# Patient Record
Sex: MALE | Race: WHITE | NOT HISPANIC OR LATINO | Employment: OTHER | URBAN - METROPOLITAN AREA
[De-identification: names, ages, dates, MRNs, and addresses within clinical notes are randomized per-mention and may not be internally consistent; named-entity substitution may affect disease eponyms.]

---

## 2017-01-19 ENCOUNTER — ALLSCRIPTS OFFICE VISIT (OUTPATIENT)
Dept: OTHER | Facility: OTHER | Age: 72
End: 2017-01-19

## 2017-03-21 ENCOUNTER — ALLSCRIPTS OFFICE VISIT (OUTPATIENT)
Dept: OTHER | Facility: OTHER | Age: 72
End: 2017-03-21

## 2017-03-21 ENCOUNTER — TRANSCRIBE ORDERS (OUTPATIENT)
Dept: LAB | Facility: HOSPITAL | Age: 72
End: 2017-03-21

## 2017-03-21 ENCOUNTER — APPOINTMENT (OUTPATIENT)
Dept: LAB | Facility: HOSPITAL | Age: 72
End: 2017-03-21
Attending: INTERNAL MEDICINE
Payer: COMMERCIAL

## 2017-03-21 ENCOUNTER — TRANSCRIBE ORDERS (OUTPATIENT)
Dept: ADMINISTRATIVE | Facility: HOSPITAL | Age: 72
End: 2017-03-21

## 2017-03-21 DIAGNOSIS — IMO0001 UNCONTROLLED TYPE 2 DIABETES MELLITUS WITHOUT COMPLICATION, WITHOUT LONG-TERM CURRENT USE OF INSULIN: ICD-10-CM

## 2017-03-21 DIAGNOSIS — E78.5 HYPERLIPIDEMIA, UNSPECIFIED HYPERLIPIDEMIA TYPE: ICD-10-CM

## 2017-03-21 DIAGNOSIS — IMO0001 UNCONTROLLED TYPE 2 DIABETES MELLITUS WITHOUT COMPLICATION, WITHOUT LONG-TERM CURRENT USE OF INSULIN: Primary | ICD-10-CM

## 2017-03-21 DIAGNOSIS — E11.65 TYPE 2 DIABETES MELLITUS WITH HYPERGLYCEMIA (HCC): ICD-10-CM

## 2017-03-21 DIAGNOSIS — J96.20 ACUTE ON CHRONIC RESPIRATORY FAILURE, UNSPECIFIED WHETHER WITH HYPOXIA OR HYPERCAPNIA (HCC): Primary | ICD-10-CM

## 2017-03-21 LAB
ALBUMIN SERPL BCP-MCNC: 3.1 G/DL (ref 3.5–5)
ALP SERPL-CCNC: 71 U/L (ref 46–116)
ALT SERPL W P-5'-P-CCNC: 43 U/L (ref 12–78)
ANION GAP SERPL CALCULATED.3IONS-SCNC: 6 MMOL/L (ref 4–13)
AST SERPL W P-5'-P-CCNC: 28 U/L (ref 5–45)
BILIRUB SERPL-MCNC: 0.33 MG/DL (ref 0.2–1)
BUN SERPL-MCNC: 17 MG/DL (ref 5–25)
CALCIUM SERPL-MCNC: 8.9 MG/DL (ref 8.3–10.1)
CHLORIDE SERPL-SCNC: 101 MMOL/L (ref 100–108)
CHOLEST SERPL-MCNC: 150 MG/DL (ref 50–200)
CO2 SERPL-SCNC: 30 MMOL/L (ref 21–32)
CREAT SERPL-MCNC: 1.17 MG/DL (ref 0.6–1.3)
ERYTHROCYTE [DISTWIDTH] IN BLOOD BY AUTOMATED COUNT: 14 % (ref 11.6–15.1)
EST. AVERAGE GLUCOSE BLD GHB EST-MCNC: 223 MG/DL
GFR SERPL CREATININE-BSD FRML MDRD: >60 ML/MIN/1.73SQ M
GLUCOSE P FAST SERPL-MCNC: 195 MG/DL (ref 65–99)
HBA1C MFR BLD: 9.4 % (ref 4.2–6.3)
HCT VFR BLD AUTO: 40.7 % (ref 36.5–49.3)
HDLC SERPL-MCNC: 53 MG/DL (ref 40–60)
HGB BLD-MCNC: 13.4 G/DL (ref 12–17)
LDLC SERPL CALC-MCNC: 67 MG/DL (ref 0–100)
MCH RBC QN AUTO: 27.9 PG (ref 26.8–34.3)
MCHC RBC AUTO-ENTMCNC: 32.9 G/DL (ref 31.4–37.4)
MCV RBC AUTO: 85 FL (ref 82–98)
PLATELET # BLD AUTO: 189 THOUSANDS/UL (ref 149–390)
PMV BLD AUTO: 10 FL (ref 8.9–12.7)
POTASSIUM SERPL-SCNC: 4.9 MMOL/L (ref 3.5–5.3)
PROT SERPL-MCNC: 7.5 G/DL (ref 6.4–8.2)
RBC # BLD AUTO: 4.8 MILLION/UL (ref 3.88–5.62)
SODIUM SERPL-SCNC: 137 MMOL/L (ref 136–145)
TRIGL SERPL-MCNC: 150 MG/DL
WBC # BLD AUTO: 12.91 THOUSAND/UL (ref 4.31–10.16)

## 2017-03-21 PROCEDURE — 83036 HEMOGLOBIN GLYCOSYLATED A1C: CPT

## 2017-03-21 PROCEDURE — 80061 LIPID PANEL: CPT

## 2017-03-21 PROCEDURE — 85027 COMPLETE CBC AUTOMATED: CPT

## 2017-03-21 PROCEDURE — 80053 COMPREHEN METABOLIC PANEL: CPT

## 2017-03-21 PROCEDURE — 36415 COLL VENOUS BLD VENIPUNCTURE: CPT

## 2017-03-31 ENCOUNTER — HOSPITAL ENCOUNTER (OUTPATIENT)
Dept: SLEEP CENTER | Facility: CLINIC | Age: 72
Discharge: HOME | End: 2017-03-31
Payer: COMMERCIAL

## 2017-03-31 DIAGNOSIS — G47.33 OSA (OBSTRUCTIVE SLEEP APNEA): ICD-10-CM

## 2017-03-31 DIAGNOSIS — J96.20 ACUTE ON CHRONIC RESPIRATORY FAILURE, UNSPECIFIED WHETHER WITH HYPOXIA OR HYPERCAPNIA (HCC): ICD-10-CM

## 2017-03-31 PROCEDURE — 95810 POLYSOM 6/> YRS 4/> PARAM: CPT

## 2017-04-10 ENCOUNTER — APPOINTMENT (EMERGENCY)
Dept: RADIOLOGY | Facility: HOSPITAL | Age: 72
End: 2017-04-10
Payer: COMMERCIAL

## 2017-04-10 ENCOUNTER — HOSPITAL ENCOUNTER (EMERGENCY)
Facility: HOSPITAL | Age: 72
Discharge: HOME | End: 2017-04-10
Attending: EMERGENCY MEDICINE
Payer: COMMERCIAL

## 2017-04-10 VITALS
HEART RATE: 94 BPM | WEIGHT: 279 LBS | SYSTOLIC BLOOD PRESSURE: 147 MMHG | OXYGEN SATURATION: 96 % | TEMPERATURE: 98.2 F | RESPIRATION RATE: 18 BRPM | DIASTOLIC BLOOD PRESSURE: 76 MMHG | BODY MASS INDEX: 37.84 KG/M2

## 2017-04-10 DIAGNOSIS — L03.90 CELLULITIS: Primary | ICD-10-CM

## 2017-04-10 LAB
ANION GAP SERPL CALCULATED.3IONS-SCNC: 5 MMOL/L (ref 4–13)
BASOPHILS # BLD AUTO: 0.02 THOUSANDS/ΜL (ref 0–0.1)
BASOPHILS NFR BLD AUTO: 0 % (ref 0–1)
BUN SERPL-MCNC: 17 MG/DL (ref 5–25)
CALCIUM SERPL-MCNC: 8.6 MG/DL (ref 8.3–10.1)
CHLORIDE SERPL-SCNC: 101 MMOL/L (ref 100–108)
CO2 SERPL-SCNC: 32 MMOL/L (ref 21–32)
CREAT SERPL-MCNC: 1.22 MG/DL (ref 0.6–1.3)
EOSINOPHIL # BLD AUTO: 0.21 THOUSAND/ΜL (ref 0–0.61)
EOSINOPHIL NFR BLD AUTO: 2 % (ref 0–6)
ERYTHROCYTE [DISTWIDTH] IN BLOOD BY AUTOMATED COUNT: 14.6 % (ref 11.6–15.1)
GFR SERPL CREATININE-BSD FRML MDRD: 58.9 ML/MIN/1.73SQ M
GLUCOSE SERPL-MCNC: 228 MG/DL (ref 65–140)
HCT VFR BLD AUTO: 38.5 % (ref 36.5–49.3)
HGB BLD-MCNC: 12.8 G/DL (ref 12–17)
HOLD SPECIMEN: NORMAL
HOLD SPECIMEN: NORMAL
LYMPHOCYTES # BLD AUTO: 1.73 THOUSANDS/ΜL (ref 0.6–4.47)
LYMPHOCYTES NFR BLD AUTO: 16 % (ref 14–44)
MCH RBC QN AUTO: 27.7 PG (ref 26.8–34.3)
MCHC RBC AUTO-ENTMCNC: 33.2 G/DL (ref 31.4–37.4)
MCV RBC AUTO: 83 FL (ref 82–98)
MONOCYTES # BLD AUTO: 0.74 THOUSAND/ΜL (ref 0.17–1.22)
MONOCYTES NFR BLD AUTO: 7 % (ref 4–12)
NEUTROPHILS # BLD AUTO: 8.04 THOUSANDS/ΜL (ref 1.85–7.62)
NEUTS SEG NFR BLD AUTO: 75 % (ref 43–75)
NRBC BLD AUTO-RTO: 0 /100 WBCS
PLATELET # BLD AUTO: 163 THOUSANDS/UL (ref 149–390)
PMV BLD AUTO: 10.1 FL (ref 8.9–12.7)
POTASSIUM SERPL-SCNC: 4.3 MMOL/L (ref 3.5–5.3)
RBC # BLD AUTO: 4.62 MILLION/UL (ref 3.88–5.62)
SODIUM SERPL-SCNC: 138 MMOL/L (ref 136–145)
WBC # BLD AUTO: 10.79 THOUSAND/UL (ref 4.31–10.16)

## 2017-04-10 PROCEDURE — 80048 BASIC METABOLIC PNL TOTAL CA: CPT | Performed by: EMERGENCY MEDICINE

## 2017-04-10 PROCEDURE — 85025 COMPLETE CBC W/AUTO DIFF WBC: CPT | Performed by: EMERGENCY MEDICINE

## 2017-04-10 PROCEDURE — 99284 EMERGENCY DEPT VISIT MOD MDM: CPT

## 2017-04-10 PROCEDURE — 73630 X-RAY EXAM OF FOOT: CPT

## 2017-04-10 PROCEDURE — 36415 COLL VENOUS BLD VENIPUNCTURE: CPT | Performed by: EMERGENCY MEDICINE

## 2017-04-10 RX ORDER — CEPHALEXIN 500 MG/1
500 CAPSULE ORAL 4 TIMES DAILY
Qty: 40 CAPSULE | Refills: 0 | Status: SHIPPED | OUTPATIENT
Start: 2017-04-10 | End: 2017-04-12 | Stop reason: HOSPADM

## 2017-04-10 RX ORDER — CEPHALEXIN 250 MG/1
500 CAPSULE ORAL ONCE
Status: COMPLETED | OUTPATIENT
Start: 2017-04-10 | End: 2017-04-10

## 2017-04-10 RX ADMIN — CEPHALEXIN 500 MG: 250 CAPSULE ORAL at 20:20

## 2017-04-11 ENCOUNTER — HOSPITAL ENCOUNTER (OUTPATIENT)
Facility: HOSPITAL | Age: 72
Setting detail: OBSERVATION
Discharge: HOME | End: 2017-04-12
Attending: EMERGENCY MEDICINE | Admitting: HOSPITALIST
Payer: COMMERCIAL

## 2017-04-11 DIAGNOSIS — R73.9 HYPERGLYCEMIA: ICD-10-CM

## 2017-04-11 DIAGNOSIS — L03.115 CELLULITIS OF FOOT, RIGHT: ICD-10-CM

## 2017-04-11 DIAGNOSIS — L03.116 CELLULITIS OF FOOT, LEFT: Primary | ICD-10-CM

## 2017-04-11 LAB
ANION GAP SERPL CALCULATED.3IONS-SCNC: 8 MMOL/L (ref 4–13)
BASOPHILS # BLD AUTO: 0.02 THOUSANDS/ΜL (ref 0–0.1)
BASOPHILS NFR BLD AUTO: 0 % (ref 0–1)
BUN SERPL-MCNC: 16 MG/DL (ref 5–25)
CALCIUM SERPL-MCNC: 8.3 MG/DL (ref 8.3–10.1)
CHLORIDE SERPL-SCNC: 101 MMOL/L (ref 100–108)
CO2 SERPL-SCNC: 29 MMOL/L (ref 21–32)
CREAT SERPL-MCNC: 1.15 MG/DL (ref 0.6–1.3)
EOSINOPHIL # BLD AUTO: 0.21 THOUSAND/ΜL (ref 0–0.61)
EOSINOPHIL NFR BLD AUTO: 2 % (ref 0–6)
ERYTHROCYTE [DISTWIDTH] IN BLOOD BY AUTOMATED COUNT: 14.4 % (ref 11.6–15.1)
GFR SERPL CREATININE-BSD FRML MDRD: >60 ML/MIN/1.73SQ M
GLUCOSE SERPL-MCNC: 203 MG/DL (ref 65–140)
GLUCOSE SERPL-MCNC: 215 MG/DL (ref 65–140)
GLUCOSE SERPL-MCNC: 235 MG/DL (ref 65–140)
HCT VFR BLD AUTO: 38.5 % (ref 36.5–49.3)
HGB BLD-MCNC: 12.9 G/DL (ref 12–17)
LYMPHOCYTES # BLD AUTO: 1.62 THOUSANDS/ΜL (ref 0.6–4.47)
LYMPHOCYTES NFR BLD AUTO: 17 % (ref 14–44)
MCH RBC QN AUTO: 27.9 PG (ref 26.8–34.3)
MCHC RBC AUTO-ENTMCNC: 33.5 G/DL (ref 31.4–37.4)
MCV RBC AUTO: 83 FL (ref 82–98)
MONOCYTES # BLD AUTO: 0.56 THOUSAND/ΜL (ref 0.17–1.22)
MONOCYTES NFR BLD AUTO: 6 % (ref 4–12)
NEUTROPHILS # BLD AUTO: 6.95 THOUSANDS/ΜL (ref 1.85–7.62)
NEUTS SEG NFR BLD AUTO: 75 % (ref 43–75)
NRBC BLD AUTO-RTO: 0 /100 WBCS
PLATELET # BLD AUTO: 162 THOUSANDS/UL (ref 149–390)
PMV BLD AUTO: 9.8 FL (ref 8.9–12.7)
POTASSIUM SERPL-SCNC: 4.1 MMOL/L (ref 3.5–5.3)
RBC # BLD AUTO: 4.62 MILLION/UL (ref 3.88–5.62)
SODIUM SERPL-SCNC: 138 MMOL/L (ref 136–145)
WBC # BLD AUTO: 9.4 THOUSAND/UL (ref 4.31–10.16)

## 2017-04-11 PROCEDURE — 82948 REAGENT STRIP/BLOOD GLUCOSE: CPT

## 2017-04-11 PROCEDURE — 94760 N-INVAS EAR/PLS OXIMETRY 1: CPT

## 2017-04-11 PROCEDURE — 99284 EMERGENCY DEPT VISIT MOD MDM: CPT

## 2017-04-11 PROCEDURE — 94640 AIRWAY INHALATION TREATMENT: CPT

## 2017-04-11 PROCEDURE — 36415 COLL VENOUS BLD VENIPUNCTURE: CPT | Performed by: EMERGENCY MEDICINE

## 2017-04-11 PROCEDURE — 85025 COMPLETE CBC W/AUTO DIFF WBC: CPT | Performed by: EMERGENCY MEDICINE

## 2017-04-11 PROCEDURE — 80048 BASIC METABOLIC PNL TOTAL CA: CPT | Performed by: EMERGENCY MEDICINE

## 2017-04-11 PROCEDURE — 96365 THER/PROPH/DIAG IV INF INIT: CPT

## 2017-04-11 RX ORDER — PRAVASTATIN SODIUM 80 MG/1
80 TABLET ORAL
Status: DISCONTINUED | OUTPATIENT
Start: 2017-04-11 | End: 2017-04-12 | Stop reason: HOSPADM

## 2017-04-11 RX ORDER — CYCLOBENZAPRINE HCL 10 MG
10 TABLET ORAL 2 TIMES DAILY
Status: DISCONTINUED | OUTPATIENT
Start: 2017-04-11 | End: 2017-04-12 | Stop reason: HOSPADM

## 2017-04-11 RX ORDER — CYCLOBENZAPRINE HCL 10 MG
10 TABLET ORAL DAILY
Status: DISCONTINUED | OUTPATIENT
Start: 2017-04-11 | End: 2017-04-11

## 2017-04-11 RX ORDER — ALBUTEROL SULFATE 2.5 MG/3ML
2.5 SOLUTION RESPIRATORY (INHALATION) EVERY MORNING
Status: DISCONTINUED | OUTPATIENT
Start: 2017-04-12 | End: 2017-04-12 | Stop reason: HOSPADM

## 2017-04-11 RX ORDER — BUDESONIDE 0.5 MG/2ML
0.5 INHALANT ORAL
Status: DISCONTINUED | OUTPATIENT
Start: 2017-04-11 | End: 2017-04-12 | Stop reason: HOSPADM

## 2017-04-11 RX ORDER — OXYCODONE HYDROCHLORIDE AND ACETAMINOPHEN 5; 325 MG/1; MG/1
1 TABLET ORAL ONCE
Status: COMPLETED | OUTPATIENT
Start: 2017-04-11 | End: 2017-04-11

## 2017-04-11 RX ORDER — ALBUTEROL SULFATE 2.5 MG/3ML
2.5 SOLUTION RESPIRATORY (INHALATION) EVERY 4 HOURS PRN
Status: DISCONTINUED | OUTPATIENT
Start: 2017-04-11 | End: 2017-04-12 | Stop reason: HOSPADM

## 2017-04-11 RX ORDER — OXYCODONE HYDROCHLORIDE AND ACETAMINOPHEN 5; 325 MG/1; MG/1
1.5 TABLET ORAL 2 TIMES DAILY PRN
Status: DISCONTINUED | OUTPATIENT
Start: 2017-04-11 | End: 2017-04-12 | Stop reason: HOSPADM

## 2017-04-11 RX ORDER — LISINOPRIL 20 MG/1
40 TABLET ORAL DAILY
Status: DISCONTINUED | OUTPATIENT
Start: 2017-04-11 | End: 2017-04-12 | Stop reason: HOSPADM

## 2017-04-11 RX ORDER — CYCLOBENZAPRINE HCL 10 MG
10 TABLET ORAL 2 TIMES DAILY
Status: DISCONTINUED | OUTPATIENT
Start: 2017-04-12 | End: 2017-04-11

## 2017-04-11 RX ORDER — CLINDAMYCIN PHOSPHATE 600 MG/50ML
600 INJECTION INTRAVENOUS ONCE
Status: COMPLETED | OUTPATIENT
Start: 2017-04-11 | End: 2017-04-11

## 2017-04-11 RX ORDER — ALBUTEROL SULFATE 2.5 MG/3ML
2.5 SOLUTION RESPIRATORY (INHALATION) EVERY 6 HOURS PRN
Status: DISCONTINUED | OUTPATIENT
Start: 2017-04-11 | End: 2017-04-11

## 2017-04-11 RX ADMIN — INSULIN HUMAN 40 UNITS: 500 INJECTION, SOLUTION SUBCUTANEOUS at 18:41

## 2017-04-11 RX ADMIN — PRAVASTATIN SODIUM 80 MG: 80 TABLET ORAL at 22:07

## 2017-04-11 RX ADMIN — CYCLOBENZAPRINE HYDROCHLORIDE 10 MG: 10 TABLET, FILM COATED ORAL at 22:08

## 2017-04-11 RX ADMIN — OXYCODONE HYDROCHLORIDE AND ACETAMINOPHEN 1.5 TABLET: 5; 325 TABLET ORAL at 22:19

## 2017-04-11 RX ADMIN — METOPROLOL TARTRATE 25 MG: 25 TABLET ORAL at 22:06

## 2017-04-11 RX ADMIN — BUDESONIDE 0.5 MG: 0.5 INHALANT RESPIRATORY (INHALATION) at 18:50

## 2017-04-11 RX ADMIN — ENOXAPARIN SODIUM 40 MG: 40 INJECTION SUBCUTANEOUS at 15:35

## 2017-04-11 RX ADMIN — OXYCODONE HYDROCHLORIDE AND ACETAMINOPHEN 1 TABLET: 5; 325 TABLET ORAL at 09:58

## 2017-04-11 RX ADMIN — INSULIN HUMAN 50 UNITS: 500 INJECTION, SOLUTION SUBCUTANEOUS at 13:35

## 2017-04-11 RX ADMIN — CLINDAMYCIN PHOSPHATE 600 MG: 12 INJECTION, SOLUTION INTRAMUSCULAR; INTRAVENOUS at 09:14

## 2017-04-12 VITALS
HEIGHT: 72 IN | RESPIRATION RATE: 18 BRPM | DIASTOLIC BLOOD PRESSURE: 79 MMHG | BODY MASS INDEX: 37.65 KG/M2 | HEART RATE: 80 BPM | SYSTOLIC BLOOD PRESSURE: 145 MMHG | WEIGHT: 278 LBS | TEMPERATURE: 97.6 F | OXYGEN SATURATION: 97 %

## 2017-04-12 LAB
GLUCOSE SERPL-MCNC: 235 MG/DL (ref 65–140)
GLUCOSE SERPL-MCNC: 246 MG/DL (ref 65–140)

## 2017-04-12 PROCEDURE — 82948 REAGENT STRIP/BLOOD GLUCOSE: CPT

## 2017-04-12 PROCEDURE — 94760 N-INVAS EAR/PLS OXIMETRY 1: CPT

## 2017-04-12 PROCEDURE — 94640 AIRWAY INHALATION TREATMENT: CPT

## 2017-04-12 RX ADMIN — ENOXAPARIN SODIUM 40 MG: 40 INJECTION SUBCUTANEOUS at 08:20

## 2017-04-12 RX ADMIN — CYCLOBENZAPRINE HYDROCHLORIDE 10 MG: 10 TABLET, FILM COATED ORAL at 08:20

## 2017-04-12 RX ADMIN — BUDESONIDE 0.5 MG: 0.5 INHALANT RESPIRATORY (INHALATION) at 07:58

## 2017-04-12 RX ADMIN — METOPROLOL TARTRATE 25 MG: 25 TABLET ORAL at 08:20

## 2017-04-12 RX ADMIN — LISINOPRIL 40 MG: 20 TABLET ORAL at 08:20

## 2017-04-12 RX ADMIN — INSULIN HUMAN 50 UNITS: 500 INJECTION, SOLUTION SUBCUTANEOUS at 08:26

## 2017-04-12 RX ADMIN — OXYCODONE HYDROCHLORIDE AND ACETAMINOPHEN 1.5 TABLET: 5; 325 TABLET ORAL at 08:21

## 2017-04-12 RX ADMIN — INSULIN HUMAN 50 UNITS: 500 INJECTION, SOLUTION SUBCUTANEOUS at 11:55

## 2017-04-12 RX ADMIN — ALBUTEROL SULFATE 2.5 MG: 2.5 SOLUTION RESPIRATORY (INHALATION) at 07:58

## 2017-04-25 ENCOUNTER — ALLSCRIPTS OFFICE VISIT (OUTPATIENT)
Dept: OTHER | Facility: OTHER | Age: 72
End: 2017-04-25

## 2017-07-16 ENCOUNTER — HOSPITAL ENCOUNTER (INPATIENT)
Facility: HOSPITAL | Age: 72
LOS: 4 days | Discharge: HOME | DRG: 191 | End: 2017-07-21
Attending: EMERGENCY MEDICINE | Admitting: HOSPITALIST
Payer: COMMERCIAL

## 2017-07-16 ENCOUNTER — APPOINTMENT (EMERGENCY)
Dept: RADIOLOGY | Facility: HOSPITAL | Age: 72
DRG: 191 | End: 2017-07-16
Payer: COMMERCIAL

## 2017-07-16 DIAGNOSIS — D72.829 LEUKOCYTOSIS: ICD-10-CM

## 2017-07-16 DIAGNOSIS — J44.1 COPD EXACERBATION (HCC): Primary | ICD-10-CM

## 2017-07-16 DIAGNOSIS — J96.22 ACUTE ON CHRONIC RESPIRATORY FAILURE WITH HYPERCAPNIA (HCC): ICD-10-CM

## 2017-07-16 DIAGNOSIS — E11.65 TYPE 2 DIABETES MELLITUS WITH HYPERGLYCEMIA, WITH LONG-TERM CURRENT USE OF INSULIN (HCC): ICD-10-CM

## 2017-07-16 DIAGNOSIS — Z79.4 TYPE 2 DIABETES MELLITUS WITH HYPERGLYCEMIA, WITH LONG-TERM CURRENT USE OF INSULIN (HCC): ICD-10-CM

## 2017-07-16 LAB
ALBUMIN SERPL BCP-MCNC: 3 G/DL (ref 3.5–5)
ALP SERPL-CCNC: 81 U/L (ref 46–116)
ALT SERPL W P-5'-P-CCNC: 71 U/L (ref 12–78)
ANION GAP SERPL CALCULATED.3IONS-SCNC: 5 MMOL/L (ref 4–13)
AST SERPL W P-5'-P-CCNC: 67 U/L (ref 5–45)
BASOPHILS # BLD AUTO: 0.02 THOUSANDS/ΜL (ref 0–0.1)
BASOPHILS NFR BLD AUTO: 0 % (ref 0–1)
BILIRUB SERPL-MCNC: 0.24 MG/DL (ref 0.2–1)
BUN SERPL-MCNC: 18 MG/DL (ref 5–25)
CALCIUM SERPL-MCNC: 8.6 MG/DL (ref 8.3–10.1)
CHLORIDE SERPL-SCNC: 99 MMOL/L (ref 100–108)
CO2 SERPL-SCNC: 34 MMOL/L (ref 21–32)
CREAT SERPL-MCNC: 1.24 MG/DL (ref 0.6–1.3)
EOSINOPHIL # BLD AUTO: 0.17 THOUSAND/ΜL (ref 0–0.61)
EOSINOPHIL NFR BLD AUTO: 1 % (ref 0–6)
ERYTHROCYTE [DISTWIDTH] IN BLOOD BY AUTOMATED COUNT: 15.1 % (ref 11.6–15.1)
GFR SERPL CREATININE-BSD FRML MDRD: 57.8 ML/MIN/1.73SQ M
GLUCOSE SERPL-MCNC: 257 MG/DL (ref 65–140)
HCT VFR BLD AUTO: 41.1 % (ref 36.5–49.3)
HGB BLD-MCNC: 13.4 G/DL (ref 12–17)
HOLD SPECIMEN: NORMAL
LYMPHOCYTES # BLD AUTO: 1.51 THOUSANDS/ΜL (ref 0.6–4.47)
LYMPHOCYTES NFR BLD AUTO: 11 % (ref 14–44)
MCH RBC QN AUTO: 27.6 PG (ref 26.8–34.3)
MCHC RBC AUTO-ENTMCNC: 32.6 G/DL (ref 31.4–37.4)
MCV RBC AUTO: 85 FL (ref 82–98)
MONOCYTES # BLD AUTO: 0.91 THOUSAND/ΜL (ref 0.17–1.22)
MONOCYTES NFR BLD AUTO: 6 % (ref 4–12)
NEUTROPHILS # BLD AUTO: 11.67 THOUSANDS/ΜL (ref 1.85–7.62)
NEUTS SEG NFR BLD AUTO: 82 % (ref 43–75)
NRBC BLD AUTO-RTO: 0 /100 WBCS
PLATELET # BLD AUTO: 168 THOUSANDS/UL (ref 149–390)
PMV BLD AUTO: 9.9 FL (ref 8.9–12.7)
POTASSIUM SERPL-SCNC: 4.3 MMOL/L (ref 3.5–5.3)
PROT SERPL-MCNC: 7.5 G/DL (ref 6.4–8.2)
RBC # BLD AUTO: 4.86 MILLION/UL (ref 3.88–5.62)
SODIUM SERPL-SCNC: 138 MMOL/L (ref 136–145)
SPECIMEN SOURCE: NORMAL
TROPONIN I BLD-MCNC: 0 NG/ML (ref 0–0.08)
WBC # BLD AUTO: 14.39 THOUSAND/UL (ref 4.31–10.16)

## 2017-07-16 PROCEDURE — 80053 COMPREHEN METABOLIC PANEL: CPT | Performed by: EMERGENCY MEDICINE

## 2017-07-16 PROCEDURE — 71010 HB CHEST X-RAY 1 VIEW FRONTAL (PORTABLE): CPT

## 2017-07-16 PROCEDURE — 36415 COLL VENOUS BLD VENIPUNCTURE: CPT

## 2017-07-16 PROCEDURE — 85025 COMPLETE CBC W/AUTO DIFF WBC: CPT | Performed by: EMERGENCY MEDICINE

## 2017-07-16 PROCEDURE — 93005 ELECTROCARDIOGRAM TRACING: CPT | Performed by: EMERGENCY MEDICINE

## 2017-07-16 PROCEDURE — 84484 ASSAY OF TROPONIN QUANT: CPT

## 2017-07-16 RX ORDER — ALBUTEROL SULFATE 2.5 MG/3ML
10 SOLUTION RESPIRATORY (INHALATION) ONCE
Status: COMPLETED | OUTPATIENT
Start: 2017-07-16 | End: 2017-07-16

## 2017-07-16 RX ADMIN — IPRATROPIUM BROMIDE 1 MG: 0.5 SOLUTION RESPIRATORY (INHALATION) at 22:23

## 2017-07-16 RX ADMIN — ALBUTEROL SULFATE 10 MG: 2.5 SOLUTION RESPIRATORY (INHALATION) at 22:23

## 2017-07-17 PROBLEM — D72.829 LEUKOCYTOSIS: Status: ACTIVE | Noted: 2017-07-17

## 2017-07-17 LAB
ANION GAP SERPL CALCULATED.3IONS-SCNC: 10 MMOL/L (ref 4–13)
ATRIAL RATE: 110 BPM
BUN SERPL-MCNC: 22 MG/DL (ref 5–25)
CALCIUM SERPL-MCNC: 8.2 MG/DL (ref 8.3–10.1)
CHLORIDE SERPL-SCNC: 94 MMOL/L (ref 100–108)
CO2 SERPL-SCNC: 29 MMOL/L (ref 21–32)
CREAT SERPL-MCNC: 1.71 MG/DL (ref 0.6–1.3)
ERYTHROCYTE [DISTWIDTH] IN BLOOD BY AUTOMATED COUNT: 15.1 % (ref 11.6–15.1)
EST. AVERAGE GLUCOSE BLD GHB EST-MCNC: 206 MG/DL
GFR SERPL CREATININE-BSD FRML MDRD: 39.9 ML/MIN/1.73SQ M
GLUCOSE SERPL-MCNC: 295 MG/DL (ref 65–140)
GLUCOSE SERPL-MCNC: 376 MG/DL (ref 65–140)
GLUCOSE SERPL-MCNC: 417 MG/DL (ref 65–140)
GLUCOSE SERPL-MCNC: 436 MG/DL (ref 65–140)
GLUCOSE SERPL-MCNC: 441 MG/DL (ref 65–140)
GLUCOSE SERPL-MCNC: 457 MG/DL (ref 65–140)
GLUCOSE SERPL-MCNC: 461 MG/DL (ref 65–140)
HBA1C MFR BLD: 8.8 % (ref 4.2–6.3)
HCT VFR BLD AUTO: 39.3 % (ref 36.5–49.3)
HGB BLD-MCNC: 12.4 G/DL (ref 12–17)
MAGNESIUM SERPL-MCNC: 2.4 MG/DL (ref 1.6–2.6)
MCH RBC QN AUTO: 27.3 PG (ref 26.8–34.3)
MCHC RBC AUTO-ENTMCNC: 31.6 G/DL (ref 31.4–37.4)
MCV RBC AUTO: 87 FL (ref 82–98)
P AXIS: 98 DEGREES
PHOSPHATE SERPL-MCNC: 3.5 MG/DL (ref 2.3–4.1)
PLATELET # BLD AUTO: 161 THOUSANDS/UL (ref 149–390)
PMV BLD AUTO: 10.4 FL (ref 8.9–12.7)
POTASSIUM SERPL-SCNC: 4.4 MMOL/L (ref 3.5–5.3)
PR INTERVAL: 152 MS
QRS AXIS: 108 DEGREES
QRSD INTERVAL: 118 MS
QT INTERVAL: 370 MS
QTC INTERVAL: 500 MS
RBC # BLD AUTO: 4.54 MILLION/UL (ref 3.88–5.62)
SODIUM SERPL-SCNC: 133 MMOL/L (ref 136–145)
T WAVE AXIS: 65 DEGREES
VENTRICULAR RATE: 110 BPM
WBC # BLD AUTO: 15 THOUSAND/UL (ref 4.31–10.16)

## 2017-07-17 PROCEDURE — 36415 COLL VENOUS BLD VENIPUNCTURE: CPT | Performed by: HOSPITALIST

## 2017-07-17 PROCEDURE — 83735 ASSAY OF MAGNESIUM: CPT | Performed by: HOSPITALIST

## 2017-07-17 PROCEDURE — 82948 REAGENT STRIP/BLOOD GLUCOSE: CPT

## 2017-07-17 PROCEDURE — 94760 N-INVAS EAR/PLS OXIMETRY 1: CPT

## 2017-07-17 PROCEDURE — 96375 TX/PRO/DX INJ NEW DRUG ADDON: CPT

## 2017-07-17 PROCEDURE — 94640 AIRWAY INHALATION TREATMENT: CPT

## 2017-07-17 PROCEDURE — 83036 HEMOGLOBIN GLYCOSYLATED A1C: CPT | Performed by: HOSPITALIST

## 2017-07-17 PROCEDURE — 84100 ASSAY OF PHOSPHORUS: CPT | Performed by: HOSPITALIST

## 2017-07-17 PROCEDURE — 96365 THER/PROPH/DIAG IV INF INIT: CPT

## 2017-07-17 PROCEDURE — 85027 COMPLETE CBC AUTOMATED: CPT | Performed by: HOSPITALIST

## 2017-07-17 PROCEDURE — 99285 EMERGENCY DEPT VISIT HI MDM: CPT

## 2017-07-17 PROCEDURE — 94644 CONT INHLJ TX 1ST HOUR: CPT

## 2017-07-17 PROCEDURE — 80048 BASIC METABOLIC PNL TOTAL CA: CPT | Performed by: HOSPITALIST

## 2017-07-17 RX ORDER — METHYLPREDNISOLONE SODIUM SUCCINATE 125 MG/2ML
125 INJECTION, POWDER, LYOPHILIZED, FOR SOLUTION INTRAMUSCULAR; INTRAVENOUS ONCE
Status: COMPLETED | OUTPATIENT
Start: 2017-07-17 | End: 2017-07-17

## 2017-07-17 RX ORDER — GUAIFENESIN 600 MG
600 TABLET, EXTENDED RELEASE 12 HR ORAL EVERY 12 HOURS SCHEDULED
Status: DISCONTINUED | OUTPATIENT
Start: 2017-07-17 | End: 2017-07-21 | Stop reason: HOSPADM

## 2017-07-17 RX ORDER — CYCLOBENZAPRINE HCL 10 MG
10 TABLET ORAL ONCE
Status: COMPLETED | OUTPATIENT
Start: 2017-07-17 | End: 2017-07-17

## 2017-07-17 RX ORDER — LISINOPRIL 20 MG/1
40 TABLET ORAL DAILY
Status: DISCONTINUED | OUTPATIENT
Start: 2017-07-17 | End: 2017-07-18

## 2017-07-17 RX ORDER — LEVALBUTEROL 1.25 MG/.5ML
1.25 SOLUTION, CONCENTRATE RESPIRATORY (INHALATION) EVERY 6 HOURS PRN
Status: DISCONTINUED | OUTPATIENT
Start: 2017-07-17 | End: 2017-07-21 | Stop reason: HOSPADM

## 2017-07-17 RX ORDER — SODIUM CHLORIDE 9 MG/ML
75 INJECTION, SOLUTION INTRAVENOUS CONTINUOUS
Status: DISPENSED | OUTPATIENT
Start: 2017-07-17 | End: 2017-07-17

## 2017-07-17 RX ORDER — LEVALBUTEROL 1.25 MG/.5ML
1.25 SOLUTION, CONCENTRATE RESPIRATORY (INHALATION)
Status: DISCONTINUED | OUTPATIENT
Start: 2017-07-17 | End: 2017-07-21 | Stop reason: HOSPADM

## 2017-07-17 RX ORDER — CYCLOBENZAPRINE HCL 10 MG
10 TABLET ORAL DAILY
Status: DISCONTINUED | OUTPATIENT
Start: 2017-07-17 | End: 2017-07-19

## 2017-07-17 RX ORDER — OXYCODONE HYDROCHLORIDE AND ACETAMINOPHEN 5; 325 MG/1; MG/1
1 TABLET ORAL ONCE
Status: COMPLETED | OUTPATIENT
Start: 2017-07-17 | End: 2017-07-17

## 2017-07-17 RX ORDER — POLYETHYLENE GLYCOL 3350 17 G/17G
17 POWDER, FOR SOLUTION ORAL DAILY PRN
Status: DISCONTINUED | OUTPATIENT
Start: 2017-07-17 | End: 2017-07-21 | Stop reason: HOSPADM

## 2017-07-17 RX ORDER — ALBUTEROL SULFATE 2.5 MG/3ML
10 SOLUTION RESPIRATORY (INHALATION) ONCE
Status: COMPLETED | OUTPATIENT
Start: 2017-07-17 | End: 2017-07-17

## 2017-07-17 RX ORDER — METHYLPREDNISOLONE SODIUM SUCCINATE 40 MG/ML
40 INJECTION, POWDER, LYOPHILIZED, FOR SOLUTION INTRAMUSCULAR; INTRAVENOUS EVERY 8 HOURS
Status: DISCONTINUED | OUTPATIENT
Start: 2017-07-17 | End: 2017-07-19

## 2017-07-17 RX ORDER — MAGNESIUM SULFATE HEPTAHYDRATE 40 MG/ML
2 INJECTION, SOLUTION INTRAVENOUS ONCE
Status: COMPLETED | OUTPATIENT
Start: 2017-07-17 | End: 2017-07-17

## 2017-07-17 RX ORDER — SODIUM CHLORIDE FOR INHALATION 0.9 %
3 VIAL, NEBULIZER (ML) INHALATION
Status: DISCONTINUED | OUTPATIENT
Start: 2017-07-17 | End: 2017-07-17

## 2017-07-17 RX ORDER — LEVOFLOXACIN 500 MG/1
500 TABLET, FILM COATED ORAL DAILY
Status: DISCONTINUED | OUTPATIENT
Start: 2017-07-17 | End: 2017-07-21 | Stop reason: HOSPADM

## 2017-07-17 RX ORDER — ALBUTEROL SULFATE 2.5 MG/3ML
2.5 SOLUTION RESPIRATORY (INHALATION)
Status: DISCONTINUED | OUTPATIENT
Start: 2017-07-17 | End: 2017-07-17

## 2017-07-17 RX ORDER — ONDANSETRON 2 MG/ML
4 INJECTION INTRAMUSCULAR; INTRAVENOUS EVERY 6 HOURS PRN
Status: DISCONTINUED | OUTPATIENT
Start: 2017-07-17 | End: 2017-07-21 | Stop reason: HOSPADM

## 2017-07-17 RX ORDER — SENNOSIDES 8.6 MG
1 TABLET ORAL DAILY
Status: DISCONTINUED | OUTPATIENT
Start: 2017-07-17 | End: 2017-07-21 | Stop reason: HOSPADM

## 2017-07-17 RX ORDER — ACETAMINOPHEN 325 MG/1
650 TABLET ORAL EVERY 6 HOURS PRN
Status: DISCONTINUED | OUTPATIENT
Start: 2017-07-17 | End: 2017-07-21 | Stop reason: HOSPADM

## 2017-07-17 RX ORDER — CHLORAL HYDRATE 500 MG
1000 CAPSULE ORAL DAILY
Status: DISCONTINUED | OUTPATIENT
Start: 2017-07-17 | End: 2017-07-21 | Stop reason: HOSPADM

## 2017-07-17 RX ORDER — HYDRALAZINE HYDROCHLORIDE 20 MG/ML
5 INJECTION INTRAMUSCULAR; INTRAVENOUS EVERY 6 HOURS PRN
Status: DISCONTINUED | OUTPATIENT
Start: 2017-07-17 | End: 2017-07-21 | Stop reason: HOSPADM

## 2017-07-17 RX ORDER — OXYCODONE HYDROCHLORIDE AND ACETAMINOPHEN 5; 325 MG/1; MG/1
1 TABLET ORAL EVERY 6 HOURS PRN
Status: DISCONTINUED | OUTPATIENT
Start: 2017-07-17 | End: 2017-07-21 | Stop reason: HOSPADM

## 2017-07-17 RX ORDER — PRAVASTATIN SODIUM 80 MG/1
80 TABLET ORAL
Status: DISCONTINUED | OUTPATIENT
Start: 2017-07-17 | End: 2017-07-21 | Stop reason: HOSPADM

## 2017-07-17 RX ORDER — DOCUSATE SODIUM 100 MG/1
100 CAPSULE, LIQUID FILLED ORAL 2 TIMES DAILY
Status: DISCONTINUED | OUTPATIENT
Start: 2017-07-17 | End: 2017-07-21 | Stop reason: HOSPADM

## 2017-07-17 RX ADMIN — INSULIN LISPRO 12 UNITS: 100 INJECTION, SOLUTION INTRAVENOUS; SUBCUTANEOUS at 06:39

## 2017-07-17 RX ADMIN — IPRATROPIUM BROMIDE 0.5 MG: 0.5 SOLUTION RESPIRATORY (INHALATION) at 13:03

## 2017-07-17 RX ADMIN — ALBUTEROL SULFATE 2.5 MG: 2.5 SOLUTION RESPIRATORY (INHALATION) at 03:55

## 2017-07-17 RX ADMIN — LEVOFLOXACIN 500 MG: 500 TABLET, FILM COATED ORAL at 10:21

## 2017-07-17 RX ADMIN — LEVALBUTEROL HYDROCHLORIDE 1.25 MG: 1.25 SOLUTION, CONCENTRATE RESPIRATORY (INHALATION) at 13:03

## 2017-07-17 RX ADMIN — GUAIFENESIN 600 MG: 600 TABLET, EXTENDED RELEASE ORAL at 20:21

## 2017-07-17 RX ADMIN — CYCLOBENZAPRINE HYDROCHLORIDE 10 MG: 10 TABLET, FILM COATED ORAL at 03:54

## 2017-07-17 RX ADMIN — SODIUM CHLORIDE 10 UNITS/HR: 9 INJECTION, SOLUTION INTRAVENOUS at 15:48

## 2017-07-17 RX ADMIN — METHYLPREDNISOLONE SODIUM SUCCINATE 40 MG: 40 INJECTION, POWDER, FOR SOLUTION INTRAMUSCULAR; INTRAVENOUS at 15:50

## 2017-07-17 RX ADMIN — METHYLPREDNISOLONE SODIUM SUCCINATE 125 MG: 125 INJECTION, POWDER, FOR SOLUTION INTRAMUSCULAR; INTRAVENOUS at 00:39

## 2017-07-17 RX ADMIN — LISINOPRIL 40 MG: 20 TABLET ORAL at 09:04

## 2017-07-17 RX ADMIN — IPRATROPIUM BROMIDE 0.5 MG: 0.5 SOLUTION RESPIRATORY (INHALATION) at 20:00

## 2017-07-17 RX ADMIN — ALBUTEROL SULFATE 10 MG: 2.5 SOLUTION RESPIRATORY (INHALATION) at 00:47

## 2017-07-17 RX ADMIN — SENNOSIDES 8.6 MG: 8.6 TABLET, FILM COATED ORAL at 09:05

## 2017-07-17 RX ADMIN — OXYCODONE HYDROCHLORIDE AND ACETAMINOPHEN 1 TABLET: 5; 325 TABLET ORAL at 21:54

## 2017-07-17 RX ADMIN — DOCUSATE SODIUM 100 MG: 100 CAPSULE, LIQUID FILLED ORAL at 18:04

## 2017-07-17 RX ADMIN — Medication 1000 MG: at 09:04

## 2017-07-17 RX ADMIN — MAGNESIUM SULFATE HEPTAHYDRATE 2 G: 40 INJECTION, SOLUTION INTRAVENOUS at 00:41

## 2017-07-17 RX ADMIN — CYCLOBENZAPRINE HYDROCHLORIDE 10 MG: 10 TABLET, FILM COATED ORAL at 09:04

## 2017-07-17 RX ADMIN — OXYCODONE HYDROCHLORIDE AND ACETAMINOPHEN 1 TABLET: 5; 325 TABLET ORAL at 03:54

## 2017-07-17 RX ADMIN — CYCLOBENZAPRINE HYDROCHLORIDE 10 MG: 10 TABLET, FILM COATED ORAL at 21:54

## 2017-07-17 RX ADMIN — INSULIN LISPRO 5 UNITS: 100 INJECTION, SOLUTION INTRAVENOUS; SUBCUTANEOUS at 06:39

## 2017-07-17 RX ADMIN — METHYLPREDNISOLONE SODIUM SUCCINATE 40 MG: 40 INJECTION, POWDER, FOR SOLUTION INTRAMUSCULAR; INTRAVENOUS at 09:05

## 2017-07-17 RX ADMIN — ISODIUM CHLORIDE 3 ML: 0.03 SOLUTION RESPIRATORY (INHALATION) at 03:55

## 2017-07-17 RX ADMIN — PRAVASTATIN SODIUM 80 MG: 80 TABLET ORAL at 15:50

## 2017-07-17 RX ADMIN — SODIUM CHLORIDE 75 ML/HR: 0.9 INJECTION, SOLUTION INTRAVENOUS at 04:02

## 2017-07-17 RX ADMIN — IPRATROPIUM BROMIDE 0.5 MG: 0.5 SOLUTION RESPIRATORY (INHALATION) at 00:47

## 2017-07-17 RX ADMIN — GUAIFENESIN 600 MG: 600 TABLET, EXTENDED RELEASE ORAL at 09:04

## 2017-07-17 RX ADMIN — LEVALBUTEROL HYDROCHLORIDE 1.25 MG: 1.25 SOLUTION, CONCENTRATE RESPIRATORY (INHALATION) at 20:00

## 2017-07-17 RX ADMIN — DOCUSATE SODIUM 100 MG: 100 CAPSULE, LIQUID FILLED ORAL at 09:05

## 2017-07-17 RX ADMIN — ENOXAPARIN SODIUM 40 MG: 40 INJECTION SUBCUTANEOUS at 09:05

## 2017-07-17 RX ADMIN — INSULIN LISPRO 12 UNITS: 100 INJECTION, SOLUTION INTRAVENOUS; SUBCUTANEOUS at 11:24

## 2017-07-17 RX ADMIN — METOPROLOL TARTRATE 25 MG: 25 TABLET ORAL at 09:04

## 2017-07-17 NOTE — ED NOTES
Attempted to call [facility] to notify them of pt moving to the floor  No answer at this time        Calvert Gottron, BEATRICE  07/17/17 1413

## 2017-07-17 NOTE — H&P
History and Physical - [facility] Internal Medicine    Patient Information: Mairama Vega  73 y o  male MRN: [id number]  Unit/Bed#: ED 06 Encounter: [id number]  Admitting Physician: Ailin Jorge MD  PCP: Tori Marina MD  Date of Admission:  07/17/17    Assessment/Plan:    Hospital Problem List:     Principal Problem:    COPD exacerbation  Active Problems:    Coronary artery disease involving native coronary artery without angina pectoris    Obesity (BMI 30-39  9)    Dyslipidemia    Diabetic polyneuropathy associated with type 2 diabetes mellitus    Venous stasis dermatitis of both lower extremities    Opioid dependence    Leukocytosis      Plan for the Primary Problem(s):  · Dyspnea secondary to COPD exacerbation  · Pulmonary evaluation  · Continue supplemental oxygen  · Continue IV steroids, nebs, respiratory protocol  Eventually will need LABA/ICS  · Echocardiogram  · Leukocytosis most likely secondary to recent steroid use patient denies productive cough fever chills  Started on levofloxacin p o  for COPD exacerbation    Plan for Additional Problems:   · Obesity  Low-calorie diets  · Diabetes  Continue insulin sliding scale , would adjust given the patient on steroids which might impact his glucose level  · Hypertension  Hydralazine p r n  ordered   · Hyperlipidemia  Continue statin    VTE Prophylaxis: Enoxaparin (Lovenox)  / sequential compression device   Code Status: full  POLST: There is no POLST form on file for this patient (pre-hospital)    Anticipated Length of Stay:  Patient will be admitted on an Inpatient basis with an anticipated length of stay of  >2 midnights  Justification for Hospital Stay: IV steroid pulmonary evaluation    Total Time for Visit, including Counseling / Coordination of Care: 45 minutes  Greater than 50% of this total time spent on direct patient counseling and coordination of care      Chief Complaint:   Dyspnea, wheezes, non productive cough    History of Present Illness:    Jeronimo Sloan Sr  is a 73 y o  male with history of obesity, COPD on 2 oxygen p r n  who presented to the emergency room for evaluation of getting worse dyspnea , wheezes and  nonproductive cough   According to the patient approximately 3 weeks ago he was seen by pulmonologist for the same complaints and started on steroid taper   He complained a course of steroid taper one week ago with no significant relief of symptoms   Patient also cannot afford rescue nebz  denies getting worse quantity or quality of his sputum , cough is dry   he denies fever chills chest pain abdominal pain headache nausea vomiting diarrhea   blood pressure noted to be significantly elevated up to 190/95  Patient denies smoking and stated that he quit approximately one year ago   Despite given IV Solu-Medrol , nebz and magnesium  he is still complaining of being dyspneic  O2 sat in high 90s on supplemental oxygen via nasal cannula  Patient admitted on an inpatient basis for further management of COPD exacerbation and reevaluation                Review of Systems:    Review of Systems   Respiratory: Positive for cough, chest tightness and shortness of breath  Past Medical and Surgical History:     Past Medical History:   Diagnosis Date    Cardiac disease     COPD (chronic obstructive pulmonary disease)     Coronary artery disease     Diabetes mellitus     Hyperlipidemia     Hypertension     MI (myocardial infarction)     with 3 stents    Prostate cancer        Past Surgical History:   Procedure Laterality Date    ABDOMINAL SURGERY      exploratory    PROSTATE SURGERY         Meds/Allergies:    Prior to Admission medications    Medication Sig Start Date End Date Taking? Authorizing Provider   albuterol (2 5 mg/3 mL) 0 083 % nebulizer solution Take 2 5 mg by nebulization Indications: 2-3 times a day PRN  Historical Provider, MD   cyclobenzaprine (FLEXERIL) 10 mg tablet Take 10 mg by mouth daily      Historical Provider, MD   Insulin Regular Human, Conc, (HUMULIN R U-500 KWIKPEN) 500 UNIT/ML SOPN Inject 0 1 mL under the skin every 6 (six) hours for 30 days 11/17/16 12/17/16  Eagle Ferreira PA-C   lisinopril (ZESTRIL) 40 mg tablet Take 40 mg by mouth daily  Historical Provider, MD   metoprolol tartrate (LOPRESSOR) 25 mg tablet Take 25 mg by mouth daily  Historical Provider, MD   Omega-3 Fatty Acids (FISH OIL) 1200 MG CAPS Take 1,200 mg by mouth daily  Historical Provider, MD   oxyCODONE-acetaminophen (PERCOCET) 7 5-325 MG per tablet Take 1 tablet by mouth 2 (two) times a day  Historical Provider, MD   simvastatin (ZOCOR) 40 mg tablet Take 40 mg by mouth daily at bedtime  Historical Provider, MD   aspirin 325 mg tablet Take 81 mg by mouth daily    7/17/17  Historical Provider, MD     I have reviewed home medications with a medical source (PCP, Pharmacy, other)  Allergies: Allergies   Allergen Reactions    Metformin        Social History:     Marital Status: /Civil Union   Occupation: none  Patient Pre-hospital Living Situation:home  Patient Pre-hospital Level of Mobility: reg  Patient Pre-hospital Diet Restrictions: reg  Substance Use History:   History   Alcohol Use No     History   Smoking Status    Former Smoker    Packs/day: 1 00   Smokeless Tobacco    Never Used     Comment: taking the patch currently     History   Drug Use No       Family History:    non-contributory    Physical Exam:     Vitals:   Blood Pressure: 141/95 (07/17/17 0131)  Pulse: 105 (07/17/17 0131)  Temperature: 98 8 °F (37 1 °C) (07/16/17 2036)  Temp Source: Tympanic (07/16/17 2036)  Respirations: 22 (07/17/17 0131)  Height: 6' (182 9 cm) (07/16/17 2036)  Weight - Scale: 127 kg (280 lb) (07/16/17 2036)  SpO2: 99 % (07/17/17 0131)    Physical Exam   Constitutional: He appears well-developed  Patient still able to talk in full sentences   HENT:   Head: Atraumatic  Eyes: EOM are normal    Neck: Normal range of motion  Cardiovascular: Regular rhythm  Pulmonary/Chest: No respiratory distress  Abdominal:   Obese nontender nondistended   Musculoskeletal: He exhibits edema  Neurological: He is alert  Skin:   Venous stasis changes   Psychiatric: He has a normal mood and affect  Additional Data:     Lab Results: I have personally reviewed pertinent reports  Results from last 7 days  Lab Units 07/16/17 2056   WBC Thousand/uL 14 39*   HEMOGLOBIN g/dL 13 4   HEMATOCRIT % 41 1   PLATELETS Thousands/uL 168   NEUTROS PCT % 82*   LYMPHS PCT % 11*   MONOS PCT % 6   EOS PCT % 1       Results from last 7 days  Lab Units 07/16/17 2056   SODIUM mmol/L 138   POTASSIUM mmol/L 4 3   CHLORIDE mmol/L 99*   CO2 mmol/L 34*   BUN mg/dL 18   CREATININE mg/dL 1 24   CALCIUM mg/dL 8 6   TOTAL PROTEIN g/dL 7 5   BILIRUBIN TOTAL mg/dL 0 24   ALK PHOS U/L 81   ALT U/L 71   AST U/L 67*   GLUCOSE RANDOM mg/dL 257*           Imaging: I have personally reviewed pertinent reports  No results found  EKG, Pathology, and Other Studies Reviewed on Admission:   · EKG: sinus tachy    Allscripts / Epic Records Reviewed: Yes     ** Please Note: This note has been constructed using a voice recognition system   **

## 2017-07-17 NOTE — ED NOTES
Per Dr Katie Pascal pt ok to go to the bathroom in wheelchair  EDT transported pt to the bathroom in wheelchair       Jodi Bueno RN  07/16/17 9633

## 2017-07-17 NOTE — CASE MANAGEMENT
Initial Clinical Review    Admission: Date/Time/Statement: 7/17/17 @ 0102 Inpatient Written      Orders Placed This Encounter   Procedures    Inpatient Admission (expected length of stay for this patient is greater than two midnights)     Standing Status:   Standing     Number of Occurrences:   1     Order Specific Question:   Admitting Physician     Answer:   Wang Pineda     Order Specific Question:   Level of Care     Answer:   Med Surg [16]     Order Specific Question:   Bed request comments     Answer:   telemetry     Order Specific Question:   Estimated length of stay     Answer:   More than 2 Midnights     Order Specific Question:   Certification     Answer:   I certify that inpatient services are medically necessary for this patient for a duration of greater than two midnights  See H&P and MD Progress Notes for additional information about the patient's course of treatment  ED: Date/Time/Mode of Arrival:   ED Arrival Information     Expected Arrival Acuity Means of Arrival Escorted By Service Admission Type    - 7/16/2017 20:34 Emergent Walk-In Self General Medicine Emergency    Arrival Complaint    SOB          Chief Complaint:   Chief Complaint   Patient presents with    Shortness of Breath     shortness of breath worsening over the past few days       History of Illness:  History of COPD, CAD s/p PCI, IDDM, HTN, HLD, chronic cor pulmonale, who presents with shortness of breath  States the symptoms started 3 weeks ago  Two weeks ago he called his pulmonologist, and was prescribed a 5 day course of steroids, which he finished over a week ago  However, since then his symptoms have been worsening  He is on 2 5L at baseline when he walks around, but he has been wearing it more often and he has also been increasing his oxygen to 3L  He has not been using a rescue inhaler, and states he does not have one, and has not been using a nebulizer because he can't afford it   He also has had a cough, that is worse when lying flat, but non-productive, in addition to some chest pain in his right chest that he states is worse with inspiration and "because I'm working so hard to breath"  He denies syncope, though he states he felt close to passing out over the last few days  He denies fevers, chills, left-sided chest pain, N/V, hemoptysis, or leg pain  He denies recent travel or sick contacts   He has a history of CAD s/p PCI and has 3 stents, though he does not have a history of CHF      ED Vital Signs:   ED Triage Vitals   Temperature Pulse Respirations Blood Pressure SpO2   07/16/17 2036 07/16/17 2036 07/16/17 2036 07/16/17 2039 07/16/17 2036   98 8 °F (37 1 °C) (!) 112 (!) 26 (!) 183/70 96 %      Temp Source Heart Rate Source Patient Position BP Location FiO2 (%)   07/16/17 2036 07/16/17 2151 07/16/17 2151 07/16/17 2151 07/16/17 2322   Tympanic Monitor Sitting Left arm 2      Pain Score       07/16/17 2036       7        Wt Readings from Last 1 Encounters:   07/17/17 127 kg (280 lb)       Vital Signs (abnormal):   07/17/17 0012  --   109  20   190/95   96 %  Nasal cannula    07/16/17 2322  --   114  22   137/109   95 %  Nasal cannula    07/16/17 2151  --   106   26  159/63   99 %  --    07/16/17 2039  --  --  --   183/70   --  --      Abnormal Labs/Diagnostic Test Results:     Component      Latest Ref Rng & Units 7/16/2017 7/17/2017   WBC      4 31 - 10 16 Thousand/uL 14 39 (H) 15 00 (H)   Neutrophils Relative      43 - 75 % 82 (H)    Lymphocytes Relative      14 - 44 % 11 (L)    Neutrophils Absolute      1 85 - 7 62 Thousands/µL 11 67 (H)      Component      Latest Ref Rng & Units 7/16/2017 7/17/2017   Sodium      136 - 145 mmol/L 138 133 (L)   Chloride      100 - 108 mmol/L 99 (L) 94 (L)   CO2      21 - 32 mmol/L 34 (H) 29   Creatinine      0 60 - 1 30 mg/dL 1 24 1 71 (H)   Glucose      65 - 140 mg/dL 257 (H) 457 (H)   Calcium      8 3 - 10 1 mg/dL 8 6 8 2 (L)   AST      5 - 45 U/L 67 (H)    Albumin 3 5 - 5 0 g/dL 3 0 (L)      Hemoglobin A1C 8 8  H     Component      Latest Ref Rng & Units 7/17/2017 7/17/2017 7/17/2017   POC Glucose      65 - 140 mg/dl 436 (H) 461 (H) 441 (H)     EKG:  ST  CXR:  pending    ED Treatment:   Medication Administration from 07/16/2017 2034 to 07/17/2017 0758       Date/Time Order Dose Route Action     07/16/2017 2223 albuterol inhalation solution 10 mg 10 mg Nebulization Given     07/16/2017 2223 ipratropium (ATROVENT) 0 02 % inhalation solution 1 mg 1 mg Nebulization Given     07/17/2017 0039 methylPREDNISolone sodium succinate (Solu-MEDROL) injection 125 mg 125 mg Intravenous Given     07/17/2017 0041 magnesium sulfate 2 g/50 mL IVPB (premix) 2 g 2 g Intravenous New Bag     07/17/2017 0047 albuterol inhalation solution 10 mg 10 mg Nebulization Given     07/17/2017 0047 ipratropium (ATROVENT) 0 02 % inhalation solution 0 5 mg 0 5 mg Nebulization Given     07/17/2017 0402 sodium chloride 0 9 % infusion 75 mL/hr Intravenous New Bag     07/17/2017 0355 albuterol inhalation solution 2 5 mg 2 5 mg Nebulization Given     07/17/2017 0355 sodium chloride 0 9 % inhalation solution 3 mL 3 mL Nebulization Given     07/17/2017 0639 insulin lispro (HumaLOG) 100 units/mL subcutaneous injection 2-12 Units 12 Units Subcutaneous Given     07/17/2017 0354 cyclobenzaprine (FLEXERIL) tablet 10 mg 10 mg Oral Given     07/17/2017 0354 oxyCODONE-acetaminophen (PERCOCET) 5-325 mg per tablet 1 tablet 1 tablet Oral Given     07/17/2017 0639 insulin lispro (HumaLOG) 100 units/mL subcutaneous injection 5 Units 5 Units Subcutaneous Given          Past Medical/Surgical History:    Active Ambulatory Problems     Diagnosis Date Noted    Coronary artery disease involving native coronary artery without angina pectoris 01/20/2016    Chest pain 01/20/2016    Gastroesophageal reflux disease without esophagitis 01/20/2016    COPD exacerbation 01/20/2016    Obesity (BMI 30-39 9) 01/20/2016    Dyslipidemia 01/20/2016    H/O prostate cancer 01/20/2016    Back pain 11/12/2016    Dizziness 11/12/2016    Type 2 diabetes mellitus with hyperglycemia 11/13/2016    Diabetic polyneuropathy associated with type 2 diabetes mellitus 11/15/2016    Venous stasis dermatitis of both lower extremities 11/15/2016    Opioid dependence 11/17/2016     Resolved Ambulatory Problems     Diagnosis Date Noted    No Resolved Ambulatory Problems     Past Medical History:   Diagnosis Date    Cardiac disease     COPD (chronic obstructive pulmonary disease)     Coronary artery disease     Diabetes mellitus     Hyperlipidemia     Hypertension     MI (myocardial infarction)     Prostate cancer        Admitting Diagnosis: SOB (shortness of breath) [R06 02]  COPD exacerbation [J44 1]    Age/Sex: 73 y o  male    Assessment/Plan:   Hospital Problem List:      Principal Problem:    COPD exacerbation  Active Problems:    Coronary artery disease involving native coronary artery without angina pectoris    Obesity (BMI 30-39  9)    Dyslipidemia    Diabetic polyneuropathy associated with type 2 diabetes mellitus    Venous stasis dermatitis of both lower extremities    Opioid dependence    Leukocytosis     Plan for the Primary Problem(s):  · Dyspnea secondary to COPD exacerbation  · Pulmonary evaluation  · Continue supplemental oxygen  · Continue IV steroids, nebs, respiratory protocol  Eventually will need LABA/ICS  ? Echocardiogram  ? Leukocytosis most likely secondary to recent steroid use patient denies productive cough fever chills  Started on levofloxacin p o  for COPD exacerbation     Plan for Additional Problems:   · Obesity  Low-calorie diets  · Diabetes  Continue insulin sliding scale , would adjust given the patient on steroids which might impact his glucose level  · Hypertension  Hydralazine p r n  ordered   · Hyperlipidemia   Continue statin     VTE Prophylaxis: Enoxaparin (Lovenox)  / sequential compression device   Code Status: full  POLST: There is no POLST form on file for this patient (pre-hospital)     Anticipated Length of Stay:  Patient will be admitted on an Inpatient basis with an anticipated length of stay of  >2 midnights  Justification for Hospital Stay: IV steroid pulmonary evaluation    Admission Orders:  Telemetry, cont puls ox  Mani CHO controlled diet  OOB as len  Accucheck q2h  Respiratory protocol  Consult endocrinology  O2 NC 2L  Sequential compression device    Scheduled Meds:   cyclobenzaprine 10 mg Oral Daily   docusate sodium 100 mg Oral BID   enoxaparin 40 mg Subcutaneous Daily   fish oil 1,000 mg Oral Daily   guaiFENesin 600 mg Oral Q12H DRAKE   ipratropium 0 5 mg Nebulization TID   levalbuterol 1 25 mg Nebulization TID   levofloxacin 500 mg Oral Daily   lisinopril 40 mg Oral Daily   methylPREDNISolone sodium succinate 40 mg Intravenous Q8H   metoprolol tartrate 25 mg Oral Daily   pravastatin 80 mg Oral Daily With Dinner   senna 1 tablet Oral Daily     Continuous Infusions:   insulin regular (HumuLIN R,NovoLIN R) infusion 0 3-21 Units/hr Last Rate: 10 Units/hr (07/17/17 1548)   sodium chloride 75 mL/hr      PRN Meds:   acetaminophen    hydrALAZINE    ipratropium    levalbuterol    ondansetron    oxyCODONE-acetaminophen    polyethylene glycol      [facility] in the [facility] by Onofre Carroll for 2017  Network Utilization Review Department  Phone: [phone]; Fax [phone]  ATTENTION: The Network Utilization Review Department is now centralized for our 7 Facilities  Make a note that we have a new phone and fax numbers for our Department  Please call with any questions or concerns to [phone] and carefully follow the prompts so that you are directed to the right person  All voicemails are confidential  Fax any determinations, approvals, denials, and requests for initial or continue stay review clinical to [phone]    **Due to HIGH CALL volume, it would be easier if you could please send daily logs  This will expedite your requests and in part, help us provide discharge notifications faster   **

## 2017-07-17 NOTE — PROGRESS NOTES
POST-ADMIT CHECK    S:  Mr Kiki Garcia states that he is feeling better today  He reports that he had recently finished an outpatient PO steroid taper about 1 week prior to admission  He reports that he is currently now SOB, however his wife present at bedside reports that he continues to be with ambulation  He is still requiring O2 NC, which he uses 2 5-3L PRN with increased frequency as of late at home  Occasional cough with no fever or chills  No CP  Patient reports that he does not take U500 insulin at home due to cost but rather takes 70/30 55 units qith breakfast, 55 units with lunch, and 45 units with dinner    O:  General: Obese male seen with wife present at bedside  He is cooperative  Heart: distant heat sounds  S1/S2, no murmur appreciate  Lungs: Distant breath sounds, on 3L NC O2  Abdomen: obese, soft, non-tender, normoactive bowel sounds  Neuro: Patient able to move all 4 extremities     A/P:  · COPD with acute exacerbation  · Pulm following  · IV steroid to taper - would appreciate pulm recommendations  · CXR with no active pulm disease  · Leukocytosis  · Suspect steroid-induced  · MARANDA  · In setting of hyperglycemia  Suspect 2/2 dehydration  IVF have been restarted   · Type 2 DM with long-term insulin use and leukocytosis  · Upon reviewing home meds, patient is to be taking U500 insulin, however after discussing with the patient he does not take this at home 2/2 cost  He reports taking 70/30 55 units with breakfast, 55 units with lunch, and 45 units with dinner  Patient was started on SSI on admission  · Acute hyperglycemia suspect steroid induced  Discussed with Dr Evans Mcdonnell (endocrinology) who recommended insulin GTT at this time while on IV steroids   · Would appreciate formal endocrinology consult  · HbA1c 8 8  · Diabetic diet   · HTN  · BP stable   Continue antihypertensives  · HLD  · Continue statin    Mallory Kerr PA-C

## 2017-07-17 NOTE — CONSULTS
Consultation - Pulmonary Medicine   Saqib Hyatt Sr  73 y o  male MRN: [id number]  Unit/Bed#: -01 Encounter: [id number]      Assessment:  74 yo male with pmhx of COPD,  Pulmonary hypertension and cor pulmonale, Dm2 and obesity  Presented with worsening dyspnea and non productive cough ([facility] COPD excerbation)  1) COPD exacerbation  2) Leukocytosis  3) Dm2   4) Pulmonary hypertension and cor pulmonale      Plan:   1) COPD exacerbation- Pt has received Ipratropium and Albuterol inhalations and IV Methylprednisolone in the ED  States that his symptoms have substantially improved over the last few hours  Pt is resting comfortably seated on the bed with his legs over the edge  Has O2 NC at 3L and is comfortable at rest  Given how quickly the patient has returned with similar symptoms, I believe that it would be reasonable to add a short acting bronchodilator for symptomatic improvement (while this is not expected to improve his overall disease course, it may well improve his symptoms subjectively)  Furthermore, a steroid taper or 5 day course of 40mg prednisone would be reasonable at the time of discharge or once the iv steroids are d/c'd    2) Leukocytosis- Likely 2/2 to steroid use and demargination of neutrophils  3) Dm2- A1C 8 8 and -450 (again likely 2/2 steroids)   4) Pulmonary hypertension and cor pulmonale- I would also consider exchanging his O2 NC at night for a CPAP, since he states that his non-compliance arises from the NC falling out and becoming frustrated, though it is likely he will fail this too, if he is able to tolerate the CPAP, the benefit would be substantive  With further discussion, he states that he is claustrophobic and doubts he would be able to tolerate a CPAP mask           History of Present Illness   Physician Requesting Consult: Smita Pacheco MD  Reason for Consult / Principal Problem: COPD exacerbation management  HPI: Saqib Hyatt Sr  is a 73 y o  male with history of obesity, Dm2, CAD s/p cath, COPD on 2L oxygen via nc at home, pulmonary hypertension and cor pulmonale who presented to the emergency room with worsening dyspnea, wheezes and nonproductive cough  3 weeks ago he was seen by pulmonologist for similar symptoms and started on steroid taper  He completed the steroid taper one week ago with no significant improvement of symptoms  Pt states that he does not have a "rescue inhaler or breathing treatments" due to cost  Over the course of the last week he began to experience worsening dyspnea and SOB with minimal exertion  Functionally, he describes being overcome with SOB after ambulating between rooms in his house  With regards to his home oxygen usage, he states that he has been minimally compliant with the night-time usage of his Nc  He denies orthopnea nocturnal SOB fever chills chest pain abdominal pain headache nausea vomiting diarrhea changes to vision or syncopal episodes  He was given IV Solu-Medrol , nebulizer treatments and magnesium in the Ed, but he was still complaining of dyspnea despite having O2 sat in high 90s on supplemental oxygen via nasal cannula  This has since resolved and he states that he is comfortable and no longer SOB  Inpatient consult to Pulmonology  Performed by: Ruth Phillips  Authorized by: Heather Ortiz           Review of Systems   Constitutional: Positive for activity change and fatigue  Negative for appetite change, chills, diaphoresis and fever  HENT: Negative for congestion, postnasal drip and rhinorrhea  Eyes: Negative for visual disturbance  Respiratory: Positive for cough  Negative for apnea, chest tightness, shortness of breath and wheezing  Cardiovascular: Negative for chest pain  Gastrointestinal: Negative for abdominal distention, abdominal pain, blood in stool, constipation, diarrhea, nausea and vomiting  Genitourinary: Negative for difficulty urinating, dysuria and frequency     Neurological: Negative for dizziness, syncope, light-headedness, numbness and headaches         Historical Information   Past Medical History:   Diagnosis Date    Cardiac disease     COPD (chronic obstructive pulmonary disease)     Coronary artery disease     Diabetes mellitus     Hyperlipidemia     Hypertension     MI (myocardial infarction)     with 3 stents    Prostate cancer      Past Surgical History:   Procedure Laterality Date    ABDOMINAL SURGERY      exploratory    PROSTATE SURGERY       Social History   History   Alcohol Use No     History   Drug Use No     History   Smoking Status    Former Smoker    Packs/day: 1 00   Smokeless Tobacco    Never Used     Comment: taking the patch currently     Occupational History: *    Family History: non-contributory    Meds/Allergies   all current active meds have been reviewed and current meds:   Current Facility-Administered Medications   Medication Dose Route Frequency    acetaminophen (TYLENOL) tablet 650 mg  650 mg Oral Q6H PRN    cyclobenzaprine (FLEXERIL) tablet 10 mg  10 mg Oral Daily    docusate sodium (COLACE) capsule 100 mg  100 mg Oral BID    enoxaparin (LOVENOX) subcutaneous injection 40 mg  40 mg Subcutaneous Daily    fish oil capsule 1,000 mg  1,000 mg Oral Daily    guaiFENesin (MUCINEX) 12 hr tablet 600 mg  600 mg Oral Q12H [facility]    hydrALAZINE (APRESOLINE) injection 5 mg  5 mg Intravenous Q6H PRN    insulin lispro (HumaLOG) 100 units/mL subcutaneous injection 2-12 Units  2-12 Units Subcutaneous TID AC    insulin lispro (HumaLOG) 100 units/mL subcutaneous injection 2-12 Units  2-12 Units Subcutaneous HS    ipratropium (ATROVENT) 0 02 % inhalation solution 0 5 mg  0 5 mg Nebulization TID    ipratropium (ATROVENT) 0 02 % inhalation solution 0 5 mg  0 5 mg Nebulization Q6H PRN    levalbuterol (XOPENEX) inhalation solution 1 25 mg  1 25 mg Nebulization TID    levalbuterol (XOPENEX) inhalation solution 1 25 mg  1 25 mg Nebulization Q6H PRN    levofloxacin (LEVAQUIN) tablet 500 mg  500 mg Oral Daily    lisinopril (ZESTRIL) tablet 40 mg  40 mg Oral Daily    methylPREDNISolone sodium succinate (Solu-MEDROL) injection 40 mg  40 mg Intravenous Q8H    metoprolol tartrate (LOPRESSOR) tablet 25 mg  25 mg Oral Daily    ondansetron (ZOFRAN) injection 4 mg  4 mg Intravenous Q6H PRN    oxyCODONE-acetaminophen (PERCOCET) 5-325 mg per tablet 1 tablet  1 tablet Oral Q6H PRN    polyethylene glycol (MIRALAX) packet 17 g  17 g Oral Daily PRN    pravastatin (PRAVACHOL) tablet 80 mg  80 mg Oral Daily With Dinner    senna (SENOKOT) tablet 8 6 mg  1 tablet Oral Daily       Allergies   Allergen Reactions    Metformin        Objective   Vitals: Blood pressure 129/55, pulse 99, temperature 98 1 °F (36 7 °C), temperature source Oral, resp  rate 19, height 6' (1 829 m), weight 127 kg (280 lb), SpO2 96 %  ,Body mass index is 37 97 kg/m²  No intake or output data in the 24 hours ending 07/17/17 0926  Invasive Devices     Peripheral Intravenous Line            Peripheral IV 07/16/17 Right Antecubital less than 1 day                Physical Exam   Constitutional: He appears well-developed and well-nourished  NAD, resting comfortably, seated at the edge of the bed, doing a crossword puzzle  HENT:   Head: Normocephalic and atraumatic  Mouth/Throat: Oropharynx is clear and moist    Eyes: Pupils are equal, round, and reactive to light  Cardiovascular: Normal rate and regular rhythm  Exam reveals no gallop and no friction rub  No murmur heard  Pulmonary/Chest: Effort normal and breath sounds normal  No respiratory distress  He has no rales  He exhibits no tenderness  No accessory muscle of respiration recruitment, no tugging, not tripoding  Mild end expiratory wheezing in b/l posterior lungs  Abdominal: Soft  Bowel sounds are normal  He exhibits no distension  There is no tenderness  There is no rebound and no guarding  Neurological: He is alert     Skin: Skin is warm and dry  Rash noted  Venous stasis dermatitis b/l lower extremities  Lab Results:   I have personally reviewed pertinent lab results  ,   Lab Results   Component Value Date    WBC 15 00 (H) 07/17/2017    HGB 12 4 07/17/2017    HCT 39 3 07/17/2017    MCV 87 07/17/2017     07/17/2017    MCH 27 3 07/17/2017    MCHC 31 6 07/17/2017    RDW 15 1 07/17/2017    MPV 10 4 07/17/2017    NRBC 0 07/16/2017   , CMP:   Lab Results   Component Value Date     (L) 07/17/2017    K 4 4 07/17/2017    CL 94 (L) 07/17/2017    CO2 29 07/17/2017    ANIONGAP 10 07/17/2017    BUN 22 07/17/2017    CREATININE 1 71 (H) 07/17/2017    GLUCOSE 457 (H) 07/17/2017    CALCIUM 8 2 (L) 07/17/2017    AST 67 (H) 07/16/2017    ALT 71 07/16/2017    ALKPHOS 81 07/16/2017    PROT 7 5 07/16/2017    ALBUMIN 3 0 (L) 07/16/2017    BILITOT 0 24 07/16/2017    EGFR 39 9 07/17/2017   Imaging Studies: I have personally reviewed pertinent reports  EKG, Pathology, and Other Studies: I have personally reviewed pertinent reports  VTE Prophylaxis: Enoxaparin (Lovenox)    Code Status: Level 1 - Full Code  Advance Directive and Living Will:      Power of :    POLST:      Counseling/Coordination of Care: Total floor / unit time spent today 30 minutes  Greater than 50% of total time was spent with the patient and / or family counseling and / or coordination of care  A description of the counseling / coordination of care: with regards to his home O2 regimen and reinforcing continued abstention from smoking as he is 1 year removed from smoking

## 2017-07-17 NOTE — ED PROVIDER NOTES
History  Chief Complaint   Patient presents with    Shortness of Breath     shortness of breath worsening over the past few days     History of COPD, CAD s/p PCI, IDDM, HTN, HLD, chronic cor pulmonale, who presents with shortness of breath  States the symptoms started 3 weeks ago  Two weeks ago he called his pulmonologist, and was prescribed a 5 day course of steroids, which he finished over a week ago  HOwever, since then his symptoms have been worsening  He is on 2 5L at baseline when he walks around, but he has been wearing it more often and he has also been increasing his oxygen to 3L  He has not been using a rescue inhaler, and states he does not have one, and has not been using a nebulizer because he can't afford it  He also has had a cough, that is worse when lying flat, but non-productive, in addition to some chest pain in his right chest that he states is worse with inspiration and "because I'm working so hard to breath"  He denies syncope, though he states he felt close to passing out over the last few days  He denies fevers, chills, left-sided chest pain, N/V, hemoptysis, or leg pain  He denies recent travel or sick contacts  He has a history of CAD s/p PCI and has 3 stents, though he does not have a history of CHF  Prior to Admission Medications   Prescriptions Last Dose Informant Patient Reported? Taking? Insulin Regular Human, Conc, (HUMULIN R U-500 KWIKPEN) 500 UNIT/ML SOPN   No No   Sig: Inject 0 1 mL under the skin every 6 (six) hours for 30 days   Omega-3 Fatty Acids (FISH OIL) 1200 MG CAPS  Self Yes No   Sig: Take 1,200 mg by mouth daily  albuterol (2 5 mg/3 mL) 0 083 % nebulizer solution   Yes No   Sig: Take 2 5 mg by nebulization Indications: 2-3 times a day PRN  aspirin 325 mg tablet   Yes No   Sig: Take 81 mg by mouth daily     cyclobenzaprine (FLEXERIL) 10 mg tablet   Yes No   Sig: Take 10 mg by mouth daily     lisinopril (ZESTRIL) 40 mg tablet  Self Yes No   Sig: Take 40 mg by mouth daily  metoprolol tartrate (LOPRESSOR) 25 mg tablet   Yes No   Sig: Take 25 mg by mouth daily  oxyCODONE-acetaminophen (PERCOCET) 7 5-325 MG per tablet   Yes No   Sig: Take 1 tablet by mouth 2 (two) times a day  simvastatin (ZOCOR) 40 mg tablet   Yes No   Sig: Take 40 mg by mouth daily at bedtime  Facility-Administered Medications: None       Past Medical History:   Diagnosis Date    Cardiac disease     COPD (chronic obstructive pulmonary disease)     Coronary artery disease     Diabetes mellitus     Hyperlipidemia     Hypertension     MI (myocardial infarction)     with 3 stents    Prostate cancer        Past Surgical History:   Procedure Laterality Date    ABDOMINAL SURGERY      exploratory    PROSTATE SURGERY         Family History   Problem Relation Age of Onset    Heart disease Father      I have reviewed and agree with the history as documented  Social History   Substance Use Topics    Smoking status: Former Smoker     Packs/day: 1 00    Smokeless tobacco: Never Used      Comment: taking the patch currently    Alcohol use No        Review of Systems   Constitutional: Negative for chills and fever  HENT: Negative for sore throat  Eyes: Negative for visual disturbance  Respiratory: Positive for cough (nonproductive) and shortness of breath  Negative for chest tightness and wheezing  Cardiovascular: Positive for chest pain (right sided, pleuritic)  Negative for palpitations and leg swelling  Gastrointestinal: Negative for nausea and vomiting  Endocrine: Negative for polyuria  Genitourinary: Negative for flank pain  Musculoskeletal: Negative for neck pain  Skin: Negative for rash  Neurological: Negative for syncope and weakness         Physical Exam  ED Triage Vitals   Temperature Pulse Respirations Blood Pressure SpO2   07/16/17 2036 07/16/17 2036 07/16/17 2036 07/16/17 2039 07/16/17 2036   98 8 °F (37 1 °C) (!) 112 (!) 26 (!) 183/70 96 %      Temp Source Heart Rate Source Patient Position BP Location FiO2 (%)   07/16/17 2036 07/16/17 2151 07/16/17 2151 07/16/17 2151 --   Tympanic Monitor Sitting Left arm       Pain Score       07/16/17 2036       7           Physical Exam   Constitutional: He is oriented to person, place, and time  Awake and alert, diaphoretic, Moderate respiratory distress  HENT:   Head: Normocephalic  Mouth/Throat: Oropharynx is clear and moist  No oropharyngeal exudate  Eyes: Pupils are equal, round, and reactive to light  No scleral icterus  Neck: Normal range of motion  No JVD present  Cardiovascular: Regular rhythm and normal heart sounds  Tachycardia present  Exam reveals no friction rub  No murmur heard  Pulmonary/Chest:   Tachypneic  Breath sounds with scattered wheezing and decreased air movement, with no rales or rhonchi appreciated  Abdominal: Soft  There is no tenderness  Musculoskeletal: Normal range of motion  He exhibits no edema  Lymphadenopathy:     He has no cervical adenopathy  Neurological: He is alert and oriented to person, place, and time  No cranial nerve deficit  Skin: Skin is warm and dry  ED Medications  Medications   albuterol inhalation solution 10 mg (not administered)   ipratropium (ATROVENT) 0 02 % inhalation solution 1 mg (not administered)       Diagnostic Studies  Labs Reviewed   COMPREHENSIVE METABOLIC PANEL - Abnormal        Result Value Ref Range Status    Chloride 99 (*) 100 - 108 mmol/L Final    CO2 34 (*) 21 - 32 mmol/L Final    Glucose 257 (*) 65 - 140 mg/dL Final    Comment: If the patient is fasting, the ADA then defines impaired fasting glucose as > 100 mg/dL and diabetes as > or equal to 123 mg/dL      AST 67 (*) 5 - 45 U/L Final    Albumin 3 0 (*) 3 5 - 5 0 g/dL Final    Sodium 138  136 - 145 mmol/L Final    Potassium 4 3  3 5 - 5 3 mmol/L Final    Anion Gap 5  4 - 13 mmol/L Final    BUN 18  5 - 25 mg/dL Final    Creatinine 1 24  0 60 - 1 30 mg/dL Final Comment: Standardized to IDMS reference method    Calcium 8 6  8 3 - 10 1 mg/dL Final    ALT 71  12 - 78 U/L Final    Alkaline Phosphatase 81  46 - 116 U/L Final    Total Protein 7 5  6 4 - 8 2 g/dL Final    Total Bilirubin 0 24  0 20 - 1 00 mg/dL Final    eGFR 57 8  ml/min/1 73sq m Final    Narrative:     National Kidney Disease Education Program recommendations are as follows:  GFR calculation is accurate only with a steady state creatinine  Chronic Kidney disease less than 60 ml/min/1 73 sq  meters  Kidney failure less than 15 ml/min/1 73 sq  meters     CBC AND DIFFERENTIAL - Abnormal     WBC 14 39 (*) 4 31 - 10 16 Thousand/uL Final    Neutrophils Relative 82 (*) 43 - 75 % Final    Lymphocytes Relative 11 (*) 14 - 44 % Final    Neutrophils Absolute 11 67 (*) 1 85 - 7 62 Thousands/µL Final    RBC 4 86  3 88 - 5 62 Million/uL Final    Hemoglobin 13 4  12 0 - 17 0 g/dL Final    Hematocrit 41 1  36 5 - 49 3 % Final    MCV 85  82 - 98 fL Final    MCH 27 6  26 8 - 34 3 pg Final    MCHC 32 6  31 4 - 37 4 g/dL Final    RDW 15 1  11 6 - 15 1 % Final    MPV 9 9  8 9 - 12 7 fL Final    Platelets 226  632 - 390 Thousands/uL Final    nRBC 0  /100 WBCs Final    Monocytes Relative 6  4 - 12 % Final    Eosinophils Relative 1  0 - 6 % Final    Basophils Relative 0  0 - 1 % Final    Lymphocytes Absolute 1 51  0 60 - 4 47 Thousands/µL Final    Monocytes Absolute 0 91  0 17 - 1 22 Thousand/µL Final    Eosinophils Absolute 0 17  0 00 - 0 61 Thousand/µL Final    Basophils Absolute 0 02  0 00 - 0 10 Thousands/µL Final   POCT TROPONIN - Normal    POC Troponin I 0 00  0 00 - 0 08 ng/ml Final    Specimen Type VENOUS   Final    Narrative:     [employer] i-Stat handheld analyzer 99% cutoff is > 0 08ng/mL in network Emergency Departments    o cTnI 99% cutoff is useful only when applied to patients in the clinical setting of myocardial ischemia  o cTnI 99% cutoff should be interpreted in the context of clinical history, ECG findings and possibly cardiac imaging to establish correct diagnosis  o cTnI 99% cutoff may be suggestive but clearly not indicative of a coronary event without the clinical setting of myocardial ischemia  LIGHT BLUE TOP   GOLD TOP ON HOLD   LAVENDER TOP 7 ML ON HOLD       XR chest 1 view portable    (Results Pending)       Procedures  ECG 12 Lead Documentation  Date/Time: 7/16/2017 11:08 PM  Performed by: Marcella Vicente  Authorized by: Dragan SUAREZ     ECG reviewed by me, the ED Provider: yes    Patient location:  ED  Interpretation:     Interpretation: abnormal    Rate:     ECG rate assessment: tachycardic    Rhythm:     Rhythm: sinus tachycardia    Ectopy:     Ectopy: none    QRS:     QRS axis:  Right  Conduction:     Conduction: abnormal      Abnormal conduction: incomplete LBBB    ST segments:     ST segments:  Normal  T waves:     T waves: non-specific    Comments:      Sinus tachycardia, RAD, incomplete RBBB, unchanged from prior EKG          Phone Consults  ED Phone Contact    ED Course  ED Course                             MDM  Number of Diagnoses or Management Options  Leukocytosis:   Diagnosis management comments: Assessment/Plan:     73year old male with shortness of breath, SATs in the low 90s on RA, responsive to supplemental O2  Scattered wheezing on exam, but not moving much air  Will start de la rosa neb, portable CXR, EKG, and reassess  Also will get CMP, CBC      ED course    CXR unchanged from prior, no signs of PNA  WBC 14 39, but of note he finished a brief course of steroids last week  After nebulizer treatment, his breath sounds are now clear, but he remains dyspneic and mildly distressed, as well as tachycardic  His peakflow remains low, and he is still not moving much air on exam despite his wheezing being decreased  Will give 1 more hartneb, and solumedrol and 2g Mg  Plan to admit to medicine for acute COPD exacerbation       CritCare Time    Disposition  Final diagnoses:   None     ED Disposition     None Follow-up Information    None       Patient's Medications   Discharge Prescriptions    No medications on file     No discharge procedures on file  ED Provider  Attending physically available and evaluated Awilda Raymundo I managed the patient along with the ED Attending      Electronically Signed by       Carmenza Lynch MD  Resident  07/17/17 8975

## 2017-07-18 LAB
ANION GAP SERPL CALCULATED.3IONS-SCNC: 6 MMOL/L (ref 4–13)
ARTERIAL PATENCY WRIST A: YES
BASE EXCESS BLDA CALC-SCNC: -1.4 MMOL/L
BUN SERPL-MCNC: 26 MG/DL (ref 5–25)
CALCIUM SERPL-MCNC: 8.2 MG/DL (ref 8.3–10.1)
CHLORIDE SERPL-SCNC: 99 MMOL/L (ref 100–108)
CO2 SERPL-SCNC: 31 MMOL/L (ref 21–32)
CREAT SERPL-MCNC: 1.26 MG/DL (ref 0.6–1.3)
ERYTHROCYTE [DISTWIDTH] IN BLOOD BY AUTOMATED COUNT: 14.8 % (ref 11.6–15.1)
GFR SERPL CREATININE-BSD FRML MDRD: 56.7 ML/MIN/1.73SQ M
GLUCOSE SERPL-MCNC: 156 MG/DL (ref 65–140)
GLUCOSE SERPL-MCNC: 213 MG/DL (ref 65–140)
GLUCOSE SERPL-MCNC: 216 MG/DL (ref 65–140)
GLUCOSE SERPL-MCNC: 243 MG/DL (ref 65–140)
GLUCOSE SERPL-MCNC: 249 MG/DL (ref 65–140)
GLUCOSE SERPL-MCNC: 252 MG/DL (ref 65–140)
GLUCOSE SERPL-MCNC: 274 MG/DL (ref 65–140)
GLUCOSE SERPL-MCNC: 282 MG/DL (ref 65–140)
GLUCOSE SERPL-MCNC: 309 MG/DL (ref 65–140)
GLUCOSE SERPL-MCNC: 322 MG/DL (ref 65–140)
GLUCOSE SERPL-MCNC: 323 MG/DL (ref 65–140)
GLUCOSE SERPL-MCNC: 348 MG/DL (ref 65–140)
HCO3 BLDA-SCNC: 24.2 MMOL/L (ref 22–28)
HCT VFR BLD AUTO: 38.2 % (ref 36.5–49.3)
HGB BLD-MCNC: 12.3 G/DL (ref 12–17)
MCH RBC QN AUTO: 26.9 PG (ref 26.8–34.3)
MCHC RBC AUTO-ENTMCNC: 32.2 G/DL (ref 31.4–37.4)
MCV RBC AUTO: 83 FL (ref 82–98)
NASAL CANNULA: 3
NT-PROBNP SERPL-MCNC: 438 PG/ML
O2 CT BLDA-SCNC: 17.8 ML/DL (ref 16–23)
OXYHGB MFR BLDA: 96.7 % (ref 94–97)
PCO2 BLDA: 44.2 MM HG (ref 36–44)
PH BLDA: 7.36 [PH] (ref 7.35–7.45)
PLATELET # BLD AUTO: 161 THOUSANDS/UL (ref 149–390)
PMV BLD AUTO: 10.4 FL (ref 8.9–12.7)
PO2 BLDA: 104.6 MM HG (ref 75–129)
POTASSIUM SERPL-SCNC: 4.7 MMOL/L (ref 3.5–5.3)
RBC # BLD AUTO: 4.58 MILLION/UL (ref 3.88–5.62)
SODIUM SERPL-SCNC: 136 MMOL/L (ref 136–145)
SPECIMEN SOURCE: ABNORMAL
WBC # BLD AUTO: 16.57 THOUSAND/UL (ref 4.31–10.16)

## 2017-07-18 PROCEDURE — 94760 N-INVAS EAR/PLS OXIMETRY 1: CPT

## 2017-07-18 PROCEDURE — 82805 BLOOD GASES W/O2 SATURATION: CPT | Performed by: INTERNAL MEDICINE

## 2017-07-18 PROCEDURE — 82948 REAGENT STRIP/BLOOD GLUCOSE: CPT

## 2017-07-18 PROCEDURE — 94640 AIRWAY INHALATION TREATMENT: CPT

## 2017-07-18 PROCEDURE — 80048 BASIC METABOLIC PNL TOTAL CA: CPT | Performed by: PHYSICIAN ASSISTANT

## 2017-07-18 PROCEDURE — 85027 COMPLETE CBC AUTOMATED: CPT | Performed by: PHYSICIAN ASSISTANT

## 2017-07-18 PROCEDURE — 83880 ASSAY OF NATRIURETIC PEPTIDE: CPT | Performed by: PHYSICIAN ASSISTANT

## 2017-07-18 RX ORDER — HEPARIN SODIUM 5000 [USP'U]/ML
5000 INJECTION, SOLUTION INTRAVENOUS; SUBCUTANEOUS EVERY 8 HOURS SCHEDULED
Status: DISCONTINUED | OUTPATIENT
Start: 2017-07-19 | End: 2017-07-21 | Stop reason: HOSPADM

## 2017-07-18 RX ORDER — HEPARIN SODIUM 5000 [USP'U]/ML
5000 INJECTION, SOLUTION INTRAVENOUS; SUBCUTANEOUS EVERY 8 HOURS SCHEDULED
Status: DISCONTINUED | OUTPATIENT
Start: 2017-07-18 | End: 2017-07-18

## 2017-07-18 RX ADMIN — Medication 1000 MG: at 08:12

## 2017-07-18 RX ADMIN — IPRATROPIUM BROMIDE 0.5 MG: 0.5 SOLUTION RESPIRATORY (INHALATION) at 20:10

## 2017-07-18 RX ADMIN — LEVOFLOXACIN 500 MG: 500 TABLET, FILM COATED ORAL at 08:13

## 2017-07-18 RX ADMIN — CYCLOBENZAPRINE HYDROCHLORIDE 10 MG: 10 TABLET, FILM COATED ORAL at 08:10

## 2017-07-18 RX ADMIN — OXYCODONE HYDROCHLORIDE AND ACETAMINOPHEN 1 TABLET: 5; 325 TABLET ORAL at 10:51

## 2017-07-18 RX ADMIN — LEVALBUTEROL HYDROCHLORIDE 1.25 MG: 1.25 SOLUTION, CONCENTRATE RESPIRATORY (INHALATION) at 07:30

## 2017-07-18 RX ADMIN — METHYLPREDNISOLONE SODIUM SUCCINATE 40 MG: 40 INJECTION, POWDER, FOR SOLUTION INTRAMUSCULAR; INTRAVENOUS at 23:50

## 2017-07-18 RX ADMIN — SENNOSIDES 8.6 MG: 8.6 TABLET, FILM COATED ORAL at 08:10

## 2017-07-18 RX ADMIN — LEVALBUTEROL HYDROCHLORIDE 1.25 MG: 1.25 SOLUTION, CONCENTRATE RESPIRATORY (INHALATION) at 13:51

## 2017-07-18 RX ADMIN — METHYLPREDNISOLONE SODIUM SUCCINATE 40 MG: 40 INJECTION, POWDER, FOR SOLUTION INTRAMUSCULAR; INTRAVENOUS at 16:19

## 2017-07-18 RX ADMIN — ENOXAPARIN SODIUM 40 MG: 40 INJECTION SUBCUTANEOUS at 08:10

## 2017-07-18 RX ADMIN — IPRATROPIUM BROMIDE 0.5 MG: 0.5 SOLUTION RESPIRATORY (INHALATION) at 13:50

## 2017-07-18 RX ADMIN — GUAIFENESIN 600 MG: 600 TABLET, EXTENDED RELEASE ORAL at 20:49

## 2017-07-18 RX ADMIN — LEVALBUTEROL HYDROCHLORIDE 1.25 MG: 1.25 SOLUTION, CONCENTRATE RESPIRATORY (INHALATION) at 20:10

## 2017-07-18 RX ADMIN — LISINOPRIL 40 MG: 20 TABLET ORAL at 08:10

## 2017-07-18 RX ADMIN — SODIUM CHLORIDE 16 UNITS/HR: 9 INJECTION, SOLUTION INTRAVENOUS at 19:45

## 2017-07-18 RX ADMIN — IPRATROPIUM BROMIDE 0.5 MG: 0.5 SOLUTION RESPIRATORY (INHALATION) at 07:30

## 2017-07-18 RX ADMIN — SODIUM CHLORIDE 14 UNITS/HR: 9 INJECTION, SOLUTION INTRAVENOUS at 12:01

## 2017-07-18 RX ADMIN — GUAIFENESIN 600 MG: 600 TABLET, EXTENDED RELEASE ORAL at 08:10

## 2017-07-18 RX ADMIN — METHYLPREDNISOLONE SODIUM SUCCINATE 40 MG: 40 INJECTION, POWDER, FOR SOLUTION INTRAMUSCULAR; INTRAVENOUS at 00:33

## 2017-07-18 RX ADMIN — METOPROLOL TARTRATE 25 MG: 25 TABLET ORAL at 08:10

## 2017-07-18 RX ADMIN — INSULIN LISPRO 16 UNITS: 100 INJECTION, SOLUTION INTRAVENOUS; SUBCUTANEOUS at 16:19

## 2017-07-18 RX ADMIN — DOCUSATE SODIUM 100 MG: 100 CAPSULE, LIQUID FILLED ORAL at 08:10

## 2017-07-18 RX ADMIN — METHYLPREDNISOLONE SODIUM SUCCINATE 40 MG: 40 INJECTION, POWDER, FOR SOLUTION INTRAMUSCULAR; INTRAVENOUS at 08:10

## 2017-07-18 RX ADMIN — SODIUM CHLORIDE 7 UNITS/HR: 9 INJECTION, SOLUTION INTRAVENOUS at 00:33

## 2017-07-18 RX ADMIN — PRAVASTATIN SODIUM 80 MG: 80 TABLET ORAL at 16:19

## 2017-07-18 NOTE — CASE MANAGEMENT
Continued Stay Review    Date: 7/18    Vital Signs: /58   Pulse 89   Temp 97 5 °F (36 4 °C) (Oral)   Resp 18   Ht 6' (1 829 m)   Wt 127 kg (280 lb)   SpO2 96%   BMI 37 97 kg/m²     Medications:   Scheduled Meds:   cyclobenzaprine 10 mg Oral Daily   docusate sodium 100 mg Oral BID   fish oil 1,000 mg Oral Daily   guaiFENesin 600 mg Oral Q12H [address]   [START ON 7/19/2017] heparin (porcine) 5,000 Units Subcutaneous Q8H [address]   ipratropium 0 5 mg Nebulization TID   levalbuterol 1 25 mg Nebulization TID   levofloxacin 500 mg Oral Daily   methylPREDNISolone sodium succinate 40 mg Intravenous Q8H   metoprolol tartrate 25 mg Oral Daily   pravastatin 80 mg Oral Daily With Dinner   senna 1 tablet Oral Daily     Continuous Infusions:   insulin regular (HumuLIN R,NovoLIN R) infusion 0 3-21 Units/hr Last Rate: 14 Units/hr (07/18/17 1000)     PRN Meds:   acetaminophen    hydrALAZINE    ipratropium    levalbuterol    ondansetron    oxyCODONE-acetaminophen    polyethylene glycol    Abnormal Labs/Diagnostic Results:    Updated: 07/18/17 0958    POC Glucose 323 (H) 65 - 140 mg/dl      Updated: 07/18/17 0547       WBC 16 57 (H) 4 31 - 10 16 Thousand/uL     Age/Sex: 73 y o  male     Assessment/Plan:   Assessment:     Principal Problem:    COPD exacerbation  Active Problems:    Coronary artery disease involving native coronary artery without angina pectoris    Obesity (BMI 30-39  9)    Dyslipidemia    Diabetic polyneuropathy associated with type 2 diabetes mellitus    Venous stasis dermatitis of both lower extremities    Opioid dependence    Leukocytosis      Plan:     · COPD with exacerbation  ? Pulm following, their recommendations are appreciated  ? Per pulm recommendation, continue solumedrol 40mg IV Q8hr today, likely wean tomorrow  ? Xopenex and atrovent Q6hr  ? Mucinex BID  ? Patient reports symptomatic improvement in SOB  · Acute on chronic hypoxic respiratory failure  ?  Patient continuing to require supplemental O2, currently on 3L  Wean as able  Ambulatory pulse ox prior to d/c   ? Outpatient sleep study  · RLL pulmonary nodule, 4mm  ? Outpatient follow up of 4mm RLL lung nodule  ? Outpatient pulm follow up  · Leukocytosis  ? Suspect steroid-induced  ? CXR without active pulmonary disease  ? Patient is afebrile, VSS  · MARANDA  ? Suspect was 2/2 dehydration in setting of hyperglycemia  ? Baseline creatinine appears to be 1 1-1 2  MARANDA currently improved, creatinine around baseline at 1 26  ? Stop IVF  ? Will hold lisinopril for now as BP has been stable; switch Lovenox to SC heparin for DVT ppx  · Type 2 diabetes mellitus with long term insulin use and hyperglycemia  ? Patient on Insulin GTT, suspect hyperglycemia steroid induced  ? Endocrinology consult pending - patient follows Dr Downey Ask as outpatient  · HTN  ? BP stable, continue lisinopril and lopressor  ? ADDENDUM: Lisinopril on hold given renal function with stable BP  · HLD  ? Continue statin  · Obesity  ? Encourage Diet, weight loss, exercise      VTE Pharmacologic Prophylaxis:   Pharmacologic: Enoxaparin (Lovenox) switching to Heparin SC tomorrow in setting of MARANDA  Mechanical VTE Prophylaxis in Place: Yes     Current Length of Stay: 1 day(s)     Current Patient Status: Inpatient   Certification Statement: The patient will continue to require additional inpatient hospital stay due to patient continuing to require IV steroids and IV insulin GTT     Discharge Plan: Pending clinical course   Steroid taper; insulin planning per formal endocrine consult; outpatient sleep study and outpatient follow up CT chest

## 2017-07-18 NOTE — PROGRESS NOTES
[facility] Progress Note  Patient: Jeronimo Sloan Sr  73 y o  male   MRN: [id number]  PCP: Jan Kaiser MD  Unit/Bed#: MS [facility] Encounter: [id number]  Date Of Visit: 07/18/17    Assessment:    Principal Problem:    COPD exacerbation  Active Problems:    Coronary artery disease involving native coronary artery without angina pectoris    Obesity (BMI 30-39  9)    Dyslipidemia    Diabetic polyneuropathy associated with type 2 diabetes mellitus    Venous stasis dermatitis of both lower extremities    Opioid dependence    Leukocytosis      Plan:    · COPD with exacerbation  · Pulm following, their recommendations are appreciated  · Per pulm recommendation, continue solumedrol 40mg IV Q8hr today, likely wean tomorrow  · Xopenex and atrovent Q6hr  · Mucinex BID  · Patient reports symptomatic improvement in SOB  · Acute on chronic hypoxic respiratory failure  · Patient continuing to require supplemental O2, currently on 3L  Wean as able  Ambulatory pulse ox prior to d/c   · Outpatient sleep study  · RLL pulmonary nodule, 4mm  · Outpatient follow up of 4mm RLL lung nodule  · Outpatient pulm follow up  · Leukocytosis  · Suspect steroid-induced  · CXR without active pulmonary disease  · Patient is afebrile, VSS  · MARANDA  · Suspect was 2/2 dehydration in setting of hyperglycemia  · Baseline creatinine appears to be 1 1-1 2   MARANDA currently improved, creatinine around baseline at 1 26  · Stop IVF  · Will hold lisinopril for now as BP has been stable; switch Lovenox to SC heparin for DVT ppx  · Type 2 diabetes mellitus with long term insulin use and hyperglycemia  · Patient on Insulin GTT, suspect hyperglycemia steroid induced  · Endocrinology consult pending - patient follows Dr Valeria Nunn as outpatient  · HTN  · BP stable, continue lisinopril and lopressor  · ADDENDUM: Lisinopril on hold given renal function with stable BP  · HLD  · Continue statin  · Obesity  · Encourage Diet, weight loss, exercise      VTE Pharmacologic Prophylaxis:   Pharmacologic: Enoxaparin (Lovenox) switching to Heparin SC tomorrow in setting of MARANDA  Mechanical VTE Prophylaxis in Place: Yes    Patient Centered Rounds: I have performed bedside rounds with nursing staff today  Discussions with Specialists or Other Care Team Provider: Pulm note appreciated  Discussed  With nursing     Education and Discussions with Family / Patient: Patient; I attempted to call the patient's wife at both the home and mobile numbers listed however she was unavailable     Time Spent for Care: 20 minutes  More than 50% of total time spent on counseling and coordination of care as described above  Current Length of Stay: 1 day(s)    Current Patient Status: Inpatient   Certification Statement: The patient will continue to require additional inpatient hospital stay due to patient continuing to require IV steroids and IV insulin GTT    Discharge Plan: Pending clinical course  Steroid taper; insulin planning per formal endocrine consult; outpatient sleep study and outpatient follow up CT chest    Code Status: Level 1 - Full Code      Subjective:   Mr Mary Brasher states that he would like to shower, however he is still on an insulin GTT  He states his appetite is good  Denies SOB  Still complaining of occasional, non-productive cough - unchanged from yesterday  Denies fever chills or CP or abdominal pain    Objective:     Vitals:   Temp (24hrs), Av 1 °F (36 7 °C), Min:97 5 °F (36 4 °C), Max:99 6 °F (37 6 °C)    HR:  [71-89] 89  Resp:  [18-20] 18  BP: (115-137)/(58-62) 124/58  SpO2:  [93 %-97 %] 96 %  Body mass index is 37 97 kg/m²  Input and Output Summary (last 24 hours):        Intake/Output Summary (Last 24 hours) at 17 1036  Last data filed at 17 1000   Gross per 24 hour   Intake          1328 75 ml   Output             2225 ml   Net          -896 25 ml       Physical Exam:     Physical Exam   Constitutional: He is oriented to person, place, and time  No distress  Patient seen sitting upright on edge of bed  He is cooperative  Obese male   HENT:   Mouth/Throat: Oropharynx is clear and moist    Cardiovascular: Normal rate and regular rhythm  Distant heart sounds   Pulmonary/Chest: Effort normal  No respiratory distress  He has no wheezes  On 3L NC O2  Decreased distant breath sounds b/l   Abdominal: Soft  Bowel sounds are normal  There is no tenderness  Neurological: He is alert and oriented to person, place, and time  Skin: Skin is warm and dry  He is not diaphoretic  Psychiatric: His behavior is normal  Judgment and thought content normal    Vitals reviewed  Additional Data:     Labs:      Results from last 7 days  Lab Units 07/18/17 0505 07/16/17 2056   WBC Thousand/uL 16 57*  < > 14 39*   HEMOGLOBIN g/dL 12 3  < > 13 4   HEMATOCRIT % 38 2  < > 41 1   PLATELETS Thousands/uL 161  < > 168   NEUTROS PCT %  --   --  82*   LYMPHS PCT %  --   --  11*   MONOS PCT %  --   --  6   EOS PCT %  --   --  1   < > = values in this interval not displayed  Results from last 7 days  Lab Units 07/18/17 0505 07/16/17 2056   SODIUM mmol/L 136  < > 138   POTASSIUM mmol/L 4 7  < > 4 3   CHLORIDE mmol/L 99*  < > 99*   CO2 mmol/L 31  < > 34*   BUN mg/dL 26*  < > 18   CREATININE mg/dL 1 26  < > 1 24   CALCIUM mg/dL 8 2*  < > 8 6   TOTAL PROTEIN g/dL  --   --  7 5   BILIRUBIN TOTAL mg/dL  --   --  0 24   ALK PHOS U/L  --   --  81   ALT U/L  --   --  71   AST U/L  --   --  67*   GLUCOSE RANDOM mg/dL 213*  < > 257*   < > = values in this interval not displayed  * I Have Reviewed All Lab Data Listed Above  * Additional Pertinent Lab Tests Reviewed:  All Labs Within Last 24 Hours Reviewed    Imaging:    Imaging Reports Reviewed Today Include: CXR, CT chest from 12/14/16  Imaging Personally Reviewed by Myself Includes:  none    Recent Cultures (last 7 days):           Last 24 Hours Medication List:     cyclobenzaprine 10 mg Oral Daily   docusate sodium 100 mg Oral BID   enoxaparin 40 mg Subcutaneous Daily   fish oil 1,000 mg Oral Daily   guaiFENesin 600 mg Oral Q12H RDAKE   ipratropium 0 5 mg Nebulization TID   levalbuterol 1 25 mg Nebulization TID   levofloxacin 500 mg Oral Daily   lisinopril 40 mg Oral Daily   methylPREDNISolone sodium succinate 40 mg Intravenous Q8H   metoprolol tartrate 25 mg Oral Daily   pravastatin 80 mg Oral Daily With Dinner   senna 1 tablet Oral Daily        Today, Patient Was Seen By: Claudio Porter PA-C    ** Please Note: Dragon 360 Dictation voice to text software may have been used in the creation of this document   **

## 2017-07-18 NOTE — PROGRESS NOTES
Progress Note - Pulmonary   Tiara Black Sr  73 y o  male MRN: [id number]  Unit/Bed#: -01 Encounter: [id number]    Assessment:  Tiara Black Sr  is a 73 y  o  male with history of obesity, Dm2, CAD s/p cath, COPD on 2L oxygen via nc at home, pulmonary hypertension and cor pulmonale who presented to the emergency room with worsening dyspnea, wheezes and nonproductive cough  1   Acute on chronic hypoxic respiratory failure, possible chronic hypercapnic RF  2  Mild exacerbation of COPD  3  Possible OHS, still feel has JACQUELINE based on REM AHI of 60 although overall was just 2 7  4  Possible R sided heart failure or diastolic dysfunction  5  Pulmonary nodule - 4 mm RLL    Plan:  1  Acute on chronic hypoxic respiratory failure, possible chronic hypercapnic RF   1  Continue Solumedrol 40mg IV q 8, quick taper   2  Continue bronchodilators with xopenex and atrovent every 6 hrs   3  ABG reviewed, pH  7 357 with pCO2 of 44 2  2  Mild exacerbation of COPD   1  Symptomatically improved, sat-ing in the mid 90's on 2L oxygen via nc  3   Possible OHS, still feel has JACQUELINE based on REM AHI of 60 although overall was just 2 7   1  Will f/u with outpatient sleep study  2   Will need overnight trending pulse oximetry on home oxygen of 2L prior to discharge to qualify for BiPAP  4   Possible R sided heart failure or diastolic dysfunction   1  BNP of 438, Light diureses with 20mg furosemide Iv  Borderline MARANDA present yesterday has completely resolved  Creatinine has returned to baseline of 1 2     5   Pulmonary nodule - 4 mm RLL- Stable to be followed up as an outpatient with CT scan  Chief Complaint: SOB    Subjective:   Tiara Black Sr  is a 73 y  o  male with history of obesity, Dm2, CAD s/p cath, COPD on 2L oxygen via nc at home, pulmonary hypertension and cor pulmonale who presented to the emergency room with worsening dyspnea, wheezes and nonproductive cough   Was started on  IV Solu-Medrol , nebulizer treatments and magnesium in the Ed  Symptomatically improved yesterday  Interval history:  Doing well  Seated comfortably at the edge of the bed, eating breakfast  No acute events overnight  Was transitioned from sliding scale to insulin GTT while on steroids to improve glycemic control  States that he is doing well and no longer felling SOB  Tolerating his diet, and able to meet his own ADLs  States he has been ambulating to the bathroom, but has not walked any further than that due to "being hooked up to all these damn tubes " denies any new SOB, N/V/D/CP/Difficulty breathing  Affirms that he still has a mild non productive cough  Objective:     Vitals: Blood pressure 131/62, pulse 89, temperature 99 6 °F (37 6 °C), temperature source Oral, resp  rate 18, height 6' (1 829 m), weight 127 kg (280 lb), SpO2 96 %  ,Body mass index is 37 97 kg/m²        Intake/Output Summary (Last 24 hours) at 07/18/17 0740  Last data filed at 07/18/17 0708   Gross per 24 hour   Intake          1297 13 ml   Output             2825 ml   Net         -1527 87 ml       Invasive Devices     Peripheral Intravenous Line            Peripheral IV 07/16/17 Right Antecubital 1 day                Physical Exam: /62   Pulse 89   Temp 99 6 °F (37 6 °C) (Oral)   Resp 18   Ht 6' (1 829 m)   Wt 127 kg (280 lb)   SpO2 96%   BMI 37 97 kg/m²     General Appearance:    Alert, cooperative, no distress, appears stated age   Head:    Normocephalic, without obvious abnormality, atraumatic   Eyes:    PERRL, conjunctiva/corneas clear,            Nose:   Nares normal, septum midline, mucosa normal, no drainage     Throat:   Lips, mucosa, and tongue normal; teeth and gums normal           Lungs:     Clear to auscultation bilaterally, decreased breath sounds, no wheezing, minimal crackles on R posterior basilar, respirations unlabored, no accessory muscle of respiration recruitment   Chest wall:    No tenderness or deformity   Heart: Regular rate and rhythm, S1 and S2 normal, no murmur, rub   or gallop, non displaced PMI   Abdomen:     Soft, non-tender, bowel sounds active all four quadrants,     no masses, no organomegaly           Extremities:   Extremities normal, atraumatic, no cyanosis, mild +1 pitting edema to the level of the mid shin   Pulses:   2+ and symmetric all extremities   Skin:   Skin color, texture, turgor normal, pretibial venous stasis dermatitis present b/l   Lymph nodes:   Cervical, supraclavicular, and axillary nodes normal   Neurologic:   CNII-XII grossly intact  Labs:   pH, Arterial  7 357                pCO2, Arterial  44 2                pO2, Arterial  104 6                HCO3, Arterial  24 2                    BNP:    Ref   Range 7/18/2017 05:05   NT-proBNP Latest Ref Range: <125 pg/mL 438 (H)      CBC:   Lab Results   Component Value Date    WBC 16 57 (H) 07/18/2017    HGB 12 3 07/18/2017    HCT 38 2 07/18/2017    MCV 83 07/18/2017     07/18/2017    MCH 26 9 07/18/2017    MCHC 32 2 07/18/2017    RDW 14 8 07/18/2017    MPV 10 4 07/18/2017   CMP:   Lab Results   Component Value Date     07/18/2017    K 4 7 07/18/2017    CL 99 (L) 07/18/2017    CO2 31 07/18/2017    ANIONGAP 6 07/18/2017    BUN 26 (H) 07/18/2017    CREATININE 1 26 07/18/2017    GLUCOSE 213 (H) 07/18/2017    CALCIUM 8 2 (L) 07/18/2017    EGFR 56 7 07/18/2017     Imaging and other studies: I have personally reviewed pertinent films in PACS

## 2017-07-18 NOTE — CONSULTS
Consultation - Adrian Mckeon  73 y o  male MRN: [id number]    Unit/Bed#: -01 Encounter: [id number]      Assessment/Plan     Assessment: This is a 73y o -year-old male with type 2 diabetes with hyperglycemia on long-term insulin therapy, COPD exacerbation  and steroid-induced hyperglycemia    Plan: We will continue IV insulin infusion for another 24 hours, also start Humalog 16 units before meals  Fingersticks need to be monitored every 2 hours when he is on IV insulin infusion  As steroids toes is decreased will consider switching back to Novolin 70/30 insulin   COPD exacerbation- pulmonary on board-steroids will be tapered tomorrow  CC: Diabetes Consult    History of Present Illness     HPI: Christina Canales Sr  is a 73y o  year old male with type  2 diabetes , long-term insulin therapy who presented to the hospital with complaints of worsening shortness of breath ,cough and wheezing -his been started on IV steroids resulting in severe hyperglycemia-he was started on IV insulin infusion yesterday however fingersticks is still above 300  Endocrine consult is requested for management of diabetes  Patient states that he has had type 2 diabetes for the past 10 years, initially treated with oral hypoglycemics however has been on insulin therapy for the past few years  He was switched to U500 regular insulin last year with improvement in his sugars however he was switched to Novolin 70/30 last month as he is not able to afford U500 regular insulin  He states that at home he was taking Novolin 70/30 55 units before breakfast and lunch and 45 before dinner  He states that his sugars were ranging between 100 to 300s, he denies any hypoglycemic episodes  Complains of occasional polyuria, polydipsia and blurry vision  Complains of numbness and tingling in his feet  Currently feeling better as far as his breathing is concerned    Good appetite, denies any nausea or vomiting  Consults    Review of Systems   Constitutional: Positive for activity change and fatigue  Negative for appetite change, fever and unexpected weight change  Eyes: Negative for visual disturbance  Respiratory: Positive for cough, shortness of breath and wheezing  Cardiovascular: Positive for leg swelling  Negative for chest pain and palpitations  Gastrointestinal: Negative for abdominal distention, abdominal pain, constipation, diarrhea, nausea and vomiting  Endocrine: Positive for polyuria  Psychiatric/Behavioral: Negative for agitation, behavioral problems and confusion  All other systems reviewed and are negative        Historical Information   Past Medical History:   Diagnosis Date    Cardiac disease     COPD (chronic obstructive pulmonary disease)     Coronary artery disease     Diabetes mellitus     Hyperlipidemia     Hypertension     MI (myocardial infarction)     with 3 stents    Prostate cancer      Past Surgical History:   Procedure Laterality Date    ABDOMINAL SURGERY      exploratory    PROSTATE SURGERY       Social History   History   Alcohol Use No     History   Drug Use No     History   Smoking Status    Former Smoker    Packs/day: 1 00   Smokeless Tobacco    Never Used     Comment: taking the patch currently     Family History:   Family History   Problem Relation Age of Onset    Heart disease Father        Meds/Allergies   Current Facility-Administered Medications   Medication Dose Route Frequency Provider Last Rate Last Dose    acetaminophen (TYLENOL) tablet 650 mg  650 mg Oral Q6H PRN Kenneth Quinones MD        cyclobenzaprine (FLEXERIL) tablet 10 mg  10 mg Oral Daily Kenneth Quinones MD   10 mg at 07/18/17 0810    docusate sodium (COLACE) capsule 100 mg  100 mg Oral BID Kenneth Quinones MD   100 mg at 07/18/17 0810    fish oil capsule 1,000 mg  1,000 mg Oral Daily Kenneth Quinones MD   1,000 mg at 07/18/17 0812    guaiFENesin (MUCINEX) 12 hr tablet 600 mg  600 mg Oral Q12H [address] Usama Benson MD   600 mg at 07/18/17 0810    [START ON 7/19/2017] heparin (porcine) subcutaneous injection 5,000 Units  5,000 Units Subcutaneous [facility] Doris Angulo PA-C        hydrALAZINE (APRESOLINE) injection 5 mg  5 mg Intravenous Q6H PRN Usama Benson MD        insulin lispro (HumaLOG) 100 units/mL subcutaneous injection 16 Units  16 Units Subcutaneous TID With Meals Zulema Samuel MD        insulin regular (HumuLIN R,NovoLIN R) 1 Units/mL in sodium chloride 0 9 % 100 mL infusion  0 3-21 Units/hr Intravenous Titrated Doris Angulo PA-C 17 mL/hr at 07/18/17 1205 17 Units/hr at 07/18/17 1205    ipratropium (ATROVENT) 0 02 % inhalation solution 0 5 mg  0 5 mg Nebulization TID Michelle Ching MD   0 5 mg at 07/18/17 1350    ipratropium (ATROVENT) 0 02 % inhalation solution 0 5 mg  0 5 mg Nebulization Q6H PRN Michelle Ching MD        levalbuterol [facility] Forget) inhalation solution 1 25 mg  1 25 mg Nebulization TID Usama Benson MD   1 25 mg at 07/18/17 1351    levalbuterol (XOPENEX) inhalation solution 1 25 mg  1 25 mg Nebulization Q6H PRN Michelle Ching MD        levofloxacin [facility]) tablet 500 mg  500 mg Oral Daily Usama Benson MD   500 mg at 07/18/17 0813    methylPREDNISolone sodium succinate (Solu-MEDROL) injection 40 mg  40 mg Intravenous Q8H Usama Benson MD   40 mg at 07/18/17 0810    metoprolol tartrate (LOPRESSOR) tablet 25 mg  25 mg Oral Daily Usama Benson MD   25 mg at 07/18/17 0810    ondansetron (ZOFRAN) injection 4 mg  4 mg Intravenous Q6H PRN Usama Benson MD        oxyCODONE-acetaminophen (PERCOCET) 5-325 mg per tablet 1 tablet  1 tablet Oral Q6H PRN Usama Benson MD   1 tablet at 07/18/17 1051    polyethylene glycol (MIRALAX) packet 17 g  17 g Oral Daily PRN Usama Benson MD        pravastatin (PRAVACHOL) tablet 80 mg  80 mg Oral Daily With Mika Rivas MD   80 mg at 07/17/17 8935    senna (SENOKOT) tablet 8 6 mg  1 tablet Oral Daily Maryjo Montoya MD   8 6 mg at 07/18/17 0810     Allergies   Allergen Reactions    Metformin        Objective   Vitals: Blood pressure 129/61, pulse 80, temperature 98 3 °F (36 8 °C), temperature source Oral, resp  rate 19, height 6' (1 829 m), weight 127 kg (280 lb), SpO2 94 %  Intake/Output Summary (Last 24 hours) at 07/18/17 1425  Last data filed at 07/18/17 1149   Gross per 24 hour   Intake          1430 25 ml   Output             2725 ml   Net         -1294 75 ml     Invasive Devices     Peripheral Intravenous Line            Peripheral IV 07/16/17 Right Antecubital 1 day                Physical Exam   Constitutional: He is oriented to person, place, and time  He appears well-developed and well-nourished  No distress  HENT:   Head: Normocephalic and atraumatic  Eyes: Conjunctivae and EOM are normal  No scleral icterus  Neck: Normal range of motion  Neck supple  No thyromegaly present  Cardiovascular: Normal rate, regular rhythm and normal heart sounds  No murmur heard  Pulmonary/Chest: Effort normal and breath sounds normal  No respiratory distress  He has no wheezes  He has no rales  Abdominal: Soft  Bowel sounds are normal  He exhibits no distension  There is no tenderness  There is no rebound  Musculoskeletal: Normal range of motion  He exhibits no edema or deformity  Lymphadenopathy:     He has no cervical adenopathy  Neurological: He is alert and oriented to person, place, and time  Skin: Skin is warm and dry  No rash noted  No erythema  No pallor  Psychiatric: He has a normal mood and affect  His behavior is normal  Thought content normal        The history was obtained from the review of the chart, patient      Lab Results:     Results from last 7 days  Lab Units 07/17/17  0426   HEMOGLOBIN A1C % 8 8*     Lab Results   Component Value Date    WBC 16 57 (H) 07/18/2017    HGB 12 3 07/18/2017    HCT 38 2 07/18/2017    MCV 83 07/18/2017     07/18/2017     Lab Results   Component Value Date/Time    BUN 26 (H) 07/18/2017 05:05 AM    BUN 17 09/09/2015 05:30 AM     07/18/2017 05:05 AM     09/09/2015 05:30 AM    K 4 7 07/18/2017 05:05 AM    K 4 2 09/09/2015 05:30 AM    CL 99 (L) 07/18/2017 05:05 AM     09/09/2015 05:30 AM    CO2 31 07/18/2017 05:05 AM    CO2 35 (H) 09/09/2015 05:30 AM    CREATININE 1 26 07/18/2017 05:05 AM    CREATININE 1 03 09/09/2015 05:30 AM    AST 67 (H) 07/16/2017 08:56 PM    AST 19 09/08/2015 02:00 PM    ALT 71 07/16/2017 08:56 PM    ALT 27 09/08/2015 02:00 PM       POC Glucose (mg/dl)   Date Value   07/18/2017 309 (H)   07/18/2017 322 (H)   07/18/2017 323 (H)   07/18/2017 156 (H)   07/18/2017 216 (H)   07/18/2017 243 (H)   07/18/2017 249 (H)   07/18/2017 274 (H)   07/17/2017 295 (H)   07/17/2017 417 (H)       Imaging Studies: I have personally reviewed pertinent reports  XR chest 1 view portable [[id number]] Collected: 07/17/17 0951   Order Status: Completed Updated: 07/17/17 0953   Narrative:     CHEST     INDICATION:  Chest and back pain   Shortness of breath  COMPARISON:  November 27, 2016  VIEWS:   AP frontal    IMAGES:  2    FINDINGS:  Examination is somewhat limited due to rotation and because costophrenic angles are excluded from the inferior margins of this film  Cardiomediastinal silhouette appears unremarkable  The lungs are clear   No pneumothorax or pleural effusion  Visualized osseous structures appear within normal limits for the patient's age  Impression:       No active pulmonary disease on slightly limited examination       Portions of the record may have been created with voice recognition software  Occasional wrong word or "sound a like" substitutions may have occurred due to the inherent limitations of voice recognition software  Read the chart carefully and recognize, using context, where substitutions have occurred

## 2017-07-18 NOTE — SOCIAL WORK
Patient reports living with his spouse Kingsley Bowlign in a 2 story home with 0STE  IPTA  Drives  DME includes wc, scooter, cane, and PRN home O2  Preferred pharmacy [facility]  No hx VNA or STR  No living will or POA  No hx MH or D+A treatment  Patient educated on the importance of understanding their medical course and encouraged to ask questions of the medical team  [address] discussed  Patient's spouse available to assist around the home  Emergency Contact: [phone]    CM continues to follow

## 2017-07-18 NOTE — PLAN OF CARE

## 2017-07-19 LAB
ANION GAP SERPL CALCULATED.3IONS-SCNC: 7 MMOL/L (ref 4–13)
BUN SERPL-MCNC: 30 MG/DL (ref 5–25)
CALCIUM SERPL-MCNC: 8.4 MG/DL (ref 8.3–10.1)
CHLORIDE SERPL-SCNC: 102 MMOL/L (ref 100–108)
CO2 SERPL-SCNC: 26 MMOL/L (ref 21–32)
CREAT SERPL-MCNC: 1.25 MG/DL (ref 0.6–1.3)
GFR SERPL CREATININE-BSD FRML MDRD: 57.3 ML/MIN/1.73SQ M
GLUCOSE SERPL-MCNC: 125 MG/DL (ref 65–140)
GLUCOSE SERPL-MCNC: 154 MG/DL (ref 65–140)
GLUCOSE SERPL-MCNC: 166 MG/DL (ref 65–140)
GLUCOSE SERPL-MCNC: 168 MG/DL (ref 65–140)
GLUCOSE SERPL-MCNC: 175 MG/DL (ref 65–140)
GLUCOSE SERPL-MCNC: 178 MG/DL (ref 65–140)
GLUCOSE SERPL-MCNC: 181 MG/DL (ref 65–140)
GLUCOSE SERPL-MCNC: 181 MG/DL (ref 65–140)
GLUCOSE SERPL-MCNC: 189 MG/DL (ref 65–140)
GLUCOSE SERPL-MCNC: 191 MG/DL (ref 65–140)
GLUCOSE SERPL-MCNC: 194 MG/DL (ref 65–140)
GLUCOSE SERPL-MCNC: 198 MG/DL (ref 65–140)
GLUCOSE SERPL-MCNC: 203 MG/DL (ref 65–140)
GLUCOSE SERPL-MCNC: 203 MG/DL (ref 65–140)
GLUCOSE SERPL-MCNC: 268 MG/DL (ref 65–140)
POTASSIUM SERPL-SCNC: 5.2 MMOL/L (ref 3.5–5.3)
SODIUM SERPL-SCNC: 135 MMOL/L (ref 136–145)

## 2017-07-19 PROCEDURE — 80048 BASIC METABOLIC PNL TOTAL CA: CPT | Performed by: PHYSICIAN ASSISTANT

## 2017-07-19 PROCEDURE — 94760 N-INVAS EAR/PLS OXIMETRY 1: CPT

## 2017-07-19 PROCEDURE — 82948 REAGENT STRIP/BLOOD GLUCOSE: CPT

## 2017-07-19 PROCEDURE — 94640 AIRWAY INHALATION TREATMENT: CPT

## 2017-07-19 RX ORDER — CYCLOBENZAPRINE HCL 10 MG
10 TABLET ORAL 2 TIMES DAILY
Status: DISCONTINUED | OUTPATIENT
Start: 2017-07-19 | End: 2017-07-21 | Stop reason: HOSPADM

## 2017-07-19 RX ORDER — METHYLPREDNISOLONE 4 MG/1
12 TABLET ORAL DAILY
Status: DISCONTINUED | OUTPATIENT
Start: 2017-07-23 | End: 2017-07-21 | Stop reason: HOSPADM

## 2017-07-19 RX ORDER — INSULIN ASPART 100 [IU]/ML
60 INJECTION, SUSPENSION SUBCUTANEOUS
Status: DISCONTINUED | OUTPATIENT
Start: 2017-07-19 | End: 2017-07-21 | Stop reason: HOSPADM

## 2017-07-19 RX ORDER — METHYLPREDNISOLONE 4 MG/1
8 TABLET ORAL DAILY
Status: DISCONTINUED | OUTPATIENT
Start: 2017-07-24 | End: 2017-07-21 | Stop reason: HOSPADM

## 2017-07-19 RX ORDER — METHYLPREDNISOLONE 16 MG/1
16 TABLET ORAL DAILY
Status: DISCONTINUED | OUTPATIENT
Start: 2017-07-22 | End: 2017-07-21 | Stop reason: HOSPADM

## 2017-07-19 RX ORDER — INSULIN ASPART 100 [IU]/ML
60 INJECTION, SUSPENSION SUBCUTANEOUS
Status: DISCONTINUED | OUTPATIENT
Start: 2017-07-20 | End: 2017-07-21 | Stop reason: HOSPADM

## 2017-07-19 RX ORDER — METHYLPREDNISOLONE 4 MG/1
4 TABLET ORAL DAILY
Status: DISCONTINUED | OUTPATIENT
Start: 2017-07-25 | End: 2017-07-21 | Stop reason: HOSPADM

## 2017-07-19 RX ADMIN — HEPARIN SODIUM 5000 UNITS: 5000 INJECTION, SOLUTION INTRAVENOUS; SUBCUTANEOUS at 14:17

## 2017-07-19 RX ADMIN — GUAIFENESIN 600 MG: 600 TABLET, EXTENDED RELEASE ORAL at 08:40

## 2017-07-19 RX ADMIN — DOCUSATE SODIUM 100 MG: 100 CAPSULE, LIQUID FILLED ORAL at 08:38

## 2017-07-19 RX ADMIN — OXYCODONE HYDROCHLORIDE AND ACETAMINOPHEN 1 TABLET: 5; 325 TABLET ORAL at 22:22

## 2017-07-19 RX ADMIN — OXYCODONE HYDROCHLORIDE AND ACETAMINOPHEN 1 TABLET: 5; 325 TABLET ORAL at 08:44

## 2017-07-19 RX ADMIN — HEPARIN SODIUM 5000 UNITS: 5000 INJECTION, SOLUTION INTRAVENOUS; SUBCUTANEOUS at 05:52

## 2017-07-19 RX ADMIN — LEVOFLOXACIN 500 MG: 500 TABLET, FILM COATED ORAL at 08:41

## 2017-07-19 RX ADMIN — METOPROLOL TARTRATE 25 MG: 25 TABLET ORAL at 08:38

## 2017-07-19 RX ADMIN — INSULIN LISPRO 2 UNITS: 100 INJECTION, SOLUTION INTRAVENOUS; SUBCUTANEOUS at 22:35

## 2017-07-19 RX ADMIN — SODIUM CHLORIDE 8 UNITS/HR: 9 INJECTION, SOLUTION INTRAVENOUS at 01:57

## 2017-07-19 RX ADMIN — CYCLOBENZAPRINE HYDROCHLORIDE 10 MG: 10 TABLET, FILM COATED ORAL at 22:38

## 2017-07-19 RX ADMIN — Medication 1000 MG: at 08:40

## 2017-07-19 RX ADMIN — SODIUM CHLORIDE 12 UNITS/HR: 9 INJECTION, SOLUTION INTRAVENOUS at 11:53

## 2017-07-19 RX ADMIN — INSULIN HUMAN 25 UNITS: 100 INJECTION, SUSPENSION SUBCUTANEOUS at 22:33

## 2017-07-19 RX ADMIN — METHYLPREDNISOLONE SODIUM SUCCINATE 40 MG: 40 INJECTION, POWDER, FOR SOLUTION INTRAMUSCULAR; INTRAVENOUS at 08:41

## 2017-07-19 RX ADMIN — LEVALBUTEROL HYDROCHLORIDE 1.25 MG: 1.25 SOLUTION, CONCENTRATE RESPIRATORY (INHALATION) at 07:14

## 2017-07-19 RX ADMIN — INSULIN ASPART 60 UNITS: 100 INJECTION, SUSPENSION SUBCUTANEOUS at 16:42

## 2017-07-19 RX ADMIN — SENNOSIDES 8.6 MG: 8.6 TABLET, FILM COATED ORAL at 08:38

## 2017-07-19 RX ADMIN — INSULIN LISPRO 16 UNITS: 100 INJECTION, SOLUTION INTRAVENOUS; SUBCUTANEOUS at 11:35

## 2017-07-19 RX ADMIN — INSULIN LISPRO 16 UNITS: 100 INJECTION, SOLUTION INTRAVENOUS; SUBCUTANEOUS at 06:40

## 2017-07-19 RX ADMIN — PRAVASTATIN SODIUM 80 MG: 80 TABLET ORAL at 16:42

## 2017-07-19 RX ADMIN — HEPARIN SODIUM 5000 UNITS: 5000 INJECTION, SOLUTION INTRAVENOUS; SUBCUTANEOUS at 22:23

## 2017-07-19 RX ADMIN — LEVALBUTEROL HYDROCHLORIDE 1.25 MG: 1.25 SOLUTION, CONCENTRATE RESPIRATORY (INHALATION) at 13:36

## 2017-07-19 RX ADMIN — IPRATROPIUM BROMIDE 0.5 MG: 0.5 SOLUTION RESPIRATORY (INHALATION) at 13:36

## 2017-07-19 RX ADMIN — CYCLOBENZAPRINE HYDROCHLORIDE 10 MG: 10 TABLET, FILM COATED ORAL at 08:38

## 2017-07-19 RX ADMIN — LEVALBUTEROL HYDROCHLORIDE 1.25 MG: 1.25 SOLUTION, CONCENTRATE RESPIRATORY (INHALATION) at 20:00

## 2017-07-19 RX ADMIN — METHYLPREDNISOLONE SODIUM SUCCINATE 40 MG: 40 INJECTION, POWDER, FOR SOLUTION INTRAMUSCULAR; INTRAVENOUS at 16:42

## 2017-07-19 RX ADMIN — IPRATROPIUM BROMIDE 0.5 MG: 0.5 SOLUTION RESPIRATORY (INHALATION) at 20:00

## 2017-07-19 RX ADMIN — IPRATROPIUM BROMIDE 0.5 MG: 0.5 SOLUTION RESPIRATORY (INHALATION) at 07:15

## 2017-07-19 RX ADMIN — GUAIFENESIN 600 MG: 600 TABLET, EXTENDED RELEASE ORAL at 22:23

## 2017-07-19 NOTE — PROGRESS NOTES
Progress Note - Alysha Amador Sr  73 y o  male MRN: [id number]    Unit/Bed#: -01 Encounter: [id number]      CC: diabetes f/u    Subjective:   Alysha Amador Sr  is a 73y o  year old male with type 2  diabetes  Feels better ,  No complaints  No hypoglycemia  Objective:     Vitals: Blood pressure 159/91, pulse 68, temperature 97 7 °F (36 5 °C), temperature source Oral, resp  rate 20, height 6' (1 829 m), weight 127 kg (280 lb), SpO2 96 %  ,Body mass index is 37 97 kg/m²  Intake/Output Summary (Last 24 hours) at 07/19/17 1129  Last data filed at 07/19/17 0455   Gross per 24 hour   Intake             1080 ml   Output             2675 ml   Net            -1595 ml       Physical Exam:  General Appearance: awake, appears stated age and cooperative  Head: Normocephalic, without obvious abnormality, atraumatic  Extremities: moves all extremities  Skin: Skin color and temperature normal    Pulm: no labored breathing    Lab, Imaging and other studies: I have personally reviewed pertinent reports  Results from last 7 days  Lab Units 07/19/17  0525   SODIUM mmol/L 135*   POTASSIUM mmol/L 5 2   CHLORIDE mmol/L 102   CO2 mmol/L 26   BUN mg/dL 30*   CREATININE mg/dL 1 25   GLUCOSE RANDOM mg/dL 166*   CALCIUM mg/dL 8 4       POC Glucose (mg/dl)   Date Value   07/19/2017 194 (H)   07/19/2017 191 (H)   07/19/2017 198 (H)   07/19/2017 181 (H)   07/19/2017 178 (H)   07/19/2017 175 (H)   07/18/2017 268 (H)   07/18/2017 348 (H)   07/18/2017 282 (H)   07/18/2017 252 (H)       Assessment:  Type 2 diabetes with hyperglycemia and long-term insulin therapy  COPD exacerbation  Steroid-induced hyperglycemia    Plan:  Sugars improved ,  not clear if steroids are being tapered today- will switch to subcutaneous insulin therapy starting with dinner today    NovoLog Mix 70/30 60 units before meals, NPH 25 units at bedtime  Will monitor blood sugars before meals and bedtime and adjust accordingly      Portions of the record may have been created with voice recognition software  Occasional wrong word or "sound a like" substitutions may have occurred due to the inherent limitations of voice recognition software  Read the chart carefully and recognize, using context, where substitutions have occurred

## 2017-07-19 NOTE — PROGRESS NOTES
[facility] Progress Note  Patient: Murtaza Lawrence Sr  73 y o  male   MRN: [id number]  PCP: Sarabjit Zamorano MD  Unit/Bed#: -Gin Encounter: [id number]  Date Of Visit: 07/19/17    Assessment:    Principal Problem:    COPD exacerbation  Active Problems:    Coronary artery disease involving native coronary artery without angina pectoris    Obesity (BMI 30-39  9)    Dyslipidemia    Diabetic polyneuropathy associated with type 2 diabetes mellitus    Venous stasis dermatitis of both lower extremities    Opioid dependence    Leukocytosis      Plan:    · COPD with exacerbation  · Pulm following, discussed with Dr Ashley Sawyer today  · Patient still on IV steroids with good symptomatic improvement; anticipate taper today   · Xopenex and atrovent Q6hr   · Continue Mucinex  · Acute on chronic hypoxic respiratory failure  · Patient continuing to require supplemental O2  Wean as able   · Ambulatory pulse ox prior to d/c  · Outpatient sleep study  · Overnight pulse ox monitoring   · RLL pulmonary nodule, 4mm  · Outpatient follow up  · Leukocytosis  · Suspect steroid-induced   · MARANAD  · Suspect was 2/2 dehydration in setting of hyperglycemia  · Type 2 DM with long term insulin use and hyperglycemia  · Endocrine following; insulin per their recommendations  · HTN  · BP stable  Lisinopril on hold given renal function  Rpeat BMP in AM  If creatinine remains stable consider re-starting   · Elevated BNP  ·   · Suspect SOB is 2/2 COPD exacerbation, and not CHF at this time  Patient with stable LE edema, no pleural effusions on CXR, and symptomatically improving with nabs and IV steroids  Discussed with attending pulmonologist,   No diuresis at this time  · HLD  · Continue statin  · Obesity  · Encourage Diet, exercise, weight loss       VTE Pharmacologic Prophylaxis:   Pharmacologic: Heparin  Mechanical VTE Prophylaxis in Place: Yes    Patient Centered Rounds: I have performed bedside rounds with nursing staff today     Discussions with Specialists or Other Care Team Provider: Discussed BNP/diuresis with Attending pulmonologist  Given patient's symptomatic improvement on COPD treatment thus far, no diuresis at this time     Education and Discussions with Family / Patient: Patient and wife    Time Spent for Care: 30 minutes  More than 50% of total time spent on counseling and coordination of care as described above  Current Length of Stay: 2 day(s)    Current Patient Status: Inpatient   Certification Statement: The patient will continue to require additional inpatient hospital stay due to requiring IV steroids and still on insulin GTT    Discharge Plan: Pending the above  Would anticipate home     Code Status: Level 1 - Full Code      Subjective:   Mr Lydia Wall reports that his SOB is improving and mucinex is helping his cough  He reports that his legs are no more swollen than usual  He is frustrated that he is still on insulin GTT because he would like to shower  He denies CP or abdominal pain    Objective:     Vitals:   Temp (24hrs), Av 1 °F (36 7 °C), Min:97 4 °F (36 3 °C), Max:99 2 °F (37 3 °C)    HR:  [] 68  Resp:  [19-20] 20  BP: (122-159)/(57-91) 159/91  SpO2:  [80 %-97 %] 96 %  Body mass index is 37 97 kg/m²  Input and Output Summary (last 24 hours): Intake/Output Summary (Last 24 hours) at 17 1158  Last data filed at 17 0455   Gross per 24 hour   Intake              600 ml   Output             2175 ml   Net            -1575 ml       Physical Exam:     Physical Exam   Constitutional: He is oriented to person, place, and time  No distress  Obese male seen sitting at bedside  Nursing present  He is cooperative   HENT:   Mouth/Throat: Oropharynx is clear and moist    Eyes: Pupils are equal, round, and reactive to light  Cardiovascular: Normal rate and regular rhythm  Distant heart sounds   Pulmonary/Chest: No respiratory distress  He has no wheezes  He exhibits no tenderness  On supplemental NC O2  Distant lung sounds   Abdominal: Soft  Bowel sounds are normal    Musculoskeletal: He exhibits edema (b/l LE, unchanged from baseline per patient)  He exhibits no tenderness  Neurological: He is alert and oriented to person, place, and time  Skin: Skin is warm  He is not diaphoretic  Vitals reviewed  Additional Data:     Labs:      Results from last 7 days  Lab Units 07/18/17  0505  07/16/17 2056   WBC Thousand/uL 16 57*  < > 14 39*   HEMOGLOBIN g/dL 12 3  < > 13 4   HEMATOCRIT % 38 2  < > 41 1   PLATELETS Thousands/uL 161  < > 168   NEUTROS PCT %  --   --  82*   LYMPHS PCT %  --   --  11*   MONOS PCT %  --   --  6   EOS PCT %  --   --  1   < > = values in this interval not displayed  Results from last 7 days  Lab Units 07/19/17  0525  07/16/17 2056   SODIUM mmol/L 135*  < > 138   POTASSIUM mmol/L 5 2  < > 4 3   CHLORIDE mmol/L 102  < > 99*   CO2 mmol/L 26  < > 34*   BUN mg/dL 30*  < > 18   CREATININE mg/dL 1 25  < > 1 24   CALCIUM mg/dL 8 4  < > 8 6   TOTAL PROTEIN g/dL  --   --  7 5   BILIRUBIN TOTAL mg/dL  --   --  0 24   ALK PHOS U/L  --   --  81   ALT U/L  --   --  71   AST U/L  --   --  67*   GLUCOSE RANDOM mg/dL 166*  < > 257*   < > = values in this interval not displayed  * I Have Reviewed All Lab Data Listed Above  * Additional Pertinent Lab Tests Reviewed:  María 66 Admission Reviewed    Imaging:    Imaging Reports Reviewed Today Include: CXR - discussed lack of pleural effusions with pulm attending   Imaging Personally Reviewed by Myself Includes:  none    Recent Cultures (last 7 days):           Last 24 Hours Medication List:     cyclobenzaprine 10 mg Oral Daily   docusate sodium 100 mg Oral BID   fish oil 1,000 mg Oral Daily   guaiFENesin 600 mg Oral Q12H [facility]   heparin (porcine) 5,000 Units Subcutaneous Q8H [facility]   [START ON 7/20/2017] insulin aspart protamine-insulin aspart 60 Units Subcutaneous BID before breakfast/lunch   insulin aspart protamine-insulin aspart 60 Units Subcutaneous Before Dinner   insulin lispro 2-12 Units Subcutaneous HS   [START ON 7/20/2017] insulin lispro 4-20 Units Subcutaneous TID With Meals   insulin NPH 25 Units Subcutaneous HS   ipratropium 0 5 mg Nebulization TID   levalbuterol 1 25 mg Nebulization TID   levofloxacin 500 mg Oral Daily   methylPREDNISolone sodium succinate 40 mg Intravenous Q8H   metoprolol tartrate 25 mg Oral Daily   pravastatin 80 mg Oral Daily With Dinner   senna 1 tablet Oral Daily        Today, Patient Was Seen By: Jun Dolan PA-C    ** Please Note: Dragon 360 Dictation voice to text software may have been used in the creation of this document   **

## 2017-07-19 NOTE — PROGRESS NOTES
Progress Note - Pulmonary   Amrita Treadwell Sr  73 y o  male MRN: [id number]  Unit/Bed#: -01 Encounter: [id number]    Assessment:  Amrita Treadwell Sr  is a 73 y  o  male with history of obesity, Dm2, CAD s/p cath, COPD on 2L oxygen via nc at home, pulmonary hypertension and cor pulmonale who presented to the emergency room with worsening dyspnea, wheezes and nonproductive cough      1   Acute on chronic hypoxic respiratory failure, possible chronic hypercapnic RF  2   Mild exacerbation of COPD  3   Possible OHS, still feel has JACQUELINE based on REM AHI of 60 although overall was just 2 7  4   Possible R sided heart failure or diastolic dysfunction  5   Pulmonary nodule - 4 mm RLL       Plan:  1   Acute on chronic hypoxic respiratory failure, possible chronic hypercapnic RF                       1   Continue Solumedrol 40mg IV q 8, quick taper                       2   Continue bronchodilators with xopenex and atrovent every 6 hrs                       3   ABG reviewed, pH  7 357 with pCO2 of 44 2  2   Mild exacerbation of COPD                       1   Symptomatically improved, sat-ing in the mid 90's on 2L oxygen via nc  2   Continue mucinex BID for mucous clearance, if no improvement advance to mucomyst bid x3days  3   Possible OHS, still feel has JACQUELINE based on REM AHI of 60 although overall was just 2 7                       1   Will f/u with outpatient sleep study  2   Will need overnight trending pulse oximetry on home oxygen of 2L prior to discharge to qualify for BiPAP               3  Should consider home sleep study pr Dr Ladarius Youssef  4   Possible R sided heart failure or diastolic dysfunction                       1   BNP of 438, Light diureses with 20mg furosemide Iv  Borderline MARANDA present yesterday has completely resolved  Creatinine has returned to baseline of 1 2     5   Pulmonary nodule              1  4 mm RLL- Stable to be followed up as an outpatient with CT scan  Chief Complaint:   SOB    Subjective:   Cleo Dela Cruz Sr  is a 73 y  o  male with history of obesity, Dm2, CAD s/p cath, COPD on 2L oxygen via nc at home, pulmonary hypertension and cor pulmonale who presented to the emergency room with worsening dyspnea, wheezes and nonproductive cough  Was started on  IV Solu-Medrol , nebulizer treatments and magnesium in the Ed  Symptomatically improved yesterday      Interval history:  Doing well  Seated comfortably at the edge of the bed, eating breakfast  No acute events overnight  Was transitioned from sliding scale to insulin GTT while on steroids to improve glycemic control, his sugars are much better 180's-190's  Endocrinology has begun the process of transitioning the patient back to his home insulin dosing  States that he is doing well and no longer felling SOB  Tolerating his diet, and able to meet his own ADLs  States he has been ambulating to the bathroom, but has not walked any further than that due to "being hooked up to all these damn tubes " denies any new SOB, N/V/D/CP/Difficulty breathing  Affirms that he still has a mild non productive cough, but it is improved today after the mucinex  Objective:     Vitals: Blood pressure 159/91, pulse (!) 241, temperature 97 7 °F (36 5 °C), temperature source Oral, resp  rate 20, height 6' (1 829 m), weight 127 kg (280 lb), SpO2 (!) 80 %  ,Body mass index is 37 97 kg/m²        Intake/Output Summary (Last 24 hours) at 07/19/17 0740  Last data filed at 07/19/17 0455   Gross per 24 hour   Intake          1111 62 ml   Output             2675 ml   Net         -1563 38 ml       Invasive Devices     Peripheral Intravenous Line            Peripheral IV 07/19/17 Right Forearm less than 1 day                Physical Exam: /91 Comment: [id number]  Pulse (!) 241   Temp 97 7 °F (36 5 °C) (Oral)   Resp 20   Ht 6' (1 829 m)   Wt 127 kg (280 lb)   SpO2 (!) 80%   BMI 37 97 kg/m²     General Appearance:    Alert, cooperative, no distress, appears stated age   Head:    Normocephalic, without obvious abnormality, atraumatic   Eyes:    PERRL,                        Lungs:     Clear to auscultation bilaterally, decreased breathe sounds respirations unlabored, no accessory muscle recruitment  Chest wall:    No tenderness or deformity   Heart:    Regular rate and rhythm, S1 and S2 normal, no murmur, rub   or gallop   Abdomen:     Soft, non-tender, bowel sounds active all four quadrants,     no masses, no organomegaly           Extremities:   Extremities normal, atraumatic, no cyanosis or edema       Skin:   Skin color, texture, turgor normal, pretibial venous stassis dermatisis present b/l       Neurologic:   CNII-XII grossly intact  Labs: I have personally reviewed pertinent lab results  , ABG:   Lab Results   Component Value Date    PHART 7 357 07/18/2017    MIB9KKS 44 2 (H) 07/18/2017    PO2ART 104 6 07/18/2017    RVV6BMT 24 2 07/18/2017    BEART -1 4 07/18/2017    SOURCE Radial, Left 07/18/2017   , BNP: No results found for: BNP, CBC: No results found for: WBC, HGB, HCT, MCV, PLT, ADJUSTEDWBC, MCH, MCHC, RDW, MPV, NRBC, CMP:   Lab Results   Component Value Date     (L) 07/19/2017    K 5 2 07/19/2017     07/19/2017    CO2 26 07/19/2017    ANIONGAP 7 07/19/2017    BUN 30 (H) 07/19/2017    CREATININE 1 25 07/19/2017    GLUCOSE 166 (H) 07/19/2017    CALCIUM 8 4 07/19/2017    EGFR 57 3 07/19/2017   , Imaging and other studies: I have personally reviewed pertinent reports

## 2017-07-20 LAB
ANION GAP SERPL CALCULATED.3IONS-SCNC: 6 MMOL/L (ref 4–13)
BUN SERPL-MCNC: 31 MG/DL (ref 5–25)
CALCIUM SERPL-MCNC: 8.2 MG/DL (ref 8.3–10.1)
CHLORIDE SERPL-SCNC: 101 MMOL/L (ref 100–108)
CO2 SERPL-SCNC: 31 MMOL/L (ref 21–32)
CREAT SERPL-MCNC: 1.18 MG/DL (ref 0.6–1.3)
ERYTHROCYTE [DISTWIDTH] IN BLOOD BY AUTOMATED COUNT: 15.3 % (ref 11.6–15.1)
GFR SERPL CREATININE-BSD FRML MDRD: >60 ML/MIN/1.73SQ M
GLUCOSE SERPL-MCNC: 132 MG/DL (ref 65–140)
GLUCOSE SERPL-MCNC: 144 MG/DL (ref 65–140)
GLUCOSE SERPL-MCNC: 204 MG/DL (ref 65–140)
GLUCOSE SERPL-MCNC: 267 MG/DL (ref 65–140)
GLUCOSE SERPL-MCNC: 294 MG/DL (ref 65–140)
GLUCOSE SERPL-MCNC: 312 MG/DL (ref 65–140)
HCT VFR BLD AUTO: 41.4 % (ref 36.5–49.3)
HGB BLD-MCNC: 13 G/DL (ref 12–17)
MCH RBC QN AUTO: 26.6 PG (ref 26.8–34.3)
MCHC RBC AUTO-ENTMCNC: 31.4 G/DL (ref 31.4–37.4)
MCV RBC AUTO: 85 FL (ref 82–98)
PLATELET # BLD AUTO: 207 THOUSANDS/UL (ref 149–390)
PMV BLD AUTO: 10.5 FL (ref 8.9–12.7)
POTASSIUM SERPL-SCNC: 4.3 MMOL/L (ref 3.5–5.3)
RBC # BLD AUTO: 4.88 MILLION/UL (ref 3.88–5.62)
SODIUM SERPL-SCNC: 138 MMOL/L (ref 136–145)
WBC # BLD AUTO: 14.19 THOUSAND/UL (ref 4.31–10.16)

## 2017-07-20 PROCEDURE — 82948 REAGENT STRIP/BLOOD GLUCOSE: CPT

## 2017-07-20 PROCEDURE — 94762 N-INVAS EAR/PLS OXIMTRY CONT: CPT

## 2017-07-20 PROCEDURE — 94640 AIRWAY INHALATION TREATMENT: CPT

## 2017-07-20 PROCEDURE — 80048 BASIC METABOLIC PNL TOTAL CA: CPT | Performed by: PHYSICIAN ASSISTANT

## 2017-07-20 PROCEDURE — 85027 COMPLETE CBC AUTOMATED: CPT | Performed by: PHYSICIAN ASSISTANT

## 2017-07-20 PROCEDURE — 94760 N-INVAS EAR/PLS OXIMETRY 1: CPT

## 2017-07-20 RX ADMIN — LEVALBUTEROL HYDROCHLORIDE 1.25 MG: 1.25 SOLUTION, CONCENTRATE RESPIRATORY (INHALATION) at 19:16

## 2017-07-20 RX ADMIN — GUAIFENESIN 600 MG: 600 TABLET, EXTENDED RELEASE ORAL at 08:36

## 2017-07-20 RX ADMIN — INSULIN HUMAN 25 UNITS: 100 INJECTION, SUSPENSION SUBCUTANEOUS at 21:54

## 2017-07-20 RX ADMIN — LEVOFLOXACIN 500 MG: 500 TABLET, FILM COATED ORAL at 08:35

## 2017-07-20 RX ADMIN — CYCLOBENZAPRINE HYDROCHLORIDE 10 MG: 10 TABLET, FILM COATED ORAL at 20:32

## 2017-07-20 RX ADMIN — LEVALBUTEROL HYDROCHLORIDE 1.25 MG: 1.25 SOLUTION, CONCENTRATE RESPIRATORY (INHALATION) at 07:18

## 2017-07-20 RX ADMIN — METHYLPREDNISOLONE 24 MG: 16 TABLET ORAL at 08:37

## 2017-07-20 RX ADMIN — OXYCODONE HYDROCHLORIDE AND ACETAMINOPHEN 1 TABLET: 5; 325 TABLET ORAL at 09:38

## 2017-07-20 RX ADMIN — INSULIN LISPRO 6 UNITS: 100 INJECTION, SOLUTION INTRAVENOUS; SUBCUTANEOUS at 21:55

## 2017-07-20 RX ADMIN — INSULIN ASPART 60 UNITS: 100 INJECTION, SUSPENSION SUBCUTANEOUS at 12:49

## 2017-07-20 RX ADMIN — GUAIFENESIN 600 MG: 600 TABLET, EXTENDED RELEASE ORAL at 20:32

## 2017-07-20 RX ADMIN — INSULIN LISPRO 4 UNITS: 100 INJECTION, SOLUTION INTRAVENOUS; SUBCUTANEOUS at 12:44

## 2017-07-20 RX ADMIN — HEPARIN SODIUM 5000 UNITS: 5000 INJECTION, SOLUTION INTRAVENOUS; SUBCUTANEOUS at 06:40

## 2017-07-20 RX ADMIN — IPRATROPIUM BROMIDE 0.5 MG: 0.5 SOLUTION RESPIRATORY (INHALATION) at 19:16

## 2017-07-20 RX ADMIN — HEPARIN SODIUM 5000 UNITS: 5000 INJECTION, SOLUTION INTRAVENOUS; SUBCUTANEOUS at 13:44

## 2017-07-20 RX ADMIN — HEPARIN SODIUM 5000 UNITS: 5000 INJECTION, SOLUTION INTRAVENOUS; SUBCUTANEOUS at 21:54

## 2017-07-20 RX ADMIN — INSULIN ASPART 60 UNITS: 100 INJECTION, SUSPENSION SUBCUTANEOUS at 08:36

## 2017-07-20 RX ADMIN — PRAVASTATIN SODIUM 80 MG: 80 TABLET ORAL at 15:52

## 2017-07-20 RX ADMIN — LEVALBUTEROL HYDROCHLORIDE 1.25 MG: 1.25 SOLUTION, CONCENTRATE RESPIRATORY (INHALATION) at 13:19

## 2017-07-20 RX ADMIN — INSULIN ASPART 60 UNITS: 100 INJECTION, SUSPENSION SUBCUTANEOUS at 18:11

## 2017-07-20 RX ADMIN — METOPROLOL TARTRATE 25 MG: 25 TABLET ORAL at 08:35

## 2017-07-20 RX ADMIN — IPRATROPIUM BROMIDE 0.5 MG: 0.5 SOLUTION RESPIRATORY (INHALATION) at 13:20

## 2017-07-20 RX ADMIN — Medication 1000 MG: at 08:35

## 2017-07-20 RX ADMIN — IPRATROPIUM BROMIDE 0.5 MG: 0.5 SOLUTION RESPIRATORY (INHALATION) at 07:18

## 2017-07-20 RX ADMIN — INSULIN LISPRO 12 UNITS: 100 INJECTION, SOLUTION INTRAVENOUS; SUBCUTANEOUS at 18:08

## 2017-07-20 RX ADMIN — OXYCODONE HYDROCHLORIDE AND ACETAMINOPHEN 1 TABLET: 5; 325 TABLET ORAL at 20:32

## 2017-07-20 RX ADMIN — CYCLOBENZAPRINE HYDROCHLORIDE 10 MG: 10 TABLET, FILM COATED ORAL at 09:38

## 2017-07-20 NOTE — PROGRESS NOTES
[facility] Progress Note  Patient: Radha Lombardi Sr  73 y o  male   MRN: [id number]  PCP: Kimberly Joseph MD  Unit/Bed#: [facility] 914-01 Encounter: [id number]  Date Of Visit: 07/20/17    Assessment:    Principal Problem:    COPD exacerbation  Active Problems:    Coronary artery disease involving native coronary artery without angina pectoris    Obesity (BMI 30-39  9)    Dyslipidemia    Diabetic polyneuropathy associated with type 2 diabetes mellitus    Venous stasis dermatitis of both lower extremities    Opioid dependence    Leukocytosis      Plan:  1  COPD with acute exacerbation-pulmonology on board  Change to p o  steroids at this point  2  Acute on chronic hypoxic respiratory failure-supplemental oxygen been as tolerated  3  Right lower lobe pulmonary nodule-outpatient follow-up with the pulmonology  4  Leukocytosis-likely secondary to steroid we will recheck in the morning  5  Type 2 diabetes with uncontrolled blood sugar-endocrinology on board once her blood sugar is more stabilized patient can be discharged home  6  Acute kidney injury-appears to be resolved  7  Hypertension-blood pressure acceptable    VTE Pharmacologic Prophylaxis:   Pharmacologic: Heparin  Mechanical VTE Prophylaxis in Place: Yes    Patient Centered Rounds: I have performed bedside rounds with nursing staff today  Discussions with Specialists or Other Care Team Provider:     Education and Discussions with Family / Patient:    Time Spent for Care: 30 minutes  More than 50% of total time spent on counseling and coordination of care as described above  Current Length of Stay: 3 day(s)    Current Patient Status: Inpatient   Certification Statement: The patient will continue to require additional inpatient hospital stay due to Uncontrolled blood sugar    Discharge Plan / Estimated Discharge Date:     Code Status: Level 1 - Full Code      Subjective:   Patient seen and examined  Feeling better   Patient is very upset that he didn't get his insulin before his meals  Objective:     Vitals:   Temp (24hrs), Av 2 °F (36 8 °C), Min:97 °F (36 1 °C), Max:99 °F (37 2 °C)    HR:  [] 88  Resp:  [18-22] 18  BP: (123-142)/(59-68) 123/59  SpO2:  [91 %-96 %] 95 %  Body mass index is 37 97 kg/m²  Input and Output Summary (last 24 hours): Intake/Output Summary (Last 24 hours) at 17 1712  Last data filed at 17 1500   Gross per 24 hour   Intake              800 ml   Output             2300 ml   Net            -1500 ml       Physical Exam:     Physical Exam   Constitutional: He is oriented to person, place, and time  He appears well-developed and well-nourished  HENT:   Head: Normocephalic and atraumatic  Eyes: EOM are normal  Pupils are equal, round, and reactive to light  Neck: Normal range of motion  Neck supple  Cardiovascular: Normal rate and regular rhythm  No murmur heard  Pulmonary/Chest: Effort normal  No respiratory distress  He has no wheezes  Decreased breath sounds bilateral   Abdominal: Soft  Bowel sounds are normal  He exhibits no distension  Musculoskeletal: Normal range of motion  He exhibits no edema  Neurological: He is alert and oriented to person, place, and time  No cranial nerve deficit  Skin: Skin is warm and dry  Additional Data:     Labs:      Results from last 7 days  Lab Units 17  0517   WBC Thousand/uL 14 19*  < > 14 39*   HEMOGLOBIN g/dL 13 0  < > 13 4   HEMATOCRIT % 41 4  < > 41 1   PLATELETS Thousands/uL 207  < > 168   NEUTROS PCT %  --   --  82*   LYMPHS PCT %  --   --  11*   MONOS PCT %  --   --  6   EOS PCT %  --   --  1   < > = values in this interval not displayed      Results from last 7 days  Lab Units 17  0511  17   SODIUM mmol/L 138  < > 138   POTASSIUM mmol/L 4 3  < > 4 3   CHLORIDE mmol/L 101  < > 99*   CO2 mmol/L 31  < > 34*   BUN mg/dL 31*  < > 18   CREATININE mg/dL 1 18  < > 1 24   CALCIUM mg/dL 8 2*  < > 8 6 TOTAL PROTEIN g/dL  --   --  7 5   BILIRUBIN TOTAL mg/dL  --   --  0 24   ALK PHOS U/L  --   --  81   ALT U/L  --   --  71   AST U/L  --   --  67*   GLUCOSE RANDOM mg/dL 132  < > 257*   < > = values in this interval not displayed  * I Have Reviewed All Lab Data Listed Above  * Additional Pertinent Lab Tests Reviewed: María 66 Admission Reviewed    Imaging:    Imaging Reports Reviewed Today Include:   Imaging Personally Reviewed by Myself Includes:      Recent Cultures (last 7 days):           Last 24 Hours Medication List:     cyclobenzaprine 10 mg Oral BID   docusate sodium 100 mg Oral BID   fish oil 1,000 mg Oral Daily   guaiFENesin 600 mg Oral Q12H [address]   heparin (porcine) 5,000 Units Subcutaneous Q8H [address]   insulin aspart protamine-insulin aspart 60 Units Subcutaneous BID before breakfast/lunch   insulin aspart protamine-insulin aspart 60 Units Subcutaneous Before Dinner   insulin lispro 2-12 Units Subcutaneous HS   insulin lispro 4-20 Units Subcutaneous TID With Meals   insulin NPH 25 Units Subcutaneous HS   ipratropium 0 5 mg Nebulization TID   levalbuterol 1 25 mg Nebulization TID   levofloxacin 500 mg Oral Daily   [START ON 7/21/2017] methylPREDNISolone 20 mg Oral Daily   Followed by      Mona Riff ON 7/22/2017] methylPREDNISolone 16 mg Oral Daily   Followed by      Mona Riff ON 7/23/2017] methylPREDNISolone 12 mg Oral Daily   Followed by      Mona Riff ON 7/24/2017] methylPREDNISolone 8 mg Oral Daily   Followed by      Mona Riff ON 7/25/2017] methylPREDNISolone 4 mg Oral Daily   metoprolol tartrate 25 mg Oral Daily   pravastatin 80 mg Oral Daily With Dinner   senna 1 tablet Oral Daily        Today, Patient Was Seen By: Mago Velez MD    ** Please Note: This note has been constructed using a voice recognition system   **

## 2017-07-20 NOTE — PROGRESS NOTES
Progress Note - Cynthia Posey   73 y o  male MRN: [id number]    Unit/Bed#: [facility] 914-01 Encounter: [id number]      CC: diabetes f/u    Subjective:   Dennise Chapin  is a 73y o  year old male with type 2  diabetes  Feels well  No complaints  No hypoglycemia  Objective:     Vitals: Blood pressure 134/68, pulse 100, temperature 99 °F (37 2 °C), resp  rate 18, height 6' (1 829 m), weight 127 kg (280 lb), SpO2 93 %  ,Body mass index is 37 97 kg/m²  Intake/Output Summary (Last 24 hours) at 07/20/17 1039  Last data filed at 07/20/17 6642   Gross per 24 hour   Intake              920 ml   Output             2800 ml   Net            -1880 ml       Physical Exam:  General Appearance: awake, appears stated age and cooperative  Head: Normocephalic, without obvious abnormality, atraumatic  Extremities: moves all extremities  Skin: Skin color and temperature normal    Pulm: no labored breathing    Lab, Imaging and other studies: I have personally reviewed pertinent reports  Results from last 7 days  Lab Units 07/20/17  0511   SODIUM mmol/L 138   POTASSIUM mmol/L 4 3   CHLORIDE mmol/L 101   CO2 mmol/L 31   BUN mg/dL 31*   CREATININE mg/dL 1 18   GLUCOSE RANDOM mg/dL 132   CALCIUM mg/dL 8 2*       POC Glucose (mg/dl)   Date Value   07/20/2017 144 (H)   07/19/2017 154 (H)   07/19/2017 189 (H)   07/19/2017 181 (H)   07/19/2017 203 (H)   07/19/2017 125   07/19/2017 168 (H)   07/19/2017 203 (H)   07/19/2017 194 (H)   07/19/2017 191 (H)       Assessment:  Type 2 diabetes with hyperglycemia on long-term insulin therapy  COPD exacerbation   Steroid-induced hyperglycemia    Plan:  Sugars fairly stable since transition to subcutaneous insulin therapy-can be discharged home on NovoLin 70/30 60  units before meals and 20 units with bedtime snack    He will call the office a week after discharge to report blood sugars , he will need adjustment in his dose as steroids are tapered       Portions of the record may have been created with voice recognition software  Occasional wrong word or "sound a like" substitutions may have occurred due to the inherent limitations of voice recognition software  Read the chart carefully and recognize, using context, where substitutions have occurred

## 2017-07-20 NOTE — PLAN OF CARE
Problem: Potential for Falls  Goal: Patient will remain free of falls  INTERVENTIONS:  - Assess patient frequently for physical needs  -  Identify cognitive and physical deficits and behaviors that affect risk of falls    -  [place] fall precautions as indicated by assessment   - Educate patient/family on patient safety including physical limitations  - Instruct patient to call for assistance with activity based on assessment  - Modify environment to reduce risk of injury  - Consider OT/PT consult to assist with strengthening/mobility    Outcome: Progressing      Problem: PAIN - ADULT  Goal: Verbalizes/displays adequate comfort level or baseline comfort level  Interventions:  - Encourage patient to monitor pain and request assistance  - Assess pain using appropriate pain scale  - Administer analgesics based on type and severity of pain and evaluate response  - Implement non-pharmacological measures as appropriate and evaluate response  - Consider cultural and social influences on pain and pain management  - Notify physician/advanced practitioner if interventions unsuccessful or patient reports new pain   Outcome: Progressing      Problem: INFECTION - ADULT  Goal: Absence or prevention of progression during hospitalization  INTERVENTIONS:  - Assess and monitor for signs and symptoms of infection  - Monitor lab/diagnostic results  - Monitor all insertion sites, i e  indwelling lines, tubes, and drains  - Monitor endotracheal (as able) and nasal secretions for changes in amount and color  - Counce appropriate cooling/warming therapies per order  - Administer medications as ordered  - Instruct and encourage patient and family to use good hand hygiene technique  - Identify and instruct in appropriate isolation precautions for identified infection/condition   Outcome: Progressing    Goal: Absence of fever/infection during neutropenic period  INTERVENTIONS:  - Monitor WBC  - Implement neutropenic guidelines   Outcome: Progressing      Problem: SAFETY ADULT  Goal: Patient will remain free of falls  INTERVENTIONS:  - Assess patient frequently for physical needs  -  Identify cognitive and physical deficits and behaviors that affect risk of falls  -  [place] fall precautions as indicated by assessment   - Educate patient/family on patient safety including physical limitations  - Instruct patient to call for assistance with activity based on assessment  - Modify environment to reduce risk of injury  - Consider OT/PT consult to assist with strengthening/mobility    Outcome: Progressing      Problem: Nutrition/Hydration-ADULT  Goal: Nutrient/Hydration intake appropriate for improving, restoring or maintaining nutritional needs  Monitor and assess patient's nutrition/hydration status for malnutrition (ex- brittle hair, bruises, dry skin, pale skin and conjunctiva, muscle wasting, smooth red tongue, and disorientation)  Collaborate with interdisciplinary team and initiate plan and interventions as ordered  Monitor patient's weight and dietary intake as ordered or per policy  Utilize nutrition screening tool and intervene per policy  Determine patient's food preferences and provide high-protein, high-caloric foods as appropriate       INTERVENTIONS:  - Monitor oral intake, urinary output, labs, and treatment plans  - Assess nutrition and hydration status and recommend course of action  - Evaluate amount of meals eaten  - Assist patient with eating if necessary   - Allow adequate time for meals  - Recommend/ encourage appropriate diets, oral nutritional supplements, and vitamin/mineral supplements  - Order, calculate, and assess calorie counts as needed  - Recommend, monitor, and adjust tube feedings and TPN/PPN based on assessed needs  - Assess need for intravenous fluids  - Provide specific nutrition/hydration education as appropriate  - Include patient/family/caregiver in decisions related to nutrition   Outcome: Progressing      Problem: DISCHARGE PLANNING - CARE MANAGEMENT  Goal: Discharge to post-acute care or home with appropriate resources  INTERVENTIONS:  - Conduct assessment to determine patient/family and health care team treatment goals, and need for post-acute services based on payer coverage, community resources, and patient preferences, and barriers to discharge  - Address psychosocial, clinical, and financial barriers to discharge as identified in assessment in conjunction with the patient/family and health care team  - Arrange appropriate level of post-acute services according to patient's   needs and preference and payer coverage in collaboration with the physician and health care team  - Communicate with and update the patient/family, physician, and health care team regarding progress on the discharge plan  - Arrange appropriate transportation to post-acute venues   Outcome: Progressing

## 2017-07-21 ENCOUNTER — APPOINTMENT (INPATIENT)
Dept: RADIOLOGY | Facility: HOSPITAL | Age: 72
DRG: 191 | End: 2017-07-21
Attending: FAMILY MEDICINE
Payer: COMMERCIAL

## 2017-07-21 VITALS
HEIGHT: 72 IN | HEART RATE: 64 BPM | WEIGHT: 280 LBS | DIASTOLIC BLOOD PRESSURE: 73 MMHG | TEMPERATURE: 99 F | RESPIRATION RATE: 18 BRPM | OXYGEN SATURATION: 91 % | BODY MASS INDEX: 37.93 KG/M2 | SYSTOLIC BLOOD PRESSURE: 162 MMHG

## 2017-07-21 LAB
BASOPHILS # BLD AUTO: 0.02 THOUSANDS/ΜL (ref 0–0.1)
BASOPHILS NFR BLD AUTO: 0 % (ref 0–1)
EOSINOPHIL # BLD AUTO: 0.03 THOUSAND/ΜL (ref 0–0.61)
EOSINOPHIL NFR BLD AUTO: 0 % (ref 0–6)
ERYTHROCYTE [DISTWIDTH] IN BLOOD BY AUTOMATED COUNT: 15.3 % (ref 11.6–15.1)
GLUCOSE SERPL-MCNC: 126 MG/DL (ref 65–140)
GLUCOSE SERPL-MCNC: 154 MG/DL (ref 65–140)
GLUCOSE SERPL-MCNC: 169 MG/DL (ref 65–140)
GLUCOSE SERPL-MCNC: 51 MG/DL (ref 65–140)
GLUCOSE SERPL-MCNC: 52 MG/DL (ref 65–140)
HCT VFR BLD AUTO: 41.3 % (ref 36.5–49.3)
HGB BLD-MCNC: 13.4 G/DL (ref 12–17)
LYMPHOCYTES # BLD AUTO: 2.7 THOUSANDS/ΜL (ref 0.6–4.47)
LYMPHOCYTES NFR BLD AUTO: 15 % (ref 14–44)
MCH RBC QN AUTO: 27 PG (ref 26.8–34.3)
MCHC RBC AUTO-ENTMCNC: 32.4 G/DL (ref 31.4–37.4)
MCV RBC AUTO: 83 FL (ref 82–98)
MONOCYTES # BLD AUTO: 2.05 THOUSAND/ΜL (ref 0.17–1.22)
MONOCYTES NFR BLD AUTO: 12 % (ref 4–12)
NEUTROPHILS # BLD AUTO: 12.61 THOUSANDS/ΜL (ref 1.85–7.62)
NEUTS SEG NFR BLD AUTO: 73 % (ref 43–75)
NRBC BLD AUTO-RTO: 0 /100 WBCS
PLATELET # BLD AUTO: 245 THOUSANDS/UL (ref 149–390)
PMV BLD AUTO: 10.3 FL (ref 8.9–12.7)
RBC # BLD AUTO: 4.96 MILLION/UL (ref 3.88–5.62)
WBC # BLD AUTO: 17.58 THOUSAND/UL (ref 4.31–10.16)

## 2017-07-21 PROCEDURE — 85025 COMPLETE CBC W/AUTO DIFF WBC: CPT | Performed by: FAMILY MEDICINE

## 2017-07-21 PROCEDURE — 73521 X-RAY EXAM HIPS BI 2 VIEWS: CPT

## 2017-07-21 PROCEDURE — 82948 REAGENT STRIP/BLOOD GLUCOSE: CPT

## 2017-07-21 PROCEDURE — 72100 X-RAY EXAM L-S SPINE 2/3 VWS: CPT

## 2017-07-21 PROCEDURE — 94760 N-INVAS EAR/PLS OXIMETRY 1: CPT

## 2017-07-21 PROCEDURE — 94640 AIRWAY INHALATION TREATMENT: CPT

## 2017-07-21 RX ORDER — METHYLPREDNISOLONE 4 MG/1
TABLET ORAL
Qty: 10 TABLET | Refills: 0 | Status: SHIPPED | OUTPATIENT
Start: 2017-07-21 | End: 2017-09-28

## 2017-07-21 RX ORDER — GUAIFENESIN 600 MG
600 TABLET, EXTENDED RELEASE 12 HR ORAL EVERY 12 HOURS SCHEDULED
Refills: 0
Start: 2017-07-21 | End: 2017-07-31

## 2017-07-21 RX ORDER — INSULIN ASPART 100 [IU]/ML
60 INJECTION, SUSPENSION SUBCUTANEOUS
Refills: 0 | Status: ON HOLD
Start: 2017-07-21 | End: 2017-07-24 | Stop reason: CLARIF

## 2017-07-21 RX ORDER — CALCIUM CARBONATE 200(500)MG
500 TABLET,CHEWABLE ORAL DAILY PRN
Status: DISCONTINUED | OUTPATIENT
Start: 2017-07-21 | End: 2017-07-21 | Stop reason: HOSPADM

## 2017-07-21 RX ORDER — LEVOFLOXACIN 500 MG/1
500 TABLET, FILM COATED ORAL DAILY
Qty: 1 TABLET | Refills: 0 | Status: SHIPPED | OUTPATIENT
Start: 2017-07-21 | End: 2017-07-24 | Stop reason: HOSPADM

## 2017-07-21 RX ADMIN — Medication 500 MG: at 11:09

## 2017-07-21 RX ADMIN — METHYLPREDNISOLONE 20 MG: 16 TABLET ORAL at 07:55

## 2017-07-21 RX ADMIN — INSULIN ASPART 60 UNITS: 100 INJECTION, SUSPENSION SUBCUTANEOUS at 17:34

## 2017-07-21 RX ADMIN — GUAIFENESIN 600 MG: 600 TABLET, EXTENDED RELEASE ORAL at 07:55

## 2017-07-21 RX ADMIN — CYCLOBENZAPRINE HYDROCHLORIDE 10 MG: 10 TABLET, FILM COATED ORAL at 07:56

## 2017-07-21 RX ADMIN — LEVALBUTEROL HYDROCHLORIDE 1.25 MG: 1.25 SOLUTION, CONCENTRATE RESPIRATORY (INHALATION) at 13:11

## 2017-07-21 RX ADMIN — METOPROLOL TARTRATE 25 MG: 25 TABLET ORAL at 07:55

## 2017-07-21 RX ADMIN — IPRATROPIUM BROMIDE 0.5 MG: 0.5 SOLUTION RESPIRATORY (INHALATION) at 13:12

## 2017-07-21 RX ADMIN — INSULIN LISPRO 4 UNITS: 100 INJECTION, SOLUTION INTRAVENOUS; SUBCUTANEOUS at 11:12

## 2017-07-21 RX ADMIN — Medication 1000 MG: at 07:57

## 2017-07-21 RX ADMIN — HEPARIN SODIUM 5000 UNITS: 5000 INJECTION, SOLUTION INTRAVENOUS; SUBCUTANEOUS at 05:26

## 2017-07-21 RX ADMIN — OXYCODONE HYDROCHLORIDE AND ACETAMINOPHEN 1 TABLET: 5; 325 TABLET ORAL at 07:54

## 2017-07-21 RX ADMIN — INSULIN LISPRO 4 UNITS: 100 INJECTION, SOLUTION INTRAVENOUS; SUBCUTANEOUS at 08:09

## 2017-07-21 RX ADMIN — INSULIN ASPART 60 UNITS: 100 INJECTION, SUSPENSION SUBCUTANEOUS at 11:14

## 2017-07-21 RX ADMIN — LEVOFLOXACIN 500 MG: 500 TABLET, FILM COATED ORAL at 07:56

## 2017-07-21 RX ADMIN — LEVALBUTEROL HYDROCHLORIDE 1.25 MG: 1.25 SOLUTION, CONCENTRATE RESPIRATORY (INHALATION) at 07:13

## 2017-07-21 RX ADMIN — INSULIN ASPART 60 UNITS: 100 INJECTION, SUSPENSION SUBCUTANEOUS at 08:09

## 2017-07-21 RX ADMIN — HEPARIN SODIUM 5000 UNITS: 5000 INJECTION, SOLUTION INTRAVENOUS; SUBCUTANEOUS at 14:00

## 2017-07-21 RX ADMIN — PRAVASTATIN SODIUM 80 MG: 80 TABLET ORAL at 15:53

## 2017-07-21 RX ADMIN — IPRATROPIUM BROMIDE 0.5 MG: 0.5 SOLUTION RESPIRATORY (INHALATION) at 07:13

## 2017-07-21 NOTE — SOCIAL WORK
Cm reviewed patient during care coordination rounds  Patient is medically stable for d/c today  Patient was interested in COPD program through [facility]  Patient in agreement with sending referral  Cm placed referral but informed that [facility] does not accept patient's insurance  Patient is not interested in pursuing another agency for these services  Cm informed that patient's wife will transport and assist patient at home at time of d/c  CM reviewed d/c planning process including the following: identifying help at home, patient preference for d/c planning needs, Discharge Lounge, [facility] Meds to Bed program, availability of treatment team to discuss questions or concerns patient and/or family may have regarding understanding medications and recognizing signs and symptoms once discharged  CM also encouraged patient to follow up with all recommended appointments after discharge  Patient advised of importance for patient and family to participate in managing patients medical well being  Patient's wife to arrive at hospital around 1pm today  No other needs identified at this time

## 2017-07-21 NOTE — PROGRESS NOTES
[facility] nursing since the patient had a fall in the bathroom  Patient reported that he was sitting on the shower chair and taking a shower and she slid from this shower chair and fell to the floor   Complaining of pain in the back   no definite tenderness on palpation   patient reported that he didn't hit his head   stable vital signs  We'll update obtain x-ray of the pelvis and also lumbar spine

## 2017-07-21 NOTE — DISCHARGE SUMMARY
Discharge Summary - [facility]    Patient Information: Marcella Estrella  73 y o  male MRN: [id number]  Unit/Bed#: [facility] 914-01 Encounter: [id number]    Discharging Physician / Practitioner: Ene Chan MD  PCP: Katina Sher MD  Admission Date: 7/16/2017  Discharge Date: 07/21/17    Reason for Admission:  Shortness of breath    Discharge Diagnoses:     Principal Problem:    COPD exacerbation  Active Problems:    Coronary artery disease involving native coronary artery without angina pectoris    Obesity (BMI 30-39  9)    Dyslipidemia    Diabetic polyneuropathy associated with type 2 diabetes mellitus    Venous stasis dermatitis of both lower extremities    Opioid dependence    Leukocytosis  Resolved Problems:    * No resolved hospital problems  *      Consultations During Hospital Stay:  ·  pulmonology  ·  endocrinology    Procedures Performed:    x-ray of the lumbar spine- no acute fracture   x-ray pelvis with bilateral hip- no fracture   chest x-ray- no active pulmonary disease  Significant Findings / Test Results: Incidental Findings:   · none    Test Results Pending at Discharge (will require follow up):   · none     Outpatient Tests Requested:  · none    Complications:  none    Hospital Course:     Tanya Menendez Sr  is a 73 y o  male patient who originally presented to the hospital on 7/16/2017 due to  Shortness of breath  Patient was found to be in COPD exacerbation  Patient was started on intravenous antibiotics and also with intravenous steroids  Patient was followed by   Pulmonology in the hospital  Patient's symptoms improved and his breathing been back to his baseline at the time of discharge  Patient is on home oxygen which will be continued as an outpatient  Patient with history of type 2 diabetes with uncontrolled blood sugar  Endocrinology was consulted to uncontrolled blood sugar initially patient was on insulin drip which was subsequently changed to  Subcutaneous insulin  At the time of discharge patient will be discharged home with NovoLog 70/30  60 units 3 times a day with meals and also Novolin N 20 units at bedtime with snacks  Patient had an episode of hypoglycemia on the day of discharge but it was recovered quickly with the additional snack  Patient remained hemodynamically stable and afebrile for details effort to the chart  Patient will be discharged home with the Medrol Dosepak which he needs it for 4 more days and also levofloxacin for one more day for his COPD exacerbation  Patient was instructed by endocrinology to check blood sugar 4 times a day and keep a log and take the log to the appointment with endocrinology and also call the endocrinology office for further management of his blood sugar  Condition at Discharge: good     Discharge Day Visit / Exam:     Subjective:   Patient seen and examined  No specific complaints feeling well wants to go home today  Discussed with endocrinology patient is stable to be discharged home today  Vitals: Blood Pressure: 162/73 (07/21/17 1640)  Pulse: 64 (07/21/17 1640)  Temperature: 99 °F (37 2 °C) (07/21/17 1640)  Temp Source: Oral (07/21/17 1640)  Respirations: 18 (07/21/17 1640)  Height: 6' (182 9 cm) (07/17/17 0824)  Weight - Scale: 127 kg (280 lb) (07/17/17 0824)  SpO2: 91 % (07/21/17 1640)  Exam:   Physical Exam   Constitutional: He is oriented to person, place, and time  He appears well-developed and well-nourished  HENT:   Head: Normocephalic and atraumatic  Eyes: EOM are normal  Pupils are equal, round, and reactive to light  Neck: Normal range of motion  Neck supple  Cardiovascular: Normal rate and regular rhythm  No murmur heard  Pulmonary/Chest: Effort normal and breath sounds normal  No respiratory distress  Abdominal: Soft  Bowel sounds are normal  He exhibits no distension  There is no tenderness  Musculoskeletal: Normal range of motion  He exhibits no edema     Neurological: He is alert and oriented to person, place, and time  Skin: Skin is warm and dry  Discussion with Family:  Wife updated in detail    Discharge instructions/Information to patient and family:   See after visit summary for information provided to patient and family  Provisions for Follow-Up Care:  See after visit summary for information related to follow-up care and any pertinent home health orders  Disposition:     Home    For Discharges to [facility] SNF:   · Not Applicable to this Patient - Not Applicable to this Patient    Planned Readmission:      Discharge Statement:  I spent 45 minutes discharging the patient  This time was spent on the day of discharge  I had direct contact with the patient on the day of discharge  Greater than 50% of the total time was spent examining patient, answering all patient questions, arranging and discussing plan of care with patient as well as directly providing post-discharge instructions  Additional time then spent on discharge activities  Discharge Medications:  See after visit summary for reconciled discharge medications provided to patient and family        ** Please Note: This note has been constructed using a voice recognition system **

## 2017-07-21 NOTE — ED ATTENDING ATTESTATION
Vern Arcos MD, saw and evaluated the patient  I have discussed the patient with the resident/non-physician practitioner and agree with the resident's/non-physician practitioner's findings, Plan of Care, and MDM as documented in the resident's/non-physician practitioner's note, except where noted  All available labs and Radiology studies were reviewed  Patient with numerous medical problems including cor pulmonale, COPD, coming in with shortness of breath  The patient has been having worsening shortness of breath which is unresponsive to his home medications  On arrival to the emergency department, the patient is acutely dyspneic without improvement with supplemental oxygen  The patient has no JVD  He has increased work of breathing with tripoding  His vital signs were reviewed  The patient was not moving much air, and has scattered wheezes, but overall decreased air movement  Abdomen benign  No palpable cords or leg swelling  Exam otherwise normal  Patient given a heart neb, patient reports some improvement, but still with significant dyspnea  Given second heart neb, given magnesium, we'll admit for respiratory distress secondary to COPD  Patient required 30 minutes of bedside critical care time outside of separately billable procedures     At this point I agree with the current assessment done in the Emergency Department    I have conducted an independent evaluation of this patient a history and physical is as follows:      Critical Care Time  CritCare Time

## 2017-07-21 NOTE — PROGRESS NOTES
Progress Note - Mario Dia Sr  73 y o  male MRN: [id number]    Unit/Bed#: [facility] 914-01 Encounter: [id number]      CC: diabetes f/u    Subjective:   Juan Pacheco  is a 73y o  year old male with type 2  diabetes  Feels well  No complaints  Was hypoglycemic this morning  Objective:     Vitals: Blood pressure 170/76, pulse 62, temperature 97 7 °F (36 5 °C), temperature source Oral, resp  rate 18, height 6' (1 829 m), weight 127 kg (280 lb), SpO2 92 %  ,Body mass index is 37 97 kg/m²  Intake/Output Summary (Last 24 hours) at 07/21/17 1018  Last data filed at 07/21/17 0912   Gross per 24 hour   Intake             1590 ml   Output             1550 ml   Net               40 ml       Physical Exam:  General Appearance: awake, appears stated age and cooperative  Head: Normocephalic, without obvious abnormality, atraumatic  Extremities: moves all extremities  Skin: Skin color and temperature normal    Pulm: no labored breathing    Lab, Imaging and other studies: I have personally reviewed pertinent reports  Results from last 7 days  Lab Units 07/20/17  0511   SODIUM mmol/L 138   POTASSIUM mmol/L 4 3   CHLORIDE mmol/L 101   CO2 mmol/L 31   BUN mg/dL 31*   CREATININE mg/dL 1 18   GLUCOSE RANDOM mg/dL 132   CALCIUM mg/dL 8 2*       POC Glucose (mg/dl)   Date Value   07/21/2017 169 (H)   07/21/2017 51 (L)   07/21/2017 52 (L)   07/20/2017 267 (H)   07/20/2017 294 (H)   07/20/2017 312 (H)   07/20/2017 204 (H)   07/20/2017 144 (H)   07/19/2017 154 (H)   07/19/2017 189 (H)       Assessment:  Type 2 diabetes with hyperglycemia on long-term insulin therapy  COPD axis the patient  Steroid-induced hyperglycemia    Plan:  Mild hypoglycemia this morning-can be discharged home on Novolin 70/30 60 units before meals- he will take 20 units with a bedtime snack  He will monitor blood sugars 3-4 times a day at home-if he has any fasting hypoglycemia he will discontinue the bedtime dose of Novolin 70/30     call the office within one week with a  fingerstick log  As  Steroid dose  is decreased he will need adjustment in his insulin dose      Portions of the record may have been created with voice recognition software  Occasional wrong word or "sound a like" substitutions may have occurred due to the inherent limitations of voice recognition software  Read the chart carefully and recognize, using context, where substitutions have occurred

## 2017-07-23 ENCOUNTER — HOSPITAL ENCOUNTER (OUTPATIENT)
Facility: HOSPITAL | Age: 72
Setting detail: OBSERVATION
Discharge: HOME HEALTH | End: 2017-07-24
Attending: EMERGENCY MEDICINE | Admitting: FAMILY MEDICINE
Payer: COMMERCIAL

## 2017-07-23 ENCOUNTER — APPOINTMENT (EMERGENCY)
Dept: RADIOLOGY | Facility: HOSPITAL | Age: 72
End: 2017-07-23
Payer: COMMERCIAL

## 2017-07-23 DIAGNOSIS — J44.9 COPD (CHRONIC OBSTRUCTIVE PULMONARY DISEASE) (HCC): ICD-10-CM

## 2017-07-23 DIAGNOSIS — J96.11 CHRONIC RESPIRATORY FAILURE WITH HYPOXIA (HCC): ICD-10-CM

## 2017-07-23 DIAGNOSIS — S22.41XA MULTIPLE FRACTURES OF RIBS, RIGHT SIDE, INITIAL ENCOUNTER FOR CLOSED FRACTURE: Primary | ICD-10-CM

## 2017-07-23 DIAGNOSIS — J44.9 COPD WITHOUT EXACERBATION (HCC): ICD-10-CM

## 2017-07-23 LAB — GLUCOSE SERPL-MCNC: 283 MG/DL (ref 65–140)

## 2017-07-23 PROCEDURE — 99285 EMERGENCY DEPT VISIT HI MDM: CPT

## 2017-07-23 PROCEDURE — 71250 CT THORAX DX C-: CPT

## 2017-07-23 PROCEDURE — 94762 N-INVAS EAR/PLS OXIMTRY CONT: CPT

## 2017-07-23 PROCEDURE — 82948 REAGENT STRIP/BLOOD GLUCOSE: CPT

## 2017-07-23 PROCEDURE — 74176 CT ABD & PELVIS W/O CONTRAST: CPT

## 2017-07-23 PROCEDURE — 94760 N-INVAS EAR/PLS OXIMETRY 1: CPT

## 2017-07-23 PROCEDURE — 96372 THER/PROPH/DIAG INJ SC/IM: CPT

## 2017-07-23 RX ORDER — ALBUTEROL SULFATE 2.5 MG/3ML
2.5 SOLUTION RESPIRATORY (INHALATION) EVERY 4 HOURS PRN
Status: DISCONTINUED | OUTPATIENT
Start: 2017-07-23 | End: 2017-07-24 | Stop reason: HOSPADM

## 2017-07-23 RX ORDER — METHYLPREDNISOLONE 4 MG/1
8 TABLET ORAL
Status: COMPLETED | OUTPATIENT
Start: 2017-07-24 | End: 2017-07-24

## 2017-07-23 RX ORDER — CYCLOBENZAPRINE HCL 10 MG
10 TABLET ORAL 2 TIMES DAILY
Status: DISCONTINUED | OUTPATIENT
Start: 2017-07-23 | End: 2017-07-24 | Stop reason: HOSPADM

## 2017-07-23 RX ORDER — INSULIN ASPART 100 [IU]/ML
60 INJECTION, SUSPENSION SUBCUTANEOUS
Status: DISCONTINUED | OUTPATIENT
Start: 2017-07-23 | End: 2017-07-24

## 2017-07-23 RX ORDER — ACETAMINOPHEN 325 MG/1
650 TABLET ORAL EVERY 6 HOURS PRN
Status: DISCONTINUED | OUTPATIENT
Start: 2017-07-23 | End: 2017-07-24 | Stop reason: HOSPADM

## 2017-07-23 RX ORDER — METHYLPREDNISOLONE 4 MG/1
4 TABLET ORAL
Status: DISCONTINUED | OUTPATIENT
Start: 2017-07-25 | End: 2017-07-24 | Stop reason: HOSPADM

## 2017-07-23 RX ORDER — MORPHINE SULFATE 2 MG/ML
2 INJECTION, SOLUTION INTRAMUSCULAR; INTRAVENOUS EVERY 4 HOURS PRN
Status: DISCONTINUED | OUTPATIENT
Start: 2017-07-23 | End: 2017-07-24 | Stop reason: HOSPADM

## 2017-07-23 RX ORDER — GUAIFENESIN 600 MG
600 TABLET, EXTENDED RELEASE 12 HR ORAL EVERY 12 HOURS SCHEDULED
Status: DISCONTINUED | OUTPATIENT
Start: 2017-07-23 | End: 2017-07-24 | Stop reason: HOSPADM

## 2017-07-23 RX ORDER — METHOCARBAMOL 500 MG/1
500 TABLET, FILM COATED ORAL EVERY 6 HOURS PRN
Status: DISCONTINUED | OUTPATIENT
Start: 2017-07-23 | End: 2017-07-24 | Stop reason: HOSPADM

## 2017-07-23 RX ORDER — LIDOCAINE 50 MG/G
1 PATCH TOPICAL DAILY
Status: DISCONTINUED | OUTPATIENT
Start: 2017-07-24 | End: 2017-07-24 | Stop reason: HOSPADM

## 2017-07-23 RX ORDER — LISINOPRIL 20 MG/1
40 TABLET ORAL DAILY
Status: DISCONTINUED | OUTPATIENT
Start: 2017-07-24 | End: 2017-07-24 | Stop reason: HOSPADM

## 2017-07-23 RX ORDER — PRAVASTATIN SODIUM 40 MG
40 TABLET ORAL
Status: DISCONTINUED | OUTPATIENT
Start: 2017-07-23 | End: 2017-07-24 | Stop reason: HOSPADM

## 2017-07-23 RX ORDER — OXYCODONE HYDROCHLORIDE AND ACETAMINOPHEN 5; 325 MG/1; MG/1
1.5 TABLET ORAL EVERY 4 HOURS PRN
Status: DISCONTINUED | OUTPATIENT
Start: 2017-07-23 | End: 2017-07-23

## 2017-07-23 RX ORDER — KETOROLAC TROMETHAMINE 30 MG/ML
15 INJECTION, SOLUTION INTRAMUSCULAR; INTRAVENOUS ONCE
Status: COMPLETED | OUTPATIENT
Start: 2017-07-23 | End: 2017-07-23

## 2017-07-23 RX ORDER — OXYCODONE HYDROCHLORIDE AND ACETAMINOPHEN 5; 325 MG/1; MG/1
1.5 TABLET ORAL EVERY 4 HOURS PRN
Status: DISCONTINUED | OUTPATIENT
Start: 2017-07-23 | End: 2017-07-24 | Stop reason: HOSPADM

## 2017-07-23 RX ORDER — CHLORAL HYDRATE 500 MG
1000 CAPSULE ORAL DAILY
Status: DISCONTINUED | OUTPATIENT
Start: 2017-07-24 | End: 2017-07-24 | Stop reason: HOSPADM

## 2017-07-23 RX ADMIN — PRAVASTATIN SODIUM 40 MG: 40 TABLET ORAL at 19:18

## 2017-07-23 RX ADMIN — METHOCARBAMOL 500 MG: 500 TABLET ORAL at 20:19

## 2017-07-23 RX ADMIN — OXYCODONE HYDROCHLORIDE AND ACETAMINOPHEN 1.5 TABLET: 5; 325 TABLET ORAL at 01:05

## 2017-07-23 RX ADMIN — KETOROLAC TROMETHAMINE 15 MG: 30 INJECTION, SOLUTION INTRAMUSCULAR at 13:49

## 2017-07-23 RX ADMIN — GUAIFENESIN 600 MG: 600 TABLET, EXTENDED RELEASE ORAL at 20:20

## 2017-07-23 RX ADMIN — CYCLOBENZAPRINE HYDROCHLORIDE 10 MG: 10 TABLET, FILM COATED ORAL at 19:18

## 2017-07-23 RX ADMIN — INSULIN HUMAN 20 UNITS: 100 INJECTION, SUSPENSION SUBCUTANEOUS at 21:13

## 2017-07-23 RX ADMIN — INSULIN LISPRO 4 UNITS: 100 INJECTION, SOLUTION INTRAVENOUS; SUBCUTANEOUS at 21:13

## 2017-07-24 VITALS
SYSTOLIC BLOOD PRESSURE: 134 MMHG | OXYGEN SATURATION: 98 % | RESPIRATION RATE: 20 BRPM | HEART RATE: 81 BPM | HEIGHT: 72 IN | WEIGHT: 280 LBS | TEMPERATURE: 97.6 F | DIASTOLIC BLOOD PRESSURE: 68 MMHG | BODY MASS INDEX: 37.93 KG/M2

## 2017-07-24 PROBLEM — N18.9 CKD (CHRONIC KIDNEY DISEASE): Chronic | Status: ACTIVE | Noted: 2017-07-24

## 2017-07-24 PROBLEM — R60.0 BILATERAL LOWER EXTREMITY EDEMA: Chronic | Status: ACTIVE | Noted: 2017-07-24

## 2017-07-24 PROBLEM — D72.829 LEUKOCYTOSIS: Status: RESOLVED | Noted: 2017-07-17 | Resolved: 2017-07-24

## 2017-07-24 PROBLEM — E87.1 HYPONATREMIA: Status: ACTIVE | Noted: 2017-07-24

## 2017-07-24 LAB
ALBUMIN SERPL BCP-MCNC: 2.9 G/DL (ref 3.5–5)
ALP SERPL-CCNC: 71 U/L (ref 46–116)
ALT SERPL W P-5'-P-CCNC: 120 U/L (ref 12–78)
ANION GAP SERPL CALCULATED.3IONS-SCNC: 6 MMOL/L (ref 4–13)
AST SERPL W P-5'-P-CCNC: 78 U/L (ref 5–45)
BILIRUB SERPL-MCNC: 0.59 MG/DL (ref 0.2–1)
BUN SERPL-MCNC: 28 MG/DL (ref 5–25)
CALCIUM SERPL-MCNC: 8.8 MG/DL (ref 8.3–10.1)
CHLORIDE SERPL-SCNC: 100 MMOL/L (ref 100–108)
CO2 SERPL-SCNC: 27 MMOL/L (ref 21–32)
CREAT SERPL-MCNC: 1.4 MG/DL (ref 0.6–1.3)
GFR SERPL CREATININE-BSD FRML MDRD: 50.2 ML/MIN/1.73SQ M
GLUCOSE P FAST SERPL-MCNC: 186 MG/DL (ref 65–99)
GLUCOSE SERPL-MCNC: 186 MG/DL (ref 65–140)
GLUCOSE SERPL-MCNC: 212 MG/DL (ref 65–140)
GLUCOSE SERPL-MCNC: 314 MG/DL (ref 65–140)
POTASSIUM SERPL-SCNC: 5.3 MMOL/L (ref 3.5–5.3)
PROT SERPL-MCNC: 7.2 G/DL (ref 6.4–8.2)
SODIUM SERPL-SCNC: 133 MMOL/L (ref 136–145)

## 2017-07-24 PROCEDURE — G8988 SELF CARE GOAL STATUS: HCPCS

## 2017-07-24 PROCEDURE — G8978 MOBILITY CURRENT STATUS: HCPCS

## 2017-07-24 PROCEDURE — 97163 PT EVAL HIGH COMPLEX 45 MIN: CPT

## 2017-07-24 PROCEDURE — 94760 N-INVAS EAR/PLS OXIMETRY 1: CPT

## 2017-07-24 PROCEDURE — G8979 MOBILITY GOAL STATUS: HCPCS

## 2017-07-24 PROCEDURE — 97166 OT EVAL MOD COMPLEX 45 MIN: CPT

## 2017-07-24 PROCEDURE — 94640 AIRWAY INHALATION TREATMENT: CPT

## 2017-07-24 PROCEDURE — 82948 REAGENT STRIP/BLOOD GLUCOSE: CPT

## 2017-07-24 PROCEDURE — G8987 SELF CARE CURRENT STATUS: HCPCS

## 2017-07-24 PROCEDURE — 80053 COMPREHEN METABOLIC PANEL: CPT | Performed by: FAMILY MEDICINE

## 2017-07-24 RX ORDER — ACETAMINOPHEN 325 MG/1
975 TABLET ORAL EVERY 8 HOURS SCHEDULED
Qty: 30 TABLET | Refills: 0 | Status: SHIPPED | OUTPATIENT
Start: 2017-07-24 | End: 2018-02-01

## 2017-07-24 RX ORDER — INSULIN ASPART 100 [IU]/ML
20 INJECTION, SUSPENSION SUBCUTANEOUS
Status: DISCONTINUED | OUTPATIENT
Start: 2017-07-24 | End: 2017-07-24 | Stop reason: HOSPADM

## 2017-07-24 RX ORDER — METHOCARBAMOL 500 MG/1
500 TABLET, FILM COATED ORAL EVERY 6 HOURS PRN
Qty: 30 TABLET | Refills: 0 | Status: SHIPPED | OUTPATIENT
Start: 2017-07-24 | End: 2017-09-28

## 2017-07-24 RX ORDER — INSULIN ASPART 100 [IU]/ML
60 INJECTION, SUSPENSION SUBCUTANEOUS
Status: DISCONTINUED | OUTPATIENT
Start: 2017-07-24 | End: 2017-07-24 | Stop reason: HOSPADM

## 2017-07-24 RX ORDER — INSULIN ASPART 100 [IU]/ML
20 INJECTION, SUSPENSION SUBCUTANEOUS
Status: ON HOLD | COMMUNITY
End: 2017-07-24 | Stop reason: CLARIF

## 2017-07-24 RX ADMIN — CYCLOBENZAPRINE HYDROCHLORIDE 10 MG: 10 TABLET, FILM COATED ORAL at 08:25

## 2017-07-24 RX ADMIN — METHYLPREDNISOLONE 8 MG: 4 TABLET ORAL at 07:46

## 2017-07-24 RX ADMIN — Medication 1000 MG: at 08:25

## 2017-07-24 RX ADMIN — MORPHINE SULFATE 2 MG: 2 INJECTION, SOLUTION INTRAMUSCULAR; INTRAVENOUS at 12:34

## 2017-07-24 RX ADMIN — ALBUTEROL SULFATE 2.5 MG: 2.5 SOLUTION RESPIRATORY (INHALATION) at 10:50

## 2017-07-24 RX ADMIN — LISINOPRIL 40 MG: 20 TABLET ORAL at 08:25

## 2017-07-24 RX ADMIN — OXYCODONE HYDROCHLORIDE AND ACETAMINOPHEN 1.5 TABLET: 5; 325 TABLET ORAL at 08:25

## 2017-07-24 RX ADMIN — INSULIN LISPRO 12 UNITS: 100 INJECTION, SOLUTION INTRAVENOUS; SUBCUTANEOUS at 11:22

## 2017-07-24 RX ADMIN — OXYCODONE HYDROCHLORIDE AND ACETAMINOPHEN 1.5 TABLET: 5; 325 TABLET ORAL at 04:36

## 2017-07-24 RX ADMIN — INSULIN LISPRO 4 UNITS: 100 INJECTION, SOLUTION INTRAVENOUS; SUBCUTANEOUS at 06:08

## 2017-07-24 RX ADMIN — INSULIN ASPART 60 UNITS: 100 INJECTION, SUSPENSION SUBCUTANEOUS at 11:22

## 2017-07-24 RX ADMIN — METOPROLOL TARTRATE 25 MG: 25 TABLET ORAL at 08:25

## 2017-07-24 RX ADMIN — GUAIFENESIN 600 MG: 600 TABLET, EXTENDED RELEASE ORAL at 08:25

## 2017-07-24 RX ADMIN — ENOXAPARIN SODIUM 40 MG: 40 INJECTION SUBCUTANEOUS at 08:26

## 2017-07-24 RX ADMIN — INSULIN ASPART 60 UNITS: 100 INJECTION, SUSPENSION SUBCUTANEOUS at 07:46

## 2017-07-24 NOTE — CASE MANAGEMENT
Continued Stay Review    Date:     Vital Signs: Temp (24hrs), Av 1 °F (36 7 °C), Min:97 4 °F (36 3 °C), Max:99 2 °F (37 3 °C)     HR:  [] 68  Resp:  [19-20] 20  BP: (122-159)/(57-91) 159/91  SpO2:  [80 %-97 %] 96 %  Body mass index is 37 97 kg/m²  Medications:   Scheduled Meds:   cyclobenzaprine 10 mg Oral Daily   docusate sodium 100 mg Oral BID   fish oil 1,000 mg Oral Daily   guaiFENesin 600 mg Oral Q12H [address]   heparin (porcine) 5,000 Units Subcutaneous Q8H [address]   [START ON 2017] insulin aspart protamine-insulin aspart 60 Units Subcutaneous BID before breakfast/lunch   insulin aspart protamine-insulin aspart 60 Units Subcutaneous Before Dinner   insulin lispro 2-12 Units Subcutaneous HS   [START ON 2017] insulin lispro 4-20 Units Subcutaneous TID With Meals   insulin NPH 25 Units Subcutaneous HS   ipratropium 0 5 mg Nebulization TID   levalbuterol 1 25 mg Nebulization TID   levofloxacin 500 mg Oral Daily   methylPREDNISolone sodium succinate 40 mg Intravenous Q8H   metoprolol tartrate 25 mg Oral Daily   pravastatin 80 mg Oral Daily With Dinner   senna 1 tablet Oral Daily       Continuous Infusions:   insulin regular (HumuLIN R,NovoLIN R) 1 Units/mL in sodium chloride 0 9 % 100 mL infusion  Rate: 0 3-21 mL/hr Freq: Titrated Route: IV    PRN Meds:   Oxycodone po x2    Abnormal Labs/Diagnostic Results:     Age/Sex: 73 y o  male     Assessment/Plan:   Assessment:     Principal Problem:    COPD exacerbation  Active Problems:    Coronary artery disease involving native coronary artery without angina pectoris    Obesity (BMI 30-39  9)    Dyslipidemia    Diabetic polyneuropathy associated with type 2 diabetes mellitus    Venous stasis dermatitis of both lower extremities    Opioid dependence    Leukocytosis        Plan:     · COPD with exacerbation  ? Pulm following, discussed with Dr Mariano Jimenes today  ?  Patient still on IV steroids with good symptomatic improvement; anticipate taper today ? Xopenex and atrovent Q6hr   ? Continue Mucinex  · Acute on chronic hypoxic respiratory failure  ? Patient continuing to require supplemental O2  Wean as able   ? Ambulatory pulse ox prior to d/c  ? Outpatient sleep study  ? Overnight pulse ox monitoring   · RLL pulmonary nodule, 4mm  ? Outpatient follow up  · Leukocytosis  ? Suspect steroid-induced   · MARANDA  ? Suspect was 2/2 dehydration in setting of hyperglycemia  · Type 2 DM with long term insulin use and hyperglycemia  ? Endocrine following; insulin per their recommendations  · HTN  ? BP stable  Lisinopril on hold given renal function  Rpeat BMP in AM  If creatinine remains stable consider re-starting   · Elevated BNP  ?     ? Suspect SOB is 2/2 COPD exacerbation, and not CHF at this time  Patient with stable LE edema, no pleural effusions on CXR, and symptomatically improving with nabs and IV steroids  Discussed with attending pulmonologist,  No diuresis at this time  · HLD  ? Continue statin  · Obesity  ? Encourage Diet, exercise, weight loss         VTE Pharmacologic Prophylaxis:   Pharmacologic: Heparin  Mechanical VTE Prophylaxis in Place: Yes    Current Length of Stay: 2 day(s)     Current Patient Status: Inpatient   Certification Statement: The patient will continue to require additional inpatient hospital stay due to requiring IV steroids and still on insulin GTT     Discharge Plan: Pending the above   Would anticipate home

## 2017-07-26 ENCOUNTER — GENERIC CONVERSION - ENCOUNTER (OUTPATIENT)
Dept: OTHER | Facility: OTHER | Age: 72
End: 2017-07-26

## 2017-07-27 ENCOUNTER — ALLSCRIPTS OFFICE VISIT (OUTPATIENT)
Dept: OTHER | Facility: OTHER | Age: 72
End: 2017-07-27

## 2017-08-04 ENCOUNTER — LAB REQUISITION (OUTPATIENT)
Dept: LAB | Facility: HOSPITAL | Age: 72
End: 2017-08-04
Payer: COMMERCIAL

## 2017-08-04 DIAGNOSIS — I10 ESSENTIAL (PRIMARY) HYPERTENSION: ICD-10-CM

## 2017-08-04 DIAGNOSIS — E78.00 PURE HYPERCHOLESTEROLEMIA: ICD-10-CM

## 2017-08-04 DIAGNOSIS — E11.65 TYPE 2 DIABETES MELLITUS WITH HYPERGLYCEMIA (HCC): ICD-10-CM

## 2017-08-04 DIAGNOSIS — E11.9 TYPE 2 DIABETES MELLITUS WITHOUT COMPLICATIONS (HCC): ICD-10-CM

## 2017-08-04 DIAGNOSIS — Z12.5 ENCOUNTER FOR SCREENING FOR MALIGNANT NEOPLASM OF PROSTATE: ICD-10-CM

## 2017-08-04 LAB
ALBUMIN SERPL BCP-MCNC: 3 G/DL (ref 3.5–5)
ALBUMIN SERPL BCP-MCNC: 3 G/DL (ref 3.5–5)
ALP SERPL-CCNC: 87 U/L (ref 46–116)
ALP SERPL-CCNC: 87 U/L (ref 46–116)
ALT SERPL W P-5'-P-CCNC: 41 U/L (ref 12–78)
ALT SERPL W P-5'-P-CCNC: 41 U/L (ref 12–78)
ANION GAP SERPL CALCULATED.3IONS-SCNC: 6 MMOL/L (ref 4–13)
ANION GAP SERPL CALCULATED.3IONS-SCNC: 6 MMOL/L (ref 4–13)
AST SERPL W P-5'-P-CCNC: 36 U/L (ref 5–45)
AST SERPL W P-5'-P-CCNC: 36 U/L (ref 5–45)
BILIRUB DIRECT SERPL-MCNC: 0.14 MG/DL (ref 0–0.2)
BILIRUB SERPL-MCNC: 0.47 MG/DL (ref 0.2–1)
BILIRUB SERPL-MCNC: 0.47 MG/DL (ref 0.2–1)
BUN SERPL-MCNC: 20 MG/DL (ref 5–25)
BUN SERPL-MCNC: 20 MG/DL (ref 5–25)
CALCIUM SERPL-MCNC: 8.4 MG/DL (ref 8.3–10.1)
CALCIUM SERPL-MCNC: 8.4 MG/DL (ref 8.3–10.1)
CHLORIDE SERPL-SCNC: 99 MMOL/L (ref 100–108)
CHLORIDE SERPL-SCNC: 99 MMOL/L (ref 100–108)
CHOLEST SERPL-MCNC: 147 MG/DL (ref 50–200)
CO2 SERPL-SCNC: 31 MMOL/L (ref 21–32)
CO2 SERPL-SCNC: 31 MMOL/L (ref 21–32)
CREAT SERPL-MCNC: 1.16 MG/DL (ref 0.6–1.3)
CREAT SERPL-MCNC: 1.16 MG/DL (ref 0.6–1.3)
ERYTHROCYTE [DISTWIDTH] IN BLOOD BY AUTOMATED COUNT: 15.8 % (ref 11.6–15.1)
EST. AVERAGE GLUCOSE BLD GHB EST-MCNC: 209 MG/DL
GFR SERPL CREATININE-BSD FRML MDRD: 64 ML/MIN/1.73SQ M
GFR SERPL CREATININE-BSD FRML MDRD: 64 ML/MIN/1.73SQ M
GLUCOSE P FAST SERPL-MCNC: 121 MG/DL (ref 65–99)
GLUCOSE P FAST SERPL-MCNC: 121 MG/DL (ref 65–99)
HBA1C MFR BLD: 8.9 % (ref 4.2–6.3)
HCT VFR BLD AUTO: 32.5 % (ref 36.5–49.3)
HDLC SERPL-MCNC: 63 MG/DL (ref 40–60)
HGB BLD-MCNC: 10.3 G/DL (ref 12–17)
LDLC SERPL CALC-MCNC: 58 MG/DL (ref 0–100)
MCH RBC QN AUTO: 26.8 PG (ref 26.8–34.3)
MCHC RBC AUTO-ENTMCNC: 31.7 G/DL (ref 31.4–37.4)
MCV RBC AUTO: 84 FL (ref 82–98)
PLATELET # BLD AUTO: 170 THOUSANDS/UL (ref 149–390)
PMV BLD AUTO: 10.2 FL (ref 8.9–12.7)
POTASSIUM SERPL-SCNC: 4.2 MMOL/L (ref 3.5–5.3)
POTASSIUM SERPL-SCNC: 4.2 MMOL/L (ref 3.5–5.3)
PROT SERPL-MCNC: 6.8 G/DL (ref 6.4–8.2)
PROT SERPL-MCNC: 6.8 G/DL (ref 6.4–8.2)
RBC # BLD AUTO: 3.85 MILLION/UL (ref 3.88–5.62)
SODIUM SERPL-SCNC: 136 MMOL/L (ref 136–145)
SODIUM SERPL-SCNC: 136 MMOL/L (ref 136–145)
TRIGL SERPL-MCNC: 132 MG/DL
WBC # BLD AUTO: 11.56 THOUSAND/UL (ref 4.31–10.16)

## 2017-08-04 PROCEDURE — 83036 HEMOGLOBIN GLYCOSYLATED A1C: CPT | Performed by: FAMILY MEDICINE

## 2017-08-04 PROCEDURE — 85027 COMPLETE CBC AUTOMATED: CPT | Performed by: FAMILY MEDICINE

## 2017-08-04 PROCEDURE — 80053 COMPREHEN METABOLIC PANEL: CPT | Performed by: FAMILY MEDICINE

## 2017-08-04 PROCEDURE — 80061 LIPID PANEL: CPT | Performed by: FAMILY MEDICINE

## 2017-08-04 PROCEDURE — 80076 HEPATIC FUNCTION PANEL: CPT | Performed by: FAMILY MEDICINE

## 2017-08-04 PROCEDURE — 80048 BASIC METABOLIC PNL TOTAL CA: CPT | Performed by: FAMILY MEDICINE

## 2017-08-08 ENCOUNTER — LAB REQUISITION (OUTPATIENT)
Dept: LAB | Facility: HOSPITAL | Age: 72
End: 2017-08-08
Payer: COMMERCIAL

## 2017-08-08 DIAGNOSIS — C61 MALIGNANT NEOPLASM OF PROSTATE (HCC): ICD-10-CM

## 2017-08-08 LAB — PSA SERPL-MCNC: <0.1 NG/ML (ref 0–4)

## 2017-08-08 PROCEDURE — G0103 PSA SCREENING: HCPCS | Performed by: FAMILY MEDICINE

## 2017-08-14 ENCOUNTER — ALLSCRIPTS OFFICE VISIT (OUTPATIENT)
Dept: OTHER | Facility: OTHER | Age: 72
End: 2017-08-14

## 2017-08-15 ENCOUNTER — GENERIC CONVERSION - ENCOUNTER (OUTPATIENT)
Dept: OTHER | Facility: OTHER | Age: 72
End: 2017-08-15

## 2017-08-21 ENCOUNTER — GENERIC CONVERSION - ENCOUNTER (OUTPATIENT)
Dept: OTHER | Facility: OTHER | Age: 72
End: 2017-08-21

## 2017-09-28 ENCOUNTER — APPOINTMENT (EMERGENCY)
Dept: RADIOLOGY | Facility: HOSPITAL | Age: 72
DRG: 380 | End: 2017-09-28
Payer: COMMERCIAL

## 2017-09-28 ENCOUNTER — HOSPITAL ENCOUNTER (INPATIENT)
Facility: HOSPITAL | Age: 72
LOS: 7 days | Discharge: HOME | DRG: 380 | End: 2017-10-05
Attending: EMERGENCY MEDICINE | Admitting: HOSPITALIST
Payer: COMMERCIAL

## 2017-09-28 DIAGNOSIS — I25.10 CORONARY ARTERY DISEASE INVOLVING NATIVE CORONARY ARTERY WITHOUT ANGINA PECTORIS: Chronic | ICD-10-CM

## 2017-09-28 DIAGNOSIS — Z79.4 TYPE 2 DIABETES MELLITUS WITH HYPERGLYCEMIA, WITH LONG-TERM CURRENT USE OF INSULIN (HCC): ICD-10-CM

## 2017-09-28 DIAGNOSIS — N17.9 AKI (ACUTE KIDNEY INJURY) (HCC): ICD-10-CM

## 2017-09-28 DIAGNOSIS — K29.80 DUODENITIS: ICD-10-CM

## 2017-09-28 DIAGNOSIS — K31.1 PARTIAL GASTRIC OUTLET OBSTRUCTION: ICD-10-CM

## 2017-09-28 DIAGNOSIS — R10.9 ABDOMINAL PAIN: Primary | ICD-10-CM

## 2017-09-28 DIAGNOSIS — K21.9 GASTROESOPHAGEAL REFLUX DISEASE WITHOUT ESOPHAGITIS: ICD-10-CM

## 2017-09-28 DIAGNOSIS — R19.5 HEME POSITIVE STOOL: ICD-10-CM

## 2017-09-28 DIAGNOSIS — R77.8 ELEVATED TROPONIN: ICD-10-CM

## 2017-09-28 DIAGNOSIS — N18.30 STAGE 3 CHRONIC KIDNEY DISEASE (HCC): Chronic | ICD-10-CM

## 2017-09-28 DIAGNOSIS — J44.1 COPD EXACERBATION (HCC): ICD-10-CM

## 2017-09-28 DIAGNOSIS — E11.65 TYPE 2 DIABETES MELLITUS WITH HYPERGLYCEMIA, WITH LONG-TERM CURRENT USE OF INSULIN (HCC): ICD-10-CM

## 2017-09-28 DIAGNOSIS — N17.9 ACUTE RENAL FAILURE (HCC): ICD-10-CM

## 2017-09-28 LAB
ALBUMIN SERPL BCP-MCNC: 2.8 G/DL (ref 3.5–5)
ALP SERPL-CCNC: 75 U/L (ref 46–116)
ALT SERPL W P-5'-P-CCNC: 23 U/L (ref 12–78)
ANION GAP SERPL CALCULATED.3IONS-SCNC: 7 MMOL/L (ref 4–13)
APTT PPP: 30 SECONDS (ref 23–35)
AST SERPL W P-5'-P-CCNC: 20 U/L (ref 5–45)
ATRIAL RATE: 94 BPM
BASOPHILS # BLD AUTO: 0.01 THOUSANDS/ΜL (ref 0–0.1)
BASOPHILS NFR BLD AUTO: 0 % (ref 0–1)
BILIRUB SERPL-MCNC: 0.35 MG/DL (ref 0.2–1)
BUN SERPL-MCNC: 51 MG/DL (ref 5–25)
CALCIUM SERPL-MCNC: 9.2 MG/DL (ref 8.3–10.1)
CHLORIDE SERPL-SCNC: 105 MMOL/L (ref 100–108)
CO2 SERPL-SCNC: 26 MMOL/L (ref 21–32)
CREAT SERPL-MCNC: 1.58 MG/DL (ref 0.6–1.3)
EOSINOPHIL # BLD AUTO: 0.01 THOUSAND/ΜL (ref 0–0.61)
EOSINOPHIL NFR BLD AUTO: 0 % (ref 0–6)
ERYTHROCYTE [DISTWIDTH] IN BLOOD BY AUTOMATED COUNT: 16.8 % (ref 11.6–15.1)
GFR SERPL CREATININE-BSD FRML MDRD: 44 ML/MIN/1.73SQ M
GLUCOSE SERPL-MCNC: 159 MG/DL (ref 65–140)
GLUCOSE SERPL-MCNC: 160 MG/DL (ref 65–140)
GLUCOSE SERPL-MCNC: 225 MG/DL (ref 65–140)
GLUCOSE SERPL-MCNC: 246 MG/DL (ref 65–140)
HCT VFR BLD AUTO: 37.8 % (ref 36.5–49.3)
HGB BLD-MCNC: 11.3 G/DL (ref 12–17)
INR PPP: 1.11 (ref 0.86–1.16)
LIPASE SERPL-CCNC: 89 U/L (ref 73–393)
LYMPHOCYTES # BLD AUTO: 1.14 THOUSANDS/ΜL (ref 0.6–4.47)
LYMPHOCYTES NFR BLD AUTO: 8 % (ref 14–44)
MCH RBC QN AUTO: 23.8 PG (ref 26.8–34.3)
MCHC RBC AUTO-ENTMCNC: 29.9 G/DL (ref 31.4–37.4)
MCV RBC AUTO: 80 FL (ref 82–98)
MONOCYTES # BLD AUTO: 0.7 THOUSAND/ΜL (ref 0.17–1.22)
MONOCYTES NFR BLD AUTO: 5 % (ref 4–12)
NEUTROPHILS # BLD AUTO: 13.25 THOUSANDS/ΜL (ref 1.85–7.62)
NEUTS SEG NFR BLD AUTO: 87 % (ref 43–75)
NRBC BLD AUTO-RTO: 0 /100 WBCS
NT-PROBNP SERPL-MCNC: 3091 PG/ML
P AXIS: 109 DEGREES
PLATELET # BLD AUTO: 239 THOUSANDS/UL (ref 149–390)
PLATELET # BLD AUTO: 268 THOUSANDS/UL (ref 149–390)
PMV BLD AUTO: 9.5 FL (ref 8.9–12.7)
PMV BLD AUTO: 9.9 FL (ref 8.9–12.7)
POTASSIUM SERPL-SCNC: 5.3 MMOL/L (ref 3.5–5.3)
PR INTERVAL: 172 MS
PROT SERPL-MCNC: 7.5 G/DL (ref 6.4–8.2)
PROTHROMBIN TIME: 14.3 SECONDS (ref 12.1–14.4)
QRS AXIS: 98 DEGREES
QRSD INTERVAL: 128 MS
QT INTERVAL: 364 MS
QTC INTERVAL: 455 MS
RBC # BLD AUTO: 4.74 MILLION/UL (ref 3.88–5.62)
SODIUM SERPL-SCNC: 138 MMOL/L (ref 136–145)
SPECIMEN SOURCE: ABNORMAL
T WAVE AXIS: 65 DEGREES
TROPONIN I BLD-MCNC: 0.09 NG/ML (ref 0–0.08)
TROPONIN I SERPL-MCNC: 0.1 NG/ML
TROPONIN I SERPL-MCNC: 0.1 NG/ML
VENTRICULAR RATE: 94 BPM
WBC # BLD AUTO: 15.16 THOUSAND/UL (ref 4.31–10.16)

## 2017-09-28 PROCEDURE — 83690 ASSAY OF LIPASE: CPT | Performed by: EMERGENCY MEDICINE

## 2017-09-28 PROCEDURE — 74177 CT ABD & PELVIS W/CONTRAST: CPT

## 2017-09-28 PROCEDURE — 71020 HB CHEST X-RAY 2VW FRONTAL&LATL: CPT

## 2017-09-28 PROCEDURE — 96361 HYDRATE IV INFUSION ADD-ON: CPT

## 2017-09-28 PROCEDURE — 85610 PROTHROMBIN TIME: CPT | Performed by: EMERGENCY MEDICINE

## 2017-09-28 PROCEDURE — 36415 COLL VENOUS BLD VENIPUNCTURE: CPT | Performed by: EMERGENCY MEDICINE

## 2017-09-28 PROCEDURE — 82948 REAGENT STRIP/BLOOD GLUCOSE: CPT

## 2017-09-28 PROCEDURE — 96375 TX/PRO/DX INJ NEW DRUG ADDON: CPT

## 2017-09-28 PROCEDURE — 94640 AIRWAY INHALATION TREATMENT: CPT

## 2017-09-28 PROCEDURE — 84484 ASSAY OF TROPONIN QUANT: CPT

## 2017-09-28 PROCEDURE — 94760 N-INVAS EAR/PLS OXIMETRY 1: CPT

## 2017-09-28 PROCEDURE — C9113 INJ PANTOPRAZOLE SODIUM, VIA: HCPCS | Performed by: HOSPITALIST

## 2017-09-28 PROCEDURE — 80053 COMPREHEN METABOLIC PANEL: CPT | Performed by: EMERGENCY MEDICINE

## 2017-09-28 PROCEDURE — 84484 ASSAY OF TROPONIN QUANT: CPT | Performed by: HOSPITALIST

## 2017-09-28 PROCEDURE — 85049 AUTOMATED PLATELET COUNT: CPT | Performed by: HOSPITALIST

## 2017-09-28 PROCEDURE — 96374 THER/PROPH/DIAG INJ IV PUSH: CPT

## 2017-09-28 PROCEDURE — 93005 ELECTROCARDIOGRAM TRACING: CPT | Performed by: EMERGENCY MEDICINE

## 2017-09-28 PROCEDURE — 85730 THROMBOPLASTIN TIME PARTIAL: CPT | Performed by: EMERGENCY MEDICINE

## 2017-09-28 PROCEDURE — 93005 ELECTROCARDIOGRAM TRACING: CPT

## 2017-09-28 PROCEDURE — 83880 ASSAY OF NATRIURETIC PEPTIDE: CPT | Performed by: EMERGENCY MEDICINE

## 2017-09-28 PROCEDURE — 85025 COMPLETE CBC W/AUTO DIFF WBC: CPT | Performed by: EMERGENCY MEDICINE

## 2017-09-28 PROCEDURE — 99285 EMERGENCY DEPT VISIT HI MDM: CPT

## 2017-09-28 RX ORDER — ALBUTEROL SULFATE 2.5 MG/3ML
2.5 SOLUTION RESPIRATORY (INHALATION)
Status: DISCONTINUED | OUTPATIENT
Start: 2017-09-28 | End: 2017-09-28

## 2017-09-28 RX ORDER — METOPROLOL TARTRATE 5 MG/5ML
5 INJECTION INTRAVENOUS EVERY 6 HOURS
Status: DISCONTINUED | OUTPATIENT
Start: 2017-09-28 | End: 2017-10-02

## 2017-09-28 RX ORDER — DEXTROSE AND SODIUM CHLORIDE 5; .9 G/100ML; G/100ML
75 INJECTION, SOLUTION INTRAVENOUS CONTINUOUS
Status: DISCONTINUED | OUTPATIENT
Start: 2017-09-28 | End: 2017-10-01

## 2017-09-28 RX ORDER — PANTOPRAZOLE SODIUM 40 MG/1
40 INJECTION, POWDER, FOR SOLUTION INTRAVENOUS EVERY 12 HOURS SCHEDULED
Status: DISCONTINUED | OUTPATIENT
Start: 2017-09-28 | End: 2017-10-02

## 2017-09-28 RX ORDER — ONDANSETRON 2 MG/ML
4 INJECTION INTRAMUSCULAR; INTRAVENOUS ONCE
Status: COMPLETED | OUTPATIENT
Start: 2017-09-28 | End: 2017-09-28

## 2017-09-28 RX ORDER — GABAPENTIN 300 MG/1
300 CAPSULE ORAL 3 TIMES DAILY
Status: DISCONTINUED | OUTPATIENT
Start: 2017-09-28 | End: 2017-09-29

## 2017-09-28 RX ORDER — SODIUM CHLORIDE 9 MG/ML
125 INJECTION, SOLUTION INTRAVENOUS CONTINUOUS
Status: DISCONTINUED | OUTPATIENT
Start: 2017-09-28 | End: 2017-09-30

## 2017-09-28 RX ORDER — ONDANSETRON 2 MG/ML
4 INJECTION INTRAMUSCULAR; INTRAVENOUS EVERY 6 HOURS PRN
Status: DISCONTINUED | OUTPATIENT
Start: 2017-09-28 | End: 2017-10-05 | Stop reason: HOSPADM

## 2017-09-28 RX ORDER — SODIUM CHLORIDE 9 MG/ML
75 INJECTION, SOLUTION INTRAVENOUS CONTINUOUS
Status: DISCONTINUED | OUTPATIENT
Start: 2017-09-28 | End: 2017-09-28

## 2017-09-28 RX ORDER — GABAPENTIN 300 MG/1
300 CAPSULE ORAL 3 TIMES DAILY
COMMUNITY
End: 2018-02-12 | Stop reason: HOSPADM

## 2017-09-28 RX ORDER — CYCLOBENZAPRINE HCL 10 MG
10 TABLET ORAL
Status: DISCONTINUED | OUTPATIENT
Start: 2017-09-28 | End: 2017-10-05 | Stop reason: HOSPADM

## 2017-09-28 RX ORDER — IPRATROPIUM BROMIDE AND ALBUTEROL SULFATE 2.5; .5 MG/3ML; MG/3ML
3 SOLUTION RESPIRATORY (INHALATION) ONCE
Status: COMPLETED | OUTPATIENT
Start: 2017-09-28 | End: 2017-09-28

## 2017-09-28 RX ORDER — ALBUTEROL SULFATE 2.5 MG/3ML
2.5 SOLUTION RESPIRATORY (INHALATION) EVERY 4 HOURS PRN
Status: DISCONTINUED | OUTPATIENT
Start: 2017-09-28 | End: 2017-10-05 | Stop reason: HOSPADM

## 2017-09-28 RX ORDER — FENTANYL CITRATE 50 UG/ML
50 INJECTION, SOLUTION INTRAMUSCULAR; INTRAVENOUS ONCE
Status: COMPLETED | OUTPATIENT
Start: 2017-09-28 | End: 2017-09-28

## 2017-09-28 RX ORDER — MORPHINE SULFATE 2 MG/ML
1 INJECTION, SOLUTION INTRAMUSCULAR; INTRAVENOUS EVERY 4 HOURS PRN
Status: DISCONTINUED | OUTPATIENT
Start: 2017-09-28 | End: 2017-09-28

## 2017-09-28 RX ORDER — MORPHINE SULFATE 2 MG/ML
2 INJECTION, SOLUTION INTRAMUSCULAR; INTRAVENOUS
Status: DISCONTINUED | OUTPATIENT
Start: 2017-09-28 | End: 2017-10-03

## 2017-09-28 RX ORDER — GLIPIZIDE 5 MG/1
5 TABLET ORAL
COMMUNITY
End: 2017-10-31

## 2017-09-28 RX ADMIN — IPRATROPIUM BROMIDE AND ALBUTEROL SULFATE 3 ML: .5; 3 SOLUTION RESPIRATORY (INHALATION) at 12:22

## 2017-09-28 RX ADMIN — CYCLOBENZAPRINE HYDROCHLORIDE 10 MG: 10 TABLET, FILM COATED ORAL at 22:14

## 2017-09-28 RX ADMIN — MORPHINE SULFATE 2 MG: 2 INJECTION, SOLUTION INTRAMUSCULAR; INTRAVENOUS at 22:07

## 2017-09-28 RX ADMIN — INSULIN LISPRO 1 UNITS: 100 INJECTION, SOLUTION INTRAVENOUS; SUBCUTANEOUS at 21:42

## 2017-09-28 RX ADMIN — FENTANYL CITRATE 50 MCG: 50 INJECTION INTRAMUSCULAR; INTRAVENOUS at 12:22

## 2017-09-28 RX ADMIN — GABAPENTIN 300 MG: 300 CAPSULE ORAL at 21:42

## 2017-09-28 RX ADMIN — ALBUTEROL SULFATE 2.5 MG: 2.5 SOLUTION RESPIRATORY (INHALATION) at 17:30

## 2017-09-28 RX ADMIN — IODIXANOL 100 ML: 320 INJECTION, SOLUTION INTRAVASCULAR at 13:07

## 2017-09-28 RX ADMIN — DEXTROSE AND SODIUM CHLORIDE 75 ML/HR: 5; .9 INJECTION, SOLUTION INTRAVENOUS at 17:29

## 2017-09-28 RX ADMIN — PANTOPRAZOLE SODIUM 40 MG: 40 INJECTION, POWDER, FOR SOLUTION INTRAVENOUS at 21:42

## 2017-09-28 RX ADMIN — SODIUM CHLORIDE 125 ML/HR: 0.9 INJECTION, SOLUTION INTRAVENOUS at 12:22

## 2017-09-28 RX ADMIN — ONDANSETRON 4 MG: 2 INJECTION INTRAMUSCULAR; INTRAVENOUS at 12:22

## 2017-09-28 RX ADMIN — METOPROLOL TARTRATE 5 MG: 5 INJECTION INTRAVENOUS at 22:51

## 2017-09-28 RX ADMIN — METOPROLOL TARTRATE 5 MG: 5 INJECTION INTRAVENOUS at 17:30

## 2017-09-28 NOTE — ED NOTES
Spoke with hospital pharmacy hold on U 500 insulin till dosing clarified      Esther Desir, RN  09/28/17 2771

## 2017-09-28 NOTE — CONSULTS
Consultation - General Surgery   Jessee Lovelace  81 y o  male MRN: [id number]  Unit/Bed#: ED 13 Encounter: [id number]    Assessment/Plan     Assessment:  80 yo M with abdominal pain, nausea  - s/p NGT placement -- 200 cc out initially    Plan:  - NPO/NGT  - monitor abdominal exam  - trend leukocytosis  - analgesia  - rest of care per primary team    History of Present Illness     HPI:  Ginette Reina Sr  is a 81 y o  male who presents with acute onset abdominal pain for 2 days  Pain is located on the left lower quadrant  He has never had episodes of similar pain in the past   She reports mild nausea and poor appetite  He denies diarrhea or constipation  Last normal bowel movement was earlier today  He has not passed gas since then  He does report feeling bloated, and his stomach cm large per wife  He reports a past history of exploratory laparotomy, which per records was performed in September 2012 for sepsis  The ex lap was negative  He also had an appendectomy in the past and prostate surgery for bead implantation for prostate cancer  Consult to surgery general  Consult performed by: Gisela Menendez ordered by: Caprice REICH          Review of Systems   Constitutional: Positive for appetite change (poor appetitie)  Negative for activity change, chills, diaphoresis and unexpected weight change  HENT: Negative  Eyes: Negative  Respiratory: Negative  Cardiovascular: Negative  Gastrointestinal: Positive for abdominal distention and abdominal pain  Negative for anal bleeding, blood in stool, constipation, diarrhea, nausea, rectal pain and vomiting  Endocrine: Negative  Genitourinary: Negative  Musculoskeletal: Negative  Skin: Negative  Allergic/Immunologic: Negative  Neurological: Negative  Hematological: Negative  Psychiatric/Behavioral: Negative          Historical Information   Past Medical History:   Diagnosis Date    Cardiac disease     COPD (chronic obstructive pulmonary disease)     Coronary artery disease     Diabetes mellitus     Hyperlipidemia     Hypertension     MI (myocardial infarction)     with 3 stents    Prostate cancer      Past Surgical History:   Procedure Laterality Date    ABDOMINAL SURGERY      exploratory    PROSTATE SURGERY       Social History   History   Alcohol Use No     History   Drug Use No     History   Smoking Status    Former Smoker    Packs/day:    Smokeless Tobacco    Never Used     Comment: taking the patch currently     Family History:   Family History   Problem Relation Age of Onset    Heart disease Father        Meds/Allergies   PTA meds:   Prior to Admission Medications   Prescriptions Last Dose Informant Patient Reported? Taking? Omega-3 Fatty Acids (FISH OIL) 1200 MG CAPS  Self Yes No   Sig: Take 1,200 mg by mouth daily  acetaminophen (TYLENOL) 325 mg tablet   No No   Sig: Take 3 tablets by mouth every 8 (eight) hours   albuterol (2 5 mg/3 mL) 0 083 % nebulizer solution   Yes No   Sig: Take 2 5 mg by nebulization Indications: 2-3 times a day PRN  cyclobenzaprine (FLEXERIL) 10 mg tablet   Yes No   Sig: Take 10 mg by mouth 2 (two) times a day     insulin NPH-insulin regular (NovoLIN 70/30) 100 units/mL subcutaneous injection   Yes No   Sig: Inject 60 Units under the skin 3 (three) times a day before meals   insulin NPH-insulin regular (NovoLIN 70/30) 100 units/mL subcutaneous injection   Yes No   Sig: Inject 20 Units under the skin daily at bedtime With a snack   lisinopril (ZESTRIL) 40 mg tablet  Self Yes No   Sig: Take 40 mg by mouth daily  methocarbamol (ROBAXIN) 500 mg tablet   No No   Sig: Take 1 tablet by mouth every 6 (six) hours as needed for muscle spasms   methylprednisolone (MEDROL) 4 mg tablet   No No   Si tabs for a day, 3 tabs for a day, 2 tabs for a day, 1 tab for a day   metoprolol tartrate (LOPRESSOR) 25 mg tablet   Yes No   Sig: Take 25 mg by mouth daily  oxyCODONE-acetaminophen (PERCOCET) 7 5-325 MG per tablet   Yes No   Sig: Take 1 tablet by mouth 2 (two) times a day  simvastatin (ZOCOR) 40 mg tablet   Yes No   Sig: Take 40 mg by mouth daily at bedtime  Facility-Administered Medications: None     Allergies   Allergen Reactions    Metformin        Objective   First Vitals:   Blood Pressure: 161/86 (09/28/17 1117)  Pulse: 91 (09/28/17 1117)  Temperature: 97 5 °F (36 4 °C) (09/28/17 1117)  Temp Source: Tympanic (09/28/17 1117)  Respirations: 20 (09/28/17 1117)  Height: 6' (182 9 cm) (09/28/17 1117)  Weight - Scale: 127 kg (280 lb) (09/28/17 1117)  SpO2: (!) 75 % (09/28/17 1117)    Current Vitals:   Blood Pressure: 123/58 (09/28/17 1245)  Pulse: 84 (09/28/17 1245)  Temperature: 97 5 °F (36 4 °C) (09/28/17 1117)  Temp Source: Tympanic (09/28/17 1117)  Respirations: 18 (09/28/17 1245)  Height: 6' (182 9 cm) (09/28/17 1117)  Weight - Scale: 127 kg (280 lb) (09/28/17 1117)  SpO2: 93 % (09/28/17 1245)    No intake or output data in the 24 hours ending 09/28/17 1349    Invasive Devices     Peripheral Intravenous Line            Peripheral IV 09/28/17 Left Antecubital less than 1 day                Physical Exam   Constitutional: He is oriented to person, place, and time  He appears well-developed and well-nourished  HENT:   Head: Normocephalic and atraumatic  Eyes: Pupils are equal, round, and reactive to light  Neck: Normal range of motion  Cardiovascular: Normal rate and regular rhythm  Pulmonary/Chest: Effort normal and breath sounds normal    Abdominal: Soft  He exhibits distension (marked distention, improved after NGT placement)  He exhibits no mass  There is no tenderness  There is no rebound and no guarding  Musculoskeletal: Normal range of motion  Neurological: He is alert and oriented to person, place, and time  Skin: Skin is warm and dry  Lab Results:   I have personally reviewed pertinent lab results    , CBC:   Lab Results Component Value Date    WBC 15 16 (H) 09/28/2017    HGB 11 3 (L) 09/28/2017    HCT 37 8 09/28/2017    MCV 80 (L) 09/28/2017     09/28/2017    MCH 23 8 (L) 09/28/2017    MCHC 29 9 (L) 09/28/2017    RDW 16 8 (H) 09/28/2017    MPV 9 9 09/28/2017    NRBC 0 09/28/2017   , CMP:   Lab Results   Component Value Date     09/28/2017    K 5 3 09/28/2017     09/28/2017    CO2 26 09/28/2017    ANIONGAP 7 09/28/2017    BUN 51 (H) 09/28/2017    CREATININE 1 58 (H) 09/28/2017    GLUCOSE 246 (H) 09/28/2017    CALCIUM 9 2 09/28/2017    AST 20 09/28/2017    ALT 23 09/28/2017    ALKPHOS 75 09/28/2017    PROT 7 5 09/28/2017    ALBUMIN 2 8 (L) 09/28/2017    BILITOT 0 35 09/28/2017    EGFR 44 09/28/2017   , Coagulation:   Lab Results   Component Value Date    INR 1 11 09/28/2017   , Urinalysis: No results found for: [place], SPECGRAV, PHUR, LEUKOCYTESUR, NITRITE, PROTEINUA, GLUCOSEU, KETONESU, BILIRUBINUR, BLOODU  Imaging: I have personally reviewed pertinent reports  and I have personally reviewed pertinent films in PACS   Xr Chest Pa & Lateral    Result Date: 9/28/2017  Impression: Small right pleural effusion  Workstation performed: [id number]     Ct Abdomen Pelvis With Contrast    Result Date: 9/28/2017  Impression: The duodenum has an inflamed appearance suggesting acute duodenitis  The stomach is quite distended with gas and fluid  Could not entirely exclude the possibility of at least a partial gastric outlet obstruction  Correlate for p o  tolerance  There is also a duodenal diverticulum  Gallstones or sludge without evidence of acute cholecystitis  Small right pleural effusion  Stable minimal lung base nodularity  Slightly increased interstitial prominence of the right lung  Correlate for any pulmonary symptoms  ##imslh##imslh Workstation performed: [id number]     EKG, Pathology, and Other Studies: I have personally reviewed pertinent reports     and I have personally reviewed pertinent films in PACS    Counseling / Coordination of Care  Total floor / unit time spent today 30 minutes  Greater than 50% of total time was spent with the patient and / or family counseling and / or coordination of care

## 2017-09-28 NOTE — H&P
History and Physical - [facility] Internal Medicine    Patient Information: Marce Calzada  81 y o  male MRN: [id number]  Unit/Bed#: ED 13 Encounter: [id number]  Admitting Physician: Melida Morin MD  PCP: Kimberly Joseph MD  Date of Admission:  09/28/17    Assessment/Plan:    Hospital Problem List:     Principal Problem:    Partial gastric outlet obstruction  Active Problems:    Coronary artery disease involving native coronary artery without angina pectoris    Gastroesophageal reflux disease without esophagitis    Obesity (BMI 30-39  9)    Dyslipidemia    Type 2 diabetes mellitus with hyperglycemia    Chronic respiratory failure with hypoxia    COPD (chronic obstructive pulmonary disease)    CKD (chronic kidney disease)    Duodenitis    Present on Admission:   Coronary artery disease involving native coronary artery without angina pectoris   Gastroesophageal reflux disease without esophagitis   Obesity (BMI 30-39  9)   Dyslipidemia   Chronic respiratory failure with hypoxia   COPD (chronic obstructive pulmonary disease)   CKD (chronic kidney disease)   Type 2 diabetes mellitus with hyperglycemia   Partial gastric outlet obstruction   Duodenitis      Plan for the Primary Problem(s):  · Partial gastric outlet obstruction and duodenitis  · Seen by general surgery and NGT tube placed, analgesics as needed  · Antiemetics as needed  · Bid ppi  · GI consult for possible endoscopy  · NPO    Plan for Additional Problems:   · GERD: not on medications as OP, starting protonix bid  · CAD: no complaint of chest discomfort, initial troponin 0 09 in setting of MARANDA, dehydration, will continue to trend, beta blocker therapy IV, check echo, cardiology evaluation given cardiac hx  · MARANDA: clinically appears dehydrated: IVFs, repeat bmp in am, check UA, if no improvement in creatine, consider renal US, hold nephrotoxic agents  · IDDM: on U-500 as OP will hold given pt will be NPO, SSI, endocrine eval, monitor BS closely, check hgb a1c  · COPD: resume bronchodilators as taking as OP, will need home nebulizer on discharge, pt has not gotten neb since last d/c, currently off steroids, continue supplemental oxygen, wife reports pt taking spiriva, per pharmacy it hasnt been filled in a couple months, ? compliance  · Obesity: weight loss encourage, lifestyle modification    VTE Prophylaxis: Enoxaparin (Lovenox)  / sequential compression device   Code Status: full  POLST: There is no POLST form on file for this patient (pre-hospital)    Anticipated Length of Stay:  Patient will be admitted on an Inpatient basis with an anticipated length of stay of  greater 2 midnights  Justification for Hospital Stay: mgt partial gastric outlet obstruction, pt npo, ngt    Total Time for Visit, including Counseling / Coordination of Care: 45 minutes  Greater than 50% of this total time spent on direct patient counseling and coordination of care  Chief Complaint:   Abdominal pain and sitrention x 3 days    History of Present Illness:    Denise Holm Sr  is a 81 y o  male who presents with 3 day history of abdominal pain and distention  Patient also with increased belching over past several days as well  He has chronic acid reflux and not on medications for this  He has daily bowel movements, last this am, normal  He is scheduled to see GI tomorrow for heme positive dark stools, no grossly bloody BM  No chest pain, some SOB, no LH or palpitations  Review of Systems:  No fevers, chills  No chest pain, LH  Chronic SOB, nonproductive cough, occ wheeze    All systems are reviewed  Positive as per history of presenting illness  Patient answered no to all other questions         Past Medical and Surgical History:     Past Medical History:   Diagnosis Date    Cardiac disease     COPD (chronic obstructive pulmonary disease)     Coronary artery disease     Diabetes mellitus     Hyperlipidemia     Hypertension     MI (myocardial infarction) with 3 stents    Prostate cancer        Past Surgical History:   Procedure Laterality Date    ABDOMINAL SURGERY      exploratory    PROSTATE SURGERY         Meds/Allergies:    Prior to Admission medications    Medication Sig Start Date End Date Taking? Authorizing Provider   acetaminophen (TYLENOL) 325 mg tablet Take 3 tablets by mouth every 8 (eight) hours 7/24/17  Yes Marisol Gerardo DO   albuterol (2 5 mg/3 mL) 0 083 % nebulizer solution Take 2 5 mg by nebulization Indications: 2-3 times a day PRN  Yes Historical Provider, MD   cyclobenzaprine (FLEXERIL) 10 mg tablet Take 10 mg by mouth 2 (two) times a day     Yes Historical Provider, MD   gabapentin (NEURONTIN) 300 mg capsule Take 300 mg by mouth daily at bedtime     Yes Historical Provider, MD   insulin regular (HUMULIN R) 500 units/mL CONCENTRATED injection Inject under the skin 3 (three) times a day before meals 55units in am, 55units at noon and 45units in the evening sub q   Yes Historical Provider, MD   lisinopril (ZESTRIL) 40 mg tablet Take 40 mg by mouth daily  Yes Historical Provider, MD   metoprolol tartrate (LOPRESSOR) 25 mg tablet Take 25 mg by mouth 2 (two) times a day     Yes Historical Provider, MD   Omega-3 Fatty Acids (FISH OIL) 1200 MG CAPS Take 1,200 mg by mouth daily  Yes Historical Provider, MD   oxyCODONE-acetaminophen (PERCOCET) 7 5-325 MG per tablet Take 1 tablet by mouth 2 (two) times a day  Yes Historical Provider, MD   simvastatin (ZOCOR) 40 mg tablet Take 40 mg by mouth daily at bedtime     Yes Historical Provider, MD   tiotropium (SPIRIVA) 18 mcg inhalation capsule Place 18 mcg into inhaler and inhale daily   Yes Historical Provider, MD   methocarbamol (ROBAXIN) 500 mg tablet Take 1 tablet by mouth every 6 (six) hours as needed for muscle spasms 7/24/17 9/28/17 Yes Mairsol Gerardo DO   methylprednisolone (MEDROL) 4 mg tablet 4 tabs for a day, 3 tabs for a day, 2 tabs for a day, 1 tab for a day 7/21/17 9/28/17 Yes Davin Limb, MD   glipiZIDE (GLUCOTROL) 5 mg tablet Take 5 mg by mouth 2 (two) times a day before meals Per patients pharmancy they are unsure if pt still taking he has not had prescription filled in a while and family did not state if patient takes med currently    Historical Provider, MD   insulin NPH-insulin regular (NovoLIN 70/30) 100 units/mL subcutaneous injection Inject 60 Units under the skin 3 (three) times a day before meals  9/28/17  Historical Provider, MD   insulin NPH-insulin regular (NovoLIN 70/30) 100 units/mL subcutaneous injection Inject 20 Units under the skin daily at bedtime With a snack  9/28/17  Historical Provider, MD     I have reviewed home medications using allscripts  Allergies: Allergies   Allergen Reactions    Metformin        Social History:     Marital Status: /Civil Union   Occupation: retired  Patient Pre-hospital Living Situation: wife  Patient Pre-hospital Level of Mobility: independent  Patient Pre-hospital Diet Restrictions: cardiac-diabetic  Substance Use History:   History   Alcohol Use No     History   Smoking Status    Former Smoker    Packs/day: 1 00   Smokeless Tobacco    Never Used     Comment: taking the patch currently     History   Drug Use No       Family History:    non-contributory      Physical Exam:  Vitals:   Blood Pressure: 123/57 (09/28/17 1715)  Pulse: 86 (09/28/17 1715)  Temperature: 97 5 °F (36 4 °C) (09/28/17 1117)  Temp Source: Tympanic (09/28/17 1117)  Respirations: 17 (09/28/17 1715)  Height: 6' (182 9 cm) (09/28/17 1117)  Weight - Scale: 127 kg (280 lb) (09/28/17 1117)  SpO2: 96 % (09/28/17 1734)    Vital signs are reviewed as above  Sleeping but arousable (received morphine)  Sclera anicteric, +NGT  RRR  Decreased air exchange 2/2 effort  Soft, hypoactive BS, obese  B/l LE edema  Calm and cooperative  No rash  Moves all limbs, grossly nonfocal            Additional Data:     Lab Results: I have personally reviewed pertinent reports          Results from last 7 days  Lab Units 09/28/17  1730 09/28/17  1207   WBC Thousand/uL  --  15 16*   HEMOGLOBIN g/dL  --  11 3*   HEMATOCRIT %  --  37 8   PLATELETS Thousands/uL 239 268   NEUTROS PCT %  --  87*   LYMPHS PCT %  --  8*   MONOS PCT %  --  5   EOS PCT %  --  0       Results from last 7 days  Lab Units 09/28/17  1207   SODIUM mmol/L 138   POTASSIUM mmol/L 5 3   CHLORIDE mmol/L 105   CO2 mmol/L 26   BUN mg/dL 51*   CREATININE mg/dL 1 58*   CALCIUM mg/dL 9 2   TOTAL PROTEIN g/dL 7 5   BILIRUBIN TOTAL mg/dL 0 35   ALK PHOS U/L 75   ALT U/L 23   AST U/L 20   GLUCOSE RANDOM mg/dL 246*       Results from last 7 days  Lab Units 09/28/17  1207   INR  1 11       Imaging: I have personally reviewed pertinent reports  Xr Chest Pa & Lateral    Result Date: 9/28/2017  Narrative: CHEST 2 View INDICATION:  Shortness of breath and cough for 2 days  COMPARISON:  7/16/2017 VIEWS:  PA and lateral projections; 2 images FINDINGS:     Cardiomediastinal silhouette appears unremarkable  The lungs are clear  No pneumothorax  Small right pleural effusion  Visualized osseous structures appear within normal limits for the patient's age  Impression: Small right pleural effusion  Workstation performed: [id number]     Ct Abdomen Pelvis With Contrast    Result Date: 9/28/2017  Narrative: CT ABDOMEN AND PELVIS WITH IV CONTRAST INDICATION:  Generalized abdominal pain starting yesterday  COMPARISON: July 23, 2017 TECHNIQUE:  CT examination of the abdomen and pelvis was performed  Reformatted images were created in axial, sagittal, and coronal planes  Radiation dose length product (DLP) for this visit:  1432 98 mGy-cm   This examination, like all CT scans performed in the [facility], was performed utilizing techniques to minimize radiation dose exposure, including the use of iterative reconstruction and automated exposure control   IV Contrast:  100 mL of iodixanol (VISIPAQUE)     Enteric Contrast:  Enteric contrast was not administered  FINDINGS: ABDOMEN LOWER CHEST:  There is minimal right pleural effusion which is new  There are a few tiny lung base nodules which are stable  There is mild interstitial prominence in the right middle and lower lobes which is increased from prior  Correlate for any pulmonary symptoms  LIVER/BILIARY TREE:  Unremarkable  GALLBLADDER:  Sludge or tiny gallstones are visible in the gallbladder  No evidence of acute cholecystitis  SPLEEN:  Unremarkable  PANCREAS:  Unremarkable  ADRENAL GLANDS:  Unremarkable  KIDNEYS/URETERS:  Unremarkable  No hydronephrosis  STOMACH AND BOWEL:  There is a duodenal diverticulum  The wall of the duodenum appears minimally thickened and there seems to be some enhancement of the mucosa in this region  This is new since the previous exam and may indicate duodenitis  The stomach is very distended with gas and fluid  The remainder of the bowel is unremarkable  APPENDIX:  No findings to suggest appendicitis  ABDOMINOPELVIC CAVITY:  No ascites or free intraperitoneal air  No lymphadenopathy  VESSELS:  Atherosclerotic changes are present  No evidence of aneurysm  PELVIS REPRODUCTIVE ORGANS:  Radiation seeds in the prostate  URINARY BLADDER:  Unremarkable  ABDOMINAL WALL/INGUINAL REGIONS:  Small fat-containing umbilical hernia noted  OSSEOUS STRUCTURES:  There is a chronic appearing fractures of the right 11th and 12th ribs  Impression: The duodenum has an inflamed appearance suggesting acute duodenitis  The stomach is quite distended with gas and fluid  Could not entirely exclude the possibility of at least a partial gastric outlet obstruction  Correlate for p o  tolerance  There is also a duodenal diverticulum  Gallstones or sludge without evidence of acute cholecystitis  Small right pleural effusion  Stable minimal lung base nodularity  Slightly increased interstitial prominence of the right lung  Correlate for any pulmonary symptoms   ##imslh##imslh Workstation performed: [id number]       EKG, Pathology, and Other Studies Reviewed on Admission:   · EKG: RBBB, unchanged    Allscripts Records Reviewed: Yes     ** Please Note: Dragon 360 Dictation voice to text software may have been used in the creation of this document   **

## 2017-09-28 NOTE — ED PROVIDER NOTES
History  Chief Complaint   Patient presents with    Abdominal Pain     Pt complaining of general abdominal pain that started yesterday  Denies any nausea, vomiting, diarrah, fevers  81year old male presents for evaluation of diffuse, severe crampy/ aching abdominal pain which onset 2 days ago and has been progressively worsening  No obvious alleviating or exacerbating factors  No nausea or vomiting but states decreased p o  intake otherwise  States that his mouth feels dry and feels dehydrated  No home remedies or symptom management  Patient does have baseline COPD and does wear home oxygen 3 liters/minute flow, does continue to smoke intermittently  Patient states that he has not had increased dyspnea over his baseline but states that he has had a cough productive of yellow sputum  No fever chills  No chest pain, dizziness, lightheadedness, flank pain, urinary symptoms  States that he has had a recent fecal occult blood test positive but denies any hematochezia or melena  No change in baseline edema or swelling  Prior to Admission Medications   Prescriptions Last Dose Informant Patient Reported? Taking? Omega-3 Fatty Acids (FISH OIL) 1200 MG CAPS 9/27/2017 at Unknown time Self Yes Yes   Sig: Take 1,200 mg by mouth daily  acetaminophen (TYLENOL) 325 mg tablet 9/27/2017 at Unknown time  No Yes   Sig: Take 3 tablets by mouth every 8 (eight) hours   albuterol (2 5 mg/3 mL) 0 083 % nebulizer solution 9/27/2017 at Unknown time  Yes Yes   Sig: Take 2 5 mg by nebulization Indications: 2-3 times a day PRN     cyclobenzaprine (FLEXERIL) 10 mg tablet 9/27/2017 at Unknown time  Yes Yes   Sig: Take 10 mg by mouth 2 (two) times a day     insulin regular (HUMULIN R) 500 units/mL CONCENTRATED injection 9/27/2017 at Unknown time  Yes Yes   Sig: Inject under the skin 3 (three) times a day before meals 55units in am, 55units at noon and 45units in the evening sub q   lisinopril (ZESTRIL) 40 mg tablet 2017 at Unknown time Self Yes Yes   Sig: Take 40 mg by mouth daily  methocarbamol (ROBAXIN) 500 mg tablet 2017 at Unknown time  No Yes   Sig: Take 1 tablet by mouth every 6 (six) hours as needed for muscle spasms   methylprednisolone (MEDROL) 4 mg tablet 2017 at Unknown time  No Yes   Si tabs for a day, 3 tabs for a day, 2 tabs for a day, 1 tab for a day   metoprolol tartrate (LOPRESSOR) 25 mg tablet 2017 at Unknown time  Yes Yes   Sig: Take 25 mg by mouth daily  oxyCODONE-acetaminophen (PERCOCET) 7 5-325 MG per tablet 2017 at Unknown time  Yes Yes   Sig: Take 1 tablet by mouth 2 (two) times a day  simvastatin (ZOCOR) 40 mg tablet 2017 at Unknown time  Yes Yes   Sig: Take 40 mg by mouth daily at bedtime  Facility-Administered Medications: None       Past Medical History:   Diagnosis Date    Cardiac disease     COPD (chronic obstructive pulmonary disease)     Coronary artery disease     Diabetes mellitus     Hyperlipidemia     Hypertension     MI (myocardial infarction)     with 3 stents    Prostate cancer        Past Surgical History:   Procedure Laterality Date    ABDOMINAL SURGERY      exploratory    PROSTATE SURGERY         Family History   Problem Relation Age of Onset    Heart disease Father      I have reviewed and agree with the history as documented  Social History   Substance Use Topics    Smoking status: Former Smoker     Packs/day: 1 00    Smokeless tobacco: Never Used      Comment: taking the patch currently    Alcohol use No        Review of Systems   Constitutional: Positive for appetite change  Negative for chills, diaphoresis, fatigue, fever and unexpected weight change  HENT: Negative for sore throat and trouble swallowing  Eyes: Negative for photophobia and visual disturbance  Respiratory: Positive for cough  Negative for choking, chest tightness, shortness of breath and wheezing      Cardiovascular: Negative for chest pain, palpitations and leg swelling  Gastrointestinal: Positive for abdominal pain  Negative for constipation, diarrhea, nausea and vomiting  Endocrine: Negative for cold intolerance, heat intolerance, polydipsia, polyphagia and polyuria  Genitourinary: Negative for dysuria, flank pain, penile pain, penile swelling and testicular pain  Musculoskeletal: Negative for back pain, myalgias and neck pain  Skin: Negative for pallor, rash and wound  Neurological: Negative for weakness, numbness and headaches  All other systems reviewed and are negative  Physical Exam  ED Triage Vitals [09/28/17 1117]   Temperature Pulse Respirations Blood Pressure SpO2   97 5 °F (36 4 °C) 91 20 161/86 (!) 75 %      Temp Source Heart Rate Source Patient Position - Orthostatic VS BP Location FiO2 (%)   Tympanic Monitor Sitting Left arm --      Pain Score       6           Physical Exam   Constitutional: He appears well-developed and well-nourished  No distress  HENT:   Head: Normocephalic and atraumatic  Right Ear: External ear normal    Left Ear: External ear normal    Nose: Nose normal    Mouth/Throat: Mucous membranes are dry  No oropharyngeal exudate  Eyes: Conjunctivae and EOM are normal  Pupils are equal, round, and reactive to light  Right eye exhibits no discharge  Left eye exhibits no discharge  No scleral icterus  Neck: Normal range of motion  Neck supple  No JVD present  No tracheal deviation present  No thyromegaly present  Cardiovascular: Normal rate, regular rhythm, normal heart sounds and intact distal pulses  Pulmonary/Chest: No stridor  He is in respiratory distress  He has decreased breath sounds  He has wheezes  He has no rhonchi  He has rales  He exhibits no tenderness  Abdominal: Soft  He exhibits no distension and no mass  There is generalized tenderness  There is no rigidity, no rebound, no guarding, no CVA tenderness, no tenderness at McBurney's point and negative Dee's sign  No hernia  Hernia confirmed negative in the right inguinal area and confirmed negative in the left inguinal area  Genitourinary: Testes normal and penis normal  Right testis shows no swelling and no tenderness  Left testis shows no swelling and no tenderness  No penile tenderness  Genitourinary Comments: No erythema or crepitus  Talcum powder present in her undergarments  Musculoskeletal: Normal range of motion  He exhibits no edema, tenderness or deformity  Lymphadenopathy:     He has no cervical adenopathy  Skin: Skin is warm and dry  No rash noted  He is not diaphoretic  No erythema  No pallor  Nursing note and vitals reviewed        ED Medications  Medications   sodium chloride 0 9 % infusion (125 mL/hr Intravenous New Bag 9/28/17 1222)   ondansetron (ZOFRAN) injection 4 mg (4 mg Intravenous Given 9/28/17 1222)   fentanyl citrate (PF) 100 MCG/2ML 50 mcg (50 mcg Intravenous Given 9/28/17 1222)   ipratropium-albuterol (DUO-NEB) 0 5-2 5 mg/mL inhalation solution 3 mL (3 mL Nebulization Given 9/28/17 1222)   iodixanol (VISIPAQUE) 320 MG/ML injection 100 mL (100 mL Intravenous Given 9/28/17 1307)       Diagnostic Studies  Labs Reviewed   CBC AND DIFFERENTIAL - Abnormal        Result Value Ref Range Status    WBC 15 16 (*) 4 31 - 10 16 Thousand/uL Final    Hemoglobin 11 3 (*) 12 0 - 17 0 g/dL Final    MCV 80 (*) 82 - 98 fL Final    MCH 23 8 (*) 26 8 - 34 3 pg Final    MCHC 29 9 (*) 31 4 - 37 4 g/dL Final    RDW 16 8 (*) 11 6 - 15 1 % Final    Neutrophils Relative 87 (*) 43 - 75 % Final    Lymphocytes Relative 8 (*) 14 - 44 % Final    Neutrophils Absolute 13 25 (*) 1 85 - 7 62 Thousands/µL Final    RBC 4 74  3 88 - 5 62 Million/uL Final    Hematocrit 37 8  36 5 - 49 3 % Final    MPV 9 9  8 9 - 12 7 fL Final    Platelets 230  035 - 390 Thousands/uL Final    nRBC 0  /100 WBCs Final    Monocytes Relative 5  4 - 12 % Final    Eosinophils Relative 0  0 - 6 % Final    Basophils Relative 0  0 - 1 % Final    Lymphocytes Absolute 1 14  0 60 - 4 47 Thousands/µL Final    Monocytes Absolute 0 70  0 17 - 1 22 Thousand/µL Final    Eosinophils Absolute 0 01  0 00 - 0 61 Thousand/µL Final    Basophils Absolute 0 01  0 00 - 0 10 Thousands/µL Final   COMPREHENSIVE METABOLIC PANEL - Abnormal     BUN 51 (*) 5 - 25 mg/dL Final    Creatinine 1 58 (*) 0 60 - 1 30 mg/dL Final    Comment: Standardized to IDMS reference method    Glucose 246 (*) 65 - 140 mg/dL Final    Comment:   If the patient is fasting, the ADA then defines impaired fasting glucose as > 100 mg/dL and diabetes as > or equal to 123 mg/dL  Specimen collection should occur prior to Sulfasalazine administration due to the potential for falsely depressed results  Specimen collection should occur prior to Sulfapyridine administration due to the potential for falsely elevated results  Albumin 2 8 (*) 3 5 - 5 0 g/dL Final    Sodium 138  136 - 145 mmol/L Final    Potassium 5 3  3 5 - 5 3 mmol/L Final    Chloride 105  100 - 108 mmol/L Final    CO2 26  21 - 32 mmol/L Final    Anion Gap 7  4 - 13 mmol/L Final    Calcium 9 2  8 3 - 10 1 mg/dL Final    AST 20  5 - 45 U/L Final    Comment:   Specimen collection should occur prior to Sulfasalazine administration due to the potential for falsely depressed results  ALT 23  12 - 78 U/L Final    Comment:   Specimen collection should occur prior to Sulfasalazine and/or Sulfapyridine administration due to the potential for falsely depressed results  Alkaline Phosphatase 75  46 - 116 U/L Final    Total Protein 7 5  6 4 - 8 2 g/dL Final    Total Bilirubin 0 35  0 20 - 1 00 mg/dL Final    eGFR 44  ml/min/1 73sq m Final    Narrative:     National Kidney Disease Education Program recommendations are as follows:  GFR calculation is accurate only with a steady state creatinine  Chronic Kidney disease less than 60 ml/min/1 73 sq  meters  Kidney failure less than 15 ml/min/1 73 sq  meters     NT-BNP PRO (BRAIN NATRIURETIC PEPTIDE) - Abnormal NT-proBNP 3,091 (*) <125 pg/mL Final   POCT TROPONIN - Abnormal     POC Troponin I 0 09 (*) 0 00 - 0 08 ng/ml Final    Specimen Type VENOUS   Final    Narrative:     [employer] i-Stat handheld analyzer 99% cutoff is > 0 08ng/mL in [facility] Emergency Departments    o cTnI 99% cutoff is useful only when applied to patients in the clinical setting of myocardial ischemia  o cTnI 99% cutoff should be interpreted in the context of clinical history, ECG findings and possibly cardiac imaging to establish correct diagnosis  o cTnI 99% cutoff may be suggestive but clearly not indicative of a coronary event without the clinical setting of myocardial ischemia  PROTIME-INR - Normal    Protime 14 3  12 1 - 14 4 seconds Final    INR 1 11  0 86 - 1 16 Final   APTT - Normal    PTT 30  23 - 35 seconds Final    Narrative: Therapeutic Heparin Range = 60-90 seconds   LIPASE - Normal    Lipase 89  73 - 393 u/L Final   POCT URINALYSIS DIPSTICK       XR chest pa & lateral   Final Result      Small right pleural effusion  Workstation performed: [id number]         CT abdomen pelvis with contrast   Final Result      The duodenum has an inflamed appearance suggesting acute duodenitis  The stomach is quite distended with gas and fluid  Could not entirely exclude the possibility of at least a partial gastric outlet obstruction  Correlate for p o  tolerance  There is also a duodenal diverticulum  Gallstones or sludge without evidence of acute cholecystitis  Small right pleural effusion  Stable minimal lung base nodularity  Slightly increased interstitial prominence of the right lung  Correlate for any pulmonary symptoms           ##imslh##imslh         Workstation performed: [id number]             Procedures  ECG 12 Lead Documentation  Date/Time: 9/28/2017 12:22 PM  Performed by: Reyna Nunn  Authorized by: Cliff REICH     Indications / Diagnosis:  Dyspnea, hypoxia, abdominal pain  ECG reviewed by me, the ED Provider: yes    Patient location:  ED  Previous ECG:     Previous ECG:  Compared to current    Comparison ECG info:  7/16/2017    Similarity:  No change    Comparison to cardiac monitor: Yes    Interpretation:     Interpretation: normal    Rate:     ECG rate:  94    ECG rate assessment: normal    Rhythm:     Rhythm: sinus rhythm    Ectopy:     Ectopy: none    QRS:     QRS axis:  Normal  Conduction:     Conduction: abnormal      Abnormal conduction: incomplete RBBB    ST segments:     ST segments:  Non-specific  T waves:     T waves: non-specific            Phone Contacts  ED Phone Contact    ED Course  ED Course            Identification of Seniors at [address] [place] [address] Most Recent Value   (ISAR) Identification of Seniors at Risk   Before the illness or injury that brought you to the Emergency, did you need someone to help you on a regular basis? 1 Filed at: 09/28/2017 1118   In the last 24 hours, have you needed more help than usual?  1 Filed at: 09/28/2017 1118   Have you been hospitalized for one or more nights during the past 6 months? 1 Filed at: 09/28/2017 1118   In general, do you see well?  0 Filed at: 09/28/2017 1118   In general, do you have serious problems with your memory? 1 Filed at: 09/28/2017 1118   Do you take more than three different medications every day? 1 Filed at: 09/28/2017 1118   ISAR Score  5 Filed at: 09/28/2017 1118                          MDM  Number of Diagnoses or Management Options  Diagnosis management comments: 74-year-old male with multiple medical problems presents for evaluation of progressively worsening 2 days of diffuse crampy abdominal pain as well as cough productive of yellow sputum  Otherwise asymptomatic  We will check basic labs, chest x-ray, CT of the abdomen and pelvis, IV fluids, nebulizer therapy, antibiotics as indicated, disposition as appropriate         Amount and/or Complexity of Data Reviewed  Clinical lab tests: ordered and reviewed  Tests in the radiology section of CPT®: ordered and reviewed      CritCare Time    Disposition  Final diagnoses:   Abdominal pain   Duodenitis   Acute renal failure   COPD exacerbation     ED Disposition     ED Disposition Condition Comment    Admit  Case was discussed with Dr Karlee Sheth and the patient's admission status was agreed to be Admission Status: inpatient status to the service of Dr Sagrario Mitchell   Follow-up Information    None       Patient's Medications   Discharge Prescriptions    No medications on file     No discharge procedures on file      ED Provider  Electronically Signed by       Toney Iraheta,   09/28/17 Bal Myers, DO  09/28/17 6494

## 2017-09-28 NOTE — ED NOTES
Spoke with MD Marsha Domínguez about med list will review updated MAR and to hold on Humulin R u-500       Po Perez RN  09/28/17 2677

## 2017-09-28 NOTE — ED RE-EVALUATION NOTE
Imaging reviewed  General surgery consulted for further evaluation  Labs reviewed  Will continue with IV fluids for acute kidney injury and nebulizer therapy as appropriate  Patient to be admitted

## 2017-09-28 NOTE — ED NOTES
Spoke with pts home pharmacy [facility]   Reviewed med list with pharmacist and updated pts MD Awilda KWONG paged to be made aware of update, also paged to make MD aware pt was using Glipizde per pharmacy but family did not mention if patient was taking  Awaiting call back       Hoda Loaiza RN  09/28/17 3230

## 2017-09-28 NOTE — ED NOTES
MD Payne called to bedside to see patient- pt some increased WINTERS and SOB during triage, pt wears home O2 and was not on- placed on 4 liters of O2 in room       Jt Pineda RN  09/28/17 9102

## 2017-09-28 NOTE — ED RE-EVALUATION NOTE
Patient evaluated by surgery an NG tube placed  Will admit patient to medicine service  Surgery to follow

## 2017-09-28 NOTE — ED NOTES
Wife states that his blood glucose has not been controled and that he hasn't really been eating at home        Josselin Fisher, BEATRICE  09/28/17 5451

## 2017-09-29 ENCOUNTER — APPOINTMENT (INPATIENT)
Dept: NON INVASIVE DIAGNOSTICS | Facility: HOSPITAL | Age: 72
DRG: 380 | End: 2017-09-29
Payer: COMMERCIAL

## 2017-09-29 ENCOUNTER — GENERIC CONVERSION - ENCOUNTER (OUTPATIENT)
Dept: OTHER | Facility: OTHER | Age: 72
End: 2017-09-29

## 2017-09-29 LAB
ANION GAP SERPL CALCULATED.3IONS-SCNC: 6 MMOL/L (ref 4–13)
BUN SERPL-MCNC: 51 MG/DL (ref 5–25)
CALCIUM SERPL-MCNC: 9.1 MG/DL (ref 8.3–10.1)
CHLORIDE SERPL-SCNC: 106 MMOL/L (ref 100–108)
CO2 SERPL-SCNC: 29 MMOL/L (ref 21–32)
CREAT SERPL-MCNC: 1.49 MG/DL (ref 0.6–1.3)
ERYTHROCYTE [DISTWIDTH] IN BLOOD BY AUTOMATED COUNT: 16.8 % (ref 11.6–15.1)
EST. AVERAGE GLUCOSE BLD GHB EST-MCNC: 166 MG/DL
GFR SERPL CREATININE-BSD FRML MDRD: 47 ML/MIN/1.73SQ M
GLUCOSE SERPL-MCNC: 172 MG/DL (ref 65–140)
GLUCOSE SERPL-MCNC: 181 MG/DL (ref 65–140)
GLUCOSE SERPL-MCNC: 183 MG/DL (ref 65–140)
GLUCOSE SERPL-MCNC: 236 MG/DL (ref 65–140)
GLUCOSE SERPL-MCNC: 251 MG/DL (ref 65–140)
GLUCOSE SERPL-MCNC: 254 MG/DL (ref 65–140)
HBA1C MFR BLD: 7.4 % (ref 4.2–6.3)
HCT VFR BLD AUTO: 37.6 % (ref 36.5–49.3)
HCT VFR BLD AUTO: 38.9 % (ref 36.5–49.3)
HGB BLD-MCNC: 11.1 G/DL (ref 12–17)
HGB BLD-MCNC: 11.2 G/DL (ref 12–17)
MAGNESIUM SERPL-MCNC: 2.8 MG/DL (ref 1.6–2.6)
MCH RBC QN AUTO: 23.3 PG (ref 26.8–34.3)
MCHC RBC AUTO-ENTMCNC: 28.8 G/DL (ref 31.4–37.4)
MCV RBC AUTO: 81 FL (ref 82–98)
PLATELET # BLD AUTO: 230 THOUSANDS/UL (ref 149–390)
PMV BLD AUTO: 9.9 FL (ref 8.9–12.7)
POTASSIUM SERPL-SCNC: 5.1 MMOL/L (ref 3.5–5.3)
RBC # BLD AUTO: 4.8 MILLION/UL (ref 3.88–5.62)
SODIUM SERPL-SCNC: 141 MMOL/L (ref 136–145)
WBC # BLD AUTO: 13.13 THOUSAND/UL (ref 4.31–10.16)

## 2017-09-29 PROCEDURE — 93306 TTE W/DOPPLER COMPLETE: CPT

## 2017-09-29 PROCEDURE — 80048 BASIC METABOLIC PNL TOTAL CA: CPT | Performed by: HOSPITALIST

## 2017-09-29 PROCEDURE — C9113 INJ PANTOPRAZOLE SODIUM, VIA: HCPCS | Performed by: HOSPITALIST

## 2017-09-29 PROCEDURE — 82948 REAGENT STRIP/BLOOD GLUCOSE: CPT

## 2017-09-29 PROCEDURE — 85014 HEMATOCRIT: CPT | Performed by: PHYSICIAN ASSISTANT

## 2017-09-29 PROCEDURE — 83036 HEMOGLOBIN GLYCOSYLATED A1C: CPT | Performed by: HOSPITALIST

## 2017-09-29 PROCEDURE — 85027 COMPLETE CBC AUTOMATED: CPT | Performed by: HOSPITALIST

## 2017-09-29 PROCEDURE — 83735 ASSAY OF MAGNESIUM: CPT | Performed by: HOSPITALIST

## 2017-09-29 PROCEDURE — 85018 HEMOGLOBIN: CPT | Performed by: PHYSICIAN ASSISTANT

## 2017-09-29 PROCEDURE — 94760 N-INVAS EAR/PLS OXIMETRY 1: CPT

## 2017-09-29 RX ORDER — GABAPENTIN 300 MG/1
300 CAPSULE ORAL
Status: DISCONTINUED | OUTPATIENT
Start: 2017-09-29 | End: 2017-10-05 | Stop reason: HOSPADM

## 2017-09-29 RX ORDER — LIDOCAINE 50 MG/G
1 PATCH TOPICAL DAILY
Status: DISCONTINUED | OUTPATIENT
Start: 2017-09-29 | End: 2017-10-05 | Stop reason: HOSPADM

## 2017-09-29 RX ADMIN — INSULIN LISPRO 2 UNITS: 100 INJECTION, SOLUTION INTRAVENOUS; SUBCUTANEOUS at 17:09

## 2017-09-29 RX ADMIN — CYCLOBENZAPRINE HYDROCHLORIDE 10 MG: 10 TABLET, FILM COATED ORAL at 21:04

## 2017-09-29 RX ADMIN — TIOTROPIUM BROMIDE 18 MCG: 18 CAPSULE ORAL; RESPIRATORY (INHALATION) at 21:07

## 2017-09-29 RX ADMIN — DEXTROSE AND SODIUM CHLORIDE 75 ML/HR: 5; .9 INJECTION, SOLUTION INTRAVENOUS at 02:57

## 2017-09-29 RX ADMIN — INSULIN LISPRO 1 UNITS: 100 INJECTION, SOLUTION INTRAVENOUS; SUBCUTANEOUS at 12:35

## 2017-09-29 RX ADMIN — Medication 2 SPRAY: at 21:09

## 2017-09-29 RX ADMIN — GABAPENTIN 300 MG: 300 CAPSULE ORAL at 21:04

## 2017-09-29 RX ADMIN — MORPHINE SULFATE 2 MG: 2 INJECTION, SOLUTION INTRAMUSCULAR; INTRAVENOUS at 20:46

## 2017-09-29 RX ADMIN — MORPHINE SULFATE 2 MG: 2 INJECTION, SOLUTION INTRAMUSCULAR; INTRAVENOUS at 10:28

## 2017-09-29 RX ADMIN — INSULIN LISPRO 1 UNITS: 100 INJECTION, SOLUTION INTRAVENOUS; SUBCUTANEOUS at 06:14

## 2017-09-29 RX ADMIN — METOPROLOL TARTRATE 5 MG: 5 INJECTION INTRAVENOUS at 17:03

## 2017-09-29 RX ADMIN — ENOXAPARIN SODIUM 40 MG: 40 INJECTION SUBCUTANEOUS at 10:19

## 2017-09-29 RX ADMIN — INSULIN LISPRO 4 UNITS: 100 INJECTION, SOLUTION INTRAVENOUS; SUBCUTANEOUS at 21:05

## 2017-09-29 RX ADMIN — METOPROLOL TARTRATE 5 MG: 5 INJECTION INTRAVENOUS at 05:23

## 2017-09-29 RX ADMIN — CEFAZOLIN SODIUM 1000 MG: 1 SOLUTION INTRAVENOUS at 17:19

## 2017-09-29 RX ADMIN — MORPHINE SULFATE 2 MG: 2 INJECTION, SOLUTION INTRAMUSCULAR; INTRAVENOUS at 17:03

## 2017-09-29 RX ADMIN — PANTOPRAZOLE SODIUM 40 MG: 40 INJECTION, POWDER, FOR SOLUTION INTRAVENOUS at 10:18

## 2017-09-29 RX ADMIN — MORPHINE SULFATE 2 MG: 2 INJECTION, SOLUTION INTRAMUSCULAR; INTRAVENOUS at 05:25

## 2017-09-29 RX ADMIN — LIDOCAINE 1 PATCH: 50 PATCH CUTANEOUS at 12:35

## 2017-09-29 RX ADMIN — METOPROLOL TARTRATE 5 MG: 5 INJECTION INTRAVENOUS at 22:55

## 2017-09-29 RX ADMIN — METOPROLOL TARTRATE 5 MG: 5 INJECTION INTRAVENOUS at 10:18

## 2017-09-29 RX ADMIN — PANTOPRAZOLE SODIUM 40 MG: 40 INJECTION, POWDER, FOR SOLUTION INTRAVENOUS at 21:04

## 2017-09-29 NOTE — SOCIAL WORK
CM met with patient to discuss discharge plans  Patient presents as alert  He reports living with his spouse Ludy Loja in a 2 story home with 0STE  Drives  DME includes [employer], wc, cane, [employer] (PRN)  Preferred pharmacy [facility]  Pt was open to [facility]  And denies  STR  No living will or POA  No hx MH or D+A treatment  Patient educated on the importance of understanding their medical course and encouraged to ask questions of the medical team  [address] discussed   Patient's spouse available to assist around the home      Emergency Contact: Ludy Loja (Spouse) [phone](C); [phone](K)

## 2017-09-29 NOTE — ASSESSMENT & PLAN NOTE
Assessment:  Patient has NG tube placed to suction which is actively draining at this time  Does not appear to have any coffee-ground or bloody drainage, appears only bilious  Has not passed any flatus  Did have 1 BM yesterday  Abdomen is soft, however with generalized tenderness on exam and bowel sounds are mildly decreased  The patient has not ever had a gastric emptying study done    Plan:  Discussed with GI P A-C will discuss with attending  Patient may be prepped for EGD, however unsure how this would   There is a slight possibility that patient could have an element of gastroparesis given his previously poorly controlled diabetes as well as present and chronic neuropathy in his distal extremities  This could also be to the duodenitis and ulcer  At this point will continue NG tube and follow surgical recommendations to clamp the NG tube and allow patient have a diet in addition to supportive care as per GI    May give patient a trial of Reglan if GI agrees

## 2017-09-29 NOTE — MEDICAL STUDENT
Consultation - Toni Sickle  81 y o  male MRN: [id number]    Unit/Bed#: [facility] 826-01 Encounter: [id number]      Assessment/Plan     Assessment: This is a 81y o -year-old male with diabetes with hyperglycemia  Plan:  Pt is currently NPO with NG tube placed for gastric outlet obstruction  Given his lack of PO intake, and acceptable BG levels, would recommend continuing with correctional insulin and waiting to resume mealtime insulin  Watch for hypoglycemia  Will continue to follow  CC: Diabetes Consult    History of Present Illness     HPI: Amrita Treadwell Sr  is a 81y o  year old male with type 2 diabetes for 6 years admitted yesterday for gastric outlet obstruction, found to have elevated BG levels in the mid 200s  He is on insulin at home--U500, 55 units with breakfast and lunch, 45 units with dinner  He follows with Dr Lisa Treadwell  He endorses polyuria, polydipsia, and nocturia  He denies nephropathy, retinopathy, stroke and claudication but does admit to neuropathy and heart attack  He denies any hypoglycemia  Consults    Review of Systems   Constitutional: Positive for appetite change  HENT: Positive for trouble swallowing  Reports trouble swallowing "because of the tubes"   Eyes: Negative  Cardiovascular: Negative  Gastrointestinal: Positive for abdominal pain  Endocrine: Positive for polydipsia and polyuria  Genitourinary: Positive for frequency  Skin: Negative  Allergic/Immunologic: Negative  Neurological: Negative  Hematological: Negative  Psychiatric/Behavioral: Negative          Historical Information   Past Medical History:   Diagnosis Date    Cardiac disease     COPD (chronic obstructive pulmonary disease)     Coronary artery disease     Diabetes mellitus     Hyperlipidemia     Hypertension     MI (myocardial infarction)     with 3 stents    Prostate cancer      Past Surgical History:   Procedure Laterality Date    ABDOMINAL SURGERY exploratory    PROSTATE SURGERY       Social History   History   Alcohol Use No     History   Drug Use No     History   Smoking Status    Former Smoker    Packs/day: 1 00   Smokeless Tobacco    Never Used     Family History:   Family History   Problem Relation Age of Onset    Heart disease Father        Meds/Allergies   Current Facility-Administered Medications   Medication Dose Route Frequency Provider Last Rate Last Dose    albuterol inhalation solution 2 5 mg  2 5 mg Nebulization Q4H PRN Damon Jensen MD        cyclobenzaprine (FLEXERIL) tablet 10 mg  10 mg Oral HS Kerri Boswell PA-C   10 mg at 09/28/17 2214    dextrose 5 % and sodium chloride 0 9 % infusion  75 mL/hr Intravenous Continuous Damon Jensen MD 75 mL/hr at 09/29/17 0257 75 mL/hr at 09/29/17 0257    enoxaparin (LOVENOX) subcutaneous injection 40 mg  40 mg Subcutaneous Daily Damon Jensen MD   40 mg at 09/29/17 1019    gabapentin (NEURONTIN) capsule 300 mg  300 mg Oral TID Damon Jensen MD   300 mg at 09/28/17 2142    insulin lispro (HumaLOG) 100 units/mL subcutaneous injection 1-5 Units  1-5 Units Subcutaneous TID [facility] [place] Damon Jensen MD   1 Units at 09/29/17 0614    lidocaine (LIDODERM) 5 % patch 1 patch  1 patch Transdermal Daily Eagle Ron Waggoner PA-C        Menthol lozenge 5 4 mg  1 lozenge Mouth/Throat Q2H PRN Martine Santiago        metoprolol (LOPRESSOR) injection 5 mg  5 mg Intravenous Q6H Damon Jensen MD   5 mg at 09/29/17 1018    morphine injection 2 mg  2 mg Intravenous Q3H PRN Damon Jensen MD   2 mg at 09/29/17 1028    ondansetron (ZOFRAN) injection 4 mg  4 mg Intravenous Q6H PRN Damon Jensen MD        pantoprazole (PROTONIX) injection 40 mg  40 mg Intravenous Q12H [address] Damon Jensen MD   40 mg at 09/29/17 1018    phenol (CHLORASEPTIC) 1 4 % mucosal liquid 2 spray  2 spray Mouth/Throat Q2H PRN Martine Santiago        sodium chloride 0 9 % infusion  125 mL/hr Intravenous Continuous Jam Carranza DO   Stopped at 09/28/17 1739    tiotropium (SPIRIVA) capsule for inhaler 18 mcg  18 mcg Inhalation Daily Ramirez Vazquez MD         Allergies   Allergen Reactions    Metformin        Objective   Vitals: Blood pressure 127/67, pulse 91, temperature 98 3 °F (36 8 °C), temperature source Oral, resp  rate 16, height 6' (1 829 m), weight 127 kg (280 lb), SpO2 97 %  Intake/Output Summary (Last 24 hours) at 09/29/17 1115  Last data filed at 09/29/17 1001   Gross per 24 hour   Intake           1127 5 ml   Output             1900 ml   Net           -772 5 ml     Invasive Devices     Peripheral Intravenous Line            Peripheral IV 09/28/17 Left Antecubital less than 1 day          Drain            NG/OG/Enteral Tube Nasogastric 18 Fr Left mouth less than 1 day                Physical Exam   Constitutional: He is oriented to person, place, and time  He appears well-developed and well-nourished  He appears distressed  Pt supine in bed  Appears in mild distress and discomfort  Hiccuping  HENT:   Head: Normocephalic and atraumatic  NG tube in place  Eyes: EOM are normal  Pupils are equal, round, and reactive to light  Cardiovascular: Normal rate and regular rhythm  Pulmonary/Chest: Effort normal and breath sounds normal    Abdominal: Bowel sounds are normal  He exhibits distension  There is no tenderness  There is no rebound and no guarding  Musculoskeletal: He exhibits no edema  Neurological: He is alert and oriented to person, place, and time  Skin: Skin is dry  Rash noted  He is not diaphoretic    significant venous stasis in lower extremities  Significant scaling/flaking of skin on lower extremities  Psychiatric: He has a normal mood and affect  His behavior is normal  Judgment and thought content normal        The history was obtained from the review of the chart, patient      Lab Results:     Results from last 7 days  Lab Units 09/29/17  0453   HEMOGLOBIN A1C % 7 4* Lab Results   Component Value Date    WBC 13 13 (H) 09/29/2017    HGB 11 2 (L) 09/29/2017    HCT 38 9 09/29/2017    MCV 81 (L) 09/29/2017     09/29/2017     Lab Results   Component Value Date/Time    BUN 51 (H) 09/29/2017 04:53 AM    BUN 17 09/09/2015 05:30 AM     09/29/2017 04:53 AM     09/09/2015 05:30 AM    K 5 1 09/29/2017 04:53 AM    K 4 2 09/09/2015 05:30 AM     09/29/2017 04:53 AM     09/09/2015 05:30 AM    CO2 29 09/29/2017 04:53 AM    CO2 35 (H) 09/09/2015 05:30 AM    CREATININE 1 49 (H) 09/29/2017 04:53 AM    CREATININE 1 03 09/09/2015 05:30 AM    AST 20 09/28/2017 12:07 PM    AST 19 09/08/2015 02:00 PM    ALT 23 09/28/2017 12:07 PM    ALT 27 09/08/2015 02:00 PM     No results for input(s): CHOL, HDL, LDL, TRIG, VLDL in the last 72 hours  No results found for: Dalia Flores  POC Glucose (mg/dl)   Date Value   09/29/2017 183 (H)   09/29/2017 181 (H)   09/28/2017 225 (H)   09/28/2017 160 (H)   09/28/2017 159 (H)   07/24/2017 314 (H)   07/24/2017 212 (H)   07/23/2017 283 (H)   07/21/2017 126   07/21/2017 154 (H)       Imaging Studies: I have personally reviewed pertinent reports

## 2017-09-29 NOTE — PLAN OF CARE
Paged by [facility] nurse that patient's NGT output turned bloody and that the patient's left foot is increasingly red and warm  He denies fevers or chills  Denies history of gout  No other symptoms noted by patient   Patient does have zain dark red NGT output likely bleeding  Patient's foot is erythematous and blanchable with some warmth noted  Kelly Driscoll is no evidence of lymphangitis or sepsis  We will start patient on IV Ancef, renally dosed, monitor vital signs, CBC and BMP as prior  GI has been paged by nursing staff  Will check a hemoglobin at 2000  Eagle Kuo PA-C

## 2017-09-29 NOTE — ASSESSMENT & PLAN NOTE
Assessment:  Patient's most recent A1c is 7 4 and his blood sugars have been running between 160-225 during his hospital stay  Plan:  Given patient's NPO at this time will fever patient is slightly hyperglycemic to avoid any hypoglycemic events    Will consider up titration of insulin regimen at the advancement of the patient's diet

## 2017-09-29 NOTE — ASSESSMENT & PLAN NOTE
Assessment:  No wheezes present on examination, patient O2 sat adequate    Plan:  Management as per chronic respiratory failure with hypoxia

## 2017-09-29 NOTE — PROGRESS NOTES
Paged slim and spoke with Ankit Snadhu PA-C to notify of patient's elevated troponin levels  No additional troponin labs will be ordered at this time

## 2017-09-29 NOTE — CONSULTS
Consultation - Jesse Solis Sr  81 y o  male MRN: [id number]    [facility] Encounter: [id number]      Assessment/Plan     Assessment:  DM 2 with hyperglycemia  Gastric outlet obstruction    Plan:  DM 2 with hyperglycemia: Currently on sliding scale and sugars reasonable right now  At home uses U500 with U500 syringe 55 units with breakfast, 55 units with lunch, 45 units with dinner  No changes right now  Continue to monitor with no PO intake on sliding scale  Will adjust regimen as needed  Gastric outlet obstruction: per primary and surgery  CC: Diabetes Consult    History of Present Illness     HPI: Jesse Solis Sr  is a 81y o  year old male with type 2 diabetes for 6 years  He is on insulin at home  He notes polyuria, polydipsia, nocturia and blurry vision  He denies nephropathy, retinopathy, stroke and claudication but does admit to neuropathy and heart attack  He denies any hypoglycemia  Consults    Review of Systems   Constitutional: Positive for appetite change  Negative for fever  HENT: Negative for congestion and trouble swallowing  Eyes: Negative for visual disturbance  Respiratory: Negative for shortness of breath  Cardiovascular: Negative for palpitations  Gastrointestinal: Positive for abdominal distention and abdominal pain  Endocrine: Positive for polydipsia and polyuria  Musculoskeletal: Negative for arthralgias  Neurological: Negative for weakness  Hematological: Negative for adenopathy  Psychiatric/Behavioral: Negative for agitation and confusion         Historical Information   Past Medical History:   Diagnosis Date    Cardiac disease     COPD (chronic obstructive pulmonary disease)     Coronary artery disease     Diabetes mellitus     Hyperlipidemia     Hypertension     MI (myocardial infarction)     with 3 stents    Prostate cancer      Past Surgical History:   Procedure Laterality Date    ABDOMINAL SURGERY      exploratory    PROSTATE SURGERY       Social History   History   Alcohol Use No     History   Drug Use No     History   Smoking Status    Former Smoker    Packs/day: 1 00   Smokeless Tobacco    Never Used     Family History:   Family History   Problem Relation Age of Onset    Heart disease Father        Meds/Allergies   Current Facility-Administered Medications   Medication Dose Route Frequency Provider Last Rate Last Dose    albuterol inhalation solution 2 5 mg  2 5 mg Nebulization Q4H PRN Chastity Muñoz MD        cyclobenzaprine (FLEXERIL) tablet 10 mg  10 mg Oral HS Rhiannon Sher PA-C   10 mg at 09/28/17 2214    dextrose 5 % and sodium chloride 0 9 % infusion  75 mL/hr Intravenous Continuous Chastity Muñoz MD 75 mL/hr at 09/29/17 0257 75 mL/hr at 09/29/17 0257    enoxaparin (LOVENOX) subcutaneous injection 40 mg  40 mg Subcutaneous Daily Chastity Muñoz MD   40 mg at 09/29/17 1019    gabapentin (NEURONTIN) capsule 300 mg  300 mg Oral TID Chastity Muñoz MD   300 mg at 09/28/17 2142    insulin lispro (HumaLOG) 100 units/mL subcutaneous injection 1-5 Units  1-5 Units Subcutaneous TID AC Chastity Muñoz MD   1 Units at 09/29/17 1235    lidocaine (LIDODERM) 5 % patch 1 patch  1 patch Transdermal Daily Eagle Ha PA-C   1 patch at 09/29/17 1235    Menthol lozenge 5 4 mg  1 lozenge Mouth/Throat Q2H PRN Martine Santiago        metoprolol (LOPRESSOR) injection 5 mg  5 mg Intravenous Q6H Chastity Muñoz MD   5 mg at 09/29/17 1018    morphine injection 2 mg  2 mg Intravenous Q3H PRN Chastity Muñoz MD   2 mg at 09/29/17 1028    ondansetron (ZOFRAN) injection 4 mg  4 mg Intravenous Q6H PRN Chastity Muñoz MD        pantoprazole (PROTONIX) injection 40 mg  40 mg Intravenous Q12H [facility] Chastity Muñoz MD   40 mg at 09/29/17 1018    phenol (CHLORASEPTIC) 1 4 % mucosal liquid 2 spray  2 spray Mouth/Throat Q2H PRN Martine Santiago        sodium chloride 0 9 % infusion  125 mL/hr Intravenous Continuous Mateusz Cervantes DO   Stopped at 09/28/17 1739    tiotropium (SPIRIVA) capsule for inhaler 18 mcg  18 mcg Inhalation Daily Pratik Bermudez MD         Allergies   Allergen Reactions    Metformin        Objective   Vitals: Blood pressure 127/67, pulse 91, temperature 98 3 °F (36 8 °C), temperature source Oral, resp  rate 16, height 6' (1 829 m), weight 127 kg (280 lb), SpO2 97 %  Intake/Output Summary (Last 24 hours) at 09/29/17 1330  Last data filed at 09/29/17 1001   Gross per 24 hour   Intake           1127 5 ml   Output             1900 ml   Net           -772 5 ml     Invasive Devices     Peripheral Intravenous Line            Peripheral IV 09/28/17 Left Antecubital 1 day          Drain            NG/OG/Enteral Tube Nasogastric 18 Fr Left mouth less than 1 day                Physical Exam   Constitutional: He appears well-developed and well-nourished  No distress  HENT:   Head: Normocephalic and atraumatic  Mouth/Throat: No oropharyngeal exudate  Eyes: EOM are normal  Pupils are equal, round, and reactive to light  No scleral icterus  Neck: Normal range of motion  Neck supple  Cardiovascular: Normal rate and regular rhythm  Pulmonary/Chest: Effort normal and breath sounds normal  He has no wheezes  Abdominal: He exhibits distension  Musculoskeletal: He exhibits no edema  Lymphadenopathy:     He has no cervical adenopathy  Neurological: He is alert  Skin: Skin is warm and dry  No rash noted  Psychiatric: He has a normal mood and affect  The history was obtained from the review of the chart, patient      Lab Results:     Results from last 7 days  Lab Units 09/29/17  0453   HEMOGLOBIN A1C % 7 4*     Lab Results   Component Value Date    WBC 13 13 (H) 09/29/2017    HGB 11 2 (L) 09/29/2017    HCT 38 9 09/29/2017    MCV 81 (L) 09/29/2017     09/29/2017     Lab Results   Component Value Date/Time    BUN 51 (H) 09/29/2017 04:53 AM    BUN 17 09/09/2015 05:30 AM     09/29/2017 04:53 AM     09/09/2015 05:30 AM    K 5 1 09/29/2017 04:53 AM    K 4 2 09/09/2015 05:30 AM     09/29/2017 04:53 AM     09/09/2015 05:30 AM    CO2 29 09/29/2017 04:53 AM    CO2 35 (H) 09/09/2015 05:30 AM    CREATININE 1 49 (H) 09/29/2017 04:53 AM    CREATININE 1 03 09/09/2015 05:30 AM    AST 20 09/28/2017 12:07 PM    AST 19 09/08/2015 02:00 PM    ALT 23 09/28/2017 12:07 PM    ALT 27 09/08/2015 02:00 PM     No results for input(s): CHOL, HDL, LDL, TRIG, VLDL in the last 72 hours  No results found for: Salvador Dubin  POC Glucose (mg/dl)   Date Value   09/29/2017 183 (H)   09/29/2017 181 (H)   09/28/2017 225 (H)   09/28/2017 160 (H)   09/28/2017 159 (H)   09/28/2017 254 (H)   07/24/2017 314 (H)   07/24/2017 212 (H)   07/23/2017 283 (H)   07/21/2017 126       Imaging Studies: I have personally reviewed pertinent reports  CT abdomen pelvis with contrast [[id number]] Collected: 09/28/17 1312   Order Status: Completed Updated: 09/28/17 1321   Narrative:     CT ABDOMEN AND PELVIS WITH IV CONTRAST    INDICATION:  Generalized abdominal pain starting yesterday  COMPARISON: July 23, 2017    TECHNIQUE:  CT examination of the abdomen and pelvis was performed      Reformatted images were created in axial, sagittal, and coronal planes       Radiation dose length product (DLP) for this visit:  1432 98 mGy-cm    This examination, like all CT scans performed in the [facility], was performed utilizing techniques to minimize radiation dose exposure, including the use of   iterative reconstruction and automated exposure control  IV Contrast:  100 mL of iodixanol (VISIPAQUE)       Enteric Contrast:  Enteric contrast was not administered      FINDINGS:    ABDOMEN    LOWER CHEST: Starr Alexandre is minimal right pleural effusion which is new  Starr Alexandre are a few tiny lung base nodules which are stable   There is mild interstitial prominence in the right middle and lower lobes which is increased from prior   Correlate for any   pulmonary symptoms  LIVER/BILIARY TREE:  Unremarkable  GALLBLADDER:  Sludge or tiny gallstones are visible in the gallbladder   No evidence of acute cholecystitis  SPLEEN:  Unremarkable  PANCREAS:  Unremarkable  ADRENAL GLANDS:  Unremarkable  KIDNEYS/URETERS:  Unremarkable  No hydronephrosis  STOMACH AND BOWEL: Peggy Santiago is a duodenal diverticulum   The wall of the duodenum appears minimally thickened and there seems to be some enhancement of the mucosa in this region   This is new since the previous exam and may indicate duodenitis   The   stomach is very distended with gas and fluid   The remainder of the bowel is unremarkable  APPENDIX:  No findings to suggest appendicitis  ABDOMINOPELVIC CAVITY:  No ascites or free intraperitoneal air  No lymphadenopathy  VESSELS:  Atherosclerotic changes are present   No evidence of aneurysm  PELVIS    REPRODUCTIVE ORGANS:  Radiation seeds in the prostate  URINARY BLADDER:  Unremarkable  ABDOMINAL WALL/INGUINAL REGIONS:  Small fat-containing umbilical hernia noted  OSSEOUS STRUCTURES: Peggy Santiago is a chronic appearing fractures of the right 11th and 12th ribs  Impression:       The duodenum has an inflamed appearance suggesting acute duodenitis   The stomach is quite distended with gas and fluid   Could not entirely exclude the possibility of at least a partial gastric outlet obstruction   Correlate for p o  tolerance  There is also a duodenal diverticulum  Gallstones or sludge without evidence of acute cholecystitis  Small right pleural effusion   Stable minimal lung base nodularity   Slightly increased interstitial prominence of the right lung   Correlate for any pulmonary symptoms

## 2017-09-29 NOTE — ASSESSMENT & PLAN NOTE
Assessment:  Patient's creatinine is mildly improved to 1 49 from 1 55 with gentle IV fluid resuscitation  Patient is still NPO    Plan:  Continue fluids at this time with close monitoring to avoid volume overload  Hold nephrotoxins medications    Trend BMP

## 2017-09-29 NOTE — PROGRESS NOTES
Progress Note - General Surgery   Rj Hickey Sr  81 y o  male MRN: [id number]  Unit/Bed#: [facility] 826-01 Encounter: [id number]    Assessment:  82 yo M with abdominal pain and nausea suggestive of possible gastric outlet obstruction    Plan:  Clamp trial versus pulling NG tube today  Give diet  DC IVF as he tolerates diet  Glucose management per endocrine and primary  FU Echo  Management per cardiology  DVT Ppx per primary  PRN Pain control    Subjective/Objective     Subjective: Patient feels much less bloated today  Some nausea but no vomiting  No fevers/sweats/chills  Has not passed gas or had BM  Objective:    Blood pressure 126/56, pulse 82, temperature 98 9 °F (37 2 °C), temperature source Oral, resp  rate 16, height 6' (1 829 m), weight 127 kg (280 lb), SpO2 97 %  ,Body mass index is 37 97 kg/m²        Intake/Output Summary (Last 24 hours) at 09/29/17 0622  Last data filed at 09/29/17 0255   Gross per 24 hour   Intake            707 5 ml   Output             1500 ml   Net           -792 5 ml       Invasive Devices     Peripheral Intravenous Line            Peripheral IV 09/28/17 Left Antecubital less than 1 day          Drain            NG/OG/Enteral Tube Nasogastric 18 Fr Left mouth less than 1 day                Physical Exam:   General: NAD  Lungs: No audible wheezing  Heart: No audible murmurs  Abdomen: Firm, mildly tender throughout, distended, no peritonitic signs      Results from last 7 days  Lab Units 09/28/17  1730 09/28/17  1207   WBC Thousand/uL  --  15 16*   HEMOGLOBIN g/dL  --  11 3*   HEMATOCRIT %  --  37 8   PLATELETS Thousands/uL 239 268       Results from last 7 days  Lab Units 09/28/17  1207   SODIUM mmol/L 138   POTASSIUM mmol/L 5 3   CHLORIDE mmol/L 105   CO2 mmol/L 26   BUN mg/dL 51*   CREATININE mg/dL 1 58*   GLUCOSE RANDOM mg/dL 246*   CALCIUM mg/dL 9 2       Results from last 7 days  Lab Units 09/28/17  1207   INR  1 11   PTT seconds 30

## 2017-09-29 NOTE — ASSESSMENT & PLAN NOTE
Assessment:  Elevation patient's troponin was likely secondary to distention of stomach  Troponin was stable over this hospital admission    Cardiology consult requested, appreciate input    Plan:  Follow up with results of echocardiogram   Continue Lopressor 5 mg IV q 6 with hold parameters as patient is still nothing by mouth

## 2017-09-29 NOTE — PROGRESS NOTES
Progress Note - Hoda Keyes Sr  81 y o  male MRN: [id number]    Unit/Bed#: [facility] 826-01 Encounter: [id number]        * Partial gastric outlet obstruction   Assessment & Plan      Assessment:  Patient has NG tube placed to suction which is actively draining at this time  Does not appear to have any coffee-ground or bloody drainage, appears only bilious  Has not passed any flatus  Did have 1 BM yesterday  Abdomen is soft, however with generalized tenderness on exam and bowel sounds are mildly decreased  The patient has not ever had a gastric emptying study done    Plan:  Discussed with GI P A-C will discuss with attending  Patient may be prepped for EGD, however unsure how this would   There is a slight possibility that patient could have an element of gastroparesis given his previously poorly controlled diabetes as well as present and chronic neuropathy in his distal extremities  This could also be to the duodenitis and ulcer  At this point will continue NG tube and follow surgical recommendations to clamp the NG tube and allow patient have a diet in addition to supportive care as per GI  May give patient a trial of Reglan if GI agrees        CKD (chronic kidney disease)   Assessment & Plan      Assessment:  Patient's creatinine is mildly improved to 1 49 from 1 55 with gentle IV fluid resuscitation  Patient is still NPO    Plan:  Continue fluids at this time with close monitoring to avoid volume overload  Hold nephrotoxins medications  Trend BMP        COPD (chronic obstructive pulmonary disease)   Assessment & Plan      Assessment:  No wheezes present on examination, patient O2 sat adequate    Plan:  Management as per chronic respiratory failure with hypoxia        Chronic respiratory failure with hypoxia   Assessment & Plan      Assessment:  Patient currently nasal cannula  Reports difficulty breathing, however is satting fine on vitals  No cyanosis noted    No respiratory distress noted  Plan:  Continue supplementary oxygen as well as albuterol nebulizations and Spiriva        Type 2 diabetes mellitus with hyperglycemia   Assessment & Plan      Assessment:  Patient's most recent A1c is 7 4 and his blood sugars have been running between 160-225 during his hospital stay  Plan:  Given patient's NPO at this time will fever patient is slightly hyperglycemic to avoid any hypoglycemic events  Will consider up titration of insulin regimen at the advancement of the patient's diet        Coronary artery disease involving native coronary artery without angina pectoris   Assessment & Plan      Assessment:  Elevation patient's troponin was likely secondary to distention of stomach  Troponin was stable over this hospital admission  Cardiology consult requested, appreciate input    Plan:  Follow up with results of echocardiogram   Continue Lopressor 5 mg IV q 6 with hold parameters as patient is still nothing by mouth        Duodenitis   Assessment & Plan      Assessment:  Appreciate further GI recommendations  After discussion with GI LEATHA VAUGHAN patient may be plan for EGD and patient or he may just follow-up in the office for this    Plan:  Management as above        Obesity (BMI 30-39  9)   Assessment & Plan      Assessment:  Patient's wife states that he has made drastic lifestyle modifications outpatient including eating much more salad    Plan:  Encourage healthy diet and mild exercise              VTE Pharmacologic Prophylaxis:   Pharmacologic: Enoxaparin (Lovenox)  Mechanical VTE Prophylaxis in Place: No    Patient Centered Rounds: I have performed bedside rounds with nursing staff today  Discussions with Specialists or Other Care Team Provider: Discussed with GI RICK, nursing, CM     Education and Discussions with Family / Patient: Discussed with patient, s/o at bedside     Time Spent for Care: 30 minutes    More than 50% of total time spent on counseling and coordination of care as described above  Current Length of Stay: 1 day(s)    Current Patient Status: Inpatient   Certification Statement: The patient will continue to require additional inpatient hospital stay due to On going NGT placement with clamp trial ?today  Further GI evaluation  MARANDA     Discharge Plan: On going at this time pending return of bowel function and tolerating PO  Likely 1-2 days more     Code Status: Level 1 - Full Code      Subjective:   Patient is not very talkative this morning  Mostly daniela with short responses  States that last BM was yesterday, but has not had stool today nor flatus  Wife reports that his abdomen is not as swollen as it was yesterday  He states that he is having a hard time breathing, but denies coughing, wheezing, or tightness  States chest pain has resolved  Wife denies patient ever having a gastric emptying study  Reports that he has been watching his diet at home and has been doing much better with his sugars  Objective:     Vitals:   Temp (24hrs), Av 7 °F (37 1 °C), Min:98 3 °F (36 8 °C), Max:98 9 °F (37 2 °C)    HR:  [80-91] 91  Resp:  [15-22] 16  BP: (103-150)/(56-70) 127/67  SpO2:  [93 %-97 %] 97 %  Body mass index is 37 97 kg/m²  Input and Output Summary (last 24 hours): Intake/Output Summary (Last 24 hours) at 17 1221  Last data filed at 17 1001   Gross per 24 hour   Intake           1127 5 ml   Output             1900 ml   Net           -772 5 ml       Physical Exam:     Physical Exam   Constitutional: He is oriented to person, place, and time  Vital signs are normal  He appears well-developed and well-nourished  He is easily aroused  Non-toxic appearance  No distress  Nasal cannula in place  HENT:   Head: Normocephalic and atraumatic  Nose:       Eyes: Conjunctivae and EOM are normal  Pupils are equal, round, and reactive to light  Neck: Neck supple     Cardiovascular: Normal rate, regular rhythm, S1 normal, S2 normal, normal heart sounds and intact distal pulses  Exam reveals no S3 and no S4  No murmur heard  Pulmonary/Chest: Effort normal and breath sounds normal  No accessory muscle usage  No respiratory distress  He has no decreased breath sounds  He has no wheezes  He has no rhonchi  He has no rales  He exhibits no tenderness  Abdominal: Soft  He exhibits no distension and no mass  Bowel sounds are decreased  There is generalized tenderness  There is no rigidity, no rebound and no guarding  Neurological: He is alert, oriented to person, place, and time and easily aroused  Skin: Skin is warm and dry  Additional Data:     Labs:      Results from last 7 days  Lab Units 09/29/17  0453  09/28/17  1207   WBC Thousand/uL 13 13*  --  15 16*   HEMOGLOBIN g/dL 11 2*  --  11 3*   HEMATOCRIT % 38 9  --  37 8   PLATELETS Thousands/uL 230  < > 268   NEUTROS PCT %  --   --  87*   LYMPHS PCT %  --   --  8*   MONOS PCT %  --   --  5   EOS PCT %  --   --  0   < > = values in this interval not displayed  Results from last 7 days  Lab Units 09/29/17  0453 09/28/17  1207   SODIUM mmol/L 141 138   POTASSIUM mmol/L 5 1 5 3   CHLORIDE mmol/L 106 105   CO2 mmol/L 29 26   BUN mg/dL 51* 51*   CREATININE mg/dL 1 49* 1 58*   CALCIUM mg/dL 9 1 9 2   TOTAL PROTEIN g/dL  --  7 5   BILIRUBIN TOTAL mg/dL  --  0 35   ALK PHOS U/L  --  75   ALT U/L  --  23   AST U/L  --  20   GLUCOSE RANDOM mg/dL 172* 246*       Results from last 7 days  Lab Units 09/28/17  1207   INR  1 11       * I Have Reviewed All Lab Data Listed Above  * Additional Pertinent Lab Tests Reviewed:  [facility]    Imaging:    Imaging Reports Reviewed Today Include: None  Imaging Personally Reviewed by Myself Includes:  None    Recent Cultures (last 7 days):           Last 24 Hours Medication List:     cyclobenzaprine 10 mg Oral HS   enoxaparin 40 mg Subcutaneous Daily   gabapentin 300 mg Oral TID   insulin lispro 1-5 Units Subcutaneous TID AC   lidocaine 1 patch Transdermal Daily   metoprolol 5 mg Intravenous Q6H   pantoprazole 40 mg Intravenous Q12H [address]   tiotropium 18 mcg Inhalation Daily        Today, Patient Was Seen By: Jase Zapata PA-C    ** Please Note: Dictation voice to text software may have been used in the creation of this document   **

## 2017-09-29 NOTE — ASSESSMENT & PLAN NOTE
Assessment:  Patient's wife states that he has made drastic lifestyle modifications outpatient including eating much more salad    Plan:    Encourage healthy diet and mild exercise

## 2017-09-29 NOTE — PLAN OF CARE

## 2017-09-29 NOTE — CASE MANAGEMENT
[address] [place] [address] in the [facility] by Onofre Carroll for 2017  Network Utilization Review Department  Phone: [phone]; Fax [phone]  ATTENTION: The Network Utilization Review Department is now centralized for our 7 Facilities  Make a note that we have a new phone and fax numbers for our Department  Please call with any questions or concerns to [phone] and carefully follow the prompts so that you are directed to the right person  All voicemails are confidential  Fax any determinations, approvals, denials, and requests for initial or continue stay review clinical to [phone]  Due to HIGH CALL volume, it would be easier if you could please send faxed requests to expedite your requests and in part, help us provide discharge notifications faster  Initial Clinical Review    Admission: Date/Time/Statement:    9/28/17 @ 1501     Orders Placed This Encounter   Procedures    Inpatient Admission (expected length of stay for this patient is greater than two midnights)     Standing Status:   Standing     Number of Occurrences:   1     Order Specific Question:   Admitting Physician     Answer:   Zaida Flores [[id number]]     Order Specific Question:   Level of Care     Answer:   Med Surg [16]     Order Specific Question:   Estimated length of stay     Answer:   More than 2 Midnights     Order Specific Question:   Certification     Answer:   I certify that inpatient services are medically necessary for this patient for a duration of greater than two midnights  See H&P and MD Progress Notes for additional information about the patient's course of treatment       ED: Date/Time/Mode of Arrival:   ED Arrival Information     Expected Arrival Acuity Means of Arrival Escorted By Service Admission Type    - 9/28/2017 11:13 Emergent [facility]    Arrival Complaint    Nausea        Chief Complaint:   Chief Complaint   Patient presents with  Abdominal Pain     Pt complaining of general abdominal pain that started yesterday  Denies any nausea, vomiting, diarrah, fevers  Chief Complaint:   Abdominal pain and sitrention x 3 days     History of Present Illness:   Nikki Quintero Sr  is a 81 y o  male who presents with 3 day history of abdominal pain and distention  Patient also with increased belching over past several days as well  He has chronic acid reflux and not on medications for this  He has daily bowel movements, last this am, normal  He is scheduled to see GI tomorrow for heme positive dark stools, no grossly bloody BM  No chest pain, some SOB, no LH or palpitations      Physical Exam:  Sleeping but arousable (received morphine)  Sclera anicteric, +NGT  RRR  Decreased air exchange 2/2 effort  Soft, hypoactive BS, obese  B/l LE edema      ED Vital Signs:   ED Triage Vitals [09/28/17 1117]   Temperature Pulse Respirations Blood Pressure SpO2   97 5 °F (36 4 °C) 91 20 161/86 (!) 75 %      Temp Source Heart Rate Source Patient Position - Orthostatic VS BP Location FiO2 (%)   Tympanic Monitor Sitting Left arm --      Pain Score       6        Wt Readings from Last 1 Encounters:   09/28/17 127 kg (280 lb)       LABS/Diagnostic Test Results:   CBC  Results from last 7 days  Lab Units 09/28/17  1730 09/28/17  1207   WBC Thousand/uL  --  15 16*   HEMOGLOBIN g/dL  --  11 3*   HEMATOCRIT %  --  37 8   PLATELETS Thousands/uL 239 268   NEUTROS PCT %  --  87*   LYMPHS PCT %  --  8*   MONOS PCT %  --  5   EOS PCT %  --  0      CMP  Results from last 7 days  Lab Units 09/28/17  1207   SODIUM mmol/L 138   POTASSIUM mmol/L 5 3   CHLORIDE mmol/L 105   CO2 mmol/L 26   BUN mg/dL 51*   CREATININE mg/dL 1 58*   CALCIUM mg/dL 9 2   TOTAL PROTEIN g/dL 7 5   BILIRUBIN TOTAL mg/dL 0 35   ALK PHOS U/L 75   ALT U/L 23   AST U/L 20   GLUCOSE RANDOM mg/dL 246*      Lipase [[id number]] (Normal) Collected: 09/28/17 1207   Lab Status: Final result Specimen: Blood from Arm, Left Updated: 09/28/17 1238    Lipase 89 73 - 393 u/L    B-type natriuretic peptide [[id number]] (Abnormal) Collected: 09/28/17 1207   Lab Status: Final result Specimen: Blood from Arm, Left Updated: 09/28/17 1238    NT-proBNP 3,091 (H) <125 pg/mL      Lab Units 09/28/17  1207   INR   1 11       CT ABDOMEN PELVIS -  The duodenum has an inflamed appearance suggesting acute duodenitis  The stomach is quite distended with gas and fluid  Could not entirely exclude the possibility of at least a partial gastric outlet obstruction  Correlate for p o  tolerance  There is also a duodenal diverticulum  Gallstones or sludge without evidence of acute cholecystitis  Small right pleural effusion  Stable minimal lung base nodularity  Slightly increased interstitial prominence of the right lung    Correlate for any pulmonary symptoms      ED Treatment:   Medication Administration from 09/28/2017 1112 to 09/28/2017 1903       Date/Time Order Dose Route Action Action by Comments     09/28/2017 1739 sodium chloride 0 9 % infusion 0 mL/hr Intravenous Stopped Franny Mccarthy RN      09/28/2017 1222 sodium chloride 0 9 % infusion 125 mL/hr Intravenous 22 [facility], [address] [place] [address]      09/28/2017 1222 ondansetron (ZOFRAN) injection 4 mg 4 mg Intravenous Given Franny Mccarthy, BEATRICE      09/28/2017 1222 fentanyl citrate (PF) 100 MCG/2ML 50 mcg 50 mcg Intravenous Given Franny Mccarthy RN      09/28/2017 1222 ipratropium-albuterol (DUO-NEB) 0 5-2 5 mg/mL inhalation solution 3 mL 3 mL Nebulization Given Franny Mccarthy RN      09/28/2017 1307 iodixanol (VISIPAQUE) 320 MG/ML injection 100 mL 100 mL Intravenous Given Denzel Do      09/28/2017 1730 albuterol inhalation solution 2 5 mg 2 5 mg Nebulization Given Franny Mccarthy, BEATRICE      09/28/2017 1730 metoprolol (LOPRESSOR) injection 5 mg 5 mg Intravenous Given Franny Mccarthy, BEATRICE      09/28/2017 1729 dextrose 5 % and sodium chloride 0 9 % infusion 75 mL/hr Intravenous New Bag Nkechi Clements RN           Past Medical/Surgical History:    Active Ambulatory Problems     Diagnosis Date Noted    Coronary artery disease involving native coronary artery without angina pectoris 01/20/2016    Gastroesophageal reflux disease without esophagitis 01/20/2016    Obesity (BMI 30-39 9) 01/20/2016    Dyslipidemia 01/20/2016    H/O prostate cancer 01/20/2016    Back pain 11/12/2016    Type 2 diabetes mellitus with hyperglycemia 11/13/2016    Venous stasis dermatitis of both lower extremities 11/15/2016    Opioid dependence 11/17/2016    Chronic respiratory failure with hypoxia 07/23/2017    COPD (chronic obstructive pulmonary disease) 07/23/2017    Bilateral lower extremity edema 07/24/2017    Hyponatremia 07/24/2017    CKD (chronic kidney disease) 07/24/2017     Resolved Ambulatory Problems     Diagnosis Date Noted    Chest pain 01/20/2016    COPD exacerbation 01/20/2016    Dizziness 11/12/2016    Diabetic polyneuropathy associated with type 2 diabetes mellitus 11/15/2016    Leukocytosis 07/17/2017     Past Medical History:   Diagnosis Date    Cardiac disease     COPD (chronic obstructive pulmonary disease)     Coronary artery disease     Diabetes mellitus     Hyperlipidemia     Hypertension     MI (myocardial infarction)     Prostate cancer        Admitting Diagnosis: Nausea [R11 0]  Abdominal pain [R10 9]  Acute renal failure [N17 9]  Heme positive stool [R19 5]  Elevated troponin [R74 8]  COPD exacerbation [J44 1]  Partial gastric outlet obstruction [K31 1]  Duodenitis [K29 80]  Coronary artery disease involving native coronary artery without angina pectoris [I25 10]  Type 2 diabetes mellitus with hyperglycemia, with long-term current use of insulin [E11 65, Z79 4]    Age/Sex: 81 y o  male    Assessment/Plan:   Hospital Problem List:    Principal Problem:    Partial gastric outlet obstruction  Active Problems:    Coronary artery disease involving native coronary artery without angina pectoris    Gastroesophageal reflux disease without esophagitis    Obesity (BMI 30-39  9)    Dyslipidemia    Type 2 diabetes mellitus with hyperglycemia    Chronic respiratory failure with hypoxia    COPD (chronic obstructive pulmonary disease)    CKD (chronic kidney disease)    Duodenitis     Present on Admission:   Coronary artery disease involving native coronary artery without angina pectoris   Gastroesophageal reflux disease without esophagitis   Obesity (BMI 30-39  9)   Dyslipidemia   Chronic respiratory failure with hypoxia   COPD (chronic obstructive pulmonary disease)   CKD (chronic kidney disease)   Type 2 diabetes mellitus with hyperglycemia   Partial gastric outlet obstruction   Duodenitis        Plan for the Primary Problem(s):  · Partial gastric outlet obstruction and duodenitis  ? Seen by general surgery and NGT tube placed, analgesics as needed  ? Antiemetics as needed  ? Bid ppi  ? GI consult for possible endoscopy  ?  NPO     Plan for Additional Problems:   · GERD: not on medications as OP, starting protonix bid  · CAD: no complaint of chest discomfort, initial troponin 0 09 in setting of MARANDA, dehydration, will continue to trend, beta blocker therapy IV, check echo, cardiology evaluation given cardiac hx  · MARANDA: clinically appears dehydrated: IVFs, repeat bmp in am, check UA, if no improvement in creatine, consider renal US, hold nephrotoxic agents  · IDDM: on U-500 as OP will hold given pt will be NPO, SSI, endocrine eval, monitor BS closely, check hgb a1c  · COPD: resume bronchodilators as taking as OP, will need home nebulizer on discharge, pt has not gotten neb since last d/c, currently off steroids, continue supplemental oxygen, wife reports pt taking spiriva, per pharmacy it hasnt been filled in a couple months, ? compliance  · Obesity: weight loss encourage, lifestyle modification     VTE Prophylaxis: Enoxaparin (Lovenox)  / sequential compression device   Code Status: full  POLST: There is no POLST form on file for this patient (pre-hospital)     Anticipated Length of Stay:  Patient will be admitted on an Inpatient basis with an anticipated length of stay of  greater 2 midnights  Justification for Hospital Stay: mgt partial gastric outlet obstruction, pt npo, ngt      Admission Orders:  9/28/17 AT 1501  ADMIT INPATIENT  TELEMETRY  VS Q4HRS    Up with Assistance  SCD    NPO  NGT - LCS    Continuous IV Infusions:   dextrose 5 % and sodium chloride 0 9 % 75 mL/hr Last Rate: 75 mL/hr (09/29/17 0257)   sodium chloride 125 mL/hr Last Rate: Stopped (09/28/17 5237)       Scheduled Meds:   cyclobenzaprine 10 mg Oral HS   enoxaparin 40 mg Subcutaneous Daily   gabapentin 300 mg Oral TID   insulin lispro 1-5 Units Subcutaneous TID AC   lidocaine 1 patch Transdermal Daily   metoprolol 5 mg Intravenous Q6H   pantoprazole 40 mg Intravenous Q12H DRAKE   tiotropium 18 mcg Inhalation Daily     PRN Meds:     albuterol    Menthol    IV morphine injection 2 mg q3hrs prn given x 3    ondansetron    phenol    Consult Card  ECHO    Consult GI       Cardiology  Consults Date of Service: 9/29/2017  8:53 AM     Assessment/Plan   Principal Problem:    Partial gastric outlet obstruction  Active Problems:    Coronary artery disease involving native coronary artery without angina pectoris    Gastroesophageal reflux disease without esophagitis    Obesity (BMI 30-39  9)    Dyslipidemia    Type 2 diabetes mellitus with hyperglycemia    Chronic respiratory failure with hypoxia    COPD (chronic obstructive pulmonary disease)    CKD (chronic kidney disease)    Duodenitis        Assessment/Plan   1  Partial gastric outlet obstruction  NGT in place  Surgery following  No surgery planned at this time  Reported improved abdominal pain and distention     2   Acute on chronic respiratory failure  Multifactorial - COPD/obesity/pulmonary hypertension/abdominal distention and pain and decreased respiratory effort/small right pleural effusion  Pulse ox 75% on arrival  Now on n/c  CXR- small right pleural effusion  ProBNP 3091  Takes lasix 20mg QOD- currently on hold as he is NPO and with MARANDA  Monitor for volume overload and treat with prn IV lasix as needed  F/U with echo- prior echocardiogram normal LVEF with severe pulmonary hypertension and RV dilitation     3  Severe pulmonary hypertension/RV dilitation- monitor for volume overload while off diuretics     4  CAD with prior NSTEMI 2009 with DYAN X 2 to RCA and Diag 1   CC 2015- stents patent and no occlusive CAD  Mild troponin elevation of 0 1  No reported CP  D/T MARANDA/ acute respiratory failure  F/U with echocardiogram  Will likely proceed with outpatient stress testing once recovered from GI illness  EKG- NSR RBBB ( chronic)  Resume asa once able to , continue IV bb while npo and resume statin once taking PO     5  HTN- controlled  Takes lopressor 25mg BID at home - IV ordered here as he is NPO  Lisinopril on hold as well       6  HLD- statin on hold while NPO     7  Morbid obesity/JACQUELINE     8  Diabetes- insulin coverage, per primary      9  CKD/MARANDA- baseline about 1 0   Receiving IVF while NPO, Monitor for volume overload

## 2017-09-29 NOTE — ASSESSMENT & PLAN NOTE
Assessment:  Patient currently nasal cannula  Reports difficulty breathing, however is satting fine on vitals  No cyanosis noted  No respiratory distress noted      Plan:  Continue supplementary oxygen as well as albuterol nebulizations and Spiriva

## 2017-09-29 NOTE — CONSULTS
Consultation - Cardiology   Amparo Carrillo  81 y o  male MRN: [id number]  Unit/Bed#: [facility] 826-01 Encounter: [id number]    Assessment/Plan     Principal Problem:    Partial gastric outlet obstruction  Active Problems:    Coronary artery disease involving native coronary artery without angina pectoris    Gastroesophageal reflux disease without esophagitis    Obesity (BMI 30-39  9)    Dyslipidemia    Type 2 diabetes mellitus with hyperglycemia    Chronic respiratory failure with hypoxia    COPD (chronic obstructive pulmonary disease)    CKD (chronic kidney disease)    Duodenitis      Assessment/Plan    1  Partial gastric outlet obstruction  NGT in place  Surgery following  No surgery planned at this time  Reported improved abdominal pain and distention    2  Acute on chronic respiratory failure  Multifactorial - COPD/obesity/pulmonary hypertension/abdominal distention and pain and decreased respiratory effort/small right pleural effusion  Pulse ox 75% on arrival  Now on n/c  CXR- small right pleural effusion  ProBNP 3091  Takes lasix 20mg QOD- currently on hold as he is NPO and with MARANDA  Monitor for volume overload and treat with prn IV lasix as needed  F/U with echo- prior echocardiogram normal LVEF with severe pulmonary hypertension and RV dilitation    3  Severe pulmonary hypertension/RV dilitation- monitor for volume overload while off diuretics    4  CAD with prior NSTEMI 2009 with DYAN X 2 to RCA and Diag 1   CC 2015- stents patent and no occlusive CAD  Mild troponin elevation of 0 1  No reported CP  D/T MARANDA/ acute respiratory failure  F/U with echocardiogram  Will likely proceed with outpatient stress testing once recovered from GI illness  EKG- NSR RBBB ( chronic)  Resume asa once able to , continue IV bb while npo and resume statin once taking PO    5  HTN- controlled  Takes lopressor 25mg BID at home - IV ordered here as he is NPO  Lisinopril on hold as well  6  HLD- statin on hold while NPO    7  Morbid obesity/JACQUELINE    8  Diabetes- insulin coverage, per primary  9  CKD/MARANDA- baseline about 1 0  Receiving IVF while NPO, Monitor for volume overload  History of Present Illness   Physician Requesting Consult: Chelsea Young DO  Reason for Consult / Principal Problem: chest pain/ elevated troponin    HPI: Chester Ramirez  is a 81y o  year old male with JACQUELINE, COPD, CAD s/p NSTEMI  with DYAN to Diag 1, RCA X2, normal LVEF, HLD, HTN, diabetes who presents with 3 day history of abdominal pain and distention  Imaging shows a possible partial gastric outlet obstruction, acute duodenitis  He is now NPO and has an NGT  Unclear why troponins were checked but they are mildly elevated and is BUN/creatinine  He wears oxygen HS and prn during the day  According to the wife at bedside he has been using oxygen more during the day  His activity is mostly limited by chronic dyspnea and more so recently d/t severe lower back pain which had been more acute on chronic  He has not been complaining of exertional CP  He has not noted LE edema  His wife at bedside provided his history and recent symptoms as the patient is sleepy at this time  The patient is known to Dr Beryle Puff and was last seen in the office Dec 2016  Most recent cardiac testinD echo 2015: normal LVEF, grade 1 DD, RV dilated, moderate to severe pulmonary hypertension  Cardiac cath - stents patent  Non occlusive disease diag 1 50%, prox RCA 30%, prox LAD at origin of Diag 1 30%      Inpatient consult to Cardiology  Consult performed by: Tyron Ward ordered by: Rio Martin          Review of Systems    Historical Information   Past Medical History:   Diagnosis Date    Cardiac disease     COPD (chronic obstructive pulmonary disease)     Coronary artery disease     Diabetes mellitus     Hyperlipidemia     Hypertension     MI (myocardial infarction)     with 3 stents    Prostate cancer      Past Surgical History: Procedure Laterality Date    ABDOMINAL SURGERY      exploratory    PROSTATE SURGERY       History   Alcohol Use No     History   Drug Use No     History   Smoking Status    Former Smoker    Packs/day: 1 00   Smokeless Tobacco    Never Used     Family History:   Family History   Problem Relation Age of Onset    Heart disease Father        Meds/Allergies   current meds:   Current Facility-Administered Medications   Medication Dose Route Frequency    albuterol inhalation solution 2 5 mg  2 5 mg Nebulization Q4H PRN    cyclobenzaprine (FLEXERIL) tablet 10 mg  10 mg Oral HS    dextrose 5 % and sodium chloride 0 9 % infusion  75 mL/hr Intravenous Continuous    enoxaparin (LOVENOX) subcutaneous injection 40 mg  40 mg Subcutaneous Daily    gabapentin (NEURONTIN) capsule 300 mg  300 mg Oral TID    insulin lispro (HumaLOG) 100 units/mL subcutaneous injection 1-5 Units  1-5 Units Subcutaneous TID AC    Menthol lozenge 5 4 mg  1 lozenge Mouth/Throat Q2H PRN    metoprolol (LOPRESSOR) injection 5 mg  5 mg Intravenous Q6H    morphine injection 2 mg  2 mg Intravenous Q3H PRN    ondansetron (ZOFRAN) injection 4 mg  4 mg Intravenous Q6H PRN    pantoprazole (PROTONIX) injection 40 mg  40 mg Intravenous Q12H DRAKE    phenol (CHLORASEPTIC) 1 4 % mucosal liquid 2 spray  2 spray Mouth/Throat Q2H PRN    sodium chloride 0 9 % infusion  125 mL/hr Intravenous Continuous    tiotropium (SPIRIVA) capsule for inhaler 18 mcg  18 mcg Inhalation Daily    and PTA meds:  Prescriptions Prior to Admission   Medication    acetaminophen (TYLENOL) 325 mg tablet    albuterol (2 5 mg/3 mL) 0 083 % nebulizer solution    cyclobenzaprine (FLEXERIL) 10 mg tablet    gabapentin (NEURONTIN) 300 mg capsule    insulin regular (HUMULIN R) 500 units/mL CONCENTRATED injection    lisinopril (ZESTRIL) 40 mg tablet    metoprolol tartrate (LOPRESSOR) 25 mg tablet    Omega-3 Fatty Acids (FISH OIL) 1200 MG CAPS    oxyCODONE-acetaminophen (PERCOCET) 7 5-325 MG per tablet    simvastatin (ZOCOR) 40 mg tablet    tiotropium (SPIRIVA) 18 mcg inhalation capsule    glipiZIDE (GLUCOTROL) 5 mg tablet     Allergies   Allergen Reactions    Metformin        Objective   Vitals: Blood pressure 127/67, pulse 91, temperature 98 3 °F (36 8 °C), temperature source Oral, resp  rate 16, height 6' (1 829 m), weight 127 kg (280 lb), SpO2 97 %  Orthostatic Blood Pressures    Flowsheet Row Most Recent Value   Blood Pressure  127/67 filed at 09/29/2017 0759   Patient Position - Orthostatic VS  Lying filed at 09/29/2017 0759            Intake/Output Summary (Last 24 hours) at 09/29/17 0854  Last data filed at 09/29/17 0255   Gross per 24 hour   Intake            707 5 ml   Output             1500 ml   Net           -792 5 ml       Invasive Devices     Peripheral Intravenous Line            Peripheral IV 09/28/17 Left Antecubital less than 1 day          Drain            NG/OG/Enteral Tube Nasogastric 18 Fr Left mouth less than 1 day                Physical Exam: /67   Pulse 91   Temp 98 3 °F (36 8 °C) (Oral)   Resp 16   Ht 6' (1 829 m)   Wt 127 kg (280 lb)   SpO2 97%   BMI 37 97 kg/m²   General Appearance:    Alert, cooperative, no distress, appears stated age   Morbidly obese   Head:    Normocephalic, no scleral icterus   Eyes:    PERRL   Nose:   Nares normal, septum midline, mucosa normal, no drainage    Throat:   Lips, mucosa, and tongue normal   Neck:   Supple, symmetrical, trachea midline     no carotid bruit or JVD   Back:     Symmetric   Lungs:     Rhonchi to auscultation bilaterally, respirations appear slight labored, on n/c   Chest Wall:    No tenderness or deformity    Heart:    Regular rate and rhythm, S1 and S2 normal, no murmur, rub   or gallop   Abdomen:     Obese, NGT inplace   Extremities:   Extremities normal, atraumatic, no cyanosis or edema   Pulses:   2+ and symmetric all extremities   Skin:   Skin color, texture, turgor normal, no rashes or lesions           Lab Results:   Recent Results (from the past 72 hour(s))   ECG 12 lead    Collection Time: 09/28/17 11:30 AM   Result Value Ref Range    Ventricular Rate 94 BPM    Atrial Rate 94 BPM    SC Interval 172 ms    QRSD Interval 128 ms    QT Interval 364 ms    QTC Interval 455 ms    P Axis 109 degrees    QRS Axis 98 degrees    T Wave Axis 65 degrees   CBC and differential    Collection Time: 09/28/17 12:07 PM   Result Value Ref Range    WBC 15 16 (H) 4 31 - 10 16 Thousand/uL    RBC 4 74 3 88 - 5 62 Million/uL    Hemoglobin 11 3 (L) 12 0 - 17 0 g/dL    Hematocrit 37 8 36 5 - 49 3 %    MCV 80 (L) 82 - 98 fL    MCH 23 8 (L) 26 8 - 34 3 pg    MCHC 29 9 (L) 31 4 - 37 4 g/dL    RDW 16 8 (H) 11 6 - 15 1 %    MPV 9 9 8 9 - 12 7 fL    Platelets 157 542 - 141 Thousands/uL    nRBC 0 /100 WBCs    Neutrophils Relative 87 (H) 43 - 75 %    Lymphocytes Relative 8 (L) 14 - 44 %    Monocytes Relative 5 4 - 12 %    Eosinophils Relative 0 0 - 6 %    Basophils Relative 0 0 - 1 %    Neutrophils Absolute 13 25 (H) 1 85 - 7 62 Thousands/µL    Lymphocytes Absolute 1 14 0 60 - 4 47 Thousands/µL    Monocytes Absolute 0 70 0 17 - 1 22 Thousand/µL    Eosinophils Absolute 0 01 0 00 - 0 61 Thousand/µL    Basophils Absolute 0 01 0 00 - 0 10 Thousands/µL   Protime-INR    Collection Time: 09/28/17 12:07 PM   Result Value Ref Range    Protime 14 3 12 1 - 14 4 seconds    INR 1 11 0 86 - 1 16   APTT    Collection Time: 09/28/17 12:07 PM   Result Value Ref Range    PTT 30 23 - 35 seconds   Comprehensive metabolic panel    Collection Time: 09/28/17 12:07 PM   Result Value Ref Range    Sodium 138 136 - 145 mmol/L    Potassium 5 3 3 5 - 5 3 mmol/L    Chloride 105 100 - 108 mmol/L    CO2 26 21 - 32 mmol/L    Anion Gap 7 4 - 13 mmol/L    BUN 51 (H) 5 - 25 mg/dL    Creatinine 1 58 (H) 0 60 - 1 30 mg/dL    Glucose 246 (H) 65 - 140 mg/dL    Calcium 9 2 8 3 - 10 1 mg/dL    AST 20 5 - 45 U/L    ALT 23 12 - 78 U/L    Alkaline Phosphatase 75 46 - 116 U/L    Total Protein 7 5 6 4 - 8 2 g/dL    Albumin 2 8 (L) 3 5 - 5 0 g/dL    Total Bilirubin 0 35 0 20 - 1 00 mg/dL    eGFR 44 ml/min/1 73sq m   Lipase    Collection Time: 09/28/17 12:07 PM   Result Value Ref Range    Lipase 89 73 - 393 u/L   B-type natriuretic peptide    Collection Time: 09/28/17 12:07 PM   Result Value Ref Range    NT-proBNP 3,091 (H) <125 pg/mL   POCT troponin    Collection Time: 09/28/17 12:10 PM   Result Value Ref Range    POC Troponin I 0 09 (H) 0 00 - 0 08 ng/ml    Specimen Type VENOUS    Platelet count    Collection Time: 09/28/17  5:30 PM   Result Value Ref Range    Platelets 662 493 - 630 Thousands/uL    MPV 9 5 8 9 - 12 7 fL   Troponin I    Collection Time: 09/28/17  5:30 PM   Result Value Ref Range    Troponin I 0 10 (H) <=0 04 ng/mL   Fingerstick Glucose (POCT)    Collection Time: 09/28/17  5:55 PM   Result Value Ref Range    POC Glucose 159 (H) 65 - 140 mg/dl   Troponin I    Collection Time: 09/28/17  8:05 PM   Result Value Ref Range    Troponin I 0 10 (H) <=0 04 ng/mL   Fingerstick Glucose (POCT)    Collection Time: 09/28/17  8:46 PM   Result Value Ref Range    POC Glucose 160 (H) 65 - 140 mg/dl   Fingerstick Glucose (POCT)    Collection Time: 09/28/17 11:44 PM   Result Value Ref Range    POC Glucose 225 (H) 65 - 140 mg/dl   Hemoglobin A1c (Orders if not completed within the last 90 days)    Collection Time: 09/29/17  4:53 AM   Result Value Ref Range    Hemoglobin A1C 7 4 (H) 4 2 - 6 3 %     mg/dl   Basic metabolic panel    Collection Time: 09/29/17  4:53 AM   Result Value Ref Range    Sodium 141 136 - 145 mmol/L    Potassium 5 1 3 5 - 5 3 mmol/L    Chloride 106 100 - 108 mmol/L    CO2 29 21 - 32 mmol/L    Anion Gap 6 4 - 13 mmol/L    BUN 51 (H) 5 - 25 mg/dL    Creatinine 1 49 (H) 0 60 - 1 30 mg/dL    Glucose 172 (H) 65 - 140 mg/dL    Calcium 9 1 8 3 - 10 1 mg/dL    eGFR 47 ml/min/1 73sq m   CBC (With Platelets)    Collection Time: 09/29/17  4:53 AM   Result Value Ref Range WBC 13 13 (H) 4 31 - 10 16 Thousand/uL    RBC 4 80 3 88 - 5 62 Million/uL    Hemoglobin 11 2 (L) 12 0 - 17 0 g/dL    Hematocrit 38 9 36 5 - 49 3 %    MCV 81 (L) 82 - 98 fL    MCH 23 3 (L) 26 8 - 34 3 pg    MCHC 28 8 (L) 31 4 - 37 4 g/dL    RDW 16 8 (H) 11 6 - 15 1 %    Platelets 440 622 - 596 Thousands/uL    MPV 9 9 8 9 - 12 7 fL   Magnesium    Collection Time: 17  4:53 AM   Result Value Ref Range    Magnesium 2 8 (H) 1 6 - 2 6 mg/dL   Fingerstick Glucose (POCT)    Collection Time: 17  6:06 AM   Result Value Ref Range    POC Glucose 181 (H) 65 - 140 mg/dl   Fingerstick Glucose (POCT)    Collection Time: 17  7:29 AM   Result Value Ref Range    POC Glucose 183 (H) 65 - 140 mg/dl     [address]   Phone: [phone]   TRANSTHORACIC ECHOCARDIOGRAM   2D, M-MODE, DOPPLER, AND COLOR DOPPLER   Study date:  11-Sep-2015   Patient: Wenceslao Ashraf   MR number: [id number]   Account number: [de-identified]   : 1945   Age: 78 years   Gender: Male   Status: Inpatient   Location: Bedside   Height: 72 in   Weight: 244 4 lb   BP: 157/ 89 mmHg   Indications: Chest Pain   Diagnoses: 786 50 - CHEST PAIN NOS   Sonographer:  Jaguar John   Primary Physician:  Rik Cameron MD   Referring Physician:  Aki Benitez MD   Group:  [facility]   Interpreting Physician:  Emilia Fothergill, MD   SUMMARY   LEFT VENTRICLE:   Systolic function was normal  Ejection fraction was estimated to be 60 %  There were no regional wall motion abnormalities  Doppler parameters were consistent with abnormal left ventricular relaxation   (grade 1 diastolic dysfunction)  RIGHT VENTRICLE:   The ventricle was dilated  RIGHT ATRIUM:   The atrium was dilated  TRICUSPID VALVE:   There was mild regurgitation  Estimated peak PA pressure was 68 mmHg  The findings suggest moderate to severe pulmonary hypertension     IVC, HEPATIC VEINS: Respirophasic changes in dimension were absent  HISTORY: PRIOR HISTORY: Chest Pain, Hypertension, Dm, CAD/Stents x 3 , NSTEMI,   TRANSTHORACIC ECHOCARDIOGRAM   Patient: Lina Jones   MR number: [id number]    ------ Page 2   COPD, Chest Pain Increases with Exertion and Deep Inspiration   PROCEDURE: The procedure was performed at the bedside  This was a routine   study  The transthoracic approach was used  The study included complete 2D   imaging, M-mode, complete spectral Doppler, and color Doppler  Echocardiographic views were limited due to poor acoustic window availability,   decreased penetration, and lung interference  This was a technically difficult   study  LEFT VENTRICLE: Size was normal  Systolic function was normal  Ejection   fraction was estimated to be 60 %  There were no regional wall motion   abnormalities  Wall thickness could not be accurately determined  DOPPLER:   There was an increased relative contribution of atrial contraction to   ventricular filling  Doppler parameters were consistent with abnormal left   ventricular relaxation (grade 1 diastolic dysfunction)  RIGHT VENTRICLE: The ventricle was dilated  Systolic function was normal  Wall   thickness was normal    LEFT ATRIUM: Size was normal    RIGHT ATRIUM: The atrium was dilated  MITRAL VALVE: There was normal leaflet separation  Not well visualized  DOPPLER: The transmitral velocity was within the normal range  There was no   evidence for stenosis  There was trace regurgitation  AORTIC VALVE: The valve was trileaflet  Leaflets exhibited normal thickness    and   normal cuspal separation  DOPPLER: Transaortic velocity could not be   determined  There was no evidence for stenosis  There was no significant   regurgitation  TRICUSPID VALVE: The valve structure was normal  There was normal leaflet   separation  DOPPLER: The transtricuspid velocity was within the normal range  There was no evidence for stenosis   There was mild regurgitation  Estimated   peak PA pressure was 68 mmHg  The findings suggest moderate to severe    pulmonary   hypertension  PULMONIC VALVE: Leaflets exhibited normal thickness, no calcification, and   normal cuspal separation  DOPPLER: Transpulmonic velocity could not be   determined  There was trace regurgitation  PERICARDIUM: There was no pericardial effusion  The pericardium was normal in   appearance  AORTA: Not well visualized  SYSTEMIC VEINS: IVC: The inferior vena cava was dilated  Respirophasic changes   in dimension were absent  TRANSTHORACIC ECHOCARDIOGRAM   Patient: Opal Phillips   MR number: [id number]    ------ Page 3   SYSTEM MEASUREMENT TABLES   2D   LA Area: 14 47 cm2   LVEDV MOD A4C: 143 37 ml   LVEF MOD A4C: 63 47 %   LVESV MOD A4C: 52 38 ml   LVLd A4C: 8 64 cm   LVLs A4C: 7 19 cm   RA Area: 17 82 cm2   RVIDd: 4 32 cm   SV MOD A4C: 91 ml   CW   TR Vmax: 3 64 m/s   TR maxP 1 mmHg   MM   TAPSE: 2 43 cm   PW   E': 0 05 m/s   E/E': 11 95   MV A Jose: 0 8 m/s   MV Dec Cochran: 2 7 m/s2   MV DecT: 218 26 ms   MV E Jose: 0 59 m/s   MV E/A Ratio: 0 73   MV PHT: 63 3 ms   MVA By PHT: 3 48 cm2   [employer] Accredited Echocardiography Laboratory   Prepared and electronically signed by   Alia Sal MD   Signed 11-Sep-2015 16:33:09      [address] [facility]   Phone: [phone]      INVASIVE CARDIOVASCULAR LAB COMPLETE REPORT         Patient: Opal Phillips   Location: [facility] Room: [facility]   MR number: [id number]   Account number: [de-identified]   Study date: 09/10/2015   Gender: Male   : 1945   Height: 72 in   Weight: 241 8 lb   BSA: 2 31 m squared   Allergies: NKA   Diagnostic Cardiologist:  Ryan Mahmood MD   Primary Physician:  Joyce Benz MD   SUMMARY   CORONARY CIRCULATION:   Left main: Normal    1st diagonal: There was a discrete 50 % stenosis at the ostium of the vessel   segment     CARDIAC STRUCTURES:   Analysis of regional contractile function demonstrated mild diaphragmatic   hypokinesis  base only Global left ventricular function was normal  EF   calculated by contrast ventriculography was 60 %  INDICATIONS:   --  Possible CAD: unstable angina, CCS class IV  PROCEDURES PERFORMED   --  Left heart catheterization with ventriculography  --  Left coronary angiography  --  Right coronary angiography  --  Coronary Catheterization (w/ LHC)  PROCEDURE: The risks and alternatives of the procedures and conscious sedation   were explained to the patient and informed consent was obtained  The patient   was brought to the cath lab and placed on the table  The planned puncture    sites   were prepped and draped in the usual sterile fashion  --  Right femoral artery access  The puncture site was infiltrated with local   INVASIVE CARDIOVASCULAR LAB COMPLETE REPORT   Patient: Ami Bell   MR number: [id number]    ------ Page 2   BSA: 2 31 m squared   anesthetic  The vessel was accessed using the modified Seldinger technique, a   wire was advanced into the vessel, and a sheath was advanced over the wire    into   the vessel  --  Left heart catheterization with ventriculography  A catheter was advanced   over a guidewire into the ascending aorta  After recording ascending aortic   pressure, the catheter was advanced across the aortic valve and left   ventricular pressure was recorded  Ventriculography was performed  The    catheter   was pulled back across the aortic valve and into the ascending aorta and   pullback pressures were obtained  --  Left coronary artery angiography  A catheter was advanced over a guidewire   into the aorta and positioned in the left coronary artery ostium under   fluoroscopic guidance  Angiography was performed  --  Right coronary artery angiography   A catheter was advanced over a    guidewire   into the aorta and positioned in the right coronary artery ostium under   fluoroscopic guidance  Angiography was performed  --  Coronary Catheterization (/ [facility])  PROCEDURE COMPLETION: The patient tolerated the procedure well and was   discharged from the cath lab  TIMING: Test started at 11:39  Test concluded at   12:19  HEMOSTASIS: The sheath was removed  The site was compressed manually  Hemostasis was obtained  MEDICATIONS GIVEN: Fentanyl (1OOmcg/2 ml), 50 mcg,    IV,   at 11:45  Versed (2mg/2ml), 1 mg, IV, at 11:45  1% Lidocaine, 9 ml,   subcutaneously, at 11:47  Fentanyl (1OOmcg/2 ml), 50 mcg, IV, at 11:57  Versed   (2mg/2ml), 1 mg, IV, at 11:57  CONTRAST GIVEN: 72 ml Omnipaque (350mg I /ml)  75 ml Omnipaque (350mg I /ml)  RADIATION EXPOSURE: Fluoroscopy time: 3 9 min  HEMODYNAMICS: Hemodynamic assessment demonstrated normal LVEDP  VENTRICLES:   --  Analysis of regional contractile function demonstrated mild   diaphragmatic hypokinesis  base only Global left ventricular function was   normal  EF calculated by contrast ventriculography was 60 %  VALVES:   AORTIC VALVE:   --  There was no aortic stenosis  MITRAL VALVE:   --  The mitral valve exhibited trivial regurgitation (less    than   1+)  CORONARY VESSELS:   --  The coronary circulation is right dominant  --  Left main: Normal    --  LAD: The vessel was medium to large sized and mildly calcified  Angiography   showed the vessel to wrap around the cardiac apex and mild atherosclerosis  There were three major diagonal branches  --  Proximal LAD: There was a 30 % stenosis at the origin of D1    --  1st diagonal: The vessel was medium sized  There was a discrete 50 %   stenosis at the ostium of the vessel segment  In a second lesion, there was a    0   % stenosis at the site of a prior stent  --  Circumflex: The vessel was small     Imaging: I have personally reviewed pertinent reports      EKG: NSR RBBB  VTE Prophylaxis: Enoxaparin (Lovenox)    Code Status: Level 1 - Full Code  Advance Directive and Living Will:      Power of :    POLST:      Counseling / Coordination of Care  Total floor / unit time spent today 45 minutes  Greater than 50% of total time was spent with the patient and / or family counseling and / or coordination of care

## 2017-09-29 NOTE — CONSULTS
Consultation - [address] [facility]  Dennise Chapin  81 y o  male MRN: [id number]  Unit/Bed#: [address] 826-01 Encounter: [id number]        Inpatient consult to gastroenterology  Consult performed by: Sebastian Phillips ordered by: Natalia Portillo          Reason for Consult / Principal Problem: abdominal pain, duodenitis, heme positive stool    HPI: 62-year-old male with CAD and MI s/p stent placement, diabetes mellitus, COPD on 3 L home O2 presenting for evaluation of abdominal pain  Patient reports acute onset diffuse abdominal pain 3 days ago  He describes the pain as severe and cramping  He has associated nausea and decreased PO intake  He denies fever and chills  No change in bowel habits  He had a BM yesterday morning  He denies having similar symptoms in the past  He denies history of ulcers  He does have chronic GERD but does not take acid reducing medication at home  He underwent exploratory laparoscopy last year which was unremarkable  Surgical history is also significant for appendectomy many years ago  He denies family history of GI or liver malignancies  He is a former tobacco smoker but denies alcohol and drug use  He denies past EGD  Last colonoscopy was 1 year ago and unremarkable, per patient and wife  Of note, he recently had positive FOBT and was scheduled to see [address] [place] [address] in [place], [place] today  REVIEW OF SYSTEMS:    CONSTITUTIONAL: Denies any fever, chills  + Poor appetite and PO intake  HEENT: No earache or tinnitus  Denies hearing loss or visual disturbances  CARDIOVASCULAR: No chest pain or palpitations  RESPIRATORY: Denies any cough, hemoptysis, shortness of breath or dyspnea on exertion  GASTROINTESTINAL: As noted in the History of Present Illness  GENITOURINARY: No problems with urination  Denies any hematuria or dysuria  NEUROLOGIC: No dizziness or vertigo, denies headaches  MUSCULOSKELETAL: + Back pain  SKIN: Denies skin rashes or itching  ENDOCRINE: Denies excessive thirst  Denies intolerance to heat or cold  PSYCHOSOCIAL: Denies depression or anxiety  Denies any recent memory loss         Historical Information   Past Medical History:   Diagnosis Date    Cardiac disease     COPD (chronic obstructive pulmonary disease)     Coronary artery disease     Diabetes mellitus     Hyperlipidemia     Hypertension     MI (myocardial infarction)     with 3 stents    Prostate cancer      Past Surgical History:   Procedure Laterality Date    ABDOMINAL SURGERY      exploratory    PROSTATE SURGERY       Social History   History   Alcohol Use No     History   Drug Use No     History   Smoking Status    Former Smoker    Packs/day: 1 00   Smokeless Tobacco    Never Used     Family History   Problem Relation Age of Onset    Heart disease Father        Meds/Allergies     Prescriptions Prior to Admission   Medication    acetaminophen (TYLENOL) 325 mg tablet    albuterol (2 5 mg/3 mL) 0 083 % nebulizer solution    cyclobenzaprine (FLEXERIL) 10 mg tablet    gabapentin (NEURONTIN) 300 mg capsule    insulin regular (HUMULIN R) 500 units/mL CONCENTRATED injection    lisinopril (ZESTRIL) 40 mg tablet    metoprolol tartrate (LOPRESSOR) 25 mg tablet    Omega-3 Fatty Acids (FISH OIL) 1200 MG CAPS    oxyCODONE-acetaminophen (PERCOCET) 7 5-325 MG per tablet    simvastatin (ZOCOR) 40 mg tablet    tiotropium (SPIRIVA) 18 mcg inhalation capsule    glipiZIDE (GLUCOTROL) 5 mg tablet     Current Facility-Administered Medications   Medication Dose Route Frequency    albuterol inhalation solution 2 5 mg  2 5 mg Nebulization Q4H PRN    cyclobenzaprine (FLEXERIL) tablet 10 mg  10 mg Oral HS    dextrose 5 % and sodium chloride 0 9 % infusion  75 mL/hr Intravenous Continuous    enoxaparin (LOVENOX) subcutaneous injection 40 mg  40 mg Subcutaneous Daily    gabapentin (NEURONTIN) capsule 300 mg  300 mg Oral TID    insulin lispro (HumaLOG) 100 units/mL subcutaneous injection 1-5 Units  1-5 Units Subcutaneous TID AC    lidocaine (LIDODERM) 5 % patch 1 patch  1 patch Transdermal Daily    Menthol lozenge 5 4 mg  1 lozenge Mouth/Throat Q2H PRN    metoprolol (LOPRESSOR) injection 5 mg  5 mg Intravenous Q6H    morphine injection 2 mg  2 mg Intravenous Q3H PRN    ondansetron (ZOFRAN) injection 4 mg  4 mg Intravenous Q6H PRN    pantoprazole (PROTONIX) injection 40 mg  40 mg Intravenous Q12H [address]    phenol (CHLORASEPTIC) 1 4 % mucosal liquid 2 spray  2 spray Mouth/Throat Q2H PRN    sodium chloride 0 9 % infusion  125 mL/hr Intravenous Continuous    tiotropium (SPIRIVA) capsule for inhaler 18 mcg  18 mcg Inhalation Daily       Allergies   Allergen Reactions    Metformin            Objective     Blood pressure 127/67, pulse 91, temperature 98 3 °F (36 8 °C), temperature source Oral, resp  rate 16, height 6' (1 829 m), weight 127 kg (280 lb), SpO2 97 %  Intake/Output Summary (Last 24 hours) at 09/29/17 1143  Last data filed at 09/29/17 1001   Gross per 24 hour   Intake           1127 5 ml   Output             1900 ml   Net           -772 5 ml         PHYSICAL EXAM:      General Appearance:   Alert obese male, cooperative, no distress, appears stated age    HEENT:   Normocephalic, atraumatic  + Nasal cannula  + NGT with bilious drainage  Neck:  Supple, symmetrical, trachea midline, no adenopathy    Lungs:   Clear to auscultation bilaterally; no rales, rhonchi or wheezing; respirations unlabored    Heart[de-identified]   S1 and S2 normal; regular rate and rhythm; no murmur, rub, or gallop  Abdomen:   Obese  Mild distention  Normal bowel sounds  Soft  Mild diffuse tenderness to palpation  No rebound or guarding     Genitalia:   Deferred    Rectal:   Deferred    Extremities:  No cyanosis, clubbing or edema    Pulses:  2+ and symmetric all extremities    Skin:  Skin color, texture, turgor normal, no rashes or lesions    Lymph nodes:  No palpable cervical lymphadenopathy  Lab Results:     Results from last 7 days  Lab Units 09/29/17  0453  09/28/17  1207   WBC Thousand/uL 13 13*  --  15 16*   HEMOGLOBIN g/dL 11 2*  --  11 3*   HEMATOCRIT % 38 9  --  37 8   PLATELETS Thousands/uL 230  < > 268   NEUTROS PCT %  --   --  87*   LYMPHS PCT %  --   --  8*   MONOS PCT %  --   --  5   EOS PCT %  --   --  0   < > = values in this interval not displayed  Results from last 7 days  Lab Units 09/29/17  0453 09/28/17  1207   SODIUM mmol/L 141 138   POTASSIUM mmol/L 5 1 5 3   CHLORIDE mmol/L 106 105   CO2 mmol/L 29 26   BUN mg/dL 51* 51*   CREATININE mg/dL 1 49* 1 58*   CALCIUM mg/dL 9 1 9 2   TOTAL PROTEIN g/dL  --  7 5   BILIRUBIN TOTAL mg/dL  --  0 35   ALK PHOS U/L  --  75   ALT U/L  --  23   AST U/L  --  20   GLUCOSE RANDOM mg/dL 172* 246*       Results from last 7 days  Lab Units 09/28/17  1207   INR  1 11       Results from last 7 days  Lab Units 09/28/17  1207   LIPASE u/L 89       Imaging Studies: I have personally reviewed pertinent imaging studies  Xr Chest Pa & Lateral    Result Date: 9/28/2017  Impression: Small right pleural effusion  Workstation performed: [id number]     Ct Abdomen Pelvis With Contrast    Result Date: 9/28/2017  Impression: The duodenum has an inflamed appearance suggesting acute duodenitis  The stomach is quite distended with gas and fluid  Could not entirely exclude the possibility of at least a partial gastric outlet obstruction  Correlate for p o  tolerance  There is also a duodenal diverticulum  Gallstones or sludge without evidence of acute cholecystitis  Small right pleural effusion  Stable minimal lung base nodularity  Slightly increased interstitial prominence of the right lung  Correlate for any pulmonary symptoms   ##imslh##imslh Workstation performed: [id number]       ASSESSMENT and PLAN:      Discussed with SLIM    53-year-old male with CAD and MI s/p stent placement, diabetes mellitus, COPD on 3 L home O2 presenting for evaluation of acute abdominal pain and nausea  1) Acute abdominal pain and nausea likely 2/2 partial gastric outlet obstruction: Patient reported 3 days of diffuse abdominal pain, nausea, and poor PO intake  Labs significant for MARANDA and leukocytosis  CT abdomen/pelvis showed distended stomach containing gas and fluid with possible acute duodenitis  He could have severe duodenitis or large peptic ulcer causing partial obstruction  Cannot rule out malignancy     -Continue NPO with NGT for now   -Continue PPI BID  -Continue supportive care  -Monitor abdominal exam  -Plan for EGD on Monday to further assess    Patient was seen and examined by Dr Paris Arciniega  All key medical decisions were made by Dr Paris Arciniega  Thank you for allowing us to participate in the care of this present patient  We will follow-up with you closely

## 2017-09-29 NOTE — ASSESSMENT & PLAN NOTE
Assessment:  Appreciate further GI recommendations    After discussion with GI P A-C patient may be plan for EGD and patient or he may just follow-up in the office for this    Plan:  Management as above

## 2017-09-30 LAB
ALBUMIN SERPL BCP-MCNC: 2.7 G/DL (ref 3.5–5)
ALP SERPL-CCNC: 67 U/L (ref 46–116)
ALT SERPL W P-5'-P-CCNC: 14 U/L (ref 12–78)
ANION GAP SERPL CALCULATED.3IONS-SCNC: 4 MMOL/L (ref 4–13)
AST SERPL W P-5'-P-CCNC: 11 U/L (ref 5–45)
BASOPHILS # BLD AUTO: 0.01 THOUSANDS/ΜL (ref 0–0.1)
BASOPHILS NFR BLD AUTO: 0 % (ref 0–1)
BILIRUB SERPL-MCNC: 0.25 MG/DL (ref 0.2–1)
BUN SERPL-MCNC: 33 MG/DL (ref 5–25)
CALCIUM SERPL-MCNC: 8.7 MG/DL (ref 8.3–10.1)
CHLORIDE SERPL-SCNC: 108 MMOL/L (ref 100–108)
CO2 SERPL-SCNC: 31 MMOL/L (ref 21–32)
CREAT SERPL-MCNC: 1.29 MG/DL (ref 0.6–1.3)
EOSINOPHIL # BLD AUTO: 0.01 THOUSAND/ΜL (ref 0–0.61)
EOSINOPHIL NFR BLD AUTO: 0 % (ref 0–6)
ERYTHROCYTE [DISTWIDTH] IN BLOOD BY AUTOMATED COUNT: 16.8 % (ref 11.6–15.1)
GFR SERPL CREATININE-BSD FRML MDRD: 56 ML/MIN/1.73SQ M
GLUCOSE SERPL-MCNC: 260 MG/DL (ref 65–140)
GLUCOSE SERPL-MCNC: 282 MG/DL (ref 65–140)
GLUCOSE SERPL-MCNC: 293 MG/DL (ref 65–140)
GLUCOSE SERPL-MCNC: 302 MG/DL (ref 65–140)
GLUCOSE SERPL-MCNC: 303 MG/DL (ref 65–140)
HCT VFR BLD AUTO: 36.2 % (ref 36.5–49.3)
HGB BLD-MCNC: 11 G/DL (ref 12–17)
LYMPHOCYTES # BLD AUTO: 0.91 THOUSANDS/ΜL (ref 0.6–4.47)
LYMPHOCYTES NFR BLD AUTO: 5 % (ref 14–44)
MCH RBC QN AUTO: 24.1 PG (ref 26.8–34.3)
MCHC RBC AUTO-ENTMCNC: 30.4 G/DL (ref 31.4–37.4)
MCV RBC AUTO: 79 FL (ref 82–98)
MONOCYTES # BLD AUTO: 2.09 THOUSAND/ΜL (ref 0.17–1.22)
MONOCYTES NFR BLD AUTO: 12 % (ref 4–12)
NEUTROPHILS # BLD AUTO: 13.75 THOUSANDS/ΜL (ref 1.85–7.62)
NEUTS SEG NFR BLD AUTO: 83 % (ref 43–75)
NRBC BLD AUTO-RTO: 0 /100 WBCS
PLATELET # BLD AUTO: 216 THOUSANDS/UL (ref 149–390)
PMV BLD AUTO: 9.6 FL (ref 8.9–12.7)
POTASSIUM SERPL-SCNC: 4.9 MMOL/L (ref 3.5–5.3)
PROT SERPL-MCNC: 7.5 G/DL (ref 6.4–8.2)
RBC # BLD AUTO: 4.56 MILLION/UL (ref 3.88–5.62)
SODIUM SERPL-SCNC: 143 MMOL/L (ref 136–145)
WBC # BLD AUTO: 16.82 THOUSAND/UL (ref 4.31–10.16)

## 2017-09-30 PROCEDURE — 82948 REAGENT STRIP/BLOOD GLUCOSE: CPT

## 2017-09-30 PROCEDURE — 85025 COMPLETE CBC W/AUTO DIFF WBC: CPT | Performed by: PHYSICIAN ASSISTANT

## 2017-09-30 PROCEDURE — 94760 N-INVAS EAR/PLS OXIMETRY 1: CPT

## 2017-09-30 PROCEDURE — C9113 INJ PANTOPRAZOLE SODIUM, VIA: HCPCS | Performed by: HOSPITALIST

## 2017-09-30 PROCEDURE — 80053 COMPREHEN METABOLIC PANEL: CPT | Performed by: PHYSICIAN ASSISTANT

## 2017-09-30 RX ORDER — LORAZEPAM 0.5 MG/1
0.5 TABLET ORAL ONCE
Status: COMPLETED | OUTPATIENT
Start: 2017-09-30 | End: 2017-09-30

## 2017-09-30 RX ORDER — INSULIN GLARGINE 100 [IU]/ML
20 INJECTION, SOLUTION SUBCUTANEOUS
Status: DISCONTINUED | OUTPATIENT
Start: 2017-09-30 | End: 2017-10-01

## 2017-09-30 RX ADMIN — TIOTROPIUM BROMIDE 18 MCG: 18 CAPSULE ORAL; RESPIRATORY (INHALATION) at 22:31

## 2017-09-30 RX ADMIN — METOPROLOL TARTRATE 5 MG: 5 INJECTION INTRAVENOUS at 05:27

## 2017-09-30 RX ADMIN — LORAZEPAM 0.5 MG: 0.5 TABLET ORAL at 03:06

## 2017-09-30 RX ADMIN — LIDOCAINE 1 PATCH: 50 PATCH CUTANEOUS at 09:58

## 2017-09-30 RX ADMIN — ENOXAPARIN SODIUM 40 MG: 40 INJECTION SUBCUTANEOUS at 09:58

## 2017-09-30 RX ADMIN — CEFAZOLIN SODIUM 1000 MG: 1 SOLUTION INTRAVENOUS at 01:17

## 2017-09-30 RX ADMIN — INSULIN LISPRO 6 UNITS: 100 INJECTION, SOLUTION INTRAVENOUS; SUBCUTANEOUS at 22:32

## 2017-09-30 RX ADMIN — MORPHINE SULFATE 2 MG: 2 INJECTION, SOLUTION INTRAMUSCULAR; INTRAVENOUS at 09:59

## 2017-09-30 RX ADMIN — INSULIN GLARGINE 20 UNITS: 100 INJECTION, SOLUTION SUBCUTANEOUS at 22:31

## 2017-09-30 RX ADMIN — METOPROLOL TARTRATE 5 MG: 5 INJECTION INTRAVENOUS at 09:58

## 2017-09-30 RX ADMIN — CYCLOBENZAPRINE HYDROCHLORIDE 10 MG: 10 TABLET, FILM COATED ORAL at 22:30

## 2017-09-30 RX ADMIN — PANTOPRAZOLE SODIUM 40 MG: 40 INJECTION, POWDER, FOR SOLUTION INTRAVENOUS at 09:58

## 2017-09-30 RX ADMIN — INSULIN LISPRO 3 UNITS: 100 INJECTION, SOLUTION INTRAVENOUS; SUBCUTANEOUS at 09:57

## 2017-09-30 RX ADMIN — PANTOPRAZOLE SODIUM 40 MG: 40 INJECTION, POWDER, FOR SOLUTION INTRAVENOUS at 22:30

## 2017-09-30 RX ADMIN — METOPROLOL TARTRATE 5 MG: 5 INJECTION INTRAVENOUS at 17:30

## 2017-09-30 RX ADMIN — CEFAZOLIN SODIUM 1000 MG: 1 SOLUTION INTRAVENOUS at 09:57

## 2017-09-30 RX ADMIN — DEXTROSE AND SODIUM CHLORIDE 75 ML/HR: 5; .9 INJECTION, SOLUTION INTRAVENOUS at 22:35

## 2017-09-30 RX ADMIN — MORPHINE SULFATE 2 MG: 2 INJECTION, SOLUTION INTRAMUSCULAR; INTRAVENOUS at 01:07

## 2017-09-30 RX ADMIN — METOPROLOL TARTRATE 5 MG: 5 INJECTION INTRAVENOUS at 23:03

## 2017-09-30 RX ADMIN — INSULIN LISPRO 3 UNITS: 100 INJECTION, SOLUTION INTRAVENOUS; SUBCUTANEOUS at 13:36

## 2017-09-30 RX ADMIN — GABAPENTIN 300 MG: 300 CAPSULE ORAL at 22:45

## 2017-09-30 RX ADMIN — CEFAZOLIN SODIUM 1000 MG: 1 SOLUTION INTRAVENOUS at 17:31

## 2017-09-30 RX ADMIN — MENTHOL 5.4 MG: 5.4 LOZENGE ORAL at 01:05

## 2017-09-30 RX ADMIN — INSULIN LISPRO 3 UNITS: 100 INJECTION, SOLUTION INTRAVENOUS; SUBCUTANEOUS at 17:32

## 2017-09-30 NOTE — PROGRESS NOTES
Progress Note - General Surgery   Isaías Simpson Sr  81 y o  male MRN: [id number]  [facility] Encounter: [id number]    Assessment:  82 yo M with abdominal pain and nausea suggestive of possible gastric outlet obstruction    Plan:  NPO/NGT  Glucose management per endocrine and primary  EGD on Monday per GI  FU Echo  Management per cardiology  DVT Ppx per primary  PRN Pain control    Subjective/Objective     Subjective: Nausea overnight; NGT w/ 400    Objective:    Blood pressure 156/74, pulse 84, temperature 99 °F (37 2 °C), temperature source Oral, resp  rate 18, height 6' (1 829 m), weight 127 kg (280 lb), SpO2 92 %  ,Body mass index is 37 97 kg/m²        Intake/Output Summary (Last 24 hours) at 09/30/17 1231  Last data filed at 09/30/17 1100   Gross per 24 hour   Intake               50 ml   Output             2350 ml   Net            -2300 ml       Invasive Devices     Peripheral Intravenous Line            Peripheral IV 09/28/17 Left Antecubital 2 days          Drain            NG/OG/Enteral Tube Nasogastric 18 Fr Left mouth 1 day                Physical Exam:   General: NAD  Lungs: No audible wheezing  Heart: No audible murmurs  Abdomen: mildly tender throughout, distended, no peritonitic signs      Results from last 7 days  Lab Units 09/30/17  0544 09/29/17  2108 09/29/17  0453 09/28/17  1730 09/28/17  1207   WBC Thousand/uL 16 82*  --  13 13*  --  15 16*   HEMOGLOBIN g/dL 11 0* 11 1* 11 2*  --  11 3*   HEMATOCRIT % 36 2* 37 6 38 9  --  37 8   PLATELETS Thousands/uL 216  --  230 239 268       Results from last 7 days  Lab Units 09/30/17  0544 09/29/17  0453 09/28/17  1207   SODIUM mmol/L 143 141 138   POTASSIUM mmol/L 4 9 5 1 5 3   CHLORIDE mmol/L 108 106 105   CO2 mmol/L 31 29 26   BUN mg/dL 33* 51* 51*   CREATININE mg/dL 1 29 1 49* 1 58*   GLUCOSE RANDOM mg/dL 260* 172* 246*   CALCIUM mg/dL 8 7 9 1 9 2       Results from last 7 days  Lab Units 09/28/17  1207   INR  1 11   PTT seconds 30

## 2017-09-30 NOTE — PROGRESS NOTES
Due to patient's slight change in mental status  A blood sugar was attempted  Patient refused blood sugar check from the PCA, covering his bracelet stating " You are not getting anything from me"

## 2017-09-30 NOTE — PROGRESS NOTES
Patient threatening to leave AMA and remove NG tube  Advised the patient that would not be good for his diagnosis and should wait until later this morning to speak with his physicians regarding his care and feeling towards admission  Patient requested wife be called and asked to come to hospital  Patient showing signs of slight confusion asking is he is in the Ed, which is not his baseline  Paged SLIM to notify; Spoke with Mariela Zayas PA-C  Will increase rounding on patient

## 2017-09-30 NOTE — PROGRESS NOTES
Progress Note - Cardiology   Rosalba Black  81 y o  male MRN: [id number]  [facility] Encounter: [id number]  09/30/17  11:57 AM        Subjective/Objective   Chief Complaint:   Chief Complaint   Patient presents with    Abdominal Pain     Pt complaining of general abdominal pain that started yesterday  Denies any nausea, vomiting, diarrah, fevers  Subjective:  Confused, abdominal discomfort present    Objective:  Confused, denies any chest pains or shortness of breath    Patient Active Problem List   Diagnosis    Coronary artery disease involving native coronary artery without angina pectoris    Gastroesophageal reflux disease without esophagitis    Obesity (BMI 30-39  9)    Dyslipidemia    H/O prostate cancer    Back pain    Type 2 diabetes mellitus with hyperglycemia    Venous stasis dermatitis of both lower extremities    Opioid dependence    Chronic respiratory failure with hypoxia    COPD (chronic obstructive pulmonary disease)    Bilateral lower extremity edema    Hyponatremia    CKD (chronic kidney disease)    Partial gastric outlet obstruction    Duodenitis       Vitals: /74   Pulse 84   Temp 99 °F (37 2 °C) (Oral)   Resp 18   Ht 6' (1 829 m)   Wt 127 kg (280 lb)   SpO2 92%   BMI 37 97 kg/m²     I/O this shift:  In: -   Out: 600 [Urine:600]  Wt Readings from Last 3 Encounters:   09/28/17 127 kg (280 lb)   07/23/17 127 kg (280 lb)   07/17/17 127 kg (280 lb)       Intake/Output Summary (Last 24 hours) at 09/30/17 1157  Last data filed at 09/30/17 1100   Gross per 24 hour   Intake               50 ml   Output             2350 ml   Net            -2300 ml     I/O last 3 completed shifts: In: 1177 5 [P O :420; I V :707 5;  IV Piggyback:50]  Out: 5574 [Urine:2450; Emesis/NG output:1200]    Invasive Devices     Peripheral Intravenous Line            Peripheral IV 09/28/17 Left Antecubital 2 days          Drain            NG/OG/Enteral Tube Nasogastric 18 Fr Left mouth 1 day                  Physical Exam:  GEN: Mario Dia Sr  appears well, alert and oriented x 3, pleasant and cooperative , morbidly obese  HEENT: pupils equal, round, and reactive to light; extraocular muscles intact, NG tube in place  NECK: supple, no carotid bruits or JVD  HEART: regular rhythm, normal S1 and S2, no murmurs, clicks, gallops or rubs   LUNGS: clear to auscultation bilaterally; no wheezes, rales, or rhonchi   ABDOMEN/GI: normal bowel sounds, soft, no tenderness, positive for distention  EXTREMITIES/Musculoskeltal: peripheral pulses normal; no clubbing, cyanosis, or edema  NEURO: no focal findings   SKIN: normal without suspicious lesions on exposed skin            Lab Results:     Results from last 7 days  Lab Units 09/28/17 2005 09/28/17  1730   TROPONIN I ng/mL 0 10* 0 10*     Results from last 7 days  Lab Units 09/30/17 0544 09/29/17 2108 09/29/17 0453 09/28/17  1730 09/28/17  1207   WBC Thousand/uL 16 82*  --  13 13*  --  15 16*   HEMOGLOBIN g/dL 11 0* 11 1* 11 2*  --  11 3*   HEMATOCRIT % 36 2* 37 6 38 9  --  37 8   PLATELETS Thousands/uL 216  --  230 239 268           Results from last 7 days  Lab Units 09/30/17 0544 09/29/17 0453 09/28/17  1207   SODIUM mmol/L 143 141 138   POTASSIUM mmol/L 4 9 5 1 5 3   CHLORIDE mmol/L 108 106 105   CO2 mmol/L 31 29 26   BUN mg/dL 33* 51* 51*   CREATININE mg/dL 1 29 1 49* 1 58*   CALCIUM mg/dL 8 7 9 1 9 2   TOTAL PROTEIN g/dL 7 5  --  7 5   BILIRUBIN TOTAL mg/dL 0 25  --  0 35   ALK PHOS U/L 67  --  75   ALT U/L 14  --  23   AST U/L 11  --  20   GLUCOSE RANDOM mg/dL 260* 172* 246*     Results from last 7 days  Lab Units 09/28/17  1207   INR  1 11       Imaging: I have personally reviewed pertinent reports      EKG:  No events on telemetry    Scheduled Meds:    cefazolin 1,000 mg Intravenous Q8H   cyclobenzaprine 10 mg Oral HS   enoxaparin 40 mg Subcutaneous Daily   gabapentin 300 mg Oral HS   insulin lispro 1-5 Units Subcutaneous TID AC insulin lispro 2-12 Units Subcutaneous HS   lidocaine 1 patch Transdermal Daily   metoprolol 5 mg Intravenous Q6H   pantoprazole 40 mg Intravenous Q12H [address]   tiotropium 18 mcg Inhalation Daily     Continuous Infusions:    dextrose 5 % and sodium chloride 0 9 % 75 mL/hr Last Rate: 75 mL/hr (09/29/17 0257)   sodium chloride 125 mL/hr Last Rate: Stopped (09/28/17 8964)       VTE Pharmacologic Prophylaxis: Enoxaparin (Lovenox)  VTE Mechanical Prophylaxis: sequential compression device          81year-old with history of coronary artery disease, prior stenting of the right coronary artery and diagonal 1, last coronary [id number] new obstructive disease, stents were patent, hypertension, diabetes, chronic diastolic CHF, on Lasix 20 mg every other day at home, morbid obesity, admitted with abdominal discomfort-from a partial gastric outlet obstruction  Modest improvement if at all as per patient and his wife        Not sure why troponins were checked but they were mildly elevated  Patient denies any shortness of breath or his baseline  His wife is in agreement        Plan:     Troponin elevation:  Minimal, and flat pattern, up to 0 1, this is nonspecific troponin elevation, I would not even categorize this as a type 2 NSTEMI  No further evaluation     Elevated proBNP, three thousand:  No symptoms of congestive heart failure, watch for volume overload with IV fluids  Okay for hydration considering that he is NPO and has a gastric outlet obstruction with minimal improvement so far     Prior history of right ventricular dilatation:  From pulmonary hypertension, likely secondary to sleep apnea, obesity and chronic hypertension  No further evaluation as an inpatient    Outpatient follow-up with an echo will suffice     Coronary artery disease:  Prior NSTEMI and DYAN-RCA and diagonal-last coronary angiography-2015, no further evaluation at this time, continue beta-blocker - IV currently, p  o  beta blocker once he is taking medications p o  His wife put prefer having a p r n  benzodiazepine for agitation, defer to primary team    Counseling / Coordination of Care  Total time spent 20 minutes including teaching and family updates  More than 50% was spent counseling pt and family

## 2017-09-30 NOTE — PROGRESS NOTES
Progress Note - Hoda Keyes Sr  81 y o  male MRN: [id number]    Unit/Bed#: [facility] 826-01 Encounter: [id number]    ASSESSMENT and PLAN:      1) Acute abdominal pain and nausea likely secondary to partial gastric outlet obstruction: Patient reported 3 days of diffuse abdominal pain, nausea, which has improved since insertion of NGT  He denies any nausea or abdominal pain today  He is having BMs  CT abdomen/pelvis showed distended stomach containing gas and fluid with possible acute duodenitis  This could be secondary to duodenitis, PUD leading to partial obstruction  Cannot rule out malignancy  Surgery is following   -Continue NPO   -continue NGT  -Continue PPI BID  -Continue supportive care  -Monitor abdominal exam  -Plan for EGD on Monday for further evaluation     Subjective:     Patient seen and examined    Objective:     Vitals: Blood pressure 156/74, pulse 84, temperature 99 °F (37 2 °C), temperature source Oral, resp  rate 18, height 6' (1 829 m), weight 127 kg (280 lb), SpO2 92 %  ,Body mass index is 37 97 kg/m²        Intake/Output Summary (Last 24 hours) at 09/30/17 1327  Last data filed at 09/30/17 1100   Gross per 24 hour   Intake               50 ml   Output             1950 ml   Net            -1900 ml       Physical Exam:     General Appearance: Alert, appears stated age and cooperative, NGT in place  Lungs: Clear to auscultation bilaterally, no rales or rhonchi, no labored breathing/accessory muscle use  Heart: Regular rate and rhythm, S1, S2 normal, no murmur, click, rub or gallop  Abdomen: Soft, non-tender, non-distended; bowel sounds normal; no masses or no organomegaly  Extremities: No cyanosis, edema    Invasive Devices     Peripheral Intravenous Line            Peripheral IV 09/28/17 Left Antecubital 2 days          Drain            NG/OG/Enteral Tube Nasogastric 18 Fr Left mouth 1 day                Lab Results:      Results from last 7 days  Lab Units 09/30/17  0544   WBC Thousand/uL 16 82* HEMOGLOBIN g/dL 11 0*   HEMATOCRIT % 36 2*   PLATELETS Thousands/uL 216   NEUTROS PCT % 83*   LYMPHS PCT % 5*   MONOS PCT % 12   EOS PCT % 0       Results from last 7 days  Lab Units 09/30/17  0544   SODIUM mmol/L 143   POTASSIUM mmol/L 4 9   CHLORIDE mmol/L 108   CO2 mmol/L 31   BUN mg/dL 33*   CREATININE mg/dL 1 29   CALCIUM mg/dL 8 7   TOTAL PROTEIN g/dL 7 5   BILIRUBIN TOTAL mg/dL 0 25   ALK PHOS U/L 67   ALT U/L 14   AST U/L 11   GLUCOSE RANDOM mg/dL 260*       Results from last 7 days  Lab Units 09/28/17  1207   INR  1 11       Results from last 7 days  Lab Units 09/28/17  1207   LIPASE u/L 89       Imaging Studies: I have personally reviewed pertinent imaging studies  Xr Chest Pa & Lateral    Result Date: 9/28/2017  Impression: Small right pleural effusion  Ct Abdomen Pelvis With Contrast    Result Date: 9/28/2017  Impression: The duodenum has an inflamed appearance suggesting acute duodenitis  The stomach is quite distended with gas and fluid  Could not entirely exclude the possibility of at least a partial gastric outlet obstruction  Correlate for p o  tolerance  There is also a duodenal diverticulum  Gallstones or sludge without evidence of acute cholecystitis  Small right pleural effusion  Stable minimal lung base nodularity  Slightly increased interstitial prominence of the right lung  Correlate for any pulmonary symptoms

## 2017-09-30 NOTE — PROGRESS NOTES
[facility] Progress Note  Patient: Favian Kaur  81 y o  male   MRN: [id number]  PCP: Joyce Benz MD  Unit/Bed#: [facility] 826-01 Encounter: [id number]  Date Of Visit: 09/30/17    Assessment:    Principal Problem:    Partial gastric outlet obstruction  Active Problems:    Coronary artery disease involving native coronary artery without angina pectoris    Gastroesophageal reflux disease without esophagitis    Obesity (BMI 30-39  9)    Dyslipidemia    Type 2 diabetes mellitus with hyperglycemia    Chronic respiratory failure with hypoxia    COPD (chronic obstructive pulmonary disease)    CKD (chronic kidney disease)    Duodenitis      Plan:    Partial gastric outlet obstruction  Exact reason not clear  Possible duodenitis vs an ulcer vs underlying malignancy vs decreased gastric motility due to underlying diabetes  Continue NGT  GI and surgery following  Plan for EGD on Monday  NPO  IV fluids    Acute encephalopathy:  Most likely due to due to delirium, no other signs of infection  Continue one-to-one for now  P r n  Ativan    Possible Left lower extremity cellulitis:  Started on Ancef  Erythema improved today  Has leukocytosis of 16 today and low-grade fever  Continue to monitor    Acute on chronic kidney disease:  Most likely pretty  Resolved with IV fluids  Creatinine 1 29 today from 1 4 yesterday  Continue IV fluids  Continue to monitor with daily BMP    History of coronary artery disease status post stenting in 2015 with mild troponin elevaBold  Patient currently chest pain-free  Patient evaluated by Cardiology  Elevated troponin as nonspecific  Will not trend for now  Chronic diastolic heart failure:  Patient currently euvolemic and asymptomatic  Takes Lasix every other day at home, currently held  Continue IV fluids at 75 cc/hour  Monitor closely for volume overload      COPD:  Not in acute exacerbation  Continue Spiriva and p r n  albuterol          VTE Pharmacologic Prophylaxis: Pharmacologic: Enoxaparin (Lovenox)  Mechanical VTE Prophylaxis in Place: Yes    Patient Centered Rounds: I have performed bedside rounds with nursing staff today  Discussions with Specialists or Other Care Team Provider:  GI, Cardiology    Education and Discussions with Family / Patient:  Patient and wife at bedside    Time Spent for Care: 45 minutes  More than 50% of total time spent on counseling and coordination of care as described above  Current Length of Stay: 2 day(s)    Current Patient Status: Inpatient   Certification Statement: The patient will continue to require additional inpatient hospital stay due to Partial gastric outlet obstruction    Discharge Plan / Estimated Discharge Date:  Pending EGD on Monday    Code Status: Level 1 - Full Code      Subjective:   Pt seen and examined by me this morning  Pt denied any complaints  Is frustrated with NG tube  No abdominal pain, nausea, vomiting, chest pain or shortness of breath  Objective:     Vitals:   Temp (24hrs), Av 2 °F (31 2 °C), Min:37 4 °F (3 °C), Max:99 °F (37 2 °C)    HR:  [] 84  Resp:  [16-20] 18  BP: (108-161)/(56-74) 156/74  SpO2:  [92 %-95 %] 92 %  Body mass index is 37 97 kg/m²  Input and Output Summary (last 24 hours): Intake/Output Summary (Last 24 hours) at 17 1348  Last data filed at 17 1100   Gross per 24 hour   Intake               50 ml   Output             1950 ml   Net            -1900 ml       Physical Exam:     Physical Exam   Constitutional: He is oriented to person, place, and time  No distress  Morbidly obese   HENT:   Head: Normocephalic and atraumatic  Eyes: EOM are normal    Cardiovascular: Normal rate, regular rhythm and normal heart sounds  Exam reveals no gallop and no friction rub  No murmur heard  Pulmonary/Chest: Effort normal and breath sounds normal  No respiratory distress  He has no wheezes  He has no rales  Abdominal: Soft   Bowel sounds are normal  He exhibits no distension  There is no tenderness  There is no rebound  Musculoskeletal:   Left leg no erythema today   Neurological: He is alert and oriented to person, place, and time  Skin: Skin is warm  Psychiatric: He has a normal mood and affect  Additional Data:     Labs:      Results from last 7 days  Lab Units 09/30/17  0544   WBC Thousand/uL 16 82*   HEMOGLOBIN g/dL 11 0*   HEMATOCRIT % 36 2*   PLATELETS Thousands/uL 216   NEUTROS PCT % 83*   LYMPHS PCT % 5*   MONOS PCT % 12   EOS PCT % 0       Results from last 7 days  Lab Units 09/30/17  0544   SODIUM mmol/L 143   POTASSIUM mmol/L 4 9   CHLORIDE mmol/L 108   CO2 mmol/L 31   BUN mg/dL 33*   CREATININE mg/dL 1 29   CALCIUM mg/dL 8 7   TOTAL PROTEIN g/dL 7 5   BILIRUBIN TOTAL mg/dL 0 25   ALK PHOS U/L 67   ALT U/L 14   AST U/L 11   GLUCOSE RANDOM mg/dL 260*       Results from last 7 days  Lab Units 09/28/17  1207   INR  1 11       * I Have Reviewed All Lab Data Listed Above  * Additional Pertinent Lab Tests Reviewed: [facility] Admission Reviewed    Imaging:    Imaging Reports Reviewed Today Include:   Imaging Personally Reviewed by Myself Includes:      Recent Cultures (last 7 days):           Last 24 Hours Medication List:     cefazolin 1,000 mg Intravenous Q8H   cyclobenzaprine 10 mg Oral HS   enoxaparin 40 mg Subcutaneous Daily   gabapentin 300 mg Oral HS   insulin lispro 1-5 Units Subcutaneous TID AC   insulin lispro 2-12 Units Subcutaneous HS   lidocaine 1 patch Transdermal Daily   metoprolol 5 mg Intravenous Q6H   pantoprazole 40 mg Intravenous Q12H [address]   tiotropium 18 mcg Inhalation Daily        Today, Patient Was Seen By: Awa Solares MD    ** Please Note: This note has been constructed using a voice recognition system   **

## 2017-09-30 NOTE — PROGRESS NOTES
Spoke with Davin Laboy Pa-C regarding patients increased agitation and confusion  Patient attempting to take out NG tube, stating he wants to be discharged  Tried to calm patient and explain importance of tube and staying in the hospital until the doctors come and see him  Pt continues to exhibit same behaviors  1 to 1 order placed along with PO Ativan  Will attempt to call patients wife again

## 2017-10-01 PROBLEM — E87.0 HYPERNATREMIA: Status: ACTIVE | Noted: 2017-10-01

## 2017-10-01 LAB
ANION GAP SERPL CALCULATED.3IONS-SCNC: 4 MMOL/L (ref 4–13)
BUN SERPL-MCNC: 27 MG/DL (ref 5–25)
CALCIUM SERPL-MCNC: 8.8 MG/DL (ref 8.3–10.1)
CHLORIDE SERPL-SCNC: 111 MMOL/L (ref 100–108)
CO2 SERPL-SCNC: 33 MMOL/L (ref 21–32)
CREAT SERPL-MCNC: 1.28 MG/DL (ref 0.6–1.3)
ERYTHROCYTE [DISTWIDTH] IN BLOOD BY AUTOMATED COUNT: 17 % (ref 11.6–15.1)
GFR SERPL CREATININE-BSD FRML MDRD: 56 ML/MIN/1.73SQ M
GLUCOSE SERPL-MCNC: 253 MG/DL (ref 65–140)
GLUCOSE SERPL-MCNC: 268 MG/DL (ref 65–140)
GLUCOSE SERPL-MCNC: 270 MG/DL (ref 65–140)
GLUCOSE SERPL-MCNC: 298 MG/DL (ref 65–140)
GLUCOSE SERPL-MCNC: 300 MG/DL (ref 65–140)
HCT VFR BLD AUTO: 35.8 % (ref 36.5–49.3)
HGB BLD-MCNC: 10.5 G/DL (ref 12–17)
MCH RBC QN AUTO: 23.6 PG (ref 26.8–34.3)
MCHC RBC AUTO-ENTMCNC: 29.3 G/DL (ref 31.4–37.4)
MCV RBC AUTO: 81 FL (ref 82–98)
PLATELET # BLD AUTO: 190 THOUSANDS/UL (ref 149–390)
PMV BLD AUTO: 9.7 FL (ref 8.9–12.7)
POTASSIUM SERPL-SCNC: 4.1 MMOL/L (ref 3.5–5.3)
RBC # BLD AUTO: 4.44 MILLION/UL (ref 3.88–5.62)
SODIUM SERPL-SCNC: 148 MMOL/L (ref 136–145)
WBC # BLD AUTO: 11.92 THOUSAND/UL (ref 4.31–10.16)

## 2017-10-01 PROCEDURE — 85027 COMPLETE CBC AUTOMATED: CPT | Performed by: INTERNAL MEDICINE

## 2017-10-01 PROCEDURE — 80048 BASIC METABOLIC PNL TOTAL CA: CPT | Performed by: INTERNAL MEDICINE

## 2017-10-01 PROCEDURE — C9113 INJ PANTOPRAZOLE SODIUM, VIA: HCPCS | Performed by: HOSPITALIST

## 2017-10-01 PROCEDURE — 82948 REAGENT STRIP/BLOOD GLUCOSE: CPT

## 2017-10-01 RX ORDER — INSULIN GLARGINE 100 [IU]/ML
40 INJECTION, SOLUTION SUBCUTANEOUS
Status: DISCONTINUED | OUTPATIENT
Start: 2017-10-01 | End: 2017-10-02

## 2017-10-01 RX ORDER — DEXTROSE MONOHYDRATE 50 MG/ML
75 INJECTION, SOLUTION INTRAVENOUS CONTINUOUS
Status: DISCONTINUED | OUTPATIENT
Start: 2017-10-01 | End: 2017-10-03

## 2017-10-01 RX ADMIN — METOPROLOL TARTRATE 5 MG: 5 INJECTION INTRAVENOUS at 14:08

## 2017-10-01 RX ADMIN — CEFAZOLIN SODIUM 1000 MG: 1 SOLUTION INTRAVENOUS at 00:59

## 2017-10-01 RX ADMIN — MORPHINE SULFATE 2 MG: 2 INJECTION, SOLUTION INTRAMUSCULAR; INTRAVENOUS at 00:18

## 2017-10-01 RX ADMIN — TIOTROPIUM BROMIDE 18 MCG: 18 CAPSULE ORAL; RESPIRATORY (INHALATION) at 22:12

## 2017-10-01 RX ADMIN — CEFAZOLIN SODIUM 1000 MG: 1 SOLUTION INTRAVENOUS at 18:22

## 2017-10-01 RX ADMIN — INSULIN LISPRO 4 UNITS: 100 INJECTION, SOLUTION INTRAVENOUS; SUBCUTANEOUS at 14:06

## 2017-10-01 RX ADMIN — DEXTROSE 75 ML/HR: 5 SOLUTION INTRAVENOUS at 14:04

## 2017-10-01 RX ADMIN — METOPROLOL TARTRATE 5 MG: 5 INJECTION INTRAVENOUS at 22:25

## 2017-10-01 RX ADMIN — LIDOCAINE 1 PATCH: 50 PATCH CUTANEOUS at 09:11

## 2017-10-01 RX ADMIN — INSULIN GLARGINE 40 UNITS: 100 INJECTION, SOLUTION SUBCUTANEOUS at 22:11

## 2017-10-01 RX ADMIN — CYCLOBENZAPRINE HYDROCHLORIDE 10 MG: 10 TABLET, FILM COATED ORAL at 22:11

## 2017-10-01 RX ADMIN — GABAPENTIN 300 MG: 300 CAPSULE ORAL at 22:11

## 2017-10-01 RX ADMIN — METOPROLOL TARTRATE 5 MG: 5 INJECTION INTRAVENOUS at 18:22

## 2017-10-01 RX ADMIN — MORPHINE SULFATE 2 MG: 2 INJECTION, SOLUTION INTRAMUSCULAR; INTRAVENOUS at 22:09

## 2017-10-01 RX ADMIN — INSULIN LISPRO 3 UNITS: 100 INJECTION, SOLUTION INTRAVENOUS; SUBCUTANEOUS at 18:23

## 2017-10-01 RX ADMIN — PANTOPRAZOLE SODIUM 40 MG: 40 INJECTION, POWDER, FOR SOLUTION INTRAVENOUS at 09:12

## 2017-10-01 RX ADMIN — INSULIN LISPRO 8 UNITS: 100 INJECTION, SOLUTION INTRAVENOUS; SUBCUTANEOUS at 22:25

## 2017-10-01 RX ADMIN — INSULIN LISPRO 2 UNITS: 100 INJECTION, SOLUTION INTRAVENOUS; SUBCUTANEOUS at 09:15

## 2017-10-01 RX ADMIN — MENTHOL 5.4 MG: 5.4 LOZENGE ORAL at 18:22

## 2017-10-01 RX ADMIN — INSULIN LISPRO 2 UNITS: 100 INJECTION, SOLUTION INTRAVENOUS; SUBCUTANEOUS at 14:09

## 2017-10-01 RX ADMIN — CEFAZOLIN SODIUM 1000 MG: 1 SOLUTION INTRAVENOUS at 09:11

## 2017-10-01 RX ADMIN — PANTOPRAZOLE SODIUM 40 MG: 40 INJECTION, POWDER, FOR SOLUTION INTRAVENOUS at 22:10

## 2017-10-01 RX ADMIN — ENOXAPARIN SODIUM 40 MG: 40 INJECTION SUBCUTANEOUS at 09:12

## 2017-10-01 RX ADMIN — METOPROLOL TARTRATE 5 MG: 5 INJECTION INTRAVENOUS at 05:53

## 2017-10-01 NOTE — PROGRESS NOTES
Progress Note - Arsh Pfeiffer Sr  81 y o  male MRN: [id number]    Unit/Bed#: [facility] 826-01 Encounter: [id number]      CC: diabetes f/u    Subjective:   Quinton Hilario  is a 81y o  year old male with type 2 diabetes  Feels well  No complaints  No hypoglycemia  He is NPO due to gastric outlet obstruction  Objective:     Vitals: Blood pressure 150/79, pulse 88, temperature 98 3 °F (36 8 °C), temperature source Oral, resp  rate 18, height 6' (1 829 m), weight 127 kg (280 lb), SpO2 96 %  ,Body mass index is 37 97 kg/m²  Intake/Output Summary (Last 24 hours) at 10/01/17 1013  Last data filed at 10/01/17 0501   Gross per 24 hour   Intake          1584 17 ml   Output             2750 ml   Net         -1165 83 ml       Physical Exam:  General Appearance: awake, appears stated age and cooperative  Head: Normocephalic, without obvious abnormality, atraumatic  Extremities: moves all extremities  Skin: Skin color and temperature normal    Pulm: no labored breathing  NG tube in place    Lab, Imaging and other studies: I have personally reviewed pertinent reports  POC Glucose (mg/dl)   Date Value   10/01/2017 268 (H)   09/30/2017 293 (H)   09/30/2017 303 (H)   09/30/2017 282 (H)   09/30/2017 302 (H)   09/29/2017 236 (H)   09/29/2017 251 (H)   09/29/2017 183 (H)   09/29/2017 181 (H)   09/28/2017 225 (H)       Assessment:  diabetes with hyperglycemia and gastric outlet obstruction    Plan:  1  Diabetes with hyperglycemia-increase Lantus to 40 units  Continue to monitor blood sugar over time and make adjustments to the regimen if necessary  2   Gastric outlet obstruction-EGD is scheduled for tomorrow  Portions of the record may have been created with voice recognition software  Occasional wrong word or "sound a like" substitutions may have occurred due to the inherent limitations of voice recognition software  Read the chart carefully and recognize, using context, where substitutions have occurred

## 2017-10-01 NOTE — PROGRESS NOTES
[facility] Progress Note  Patient: Chiquita Wolff  81 y o  male   MRN: [id number]  PCP: Heavenly Jimenez MD  Unit/Bed#: [facility] 826-01 Encounter: [id number]  Date Of Visit: 10/01/17    Assessment:    Principal Problem:    Partial gastric outlet obstruction  Active Problems:    Coronary artery disease involving native coronary artery without angina pectoris    Gastroesophageal reflux disease without esophagitis    Obesity (BMI 30-39  9)    Dyslipidemia    Type 2 diabetes mellitus with hyperglycemia    Chronic respiratory failure with hypoxia    COPD (chronic obstructive pulmonary disease)    CKD (chronic kidney disease)    Duodenitis    Hypernatremia      Plan:    Partial gastric outlet obstruction  Possible duodenitis vs an ulcer vs underlying malignancy vs gastroparesis  Continue NGT  GI and surgery following  Plan for EGD 10/2   NPO  IV fluids     Acute encephalopathy:  Most likely due to due to delirium, no other signs of infection  Continue one-to-one until NGT removed       Possible Left lower extremity cellulitis:  Clinically improving  Ancef day 3  No signs of sepsis     MARANDA/CKD3:  Resolved with IV fluids  At baseline, Cr 1 3    HyperNa:  I switched IVF to D5W  Recheck BMP tomorrow     History of coronary artery disease status post stenting in 2015 with indeterminate troponin  Echo showed normal EF  C/w IV BB - switch to PO when NGT removed     Chronic diastolic heart failure:  Patient currently euvolemic and asymptomatic  Takes Lasix every other day at home, currently held  Continue IV fluids at 75 cc/hour  Monitor closely for volume overload  On BB     COPD:  Not in acute exacerbation  Continue Spiriva and p r n  Albuterol    FEN:  Pt NPO      Plan for NGT to be removed tomorrow and to be put on CLD  Nutrition consulted in case NGT cannot be pulled, in which case pt will likely need PICC and TPN      VTE Pharmacologic Prophylaxis:   Pharmacologic: Enoxaparin (Lovenox)  Mechanical VTE Prophylaxis in Place: Yes    Patient Centered Rounds: I have performed bedside rounds with nursing staff today  Discussions with Specialists or Other Care Team Provider: d/w GI    Education and Discussions with Family / Patient: pt and wife    Time Spent for Care: 30 minutes  More than 50% of total time spent on counseling and coordination of care as described above  Current Length of Stay: 3 day(s)    Current Patient Status: Inpatient   Certification Statement: The patient will continue to require additional inpatient hospital stay due to need for EGD    Discharge Plan / Estimated Discharge Date: n/a    Code Status: Level 1 - Full Code      Subjective:   Pt seen and examined  Says he was nauseous o/n but this has since resolved  Objective:     Vitals:   Temp (24hrs), Av 4 °F (36 9 °C), Min:98 1 °F (36 7 °C), Max:98 6 °F (37 °C)    HR:  [86-95] 86  Resp:  [18] 18  BP: (150-163)/(66-79) 156/66  SpO2:  [93 %-96 %] 95 %  Body mass index is 37 97 kg/m²  Input and Output Summary (last 24 hours): Intake/Output Summary (Last 24 hours) at 10/01/17 1605  Last data filed at 10/01/17 1445   Gross per 24 hour   Intake          1584 17 ml   Output             2750 ml   Net         -1165 83 ml       Physical Exam:     Physical Exam   Constitutional: He is oriented to person, place, and time  He appears well-developed and well-nourished  HENT:   Head: Normocephalic and atraumatic  NGT in place   Eyes: Conjunctivae and EOM are normal    Neck: Normal range of motion  Neck supple  Cardiovascular: Normal rate and regular rhythm  Pulmonary/Chest: Effort normal and breath sounds normal    Abdominal: Soft  Bowel sounds are normal  He exhibits no distension  There is no tenderness  Musculoskeletal: Normal range of motion  He exhibits no edema  Neurological: He is alert and oriented to person, place, and time  Skin: Skin is warm and dry           Additional Data:     Labs:      Results from last 7 days  Lab Units 10/01/17  0505 09/30/17  0544   WBC Thousand/uL 11 92* 16 82*   HEMOGLOBIN g/dL 10 5* 11 0*   HEMATOCRIT % 35 8* 36 2*   PLATELETS Thousands/uL 190 216   NEUTROS PCT %  --  83*   LYMPHS PCT %  --  5*   MONOS PCT %  --  12   EOS PCT %  --  0       Results from last 7 days  Lab Units 10/01/17  0505 09/30/17  0544   SODIUM mmol/L 148* 143   POTASSIUM mmol/L 4 1 4 9   CHLORIDE mmol/L 111* 108   CO2 mmol/L 33* 31   BUN mg/dL 27* 33*   CREATININE mg/dL 1 28 1 29   CALCIUM mg/dL 8 8 8 7   TOTAL PROTEIN g/dL  --  7 5   BILIRUBIN TOTAL mg/dL  --  0 25   ALK PHOS U/L  --  67   ALT U/L  --  14   AST U/L  --  11   GLUCOSE RANDOM mg/dL 253* 260*       Results from last 7 days  Lab Units 09/28/17  1207   INR  1 11       * I Have Reviewed All Lab Data Listed Above  * Additional Pertinent Lab Tests Reviewed: All [facility] Admission Reviewed        Recent Cultures (last 7 days):           Last 24 Hours Medication List:     cefazolin 1,000 mg Intravenous Q8H   cyclobenzaprine 10 mg Oral HS   enoxaparin 40 mg Subcutaneous Daily   gabapentin 300 mg Oral HS   insulin glargine 40 Units Subcutaneous HS   insulin lispro 1-5 Units Subcutaneous TID AC   insulin lispro 2-12 Units Subcutaneous HS   lidocaine 1 patch Transdermal Daily   metoprolol 5 mg Intravenous Q6H   pantoprazole 40 mg Intravenous Q12H DRAKE   tiotropium 18 mcg Inhalation Daily        Today, Patient Was Seen By: Colonel Joseph DO    ** Please Note: This note has been constructed using a voice recognition system   **

## 2017-10-01 NOTE — PROGRESS NOTES
Progress Note - General Surgery   Saqib Hyatt Sr  81 y o  male MRN: [id number]  Unit/Bed#: [facility] 826-01 Encounter: [id number]    Assessment:  82 yo M with abdominal pain and nausea suggestive of possible gastric outlet obstruction; NGT w/ 1300 cc    Plan:  NPO/NGT  Glucose management per endocrine and primary  EGD on Monday per GI  PRN Pain control    Subjective/Objective     Subjective: Nausea overnight; NGT w/ 1300 out    Objective:    Blood pressure 150/79, pulse 88, temperature 98 3 °F (36 8 °C), temperature source Oral, resp  rate 18, height 6' (1 829 m), weight 127 kg (280 lb), SpO2 96 %  ,Body mass index is 37 97 kg/m²        Intake/Output Summary (Last 24 hours) at 10/01/17 0924  Last data filed at 10/01/17 0501   Gross per 24 hour   Intake          1584 17 ml   Output             2750 ml   Net         -1165 83 ml       Invasive Devices     Peripheral Intravenous Line            Peripheral IV 09/28/17 Left Antecubital 2 days          Drain            NG/OG/Enteral Tube Nasogastric 18 Fr Left mouth 2 days                Physical Exam:   General: NAD  Lungs: No audible wheezing  Heart: No audible murmurs  Abdomen: mildly tender throughout, distended, no peritonitic signs      Results from last 7 days  Lab Units 10/01/17  0505 09/30/17  0544 09/29/17  2108 09/29/17  0453   WBC Thousand/uL 11 92* 16 82*  --  13 13*   HEMOGLOBIN g/dL 10 5* 11 0* 11 1* 11 2*   HEMATOCRIT % 35 8* 36 2* 37 6 38 9   PLATELETS Thousands/uL 190 216  --  230       Results from last 7 days  Lab Units 10/01/17  0505 09/30/17  0544 09/29/17  0453   SODIUM mmol/L 148* 143 141   POTASSIUM mmol/L 4 1 4 9 5 1   CHLORIDE mmol/L 111* 108 106   CO2 mmol/L 33* 31 29   BUN mg/dL 27* 33* 51*   CREATININE mg/dL 1 28 1 29 1 49*   GLUCOSE RANDOM mg/dL 253* 260* 172*   CALCIUM mg/dL 8 8 8 7 9 1       Results from last 7 days  Lab Units 09/28/17  1207   INR  1 11   PTT seconds 30

## 2017-10-02 ENCOUNTER — ANESTHESIA EVENT (INPATIENT)
Dept: GASTROENTEROLOGY | Facility: HOSPITAL | Age: 72
DRG: 380 | End: 2017-10-02
Payer: COMMERCIAL

## 2017-10-02 ENCOUNTER — GENERIC CONVERSION - ENCOUNTER (OUTPATIENT)
Dept: OTHER | Facility: OTHER | Age: 72
End: 2017-10-02

## 2017-10-02 ENCOUNTER — ANESTHESIA (INPATIENT)
Dept: GASTROENTEROLOGY | Facility: HOSPITAL | Age: 72
DRG: 380 | End: 2017-10-02
Payer: COMMERCIAL

## 2017-10-02 PROBLEM — D62 ACUTE BLOOD LOSS ANEMIA: Status: ACTIVE | Noted: 2017-10-02

## 2017-10-02 PROBLEM — K26.0 ACUTE DUODENAL ULCER WITH BLEEDING: Status: ACTIVE | Noted: 2017-10-02

## 2017-10-02 LAB
ALBUMIN SERPL BCP-MCNC: 2.4 G/DL (ref 3.5–5)
ALP SERPL-CCNC: 76 U/L (ref 46–116)
ALT SERPL W P-5'-P-CCNC: 14 U/L (ref 12–78)
ANION GAP SERPL CALCULATED.3IONS-SCNC: 6 MMOL/L (ref 4–13)
AST SERPL W P-5'-P-CCNC: 21 U/L (ref 5–45)
BILIRUB SERPL-MCNC: 0.29 MG/DL (ref 0.2–1)
BUN SERPL-MCNC: 30 MG/DL (ref 5–25)
CALCIUM SERPL-MCNC: 8.6 MG/DL (ref 8.3–10.1)
CHLORIDE SERPL-SCNC: 107 MMOL/L (ref 100–108)
CO2 SERPL-SCNC: 34 MMOL/L (ref 21–32)
CREAT SERPL-MCNC: 1.24 MG/DL (ref 0.6–1.3)
ERYTHROCYTE [DISTWIDTH] IN BLOOD BY AUTOMATED COUNT: 16.9 % (ref 11.6–15.1)
GFR SERPL CREATININE-BSD FRML MDRD: 59 ML/MIN/1.73SQ M
GLUCOSE SERPL-MCNC: 225 MG/DL (ref 65–140)
GLUCOSE SERPL-MCNC: 268 MG/DL (ref 65–140)
GLUCOSE SERPL-MCNC: 341 MG/DL (ref 65–140)
GLUCOSE SERPL-MCNC: 375 MG/DL (ref 65–140)
GLUCOSE SERPL-MCNC: 386 MG/DL (ref 65–140)
HCT VFR BLD AUTO: 24.7 % (ref 36.5–49.3)
HCT VFR BLD AUTO: 34 % (ref 36.5–49.3)
HGB BLD-MCNC: 7.3 G/DL (ref 12–17)
HGB BLD-MCNC: 9.8 G/DL (ref 12–17)
MAGNESIUM SERPL-MCNC: 2.4 MG/DL (ref 1.6–2.6)
MCH RBC QN AUTO: 23.3 PG (ref 26.8–34.3)
MCHC RBC AUTO-ENTMCNC: 28.8 G/DL (ref 31.4–37.4)
MCV RBC AUTO: 81 FL (ref 82–98)
PHOSPHATE SERPL-MCNC: 2.6 MG/DL (ref 2.3–4.1)
PLATELET # BLD AUTO: 203 THOUSANDS/UL (ref 149–390)
PMV BLD AUTO: 10.3 FL (ref 8.9–12.7)
POTASSIUM SERPL-SCNC: 3.9 MMOL/L (ref 3.5–5.3)
PREALB SERPL-MCNC: 10.2 MG/DL (ref 18–40)
PROT SERPL-MCNC: 6.8 G/DL (ref 6.4–8.2)
RBC # BLD AUTO: 4.21 MILLION/UL (ref 3.88–5.62)
SODIUM SERPL-SCNC: 147 MMOL/L (ref 136–145)
WBC # BLD AUTO: 11.43 THOUSAND/UL (ref 4.31–10.16)

## 2017-10-02 PROCEDURE — 0W3P8ZZ CONTROL BLEEDING IN GASTROINTESTINAL TRACT, VIA NATURAL OR ARTIFICIAL OPENING ENDOSCOPIC: ICD-10-PCS | Performed by: INTERNAL MEDICINE

## 2017-10-02 PROCEDURE — 85027 COMPLETE CBC AUTOMATED: CPT | Performed by: GENERAL PRACTICE

## 2017-10-02 PROCEDURE — 83735 ASSAY OF MAGNESIUM: CPT | Performed by: GENERAL PRACTICE

## 2017-10-02 PROCEDURE — C9113 INJ PANTOPRAZOLE SODIUM, VIA: HCPCS | Performed by: HOSPITALIST

## 2017-10-02 PROCEDURE — 82948 REAGENT STRIP/BLOOD GLUCOSE: CPT

## 2017-10-02 PROCEDURE — 84134 ASSAY OF PREALBUMIN: CPT | Performed by: SURGERY

## 2017-10-02 PROCEDURE — 80053 COMPREHEN METABOLIC PANEL: CPT | Performed by: GENERAL PRACTICE

## 2017-10-02 PROCEDURE — 85014 HEMATOCRIT: CPT | Performed by: SURGERY

## 2017-10-02 PROCEDURE — 88313 SPECIAL STAINS GROUP 2: CPT | Performed by: INTERNAL MEDICINE

## 2017-10-02 PROCEDURE — 88305 TISSUE EXAM BY PATHOLOGIST: CPT | Performed by: INTERNAL MEDICINE

## 2017-10-02 PROCEDURE — 88342 IMHCHEM/IMCYTCHM 1ST ANTB: CPT | Performed by: INTERNAL MEDICINE

## 2017-10-02 PROCEDURE — 0DB68ZX EXCISION OF STOMACH, VIA NATURAL OR ARTIFICIAL OPENING ENDOSCOPIC, DIAGNOSTIC: ICD-10-PCS | Performed by: INTERNAL MEDICINE

## 2017-10-02 PROCEDURE — 84100 ASSAY OF PHOSPHORUS: CPT | Performed by: GENERAL PRACTICE

## 2017-10-02 PROCEDURE — 85018 HEMOGLOBIN: CPT | Performed by: SURGERY

## 2017-10-02 PROCEDURE — C9113 INJ PANTOPRAZOLE SODIUM, VIA: HCPCS | Performed by: INTERNAL MEDICINE

## 2017-10-02 RX ORDER — ONDANSETRON 2 MG/ML
INJECTION INTRAMUSCULAR; INTRAVENOUS AS NEEDED
Status: DISCONTINUED | OUTPATIENT
Start: 2017-10-02 | End: 2017-10-02 | Stop reason: SURG

## 2017-10-02 RX ORDER — CALCIUM CHLORIDE 100 MG/ML
INJECTION INTRAVENOUS; INTRAVENTRICULAR AS NEEDED
Status: DISCONTINUED | OUTPATIENT
Start: 2017-10-02 | End: 2017-10-02 | Stop reason: SURG

## 2017-10-02 RX ORDER — SUCCINYLCHOLINE CHLORIDE 20 MG/ML
INJECTION INTRAMUSCULAR; INTRAVENOUS AS NEEDED
Status: DISCONTINUED | OUTPATIENT
Start: 2017-10-02 | End: 2017-10-02 | Stop reason: SURG

## 2017-10-02 RX ORDER — LIDOCAINE HYDROCHLORIDE 10 MG/ML
INJECTION, SOLUTION INFILTRATION; PERINEURAL AS NEEDED
Status: DISCONTINUED | OUTPATIENT
Start: 2017-10-02 | End: 2017-10-02 | Stop reason: SURG

## 2017-10-02 RX ORDER — SODIUM CHLORIDE, SODIUM LACTATE, POTASSIUM CHLORIDE, CALCIUM CHLORIDE 600; 310; 30; 20 MG/100ML; MG/100ML; MG/100ML; MG/100ML
INJECTION, SOLUTION INTRAVENOUS CONTINUOUS PRN
Status: DISCONTINUED | OUTPATIENT
Start: 2017-10-02 | End: 2017-10-02 | Stop reason: SURG

## 2017-10-02 RX ORDER — PROPOFOL 10 MG/ML
INJECTION, EMULSION INTRAVENOUS AS NEEDED
Status: DISCONTINUED | OUTPATIENT
Start: 2017-10-02 | End: 2017-10-02 | Stop reason: SURG

## 2017-10-02 RX ADMIN — GABAPENTIN 300 MG: 300 CAPSULE ORAL at 21:15

## 2017-10-02 RX ADMIN — PANTOPRAZOLE SODIUM 40 MG: 40 INJECTION, POWDER, FOR SOLUTION INTRAVENOUS at 08:44

## 2017-10-02 RX ADMIN — SUCCINYLCHOLINE CHLORIDE 125 MG: 20 INJECTION, SOLUTION INTRAMUSCULAR; INTRAVENOUS at 11:44

## 2017-10-02 RX ADMIN — TIOTROPIUM BROMIDE 18 MCG: 18 CAPSULE ORAL; RESPIRATORY (INHALATION) at 21:16

## 2017-10-02 RX ADMIN — PROPOFOL 30 MG: 10 INJECTION, EMULSION INTRAVENOUS at 11:23

## 2017-10-02 RX ADMIN — CYCLOBENZAPRINE HYDROCHLORIDE 10 MG: 10 TABLET, FILM COATED ORAL at 21:15

## 2017-10-02 RX ADMIN — SODIUM CHLORIDE, SODIUM LACTATE, POTASSIUM CHLORIDE, AND CALCIUM CHLORIDE: .6; .31; .03; .02 INJECTION, SOLUTION INTRAVENOUS at 11:05

## 2017-10-02 RX ADMIN — PROPOFOL 200 MG: 10 INJECTION, EMULSION INTRAVENOUS at 11:44

## 2017-10-02 RX ADMIN — INSULIN LISPRO 16 UNITS: 100 INJECTION, SOLUTION INTRAVENOUS; SUBCUTANEOUS at 21:16

## 2017-10-02 RX ADMIN — LIDOCAINE HYDROCHLORIDE 50 MG: 10 INJECTION, SOLUTION INFILTRATION; PERINEURAL at 11:44

## 2017-10-02 RX ADMIN — CEFAZOLIN SODIUM 1000 MG: 1 SOLUTION INTRAVENOUS at 02:12

## 2017-10-02 RX ADMIN — DEXAMETHASONE SODIUM PHOSPHATE 10 MG: 10 INJECTION INTRAMUSCULAR; INTRAVENOUS at 12:32

## 2017-10-02 RX ADMIN — SODIUM CHLORIDE 8 MG/HR: 9 INJECTION, SOLUTION INTRAVENOUS at 18:16

## 2017-10-02 RX ADMIN — LIDOCAINE 1 PATCH: 50 PATCH CUTANEOUS at 08:44

## 2017-10-02 RX ADMIN — INSULIN HUMAN 45 UNITS: 500 INJECTION, SOLUTION SUBCUTANEOUS at 17:37

## 2017-10-02 RX ADMIN — ENOXAPARIN SODIUM 40 MG: 40 INJECTION SUBCUTANEOUS at 08:44

## 2017-10-02 RX ADMIN — METOPROLOL TARTRATE 5 MG: 5 INJECTION INTRAVENOUS at 05:10

## 2017-10-02 RX ADMIN — CEFAZOLIN SODIUM 1000 MG: 1 SOLUTION INTRAVENOUS at 08:45

## 2017-10-02 RX ADMIN — CALCIUM CHLORIDE 0.3 G: 100 INJECTION PARENTERAL at 12:26

## 2017-10-02 RX ADMIN — PROPOFOL 50 MG: 10 INJECTION, EMULSION INTRAVENOUS at 11:19

## 2017-10-02 RX ADMIN — ONDANSETRON 4 MG: 2 INJECTION INTRAMUSCULAR; INTRAVENOUS at 12:31

## 2017-10-02 RX ADMIN — CALCIUM CHLORIDE 0.3 G: 100 INJECTION PARENTERAL at 12:16

## 2017-10-02 RX ADMIN — SODIUM CHLORIDE, SODIUM LACTATE, POTASSIUM CHLORIDE, AND CALCIUM CHLORIDE: .6; .31; .03; .02 INJECTION, SOLUTION INTRAVENOUS at 11:50

## 2017-10-02 RX ADMIN — INSULIN LISPRO 2 UNITS: 100 INJECTION, SOLUTION INTRAVENOUS; SUBCUTANEOUS at 08:53

## 2017-10-02 RX ADMIN — INSULIN LISPRO 25 UNITS: 100 INJECTION, SOLUTION INTRAVENOUS; SUBCUTANEOUS at 17:37

## 2017-10-02 RX ADMIN — CEFAZOLIN SODIUM 1000 MG: 1 SOLUTION INTRAVENOUS at 17:44

## 2017-10-02 RX ADMIN — METOPROLOL TARTRATE 25 MG: 25 TABLET ORAL at 21:15

## 2017-10-02 RX ADMIN — DEXTROSE 75 ML/HR: 5 SOLUTION INTRAVENOUS at 05:03

## 2017-10-02 NOTE — PROGRESS NOTES
Progress Note - Amrita Treadwell Sr  81 y o  male MRN: [id number]    Unit/Bed#: [facility] 826-01 Encounter: [id number]      CC: diabetes f/u    Subjective:   Toni Limon  is a 81y o  year old male with type 2 diabetes  Feels well  No complaints  No hypoglycemia  Started on clear liquid diet  Objective:     Vitals: Blood pressure 145/71, pulse 103, temperature 98 9 °F (37 2 °C), temperature source Tympanic, resp  rate 16, height 6' (1 829 m), weight 125 kg (275 lb), SpO2 100 %  ,Body mass index is 37 3 kg/m²  Intake/Output Summary (Last 24 hours) at 10/02/17 1516  Last data filed at 10/02/17 1419   Gross per 24 hour   Intake          1923 75 ml   Output             2070 ml   Net          -146 25 ml       Physical Exam:  General Appearance: awake, appears stated age and cooperative  Head: Normocephalic, without obvious abnormality, atraumatic  Extremities: moves all extremities  Skin: Skin color and temperature normal    Pulm: no labored breathing    Lab, Imaging and other studies: I have personally reviewed pertinent reports  POC Glucose (mg/dl)   Date Value   10/02/2017 341 (H)   10/02/2017 268 (H)   10/01/2017 300 (H)   10/01/2017 270 (H)   10/01/2017 298 (H)   10/01/2017 268 (H)   09/30/2017 293 (H)   09/30/2017 303 (H)   09/30/2017 282 (H)   09/30/2017 302 (H)       Assessment:  diabetes with hyperglycemia and multiple esophageal, gastric/duodenal ulcers  Plan:  1  Type 2 diabetes with hyperglycemia-start home regimen of U 500 regular insulin 55 units before breakfast, lunch and 45 units before dinner  Continue to monitor blood sugar over time and make adjustments to the regimen if necessary  Due to being on intensive insulin therapy, the patient is at high risk of severe hypoglycemia and potential death  2   Multiple gastrointestinal ulcers-management per gastroenterology  Portions of the record may have been created with voice recognition software

## 2017-10-02 NOTE — PLAN OF CARE

## 2017-10-02 NOTE — NUTRITION
10/02/17 1525   Recommendations/Interventions   Summary Patient remains NPO x4 days, patient having EGD today, +NGT for possible gastric outlet obstruction  Nutrition Recommendations Other (specify)  (If unable to safely advance diet to clear liquid with ensure clear TID within 48 hours suggest initiate standard PN for day one and increase to: 1000 ml 10% aa, 700 ml 50% dex, 350 ml 20% lipids   Monitor electrolytes and adjust accordingly  )

## 2017-10-02 NOTE — ANESTHESIA PREPROCEDURE EVALUATION
Review of Systems/Medical History  Patient summary reviewed    No history of anesthetic complications     Cardiovascular  Hyperlipidemia, Hypertension , Past MI , CAD, , Cardiac stents    Comment: 9/29/17: LEFT VENTRICLE:  Systolic function was vigorous by visual assessment  Ejection fraction was estimated to be 65 % ,  Pulmonary  COPD , ,        GI/Hepatic    GERD ,   Comment: Gastric outlet obstruction, NG tube in place          Endo/Other  Diabetes Insulin,      GYN       Hematology   Musculoskeletal  Obesity  morbid obesity, Back pain , chronic back pain,        Neurology   Psychology           Physical Exam    Airway    Mallampati score: II  TM Distance: >3 FB  Neck ROM: full     Dental       Cardiovascular      Pulmonary      Other Findings        Anesthesia Plan  ASA Score- 3       Anesthesia Type- IV sedation with anesthesia  Comment: Will suction NG tube prior to sedation: if actively draining will place ETT        Induction- intravenous      Informed Consent  Anesthetic plan and risks discussed with patient

## 2017-10-02 NOTE — ANESTHESIA POSTPROCEDURE EVALUATION
Post-Op Assessment Note      CV Status:  Stable    Mental Status:  Alert and awake    Hydration Status:  Stable    PONV Controlled:  None    Airway Patency:  Patent    Post Op Vitals Reviewed: Yes          Staff: CRNA       Comments: Pt  to Pacu  Report given  Course uneventful  VSS  Airway patent  Neuro  intact            /69 (10/02/17 1309)    Temp 98 9 °F (37 2 °C) (10/02/17 1309)    Pulse (!) 110 (10/02/17 1309)   Resp 13 (10/02/17 1309)    SpO2

## 2017-10-02 NOTE — OP NOTE
**** GI/ENDOSCOPY REPORT ****     PATIENT NAME: JORGE CALDERA - VISIT ID:     INTRODUCTION: Esophagogastroduodenoscopy - A 81 male patient presents for   an inpatient Esophagogastroduodenoscopy at [facility] (formerly [facility]  INDICATIONS: Gastric outlet obstrction  CONSENT: The benefits, risks, and alternatives to the procedure were   discussed and informed consent was obtained from the patient  PREPARATION:  EKG, pulse, pulse oximetry and blood pressure were monitored   throughout the procedure  ASA Classification: Class 3 - Patient has severe   systemic disturbance that may or may not be related to the disorder   requiring surgery  MEDICATIONS: Anesthesia-check records     PROCEDURE:  The endoscope was passed without difficulty through the mouth   under direct visualization and advanced to the 2nd portion of the   duodenum  The scope was withdrawn and the mucosa was carefully examined  FINDINGS:   Esophagus: There were 2 medium-sized, superficial ulcers in   the distal third of the esophagus  Stomach: Gastritis was found in the   body of the stomach  There was a superficial ulcer in the body of the   stomach  Two random biopsies was taken  Duodenum: There was evidence of   duodenitis in the duodenal bulb and 2nd portion of the duodenum  There   were a few ulcers in the 2nd portion of the duodenum and area of papilla  There was a single medium-sized ulcer in the 2nd portion of the duodenum  It was oozing blood and showed a visible vessel  To control bleeding, 3   injections of epinephrine (1:10,000) was injected, using 1 0 cc's per   injection; the total dosage was 3 cc's  To control bleeding, 3 clips was   applied (4th one was unsuccessful)  This area was adjacent to a   diverticulum and the biliary orifice was also adjacent to it  Care was   taken not to clip the area of the papilla  COMPLICATIONS: There were no complications       IMPRESSIONS: Ulcers found in the distal third of the esophagus  Gastritis   found in the body of the stomach  An ulcer found in the body of the   stomach  Ulcers found in the 2nd portion of the duodenum and area of   papilla  An ulcer found in the 2nd portion of the duodenum which had a   visible vessel and was oozing  Epinephrine injection therapy was applied   to control bleeding  Clips were applied to control bleeding  RECOMMENDATIONS: Start protonix drip for now  Follow hemoglobin levels   every 6 - 8 hours  Follow LFT's  Clear diet  Avoid NSAIDS  Follow up   biopsies  ESTIMATED BLOOD LOSS:     PATHOLOGY SPECIMENS: Two random biopsies taken  PROCEDURE CODES:     ICD-9 Codes: 535 50 Unspecified gastritides and gastroduodenitis, without   mention of hemorrhage 535 60 Duodenitis, without mention of hemorrhage     ICD-10 Codes: K22 1 Ulcer of esophagus K29 Gastritis and duodenitis K25   Gastric ulcer K29 Gastritis and duodenitis K26 Duodenal ulcer K26 Duodenal   ulcer     PERFORMED BY: ANUJ Benito  on 10/02/2017  Version 1, electronically signed by ANUJ Benito  on 10/02/2017 at   12:34

## 2017-10-02 NOTE — CASE MANAGEMENT
[address] [facility] in the Pending sale to [facility] by [address] 2017  Network Utilization Review Department  Phone: [phone]; Fax [phone]  ATTENTION: The Network Utilization Review Department is now centralized for our 7 Facilities  Make a note that we have a new phone and fax numbers for our Department  Please call with any questions or concerns to [phone] CSD carefully follow the prompts so that you are directed to the right person  All voicemails are confidential  Fax any determinations, approvals, denials, and requests for initial or continue stay review clinical to [phone]  Due to HIGH CALL volume, it would be easier if you could please send faxed requests to expedite your requests and in part, help us provide discharge notifications faster         Continued Stay Review     Date:  10/2/17   Monday ACUTE MED SURG LEVEL OF CARE    Vital Signs: /63   Pulse 100   Temp 97 5 °F (36 4 °C) (Tympanic)   Resp 20   Ht 6' (1 829 m)   Wt 125 kg (275 lb)   SpO2 94%   BMI 37 30 kg/m²      Blood pressure 144/65, pulse 95, temperature 98 1 °F (36 7 °C), temperature source Oral, resp  rate 18, height 6' (1 829 m), weight 127 kg (280 lb), SpO2 96 %  ,Body mass index is 37 97 kg/m²         Intake/Output Summary (Last 24 hours) at 10/02/17 0624  Last data filed at 10/02/17 0503    Gross per 24 hour   Intake          1123 75 ml   Output             2750 ml   Net         -1626 25 ml        NPO Sips with Meds    NGT - LCS    Continuous Infusions:   dextrose 75 mL/hr Last Rate: 75 mL/hr (10/02/17 0503)       Medications:   Scheduled Meds:   cefazolin 1,000 mg Intravenous Q8H   cyclobenzaprine 10 mg Oral HS   enoxaparin 40 mg Subcutaneous Daily   gabapentin 300 mg Oral HS   insulin glargine 40 Units Subcutaneous HS   insulin lispro 1-5 Units Subcutaneous TID AC   insulin lispro 2-12 Units Subcutaneous HS   lidocaine 1 patch Transdermal Daily   metoprolol 5 mg Intravenous Q6H   pantoprazole 40 mg Intravenous Q12H [address]   tiotropium 18 mcg Inhalation Daily       PRN Meds:     albuterol    Menthol 5 4 mg 1 lozenger q2hrs prn given x 1/ 24 hrs    IV morphine injection 2 mg q3hrs prn given x 2/ 24 hrs    ondansetron    phenol    LABS/Diagnostic Results:   CBC  Results from last 7 days  Lab Units 10/01/17  0505 09/30/17  0544   WBC Thousand/uL 11 92* 16 82*   HEMOGLOBIN g/dL 10 5* 11 0*   HEMATOCRIT % 35 8* 36 2*   PLATELETS Thousands/uL 190 216   NEUTROS PCT %  --  83*   LYMPHS PCT %  --  5*   MONOS PCT %  --  12   EOS PCT %  --  0      CMP  Results from last 7 days  Lab Units 10/01/17  0505 09/30/17  0544   SODIUM mmol/L 148* 143   POTASSIUM mmol/L 4 1 4 9   CHLORIDE mmol/L 111* 108   CO2 mmol/L 33* 31   BUN mg/dL 27* 33*   CREATININE mg/dL 1 28 1 29   CALCIUM mg/dL 8 8 8 7   TOTAL PROTEIN g/dL  --  7 5   BILIRUBIN TOTAL mg/dL  --  0 25   ALK PHOS U/L  --  67   ALT U/L  --  14   AST U/L  --  11   GLUCOSE RANDOM mg/dL 253* 260*      Lab Units 09/28/17  1207   INR   1 11     Age/Sex: 81 y o  male     Assessment/Plan:    Assessment:   Principal Problem:    Partial gastric outlet obstruction  Active Problems:    Coronary artery disease involving native coronary artery without angina pectoris    Gastroesophageal reflux disease without esophagitis    Obesity (BMI 30-39  9)    Dyslipidemia    Type 2 diabetes mellitus with hyperglycemia    Chronic respiratory failure with hypoxia    COPD (chronic obstructive pulmonary disease)    CKD (chronic kidney disease)    Duodenitis    Hypernatremia      Plan:   Partial gastric outlet obstruction  Possible duodenitis vs an ulcer vs underlying malignancy vs gastroparesis  Continue NGT  GI and surgery following  Plan for EGD 10/2   NPO  IV fluids     Acute encephalopathy:  Most likely due to due to delirium, no other signs of infection  Continue one-to-one until NGT removed        Possible Left lower extremity cellulitis:  Clinically improving  Ancef day 3  No signs of sepsis     MARANDA/CKD3:  Resolved with IV fluids  At baseline, Cr 1 3     HyperNa:  I switched IVF to D5W  Recheck BMP tomorrow     History of coronary artery disease status post stenting in 2015 with indeterminate troponin  Echo showed normal EF  C/w IV BB - switch to PO when NGT removed     Chronic diastolic heart failure:  Patient currently euvolemic and asymptomatic  Takes Lasix every other day at home, currently held  Continue IV fluids at 75 cc/hour  Monitor closely for volume overload  On BB     COPD:  Not in acute exacerbation  Continue Spiriva and p r n  Albuterol     FEN:  Pt NPO  Plan for NGT to be removed tomorrow and to be put on CLD  Nutrition consulted in case NGT cannot be pulled, in which case pt will likely need PICC and TPN        VTE Pharmacologic Prophylaxis:   Pharmacologic: Enoxaparin (Lovenox)  Mechanical VTE Prophylaxis in Place:  Yes      Current Patient Status: Inpatient   Certification Statement: The patient will continue to require additional inpatient hospital stay due to need for EGD       Surgery-General  Progress Notes Date of Service: 10/2/2017  6:24 AM      Assessment:  80 yo M with abdominal pain and nausea suggestive of possible gastric outlet obstruction; NGT w/ >2L output past 24 hrs     Plan:  NPO/NGT  Glucose management per endocrine and primary  EGD on today per GI  PRN Pain control  Will follow  Rest of care per primary team        Discharge Plan:   [facility]

## 2017-10-02 NOTE — PROGRESS NOTES
Progress Note - General Surgery   Edmond Jimenes Sr  81 y o  male MRN: [id number]  [facility] Encounter: [id number]    Assessment:  80 yo M with abdominal pain and nausea suggestive of possible gastric outlet obstruction; NGT w/ >2L output past 24 hrs    Plan:  NPO/NGT  Glucose management per endocrine and primary  EGD on today per GI  PRN Pain control  Will follow  Rest of care per primary team    Subjective/Objective     Subjective: No acute events overnight  Had BM yesterday  Objective:    Blood pressure 144/65, pulse 95, temperature 98 1 °F (36 7 °C), temperature source Oral, resp  rate 18, height 6' (1 829 m), weight 127 kg (280 lb), SpO2 96 %  ,Body mass index is 37 97 kg/m²  Intake/Output Summary (Last 24 hours) at 10/02/17 4925  Last data filed at 10/02/17 0503   Gross per 24 hour   Intake          1123 75 ml   Output             2750 ml   Net         -1626 25 ml       Invasive Devices     Peripheral Intravenous Line            Peripheral IV 09/28/17 Left Antecubital 3 days          Drain            NG/OG/Enteral Tube Nasogastric 18 Fr Left mouth 3 days                Physical Exam:   General: NAD  Lungs: nonlabored respirations  Heart: RRR  Abdomen: Abdomen slightly distended, minimally tender diffusely   NGT with brownish red drainage      Results from last 7 days  Lab Units 10/01/17  0505 09/30/17  0544 09/29/17  2108 09/29/17  0453   WBC Thousand/uL 11 92* 16 82*  --  13 13*   HEMOGLOBIN g/dL 10 5* 11 0* 11 1* 11 2*   HEMATOCRIT % 35 8* 36 2* 37 6 38 9   PLATELETS Thousands/uL 190 216  --  230       Results from last 7 days  Lab Units 10/01/17  0505 09/30/17  0544 09/29/17  0453   SODIUM mmol/L 148* 143 141   POTASSIUM mmol/L 4 1 4 9 5 1   CHLORIDE mmol/L 111* 108 106   CO2 mmol/L 33* 31 29   BUN mg/dL 27* 33* 51*   CREATININE mg/dL 1 28 1 29 1 49*   GLUCOSE RANDOM mg/dL 253* 260* 172*   CALCIUM mg/dL 8 8 8 7 9 1       Results from last 7 days  Lab Units 09/28/17  1207   INR  1 11 PTT seconds 30

## 2017-10-02 NOTE — CASE MANAGEMENT
[facility] in the [facility] by Onofre Carroll for 2017  Network Utilization Review Department  Phone: [phone]; Fax [phone]  ATTENTION: The Network Utilization Review Department is now centralized for our 7 Facilities  Make a note that we have a new phone and fax numbers for our Department  Please call with any questions or concerns to [phone] and carefully follow the prompts so that you are directed to the right person  All voicemails are confidential  Fax any determinations, approvals, denials, and requests for initial or continue stay review clinical to [phone]   Due to HIGH CALL volume, it would be easier if you could please send faxed requests to expedite your requests and in part, help us provide discharge notifications faster         Continued Stay Review    Date:  17 ACUTE MED SURG LEVEL OF CARE    Vital Signs: /63   Pulse 100   Temp 97 5 °F (36 4 °C) (Tympanic)   Resp 20   Ht 6' (1 829 m)   Wt 125 kg (275 lb)   SpO2 94%   BMI 37 30 kg/m²      Vitals:   Temp (24hrs), Av 2 °F (31 2 °C), Min:37 4 °F (3 °C), Max:99 °F (37 2 °C)   HR:  [] 84  Resp:  [16-20] 18  BP: (108-161)/(56-74) 156/74  SpO2:  [92 %-95 %] 92 %  Body mass index is 37 97 kg/m²       Input and Output Summary (last 24 hours):   Last data filed at 17 1100    Gross per 24 hour   Intake               50 ml   Output             1950 ml   Net            -1900 ml       NPO Sips with Meds    NGT - LCS    Continuous IV Infusions:   dextrose 75 mL/hr Last Rate: 75 mL/hr (10/02/17 0503)       Medications:   Scheduled Meds:   cefazolin 1,000 mg Intravenous Q8H   cyclobenzaprine 10 mg Oral HS   enoxaparin 40 mg Subcutaneous Daily   gabapentin 300 mg Oral HS   insulin glargine 40 Units Subcutaneous HS   insulin lispro 1-5 Units Subcutaneous TID AC   insulin lispro 2-12 Units Subcutaneous HS   lidocaine 1 patch Transdermal Daily   metoprolol 5 mg Intravenous Q6H   pantoprazole 40 mg Intravenous Q12H [facility]   tiotropium 18 mcg Inhalation Daily       PRN Meds:     albuterol    Menthol lozenger 5 4 mg 1 ozenger q2hrs prn given x 1/ 24 hrs    IV morphine injection 2 mg q3hrs prn given x 3/ 24 hrs    ondansetron    phenol    Abnormal Labs/Diagnostic Results:   CBC   Results from last 7 days  Lab Units 09/30/17  0544   WBC Thousand/uL 16 82*   HEMOGLOBIN g/dL 11 0*   HEMATOCRIT % 36 2*   PLATELETS Thousands/uL 216   NEUTROS PCT % 83*   LYMPHS PCT % 5*   MONOS PCT % 12   EOS PCT % 0      CMP  Results from last 7 days  Lab Units 09/30/17  0544   SODIUM mmol/L 143   POTASSIUM mmol/L 4 9   CHLORIDE mmol/L 108   CO2 mmol/L 31   BUN mg/dL 33*   CREATININE mg/dL 1 29   CALCIUM mg/dL 8 7   TOTAL PROTEIN g/dL 7 5   BILIRUBIN TOTAL mg/dL 0 25   ALK PHOS U/L 67   ALT U/L 14   AST U/L 11   GLUCOSE RANDOM mg/dL 260*      Lab Units 09/28/17  1207   INR   1 11         Age/Sex: 81 y o  male     Assessment/Plan:   Assessment:  Principal Problem:    Partial gastric outlet obstruction  Active Problems:    Coronary artery disease involving native coronary artery without angina pectoris    Gastroesophageal reflux disease without esophagitis    Obesity (BMI 30-39  9)    Dyslipidemia    Type 2 diabetes mellitus with hyperglycemia    Chronic respiratory failure with hypoxia    COPD (chronic obstructive pulmonary disease)    CKD (chronic kidney disease)    Duodenitis      Plan:     Partial gastric outlet obstruction  Exact reason not clear  Possible duodenitis vs an ulcer vs underlying malignancy vs decreased gastric motility due to underlying diabetes  Continue NGT  GI and surgery following  Plan for EGD on Monday  NPO  IV fluids     Acute encephalopathy:  Most likely due to due to delirium, no other signs of infection  Continue one-to-one for now  P r n   Ativan     Possible Left lower extremity cellulitis:  Started on Ancef  Erythema improved today  Has leukocytosis of 16 today and low-grade fever  Continue to monitor     Acute on chronic kidney disease:  Most likely pretty  Resolved with IV fluids  Creatinine 1 29 today from 1 4 yesterday  Continue IV fluids  Continue to monitor with daily BMP     History of coronary artery disease status post stenting in 2015 with mild troponin elevaBold  Patient currently chest pain-free  Patient evaluated by Cardiology  Elevated troponin as nonspecific  Will not trend for now      Chronic diastolic heart failure:  Patient currently euvolemic and asymptomatic  Takes Lasix every other day at home, currently held  Continue IV fluids at 75 cc/hour  Monitor closely for volume overload      COPD:  Not in acute exacerbation  Continue Spiriva and p r n  albuterol       VTE Pharmacologic Prophylaxis:   Pharmacologic: Enoxaparin (Lovenox)  Mechanical VTE Prophylaxis in Place:  Yes   Current Length of Stay: 2 day(s)     Current Patient Status: Inpatient   Certification Statement: The patient will continue to require additional inpatient hospital stay due to Partial gastric outlet obstruction       Discharge Plan:   [address] [facility] CLEARED    CASE MANAGEMENT FOLLOWING CLOSELY FOR ALL DISCHARGE NEEDS + PLANNING

## 2017-10-02 NOTE — PROGRESS NOTES
[facility] Progress Note  Patient: Jovanny Farmer  81 y o  male   MRN: [id number]  PCP: Nick Lala MD  Unit/Bed#: [facility] Encounter: [id number]  Date Of Visit: 10/02/17    Assessment:    Principal Problem:    Partial gastric outlet obstruction  Active Problems:    Coronary artery disease involving native coronary artery without angina pectoris    Gastroesophageal reflux disease without esophagitis    Obesity (BMI 30-39  9)    Dyslipidemia    Type 2 diabetes mellitus with hyperglycemia    Chronic respiratory failure with hypoxia    COPD (chronic obstructive pulmonary disease)    CKD (chronic kidney disease)    Duodenitis    Hypernatremia    Acute duodenal ulcer with bleeding    Acute blood loss anemia      Plan:    Partial gastric outlet obstruction likely 2/2 inflammation from duodenal ulcers  EGD done today  Will trial pt on CLD    Duodenal ulcers with bleeding/esophageal and gastric ulcers/Gastritis s/p EGD today with clips and epi applied to ulcers  Protonix GTT  Monitor Hgb    Acute blood loss anemia:  Baseline Hgb 13  Monitor Hgb      Acute encephalopathy:  Most likely due to due to delirium, no other signs of infection  Consider d/c of one-to-one      Possible Left lower extremity cellulitis:  Clinically improving  Ancef day 4  No signs of sepsis     MARANDA/CKD3:  Resolved with IV fluids  At baseline, Cr 1 3     HyperNa:  Na minimally improved, c/w D5W  Recheck BMP tomorrow     History of coronary artery disease status post stenting in 2015 with indeterminate troponin  Echo showed normal EF  Will switch BB to PO now that NGT removed     Chronic diastolic heart failure:  Patient currently euvolemic and asymptomatic  Takes Lasix every other day at home - consider giving tomorrow for edema  Pt has mild edema but no SOB  D5W will not cause acute exacerbation  On BB     COPD:  Not in acute exacerbation  Continue Spiriva and p r n   Albuterol     FEN:  Pt on CLD    VTE Pharmacologic Prophylaxis:   Pharmacologic: Enoxaparin (Lovenox)  Mechanical VTE Prophylaxis in Place: Yes    Patient Centered Rounds: I have performed bedside rounds with nursing staff today  Discussions with Specialists or Other Care Team Provider: d/w GI and surgery    Education and Discussions with Family / Patient: pt and wife    Time Spent for Care: 30 minutes  More than 50% of total time spent on counseling and coordination of care as described above  Current Length of Stay: 4 day(s)    Current Patient Status: Inpatient   Certification Statement: The patient will continue to require additional inpatient hospital stay due to need to monitor Hgb    Discharge Plan / Estimated Discharge Date: when cleared by GI and surg    Code Status: Level 1 - Full Code      Subjective:   Pt seen and examined  Pt was nauseous overnight  High NGT output  Objective:     Vitals:   Temp (24hrs), Av 3 °F (36 8 °C), Min:97 5 °F (36 4 °C), Max:98 9 °F (37 2 °C)    HR:  [] 103  Resp:  [13-20] 16  BP: (140-156)/(63-71) 145/71  SpO2:  [94 %-100 %] 100 %  Body mass index is 37 3 kg/m²  Input and Output Summary (last 24 hours): Intake/Output Summary (Last 24 hours) at 10/02/17 1442  Last data filed at 10/02/17 1419   Gross per 24 hour   Intake          1923 75 ml   Output             2970 ml   Net         -1046 25 ml       Physical Exam:     Physical Exam   Constitutional: He is oriented to person, place, and time  He appears well-developed and well-nourished  HENT:   Head: Normocephalic and atraumatic  NGT in place   Eyes: Conjunctivae and EOM are normal    Neck: Normal range of motion  Neck supple  Cardiovascular: Normal rate and regular rhythm  Pulmonary/Chest: Effort normal and breath sounds normal    Abdominal: Soft  Bowel sounds are normal  He exhibits no distension  There is no tenderness  Musculoskeletal: Normal range of motion  He exhibits edema (1 plus)     Neurological: He is alert and oriented to person, place, and time  Skin: Skin is warm and dry  Additional Data:     Labs:      Results from last 7 days  Lab Units 10/02/17  0521  09/30/17  0544   WBC Thousand/uL 11 43*  < > 16 82*   HEMOGLOBIN g/dL 9 8*  < > 11 0*   HEMATOCRIT % 34 0*  < > 36 2*   PLATELETS Thousands/uL 203  < > 216   NEUTROS PCT %  --   --  83*   LYMPHS PCT %  --   --  5*   MONOS PCT %  --   --  12   EOS PCT %  --   --  0   < > = values in this interval not displayed  Results from last 7 days  Lab Units 10/02/17  0521   SODIUM mmol/L 147*   POTASSIUM mmol/L 3 9   CHLORIDE mmol/L 107   CO2 mmol/L 34*   BUN mg/dL 30*   CREATININE mg/dL 1 24   CALCIUM mg/dL 8 6   TOTAL PROTEIN g/dL 6 8   BILIRUBIN TOTAL mg/dL 0 29   ALK PHOS U/L 76   ALT U/L 14   AST U/L 21   GLUCOSE RANDOM mg/dL 225*       Results from last 7 days  Lab Units 09/28/17  1207   INR  1 11       * I Have Reviewed All Lab Data Listed Above  * Additional Pertinent Lab Tests Reviewed: All [facility] Reviewed        Recent Cultures (last 7 days):           Last 24 Hours Medication List:     cefazolin 1,000 mg Intravenous Q8H   cyclobenzaprine 10 mg Oral HS   enoxaparin 40 mg Subcutaneous Daily   gabapentin 300 mg Oral HS   insulin glargine 40 Units Subcutaneous HS   insulin lispro 1-5 Units Subcutaneous TID AC   insulin lispro 2-12 Units Subcutaneous HS   lidocaine 1 patch Transdermal Daily   metoprolol 5 mg Intravenous Q6H   pantoprazole 40 mg Intravenous Q12H DRAKE   tiotropium 18 mcg Inhalation Daily        Today, Patient Was Seen By: Alfredo Scherer DO    ** Please Note: This note has been constructed using a voice recognition system   **

## 2017-10-03 LAB
ANION GAP SERPL CALCULATED.3IONS-SCNC: 5 MMOL/L (ref 4–13)
BUN SERPL-MCNC: 30 MG/DL (ref 5–25)
CALCIUM SERPL-MCNC: 8.6 MG/DL (ref 8.3–10.1)
CHLORIDE SERPL-SCNC: 99 MMOL/L (ref 100–108)
CO2 SERPL-SCNC: 34 MMOL/L (ref 21–32)
CREAT SERPL-MCNC: 1.47 MG/DL (ref 0.6–1.3)
GFR SERPL CREATININE-BSD FRML MDRD: 48 ML/MIN/1.73SQ M
GLUCOSE SERPL-MCNC: 120 MG/DL (ref 65–140)
GLUCOSE SERPL-MCNC: 125 MG/DL (ref 65–140)
GLUCOSE SERPL-MCNC: 168 MG/DL (ref 65–140)
GLUCOSE SERPL-MCNC: 171 MG/DL (ref 65–140)
GLUCOSE SERPL-MCNC: 199 MG/DL (ref 65–140)
GLUCOSE SERPL-MCNC: 233 MG/DL (ref 65–140)
GLUCOSE SERPL-MCNC: 314 MG/DL (ref 65–140)
HCT VFR BLD AUTO: 28.1 % (ref 36.5–49.3)
HCT VFR BLD AUTO: 29.5 % (ref 36.5–49.3)
HGB BLD-MCNC: 8.4 G/DL (ref 12–17)
HGB BLD-MCNC: 8.7 G/DL (ref 12–17)
MAGNESIUM SERPL-MCNC: 2 MG/DL (ref 1.6–2.6)
PHOSPHATE SERPL-MCNC: 3.3 MG/DL (ref 2.3–4.1)
POTASSIUM SERPL-SCNC: 3.9 MMOL/L (ref 3.5–5.3)
SODIUM SERPL-SCNC: 138 MMOL/L (ref 136–145)

## 2017-10-03 PROCEDURE — 85014 HEMATOCRIT: CPT | Performed by: SURGERY

## 2017-10-03 PROCEDURE — 84100 ASSAY OF PHOSPHORUS: CPT | Performed by: GENERAL PRACTICE

## 2017-10-03 PROCEDURE — 83735 ASSAY OF MAGNESIUM: CPT | Performed by: GENERAL PRACTICE

## 2017-10-03 PROCEDURE — C9113 INJ PANTOPRAZOLE SODIUM, VIA: HCPCS | Performed by: INTERNAL MEDICINE

## 2017-10-03 PROCEDURE — 80048 BASIC METABOLIC PNL TOTAL CA: CPT | Performed by: GENERAL PRACTICE

## 2017-10-03 PROCEDURE — 82948 REAGENT STRIP/BLOOD GLUCOSE: CPT

## 2017-10-03 PROCEDURE — 85018 HEMOGLOBIN: CPT | Performed by: SURGERY

## 2017-10-03 RX ORDER — ACETAMINOPHEN 325 MG/1
650 TABLET ORAL EVERY 6 HOURS PRN
Status: DISCONTINUED | OUTPATIENT
Start: 2017-10-03 | End: 2017-10-05 | Stop reason: HOSPADM

## 2017-10-03 RX ORDER — AMOXICILLIN 250 MG
1 CAPSULE ORAL
Status: DISCONTINUED | OUTPATIENT
Start: 2017-10-03 | End: 2017-10-05 | Stop reason: HOSPADM

## 2017-10-03 RX ORDER — OXYCODONE HYDROCHLORIDE 5 MG/1
5 TABLET ORAL EVERY 4 HOURS PRN
Status: DISCONTINUED | OUTPATIENT
Start: 2017-10-03 | End: 2017-10-05 | Stop reason: HOSPADM

## 2017-10-03 RX ORDER — OXYCODONE HYDROCHLORIDE 5 MG/1
2.5 TABLET ORAL EVERY 4 HOURS PRN
Status: DISCONTINUED | OUTPATIENT
Start: 2017-10-03 | End: 2017-10-05 | Stop reason: HOSPADM

## 2017-10-03 RX ORDER — BISACODYL 10 MG
10 SUPPOSITORY, RECTAL RECTAL ONCE
Status: DISCONTINUED | OUTPATIENT
Start: 2017-10-03 | End: 2017-10-04

## 2017-10-03 RX ORDER — DOCUSATE SODIUM 100 MG/1
100 CAPSULE, LIQUID FILLED ORAL 2 TIMES DAILY
Status: DISCONTINUED | OUTPATIENT
Start: 2017-10-03 | End: 2017-10-05 | Stop reason: HOSPADM

## 2017-10-03 RX ADMIN — INSULIN LISPRO 5 UNITS: 100 INJECTION, SOLUTION INTRAVENOUS; SUBCUTANEOUS at 17:14

## 2017-10-03 RX ADMIN — DEXTROSE 75 ML/HR: 5 SOLUTION INTRAVENOUS at 14:28

## 2017-10-03 RX ADMIN — ENOXAPARIN SODIUM 40 MG: 40 INJECTION SUBCUTANEOUS at 08:42

## 2017-10-03 RX ADMIN — CYCLOBENZAPRINE HYDROCHLORIDE 10 MG: 10 TABLET, FILM COATED ORAL at 21:54

## 2017-10-03 RX ADMIN — METOPROLOL TARTRATE 25 MG: 25 TABLET ORAL at 20:54

## 2017-10-03 RX ADMIN — INSULIN HUMAN 45 UNITS: 500 INJECTION, SOLUTION SUBCUTANEOUS at 17:13

## 2017-10-03 RX ADMIN — CEFAZOLIN SODIUM 1000 MG: 1 SOLUTION INTRAVENOUS at 01:04

## 2017-10-03 RX ADMIN — CEFAZOLIN SODIUM 1000 MG: 1 SOLUTION INTRAVENOUS at 08:42

## 2017-10-03 RX ADMIN — TIOTROPIUM BROMIDE 18 MCG: 18 CAPSULE ORAL; RESPIRATORY (INHALATION) at 21:54

## 2017-10-03 RX ADMIN — METOPROLOL TARTRATE 25 MG: 25 TABLET ORAL at 08:42

## 2017-10-03 RX ADMIN — SODIUM CHLORIDE 8 MG/HR: 9 INJECTION, SOLUTION INTRAVENOUS at 14:28

## 2017-10-03 RX ADMIN — DEXTROSE 75 ML/HR: 5 SOLUTION INTRAVENOUS at 00:21

## 2017-10-03 RX ADMIN — INSULIN HUMAN 55 UNITS: 500 INJECTION, SOLUTION SUBCUTANEOUS at 11:56

## 2017-10-03 RX ADMIN — BISACODYL 5 MG: 5 TABLET, COATED ORAL at 17:13

## 2017-10-03 RX ADMIN — GABAPENTIN 300 MG: 300 CAPSULE ORAL at 21:54

## 2017-10-03 RX ADMIN — LIDOCAINE 1 PATCH: 50 PATCH CUTANEOUS at 08:44

## 2017-10-03 RX ADMIN — MORPHINE SULFATE 2 MG: 2 INJECTION, SOLUTION INTRAMUSCULAR; INTRAVENOUS at 08:43

## 2017-10-03 RX ADMIN — Medication 1 TABLET: at 21:54

## 2017-10-03 RX ADMIN — OXYCODONE HYDROCHLORIDE 5 MG: 5 TABLET ORAL at 20:54

## 2017-10-03 RX ADMIN — DOCUSATE SODIUM 100 MG: 100 CAPSULE, LIQUID FILLED ORAL at 17:13

## 2017-10-03 RX ADMIN — INSULIN HUMAN 55 UNITS: 500 INJECTION, SOLUTION SUBCUTANEOUS at 08:44

## 2017-10-03 RX ADMIN — INSULIN LISPRO 5 UNITS: 100 INJECTION, SOLUTION INTRAVENOUS; SUBCUTANEOUS at 11:56

## 2017-10-03 RX ADMIN — CEFAZOLIN SODIUM 1000 MG: 1 SOLUTION INTRAVENOUS at 17:14

## 2017-10-03 NOTE — PROGRESS NOTES
[facility] Progress Note  Patient: Elif Love  81 y o  male   MRN: [id number]  PCP: Rafa Yoder MD  Unit/Bed#: [facility] 826-01 Encounter: [id number]  Date Of Visit: 10/03/17    Assessment/Plan:   Partial gastric outlet obstruction likely 2/2 inflammation from duodenal ulcers  Duodenal ulcers with bleeding/esophageal and gastric ulcers/Gastritis s/p EGD today with clips and epi applied to ulcers  - GI following  - c/w PPI drips for another 24 hrs  - d/c IVF  - monitor on GI non ulcerogenic diet  - surgery following as well and input appreciated     Acute blood loss anemia 2/2 # 1  - Hgb stable at mid 8  - d/c serial H&H  - cbc in am     Acute encephalopathy  - 2/2 # 1  - resolved     Suspected Left lower extremity cellulitis:  - c/w ancef #5/7  - monitor  - clear on exam today but patient evaluated at #5 of ABX     MARANDA on CKD3:  - Cr baseline 1 3-1 4  - today 1 48-----> possible related to constipation as he was on IVF up to now----> colace, senna, dulcolax PO and suppository given today  - bmp in am  - if Cr stable will resume lasix   - c/w holding ACE for now     Hypernatremia  - resolved  - d/c IVF     History of coronary artery disease status post stenting in 2015 with indeterminate troponin  - Echo with preserved EF  - c/w current medical care and monitor for symptoms     Chronic diastolic heart failure  - now that Na is WNL, will resume Q48H lasix as per home dose starting tomorrow     COPD with chronic hypoxic RF and no exacerbation  - on 3 LPM O2 at home  - c/w same    DM2  - c/w U500 as per endo     VTE Pharmacologic Prophylaxis: yes  Pharmacologic: Enoxaparin (Lovenox)  Mechanical VTE Prophylaxis in Place:  Yes     Patient Centered Rounds: I have performed bedside rounds with nursing staff today      Discussions with Specialists or Other Care Team Provider: GI and surgery     Education and Discussions with Family / Patient: patient and wife at bedside     Time Spent for Care: 27 minutes  More than 50% of total time spent on counseling and coordination of care as described above      Current Length of Stay: 5 day(s)     Current Patient Status: Inpatient   Certification Statement: The patient will continue to require additional inpatient hospital stay due to need to monitor with upgraded diet, pending PT and OT eval     Discharge Plan / Estimated Discharge Date: TBD     Code Status: Level 1 - Full Code    Subjective:   Patient states that he is feeling better today  No pain or nausea      Objective:     Vitals:   Temp (24hrs), Av 3 °F (36 8 °C), Min:97 9 °F (36 6 °C), Max:98 7 °F (37 1 °C)    HR:  [63-90] 87  Resp:  [18-20] 18  BP: (101-131)/(54-64) 101/54  SpO2:  [92 %-96 %] 93 %  Body mass index is 37 3 kg/m²  Input and Output Summary (last 24 hours): Intake/Output Summary (Last 24 hours) at 10/03/17 1531  Last data filed at 10/03/17 1342   Gross per 24 hour   Intake             2017 ml   Output              800 ml   Net             1217 ml       Physical Exam:     Physical Exam   Constitutional: He is oriented to person, place, and time  He appears well-developed  Cardiovascular: Normal rate, regular rhythm and normal heart sounds  Exam reveals no friction rub  No murmur heard  Pulmonary/Chest: Effort normal and breath sounds normal  No respiratory distress  He has no wheezes  He has no rales  Abdominal: Soft  Bowel sounds are normal  He exhibits no distension  There is no tenderness  There is no rebound  Musculoskeletal: He exhibits edema (trace to plus 1 B/L LE)  Neurological: He is alert and oriented to person, place, and time  He exhibits normal muscle tone  Skin: Skin is warm  Psychiatric: He has a normal mood and affect         Additional Data:     Labs:      Results from last 7 days  Lab Units 10/03/17  1226  10/02/17  0521  17  0544   WBC Thousand/uL  --   --  11 43*  < > 16 82*   HEMOGLOBIN g/dL 8 7*  < > 9 8*  < > 11 0*   HEMATOCRIT % 29 5*  < > 34 0*  < > 36 2*   PLATELETS Thousands/uL  --   --  203  < > 216   NEUTROS PCT %  --   --   --   --  83*   LYMPHS PCT %  --   --   --   --  5*   MONOS PCT %  --   --   --   --  12   EOS PCT %  --   --   --   --  0   < > = values in this interval not displayed  Results from last 7 days  Lab Units 10/03/17  0942 10/02/17  0521   SODIUM mmol/L 138 147*   POTASSIUM mmol/L 3 9 3 9   CHLORIDE mmol/L 99* 107   CO2 mmol/L 34* 34*   BUN mg/dL 30* 30*   CREATININE mg/dL 1 47* 1 24   CALCIUM mg/dL 8 6 8 6   TOTAL PROTEIN g/dL  --  6 8   BILIRUBIN TOTAL mg/dL  --  0 29   ALK PHOS U/L  --  76   ALT U/L  --  14   AST U/L  --  21   GLUCOSE RANDOM mg/dL 168* 225*       Results from last 7 days  Lab Units 09/28/17  1207   INR  1 11       * I Have Reviewed All Lab Data Listed Above  * Additional Pertinent Lab Tests Reviewed: All Labs Within Last 24 Hours Reviewed    Imaging:    Imaging Reports Reviewed Today Include: none  Imaging Personally Reviewed by Myself Includes:  none    Recent Cultures (last 7 days):           Last 24 Hours Medication List:     cefazolin 1,000 mg Intravenous Q8H   cyclobenzaprine 10 mg Oral HS   enoxaparin 40 mg Subcutaneous Daily   gabapentin 300 mg Oral HS   insulin lispro 4-20 Units Subcutaneous HS   insulin lispro 5-25 Units Subcutaneous TID AC   insulin regular 45 Units Subcutaneous Before Dinner   insulin regular 55 Units Subcutaneous Daily Before Breakfast   insulin regular 55 Units Subcutaneous Daily Before Lunch   lidocaine 1 patch Transdermal Daily   metoprolol tartrate 25 mg Oral Q12H DRAKE   tiotropium 18 mcg Inhalation Daily        Today, Patient Was Seen By: Obi Coughlin MD    ** Please Note: Dragon 360 Dictation voice to text software may have been used in the creation of this document   **

## 2017-10-03 NOTE — PROGRESS NOTES
Progress Note - Marielos Green Sr  81 y o  male MRN: [id number]    [facility] Encounter: [id number]      CC: diabetes f/u    Subjective:   Jeanine Sahu  is a 81y o  year old male with type 2 diabetes  Feels well  No complaints  No hypoglycemia  Blood sugar has improved  He is on a consistent carbohydrate diet  IV proton pump inhibitor is infusing  Objective:     Vitals: Blood pressure 118/59, pulse 63, temperature 97 9 °F (36 6 °C), temperature source Oral, resp  rate 20, height 6' (1 829 m), weight 125 kg (275 lb), SpO2 92 %  ,Body mass index is 37 3 kg/m²  Intake/Output Summary (Last 24 hours) at 10/03/17 1434  Last data filed at 10/03/17 1342   Gross per 24 hour   Intake             2017 ml   Output              800 ml   Net             1217 ml       Physical Exam:  General Appearance: awake, appears stated age and cooperative  Head: Normocephalic, without obvious abnormality, atraumatic  Extremities: moves all extremities  Skin: Skin color and temperature normal    Pulm: no labored breathing    Lab, Imaging and other studies: I have personally reviewed pertinent reports  POC Glucose (mg/dl)   Date Value   10/03/2017 199 (H)   10/03/2017 125   10/02/2017 386 (H)   10/02/2017 375 (H)   10/02/2017 341 (H)   10/02/2017 233 (H)   10/02/2017 314 (H)   10/02/2017 268 (H)   10/01/2017 300 (H)   10/01/2017 270 (H)       Assessment:  diabetes with hyperglycemia and bleeding duodenal ulcer    Plan:  1  Type 2 diabetes with hyperglycemia-the blood sugars have improved on the U500 regular insulin  Okay to discharge from endocrine perspective  2   Bleeding duodenal ulcer-management per Gastroenterology  Portions of the record may have been created with voice recognition software

## 2017-10-03 NOTE — PROGRESS NOTES
Progress Note - Surgery   Isabelle Goldmann Sr  81 y o  male MRN: [id number]  [facility] Encounter: [id number]    Assessment:  81 M with duodenal bleed, possible GOO    Plan:  ADAT  Contniue protonix  F/u biopsies  Pain control    Subjective/Objective     Subjective: No acute events overnight  Denies N/V  Passign gas, but no bowel movements  Tolrated clears  Objective:    Blood pressure 115/64, pulse 70, temperature 98 4 °F (36 9 °C), temperature source Oral, resp  rate 18, height 6' (1 829 m), weight 125 kg (275 lb), SpO2 96 %  ,Body mass index is 37 3 kg/m²        Intake/Output Summary (Last 24 hours) at 10/03/17 0556  Last data filed at 10/03/17 0300   Gross per 24 hour   Intake             2477 ml   Output             1520 ml   Net              957 ml       Invasive Devices     Peripheral Intravenous Line            Peripheral IV 10/02/17 Right Hand less than 1 day                Physical Exam:   General: NAD, AAOx3  CV: RRR +S1/S2  Chest: breath sounds bilaterally  Abdomen: soft, NT ND  Extremities: atraumatic, no edema        Results from last 7 days  Lab Units 10/03/17  0029 10/02/17  1900 10/02/17  0521 10/01/17  0505 09/30/17  0544   WBC Thousand/uL  --   --  11 43* 11 92* 16 82*   HEMOGLOBIN g/dL 8 4* 7 3* 9 8* 10 5* 11 0*   HEMATOCRIT % 28 1* 24 7* 34 0* 35 8* 36 2*   PLATELETS Thousands/uL  --   --  203 190 216       Results from last 7 days  Lab Units 10/02/17  0521 10/01/17  0505 09/30/17  0544   SODIUM mmol/L 147* 148* 143   POTASSIUM mmol/L 3 9 4 1 4 9   CHLORIDE mmol/L 107 111* 108   CO2 mmol/L 34* 33* 31   BUN mg/dL 30* 27* 33*   CREATININE mg/dL 1 24 1 28 1 29   GLUCOSE RANDOM mg/dL 225* 253* 260*   CALCIUM mg/dL 8 6 8 8 8 7       Results from last 7 days  Lab Units 09/28/17  1207   INR  1 11   PTT seconds 30

## 2017-10-03 NOTE — PROGRESS NOTES
Progress Note - Tiara Black Sr  81 y o  male MRN: [id number]    Unit/Bed#: [facility] 826-01 Encounter: [id number]    Subjective:     Patient seen and examined at bedside  He feels much better today  He denies abdominal pain, nausea and vomiting  He tolerated chicken fingers and mac and cheese for lunch  He is passing gas  Last bowel movement was two days ago  Objective:     Vitals: Blood pressure 118/59, pulse 63, temperature 97 9 °F (36 6 °C), temperature source Oral, resp  rate 20, height 6' (1 829 m), weight 125 kg (275 lb), SpO2 92 %  ,Body mass index is 37 3 kg/m²  Intake/Output Summary (Last 24 hours) at 10/03/17 1240  Last data filed at 10/03/17 0300   Gross per 24 hour   Intake             1677 ml   Output              800 ml   Net              877 ml       Physical Exam:     General Appearance: Alert, appears stated age and cooperative  Lungs: Distant breath sounds throughout bilaterally  Heart: Regular rate and rhythm, S1, S2 normal, no murmur, click, rub or gallop  Abdomen: Obese  Normal bowel sounds  Soft  Non-tender to palpation  Extremities: No cyanosis, edema    Invasive Devices     Peripheral Intravenous Line            Peripheral IV 10/02/17 Right Hand 1 day                Lab Results:    Results from last 7 days  Lab Units 10/03/17  0029  10/02/17  0521  09/30/17  0544   WBC Thousand/uL  --   --  11 43*  < > 16 82*   HEMOGLOBIN g/dL 8 4*  < > 9 8*  < > 11 0*   HEMATOCRIT % 28 1*  < > 34 0*  < > 36 2*   PLATELETS Thousands/uL  --   --  203  < > 216   NEUTROS PCT %  --   --   --   --  83*   LYMPHS PCT %  --   --   --   --  5*   MONOS PCT %  --   --   --   --  12   EOS PCT %  --   --   --   --  0   < > = values in this interval not displayed      Results from last 7 days  Lab Units 10/03/17  0942 10/02/17  0521   SODIUM mmol/L 138 147*   POTASSIUM mmol/L 3 9 3 9   CHLORIDE mmol/L 99* 107   CO2 mmol/L 34* 34*   BUN mg/dL 30* 30*   CREATININE mg/dL 1 47* 1 24   CALCIUM mg/dL 8 6 8 6   TOTAL PROTEIN g/dL  --  6 8   BILIRUBIN TOTAL mg/dL  --  0 29   ALK PHOS U/L  --  76   ALT U/L  --  14   AST U/L  --  21   GLUCOSE RANDOM mg/dL 168* 225*       Results from last 7 days  Lab Units 09/28/17  1207   INR  1 11       Results from last 7 days  Lab Units 09/28/17  1207   LIPASE u/L 89       Imaging Studies: I have personally reviewed pertinent imaging studies  Xr Chest Pa & Lateral    Result Date: 9/28/2017  Impression: Small right pleural effusion  Workstation performed: [id number]     Ct Abdomen Pelvis With Contrast    Result Date: 9/28/2017  Impression: The duodenum has an inflamed appearance suggesting acute duodenitis  The stomach is quite distended with gas and fluid  Could not entirely exclude the possibility of at least a partial gastric outlet obstruction  Correlate for p o  tolerance  There is also a duodenal diverticulum  Gallstones or sludge without evidence of acute cholecystitis  Small right pleural effusion  Stable minimal lung base nodularity  Slightly increased interstitial prominence of the right lung  Correlate for any pulmonary symptoms  ##imslh##imslh Workstation performed: [id number]       Assessment and Plan:    1) Bleeding duodenal ulcers: Diagnosed on EGD yesterday  Ulcers found throughout UGI tract including distal esophagus, stomach, and duodenum  An ulcer found in 2nd portion of duodenum had visible vessel and was oozing s/p epinephrine injection and clip placement  Hemoglobin stable at 8 4  No further bowel movements or bleeding reported  -Monitor H&H daily  -Continue PPI gtt for another 24 hours then may switch to PPI BID  -Follow-up biopsy results  -Continue regular diet as tolerated--we discussed avoiding tomato based products, citrus, caffeine, spicy foods  -Avoid NSAIDs    2) Acute abdominal pain/nausea: Resolved, likely secondary to #1  While CT scan showed gastric outlet obstruction, there was no evidence of obstruction on EGD      -Continue PPI and diet as tolerated

## 2017-10-04 ENCOUNTER — APPOINTMENT (INPATIENT)
Dept: RADIOLOGY | Facility: HOSPITAL | Age: 72
DRG: 380 | End: 2017-10-04
Attending: INTERNAL MEDICINE
Payer: COMMERCIAL

## 2017-10-04 LAB
ANION GAP SERPL CALCULATED.3IONS-SCNC: 8 MMOL/L (ref 4–13)
BACTERIA UR QL AUTO: NORMAL /HPF
BASOPHILS # BLD AUTO: 0.02 THOUSANDS/ΜL (ref 0–0.1)
BASOPHILS NFR BLD AUTO: 0 % (ref 0–1)
BILIRUB UR QL STRIP: NEGATIVE
BUN SERPL-MCNC: 34 MG/DL (ref 5–25)
CALCIUM SERPL-MCNC: 8 MG/DL (ref 8.3–10.1)
CHLORIDE SERPL-SCNC: 102 MMOL/L (ref 100–108)
CLARITY UR: CLEAR
CO2 SERPL-SCNC: 30 MMOL/L (ref 21–32)
COLOR UR: YELLOW
CREAT SERPL-MCNC: 1.67 MG/DL (ref 0.6–1.3)
CREAT UR-MCNC: 18.6 MG/DL
EOSINOPHIL # BLD AUTO: 0.24 THOUSAND/ΜL (ref 0–0.61)
EOSINOPHIL NFR BLD AUTO: 2 % (ref 0–6)
ERYTHROCYTE [DISTWIDTH] IN BLOOD BY AUTOMATED COUNT: 16.1 % (ref 11.6–15.1)
GFR SERPL CREATININE-BSD FRML MDRD: 41 ML/MIN/1.73SQ M
GLUCOSE SERPL-MCNC: 141 MG/DL (ref 65–140)
GLUCOSE SERPL-MCNC: 180 MG/DL (ref 65–140)
GLUCOSE SERPL-MCNC: 205 MG/DL (ref 65–140)
GLUCOSE SERPL-MCNC: 293 MG/DL (ref 65–140)
GLUCOSE SERPL-MCNC: 37 MG/DL (ref 65–140)
GLUCOSE SERPL-MCNC: 39 MG/DL (ref 65–140)
GLUCOSE SERPL-MCNC: 45 MG/DL (ref 65–140)
GLUCOSE SERPL-MCNC: 62 MG/DL (ref 65–140)
GLUCOSE UR STRIP-MCNC: NEGATIVE MG/DL
HCT VFR BLD AUTO: 32.5 % (ref 36.5–49.3)
HGB BLD-MCNC: 9.6 G/DL (ref 12–17)
HGB UR QL STRIP.AUTO: NEGATIVE
HYALINE CASTS #/AREA URNS LPF: NORMAL /LPF
KETONES UR STRIP-MCNC: NEGATIVE MG/DL
LEUKOCYTE ESTERASE UR QL STRIP: NEGATIVE
LYMPHOCYTES # BLD AUTO: 3.32 THOUSANDS/ΜL (ref 0.6–4.47)
LYMPHOCYTES NFR BLD AUTO: 24 % (ref 14–44)
MAGNESIUM SERPL-MCNC: 2.2 MG/DL (ref 1.6–2.6)
MCH RBC QN AUTO: 23.3 PG (ref 26.8–34.3)
MCHC RBC AUTO-ENTMCNC: 29.5 G/DL (ref 31.4–37.4)
MCV RBC AUTO: 79 FL (ref 82–98)
MICROALBUMIN UR-MCNC: <5 MG/L (ref 0–20)
MICROALBUMIN/CREAT 24H UR: <27 MG/G CREATININE (ref 0–30)
MONOCYTES # BLD AUTO: 0.77 THOUSAND/ΜL (ref 0.17–1.22)
MONOCYTES NFR BLD AUTO: 6 % (ref 4–12)
NEUTROPHILS # BLD AUTO: 9.69 THOUSANDS/ΜL (ref 1.85–7.62)
NEUTS SEG NFR BLD AUTO: 68 % (ref 43–75)
NITRITE UR QL STRIP: NEGATIVE
NON-SQ EPI CELLS URNS QL MICRO: NORMAL /HPF
NRBC BLD AUTO-RTO: 0 /100 WBCS
PH UR STRIP.AUTO: 5 [PH] (ref 4.5–8)
PHOSPHATE SERPL-MCNC: 3.8 MG/DL (ref 2.3–4.1)
PLATELET # BLD AUTO: 257 THOUSANDS/UL (ref 149–390)
PMV BLD AUTO: 9.7 FL (ref 8.9–12.7)
POTASSIUM SERPL-SCNC: 3.1 MMOL/L (ref 3.5–5.3)
PROT UR STRIP-MCNC: NEGATIVE MG/DL
RBC # BLD AUTO: 4.12 MILLION/UL (ref 3.88–5.62)
RBC #/AREA URNS AUTO: NORMAL /HPF
SODIUM SERPL-SCNC: 140 MMOL/L (ref 136–145)
SP GR UR STRIP.AUTO: 1.01 (ref 1–1.03)
UROBILINOGEN UR QL STRIP.AUTO: 0.2 E.U./DL
WBC # BLD AUTO: 14.11 THOUSAND/UL (ref 4.31–10.16)
WBC #/AREA URNS AUTO: NORMAL /HPF

## 2017-10-04 PROCEDURE — G8978 MOBILITY CURRENT STATUS: HCPCS

## 2017-10-04 PROCEDURE — 97163 PT EVAL HIGH COMPLEX 45 MIN: CPT

## 2017-10-04 PROCEDURE — 80048 BASIC METABOLIC PNL TOTAL CA: CPT | Performed by: INTERNAL MEDICINE

## 2017-10-04 PROCEDURE — 83735 ASSAY OF MAGNESIUM: CPT | Performed by: INTERNAL MEDICINE

## 2017-10-04 PROCEDURE — G8979 MOBILITY GOAL STATUS: HCPCS

## 2017-10-04 PROCEDURE — 82948 REAGENT STRIP/BLOOD GLUCOSE: CPT

## 2017-10-04 PROCEDURE — 82570 ASSAY OF URINE CREATININE: CPT | Performed by: INTERNAL MEDICINE

## 2017-10-04 PROCEDURE — 81001 URINALYSIS AUTO W/SCOPE: CPT | Performed by: INTERNAL MEDICINE

## 2017-10-04 PROCEDURE — C9113 INJ PANTOPRAZOLE SODIUM, VIA: HCPCS | Performed by: INTERNAL MEDICINE

## 2017-10-04 PROCEDURE — 82043 UR ALBUMIN QUANTITATIVE: CPT | Performed by: INTERNAL MEDICINE

## 2017-10-04 PROCEDURE — 85025 COMPLETE CBC W/AUTO DIFF WBC: CPT | Performed by: INTERNAL MEDICINE

## 2017-10-04 PROCEDURE — 84100 ASSAY OF PHOSPHORUS: CPT | Performed by: INTERNAL MEDICINE

## 2017-10-04 PROCEDURE — 71020 HB CHEST X-RAY 2VW FRONTAL&LATL: CPT

## 2017-10-04 RX ORDER — POTASSIUM CHLORIDE 20 MEQ/1
20 TABLET, EXTENDED RELEASE ORAL ONCE
Status: COMPLETED | OUTPATIENT
Start: 2017-10-04 | End: 2017-10-04

## 2017-10-04 RX ORDER — FUROSEMIDE 10 MG/ML
40 INJECTION INTRAMUSCULAR; INTRAVENOUS ONCE
Status: COMPLETED | OUTPATIENT
Start: 2017-10-04 | End: 2017-10-04

## 2017-10-04 RX ORDER — POTASSIUM CHLORIDE 20 MEQ/1
40 TABLET, EXTENDED RELEASE ORAL ONCE
Status: COMPLETED | OUTPATIENT
Start: 2017-10-04 | End: 2017-10-04

## 2017-10-04 RX ORDER — PANTOPRAZOLE SODIUM 40 MG/1
40 TABLET, DELAYED RELEASE ORAL
Status: DISCONTINUED | OUTPATIENT
Start: 2017-10-04 | End: 2017-10-05 | Stop reason: HOSPADM

## 2017-10-04 RX ADMIN — CYCLOBENZAPRINE HYDROCHLORIDE 10 MG: 10 TABLET, FILM COATED ORAL at 21:41

## 2017-10-04 RX ADMIN — GABAPENTIN 300 MG: 300 CAPSULE ORAL at 21:40

## 2017-10-04 RX ADMIN — INSULIN HUMAN 40 UNITS: 500 INJECTION, SOLUTION SUBCUTANEOUS at 08:58

## 2017-10-04 RX ADMIN — INSULIN HUMAN 55 UNITS: 500 INJECTION, SOLUTION SUBCUTANEOUS at 12:01

## 2017-10-04 RX ADMIN — CEFAZOLIN SODIUM 1000 MG: 1 SOLUTION INTRAVENOUS at 16:53

## 2017-10-04 RX ADMIN — LIDOCAINE 1 PATCH: 50 PATCH CUTANEOUS at 09:03

## 2017-10-04 RX ADMIN — OXYCODONE HYDROCHLORIDE 5 MG: 5 TABLET ORAL at 22:38

## 2017-10-04 RX ADMIN — PANTOPRAZOLE SODIUM 40 MG: 40 TABLET, DELAYED RELEASE ORAL at 15:52

## 2017-10-04 RX ADMIN — DOCUSATE SODIUM 100 MG: 100 CAPSULE, LIQUID FILLED ORAL at 09:01

## 2017-10-04 RX ADMIN — INSULIN HUMAN 35 UNITS: 500 INJECTION, SOLUTION SUBCUTANEOUS at 16:56

## 2017-10-04 RX ADMIN — CEFAZOLIN SODIUM 1000 MG: 1 SOLUTION INTRAVENOUS at 11:03

## 2017-10-04 RX ADMIN — METOPROLOL TARTRATE 25 MG: 25 TABLET ORAL at 09:01

## 2017-10-04 RX ADMIN — METOPROLOL TARTRATE 25 MG: 25 TABLET ORAL at 20:03

## 2017-10-04 RX ADMIN — POTASSIUM CHLORIDE 20 MEQ: 1500 TABLET, EXTENDED RELEASE ORAL at 18:27

## 2017-10-04 RX ADMIN — ENOXAPARIN SODIUM 40 MG: 40 INJECTION SUBCUTANEOUS at 09:02

## 2017-10-04 RX ADMIN — INSULIN LISPRO 4 UNITS: 100 INJECTION, SOLUTION INTRAVENOUS; SUBCUTANEOUS at 21:41

## 2017-10-04 RX ADMIN — DOCUSATE SODIUM 100 MG: 100 CAPSULE, LIQUID FILLED ORAL at 16:57

## 2017-10-04 RX ADMIN — SODIUM CHLORIDE 8 MG/HR: 9 INJECTION, SOLUTION INTRAVENOUS at 01:23

## 2017-10-04 RX ADMIN — CEFAZOLIN SODIUM 1000 MG: 1 SOLUTION INTRAVENOUS at 00:25

## 2017-10-04 RX ADMIN — INSULIN LISPRO 15 UNITS: 100 INJECTION, SOLUTION INTRAVENOUS; SUBCUTANEOUS at 11:54

## 2017-10-04 RX ADMIN — FUROSEMIDE 40 MG: 10 INJECTION, SOLUTION INTRAMUSCULAR; INTRAVENOUS at 11:07

## 2017-10-04 RX ADMIN — POTASSIUM CHLORIDE 40 MEQ: 1500 TABLET, EXTENDED RELEASE ORAL at 15:52

## 2017-10-04 NOTE — CONSULTS
Consultation - Nephrology   Arsh Pfeiffer Sr  81 y o  male MRN: [id number]  Unit/Bed#: [facility] 826-01 Encounter: [id number]    ASSESSMENT AND PLAN:  Nonoliguric MARANDA on CKD stage III with baseline serum creatinine 1 2  - creatinine on admission 1 5 improved to 1 2 at baseline and now slowly worsening to 1 6 today  - MARANDA could be secondary to worsening anemia (Hb dropped from 11's to 7 3 on 10/2) + ? septic ATN; other differential AIN  - check urinalysis and microscopy, check bladder scan for PVR to rule out any underlying retention  - CT scan of abdomen with IV contrast on admission showed no hydronephrosis  - accurate intake and output    Severe hypokalemia, serum potassium 3 1 could be secondary to GI loss  - serum magnesium 2 2  Will replace total 60 mEq potassium chloride once today  - status post Lasix IV once today  Continue to monitor serum potassium  Hypernatremia, resolved  Serum sodium 140  Mild volume overload, currently patient is on 3 L nasal cannula  - agree with chest x-ray, status post Lasix 40 mg IV once  Continue to monitor off IV fluid  - depend on renal function and volume status, will reassess tomorrow for need for diuretics  Severe anemia, initially hemoglobin 11 3 on admission significantly dropped to 7 3 and now improved to 9 6 today  - continue to closely monitor, transfuse p r n  for hemoglobin less than seven    Hypertension, blood pressure well controlled, avoid low BP  Discussed with Dr Andrews Coe  HISTORY OF PRESENT ILLNESS:  Requesting Physician: Bhavesh Baker MD  Reason for Consult: Madhuri Suarez  is a 81y o  year old male with long-term history of diabetes , hypertension , CAD, status post PTCA, history of prostate cancer , status post radiation/seed treatment , COPD, likely CKD stage IIIA who was admitted to [facility] after presenting with abdominal pain/abdominal distention    A renal consultation is requested today for assistance in the management of WILDER/CKd  Patient started having worsening abdominal pain/distention which brought him to the hospital for further evaluation  He denies having any history of kidney disease in the past   Old medical records were reviewed and his baseline creatinine seems to be around 1 2 with multiple episodes of Wilder in the past   He denies any urinary complaints including no dysuria, no hematuria, no urgency or hesitancy  Tolerating diet well now  Denies any nausea, vomiting  Denies any lightheadedness or dizziness  PAST MEDICAL HISTORY:  Past Medical History:   Diagnosis Date    Abdominal pain     Cardiac disease     COPD (chronic obstructive pulmonary disease) [place] [address] )     Coronary artery disease     Diabetes mellitus ([place] [address] )     Hyperlipidemia     Hypertension     MI (myocardial infarction)     with 3 stents    Prostate cancer ([place] [address] )        PAST SURGICAL HISTORY:  Past Surgical History:   Procedure Laterality Date    ABDOMINAL SURGERY      exploratory    ANGIOPLASTY      3 stents    ESOPHAGOGASTRODUODENOSCOPY N/A 10/2/2017    Procedure: ESOPHAGOGASTRODUODENOSCOPY (EGD); Surgeon: Kirsten Denton MD;  Location: [facility];   Service: Gastroenterology    PROSTATE SURGERY         ALLERGIES:  Allergies   Allergen Reactions    Metformin        SOCIAL HISTORY:  History   Alcohol Use No     History   Drug Use No     History   Smoking Status    Former Smoker    Packs/day: 1 00   Smokeless Tobacco    Never Used       FAMILY HISTORY:  Family History   Problem Relation Age of Onset    Heart disease Father        MEDICATIONS:    Current Facility-Administered Medications:     acetaminophen (TYLENOL) tablet 650 mg, 650 mg, Oral, Q6H PRN, Ascencion Adams MD    albuterol inhalation solution 2 5 mg, 2 5 mg, Nebulization, Q4H PRN, Cookie Arauz MD    ceFAZolin (ANCEF) IVPB (premix) 1,000 mg, 1,000 mg, Intravenous, Q8H, Eagle Ha PA-C, Stopped at 10/04/17 1133    cyclobenzaprine (FLEXERIL) tablet 10 mg, 10 mg, Oral, HS, Rhiannon Sher PA-C, 10 mg at 10/03/17 2154    docusate sodium (COLACE) capsule 100 mg, 100 mg, Oral, BID, Maria Isabel De Oliveira MD, 100 mg at 10/04/17 0901    enoxaparin (LOVENOX) subcutaneous injection 40 mg, 40 mg, Subcutaneous, Daily, Chastity Muñoz MD, 40 mg at 10/04/17 0902    gabapentin (NEURONTIN) capsule 300 mg, 300 mg, Oral, HS, Eagle Ha PA-C, 300 mg at 10/03/17 2154    insulin lispro (HumaLOG) 100 units/mL subcutaneous injection 4-20 Units, 4-20 Units, Subcutaneous, HS, Wan Bowie MD, 16 Units at 10/02/17 2116    insulin lispro (HumaLOG) 100 units/mL subcutaneous injection 5-25 Units, 5-25 Units, Subcutaneous, TID AC, 15 Units at 10/04/17 1154 **AND** Fingerstick Glucose (POCT), , , TID AC, Sean Santizo MD    insulin regular (HumuLIN R U-500) 500 units/mL CONCENTRATED injection 35 Units, 35 Units, Subcutaneous, Before Lexie Alexander MD    insulin regular (HumuLIN R U-500) 500 units/mL CONCENTRATED injection 55 Units, 55 Units, Subcutaneous, Daily Before Breakfast, Wan Bowie MD, 40 Units at 10/04/17 0858    insulin regular (HumuLIN R U-500) 500 units/mL CONCENTRATED injection 55 Units, 55 Units, Subcutaneous, Daily Before Lunch, Wan Bowie MD, 55 Units at 10/04/17 1201    lidocaine (LIDODERM) 5 % patch 1 patch, 1 patch, Transdermal, Daily, Eagle Ha PA-C, 1 patch at 10/04/17 0903    Menthol lozenge 5 4 mg, 1 lozenge, Mouth/Throat, Q2H PRN, Lyn Gallion, 5 4 mg at 10/01/17 1822    metoprolol tartrate (LOPRESSOR) tablet 25 mg, 25 mg, Oral, Q12H [address], Lidia Dues, DO, 25 mg at 10/04/17 0901    ondansetron (ZOFRAN) injection 4 mg, 4 mg, Intravenous, Q6H PRN, Chastity Muñoz MD    oxyCODONE (ROXICODONE) IR tablet 2 5 mg, 2 5 mg, Oral, Q4H PRN **OR** oxyCODONE (ROXICODONE) IR tablet 5 mg, 5 mg, Oral, Q4H PRN, Maria Isabel De Oliveira MD, 5 mg at 10/03/17 2054    pantoprazole (PROTONIX) EC tablet 40 mg, 40 mg, Oral, BID AC, Deepti Howe PA-C    phenol (CHLORASEPTIC) 1 4 % mucosal liquid 2 spray, 2 spray, Mouth/Throat, Q2H PRN, Manfred Mall, 2 spray at 09/29/17 2109    senna-docusate sodium (SENOKOT S) 8 6-50 mg per tablet 1 tablet, 1 tablet, Oral, HS, Illona Hashimoto, MD, 1 tablet at 10/03/17 2154    tiotropium (SPIRIVA) capsule for inhaler 18 mcg, 18 mcg, Inhalation, Daily, Lulu Johnson MD, 18 mcg at 10/03/17 2154    REVIEW OF SYSTEMS:  Complete 10 point review of systems were obtained and discussed in length with the patient  Complete review of systems were negative / unremarkable except mentioned in the HPI section       PHYSICAL EXAM:  Current Weight: Weight - Scale: 125 kg (275 lb 11 2 oz)  First Weight: Weight - Scale: 127 kg (280 lb)  Vitals:    10/04/17 0900   BP:    Pulse:    Resp:    Temp:    SpO2: 99%       Intake/Output Summary (Last 24 hours) at 10/04/17 1415  Last data filed at 10/04/17 1301   Gross per 24 hour   Intake          2701 08 ml   Output             2900 ml   Net          -198 92 ml       Physical Examination:  General:  Sitting in chair, in no acute distress  Eyes:  PERRLA, EOMI, mild conjunctival pallor present  ENT:  External examination of ears and nose unremarkable  Neck:  Supple  Respiratory:  Bilateral air entry present, decreased breath sounds, poor effort overall, occasional coarse breath sound bilaterally  CVS:  S1, S2 present  GI:  Soft, nontender, nondistended  CNS:  Active alert oriented x3  Extremities:  Trace to 1+ edema in both lower extremities  Psych:  Conscious, coherent, oriented  Skin:  Chronic skin changes in both lower extremities    Invasive Devices:      Lab Results:     Results from last 7 days  Lab Units 10/04/17  0503 10/03/17  1226 10/03/17  0942 10/03/17  0029 10/02/17  1900 10/02/17  0521 10/01/17  0505 09/30/17  0544  09/29/17  0453 09/28/17  1730 09/28/17  1207   WBC Thousand/uL 14 11*  --   --   --   --  11 43* 11 92* 16 82*  --  13 13*  --  15 16*   HEMOGLOBIN g/dL 9 6* 8 7*  --  8 4* 7 3* 9 8* 10 5* 11 0*  < > 11 2*  --  11 3*   HEMATOCRIT % 32 5* 29 5*  --  28 1* 24 7* 34 0* 35 8* 36 2*  < > 38 9  --  37 8   PLATELETS Thousands/uL 257  --   --   --   --  203 190 216  --  230 239 268   SODIUM mmol/L 140  --  138  --   --  147* 148* 143  --  141  --  138   POTASSIUM mmol/L 3 1*  --  3 9  --   --  3 9 4 1 4 9  --  5 1  --  5 3   CHLORIDE mmol/L 102  --  99*  --   --  107 111* 108  --  106  --  105   CO2 mmol/L 30  --  34*  --   --  34* 33* 31  --  29  --  26   BUN mg/dL 34*  --  30*  --   --  30* 27* 33*  --  51*  --  51*   CREATININE mg/dL 1 67*  --  1 47*  --   --  1 24 1 28 1 29  --  1 49*  --  1 58*   CALCIUM mg/dL 8 0*  --  8 6  --   --  8 6 8 8 8 7  --  9 1  --  9 2   MAGNESIUM mg/dL 2 2  --  2 0  --   --  2 4  --   --   --  2 8*  --   --    PHOSPHORUS mg/dL 3 8  --  3 3  --   --  2 6  --   --   --   --   --   --    ALBUMIN g/dL  --   --   --   --   --  2 4*  --  2 7*  --   --   --  2 8*   TOTAL PROTEIN g/dL  --   --   --   --   --  6 8  --  7 5  --   --   --  7 5   GLUCOSE RANDOM mg/dL 37*  --  168*  --   --  225* 253* 260*  --  172*  --  246*   < > = values in this interval not displayed  Other Studies:   Results for orders placed during the hospital encounter of 07/16/17   XR chest 1 view portable    Narrative CHEST     INDICATION:  Chest and back pain  Shortness of breath  COMPARISON:  November 27, 2016  VIEWS:   AP frontal    IMAGES:  2    FINDINGS:  Examination is somewhat limited due to rotation and because costophrenic angles are excluded from the inferior margins of this film  Cardiomediastinal silhouette appears unremarkable  The lungs are clear  No pneumothorax or pleural effusion  Visualized osseous structures appear within normal limits for the patient's age  Impression No active pulmonary disease on slightly limited examination        Workstation performed: [id number]       Results for orders placed during the hospital encounter of 09/28/17   XR chest pa & lateral    Narrative CHEST 2 View    INDICATION:  Shortness of breath and cough for 2 days  COMPARISON:  7/16/2017    VIEWS:  PA and lateral projections; 2 images    FINDINGS:         Cardiomediastinal silhouette appears unremarkable  The lungs are clear  No pneumothorax  Small right pleural effusion  Visualized osseous structures appear within normal limits for the patient's age  Impression Small right pleural effusion  Workstation performed: [id number]       Results for orders placed during the hospital encounter of 12/14/16   CT chest wo contrast    Narrative CT CHEST WITHOUT IV CONTRAST    INDICATION:  Abnormal chest x-ray    COMPARISON: Chest x-ray from 11/27/2016 and CT from 9/8/2015    TECHNIQUE: CT examination of the chest was performed without intravenous contrast   Axial, sagittal and coronal reformatted images were submitted for interpretation  Coronal thick section MIP (maximal intensity projection) images were also created  This examination, like all CT scans performed in the [facility], was performed utilizing techniques to minimize radiation dose exposure, including the use of iterative reconstruction and automated exposure control  FINDINGS:    LUNGS: There is no CT abnormality to correspond to the chest x-ray finding of increased left lower lobe density  This may have been related to overlying soft tissues  There is stable mild peribronchial thickening and bronchiectasis in the right middle   and lower lobes, likely postinflammatory  The 7 mm nodule in the right lower lobe seen previously has resolved  There is a focus of prominent, asymmetric peribronchial thickening in the right lower lobe measuring 4 mm best seen on series 601, image 131   which was not present previously  PLEURA:  Unremarkable  HEART/GREAT VESSELS:  Unremarkable for patient's age  MEDIASTINUM AND JEREMÍAS:  Unremarkable      CHEST WALL AND LOWER NECK: Unremarkable  VISUALIZED STRUCTURES IN THE UPPER ABDOMEN:  See concurrent CT of the abdomen and pelvis  OSSEOUS STRUCTURES:  There are healing right-sided rib fractures  Impression 1  No CT abnormality to correspond to the chest x-ray finding of left lower lobe density  This may have been related to overlying soft tissues  2   Resolution of previous 7 mm nodule in the right lower lobe  However, there is a new focus of asymmetric peribronchial thickening measuring 4 mm in the right lower lobe  This is likely inflammatory though six-month follow-up CT is recommended to   exclude developing neoplasm  3   Stable right middle and lower lobe bronchiectasis and peribronchial thickening, likely postinflammatory  ##sigslh##sigslh    ##[id number]##fuslh6      Workstation performed: [id number]       Results for orders placed during the hospital encounter of 12/14/16   CT abdomen pelvis w wo contrast    Narrative CT ABDOMEN AND PELVIS WITH AND WITHOUT IV CONTRAST    INDICATION:  Gross hematuria    COMPARISON:  9/10/2014    TECHNIQUE: CT of the kidneys was performed without intravenous contrast   Dynamic postcontrast CT evaluation of the abdomen and pelvis was performed in both nephrographic and delayed phases after the administration of intravenous contrast   This   examination, like all CT scans performed in the [facility], was performed utilizing techniques to minimize radiation dose exposure, including the use of iterative reconstruction and automated exposure control  Axial, sagittal and   coronal reformatted images were submitted for interpretation  IV Contrast:  iodixanol (VISIPAQUE) 320 MG/ML injection 100 mL  Note: (SINGLE DOSE/MULTI DOSE) information refers to the container from which the contrast was acquired  Contrast was injected one time intravenously without immediate complication      Enteric Contrast:  Enteric contrast was not administered  FINDINGS:    ABDOMEN    RIGHT KIDNEY AND URETER:  No solid renal mass  No detectable ureteral mass  No hydronephrosis or hydroureter  No urinary tract calculi  No perinephric collection  LEFT KIDNEY AND URETER:  No solid renal mass  No detectable ureteral mass  No hydronephrosis or hydroureter  No urinary tract calculi  No perinephric collection  URINARY BLADDER:  No bladder wall mass  No calculi  LUNG BASES:  See concurrent chest CT  LIVER/BILIARY TREE:  There is hepatic steatosis  GALLBLADDER:  No calcified gallstones  No pericholecystic inflammatory change  SPLEEN:  Unremarkable  PANCREAS:  Unremarkable  ADRENAL GLANDS:  There is stable hyperplasia of the left adrenal gland  STOMACH, BOWEL AND APPENDIX:  Unremarkable  ABDOMINOPELVIC CAVITY:  No ascites  No free intraperitoneal air  No lymphadenopathy  VESSELS:  Unremarkable for patient's age  PELVIS    REPRODUCTIVE ORGANS:  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  ABDOMINAL WALL/INGUINAL REGIONS:  There is a small amount of gas over the right anterior abdominal wall  OSSEOUS STRUCTURES:  No acute fracture or destructive osseous lesion  Impression 1  No CT abnormality identified to account for hematuria  2   Small amount of gas over the right anterior abdominal wall  Correlation for subcutaneous injection recommended  3   Hepatic steatosis  4   Stable left adrenal hyperplasia  Workstation performed: [id number]       CT scan with IV contrast on admission shows no hydronephrosis  Chest x-ray personally reviewed  which shows mild pleural effusion  Portions of the record may have been created with voice recognition software  Occasional wrong word or "sound a like" substitutions may have occurred due to the inherent limitations of voice recognition software  Read the chart carefully and recognize, using context, where substitutions have occurred

## 2017-10-04 NOTE — PROGRESS NOTES
Progress Note - Radha Lombardi Sr  81 y o  male MRN: [id number]    Unit/Bed#: [facility] 826-01 Encounter: [id number]      CC: diabetes f/u    Subjective:   Marce Calzada  is a 81y o  year old male with type 2 diabetes  Feels well  No complaints  Had an episode of severe hypoglycemia this morning  Objective:     Vitals: Blood pressure 110/62, pulse 78, temperature 97 8 °F (36 6 °C), temperature source Oral, resp  rate 20, height 6' (1 829 m), weight 125 kg (275 lb 11 2 oz), SpO2 99 %  ,Body mass index is 37 39 kg/m²  Intake/Output Summary (Last 24 hours) at 10/04/17 1359  Last data filed at 10/04/17 1301   Gross per 24 hour   Intake          2701 08 ml   Output             2900 ml   Net          -198 92 ml       Physical Exam:  General Appearance: awake, appears stated age and cooperative  Head: Normocephalic, without obvious abnormality, atraumatic  Extremities: moves all extremities  Skin: Skin color and temperature normal    Pulm: no labored breathing    Lab, Imaging and other studies: I have personally reviewed pertinent reports  POC Glucose (mg/dl)   Date Value   10/04/2017 293 (H)   10/04/2017 180 (H)   10/04/2017 62 (L)   10/04/2017 45 (L)   10/04/2017 39 (LL)   10/03/2017 120   10/03/2017 171 (H)   10/03/2017 199 (H)   10/03/2017 125   10/02/2017 386 (H)       Assessment:  diabetes with hyperglycemia, hypoglycemia and bleeding duodenal ulcer    Plan:  1  Type 2 diabetes with hypo and hyperglycemia-his IV dextrose was stopped which likely led to hypoglycemia  Continue to monitor blood sugar over time and make adjustments to the regimen if necessary  Decrease dinner insulin to 35 units  2   Bleeding duodenal ulcer-management per Gastroenterology  Portions of the record may have been created with voice recognition software

## 2017-10-04 NOTE — PHYSICAL THERAPY NOTE
Physical Therapy Evaluation    Patient's Name: Omari Ritter  Admitting Diagnosis  Nausea [R11 0]  Abdominal pain [R10 9]  Acute renal failure ([address] ) [N17 9]  Heme positive stool [R19 5]  Elevated troponin [R74 8]  COPD exacerbation (HCC) [J44 1]  Partial gastric outlet obstruction [K31 1]  Duodenitis [K29 80]  Coronary artery disease involving native coronary artery without angina pectoris [I25 10]  Type 2 diabetes mellitus with hyperglycemia, with long-term current use of insulin [E11 65, Z79 4]    Problem List  Patient Active Problem List   Diagnosis    Coronary artery disease involving native coronary artery without angina pectoris    Gastroesophageal reflux disease without esophagitis    Obesity (BMI 30-39  9)    Dyslipidemia    H/O prostate cancer    Back pain    Type 2 diabetes mellitus with hyperglycemia (HCC)    Venous stasis dermatitis of both lower extremities    Opioid dependence (HCC)    Chronic respiratory failure with hypoxia (HCC)    COPD (chronic obstructive pulmonary disease) (HCC)    Bilateral lower extremity edema    Hyponatremia    CKD (chronic kidney disease)    Partial gastric outlet obstruction    Duodenitis    Hypernatremia    Acute duodenal ulcer with bleeding    Acute blood loss anemia       Past Medical History  Past Medical History:   Diagnosis Date    Abdominal pain     Cardiac disease     COPD (chronic obstructive pulmonary disease) (HCC)     Coronary artery disease     Diabetes mellitus ([facility] [address] )     Hyperlipidemia     Hypertension     MI (myocardial infarction)     with 3 stents    Prostate cancer [facility])        Past Surgical History  Past Surgical History:   Procedure Laterality Date    ABDOMINAL SURGERY      exploratory    ANGIOPLASTY      3 stents    ESOPHAGOGASTRODUODENOSCOPY N/A 10/2/2017    Procedure: ESOPHAGOGASTRODUODENOSCOPY (EGD); Surgeon: Sheryle Congress, MD;  Location: [facility];   Service: Gastroenterology    PROSTATE SURGERY 10/04/17 1100   Note Type   Note type Eval only   Pain Assessment   Pain Assessment No/denies pain   Pain Score No Pain   Home Living   Type of Home House   Home Layout Two level   Bathroom Toilet Standard   Bathroom Equipment (denies)   Bathroom Accessibility (2ND FLOOR ONLY )   [address] [facility]; Wheelchair-manual;Electric scooter   Additional Comments PER PATIENT HE RESIDES WITH SPOUSE IN TWO STORY HOME  0 VINI  FULL FLIGHT TO 2ND FLOOR BATHROOM  Prior Function   Level of [place] Independent with ADLs and functional mobility   Lives With Spouse   ADL Assistance Independent   IADLs Independent   Falls in the last 6 months (1)   Vocational Retired   Comments [facility] [employer] PATIENT AT [facility] (NO AD INSIDE HOME, ELECTRIC [address]), ADLS, AND IADLS      Restrictions/Precautions   Weight Bearing Precautions Per Order No   Braces or Orthoses (NONE)   Other Precautions Fall Risk;O2  (3 L O2 )   General   Family/Caregiver Present No   Cognition   Overall Cognitive Status WFL   RUE Assessment   RUE Assessment WFL   LUE Assessment   LUE Assessment WFL   RLE Assessment   RLE Assessment WFL  (4-/5 (GROSSLY))   LLE Assessment   LLE Assessment WFL  (4-/5 (GROSSLY))   Bed Mobility   Supine to Sit Unable to assess   Sit to Supine Unable to assess   Additional Comments PATIENT SEATED EOB UPON PT ARRIVAL AND RETURNED TO SEATED EOB POST PT EVAL    Transfers   Sit to Stand 5  Supervision   Additional items Increased time required   Stand to Sit 5  Supervision   Ambulation/Elevation   Gait pattern Excessively slow;Decreased foot clearance   Gait Assistance 5  Supervision   Assistive Device None   Distance 10 FEET   Balance   Static Sitting Good   Static Standing Fair -   Ambulatory Fair -   Endurance Deficit   Endurance Deficit Yes   Endurance Deficit Description OBESITY; SOB   Activity Tolerance   Activity Tolerance Patient limited by fatigue   Nurse Made Aware ZULEMA TO SEE PER RN MARIE    Assessment Prognosis Good   Problem List Decreased strength;Decreased endurance; Impaired balance;Decreased mobility; Impaired judgement;Decreased safety awareness   Assessment PT COMPLETED EVALUATION OF 81YEAR OLD MALE ADMITTED WITH ABDOMINAL PAIN  DIAGNOSIS INCLUDES PARTIAL GASTRIC OUTLET SYNDROME LIKELY SECONDARY TO INFLAMMATION OF DUODENAL ULCER  PATIENT IS S/P EGD W/ ULCER CLIPPING 10/2/17  CURRENT MEDICAL AND PHYSICAL INSTABILITIES INCLUDE UTILIZATION OF SUPPLEMENTAL O2 (3 L ), FALLS RISK (H/O OF FALL IN HOSPITAL DURING PREVIOUS ADMISSION), AND A REGRESSION IN FUNCTIONAL STATUS FROM BASELINE  PMH IS SIGNIFICANT FOR MI, PROSTATE CA, COPD, CAD, HTN, AND OBESITY  PRIOR TO THIS ADMISSION PATIENT RESIDED WITH SPOUSE (HOME DURING THE DAY) IN MULTILEVEL HOME (0 VINI, 12 STEPS TO 2ND FLOOR BATHROOM) WHERE HE WAS PREVIOUSLY I WITH MOBILITY (NO AD IN HOME, ELECTRIC SCOOTER OUTSIDE HOME), ADLS, AND IADLS  CURRENT IMPAIRMENTS INCLUDE REDUCED B/L LE STRENGTH, BALANCE, AND ACTIVITY TOLERANCE IN ADDITION TO SOB  DURING PT EVALUATION PATIENT REQUIRED S FOR SIT-->STAND TRANSFER AND SHORT DISTANCE AMBULATION  PATIENT AMBULATED 5 FEET X 2 W/O AD (BED<-->BATHROOM) HOLDING ONTO FURNITURE  PATIENT DISAGREEABLE TO FURTHER AMBULATION REPORTING "I DONT FEEL LIKE IT"  UPON D/C HOME PATIENT WILL HAVE SUPPORTIVE SPOUSE  RECOMMEND USE OF RW, COMMODE FOR FIRST FLOOR, AND HOME PT SERVICES  PLAN FOR NEXT SESSION IS TO PROGRESS AMBULATORY DISTANCE W/ RW AND TRIAL STAIR NAVIGATION  HE WILL BENEFIT FROM CONTINUED SKILLED INPT PT SERVICES THIS ADMISSION IN ORDER TO ACHIEVE MAXIMAL FUNCTION AND SAFETY      Barriers to Discharge Inaccessible home environment   Barriers to Discharge Comments BATHROOM ONLY ON 2ND FLOOR    Goals   Patient Goals "LET ME PEE"   LTG Expiration Date 10/14/17   Long Term Goal #1 7-10 DAYS: 1) COMPLETE BED MOBILITY MOD-I; 2) PERFORM SIT<-->STAND TRANSFER MOD-I; 3) AMBULATE 200 FEET W/ LEAST RESTRICTIVE DEVICE; 4) NAVIGATE 12 STEPS S LEVEL; 5) IMPROVE B/L LE STRENGTH BY 1/2 GRADE   Treatment Day 0   Plan   Treatment/Interventions Functional transfer training;LE strengthening/ROM; Elevations; Therapeutic exercise;Gait training;Bed mobility   PT Frequency 5x/wk   Recommendation   Recommendation Home with family support;Home PT   Equipment Recommended Walker; Other (Comment)  (COMMODE)   PT - OK to Discharge Yes  (HOME W/ RW, FAMILY SUPPORT PRN, HOME PT )   Barthel Index   Feeding 10   Bathing 5   Grooming Score 5   Dressing Score 5   Bladder Score 10   Bowels Score 10   Toilet Use Score 5   Transfers (Bed/Chair) Score 10   Mobility (Level Surface) Score 0   Stairs Score 0   Barthel Index Score 60       Mellisa Hernández, PT

## 2017-10-04 NOTE — PLAN OF CARE
Problem: PHYSICAL THERAPY ADULT  Goal: Performs mobility at highest level of function for planned discharge setting  See evaluation for individualized goals  Treatment/Interventions: Functional transfer training, LE strengthening/ROM, Elevations, Therapeutic exercise, Gait training, Bed mobility  Equipment Recommended: (S) Walker, Other (Comment) (COMMODE)       See flowsheet documentation for full assessment, interventions and recommendations  Jena Perea, PT    Prognosis: Good  Problem List: Decreased strength, Decreased endurance, Impaired balance, Decreased mobility, Impaired judgement, Decreased safety awareness  Assessment: PT COMPLETED EVALUATION OF 81YEAR OLD MALE ADMITTED WITH ABDOMINAL PAIN  DIAGNOSIS INCLUDES PARTIAL GASTRIC OUTLET SYNDROME LIKELY SECONDARY TO INFLAMMATION OF DUODENAL ULCER  PATIENT IS S/P EGD W/ ULCER CLIPPING 10/2/17  CURRENT MEDICAL AND PHYSICAL INSTABILITIES INCLUDE UTILIZATION OF SUPPLEMENTAL O2 (3 L ), FALLS RISK (H/O OF FALL IN HOSPITAL DURING PREVIOUS ADMISSION), AND A REGRESSION IN FUNCTIONAL STATUS FROM BASELINE  PMH IS SIGNIFICANT FOR MI, PROSTATE CA, COPD, CAD, HTN, AND OBESITY  PRIOR TO THIS ADMISSION PATIENT RESIDED WITH SPOUSE (HOME DURING THE DAY) IN MULTILEVEL HOME (0 VINI, 12 STEPS TO 2ND FLOOR BATHROOM) WHERE HE WAS PREVIOUSLY I WITH MOBILITY (NO AD IN HOME, ELECTRIC SCOOTER OUTSIDE HOME), ADLS, AND IADLS  CURRENT IMPAIRMENTS INCLUDE REDUCED B/L LE STRENGTH, BALANCE, AND ACTIVITY TOLERANCE IN ADDITION TO SOB  DURING PT EVALUATION PATIENT REQUIRED S FOR SIT-->STAND TRANSFER AND SHORT DISTANCE AMBULATION  PATIENT AMBULATED 5 FEET X 2 W/O AD (BED<-->BATHROOM) HOLDING ONTO FURNITURE  PATIENT DISAGREEABLE TO FURTHER AMBULATION REPORTING "I DONT FEEL LIKE IT"  UPON D/C HOME PATIENT WILL HAVE SUPPORTIVE SPOUSE  RECOMMEND USE OF RW, COMMODE FOR FIRST FLOOR, AND HOME PT SERVICES  PLAN FOR NEXT SESSION IS TO PROGRESS AMBULATORY DISTANCE W/ RW AND TRIAL STAIR NAVIGATION  HE WILL BENEFIT FROM CONTINUED SKILLED INPT PT SERVICES THIS ADMISSION IN ORDER TO ACHIEVE MAXIMAL FUNCTION AND SAFETY  Barriers to Discharge: Inaccessible home environment  Barriers to Discharge Comments: BATHROOM ONLY ON 2ND FLOOR   Recommendation: (S) Home with family support, Home PT     PT - OK to Discharge: (S) Yes (HOME W/ RW, FAMILY SUPPORT PRN, HOME PT )    See flowsheet documentation for full assessment     Jam Posada, PT

## 2017-10-04 NOTE — CASE MANAGEMENT
[facility] in the [facility] [address]   Network Utilization Review Department  Phone: [phone]; Fax [phone]  ATTENTION: The Network Utilization Review Department is now centralized for our 7 Facilities  Make a note that we have a new phone and fax numbers for our Department  Please call with any questions or concerns to [phone] [facility] carefully follow the prompts so that you are directed to the right person  All voicemails are confidential  Fax any determinations, approvals, denials, and requests for initial or continue stay review clinical to [phone]   Due to HIGH CALL volume, it would be easier if you could please send faxed requests to expedite your requests and in part, help us provide discharge notifications faster         Continued Stay Review     Date:  10/4/17  Wednesday ACUTE MED SURG LEVEL OF CARE    Vital Signs: /62   Pulse 78   Temp 97 8 °F (36 6 °C) (Oral)   Resp 20   Ht 6' (1 829 m)   Wt 125 kg (275 lb 11 2 oz)   SpO2 99%   BMI 37 39 kg/m²     Vitals:   Temp (24hrs), Av 3 °F (36 8 °C), Min:97 9 °F (36 6 °C), Max:98 7 °F (37 1 °C)   HR:  [63-90] 87  Resp:  [18-20] 18  BP: (101-131)/(54-64) 101/54  SpO2:  [92 %-96 %] 93 %  Body mass index is 37 3 kg/m²         Input and Output Summary (last 24 hours):   Last data filed at 10/03/17 1342    Gross per 24 hour   Intake              ml   Output              800 ml   Net             1217 ml     Diet Regular; Regular House; Non Ulcerogenic       IV ACCESS    Medications:   Scheduled Meds:   cefazolin 1,000 mg Intravenous Q8H   cyclobenzaprine 10 mg Oral HS   docusate sodium 100 mg Oral BID   enoxaparin 40 mg Subcutaneous Daily   gabapentin 300 mg Oral HS   insulin lispro 4-20 Units Subcutaneous HS   insulin lispro 5-25 Units Subcutaneous TID AC   insulin regular 45 Units Subcutaneous Before Dinner   insulin regular 55 Units Subcutaneous Daily Before Breakfast insulin regular 55 Units Subcutaneous Daily Before Lunch   lidocaine 1 patch Transdermal Daily   metoprolol tartrate 25 mg Oral Q12H DRAKE   pantoprazole 40 mg Oral BID AC   senna-docusate sodium 1 tablet Oral HS   tiotropium 18 mcg Inhalation Daily     PRN Meds:     acetaminophen    albuterol    Menthol    ondansetron    oxyCODONE **OR** oxyCODONE 5 mg q4hrs prn given x 1/ 24 hrs    phenol    LABS/Diagnostic Results:         Age/Sex: 81 y o  male        Assessment/Plan:   Partial gastric outlet obstruction likely 2/2 inflammation from duodenal ulcers  Duodenal ulcers with bleeding/esophageal and gastric ulcers/Gastritis s/p EGD today with clips and epi applied to ulcers  - GI following  - c/w PPI drips for another 24 hrs  - d/c IVF  - monitor on GI non ulcerogenic diet  - surgery following as well and input appreciated     Acute blood loss anemia 2/2 # 1  - Hgb stable at mid 8  - d/c serial H&H  - cbc in am     Acute encephalopathy  - 2/2 # 1  - resolved     Suspected Left lower extremity cellulitis:  - c/w ancef #5/7  - monitor  - clear on exam today but patient evaluated at #5 of ABX     MARANDA on CKD3:  - Cr baseline 1 3-1 4  - today 1 48-----> possible related to constipation as he was on IVF up to now----> colace, senna, dulcolax PO and suppository given today  - bmp in am  - if Cr stable will resume lasix   - c/w holding ACE for now     Hypernatremia  - resolved  - d/c IVF     History of coronary artery disease status post stenting in 2015 with indeterminate troponin  - Echo with preserved EF  - c/w current medical care and monitor for symptoms     Chronic diastolic heart failure  - now that Na is WNL, will resume Q48H lasix as per home dose starting tomorrow     COPD with chronic hypoxic RF and no exacerbation  - on 3 LPM O2 at home  - c/w same     DM2  - c/w U500 as per endo     VTE Pharmacologic Prophylaxis: yes  Pharmacologic: Enoxaparin (Lovenox)  Mechanical VTE Prophylaxis in Place: Yes      Current Patient Status: Inpatient   Certification Statement: The patient will continue to require additional inpatient hospital stay due to need to monitor with upgraded diet     Discharge Plan:   [address] CLEARED    PER PT TODAY  Plan   Treatment/Interventions Functional transfer training;LE strengthening/ROM; Elevations; Therapeutic exercise;Gait training;Bed mobility   PT Frequency 5x/wk   Recommendation   Recommendation Home with family support;Home PT   Equipment Recommended Walker; Other (Comment)  (COMMODE)   PT - OK to Discharge Yes  (HOME W/ RW, FAMILY SUPPORT PRN, HOME PT )          CASE MANAGEMENT FOLLOWING CLOSELY FOR ALL DISCHARGE NEEDS

## 2017-10-04 NOTE — PROGRESS NOTES
Progress Note - Khai Rmoero Sr  81 y o  male MRN: [id number]    Unit/Bed#: [facility] 826-01 Encounter: [id number]    Subjective:     Patient seen and examined at bedside  He denies abdominal pain, nausea, vomiting  He is tolerating non-ulcerogenic diet  Objective:     Vitals: Blood pressure 110/62, pulse 78, temperature 97 8 °F (36 6 °C), temperature source Oral, resp  rate 20, height 6' (1 829 m), weight 125 kg (275 lb 11 2 oz), SpO2 99 %  ,Body mass index is 37 39 kg/m²  Intake/Output Summary (Last 24 hours) at 10/04/17 1213  Last data filed at 10/04/17 0901   Gross per 24 hour   Intake          2631 08 ml   Output             1250 ml   Net          1381 08 ml       Physical Exam:     General Appearance: Alert, obese male, appears stated age and cooperative  Lungs: Clear to auscultation bilaterally, no rales or rhonchi, no labored breathing/accessory muscle use  Heart: Regular rate and rhythm, S1, S2 normal, no murmur, click, rub or gallop  Abdomen: Obese  Normal bowel sounds  Soft  Non-tender to palpation    Extremities: Trace lower extremity edema bilaterally    Invasive Devices     Peripheral Intravenous Line            Peripheral IV 10/04/17 less than 1 day                Lab Results:    Results from last 7 days  Lab Units 10/04/17  0503   WBC Thousand/uL 14 11*   HEMOGLOBIN g/dL 9 6*   HEMATOCRIT % 32 5*   PLATELETS Thousands/uL 257   NEUTROS PCT % 68   LYMPHS PCT % 24   MONOS PCT % 6   EOS PCT % 2       Results from last 7 days  Lab Units 10/04/17  0503  10/02/17  0521   SODIUM mmol/L 140  < > 147*   POTASSIUM mmol/L 3 1*  < > 3 9   CHLORIDE mmol/L 102  < > 107   CO2 mmol/L 30  < > 34*   BUN mg/dL 34*  < > 30*   CREATININE mg/dL 1 67*  < > 1 24   CALCIUM mg/dL 8 0*  < > 8 6   TOTAL PROTEIN g/dL  --   --  6 8   BILIRUBIN TOTAL mg/dL  --   --  0 29   ALK PHOS U/L  --   --  76   ALT U/L  --   --  14   AST U/L  --   --  21   GLUCOSE RANDOM mg/dL 37*  < > 225*   < > = values in this interval not displayed  Results from last 7 days  Lab Units 09/28/17  1207   INR  1 11       Results from last 7 days  Lab Units 09/28/17  1207   LIPASE u/L 89       Imaging Studies: I have personally reviewed pertinent imaging studies  Xr Chest Pa & Lateral    Result Date: 9/28/2017  Impression: Small right pleural effusion  Workstation performed: [id number]     Ct Abdomen Pelvis With Contrast    Result Date: 9/28/2017  Impression: The duodenum has an inflamed appearance suggesting acute duodenitis  The stomach is quite distended with gas and fluid  Could not entirely exclude the possibility of at least a partial gastric outlet obstruction  Correlate for p o  tolerance  There is also a duodenal diverticulum  Gallstones or sludge without evidence of acute cholecystitis  Small right pleural effusion  Stable minimal lung base nodularity  Slightly increased interstitial prominence of the right lung  Correlate for any pulmonary symptoms  ##imslh##imslh Workstation performed: [id number]       Assessment and Plan:    Discussed with SLIM    1) Bleeding duodenal ulcers: Diagnosed on EGD 10/2  Ulcers found throughout UGI tract including distal esophagus, stomach, and duodenum  An ulcer found in 2nd portion of duodenum had visible vessel and was oozing s/p epinephrine injection and clip placement  Hemoglobin remains stable at 9 6       -Stop PPI gtt and start PPI BID x 2 weeks, then may decrease to daily dosing  -Follow-up biopsy results  -Continue non-ulcerogenic diet  -Avoid NSAIDs    GI will sign-off  Recommend outpatient GI follow-up 2 weeks post-discharge  Please call with questions

## 2017-10-04 NOTE — PROGRESS NOTES
[facility] Progress Note  Patient: Dre Cai  81 y o  male   MRN: [id number]  PCP: Vito Orantes MD  Unit/Bed#: [facility] 826-01 Encounter: [id number]  Date Of Visit: 10/04/17    Assessment/Plan:   MARANDA on CKD 3  - Cr baseline 1 3-1 4  - today 1 67  - unclear if related to anemia of blood loss or ATN for leg cellulitis?  - unlikely hypovolemic as IVF did not improved Cr  - nephrology involved and agreed with trail of 40 mg IV lasix once as he does appear slighly OD on exam  - c/w holding ACE  - avoid nephrotoxins  - GFR dosed meds  - no NSAIDs   - BMP in am      Leukocytosis  - likely reactive   - UA w/o evidence of UTI  - on ABX for cellulitis  - CXR pending  - patient w/o clear infectious symptoms  - cbc in am    Partial gastric outlet obstruction likely 2/2 inflammation from duodenal ulcers  Duodenal ulcers with bleeding/esophageal and gastric ulcers/Gastritis s/p EGD today with clips and epi applied to ulcers  - GI signed off----> FU as OP  - gastrin sent and pending  - PPI switched to PO BID, he should be on same dose for 14 days then change maybe to daily if [id number] Jagruti Liriano with Gi in FU  - will d/c non ulcerogenic diet  - no NSAIDs at d/c  - surgery followed and no surgery indicated     Acute blood loss anemia 2/2 # 1  - Hgb stable at mid 8, today 9 6?-----> cbc in am tomorrow     Acute encephalopathy  - 2/2 # 1  - resolved     Suspected Left lower extremity cellulitis:  - c/w ancef #6/7  - monitor     Hypernatremia  - resolved     History of coronary artery disease status post stenting in 2015 with indeterminate troponin  - Echo with preserved EF  - c/w current medical care and monitor for symptoms     Chronic diastolic heart failure  - lasix as above  - edema 2/2 iatrogenic fluid OD and not acute CHF exacerbation     COPD with chronic hypoxic RF and no exacerbation  - on 3 LPM O2 at home  - c/w same     DM2  - c/w U500 as per endo  - hypoGLC noted and likely related to the fact that D5W was d/c  - appreciated insulin dose adjustment by endo     VTE Pharmacologic Prophylaxis: yes  Pharmacologic: Enoxaparin (Lovenox)  Mechanical VTE Prophylaxis in Place: Yes     Patient Centered Rounds: I have performed bedside rounds with nursing staff today      Discussions with Specialists or Other Care Team Provider: GI, nephrology, endo     Education and Discussions with Family / Cy Hutchisono, declined call to wife     Time Spent for Care: 30 minutes   More than 50% of total time spent on counseling and coordination of care as described above      Current Length of Stay: 6 day(s)     Current Patient Status: Inpatient   Certification Statement: The patient will continue to require additional inpatient hospital stay due to need to monitor Cr     Discharge Plan / Estimated Discharge Date: possibly home in am with VNA and home PT     Code Status: Level 1 - Full Code    Subjective:   Patient feels better today  No complains    Objective:     Vitals:   Temp (24hrs), Av 1 °F (36 7 °C), Min:97 8 °F (36 6 °C), Max:98 3 °F (36 8 °C)    HR:  [78-85] 78  Resp:  [20] 20  BP: (110-112)/(56-62) 110/62  SpO2:  [94 %-99 %] 99 %  Body mass index is 37 39 kg/m²  Input and Output Summary (last 24 hours): Intake/Output Summary (Last 24 hours) at 10/04/17 1550  Last data filed at 10/04/17 1301   Gross per 24 hour   Intake          2701 08 ml   Output             2900 ml   Net          -198 92 ml       Physical Exam:     Physical Exam   Constitutional: He is oriented to person, place, and time  He appears well-developed  Cardiovascular: Normal rate, regular rhythm and normal heart sounds  Exam reveals no friction rub  No murmur heard  Pulmonary/Chest: Effort normal and breath sounds normal  No respiratory distress  He has no wheezes  He has no rales  Abdominal: Soft  Bowel sounds are normal  He exhibits no distension  There is no tenderness  There is no rebound  Musculoskeletal: He exhibits edema (plus 1-2 B/L LE)  Neurological: He is alert and oriented to person, place, and time  He exhibits normal muscle tone  Skin: Skin is warm  Psychiatric: He has a normal mood and affect  Additional Data:     Labs:      Results from last 7 days  Lab Units 10/04/17  0503   WBC Thousand/uL 14 11*   HEMOGLOBIN g/dL 9 6*   HEMATOCRIT % 32 5*   PLATELETS Thousands/uL 257   NEUTROS PCT % 68   LYMPHS PCT % 24   MONOS PCT % 6   EOS PCT % 2       Results from last 7 days  Lab Units 10/04/17  0503  10/02/17  0521   SODIUM mmol/L 140  < > 147*   POTASSIUM mmol/L 3 1*  < > 3 9   CHLORIDE mmol/L 102  < > 107   CO2 mmol/L 30  < > 34*   BUN mg/dL 34*  < > 30*   CREATININE mg/dL 1 67*  < > 1 24   CALCIUM mg/dL 8 0*  < > 8 6   TOTAL PROTEIN g/dL  --   --  6 8   BILIRUBIN TOTAL mg/dL  --   --  0 29   ALK PHOS U/L  --   --  76   ALT U/L  --   --  14   AST U/L  --   --  21   GLUCOSE RANDOM mg/dL 37*  < > 225*   < > = values in this interval not displayed  Results from last 7 days  Lab Units 09/28/17  1207   INR  1 11       * I Have Reviewed All Lab Data Listed Above  * Additional Pertinent Lab Tests Reviewed:  All Labs Within Last 24 Hours Reviewed    Imaging:    Imaging Reports Reviewed Today Include: none  Imaging Personally Reviewed by Myself Includes: none    Recent Cultures (last 7 days):           Last 24 Hours Medication List:     cefazolin 1,000 mg Intravenous Q8H   cyclobenzaprine 10 mg Oral HS   docusate sodium 100 mg Oral BID   enoxaparin 40 mg Subcutaneous Daily   gabapentin 300 mg Oral HS   insulin lispro 4-20 Units Subcutaneous HS   insulin lispro 5-25 Units Subcutaneous TID AC   insulin regular 35 Units Subcutaneous Before Dinner   insulin regular 55 Units Subcutaneous Daily Before Breakfast   insulin regular 55 Units Subcutaneous Daily Before Lunch   lidocaine 1 patch Transdermal Daily   metoprolol tartrate 25 mg Oral Q12H DRAKE   pantoprazole 40 mg Oral BID AC   potassium chloride 20 mEq Oral Once   potassium chloride 40 mEq Oral Once   senna-docusate sodium 1 tablet Oral HS   tiotropium 18 mcg Inhalation Daily        Today, Patient Was Seen By: Ascencion Adams MD    ** Please Note: Dragon 360 Dictation voice to text software may have been used in the creation of this document   **

## 2017-10-05 VITALS
WEIGHT: 275.7 LBS | TEMPERATURE: 97.5 F | DIASTOLIC BLOOD PRESSURE: 57 MMHG | HEIGHT: 72 IN | HEART RATE: 71 BPM | BODY MASS INDEX: 37.34 KG/M2 | RESPIRATION RATE: 18 BRPM | OXYGEN SATURATION: 96 % | SYSTOLIC BLOOD PRESSURE: 117 MMHG

## 2017-10-05 PROBLEM — D62 ACUTE BLOOD LOSS ANEMIA: Status: RESOLVED | Noted: 2017-10-02 | Resolved: 2017-10-05

## 2017-10-05 PROBLEM — K31.1 PARTIAL GASTRIC OUTLET OBSTRUCTION: Status: RESOLVED | Noted: 2017-09-28 | Resolved: 2017-10-05

## 2017-10-05 PROBLEM — E87.0 HYPERNATREMIA: Status: RESOLVED | Noted: 2017-10-01 | Resolved: 2017-10-05

## 2017-10-05 PROBLEM — K26.0 ACUTE DUODENAL ULCER WITH BLEEDING: Status: RESOLVED | Noted: 2017-10-02 | Resolved: 2017-10-05

## 2017-10-05 LAB
ANION GAP SERPL CALCULATED.3IONS-SCNC: 6 MMOL/L (ref 4–13)
BASOPHILS # BLD AUTO: 0.01 THOUSANDS/ΜL (ref 0–0.1)
BASOPHILS NFR BLD AUTO: 0 % (ref 0–1)
BUN SERPL-MCNC: 28 MG/DL (ref 5–25)
CALCIUM SERPL-MCNC: 7.9 MG/DL (ref 8.3–10.1)
CHLORIDE SERPL-SCNC: 102 MMOL/L (ref 100–108)
CO2 SERPL-SCNC: 34 MMOL/L (ref 21–32)
CREAT SERPL-MCNC: 1.48 MG/DL (ref 0.6–1.3)
EOSINOPHIL # BLD AUTO: 0.18 THOUSAND/ΜL (ref 0–0.61)
EOSINOPHIL NFR BLD AUTO: 2 % (ref 0–6)
ERYTHROCYTE [DISTWIDTH] IN BLOOD BY AUTOMATED COUNT: 16.1 % (ref 11.6–15.1)
GFR SERPL CREATININE-BSD FRML MDRD: 47 ML/MIN/1.73SQ M
GLUCOSE SERPL-MCNC: 133 MG/DL (ref 65–140)
GLUCOSE SERPL-MCNC: 146 MG/DL (ref 65–140)
GLUCOSE SERPL-MCNC: 205 MG/DL (ref 65–140)
GLUCOSE SERPL-MCNC: 231 MG/DL (ref 65–140)
HCT VFR BLD AUTO: 30 % (ref 36.5–49.3)
HGB BLD-MCNC: 9 G/DL (ref 12–17)
LYMPHOCYTES # BLD AUTO: 1.62 THOUSANDS/ΜL (ref 0.6–4.47)
LYMPHOCYTES NFR BLD AUTO: 17 % (ref 14–44)
MAGNESIUM SERPL-MCNC: 2.4 MG/DL (ref 1.6–2.6)
MCH RBC QN AUTO: 23.8 PG (ref 26.8–34.3)
MCHC RBC AUTO-ENTMCNC: 30 G/DL (ref 31.4–37.4)
MCV RBC AUTO: 79 FL (ref 82–98)
MONOCYTES # BLD AUTO: 0.76 THOUSAND/ΜL (ref 0.17–1.22)
MONOCYTES NFR BLD AUTO: 8 % (ref 4–12)
NEUTROPHILS # BLD AUTO: 7.22 THOUSANDS/ΜL (ref 1.85–7.62)
NEUTS SEG NFR BLD AUTO: 73 % (ref 43–75)
NRBC BLD AUTO-RTO: 0 /100 WBCS
PHOSPHATE SERPL-MCNC: 3.6 MG/DL (ref 2.3–4.1)
PLATELET # BLD AUTO: 182 THOUSANDS/UL (ref 149–390)
PMV BLD AUTO: 9.6 FL (ref 8.9–12.7)
POTASSIUM SERPL-SCNC: 4.1 MMOL/L (ref 3.5–5.3)
RBC # BLD AUTO: 3.78 MILLION/UL (ref 3.88–5.62)
SODIUM SERPL-SCNC: 142 MMOL/L (ref 136–145)
WBC # BLD AUTO: 9.84 THOUSAND/UL (ref 4.31–10.16)

## 2017-10-05 PROCEDURE — 80048 BASIC METABOLIC PNL TOTAL CA: CPT | Performed by: INTERNAL MEDICINE

## 2017-10-05 PROCEDURE — 84100 ASSAY OF PHOSPHORUS: CPT | Performed by: INTERNAL MEDICINE

## 2017-10-05 PROCEDURE — 82941 ASSAY OF GASTRIN: CPT | Performed by: INTERNAL MEDICINE

## 2017-10-05 PROCEDURE — 82948 REAGENT STRIP/BLOOD GLUCOSE: CPT

## 2017-10-05 PROCEDURE — 83735 ASSAY OF MAGNESIUM: CPT | Performed by: INTERNAL MEDICINE

## 2017-10-05 PROCEDURE — 85025 COMPLETE CBC W/AUTO DIFF WBC: CPT | Performed by: INTERNAL MEDICINE

## 2017-10-05 RX ORDER — POTASSIUM CHLORIDE 750 MG/1
10 TABLET, EXTENDED RELEASE ORAL DAILY
Status: DISCONTINUED | OUTPATIENT
Start: 2017-10-05 | End: 2017-10-05 | Stop reason: HOSPADM

## 2017-10-05 RX ORDER — CYCLOBENZAPRINE HCL 10 MG
10 TABLET ORAL
Qty: 30 TABLET | Refills: 0 | Status: SHIPPED | OUTPATIENT
Start: 2017-10-05 | End: 2018-05-23

## 2017-10-05 RX ORDER — FUROSEMIDE 40 MG/1
40 TABLET ORAL DAILY
Qty: 30 TABLET | Refills: 0 | Status: ON HOLD | OUTPATIENT
Start: 2017-10-05 | End: 2018-05-26

## 2017-10-05 RX ORDER — DOCUSATE SODIUM 100 MG/1
100 CAPSULE, LIQUID FILLED ORAL 2 TIMES DAILY
Qty: 10 CAPSULE | Refills: 0 | Status: SHIPPED | OUTPATIENT
Start: 2017-10-05 | End: 2017-10-31

## 2017-10-05 RX ORDER — LIDOCAINE 50 MG/G
1 PATCH TOPICAL DAILY
Qty: 30 PATCH | Refills: 0 | Status: SHIPPED | OUTPATIENT
Start: 2017-10-05 | End: 2017-10-31

## 2017-10-05 RX ORDER — AMOXICILLIN 250 MG
1 CAPSULE ORAL
Qty: 60 TABLET | Refills: 0 | Status: SHIPPED | OUTPATIENT
Start: 2017-10-05 | End: 2017-10-31

## 2017-10-05 RX ORDER — PANTOPRAZOLE SODIUM 40 MG/1
40 TABLET, DELAYED RELEASE ORAL
Qty: 60 TABLET | Refills: 0 | Status: SHIPPED | OUTPATIENT
Start: 2017-10-05 | End: 2020-04-17

## 2017-10-05 RX ORDER — POTASSIUM CHLORIDE 750 MG/1
10 TABLET, EXTENDED RELEASE ORAL DAILY
Qty: 30 TABLET | Refills: 0 | Status: SHIPPED | OUTPATIENT
Start: 2017-10-05 | End: 2018-02-12 | Stop reason: HOSPADM

## 2017-10-05 RX ADMIN — INSULIN LISPRO 10 UNITS: 100 INJECTION, SOLUTION INTRAVENOUS; SUBCUTANEOUS at 07:25

## 2017-10-05 RX ADMIN — INSULIN HUMAN 55 UNITS: 500 INJECTION, SOLUTION SUBCUTANEOUS at 07:26

## 2017-10-05 RX ADMIN — CEFAZOLIN SODIUM 1000 MG: 1 SOLUTION INTRAVENOUS at 08:59

## 2017-10-05 RX ADMIN — ENOXAPARIN SODIUM 40 MG: 40 INJECTION SUBCUTANEOUS at 09:03

## 2017-10-05 RX ADMIN — PANTOPRAZOLE SODIUM 40 MG: 40 TABLET, DELAYED RELEASE ORAL at 07:03

## 2017-10-05 RX ADMIN — INSULIN HUMAN 55 UNITS: 500 INJECTION, SOLUTION SUBCUTANEOUS at 12:14

## 2017-10-05 RX ADMIN — CEFAZOLIN SODIUM 1000 MG: 1 SOLUTION INTRAVENOUS at 00:30

## 2017-10-05 RX ADMIN — METOPROLOL TARTRATE 25 MG: 25 TABLET ORAL at 08:59

## 2017-10-05 RX ADMIN — POTASSIUM CHLORIDE 10 MEQ: 750 TABLET, EXTENDED RELEASE ORAL at 12:16

## 2017-10-05 RX ADMIN — INSULIN LISPRO 10 UNITS: 100 INJECTION, SOLUTION INTRAVENOUS; SUBCUTANEOUS at 12:13

## 2017-10-05 NOTE — DISCHARGE SUMMARY
Discharge Summary - [facility]    Patient Information: Honorio Henry  81 y o  male MRN: [id number]  [facility] Encounter: [id number]    Discharging Physician / Practitioner: Lynda Babin MD  PCP: Leatha Kirk MD  Admission Date: 9/28/2017  Discharge Date: 10/05/17    Reason for Admission: see H&P    Discharge Diagnoses:     Principal Problem:    Duodenitis  Active Problems:    Type 2 diabetes mellitus with hyperglycemia (HCC)    Chronic respiratory failure with hypoxia (HCC)    COPD (chronic obstructive pulmonary disease) ([facility] [address] )    CKD (chronic kidney disease)  Resolved Problems:    Partial gastric outlet obstruction    Hypernatremia    Acute duodenal ulcer with bleeding    Acute blood loss anemia    Hospital Course:     Gilmer Esposito Sr  is a 81 y o  male patient who originally presented to the hospital on 9/28/2017 due to UGIB and partial outlet obstruction  He underwent EGD with GI and found to have multiple gastric ulcers and duodenal ulcers that were clipped and treated with Epi  The inflammation related to these was thought to be cause of the obstruction  Patient evaluated by sx and surgical intervention needed  His Hgb remained stable in mid to low 9  He was d/c home with BID PPI  AVS with clear instructions not to take NSAIDs,  Ok to take baby aspirin  Gastrin will be FU as OP wit GI  MARANDA of unclear origin, improved at time of d/c and he will FU as OP with nephrology  As he does have diastolic CHF, which did not exacerbate, and he did develop iatrogenic fluid OD, he was d/c on lasix 40 mg daily as also suggested by nephrology with potassium chloride 10 mEq daily    Reactive leukocytosis resolved at d/c  Hypoglycemia resolved at time of d/c, patient d/c home with in house dose of U 500      Condition at Discharge: good     Discharge Day Visit / Exam:     Subjective:  Patient is feeling well and states that he is ready to go home  Vitals: Blood Pressure: 117/57 (10/05/17 0708)  Pulse: 71 (10/05/17 0708)  Temperature: 97 5 °F (36 4 °C) (10/05/17 0708)  Temp Source: Oral (10/05/17 0708)  Respirations: 18 (10/05/17 0708)  Height: 6' (182 9 cm) (10/02/17 1031)  Weight - Scale: 125 kg (275 lb 11 2 oz) (10/03/17 1440)  SpO2: 96 % (10/05/17 0752)  Exam:   Physical Exam   Constitutional: He is oriented to person, place, and time  He appears well-developed  Cardiovascular: Normal rate, regular rhythm and normal heart sounds  Exam reveals no friction rub  No murmur heard  Pulmonary/Chest: Effort normal and breath sounds normal  No respiratory distress  He has no wheezes  He has no rales  Abdominal: Soft  Bowel sounds are normal  He exhibits no distension  There is no tenderness  There is no rebound  Musculoskeletal: He exhibits edema (trace B/L LE)  Neurological: He is alert and oriented to person, place, and time  He exhibits normal muscle tone  Skin: Skin is warm  Psychiatric: He has a normal mood and affect  Discharge instructions/Information to patient and family:   See after visit summary for information provided to patient and family  Provisions for Follow-Up Care:  See after visit summary for information related to follow-up care and any pertinent home health orders  Disposition: See After Visit Summary for discharge disposition information  Planned Readmission: none     Discharge Statement:  I spent > 30 minutes discharging the patient  This time was spent on the day of discharge  I had direct contact with the patient on the day of discharge  Greater than 50% of the total time was spent examining patient, answering all patient questions, arranging and discussing plan of care with patient as well as directly providing post-discharge instructions  Additional time then spent on discharge activities  Discharge Medications:  See after visit summary for reconciled discharge medications provided to patient and family        ** Please Note: Dragon 360 Dictation voice to text software may have been used in the creation of this document   **

## 2017-10-05 NOTE — PROGRESS NOTES
RN notified that blood glucose in AM BMP 37  Fingerstick glucose 39  Patient denies any symptoms of hypoglycemia and is sitting up at the side of the bed playing solitaire on his phone  Patient provided with orange juice and multiple packs of isaak crackers  Ricardo Kirby PA-C with IM aware  No new orders received at this time  Will follow up with [facility] day team to address insulin regimen  Will monitor blood glucose and pass on to day shift RN

## 2017-10-05 NOTE — PROGRESS NOTES
NEPHROLOGY PROGRESS NOTE   Denzel Monterroso  81 y o  male MRN: [id number]  Unit/Bed#: [facility] 826-01 Encounter: [id number]  Reason for Consult: MARANDA/CKd    ASSESSMENT AND PLAN:  Nonoliguric MARANDA on CKD stage III with baseline serum creatinine 1 2  - creatinine on admission 1 5 improved to 1 2 at baseline and then again worsened to 1 6  Cr now improving to 1 4 today  - MARANDA could be secondary to worsening anemia (Hb dropped from 11's to 7 3 on 10/2) + ? septic ATN; other differential AIN  - UA bland  UACR non significant  bladder scan for PVR 39 ml    - CT scan of abdomen with IV contrast on admission showed no hydronephrosis  - accurate intake and output      hypokalemia, resolved with replacement  - serum magnesium normal  Continue to monitor serum potassium      Mild volume overload, currently patient is on RA  - CXR shows small pleural effusion and COPD  status post Lasix 40 mg IV once yesterday with excellent UO response  Start lasix 40 mg po daily on discharge along with KCL 10 meq po daily       Severe anemia, Hb overall stable  - continue to closely monitor, transfuse p r n  for hemoglobin less than seven     Hypertension, blood pressure well controlled, avoid low BP  Discussed with Dr Celia Rodriguez  Stable from nephrology standpoint to get discharged  Will need repeat BMP in 1 week (order placed) and outpatient nephrology follow up  SUBJECTIVE:  Patient seen and examined at bedside  No chest pain, shortness of breath, nausea, vomiting, abdominal pain or diarrhea  No urinary complaints       OBJECTIVE:  Current Weight: Weight - Scale: 125 kg (275 lb 11 2 oz)  Vitals:    10/05/17 0752   BP:    Pulse:    Resp:    Temp:    SpO2: 96%       Intake/Output Summary (Last 24 hours) at 10/05/17 0924  Last data filed at 10/05/17 0858   Gross per 24 hour   Intake              460 ml   Output             3675 ml   Net            -3215 ml       Physical Examination:  General: no acute distress   HEENT: PERRLa, EOMI  Neck: supple  Respiratory:  B/l air entry present  CVS: s1, s2 present  GI: soft, NT, ND, BS+nt  CNS: AAOX3  Extremities: 1+ edema in LE  Skin:  Chronic skin changes in legs       Medications:    Current Facility-Administered Medications:     acetaminophen (TYLENOL) tablet 650 mg, 650 mg, Oral, Q6H PRN, Maria Isabel De Oliveira MD    albuterol inhalation solution 2 5 mg, 2 5 mg, Nebulization, Q4H PRN, Chastity Muñoz MD    ceFAZolin (ANCEF) IVPB (premix) 1,000 mg, 1,000 mg, Intravenous, Q8H, Eagle Ha PA-C, Last Rate: 100 mL/hr at 10/05/17 0859, 1,000 mg at 10/05/17 0859    cyclobenzaprine (FLEXERIL) tablet 10 mg, 10 mg, Oral, HS, Rhiannon Sher PA-C, 10 mg at 10/04/17 2141    docusate sodium (COLACE) capsule 100 mg, 100 mg, Oral, BID, Maria Isabel De Oliveira MD, 100 mg at 10/04/17 1657    enoxaparin (LOVENOX) subcutaneous injection 40 mg, 40 mg, Subcutaneous, Daily, Chastity Muñoz MD, 40 mg at 10/05/17 0903    gabapentin (NEURONTIN) capsule 300 mg, 300 mg, Oral, HS, Eagle Ha PA-C, 300 mg at 10/04/17 2140    insulin lispro (HumaLOG) 100 units/mL subcutaneous injection 4-20 Units, 4-20 Units, Subcutaneous, HS, Wan Bowie MD, 4 Units at 10/04/17 2141    insulin lispro (HumaLOG) 100 units/mL subcutaneous injection 5-25 Units, 5-25 Units, Subcutaneous, TID AC, 10 Units at 10/05/17 0725 **AND** Fingerstick Glucose (POCT), , , TID AC, Sean Santizo MD    insulin regular (HumuLIN R U-500) 500 units/mL CONCENTRATED injection 35 Units, 35 Units, Subcutaneous, Before Lexie Alexander MD, 35 Units at 10/04/17 1656    insulin regular (HumuLIN R U-500) 500 units/mL CONCENTRATED injection 55 Units, 55 Units, Subcutaneous, Daily Before Breakfast, Sean Santizo MD, 55 Units at 10/05/17 0726    insulin regular (HumuLIN R U-500) 500 units/mL CONCENTRATED injection 55 Units, 55 Units, Subcutaneous, Daily Before Lunch, Wan Bowie MD, 55 Units at 10/04/17 1201    lidocaine (LIDODERM) 5 % patch 1 patch, 1 patch, Transdermal, Daily, Sahara Stewart PA-C, Stopped at 10/05/17 9510    Menthol lozenge 5 4 mg, 1 lozenge, Mouth/Throat, Q2H PRN, Christina Keiser, 5 4 mg at 10/01/17 1822    metoprolol tartrate (LOPRESSOR) tablet 25 mg, 25 mg, Oral, Q12H [address], Baljit Cheese, DO, 25 mg at 10/05/17 0859    ondansetron (ZOFRAN) injection 4 mg, 4 mg, Intravenous, Q6H PRN, Doris Garcia MD    oxyCODONE (ROXICODONE) IR tablet 2 5 mg, 2 5 mg, Oral, Q4H PRN **OR** oxyCODONE (ROXICODONE) IR tablet 5 mg, 5 mg, Oral, Q4H PRN, Nancy Lozoya MD, 5 mg at 10/04/17 2238    pantoprazole (PROTONIX) EC tablet 40 mg, 40 mg, Oral, BID AC, Deepti Howe PA-C, 40 mg at 10/05/17 0703    phenol (CHLORASEPTIC) 1 4 % mucosal liquid 2 spray, 2 spray, Mouth/Throat, Q2H PRN, Christina Rivero, 2 spray at 09/29/17 2109    senna-docusate sodium (SENOKOT S) 8 6-50 mg per tablet 1 tablet, 1 tablet, Oral, HS, Nancy Lozoya MD, 1 tablet at 10/03/17 2154    tiotropium (SPIRIVA) capsule for inhaler 18 mcg, 18 mcg, Inhalation, Daily, Doris Garcia MD, Stopped at 10/04/17 2141    Laboratory Results:    Results from last 7 days  Lab Units 10/05/17  0452 10/04/17  0503 10/03/17  1226 10/03/17  0942 10/03/17  0029 10/02/17  1900 10/02/17  0521 10/01/17  0505 09/30/17  0544  09/29/17  0453 09/28/17  1730 09/28/17  1207   WBC Thousand/uL 9 84 14 11*  --   --   --   --  11 43* 11 92* 16 82*  --  13 13*  --  15 16*   HEMOGLOBIN g/dL 9 0* 9 6* 8 7*  --  8 4* 7 3* 9 8* 10 5* 11 0*  < > 11 2*  --  11 3*   HEMATOCRIT % 30 0* 32 5* 29 5*  --  28 1* 24 7* 34 0* 35 8* 36 2*  < > 38 9  --  37 8   PLATELETS Thousands/uL 182 257  --   --   --   --  203 190 216  --  230 239 268   SODIUM mmol/L 142 140  --  138  --   --  147* 148* 143  --  141  --  138   POTASSIUM mmol/L 4 1 3 1*  --  3 9  --   --  3 9 4 1 4 9  --  5 1  --  5 3   CHLORIDE mmol/L 102 102  --  99*  --   --  107 111* 108  --  106  --  105   CO2 mmol/L 34* 30  --  34*  --   --  34* 33* 31  --  29  --  26   BUN mg/dL 28* 34*  --  30*  --   --  30* 27* 33*  --  51*  --  51*   CREATININE mg/dL 1 48* 1 67*  --  1 47*  --   --  1 24 1 28 1 29  --  1 49*  --  1 58*   CALCIUM mg/dL 7 9* 8 0*  --  8 6  --   --  8 6 8 8 8 7  --  9 1  --  9 2   MAGNESIUM mg/dL 2 4 2 2  --  2 0  --   --  2 4  --   --   --  2 8*  --   --    PHOSPHORUS mg/dL 3 6 3 8  --  3 3  --   --  2 6  --   --   --   --   --   --    ALBUMIN g/dL  --   --   --   --   --   --  2 4*  --  2 7*  --   --   --  2 8*   TOTAL PROTEIN g/dL  --   --   --   --   --   --  6 8  --  7 5  --   --   --  7 5   GLUCOSE RANDOM mg/dL 146* 37*  --  168*  --   --  225* 253* 260*  --  172*  --  246*   < > = values in this interval not displayed  Results for orders placed during the hospital encounter of 07/16/17   XR chest 1 view portable    Narrative CHEST     INDICATION:  Chest and back pain  Shortness of breath  COMPARISON:  November 27, 2016  VIEWS:   AP frontal    IMAGES:  2    FINDINGS:  Examination is somewhat limited due to rotation and because costophrenic angles are excluded from the inferior margins of this film  Cardiomediastinal silhouette appears unremarkable  The lungs are clear  No pneumothorax or pleural effusion  Visualized osseous structures appear within normal limits for the patient's age  Impression No active pulmonary disease on slightly limited examination  Workstation performed: [id number]       Results for orders placed during the hospital encounter of 09/28/17   XR chest pa & lateral    Narrative CHEST     INDICATION:  Fever  COMPARISON:  9/28/2017    VIEWS:  Frontal and lateral projections    IMAGES:  3    FINDINGS:         Stable cardiomediastinal structures  Hyperinflated lung fields  No congestive failure  No pneumothorax  Flattening of the hemidiaphragms  Blunting of the costophrenic angles  Possible small right basilar effusion  Stable bony structures    Old right-sided rib fractures along the inferior lateral aspect of the chest       Impression Hyperinflation consistent with underlying COPD  Possible small right basilar effusion  Workstation performed: [id number]       Results for orders placed during the hospital encounter of 12/14/16   CT chest wo contrast    Narrative CT CHEST WITHOUT IV CONTRAST    INDICATION:  Abnormal chest x-ray    COMPARISON: Chest x-ray from 11/27/2016 and CT from 9/8/2015    TECHNIQUE: CT examination of the chest was performed without intravenous contrast   Axial, sagittal and coronal reformatted images were submitted for interpretation  Coronal thick section MIP (maximal intensity projection) images were also created  This examination, like all CT scans performed in the [facility], was performed utilizing techniques to minimize radiation dose exposure, including the use of iterative reconstruction and automated exposure control  FINDINGS:    LUNGS: There is no CT abnormality to correspond to the chest x-ray finding of increased left lower lobe density  This may have been related to overlying soft tissues  There is stable mild peribronchial thickening and bronchiectasis in the right middle   and lower lobes, likely postinflammatory  The 7 mm nodule in the right lower lobe seen previously has resolved  There is a focus of prominent, asymmetric peribronchial thickening in the right lower lobe measuring 4 mm best seen on series 601, image 131   which was not present previously  PLEURA:  Unremarkable  HEART/GREAT VESSELS:  Unremarkable for patient's age  MEDIASTINUM AND JEREMÍAS:  Unremarkable  CHEST WALL AND LOWER NECK:  Unremarkable  VISUALIZED STRUCTURES IN THE UPPER ABDOMEN:  See concurrent CT of the abdomen and pelvis  OSSEOUS STRUCTURES:  There are healing right-sided rib fractures  Impression 1  No CT abnormality to correspond to the chest x-ray finding of left lower lobe density    This may have been related to overlying soft tissues  2   Resolution of previous 7 mm nodule in the right lower lobe  However, there is a new focus of asymmetric peribronchial thickening measuring 4 mm in the right lower lobe  This is likely inflammatory though six-month follow-up CT is recommended to   exclude developing neoplasm  3   Stable right middle and lower lobe bronchiectasis and peribronchial thickening, likely postinflammatory  ##sigslh##sigslh    ##[id number]##[id number]      Workstation performed: [id number]       Results for orders placed during the hospital encounter of 12/14/16   CT abdomen pelvis w wo contrast    Narrative CT ABDOMEN AND PELVIS WITH AND WITHOUT IV CONTRAST    INDICATION:  Gross hematuria    COMPARISON:  9/10/2014    TECHNIQUE: CT of the kidneys was performed without intravenous contrast   Dynamic postcontrast CT evaluation of the abdomen and pelvis was performed in both nephrographic and delayed phases after the administration of intravenous contrast   This   examination, like all CT scans performed in the [facility], was performed utilizing techniques to minimize radiation dose exposure, including the use of iterative reconstruction and automated exposure control  Axial, sagittal and   coronal reformatted images were submitted for interpretation  IV Contrast:  iodixanol (VISIPAQUE) 320 MG/ML injection 100 mL  Note: (SINGLE DOSE/MULTI DOSE) information refers to the container from which the contrast was acquired  Contrast was injected one time intravenously without immediate complication  Enteric Contrast:  Enteric contrast was not administered  FINDINGS:    ABDOMEN    RIGHT KIDNEY AND URETER:  No solid renal mass  No detectable ureteral mass  No hydronephrosis or hydroureter  No urinary tract calculi  No perinephric collection  LEFT KIDNEY AND URETER:  No solid renal mass  No detectable ureteral mass  No hydronephrosis or hydroureter  No urinary tract calculi   No perinephric collection  URINARY BLADDER:  No bladder wall mass  No calculi  LUNG BASES:  See concurrent chest CT  LIVER/BILIARY TREE:  There is hepatic steatosis  GALLBLADDER:  No calcified gallstones  No pericholecystic inflammatory change  SPLEEN:  Unremarkable  PANCREAS:  Unremarkable  ADRENAL GLANDS:  There is stable hyperplasia of the left adrenal gland  STOMACH, BOWEL AND APPENDIX:  Unremarkable  ABDOMINOPELVIC CAVITY:  No ascites  No free intraperitoneal air  No lymphadenopathy  VESSELS:  Unremarkable for patient's age  PELVIS    REPRODUCTIVE ORGANS:  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  ABDOMINAL WALL/INGUINAL REGIONS:  There is a small amount of gas over the right anterior abdominal wall  OSSEOUS STRUCTURES:  No acute fracture or destructive osseous lesion  Impression 1  No CT abnormality identified to account for hematuria  2   Small amount of gas over the right anterior abdominal wall  Correlation for subcutaneous injection recommended  3   Hepatic steatosis  4   Stable left adrenal hyperplasia  Workstation performed: [id number]       No results found for this or any previous visit  No results found for this or any previous visit  Portions of the record may have been created with voice recognition software  Occasional wrong word or "sound a like" substitutions may have occurred due to the inherent limitations of voice recognition software  Read the chart carefully and recognize, using context, where substitutions have occurred

## 2017-10-05 NOTE — DISCHARGE INSTRUCTIONS
Dear Joan Wallis,  Please note the following:  - until seen by Dr Lizandro Mathews, take protonix twice per day  - no coffee, spicy food, citrus like lemon-orange, no tomatoes (follow the diet given to you)  - you will need to FU now with a kidney doctor  - do not take NSAIDs (like aleve, naproxene, high dose aspirin, ibuprofen etc  )        Acute Posthemorrhagic Anemia   AMBULATORY CARE:   Acute posthemorrhagic anemia  is a condition that develops when you lose a large amount of blood quickly  Anemia is a low number of red blood cells or a low amount of hemoglobin in your red blood cells  Hemoglobin is a protein that helps red blood cells carry oxygen throughout your body  Common signs and symptoms of acute posthemorrhagic anemia:  You may have any of the following, depending on how much blood you lost:  · Feeling weak, tired, dizzy, or lightheaded    · A fast or irregular heartbeat    · Pale or cold, clammy skin     · Shortness of breath, or fast, shallow breaths    · Nausea or loss of appetite    · Urinating little or not at all    · Trouble concentrating, or confusion    · Headache or seizures    · Chest pain or sweating  Call 911 or have someone call 911 for any of the following:   · You lose consciousness or cannot be woken  · You have severe chest pain  Seek care immediately if:   · You have dark or bloody bowel movements  Contact your healthcare provider if:   · Your symptoms are worse, even after treatment  · You have questions or concerns about your condition or care  Treatment:  Your healthcare provider may have you stop any medicines that are causing bleeding  Treatment depends on the amount of blood you lost, the cause of the bleeding, and your symptoms:  · A blood transfusion  may be needed if your body cannot replace the blood you have lost     · Surgery  may be needed to stop the bleeding, or to fix an injury  · Fluids  may be given through an IV to help increase your blood pressure       · Iron supplements  may be given if your iron level is too low  · Extra oxygen  may be needed if your oxygen level is too low  Manage your symptoms:   · Rest as needed  Anemia may make you more tired than usual  Rest will help you heal and can help prevent more bleeding  · Eat healthy foods rich in iron together with foods rich in vitamin C  Nuts, meat, dark leafy green vegetables, and beans are high in iron and protein  Vitamin C helps your body absorb iron  Foods rich in vitamin C include oranges and other citrus fruits  Ask your healthcare provider for a list of other foods that are high in iron or vitamin C  Ask if you need to be on a special diet  · Drink liquids as directed  Ask your healthcare provider how much liquid to drink and which liquids are best for you  For most people, good liquids are water, juice, and milk  Follow up with your healthcare provider as directed:  Write down your questions so you remember to ask them during your visits  © 2017 [address] Information is for End User's use only and may not be sold, redistributed or otherwise used for commercial purposes  All illustrations and images included in CareNotes® are the copyrighted property of [employer]  or Onofre Carroll  The above information is an  only  It is not intended as medical advice for individual conditions or treatments  Talk to your doctor, nurse or pharmacist before following any medical regimen to see if it is safe and effective for you  Acute Posthemorrhagic Anemia   WHAT YOU NEED TO KNOW:   Acute posthemorrhagic anemia is a condition that develops when you lose a large amount of blood quickly  Anemia is a low number of red blood cells or a low amount of hemoglobin in your red blood cells  Hemoglobin is a protein that helps red blood cells carry oxygen throughout your body    DISCHARGE INSTRUCTIONS:   Call 911 or have someone call 911 for any of the following:   · You lose consciousness or cannot be woken  · You have severe chest pain  Seek care immediately if:   · You have dark or bloody bowel movements  Contact your healthcare provider if:   · Your symptoms are worse, even after treatment  · You have questions or concerns about your condition or care  Medicines:   · Iron supplements  may be given if your iron level is too low  · Take your medicine as directed  Contact your healthcare provider if you think your medicine is not helping or if you have side effects  Tell him or her if you are allergic to any medicine  Keep a list of the medicines, vitamins, and herbs you take  Include the amounts, and when and why you take them  Bring the list or the pill bottles to follow-up visits  Carry your medicine list with you in case of an emergency  Manage your symptoms:   · Rest as needed  Anemia may make you more tired than usual  Rest will help you heal and can help prevent more bleeding  · Eat healthy foods rich in iron together with foods rich in vitamin C  Nuts, meat, dark leafy green vegetables, and beans are high in iron and protein  Vitamin C helps your body absorb iron  Foods rich in vitamin C include oranges and other citrus fruits  Ask your healthcare provider for a list of other foods that are high in iron or vitamin C  Ask if you need to be on a special diet  · Drink liquids as directed  Ask your healthcare provider how much liquid to drink and which liquids are best for you  For most people, good liquids are water, juice, and milk  Follow up with your healthcare provider as directed:  Write down your questions so you remember to ask them during your visits  © 2017 [address] [facility] Information is for End User's use only and may not be sold, redistributed or otherwise used for commercial purposes  All illustrations and images included in CareNotes® are the copyrighted property of [employer] M [employer]  or Onofre Carroll    The above information is an  only  It is not intended as medical advice for individual conditions or treatments  Talk to your doctor, nurse or pharmacist before following any medical regimen to see if it is safe and effective for you  Chronic Kidney Disease, Ambulatory Care   GENERAL INFORMATION:   Chronic kidney disease  is the gradual and permanent loss of kidney function  Normally, the kidneys turn fluid, chemicals, and waste from your blood into urine  When you have chronic kidney disease (CKD), your kidneys do not function properly  CKD may worsen over time and lead to kidney failure  Common symptoms include the following:   · Changes in how often you need to urinate    · Swelling in your arms, legs, or feet    · Shortness of breath    · Fatigue or weakness    · Bad or bitter taste in your mouth    · Nausea, vomiting, or loss of appetite  Seek immediate care for the following symptoms:   · Heart is beating faster than normal for you    · Confused and drowsiness    · Seizure    · Sudden chest pain or shortness of breath  Treatment for chronic kidney disease:  Medicines may be given to decrease blood pressure and get rid of extra fluid  You may also receive medicine to manage health conditions that may occur with CKD  Dialysis is a treatment to remove chemicals and waste from your blood when your kidneys can no longer do this  Surgery may be needed to create an arteriovenous fistula (AVF) in your arm or insert a catheter into your abdomen so that you can receive dialysis  A kidney transplant may be done if your CKD becomes severe  Manage chronic kidney disease:   · Maintain a healthy weight  Ask your healthcare provider how much you should weigh  Ask him to help you create a weight loss plan if you are overweight  · Exercise 30 to 60 minutes a day, 4 to 7 times a week, or as directed  Ask about the best exercise plan for you   Regular exercise can help you manage CKD, high blood pressure, and diabetes  · Follow your healthcare provider's advice about what to eat and drink  He may tell you to eat food low in sodium (salt), potassium, phosphorus, or protein  You may need to see a dietitian if you need help planning meals  · Limit alcohol  Ask how much alcohol is safe for you to drink  A drink of alcohol is 12 ounces of beer, 5 ounces of wine, or 1½ ounces of liquor  · Do not smoke  Smoking harms your kidneys  If you smoke, it is never too late to quit  Ask for information if you need help quitting  · Ask your healthcare provider if you need vaccines  Infections such as pneumonia, influenza, and hepatitis can be more harmful or more likely to occur when you have CKD  Vaccines reduce your risk of infection with these viruses  Follow up with your healthcare provider as directed:  Write down your questions so you remember to ask them during your visits  CARE AGREEMENT:   You have the right to help plan your care  Learn about your health condition and how it may be treated  Discuss treatment options with your caregivers to decide what care you want to receive  You always have the right to refuse treatment  The above information is an  only  It is not intended as medical advice for individual conditions or treatments  Talk to your doctor, nurse or pharmacist before following any medical regimen to see if it is safe and effective for you  © 2014 [address] is for End User's use only and may not be sold, redistributed or otherwise used for commercial purposes  All illustrations and images included in CareNotes® are the copyrighted property of [employer] D [employer] M [employer]  or Onofre Carroll  Chronic Kidney Disease, Ambulatory Care   Padmini H & Yuan WL: Clinical practice  Stage IV chronic kidney disease  N Engl J Med 2010; 362(1):56-65  Mary GR: Chronic Kidney Disease  In: Maryse RODRIGUEZ, Hilda CANO, et al, eds   Principles of Ambulatory Medicine, 7th ed  [address] [facility],[address] [place] [address], 2007  Yandel BRICEÑO & Ferny DE LA CRUZ: A Stepped Care Approach to the Management of Chronic Kidney Disease  In: Ferny DE LA CRUZ, Fatimah CANO, et al, [place]  Joann Bail and Emmanuel The Kidney, 9th ed  [address], [place], [place], 2012  [address] of Nephrology: Evidence-based practice guideline for the treatment of CKD  Clin Exp Nephrol 2009; 13(6):537-566  Jonathan [facility]: Chronic Kidney Disease  In: Jose FJ, ed  The 5-Minute Clinical Consult 2014, 22nd ed  [address], 2014 © 2014 [address] is for End User's use only and may not be sold, redistributed or otherwise used for commercial purposes  All illustrations and images included in CareNotes® are the copyrighted property of [employer]  or Onofre Carroll  The above information is an  only  It is not intended as medical advice for individual conditions or treatments  Talk to your doctor, nurse or pharmacist before following any medical regimen to see if it is safe and effective for you  Duodenitis   WHAT YOU NEED TO KNOW:   Duodenitis is inflammation or irritation of the duodenum  The duodenum is the first part of the small intestine, just below your stomach  DISCHARGE INSTRUCTIONS:   Seek care immediately if:   · You have severe abdominal pain  · You have bloody or black, tarry bowel movements or vomit  Contact your healthcare provider if:   · You have new or worsening symptoms, even after treatment  · You have questions or concerns about your condition or care  Medicines:   · Medicines  may be given to treat an infection or to control stomach acid  · Take your medicine as directed  Contact your healthcare provider if you think your medicine is not helping or if you have side effects  Tell him or her if you are allergic to any medicine   Keep a list of the medicines, vitamins, and illustrations and images included in CareNotes® are the copyrighted property of [employer] D [employer] M [employer]  or Onofre Carroll  The above information is an  only  It is not intended as medical advice for individual conditions or treatments  Talk to your doctor, nurse or pharmacist before following any medical regimen to see if it is safe and effective for you  Duodenitis   WHAT YOU NEED TO KNOW:   Duodenitis is inflammation or irritation of the duodenum  The duodenum is the first part of the small intestine, just below your stomach  DISCHARGE INSTRUCTIONS:   Return to the emergency department if:   · You have severe abdominal pain  · You have bloody or black, tarry bowel movements or vomit  Contact your healthcare provider if:   · You have new or worsening symptoms, even after treatment  · You have questions or concerns about your condition or care  Medicines:   · Medicines  may be given to treat an infection or to control stomach acid  · Take your medicine as directed  Contact your healthcare provider if you think your medicine is not helping or if you have side effects  Tell him of her if you are allergic to any medicine  Keep a list of the medicines, vitamins, and herbs you take  Include the amounts, and when and why you take them  Bring the list or the pill bottles to follow-up visits  Carry your medicine list with you in case of an emergency  Follow up with your healthcare provider as directed: You may need ongoing tests or treatment  Write down your questions so you remember to ask them during your visits  Do not smoke:  Nicotine and other chemicals in cigarettes and cigars can cause blood vessel and lung damage  Ask your healthcare provider for information if you currently smoke and need help to quit  E-cigarettes or smokeless tobacco still contain nicotine  Talk to your healthcare provider before you use these products    Limit or do not drink alcohol:  Alcohol can make your duodenitis worse  Talk to your healthcare provider if you need help to stop drinking  Keep batteries and similar objects out of the reach of children:  Babies often put items in their mouths to explore them  Button batteries are easy to swallow and can cause serious damage  Keep the battery covers of electronic devices such as remote controls taped closed  Store all batteries and toxic materials where children cannot get to them  Use childproof locks to keep children away from dangerous materials  Manage or prevent duodenitis:   · Do not take NSAIDs or aspirin unless directed  These and similar medicines can cause irritation  It may help to take NSAIDs with food, but you may not be able to take them at all  · Do not eat foods that cause irritation  Foods such as oranges and salsa can cause burning or pain  Eat a variety of healthy foods  Examples include fruits (not citrus), vegetables, low-fat dairy products, beans, whole-grain breads, and lean meats and fish  Try to eat small meals, and drink water with your meals  Do not eat for at least 3 hours before you go to bed  © 2017 [address] [facility] Information is for End User's use only and may not be sold, redistributed or otherwise used for commercial purposes  All illustrations and images included in CareNotes® are the copyrighted property of [employer]  or Onofre Carroll  The above information is an  only  It is not intended as medical advice for individual conditions or treatments  Talk to your doctor, nurse or pharmacist before following any medical regimen to see if it is safe and effective for you  Gastrointestinal Bleeding   WHAT YOU NEED TO KNOW:   Gastrointestinal (GI) bleeding may occur in any part of your digestive tract  This includes your esophagus, stomach, intestines, rectum, or anus  Bleeding may be mild to severe  Your bleeding may begin suddenly, or start slowly and last for a longer period of time  Bleeding that lasts for a longer period of time is called chronic GI bleeding  DISCHARGE INSTRUCTIONS:   Seek care immediately if:   · Your symptoms return  Contact your healthcare provider if:   · You have nausea or are vomiting  · You have heartburn  · You have questions or concerns about your condition or care  Activity:  Rest as directed  Ask when you can return to your usual activities, such as work  Slowly do more each day  Nutrition:  Ask if you need to be on a special diet  A special diet can help treat GI conditions and prevent problems such as GI bleeding  Eat small meals more often while your digestive system heals  Avoid or limit caffeine and spicy foods  Also avoid foods that cause heartburn, nausea, or diarrhea  Prevent GI bleeding:   · Manage GI conditions as directed  Examples of GI conditions include gastroesophageal reflux, peptic ulcer disease, and ulcerative colitis  Take all medicines for these conditions as directed  · Limit or do not take NSAIDs  Ask your healthcare provider if it is safe for you to take NSAIDs  NSAIDs can increase your risk for ulcers and GI bleeding  · Do not drink alcohol  Alcohol can cause ulcers and esophageal varices  Esophageal varices are swollen blood vessels in your esophagus  Over time the blood vessels become weak and may bleed  · Do not smoke  Nicotine and other chemicals in cigarettes and cigars can increase your risk for ulcers  Ask your healthcare provider for information if you currently smoke and need help to quit  E-cigarettes or smokeless tobacco still contain nicotine  Talk to your healthcare provider before you use these products  Follow up with your healthcare provider as directed: You may need to return for a colonoscopy, endoscopy, or other tests  These tests can make sure you do not have more bleeding  Write down your questions so you remember to ask them during your visits     © 2017 [address] [facility] Information is for End User's use only and may not be sold, redistributed or otherwise used for commercial purposes  All illustrations and images included in CareNotes® are the copyrighted property of [employer] M [employer]  or Onofre Carroll  The above information is an  only  It is not intended as medical advice for individual conditions or treatments  Talk to your doctor, nurse or pharmacist before following any medical regimen to see if it is safe and effective for you

## 2017-10-08 LAB — GASTRIN SERPL-MCNC: 149 PG/ML (ref 0–115)

## 2017-10-12 ENCOUNTER — ALLSCRIPTS OFFICE VISIT (OUTPATIENT)
Dept: OTHER | Facility: OTHER | Age: 72
End: 2017-10-12

## 2017-10-19 ENCOUNTER — ALLSCRIPTS OFFICE VISIT (OUTPATIENT)
Dept: OTHER | Facility: OTHER | Age: 72
End: 2017-10-19

## 2017-10-20 ENCOUNTER — TRANSCRIBE ORDERS (OUTPATIENT)
Dept: LAB | Facility: HOSPITAL | Age: 72
End: 2017-10-20

## 2017-10-20 ENCOUNTER — LAB CONVERSION - ENCOUNTER (OUTPATIENT)
Dept: OTHER | Facility: OTHER | Age: 72
End: 2017-10-20

## 2017-10-20 ENCOUNTER — APPOINTMENT (OUTPATIENT)
Dept: LAB | Facility: HOSPITAL | Age: 72
End: 2017-10-20
Payer: COMMERCIAL

## 2017-10-20 DIAGNOSIS — D64.9 ANEMIA, UNSPECIFIED TYPE: Primary | ICD-10-CM

## 2017-10-20 DIAGNOSIS — I10 ESSENTIAL HYPERTENSION, MALIGNANT: ICD-10-CM

## 2017-10-20 DIAGNOSIS — D64.9 ANEMIA, UNSPECIFIED TYPE: ICD-10-CM

## 2017-10-20 LAB
ANION GAP SERPL CALCULATED.3IONS-SCNC: 3 MMOL/L (ref 4–13)
BASOPHILS # BLD AUTO: 0.02 THOUSANDS/ΜL (ref 0–0.1)
BASOPHILS NFR BLD AUTO: 0 % (ref 0–1)
BUN SERPL-MCNC: 17 MG/DL (ref 5–25)
CALCIUM SERPL-MCNC: 8.6 MG/DL (ref 8.3–10.1)
CHLORIDE SERPL-SCNC: 95 MMOL/L (ref 100–108)
CO2 SERPL-SCNC: 36 MMOL/L (ref 21–32)
CREAT SERPL-MCNC: 1.27 MG/DL (ref 0.6–1.3)
EOSINOPHIL # BLD AUTO: 0.28 THOUSAND/ΜL (ref 0–0.61)
EOSINOPHIL NFR BLD AUTO: 3 % (ref 0–6)
ERYTHROCYTE [DISTWIDTH] IN BLOOD BY AUTOMATED COUNT: 17.7 % (ref 11.6–15.1)
FERRITIN SERPL-MCNC: 28 NG/ML (ref 8–388)
GFR SERPL CREATININE-BSD FRML MDRD: 57 ML/MIN/1.73SQ M
GLUCOSE P FAST SERPL-MCNC: 118 MG/DL (ref 65–99)
HCT VFR BLD AUTO: 33.9 % (ref 36.5–49.3)
HGB BLD-MCNC: 9.7 G/DL (ref 12–17)
LYMPHOCYTES # BLD AUTO: 1.32 THOUSANDS/ΜL (ref 0.6–4.47)
LYMPHOCYTES NFR BLD AUTO: 12 % (ref 14–44)
MCH RBC QN AUTO: 22.7 PG (ref 26.8–34.3)
MCHC RBC AUTO-ENTMCNC: 28.6 G/DL (ref 31.4–37.4)
MCV RBC AUTO: 79 FL (ref 82–98)
MONOCYTES # BLD AUTO: 0.92 THOUSAND/ΜL (ref 0.17–1.22)
MONOCYTES NFR BLD AUTO: 9 % (ref 4–12)
NEUTROPHILS # BLD AUTO: 8.25 THOUSANDS/ΜL (ref 1.85–7.62)
NEUTS SEG NFR BLD AUTO: 76 % (ref 43–75)
NRBC BLD AUTO-RTO: 0 /100 WBCS
PLATELET # BLD AUTO: 175 THOUSANDS/UL (ref 149–390)
PMV BLD AUTO: 10.3 FL (ref 8.9–12.7)
POTASSIUM SERPL-SCNC: 4.7 MMOL/L (ref 3.5–5.3)
RBC # BLD AUTO: 4.27 MILLION/UL (ref 3.88–5.62)
SODIUM SERPL-SCNC: 134 MMOL/L (ref 136–145)
WBC # BLD AUTO: 10.85 THOUSAND/UL (ref 4.31–10.16)

## 2017-10-20 PROCEDURE — 80048 BASIC METABOLIC PNL TOTAL CA: CPT

## 2017-10-20 PROCEDURE — 36415 COLL VENOUS BLD VENIPUNCTURE: CPT

## 2017-10-20 PROCEDURE — 82728 ASSAY OF FERRITIN: CPT

## 2017-10-20 PROCEDURE — 85025 COMPLETE CBC W/AUTO DIFF WBC: CPT

## 2017-10-20 NOTE — PROGRESS NOTES
Assessment   1  Chronic obstructive pulmonary disease (496) (J44 9)   2  Cor pulmonale (416 9) (I27 81)    Plan   Chronic obstructive pulmonary disease, Cor pulmonale    · Spiriva Respimat 2 5 MCG/ACT Inhalation Aerosol Solution; INHALE 2 PUFFS    ONCE DAILY   Rx By: Johnna Walker; Dispense: 0 Days ; #:1 X 4 GM Inhaler; Refill: 5;For: Chronic obstructive pulmonary disease, Cor pulmonale; MAE = N; Verified Transmission to [address] [place] [address] ; Last Updated By: System, [employer]; 10/19/2017 2:46:50 PM  Cor pulmonale    · [facility]  *  Status: Hold For -    Scheduling  Requested for: 30CII0334   Ordered; For: Cor pulmonale; Ordered By: Johnna Walker Performed:  Due: 29EDD5818  Care Summary provided  : Yes    Active Problems   1  Acute duodenal ulcer with hemorrhage (532 00) (K26 0)   2  Chronic back pain (724 5,338 29) (M54 9,G89 29)   3  Chronic obstructive pulmonary disease (496) (J44 9)   4  Colon cancer screening (V76 51) (Z12 11)   5  Cor pulmonale (416 9) (I27 81)   6  Coronary artery disease involving native heart without angina pectoris (414 01) (I25 10)   7  Current use of insulin (V58 67) (Z79 4)   8  Diabetes mellitus type 2, uncontrolled (250 02) (E11 65)   9  Diabetic neuropathy (250 60,357 2) (E11 40)   10  Dyslipidemia (272 4) (E78 5)   11  Essential hypertension (401 9) (I10)   12  GERD (gastroesophageal reflux disease) (530 81) (K21 9)   13  Gross hematuria (599 71) (R31 0)   14  Hypoglycemia associated with diabetes (250 80) (E11 649)   15  Morbid obesity with BMI of 40 0-44 9, adult (278 01,V85 41) (E66 01,Z68 41)   16  Need for pneumococcal vaccination (V03 82) (Z23)   17  Nicotine dependence (305 1) (F17 200)   18  Obstructive sleep apnea (327 23) (G47 33)   19  Other nonspecific abnormal finding of lung field (793 19) (R91 8)   20  Pleural Effusion (511 9)   21  Prostate cancer (185) (C61)   22  RBBB (right bundle branch block) (426 4) (I45 10)   23   Screening for neurological condition (V80 09) (Z13 89)   24  Vertigo (780 4) (R42)    Past Medical History   1  History of Acute UTI (599 0) (N39 0)   2  Chronic obstructive pulmonary disease (496) (J44 9)   3  History of Coronary Artery Disease (V12 59)   4  History of appendicitis (V12 79) (Z87 19)   5  History of back pain (V13 59) (Z87 39)   6  History of cancer (V10 90) (Z85 9)   7  History of chronic obstructive lung disease (V12 69) (Z87 09)   8  History of diabetes mellitus (V12 29) (Z86 39)   9  History of erectile dysfunction (V13 89) (Z87 438)   10  History of hematuria (V13 09) (Z87 448)   11  History of hemorrhoids (V13 89) (Z87 19)   12  History of hypertension (V12 59) (Z86 79)   13  History of measles (V12 09) (Z86 19)   14  History of mumps (V12 09) (Z86 19)   15  History of pneumonia (V12 61) (Z87 01)   16  History of varicella (V12 09) (Z86 19)   17  History of Prostate Cancer (V10 46)    Surgical History   1  History of Appendectomy   2  History of Diagnostic Cystoscopy   3  History of Prostate Surgery    Family History   Mother    1  Family history of cerebrovascular accident (CVA) (V17 1) (Z82 3)   2  Family history of hypertension (V17 49) (Z82 49)   3  Denied: Family history of mental disorder  Father    4  Family history of asthma (V17 5) (Z82 5)   5  Family history of lung disease (V19 8) (Z83 6)   6  Family history of malignant neoplasm (V16 9) (Z80 9)   7  Denied: Family history of mental disorder    Social History    · Caffeine use (V49 89) (F15 90)   · Exercises rarely (V49 89) (Z78 9)   · Former smoker (N25 68) (O47 698)   · Has Quit 3 weeks ago (March 2013)   Smoked for 56 years, two packs of cigarettes a        day   Gradually dropped down to a pack a day and before he quit, was down to a pack        of cigarettes a week     · Lives with family   ·    · Denied: History of Never a smoker   · Never uses seat belt   · No alcohol use   · No living will   · No Amish beliefs   · Denied: History of No secondhand smoke exposure   · Denied: History of Recreational drug use   · Retired    Current Meds    1  Albuterol Sulfate (2 5 MG/3ML) 0 083% Inhalation Nebulization Solution; USE 1 UNIT     DOSE EVERY 4-6 HOURS AS NEEDED FOR WHEEZING ; Therapy: 08Apr2014 to (Last Rx:08Apr2014)  Requested for: 08Apr2014 Ordered   2  Albuterol Sulfate (2 5 MG/3ML) 0 083% Inhalation Nebulization Solution; USE 1 UNIT     DOSE EVERY 4-6 HOURS AS NEEDED FOR WHEEZING ; Therapy: 85VSK2265 to (Last Rx:21Mar2017)  Requested for: 21Mar2017 Ordered   3  Aspirin EC 81 MG Oral Tablet Delayed Release; take 1 tablet by mouth every day; Therapy: 11QRR9199 to Recorded   4  BD Insulin Syringe U-500 31G X 6MM 0 5 ML Miscellaneous; Use 3 per day as directed     with U-500 insulin; Therapy: 87MER2950 to (Evaluate:04Bfj4025)  Requested for: 62Azx0133; Last     Rx:15Aug2017 Ordered   5  Budesonide 0 5 MG/2ML Inhalation Suspension; USE 1 UNIT DOSE VIA NEBULIZER     TWO TIMES A DAY; Therapy: 29Apr2016 to (Last Rx:29Apr2016)  Requested for: 29Apr2016 Ordered   6  Cyclobenzaprine HCl - 10 MG Oral Tablet; One Tablet twice daily  Requested for:     06BTC4577; Last Rx:19Jan2017 Ordered   7  Fish Oil CAPS; One capsule twice daily; Therapy: ([facility]) to Recorded   8  HumuLIN R U-500 (CONCENTRATED) 500 UNIT/ML Subcutaneous Solution; INJECT 55     UNITS SQ at 8 AM, 55 UNITS at 12 PM, 45     UNITS at 6PM     ****USE WITH U-500 SYRINGE ONLY***;     Therapy: 35XFC3193 to (Evaluate:69Ihp4912)  Requested for: 17Wvr1362; Last     Rx:72Ocr9557 Ordered   9  Lisinopril 5 MG Oral Tablet; TAKE 1 TABLET DAILY; Last Rx:45Ivp9902 Ordered   10  Metoprolol Tartrate 25 MG Oral Tablet; Take 1 tablet twice daily  Requested for:      80PQO8072; Last Rx:19Jan2017 Ordered   11  OneTouch Ultra Blue In [employer]; Check BG 4x per day;       Therapy: 10ABO8690 to (Evaluate:90Zoh7724)  Requested for: 35POH6971; Last      Rx:19Jan2017 Ordered   12  Oxycodone-Acetaminophen 7 5-325 MG Oral Tablet; TAKE 1 TABLET EVERY 4 HOURS      AS NEEDED FOR BREAKTHROUGH PAIN;      Therapy: 11STI2517 to (Evaluate:31Mar2017); Last Rx:21Mar2017 Ordered   13  Simvastatin 10 MG Oral Tablet; TAKE 1 TABLET DAILY; Therapy: 29Apr2016 to Recorded   14  Stiolto Respimat 2 5-2 5 MCG/ACT Inhalation Aerosol Solution; 2 puffs once a day; Therapy: 73DAR9400 to (Last Rx:21Mar2017)  Requested for: 21Mar2017 Ordered    Allergies   1  MetFORMIN HCl TABS    Vitals   Vital Signs    Recorded: 19Oct2017 01:59PM   Temperature 98 6 F   Heart Rate 82   Respiration 20   Systolic 446 mm Hg   Diastolic 62 mm Hg   Height 6 ft    Weight 275 lb 4 oz   BMI Calculated 37 33 kg/m2   BSA Calculated 2 44 m2   O2 Saturation 96, Nasal Cannula   FiO2 3L/min, Nasal Cannula     Future Appointments      Date/Time Provider Specialty Site   11/03/2017 11:30 AM ANUJ Frias   Endocrinology [facility] ENDOCRINOLOGY   11/14/2017 09:30 AM Shukri Diallo  Nephrology [address]     Signatures    Electronically signed by : Natalie Gordon DO; Oct 19 2017  2:53PM EST                       (Author)

## 2017-10-20 NOTE — PROGRESS NOTES
Assessment   1  Chronic obstructive pulmonary disease (496) (J44 9)   2  Cor pulmonale (416 9) (I27 81)    Plan   Chronic obstructive pulmonary disease, Cor pulmonale    · Spiriva Respimat 2 5 MCG/ACT Inhalation Aerosol Solution; INHALE 2 PUFFS    ONCE DAILY   Rx By: Johnny Mcgarry; Dispense: 0 Days ; #:1 X 4 GM Inhaler; Refill: 5;For: Chronic obstructive pulmonary disease, Cor pulmonale; MAE = N; Verified Transmission to [address] [place] [address]; Last Updated By: System, [employer]; 10/19/2017 2:46:50 PM  Cor pulmonale    · [facility]  *  Status: Hold For -    Scheduling  Requested for: 82FJD1515   Ordered; For: Cor pulmonale; Ordered By: Johnny Mcgarry Performed:  Due: 32BEW3626  Care Summary provided  : Yes    Results/Data   (1) CBC/ PLT (NO DIFF) 22PQO1318 10:45AM EPIC, Provider   Test ordered by: [address] Name Result Flag Reference   HEMATOCRIT 32 5 % L 36 5-49 3   HEMOGLOBIN 10 3 g/dL L 12 0-17 0   MCHC 31 7 g/dL  31 4-37 4   MCH 26 8 pg  26 8-34 3   MCV 84 fL  82-98   PLATELET COUNT 546 Thousands/uL  149-390   RBC COUNT 3 85 Million/uL L 3 88-5 62   RDW 15 8 % H 11 6-15 1   WBC COUNT 11 56 Thousand/uL H 4 31-10 16   MPV 10 2 fL  8 9-12 7      Discussion/Summary   Discussion Summary:    Umm Harrington is stable with regards to his COPD however he still concerned with the fact that he can't ambulate without significant dyspnea  I did increase his Spiriva to the 2 5 daily now that he has insurance  I also instructed him to get a spacer device for his Symbicort and continue to take that twice a day  I do believe a nebulizer would help him with his breathing and COPD control and he plans to pay back young within the next month so that he can get a nebulizer  At that time he will call the office for a renewal of prescription for albuterol   I think is a good candidate for pulmonary rehab and he is agreeable so enrolled him in our [facility]'s pulmonary rehab program  He is up-to-date on his flu and pneumonia shot  I instructed him to please wear his oxygen 24/7 as he was satting 74% after ambulating on room air  Counseling Documentation With Imm: The patient was counseled regarding  Goals and Barriers: The patient has the current Goals: Breathe better  The patent has the current Barriers: Insurance issues and difficulty ambulating  Patient's Capacity to Self-Care: Patient is able to Self-Care  Active Problems   1  Acute duodenal ulcer with hemorrhage (532 00) (K26 0)   2  Chronic back pain (724 5,338 29) (M54 9,G89 29)   3  Chronic obstructive pulmonary disease (496) (J44 9)   4  Colon cancer screening (V76 51) (Z12 11)   5  Cor pulmonale (416 9) (I27 81)   6  Coronary artery disease involving native heart without angina pectoris (414 01) (I25 10)   7  Current use of insulin (V58 67) (Z79 4)   8  Diabetes mellitus type 2, uncontrolled (250 02) (E11 65)   9  Diabetic neuropathy (250 60,357 2) (E11 40)   10  Dyslipidemia (272 4) (E78 5)   11  Essential hypertension (401 9) (I10)   12  GERD (gastroesophageal reflux disease) (530 81) (K21 9)   13  Gross hematuria (599 71) (R31 0)   14  Hypoglycemia associated with diabetes (250 80) (E11 649)   15  Morbid obesity with BMI of 40 0-44 9, adult (278 01,V85 41) (E66 01,Z68 41)   16  Need for pneumococcal vaccination (V03 82) (Z23)   17  Nicotine dependence (305 1) (F17 200)   18  Obstructive sleep apnea (327 23) (G47 33)   19  Other nonspecific abnormal finding of lung field (793 19) (R91 8)   20  Pleural Effusion (511 9)   21  Prostate cancer (185) (C61)   22  RBBB (right bundle branch block) (426 4) (I45 10)   23  Screening for neurological condition (V80 09) (Z13 89)   24  Vertigo (780 4) (R42)    History of Present Illness   HPI: Mr Ed Stern is feeling short of breath  He has been more dyspneic than usually  for about 5 days  He was in the hospital for several days for bleeding ulcers that were clipped and injected   He is not coughing up any mucous  He denies any fever, chest pain but his legs are more swollen  Review of Systems   Complete-Male Pulm:      Constitutional: No fever or chills, feels well, no tiredness, no recent weight gain or weight loss  Eyes: no complaints of vision problems  ENT: no rhinitis, no PND, no epistaxis  Cardiovascular: no palpitations, no chest pain  Respiratory: as noted in HPI  Gastrointestinal: no complaints of esophageal reflux, no abdominal pain  Genitourinary: no urinary retention  Musculoskeletal: no arthralgias, no joint swelling, no myalgias  Integumentary: no rash, no lesions  Neurological: no headache, no fainting, no weakness  Psychiatric: no anxiety, no depression  Hematologic/Lymphatic: no complaints of swollen glands  ROS Reviewed:    ROS reviewed  Past Medical History   1  History of Acute UTI (599 0) (N39 0)   2  Chronic obstructive pulmonary disease (496) (J44 9)   3  History of Coronary Artery Disease (V12 59)   4  History of appendicitis (V12 79) (Z87 19)   5  History of back pain (V13 59) (Z87 39)   6  History of cancer (V10 90) (Z85 9)   7  History of chronic obstructive lung disease (V12 69) (Z87 09)   8  History of diabetes mellitus (V12 29) (Z86 39)   9  History of erectile dysfunction (V13 89) (Z87 438)   10  History of hematuria (V13 09) (Z87 448)   11  History of hemorrhoids (V13 89) (Z87 19)   12  History of hypertension (V12 59) (Z86 79)   13  History of measles (V12 09) (Z86 19)   14  History of mumps (V12 09) (Z86 19)   15  History of pneumonia (V12 61) (Z87 01)   16  History of varicella (V12 09) (Z86 19)   17  History of Prostate Cancer (V10 46)    Surgical History   1  History of Appendectomy   2  History of Diagnostic Cystoscopy   3  History of Prostate Surgery  Surgical History Reviewed: The surgical history was reviewed and updated today  Family History   Mother    1   Family history of cerebrovascular accident (CVA) (V17 1) (Z82 3)   2  Family history of hypertension (V17 49) (Z82 49)   3  Denied: Family history of mental disorder  Father    4  Family history of asthma (V17 5) (Z82 5)   5  Family history of lung disease (V19 8) (Z83 6)   6  Family history of malignant neoplasm (V16 9) (Z80 9)   7  Denied: Family history of mental disorder  Family History Reviewed: The family history was reviewed and updated today  Social History    · Caffeine use (V49 89) (F15 90)   · Exercises rarely (V49 89) (Z78 9)   · Former smoker (L78 21) (P40 389)   · Has Quit 3 weeks ago (March 2013)   Smoked for 56 years, two packs of cigarettes a        day   Gradually dropped down to a pack a day and before he quit, was down to a pack        of cigarettes a week  · Lives with family   ·    · Denied: History of Never a smoker   · Never uses seat belt   · No alcohol use   · No living will   · No Islam beliefs   · Denied: History of No secondhand smoke exposure   · Denied: History of Recreational drug use   · Retired  Social History Reviewed: The social history was reviewed and updated today  The social history was reviewed and is unchanged  Current Meds    1  Albuterol Sulfate (2 5 MG/3ML) 0 083% Inhalation Nebulization Solution; USE 1 UNIT     DOSE EVERY 4-6 HOURS AS NEEDED FOR WHEEZING ; Therapy: 08Apr2014 to (Last Rx:08Apr2014)  Requested for: 08Apr2014 Ordered   2  Albuterol Sulfate (2 5 MG/3ML) 0 083% Inhalation Nebulization Solution; USE 1 UNIT     DOSE EVERY 4-6 HOURS AS NEEDED FOR WHEEZING ; Therapy: 59PKP8697 to (Last Rx:21Mar2017)  Requested for: 21Mar2017 Ordered   3  Aspirin EC 81 MG Oral Tablet Delayed Release; take 1 tablet by mouth every day; Therapy: 66FCL0341 to Recorded   4  BD Insulin Syringe U-500 31G X 6MM 0 5 ML Miscellaneous; Use 3 per day as directed     with U-500 insulin;      Therapy: 51LEW7421 to (Evaluate:16Ivs2160)  Requested for: 55Ixt6552; Last Rx: 50QEB2075 Ordered   5  Budesonide 0 5 MG/2ML Inhalation Suspension; USE 1 UNIT DOSE VIA NEBULIZER     TWO TIMES A DAY; Therapy: 29Apr2016 to (Last Rx:29Apr2016)  Requested for: 29Apr2016 Ordered   6  Cyclobenzaprine HCl - 10 MG Oral Tablet; One Tablet twice daily  Requested for:     79ISL9287; Last Rx:19Jan2017 Ordered   7  Fish Oil CAPS; One capsule twice daily; Therapy: ([facility]) to Recorded   8  HumuLIN R U-500 (CONCENTRATED) 500 UNIT/ML Subcutaneous Solution; INJECT 55     UNITS SQ at 8 AM, 55 UNITS at 12 PM, 45     UNITS at 6PM     ****USE WITH U-500 SYRINGE ONLY***;     Therapy: 46DOE0432 to (Evaluate:67Lmo1608)  Requested for: 39Gcy9158; Last     Rx:87Ead1607 Ordered   9  Lisinopril 5 MG Oral Tablet; TAKE 1 TABLET DAILY; Last Rx:06Pis7136 Ordered   10  Metoprolol Tartrate 25 MG Oral Tablet; Take 1 tablet twice daily  Requested for:      58DSY3604; Last Rx:19Jan2017 Ordered   11  OneTouch Ultra Blue In [employer]; Check BG 4x per day; Therapy: 57WLY6829 to (Evaluate:21Piq1362)  Requested for: 30ABL7518; Last      Rx:19Jan2017 Ordered   12  Oxycodone-Acetaminophen 7 5-325 MG Oral Tablet; TAKE 1 TABLET EVERY 4 HOURS      AS NEEDED FOR BREAKTHROUGH PAIN;      Therapy: 34ABC2782 to (Evaluate:31Mar2017); Last Rx:21Mar2017 Ordered   13  Simvastatin 10 MG Oral Tablet; TAKE 1 TABLET DAILY; Therapy: 29Apr2016 to Recorded   14  Stiolto Respimat 2 5-2 5 MCG/ACT Inhalation Aerosol Solution; 2 puffs once a day; Therapy: 33ERW7164 to (Last Rx:21Mar2017)  Requested for: 21Mar2017 Ordered  Medication List Reviewed: The medication list was reviewed and updated today  Allergies   1   MetFORMIN HCl TABS    Vitals   Vital Signs    Recorded: 07PTK0896 01:59PM   Temperature 98 6 F   Heart Rate 82   Respiration 20   Systolic 869   Diastolic 62   Height 6 ft    Weight 275 lb 4 oz   BMI Calculated 37 33   BSA Calculated 2 44   O2 Saturation 96, Nasal Cannula   FiO2 3L/min, Nasal Cannula Physical Exam        Constitutional      General appearance: No acute distress, well appearing and well nourished  Ears, Nose, Mouth, and Throat      Nasal mucosa, septum, and turbinates: Normal without edema or erythema  Lips, teeth, and gums: Normal, good dentition  Oropharynx: Normal with no erythema, edema, exudate or lesions  Neck      Neck: Supple, symmetric, trachea midline, no masses  Jugular veins: Normal        Pulmonary      Auscultation of lungs: Clear to auscultation, no rales, no crackles, no wheezing  Cardiovascular      Auscultation of heart: Normal rate and rhythm, normal S1 and S2, no murmurs  Examination of extremities for edema and/or varicosities: Abnormal  -- +2 edema  Abdomen      Abdomen: Soft, non-tender  Lymphatic      Palpation of lymph nodes in neck: No lymphadenopathy  Musculoskeletal      Gait and station: Normal        Digits and nails: Normal without clubbing or cyanosis  Neurologic      Mental Status: Normal  Not confused, no evidence of dementia, good comprehension, good concentration  Skin      Skin and subcutaneous tissue: Limited exam shows no rash  Psychiatric      Orientation to person, place and time: Normal        Mood and affect: Normal        Future Appointments      Date/Time Provider Specialty Site   11/03/2017 11:30 AM ANUJ Jurado   Endocrinology [facility] ENDOCRINOLOGY   11/14/2017 09:30 AM Erasmo Soares, [address] Nephrology [facility] 21     Signatures    Electronically signed by : Steffanie Hennessy DO; Oct 19 2017  2:53PM EST                       (Author)

## 2017-10-23 ENCOUNTER — GENERIC CONVERSION - ENCOUNTER (OUTPATIENT)
Dept: OTHER | Facility: OTHER | Age: 72
End: 2017-10-23

## 2017-10-31 ENCOUNTER — APPOINTMENT (EMERGENCY)
Dept: RADIOLOGY | Facility: HOSPITAL | Age: 72
DRG: 191 | End: 2017-10-31
Payer: COMMERCIAL

## 2017-10-31 ENCOUNTER — HOSPITAL ENCOUNTER (INPATIENT)
Facility: HOSPITAL | Age: 72
LOS: 2 days | Discharge: HOME HEALTH | DRG: 191 | End: 2017-11-02
Attending: EMERGENCY MEDICINE | Admitting: HOSPITALIST
Payer: COMMERCIAL

## 2017-10-31 ENCOUNTER — GENERIC CONVERSION - ENCOUNTER (OUTPATIENT)
Dept: OTHER | Facility: OTHER | Age: 72
End: 2017-10-31

## 2017-10-31 DIAGNOSIS — I25.10 CORONARY ARTERY DISEASE INVOLVING NATIVE CORONARY ARTERY WITHOUT ANGINA PECTORIS: Chronic | ICD-10-CM

## 2017-10-31 DIAGNOSIS — R06.00 DYSPNEA: ICD-10-CM

## 2017-10-31 DIAGNOSIS — R07.9 CHEST PAIN: ICD-10-CM

## 2017-10-31 DIAGNOSIS — D72.829 LEUKOCYTOSIS: ICD-10-CM

## 2017-10-31 DIAGNOSIS — J90 PLEURAL EFFUSION: ICD-10-CM

## 2017-10-31 DIAGNOSIS — J96.01 ACUTE HYPOXEMIC RESPIRATORY FAILURE (HCC): Primary | ICD-10-CM

## 2017-10-31 DIAGNOSIS — R31.9 HEMATURIA: ICD-10-CM

## 2017-10-31 DIAGNOSIS — M54.9 BACK PAIN: ICD-10-CM

## 2017-10-31 DIAGNOSIS — R33.9 URINARY RETENTION: ICD-10-CM

## 2017-10-31 PROBLEM — I10 ACCELERATED HYPERTENSION: Status: ACTIVE | Noted: 2017-10-31

## 2017-10-31 PROBLEM — J96.11 CHRONIC RESPIRATORY FAILURE WITH HYPOXIA (HCC): Chronic | Status: ACTIVE | Noted: 2017-07-23

## 2017-10-31 PROBLEM — J44.9 COPD (CHRONIC OBSTRUCTIVE PULMONARY DISEASE) (HCC): Chronic | Status: ACTIVE | Noted: 2017-07-23

## 2017-10-31 PROBLEM — R06.02 SOB (SHORTNESS OF BREATH): Status: ACTIVE | Noted: 2017-10-31

## 2017-10-31 LAB
ALBUMIN SERPL BCP-MCNC: 2.9 G/DL (ref 3.5–5)
ALP SERPL-CCNC: 84 U/L (ref 46–116)
ALT SERPL W P-5'-P-CCNC: 30 U/L (ref 12–78)
ANION GAP SERPL CALCULATED.3IONS-SCNC: 4 MMOL/L (ref 4–13)
AST SERPL W P-5'-P-CCNC: 34 U/L (ref 5–45)
BASOPHILS # BLD AUTO: 0.02 THOUSANDS/ΜL (ref 0–0.1)
BASOPHILS NFR BLD AUTO: 0 % (ref 0–1)
BILIRUB SERPL-MCNC: 0.19 MG/DL (ref 0.2–1)
BUN SERPL-MCNC: 17 MG/DL (ref 5–25)
CALCIUM SERPL-MCNC: 8.5 MG/DL (ref 8.3–10.1)
CHLORIDE SERPL-SCNC: 102 MMOL/L (ref 100–108)
CO2 SERPL-SCNC: 33 MMOL/L (ref 21–32)
CREAT SERPL-MCNC: 1.15 MG/DL (ref 0.6–1.3)
DEPRECATED D DIMER PPP: 382 NG/ML (FEU) (ref 0–424)
EOSINOPHIL # BLD AUTO: 0.25 THOUSAND/ΜL (ref 0–0.61)
EOSINOPHIL NFR BLD AUTO: 2 % (ref 0–6)
ERYTHROCYTE [DISTWIDTH] IN BLOOD BY AUTOMATED COUNT: 20 % (ref 11.6–15.1)
GFR SERPL CREATININE-BSD FRML MDRD: 64 ML/MIN/1.73SQ M
GLUCOSE SERPL-MCNC: 73 MG/DL (ref 65–140)
HCT VFR BLD AUTO: 37 % (ref 36.5–49.3)
HGB BLD-MCNC: 10.8 G/DL (ref 12–17)
LYMPHOCYTES # BLD AUTO: 1.02 THOUSANDS/ΜL (ref 0.6–4.47)
LYMPHOCYTES NFR BLD AUTO: 9 % (ref 14–44)
MCH RBC QN AUTO: 23.2 PG (ref 26.8–34.3)
MCHC RBC AUTO-ENTMCNC: 29.2 G/DL (ref 31.4–37.4)
MCV RBC AUTO: 80 FL (ref 82–98)
MONOCYTES # BLD AUTO: 0.84 THOUSAND/ΜL (ref 0.17–1.22)
MONOCYTES NFR BLD AUTO: 7 % (ref 4–12)
NEUTROPHILS # BLD AUTO: 9.48 THOUSANDS/ΜL (ref 1.85–7.62)
NEUTS SEG NFR BLD AUTO: 82 % (ref 43–75)
NRBC BLD AUTO-RTO: 0 /100 WBCS
NT-PROBNP SERPL-MCNC: 334 PG/ML
PLATELET # BLD AUTO: 221 THOUSANDS/UL (ref 149–390)
PMV BLD AUTO: 9.9 FL (ref 8.9–12.7)
POTASSIUM SERPL-SCNC: 4.3 MMOL/L (ref 3.5–5.3)
PROT SERPL-MCNC: 7.9 G/DL (ref 6.4–8.2)
RBC # BLD AUTO: 4.65 MILLION/UL (ref 3.88–5.62)
SODIUM SERPL-SCNC: 139 MMOL/L (ref 136–145)
SPECIMEN SOURCE: NORMAL
TROPONIN I BLD-MCNC: 0 NG/ML (ref 0–0.08)
WBC # BLD AUTO: 11.65 THOUSAND/UL (ref 4.31–10.16)

## 2017-10-31 PROCEDURE — 83880 ASSAY OF NATRIURETIC PEPTIDE: CPT | Performed by: EMERGENCY MEDICINE

## 2017-10-31 PROCEDURE — 94660 CPAP INITIATION&MGMT: CPT

## 2017-10-31 PROCEDURE — 84484 ASSAY OF TROPONIN QUANT: CPT

## 2017-10-31 PROCEDURE — 36415 COLL VENOUS BLD VENIPUNCTURE: CPT

## 2017-10-31 PROCEDURE — 71010 HB CHEST X-RAY 1 VIEW FRONTAL (PORTABLE): CPT

## 2017-10-31 PROCEDURE — 85025 COMPLETE CBC W/AUTO DIFF WBC: CPT | Performed by: EMERGENCY MEDICINE

## 2017-10-31 PROCEDURE — 93005 ELECTROCARDIOGRAM TRACING: CPT | Performed by: EMERGENCY MEDICINE

## 2017-10-31 PROCEDURE — 94644 CONT INHLJ TX 1ST HOUR: CPT

## 2017-10-31 PROCEDURE — 99285 EMERGENCY DEPT VISIT HI MDM: CPT

## 2017-10-31 PROCEDURE — 80053 COMPREHEN METABOLIC PANEL: CPT | Performed by: EMERGENCY MEDICINE

## 2017-10-31 PROCEDURE — 94640 AIRWAY INHALATION TREATMENT: CPT

## 2017-10-31 PROCEDURE — 94760 N-INVAS EAR/PLS OXIMETRY 1: CPT

## 2017-10-31 PROCEDURE — 85379 FIBRIN DEGRADATION QUANT: CPT | Performed by: EMERGENCY MEDICINE

## 2017-10-31 PROCEDURE — 36415 COLL VENOUS BLD VENIPUNCTURE: CPT | Performed by: EMERGENCY MEDICINE

## 2017-10-31 RX ORDER — ASPIRIN 81 MG/1
81 TABLET ORAL DAILY
COMMUNITY
End: 2018-03-15 | Stop reason: SDUPTHER

## 2017-10-31 RX ORDER — ASPIRIN 81 MG/1
324 TABLET, CHEWABLE ORAL ONCE
Status: COMPLETED | OUTPATIENT
Start: 2017-10-31 | End: 2017-10-31

## 2017-10-31 RX ORDER — ALBUTEROL SULFATE 2.5 MG/3ML
SOLUTION RESPIRATORY (INHALATION)
Status: COMPLETED
Start: 2017-10-31 | End: 2017-10-31

## 2017-10-31 RX ORDER — FUROSEMIDE 10 MG/ML
40 INJECTION INTRAMUSCULAR; INTRAVENOUS ONCE
Status: COMPLETED | OUTPATIENT
Start: 2017-10-31 | End: 2017-10-31

## 2017-10-31 RX ORDER — NITROGLYCERIN 0.4 MG/1
0.4 TABLET SUBLINGUAL ONCE
Status: COMPLETED | OUTPATIENT
Start: 2017-10-31 | End: 2017-10-31

## 2017-10-31 RX ORDER — BUDESONIDE AND FORMOTEROL FUMARATE DIHYDRATE 160; 4.5 UG/1; UG/1
2 AEROSOL RESPIRATORY (INHALATION) 2 TIMES DAILY
COMMUNITY
End: 2019-08-15 | Stop reason: ALTCHOICE

## 2017-10-31 RX ORDER — SODIUM CHLORIDE FOR INHALATION 0.9 %
VIAL, NEBULIZER (ML) INHALATION
Status: COMPLETED
Start: 2017-10-31 | End: 2017-10-31

## 2017-10-31 RX ORDER — FUROSEMIDE 10 MG/ML
40 INJECTION INTRAMUSCULAR; INTRAVENOUS DAILY
Status: DISCONTINUED | OUTPATIENT
Start: 2017-11-01 | End: 2017-11-02 | Stop reason: HOSPADM

## 2017-10-31 RX ORDER — FERROUS SULFATE 325(65) MG
325 TABLET ORAL
COMMUNITY
End: 2021-07-25 | Stop reason: HOSPADM

## 2017-10-31 RX ADMIN — NITROGLYCERIN 0.4 MG: 0.4 TABLET SUBLINGUAL at 20:25

## 2017-10-31 RX ADMIN — IPRATROPIUM BROMIDE 1 MG: 0.5 SOLUTION RESPIRATORY (INHALATION) at 21:18

## 2017-10-31 RX ADMIN — ALBUTEROL SULFATE 5 MG: 2.5 SOLUTION RESPIRATORY (INHALATION) at 20:30

## 2017-10-31 RX ADMIN — ASPIRIN 81 MG 324 MG: 81 TABLET ORAL at 20:24

## 2017-10-31 RX ADMIN — ISODIUM CHLORIDE 9 ML: 0.03 SOLUTION RESPIRATORY (INHALATION) at 21:18

## 2017-10-31 RX ADMIN — FUROSEMIDE 40 MG: 10 INJECTION, SOLUTION INTRAMUSCULAR; INTRAVENOUS at 23:09

## 2017-10-31 RX ADMIN — ALBUTEROL SULFATE 10 MG: 2.5 SOLUTION RESPIRATORY (INHALATION) at 21:18

## 2017-11-01 ENCOUNTER — APPOINTMENT (INPATIENT)
Dept: RADIOLOGY | Facility: HOSPITAL | Age: 72
DRG: 191 | End: 2017-11-01
Payer: COMMERCIAL

## 2017-11-01 LAB
ANION GAP SERPL CALCULATED.3IONS-SCNC: 5 MMOL/L (ref 4–13)
ATRIAL RATE: 105 BPM
BUN SERPL-MCNC: 18 MG/DL (ref 5–25)
CALCIUM SERPL-MCNC: 8.3 MG/DL (ref 8.3–10.1)
CHLORIDE SERPL-SCNC: 99 MMOL/L (ref 100–108)
CO2 SERPL-SCNC: 34 MMOL/L (ref 21–32)
CREAT SERPL-MCNC: 1.23 MG/DL (ref 0.6–1.3)
ERYTHROCYTE [DISTWIDTH] IN BLOOD BY AUTOMATED COUNT: 20.1 % (ref 11.6–15.1)
EST. AVERAGE GLUCOSE BLD GHB EST-MCNC: 148 MG/DL
GFR SERPL CREATININE-BSD FRML MDRD: 59 ML/MIN/1.73SQ M
GLUCOSE SERPL-MCNC: 125 MG/DL (ref 65–140)
GLUCOSE SERPL-MCNC: 146 MG/DL (ref 65–140)
GLUCOSE SERPL-MCNC: 274 MG/DL (ref 65–140)
GLUCOSE SERPL-MCNC: 299 MG/DL (ref 65–140)
GLUCOSE SERPL-MCNC: 309 MG/DL (ref 65–140)
GLUCOSE SERPL-MCNC: 331 MG/DL (ref 65–140)
GLUCOSE SERPL-MCNC: 372 MG/DL (ref 65–140)
GLUCOSE SERPL-MCNC: 50 MG/DL (ref 65–140)
HBA1C MFR BLD: 6.8 % (ref 4.2–6.3)
HCT VFR BLD AUTO: 34.1 % (ref 36.5–49.3)
HGB BLD-MCNC: 9.7 G/DL (ref 12–17)
MCH RBC QN AUTO: 23 PG (ref 26.8–34.3)
MCHC RBC AUTO-ENTMCNC: 28.4 G/DL (ref 31.4–37.4)
MCV RBC AUTO: 81 FL (ref 82–98)
P AXIS: 91 DEGREES
PLATELET # BLD AUTO: 182 THOUSANDS/UL (ref 149–390)
PLATELET # BLD AUTO: 182 THOUSANDS/UL (ref 149–390)
PMV BLD AUTO: 9.5 FL (ref 8.9–12.7)
PMV BLD AUTO: 9.7 FL (ref 8.9–12.7)
POTASSIUM SERPL-SCNC: 3.8 MMOL/L (ref 3.5–5.3)
PR INTERVAL: 152 MS
QRS AXIS: 104 DEGREES
QRSD INTERVAL: 118 MS
QT INTERVAL: 368 MS
QTC INTERVAL: 486 MS
RBC # BLD AUTO: 4.22 MILLION/UL (ref 3.88–5.62)
SODIUM SERPL-SCNC: 138 MMOL/L (ref 136–145)
T WAVE AXIS: 56 DEGREES
TROPONIN I SERPL-MCNC: <0.02 NG/ML
TROPONIN I SERPL-MCNC: <0.02 NG/ML
VENTRICULAR RATE: 105 BPM
WBC # BLD AUTO: 10.08 THOUSAND/UL (ref 4.31–10.16)

## 2017-11-01 PROCEDURE — G8989 SELF CARE D/C STATUS: HCPCS

## 2017-11-01 PROCEDURE — 82948 REAGENT STRIP/BLOOD GLUCOSE: CPT

## 2017-11-01 PROCEDURE — G8988 SELF CARE GOAL STATUS: HCPCS

## 2017-11-01 PROCEDURE — 83036 HEMOGLOBIN GLYCOSYLATED A1C: CPT | Performed by: HOSPITALIST

## 2017-11-01 PROCEDURE — 84484 ASSAY OF TROPONIN QUANT: CPT | Performed by: HOSPITALIST

## 2017-11-01 PROCEDURE — 85027 COMPLETE CBC AUTOMATED: CPT | Performed by: HOSPITALIST

## 2017-11-01 PROCEDURE — 97165 OT EVAL LOW COMPLEX 30 MIN: CPT

## 2017-11-01 PROCEDURE — 94760 N-INVAS EAR/PLS OXIMETRY 1: CPT

## 2017-11-01 PROCEDURE — G8978 MOBILITY CURRENT STATUS: HCPCS | Performed by: PHYSICAL THERAPIST

## 2017-11-01 PROCEDURE — 71275 CT ANGIOGRAPHY CHEST: CPT

## 2017-11-01 PROCEDURE — 85049 AUTOMATED PLATELET COUNT: CPT | Performed by: HOSPITALIST

## 2017-11-01 PROCEDURE — G8987 SELF CARE CURRENT STATUS: HCPCS

## 2017-11-01 PROCEDURE — 97163 PT EVAL HIGH COMPLEX 45 MIN: CPT | Performed by: PHYSICAL THERAPIST

## 2017-11-01 PROCEDURE — 80048 BASIC METABOLIC PNL TOTAL CA: CPT | Performed by: HOSPITALIST

## 2017-11-01 PROCEDURE — 94640 AIRWAY INHALATION TREATMENT: CPT

## 2017-11-01 PROCEDURE — G8979 MOBILITY GOAL STATUS: HCPCS | Performed by: PHYSICAL THERAPIST

## 2017-11-01 RX ORDER — ACETAMINOPHEN 325 MG/1
650 TABLET ORAL EVERY 6 HOURS PRN
Status: DISCONTINUED | OUTPATIENT
Start: 2017-11-01 | End: 2017-11-02 | Stop reason: HOSPADM

## 2017-11-01 RX ORDER — PRAVASTATIN SODIUM 80 MG/1
80 TABLET ORAL
Status: DISCONTINUED | OUTPATIENT
Start: 2017-11-01 | End: 2017-11-02 | Stop reason: HOSPADM

## 2017-11-01 RX ORDER — DOCUSATE SODIUM 100 MG/1
100 CAPSULE, LIQUID FILLED ORAL 2 TIMES DAILY
Status: DISCONTINUED | OUTPATIENT
Start: 2017-11-01 | End: 2017-11-02 | Stop reason: HOSPADM

## 2017-11-01 RX ORDER — PANTOPRAZOLE SODIUM 40 MG/1
40 TABLET, DELAYED RELEASE ORAL
Status: DISCONTINUED | OUTPATIENT
Start: 2017-11-01 | End: 2017-11-02 | Stop reason: HOSPADM

## 2017-11-01 RX ORDER — POTASSIUM CHLORIDE 750 MG/1
10 TABLET, EXTENDED RELEASE ORAL DAILY
Status: DISCONTINUED | OUTPATIENT
Start: 2017-11-01 | End: 2017-11-02 | Stop reason: HOSPADM

## 2017-11-01 RX ORDER — BUDESONIDE AND FORMOTEROL FUMARATE DIHYDRATE 160; 4.5 UG/1; UG/1
2 AEROSOL RESPIRATORY (INHALATION) 2 TIMES DAILY
Status: DISCONTINUED | OUTPATIENT
Start: 2017-11-01 | End: 2017-11-02 | Stop reason: HOSPADM

## 2017-11-01 RX ORDER — GABAPENTIN 300 MG/1
300 CAPSULE ORAL
Status: DISCONTINUED | OUTPATIENT
Start: 2017-11-01 | End: 2017-11-02 | Stop reason: HOSPADM

## 2017-11-01 RX ORDER — SODIUM CHLORIDE FOR INHALATION 0.9 %
3 VIAL, NEBULIZER (ML) INHALATION
Status: DISCONTINUED | OUTPATIENT
Start: 2017-11-01 | End: 2017-11-01

## 2017-11-01 RX ORDER — ONDANSETRON 2 MG/ML
4 INJECTION INTRAMUSCULAR; INTRAVENOUS EVERY 6 HOURS PRN
Status: DISCONTINUED | OUTPATIENT
Start: 2017-11-01 | End: 2017-11-02 | Stop reason: HOSPADM

## 2017-11-01 RX ORDER — FERROUS SULFATE 325(65) MG
325 TABLET ORAL
Status: DISCONTINUED | OUTPATIENT
Start: 2017-11-01 | End: 2017-11-02 | Stop reason: HOSPADM

## 2017-11-01 RX ORDER — ASPIRIN 81 MG/1
81 TABLET ORAL DAILY
Status: DISCONTINUED | OUTPATIENT
Start: 2017-11-01 | End: 2017-11-02 | Stop reason: HOSPADM

## 2017-11-01 RX ORDER — SENNOSIDES 8.6 MG
1 TABLET ORAL DAILY
Status: DISCONTINUED | OUTPATIENT
Start: 2017-11-01 | End: 2017-11-02 | Stop reason: HOSPADM

## 2017-11-01 RX ORDER — OXYCODONE HYDROCHLORIDE AND ACETAMINOPHEN 5; 325 MG/1; MG/1
1.5 TABLET ORAL EVERY 6 HOURS PRN
Status: DISCONTINUED | OUTPATIENT
Start: 2017-11-01 | End: 2017-11-02 | Stop reason: HOSPADM

## 2017-11-01 RX ORDER — LEVALBUTEROL 1.25 MG/.5ML
1.25 SOLUTION, CONCENTRATE RESPIRATORY (INHALATION)
Status: DISCONTINUED | OUTPATIENT
Start: 2017-11-01 | End: 2017-11-02

## 2017-11-01 RX ORDER — LEVALBUTEROL 1.25 MG/.5ML
SOLUTION, CONCENTRATE RESPIRATORY (INHALATION)
Status: COMPLETED
Start: 2017-11-01 | End: 2017-11-01

## 2017-11-01 RX ORDER — ALBUTEROL SULFATE 2.5 MG/3ML
2.5 SOLUTION RESPIRATORY (INHALATION) EVERY 4 HOURS PRN
Status: DISCONTINUED | OUTPATIENT
Start: 2017-11-01 | End: 2017-11-02 | Stop reason: HOSPADM

## 2017-11-01 RX ORDER — CYCLOBENZAPRINE HCL 10 MG
10 TABLET ORAL
Status: DISCONTINUED | OUTPATIENT
Start: 2017-11-01 | End: 2017-11-02 | Stop reason: HOSPADM

## 2017-11-01 RX ADMIN — BUDESONIDE AND FORMOTEROL FUMARATE DIHYDRATE 2 PUFF: 160; 4.5 AEROSOL RESPIRATORY (INHALATION) at 18:42

## 2017-11-01 RX ADMIN — IPRATROPIUM BROMIDE 0.5 MG: 0.5 SOLUTION RESPIRATORY (INHALATION) at 07:13

## 2017-11-01 RX ADMIN — OXYCODONE HYDROCHLORIDE AND ACETAMINOPHEN 1.5 TABLET: 5; 325 TABLET ORAL at 21:51

## 2017-11-01 RX ADMIN — PRAVASTATIN SODIUM 80 MG: 80 TABLET ORAL at 18:43

## 2017-11-01 RX ADMIN — INSULIN LISPRO 6 UNITS: 100 INJECTION, SOLUTION INTRAVENOUS; SUBCUTANEOUS at 21:52

## 2017-11-01 RX ADMIN — METOPROLOL TARTRATE 25 MG: 25 TABLET ORAL at 18:43

## 2017-11-01 RX ADMIN — LEVALBUTEROL HYDROCHLORIDE 1.25 MG: 1.25 SOLUTION, CONCENTRATE RESPIRATORY (INHALATION) at 02:06

## 2017-11-01 RX ADMIN — INSULIN LISPRO 6 UNITS: 100 INJECTION, SOLUTION INTRAVENOUS; SUBCUTANEOUS at 11:55

## 2017-11-01 RX ADMIN — PANTOPRAZOLE SODIUM 40 MG: 40 TABLET, DELAYED RELEASE ORAL at 06:44

## 2017-11-01 RX ADMIN — ASPIRIN 81 MG: 81 TABLET, COATED ORAL at 09:35

## 2017-11-01 RX ADMIN — GABAPENTIN 300 MG: 300 CAPSULE ORAL at 21:51

## 2017-11-01 RX ADMIN — DOCUSATE SODIUM 100 MG: 100 CAPSULE, LIQUID FILLED ORAL at 09:36

## 2017-11-01 RX ADMIN — FUROSEMIDE 40 MG: 10 INJECTION, SOLUTION INTRAMUSCULAR; INTRAVENOUS at 09:36

## 2017-11-01 RX ADMIN — INSULIN LISPRO 8 UNITS: 100 INJECTION, SOLUTION INTRAVENOUS; SUBCUTANEOUS at 18:42

## 2017-11-01 RX ADMIN — IPRATROPIUM BROMIDE 0.5 MG: 0.5 SOLUTION RESPIRATORY (INHALATION) at 19:44

## 2017-11-01 RX ADMIN — IPRATROPIUM BROMIDE 0.5 MG: 0.5 SOLUTION RESPIRATORY (INHALATION) at 13:13

## 2017-11-01 RX ADMIN — IPRATROPIUM BROMIDE 0.5 MG: 0.5 SOLUTION RESPIRATORY (INHALATION) at 02:05

## 2017-11-01 RX ADMIN — LEVALBUTEROL HYDROCHLORIDE 1.25 MG: 1.25 SOLUTION, CONCENTRATE RESPIRATORY (INHALATION) at 13:13

## 2017-11-01 RX ADMIN — BUDESONIDE AND FORMOTEROL FUMARATE DIHYDRATE 2 PUFF: 160; 4.5 AEROSOL RESPIRATORY (INHALATION) at 09:36

## 2017-11-01 RX ADMIN — Medication 325 MG: at 06:45

## 2017-11-01 RX ADMIN — LEVALBUTEROL HYDROCHLORIDE 1.25 MG: 1.25 SOLUTION, CONCENTRATE RESPIRATORY (INHALATION) at 19:44

## 2017-11-01 RX ADMIN — LEVALBUTEROL HYDROCHLORIDE 1.25 MG: 1.25 SOLUTION, CONCENTRATE RESPIRATORY (INHALATION) at 07:13

## 2017-11-01 RX ADMIN — PANTOPRAZOLE SODIUM 40 MG: 40 TABLET, DELAYED RELEASE ORAL at 18:43

## 2017-11-01 RX ADMIN — SENNOSIDES 8.6 MG: 8.6 TABLET, FILM COATED ORAL at 09:35

## 2017-11-01 RX ADMIN — GABAPENTIN 300 MG: 300 CAPSULE ORAL at 01:00

## 2017-11-01 RX ADMIN — POTASSIUM CHLORIDE 10 MEQ: 750 TABLET, EXTENDED RELEASE ORAL at 09:35

## 2017-11-01 RX ADMIN — OXYCODONE HYDROCHLORIDE AND ACETAMINOPHEN 1.5 TABLET: 5; 325 TABLET ORAL at 01:00

## 2017-11-01 RX ADMIN — ENOXAPARIN SODIUM 40 MG: 40 INJECTION SUBCUTANEOUS at 09:36

## 2017-11-01 RX ADMIN — OXYCODONE HYDROCHLORIDE AND ACETAMINOPHEN 1.5 TABLET: 5; 325 TABLET ORAL at 07:25

## 2017-11-01 RX ADMIN — METOPROLOL TARTRATE 25 MG: 25 TABLET ORAL at 09:35

## 2017-11-01 RX ADMIN — CYCLOBENZAPRINE HYDROCHLORIDE 10 MG: 10 TABLET, FILM COATED ORAL at 01:00

## 2017-11-01 RX ADMIN — IOHEXOL 85 ML: 350 INJECTION, SOLUTION INTRAVENOUS at 13:24

## 2017-11-01 RX ADMIN — DOCUSATE SODIUM 100 MG: 100 CAPSULE, LIQUID FILLED ORAL at 18:43

## 2017-11-01 RX ADMIN — CYCLOBENZAPRINE HYDROCHLORIDE 10 MG: 10 TABLET, FILM COATED ORAL at 21:51

## 2017-11-01 NOTE — ED ATTENDING ATTESTATION
Camacho Noonan MD, saw and evaluated the patient  I have discussed the patient with the resident/non-physician practitioner and agree with the resident's/non-physician practitioner's findings, Plan of Care, and MDM as documented in the resident's/non-physician practitioner's note, except where noted  All available labs and Radiology studies were reviewed  At this point I agree with the current assessment done in the Emergency Department  I have conducted an independent evaluation of this patient a history and physical is as follows: This is a 17-year-old male who presents to the emergency department with chest pain and shortness of breath  The patient has a history of CHF, COPD, is oxygen-dependent, and also has a history of active prostate cancer  The patient has been admitted in the last 1 month related to CHF  He is noncompliant  The patient comes in with ongoing shortness of breath, but also developed chest pressure tonight  He states that the pain is different than the pain that he had when he required his stents  The patient has not had fevers  He does not have productive cough  His pain is not pleuritic  It feels like pressure  The patient denies abdominal pain, diaphoresis, nausea, or vomiting  He has not had bowel symptoms  His review of systems otherwise negative in 12 systems were reviewed  On exam the patient is ill-appearing  He is tachypneic with increased work of breathing  He is sitting on the edge of the bed and try potting  The patient had a neck exam are normal   Trachea is midline  His oropharynx is clear  I cannot appreciate JVD  The patient has poor air movement throughout, and is not moving any air  His heart is regular and tachycardic with no murmurs appreciated  His abdomen is obese and nontender with no masses, rebound, or guarding  His extremities have +2 edema bilaterally    The patient is neurologically nonfocal with a normal back exam in no rashes or skin changes  Impression:  Respiratory distress, differential is broad and includes PE, CHF, or COPD  The patient's EKG was reviewed by me and shows right bundle branch block and tachycardia, which is essentially unchanged from his prior EKG  Will plan to place patient on BiPAP to support his work of breathing  Will get a chest x-ray to rule out pneumothorax, will check D-dimer, as patient is low to moderate risk for pulmonary embolus    Will do cardiac evaluation, admit to hospital   Patient required 40 minutes of bedside critical care time outside of separately billable procedures    Critical Care Time  CritCare Time

## 2017-11-01 NOTE — ED PROVIDER NOTES
History  Chief Complaint   Patient presents with    Chest Pain     pt reports being short of breath on exertion for "quite some time" reports chest pain started three days ago    Shortness of Breath     74-year-old male with history of congestive heart failure, COPD, coronary artery disease, diabetes and recent hospitalization 3 weeks ago presents for evaluation of shortness of breath and chest pain  He reports he never returned to his baseline respiratory status after discharge but over the past 10 days has had worsening shortness of breath  Exacerbated by exertion but however continues well arrest   Has had to go to 3 L of supplemental oxygen  He reports an occasional nonproductive cough  Today he developed chest pain in the central, pressure, 3/10 and constant  It is not exacerbated by activity or alleviated by rest   Has not tried any medications  Nothing sublingual nitro  No fevers, chills, nausea, vomiting, diarrhea or constipation  Tolerating a regular diet without difficulty  He has not been compliant with his Lasix medication, he does report worsening shortness of breath while lying supine  He has been sleeping on 2 pillows  He has never had a blood clot, but he does have active prostate cancer  Prior to Admission Medications   Prescriptions Last Dose Informant Patient Reported? Taking?   acetaminophen (TYLENOL) 325 mg tablet Unknown at Unknown time  No No   Sig: Take 3 tablets by mouth every 8 (eight) hours   albuterol (2 5 mg/3 mL) 0 083 % nebulizer solution 11/1/2017 at Unknown time  Yes Yes   Sig: Take 2 5 mg by nebulization Indications: 2-3 times a day PRN     aspirin (ECOTRIN LOW STRENGTH) 81 mg EC tablet 10/31/2017 at Unknown time  Yes Yes   Sig: Take 81 mg by mouth daily   budesonide-formoterol (SYMBICORT) 160-4 5 mcg/act inhaler 10/31/2017 at Unknown time  Yes Yes   Sig: Inhale 2 puffs 2 (two) times a day   cyclobenzaprine (FLEXERIL) 10 mg tablet 10/31/2017 at Unknown time  No Yes   Sig: Take 1 tablet by mouth daily at bedtime   ferrous sulfate 325 (65 Fe) mg tablet 10/31/2017 at Unknown time  Yes Yes   Sig: Take 325 mg by mouth daily with breakfast   furosemide (LASIX) 40 mg tablet 10/31/2017 at Unknown time  No Yes   Sig: Take 1 tablet by mouth daily   gabapentin (NEURONTIN) 300 mg capsule Past Week at Unknown time  Yes Yes   Sig: Take 300 mg by mouth daily at bedtime     insulin regular (HumuLIN R U-500) 500 units/mL CONCENTRATED injection 10/31/2017 at Unknown time  No Yes   Sig: Inject 0 11 mL under the skin daily before breakfast   insulin regular (HumuLIN R U-500) 500 units/mL CONCENTRATED injection 10/31/2017 at Unknown time  No Yes   Sig: Inject 0 07 mL under the skin daily before dinner   metoprolol tartrate (LOPRESSOR) 25 mg tablet 10/31/2017 at Unknown time  Yes Yes   Sig: Take 25 mg by mouth 2 (two) times a day     oxyCODONE-acetaminophen (PERCOCET) 7 5-325 MG per tablet 10/31/2017 at Unknown time  Yes Yes   Sig: Take 1 tablet by mouth 2 (two) times a day  pantoprazole (PROTONIX) 40 mg tablet 10/31/2017 at Unknown time  No Yes   Sig: Take 1 tablet by mouth 2 (two) times a day before meals   potassium chloride (K-DUR,KLOR-CON) 10 mEq tablet 10/31/2017 at Unknown time  No Yes   Sig: Take 1 tablet by mouth daily   simvastatin (ZOCOR) 40 mg tablet 10/31/2017 at Unknown time  Yes Yes   Sig: Take 40 mg by mouth daily at bedtime     tiotropium (SPIRIVA) 18 mcg inhalation capsule 10/31/2017 at Unknown time  Yes Yes   Sig: Place 18 mcg into inhaler and inhale daily      Facility-Administered Medications: None       Past Medical History:   Diagnosis Date    Abdominal pain     Cardiac disease     [facility] [address] [facility]     Coronary artery disease     Diabetes mellitus ([facility] [address] [facility]     Hyperlipidemia     Hypertension     MI (myocardial infarction)     with 3 stents    Prostate cancer [facility])        Past Surgical History:   Procedure Laterality Date    ABDOMINAL SURGERY      exploratory    ANGIOPLASTY      3 stents    ESOPHAGOGASTRODUODENOSCOPY N/A 10/2/2017    Procedure: ESOPHAGOGASTRODUODENOSCOPY (EGD); Surgeon: Arielle De La Rosa MD;  Location: [facility]; Service: Gastroenterology    PROSTATE SURGERY         Family History   Problem Relation Age of Onset    Heart disease Father      I have reviewed and agree with the history as documented  Social History   Substance Use Topics    Smoking status: Former Smoker     Packs/day: 1 50     Years: 50 00     Quit date: 9/13/2017    Smokeless tobacco: Never Used    Alcohol use No        Review of Systems   Constitutional: Negative for chills, fatigue and fever  HENT: Negative for congestion and sore throat  Eyes: Negative for visual disturbance  Respiratory: Positive for cough, chest tightness and shortness of breath  Negative for wheezing  Cardiovascular: Positive for chest pain  Negative for palpitations and leg swelling  Gastrointestinal: Negative for abdominal distention, abdominal pain, blood in stool, constipation, diarrhea, nausea and vomiting  Genitourinary: Negative for difficulty urinating, dysuria, flank pain and hematuria  Musculoskeletal: Negative for back pain and gait problem  Skin: Negative for rash  Allergic/Immunologic: Negative for immunocompromised state  Neurological: Negative for dizziness, syncope, weakness, light-headedness, numbness and headaches  Hematological: Negative for adenopathy  Psychiatric/Behavioral: Negative for sleep disturbance         Physical Exam  ED Triage Vitals   Temperature Pulse Respirations Blood Pressure SpO2   10/31/17 1951 10/31/17 1951 10/31/17 1951 10/31/17 1951 10/31/17 1951   97 7 °F (36 5 °C) (!) 109 (!) 28 (!) 192/83 95 %      Temp Source Heart Rate Source Patient Position - Orthostatic VS BP Location FiO2 (%)   10/31/17 1951 10/31/17 2051 10/31/17 2051 10/31/17 2051 --   Tympanic Monitor Sitting Right arm       Pain Score 10/31/17 2109       5           Orthostatic Vital Signs  Vitals:    10/31/17 2230 10/31/17 2312 11/01/17 0006 11/01/17 0208   BP: 127/57  144/60    Pulse: 96 94 99 90   Patient Position - Orthostatic VS:  Sitting Sitting        Physical Exam   Constitutional: He is oriented to person, place, and time  Vital signs are normal  He appears well-developed and well-nourished  He does not appear ill  No distress  Nasal cannula in place  HENT:   Head: Normocephalic and atraumatic  Right Ear: Tympanic membrane normal    Left Ear: Tympanic membrane normal    Nose: Nose normal  Right sinus exhibits no maxillary sinus tenderness and no frontal sinus tenderness  Left sinus exhibits no maxillary sinus tenderness and no frontal sinus tenderness  Mouth/Throat: Uvula is midline, oropharynx is clear and moist and mucous membranes are normal  No oropharyngeal exudate, posterior oropharyngeal edema, posterior oropharyngeal erythema or tonsillar abscesses  Eyes: Conjunctivae and EOM are normal  Pupils are equal, round, and reactive to light  Neck: Trachea normal, normal range of motion, full passive range of motion without pain and phonation normal  Neck supple  No JVD present  Cardiovascular: Normal rate, regular rhythm and intact distal pulses  No murmur heard  Pulmonary/Chest: Effort normal  No accessory muscle usage  Tachypnea noted  No respiratory distress  He has decreased breath sounds  He has no wheezes  He has no rhonchi  He has no rales  He exhibits no tenderness, no crepitus and no edema  Abdominal: Soft  Normal appearance and bowel sounds are normal  He exhibits no distension  There is no tenderness  There is no rigidity, no rebound, no guarding and no CVA tenderness  Musculoskeletal: Normal range of motion  Trace bilateral pitting edema  Lymphadenopathy:     He has no cervical adenopathy  Neurological: He is alert and oriented to person, place, and time  He has normal strength   No cranial nerve deficit or sensory deficit  GCS eye subscore is 4  GCS verbal subscore is 5  GCS motor subscore is 6  Skin: Skin is warm and dry  Capillary refill takes less than 2 seconds  No rash noted  He is not diaphoretic  Psychiatric: He has a normal mood and affect  Nursing note and vitals reviewed        ED Medications  Medications   albuterol inhalation solution 2 5 mg (not administered)   aspirin (ECOTRIN LOW STRENGTH) EC tablet 81 mg (not administered)   budesonide-formoterol (SYMBICORT) 160-4 5 mcg/act inhaler 2 puff (not administered)   cyclobenzaprine (FLEXERIL) tablet 10 mg (10 mg Oral Given 11/1/17 0100)   ferrous sulfate tablet 325 mg (not administered)   gabapentin (NEURONTIN) capsule 300 mg (300 mg Oral Given 11/1/17 0100)   metoprolol tartrate (LOPRESSOR) tablet 25 mg (not administered)   oxyCODONE-acetaminophen (PERCOCET) 5-325 mg per tablet 1 5 tablet (1 5 tablets Oral Given 11/1/17 0100)   pantoprazole (PROTONIX) EC tablet 40 mg (not administered)   potassium chloride (K-DUR,KLOR-CON) CR tablet 10 mEq (not administered)   pravastatin (PRAVACHOL) tablet 80 mg (not administered)   docusate sodium (COLACE) capsule 100 mg (not administered)   senna (SENOKOT) tablet 8 6 mg (not administered)   ondansetron (ZOFRAN) injection 4 mg (not administered)   levalbuterol (XOPENEX) inhalation solution 1 25 mg (1 25 mg Nebulization Given 11/1/17 0206)   enoxaparin (LOVENOX) subcutaneous injection 40 mg (not administered)   insulin lispro (HumaLOG) 100 units/mL subcutaneous injection 2-12 Units (not administered)   insulin lispro (HumaLOG) 100 units/mL subcutaneous injection 2-12 Units (2 Units Subcutaneous Not Given 11/1/17 0134)   acetaminophen (TYLENOL) tablet 650 mg (not administered)   furosemide (LASIX) injection 40 mg (not administered)   ipratropium (ATROVENT) 0 02 % inhalation solution 0 5 mg (not administered)   nitroglycerin (NITROSTAT) SL tablet 0 4 mg (0 4 mg Sublingual Given 10/31/17 2025)   aspirin chewable tablet 324 mg (324 mg Oral Given 10/31/17 2024)   albuterol (2 5 mg/3 mL) 0 083 % inhalation solution **AcuDose Override Pull** (5 mg  Given 10/31/17 2030)   albuterol inhalation solution 10 mg (10 mg Nebulization Given 10/31/17 2118)   ipratropium (ATROVENT) 0 02 % inhalation solution 1 mg (1 mg Nebulization Given 10/31/17 2118)   sodium chloride 0 9 % inhalation solution **AcuDose Override Pull** (9 mL  Given 10/31/17 2118)   furosemide (LASIX) injection 40 mg (40 mg Intravenous Given 10/31/17 2309)       Diagnostic Studies  Results Reviewed     Procedure Component Value Units Date/Time    Troponin I [[id number]]  (Normal) Collected:  11/01/17 0108    Lab Status:  Final result Specimen:  Blood from Arm, Left Updated:  11/01/17 0132     Troponin I <0 02 ng/mL     Narrative:         [employer] Chemistry analyzer 99% cutoff is > 0 04 ng/mL in network labs    o cTnI 99% cutoff is useful only when applied to patients in the clinical setting of myocardial ischemia  o cTnI 99% cutoff should be interpreted in the context of clinical history, ECG findings and possibly cardiac imaging to establish correct diagnosis  o cTnI 99% cutoff may be suggestive but clearly not indicative of a coronary event without the clinical setting of myocardial ischemia      Troponin I [[id number]]     Lab Status:  No result Specimen:  Blood     BNP [[id number]]  (Abnormal) Collected:  10/31/17 2010    Lab Status:  Final result Specimen:  Blood from Arm, Right Updated:  10/31/17 2127     NT-proBNP 334 (H) pg/mL     D-dimer, quantitative [[id number]]  (Normal) Collected:  10/31/17 2038    Lab Status:  Final result Specimen:  Blood from Arm, Right Updated:  10/31/17 2115     D-Dimer, Quant 382 ng/ml (FEU)     Comprehensive metabolic panel [[id number]]  (Abnormal) Collected:  10/31/17 2010    Lab Status:  Final result Specimen:  Blood from Arm, Right Updated:  10/31/17 2053     Sodium 139 mmol/L      Potassium 4 3 mmol/L      Chloride 102 mmol/L CO2 33 (H) mmol/L      Anion Gap 4 mmol/L      BUN 17 mg/dL      Creatinine 1 15 mg/dL      Glucose 73 mg/dL      Calcium 8 5 mg/dL      AST 34 U/L      ALT 30 U/L      Alkaline Phosphatase 84 U/L      Total Protein 7 9 g/dL      Albumin 2 9 (L) g/dL      Total Bilirubin 0 19 (L) mg/dL      eGFR 64 ml/min/1 73sq m     Narrative:         National Kidney Disease Education Program recommendations are as follows:  GFR calculation is accurate only with a steady state creatinine  Chronic Kidney disease less than 60 ml/min/1 73 sq  meters  Kidney failure less than 15 ml/min/1 73 sq  meters  CBC and differential [[id number]]  (Abnormal) Collected:  10/31/17 2010    Lab Status:  Final result Specimen:  Blood from Arm, Right Updated:  10/31/17 2028     WBC 11 65 (H) Thousand/uL      RBC 4 65 Million/uL      Hemoglobin 10 8 (L) g/dL      Hematocrit 37 0 %      MCV 80 (L) fL      MCH 23 2 (L) pg      MCHC 29 2 (L) g/dL      RDW 20 0 (H) %      MPV 9 9 fL      Platelets 119 Thousands/uL      nRBC 0 /100 WBCs      Neutrophils Relative 82 (H) %      Lymphocytes Relative 9 (L) %      Monocytes Relative 7 %      Eosinophils Relative 2 %      Basophils Relative 0 %      Neutrophils Absolute 9 48 (H) Thousands/µL      Lymphocytes Absolute 1 02 Thousands/µL      Monocytes Absolute 0 84 Thousand/µL      Eosinophils Absolute 0 25 Thousand/µL      Basophils Absolute 0 02 Thousands/µL     POCT troponin [[id number]]  (Normal) Collected:  10/31/17 2014    Lab Status:  Final result Updated:  10/31/17 2027     POC Troponin I 0 00 ng/ml      Specimen Type VENOUS    Narrative:         [employer] i-Stat handheld analyzer 99% cutoff is > 0 08ng/mL in network Emergency Departments    o cTnI 99% cutoff is useful only when applied to patients in the clinical setting of myocardial ischemia  o cTnI 99% cutoff should be interpreted in the context of clinical history, ECG findings and possibly cardiac imaging to establish correct diagnosis    o cTnI 99% cutoff may be suggestive but clearly not indicative of a coronary event without the clinical setting of myocardial ischemia  XR chest 1 view portable    (Results Pending)         Procedures  Procedures      Phone Consults  ED Phone Contact    ED Course  ED Course as of Nov 01 0210   Tue Oct 31, 2017   2039 Ekg: sinus tach, 105  Rbbb  No st or t wave abnl  2057 Patient states he is claustrophobic and cannot tolerate BiPAP  No improvement after sublingual nitro    2102 POC Troponin I: 0 00   2136 D-DIMER QUANTITATIVE: 382   2136 NT-proBNP: (!) 334   2242 Accepting physician Dr Qi Barrera requesting 40 IV Lasix  Identification of Seniors at [facility] Most Recent Value   (ISAR) Identification of Seniors at Risk   Before the illness or injury that brought you to the Emergency, did you need someone to help you on a regular basis? 1 Filed at: 10/31/2017 1953   In the last 24 hours, have you needed more help than usual?  0 Filed at: 10/31/2017 1953   Have you been hospitalized for one or more nights during the past 6 months? 1 Filed at: 10/31/2017 1953   In general, do you see well?  0 Filed at: 10/31/2017 1953   In general, do you have serious problems with your memory? 0 Filed at: 10/31/2017 1953   Do you take more than three different medications every day?   1 Filed at: 10/31/2017 1953   ISAR Score  3 Filed at: 10/31/2017 1953                          MDM  CritCare Time    Disposition  Final diagnoses:   Dyspnea   Pleural effusion   Acute hypoxemic respiratory failure (HCC)   Leukocytosis   Chest pain     Time reflects when diagnosis was documented in both MDM as applicable and the Disposition within this note     Time User Action Codes Description Comment    10/31/2017 10:40 PM Marcella Vargas S Add [I50 9] Congestive heart failure ([address] )     10/31/2017 10:40 PM Nikita Vargas Add [R06 00] Dyspnea     10/31/2017 10:40 PM Christos CHENG Add [J90] Pleural effusion     10/31/2017 10:41 PM Елена Stagger Add [J96 01] Acute hypoxemic respiratory failure ([facility] )     10/31/2017 10:41 PM Елена Stagger Add [F01 175] Leukocytosis     10/31/2017 10:41 PM Rober Vargas Modify [I50 9] Congestive heart failure ([facility] )     10/31/2017 10:41 PM Zi Risk S Modify [J96 01] Acute hypoxemic respiratory failure ([place] [address] )     11/1/2017  2:10 AM Rober Vargas Remove [I50 9] Congestive heart failure ([place] [address] )     11/1/2017  2:10 AM Rober Vargas Add [R07 9] Chest pain       ED Disposition     ED Disposition Condition Comment    Admit  Case was discussed with VIKTORIA and the patient's admission status was agreed to be Admission Status: inpatient status to the service of Dr Mary Ann Li   Follow-up Information    None       Current Discharge Medication List      CONTINUE these medications which have NOT CHANGED    Details   albuterol (2 5 mg/3 mL) 0 083 % nebulizer solution Take 2 5 mg by nebulization Indications: 2-3 times a day PRN        aspirin (ECOTRIN LOW STRENGTH) 81 mg EC tablet Take 81 mg by mouth daily      budesonide-formoterol (SYMBICORT) 160-4 5 mcg/act inhaler Inhale 2 puffs 2 (two) times a day      cyclobenzaprine (FLEXERIL) 10 mg tablet Take 1 tablet by mouth daily at bedtime  Qty: 30 tablet, Refills: 0      ferrous sulfate 325 (65 Fe) mg tablet Take 325 mg by mouth daily with breakfast      furosemide (LASIX) 40 mg tablet Take 1 tablet by mouth daily  Qty: 30 tablet, Refills: 0      gabapentin (NEURONTIN) 300 mg capsule Take 300 mg by mouth daily at bedtime        !! insulin regular (HumuLIN R U-500) 500 units/mL CONCENTRATED injection Inject 0 11 mL under the skin daily before breakfast  Qty: 1 mL, Refills: 0      !! insulin regular (HumuLIN R U-500) 500 units/mL CONCENTRATED injection Inject 0 07 mL under the skin daily before dinner  Qty: 1 mL, Refills: 0      metoprolol tartrate (LOPRESSOR) 25 mg tablet Take 25 mg by mouth 2 (two) times a day        oxyCODONE-acetaminophen (PERCOCET) 7 5-325 MG per tablet Take 1 tablet by mouth 2 (two) times a day  pantoprazole (PROTONIX) 40 mg tablet Take 1 tablet by mouth 2 (two) times a day before meals  Qty: 60 tablet, Refills: 0      potassium chloride (K-DUR,KLOR-CON) 10 mEq tablet Take 1 tablet by mouth daily  Qty: 30 tablet, Refills: 0      simvastatin (ZOCOR) 40 mg tablet Take 40 mg by mouth daily at bedtime  tiotropium (SPIRIVA) 18 mcg inhalation capsule Place 18 mcg into inhaler and inhale daily      acetaminophen (TYLENOL) 325 mg tablet Take 3 tablets by mouth every 8 (eight) hours  Qty: 30 tablet, Refills: 0       !! - Potential duplicate medications found  Please discuss with provider  No discharge procedures on file  ED Provider  Attending physically available and evaluated Edmond Raymundo I managed the patient along with the ED Attending      Electronically Signed by         Baljeet Foster DO  Resident  11/01/17 7822

## 2017-11-01 NOTE — PROGRESS NOTES
[facility] Hospitalist Service - Internal Medicine Progress Note  Patient: Marcella Estrella  81 y o  male   MRN: [id number]  PCP: Katina Sher MD  Unit/Bed#: [facility] 429-01 Encounter: [id number]  Date Of Visit: 17         Subjective:   Seen examined  No acute overnight events  States shortness of breath has improved  He was also seen by pulmonology earlier today  Notes that his baseline oxygen requirement is 3 L at home and he is currently saturating on 1-2 L here in the hospital   Currently denying any chest discomfort at this time  Objective:     Vitals:   Temp (24hrs), Av °F (36 7 °C), Min:97 6 °F (36 4 °C), Max:98 5 °F (36 9 °C)    HR:  [] 88  Resp:  [18-28] 20  BP: (111-192)/(56-83) 113/58  SpO2:  [89 %-99 %] 89 %  Body mass index is 37 55 kg/m²  Input and Output Summary (last 24 hours):        Intake/Output Summary (Last 24 hours) at 17 1746  Last data filed at 17 1732   Gross per 24 hour   Intake             1602 ml   Output             3500 ml   Net            -1898 ml       Physical Exam:     GENERAL:  Obese - improving respiratory distress  HEAD:  Normocephalic - atraumatic  EYES: PERRL - EOMI   MOUTH:  Mucosa moist  NECK:  Supple - full range of motion  CARDIAC:  Regular rate/rhythm - S1/S2 positive  PULMONARY:  Clear but mildly diminished breath sounds bilaterally more significant in the right base but nonlabored respirations currently  ABDOMEN:  Soft - nontender/nondistended - active bowel sounds  MUSCULOSKELETAL:  Motor strength/range of motion intact  NEUROLOGIC:  Alert/oriented x3   SKIN:  Chronic B/L LE venous stasis changes - age-appropriate wrinkles/blemishes otherwise   PSYCHIATRIC:  Mood/affect age appropriate      Additional Data:     Labs & Recent Cultures:       Results from last 7 days  Lab Units 17  0440 10/31/17  2010   WBC Thousand/uL 10 08 11 65*   HEMOGLOBIN g/dL 9 7* 10 8*   HEMATOCRIT % 34 1* 37 0   PLATELETS Thousands/uL 182  182 221 NEUTROS PCT %  --  82*   LYMPHS PCT %  --  9*   MONOS PCT %  --  7   EOS PCT %  --  2       Results from last 7 days  Lab Units 11/01/17  0440 10/31/17  2010   SODIUM mmol/L 138 139   POTASSIUM mmol/L 3 8 4 3   CHLORIDE mmol/L 99* 102   CO2 mmol/L 34* 33*   BUN mg/dL 18 17   CREATININE mg/dL 1 23 1 15   CALCIUM mg/dL 8 3 8 5   TOTAL PROTEIN g/dL  --  7 9   BILIRUBIN TOTAL mg/dL  --  0 19*   ALK PHOS U/L  --  84   ALT U/L  --  30   AST U/L  --  34   GLUCOSE RANDOM mg/dL 125 73                     Last 24 Hours Medication List:     aspirin 81 mg Oral Daily   budesonide-formoterol 2 puff Inhalation BID   cyclobenzaprine 10 mg Oral HS   docusate sodium 100 mg Oral BID   enoxaparin 40 mg Subcutaneous Daily   ferrous sulfate 325 mg Oral Daily With Breakfast   furosemide 40 mg Intravenous Daily   gabapentin 300 mg Oral HS   insulin lispro 2-12 Units Subcutaneous TID AC   insulin lispro 2-12 Units Subcutaneous HS   ipratropium 0 5 mg Nebulization TID   levalbuterol 1 25 mg Nebulization TID   metoprolol tartrate 25 mg Oral BID   pantoprazole 40 mg Oral BID AC   potassium chloride 10 mEq Oral Daily   pravastatin 80 mg Oral Daily With Dinner   senna 1 tablet Oral Daily         Assessments:    1  Shortness of breath - Chronic hypoxic respiratory failure  Plan:   · Multifactorial secondary to underlying COPD/diastolic CHF/CAD and obesity  · CXR and CT of chest imaging noted - see plan for individual assessments below  · Likely component of "obesity-hypoventilation syndrome is also a possibility  · Baseline home O2 requirement is 3L - currently at 1-2L at rest during hospital stay      2    Chronic Diastolic CHF - Chronic right pleural effusion  Plan:   · Recent EF of 65%  · C/w Lasix/Lopressor regimen  · Not in acute exacerbation   · CXR/CT chest did show a mild increase in known right pleural effusion but after discussion with pulmonology today, no emergent indication for thoracentesis at this time - outpatient follow-up/monitoring     3  COPD  Plan:   · Pulmonology input appreciated  · Not in acute exacerbation - continue to encourage smoking cessation  · Counseled on compliance w/ breathing regimen (both maintenance and breakthrough agents)     4  CAD  Plan:   · S/p prior stenting   · In light of recurrent SOB and cardiac history, will appreciate cardiology input considering no further workup from pulmonary standpoint   · C/w ASA/Lopressor/Pravachol     5  Chronic kidney disease stage 3  Plan:   · Creatinine stable/baseline     6  Hypertension  Plan:   · C/w Lopressor     7  Insulin-dependent diabetes mellitus  Plan:   · C/w insulin regimen   · Last HgA1c of 6 8         VTE Prophylaxis:  Lovenox        Time Spent for Care: 33 minutes  More than 50% of total time spent on counseling and coordination of care as described above  Current Length of Stay: 1 day(s)      Code Status: Level 1 - Full Code       Today, Patient Was Seen By: Lauren Avendaño MD    ** Please Note: This note has been constructed using a voice recognition system   **

## 2017-11-01 NOTE — H&P
History and Physical - [facility]    Patient Information: Tobias Bell  81 y o  male MRN: [id number]  Unit/Bed#: ED 06 Encounter: [id number]  Admitting Physician: Obdulia Sanchez MD  PCP: Blanka Leonardo MD  Date of Admission:  10/31/17    Assessment/Plan:    Hospital Problem List:     Principal Problem:    SOB (shortness of breath)  Active Problems:    Coronary artery disease involving native coronary artery without angina pectoris    Gastroesophageal reflux disease without esophagitis    Obesity (BMI 30-39  9)    Dyslipidemia    H/O prostate cancer    Type 2 diabetes mellitus with hyperglycemia (HCC)    Venous stasis dermatitis of both lower extremities    Opioid dependence (HCC)    Chronic respiratory failure with hypoxia (HCC)    COPD (chronic obstructive pulmonary disease) (HCC)    Bilateral lower extremity edema    CKD (chronic kidney disease)    Pleural effusion on right    Accelerated hypertension      Plan for the Primary Problem(s):  · Dyspnea multifactorial secondary to right-sided pleural effusion, COPD with chronic hypoxic respiratory failure ,obesity, accelerated hypertension and acute on chronic diastolic heart failure  · Patient will be admitted to telemetry proceed with serial cardiac enzymes, continue IV Lasix last time he was admitted for GI bleed and was diuresed upon discharge secondary to found iatrogenic  fluid overload   proBNP slightly  elevated ,patient has chronic bilateral lower extremity edema, unclear compliance/ or  use of CPAP  Monitor  electrolytes and renal function  Monitor daily weight intake and output  Echocardiogram done on September 17 reported ejection fraction to be 76%, grade 1 diastolic dysfunction   Continue respiratory protocol nebs Symbicort supplemental oxygen  Monitor off antibiotic given patient denies increase of sputum production or fever    Tight blood pressure control   given a right-sided pleural effusion patient will be evaluated by pulmonary specialist     Plan for Additional Problems:   · Accelerated hypertension continue home Rx, compliance discussed  · Opioid dependence continue home Devin  · Diabetes   Accu-Cheks, insulin sliding scale  · Chronic respiratory failure with hypoxia, continue oxygen supplement  · Obesity with most likely obstructive sleep apnea, patient does not use CPAP or BiPAP pulmonary evaluation  ·  Bilateral lower extremity edema chronic with venous stasis changes monitor off antibiotic  Continue Lasix IV  · CKD stage 3 monitor renal function given patient will be on IV antibiotic  · Hyperlipidemia continue statin    VTE Prophylaxis: Enoxaparin (Lovenox)  / sequential compression device   Code Status: full  POLST: There is no POLST form on file for this patient (pre-hospital)    Anticipated Length of Stay:  Patient will be admitted on an Inpatient basis with an anticipated length of stay of  > 2 midnights  Justification for Hospital Stay:  Pulmonary evaluation    Total Time for Visit, including Counseling / Coordination of Care: 45 minutes  Greater than 50% of this total time spent on direct patient counseling and coordination of care  Chief Complaint:   Shortness of breath    History of Present Illness:    Honorio Henry  is a 81 y o  male with history of CAD,s/p stents,HTN,DM,dHF,COPD, chronic hypoxic respiratory failure , oxygen dependent ,  obesity who presented to the emergency room with right-sided pressure-like nonradiating chest pain started at rest today and progressively getting worse WINTERS X 6-7 days  Patient has dry cough denies sputum production fever chills orthopnea nausea vomiting diarrhea  He denies getting worse bilateral chronic lower extremity edema and venous stasis changes  He was admitted to the hospital on 9/28-10/5 due to UGIB and partial outlet obstruction, EGD revealed multiple gastric and duodenal ulcers, clipped and treated with epi    He was discharged with stable hemoglobin and Lasix 40 mg  daily secondary to developed  fluid overload  MARANDA on CKD improved  Patient was advised to stop NSAIDs and continue baby aspirin  Further workup in the ER revealed right-sided pleural effusion on  imaging study ,blood pressure 192/83 present on admission   proBNP 380, troponin negative ,WBC 11 6  The patient was tachypneic, in mild respiratory distress required nebs treatment ,his O2 sat in mid 90s on supplemental oxygen   he did not report increased oxygen demand  Patient reports improvement of shortness of breath after nebs treatment and IV Lasix  Admitted on inpatient basis        of coronary artery disease, prior stenting of the right coronary artery and diagonal 1, last coronary [id number] new obstructive disease, stents were patent, hypertension,            Review of Systems:    Review of Systems   Respiratory: Positive for shortness of breath  Past Medical and Surgical History:     Past Medical History:   Diagnosis Date    Abdominal pain     Cardiac disease     [facility] [address] [facility]     Coronary artery disease     [facility]     Hyperlipidemia     Hypertension     MI (myocardial infarction)     with 3 stents    Prostate [facility])        Past Surgical History:   Procedure Laterality Date    ABDOMINAL SURGERY      exploratory    ANGIOPLASTY      3 stents    ESOPHAGOGASTRODUODENOSCOPY N/A 10/2/2017    Procedure: ESOPHAGOGASTRODUODENOSCOPY (EGD); Surgeon: Kirsten Denton MD;  Location: [facility]; Service: Gastroenterology    PROSTATE SURGERY         Meds/Allergies:    Prior to Admission medications    Medication Sig Start Date End Date Taking? Authorizing Provider   acetaminophen (TYLENOL) 325 mg tablet Take 3 tablets by mouth every 8 (eight) hours 7/24/17  Yes Marisol Gerardo, DO   albuterol (2 5 mg/3 mL) 0 083 % nebulizer solution Take 2 5 mg by nebulization Indications: 2-3 times a day PRN     Yes Historical Provider, MD aspirin (ECOTRIN LOW STRENGTH) 81 mg EC tablet Take 81 mg by mouth daily   Yes Historical Provider, MD   budesonide-formoterol (SYMBICORT) 160-4 5 mcg/act inhaler Inhale 2 puffs 2 (two) times a day   Yes Historical Provider, MD   cyclobenzaprine (FLEXERIL) 10 mg tablet Take 1 tablet by mouth daily at bedtime 10/5/17  Yes Illona Hashimoto, MD   ferrous sulfate 325 (65 Fe) mg tablet Take 325 mg by mouth daily with breakfast   Yes Historical Provider, MD   furosemide (LASIX) 40 mg tablet Take 1 tablet by mouth daily 10/5/17  Yes Illona Hashimoto, MD   gabapentin (NEURONTIN) 300 mg capsule Take 300 mg by mouth daily at bedtime     Yes Historical Provider, MD   insulin regular (HumuLIN R U-500) 500 units/mL CONCENTRATED injection Inject 0 11 mL under the skin daily before breakfast 10/6/17  Yes Illona Hashimoto, MD   insulin regular (HumuLIN R U-500) 500 units/mL CONCENTRATED injection Inject 0 07 mL under the skin daily before dinner 10/5/17  Yes Illona Hashimoto, MD   metoprolol tartrate (LOPRESSOR) 25 mg tablet Take 25 mg by mouth 2 (two) times a day     Yes Historical Provider, MD   oxyCODONE-acetaminophen (PERCOCET) 7 5-325 MG per tablet Take 1 tablet by mouth 2 (two) times a day  Yes Historical Provider, MD   pantoprazole (PROTONIX) 40 mg tablet Take 1 tablet by mouth 2 (two) times a day before meals 10/5/17  Yes Illona Hashimoto, MD   potassium chloride (K-DUR,KLOR-CON) 10 mEq tablet Take 1 tablet by mouth daily 10/5/17  Yes Illona Hashimoto, MD   simvastatin (ZOCOR) 40 mg tablet Take 40 mg by mouth daily at bedtime     Yes Historical Provider, MD   tiotropium (SPIRIVA) 18 mcg inhalation capsule Place 18 mcg into inhaler and inhale daily   Yes Historical Provider, MD   docusate sodium (COLACE) 100 mg capsule Take 1 capsule by mouth 2 (two) times a day 10/5/17 10/31/17  Illona Hashimoto, MD   glipiZIDE (GLUCOTROL) 5 mg tablet Take 5 mg by mouth 2 (two) times a day before meals Per patients pharmancy they are unsure if pt still taking he has not had prescription filled in a while and family did not state if patient takes med currently  10/31/17  Historical Provider, MD   insulin regular (HumuLIN R U-500) 500 units/mL CONCENTRATED injection Inject 0 11 mL under the skin daily before lunch 10/5/17 10/31/17  Akhil Mcgregor MD   lidocaine (LIDODERM) 5 % Place 1 patch on the skin daily Remove & Discard patch within 12 hours or as directed by MD 10/5/17 10/31/17  Akhil Mcgregor MD   Menthol 5 4 MG LOZG Apply 1 lozenge to the mouth or throat every 2 (two) hours as needed (sore throat) 10/5/17 10/31/17  Akhil Mcgregor MD   Omega-3 Fatty Acids (FISH OIL) 1200 MG CAPS Take 1,200 mg by mouth daily  10/31/17  Historical Provider, MD   senna-docusate sodium (SENOKOT S) 8 6-50 mg per tablet Take 1 tablet by mouth daily at bedtime 10/5/17 10/31/17  Akhil Mcgregor MD     I have reviewed home medications with a medical source (PCP, Pharmacy, other)  Allergies: Allergies   Allergen Reactions    Metformin        Social History:     Marital Status: /Civil Union   Occupation: none  Patient Pre-hospital Living Situation: home  Patient Pre-hospital Level of Mobility: reg  Patient Pre-hospital Diet Restrictions: reg  Substance Use History:   History   Alcohol Use No     History   Smoking Status    Former Smoker    Packs/day: 1 00   Smokeless Tobacco    Never Used     History   Drug Use No       Family History:    non-contributory    Physical Exam:     Vitals:   Blood Pressure: 127/57 (10/31/17 2230)  Pulse: 94 (10/31/17 2312)  Temperature: 97 7 °F (36 5 °C) (10/31/17 1951)  Temp Source: Tympanic (10/31/17 1951)  Respirations: (!) 24 (10/31/17 2312)  Weight - Scale: 127 kg (279 lb) (10/31/17 1951)  SpO2: 97 % (10/31/17 2312)    Physical Exam   Constitutional: He appears well-developed  HENT:   Head: Normocephalic  Eyes: Pupils are equal, round, and reactive to light  Neck: Normal range of motion  Cardiovascular: Regular rhythm  Pulmonary/Chest: No respiratory distress  Abdominal: He exhibits distension  There is no tenderness  There is no rebound  Musculoskeletal: He exhibits edema  Skin:   Venous stasis bilateral lower extremity   Psychiatric: He has a normal mood and affect  Additional Data:     Lab Results: I have personally reviewed pertinent reports  Results from last 7 days  Lab Units 10/31/17  2010   WBC Thousand/uL 11 65*   HEMOGLOBIN g/dL 10 8*   HEMATOCRIT % 37 0   PLATELETS Thousands/uL 221   NEUTROS PCT % 82*   LYMPHS PCT % 9*   MONOS PCT % 7   EOS PCT % 2       Results from last 7 days  Lab Units 10/31/17  2010   SODIUM mmol/L 139   POTASSIUM mmol/L 4 3   CHLORIDE mmol/L 102   CO2 mmol/L 33*   BUN mg/dL 17   CREATININE mg/dL 1 15   CALCIUM mg/dL 8 5   TOTAL PROTEIN g/dL 7 9   BILIRUBIN TOTAL mg/dL 0 19*   ALK PHOS U/L 84   ALT U/L 30   AST U/L 34   GLUCOSE RANDOM mg/dL 73           Imaging: I have personally reviewed pertinent reports  Xr Chest Pa & Lateral    Result Date: 10/4/2017  Narrative: CHEST INDICATION:  Fever  COMPARISON:  9/28/2017 VIEWS:  Frontal and lateral projections IMAGES:  3 FINDINGS:     Stable cardiomediastinal structures  Hyperinflated lung fields  No congestive failure  No pneumothorax  Flattening of the hemidiaphragms  Blunting of the costophrenic angles  Possible small right basilar effusion  Stable bony structures  Old right-sided rib fractures along the inferior lateral aspect of the chest      Impression: Hyperinflation consistent with underlying COPD  Possible small right basilar effusion  Workstation performed: [id number]       EKG, Pathology, and Other Studies Reviewed on Admission:   · EKG: RBBB,sinus    Allscripts / Epic Records Reviewed: Yes     ** Please Note: This note has been constructed using a voice recognition system   **

## 2017-11-01 NOTE — CONSULTS
Pulmonary Consultation   Mariama Vega  81 y o  male MRN: [id number]  Unit/Bed#: [facility] 429-01 Encounter: [id number]      Reason for consultation:   Pleural effusion    Requesting physician:   Ailin Jorge    Impressions:   · Small right-sided pleural effusion  Although this is little bigger than the previous study from September, it is relatively small  No thoracentesis is indicated at this time  It is most likely transudative  · Shortness of breath  This is multifactorial secondary to his underlying cardiac disease as well as COPD  · COPD without acute exacerbation  · Chronic hypoxemic respiratory failure  · Atypical chest pain  Recommendations:  · No thoracentesis is indicated this point in time  · Continue bronchodilators  · Oxygen supplement to maintain O2 saturation above 88%  · Follow-up in the office as previously scheduled  History of Present Illness   HPI:  Hitesh Padilla Sr  is a 81 y o  male who was admitted yesterday because of right-sided chest pressure and shortness of Breath  The patient has chronic obstructive pulmonary disease and chronic hypoxemic respiratory failure on home O2  He has chronic dyspnea on exertion  His wife stated that it has worsened over the last 10 days  There was no associated cough, wheezing or sputum production  Yesterday he had chest pressure mainly on the right side of the chest   He denies worsening lower extremity edema that his baseline  No fever or chills  The patient was admitted to the hospital and was evaluated  They found that he has an enlarging right-sided pleural effusion on the chest x-ray compared to a study from a month ago  The patient was [facility] to [facility]  She was found to have upper GI bleed from gastric and duodenal ulcers  When I saw the patient he stated that his right-sided chest pressure has resolved  His breathing is back to his baseline      Review of systems:  12 point review of systems was completed and was otherwise negative except as listed in HPI  Historical Information   Past Medical History:   Diagnosis Date    Abdominal pain     Cardiac disease     [facility] [address] [facility]     Coronary artery disease     Diabetes mellitus [facility] [facility]     Hyperlipidemia     Hypertension     MI (myocardial infarction)     with 3 stents    Prostate cancer [facility])      Past Surgical History:   Procedure Laterality Date    ABDOMINAL SURGERY      exploratory    ANGIOPLASTY      3 stents    ESOPHAGOGASTRODUODENOSCOPY N/A 10/2/2017    Procedure: ESOPHAGOGASTRODUODENOSCOPY (EGD); Surgeon: Ricardo Daley MD;  [facility]; Service: Gastroenterology    PROSTATE SURGERY       Family History   Problem Relation Age of Onset    Heart disease Father        Social History:  The patient is  and lives with his wife  He has about fifty pack year smoking history      Meds/Allergies   Current Facility-Administered Medications   Medication Dose Route Frequency    acetaminophen (TYLENOL) tablet 650 mg  650 mg Oral Q6H PRN    albuterol inhalation solution 2 5 mg  2 5 mg Nebulization Q4H PRN    aspirin (ECOTRIN LOW STRENGTH) EC tablet 81 mg  81 mg Oral Daily    budesonide-formoterol (SYMBICORT) 160-4 5 mcg/act inhaler 2 puff  2 puff Inhalation BID    cyclobenzaprine (FLEXERIL) tablet 10 mg  10 mg Oral HS    docusate sodium (COLACE) capsule 100 mg  100 mg Oral BID    enoxaparin (LOVENOX) subcutaneous injection 40 mg  40 mg Subcutaneous Daily    ferrous sulfate tablet 325 mg  325 mg Oral Daily With Breakfast    furosemide (LASIX) injection 40 mg  40 mg Intravenous Daily    gabapentin (NEURONTIN) capsule 300 mg  300 mg Oral HS    insulin lispro (HumaLOG) 100 units/mL subcutaneous injection 2-12 Units  2-12 Units Subcutaneous TID AC    insulin lispro (HumaLOG) 100 units/mL subcutaneous injection 2-12 Units  2-12 Units Subcutaneous HS    ipratropium (ATROVENT) 0 02 % inhalation solution 0 5 mg  0 5 mg Nebulization TID    levalbuterol (XOPENEX) inhalation solution 1 25 mg  1 25 mg Nebulization TID    metoprolol tartrate (LOPRESSOR) tablet 25 mg  25 mg Oral BID    ondansetron (ZOFRAN) injection 4 mg  4 mg Intravenous Q6H PRN    oxyCODONE-acetaminophen (PERCOCET) 5-325 mg per tablet 1 5 tablet  1 5 tablet Oral Q6H PRN    pantoprazole (PROTONIX) EC tablet 40 mg  40 mg Oral BID AC    potassium chloride (K-DUR,KLOR-CON) CR tablet 10 mEq  10 mEq Oral Daily    pravastatin (PRAVACHOL) tablet 80 mg  80 mg Oral Daily With Dinner    senna (SENOKOT) tablet 8 6 mg  1 tablet Oral Daily     Prescriptions Prior to Admission   Medication    albuterol (2 5 mg/3 mL) 0 083 % nebulizer solution    aspirin (ECOTRIN LOW STRENGTH) 81 mg EC tablet    budesonide-formoterol (SYMBICORT) 160-4 5 mcg/act inhaler    cyclobenzaprine (FLEXERIL) 10 mg tablet    ferrous sulfate 325 (65 Fe) mg tablet    furosemide (LASIX) 40 mg tablet    gabapentin (NEURONTIN) 300 mg capsule    insulin regular (HumuLIN R U-500) 500 units/mL CONCENTRATED injection    insulin regular (HumuLIN R U-500) 500 units/mL CONCENTRATED injection    metoprolol tartrate (LOPRESSOR) 25 mg tablet    oxyCODONE-acetaminophen (PERCOCET) 7 5-325 MG per tablet    pantoprazole (PROTONIX) 40 mg tablet    potassium chloride (K-DUR,KLOR-CON) 10 mEq tablet    simvastatin (ZOCOR) 40 mg tablet    tiotropium (SPIRIVA) 18 mcg inhalation capsule    acetaminophen (TYLENOL) 325 mg tablet     Allergies   Allergen Reactions    Metformin        Vitals: Blood pressure 113/58, pulse 88, temperature 98 1 °F (36 7 °C), temperature source Oral, resp   rate 20, height 6' (1 829 m), weight 126 kg (276 lb 14 4 oz), SpO2 (!) 89 % ,      Intake/Output Summary (Last 24 hours) at 11/01/17 1544  Last data filed at 11/01/17 1538   Gross per 24 hour   Intake             1482 ml   Output             3050 ml   Net            -1568 ml       Physical exam: Head/eyes:    Normocephalic, without obvious abnormality, atraumatic,         PERRL, extraocular muscles intact, no scleral icterus    Nose:   Nares normal, septum midline, mucosa normal, no drainage    or sinus tenderness   Throat:   Moist mucous membranes, no thrush   Neck:   Supple, trachea midline, no adenopathy; no carotid    bruit or JVD   Lungs:     Decreased breath sounds  No wheezing or rhonchi  Heart:    Regular rate and rhythm, S1 and S2 normal, no murmur, rub   or gallop   Abdomen:     Soft, non-tender, bowel sounds active all four quadrants,     no masses, no organomegaly   Extremities:   Extremities normal, atraumatic, no cyanosis  He has 2+ ankle edema   Skin:   Warm, dry, turgor normal, no rashes or lesions   Neurologic:   CNII-XII intact, normal strength, non-focal             Labs:   CBC:   Lab Results   Component Value Date    WBC 10 08 11/01/2017    HGB 9 7 (L) 11/01/2017    HCT 34 1 (L) 11/01/2017    MCV 81 (L) 11/01/2017     11/01/2017     11/01/2017    MCH 23 0 (L) 11/01/2017    MCHC 28 4 (L) 11/01/2017    RDW 20 1 (H) 11/01/2017    MPV 9 5 11/01/2017    MPV 9 7 11/01/2017    NRBC 0 10/31/2017   , CMP:   Lab Results   Component Value Date     11/01/2017    K 3 8 11/01/2017    CL 99 (L) 11/01/2017    CO2 34 (H) 11/01/2017    ANIONGAP 5 11/01/2017    BUN 18 11/01/2017    CREATININE 1 23 11/01/2017    GLUCOSE 125 11/01/2017    CALCIUM 8 3 11/01/2017    AST 34 10/31/2017    ALT 30 10/31/2017    ALKPHOS 84 10/31/2017    PROT 7 9 10/31/2017    ALBUMIN 2 9 (L) 10/31/2017    BILITOT 0 19 (L) 10/31/2017    EGFR 59 11/01/2017       Imaging and other studies: I have personally reviewed pertinent films in PACS chest x-rays reviewed on the [employer] system and it is suboptimal portable film  There is small right-sided pleural effusion  CT scan of the chest also reviewed  There is no PE    Small right-sided pleural effusion which looks better than the one seen on the CT on September 28        Code Status: Level 1 - Full Code      Raghu Pepper MD

## 2017-11-01 NOTE — PROGRESS NOTES
Discussed use of venodynes as ordered  Patient reports discomfort when using and decided to refuse despite education regarding risks associated with not using  Patient signed refusal form

## 2017-11-01 NOTE — SOCIAL WORK
Pt is <30-day readmit  Pt's last admit was 9/28/17 - 10/5/17  Pt has hx of multiple admits  This current admit is pt's 4th admit within last 3 months since July 2017  CM met w/ pt to obtain info  Pt reported he resides w/ his wife only in a 2-story house w/ 13 steps to 2nd floor and 1 step to enter house at front door  Pt's wife Tauna Nyhan ([phone]) is primary contact  Pt reported he is independent at baseline w/ ambulating and performing his ADLS  Pt reported having DME in home consisting of a cane, rolling walker, scooter, and Home O2 for PRN use  Pt has hx of [address] services in 2017 w/ [facility]  Pt reported no hx of i/p rehab placements  Pt denies hx of D&A or mental health issues  Pt reported his PCP is Dr Manny Dillard through [address] [facility]  Pt reported he uses [facility] located on [facility] ([address]) in [facility] for his Rx needs  Pt reported he is retired and receives [employer] for income  Pt is enrolled in [employer] Medicare-replacement insurance for healthcare coverage and Rx benefits  Pt reported he does not have a legally pre-designated medical POA appointed for himself  Pt reported his family members can provide transportation for him at time of d/c     CM reviewed d/c planning process including the following: identifying help at home, patient preference for d/c planning needs, Discharge Lounge, [facility] Meds to Bed program, availability of treatment team to discuss questions or concerns patient and/or family may have regarding understanding medications and recognizing signs and symptoms once discharged  CM also encouraged patient to follow up with all recommended appointments after discharge  Patient advised of importance for patient and family to participate in managing patients medical well being  CM will reassess pt for d/c needs once recommendations for his aftercare are made by the tx team  CM to follow

## 2017-11-01 NOTE — RESPIRATORY THERAPY NOTE
RT Protocol Note  Ginette Galeana Sr  81 y o  male MRN: [id number]  Unit/Bed#: [facility] 429-01 Encounter: [id number]    Assessment     Principal Problem:    SOB (shortness of breath)  Active Problems:    Coronary artery disease involving native coronary artery without angina pectoris    Gastroesophageal reflux disease without esophagitis    Obesity (BMI 30-39  9)    Dyslipidemia    H/O prostate cancer    Type 2 diabetes mellitus with hyperglycemia (HCC)    Venous stasis dermatitis of both lower extremities    Opioid dependence (HCC)    Chronic respiratory failure with hypoxia (HCC)    COPD (chronic obstructive pulmonary disease) (HCC)    Bilateral lower extremity edema    CKD (chronic kidney disease)    Pleural effusion on right    Accelerated hypertension      Home Pulmonary Medications: Spiriva, albuterol  Past Medical History:   Diagnosis Date    Abdominal pain     Cardiac disease     COPD [facility]chronic obstructive pulmonary disease) ([facility] )     Coronary artery disease     Diabetes mellitus ([place] [address] )     Hyperlipidemia     Hypertension     MI (myocardial infarction)     with 3 stents    Prostate cancer ([place] [address] )      Social History     Social History    Marital status: /Civil Union     Spouse name: N/A    Number of children: N/A    Years of education: N/A     Social History Main Topics    Smoking status: Former Smoker     Packs/day: 1 50     Years: 50 00     Quit date: 9/13/2017    Smokeless tobacco: Never Used    Alcohol use No    Drug use: No    Sexual activity: Not Asked     Other Topics Concern    None     Social History Narrative    None       Subjective          Objective     Physical Exam:   Assessment Type: Pre-treatment  General Appearance: Alert, Awake  Respiratory Pattern: Normal  Bilateral Breath Sounds: Diminished    Vitals:  Blood pressure 144/60, pulse 90, temperature 98 1 °F (36 7 °C), temperature source Oral, resp   rate (!) 24, height 6' (1 829 m), weight 126 kg (276 lb 14 4 oz), SpO2 97 %           Imaging and other studies: I have personally reviewed pertinent reports  Plan     Respiratory Plan: Mild Distress pathway  Resp Comments: (P)   Pt w/significant COPD/tob hx (>60pkyrs)  Admit for SOB/chest pain  Rx'd in ED w/bronchodilators and very short period of BiPAP (pt refused)  Pt appears in NAD  BS decreased throughout, coarse RLL  Non-productive cough  SpO2 97% on 3L NC  Able to speak in complete sentences  Will continue to Rx w/bronchodilators

## 2017-11-01 NOTE — PHYSICAL THERAPY NOTE
PHYSICAL THERAPY NOTE          Patient Name: Toni Limon  Today's Date: 11/1/2017 11/01/17 1101   Note Type   Note type Eval only   Pain Assessment   Pain Score 8   Pain Location Back; Chest   Home Living   Type of [place] Walker;Cane;Electric scooter   Additional Comments Uses cane in home; Scooter in community   Prior Function   Level of [place] Needs assistance with IADLs; Needs assistance with ADLs and functional mobility   Lives With Spouse; Son   Joseluis Help From Family  (wife, son)   Restrictions/Precautions   Weight Bearing Precautions Per Order No   General   Family/Caregiver Present Yes   RLE Assessment   RLE Assessment WFL   LLE Assessment   LLE Assessment WFL   Coordination   Movements are Fluid and Coordinated 1   Sensation WFL   Transfers   Sit to Stand 4  Minimal assistance   Stand to Sit 5  Supervision   Ambulation/Elevation   Gait pattern Wide OBEY   Gait Assistance 4  Minimal assist  (A for 02 line)   Additional items Assist x 1   Assistive Device SPC   Distance 100  (x2 with 1 L 02 via NC; SP02 at rest = 90%, with amb = 76%)   Balance   Static Sitting Good   Dynamic Sitting Fair +   Static Standing Fair +   Dynamic Standing Fair   Endurance Deficit   Endurance Deficit Yes   Endurance Deficit Description SOB and SP02 drop with activity   Activity Tolerance   Activity Tolerance Patient limited by fatigue;Patient limited by pain   Nurse Made Aware Yes   Assessment   Prognosis Good   Problem List Decreased endurance;Decreased mobility;Pain;Decreased safety awareness   Assessment Pt is a 81year old male with diagnosis of acute hypoxemic respiratory failure with R pleural effusion who presented to ED with  SOB and chest pain  Pt with PMH of CHF, COPD, 02 dependence, prostate CA, CAD, GERD, HLD, DM II, opiod dependence, CKD and HTN   Pt presents with deficits in activity tolerance, balance, endurance and overall functional mobility  He has decreased safety awareness and insight into current functional deficits  Pt required min A for sit-stand transfers, as well as min A to ambulate with RW wtih seated rest break due to SOB with correlating drop in SP02 level to 76% with 1L02 via NC  Pt required 5 mins of seated rest and cues for pursed lip breathing for SP02 to return to 95%  Pt will benefit from continued skilled PT services in order to address his limitations and to ensure safe d/c home when medically cleared  Barriers to Discharge Other (Comment)  (Full flight of stairs to bathroom, Decrease SP02 w/activity)   Goals   Patient Goals To get back in bed  STG Expiration Date 11/08/17   Short Term Goal #1 1  I bed mobility 2  mod I tranfers 3  mod I amb x 100ft with SPC    LTG Expiration Date 11/15/17   Long Term Goal #1 1  ascend/descend 13 steps with R HR and S   Plan   Treatment/Interventions Functional transfer training;Elevations; Therapeutic exercise; Endurance training;Patient/family training;Gait training   PT Frequency 5x/wk   Recommendation   Recommendation Home PT; Home with family support   Equipment Recommended (Pt has cane and scooter at home  )   PT - OK to Discharge No   Barthel Index   Feeding 10   Bathing 0   Grooming Score 5   Dressing Score 5   Bladder Score 10   Bowels Score 10   Toilet Use Score 10   Transfers (Bed/Chair) Score 10   Mobility (Level Surface) Score 10   Stairs Score 0   Barthel Index Score 70       Elvia Prior, PT

## 2017-11-01 NOTE — SOCIAL WORK
Pt is recommended to have in-home PT, in-home OT, and in-home skilled nursing, all via [facility] services for his aftercare plan  CM met w/ pt to discuss recommendation  Pt is agreeable  CM provided pt w/ list of local [facility] providers that are in network w/ his [employer]/[facility] Medicare-replacement plan insurance  Pt reviewed list and requested referral to [facility]  HUMA made Ecin referral to [facility]  CM to follow

## 2017-11-01 NOTE — OCCUPATIONAL THERAPY NOTE
Occupational Therapy Evaluation      Esther Bridges Sr     11/1/2017    Patient Active Problem List   Diagnosis    Coronary artery disease involving native coronary artery without angina pectoris    Gastroesophageal reflux disease without esophagitis    Obesity (BMI 30-39  9)    Dyslipidemia    H/O prostate cancer    Back pain    Type 2 diabetes mellitus with hyperglycemia (HCC)    Venous stasis dermatitis of both lower extremities    Opioid dependence (HCC)    Chronic respiratory failure with hypoxia (HCC)    COPD (chronic obstructive pulmonary disease) (HCC)    Bilateral lower extremity edema    Hyponatremia    CKD (chronic kidney disease)    Duodenitis    SOB (shortness of breath)    Pleural effusion on right    Accelerated hypertension       Past Medical History:   Diagnosis Date    Abdominal pain     Cardiac disease     COPD (chronic obstructive pulmonary disease) (HCC)     Coronary artery disease     [facility] )     Hyperlipidemia     Hypertension     MI (myocardial infarction)     with 3 stents    Prostate cancer ([facility] )        Past Surgical History:   Procedure Laterality Date    ABDOMINAL SURGERY      exploratory    ANGIOPLASTY      3 stents    ESOPHAGOGASTRODUODENOSCOPY N/A 10/2/2017    Procedure: ESOPHAGOGASTRODUODENOSCOPY (EGD); Surgeon: Radha Fleming MD;  Location: [facility];   Service: Gastroenterology    PROSTATE SURGERY          11/01/17 1100   Restrictions/Precautions   Other Precautions O2;Fall Risk   Pain Assessment   Pain Assessment 0-10   Pain Score 4   Pain Location Chest;Back   Hospital Pain Intervention(s) Repositioned   Response to Interventions tolerated   Multiple Pain Sites Yes   Home Living   Type of [address] [place] [address] Two level   Bathroom Shower/Tub Tub/shower unit   Bathroom Equipment Shower chair   Bathroom Accessibility (2nd floor- 13steps )   Home Equipment Kaila Runner  (scooter)   Prior Function   Level of [place] Independent with ADLs and functional mobility   Lives With Spouse   Receives Help From Family   ADL Assistance (showers)   Lifestyle   [place] Independent with basic self care and mobility with spc and home o2  Wife does driving, and assists with showers  Pt  has scooter for distance  Walks short distances in house due to chronic back pain, and breathing difficulty  Reciprocal Relationships supportive wife? significant other present during session- home and able to assist as needed   Service to Others  in the past    Intrinsic Gratification fairly sedentary due to breathing and back pain   Subjective   Subjective "I don't know if I want to do this now"   ADL   Eating Assistance 7  [place] 5  Supervision/Setup   LB [address] 5  Supervision/Setup   UB Dressing Assistance 5  Supervision/Setup   LB Dressing Assistance 5  Supervision/Setup   Additional Comments has supervision for showers    Transfers   Sit to Stand 5  Supervision   Functional Mobility   Functional Mobility 5  Supervision   Additional Comments o2   Additional items SPC   Activity Tolerance   Activity Tolerance Patient limited by fatigue;Patient limited by pain   RUE Assessment   RUE Assessment WFL   LUE Assessment   LUE Assessment WFL   Cognition   Overall Cognitive Status WFL   Orientation Level Oriented X4   Assessment   Limitation Decreased endurance   Prognosis Fair   Assessment Pt  participated in functional OT eval s/p admission with pleural effusion, shortness of breath, copd, dm2, HTN  Pt  currently presents with shortness of breath and decreased atcivity tolerance however functioning at SBA level for basic self care and mobility with SPC  Pt  reports sedentary at home 2* back pain and shortness of breath  Discussed pacing, and the importance of participation in activity  Pt  verbalizes understanding however ? carryover   Discussed recommendation of homes services including home OT to address DME needs and pacing and adaptation during routine ADLS in home setting  At this time no immediate inpt OT needs  Recommend coninued activity and participation in all ADLS      Recommendation   OT Discharge Recommendation Home OT   Barthel Index   Feeding 10   Bathing 5   Grooming Score 5   Dressing Score 10   Bladder Score 10   Bowels Score 10   Toilet Use Score 10   Transfers (Bed/Chair) Score 10   Mobility (Level Surface) Score 10   Stairs Score 0   Barthel Index Score 80     John Alexander, OT

## 2017-11-01 NOTE — CASE MANAGEMENT
[facility] in the [facility] by Onofre Carroll for 2017  Network Utilization Review Department  Phone: [phone]; Fax [phone]  ATTENTION: The Network Utilization Review Department is now centralized for our 7 Facilities  Make a note that we have a new phone and fax numbers for our Department  Please call with any questions or concerns to [phone] and carefully follow the prompts so that you are directed to the right person  All voicemails are confidential  Fax any determinations, approvals, denials, and requests for initial or continue stay review clinical to [phone]  Due to HIGH CALL volume, it would be easier if you could please send faxed requests to expedite your requests and in part, help us provide discharge notifications faster     ==================================================================    Initial Clinical Review    Admission: Date/Time/Statement: 10/31/17 @ 2243  Orders Placed This Encounter   Procedures    Inpatient Admission (expected length of stay for this patient is greater than two midnights)     Standing Status:   Standing     Number of Occurrences:   1     Order Specific Question:   Admitting Physician     Answer:   Carol Torres     Order Specific Question:   Level of Care     Answer:   Med Surg [16]     Order Specific Question:   Estimated length of stay     Answer:   More than 2 Midnights     Order Specific Question:   Certification     Answer:   I certify that inpatient services are medically necessary for this patient for a duration of greater than two midnights  See H&P and MD Progress Notes for additional information about the patient's course of treatment  ED: Date/Time/Mode of Arrival:   ED Arrival Information     Expected Arrival Acuity Means of Arrival Escorted By Service Admission Type    - 10/31/2017 19:43 Emergent Wheelchair Self General Medicine Emergency    Arrival Complaint    SOB;  Hx of COPD and Heart Problems        Chief Complaint:   Chief Complaint   Patient presents with    Chest Pain     pt reports being short of breath on exertion for "quite some time" reports chest pain started three days ago    Shortness of Breath     History of Illness:   Juan Pacheco  is a 81 y o  male with history of CAD,s/p stents,HTN,DM,dHF,COPD, chronic hypoxic respiratory failure , oxygen dependent ,  obesity who presented to the emergency room with right-sided pressure-like nonradiating chest pain started at rest today and progressively getting worse WINTERS X 6-7 days  Patient has dry cough denies sputum production fever chills orthopnea nausea vomiting diarrhea  He denies getting worse bilateral chronic lower extremity edema and venous stasis changes  ED Vital Signs:   ED Triage Vitals   Temperature Pulse Respirations Blood Pressure SpO2   10/31/17 1951 10/31/17 1951 10/31/17 1951 10/31/17 1951 10/31/17 1951   97 7 °F (36 5 °C) (!) 109 (!) 28 (!) 192/83 95 %      Temp Source Heart Rate Source Patient Position - Orthostatic VS BP Location FiO2 (%)   10/31/17 1951 10/31/17 2051 10/31/17 2051 10/31/17 2051 --   Tympanic Monitor Sitting Right arm       Pain Score       10/31/17 2109       5        Wt Readings from Last 1 Encounters:   10/31/17 126 kg (276 lb 14 4 oz)   O 2 on @ 3L via NC    Abnormal Labs/Diagnostic Test Results:   WBC Thousand/uL 11 65*   HEMOGLOBIN g/dL 10 8*   HEMATOCRIT % 37 0   PLATELETS Thousands/uL 221     SODIUM mmol/L 139   POTASSIUM mmol/L 4 3   CHLORIDE mmol/L 102   CO2 mmol/L 33*   BUN mg/dL 17   CREATININE mg/dL 1 15   CALCIUM mg/dL 8 5   TOTAL PROTEIN g/dL 7 9   BILIRUBIN TOTAL mg/dL 0 19*   ALK PHOS U/L 84   ALT U/L 30   AST U/L 34   GLUCOSE RANDOM mg/dL 73   Trop 0 00    Chest X: Hyperinflation consistent with underlying COPD  Possible small right basilar effusion      EC, Sinus with RBBB    ED Treatment:   Medication Administration from 10/31/2017 1943 to 10/31/2017 2330    Date/Time Order Dose Route Action Action by Comments   10/31/2017 2025 nitroglycerin (NITROSTAT) SL tablet 0 4 mg 0 4 mg Sublingual Given Suze Lombardi RN    10/31/2017 2024 aspirin chewable tablet 324 mg 324 mg Oral Given Suze Lombardi RN    10/31/2017 2030 albuterol (2 5 mg/3 mL) 0 083 % inhalation solution **AcuDose Override Pull** 5 mg  Given Darleene Hoe, RT    10/31/2017 2118 albuterol inhalation solution 10 mg 10 mg Nebulization Given Darleene Hoe, RT    10/31/2017 2118 ipratropium (ATROVENT) 0 02 % inhalation solution 1 mg 1 mg Nebulization Given Darleene Hanse, RT    10/31/2017 2118 sodium chloride 0 9 % inhalation solution **AcuDose Override Pull** 9 mL  Given Darleene Hoe, RT    10/31/2017 2309 furosemide (LASIX) injection 40 mg 40 mg Intravenous Given Ananbelle Jamison RN        Past Medical/Surgical History:    Active Ambulatory Problems     Diagnosis Date Noted    Coronary artery disease involving native coronary artery without angina pectoris 01/20/2016    Gastroesophageal reflux disease without esophagitis 01/20/2016    Obesity (BMI 30-39 9) 01/20/2016    Dyslipidemia 01/20/2016    H/O prostate cancer 01/20/2016    Back pain 11/12/2016    Type 2 diabetes mellitus with hyperglycemia ([facility] ) 11/13/2016    Venous stasis dermatitis of both lower extremities 11/15/2016    Opioid dependence ([facility] ) 11/17/2016    Chronic respiratory failure with hypoxia (HCC) 07/23/2017    COPD (chronic obstructive pulmonary disease) ([facility] ) 07/23/2017    Bilateral lower extremity edema 07/24/2017    Hyponatremia 07/24/2017    CKD (chronic kidney disease) 07/24/2017    Duodenitis 09/28/2017     Resolved Ambulatory Problems     Diagnosis Date Noted    Chest pain 01/20/2016    COPD exacerbation ([facility] ) 01/20/2016    Dizziness 11/12/2016    Diabetic poly[facility] with [facility] 11/15/2016    Leukocytosis 07/17/2017    Partial gastric outlet obstruction 09/28/2017    Hypernatremia 10/01/2017    Acute duodenal ulcer with bleeding 10/02/2017    Acute blood loss anemia 10/02/2017     Past Medical History:   Diagnosis Date    Abdominal pain     Cardiac disease     COPD (chronic obstructive pulmonary disease) (HCC)     Coronary artery disease     Diabetes mellitus ([place] [address] )     Hyperlipidemia     Hypertension     MI (myocardial infarction)     Prostate cancer ([place] [address] )        Admitting Diagnosis: Leukocytosis [D72 829]  Dyspnea [R06 00]  SOB (shortness of breath) [R06 02]  Pleural effusion [J90]  Congestive heart failure (HCC) [I50 9]  Acute hypoxemic respiratory failure ([place] [address] ) [J96 01]    Age/Sex: 81 y o  male    Assessment/Plan:     SOB (shortness of breath)  Active Problems:    Coronary artery disease involving native coronary artery without angina pectoris    Gastroesophageal reflux disease without esophagitis    Obesity (BMI 30-39  9)    Dyslipidemia    H/O prostate cancer    Type 2 diabetes mellitus with hyperglycemia (HCC)    Venous stasis dermatitis of both lower extremities    Opioid dependence (HCC)    Chronic respiratory failure with hypoxia (HCC)    COPD (chronic obstructive pulmonary disease) (HCC)    Bilateral lower extremity edema    CKD (chronic kidney disease)    Pleural effusion on right    Accelerated hypertension        Plan for the Primary Problem(s):  · Dyspnea multifactorial secondary to right-sided pleural effusion, COPD with chronic hypoxic respiratory failure ,obesity, accelerated hypertension and acute on chronic diastolic heart failure  ? Patient will be admitted to telemetry proceed with serial cardiac enzymes, continue IV Lasix last time he was admitted for GI bleed and was diuresed upon discharge secondary to found iatrogenic  fluid overload   proBNP slightly  elevated ,patient has chronic bilateral lower extremity edema, unclear compliance/ or  use of CPAP    Monitor  electrolytes and renal function  Monitor daily weight intake and output  Echocardiogram done on September 17 reported ejection fraction to be 71%, grade 1 diastolic dysfunction   Continue respiratory protocol nebs Symbicort supplemental oxygen  Monitor off antibiotic given patient denies increase of sputum production or fever  Tight blood pressure control   given a right-sided pleural effusion patient will be evaluated by pulmonary specialist      Plan for Additional Problems:   · Accelerated hypertension continue home Rx, compliance discussed  · Opioid dependence continue home Devin  · Diabetes   Accu-Cheks, insulin sliding scale  · Chronic respiratory failure with hypoxia, continue oxygen supplement  · Obesity with most likely obstructive sleep apnea, patient does not use CPAP or BiPAP pulmonary evaluation  ·  Bilateral lower extremity edema chronic with venous stasis changes monitor off antibiotic  Continue Lasix IV  · CKD stage 3 monitor renal function given patient will be on IV antibiotic  · Hyperlipidemia continue statin     VTE Prophylaxis: Enoxaparin (Lovenox)  / nicolette sequential compression device   Anticipated Length of Stay:  Patient will be admitted on an Inpatient basis with an anticipated length of stay of  > 2 midnights     Justification for Hospital Stay:  Pulmonary evaluation    Admission Orders:  Scheduled Meds:   aspirin 81 mg Oral Daily   budesonide-formoterol 2 puff Inhalation BID   cyclobenzaprine 10 mg Oral HS   docusate sodium 100 mg Oral BID   enoxaparin 40 mg Subcutaneous Daily   ferrous sulfate 325 mg Oral Daily With Breakfast   furosemide 40 mg Intravenous Daily   gabapentin 300 mg Oral HS   insulin lispro 2-12 Units Subcutaneous TID AC   insulin lispro 2-12 Units Subcutaneous HS   ipratropium 0 5 mg Nebulization TID   levalbuterol 1 25 mg Nebulization TID   metoprolol tartrate 25 mg Oral BID   pantoprazole 40 mg Oral BID AC   potassium chloride 10 mEq Oral Daily   pravastatin 80 mg Oral Daily With Dinner   senna 1 tablet Oral Daily Continuous Infusions:    PRN Meds:   acetaminophen    albuterol    ondansetron    oxyCODONE-acetaminophen    Accuchecks  Consult PT  TELM  O2 @ 3L via NC

## 2017-11-02 VITALS
TEMPERATURE: 98 F | RESPIRATION RATE: 18 BRPM | WEIGHT: 272.49 LBS | DIASTOLIC BLOOD PRESSURE: 58 MMHG | OXYGEN SATURATION: 93 % | HEART RATE: 89 BPM | HEIGHT: 72 IN | SYSTOLIC BLOOD PRESSURE: 125 MMHG | BODY MASS INDEX: 36.91 KG/M2

## 2017-11-02 PROBLEM — I10 ACCELERATED HYPERTENSION: Status: RESOLVED | Noted: 2017-10-31 | Resolved: 2017-11-02

## 2017-11-02 PROBLEM — J90 PLEURAL EFFUSION ON RIGHT: Status: RESOLVED | Noted: 2017-10-31 | Resolved: 2017-11-02

## 2017-11-02 PROBLEM — R06.02 SOB (SHORTNESS OF BREATH): Status: RESOLVED | Noted: 2017-10-31 | Resolved: 2017-11-02

## 2017-11-02 LAB
GLUCOSE SERPL-MCNC: 218 MG/DL (ref 65–140)
GLUCOSE SERPL-MCNC: 246 MG/DL (ref 65–140)
INR PPP: 1.04 (ref 0.86–1.16)
PROTHROMBIN TIME: 13.6 SECONDS (ref 12.1–14.4)

## 2017-11-02 PROCEDURE — 94760 N-INVAS EAR/PLS OXIMETRY 1: CPT

## 2017-11-02 PROCEDURE — 82948 REAGENT STRIP/BLOOD GLUCOSE: CPT

## 2017-11-02 PROCEDURE — 94640 AIRWAY INHALATION TREATMENT: CPT

## 2017-11-02 PROCEDURE — 97112 NEUROMUSCULAR REEDUCATION: CPT

## 2017-11-02 PROCEDURE — 97116 GAIT TRAINING THERAPY: CPT

## 2017-11-02 PROCEDURE — 85610 PROTHROMBIN TIME: CPT | Performed by: HOSPITALIST

## 2017-11-02 PROCEDURE — 97110 THERAPEUTIC EXERCISES: CPT

## 2017-11-02 RX ORDER — LEVALBUTEROL 1.25 MG/.5ML
1.25 SOLUTION, CONCENTRATE RESPIRATORY (INHALATION)
Status: DISCONTINUED | OUTPATIENT
Start: 2017-11-02 | End: 2017-11-02 | Stop reason: HOSPADM

## 2017-11-02 RX ORDER — FLUTICASONE PROPIONATE 50 MCG
1 SPRAY, SUSPENSION (ML) NASAL DAILY
Status: DISCONTINUED | OUTPATIENT
Start: 2017-11-02 | End: 2017-11-02 | Stop reason: HOSPADM

## 2017-11-02 RX ORDER — FLUTICASONE PROPIONATE 50 MCG
1 SPRAY, SUSPENSION (ML) NASAL DAILY
Qty: 16 G | Refills: 0 | Status: SHIPPED | OUTPATIENT
Start: 2017-11-02 | End: 2018-02-01

## 2017-11-02 RX ADMIN — INSULIN LISPRO 4 UNITS: 100 INJECTION, SOLUTION INTRAVENOUS; SUBCUTANEOUS at 11:44

## 2017-11-02 RX ADMIN — INSULIN LISPRO 4 UNITS: 100 INJECTION, SOLUTION INTRAVENOUS; SUBCUTANEOUS at 06:32

## 2017-11-02 RX ADMIN — OXYCODONE HYDROCHLORIDE AND ACETAMINOPHEN 1.5 TABLET: 5; 325 TABLET ORAL at 08:39

## 2017-11-02 RX ADMIN — METOPROLOL TARTRATE 25 MG: 25 TABLET ORAL at 08:30

## 2017-11-02 RX ADMIN — POTASSIUM CHLORIDE 10 MEQ: 750 TABLET, EXTENDED RELEASE ORAL at 08:30

## 2017-11-02 RX ADMIN — PANTOPRAZOLE SODIUM 40 MG: 40 TABLET, DELAYED RELEASE ORAL at 06:28

## 2017-11-02 RX ADMIN — IPRATROPIUM BROMIDE 0.5 MG: 0.5 SOLUTION RESPIRATORY (INHALATION) at 14:53

## 2017-11-02 RX ADMIN — FUROSEMIDE 40 MG: 10 INJECTION, SOLUTION INTRAMUSCULAR; INTRAVENOUS at 08:30

## 2017-11-02 RX ADMIN — ENOXAPARIN SODIUM 40 MG: 40 INJECTION SUBCUTANEOUS at 08:30

## 2017-11-02 RX ADMIN — BUDESONIDE AND FORMOTEROL FUMARATE DIHYDRATE 2 PUFF: 160; 4.5 AEROSOL RESPIRATORY (INHALATION) at 08:31

## 2017-11-02 RX ADMIN — ASPIRIN 81 MG: 81 TABLET, COATED ORAL at 08:30

## 2017-11-02 RX ADMIN — IPRATROPIUM BROMIDE 0.5 MG: 0.5 SOLUTION RESPIRATORY (INHALATION) at 08:12

## 2017-11-02 RX ADMIN — LEVALBUTEROL HYDROCHLORIDE 1.25 MG: 1.25 SOLUTION, CONCENTRATE RESPIRATORY (INHALATION) at 14:53

## 2017-11-02 RX ADMIN — Medication 325 MG: at 08:30

## 2017-11-02 RX ADMIN — LEVALBUTEROL HYDROCHLORIDE 1.25 MG: 1.25 SOLUTION, CONCENTRATE RESPIRATORY (INHALATION) at 08:18

## 2017-11-02 RX ADMIN — FLUTICASONE PROPIONATE 1 SPRAY: 50 SPRAY, METERED NASAL at 14:20

## 2017-11-02 RX ADMIN — DOCUSATE SODIUM 100 MG: 100 CAPSULE, LIQUID FILLED ORAL at 08:30

## 2017-11-02 RX ADMIN — SENNOSIDES 8.6 MG: 8.6 TABLET, FILM COATED ORAL at 08:30

## 2017-11-02 NOTE — CONSULTS
Consultation - Cardiology Team One  Hitesh Padilla Sr  81 y o  male MRN: [id number]  Unit/Bed#: [facility] 429-01 Encounter: [id number]    Inpatient consult to Cardiology  Consult performed by: Alfredo Tate ordered by: Francene Heimlich        Chief Complaint   Patient presents with    Chest Pain       pt reports being short of breath on exertion for "quite some time" reports chest pain started three days ago    Shortness of Breath         Physician Requesting Consult: Saintclair Curling, MD  Reason for Consult / Principal Problem: WINTERS and chest pain    History of Present Illness   HPI: Hitesh Padilla Sr  is a 81y o  year old male who has a history of coronary artery disease,chronic hypoxemic respiratory failure on chronic oxygen  morbid obesity(BMI 37), diabetes, & HTN  He has prior multivessel stenting; the last angiography in 2015 showed nonobstructive disease  His LV function has been preserved  He has followed closely with pulmonology  He is on oxygen around the clock  Annelise Sole He  follows with cardiologist Dr Marlen Mcgowan  The last visit was in December 2016  Notably, he had an admission at the end of September this year and underwent an EGD which disclosed multiple gastric and duodenal ulcers which were clipped and treated with epinephrine  Related inflammation was thought to cause a partial gastric outlet obstruction  His hemoglobin was around 9  He was treated with a proton pump inhibitor twice daily asked to follow up in the GI office  He presented with dyspnea on exertion and right-sided chest pressure  He notes his discomfort was not like when he had prior GI bleeding  It was constant for a couple days particularly the worsening when he tried to take a deep breath  Reports compliance with his meds  He admits to a high sodium diet however   Upon arrival his blood pressure was 192/83  He was tachypneic with rate of 28  His O2 sat was 95% on supplemental oxygen    His EKG was unchanged; sinus tachycardia at 105 beats per minute with a right bundle branch block  Serial troponins have been unremarkable   He underwent chest imaging with a chest x-ray and CTA  No pulmonary emboli was noted  He does have a small to moderate right-sided pleural effusion, increased from a CXR a month ago  He was seen by pulmonology yesterday pm His dyspnea is felt to be multifactorial, secondary to chronic lung and heart disease  He was placed on Lasix 40 mg IV daily  A thoracentesis was not felt to be indicated       Vanessa cross his breathing is back to normal yet thinks    His last stress test, Lexiscan Myoview was performed in 2009 which predated his last angiography  He believes he may have had a stress test within the last year at [facility]   Per the patient he was told that was okay; he has not had repeat angiography    Social history:  History heavy tobacco use, 2 packs per day for total of 56 years  Review of Systems   Constitution: Negative for chills, fever and malaise/fatigue  Cardiovascular: Positive for dyspnea on exertion and leg swelling  Negative for claudication, irregular heartbeat, near-syncope, orthopnea, palpitations, paroxysmal nocturnal dyspnea and syncope  See history of present illness   Respiratory: Positive for shortness of breath and sleep disturbances due to breathing  Negative for cough  Gastrointestinal: Negative for anorexia, nausea and vomiting  Chronically dark stools; on iron   Neurological: Negative for dizziness, focal weakness, headaches and light-headedness  All other systems reviewed and are negative      Historical Information   Past Medical History:   Diagnosis Date    Abdominal pain     Cardiac disease     COPD [facility] obstructive [facility]) [facility] [address] [facility]     Coronary artery disease     Diabetes mellitus ([facility] )     Hyperlipidemia     Hypertension     MI (myocardial infarction)     with 3 stents    Prostate cancer [facility])      Past Surgical History: Procedure Laterality Date    ABDOMINAL SURGERY      exploratory    ANGIOPLASTY      3 stents    ESOPHAGOGASTRODUODENOSCOPY N/A 10/2/2017    Procedure: ESOPHAGOGASTRODUODENOSCOPY (EGD); Surgeon: Blanquita Pereira MD;  Location: [facility];   Service: Gastroenterology    PROSTATE SURGERY       History   Alcohol Use No     History   Drug Use No     History   Smoking Status    Former Smoker    Packs/day: 1 50    Years: 50 00    Quit date: 9/13/2017   Smokeless Tobacco    Never Used     Family History:   Family History   Problem Relation Age of Onset    Heart disease Father        Meds/Allergies   all current active meds have been reviewed, current meds:   Current Facility-Administered Medications   Medication Dose Route Frequency    acetaminophen (TYLENOL) tablet 650 mg  650 mg Oral Q6H PRN    albuterol inhalation solution 2 5 mg  2 5 mg Nebulization Q4H PRN    aspirin (ECOTRIN LOW STRENGTH) EC tablet 81 mg  81 mg Oral Daily    budesonide-formoterol (SYMBICORT) 160-4 5 mcg/act inhaler 2 puff  2 puff Inhalation BID    cyclobenzaprine (FLEXERIL) tablet 10 mg  10 mg Oral HS    docusate sodium (COLACE) capsule 100 mg  100 mg Oral BID    enoxaparin (LOVENOX) subcutaneous injection 40 mg  40 mg Subcutaneous Daily    ferrous sulfate tablet 325 mg  325 mg Oral Daily With Breakfast    furosemide (LASIX) injection 40 mg  40 mg Intravenous Daily    gabapentin (NEURONTIN) capsule 300 mg  300 mg Oral HS    insulin lispro (HumaLOG) 100 units/mL subcutaneous injection 2-12 Units  2-12 Units Subcutaneous TID AC    insulin lispro (HumaLOG) 100 units/mL subcutaneous injection 2-12 Units  2-12 Units Subcutaneous HS    ipratropium (ATROVENT) 0 02 % inhalation solution 0 5 mg  0 5 mg Nebulization TID    levalbuterol (XOPENEX) inhalation solution 1 25 mg  1 25 mg Nebulization TID    metoprolol tartrate (LOPRESSOR) tablet 25 mg  25 mg Oral BID    ondansetron (ZOFRAN) injection 4 mg  4 mg Intravenous Q6H PRN    oxyCODONE-acetaminophen (PERCOCET) 5-325 mg per tablet 1 5 tablet  1 5 tablet Oral Q6H PRN    pantoprazole (PROTONIX) EC tablet 40 mg  40 mg Oral BID AC    potassium chloride (K-DUR,KLOR-CON) CR tablet 10 mEq  10 mEq Oral Daily    pravastatin (PRAVACHOL) tablet 80 mg  80 mg Oral Daily With Dinner    senna (SENOKOT) tablet 8 6 mg  1 tablet Oral Daily    and PTA meds:   Prior to Admission Medications   Prescriptions Last Dose Informant Patient Reported? Taking?   acetaminophen (TYLENOL) 325 mg tablet Unknown at Unknown time  No No   Sig: Take 3 tablets by mouth every 8 (eight) hours   albuterol (2 5 mg/3 mL) 0 083 % nebulizer solution 11/1/2017 at Unknown time  Yes Yes   Sig: Take 2 5 mg by nebulization Indications: 2-3 times a day PRN     aspirin (ECOTRIN LOW STRENGTH) 81 mg EC tablet 10/31/2017 at Unknown time  Yes Yes   Sig: Take 81 mg by mouth daily   budesonide-formoterol (SYMBICORT) 160-4 5 mcg/act inhaler 10/31/2017 at Unknown time  Yes Yes   Sig: Inhale 2 puffs 2 (two) times a day   cyclobenzaprine (FLEXERIL) 10 mg tablet 10/31/2017 at Unknown time  No Yes   Sig: Take 1 tablet by mouth daily at bedtime   ferrous sulfate 325 (65 Fe) mg tablet 10/31/2017 at Unknown time  Yes Yes   Sig: Take 325 mg by mouth daily with breakfast   furosemide (LASIX) 40 mg tablet 10/31/2017 at Unknown time  No Yes   Sig: Take 1 tablet by mouth daily   gabapentin (NEURONTIN) 300 mg capsule Past Week at Unknown time  Yes Yes   Sig: Take 300 mg by mouth daily at bedtime     insulin regular (HumuLIN R U-500) 500 units/mL CONCENTRATED injection 10/31/2017 at Unknown time  No Yes   Sig: Inject 0 11 mL under the skin daily before breakfast   insulin regular (HumuLIN R U-500) 500 units/mL CONCENTRATED injection 10/31/2017 at Unknown time  No Yes   Sig: Inject 0 07 mL under the skin daily before dinner   metoprolol tartrate (LOPRESSOR) 25 mg tablet 10/31/2017 at Unknown time  Yes Yes   Sig: Take 25 mg by mouth 2 (two) times a day oxyCODONE-acetaminophen (PERCOCET) 7 5-325 MG per tablet 10/31/2017 at Unknown time  Yes Yes   Sig: Take 1 tablet by mouth 2 (two) times a day  pantoprazole (PROTONIX) 40 mg tablet 10/31/2017 at Unknown time  No Yes   Sig: Take 1 tablet by mouth 2 (two) times a day before meals   potassium chloride (K-DUR,KLOR-CON) 10 mEq tablet 10/31/2017 at Unknown time  No Yes   Sig: Take 1 tablet by mouth daily   simvastatin (ZOCOR) 40 mg tablet 10/31/2017 at Unknown time  Yes Yes   Sig: Take 40 mg by mouth daily at bedtime  tiotropium (SPIRIVA) 18 mcg inhalation capsule 10/31/2017 at Unknown time  Yes Yes   Sig: Place 18 mcg into inhaler and inhale daily      Facility-Administered Medications: None          Allergies   Allergen Reactions    Metformin        Objective   Vitals: Blood pressure 152/67, pulse 95, temperature 97 9 °F (36 6 °C), temperature source Oral, resp  rate 18, height 6' (1 829 m), weight 124 kg (272 lb 7 8 oz), SpO2 93 %  ,     Body mass index is 36 96 kg/m²  ,     Systolic (26GNC), SOH:188 , Min:111 , XFV:619     Diastolic (21VWD), HJX:28, Min:58, Max:72        Intake/Output Summary (Last 24 hours) at 11/02/17 0831  Last data filed at 11/02/17 0407   Gross per 24 hour   Intake             1342 ml   Output             3750 ml   Net            -2408 ml     Weight (last 2 days)     Date/Time   Weight    11/01/17 0600  124 (272 49)    10/31/17 2313  126 (276 9)    10/31/17 1951  127 (279)            Invasive Devices     Peripheral Intravenous Line            Peripheral IV 10/31/17 Right Wrist 1 day                  Physical Exam   Constitutional: He is oriented to person, place, and time  No distress  HENT:   Head: Normocephalic and atraumatic  Eyes: Conjunctivae and EOM are normal    Neck: Normal range of motion  Neck supple  Cardiovascular: Normal rate, regular rhythm and intact distal pulses  Exam reveals distant heart sounds      Pulmonary/Chest: Effort normal and breath sounds normal  Abdominal: Soft  Bowel sounds are normal    Musculoskeletal: He exhibits edema  Mild lower extremity edema  Chronic venous stasis changes, bilaterally   Neurological: He is alert and oriented to person, place, and time  Skin: Skin is warm and dry  He is not diaphoretic  Psychiatric: He has a normal mood and affect  Nursing note and vitals reviewed          LABORATORY RESULTS:    Results from last 7 days  Lab Units 11/01/17 0440 11/01/17  0108   TROPONIN I ng/mL <0 02 <0 02     CBC with diff:   Results from last 7 days  Lab Units 11/01/17  0440 10/31/17  2010   WBC Thousand/uL 10 08 11 65*   HEMOGLOBIN g/dL 9 7* 10 8*   HEMATOCRIT % 34 1* 37 0   MCV fL 81* 80*   PLATELETS Thousands/uL 182  182 221   MCH pg 23 0* 23 2*   MCHC g/dL 28 4* 29 2*   RDW % 20 1* 20 0*   MPV fL 9 5  9 7 9 9   NRBC AUTO /100 WBCs  --  0       CMP:  Results from last 7 days  Lab Units 11/01/17  0440 10/31/17  2010   SODIUM mmol/L 138 139   POTASSIUM mmol/L 3 8 4 3   CHLORIDE mmol/L 99* 102   CO2 mmol/L 34* 33*   ANION GAP mmol/L 5 4   BUN mg/dL 18 17   CREATININE mg/dL 1 23 1 15   GLUCOSE RANDOM mg/dL 125 73   CALCIUM mg/dL 8 3 8 5   AST U/L  --  34   ALT U/L  --  30   ALK PHOS U/L  --  84   TOTAL PROTEIN g/dL  --  7 9   ALBUMIN g/dL  --  2 9*   BILIRUBIN TOTAL mg/dL  --  0 19*   EGFR ml/min/1 73sq m 59 64       BMP:  Results from last 7 days  Lab Units 11/01/17  0440 10/31/17  2010   SODIUM mmol/L 138 139   POTASSIUM mmol/L 3 8 4 3   CHLORIDE mmol/L 99* 102   CO2 mmol/L 34* 33*   BUN mg/dL 18 17   CREATININE mg/dL 1 23 1 15   GLUCOSE RANDOM mg/dL 125 73   CALCIUM mg/dL 8 3 8 5         Results from last 7 days  Lab Units 10/31/17  2010   NT-PRO BNP pg/mL 334*         Results from last 7 days  Lab Units 11/01/17 0440   HEMOGLOBIN A1C % 6 8*       Results from last 7 days  Lab Units 11/02/17  0545   INR  1 04     Lipid Profile:   Lab Results   Component Value Date    CHOL 147 08/04/2017    CHOL 150 03/21/2017    CHOL 163 12/20/2016 Lab Results   Component Value Date    HDL 63 (H) 2017    HDL 53 2017    HDL 96 (H) 2016     Lab Results   Component Value Date    LDLCALC 58 2017    LDLCALC 67 2017    LDLCALC 39 2016     Lab Results   Component Value Date    TRIG 132 2017    TRIG 150 2017    TRIG 140 2016         Cardiac testing:   Results for orders placed during the hospital encounter of 17   Echo complete with contrast if indicated    Narrative [address]  [address] [place] [address]  [phone]    Transthoracic Echocardiogram  2D, M-mode, Doppler, and Color Doppler    Study date:  29-Sep-2017    Patient: Ruth Peña  MR number: [id number]  Account number: [de-identified]  : 1945  Age: 81 years  Gender: Male  Status: Inpatient  Location:  Height: 72 in  Weight: 280 lb  BP: 127/ 67 mmHg    Indications: Shortness of breath  Diagnoses: R06 00 - Dyspnea, unspecified    Sonographer:  JOLIE Guajardo  Primary Physician:  Kamran Burnett MD  Referring Physician:  Jett Antunez MD  Group:  [facility]  Interpreting Physician:  Mildred Neely MD    SUMMARY    PROCEDURE INFORMATION:  This was a technically difficult study  LEFT VENTRICLE:  Systolic function was vigorous by visual assessment  Ejection fraction was estimated to be 65 %  This study was inadequate for the evaluation of regional wall motion  Doppler parameters were consistent with abnormal left ventricular relaxation (grade 1 diastolic dysfunction)  RIGHT VENTRICLE:  The size was at the upper limits of normal   Systolic function was grossly normal     LEFT ATRIUM:  The atrium was mildly dilated  RIGHT ATRIUM:  The atrium was mildly dilated  HISTORY: PRIOR HISTORY: Dyspnea, CAD s/p MI and stents, DM2, COPD, Dyslipidemia, Obesity    PROCEDURE: This was a routine study  The transthoracic approach was used   The study included complete 2D imaging, M-mode, complete spectral Doppler, and color Doppler  The heart rate was 84 bpm, at the start of the study  This was a  technically difficult study  LEFT VENTRICLE: Size was normal  Systolic function was vigorous by visual assessment  Ejection fraction was estimated to be 65 %  This study was inadequate for the evaluation of regional wall motion  Wall thickness was normal  DOPPLER:  Doppler parameters were consistent with abnormal left ventricular relaxation (grade 1 diastolic dysfunction)  RIGHT VENTRICLE: The size was at the upper limits of normal  Systolic function was grossly normal  Wall thickness was normal     LEFT ATRIUM: The atrium was mildly dilated  RIGHT ATRIUM: The atrium was mildly dilated  MITRAL VALVE: Valve structure was normal  There was normal leaflet separation  DOPPLER: The transmitral velocity was within the normal range  There was no evidence for stenosis  There was no significant regurgitation  AORTIC VALVE: The valve was trileaflet  Leaflets exhibited normal thickness and normal cuspal separation  DOPPLER: Transaortic velocity was within the normal range  There was no evidence for stenosis  There was no significant  regurgitation  TRICUSPID VALVE: The valve structure was normal  There was normal leaflet separation  DOPPLER: The transtricuspid velocity was within the normal range  There was no evidence for stenosis  There was no significant regurgitation  PULMONIC VALVE: Leaflets exhibited normal thickness, no calcification, and normal cuspal separation  DOPPLER: The transpulmonic velocity was within the normal range  There was no significant regurgitation  PERICARDIUM: There was no pericardial effusion  The pericardium was normal in appearance  AORTA: The root exhibited normal size  SYSTEMIC VEINS: IVC: The inferior vena cava was normal in size      SYSTEM MEASUREMENT TABLES    2D  LA Area: 22 73 cm2  LVEDV MOD A4C: 94 25 ml  LVEF MOD A4C: 65 25 %  LVESV MOD A4C: 32 75 ml  LVLd A4C: 8 94 cm  LVLs A4C: 7 21 cm  RA Area: 18 72 cm2  RVIDd: 3 52 cm  SV MOD A4C: 61 5 ml    MM  TAPSE: 2 06 cm    PW  E': 0 14 m/s  E/E': 5 08  MV A Jose: 1 03 m/s  MV Dec [place]: 4 09 m/s2  MV DecT: 177 69 ms  MV E Jose: 0 73 m/s  MV E/A Ratio: 0 71  MV PHT: 51 53 ms  MVA By PHT: 4 27 cm2    [employer] Accredited Echocardiography Laboratory    Prepared and electronically signed by    Torri Merrill MD  Signed 30-Sep-2017 14:10:59           Imaging: I have personally reviewed pertinent reports  and I have personally reviewed pertinent films in PACS  Xr Chest 1 View Portable  Result Date: 11/1/2017  Narrative: CHEST INDICATION:  Shortness of breath COMPARISON:  October 4, 2017 VIEWS:   AP frontal IMAGES:  1 FINDINGS:  Lungs are adequately aerated  Small right effusion, increased from prior exam   Right basilar atelectasis  Cardiac size within normal limits  Atherosclerotic aorta  No vascular congestion  Minimal peribronchial thickening  No pneumothorax or free air  Visualized osseous structures appear within normal limits for the patient's age  Impression: Limited portable exam  Increasing small right pleural effusion with increasing right basilar atelectasis  Slight peribronchial thickening which may suggest minimal interstitial edema  Workstation performed: [id number]     Xr Chest Pa & Lateral  Result Date: 10/4/2017  Narrative: CHEST INDICATION:  Fever  COMPARISON:  9/28/2017 VIEWS:  Frontal and lateral projections IMAGES:  3 FINDINGS:     Stable cardiomediastinal structures  Hyperinflated lung fields  No congestive failure  No pneumothorax  Flattening of the hemidiaphragms  Blunting of the costophrenic angles  Possible small right basilar effusion  Stable bony structures  Old right-sided rib fractures along the inferior lateral aspect of the chest    Impression: Hyperinflation consistent with underlying COPD  Possible small right basilar effusion   Workstation performed: [id number]     Cta Chest Pe Study  Result Date: 11/1/2017  Narrative: CTA - CHEST WITH IV CONTRAST - PULMONARY ANGIOGRAM INDICATION: Shortness of breath COMPARISON: 7/23/2017 TECHNIQUE: CTA examination of the chest was performed using angiographic technique according to a protocol specifically tailored to evaluate for pulmonary embolism  Reformatted images were created in axial, sagittal, and coronal planes  In addition, coronal 3D MIP postprocessing was performed on the acquisition scanner  Radiation dose length product (DLP) for this visit:  774 31 mGy-cm   This examination, like all CT scans performed in the [facility], was performed utilizing techniques to minimize radiation dose exposure, including the use of iterative  reconstruction and automated exposure control  IV Contrast:  85 mL of iohexol (OMNIPAQUE)      FINDINGS: PULMONARY ARTERIAL TREE:  No pulmonary embolus is seen  LUNGS:  There is patchy opacity in the right lower lobe  There are small lung nodules in the lingula and left lower lobe which have been stable for at least 2 years  PLEURA:  There is a small to moderate right pleural effusion  HEART/AORTA:  Unremarkable for patient's age  MEDIASTINUM AND JEREMÍAS:  Unremarkable  CHEST WALL AND LOWER NECK:       Normal  VISUALIZED STRUCTURES IN THE UPPER ABDOMEN:  Unremarkable  OSSEOUS STRUCTURES:  No acute fracture or destructive osseous lesion  Impression  1  No acute pulmonary embolism  2   Small to moderate right pleural effusion with overlying patchy opacity, atelectasis versus pneumonia  3   Stable small lung nodules  Workstation performed: [id number]       EKG reviewed personally:   Sinus tachycardia 105 beats per minute    Right bundle branch block was present seen previously      Telemetry reviewed personally:  Sinus rhythm, average 80 to 90    Assessment  Principal Problem:    SOB (shortness of breath)  Active Problems:    COPD (chronic obstructive pulmonary disease) ([facility] [address] )    Accelerated hypertension    Coronary artery disease involving native coronary artery without angina pectoris    Gastroesophageal reflux disease without esophagitis    Obesity (BMI 30-39  9)    Dyslipidemia    H/O prostate cancer    Type 2 diabetes mellitus with hyperglycemia (HCC)    Venous stasis dermatitis of both lower extremities    Opioid dependence (HCC)    Chronic respiratory failure with hypoxia (HCC)    Bilateral lower extremity edema    CKD (chronic kidney disease)    Pleural effusion on right      Assessment  Dyspnea on exertion-likely multifactorial due to chronic heart disease and chronic lung disease  Chest pain, atypical   Resolved  Right pleural effusion-on Lasix 40 mg IV daily currently  Net output of 1 7 L  Coronary disease, status post multivessel stenting  On aspirin, a beta-blocker and statin  Preserved LV function  Chronic diastolic heart failure-he appears euvolemic  His BNP is low  As an outpatient on Lasix 40 mg p o  daily  Diabetes mellitus type 2  His last hemoglobin A1c was 6 8  Hyperlipidemia-on pravastatin 80 mg daily here and simvastatin 40 mg daily as an outpatient  Chronic kidney disease stage 3  His baseline creatinine appears to be around 1 2-1 4  Creatinine is at baseline  COPD on chronic oxygen 24/7  Obstructive sleep apnea  Morbid obesity  BMI 37  History gastric and duodenal bleeding ulcers, status post clipping/inj with epi September 2017  His hemoglobin appears stable at 9 7  He is on a proton pump inhibitor twice daily          Thank you for allowing us to participate in this patient's care  This pt will follow up with Dr Masood Montana   once discharged  Counseling / Coordination of Care  Total floor / unit time spent today 45minutes  Greater than 50% of total time was spent with the patient and / or family counseling and / or coordination of care    A description of the counseling / coordination of care: Review of history, current assessment, development of a plan  Code Status: Level 1 - Full Code    ** Please Note: Dragon 360 Dictation voice to text software may have been used in the creation of this document   **

## 2017-11-02 NOTE — PHYSICAL THERAPY NOTE
Physical Therapy Progress Note     11/02/17 1511   Pain Assessment   Pain Assessment No/denies pain   Pain Score No Pain   Restrictions/Precautions   Weight Bearing Precautions Per Order No   Other Precautions O2;Fall Risk   General   Chart Reviewed Yes   Response to Previous Treatment Patient with no complaints from previous session  Family/Caregiver Present No   Cognition   Overall Cognitive Status WFL   Orientation Level Oriented X4   Subjective   Subjective denies pain prior to session; reports that amb will agravate chronic back pain; no other c/o   Bed Mobility   Rolling R 5  Supervision   Rolling L 5  Supervision   Supine to Sit 5  Supervision   Sit to Supine 5  Supervision   Transfers   Sit to Stand 5  Supervision   Stand to Sit 5  Supervision   Stand pivot 5  Supervision   Toilet transfer 5  Supervision   Ambulation/Elevation   Gait pattern Wide OBEY   Gait Assistance 5  Supervision   Assistive Device Rolling walker   Distance 150x2 with S and RW   Balance   Static Sitting Good   Dynamic Sitting Fair +   Static Standing Fair +   Dynamic Standing Fair   Ambulatory Fair   Endurance Deficit   Endurance Deficit Yes   Endurance Deficit Description SOB and back pain with ambuilation;     Activity Tolerance   Activity Tolerance Patient limited by fatigue;Patient limited by pain   Nurse Made Aware yes   Exercises   THR Sitting;Bilateral   TKR Sitting;Bilateral   Assessment   Prognosis Good   Problem List Decreased endurance;Decreased mobility;Pain;Decreased safety awareness   Assessment Pt supine in bed prior to session; able to supine > sit EOB with S and roll L/R with S; STS/SPT with S and RW: amb 150' (x2) with RW and S; after seated rest, instructed pt in seated TE (as above); ambulated 150' S with RW back to room; finished session with xfer stand > sit EOB > supine with S and use of  bed rails; recommend cont PT POC;    Goals   Patient Goals to rest   STG Expiration Date 11/08/17   Plan Treatment/Interventions ADL retraining;Functional transfer training;LE strengthening/ROM; Elevations; Therapeutic exercise; Endurance training;Cognitive reorientation;Patient/family training;Equipment eval/education; Bed mobility;Gait training   Progress Progressing toward goals   PT Frequency 5x/wk   Recommendation   Recommendation Home PT; Home with family support   PT - OK to Discharge Janice Perez, SOLO

## 2017-11-02 NOTE — PROGRESS NOTES
Progress Note - Pulmonary   Ginette Reina Sr  81 y o  male MRN: [id number]  Unit/Bed#: [facility] 429-01 Encounter: [id number]      Assessment:  · Small right-sided pleural effusion  · Dyspnea  Multifactorial   · COPD without acute exacerbation  · Chronic hypoxemic respiratory failure  · Atypical chest pain  Plan:  · Continue bronchodilators  · Oxygen supplement to maintain O2 sat above 88%  · Will sign off  Please call with questions  Subjective: The patient feels the breathing is back to his baseline  Denies any significant cough or sputum production  Denies pain except in his back  Vitals: Blood pressure 125/58, pulse 92, temperature 98 °F (36 7 °C), temperature source Oral, resp  rate 18, height 6' (1 829 m), weight 124 kg (272 lb 7 8 oz), SpO2 91 % ,       Intake/Output Summary (Last 24 hours) at 11/02/17 1305  Last data filed at 11/02/17 1219   Gross per 24 hour   Intake             1640 ml   Output             4250 ml   Net            -2610 ml         Physical Exam  Gen: Awake, alert, oriented x 3, no acute distress  HEENT: Mucous membranes moist, no oral lesions, no thrush  NECK: No accessory muscle use, JVP not elevated  Cardiac: Regular, single S1, single S2, no murmurs, no rubs, no gallops  Lungs:  Decreased breath sounds  No wheezing or rhonchi    Abdomen: normoactive bowel sounds, soft nontender, nondistended, no rebound or rigidity, no guarding  Extremities: no cyanosis, no clubbing, no edema        Armando Gonzales MD

## 2017-11-02 NOTE — PROGRESS NOTES
Patient states he is having trouble urinating  States he was able to urinate twice overnight, but is unable to void this morning although he feels the urge  What is coming out is bright red blood  Dr Christal Tony notified and I suggested an Urology Consult  Will continue to monitor patient

## 2017-11-03 ENCOUNTER — GENERIC CONVERSION - ENCOUNTER (OUTPATIENT)
Dept: OTHER | Facility: OTHER | Age: 72
End: 2017-11-03

## 2017-11-03 NOTE — DISCHARGE SUMMARY
Discharge Summary - [address] Hospitalist Service - Internal Medicine      Patient Information: Elif Love  81 y o  male MRN: [id number]  Unit/Bed#: [facility] 429-01 Encounter: [id number]    Discharging Physician / Practitioner: Kulwinder Lowery MD  PCP: Rafa Yoedr MD  Admission Date: 10/31/2017  Discharge Date: 11/02/17      Reason for Admission:  Shortness of breath    Discharge Diagnoses:     Principal Problem (Resolved):    SOB (shortness of breath)    Active Problems:    Coronary artery disease involving native coronary artery without angina pectoris    Gastroesophageal reflux disease without esophagitis    Obesity (BMI 30-39  9)    Dyslipidemia    H/O prostate cancer    Type 2 diabetes mellitus with hyperglycemia (HCC)    Venous stasis dermatitis of both lower extremities    Opioid dependence (HCC)    Chronic respiratory failure with hypoxia (HCC)    COPD (chronic obstructive pulmonary disease) (HCC)    Bilateral lower extremity edema    CKD (chronic kidney disease)    Chronic Pleural effusion on right  Hypertension        Consultations During Hospital Stay:  · Pulmonology  · Cardiology      Hospital Course:     Elif Love  is a 81 y o  male patient who originally presented to the hospital on 10/31/2017 due to progressively worsening shortness of breath  He does wear 3 L oxygen at home at baseline  He complained that the symptoms started approximately a week prior to his presentation in the hospital   During the hospital course he was seen by both Cardiology and pulmonology  He does have an underlying history of COPD and chronic CHF which were both found to not be in exacerbation at this time  He also has obesity and generalized deconditioning due to the aforementioned reasons  The course of the hospital stay his condition did improve with physical therapy and continued diuresis and breathing treatments    Imaging did reveal presence of a previously known right pleural effusion which only mildly increased in size  Pulmonology opted not to tap this effusion at it was deemed clinically insignificant at this time  Cardiology agreed with continuation of his current CHF regimen  Patient did complain of some congestion for which Flonase was ordered  A CXR was negative for pneumonia  He has been counseled on diet and lifestyle modifications including weight loss  He will follow up as an outpatient with his pulmonologist as well as cardiologist and PCP as instructed  He was discharged home with visiting nursing services  Patient has been told to return to the ER if any symptoms worsen or return  Condition at Discharge: good       Discharge Day Visit / Exam:     Vitals: Blood Pressure: 125/58 (Map 83) (11/02/17 1100)  Pulse: 89 (11/02/17 1453)  Temperature: 98 °F (36 7 °C) (11/02/17 1100)  Temp Source: Oral (11/02/17 1100)  Respirations: 18 (11/02/17 1100)  Height: 6' (182 9 cm) (11/01/17 0006)  Weight - Scale: 124 kg (272 lb 7 8 oz) (11/01/17 0600)  SpO2: 93 % (11/02/17 1453)      Physical exam - I had a face-to-face encounter with the patient on day of discharge  Discussion with Family:  The patient understands and agrees with plan  Discharge instructions/Information to patient and family:   See after visit summary for information provided to patient and family  Provisions for Follow-Up Care:  See after visit summary for information related to follow-up care and any pertinent home health orders  Disposition:     Home with A Services        Discharge Statement:  I spent 36 minutes discharging the patient  This time was spent on the day of discharge  I had direct contact with the patient on the day of discharge  Greater than 50% of the total time was spent examining patient, answering all patient questions, arranging and discussing plan of care with patient as well as directly providing post-discharge instructions    Additional time then spent on discharge activities  Discharge Medications:  See after visit summary for reconciled discharge medications provided to patient and family        ** Please Note: This note has been constructed using a voice recognition system **

## 2017-11-03 NOTE — CASE MANAGEMENT
Notification of Discharge  This is a Notification of Discharge from our facility [address]  Please be advised that this patient has been discharge from our facility  Below you will find the admission and discharge date and time including the patients disposition  PRESENTATION DATE: 10/31/2017  7:55 PM  IP ADMISSION DATE: 10/31/17 2242  DISCHARGE DATE: 11/2/2017  5:13 PM  DISPOSITION: Home with [address] [place] [address] in the [facility] by Onofre Carroll for 2017  Network Utilization Review Department  Phone: [phone]; Fax [phone]  ATTENTION: The Network Utilization Review Department is now centralized for our 7 Facilities  Make a note that we have a new phone and fax numbers for our Department  Please call with any questions or concerns to [phone] and carefully follow the prompts so that you are directed to the right person  All voicemails are confidential  Fax any determinations, approvals, denials, and requests for initial or continue stay review clinical to [phone]  Due to HIGH CALL volume, it would be easier if you could please send faxed requests to expedite your requests and in part, help us provide discharge notifications faster

## 2017-11-09 ENCOUNTER — APPOINTMENT (OUTPATIENT)
Dept: PULMONOLOGY | Age: 72
End: 2017-11-09
Payer: COMMERCIAL

## 2017-11-16 ENCOUNTER — APPOINTMENT (OUTPATIENT)
Dept: PULMONOLOGY | Age: 72
End: 2017-11-16
Payer: COMMERCIAL

## 2017-11-16 PROCEDURE — G0424 PULMONARY REHAB W EXER: HCPCS

## 2017-11-20 ENCOUNTER — ALLSCRIPTS OFFICE VISIT (OUTPATIENT)
Dept: OTHER | Facility: OTHER | Age: 72
End: 2017-11-20

## 2017-11-21 ENCOUNTER — ALLSCRIPTS OFFICE VISIT (OUTPATIENT)
Dept: OTHER | Facility: OTHER | Age: 72
End: 2017-11-21

## 2017-11-21 ENCOUNTER — APPOINTMENT (OUTPATIENT)
Dept: PULMONOLOGY | Age: 72
End: 2017-11-21
Payer: COMMERCIAL

## 2017-11-21 PROCEDURE — G0424 PULMONARY REHAB W EXER: HCPCS

## 2017-11-21 NOTE — PROGRESS NOTES
Assessment  Assessed    1  Cor pulmonale (416 9) (I27 81)   2  Coronary artery disease involving native heart without angina pectoris (414 01) (I25 10)   3  Chronic obstructive pulmonary disease (496) (J44 9)   4  Dyslipidemia (272 4) (E78 5)   5  Essential hypertension (401 9) (I10)    Plan  Essential hypertension    · Follow-up visit in 4 Months Evaluation and Treatment  Follow-up  Status: Hold For -Scheduling  Requested for: 96UTH0105   Ordered; For: Essential hypertension; Ordered By: Mahamed Urbina Performed:  Due: 25MLV3764  Health Maintenance, RBBB (right bundle branch block)    · EKG/ECG- POC; Status:Complete;   Done: 93MKJ4390   Perform: In Office; [phone]; Last Updated Cristal Solis; 11/20/2017 9:47:23 AM;Ordered;Maintenance, RBBB (right bundle branch block); Ordered By:Howard Hammer; Discussion/Summary  Cardiology Discussion Summary Free Text Note Form Kiran Fine:   81year old man with coronary artery disease, COPD, and pulmonary hypertension, recently in the hospital in September and November - mostly due to breathing issues  Does have some pedal edema  No chest pain  Dyspnea - multifactorial  Mostly pulmonary  Coronary artery disease - stable  Hypertension - controlled  Dyslipidemia - on statin  Continue current medications  Follow up in 4 months  Chief Complaint  Chief Complaint Free Text Note Form: Patient is here today for hospital f/u  patient c/o SOB, occ Dizziness and Swelling in LE  EKG today  Chief Complaint Chronic Condition [facility]: Patient is here today for follow up of chronic conditions described in HPI  History of Present Illness  Cardiology HPI Free Text Note Form [facility]: Mr Josesito Griffiths is a 81year old man with coronary artery disease, COPD, and pulmonary hypertension, recently in the hospital in September and November - mostly due to breathing issues  Does have some pedal edema  No chest pain        Review of Systems  Cardiology Male ROS:    Cardiac: has swelling in the    Skin: No complaints of nonhealing sores or skin rash  Genitourinary: No complaints of recurrent urinary tract infections, frequent urination at night, difficult urination, blood in urine, kidney stones, loss of bladder control, no kidney or prostate problems, no erectile dysfunction  Psychological: No complaints of feeling depressed, anxiety, panic attacks, or difficulty concentrating  General: No complaints of trouble sleeping, lack of energy, fatigue, appetite changes, weight changes, fever, frequent infections, or night sweats  Respiratory: shortness of breath  HEENT: No complaints of serious problems, hearing problems, nose problems, throat problems, or snoring  Gastrointestinal: No complaints of liver problems, nausea, vomiting, heartburn, constipation, bloody stools, diarrhea, problems swallowing, adbominal pain, or rectal bleeding  Hematologic: No complaints of bleeding disorders, anemia, blood clots, or excessive brusing  Neurological: No complaints of numbness, tingling, dizziness, weakness, seizures, headaches, syncope or fainting, AM fatigue, daytime sleepiness, no witnessed apnea episodes  Musculoskeletal: No complaints of arthritis, back pain, or painfull swelling  Active Problems  Problems    1  Acute duodenal ulcer with hemorrhage (532 00) (K26 0)   2  Chronic back pain (724 5,338 29) (M54 9,G89 29)   3  Chronic obstructive pulmonary disease (496) (J44 9)   4  Colon cancer screening (V76 51) (Z12 11)   5  Cor pulmonale (416 9) (I27 81)   6  Coronary artery disease involving native heart without angina pectoris (414 01) (I25 10)   7  Current use of insulin (V58 67) (Z79 4)   8  Diabetes mellitus type 2, uncontrolled (250 02) (E11 65)   9  Diabetic neuropathy (250 60,357 2) (E11 40)   10  Dyslipidemia (272 4) (E78 5)   11  Essential hypertension (401 9) (I10)   12  GERD (gastroesophageal reflux disease) (530 81) (K21 9)   13  Gross hematuria (599 71) (R31 0)   14  Hypoglycemia associated with diabetes (250 80) (E11 649)   15  Morbid obesity with BMI of 40 0-44 9, adult (278 01,V85 41) (E66 01,Z68 41)   16  Need for pneumococcal vaccination (V03 82) (Z23)   17  Nicotine dependence (305 1) (F17 200)   18  Obstructive sleep apnea (327 23) (G47 33)   19  Other nonspecific abnormal finding of lung field (793 19) (R91 8)   20  Pleural Effusion (511 9)   21  Prostate cancer (185) (C61)   22  RBBB (right bundle branch block) (426 4) (I45 10)   23  Screening for neurological condition (V80 09) (Z13 89)   24  Vertigo (780 4) (R42)    Past Medical History  Problems    1  History of Acute UTI (599 0) (N39 0)   2  Chronic obstructive pulmonary disease (496) (J44 9)   3  History of Coronary Artery Disease (V12 59)   4  History of appendicitis (V12 79) (Z87 19)   5  History of back pain (V13 59) (Z87 39)   6  History of cancer (V10 90) (Z85 9)   7  History of chronic obstructive lung disease (V12 69) (Z87 09)   8  History of diabetes mellitus (V12 29) (Z86 39)   9  History of erectile dysfunction (V13 89) (Z87 438)   10  History of hematuria (V13 09) (Z87 448)   11  History of hemorrhoids (V13 89) (Z87 19)   12  History of hypertension (V12 59) (Z86 79)   13  History of measles (V12 09) (Z86 19)   14  History of mumps (V12 09) (Z86 19)   15  History of pneumonia (V12 61) (Z87 01)   16  History of varicella (V12 09) (Z86 19)   17  History of Prostate Cancer (V10 46)  Active Problems And Past Medical History Reviewed: The active problems and past medical history were reviewed and updated today  Surgical History  Problems    1  History of Appendectomy   2  History of Diagnostic Cystoscopy   3  History of Prostate Surgery  Surgical History Reviewed: The surgical history was reviewed and updated today  Family History  Mother    1  Family history of cerebrovascular accident (CVA) (V17 1) (Z82 3)   2  Family history of hypertension (V17 49) (Z82 49)   3   Denied: Family history of mental disorder  Father    4  Family history of asthma (V17 5) (Z82 5)   5  Family history of lung disease (V19 8) (Z83 6)   6  Family history of malignant neoplasm (V16 9) (Z80 9)   7  Denied: Family history of mental disorder  Family History Reviewed: The family history was reviewed and updated today  Social History  Problems    · Caffeine use (V49 89) (F15 90)   · Exercises rarely (V49 89) (Z78 9)   · Former smoker (V15 82) (S42 027)   · Lives with family   ·    · Denied: History of Never a smoker   · Never uses seat belt   · No alcohol use   · No living will   · No Caodaism beliefs   · Denied: History of No secondhand smoke exposure   · Denied: History of Recreational drug use   · Retired  Social History Reviewed: The social history was reviewed and updated today  The social history was reviewed and is unchanged  Current Meds   1  Albuterol Sulfate (2 5 MG/3ML) 0 083% Inhalation Nebulization Solution; USE 1 UNIT DOSE EVERY 4-6 HOURS AS NEEDED FOR WHEEZING ; Therapy: 08Apr2014 to (Last Rx:08Apr2014)  Requested for: 08Apr2014 Ordered   2  Albuterol Sulfate (2 5 MG/3ML) 0 083% Inhalation Nebulization Solution; USE 1 UNIT DOSE EVERY 4-6 HOURS AS NEEDED FOR WHEEZING ; Therapy: 29LHI4729 to (Last Rx:21Mar2017)  Requested for: 21Mar2017 Ordered   3  Aspirin EC 81 MG Oral Tablet Delayed Release; take 1 tablet by mouth every day; Therapy: 86EKI6656 to Recorded   4  BD Insulin Syringe U-500 31G X 6MM 0 5 ML Miscellaneous; Use 3 per day as directed with U-500 insulin; Therapy: 16OVF1187 to (Evaluate:30Dhk0018)  Requested for: 16New5737; Last Rx:37Wvy7685 Ordered   5  Budesonide 0 5 MG/2ML Inhalation Suspension; USE 1 UNIT DOSE VIA NEBULIZER TWO TIMES A DAY; Therapy: 29Apr2016 to (Last Rx:29Apr2016)  Requested for: 29Apr2016 Ordered   6  Cyclobenzaprine HCl - 10 MG Oral Tablet; One Tablet twice daily  Requested for: 19DSH6812; Last Rx:19Jan2017 Ordered   7   Fish Oil CAPS; One capsule twice daily; Therapy: (Maurer [place]) to Recorded   8  Furosemide 40 MG Oral Tablet; Therapy: (Recorded:20Nov2017) to Recorded   9  Gabapentin 300 MG Oral Capsule; Take 1 capsule twice daily; Therapy: (Recorded:20Nov2017) to Recorded   10  HumuLIN R U-500 (CONCENTRATED) 500 UNIT/ML Subcutaneous Solution; INJECT 55  UNITS SQ at 8 AM, 55 UNITS at 12 PM, 45  UNITS at 6PM  ****USE WITH U-500 SYRINGE ONLY***;  Therapy: 50RHE6780 to (Evaluate:87Fsu3144)  Requested for: 00Iia9396; Last  Rx:52Oii9375 Ordered   11  Iron (Ferrous Sulfate) 142 (45 Fe) MG Oral Tablet Extended Release; Therapy: (Recorded:20Nov2017) to Recorded   12  Metoprolol Tartrate 25 MG Oral Tablet; Take 1 tablet twice daily  Requested for:  39ISU3413; Last Rx:19Jan2017 Ordered   13  OneTouch Ultra Blue In [employer]; Check BG 4x per day; Therapy: 07EAV3924 to (Evaluate:98Hse9825)  Requested for: 08ZFZ6715; Last  Rx:19Jan2017 Ordered   14  Oxycodone-Acetaminophen 7 5-325 MG Oral Tablet; TAKE 1 TABLET EVERY 4 HOURS  AS NEEDED FOR BREAKTHROUGH PAIN;  Therapy: 23XRA7466 to (Evaluate:31Mar2017); Last Rx:21Mar2017 Ordered   15  Pantoprazole Sodium 40 MG Oral Tablet Delayed Release; Therapy: (Recorded:20Nov2017) to Recorded   16  Potassium Chloride ER 10 MEQ Oral Tablet Extended Release; Therapy: (Recorded:20Nov2017) to Recorded   17  Simvastatin 10 MG Oral Tablet; TAKE 1 TABLET DAILY; Therapy: 29Apr2016 to Recorded   18  Spiriva Respimat 2 5 MCG/ACT Inhalation Aerosol Solution; INHALE 2 PUFFS ONCE  DAILY; Therapy: 56IBM4304 to (Last Rx:19Oct2017)  Requested for: 19Oct2017 Ordered   19  Stiolto Respimat 2 5-2 5 MCG/ACT Inhalation Aerosol Solution; 2 puffs once a day; Therapy: 88XOH5865 to (Last Rx:21Mar2017)  Requested for: 21Mar2017 Ordered  Medication List Reviewed: The medication list was reviewed and updated today  Allergies  Medication    1   MetFORMIN HCl TABS    Vitals  Vital Signs    Recorded: 20Nov2017 09:46AM   Heart Rate 84, Apical   Systolic 138, LUE, Sitting   Diastolic 76, LUE, Sitting   Height 6 ft    Weight 278 lb 7 oz   BMI Calculated 37 76   BSA Calculated 2 45       Physical Exam   Constitutional  General appearance: No acute distress, well appearing and well nourished  Eyes  Conjunctiva and Sclera examination: Conjunctiva pink, sclera anicteric  Ears, Nose, Mouth, and Throat - External inspection of ears and nose: Normal without deformities or discharge  Neck  Neck and thyroid: Normal, supple, trachea midline, no thyromegaly  Pulmonary  Respiratory effort: No increased work of breathing or signs of respiratory distress  Auscultation of lungs: Clear to auscultation, no rales, no rhonchi, no wheezing, good air movement  Cardiovascular  Auscultation of heart: Normal rate and rhythm, normal S1 and S2, no murmurs  Carotid pulses: Normal, 2+ bilaterally  Examination of extremities for edema and/or varicosities: Abnormal   bilateral ankle Trace+ pitting edema  Chest - Chest: Normal   Abdomen  Abdomen: Non-tender and no distention  Musculoskeletal Gait and station: Normal gait  Skin - Skin and subcutaneous tissue: Normal without rashes or lesions  Skin is warm and well perfused, normal turgor  Neurologic - Speech: Normal    Psychiatric - Orientation to person, place, and time: Normal -- Mood and affect: Normal       Results/Data  ECG Report:  Rhythm and rate:  ventricular rate is 84 beats per minute  -- normal sinus rhythm    QRS: right bundle branch block      Future Appointments    Date/Time Provider Specialty Site   11/28/2017 03:40 PM Dayan James, 10 [facility] Nephrology [address]   11/21/2017 03:15 PM Luc Ramírez, 10 [facility] Endocrinology [facility] ENDOCRINOLOGY       Signatures   Electronically signed by : ANUJ Jamil ; Nov 20 2017 10:02AM EST                       (Author)

## 2017-11-23 NOTE — PROGRESS NOTES
Assessment  1  Diabetes mellitus type 2, uncontrolled (250 02) (E11 65)   2  Current use of insulin (V58 67) (Z79 4)   3  Diabetic neuropathy (250 60,357 2) (E11 40)   4  Essential hypertension (401 9) (I10)   5  Dyslipidemia (272 4) (E78 5)    Plan  Diabetes mellitus type 2, uncontrolled    · (1) HEMOGLOBIN A1C; Status:Active; Requested DUS:[id number];    Perform:[facility]; XXH:[id number]; Ordered; For:Diabetes mellitus type 2, uncontrolled; Ordered By:Mike Ramírez;  Essential hypertension    · (1) COMPREHENSIVE METABOLIC PANEL; Status:Active; Requested RPR:[id number];    Perform:[facility]; DLD:[id number]; Ordered;hypertension; Ordered By:Kaitlin Ramírez;    Discussion/Summary  Discussion Summary:   1  Type 2 diabetes: For his hyperglycemia, increase 8AM and 12PM HUMULIN R U 500 to 60 units  6 PM dose will remain the same  He is being treated with intensive insulin therapy which increases his risk of hypoglycemia  Episodes of hypoglycemic and lead to permanent disability and death  He will continue to check his blood sugars 3-4 times daily and forward record to the office in 1 week for review and adjustment, if necessary  Instructed to contact the office with any consistent episodes of hypoglycemia  He will continue to follow podiatry for neuropathy  Check hemoglobin A1c  Hypertension: He is normotensive in the office today  Continue furosemide and metoprolol  Check comprehensive metabolic panel  Hyperlipidemia: Stable  Continue simvastatin and fish oil  Counseling Documentation With Imm: The patient, patient's family was counseled regarding diagnostic results,-- instructions for management,-- risk factor reductions,-- patient and family education,-- impressions,-- risks and benefits of treatment options,-- importance of compliance with treatment  Medication SE Review and Pt Understands Tx: Possible side effects of new medications were reviewed with the patient/guardian today   The treatment plan was reviewed with the patient/guardian  The patient/guardian understands and agrees with the treatment plan      Chief Complaint  Chief Complaint Free Text Note Form: Follow Up      History of Present Illness  HPI: 45-year-old male presents in the office today with his spouse for follow-up of type 2 diabetes with hypertension and hyperlipidemia  He states he has had a past medical history of type 2 diabetes for approximately 20 years and started insulin therapy approximately 1 year ago  He was recently hospitalized for a COPD exacerbation [facility]  He is currently utilizing Novolin 70/30- 55 units at 8 AM, 55 units at 12 PM and 35 units at 6 PM  He is checking his blood sugars 3 times daily  Review of his blood sugar records reveals hyperglycemia in the 180-300 range consistently in the afternoon and evening  Morning fasting blood sugars are in the 100-180 range  His most recent hemoglobin A1c obtained on August 4, 2017 was 8 9  He is currently due for diabetic eye exam  By his report he has no past medical history of retinopathy  Obtained diabetic eye exam with Dr Chana Campos, reports no retinopathy  He complains of numbness/tingling/pain/swelling to feet episodically and follows podiatry regularly  her hypertension, he takes furosemide 40 mg daily and metoprolol 25 mg twice daily  hyperlipidemia is treated with simvastatin 10 mg daily and fish oil 1 capsule twice daily  Review of Systems  Endo Adult ROS Male Established v2 - [facility]:  Constitutional/General: no recent weight gain,-- no recent weight loss,-- poor energy/fatigue,-- no increased energy level,-- no insomnia/sleep problems,-- no fever-- and-- feeling weak  Heart: high blood pressure, but-- no chest pain/tightness,-- no rapid/racing heart rate-- and-- no palpitations  Genitourinary - Urinary urinating during the night, but-- no frequent urination-- and-- no excess urination    Eyes: no blurred vision,-- no double vision,-- no bulging eyes,-- no gritty/scratchy eyes-- and-- no excessive tearing  Mouth / Throat: hoarseness, but-- no difficulty swallowing  Neck: no lumps,-- no swollen glands,-- no neck pain,-- no neck stiffness-- and-- no enlarged thyroid  Respiratory: no wheezing,-- no asthma-- and-- no persistent cough  Musculoskeletal: no muscle aches/pain,-- no joint aches/pain-- and-- no muscle weakness  Skin & Hair: dry skin,-- no acne,-- the hair texture was not oily,-- no hair loss-- and-- no excessive hair growth  Gastrointestinal: no constipation,-- no diarrhea,-- wakes at night to drink-- and-- no stomach ache  Neurological: no blackouts,-- weakness-- and-- tremors  Genital: no testicular pain,-- no testicular lumps/bumps/mass-- and-- does not perform monthly testicular exam   Endocrine: no feeling hot frequently,-- no feeling cold frequently,-- no shifts between feeling hot and cold,-- cold hands or feet,-- no excessive sweating,-- no thyroid problems,-- blood sugar problems,-- excessive thirst,-- no excessive hunger,-- no change in shoe size,-- no nausea or vomiting-- and-- shaky hands  ROS Reviewed:   ROS reviewed  Active Problems  1  Acute duodenal ulcer with hemorrhage (532 00) (K26 0)   2  Chronic back pain (724 5,338 29) (M54 9,G89 29)   3  Chronic obstructive pulmonary disease (496) (J44 9)   4  Colon cancer screening (V76 51) (Z12 11)   5  Cor pulmonale (416 9) (I27 81)   6  Coronary artery disease involving native heart without angina pectoris (414 01) (I25 10)   7  Current use of insulin (V58 67) (Z79 4)   8  Diabetes mellitus type 2, uncontrolled (250 02) (E11 65)   9  Diabetic neuropathy (250 60,357 2) (E11 40)   10  Dyslipidemia (272 4) (E78 5)   11  Essential hypertension (401 9) (I10)   12  GERD (gastroesophageal reflux disease) (530 81) (K21 9)   13  Gross hematuria (599 71) (R31 0)   14  Hypoglycemia associated with diabetes (250 80) (E11 649)   15   Morbid obesity with BMI of 40 0-44 9, adult (278 01,V85 41) (E66 01,Z68 41)   16  Need for pneumococcal vaccination (V03 82) (Z23)   17  Nicotine dependence (305 1) (F17 200)   18  Obstructive sleep apnea (327 23) (G47 33)   19  Other nonspecific abnormal finding of lung field (793 19) (R91 8)   20  Pleural Effusion (511 9)   21  Prostate cancer (185) (C61)   22  RBBB (right bundle branch block) (426 4) (I45 10)   23  Screening for neurological condition (V80 09) (Z13 89)   24  Vertigo (780 4) (R42)    Past Medical History  1  History of Acute UTI (599 0) (N39 0)   2  Chronic obstructive pulmonary disease (496) (J44 9)   3  History of Coronary Artery Disease (V12 59)   4  History of appendicitis (V12 79) (Z87 19)   5  History of back pain (V13 59) (Z87 39)   6  History of cancer (V10 90) (Z85 9)   7  History of chronic obstructive lung disease (V12 69) (Z87 09)   8  History of diabetes mellitus (V12 29) (Z86 39)   9  History of erectile dysfunction (V13 89) (Z87 438)   10  History of hematuria (V13 09) (Z87 448)   11  History of hemorrhoids (V13 89) (Z87 19)   12  History of hypertension (V12 59) (Z86 79)   13  History of measles (V12 09) (Z86 19)   14  History of mumps (V12 09) (Z86 19)   15  History of pneumonia (V12 61) (Z87 01)   16  History of varicella (V12 09) (Z86 19)   17  History of Prostate Cancer (V10 46)  Active Problems And Past Medical History Reviewed: The active problems and past medical history were reviewed and updated today  Surgical History  1  History of Appendectomy   2  History of Diagnostic Cystoscopy   3  History of Prostate Surgery  Surgical History Reviewed: The surgical history was reviewed and updated today  Family History  Mother    1  Family history of cerebrovascular accident (CVA) (V17 1) (Z82 3)   2  Family history of hypertension (V17 49) (Z82 49)   3  Denied: Family history of mental disorder  Father    4  Family history of asthma (V17 5) (Z82 5)   5  Family history of lung disease (V19 8) (Z83 6)   6   Family history of malignant neoplasm (V16 9) (Z80 9)   7  Denied: Family history of mental disorder  Family History Reviewed: The family history was reviewed and updated today  Social History     · Caffeine use (V49 89) (F15 90)   · Exercises rarely (V49 89) (Z78 9)   · Former smoker (V15 82) (Z66 655)   · Lives with family   ·    · Denied: History of Never a smoker   · Never uses seat belt   · No alcohol use   · No living will   · No Restorationism beliefs   · Denied: History of No secondhand smoke exposure   · Denied: History of Recreational drug use   · Retired  Social History Reviewed: The social history was reviewed and updated today  Current Meds   1  Albuterol Sulfate (2 5 MG/3ML) 0 083% Inhalation Nebulization Solution; USE 1 UNIT DOSE EVERY 4-6 HOURS AS NEEDED FOR WHEEZING ; Therapy: 08Apr2014 to (Last Rx:08Apr2014)  Requested for: 08Apr2014 Ordered   2  Albuterol Sulfate (2 5 MG/3ML) 0 083% Inhalation Nebulization Solution; USE 1 UNIT DOSE EVERY 4-6 HOURS AS NEEDED FOR WHEEZING ; Therapy: 06EUM2178 to (Last Rx:21Mar2017)  Requested for: 21Mar2017 Ordered   3  Aspirin EC 81 MG Oral Tablet Delayed Release; take 1 tablet by mouth every day; Therapy: 34ANK7684 to Recorded   4  BD Insulin Syringe U-500 31G X 6MM 0 5 ML Miscellaneous; Use 3 per day as directed with U-500 insulin; Therapy: 18PSL2952 to (Evaluate:18Sya6380)  Requested for: 92Khg6704; Last Rx:98Fwe5126 Ordered   5  Budesonide 0 5 MG/2ML Inhalation Suspension; USE 1 UNIT DOSE VIA NEBULIZER TWO TIMES A DAY; Therapy: 29Apr2016 to (Last Rx:29Apr2016)  Requested for: 29Apr2016 Ordered   6  Cyclobenzaprine HCl - 10 MG Oral Tablet; One Tablet twice daily  Requested for: 68FUO7172; Last Rx:19Jan2017 Ordered   7  Fish Oil CAPS; One capsule twice daily; Therapy: (Angela Barnes) to Recorded   8  Furosemide 40 MG Oral Tablet; Therapy: (Recorded:20Nov2017) to Recorded   9  Gabapentin 300 MG Oral Capsule; Take 1 capsule twice daily;  Therapy: (Recorded:20Nov2017) to Recorded   10  HumuLIN R U-500 (CONCENTRATED) 500 UNIT/ML Subcutaneous Solution; INJECT 55  UNITS SQ at 8 AM, 55 UNITS at 12 PM, 45  UNITS at 6PM  ****USE WITH U-500 SYRINGE ONLY***;  Therapy: 52TQK6274 to (Evaluate:02Smh7194)  Requested for: 68Hgd6386; Last  Rx:64Yls2363 Ordered   11  Iron (Ferrous Sulfate) 142 (45 Fe) MG Oral Tablet Extended Release; Therapy: (Recorded:20Nov2017) to Recorded   12  Metoprolol Tartrate 25 MG Oral Tablet; Take 1 tablet twice daily  Requested for:  98KQP7436; Last Rx:19Jan2017 Ordered   13  OneTouch Ultra Blue In [employer]; Check BG 4x per day; Therapy: 78NTG1130 to (Evaluate:15Jkr7168)  Requested for: 53QMW4408; Last  Rx:19Jan2017 Ordered   14  Oxycodone-Acetaminophen 7 5-325 MG Oral Tablet; TAKE 1 TABLET EVERY 4 HOURS  AS NEEDED FOR BREAKTHROUGH PAIN;  Therapy: 83AWI1407 to (Evaluate:31Mar2017); Last Rx:21Mar2017 Ordered   15  Pantoprazole Sodium 40 MG Oral Tablet Delayed Release; Therapy: (Recorded:20Nov2017) to Recorded   16  Potassium Chloride ER 10 MEQ Oral Tablet Extended Release; Therapy: (Recorded:20Nov2017) to Recorded   17  Simvastatin 10 MG Oral Tablet; TAKE 1 TABLET DAILY; Therapy: 29Apr2016 to Recorded   18  Spiriva Respimat 2 5 MCG/ACT Inhalation Aerosol Solution; INHALE 2 PUFFS ONCE  DAILY; Therapy: 92DBF5300 to (Last Rx:19Oct2017)  Requested for: 19Oct2017 Ordered   19  Stiolto Respimat 2 5-2 5 MCG/ACT Inhalation Aerosol Solution; 2 puffs once a day; Therapy: 86TZU9471 to (Last Rx:21Mar2017)  Requested for: 21Mar2017 Ordered  Medication List Reviewed: The medication list was reviewed and updated today  Allergies  1  MetFORMIN HCl TABS    Vitals  Vital Signs    Recorded: 21Nov2017 03:24PM   Heart Rate 72   Systolic 799   Diastolic 62   Height 6 ft    Weight 278 lb 7 04 oz   BMI Calculated 37 76   BSA Calculated 2 45       Physical Exam   Constitutional  General appearance: Abnormal  -- Obese    Eyes  Conjunctiva and lids: No swelling, erythema, or discharge  Pupils: Equal, round and reactive to light  The sclera are anicteric  Extraocular movements are intact  Ears, Nose, Mouth, and Throat  External inspection of ears, nose and lips: Normal    Oropharynx: Normal with no erythema, edema, exudate or lesions  Exam of Head: The head is atraumatic and normocephalic  Neck: The neck is supple  The thyroid is normal in size with no palpable nodules  Pulmonary  Respiratory effort: Abnormal  -- Productive cough  Auscultation of lungs: Abnormal  -- Diminished bilaterally  Cardiovascular  Auscultation of heart: Normal rate and rhythm with no murmurs, gallops or rubs  Examination of extremities for edema and/or varicosities: Abnormal  -- + 1 pedal edema  Examination of pulses: Dorsalis pedal pulses are +2 and equal bilaterally  Examination of Carotids: No bruits  Abdomen  Abdomen: Abdomen is soft, non-tender with normal bowel sounds  Liver and spleen: No hepatomegaly or splenomegaly  Lymphatic  Palpation of lymph nodes: No supraclavicular or suboccipital lymphadenopathy  Musculoskeletal  Gait and station: Normal    Digits and nails: Normal without clubbing or cyanosis  Inspection/palpation of joints, bones, and muscles: Muscle bulk and tone is normal    Skin  Skin and subcutaneous tissue: Abnormal  -- Dry skin  Neurologic  Cranial nerves: Cranial nerves 2-12 intact  Reflexes: 2+ and symmetric  Sensation: No sensory loss  Motor Strength: Strength is 5/5 bilaterally  Psychiatric  Orientation to person, place and time: Normal    Mood and affect: Affect and attention span are normal      Socks and shoes removed, Right Foot Findings: swollen, erythematous and dry  The right toes were swollen  Diminished tactile sensation with monofilament testing throughout the right foot  Socks and shoes removed, Left Foot Findings: swollen, erythematous and dry  The left toes were swollen   Diminished tactile sensation with monofilament testing throughout the left foot  Pulses:  1+ in the dorsalis pedis on the right  Pulses:  1+ in the dorsalis pedis on the left  Regular podiatry care      Results/Data  (1) HEMOGLOBIN A1C [id number] 10:45AM [facility], Provider   Test ordered by: Braedenl Session     Test Name Result Flag Reference   HEMOGLOBIN A1C 8 9 % H 4 2-6 3   EST  AVG  GLUCOSE 209 mg/dl       (1) BASIC METABOLIC PROFILE 50GEQ5788 10:45AM [facility], Provider   Test ordered by: [address] [place] [address] Name Result Flag Reference   SODIUM 136 mmol/L  136-145   POTASSIUM 4 2 mmol/L  3 5-5 3   CHLORIDE 99 mmol/L L 100-108   CARBON DIOXIDE 31 mmol/L  21-32   ANION GAP (CALC) 6 mmol/L  4-13   BLOOD UREA NITROGEN 20 mg/dL  5-25   CREATININE 1 16 mg/dL  0 60-1 30   Standardized to IDMS reference method   CALCIUM 8 4 mg/dL  8 3-10 1   eGFR 64 ml/min/1 73sq m       National Kidney Disease Education Program recommendations are as follows: GFR calculation is accurate only with a steady state creatinine Chronic Kidney disease less than 60 ml/min/1 73 sq  meters Kidney failure less than 15 ml/min/1 73 sq  meters     GLUCOSE FASTING 121 mg/dL H 65-99     (1) COMPREHENSIVE METABOLIC PANEL 12MZL7703 12:76YB [facility], Provider   Test ordered by: Braedenl Session     Test Name Result Flag Reference   SODIUM 136 mmol/L  136-145   POTASSIUM 4 2 mmol/L  3 5-5 3   CHLORIDE 99 mmol/L L 100-108   CARBON DIOXIDE 31 mmol/L  21-32   ANION GAP (CALC) 6 mmol/L  4-13   BLOOD UREA NITROGEN 20 mg/dL  5-25   CREATININE 1 16 mg/dL  0 60-1 30   Standardized to IDMS reference method   CALCIUM 8 4 mg/dL  8 3-10 1   BILI, TOTAL 0 47 mg/dL  0 20-1 00   ALK PHOSPHATAS 87 U/L     ALT (SGPT) 41 U/L  12-78   AST(SGOT) 36 U/L  5-45   ALBUMIN 3 0 g/dL L 3 5-5 0   TOTAL PROTEIN 6 8 g/dL  6 4-8 2   eGFR 64 ml/min/1 73sq m       National Kidney Disease Education Program recommendations are as follows: GFR calculation is accurate only with a steady state creatinine Chronic Kidney disease less than 60 ml/min/1 73 sq  meters Kidney failure less than 15 ml/min/1 73 sq  meters  GLUCOSE FASTING 121 mg/dL H 65-99     (1) LIPID PANEL, FASTING 66AEL2830 10:45AM [facility], Provider   Test ordered by: Odette Chris     Test Name Result Flag Reference   CHOLESTEROL 147 mg/dL     HDL,DIRECT 63 mg/dL H 40-60   Specimen collection should occur prior to Metamizole administration due to the potential for falsely depressed results  LDL CHOLESTEROL CALCULATED 58 mg/dL  0-100     This is a fasting blood test  Water,black tea or black  coffee only after 9:00pm the night before test  Drink 2 glasses of water the morning of test    Triglyceride:       Normal              <150 mg/dl      Borderline High    150-199 mg/dl      High               200-499 mg/dl      Very High          >499 mg/dl Cholesterol:       Desirable        <200 mg/dl     Borderline High  200-239 mg/dl     High             >239 mg/dl HDL Cholesterol:      High    >59 mg/dL     Low     <41 mg/dL LDL CALCULATED:  This screening LDL is a calculated result  It does not have the accuracy of the Direct Measured LDL in the monitoring of patients with hyperlipidemia and/or statin therapy  Direct Measure LDL (KED704) must be ordered separately in these patients  TRIGLYCERIDES 132 mg/dL  <=150   Specimen collection should occur prior to N-Acetylcysteine or Metamizole administration due to the potential for falsely depressed results       Future Appointments    Date/Time Provider Specialty Site   11/28/2017 03:40 PM Shukri Dia [facility] Nephrology  [place] [address]       Signatures   Electronically signed by : Shukri Ramos ; Nov 21 2017  3:57PM EST                       (Author)    Electronically signed by : ANUJ Carrillo ; Nov 22 2017  7:58AM EST

## 2017-11-24 ENCOUNTER — APPOINTMENT (OUTPATIENT)
Dept: PULMONOLOGY | Age: 72
End: 2017-11-24
Payer: COMMERCIAL

## 2017-11-24 PROCEDURE — G0424 PULMONARY REHAB W EXER: HCPCS

## 2017-11-28 ENCOUNTER — GENERIC CONVERSION - ENCOUNTER (OUTPATIENT)
Dept: OTHER | Facility: OTHER | Age: 72
End: 2017-11-28

## 2017-11-28 ENCOUNTER — GENERIC CONVERSION - ENCOUNTER (OUTPATIENT)
Dept: NEPHROLOGY | Facility: HOSPITAL | Age: 72
End: 2017-11-28

## 2017-11-28 ENCOUNTER — APPOINTMENT (OUTPATIENT)
Dept: PULMONOLOGY | Age: 72
End: 2017-11-28
Payer: COMMERCIAL

## 2017-11-28 PROCEDURE — G0424 PULMONARY REHAB W EXER: HCPCS

## 2017-11-30 ENCOUNTER — APPOINTMENT (OUTPATIENT)
Dept: PULMONOLOGY | Age: 72
End: 2017-11-30
Payer: COMMERCIAL

## 2017-11-30 PROCEDURE — G0424 PULMONARY REHAB W EXER: HCPCS

## 2017-12-05 ENCOUNTER — APPOINTMENT (OUTPATIENT)
Dept: PULMONOLOGY | Age: 72
End: 2017-12-05
Payer: COMMERCIAL

## 2017-12-05 PROCEDURE — G0424 PULMONARY REHAB W EXER: HCPCS

## 2017-12-07 ENCOUNTER — APPOINTMENT (OUTPATIENT)
Dept: PULMONOLOGY | Age: 72
End: 2017-12-07
Payer: COMMERCIAL

## 2017-12-07 PROCEDURE — G0424 PULMONARY REHAB W EXER: HCPCS

## 2017-12-12 ENCOUNTER — APPOINTMENT (OUTPATIENT)
Dept: PULMONOLOGY | Age: 72
End: 2017-12-12
Payer: COMMERCIAL

## 2017-12-12 PROCEDURE — G0424 PULMONARY REHAB W EXER: HCPCS

## 2017-12-14 ENCOUNTER — APPOINTMENT (OUTPATIENT)
Dept: PULMONOLOGY | Age: 72
End: 2017-12-14
Payer: COMMERCIAL

## 2017-12-19 ENCOUNTER — APPOINTMENT (OUTPATIENT)
Dept: PULMONOLOGY | Age: 72
End: 2017-12-19
Payer: COMMERCIAL

## 2017-12-21 ENCOUNTER — APPOINTMENT (OUTPATIENT)
Dept: PULMONOLOGY | Age: 72
End: 2017-12-21
Payer: COMMERCIAL

## 2017-12-26 ENCOUNTER — APPOINTMENT (OUTPATIENT)
Dept: PULMONOLOGY | Age: 72
End: 2017-12-26
Payer: COMMERCIAL

## 2017-12-26 PROCEDURE — G0424 PULMONARY REHAB W EXER: HCPCS

## 2017-12-28 ENCOUNTER — APPOINTMENT (OUTPATIENT)
Dept: PULMONOLOGY | Age: 72
End: 2017-12-28
Payer: COMMERCIAL

## 2017-12-28 PROCEDURE — G0424 PULMONARY REHAB W EXER: HCPCS

## 2018-01-09 NOTE — MISCELLANEOUS
Message  GI Reminder Recall [facility]:   Date: 10/23/2017   Dear Valenitn Young:     Review of our records shows you are due for the following: EGD  Or records indicate that you are due at this time to have a follow-up examination for a EGD  As you know, these tests are done to prevent cancer, a very common disease in the [place] and responsible for thousands of patient deaths each year  We at [facility] are concerned for your health, and would very much appreciate you getting in touch with us at your earliest convenience  Again, this examination is vital to your proper health maintenance and for the prevention of cancer  We have attempted to reach you and have been unsuccessful  Please contact our office to schedule your procedure  Thank You       Please call the following office to schedule your appointment:   [address], [address], [place], [address] [facility] [phone]  We look forward to hearing from you!      Sincerely,         Signatures   Electronically signed by : Adria Bence, ; Oct 23 2017  7:28AM EST                       (Author)

## 2018-01-10 NOTE — MISCELLANEOUS
History of Present Illness  HPI: HFU call  Pt admitted 7/16-7/21 with ARF, AE COPD and MARANDA  Readm for OBS stay 7/23-7/24 with back pain, thought to be related to a fall  Home with [facility] after adm #2  COPD Hospital Discharge Initial Follow-Up Call: The patient is being contacted for follow-up after hospitalization  The date of discharge is 7/21/2017  I spoke with Jaylan Braker   Patient was identified as high risk for readmission per risk stratification  The patient was discharged to home with [address] [place] [address]  The FEV1 is 41 %  The Gold Stage of COPD is severe  The patient is a former smoker with a 112 pack year history  The patient quit smoking in     Zone tool status is yellow  Current Meds    1  Cyclobenzaprine HCl - 10 MG Oral Tablet; One Tablet twice daily  Requested for:   40WAR8511; Last Rx:19Jan2017 Ordered    2  Albuterol Sulfate (2 5 MG/3ML) 0 083% Inhalation Nebulization Solution; USE 1 UNIT   DOSE EVERY 4-6 HOURS AS NEEDED FOR WHEEZING ; Therapy: 07KLX9201 to (Last Rx:21Mar2017)  Requested for: 21Mar2017 Ordered   3  Budesonide 0 5 MG/2ML Inhalation Suspension; USE 1 UNIT DOSE VIA NEBULIZER   TWO TIMES A DAY; Therapy: 11Xef5407 to (Last Rx:29Apr2016)  Requested for: 79Ynd2492 Ordered   4  PredniSONE 10 MG Oral Tablet; TAKE 4 TABLETS DAILY FOR 2 DAYS,3 TABLETS DAILY   FOR 2 DAYS, 2 TABLETS DAILY FOR 2 DAYS AND 1 TABLET DAILY FOR 2 DAYS,   THEN STOP; Therapy: 39UIN2263 to (Last Rx:76Xah9415)  Requested for: 58LIT3857 Ordered    5  Metoprolol Tartrate 25 MG Oral Tablet; Take 1 tablet twice daily  Requested for:   80IVM0038; Last Rx:19Jan2017 Ordered    6  BD Insulin Syringe Ultrafine 31G X 5/16" 0 3 ML Miscellaneous; Use three times per day; Therapy: 61JJC7348 to (Evaluate:13Nov2017); Last Rx:21Bvx6915 Ordered   7  BD Pen Needle Beena U/F 32G X 4 MM Miscellaneous; Use three times a day along with   insulin;    Therapy: 98WYZ6739 to (Evaluate:26Jun2017)  Requested for: 10FEQ0016; Last Rx:28Mar2017 Ordered   8  HumuLIN R U-500 (CONCENTRATED) 500 UNIT/ML Subcutaneous Solution; INJECT 55   UNITS SUBCUTANEOUSLY at 8 AM, 55 UNITS at 12 PM, 45   UNITS at 6PM>;   Therapy: 36ETN5312 to (Evaluate:22Oct2017); Last Rx:25Apr2017 Ordered   9  NovoLIN 70/30 ReliOn (70-30) 100 UNIT/ML Subcutaneous Suspension; 55 units at   8AM, 55 units at 12PM and 45 units at [facility] when unable to   obtain the U500 due to cost;   Therapy: 52UDV3802 to Recorded   10  OneTouch Ultra Blue In [employer]; Check BG 4x per day; Therapy: 89WTJ7031 to (Evaluate:88Rba8439)  Requested for: 83CQB1546; Last    Rx:19Jan2017 Ordered    11  Lisinopril 5 MG Oral Tablet; TAKE 1 TABLET DAILY; Last Rx:56Voq4064 Ordered    12  Albuterol Sulfate (2 5 MG/3ML) 0 083% Inhalation Nebulization Solution; USE 1 UNIT    DOSE EVERY 4-6 HOURS AS NEEDED FOR WHEEZING ; Therapy: 08Apr2014 to (Last Rx:08Apr2014)  Requested for: 08Apr2014 Ordered    13  Oxycodone-Acetaminophen 7 5-325 MG Oral Tablet; TAKE 1 TABLET EVERY 4 HOURS    AS NEEDED FOR BREAKTHROUGH PAIN;    Therapy: 76EWD0158 to (Evaluate:31Mar2017); Last Rx:21Mar2017 Ordered   14  Stiolto Respimat 2 5-2 5 MCG/ACT Inhalation Aerosol Solution; 2 puffs once a day; Therapy: 20TUR4341 to (Last Rx:21Mar2017)  Requested for: 21Mar2017 Ordered    15  Aspirin EC 81 MG Oral Tablet Delayed Release; take 1 tablet by mouth every day; Therapy: 09QLL9891 to Recorded   16  Fish Oil CAPS; One capsule twice daily; Therapy: (Alex Maloney) to Recorded   17  Simvastatin 10 MG Oral Tablet; TAKE 1 TABLET DAILY; Therapy: 29Apr2016 to Recorded    Allergies    1  MetFORMIN HCl TABS    Future Appointments    Date/Time Provider Specialty Site   11/03/2017 11:30 AM ANUJ Hamilton   Endocrinology [facility] ENDOCRINOLOGY   11/14/2017 09:30 AM Cr Brice, [address]  [facility]ph[facility]     Signatures   Electronically signed by : Shantel Pacheco, ; Nov 1 2017 11:16AM EST (Author)    Electronically signed by : Tree Burr, ; Nov 1 2017 11:16AM EST                       (Author)

## 2018-01-10 NOTE — MISCELLANEOUS
Provider Comments  Provider Comments:   patient was a no show for todays appt      Signatures   Electronically signed by : Erin Herrera DO; Mar  9 2016  8:46AM EST                       (Author)

## 2018-01-10 NOTE — MISCELLANEOUS
Message   Recorded as Task   Date: 08/15/2017 04:36 PM, Created By: Neva Stover   Task Name: Care Coordination   Assigned To: Mer Lane   Regarding Patient: Mita Rodriguez, Status: Active   CommentClovis Parkinson - 15 Aug 2017 4:36 PM     TASK CREATED  call [facility] pharmacy   Spoke with patient's wife by phone  Has new insurance that will Cover U-500 Vial  Does not want pen  Advised patient to discard All previous syringes  Use Only the Syringe that has a green cap and says U-500 insulin  RX For Vial/syringe sent to pharmacy  Take insulin as directed according to pharmacy instructions  No Dose conversions  If he uses the wrong syringe he will get 5x the suggested dose of insulin so he should be sure to get rid of all prior syringes and use U500 syringe only  Spoke with pharmacist, she is aware and agrees  Plan  Diabetes mellitus type 2, uncontrolled    · HumuLIN R U-500 (CONCENTRATED) 500 UNIT/ML Subcutaneous Solution;  INJECT 55 UNITS SQ at 8 AM, 55 UNITS at  12 PM, 45 UNITS at 6PM  ****USE WITH U-500 SYRINGE ONLY***    Signatures   Electronically signed by : VALERIANO Elena;  Aug 15 2017  5:03PM EST                       (Author)

## 2018-01-11 NOTE — MISCELLANEOUS
History of Present Illness  COPD Hospital Discharge Initial Follow-Up Call: The patient is being contacted for follow-up after hospitalization  The date of discharge is 11/2/17  I spoke with patient  Patient was identified as medium risk for readmission per risk stratification  The patient was discharged to home with [address] [place] [address]  The Gold Stage of COPD is very severe  The patient is a former smoker with a pack year history  The patient quit smoking in 2015  The patient is experiencing the following symptoms: cough, but no wheezing, no dyspnea, no fever and not coughing up sputum  Oxygen is used all the time  Zone tool status is yellow  The following topics were reviewed:  rescue vs  maintenance inhalers, DME [place], pulmonary rehab, energy conservation, physician follow-ups and medication compliance  Narrative Summary:  Patient was discharged yesterday and called for follow-up  He is tired, feeling better and breathing easier  He is taking his Symbicort and Spiriva twice daily  Reminded him that Spiriva is taking once daily, two puffs from Respimat  He has an order for a nebulizer and will wait until Nov 8th to  due to finances  He has a balance with [employer] of $181 and cannot pay on the balance at this time  He is scheduled to begin Pulmonary Rehab, and I scheduled him for an interview for Nov 9th  He will get [address] with [facility]  Discussed activity limitation, infection prevention and importance of MD visits as well as medication compliance  Patient quit smoking a few years ago and has stayed out of the hospital  Encouraged his willingness to engage in PA for better health  Also spoke with wife, Jose Amaya, who is an actively support for patient  Current Meds    1  Cyclobenzaprine HCl - 10 MG Oral Tablet; One Tablet twice daily  Requested for:   34UGS7134; Last Rx:19Jan2017 Ordered    2   Albuterol Sulfate (2 5 MG/3ML) 0 083% Inhalation Nebulization Solution; USE 1 UNIT DOSE EVERY 4-6 HOURS AS NEEDED FOR WHEEZING ; Therapy: 34WVR3464 to (Last Rx:03Nov2017)  Requested for: 47PJB5494 Ordered   3  Albuterol Sulfate (2 5 MG/3ML) 0 083% Inhalation Nebulization Solution; USE 1 UNIT   DOSE EVERY 4-6 HOURS AS NEEDED FOR WHEEZING ; Therapy: 09VNN4921 to (Last Rx:21Mar2017)  Requested for: 21Mar2017 Ordered   4  Budesonide 0 5 MG/2ML Inhalation Suspension; USE 1 UNIT DOSE VIA NEBULIZER   TWO TIMES A DAY; Therapy: 91Pyc5655 to (Last Rx:29Apr2016)  Requested for: 29Apr2016 Ordered    5  Spiriva Respimat 2 5 MCG/ACT Inhalation Aerosol Solution; INHALE 2 PUFFS ONCE   DAILY; Therapy: 99UJK4402 to (Last Rx:19Oct2017)  Requested for: 19Oct2017 Ordered    6  Metoprolol Tartrate 25 MG Oral Tablet; Take 1 tablet twice daily  Requested for:   27PEF7151; Last Rx:19Jan2017 Ordered    7  BD Insulin Syringe U-500 31G X 6MM 0 5 ML Miscellaneous; Use 3 per day as directed   with U-500 insulin; Therapy: 59HAB5597 to (Evaluate:76Kyo1554)  Requested for: 85Zuo6921; Last   Rx:15Aug2017 Ordered   8  HumuLIN R U-500 (CONCENTRATED) 500 UNIT/ML Subcutaneous Solution; INJECT 55   UNITS SQ at 8 AM, 55 UNITS at 12 PM, 45   UNITS at 6PM   ****USE WITH U-500 SYRINGE ONLY***;   Therapy: 61LVJ8635 to (Evaluate:44Idy6028)  Requested for: 01Mpu1261; Last   Rx:15Aug2017 Ordered   9  OneTouch Ultra Blue In [employer]; Check BG 4x per day; Therapy: 26BGS4417 to (Evaluate:38Oqe6208)  Requested for: 85NMO3311; Last   Rx:19Jan2017 Ordered    10  Lisinopril 5 MG Oral Tablet; TAKE 1 TABLET DAILY; Last Rx:22Vjg4091 Ordered    11  Albuterol Sulfate (2 5 MG/3ML) 0 083% Inhalation Nebulization Solution; USE 1 UNIT    DOSE EVERY 4-6 HOURS AS NEEDED FOR WHEEZING ; Therapy: 40Vju0982 to (Last Rx:08Apr2014)  Requested for: 08Apr2014 Ordered    12  Oxycodone-Acetaminophen 7 5-325 MG Oral Tablet (Percocet); TAKE 1 TABLET EVERY 4    HOURS AS NEEDED FOR BREAKTHROUGH PAIN;    Therapy: 03GTI2574 to (Evaluate:31Mar2017);  Last Rx:21Mar2017 Ordered   13  Stiolto Respimat 2 5-2 5 MCG/ACT Inhalation Aerosol Solution; 2 puffs once a day; Therapy: 04OEC3815 to (Last Rx:21Mar2017)  Requested for: 21Mar2017 Ordered    14  Aspirin EC 81 MG Oral Tablet Delayed Release; take 1 tablet by mouth every day; Therapy: 43GUK2106 to Recorded   15  Fish Oil CAPS; One capsule twice daily; Therapy: (Giuliana Oglesby) to Recorded   16  Simvastatin 10 MG Oral Tablet; TAKE 1 TABLET DAILY; Therapy: 29Apr2016 to Recorded    Allergies    1  MetFORMIN HCl TABS    Future Appointments    Date/Time Provider Specialty Site   11/20/2017 09:20 ANUJ Alvarez  Cardiology [facility] CARDIOLOG[facility]   11/14/2017 09:30 AM Shukri Nava Nephrology [address]     Signatures   Electronically signed by :  RT Anderson Asa; Nov  3 2017  2:43PM EST                       (Author)

## 2018-01-11 NOTE — MISCELLANEOUS
Provider Comments  Provider Comments:   PATIENT NO SHOWED FOR 10- APPT  Signatures   Electronically signed by :  Annabelle Vizcaino; Oct 12 2017 11:14AM EST                       (Author)

## 2018-01-12 VITALS
OXYGEN SATURATION: 85 % | SYSTOLIC BLOOD PRESSURE: 108 MMHG | TEMPERATURE: 97.6 F | BODY MASS INDEX: 38.79 KG/M2 | WEIGHT: 286.38 LBS | DIASTOLIC BLOOD PRESSURE: 60 MMHG | HEIGHT: 72 IN | RESPIRATION RATE: 20 BRPM | HEART RATE: 133 BPM

## 2018-01-12 VITALS
DIASTOLIC BLOOD PRESSURE: 62 MMHG | HEIGHT: 69 IN | HEART RATE: 78 BPM | SYSTOLIC BLOOD PRESSURE: 118 MMHG | BODY MASS INDEX: 40.73 KG/M2 | WEIGHT: 275.01 LBS

## 2018-01-13 VITALS
HEART RATE: 82 BPM | WEIGHT: 275.25 LBS | TEMPERATURE: 98.6 F | DIASTOLIC BLOOD PRESSURE: 62 MMHG | OXYGEN SATURATION: 96 % | BODY MASS INDEX: 37.28 KG/M2 | HEIGHT: 72 IN | SYSTOLIC BLOOD PRESSURE: 112 MMHG | RESPIRATION RATE: 20 BRPM

## 2018-01-13 VITALS
DIASTOLIC BLOOD PRESSURE: 82 MMHG | WEIGHT: 288.56 LBS | SYSTOLIC BLOOD PRESSURE: 138 MMHG | HEART RATE: 94 BPM | BODY MASS INDEX: 42.74 KG/M2 | HEIGHT: 69 IN

## 2018-01-13 VITALS
WEIGHT: 285 LBS | RESPIRATION RATE: 20 BRPM | TEMPERATURE: 97.5 F | HEIGHT: 69 IN | DIASTOLIC BLOOD PRESSURE: 78 MMHG | OXYGEN SATURATION: 87 % | HEART RATE: 106 BPM | BODY MASS INDEX: 42.21 KG/M2 | SYSTOLIC BLOOD PRESSURE: 160 MMHG

## 2018-01-14 VITALS
HEIGHT: 72 IN | SYSTOLIC BLOOD PRESSURE: 138 MMHG | WEIGHT: 278.44 LBS | DIASTOLIC BLOOD PRESSURE: 76 MMHG | HEART RATE: 84 BPM | BODY MASS INDEX: 37.71 KG/M2

## 2018-01-14 VITALS
SYSTOLIC BLOOD PRESSURE: 138 MMHG | DIASTOLIC BLOOD PRESSURE: 62 MMHG | BODY MASS INDEX: 37.71 KG/M2 | HEART RATE: 72 BPM | HEIGHT: 72 IN | WEIGHT: 278.44 LBS

## 2018-01-14 VITALS
RESPIRATION RATE: 20 BRPM | HEART RATE: 86 BPM | DIASTOLIC BLOOD PRESSURE: 72 MMHG | HEIGHT: 69 IN | SYSTOLIC BLOOD PRESSURE: 140 MMHG | BODY MASS INDEX: 40.4 KG/M2 | OXYGEN SATURATION: 93 % | WEIGHT: 272.8 LBS

## 2018-01-14 VITALS
HEIGHT: 69 IN | DIASTOLIC BLOOD PRESSURE: 58 MMHG | BODY MASS INDEX: 42.74 KG/M2 | WEIGHT: 288.56 LBS | HEART RATE: 70 BPM | SYSTOLIC BLOOD PRESSURE: 100 MMHG

## 2018-01-14 NOTE — CONSULTS
I had the pleasure of evaluating your patient, Panda Jacques  My full evaluation follows:      Chief Complaint  Patient is here today for hospital f/u  patient c/o SOB, occ Dizziness and Swelling in LE  EKG today  Patient is here today for follow up of chronic conditions described in HPI  History of Present Illness  Mr Itz Garay is a 81year old man with coronary artery disease, COPD, and pulmonary hypertension, recently in the hospital in September and November - mostly due to breathing issues  Does have some pedal edema  No chest pain  Review of Systems      Cardiac: has swelling in the     Skin: No complaints of nonhealing sores or skin rash  Genitourinary: No complaints of recurrent urinary tract infections, frequent urination at night, difficult urination, blood in urine, kidney stones, loss of bladder control, no kidney or prostate problems, no erectile dysfunction  Psychological: No complaints of feeling depressed, anxiety, panic attacks, or difficulty concentrating  General: No complaints of trouble sleeping, lack of energy, fatigue, appetite changes, weight changes, fever, frequent infections, or night sweats  Respiratory: shortness of breath  HEENT: No complaints of serious problems, hearing problems, nose problems, throat problems, or snoring  Gastrointestinal: No complaints of liver problems, nausea, vomiting, heartburn, constipation, bloody stools, diarrhea, problems swallowing, adbominal pain, or rectal bleeding  Hematologic: No complaints of bleeding disorders, anemia, blood clots, or excessive brusing  Neurological: No complaints of numbness, tingling, dizziness, weakness, seizures, headaches, syncope or fainting, AM fatigue, daytime sleepiness, no witnessed apnea episodes  Musculoskeletal: No complaints of arthritis, back pain, or painfull swelling  Active Problems    1  Acute duodenal ulcer with hemorrhage (532 00) (K26 0)   2   Chronic back pain (724 5,338 29) (M54 9,G89 29)   3  Chronic obstructive pulmonary disease (496) (J44 9)   4  Colon cancer screening (V76 51) (Z12 11)   5  Cor pulmonale (416 9) (I27 81)   6  Coronary artery disease involving native heart without angina pectoris (414 01) (I25 10)   7  Current use of insulin (V58 67) (Z79 4)   8  Diabetes mellitus type 2, uncontrolled (250 02) (E11 65)   9  Diabetic neuropathy (250 60,357 2) (E11 40)   10  Dyslipidemia (272 4) (E78 5)   11  Essential hypertension (401 9) (I10)   12  GERD (gastroesophageal reflux disease) (530 81) (K21 9)   13  Gross hematuria (599 71) (R31 0)   14  Hypoglycemia associated with diabetes (250 80) (E11 649)   15  Morbid obesity with BMI of 40 0-44 9, adult (278 01,V85 41) (E66 01,Z68 41)   16  Need for pneumococcal vaccination (V03 82) (Z23)   17  Nicotine dependence (305 1) (F17 200)   18  Obstructive sleep apnea (327 23) (G47 33)   19  Other nonspecific abnormal finding of lung field (793 19) (R91 8)   20  Pleural Effusion (511 9)   21  Prostate cancer (185) (C61)   22  RBBB (right bundle branch block) (426 4) (I45 10)   23  Screening for neurological condition (V80 09) (Z13 89)   24   Vertigo (780 4) (R42)    Past Medical History    · History of Acute UTI (599 0) (N39 0)   · Chronic obstructive pulmonary disease (496) (J44 9)   · History of Coronary Artery Disease (V12 59)   · History of appendicitis (V12 79) (Z87 19)   · History of back pain (V13 59) (Z87 39)   · History of cancer (V10 90) (Z85 9)   · History of chronic obstructive lung disease (V12 69) (Z87 09)   · History of diabetes mellitus (V12 29) (Z86 39)   · History of erectile dysfunction (V13 89) (H14 332)   · History of hematuria (V13 09) (Z87 448)   · History of hemorrhoids (V13 89) (Z87 19)   · History of hypertension (V12 59) (Z86 79)   · History of measles (V12 09) (Z86 19)   · History of mumps (V12 09) (Z86 19)   · History of pneumonia (V12 61) (Z87 01)   · History of varicella (V12 09) (Z86 19)   · History of Prostate Cancer (V10 46)    The active problems and past medical history were reviewed and updated today  Surgical History    · History of Appendectomy   · History of Diagnostic Cystoscopy   · History of Prostate Surgery    The surgical history was reviewed and updated today  Family History    · Family history of cerebrovascular accident (CVA) (V17 1) (Z82 3)   · Family history of hypertension (V17 49) (Z82 49)   · Denied: Family history of mental disorder    · Family history of asthma (V17 5) (Z82 5)   · Family history of lung disease (V19 8) (Z83 6)   · Family history of malignant neoplasm (V16 9) (Z80 9)   · Denied: Family history of mental disorder    The family history was reviewed and updated today  Social History    · Caffeine use (V49 89) (F15 90)   · Exercises rarely (V49 89) (Z78 9)   · Former smoker (V15 82) (G71 623)   · Lives with family   ·    · Denied: History of Never a smoker   · Never uses seat belt   · No alcohol use   · No living will   · No Caodaism beliefs   · Denied: History of No secondhand smoke exposure   · Denied: History of Recreational drug use   · Retired  The social history was reviewed and updated today  The social history was reviewed and is unchanged  Current Meds   1  Albuterol Sulfate (2 5 MG/3ML) 0 083% Inhalation Nebulization Solution; USE 1 UNIT   DOSE EVERY 4-6 HOURS AS NEEDED FOR WHEEZING ; Therapy: 08Apr2014 to (Last Rx:08Apr2014)  Requested for: 08Apr2014 Ordered   2  Albuterol Sulfate (2 5 MG/3ML) 0 083% Inhalation Nebulization Solution; USE 1 UNIT   DOSE EVERY 4-6 HOURS AS NEEDED FOR WHEEZING ; Therapy: 19VCI0293 to (Last Rx:21Mar2017)  Requested for: 21Mar2017 Ordered   3  Aspirin EC 81 MG Oral Tablet Delayed Release; take 1 tablet by mouth every day; Therapy: 86HQK4461 to Recorded   4  BD Insulin Syringe U-500 31G X 6MM 0 5 ML Miscellaneous; Use 3 per day as directed   with U-500 insulin;    Therapy: 38OUT2205 to (Evaluate:02Wku4623) Requested for: 59Jhg4763; Last   Rx:20Mpy6027 Ordered   5  Budesonide 0 5 MG/2ML Inhalation Suspension; USE 1 UNIT DOSE VIA NEBULIZER   TWO TIMES A DAY; Therapy: 29Apr2016 to (Last Rx:29Apr2016)  Requested for: 29Apr2016 Ordered   6  Cyclobenzaprine HCl - 10 MG Oral Tablet; One Tablet twice daily  Requested for:   78KVS7894; Last Rx:19Jan2017 Ordered   7  Fish Oil CAPS; One capsule twice daily; Therapy: (Blanquita Mend) to Recorded   8  Furosemide 40 MG Oral Tablet; Therapy: (Recorded:20Nov2017) to Recorded   9  Gabapentin 300 MG Oral Capsule; Take 1 capsule twice daily; Therapy: (Recorded:20Nov2017) to Recorded   10  HumuLIN R U-500 (CONCENTRATED) 500 UNIT/ML Subcutaneous Solution; INJECT 55    UNITS SQ at 8 AM, 55 UNITS at 12 PM, 45    UNITS at 6PM    ****USE WITH U-500 SYRINGE ONLY***;    Therapy: 08DAU4695 to (Evaluate:33Xgk9886)  Requested for: 15Aug2017; Last    Rx:15Aug2017 Ordered   11  Iron (Ferrous Sulfate) 142 (45 Fe) MG Oral Tablet Extended Release; Therapy: (Recorded:20Nov2017) to Recorded   12  Metoprolol Tartrate 25 MG Oral Tablet; Take 1 tablet twice daily  Requested for:    78SLA6779; Last Rx:19Jan2017 Ordered   13  OneTouch Ultra Blue In [employer]; Check BG 4x per day; Therapy: 71GBG0199 to (Evaluate:90Bje8253)  Requested for: 53YXD3274; Last    Rx:19Jan2017 Ordered   14  Oxycodone-Acetaminophen 7 5-325 MG Oral Tablet; TAKE 1 TABLET EVERY 4 HOURS    AS NEEDED FOR BREAKTHROUGH PAIN;    Therapy: 19QZT9681 to (Evaluate:31Mar2017); Last Rx:21Mar2017 Ordered   15  Pantoprazole Sodium 40 MG Oral Tablet Delayed Release; Therapy: (Recorded:20Nov2017) to Recorded   16  Potassium Chloride ER 10 MEQ Oral Tablet Extended Release; Therapy: (Recorded:20Nov2017) to Recorded   17  Simvastatin 10 MG Oral Tablet; TAKE 1 TABLET DAILY; Therapy: 29Apr2016 to Recorded   18  Spiriva Respimat 2 5 MCG/ACT Inhalation Aerosol Solution; INHALE 2 PUFFS ONCE    DAILY;     Therapy: 63VXM9859 to (Last Rx: 84OUN1601)  Requested for: 99PRK1997 Ordered   19  Stiolto Respimat 2 5-2 5 MCG/ACT Inhalation Aerosol Solution; 2 puffs once a day; Therapy: 03ORA9925 to (Last Rx:21Mar2017)  Requested for: 21Mar2017 Ordered    The medication list was reviewed and updated today  Allergies    1  MetFORMIN HCl TABS    Vitals   Recorded: 20Nov2017 09:46AM   Heart Rate 84, Apical   Systolic 075, LUE, Sitting   Diastolic 76, LUE, Sitting   Height 6 ft    Weight 278 lb 7 oz   BMI Calculated 37 76   BSA Calculated 2 45     Physical Exam    Constitutional   General appearance: No acute distress, well appearing and well nourished  Eyes   Conjunctiva and Sclera examination: Conjunctiva pink, sclera anicteric  Ears, Nose, Mouth, and Throat - External inspection of ears and nose: Normal without deformities or discharge  Neck   Neck and thyroid: Normal, supple, trachea midline, no thyromegaly  Pulmonary   Respiratory effort: No increased work of breathing or signs of respiratory distress  Auscultation of lungs: Clear to auscultation, no rales, no rhonchi, no wheezing, good air movement  Cardiovascular   Auscultation of heart: Normal rate and rhythm, normal S1 and S2, no murmurs  Carotid pulses: Normal, 2+ bilaterally  Examination of extremities for edema and/or varicosities: Abnormal   bilateral ankle Trace+ pitting edema  Chest - Chest: Normal    Abdomen   Abdomen: Non-tender and no distention  Musculoskeletal Gait and station: Normal gait  Skin - Skin and subcutaneous tissue: Normal without rashes or lesions  Skin is warm and well perfused, normal turgor  Neurologic - Speech: Normal     Psychiatric - Orientation to person, place, and time: Normal  Mood and affect: Normal       Results/Data    Rhythm and rate:  ventricular rate is 84 beats per minute  normal sinus rhythm  QRS: right bundle branch block      Assessment    1  Cor pulmonale (416 9) (I27 81)   2   Coronary artery disease involving native heart without angina pectoris (414 01) (I25 10)   3  Chronic obstructive pulmonary disease (496) (J44 9)   4  Dyslipidemia (272 4) (E78 5)   5  Essential hypertension (401 9) (I10)    Plan  Essential hypertension    · Follow-up visit in 4 Months Evaluation and Treatment  Follow-up  Status: Hold For -  Scheduling  Requested for: 14OIN8505   Ordered; For: Essential hypertension; Ordered By: Steffany Shi Performed:  Due: 60XKL4822  Health Maintenance, RBBB (right bundle branch block)    · EKG/ECG- POC; Status:Complete;   Done: 65WZR0469   Perform: In Office; [phone]; [facility]; 11/20/2017 9:47:23 AM;Ordered;  For:Health Maintenance, RBBB (right bundle branch block); Ordered By:Neal Hammer; Discussion/Summary    81year old man with coronary artery disease, COPD, and pulmonary hypertension, recently in the hospital in September and November - mostly due to breathing issues  Does have some pedal edema  No chest pain  Impression:  1  Dyspnea - multifactorial  Mostly pulmonary  2  Coronary artery disease - stable  3  Hypertension - controlled  4  Dyslipidemia - on statin  Recommendations:  1  Continue current medications  2  Follow up in 4 months  Thank you very much for allowing me to participate in the care of this patient  If you have any questions, please do not hesitate to contact me        Future Appointments    Signatures   Electronically signed by : ANUJ Means ; Nov 20 2017 10:02AM EST                       (Author)

## 2018-01-15 NOTE — PROGRESS NOTES
Assessment   1  Diabetes mellitus type 2, uncontrolled (250 02) (E11 65)  2  Dyslipidemia (272 4) (E78 5)  3  Chronic obstructive pulmonary disease (496) (J44 9)  4  Colon cancer screening (V76 51) (Z12 11)  5  Coronary artery disease involving native heart without angina pectoris (414 01) (I25 10)    Plan   Prostate cancer    · Renew: Oxycodone-Acetaminophen 7 5-325 MG Oral Tablet; TAKE 1 TABLET EVERY 6  HOURS AS NEEDED FOR PAIN    (1) OCCULT BLOOD, FECAL IMMUNOCHEMICAL TEST; Status:Resulted - Requires Verification;   Done: 65JJG0055 12:00AM  Due:12Qzq4085;Ordered; For:Colon cancer screening; Ordered By:Behler, Noberto Clover; Discussion/Summary    1  Patient is due for colonoscopy  He has a history of colon polyps  He would like to wait and not get a colonoscopy done at this time  I encouraged him to complete the testing for occult blood if he defers the colonoscopy at this time  2 Diabetes-prescription was given for diabetic foot and eye exams  3  History of coronary artery disease, COPD, Hypertension and hyperlipidemia-screening labs ordered  Cardiovascular screening and counseling: counseling was given on maintaining a healthy diet, counseling was given on maintaining a healthy weight, counseling was given on ways to improve cholesterol, counseling was given on ways to improve blood pressure, counseling was given on tobacco cessation and due for a lipid panel  Diabetes screening and counseling: the risks and benefits of screening were discussed, counseling was given on maintaining a healthy diet, counseling was given on maintaining a healthy weight and due for blood glucose  Colorectal cancer screening and counseling: the risks and benefits of screening were discussed, the patient declines screening and due for fecal occult blood testing  Immunizations: influenza vaccine is up to date this year  Tobacco Cessation: an intermediate session is done today  Patient is able to Self-Care     Possible side effects of new medications were reviewed with the patient/guardian today  The treatment plan was reviewed with the patient/guardian  The patient/guardian understands and agrees with the treatment plan      History of Present Illness  Welcome to Medicare and Wellness Visits: The patient is being seen for the initial annual wellness visit  Medicare Screening and Risk Factors   Hospitalizations: he has been previously hospitalizied  Once per lifetime medicare screening tests: AAA screening [place] has not yet been done  Medicare Screening Tests Risk Questions   Osteoporosis risk assessment: over 48years of age  HIV risk assessment: none indicated  Drug and Alcohol Use: The patient is a former cigarette smoker and has never used smokeless tobacco  He has declined tobacco cessation intervention  The patient reports rare alcohol use  Alcohol concern:   The patient has no concerns about alcohol abuse  He has declined alcohol treatment  He has previously used illicit drugs  He has declined drug treatment  Diet and Physical Activity: Current diet includes low carbohydrate food choices, 2-3 servings of fruit per day, 2 servings of vegetables per day, 2-3 servings of meat per day, 1-2 servings of whole grains per day, 3 servings of simple carbohydrates per day, 2 servings of dairy products per day, 2 cups of coffee per day, 2 cups of tea per day and 1 cans of diet soda per day  The patient does not exercise  Mood Disorder and Cognitive Impairment Screening: PHQ-9 Depression Scale   Depression screening  mild to moderate symptoms  He reports feeling down, depressed, or hopeless over the past two weeks  He denies feeling little interest or pleasure in doing things over the past two weeks     Cognitive impairment screening: denies difficulty learning/retaining new information, denies difficulty handling complex tasks, denies difficulty with reasoning, denies difficulty with spatial ability and orientation, denies difficulty with language and denies difficulty with behavior  Functional Ability/Level of Safety: Hearing is slightly decreased, slightly decreased in the right ear, slightly decreased in the left ear and a hearing aid is not used  The patient is currently able to do activities of daily living with limitations, able to do instrumental activities of daily living with limitations, able to participate in social activities with limitations and unable to drive  Activities of daily living details: transportation help needed, needs help shopping and needs help managing money, but does not need help using the phone, no meal preparation help needed, does not need help doing housework, does not need help doing laundry and does not need help managing medications  Fall risk factors:  polypharmacy and previous fall, but The patient fell 1 times in the past 12 months  Home safety risk factors:  no grab bars in the bathroom, but no unfamiliar surroundings, no loose rugs, no poor household lighting, no uneven floors, no household clutter and handrails on the stairs  Advance Directives: Advance directives: no living will and no durable power of  for health care directives  end of life decisions were reviewed with the patient and I agree with the patient's decisions  Co-Managers and Medical Equipment/Suppliers: See Patient Care Team   Preventive Quality Program 65 and Older: Falls Risk: The patient fell 1 times in the past 12 months  The patient currently has no urinary incontinence symptoms  Patient Care Team    Care Team Member Role Specialty Office Number   Mikayla Torres DO Specialist Pulmonary Medicine [phone]   Funmilayo Dallas, [address] [place] [address], [address] [place] [address] Specialist  [phone]   Silas Jessica MD  Urology [phone]   Ebony Patel MD  Internal Medicine [phone]   Chris REICH  Internal Medicine [phone]   Fidencio REICH    Cardiology [phone] Review of Systems    Constitutional: negative  Head and Face: negative  Eyes: negative  ENT: negative  Cardiovascular: lightheadedness, but no chest pain, no palpitations, the heart is not racing and no lower extremity edema  Respiratory: negative  Gastrointestinal: negative  Genitourinary: negative  Musculoskeletal: negative  Neurological: dizziness and Vertigo  Psychiatric: negative  Active Problems   1  Chronic obstructive pulmonary disease (496) (J44 9)  2  Colon cancer screening (V76 51) (Z12 11)  3  Cor pulmonale (416 9) (I27 81)  4  Coronary artery disease involving native heart without angina pectoris (414 01) (I25 10)  5  Diabetes mellitus type 2, uncontrolled (250 02) (E11 65)  6  Dyslipidemia (272 4) (E78 5)  7  Essential hypertension (401 9) (I10)  8  GERD (gastroesophageal reflux disease) (530 81) (K21 9)  9  Gross hematuria (599 71) (R31 0)  10  Hypoglycemia associated with diabetes (250 80) (E11 649)  11  Morbid obesity with BMI of 40 0-44 9, adult (278 01,V85 41) (E66 01,Z68 41)  12  Nicotine dependence (305 1) (F17 200)  13  Obstructive sleep apnea (327 23) (G47 33)  14  Other nonspecific abnormal finding of lung field (793 19) (R91 8)  15  Pleural Effusion (511 9)  16  Prostate cancer (185) (C61)  17  RBBB (right bundle branch block) (426 4) (I45 10)  18   Vertigo (780 4) (R42)    Past Medical History    · History of Acute UTI (599 0) (N39 0)   · Chronic obstructive pulmonary disease (496) (J44 9)   · History of Coronary Artery Disease (V12 59)   · History of appendicitis (V12 79) (Z87 19)   · History of back pain (V13 59) (Z87 39)   · History of cancer (V10 90) (Z85 9)   · History of chronic obstructive lung disease (V12 69) (Z87 09)   · History of diabetes mellitus (V12 29) (Z86 39)   · History of erectile dysfunction (V13 89) (H88 393)   · History of hematuria (V13 09) (Z87 448)   · History of hemorrhoids (V13 89) (Z87 19)   · History of hypertension (V12 59) (Z86 79)   · History of measles (V12 09) (Z86 19)   · History of mumps (V12 09) (Z86 19)   · History of pneumonia (V12 61) (Z87 01)   · History of varicella (V12 09) (Z86 19)   · History of Prostate Cancer (V10 46)    The active problems and past medical history were reviewed and updated today  Surgical History    · History of Appendectomy   · History of Prostate Surgery    The surgical history was reviewed and updated today  Family History  Mother    · Family history of cerebrovascular accident (CVA) (V17 1) (Z82 3)   · Family history of hypertension (V17 49) (Z82 49)   · Denied: Family history of mental disorder  Father    · Family history of asthma (V17 5) (Z82 5)   · Family history of lung disease (V19 8) (Z83 6)   · Family history of malignant neoplasm (V16 9) (Z80 9)   · Denied: Family history of mental disorder    The family history was reviewed and updated today  Social History    · Caffeine use (V49 89) (F15 90)   · Exercises rarely (V49 89) (Z78 9)   · Former smoker (T08 65) (J92 964)   · Has Quit 3 weeks ago (March 2013)   Smoked for 56 years, two packs of cigarettes a      day   Gradually dropped down to a pack a day and before he quit, was down to a pack      of cigarettes a week  · Lives with family   ·    · History of Never a smoker   · Never uses seat belt   · No alcohol use   · No living will   · No Jainism beliefs   · History of No secondhand smoke exposure (V49 89) (Z78 9)   · Denied: History of Recreational drug use   · Retired  The social history was reviewed and updated today  The social history was reviewed and is unchanged  Current Meds  1  Accu-Chek Makenzie Plus In Vitro Strip; TEST 4 TIMES DAILY; Therapy: 15CRP2258 to (Evaluate:02Oct2017)  Requested for: 99JUK0425; Last   Rx:94Scg0638 Ordered  2  Albuterol Sulfate (2 5 MG/3ML) 0 083% Inhalation Nebulization Solution; USE 1 UNIT   DOSE EVERY 4-6 HOURS AS NEEDED FOR WHEEZING ;    Therapy: 08Apr2014 to (Last Rx:08Apr2014)  Requested for: 08Apr2014 Ordered  3  Aspirin EC 81 MG Oral Tablet Delayed Release; take 1 tablet by mouth every day; Therapy: 97NDY3446 to Recorded  4  Budesonide 0 5 MG/2ML Inhalation Suspension; USE 1 UNIT DOSE VIA NEBULIZER   TWO TIMES A DAY; Therapy: 29Apr2016 to (Last Rx:29Apr2016)  Requested for: 29Apr2016 Ordered  5  Cyclobenzaprine HCl - 10 MG Oral Tablet; One Tablet twice daily; Therapy: (Comer[place]­o Stacia) to Recorded  6  Fish Oil CAPS; One capsule twice daily; Therapy: (ComerÃ­o Balding) to Recorded  7  HumuLIN R U-500 (CONCENTRATED) 500 UNIT/ML Subcutaneous Solution; INJECT   50 UNITS(0 1ML) SUBCUTANEOUSLY EVERY 6 HOURS AS DIRECTED; Therapy: 48VDX5720 to (Evaluate:06Jun2017); Last Rx:12Mcz9224 Ordered  8  Lisinopril 5 MG Oral Tablet; TAKE 1 TABLET DAILY; Last Rx:05Euc4145 Ordered  9  MetOLazone 2 5 MG Oral Tablet; Therapy: (Recorded:61Cvm7303) to Recorded  10  Metoprolol Tartrate 25 MG Oral Tablet; Take 1 tablet twice daily; Therapy: (Recorded:23Dzb1398) to Recorded  11  Oxycodone-Acetaminophen 7 5-325 MG Oral Tablet; TAKE 1 TABLET EVERY 6 HOURS    AS NEEDED FOR PAIN; Last Rx:32Ipo6362 Ordered  12  PredniSONE 10 MG Oral Tablet; TAKE 1 TABLET DAILY; Therapy: 30Emz5841 to (Evaluate:12Jan2017)  Requested for: 97Jnv8812; Last    Rx:76Sbf9336 Ordered  13  Simvastatin 10 MG Oral Tablet; TAKE 1 TABLET DAILY; Therapy: 29Apr2016 to Recorded    The medication list was reviewed and updated today  Allergies   1  MetFORMIN HCl TABS    Immunizations   1 2    Influenza  01-Sep-2013 01-Sep-2016     Vitals  Signs    Heart Rate: 49  Respiration: 20  Systolic: 648  Diastolic: 68  Height: 5 ft 8 5 in  Weight: 272 lb   BMI Calculated: 40 76  BSA Calculated: 2 34  O2 Saturation: 98    Physical Exam    Constitutional   General appearance: Abnormal   appears older than stated age  Head and Face   Head and face: Normal     Palpation of the face and sinuses: No sinus tenderness  Ears, Nose, Mouth, and Throat   External inspection of ears and nose: Normal     Otoscopic examination: Tympanic membranes translucent with normal light reflex  Canals patent without erythema  Nasal mucosa, septum, and turbinates: Normal without edema or erythema  Lips, teeth, and gums: Normal, good dentition  Oropharynx: Normal with no erythema, edema, exudate or lesions  Neck   Neck: Supple, symmetric, trachea midline, no masses  Thyroid: Normal, no thyromegaly  Pulmonary   Respiratory effort: No increased work of breathing or signs of respiratory distress  Palpation of chest: Normal     Auscultation of lungs: Clear to auscultation  Cardiovascular   Auscultation of heart: Normal rate and rhythm, normal S1 and S2, no murmurs  Carotid pulses: 2+ bilaterally  Pedal pulses: 2+ bilaterally  Examination of extremities for edema and/or varicosities: Normal     Abdomen   Abdomen: Non-tender, no masses  Liver and spleen: No hepatomegaly or splenomegaly  Lymphatic   Palpation of lymph nodes in neck: No lymphadenopathy  Musculoskeletal   Gait and station: Normal     Neurologic   Cranial nerves: Cranial nerves 2-12 intact  Reflexes: 2+ and symmetric  Sensation: No sensory loss  Psychiatric   Judgment and insight: Normal     Orientation to person, place and time: Normal     Recent and remote memory: Intact  Mood and affect: Normal        Results/Data  PHQ-2 Adult Depression Screening 00Fhl3377 10:57AM User, s     Test Name Result Flag Reference   PHQ-2 Adult Depression Score 0     Over the last two weeks, how often have you been bothered by any of the following problems?   Little interest or pleasure in doing things: Not at all - 0  Feeling down, depressed, or hopeless: Not at all - 0   PHQ-2 Adult Depression Screening Negative         Future Appointments    Date/Time Provider Specialty Site   01/19/2017 10:40 AM Carolina Goetz [facility] PRIMARY CARE   03/21/2017 10:00 AM Mike Rock DO Pulmonary Medicine [facility] [place]   01/19/2017 11:00 AM Markell Scherer [facility] ENDOCRINOLOGY   12/23/2016 09:30 AM Viky Farmer MD Urology [address]     Signatures   Electronically signed by : Malia Merino, [facility]; Dec 19 2016 11:21AM EST                       (Author)    Electronically signed by : ANUJ Eli ; Dec 23 2016  8:25AM EST                       (Author)

## 2018-01-18 NOTE — MISCELLANEOUS
Message  Patient is unable to afford U-500 insulin  Current dose if 50 units every 6 hours  They are getting new plan Jan 1st and will check what is covered  He has enough insulin to last until tomorrow  For now---  Switch to J Carlos Sat U-200: 80 units daily  Humalog U-200: 25 units before each meal    he doesn't know how to use pens  He is goign to stop by tomorrow at 9:30AM to learn how to use pen and  samples until he can figure out what is covered, he will check formulary  ADDENDUM:   He has only been taking 50 units 3x per day   units daily  This morning BG was 95, checked at visit after breakfast and it was 162  Insulin hasn't been taken since last night  Blood sugars range from  over past few days     For now, will start  Tresiba 60 units daily  Humalog U200 20 units before meals  Tresiba given by patient at visit  Instructed on pen use1        1 Amended By: James Self; Dec 30 2016 9:56 AM EST    Signatures   Electronically signed by : Stefanie Yan, [facility]; Dec 30 2016  9:58AM EST                       (Author)

## 2018-01-22 VITALS — SYSTOLIC BLOOD PRESSURE: 144 MMHG | RESPIRATION RATE: 20 BRPM | DIASTOLIC BLOOD PRESSURE: 70 MMHG | HEART RATE: 88 BPM

## 2018-01-22 VITALS
HEIGHT: 74 IN | DIASTOLIC BLOOD PRESSURE: 70 MMHG | SYSTOLIC BLOOD PRESSURE: 144 MMHG | HEART RATE: 96 BPM | WEIGHT: 279.25 LBS | BODY MASS INDEX: 35.84 KG/M2

## 2018-02-01 ENCOUNTER — HOSPITAL ENCOUNTER (INPATIENT)
Facility: HOSPITAL | Age: 73
LOS: 11 days | Discharge: NURSING, REHAB OR LONG-TERM CARE | DRG: 291 | End: 2018-02-12
Attending: EMERGENCY MEDICINE | Admitting: INTERNAL MEDICINE
Payer: COMMERCIAL

## 2018-02-01 ENCOUNTER — APPOINTMENT (EMERGENCY)
Dept: RADIOLOGY | Facility: HOSPITAL | Age: 73
DRG: 291 | End: 2018-02-01
Payer: COMMERCIAL

## 2018-02-01 DIAGNOSIS — N18.30 STAGE 3 CHRONIC KIDNEY DISEASE (HCC): ICD-10-CM

## 2018-02-01 DIAGNOSIS — E78.5 DYSLIPIDEMIA: Chronic | ICD-10-CM

## 2018-02-01 DIAGNOSIS — J96.11 CHRONIC RESPIRATORY FAILURE WITH HYPOXIA AND HYPERCAPNIA (HCC): Chronic | ICD-10-CM

## 2018-02-01 DIAGNOSIS — E11.8 TYPE 2 DIABETES MELLITUS WITH COMPLICATION, WITH LONG-TERM CURRENT USE OF INSULIN (HCC): ICD-10-CM

## 2018-02-01 DIAGNOSIS — R60.0 BILATERAL LOWER EXTREMITY EDEMA: Chronic | ICD-10-CM

## 2018-02-01 DIAGNOSIS — I87.2 VENOUS STASIS DERMATITIS OF BOTH LOWER EXTREMITIES: Chronic | ICD-10-CM

## 2018-02-01 DIAGNOSIS — I50.33 ACUTE ON CHRONIC DIASTOLIC CONGESTIVE HEART FAILURE (HCC): Primary | ICD-10-CM

## 2018-02-01 DIAGNOSIS — R26.2 AMBULATORY DYSFUNCTION: ICD-10-CM

## 2018-02-01 DIAGNOSIS — J96.12 CHRONIC RESPIRATORY FAILURE WITH HYPOXIA AND HYPERCAPNIA (HCC): Chronic | ICD-10-CM

## 2018-02-01 DIAGNOSIS — Z79.4 TYPE 2 DIABETES MELLITUS WITH COMPLICATION, WITH LONG-TERM CURRENT USE OF INSULIN (HCC): ICD-10-CM

## 2018-02-01 DIAGNOSIS — I21.4 NSTEMI (NON-ST ELEVATED MYOCARDIAL INFARCTION) (HCC): ICD-10-CM

## 2018-02-01 DIAGNOSIS — Z86.79 HISTORY OF CORONARY ARTERY DISEASE: ICD-10-CM

## 2018-02-01 PROBLEM — K29.80 DUODENITIS: Status: RESOLVED | Noted: 2017-09-28 | Resolved: 2018-02-01

## 2018-02-01 PROBLEM — R77.8 ELEVATED TROPONIN: Status: ACTIVE | Noted: 2018-02-01

## 2018-02-01 PROBLEM — E87.1 HYPONATREMIA: Status: RESOLVED | Noted: 2017-07-24 | Resolved: 2018-02-01

## 2018-02-01 LAB
ALBUMIN SERPL BCP-MCNC: 3.1 G/DL (ref 3.5–5)
ALP SERPL-CCNC: 72 U/L (ref 46–116)
ALT SERPL W P-5'-P-CCNC: 46 U/L (ref 12–78)
ANION GAP SERPL CALCULATED.3IONS-SCNC: 0 MMOL/L (ref 4–13)
ANION GAP SERPL CALCULATED.3IONS-SCNC: 3 MMOL/L (ref 4–13)
AST SERPL W P-5'-P-CCNC: 55 U/L (ref 5–45)
BASOPHILS # BLD AUTO: 0.01 THOUSANDS/ΜL (ref 0–0.1)
BASOPHILS NFR BLD AUTO: 0 % (ref 0–1)
BILIRUB SERPL-MCNC: 0.27 MG/DL (ref 0.2–1)
BUN SERPL-MCNC: 18 MG/DL (ref 5–25)
BUN SERPL-MCNC: 19 MG/DL (ref 5–25)
CALCIUM SERPL-MCNC: 8.2 MG/DL (ref 8.3–10.1)
CALCIUM SERPL-MCNC: 8.3 MG/DL (ref 8.3–10.1)
CHLORIDE SERPL-SCNC: 100 MMOL/L (ref 100–108)
CHLORIDE SERPL-SCNC: 96 MMOL/L (ref 100–108)
CO2 SERPL-SCNC: 37 MMOL/L (ref 21–32)
CO2 SERPL-SCNC: 41 MMOL/L (ref 21–32)
CREAT SERPL-MCNC: 1.43 MG/DL (ref 0.6–1.3)
CREAT SERPL-MCNC: 1.46 MG/DL (ref 0.6–1.3)
EOSINOPHIL # BLD AUTO: 0.03 THOUSAND/ΜL (ref 0–0.61)
EOSINOPHIL NFR BLD AUTO: 0 % (ref 0–6)
ERYTHROCYTE [DISTWIDTH] IN BLOOD BY AUTOMATED COUNT: 17.2 % (ref 11.6–15.1)
GFR SERPL CREATININE-BSD FRML MDRD: 48 ML/MIN/1.73SQ M
GFR SERPL CREATININE-BSD FRML MDRD: 49 ML/MIN/1.73SQ M
GLUCOSE SERPL-MCNC: 123 MG/DL (ref 65–140)
GLUCOSE SERPL-MCNC: 123 MG/DL (ref 65–140)
GLUCOSE SERPL-MCNC: 95 MG/DL (ref 65–140)
HCT VFR BLD AUTO: 43.8 % (ref 36.5–49.3)
HGB BLD-MCNC: 13 G/DL (ref 12–17)
LYMPHOCYTES # BLD AUTO: 0.76 THOUSANDS/ΜL (ref 0.6–4.47)
LYMPHOCYTES NFR BLD AUTO: 7 % (ref 14–44)
MAGNESIUM SERPL-MCNC: 2.3 MG/DL (ref 1.6–2.6)
MCH RBC QN AUTO: 24.8 PG (ref 26.8–34.3)
MCHC RBC AUTO-ENTMCNC: 29.7 G/DL (ref 31.4–37.4)
MCV RBC AUTO: 84 FL (ref 82–98)
MONOCYTES # BLD AUTO: 0.77 THOUSAND/ΜL (ref 0.17–1.22)
MONOCYTES NFR BLD AUTO: 7 % (ref 4–12)
NEUTROPHILS # BLD AUTO: 10.07 THOUSANDS/ΜL (ref 1.85–7.62)
NEUTS SEG NFR BLD AUTO: 86 % (ref 43–75)
NRBC BLD AUTO-RTO: 0 /100 WBCS
NT-PROBNP SERPL-MCNC: 1580 PG/ML
PLATELET # BLD AUTO: 164 THOUSANDS/UL (ref 149–390)
PLATELET # BLD AUTO: 172 THOUSANDS/UL (ref 149–390)
PMV BLD AUTO: 9.2 FL (ref 8.9–12.7)
PMV BLD AUTO: 9.7 FL (ref 8.9–12.7)
POTASSIUM SERPL-SCNC: 4.2 MMOL/L (ref 3.5–5.3)
POTASSIUM SERPL-SCNC: 4.7 MMOL/L (ref 3.5–5.3)
PROT SERPL-MCNC: 7.8 G/DL (ref 6.4–8.2)
RBC # BLD AUTO: 5.24 MILLION/UL (ref 3.88–5.62)
SODIUM SERPL-SCNC: 137 MMOL/L (ref 136–145)
SODIUM SERPL-SCNC: 140 MMOL/L (ref 136–145)
TROPONIN I SERPL-MCNC: 0.12 NG/ML
TROPONIN I SERPL-MCNC: 0.27 NG/ML
WBC # BLD AUTO: 11.69 THOUSAND/UL (ref 4.31–10.16)

## 2018-02-01 PROCEDURE — 93005 ELECTROCARDIOGRAM TRACING: CPT

## 2018-02-01 PROCEDURE — 83880 ASSAY OF NATRIURETIC PEPTIDE: CPT | Performed by: EMERGENCY MEDICINE

## 2018-02-01 PROCEDURE — 71046 X-RAY EXAM CHEST 2 VIEWS: CPT

## 2018-02-01 PROCEDURE — 80048 BASIC METABOLIC PNL TOTAL CA: CPT | Performed by: INTERNAL MEDICINE

## 2018-02-01 PROCEDURE — 83735 ASSAY OF MAGNESIUM: CPT | Performed by: INTERNAL MEDICINE

## 2018-02-01 PROCEDURE — 84484 ASSAY OF TROPONIN QUANT: CPT | Performed by: EMERGENCY MEDICINE

## 2018-02-01 PROCEDURE — 85049 AUTOMATED PLATELET COUNT: CPT | Performed by: INTERNAL MEDICINE

## 2018-02-01 PROCEDURE — 36415 COLL VENOUS BLD VENIPUNCTURE: CPT | Performed by: EMERGENCY MEDICINE

## 2018-02-01 PROCEDURE — 99285 EMERGENCY DEPT VISIT HI MDM: CPT

## 2018-02-01 PROCEDURE — 99223 1ST HOSP IP/OBS HIGH 75: CPT | Performed by: INTERNAL MEDICINE

## 2018-02-01 PROCEDURE — 85025 COMPLETE CBC W/AUTO DIFF WBC: CPT | Performed by: EMERGENCY MEDICINE

## 2018-02-01 PROCEDURE — 80053 COMPREHEN METABOLIC PANEL: CPT | Performed by: EMERGENCY MEDICINE

## 2018-02-01 PROCEDURE — 84484 ASSAY OF TROPONIN QUANT: CPT | Performed by: INTERNAL MEDICINE

## 2018-02-01 PROCEDURE — 82948 REAGENT STRIP/BLOOD GLUCOSE: CPT

## 2018-02-01 RX ORDER — BUDESONIDE AND FORMOTEROL FUMARATE DIHYDRATE 160; 4.5 UG/1; UG/1
2 AEROSOL RESPIRATORY (INHALATION) 2 TIMES DAILY
Status: DISCONTINUED | OUTPATIENT
Start: 2018-02-01 | End: 2018-02-12 | Stop reason: HOSPADM

## 2018-02-01 RX ORDER — CYCLOBENZAPRINE HCL 10 MG
10 TABLET ORAL
Status: DISCONTINUED | OUTPATIENT
Start: 2018-02-01 | End: 2018-02-05

## 2018-02-01 RX ORDER — ASPIRIN 81 MG/1
324 TABLET, CHEWABLE ORAL ONCE
Status: COMPLETED | OUTPATIENT
Start: 2018-02-01 | End: 2018-02-01

## 2018-02-01 RX ORDER — ALBUTEROL SULFATE 2.5 MG/3ML
2.5 SOLUTION RESPIRATORY (INHALATION) EVERY 4 HOURS PRN
Status: DISCONTINUED | OUTPATIENT
Start: 2018-02-01 | End: 2018-02-12 | Stop reason: HOSPADM

## 2018-02-01 RX ORDER — PRAVASTATIN SODIUM 40 MG
40 TABLET ORAL
Status: DISCONTINUED | OUTPATIENT
Start: 2018-02-02 | End: 2018-02-12 | Stop reason: HOSPADM

## 2018-02-01 RX ORDER — FERROUS SULFATE 325(65) MG
325 TABLET ORAL
Status: DISCONTINUED | OUTPATIENT
Start: 2018-02-02 | End: 2018-02-12 | Stop reason: HOSPADM

## 2018-02-01 RX ORDER — POTASSIUM CHLORIDE 750 MG/1
10 TABLET, EXTENDED RELEASE ORAL DAILY
Status: DISCONTINUED | OUTPATIENT
Start: 2018-02-02 | End: 2018-02-06

## 2018-02-01 RX ORDER — OXYCODONE HYDROCHLORIDE 5 MG/1
5 TABLET ORAL EVERY 4 HOURS PRN
Status: DISCONTINUED | OUTPATIENT
Start: 2018-02-01 | End: 2018-02-12 | Stop reason: HOSPADM

## 2018-02-01 RX ORDER — FUROSEMIDE 10 MG/ML
40 INJECTION INTRAMUSCULAR; INTRAVENOUS 2 TIMES DAILY
Status: DISCONTINUED | OUTPATIENT
Start: 2018-02-02 | End: 2018-02-08

## 2018-02-01 RX ORDER — ASPIRIN 81 MG/1
81 TABLET ORAL DAILY
Status: DISCONTINUED | OUTPATIENT
Start: 2018-02-02 | End: 2018-02-12 | Stop reason: HOSPADM

## 2018-02-01 RX ORDER — OXYCODONE HCL 5 MG/5 ML
10 SOLUTION, ORAL ORAL EVERY 4 HOURS PRN
Status: DISCONTINUED | OUTPATIENT
Start: 2018-02-01 | End: 2018-02-12 | Stop reason: HOSPADM

## 2018-02-01 RX ORDER — HEPARIN SODIUM 5000 [USP'U]/ML
5000 INJECTION, SOLUTION INTRAVENOUS; SUBCUTANEOUS EVERY 8 HOURS SCHEDULED
Status: DISCONTINUED | OUTPATIENT
Start: 2018-02-01 | End: 2018-02-12 | Stop reason: HOSPADM

## 2018-02-01 RX ORDER — PANTOPRAZOLE SODIUM 40 MG/1
40 TABLET, DELAYED RELEASE ORAL
Status: DISCONTINUED | OUTPATIENT
Start: 2018-02-02 | End: 2018-02-12 | Stop reason: HOSPADM

## 2018-02-01 RX ORDER — ACETAMINOPHEN 325 MG/1
650 TABLET ORAL EVERY 4 HOURS PRN
Status: DISCONTINUED | OUTPATIENT
Start: 2018-02-01 | End: 2018-02-05

## 2018-02-01 RX ORDER — GABAPENTIN 300 MG/1
300 CAPSULE ORAL
Status: DISCONTINUED | OUTPATIENT
Start: 2018-02-01 | End: 2018-02-05

## 2018-02-01 RX ORDER — FUROSEMIDE 10 MG/ML
40 INJECTION INTRAMUSCULAR; INTRAVENOUS ONCE
Status: COMPLETED | OUTPATIENT
Start: 2018-02-01 | End: 2018-02-01

## 2018-02-01 RX ADMIN — FUROSEMIDE 40 MG: 10 INJECTION, SOLUTION INTRAMUSCULAR; INTRAVENOUS at 15:57

## 2018-02-01 RX ADMIN — OXYCODONE HYDROCHLORIDE 10 MG: 5 SOLUTION ORAL at 22:11

## 2018-02-01 RX ADMIN — GABAPENTIN 300 MG: 300 CAPSULE ORAL at 21:58

## 2018-02-01 RX ADMIN — HEPARIN SODIUM 5000 UNITS: 5000 INJECTION, SOLUTION INTRAVENOUS; SUBCUTANEOUS at 21:58

## 2018-02-01 RX ADMIN — CYCLOBENZAPRINE HYDROCHLORIDE 10 MG: 10 TABLET, FILM COATED ORAL at 21:58

## 2018-02-01 RX ADMIN — BUDESONIDE AND FORMOTEROL FUMARATE DIHYDRATE 2 PUFF: 160; 4.5 AEROSOL RESPIRATORY (INHALATION) at 22:00

## 2018-02-01 RX ADMIN — METOPROLOL TARTRATE 25 MG: 25 TABLET ORAL at 21:58

## 2018-02-01 RX ADMIN — ASPIRIN 81 MG 324 MG: 81 TABLET ORAL at 15:00

## 2018-02-01 NOTE — ED ATTENDING ATTESTATION
I, Blayne Mcnair DO, saw and evaluated the patient  I have discussed the patient with the resident/non-physician practitioner and agree with the resident's/non-physician practitioner's findings, Plan of Care, and MDM as documented in the resident's/non-physician practitioner's note, except where noted  All available labs and Radiology studies were reviewed  At this point I agree with the current assessment done in the Emergency Department  I have conducted an independent evaluation of this patient a history and physical is as follows:      Critical Care Time  CritCare Time    Procedures     81 yr old male to the ED with sob with incr in JESUS  No chest pain  Ox has been decr  No URI sx  Some dietary indiscretions but no med changes  Seen by family doc and sent to the ED  Exm: heart: rrr   Lung: cta with Ox at 90 %  ABd: morbidly obese  3+ JESUS to knees  Abd: nontender  Pln: COPD / ACS /CHF vu and adm

## 2018-02-01 NOTE — ASSESSMENT & PLAN NOTE
· Continue outpatient medications  · Patient noted to be on U 500  · Monitor sugars with sliding scale coverage

## 2018-02-01 NOTE — ED PROVIDER NOTES
History  Chief Complaint   Patient presents with    Leg Swelling     Pt wife states "I took him to his primary doctor today and he has a bad toothache and swollen legs " Sent to ED for further evaluation of "fluid because of his heart "     74-year-old male with past medical history of CAD with prior MI and 3 stents; COPD; CHF with grade 1 diastolic dysfunction as of September 2017; chronic home oxygen dependence with baseline requirement of 2 5-3 L; diabetes mellitus; CKD stage 3; hypertension; and hyperlipidemia who is presenting with increasing shortness of breath and leg swelling  Patient states that the symptoms began several days to a week ago  Patient notes increasing swelling of his bilateral lower extremities with several areas of skin breakdown at and weeping clear fluid  Patient also reports worsening of his baseline shortness of breath  Patient states that he normally develops dyspnea even when walking around the house  He states that his typical baseline oxygen saturation is approximately 95% but this has decreased recently  Patient initially saw his PCP due to a 4 day history of a toothache  The toothache affects the lower front teeth  Patient has a history of poor dentition  Patient reports chronic back pain  This is unchanged  Patient specifically denies chest pain  He does admit to orthopnea but denies paroxysmal nocturnal dyspnea  Patient also reports a nonproductive cough  He reports that he is compliant with his medications  His wife does note that he has had increased salt intake recently  Review of systems is otherwise negative  Patient denies fever, chills, diaphoresis, unintentional weight loss, headache, vision changes, extremity numbness or weakness, chest pain or pressure, palpitations, cough, nausea, vomiting, abdominal pain, diarrhea, constipation, melena, hematochezia, dysuria, urinary frequency, and hematuria      Plan: CBC to evaluate for leukocytosis, anemia, and thrombocytopenia; CMP to evaluate for electrolyte abnormalities, renal function, and hepatobiliary pathology; troponin to evaluate for MI or other cause of myocardial injury; EKG to evaluate for arrhythmia and ischemia; and chest x-ray to evaluate for acute intrathoracic pathology and volume overload  BNP to evaluate for CHF  Most likely diagnosis is mild CHF exacerbation  Additional differential considerations include worsening of chronic kidney disease with resulting volume overload and myocardial ischemia  Cirrhosis unlikely though possible  Prior to Admission Medications   Prescriptions Last Dose Informant Patient Reported? Taking? albuterol (2 5 mg/3 mL) 0 083 % nebulizer solution 2/1/2018 at Unknown time  Yes Yes   Sig: Take 2 5 mg by nebulization Indications: 2-3 times a day PRN     aspirin (ECOTRIN LOW STRENGTH) 81 mg EC tablet 2/1/2018 at Unknown time  Yes Yes   Sig: Take 81 mg by mouth daily   budesonide-formoterol (SYMBICORT) 160-4 5 mcg/act inhaler 2/1/2018 at Unknown time  Yes Yes   Sig: Inhale 2 puffs 2 (two) times a day   cyclobenzaprine (FLEXERIL) 10 mg tablet 2/1/2018 at Unknown time  No Yes   Sig: Take 1 tablet by mouth daily at bedtime   ferrous sulfate 325 (65 Fe) mg tablet 2/1/2018 at Unknown time  Yes Yes   Sig: Take 325 mg by mouth daily with breakfast   furosemide (LASIX) 40 mg tablet 1/31/2018 at Unknown time  No Yes   Sig: Take 1 tablet by mouth daily   gabapentin (NEURONTIN) 300 mg capsule 2/1/2018 at Unknown time  Yes Yes   Sig: Take 300 mg by mouth daily at bedtime     insulin regular (HumuLIN R U-500) 500 units/mL CONCENTRATED injection 2/1/2018 at Unknown time  No Yes   Sig: Inject 0 11 mL under the skin daily before breakfast   insulin regular (HumuLIN R U-500) 500 units/mL CONCENTRATED injection 2/1/2018 at Unknown time  No Yes   Sig: Inject 0 07 mL under the skin daily before dinner   metoprolol tartrate (LOPRESSOR) 25 mg tablet 2/1/2018 at Unknown time  Yes Yes Sig: Take 25 mg by mouth 2 (two) times a day     oxyCODONE-acetaminophen (PERCOCET) 7 5-325 MG per tablet 2/1/2018 at Unknown time  Yes Yes   Sig: Take 1 tablet by mouth 2 (two) times a day  pantoprazole (PROTONIX) 40 mg tablet 2/1/2018 at Unknown time  No Yes   Sig: Take 1 tablet by mouth 2 (two) times a day before meals   potassium chloride (K-DUR,KLOR-CON) 10 mEq tablet 2/1/2018 at Unknown time  No Yes   Sig: Take 1 tablet by mouth daily   simvastatin (ZOCOR) 40 mg tablet 2/1/2018 at Unknown time  Yes Yes   Sig: Take 40 mg by mouth daily at bedtime  tiotropium (SPIRIVA) 18 mcg inhalation capsule 2/1/2018 at Unknown time  Yes Yes   Sig: Place 18 mcg into inhaler and inhale daily      Facility-Administered Medications: None       Past Medical History:   Diagnosis Date    Abdominal pain     Cardiac disease     [facility]chronic [facility] disease[facility] ([facility] )     Coronary artery disease     Diabetes mellitus ([facility] )     Hyperlipidemia     Hypertension     MI (myocardial infarction)     with 3 stents    Prostate cancer [facility])        Past Surgical History:   Procedure Laterality Date    ABDOMINAL SURGERY      exploratory    ANGIOPLASTY      3 stents    ESOPHAGOGASTRODUODENOSCOPY N/A 10/2/2017    Procedure: ESOPHAGOGASTRODUODENOSCOPY (EGD); Surgeon: Monique Cunningham MD;  Location: [facility]; Service: Gastroenterology    PROSTATE SURGERY         Family History   Problem Relation Age of Onset    Heart disease Father      I have reviewed and agree with the history as documented  Social History   Substance Use Topics    Smoking status: Former Smoker     Packs/day: 1 50     Years: 50 00     Quit date: 9/13/2017    Smokeless tobacco: Never Used    Alcohol use No        Review of Systems   Constitutional: Negative for diaphoresis, fever and unexpected weight change  HENT: Positive for dental problem  Negative for congestion, rhinorrhea and sore throat      Eyes: Negative for pain, discharge and visual disturbance  Respiratory: Positive for cough and shortness of breath  Negative for wheezing  Cardiovascular: Positive for leg swelling  Negative for chest pain and palpitations  Gastrointestinal: Negative for abdominal pain, blood in stool, constipation, diarrhea, nausea and vomiting  Genitourinary: Negative for dysuria, flank pain and hematuria  Musculoskeletal: Positive for back pain  Negative for arthralgias and myalgias  Skin: Negative for rash and wound  Allergic/Immunologic: Negative for environmental allergies and food allergies  Neurological: Negative for dizziness, seizures, weakness and numbness  Hematological: Negative for adenopathy  Psychiatric/Behavioral: Negative for confusion and hallucinations  Physical Exam  ED Triage Vitals [02/01/18 1318]   Temperature Pulse Respirations Blood Pressure SpO2   98 7 °F (37 1 °C) 94 18 130/60 90 %      Temp Source Heart Rate Source Patient Position - Orthostatic VS BP Location FiO2 (%)   Oral Monitor Sitting Left arm --      Pain Score       8           Orthostatic Vital Signs  Vitals:    02/01/18 1900 02/01/18 1902 02/01/18 1953 02/01/18 2330   BP: 149/72  (!) 189/90 135/63   Pulse: 91 91 99 78   Patient Position - Orthostatic VS:           Physical Exam   Constitutional: He is oriented to person, place, and time  He appears well-nourished  He has a sickly appearance  Nasal cannula in place  HENT:   Head: Normocephalic and atraumatic  Right Ear: External ear normal    Left Ear: External ear normal    Nose: Nose normal    Mouth/Throat: Abnormal dentition  Dental caries present  No dental abscesses  Patient's dentition is extremely poor with numerous missing teeth and caries on the remaining teeth  Eyes: EOM are normal  Pupils are equal, round, and reactive to light  Cardiovascular: Normal rate, regular rhythm and normal heart sounds  No murmur heard  Pulmonary/Chest: Effort normal  No respiratory distress  He has no wheezes  He has rales  Patient has bilateral rales to the mid lung field  No focal abnormalities  Abdominal: Soft  Bowel sounds are normal  There is no tenderness  Musculoskeletal: Normal range of motion  He exhibits edema  He exhibits no deformity  2+ pedal edema to the knee bilaterally  Neurological: He is alert and oriented to person, place, and time  Skin: Skin is warm and dry  He is not diaphoretic  Stasis changes of the bilateral calves  Areas of skin breakdown with weeping of clear fluid  Psychiatric: He has a normal mood and affect  His behavior is normal  Judgment and thought content normal    Nursing note and vitals reviewed        ED Medications  Medications   albuterol inhalation solution 2 5 mg (not administered)   aspirin (ECOTRIN LOW STRENGTH) EC tablet 81 mg (not administered)   budesonide-formoterol (SYMBICORT) 160-4 5 mcg/act inhaler 2 puff (2 puffs Inhalation Given 2/1/18 2200)   cyclobenzaprine (FLEXERIL) tablet 10 mg (10 mg Oral Given 2/1/18 2158)   ferrous sulfate tablet 325 mg (not administered)   gabapentin (NEURONTIN) capsule 300 mg (300 mg Oral Given 2/1/18 2158)   insulin regular (HumuLIN R U-500) 500 units/mL CONCENTRATED injection 35 Units (not administered)   metoprolol tartrate (LOPRESSOR) tablet 25 mg (25 mg Oral Given 2/1/18 2158)   potassium chloride (K-DUR,KLOR-CON) CR tablet 10 mEq (not administered)   pantoprazole (PROTONIX) EC tablet 40 mg (not administered)   pravastatin (PRAVACHOL) tablet 40 mg (not administered)   tiotropium (SPIRIVA) capsule for inhaler 18 mcg (not administered)   furosemide (LASIX) injection 40 mg (not administered)   heparin (porcine) subcutaneous injection 5,000 Units (5,000 Units Subcutaneous Given 2/1/18 2158)   insulin lispro (HumaLOG) 100 units/mL subcutaneous injection 1-5 Units (not administered)   insulin lispro (HumaLOG) 100 units/mL subcutaneous injection 1-5 Units (1 Units Subcutaneous Not Given 2/1/18 2158)   acetaminophen (TYLENOL) tablet 650 mg (not administered)   oxyCODONE (ROXICODONE) IR tablet 5 mg (not administered)   oxyCODONE (ROXICODONE) oral solution 10 mg (10 mg Oral Given 2/1/18 2211)   insulin regular (HumuLIN R U-500) 500 units/mL CONCENTRATED injection 55 Units (not administered)   aspirin chewable tablet 324 mg (324 mg Oral Given 2/1/18 1500)   furosemide (LASIX) injection 40 mg (40 mg Intravenous Given 2/1/18 1557)       Diagnostic Studies  Results Reviewed     Procedure Component Value Units Date/Time    Comprehensive metabolic panel [[id number]]  (Abnormal) Collected:  02/01/18 1400    Lab Status:  Final result Specimen:  Blood from Arm, Right Updated:  02/01/18 1427     Sodium 140 mmol/L      Potassium 4 7 mmol/L      Chloride 100 mmol/L      CO2 37 (H) mmol/L      Anion Gap 3 (L) mmol/L      BUN 19 mg/dL      Creatinine 1 46 (H) mg/dL      Glucose 95 mg/dL      Calcium 8 2 (L) mg/dL      AST 55 (H) U/L      ALT 46 U/L      Alkaline Phosphatase 72 U/L      Total Protein 7 8 g/dL      Albumin 3 1 (L) g/dL      Total Bilirubin 0 27 mg/dL      eGFR 48 ml/min/1 73sq m     Narrative:         National Kidney Disease Education Program recommendations are as follows:  GFR calculation is accurate only with a steady state creatinine  Chronic Kidney disease less than 60 ml/min/1 73 sq  meters  Kidney failure less than 15 ml/min/1 73 sq  meters      BNP [[id number]]  (Abnormal) Collected:  02/01/18 1400    Lab Status:  Final result Specimen:  Blood from Arm, Right Updated:  02/01/18 1427     NT-proBNP 1,580 (H) pg/mL     Troponin I [[id number]]  (Abnormal) Collected:  02/01/18 1400    Lab Status:  Final result Specimen:  Blood from Arm, Right Updated:  02/01/18 1427     Troponin I 0 12 (H) ng/mL     Narrative:         [employer] Chemistry analyzer 99% cutoff is > 0 04 ng/mL in network labs    o cTnI 99% cutoff is useful only when applied to patients in the clinical setting of myocardial ischemia  o cTnI 99% cutoff should be interpreted in the context of clinical history, ECG findings and possibly cardiac imaging to establish correct diagnosis  o cTnI 99% cutoff may be suggestive but clearly not indicative of a coronary event without the clinical setting of myocardial ischemia  CBC and differential [[id number]]  (Abnormal) Collected:  02/01/18 1400    Lab Status:  Final result Specimen:  Blood from Arm, Right Updated:  02/01/18 1408     WBC 11 69 (H) Thousand/uL      RBC 5 24 Million/uL      Hemoglobin 13 0 g/dL      Hematocrit 43 8 %      MCV 84 fL      MCH 24 8 (L) pg      MCHC 29 7 (L) g/dL      RDW 17 2 (H) %      MPV 9 7 fL      Platelets 309 Thousands/uL      nRBC 0 /100 WBCs      Neutrophils Relative 86 (H) %      Lymphocytes Relative 7 (L) %      Monocytes Relative 7 %      Eosinophils Relative 0 %      Basophils Relative 0 %      Neutrophils Absolute 10 07 (H) Thousands/µL      Lymphocytes Absolute 0 76 Thousands/µL      Monocytes Absolute 0 77 Thousand/µL      Eosinophils Absolute 0 03 Thousand/µL      Basophils Absolute 0 01 Thousands/µL                  XR chest 2 views   Final Result by Austin Ulloa MD (02/01 4524)      Moderate right pleural effusion and consolidation of the right base which is probably mostly atelectasis  Pneumonia not entirely excluded        Cardiomegaly      Rightward mediastinal shift      Recommend continued follow-up         Workstation performed: [id number]               Procedures  ECG 12 Lead Documentation  Date/Time: 2/1/2018 3:22 PM  Performed by: Vivian Stern by: Wilfredo Thorpe     ECG reviewed by me, the ED Provider: yes    Patient location:  ED  Previous ECG:     Previous ECG:  Compared to current    Comparison ECG info:  October 31, 2017    Similarity:  Changes noted    Comparison to cardiac monitor: Yes    Interpretation:     Interpretation: abnormal    Rate:     ECG rate:  91    ECG rate assessment: normal    Rhythm:     Rhythm: sinus rhythm    Ectopy:     Ectopy: none QRS:     QRS axis:  Right    QRS intervals: Wide  Conduction:     Conduction: abnormal      Abnormal conduction: incomplete RBBB    ST segments:     ST segments:  Normal  T waves:     T waves: inverted      Inverted:  III  Q waves:     Q waves:  II  Comments:      EKG demonstrates normal sinus rhythm at 91 beats per minute with incomplete right bundle branch block  Axis is right  There also new T-wave changes, specifically T-wave inversion in lead III and T-wave flattening in AVF  No new Q-waves, ST segment depressions, or ST segment elevations  Overall, this is an abnormal EKG which may indicate some degree of inferior ischemia  Phone Consults  ED Phone Contact    ED Course  ED Course as of Feb 01 2333   Thu Feb 01, 2018   1332 Blood Pressure: 130/60   1333 Temperature: 98 7 °F (37 1 °C)   1333 Pulse: 94   1333 Respirations: 18   1333 SpO2: 90 %   1333 Triage vitals noted  Borderline tachycardia and borderline oxygen saturation although the patient does have a history of chronic oxygen dependence  1420 CBC demonstrates mild leukocytosis  26 CMP demonstrates slight worsening of chronic kidney disease, last creatinine was 1 23 approximately 3 months ago  1439 NT-proBNP: (!) 1,580   1439 Aspirin ordered  EKG not yet performed  Troponin I: (!) 0 12   [id number] Cardiology paged  [id number] Cardiology returned page  Spoke with Dr Georgie Christiansen  After thoroughly discussing the case, she does not not feel that heparin is necessary at this time  I agree this is likely a type 2 NSTEMI secondary to acute CHF exacerbation  Patient has been given aspirin as noted  Will admit patient to [facility]  Identification of Seniors at [address] [place] [address] Most Recent Value   (ISAR) Identification of Seniors at Risk   Before the illness or injury that brought you to the Emergency, did you need someone to help you on a regular basis?   0 Filed at: 02/01/2018 1319   In the last 24 hours, have you needed more help than usual?  1 Filed at: 02/01/2018 1319   Have you been hospitalized for one or more nights during the past 6 months? 1 Filed at: 02/01/2018 1319   In general, do you see well?  0 Filed at: 02/01/2018 1319   In general, do you have serious problems with your memory? 0 Filed at: 02/01/2018 1319   Do you take more than three different medications every day? 1 Filed at: 02/01/2018 1319   ISAR Score  3 Filed at: 02/01/2018 1319                          MDM  Number of Diagnoses or Management Options  Acute on chronic diastolic congestive heart failure ([facility] ): new and requires workup  Bilateral lower extremity edema: established and worsening  History of coronary artery disease: established and worsening  NSTEMI (non-ST elevated myocardial infarction) [facility]): new and requires workup  Stage 3 chronic kidney disease: established and worsening  Venous stasis dermatitis of both lower extremities: established and worsening  Diagnosis management comments:     81year old male presenting with increasing shortness of breath and peripheral edema  Found to have CHF exacerbation with NSTEMI  1  CHF: Patient presented with increasing shortness of breath and peripheral edema  Cardiac workup was done  BNP was added on  Labs reviewed above  Overall, history, physical examination, and results of diagnostic testing are consistent with acute CHF exacerbation  Patient had grade 1 diastolic dysfunction on echocardiogram done in September 2017  Patient was treated with IV Lasix  Patient will require admission for diuresis  2  NSTEMI: Patient was found to have elevated troponin I of 0 12 on initial labs  Patient did deny chest pain, diaphoresis, nausea, and vomiting  As noted above, patient had shortness of breath but this is more likely to due CHF exacerbation  Nevertheless, EKG did demonstrate some changes compatible with inferior ischemia  Aspirin was given  Per Cardiology, no need for heparin at this time   This is most likely a type 2 NSTEMI due to CHF but given known CAD, type I NSTEMI is possible  Patient will require serial troponin testing          Amount and/or Complexity of Data Reviewed  Clinical lab tests: ordered and reviewed  Tests in the radiology section of CPT®: ordered and reviewed  Decide to obtain previous medical records or to obtain history from someone other than the patient: yes  Obtain history from someone other than the patient: yes  Review and summarize past medical records: yes  Discuss the patient with other providers: yes  Independent visualization of images, tracings, or specimens: yes    Risk of Complications, Morbidity, and/or Mortality  Presenting problems: high  Diagnostic procedures: minimal  Management options: minimal    Patient Progress  Patient progress: improved    CritCare Time    Disposition  Final diagnoses:   Acute on chronic diastolic congestive heart failure (HCC)   Stage 3 chronic kidney disease   Bilateral lower extremity edema   NSTEMI (non-ST elevated myocardial infarction) ([facility] )   Venous stasis dermatitis of both lower extremities   History of coronary artery disease     Time reflects when diagnosis was documented in both MDM as applicable and the Disposition within this note     Time User Action Codes Description Comment    2/1/2018  3:44 PM Alease Hopes Add [I50 33] Acute on chronic diastolic congestive heart failure ([facility] )     2/1/2018  3:44 PM Alease Hopes Add [N18 3] Stage 3 chronic kidney disease     2/1/2018  3:44 PM Alease Hopes Add [R60 0] Bilateral lower extremity edema     2/1/2018  3:44 PM Alease Hopes Add [I21 4] NSTEMI (non-ST elevated myocardial infarction) ([facility] )     2/1/2018  3:44 PM Alease Hopes Add [I87 2] Venous stasis dermatitis of both lower extremities     2/1/2018  3:45 PM Alease Hopes Add [Z86 79] History of coronary artery disease     2/1/2018  4:57 PM Ting Meeks Modify [I50 33] Acute on chronic diastolic congestive heart failure ([facility] ) ED Disposition     ED Disposition Condition Comment    Admit  Case was discussed with VIKTORIA and the patient's admission status was agreed to be Admission Status: inpatient status to the service of Dr Markos Wisdom         Follow-up Information    None       Current Discharge Medication List      CONTINUE these medications which have NOT CHANGED    Details   albuterol (2 5 mg/3 mL) 0 083 % nebulizer solution Take 2 5 mg by nebulization Indications: 2-3 times a day PRN  aspirin (ECOTRIN LOW STRENGTH) 81 mg EC tablet Take 81 mg by mouth daily      budesonide-formoterol (SYMBICORT) 160-4 5 mcg/act inhaler Inhale 2 puffs 2 (two) times a day      cyclobenzaprine (FLEXERIL) 10 mg tablet Take 1 tablet by mouth daily at bedtime  Qty: 30 tablet, Refills: 0      ferrous sulfate 325 (65 Fe) mg tablet Take 325 mg by mouth daily with breakfast      furosemide (LASIX) 40 mg tablet Take 1 tablet by mouth daily  Qty: 30 tablet, Refills: 0      gabapentin (NEURONTIN) 300 mg capsule Take 300 mg by mouth daily at bedtime        !! insulin regular (HumuLIN R U-500) 500 units/mL CONCENTRATED injection Inject 0 11 mL under the skin daily before breakfast  Qty: 1 mL, Refills: 0      !! insulin regular (HumuLIN R U-500) 500 units/mL CONCENTRATED injection Inject 0 07 mL under the skin daily before dinner  Qty: 1 mL, Refills: 0      metoprolol tartrate (LOPRESSOR) 25 mg tablet Take 25 mg by mouth 2 (two) times a day        oxyCODONE-acetaminophen (PERCOCET) 7 5-325 MG per tablet Take 1 tablet by mouth 2 (two) times a day  pantoprazole (PROTONIX) 40 mg tablet Take 1 tablet by mouth 2 (two) times a day before meals  Qty: 60 tablet, Refills: 0      potassium chloride (K-DUR,KLOR-CON) 10 mEq tablet Take 1 tablet by mouth daily  Qty: 30 tablet, Refills: 0      simvastatin (ZOCOR) 40 mg tablet Take 40 mg by mouth daily at bedtime  tiotropium (SPIRIVA) 18 mcg inhalation capsule Place 18 mcg into inhaler and inhale daily       ! ! - Potential duplicate medications found  Please discuss with provider  No discharge procedures on file  ED Provider  Attending physically available and evaluated Jude Friend Sr    I managed the patient along with the ED Attending      Electronically Signed by         Andrews Perez MD  02/01/18 5796

## 2018-02-01 NOTE — ED NOTES
telebox requested from p5 - none available at this time       Corrinne Grip, BEATRICE  02/01/18 3595

## 2018-02-01 NOTE — ASSESSMENT & PLAN NOTE
· Renal function near baseline   · Consider Nephrology evaluation should creatinine worsened   · Monitor renal function on IV diuretics

## 2018-02-01 NOTE — H&P
H&P- Mahad Ellis   1945, 81 y o  male MRN: [id number]    Unit/Bed#: ED 23 Encounter: [id number]    Primary Care Provider: Joseph Martinez MD   Date and time admitted to hospital: 2/1/2018  1:22 PM        * Acute on chronic diastolic congestive heart failure ([facility] )   Assessment & Plan    · Patient with evidence acute congestive heart failure with known quadrant assault dysfunction  · IV diameter is given in ER  · Continue diuresis cardiology evaluati        Type 2 diabetes mellitus with complication ([facility] )   Assessment & Plan    · Continue outpatient medications  · Patient noted to be on U 500  · Monitor sugars with sliding scale coverage        Elevated troponin   Assessment & Plan    · Elevated troponin  · No acute changes on EKG  · Doubt acute coronary syndrome cardiology to evaluate continue med  · Defer echocardiogram to cardiology        Chronic kidney disease, stage 3   Assessment & Plan    · Renal function near baseline   · Consider Nephrology evaluation should creatinine worsened   · Monitor renal function on IV diuretics        Obesity (BMI 30-39  9)   Assessment & Plan    · Obesity noted present a mission   · Dietary maneuvers reviewed          VTE Prophylaxis: Heparin  / reason for no mechanical VTE prophylaxis not indicated   Code Status:  Full code  POLST: POLST form is not discussed and not completed at this time  Discussion with family:  Patient's wife at bedside    Anticipated Length of Stay:  Patient will be admitted on an Inpatient basis with an anticipated length of stay of  greater than 2 midnights  Justification for Hospital Stay:  Patient with elevated troponin acute does saw congestive heart failure will in all likelihood require hospitalization for greater than 48 hours    Total Time for Visit, including Counseling / Coordination of Care: 30 minutes  Greater than 50% of this total time spent on direct patient counseling and coordination of care      Chief Complaint:   I have been short of breath    History of Present Illness:    Barb Costa Sr  is a 81 y o  male who presents with shortness of breath  Patient reports increasing lower extremity edema and shortness of breath over the last several days  Denies chest pain although wife feels he might have had some chest pressure few days ago  Also noted increasing weight gain over the last few weeks to months of 8-10 lb  Denies PND orthopnea positive lower extremity edema has noted some weeping from skin over the last day or 2    Review of Systems:    Review of Systems   Constitutional: Positive for unexpected weight change  Negative for chills, diaphoresis, fatigue and fever  HENT: Negative for sinus pressure  Eyes: Negative for photophobia and visual disturbance  Respiratory: Positive for shortness of breath  Negative for choking, chest tightness, wheezing and stridor  Cardiovascular: Positive for leg swelling  Negative for chest pain and palpitations  Gastrointestinal: Negative for blood in stool, constipation, diarrhea, nausea and vomiting  Endocrine: Negative for polyphagia and polyuria  Genitourinary: Negative for dysuria, frequency, hematuria and urgency  Musculoskeletal: Negative for arthralgias and back pain  Skin: Negative for color change  Neurological: Negative for tremors, seizures and numbness  Hematological: Negative for adenopathy  Psychiatric/Behavioral: Negative for confusion         Past Medical and Surgical History:     Past Medical History:   Diagnosis Date    Abdominal pain     Cardiac disease     COPD [facility]chronic obstructive pulmonary disease) ([facility] )     Coronary artery disease     Diabetes mellitus ([facility] )     Hyperlipidemia     Hypertension     MI (myocardial infarction)     with 3 stents    Prostate cancer [facility])        Past Surgical History:   Procedure Laterality Date    ABDOMINAL SURGERY      exploratory    ANGIOPLASTY      3 stents    ESOPHAGOGASTRODUODENOSCOPY N/A 10/2/2017 Procedure: ESOPHAGOGASTRODUODENOSCOPY (EGD); Surgeon: Jacinda Meyers MD;  Location: [facility]; Service: Gastroenterology    PROSTATE SURGERY         Meds/Allergies:    Prior to Admission medications    Medication Sig Start Date End Date Taking? Authorizing Provider   albuterol (2 5 mg/3 mL) 0 083 % nebulizer solution Take 2 5 mg by nebulization Indications: 2-3 times a day PRN  Yes Historical Provider, MD   aspirin (ECOTRIN LOW STRENGTH) 81 mg EC tablet Take 81 mg by mouth daily   Yes Historical Provider, MD   budesonide-formoterol (SYMBICORT) 160-4 5 mcg/act inhaler Inhale 2 puffs 2 (two) times a day   Yes Historical Provider, MD   cyclobenzaprine (FLEXERIL) 10 mg tablet Take 1 tablet by mouth daily at bedtime 10/5/17  Yes Sylvester Plaza MD   ferrous sulfate 325 (65 Fe) mg tablet Take 325 mg by mouth daily with breakfast   Yes Historical Provider, MD   furosemide (LASIX) 40 mg tablet Take 1 tablet by mouth daily 10/5/17  Yes Sylvester Palza MD   gabapentin (NEURONTIN) 300 mg capsule Take 300 mg by mouth daily at bedtime     Yes Historical Provider, MD   insulin regular (HumuLIN R U-500) 500 units/mL CONCENTRATED injection Inject 0 11 mL under the skin daily before breakfast 10/6/17  Yes Sylvester Plaza MD   insulin regular (HumuLIN R U-500) 500 units/mL CONCENTRATED injection Inject 0 07 mL under the skin daily before dinner 10/5/17  Yes Sylvester Plaza MD   metoprolol tartrate (LOPRESSOR) 25 mg tablet Take 25 mg by mouth 2 (two) times a day     Yes Historical Provider, MD   oxyCODONE-acetaminophen (PERCOCET) 7 5-325 MG per tablet Take 1 tablet by mouth 2 (two) times a day     Yes Historical Provider, MD   pantoprazole (PROTONIX) 40 mg tablet Take 1 tablet by mouth 2 (two) times a day before meals 10/5/17  Yes Sylvester Plaza MD   potassium chloride (K-DUR,KLOR-CON) 10 mEq tablet Take 1 tablet by mouth daily 10/5/17  Yes Sylvester Plaza MD   simvastatin (ZOCOR) 40 mg tablet Take 40 mg by mouth daily at bedtime  Yes Historical Provider, MD   tiotropium (SPIRIVA) 18 mcg inhalation capsule Place 18 mcg into inhaler and inhale daily   Yes Historical Provider, MD   acetaminophen (TYLENOL) 325 mg tablet Take 3 tablets by mouth every 8 (eight) hours 7/24/17 2/1/18  Marisol Gerardo DO   fluticasone (FLONASE) 50 mcg/act nasal spray 1 spray into each nostril daily As needed for congestion 11/2/17 2/1/18  Nora Méndez MD     I have reviewed home medications with patient family member  Allergies: Allergies   Allergen Reactions    Metformin        Social History:     Marital Status: /Civil Union   Occupation:   Patient Pre-hospital Living Situation:   Patient Pre-hospital Level of Mobility:   Patient Pre-hospital Diet Restrictions:   Substance Use History:   History   Alcohol Use No     History   Smoking Status    Former Smoker    Packs/day: 1 50    Years: 50 00    Quit date: 9/13/2017   Smokeless Tobacco    Never Used     History   Drug Use No       Family History:    Family History   Problem Relation Age of Onset    Heart disease Father        Physical Exam:     Vitals:   Blood Pressure: 130/60 (02/01/18 1318)  Pulse: 94 (02/01/18 1318)  Temperature: 98 7 °F (37 1 °C) (02/01/18 1318)  Temp Source: Oral (02/01/18 1318)  Respirations: 18 (02/01/18 1318)  Height: 6' (182 9 cm) (02/01/18 1318)  Weight - Scale: 127 kg (280 lb) (02/01/18 1318)  SpO2: 90 % (02/01/18 1318)    Physical Exam   Constitutional: He is oriented to person, place, and time  No distress  Patient is morbidly obese   HENT:   Mouth/Throat: No oropharyngeal exudate  Eyes: Conjunctivae are normal  Pupils are equal, round, and reactive to light  Right eye exhibits no discharge  Left eye exhibits no discharge  No scleral icterus  Neck: No JVD present  Cardiovascular: Normal rate  Exam reveals no gallop and no friction rub  No murmur heard  Pulmonary/Chest: Effort normal  No respiratory distress  He has no wheezes  He has no rales  Abdominal: Soft  He exhibits no distension  There is no tenderness  There is no rebound and no guarding  Musculoskeletal: He exhibits edema (Positive losing from right lower extremity)  Neurological: He is alert and oriented to person, place, and time  No cranial nerve deficit  Skin: Skin is warm and dry  He is not diaphoretic  Additional Data:     Lab Results: I have personally reviewed pertinent reports  Results from last 7 days  Lab Units 02/01/18  1400   WBC Thousand/uL 11 69*   HEMOGLOBIN g/dL 13 0   HEMATOCRIT % 43 8   PLATELETS Thousands/uL 172   NEUTROS PCT % 86*   LYMPHS PCT % 7*   MONOS PCT % 7   EOS PCT % 0       Results from last 7 days  Lab Units 02/01/18  1400   SODIUM mmol/L 140   POTASSIUM mmol/L 4 7   CHLORIDE mmol/L 100   CO2 mmol/L 37*   BUN mg/dL 19   CREATININE mg/dL 1 46*   CALCIUM mg/dL 8 2*   TOTAL PROTEIN g/dL 7 8   BILIRUBIN TOTAL mg/dL 0 27   ALK PHOS U/L 72   ALT U/L 46   AST U/L 55*   GLUCOSE RANDOM mg/dL 95           Imaging: I have personally reviewed pertinent reports  XR chest 2 views   Final Result by Rae Arredondo MD (02/01 1514)      Moderate right pleural effusion and consolidation of the right base which is probably mostly atelectasis  Pneumonia not entirely excluded  Cardiomegaly      Rightward mediastinal shift      Recommend continued follow-up         Workstation performed: [id number]             EKG, Pathology, and Other Studies Reviewed on Admission:   · EKG: NSR RBBB    Allscripts / Epic Records Reviewed: Yes     ** Please Note: This note has been constructed using a voice recognition system   **

## 2018-02-01 NOTE — ASSESSMENT & PLAN NOTE
· Elevated troponin  · No acute changes on EKG  · Doubt acute coronary syndrome cardiology to evaluate continue med  · Defer echocardiogram to cardiology

## 2018-02-01 NOTE — ASSESSMENT & PLAN NOTE
· Patient with evidence acute congestive heart failure with known quadrant assault dysfunction  · IV diameter is given in ER  · Continue diuresis cardiology evaluati

## 2018-02-02 LAB
ATRIAL RATE: 91 BPM
ERYTHROCYTE [DISTWIDTH] IN BLOOD BY AUTOMATED COUNT: 17 % (ref 11.6–15.1)
GLUCOSE SERPL-MCNC: 107 MG/DL (ref 65–140)
GLUCOSE SERPL-MCNC: 114 MG/DL (ref 65–140)
GLUCOSE SERPL-MCNC: 213 MG/DL (ref 65–140)
GLUCOSE SERPL-MCNC: 316 MG/DL (ref 65–140)
HCT VFR BLD AUTO: 40.6 % (ref 36.5–49.3)
HGB BLD-MCNC: 11.6 G/DL (ref 12–17)
MCH RBC QN AUTO: 24.4 PG (ref 26.8–34.3)
MCHC RBC AUTO-ENTMCNC: 28.6 G/DL (ref 31.4–37.4)
MCV RBC AUTO: 85 FL (ref 82–98)
P AXIS: 102 DEGREES
PLATELET # BLD AUTO: 145 THOUSANDS/UL (ref 149–390)
PMV BLD AUTO: 9.5 FL (ref 8.9–12.7)
PR INTERVAL: 148 MS
QRS AXIS: 130 DEGREES
QRSD INTERVAL: 120 MS
QT INTERVAL: 384 MS
QTC INTERVAL: 472 MS
RBC # BLD AUTO: 4.76 MILLION/UL (ref 3.88–5.62)
T WAVE AXIS: 20 DEGREES
TROPONIN I SERPL-MCNC: 0.2 NG/ML
TROPONIN I SERPL-MCNC: 0.25 NG/ML
VENTRICULAR RATE: 91 BPM
WBC # BLD AUTO: 9.62 THOUSAND/UL (ref 4.31–10.16)

## 2018-02-02 PROCEDURE — 94760 N-INVAS EAR/PLS OXIMETRY 1: CPT

## 2018-02-02 PROCEDURE — 84484 ASSAY OF TROPONIN QUANT: CPT | Performed by: INTERNAL MEDICINE

## 2018-02-02 PROCEDURE — 82948 REAGENT STRIP/BLOOD GLUCOSE: CPT

## 2018-02-02 PROCEDURE — 93010 ELECTROCARDIOGRAM REPORT: CPT | Performed by: INTERNAL MEDICINE

## 2018-02-02 PROCEDURE — 99232 SBSQ HOSP IP/OBS MODERATE 35: CPT | Performed by: HOSPITALIST

## 2018-02-02 PROCEDURE — 85027 COMPLETE CBC AUTOMATED: CPT | Performed by: INTERNAL MEDICINE

## 2018-02-02 PROCEDURE — 99222 1ST HOSP IP/OBS MODERATE 55: CPT | Performed by: INTERNAL MEDICINE

## 2018-02-02 RX ADMIN — HEPARIN SODIUM 5000 UNITS: 5000 INJECTION, SOLUTION INTRAVENOUS; SUBCUTANEOUS at 13:41

## 2018-02-02 RX ADMIN — BUDESONIDE AND FORMOTEROL FUMARATE DIHYDRATE 2 PUFF: 160; 4.5 AEROSOL RESPIRATORY (INHALATION) at 17:26

## 2018-02-02 RX ADMIN — INSULIN HUMAN 55 UNITS: 500 INJECTION, SOLUTION SUBCUTANEOUS at 12:30

## 2018-02-02 RX ADMIN — FUROSEMIDE 40 MG: 10 INJECTION, SOLUTION INTRAMUSCULAR; INTRAVENOUS at 17:25

## 2018-02-02 RX ADMIN — GABAPENTIN 300 MG: 300 CAPSULE ORAL at 21:32

## 2018-02-02 RX ADMIN — METOPROLOL TARTRATE 25 MG: 25 TABLET ORAL at 08:00

## 2018-02-02 RX ADMIN — INSULIN HUMAN 55 UNITS: 500 INJECTION, SOLUTION SUBCUTANEOUS at 08:43

## 2018-02-02 RX ADMIN — Medication 325 MG: at 07:53

## 2018-02-02 RX ADMIN — INSULIN HUMAN 35 UNITS: 500 INJECTION, SOLUTION SUBCUTANEOUS at 17:25

## 2018-02-02 RX ADMIN — FUROSEMIDE 40 MG: 10 INJECTION, SOLUTION INTRAMUSCULAR; INTRAVENOUS at 08:00

## 2018-02-02 RX ADMIN — PRAVASTATIN SODIUM 40 MG: 40 TABLET ORAL at 17:25

## 2018-02-02 RX ADMIN — BUDESONIDE AND FORMOTEROL FUMARATE DIHYDRATE 2 PUFF: 160; 4.5 AEROSOL RESPIRATORY (INHALATION) at 08:05

## 2018-02-02 RX ADMIN — ASPIRIN 81 MG: 81 TABLET, COATED ORAL at 08:00

## 2018-02-02 RX ADMIN — METOPROLOL TARTRATE 25 MG: 25 TABLET ORAL at 21:33

## 2018-02-02 RX ADMIN — PANTOPRAZOLE SODIUM 40 MG: 40 TABLET, DELAYED RELEASE ORAL at 06:44

## 2018-02-02 RX ADMIN — HEPARIN SODIUM 5000 UNITS: 5000 INJECTION, SOLUTION INTRAVENOUS; SUBCUTANEOUS at 06:44

## 2018-02-02 RX ADMIN — INSULIN LISPRO 1 UNITS: 100 INJECTION, SOLUTION INTRAVENOUS; SUBCUTANEOUS at 12:28

## 2018-02-02 RX ADMIN — POTASSIUM CHLORIDE 10 MEQ: 750 TABLET, EXTENDED RELEASE ORAL at 08:00

## 2018-02-02 RX ADMIN — HEPARIN SODIUM 5000 UNITS: 5000 INJECTION, SOLUTION INTRAVENOUS; SUBCUTANEOUS at 21:32

## 2018-02-02 RX ADMIN — TIOTROPIUM BROMIDE 18 MCG: 18 CAPSULE ORAL; RESPIRATORY (INHALATION) at 08:06

## 2018-02-02 RX ADMIN — OXYCODONE HYDROCHLORIDE 10 MG: 5 SOLUTION ORAL at 21:33

## 2018-02-02 RX ADMIN — INSULIN LISPRO 3 UNITS: 100 INJECTION, SOLUTION INTRAVENOUS; SUBCUTANEOUS at 07:51

## 2018-02-02 RX ADMIN — PANTOPRAZOLE SODIUM 40 MG: 40 TABLET, DELAYED RELEASE ORAL at 17:25

## 2018-02-02 RX ADMIN — CYCLOBENZAPRINE HYDROCHLORIDE 10 MG: 10 TABLET, FILM COATED ORAL at 21:32

## 2018-02-02 NOTE — PROGRESS NOTES
Pt  Insisting that he drives himself home despite d/c instructions saying that he should have a ride home  S/w EP RACHAEL Carrera per her request Dr Johnson said it is ok for patient to drive himself home  No other orders at this time

## 2018-02-02 NOTE — CASE MANAGEMENT
Thank you,  [address] [facility] in the [facility] by Onofre Carroll for 2017  Network Utilization Review Department  Phone: [phone]; Fax [phone]  ATTENTION: The Network Utilization Review Department is now centralized for our 7 Facilities  Make a note that we have a new phone and fax numbers for our Department  Please call with any questions or concerns to [phone] and carefully follow the prompts so that you are directed to the right person  All voicemails are confidential  Fax any determinations, approvals, denials, and requests for initial or continue stay review clinical to [phone]  Due to HIGH CALL volume, it would be easier if you could please send faxed requests to expedite your requests and in part, help us provide discharge notifications faster  Initial Clinical Review    Admission: Date/Time/Statement:   2/1/18 AT 1546     Orders Placed This Encounter   Procedures    Inpatient Admission (expected length of stay for this patient is greater than two midnights)     Standing Status:   Standing     Number of Occurrences:   1     Order Specific Question:   Admitting Physician     Answer:   Yeni Crane [[id number]]     Order Specific Question:   Level of Care     Answer:   Med Surg [[id number]]     Order Specific Question:   Estimated length of stay     Answer:   More than 2 Midnights     Order Specific Question:   Certification     Answer:   I certify that inpatient services are medically necessary for this patient for a duration of greater than two midnights  See H&P and MD Progress Notes for additional information about the patient's course of treatment       ED: Date/Time/Mode of Arrival:   ED Arrival Information     Expected Arrival Acuity Means of Arrival Escorted By Service Admission Type    - 2/1/2018 13:02 Urgent Wheelchair Self General Medicine Urgent    Arrival Complaint    -          Chief Complaint:   Chief Complaint   Patient presents with  Leg Swelling     Pt wife states "I took him to his primary doctor today and he has a bad toothache and swollen legs " Sent to ED for further evaluation of "fluid because of his heart "     Chief Complaint:   I have been short of breath     History of Present Illness:   Asia Jimenez Sr  is a 81 y o  male who presents with shortness of breath  Patient reports increasing lower extremity edema and shortness of breath over the last several days  Denies chest pain although wife feels he might have had some chest pressure few days ago  Also noted increasing weight gain over the last few weeks to months of 8-10 lb  Denies PND orthopnea positive lower extremity edema has noted some weeping from skin over the last day or 2       Review of Systems:  Constitutional: Positive for unexpected weight change  Negative for chills, diaphoresis, fatigue and fever  Respiratory: Positive for shortness of breath  Negative for choking, chest tightness, wheezing and stridor  Cardiovascular: Positive for leg swelling  Negative for chest pain and palpitations  Gastrointestinal: Negative for blood in stool, constipation, diarrhea, nausea and vomiting  Genitourinary: Negative for dysuria, frequency, hematuria and urgency  Musculoskeletal: Negative for arthralgias and back pain  Skin: Negative for color change         ED Vital Signs:   ED Triage Vitals [02/01/18 1318]   Temperature Pulse Respirations Blood Pressure SpO2   98 7 °F (37 1 °C) 94 18 130/60 90 %      Temp Source Heart Rate Source Patient Position - Orthostatic VS BP Location FiO2 (%)   Oral Monitor Sitting Left arm --      Pain Score       8        Wt Readings from Last 1 Encounters:   02/02/18 131 kg (289 lb 3 9 oz)     02/01 0701  02/02 0700 02/02 0701  02/02 1051  Most Recent     Temperature (°F) 98 498 7 98 5  98 5 (36 9)    Pulse 7899 84  84    Respirations 1820 18  18    Blood Pressure 124/59189/90 127/84  127/84    SpO2 (%) 9096 93  93 LABS/Diagnostic Test Results:   Results from last 7 days  Lab Units 02/01/18  1400   WBC Thousand/uL 11 69*   HEMOGLOBIN g/dL 13 0   HEMATOCRIT % 43 8   PLATELETS Thousands/uL 172   NEUTROS PCT % 86*   LYMPHS PCT % 7*   MONOS PCT % 7   EOS PCT % 0       Results from last 7 days  Lab Units 02/01/18  1400   SODIUM mmol/L 140   POTASSIUM mmol/L 4 7   CHLORIDE mmol/L 100   CO2 mmol/L 37*   BUN mg/dL 19   CREATININE mg/dL 1 46*   CALCIUM mg/dL 8 2*   TOTAL PROTEIN g/dL 7 8   BILIRUBIN TOTAL mg/dL 0 27   ALK PHOS U/L 72   ALT U/L 46   AST U/L 55*   GLUCOSE RANDOM mg/dL 95     BNP [[id number]] (Abnormal) Collected: 02/01/18 1400   Lab Status: Final result Specimen: Blood from Arm, Right Updated: 02/01/18 1427    NT-proBNP 1,580 (H) <125 pg/mL        TROPONIN  0,12,    0 27,   0 25    EKG (per H+P):  NSR; RBBB    CHEST X RAY -  Moderate right pleural effusion and consolidation of the right base which is probably mostly atelectasis   Pneumonia not entirely excluded  Cardiomegaly  Rightward mediastinal shift      ED Treatment:   Medication Administration from 02/01/2018 1301 to 02/01/2018 1943       Date/Time Order Dose Route Action Action by Comments     02/01/2018 1500 aspirin chewable tablet 324 mg 324 mg Oral Given Walter Duarte RN      02/01/2018 1557 furosemide (LASIX) injection 40 mg 40 mg Intravenous Given Walter Duarte RN           Past Medical/Surgical History:    Active Ambulatory Problems     Diagnosis Date Noted    Coronary artery disease involving native coronary artery without angina pectoris 01/20/2016    Gastroesophageal reflux disease without esophagitis 01/20/2016    Obesity (BMI 30-39 9) 01/20/2016    Dyslipidemia 01/20/2016    H/O prostate cancer 01/20/2016    Type 2 diabetes mellitus with complication ([facility] ) 36/06/2227    Venous stasis dermatitis of both lower extremities 11/15/2016    Opioid dependence ([facility] ) 11/17/2016    Chronic respiratory failure with hypoxia ([facility] ) 07/23/2017  COPD (chronic obstructive pulmonary disease) ([place] [address] ) 07/23/2017    Bilateral lower extremity edema 07/24/2017    Chronic kidney disease, stage 3 07/24/2017     Resolved Ambulatory Problems     Diagnosis Date Noted    Chest pain 01/20/2016    COPD exacerbation ([place] [address] ) 01/20/2016    Back pain 11/12/2016    Dizziness 11/12/2016    Diabetic polyneuropathy associated with type 2 diabetes mellitus ([place] [address] ) 11/15/2016    Leukocytosis 07/17/2017    Hyponatremia 07/24/2017    Partial gastric outlet obstruction 09/28/2017    Duodenitis 09/28/2017    Hypernatremia 10/01/2017    Acute duodenal ulcer with bleeding 10/02/2017    Acute blood loss anemia 10/02/2017    SOB (shortness of breath) 10/31/2017    Pleural effusion on right 10/31/2017    Accelerated hypertension 10/31/2017     Past Medical History:   Diagnosis Date    Abdominal pain     Cardiac disease     COPD (chronic obstructive pulmonary disease) ([place] [address] )     Coronary artery disease     Diabetes mellitus ([place] [address] )     Hyperlipidemia     Hypertension     MI (myocardial infarction)     Prostate cancer ([place] [address] )        Admitting Diagnosis: CHF (congestive heart failure) ([facility]) [I50 9]  History of coronary artery disease [Z86 79]  NSTEMI (non-ST elevated myocardial infarction) ([place] [address] ) [I21 4]  Acute on chronic diastolic congestive heart failure ([facility]) [I50 33]  Bilateral lower extremity edema [R60 0]  Venous stasis dermatitis of both lower extremities [I87 2]  Stage 3 chronic kidney disease [N18 3]    Age/Sex: 81 y o  male    Assessment/Plan:   * Acute on chronic diastolic congestive heart failure ([place] [address] )   Assessment & Plan     · Patient with evidence acute congestive heart failure with known quadrant assault dysfunction  · IV diameter is given in ER  · Continue diuresis cardiology evaluati          Type 2 diabetes mellitus with complication (HCC)   Assessment & Plan     · Continue outpatient medications  · Patient noted to be on U 500  · Monitor sugars with sliding scale coverage          Elevated troponin   Assessment & Plan     · Elevated troponin  · No acute changes on EKG  · Doubt acute coronary syndrome cardiology to evaluate continue med  · Defer echocardiogram to cardiology          Chronic kidney disease, stage 3   Assessment & Plan     · Renal function near baseline   · Consider Nephrology evaluation should creatinine worsened   · Monitor renal function on IV diuretics          Obesity (BMI 30-39  9)   Assessment & Plan     · Obesity noted present a mission   · Dietary maneuvers reviewed             VTE Prophylaxis: Heparin  / reason for no mechanical VTE prophylaxis not indicated   Code Status:  Full code  POLST: POLST form is not discussed and not completed at this time  Discussion with family:  Patient's wife at bedside     Anticipated Length of Stay:  Patient will be admitted on an Inpatient basis with an anticipated length of stay of  greater than 2 midnights  ustification for Hospital Stay:  Patient with elevated troponin acute does saw congestive heart failure will in all likelihood require hospitalization for greater than 48 hours        Admission Orders:  2/1/18 AT 1546   ADMIT INPATIENT  TELEMTRY  VS Q4HRS    Up as Tolerated  Ambulate TID  SCD    NASAL O2 AT 2-3 L / Min  Continuous Pulse Oximetry    Diet Mani/CHO Controlled; Consistent Carbohydrate Diet Level 2 (5 carb servings/75 grams CHO/meal);  Sodium 2 Gm (Order [id number])       IV ACCESS    Scheduled Meds:   Current Facility-Administered Medications:  acetaminophen 650 mg Oral Q4H PRN Kellen Collins MD   albuterol 2 5 mg Nebulization Q4H PRN Kellen Collins MD   aspirin 81 mg Oral Daily Kellen Collins MD   budesonide-formoterol 2 puff Inhalation BID Kellen Collins MD   cyclobenzaprine 10 mg Oral HS Merlin Meeks MD   ferrous sulfate 325 mg Oral Daily With Breakfast Kellen Collins MD   furosemide 40 mg Intravenous BID Kellen Collins MD   gabapentin 300 mg Oral HS Kellen Collins MD heparin (porcine) 5,000 Units Subcutaneous Q8H Leandrew Gilford, MD   insulin lispro 1-5 Units Subcutaneous TID Perry Meeks MD   insulin lispro 1-5 Units Subcutaneous HS Becky Meeks MD   insulin regular 35 Units Subcutaneous Before Esther Pa MD   insulin regular 55 Units Subcutaneous BID before breakfast/lunch Deniz Romeo PA-C   metoprolol tartrate 25 mg Oral Q12H [facility] Merlin Meeks MD   oxyCODONE 5 mg Oral Q4H PRN Yunior Lerner MD   oxyCODONE 10 mg Oral Q4H PRN Yunior Lerner MD   pantoprazole 40 mg Oral BID AC Becky Meeks MD   potassium chloride 10 mEq Oral Daily Yunior Lerner MD   pravastatin 40 mg Oral Daily With Esther Pa MD   tiotropium 18 mcg Inhalation Daily Yunior Lerner MD     PRN Meds:     acetaminophen    albuterol    oxyCODONE 10 mg q4hrs prn given x 1       CONSULT CARD      CONSULT RENAL

## 2018-02-02 NOTE — PLAN OF CARE
Problem: DISCHARGE PLANNING - CARE MANAGEMENT  Goal: Discharge to post-acute care or home with appropriate resources  INTERVENTIONS:  - Conduct assessment to determine patient/family and health care team treatment goals, and need for post-acute services based on payer coverage, community resources, and patient preferences, and barriers to discharge  - Address psychosocial, clinical, and financial barriers to discharge as identified in assessment in conjunction with the patient/family and health care team  - Arrange appropriate level of post-acute services according to patient's   needs and preference and payer coverage in collaboration with the physician and health care team  - Communicate with and update the patient/family, physician, and health care team regarding progress on the discharge plan  - Arrange appropriate transportation to post-acute venues  Outcome: Progressing  - CM will continue to monitor Pt's d/c recommendations

## 2018-02-02 NOTE — PLAN OF CARE
CARDIOVASCULAR - ADULT     Maintains optimal cardiac output and hemodynamic stability Progressing     Absence of cardiac dysrhythmias or at baseline rhythm Progressing        DISCHARGE PLANNING     Discharge to home or other facility with appropriate resources Progressing        Knowledge Deficit     Patient/family/caregiver demonstrates understanding of disease process, treatment plan, medications, and discharge instructions Progressing        METABOLIC, FLUID AND ELECTROLYTES - ADULT     Electrolytes maintained within normal limits Progressing     Fluid balance maintained Progressing     Glucose maintained within target range Progressing        PAIN - ADULT     Verbalizes/displays adequate comfort level or baseline comfort level Progressing        Potential for Falls     Patient will remain free of falls Progressing        RESPIRATORY - ADULT     Achieves optimal ventilation and oxygenation Progressing        SKIN/TISSUE INTEGRITY - ADULT     Skin integrity remains intact Progressing     Incision(s), wounds(s) or drain site(s) healing without S/S of infection Progressing     Oral mucous membranes remain intact Progressing

## 2018-02-02 NOTE — SOCIAL WORK
CM met with Pt with an introduction and explanation of roles  Pt reported residing with spouse and adult son in a 2 story home with o2 with PRN use, a cane, roller walker, scooter and 1 step to enter the home  Pt reported being independent with ADLs, had [facility] in the past but denies any hx of SNF, mental health or drug/alcohol placements  Pt uses [email or web address] on [address]     CM reviewed d/c planning process including the following: identifying help at home, patient preference for d/c planning needs, Discharge Lounge, [facility] Meds to Bed program, availability of treatment team to discuss questions or concerns patient and/or family may have regarding understanding medications and recognizing signs and symptoms once discharged  CM also encouraged patient to follow up with all recommended appointments after discharge  Patient advised of importance for patient and family to participate in managing patients medical well being

## 2018-02-02 NOTE — ASSESSMENT & PLAN NOTE
· Diabetes mellitus type 2  · Continue blood glucose monitor  · Continue meds  · Loose blood glucose control during hospitalization  · Insulin sliding scale

## 2018-02-02 NOTE — ASSESSMENT & PLAN NOTE
57-year-old man with shortness of breath present on admission, increasing lower extremity edema  Patient admitted to being noncompliant with Lasix over the past few days and increased weight gain of 8-10 lb over the past few weeks  Clinically appears volume overloaded with bilateral lower extremity edema, diminished breath sounds bilaterally, with fine crackles at the bases of both lung fields  Chest x-ray on admission Moderate right pleural effusion and consolidation of the right base which is probably mostly atelectasis   Pneumonia not entirely excluded  Cardiomegaly  Rightward mediastinal shift  Recommend continued follow-up      Weight 127-131 (increasing?)  Likely chronic diastolic heart failure exacerbation  ACS excluded  Troponin stable      Plan  Continue diuresis with Lasix  Monitor electrolytes with BMP  Follow-up cardiology  Monitor input output  Keep negative balance

## 2018-02-02 NOTE — MEDICAL STUDENT
MEDICAL STUDENT  Inpatient Progress Note for TRAINING ONLY  Not Part of Legal Medical Record       Progress Note - Ric Crawford Sr  81 y o  male MRN: [id number]    Unit/Bed#: [facility] 530-01 Encounter: [id number]      Assessment:  80 y/o male PMH significant for CAD with prior MI and 3 stents; COPD on 3L at home; CHF with grade 1 diastolic dysfunction as of 9/2017, T2DM on insulin (most recent A1C 6 8 ); HTN, and hyperlipidemia who presented in an acute exacerbation of his heart failure  Plan:  Acute Exacerbation of CHF Grade 1 Diastolic Dysfunction  · BNP 1,580  Troponins ~0 2 --> likely Type 2 NSTEMI, with EKG not suggestive of active ischemia, but evidence for RVH and RBBB  · CXR demonstrated some vascular congestion with R-sided effusions  · Most recent ECHO was 9/2017 with EF of 65%  Will likely need a repeat ECHO during this admission  · Currently receiving 40mg BID IV LASIX  Creatinine @ 1 43  Will continue diuresis and monitor creatinine  Baseline appears to be ~1 2  Patient may benefit from Bumex in light of his kidney function- will await cardiology recommendations   · We will stop trending Troponin  · Strict I/Os  Fluid restriction  · Cardiology consult in place  Appreciate input    Acute Kidney Injury on CKD3 - Cardiorenal Syndrome  · Patient's Creatinine currently @ 1 43  Baseline appears to be between 1 1 and 1 2    · His current elevation is likely 2/2 to his volume overload decreasing adequate perfusion to his kidneys  We will maintain gentle diuresis at 40mg BID and monitor     Type 2 Diabetes Mellitus  · Most recent A1C was 6 8  · Patient on insulin at home, we will continue with glucose checks prior to meals and insulin regiment   · Wounds on patient's lower extremities is concerning given his history as a diabetic  Upon inspection, the majority of them are clean based except for the lesion on the posterior aspect of his R ankle   There is no active drainage at this time, aside from the wound weeping that is occurring at all wounds  · The redness surrounding both of his feet is likely vascular rubor 2/2 to venous stasis dermatitis and not cellulitic changes  · In light of the lack of a leukocytosis we will refrain from antibiotics and cultures at this time  · We will consult wound care to evaluate these lesions  COPD w/o Acute Exacerbation  · Patient's SOB likely 2/2 more to his cardiac condition as opposed to a COPD exacerbation  Denies any new cough or worsening sputum production  · Will continue respiratory treatment and nebulizers as per home regiment  · On 3L NC at home for dyspnea with minimal exertion  Disposition  · Patient will remain inpatient until adequate diuresis is achieved, and he has been evaluated by cardiology  · Will recheck BMP and CBC tomorrow morning to monitor creatinine, electrolytes, and ensure that his initial leukocytosis in the ED has resolved     Subjective:   I'm doing ok     HPI: Villa Gold is a pleasant 81year old male who [facility] [place] with shortness of breath and lower extremity swelling  He states that both of these symptoms started about 5 days ago, and have since worsened  He admits to not taking his diuretic medication for the past two days because he had to help his wife with errands, and could not spend additional time trying to find a bathroom  His leg swelling has progressed to the point that the wounds he has on his legs from venous stasis dermatitis have started weeping  He also has one wound on the posterior aspect of his ankle which he reports to be draining purulent green liquid  In the ED he was found to have a BNP of 1,580 and a mild elevation of his troponin (~0 2)  EKG demonstrated evidence of a RBBB with RV hypertrophy and slurred S waves in septal leads  CXR demonstrated some vascular congestion with moderate right pleural effusion and consolidation of the right base most likely atelectasis   In the ED he was started on IV diuresis with 40mg LASIX and was recommended to be admitted  At no point did the patient have chest pain  Patient is a former smoker  He states he quit smoking 6 months ago after smoking 2 packs per day for 50 years  He follows with Dr Priscila Lugo and is on home oxygen (~3L)  He is compliant with his inhaler and nebulizer medications  Objective:     Vitals: Blood pressure 127/84, pulse 84, temperature 98 5 °F (36 9 °C), temperature source Oral, resp  rate 18, height 6' (1 829 m), weight 131 kg (289 lb 3 9 oz), SpO2 93 %  ,Body mass index is 39 23 kg/m²  Intake/Output Summary (Last 24 hours) at 02/02/18 0955  Last data filed at 02/02/18 0831   Gross per 24 hour   Intake              671 ml   Output             1425 ml   Net             -754 ml       Physical Exam: /84 (BP Location: Right arm)   Pulse 84   Temp 98 5 °F (36 9 °C) (Oral)   Resp 18   Ht 6' (1 829 m)   Wt 131 kg (289 lb 3 9 oz)   SpO2 93%   BMI 39 23 kg/m²   General appearance: alert and oriented, in no acute distress and fatigued  Neck: no adenopathy, no carotid bruit, supple, symmetrical, trachea midline and mild JVD  Lungs: clear to auscultation bilaterally and minimal breath sounds in all lung fields  Heart: regular rate and rhythm, S1, S2 normal, no murmur, click, rub or gallop  Abdomen: soft, non-tender; bowel sounds normal; no masses,  no organomegaly and patient is morbidly obese   Extremities: B/L venous stasis dermatitis  Several lesions on both legs that are actively weeping given his volume overloaded status  one lesion on the posterior aspect of his right ankle that has more of a white crust around it  Both legs are profoundly erythematous, but not cellulitic  The swelling is not pitting  Invasive Devices     Peripheral Intravenous Line            Peripheral IV 02/01/18 Left Forearm less than 1 day                Lab, Imaging and other studies: I have personally reviewed pertinent reports      VTE Pharmacologic Prophylaxis: Heparin  VTE Mechanical Prophylaxis: sequential compression device

## 2018-02-02 NOTE — CONSULTS
Consultation - Cardiology   Wan Daniels  81 y o  male MRN: [id number]  Unit/Bed#: [facility] 530-01 Encounter: [id number]    Assessment/Plan     1  Acute on Chronic Diastolic Heart Failure likely secondary to medication noncompliance  Patient reports he was noncompliant with his home diuretics last week as his wife was sick  Patient reports a ten pound weight gain in one month   At home on Furosemide 40 mg daily  Continue IV lasix 40 mg twice daily   Net negative 1 2 L this admission  Continue to monitor strict Ins and Outs   Monitor daily weights   Last Echocardiogram 9/29/2017 revealed preserved LVEF at 65% and a grade 1 diastolic dysfunction  Elevated pro-BNP at 1580  Mild elevation in troponin peaked at 0 27, likely NSTEMI type 2  EKG when compared to previous revealed no acute changes   Continue telemetry monitoring  2  Hypertension   Home regimen metoprolol tartrate 25 mg twice daily   Continue home regimen  3  Coronary Artery Disease status post PCI 6 years ago  Last coronary angiography 2015  Continue Asprin, beta blocker, and statin therapy     4  COPD  Patient smoked 2 PPD x 50 years ago, quit 6 months ago   On home oxygen chronically, 2 5-3 L   Uses Symbicort, Spiriva, and albuterol nebulizer treatments  Follows with Dr Luly Ramirez as outpatient      5  Acute on Chronic Kidney Disease stage III   Baseline Creatine 1 1-1 2, elevated to 1 46 on admission, currently 1 43      6  History of Gastric and Duodenal Bleeding ulcers   Hemoglobin stable     7  Type 2 Diabetes Mellitus   On Humalin at home 55 units with breakfast and lunch and 35 units at bedtime       8  Morbid Obesity   Encourage lifestyle modifications     History of Present Illness   Physician Requesting Consult: Constantino Magana MD  Reason for Consult / Principal Problem: Acute on Chronic Diastolic Heart Failure   HPI: Wan Daniels  is a 81y o  year old male who presents with a past medical history significant for CAD status post PCI with stents in RCA and Diagonal 1, COPD, Chronic Hypoxemic Respiratory failure [on home oxygen 2 5-3 L], morbid obesity, Type 2 Diabetes Mellitus, Hypertension, and Hyperlipidemia who presents for increased bilateral leg swelling and shortness of breath  Patient admits he was noncompliant with his diuretics over the past week because his wife was ill and he did not have regular access to the bathroom  He reports shortness of breath, dyspnea on exertion, worsening leg swelling, nonproductive cough, 10 pound unintentional weight gain  He denies chest pain, nausea, vomiting, diarrhea, dysuria, or ambulatory dysfunction  He denies any apneic episodes and reports his sleep study was negative for JACQUELINE  He uses home oxygen daily at about 2 5-3 L  Inpatient consult to Cardiology     Performed by  Mihai Landaverde by Tiffany ZHANG              Review of Systems   Constitutional: Positive for activity change (Chronic), fatigue and unexpected weight change (10 pound weight gain)  Negative for appetite change, chills and fever  HENT: Negative for sneezing and sore throat  Eyes: Negative for visual disturbance  Respiratory: Positive for cough and shortness of breath  Negative for apnea, choking and wheezing  Cardiovascular: Positive for leg swelling  Negative for chest pain and palpitations  Gastrointestinal: Negative for abdominal pain, constipation, diarrhea and vomiting  Genitourinary: Negative for difficulty urinating and dysuria  Musculoskeletal: Negative for arthralgias  Skin: Negative for color change  Oozing wounds     Neurological: Negative for weakness and headaches  Hematological: Negative for adenopathy  Psychiatric/Behavioral: Negative for agitation and behavioral problems         Historical Information   Past Medical History:   Diagnosis Date    Abdominal pain     Cardiac disease     [facility] [address] [facility]     Coronary artery disease     Diabetes mellitus ([facility] [address] [facility]     Hyperlipidemia     Hypertension     MI (myocardial infarction)     with 3 stents    Prostate cancer [facility])      Past Surgical History:   Procedure Laterality Date    ABDOMINAL SURGERY      exploratory    ANGIOPLASTY      3 stents    ESOPHAGOGASTRODUODENOSCOPY N/A 10/2/2017    Procedure: ESOPHAGOGASTRODUODENOSCOPY (EGD); Surgeon: Usman Whitt MD;  Location: [facility];   Service: Gastroenterology    PROSTATE SURGERY       History   Alcohol Use No     History   Drug Use No     History   Smoking Status    Former Smoker    Packs/day: 1 50    Years: 50 00    Quit date: 9/13/2017   Smokeless Tobacco    Never Used     Family History:   Family History   Problem Relation Age of Onset    Heart disease Father        Meds/Allergies   all current active meds have been reviewed, current meds:   Current Facility-Administered Medications   Medication Dose Route Frequency    acetaminophen (TYLENOL) tablet 650 mg  650 mg Oral Q4H PRN    albuterol inhalation solution 2 5 mg  2 5 mg Nebulization Q4H PRN    aspirin (ECOTRIN LOW STRENGTH) EC tablet 81 mg  81 mg Oral Daily    budesonide-formoterol (SYMBICORT) 160-4 5 mcg/act inhaler 2 puff  2 puff Inhalation BID    cyclobenzaprine (FLEXERIL) tablet 10 mg  10 mg Oral HS    ferrous sulfate tablet 325 mg  325 mg Oral Daily With Breakfast    furosemide (LASIX) injection 40 mg  40 mg Intravenous BID    gabapentin (NEURONTIN) capsule 300 mg  300 mg Oral HS    heparin (porcine) subcutaneous injection 5,000 Units  5,000 Units Subcutaneous Q8H [address]    insulin lispro (HumaLOG) 100 units/mL subcutaneous injection 1-5 Units  1-5 Units Subcutaneous TID AC    insulin lispro (HumaLOG) 100 units/mL subcutaneous injection 1-5 Units  1-5 Units Subcutaneous HS    insulin regular (HumuLIN R U-500) 500 units/mL CONCENTRATED injection 35 Units  35 Units Subcutaneous Before Dinner    insulin regular (HumuLIN R U-500) 500 units/mL CONCENTRATED injection 55 Units  55 Units Subcutaneous BID before breakfast/lunch    metoprolol tartrate (LOPRESSOR) tablet 25 mg  25 mg Oral Q12H [address]    oxyCODONE (ROXICODONE) IR tablet 5 mg  5 mg Oral Q4H PRN    oxyCODONE (ROXICODONE) oral solution 10 mg  10 mg Oral Q4H PRN    pantoprazole (PROTONIX) EC tablet 40 mg  40 mg Oral BID AC    potassium chloride (K-DUR,KLOR-CON) CR tablet 10 mEq  10 mEq Oral Daily    pravastatin (PRAVACHOL) tablet 40 mg  40 mg Oral Daily With Dinner    tiotropium (SPIRIVA) capsule for inhaler 18 mcg  18 mcg Inhalation Daily    and PTA meds:   Prior to Admission Medications   Prescriptions Last Dose Informant Patient Reported? Taking? albuterol (2 5 mg/3 mL) 0 083 % nebulizer solution 2/1/2018 at Unknown time  Yes Yes   Sig: Take 2 5 mg by nebulization Indications: 2-3 times a day PRN     aspirin (ECOTRIN LOW STRENGTH) 81 mg EC tablet 2/1/2018 at Unknown time  Yes Yes   Sig: Take 81 mg by mouth daily   budesonide-formoterol (SYMBICORT) 160-4 5 mcg/act inhaler 2/1/2018 at Unknown time  Yes Yes   Sig: Inhale 2 puffs 2 (two) times a day   cyclobenzaprine (FLEXERIL) 10 mg tablet 2/1/2018 at Unknown time  No Yes   Sig: Take 1 tablet by mouth daily at bedtime   ferrous sulfate 325 (65 Fe) mg tablet 2/1/2018 at Unknown time  Yes Yes   Sig: Take 325 mg by mouth daily with breakfast   furosemide (LASIX) 40 mg tablet 1/31/2018 at Unknown time  No Yes   Sig: Take 1 tablet by mouth daily   gabapentin (NEURONTIN) 300 mg capsule 2/1/2018 at Unknown time  Yes Yes   Sig: Take 300 mg by mouth daily at bedtime     insulin regular (HumuLIN R U-500) 500 units/mL CONCENTRATED injection 2/1/2018 at Unknown time  No Yes   Sig: Inject 0 11 mL under the skin daily before breakfast   insulin regular (HumuLIN R U-500) 500 units/mL CONCENTRATED injection 2/1/2018 at Unknown time  No Yes   Sig: Inject 0 07 mL under the skin daily before dinner   metoprolol tartrate (LOPRESSOR) 25 mg tablet 2/1/2018 at Unknown time  Yes Yes   Sig: Take 25 mg by mouth 2 (two) times a day     oxyCODONE-acetaminophen (PERCOCET) 7 5-325 MG per tablet 2/1/2018 at Unknown time  Yes Yes   Sig: Take 1 tablet by mouth 2 (two) times a day  pantoprazole (PROTONIX) 40 mg tablet 2/1/2018 at Unknown time  No Yes   Sig: Take 1 tablet by mouth 2 (two) times a day before meals   potassium chloride (K-DUR,KLOR-CON) 10 mEq tablet 2/1/2018 at Unknown time  No Yes   Sig: Take 1 tablet by mouth daily   simvastatin (ZOCOR) 40 mg tablet 2/1/2018 at Unknown time  Yes Yes   Sig: Take 40 mg by mouth daily at bedtime  tiotropium (SPIRIVA) 18 mcg inhalation capsule 2/1/2018 at Unknown time  Yes Yes   Sig: Place 18 mcg into inhaler and inhale daily      Facility-Administered Medications: None     Allergies   Allergen Reactions    Metformin        Objective   Vitals: Blood pressure 127/84, pulse 84, temperature 98 5 °F (36 9 °C), temperature source Oral, resp  rate 18, height 6' (1 829 m), weight 131 kg (289 lb 3 9 oz), SpO2 93 %  Orthostatic Blood Pressures    Flowsheet Row Most Recent Value   Blood Pressure  127/84 filed at 02/02/2018 0654   Patient Position - Orthostatic VS  Lying filed at 02/02/2018 1354            Intake/Output Summary (Last 24 hours) at 02/02/18 0858  Last data filed at 02/02/18 0831   Gross per 24 hour   Intake              671 ml   Output             1425 ml   Net             -754 ml       Invasive Devices     Peripheral Intravenous Line            Peripheral IV 02/01/18 Left Forearm less than 1 day                Physical Exam   Constitutional: He is oriented to person, place, and time  He appears well-developed and well-nourished  No distress  Morbidly obese     HENT:   Head: Normocephalic and atraumatic  Mouth/Throat: No oropharyngeal exudate  Poor dentition    Eyes: Conjunctivae and EOM are normal  Pupils are equal, round, and reactive to light  No scleral icterus  Neck: Normal range of motion  No JVD present  Cardiovascular: Normal rate, regular rhythm, normal heart sounds and intact distal pulses  Exam reveals no gallop and no friction rub  No murmur heard  Pulmonary/Chest: Effort normal  No respiratory distress  He has decreased breath sounds in the right lower field and the left lower field  He has no wheezes  He has no rales  He exhibits no tenderness  Abdominal: Soft  Bowel sounds are normal  He exhibits no distension (obese abdomen) and no mass  There is no tenderness  There is no rebound and no guarding  Musculoskeletal: He exhibits edema (Bilateral LE 2+ pitting edema)  He exhibits no tenderness  Lymphadenopathy:     He has no cervical adenopathy  Neurological: He is alert and oriented to person, place, and time  No cranial nerve deficit  Resting tremor at baseline   Skin: Skin is warm and dry  No rash noted  He is not diaphoretic  No erythema  No pallor  Chronic venous stasis changes with some oozing wounds     Psychiatric: He has a normal mood and affect  His behavior is normal        Lab Results:   I have personally reviewed pertinent lab results      CBC with diff:   Results from last 7 days  Lab Units 02/02/18  0239   WBC Thousand/uL 9 62   RBC Million/uL 4 76   HEMOGLOBIN g/dL 11 6*   HEMATOCRIT % 40 6   MCV fL 85   MCH pg 24 4*   MCHC g/dL 28 6*   RDW % 17 0*   MPV fL 9 5   PLATELETS Thousands/uL 145*     CMP:   Results from last 7 days  Lab Units 02/01/18  2043 02/01/18  1400   SODIUM mmol/L 137 140   POTASSIUM mmol/L 4 2 4 7   CHLORIDE mmol/L 96* 100   CO2 mmol/L 41* 37*   ANION GAP mmol/L 0* 3*   BUN mg/dL 18 19   CREATININE mg/dL 1 43* 1 46*   GLUCOSE RANDOM mg/dL 123 95   CALCIUM mg/dL 8 3 8 2*   AST U/L  --  55*   ALT U/L  --  46   ALK PHOS U/L  --  72   TOTAL PROTEIN g/dL  --  7 8   BILIRUBIN TOTAL mg/dL  --  0 27   EGFR ml/min/1 73sq m 49 48     Troponin:   0  Lab Value Date/Time   TROPONINI 0 20 (H) 02/02/2018 0239   TROPONINI 0 25 (H) 02/01/2018 2332   TROPONINI 0 27 (H) 02/01/2018 2043   TROPONINI 0 12 (H) 02/01/2018 1400   TROPONINI <0 02 11/01/2017 0440   TROPONINI <0 02 11/01/2017 0108   TROPONINI 0 10 (H) 09/28/2017 2005   TROPONINI 0 10 (H) 09/28/2017 1730   TROPONINI <0 02 11/13/2016 0014   TROPONINI <0 02 11/12/2016 2018   TROPONINI <0 02 01/21/2016 0543   TROPONINI <0 02 01/21/2016 0129   TROPONINI <0 02 01/20/2016 1217   TROPONINI <0 02 01/20/2016 0347   TROPONINI <0 02 01/20/2016 0141   TROPONINI <0 02 09/09/2015 0530   TROPONINI <0 02 09/09/2015 0041   TROPONINI 0 04 03/23/2015 1443   TROPONINI <0 04 03/23/2015 0629   TROPONINI <0 04 03/20/2014 0614   TROPONINI <0 04 03/20/2014 0149     BNP:   Results from last 7 days  Lab Units 02/01/18  2043   SODIUM mmol/L 137   POTASSIUM mmol/L 4 2   CHLORIDE mmol/L 96*   CO2 mmol/L 41*   ANION GAP mmol/L 0*   BUN mg/dL 18   CREATININE mg/dL 1 43*   GLUCOSE RANDOM mg/dL 123   CALCIUM mg/dL 8 3   EGFR ml/min/1 73sq m 49     Coags:     TSH:     Magnesium:   Results from last 7 days  Lab Units 02/01/18  2043   MAGNESIUM mg/dL 2 3     Lipid Profile:     Imaging: I have personally reviewed pertinent reports  EKG: Ventricular rate 91, normal sinus rhythm with RBBB  VTE Prophylaxis: Heparin    Code Status: Level 1 - Full Code    Counseling / Coordination of Care  Total floor / unit time spent today 30 minutes  Greater than 50% of total time was spent with the patient and / or family counseling and / or coordination of care

## 2018-02-02 NOTE — ASSESSMENT & PLAN NOTE
Chronic venous stasis with bilateral lower extremity skin changes, 3+ pitting edema    Plan  Wound care  Compression bandages, compression stockings  Elevate legs  No antibiotics needed at this time since there is no evidence of cellulitis

## 2018-02-02 NOTE — ASSESSMENT & PLAN NOTE
· Wilder / CKD  · BUN creatinine 18/1 43  · GFR of 49  · Continue IV diuretics, to alleviate bilateral lower extremity edema, pulmonary edema  · Follow-up BMP tomorrow

## 2018-02-02 NOTE — PROGRESS NOTES
Progress Note - Markel Jones   1945, 81 y o  male MRN: [id number]    Unit/Bed#: [facility] 530-01 Encounter: [id number]    Primary Care Provider: Froilan Menjivar MD   Date and time admitted to hospital: 2/1/2018  1:22 PM        * Acute on chronic diastolic congestive heart failure ([address] )   Assessment & Plan    19-year-old man with shortness of breath present on admission, increasing lower extremity edema  Patient admitted to being noncompliant with Lasix over the past few days and increased weight gain of 8-10 lb over the past few weeks  Clinically appears volume overloaded with bilateral lower extremity edema, diminished breath sounds bilaterally, with fine crackles at the bases of both lung fields  Chest x-ray on admission Moderate right pleural effusion and consolidation of the right base which is probably mostly atelectasis   Pneumonia not entirely excluded  Cardiomegaly  Rightward mediastinal shift  Recommend continued follow-up      Weight 127-131 (increasing?)  Likely chronic diastolic heart failure exacerbation  ACS excluded  Troponin stable      Plan  Continue diuresis with Lasix  Monitor electrolytes with BMP  Follow-up cardiology  Monitor input output  Keep negative balance          Elevated troponin   Assessment & Plan    · Stable  · ACS unlikely        Chronic kidney disease, stage 3   Assessment & Plan    · Wilder / CKD  · BUN creatinine 18/1 43  · GFR of 49  · Continue IV diuretics, to alleviate bilateral lower extremity edema, pulmonary edema  · Follow-up BMP tomorrow        Bilateral lower extremity edema   Assessment & Plan    Chronic venous stasis with bilateral lower extremity skin changes, 3+ pitting edema    Plan  Wound care  Compression bandages, compression stockings  Elevate legs  No antibiotics needed at this time since there is no evidence of cellulitis        COPD (chronic obstructive pulmonary disease) ([facility])   Assessment & Plan    Stable  Continue medical management above  Continue meds for COPD        Type 2 diabetes mellitus with complication ([facility] [address] )   Assessment & Plan    · Diabetes mellitus type 2  · Continue blood glucose monitor  · Continue meds  · Loose blood glucose control during hospitalization  · Insulin sliding scale        Obesity (BMI 30-39  9)   Assessment & Plan    · Morbid obesity  · Lifestyle modification  · Counseling provided on diet exercise and weight loss            VTE Pharmacologic Prophylaxis:   Pharmacologic: Heparin  Mechanical VTE Prophylaxis in Place: No, bilateral lower extremity edema with weeping fluid    Patient Centered Rounds: I have performed bedside rounds with nursing staff today  Discussions with Specialists or Other Care Team Provider:  Yes    Education and Discussions with Family / Patient:  Yes    Time Spent for Care: 45 minutes  More than 50% of total time spent on counseling and coordination of care as described above  Current Length of Stay: 1 day(s)    Current Patient Status: Inpatient   Certification Statement: The patient will continue to require additional inpatient hospital stay due to Medical management of Congestive heart failure exacerbation    Discharge Plan:  Home when medically stable    Code Status: Level 1 - Full Code      Subjective:   Reported significant pain in the lower extremities bilaterally with swelling  Improved shortness of breath  Review of systems negative for chest pain, fever, palpitation  Objective:     Vitals:   Temp (24hrs), Av 5 °F (36 9 °C), Min:98 °F (36 7 °C), Max:98 8 °F (37 1 °C)    HR:  [75-99] 80  Resp:  [18-20] 20  BP: (120-189)/(59-90) 151/67  SpO2:  [91 %-96 %] 93 %  Body mass index is 39 23 kg/m²  Input and Output Summary (last 24 hours):        Intake/Output Summary (Last 24 hours) at 18 1632  Last data filed at 18 1510   Gross per 24 hour   Intake             1151 ml   Output             1925 ml   Net             -774 ml       Physical Exam:     Physical Exam   Constitutional: He is oriented to person, place, and time  He appears well-developed and well-nourished  No distress  HENT:   Head: Normocephalic  Mouth/Throat: Oropharynx is clear and moist    Eyes: Conjunctivae and EOM are normal  Pupils are equal, round, and reactive to light  Neck: Normal range of motion  Neck supple  No JVD present  Cardiovascular: Normal rate  No murmur heard  Pulmonary/Chest: Effort normal and breath sounds normal  No respiratory distress  He has no wheezes  Fine crackles towards lung bases   Diminished breath sounds bilaterally   Abdominal: Soft  Bowel sounds are normal  He exhibits no distension  There is no tenderness  Hepatomegaly present   Musculoskeletal: Normal range of motion  He exhibits edema  He exhibits no tenderness  3+ pitting edema bilateral lower extremity   Lymphadenopathy:     He has no cervical adenopathy  Neurological: He is alert and oriented to person, place, and time  Skin: Skin is warm  No erythema  Bilateral lower extremity edema with skin weeping fluid   Psychiatric: He has a normal mood and affect  His behavior is normal  Judgment and thought content normal        Additional Data:     Labs:      Results from last 7 days  Lab Units 02/02/18  0239  02/01/18  1400   WBC Thousand/uL 9 62  --  11 69*   HEMOGLOBIN g/dL 11 6*  --  13 0   HEMATOCRIT % 40 6  --  43 8   PLATELETS Thousands/uL 145*  < > 172   NEUTROS PCT %  --   --  86*   LYMPHS PCT %  --   --  7*   MONOS PCT %  --   --  7   EOS PCT %  --   --  0   < > = values in this interval not displayed      Results from last 7 days  Lab Units 02/01/18  2043 02/01/18  1400   SODIUM mmol/L 137 140   POTASSIUM mmol/L 4 2 4 7   CHLORIDE mmol/L 96* 100   CO2 mmol/L 41* 37*   BUN mg/dL 18 19   CREATININE mg/dL 1 43* 1 46*   CALCIUM mg/dL 8 3 8 2*   TOTAL PROTEIN g/dL  --  7 8   BILIRUBIN TOTAL mg/dL  --  0 27   ALK PHOS U/L  --  72   ALT U/L  --  46   AST U/L  --  55*   GLUCOSE RANDOM mg/dL 123 95           * I Have Reviewed All Lab Data Listed Above  * Additional Pertinent Lab Tests Reviewed: María 66 Admission Reviewed    Imaging:reviewed    Recent Cultures (last 7 days):           Last 24 Hours Medication List:     Current Facility-Administered Medications:  acetaminophen 650 mg Oral Q4H PRN Ksenia Singh MD   albuterol 2 5 mg Nebulization Q4H PRN Ksenia Singh MD   aspirin 81 mg Oral Daily Ksenia Singh MD   budesonide-formoterol 2 puff Inhalation BID Ksenia Singh MD   cyclobenzaprine 10 mg Oral HS Lizandro Meeks MD   ferrous sulfate 325 mg Oral Daily With Jayy Morin MD   furosemide 40 mg Intravenous BID Ksenia Singh MD   gabapentin 300 mg Oral HS Ksenia Singh MD   heparin (porcine) 5,000 Units Subcutaneous Q8H [address] Ksenia Singh MD   insulin lispro 1-5 Units Subcutaneous TID [place] Liberty Meeks MD   insulin lispro 1-5 Units Subcutaneous HS Ksenia Singh MD   insulin regular 35 Units Subcutaneous Before Raul Olivas MD   insulin regular 55 Units Subcutaneous BID before breakfast/lunch Deniz Romeo PA-C   metoprolol tartrate 25 mg Oral Q12H [address] Merlin Meeks MD   oxyCODONE 5 mg Oral Q4H PRN Ksenia Singh MD   oxyCODONE 10 mg Oral Q4H PRN Ksenia Singh MD   pantoprazole 40 mg Oral BID AC Lizandro Meeks MD   potassium chloride 10 mEq Oral Daily Ksenia Singh MD   pravastatin 40 mg Oral Daily With Raul Olivas MD   tiotropium 18 mcg Inhalation Daily Ksenia Singh MD        Today, Patient Was Seen By: Sonya Lehman MD    ** Please Note: Dictation voice to text software may have been used in the creation of this document   **

## 2018-02-03 PROBLEM — K08.9 POOR DENTITION: Status: ACTIVE | Noted: 2018-02-03

## 2018-02-03 PROBLEM — I21.A1 NON-ST ELEVATION MYOCARDIAL INFARCTION (NSTEMI), TYPE 2: Status: ACTIVE | Noted: 2018-02-01

## 2018-02-03 LAB
ALBUMIN SERPL BCP-MCNC: 2.6 G/DL (ref 3.5–5)
ALP SERPL-CCNC: 63 U/L (ref 46–116)
ALT SERPL W P-5'-P-CCNC: 35 U/L (ref 12–78)
ANION GAP SERPL CALCULATED.3IONS-SCNC: 1 MMOL/L (ref 4–13)
ANISOCYTOSIS BLD QL SMEAR: PRESENT
AST SERPL W P-5'-P-CCNC: 69 U/L (ref 5–45)
BASOPHILS # BLD MANUAL: 0 THOUSAND/UL (ref 0–0.1)
BASOPHILS NFR MAR MANUAL: 0 % (ref 0–1)
BILIRUB SERPL-MCNC: 0.49 MG/DL (ref 0.2–1)
BUN SERPL-MCNC: 19 MG/DL (ref 5–25)
CALCIUM SERPL-MCNC: 8.1 MG/DL (ref 8.3–10.1)
CHLORIDE SERPL-SCNC: 96 MMOL/L (ref 100–108)
CO2 SERPL-SCNC: 43 MMOL/L (ref 21–32)
CREAT SERPL-MCNC: 1.27 MG/DL (ref 0.6–1.3)
EOSINOPHIL # BLD MANUAL: 0.09 THOUSAND/UL (ref 0–0.4)
EOSINOPHIL NFR BLD MANUAL: 1 % (ref 0–6)
ERYTHROCYTE [DISTWIDTH] IN BLOOD BY AUTOMATED COUNT: 16.7 % (ref 11.6–15.1)
GFR SERPL CREATININE-BSD FRML MDRD: 57 ML/MIN/1.73SQ M
GLUCOSE SERPL-MCNC: 158 MG/DL (ref 65–140)
GLUCOSE SERPL-MCNC: 251 MG/DL (ref 65–140)
GLUCOSE SERPL-MCNC: 264 MG/DL (ref 65–140)
GLUCOSE SERPL-MCNC: 301 MG/DL (ref 65–140)
GLUCOSE SERPL-MCNC: 310 MG/DL (ref 65–140)
GLUCOSE SERPL-MCNC: 41 MG/DL (ref 65–140)
GLUCOSE SERPL-MCNC: 67 MG/DL (ref 65–140)
HCT VFR BLD AUTO: 40.8 % (ref 36.5–49.3)
HGB BLD-MCNC: 11.8 G/DL (ref 12–17)
LYMPHOCYTES # BLD AUTO: 0.99 THOUSAND/UL (ref 0.6–4.47)
LYMPHOCYTES # BLD AUTO: 11 % (ref 14–44)
MCH RBC QN AUTO: 24.4 PG (ref 26.8–34.3)
MCHC RBC AUTO-ENTMCNC: 28.9 G/DL (ref 31.4–37.4)
MCV RBC AUTO: 84 FL (ref 82–98)
MONOCYTES # BLD AUTO: 0.81 THOUSAND/UL (ref 0–1.22)
MONOCYTES NFR BLD: 9 % (ref 4–12)
NEUTROPHILS # BLD MANUAL: 7.11 THOUSAND/UL (ref 1.85–7.62)
NEUTS SEG NFR BLD AUTO: 79 % (ref 43–75)
NRBC BLD AUTO-RTO: 0 /100 WBCS
OVALOCYTES BLD QL SMEAR: PRESENT
PLATELET # BLD AUTO: 151 THOUSANDS/UL (ref 149–390)
PLATELET BLD QL SMEAR: ADEQUATE
PMV BLD AUTO: 9.2 FL (ref 8.9–12.7)
POIKILOCYTOSIS BLD QL SMEAR: PRESENT
POTASSIUM SERPL-SCNC: 4.3 MMOL/L (ref 3.5–5.3)
PROT SERPL-MCNC: 7.3 G/DL (ref 6.4–8.2)
RBC # BLD AUTO: 4.84 MILLION/UL (ref 3.88–5.62)
RBC MORPH BLD: PRESENT
SODIUM SERPL-SCNC: 140 MMOL/L (ref 136–145)
WBC # BLD AUTO: 9 THOUSAND/UL (ref 4.31–10.16)

## 2018-02-03 PROCEDURE — 99232 SBSQ HOSP IP/OBS MODERATE 35: CPT | Performed by: NURSE PRACTITIONER

## 2018-02-03 PROCEDURE — 85027 COMPLETE CBC AUTOMATED: CPT | Performed by: HOSPITALIST

## 2018-02-03 PROCEDURE — 94760 N-INVAS EAR/PLS OXIMETRY 1: CPT

## 2018-02-03 PROCEDURE — 85007 BL SMEAR W/DIFF WBC COUNT: CPT | Performed by: HOSPITALIST

## 2018-02-03 PROCEDURE — 99232 SBSQ HOSP IP/OBS MODERATE 35: CPT | Performed by: INTERNAL MEDICINE

## 2018-02-03 PROCEDURE — 80053 COMPREHEN METABOLIC PANEL: CPT | Performed by: HOSPITALIST

## 2018-02-03 PROCEDURE — 82948 REAGENT STRIP/BLOOD GLUCOSE: CPT

## 2018-02-03 RX ADMIN — TIOTROPIUM BROMIDE 18 MCG: 18 CAPSULE ORAL; RESPIRATORY (INHALATION) at 08:44

## 2018-02-03 RX ADMIN — INSULIN LISPRO 3 UNITS: 100 INJECTION, SOLUTION INTRAVENOUS; SUBCUTANEOUS at 21:09

## 2018-02-03 RX ADMIN — ASPIRIN 81 MG: 81 TABLET, COATED ORAL at 08:43

## 2018-02-03 RX ADMIN — INSULIN LISPRO 2 UNITS: 100 INJECTION, SOLUTION INTRAVENOUS; SUBCUTANEOUS at 12:20

## 2018-02-03 RX ADMIN — INSULIN LISPRO 2 UNITS: 100 INJECTION, SOLUTION INTRAVENOUS; SUBCUTANEOUS at 17:22

## 2018-02-03 RX ADMIN — Medication 325 MG: at 08:43

## 2018-02-03 RX ADMIN — CYCLOBENZAPRINE HYDROCHLORIDE 10 MG: 10 TABLET, FILM COATED ORAL at 21:09

## 2018-02-03 RX ADMIN — GABAPENTIN 300 MG: 300 CAPSULE ORAL at 21:09

## 2018-02-03 RX ADMIN — HEPARIN SODIUM 5000 UNITS: 5000 INJECTION, SOLUTION INTRAVENOUS; SUBCUTANEOUS at 21:09

## 2018-02-03 RX ADMIN — BUDESONIDE AND FORMOTEROL FUMARATE DIHYDRATE 2 PUFF: 160; 4.5 AEROSOL RESPIRATORY (INHALATION) at 08:44

## 2018-02-03 RX ADMIN — INSULIN HUMAN 25 UNITS: 500 INJECTION, SOLUTION SUBCUTANEOUS at 17:23

## 2018-02-03 RX ADMIN — METOPROLOL TARTRATE 25 MG: 25 TABLET ORAL at 08:44

## 2018-02-03 RX ADMIN — PANTOPRAZOLE SODIUM 40 MG: 40 TABLET, DELAYED RELEASE ORAL at 17:21

## 2018-02-03 RX ADMIN — BUDESONIDE AND FORMOTEROL FUMARATE DIHYDRATE 2 PUFF: 160; 4.5 AEROSOL RESPIRATORY (INHALATION) at 17:21

## 2018-02-03 RX ADMIN — METOPROLOL TARTRATE 25 MG: 25 TABLET ORAL at 20:54

## 2018-02-03 RX ADMIN — OXYCODONE HYDROCHLORIDE 10 MG: 5 SOLUTION ORAL at 20:54

## 2018-02-03 RX ADMIN — HEPARIN SODIUM 5000 UNITS: 5000 INJECTION, SOLUTION INTRAVENOUS; SUBCUTANEOUS at 06:18

## 2018-02-03 RX ADMIN — PANTOPRAZOLE SODIUM 40 MG: 40 TABLET, DELAYED RELEASE ORAL at 06:18

## 2018-02-03 RX ADMIN — FUROSEMIDE 40 MG: 10 INJECTION, SOLUTION INTRAMUSCULAR; INTRAVENOUS at 08:43

## 2018-02-03 RX ADMIN — HEPARIN SODIUM 5000 UNITS: 5000 INJECTION, SOLUTION INTRAVENOUS; SUBCUTANEOUS at 14:39

## 2018-02-03 RX ADMIN — PRAVASTATIN SODIUM 40 MG: 40 TABLET ORAL at 17:21

## 2018-02-03 RX ADMIN — POTASSIUM CHLORIDE 10 MEQ: 750 TABLET, EXTENDED RELEASE ORAL at 08:43

## 2018-02-03 RX ADMIN — FUROSEMIDE 40 MG: 10 INJECTION, SOLUTION INTRAMUSCULAR; INTRAVENOUS at 17:21

## 2018-02-03 NOTE — ASSESSMENT & PLAN NOTE
· Creat baseline appears to be ~1 2-1 4  · Creat improved today to 1 27  · Continue IV diuretics  · Follow-up BMP in am  · Avoid any further nephrotoxic drugs or hypotension

## 2018-02-03 NOTE — ASSESSMENT & PLAN NOTE
· Stable  · Continue symbicort, spiriva, nebs  · Pt is on home o2= 2-3 liters NC  · Quit smoking 6 mos ago  · Follows outpt with Dr Bermeo Notice

## 2018-02-03 NOTE — PROGRESS NOTES
Heart Failure Service Progress Note - Pawan Addison Sr  81 y o  male MRN: [id number]    [facility] Encounter: [id number]      Assessment:    Principal Problem:    Acute on chronic diastolic congestive heart failure ([facility] [address] )  Active Problems:    Obesity (BMI 30-39  9)    Type 2 diabetes mellitus with complication (HCC)    COPD (chronic obstructive pulmonary disease) (HCC)    Bilateral lower extremity edema    Chronic kidney disease, stage 3    Non-ST elevation myocardial infarction (NSTEMI), type 2 ([facility] [address] )    Poor dentition   Wan Daniels  is a 81y o  year old male who presents with a past medical history significant for CAD status post PCI with stents in RCA and Diagonal 1, COPD, Chronic Hypoxemic Respiratory failure [on home oxygen 2 5-3 L], morbid obesity, Type 2 Diabetes Mellitus, Hypertension, and Hyperlipidemia who presents for increased bilateral leg swelling and shortness of breath  1  Acute on chronic diastolic CHF due to non compliance with diuretics  Home lasix 40 mg daily  2  Acute on chronic hypoxic resp failure- home oxygen  3  Mild trop increase due to increased filling pressures and subendocardial ischemia  4  HTN- near goal  5  CAD - PCI 6 years ago  6  COPD- previous tobacco use  7  Wilder on CKD 4  Plan:  Continue diuresis today    Subjective:   Patient seen and examined  No significant events overnight  Objective:   Negative 3 5 liters- on lasix 40 mg IV BID  Weight 280 lbs  Cr improved with venous unloading    Central Line (day, reason): Cook catheter (day, reason):    Vitals: Blood pressure 129/67, pulse 74, temperature 98 °F (36 7 °C), temperature source Oral, resp  rate 16, height 6' (1 829 m), weight 127 kg (280 lb 13 9 oz), SpO2 90 %  , Body mass index is 38 09 kg/m² , I/O last 3 completed shifts:   In: 2612 [P O :1591]  Out: 5400 [Urine:5400]  I/O this shift:  In: 320 [P O :320]  Out: 1950 [Urine:1950]  Wt Readings from Last 3 Encounters:   02/03/18 127 kg (280 lb 13 9 oz)   11/28/17 127 kg (279 lb 4 oz)   11/21/17 126 kg (278 lb 7 oz)       Intake/Output Summary (Last 24 hours) at 02/03/18 1336  Last data filed at 02/03/18 1222   Gross per 24 hour   Intake             1240 ml   Output             5425 ml   Net            -4185 ml     I/O last 3 completed shifts: In: 1431 [P O :1591]  Out: 5 [Urine:5400]    No significant arrhythmias seen on telemetry review         Physical Exam:  Vitals:    02/03/18 0345 02/03/18 0829 02/03/18 0948 02/03/18 1222   BP: 142/67 158/87  129/67   BP Location:       Pulse: 62 90  74   Resp: 18 18  16   Temp: 97 8 °F (36 6 °C) 97 9 °F (36 6 °C)  98 °F (36 7 °C)   TempSrc: Oral Oral  Oral   SpO2: 92% 95%  90%   Weight:   127 kg (280 lb 13 9 oz)    Height:           GEN: Stu Melendrez Sr  appears well, alert and oriented x 3, pleasant and cooperative   HEENT: pupils equal, round, and reactive to light; extraocular muscles intact  NECK: supple, no carotid bruits   HEART: regular rhythm, normal S1 and S2, no murmurs, clicks, gallops or rubs, JVP is    LUNGS: clear to auscultation bilaterally; no wheezes, rales, or rhonchi   ABDOMEN: normal bowel sounds, soft, no tenderness, no distention  EXTREMITIES: peripheral pulses normal; no clubbing, cyanosis, or edema  NEURO: no focal findings   SKIN: normal without suspicious lesions on exposed skin      Current Facility-Administered Medications:     acetaminophen (TYLENOL) tablet 650 mg, 650 mg, Oral, Q4H PRN, Mariana Mata MD    albuterol inhalation solution 2 5 mg, 2 5 mg, Nebulization, Q4H PRN, Mariana Mata MD    aspirin (ECOTRIN LOW STRENGTH) EC tablet 81 mg, 81 mg, Oral, Daily, Mariana Mata MD, 81 mg at 02/03/18 0843    benzocaine (ANBESOL) 10 % mucosal liquid, , Mucosal, 4x Daily PRN, YOLANDE Crain    budesonide-formoterol (SYMBICORT) 160-4 5 mcg/act inhaler 2 puff, 2 puff, Inhalation, BID, Mariana Mata MD, 2 puff at 02/03/18 7053    cyclobenzaprine (FLEXERIL) tablet 10 mg, 10 mg, Oral, HS, Jenaro Piña MD, 10 mg at 02/02/18 2132    ferrous sulfate tablet 325 mg, 325 mg, Oral, Daily With Breakfast, Jenaro Piña MD, 325 mg at 02/03/18 0843    furosemide (LASIX) injection 40 mg, 40 mg, Intravenous, BID, Jenaro Piña MD, 40 mg at 02/03/18 0843    gabapentin (NEURONTIN) capsule 300 mg, 300 mg, Oral, HS, Jenaro Piña MD, 300 mg at 02/02/18 2132    heparin (porcine) subcutaneous injection 5,000 Units, 5,000 Units, Subcutaneous, Q8H [address], 5,000 Units at 02/03/18 0618 **AND** Platelet count, , , Once, Jenaro Piña MD    insulin lispro (HumaLOG) 100 units/mL subcutaneous injection 1-5 Units, 1-5 Units, Subcutaneous, TID AC, 2 Units at 02/03/18 1220 **AND** Fingerstick Glucose (POCT), , , TID AC, Merlin Meeks MD    insulin lispro (HumaLOG) 100 units/mL subcutaneous injection 1-5 Units, 1-5 Units, Subcutaneous, HS, Merlin Meeks MD    insulin regular (HumuLIN R U-500) 500 units/mL CONCENTRATED injection 25 Units, 25 Units, Subcutaneous, Before Dinner, YOLANDE Bernabe    insulin regular (HumuLIN R U-500) 500 units/mL CONCENTRATED injection 45 Units, 45 Units, Subcutaneous, BID before breakfast/lunch, YOLANDE Bernabe    metoprolol tartrate (LOPRESSOR) tablet 25 mg, 25 mg, Oral, Q12H [address], Jenaro Piña MD, 25 mg at 02/03/18 0844    oxyCODONE (ROXICODONE) IR tablet 5 mg, 5 mg, Oral, Q4H PRN, Jenaro Piña MD    oxyCODONE (ROXICODONE) oral solution 10 mg, 10 mg, Oral, Q4H PRN, Jenaro Piña MD, 10 mg at 02/02/18 2133    pantoprazole (PROTONIX) EC tablet 40 mg, 40 mg, Oral, BID AC, Jenaro Piña MD, 40 mg at 02/03/18 0618    potassium chloride (K-DUR,KLOR-CON) CR tablet 10 mEq, 10 mEq, Oral, Daily, Jenaro Piña MD, 10 mEq at 02/03/18 0843    pravastatin (PRAVACHOL) tablet 40 mg, 40 mg, Oral, Daily With Al Meeks MD, 40 mg at 02/02/18 1725    tiotropium (SPIRIVA) capsule for inhaler 18 mcg, 18 mcg, Inhalation, Daily, Mary Ann Pierce MD, 18 mcg at 02/03/18 0844      Labs & Results:      Results from last 7 days  Lab Units 02/02/18  0239 02/01/18  2332 02/01/18  2043   TROPONIN I ng/mL 0 20* 0 25* 0 27*     Results from last 7 days  Lab Units 02/03/18  0502 02/02/18  0239 02/01/18  2043 02/01/18  1400   WBC Thousand/uL 9 00 9 62  --  11 69*   HEMOGLOBIN g/dL 11 8* 11 6*  --  13 0   HEMATOCRIT % 40 8 40 6  --  43 8   PLATELETS Thousands/uL 151 145* 164 172           Results from last 7 days  Lab Units 02/03/18  0502 02/01/18 2043 02/01/18  1400   SODIUM mmol/L 140 137 140   POTASSIUM mmol/L 4 3 4 2 4 7   CHLORIDE mmol/L 96* 96* 100   CO2 mmol/L 43* 41* 37*   BUN mg/dL 19 18 19   CREATININE mg/dL 1 27 1 43* 1 46*   CALCIUM mg/dL 8 1* 8 3 8 2*   TOTAL PROTEIN g/dL 7 3  --  7 8   BILIRUBIN TOTAL mg/dL 0 49  --  0 27   ALK PHOS U/L 63  --  72   ALT U/L 35  --  46   AST U/L 69*  --  55*   GLUCOSE RANDOM mg/dL 41* 123 95         Chest X-Ray- congestion    Echo 9/29/17  LVEF: 65%  LVIDd:  RV:  MR:  PASP:  RVOT:   Other:    CT Chest - right pleural effusion    EKG personally reviewed by Dilip Santamaria, DO  Early r wave transition likely due to rvh    Counseling / Coordination of Care  Total floor / unit time spent today 25 minutes  Greater than 50% of total time was spent with the patient and / or family counseling and / or coordination of care  A description of the counseling / coordination of care: 15  Thank you for the opportunity to participate in the care of this patient  Dilip MAO    Director Heart Failure/ Medical Director [address]

## 2018-02-03 NOTE — ASSESSMENT & PLAN NOTE
· noncompliant with Lasix prior to coming in   · Pending am wt, pt initially refused and is now willing to have it checked by RN- discussed the importance of checking it daily in the am with patient and he agreed   · C/w lasix 40mg IV BID   · Cardiology following  · Monitor input output

## 2018-02-03 NOTE — ASSESSMENT & PLAN NOTE
· (+) hypoglycemia this am    · Decrease humulin R to 45 units with breakfast and lunch and 25 units with dinner     · Continue blood glucose monitor  · Insulin sliding scale as needed

## 2018-02-03 NOTE — PROGRESS NOTES
Progress Note - Micky Mata Sr  1945, 81 y o  male MRN: [id number]    Unit/Bed#: [facility] 530-01 Encounter: [id number]    Primary Care Provider: Leticia Amato MD   Date and time admitted to hospital: 2/1/2018  1:22 PM        * Acute on chronic diastolic congestive heart failure ([place] [address] )   Assessment & Plan    · noncompliant with Lasix prior to coming in   · Pending am wt, pt initially refused and is now willing to have it checked by RN- discussed the importance of checking it daily in the am with patient and he agreed   · C/w lasix 40mg IV BID   · Cardiology following  · Monitor input output          Poor dentition   Assessment & Plan    · Monitor teeth  · Currently no significant signs of infection, low threshold for starting abxs   · Outpatient f/u with dentist after discharge         Non-ST elevation myocardial infarction (NSTEMI), type 2 ([place] [address] )   Assessment & Plan    · In the setting of heart failure   · Cardiology following, no further w/u        Chronic kidney disease, stage 3   Assessment & Plan    · Creat baseline appears to be ~1 2-1 4  · Creat improved today to 1 27  · Continue IV diuretics  · Follow-up BMP in am  · Avoid any further nephrotoxic drugs or hypotension        Bilateral lower extremity edema   Assessment & Plan    · wound care  · Compression bandages, compression stockings  · Elevate legs          COPD (chronic obstructive pulmonary disease) ([facility])   Assessment & Plan    · Stable  · Continue symbicort, spiriva, nebs  · Pt is on home o2= 2-3 liters NC  · Quit smoking 6 mos ago  · Follows outpt with Dr Rasheed Screws        Type 2 diabetes mellitus with complication ([place] [address] )   Assessment & Plan    · (+) hypoglycemia this am    · Decrease humulin R to 45 units with breakfast and lunch and 25 units with dinner  · Continue blood glucose monitor  · Insulin sliding scale as needed         Obesity (BMI 30-39  9)   Assessment & Plan    · Morbid obesity- BMI 39  · Lifestyle modification  · diet exercise and weight loss            VTE Pharmacologic Prophylaxis:   Pharmacologic: Heparin  Mechanical VTE Prophylaxis in Place: No    Patient Centered Rounds: I have performed bedside rounds with nursing staff today  Discussions with Specialists or Other Care Team Provider: d/w rn     Education and Discussions with Family / Patient: d/w patient and wife at bedside     Time Spent for Care: 30 minutes  More than 50% of total time spent on counseling and coordination of care as described above  Current Length of Stay: 2 day(s)    Current Patient Status: Inpatient   Certification Statement: The patient will continue to require additional inpatient hospital stay due to iv diuretics    Discharge Plan: not stable for discharge     Code Status: Level 1 - Full Code      Subjective:   Pt reports he still has significant swelling in his legs  Pt reports that he needs to have his one tooth removed because it is "rotted " Denies any other complaints, (-) CP, (-) SOB  On 2 liters NC currently  Per RN pts blood sugar was 67 this am and insulin was held  Per wife his blood sugars are typically uncontrolled when he goes into heart failure or develops an infection  Sometimes the wife reports hypoglycemia as low as 47 at home in the am      Objective:     Vitals:   Temp (24hrs), Av 2 °F (36 8 °C), Min:97 8 °F (36 6 °C), Max:98 8 °F (37 1 °C)    HR:  [62-90] 90  Resp:  [18-20] 18  BP: (120-158)/(59-87) 158/87  SpO2:  [92 %-95 %] 95 %  Body mass index is 38 09 kg/m²  Input and Output Summary (last 24 hours): Intake/Output Summary (Last 24 hours) at 18 1002  Last data filed at 18 0948   Gross per 24 hour   Intake             1240 ml   Output             4975 ml   Net            -3735 ml       Physical Exam:     Physical Exam   Constitutional: He is oriented to person, place, and time  No distress  HENT:   Mouth/Throat: Abnormal dentition  Dental caries present  No dental abscesses     Cardiovascular: Normal rate, regular rhythm, normal heart sounds and intact distal pulses  No murmur heard  Pulmonary/Chest: Effort normal and breath sounds normal  No respiratory distress  He has no wheezes  He has no rales  2 liters NC    Abdominal: Soft  Bowel sounds are normal  He exhibits no distension  There is no tenderness  There is no rebound  Musculoskeletal: He exhibits edema (b/l lower extremities- moderate edema)  He exhibits no tenderness  Neurological: He is alert and oriented to person, place, and time  No cranial nerve deficit  Skin: Skin is warm  He is not diaphoretic  B/l lower extremity wounds with dressings dry and intact and teds in place    Psychiatric: He has a normal mood and affect  Additional Data:     Labs:      Results from last 7 days  Lab Units 02/03/18  0502  02/01/18  1400   WBC Thousand/uL 9 00  < > 11 69*   HEMOGLOBIN g/dL 11 8*  < > 13 0   HEMATOCRIT % 40 8  < > 43 8   PLATELETS Thousands/uL 151  < > 172   NEUTROS PCT %  --   --  86*   LYMPHS PCT %  --   --  7*   LYMPHO PCT % 11*  --   --    MONOS PCT %  --   --  7   MONO PCT MAN % 9  --   --    EOS PCT %  --   --  0   EOSINO PCT MANUAL % 1  --   --    < > = values in this interval not displayed  Results from last 7 days  Lab Units 02/03/18  0502   SODIUM mmol/L 140   POTASSIUM mmol/L 4 3   CHLORIDE mmol/L 96*   CO2 mmol/L 43*   BUN mg/dL 19   CREATININE mg/dL 1 27   CALCIUM mg/dL 8 1*   TOTAL PROTEIN g/dL 7 3   BILIRUBIN TOTAL mg/dL 0 49   ALK PHOS U/L 63   ALT U/L 35   AST U/L 69*   GLUCOSE RANDOM mg/dL 41*           * I Have Reviewed All Lab Data Listed Above  * Additional Pertinent Lab Tests Reviewed:  All Labs Within Last 24 Hours Reviewed    Imaging:    Imaging Reports Reviewed Today Include: chest xray    Recent Cultures (last 7 days):           Last 24 Hours Medication List:     Current Facility-Administered Medications:  acetaminophen 650 mg Oral Q4H PRN Neena Burger MD   albuterol 2 5 mg Nebulization Q4H PRN Laura ZHANG MD Jeanna   aspirin 81 mg Oral Daily Caterina Barrett MD   benzocaine  Mucosal 4x Daily PRN YOLANDE Goncalves   budesonide-formoterol 2 puff Inhalation BID Caterina Barrett MD   cyclobenzaprine 10 mg Oral HS Apollo Meeks MD   ferrous sulfate 325 mg Oral Daily With Carolina Larose MD   furosemide 40 mg Intravenous BID Caterina Barrett MD   gabapentin 300 mg Oral HS Caterina Barrett MD   heparin (porcine) 5,000 Units Subcutaneous Q8H [address] Caterina Barrett MD   insulin lispro 1-5 Units Subcutaneous TID Alfreida Arun Meeks MD   insulin lispro 1-5 Units Subcutaneous HS Caterina Barrett MD   insulin regular 35 Units Subcutaneous Before Pamela Alford MD   insulin regular 55 Units Subcutaneous BID before breakfast/lunch Deniz Romeo PA-C   metoprolol tartrate 25 mg Oral Q12H [address] Merlin Meeks MD   oxyCODONE 5 mg Oral Q4H PRN Caterina Barrett MD   oxyCODONE 10 mg Oral Q4H PRN Caterina Barrett MD   pantoprazole 40 mg Oral BID AC Apollo Meeks MD   potassium chloride 10 mEq Oral Daily Caterina Barrett MD   pravastatin 40 mg Oral Daily With Pamela Alford MD   tiotropium 18 mcg Inhalation Daily Caterina Barrett MD        Today, Patient Was Seen By: YOLANDE Flores    ** Please Note: Dictation voice to text software may have been used in the creation of this document   **

## 2018-02-03 NOTE — ASSESSMENT & PLAN NOTE
· Monitor teeth  · Currently no significant signs of infection, low threshold for starting abxs   · Outpatient f/u with dentist after discharge

## 2018-02-04 LAB
ANION GAP SERPL CALCULATED.3IONS-SCNC: 2 MMOL/L (ref 4–13)
BUN SERPL-MCNC: 17 MG/DL (ref 5–25)
CALCIUM SERPL-MCNC: 8.5 MG/DL (ref 8.3–10.1)
CHLORIDE SERPL-SCNC: 89 MMOL/L (ref 100–108)
CO2 SERPL-SCNC: 43 MMOL/L (ref 21–32)
CREAT SERPL-MCNC: 1.4 MG/DL (ref 0.6–1.3)
ERYTHROCYTE [DISTWIDTH] IN BLOOD BY AUTOMATED COUNT: 16.4 % (ref 11.6–15.1)
GFR SERPL CREATININE-BSD FRML MDRD: 51 ML/MIN/1.73SQ M
GLUCOSE SERPL-MCNC: 155 MG/DL (ref 65–140)
GLUCOSE SERPL-MCNC: 161 MG/DL (ref 65–140)
GLUCOSE SERPL-MCNC: 191 MG/DL (ref 65–140)
GLUCOSE SERPL-MCNC: 208 MG/DL (ref 65–140)
GLUCOSE SERPL-MCNC: 258 MG/DL (ref 65–140)
HCT VFR BLD AUTO: 42.3 % (ref 36.5–49.3)
HGB BLD-MCNC: 12.3 G/DL (ref 12–17)
MCH RBC QN AUTO: 24.2 PG (ref 26.8–34.3)
MCHC RBC AUTO-ENTMCNC: 29.1 G/DL (ref 31.4–37.4)
MCV RBC AUTO: 83 FL (ref 82–98)
PLATELET # BLD AUTO: 148 THOUSANDS/UL (ref 149–390)
PMV BLD AUTO: 9.6 FL (ref 8.9–12.7)
POTASSIUM SERPL-SCNC: 4.4 MMOL/L (ref 3.5–5.3)
RBC # BLD AUTO: 5.09 MILLION/UL (ref 3.88–5.62)
SODIUM SERPL-SCNC: 134 MMOL/L (ref 136–145)
WBC # BLD AUTO: 8.49 THOUSAND/UL (ref 4.31–10.16)

## 2018-02-04 PROCEDURE — 85027 COMPLETE CBC AUTOMATED: CPT | Performed by: NURSE PRACTITIONER

## 2018-02-04 PROCEDURE — 80048 BASIC METABOLIC PNL TOTAL CA: CPT | Performed by: NURSE PRACTITIONER

## 2018-02-04 PROCEDURE — 94760 N-INVAS EAR/PLS OXIMETRY 1: CPT

## 2018-02-04 PROCEDURE — 99232 SBSQ HOSP IP/OBS MODERATE 35: CPT | Performed by: INTERNAL MEDICINE

## 2018-02-04 PROCEDURE — 82948 REAGENT STRIP/BLOOD GLUCOSE: CPT

## 2018-02-04 PROCEDURE — 94640 AIRWAY INHALATION TREATMENT: CPT

## 2018-02-04 PROCEDURE — 99232 SBSQ HOSP IP/OBS MODERATE 35: CPT | Performed by: HOSPITALIST

## 2018-02-04 RX ORDER — LEVALBUTEROL 1.25 MG/.5ML
1.25 SOLUTION, CONCENTRATE RESPIRATORY (INHALATION)
Status: DISCONTINUED | OUTPATIENT
Start: 2018-02-05 | End: 2018-02-12 | Stop reason: HOSPADM

## 2018-02-04 RX ORDER — FLUTICASONE PROPIONATE 50 MCG
1 SPRAY, SUSPENSION (ML) NASAL DAILY
Status: DISCONTINUED | OUTPATIENT
Start: 2018-02-04 | End: 2018-02-12 | Stop reason: HOSPADM

## 2018-02-04 RX ORDER — ACETAZOLAMIDE 500 MG/5ML
250 INJECTION, POWDER, LYOPHILIZED, FOR SOLUTION INTRAVENOUS EVERY 12 HOURS SCHEDULED
Status: DISCONTINUED | OUTPATIENT
Start: 2018-02-04 | End: 2018-02-10

## 2018-02-04 RX ORDER — SODIUM CHLORIDE FOR INHALATION 0.9 %
3 VIAL, NEBULIZER (ML) INHALATION
Status: DISCONTINUED | OUTPATIENT
Start: 2018-02-05 | End: 2018-02-12 | Stop reason: HOSPADM

## 2018-02-04 RX ADMIN — OXYCODONE HYDROCHLORIDE 5 MG: 5 TABLET ORAL at 21:25

## 2018-02-04 RX ADMIN — INSULIN HUMAN 25 UNITS: 500 INJECTION, SOLUTION SUBCUTANEOUS at 17:35

## 2018-02-04 RX ADMIN — FUROSEMIDE 40 MG: 10 INJECTION, SOLUTION INTRAMUSCULAR; INTRAVENOUS at 08:12

## 2018-02-04 RX ADMIN — TIOTROPIUM BROMIDE 18 MCG: 18 CAPSULE ORAL; RESPIRATORY (INHALATION) at 08:12

## 2018-02-04 RX ADMIN — PRAVASTATIN SODIUM 40 MG: 40 TABLET ORAL at 17:11

## 2018-02-04 RX ADMIN — Medication 325 MG: at 08:12

## 2018-02-04 RX ADMIN — ASPIRIN 81 MG: 81 TABLET, COATED ORAL at 08:12

## 2018-02-04 RX ADMIN — PANTOPRAZOLE SODIUM 40 MG: 40 TABLET, DELAYED RELEASE ORAL at 17:10

## 2018-02-04 RX ADMIN — INSULIN LISPRO 2 UNITS: 100 INJECTION, SOLUTION INTRAVENOUS; SUBCUTANEOUS at 12:17

## 2018-02-04 RX ADMIN — HEPARIN SODIUM 5000 UNITS: 5000 INJECTION, SOLUTION INTRAVENOUS; SUBCUTANEOUS at 13:56

## 2018-02-04 RX ADMIN — OXYCODONE HYDROCHLORIDE 10 MG: 5 SOLUTION ORAL at 04:29

## 2018-02-04 RX ADMIN — ACETAZOLAMIDE 250 MG: 500 INJECTION, POWDER, LYOPHILIZED, FOR SOLUTION INTRAVENOUS at 21:24

## 2018-02-04 RX ADMIN — WATER 5 ML: 1 INJECTION INTRAMUSCULAR; INTRAVENOUS; SUBCUTANEOUS at 23:35

## 2018-02-04 RX ADMIN — HEPARIN SODIUM 5000 UNITS: 5000 INJECTION, SOLUTION INTRAVENOUS; SUBCUTANEOUS at 21:22

## 2018-02-04 RX ADMIN — INSULIN LISPRO 1 UNITS: 100 INJECTION, SOLUTION INTRAVENOUS; SUBCUTANEOUS at 17:11

## 2018-02-04 RX ADMIN — INSULIN LISPRO 1 UNITS: 100 INJECTION, SOLUTION INTRAVENOUS; SUBCUTANEOUS at 21:23

## 2018-02-04 RX ADMIN — FUROSEMIDE 40 MG: 10 INJECTION, SOLUTION INTRAMUSCULAR; INTRAVENOUS at 17:11

## 2018-02-04 RX ADMIN — ACETAZOLAMIDE 250 MG: 500 INJECTION, POWDER, LYOPHILIZED, FOR SOLUTION INTRAVENOUS at 14:27

## 2018-02-04 RX ADMIN — INSULIN HUMAN 45 UNITS: 500 INJECTION, SOLUTION SUBCUTANEOUS at 08:11

## 2018-02-04 RX ADMIN — BUDESONIDE AND FORMOTEROL FUMARATE DIHYDRATE 2 PUFF: 160; 4.5 AEROSOL RESPIRATORY (INHALATION) at 08:12

## 2018-02-04 RX ADMIN — POTASSIUM CHLORIDE 10 MEQ: 750 TABLET, EXTENDED RELEASE ORAL at 08:12

## 2018-02-04 RX ADMIN — ALBUTEROL SULFATE 2.5 MG: 2.5 SOLUTION RESPIRATORY (INHALATION) at 19:46

## 2018-02-04 RX ADMIN — METOPROLOL TARTRATE 25 MG: 25 TABLET ORAL at 08:12

## 2018-02-04 RX ADMIN — HEPARIN SODIUM 5000 UNITS: 5000 INJECTION, SOLUTION INTRAVENOUS; SUBCUTANEOUS at 06:22

## 2018-02-04 RX ADMIN — FLUTICASONE PROPIONATE 1 SPRAY: 50 SPRAY, METERED NASAL at 17:13

## 2018-02-04 RX ADMIN — BUDESONIDE AND FORMOTEROL FUMARATE DIHYDRATE 2 PUFF: 160; 4.5 AEROSOL RESPIRATORY (INHALATION) at 17:11

## 2018-02-04 RX ADMIN — GABAPENTIN 300 MG: 300 CAPSULE ORAL at 21:21

## 2018-02-04 RX ADMIN — INSULIN HUMAN 45 UNITS: 500 INJECTION, SOLUTION SUBCUTANEOUS at 12:18

## 2018-02-04 RX ADMIN — CYCLOBENZAPRINE HYDROCHLORIDE 10 MG: 10 TABLET, FILM COATED ORAL at 21:21

## 2018-02-04 RX ADMIN — PANTOPRAZOLE SODIUM 40 MG: 40 TABLET, DELAYED RELEASE ORAL at 06:22

## 2018-02-04 RX ADMIN — INSULIN LISPRO 1 UNITS: 100 INJECTION, SOLUTION INTRAVENOUS; SUBCUTANEOUS at 08:11

## 2018-02-04 RX ADMIN — METOPROLOL TARTRATE 25 MG: 25 TABLET ORAL at 21:21

## 2018-02-04 NOTE — ASSESSMENT & PLAN NOTE
· noncompliant with Lasix prior to coming in   · 1kg change in weight since admission  · C/w lasix 40mg IV BID   · Cardiology following  · Monitor input output/daily weights  · Oxygen supplementation

## 2018-02-04 NOTE — PROGRESS NOTES
Heart Failure Service Progress Note - Louie Jensen Sr  81 y o  male MRN: [id number]    [facility] Encounter: [id number]      Assessment:    Principal Problem:    Acute on chronic diastolic congestive heart failure ([facility] [address] )  Active Problems:    Obesity (BMI 30-39  9)    Type 2 diabetes mellitus with complication (HCC)    COPD (chronic obstructive pulmonary disease) (HCC)    Bilateral lower extremity edema    Chronic kidney disease, stage 3    Non-ST elevation myocardial infarction (NSTEMI), type 2 ([facility] [address] )    Poor dentition   Montez Cantu  is a 81y o  year old male who presents with a past medical history significant for CAD status post PCI with stents in RCA and Diagonal 1, COPD, Chronic Hypoxemic Respiratory failure [on home oxygen 2 5-3 L], morbid obesity, Type 2 Diabetes Mellitus, Hypertension, and Hyperlipidemia who presents for increased bilateral leg swelling and shortness of breath  1  Acute on chronic diastolic CHF due to non compliance with diuretics  Home lasix 40 mg daily  2  Acute on chronic hypoxic resp failure- home oxygen  3  Mild trop increase due to increased filling pressures and subendocardial ischemia  4  HTN- near goal  5  CAD - PCI 6 years ago  6  COPD- previous tobacco use  7  Wilder on CKD 4  Plan:  Continue diuretic, add diamox    Subjective:   Patient seen and examined  No significant events overnight  Objective:   Negative 3 more liters, down 6 7 liters- on lasix 40 mg IV BID  Weight 280 lbs  Cr improved with venous unloading- but slight bump now  co2 up    [facility] (day, reason): Cook catheter (day, reason):    Vitals: Blood pressure 119/57, pulse 81, temperature 98 5 °F (36 9 °C), resp  rate 18, height 6' (1 829 m), weight 128 kg (281 lb 12 oz), SpO2 94 %  , Body mass index is 38 21 kg/m² , I/O last 3 completed shifts:   In:  [P O :184]  Out: [address] [JZEY]  I/O this shift:  In: 180 [P O :180]  Out: 800 [Urine:800]  Wt Readings from Last 3 Encounters: 02/04/18 128 kg (281 lb 12 oz)   11/28/17 127 kg (279 lb 4 oz)   11/21/17 126 kg (278 lb 7 oz)       Intake/Output Summary (Last 24 hours) at 02/04/18 1222  Last data filed at 02/04/18 1215   Gross per 24 hour   Intake             1700 ml   Output             3675 ml   Net            -1975 ml     I/O last 3 completed shifts: In: [address] [P O :1840]  Out: 8344 [Urine:6975]    No significant arrhythmias seen on telemetry review         Physical Exam:  Vitals:    02/04/18 0340 02/04/18 0626 02/04/18 0723 02/04/18 1042   BP: 129/59  130/67 119/57   Pulse: 99  81 81   Resp: 20 18 18   Temp: 97 9 °F (36 6 °C)  98 2 °F (36 8 °C) 98 5 °F (36 9 °C)   TempSrc: Oral      SpO2:   91% 94%   Weight:  128 kg (281 lb 12 oz)     Height:           GEN: Timmy Part Sr  appears well, alert and oriented x 3, pleasant and cooperative   HEENT: pupils equal, round, and reactive to light; extraocular muscles intact  NECK: supple, no carotid bruits   HEART: regular rhythm, normal S1 and S2, no murmurs, clicks, gallops or rubs, JVP is    LUNGS: clear to auscultation bilaterally; no wheezes, rales, or rhonchi   ABDOMEN: normal bowel sounds, soft, no tenderness, no distention  EXTREMITIES: peripheral pulses normal; no clubbing, cyanosis, or edema  NEURO: no focal findings   SKIN: normal without suspicious lesions on exposed skin      Current Facility-Administered Medications:     acetaminophen (TYLENOL) tablet 650 mg, 650 mg, Oral, Q4H PRN, Riddhi Argueta MD    albuterol inhalation solution 2 5 mg, 2 5 mg, Nebulization, Q4H PRN, Riddhi Argueta MD    aspirin (ECOTRIN LOW STRENGTH) EC tablet 81 mg, 81 mg, Oral, Daily, Riddhi Argueta MD, 81 mg at 02/04/18 5100    benzocaine (ANBESOL) 10 % mucosal liquid, , Mucosal, 4x Daily PRN, Gray Meline, CRNP    budesonide-formoterol (SYMBICORT) 160-4 5 mcg/act inhaler 2 puff, 2 puff, Inhalation, BID, Riddhi Argueta MD, 2 puff at 02/04/18 0812    cyclobenzaprine (FLEXERIL) tablet 10 mg, 10 mg, Oral, HS, David Bui MD, 10 mg at 02/03/18 2109    ferrous sulfate tablet 325 mg, 325 mg, Oral, Daily With Breakfast, David Bui MD, 325 mg at 02/04/18 1884    furosemide (LASIX) injection 40 mg, 40 mg, Intravenous, BID, David Bui MD, 40 mg at 02/04/18 7655    gabapentin (NEURONTIN) capsule 300 mg, 300 mg, Oral, HS, David Bui MD, 300 mg at 02/03/18 2109    heparin (porcine) subcutaneous injection 5,000 Units, 5,000 Units, Subcutaneous, Q8H [facility], 5,000 Units at 02/04/18 0622 **AND** Platelet count, , , Once, David Bui MD    insulin lispro (HumaLOG) 100 units/mL subcutaneous injection 1-5 Units, 1-5 Units, Subcutaneous, TID AC, 2 Units at 02/04/18 1217 **AND** Fingerstick Glucose (POCT), , , TID AC, David Bui MD    insulin lispro (HumaLOG) 100 units/mL subcutaneous injection 1-5 Units, 1-5 Units, Subcutaneous, HS, David Bui MD, 3 Units at 02/03/18 2109    insulin regular (HumuLIN R U-500) 500 units/mL CONCENTRATED injection 25 Units, 25 Units, Subcutaneous, Before KathyYOLANDE Urias, 25 Units at 02/03/18 1723    insulin regular (HumuLIN R U-500) 500 units/mL CONCENTRATED injection 45 Units, 45 Units, Subcutaneous, BID before breakfast/lunch, YOLANDE Slaughter, 45 Units at 02/04/18 1218    metoprolol tartrate (LOPRESSOR) tablet 25 mg, 25 mg, Oral, Q12H [facility], David Bui MD, 25 mg at 02/04/18 0812    oxyCODONE (ROXICODONE) IR tablet 5 mg, 5 mg, Oral, Q4H PRN, David Bui MD    oxyCODONE (ROXICODONE) oral solution 10 mg, 10 mg, Oral, Q4H PRN, David Bui MD, 10 mg at 02/04/18 0429    pantoprazole (PROTONIX) EC tablet 40 mg, 40 mg, Oral, BID AC, David Bui MD, 40 mg at 02/04/18 0622    potassium chloride (K-DUR,KLOR-CON) CR tablet 10 mEq, 10 mEq, Oral, Daily, David Bui MD, 10 mEq at 02/04/18 3624    pravastatin (PRAVACHOL) tablet 40 mg, 40 mg, Oral, Daily With Haily Haque MD, 40 mg at 02/03/18 1721    tiotropium (SPIRIVA) capsule for inhaler 18 mcg, 18 mcg, Inhalation, Daily, Donna Adams MD, 18 mcg at 02/04/18 3394      Labs & Results:      Results from last 7 days  Lab Units 02/02/18  0239 02/01/18  2332 02/01/18  2043   TROPONIN I ng/mL 0 20* 0 25* 0 27*       Results from last 7 days  Lab Units 02/04/18  0433 02/03/18  0502 02/02/18  0239   WBC Thousand/uL 8 49 9 00 9 62   HEMOGLOBIN g/dL 12 3 11 8* 11 6*   HEMATOCRIT % 42 3 40 8 40 6   PLATELETS Thousands/uL 148* 151 145*           Results from last 7 days  Lab Units 02/04/18  0433 02/03/18  0502 02/01/18 2043 02/01/18  1400   SODIUM mmol/L 134* 140 137 140   POTASSIUM mmol/L 4 4 4 3 4 2 4 7   CHLORIDE mmol/L 89* 96* 96* 100   CO2 mmol/L 43* 43* 41* 37*   BUN mg/dL 17 19 18 19   CREATININE mg/dL 1 40* 1 27 1 43* 1 46*   CALCIUM mg/dL 8 5 8 1* 8 3 8 2*   TOTAL PROTEIN g/dL  --  7 3  --  7 8   BILIRUBIN TOTAL mg/dL  --  0 49  --  0 27   ALK PHOS U/L  --  63  --  72   ALT U/L  --  35  --  46   AST U/L  --  69*  --  55*   GLUCOSE RANDOM mg/dL 191* 41* 123 95         Chest X-Ray- congestion    Echo 9/29/17  LVEF: 65%  LVIDd:  RV:  MR:  PASP:  RVOT:   Other:    CT Chest - right pleural effusion    EKG personally reviewed by Edith Camargo, DO  Early r wave transition likely due to rvh    Counseling / Coordination of Care  Total floor / unit time spent today 25 minutes  Greater than 50% of total time was spent with the patient and / or family counseling and / or coordination of care  A description of the counseling / coordination of care: 15  Thank you for the opportunity to participate in the care of this patient  Edith MAO    Director Heart Failure/ Medical Director [address] [place] [address]

## 2018-02-04 NOTE — ASSESSMENT & PLAN NOTE
· hyperglcemia  · humulin R to 45 units with breakfast and lunch and 25 units with dinner, ISS  · Continue blood glucose monitoring

## 2018-02-04 NOTE — PROGRESS NOTES
Progress Note - Adelene Burkitt Sr  1945, 81 y o  male MRN: [id number]    Unit/Bed#: [facility] 530-01 Encounter: [id number]    Primary Care Provider: Mariaelena Pacheco MD   Date and time admitted to hospital: 2/1/2018  1:22 PM        * Acute on chronic diastolic congestive heart failure ([facility] )   Assessment & Plan    · noncompliant with Lasix prior to coming in   · 1kg change in weight since admission  · C/w lasix 40mg IV BID   · Cardiology following  · Monitor input output/daily weights  · Oxygen supplementation          Poor dentition   Assessment & Plan    · Poor oral hygiene  · Outpatient f/u with dentist after discharge         Non-ST elevation myocardial infarction (NSTEMI), type 2 ([place] [address] )   Assessment & Plan    · History of CAD status post PCI with stents in RCA and diagonal 1 In the setting of heart failure on this admission  · Cardiology following, no further w/u        Chronic kidney disease, stage 3   Assessment & Plan    · Creat baseline appears to be ~1 2-1 4  · BUN/Cr- 17/1 40, improving  · Continue IV diuretics  · Follow-up BMP in am  · Avoid any further nephrotoxic drugs or hypotension        Bilateral lower extremity edema   Assessment & Plan    · Legs continue to swollen due to fluid overloaded state  · wound care  · Compression bandages, compression stockings  · Elevate legs          COPD (chronic obstructive pulmonary disease) (HCC)   Assessment & Plan    · Stable  · Continue symbicort, spiriva, nebs  · Pt is on home o2= 2-3 liters NC  · Quit smoking 6 mos ago  · Follows outpt with Dr Priscila Lugo        Chronic respiratory failure with hypoxia ([place] [address] )   Assessment & Plan    Secondary to COPD  Stable  Continue oxygen supplementation, bronchodilation        Venous stasis dermatitis of both lower extremities   Assessment & Plan    Continue venous compression stockings, keep legs clean and dry bilaterally        Type 2 diabetes mellitus with complication (HCC)   Assessment & Plan    · hyperglcemia  · humulin R to 45 units with breakfast and lunch and 25 units with dinner, ISS  · Continue blood glucose monitoring        Dyslipidemia   Assessment & Plan    Stable  Continue statin        Obesity (BMI 30-39  9)   Assessment & Plan    · Morbid obesity- BMI 39  · Lifestyle modification  · diet exercise and weight loss            VTE Pharmacologic Prophylaxis:   Pharmacologic: Heparin  Mechanical VTE Prophylaxis in Place: No    Patient Centered Rounds: I have performed bedside rounds with nursing staff today  Discussions with Specialists or Other Care Team Provider:  Yes    Education and Discussions with Family / Patient:  Yes  Time Spent for Care: 45 minutes  More than 50% of total time spent on counseling and coordination of care as described above  Current Length of Stay: 3 day(s)    Current Patient Status: Inpatient   Certification Statement: The patient will continue to require additional inpatient hospital stay due to IV diuresis due to acute Congestive heart failure exacerbation    Discharge Plan:  Home after aggressive diuresis    Code Status: Level 1 - Full Code      Subjective:   He reported that he continues to have lower extremity swelling on both legs  Denied any shortness of breath, chest discomfort, changes in bowel or urine habits  Review of systems negative otherwise  Objective:     Vitals:   Temp (24hrs), Av 2 °F (36 8 °C), Min:97 9 °F (36 6 °C), Max:98 5 °F (36 9 °C)    HR:  [75-99] 81  Resp:  [18-22] 18  BP: (119-155)/(57-67) 119/57  SpO2:  [91 %-98 %] 94 %  Body mass index is 38 21 kg/m²  Input and Output Summary (last 24 hours): Intake/Output Summary (Last 24 hours) at 18 1311  Last data filed at 18 1215   Gross per 24 hour   Intake             1220 ml   Output             3675 ml   Net            -2455 ml       Physical Exam:     Physical Exam   Constitutional: He is oriented to person, place, and time  No distress  HENT:   Head: Normocephalic and atraumatic     Eyes: Conjunctivae and EOM are normal  Pupils are equal, round, and reactive to light  Neck: No JVD present  Cardiovascular: Normal rate and normal heart sounds  No murmur heard  Pulmonary/Chest: Effort normal  No respiratory distress  He has no wheezes  Diminished breath sounds bilaterally   Abdominal: Soft  Bowel sounds are normal  He exhibits no distension  There is no tenderness  Obese abdomen no organomegaly appreciated   Musculoskeletal: He exhibits edema  Neurological: He is alert and oriented to person, place, and time  He displays normal reflexes  He exhibits normal muscle tone  Skin: Skin is warm and dry  Lower extremity edema bilaterally   Psychiatric: He has a normal mood and affect  His behavior is normal  Judgment and thought content normal          Additional Data:     Labs:      Results from last 7 days  Lab Units 02/04/18  0433 02/03/18  0502  02/01/18  1400   WBC Thousand/uL 8 49 9 00  < > 11 69*   HEMOGLOBIN g/dL 12 3 11 8*  < > 13 0   HEMATOCRIT % 42 3 40 8  < > 43 8   PLATELETS Thousands/uL 148* 151  < > 172   NEUTROS PCT %  --   --   --  86*   LYMPHS PCT %  --   --   --  7*   LYMPHO PCT %  --  11*  --   --    MONOS PCT %  --   --   --  7   MONO PCT MAN %  --  9  --   --    EOS PCT %  --   --   --  0   EOSINO PCT MANUAL %  --  1  --   --    < > = values in this interval not displayed  Results from last 7 days  Lab Units 02/04/18  0433 02/03/18  0502   SODIUM mmol/L 134* 140   POTASSIUM mmol/L 4 4 4 3   CHLORIDE mmol/L 89* 96*   CO2 mmol/L 43* 43*   BUN mg/dL 17 19   CREATININE mg/dL 1 40* 1 27   CALCIUM mg/dL 8 5 8 1*   TOTAL PROTEIN g/dL  --  7 3   BILIRUBIN TOTAL mg/dL  --  0 49   ALK PHOS U/L  --  63   ALT U/L  --  35   AST U/L  --  69*   GLUCOSE RANDOM mg/dL 191* 41*           * I Have Reviewed All Lab Data Listed Above  * Additional Pertinent Lab Tests Reviewed:  All [facility] Admission Reviewed  Imaging:  Reviewed    Recent Cultures (last 7 days): Last 24 Hours Medication List:     Current Facility-Administered Medications:  acetaminophen 650 mg Oral Q4H PRN Scotty Ledesma MD   acetaZOLAMIDE 250 mg Intravenous Q12H [facility] Roselia Carpenter DO   albuterol 2 5 mg Nebulization Q4H PRN Scotty Ledesma MD   aspirin 81 mg Oral Daily Scotty Ledesma MD   benzocaine  Mucosal 4x Daily PRN YOLANDE Devine   budesonide-formoterol 2 puff Inhalation BID Scotty Ledesma MD   cyclobenzaprine 10 mg Oral HS Scotty Ledesma MD   ferrous sulfate 325 mg Oral Daily With Zeeshan Núñez MD   furosemide 40 mg Intravenous BID Scotty Ledesma MD   gabapentin 300 mg Oral HS Scotty Ledesma MD   heparin (porcine) 5,000 Units Subcutaneous Q8H [facility] Scotty Ledesma MD   insulin lispro 1-5 Units Subcutaneous TID Randal Meeks MD   insulin lispro 1-5 Units Subcutaneous HS Johnny Meeks MD   insulin regular 25 Units Subcutaneous Before Dinner YOLANDE Bernabe   insulin regular 45 Units Subcutaneous BID before breakfast/lunch YOLANDE Williamson   metoprolol tartrate 25 mg Oral Q12H [address] Benji Christine MD   oxyCODONE 5 mg Oral Q4H PRN Scotty Ledesma MD   oxyCODONE 10 mg Oral Q4H PRN Scotty Ledesma MD   pantoprazole 40 mg Oral BID AC Johnny Meeks MD   potassium chloride 10 mEq Oral Daily Scotty Ledesma MD   pravastatin 40 mg Oral Daily With Melvin Grace MD   tiotropium 18 mcg Inhalation Daily Scotty Ledesma MD        Today, Patient Was Seen By: Sheryl Medina MD    ** Please Note: Dictation voice to text software may have been used in the creation of this document   **

## 2018-02-04 NOTE — ASSESSMENT & PLAN NOTE
· Legs continue to swollen due to fluid overloaded state  · wound care  · Compression bandages, compression stockings  · Elevate legs

## 2018-02-04 NOTE — ASSESSMENT & PLAN NOTE
· Creat baseline appears to be ~1 2-1 4  · BUN/Cr- 17/1 40, improving  · Continue IV diuretics  · Follow-up BMP in am  · Avoid any further nephrotoxic drugs or hypotension

## 2018-02-04 NOTE — ASSESSMENT & PLAN NOTE
· History of CAD status post PCI with stents in RCA and diagonal 1 In the setting of heart failure on this admission  · Cardiology following, no further w/u

## 2018-02-04 NOTE — ASSESSMENT & PLAN NOTE
· Stable  · Continue symbicort, spiriva, nebs  · Pt is on home o2= 2-3 liters NC  · Quit smoking 6 mos ago  · Follows outpt with Dr Vik Garcia

## 2018-02-05 LAB
ALBUMIN SERPL BCP-MCNC: 3.1 G/DL (ref 3.5–5)
ALP SERPL-CCNC: 72 U/L (ref 46–116)
ALT SERPL W P-5'-P-CCNC: 37 U/L (ref 12–78)
ANION GAP SERPL CALCULATED.3IONS-SCNC: 3 MMOL/L (ref 4–13)
AST SERPL W P-5'-P-CCNC: 57 U/L (ref 5–45)
BASOPHILS # BLD AUTO: 0.02 THOUSANDS/ΜL (ref 0–0.1)
BASOPHILS NFR BLD AUTO: 0 % (ref 0–1)
BILIRUB SERPL-MCNC: 0.59 MG/DL (ref 0.2–1)
BUN SERPL-MCNC: 18 MG/DL (ref 5–25)
CALCIUM SERPL-MCNC: 8.9 MG/DL (ref 8.3–10.1)
CHLORIDE SERPL-SCNC: 92 MMOL/L (ref 100–108)
CO2 SERPL-SCNC: 38 MMOL/L (ref 21–32)
CREAT SERPL-MCNC: 1.44 MG/DL (ref 0.6–1.3)
EOSINOPHIL # BLD AUTO: 0.22 THOUSAND/ΜL (ref 0–0.61)
EOSINOPHIL NFR BLD AUTO: 2 % (ref 0–6)
ERYTHROCYTE [DISTWIDTH] IN BLOOD BY AUTOMATED COUNT: 16.3 % (ref 11.6–15.1)
GFR SERPL CREATININE-BSD FRML MDRD: 49 ML/MIN/1.73SQ M
GLUCOSE SERPL-MCNC: 116 MG/DL (ref 65–140)
GLUCOSE SERPL-MCNC: 161 MG/DL (ref 65–140)
GLUCOSE SERPL-MCNC: 163 MG/DL (ref 65–140)
GLUCOSE SERPL-MCNC: 165 MG/DL (ref 65–140)
GLUCOSE SERPL-MCNC: 246 MG/DL (ref 65–140)
HCT VFR BLD AUTO: 44.1 % (ref 36.5–49.3)
HGB BLD-MCNC: 13.2 G/DL (ref 12–17)
LYMPHOCYTES # BLD AUTO: 1.64 THOUSANDS/ΜL (ref 0.6–4.47)
LYMPHOCYTES NFR BLD AUTO: 14 % (ref 14–44)
MCH RBC QN AUTO: 24.8 PG (ref 26.8–34.3)
MCHC RBC AUTO-ENTMCNC: 29.9 G/DL (ref 31.4–37.4)
MCV RBC AUTO: 83 FL (ref 82–98)
MONOCYTES # BLD AUTO: 1.05 THOUSAND/ΜL (ref 0.17–1.22)
MONOCYTES NFR BLD AUTO: 9 % (ref 4–12)
NEUTROPHILS # BLD AUTO: 8.48 THOUSANDS/ΜL (ref 1.85–7.62)
NEUTS SEG NFR BLD AUTO: 75 % (ref 43–75)
NRBC BLD AUTO-RTO: 0 /100 WBCS
PLATELET # BLD AUTO: 173 THOUSANDS/UL (ref 149–390)
PMV BLD AUTO: 10.1 FL (ref 8.9–12.7)
POTASSIUM SERPL-SCNC: 4.4 MMOL/L (ref 3.5–5.3)
PROT SERPL-MCNC: 8.3 G/DL (ref 6.4–8.2)
RBC # BLD AUTO: 5.32 MILLION/UL (ref 3.88–5.62)
SODIUM SERPL-SCNC: 133 MMOL/L (ref 136–145)
WBC # BLD AUTO: 11.46 THOUSAND/UL (ref 4.31–10.16)

## 2018-02-05 PROCEDURE — 80053 COMPREHEN METABOLIC PANEL: CPT | Performed by: HOSPITALIST

## 2018-02-05 PROCEDURE — 99232 SBSQ HOSP IP/OBS MODERATE 35: CPT | Performed by: INTERNAL MEDICINE

## 2018-02-05 PROCEDURE — 85025 COMPLETE CBC W/AUTO DIFF WBC: CPT | Performed by: HOSPITALIST

## 2018-02-05 PROCEDURE — 94760 N-INVAS EAR/PLS OXIMETRY 1: CPT

## 2018-02-05 PROCEDURE — 82948 REAGENT STRIP/BLOOD GLUCOSE: CPT

## 2018-02-05 PROCEDURE — 99232 SBSQ HOSP IP/OBS MODERATE 35: CPT | Performed by: HOSPITALIST

## 2018-02-05 PROCEDURE — 94640 AIRWAY INHALATION TREATMENT: CPT

## 2018-02-05 RX ORDER — CYCLOBENZAPRINE HCL 10 MG
10 TABLET ORAL 2 TIMES DAILY
Status: DISCONTINUED | OUTPATIENT
Start: 2018-02-05 | End: 2018-02-12 | Stop reason: HOSPADM

## 2018-02-05 RX ORDER — ACETAMINOPHEN 325 MG/1
325 TABLET ORAL 2 TIMES DAILY
Status: DISCONTINUED | OUTPATIENT
Start: 2018-02-06 | End: 2018-02-05

## 2018-02-05 RX ORDER — ACETAMINOPHEN 325 MG/1
325 TABLET ORAL 2 TIMES DAILY
Status: DISCONTINUED | OUTPATIENT
Start: 2018-02-05 | End: 2018-02-12 | Stop reason: HOSPADM

## 2018-02-05 RX ORDER — OXYCODONE HYDROCHLORIDE 5 MG/1
7.5 TABLET ORAL 2 TIMES DAILY
Status: DISCONTINUED | OUTPATIENT
Start: 2018-02-05 | End: 2018-02-12 | Stop reason: HOSPADM

## 2018-02-05 RX ORDER — FUROSEMIDE 10 MG/ML
60 INJECTION INTRAMUSCULAR; INTRAVENOUS ONCE
Status: COMPLETED | OUTPATIENT
Start: 2018-02-05 | End: 2018-02-05

## 2018-02-05 RX ORDER — GABAPENTIN 300 MG/1
300 CAPSULE ORAL 3 TIMES DAILY
Status: DISCONTINUED | OUTPATIENT
Start: 2018-02-05 | End: 2018-02-09

## 2018-02-05 RX ADMIN — ACETAZOLAMIDE 250 MG: 500 INJECTION, POWDER, LYOPHILIZED, FOR SOLUTION INTRAVENOUS at 21:39

## 2018-02-05 RX ADMIN — ACETAMINOPHEN 325 MG: 325 TABLET, FILM COATED ORAL at 19:58

## 2018-02-05 RX ADMIN — OXYCODONE HYDROCHLORIDE 7.5 MG: 5 TABLET ORAL at 19:58

## 2018-02-05 RX ADMIN — BUDESONIDE AND FORMOTEROL FUMARATE DIHYDRATE 2 PUFF: 160; 4.5 AEROSOL RESPIRATORY (INHALATION) at 19:59

## 2018-02-05 RX ADMIN — FUROSEMIDE 40 MG: 10 INJECTION, SOLUTION INTRAMUSCULAR; INTRAVENOUS at 08:39

## 2018-02-05 RX ADMIN — TIOTROPIUM BROMIDE 18 MCG: 18 CAPSULE ORAL; RESPIRATORY (INHALATION) at 08:44

## 2018-02-05 RX ADMIN — INSULIN HUMAN 25 UNITS: 500 INJECTION, SOLUTION SUBCUTANEOUS at 16:56

## 2018-02-05 RX ADMIN — HEPARIN SODIUM 5000 UNITS: 5000 INJECTION, SOLUTION INTRAVENOUS; SUBCUTANEOUS at 06:08

## 2018-02-05 RX ADMIN — FUROSEMIDE 40 MG: 10 INJECTION, SOLUTION INTRAMUSCULAR; INTRAVENOUS at 17:02

## 2018-02-05 RX ADMIN — OXYCODONE HYDROCHLORIDE 10 MG: 5 SOLUTION ORAL at 06:13

## 2018-02-05 RX ADMIN — ISODIUM CHLORIDE 3 ML: 0.03 SOLUTION RESPIRATORY (INHALATION) at 19:16

## 2018-02-05 RX ADMIN — PANTOPRAZOLE SODIUM 40 MG: 40 TABLET, DELAYED RELEASE ORAL at 06:08

## 2018-02-05 RX ADMIN — METOPROLOL TARTRATE 25 MG: 25 TABLET ORAL at 21:38

## 2018-02-05 RX ADMIN — FLUTICASONE PROPIONATE 1 SPRAY: 50 SPRAY, METERED NASAL at 08:41

## 2018-02-05 RX ADMIN — BUDESONIDE AND FORMOTEROL FUMARATE DIHYDRATE 2 PUFF: 160; 4.5 AEROSOL RESPIRATORY (INHALATION) at 08:41

## 2018-02-05 RX ADMIN — INSULIN LISPRO 1 UNITS: 100 INJECTION, SOLUTION INTRAVENOUS; SUBCUTANEOUS at 21:38

## 2018-02-05 RX ADMIN — INSULIN HUMAN 45 UNITS: 500 INJECTION, SOLUTION SUBCUTANEOUS at 08:49

## 2018-02-05 RX ADMIN — ISODIUM CHLORIDE 3 ML: 0.03 SOLUTION RESPIRATORY (INHALATION) at 07:27

## 2018-02-05 RX ADMIN — INSULIN LISPRO 1 UNITS: 100 INJECTION, SOLUTION INTRAVENOUS; SUBCUTANEOUS at 08:42

## 2018-02-05 RX ADMIN — ACETAMINOPHEN 650 MG: 325 TABLET, FILM COATED ORAL at 15:12

## 2018-02-05 RX ADMIN — HEPARIN SODIUM 5000 UNITS: 5000 INJECTION, SOLUTION INTRAVENOUS; SUBCUTANEOUS at 13:23

## 2018-02-05 RX ADMIN — INSULIN HUMAN 45 UNITS: 500 INJECTION, SOLUTION SUBCUTANEOUS at 11:18

## 2018-02-05 RX ADMIN — Medication 325 MG: at 08:38

## 2018-02-05 RX ADMIN — WATER: 1 INJECTION INTRAMUSCULAR; INTRAVENOUS; SUBCUTANEOUS at 21:40

## 2018-02-05 RX ADMIN — ACETAZOLAMIDE 250 MG: 500 INJECTION, POWDER, LYOPHILIZED, FOR SOLUTION INTRAVENOUS at 11:13

## 2018-02-05 RX ADMIN — INSULIN LISPRO 2 UNITS: 100 INJECTION, SOLUTION INTRAVENOUS; SUBCUTANEOUS at 11:19

## 2018-02-05 RX ADMIN — ISODIUM CHLORIDE 3 ML: 0.03 SOLUTION RESPIRATORY (INHALATION) at 13:20

## 2018-02-05 RX ADMIN — LEVALBUTEROL HYDROCHLORIDE 1.25 MG: 1.25 SOLUTION, CONCENTRATE RESPIRATORY (INHALATION) at 07:27

## 2018-02-05 RX ADMIN — HEPARIN SODIUM 5000 UNITS: 5000 INJECTION, SOLUTION INTRAVENOUS; SUBCUTANEOUS at 21:39

## 2018-02-05 RX ADMIN — METOPROLOL TARTRATE 25 MG: 25 TABLET ORAL at 08:46

## 2018-02-05 RX ADMIN — GABAPENTIN 300 MG: 300 CAPSULE ORAL at 21:38

## 2018-02-05 RX ADMIN — PRAVASTATIN SODIUM 40 MG: 40 TABLET ORAL at 16:55

## 2018-02-05 RX ADMIN — ASPIRIN 81 MG: 81 TABLET, COATED ORAL at 08:38

## 2018-02-05 RX ADMIN — CYCLOBENZAPRINE HYDROCHLORIDE 10 MG: 10 TABLET, FILM COATED ORAL at 19:58

## 2018-02-05 RX ADMIN — LEVALBUTEROL HYDROCHLORIDE 1.25 MG: 1.25 SOLUTION, CONCENTRATE RESPIRATORY (INHALATION) at 13:20

## 2018-02-05 RX ADMIN — PANTOPRAZOLE SODIUM 40 MG: 40 TABLET, DELAYED RELEASE ORAL at 16:55

## 2018-02-05 RX ADMIN — WATER 5 ML: 1 INJECTION INTRAMUSCULAR; INTRAVENOUS; SUBCUTANEOUS at 11:13

## 2018-02-05 RX ADMIN — POTASSIUM CHLORIDE 10 MEQ: 750 TABLET, EXTENDED RELEASE ORAL at 08:38

## 2018-02-05 RX ADMIN — FUROSEMIDE 60 MG: 10 INJECTION, SOLUTION INTRAMUSCULAR; INTRAVENOUS at 11:13

## 2018-02-05 RX ADMIN — LEVALBUTEROL HYDROCHLORIDE 1.25 MG: 1.25 SOLUTION, CONCENTRATE RESPIRATORY (INHALATION) at 19:15

## 2018-02-05 RX ADMIN — INSULIN LISPRO 1 UNITS: 100 INJECTION, SOLUTION INTRAVENOUS; SUBCUTANEOUS at 16:56

## 2018-02-05 NOTE — MEDICAL STUDENT
MEDICAL STUDENT  Inpatient Progress Note for TRAINING ONLY  Not Part of Legal Medical Record       Progress Note - Markel Jones Sr  81 y o  male MRN: [id number]    Unit/Bed#: [facility] 530-01 Encounter: [id number]      Assessment:   82 y/o male PMH significant for CAD with prior MI and 3 stents; COPD on 3L at home; CHF with grade 1 diastolic dysfunction as of 9/2017, T2DM on insulin (most recent A1C 6 8 ); HTN, and hyperlipidemia who presented in an acute exacerbation of his heart failure  Plan:  Acute Exacerbation of CHF Grade 1 Diastolic Dysfunction  ? BNP 1,580  Troponins ~0 2 --> likely Type 2 NSTEMI, with EKG not suggestive of active ischemia, but evidence for RVH and RBBB  ? CXR demonstrated some vascular congestion with R-sided effusions  ? Most recent ECHO was 9/2017 with EF of 65%  Currently receiving 40mg BID IV LASIX  Creatinine @ 1 44  Diuresed 4 7L total yesterday  ? Strict I/Os  Fluid restriction  ? Cardiology consult in place  Appreciate input  ? Continue IV diuresis      Acute Kidney Injury on CKD3 - Cardiorenal Syndrome  ? Patient's Creatinine currently @ 1 44  Baseline appears to be between 1 1 and 1 2    ? His current elevation is likely 2/2 to his volume overload decreasing adequate perfusion to his kidneys  We will maintain gentle diuresis at 40mg BID and monitor      Type 2 Diabetes Mellitus  ? Most recent A1C was 6 8   ? Patient on insulin at home, we will continue with glucose checks prior to meals and insulin regiment   ? Wound care consult in place  ? Patient to wear compression stockings during the day      COPD w/o Acute Exacerbation  ? Patient's SOB likely 2/2 more to his cardiac condition as opposed to a COPD exacerbation  Denies any new cough or worsening sputum production  ? Will continue respiratory treatment and nebulizers as per home regiment  ? On 3L NC at home for dyspnea with minimal exertion  Currently doing well on 3L     Disposition  ?  Patient will remain inpatient until adequate diuresis is achieved, and he has been evaluated by cardiology  ? [facility] for tomorrow morning to monitor creatinine       Subjective:   I'm doing OK    Objective:     Vitals: Blood pressure 140/79, pulse 89, temperature 98 2 °F (36 8 °C), temperature source Oral, resp  rate 20, height 6' (1 829 m), weight 125 kg (275 lb 12 8 oz), SpO2 92 %  ,Body mass index is 37 41 kg/m²  Intake/Output Summary (Last 24 hours) at 02/05/18 5380  Last data filed at 02/05/18 0700   Gross per 24 hour   Intake             1320 ml   Output             4725 ml   Net            -3405 ml       Physical Exam: /79   Pulse 89   Temp 98 2 °F (36 8 °C) (Oral)   Resp 20   Ht 6' (1 829 m)   Wt 125 kg (275 lb 12 8 oz)   SpO2 92%   BMI 37 41 kg/m²   General appearance: alert and oriented, in no acute distress and fatigued  Eyes: no scleral icterus or pallor   Throat: no cervical adenopathy  Lungs: Minimal breath sounds throughout entire lung fields  No crackles appreciated at this time  Otherwise clear  Heart: regular rate and rhythm, S1, S2 normal, no murmur, click, rub or gallop  Abdomen: soft, non-tender; bowel sounds normal; no masses,  no organomegaly  Extremities: LE with evidence of chronic venous stasis dermatitis - wound weeping improved    Invasive Devices     Peripheral Intravenous Line            Peripheral IV 02/05/18 Left Forearm less than 1 day                Lab, Imaging and other studies: I have personally reviewed pertinent reports      VTE Pharmacologic Prophylaxis: Heparin  VTE Mechanical Prophylaxis: sequential compression device

## 2018-02-05 NOTE — ASSESSMENT & PLAN NOTE
· Chronic hyperglcemia  · humulin R to 45 units with breakfast and lunch and 25 units with dinner, ISS  · Continue blood glucose monitoring

## 2018-02-05 NOTE — ASSESSMENT & PLAN NOTE
· Stable  · Continue symbicort, spiriva, nebs  · Pt is on home o2= 2-3 liters NC  · Quit smoking 6 mos ago  · Follows outpt with Dr Ruby Kothari

## 2018-02-05 NOTE — PROGRESS NOTES
Progress Note - Elana Osborne Sr  1945, 81 y o  male MRN: [id number]    Unit/Bed#: [facility] 530-01 Encounter: [id number]    Primary Care Provider: Nina Mata MD   Date and time admitted to hospital: 2/1/2018  1:22 PM        * Acute on chronic diastolic congestive heart failure ([facility] )   Assessment & Plan    · noncompliant with Lasix prior to coming in   · 2kg change in weight since admission  · C/w lasix 40mg IV BID, additional Lasix 60mg IV once  · Cardiology following  · Monitor input output/daily weights  · Oxygen supplementation          Poor dentition   Assessment & Plan    · Poor oral hygiene  · Outpatient f/u with dentist after discharge         Non-ST elevation myocardial infarction (NSTEMI), type 2 ([place] [address] )   Assessment & Plan    · History of CAD status post PCI with stents in RCA and diagonal 1 In the setting of heart failure on this admission  · Cardiology following, no further w/u        Chronic kidney disease, stage 3   Assessment & Plan    · Creat baseline appears to be ~1 2-1 4  · BUN/Cr- 18/1 44  · Continue IV diuretics  · Avoid any further nephrotoxic drugs or hypotension        Bilateral lower extremity edema   Assessment & Plan    · Legs continue to swollen due to fluid overloaded state  · Chronic venous stasis  · wound care  · Compression bandages, compression stockings  · Elevate legs          COPD (chronic obstructive pulmonary disease) ([facility])   Assessment & Plan    · Stable  · Continue symbicort, spiriva, nebs  · Pt is on home o2= 2-3 liters NC  · Quit smoking 6 mos ago  · Follows outpt with Dr Haskins Patch        Chronic respiratory failure with hypoxia ([facility] 75 )   Assessment & Plan    Secondary to COPD/CHF  Stable  Continue oxygen supplementation, bronchodilation        Venous stasis dermatitis of both lower extremities   Assessment & Plan    Continue venous compression stockings, keep legs clean and dry bilaterally        Type 2 diabetes mellitus with complication [facility])   Assessment & Plan · Chronic hyperglcemia  · humulin R to 45 units with breakfast and lunch and 25 units with dinner, ISS  · Continue blood glucose monitoring        Dyslipidemia   Assessment & Plan    Stable  Continue statin        Obesity (BMI 30-39  9)   Assessment & Plan    · Morbid obesity- BMI 39  · Lifestyle modification  · diet exercise and weight loss            VTE Pharmacologic Prophylaxis:   Pharmacologic: Heparin  Mechanical VTE Prophylaxis in Place: No, compression stockings present    Patient Centered Rounds: I have performed bedside rounds with nursing staff today  Discussions with Specialists or Other Care Team Provider:  Yes    Education and Discussions with Family / Patient:  Yes  Time Spent for Care: 45 minutes  More than 50% of total time spent on counseling and coordination of care as described above  Current Length of Stay: 4 day(s)    Current Patient Status: Inpatient   Certification Statement: The patient will continue to require additional inpatient hospital stay due to Medical management of acute Congestive heart failure exacerbation    Discharge Plan:  After medically stable from the acute Congestive heart failure, 48h  Code Status: Level 1 - Full Code      Subjective:   Continues to report to lower extremity swelling, not improving  Review of systems negative for fever, shortness of breath, chest tightness  Review of systems negative otherwise    Objective:     Vitals:   Temp (24hrs), Av 2 °F (36 8 °C), Min:97 9 °F (36 6 °C), Max:98 5 °F (36 9 °C)    HR:  [70-89] 89  Resp:  [18-21] 20  BP: (119-158)/(57-79) 140/79  SpO2:  [92 %-96 %] 92 %  Body mass index is 37 41 kg/m²  Input and Output Summary (last 24 hours):        Intake/Output Summary (Last 24 hours) at 18 0848  Last data filed at 18 0700   Gross per 24 hour   Intake             1320 ml   Output             4725 ml   Net            -3405 ml       Physical Exam:     Physical Exam   Constitutional: He is oriented to person, place, and time  No distress  HENT:   Mouth/Throat: Oropharynx is clear and moist    Eyes: EOM are normal  Pupils are equal, round, and reactive to light  Neck: No JVD present  Cardiovascular: Normal rate and normal heart sounds  Pulmonary/Chest: Effort normal  No respiratory distress  He has no wheezes  Diminished breath sounds bilaterally   Abdominal: Soft  Bowel sounds are normal  He exhibits no distension  There is no tenderness  Obese abdomen unable to appreciate organomegaly   Musculoskeletal: Normal range of motion  He exhibits edema  Bilateral lower extremity edema 4+ pitting, erythema bilaterally   Neurological: He is alert and oriented to person, place, and time  Skin: Skin is warm  There is erythema  Psychiatric: He has a normal mood and affect  His behavior is normal  Judgment and thought content normal          Additional Data:     Labs:      Results from last 7 days  Lab Units 02/05/18  0451   WBC Thousand/uL 11 46*   HEMOGLOBIN g/dL 13 2   HEMATOCRIT % 44 1   PLATELETS Thousands/uL 173   NEUTROS PCT % 75   LYMPHS PCT % 14   MONOS PCT % 9   EOS PCT % 2       Results from last 7 days  Lab Units 02/05/18  0451   SODIUM mmol/L 133*   POTASSIUM mmol/L 4 4   CHLORIDE mmol/L 92*   CO2 mmol/L 38*   BUN mg/dL 18   CREATININE mg/dL 1 44*   CALCIUM mg/dL 8 9   TOTAL PROTEIN g/dL 8 3*   BILIRUBIN TOTAL mg/dL 0 59   ALK PHOS U/L 72   ALT U/L 37   AST U/L 57*   GLUCOSE RANDOM mg/dL 116           * I Have Reviewed All Lab Data Listed Above  * Additional Pertinent Lab Tests Reviewed:  María 68 Admission Reviewed    Imaging:  Reviewed    Recent Cultures (last 7 days):           Last 24 Hours Medication List:     Current Facility-Administered Medications:  acetaminophen 650 mg Oral Q4H PRN Sharad Cunningham MD   acetaZOLAMIDE 250 mg Intravenous Q12H [address] Betty Luna DO   albuterol 2 5 mg Nebulization Q4H PRN Sharad Cunningham MD   aspirin 81 mg Oral Daily Sharad Cunningham MD benzocaine  Mucosal 4x Daily PRN YOLANDE Morris   budesonide-formoterol 2 puff Inhalation BID Caprice Parker MD   cyclobenzaprine 10 mg Oral HS Rober Meeks MD   ferrous sulfate 325 mg Oral Daily With Breakfast Caprice Parker MD   fluticasone 1 spray Each Nare Daily Ana Grant MD   furosemide 40 mg Intravenous BID Caprice Parker MD   furosemide 60 mg Intravenous Once Ana Grant MD   gabapentin 300 mg Oral HS Caprice Parker MD   heparin (porcine) 5,000 Units Subcutaneous Q8H [address] MD Jeanna   insulin lispro 1-5 Units Subcutaneous TID Anne Salt MD Jeanna   insulin lispro 1-5 Units Subcutaneous HS Rober Meeks MD   insulin regular 25 Units Subcutaneous Before Dinner YOLANDE Bernabe   insulin regular 45 Units Subcutaneous BID before breakfast/lunch YOLANDE Bonilla   levalbuterol 1 25 mg Nebulization TID Ana Grant MD   metoprolol tartrate 25 mg Oral Q12H [address] Shawna Briand LEATHA Meeks MD   oxyCODONE 5 mg Oral Q4H PRN Caprice Parker MD   oxyCODONE 10 mg Oral Q4H PRN Caprice Parker MD   pantoprazole 40 mg Oral BID AC Rober Meeks MD   potassium chloride 10 mEq Oral Daily Caprice Parker MD   pravastatin 40 mg Oral Daily With Dayan Husain MD   sodium chloride 3 mL Nebulization TID Ana Grant MD   tiotropium 18 mcg Inhalation Daily Caprice Parker MD        Today, Patient Was Seen By: Ana Grant MD    ** Please Note: Dictation voice to text software may have been used in the creation of this document   **

## 2018-02-05 NOTE — PROGRESS NOTES
Cardiology Progress Note - Jah Zambrano Sr  81 y o  male MRN: [id number]    Unit/Bed#: [facility] 530-01 Encounter: [id number]      Assessment:  Principal Problem:    Acute on chronic diastolic congestive heart failure ([facility] )  Active Problems:    Obesity (BMI 30-39  9)    Dyslipidemia    Type 2 diabetes mellitus with complication (HCC)    Venous stasis dermatitis of both lower extremities    Chronic respiratory failure with hypoxia (HCC)    COPD (chronic obstructive pulmonary disease) ([facility])    Bilateral lower extremity edema    Chronic kidney disease, stage 3    Non-ST elevation myocardial infarction (NSTEMI), type 2 ([address] )    Poor dentition      Plan:  Patient is comfortable this morning  He had no issues overnight  His telemetry is stable demonstrating sinus rhythm  He continues on intravenous diuresis  His potassium this morning is 4 4  His BUN and creatinine are stable  We will continue his present medical regimen  Subjective:   Patient seen and examined  No significant events overnight   negative  Objective:     Vitals: Blood pressure 140/79, pulse 89, temperature 98 2 °F (36 8 °C), temperature source Oral, resp  rate 20, height 6' (1 829 m), weight 125 kg (275 lb 12 8 oz), SpO2 92 %  , Body mass index is 37 41 kg/m² , Orthostatic Blood Pressures    Flowsheet Row Most Recent Value   Blood Pressure  140/79 filed at 02/05/2018 3719   Patient Position - Orthostatic VS  Sitting filed at 02/03/2018 0829      ,      Intake/Output Summary (Last 24 hours) at 02/05/18 0746  Last data filed at 02/05/18 0700   Gross per 24 hour   Intake             1320 ml   Output             4725 ml   Net            -3405 ml       No significant arrhythmias seen on telemetry review  Physical Exam:    GEN: Jah Zambrano Sr     NECK: supple, no carotid bruits, no JVD or HJR  HEART: normal rate, regular rhythm, normal S1 and S2, no murmurs, clicks, gallops or rubs   LUNGS: clear to auscultation bilaterally; no wheezes, rales, or rhonchi   ABDOMEN: normal bowel sounds, soft, no tenderness, no distention  EXTREMITIES:edema  SKIN: warm and well perfused, no suspicious lesions on exposed skin    Labs & Results:    Admission on 02/01/2018   Component Date Value    WBC 02/01/2018 11 69*    RBC 02/01/2018 5 24     Hemoglobin 02/01/2018 13 0     Hematocrit 02/01/2018 43 8     MCV 02/01/2018 84     MCH 02/01/2018 24 8*    MCHC 02/01/2018 29 7*    RDW 02/01/2018 17 2*    MPV 02/01/2018 9 7     Platelets 47/84/5782 172     nRBC 02/01/2018 0     Neutrophils Relative 02/01/2018 86*    Lymphocytes Relative 02/01/2018 7*    Monocytes Relative 02/01/2018 7     Eosinophils Relative 02/01/2018 0     Basophils Relative 02/01/2018 0     Neutrophils Absolute 02/01/2018 10 07*    Lymphocytes Absolute 02/01/2018 0 76     Monocytes Absolute 02/01/2018 0 77     Eosinophils Absolute 02/01/2018 0 03     Basophils Absolute 02/01/2018 0 01     Sodium 02/01/2018 140     Potassium 02/01/2018 4 7     Chloride 02/01/2018 100     CO2 02/01/2018 37*    Anion Gap 02/01/2018 3*    BUN 02/01/2018 19     Creatinine 02/01/2018 1 46*    Glucose 02/01/2018 95     Calcium 02/01/2018 8 2*    AST 02/01/2018 55*    ALT 02/01/2018 46     Alkaline Phosphatase 02/01/2018 72     Total Protein 02/01/2018 7 8     Albumin 02/01/2018 3 1*    Total Bilirubin 02/01/2018 0 27     eGFR 02/01/2018 48     NT-proBNP 02/01/2018 1580*    Troponin I 02/01/2018 0 12*    Troponin I 02/01/2018 0 27*    Platelets 97/63/0656 164     MPV 02/01/2018 9 2     Troponin I 02/01/2018 0 25*    Sodium 02/01/2018 137     Potassium 02/01/2018 4 2     Chloride 02/01/2018 96*    CO2 02/01/2018 41*    Anion Gap 02/01/2018 0*    BUN 02/01/2018 18     Creatinine 02/01/2018 1 43*    Glucose 02/01/2018 123     Calcium 02/01/2018 8 3     eGFR 02/01/2018 49     Magnesium 02/01/2018 2 3     POC Glucose 02/01/2018 123     Troponin I 02/02/2018 0 20*    WBC 02/02/2018 9 62     RBC 02/02/2018 4 76     Hemoglobin 02/02/2018 11 6*    Hematocrit 02/02/2018 40 6     MCV 02/02/2018 85     MCH 02/02/2018 24 4*    MCHC 02/02/2018 28 6*    RDW 02/02/2018 17 0*    Platelets 05/41/0819 145*    MPV 02/02/2018 9 5     POC Glucose 02/02/2018 316*    Ventricular Rate 02/01/2018 91     Atrial Rate 02/01/2018 91     DC Interval 02/01/2018 148     QRSD Interval 02/01/2018 120     QT Interval 02/01/2018 384     QTC Interval 02/01/2018 472     P Axis 02/01/2018 102     QRS Axis 02/01/2018 130     T Wave Axis 02/01/2018 20     POC Glucose 02/02/2018 213*    POC Glucose 02/02/2018 107     POC Glucose 02/02/2018 114     WBC 02/03/2018 9 00     RBC 02/03/2018 4 84     Hemoglobin 02/03/2018 11 8*    Hematocrit 02/03/2018 40 8     MCV 02/03/2018 84     MCH 02/03/2018 24 4*    MCHC 02/03/2018 28 9*    RDW 02/03/2018 16 7*    MPV 02/03/2018 9 2     Platelets 60/17/3255 151     nRBC 02/03/2018 0     Sodium 02/03/2018 140     Potassium 02/03/2018 4 3     Chloride 02/03/2018 96*    CO2 02/03/2018 43*    Anion Gap 02/03/2018 1*    BUN 02/03/2018 19     Creatinine 02/03/2018 1 27     Glucose 02/03/2018 41*    Calcium 02/03/2018 8 1*    AST 02/03/2018 69*    ALT 02/03/2018 35     Alkaline Phosphatase 02/03/2018 63     Total Protein 02/03/2018 7 3     Albumin 02/03/2018 2 6*    Total Bilirubin 02/03/2018 0 49     eGFR 02/03/2018 57     Segmented % 02/03/2018 79*    Lymphocytes % 02/03/2018 11*    Monocytes % 02/03/2018 9     Eosinophils % 02/03/2018 1     Basophils % 02/03/2018 0     Absolute Neutrophils 02/03/2018 7 11     Lymphocytes Absolute 02/03/2018 0 99     Monocytes Absolute 02/03/2018 0 81     Eosinophils Absolute 02/03/2018 0 09     Basophils Absolute 02/03/2018 0 00     RBC Morphology 02/03/2018 Present     Anisocytosis 02/03/2018 Present     Ovalocytes 02/03/2018 Present     Poikilocytes 02/03/2018 Present     Platelet Estimate 31/75/7584 Adequate     POC Glucose 02/03/2018 67     POC Glucose 02/03/2018 158*    POC Glucose 02/03/2018 251*    POC Glucose 02/03/2018 301*    POC Glucose 02/03/2018 264*    POC Glucose 02/03/2018 310*    Sodium 02/04/2018 134*    Potassium 02/04/2018 4 4     Chloride 02/04/2018 89*    CO2 02/04/2018 43*    Anion Gap 02/04/2018 2*    BUN 02/04/2018 17     Creatinine 02/04/2018 1 40*    Glucose 02/04/2018 191*    Calcium 02/04/2018 8 5     eGFR 02/04/2018 51     WBC 02/04/2018 8 49     RBC 02/04/2018 5 09     Hemoglobin 02/04/2018 12 3     Hematocrit 02/04/2018 42 3     MCV 02/04/2018 83     MCH 02/04/2018 24 2*    MCHC 02/04/2018 29 1*    RDW 02/04/2018 16 4*    Platelets 45/89/7246 148*    MPV 02/04/2018 9 6     POC Glucose 02/04/2018 208*    POC Glucose 02/04/2018 258*    POC Glucose 02/04/2018 155*    POC Glucose 02/04/2018 161*    WBC 02/05/2018 11 46*    RBC 02/05/2018 5 32     Hemoglobin 02/05/2018 13 2     Hematocrit 02/05/2018 44 1     MCV 02/05/2018 83     MCH 02/05/2018 24 8*    MCHC 02/05/2018 29 9*    RDW 02/05/2018 16 3*    MPV 02/05/2018 10 1     Platelets 16/20/4361 173     nRBC 02/05/2018 0     Neutrophils Relative 02/05/2018 75     Lymphocytes Relative 02/05/2018 14     Monocytes Relative 02/05/2018 9     Eosinophils Relative 02/05/2018 2     Basophils Relative 02/05/2018 0     Neutrophils Absolute 02/05/2018 8 48*    Lymphocytes Absolute 02/05/2018 1 64     Monocytes Absolute 02/05/2018 1 05     Eosinophils Absolute 02/05/2018 0 22     Basophils Absolute 02/05/2018 0 02     Sodium 02/05/2018 133*    Potassium 02/05/2018 4 4     Chloride 02/05/2018 92*    CO2 02/05/2018 38*    Anion Gap 02/05/2018 3*    BUN 02/05/2018 18     Creatinine 02/05/2018 1 44*    Glucose 02/05/2018 116     Calcium 02/05/2018 8 9     AST 02/05/2018 57*    ALT 02/05/2018 37     Alkaline Phosphatase 02/05/2018 72     Total Protein 02/05/2018 8 3*    Albumin 02/05/2018 3 1*    Total Bilirubin 02/05/2018 0 59     eGFR 02/05/2018 49     POC Glucose 02/05/2018 161*       Xr Chest 2 Views    Result Date: 2/1/2018  Narrative: CHEST INDICATION:  CHF, shortness of breath, crackles, diabetic patient with lower extremity wounds  COMPARISON:  10/31/2017 VIEWS:  Frontal and lateral projections IMAGES:  5 FINDINGS:     There is mild cardiomegaly  There is moderate right pleural effusion, increased compared with prior  There is some consolidation at the right lung base which is probably atelectasis or pneumonia  Unilateral edema less likely but not excluded  There seems to be some mediastinal shift towards the right which would indicate that there is probably significant volume loss in the right lower lobe  No pneumothorax  Osseous structures intact  Impression: Moderate right pleural effusion and consolidation of the right base which is probably mostly atelectasis  Pneumonia not entirely excluded  Cardiomegaly Rightward mediastinal shift Recommend continued follow-up Workstation performed: [id number]       EKG personally reviewed by Cassie Tuttle MD      Counseling / Coordination of Care  Total floor / unit time spent today 30 minutes  Greater than 50% of total time was spent with the patient and / or family counseling and / or coordination of care

## 2018-02-05 NOTE — CONSULTS
Progress Note - Wound   Nancy Mishra Sr  81 y o  male MRN: [id number]  Unit/Bed#: [facility] 530-01 Encounter: [id number]      Assessment:   Patient is a 81year old male, awake, alert, oriented presenting with complaints of SOB, LE edema and weight gain for several days  He is admitted with CHF and present on admission wounds to LE  On assessment noted all wounds to LLE already dried with scabs in place  RLE with three venous stasis wound, posterior RLE wound already scabbed and non weaping, anterior proximal wound drying and distal wound weeping  Wound measures as documented below, both are very superficial, distal wound with 100% red and proximal wound bed is dried with red and yellow at base  Patient is ambulatory and continent with no other noted issues  Skin cleanse and wound care completed  Plan:   1-Moisturize b/l LE and heels with hydraguard BID and PRN  2-Cleanse RLE wounds with NSS, dermagran to proximal and maxorb to distal wounds, DSD and savage qod  3-Encourage turn/reposition q2h  4-Elevate heels to offload pressure and for edema relief  5-Soft care cushion when out of bed  6-Apply gio stockin to b/l LE in AM remove at HS  Vitals: Blood pressure 142/67, pulse 83, temperature 97 5 °F (36 4 °C), temperature source Oral, resp  rate 20, height 6' (1 829 m), weight 125 kg (275 lb 12 8 oz), SpO2 99 %  ,Body mass index is 37 41 kg/m²        Other Ulcers 02/05/18 Leg Right;Proximal      R proximal LE venous stasis wound (Active)   Wound Description Pink;Yellow 2/5/2018  1:20 PM   Karina-wound Assessment Intact 2/5/2018  1:20 PM   Wound Length (cm) 2 1 cm 2/5/2018  1:20 PM   Wound Width (cm) 2 1 cm 2/5/2018  1:20 PM   Wound Depth (cm) 0 1 2/5/2018  1:20 PM   Calculated Wound Volume (cm^3) 0 44 cm^3 2/5/2018  1:20 PM   Drainage Amount None 2/5/2018  1:20 PM   Patient Tolerance Tolerated well 2/5/2018  1:20 PM       Other Ulcers 02/05/18 Leg Right;Distal      R distal LE venous stasis wounnd (Active) Wound Description Beefy red 2/5/2018  1:20 PM   Karina-wound Assessment Intact 2/5/2018  1:20 PM   Wound Length (cm) 2 1 cm 2/5/2018  1:20 PM   Wound Width (cm) 2 cm 2/5/2018  1:20 PM   Wound Depth (cm) 0 1 2/5/2018  1:20 PM   Calculated Wound Volume (cm^3) 0 42 cm^3 2/5/2018  1:20 PM   Drainage Amount Small 2/5/2018  1:20 PM   Drainage Description Serous 2/5/2018  1:20 PM   Dressings Calcium Alginate 2/5/2018  1:20 PM   Patient Tolerance Tolerated well 2/5/2018  1:20 PM       Our recommendations are placed as orders, please call wound care with questions or concerns  Wound care to continue following weekly      Minor Cathy RN, BSN, Mike [facility] Анна

## 2018-02-05 NOTE — RESPIRATORY THERAPY NOTE
RT Protocol Note  Nancy Mishra   81 y o  male MRN: [id number]  Unit/Bed#: [facility] 530-01 Encounter: [id number]    Assessment    Principal Problem:    Acute on chronic diastolic congestive heart failure ([facility] )  Active Problems:    Obesity (BMI 30-39  9)    Dyslipidemia    Type 2 diabetes mellitus with complication (HCC)    Venous stasis dermatitis of both lower extremities    Chronic respiratory failure with hypoxia (HCC)    COPD (chronic obstructive pulmonary disease) ([facility])    Bilateral lower extremity edema    Chronic kidney disease, stage 3    Non-ST elevation myocardial infarction (NSTEMI), type 2 (HCC)    Poor dentition      Home Pulmonary Medications:  Albuterol udn TID    Past Medical History:   Diagnosis Date    Abdominal pain     Cardiac disease     COPD (chronic obstructive pulmonary disease) ([place] [address] )     Coronary artery disease     Diabetes mellitus ([place] [address] )     Hyperlipidemia     Hypertension     MI (myocardial infarction)     with 3 stents    Prostate cancer ([place] [address] )      Social History     Social History    Marital status: /Civil Union     Spouse name: N/A    Number of children: N/A    Years of education: N/A     Social History Main Topics    Smoking status: Former Smoker     Packs/day: 1 50     Years: 50 00     Quit date: 9/13/2017    Smokeless tobacco: Never Used    Alcohol use No    Drug use: No    Sexual activity: Not Asked     Other Topics Concern    None     Social History Narrative    None       Subjective    Subjective Data: no complaints at this time  Objective    Physical Exam:   Assessment Type: Assess only  General Appearance: Alert, Awake  Respiratory Pattern: Normal  Chest Assessment: Chest expansion symmetrical  Bilateral Breath Sounds: Diminished  Cough: Non-productive  O2 Device: nasal cannula    Vitals:  Blood pressure 151/70, pulse 87, temperature 98 3 °F (36 8 °C), temperature source Oral, resp   rate 20, height 6' (1 829 m), weight 128 kg (281 lb 12 oz), SpO2 92 %           Imaging and other studies: I have personally reviewed pertinent reports  O2 Device: nasal cannula     Plan    Respiratory Plan: Home Bronchodilator Patient pathway        Resp Comments: Pt  requested Tx  at this time  Pt  states he takes albuterol udns TID at home    Will order xopenex udns TID per pt  request

## 2018-02-05 NOTE — ASSESSMENT & PLAN NOTE
· Creat baseline appears to be ~1 2-1 4  · BUN/Cr- 18/1 44  · Continue IV diuretics  · Avoid any further nephrotoxic drugs or hypotension

## 2018-02-05 NOTE — ASSESSMENT & PLAN NOTE
· noncompliant with Lasix prior to coming in   · 2kg change in weight since admission  · C/w lasix 40mg IV BID, additional Lasix 60mg IV once  · Cardiology following  · Monitor input output/daily weights  · Oxygen supplementation

## 2018-02-05 NOTE — CASE MANAGEMENT
Thank you,  [address] [facility] in the [facility] by Onofre Carroll for 2017  Network Utilization Review Department  Phone: [phone]; Fax [phone]  ATTENTION: The Network Utilization Review Department is now centralized for our 7 Facilities  Make a note that we have a new phone and fax numbers for our Department  Please call with any questions or concerns to [phone] and carefully follow the prompts so that you are directed to the right person  All voicemails are confidential  Fax any determinations, approvals, denials, and requests for initial or continue stay review clinical to [phone]  Due to HIGH CALL volume, it would be easier if you could please send faxed requests to expedite your requests and in part, help us provide discharge notifications faster            Continued Stay Review     Date: 18 ACUTE MED SURG LEVEL OF CARE      Vital Signs: /86   Pulse 90   Temp 98 6 °F (37 °C)   Resp 18   Ht 6' (1 829 m)   Wt 125 kg (275 lb 12 8 oz)   SpO2 90%   BMI 37 41 kg/m²      Vitals:   Temp (24hrs), Av 2 °F (36 8 °C), Min:97 9 °F (36 6 °C), Max:98 5 °F (36 9 °C)   HR:  [70-89] 89  Resp:  [18-21] 20  BP: (119-158)/(57-79) 140/79  SpO2:  [92 %-96 %] 92 %  Body mass index is 37 41 kg/m²       Input and Output Summary (last 24 hours):   Last data filed at 18 0700    Gross per 24 hour   Intake             1320 ml   Output             4725 ml   Net            -3405 ml     Diet Mani/CHO Controlled; Consistent Carbohydrate Diet Level 2 (5 carb servings/75 grams CHO/meal);  Sodium 2 Gm        IV ACCESS    Medications:   Scheduled Meds:   Current Facility-Administered Medications:  acetaminophen 650 mg Oral Q4H PRN Stone Osullivan MD   acetaZOLAMIDE 250 mg Intravenous Q12H [facility] Roby Vasquez, DO   albuterol 2 5 mg Nebulization Q4H PRN Stone Osullivan MD   aspirin 81 mg Oral Daily Stone Osullivan MD   benzocaine  Mucosal 4x Daily PRN ClotYOLANDE Taylor   budesonide-formoterol 2 puff Inhalation BID Marybeth Quiles MD   cyclobenzaprine 10 mg Oral HS Marybeth Quiles MD   ferrous sulfate 325 mg Oral Daily With Breakfast Marybeth Quiles MD   fluticasone 1 spray Each Nare Daily Rajat Jimenez MD   furosemide 40 mg Intravenous BID Marybeth Quiles MD   gabapentin 300 mg Oral HS Marybeth Quiles MD   heparin (porcine) 5,000 Units Subcutaneous Q[facility]H [facility]anna   insulin lispro 1-5 Units Subcutaneous TID AC Vaishnavi Meeks MD   insulin lispro 1-5 Units Subcutaneous HS Marybeth Quiles MD   insulin regular 25 Units Subcutaneous Before Dinner ShaniaYOLANDE Josue   insulin regular 45 Units Subcutaneous BID before breakfast/lunch YOLANDE Kim   levalbuterol 1 25 mg Nebulization TID Rajat Jimenez MD   metoprolol tartrate 25 mg Oral Q12H [facility] Marybeth Quiles MD   oxyCODONE 5 mg Oral Q4H PRN Marybeth Quiles MD   oxyCODONE 10 mg Oral Q4H PRN Marybeth Quiles MD   pantoprazole 40 mg Oral BID AC Vaishnavi Meeks MD   potassium chloride 10 mEq Oral Daily Marybeth Quiles MD   pravastatin 40 mg Oral Daily With Jovanny Childress MD   sodium chloride 3 mL Nebulization TID Rajat Jimenez MD   tiotropium 18 mcg Inhalation Daily Marybeth Quiles MD       PRN Meds:     acetaminophen    NEB Tx with albuterol 2 5 mg q4hrs prn given x 1/ 24 hrs    benzocaine    oxyCODONE 5 mg q4hrs prn given x 1/ 24 hrs    oxyCODONE 10 mg q4hrs prn given x 1/ 24 hrs      LABS/Diagnostic Results:  Results from last 7 days  Lab Units 02/05/18  0451   WBC Thousand/uL 11 46*   HEMOGLOBIN g/dL 13 2   HEMATOCRIT % 44 1   PLATELETS Thousands/uL 173   NEUTROS PCT % 75   LYMPHS PCT % 14   MONOS PCT % 9   EOS PCT % 2       Results from last 7 days  Lab Units 02/05/18  0451   SODIUM mmol/L 133*   POTASSIUM mmol/L 4 4   CHLORIDE mmol/L 92*   CO2 mmol/L 38*   BUN mg/dL 18   CREATININE mg/dL 1 44*   CALCIUM mg/dL 8 9   TOTAL PROTEIN g/dL 8 3*   BILIRUBIN TOTAL mg/dL 0 59   ALK PHOS U/L 72   ALT U/L 37   AST U/L 57*   GLUCOSE RANDOM mg/dL 116       Age/Sex: 81 y o  male     Assessment/Plan:        * Acute on chronic diastolic congestive heart failure (HCC)   Assessment & Plan     · noncompliant with Lasix prior to coming in   · 2kg change in weight since admission  · C/w lasix 40mg IV BID, additional Lasix 60mg IV once  · Cardiology following  · Monitor input output/daily weights  · Oxygen supplementation             Poor dentition   Assessment & Plan     · Poor oral hygiene  · Outpatient f/u with dentist after discharge           Non-ST elevation myocardial infarction (NSTEMI), type 2 ([facility] [address] )   Assessment & Plan     · History of CAD status post PCI with stents in RCA and diagonal 1 In the setting of heart failure on this admission  · Cardiology following, no further w/u          Chronic kidney disease, stage 3   Assessment & Plan     · Creat baseline appears to be ~1 2-1 4  · BUN/Cr- 18/1 44  · Continue IV diuretics  · Avoid any further nephrotoxic drugs or hypotension          Bilateral lower extremity edema   Assessment & Plan     · Legs continue to swollen due to fluid overloaded state  · Chronic venous stasis  · wound care  · Compression bandages, compression stockings  · Elevate legs             COPD (chronic obstructive pulmonary disease) (HCC)   Assessment & Plan     · Stable  · Continue symbicort, spiriva, nebs  · Pt is on home o2= 2-3 liters NC  · Quit smoking 6 mos ago  · Follows outpt with Dr Heather Kirby          Chronic respiratory failure with hypoxia (HCC)   Assessment & Plan     Secondary to COPD/CHF  Stable  Continue oxygen supplementation, bronchodilation          Venous stasis dermatitis of both lower extremities   Assessment & Plan     Continue venous compression stockings, keep legs clean and dry bilaterally          Type 2 diabetes mellitus with complication (HCC)   Assessment & Plan     · Chronic hyperglcemia  · humulin R to 45 units with breakfast and lunch and 25 units with dinner, ISS  · Continue blood glucose monitoring          Dyslipidemia   Assessment & Plan     Stable  Continue statin          Obesity (BMI 30-39  9)   Assessment & Plan     · Morbid obesity- BMI 39  · Lifestyle modification  · diet exercise and weight loss              VTE Pharmacologic Prophylaxis:   Pharmacologic: Heparin  Mechanical VTE Prophylaxis in Place: No, compression stockings present      Current Length of Stay: 4 day(s)     Current Patient Status: Inpatient   Certification Statement: The patient will continue to require additional inpatient hospital stay due to Medical management of acute Congestive heart failure exacerbation          Discharge Plan:   [address] [facility] CLEARED    CASE MANAGEMENT FOLLOWING CLOSELY FOR ALL DISCHARGE NEEDS

## 2018-02-05 NOTE — ASSESSMENT & PLAN NOTE
· Legs continue to swollen due to fluid overloaded state  · Chronic venous stasis  · wound care  · Compression bandages, compression stockings  · Elevate legs

## 2018-02-05 NOTE — CASE MANAGEMENT
Thank you,  [address] [facility] in the [facility] by Onofre Carroll for 2017  Network Utilization Review Department  Phone: [phone]; Fax [phone]  ATTENTION: The Network Utilization Review Department is now centralized for our 7 Facilities  Make a note that we have a new phone and fax numbers for our Department  Please call with any questions or concerns to [phone] and carefully follow the prompts so that you are directed to the right person  All voicemails are confidential  Fax any determinations, approvals, denials, and requests for initial or continue stay review clinical to [phone]  Due to HIGH CALL volume, it would be easier if you could please send faxed requests to expedite your requests and in part, help us provide discharge notifications faster  Continued Stay Review    Date: 2/3/18 Saturday ACUTE MED SURG LEVEL OF CARE    Vital Signs:  Temp (24hrs), Av 2 °F (36 8 °C), Min:97 8 °F (36 6 °C), Max:98 8 °F (37 1 °C)   HR:  [62-90] 90  Resp:  [18-20] 18  BP: (120-158)/(59-87) 158/87  SpO2:  [92 %-95 %] 95 %  Body mass index is 38 09 kg/m²       Input and Output Summary (last 24 hours):   Last data filed at 18 0948    Gross per 24 hour   Intake             1240 ml   Output             4975 ml   Net            -3735 ml       Diet Mani/CHO Controlled; Consistent Carbohydrate Diet Level 2 (5 carb servings/75 grams CHO/meal);  Sodium 2 Gm       IV ACCESS       Medications:   Scheduled Meds:   Current Facility-Administered Medications:  acetaminophen 650 mg Oral Q4H PRN Jyothi Roman MD   acetaZOLAMIDE 250 mg Intravenous Q12H [address] Felipe Oswald DO   albuterol 2 5 mg Nebulization Q4H PRN Jyothi Roman MD   aspirin 81 mg Oral Daily Jyothi Roman MD   benzocaine  Mucosal 4x Daily PRN YOLANDE Carl   budesonide-formoterol 2 puff Inhalation BID Jyothi Roman MD   cyclobenzaprine 10 mg Oral HS Jyothi Roman MD   ferrous sulfate 325 mg Oral Daily With Breakfast Donna Adams MD   fluticasone 1 spray Each Nare Daily Chayo Rawls MD   furosemide 40 mg Intravenous BID Donna Adams MD   gabapentin 300 mg Oral HS Donna Adams MD   heparin (porcine) 5,000 Units Subcutaneous Q8H [facility] [place] Donna Adams MD   insulin lispro 1-5 Units Subcutaneous TID Caro Meeks MD   insulin lispro 1-5 Units Subcutaneous HS Donna Adams MD   insulin regular 25 Units Subcutaneous Before Dinner YOLANDE Bernabe   insulin regular 45 Units Subcutaneous BID before breakfast/lunch YOLANDE Pierre   levalbuterol 1 25 mg Nebulization TID Chayo Rawls MD   metoprolol tartrate 25 mg Oral Q12H [facility] Donna Adams MD   oxyCODONE 5 mg Oral Q4H PRN Donna Adams MD   oxyCODONE 10 mg Oral Q4H PRN Donna Adams MD   pantoprazole 40 mg Oral BID AC [facility] MD Jeanna   potassium chloride 10 mEq Oral Daily Donna Adams MD   pravastatin 40 mg Oral Daily With Gretchen Sousa MD   sodium chloride 3 mL Nebulization TID Chayo Rawls MD   tiotropium 18 mcg Inhalation Daily Donna Adams MD       PRN Meds:     acetaminophen    NEB Tx with albuterol 2 5 mg q4hrs prn given x 1/ 24hrs    benzocaine    oxyCODONE 5 mg q4hrs prn given x 1/ 24 hrs    oxyCODONE 10 mg q4hrs prn given x 2/ 24 hrs      LABS/Diagnostic Results:   Results from last 7 days  Lab Units 02/03/18  0502   02/01/18  1400   WBC Thousand/uL 9 00  < > 11 69*   HEMOGLOBIN g/dL 11 8*  < > 13 0   HEMATOCRIT % 40 8  < > 43 8   PLATELETS Thousands/uL 151  < > 172   NEUTROS PCT %  --   --  86*   LYMPHS PCT %  --   --  7*   LYMPHO PCT % 11*  --   --    MONOS PCT %  --   --  7   MONO PCT MAN % 9  --   --    EOS PCT %  --   --  0   EOSINO PCT MANUAL % 1  --   --       Results from last 7 days  Lab Units 02/03/18  0502   SODIUM mmol/L 140   POTASSIUM mmol/L 4 3   CHLORIDE mmol/L 96*   CO2 mmol/L 43*   BUN mg/dL 19   CREATININE mg/dL 1 27   CALCIUM mg/dL 8 1*   TOTAL PROTEIN g/dL 7 3   BILIRUBIN TOTAL mg/dL 0 49   ALK PHOS U/L 63   ALT U/L 35   AST U/L 69*   GLUCOSE RANDOM mg/dL 41*         Age/Sex: 81 y o  male     Assessment/Plan:       * Acute on chronic diastolic congestive heart failure (HCC)   Assessment & Plan     · noncompliant with Lasix prior to coming in   · Pending am wt, pt initially refused and is now willing to have it checked by RN- discussed the importance of checking it daily in the am with patient and he agreed   · C/w lasix 40mg IV BID   · Cardiology following  · Monitor input output             Poor dentition   Assessment & Plan     · Monitor teeth  · Currently no significant signs of infection, low threshold for starting abxs   · Outpatient f/u with dentist after discharge           Non-ST elevation myocardial infarction (NSTEMI), type 2 ([facility] )   Assessment & Plan     · In the setting of heart failure   · Cardiology following, no further w/u          Chronic kidney disease, stage 3   Assessment & Plan     · Creat baseline appears to be ~1 2-1 4  · Creat improved today to 1 27  · Continue IV diuretics  · Follow-up BMP in am  · Avoid any further nephrotoxic drugs or hypotension          Bilateral lower extremity edema   Assessment & Plan     · wound care  · Compression bandages, compression stockings  · Elevate legs             COPD (chronic obstructive pulmonary disease) ([facility])   Assessment & Plan     · Stable  · Continue symbicort, spiriva, nebs  · Pt is on home o2= 2-3 liters NC  · Quit smoking 6 mos ago  · Follows outpt with Dr Liane Lara          Type 2 diabetes mellitus with complication ([facility] )   Assessment & Plan     · (+) hypoglycemia this am    · Decrease humulin R to 45 units with breakfast and lunch and 25 units with dinner  · Continue blood glucose monitor  · Insulin sliding scale as needed           Obesity (BMI 30-39  9)   Assessment & Plan     · Morbid obesity- BMI 39  · Lifestyle modification  · diet exercise and weight loss              VTE Pharmacologic Prophylaxis:   Pharmacologic: Heparin  Mechanical VTE Prophylaxis in Place:  No      Current Length of Stay: 2 day(s)     Current Patient Status: Inpatient   Certification Statement: The patient will continue to require additional inpatient hospital stay due to iv diuretics        Discharge Plan:   [address] CLEARED    CASE MANAGEMENT FOLLOWING CLOSELY FOR ALL DISCHARGE NEEDS

## 2018-02-06 LAB
ANION GAP SERPL CALCULATED.3IONS-SCNC: 8 MMOL/L (ref 4–13)
BUN SERPL-MCNC: 23 MG/DL (ref 5–25)
CALCIUM SERPL-MCNC: 8.6 MG/DL (ref 8.3–10.1)
CHLORIDE SERPL-SCNC: 93 MMOL/L (ref 100–108)
CO2 SERPL-SCNC: 34 MMOL/L (ref 21–32)
CREAT SERPL-MCNC: 1.47 MG/DL (ref 0.6–1.3)
GFR SERPL CREATININE-BSD FRML MDRD: 48 ML/MIN/1.73SQ M
GLUCOSE SERPL-MCNC: 169 MG/DL (ref 65–140)
GLUCOSE SERPL-MCNC: 206 MG/DL (ref 65–140)
GLUCOSE SERPL-MCNC: 223 MG/DL (ref 65–140)
GLUCOSE SERPL-MCNC: 226 MG/DL (ref 65–140)
GLUCOSE SERPL-MCNC: 231 MG/DL (ref 65–140)
POTASSIUM SERPL-SCNC: 3.5 MMOL/L (ref 3.5–5.3)
SODIUM SERPL-SCNC: 135 MMOL/L (ref 136–145)

## 2018-02-06 PROCEDURE — 94760 N-INVAS EAR/PLS OXIMETRY 1: CPT

## 2018-02-06 PROCEDURE — 82948 REAGENT STRIP/BLOOD GLUCOSE: CPT

## 2018-02-06 PROCEDURE — 94640 AIRWAY INHALATION TREATMENT: CPT

## 2018-02-06 PROCEDURE — 99232 SBSQ HOSP IP/OBS MODERATE 35: CPT | Performed by: INTERNAL MEDICINE

## 2018-02-06 PROCEDURE — 80048 BASIC METABOLIC PNL TOTAL CA: CPT | Performed by: HOSPITALIST

## 2018-02-06 RX ORDER — POTASSIUM CHLORIDE 20 MEQ/1
20 TABLET, EXTENDED RELEASE ORAL 2 TIMES DAILY
Status: DISCONTINUED | OUTPATIENT
Start: 2018-02-06 | End: 2018-02-12 | Stop reason: HOSPADM

## 2018-02-06 RX ADMIN — OXYCODONE HYDROCHLORIDE 7.5 MG: 5 TABLET ORAL at 21:13

## 2018-02-06 RX ADMIN — POTASSIUM CHLORIDE 20 MEQ: 1500 TABLET, EXTENDED RELEASE ORAL at 17:02

## 2018-02-06 RX ADMIN — TIOTROPIUM BROMIDE 18 MCG: 18 CAPSULE ORAL; RESPIRATORY (INHALATION) at 09:04

## 2018-02-06 RX ADMIN — FLUTICASONE PROPIONATE 1 SPRAY: 50 SPRAY, METERED NASAL at 09:07

## 2018-02-06 RX ADMIN — ACETAMINOPHEN 325 MG: 325 TABLET, FILM COATED ORAL at 09:08

## 2018-02-06 RX ADMIN — INSULIN LISPRO 2 UNITS: 100 INJECTION, SOLUTION INTRAVENOUS; SUBCUTANEOUS at 09:05

## 2018-02-06 RX ADMIN — PANTOPRAZOLE SODIUM 40 MG: 40 TABLET, DELAYED RELEASE ORAL at 06:14

## 2018-02-06 RX ADMIN — PANTOPRAZOLE SODIUM 40 MG: 40 TABLET, DELAYED RELEASE ORAL at 16:55

## 2018-02-06 RX ADMIN — CYCLOBENZAPRINE HYDROCHLORIDE 10 MG: 10 TABLET, FILM COATED ORAL at 21:13

## 2018-02-06 RX ADMIN — GABAPENTIN 300 MG: 300 CAPSULE ORAL at 09:01

## 2018-02-06 RX ADMIN — INSULIN HUMAN 45 UNITS: 500 INJECTION, SOLUTION SUBCUTANEOUS at 11:56

## 2018-02-06 RX ADMIN — HEPARIN SODIUM 5000 UNITS: 5000 INJECTION, SOLUTION INTRAVENOUS; SUBCUTANEOUS at 13:12

## 2018-02-06 RX ADMIN — ACETAMINOPHEN 325 MG: 325 TABLET, FILM COATED ORAL at 21:12

## 2018-02-06 RX ADMIN — LEVALBUTEROL HYDROCHLORIDE 1.25 MG: 1.25 SOLUTION, CONCENTRATE RESPIRATORY (INHALATION) at 08:05

## 2018-02-06 RX ADMIN — GABAPENTIN 300 MG: 300 CAPSULE ORAL at 21:12

## 2018-02-06 RX ADMIN — GABAPENTIN 300 MG: 300 CAPSULE ORAL at 16:55

## 2018-02-06 RX ADMIN — WATER: 1 INJECTION INTRAMUSCULAR; INTRAVENOUS; SUBCUTANEOUS at 21:16

## 2018-02-06 RX ADMIN — INSULIN LISPRO 1 UNITS: 100 INJECTION, SOLUTION INTRAVENOUS; SUBCUTANEOUS at 16:54

## 2018-02-06 RX ADMIN — ACETAZOLAMIDE 250 MG: 500 INJECTION, POWDER, LYOPHILIZED, FOR SOLUTION INTRAVENOUS at 11:22

## 2018-02-06 RX ADMIN — Medication 325 MG: at 09:01

## 2018-02-06 RX ADMIN — POTASSIUM CHLORIDE 20 MEQ: 1500 TABLET, EXTENDED RELEASE ORAL at 09:01

## 2018-02-06 RX ADMIN — WATER: 1 INJECTION INTRAMUSCULAR; INTRAVENOUS; SUBCUTANEOUS at 11:40

## 2018-02-06 RX ADMIN — PRAVASTATIN SODIUM 40 MG: 40 TABLET ORAL at 16:55

## 2018-02-06 RX ADMIN — LEVALBUTEROL HYDROCHLORIDE 1.25 MG: 1.25 SOLUTION, CONCENTRATE RESPIRATORY (INHALATION) at 13:34

## 2018-02-06 RX ADMIN — ACETAZOLAMIDE 250 MG: 500 INJECTION, POWDER, LYOPHILIZED, FOR SOLUTION INTRAVENOUS at 21:13

## 2018-02-06 RX ADMIN — LEVALBUTEROL HYDROCHLORIDE 1.25 MG: 1.25 SOLUTION, CONCENTRATE RESPIRATORY (INHALATION) at 19:23

## 2018-02-06 RX ADMIN — INSULIN HUMAN 25 UNITS: 500 INJECTION, SOLUTION SUBCUTANEOUS at 16:56

## 2018-02-06 RX ADMIN — INSULIN HUMAN 45 UNITS: 500 INJECTION, SOLUTION SUBCUTANEOUS at 09:10

## 2018-02-06 RX ADMIN — METOPROLOL TARTRATE 25 MG: 25 TABLET ORAL at 21:12

## 2018-02-06 RX ADMIN — ISODIUM CHLORIDE 3 ML: 0.03 SOLUTION RESPIRATORY (INHALATION) at 08:05

## 2018-02-06 RX ADMIN — CYCLOBENZAPRINE HYDROCHLORIDE 10 MG: 10 TABLET, FILM COATED ORAL at 09:01

## 2018-02-06 RX ADMIN — INSULIN LISPRO 1 UNITS: 100 INJECTION, SOLUTION INTRAVENOUS; SUBCUTANEOUS at 21:15

## 2018-02-06 RX ADMIN — ASPIRIN 81 MG: 81 TABLET, COATED ORAL at 09:01

## 2018-02-06 RX ADMIN — HEPARIN SODIUM 5000 UNITS: 5000 INJECTION, SOLUTION INTRAVENOUS; SUBCUTANEOUS at 05:17

## 2018-02-06 RX ADMIN — METOPROLOL TARTRATE 25 MG: 25 TABLET ORAL at 09:01

## 2018-02-06 RX ADMIN — BUDESONIDE AND FORMOTEROL FUMARATE DIHYDRATE 2 PUFF: 160; 4.5 AEROSOL RESPIRATORY (INHALATION) at 09:06

## 2018-02-06 RX ADMIN — FUROSEMIDE 40 MG: 10 INJECTION, SOLUTION INTRAMUSCULAR; INTRAVENOUS at 17:02

## 2018-02-06 RX ADMIN — BUDESONIDE AND FORMOTEROL FUMARATE DIHYDRATE 2 PUFF: 160; 4.5 AEROSOL RESPIRATORY (INHALATION) at 21:15

## 2018-02-06 RX ADMIN — ISODIUM CHLORIDE 3 ML: 0.03 SOLUTION RESPIRATORY (INHALATION) at 13:34

## 2018-02-06 RX ADMIN — HEPARIN SODIUM 5000 UNITS: 5000 INJECTION, SOLUTION INTRAVENOUS; SUBCUTANEOUS at 21:12

## 2018-02-06 RX ADMIN — OXYCODONE HYDROCHLORIDE 7.5 MG: 5 TABLET ORAL at 09:08

## 2018-02-06 RX ADMIN — FUROSEMIDE 40 MG: 10 INJECTION, SOLUTION INTRAMUSCULAR; INTRAVENOUS at 09:00

## 2018-02-06 RX ADMIN — ISODIUM CHLORIDE 3 ML: 0.03 SOLUTION RESPIRATORY (INHALATION) at 19:23

## 2018-02-06 RX ADMIN — INSULIN LISPRO 1 UNITS: 100 INJECTION, SOLUTION INTRAVENOUS; SUBCUTANEOUS at 11:56

## 2018-02-06 NOTE — PROGRESS NOTES
Cardiology Progress Note - Miryam Mars Sr  81 y o  male MRN: [id number]    Unit/Bed#: [facility] 530-01 Encounter: [id number]      Assessment:  Principal Problem:    Acute on chronic diastolic congestive heart failure ([facility] [address] )  Active Problems:    Obesity (BMI 30-39  9)    Dyslipidemia    Type 2 diabetes mellitus with complication (HCC)    Venous stasis dermatitis of both lower extremities    Chronic respiratory failure with hypoxia ([facility])    COPD (chronic obstructive pulmonary disease) ([facility])    Bilateral lower extremity edema    Chronic kidney disease, stage 3    Non-ST elevation myocardial infarction (NSTEMI), type 2 ([address] )    Poor dentition      Plan:  Patient is comfortable this morning  His CPAP is in place  He is in sinus rhythm on telemetry  He will continue on intravenous diuresis in the setting of significant edema  His potassium is 3 5 this morning and I will supplement this  His BUN and creatinine are stable  Will continue his present dose of intravenous furosemide  Subjective:   Patient seen and examined  No significant events overnight   negative  Objective:     Vitals: Blood pressure 151/70, pulse 77, temperature 98 5 °F (36 9 °C), resp  rate 18, height 6' (1 829 m), weight 120 kg (264 lb 8 8 oz), SpO2 98 %  , Body mass index is 35 88 kg/m² , Orthostatic Blood Pressures    Flowsheet Row Most Recent Value   Blood Pressure  151/70 filed at 02/06/2018 7940   Patient Position - Orthostatic VS  Sitting filed at 02/03/2018 0829      ,      Intake/Output Summary (Last 24 hours) at 02/06/18 0749  Last data filed at 02/06/18 0509   Gross per 24 hour   Intake              480 ml   Output             2950 ml   Net            -2470 ml       No significant arrhythmias seen on telemetry review         Physical Exam:    GEN: Miryam Mars Sr  appears well, alert and oriented x 3, pleasant and cooperative   NECK: supple, no carotid bruits, no JVD or HJR  HEART: normal rate, regular rhythm, normal S1 and S2, no murmurs, clicks, gallops or rubs   LUNGS: clear to auscultation bilaterally; no wheezes, rales, or rhonchi   ABDOMEN: normal bowel sounds, soft, no tenderness, no distention  EXTREMITIES:edema  SKIN: warm and well perfused, no suspicious lesions on exposed skin    Labs & Results:    Admission on 02/01/2018   No results displayed because visit has over 200 results  Xr Chest 2 Views    Result Date: 2/1/2018  Narrative: CHEST INDICATION:  CHF, shortness of breath, crackles, diabetic patient with lower extremity wounds  COMPARISON:  10/31/2017 VIEWS:  Frontal and lateral projections IMAGES:  5 FINDINGS:     There is mild cardiomegaly  There is moderate right pleural effusion, increased compared with prior  There is some consolidation at the right lung base which is probably atelectasis or pneumonia  Unilateral edema less likely but not excluded  There seems to be some mediastinal shift towards the right which would indicate that there is probably significant volume loss in the right lower lobe  No pneumothorax  Osseous structures intact  Impression: Moderate right pleural effusion and consolidation of the right base which is probably mostly atelectasis  Pneumonia not entirely excluded  Cardiomegaly Rightward mediastinal shift Recommend continued follow-up Workstation performed: [id number]       EKG personally reviewed by Emely Remy MD      Counseling / Coordination of Care  Total floor / unit time spent today 30 minutes  Greater than 50% of total time was spent with the patient and / or family counseling and / or coordination of care

## 2018-02-06 NOTE — PLAN OF CARE
Problem: Potential for Falls  Goal: Patient will remain free of falls  INTERVENTIONS:  - Assess patient frequently for physical needs  -  Identify cognitive and physical deficits and behaviors that affect risk of falls  -  [place] fall precautions as indicated by assessment   - Educate patient/family on patient safety including physical limitations  - Instruct patient to call for assistance with activity based on assessment  - Modify environment to reduce risk of injury  - Consider OT/PT consult to assist with strengthening/mobility   Outcome: Progressing      Problem: CARDIOVASCULAR - ADULT  Goal: Maintains optimal cardiac output and hemodynamic stability  INTERVENTIONS:  - Monitor I/O, vital signs and rhythm  - Monitor for S/S and trends of decreased cardiac output i e  bleeding, hypotension  - Administer and titrate ordered vasoactive medications to optimize hemodynamic stability  - Assess quality of pulses, skin color and temperature  - Assess for signs of decreased coronary artery perfusion - ex   Angina  - Instruct patient to report change in severity of symptoms   Outcome: Progressing    Goal: Absence of cardiac dysrhythmias or at baseline rhythm  INTERVENTIONS:  - Continuous cardiac monitoring, monitor vital signs, obtain 12 lead EKG if indicated  - Administer antiarrhythmic and heart rate control medications as ordered  - Monitor electrolytes and administer replacement therapy as ordered   Outcome: Progressing      Problem: RESPIRATORY - ADULT  Goal: Achieves optimal ventilation and oxygenation  INTERVENTIONS:  - Assess for changes in respiratory status  - Assess for changes in mentation and behavior  - Position to facilitate oxygenation and minimize respiratory effort  - Oxygen administration by appropriate delivery method based on oxygen saturation (per order) or ABGs  - Initiate smoking cessation education as indicated  - Encourage broncho-pulmonary hygiene including cough, deep breathe, Incentive Spirometry  - Assess the need for suctioning and aspirate as needed  - Assess and instruct to report SOB or any respiratory difficulty  - Respiratory Therapy support as indicated   Outcome: Progressing      Problem: METABOLIC, FLUID AND ELECTROLYTES - ADULT  Goal: Electrolytes maintained within normal limits  INTERVENTIONS:  - Monitor labs and assess patient for signs and symptoms of electrolyte imbalances  - Administer electrolyte replacement as ordered  - Monitor response to electrolyte replacements, including repeat lab results as appropriate  - Instruct patient on fluid and nutrition as appropriate   Outcome: Progressing    Goal: Fluid balance maintained  INTERVENTIONS:  - Monitor labs and assess for signs and symptoms of volume excess or deficit  - Monitor I/O and WT  - Instruct patient on fluid and nutrition as appropriate   Outcome: Progressing    Goal: Glucose maintained within target range  INTERVENTIONS:  - Monitor Blood Glucose as ordered  - Assess for signs and symptoms of hyperglycemia and hypoglycemia  - Administer ordered medications to maintain glucose within target range  - Assess nutritional intake and initiate nutrition service referral as needed   Outcome: Progressing      Problem: SKIN/TISSUE INTEGRITY - ADULT  Goal: Skin integrity remains intact  INTERVENTIONS  - Identify patients at risk for skin breakdown  - Assess and monitor skin integrity  - Assess and monitor nutrition and hydration status  - Monitor labs (i e  albumin)  - Assess for incontinence   - Turn and reposition patient  - Assist with mobility/ambulation  - Relieve pressure over bony prominences  - Avoid friction and shearing  - Provide appropriate hygiene as needed including keeping skin clean and dry  - Evaluate need for skin moisturizer/barrier cream  - Collaborate with interdisciplinary team (i e  Nutrition, Rehabilitation, etc )   - Patient/family teaching   Outcome: Progressing    Goal: Incision(s), wounds(s) or drain site(s) healing without S/S of infection  INTERVENTIONS  - Assess and document risk factors for skin impairment   - Assess and document dressing, incision, wound bed, drain sites and surrounding tissue  - Initiate Nutrition services consult and/or wound management as needed   Outcome: Progressing    Goal: Oral mucous membranes remain intact  INTERVENTIONS  - Assess oral mucosa and hygiene practices  - Implement preventative oral hygiene regimen  - Implement oral medicated treatments as ordered  - Initiate Nutrition services referral as needed   Outcome: Progressing      Problem: PAIN - ADULT  Goal: Verbalizes/displays adequate comfort level or baseline comfort level  Interventions:  - Encourage patient to monitor pain and request assistance  - Assess pain using appropriate pain scale  - Administer analgesics based on type and severity of pain and evaluate response  - Implement non-pharmacological measures as appropriate and evaluate response  - Consider cultural and social influences on pain and pain management  - Notify physician/advanced practitioner if interventions unsuccessful or patient reports new pain   Outcome: Progressing      Problem: DISCHARGE PLANNING  Goal: Discharge to home or other facility with appropriate resources  INTERVENTIONS:  - Identify barriers to discharge w/patient and caregiver  - Arrange for needed discharge resources and transportation as appropriate  - Identify discharge learning needs (meds, wound care, etc )  - Arrange for interpretive services to assist at discharge as needed  - Refer to Case Management Department for coordinating discharge planning if the patient needs post-hospital services based on physician/advanced practitioner order or complex needs related to functional status, cognitive ability, or social support system   Outcome: Progressing      Problem: Knowledge Deficit  Goal: Patient/family/caregiver demonstrates understanding of disease process, treatment plan, medications, and discharge instructions  Complete learning assessment and assess knowledge base    Interventions:  - Provide teaching at level of understanding  - Provide teaching via preferred learning methods   Outcome: Progressing      Problem: DISCHARGE PLANNING - CARE MANAGEMENT  Goal: Discharge to post-acute care or home with appropriate resources  INTERVENTIONS:  - Conduct assessment to determine patient/family and health care team treatment goals, and need for post-acute services based on payer coverage, community resources, and patient preferences, and barriers to discharge  - Address psychosocial, clinical, and financial barriers to discharge as identified in assessment in conjunction with the patient/family and health care team  - Arrange appropriate level of post-acute services according to patient's   needs and preference and payer coverage in collaboration with the physician and health care team  - Communicate with and update the patient/family, physician, and health care team regarding progress on the discharge plan  - Arrange appropriate transportation to post-acute venues   Outcome: Progressing

## 2018-02-06 NOTE — PROGRESS NOTES
[facility] Progress Note  Patient: Shaheen Gill  81 y o  male   MRN: [id number]  PCP: Walter Polk MD  [facility]counter: [id number]  Date Of Visit: 02/06/18    Assessment:    Principal Problem:    Acute on chronic diastolic congestive heart failure ([address] )  Active Problems:    Obesity (BMI 30-39  9)    Dyslipidemia    Type 2 diabetes mellitus with complication (HCC)    Venous stasis dermatitis of both lower extremities    Chronic respiratory failure with hypoxia (HCC)    COPD (chronic obstructive pulmonary disease) (HCC)    Bilateral lower extremity edema    Chronic kidney disease, stage 3    Non-ST elevation myocardial infarction (NSTEMI), type 2 (HCC)    Poor dentition      Plan:    · Acute on chronic diastolic congestive heart failure on IV Lasix, less than 2 g salt diet fluid restriction monitor kidney function, electrolytes, cardiology input noted  · NSTEMI 2 likely due to 1 above  · Chronic hypoxic respiratory failure continue supplemental oxygen to keep O2 sats more than 88%  · Chronic kidney disease stage 3 monitor kidney function closely, avoid nephrotoxins  · COPD stable, continue respiratory treatments  · Diabetes mellitus type 2 A1c 6 8 on 11/01/2017 continue insulin monitor Accu-Cheks  · Morbid obesity  · Out of bed as able       VTE Pharmacologic Prophylaxis:   Pharmacologic: Heparin  Mechanical VTE Prophylaxis in Place: Yes    Patient Centered Rounds: I have performed bedside rounds with nursing staff today  Discussions with Specialists or Other Care Team Provider:      Education and Discussions with Family / Patient: pt    Time Spent for Care: 30 minutes  More than 50% of total time spent on counseling and coordination of care as described above      Current Length of Stay: 5 day(s)    Current Patient Status: Inpatient   Certification Statement: The patient will continue to require additional inpatient hospital stay due to As outlined    Discharge Plan / Estimated Discharge Date:  CHF requiring IV medications close monitoring as outlined    Code Status: Level 1 - Full Code      Subjective:     Comfortably sitting up in chair  Reports improving lower extremity swelling  Reports improving dyspnea    Objective:     Vitals:   Temp (24hrs), Av 1 °F (36 7 °C), Min:97 5 °F (36 4 °C), Max:98 5 °F (36 9 °C)    HR:  [77-94] 88  Resp:  [18-20] 18  BP: (128-155)/(70-82) 128/72  SpO2:  [92 %-98 %] 95 %  Body mass index is 35 88 kg/m²  Input and Output Summary (last 24 hours): Intake/Output Summary (Last 24 hours) at 18 1548  Last data filed at 18 1316   Gross per 24 hour   Intake              800 ml   Output             2600 ml   Net            -1800 ml       Physical Exam:     Physical Exam     Comfortably sitting up in chair  Neck supple  Lungs diminished breath sounds at the bases no additional sounds  Heart sounds S1-S2 noted distant no murmurs appreciable  Abdomen soft nontender  Pedal edema noted  Chronic venous stasis changes noted  Awake obeys simple commands  No rash      Additional Data:     Labs:      Results from last 7 days  Lab Units 18  0451   WBC Thousand/uL 11 46*   HEMOGLOBIN g/dL 13 2   HEMATOCRIT % 44 1   PLATELETS Thousands/uL 173   NEUTROS PCT % 75   LYMPHS PCT % 14   MONOS PCT % 9   EOS PCT % 2       Results from last 7 days  Lab Units 18  0500 18  0451   SODIUM mmol/L 135* 133*   POTASSIUM mmol/L 3 5 4 4   CHLORIDE mmol/L 93* 92*   CO2 mmol/L 34* 38*   BUN mg/dL 23 18   CREATININE mg/dL 1 47* 1 44*   CALCIUM mg/dL 8 6 8 9   TOTAL PROTEIN g/dL  --  8 3*   BILIRUBIN TOTAL mg/dL  --  0 59   ALK PHOS U/L  --  72   ALT U/L  --  37   AST U/L  --  57*   GLUCOSE RANDOM mg/dL 226* 116           * I Have Reviewed All Lab Data Listed Above  * Additional Pertinent Lab Tests Reviewed:  All Labs For Current Hospital Admission Reviewed    Imaging:    Imaging Reports Reviewed Today Include:  Imaging studies reviewed  Imaging Personally Reviewed by Myself Includes:     Recent Cultures (last 7 days):           Last 24 Hours Medication List:     Current Facility-Administered Medications:  acetaminophen 325 mg Oral BID Radhika Kruse MD   acetaZOLAMIDE 250 mg Intravenous Q12H [facility] Garcia ZengDO lilia   albuterol 2 5 mg Nebulization Q4H PRN Kellen Collins MD   aspirin 81 mg Oral Daily Kellen Collins MD   benzocaine  Mucosal 4x Daily PRN YOLANDE Sam   budesonide-formoterol 2 puff Inhalation BID Kellen Collins MD   cyclobenzaprine 10 mg Oral BID Adrianna Conchita, PA-C   ferrous sulfate 325 mg Oral Daily With Breakfast Kellen Collins MD   fluticasone 1 spray Each Nare Daily Radhika Kruse MD   furosemide 40 mg Intravenous BID Kellen Collins MD   gabapentin 300 mg Oral TID Adrianna Conchita, PA-C   heparin (porcine) 5,000 Units Subcutaneous Q8H [facility] Kellen Collins MD   insulin lispro 1-5 Units Subcutaneous TID Mitzi Meeks MD   insulin lispro 1-5 Units Subcutaneous HS Kellen Collins MD   insulin regular 25 Units Subcutaneous Before Donata BastonYOLANDE   insulin regular 45 Units Subcutaneous BID before breakfast/lunch YOLANDE Mathews   levalbuterol 1 25 mg Nebulization TID Radhika Kruse MD   metoprolol tartrate 25 mg Oral Q12H [facility] Merlin Meeks MD   oxyCODONE 5 mg Oral Q4H PRN Kellen Collins MD   oxyCODONE 7 5 mg Oral BID Adrianna Conchita, RICK   oxyCODONE 10 mg Oral Q4H PRN Kellen Collins MD   pantoprazole 40 mg Oral BID AC Lucky Mortimer Psaila, MD   potassium chloride 20 mEq Oral BID Kina Grant MD   pravastatin 40 mg Oral Daily With Maria Ines Erwin MD   sodium chloride 3 mL Nebulization TID Radhika Kruse MD   tiotropium 18 mcg Inhalation Daily Kellen Collins MD        Today, Patient Was Seen By: Delroy Rouse MD    ** Please Note: This note has been constructed using a voice recognition system   **

## 2018-02-07 ENCOUNTER — APPOINTMENT (INPATIENT)
Dept: RADIOLOGY | Facility: HOSPITAL | Age: 73
DRG: 291 | End: 2018-02-07
Attending: INTERNAL MEDICINE
Payer: COMMERCIAL

## 2018-02-07 LAB
ANION GAP SERPL CALCULATED.3IONS-SCNC: 6 MMOL/L (ref 4–13)
BUN SERPL-MCNC: 25 MG/DL (ref 5–25)
CALCIUM SERPL-MCNC: 8.8 MG/DL (ref 8.3–10.1)
CHLORIDE SERPL-SCNC: 95 MMOL/L (ref 100–108)
CO2 SERPL-SCNC: 35 MMOL/L (ref 21–32)
CREAT SERPL-MCNC: 1.46 MG/DL (ref 0.6–1.3)
GFR SERPL CREATININE-BSD FRML MDRD: 48 ML/MIN/1.73SQ M
GLUCOSE SERPL-MCNC: 166 MG/DL (ref 65–140)
GLUCOSE SERPL-MCNC: 181 MG/DL (ref 65–140)
GLUCOSE SERPL-MCNC: 200 MG/DL (ref 65–140)
GLUCOSE SERPL-MCNC: 214 MG/DL (ref 65–140)
GLUCOSE SERPL-MCNC: 287 MG/DL (ref 65–140)
POTASSIUM SERPL-SCNC: 3.9 MMOL/L (ref 3.5–5.3)
SODIUM SERPL-SCNC: 136 MMOL/L (ref 136–145)

## 2018-02-07 PROCEDURE — 99232 SBSQ HOSP IP/OBS MODERATE 35: CPT | Performed by: INTERNAL MEDICINE

## 2018-02-07 PROCEDURE — 94640 AIRWAY INHALATION TREATMENT: CPT

## 2018-02-07 PROCEDURE — 82948 REAGENT STRIP/BLOOD GLUCOSE: CPT

## 2018-02-07 PROCEDURE — 71046 X-RAY EXAM CHEST 2 VIEWS: CPT

## 2018-02-07 PROCEDURE — 94760 N-INVAS EAR/PLS OXIMETRY 1: CPT

## 2018-02-07 PROCEDURE — 80048 BASIC METABOLIC PNL TOTAL CA: CPT | Performed by: INTERNAL MEDICINE

## 2018-02-07 RX ADMIN — BUDESONIDE AND FORMOTEROL FUMARATE DIHYDRATE 2 PUFF: 160; 4.5 AEROSOL RESPIRATORY (INHALATION) at 17:44

## 2018-02-07 RX ADMIN — WATER 5 ML: 1 INJECTION INTRAMUSCULAR; INTRAVENOUS; SUBCUTANEOUS at 09:21

## 2018-02-07 RX ADMIN — OXYCODONE HYDROCHLORIDE 7.5 MG: 5 TABLET ORAL at 17:43

## 2018-02-07 RX ADMIN — INSULIN LISPRO 1 UNITS: 100 INJECTION, SOLUTION INTRAVENOUS; SUBCUTANEOUS at 12:20

## 2018-02-07 RX ADMIN — METOPROLOL TARTRATE 25 MG: 25 TABLET ORAL at 22:43

## 2018-02-07 RX ADMIN — HEPARIN SODIUM 5000 UNITS: 5000 INJECTION, SOLUTION INTRAVENOUS; SUBCUTANEOUS at 06:49

## 2018-02-07 RX ADMIN — ACETAZOLAMIDE 250 MG: 500 INJECTION, POWDER, LYOPHILIZED, FOR SOLUTION INTRAVENOUS at 09:20

## 2018-02-07 RX ADMIN — FUROSEMIDE 40 MG: 10 INJECTION, SOLUTION INTRAMUSCULAR; INTRAVENOUS at 08:26

## 2018-02-07 RX ADMIN — GABAPENTIN 300 MG: 300 CAPSULE ORAL at 08:22

## 2018-02-07 RX ADMIN — WATER: 1 INJECTION INTRAMUSCULAR; INTRAVENOUS; SUBCUTANEOUS at 22:56

## 2018-02-07 RX ADMIN — INSULIN HUMAN 45 UNITS: 500 INJECTION, SOLUTION SUBCUTANEOUS at 12:21

## 2018-02-07 RX ADMIN — LEVALBUTEROL HYDROCHLORIDE 1.25 MG: 1.25 SOLUTION, CONCENTRATE RESPIRATORY (INHALATION) at 19:34

## 2018-02-07 RX ADMIN — HEPARIN SODIUM 5000 UNITS: 5000 INJECTION, SOLUTION INTRAVENOUS; SUBCUTANEOUS at 14:22

## 2018-02-07 RX ADMIN — ISODIUM CHLORIDE 3 ML: 0.03 SOLUTION RESPIRATORY (INHALATION) at 14:00

## 2018-02-07 RX ADMIN — ACETAZOLAMIDE 250 MG: 500 INJECTION, POWDER, LYOPHILIZED, FOR SOLUTION INTRAVENOUS at 22:43

## 2018-02-07 RX ADMIN — INSULIN HUMAN 25 UNITS: 500 INJECTION, SOLUTION SUBCUTANEOUS at 17:38

## 2018-02-07 RX ADMIN — POTASSIUM CHLORIDE 20 MEQ: 1500 TABLET, EXTENDED RELEASE ORAL at 17:44

## 2018-02-07 RX ADMIN — ISODIUM CHLORIDE 3 ML: 0.03 SOLUTION RESPIRATORY (INHALATION) at 08:21

## 2018-02-07 RX ADMIN — TIOTROPIUM BROMIDE 18 MCG: 18 CAPSULE ORAL; RESPIRATORY (INHALATION) at 09:19

## 2018-02-07 RX ADMIN — CYCLOBENZAPRINE HYDROCHLORIDE 10 MG: 10 TABLET, FILM COATED ORAL at 17:42

## 2018-02-07 RX ADMIN — LEVALBUTEROL HYDROCHLORIDE 1.25 MG: 1.25 SOLUTION, CONCENTRATE RESPIRATORY (INHALATION) at 08:20

## 2018-02-07 RX ADMIN — OXYCODONE HYDROCHLORIDE 7.5 MG: 5 TABLET ORAL at 08:20

## 2018-02-07 RX ADMIN — PANTOPRAZOLE SODIUM 40 MG: 40 TABLET, DELAYED RELEASE ORAL at 17:32

## 2018-02-07 RX ADMIN — ACETAMINOPHEN 325 MG: 325 TABLET, FILM COATED ORAL at 08:20

## 2018-02-07 RX ADMIN — FLUTICASONE PROPIONATE 1 SPRAY: 50 SPRAY, METERED NASAL at 08:22

## 2018-02-07 RX ADMIN — INSULIN HUMAN 45 UNITS: 500 INJECTION, SOLUTION SUBCUTANEOUS at 08:27

## 2018-02-07 RX ADMIN — Medication 325 MG: at 08:21

## 2018-02-07 RX ADMIN — INSULIN LISPRO 1 UNITS: 100 INJECTION, SOLUTION INTRAVENOUS; SUBCUTANEOUS at 08:28

## 2018-02-07 RX ADMIN — ASPIRIN 81 MG: 81 TABLET, COATED ORAL at 08:21

## 2018-02-07 RX ADMIN — INSULIN LISPRO 1 UNITS: 100 INJECTION, SOLUTION INTRAVENOUS; SUBCUTANEOUS at 22:56

## 2018-02-07 RX ADMIN — GABAPENTIN 300 MG: 300 CAPSULE ORAL at 17:32

## 2018-02-07 RX ADMIN — FUROSEMIDE 40 MG: 10 INJECTION, SOLUTION INTRAMUSCULAR; INTRAVENOUS at 17:44

## 2018-02-07 RX ADMIN — HEPARIN SODIUM 5000 UNITS: 5000 INJECTION, SOLUTION INTRAVENOUS; SUBCUTANEOUS at 22:43

## 2018-02-07 RX ADMIN — ISODIUM CHLORIDE 3 ML: 0.03 SOLUTION RESPIRATORY (INHALATION) at 19:34

## 2018-02-07 RX ADMIN — LEVALBUTEROL HYDROCHLORIDE 1.25 MG: 1.25 SOLUTION, CONCENTRATE RESPIRATORY (INHALATION) at 14:00

## 2018-02-07 RX ADMIN — OXYCODONE HYDROCHLORIDE 5 MG: 5 TABLET ORAL at 23:02

## 2018-02-07 RX ADMIN — METOPROLOL TARTRATE 25 MG: 25 TABLET ORAL at 08:21

## 2018-02-07 RX ADMIN — POTASSIUM CHLORIDE 20 MEQ: 1500 TABLET, EXTENDED RELEASE ORAL at 08:22

## 2018-02-07 RX ADMIN — PANTOPRAZOLE SODIUM 40 MG: 40 TABLET, DELAYED RELEASE ORAL at 06:49

## 2018-02-07 RX ADMIN — BUDESONIDE AND FORMOTEROL FUMARATE DIHYDRATE 2 PUFF: 160; 4.5 AEROSOL RESPIRATORY (INHALATION) at 08:24

## 2018-02-07 RX ADMIN — CYCLOBENZAPRINE HYDROCHLORIDE 10 MG: 10 TABLET, FILM COATED ORAL at 08:21

## 2018-02-07 RX ADMIN — ACETAMINOPHEN 325 MG: 325 TABLET, FILM COATED ORAL at 17:42

## 2018-02-07 RX ADMIN — GABAPENTIN 300 MG: 300 CAPSULE ORAL at 22:42

## 2018-02-07 RX ADMIN — PRAVASTATIN SODIUM 40 MG: 40 TABLET ORAL at 17:32

## 2018-02-07 RX ADMIN — INSULIN LISPRO 2 UNITS: 100 INJECTION, SOLUTION INTRAVENOUS; SUBCUTANEOUS at 17:33

## 2018-02-07 NOTE — RESTORATIVE TECHNICIAN NOTE
Restorative Specialist Mobility Note       Pt refused ambulation at this time  Pt noted he has been ambulating (in his room, bed/bathroom) but can't go far distances due to back pain  Pt also noted he rather not ambulate because it will add more pain to his back  Will continue to follow up with ambulation attempt(s) as needed      Yao Jaimes Restorative Specialist

## 2018-02-07 NOTE — CASE MANAGEMENT
Aroldo Mcdonald, RN Registered Nurse Addendum   Case Management Date of Service: 2/2/2018 10:52 AM         []Hide copied text     Thank you,  [address]  [facility] in the [facility] by Onofre Carroll for 2017  Network Utilization Review Department  Phone: [phone]; Fax [phone]  ATTENTION: The Network Utilization Review Department is now centralized for our 7 Facilities  Make a note that we have a new phone and fax numbers for our Department  Please call with any questions or concerns to [phone] and carefully follow the prompts so that you are directed to the right person  All voicemails are confidential  Fax any determinations, approvals, denials, and requests for initial or continue stay review clinical to [phone]  Due to HIGH CALL volume, it would be easier if you could please send faxed requests to expedite your requests and in part, help us provide discharge notifications faster            Initial Clinical Review     Admission: Date/Time/Statement:   2/1/18 AT 1546            Orders Placed This Encounter   Procedures    Inpatient Admission (expected length of stay for this patient is greater than two midnights)       Standing Status:   Standing       Number of Occurrences:   1       Order Specific Question:   Admitting Physician       Answer:   Mady Hernandez [[id number]]       Order Specific Question:   Level of Care       Answer:   Med Surg [16]       Order Specific Question:   Estimated length of stay       Answer:   More than 2 Midnights       Order Specific Question:   Certification       Answer:   I certify that inpatient services are medically necessary for this patient for a duration of greater than two midnights   See H&P and MD Progress Notes for additional information about the patient's course of treatment       ED: Date/Time/Mode of Arrival:             ED Arrival Information      Expected Arrival Acuity Means of Arrival Escorted By Service Admission Type     - 2/1/2018 13:02 Urgent Wheelchair Self General Medicine Urgent     Arrival Complaint     -             Chief Complaint:        Chief Complaint   Patient presents with    Leg Swelling       Pt wife states "I took him to his primary doctor today and he has a bad toothache and swollen legs " Sent to ED for further evaluation of "fluid because of his heart "      Chief Complaint:   I have been short of breath     History of Present Illness:   Miryam Mars Sr  is a 81 y  o  male who presents with shortness of breath  Patient reports increasing lower extremity edema and shortness of breath over the last several days   Denies chest pain although wife feels he might have had some chest pressure few days ago  Michelle Walden noted increasing weight gain over the last few weeks to months of 8-10 lb   Denies PND orthopnea positive lower extremity edema has noted some weeping from skin over the last day or 2        Review of Systems:  Constitutional: Positive for unexpected weight change  Negative for chills, diaphoresis, fatigue and fever  Respiratory: Positive for shortness of breath  Negative for choking, chest tightness, wheezing and stridor     Cardiovascular: Positive for leg swelling  Negative for chest pain and palpitations  Gastrointestinal: Negative for blood in stool, constipation, diarrhea, nausea and vomiting  Genitourinary: Negative for dysuria, frequency, hematuria and urgency  Musculoskeletal: Negative for arthralgias and back pain     Skin: Negative for color change          ED Vital Signs:   ED Triage Vitals [02/01/18 1318]   Temperature Pulse Respirations Blood Pressure SpO2   98 7 °F (37 1 °C) 94 18 130/60 90 %       Temp Source Heart Rate Source Patient Position - Orthostatic VS BP Location FiO2 (%)   Oral Monitor Sitting Left arm --       Pain Score           8                Wt Readings from Last 1 Encounters:   02/02/18 131 kg (289 lb 3 9 oz)      02/01 0701  02/02 0700 02/02 0701  02/02 1051   Most Recent       Temperature (°F) 98 498 7 98 5   98 5 (36 9)     Pulse 7899 84   84     Respirations 1820 18   18     Blood Pressure 124/59189/90 127/84   127/84     SpO2 (%) 9096 93   93           LABS/Diagnostic Test Results:   Results from last 7 days  Lab Units 02/01/18  1400   WBC Thousand/uL 11 69*   HEMOGLOBIN g/dL 13 0   HEMATOCRIT % 43 8   PLATELETS Thousands/uL 172   NEUTROS PCT % 86*   LYMPHS PCT % 7*   MONOS PCT % 7   EOS PCT % 0       Results from last 7 days  Lab Units 02/01/18  1400   SODIUM mmol/L 140   POTASSIUM mmol/L 4 7   CHLORIDE mmol/L 100   CO2 mmol/L 37*   BUN mg/dL 19   CREATININE mg/dL 1 46*   CALCIUM mg/dL 8 2*   TOTAL PROTEIN g/dL 7 8   BILIRUBIN TOTAL mg/dL 0 27   ALK PHOS U/L 72   ALT U/L 46   AST U/L 55*   GLUCOSE RANDOM mg/dL 95             BNP [[id number]] (Abnormal) Collected: 02/01/18 1400   Lab Status: Final result Specimen: Blood from Arm, Right Updated: 02/01/18 1427     NT-proBNP 1,580 (H) <125 pg/mL           TROPONIN  0,12,    0 27,   0 25     EKG (per H+P):  NSR; RBBB     CHEST X RAY -  Moderate right pleural effusion and consolidation of the right base which is probably mostly atelectasis   Pneumonia not entirely excluded  Cardiomegaly  Rightward mediastinal shift        ED Treatment:              Medication Administration from 02/01/2018 1301 to 02/01/2018 1943        Date/Time Order Dose Route Action Action by Comments       02/01/2018 1500 aspirin chewable tablet 324 mg 324 mg Oral Given Mira Argueta RN         02/01/2018 1557 furosemide (LASIX) injection 40 mg 40 mg Intravenous Given Mira Argueta RN               Past Medical/Surgical History:         Active Ambulatory Problems     Diagnosis Date Noted    Coronary artery disease involving native coronary artery without angina pectoris 01/20/2016    Gastroesophageal reflux disease without esophagitis 01/20/2016    Obesity (BMI 30-39 9) 01/20/2016    Dyslipidemia 01/20/2016    H/O prostate cancer 01/20/2016    Type 2 diabetes mellitus with complication ([place] [address] ) 17/31/3324    Venous stasis dermatitis of both lower extremities 11/15/2016    Opioid dependence ([place] [address] ) 11/17/2016    Chronic respiratory failure with hypoxia ([facility]) 07/23/2017    COPD (chronic obstructive pulmonary disease) ([facility]) 07/23/2017    Bilateral lower extremity edema 07/24/2017    Chronic kidney disease, stage 3 07/24/2017           Resolved Ambulatory Problems     Diagnosis Date Noted    Chest pain 01/20/2016    COPD exacerbation ([place] [address] ) 01/20/2016    Back pain 11/12/2016    Dizziness 11/12/2016    Diabetic polyneuropathy associated with type 2 diabetes mellitus ([place] [address] ) 11/15/2016    Leukocytosis 07/17/2017    Hyponatremia 07/24/2017    Partial gastric outlet obstruction 09/28/2017    Duodenitis 09/28/2017    Hypernatremia 10/01/2017    Acute duodenal ulcer with bleeding 10/02/2017    Acute blood loss anemia 10/02/2017    SOB (shortness of breath) 10/31/2017    Pleural effusion on right 10/31/2017    Accelerated hypertension 10/31/2017           Past Medical History:   Diagnosis Date    Abdominal pain      Cardiac disease      COPD (chronic obstructive pulmonary disease) ([facility])      Coronary artery disease      Diabetes mellitus ([facility])      Hyperlipidemia      Hypertension      MI (myocardial infarction)      Prostate cancer ([place] [address] )           Admitting Diagnosis: CHF (congestive heart failure) ([facility]) [I50 9]  History of coronary artery disease [Z86 79]  NSTEMI (non-ST elevated myocardial infarction) ([place] [address] ) [I21 4]  Acute on chronic diastolic congestive heart failure ([facility]) [I50 33]  Bilateral lower extremity edema [R60 0]  Venous stasis dermatitis of both lower extremities [I87 2]  Stage 3 chronic kidney disease [N18 3]     Age/Sex: 81 y o  male     Assessment/Plan:        * Acute on chronic diastolic congestive heart failure ([facility])   Assessment & Plan     · Patient with evidence acute congestive heart failure with known quadrant assault dysfunction  · IV diameter is given in ER  · Continue diuresis cardiology evaluati          Type 2 diabetes mellitus with complication (HCC)   Assessment & Plan     · Continue outpatient medications  · Patient noted to be on U 500  · Monitor sugars with sliding scale coverage          Elevated troponin   Assessment & Plan     · Elevated troponin  · No acute changes on EKG  · Doubt acute coronary syndrome cardiology to evaluate continue med  · Defer echocardiogram to cardiology          Chronic kidney disease, stage 3   Assessment & Plan     · Renal function near baseline   · Consider Nephrology evaluation should creatinine worsened   · Monitor renal function on IV diuretics          Obesity (BMI 30-39  9)   Assessment & Plan     · Obesity noted present a mission   · Dietary maneuvers reviewed             VTE Prophylaxis: Heparin  / reason for no mechanical VTE prophylaxis not indicated   Code Status:  Full code  POLST: POLST form is not discussed and not completed at this time  Discussion with family:  Patient's wife at bedside     Anticipated Length of Stay: Flo Chand will be admitted on an Inpatient basis with an anticipated length of stay of  greater than 2 midnights      ustification for Hospital Stay:  Patient with elevated troponin acute does saw congestive heart failure will in all likelihood require hospitalization for greater than 48 hours           Admission Orders:  2/1/18 AT 1546   ADMIT INPATIENT  TELEMTRY  VS Q4HRS     Up as Tolerated  Ambulate TID  SCD     NASAL O2 AT 2-3 L / Min  Continuous Pulse Oximetry     Diet Mani/CHO Controlled; Consistent Carbohydrate Diet Level 2 (5 carb servings/75 grams CHO/meal);  Sodium 2 Gm (Order [id number])        IV ACCESS     Scheduled Meds:   Current Facility-Administered Medications:  acetaminophen 650 mg Oral Q4H PRN Scotty Ledesma MD   albuterol 2 5 mg Nebulization Q4H PRN Scotty Ledesma MD   aspirin 81 mg Oral Daily Scotty Ledesma MD budesonide-formoterol 2 puff Inhalation BID Riddhi Argueta MD   cyclobenzaprine 10 mg Oral HS Eneida Meeks MD   ferrous sulfate 325 mg Oral Daily With Breakfast Riddhi Argueta MD   furosemide 40 mg Intravenous BID Riddhi Argueta MD   gabapentin 300 mg Oral HS Eneida Meeks MD   heparin (porcine) 5,000 Units Subcutaneous Q8H [address] Riddhi Argueta MD   insulin lispro 1-5 Units Subcutaneous TID Elecathleen Meeks MD   insulin lispro 1-5 Units Subcutaneous HS Eneida Meeks MD   insulin regular 35 Units Subcutaneous Before Laqueta HamburgMD yamileth   insulin regular 55 Units Subcutaneous BID before breakfast/lunch Deniz Romeo PA-C   metoprolol tartrate 25 mg Oral Q12H [address] Merlin Meeks MD   oxyCODONE 5 mg Oral Q4H PRN Riddhi Argueta MD   oxyCODONE 10 mg Oral Q4H PRN Riddhi Argueta MD   pantoprazole 40 mg Oral BID AC Eneida Meeks MD   potassium chloride 10 mEq Oral Daily Riddhi Argueta MD   pravastatin 40 mg Oral Daily With Delilah Meeks MD   tiotropium 18 mcg Inhalation Daily Riddhi Argueta MD      PRN Meds:     acetaminophen    albuterol    oxyCODONE 10 mg q4hrs prn given x 1        CONSULT CARD      CONSULT RENAL            Revision History      Foster Ripa, RN Registered Nurse Signed   Case Management Date of Service: 2/5/2018 12:50 PM         []Hide copied text     Thank you,  [address] [place] [address] in the [facility] by Onofre Carroll for 2017  Network Utilization Review Department  Phone: [phone]; Fax [phone]  ATTENTION: The Network Utilization Review Department is now centralized for our 7 Facilities  Make a note that we have a new phone and fax numbers for our Department  Please call with any questions or concerns to [phone] and carefully follow the prompts so that you are directed to the right person   All voicemails are confidential  Fax any determinations, approvals, denials, and requests for initial or continue stay review clinical to [phone]  Due to HIGH CALL volume, it would be easier if you could please send faxed requests to expedite your requests and in part, help us provide discharge notifications faster            Continued Stay Review     Date: 2/3/18 Saturday ACUTE MED SURG LEVEL OF CARE     Vital Signs:  Temp (24hrs), Av 2 °F (36 8 °C), Min:97 8 °F (36 6 °C), Max:98 8 °F (37 1 °C)   HR:  [62-90] 90  Resp:  [18-20] 18  BP: (120-158)/(59-87) 158/87  SpO2:  [92 %-95 %] 95 %  Body mass index is 38 09 kg/m²       Input and Output Summary (last 24 hours):   Last data filed at 18 0948    Gross per 24 hour   Intake             1240 ml   Output             4975 ml   Net            -3735 ml         Diet Mani/CHO Controlled; Consistent Carbohydrate Diet Level 2 (5 carb servings/75 grams CHO/meal);  Sodium 2 Gm        IV ACCESS        Medications:   Scheduled Meds:   Current Facility-Administered Medications:  acetaminophen 650 mg Oral Q4H PRN Jyothi Roman MD   acetaZOLAMIDE 250 mg Intravenous Q12H [facility] Felipe Oswald DO   albuterol 2 5 mg Nebulization Q4H PRN Jyothi Roman MD   aspirin 81 mg Oral Daily Jyothi Roman MD   benzocaine   Mucosal 4x Daily PRN YOLANDE Carl   budesonide-formoterol 2 puff Inhalation BID Jyothi Roman MD   cyclobenzaprine 10 mg Oral HS Jyothi Roman MD   ferrous sulfate 325 mg Oral Daily With Breakfast Jyothi Roman MD   fluticasone 1 spray Each Nare Daily Nikos Barrientos MD   furosemide 40 mg Intravenous BID Jyothi Roman MD   gabapentin 300 mg Oral HS Gennaro Meeks MD   heparin (porcine) 5,000 Units Subcutaneous Q8H [address] MD Jeanna   insulin lispro 1-5 Units Subcutaneous TID Lizabeth Lundborg Psaila, MD   insulin lispro 1-5 Units Subcutaneous HS Jyothi Roman MD   insulin regular 25 Units Subcutaneous Before Dinner YOLANDE Bernabe   insulin regular 45 Units Subcutaneous BID before breakfast/lunch Colon Schirmer, CRNP   levalbuterol 1 25 mg Nebulization TID Bandar Burns MD   metoprolol tartrate 25 mg Oral Q12H [address] Caterina Barrett MD   oxyCODONE 5 mg Oral Q4H PRN Caterina Barrett MD   oxyCODONE 10 mg Oral Q4H PRN Caterina Barrett MD   pantoprazole 40 mg Oral BID AC Merlin Meeks MD   potassium chloride 10 mEq Oral Daily Caterina Barrett MD   pravastatin 40 mg Oral Daily With Pamela Alford MD   sodium chloride 3 mL Nebulization TID Bandar Burns MD   tiotropium 18 mcg Inhalation Daily Caterina Barrett MD         PRN Meds:     acetaminophen    NEB Tx with albuterol 2 5 mg q4hrs prn given x 1/ 24hrs    benzocaine    oxyCODONE 5 mg q4hrs prn given x 1/ 24 hrs    oxyCODONE 10 mg q4hrs prn given x 2/ 24 hrs        LABS/Diagnostic Results:   Results from last 7 days  Lab Units 02/03/18  0502   02/01/18  1400   WBC Thousand/uL 9 00  < > 11 69*   HEMOGLOBIN g/dL 11 8*  < > 13 0   HEMATOCRIT % 40 8  < > 43 8   PLATELETS Thousands/uL 151  < > 172   NEUTROS PCT %  --   --  86*   LYMPHS PCT %  --   --  7*   LYMPHO PCT % 11*  --   --    MONOS PCT %  --   --  7   MONO PCT MAN % 9  --   --    EOS PCT %  --   --  0   EOSINO PCT MANUAL % 1  --   --       Results from last 7 days  Lab Units 02/03/18  0502   SODIUM mmol/L 140   POTASSIUM mmol/L 4 3   CHLORIDE mmol/L 96*   CO2 mmol/L 43*   BUN mg/dL 19   CREATININE mg/dL 1 27   CALCIUM mg/dL 8 1*   TOTAL PROTEIN g/dL 7 3   BILIRUBIN TOTAL mg/dL 0 49   ALK PHOS U/L 63   ALT U/L 35   AST U/L 69*   GLUCOSE RANDOM mg/dL 41*            Age/Sex: 81 y o  male      Assessment/Plan:         * Acute on chronic diastolic congestive heart failure (HCC)   Assessment & Plan     · noncompliant with Lasix prior to coming in   · Pending am wt, pt initially refused and is now willing to have it checked by RN- discussed the importance of checking it daily in the am with patient and he agreed   · C/w lasix 40mg IV BID   · Cardiology following  · Monitor input output             Poor dentition   Assessment & Plan   · Monitor teeth  · Currently no significant signs of infection, low threshold for starting abxs   · Outpatient f/u with dentist after discharge           Non-ST elevation myocardial infarction (NSTEMI), type 2 ([facility] [address] )   Assessment & Plan     · In the setting of heart failure   · Cardiology following, no further w/u          Chronic kidney disease, stage 3   Assessment & Plan     · Creat baseline appears to be ~1 2-1 4  · Creat improved today to 1 27  · Continue IV diuretics  · Follow-up BMP in am  · Avoid any further nephrotoxic drugs or hypotension          Bilateral lower extremity edema   Assessment & Plan     · wound care  · Compression bandages, compression stockings  · Elevate legs             COPD (chronic obstructive pulmonary disease) ([facility])   Assessment & Plan     · Stable  · Continue symbicort, spiriva, nebs  · Pt is on home o2= 2-3 liters NC  · Quit smoking 6 mos ago  · Follows outpt with Dr Krista Daigle          Type 2 diabetes mellitus with complication ([facility])   Assessment & Plan     · (+) hypoglycemia this am    · Decrease humulin R to 45 units with breakfast and lunch and 25 units with dinner  · Continue blood glucose monitor  · Insulin sliding scale as needed           Obesity (BMI 30-39  9)   Assessment & Plan     · Morbid obesity- BMI 39  · Lifestyle modification  · diet exercise and weight loss              VTE Pharmacologic Prophylaxis:   Pharmacologic: Heparin  Mechanical VTE Prophylaxis in Place: No      Current Length of Stay: 2 day(s)     Current Patient Status: Inpatient   Certification Statement: The patient will continue to require additional inpatient hospital stay due to iv diuretics           Discharge Plan:   Chris Garrison 53     CASE MANAGEMENT FOLLOWING CLOSELY FOR ALL DISCHARGE NEEDS           Cierra Sibley RN Registered Nurse Signed   Case Management Date of Service: 2/5/2018 12:57 PM         []Hide copied text     Thank you,  [address] [facility] in the [facility] by Onofre Carroll for 2017  Network Utilization Review Department  Phone: [phone]; Fax [phone]  ATTENTION: The Network Utilization Review Department is now centralized for our 7 Facilities  Make a note that we have a new phone and fax numbers for our Department  Please call with any questions or concerns to [phone] CFA carefully follow the prompts so that you are directed to the right person  All voicemails are confidential  Fax any determinations, approvals, denials, and requests for initial or continue stay review clinical to [phone]  Due to HIGH CALL volume, it would be easier if you could please send faxed requests to expedite your requests and in part, help us provide discharge notifications faster            Continued Stay Review     Date: 18 ACUTE MED SURG LEVEL OF CARE        Vital Signs: /86   Pulse 90   Temp 98 6 °F (37 °C)   Resp 18   Ht 6' (1 829 m)   Wt 125 kg (275 lb 12 8 oz)   SpO2 90%   BMI 37 41 kg/m²       Vitals:   Temp (24hrs), Av 2 °F (36 8 °C), Min:97 9 °F (36 6 °C), Max:98 5 °F (36 9 °C)   HR:  [70-89] 89  Resp:  [18-21] 20  BP: (119-158)/(57-79) 140/79  SpO2:  [92 %-96 %] 92 %  Body mass index is 37 41 kg/m²       Input and Output Summary (last 24 hours):   Last data filed at 18 0700    Gross per 24 hour   Intake             1320 ml   Output             4725 ml   Net            -3405 ml      Diet Mani/CHO Controlled; Consistent Carbohydrate Diet Level 2 (5 carb servings/75 grams CHO/meal);  Sodium 2 Gm        IV ACCESS     Medications:   Scheduled Meds:   Current Facility-Administered Medications:  acetaminophen 650 mg Oral Q4H PRN Drake Snow MD   acetaZOLAMIDE 250 mg Intravenous Q12H [address] Mamadou Brochure, DO   albuterol 2 5 mg Nebulization Q4H PRN Drake Snow MD   aspirin 81 mg Oral Daily Drake Snow MD   benzocaine   Mucosal 4x Daily PRN YOLANDE Calloway budesonide-formoterol 2 puff Inhalation BID Ilia Spears MD   cyclobenzaprine 10 mg Oral HS Ilia Spears MD   ferrous sulfate 325 mg Oral Daily With Breakfast Ilia Spears MD   fluticasone 1 spray Each Nare Daily Atiya Fernandez MD   furosemide 40 mg Intravenous BID Ilia Spears MD   gabapentin 300 mg Oral HS Ilia Spears MD   heparin (porcine) 5,000 Units Subcutaneous Q8H [facility] Ilia Spears MD   insulin lispro 1-5 Units Subcutaneous TID  Gretchen Meeks MD   insulin lispro 1-5 Units Subcutaneous HS Ilia Spears MD   insulin regular 25 Units Subcutaneous Before Dinner YOLANDE Bernabe   insulin regular 45 Units Subcutaneous BID before breakfast/lunch YOLANDE Rosenbaum   levalbuterol 1 25 mg Nebulization TID Atiya Fernandez MD   metoprolol tartrate 25 mg Oral Q12H [facility] Merlin Meeks MD   oxyCODONE 5 mg Oral Q4H PRN Ilia Spears MD   oxyCODONE 10 mg Oral Q4H PRN Ilia Spears MD   pantoprazole 40 mg Oral BID  Gretchen Meeks MD   potassium chloride 10 mEq Oral Daily Ilia Spears MD   pravastatin 40 mg Oral Daily With Elizabeth Khan MD   sodium chloride 3 mL Nebulization TID Atiya Fernandez MD   tiotropium 18 mcg Inhalation Daily Ilia Spears MD         PRN Meds:     acetaminophen    NEB Tx with albuterol 2 5 mg q4hrs prn given x 1/ 24 hrs    benzocaine    oxyCODONE 5 mg q4hrs prn given x 1/ 24 hrs    oxyCODONE 10 mg q4hrs prn given x 1/ 24 hrs        LABS/Diagnostic Results:  Results from last 7 days  Lab Units 02/05/18  0451   WBC Thousand/uL 11 46*   HEMOGLOBIN g/dL 13 2   HEMATOCRIT % 44 1   PLATELETS Thousands/uL 173   NEUTROS PCT % 75   LYMPHS PCT % 14   MONOS PCT % 9   EOS PCT % 2       Results from last 7 days  Lab Units 02/05/18  0451   SODIUM mmol/L 133*   POTASSIUM mmol/L 4 4   CHLORIDE mmol/L 92*   CO2 mmol/L 38*   BUN mg/dL 18   CREATININE mg/dL 1 44*   CALCIUM mg/dL 8 9   TOTAL PROTEIN g/dL 8 3*   BILIRUBIN TOTAL mg/dL 0 59   ALK PHOS U/L 72   ALT U/L 37   AST U/L 57*   GLUCOSE RANDOM mg/dL 116         Age/Sex: 81 y o  male      Assessment/Plan:          * Acute on chronic diastolic congestive heart failure (HCC)   Assessment & Plan     · noncompliant with Lasix prior to coming in   · 2kg change in weight since admission  · C/w lasix 40mg IV BID, additional Lasix 60mg IV once  · Cardiology following  · Monitor input output/daily weights  · Oxygen supplementation             Poor dentition   Assessment & Plan     · Poor oral hygiene  · Outpatient f/u with dentist after discharge           Non-ST elevation myocardial infarction (NSTEMI), type 2 ([facility] [address] )   Assessment & Plan     · History of CAD status post PCI with stents in RCA and diagonal 1 In the setting of heart failure on this admission  · Cardiology following, no further w/u          Chronic kidney disease, stage 3   Assessment & Plan     · Creat baseline appears to be ~1 2-1 4  · BUN/Cr- 18/1 44  · Continue IV diuretics  · Avoid any further nephrotoxic drugs or hypotension          Bilateral lower extremity edema   Assessment & Plan     · Legs continue to swollen due to fluid overloaded state  · Chronic venous stasis  · wound care  · Compression bandages, compression stockings  · Elevate legs             COPD (chronic obstructive pulmonary disease) (HCC)   Assessment & Plan     · Stable  · Continue symbicort, spiriva, nebs  · Pt is on home o2= 2-3 liters NC  · Quit smoking 6 mos ago  · Follows outpt with Dr Bernadine Chávez          Chronic respiratory failure with hypoxia (HCC)   Assessment & Plan     Secondary to COPD/CHF  Stable  Continue oxygen supplementation, bronchodilation          Venous stasis dermatitis of both lower extremities   Assessment & Plan     Continue venous compression stockings, keep legs clean and dry bilaterally          Type 2 diabetes mellitus with complication ([facility])   Assessment & Plan     · Chronic hyperglcemia  · humulin R to 45 units with breakfast and lunch and 25 units with dinner, ISS  · Continue blood glucose monitoring          Dyslipidemia   Assessment & Plan     Stable  Continue statin          Obesity (BMI 30-39  9)   Assessment & Plan     · Morbid obesity- BMI 39  · Lifestyle modification  · diet exercise and weight loss              VTE Pharmacologic Prophylaxis:   Pharmacologic: Heparin  Mechanical VTE Prophylaxis in Place: No, compression stockings present      Current Length of Stay: 4 day(s)     Current Patient Status: Inpatient   Certification Statement: The patient will continue to require additional inpatient hospital stay due to Medical management of acute Congestive heart failure exacerbation        Discharge Plan:   Chris Garrison 53     CASE MANAGEMENT FOLLOWING CLOSELY FOR ALL DISCHARGE NEEDS   Thank you,  [facility] in the [facility] by Onofre Carroll for 2017  Network Utilization Review Department  Phone: [phone]; Fax [phone]  ATTENTION: The Network Utilization Review Department is now centralized for our 7 Facilities  Make a note that we have a new phone and fax numbers for our Department  Please call with any questions or concerns to [phone] and carefully follow the prompts so that you are directed to the right person  All voicemails are confidential  Fax any determinations, approvals, denials, and requests for initial or continue stay review clinical to [phone]   Due to HIGH CALL volume, it would be easier if you could please send faxed requests to expedite your requests and in part, help us provide discharge notifications faster

## 2018-02-07 NOTE — PROGRESS NOTES
Cardiology Progress Note - Raeann George Sr  81 y o  male MRN: [id number]    Unit/Bed#: [facility] 530-01 Encounter: [id number]      Assessment:  Principal Problem:    Acute on chronic diastolic congestive heart failure ([address] )  Active Problems:    Obesity (BMI 30-39  9)    Dyslipidemia    Type 2 diabetes mellitus with complication (HCC)    Venous stasis dermatitis of both lower extremities    Chronic respiratory failure with hypoxia (HCC)    COPD (chronic obstructive pulmonary disease) ([facility])    Bilateral lower extremity edema    Chronic kidney disease, stage 3    Non-ST elevation myocardial infarction (NSTEMI), type 2 (HCC)    Poor dentition      Plan:  No significant issues overnight  Patient appears comfortable  He continues with supplemental oxygen with 3 L nasal cannula     Patient has a good diuresis with the current regimen  We will check a follow-up chest x-ray in reference to previously noted right pleural effusion  If there is improvement we will consider change in diuretic to oral therapy  His BMP demonstrates stable renal function with a potassium of 3 9  Subjective:   Patient seen and examined  No significant events overnight   negative  Objective:     Vitals: Blood pressure 133/69, pulse 78, temperature 98 1 °F (36 7 °C), temperature source Oral, resp  rate 20, height 6' (1 829 m), weight 124 kg (273 lb), SpO2 95 %  , Body mass index is 37 03 kg/m² , Orthostatic Blood Pressures    Flowsheet Row Most Recent Value   Blood Pressure  133/69 filed at 02/07/2018 0820   Patient Position - Orthostatic VS  Sitting filed at 02/06/2018 1500      ,      Intake/Output Summary (Last 24 hours) at 02/07/18 0851  Last data filed at 02/07/18 0508   Gross per 24 hour   Intake              400 ml   Output             1700 ml   Net            -1300 ml       No significant arrhythmias seen on telemetry review  Physical Exam:    GEN: Raeann George Sr    NECK: supple, no carotid bruits, no JVD or HJR  HEART: normal rate, regular rhythm, normal S1 and S2, no murmurs, clicks, gallops or rubs   LUNGS: clear to auscultation bilaterally; no wheezes, rales, or rhonchi   ABDOMEN: normal bowel sounds, soft, no tenderness, no distention  EXTREMITIES: edema  SKIN: warm and well perfused, no suspicious lesions on exposed skin    Labs & Results:    Admission on 02/01/2018   No results displayed because visit has over 200 results  Xr Chest 2 Views    Result Date: 2/1/2018  Narrative: CHEST INDICATION:  CHF, shortness of breath, crackles, diabetic patient with lower extremity wounds  COMPARISON:  10/31/2017 VIEWS:  Frontal and lateral projections IMAGES:  5 FINDINGS:     There is mild cardiomegaly  There is moderate right pleural effusion, increased compared with prior  There is some consolidation at the right lung base which is probably atelectasis or pneumonia  Unilateral edema less likely but not excluded  There seems to be some mediastinal shift towards the right which would indicate that there is probably significant volume loss in the right lower lobe  No pneumothorax  Osseous structures intact  Impression: Moderate right pleural effusion and consolidation of the right base which is probably mostly atelectasis  Pneumonia not entirely excluded  Cardiomegaly Rightward mediastinal shift Recommend continued follow-up Workstation performed: [id number]       EKG personally reviewed by Severo Crooked, MD      Counseling / Coordination of Care  Total floor / unit time spent today 30 minutes  Greater than 50% of total time was spent with the patient and / or family counseling and / or coordination of care

## 2018-02-07 NOTE — PLAN OF CARE
Problem: Potential for Falls  Goal: Patient will remain free of falls  INTERVENTIONS:  - Assess patient frequently for physical needs  -  Identify cognitive and physical deficits and behaviors that affect risk of falls  -  [place] fall precautions as indicated by assessment   - Educate patient/family on patient safety including physical limitations  - Instruct patient to call for assistance with activity based on assessment  - Modify environment to reduce risk of injury  - Consider OT/PT consult to assist with strengthening/mobility   Outcome: Progressing      Problem: CARDIOVASCULAR - ADULT  Goal: Maintains optimal cardiac output and hemodynamic stability  INTERVENTIONS:  - Monitor I/O, vital signs and rhythm  - Monitor for S/S and trends of decreased cardiac output i e  bleeding, hypotension  - Administer and titrate ordered vasoactive medications to optimize hemodynamic stability  - Assess quality of pulses, skin color and temperature  - Assess for signs of decreased coronary artery perfusion - ex   Angina  - Instruct patient to report change in severity of symptoms   Outcome: Progressing    Goal: Absence of cardiac dysrhythmias or at baseline rhythm  INTERVENTIONS:  - Continuous cardiac monitoring, monitor vital signs, obtain 12 lead EKG if indicated  - Administer antiarrhythmic and heart rate control medications as ordered  - Monitor electrolytes and administer replacement therapy as ordered   Outcome: Progressing      Problem: RESPIRATORY - ADULT  Goal: Achieves optimal ventilation and oxygenation  INTERVENTIONS:  - Assess for changes in respiratory status  - Assess for changes in mentation and behavior  - Position to facilitate oxygenation and minimize respiratory effort  - Oxygen administration by appropriate delivery method based on oxygen saturation (per order) or ABGs  - Initiate smoking cessation education as indicated  - Encourage broncho-pulmonary hygiene including cough, deep breathe, Incentive Spirometry  - Assess the need for suctioning and aspirate as needed  - Assess and instruct to report SOB or any respiratory difficulty  - Respiratory Therapy support as indicated   Outcome: Progressing      Problem: METABOLIC, FLUID AND ELECTROLYTES - ADULT  Goal: Electrolytes maintained within normal limits  INTERVENTIONS:  - Monitor labs and assess patient for signs and symptoms of electrolyte imbalances  - Administer electrolyte replacement as ordered  - Monitor response to electrolyte replacements, including repeat lab results as appropriate  - Instruct patient on fluid and nutrition as appropriate   Outcome: Progressing    Goal: Fluid balance maintained  INTERVENTIONS:  - Monitor labs and assess for signs and symptoms of volume excess or deficit  - Monitor I/O and WT  - Instruct patient on fluid and nutrition as appropriate   Outcome: Progressing    Goal: Glucose maintained within target range  INTERVENTIONS:  - Monitor Blood Glucose as ordered  - Assess for signs and symptoms of hyperglycemia and hypoglycemia  - Administer ordered medications to maintain glucose within target range  - Assess nutritional intake and initiate nutrition service referral as needed   Outcome: Progressing      Problem: SKIN/TISSUE INTEGRITY - ADULT  Goal: Skin integrity remains intact  INTERVENTIONS  - Identify patients at risk for skin breakdown  - Assess and monitor skin integrity  - Assess and monitor nutrition and hydration status  - Monitor labs (i e  albumin)  - Assess for incontinence   - Turn and reposition patient  - Assist with mobility/ambulation  - Relieve pressure over bony prominences  - Avoid friction and shearing  - Provide appropriate hygiene as needed including keeping skin clean and dry  - Evaluate need for skin moisturizer/barrier cream  - Collaborate with interdisciplinary team (i e  Nutrition, Rehabilitation, etc )   - Patient/family teaching   Outcome: Progressing    Goal: Incision(s), wounds(s) or drain site(s) healing without S/S of infection  INTERVENTIONS  - Assess and document risk factors for skin impairment   - Assess and document dressing, incision, wound bed, drain sites and surrounding tissue  - Initiate Nutrition services consult and/or wound management as needed   Outcome: Progressing    Goal: Oral mucous membranes remain intact  INTERVENTIONS  - Assess oral mucosa and hygiene practices  - Implement preventative oral hygiene regimen  - Implement oral medicated treatments as ordered  - Initiate Nutrition services referral as needed   Outcome: Progressing      Problem: PAIN - ADULT  Goal: Verbalizes/displays adequate comfort level or baseline comfort level  Interventions:  - Encourage patient to monitor pain and request assistance  - Assess pain using appropriate pain scale  - Administer analgesics based on type and severity of pain and evaluate response  - Implement non-pharmacological measures as appropriate and evaluate response  - Consider cultural and social influences on pain and pain management  - Notify physician/advanced practitioner if interventions unsuccessful or patient reports new pain   Outcome: Progressing      Problem: DISCHARGE PLANNING  Goal: Discharge to home or other facility with appropriate resources  INTERVENTIONS:  - Identify barriers to discharge w/patient and caregiver  - Arrange for needed discharge resources and transportation as appropriate  - Identify discharge learning needs (meds, wound care, etc )  - Arrange for interpretive services to assist at discharge as needed  - Refer to Case Management Department for coordinating discharge planning if the patient needs post-hospital services based on physician/advanced practitioner order or complex needs related to functional status, cognitive ability, or social support system   Outcome: Progressing      Problem: Knowledge Deficit  Goal: Patient/family/caregiver demonstrates understanding of disease process, treatment plan, medications, and discharge instructions  Complete learning assessment and assess knowledge base    Interventions:  - Provide teaching at level of understanding  - Provide teaching via preferred learning methods   Outcome: Progressing      Problem: DISCHARGE PLANNING - CARE MANAGEMENT  Goal: Discharge to post-acute care or home with appropriate resources  INTERVENTIONS:  - Conduct assessment to determine patient/family and health care team treatment goals, and need for post-acute services based on payer coverage, community resources, and patient preferences, and barriers to discharge  - Address psychosocial, clinical, and financial barriers to discharge as identified in assessment in conjunction with the patient/family and health care team  - Arrange appropriate level of post-acute services according to patient's   needs and preference and payer coverage in collaboration with the physician and health care team  - Communicate with and update the patient/family, physician, and health care team regarding progress on the discharge plan  - Arrange appropriate transportation to post-acute venues   Outcome: Progressing

## 2018-02-07 NOTE — PROGRESS NOTES
[facility] Internal Medicine Progress Note  Patient: Esdras Gupta  81 y o  male   MRN: [id number]  PCP: Leigha Ching MD  Unit/Bed#: [facility] 530-01 Encounter: [id number]  Date Of Visit: 02/07/18    Assessment:    Principal Problem:    Acute on chronic diastolic congestive heart failure ([address] )  Active Problems:    Obesity (BMI 30-39  9)    Dyslipidemia    Type 2 diabetes mellitus with complication (HCC)    Venous stasis dermatitis of both lower extremities    Chronic respiratory failure with hypoxia (HCC)    COPD (chronic obstructive pulmonary disease) (HCC)    Bilateral lower extremity edema    Chronic kidney disease, stage 3    Non-ST elevation myocardial infarction (NSTEMI), type 2 (HCC)    Poor dentition      Plan:    · Acute on chronic diastolic congestive heart failure continue IV Lasix 40 mg b i d  per Cardiology, less than 2 g salt diet  · NSTEMI 2  · Chronic hypoxic respiratory failure continue supplemental oxygen keep O2 sats more than 88%  · Chronic kidney disease stage 3 monitor kidney function closely  · COPD stable continue respiratory treatments  · Diabetes mellitus type 2 A1c 6 8 monitor Accu-Cheks  · Morbidly obese  · Out of bed as able ambulation as able       VTE Pharmacologic Prophylaxis:   Pharmacologic: Heparin  Mechanical VTE Prophylaxis in Place: Yes    Patient Centered Rounds: I have performed bedside rounds with nursing staff today  Discussions with Specialists or Other Care Team Provider:     Education and Discussions with Family / Patient: pt, called and left a voice message for wife for phone    Time Spent for Care: 20 minutes  More than 50% of total time spent on counseling and coordination of care as described above      Current Length of Stay: 6 day(s)    Current Patient Status: Inpatient   Certification Statement: The patient will continue to require additional inpatient hospital stay due to As mentioned    Discharge Plan / Estimated Discharge Date:  CHF requiring IV medications as outlined    Code Status: Level 1 - Full Code      Subjective:     Comfortably lying in bed  Reports improving lower extremity swelling      Objective:     Vitals:   Temp (24hrs), Av °F (36 7 °C), Min:97 7 °F (36 5 °C), Max:98 2 °F (36 8 °C)    HR:  [72-90] 72  Resp:  [18-20] 18  BP: (128-164)/(59-71) 128/59  SpO2:  [95 %-98 %] 98 %  Body mass index is 37 03 kg/m²  Input and Output Summary (last 24 hours): Intake/Output Summary (Last 24 hours) at 18 1552  Last data filed at 18 1426   Gross per 24 hour   Intake             1180 ml   Output             2175 ml   Net             -995 ml       Physical Exam:     Physical Exam     Comfortably lying in bed  Neck supple  Lungs diminished breath sounds at the bases  Heart sounds distant S1 and S2 noted no murmurs appreciable  Abdomen soft nontender  Pulses present  Pedal edema noted  Awake obeys simple commands  No rash      Additional Data:     Labs:      Results from last 7 days  Lab Units 18  0451   WBC Thousand/uL 11 46*   HEMOGLOBIN g/dL 13 2   HEMATOCRIT % 44 1   PLATELETS Thousands/uL 173   NEUTROS PCT % 75   LYMPHS PCT % 14   MONOS PCT % 9   EOS PCT % 2       Results from last 7 days  Lab Units 18  0509  18  0451   SODIUM mmol/L 136  < > 133*   POTASSIUM mmol/L 3 9  < > 4 4   CHLORIDE mmol/L 95*  < > 92*   CO2 mmol/L 35*  < > 38*   BUN mg/dL 25  < > 18   CREATININE mg/dL 1 46*  < > 1 44*   CALCIUM mg/dL 8 8  < > 8 9   TOTAL PROTEIN g/dL  --   --  8 3*   BILIRUBIN TOTAL mg/dL  --   --  0 59   ALK PHOS U/L  --   --  72   ALT U/L  --   --  37   AST U/L  --   --  57*   GLUCOSE RANDOM mg/dL 166*  < > 116   < > = values in this interval not displayed  * I Have Reviewed All Lab Data Listed Above  * Additional Pertinent Lab Tests Reviewed:  All Labs Within Last 24 Hours Reviewed    Imaging:    Imaging Reports Reviewed Today Include:   Imaging Personally Reviewed by Myself Includes:     Recent Cultures (last 7 days): Last 24 Hours Medication List:     Current Facility-Administered Medications:  acetaminophen 325 mg Oral BID Kaycee Pierce MD   acetaZOLAMIDE 250 mg Intravenous Q12H [address] Rody Yoon DO   albuterol 2 5 mg Nebulization Q4H PRN Enrrique Mae MD   aspirin 81 mg Oral Daily Enrrique Mae MD   benzocaine  Mucosal 4x Daily PRN YOALNDE Velarde   budesonide-formoterol 2 puff Inhalation BID Enrrique Mae MD   cyclobenzaprine 10 mg Oral BID Kim Garcia PA-C   ferrous sulfate 325 mg Oral Daily With Breakfast Enrrique Mae MD   fluticasone 1 spray Each Nare Daily Kaycee Pierce MD   furosemide 40 mg Intravenous BID Enrrique Mae MD   gabapentin 300 mg Oral TID Kim Garcia PA-C   heparin (porcine) 5,000 Units Subcutaneous Q8H [address] Enrrique Mae MD   insulin lispro 1-5 Units Subcutaneous TID Martell Jamessahra Meeks MD   insulin lispro 1-5 Units Subcutaneous HS Enrrique Mae MD   insulin regular 25 Units Subcutaneous Before Carmen Makayla, YOLANDE   insulin regular 45 Units Subcutaneous BID before breakfast/lunch Rodriguez RumpfYOLANDE   levalbuterol 1 25 mg Nebulization TID Kaycee Pierce MD   metoprolol tartrate 25 mg Oral Q12H [address] MD Jeanna   oxyCODONE 5 mg Oral Q4H PRN Enrrique Mae MD   oxyCODONE 7 5 mg Oral BID Kim Garcia PA-C   oxyCODONE 10 mg Oral Q4H PRN Enrrique Mae MD   pantoprazole 40 mg Oral BID  Ingrid Meeks MD   potassium chloride 20 mEq Oral BID Severo Crooked, MD   pravastatin 40 mg Oral Daily With Rexine Leisure MD Jeanna   sodium chloride 3 mL Nebulization TID Kaycee Pierce MD   tiotropium 18 mcg Inhalation Daily Enrrique Mae MD        Today, Patient Was Seen By: Leland Steward MD    ** Please Note: This note has been constructed using a voice recognition system   **

## 2018-02-07 NOTE — CASE MANAGEMENT
Thank you,  [address]  [facility] in the [facility] by Onofre Carroll for 2017  Network Utilization Review Department  Phone: [phone]; Fax [phone]  ATTENTION: The Network Utilization Review Department is now centralized for our 7 Facilities  Make a note that we have a new phone and fax numbers for our Department  Please call with any questions or concerns to [phone] [employer] carefully follow the prompts so that you are directed to the right person  All voicemails are confidential  Fax any determinations, approvals, denials, and requests for initial or continue stay review clinical to [phone]  Due to HIGH CALL volume, it would be easier if you could please send faxed requests to expedite your requests and in part, help us provide discharge notifications faster            Continued Stay Review     Date:  18Wednesday ACUTE MED SURG LEVEL OF CARE        Vital Signs: /69   Pulse 78   Temp 98 1 °F (36 7 °C) (Oral)   Resp 20   Ht 6' (1 829 m)   Wt 124 kg (273 lb)   SpO2 95%   BMI 37 03 kg/m²      Temp (24hrs), Av 1 °F (36 7 °C), Min:97 5 °F (36 4 °C), Max:98 5 °F (36 9 °C)   HR:  [77-94] 88  Resp:  [18-20] 18  BP: (128-155)/(70-82) 128/72  SpO2:  [92 %-98 %] 95 %  Body mass index is 35 88 kg/m²       Input and Output Summary (last 24 hours):   Last data filed at 18 1316    Gross per 24 hour   Intake              800 ml   Output             2600 ml   Net            -1800 ml        Diet Mani/CHO Controlled; Consistent Carbohydrate Diet Level 2 (5 carb servings/75 grams CHO/meal);  Sodium 2 Gm        IV ACCESS       Medications:   Scheduled Meds:   Current Facility-Administered Medications:  acetaminophen 325 mg Oral BID Marlene Ludwig MD   acetaZOLAMIDE 250 mg Intravenous Q12H [address] Red[place] DO August   albuterol 2 5 mg Nebulization Q4H PRN Paulette Saint, MD   aspirin 81 mg Oral Daily Paulette Saint, MD   benzocaine  Mucosal 4x Daily PRN Gutierrez Poseyolla YOLANDE Hill   budesonide-formoterol 2 puff Inhalation BID Kellen Collins MD   cyclobenzaprine 10 mg Oral BID Adrianna ConchitaRICK prince   ferrous sulfate 325 mg Oral Daily With Breakfast Kellen Collins MD   fluticasone 1 spray Each Nare Daily Radhika Kruse MD   furosemide 40 mg Intravenous BID Kellen Collins MD   gabapentin 300 mg Oral TID Adrianna ConchitaRICK prince   heparin (porcine) 5,000 Units Subcutaneous Q8H [facility] Kellen Collins MD   insulin lispro 1-5 Units Subcutaneous TID Mitzi Meeks MD   insulin lispro 1-5 Units Subcutaneous HS Kellen Collins MD   insulin regular 25 Units Subcutaneous Before Donata Baston, YOLANDE   insulin regular 45 Units Subcutaneous BID before breakfast/lunch YOLANDE Mathews   levalbuterol 1 25 mg Nebulization TID Radhika Kruse MD   metoprolol tartrate 25 mg Oral Q12H [address] Kellen Collins MD   oxyCODONE 5 mg Oral Q4H PRN Kellen Collins MD   oxyCODONE 7 5 mg Oral BID Adrianna ConchitaRICK   oxyCODONE 10 mg Oral Q4H PRN Kellen Collins MD   pantoprazole 40 mg Oral BID AC Kellen Collins MD   potassium chloride 20 mEq Oral BID Kina Grant MD   pravastatin 40 mg Oral Daily With Maria Ines Erwin MD   sodium chloride 3 mL Nebulization TID Radhika Kruse MD   tiotropium 18 mcg Inhalation Daily Kellen Collins MD     PRN Meds:      albuterol    benzocaine    oxyCODONE       LABS/Diagnostic Results:   Results from last 7 days  Lab Units 02/05/18  0451   WBC Thousand/uL 11 46*   HEMOGLOBIN g/dL 13 2   HEMATOCRIT % 44 1   PLATELETS Thousands/uL 173   NEUTROS PCT % 75   LYMPHS PCT % 14   MONOS PCT % 9   EOS PCT % 2       Results from last 7 days  Lab Units 02/06/18  0500 02/05/18  0451   SODIUM mmol/L 135* 133*   POTASSIUM mmol/L 3 5 4 4   CHLORIDE mmol/L 93* 92*   CO2 mmol/L 34* 38*   BUN mg/dL 23 18   CREATININE mg/dL 1 47* 1 44*   CALCIUM mg/dL 8 6 8 9   TOTAL PROTEIN g/dL  --  8 3*   BILIRUBIN TOTAL mg/dL  --  0 59   ALK PHOS U/L  -- 72   ALT U/L  --  37   AST U/L  --  57*   GLUCOSE RANDOM mg/dL 226* 116       Age/Sex: 81 y o  male     Assessment/Plan:    Assessment:   Principal Problem:    Acute on chronic diastolic congestive heart failure (HCC)  Active Problems:    Obesity (BMI 30-39  9)    Dyslipidemia    Type 2 diabetes mellitus with complication (HCC)    Venous stasis dermatitis of both lower extremities    Chronic respiratory failure with hypoxia ([facility])    COPD (chronic obstructive pulmonary disease) ([facility])    Bilateral lower extremity edema    Chronic kidney disease, stage 3    Non-ST elevation myocardial infarction (NSTEMI), type 2 (HCC)    Poor dentition      Plan:   · Acute on chronic diastolic congestive heart failure on IV Lasix, less than 2 g salt diet fluid restriction monitor kidney function, electrolytes, cardiology input noted  · NSTEMI 2 likely due to 1 above  · Chronic hypoxic respiratory failure continue supplemental oxygen to keep O2 sats more than 88%  · Chronic kidney disease stage 3 monitor kidney function closely, avoid nephrotoxins  · COPD stable, continue respiratory treatments  · Diabetes mellitus type 2 A1c 6 8 on 11/01/2017 continue insulin monitor Accu-Cheks  · Morbid obesity  · Out of bed as able        VTE Pharmacologic Prophylaxis:   Pharmacologic: Heparin  Mechanical VTE Prophylaxis in Place:  Yes     Current Patient Status: Inpatient   Certification Statement: The patient will continue to require additional inpatient hospital stay          Discharge Plan:   ANTICIPATE DISCHARGE HOME WHEN MEDICALLY CLEARED     CASE MANAGEMENT FOLLOWING CLOSELY FOR ALL DISCHARGE NEEDS

## 2018-02-08 PROBLEM — J96.12 CHRONIC RESPIRATORY FAILURE WITH HYPOXIA AND HYPERCAPNIA (HCC): Chronic | Status: ACTIVE | Noted: 2017-07-23

## 2018-02-08 LAB
ANION GAP SERPL CALCULATED.3IONS-SCNC: 3 MMOL/L (ref 4–13)
ARTERIAL PATENCY WRIST A: YES
BASE EXCESS BLDA CALC-SCNC: 4.4 MMOL/L
BUN SERPL-MCNC: 26 MG/DL (ref 5–25)
CALCIUM SERPL-MCNC: 8.6 MG/DL (ref 8.3–10.1)
CHLORIDE SERPL-SCNC: 97 MMOL/L (ref 100–108)
CO2 SERPL-SCNC: 34 MMOL/L (ref 21–32)
CREAT SERPL-MCNC: 1.35 MG/DL (ref 0.6–1.3)
GFR SERPL CREATININE-BSD FRML MDRD: 53 ML/MIN/1.73SQ M
GLUCOSE SERPL-MCNC: 162 MG/DL (ref 65–140)
GLUCOSE SERPL-MCNC: 186 MG/DL (ref 65–140)
GLUCOSE SERPL-MCNC: 221 MG/DL (ref 65–140)
GLUCOSE SERPL-MCNC: 222 MG/DL (ref 65–140)
GLUCOSE SERPL-MCNC: 378 MG/DL (ref 65–140)
HCO3 BLDA-SCNC: 34 MMOL/L (ref 22–28)
NASAL CANNULA: 3
O2 CT BLDA-SCNC: 17 ML/DL (ref 16–23)
OXYHGB MFR BLDA: 92.6 % (ref 94–97)
PCO2 BLDA: 78.5 MM HG (ref 36–44)
PH BLDA: 7.25 [PH] (ref 7.35–7.45)
PO2 BLDA: 75.9 MM HG (ref 75–129)
POTASSIUM SERPL-SCNC: 3.7 MMOL/L (ref 3.5–5.3)
SODIUM SERPL-SCNC: 134 MMOL/L (ref 136–145)
SPECIMEN SOURCE: ABNORMAL

## 2018-02-08 PROCEDURE — 94760 N-INVAS EAR/PLS OXIMETRY 1: CPT

## 2018-02-08 PROCEDURE — G8978 MOBILITY CURRENT STATUS: HCPCS

## 2018-02-08 PROCEDURE — 94640 AIRWAY INHALATION TREATMENT: CPT

## 2018-02-08 PROCEDURE — 97163 PT EVAL HIGH COMPLEX 45 MIN: CPT

## 2018-02-08 PROCEDURE — 99232 SBSQ HOSP IP/OBS MODERATE 35: CPT | Performed by: INTERNAL MEDICINE

## 2018-02-08 PROCEDURE — 36600 WITHDRAWAL OF ARTERIAL BLOOD: CPT

## 2018-02-08 PROCEDURE — 82948 REAGENT STRIP/BLOOD GLUCOSE: CPT

## 2018-02-08 PROCEDURE — G8979 MOBILITY GOAL STATUS: HCPCS

## 2018-02-08 PROCEDURE — 82805 BLOOD GASES W/O2 SATURATION: CPT | Performed by: INTERNAL MEDICINE

## 2018-02-08 PROCEDURE — 80048 BASIC METABOLIC PNL TOTAL CA: CPT | Performed by: INTERNAL MEDICINE

## 2018-02-08 PROCEDURE — 99231 SBSQ HOSP IP/OBS SF/LOW 25: CPT | Performed by: INTERNAL MEDICINE

## 2018-02-08 PROCEDURE — 99222 1ST HOSP IP/OBS MODERATE 55: CPT | Performed by: PSYCHIATRY & NEUROLOGY

## 2018-02-08 RX ORDER — FUROSEMIDE 40 MG/1
40 TABLET ORAL
Status: DISCONTINUED | OUTPATIENT
Start: 2018-02-08 | End: 2018-02-12 | Stop reason: HOSPADM

## 2018-02-08 RX ADMIN — ISODIUM CHLORIDE 3 ML: 0.03 SOLUTION RESPIRATORY (INHALATION) at 13:39

## 2018-02-08 RX ADMIN — GABAPENTIN 300 MG: 300 CAPSULE ORAL at 21:39

## 2018-02-08 RX ADMIN — LEVALBUTEROL HYDROCHLORIDE 1.25 MG: 1.25 SOLUTION, CONCENTRATE RESPIRATORY (INHALATION) at 13:38

## 2018-02-08 RX ADMIN — POTASSIUM CHLORIDE 20 MEQ: 1500 TABLET, EXTENDED RELEASE ORAL at 18:00

## 2018-02-08 RX ADMIN — CYCLOBENZAPRINE HYDROCHLORIDE 10 MG: 10 TABLET, FILM COATED ORAL at 08:06

## 2018-02-08 RX ADMIN — POTASSIUM CHLORIDE 20 MEQ: 1500 TABLET, EXTENDED RELEASE ORAL at 08:07

## 2018-02-08 RX ADMIN — CYCLOBENZAPRINE HYDROCHLORIDE 10 MG: 10 TABLET, FILM COATED ORAL at 18:00

## 2018-02-08 RX ADMIN — LEVALBUTEROL HYDROCHLORIDE 1.25 MG: 1.25 SOLUTION, CONCENTRATE RESPIRATORY (INHALATION) at 19:19

## 2018-02-08 RX ADMIN — WATER 10 ML: 1 INJECTION INTRAMUSCULAR; INTRAVENOUS; SUBCUTANEOUS at 21:43

## 2018-02-08 RX ADMIN — OXYCODONE HYDROCHLORIDE 7.5 MG: 5 TABLET ORAL at 18:01

## 2018-02-08 RX ADMIN — ACETAZOLAMIDE 250 MG: 500 INJECTION, POWDER, LYOPHILIZED, FOR SOLUTION INTRAVENOUS at 21:39

## 2018-02-08 RX ADMIN — INSULIN LISPRO 4 UNITS: 100 INJECTION, SOLUTION INTRAVENOUS; SUBCUTANEOUS at 11:25

## 2018-02-08 RX ADMIN — ISODIUM CHLORIDE 3 ML: 0.03 SOLUTION RESPIRATORY (INHALATION) at 19:19

## 2018-02-08 RX ADMIN — HEPARIN SODIUM 5000 UNITS: 5000 INJECTION, SOLUTION INTRAVENOUS; SUBCUTANEOUS at 06:11

## 2018-02-08 RX ADMIN — ACETAMINOPHEN 325 MG: 325 TABLET, FILM COATED ORAL at 08:07

## 2018-02-08 RX ADMIN — METOPROLOL TARTRATE 25 MG: 25 TABLET ORAL at 21:39

## 2018-02-08 RX ADMIN — FUROSEMIDE 40 MG: 40 TABLET ORAL at 18:00

## 2018-02-08 RX ADMIN — HEPARIN SODIUM 5000 UNITS: 5000 INJECTION, SOLUTION INTRAVENOUS; SUBCUTANEOUS at 13:21

## 2018-02-08 RX ADMIN — WATER: 1 INJECTION INTRAMUSCULAR; INTRAVENOUS; SUBCUTANEOUS at 13:15

## 2018-02-08 RX ADMIN — INSULIN LISPRO 1 UNITS: 100 INJECTION, SOLUTION INTRAVENOUS; SUBCUTANEOUS at 08:04

## 2018-02-08 RX ADMIN — INSULIN HUMAN 45 UNITS: 500 INJECTION, SOLUTION SUBCUTANEOUS at 08:03

## 2018-02-08 RX ADMIN — OXYCODONE HYDROCHLORIDE 7.5 MG: 5 TABLET ORAL at 08:06

## 2018-02-08 RX ADMIN — GABAPENTIN 300 MG: 300 CAPSULE ORAL at 18:01

## 2018-02-08 RX ADMIN — FUROSEMIDE 40 MG: 10 INJECTION, SOLUTION INTRAMUSCULAR; INTRAVENOUS at 08:07

## 2018-02-08 RX ADMIN — METOPROLOL TARTRATE 25 MG: 25 TABLET ORAL at 08:06

## 2018-02-08 RX ADMIN — INSULIN HUMAN 25 UNITS: 500 INJECTION, SOLUTION SUBCUTANEOUS at 18:03

## 2018-02-08 RX ADMIN — Medication 325 MG: at 08:06

## 2018-02-08 RX ADMIN — INSULIN LISPRO 2 UNITS: 100 INJECTION, SOLUTION INTRAVENOUS; SUBCUTANEOUS at 21:39

## 2018-02-08 RX ADMIN — GABAPENTIN 300 MG: 300 CAPSULE ORAL at 08:07

## 2018-02-08 RX ADMIN — ISODIUM CHLORIDE 3 ML: 0.03 SOLUTION RESPIRATORY (INHALATION) at 07:21

## 2018-02-08 RX ADMIN — PRAVASTATIN SODIUM 40 MG: 40 TABLET ORAL at 18:00

## 2018-02-08 RX ADMIN — HEPARIN SODIUM 5000 UNITS: 5000 INJECTION, SOLUTION INTRAVENOUS; SUBCUTANEOUS at 21:38

## 2018-02-08 RX ADMIN — INSULIN LISPRO 1 UNITS: 100 INJECTION, SOLUTION INTRAVENOUS; SUBCUTANEOUS at 18:01

## 2018-02-08 RX ADMIN — ASPIRIN 81 MG: 81 TABLET, COATED ORAL at 08:07

## 2018-02-08 RX ADMIN — PANTOPRAZOLE SODIUM 40 MG: 40 TABLET, DELAYED RELEASE ORAL at 18:01

## 2018-02-08 RX ADMIN — TIOTROPIUM BROMIDE 18 MCG: 18 CAPSULE ORAL; RESPIRATORY (INHALATION) at 08:11

## 2018-02-08 RX ADMIN — INSULIN HUMAN 45 UNITS: 500 INJECTION, SOLUTION SUBCUTANEOUS at 11:25

## 2018-02-08 RX ADMIN — ACETAMINOPHEN 325 MG: 325 TABLET, FILM COATED ORAL at 18:00

## 2018-02-08 RX ADMIN — LEVALBUTEROL HYDROCHLORIDE 1.25 MG: 1.25 SOLUTION, CONCENTRATE RESPIRATORY (INHALATION) at 07:21

## 2018-02-08 RX ADMIN — PANTOPRAZOLE SODIUM 40 MG: 40 TABLET, DELAYED RELEASE ORAL at 06:11

## 2018-02-08 RX ADMIN — BUDESONIDE AND FORMOTEROL FUMARATE DIHYDRATE 2 PUFF: 160; 4.5 AEROSOL RESPIRATORY (INHALATION) at 09:00

## 2018-02-08 RX ADMIN — ACETAZOLAMIDE 250 MG: 500 INJECTION, POWDER, LYOPHILIZED, FOR SOLUTION INTRAVENOUS at 11:24

## 2018-02-08 RX ADMIN — BUDESONIDE AND FORMOTEROL FUMARATE DIHYDRATE 2 PUFF: 160; 4.5 AEROSOL RESPIRATORY (INHALATION) at 18:01

## 2018-02-08 NOTE — PLAN OF CARE
CARDIOVASCULAR - ADULT     Maintains optimal cardiac output and hemodynamic stability Progressing     Absence of cardiac dysrhythmias or at baseline rhythm Progressing        DISCHARGE PLANNING     Discharge to home or other facility with appropriate resources Progressing        DISCHARGE PLANNING - CARE MANAGEMENT     Discharge to post-acute care or home with appropriate resources Progressing        Knowledge Deficit     Patient/family/caregiver demonstrates understanding of disease process, treatment plan, medications, and discharge instructions Progressing        METABOLIC, FLUID AND ELECTROLYTES - ADULT     Electrolytes maintained within normal limits Progressing     Fluid balance maintained Progressing     Glucose maintained within target range Progressing        PAIN - ADULT     Verbalizes/displays adequate comfort level or baseline comfort level Progressing        Potential for Falls     Patient will remain free of falls Progressing        RESPIRATORY - ADULT     Achieves optimal ventilation and oxygenation Progressing        SKIN/TISSUE INTEGRITY - ADULT     Skin integrity remains intact Progressing     Incision(s), wounds(s) or drain site(s) healing without S/S of infection Progressing     Oral mucous membranes remain intact Progressing

## 2018-02-08 NOTE — PHYSICAL THERAPY NOTE
Physical Therapy Evaluation    Patient's Name: Pablito Booth  Admitting Diagnosis  CHF (congestive heart failure) ([facility]) [I50 9]  History of coronary artery disease [Z86 79]  NSTEMI (non-ST elevated myocardial infarction) ([facility] [address] ) [I21 4]  Acute on chronic diastolic congestive heart failure (HCC) [I50 33]  Bilateral lower extremity edema [R60 0]  Venous stasis dermatitis of both lower extremities [I87 2]  Stage 3 chronic kidney disease [N18 3]    Problem List  Patient Active Problem List   Diagnosis    Obesity (BMI 30-39  9)    Dyslipidemia    Type 2 diabetes mellitus with complication (HCC)    Venous stasis dermatitis of both lower extremities    Chronic respiratory failure with hypoxia and hypercapnia (HCC)    COPD (chronic obstructive pulmonary disease) (HCC)    Bilateral lower extremity edema    Chronic kidney disease, stage 3    Acute on chronic diastolic congestive heart failure (HCC)    Non-ST elevation myocardial infarction (NSTEMI), type 2 (HCC)    Poor dentition       Past Medical History  Past Medical History:   Diagnosis Date    Abdominal pain     Cardiac disease     COPD (chronic obstructive pulmonary disease) ([facility])     Coronary artery disease     [facility]     Hyperlipidemia     Hypertension     MI (myocardial infarction)     with 3 stents    Prostate cancer [facility])        Past Surgical History  Past Surgical History:   Procedure Laterality Date    ABDOMINAL SURGERY      exploratory    ANGIOPLASTY      3 stents    ESOPHAGOGASTRODUODENOSCOPY N/A 10/2/2017    Procedure: ESOPHAGOGASTRODUODENOSCOPY (EGD); Surgeon: Winnie Sinha MD;  Location: [facility];   Service: Gastroenterology    PROSTATE SURGERY          02/08/18 8738   Note Type   Note type Eval only   Pain Assessment   Pain Assessment FLACC   Pain Type Chronic pain   Pain Location Back   Pain Orientation Lower   Pain Descriptors Aching;Discomfort   Pain Frequency Intermittent   Pain Onset Ongoing   Clinical Progression Not changed   Effect of Pain on Daily Activities discomfort during amb   Patient's Stated Pain Goal No pain   Hospital Pain Intervention(s) Repositioned; Ambulation/increased activity; Distraction; Emotional support   Response to Interventions comfortable when back in bed   Pain Rating: FLACC (Rest) - Face 0   Pain Rating: FLACC (Rest) - Legs 0   Pain Rating: FLACC (Rest) - Activity 0   Pain Rating: FLACC (Rest) - Cry 1   Pain Rating: FLACC (Rest) - Consolability 0   Score: FLACC (Rest) 1   Pain Rating: FLACC (Activity) - Face 0   Pain Rating: FLACC (Activity) - Legs 0   Pain Rating: FLACC (Activity) - Activity 0   Pain Rating: FLACC (Activity) - Cry 1   Pain Rating: FLACC (Activity) - Consolability 1   Score: FLACC (Activity) 2   Home Living   Type of Home House  (no VINI)   Home Layout Two level  (13 steps w/ hand rail up to bathroom )   [address]; Wheelchair-manual;Electric scooter   Additional Comments reports he stays on the 1st floor unless needs to go to the    Prior Function   Level of [place] Independent with ADLs and functional mobility  (limited amb w/ SPC inside and uses scooter outside)   Lives With Spouse; Son   Joseluis Help From Family   Comments uses O2 at home   Restrictions/Precautions   Braces or Orthoses (denies at baseline)   Other Precautions Fall Risk;O2;Cardiac/sternal;Hard of hearing   General   Additional Pertinent History cleared for assessment (spoke to nsg)   Cognition   Overall Cognitive Status WFL   Arousal/Participation Cooperative   Orientation Level Oriented to person;Oriented to place;Oriented to situation   Memory Within functional limits   Following Commands Follows one step commands without difficulty   Comments Pt is resting in bed; arousable; somewhat flat affect; reports he has been moving in the room; agreeable to demonstrate mobility skills; noted occasional resting tremors (B) UE   RUE Assessment   RUE Assessment WFL  (AROM)   LUE Assessment   LUE Assessment WFL  (AROM)   RLE Assessment   RLE Assessment WFL  (AROM)   Strength RLE   RLE Overall Strength 4-/5   LLE Assessment   LLE Assessment WFL  (AROM)   Strength LLE   LLE Overall Strength 4-/5   Bed Mobility   Supine to Sit 6  Modified independent   Sit to Supine 6  Modified independent   Additional Comments pillows placed for (B) UE and (R) UE support; call bell w/in reach   Transfers   Sit to Stand 5  Supervision   Additional items Assist x 1;Verbal cues   Stand to Sit 5  Supervision   Additional items Assist x 1;Verbal cues   Ambulation/Elevation   Gait pattern Excessively slow; Short stride; Wide OBEY; Inconsistent george  (3 episodes of partial knees buckling; upper body tremors )   Gait Assistance 4  Minimal assist  (initially; progressed to mod (A) due to increased tremors)   Additional items Assist x 1  (chair follow; additional stand by (A)of 2 more staff 2nd amb)   Assistive Device Bariatric Rolling walker   Distance 60 ft +40 ft w/ sitted rest period in between; noted intermittent upper body tremors during amb w/ a few episodes of partial knees buckling; RN aware and MD informed (? etiology; r/o neuro consult)   Stair Management Assistance (not appropriate at this time)   Balance   Static Sitting Good   Dynamic Sitting Fair +   Static Standing Fair -  (supported)   Ambulatory Poor +  (w/ rw)   Activity Tolerance   Activity Tolerance Patient limited by fatigue;Treatment limited secondary to medical complications (Comment)  (O2 sat in mid 80s % post amb --> achieved 93-94 % w/in 1 min)   Medical Staff Made Aware spoke to Dr Kaylee Hilario spoke to [facility], RN   Assessment   Prognosis Good   Problem List Decreased strength;Decreased endurance; Impaired balance;Decreased mobility; Decreased safety awareness; Obesity   Assessment Pt is 81 y o  admitted with hx of SOB and Dx of Acute on chronic diastolic congestive heart failure; NSTEMI, venous stasis dermatitis of both lower extremities   Pt 's comorbidities affecting POC include: COPD (uses O2 at home), CAD, HTN, MI, and CKD and personal factors of: steps in the house and somewhat limited mobility at home as per pt  Pt's clinical presentation is currently unstable/unpredictable which is evident in abn lab values being monitored, noted UE tremors in sitting progressing to upper body shaking and LE buckling while amb, decreased O2 sat post mobilization while remaining on suppl O2 and fall risk (RN and MD informed)  Pt presents w/ general weakness, deconditioning, decreased functional endurance, impaired standing balance and mod gait dysfunction  Will cont to follow pt in PT for progressive mobilization to address above functional deficits and to max level of (I), endurance, and safety  Currently recommend rehab upon D/C to regain premorbid level of (I); pt informed  Will cont to follow until then  Barriers to Discharge (multiple steps in the house)   Goals   Patient Goals none expressed   STG Expiration Date 02/18/18   Short Term Goal #1 7-10 days  Pt will amb 100 ft w/ rw <--> SPC, mod (I) and O2 sat remaining > 90 % on suppl O2  Pt will negotiate 13 steps w/ hand rail and SPC, mod (I)  Pt will achieve mod (I) level w/ transfers  Plan   Treatment/Interventions Functional transfer training;LE strengthening/ROM; Elevations; Therapeutic exercise; Endurance training;Gait training;Spoke to MD;Spoke to nursing   PT Frequency 5x/wk   Recommendation   Recommendation Post acute IP rehab   Equipment Recommended Walker  (at this time)   Modified [place] Scale   Modified [place] Scale 4   Barthel Index   Feeding 10   Bathing 5   Grooming Score 5   Dressing Score 10   Bladder Score 10   Bowels Score 10   Toilet Use Score 10   Transfers (Bed/Chair) Score 10   Mobility (Level Surface) Score 0   Stairs Score 0   Barthel Index Score 70         Benjamin Nelson, PT

## 2018-02-08 NOTE — NUTRITION
02/08/18 1354   Recommendations/Interventions   Interventions Education initiated/completed   Nutrition Recommendations Continue diet as ordered;Lab - consider order (specify)  (Rec: check current HgA1C)

## 2018-02-08 NOTE — PROGRESS NOTES
[facility] Internal Medicine Progress Note  Patient: Lauryn Fuller  81 y o  male   MRN: [id number]  PCP: Rao Harrington MD  Unit/Bed#: [facility] 530-01 Encounter: [id number]  Date Of Visit: 02/08/18    Assessment:    Principal Problem:    Acute on chronic diastolic congestive heart failure ([address] )  Active Problems:    Obesity (BMI 30-39  9)    Dyslipidemia    Type 2 diabetes mellitus with complication (HCC)    Venous stasis dermatitis of both lower extremities    Chronic respiratory failure with hypoxia and hypercapnia (HCC)    COPD (chronic obstructive pulmonary disease) (HCC)    Bilateral lower extremity edema    Chronic kidney disease, stage 3    Non-ST elevation myocardial infarction (NSTEMI), type 2 (HCC)    Poor dentition      Plan:    · Acute on chronic diastolic congestive heart failure now on oral diuretics , cardiology input noted less than 2 g salt diet  · NSTEMI 2  · Chronic hypoxic respiratory failure continue supplemental oxygen  · Chronic kidney disease stage 3  · COPD stable continue respiratory treatments  · Likely chronic hypercapnic respiratory failure -  likely has undiagnosed obstructive sleep apnea , outpatient sleep study recommended  · Diabetes mellitus type 2 A1c 6 8 monitor Accu-Cheks  · Morbidly obese  · Out of bed as able       VTE Pharmacologic Prophylaxis:   Pharmacologic: Heparin  Mechanical VTE Prophylaxis in Place: Yes    Patient Centered Rounds: I have performed bedside rounds with nursing staff today  Discussions with Specialists or Other Care Team Provider:     Education and Discussions with Family / Patient: pt, called and left a message for wife Bird Sit over phone    Time Spent for Care: 20 minutes  More than 50% of total time spent on counseling and coordination of care as described above      Current Length of Stay: 7 day(s)    Current Patient Status: Inpatient   Certification Statement: The patient will continue to require additional inpatient hospital stay due to As mentioned    Discharge Plan / Estimated Discharge Date:  Medically stable for discharge, discussed with case management pending placement    Code Status: Level 1 - Full Code      Subjective:     Reports feeling better  Dyspnea improved  Lower extremity swelling improved  Appetite good      Objective:     Vitals:   Temp (24hrs), Av °F (36 7 °C), Min:98 °F (36 7 °C), Max:98 1 °F (36 7 °C)    HR:  [70-78] 78  Resp:  [18-20] 20  BP: (138-145)/(56-77) 145/65  SpO2:  [91 %-100 %] 95 %  Body mass index is 37 14 kg/m²  Input and Output Summary (last 24 hours): Intake/Output Summary (Last 24 hours) at 18 1625  Last data filed at 18 1324   Gross per 24 hour   Intake             1340 ml   Output             2250 ml   Net             -910 ml       Physical Exam:     Physical Exam    Comfortably sitting up in bed  Neck supple  Lungs clear to auscultation  Heart sounds S1-S2 noted distant no murmurs appreciable  Abdomen soft nontender  Pulses present  Pedal edema noted improved since admission  Awake alert nonfocal neuro exam  No rash        Additional Data:     Labs:      Results from last 7 days  Lab Units 18  0451   WBC Thousand/uL 11 46*   HEMOGLOBIN g/dL 13 2   HEMATOCRIT % 44 1   PLATELETS Thousands/uL 173   NEUTROS PCT % 75   LYMPHS PCT % 14   MONOS PCT % 9   EOS PCT % 2       Results from last 7 days  Lab Units 18  0503  18  0451   SODIUM mmol/L 134*  < > 133*   POTASSIUM mmol/L 3 7  < > 4 4   CHLORIDE mmol/L 97*  < > 92*   CO2 mmol/L 34*  < > 38*   BUN mg/dL 26*  < > 18   CREATININE mg/dL 1 35*  < > 1 44*   CALCIUM mg/dL 8 6  < > 8 9   TOTAL PROTEIN g/dL  --   --  8 3*   BILIRUBIN TOTAL mg/dL  --   --  0 59   ALK PHOS U/L  --   --  72   ALT U/L  --   --  37   AST U/L  --   --  57*   GLUCOSE RANDOM mg/dL 162*  < > 116   < > = values in this interval not displayed  * I Have Reviewed All Lab Data Listed Above  * Additional Pertinent Lab Tests Reviewed:  All Labs Within Last 24 Hours Reviewed    Imaging:    Imaging Reports Reviewed Today Include:   Imaging Personally Reviewed by Myself Includes:     Recent Cultures (last 7 days):           Last 24 Hours Medication List:     Current Facility-Administered Medications:  acetaminophen 325 mg Oral BID Shy Rockwell MD   acetaZOLAMIDE 250 mg Intravenous Q12H [address] Malinda Lynch,    albuterol 2 5 mg Nebulization Q4H PRN Ricardo Dodge MD   aspirin 81 mg Oral Daily Ricardo Dodge MD   benzocaine  Mucosal 4x Daily PRN YOLANDE Bethea   budesonide-formoterol 2 puff Inhalation BID Ricardo Dodge MD   cyclobenzaprine 10 mg Oral BID Kiki Beverage, PA-C   ferrous sulfate 325 mg Oral Daily With Breakfast Ricardo Dodge MD   fluticasone 1 spray Each Nare Daily Shy Rockwell MD   furosemide 40 mg Oral BID (diuretic) Madhavi Cerda MD   gabapentin 300 mg Oral TID Kiki Beverage, PA-C   heparin (porcine) 5,000 Units Subcutaneous Q8H [address] Ricardo Dodge MD   insulin lispro 1-5 Units Subcutaneous TID Leola Meeks MD   insulin lispro 1-5 Units Subcutaneous HS Ricardo Dodge MD   insulin regular 25 Units Subcutaneous Before Dinner YOLANDE Bernabe   insulin regular 45 Units Subcutaneous BID before breakfast/lunch YOLANDE Roe   levalbuterol 1 25 mg Nebulization TID Shy Rockwell MD   metoprolol tartrate 25 mg Oral Q12H Ånhult Alden Meeks MD   oxyCODONE 5 mg Oral Q4H PRN Ricardo Dodge MD   oxyCODONE 7 5 mg Oral BID Kiki Beverage, PA-C   oxyCODONE 10 mg Oral Q4H PRN Ricardo Dodge MD   pantoprazole 40 mg Oral BID AC Naomi Meeks MD   potassium chloride 20 mEq Oral BID Madhavi Cerda MD   pravastatin 40 mg Oral Daily With Tundemaciel Delacruz MD   sodium chloride 3 mL Nebulization TID Shy Rockwell MD   tiotropium 18 mcg Inhalation Daily Ricardo Dodge MD        Today, Patient Was Seen By: Kit Madrid MD    ** Please Note: This note has been constructed using a voice recognition system   **

## 2018-02-08 NOTE — PROGRESS NOTES
Patient's ABG results abnormal  PCO2: 78 5 and pH is 7 254  Patient is alert and oriented, no signs of lethargy  S/w SLIM Oskar Coon, per her request since patient is comfortable no new orders at this time   Day time to readdress tomorrow

## 2018-02-08 NOTE — PLAN OF CARE
Problem: PHYSICAL THERAPY ADULT  Goal: Performs mobility at highest level of function for planned discharge setting  See evaluation for individualized goals  Treatment/Interventions: Functional transfer training, LE strengthening/ROM, Elevations, Therapeutic exercise, Endurance training, Gait training, Spoke to MD, Spoke to nursing  Equipment Recommended: Shanel Escobar (at this time)       See flowsheet documentation for full assessment, interventions and recommendations  Prognosis: Good  Problem List: Decreased strength, Decreased endurance, Impaired balance, Decreased mobility, Decreased safety awareness, Obesity  Assessment: Pt is 81 y o  admitted with hx of SOB and Dx of Acute on chronic diastolic congestive heart failure; NSTEMI, venous stasis dermatitis of both lower extremities  Pt 's comorbidities affecting POC include: COPD (uses O2 at home), CAD, HTN, MI, and CKD and personal factors of: steps in the house and somewhat limited mobility at home as per pt  Pt's clinical presentation is currently unstable/unpredictable which is evident in abn lab values being monitored, noted UE tremors in sitting progressing to upper body shaking and LE buckling while amb, decreased O2 sat post mobilization while remaining on suppl O2 and fall risk (RN and MD informed)  Pt presents w/ general weakness, deconditioning, decreased functional endurance, impaired standing balance and mod gait dysfunction  Will cont to follow pt in PT for progressive mobilization to address above functional deficits and to max level of (I), endurance, and safety  Currently recommend rehab upon D/C to regain premorbid level of (I); pt informed  Will cont to follow until then  Barriers to Discharge:  (multiple steps in the house)     Recommendation: Post acute IP rehab          See flowsheet documentation for full assessment

## 2018-02-08 NOTE — RESTORATIVE TECHNICIAN NOTE
Restorative Specialist Mobility Note       Pt refused ambulation at this time  Will continue to follow up with ambulation attempt(s) as needed  RN jesus alberto Cantu Restorative Specialist

## 2018-02-08 NOTE — RESTORATIVE TECHNICIAN NOTE
Restorative Specialist Mobility Note       Activity: Bedrest, Dangle, Stand at bedside, Turn, Ambulate in room, Ambulate in gomez     Assistive Device: Front wheel walker     Ambulation Response: Tolerated fairly well  Repositioned: Sitting, Up in chair                    Assisted PT during session  For all/additional information please see PT note(s)

## 2018-02-08 NOTE — CONSULTS
Consultation - Neurology   Marcy Salinas Sr  81 y o  male MRN: [id number]  Unit/Bed#: [facility] 530-01 Encounter: [id number]      Assessment/Plan   Tremors-this is actually best categorized as multifocal myoclonus/asterixis, and is commonly seen from toxic or metabolic causes  In this case there is no significant electrolyte derangement  Sometimes this can be seen as adverse effect of beta agonist inhalers, but in this case in reviewing his medication history, I believe that Neurontin, started in November may well be the culprit  It can also be related to narcotic toxicity, but his dose of oxycodone/Percocet has not been changed  There is no suspicion that this is related to seizure or structural lesion  Gait dysfunction - poly factorial secondary to underlying comorbidities, deconditioning, body habitus and peripheral neuropathy  I would recommend discontinue Neurontin and observe  I do not think he needs any imaging studies  If that intervention does not result in improvement, then additional consideration would be given to possible decrease in narcotic doses  Reason for Consult / Principal Problem: Ambulatory dysfunction and tremoring    HPI: Marcy Salinas Sr  is a 81 y o   male who presents with PMHX listed below HTN, CAD with PCI, COPD, CKD, DM, neuropathy  presented to B with SOB, increased lower extremity edema and weight gain  He was seen an evalauted by cardiology whom diagnosed him with acute on chronic diastolic heart failure, this was thought to be secondary to non-compliance with medication  He was placed on dieretics  He was also found to have non STEMI type 2   He is also being treated for chronic respiratory insufficiency with hypoxia  , requiring oxygen  Possible component of sleep apnea and CKD  Neurology was asked to see the patient regards to tremors and gait dysfunction   It was noted today by physical therapy that the patient was tremulous while walking with his knees buckling at times, they were concerned about this tremor and a neurology was asked to evaluate  He reports he has had this tremor for several months  He does note that is more of a single jerking type motion rather than any repetitive tremor  Can affect either arms or legs  He denies vocal or head tremor  He did not think that it was getting particularly worse/progressive  He states his father had a tremor reportedly the result of "shell shock"  He reports this does not affect his ADL, other than occasional problems eating  He denies it has induced any falls  He uses a cane at home  He does not note anything that particularly worsens this or relieves it  Denies any association with timing throughout the day  Did not endorse that it is worse under periods of stress or with activity    No reported prior neurological hx (other than diabetic neuropathy)  Inpatient consult to Neurology  Consult performed by: Christi James ordered by: Muna Pederson        Review of Systems   Constitutional: Positive for activity change and fatigue  HENT: Negative  Respiratory: Negative  Negative for apnea, cough, choking, chest tightness and shortness of breath  Cardiovascular: Positive for leg swelling  Negative for chest pain  Endocrine: Negative  Genitourinary: Negative  Musculoskeletal: Positive for gait problem  Neurological: Positive for tremors, weakness and numbness  Negative for dizziness, seizures, syncope, facial asymmetry, speech difficulty, light-headedness and headaches  Psychiatric/Behavioral: Negative        Historical Information   Past Medical History:   Diagnosis Date    Abdominal pain     Cardiac disease     [facility]     Coronary artery disease     [facility] mellitus [facility]     Hyperlipidemia     Hypertension     MI (myocardial infarction)     with 3 stents    Prostate cancer [facility])      Past Surgical History: Procedure Laterality Date    ABDOMINAL SURGERY      exploratory    ANGIOPLASTY      3 stents    ESOPHAGOGASTRODUODENOSCOPY N/A 10/2/2017    Procedure: ESOPHAGOGASTRODUODENOSCOPY (EGD); Surgeon: Birgit Bruner MD;  Location: [facility]; Service: Gastroenterology    PROSTATE SURGERY       Social History   History   Alcohol Use No     History   Drug Use No     History   Smoking Status    Former Smoker    Packs/day: 1 50    Years: 50 00    Quit date: 9/13/2017   Smokeless Tobacco    Never Used     Family History:   Family History   Problem Relation Age of Onset    Heart disease Father      Review of previous medical records was completed      Meds/Allergies   all current active meds have been reviewed, current meds:   Current Facility-Administered Medications   Medication Dose Route Frequency    acetaminophen (TYLENOL) tablet 325 mg  325 mg Oral BID    acetaZOLAMIDE (DIAMOX) injection 250 mg  250 mg Intravenous Q12H [address]    albuterol inhalation solution 2 5 mg  2 5 mg Nebulization Q4H PRN    aspirin (ECOTRIN LOW STRENGTH) EC tablet 81 mg  81 mg Oral Daily    benzocaine (ANBESOL) 10 % mucosal liquid   Mucosal 4x Daily PRN    budesonide-formoterol (SYMBICORT) 160-4 5 mcg/act inhaler 2 puff  2 puff Inhalation BID    cyclobenzaprine (FLEXERIL) tablet 10 mg  10 mg Oral BID    ferrous sulfate tablet 325 mg  325 mg Oral Daily With Breakfast    fluticasone (FLONASE) 50 mcg/act nasal spray 1 spray  1 spray Each Nare Daily    furosemide (LASIX) tablet 40 mg  40 mg Oral BID (diuretic)    gabapentin (NEURONTIN) capsule 300 mg  300 mg Oral TID    heparin (porcine) subcutaneous injection 5,000 Units  5,000 Units Subcutaneous Q8H [address]    insulin lispro (HumaLOG) 100 units/mL subcutaneous injection 1-5 Units  1-5 Units Subcutaneous TID AC    insulin lispro (HumaLOG) 100 units/mL subcutaneous injection 1-5 Units  1-5 Units Subcutaneous HS    insulin regular (HumuLIN R U-500) 500 units/mL CONCENTRATED injection 25 Units  25 Units Subcutaneous Before Dinner    insulin regular (HumuLIN R U-500) 500 units/mL CONCENTRATED injection 45 Units  45 Units Subcutaneous BID before breakfast/lunch    levalbuterol (XOPENEX) inhalation solution 1 25 mg  1 25 mg Nebulization TID    metoprolol tartrate (LOPRESSOR) tablet 25 mg  25 mg Oral Q12H [address]    oxyCODONE (ROXICODONE) IR tablet 5 mg  5 mg Oral Q4H PRN    oxyCODONE (ROXICODONE) IR tablet 7 5 mg  7 5 mg Oral BID    oxyCODONE (ROXICODONE) oral solution 10 mg  10 mg Oral Q4H PRN    pantoprazole (PROTONIX) EC tablet 40 mg  40 mg Oral BID AC    potassium chloride (K-DUR,KLOR-CON) CR tablet 20 mEq  20 mEq Oral BID    pravastatin (PRAVACHOL) tablet 40 mg  40 mg Oral Daily With Dinner    sodium chloride 0 9 % inhalation solution 3 mL  3 mL Nebulization TID    tiotropium (SPIRIVA) capsule for inhaler 18 mcg  18 mcg Inhalation Daily    and PTA meds:   Prior to Admission Medications   Prescriptions Last Dose Informant Patient Reported? Taking? albuterol (2 5 mg/3 mL) 0 083 % nebulizer solution 2/1/2018 at Unknown time  Yes Yes   Sig: Take 2 5 mg by nebulization Indications: 2-3 times a day PRN     aspirin (ECOTRIN LOW STRENGTH) 81 mg EC tablet 2/1/2018 at Unknown time  Yes Yes   Sig: Take 81 mg by mouth daily   budesonide-formoterol (SYMBICORT) 160-4 5 mcg/act inhaler 2/1/2018 at Unknown time  Yes Yes   Sig: Inhale 2 puffs 2 (two) times a day   cyclobenzaprine (FLEXERIL) 10 mg tablet 2/1/2018 at Unknown time  No Yes   Sig: Take 1 tablet by mouth daily at bedtime   Patient taking differently: Take 10 mg by mouth 2 (two) times a day     ferrous sulfate 325 (65 Fe) mg tablet 2/1/2018 at Unknown time  Yes Yes   Sig: Take 325 mg by mouth daily with breakfast   furosemide (LASIX) 40 mg tablet 1/31/2018 at Unknown time  No Yes   Sig: Take 1 tablet by mouth daily   gabapentin (NEURONTIN) 300 mg capsule 2/1/2018 at Unknown time  Yes Yes   Sig: Take 300 mg by mouth 3 (three) times a day insulin regular (HumuLIN R U-500) 500 units/mL CONCENTRATED injection 2/1/2018 at Unknown time  No Yes   Sig: Inject 0 11 mL under the skin daily before breakfast   insulin regular (HumuLIN R U-500) 500 units/mL CONCENTRATED injection 2/1/2018 at Unknown time  No Yes   Sig: Inject 0 07 mL under the skin daily before dinner   metoprolol tartrate (LOPRESSOR) 25 mg tablet 2/1/2018 at Unknown time  Yes Yes   Sig: Take 25 mg by mouth 2 (two) times a day     oxyCODONE-acetaminophen (PERCOCET) 7 5-325 MG per tablet 2/1/2018 at Unknown time  Yes Yes   Sig: Take 1 tablet by mouth 2 (two) times a day  pantoprazole (PROTONIX) 40 mg tablet 2/1/2018 at Unknown time  No Yes   Sig: Take 1 tablet by mouth 2 (two) times a day before meals   potassium chloride (K-DUR,KLOR-CON) 10 mEq tablet 2/1/2018 at Unknown time  No Yes   Sig: Take 1 tablet by mouth daily   simvastatin (ZOCOR) 40 mg tablet 2/1/2018 at Unknown time  Yes Yes   Sig: Take 40 mg by mouth daily at bedtime  tiotropium (SPIRIVA) 18 mcg inhalation capsule 2/1/2018 at Unknown time  Yes Yes   Sig: Place 18 mcg into inhaler and inhale daily      Facility-Administered Medications: None     Allergies   Allergen Reactions    Metformin      Objective   Vitals:Blood pressure 145/65, pulse 78, temperature 98 °F (36 7 °C), temperature source Oral, resp  rate 20, height 6' (1 829 m), weight 124 kg (273 lb 13 oz), SpO2 95 %  ,Body mass index is 37 14 kg/m²  Intake/Output Summary (Last 24 hours) at 02/08/18 1634  Last data filed at 02/08/18 1324   Gross per 24 hour   Intake             1340 ml   Output             2250 ml   Net             -910 ml     Invasive Devices: Invasive Devices     Peripheral Intravenous Line            Peripheral IV 02/06/18 Right Arm 2 days                Physical Exam   Constitutional: He is oriented to person, place, and time  He appears well-developed and well-nourished  No distress  HENT:   Head: Normocephalic and atraumatic     Mouth/Throat: No oropharyngeal exudate  Oxygen per nasal canula   Eyes: Conjunctivae and EOM are normal  Pupils are equal, round, and reactive to light  Right eye exhibits no discharge  Left eye exhibits no discharge  Neck: No tracheal deviation present  Cardiovascular: Normal rate  No murmur heard  Pulmonary/Chest: Effort normal  No respiratory distress  He has no wheezes  Abdominal: There is no tenderness  Musculoskeletal: He exhibits edema  Gauze dressing over distal right lower extremity   Neurological: He is oriented to person, place, and time  He has normal strength  He has a normal Finger-Nose-Finger Test (no evidence of ataxia) and a normal Heel to [employer]    Reflex Scores:       Tricep reflexes are 1+ on the right side and 1+ on the left side  Bicep reflexes are 1+ on the right side and 1+ on the left side  Brachioradialis reflexes are 1+ on the right side and 1+ on the left side  Patellar reflexes are 2+ on the right side and 2+ on the left side  Achilles reflexes are 0 on the right side and 0 on the left side  Skin: Skin is warm and dry  He is not diaphoretic  Chronic venostasis changes distal lower extremities   Psychiatric: His speech is normal    Vitals reviewed  Neurologic Exam     Mental Status   Oriented to person, place, and time  Follows 2 step commands  Attention: normal  Concentration: normal    Speech: speech is normal   Level of consciousness: alert  Able to name object  Able to read  Able to repeat  Oriented x 3 with out aphasia or dysarthria noted  He was able to recognize objects, he was cooperative, he was able to provide a reasonably accurate history  Cranial Nerves   Cranial nerves II through XII intact  CN II   Visual fields full to confrontation  CN III, IV, VI   Pupils are equal, round, and reactive to light    Extraocular motions are normal    Nystagmus: none   Diplopia: none  Ophthalmoparesis: none    CN V   Facial sensation intact  CN VII   Facial expression full, symmetric  CN VIII   CN VIII normal      CN IX, X   CN IX normal    CN X normal      CN XI   CN XI normal      CN XII   CN XII normal      Motor Exam   Muscle bulk: normal  Overall muscle tone: normal  Right arm pronator drift: absent  Left arm pronator drift: absent    Strength   Strength 5/5 throughout  Sensory Exam   Light touch normal    Right arm vibration: normal  Left arm vibration: normal  Right leg vibration: Diminished from mid shin  Left leg vibration: decreased from toes (Diminished from mid shin)  Moderate stocking distrubution loss in lowers noted     Gait, Coordination, and Reflexes     Coordination   Finger to nose coordination: normal (no evidence of ataxia)  Heel to shin coordination: normal    Reflexes   Right brachioradialis: 1+  Left brachioradialis: 1+  Right biceps: 1+  Left biceps: 1+  Right triceps: 1+  Left triceps: 1+  Right patellar: 2+  Left patellar: 2+  Right achilles: 0  Left achilles: 0  Exhibits frequent spontaneous multifocal myoclonus/asterixis, upper extremities more than lowers  No cogwheel rigidity or increased tone noted  No  head tremor noted  Lab Results:   I have personally reviewed pertinent reports    , CBC:   Results from last 7 days  Lab Units 02/05/18  0451 02/04/18  0433 02/03/18  0502   WBC Thousand/uL 11 46* 8 49 9 00   RBC Million/uL 5 32 5 09 4 84   HEMOGLOBIN g/dL 13 2 12 3 11 8*   HEMATOCRIT % 44 1 42 3 40 8   MCV fL 83 83 84   PLATELETS Thousands/uL 173 148* 151   , BMP/CMP:   Results from last 7 days  Lab Units 02/08/18  0503 02/07/18  0509 02/06/18  0500 02/05/18  0451  02/03/18  0502   SODIUM mmol/L 134* 136 135* 133*  < > 140   POTASSIUM mmol/L 3 7 3 9 3 5 4 4  < > 4 3   CHLORIDE mmol/L 97* 95* 93* 92*  < > 96*   CO2 mmol/L 34* 35* 34* 38*  < > 43*   ANION GAP mmol/L 3* 6 8 3*  < > 1*   BUN mg/dL 26* 25 23 18  < > 19   CREATININE mg/dL 1 35* 1 46* 1 47* 1 44*  < > 1 27   GLUCOSE RANDOM mg/dL 162* 166* 226* 116  < > 41*   CALCIUM mg/dL 8 6 8 8 8 6 8 9  < > 8 1*   AST U/L  --   --   --  57*  --  69*   ALT U/L  --   --   --  37  --  35   ALK PHOS U/L  --   --   --  72  --  63   TOTAL PROTEIN g/dL  --   --   --  8 3*  --  7 3   BILIRUBIN TOTAL mg/dL  --   --   --  0 59  --  0 49   EGFR ml/min/1 73sq m 53 48 48 49  < > 57   < > = values in this interval not displayed  , Vitamin B12:   , HgBA1C:   , TSH:   , Coagulation:   , Lipid Profile:   , Ammonia:   , Urinalysis:       Invalid input(s): URIBILINOGEN, Drug Screen:   , Medication Drug Levels:       Invalid input(s): CARBAMAZEPINE,  PHENOBARB, LACOSAMIDE, OXCARBAZEPINE     Imaging Studies:    Per radiology interpretation -    "  Xr Chest Pa & Lateral    Result Date: 2/7/2018  Narrative: CHEST - DUAL ENERGY INDICATION:  Right pleural effusion COMPARISON:  February 1, 2018 VIEWS:  PA (including soft tissue/bone algorithms) and lateral projections IMAGES:  5 FINDINGS: Cardiomediastinal silhouette appears unremarkable  There is moderate-sized right effusion The left lung is clear Visualized osseous structures appear within normal limits for the patient's age  Impression: Moderate-sized right effusion, unchanged No worsening seen Stable Workstation performed: [id number]     Xr Chest 2 Views    Result Date: 2/1/2018  Narrative: CHEST INDICATION:  CHF, shortness of breath, crackles, diabetic patient with lower extremity wounds  COMPARISON:  10/31/2017 VIEWS:  Frontal and lateral projections IMAGES:  5 FINDINGS:     There is mild cardiomegaly  There is moderate right pleural effusion, increased compared with prior  There is some consolidation at the right lung base which is probably atelectasis or pneumonia  Unilateral edema less likely but not excluded  There seems to be some mediastinal shift towards the right which would indicate that there is probably significant volume loss in the right lower lobe  No pneumothorax  Osseous structures intact       Impression: Moderate right pleural effusion and consolidation of the right base which is probably mostly atelectasis  Pneumonia not entirely excluded  Cardiomegaly Rightward mediastinal shift Recommend continued follow-up Workstation performed: [id number]   "    EKG, Pathology, and Other Studies: I have personally reviewed pertinent reports         Code Status: Level 1 - Full Code    Counseling / Coordination of Care

## 2018-02-08 NOTE — PROGRESS NOTES
Cardiology Progress Note - Barb Costa Sr  81 y o  male MRN: [id number]    Unit/Bed#: [facility] 530-01 Encounter: [id number]      Assessment:  Principal Problem:    Acute on chronic diastolic congestive heart failure ([facility] )  Active Problems:    Obesity (BMI 30-39  9)    Dyslipidemia    Type 2 diabetes mellitus with complication (HCC)    Venous stasis dermatitis of both lower extremities    Chronic respiratory failure with hypoxia (HCC)    COPD (chronic obstructive pulmonary disease) ([facility])    Bilateral lower extremity edema    Chronic kidney disease, stage 3    Non-ST elevation myocardial infarction (NSTEMI), type 2 ([facility] )    Poor dentition      Plan:  Patient is comfortable this morning  He has no chest pain or significant dyspnea  He is on supplemental oxygen  His chest x-ray yesterday looks stable compared to a prior study  His lung fields are reasonably clear  I will switch him to oral diuretic  His renal function and electrolytes are stable  Subjective:   Patient seen and examined  No significant events overnight   negative  Objective:     Vitals: Blood pressure 138/77, pulse 70, temperature 98 1 °F (36 7 °C), temperature source Oral, resp  rate 20, height 6' (1 829 m), weight 124 kg (273 lb 13 oz), SpO2 91 % , Body mass index is 37 14 kg/m² , Orthostatic Blood Pressures    Flowsheet Row Most Recent Value   Blood Pressure  138/77 filed at 02/08/2018 0702   Patient Position - Orthostatic VS  Sitting filed at 02/08/2018 0702      ,      Intake/Output Summary (Last 24 hours) at 02/08/18 0847  Last data filed at 02/08/18 3662   Gross per 24 hour   Intake             1820 ml   Output             2675 ml   Net             -855 ml             Physical Exam:    GEN: Barb Costa Sr      NECK: supple, no carotid bruits, no JVD or HJR  HEART: normal rate, regular rhythm, normal S1 and S2, no murmurs, clicks, gallops or rubs   LUNGS: clear to auscultation bilaterally; no wheezes, rales, or rhonchi   ABDOMEN: normal bowel sounds, soft, no tenderness, no distention  EXTREMITIES: edema  SKIN: warm and well perfused, no suspicious lesions on exposed skin    Labs & Results:    Admission on 02/01/2018   No results displayed because visit has over 200 results  Xr Chest Pa & Lateral    Result Date: 2/7/2018  Narrative: CHEST - DUAL ENERGY INDICATION:  Right pleural effusion COMPARISON:  February 1, 2018 VIEWS:  PA (including soft tissue/bone algorithms) and lateral projections IMAGES:  5 FINDINGS: Cardiomediastinal silhouette appears unremarkable  There is moderate-sized right effusion The left lung is clear Visualized osseous structures appear within normal limits for the patient's age  Impression: Moderate-sized right effusion, unchanged No worsening seen Stable Workstation performed: [id number]     Xr Chest 2 Views    Result Date: 2/1/2018  Narrative: CHEST INDICATION:  CHF, shortness of breath, crackles, diabetic patient with lower extremity wounds  COMPARISON:  10/31/2017 VIEWS:  Frontal and lateral projections IMAGES:  5 FINDINGS:     There is mild cardiomegaly  There is moderate right pleural effusion, increased compared with prior  There is some consolidation at the right lung base which is probably atelectasis or pneumonia  Unilateral edema less likely but not excluded  There seems to be some mediastinal shift towards the right which would indicate that there is probably significant volume loss in the right lower lobe  No pneumothorax  Osseous structures intact  Impression: Moderate right pleural effusion and consolidation of the right base which is probably mostly atelectasis  Pneumonia not entirely excluded  Cardiomegaly Rightward mediastinal shift Recommend continued follow-up Workstation performed: [id number]       EKG personally reviewed by Demian Orozco MD      Counseling / Coordination of Care  Total floor / unit time spent today 30 minutes    Greater than 50% of total time was spent with the patient and / or family counseling and / or coordination of care

## 2018-02-09 LAB
ANION GAP SERPL CALCULATED.3IONS-SCNC: 4 MMOL/L (ref 4–13)
BUN SERPL-MCNC: 26 MG/DL (ref 5–25)
CALCIUM SERPL-MCNC: 8.5 MG/DL (ref 8.3–10.1)
CHLORIDE SERPL-SCNC: 98 MMOL/L (ref 100–108)
CO2 SERPL-SCNC: 36 MMOL/L (ref 21–32)
CREAT SERPL-MCNC: 1.43 MG/DL (ref 0.6–1.3)
GFR SERPL CREATININE-BSD FRML MDRD: 49 ML/MIN/1.73SQ M
GLUCOSE SERPL-MCNC: 122 MG/DL (ref 65–140)
GLUCOSE SERPL-MCNC: 130 MG/DL (ref 65–140)
GLUCOSE SERPL-MCNC: 168 MG/DL (ref 65–140)
GLUCOSE SERPL-MCNC: 216 MG/DL (ref 65–140)
GLUCOSE SERPL-MCNC: 227 MG/DL (ref 65–140)
POTASSIUM SERPL-SCNC: 4.3 MMOL/L (ref 3.5–5.3)
SODIUM SERPL-SCNC: 138 MMOL/L (ref 136–145)
TSH SERPL DL<=0.05 MIU/L-ACNC: 1.87 UIU/ML (ref 0.36–3.74)

## 2018-02-09 PROCEDURE — 99232 SBSQ HOSP IP/OBS MODERATE 35: CPT | Performed by: INTERNAL MEDICINE

## 2018-02-09 PROCEDURE — 80048 BASIC METABOLIC PNL TOTAL CA: CPT | Performed by: INTERNAL MEDICINE

## 2018-02-09 PROCEDURE — 94640 AIRWAY INHALATION TREATMENT: CPT

## 2018-02-09 PROCEDURE — 99232 SBSQ HOSP IP/OBS MODERATE 35: CPT | Performed by: PHYSICIAN ASSISTANT

## 2018-02-09 PROCEDURE — 97166 OT EVAL MOD COMPLEX 45 MIN: CPT

## 2018-02-09 PROCEDURE — 94760 N-INVAS EAR/PLS OXIMETRY 1: CPT

## 2018-02-09 PROCEDURE — 94762 N-INVAS EAR/PLS OXIMTRY CONT: CPT

## 2018-02-09 PROCEDURE — 97530 THERAPEUTIC ACTIVITIES: CPT

## 2018-02-09 PROCEDURE — 84443 ASSAY THYROID STIM HORMONE: CPT | Performed by: INTERNAL MEDICINE

## 2018-02-09 PROCEDURE — G8988 SELF CARE GOAL STATUS: HCPCS

## 2018-02-09 PROCEDURE — 82948 REAGENT STRIP/BLOOD GLUCOSE: CPT

## 2018-02-09 PROCEDURE — G8987 SELF CARE CURRENT STATUS: HCPCS

## 2018-02-09 PROCEDURE — 99222 1ST HOSP IP/OBS MODERATE 55: CPT | Performed by: INTERNAL MEDICINE

## 2018-02-09 RX ADMIN — LEVALBUTEROL HYDROCHLORIDE 1.25 MG: 1.25 SOLUTION, CONCENTRATE RESPIRATORY (INHALATION) at 19:58

## 2018-02-09 RX ADMIN — CYCLOBENZAPRINE HYDROCHLORIDE 10 MG: 10 TABLET, FILM COATED ORAL at 17:56

## 2018-02-09 RX ADMIN — INSULIN LISPRO 1 UNITS: 100 INJECTION, SOLUTION INTRAVENOUS; SUBCUTANEOUS at 08:33

## 2018-02-09 RX ADMIN — LEVALBUTEROL HYDROCHLORIDE 1.25 MG: 1.25 SOLUTION, CONCENTRATE RESPIRATORY (INHALATION) at 05:20

## 2018-02-09 RX ADMIN — POTASSIUM CHLORIDE 20 MEQ: 1500 TABLET, EXTENDED RELEASE ORAL at 08:29

## 2018-02-09 RX ADMIN — BUDESONIDE AND FORMOTEROL FUMARATE DIHYDRATE 2 PUFF: 160; 4.5 AEROSOL RESPIRATORY (INHALATION) at 08:37

## 2018-02-09 RX ADMIN — TIOTROPIUM BROMIDE 18 MCG: 18 CAPSULE ORAL; RESPIRATORY (INHALATION) at 08:35

## 2018-02-09 RX ADMIN — ISODIUM CHLORIDE 3 ML: 0.03 SOLUTION RESPIRATORY (INHALATION) at 13:15

## 2018-02-09 RX ADMIN — HEPARIN SODIUM 5000 UNITS: 5000 INJECTION, SOLUTION INTRAVENOUS; SUBCUTANEOUS at 06:09

## 2018-02-09 RX ADMIN — ACETAMINOPHEN 325 MG: 325 TABLET, FILM COATED ORAL at 08:29

## 2018-02-09 RX ADMIN — OXYCODONE HYDROCHLORIDE 7.5 MG: 5 TABLET ORAL at 08:30

## 2018-02-09 RX ADMIN — WATER 10 ML: 1 INJECTION INTRAMUSCULAR; INTRAVENOUS; SUBCUTANEOUS at 22:00

## 2018-02-09 RX ADMIN — FUROSEMIDE 40 MG: 40 TABLET ORAL at 08:31

## 2018-02-09 RX ADMIN — LEVALBUTEROL HYDROCHLORIDE 1.25 MG: 1.25 SOLUTION, CONCENTRATE RESPIRATORY (INHALATION) at 13:15

## 2018-02-09 RX ADMIN — ACETAZOLAMIDE 250 MG: 500 INJECTION, POWDER, LYOPHILIZED, FOR SOLUTION INTRAVENOUS at 21:58

## 2018-02-09 RX ADMIN — ASPIRIN 81 MG: 81 TABLET, COATED ORAL at 08:31

## 2018-02-09 RX ADMIN — HEPARIN SODIUM 5000 UNITS: 5000 INJECTION, SOLUTION INTRAVENOUS; SUBCUTANEOUS at 13:53

## 2018-02-09 RX ADMIN — INSULIN LISPRO 2 UNITS: 100 INJECTION, SOLUTION INTRAVENOUS; SUBCUTANEOUS at 11:50

## 2018-02-09 RX ADMIN — OXYCODONE HYDROCHLORIDE 7.5 MG: 5 TABLET ORAL at 17:55

## 2018-02-09 RX ADMIN — Medication 325 MG: at 08:29

## 2018-02-09 RX ADMIN — FLUTICASONE PROPIONATE 1 SPRAY: 50 SPRAY, METERED NASAL at 08:36

## 2018-02-09 RX ADMIN — POTASSIUM CHLORIDE 20 MEQ: 1500 TABLET, EXTENDED RELEASE ORAL at 17:56

## 2018-02-09 RX ADMIN — FUROSEMIDE 40 MG: 40 TABLET ORAL at 16:28

## 2018-02-09 RX ADMIN — INSULIN HUMAN 45 UNITS: 500 INJECTION, SOLUTION SUBCUTANEOUS at 11:49

## 2018-02-09 RX ADMIN — PRAVASTATIN SODIUM 40 MG: 40 TABLET ORAL at 16:28

## 2018-02-09 RX ADMIN — ACETAMINOPHEN 325 MG: 325 TABLET, FILM COATED ORAL at 17:55

## 2018-02-09 RX ADMIN — ISODIUM CHLORIDE 3 ML: 0.03 SOLUTION RESPIRATORY (INHALATION) at 05:20

## 2018-02-09 RX ADMIN — BUDESONIDE AND FORMOTEROL FUMARATE DIHYDRATE 2 PUFF: 160; 4.5 AEROSOL RESPIRATORY (INHALATION) at 17:57

## 2018-02-09 RX ADMIN — METOPROLOL TARTRATE 25 MG: 25 TABLET ORAL at 21:59

## 2018-02-09 RX ADMIN — INSULIN HUMAN 45 UNITS: 500 INJECTION, SOLUTION SUBCUTANEOUS at 08:34

## 2018-02-09 RX ADMIN — METOPROLOL TARTRATE 25 MG: 25 TABLET ORAL at 08:29

## 2018-02-09 RX ADMIN — ISODIUM CHLORIDE 3 ML: 0.03 SOLUTION RESPIRATORY (INHALATION) at 19:59

## 2018-02-09 RX ADMIN — INSULIN HUMAN 25 UNITS: 500 INJECTION, SOLUTION SUBCUTANEOUS at 17:00

## 2018-02-09 RX ADMIN — ACETAZOLAMIDE 250 MG: 500 INJECTION, POWDER, LYOPHILIZED, FOR SOLUTION INTRAVENOUS at 11:50

## 2018-02-09 RX ADMIN — CYCLOBENZAPRINE HYDROCHLORIDE 10 MG: 10 TABLET, FILM COATED ORAL at 08:31

## 2018-02-09 RX ADMIN — WATER: 1 INJECTION INTRAMUSCULAR; INTRAVENOUS; SUBCUTANEOUS at 12:00

## 2018-02-09 RX ADMIN — HEPARIN SODIUM 5000 UNITS: 5000 INJECTION, SOLUTION INTRAVENOUS; SUBCUTANEOUS at 21:58

## 2018-02-09 RX ADMIN — PANTOPRAZOLE SODIUM 40 MG: 40 TABLET, DELAYED RELEASE ORAL at 06:09

## 2018-02-09 RX ADMIN — PANTOPRAZOLE SODIUM 40 MG: 40 TABLET, DELAYED RELEASE ORAL at 16:28

## 2018-02-09 NOTE — PLAN OF CARE
CARDIOVASCULAR - ADULT     Maintains optimal cardiac output and hemodynamic stability Progressing     Absence of cardiac dysrhythmias or at baseline rhythm Progressing        DISCHARGE PLANNING     Discharge to home or other facility with appropriate resources Progressing        DISCHARGE PLANNING - CARE MANAGEMENT     Discharge to post-acute care or home with appropriate resources Progressing        Knowledge Deficit     Patient/family/caregiver demonstrates understanding of disease process, treatment plan, medications, and discharge instructions Progressing        METABOLIC, FLUID AND ELECTROLYTES - ADULT     Electrolytes maintained within normal limits Progressing     Fluid balance maintained Progressing     Glucose maintained within target range Progressing        Nutrition/Hydration-ADULT     Nutrient/Hydration intake appropriate for improving, restoring or maintaining nutritional needs Progressing        PAIN - ADULT     Verbalizes/displays adequate comfort level or baseline comfort level Progressing        Potential for Falls     Patient will remain free of falls Progressing        RESPIRATORY - ADULT     Achieves optimal ventilation and oxygenation Progressing        SKIN/TISSUE INTEGRITY - ADULT     Skin integrity remains intact Progressing     Incision(s), wounds(s) or drain site(s) healing without S/S of infection Progressing     Oral mucous membranes remain intact Progressing

## 2018-02-09 NOTE — PLAN OF CARE
Problem: OCCUPATIONAL THERAPY ADULT  Goal: Performs self-care activities at highest level of function for planned discharge setting  See evaluation for individualized goals  Treatment Interventions: ADL retraining, Functional transfer training, Endurance training, Patient/family training, Equipment evaluation/education, Compensatory technique education, Continued evaluation, Energy conservation, Activityengagement          See flowsheet documentation for full assessment, interventions and recommendations  Limitation: Decreased ADL status, Decreased endurance, Decreased self-care trans, Decreased high-level ADLs  Prognosis: Good  Assessment: Pt  is a 81 y o  male who was admitted to [facility] on 2/1/2018 with acute on chronic CHF  Pt  w/a significant PMH of Dm, CKD, CAD, HLD, HTN, hx  Of MI, obesity  Pt  currently lives in [address] [place] [address]  With wife and son  PTA, pt  (I) in all ADLs/IADLs  Pt did use SPC for functional mobility  Currently, pt  requires mod A x1 (with chair follow) for functional mobility and min A x1 for transfers, s/u for UB ADLs and mod A for LB ADLs  A is needed d/t the following impairments: decreased functional activity tolerance, generalized weakness, deconditioning, decreased balance, +UE tremors, +LE buckling during ambulation, increased impulsivity  Therefore, pt  will benefit from skilled OT services to maximize functional gains, monitor status and prevent decline  Will continue to follow pt  3-5x/week to meet the goals described below in 7-10 days  Recommend STR once medically stable  OT Discharge Recommendation: Short Term Rehab  OT - OK to Discharge:  Yes

## 2018-02-09 NOTE — PLAN OF CARE
Problem: PHYSICAL THERAPY ADULT  Goal: Performs mobility at highest level of function for planned discharge setting  See evaluation for individualized goals  Treatment/Interventions: Functional transfer training, LE strengthening/ROM, Elevations, Therapeutic exercise, Endurance training, Gait training, Spoke to MD, Spoke to nursing  Equipment Recommended: Sabina Cancino (at this time)       See flowsheet documentation for full assessment, interventions and recommendations  Outcome: Progressing  Prognosis: Good  Problem List: Decreased strength, Decreased endurance, Impaired balance, Decreased mobility  Assessment: received pt  sitting unsupported on EOB   pt  declined any OOB activity2*  fear of falling     educated pt  on  importance of  ambulation   he continues to decline stating  that he  will try  when his tremors  subside        pt  did  complete  therex arom ble  setaed      will  cont to see for mobility  training      Barriers to Discharge:  (multiple steps in the house)     Recommendation: Post acute IP rehab          See flowsheet documentation for full assessment

## 2018-02-09 NOTE — PROGRESS NOTES
Progress Note - Neurology   Devonte Mcdonald Sr  81 y o  male MRN: [id number]  Unit/Bed#: [facility] 530-01 Encounter: [id number]    Assessment:  74-year-old male with history of hypertension, CAD with PCN, COPD, CKD, DM, neuropathy, presents with multifocal myoclonus/asterixis, deemed likely due to recent initiation of Neurontin  Plan:  1  Observing patient's myoclonus/asterixis off of Neurontin  If no clear improvement observed over the next day or two, consider decreasing narcotic medication dosing  2   Cardiology following with acute on chronic CHF, on oral diuretics  3   Pulmonary following, on supplemental oxygen via nasal cannula  Considering thoracentesis to evaluate right pleural effusion  4   Supportive care  5  Will discuss with attending neurologist  Continue to monitor neurologic status  Subjective:   Patient resting in bed, doing okay today  Notes multifocal myoclonus/asterixis is persisting today, no subjective improvement or worsening compared to evaluation yesterday  Last dose of Neurontin was last night before discontinuing  Patient states the myoclonus is more prominent with action/intention than rest; especially when holding on to objects and attempt to use food  Otherwise no acute changes or new neurologic symptoms appreciated  ROS:  Denies headache, acute vision/speech changes, focal weakness or sensory changes, chest pain, shortness of breath, abdominal pain  Vitals: Blood pressure 128/59, pulse 74, temperature 97 8 °F (36 6 °C), resp  rate 18, height 6' (1 829 m), weight 124 kg (272 lb 14 9 oz), SpO2 96 %  ,Body mass index is 37 02 kg/m²  Physical Exam:  Physical Exam   Constitutional: He is oriented to person, place, and time  He appears well-developed and well-nourished  HENT:   Head: Normocephalic and atraumatic  Eyes: Conjunctivae and EOM are normal  Pupils are equal, round, and reactive to light  Neck: Normal range of motion  Neck supple     Cardiovascular: Normal rate and regular rhythm  Pulmonary/Chest: Effort normal and breath sounds normal    Abdominal: Soft  Bowel sounds are normal    Neurological: He is alert and oriented to person, place, and time  He has a normal Finger-Nose-Finger Test and a normal Heel to [employer]    Reflex Scores:       Bicep reflexes are 1+ on the right side and 1+ on the left side  Brachioradialis reflexes are 1+ on the right side and 1+ on the left side  Patellar reflexes are 2+ on the right side and 2+ on the left side  Achilles reflexes are 0 on the right side and 0 on the left side  Skin: Skin is warm and dry  Venous stasis changes in lower extremities   Psychiatric: His speech is normal       Neurologic Exam     Mental Status   Oriented to person, place, and time  Follows 2 step commands  Attention: normal  Concentration: normal    Speech: speech is normal   Knowledge: good  Able to perform simple calculations  Able to read  Able to repeat  Normal comprehension  Cranial Nerves     CN II   Visual fields full to confrontation  CN III, IV, VI   Pupils are equal, round, and reactive to light  Extraocular motions are normal      CN V   Facial sensation intact  CN VII   Facial expression full, symmetric  CN VIII   CN VIII normal      CN IX, X   CN IX normal    CN X normal      CN XI   CN XI normal      CN XII   CN XII normal      Motor Exam   Muscle bulk: normal  Overall muscle tone: normal  Right arm pronator drift: absent  Left arm pronator drift: absent5/5 upper and lower extremity strength throughout bilaterally  Myoclonic movements of the upper extremities observed during exam   More prominent with action such as holding a phone or outstretched arms  Asterixis of the upper extremities (left slightly greater than right) with outstretched arms  Sensory Exam   Light touch normal    Vibration normal      Length dependent/stocking distribution sensory loss in the lower extremities  Gait, Coordination, and Reflexes     Coordination   Finger to nose coordination: normal  Heel to shin coordination: normal    Tremor   Resting tremor: absent  Intention tremor: absent    Reflexes   Right brachioradialis: 1+  Left brachioradialis: 1+  Right biceps: 1+  Left biceps: 1+  Right patellar: 2+  Left patellar: 2+  Right achilles: 0  Left achilles: 0  Right ankle clonus: absent  Left ankle clonus: absent      Lab, Imaging and other studies:   No new imaging at time of evaluation  CBC:   Results from last 7 days  Lab Units 02/05/18  0451 02/04/18  0433 02/03/18  0502   WBC Thousand/uL 11 46* 8 49 9 00   RBC Million/uL 5 32 5 09 4 84   HEMOGLOBIN g/dL 13 2 12 3 11 8*   HEMATOCRIT % 44 1 42 3 40 8   MCV fL 83 83 84   PLATELETS Thousands/uL 173 148* 151   , BMP/CMP:   Results from last 7 days  Lab Units 02/09/18  0534 02/08/18  0503 02/07/18  0509  02/05/18  0451  02/03/18  0502   SODIUM mmol/L 138 134* 136  < > 133*  < > 140   POTASSIUM mmol/L 4 3 3 7 3 9  < > 4 4  < > 4 3   CHLORIDE mmol/L 98* 97* 95*  < > 92*  < > 96*   CO2 mmol/L 36* 34* 35*  < > 38*  < > 43*   ANION GAP mmol/L 4 3* 6  < > 3*  < > 1*   BUN mg/dL 26* 26* 25  < > 18  < > 19   CREATININE mg/dL 1 43* 1 35* 1 46*  < > 1 44*  < > 1 27   GLUCOSE RANDOM mg/dL 168* 162* 166*  < > 116  < > 41*   CALCIUM mg/dL 8 5 8 6 8 8  < > 8 9  < > 8 1*   AST U/L  --   --   --   --  57*  --  69*   ALT U/L  --   --   --   --  37  --  35   ALK PHOS U/L  --   --   --   --  72  --  63   TOTAL PROTEIN g/dL  --   --   --   --  8 3*  --  7 3   BILIRUBIN TOTAL mg/dL  --   --   --   --  0 59  --  0 49   EGFR ml/min/1 73sq m 49 53 48  < > 49  < > 57   < > = values in this interval not displayed  , Vitamin B12:   , HgBA1C:   , TSH:   , Lipid Profile:   , Urinalysis:       Invalid input(s): URIBILINOGEN  VTE Prophylaxis: Sequential compression device (Venodyne)  and Heparin    Counseling / Coordination of Care  Total time spent today 25 minutes   Greater than 50% of total time was spent with the patient and / or family counseling and / or coordination of care  A description of the counseling / coordination of care:  Monitoring plan clonus/asterixis off Neurontin  Continue metabolic/infectious derangement monitoring

## 2018-02-09 NOTE — CONSULTS
Pulmonary Consultation   Tutu Calix  81 y o  male MRN: [id number]  Unit/Bed#: [facility] 530-01 Encounter: [id number]      Reason for consultation: hypercarbic respiratory failure    Requesting physician: Dr Terri Triplett    Impressions/Recommendations:     · Chronic hypercapnic respiratory failure:  Check nocturnal oximetry on supplemental O2 and AM ABG on O2 to approve him for home nocturnal BIPAP therapy  Check a TSH to rule out hypothyroidism  Continue meds  Pt was encouraged to continue his regimen at home    · Rt pleural effusion:  I advised thoracentesis for diagnostic reasons and maybe therapeutic benefit, however, he declined until he talks to his wife  This can also be done as an out patient  IF he agrees, send the fluid for studies, especially culture and cytology      · Copd: continue same meds and follow w/ Dr Valerie Glover as an out patient  History of Present Illness   HPI:  Raeann George Sr  is a 81 y o  male who was admitted for increased SOB and clinically is feeling about his baseline with the exception of muscle twitches  An evaluation of his asterixis and myoclonus led to an ABG which showed a pCO2 76  Pt has a known h/o COPD and follows w/ Dr Valerie Glover but has difficulty financially and socially following thru w/ his medical therapy  He has limited exercise tolerance but denies cough, wheeze, sputum or even SOB  He has supplemental home O2 but compliance is questionable since on at least one office visit he wasn/t wearing it  He was also evaluated as on out patient for JACQUELINE w/ the sleep study (-) except for REM sleep, however, he had frequent O2 desats throughout the testing  Pt states he sleep well without arousals  Review of systems:  Patient denies headache or vision changes  Weight is stable  Denies sore throat or runny nose  Denies fever, chills or sweats  Denies chest pain or palpitations  Denies nausea, vomiting or diarrhea      Admits to lower extremity edema which is improved since admission  Denies skin rashes  Denies dysuria or hematuria  All other 12-point review of systems are negative  Historical Information   Past Medical History:   Diagnosis Date    Abdominal pain     Cardiac disease     [facility]     Coronary artery disease     Diabetes mellitus ([facility] )     Hyperlipidemia     Hypertension     MI (myocardial infarction)     with 3 stents    Prostate cancer [facility])      Past Surgical History:   Procedure Laterality Date    ABDOMINAL SURGERY      exploratory    ANGIOPLASTY      3 stents    ESOPHAGOGASTRODUODENOSCOPY N/A 10/2/2017    Procedure: ESOPHAGOGASTRODUODENOSCOPY (EGD); Surgeon: Theo Vizcaino MD;  Location: [facility];   Service: Gastroenterology    PROSTATE SURGERY       Family History   Problem Relation Age of Onset    Heart disease Father        Tobacco history: 48 pack yr h/o smoking but quit 10 yr ago    Family history: heart disease    Meds/Allergies   Current Facility-Administered Medications   Medication Dose Route Frequency    acetaminophen (TYLENOL) tablet 325 mg  325 mg Oral BID    acetaZOLAMIDE (DIAMOX) injection 250 mg  250 mg Intravenous Q12H [facility]    albuterol inhalation solution 2 5 mg  2 5 mg Nebulization Q4H PRN    aspirin (ECOTRIN LOW STRENGTH) EC tablet 81 mg  81 mg Oral Daily    benzocaine (ANBESOL) 10 % mucosal liquid   Mucosal 4x Daily PRN    budesonide-formoterol (SYMBICORT) 160-4 5 mcg/act inhaler 2 puff  2 puff Inhalation BID    cyclobenzaprine (FLEXERIL) tablet 10 mg  10 mg Oral BID    ferrous sulfate tablet 325 mg  325 mg Oral Daily With Breakfast    fluticasone (FLONASE) 50 mcg/act nasal spray 1 spray  1 spray Each Nare Daily    furosemide (LASIX) tablet 40 mg  40 mg Oral BID (diuretic)    heparin (porcine) subcutaneous injection 5,000 Units  5,000 Units Subcutaneous Q8H [facility]    insulin lispro (HumaLOG) 100 units/mL subcutaneous injection 1-5 Units  1-5 Units Subcutaneous TID AC    insulin lispro (HumaLOG) 100 units/mL subcutaneous injection 1-5 Units  1-5 Units Subcutaneous HS    insulin regular (HumuLIN R U-500) 500 units/mL CONCENTRATED injection 25 Units  25 Units Subcutaneous Before Dinner    insulin regular (HumuLIN R U-500) 500 units/mL CONCENTRATED injection 45 Units  45 Units Subcutaneous BID before breakfast/lunch    levalbuterol (XOPENEX) inhalation solution 1 25 mg  1 25 mg Nebulization TID    metoprolol tartrate (LOPRESSOR) tablet 25 mg  25 mg Oral Q12H [address]    oxyCODONE (ROXICODONE) IR tablet 5 mg  5 mg Oral Q4H PRN    oxyCODONE (ROXICODONE) IR tablet 7 5 mg  7 5 mg Oral BID    oxyCODONE (ROXICODONE) oral solution 10 mg  10 mg Oral Q4H PRN    pantoprazole (PROTONIX) EC tablet 40 mg  40 mg Oral BID AC    potassium chloride (K-DUR,KLOR-CON) CR tablet 20 mEq  20 mEq Oral BID    pravastatin (PRAVACHOL) tablet 40 mg  40 mg Oral Daily With Dinner    sodium chloride 0 9 % inhalation solution 3 mL  3 mL Nebulization TID    tiotropium (SPIRIVA) capsule for inhaler 18 mcg  18 mcg Inhalation Daily     Allergies   Allergen Reactions    Metformin        Vitals: Blood pressure 128/59, pulse 74, temperature 97 8 °F (36 6 °C), resp  rate 18, height 6' (1 829 m), weight 124 kg (272 lb 14 9 oz), SpO2 96 % , on 3lpm nc, Body mass index is 37 02 kg/m²      Intake/Output Summary (Last 24 hours) at 02/09/18 1605  Last data filed at 02/09/18 1159   Gross per 24 hour   Intake              890 ml   Output             2000 ml   Net            -1110 ml       Physical exam:    General Appearance:    Alert, cooperative, no conversational dyspnea or accessory     muscle use       Head/eyes:    Normocephalic, without obvious abnormality, atraumatic,         PERRL, extraocular muscles intact, no scleral icterus    Nose:   Nares normal, septum midline, mucosa normal, no drainage    or sinus tenderness   Throat:   Moist mucous membranes, no thrush   Neck:   Supple, trachea midline, no adenopathy; no carotid    bruit or JVD   Lungs:     Diminished w/ long I:E but NO wheezes, rales, rhonchi  Cough is dry  Chest Wall:    No tenderness or deformity    Heart:    Regular rate and rhythm, S1 and S2 normal, no murmur, rub   or gallop  Heart sounds are generally distant   Abdomen:     Soft, non-tender, bowel sounds active all four quadrants,     no masses, no organomegaly  obese   Extremities:   Extremities normal, atraumatic, no cyanosis  1-2 + edema bilaterally   Skin:  chronic stasis changes on the legs w/ Rt lower leg ulcerated   Lymph nodes:   Cervical and supraclavicular nodes normal   Neurologic:   CNII-XII intact, normal strength, non-focal     Labs: I have personally reviewed pertinent lab results  Results from last 7 days  Lab Units 02/05/18  0451 02/04/18  0433 02/03/18  0502   WBC Thousand/uL 11 46* 8 49 9 00   HEMOGLOBIN g/dL 13 2 12 3 11 8*   HEMATOCRIT % 44 1 42 3 40 8   PLATELETS Thousands/uL 173 148* 151           Results from last 7 days  Lab Units 02/09/18  0534 02/08/18  0503 02/07/18  0509  02/05/18  0451  02/03/18  0502   SODIUM mmol/L 138 134* 136  < > 133*  < > 140   POTASSIUM mmol/L 4 3 3 7 3 9  < > 4 4  < > 4 3   CHLORIDE mmol/L 98* 97* 95*  < > 92*  < > 96*   CO2 mmol/L 36* 34* 35*  < > 38*  < > 43*   BUN mg/dL 26* 26* 25  < > 18  < > 19   CREATININE mg/dL 1 43* 1 35* 1 46*  < > 1 44*  < > 1 27   CALCIUM mg/dL 8 5 8 6 8 8  < > 8 9  < > 8 1*   TOTAL PROTEIN g/dL  --   --   --   --  8 3*  --  7 3   BILIRUBIN TOTAL mg/dL  --   --   --   --  0 59  --  0 49   ALK PHOS U/L  --   --   --   --  72  --  63   ALT U/L  --   --   --   --  37  --  35   AST U/L  --   --   --   --  57*  --  69*   GLUCOSE RANDOM mg/dL 168* 162* 166*  < > 116  < > 41*   < > = values in this interval not displayed              Imaging and other studies: I have personally reviewed pertinent films in PACS   CXR from 2/6 shows small to moderate Rt pleural effusion which appear to be chronic    Pulmonary function testing: unable to locate PFTs    Other Studies: I have personally reviewed pertinent reports  Sleep study reviewed and is suggestive of nocturnal desaturations that occur w/ chronic respiratory failure but (-) for JACQUELINE    Code Status: Level 1 - Full Code    Thank you for allowing us to participate in the care of your patient      Karli Edwards MD

## 2018-02-09 NOTE — PROGRESS NOTES
Cardiology Progress Note - Ric Crawford Sr  81 y o  male MRN: [id number]    Unit/Bed#: [facility] 530-01 Encounter: [id number]      Assessment:  Principal Problem:    Acute on chronic diastolic congestive heart failure ([facility] [address] )  Active Problems:    Obesity (BMI 30-39  9)    Dyslipidemia    Type 2 diabetes mellitus with complication (HCC)    Venous stasis dermatitis of both lower extremities    Chronic respiratory failure with hypoxia and hypercapnia (HCC)    COPD (chronic obstructive pulmonary disease) ([facility])    Bilateral lower extremity edema    Chronic kidney disease, stage 3    Non-ST elevation myocardial infarction (NSTEMI), type 2 ([address] )    Poor dentition      Plan:  Patient looks much improved  He has no chest pain or significant dyspnea  He is in the process of discharge planning  I agree with his present medical regimen  He will follow up with his primary cardiologist Dr Donzetta Dubin as an outpatient  His BMP today is comparable to yesterday's  Please call if I can be of further assistance  Subjective:   Patient seen and examined  No significant events overnight   negative  Objective:     Vitals: Blood pressure 128/59, pulse 74, temperature 97 8 °F (36 6 °C), resp  rate 18, height 6' (1 829 m), weight 124 kg (272 lb 14 9 oz), SpO2 96 %  , Body mass index is 37 02 kg/m² , Orthostatic Blood Pressures    Flowsheet Row Most Recent Value   Blood Pressure  128/59 filed at 02/09/2018 6736   Patient Position - Orthostatic VS  Lying filed at 02/09/2018 0325      ,      Intake/Output Summary (Last 24 hours) at 02/09/18 0927  Last data filed at 02/09/18 5328   Gross per 24 hour   Intake              540 ml   Output             2150 ml   Net            -1610 ml           Physical Exam:    GEN: Ric Crawford Sr      NECK: supple, no carotid bruits, no JVD or HJR  HEART: normal rate, regular rhythm, normal S1 and S2, no murmurs, clicks, gallops or rubs   LUNGS: clear to auscultation bilaterally; no wheezes, rales, or rhonchi   ABDOMEN: normal bowel sounds, soft, no tenderness, no distention  EXTREMITIES: edema  SKIN: warm and well perfused, no suspicious lesions on exposed skin    Labs & Results:    Admission on 02/01/2018   No results displayed because visit has over 200 results  Xr Chest Pa & Lateral    Result Date: 2/7/2018  Narrative: CHEST - DUAL ENERGY INDICATION:  Right pleural effusion COMPARISON:  February 1, 2018 VIEWS:  PA (including soft tissue/bone algorithms) and lateral projections IMAGES:  5 FINDINGS: Cardiomediastinal silhouette appears unremarkable  There is moderate-sized right effusion The left lung is clear Visualized osseous structures appear within normal limits for the patient's age  Impression: Moderate-sized right effusion, unchanged No worsening seen Stable Workstation performed: [id number]     Xr Chest 2 Views    Result Date: 2/1/2018  Narrative: CHEST INDICATION:  CHF, shortness of breath, crackles, diabetic patient with lower extremity wounds  COMPARISON:  10/31/2017 VIEWS:  Frontal and lateral projections IMAGES:  5 FINDINGS:     There is mild cardiomegaly  There is moderate right pleural effusion, increased compared with prior  There is some consolidation at the right lung base which is probably atelectasis or pneumonia  Unilateral edema less likely but not excluded  There seems to be some mediastinal shift towards the right which would indicate that there is probably significant volume loss in the right lower lobe  No pneumothorax  Osseous structures intact  Impression: Moderate right pleural effusion and consolidation of the right base which is probably mostly atelectasis  Pneumonia not entirely excluded  Cardiomegaly Rightward mediastinal shift Recommend continued follow-up Workstation performed: [id number]       EKG personally reviewed by Davina Coronel MD      Counseling / Coordination of Care  Total floor / unit time spent today 30 minutes    Greater than 50% of total time was spent with the patient and / or family counseling and / or coordination of care

## 2018-02-09 NOTE — SOCIAL WORK
CM call to follow up with Pt's spouse Shima Michele per his request to discuss SNF choices and was informed she still need to review the list and will be in tomorrow to do that  CM to follow up with the Pt and spouse SNF choices

## 2018-02-09 NOTE — PROGRESS NOTES
[facility] Internal Medicine Progress Note  Patient: Memory Canavan  81 y o  male   MRN: [id number]  PCP: Tatiana Light MD  Unit/Bed#: [facility] 530-01 Encounter: [id number]  Date Of Visit: 02/09/18    Assessment:    Principal Problem:    Acute on chronic diastolic congestive heart failure ([facility] [address] )  Active Problems:    Obesity (BMI 30-39  9)    Dyslipidemia    Type 2 diabetes mellitus with complication (HCC)    Venous stasis dermatitis of both lower extremities    Chronic respiratory failure with hypoxia and hypercapnia (HCC)    COPD (chronic obstructive pulmonary disease) (HCC)    Bilateral lower extremity edema    Chronic kidney disease, stage 3    Non-ST elevation myocardial infarction (NSTEMI), type 2 (HCC)    Poor dentition      Plan:    · Acute on chronic diastolic congestive heart failure continue p o  Lasix, acetazolamide per Cardiology, less than 2 g salt diet  · NSTEMI 2  · Chronic hypoxic and hypercapnic respiratory failure continue respiratory treatments, supplemental oxygen to keep O2 sats more than 88%  · Respiratory acidosis with hypercapnia will likely require BiPAP, discussed with Pulmonary overnight pulse ox and ABG in the morning will be checked  · Asterixis likely due to carbon dioxide narcosis, Neurology input noted gabapentin has been discontinued, discussed with Neurology  · Ambulatory dysfunction multifactorial  · Chronic kidney disease stage 3 monitor kidney function closely  · Diabetes mellitus type 2 A1c 6 8  · Morbidly obese  · Out of bed as able      VTE Pharmacologic Prophylaxis:   Pharmacologic: Heparin  Mechanical VTE Prophylaxis in Place: Yes    Patient Centered Rounds: I have performed bedside rounds with nursing staff today  Discussions with Specialists or Other Care Team Provider:     Education and Discussions with Family / Patient: pt, offered to call wife he says he will report to her himself    Time Spent for Care: 30 minutes    More than 50% of total time spent on counseling and coordination of care as described above  Current Length of Stay: 8 day(s)    Current Patient Status: Inpatient   Certification Statement: The patient will continue to require additional inpatient hospital stay due to As mentioned    Discharge Plan / Estimated Discharge Date:  Pending placement discussed with case management    Code Status: Level 1 - Full Code      Subjective:     Comfortably sitting up in chair  Reports improving dyspnea  Fatigued      Objective:     Vitals:   Temp (24hrs), Av 8 °F (36 6 °C), Min:97 5 °F (36 4 °C), Max:98 1 °F (36 7 °C)    HR:  [74-83] 74  Resp:  [18-22] 18  BP: (117-129)/(56-59) 128/59  SpO2:  [95 %-99 %] 96 %  Body mass index is 37 02 kg/m²  Input and Output Summary (last 24 hours):        Intake/Output Summary (Last 24 hours) at 18 1702  Last data filed at 18 1159   Gross per 24 hour   Intake              890 ml   Output             2000 ml   Net            -1110 ml       Physical Exam:     Physical Exam     Obese  Short thick neck  Lungs diminished breath sounds bilaterally  Heart sounds distant S1-S2 noted no murmurs appreciable  Abdomen soft nontender  Pulses presently  Pedal edema improving  Awake alert obeys simple commands  No rash      Additional Data:     Labs:      Results from last 7 days  Lab Units 18  0451   WBC Thousand/uL 11 46*   HEMOGLOBIN g/dL 13 2   HEMATOCRIT % 44 1   PLATELETS Thousands/uL 173   NEUTROS PCT % 75   LYMPHS PCT % 14   MONOS PCT % 9   EOS PCT % 2       Results from last 7 days  Lab Units 18  0534  18  0451   SODIUM mmol/L 138  < > 133*   POTASSIUM mmol/L 4 3  < > 4 4   CHLORIDE mmol/L 98*  < > 92*   CO2 mmol/L 36*  < > 38*   BUN mg/dL 26*  < > 18   CREATININE mg/dL 1 43*  < > 1 44*   CALCIUM mg/dL 8 5  < > 8 9   TOTAL PROTEIN g/dL  --   --  8 3*   BILIRUBIN TOTAL mg/dL  --   --  0 59   ALK PHOS U/L  --   --  72   ALT U/L  --   --  37   AST U/L  --   --  57*   GLUCOSE RANDOM mg/dL 168*  < > 116 < > = values in this interval not displayed  * I Have Reviewed All Lab Data Listed Above  * Additional Pertinent Lab Tests Reviewed:  All Labs Within Last 24 Hours Reviewed    Imaging:    Imaging Reports Reviewed Today Include:   Imaging Personally Reviewed by Myself Includes:     Recent Cultures (last 7 days):           Last 24 Hours Medication List:     Current Facility-Administered Medications:  acetaminophen 325 mg Oral BID Radhika Kruse MD   acetaZOLAMIDE 250 mg Intravenous Q12H [facility] Jose Zeng DO   albuterol 2 5 mg Nebulization Q4H PRN Kellen Collins MD   aspirin 81 mg Oral Daily Kellen Collins MD   benzocaine  Mucosal 4x Daily PRN YOLANDE Sam   budesonide-formoterol 2 puff Inhalation BID Kellen Collins MD   cyclobenzaprine 10 mg Oral BID Adrianna ConchitaRICK   ferrous sulfate 325 mg Oral Daily With Breakfast Kellen Collins MD   fluticasone 1 spray Each Nare Daily Radhika Kruse MD   furosemide 40 mg Oral BID (diuretic) Kina Grant MD   heparin (porcine) 5,000 Units Subcutaneous Q8H [facility] Kellen Collins MD   insulin lispro 1-5 Units Subcutaneous TID Mitzi Meeks MD   insulin lispro 1-5 Units Subcutaneous HS Kellen Collins MD   insulin regular 25 Units Subcutaneous Before Donata Baston, YOLANDE   insulin regular 45 Units Subcutaneous BID before breakfast/lunch YOLANDE Mathews   levalbuterol 1 25 mg Nebulization TID Radhika Kruse MD   metoprolol tartrate 25 mg Oral Q12H [address] MD Jeanna   oxyCODONE 5 mg Oral Q4H PRN Kellen Collins MD   oxyCODONE 7 5 mg Oral BID Adrianna ConchitaRICK   oxyCODONE 10 mg Oral Q4H PRN Kellen Collins MD   pantoprazole 40 mg Oral BID AC Lucky Mortimer Psaila, MD   potassium chloride 20 mEq Oral BID Kina Grant MD   pravastatin 40 mg Oral Daily With Kath Amna Meeks MD   sodium chloride 3 mL Nebulization TID Radhika Kruse MD   tiotropium 18 mcg Inhalation Daily Kellen Collins MD        Today, Patient Was Seen By: Nneka Flores MD    ** Please Note: This note has been constructed using a voice recognition system   **

## 2018-02-09 NOTE — OCCUPATIONAL THERAPY NOTE
[address] Evaluation     Patient Name: Smita Palma  Today's Date: 2/9/2018  Problem List  Patient Active Problem List   Diagnosis    Obesity (BMI 30-39  9)    Dyslipidemia    Type 2 diabetes mellitus with complication (HCC)    Venous stasis dermatitis of both lower extremities    Chronic respiratory failure with hypoxia and hypercapnia (HCC)    COPD (chronic obstructive pulmonary disease) (HCC)    Bilateral lower extremity edema    Chronic kidney disease, stage 3    Acute on chronic diastolic congestive heart failure (HCC)    Non-ST elevation myocardial infarction (NSTEMI), type 2 (HCC)    Poor dentition     Past Medical History  Past Medical History:   Diagnosis Date    Abdominal pain     Cardiac disease     COPD (chronic obstructive pulmonary disease) (HCC)     Coronary artery disease     Diabetes mellitus ([facility] [address] )     Hyperlipidemia     Hypertension     MI (myocardial infarction)     with 3 stents    Prostate cancer [facility])      Past Surgical History  Past Surgical History:   Procedure Laterality Date    ABDOMINAL SURGERY      exploratory    ANGIOPLASTY      3 stents    ESOPHAGOGASTRODUODENOSCOPY N/A 10/2/2017    Procedure: ESOPHAGOGASTRODUODENOSCOPY (EGD); Surgeon: Kevin Brumfield MD;  Location: [facility]; Service: Gastroenterology    PROSTATE SURGERY        02/09/18 1252   Note Type   Note type Eval/Treat   Restrictions/Precautions   Weight Bearing Precautions Per Order No   Other Precautions Fall Risk;O2;Telemetry   Pain Assessment   Pain Assessment No/denies pain   Pain Score No Pain   Home Living   Type of Home House   Home Layout Two level;Bed/bath upstairs  (13 steps to bed/bathroom)   Bathroom Shower/Tub Tub/shower unit   Bathroom Toilet Standard   Bathroom Equipment (denies)   [address]; Wheelchair-manual;Electric scooter   Prior Function   Level of [place] Independent with ADLs and functional mobility   Lives With Spouse; Son   Joseluis Help From Family   ADL Assistance Independent   IADLs Needs assistance   Vocational Retired   Lifestyle   Autonomy I in ADL A in IADL   Reciprocal Relationships supportive son and wife   Service to Others retired    Intrinsic Gratification enjoys TV   Psychosocial   Psychosocial (WDL) [address] "these jolts just started"   ADL   Eating Assistance 5  Supervision/Setup  (s/u)   Grooming Assistance 5  Supervision/Setup  (s/u)   [address] 5  Supervision/Setup  (s/u)   LB Dressing Assistance 3  Moderate [address]  3  Moderate Assistance   Additional Comments all UB completed in seated   Bed Mobility   Additional Comments Pt  OOB to chair   Transfers   Sit to Stand 4  Minimal assistance   Additional items Assist x 1; Increased time required;Verbal cues   Stand to Sit 4  Minimal assistance   Additional items Assist x 1; Increased time required;Verbal cues   Functional Mobility   Functional Mobility 3  Moderate assistance   Additional Comments x1   Additional items Rolling walker   Balance   Static Sitting Good   Dynamic Sitting Fair +   Static Standing Fair -   Dynamic Standing Poor +   Ambulatory Poor +   Activity Tolerance   Activity Tolerance Patient limited by fatigue   Nurse Made Aware yes   RUE Assessment   RUE Assessment WFL   LUE Assessment   LUE Assessment WFL   Hand Function   Gross Motor Coordination Functional   Fine Motor Coordination Functional   Sensation   Light Touch No apparent deficits   Cognition   Overall Cognitive Status WFL   Arousal/Participation Alert; Cooperative   Attention Within functional limits   Orientation Level Oriented X4   Memory Within functional limits   Following Commands Follows multistep commands without difficulty   Comments slightly impulsive   Assessment   Limitation Decreased ADL status; Decreased endurance;Decreased self-care trans;Decreased high-level ADLs   Prognosis Good   Assessment Pt  is a 81 y o  male who was [facility] on 2/1/2018 with acute on chronic CHF  Pt  w/a significant PMH of Dm, CKD, CAD, HLD, HTN, hx  Of MI, obesity  Pt  currently lives in [address] [place] [address]  With wife and son  PTA, pt  (I) in all ADLs/IADLs  Pt did use SPC for functional mobility  Currently, pt  requires mod A x1 (with chair follow) for functional mobility and min A x1 for transfers, s/u for UB ADLs and mod A for LB ADLs  A is needed d/t the following impairments: decreased functional activity tolerance, generalized weakness, deconditioning, decreased balance, +UE tremors, +LE buckling during ambulation, increased impulsivity  Therefore, pt  will benefit from skilled OT services to maximize functional gains, monitor status and prevent decline  Will continue to follow pt  3-5x/week to meet the goals described below in 7-10 days  Recommend STR once medically stable  Goals   Patient Goals none expressed   LTG Time Frame 7-10   Plan   Treatment Interventions ADL retraining;Functional transfer training; Endurance training;Patient/family training;Equipment evaluation/education; Compensatory technique education;Continued evaluation; Energy conservation; Activityengagement   Goal Expiration Date 02/19/18   OT Frequency 3-5x/wk   Recommendation   OT Discharge Recommendation Short Term Rehab   OT - OK to Discharge Yes   Barthel Index   Feeding 10   Bathing 0   Grooming Score 5   Dressing Score 5   Bladder Score 10   Bowels Score 10   Toilet Use Score 5   Transfers (Bed/Chair) Score 10   Mobility (Level Surface) Score 0   Stairs Score 5   Barthel Index Score 60   Modified [place] Scale   Modified [place] Scale 4   GOALS  Patient will perform all functional mobility and transfers at a mod I level with use of the least restrictive device  Pt  will perform bed mobility at a mod I level with G balance and activity tolerance  Pt  will complete LB/UB dressing at a mod I level with AE as needed       Pt  will complete all grooming activities at a mod I level while standing at sink  Pt  will complete a toileting tasks at a mod I level  Pt will complete LB/UB bathing at a mod I level with the use of AE as needed  Patient will participate in 30 minutes of functional activity without any overt signs of fatigue or abnormal vitals to demonstrate G endurance as it is required for ADLs/functional activity     Emilie Lees, MOT, OTR/L

## 2018-02-09 NOTE — CASE MANAGEMENT
Thank you,  [address] [facility] in the [facility] by [address]  Network Utilization Review Department  Phone: [phone]; Fax [phone]  ATTENTION: The Network Utilization Review Department is now centralized for our 7 Facilities  Make a note that we have a new phone and fax numbers for our Department  Please call with any questions or concerns to [phone] LWE carefully follow the prompts so that you are directed to the right person  All voicemails are confidential  Fax any determinations, approvals, denials, and requests for initial or continue stay review clinical to [phone]  Due to HIGH CALL volume, it would be easier if you could please send faxed requests to expedite your requests and in part, help us provide discharge notifications faster            Continued Stay Review     Date:  18 ACUTE MED SURG LEVEL OF CARE    Vital Signs: /59   Pulse 74   Temp 97 8 °F (36 6 °C)   Resp 18   Ht 6' (1 829 m)   Wt 124 kg (272 lb 14 9 oz)   SpO2 96%   BMI 37 02 kg/m²      Temp  (24hrs), Av °F (36 7 °C), Min:98 °F (36 7 °C), Max:98 1 °F (36 7 °C)   HR:  [70-78] 78  Resp:  [18-20] 20  BP: (138-145)/(56-77) 145/65  SpO2:  [91 %-100 %] 95 %  Body mass index is 37 14 kg/m²       Input and Output Summary (last 24 hours):   Last data filed at 18 1324    Gross per 24 hour   Intake             1340 ml   Output             2250 ml   Net             -910 ml       Diet Amni/CHO Controlled; Consistent Carbohydrate Diet Level 2 (5 carb servings/75 grams CHO/meal);  Sodium 2 Gm        IV ACCESS      Medications:   Scheduled Meds:   Current Facility-Administered Medications:  acetaminophen 325 mg Oral BID Kaycee Pirece MD   acetaZOLAMIDE 250 mg Intravenous Q12H [address] Rody Yoon DO   albuterol 2 5 mg Nebulization Q4H PRN Enrrique Mae MD   aspirin 81 mg Oral Daily Enrrique Mae MD   benzocaine  Mucosal 4x Daily PRN Odalys Chow A Tarun EmyYOLANDE   budesonide-formoterol 2 puff Inhalation BID Caterina Barrett MD   cyclobenzaprine 10 mg Oral BID Oskar Coon PA-C   ferrous sulfate 325 mg Oral Daily With Breakfast Caterina Barrett MD   fluticasone 1 spray Each Nare Daily Bandar Burns MD   furosemide 40 mg Oral BID (diuretic) Theodore Titus MD   heparin (porcine) 5,000 Units Subcutaneous [facility] Caterina Barrett MD   insulin lispro 1-5 Units Subcutaneous TID AC Merlin Meeks MD   insulin lispro 1-5 Units Subcutaneous HS Caterina Barrett MD   insulin regular 25 Units Subcutaneous Before Dinner YOLANDE Bernabe   insulin regular 45 Units Subcutaneous BID before breakfast/lunch YOLANDE Flores   levalbuterol 1 25 mg Nebulization TID Bandar Burns MD   metoprolol tartrate 25 mg Oral Q12H [facility] Caterina Barrett MD   oxyCODONE 5 mg Oral Q4H PRN Caterina Barrett MD   oxyCODONE 7 5 mg Oral BID Oskar Coon PA-C   oxyCODONE 10 mg Oral Q4H PRN Caterina Barrett MD   pantoprazole 40 mg Oral BID AC Caterina Barrett MD   potassium chloride 20 mEq Oral BID Theodore Titus MD   pravastatin 40 mg Oral Daily With Pamela Alford MD   sodium chloride 3 mL Nebulization TID Bandar Burns MD   tiotropium 18 mcg Inhalation Daily Caterina Barrett MD       PRN Meds:      albuterol    benzocaine    oxyCODONE    oxyCODONE      LABS/Diagnostic Results:  Results from last 7 days  Lab Units 02/05/18  0451   WBC Thousand/uL 11 46*   HEMOGLOBIN g/dL 13 2   HEMATOCRIT % 44 1   PLATELETS Thousands/uL 173   NEUTROS PCT % 75   LYMPHS PCT % 14   MONOS PCT % 9   EOS PCT % 2       Results from last 7 days  Lab Units 02/08/18  0503   02/05/18  0451   SODIUM mmol/L 134*  < > 133*   POTASSIUM mmol/L 3 7  < > 4 4   CHLORIDE mmol/L 97*  < > 92*   CO2 mmol/L 34*  < > 38*   BUN mg/dL 26*  < > 18   CREATININE mg/dL 1 35*  < > 1 44*   CALCIUM mg/dL 8 6  < > 8 9   TOTAL PROTEIN g/dL  --   --  8 3*   BILIRUBIN TOTAL mg/dL  --   --  0 59   ALK PHOS U/L  --   --  72   ALT U/L  --   --  37   AST U/L  --   --  57*   GLUCOSE RANDOM mg/dL 162*  < > 116         Age/Sex: 81 y o  male     Assessment/Plan:   Assessment:   Principal Problem:    Acute on chronic diastolic congestive heart failure (HCC)  Active Problems:    Obesity (BMI 30-39  9)    Dyslipidemia    Type 2 diabetes mellitus with complication (HCC)    Venous stasis dermatitis of both lower extremities    Chronic respiratory failure with hypoxia and hypercapnia ([facility])    COPD (chronic obstructive pulmonary disease) ([facility])    Bilateral lower extremity edema    Chronic kidney disease, stage 3    Non-ST elevation myocardial infarction (NSTEMI), type 2 (HCC)    Poor dentition        Plan:   · Acute on chronic diastolic congestive heart failure now on oral diuretics , cardiology input noted less than 2 g salt diet  · NSTEMI 2  · Chronic hypoxic respiratory failure continue supplemental oxygen  · Chronic kidney disease stage 3  · COPD stable continue respiratory treatments  · Likely chronic hypercapnic respiratory failure -  likely has undiagnosed obstructive sleep apnea , outpatient sleep study recommended  · Diabetes mellitus type 2 A1c 6 8 monitor Accu-Cheks  · Morbidly obese  · Out of bed as able        VTE Pharmacologic Prophylaxis:   Pharmacologic: Heparin  Mechanical VTE Prophylaxis in Place:  Yes      Current Patient Status: Inpatient   Certification Statement: The patient will continue to require additional inpatient hospital stay as mentioned           Discharge [facility]     CASE MANAGEMENT FOLLOWING CLOSELY FOR ALL DISCHARGE NEEDS

## 2018-02-09 NOTE — PHYSICAL THERAPY NOTE
Physical Therapy Progress Note     02/09/18 1530   Pain Assessment   Pain Assessment No/denies pain   Pain Score No Pain   Restrictions/Precautions   Weight Bearing Precautions Per Order No   Other Precautions Telemetry; Fall Risk   General   Family/Caregiver Present No   Cognition   Overall Cognitive Status WFL   Arousal/Participation Alert; Cooperative   Orientation Level Oriented X4   Following Commands Follows multistep commands without difficulty   Subjective   Subjective states that he  does not want to  walk     fear of falling    Bed Mobility   Supine to Sit (received pt sitting   on EOB )   Balance   Static Sitting Good   Dynamic Sitting Fair +   Activity Tolerance   Activity Tolerance Patient tolerated treatment well   Nurse Made Aware yes   Exercises   THR Sitting;20 reps;AROM; Bilateral   Assessment   Prognosis Good   Problem List Decreased strength;Decreased endurance; Impaired balance;Decreased mobility   Assessment received pt  sitting unsupported on EOB   pt  declined any OOB activity2*  fear of falling     educated pt  on  importance of  ambulation   he continues to decline stating  that he  will try  when his tremors  subside        pt  did  complete  therex arom ble  setaed      will  cont to see for mobility  training       Goals   Patient Goals stay in bed    STG Expiration Date 02/18/18   Treatment Day 1   Plan   Treatment/Interventions LE strengthening/ROM;Spoke to nursing   Progress Slow progress, medical status limitations   PT Frequency 5x/wk   Recommendation   Recommendation Post acute IP rehab   Equipment Recommended Zina Dawkins   PT - OK to Discharge (to rehab when   med ready )     Wendy Chandra, PTA

## 2018-02-10 LAB
ARTERIAL PATENCY WRIST A: YES
BASE EXCESS BLDA CALC-SCNC: 3.1 MMOL/L
GLUCOSE SERPL-MCNC: 126 MG/DL (ref 65–140)
GLUCOSE SERPL-MCNC: 175 MG/DL (ref 65–140)
GLUCOSE SERPL-MCNC: 213 MG/DL (ref 65–140)
GLUCOSE SERPL-MCNC: 240 MG/DL (ref 65–140)
HCO3 BLDA-SCNC: 31.1 MMOL/L (ref 22–28)
NASAL CANNULA: 3
O2 CT BLDA-SCNC: 16.7 ML/DL (ref 16–23)
OXYHGB MFR BLDA: 95 % (ref 94–97)
PCO2 BLDA: 64.5 MM HG (ref 36–44)
PH BLDA: 7.3 [PH] (ref 7.35–7.45)
PO2 BLDA: 91.3 MM HG (ref 75–129)
SPECIMEN SOURCE: ABNORMAL

## 2018-02-10 PROCEDURE — 94640 AIRWAY INHALATION TREATMENT: CPT

## 2018-02-10 PROCEDURE — 82805 BLOOD GASES W/O2 SATURATION: CPT | Performed by: INTERNAL MEDICINE

## 2018-02-10 PROCEDURE — 94762 N-INVAS EAR/PLS OXIMTRY CONT: CPT

## 2018-02-10 PROCEDURE — 94760 N-INVAS EAR/PLS OXIMETRY 1: CPT

## 2018-02-10 PROCEDURE — 99232 SBSQ HOSP IP/OBS MODERATE 35: CPT | Performed by: INTERNAL MEDICINE

## 2018-02-10 PROCEDURE — 36600 WITHDRAWAL OF ARTERIAL BLOOD: CPT

## 2018-02-10 PROCEDURE — 82948 REAGENT STRIP/BLOOD GLUCOSE: CPT

## 2018-02-10 RX ADMIN — INSULIN HUMAN 25 UNITS: 500 INJECTION, SOLUTION SUBCUTANEOUS at 16:35

## 2018-02-10 RX ADMIN — LEVALBUTEROL HYDROCHLORIDE 1.25 MG: 1.25 SOLUTION, CONCENTRATE RESPIRATORY (INHALATION) at 07:11

## 2018-02-10 RX ADMIN — HEPARIN SODIUM 5000 UNITS: 5000 INJECTION, SOLUTION INTRAVENOUS; SUBCUTANEOUS at 15:00

## 2018-02-10 RX ADMIN — ISODIUM CHLORIDE 3 ML: 0.03 SOLUTION RESPIRATORY (INHALATION) at 13:26

## 2018-02-10 RX ADMIN — FUROSEMIDE 40 MG: 40 TABLET ORAL at 08:04

## 2018-02-10 RX ADMIN — HEPARIN SODIUM 5000 UNITS: 5000 INJECTION, SOLUTION INTRAVENOUS; SUBCUTANEOUS at 21:32

## 2018-02-10 RX ADMIN — METOPROLOL TARTRATE 25 MG: 25 TABLET ORAL at 08:03

## 2018-02-10 RX ADMIN — POTASSIUM CHLORIDE 20 MEQ: 1500 TABLET, EXTENDED RELEASE ORAL at 08:04

## 2018-02-10 RX ADMIN — LEVALBUTEROL HYDROCHLORIDE 1.25 MG: 1.25 SOLUTION, CONCENTRATE RESPIRATORY (INHALATION) at 19:29

## 2018-02-10 RX ADMIN — POTASSIUM CHLORIDE 20 MEQ: 1500 TABLET, EXTENDED RELEASE ORAL at 18:07

## 2018-02-10 RX ADMIN — INSULIN HUMAN 45 UNITS: 500 INJECTION, SOLUTION SUBCUTANEOUS at 08:00

## 2018-02-10 RX ADMIN — ACETAMINOPHEN 325 MG: 325 TABLET, FILM COATED ORAL at 08:06

## 2018-02-10 RX ADMIN — OXYCODONE HYDROCHLORIDE 7.5 MG: 5 TABLET ORAL at 08:04

## 2018-02-10 RX ADMIN — PRAVASTATIN SODIUM 40 MG: 40 TABLET ORAL at 16:35

## 2018-02-10 RX ADMIN — INSULIN LISPRO 2 UNITS: 100 INJECTION, SOLUTION INTRAVENOUS; SUBCUTANEOUS at 12:30

## 2018-02-10 RX ADMIN — ISODIUM CHLORIDE 3 ML: 0.03 SOLUTION RESPIRATORY (INHALATION) at 19:29

## 2018-02-10 RX ADMIN — ACETAMINOPHEN 325 MG: 325 TABLET, FILM COATED ORAL at 18:07

## 2018-02-10 RX ADMIN — METOPROLOL TARTRATE 25 MG: 25 TABLET ORAL at 21:32

## 2018-02-10 RX ADMIN — CYCLOBENZAPRINE HYDROCHLORIDE 10 MG: 10 TABLET, FILM COATED ORAL at 08:04

## 2018-02-10 RX ADMIN — INSULIN LISPRO 1 UNITS: 100 INJECTION, SOLUTION INTRAVENOUS; SUBCUTANEOUS at 08:00

## 2018-02-10 RX ADMIN — INSULIN LISPRO 1 UNITS: 100 INJECTION, SOLUTION INTRAVENOUS; SUBCUTANEOUS at 21:32

## 2018-02-10 RX ADMIN — ISODIUM CHLORIDE 3 ML: 0.03 SOLUTION RESPIRATORY (INHALATION) at 07:12

## 2018-02-10 RX ADMIN — INSULIN HUMAN 45 UNITS: 500 INJECTION, SOLUTION SUBCUTANEOUS at 12:30

## 2018-02-10 RX ADMIN — PANTOPRAZOLE SODIUM 40 MG: 40 TABLET, DELAYED RELEASE ORAL at 08:00

## 2018-02-10 RX ADMIN — BUDESONIDE AND FORMOTEROL FUMARATE DIHYDRATE 2 PUFF: 160; 4.5 AEROSOL RESPIRATORY (INHALATION) at 18:08

## 2018-02-10 RX ADMIN — HEPARIN SODIUM 5000 UNITS: 5000 INJECTION, SOLUTION INTRAVENOUS; SUBCUTANEOUS at 05:11

## 2018-02-10 RX ADMIN — CYCLOBENZAPRINE HYDROCHLORIDE 10 MG: 10 TABLET, FILM COATED ORAL at 18:08

## 2018-02-10 RX ADMIN — FLUTICASONE PROPIONATE 1 SPRAY: 50 SPRAY, METERED NASAL at 08:07

## 2018-02-10 RX ADMIN — OXYCODONE HYDROCHLORIDE 7.5 MG: 5 TABLET ORAL at 18:08

## 2018-02-10 RX ADMIN — LEVALBUTEROL HYDROCHLORIDE 1.25 MG: 1.25 SOLUTION, CONCENTRATE RESPIRATORY (INHALATION) at 13:26

## 2018-02-10 RX ADMIN — FUROSEMIDE 40 MG: 40 TABLET ORAL at 16:34

## 2018-02-10 RX ADMIN — BUDESONIDE AND FORMOTEROL FUMARATE DIHYDRATE 2 PUFF: 160; 4.5 AEROSOL RESPIRATORY (INHALATION) at 08:06

## 2018-02-10 RX ADMIN — Medication 325 MG: at 08:00

## 2018-02-10 RX ADMIN — ASPIRIN 81 MG: 81 TABLET, COATED ORAL at 08:04

## 2018-02-10 RX ADMIN — TIOTROPIUM BROMIDE 18 MCG: 18 CAPSULE ORAL; RESPIRATORY (INHALATION) at 08:06

## 2018-02-10 RX ADMIN — PANTOPRAZOLE SODIUM 40 MG: 40 TABLET, DELAYED RELEASE ORAL at 16:35

## 2018-02-10 NOTE — PHYSICAL THERAPY NOTE
Physical Therapy Cancellation Note    Attempted to see pt 2 times this morning  On first attempt at 11:08 AM RN was changing LE dressings  Second attempt x 12:01 PM pt eating lunch and requested therapist return later or tomorrow  Will f/u as able      Raven Frausto, PT

## 2018-02-10 NOTE — PROGRESS NOTES
Progress Note - Pulmonary   Earlyne Adolfo Sr  81 y o  male MRN: [id number]  Unit/Bed#: [facility] 530-01 Encounter: [id number]    Assessment:  1  Chronic hypercapnec respiratory failure  2  Rt pleural effusion    Plan:  · Pt reluctantly agrees to trial of nocturnal BIPAP  · He also reluctantly agrees to thoracentesis    ----------------------------------------------------------------------------------------------------------------------    HPI/Interval History:   Condition is about the same  He has little memory of our visit yesterday  Thankfully his wife is here and she encouraged him to follow my advice for both the noct BIPAP and thoracentesis  Vitals:   Blood pressure 127/60, pulse 74, temperature 98 4 °F (36 9 °C), temperature source Oral, resp  rate 18, height 6' (1 829 m), weight 124 kg (272 lb 7 8 oz), SpO2 97 %  ,Body mass index is 36 96 kg/m²  SpO2: 97 %  SpO2 Activity: At Rest  O2 Device: Nasal cannula    Physical Exam:   Gen: Alert and without respiratory distress  HEENT: PERRL  O/P: clear  no erythema or exudate  Neck: Supple  There is no JVD, no LAD or thyromegaly appreciated  Trachea is midline  Chest: diminished but without wheezes, rales  Cardiac: regular without murmurs  Abdomen: Soft and nontender  Bowel sounds are present    Extremities: without edema, stasis dermatitis due to venous insufficiency      Labs:    CBC:    Results from last 7 days  Lab Units 02/05/18  0451 02/04/18  0433   WBC Thousand/uL 11 46* 8 49   HEMOGLOBIN g/dL 13 2 12 3   HEMATOCRIT % 44 1 42 3   PLATELETS Thousands/uL 173 148*       CMP:     Results from last 7 days  Lab Units 02/09/18  0534 02/08/18  0503 02/07/18  0509  02/05/18  0451   SODIUM mmol/L 138 134* 136  < > 133*   POTASSIUM mmol/L 4 3 3 7 3 9  < > 4 4   CHLORIDE mmol/L 98* 97* 95*  < > 92*   CO2 mmol/L 36* 34* 35*  < > 38*   BUN mg/dL 26* 26* 25  < > 18   CREATININE mg/dL 1 43* 1 35* 1 46*  < > 1 44*   CALCIUM mg/dL 8 5 8 6 8 8  < > 8 9   TOTAL PROTEIN g/dL --   --   --   --  8 3*   BILIRUBIN TOTAL mg/dL  --   --   --   --  0 59   ALK PHOS U/L  --   --   --   --  72   ALT U/L  --   --   --   --  37   AST U/L  --   --   --   --  57*   GLUCOSE RANDOM mg/dL 168* 162* 166*  < > 116   < > = values in this interval not displayed  Imaging studies:  Nocturnal oximetry from last night shows persistent desats even on his usual supplemental O2       Ulysses Gaming MD    Portions of the record may have been created with voice recognition software  Occasional wrong word or "sound a like" substitutions may have occurred due to the inherent limitations of voice recognition software  Read the chart carefully and recognize, using context, where substitutions have occurred

## 2018-02-10 NOTE — SOCIAL WORK
CM spoke with patient's wife Iliana Sheridan ([phone]) regarding SNF choices  Pt's wife request referrals to [facility]  Referrals placed in [facility]  CM will follow

## 2018-02-10 NOTE — PLAN OF CARE
Problem: Potential for Falls  Goal: Patient will remain free of falls  INTERVENTIONS:  - Assess patient frequently for physical needs  -  Identify cognitive and physical deficits and behaviors that affect risk of falls  -  [place] fall precautions as indicated by assessment   - Educate patient/family on patient safety including physical limitations  - Instruct patient to call for assistance with activity based on assessment  - Modify environment to reduce risk of injury  - Consider OT/PT consult to assist with strengthening/mobility   Outcome: Progressing      Problem: CARDIOVASCULAR - ADULT  Goal: Maintains optimal cardiac output and hemodynamic stability  INTERVENTIONS:  - Monitor I/O, vital signs and rhythm  - Monitor for S/S and trends of decreased cardiac output i e  bleeding, hypotension  - Administer and titrate ordered vasoactive medications to optimize hemodynamic stability  - Assess quality of pulses, skin color and temperature  - Assess for signs of decreased coronary artery perfusion - ex   Angina  - Instruct patient to report change in severity of symptoms   Outcome: Progressing    Goal: Absence of cardiac dysrhythmias or at baseline rhythm  INTERVENTIONS:  - Continuous cardiac monitoring, monitor vital signs, obtain 12 lead EKG if indicated  - Administer antiarrhythmic and heart rate control medications as ordered  - Monitor electrolytes and administer replacement therapy as ordered   Outcome: Progressing      Problem: RESPIRATORY - ADULT  Goal: Achieves optimal ventilation and oxygenation  INTERVENTIONS:  - Assess for changes in respiratory status  - Assess for changes in mentation and behavior  - Position to facilitate oxygenation and minimize respiratory effort  - Oxygen administration by appropriate delivery method based on oxygen saturation (per order) or ABGs  - Initiate smoking cessation education as indicated  - Encourage broncho-pulmonary hygiene including cough, deep breathe, Incentive Spirometry  - Assess the need for suctioning and aspirate as needed  - Assess and instruct to report SOB or any respiratory difficulty  - Respiratory Therapy support as indicated   Outcome: Progressing      Problem: METABOLIC, FLUID AND ELECTROLYTES - ADULT  Goal: Electrolytes maintained within normal limits  INTERVENTIONS:  - Monitor labs and assess patient for signs and symptoms of electrolyte imbalances  - Administer electrolyte replacement as ordered  - Monitor response to electrolyte replacements, including repeat lab results as appropriate  - Instruct patient on fluid and nutrition as appropriate   Outcome: Progressing    Goal: Fluid balance maintained  INTERVENTIONS:  - Monitor labs and assess for signs and symptoms of volume excess or deficit  - Monitor I/O and WT  - Instruct patient on fluid and nutrition as appropriate   Outcome: Progressing      Problem: SKIN/TISSUE INTEGRITY - ADULT  Goal: Skin integrity remains intact  INTERVENTIONS  - Identify patients at risk for skin breakdown  - Assess and monitor skin integrity  - Assess and monitor nutrition and hydration status  - Monitor labs (i e  albumin)  - Assess for incontinence   - Turn and reposition patient  - Assist with mobility/ambulation  - Relieve pressure over bony prominences  - Avoid friction and shearing  - Provide appropriate hygiene as needed including keeping skin clean and dry  - Evaluate need for skin moisturizer/barrier cream  - Collaborate with interdisciplinary team (i e  Nutrition, Rehabilitation, etc )   - Patient/family teaching   Outcome: Progressing    Goal: Incision(s), wounds(s) or drain site(s) healing without S/S of infection  INTERVENTIONS  - Assess and document risk factors for skin impairment   - Assess and document dressing, incision, wound bed, drain sites and surrounding tissue  - Initiate Nutrition services consult and/or wound management as needed   Outcome: Progressing    Goal: Oral mucous membranes remain intact  INTERVENTIONS  - Assess oral mucosa and hygiene practices  - Implement preventative oral hygiene regimen  - Implement oral medicated treatments as ordered  - Initiate Nutrition services referral as needed   Outcome: Progressing

## 2018-02-10 NOTE — PROGRESS NOTES
[facility] Progress Note  Patient: Smita Palma  81 y o  male   MRN: [id number]  PCP: Nina Mata MD  Unit/Bed#: [facility] 530-01 Encounter: [id number]  Date Of Visit: 02/10/18    Assessment:    Principal Problem:    Acute on chronic diastolic congestive heart failure ([address] )  Active Problems:    Obesity (BMI 30-39  9)    Dyslipidemia    Type 2 diabetes mellitus with complication (HCC)    Venous stasis dermatitis of both lower extremities    Chronic respiratory failure with hypoxia and hypercapnia (HCC)    COPD (chronic obstructive pulmonary disease) ([facility])    Bilateral lower extremity edema    Chronic kidney disease, stage 3    Non-ST elevation myocardial infarction (NSTEMI), type 2 (HCC)    Poor dentition      Plan:    · Acute on chronic diastolic congestive heart failure continue p o  Lasix less than 2 g salt diet  · NSTEMI 2  · Chronic hypoxic and hypercapnic respiratory failure continue respiratory treatments, supplemental oxygen to keep O2 sats more than 88%  · Respiratory acidosis with hypercapnia will likely need BiPAP on discharge, pulmonary following  · Asterixis likely due to carbon dioxide narcosis, patient has been taken off gabapentin as possible contributor, continue to monitor  · Ambulatory dysfunction multifactorial   Recommends inpatient rehab  · Chronic kidney disease stage 3 monitor kidney function closely  · Morbidly obese  · Diabetes mellitus type 2 A1c 6 8 monitor Accu-Cheks  · Out of bed as able ambulation as able       VTE Pharmacologic Prophylaxis:   Pharmacologic: Heparin  Mechanical VTE Prophylaxis in Place: Yes    Patient Centered Rounds: I have performed bedside rounds with nursing staff today  Discussions with Specialists or Other Care Team Provider:     Education and Discussions with Family / Patient: pt, called and updated wife Isa Iglesias over phone    Time Spent for Care: 30 minutes    More than 50% of total time spent on counseling and coordination of care as described above  Current Length of Stay: 9 day(s)    Current Patient Status: Inpatient   Certification Statement: The patient will continue to require additional inpatient hospital stay due to as mentioned    Discharge Plan / Estimated Discharge Date: pending placement discussed with case management     Code Status: Level 1 - Full Code      Subjective:     Reports feeling better  Dyspnea improving  Lower extremity swelling better    Objective:     Vitals:   Temp (24hrs), Av 5 °F (36 4 °C), Min:97 5 °F (36 4 °C), Max:97 5 °F (36 4 °C)    HR:  [73-85] 73  Resp:  [20] 20  BP: (128-134)/(59-70) 134/59  SpO2:  [92 %-98 %] 96 %  Body mass index is 36 96 kg/m²  Input and Output Summary (last 24 hours):        Intake/Output Summary (Last 24 hours) at 02/10/18 1507  Last data filed at 02/10/18 1433   Gross per 24 hour   Intake             1390 ml   Output             2075 ml   Net             -685 ml       Physical Exam:     Physical Exam     Obese  Short thick neck  Lungs diminished breath sounds at bases  Heart sounds S1-S2 noted distant  Abdominal obesity noted soft nontender  Pulses present  Chronic venous stasis changes noted, pedal edema improved  Awake alert obeys simple commands  No rash    Additional Data:     Labs:      Results from last 7 days  Lab Units 18  0451   WBC Thousand/uL 11 46*   HEMOGLOBIN g/dL 13 2   HEMATOCRIT % 44 1   PLATELETS Thousands/uL 173   NEUTROS PCT % 75   LYMPHS PCT % 14   MONOS PCT % 9   EOS PCT % 2       Results from last 7 days  Lab Units 18  0534  18  0451   SODIUM mmol/L 138  < > 133*   POTASSIUM mmol/L 4 3  < > 4 4   CHLORIDE mmol/L 98*  < > 92*   CO2 mmol/L 36*  < > 38*   BUN mg/dL 26*  < > 18   CREATININE mg/dL 1 43*  < > 1 44*   CALCIUM mg/dL 8 5  < > 8 9   TOTAL PROTEIN g/dL  --   --  8 3*   BILIRUBIN TOTAL mg/dL  --   --  0 59   ALK PHOS U/L  --   --  72   ALT U/L  --   --  37   AST U/L  --   --  57*   GLUCOSE RANDOM mg/dL 168*  < > 116   < > = values in this interval not displayed  * I Have Reviewed All Lab Data Listed Above  * Additional Pertinent Lab Tests Reviewed:  All Labs Within Last 24 Hours Reviewed    Imaging:    Imaging Reports Reviewed Today Include:   Imaging Personally Reviewed by Myself Includes:     Recent Cultures (last 7 days):           Last 24 Hours Medication List:     Current Facility-Administered Medications:  acetaminophen 325 mg Oral BID Rajat Jimenez MD   albuterol 2 5 mg Nebulization Q4H PRN Marybeth Quiles MD   aspirin 81 mg Oral Daily Marybeth Quiles MD   benzocaine  Mucosal 4x Daily PRN Cloteal YOLANDE Sanchez   budesonide-formoterol 2 puff Inhalation BID Marybeth Quiles MD   cyclobenzaprine 10 mg Oral BID Mary Jane Ordoñez PA-C   ferrous sulfate 325 mg Oral Daily With Breakfast Marybeth Quiles MD   fluticasone 1 spray Each Nare Daily Rajat Jimenez MD   furosemide 40 mg Oral BID (diuretic) Keren Barrientos MD   heparin (porcine) 5,000 Units Subcutaneous Q8H [address] Marybeth Quiles MD   insulin lispro 1-5 Units Subcutaneous TID Leela Meeks MD   insulin lispro 1-5 Units Subcutaneous HS Vaishnavi Meeks MD   insulin regular 25 Units Subcutaneous Before Dinner YOLANDE Bernabe   insulin regular 45 Units Subcutaneous BID before breakfast/lunch YOLANDE Kim   levalbuterol 1 25 mg Nebulization TID Rajat Jmienez MD   metoprolol tartrate 25 mg Oral Q12H Ånhult Alden eMeks MD   oxyCODONE 5 mg Oral Q4H PRN Marybeth Quiles MD   oxyCODONE 7 5 mg Oral BID Mary Jane Ordoñez PA-C   oxyCODONE 10 mg Oral Q4H PRN Marybeth Quiles MD   pantoprazole 40 mg Oral BID AC Vaishnavi Meeks MD   potassium chloride 20 mEq Oral BID Keren Barrientos MD   pravastatin 40 mg Oral Daily With Jovanny Childress MD   sodium chloride 3 mL Nebulization TID Rajat Jimenez MD   tiotropium 18 mcg Inhalation Daily Marybeth Quiles MD        Today, Patient Was Seen By: Ny Hassan MD    ** Please Note: This note has been constructed using a voice recognition system   **

## 2018-02-11 PROBLEM — R60.0 BILATERAL LOWER EXTREMITY EDEMA: Chronic | Status: RESOLVED | Noted: 2017-07-24 | Resolved: 2018-02-11

## 2018-02-11 PROBLEM — R25.1 TREMORS OF NERVOUS SYSTEM: Status: ACTIVE | Noted: 2018-02-11

## 2018-02-11 LAB
GLUCOSE SERPL-MCNC: 155 MG/DL (ref 65–140)
GLUCOSE SERPL-MCNC: 184 MG/DL (ref 65–140)
GLUCOSE SERPL-MCNC: 193 MG/DL (ref 65–140)
GLUCOSE SERPL-MCNC: 252 MG/DL (ref 65–140)

## 2018-02-11 PROCEDURE — 99232 SBSQ HOSP IP/OBS MODERATE 35: CPT | Performed by: HOSPITALIST

## 2018-02-11 PROCEDURE — 97116 GAIT TRAINING THERAPY: CPT

## 2018-02-11 PROCEDURE — 94640 AIRWAY INHALATION TREATMENT: CPT

## 2018-02-11 PROCEDURE — 94760 N-INVAS EAR/PLS OXIMETRY 1: CPT

## 2018-02-11 PROCEDURE — 82948 REAGENT STRIP/BLOOD GLUCOSE: CPT

## 2018-02-11 PROCEDURE — 99231 SBSQ HOSP IP/OBS SF/LOW 25: CPT | Performed by: PSYCHIATRY & NEUROLOGY

## 2018-02-11 PROCEDURE — 97110 THERAPEUTIC EXERCISES: CPT

## 2018-02-11 RX ADMIN — ISODIUM CHLORIDE 3 ML: 0.03 SOLUTION RESPIRATORY (INHALATION) at 13:12

## 2018-02-11 RX ADMIN — INSULIN HUMAN 45 UNITS: 500 INJECTION, SOLUTION SUBCUTANEOUS at 07:36

## 2018-02-11 RX ADMIN — ACETAMINOPHEN 325 MG: 325 TABLET, FILM COATED ORAL at 17:53

## 2018-02-11 RX ADMIN — METOPROLOL TARTRATE 25 MG: 25 TABLET ORAL at 21:09

## 2018-02-11 RX ADMIN — HEPARIN SODIUM 5000 UNITS: 5000 INJECTION, SOLUTION INTRAVENOUS; SUBCUTANEOUS at 21:08

## 2018-02-11 RX ADMIN — INSULIN LISPRO 1 UNITS: 100 INJECTION, SOLUTION INTRAVENOUS; SUBCUTANEOUS at 07:33

## 2018-02-11 RX ADMIN — OXYCODONE HYDROCHLORIDE 7.5 MG: 5 TABLET ORAL at 17:54

## 2018-02-11 RX ADMIN — FUROSEMIDE 40 MG: 40 TABLET ORAL at 15:02

## 2018-02-11 RX ADMIN — TIOTROPIUM BROMIDE 18 MCG: 18 CAPSULE ORAL; RESPIRATORY (INHALATION) at 09:22

## 2018-02-11 RX ADMIN — CYCLOBENZAPRINE HYDROCHLORIDE 10 MG: 10 TABLET, FILM COATED ORAL at 17:53

## 2018-02-11 RX ADMIN — INSULIN LISPRO 2 UNITS: 100 INJECTION, SOLUTION INTRAVENOUS; SUBCUTANEOUS at 12:21

## 2018-02-11 RX ADMIN — HEPARIN SODIUM 5000 UNITS: 5000 INJECTION, SOLUTION INTRAVENOUS; SUBCUTANEOUS at 06:12

## 2018-02-11 RX ADMIN — FUROSEMIDE 40 MG: 40 TABLET ORAL at 07:34

## 2018-02-11 RX ADMIN — BUDESONIDE AND FORMOTEROL FUMARATE DIHYDRATE 2 PUFF: 160; 4.5 AEROSOL RESPIRATORY (INHALATION) at 17:53

## 2018-02-11 RX ADMIN — ISODIUM CHLORIDE 3 ML: 0.03 SOLUTION RESPIRATORY (INHALATION) at 07:17

## 2018-02-11 RX ADMIN — PANTOPRAZOLE SODIUM 40 MG: 40 TABLET, DELAYED RELEASE ORAL at 06:12

## 2018-02-11 RX ADMIN — POTASSIUM CHLORIDE 20 MEQ: 1500 TABLET, EXTENDED RELEASE ORAL at 17:54

## 2018-02-11 RX ADMIN — ASPIRIN 81 MG: 81 TABLET, COATED ORAL at 09:20

## 2018-02-11 RX ADMIN — POTASSIUM CHLORIDE 20 MEQ: 1500 TABLET, EXTENDED RELEASE ORAL at 09:21

## 2018-02-11 RX ADMIN — INSULIN LISPRO 1 UNITS: 100 INJECTION, SOLUTION INTRAVENOUS; SUBCUTANEOUS at 21:09

## 2018-02-11 RX ADMIN — HEPARIN SODIUM 5000 UNITS: 5000 INJECTION, SOLUTION INTRAVENOUS; SUBCUTANEOUS at 15:00

## 2018-02-11 RX ADMIN — INSULIN HUMAN 25 UNITS: 500 INJECTION, SOLUTION SUBCUTANEOUS at 17:00

## 2018-02-11 RX ADMIN — INSULIN HUMAN 45 UNITS: 500 INJECTION, SOLUTION SUBCUTANEOUS at 12:00

## 2018-02-11 RX ADMIN — METOPROLOL TARTRATE 25 MG: 25 TABLET ORAL at 09:20

## 2018-02-11 RX ADMIN — LEVALBUTEROL HYDROCHLORIDE 1.25 MG: 1.25 SOLUTION, CONCENTRATE RESPIRATORY (INHALATION) at 07:17

## 2018-02-11 RX ADMIN — OXYCODONE HYDROCHLORIDE 7.5 MG: 5 TABLET ORAL at 09:20

## 2018-02-11 RX ADMIN — PRAVASTATIN SODIUM 40 MG: 40 TABLET ORAL at 17:00

## 2018-02-11 RX ADMIN — FLUTICASONE PROPIONATE 1 SPRAY: 50 SPRAY, METERED NASAL at 09:22

## 2018-02-11 RX ADMIN — LEVALBUTEROL HYDROCHLORIDE 1.25 MG: 1.25 SOLUTION, CONCENTRATE RESPIRATORY (INHALATION) at 13:12

## 2018-02-11 RX ADMIN — INSULIN LISPRO 1 UNITS: 100 INJECTION, SOLUTION INTRAVENOUS; SUBCUTANEOUS at 17:00

## 2018-02-11 RX ADMIN — BUDESONIDE AND FORMOTEROL FUMARATE DIHYDRATE 2 PUFF: 160; 4.5 AEROSOL RESPIRATORY (INHALATION) at 09:22

## 2018-02-11 RX ADMIN — LEVALBUTEROL HYDROCHLORIDE 1.25 MG: 1.25 SOLUTION, CONCENTRATE RESPIRATORY (INHALATION) at 19:43

## 2018-02-11 RX ADMIN — ACETAMINOPHEN 325 MG: 325 TABLET, FILM COATED ORAL at 09:20

## 2018-02-11 RX ADMIN — PANTOPRAZOLE SODIUM 40 MG: 40 TABLET, DELAYED RELEASE ORAL at 15:02

## 2018-02-11 RX ADMIN — ISODIUM CHLORIDE 3 ML: 0.03 SOLUTION RESPIRATORY (INHALATION) at 19:43

## 2018-02-11 RX ADMIN — CYCLOBENZAPRINE HYDROCHLORIDE 10 MG: 10 TABLET, FILM COATED ORAL at 09:20

## 2018-02-11 RX ADMIN — Medication 325 MG: at 07:34

## 2018-02-11 NOTE — ASSESSMENT & PLAN NOTE
Secondary to COPD/CHF/JACQUELINE  Has pulmonary specialist outpatient Dr Ecueda  Never had a sleep study  Stable  Continue oxygen supplementation, bronchodilation  Recommended by Pulmonary specialist to use BiPAP, but feels claustrophobic when he uses the mask  Discussion was made in detail for the use and importance of the BiPAP mask with relation to his chronic conditions/complications  He is unwilling to use the BiPAP as recommended

## 2018-02-11 NOTE — PROGRESS NOTES
Progress Note - Jude Boudreaux Sr  1945, 81 y o  male MRN: [id number]    Unit/Bed#: [facility] 530-01 Encounter: [id number]    Primary Care Provider: Anne Finley MD   Date and time admitted to hospital: 2/1/2018  1:22 PM        * Acute on chronic diastolic congestive heart failure ([facility] )   Assessment & Plan    · noncompliant with Lasix prior to coming in   · C/w lasix 40mg b i d   · Oxygen supplementation via nasal cannula  · Follow-up with cardiologist outpatient Dr Monet Level of nervous system   Assessment & Plan    Evaluated by Neurology  Likely due to Neurontin side effect- narcotic side effect  Unlikely to be seizure  Gabapentin discontinued  Continue to monitor        Poor dentition   Assessment & Plan    · Poor oral hygiene  · Outpatient f/u with dentist after discharge         Non-ST elevation myocardial infarction (NSTEMI), type 2 ([facility] )   Assessment & Plan    · History of CAD status post PCI with stents in RCA and diagonal 1 In the setting of heart failure on this admission  · Cardiology following, no further w/u        Chronic kidney disease, stage 3   Assessment & Plan    · Creat baseline appears to be ~1 2-1 4  · BUN/Cr stable  · Continue furosemide 40 mg bid  · Avoid any further nephrotoxic drugs or hypotension        COPD (chronic obstructive pulmonary disease) ([facility])   Assessment & Plan    · Stable  · Continue symbicort, spiriva, nebs  · Pt is on home o2= 2-3 liters NC  · Quit smoking 6 mos ago  · Follows outpt with Dr Lashawn Stern        Chronic respiratory failure with hypoxia and hypercapnia ([facility] )   Assessment & Plan    Secondary to COPD/CHF/JACQUELINE  Has pulmonary specialist outpatient Dr Euceda  Never had a sleep study  Stable  Continue oxygen supplementation, bronchodilation  Recommended by Pulmonary specialist to use BiPAP, but feels claustrophobic when he uses the mask  Discussion was made in detail for the use and importance of the BiPAP mask with relation to his chronic conditions/complications  He is unwilling to use the BiPAP as recommended        Venous stasis dermatitis of both lower extremities   Assessment & Plan    Continue venous compression stockings, keep legs clean and dry bilaterally  Neurontin discontinued, likely related to the lower extremity swelling as a side effect according to patient  Edema resolved  Continue dry dressings of the lower extremities bilaterally        Type 2 diabetes mellitus with complication (HCC)   Assessment & Plan    · Chronic hyperglcemia  · humulin R to 45 units with breakfast and lunch and 25 units with dinner, ISS  · Continue blood glucose monitoring        Dyslipidemia   Assessment & Plan    Stable  Continue statin        Obesity (BMI 30-39  9)   Assessment & Plan    · Morbid obesity- BMI 39  · Lifestyle modification  · Counseling provided for diet exercise and weight loss            VTE Pharmacologic Prophylaxis:   Pharmacologic: Heparin  Mechanical VTE Prophylaxis in Place: No    Patient Centered Rounds: I have performed bedside rounds with nursing staff today  Discussions with Specialists or Other Care Team Provider:  Yes    Education and Discussions with Family / Patient:  Yes  Time Spent for Care: 45 minutes  More than 50% of total time spent on counseling and coordination of care as described above      Current Length of Stay: 10 day(s)    Current Patient Status: Inpatient   Certification Statement: The patient will continue to require additional inpatient hospital stay due to Congestive heart failure exacerbation, improved    Discharge Plan:  Patient is medically stable to be discharged to Subacute rehab    Code Status: Level 1 - Full Code      Subjective:   No complaints of shortness of breath, chest pain  Lower extremity edema improved  Continues to have tremors  Review of systems negative otherwise    Objective:     Vitals:   Temp (24hrs), Av 4 °F (36 9 °C), Min:98 3 °F (36 8 °C), Max:98 5 °F (36 9 °C)    HR:  [72-77] 72  Resp:  [18] 18  BP: (125-134)/(60-67) 134/67  SpO2:  [94 %-98 %] 98 %  Body mass index is 36 69 kg/m²  Input and Output Summary (last 24 hours): Intake/Output Summary (Last 24 hours) at 02/11/18 0931  Last data filed at 02/11/18 0912   Gross per 24 hour   Intake             1640 ml   Output             2350 ml   Net             -710 ml       Physical Exam:     Physical Exam   Constitutional: He is oriented to person, place, and time  He appears well-nourished  No distress  Obese, Fine tremors all over body   HENT:   Head: Normocephalic and atraumatic  Mouth/Throat: Oropharynx is clear and moist    Eyes: EOM are normal  Pupils are equal, round, and reactive to light  Neck: Normal range of motion  Neck supple  No JVD present  Cardiovascular: Normal rate and normal heart sounds  Exam reveals no gallop and no friction rub  No murmur heard  Reduced distal pulses DP, bilaterally   Pulmonary/Chest: Effort normal  No respiratory distress  He has no wheezes  He has no rales  Diminished breath sounds bilaterally   Abdominal: Soft  Bowel sounds are normal  He exhibits no distension  There is no tenderness  There is no rebound and no guarding  Musculoskeletal: Normal range of motion  He exhibits no edema or tenderness  Lymphadenopathy:     He has no cervical adenopathy  Neurological: He is alert and oriented to person, place, and time  No cranial nerve deficit  Tremors generalized   Skin: Skin is warm and dry  No erythema  Bilateral dry excoriations in the lower extremities bilaterally   Psychiatric: He has a normal mood and affect   His behavior is normal  Judgment and thought content normal        Additional Data:     Labs:      Results from last 7 days  Lab Units 02/05/18  0451   WBC Thousand/uL 11 46*   HEMOGLOBIN g/dL 13 2   HEMATOCRIT % 44 1   PLATELETS Thousands/uL 173   NEUTROS PCT % 75   LYMPHS PCT % 14   MONOS PCT % 9   EOS PCT % 2       Results from last 7 days  Lab Units 02/09/18  0534  02/05/18  0451   SODIUM mmol/L 138  < > 133*   POTASSIUM mmol/L 4 3  < > 4 4   CHLORIDE mmol/L 98*  < > 92*   CO2 mmol/L 36*  < > 38*   BUN mg/dL 26*  < > 18   CREATININE mg/dL 1 43*  < > 1 44*   CALCIUM mg/dL 8 5  < > 8 9   TOTAL PROTEIN g/dL  --   --  8 3*   BILIRUBIN TOTAL mg/dL  --   --  0 59   ALK PHOS U/L  --   --  72   ALT U/L  --   --  37   AST U/L  --   --  57*   GLUCOSE RANDOM mg/dL 168*  < > 116   < > = values in this interval not displayed  * I Have Reviewed All Lab Data Listed Above  * Additional Pertinent Lab Tests Reviewed:  María Bearden Admission Reviewed    Imaging:  Recent Cultures (last 7 days):           Last 24 Hours Medication List:     Current Facility-Administered Medications:  acetaminophen 325 mg Oral BID Jovi Ireland MD   albuterol 2 5 mg Nebulization Q4H PRN Drake Snow MD   aspirin 81 mg Oral Daily Drake Snow MD   benzocaine  Mucosal 4x Daily PRN YOLANDE Calloway   budesonide-formoterol 2 puff Inhalation BID Drake Snow MD   cyclobenzaprine 10 mg Oral BID Estefani Moran PA-C   ferrous sulfate 325 mg Oral Daily With Breakfast Drake Snow MD   fluticasone 1 spray Each Nare Daily Jovi Ireland MD   furosemide 40 mg Oral BID (diuretic) Cassie Tuttle MD   heparin (porcine) 5,000 Units Subcutaneous Q8H [facility] Drake Snow MD   insulin lispro 1-5 Units Subcutaneous TID Rickey Meeks MD   insulin lispro 1-5 Units Subcutaneous HS Drake Snow MD   insulin regular 25 Units Subcutaneous Before Dinner YOLANDE Bernabe   insulin regular 45 Units Subcutaneous BID before breakfast/lunch YOLANDE Whitfield   levalbuterol 1 25 mg Nebulization TID Jovi Ireland MD   metoprolol tartrate 25 mg Oral Q12H Sung Meeks MD   oxyCODONE 5 mg Oral Q4H PRN Drake Snow MD   oxyCODONE 7 5 mg Oral BID Estefani Moran PA-C   oxyCODONE 10 mg Oral Q4H PRN Drake MD Gwendolyn   pantoprazole 40 mg Oral BID Yoselin Garcia MD   potassium chloride 20 mEq Oral BID Cassie Tuttle MD   pravastatin 40 mg Oral Daily With Aliyah Perry MD   sodium chloride 3 mL Nebulization TID Jovi Ireland MD   tiotropium 18 mcg Inhalation Daily Drake Snow MD        Today, Patient Was Seen By: Jovi Ireland MD    ** Please Note: Dictation voice to text software may have been used in the creation of this document   **

## 2018-02-11 NOTE — ASSESSMENT & PLAN NOTE
· Stable  · Continue symbicort, spiriva, nebs  · Pt is on home o2= 2-3 liters NC  · Quit smoking 6 mos ago  · Follows outpt with Dr Krista Daigle

## 2018-02-11 NOTE — ASSESSMENT & PLAN NOTE
· Morbid obesity- BMI 39  · Lifestyle modification  · Counseling provided for diet exercise and weight loss

## 2018-02-11 NOTE — PHYSICAL THERAPY NOTE
Physical Therapy Progress Note     02/11/18 0940   Pain Assessment   Pain Assessment 0-10   Pain Score 8   Pain Type Chronic pain   Pain Location Back   Hospital Pain Intervention(s) Ambulation/increased activity   Response to Interventions tolerated well   Restrictions/Precautions   Weight Bearing Precautions Per Order No   Other Precautions Fall Risk;O2;Cardiac/sternal   General   Chart Reviewed Yes   Response to Previous Treatment Patient with no complaints from previous session  Family/Caregiver Present No   Cognition   Overall Cognitive Status WFL   Arousal/Participation Alert; Cooperative   Attention Within functional limits   Orientation Level Oriented X4   Following Commands Follows one step commands without difficulty   Subjective   Subjective Initially states not wanting to walk, secondary to fear of falling  Transfers   Sit to Stand 5  Supervision   Additional items Verbal cues   Stand to Sit 5  Supervision   Additional items Verbal cues   Ambulation/Elevation   Gait pattern Inconsistent george; Short stride; Excessively slow   Gait Assistance 4  Minimal assist   Additional items Assist x 2  (2nd assist was chair follow if needed)   Assistive Device Bariatric Rolling walker   Distance 90 ft x2  (sitting rest break in between)   Balance   Static Sitting Good   Dynamic Sitting Fair +   Static Standing Fair -   Dynamic Standing Fair -   Endurance Deficit   Endurance Deficit Yes   Endurance Deficit Description fatigue   Activity Tolerance   Activity Tolerance Patient tolerated treatment well   Nurse Made Aware appropriate to see   Exercises   Hip Flexion Sitting;20 reps;AROM; Bilateral   Hip Abduction Sitting;20 reps;AROM; Bilateral   Hip Adduction Sitting;20 reps;AROM; Bilateral   Knee AROM Long Arc Quad Sitting;20 reps;AROM; Bilateral   Ankle Pumps Sitting;20 reps;AROM; Bilateral   Assessment   Prognosis Good   Problem List Decreased strength;Decreased endurance; Impaired balance;Decreased mobility;Obesity; Decreased safety awareness   Assessment Patient reports fear of falling, not intially willing to attempt ambulation, however patient then becoming agreeable  Patient able to ambulate 90 ft x2, min assist, with RW  Chair follow was on stand by secondary to previous note of lower extremity buckling  No knee buckling noted with ambulation  Cues required for safety upon turning and sitting for RW management and awareness to surroundings  Patient would continue to benefit from physical therapy to improve functional mobility and safety  Goals   Patient Goals to go home   STG Expiration Date 02/18/18   Treatment Day 2   Plan   Treatment/Interventions LE strengthening/ROM; Endurance training;Gait training   Progress Progressing toward goals   PT Frequency 5x/wk   Recommendation   Recommendation Post acute IP rehab  (when medically cleared)   Equipment Recommended Sydney Santizo, SOLO

## 2018-02-11 NOTE — OCCUPATIONAL THERAPY NOTE
Occupational Therapy Cancel Note:    Occupational therapy attempted however pt refused despite significant education of depth and scope of OT services  OT will attempt again next treatment       Lina Lung

## 2018-02-11 NOTE — PROGRESS NOTES
The patient seen and examined  No acute complaints  He was able to ambulate length of the gomez and back without his knees buckling  Admits subjective improvement in his asterixis  Vital signs reviewed  Afebrile, normotensive, pulse 70s  Correctly oriented x3  Speaks fluently without aphasia  Motor strength intact throughout all 4 limbs  Minimal if any persisting asterixis  He does have some action tremor on finger-to-nose testing, minor, more consistent with essential tremor now  Impression:  Asterixis, likely the result of toxic effect of Neurontin (with or without interaction with Percocet)  Clearly improved compared with initial evaluation, able to ambulate without his knees buckling  No additional neurologic workup required  He could follow up in our office if residual essential tremor progresses or asterixis returns    We will sign off-appreciate the opportunity to have participated in his care

## 2018-02-11 NOTE — ASSESSMENT & PLAN NOTE
· Creat baseline appears to be ~1 2-1 4  · BUN/Cr stable  · Continue furosemide 40 mg bid  · Avoid any further nephrotoxic drugs or hypotension

## 2018-02-11 NOTE — RESTORATIVE TECHNICIAN NOTE
Restorative Specialist Mobility Note       Activity: Ambulate in gomez (Assisted PT with chair follow in hallway, please refer to PT notes for all information )     Assistive Device: Front wheel walker     Ambulation Response: Tolerated fairly well  Repositioned: Sitting (Pt left sitting on EOB)     Range of Motion: Active, All extremities      Pt left resting comfortably on edge of bed, with PT present in room

## 2018-02-11 NOTE — ASSESSMENT & PLAN NOTE
Evaluated by Neurology  Likely due to Neurontin side effect- narcotic side effect  Unlikely to be seizure  Gabapentin discontinued  Continue to monitor

## 2018-02-11 NOTE — PLAN OF CARE
Problem: PHYSICAL THERAPY ADULT  Goal: Performs mobility at highest level of function for planned discharge setting  See evaluation for individualized goals  Treatment/Interventions: Functional transfer training, LE strengthening/ROM, Elevations, Therapeutic exercise, Endurance training, Gait training, Spoke to MD, Spoke to nursing  Equipment Recommended: Amy Guaman (at this time)       See flowsheet documentation for full assessment, interventions and recommendations  Outcome: Progressing  Prognosis: Good  Problem List: Decreased strength, Decreased endurance, Impaired balance, Decreased mobility, Obesity, Decreased safety awareness  Assessment: Patient reports fear of falling, not intially willing to attempt ambulation, however patient then becoming agreeable  Patient able to ambulate 90 ft x2, min assist, with RW  Chair follow was on stand by secondary to previous note of lower extremity buckling  No knee buckling noted with ambulation  Cues required for safety upon turning and sitting for RW management and awareness to surroundings  Patient would continue to benefit from physical therapy to improve functional mobility and safety  Barriers to Discharge:  (multiple steps in the house)     Recommendation: Post acute IP rehab (when medically cleared)          See flowsheet documentation for full assessment

## 2018-02-11 NOTE — ASSESSMENT & PLAN NOTE
· noncompliant with Lasix prior to coming in   · C/w lasix 40mg b i d   · Oxygen supplementation via nasal cannula  · Follow-up with cardiologist outpatient Dr Addis Garcia

## 2018-02-12 ENCOUNTER — APPOINTMENT (INPATIENT)
Dept: RADIOLOGY | Facility: HOSPITAL | Age: 73
DRG: 291 | End: 2018-02-12
Attending: INTERNAL MEDICINE
Payer: COMMERCIAL

## 2018-02-12 VITALS
WEIGHT: 269.62 LBS | HEART RATE: 90 BPM | OXYGEN SATURATION: 96 % | RESPIRATION RATE: 16 BRPM | DIASTOLIC BLOOD PRESSURE: 74 MMHG | SYSTOLIC BLOOD PRESSURE: 126 MMHG | TEMPERATURE: 98.5 F | HEIGHT: 72 IN | BODY MASS INDEX: 36.52 KG/M2

## 2018-02-12 PROBLEM — I50.33 ACUTE ON CHRONIC DIASTOLIC CONGESTIVE HEART FAILURE (HCC): Status: RESOLVED | Noted: 2018-02-01 | Resolved: 2018-02-12

## 2018-02-12 PROBLEM — I21.A1 NON-ST ELEVATION MYOCARDIAL INFARCTION (NSTEMI), TYPE 2: Status: RESOLVED | Noted: 2018-02-01 | Resolved: 2018-02-12

## 2018-02-12 LAB
GLUCOSE SERPL-MCNC: 138 MG/DL (ref 65–140)
GLUCOSE SERPL-MCNC: 277 MG/DL (ref 65–140)
HISTIOCYTES NFR FLD: 5 %
LDH FLD L TO P-CCNC: 77 U/L
LYMPHOCYTES NFR BLD AUTO: 65 %
MONOCYTES NFR BLD AUTO: 15 %
NEUTS BAND NFR FLD MANUAL: 0 %
NEUTS SEG NFR BLD AUTO: 15 %
SITE: NORMAL
TOTAL CELLS COUNTED SPEC: 100
WBC # FLD MANUAL: 1473 /UL

## 2018-02-12 PROCEDURE — 82948 REAGENT STRIP/BLOOD GLUCOSE: CPT

## 2018-02-12 PROCEDURE — 94640 AIRWAY INHALATION TREATMENT: CPT

## 2018-02-12 PROCEDURE — 87205 SMEAR GRAM STAIN: CPT | Performed by: INTERNAL MEDICINE

## 2018-02-12 PROCEDURE — 87070 CULTURE OTHR SPECIMN AEROBIC: CPT | Performed by: INTERNAL MEDICINE

## 2018-02-12 PROCEDURE — 0W993ZZ DRAINAGE OF RIGHT PLEURAL CAVITY, PERCUTANEOUS APPROACH: ICD-10-PCS | Performed by: RADIOLOGY

## 2018-02-12 PROCEDURE — 32555 ASPIRATE PLEURA W/ IMAGING: CPT | Performed by: RADIOLOGY

## 2018-02-12 PROCEDURE — 89051 BODY FLUID CELL COUNT: CPT | Performed by: INTERNAL MEDICINE

## 2018-02-12 PROCEDURE — 32555 ASPIRATE PLEURA W/ IMAGING: CPT

## 2018-02-12 PROCEDURE — 94760 N-INVAS EAR/PLS OXIMETRY 1: CPT

## 2018-02-12 PROCEDURE — 99239 HOSP IP/OBS DSCHRG MGMT >30: CPT | Performed by: HOSPITALIST

## 2018-02-12 PROCEDURE — 83615 LACTATE (LD) (LDH) ENZYME: CPT | Performed by: INTERNAL MEDICINE

## 2018-02-12 RX ORDER — LEVALBUTEROL 1.25 MG/.5ML
1.25 SOLUTION, CONCENTRATE RESPIRATORY (INHALATION)
Qty: 1 EACH | Refills: 0
Start: 2018-02-12 | End: 2018-03-24

## 2018-02-12 RX ORDER — FLUTICASONE PROPIONATE 50 MCG
1 SPRAY, SUSPENSION (ML) NASAL DAILY
Qty: 16 G | Refills: 0
Start: 2018-02-13 | End: 2021-03-12 | Stop reason: ALTCHOICE

## 2018-02-12 RX ORDER — OXYCODONE AND ACETAMINOPHEN 7.5; 325 MG/1; MG/1
1 TABLET ORAL EVERY 6 HOURS PRN
Qty: 20 TABLET | Refills: 0 | Status: SHIPPED | OUTPATIENT
Start: 2018-02-12 | End: 2018-02-22

## 2018-02-12 RX ORDER — PRAVASTATIN SODIUM 40 MG
40 TABLET ORAL
Qty: 30 TABLET | Refills: 0
Start: 2018-02-12 | End: 2018-03-24

## 2018-02-12 RX ORDER — POTASSIUM CHLORIDE 20 MEQ/1
20 TABLET, EXTENDED RELEASE ORAL 2 TIMES DAILY
Qty: 60 TABLET | Refills: 0
Start: 2018-02-12 | End: 2018-12-06 | Stop reason: SDUPTHER

## 2018-02-12 RX ADMIN — INSULIN HUMAN 45 UNITS: 500 INJECTION, SOLUTION SUBCUTANEOUS at 12:01

## 2018-02-12 RX ADMIN — Medication 325 MG: at 10:39

## 2018-02-12 RX ADMIN — FUROSEMIDE 40 MG: 40 TABLET ORAL at 10:39

## 2018-02-12 RX ADMIN — HEPARIN SODIUM 5000 UNITS: 5000 INJECTION, SOLUTION INTRAVENOUS; SUBCUTANEOUS at 06:20

## 2018-02-12 RX ADMIN — TIOTROPIUM BROMIDE 18 MCG: 18 CAPSULE ORAL; RESPIRATORY (INHALATION) at 10:38

## 2018-02-12 RX ADMIN — ASPIRIN 81 MG: 81 TABLET, COATED ORAL at 10:39

## 2018-02-12 RX ADMIN — CYCLOBENZAPRINE HYDROCHLORIDE 10 MG: 10 TABLET, FILM COATED ORAL at 10:39

## 2018-02-12 RX ADMIN — INSULIN LISPRO 2 UNITS: 100 INJECTION, SOLUTION INTRAVENOUS; SUBCUTANEOUS at 11:59

## 2018-02-12 RX ADMIN — ISODIUM CHLORIDE 3 ML: 0.03 SOLUTION RESPIRATORY (INHALATION) at 07:14

## 2018-02-12 RX ADMIN — LEVALBUTEROL HYDROCHLORIDE 1.25 MG: 1.25 SOLUTION, CONCENTRATE RESPIRATORY (INHALATION) at 07:14

## 2018-02-12 RX ADMIN — PANTOPRAZOLE SODIUM 40 MG: 40 TABLET, DELAYED RELEASE ORAL at 06:20

## 2018-02-12 RX ADMIN — POTASSIUM CHLORIDE 20 MEQ: 1500 TABLET, EXTENDED RELEASE ORAL at 10:38

## 2018-02-12 RX ADMIN — ACETAMINOPHEN 325 MG: 325 TABLET, FILM COATED ORAL at 10:39

## 2018-02-12 RX ADMIN — METOPROLOL TARTRATE 25 MG: 25 TABLET ORAL at 10:39

## 2018-02-12 RX ADMIN — BUDESONIDE AND FORMOTEROL FUMARATE DIHYDRATE 2 PUFF: 160; 4.5 AEROSOL RESPIRATORY (INHALATION) at 10:38

## 2018-02-12 RX ADMIN — OXYCODONE HYDROCHLORIDE 7.5 MG: 5 TABLET ORAL at 10:39

## 2018-02-12 NOTE — DISCHARGE INSTRUCTIONS
Thoracentesis   WHAT YOU NEED TO KNOW:   A thoracentesis is a procedure to remove extra fluid or air from between your lungs and your inner chest wall  Air or fluid buildup may make it hard for you to breathe  A thoracentesis allows your lungs to expand fully so you can breathe more easily  DISCHARGE INSTRUCTIONS:     Small amount of shoulder pain and bloody sputum is normal after a Thoracentesis  Rest:  Rest when you feel it is needed  Slowly start to do more each day  Return to your daily activities as directed  Resume your normal diet  Small sips of flat soda will help mild nausea  Do not smoke: If you smoke, it is never too late to quit  Ask for information about how to stop smoking if you need help  Contact Interventional Radiology at [phone] [place] PATIENTS: Contact Interventional Radiology at [phone]) Luis Enrique Villarreal PATIENTS: Contact Interventional Radiology at [phone]) if:   · You have a fever  · Your puncture site is red, warm, swollen, or draining pus  · You have questions or concerns about your procedure, medicine, or care  Seek care immediately or call 911 if:   · Severe chest pain with inspiration and shortness of breath    · Large amounts of blood in your sputum    Follow up with your healthcare provider as directed  Heart Failure   WHAT YOU NEED TO KNOW:   What is heart failure? Heart failure (HF) is a condition that does not allow your heart to fill or pump properly  Not enough oxygen in your blood gets to your organs and tissues  HF can occur in the right side, the left side, or both lower chambers of your heart  HF is often caused by damage or injury to your heart  The damage is caused by other heart problems or high blood pressure  HF is a long-term condition that tends to get worse over time  It is important to manage your health to improve your quality of life   HF can be worsened by heavy alcohol use, smoking, diabetes that is not controlled, or obesity  What are the signs and symptoms of HF? The signs and symptoms depend on how severe your HF is  You may have any of the following:  · Difficulty breathing with activity that worsens to difficulty breathing at rest    · Shortness of breath while lying flat    · Severe shortness of breath and coughing at night that usually wakes you     · Chest pain at night    · Periods of no breathing, then breathing fast    · Fatigue or lack of energy (often worsened by physical activity)     · Swelling in your ankles, legs, or abdomen    · Fast heartbeat, purple color around your mouth and nails    · Fingers and toes cool to the touch  How is HF diagnosed? Tell your healthcare provider about your health history and the medicines you take  He or she will ask questions about your shortness of breath and other symptoms  Your healthcare provider will make a diagnosis based on your physical exam, symptoms, and tests  You may need any of the following:  · Blood tests  are used to check for any damage to your heart  Blood tests also give healthcare providers information about your kidney, liver, and thyroid function  The results can also show an infection  · An EKG  test records your heart rhythm and how fast your heart beats  It shows healthcare providers if you have heart block or have had a heart attack  · An echocardiogram  is a type of ultrasound  Sound waves are used to show the structure and function of your heart  · X-ray, CT, or MRI  pictures may be taken of your heart and lungs  The pictures may show the cause of your HF, or blood clots or fluid in your lungs  You may be given contrast liquid before a CT scan or MRI to help healthcare providers see the pictures better  Tell the healthcare provider if you have ever had an allergic reaction to contrast liquid  Do not enter the MRI room with anything metal  Metal can cause serious injury   Tell the healthcare provider if you have any metal in or on your body   How is HF treated? The goals of treatment are to manage and slow, or reverse, heart damage  Your healthcare providers will manage your other health conditions that may be causing your HF  Examples include high blood pressure and hyperlipidemia  Treatment may include the following:  · Medicines  help regulate your heart rhythm, lower your blood pressure, and get rid of extra fluids  · Cardiac rehab  is a program run by specialists who will help you safely strengthen your heart  The program includes exercise, relaxation, stress management, and heart-healthy nutrition  Healthcare providers will also make sure your medicines are helping to reduce your symptoms  · Oxygen  may help you breathe easier if your o  ·   ·   ·   · xygen level is lower than normal  A CPAP machine may be used to keep your airway open while you sleep  · Surgery  can be done to implant a pacemaker in your chest to regulate your heart rhythm  Other types of surgery can open blocked heart vessels, replace a damaged heart valve, or remove scar tissue  What can I do to manage HF? Your quality of life may improve with treatment and the following:  · Do not smoke  Nicotine and other chemicals in cigarettes and cigars can cause lung damage and make HF difficult to manage  Ask your healthcare provider for information if you currently smoke and need help to quit  E-cigarettes or smokeless tobacco still contain nicotine  Talk to your healthcare provider before you use these products  · Do not drink alcohol or take illegal drugs  Alcohol and drugs can worsen your symptoms quickly  · Weigh yourself every morning  Use the same scale, in the same spot  Do this after you use the bathroom, but before you eat or drink anything  Wear the same type of clothing  Do not wear shoes  Record your weight each day so you will notice any sudden weight gain  Swelling and weight gain are signs of fluid retention   If you are overweight, ask how to lose weight safely  · Check your blood pressure and heart rate every day  Ask for more information about how to measure your blood pressure and heart rate correctly  Ask what these numbers should be for you  · Manage any chronic health conditions you have  These include high blood pressure, diabetes, obesity, high cholesterol, metabolic syndrome, and COPD  You will have fewer symptoms if you manage these health conditions  Follow your healthcare provider's recommendations and follow up with him or her regularly  · Eat heart-healthy foods and limit sodium (salt)  An easy way to do this is to eat more fresh fruits and vegetables and fewer canned and processed foods  Replace butter and margarine with heart-healthy oils such as olive oil and canola oil  Other heart-healthy foods include walnuts, whole-grain breads, low-fat dairy products, beans, and lean meats  Fatty fish such as salmon and tuna are also heart healthy  Ask how much salt you can eat each day  Do not use salt substitutes  · Drink liquids as directed  You may need to limit the amount of liquids you drink if you retain fluid  Ask how much liquid to drink each day and which liquids are best for you  · Stay active  If you are not active, your symptoms are likely to worsen quickly  Walking, bicycling, and other types of physical activity help maintain your strength and improve your mood  Physical activity also helps you manage your weight  Work with your healthcare provider to create an exercise plan that is right for you  · Get vaccines as directed  Get a flu shot every year  You may also need the pneumonia vaccine  The flu and pneumonia can be severe for a person who has HF  Vaccines protect you from these infections    Call 911 if:   · You have any of the following signs of a heart attack:      ¨ Squeezing, pressure, or pain in your chest that lasts longer than 5 minutes or returns    ¨ Discomfort or pain in your back, neck, jaw, stomach, or arm     ¨ Trouble breathing    ¨ Nausea or vomiting    ¨ Lightheadedness or a sudden cold sweat, especially with chest pain or trouble breathing    When should I seek immediate care? · You gain 3 or more pounds (1 4 kg) in a day, or more than your healthcare provider says you should  · Your heartbeat is fast, slow, or uneven all the time  When should I contact my healthcare provider? · You have symptoms of worsening HF:      ¨ Shortness of breath at rest, at night, or that is getting worse in any way     ¨ Weight gain of 5 or more pounds (2 2 kg) in a week     ¨ More swelling in your legs or ankles     ¨ Abdominal pain or swelling     ¨ More coughing     ¨ Feeling tired all the time    · You feel hopeless or depressed, or you have lost interest in things you used to enjoy  · You often feel worried or afraid  · You have questions or concerns about your condition or care  CARE AGREEMENT:   You have the right to help plan your care  Learn about your health condition and how it may be treated  Discuss treatment options with your caregivers to decide what care you want to receive  You always have the right to refuse treatment  The above information is an  only  It is not intended as medical advice for individual conditions or treatments  Talk to your doctor, nurse or pharmacist before following any medical regimen to see if it is safe and effective for you  © 2017 [address]  Information is for End User's use only and may not be sold, redistributed or otherwise used for commercial purposes  All illustrations and images included in CareNotes® are the copyrighted property of [employer]  or Onofre Carroll  Chronic Wounds   WHAT YOU NEED TO KNOW:   What is a chronic wound? A chronic wound is a wound that does not heal completely in 6 weeks  A wound is an injury that causes a break in the skin   There may also be damage to nearby tissues  Examples of wounds that can become chronic are deep ulcers (open sores), large burns, and infected cuts  What leads to a chronic wound? Conditions that slow or stop the healing process may lead to a chronic wound  These may include any of the following:  · Poor blood supply or low oxygen  can be caused by low blood pressure, or blocked or narrowed blood vessels  This is more likely if you smoke or have heart or blood vessel disease  Blood, heart, kidney, and lung disease can also decrease the oxygen supply to tissues  · An infection  occurs when bacteria get into your wound  Objects in the wound, such as glass or metal, may bring bacteria into your wound  Dead tissue in your wound may give bacteria a place to grow  Diseases such as diabetes can also increase your risk for infection  · A weak immune system  may lead to a chronic wound  Radiation treatments, poor nutrition, and certain medicines, such as steroids, weaken the immune system  Diseases such as cancer and diabetes can weaken your immune system and make it hard to fight an infection  · Swelling in the tissues around your wound  can cause pressure that decreases blood flow to the area  Tissue swelling may happen with traumatic injuries  It can also happen with conditions that cause decreased blood flow to the area, such as heart failure or blood vessel problems  What signs and symptoms may happen with a chronic wound? · Fever    · The area around your wound is red and warm to the touch    · Milky, yellow, green, or brown pus in the wound     · Bleeding, swelling, or pain in the affected area    · Trouble moving the affected area    · Wound has become larger or deeper    · Dark or black skin around the wound  How is a chronic wound diagnosed? Your healthcare provider will ask about your wound  He or she will ask about your health, the medicines you take, and any past surgeries  He or she will examine the wound and the area around it  Your healthcare provider will check to see how deep the wound is and look for signs of infection  You may also need any of the following:  · Blood tests  are done to see if you have an infection  · A wound culture  is a test of fluid or tissue used to find the cause of your infection  · An x-ray  is a picture of your bones and tissues in the wound area  You may need to have an x-ray if the wound is near a joint or bone  Healthcare providers look for broken bones, or foreign objects such as glass or metal   How is a chronic wound treated? Your treatment depends on where your wound is located and how severe it is  If a medical condition such as diabetes is delaying wound healing, it is important to treat the condition  Healthcare providers may change your treatment over time if your wound still does not heal  Your treatment may also change as your wound heals  You may need any of the following:  · Antibiotics  may be given to prevent or treat an infection caused by bacteria  · Wound care:      ¨ Cleansing  is done by flushing the wound with sterile fluid  Healthcare providers may use a large syringe with a needle or catheter (tube) tip  They may also use a liquid that kills germs  ¨ Debridement  is done to remove anything from the wound that can delay healing and lead to infection  This includes dead tissue, and objects such as small rocks and dirt  Your healthcare provider may cut out the damaged areas in or around the wound  He or she may also drain the wound to clean out pus  Moist bandages may be placed inside the wound, or bandages that contain enzymes may be used  Hydrotherapy (whirlpool treatment) uses water to clean wounds  It may be used to clean and debride burn wounds  ¨ Dressings  are used to protect the wound and promote wound healing  Many kinds of dressings can be used for chronic wounds  An elastic bandage may be wrapped around the wound area to put light pressure on it   The light pressure helps to decrease swelling in tissues around the wound area  Dressings may be in the form of bandages, gauze, films, gels, or foams  They may contain substances to help your wound heal faster  ¨ Negative pressure wound therapy (NPWT)  may also be done  This therapy is also called wound vacuum, or wound vac therapy  A vacuum device uses suction to remove fluid and waste from your wound and pull the edges closer together  NPWT may also increase blood flow and new tissue growth in the wound  · Hyperbaric oxygen therapy  is used to get more oxygen into the area of the wound  The oxygen is given under pressure inside of a hyperbaric or pressure chamber  You may need to have this therapy more than once  What do I need to know about wound care? · Wash your hands before and after you take care of your wound  · Keep the bandage clean and dry  Do not stop using the bandage on your wound unless your healthcare provider says it is okay  · Clean the wound and change the dressing as often as directed by your healthcare provider  What can I do to help my wound heal?   · Eat healthy foods and drink liquids as directed  Healthy foods give your body the nutrients it needs to heal your wound  Liquids prevent dehydration that can decrease the blood supply to your wound  Healthy foods include fruits, vegetables, grains (breads and cereals), dairy, and protein foods  Protein foods include meat, fish, nuts, and soy products  Protein, calories, vitamin C, and zinc help wounds heal  Ask your healthcare provider for more information about the foods you should eat to improve healing  · Do not smoke  If you smoke, it is never too late to quit  Smoking delays wound healing  Smoking also increases your risk for infection after surgery  Ask your healthcare provider for information if you need help quitting  How can I prevent wounds caused by pressure?   Pressure wounds can develop when blood flow to an area is blocked  For example, you sit or lie in the same position without moving and put pressure on your heels  You can prevent pressure wounds by doing any of the following:  · Change your position every 15 minutes while you are sitting  · Change your position every 2 hours while you lie in bed  · Prop your legs on pillows to lift your heels while you are lying down  · Check your skin or have someone else check your skin daily  Check the areas that are common to pressure wounds, such as elbows, heels, and buttocks  Common early signs of pressure wounds are open sores, blisters, or changes in color or temperature  When should I contact my healthcare provider? · You have a fever  · You have increased or new pain, swelling, redness, or bleeding in or around your wound  · You have pus or a foul odor coming from your wound  · Your skin itches or has a rash  · You have open sores, blisters, or changes in the color or temperature of your skin  · You have questions or concerns about your condition or care  CARE AGREEMENT:   You have the right to help plan your care  Learn about your health condition and how it may be treated  Discuss treatment options with your caregivers to decide what care you want to receive  You always have the right to refuse treatment  The above information is an  only  It is not intended as medical advice for individual conditions or treatments  Talk to your doctor, nurse or pharmacist before following any medical regimen to see if it is safe and effective for you  © 2017 [address] Information is for End User's use only and may not be sold, redistributed or otherwise used for commercial purposes  All illustrations and images included in CareNotes® are the copyrighted property of [employer]  or Onofre Carroll  Chronic Kidney Disease   WHAT YOU NEED TO KNOW:   What is chronic kidney disease (CKD)?   CKD is the gradual and permanent loss of kidney function  It is also called chronic kidney failure, or chronic renal insufficiency  Normally, the kidneys remove fluid, chemicals, and waste from your blood  These wastes are turned into urine by your kidneys  CKD may worsen over time and lead to kidney failure  What increases my risk for CKD? Diabetes or obesity    High blood pressure or heart disease    Kidney infections or kidney stones    Autoimmune diseases, such as lupus    An enlarged prostate    NSAIDs, illegal drugs, or smoking    Family history of kidney disease  What are the signs and symptoms of CKD? Your signs and symptoms will depend on how well your kidneys work  You may not have symptoms, or you may have any of the following:  Changes in how often you need to urinate    Swelling in your arms, legs, or feet    Shortness of breath    Fatigue or weakness    Bad or bitter taste in your mouth    Nausea, vomiting, or loss of appetite  How is CKD diagnosed? Blood and urine tests  show how well your kidneys are working  They may also help manage or show the cause of CKD  An ultrasound, CT scan, or MRI  may show the cause of CKD  You may be given contrast liquid to help your kidneys show up better in the pictures  Tell the healthcare provider if you have ever had an allergic reaction to contrast liquid  Do not enter the MRI room with anything metal  Metal can cause serious injury  Tell the healthcare provider if you have any metal in or on your body  A biopsy  is a procedure to remove a small piece of tissue from your kidney  It is done to find the cause of your CKD  How is CKD treated? The goals of treatment are to control your symptoms and prevent your CKD from getting worse  You may need the following:  Medicines  may be given to decrease blood pressure and get rid of extra fluid   You may also receive medicine to manage health conditions that may occur with CKD, such as anemia, diabetes, and heart disease  Dialysis  is a treatment to remove chemicals and waste from your blood when your kidneys can no longer do this  Surgery  may be needed to create an arteriovenous fistula (AVF) in your arm or insert a catheter into your abdomen  This is done so you can receive dialysis  A kidney transplant  may be done if your CKD becomes severe  How can I manage CKD? Maintain a healthy weight  Ask your healthcare provider how much you should weigh  Ask him to help you create a weight loss plan if you are overweight  Exercise 30 to 60 minutes a day, 4 to 7 times a week, or as directed  Ask about the best exercise plan for you  Regular exercise can help you manage CKD, high blood pressure, and diabetes  Follow your healthcare provider's advice about what to eat and drink  He may tell you to eat food low in sodium (salt), potassium, phosphorus, or protein  You may need to see a dietitian if you need help planning meals  Ask how much liquid to drink each day and which liquids are best for you  Limit alcohol  Ask how much alcohol is safe for you to drink  A drink of alcohol is 12 ounces of beer, 5 ounces of wine, or 1½ ounces of liquor  Do not smoke  Nicotine and other chemicals in cigarettes and cigars can cause lung and kidney damage  Ask your healthcare provider for information if you currently smoke and need help to quit  E-cigarettes or smokeless tobacco still contain nicotine  Talk to your healthcare provider before you use these products  Ask your healthcare provider if you need vaccines  Infections such as pneumonia, influenza, and hepatitis can be more harmful or more likely to occur in a person who has CKD  Vaccines reduce your risk of infection with these viruses  When should I seek immediate care? You are confused and very drowsy  You have a seizure  You have shortness of breath  When should I contact my healthcare provider?    You suddenly gain or lose more weight than your healthcare provider has told you is okay  You have itchy skin or a rash  You urinate more or less than you normally do  You have blood in your urine  You have nausea and repeated vomiting  You have fatigue or muscle weakness  You have hiccups that will not stop  You have questions or concerns about your condition or care  CARE AGREEMENT:   You have the right to help plan your care  Learn about your health condition and how it may be treated  Discuss treatment options with your caregivers to decide what care you want to receive  You always have the right to refuse treatment  The above information is an  only  It is not intended as medical advice for individual conditions or treatments  Talk to your doctor, nurse or pharmacist before following any medical regimen to see if it is safe and effective for you  © 2017 [address] Information is for End User's use only and may not be sold, redistributed or otherwise used for commercial purposes  All illustrations and images included in CareNotes® are the copyrighted property of [employer]  or Onofre Carroll  Meal Planning with the Plate Method   WHAT YOU NEED TO KNOW:   What is meal planning with the plate method? Meal planning with the plate method is a simple way for people with diabetes to plan meals  This method can help you eat the right amount of carbohydrates and keep your blood sugar levels under control  Carbohydrates naturally raise your blood sugar level  Your blood sugar level can rise too high if you eat too much carbohydrate at one time  Carbohydrates are found in starches (bread, cereal, starchy vegetables, and beans), fruit, milk, yogurt, and sweets  How do I use the plate method to plan my meals? Draw an imaginary line down the middle of a 9-inch dinner plate  On one side, draw another line to divide that section in half  Your plate will have 3 sections       Fill the largest section of your plate with non-starchy vegetables  These include broccoli, spinach, cucumbers, peppers, cauliflower, and tomatoes  Add a starch to 1 of the small sections of your plate  Starches include pasta, rice, whole-grain bread, tortillas, corn, potatoes, and beans  Add meat or another source of protein to the other small section of your plate  Examples include chicken or turkey without skin, fish, lean beef or pork, low-fat cheese, tofu, or eggs  Add dairy or fruit next your plate if your meal plan allows  Examples of dairy include skim or 1% milk or low-fat yogurt  If you do not drink milk, you may be able to add another serving of starchy food instead  Add a low-calorie or calorie-free drink such as water or unsweetened tea or coffee  What are serving sizes of foods? Non-starchy vegetables:      ½ cup of cooked vegetables or 1 cup of raw vegetables    ½ cup of vegetable juice    Starches:      1 ounce of whole-wheat bread or 1 small (6 inch) flour or corn tortilla    1 small (4 inch) pancake (about ¼ inch thick)    ¾ cup of dry, unsweetened, whole-grain ready-to-eat cereal or ¼ cup of low-fat granola    ½ cup of cooked cereal or oatmeal    ? cup of rice or pasta     ½ cup of corn, green peas, sweet potatoes, or mashed potatoes    ½ cup of cooked beans and peas (garbanzo, casillas, kidney, white, split, black-eyed)    Meat and other protein sources:      3 to 4 ounces of any lean meat, fish, or poultry    ½ cup of tofu or tempeh    1 large egg    1½ ounces (about 2 tablespoons) of nuts or 2 tablespoons of peanut butter    Fruit:      1 small piece of fresh fruit     ½ cup of canned or fresh fruit or unsweetened fruit juice    ¼ cup of dried fruit    Milk and yogurt:      1 cup (8 ounces) of skim or 1% milk    ¾ cup (6 ounces) of plain, non-fat yogurt  What other guidelines should I follow? Limit salt and sugar  Choose and prepare foods and drinks with less salt and added sugars  Use the nutrition information on food labels to help you make healthy choices  The percent daily value listed on the food label tells you whether a food is low or high in certain nutrients  A percent daily value of 5% or less means that the food is low in a nutrient  A percent daily value of 20% or more means that the food is high in a nutrient  Choose healthy fats  Choose healthy fats such as polyunsaturated and monounsaturated fats in place of unhealthy fats  Healthy fats are found in vegetable oils such as soybean, corn, canola, olive, and sunflower oil  Unhealthy fats are saturated fats, trans fats, and cholesterol  Unhealthy fats are found in shortening, butter, stick margarine, and animal fat  Ask your healthcare provider if alcohol is safe for you  and how much is safe for you  If you choose to drink alcohol, drink it with meals  When you drink alcohol on an empty stomach, your blood sugar may fall to a low level  CARE AGREEMENT:   You have the right to help plan your care  Discuss treatment options with your caregivers to decide what care you want to receive  You always have the right to refuse treatment  The above information is an  only  It is not intended as medical advice for individual conditions or treatments  Talk to your doctor, nurse or pharmacist before following any medical regimen to see if it is safe and effective for you  © 2017 [employer] Information is for End User's use only and may not be sold, redistributed or otherwise used for commercial purposes  All illustrations and images included in CareNotes® are the copyrighted property of [employer]  or Onofre Carroll

## 2018-02-12 NOTE — SOCIAL WORK
Cm received response from [facility]  They are willing to accept pt pending auth  Pt and spouse willing to accept bed, cm contacted [facility] and ref# [id number] obtained for admission effective 2/12  [facility] will transport between 2-2:30pm  Pt, spouse and MD aware

## 2018-02-12 NOTE — ASSESSMENT & PLAN NOTE
· Stable  · Continue symbicort, spiriva, nebs  · Pt is on home o2= 2-3 liters NC  · Quit smoking 6 mos ago  · Follows outpt with Dr Jaylan Bocanegra

## 2018-02-12 NOTE — OCCUPATIONAL THERAPY NOTE
Occupational Therapy         Patient Name: Edel Zapata  Today's Date: 2/12/2018        OT consult received and chart review completed  Pt  cx'd this am as pt  Off floor in [place]  Will cont  To follow       BURAK Crabtree, OTR/L

## 2018-02-12 NOTE — ASSESSMENT & PLAN NOTE
Continue venous compression stockings, keep legs clean and dry bilaterally  Neurontin discontinued, likely related to the lower extremity swelling as a side effect   Edema resolved  Continue dry dressings of the lower extremities bilaterally

## 2018-02-12 NOTE — ASSESSMENT & PLAN NOTE
Secondary to COPD/CHF/JACQUELINE  Clinical condition stable  Has pulmonary specialist outpatient Dr Euceda, He never had a sleep study  Continue oxygen supplementation, bronchodilation  Recommended by Pulmonary specialist to use BiPAP, but feels claustrophobic when he uses the mask  Discussion was made in detail for the use and importance of the BiPAP mask with relation to his chronic conditions/complications  He is unwilling to use the BiPAP as recommended

## 2018-02-12 NOTE — DISCHARGE SUMMARY
Discharge- Timmy Part Sr  1945, 81 y o  male MRN: [id number]    Unit/Bed#: [facility] 530-01 Encounter: [id number]    Primary Care Provider: Mohan Grimaldo MD   Date and time admitted to hospital: 2/1/2018  1:22 PM        Tremors of nervous system   Assessment & Plan    Evaluated by Neurology  Likely due to Neurontin side effect-  Interaction with narcotic side effect  Unlikely to be seizure  Gabapentin discontinued  Continue to monitor        Poor dentition   Assessment & Plan    · Poor oral hygiene  · Outpatient f/u with dentist after discharge         Chronic kidney disease, stage 3   Assessment & Plan    · Creat baseline appears to be ~1 2-1 4  · BUN/Cr stable  · Continue furosemide 40 mg bid  · Avoid any further nephrotoxic drugs or hypotension        COPD (chronic obstructive pulmonary disease) ([facility])   Assessment & Plan    · Stable  · Continue symbicort, spiriva, nebs  · Pt is on home o2= 2-3 liters NC  · Quit smoking 6 mos ago  · Follows outpt with Dr Ruby Kothari        Chronic respiratory failure with hypoxia and hypercapnia ([facility] [address] )   Assessment & Plan    Secondary to COPD/CHF/JACQUELINE  Clinical condition stable  Has pulmonary specialist outpatient Dr Euceda, He never had a sleep study  Continue oxygen supplementation, bronchodilation  Recommended by Pulmonary specialist to use BiPAP, but feels claustrophobic when he uses the mask  Discussion was made in detail for the use and importance of the BiPAP mask with relation to his chronic conditions/complications  He is unwilling to use the BiPAP as recommended        Venous stasis dermatitis of both lower extremities   Assessment & Plan    Continue venous compression stockings, keep legs clean and dry bilaterally  Neurontin discontinued, likely related to the lower extremity swelling as a side effect   Edema resolved  Continue dry dressings of the lower extremities bilaterally        Type 2 diabetes mellitus with complication ([facility])   Assessment & Plan    · Chronic hyperglcemia  · humulin R to 45 units with breakfast and lunch and 25 units with dinner, ISS  · Continue blood glucose monitoring        Dyslipidemia   Assessment & Plan    Stable  Continue pravastatin        Obesity (BMI 30-39  9)   Assessment & Plan    · Morbid obesity- BMI 39  · Lifestyle modification  · Counseling provided for diet exercise and weight loss                Consultations During Hospital Stay:  · Pulmonary  · Neurology  · Wound care    Procedures Performed:     · Thoracocentesis 2/12/18:     Significant Findings / Test Results:     XR chest pa & lateral 02/07: Collins Molly right effusion, unchanged  XR chest 02/01: Moderate right pleural effusion and consolidation of the right base which is probably mostly atelectasis   Pneumonia not entirely excluded  Cardiomegaly  Rightward mediastinal shift    Incidental Findings:   · none     Test Results Pending at Discharge (will require follow up):   none   Outpatient Tests Requested:  none  Complications:  none  Reason for Admission:  Shortness of breath  Hospital Course:     Pavel Webster Sr  is a 81 y o  male patient who originally presented to the hospital on 2/1/2018 due to shortness of breath  On admission he was found to have increasing lower extremity edema and shortness of breath over several days prior to admission along with chest pressure  He also noted some increasing weight gain over the past weeks to months about 8-10 lb, and lower extremity edema with skin weeping over 2 days prior to admission patient was admitted for acute exacerbation of Congestive heart failure, IV diuresis started in the ER, and Cardiology consult  He is known to have acute grade 1 diastolic dysfunction with BNP of 15 80 on admission, with a troponin elevation at 0 2- which was excluded to be type 2 NSTEMI , with findings on EKG of right ventricular hypertrophy and RBBB  Most recent echo was in September 2017 with an ejection fraction of 65%    Patient continued to be on a fluid restriction and strict daily weight monitoring, input output measurements  He was noted to have a 5 kg weight change since admission-as per diuresis  Found significant right-sided pleural effusion, which he received thoracocentesis on 02/12/2018  Acute kidney injury was found on admission-with baseline creatinine between 1 1/0 2, kidney function was continued monitored, with BUN creatinine stabilized at 26/1  43  Patient also has significant type 2 diabetes which is controlled on home regimen and was continued in hospital   He was noted to have venous stasis dermatitis, with weeping wounds and wound care was consulted for further help in management  COPD was controlled with bronchodilator therapy, and oxygen continued via nasal cast and a for supplementation  Pulmonology was consulted for underlying chronic hypercapnic respiratory failure and right-sided pleural effusion, but patient is reluctant to agree to nocturnal BiPAP, and never had a sleep study completed to evaluate severity of sleep apnea  He was counseled extensively on the importance of nocturnal BiPAP CPAP  He will be discharged to follow up with outpatient Cardiology and pulmonology  Patient was noted to have tremors-worsen with opioid and Neurontin combination, and Neurology was consulted, and recommended discontinuation of Neurontin  Once Neurontin was discontinued his lower extremity edema resolved significantly  Please see above list of diagnoses and related plan for additional information  Condition at Discharge: good     Discharge Day Visit / Exam:     Subjective:  Shortness of breath resolved  ROS negative otherwise    Vitals: Blood Pressure: 126/74 (02/12/18 0950)  Pulse: 90 (02/12/18 0950)  Temperature: 98 5 °F (36 9 °C) (02/12/18 0734)  Temp Source: Oral (02/11/18 1500)  Respirations: 16 (02/12/18 0950)  Height: 6' (182 9 cm) (02/01/18 1953)  Weight - Scale: 122 kg (269 lb 10 oz) (02/12/18 0500)  SpO2: 96 % (02/12/18 0461)  Exam:   Physical Exam   Constitutional: He is oriented to person, place, and time  No distress  Morbidly obese   HENT:   Head: Normocephalic and atraumatic  Mouth/Throat: Oropharynx is clear and moist  No oropharyngeal exudate  Eyes: Conjunctivae and EOM are normal  Pupils are equal, round, and reactive to light  Neck: Normal range of motion  Neck supple  No JVD present  Cardiovascular: Normal rate  Exam reveals no gallop and no friction rub  No murmur heard  Pulmonary/Chest: Effort normal and breath sounds normal  No respiratory distress  He has no wheezes  He has no rales  He exhibits no tenderness  S/p thoracocentesis  bandaid on right lower post chest wall   Abdominal: Soft  Bowel sounds are normal  He exhibits no distension  There is no tenderness  There is no rebound and no guarding  Musculoskeletal: Normal range of motion  He exhibits no edema, tenderness or deformity  Neurological: He is alert and oriented to person, place, and time  No cranial nerve deficit  Skin: Skin is warm and dry  No rash noted  Dried skin hyperpigmentatin, healing scars   Psychiatric: He has a normal mood and affect  His behavior is normal  Judgment and thought content normal        Discussion with Family: yes    Discharge instructions/Information to patient and family:   See after visit summary for information provided to patient and family  Provisions for Follow-Up Care:  See after visit summary for information related to follow-up care and any pertinent home health orders  Disposition:     Acute Rehab at [address] to [facility] SNF:   · [facility] - Not Applicable to this Patient    Planned Readmission: none     Discharge Statement:  I spent 35 minutes discharging the patient  This time was spent on the day of discharge  I had direct contact with the patient on the day of discharge   Greater than 50% of the total time was spent examining patient, answering all patient questions, arranging and discussing plan of care with patient as well as directly providing post-discharge instructions  Additional time then spent on discharge activities  Discharge Medications:  See after visit summary for reconciled discharge medications provided to patient and family        ** Please Note: This note has been constructed using a voice recognition system **

## 2018-02-12 NOTE — NURSING NOTE
Report called to [address] after pt  Discharged by slim   Discharge instructions, medication list and prescriptions faxed to [facility]

## 2018-02-14 NOTE — CASE MANAGEMENT
Notification of Discharge  This is a Notification of Discharge from our facility [address]  Please be advised that this patient has been discharge from our facility  Below you will find the admission and discharge date and time including the patients disposition  PRESENTATION DATE: 2/1/2018  1:22 PM  IP ADMISSION DATE: 2/1/18 1546  DISCHARGE DATE: 2/12/2018  2:12 PM  DISPOSITION: Released to SNF/TCU/SNU [address] [place] [address] in the [facility] by Onofre Carroll for 2017  Network Utilization Review Department  Phone: [phone]; Fax [phone]  ATTENTION: The Network Utilization Review Department is now centralized for our 7 Facilities  Make a note that we have a new phone and fax numbers for our Department  Please call with any questions or concerns to [phone] and carefully follow the prompts so that you are directed to the right person  All voicemails are confidential  Fax any determinations, approvals, denials, and requests for initial or continue stay review clinical to [phone]  Due to HIGH CALL volume, it would be easier if you could please send faxed requests to expedite your requests and in part, help us provide discharge notifications faster

## 2018-02-15 ENCOUNTER — TELEPHONE (OUTPATIENT)
Dept: CARDIOLOGY CLINIC | Facility: CLINIC | Age: 73
End: 2018-02-15

## 2018-02-15 LAB
BACTERIA SPEC BFLD CULT: NO GROWTH
GRAM STN SPEC: NORMAL

## 2018-02-15 NOTE — TELEPHONE ENCOUNTER
[address] Coordinator Call"Tunde Alcidesfannie states patient is doing "failrly well"  He is on HF COPD and DM pathways  Weights are stable at 270 4 today  NO acute signs or symptoms  He has chronic WINTERS and st his baseline  He is participating in physical therapy  Recommended HF edcuation for self management at discharge as well as Home health nurses  Aware of upcoming appointment with Cardiology and endocrine  Dr Cruz Lerner following there

## 2018-03-07 NOTE — PROGRESS NOTES
Dear Evelyn Goodwin,  My name is Stan and I am a Registered Nurse and Care Coordinator for [facility]  We recently spoke  on the phone regarding your hospital stay and you opted to receive follow-up phone calls from me  Because of the reason that you were in the hospital, Medicare has placed you in a program called [address]  One of the benefits  of the program is that you can have a nurse call regularly to answer any questions or concerns you may have  My phone number is listed below in case you would need to contact me      Sincerely,     [address]  [phone]          Electronically signed by:Aline Lamb RN  Nov 22 2016  4:07PM EST

## 2018-03-07 NOTE — MISCELLANEOUS
History of Present Illness  COPD Subsequent Hospital Discharge Phone Calls: The patient is being contacted for continued follow-up after hospitalization  The patient was discharged from the hospital on 7/21/17  I spoke with patient  Patient was identified as medium risk for readmission per risk stratification  The patient was discharged to home with [address] [place] [address] Way  The patient was seen by his PCP on   The patient was seen by his Pulmonologist on 8/14/17  The patient is a former smoker with a pack year history  mMRC Scale Rating:   II -- Walks slower than people of the same age on level ground because of SOB and/or have to stop for breath when walking at my own pace  Counseling and topics reviewed:  maintenance inhaler and frequency are Symbicort/Spiriva, Albuterol/Pulmicort and importance of continued adherence to medication regimen and physician follow-up  Narrative summary:  Patient is breathing better since hospital discharge  He is struggling with the pain in his ribs/back  He is taking medication as prescribed  He was given inhalers during his Pulmonary appt as he is unable to afford the purchase  He continues to have VNA services in the home  No respiratory difficulty at this time  Current Meds    1  Cyclobenzaprine HCl - 10 MG Oral Tablet; One Tablet twice daily  Requested for:   [id number]; Last Rx:19Jan2017 Ordered    2  Albuterol Sulfate (2 5 MG/3ML) 0 083% Inhalation Nebulization Solution; USE 1 UNIT   DOSE EVERY 4-6 HOURS AS NEEDED FOR WHEEZING ; Therapy: 83FHY6477 to (Last Rx:21Mar2017)  Requested for: 21Mar2017 Ordered   3  Budesonide 0 5 MG/2ML Inhalation Suspension; USE 1 UNIT DOSE VIA NEBULIZER   TWO TIMES A DAY; Therapy: 33Loo1239 to (Last Rx:29Apr2016)  Requested for: 29Apr2016 Ordered    4  Metoprolol Tartrate 25 MG Oral Tablet; Take 1 tablet twice daily  Requested for:   52SCH7000; Last Rx:19Jan2017 Ordered    5   BD Insulin Syringe U-500 31G X 6MM 0 5 ML Miscellaneous; Use 3 per day as directed   with U-500 insulin; Therapy: 40DHC9807 to (Evaluate:58Jxf1676)  Requested for: 53Jpv4666; Last   Rx:15Aug2017 Ordered   6  HumuLIN R U-500 (CONCENTRATED) 500 UNIT/ML Subcutaneous Solution; INJECT 55   UNITS SQ at 8 AM, 55 UNITS at 12 PM, 45   UNITS at 6PM   ****USE WITH U-500 SYRINGE ONLY***;   Therapy: 79AEW7461 to (Evaluate:25Ojx1961)  Requested for: 22Fmd0248; Last   Rx:15Aug2017 Ordered   7  OneTouch Ultra Blue In [employer]; Check BG 4x per day; Therapy: 48PUS6248 to (Evaluate:40Xoe2478)  Requested for: 97TDQ2511; Last   Rx:19Jan2017 Ordered    8  Lisinopril 5 MG Oral Tablet; TAKE 1 TABLET DAILY; Last Rx:12Ifp8100 Ordered    9  Albuterol Sulfate (2 5 MG/3ML) 0 083% Inhalation Nebulization Solution; USE 1 UNIT   DOSE EVERY 4-6 HOURS AS NEEDED FOR WHEEZING ; Therapy: 08Apr2014 to (Last Rx:08Apr2014)  Requested for: 08Apr2014 Ordered    10  Oxycodone-Acetaminophen 7 5-325 MG Oral Tablet (Percocet); TAKE 1 TABLET EVERY 4    HOURS AS NEEDED FOR BREAKTHROUGH PAIN;    Therapy: 98JQP8829 to (Evaluate:31Mar2017); Last Rx:21Mar2017 Ordered   11  Stiolto Respimat 2 5-2 5 MCG/ACT Inhalation Aerosol Solution; 2 puffs once a day; Therapy: 91GBR8469 to (Last Rx:21Mar2017)  Requested for: 21Mar2017 Ordered    12  Aspirin EC 81 MG Oral Tablet Delayed Release; take 1 tablet by mouth every day; Therapy: 61MDK5093 to Recorded   13  Fish Oil CAPS; One capsule twice daily; Therapy: (Son Blackwood) to Recorded   14  Simvastatin 10 MG Oral Tablet; TAKE 1 TABLET DAILY; Therapy: 89Vwg3318 to Recorded    Allergies    1  MetFORMIN HCl TABS    Future Appointments    Date/Time Provider Specialty Site   11/03/2017 11:30 AM ANUJ Dash  Endocrinology [facility]OCR[facility]   10/19/2017 02:20 PM Carlota Rock DO Pulmonary Medicine [facility] PULMONARY [facility]     Signatures   Electronically signed by :  RT Francesca; Aug 21 2017  3:05PM EST (Author)

## 2018-03-13 PROBLEM — E11.40 DIABETIC NEUROPATHY (HCC): Status: ACTIVE | Noted: 2017-01-20

## 2018-03-24 ENCOUNTER — HOSPITAL ENCOUNTER (EMERGENCY)
Facility: HOSPITAL | Age: 73
Discharge: HOME | End: 2018-03-24
Attending: EMERGENCY MEDICINE | Admitting: EMERGENCY MEDICINE
Payer: COMMERCIAL

## 2018-03-24 VITALS
WEIGHT: 279 LBS | TEMPERATURE: 97.9 F | HEART RATE: 78 BPM | RESPIRATION RATE: 20 BRPM | OXYGEN SATURATION: 96 % | SYSTOLIC BLOOD PRESSURE: 118 MMHG | BODY MASS INDEX: 37.84 KG/M2 | DIASTOLIC BLOOD PRESSURE: 51 MMHG

## 2018-03-24 DIAGNOSIS — R09.81 NASAL CONGESTION: Primary | ICD-10-CM

## 2018-03-24 PROCEDURE — 99283 EMERGENCY DEPT VISIT LOW MDM: CPT

## 2018-03-24 PROCEDURE — 93005 ELECTROCARDIOGRAM TRACING: CPT

## 2018-03-24 NOTE — ED PROVIDER NOTES
History  Chief Complaint   Patient presents with    Nasal Drainage     Patient states that wife is currently in the hospital and supposed to be coming home soon  Wants to make sure he is ok for when she comes home  States having a runny nose for the past couple of weeks, denies cough and fevers  70-year-old male with history of COPD, type 2 diabetes, hypertension, hyperlipidemia, coronary artery disease status post PCIs and placement of 3 stents, diastolic congestive heart failure  He presents with nasal congestion  The patient states that he has had a right nose for 2-3 days, and thinks that there is nothing wrong with him, however he is concerned because his wife is being discharged from the hospital on Monday, is on chemotherapy for cancer, and he does not want to get her sick  Patient denies any fevers, chills, chest pain, shortness of breath, abdominal pain  He is chronically on oxygen due to his COPD, however he has not had increased oxygen demand recently  He got his flu shot this year  Prior to Admission Medications   Prescriptions Last Dose Informant Patient Reported? Taking?    Insulin Syringe/Needle U-500 (BD INSULIN SYRINGE U-500) 31G X 6MM 0 5 ML MISC   Yes No   Sig: by Does not apply route   Iron, Ferrous Sulfate, 142 (45 Fe) MG TBCR   Yes Yes   Sig: Take by mouth   Omega-3 Fatty Acids (FISH OIL) 645 MG CAPS   Yes Yes   Sig: Take 1 capsule by mouth 2 (two) times a day   Tiotropium Bromide-Olodaterol (STIOLTO RESPIMAT) 2 5-2 5 MCG/ACT AERS   Yes Yes   Sig: Inhale   albuterol (2 5 mg/3 mL) 0 083 % nebulizer solution   Yes Yes   Sig: Inhale 1 each   aspirin (ECOTRIN LOW STRENGTH) 81 mg EC tablet   Yes Yes   Sig: Take 1 tablet by mouth daily   budesonide (PULMICORT) 0 5 mg/2 mL nebulizer solution   Yes Yes   Sig: Inhale 1 each 2 (two) times a day   budesonide-formoterol (SYMBICORT) 160-4 5 mcg/act inhaler   Yes Yes   Sig: Inhale 2 puffs 2 (two) times a day   cyclobenzaprine (FLEXERIL) 10 mg tablet   No Yes   Sig: Take 1 tablet by mouth daily at bedtime   Patient taking differently: Take 10 mg by mouth 2 (two) times a day     ferrous sulfate 325 (65 Fe) mg tablet   Yes Yes   Sig: Take 325 mg by mouth daily with breakfast   fluticasone (FLONASE) 50 mcg/act nasal spray   No Yes   Si spray into each nostril daily   furosemide (LASIX) 40 mg tablet   No Yes   Sig: Take 1 tablet by mouth daily   glucose blood (ONE TOUCH ULTRA TEST) test strip   Yes No   Sig: by In Vitro route   insulin regular (HumuLIN R U-500) 500 units/mL CONCENTRATED injection   No Yes   Sig: Inject 0 09 mL (45 Units total) under the skin 2 (two) times a day before breakfast and lunch   insulin regular (HumuLIN R U-500) 500 units/mL CONCENTRATED injection   No Yes   Sig: Inject 0 05 mL (25 Units total) under the skin daily before dinner   metoprolol tartrate (LOPRESSOR) 25 mg tablet   Yes Yes   Sig: Take 1 tablet by mouth 2 (two) times a day   oxyCODONE-acetaminophen (PERCOCET) 7 5-325 MG per tablet   Yes Yes   Sig: Take by mouth every 4 (four) hours   pantoprazole (PROTONIX) 40 mg tablet   No Yes   Sig: Take 1 tablet by mouth 2 (two) times a day before meals   potassium chloride (K-DUR,KLOR-CON) 20 mEq tablet   No Yes   Sig: Take 1 tablet (20 mEq total) by mouth 2 (two) times a day   simvastatin (ZOCOR) 10 mg tablet   Yes Yes   Sig: Take 1 tablet by mouth daily   tiotropium (SPIRIVA) 18 mcg inhalation capsule   Yes Yes   Sig: Place 18 mcg into inhaler and inhale daily      Facility-Administered Medications: None       Past Medical History:   Diagnosis Date    Abdominal pain     Cardiac disease     [facility] [address] [facility]     Coronary artery disease     [facility]     Hyperlipidemia     Hypertension     MI (myocardial infarction)     with 3 stents    Prostate [facility])        Past Surgical History:   Procedure Laterality Date    ABDOMINAL SURGERY      exploratory    ANGIOPLASTY      3 stents    ESOPHAGOGASTRODUODENOSCOPY N/A 10/2/2017    Procedure: ESOPHAGOGASTRODUODENOSCOPY (EGD); Surgeon: Valarie Hutchins MD;  Location: [facility]; Service: Gastroenterology    PROSTATE SURGERY         Family History   Problem Relation Age of Onset    Heart disease Father      I have reviewed and agree with the history as documented  Social History   Substance Use Topics    Smoking status: Former Smoker     Packs/day: 1 50     Years: 50 00     Quit date: 9/13/2017    Smokeless tobacco: Never Used    Alcohol use No        Review of Systems   Constitutional: Negative for chills and fever  HENT: Positive for rhinorrhea  Negative for congestion, ear discharge, sinus pain, sore throat and voice change  Eyes: Negative for photophobia and visual disturbance  Respiratory: Negative for cough and shortness of breath  Cardiovascular: Negative for chest pain and palpitations  Gastrointestinal: Negative for abdominal pain, diarrhea, nausea and vomiting  Endocrine: Negative for polyphagia and polyuria  Genitourinary: Negative for frequency  Musculoskeletal: Negative for neck pain and neck stiffness  Skin: Negative for pallor and rash  Neurological: Negative for light-headedness and headaches  Physical Exam  ED Triage Vitals [03/24/18 1324]   Temperature Pulse Respirations Blood Pressure SpO2   97 9 °F (36 6 °C) 87 18 133/60 (!) 83 %      Temp Source Heart Rate Source Patient Position - Orthostatic VS BP Location FiO2 (%)   Oral Monitor Sitting Right arm --      Pain Score       No Pain           Orthostatic Vital Signs  Vitals:    03/24/18 1324 03/24/18 1345   BP: 133/60 118/51   Pulse: 87 78   Patient Position - Orthostatic VS: Sitting Sitting       Physical Exam   Constitutional: He is oriented to person, place, and time  Awake alert, well-appearing, no acute distress  Appears older than stated age  HENT:   Head: Normocephalic     Mouth/Throat: Oropharynx is clear and moist  No oropharyngeal exudate  Eyes: Pupils are equal, round, and reactive to light  No scleral icterus  Neck: Normal range of motion  No JVD present  Cardiovascular: Normal rate, regular rhythm and normal heart sounds  No murmur heard  Pulmonary/Chest: Effort normal  No respiratory distress  He has no wheezes  He has no rales  Abdominal: Soft  He exhibits no distension  There is no tenderness  Musculoskeletal: Normal range of motion  He exhibits no edema  Lymphadenopathy:     He has no cervical adenopathy  Neurological: He is alert and oriented to person, place, and time  He exhibits normal muscle tone  Skin: Skin is warm and dry  No rash noted  ED Medications  Medications - No data to display    Diagnostic Studies  Results Reviewed     None                 No orders to display         Procedures  Procedures      Phone Consults  ED Phone Contact    ED Course  ED Course            Identification of Seniors at [address] [place] [address] Most Recent Value   (ISAR) Identification of Seniors at Risk   Before the illness or injury that brought you to the Emergency, did you need someone to help you on a regular basis? 0 Filed at: 03/24/2018 1326   In the last 24 hours, have you needed more help than usual?  0 Filed at: 03/24/2018 1326   Have you been hospitalized for one or more nights during the past 6 months? 0 Filed at: 03/24/2018 1326   In general, do you see well?  0 Filed at: 03/24/2018 1326   In general, do you have serious problems with your memory? 0 Filed at: 03/24/2018 1326   Do you take more than three different medications every day? 1 Filed at: 03/24/2018 1326   ISAR Score  1 Filed at: 03/24/2018 1326                          MDM  Number of Diagnoses or Management Options  Nasal congestion:   Diagnosis management comments: 82-year-old male presents with nasal discharge, and concern for viral syndrome    The patient currently has no complaints which she wants worked up, but is concerned that he will get his wife, who has cancer and is about to be discharged home, sick  Patient's physical exam is unremarkable, including a normal HEENT exam, clear breath sounds, and normal vital signs  The patient is chronically on oxygen, and his oxygen saturation while on supplemental O2 is 96%  Patient be discharged home  I provided the patient strict return precautions  I also provided the patient with droplet masks which she may use at home to protect his wife in the case that the patient has a viral illness    CritCare Time    Disposition  Final diagnoses:   Nasal congestion     Time reflects when diagnosis was documented in both MDM as applicable and the Disposition within this note     Time User Action Codes Description Comment    3/24/2018  2:20 PM Ochoa Ends Add [R09 81] Nasal congestion       ED Disposition     ED Disposition Condition Comment    Discharge  Sally Olivares Sr  discharge to home/self care      Condition at discharge: Good        Follow-up Information     Follow up With Specialties Details Why [address] [address]  [phone]          Discharge Medication List as of 3/24/2018  2:21 PM      CONTINUE these medications which have NOT CHANGED    Details   albuterol (2 5 mg/3 mL) 0 083 % nebulizer solution Inhale 1 each, Starting Tue 4/8/2014, Historical Med      aspirin (ECOTRIN LOW STRENGTH) 81 mg EC tablet Take 1 tablet by mouth daily, Starting Tue 12/6/2016, Historical Med      budesonide (PULMICORT) 0 5 mg/2 mL nebulizer solution Inhale 1 each 2 (two) times a day, Starting Fri 4/29/2016, Historical Med      budesonide-formoterol (SYMBICORT) 160-4 5 mcg/act inhaler Inhale 2 puffs 2 (two) times a day, Historical Med      cyclobenzaprine (FLEXERIL) 10 mg tablet Take 1 tablet by mouth daily at bedtime, Starting Thu 10/5/2017, Print      ferrous sulfate 325 (65 Fe) mg tablet Take 325 mg by mouth daily with breakfast, Historical Med      fluticasone (FLONASE) 50 mcg/act nasal spray 1 spray into each nostril daily, Starting Tue 2/13/2018, No Print      furosemide (LASIX) 40 mg tablet Take 1 tablet by mouth daily, Starting Thu 10/5/2017, Print      !! insulin regular (HumuLIN R U-500) 500 units/mL CONCENTRATED injection Inject 0 09 mL (45 Units total) under the skin 2 (two) times a day before breakfast and lunch, Starting Mon 2/12/2018, No Print      !! insulin regular (HumuLIN R U-500) 500 units/mL CONCENTRATED injection Inject 0 05 mL (25 Units total) under the skin daily before dinner, Starting Mon 2/12/2018, No Print      Iron, Ferrous Sulfate, 142 (45 Fe) MG TBCR Take by mouth, Historical Med      metoprolol tartrate (LOPRESSOR) 25 mg tablet Take 1 tablet by mouth 2 (two) times a day, Historical Med      Omega-3 Fatty Acids (FISH OIL) 645 MG CAPS Take 1 capsule by mouth 2 (two) times a day, Historical Med      oxyCODONE-acetaminophen (PERCOCET) 7 5-325 MG per tablet Take by mouth every 4 (four) hours, Starting Tue 3/21/2017, Historical Med      pantoprazole (PROTONIX) 40 mg tablet Take 1 tablet by mouth 2 (two) times a day before meals, Starting Thu 10/5/2017, Print      potassium chloride (K-DUR,KLOR-CON) 20 mEq tablet Take 1 tablet (20 mEq total) by mouth 2 (two) times a day, Starting Mon 2/12/2018, No Print      simvastatin (ZOCOR) 10 mg tablet Take 1 tablet by mouth daily, Starting Fri 4/29/2016, Historical Med      tiotropium (SPIRIVA) 18 mcg inhalation capsule Place 18 mcg into inhaler and inhale daily, Historical Med      Tiotropium Bromide-Olodaterol (STIOLTO RESPIMAT) 2 5-2 5 MCG/ACT AERS Inhale, Starting Tue 3/21/2017, Historical Med      glucose blood (ONE TOUCH ULTRA TEST) test strip by In Vitro route, Starting Thu 1/19/2017, Historical Med      Insulin Syringe/Needle U-500 (BD INSULIN SYRINGE U-500) 31G X 6MM 0 5 ML MISC by Does not apply route, Starting Tue 8/15/2017, Historical Med       !! - Potential duplicate medications found  Please discuss with provider  No discharge procedures on file  ED Provider  Attending physically available and evaluated Rickisuresh Dylon Raymundo I managed the patient along with the ED Attending      Electronically Signed by         Floyd Duong MD  03/24/18 3812

## 2018-03-24 NOTE — ED ATTENDING ATTESTATION
Echo Moore MD, saw and evaluated the patient  I have discussed the patient with the resident/non-physician practitioner and agree with the resident's/non-physician practitioner's findings, Plan of Care, and MDM as documented in the resident's/non-physician practitioner's note, except where noted  All available labs and Radiology studies were reviewed  At this point I agree with the current assessment done in the Emergency Department  I have conducted an independent evaluation of this patient a history and physical is as follows: Pt wife is coming home after chemo and he has nasal drainage He wants to be checked out before wife  No fevers   No green nasal discharge no cough no nvd PE: alert nad heart reg lungs clear abd soft nontender tms clear pharynx clear nares mild erythema no discharge   Critical Care Time  CritCare Time    Procedures

## 2018-03-24 NOTE — DISCHARGE INSTRUCTIONS
Cold Symptoms, Ambulatory Care   GENERAL INFORMATION:   Cold symptoms  include sneezing, dry throat, a stuffy nose, headache, watery eyes, and a cough  Your cough may be dry, or you may cough up mucus  You may also have muscle aches, joint pain, and tiredness  Rarely, you may have a fever  Cold symptoms occur from inflammation in your upper respiratory system caused by a virus  Most colds go away without treatment  Seek immediate care for the following symptoms:   · A heartbeat that is much faster than usual for you     · A swollen neck that is sore to the touch     · Increased tiredness and weakness    · Pinpoint or larger reddish-purple dots on your skin     · Poor or no appetite  Treatment for cold symptoms  may include NSAIDS to decrease muscle aches and fever  Do not give NSAID medicines to children under 10months of age without direction from your child's doctor  Cold medicines may also be given to decrease coughing, nasal stuffiness, sneezing, and a runny nose  Do not give cold medicines to children under 11years of age without direction from your child's doctor  Manage your cold symptoms with the following:   · Drink liquids  to help thin and loosen thick mucus so you can cough it up  Liquids will also keep you hydrated  Ask your healthcare provider which liquids are best for you and how much to drink each day  · Do not smoke  because it may worsen your symptoms and increase the length of time you feel sick  Talk with your healthcare provider if you need help to stop smoking  Prevent the spread of germs  by washing your hands often  You can spread your cold germs to others for at least 3 days after your symptoms start  Do not share items, such as eating utensils  Cover your nose and mouth when you cough or sneeze using the crook of your elbow instead of your hands  Throw used tissues in the garbage    Follow up with your healthcare provider as directed:  Write down your questions so you remember to ask them during your visits  CARE AGREEMENT:   You have the right to help plan your care  Learn about your health condition and how it may be treated  Discuss treatment options with your caregivers to decide what care you want to receive  You always have the right to refuse treatment  The above information is an  only  It is not intended as medical advice for individual conditions or treatments  Talk to your doctor, nurse or pharmacist before following any medical regimen to see if it is safe and effective for you  © 2014 [address] is for End User's use only and may not be sold, redistributed or otherwise used for commercial purposes  All illustrations and images included in CareNotes® are the copyrighted property of [employer]  or [address]

## 2018-03-26 PROCEDURE — 93010 ELECTROCARDIOGRAM REPORT: CPT | Performed by: INTERNAL MEDICINE

## 2018-03-28 LAB
ATRIAL RATE: 79 BPM
P AXIS: 260 DEGREES
PR INTERVAL: 186 MS
QRS AXIS: 92 DEGREES
QRSD INTERVAL: 128 MS
QT INTERVAL: 410 MS
QTC INTERVAL: 470 MS
T WAVE AXIS: 57 DEGREES
VENTRICULAR RATE: 79 BPM

## 2018-04-02 RX ORDER — SIMVASTATIN 40 MG
40 TABLET ORAL DAILY
Refills: 5 | COMMUNITY
Start: 2018-03-15 | End: 2020-04-17

## 2018-04-27 ENCOUNTER — APPOINTMENT (EMERGENCY)
Dept: RADIOLOGY | Facility: HOSPITAL | Age: 73
DRG: 291 | End: 2018-04-27
Payer: COMMERCIAL

## 2018-04-27 ENCOUNTER — HOSPITAL ENCOUNTER (INPATIENT)
Facility: HOSPITAL | Age: 73
LOS: 3 days | Discharge: HOME HEALTH | DRG: 291 | End: 2018-04-30
Attending: EMERGENCY MEDICINE | Admitting: HOSPITALIST
Payer: COMMERCIAL

## 2018-04-27 DIAGNOSIS — J41.8 MIXED SIMPLE AND MUCOPURULENT CHRONIC BRONCHITIS (HCC): Chronic | ICD-10-CM

## 2018-04-27 DIAGNOSIS — J44.1 COPD EXACERBATION (HCC): Primary | ICD-10-CM

## 2018-04-27 DIAGNOSIS — I87.2 VENOUS STASIS DERMATITIS OF BOTH LOWER EXTREMITIES: Chronic | ICD-10-CM

## 2018-04-27 DIAGNOSIS — L03.116 CELLULITIS OF LEFT LOWER EXTREMITY: ICD-10-CM

## 2018-04-27 DIAGNOSIS — E11.9 DIABETES MELLITUS (HCC): ICD-10-CM

## 2018-04-27 PROBLEM — J96.21 ACUTE ON CHRONIC RESPIRATORY FAILURE WITH HYPOXIA AND HYPERCAPNIA (HCC): Status: ACTIVE | Noted: 2017-07-23

## 2018-04-27 PROBLEM — J96.22 ACUTE ON CHRONIC RESPIRATORY FAILURE WITH HYPOXIA AND HYPERCAPNIA (HCC): Status: ACTIVE | Noted: 2017-07-23

## 2018-04-27 LAB
ANION GAP SERPL CALCULATED.3IONS-SCNC: 10 MMOL/L (ref 4–13)
ANION GAP SERPL CALCULATED.3IONS-SCNC: 5 MMOL/L (ref 4–13)
ATRIAL RATE: 110 BPM
BASE EX.OXY STD BLDV CALC-SCNC: 89.4 % (ref 60–80)
BASE EXCESS BLDV CALC-SCNC: 7.9 MMOL/L
BASOPHILS # BLD AUTO: 0.01 THOUSANDS/ΜL (ref 0–0.1)
BASOPHILS NFR BLD AUTO: 0 % (ref 0–1)
BUN SERPL-MCNC: 16 MG/DL (ref 5–25)
BUN SERPL-MCNC: 20 MG/DL (ref 5–25)
CALCIUM SERPL-MCNC: 8.3 MG/DL (ref 8.3–10.1)
CALCIUM SERPL-MCNC: 8.6 MG/DL (ref 8.3–10.1)
CHLORIDE SERPL-SCNC: 96 MMOL/L (ref 100–108)
CHLORIDE SERPL-SCNC: 98 MMOL/L (ref 100–108)
CO2 SERPL-SCNC: 31 MMOL/L (ref 21–32)
CO2 SERPL-SCNC: 34 MMOL/L (ref 21–32)
CREAT SERPL-MCNC: 1.48 MG/DL (ref 0.6–1.3)
CREAT SERPL-MCNC: 1.69 MG/DL (ref 0.6–1.3)
EOSINOPHIL # BLD AUTO: 0.11 THOUSAND/ΜL (ref 0–0.61)
EOSINOPHIL NFR BLD AUTO: 1 % (ref 0–6)
ERYTHROCYTE [DISTWIDTH] IN BLOOD BY AUTOMATED COUNT: 16.1 % (ref 11.6–15.1)
GFR SERPL CREATININE-BSD FRML MDRD: 40 ML/MIN/1.73SQ M
GFR SERPL CREATININE-BSD FRML MDRD: 47 ML/MIN/1.73SQ M
GLUCOSE SERPL-MCNC: 325 MG/DL (ref 65–140)
GLUCOSE SERPL-MCNC: 497 MG/DL (ref 65–140)
GLUCOSE SERPL-MCNC: >500 MG/DL (ref 65–140)
HCO3 BLDV-SCNC: 36.8 MMOL/L (ref 24–30)
HCT VFR BLD AUTO: 45.6 % (ref 36.5–49.3)
HGB BLD-MCNC: 13.9 G/DL (ref 12–17)
LYMPHOCYTES # BLD AUTO: 1.42 THOUSANDS/ΜL (ref 0.6–4.47)
LYMPHOCYTES NFR BLD AUTO: 11 % (ref 14–44)
MCH RBC QN AUTO: 26.8 PG (ref 26.8–34.3)
MCHC RBC AUTO-ENTMCNC: 30.5 G/DL (ref 31.4–37.4)
MCV RBC AUTO: 88 FL (ref 82–98)
MONOCYTES # BLD AUTO: 0.61 THOUSAND/ΜL (ref 0.17–1.22)
MONOCYTES NFR BLD AUTO: 5 % (ref 4–12)
NEUTROPHILS # BLD AUTO: 11.28 THOUSANDS/ΜL (ref 1.85–7.62)
NEUTS SEG NFR BLD AUTO: 83 % (ref 43–75)
NRBC BLD AUTO-RTO: 0 /100 WBCS
NT-PROBNP SERPL-MCNC: 523 PG/ML
O2 CT BLDV-SCNC: 18.5 ML/DL
P AXIS: 93 DEGREES
PCO2 BLDV: 71.8 MM HG (ref 42–50)
PH BLDV: 7.33 [PH] (ref 7.3–7.4)
PLATELET # BLD AUTO: 177 THOUSANDS/UL (ref 149–390)
PMV BLD AUTO: 9.8 FL (ref 8.9–12.7)
PO2 BLDV: 76.4 MM HG (ref 35–45)
POTASSIUM SERPL-SCNC: 4.6 MMOL/L (ref 3.5–5.3)
POTASSIUM SERPL-SCNC: 4.9 MMOL/L (ref 3.5–5.3)
QRS AXIS: 127 DEGREES
QRSD INTERVAL: 122 MS
QT INTERVAL: 358 MS
QTC INTERVAL: 484 MS
RBC # BLD AUTO: 5.18 MILLION/UL (ref 3.88–5.62)
SODIUM SERPL-SCNC: 137 MMOL/L (ref 136–145)
SODIUM SERPL-SCNC: 137 MMOL/L (ref 136–145)
T WAVE AXIS: 56 DEGREES
TROPONIN I SERPL-MCNC: <0.02 NG/ML
VENTRICULAR RATE: 110 BPM
WBC # BLD AUTO: 13.52 THOUSAND/UL (ref 4.31–10.16)

## 2018-04-27 PROCEDURE — 36415 COLL VENOUS BLD VENIPUNCTURE: CPT | Performed by: EMERGENCY MEDICINE

## 2018-04-27 PROCEDURE — 94640 AIRWAY INHALATION TREATMENT: CPT

## 2018-04-27 PROCEDURE — 83880 ASSAY OF NATRIURETIC PEPTIDE: CPT | Performed by: EMERGENCY MEDICINE

## 2018-04-27 PROCEDURE — 96375 TX/PRO/DX INJ NEW DRUG ADDON: CPT

## 2018-04-27 PROCEDURE — 80048 BASIC METABOLIC PNL TOTAL CA: CPT | Performed by: EMERGENCY MEDICINE

## 2018-04-27 PROCEDURE — 96365 THER/PROPH/DIAG IV INF INIT: CPT

## 2018-04-27 PROCEDURE — 93010 ELECTROCARDIOGRAM REPORT: CPT | Performed by: INTERNAL MEDICINE

## 2018-04-27 PROCEDURE — 94760 N-INVAS EAR/PLS OXIMETRY 1: CPT

## 2018-04-27 PROCEDURE — 99223 1ST HOSP IP/OBS HIGH 75: CPT | Performed by: HOSPITALIST

## 2018-04-27 PROCEDURE — 93005 ELECTROCARDIOGRAM TRACING: CPT

## 2018-04-27 PROCEDURE — 85025 COMPLETE CBC W/AUTO DIFF WBC: CPT | Performed by: EMERGENCY MEDICINE

## 2018-04-27 PROCEDURE — 82948 REAGENT STRIP/BLOOD GLUCOSE: CPT

## 2018-04-27 PROCEDURE — 99285 EMERGENCY DEPT VISIT HI MDM: CPT

## 2018-04-27 PROCEDURE — 94644 CONT INHLJ TX 1ST HOUR: CPT

## 2018-04-27 PROCEDURE — 80048 BASIC METABOLIC PNL TOTAL CA: CPT | Performed by: PHYSICIAN ASSISTANT

## 2018-04-27 PROCEDURE — 82805 BLOOD GASES W/O2 SATURATION: CPT | Performed by: EMERGENCY MEDICINE

## 2018-04-27 PROCEDURE — 71046 X-RAY EXAM CHEST 2 VIEWS: CPT

## 2018-04-27 PROCEDURE — 84484 ASSAY OF TROPONIN QUANT: CPT | Performed by: EMERGENCY MEDICINE

## 2018-04-27 RX ORDER — CHLORAL HYDRATE 500 MG
1000 CAPSULE ORAL 2 TIMES DAILY
Status: DISCONTINUED | OUTPATIENT
Start: 2018-04-27 | End: 2018-04-30 | Stop reason: HOSPADM

## 2018-04-27 RX ORDER — SODIUM CHLORIDE FOR INHALATION 0.9 %
3 VIAL, NEBULIZER (ML) INHALATION ONCE
Status: COMPLETED | OUTPATIENT
Start: 2018-04-27 | End: 2018-04-27

## 2018-04-27 RX ORDER — SODIUM CHLORIDE FOR INHALATION 0.9 %
3 VIAL, NEBULIZER (ML) INHALATION
Status: DISCONTINUED | OUTPATIENT
Start: 2018-04-28 | End: 2018-04-30 | Stop reason: HOSPADM

## 2018-04-27 RX ORDER — FUROSEMIDE 40 MG/1
40 TABLET ORAL DAILY
Status: DISCONTINUED | OUTPATIENT
Start: 2018-04-28 | End: 2018-04-30 | Stop reason: HOSPADM

## 2018-04-27 RX ORDER — FERROUS SULFATE 325(65) MG
325 TABLET ORAL
Status: DISCONTINUED | OUTPATIENT
Start: 2018-04-28 | End: 2018-04-30 | Stop reason: HOSPADM

## 2018-04-27 RX ORDER — PRAVASTATIN SODIUM 20 MG
20 TABLET ORAL
Status: DISCONTINUED | OUTPATIENT
Start: 2018-04-28 | End: 2018-04-30 | Stop reason: HOSPADM

## 2018-04-27 RX ORDER — ASPIRIN 81 MG/1
81 TABLET ORAL DAILY
Status: DISCONTINUED | OUTPATIENT
Start: 2018-04-28 | End: 2018-04-30 | Stop reason: HOSPADM

## 2018-04-27 RX ORDER — CYCLOBENZAPRINE HCL 10 MG
10 TABLET ORAL
Status: DISCONTINUED | OUTPATIENT
Start: 2018-04-27 | End: 2018-04-29

## 2018-04-27 RX ORDER — LEVALBUTEROL 1.25 MG/.5ML
1.25 SOLUTION, CONCENTRATE RESPIRATORY (INHALATION) EVERY 6 HOURS PRN
Status: DISCONTINUED | OUTPATIENT
Start: 2018-04-27 | End: 2018-04-30 | Stop reason: HOSPADM

## 2018-04-27 RX ORDER — METHYLPREDNISOLONE SODIUM SUCCINATE 125 MG/2ML
125 INJECTION, POWDER, LYOPHILIZED, FOR SOLUTION INTRAMUSCULAR; INTRAVENOUS ONCE
Status: COMPLETED | OUTPATIENT
Start: 2018-04-27 | End: 2018-04-27

## 2018-04-27 RX ORDER — LEVALBUTEROL 1.25 MG/.5ML
SOLUTION, CONCENTRATE RESPIRATORY (INHALATION)
Status: COMPLETED
Start: 2018-04-27 | End: 2018-04-27

## 2018-04-27 RX ORDER — PANTOPRAZOLE SODIUM 40 MG/1
40 TABLET, DELAYED RELEASE ORAL
Status: DISCONTINUED | OUTPATIENT
Start: 2018-04-28 | End: 2018-04-30 | Stop reason: HOSPADM

## 2018-04-27 RX ORDER — OXYCODONE HYDROCHLORIDE AND ACETAMINOPHEN 5; 325 MG/1; MG/1
1 TABLET ORAL EVERY 6 HOURS PRN
Status: DISCONTINUED | OUTPATIENT
Start: 2018-04-27 | End: 2018-04-30 | Stop reason: HOSPADM

## 2018-04-27 RX ORDER — POTASSIUM CHLORIDE 20 MEQ/1
20 TABLET, EXTENDED RELEASE ORAL 2 TIMES DAILY
Status: DISCONTINUED | OUTPATIENT
Start: 2018-04-27 | End: 2018-04-30 | Stop reason: HOSPADM

## 2018-04-27 RX ORDER — FLUTICASONE PROPIONATE 50 MCG
1 SPRAY, SUSPENSION (ML) NASAL DAILY
Status: DISCONTINUED | OUTPATIENT
Start: 2018-04-28 | End: 2018-04-30 | Stop reason: HOSPADM

## 2018-04-27 RX ORDER — ALBUTEROL SULFATE 2.5 MG/3ML
2.5 SOLUTION RESPIRATORY (INHALATION)
Status: DISCONTINUED | OUTPATIENT
Start: 2018-04-28 | End: 2018-04-27

## 2018-04-27 RX ORDER — SODIUM CHLORIDE FOR INHALATION 0.9 %
3 VIAL, NEBULIZER (ML) INHALATION EVERY 6 HOURS PRN
Status: DISCONTINUED | OUTPATIENT
Start: 2018-04-27 | End: 2018-04-30 | Stop reason: HOSPADM

## 2018-04-27 RX ORDER — LEVALBUTEROL 1.25 MG/.5ML
1.25 SOLUTION, CONCENTRATE RESPIRATORY (INHALATION)
Status: DISCONTINUED | OUTPATIENT
Start: 2018-04-28 | End: 2018-04-30 | Stop reason: HOSPADM

## 2018-04-27 RX ORDER — BUDESONIDE AND FORMOTEROL FUMARATE DIHYDRATE 160; 4.5 UG/1; UG/1
2 AEROSOL RESPIRATORY (INHALATION) 2 TIMES DAILY
Status: DISCONTINUED | OUTPATIENT
Start: 2018-04-27 | End: 2018-04-30 | Stop reason: HOSPADM

## 2018-04-27 RX ORDER — FERROUS SULFATE 325(65) MG
325 TABLET ORAL
Status: DISCONTINUED | OUTPATIENT
Start: 2018-04-28 | End: 2018-04-27 | Stop reason: SDUPTHER

## 2018-04-27 RX ORDER — METHYLPREDNISOLONE SODIUM SUCCINATE 125 MG/2ML
60 INJECTION, POWDER, LYOPHILIZED, FOR SOLUTION INTRAMUSCULAR; INTRAVENOUS EVERY 6 HOURS SCHEDULED
Status: DISCONTINUED | OUTPATIENT
Start: 2018-04-27 | End: 2018-04-28

## 2018-04-27 RX ORDER — BUDESONIDE 0.5 MG/2ML
0.5 INHALANT ORAL
Status: DISCONTINUED | OUTPATIENT
Start: 2018-04-27 | End: 2018-04-30

## 2018-04-27 RX ADMIN — CYCLOBENZAPRINE HYDROCHLORIDE 10 MG: 10 TABLET, FILM COATED ORAL at 22:42

## 2018-04-27 RX ADMIN — POTASSIUM CHLORIDE 20 MEQ: 1500 TABLET, EXTENDED RELEASE ORAL at 22:42

## 2018-04-27 RX ADMIN — AZITHROMYCIN MONOHYDRATE 500 MG: 500 INJECTION, POWDER, LYOPHILIZED, FOR SOLUTION INTRAVENOUS at 15:43

## 2018-04-27 RX ADMIN — METHYLPREDNISOLONE SODIUM SUCCINATE 125 MG: 125 INJECTION, POWDER, FOR SOLUTION INTRAMUSCULAR; INTRAVENOUS at 15:43

## 2018-04-27 RX ADMIN — ALBUTEROL SULFATE 5 MG: 2.5 SOLUTION RESPIRATORY (INHALATION) at 18:06

## 2018-04-27 RX ADMIN — BUDESONIDE 0.5 MG: 0.5 INHALANT RESPIRATORY (INHALATION) at 23:06

## 2018-04-27 RX ADMIN — ISODIUM CHLORIDE 3 ML: 0.03 SOLUTION RESPIRATORY (INHALATION) at 14:58

## 2018-04-27 RX ADMIN — BUDESONIDE AND FORMOTEROL FUMARATE DIHYDRATE 2 PUFF: 160; 4.5 AEROSOL RESPIRATORY (INHALATION) at 23:48

## 2018-04-27 RX ADMIN — LEVALBUTEROL HYDROCHLORIDE 1.25 MG: 1.25 SOLUTION, CONCENTRATE RESPIRATORY (INHALATION) at 23:06

## 2018-04-27 RX ADMIN — INSULIN HUMAN 8 UNITS: 100 INJECTION, SOLUTION PARENTERAL at 23:48

## 2018-04-27 RX ADMIN — Medication 1000 MG: at 22:43

## 2018-04-27 RX ADMIN — OXYCODONE HYDROCHLORIDE AND ACETAMINOPHEN 1 TABLET: 5; 325 TABLET ORAL at 22:44

## 2018-04-27 RX ADMIN — ALBUTEROL SULFATE 10 MG: 2.5 SOLUTION RESPIRATORY (INHALATION) at 14:58

## 2018-04-27 RX ADMIN — METOPROLOL TARTRATE 25 MG: 25 TABLET ORAL at 22:42

## 2018-04-27 RX ADMIN — IPRATROPIUM BROMIDE 1 MG: 0.5 SOLUTION RESPIRATORY (INHALATION) at 14:58

## 2018-04-27 NOTE — ASSESSMENT & PLAN NOTE
Bilateral wheezing, active respiratory distress on clinical exam  Bronchodilator therapy as scheduled  Continue oxygen supplementation, at baseline 3 L nasal cannula, BiPAP 20/12 60% if clinical condition deteriorates  Continue Solu-Medrol 60 q 6h  Pulmonology consult  Peak flow daily  Continue antibiotics for now

## 2018-04-27 NOTE — ED PROVIDER NOTES
History  Chief Complaint   Patient presents with    Shortness of Breath     SOB since this morning, home 02 @ 2 liters     81year-old man with a history of COPD, dCHF, MI s/p stenting, DM, HTN, HLD, and prostate cancer presents for evaluation of dyspnea  Patient notes onset of dyspnea with associated chest pressure last night which is still present  He describes associated productive cough without fevers, chills, nausea, vomiting, diarrhea, URI symptoms, abdominal pain, diarrhea, or urinary symptoms  He uses 3L home O2 which is not increased  He has had worsening B/L LE edema without orthopnea or PND  He has been compliant with home inhalers  Patient had an MI approximately 1 year ago and had to have a pleural effusion drained in February  On arrival, patient is afebrile, hypertensive to 178/80, tachycardic to 104, and tachypneic to 30 with otherwise normal vital signs  He has a normal O2 sat on 3L NC  Physical exam shows mild-to-moderate increased WOB, decreased air movement throughout with bibasilar rales, symmetric LE pitting edema, and is otherwise unremarkable for acute findings  No history of VTE  Concern for COPD vs CHF exacerbation  Will treat empirically with Heart neb, Solumedrol, and azithromycin given new sputum production  Will check cardiac workup including BNP and VBG  Patient declining bipap at this time  Will try HFNC, if needed  Prior to Admission Medications   Prescriptions Last Dose Informant Patient Reported? Taking?    Insulin Syringe/Needle U-500 (BD INSULIN SYRINGE U-500) 31G X 6MM 0 5 ML MISC 4/26/2018 at Unknown time  Yes Yes   Sig: by Does not apply route   Iron, Ferrous Sulfate, 142 (45 Fe) MG TBCR 4/27/2018 at 0730  Yes Yes   Sig: Take by mouth   Omega-3 Fatty Acids (FISH OIL) 645 MG CAPS 4/27/2018 at 0730  Yes Yes   Sig: Take 1 capsule by mouth 2 (two) times a day   Tiotropium Bromide-Olodaterol (STIOLTO RESPIMAT) 2 5-2 5 MCG/ACT AERS 4/27/2018 at 0730  Yes Yes   Sig: Inhale albuterol (2 5 mg/3 mL) 0 083 % nebulizer solution 2018 at 1800  Yes Yes   Sig: Inhale 1 each   aspirin (ECOTRIN LOW STRENGTH) 81 mg EC tablet 2018 at 0730  Yes Yes   Sig: Take 1 tablet by mouth daily   budesonide (PULMICORT) 0 5 mg/2 mL nebulizer solution 2018 at 0730  Yes Yes   Sig: Inhale 1 each 2 (two) times a day   budesonide-formoterol (SYMBICORT) 160-4 5 mcg/act inhaler 2018 at 0730  Yes Yes   Sig: Inhale 2 puffs 2 (two) times a day   cyclobenzaprine (FLEXERIL) 10 mg tablet 2018 at 0730  No Yes   Sig: Take 1 tablet by mouth daily at bedtime   Patient taking differently: Take 10 mg by mouth 2 (two) times a day     ferrous sulfate 325 (65 Fe) mg tablet 2018 at 0730  Yes Yes   Sig: Take 325 mg by mouth daily with breakfast   fluticasone (FLONASE) 50 mcg/act nasal spray Past Week at Unknown time  No Yes   Si spray into each nostril daily   furosemide (LASIX) 40 mg tablet 2018 at Unknown time  No Yes   Sig: Take 1 tablet by mouth daily   glucose blood (ONE TOUCH ULTRA TEST) test strip 2018 at Unknown time  Yes Yes   Sig: by In Vitro route   insulin regular (HumuLIN R U-500) 500 units/mL CONCENTRATED injection 2018 at Unknown time  No Yes   Sig: Inject 0 09 mL (45 Units total) under the skin 2 (two) times a day before breakfast and lunch   insulin regular (HumuLIN R U-500) 500 units/mL CONCENTRATED injection 2018 at Unknown time  No Yes   Sig: Inject 0 05 mL (25 Units total) under the skin daily before dinner   metoprolol tartrate (LOPRESSOR) 25 mg tablet 2018 at 0730  Yes Yes   Sig: Take 1 tablet by mouth 2 (two) times a day   oxyCODONE-acetaminophen (PERCOCET) 7 5-325 MG per tablet 2018 at 0730  Yes Yes   Sig: Take by mouth every 4 (four) hours   pantoprazole (PROTONIX) 40 mg tablet 2018 at 0730  No Yes   Sig: Take 1 tablet by mouth 2 (two) times a day before meals   potassium chloride (K-DUR,KLOR-CON) 20 mEq tablet 2018 at Unknown time No Yes   Sig: Take 1 tablet (20 mEq total) by mouth 2 (two) times a day   simvastatin (ZOCOR) 10 mg tablet 4/27/2018 at 0730  Yes Yes   Sig: Take 1 tablet by mouth daily   simvastatin (ZOCOR) 40 mg tablet 4/27/2018 at 0730  Yes Yes   Sig: Take 40 mg by mouth daily   tiotropium (SPIRIVA) 18 mcg inhalation capsule 4/27/2018 at 0730  Yes Yes   Sig: Place 18 mcg into inhaler and inhale daily      Facility-Administered Medications: None       Past Medical History:   Diagnosis Date    Abdominal pain     Cardiac disease     [facility])     COPD (chronic obstructive pulmonary disease) ([place] [address] )     Coronary artery disease     Diabetes mellitus ([place] [address] )     Hyperlipidemia     Hypertension     MI (myocardial infarction) ([place] [address] )     with 3 stents    Prostate cancer ([place] [address] )        Past Surgical History:   Procedure Laterality Date    ABDOMINAL SURGERY      exploratory    ANGIOPLASTY      3 stents    ESOPHAGOGASTRODUODENOSCOPY N/A 10/2/2017    Procedure: ESOPHAGOGASTRODUODENOSCOPY (EGD); Surgeon: Elder Mcardle, MD;  Location: [facility]; Service: Gastroenterology    PROSTATE SURGERY         Family History   Problem Relation Age of Onset    Heart disease Father      I have reviewed and agree with the history as documented  Social History   Substance Use Topics    Smoking status: Former Smoker     Packs/day: 1 50     Years: 50 00     Quit date: 9/13/2017    Smokeless tobacco: Never Used    Alcohol use No        Review of Systems   Constitutional: Negative for chills and fever  HENT: Negative for rhinorrhea and sore throat  Eyes: Negative for photophobia and visual disturbance  Respiratory: Positive for cough and shortness of breath  Cardiovascular: Positive for chest pain and leg swelling  Gastrointestinal: Negative for abdominal pain, diarrhea, nausea and vomiting  Genitourinary: Negative for dysuria, frequency and hematuria  Musculoskeletal: Negative for back pain and neck pain  Skin: Positive for rash  Negative for wound  Neurological: Negative for light-headedness and headaches  Physical Exam  ED Triage Vitals   Temperature Pulse Respirations Blood Pressure SpO2   04/27/18 1435 04/27/18 1435 04/27/18 1435 04/27/18 1435 04/27/18 1435   98 4 °F (36 9 °C) 103 (!) 30 140/80 94 %      Temp Source Heart Rate Source Patient Position - Orthostatic VS BP Location FiO2 (%)   04/27/18 2057 04/27/18 2057 04/27/18 2330 04/27/18 2057 --   Oral Monitor Lying Right arm       Pain Score       04/27/18 1435       Worst Possible Pain           Orthostatic Vital Signs  Vitals:    04/27/18 2057 04/27/18 2330 04/28/18 0703 04/28/18 1205   BP: 145/72 146/65 153/69 152/64   Pulse: (!) 111 99 82 80   Patient Position - Orthostatic VS:  Lying Lying Lying       Physical Exam   Constitutional: He is oriented to person, place, and time  He appears well-developed and well-nourished  No distress  HENT:   Head: Normocephalic and atraumatic  Eyes: Conjunctivae are normal  Pupils are equal, round, and reactive to light  No scleral icterus  Neck: Neck supple  No tracheal deviation present  Cardiovascular: Normal rate, regular rhythm and normal heart sounds  Exam reveals no gallop and no friction rub  No murmur heard  Pulmonary/Chest: He has no wheezes  He has rales (Bibasilar)  Mild to moderate increased work of breathing, decreased breath sounds throughout   Abdominal: Soft  He exhibits no distension  There is no tenderness  There is no rebound and no guarding  Musculoskeletal: He exhibits edema ( 2+ symmetric pitting edema bilateral lower extremities with chronic stasis dermatitis)  He exhibits no tenderness  Neurological: He is alert and oriented to person, place, and time  Skin: Skin is warm and dry  He is not diaphoretic  Psychiatric: He has a normal mood and affect  His behavior is normal  Thought content normal    Vitals reviewed        ED Medications  Medications   cyclobenzaprine (FLEXERIL) tablet 10 mg (10 mg Oral Given 4/27/18 2242)   pantoprazole (PROTONIX) EC tablet 40 mg (40 mg Oral Given 4/28/18 0639)   furosemide (LASIX) tablet 40 mg (40 mg Oral Given 4/28/18 0838)   ferrous sulfate tablet 325 mg (325 mg Oral Given 4/28/18 0838)   budesonide-formoterol (SYMBICORT) 160-4 5 mcg/act inhaler 2 puff (2 puffs Inhalation Given 4/28/18 0839)   fluticasone (FLONASE) 50 mcg/act nasal spray 1 spray (1 spray Nasal Not Given 4/28/18 0843)   potassium chloride (K-DUR,KLOR-CON) CR tablet 20 mEq (20 mEq Oral Given 4/28/18 0838)   aspirin (ECOTRIN LOW STRENGTH) EC tablet 81 mg (81 mg Oral Given 4/28/18 0838)   budesonide (PULMICORT) inhalation solution 0 5 mg (0 5 mg Nebulization Given 4/28/18 0733)   fish oil capsule 1,000 mg (1,000 mg Oral Given 4/28/18 0838)   pravastatin (PRAVACHOL) tablet 20 mg (not administered)   metoprolol tartrate (LOPRESSOR) tablet 25 mg (25 mg Oral Given 4/28/18 0838)   oxyCODONE-acetaminophen (PERCOCET) 5-325 mg per tablet 1 tablet (1 tablet Oral Given 4/28/18 0913)   ampicillin-sulbactam (UNASYN) 3 g in sodium chloride 0 9 % 100 mL IVPB (3 g Intravenous New Bag 4/28/18 1336)   insulin lispro (HumaLOG) 100 units/mL subcutaneous injection 2-12 Units (10 Units Subcutaneous Given 4/28/18 1232)   insulin lispro (HumaLOG) 100 units/mL subcutaneous injection 1-6 Units (1 Units Subcutaneous Not Given 4/27/18 7387)   levalbuterol (XOPENEX) inhalation solution 1 25 mg (1 25 mg Nebulization Given 4/28/18 1352)   sodium chloride 0 9 % inhalation solution 3 mL (not administered)   sodium chloride 0 9 % inhalation solution 3 mL (3 mL Nebulization Given 4/28/18 1353)   levalbuterol (XOPENEX) inhalation solution 1 25 mg (not administered)   insulin regular (HumuLIN R U-500) 500 units/mL CONCENTRATED injection 30 Units (not administered)   insulin regular (HumuLIN R U-500) 500 units/mL CONCENTRATED injection 50 Units (50 Units Subcutaneous Given 4/28/18 1233)   methylPREDNISolone sodium succinate (Solu-MEDROL) injection 40 mg (not administered)   enoxaparin (LOVENOX) subcutaneous injection 40 mg (not administered)   albuterol inhalation solution 10 mg (10 mg Nebulization Given 4/27/18 1458)   ipratropium (ATROVENT) 0 02 % inhalation solution 1 mg (1 mg Nebulization Given 4/27/18 1458)   sodium chloride 0 9 % inhalation solution 3 mL (3 mL Nebulization Given 4/27/18 1458)   methylPREDNISolone sodium succinate (Solu-MEDROL) injection 125 mg (125 mg Intravenous Given 4/27/18 1543)   azithromycin (ZITHROMAX) 500 mg in sodium chloride 0 9% 250mL IVPB 500 mg (0 mg Intravenous Stopped 4/27/18 1643)   albuterol inhalation solution 5 mg (5 mg Nebulization Given 4/27/18 1806)   levalbuterol (XOPENEX) 1 25 mg/0 5 mL inhalation solution **AcuDose Override Pull** (1 25 mg  Given 4/27/18 2306)   insulin regular (HumuLIN R,NovoLIN R) injection 8 Units (8 Units Subcutaneous Given 4/27/18 2348)   insulin lispro (HumaLOG) 100 units/mL subcutaneous injection 6 Units (6 Units Subcutaneous Given 4/28/18 0246)       Diagnostic Studies  Results Reviewed     Procedure Component Value Units Date/Time    CBC and differential [[id number]]  (Abnormal) Collected:  04/27/18 1522    Lab Status:  Final result Specimen:  Blood from Arm, Right Updated:  04/27/18 1611     WBC 13 52 (H) Thousand/uL      RBC 5 18 Million/uL      Hemoglobin 13 9 g/dL      Hematocrit 45 6 %      MCV 88 fL      MCH 26 8 pg      MCHC 30 5 (L) g/dL      RDW 16 1 (H) %      MPV 9 8 fL      Platelets 207 Thousands/uL      nRBC 0 /100 WBCs      Neutrophils Relative 83 (H) %      Lymphocytes Relative 11 (L) %      Monocytes Relative 5 %      Eosinophils Relative 1 %      Basophils Relative 0 %      Neutrophils Absolute 11 28 (H) Thousands/µL      Lymphocytes Absolute 1 42 Thousands/µL      Monocytes Absolute 0 61 Thousand/µL      Eosinophils Absolute 0 11 Thousand/µL      Basophils Absolute 0 01 Thousands/µL     Troponin I [[id number]]  (Normal) Collected: 04/27/18 1522    Lab Status:  Final result Specimen:  Blood from Arm, Right Updated:  04/27/18 1557     Troponin I <0 02 ng/mL     Narrative:         [employer] Chemistry analyzer 99% cutoff is > 0 04 ng/mL in network labs    o cTnI 99% cutoff is useful only when applied to patients in the clinical setting of myocardial ischemia  o cTnI 99% cutoff should be interpreted in the context of clinical history, ECG findings and possibly cardiac imaging to establish correct diagnosis  o cTnI 99% cutoff may be suggestive but clearly not indicative of a coronary event without the clinical setting of myocardial ischemia  BNP [[id number]]  (Abnormal) Collected:  04/27/18 1522    Lab Status:  Final result Specimen:  Blood from Arm, Right Updated:  04/27/18 1556     NT-proBNP 523 (H) pg/mL     Basic metabolic panel [[id number]]  (Abnormal) Collected:  04/27/18 1522    Lab Status:  Final result Specimen:  Blood from Arm, Right Updated:  04/27/18 1553     Sodium 137 mmol/L      Potassium 4 6 mmol/L      Chloride 98 (L) mmol/L      CO2 34 (H) mmol/L      Anion Gap 5 mmol/L      BUN 16 mg/dL      Creatinine 1 48 (H) mg/dL      Glucose 325 (H) mg/dL      Calcium 8 3 mg/dL      eGFR 47 ml/min/1 73sq m     Narrative:         National Kidney Disease Education Program recommendations are as follows:  GFR calculation is accurate only with a steady state creatinine  Chronic Kidney disease less than 60 ml/min/1 73 sq  meters  Kidney failure less than 15 ml/min/1 73 sq  meters      Blood gas, venous [[id number]]  (Abnormal) Collected:  04/27/18 1522    Lab Status:  Final result Specimen:  Blood from Arm, Right Updated:  04/27/18 1538     pH, Dwain 7 328     pCO2, Dwain 71 8 (H) mm Hg      pO2, Dwain 76 4 (H) mm Hg      HCO3, Dwain 36 8 (H) mmol/L      Base Excess, Dwain 7 9 mmol/L      O2 Content, Dwain 18 5 ml/dL      O2 HGB, VENOUS 89 4 (H) %                  X-ray chest 2 views   Final Result by Daniela Oliva MD (04/27 1611)      Stable cardiomegaly and right pleural effusion            Workstation performed: [id number]               Procedures  Procedures      Phone Consults  ED Phone Contact    ED Course  ED Course as of Apr 28 1528   Fri Apr 27, 2018   1706 Procedure Note: EKG  Date/Time: 04/27/18 5:06 PM   Indications / Diagnosis: Palpitations  ECG reviewed by me, the ED Provider: yes   The EKG demonstrates:  Rhythm: sinus tachycardia  Intervals: normal intervals  Axis: normal axis  QRS/Blocks: duration 122 ms, incomplete RBBB  ST Changes: No acute ST Changes, no STD/VINI  Unifocal PVC            HEART Risk Score      Most Recent Value   History  0 Filed at: 04/27/2018 1704   ECG  0 Filed at: 04/27/2018 1704   Age  2 Filed at: 04/27/2018 1704   Risk Factors  2 Filed at: 04/27/2018 1704   Troponin  0 Filed at: 04/27/2018 1704   Heart Score Risk Calculator   History  0 Filed at: 04/27/2018 1704   ECG  0 Filed at: 04/27/2018 1704   Age  2 Filed at: 04/27/2018 1704   Risk Factors  2 Filed at: 04/27/2018 1704   Troponin  0 Filed at: 04/27/2018 1704   HEART Score  4 Filed at: 04/27/2018 1704   HEART Score  4 Filed at: 04/27/2018 1704        Identification of Seniors at Risk      Most Recent Value   (ISAR) Identification of Seniors at Risk   Before the illness or injury that brought you to the Emergency, did you need someone to help you on a regular basis? 0 Filed at: 04/27/2018 1436   In the last 24 hours, have you needed more help than usual?  0 Filed at: 04/27/2018 1436   Have you been hospitalized for one or more nights during the past 6 months? 1 Filed at: 04/27/2018 1436   In general, do you see well?  0 Filed at: 04/27/2018 1436   In general, do you have serious problems with your memory? 0 Filed at: 04/27/2018 1436   Do you take more than three different medications every day?   1 Filed at: 04/27/2018 1436   ISAR Score  2 Filed at: 04/27/2018 1436                          MDM  Number of Diagnoses or Management Options  COPD exacerbation ([facility] [address] ): Diagnosis management comments: Clinical presentation and workup consistent with COPD exacerbation  BNP less than 500 and no increased pulmonary vascular congestion on chest x-ray  No discrete pneumonia  Patient was given Solu-Medrol, Heart neb, and an additional 5 mg albuterol nebulizer with some symptomatic improvement  Significantly improved work of breathing after this therapy  VBG showed CO2 greater than 70 with normal pH likely chronically retaining  Patient was given azithromycin for change in sputum production in the setting of COPD  He was admitted to [facility] for further management  CritCare Time    Disposition  Final diagnoses:   COPD exacerbation ([facility] )     Time reflects when diagnosis was documented in both MDM as applicable and the Disposition within this note     Time User Action Codes Description Comment    4/27/2018  5:05 PM Maryana Ramey Add [J44 1] COPD exacerbation ([facility] )     4/27/2018  6:10 PM Minor Amber Add [H39 178] Cellulitis of left lower extremity       ED Disposition     ED Disposition Condition Comment    Admit  Case was discussed with Dr Uriah Lara and the patient's admission status was agreed to be Admission Status: inpatient status to the service of Dr Uriah Lara          Follow-up Information    None       Current Discharge Medication List      CONTINUE these medications which have NOT CHANGED    Details   albuterol (2 5 mg/3 mL) 0 083 % nebulizer solution Inhale 1 each      aspirin (ECOTRIN LOW STRENGTH) 81 mg EC tablet Take 1 tablet by mouth daily      budesonide (PULMICORT) 0 5 mg/2 mL nebulizer solution Inhale 1 each 2 (two) times a day      budesonide-formoterol (SYMBICORT) 160-4 5 mcg/act inhaler Inhale 2 puffs 2 (two) times a day      cyclobenzaprine (FLEXERIL) 10 mg tablet Take 1 tablet by mouth daily at bedtime  Qty: 30 tablet, Refills: 0      ferrous sulfate 325 (65 Fe) mg tablet Take 325 mg by mouth daily with breakfast      fluticasone (FLONASE) 50 mcg/act nasal spray 1 spray into each nostril daily  Qty: 16 g, Refills: 0    Associated Diagnoses: Chronic respiratory failure with hypoxia and hypercapnia (HCC)      furosemide (LASIX) 40 mg tablet Take 1 tablet by mouth daily  Qty: 30 tablet, Refills: 0      glucose blood (ONE TOUCH ULTRA TEST) test strip by In Vitro route      !! insulin regular (HumuLIN R U-500) 500 units/mL CONCENTRATED injection Inject 0 09 mL (45 Units total) under the skin 2 (two) times a day before breakfast and lunch  Qty: 10 mL, Refills: 0    Associated Diagnoses: Chronic respiratory failure with hypoxia and hypercapnia ([facility] [address] ); Type 2 diabetes mellitus with complication, with long-term current use of insulin ([facility] [address] )      ! ! insulin regular (HumuLIN R U-500) 500 units/mL CONCENTRATED injection Inject 0 05 mL (25 Units total) under the skin daily before dinner  Qty: 10 mL, Refills: 0    Associated Diagnoses: Chronic respiratory failure with hypoxia and hypercapnia ([facility] [address] ); Type 2 diabetes mellitus with complication, with long-term current use of insulin ([facility])      Insulin Syringe/Needle U-500 (BD INSULIN SYRINGE U-500) 31G X 6MM 0 5 ML MISC by Does not apply route      Iron, Ferrous Sulfate, 142 (45 Fe) MG TBCR Take by mouth      metoprolol tartrate (LOPRESSOR) 25 mg tablet Take 1 tablet by mouth 2 (two) times a day      Omega-3 Fatty Acids (FISH OIL) 645 MG CAPS Take 1 capsule by mouth 2 (two) times a day      oxyCODONE-acetaminophen (PERCOCET) 7 5-325 MG per tablet Take by mouth every 4 (four) hours      pantoprazole (PROTONIX) 40 mg tablet Take 1 tablet by mouth 2 (two) times a day before meals  Qty: 60 tablet, Refills: 0      potassium chloride (K-DUR,KLOR-CON) 20 mEq tablet Take 1 tablet (20 mEq total) by mouth 2 (two) times a day  Qty: 60 tablet, Refills: 0    Associated Diagnoses: Chronic respiratory failure with hypoxia and hypercapnia (HCC)      ! ! simvastatin (ZOCOR) 10 mg tablet Take 1 tablet by mouth daily      !! simvastatin (ZOCOR) 40 mg tablet Take 40 mg by mouth daily  Refills: 5      tiotropium (SPIRIVA) 18 mcg inhalation capsule Place 18 mcg into inhaler and inhale daily      Tiotropium Bromide-Olodaterol (STIOLTO RESPIMAT) 2 5-2 5 MCG/ACT AERS Inhale       !! - Potential duplicate medications found  Please discuss with provider  No discharge procedures on file  ED Provider  Attending physically available and evaluated Murtaza Raymundo I managed the patient along with the ED Attending      Electronically Signed by         Brenna Gilliam MD  04/28/18 7135

## 2018-04-27 NOTE — ED ATTENDING ATTESTATION
Khai Ramos MD, saw and evaluated the patient  I have discussed the patient with the resident/non-physician practitioner and agree with the resident's/non-physician practitioner's findings, Plan of Care, and MDM as documented in the resident's/non-physician practitioner's note, except where noted  All available labs and Radiology studies were reviewed  At this point I agree with the current assessment done in the Emergency Department    I have conducted an independent evaluation of this patient a history and physical is as follows:  Pt on home O2 with chest pain and dyspnea since last pm Pt has constant chest pressure Pt has ahd increased cough and PND Pt also has noted increasing swelling of legs Pt has copd and chf PE: alert heart reg lungs increased work of breathing few rales at bases poor air movement Ext bilat edema MDM: will treat likely copd    Critical Care Time  CritCare Time    Procedures

## 2018-04-27 NOTE — ASSESSMENT & PLAN NOTE
Redness, edema, oozing from multiple that wounds midtibial area  Podiatry consult  Started unasyn  Wound care consult  Monitor clinically    Dry dressings for now

## 2018-04-27 NOTE — H&P
H&P- Gilmer Esposito Sr  1945, 81 y o  male MRN: [id number]    Unit/Bed#: ED 02 Encounter: [id number]    Primary Care Provider: Leatha Kirk MD   Date and time admitted to hospital: 4/27/2018  2:37 PM        Acute on chronic respiratory failure with hypoxia and hypercapnia [facility])   Assessment & Plan    Bilateral wheezing, active respiratory distress on clinical exam  Chest x-ray with stable cardiomegaly and right pleural effusion for 04/27/2018  Blood gas on exam hypoxia with CO2 retention  Leukocytosis 13 52  ProBNP 523  Troponin less than 0 02  EKG  Sinus tachycardia with occasional Premature ventricular complexes  Right bundle branch block, Possible Lateral infarct , age undetermined, Possible Inferior infarct , age undetermined  Confirmed by [facility] MARIE SIM ([id number]) on 4/27/2018 3:36:28 PM  Bronchodilator therapy as scheduled  Continue oxygen supplementation, at baseline 3 L nasal cannula, BiPAP 20/12 60% if clinical condition deteriorates  Continue Solu-Medrol 60 q 6h  Pulmonology consult  Peak flow daily  Continue antibiotics for now        Diabetic neuropathy (HCC)   Assessment & Plan    Chronic  Continue above management        Essential hypertension   Assessment & Plan    Continue blood pressure monitoring  BP elevated on admission  Continue meds          Chronic kidney disease, stage 3   Assessment & Plan    Creatinine 1 48, similar compared to baseline  Monitor BMP for BUN creatinine daily        COPD (chronic obstructive pulmonary disease) ([facility] [address] )   Assessment & Plan    Continue above management for acute on chronic respiratory failure        Venous stasis dermatitis of both lower extremities   Assessment & Plan    Redness, edema, oozing from multiple that wounds midtibial area of left leg  Podiatry consult  Started unasyn, for acute cellulitis  Wound care consult  Monitor clinically    Dry dressings for now        Type 2 diabetes mellitus with complication [facility])   Assessment & Plan    Continue home meds  Continue blood glucose monitoring  A1c 6 8% 6 months ago  Repeat A1c today  Carb controlled diet  Counseling provided on diet exercise and weight loss          Dyslipidemia   Assessment & Plan    Continue statin        Obesity (BMI 30-39  9)   Assessment & Plan    Counseling provided on diet exercise and weight loss  Continue above management              VTE Prophylaxis: Warfarin (Coumadin)  / sequential compression device   Code Status: full code  POLST: POLST form is not discussed and not completed at this time  Discussion with family: no    Anticipated Length of Stay:  Patient will be admitted on an Inpatient basis with an anticipated length of stay of >2 midnights  Justification for Hospital Stay:  Medical management of COPD exacerbation    Total Time for Visit, including Counseling / Coordination of Care: 45 minutes  Greater than 50% of this total time spent on direct patient counseling and coordination of care  Chief Complaint:   Shortness of breath  History of Present Illness:    Marce Calzada  is a 81 y o  male  with past medical history of grade 1 diastolic dysfunction CHF,  COPD, history of pleural effusion, thoracocentesis on prior admission to 02/2018,  chronic hypoxic respiratory failure on nasal cannula, allergic to Neurontin, diabetes mellitus, chronic venous stasis dermatitis of bilateral lower extremities, dyslipidemia, morbid obesity, CKD stage 3 was brought to the ER with complaints of shortness of breath which started last night, worsening  nonproductive cough (feels like phelgm is in his throat),  denied any phlegm expectorated, fever, chills, nausea, vomiting, change in bowel urinary habits, recent ill contacts  He reported bilateral lower extremity edema with lesions present on left lower extremity with bruising and erythema  Review of Systems:    Review of Systems   Constitutional: Positive for activity change   Negative for appetite change, chills, diaphoresis, fatigue and fever  HENT: Negative for facial swelling, postnasal drip, sore throat and trouble swallowing  Eyes: Negative for photophobia and visual disturbance  Respiratory: Positive for cough, shortness of breath and wheezing  Negative for apnea, choking, chest tightness and stridor  Cardiovascular: Negative for chest pain, palpitations and leg swelling  Gastrointestinal: Negative  Negative for constipation, diarrhea, nausea and vomiting  Endocrine: Negative  Genitourinary: Negative  Negative for dysuria, frequency and urgency  Musculoskeletal: Negative  Skin: Positive for color change, rash and wound  Negative for pallor  Allergic/Immunologic: Negative  Neurological: Negative for dizziness, tremors, seizures, syncope, weakness, light-headedness, numbness and headaches  Hematological: Negative  Psychiatric/Behavioral: Negative  Past Medical and Surgical History:     Past Medical History:   Diagnosis Date    Abdominal pain     Cardiac disease     COPD (chronic obstructive pulmonary disease) ([place] [address] )     Coronary artery disease     Diabetes mellitus ([place] [address] )     Hyperlipidemia     Hypertension     MI (myocardial infarction) ([place] [address] )     with 3 stents    Prostate cancer ([place] [address] )        Past Surgical History:   Procedure Laterality Date    ABDOMINAL SURGERY      exploratory    ANGIOPLASTY      3 stents    ESOPHAGOGASTRODUODENOSCOPY N/A 10/2/2017    Procedure: ESOPHAGOGASTRODUODENOSCOPY (EGD); Surgeon: Alex Malloy MD;  Location: [facility]; Service: Gastroenterology    PROSTATE SURGERY         Meds/Allergies:    Prior to Admission medications    Medication Sig Start Date End Date Taking?  Authorizing Provider   albuterol (2 5 mg/3 mL) 0 083 % nebulizer solution Inhale 1 each 4/8/14   Historical Provider, MD   aspirin (ECOTRIN LOW STRENGTH) 81 mg EC tablet Take 1 tablet by mouth daily 12/6/16   Historical Provider, MD   budesonide (PULMICORT) 0 5 mg/2 mL nebulizer solution Inhale 1 each 2 (two) times a day 4/29/16   Historical Provider, MD   budesonide-formoterol (SYMBICORT) 160-4 5 mcg/act inhaler Inhale 2 puffs 2 (two) times a day    Historical Provider, MD   cyclobenzaprine (FLEXERIL) 10 mg tablet Take 1 tablet by mouth daily at bedtime  Patient taking differently: Take 10 mg by mouth 2 (two) times a day   10/5/17   Jasmin Hood MD   ferrous sulfate 325 (65 Fe) mg tablet Take 325 mg by mouth daily with breakfast    Historical Provider, MD   fluticasone (FLONASE) 50 mcg/act nasal spray 1 spray into each nostril daily 2/13/18   Aleida Fraser MD   furosemide (LASIX) 40 mg tablet Take 1 tablet by mouth daily 10/5/17   Jasmin Hood MD   glucose blood (ONE TOUCH ULTRA TEST) test strip by In Vitro route 1/19/17   Historical Provider, MD   insulin regular (HumuLIN R U-500) 500 units/mL CONCENTRATED injection Inject 0 09 mL (45 Units total) under the skin 2 (two) times a day before breakfast and lunch 2/12/18   Aleida Fraser MD   insulin regular (HumuLIN R U-500) 500 units/mL CONCENTRATED injection Inject 0 05 mL (25 Units total) under the skin daily before dinner 2/12/18   Aleida Fraser MD   Insulin Syringe/Needle U-500 (BD INSULIN SYRINGE U-500) 31G X 6MM 0 5 ML MISC by Does not apply route 8/15/17   Historical Provider, MD   Iron, Ferrous Sulfate, 142 (45 Fe) MG TBCR Take by mouth    Historical Provider, MD   metoprolol tartrate (LOPRESSOR) 25 mg tablet Take 1 tablet by mouth 2 (two) times a day    Historical Provider, MD   Omega-3 Fatty Acids (FISH OIL) 645 MG CAPS Take 1 capsule by mouth 2 (two) times a day    Historical Provider, MD   oxyCODONE-acetaminophen (PERCOCET) 7 5-325 MG per tablet Take by mouth every 4 (four) hours 3/21/17   Azam Provider, MD   pantoprazole (PROTONIX) 40 mg tablet Take 1 tablet by mouth 2 (two) times a day before meals 10/5/17   Jasmin Hood MD   potassium chloride (K-DUR,KLOR-CON) 20 mEq tablet Take 1 tablet (20 mEq total) by mouth 2 (two) times a day 2/12/18   Juan Hager MD   simvastatin (ZOCOR) 10 mg tablet Take 1 tablet by mouth daily 4/29/16   Historical Provider, MD   simvastatin (ZOCOR) 40 mg tablet Take 40 mg by mouth daily 3/15/18   Historical Provider, MD   tiotropium (SPIRIVA) 18 mcg inhalation capsule Place 18 mcg into inhaler and inhale daily    Historical Provider, MD   Tiotropium Bromide-Olodaterol (STIOLTO RESPIMAT) 2 5-2 5 MCG/ACT AERS Inhale 3/21/17   Historical Provider, MD     I have reviewed home medications with patient personally  Allergies: Allergies   Allergen Reactions    Metformin GI Intolerance       Social History:     Marital Status: /Civil Union   Occupation:  Unemployed  Patient Pre-hospital Living Situation:  Lives at home with his wife  Patient Pre-hospital Level of Mobility:  Ambulates  Patient Pre-hospital Diet Restrictions:  None  Substance Use History:   History   Alcohol Use No     History   Smoking Status    Former Smoker    Packs/day: 1 50    Years: 50 00    Quit date: 9/13/2017   Smokeless Tobacco    Never Used     History   Drug Use No       Family History:    Family History   Problem Relation Age of Onset    Heart disease Father        Physical Exam:     Vitals:   Blood Pressure: 161/76 (04/27/18 1709)  Pulse: 100 (04/27/18 1709)  Temperature: 98 4 °F (36 9 °C) (04/27/18 1435)  Respirations: (!) 24 (04/27/18 1709)  Weight - Scale: 118 kg (260 lb) (04/27/18 1435)  SpO2: 94 % (04/27/18 1709)    Physical Exam   Constitutional: He is oriented to person, place, and time  He appears distressed  Morbidly obese man on 3 L nasal cannula   HENT:   Head: Normocephalic and atraumatic  Mouth/Throat: Oropharynx is clear and moist  No oropharyngeal exudate  Eyes: Conjunctivae and EOM are normal  Pupils are equal, round, and reactive to light  No scleral icterus  Neck: Normal range of motion  No JVD present  Cardiovascular: Normal rate  Exam reveals no gallop and no friction rub      Murmur heard   Pulmonary/Chest: He is in respiratory distress  He has wheezes  He has no rales  He exhibits no tenderness  Abdominal: Soft  Bowel sounds are normal  He exhibits distension  He exhibits no mass  There is no tenderness  There is no rebound and no guarding  Obese abdomen unable to appreciate organomegaly   Musculoskeletal: Normal range of motion  He exhibits edema and tenderness  He exhibits no deformity  Bilateral lower extremity edema, with multiple circumferential rashes anterior tibial area with redness  Lymphadenopathy:     He has no cervical adenopathy  Neurological: He is alert and oriented to person, place, and time  No cranial nerve deficit  Coordination normal    Skin: Skin is warm  No rash noted  No erythema  No pallor  Psychiatric: He has a normal mood and affect  His behavior is normal  Judgment and thought content normal            Additional Data:     Lab Results: I have personally reviewed pertinent reports  Results from last 7 days  Lab Units 04/27/18  1522   WBC Thousand/uL 13 52*   HEMOGLOBIN g/dL 13 9   HEMATOCRIT % 45 6   PLATELETS Thousands/uL 177   NEUTROS PCT % 83*   LYMPHS PCT % 11*   MONOS PCT % 5   EOS PCT % 1       Results from last 7 days  Lab Units 04/27/18  1522   SODIUM mmol/L 137   POTASSIUM mmol/L 4 6   CHLORIDE mmol/L 98*   CO2 mmol/L 34*   BUN mg/dL 16   CREATININE mg/dL 1 48*   CALCIUM mg/dL 8 3   GLUCOSE RANDOM mg/dL 325*           Imaging: I have personally reviewed pertinent reports        X-ray chest 2 views   Final Result by Torie Barber MD (04/27 1611)      Stable cardiomegaly and right pleural effusion            Workstation performed: [id number]             EKG, Pathology, and Other Studies Reviewed on Admission:   EKG: Sinus tachycardia with occasional Premature ventricular complexes  Right bundle branch block  Possible Lateral infarct , age undetermined  Possible Inferior infarct , age undetermined  Confirmed by [facility] MARIE SIM ([id number]) on 4/27/2018 3:36:28 PM  Allscripts / Epic Records Reviewed: Yes     ** Please Note: This note has been constructed using a voice recognition system   **

## 2018-04-27 NOTE — ASSESSMENT & PLAN NOTE
Continue home meds  Continue blood glucose monitoring  A1c 6 8% 6 months ago  Repeat A1c today  Carb controlled diet  Counseling provided on diet exercise and weight loss

## 2018-04-28 LAB
ANION GAP SERPL CALCULATED.3IONS-SCNC: 6 MMOL/L (ref 4–13)
BASOPHILS # BLD MANUAL: 0 THOUSAND/UL (ref 0–0.1)
BASOPHILS NFR MAR MANUAL: 0 % (ref 0–1)
BUN SERPL-MCNC: 22 MG/DL (ref 5–25)
CALCIUM SERPL-MCNC: 8.7 MG/DL (ref 8.3–10.1)
CHLORIDE SERPL-SCNC: 96 MMOL/L (ref 100–108)
CO2 SERPL-SCNC: 34 MMOL/L (ref 21–32)
CREAT SERPL-MCNC: 1.26 MG/DL (ref 0.6–1.3)
EOSINOPHIL # BLD MANUAL: 0 THOUSAND/UL (ref 0–0.4)
EOSINOPHIL NFR BLD MANUAL: 0 % (ref 0–6)
ERYTHROCYTE [DISTWIDTH] IN BLOOD BY AUTOMATED COUNT: 16.1 % (ref 11.6–15.1)
GFR SERPL CREATININE-BSD FRML MDRD: 57 ML/MIN/1.73SQ M
GLUCOSE SERPL-MCNC: 286 MG/DL (ref 65–140)
GLUCOSE SERPL-MCNC: 347 MG/DL (ref 65–140)
GLUCOSE SERPL-MCNC: 349 MG/DL (ref 65–140)
GLUCOSE SERPL-MCNC: 354 MG/DL (ref 65–140)
GLUCOSE SERPL-MCNC: 359 MG/DL (ref 65–140)
GLUCOSE SERPL-MCNC: 388 MG/DL (ref 65–140)
HCT VFR BLD AUTO: 40.5 % (ref 36.5–49.3)
HGB BLD-MCNC: 13.1 G/DL (ref 12–17)
INR PPP: 1.05 (ref 0.86–1.16)
LYMPHOCYTES # BLD AUTO: 0.31 THOUSAND/UL (ref 0.6–4.47)
LYMPHOCYTES # BLD AUTO: 3 % (ref 14–44)
MCH RBC QN AUTO: 27.8 PG (ref 26.8–34.3)
MCHC RBC AUTO-ENTMCNC: 32.3 G/DL (ref 31.4–37.4)
MCV RBC AUTO: 86 FL (ref 82–98)
MONOCYTES # BLD AUTO: 0.2 THOUSAND/UL (ref 0–1.22)
MONOCYTES NFR BLD: 2 % (ref 4–12)
NEUTROPHILS # BLD MANUAL: 9.68 THOUSAND/UL (ref 1.85–7.62)
NEUTS SEG NFR BLD AUTO: 95 % (ref 43–75)
NRBC BLD AUTO-RTO: 0 /100 WBCS
OVALOCYTES BLD QL SMEAR: PRESENT
PLATELET # BLD AUTO: 166 THOUSANDS/UL (ref 149–390)
PLATELET BLD QL SMEAR: ADEQUATE
PMV BLD AUTO: 10.6 FL (ref 8.9–12.7)
POIKILOCYTOSIS BLD QL SMEAR: PRESENT
POTASSIUM SERPL-SCNC: 5 MMOL/L (ref 3.5–5.3)
PROTHROMBIN TIME: 13.7 SECONDS (ref 12.1–14.4)
RBC # BLD AUTO: 4.72 MILLION/UL (ref 3.88–5.62)
RBC MORPH BLD: PRESENT
SODIUM SERPL-SCNC: 136 MMOL/L (ref 136–145)
WBC # BLD AUTO: 10.19 THOUSAND/UL (ref 4.31–10.16)

## 2018-04-28 PROCEDURE — 85027 COMPLETE CBC AUTOMATED: CPT | Performed by: INTERNAL MEDICINE

## 2018-04-28 PROCEDURE — 94640 AIRWAY INHALATION TREATMENT: CPT

## 2018-04-28 PROCEDURE — 80048 BASIC METABOLIC PNL TOTAL CA: CPT | Performed by: INTERNAL MEDICINE

## 2018-04-28 PROCEDURE — 82948 REAGENT STRIP/BLOOD GLUCOSE: CPT

## 2018-04-28 PROCEDURE — 99232 SBSQ HOSP IP/OBS MODERATE 35: CPT | Performed by: INTERNAL MEDICINE

## 2018-04-28 PROCEDURE — 85610 PROTHROMBIN TIME: CPT | Performed by: INTERNAL MEDICINE

## 2018-04-28 PROCEDURE — 85007 BL SMEAR W/DIFF WBC COUNT: CPT | Performed by: INTERNAL MEDICINE

## 2018-04-28 PROCEDURE — 94760 N-INVAS EAR/PLS OXIMETRY 1: CPT

## 2018-04-28 PROCEDURE — 99223 1ST HOSP IP/OBS HIGH 75: CPT | Performed by: INTERNAL MEDICINE

## 2018-04-28 RX ORDER — METHYLPREDNISOLONE SODIUM SUCCINATE 40 MG/ML
40 INJECTION, POWDER, LYOPHILIZED, FOR SOLUTION INTRAMUSCULAR; INTRAVENOUS EVERY 8 HOURS SCHEDULED
Status: DISCONTINUED | OUTPATIENT
Start: 2018-04-28 | End: 2018-04-30

## 2018-04-28 RX ADMIN — FUROSEMIDE 40 MG: 40 TABLET ORAL at 08:38

## 2018-04-28 RX ADMIN — FERROUS SULFATE TAB 325 MG (65 MG ELEMENTAL FE) 325 MG: 325 (65 FE) TAB at 08:38

## 2018-04-28 RX ADMIN — METOPROLOL TARTRATE 25 MG: 25 TABLET ORAL at 18:04

## 2018-04-28 RX ADMIN — INSULIN LISPRO 10 UNITS: 100 INJECTION, SOLUTION INTRAVENOUS; SUBCUTANEOUS at 12:32

## 2018-04-28 RX ADMIN — ISODIUM CHLORIDE 3 ML: 0.03 SOLUTION RESPIRATORY (INHALATION) at 19:56

## 2018-04-28 RX ADMIN — PANTOPRAZOLE SODIUM 40 MG: 40 TABLET, DELAYED RELEASE ORAL at 06:39

## 2018-04-28 RX ADMIN — PRAVASTATIN SODIUM 20 MG: 20 TABLET ORAL at 16:14

## 2018-04-28 RX ADMIN — BUDESONIDE 0.5 MG: 0.5 INHALANT RESPIRATORY (INHALATION) at 19:56

## 2018-04-28 RX ADMIN — BUDESONIDE AND FORMOTEROL FUMARATE DIHYDRATE 2 PUFF: 160; 4.5 AEROSOL RESPIRATORY (INHALATION) at 18:04

## 2018-04-28 RX ADMIN — OXYCODONE HYDROCHLORIDE AND ACETAMINOPHEN 1 TABLET: 5; 325 TABLET ORAL at 09:13

## 2018-04-28 RX ADMIN — INSULIN LISPRO 6 UNITS: 100 INJECTION, SOLUTION INTRAVENOUS; SUBCUTANEOUS at 02:46

## 2018-04-28 RX ADMIN — ASPIRIN 81 MG: 81 TABLET, COATED ORAL at 08:38

## 2018-04-28 RX ADMIN — BUDESONIDE AND FORMOTEROL FUMARATE DIHYDRATE 2 PUFF: 160; 4.5 AEROSOL RESPIRATORY (INHALATION) at 08:39

## 2018-04-28 RX ADMIN — Medication 1000 MG: at 18:04

## 2018-04-28 RX ADMIN — Medication 1000 MG: at 08:38

## 2018-04-28 RX ADMIN — LEVALBUTEROL HYDROCHLORIDE 1.25 MG: 1.25 SOLUTION, CONCENTRATE RESPIRATORY (INHALATION) at 07:34

## 2018-04-28 RX ADMIN — LEVALBUTEROL HYDROCHLORIDE 1.25 MG: 1.25 SOLUTION, CONCENTRATE RESPIRATORY (INHALATION) at 13:52

## 2018-04-28 RX ADMIN — SODIUM CHLORIDE 3 G: 9 INJECTION, SOLUTION INTRAVENOUS at 13:36

## 2018-04-28 RX ADMIN — INSULIN LISPRO 4 UNITS: 100 INJECTION, SOLUTION INTRAVENOUS; SUBCUTANEOUS at 21:09

## 2018-04-28 RX ADMIN — LEVALBUTEROL HYDROCHLORIDE 1.25 MG: 1.25 SOLUTION, CONCENTRATE RESPIRATORY (INHALATION) at 19:56

## 2018-04-28 RX ADMIN — ENOXAPARIN SODIUM 40 MG: 40 INJECTION SUBCUTANEOUS at 16:14

## 2018-04-28 RX ADMIN — ISODIUM CHLORIDE 3 ML: 0.03 SOLUTION RESPIRATORY (INHALATION) at 13:53

## 2018-04-28 RX ADMIN — PANTOPRAZOLE SODIUM 40 MG: 40 TABLET, DELAYED RELEASE ORAL at 16:14

## 2018-04-28 RX ADMIN — INSULIN LISPRO 8 UNITS: 100 INJECTION, SOLUTION INTRAVENOUS; SUBCUTANEOUS at 08:44

## 2018-04-28 RX ADMIN — METHYLPREDNISOLONE SODIUM SUCCINATE 60 MG: 125 INJECTION, POWDER, FOR SOLUTION INTRAMUSCULAR; INTRAVENOUS at 08:38

## 2018-04-28 RX ADMIN — SODIUM CHLORIDE 3 G: 9 INJECTION, SOLUTION INTRAVENOUS at 08:52

## 2018-04-28 RX ADMIN — METOPROLOL TARTRATE 25 MG: 25 TABLET ORAL at 08:38

## 2018-04-28 RX ADMIN — METHYLPREDNISOLONE SODIUM SUCCINATE 60 MG: 125 INJECTION, POWDER, FOR SOLUTION INTRAMUSCULAR; INTRAVENOUS at 02:38

## 2018-04-28 RX ADMIN — INSULIN HUMAN 50 UNITS: 500 INJECTION, SOLUTION SUBCUTANEOUS at 12:33

## 2018-04-28 RX ADMIN — INSULIN HUMAN 45 UNITS: 500 INJECTION, SOLUTION SUBCUTANEOUS at 08:52

## 2018-04-28 RX ADMIN — OXYCODONE HYDROCHLORIDE AND ACETAMINOPHEN 1 TABLET: 5; 325 TABLET ORAL at 21:08

## 2018-04-28 RX ADMIN — POTASSIUM CHLORIDE 20 MEQ: 1500 TABLET, EXTENDED RELEASE ORAL at 08:38

## 2018-04-28 RX ADMIN — BUDESONIDE 0.5 MG: 0.5 INHALANT RESPIRATORY (INHALATION) at 07:33

## 2018-04-28 RX ADMIN — SODIUM CHLORIDE 3 G: 9 INJECTION, SOLUTION INTRAVENOUS at 20:57

## 2018-04-28 RX ADMIN — INSULIN LISPRO 8 UNITS: 100 INJECTION, SOLUTION INTRAVENOUS; SUBCUTANEOUS at 18:05

## 2018-04-28 RX ADMIN — SODIUM CHLORIDE 3 G: 9 INJECTION, SOLUTION INTRAVENOUS at 02:15

## 2018-04-28 RX ADMIN — CYCLOBENZAPRINE HYDROCHLORIDE 10 MG: 10 TABLET, FILM COATED ORAL at 21:08

## 2018-04-28 RX ADMIN — POTASSIUM CHLORIDE 20 MEQ: 1500 TABLET, EXTENDED RELEASE ORAL at 18:04

## 2018-04-28 RX ADMIN — METHYLPREDNISOLONE SODIUM SUCCINATE 40 MG: 40 INJECTION, POWDER, FOR SOLUTION INTRAMUSCULAR; INTRAVENOUS at 21:08

## 2018-04-28 RX ADMIN — INSULIN HUMAN 30 UNITS: 500 INJECTION, SOLUTION SUBCUTANEOUS at 18:05

## 2018-04-28 NOTE — CONSULTS
Consultation - Pulmonary Medicine   Hitesh Padilla Sr  81 y o  male MRN: [id number]  Unit/Bed#: [facility] 613-01 Encounter: [id number]      Assessment:  Severe COPD with an FEV1 of 29% predicted at 1 02 L  Acute on chronic diastolic heart failure  Cellulitis  Diabetes with hyperglycemia    Plan: At this point I hear no wheezing or signs of an exacerbation of his COPD although he was on IV steroids overnight  I would decrease the steroids to 40 mg q 8 hours today and likely transition to p o  in the next 24-48 hours  He is agreeable to start on water pills although he has been reluctant the past did is increased difficulty with controlling his urination  Explained to him that not taking his water pill as causing his legs to swell and put him at risk for infection especially when coupled with his uncontrolled diabetes  Will get his blood sugars under control treat him for cellulitis and transition him back to his home inhaled regimen  I reviewed the chest x-ray there is no sign of acute lower respiratory infection will discontinue Zithromax  Patient still refuses nocturnal BiPAP for his mild chronic hypercarbic respiratory failure  History of Present Illness   Physician Requesting Consult: Johny Abdi DO  Reason for Consult / Principal Problem:  COPD    HPI: Mariama Vega  is a 81y o  year old male who presents with 2 days of increasing shortness of breath  Annie Fire does not report any upper respiratory symptoms no chest cold rhinorrhea nasal congestion or purulent mucus  He does complain of some postnasal drip  He states that his legs became very swollen and red over the course of the last several weeks  He is hesitant to take water pills did increased urgency with urination  He has also been lax with regard to his insulin recently due to an ailing wife  Unfortunately his wife is dying of cancer and he has had several social stressors which have prevented him from taking care of himself    He is adamantly denied BiPAP in the past for any sleep disordered breathing  Prior to last 3 days his breathing was at baseline  He does not use his oxygen as prescribed and should be on it 24/7  Ronda Mort He has been taking his home Symbicort Spiriva and nebulizers  Inpatient consult to Pulmonology  Consult performed by: Juana Dill ordered by: Jaelyn Buchanan          Review of Systems   Constitutional: Negative  HENT: Negative  Eyes: Negative  Respiratory: Positive for shortness of breath  Cardiovascular: Positive for leg swelling  Gastrointestinal: Negative  Endocrine: Negative  Genitourinary: Negative  Allergic/Immunologic: Negative  Neurological: Negative  Hematological: Negative  Psychiatric/Behavioral: Negative  Historical Information   Past Medical History:   Diagnosis Date    Abdominal pain     Cardiac disease     [facility]congestive [facility] failure) [facility])     COPD (chronic obstructive pulmonary disease) ([facility])     Coronary artery disease     Diabetes mellitus ([place] [address] )     Hyperlipidemia     Hypertension     MI [facility] infarction) ([facility] )     with 3 stents    Prostate cancer ([place] [address] )      Past Surgical History:   Procedure Laterality Date    ABDOMINAL SURGERY      exploratory    ANGIOPLASTY      3 stents    ESOPHAGOGASTRODUODENOSCOPY N/A 10/2/2017    Procedure: ESOPHAGOGASTRODUODENOSCOPY (EGD); Surgeon: Blanquita Pereira MD;  Location[facility];   Service: Gastroenterology    PROSTATE SURGERY       Social History   History   Alcohol Use No     History   Drug Use No     History   Smoking Status    Former Smoker    Packs/day: 1 50    Years: 50 00    Quit date: 9/13/2017   Smokeless Tobacco    Never Used     Occupational History:  Retired    Family History: non-contributory    Meds/Allergies   all current active meds have been reviewed    Allergies   Allergen Reactions    Metformin GI Intolerance       Objective   Vitals: Blood pressure 152/64, pulse 80, temperature 97 8 °F (36 6 °C), temperature source Oral, resp  rate 20, height 6' (1 829 m), weight 124 kg (273 lb 5 9 oz), SpO2 95 %  ,Body mass index is 37 08 kg/m²  Intake/Output Summary (Last 24 hours) at 04/28/18 1421  Last data filed at 04/28/18 0954   Gross per 24 hour   Intake             1250 ml   Output                0 ml   Net             1250 ml     Invasive Devices     Peripheral Intravenous Line            Peripheral IV 04/27/18 Right Antecubital less than 1 day                Physical Exam   Constitutional: He is oriented to person, place, and time  He appears well-developed and well-nourished  HENT:   Head: Normocephalic  Eyes: Pupils are equal, round, and reactive to light  Neck: Neck supple  Cardiovascular: Normal rate  Pulmonary/Chest: Effort normal  No respiratory distress  He has no wheezes  He has no rales  Abdominal: Soft  Musculoskeletal: He exhibits edema  Neurological: He is alert and oriented to person, place, and time  Skin: Skin is warm and dry  Lab Results:   CBC:   Lab Results   Component Value Date    WBC 10 19 (H) 04/28/2018    HGB 13 1 04/28/2018    HCT 40 5 04/28/2018    MCV 86 04/28/2018     04/28/2018    MCH 27 8 04/28/2018    MCHC 32 3 04/28/2018    RDW 16 1 (H) 04/28/2018    MPV 10 6 04/28/2018    NRBC 0 04/28/2018   , CMP:   Lab Results   Component Value Date     04/28/2018    K 5 0 04/28/2018    CL 96 (L) 04/28/2018    CO2 34 (H) 04/28/2018    ANIONGAP 6 04/28/2018    BUN 22 04/28/2018    CREATININE 1 26 04/28/2018    GLUCOSE 388 (H) 04/28/2018    CALCIUM 8 7 04/28/2018    EGFR 57 04/28/2018     Imaging Studies: I have personally reviewed pertinent reports     and I have personally reviewed pertinent films in PACS  EKG, Pathology, and Other Studies: I have personally reviewed pertinent films in PACS      Code Status: Prior  Advance Directive and Living Will:      Power of :    POLST:      None

## 2018-04-28 NOTE — CONSULTS
Consult - Podiatry   Dre Cai  81 y o  male MRN: [id number]  Unit/Bed#: [facility] 613-01 Encounter: [id number]    Assessment/Plan     Assessment:  1  b/l venous stasis insufficiency with multiple lower extremity wounds  2  DM with neuropathy, A1c 6 8 (11/1/17)  3  Acute on chronic resp failure  4  Obesity    Plan:  - Patient seen and examined, nontoxic appearance with all vital signs stable  - noted to be sitting at the edge of bed with legs down at dependent position  - lower extremity erythema likely secondary to stasis dermatitis and less likely cellulitis  - multiple wounds/eschars noted to b/l lower legs  Pt admits to scratching them which may have exacerbated wounds  No purulence or bone/tendon exposed  Wounds appear superficial at this time  - Unasyn/azithromycin per primary team for now, however if strictly for LE recommend monitoring off abx  - applied maxorb, dry dressing and ACE for gentle compression  - advised pt to elevate legs as much as possible for edema control  - pt may WBAT  - will d/w attending    Thank you for this consultation, we will follow along w you    History of Present Illness     HPI:  Alysha Amador Sr  is a 81 y o  male who presents with bilateral lower leg wounds  Patient states that he sees a podiatrist on [address] but does not remember his name  States that has only been to him a couple times and does not follow-up with him regularly  He has had problems with his legs on off for multiple years where his wounds open up and close  Patient states he wears compression stockings at home the and normally tries to elevated his legs much when he is at home  Admits shortness of breath however denies nausea, fever, vomiting or chest pain  Consults  Review of Systems   Constitutional: Negative  HENT: Negative  Eyes: Negative  Respiratory: Negative  Cardiovascular: Negative  Gastrointestinal: Negative      Musculoskeletal:  Erythematous/edematous legs  Skin: Wounds  Neurological: Negative  Psych: negative  Historical Information   Past Medical History:   Diagnosis Date    Abdominal pain     Cardiac disease     [facility]     COPD [facility]ch[facility]) [facility]     Coronary artery disease     Diabetes mellitus ([place] [address] )     Hyperlipidemia     Hypertension     MI (myocardial infarction) ([place] [address] )     with 3 stents    Prostate cancer ([place] [address] )      Past Surgical History:   Procedure Laterality Date    ABDOMINAL SURGERY      exploratory    ANGIOPLASTY      3 stents    ESOPHAGOGASTRODUODENOSCOPY N/A 10/2/2017    Procedure: ESOPHAGOGASTRODUODENOSCOPY (EGD); Surgeon: Cristina Baca MD;  Location: [facility];   Service: Gastroenterology    PROSTATE SURGERY       Social History   History   Alcohol Use No     History   Drug Use No     History   Smoking Status    Former Smoker    Packs/day: 1 50    Years: 50 00    Quit date: 9/13/2017   Smokeless Tobacco    Never Used     Family History:   Family History   Problem Relation Age of Onset    Heart disease Father        Meds/Allergies   Prescriptions Prior to Admission   Medication    albuterol (2 5 mg/3 mL) 0 083 % nebulizer solution    aspirin (ECOTRIN LOW STRENGTH) 81 mg EC tablet    budesonide (PULMICORT) 0 5 mg/2 mL nebulizer solution    budesonide-formoterol (SYMBICORT) 160-4 5 mcg/act inhaler    cyclobenzaprine (FLEXERIL) 10 mg tablet    ferrous sulfate 325 (65 Fe) mg tablet    fluticasone (FLONASE) 50 mcg/act nasal spray    furosemide (LASIX) 40 mg tablet    glucose blood (ONE TOUCH ULTRA TEST) test strip    insulin regular (HumuLIN R U-500) 500 units/mL CONCENTRATED injection    insulin regular (HumuLIN R U-500) 500 units/mL CONCENTRATED injection    Insulin Syringe/Needle U-500 (BD INSULIN SYRINGE U-500) 31G X 6MM 0 5 ML MISC    Iron, Ferrous Sulfate, 142 (45 Fe) MG TBCR    metoprolol tartrate (LOPRESSOR) 25 mg tablet    Omega-3 Fatty Acids (FISH OIL) 645 MG CAPS    oxyCODONE-acetaminophen (PERCOCET) 7 5-325 MG per tablet    pantoprazole (PROTONIX) 40 mg tablet    potassium chloride (K-DUR,KLOR-CON) 20 mEq tablet    simvastatin (ZOCOR) 10 mg tablet    simvastatin (ZOCOR) 40 mg tablet    tiotropium (SPIRIVA) 18 mcg inhalation capsule    Tiotropium Bromide-Olodaterol (STIOLTO RESPIMAT) 2 5-2 5 MCG/ACT AERS     Allergies   Allergen Reactions    Metformin GI Intolerance       Objective   First Vitals:   Blood Pressure: 140/80 (04/27/18 1435)  Pulse: 103 (04/27/18 1435)  Temperature: 98 4 °F (36 9 °C) (04/27/18 1435)  Temp Source: Oral (04/27/18 2057)  Respirations: (!) 30 (04/27/18 1435)  Height: 6' (182 9 cm) (04/27/18 2057)  Weight - Scale: 118 kg (260 lb) (04/27/18 1435)  SpO2: 94 % (04/27/18 1435)    Current Vitals:   Blood Pressure: 153/69 (04/28/18 0703)  Pulse: 82 (04/28/18 0703)  Temperature: 97 8 °F (36 6 °C) (04/28/18 0703)  Temp Source: Oral (04/28/18 0703)  Respirations: 20 (04/28/18 0703)  Height: 6' (182 9 cm) (04/27/18 2057)  Weight - Scale: 124 kg (273 lb 5 9 oz) (04/27/18 2057)  SpO2: 95 % (04/28/18 0741)        /69 (BP Location: Right arm)   Pulse 82   Temp 97 8 °F (36 6 °C) (Oral)   Resp 20   Ht 6' (1 829 m)   Wt 124 kg (273 lb 5 9 oz)   SpO2 95%   BMI 37 08 kg/m²      General Appearance:    Alert, cooperative, no distress   Head:    Normocephalic, without obvious abnormality, atraumatic   Eyes:    PERRL, conjunctiva/corneas clear, EOM's intact        Nose:   Moist mucous membranes   Neck:   Supple, symmetrical, trachea midline   Back:     Symmetric   Lungs:     Respirations unlabored   Heart:    Regular rate and rhythm, S1 and S2 normal, no murmur, rub   or gallop   Abdomen:     Soft, non-tender   Extremities:   Manual muscle testing 5/5 to all compartments bilaterally  Pulses:   Faintly palpable due to indurated skin   Cap refill time is brisk to all digits   Skin:   Venous stasis dermatitis with hyperpigmentation noted to bilateral extremities  Multiple wounds/eschar/excoriation noted to lower legs with serous drainage  Thin atrophic skin    Neurologic:   Gross sensation is intact  Protective sensation is diminished                 Lab Results:   Admission on 04/27/2018   Component Date Value    WBC 04/27/2018 13 52*    RBC 04/27/2018 5 18     Hemoglobin 04/27/2018 13 9     Hematocrit 04/27/2018 45 6     MCV 04/27/2018 88     MCH 04/27/2018 26 8     MCHC 04/27/2018 30 5*    RDW 04/27/2018 16 1*    MPV 04/27/2018 9 8     Platelets 28/42/0628 177     nRBC 04/27/2018 0     Neutrophils Relative 04/27/2018 83*    Lymphocytes Relative 04/27/2018 11*    Monocytes Relative 04/27/2018 5     Eosinophils Relative 04/27/2018 1     Basophils Relative 04/27/2018 0     Neutrophils Absolute 04/27/2018 11 28*    Lymphocytes Absolute 04/27/2018 1 42     Monocytes Absolute 04/27/2018 0 61     Eosinophils Absolute 04/27/2018 0 11     Basophils Absolute 04/27/2018 0 01     Sodium 04/27/2018 137     Potassium 04/27/2018 4 6     Chloride 04/27/2018 98*    CO2 04/27/2018 34*    Anion Gap 04/27/2018 5     BUN 04/27/2018 16     Creatinine 04/27/2018 1 48*    Glucose 04/27/2018 325*    Calcium 04/27/2018 8 3     eGFR 04/27/2018 47     Troponin I 04/27/2018 <0 02     pH, Dwain 04/27/2018 7 328     pCO2, Dwain 04/27/2018 71 8*    pO2, Dwain 04/27/2018 76 4*    HCO3, Dwain 04/27/2018 36 8*    Base Excess, Dwain 04/27/2018 7 9     O2 Content, Dwain 04/27/2018 18 5     O2 HGB, VENOUS 04/27/2018 89 4*    NT-proBNP 04/27/2018 523*    Ventricular Rate 04/27/2018 110     Atrial Rate 04/27/2018 110     QRSD Interval 04/27/2018 122     QT Interval 04/27/2018 358     QTC Interval 04/27/2018 484     P Axis 04/27/2018 93     QRS [place] 04/27/2018 127     T Wave Axis 04/27/2018 56     POC Glucose 04/27/2018 >500*    Sodium 04/27/2018 137     Potassium 04/27/2018 4 9     Chloride 04/27/2018 96*    CO2 04/27/2018 31     Anion Gap 04/27/2018 10     BUN 04/27/2018 20     Creatinine 04/27/2018 1 69*    Glucose 04/27/2018 497*    Calcium 04/27/2018 8 6     eGFR 04/27/2018 40     POC Glucose 04/28/2018 359*    POC Glucose 04/28/2018 349*                   Invalid input(s): LABAEARO            Imaging: I have personally reviewed pertinent films in PACS  EKG, Pathology, and Other Studies: I have personally reviewed pertinent reports        Code Status: Prior  Advance Directive and Living Will:      Power of :    POLST:

## 2018-04-28 NOTE — PROGRESS NOTES
Progress Note - Amrita Treadwell Sr  1945, 81 y o  male MRN: [id number]    Unit/Bed#: [facility] 613-01 Encounter: [id number]    Primary Care Provider: Justine Lee MD   Date and time admitted to hospital: 4/27/2018  2:37 PM        * Acute on chronic respiratory failure with hypoxia and hypercapnia (HCC)   Assessment & Plan    Bilateral wheezing, active respiratory distress on clinical exam  Bronchodilator therapy as scheduled  Continue oxygen supplementation, at baseline 3 L nasal cannula, BiPAP 20/12 60% if clinical condition deteriorates  Continue Solu-Medrol 60 q 6h  Pulmonology consult  Peak flow daily  Continue antibiotics for now        Diabetic neuropathy ([address] )   Assessment & Plan    Chronic  Continue above management        Essential hypertension   Assessment & Plan    Continue blood pressure monitoring  BP elevated on admission  Continue meds          Chronic kidney disease, stage 3   Assessment & Plan    Creatinine 1 48, similar compared to baseline  Monitor BMP for BUN creatinine daily        COPD (chronic obstructive pulmonary disease) ([address] )   Assessment & Plan    Continue above management for acute on chronic respiratory failure        Venous stasis dermatitis of both lower extremities   Assessment & Plan    Redness, edema, oozing from multiple that wounds midtibial area  Podiatry consult  Started unasyn  Wound care consult  Monitor clinically  Dry dressings for now        Type 2 diabetes mellitus with complication (HCC)   Assessment & Plan    Continue home meds  Continue blood glucose monitoring  A1c 6 8% 6 months ago  Repeat A1c today  Carb controlled diet  Counseling provided on diet exercise and weight loss          Dyslipidemia   Assessment & Plan    Continue statin        Obesity (BMI 30-39  9)   Assessment & Plan    Counseling provided on diet exercise and weight loss  Continue above management              VTE Pharmacologic Prophylaxis:   Pharmacologic: Coumadin  Mechanical VTE Prophylaxis in Place: No    Patient Centered Rounds: I have performed bedside rounds with nursing staff today  Time Spent for Care: 15 minutes  More than 50% of total time spent on counseling and coordination of care as described above  Current Length of Stay: 1 day(s)    Current Patient Status: Inpatient   Certification Statement: The patient will continue to require additional inpatient hospital stay due to Need to monitor symptoms        Code Status: Prior      Subjective:   Patient comfortable    Objective:     Vitals:   Temp (24hrs), Av 1 °F (36 7 °C), Min:97 8 °F (36 6 °C), Max:98 4 °F (36 9 °C)    HR:  [] 82  Resp:  [20-30] 20  BP: (140-178)/(65-80) 153/69  SpO2:  [94 %-97 %] 95 %  Body mass index is 37 08 kg/m²  Input and Output Summary (last 24 hours): Intake/Output Summary (Last 24 hours) at 18 0858  Last data filed at 18 4550   Gross per 24 hour   Intake              890 ml   Output                0 ml   Net              890 ml       Physical Exam:     Physical Exam   Constitutional: He is oriented to person, place, and time  He appears well-developed  HENT:   Head: Normocephalic and atraumatic  Eyes: Conjunctivae are normal  Pupils are equal, round, and reactive to light  Neck: Normal range of motion  Neck supple  No tracheal deviation present  No thyromegaly present  Cardiovascular:   No murmur heard  Pulmonary/Chest: Effort normal and breath sounds normal  No respiratory distress  He has no wheezes  Abdominal: Soft  Bowel sounds are normal    Musculoskeletal: He exhibits edema  Neurological: He is alert and oriented to person, place, and time  Skin: Skin is warm and dry         Additional Data:     Labs:      Results from last 7 days  Lab Units 18  1522   WBC Thousand/uL 13 52*   HEMOGLOBIN g/dL 13 9   HEMATOCRIT % 45 6   PLATELETS Thousands/uL 177   NEUTROS PCT % 83*   LYMPHS PCT % 11*   MONOS PCT % 5   EOS PCT % 1       Results from last 7 days  Lab Units 04/27/18  2250   SODIUM mmol/L 137   POTASSIUM mmol/L 4 9   CHLORIDE mmol/L 96*   CO2 mmol/L 31   BUN mg/dL 20   CREATININE mg/dL 1 69*   CALCIUM mg/dL 8 6   GLUCOSE RANDOM mg/dL 497*           * I Have Reviewed All Lab Data Listed Above  * Additional Pertinent Lab Tests Reviewed:  All Labs Within Last 24 Hours Reviewed        Recent Cultures (last 7 days):           Last 24 Hours Medication List:     Current Facility-Administered Medications:  ampicillin-sulbactam 3 g Intravenous Q6H Chasity Jensen MD Last Rate: 3 g (04/28/18 0852)   aspirin 81 mg Oral Daily Chasity Jensen MD    azithromycin 250 mg Intravenous Q24H Chasity Jensen MD    budesonide 0 5 mg Nebulization Q12H Chasity Jensen MD    budesonide-formoterol 2 puff Inhalation BID Chasity Jensen MD    cyclobenzaprine 10 mg Oral HS Chasity Jensen MD    ferrous sulfate 325 mg Oral Daily With Breakfast Chasity Jensen MD    fish oil 1,000 mg Oral BID Chasity Jensen MD    fluticasone 1 spray Nasal Daily Chasity Jensen MD    furosemide 40 mg Oral Daily Chasity Jensen MD    insulin lispro 1-6 Units Subcutaneous HS Marcia Workman PA-C    insulin lispro 2-12 Units Subcutaneous TID AC Marcia Workman PA-C    insulin regular 25 Units Subcutaneous Before Glacollette Lindsey MD    insulin regular 45 Units Subcutaneous BID before breakfast/lunch Chasity Jensen MD    levalbuterol 1 25 mg Nebulization TID Chasity Jensen MD    levalbuterol 1 25 mg Nebulization Q6H PRN Chasity Jensen MD    methylPREDNISolone sodium succinate 60 mg Intravenous Q6H Tesha Sánchez MD    metoprolol tartrate 25 mg Oral BID Chasity Jensen MD    oxyCODONE-acetaminophen 1 tablet Oral Q6H PRN Chasity Jensen MD    pantoprazole 40 mg Oral BID AC Chasity Jensen MD    potassium chloride 20 mEq Oral BID Chasity Jensen MD    pravastatin 20 mg Oral Daily With Glacollette Lindsey MD    sodium chloride 3 mL Nebulization Q6H PRN Chasity Jensen MD    sodium chloride 3 mL Nebulization MARLENE Jasmine MD         Today, Patient Was Seen By: Judy Gracia DO    ** Please Note: Dictation voice to text software may have been used in the creation of this document   **

## 2018-04-28 NOTE — RESPIRATORY THERAPY NOTE
RT Protocol Note  Yael Ricardo Sr  81 y o  male MRN: [id number]  Unit/Bed#: [facility] 745-53 Encounter: [id number]    Assessment    Active Problems:    Obesity (BMI 30-39  9)    Dyslipidemia    Type 2 diabetes mellitus with complication (HCC)    Venous stasis dermatitis of both lower extremities    Acute on chronic respiratory failure with hypoxia and hypercapnia (HCC)    COPD [facility]chronic obstructive [facility] disease) [facility])    Chronic kidney disease, stage 3    Essential hypertension    Diabetic neuropathy (HCC)      Home Pulmonary Medications:  albuteral 2 5mg/nss bid       Past Medical History:   Diagnosis Date    Abdominal pain     Cardiac disease     [facility]     Coronary artery disease     Diabetes mellitus ([place] [address] )     Hyperlipidemia     Hypertension     MI (myocardial infarction) ([place] [address] )     with 3 stents    Prostate cancer ([place] [address] )      Social History     Social History    Marital status: /Civil Union     Spouse name: N/A    Number of children: N/A    Years of education: N/A     Social History Main Topics    Smoking status: Former Smoker     Packs/day: 1 50     Years: 50 00     Quit date: 9/13/2017    Smokeless tobacco: Never Used    Alcohol use No    Drug use: No    Sexual activity: Not Asked     Other Topics Concern    None     Social History Narrative    None       Subjective    Subjective Data: (P) Denies sob/resp distress  States he has not received a udn tx today  Pt given tx @ this time  Objective    Physical Exam:   Assessment Type: (P) Pre-treatment  General Appearance: (P) Alert, Awake  Respiratory Pattern: (P) Normal  Chest Assessment: (P) Chest expansion symmetrical  Bilateral Breath Sounds: (P) Diminished, Coarse  R Breath Sounds: (P) Diminished, Coarse  L Breath Sounds: (P) Diminished, Coarse  Cough: None  O2 Device: NC    Vitals:  Blood pressure 145/72, pulse (!) 111, temperature 98 3 °F (36 8 °C), temperature source Oral, resp   rate (!) 24, height 6' (1 829 m), weight 124 kg (273 lb 5 9 oz), SpO2 94 %  Imaging and other studies: I have personally reviewed pertinent reports  O2 Device: NC     Plan    Respiratory Plan: (P) Home Bronchodilator Patient pathway        Resp Comments: (P) Pt evaluated per resp protocol  Pt has a hx of COPD for which he uses albuteral 2 5mg/nss bid @ home   Will order pt on xopenex 1 25mg/nss tid and q6prn while he remains in the hospital

## 2018-04-28 NOTE — CASE MANAGEMENT
Initial Clinical Review    Thank you,  [facility] in the [facility] by Onofre Carroll for 2017  Network Utilization Review Department  Phone: [phone]; Fax [phone]  ATTENTION: The Network Utilization Review Department is now centralized for our 7 Facilities  Make a note that we have a new phone and fax numbers for our Department  Please call with any questions or concerns to [phone] and carefully follow the prompts so that you are directed to the right person  All voicemails are confidential  Fax any determinations, approvals, denials, and requests for initial or continue stay review clinical to [phone]  Due to HIGH CALL volume, it would be easier if you could please send faxed requests to expedite your requests and in part, help us provide discharge notifications faster  Admission: Date/Time/Statement: 4/27/18 @ 1704     Orders Placed This Encounter   Procedures    Inpatient Admission (expected length of stay for this patient is greater than two midnights)     Standing Status:   Standing     Number of Occurrences:   1     Order Specific Question:   Admitting Physician     Answer:   Chestine Para     Order Specific Question:   Level of Care     Answer:   Med Surg [16]     Order Specific Question:   Estimated length of stay     Answer:   More than 2 Midnights     Order Specific Question:   Certification     Answer:   I certify that inpatient services are medically necessary for this patient for a duration of greater than two midnights  See H&P and MD Progress Notes for additional information about the patient's course of treatment           ED: Date/Time/Mode of Arrival:   ED Arrival Information     Expected Arrival Acuity Means of Arrival Escorted By Service Admission Type    - 4/27/2018 14:19 Emergent Wheelchair Family Member General Medicine Emergency    Arrival Complaint    medical problem          Chief Complaint:   Chief Complaint   Patient presents with    Shortness of Breath     SOB since this morning, home 02 @ 2 liters       History of Illness: 81 y o  male  with past medical history of grade 1 diastolic dysfunction CHF,  COPD, history of pleural effusion, thoracocentesis on prior admission to 02/2018, chronic hypoxic respiratory failure on nasal cannula, allergic to Neurontin, diabetes mellitus, chronic venous stasis dermatitis of bilateral lower extremities, dyslipidemia, morbid obesity, CKD stage 3 was brought to the ER with complaints of shortness of breath which started last night, worsening  nonproductive cough (feels like phelgm is in his throat),  denied any phlegm expectorated, fever, chills, nausea, vomiting, change in bowel urinary habits, recent ill contacts  He reported bilateral lower extremity edema with lesions present on left lower extremity with bruising and erythema  Exam:  Constitutional: He is oriented to person, place, and time  He appears distressed  Morbidly obese man on 3 L nasal cannula   Pulmonary/Chest: He is in respiratory distress  He has wheezes  He has no rales  He exhibits no tenderness  Musculoskeletal: Normal range of motion  He exhibits edema and tenderness  He exhibits no deformity  Bilateral lower extremity edema, with multiple circumferential rashes anterior tibial area with redness       ED Vital Signs:   ED Triage Vitals   Temperature Pulse Respirations Blood Pressure SpO2   04/27/18 1435 04/27/18 1435 04/27/18 1435 04/27/18 1435 04/27/18 1435   98 4 °F (36 9 °C) 103 (!) 30 140/80 94 %      Temp Source Heart Rate Source Patient Position - Orthostatic VS BP Location FiO2 (%)   04/27/18 2057 04/27/18 2057 04/27/18 2330 04/27/18 2057 --   Oral Monitor Lying Right arm       Pain Score       04/27/18 1435       Worst Possible Pain        Wt Readings from Last 1 Encounters:   04/27/18 124 kg (273 lb 5 9 oz)       Vital Signs:  04/27 0701  04/28 0700 04/28 0701  04/28 1816  Most Recent Temperature (°F) 9898 4 97 898  98 (36 7)    Pulse 99111 8088  88    Respirations 2230 20  20    Blood Pressure 140/80178/80 147/67153/69  147/67    SpO2 (%) 9497 9496  96        Abnormal Labs/Diagnostic Test Results:    Blood gas, venous      Ref Range & Units 4/27/18 1522 Flag   pH, Dwain 7 300 - 7 400 7 328     pCO2, Dwani 42 0 - 50 0 mm Hg 71 8   H    pO2, Dwain 35 0 - 45 0 mm Hg 76 4   H    HCO3, Dwain 24 - 30 mmol/L 36 8   H    Base Excess, Dwain mmol/L 7 9     O2 Content, Dwain ml/dL 18 5     O2 HGB, VENOUS 60 0 - 80 0 % 89 4   H              Lab Units 04/27/18  1522   WBC Thousand/uL 13 52*   HEMOGLOBIN g/dL 13 9   HEMATOCRIT % 45 6   PLATELETS Thousands/uL 177   NEUTROS PCT % 83*   LYMPHS PCT % 11*   MONOS PCT % 5   EOS PCT % 1      Lab Units 04/27/18  1522   SODIUM mmol/L 137   POTASSIUM mmol/L 4 6   CHLORIDE mmol/L 98*   CO2 mmol/L 34*   BUN mg/dL 16   CREATININE mg/dL 1 48*   CALCIUM mg/dL 8 3   GLUCOSE RANDOM mg/dL 325*     CXR -- Stable cardiomegaly and right pleural effusion    EKG -- Sinus tachycardia with occasional Premature ventricular complexes  Right bundle branch block  Possible Lateral infarct , age undetermined  Possible Inferior infarct , age undetermined      ED Treatment:   Medication Administration from 04/27/2018 1419 to 04/27/2018 2001       Date/Time Order Dose Route Action Action by Comments     04/27/2018 1458 albuterol inhalation solution 10 mg 10 mg Nebulization Given Anay L Sensenig, RT      04/27/2018 1458 ipratropium (ATROVENT) 0 02 % inhalation solution 1 mg 1 mg Nebulization Given Anay L Sensenig, RT      04/27/2018 1458 sodium chloride 0 9 % inhalation solution 3 mL 3 mL Nebulization Given Anay L Sensenig, RT      04/27/2018 1543 methylPREDNISolone sodium succinate (Solu-MEDROL) injection 125 mg 125 mg Intravenous Given Braxton Aguilera, RN      04/27/2018 1543 azithromycin (ZITHROMAX) 500 mg in sodium chloride 0 9% 250mL IVPB 500 mg 500 mg Intravenous Rene Sampson Phillip Nowak RN      04/27/2018 1806 albuterol inhalation solution 5 mg 5 mg Nebulization Given Marjorie Mckinnon RN      04/27/2018 1908 methylPREDNISolone sodium succinate (Solu-MEDROL) injection 60 mg 60 mg Intravenous Not Given Marjorie Mckinnon RN received at 0  dr Agustin Orlando aware          Past Medical/Surgical History:   Past Medical History:   Diagnosis Date    Abdominal pain     Cardiac disease     CHF (congestive heart failure) ([place] [address] )     COPD (chronic obstructive pulmonary disease) ([place] [address] )     Coronary artery disease     Diabetes mellitus ([place] [address] )     Hyperlipidemia     Hypertension     MI (myocardial infarction) ([place] [address] )     Prostate cancer ([place] [address] )        Admitting Diagnosis: COPD exacerbation ([place] [address] ) [J44 1]  Cellulitis of left lower extremity [L03 116]  History of medical problems [Z87 898]    Age/Sex: 81 y o  male    Assessment/Plan:   Acute on chronic respiratory failure with hypoxia and hypercapnia (HCC)   Assessment & Plan     Bilateral wheezing, active respiratory distress on clinical exam  Chest x-ray with stable cardiomegaly and right pleural effusion for 04/27/2018  Blood gas on exam hypoxia with CO2 retention  Leukocytosis 13 52  ProBNP 523  Troponin less than 0 02  EKG  Sinus tachycardia with occasional Premature ventricular complexes  Right bundle branch block, Possible Lateral infarct , age undetermined, Possible Inferior infarct , age undetermined  Confirmed by MARIE MCHUGH MD ([id number]) on 4/27/2018 3:36:28 PM  Bronchodilator therapy as scheduled  Continue oxygen supplementation, at baseline 3 L nasal cannula, BiPAP 20/12 60% if clinical condition deteriorates  Continue Solu-Medrol 60 q 6h  Pulmonology consult  Peak flow daily  Continue antibiotics for now          Diabetic neuropathy (HCC)   Assessment & Plan     Chronic  Continue above management          Essential hypertension   Assessment & Plan     Continue blood pressure monitoring  BP elevated on admission  Continue meds             Chronic kidney disease, stage 3   Assessment & Plan     Creatinine 1 48, similar compared to baseline  Monitor BMP for BUN creatinine daily          COPD (chronic obstructive pulmonary disease) (HCC)   Assessment & Plan     Continue above management for acute on chronic respiratory failure          Venous stasis dermatitis of both lower extremities   Assessment & Plan     Redness, edema, oozing from multiple that wounds midtibial area of left leg  Podiatry consult  Started unasyn, for acute cellulitis  Wound care consult  Monitor clinically  Dry dressings for now          Type 2 diabetes mellitus with complication ([facility])   Assessment & Plan     Continue home meds  Continue blood glucose monitoring  A1c 6 8% 6 months ago  Repeat A1c today  Carb controlled diet  Counseling provided on diet exercise and weight loss             Dyslipidemia   Assessment & Plan     Continue statin          Obesity (BMI 30-39  9)   Assessment & Plan     Counseling provided on diet exercise and weight loss  Continue above management                 VTE Prophylaxis: Warfarin (Coumadin)  / sequential compression device   Code Status: full code  POLST: POLST form is not discussed and not completed at this time  Discussion with family: no     Anticipated Length of Stay:  Patient will be admitted on an Inpatient basis with an anticipated length of stay of >2 midnights     Justification for Hospital Stay:  Medical management of COPD exacerbation      Admission Orders:  M/S/Tele unit  Telem  O2 3 lpm nc  Bipap as needed 20/12, FiO2 60%  Monitor I&O's  accuchecks qid w/ ssi  Cons carb diet  Consult pulmonology, podiatry, wound care    Scheduled Meds:   Current Facility-Administered Medications:  ampicillin-sulbactam 3 g Intravenous Q6H Milus MD Pasquale Last Rate: 3 g (04/28/18 1336)   aspirin 81 mg Oral Daily Milus Hence, MD    budesonide 0 5 mg Nebulization Q12H Milus MD Pasquale    budesonide-formoterol 2 puff Inhalation BID Milus MD Pasquale cyclobenzaprine 10 mg Oral HS Jen Jamison MD    enoxaparin 40 mg Subcutaneous Q24H [facility] Harriet Rock DO    ferrous sulfate 325 mg Oral Daily With Breakfast Jen Jamison MD    fish oil 1,000 mg Oral BID Jen Jamison MD    fluticasone 1 spray Nasal Daily Jen Jamison MD    furosemide 40 mg Oral Daily Jen Jamison MD    insulin lispro 1-6 Units Subcutaneous HS Marcia Workman PA-C    insulin lispro 2-12 Units Subcutaneous TID AC Marcia Workman PA-C    insulin regular 30 Units Subcutaneous Before Dinner Georgina Ore, DO    insulin regular 50 Units Subcutaneous BID before breakfast/lunch Marisol Gerardo DO    levalbuterol 1 25 mg Nebulization TID Jen Jamison MD    levalbuterol 1 25 mg Nebulization Q6H PRN Jen Jamison MD    methylPREDNISolone sodium succinate 40 mg Intravenous Q8H [facility] Harriet Rock DO    metoprolol tartrate 25 mg Oral BID Jen Jamison MD    oxyCODONE-acetaminophen 1 tablet Oral Q6H PRN Jen Jamison MD    pantoprazole 40 mg Oral BID AC Jen Jamison MD    potassium chloride 20 mEq Oral BID Jen Jamison MD    pravastatin 20 mg Oral Daily With Lindsay Ferguson MD    sodium chloride 3 mL Nebulization Q6H PRN Jen Jamison MD    sodium chloride 3 mL Nebulization TID Jen Jamison MD      Continuous Infusions:    PRN Meds: levalbuterol    oxyCODONE-acetaminophen    sodium chloride

## 2018-04-29 LAB
GLUCOSE SERPL-MCNC: 312 MG/DL (ref 65–140)
GLUCOSE SERPL-MCNC: 324 MG/DL (ref 65–140)
GLUCOSE SERPL-MCNC: 381 MG/DL (ref 65–140)
GLUCOSE SERPL-MCNC: 436 MG/DL (ref 65–140)
INR PPP: 1.02 (ref 0.86–1.16)
PROTHROMBIN TIME: 13.4 SECONDS (ref 12.1–14.4)

## 2018-04-29 PROCEDURE — 85610 PROTHROMBIN TIME: CPT | Performed by: INTERNAL MEDICINE

## 2018-04-29 PROCEDURE — 94760 N-INVAS EAR/PLS OXIMETRY 1: CPT

## 2018-04-29 PROCEDURE — 82948 REAGENT STRIP/BLOOD GLUCOSE: CPT

## 2018-04-29 PROCEDURE — 99232 SBSQ HOSP IP/OBS MODERATE 35: CPT | Performed by: INTERNAL MEDICINE

## 2018-04-29 PROCEDURE — 94640 AIRWAY INHALATION TREATMENT: CPT

## 2018-04-29 RX ORDER — CYCLOBENZAPRINE HCL 10 MG
10 TABLET ORAL ONCE
Status: COMPLETED | OUTPATIENT
Start: 2018-04-29 | End: 2018-04-29

## 2018-04-29 RX ORDER — CYCLOBENZAPRINE HCL 10 MG
10 TABLET ORAL 2 TIMES DAILY
Status: DISCONTINUED | OUTPATIENT
Start: 2018-04-29 | End: 2018-04-30 | Stop reason: HOSPADM

## 2018-04-29 RX ADMIN — ISODIUM CHLORIDE 3 ML: 0.03 SOLUTION RESPIRATORY (INHALATION) at 13:20

## 2018-04-29 RX ADMIN — FLUTICASONE PROPIONATE 1 SPRAY: 50 SPRAY, METERED NASAL at 08:41

## 2018-04-29 RX ADMIN — CYCLOBENZAPRINE HYDROCHLORIDE 10 MG: 10 TABLET, FILM COATED ORAL at 20:45

## 2018-04-29 RX ADMIN — ASPIRIN 81 MG: 81 TABLET, COATED ORAL at 08:41

## 2018-04-29 RX ADMIN — Medication 1000 MG: at 08:41

## 2018-04-29 RX ADMIN — ISODIUM CHLORIDE 3 ML: 0.03 SOLUTION RESPIRATORY (INHALATION) at 19:41

## 2018-04-29 RX ADMIN — BUDESONIDE 0.5 MG: 0.5 INHALANT RESPIRATORY (INHALATION) at 07:20

## 2018-04-29 RX ADMIN — METOPROLOL TARTRATE 25 MG: 25 TABLET ORAL at 17:31

## 2018-04-29 RX ADMIN — POTASSIUM CHLORIDE 20 MEQ: 1500 TABLET, EXTENDED RELEASE ORAL at 08:41

## 2018-04-29 RX ADMIN — SODIUM CHLORIDE 3 G: 9 INJECTION, SOLUTION INTRAVENOUS at 02:06

## 2018-04-29 RX ADMIN — INSULIN LISPRO 10 UNITS: 100 INJECTION, SOLUTION INTRAVENOUS; SUBCUTANEOUS at 12:48

## 2018-04-29 RX ADMIN — PANTOPRAZOLE SODIUM 40 MG: 40 TABLET, DELAYED RELEASE ORAL at 17:32

## 2018-04-29 RX ADMIN — LEVALBUTEROL HYDROCHLORIDE 1.25 MG: 1.25 SOLUTION, CONCENTRATE RESPIRATORY (INHALATION) at 19:41

## 2018-04-29 RX ADMIN — METOPROLOL TARTRATE 25 MG: 25 TABLET ORAL at 08:41

## 2018-04-29 RX ADMIN — CYCLOBENZAPRINE HYDROCHLORIDE 10 MG: 10 TABLET, FILM COATED ORAL at 17:31

## 2018-04-29 RX ADMIN — SODIUM CHLORIDE 3 G: 9 INJECTION, SOLUTION INTRAVENOUS at 08:41

## 2018-04-29 RX ADMIN — BUDESONIDE AND FORMOTEROL FUMARATE DIHYDRATE 2 PUFF: 160; 4.5 AEROSOL RESPIRATORY (INHALATION) at 08:40

## 2018-04-29 RX ADMIN — INSULIN LISPRO 8 UNITS: 100 INJECTION, SOLUTION INTRAVENOUS; SUBCUTANEOUS at 17:32

## 2018-04-29 RX ADMIN — INSULIN HUMAN 30 UNITS: 500 INJECTION, SOLUTION SUBCUTANEOUS at 17:33

## 2018-04-29 RX ADMIN — METHYLPREDNISOLONE SODIUM SUCCINATE 40 MG: 40 INJECTION, POWDER, FOR SOLUTION INTRAMUSCULAR; INTRAVENOUS at 21:49

## 2018-04-29 RX ADMIN — OXYCODONE HYDROCHLORIDE AND ACETAMINOPHEN 1 TABLET: 5; 325 TABLET ORAL at 20:45

## 2018-04-29 RX ADMIN — SODIUM CHLORIDE 3 G: 9 INJECTION, SOLUTION INTRAVENOUS at 13:20

## 2018-04-29 RX ADMIN — SODIUM CHLORIDE 3 G: 9 INJECTION, SOLUTION INTRAVENOUS at 19:42

## 2018-04-29 RX ADMIN — LEVALBUTEROL HYDROCHLORIDE 1.25 MG: 1.25 SOLUTION, CONCENTRATE RESPIRATORY (INHALATION) at 07:20

## 2018-04-29 RX ADMIN — POTASSIUM CHLORIDE 20 MEQ: 1500 TABLET, EXTENDED RELEASE ORAL at 17:31

## 2018-04-29 RX ADMIN — LEVALBUTEROL HYDROCHLORIDE 1.25 MG: 1.25 SOLUTION, CONCENTRATE RESPIRATORY (INHALATION) at 13:20

## 2018-04-29 RX ADMIN — INSULIN LISPRO 12 UNITS: 100 INJECTION, SOLUTION INTRAVENOUS; SUBCUTANEOUS at 08:44

## 2018-04-29 RX ADMIN — FUROSEMIDE 40 MG: 40 TABLET ORAL at 08:41

## 2018-04-29 RX ADMIN — ENOXAPARIN SODIUM 40 MG: 40 INJECTION SUBCUTANEOUS at 08:44

## 2018-04-29 RX ADMIN — PANTOPRAZOLE SODIUM 40 MG: 40 TABLET, DELAYED RELEASE ORAL at 06:29

## 2018-04-29 RX ADMIN — INSULIN HUMAN 50 UNITS: 500 INJECTION, SOLUTION SUBCUTANEOUS at 08:49

## 2018-04-29 RX ADMIN — BUDESONIDE AND FORMOTEROL FUMARATE DIHYDRATE 2 PUFF: 160; 4.5 AEROSOL RESPIRATORY (INHALATION) at 17:32

## 2018-04-29 RX ADMIN — INSULIN LISPRO 5 UNITS: 100 INJECTION, SOLUTION INTRAVENOUS; SUBCUTANEOUS at 21:48

## 2018-04-29 RX ADMIN — PRAVASTATIN SODIUM 20 MG: 20 TABLET ORAL at 17:31

## 2018-04-29 RX ADMIN — METHYLPREDNISOLONE SODIUM SUCCINATE 40 MG: 40 INJECTION, POWDER, FOR SOLUTION INTRAMUSCULAR; INTRAVENOUS at 13:20

## 2018-04-29 RX ADMIN — FERROUS SULFATE TAB 325 MG (65 MG ELEMENTAL FE) 325 MG: 325 (65 FE) TAB at 08:40

## 2018-04-29 RX ADMIN — Medication 1000 MG: at 17:31

## 2018-04-29 RX ADMIN — OXYCODONE HYDROCHLORIDE AND ACETAMINOPHEN 1 TABLET: 5; 325 TABLET ORAL at 12:47

## 2018-04-29 RX ADMIN — METHYLPREDNISOLONE SODIUM SUCCINATE 40 MG: 40 INJECTION, POWDER, FOR SOLUTION INTRAMUSCULAR; INTRAVENOUS at 06:29

## 2018-04-29 RX ADMIN — CYCLOBENZAPRINE HYDROCHLORIDE 10 MG: 10 TABLET, FILM COATED ORAL at 08:43

## 2018-04-29 RX ADMIN — BUDESONIDE 0.5 MG: 0.5 INHALANT RESPIRATORY (INHALATION) at 19:41

## 2018-04-29 RX ADMIN — INSULIN HUMAN 50 UNITS: 500 INJECTION, SOLUTION SUBCUTANEOUS at 12:47

## 2018-04-29 NOTE — PROGRESS NOTES
Pt requested ble dressings be changed due to them being uncomfortable  Spoke with podiatry resident who states it's ok to change per pt request  Dressing changed  Applied adaptic and maxorb to open yo on lle with dcd and ace wrap from toes to infrapatellar notch  Applied dcd and ace wrap to rle  Noted all areas to be scabbed and dry

## 2018-04-29 NOTE — PLAN OF CARE
Problem: DISCHARGE PLANNING - CARE MANAGEMENT  Goal: Discharge to post-acute care or home with appropriate resources  INTERVENTIONS:  - Conduct assessment to determine patient/family and health care team treatment goals, and need for post-acute services based on payer coverage, community resources, and patient preferences, and barriers to discharge  - Address psychosocial, clinical, and financial barriers to discharge as identified in assessment in conjunction with the patient/family and health care team  - Arrange appropriate level of post-acute services according to patient's   needs and preference and payer coverage in collaboration with the physician and health care team  - Communicate with and update the patient/family, physician, and health care team regarding progress on the discharge plan  - Arrange appropriate transportation to post-acute venues  - Home w family support  Outcome: Progressing

## 2018-04-29 NOTE — SOCIAL WORK
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, retired & drives  DME cane  H/o [facility]  H/o rhb at [employer]  Denies any MH, D&A tx  Uses [facility] on [address] [facility]  No POA  Emergency contact, wife  Kory Phillips [phone]  Anticipate no needs  Informed will take preferences into account if any services are needed  Son to transport home  Patient/caregiver received discharge checklist  Content reviewed  Patient/caregiver encouraged to participate in discharge plan of care prior to discharge home  CM reviewed d/c planning process including the following: identifying help at home, patient preference for d/c planning needs, Discharge Lounge, [facility] Meds to Bed program, availability of treatment team to discuss questions or concerns patient and/or family may have regarding understanding medications and recognizing signs and symptoms once discharged  CM also encouraged patient to follow up with all recommended appointments after discharge  Patient advised of importance for patient and family to participate in managing patients medical well being

## 2018-04-29 NOTE — PROGRESS NOTES
Progress Note - Esther Bridges   1945, 81 y o  male MRN: [id number]    Unit/Bed#: [facility] 613-01 Encounter: [id number]    Primary Care Provider: Joyce Benz MD   Date and time admitted to hospital: 4/27/2018  2:37 PM        * Acute on chronic respiratory failure with hypoxia and hypercapnia (HCC)   Assessment & Plan    Bilateral wheezing, active respiratory distress on clinical exam  Bronchodilator therapy as scheduled  Continue oxygen supplementation, at baseline 3 L nasal cannula, BiPAP 20/12 60% if clinical condition deteriorates  Continue Solu-Medrol 60 q 6h  Pulmonology consult  Peak flow daily  Continue antibiotics for now        Diabetic neuropathy ([facility] [address] )   Assessment & Plan    Chronic  Continue above management        Essential hypertension   Assessment & Plan    Continue blood pressure monitoring  BP elevated on admission  Continue meds          Chronic kidney disease, stage 3   Assessment & Plan    Creatinine 1 48, similar compared to baseline  Monitor BMP for BUN creatinine daily        COPD (chronic obstructive pulmonary disease) ([facility] [address] )   Assessment & Plan    Continue above management for acute on chronic respiratory failure        Venous stasis dermatitis of both lower extremities   Assessment & Plan    Redness, edema, oozing from multiple that wounds midtibial area  Podiatry consult  Started unasyn  Wound care consult  Monitor clinically  Dry dressings for now        Type 2 diabetes mellitus with complication (HCC)   Assessment & Plan    Continue home meds  Continue blood glucose monitoring  A1c 6 8% 6 months ago  Repeat A1c today  Carb controlled diet  Counseling provided on diet exercise and weight loss          Dyslipidemia   Assessment & Plan    Continue statin        Obesity (BMI 30-39  9)   Assessment & Plan    Counseling provided on diet exercise and weight loss  Continue above management          VTE Pharmacologic Prophylaxis:   Pharmacologic: Enoxaparin (Lovenox)  Mechanical VTE Prophylaxis in Place: No    Patient Centered Rounds: I have performed bedside rounds with nursing staff today  Time Spent for Care: 15 minutes  More than 50% of total time spent on counseling and coordination of care as described above  Current Length of Stay: 2 day(s)    Current Patient Status: Inpatient   Certification Statement: The patient will continue to require additional inpatient hospital stay due to Need to monitor symptoms    Discharge Plan:  Not ready for discharge    Code Status: Prior      Subjective:   Patient comfortable    Objective:     Vitals:   Temp (24hrs), Av °F (36 7 °C), Min:97 8 °F (36 6 °C), Max:98 4 °F (36 9 °C)    HR:  [80-91] 88  Resp:  [18-20] 20  BP: (124-152)/(60-94) 144/94  SpO2:  [93 %-97 %] 95 %  Body mass index is 37 08 kg/m²  Input and Output Summary (last 24 hours): Intake/Output Summary (Last 24 hours) at 18 0959  Last data filed at 18 0932   Gross per 24 hour   Intake             1580 ml   Output              675 ml   Net              905 ml       Physical Exam:     Physical Exam   Constitutional: He is oriented to person, place, and time  He appears well-developed  HENT:   Head: Normocephalic and atraumatic  Eyes: Conjunctivae are normal  Pupils are equal, round, and reactive to light  Neck: Neck supple  Cardiovascular: Normal rate and regular rhythm  No murmur heard  Pulmonary/Chest: Effort normal and breath sounds normal  No respiratory distress  Abdominal: Soft  Bowel sounds are normal  He exhibits no distension  Musculoskeletal: Normal range of motion  He exhibits no edema  Neurological: He is alert and oriented to person, place, and time  No cranial nerve deficit  Coordination normal    Skin: Skin is warm and dry  No erythema  Psychiatric: He has a normal mood and affect         Additional Data:     Labs:      Results from last 7 days  Lab Units 18  1026 18  1522   WBC Thousand/uL 10 19* 13 52* HEMOGLOBIN g/dL 13 1 13 9   HEMATOCRIT % 40 5 45 6   PLATELETS Thousands/uL 166 177   NEUTROS PCT %  --  83*   LYMPHS PCT %  --  11*   LYMPHO PCT % 3*  --    MONOS PCT %  --  5   MONO PCT MAN % 2*  --    EOS PCT %  --  1   EOSINO PCT MANUAL % 0  --        Results from last 7 days  Lab Units 04/28/18  1026   SODIUM mmol/L 136   POTASSIUM mmol/L 5 0   CHLORIDE mmol/L 96*   CO2 mmol/L 34*   BUN mg/dL 22   CREATININE mg/dL 1 26   CALCIUM mg/dL 8 7   GLUCOSE RANDOM mg/dL 388*       Results from last 7 days  Lab Units 04/29/18  0603   INR  1 02       * I Have Reviewed All Lab Data Listed Above  * Additional Pertinent Lab Tests Reviewed:  All Labs Within Last 24 Hours Reviewed        Recent Cultures (last 7 days):           Last 24 Hours Medication List:     Current Facility-Administered Medications:  ampicillin-sulbactam 3 g Intravenous Q6H Laura Mheta MD Last Rate: 3 g (04/29/18 0841)   aspirin 81 mg Oral Daily Laura Mehta, MD    budesonide 0 5 mg Nebulization Q12H Laura Mehta, MD    budesonide-formoterol 2 puff Inhalation BID Laura Mehta, MD    cyclobenzaprine 10 mg Oral BID Marcia Workman PA-C    enoxaparin 40 mg Subcutaneous Q24H [facility] Harriet Rock DO    ferrous sulfate 325 mg Oral Daily With Breakfast Laura Mehta, MD    fish oil 1,000 mg Oral BID Laura Mehta, MD    fluticasone 1 spray Nasal Daily Laura Mehta, MD    furosemide 40 mg Oral Daily Laura Mehta, MD    insulin lispro 1-6 Units Subcutaneous HS Marcia Workman PA-C    insulin lispro 2-12 Units Subcutaneous TID AC Marcia Workman PA-C    insulin regular 30 Units Subcutaneous Before Dinner Johny Abdi DO    insulin regular 50 Units Subcutaneous BID before breakfast/lunch Marisol Gerardo DO    levalbuterol 1 25 mg Nebulization TID Laura Mehta, MD    levalbuterol 1 25 mg Nebulization Q6H PRN Laura Mehta, MD    methylPREDNISolone sodium succinate 40 mg Intravenous Q8H [facility] Harriet Rock DO    metoprolol tartrate 25 mg Oral BID Mary Mckenna Cotton MD    oxyCODONE-acetaminophen 1 tablet Oral Q6H PRN Christine Patel, MD    pantoprazole 40 mg Oral BID AC Christine Patel MD    potassium chloride 20 mEq Oral BID Christine Patel, MD    pravastatin 20 mg Oral Daily With Amandeep Mckay MD    sodium chloride 3 mL Nebulization Q6H PRN Christine Patel, MD    sodium chloride 3 mL Nebulization TID Christine Patel MD         Today, Patient Was Seen By: Chance Hilton DO    ** Please Note: Dictation voice to text software may have been used in the creation of this document   **

## 2018-04-29 NOTE — PROGRESS NOTES
Progress Note - Pulmonary   Awilda óGmez Sr  81 y o  male MRN: [id number]  Unit/Bed#: [facility] 613-01 Encounter: [id number]    Assessment:  Severe COPD with FEV1 29% predicted at 1 02 L  Acute on chronic diastolic heart failure  Cellulitis  Diabetes with hyperglycemia    Plan:  Wean steroids today to 40 mg b i d  anticipation of transition to p o  in a m  Carole Haji Defer to primary team but IV insulin regimen does not appear to be working to keep blood sugars under control may consider insulin drip this point  Continue Unasyn likely transition to p o  antibiotics once blood cultures are negative  Patient would like to be discharged and is adamantly refusing BiPAP and any water pills on discharge  Will likely have difficulty controlling his venous stasis and right heart failure without the addition of Lasix and nocturnal ventilation to maintain on oxygen to keep pulse ox greater than 88%    Chief Complaint:   Feels better    Subjective:   Patient's breathing does feel better he is almost to his baseline  He denies any cough or wheeze  He feels his legs are better and no significant swelling and the redness is improved  Objective:     Vitals: Blood pressure 166/77, pulse 92, temperature 97 9 °F (36 6 °C), temperature source Oral, resp  rate 18, height 6' (1 829 m), weight 124 kg (273 lb 5 9 oz), SpO2 95 %  ,Body mass index is 37 08 kg/m²        Intake/Output Summary (Last 24 hours) at 04/29/18 1551  Last data filed at 04/29/18 1321   Gross per 24 hour   Intake             1560 ml   Output             1025 ml   Net              535 ml       Invasive Devices     Peripheral Intravenous Line            Peripheral IV 04/27/18 Right Antecubital 2 days                Physical Exam: General appearance: alert and oriented, in no acute distress  Neck: no adenopathy, no carotid bruit, no JVD, supple, symmetrical, trachea midline and thyroid not enlarged, symmetric, no tenderness/mass/nodules  Lungs: diminished breath sounds  Heart: regular rate and rhythm, S1, S2 normal, no murmur, click, rub or gallop  Abdomen: soft, non-tender; bowel sounds normal; no masses,  no organomegaly  Extremities: Unable to visualize due to bilateral wraps     Labs: CBC: No results found for: WBC, HGB, HCT, MCV, PLT, ADJUSTEDWBC, MCH, MCHC, RDW, MPV, NRBC, CMP: No results found for: NA, K, CL, CO2, ANIONGAP, BUN, CREATININE, GLUCOSE, CALCIUM, AST, ALT, ALKPHOS, PROT, ALBUMIN, BILITOT, EGFR  Imaging and other studies: I have personally reviewed pertinent films in PACS

## 2018-04-30 VITALS
HEIGHT: 72 IN | SYSTOLIC BLOOD PRESSURE: 144 MMHG | OXYGEN SATURATION: 94 % | TEMPERATURE: 97.8 F | DIASTOLIC BLOOD PRESSURE: 70 MMHG | RESPIRATION RATE: 20 BRPM | BODY MASS INDEX: 37.03 KG/M2 | WEIGHT: 273.37 LBS | HEART RATE: 88 BPM

## 2018-04-30 LAB
GLUCOSE SERPL-MCNC: 336 MG/DL (ref 65–140)
GLUCOSE SERPL-MCNC: 430 MG/DL (ref 65–140)

## 2018-04-30 PROCEDURE — 94760 N-INVAS EAR/PLS OXIMETRY 1: CPT

## 2018-04-30 PROCEDURE — 94640 AIRWAY INHALATION TREATMENT: CPT

## 2018-04-30 PROCEDURE — 99238 HOSP IP/OBS DSCHRG MGMT 30/<: CPT | Performed by: INTERNAL MEDICINE

## 2018-04-30 PROCEDURE — 82948 REAGENT STRIP/BLOOD GLUCOSE: CPT

## 2018-04-30 PROCEDURE — 99232 SBSQ HOSP IP/OBS MODERATE 35: CPT | Performed by: PHYSICIAN ASSISTANT

## 2018-04-30 RX ORDER — PREDNISONE 20 MG/1
40 TABLET ORAL DAILY
Status: DISCONTINUED | OUTPATIENT
Start: 2018-05-01 | End: 2018-04-30 | Stop reason: HOSPADM

## 2018-04-30 RX ORDER — METHYLPREDNISOLONE SODIUM SUCCINATE 40 MG/ML
20 INJECTION, POWDER, LYOPHILIZED, FOR SOLUTION INTRAMUSCULAR; INTRAVENOUS ONCE
Status: DISCONTINUED | OUTPATIENT
Start: 2018-04-30 | End: 2018-04-30 | Stop reason: HOSPADM

## 2018-04-30 RX ORDER — PREDNISONE 10 MG/1
10 TABLET ORAL DAILY
Qty: 30 TABLET | Refills: 0 | Status: SHIPPED | OUTPATIENT
Start: 2018-04-30 | End: 2018-05-03

## 2018-04-30 RX ORDER — AMOXICILLIN AND CLAVULANATE POTASSIUM 875; 125 MG/1; MG/1
1 TABLET, FILM COATED ORAL EVERY 12 HOURS SCHEDULED
Qty: 10 TABLET | Refills: 0 | Status: SHIPPED | OUTPATIENT
Start: 2018-04-30 | End: 2018-05-05

## 2018-04-30 RX ADMIN — TIOTROPIUM BROMIDE 18 MCG: 18 CAPSULE ORAL; RESPIRATORY (INHALATION) at 11:27

## 2018-04-30 RX ADMIN — SODIUM CHLORIDE 3 G: 9 INJECTION, SOLUTION INTRAVENOUS at 08:50

## 2018-04-30 RX ADMIN — BUDESONIDE 0.5 MG: 0.5 INHALANT RESPIRATORY (INHALATION) at 07:18

## 2018-04-30 RX ADMIN — OXYCODONE HYDROCHLORIDE AND ACETAMINOPHEN 1 TABLET: 5; 325 TABLET ORAL at 06:02

## 2018-04-30 RX ADMIN — METHYLPREDNISOLONE SODIUM SUCCINATE 40 MG: 40 INJECTION, POWDER, FOR SOLUTION INTRAMUSCULAR; INTRAVENOUS at 06:02

## 2018-04-30 RX ADMIN — LEVALBUTEROL HYDROCHLORIDE 1.25 MG: 1.25 SOLUTION, CONCENTRATE RESPIRATORY (INHALATION) at 07:18

## 2018-04-30 RX ADMIN — SODIUM CHLORIDE 3 G: 9 INJECTION, SOLUTION INTRAVENOUS at 12:15

## 2018-04-30 RX ADMIN — METOPROLOL TARTRATE 25 MG: 25 TABLET ORAL at 08:50

## 2018-04-30 RX ADMIN — FLUTICASONE PROPIONATE 1 SPRAY: 50 SPRAY, METERED NASAL at 08:50

## 2018-04-30 RX ADMIN — INSULIN HUMAN 50 UNITS: 500 INJECTION, SOLUTION SUBCUTANEOUS at 12:57

## 2018-04-30 RX ADMIN — INSULIN LISPRO 8 UNITS: 100 INJECTION, SOLUTION INTRAVENOUS; SUBCUTANEOUS at 08:50

## 2018-04-30 RX ADMIN — BUDESONIDE AND FORMOTEROL FUMARATE DIHYDRATE 2 PUFF: 160; 4.5 AEROSOL RESPIRATORY (INHALATION) at 08:49

## 2018-04-30 RX ADMIN — SODIUM CHLORIDE 3 G: 9 INJECTION, SOLUTION INTRAVENOUS at 02:03

## 2018-04-30 RX ADMIN — INSULIN LISPRO 12 UNITS: 100 INJECTION, SOLUTION INTRAVENOUS; SUBCUTANEOUS at 12:56

## 2018-04-30 RX ADMIN — ENOXAPARIN SODIUM 40 MG: 40 INJECTION SUBCUTANEOUS at 08:49

## 2018-04-30 RX ADMIN — Medication 1000 MG: at 08:49

## 2018-04-30 RX ADMIN — ASPIRIN 81 MG: 81 TABLET, COATED ORAL at 08:49

## 2018-04-30 RX ADMIN — FERROUS SULFATE TAB 325 MG (65 MG ELEMENTAL FE) 325 MG: 325 (65 FE) TAB at 08:49

## 2018-04-30 RX ADMIN — POTASSIUM CHLORIDE 20 MEQ: 1500 TABLET, EXTENDED RELEASE ORAL at 08:49

## 2018-04-30 RX ADMIN — INSULIN HUMAN 50 UNITS: 500 INJECTION, SOLUTION SUBCUTANEOUS at 09:16

## 2018-04-30 RX ADMIN — CYCLOBENZAPRINE HYDROCHLORIDE 10 MG: 10 TABLET, FILM COATED ORAL at 08:49

## 2018-04-30 RX ADMIN — FUROSEMIDE 40 MG: 40 TABLET ORAL at 08:49

## 2018-04-30 RX ADMIN — PANTOPRAZOLE SODIUM 40 MG: 40 TABLET, DELAYED RELEASE ORAL at 06:01

## 2018-04-30 NOTE — PROGRESS NOTES
Progress Note - Denise Holm Sr  1945, 81 y o  male MRN: [id number]    Unit/Bed#: [facility] 613-01 Encounter: [id number]    Primary Care Provider: Beverly Aragon MD   Date and time admitted to hospital: 4/27/2018  2:37 PM        * Acute on chronic respiratory failure with hypoxia and hypercapnia (HCC)   Assessment & Plan    Bilateral wheezing, active respiratory distress on clinical exam  Bronchodilator therapy as scheduled  Continue oxygen supplementation, at baseline 3 L nasal cannula, BiPAP 20/12 60% if clinical condition deteriorates  Continue Solu-Medrol 60 q 6h  Pulmonology consult  Peak flow daily  Continue antibiotics for now        Diabetic neuropathy ([facility] [address] )   Assessment & Plan    Chronic  Continue above management        Essential hypertension   Assessment & Plan    Continue blood pressure monitoring  BP elevated on admission  Continue meds          Chronic kidney disease, stage 3   Assessment & Plan    Creatinine 1 48, similar compared to baseline  Monitor BMP for BUN creatinine daily        COPD (chronic obstructive pulmonary disease) ([facility] [address] )   Assessment & Plan    Continue above management for acute on chronic respiratory failure        Venous stasis dermatitis of both lower extremities   Assessment & Plan    Redness, edema, oozing from multiple that wounds midtibial area  Podiatry consult  Started unasyn  Wound care consult  Monitor clinically  Dry dressings for now        Type 2 diabetes mellitus with complication (HCC)   Assessment & Plan    Continue home meds  Continue blood glucose monitoring  A1c 6 8% 6 months ago  Repeat A1c today  Carb controlled diet  Counseling provided on diet exercise and weight loss          Dyslipidemia   Assessment & Plan    Continue statin        Obesity (BMI 30-39  9)   Assessment & Plan    Counseling provided on diet exercise and weight loss  Continue above management            VTE Pharmacologic Prophylaxis:   Pharmacologic:  Lovenox  Mechanical VTE Prophylaxis in Place: No    Patient Centered Rounds: I have performed bedside rounds with nursing staff today  Time Spent for Care: 15 minutes  More than 50% of total time spent on counseling and coordination of care as described above  Current Length of Stay: 3 day(s)    Current Patient Status: Inpatient       Code Status: Prior      Subjective:   Patient comfortable    Objective:     Vitals:   Temp (24hrs), Av 9 °F (36 6 °C), Min:97 6 °F (36 4 °C), Max:98 4 °F (36 9 °C)    HR:  [84-92] 88  Resp:  [18-20] 20  BP: (144-166)/(64-77) 144/70  SpO2:  [94 %-97 %] 94 %  Body mass index is 37 08 kg/m²  Input and Output Summary (last 24 hours): Intake/Output Summary (Last 24 hours) at 18 0919  Last data filed at 18 0348   Gross per 24 hour   Intake             1400 ml   Output             2175 ml   Net             -775 ml       Physical Exam:     Physical Exam   Constitutional: He appears well-developed and well-nourished  HENT:   Head: Normocephalic and atraumatic  Eyes: Pupils are equal, round, and reactive to light  Neck: Neck supple  Cardiovascular: Normal rate and regular rhythm  No murmur heard  Pulmonary/Chest: He has wheezes  Abdominal: Soft  Bowel sounds are normal  He exhibits no distension  Musculoskeletal: Normal range of motion  Neurological: He is alert  Skin: Skin is warm and dry  Psychiatric: He has a normal mood and affect         Additional Data:     Labs:      Results from last 7 days  Lab Units 18  1026 18  1522   WBC Thousand/uL 10 19* 13 52*   HEMOGLOBIN g/dL 13 1 13 9   HEMATOCRIT % 40 5 45 6   PLATELETS Thousands/uL 166 177   NEUTROS PCT %  --  83*   LYMPHS PCT %  --  11*   LYMPHO PCT % 3*  --    MONOS PCT %  --  5   MONO PCT MAN % 2*  --    EOS PCT %  --  1   EOSINO PCT MANUAL % 0  --        Results from last 7 days  Lab Units 18  1026   SODIUM mmol/L 136   POTASSIUM mmol/L 5 0   CHLORIDE mmol/L 96*   CO2 mmol/L 34*   BUN mg/dL 22 CREATININE mg/dL 1 26   CALCIUM mg/dL 8 7   GLUCOSE RANDOM mg/dL 388*       Results from last 7 days  Lab Units 04/29/18  0603   INR  1 02       * I Have Reviewed All Lab Data Listed Above  * Additional Pertinent Lab Tests Reviewed:  The patient will continue to require additional inpatient hospital stay due to Need to monitor symptoms      Recent Cultures (last 7 days):           Last 24 Hours Medication List:     Current Facility-Administered Medications:  ampicillin-sulbactam 3 g Intravenous Q6H Sharita Torres MD Last Rate: 3 g (04/30/18 0850)   aspirin 81 mg Oral Daily Sharita Torres MD    budesonide 0 5 mg Nebulization Q12H Sharita Torres MD    budesonide-formoterol 2 puff Inhalation BID Sharita Torres MD    cyclobenzaprine 10 mg Oral BID Marcia Workman PA-C    enoxaparin 40 mg Subcutaneous Q24H [address] Harriet Rock DO    ferrous sulfate 325 mg Oral Daily With Breakfast Sharita Torres MD    fish oil 1,000 mg Oral BID Sharita Torres MD    fluticasone 1 spray Nasal Daily Sharita Torres MD    furosemide 40 mg Oral Daily Sharita Torres MD    insulin lispro 1-6 Units Subcutaneous HS Marcia Workman PA-C    insulin lispro 2-12 Units Subcutaneous TID AC Marcia Workman PA-C    insulin regular 30 Units Subcutaneous Before Dinner Depew-Bonifay, DO    insulin regular 50 Units Subcutaneous BID before breakfast/lunch Marisol Gerardo DO    levalbuterol 1 25 mg Nebulization TID Sharita Torres MD    levalbuterol 1 25 mg Nebulization Q6H PRN Sharita Torres MD    methylPREDNISolone sodium succinate 40 mg Intravenous Q8H [address] Harriet Rock DO    metoprolol tartrate 25 mg Oral BID Sharita Torres MD    oxyCODONE-acetaminophen 1 tablet Oral Q6H PRN Sharita Torres MD    pantoprazole 40 mg Oral BID AC Sharita Torres MD    potassium chloride 20 mEq Oral BID Sharita Torres MD    pravastatin 20 mg Oral Daily With Slick Pate MD    sodium chloride 3 mL Nebulization Q6H PRN Sharita Torres MD    sodium chloride 3 mL Nebulization TID John Beck MD         Today, Patient Was Seen By: Ce Fonseca DO    ** Please Note: Dictation voice to text software may have been used in the creation of this document   **

## 2018-04-30 NOTE — CASE MANAGEMENT
Notification of Discharge  This is a Notification of Discharge from our facility [address]  Please be advised that this patient has been discharge from our facility  Below you will find the admission and discharge date and time including the patients disposition  PRESENTATION DATE: 4/27/2018  2:37 PM  IP ADMISSION DATE: 4/27/18 1704  DISCHARGE DATE: 4/30/2018  2:35 PM  DISPOSITION: Home/Self Care    [address] [facility] in the [facility] by Onofre Carroll for 2017  Network Utilization Review Department  Phone: [phone]; Fax [phone]  ATTENTION: The Network Utilization Review Department is now centralized for our 7 Facilities  Make a note that we have a new phone and fax numbers for our Department  Please call with any questions or concerns to [phone] and carefully follow the prompts so that you are directed to the right person  All voicemails are confidential  Fax any determinations, approvals, denials, and requests for initial or continue stay review clinical to [phone]  Due to HIGH CALL volume, it would be easier if you could please send faxed requests to expedite your requests and in part, help us provide discharge notifications faster

## 2018-04-30 NOTE — CASE MANAGEMENT
Miguel Angel Avendaño RN Registered Nurse Signed   Case Management Date of Service: 4/28/2018  5:59 PM         []Hide copied text  Initial Clinical Review     Thank you,  [address] [facility] in the [facility] by Onofre Carroll for 2017  Network Utilization Review Department  Phone: [phone]; Fax [phone]  ATTENTION: The Network Utilization Review Department is now centralized for our 7 Facilities  Make a note that we have a new phone and fax numbers for our Department  Please call with any questions or concerns to [phone] and carefully follow the prompts so that you are directed to the right person  All voicemails are confidential  Fax any determinations, approvals, denials, and requests for initial or continue stay review clinical to [phone]  Due to HIGH CALL volume, it would be easier if you could please send faxed requests to expedite your requests and in part, help us provide discharge notifications faster         Admission: Date/Time/Statement: 4/27/18 @ 1704            Orders Placed This Encounter   Procedures    Inpatient Admission (expected length of stay for this patient is greater than two midnights)       Standing Status:   Standing       Number of Occurrences:   1       Order Specific Question:   Admitting Physician       Answer:   Ishaan Dorantes [[id number]]       Order Specific Question:   Level of Care       Answer:   Med Surg [16]       Order Specific Question:   Estimated length of stay       Answer:   More than 2 Midnights       Order Specific Question:   Certification       Answer:   I certify that inpatient services are medically necessary for this patient for a duration of greater than two midnights   See H&P and MD Progress Notes for additional information about the patient's course of treatment             ED: Date/Time/Mode of Arrival:             ED Arrival Information      Expected Arrival Acuity Means of Arrival Escorted By Service Admission Type     - 4/27/2018 14:19 Emergent Wheelchair Family Member General Medicine Emergency     Arrival Complaint     medical problem             Chief Complaint:        Chief Complaint   Patient presents with    Shortness of Breath       SOB since this morning, home 02 @ 2 liters         History of Illness: 81 y  o  male  with past medical history of grade 1 diastolic dysfunction CHF,  COPD, history of pleural effusion, thoracocentesis on prior admission to 02/2018, chronic hypoxic respiratory failure on nasal cannula, allergic to Neurontin, diabetes mellitus, chronic venous stasis dermatitis of bilateral lower extremities, dyslipidemia, morbid obesity, CKD stage 3 was brought to the ER with complaints of shortness of breath which started last night, worsening  nonproductive cough (feels like phelgm is in his throat),  denied any phlegm expectorated, fever, chills, nausea, vomiting, change in bowel urinary habits, recent ill contacts  Alexandra Joaquim reported bilateral lower extremity edema with lesions present on left lower extremity with bruising and erythema      Exam:  Constitutional: He is oriented to person, place, and time  He appears distressed  Morbidly obese man on 3 L nasal cannula   Pulmonary/Chest: He is in respiratory distress  He has wheezes  He has no rales  He exhibits no tenderness  Musculoskeletal: Normal range of motion  He exhibits edema and tenderness  He exhibits no deformity     Bilateral lower extremity edema, with multiple circumferential rashes anterior tibial area with redness       ED Vital Signs:            ED Triage Vitals   Temperature Pulse Respirations Blood Pressure SpO2   04/27/18 1435 04/27/18 1435 04/27/18 1435 04/27/18 1435 04/27/18 1435   98 4 °F (36 9 °C) 103 (!) 30 140/80 94 %       Temp Source Heart Rate Source Patient Position - Orthostatic VS BP Location FiO2 (%)   04/27/18 2057 04/27/18 2057 04/27/18 2330 04/27/18 2057 --   Oral Monitor Lying Right arm         Pain Score           04/27/18 1435           Worst Possible Pain                Wt Readings from Last 1 Encounters:   04/27/18 124 kg (273 lb 5 9 oz)         Vital Signs:  04/27 0701  04/28 0700 04/28 0701  04/28 1816   Most Recent       Temperature (°F) 9898 4 97 898   98 (36 7)     Pulse 99111 8088   88     Respirations 2230 20   20     Blood Pressure 140/80178/80 147/67153/69   147/67     SpO2 (%) 9497 9496   96           Abnormal Labs/Diagnostic Test Results:     Blood gas, venous        Ref Range & Units 4/27/18 1522 Flag   pH, Dwain 7 300 - 7 400 7  328      pCO2, Dwain 42 0 - 50 0 mm Hg 71 8   H    pO2, Dwain 35 0 - 45 0 mm Hg 76 4   H    HCO3, Dwain 24 - 30 mmol/L 36 8   H    Base Excess, Dwain mmol/L 7 9      O2 Content, Dwain ml/dL 18 5      O2 HGB, VENOUS 60 0 - 80 0 % 89 4   H                Lab Units 04/27/18  1522   WBC Thousand/uL 13 52*   HEMOGLOBIN g/dL 13 9   HEMATOCRIT % 45 6   PLATELETS Thousands/uL 177   NEUTROS PCT % 83*   LYMPHS PCT % 11*   MONOS PCT % 5   EOS PCT % 1      Lab Units 04/27/18  1522   SODIUM mmol/L 137   POTASSIUM mmol/L 4 6   CHLORIDE mmol/L 98*   CO2 mmol/L 34*   BUN mg/dL 16   CREATININE mg/dL 1 48*   CALCIUM mg/dL 8 3   GLUCOSE RANDOM mg/dL 325*      CXR -- Stable cardiomegaly and right pleural effusion     EKG -- Sinus tachycardia with occasional Premature ventricular complexes  Right bundle branch block  Possible Lateral infarct , age undetermined  Possible Inferior infarct , age undetermined        ED Treatment:              Medication Administration from 04/27/2018 1419 to 04/27/2018 2001        Date/Time Order Dose Route Action Action by Comments       04/27/2018 1458 albuterol inhalation solution 10 mg 10 mg Nebulization Given Maybelle Levo, RT         04/27/2018 1458 ipratropium (ATROVENT) 0 02 % inhalation solution 1 mg 1 mg Nebulization Given Maybelle Levo, RT         04/27/2018 1458 sodium chloride 0 9 % inhalation solution 3 mL 3 mL Nebulization Given Simon Le, RT       04/27/2018 1543 methylPREDNISolone sodium succinate (Solu-MEDROL) injection 125 mg 125 mg Intravenous Given Rhonda Cohen RN         04/27/2018 1543 azithromycin (ZITHROMAX) 500 mg in sodium chloride 0 9% 250mL IVPB 500 mg 500 mg Intravenous New Bag Rhonda Cohen RN         04/27/2018 1806 albuterol inhalation solution 5 mg 5 mg Nebulization Given Rhonda Cohen RN         04/27/2018 1908 methylPREDNISolone sodium succinate (Solu-MEDROL) injection 60 mg 60 mg Intravenous Not Given Rhonda Cohen RN received at 0  dr Deborah Mike aware             Past Medical/Surgical History:        Past Medical History:   Diagnosis Date    Abdominal pain      Cardiac disease      CHF [facility]congestive heart failure) ([facility] )      COPD (chronic obstructive pulmonary disease) ([place] [address] )      Coronary artery disease      Diabetes mellitus ([place] [address] )      Hyperlipidemia      Hypertension      MI (myocardial infarction) ([place] [address] )      Prostate cancer ([place] [address] )           Admitting Diagnosis: COPD exacerbation (HCC) [J44 1]  Cellulitis of left lower extremity [L03 116]  History of medical problems [Z87 898]     Age/Sex: 81 y o  male     Assessment/Plan:        Acute on chronic respiratory failure with hypoxia and hypercapnia (HCC)   Assessment & Plan     Bilateral wheezing, active respiratory distress on clinical exam  Chest x-ray with stable cardiomegaly and right pleural effusion for 04/27/2018  Blood gas on exam hypoxia with CO2 retention  Leukocytosis 13 52  ProBNP 523  Troponin less than 0 02  EKG  Sinus tachycardia with occasional Premature ventricular complexes  Right bundle branch block, Possible Lateral infarct , age undetermined, Possible Inferior infarct , age undetermined  Confirmed by LOLITA SIM, MARIE ([id number]) on 4/27/2018 3:36:28 PM  Bronchodilator therapy as scheduled  Continue oxygen supplementation, at baseline 3 L nasal cannula, BiPAP 20/12 60% if clinical condition deteriorates  Continue Solu-Medrol 60 q 6h  Pulmonology consult  Peak flow daily  Continue antibiotics for now          Diabetic neuropathy ([facility])   Assessment & Plan     Chronic  Continue above management          Essential hypertension   Assessment & Plan     Continue blood pressure monitoring  BP elevated on admission  Continue meds             Chronic kidney disease, stage 3   Assessment & Plan     Creatinine 1 48, similar compared to baseline  Monitor BMP for BUN creatinine daily          COPD (chronic obstructive pulmonary disease) ([facility])   Assessment & Plan     Continue above management for acute on chronic respiratory failure          Venous stasis dermatitis of both lower extremities   Assessment & Plan     Redness, edema, oozing from multiple that wounds midtibial area of left leg  Podiatry consult  Started unasyn, for acute cellulitis  Wound care consult  Monitor clinically  Dry dressings for now          Type 2 diabetes mellitus with complication ([facility])   Assessment & Plan     Continue home meds  Continue blood glucose monitoring  A1c 6 8% 6 months ago  Repeat A1c today  Carb controlled diet  Counseling provided on diet exercise and weight loss             Dyslipidemia   Assessment & Plan     Continue statin          Obesity (BMI 30-39  9)   Assessment & Plan     Counseling provided on diet exercise and weight loss  Continue above management                 VTE Prophylaxis: Warfarin (Coumadin)  / sequential compression device   Code Status: full code  POLST: POLST form is not discussed and not completed at this time    Discussion with family: no     Anticipated Length of Stay: Eddi Pink will be admitted on an Inpatient basis with an anticipated length of stay of >2 midnights    Justification for Hospital Stay:  Medical management of COPD exacerbation        Admission Orders:  M/S/Tele unit  Telem  O2 3 lpm nc  Bipap as needed 20/12, FiO2 60%  Monitor I&O's  accuchecks qid w/ ssi  Cons carb diet  Consult pulmonology, podiatry, wound care     Scheduled Meds: Current Facility-Administered Medications:  ampicillin-sulbactam 3 g Intravenous Q6H Zohaib Watkins MD Last Rate: 3 g (04/28/18 1336)   aspirin 81 mg Oral Daily Zohaib Watkins MD     budesonide 0 5 mg Nebulization Q12H Zohaib Watkins MD     budesonide-formoterol 2 puff Inhalation BID Zohaib Watkins MD     cyclobenzaprine 10 mg Oral HS Zohaib Watkins MD     enoxaparin 40 mg Subcutaneous Q24H [facility] Harriet Rock DO     ferrous sulfate 325 mg Oral Daily With Melissa Barber MD     fish oil 1,000 mg Oral BID Zohaib Watkins MD     fluticasone 1 spray Nasal Daily Zohaib Watkins MD     furosemide 40 mg Oral Daily Zohaib Watkins MD     insulin lispro 1-6 Units Subcutaneous HS Marcia Workman PA-C     insulin lispro 2-12 Units Subcutaneous TID AC Marcia Workman PA-C     insulin regular 30 Units Subcutaneous Before Dinner Nichole Morrison DO     insulin regular 50 Units Subcutaneous BID before breakfast/lunch Marisol Gerardo DO     levalbuterol 1 25 mg Nebulization TID Zohaib Watkins MD     levalbuterol 1 25 mg Nebulization Q6H PRN Zohaib Watkins MD     methylPREDNISolone sodium succinate 40 mg Intravenous Q8H [facility] Harriet Rock DO     metoprolol tartrate 25 mg Oral BID Zohaib Watkins MD     oxyCODONE-acetaminophen 1 tablet Oral Q6H PRN Zohaib Watkins MD     pantoprazole 40 mg Oral BID AC Zohaib Watkins MD     potassium chloride 20 mEq Oral BID Zohaib Watkins MD     pravastatin 20 mg Oral Daily With Shelley Diggs MD     sodium chloride 3 mL Nebulization Q6H PRN Zohaib Watkins MD     sodium chloride 3 mL Nebulization TID Zohaib Watkins MD        Continuous Infusions:    PRN Meds: levalbuterol    oxyCODONE-acetaminophen    sodium chloride

## 2018-04-30 NOTE — ASSESSMENT & PLAN NOTE
Redness, edema, oozing from multiple that wounds midtibial area  Podiatry consult  Started unasyn-will convert to Augmentin  Wound care consult  Monitor clinically    Dry dressings for now

## 2018-04-30 NOTE — ASSESSMENT & PLAN NOTE
Continue blood pressure monitoring  BP elevated on admission  Continue meds  Patient declining Lasix  Will need follow-up regarding outpatient volume status  Patient reports that he has had side effects associated with prostate  I did encourage him to follow up with Urology

## 2018-04-30 NOTE — ASSESSMENT & PLAN NOTE
Bilateral wheezing, active respiratory distress on clinical exam  Bronchodilator therapy as scheduled  Continue oxygen supplementation, at baseline 3 L nasal cannula, BiPAP 20/12 60% if clinical condition deteriorates  Continue prednisone 40 daily with taper  Pulmonology consult  Peak flow daily  Continue antibiotics for now

## 2018-04-30 NOTE — DISCHARGE INSTRUCTIONS
Washout both lower extremities with soap and water in AM, moisturize with skin nourishing cream, apply Lg  Apply gio stocking to both legs and remove at HS  2-Elevate legs for pressure offloading and edema management  3-please turn and reposition yourself regularly  4-Moisturize skin daily

## 2018-04-30 NOTE — ASSESSMENT & PLAN NOTE
Continue home meds  Continue blood glucose monitoring  A1c 6 8% 6 months ago  Repeat A1c today  Carb controlled diet  Counseling provided on diet exercise and weight loss  Continue with home insulin regimen  Rapid taper off steroids

## 2018-04-30 NOTE — CONSULTS
Consult Note - Wound   Nikki Quintero Sr  81 y o  male MRN: [id number]  Unit/Bed#: [facility] 613-01 Encounter: [id number]      Assessment:   Patient is 81year old male admitted with c/o SOS, LE edema and admitted with acute on chronic CHF with wounds to LLE  He was seen this morning by wound care and noted multiple stably scabbed partial thickness wounds scattered along anterior aspect of lower extremities  No erythema, drainage or signs of infection noted, legs are currently non edematous and back to base line per patient report  Legs were measured and compressions stocking applied to both, no other wound or skin issues present  Patient is AOx3 with no mobility deficit       Plan:   1-Wash b/l LE with soap and water in AM, moisturize with skin nourishing cream, apply Lg  Reg gio stocking to both legs and remove at HS  2-Elevate legs for pressure offloading and edema management  3-Encourage patient to turn/reposition self  4-Moisturize skin daily  Vitals: Blood pressure 144/70, pulse 88, temperature 97 8 °F (36 6 °C), temperature source Oral, resp  rate 20, height 6' (1 829 m), weight 124 kg (273 lb 5 9 oz), SpO2 94 %  ,Body mass index is 37 08 kg/m²  Wound care is signing off, please call ext [phone] or [phone] with questions or concerns      Artem Mcneal RN, BSN, [facility]

## 2018-04-30 NOTE — CASE MANAGEMENT
Continued Stay Review    Date: 18      Vital Signs: Temp (24hrs), Av °F (36 7 °C), Min:97 8 °F (36 6 °C), Max:98 4 °F (36 9 °C)     HR:  [80-91] 88  Resp:  [18-20] 20  BP: (124-152)/(60-94) 144/94  SpO2:  [93 %-97 %] 95 %  Body mass index is 37 08 kg/m²           Medications:     Current Facility-Administered Medications:  ampicillin-sulbactam 3 g Intravenous Q6H   aspirin 81 mg Oral Daily   budesonide 0 5 mg Nebulization Q12H   budesonide-formoterol 2 puff Inhalation BID   cyclobenzaprine 10 mg Oral BID   enoxaparin 40 mg Subcutaneous Q24H DRAKE   ferrous sulfate 325 mg Oral Daily With Breakfast   fish oil 1,000 mg Oral BID   fluticasone 1 spray Nasal Daily   furosemide 40 mg Oral Daily   insulin lispro 1-6 Units Subcutaneous HS   insulin lispro 2-12 Units Subcutaneous TID AC   insulin regular 30 Units Subcutaneous Before Dinner   insulin regular 50 Units Subcutaneous BID before breakfast/lunch   levalbuterol 1 25 mg Nebulization TID   levalbuterol 1 25 mg Nebulization Q6H PRN   methylPREDNISolone sodium succinate 40 mg Intravenous Q8H [facility]   metoprolol tartrate 25 mg Oral BID   oxyCODONE-acetaminophen 1 tablet Oral Q6H PRN   pantoprazole 40 mg Oral BID AC   potassium chloride 20 mEq Oral BID   pravastatin 20 mg Oral Daily With Dinner   sodium chloride 3 mL Nebulization Q6H PRN   sodium chloride 3 mL Nebulization TID      Abnormal Labs/Diagnostic Results:     Lab Units 18  1026 18  1522   WBC Thousand/uL 10 19* 13 52*   NEUTROS PCT %  --  83*   LYMPHS PCT %  --  11*     Lab Units 18  1026   CHLORIDE mmol/L 96*   CO2 mmol/L 34*   GLUCOSE RANDOM mg/dL 388*         Age/Sex: 81 y o  male     Assessment/Plan:       INTERNAL MEDICINE     Acute on chronic respiratory failure with hypoxia and hypercapnia (HCC)   Assessment & Plan     Bilateral wheezing, active respiratory distress on clinical exam  Bronchodilator therapy as scheduled  Continue oxygen supplementation, at baseline 3 L nasal cannula, BiPAP 20/12 60% if clinical condition deteriorates  Continue Solu-Medrol 60 q 6h  Pulmonology consult  Peak flow daily  Continue antibiotics for now          Diabetic neuropathy (HCC)   Assessment & Plan     Chronic  Continue above management          Essential hypertension   Assessment & Plan     Continue blood pressure monitoring  BP elevated on admission  Continue meds             Chronic kidney disease, stage 3   Assessment & Plan     Creatinine 1 48, similar compared to baseline  Monitor BMP for BUN creatinine daily          COPD (chronic obstructive pulmonary disease) (HCC)   Assessment & Plan     Continue above management for acute on chronic respiratory failure          Venous stasis dermatitis of both lower extremities   Assessment & Plan     Redness, edema, oozing from multiple that wounds midtibial area  Podiatry consult  Started unasyn  Wound care consult  Monitor clinically  Dry dressings for now          Type 2 diabetes mellitus with complication ([facility])   Assessment & Plan     Continue home meds  Continue blood glucose monitoring  A1c 6 8% 6 months ago  Repeat A1c today  Carb controlled diet  Counseling provided on diet exercise and weight loss             Dyslipidemia   Assessment & Plan     Continue statin          Obesity (BMI 30-39  9)   Assessment & Plan     Counseling provided on diet exercise and weight loss  Continue above management              Current Patient Status: Inpatient   Certification Statement: The patient will continue to require additional inpatient hospital stay due to Need to monitor symptoms     Discharge Plan:  Not ready for discharge    PULMONARY       Assessment:  Severe COPD with FEV1 29% predicted at 1 02 L  Acute on chronic diastolic heart failure  Cellulitis  Diabetes with hyperglycemia     Plan:  Wean steroids today to 40 mg b i d  anticipation of transition to p o  lino Rivero   Defer to primary team but IV insulin regimen does not appear to be working to keep blood sugars under control may consider insulin drip this point  Continue Unasyn likely transition to p o  antibiotics once blood cultures are negative  Patient would like to be discharged and is adamantly refusing BiPAP and any water pills on discharge    Will likely have difficulty controlling his venous stasis and right heart failure without the addition of Lasix and nocturnal ventilation to maintain on oxygen to keep pulse ox greater than 88%           Discharge Plan:   [id number]  [address]

## 2018-04-30 NOTE — RESTORATIVE TECHNICIAN NOTE
Restorative Specialist Mobility Note       Activity: Ambulate in gomez, Ambulate in room, Bathroom privileges, Chair, Dangle, Stand at bedside (Educated/encouraged pt to ambulate with assistance 3-4 x's/day   Pt callbell, phone/tray within reach )     Assistive Device: None             Aleks PINEDA, Restorative Technician, [facility]

## 2018-04-30 NOTE — SOCIAL WORK
Dr Martina Lara requested  to set up [facility] for SN  S/w pt whom is agreeable to referral to [facility]  Referral sent

## 2018-04-30 NOTE — DISCHARGE SUMMARY
Discharge- Esther Bridges   1945, 81 y o  male MRN: [id number]    Unit/Bed#: [facility] 613-01 Encounter: [id number]    Primary Care Provider: Joyce Benz MD   Date and time admitted to hospital: 4/27/2018  2:37 PM        * Acute on chronic respiratory failure with hypoxia and hypercapnia (HCC)   Assessment & Plan    Bilateral wheezing, active respiratory distress on clinical exam  Bronchodilator therapy as scheduled  Continue oxygen supplementation, at baseline 3 L nasal cannula, BiPAP 20/12 60% if clinical condition deteriorates  Continue prednisone 40 daily with taper  Pulmonology consult  Peak flow daily  Continue antibiotics for now        Diabetic neuropathy [facility])   Assessment & Plan    Chronic  Continue above management        Essential hypertension   Assessment & Plan    Continue blood pressure monitoring  BP elevated on admission  Continue meds  Patient declining Lasix  Will need follow-up regarding outpatient volume status  Patient reports that he has had side effects associated with prostate  I did encourage him to follow up with Urology  Chronic kidney disease, stage 3   Assessment & Plan    Creatinine 1 48, similar compared to baseline  Monitor BMP for BUN creatinine daily        COPD (chronic obstructive pulmonary disease) ([facility])   Assessment & Plan    Continue above management for acute on chronic respiratory failure        Venous stasis dermatitis of both lower extremities   Assessment & Plan    Redness, edema, oozing from multiple that wounds midtibial area  Podiatry consult  Started unasyn-will convert to Augmentin  Wound care consult  Monitor clinically  Dry dressings for now        Type 2 diabetes mellitus with complication [facility])   Assessment & Plan    Continue home meds  Continue blood glucose monitoring  A1c 6 8% 6 months ago  Repeat A1c today  Carb controlled diet  Counseling provided on diet exercise and weight loss  Continue with home insulin regimen    Rapid taper off steroids  Dyslipidemia   Assessment & Plan    Continue statin        Obesity (BMI 30-39  9)   Assessment & Plan    Counseling provided on diet exercise and weight loss  Continue above management        Patient is 81year old male admitted with c/o SOS, LE edema and admitted with acute on chronic CHF with wounds to lower extremity on left side  No erythema, drainage or signs of infection noted, legs are currently non edematous and back to base line per patient report  Will complete course of antibiotics started in the ED        Plan as per Wound Care   1-Wash b/l LE with soap and water in AM, moisturize with skin nourishing cream, apply Lg  Reg gio stocking to both legs and remove at HS  2-Elevate legs for pressure offloading and edema management  3-Encourage patient to turn/reposition self  4-Moisturize skin daily  Resolved Problems  Date Reviewed: 4/27/2018    None          Admission Date:   Admission Orders     Ordered        04/27/18 1704  Inpatient Admission (expected length of stay for this patient is greater than two midnights)  Once               Admitting Diagnosis: COPD exacerbation ([facility] [address] ) [J44 1]  Cellulitis of left lower extremity [L03 116]  History of medical problems [Z87 898]        Procedures Performed: No orders of the defined types were placed in this encounter  Summary of Hospital Course: This is a 22-year-old male with past medical history grade 1 diastolic dysfunction heart failure COPD and history of pleural effusion who presents to the hospital with increased work of breathing  The patient has chronic respiratory problems and is on nasal cannula at home  The patient presents to the hospital for further evaluation  He was admitted with acute on chronic respiratory failure with associated hypoxia and hypercapnia on initial evaluation  The patient reports that he is not taking his Lasix regularly due to side effects associated with diuresis    He presented with a reported respiratory distress secondary to multifactorial etiology including COPD and volume overload  The patient was given oral diuretics here  The patient was also given steroids  He was followed by Pulmonary  After further discussion with the Pulmonary service and the patient he wishes to return home  Patient reports that his wife is and hospice and he wished to optimize time with her  At this particular juncture the patient is to be discharged with plans for close outpatient follow-up with Pulmonary service and also with his PCP  His presentation was also complicated by lower extremity wounds  This appears to be dried and scabbed over with no active infection currently  Condition at Discharge: fair         Discharge instructions/Information to patient and family:   See after visit summary for information provided to patient and family  Provisions for Follow-Up Care:  See after visit summary for information related to follow-up care and any pertinent home health orders  PCP: Kate Holt MD    Disposition: Home    Planned Readmission: No    Discharge Statement   I spent 35  minutes discharging the patient  This time was spent on the day of discharge  I had direct contact with the patient on the day of discharge  Additional documentation is required if more than 30 minutes were spent on discharge  Discharge Medications:  See after visit summary for reconciled discharge medications provided to patient and family

## 2018-04-30 NOTE — PROGRESS NOTES
Progress Note - Pulmonary   Gilmer Brighter Sr  81 y o  male MRN: [id number]  Unit/Bed#: [facility] 613-01 Encounter: [id number]      Assessment/Plan:  1  Severe COPD with acute exacerbation        *  Decrease solumedrol and transition to oral steroids over next 24hrs  Taper prednisone by 10mg every 3 days        *  Restart Spiriva        *  D/C pulmicort, continue symbicort        *  Continue Xopenex        *  At D/C should be on:  Spiriva Respimat, Symbicort, Albuterol Nebs TID  2  Chronic hypoxic & hypercapnic respiratory failure        *  At baseline 2L of oxygen around the clock        *  Adamantly refusing BiPAP        *  Keep oxygen saturations greater than or equal to 88%        *  Incentive spirometry Q1hr, OOB as able, increase activity as able  3  Acute on chronic diastolic HF        *  Refusing lasix        *  Pt is aware that it will be difficult to control his right sided heart failure without addition of lasix as well as BiPAP &            this will likely mean readmission to hospital    ~Discussed with Dr Jenna Man  ~Outpatient pulmonary follow up as per discharge instructions    Subjective:   Mr Zach Andrew is seen sitting on the edge of his bed doing cross word puzzle this morning  He is feeling much better and is hoping to be able to go home soon  His LE edema is much improved, as is his breathing  He denies chest pain, resting shortness of breath, fever or bronchospasm  He does have some cough, but isn't bringing up much sputum  Objective:     Vitals: Blood pressure 144/70, pulse 88, temperature 97 8 °F (36 6 °C), temperature source Oral, resp  rate 20, height 6' (1 829 m), weight 124 kg (273 lb 5 9 oz), SpO2 94 %  , 2LNC, Body mass index is 37 08 kg/m²        Intake/Output Summary (Last 24 hours) at 04/30/18 1003  Last data filed at 04/30/18 0952   Gross per 24 hour   Intake             1160 ml   Output             2525 ml   Net            -1365 ml         Physical Exam  Gen: Awake, alert, oriented x 3, no acute distress  HEENT: Mucous membranes moist, no oral lesions, no thrush, wearing O2 via NC  NECK: No accessory muscle use, JVP not elevated  Neck is thick and stoudt  Cardiac: Regular, single S1, single S2, no murmurs, no rubs, no gallops  Lungs: Decreased breath sounds throughout bilaterally without wheezes, rhonchi or rales noted  Abdomen: Obese, normoactive bowel sounds, soft nontender, nondistended, no rebound or rigidity, no guarding  Extremities: no cyanosis, no clubbing  LE wrapped bilaterally  Trace edema bilaterally    Labs: I have personally reviewed pertinent lab results  , ABG: No results found for: PHART, HZF6ZXG, PO2ART, OYQ8TCK, U5NMBHSF, BEART, SOURCE, BNP: No results found for: BNP, CBC: No results found for: WBC, HGB, HCT, MCV, PLT, ADJUSTEDWBC, MCH, MCHC, RDW, MPV, NRBC, CMP: No results found for: NA, K, CL, CO2, ANIONGAP, BUN, CREATININE, GLUCOSE, CALCIUM, AST, ALT, ALKPHOS, PROT, ALBUMIN, BILITOT, EGFR, PT/INR: No results found for: PT, INR, Troponin: No results found for: TROPONINI     Imaging and other studies: I have personally reviewed pertinent films in PACS    PA & Lat CXR 4/27/18  FINDINGS:     Stable cardiomegaly      Stable small chronic right effusion  No left-sided effusion  No visible infiltrates    No pneumothorax      Osseous structures appear within normal limits for patient age      IMPRESSION:     Stable cardiomegaly and right pleural effusion    Jareth Mnuson PA-C

## 2018-05-02 ENCOUNTER — TELEPHONE (OUTPATIENT)
Dept: RESPIRATORY THERAPY | Facility: HOSPITAL | Age: 73
End: 2018-05-02

## 2018-05-07 PROBLEM — I50.32 CHRONIC DIASTOLIC HEART FAILURE (HCC): Status: ACTIVE | Noted: 2018-02-01

## 2018-05-23 ENCOUNTER — APPOINTMENT (INPATIENT)
Dept: NON INVASIVE DIAGNOSTICS | Facility: HOSPITAL | Age: 73
DRG: 280 | End: 2018-05-23
Payer: COMMERCIAL

## 2018-05-23 ENCOUNTER — APPOINTMENT (EMERGENCY)
Dept: RADIOLOGY | Facility: HOSPITAL | Age: 73
DRG: 280 | End: 2018-05-23
Payer: COMMERCIAL

## 2018-05-23 ENCOUNTER — HOSPITAL ENCOUNTER (INPATIENT)
Facility: HOSPITAL | Age: 73
LOS: 3 days | Discharge: HOME HEALTH | DRG: 280 | End: 2018-05-26
Attending: EMERGENCY MEDICINE | Admitting: INTERNAL MEDICINE
Payer: COMMERCIAL

## 2018-05-23 DIAGNOSIS — R35.0 URINARY FREQUENCY: ICD-10-CM

## 2018-05-23 DIAGNOSIS — R06.02 SOB (SHORTNESS OF BREATH): ICD-10-CM

## 2018-05-23 DIAGNOSIS — I21.A1 TYPE 2 MYOCARDIAL INFARCTION (HCC): ICD-10-CM

## 2018-05-23 DIAGNOSIS — I50.33 ACUTE ON CHRONIC DIASTOLIC (CONGESTIVE) HEART FAILURE (HCC): ICD-10-CM

## 2018-05-23 DIAGNOSIS — J44.1 COPD EXACERBATION (HCC): ICD-10-CM

## 2018-05-23 DIAGNOSIS — R33.9 URINARY RETENTION: ICD-10-CM

## 2018-05-23 DIAGNOSIS — R06.02 SHORTNESS OF BREATH: Primary | ICD-10-CM

## 2018-05-23 PROBLEM — IMO0001 INSULIN DEPENDENT DIABETES MELLITUS: Status: ACTIVE | Noted: 2018-05-23

## 2018-05-23 PROBLEM — R79.89 ELEVATED D-DIMER: Status: ACTIVE | Noted: 2018-05-23

## 2018-05-23 PROBLEM — I25.10 CAD (CORONARY ARTERY DISEASE): Status: ACTIVE | Noted: 2018-05-23

## 2018-05-23 LAB
ALBUMIN SERPL BCP-MCNC: 2.9 G/DL (ref 3.5–5)
ALP SERPL-CCNC: 79 U/L (ref 46–116)
ALT SERPL W P-5'-P-CCNC: 39 U/L (ref 12–78)
ANION GAP SERPL CALCULATED.3IONS-SCNC: 4 MMOL/L (ref 4–13)
AST SERPL W P-5'-P-CCNC: 43 U/L (ref 5–45)
ATRIAL RATE: 105 BPM
BASOPHILS # BLD AUTO: 0.02 THOUSANDS/ΜL (ref 0–0.1)
BASOPHILS NFR BLD AUTO: 0 % (ref 0–1)
BILIRUB SERPL-MCNC: 0.36 MG/DL (ref 0.2–1)
BUN SERPL-MCNC: 22 MG/DL (ref 5–25)
CALCIUM SERPL-MCNC: 8.6 MG/DL (ref 8.3–10.1)
CHLORIDE SERPL-SCNC: 97 MMOL/L (ref 100–108)
CO2 SERPL-SCNC: 37 MMOL/L (ref 21–32)
CREAT SERPL-MCNC: 1.24 MG/DL (ref 0.6–1.3)
DEPRECATED D DIMER PPP: 522 NG/ML (FEU) (ref 0–424)
EOSINOPHIL # BLD AUTO: 0.06 THOUSAND/ΜL (ref 0–0.61)
EOSINOPHIL NFR BLD AUTO: 1 % (ref 0–6)
ERYTHROCYTE [DISTWIDTH] IN BLOOD BY AUTOMATED COUNT: 15.4 % (ref 11.6–15.1)
GFR SERPL CREATININE-BSD FRML MDRD: 59 ML/MIN/1.73SQ M
GLUCOSE SERPL-MCNC: 154 MG/DL (ref 65–140)
GLUCOSE SERPL-MCNC: 44 MG/DL (ref 65–140)
GLUCOSE SERPL-MCNC: 478 MG/DL (ref 65–140)
GLUCOSE SERPL-MCNC: 73 MG/DL (ref 65–140)
HCT VFR BLD AUTO: 42.6 % (ref 36.5–49.3)
HGB BLD-MCNC: 12.9 G/DL (ref 12–17)
LYMPHOCYTES # BLD AUTO: 0.79 THOUSANDS/ΜL (ref 0.6–4.47)
LYMPHOCYTES NFR BLD AUTO: 7 % (ref 14–44)
MCH RBC QN AUTO: 27.2 PG (ref 26.8–34.3)
MCHC RBC AUTO-ENTMCNC: 30.3 G/DL (ref 31.4–37.4)
MCV RBC AUTO: 90 FL (ref 82–98)
MONOCYTES # BLD AUTO: 0.58 THOUSAND/ΜL (ref 0.17–1.22)
MONOCYTES NFR BLD AUTO: 5 % (ref 4–12)
NEUTROPHILS # BLD AUTO: 9.37 THOUSANDS/ΜL (ref 1.85–7.62)
NEUTS SEG NFR BLD AUTO: 86 % (ref 43–75)
NRBC BLD AUTO-RTO: 0 /100 WBCS
NT-PROBNP SERPL-MCNC: 904 PG/ML
P AXIS: 92 DEGREES
PLATELET # BLD AUTO: 111 THOUSANDS/UL (ref 149–390)
PLATELET # BLD AUTO: 117 THOUSANDS/UL (ref 149–390)
PMV BLD AUTO: 10 FL (ref 8.9–12.7)
PMV BLD AUTO: 10.4 FL (ref 8.9–12.7)
POTASSIUM SERPL-SCNC: 4.6 MMOL/L (ref 3.5–5.3)
PR INTERVAL: 152 MS
PROT SERPL-MCNC: 6.7 G/DL (ref 6.4–8.2)
QRS AXIS: 92 DEGREES
QRSD INTERVAL: 124 MS
QT INTERVAL: 368 MS
QTC INTERVAL: 483 MS
RBC # BLD AUTO: 4.74 MILLION/UL (ref 3.88–5.62)
SODIUM SERPL-SCNC: 138 MMOL/L (ref 136–145)
T WAVE AXIS: 43 DEGREES
TROPONIN I SERPL-MCNC: 0.06 NG/ML
VENTRICULAR RATE: 104 BPM
WBC # BLD AUTO: 10.9 THOUSAND/UL (ref 4.31–10.16)

## 2018-05-23 PROCEDURE — 96374 THER/PROPH/DIAG INJ IV PUSH: CPT

## 2018-05-23 PROCEDURE — 94760 N-INVAS EAR/PLS OXIMETRY 1: CPT

## 2018-05-23 PROCEDURE — 80053 COMPREHEN METABOLIC PANEL: CPT | Performed by: EMERGENCY MEDICINE

## 2018-05-23 PROCEDURE — 93005 ELECTROCARDIOGRAM TRACING: CPT

## 2018-05-23 PROCEDURE — 85025 COMPLETE CBC W/AUTO DIFF WBC: CPT

## 2018-05-23 PROCEDURE — 99285 EMERGENCY DEPT VISIT HI MDM: CPT

## 2018-05-23 PROCEDURE — 85049 AUTOMATED PLATELET COUNT: CPT | Performed by: INTERNAL MEDICINE

## 2018-05-23 PROCEDURE — 82948 REAGENT STRIP/BLOOD GLUCOSE: CPT

## 2018-05-23 PROCEDURE — 85379 FIBRIN DEGRADATION QUANT: CPT | Performed by: EMERGENCY MEDICINE

## 2018-05-23 PROCEDURE — 36415 COLL VENOUS BLD VENIPUNCTURE: CPT

## 2018-05-23 PROCEDURE — 94640 AIRWAY INHALATION TREATMENT: CPT

## 2018-05-23 PROCEDURE — 94644 CONT INHLJ TX 1ST HOUR: CPT

## 2018-05-23 PROCEDURE — 84484 ASSAY OF TROPONIN QUANT: CPT | Performed by: EMERGENCY MEDICINE

## 2018-05-23 PROCEDURE — 99223 1ST HOSP IP/OBS HIGH 75: CPT | Performed by: INTERNAL MEDICINE

## 2018-05-23 PROCEDURE — 93970 EXTREMITY STUDY: CPT

## 2018-05-23 PROCEDURE — 83880 ASSAY OF NATRIURETIC PEPTIDE: CPT | Performed by: EMERGENCY MEDICINE

## 2018-05-23 PROCEDURE — 93010 ELECTROCARDIOGRAM REPORT: CPT | Performed by: INTERNAL MEDICINE

## 2018-05-23 PROCEDURE — 96375 TX/PRO/DX INJ NEW DRUG ADDON: CPT

## 2018-05-23 PROCEDURE — 71046 X-RAY EXAM CHEST 2 VIEWS: CPT

## 2018-05-23 RX ORDER — BUDESONIDE AND FORMOTEROL FUMARATE DIHYDRATE 160; 4.5 UG/1; UG/1
2 AEROSOL RESPIRATORY (INHALATION) 2 TIMES DAILY
Status: DISCONTINUED | OUTPATIENT
Start: 2018-05-23 | End: 2018-05-26 | Stop reason: HOSPADM

## 2018-05-23 RX ORDER — IPRATROPIUM BROMIDE AND ALBUTEROL SULFATE 2.5; .5 MG/3ML; MG/3ML
3 SOLUTION RESPIRATORY (INHALATION)
Status: DISCONTINUED | OUTPATIENT
Start: 2018-05-23 | End: 2018-05-23

## 2018-05-23 RX ORDER — LEVALBUTEROL 1.25 MG/.5ML
1.25 SOLUTION, CONCENTRATE RESPIRATORY (INHALATION)
Status: DISCONTINUED | OUTPATIENT
Start: 2018-05-23 | End: 2018-05-26 | Stop reason: HOSPADM

## 2018-05-23 RX ORDER — FUROSEMIDE 10 MG/ML
40 INJECTION INTRAMUSCULAR; INTRAVENOUS DAILY
Status: DISCONTINUED | OUTPATIENT
Start: 2018-05-24 | End: 2018-05-24

## 2018-05-23 RX ORDER — MAGNESIUM SULFATE HEPTAHYDRATE 40 MG/ML
2 INJECTION, SOLUTION INTRAVENOUS ONCE
Status: COMPLETED | OUTPATIENT
Start: 2018-05-23 | End: 2018-05-23

## 2018-05-23 RX ORDER — SODIUM CHLORIDE FOR INHALATION 0.9 %
3 VIAL, NEBULIZER (ML) INHALATION ONCE
Status: COMPLETED | OUTPATIENT
Start: 2018-05-23 | End: 2018-05-23

## 2018-05-23 RX ORDER — LISINOPRIL 10 MG/1
10 TABLET ORAL DAILY
Status: DISCONTINUED | OUTPATIENT
Start: 2018-05-24 | End: 2018-05-26 | Stop reason: HOSPADM

## 2018-05-23 RX ORDER — ASPIRIN 325 MG
325 TABLET ORAL ONCE
Status: COMPLETED | OUTPATIENT
Start: 2018-05-23 | End: 2018-05-23

## 2018-05-23 RX ORDER — FLUTICASONE PROPIONATE 50 MCG
1 SPRAY, SUSPENSION (ML) NASAL DAILY
Status: DISCONTINUED | OUTPATIENT
Start: 2018-05-24 | End: 2018-05-26 | Stop reason: HOSPADM

## 2018-05-23 RX ORDER — METHYLPREDNISOLONE SODIUM SUCCINATE 125 MG/2ML
125 INJECTION, POWDER, LYOPHILIZED, FOR SOLUTION INTRAMUSCULAR; INTRAVENOUS ONCE
Status: COMPLETED | OUTPATIENT
Start: 2018-05-23 | End: 2018-05-23

## 2018-05-23 RX ORDER — OXYCODONE HYDROCHLORIDE AND ACETAMINOPHEN 5; 325 MG/1; MG/1
0.5 TABLET ORAL EVERY 4 HOURS PRN
Status: DISCONTINUED | OUTPATIENT
Start: 2018-05-23 | End: 2018-05-26 | Stop reason: HOSPADM

## 2018-05-23 RX ORDER — FERROUS SULFATE 325(65) MG
325 TABLET ORAL
Status: DISCONTINUED | OUTPATIENT
Start: 2018-05-24 | End: 2018-05-26 | Stop reason: HOSPADM

## 2018-05-23 RX ORDER — POTASSIUM CHLORIDE 20 MEQ/1
20 TABLET, EXTENDED RELEASE ORAL 2 TIMES DAILY
Status: DISCONTINUED | OUTPATIENT
Start: 2018-05-24 | End: 2018-05-26 | Stop reason: HOSPADM

## 2018-05-23 RX ORDER — LISINOPRIL 10 MG/1
10 TABLET ORAL DAILY
COMMUNITY
End: 2018-08-30

## 2018-05-23 RX ORDER — ASPIRIN 81 MG/1
81 TABLET, CHEWABLE ORAL DAILY
Status: DISCONTINUED | OUTPATIENT
Start: 2018-05-24 | End: 2018-05-23

## 2018-05-23 RX ORDER — CHLORAL HYDRATE 500 MG
1000 CAPSULE ORAL 2 TIMES DAILY
Status: DISCONTINUED | OUTPATIENT
Start: 2018-05-23 | End: 2018-05-26 | Stop reason: HOSPADM

## 2018-05-23 RX ORDER — ASPIRIN 81 MG/1
81 TABLET ORAL DAILY
Status: DISCONTINUED | OUTPATIENT
Start: 2018-05-24 | End: 2018-05-26 | Stop reason: HOSPADM

## 2018-05-23 RX ORDER — ALBUTEROL SULFATE 90 UG/1
2 AEROSOL, METERED RESPIRATORY (INHALATION) EVERY 4 HOURS PRN
Status: DISCONTINUED | OUTPATIENT
Start: 2018-05-23 | End: 2018-05-26 | Stop reason: HOSPADM

## 2018-05-23 RX ORDER — PRAVASTATIN SODIUM 80 MG/1
80 TABLET ORAL
Status: DISCONTINUED | OUTPATIENT
Start: 2018-05-24 | End: 2018-05-26 | Stop reason: HOSPADM

## 2018-05-23 RX ORDER — PANTOPRAZOLE SODIUM 40 MG/1
40 TABLET, DELAYED RELEASE ORAL
Status: DISCONTINUED | OUTPATIENT
Start: 2018-05-23 | End: 2018-05-26 | Stop reason: HOSPADM

## 2018-05-23 RX ADMIN — METOPROLOL TARTRATE 25 MG: 25 TABLET, FILM COATED ORAL at 21:50

## 2018-05-23 RX ADMIN — METHYLPREDNISOLONE SODIUM SUCCINATE 125 MG: 125 INJECTION, POWDER, FOR SOLUTION INTRAMUSCULAR; INTRAVENOUS at 15:11

## 2018-05-23 RX ADMIN — IPRATROPIUM BROMIDE 1 MG: 0.5 SOLUTION RESPIRATORY (INHALATION) at 14:00

## 2018-05-23 RX ADMIN — LEVALBUTEROL HYDROCHLORIDE 1.25 MG: 1.25 SOLUTION, CONCENTRATE RESPIRATORY (INHALATION) at 19:27

## 2018-05-23 RX ADMIN — PANTOPRAZOLE SODIUM 40 MG: 40 TABLET, DELAYED RELEASE ORAL at 18:29

## 2018-05-23 RX ADMIN — Medication 16 G: at 18:08

## 2018-05-23 RX ADMIN — ASPIRIN 325 MG: 325 TABLET ORAL at 13:29

## 2018-05-23 RX ADMIN — Medication 1000 MG: at 18:29

## 2018-05-23 RX ADMIN — ISODIUM CHLORIDE 3 ML: 0.03 SOLUTION RESPIRATORY (INHALATION) at 14:00

## 2018-05-23 RX ADMIN — INSULIN LISPRO 12 UNITS: 100 INJECTION, SOLUTION INTRAVENOUS; SUBCUTANEOUS at 21:50

## 2018-05-23 RX ADMIN — ALBUTEROL SULFATE 10 MG: 2.5 SOLUTION RESPIRATORY (INHALATION) at 14:00

## 2018-05-23 RX ADMIN — MAGNESIUM SULFATE HEPTAHYDRATE 2 G: 40 INJECTION, SOLUTION INTRAVENOUS at 15:11

## 2018-05-23 RX ADMIN — IPRATROPIUM BROMIDE 0.5 MG: 0.5 SOLUTION RESPIRATORY (INHALATION) at 19:27

## 2018-05-23 NOTE — RESPIRATORY THERAPY NOTE
RT Protocol Note  Radha Lombardi Sr  81 y o  male MRN: [id number]  [facility] Encounter: [id number]    Assessment    Principal Problem:    Shortness of breath - Chronic respiratory failure with hypoxia  Active Problems:    Dyslipidemia    COPD (chronic obstructive pulmonary disease) ([facility])    Chronic kidney disease, stage 3    Essential hypertension    Insulin dependent diabetes mellitus     Acute on chronic diastolic (congestive) heart failure    NSTEMI type 2     Elevated d-dimer    CAD (coronary artery disease)      Home Pulmonary Medications:  Albuterol, Pulmicort, Spiriva, tiotropium bromide respimat       Past Medical History:   Diagnosis Date    Abdominal pain     Cardiac disease     CHF (congestive heart failure) ([facility])     COPD (chronic obstructive pulmonary disease) ([place] [address] )     Coronary artery disease     Diabetes mellitus ([place] [address] )     Hyperlipidemia     Hypertension     MI (myocardial infarction) ([place] [address] )     with 3 stents    Prostate cancer ([place] [address] )      Social History     Social History    Marital status: /Civil Union     Spouse name: N/A    Number of children: N/A    Years of education: N/A     Social History Main Topics    Smoking status: Former Smoker     Packs/day: 1 50     Years: 50 00     Quit date: 9/13/2017    Smokeless tobacco: Never Used    Alcohol use No    Drug use: No    Sexual activity: No     Other Topics Concern    None     Social History Narrative    None       Subjective    Subjective Data: none    Objective    Physical Exam:   Assessment Type: Assess only  General Appearance: Alert, Awake  Respiratory Pattern: Tachypneic, Shallow  Chest Assessment: Chest expansion symmetrical  Bilateral Breath Sounds: Diminished  Cough: None  O2 Device: NC    Vitals:  Blood pressure 148/66, pulse 103, temperature 98 7 °F (37 1 °C), temperature source Oral, resp  rate 20, weight 127 kg (279 lb), SpO2 91 %            Imaging and other studies: I have personally reviewed pertinent reports  O2 Device: NC     Plan    Respiratory Plan: Moderate/Severe Distress pathway        Resp Comments: pt has hx of copd well known to us here  He takes albuterol and atrovent at home TID, Spiriva, Symbicort, albuterol mdi prn and wears 3LPM NC ATC  Pt no longer a smoker  Pt is very claustrophobic and if bipap is needed at a later time please use face mask that rests on forehead  Will order Q6 udn tx  Pt has right pleural effusion 3mo ago tapped in IR for 1L  He know has a small right pleural effusion per CXR  Will continue to monitor

## 2018-05-23 NOTE — H&P
History & Physical - [facility] - Internal Medicine      PATIENT INFORMATION      Ariana Bledsoe Sr  81 y o  male MRN: [id number]  Unit/Bed#: [facility] 606-01 Encounter: [id number]  Admitting Physician: Allegra Stephenson MD  PCP: Kassy Treviño MD  Date of Admission:  05/23/18      ASSESSMENTS & PLAN       1  Shortness of breath - Chronic respiratory failure with hypoxia  · likely secondary to noncompliance to Lasix - CXR did reveal a small right pleural effusion and patient notes a prior recent history of a left pleural effusion that was drained earlier this year  · remains on baseline 3 L of oxygen via nasal cannula   · states he has had an outpatient sleep study but is not aware of results - may have underlying obstructive sleep apnea    2  Acute on chronic diastolic CHF  · last EF of 65% with grade-1 diastolic dysfunction in September 2017 - check updated echocardiogram  · patient notes noncompliance with home Lasix due to intermittent urinary retention from prior prostate issues (see plan for urinary retention below)  · initiated IV Lasix regimen - continue beta-blockade with Lopressor - monitor I/Os and daily weights  · ProBNP elevated at 904 on admission today  · Maintain oxygenation - currently on baseline 3 L via nasal cannula    3  NSTEMI type 2  · Initial troponin slightly elevated 0 06 - trend serial sets - denies chest pain  · Likely secondary to CHF exacerbation in the setting of chronic kidney disease    4  CAD  · Status post prior stenting - lifestyle/diet modifications encouraged  · Continue ASA/Lopressor/Zestril/statin    5  Insulin-dependent diabetes mellitus  · Last HgA1c of 6 8 in November 2017   · c/w Humulin-R prior to meals w/ additional SSI coverage per accuchecks - monitor for hypoglycemia    6    Elevated D-dimer  · Possibly nonspecific finding although in light of shortness of breath will check a V/Q scan (chronic kidney disease noted) and bilateral LE venous dopplers (lower extremity swelling associated with mild tenderness to palpation)     7  COPD  · Chronic condition - remains at baseline 3 L of oxygen via nasal cannula  · Continue Symbicort regimen with DuoNeb breathing treatments  · Continue to encourage smoking abstinence    8  Chronic kidney disease stage 3  · Baseline creatinine approximately 1 2-1 4 - remains at baseline on admission  · Monitor renal function and limit nephrotoxins if possible    9  Essential hypertension  · Low-sodium diet encouraged - continue Lopressor/Zestril    10  Dyslipidemia  · c/w ASA/statin    11  Urinary retention  · patient has been noncompliant with his Lasix due to persistent urinary retention as he notes he has had prostate issues in the past but also states that his most recent PSA check was unremarkable - notes he has been on Flomax in the past but it was stopped - will appreciate urology evaluation      DVT Prophylaxis:   Lovenox      CHIEF COMPLAINT      Shortness of breath x 1 day      HISTORY OF PRESENT ILLNESS      Edmond Jimenes Sr  is a 81 y o  male who presents with persistent shortness of breath that started this morning upon waking up from sleep  He initially soon was a flare-up of his COPD so he attempted to use his albuterol inhaler which did not give him any relief of symptoms  He did remain on his 3 L of chronic oxygen via nasal cannula  He became anxious and decided to call the ambulance and presented to the ER  Upon my questioning in the ER, he does note that he has been noncompliant with his Lasix at home due to his intermittent urinary retention due to prostate issues  He notes that he was once on Flomax but was stopped and he has no idea why  He also notes that his last PSA check was normal   He notes that he is not always compliant with his low-sodium diet either  He states he stopped smoking last fall    He acknowledges mild worsening of his lower extremity swelling and does state that he has had hyperpigmentation with venous stasis which started approximately 10-15 years ago  A CXR was notable for a small right pleural effusion and he notes that earlier this year he had a left pleural effusion that was drained during a previous hospital course  He denies any other complaints at this time and does state that he generally feels somewhat better than when he 1st arrived  He recalls that he does get intermittent tremors which he states has been chronic for over a year now  REVIEW OF SYSTEMS      Review of Systems - A thorough 12 point review systems was conducted  Pertinent positives and negatives are mentioned in the history of present illness  PAST MEDICAL & SURGICAL HISTORY      Past Medical History:   Diagnosis Date    Abdominal pain     Cardiac disease     [facility]     COPD ([place] obstructive pulmonary disease) [place] [address] )     Coronary artery disease     Diabetes mellitus ([place] [address] )     Hyperlipidemia     Hypertension     MI (myocardial infarction) ([place] [address] )     with 3 stents    Prostate cancer ([place] [address] )        Past Surgical History:   Procedure Laterality Date    ABDOMINAL SURGERY      exploratory    ANGIOPLASTY      3 stents    ESOPHAGOGASTRODUODENOSCOPY N/A 10/2/2017    Procedure: ESOPHAGOGASTRODUODENOSCOPY (EGD); Surgeon: Sheryle Congress, MD;  Location: [facility]; Service: Gastroenterology    PROSTATE SURGERY           MEDICATIONS & ALLERGIES       Prior to Admission medications    Medication Sig Start Date End Date Taking?  Authorizing Provider   albuterol (2 5 mg/3 mL) 0 083 % nebulizer solution Inhale 1 each 4/8/14  Yes Historical Provider, MD   aspirin (ECOTRIN LOW STRENGTH) 81 mg EC tablet Take 1 tablet by mouth daily 12/6/16  Yes Historical Provider, MD   budesonide (PULMICORT) 0 5 mg/2 mL nebulizer solution Inhale 1 each 2 (two) times a day 4/29/16  Yes Historical Provider, MD   budesonide-formoterol (SYMBICORT) 160-4 5 mcg/act inhaler Inhale 2 puffs 2 (two) times a day   Yes Historical Provider, MD   ferrous sulfate 325 (65 Fe) mg tablet Take 325 mg by mouth daily with breakfast   Yes Historical Provider, MD   fluticasone (FLONASE) 50 mcg/act nasal spray 1 spray into each nostril daily 2/13/18  Yes Juan Hager MD   furosemide (LASIX) 40 mg tablet Take 1 tablet by mouth daily 10/5/17  Yes Illona Hashimoto, MD   insulin regular (HumuLIN R U-500) 500 units/mL CONCENTRATED injection Inject 0 09 mL (45 Units total) under the skin 2 (two) times a day before breakfast and lunch 2/12/18  Yes Juan Hager MD   insulin regular (HumuLIN R U-500) 500 units/mL CONCENTRATED injection Inject 0 05 mL (25 Units total) under the skin daily before dinner 2/12/18  Yes Juan Hager MD   Insulin Syringe/Needle U-500 (BD INSULIN SYRINGE U-500) 31G X 6MM 0 5 ML MISC by Does not apply route 8/15/17  Yes Historical Provider, MD   Iron, Ferrous Sulfate, 142 (45 Fe) MG TBCR Take by mouth   Yes Historical Provider, MD   lisinopril (ZESTRIL) 10 mg tablet Take 10 mg by mouth daily Patient unsure of dose of lisinopril     Yes Historical Provider, MD   metoprolol tartrate (LOPRESSOR) 25 mg tablet Take 1 tablet by mouth 2 (two) times a day   Yes Historical Provider, MD   Omega-3 Fatty Acids (FISH OIL) 645 MG CAPS Take 1 capsule by mouth 2 (two) times a day   Yes Historical Provider, MD   oxyCODONE-acetaminophen (PERCOCET) 7 5-325 MG per tablet Take by mouth every 4 (four) hours 3/21/17  Yes Historical Provider, MD   pantoprazole (PROTONIX) 40 mg tablet Take 1 tablet by mouth 2 (two) times a day before meals 10/5/17  Yes Illona Hashimoto, MD   potassium chloride (K-DUR,KLOR-CON) 20 mEq tablet Take 1 tablet (20 mEq total) by mouth 2 (two) times a day 2/12/18  Yes Juan Hager MD   simvastatin (ZOCOR) 10 mg tablet Take 1 tablet by mouth daily 4/29/16  Yes Historical Provider, MD   simvastatin (ZOCOR) 40 mg tablet Take 40 mg by mouth daily 3/15/18  Yes Historical Provider, MD   tiotropium (SPIRIVA) 18 mcg inhalation capsule Place 18 mcg into inhaler and inhale daily   Yes Historical Provider, MD   Tiotropium Bromide-Olodaterol (STIOLTO RESPIMAT) 2 5-2 5 MCG/ACT AERS Inhale 3/21/17  Yes Historical Provider, MD   cyclobenzaprine (FLEXERIL) 10 mg tablet Take 1 tablet by mouth daily at bedtime  Patient taking differently: Take 10 mg by mouth 2 (two) times a day   10/5/17 5/23/18  Rahul Contreras MD         Allergies: Allergies   Allergen Reactions    Metformin GI Intolerance         SOCIAL HISTORY         Marital Status: /Civil Union   Occupation:  Retired  Patient Pre-hospital Living Situation:  Lives at home      Substance Use History:   History   Alcohol Use No     History   Smoking Status    Former Smoker    Packs/day: 1 50    Years: 50 00    Quit date: 9/13/2017   Smokeless Tobacco    Never Used     History   Drug Use No         FAMILY HISTORY      Significant for coronary artery disease in father  Cannot recall other family history at this time        PHYSICAL EXAM      Vitals:   Blood Pressure: 148/66 (05/23/18 1811)  Pulse: 103 (05/23/18 1811)  Temperature: 98 7 °F (37 1 °C) (05/23/18 1811)  Temp Source: Oral (05/23/18 1811)  Respirations: 20 (05/23/18 1811)  Weight - Scale: 127 kg (279 lb) (05/23/18 1243)  SpO2: 91 % (05/23/18 1811)      GENERAL:  Obese - intermittent respiratory distress  HEAD:  Normocephalic - atraumatic  EYES: PERRL - EOMI   MOUTH:  Mucosa moist  NECK:  Supple - full range of motion  CARDIAC:  Intermittently tachycardic - S1/S2 positive  PULMONARY:  Diminished bibasilar breath sounds   ABDOMEN:  Soft - nontender/nondistended - active bowel sounds  MUSCULOSKELETAL:  Motor strength/range of motion intact - 1-2+ pitting distal LE edema  NEUROLOGIC:  Alert/oriented x 3 - cranial nerves grossly intact  SKIN:  Chronic venous stasis changes with hyperpigmentation - chronic wrinkles/blemishes otherwise  PSYCHIATRIC:  Mood/affect stable      ADDITIONAL DATA     Lab Results:       Results from last 7 days  Lab Units 05/23/18  1252   WBC Thousand/uL 10 90*   HEMOGLOBIN g/dL 12 9   HEMATOCRIT % 42 6   PLATELETS Thousands/uL 111*   NEUTROS PCT % 86*   LYMPHS PCT % 7*   MONOS PCT % 5   EOS PCT % 1       Results from last 7 days  Lab Units 05/23/18  1252   SODIUM mmol/L 138   POTASSIUM mmol/L 4 6   CHLORIDE mmol/L 97*   CO2 mmol/L 37*   BUN mg/dL 22   CREATININE mg/dL 1 24   CALCIUM mg/dL 8 6   TOTAL PROTEIN g/dL 6 7   BILIRUBIN TOTAL mg/dL 0 36   ALK PHOS U/L 79   ALT U/L 39   AST U/L 43   GLUCOSE RANDOM mg/dL 154*           Imaging:     X-ray Chest 2 Views    Result Date: 5/23/2018  Narrative: CHEST INDICATION:   chest pain  COMPARISON:  4/27/2018  EXAM PERFORMED/VIEWS:  XR CHEST PA & LATERAL FINDINGS: Cardiomediastinal silhouette is stable  Lungs are clear  Small right pleural effusion is present  Osseous structures appear within normal limits for patient age  Impression: Small right pleural effusion  Workstation performed: [id number]     X-ray Chest 2 Views    Result Date: 4/27/2018  Narrative: CHEST INDICATION:   Dyspnea  COMPARISON:  February 7, 2018 EXAM PERFORMED/VIEWS:  XR CHEST PA & LATERAL FINDINGS: Stable cardiomegaly  Stable small chronic right effusion  No left-sided effusion  No visible infiltrates  No pneumothorax  Osseous structures appear within normal limits for patient age  Impression: Stable cardiomegaly and right pleural effusion Workstation performed: [id number]         Code Status:  Full code      Anticipated Length of Stay:  Patient will be admitted on an Inpatient basis with an anticipated length of stay of  greater than 2 midnights  Justification for Hospital Stay:  Shortness of breath with CHF exacerbation requiring intravenous Lasix and cardiology evaluation      Total Time for Visit, including Counseling / Coordination of Care: 73 minutes    Greater than 50% of this total time spent on direct patient counseling and coordination of care        ** Please Note: This note is constructed using a voice recognition dictation system   **

## 2018-05-23 NOTE — ED ATTENDING ATTESTATION
Maribel Holm MD, saw and evaluated the patient  I have discussed the patient with the resident/non-physician practitioner and agree with the resident's/non-physician practitioner's findings, Plan of Care, and MDM as documented in the resident's/non-physician practitioner's note, except where noted  All available labs and Radiology studies were reviewed  At this point I agree with the current assessment done in the Emergency Department  I have conducted an independent evaluation of this patient a history and physical is as follows:    Patient with history of COPD, CHF, chronic home oxygen use, presenting with shortness of breath  Patient awoke this morning and said he felt so short of breath that he could not move  Patient denies cough  Patient called EMS, received albuterol and Atrovent and route with improvement in his symptoms, and states that he is not short of breath  Patient did have diaphoresis with his symptoms, and developed diaphoresis in the emergency department related getting his chest x-ray  He denies fevers or chills  He denies lateralizing limb swelling  He has no history of thromboembolic disease  The patient's review of systems otherwise negative in 12 systems reviewed  On exam the patient appears dyspneic and slightly diaphoretic  HEENT exam is nonfocal   His neck is supple and nontender  His heart is regular without murmurs  His lungs are decreased with poor air movement throughout  There are no focal findings  Abdomen is soft and nontender with no masses, rebound, or guarding  His extremities are intact with trace edema  His skin and neurological exams are normal   Impression:  COPD exacerbation, troponin is positive  EKG with right bundle branch block which is old  Impression:  Non STEMI with COPD exacerbation    Will treat for COPD exacerbation, aspirin, admit  Critical Care Time  CritCare Time    Procedures

## 2018-05-23 NOTE — ED PROVIDER NOTES
History  Chief Complaint   Patient presents with    Shortness of Breath     Patient reports he started yesterday with worsening of his usual SOB, patient states this morning it got worse  Patient states he started this morning with worsening SOB even while at rest along with shaking  Patient has COPD and wears 3 liters nasal cannula at home  Patient reports "a little" CP on the right side of his chest that is nonradiating in nature  82 yo M with PMH COPD, diastolic CHF, CAD with history of MI s/p stents x3 presents to ED for acute shortness of breath that pt says woke him from sleep this AM  Pt says that he was so short of breath that he couldn't move but improved after using his albuterol inhaler, and he was able to get out of bed  Pt says he then called EMS and was brought to ED  Pt says he got an albuterol neb en route which also helped  He says he feels almost back to baseline at this point regarding his shortness of breath  Per chart review, pt was discharged at the end of April for COPD exacerbation  He says he also had shortness of breath back in February and at that time had pleural effusion that was drained by IR  He denies chest pain  However, he did wake up diaphoretic  Pt on 3L NC at baseline  Does not take steroids daily  Noncompliant with lasix but denies any worsening of lower extremity swelling or known weight gain  Prior to Admission Medications   Prescriptions Last Dose Informant Patient Reported? Taking?    Insulin Syringe/Needle U-500 (BD INSULIN SYRINGE U-500) 31G X 6MM 0 5 ML MISC 5/23/2018 at Unknown time  Yes Yes   Sig: by Does not apply route   Iron, Ferrous Sulfate, 142 (45 Fe) MG TBCR 5/23/2018 at Unknown time  Yes Yes   Sig: Take by mouth   Omega-3 Fatty Acids (FISH OIL) 645 MG CAPS 5/23/2018 at Unknown time  Yes Yes   Sig: Take 1 capsule by mouth 2 (two) times a day   Tiotropium Bromide-Olodaterol (STIOLTO RESPIMAT) 2 5-2 5 MCG/ACT AERS 5/23/2018 at Unknown time  Yes Yes Sig: Inhale   albuterol (2 5 mg/3 mL) 0 083 % nebulizer solution 2018 at Unknown time  Yes Yes   Sig: Inhale 1 each   aspirin (ECOTRIN LOW STRENGTH) 81 mg EC tablet 2018 at Unknown time  Yes Yes   Sig: Take 1 tablet by mouth daily   budesonide (PULMICORT) 0 5 mg/2 mL nebulizer solution 2018 at Unknown time  Yes Yes   Sig: Inhale 1 each 2 (two) times a day   budesonide-formoterol (SYMBICORT) 160-4 5 mcg/act inhaler 2018 at Unknown time  Yes Yes   Sig: Inhale 2 puffs 2 (two) times a day   ferrous sulfate 325 (65 Fe) mg tablet 2018 at Unknown time  Yes Yes   Sig: Take 325 mg by mouth daily with breakfast   fluticasone (FLONASE) 50 mcg/act nasal spray 2018 at Unknown time  No Yes   Si spray into each nostril daily   furosemide (LASIX) 40 mg tablet 2018 at Unknown time  No Yes   Sig: Take 1 tablet by mouth daily   insulin regular (HumuLIN R U-500) 500 units/mL CONCENTRATED injection 2018 at Unknown time  No Yes   Sig: Inject 0 09 mL (45 Units total) under the skin 2 (two) times a day before breakfast and lunch   insulin regular (HumuLIN R U-500) 500 units/mL CONCENTRATED injection 2018 at Unknown time  No Yes   Sig: Inject 0 05 mL (25 Units total) under the skin daily before dinner   lisinopril (ZESTRIL) 10 mg tablet 2018 at Unknown time  Yes Yes   Sig: Take 10 mg by mouth daily Patient unsure of dose of lisinopril    metoprolol tartrate (LOPRESSOR) 25 mg tablet 2018 at Unknown time  Yes Yes   Sig: Take 1 tablet by mouth 2 (two) times a day   oxyCODONE-acetaminophen (PERCOCET) 7 5-325 MG per tablet 2018 at Unknown time  Yes Yes   Sig: Take by mouth every 4 (four) hours   pantoprazole (PROTONIX) 40 mg tablet 2018 at Unknown time  No Yes   Sig: Take 1 tablet by mouth 2 (two) times a day before meals   potassium chloride (K-DUR,KLOR-CON) 20 mEq tablet 2018 at Unknown time  No Yes   Sig: Take 1 tablet (20 mEq total) by mouth 2 (two) times a day simvastatin (ZOCOR) 10 mg tablet 5/23/2018 at Unknown time  Yes Yes   Sig: Take 1 tablet by mouth daily   simvastatin (ZOCOR) 40 mg tablet 5/23/2018 at Unknown time  Yes Yes   Sig: Take 40 mg by mouth daily   tiotropium (SPIRIVA) 18 mcg inhalation capsule 5/23/2018 at Unknown time  Yes Yes   Sig: Place 18 mcg into inhaler and inhale daily      Facility-Administered Medications: None       Past Medical History:   Diagnosis Date    Abdominal pain     Cardiac disease     [facility]     COPD (chronic obstructive pulmonary disease) ([place] [address] )     Coronary artery disease     Diabetes mellitus ([place] [address] )     Hyperlipidemia     Hypertension     MI (myocardial infarction) ([place] [address] )     with 3 stents    Prostate cancer ([place] [address] )        Past Surgical History:   Procedure Laterality Date    ABDOMINAL SURGERY      exploratory    ANGIOPLASTY      3 stents    ESOPHAGOGASTRODUODENOSCOPY N/A 10/2/2017    Procedure: ESOPHAGOGASTRODUODENOSCOPY (EGD); Surgeon: Damon Reyes MD;  Location: [facility]; Service: Gastroenterology    PROSTATE SURGERY         Family History   Problem Relation Age of Onset    Heart disease Father      I have reviewed and agree with the history as documented  Social History   Substance Use Topics    Smoking status: Former Smoker     Packs/day: 1 50     Years: 50 00     Quit date: 9/13/2017    Smokeless tobacco: Never Used    Alcohol use No        Review of Systems   Constitutional: Positive for diaphoresis and fatigue  Negative for chills and fever  HENT: Negative for congestion, rhinorrhea, sore throat and trouble swallowing  Eyes: Negative for pain, discharge and visual disturbance  Respiratory: Positive for shortness of breath  Negative for cough and chest tightness  Cardiovascular: Positive for leg swelling  Negative for chest pain and palpitations  Gastrointestinal: Negative for abdominal pain, nausea and vomiting     Genitourinary: Negative for decreased urine volume, dysuria, frequency and urgency  Musculoskeletal: Negative for gait problem, neck pain and neck stiffness  Skin: Negative for color change, rash and wound  Neurological: Negative for dizziness, syncope, light-headedness and headaches  Physical Exam  ED Triage Vitals [05/23/18 1243]   Temperature Pulse Respirations Blood Pressure SpO2   98 5 °F (36 9 °C) 105 22 161/58 96 %      Temp Source Heart Rate Source Patient Position - Orthostatic VS BP Location FiO2 (%)   Oral Monitor Sitting Right arm --      Pain Score       8           Orthostatic Vital Signs  Vitals:    05/23/18 1243 05/23/18 1337 05/23/18 1345 05/23/18 1513   BP: 161/58 101/58 123/58 104/54   Pulse: 105 101 102 99   Patient Position - Orthostatic VS: Sitting Sitting Sitting Lying       Physical Exam   Constitutional: He is oriented to person, place, and time  He appears well-developed and well-nourished  No distress  HENT:   Head: Normocephalic and atraumatic  Mouth/Throat: Oropharynx is clear and moist    Eyes: Conjunctivae and EOM are normal  Right eye exhibits no discharge  Left eye exhibits no discharge  Neck: Normal range of motion  Neck supple  Cardiovascular: Regular rhythm and intact distal pulses  tachycardic   Pulmonary/Chest: No stridor  No respiratory distress  Mildly increased work of breathing  Significantly decreased breath sounds  Minimal air movement heard  No wheezes  Abdominal: Soft  He exhibits distension (obese)  There is no tenderness  There is no guarding  Musculoskeletal: He exhibits edema (bilateral)  He exhibits no tenderness or deformity  Neurological: He is alert and oriented to person, place, and time  No sensory deficit  He exhibits normal muscle tone  Skin: Skin is warm and dry  BLE stasis changes   Nursing note and vitals reviewed        ED Medications  Medications    EMS REPLENISHMENT MED (not administered)   albuterol inhalation solution 10 mg (10 mg Nebulization Given 5/23/18 1400)     And   ipratropium (ATROVENT) 0 02 % inhalation solution 1 mg (1 mg Nebulization Given 5/23/18 1400)     And   sodium chloride 0 9 % inhalation solution 3 mL (3 mL Nebulization Given 5/23/18 1400)   aspirin tablet 325 mg (325 mg Oral Given 5/23/18 1329)   methylPREDNISolone sodium succinate (Solu-MEDROL) injection 125 mg (125 mg Intravenous Given 5/23/18 1511)   magnesium sulfate 2 g/50 mL IVPB (premix) 2 g (0 g Intravenous Stopped 5/23/18 1528)       Diagnostic Studies  Results Reviewed     Procedure Component Value Units Date/Time    D-dimer, quantitative [[id number]]  (Abnormal) Collected:  05/23/18 1252    Lab Status:  Final result Specimen:  Blood from Arm, Right Updated:  05/23/18 1421     D-Dimer, Quant 522 (H) ng/ml (FEU)     BNP [[id number]]  (Abnormal) Collected:  05/23/18 1252    Lab Status:  Final result Specimen:  Blood from Arm, Right Updated:  05/23/18 1416     NT-proBNP 904 (H) pg/mL     Comprehensive metabolic panel [[id number]]  (Abnormal) Collected:  05/23/18 1252    Lab Status:  Final result Specimen:  Blood from Arm, Right Updated:  05/23/18 1329     Sodium 138 mmol/L      Potassium 4 6 mmol/L      Chloride 97 (L) mmol/L      CO2 37 (H) mmol/L      Anion Gap 4 mmol/L      BUN 22 mg/dL      Creatinine 1 24 mg/dL      Glucose 154 (H) mg/dL      Calcium 8 6 mg/dL      AST 43 U/L      ALT 39 U/L      Alkaline Phosphatase 79 U/L      Total Protein 6 7 g/dL      Albumin 2 9 (L) g/dL      Total Bilirubin 0 36 mg/dL      eGFR 59 ml/min/1 73sq m     Narrative:         National Kidney Disease Education Program recommendations are as follows:  GFR calculation is accurate only with a steady state creatinine  Chronic Kidney disease less than 60 ml/min/1 73 sq  meters  Kidney failure less than 15 ml/min/1 73 sq  meters      Troponin I [[id number]]  (Abnormal) Collected:  05/23/18 1252    Lab Status:  Final result Specimen:  Blood from Arm, Right Updated:  05/23/18 1324     Troponin I 0 06 (H) ng/mL Narrative:         [employer] Chemistry analyzer 99% cutoff is > 0 04 ng/mL in network labs    o cTnI 99% cutoff is useful only when applied to patients in the clinical setting of myocardial ischemia  o cTnI 99% cutoff should be interpreted in the context of clinical history, ECG findings and possibly cardiac imaging to establish correct diagnosis  o cTnI 99% cutoff may be suggestive but clearly not indicative of a coronary event without the clinical setting of myocardial ischemia  CBC and differential [[id number]]  (Abnormal) Collected:  05/23/18 1252    Lab Status:  Final result Specimen:  Blood from Arm, Right Updated:  05/23/18 1309     WBC 10 90 (H) Thousand/uL      RBC 4 74 Million/uL      Hemoglobin 12 9 g/dL      Hematocrit 42 6 %      MCV 90 fL      MCH 27 2 pg      MCHC 30 3 (L) g/dL      RDW 15 4 (H) %      MPV 10 0 fL      Platelets 177 (L) Thousands/uL      nRBC 0 /100 WBCs      Neutrophils Relative 86 (H) %      Lymphocytes Relative 7 (L) %      Monocytes Relative 5 %      Eosinophils Relative 1 %      Basophils Relative 0 %      Neutrophils Absolute 9 37 (H) Thousands/µL      Lymphocytes Absolute 0 79 Thousands/µL      Monocytes Absolute 0 58 Thousand/µL      Eosinophils Absolute 0 06 Thousand/µL      Basophils Absolute 0 02 Thousands/µL                  X-ray chest 2 views   Final Result by Parvin Mcmahan MD (05/23 8118)      Small right pleural effusion              Workstation performed: [id number]               Procedures  ECG 12 Lead Documentation  Date/Time: 5/23/2018 1:19 PM  Performed by: Ramandeep Sargent by: Augusta Nickerson     Indications / Diagnosis:  Short of breath  ECG reviewed by me, the ED Provider: yes    Patient location:  ED  Previous ECG:     Previous ECG:  Compared to current    Similarity:  No change  Rate:     ECG rate:  104    ECG rate assessment: tachycardic    Rhythm:     Rhythm: sinus rhythm    Conduction:     Conduction: abnormal      Abnormal conduction comment:  RBBB  ST segments:     ST segments:  Normal  T waves:     T waves: normal            Phone Consults  ED Phone Contact    ED Course                               MDM  Number of Diagnoses or Management Options  COPD exacerbation ([facility] ):   Shortness of breath:   Diagnosis management comments: Acute shortness of breath with diaphoresis  Possible COPD exacerbation- pt did feel better with albuterol  Will order heart neb, mag, solumedrol  Pt does appear somewhat fluid overloaded, but likely not worse than baseline  Small pleural effusion on cxr  bnp 904  D-dimer by age adjusted negative  First troponin 0 06  EKG unchanged from prior  Will admit to [facility]  Currently without increased O2 requirement from baseline  CritCare Time    Disposition  Final diagnoses:   Shortness of breath   COPD exacerbation ([facility] )     Time reflects when diagnosis was documented in both MDM as applicable and the Disposition within this note     Time User Action Codes Description Comment    5/23/2018  3:13 PM Barb Leonard [R06 [id number]] Shortness of breath     5/23/2018  3:13 PM Divine Rodriguez [[id number]] COPD exacerbation [facility])       ED Disposition     ED Disposition Condition Comment    Admit  Case was discussed with VIKTORIA and the patient's admission status was agreed to be Admission Status: inpatient status to the service of Dr Lois Rollins   Follow-up Information    None         Patient's Medications   Discharge Prescriptions    No medications on file     No discharge procedures on file  ED Provider  Attending physically available and evaluated Nikki Raymundo I managed the patient along with the ED Attending      Electronically Signed by         Rika Martini MD  05/23/18 2465

## 2018-05-24 ENCOUNTER — APPOINTMENT (INPATIENT)
Dept: NON INVASIVE DIAGNOSTICS | Facility: HOSPITAL | Age: 73
DRG: 280 | End: 2018-05-24
Payer: COMMERCIAL

## 2018-05-24 ENCOUNTER — APPOINTMENT (INPATIENT)
Dept: RADIOLOGY | Facility: HOSPITAL | Age: 73
DRG: 280 | End: 2018-05-24
Payer: COMMERCIAL

## 2018-05-24 ENCOUNTER — TELEPHONE (OUTPATIENT)
Dept: OTHER | Facility: HOSPITAL | Age: 73
End: 2018-05-24

## 2018-05-24 PROBLEM — E87.1 HYPONATREMIA: Status: ACTIVE | Noted: 2018-05-24

## 2018-05-24 PROBLEM — R35.0 URINARY FREQUENCY: Status: ACTIVE | Noted: 2018-05-23

## 2018-05-24 LAB
ALBUMIN SERPL BCP-MCNC: 2.8 G/DL (ref 3.5–5)
ALP SERPL-CCNC: 82 U/L (ref 46–116)
ALT SERPL W P-5'-P-CCNC: 36 U/L (ref 12–78)
ANION GAP SERPL CALCULATED.3IONS-SCNC: 5 MMOL/L (ref 4–13)
ANION GAP SERPL CALCULATED.3IONS-SCNC: 6 MMOL/L (ref 4–13)
AST SERPL W P-5'-P-CCNC: 34 U/L (ref 5–45)
BILIRUB SERPL-MCNC: 0.31 MG/DL (ref 0.2–1)
BUN SERPL-MCNC: 21 MG/DL (ref 5–25)
BUN SERPL-MCNC: 22 MG/DL (ref 5–25)
CALCIUM SERPL-MCNC: 8.3 MG/DL (ref 8.3–10.1)
CALCIUM SERPL-MCNC: 8.4 MG/DL (ref 8.3–10.1)
CHLORIDE SERPL-SCNC: 93 MMOL/L (ref 100–108)
CHLORIDE SERPL-SCNC: 93 MMOL/L (ref 100–108)
CHOLEST SERPL-MCNC: 157 MG/DL (ref 50–200)
CO2 SERPL-SCNC: 32 MMOL/L (ref 21–32)
CO2 SERPL-SCNC: 34 MMOL/L (ref 21–32)
CREAT SERPL-MCNC: 1.43 MG/DL (ref 0.6–1.3)
CREAT SERPL-MCNC: 1.47 MG/DL (ref 0.6–1.3)
ERYTHROCYTE [DISTWIDTH] IN BLOOD BY AUTOMATED COUNT: 15.2 % (ref 11.6–15.1)
EST. AVERAGE GLUCOSE BLD GHB EST-MCNC: 180 MG/DL
GFR SERPL CREATININE-BSD FRML MDRD: 48 ML/MIN/1.73SQ M
GFR SERPL CREATININE-BSD FRML MDRD: 49 ML/MIN/1.73SQ M
GLUCOSE SERPL-MCNC: 164 MG/DL (ref 65–140)
GLUCOSE SERPL-MCNC: 319 MG/DL (ref 65–140)
GLUCOSE SERPL-MCNC: 408 MG/DL (ref 65–140)
GLUCOSE SERPL-MCNC: 428 MG/DL (ref 65–140)
GLUCOSE SERPL-MCNC: 433 MG/DL (ref 65–140)
GLUCOSE SERPL-MCNC: 66 MG/DL (ref 65–140)
GLUCOSE SERPL-MCNC: 69 MG/DL (ref 65–140)
GLUCOSE SERPL-MCNC: 98 MG/DL (ref 65–140)
HBA1C MFR BLD: 7.9 % (ref 4.2–6.3)
HCT VFR BLD AUTO: 40.9 % (ref 36.5–49.3)
HDLC SERPL-MCNC: 80 MG/DL (ref 40–60)
HGB BLD-MCNC: 12.4 G/DL (ref 12–17)
LDLC SERPL CALC-MCNC: 58 MG/DL (ref 0–100)
MAGNESIUM SERPL-MCNC: 2.6 MG/DL (ref 1.6–2.6)
MCH RBC QN AUTO: 27.2 PG (ref 26.8–34.3)
MCHC RBC AUTO-ENTMCNC: 30.3 G/DL (ref 31.4–37.4)
MCV RBC AUTO: 90 FL (ref 82–98)
NONHDLC SERPL-MCNC: 77 MG/DL
PHOSPHATE SERPL-MCNC: 2.8 MG/DL (ref 2.3–4.1)
PLATELET # BLD AUTO: 124 THOUSANDS/UL (ref 149–390)
PMV BLD AUTO: 10.1 FL (ref 8.9–12.7)
POTASSIUM SERPL-SCNC: 5.3 MMOL/L (ref 3.5–5.3)
POTASSIUM SERPL-SCNC: 6 MMOL/L (ref 3.5–5.3)
PROT SERPL-MCNC: 7 G/DL (ref 6.4–8.2)
RBC # BLD AUTO: 4.56 MILLION/UL (ref 3.88–5.62)
SODIUM SERPL-SCNC: 131 MMOL/L (ref 136–145)
SODIUM SERPL-SCNC: 132 MMOL/L (ref 136–145)
TRIGL SERPL-MCNC: 95 MG/DL
TROPONIN I SERPL-MCNC: 0.02 NG/ML
TROPONIN I SERPL-MCNC: 0.03 NG/ML
TSH SERPL DL<=0.05 MIU/L-ACNC: 0.27 UIU/ML (ref 0.36–3.74)
WBC # BLD AUTO: 8.39 THOUSAND/UL (ref 4.31–10.16)

## 2018-05-24 PROCEDURE — 99232 SBSQ HOSP IP/OBS MODERATE 35: CPT | Performed by: PHYSICIAN ASSISTANT

## 2018-05-24 PROCEDURE — 84484 ASSAY OF TROPONIN QUANT: CPT | Performed by: INTERNAL MEDICINE

## 2018-05-24 PROCEDURE — 99222 1ST HOSP IP/OBS MODERATE 55: CPT | Performed by: NURSE PRACTITIONER

## 2018-05-24 PROCEDURE — 82948 REAGENT STRIP/BLOOD GLUCOSE: CPT

## 2018-05-24 PROCEDURE — 80053 COMPREHEN METABOLIC PANEL: CPT | Performed by: INTERNAL MEDICINE

## 2018-05-24 PROCEDURE — 94640 AIRWAY INHALATION TREATMENT: CPT

## 2018-05-24 PROCEDURE — 99223 1ST HOSP IP/OBS HIGH 75: CPT | Performed by: INTERNAL MEDICINE

## 2018-05-24 PROCEDURE — 94760 N-INVAS EAR/PLS OXIMETRY 1: CPT

## 2018-05-24 PROCEDURE — 80048 BASIC METABOLIC PNL TOTAL CA: CPT | Performed by: PHYSICIAN ASSISTANT

## 2018-05-24 PROCEDURE — 78582 LUNG VENTILAT&PERFUS IMAGING: CPT

## 2018-05-24 PROCEDURE — 84443 ASSAY THYROID STIM HORMONE: CPT | Performed by: INTERNAL MEDICINE

## 2018-05-24 PROCEDURE — 83036 HEMOGLOBIN GLYCOSYLATED A1C: CPT | Performed by: INTERNAL MEDICINE

## 2018-05-24 PROCEDURE — A9540 TC99M MAA: HCPCS

## 2018-05-24 PROCEDURE — 80061 LIPID PANEL: CPT | Performed by: INTERNAL MEDICINE

## 2018-05-24 PROCEDURE — 93970 EXTREMITY STUDY: CPT | Performed by: SURGERY

## 2018-05-24 PROCEDURE — 85027 COMPLETE CBC AUTOMATED: CPT | Performed by: INTERNAL MEDICINE

## 2018-05-24 PROCEDURE — 83735 ASSAY OF MAGNESIUM: CPT | Performed by: INTERNAL MEDICINE

## 2018-05-24 PROCEDURE — A9558 XE133 XENON 10MCI: HCPCS

## 2018-05-24 PROCEDURE — 84100 ASSAY OF PHOSPHORUS: CPT | Performed by: INTERNAL MEDICINE

## 2018-05-24 RX ORDER — FUROSEMIDE 10 MG/ML
40 INJECTION INTRAMUSCULAR; INTRAVENOUS
Status: DISCONTINUED | OUTPATIENT
Start: 2018-05-24 | End: 2018-05-26 | Stop reason: HOSPADM

## 2018-05-24 RX ORDER — TAMSULOSIN HYDROCHLORIDE 0.4 MG/1
0.4 CAPSULE ORAL
Status: DISCONTINUED | OUTPATIENT
Start: 2018-05-24 | End: 2018-05-26 | Stop reason: HOSPADM

## 2018-05-24 RX ORDER — LEVALBUTEROL 1.25 MG/.5ML
SOLUTION, CONCENTRATE RESPIRATORY (INHALATION)
Status: COMPLETED
Start: 2018-05-24 | End: 2018-05-24

## 2018-05-24 RX ORDER — IPRATROPIUM BROMIDE AND ALBUTEROL SULFATE 2.5; .5 MG/3ML; MG/3ML
SOLUTION RESPIRATORY (INHALATION)
Status: DISPENSED
Start: 2018-05-24 | End: 2018-05-24

## 2018-05-24 RX ADMIN — Medication 1000 MG: at 09:17

## 2018-05-24 RX ADMIN — TAMSULOSIN HYDROCHLORIDE 0.4 MG: 0.4 CAPSULE ORAL at 18:17

## 2018-05-24 RX ADMIN — OXYCODONE HYDROCHLORIDE AND ACETAMINOPHEN 0.5 TABLET: 5; 325 TABLET ORAL at 06:14

## 2018-05-24 RX ADMIN — OXYCODONE HYDROCHLORIDE AND ACETAMINOPHEN 0.5 TABLET: 5; 325 TABLET ORAL at 22:16

## 2018-05-24 RX ADMIN — BUDESONIDE AND FORMOTEROL FUMARATE DIHYDRATE 2 PUFF: 160; 4.5 AEROSOL RESPIRATORY (INHALATION) at 09:33

## 2018-05-24 RX ADMIN — Medication 1000 MG: at 18:17

## 2018-05-24 RX ADMIN — INSULIN HUMAN 25 UNITS: 500 INJECTION, SOLUTION SUBCUTANEOUS at 18:13

## 2018-05-24 RX ADMIN — BUDESONIDE AND FORMOTEROL FUMARATE DIHYDRATE 2 PUFF: 160; 4.5 AEROSOL RESPIRATORY (INHALATION) at 18:19

## 2018-05-24 RX ADMIN — METOPROLOL TARTRATE 25 MG: 25 TABLET, FILM COATED ORAL at 22:16

## 2018-05-24 RX ADMIN — IPRATROPIUM BROMIDE 0.5 MG: 0.5 SOLUTION RESPIRATORY (INHALATION) at 21:37

## 2018-05-24 RX ADMIN — IPRATROPIUM BROMIDE 0.5 MG: 0.5 SOLUTION RESPIRATORY (INHALATION) at 07:12

## 2018-05-24 RX ADMIN — INSULIN LISPRO 12 UNITS: 100 INJECTION, SOLUTION INTRAVENOUS; SUBCUTANEOUS at 09:14

## 2018-05-24 RX ADMIN — INSULIN LISPRO 8 UNITS: 100 INJECTION, SOLUTION INTRAVENOUS; SUBCUTANEOUS at 13:00

## 2018-05-24 RX ADMIN — OXYCODONE HYDROCHLORIDE AND ACETAMINOPHEN 0.5 TABLET: 5; 325 TABLET ORAL at 12:57

## 2018-05-24 RX ADMIN — LEVALBUTEROL HYDROCHLORIDE 1.25 MG: 1.25 SOLUTION, CONCENTRATE RESPIRATORY (INHALATION) at 00:46

## 2018-05-24 RX ADMIN — IPRATROPIUM BROMIDE 0.5 MG: 0.5 SOLUTION RESPIRATORY (INHALATION) at 00:46

## 2018-05-24 RX ADMIN — ASPIRIN 81 MG: 81 TABLET, COATED ORAL at 09:17

## 2018-05-24 RX ADMIN — IPRATROPIUM BROMIDE 0.5 MG: 0.5 SOLUTION RESPIRATORY (INHALATION) at 13:17

## 2018-05-24 RX ADMIN — METOPROLOL TARTRATE 25 MG: 25 TABLET, FILM COATED ORAL at 09:18

## 2018-05-24 RX ADMIN — LEVALBUTEROL HYDROCHLORIDE 1.25 MG: 1.25 SOLUTION, CONCENTRATE RESPIRATORY (INHALATION) at 07:12

## 2018-05-24 RX ADMIN — POTASSIUM CHLORIDE 20 MEQ: 1500 TABLET, EXTENDED RELEASE ORAL at 09:17

## 2018-05-24 RX ADMIN — INSULIN HUMAN 45 UNITS: 500 INJECTION, SOLUTION SUBCUTANEOUS at 09:14

## 2018-05-24 RX ADMIN — PANTOPRAZOLE SODIUM 40 MG: 40 TABLET, DELAYED RELEASE ORAL at 18:17

## 2018-05-24 RX ADMIN — PANTOPRAZOLE SODIUM 40 MG: 40 TABLET, DELAYED RELEASE ORAL at 06:09

## 2018-05-24 RX ADMIN — LEVALBUTEROL HYDROCHLORIDE 1.25 MG: 1.25 SOLUTION, CONCENTRATE RESPIRATORY (INHALATION) at 21:36

## 2018-05-24 RX ADMIN — PRAVASTATIN SODIUM 80 MG: 80 TABLET ORAL at 18:17

## 2018-05-24 RX ADMIN — FUROSEMIDE 40 MG: 10 INJECTION, SOLUTION INTRAMUSCULAR; INTRAVENOUS at 18:15

## 2018-05-24 RX ADMIN — POTASSIUM CHLORIDE 20 MEQ: 1500 TABLET, EXTENDED RELEASE ORAL at 18:17

## 2018-05-24 RX ADMIN — FUROSEMIDE 40 MG: 10 INJECTION, SOLUTION INTRAMUSCULAR; INTRAVENOUS at 09:23

## 2018-05-24 RX ADMIN — INSULIN LISPRO 2 UNITS: 100 INJECTION, SOLUTION INTRAVENOUS; SUBCUTANEOUS at 18:13

## 2018-05-24 RX ADMIN — INSULIN HUMAN 45 UNITS: 500 INJECTION, SOLUTION SUBCUTANEOUS at 13:00

## 2018-05-24 RX ADMIN — ENOXAPARIN SODIUM 40 MG: 40 INJECTION SUBCUTANEOUS at 09:26

## 2018-05-24 RX ADMIN — LISINOPRIL 10 MG: 10 TABLET ORAL at 09:17

## 2018-05-24 RX ADMIN — LEVALBUTEROL HYDROCHLORIDE 1.25 MG: 1.25 SOLUTION, CONCENTRATE RESPIRATORY (INHALATION) at 13:17

## 2018-05-24 RX ADMIN — FLUTICASONE PROPIONATE 1 SPRAY: 50 SPRAY, METERED NASAL at 09:27

## 2018-05-24 RX ADMIN — Medication 325 MG: at 09:17

## 2018-05-24 NOTE — NURSING NOTE
2nd troponin was sent 5/23 at 2200  Per lab, troponin was never received  Followed up drawing 2nd troponin 0100 and 3rd troponin will be drawn 0400

## 2018-05-24 NOTE — CONSULTS
Consultation - Cardiology Team One  Ariana Bledsoe Sr  81 y o  male MRN: [id number]  Unit/Bed#: [facility] 606-01 Encounter: [id number]    Inpatient consult to Cardiology  Consult performed by: Edgardo Mccarty ordered by: Rina Alfredo        Chief Complaint   Patient presents with    Shortness of Breath       Patient reports he started yesterday with worsening of his usual SOB, patient states this morning it got worse  Patient states he started this morning with worsening SOB even while at rest along with shaking  Patient has COPD and wears 3 liters nasal cannula at home  Patient reports "a little" CP on the right side of his chest that is nonradiating in nature  Physician Requesting Consult: Leopoldo Conquest, DO  Reason for Consult / Principal Problem: SOB    History of Present Illness   HPI: Ariana Bledsoe Sr  is a 81y o  year old male who has a history of coronary artery disease, (status post PCI stents in RCA and Di 1, 2009) hypertension, dyslipidemia, chronic diastolic HF, CKD 3, COPD and pulmonary hypertension  He Has chronic hypoxemic respiratory failure and is on chronic oxygen at 2 5 to 3 L nasal cannula  He also has chronic venous stasis of the lower extremities  Diane Lomeli He follows with Dr Ramirez Montelongo as an outpatient             He presented to [facility] last evening with dyspnea and orthopnea  He tried his albuterol inhaler which did not help  Notably he admits to noncompliance with his home Lasix due to urinary retention related to prostate problems, as well as non adherence to a low-sodium diet  In the past he was on Flomax this was stopped but unclear why    He was last admitted for heart failure in February of this year  He did have a moderate right sized pleural effusion that was drained by IR   1000 cc of clear sophy fluid was aspirated  Labs were sent  Follow up with pulmonology was recommended  Data  EKG sinus tachycardia  104 beats per minute    Right bundle branch block-similar prior  Tele: SR   Troponin 0 06, 0 03, 0 02  NT proBNP 904  Creatinine 1 24, BUN 22  Chest x-ray:  Small right pleural effusion  TSH 0 27  Hemoglobin A1c 7 9  Lower extremity duplex:  Pending  V/Q study:  Pending    He was diagnosed with acute on chronic diastolic heart failure by the primary team   He was started on IV Lasix  Urine output is 1 7 L thus far  He is net -8 5 5 mL  Due to his  issues and reason for noncompliance with his diuretic therapy,, a urology consultation was requested  A current echocardiogram has been requested    Echocardiogram September 2017:  Ejection fraction 65%  There was grade 1 diastolic dysfunction  There is no significant valve disease  Cardiac catheterization September 2015:  Proximal LAD: Lesion 1: 30 % stenosis  D1: Lesion 1: discrete, 50 % stenosis  Lesion 2: 0 % stenosis, site of prior   stent  Proximal RCA: Lesion 1: discrete, 30 % stenosis  Mid RCA: Lesion 1: 0 % stenosis, site of prior stent  Distal RCA: Lesion 1: 0 % stenosis, site of prior stent    Review of Systems   Constitution: Positive for weakness and malaise/fatigue  Negative for chills  Cardiovascular: Positive for dyspnea on exertion and leg swelling  Negative for chest pain, claudication, cyanosis, irregular heartbeat, near-syncope, orthopnea, palpitations, paroxysmal nocturnal dyspnea and syncope  Respiratory: Positive for cough and shortness of breath  Gastrointestinal: Negative for anorexia, nausea and vomiting  Neurological: Negative for dizziness, focal weakness, headaches and light-headedness  All other systems reviewed and are negative      Historical Information   Past Medical History:   Diagnosis Date    Abdominal pain     Cardiac disease     CHF (congestive heart failure) (HCC)     COPD (chronic obstructive pulmonary disease) (HCC)     Coronary artery disease     Diabetes mellitus (HCC)     Hyperlipidemia     Hypertension     MI (myocardial infarction) [facility])     with 3 stents    Prostate cancer [facility])      Past Surgical History:   Procedure Laterality Date    ABDOMINAL SURGERY      exploratory    ANGIOPLASTY      3 stents    ESOPHAGOGASTRODUODENOSCOPY N/A 10/2/2017    Procedure: ESOPHAGOGASTRODUODENOSCOPY (EGD); Surgeon: Aníbal Cordova MD;  Location:  GI LAB;   Service: Gastroenterology    PROSTATE SURGERY       History   Alcohol Use No     History   Drug Use No     History   Smoking Status    Former Smoker    Packs/day: 1 50    Years: 50 00    Quit date: 9/13/2017   Smokeless Tobacco    Never Used     Family History:   Family History   Problem Relation Age of Onset    Heart disease Father        Meds/Allergies   all current active meds have been reviewed, current meds:   Current Facility-Administered Medications   Medication Dose Route Frequency     EMS REPLENISHMENT MED   Does not apply Once    albuterol (PROVENTIL HFA,VENTOLIN HFA) inhaler 2 puff  2 puff Inhalation Q4H PRN    aspirin (ECOTRIN LOW STRENGTH) EC tablet 81 mg  81 mg Oral Daily    budesonide-formoterol (SYMBICORT) 160-4 5 mcg/act inhaler 2 puff  2 puff Inhalation BID    enoxaparin (LOVENOX) subcutaneous injection 40 mg  40 mg Subcutaneous Daily    ferrous sulfate tablet 325 mg  325 mg Oral Daily With Breakfast    fish oil capsule 1,000 mg  1,000 mg Oral BID    fluticasone (FLONASE) 50 mcg/act nasal spray 1 spray  1 spray Nasal Daily    furosemide (LASIX) injection 40 mg  40 mg Intravenous Daily    insulin lispro (HumaLOG) 100 units/mL subcutaneous injection 2-12 Units  2-12 Units Subcutaneous 4x Daily (AC & HS)    insulin regular (HumuLIN R U-500) 500 units/mL CONCENTRATED injection 25 Units  25 Units Subcutaneous Before Dinner    insulin regular (HumuLIN R U-500) 500 units/mL CONCENTRATED injection 45 Units  45 Units Subcutaneous BID before breakfast/lunch    ipratropium (ATROVENT) 0 02 % inhalation solution 0 5 mg  0 5 mg Nebulization Q6H    ipratropium-albuterol (DUO-NEB) 0 5-2 5 mg/3 mL inhalation solution **AcuDose Override Pull**        levalbuterol (XOPENEX) inhalation solution 1 25 mg  1 25 mg Nebulization Q6H    lisinopril (ZESTRIL) tablet 10 mg  10 mg Oral Daily    metoprolol tartrate (LOPRESSOR) tablet 25 mg  25 mg Oral Q12H [address]    oxyCODONE-acetaminophen (PERCOCET) 5-325 mg per tablet 0 5 tablet  0 5 tablet Oral Q4H PRN    pantoprazole (PROTONIX) EC tablet 40 mg  40 mg Oral BID AC    potassium chloride (K-DUR,KLOR-CON) CR tablet 20 mEq  20 mEq Oral BID    pravastatin (PRAVACHOL) tablet 80 mg  80 mg Oral Daily With Dinner    and PTA meds:   Prior to Admission Medications   Prescriptions Last Dose Informant Patient Reported? Taking?    Insulin Syringe/Needle U-500 (BD INSULIN SYRINGE U-500) 31G X 6MM 0 5 ML MISC 2018 at Unknown time  Yes Yes   Sig: by Does not apply route   Iron, Ferrous Sulfate, 142 (45 Fe) MG TBCR 2018 at Unknown time  Yes Yes   Sig: Take by mouth   Omega-3 Fatty Acids (FISH OIL) 645 MG CAPS 2018 at Unknown time  Yes Yes   Sig: Take 1 capsule by mouth 2 (two) times a day   Tiotropium Bromide-Olodaterol (STIOLTO RESPIMAT) 2 5-2 5 MCG/ACT AERS 2018 at Unknown time  Yes Yes   Sig: Inhale   albuterol (2 5 mg/3 mL) 0 083 % nebulizer solution 2018 at Unknown time  Yes Yes   Sig: Inhale 1 each   aspirin (ECOTRIN LOW STRENGTH) 81 mg EC tablet 2018 at Unknown time  Yes Yes   Sig: Take 1 tablet by mouth daily   budesonide (PULMICORT) 0 5 mg/2 mL nebulizer solution 2018 at Unknown time  Yes Yes   Sig: Inhale 1 each 2 (two) times a day   budesonide-formoterol (SYMBICORT) 160-4 5 mcg/act inhaler 2018 at Unknown time  Yes Yes   Sig: Inhale 2 puffs 2 (two) times a day   ferrous sulfate 325 (65 Fe) mg tablet 2018 at Unknown time  Yes Yes   Sig: Take 325 mg by mouth daily with breakfast   fluticasone (FLONASE) 50 mcg/act nasal spray 2018 at Unknown time  No Yes   Si spray into each nostril daily furosemide (LASIX) 40 mg tablet 5/23/2018 at Unknown time  No Yes   Sig: Take 1 tablet by mouth daily   insulin regular (HumuLIN R U-500) 500 units/mL CONCENTRATED injection 5/23/2018 at Unknown time  No Yes   Sig: Inject 0 09 mL (45 Units total) under the skin 2 (two) times a day before breakfast and lunch   insulin regular (HumuLIN R U-500) 500 units/mL CONCENTRATED injection 5/23/2018 at Unknown time  No Yes   Sig: Inject 0 05 mL (25 Units total) under the skin daily before dinner   lisinopril (ZESTRIL) 10 mg tablet 5/23/2018 at Unknown time  Yes Yes   Sig: Take 10 mg by mouth daily Patient unsure of dose of lisinopril    metoprolol tartrate (LOPRESSOR) 25 mg tablet 5/23/2018 at Unknown time  Yes Yes   Sig: Take 1 tablet by mouth 2 (two) times a day   oxyCODONE-acetaminophen (PERCOCET) 7 5-325 MG per tablet 5/23/2018 at Unknown time  Yes Yes   Sig: Take by mouth every 4 (four) hours   pantoprazole (PROTONIX) 40 mg tablet 5/23/2018 at Unknown time  No Yes   Sig: Take 1 tablet by mouth 2 (two) times a day before meals   potassium chloride (K-DUR,KLOR-CON) 20 mEq tablet 5/23/2018 at Unknown time  No Yes   Sig: Take 1 tablet (20 mEq total) by mouth 2 (two) times a day   simvastatin (ZOCOR) 10 mg tablet 5/23/2018 at Unknown time  Yes Yes   Sig: Take 1 tablet by mouth daily   simvastatin (ZOCOR) 40 mg tablet 5/23/2018 at Unknown time  Yes Yes   Sig: Take 40 mg by mouth daily   tiotropium (SPIRIVA) 18 mcg inhalation capsule 5/23/2018 at Unknown time  Yes Yes   Sig: Place 18 mcg into inhaler and inhale daily      Facility-Administered Medications: None          Allergies   Allergen Reactions    Metformin GI Intolerance       Objective   Vitals: Blood pressure 131/65, pulse 90, temperature 97 6 °F (36 4 °C), temperature source Oral, resp  rate 18, height 6' (1 829 m), weight 127 kg (279 lb), SpO2 94 %  ,     Body mass index is 37 84 kg/m²  ,     Systolic (51AZW), YZT:443 , Min:101 , MXO:009     Diastolic (84WZN), LEF:33, Min:54, Max:75            Intake/Output Summary (Last 24 hours) at 05/24/18 0858  Last data filed at 05/24/18 0543   Gross per 24 hour   Intake              920 ml   Output             1775 ml   Net             -855 ml     Weight (last 2 days)     Date/Time   Weight    05/24/18 0603  127 (279)    05/23/18 1243  127 (279)            Invasive Devices     Peripheral Intravenous Line            Peripheral IV 05/23/18 Right Antecubital less than 1 day                  Physical Exam   Constitutional: He is oriented to person, place, and time  No distress  HENT:   Head: Normocephalic and atraumatic  Eyes: Pupils are equal, round, and reactive to light  Neck: Normal range of motion  Neck supple  Cardiovascular: Normal rate, regular rhythm, S1 normal and intact distal pulses  Exam reveals distant heart sounds  Pulmonary/Chest: Effort normal  He has decreased breath sounds  Abdominal: Soft  Bowel sounds are normal    Musculoskeletal: He exhibits edema    + venous stasis changes LE   Neurological: He is alert and oriented to person, place, and time  Skin: Skin is warm and dry  He is not diaphoretic  Psychiatric: He has a normal mood and affect  Nursing note and vitals reviewed          LABORATORY RESULTS:    Results from last 7 days  Lab Units 05/24/18  0500 05/24/18  0045 05/23/18  1252   TROPONIN I ng/mL 0 02 0 03 0 06*     CBC with diff:   Results from last 7 days  Lab Units 05/24/18  0521 05/23/18  2213 05/23/18  1252   WBC Thousand/uL 8 39  --  10 90*   HEMOGLOBIN g/dL 12 4  --  12 9   HEMATOCRIT % 40 9  --  42 6   MCV fL 90  --  90   PLATELETS Thousands/uL 124* 117* 111*   MCH pg 27 2  --  27 2   MCHC g/dL 30 3*  --  30 3*   RDW % 15 2*  --  15 4*   MPV fL 10 1 10 4 10 0   NRBC AUTO /100 WBCs  --   --  0       CMP:  Results from last 7 days  Lab Units 05/24/18  0807 05/24/18  0500 05/23/18  1252   SODIUM mmol/L 131* 132* 138   POTASSIUM mmol/L 5 3 6 0* 4 6   CHLORIDE mmol/L 93* 93* 97*   CO2 mmol/L 32 34* 37*   ANION GAP mmol/L 6 5 4   BUN mg/dL 22 21 22   CREATININE mg/dL 1 43* 1 47* 1 24   GLUCOSE RANDOM mg/dL 433* 428* 154*   CALCIUM mg/dL 8 4 8 3 8 6   AST U/L  --  34 43   ALT U/L  --  36 39   ALK PHOS U/L  --  82 79   TOTAL PROTEIN g/dL  --  7 0 6 7   BILIRUBIN TOTAL mg/dL  --  0 31 0 36   EGFR ml/min/1 73sq m 49 48 59       BMP:  Results from last 7 days  Lab Units 18  0807 18  0500 18  1252   SODIUM mmol/L 131* 132* 138   POTASSIUM mmol/L 5 3 6 0* 4 6   CHLORIDE mmol/L 93* 93* 97*   CO2 mmol/L 32 34* 37*   BUN mg/dL 22 21 22   CREATININE mg/dL 1 43* 1 47* 1 24   GLUCOSE RANDOM mg/dL 433* 428* 154*   CALCIUM mg/dL 8 4 8 3 8 6          Lab Results   Component Value Date    NTBNP 904 (H) 2018    NTBNP 523 (H) 2018    NTBNP 1,580 (H) 2018            Results from last 7 days  Lab Units 18  0500   MAGNESIUM mg/dL 2 6          Results from last 7 days  Lab Units 18  0459   HEMOGLOBIN A1C % 7 9*          Results from last 7 days  Lab Units 18  0500   TSH 3RD GENERATON uIU/mL 0 272*           Lipid Profile:   Lab Results   Component Value Date    CHOL 157 2018    CHOL 147 2017    CHOL 150 2017     Lab Results   Component Value Date    HDL 80 (H) 2018    HDL 63 (H) 2017    HDL 53 2017     Lab Results   Component Value Date    LDLCALC 58 2018    LDLCALC 58 2017    LDLCALC 67 2017     Lab Results   Component Value Date    TRIG 95 2018    TRIG 132 2017    TRIG 150 2017         Cardiac testing:   Results for orders placed during the hospital encounter of 17   Echo complete with contrast if indicated    [facility] Center[facility]  [address]  [phone]    Transthoracic Echocardiogram  2D, M-mode, Doppler, and Color Doppler    Study date:  29-Sep-2017    Patient: Ruth Peña  MR number: [id number]  Account number: [de-identified]  : 1945  Age: 81 years  Gender: Male  Status: Inpatient  Location:  Height: 72 in  Weight: 280 lb  BP: 127/ 67 mmHg    Indications: Shortness of breath  Diagnoses: R06 00 - Dyspnea, unspecified    Sonographer:  JOLIE Powell  Primary Physician:  Karl Gómez MD  Referring Physician:  Alvaro Wiggins MD  Group:  [facility]  Interpreting Physician:  Trey Rebollar MD    SUMMARY    PROCEDURE INFORMATION:  This was a technically difficult study  LEFT VENTRICLE:  Systolic function was vigorous by visual assessment  Ejection fraction was estimated to be 65 %  This study was inadequate for the evaluation of regional wall motion  Doppler parameters were consistent with abnormal left ventricular relaxation (grade 1 diastolic dysfunction)  RIGHT VENTRICLE:  The size was at the upper limits of normal   Systolic function was grossly normal     LEFT ATRIUM:  The atrium was mildly dilated  RIGHT ATRIUM:  The atrium was mildly dilated  HISTORY: PRIOR HISTORY: Dyspnea, CAD s/p MI and stents, DM2, COPD, Dyslipidemia, Obesity    PROCEDURE: This was a routine study  The transthoracic approach was used  The study included complete 2D imaging, M-mode, complete spectral Doppler, and color Doppler  The heart rate was 84 bpm, at the start of the study  This was a  technically difficult study  LEFT VENTRICLE: Size was normal  Systolic function was vigorous by visual assessment  Ejection fraction was estimated to be 65 %  This study was inadequate for the evaluation of regional wall motion  Wall thickness was normal  DOPPLER:  Doppler parameters were consistent with abnormal left ventricular relaxation (grade 1 diastolic dysfunction)  RIGHT VENTRICLE: The size was at the upper limits of normal  Systolic function was grossly normal  Wall thickness was normal     LEFT ATRIUM: The atrium was mildly dilated  RIGHT ATRIUM: The atrium was mildly dilated      MITRAL VALVE: Valve structure was normal  There was normal leaflet separation  DOPPLER: The transmitral velocity was within the normal range  There was no evidence for stenosis  There was no significant regurgitation  AORTIC VALVE: The valve was trileaflet  Leaflets exhibited normal thickness and normal cuspal separation  DOPPLER: Transaortic velocity was within the normal range  There was no evidence for stenosis  There was no significant  regurgitation  TRICUSPID VALVE: The valve structure was normal  There was normal leaflet separation  DOPPLER: The transtricuspid velocity was within the normal range  There was no evidence for stenosis  There was no significant regurgitation  PULMONIC VALVE: Leaflets exhibited normal thickness, no calcification, and normal cuspal separation  DOPPLER: The transpulmonic velocity was within the normal range  There was no significant regurgitation  PERICARDIUM: There was no pericardial effusion  The pericardium was normal in appearance  AORTA: The root exhibited normal size  SYSTEMIC VEINS: IVC: The inferior vena cava was normal in size  SYSTEM MEASUREMENT TABLES    2D  LA Area: 22 73 cm2  LVEDV MOD A4C: 94 25 ml  LVEF MOD A4C: 65 25 %  LVESV MOD A4C: 32 75 ml  LVLd A4C: 8 94 cm  LVLs A4C: 7 21 cm  RA Area: 18 72 cm2  RVIDd: 3 52 cm  SV MOD A4C: 61 5 ml    MM  TAPSE: 2 06 cm    PW  E': 0 14 m/s  E/E': 5 08  MV A Jose: 1 03 m/s  MV Dec Bond: 4 09 m/s2  MV DecT: 177 69 ms  MV E Jose: 0 73 m/s  MV E/A Ratio: 0 71  MV PHT: 51 53 ms  MVA By PHT: 4 27 cm2    [employer] Accredited Echocardiography Laboratory    Prepared and electronically signed by    Justus Walton MD  Signed 30-Sep-2017 14:10:59           Imaging: I have personally reviewed pertinent reports  and I have personally reviewed pertinent films in PACS  X-ray Chest 2 Views  Result Date: 5/23/2018  Narrative: CHEST INDICATION:   chest pain  COMPARISON:  4/27/2018   EXAM PERFORMED/VIEWS:  XR CHEST PA & LATERAL FINDINGS: Cardiomediastinal silhouette is stable  Lungs are clear  Small right pleural effusion is present  Osseous structures appear within normal limits for patient age  Impression: Small right pleural effusion  Workstation performed: [id number]     X-ray Chest 2 Views  Result Date: 4/27/2018X  Narrative: CHEST INDICATION:   Dyspnea  COMPARISON:  February 7, 2018 EXAM PERFORMED/VIEWS:  XR CHEST PA & LATERAL FINDINGS: Stable cardiomegaly  Stable small chronic right effusion  No left-sided effusion  No visible infiltrates  No pneumothorax  Osseous structures appear within normal limits for patient age  Impression: Stable cardiomegaly and right pleural effusion Workstation performed: [id number]     Assessment  Principal Problem:    Shortness of breath - Chronic respiratory failure with hypoxia  Active Problems:    COPD (chronic obstructive pulmonary disease) (HCC)    CAD (coronary artery disease)    Dyslipidemia    Chronic kidney disease, stage 3    Essential hypertension    Insulin dependent diabetes mellitus     Acute on chronic diastolic (congestive) heart failure    NSTEMI type 2     Elevated d-dimer    Urinary retention      Assessment  Acute on Chronic diastolic heart failure, secondary to noncompliance with outpatient diuretics and a low-sodium diet  Home Lasix is 40 mg daily  · Diuresing with Lasix 40 mg IV daily and supplemental potassium  Net -855nl  Wts listed as unchanged, question accuracy  · On a BB and ACE-I  Case mgmt/ VNA consulted and RDN re: low na diet  Chronic hypoxic respiratory failure /severe COPD with FEV1 of 29% predicted on home oxygen  CAD; status post PCI 6 years ago  · On ASA, a statin and a BB  Hypertension  /60  On zestril 10 mg daily, metoprolol tartrate 25 mg q12h, a loop diuretic  CKD 3- 1 4 appears baseline  At baseline  Dyslipidemia- on pravastatin  80 mg/d  Non compliance with a low sodium diet and diuretic-  consulted re  issues   Declan consult Case mgmt for VNA once discharged and an RDN to review the importance of adherence to a low Na diet        Thank you for allowing us to participate in this patient's care  This pt will follow up with Dr Rafa Mahmood  once discharged  Counseling / Coordination of Care  Total floor / unit time spent today 45 minutes  Greater than 50% of total time was spent with the patient and / or family counseling and / or coordination of care  A description of the counseling / coordination of care: Review of history, current assessment, development of a plan  Code Status: Level 1 - Full Code    ** Please Note: Dragon 360 Dictation voice to text software may have been used in the creation of this document   **

## 2018-05-24 NOTE — CASE MANAGEMENT
Notification of Inpatient Admission/Inpatient Authorization Request  This is a Notification of Inpatient Admission/Request for Inpatient Authorization to our facility [facility]  Please be advised that this patient is currently in our facility under Inpatient Status  Below you will find the Attending Physician and Facilitys information including NPI# and contact information for the Utilization  assigned to the [facility] where the patient is receiving services  Please feel free to contact the Utilization Review Department with any questions  Patient Information:  PATIENT NAME: Murtaza Lawrence Sr  MRN: [id number]  YOB: 1945    PRESENTATION DATE: 5/23/2018 12:37 PM  IP ADMISSION DATE: 5/23/18 1514  DISCHARGE DATE: No discharge date for patient encounter  DISPOSITION: Home with [facility]    Attending Physician:  RACHANA Vo  [facility] Practioner ID- [id number]  [address]  Phone 1: [phone]  Fax: [phone]    Facility:  [address] [place] [address] [phone]  NPI: [id number]  TAX ID# [id number]  MEDICARE ID: [id number]    [address] [facility] in the [facility] by Onofre Carroll for 2017  Network Utilization Review Department  Phone: [phone]; Fax [phone]  ATTENTION: The Network Utilization Review Department is now centralized for our 7 Facilities  Make a note that we have a new phone and fax numbers for our Department  Please call with any questions or concerns to [phone] and carefully follow the prompts so that you are directed to the right person  All voicemails are confidential  Fax any determinations, approvals, denials, and requests for initial or continue stay review clinical to [phone]   Due to HIGH CALL volume, it would be easier if you could please send faxed requests to expedite your requests and in part, help us provide discharge notifications faster

## 2018-05-24 NOTE — TELEPHONE ENCOUNTER
Patient seen at [facility] for evaluation of BPH, history of prostate cancer  He was last seen by Dr Onofre Cole in 2016  Please arrange follow up in 2-3 weeks from discharge with Dr Onofre Cole

## 2018-05-24 NOTE — PLAN OF CARE

## 2018-05-24 NOTE — SOCIAL WORK
Heart Failure Care Coordinator: Met with patient to explain role, establish relationship and provide education  Patient is a 30 day readmission, last here 4/27-4/30 with COPD and HF exacerbations  He admits to ongoing non-compliance with diuretics related to urinary symptoms/ urgency, frequency with BPH  He also admits to noncompliance with diet and fluids  Patient has been open with [facility] health since 5/4/18 and their notes also state ongoing non-compliance  I reviewed HF book including diet, daily weight and recording and symptom monitoring  I encouraged patient to decide if he wants to feel better and stay out of the hospital by taking care of himself and to realize he is responsible for his health  He seemed to engage and be somewhat ready to make some changes  He will benefit from resumption of home health RN and [address] [place] [address] Avenue Worker  I did speak with RN HUMA as well

## 2018-05-24 NOTE — SOCIAL WORK
Pt re-admit for CHF  Met with pt and discussed role of CM  Pt lives with his wife in a 3-story home with 1 step at entrance  Pt is independent in ADLs  Pt has DME including: cane  Pt currently open to [facility] and is agreeable with referral for resumption of care  Referral made via [facility]  No MH/D&A tx hx  Preference for pharmacy is [facility]   No POA  Main contact: Wife- Zeeshan Houston ([phone])  CM reviewed d/c planning process including the following: identifying help at home, patient preference for d/c planning needs, Discharge Lounge, [facility] Meds to Bed program, availability of treatment team to discuss questions or concerns patient and/or family may have regarding understanding medications and recognizing signs and symptoms once discharged  CM also encouraged patient to follow up with all recommended appointments after discharge  Patient advised of importance for patient and family to participate in managing patients medical well being

## 2018-05-24 NOTE — ASSESSMENT & PLAN NOTE
· A1C 7 9  · Patient had hypoglycemia yesterday as he did not eat for several hours while in the ED   For this reason, he did not receive his evening dose of Humulin R U-500 last night and is therefore hyperglycemic this morning  · Will continue current regimen with sliding scale insulin and adjust as needed

## 2018-05-24 NOTE — PLAN OF CARE
DISCHARGE PLANNING     Discharge to home or other facility with appropriate resources Progressing        PAIN - ADULT     Verbalizes/displays adequate comfort level or baseline comfort level Progressing        Potential for Falls     Patient will remain free of falls Progressing        Prexisting or High Potential for Compromised Skin Integrity     Skin integrity is maintained or improved Progressing        SAFETY ADULT     Maintain or return to baseline ADL function Progressing     Patient will remain free of falls Progressing

## 2018-05-24 NOTE — CASE MANAGEMENT
Initial Clinical Review    Thank you,  [facility] in the [facility] by Onofre Carroll for 2017  Network Utilization Review Department  Phone: [phone]; Fax [phone]  ATTENTION: The Network Utilization Review Department is now centralized for our 7 Facilities  Make a note that we have a new phone and fax numbers for our Department  Please call with any questions or concerns to [phone] and carefully follow the prompts so that you are directed to the right person  All voicemails are confidential  Fax any determinations, approvals, denials, and requests for initial or continue stay review clinical to [phone]  Due to HIGH CALL volume, it would be easier if you could please send faxed requests to expedite your requests and in part, help us provide discharge notifications faster  Admission: Date/Time/Statement: 5/23/18 @ [id number]     Orders Placed This Encounter   Procedures    Inpatient Admission (expected length of stay for this patient is greater than two midnights)     Standing Status:   Standing     Number of Occurrences:   1     Order Specific Question:   Admitting Physician     Answer:   Eulogio Rivas     Order Specific Question:   Level of Care     Answer:   Med Surg [16]     Order Specific Question:   Estimated length of stay     Answer:   More than 2 Midnights     Order Specific Question:   Certification     Answer:   I certify that inpatient services are medically necessary for this patient for a duration of greater than two midnights  See H&P and MD Progress Notes for additional information about the patient's course of treatment           ED: Date/Time/Mode of Arrival:   ED Arrival Information     Expected Arrival Acuity Means of Arrival Escorted By Service Admission Type    - 5/23/2018 12:36 Emergent Ambulance [facility] Emergency    Arrival Complaint    sob          Chief Complaint:   Chief Complaint   Patient presents with    Shortness of Breath     Patient reports he started yesterday with worsening of his usual SOB, patient states this morning it got worse  Patient states he started this morning with worsening SOB even while at rest along with shaking  Patient has COPD and wears 3 liters nasal cannula at home  Patient reports "a little" CP on the right side of his chest that is nonradiating in nature  History of Illness: Arsh Pfeiffer Sr  is a 81 y o  male who presents with persistent shortness of breath that started this morning upon waking up from sleep  He initially soon was a flare-up of his COPD so he attempted to use his albuterol inhaler which did not give him any relief of symptoms  He did remain on his 3 L of chronic oxygen via nasal cannula  He became anxious and decided to call the ambulance and presented to the ER  Upon my questioning in the ER, he does note that he has been noncompliant with his Lasix at home due to his intermittent urinary retention due to prostate issues  He notes that he was once on Flomax but was stopped and he has no idea why  He also notes that his last PSA check was normal   He notes that he is not always compliant with his low-sodium diet either  He states he stopped smoking last fall  He acknowledges mild worsening of his lower extremity swelling and does state that he has had hyperpigmentation with venous stasis which started approximately 10-15 years ago  A CXR was notable for a small right pleural effusion and he notes that earlier this year he had a left pleural effusion that was drained during a previous hospital course  He denies any other complaints at this time and does state that he generally feels somewhat better than when he 1st arrived  He recalls that he does get intermittent tremors which he states has been chronic for over a year now      ED Vital Signs:   ED Triage Vitals [05/23/18 1243]   Temperature Pulse Respirations Blood Pressure SpO2   98 5 °F (36 9 °C) 105 22 161/58 96 % 3 L NC O2       Temp Source Heart Rate Source Patient Position - Orthostatic VS BP Location FiO2 (%)   Oral Monitor Sitting Right arm --      Pain Score       8        Wt Readings from Last 1 Encounters:   05/24/18 127 kg (279 lb)       Vital Signs (abnormal): O2 Sat  91 - 96 on 3 Liters NC O2    Abnormal Labs/Diagnostic Test Results:      Ref Range & Units 5/24/18 0807 5/24/18 0500 5/23/18 1252   Sodium 136 - 145 mmol/L 131   132   138    Potassium 3 5 - 5 3 mmol/L 5 3  6 0VC, CM   4 6    Chloride 100 - 108 mmol/L 93   93   97     CO2 21 - 32 mmol/L 32  34   37     Anion Gap 4 - 13 mmol/L 6  5  4    BUN 5 - 25 mg/dL 22  21  22    Creatinine 0 60 - 1 30 mg/dL 1 43   1 47CM   1 24CM    Glucose 65 - 140 mg/dL 433   428CM   154CM     Calcium 8 3 - 10 1 mg/dL 8 4  8 3  8 6    eGFR ml/min/1 73sq m 49  48  59             Ref Range & Units 5/24/18 0521 5/23/18 2213 5/23/18 1252   WBC 4 31 - 10 16 Thousand/uL 8 39   10 90     RBC 3 88 - 5 62 Million/uL 4 56   4 74    Hemoglobin 12 0 - 17 0 g/dL 12 4   12 9    Hematocrit 36 5 - 49 3 % 40 9   42 6    MCV 82 - 98 fL 90   90    MCH 26 8 - 34 3 pg 27 2   27 2    MCHC 31 4 - 37 4 g/dL 30 3    30 3     RDW 11 6 - 15 1 % 15 2    15 4     Platelets 361 - 825 Thousands/uL 124   117   111     MPV 8 9 - 12 7 fL 10 1  10 4  10 0            Hg A1C - 7 9/180  Troponin 0 06-0 03-0 02    CXR - right pleural effusion     ECHO   EKG sinus tachycardia  104 beats per minute    Right bundle branch block    ED Treatment:   Medication Administration from 05/23/2018 1236 to 05/23/2018 1737       Date/Time Order Dose Route Action     05/23/2018 1400 albuterol inhalation solution 10 mg 10 mg Nebulization Given     05/23/2018 1400 ipratropium (ATROVENT) 0 02 % inhalation solution 1 mg 1 mg Nebulization Given     05/23/2018 1400 sodium chloride 0 9 % inhalation solution 3 mL 3 mL Nebulization Given     05/23/2018 1329 aspirin tablet 325 mg 325 mg Oral Given     05/23/2018 1511 methylPREDNISolone sodium succinate (Solu-MEDROL) injection 125 mg 125 mg Intravenous Given     05/23/2018 1511 magnesium sulfate 2 g/50 mL IVPB (premix) 2 g 2 g Intravenous New Bag       Past Medical/Surgical History:     Diagnosis    Abdominal pain    Cardiac disease    [facility]    COPD [facility]ch[facility] obstructive pulmonary [facility]    Coronary artery disease    Diabetes mellitus ([place] [address] )    Hyperlipidemia    Hypertension    MI (myocardial infarction) ([place] [address] )    Prostate cancer ([place] [address] )     Past Surgical History:   Procedure Laterality Date    ABDOMINAL SURGERY         exploratory    ANGIOPLASTY         3 stents    ESOPHAGOGASTRODUODENOSCOPY N/A 10/2/2017     Procedure: ESOPHAGOGASTRODUODENOSCOPY (EGD); Surgeon: Marily Reyes MD;  Location: [facility]; Service: Gastroenterology    PROSTATE SURGERY           Admitting Diagnosis: Shortness of breath [R06 02]  SOB (shortness of breath) [R06 02]  COPD exacerbation ([facility]) [J44 1]  Acute on chronic diastolic (congestive) heart failure ([facility]) [I50 33]  Type 2 myocardial infarction ([place] [address] ) [A66  A1]    Age/Sex: 81 y o  male    Assessment/Plan:   1  Shortness of breath - Chronic respiratory failure with hypoxia  · likely secondary to noncompliance to Lasix - CXR did reveal a small right pleural effusion and patient notes a prior recent history of a left pleural effusion that was drained earlier this year  · remains on baseline 3 L of oxygen via nasal cannula   · states he has had an outpatient sleep study but is not aware of results - may have underlying obstructive sleep apnea     2    Acute on chronic diastolic CHF  · last EF of 65% with grade-1 diastolic dysfunction in September 2017 - check updated echocardiogram  · patient notes noncompliance with home Lasix due to intermittent urinary retention from prior prostate issues (see plan for urinary retention below)  · initiated IV Lasix regimen - continue beta-blockade with Lopressor - monitor I/Os and daily weights  · ProBNP elevated at 904 on admission today  · Maintain oxygenation - currently on baseline 3 L via nasal cannula     3  NSTEMI type 2  · Initial troponin slightly elevated 0 06 - trend serial sets - denies chest pain  · Likely secondary to CHF exacerbation in the setting of chronic kidney disease     4  CAD  · Status post prior stenting - lifestyle/diet modifications encouraged  · Continue ASA/Lopressor/Zestril/statin     5  Insulin-dependent diabetes mellitus  · Last HgA1c of 6 8 in November 2017   · c/w Humulin-R prior to meals w/ additional SSI coverage per accuchecks - monitor for hypoglycemia     6   Elevated D-dimer  · Possibly nonspecific finding although in light of shortness of breath will check a V/Q scan (chronic kidney disease noted) and bilateral LE venous dopplers (lower extremity swelling associated with mild tenderness to palpation)      7  COPD  · Chronic condition - remains at baseline 3 L of oxygen via nasal cannula  · Continue Symbicort regimen with DuoNeb breathing treatments  · Continue to encourage smoking abstinence     8  Chronic kidney disease stage 3  · Baseline creatinine approximately 1 2-1 4 - remains at baseline on admission  · Monitor renal function and limit nephrotoxins if possible     9   Essential hypertension  · Low-sodium diet encouraged - continue Lopressor/Zestril     10  Dyslipidemia  · c/w ASA/statin     11    Urinary retention  · patient has been noncompliant with his Lasix due to persistent urinary retention as he notes he has had prostate issues in the past but also states that his most recent PSA check was unremarkable - notes he has been on Flomax in the past but it was stopped - will appreciate urology evaluation        DVT Prophylaxis:   Lovenox       Admission Orders:  Scheduled Meds:   Current Facility-Administered Medications:  EMS replenish medication  Does not apply Once    albuterol 2 puff Inhalation Q4H PRN    aspirin 81 mg Oral Daily    budesonide-formoterol 2 puff Inhalation BID    enoxaparin 40 mg Subcutaneous Daily    ferrous sulfate 325 mg Oral Daily With Breakfast    fish oil 1,000 mg Oral BID    fluticasone 1 spray Nasal Daily    furosemide 40 mg Intravenous Daily    insulin lispro 2-12 Units Subcutaneous 4x Daily (AC & HS)    insulin regular 25 Units Subcutaneous Before Dinner    insulin regular 45 Units Subcutaneous BID before breakfast/lunch    ipratropium 0 5 mg Nebulization Q6H    ipratropium-albuterol       levalbuterol 1 25 mg Nebulization Q6H    lisinopril 10 mg Oral Daily    metoprolol tartrate 25 mg Oral Q12H [address]    oxyCODONE-acetaminophen 0 5 tablet Oral Q4H PRN 5/24 x 1    pantoprazole 40 mg Oral BID AC    potassium chloride 20 mEq Oral BID    pravastatin 80 mg Oral Daily With Dinner    tamsulosin 0 4 mg Oral Daily With Dinner      Nursing Orders - Telem - VS q 4 - Daily weights - I & O q shift - diet cons carb - 2 3 gm NA - fluid restriction 1500 ml    Cardiology - He presented to [facility] last evening with dyspnea and orthopnea  He tried his albuterol inhaler which did not help  Notably he admits to noncompliance with his home Lasix due to urinary retention related to prostate problems, as well as non adherence to a low-sodium diet  In the past he was on Flomax this was stopped but unclear why  Data  EKG sinus tachycardia  104 beats per minute  Right bundle branch block-similar prior  Tele: SR   Troponin 0 06, 0 03, 0 02  NT proBNP 904  Creatinine 1 24, BUN 22  Chest x-ray:  Small right pleural effusion  TSH 0 27  Hemoglobin A1c 7 9  Lower extremity duplex:  Pending  V/Q study:  Pending     He was diagnosed with acute on chronic diastolic heart failure by the primary team   He was started on IV Lasix  Urine output is 1 7 L thus far  He is net -8 5 5 mL    Due to his  issues and reason for noncompliance with his diuretic therapy,, a urology consultation was requested  A current echocardiogram has been requested      Urology  -   PLAN:   -Discussed with patient that based on symptomology, most likely due to BPH  Will start tamsulosin 0 4mg daily  Reviewed all potential side effects of medication including lightheadedness/dizziness/retrograde ejaculation  -Will arrange follow up in outpatient setting to assess urinary symptoms, BPH, and ED    Would recommend obtaining PSA outpatient, prior to office visit

## 2018-05-24 NOTE — PLAN OF CARE
Problem: DISCHARGE PLANNING - CARE MANAGEMENT  Goal: Discharge to post-acute care or home with appropriate resources  INTERVENTIONS:  - Conduct assessment to determine patient/family and health care team treatment goals, and need for post-acute services based on payer coverage, community resources, and patient preferences, and barriers to discharge  - Address psychosocial, clinical, and financial barriers to discharge as identified in assessment in conjunction with the patient/family and health care team  - Arrange appropriate level of post-acute services according to patient's   needs and preference and payer coverage in collaboration with the physician and health care team  - Communicate with and update the patient/family, physician, and health care team regarding progress on the discharge plan  - Arrange appropriate transportation to post-acute venues  -Anticipate return to home with [facility] once medically stable  Outcome: Progressing

## 2018-05-24 NOTE — SOCIAL WORK
HRR / OP CM:     Patient identified as HRR per criteria  Call made to DC appointment hotline with information as required for CM support follow up  CM left vm on OP CM Hotline  CM received email; pt is nonSLPG and will not be enrolled with OP CM

## 2018-05-24 NOTE — PROGRESS NOTES
Progress Note - Blake Quiles Sr  1945, 81 y o  male MRN: [id number]    Unit/Bed#: [facility] 606-01 Encounter: [id number]    Primary Care Provider: Kalyn Quinonez MD   Date and time admitted to hospital: 5/23/2018 12:37 PM    * Acute on chronic diastolic (congestive) heart failure   Assessment & Plan    · Due to noncompliance with Lasix at home (due to urinary sxs)  · Continue IV Lasix  · I&Os, daily weights  · Echo pending  · Cardiology following  Appreciate recs        Hyponatremia   Assessment & Plan    · Suspect due to volume overload  · Monitor with diuresis  Check sodium studies if not improving        Urinary frequency   Assessment & Plan    · Seen by urology and started on Flomax  · F/U with urology on discharge        CAD (coronary artery disease)   Assessment & Plan    · s/p PCI  · Continue ASA, BB, statin        Elevated d-dimer   Assessment & Plan    · Venous duplex of lower extremities negative  · VQ scan pending although doubt PE with negative ultrasound        Insulin dependent diabetes mellitus    Assessment & Plan    · A1C 7 9  · Patient had hypoglycemia yesterday as he did not eat for several hours while in the ED  For this reason, he did not receive his evening dose of Humulin R U-500 last night and is therefore hyperglycemic this morning  · Will continue current regimen with sliding scale insulin and adjust as needed        Chronic kidney disease, stage 3   Assessment & Plan    · Creatinine at baseline- monitor        COPD (chronic obstructive pulmonary disease) (HCC)   Assessment & Plan    · With chronic hypoxic respiratory failure   · At baseline oxygen requirement, 3L O2 around the clock  · No acute exacerbation  · Continue Symbicort, nebs          VTE Pharmacologic Prophylaxis:   Pharmacologic: Enoxaparin (Lovenox)  Mechanical VTE Prophylaxis in Place: Yes    Patient Centered Rounds: I have performed bedside rounds with nursing staff today      Discussions with Specialists or Other Care Team Provider:      Education and Discussions with Family / Patient: Patient    Time Spent for Care: 30 minutes  More than 50% of total time spent on counseling and coordination of care as described above  Current Length of Stay: 1 day(s)    Current Patient Status: Inpatient   Certification Statement: The patient will continue to require additional inpatient hospital stay due to IV diuresis    Discharge Plan: When able to switch to po diuretics  Hopefully 24-48 hours  Code Status: Level 1 - Full Code      Subjective:   Reports feeling generalized weakness today and dyspnea with exertion  No dyspnea at rest or chest pain  On 3L O2 which is his baseline  Objective:     Vitals:   Temp (24hrs), Av 4 °F (36 9 °C), Min:97 6 °F (36 4 °C), Max:98 7 °F (37 1 °C)    HR:  [] 90  Resp:  [18-24] 18  BP: (101-152)/(54-75) 131/65  SpO2:  [91 %-99 %] 95 %  Body mass index is 37 84 kg/m²  Input and Output Summary (last 24 hours): Intake/Output Summary (Last 24 hours) at 18 1323  Last data filed at 18 1301   Gross per 24 hour   Intake             1040 ml   Output             3100 ml   Net            -2060 ml       Physical Exam:     Physical Exam   Constitutional: He is oriented to person, place, and time  Obese   HENT:   Head: Normocephalic and atraumatic  Eyes: No scleral icterus  Neck: Normal range of motion  Neck supple  Cardiovascular: Normal rate, regular rhythm and normal heart sounds  +pitting edema   Pulmonary/Chest:   Decreased breath sounds  Musculoskeletal: Normal range of motion  Neurological: He is alert and oriented to person, place, and time  Skin: Skin is warm and dry  Venous stasis changes lower extremities   Psychiatric: He has a normal mood and affect   His behavior is normal  Thought content normal        Additional Data:     Labs:      Results from last 7 days  Lab Units 18  0521  18  1252   WBC Thousand/uL 8 39  --  10 90*   HEMOGLOBIN g/dL 12 4  --  12 9   HEMATOCRIT % 40 9  --  42 6   PLATELETS Thousands/uL 124*  < > 111*   NEUTROS PCT %  --   --  86*   LYMPHS PCT %  --   --  7*   MONOS PCT %  --   --  5   EOS PCT %  --   --  1   < > = values in this interval not displayed  Results from last 7 days  Lab Units 05/24/18  0807 05/24/18  0500   SODIUM mmol/L 131* 132*   POTASSIUM mmol/L 5 3 6 0*   CHLORIDE mmol/L 93* 93*   CO2 mmol/L 32 34*   BUN mg/dL 22 21   CREATININE mg/dL 1 43* 1 47*   CALCIUM mg/dL 8 4 8 3   TOTAL PROTEIN g/dL  --  7 0   BILIRUBIN TOTAL mg/dL  --  0 31   ALK PHOS U/L  --  82   ALT U/L  --  36   AST U/L  --  34   GLUCOSE RANDOM mg/dL 433* 428*           * I Have Reviewed All Lab Data Listed Above  * Additional Pertinent Lab Tests Reviewed:  All Labs Within Last 24 Hours Reviewed    Imaging:    Imaging Reports Reviewed Today Include: Chest x-ray- small right pleural effusion, venous duplex- No DVT of lower extremities  Imaging Personally Reviewed by Myself Includes:  None    Recent Cultures (last 7 days):           Last 24 Hours Medication List:     Current Facility-Administered Medications:  EMS replenish medication  Does not apply Once Fritz Treadwell MD   albuterol 2 puff Inhalation Q4H PRN Jazmin Bledsoe MD   aspirin 81 mg Oral Daily Jazmin Bledsoe MD   budesonide-formoterol 2 puff Inhalation BID Jazmin Bledsoe MD   enoxaparin 40 mg Subcutaneous Daily Jazmin Bledsoe MD   ferrous sulfate 325 mg Oral Daily With Breakfast Jazmin Bledsoe MD   fish oil 1,000 mg Oral BID Jazmin Bledsoe MD   fluticasone 1 spray Nasal Daily Jazmin Bledsoe MD   furosemide 40 mg Intravenous BID (diuretic) Wendy Rodriguez DO   insulin lispro 2-12 Units Subcutaneous 4x Daily (AC & HS) Jazmin Bledsoe MD   insulin regular 25 Units Subcutaneous Before Otto Scriver, MD   insulin regular 45 Units Subcutaneous BID before breakfast/lunch Jazmin Bledsoe MD   ipratropium 0 5 mg Nebulization Q6H Jazmin Bledsoe MD   levalbuterol 1 25 mg Nebulization Q6H Addie Peña Valeria Nova MD   lisinopril 10 mg Oral Daily Daniel Gorman MD   metoprolol tartrate 25 mg Oral Q12H [address] Daniel Gorman MD   oxyCODONE-acetaminophen 0 5 tablet Oral Q4H PRN Daniel Gorman MD   pantoprazole 40 mg Oral BID AC Daniel Gorman MD   potassium chloride 20 mEq Oral BID Daniel Gorman MD   pravastatin 80 mg Oral Daily With Burgess Lee MD   tamsulosin 0 4 mg Oral Daily With YOLANDE Paredes        Today, Patient Was Seen By: Alyce Caputo PA-C    ** Please Note: Dragon 360 Dictation voice to text software may have been used in the creation of this document   **

## 2018-05-24 NOTE — ASSESSMENT & PLAN NOTE
· Due to noncompliance with Lasix at home (due to urinary sxs)  · Continue IV Lasix  · I&Os, daily weights  · Echo pending  · Cardiology following   Appreciate recs

## 2018-05-24 NOTE — ASSESSMENT & PLAN NOTE
· With chronic hypoxic respiratory failure   · At baseline oxygen requirement, 3L O2 around the clock  · No acute exacerbation  · Continue Symbicort, nebs

## 2018-05-24 NOTE — DISCHARGE INSTRUCTIONS
Take your medications as directed, and keep your follow up appointments  Adhere to a heart healthy lifestyle, maintaining a low sodium diet  Daily weight and record  If your weight increases 2-3 lbs in one day, or 5 lbs in 2 days, you are short of breath or have lower extremity swelling, please call the heart failure team at  [facility] at [phone]

## 2018-05-24 NOTE — CONSULTS
UROLOGY CONSULTATION NOTE     Patient Identifiers: Ankur Bridges (MRN [id number])  Service Requesting Consultation: Urinary retention  Service Providing Consultation:  Urology, YOLANDE Marinelli    Date of Service: 5/24/2018  Inpatient consult to Urology  Consult performed by: Mary Kay Carter ordered by: Ronald Lester          Reason for Consultation: SLIM    ASSESSMENT:     81 y o  old male with prostate cancer and BPH with lower urinary tract symptoms  PLAN:   -Discussed with patient that based on symptomology, most likely due to BPH  Will start tamsulosin 0 4mg daily  Reviewed all potential side effects of medication including lightheadedness/dizziness/retrograde ejaculation  -Will arrange follow up in outpatient setting to assess urinary symptoms, BPH, and ED  Would recommend obtaining PSA outpatient, prior to office visit    Urology signing off, please do not hesitate to call with any questions/concerns      History of Present Illness:     Ankur Bridges is a 81 y o  old male who initially presented to emergency department 04/27/2018 for complaints of shortness of breath  Patient's PMH includes, COPD, CHF, mi, DM, HTN, HLD, and prostate cancer  Patient last seen in our office in 2016 by Dr Roseline Sanchez  He was previously known to Dr Shania Resendiz  He has a history of prostate cancer diagnosed approximately 9 years ago when he underwent brachii therapy seed implantation and external beam radiation therapy  Patient had full hematuria workup on 12/23/2016 for painless gross hematuria  Cystoscopy and upper tract imaging was negative for any significant findings  Last noted PSA from 08/2017 was <0 1  Patient states having worsening urinary frequency, urgency, and weak stream over the last several years  He states not taking Lasix recently, due to medication exacerbating urinary symptoms  He states being on flomax several years ago, but has not taken since    Patient denies any gross hematuria or dysuria  Patient states he does not suffer from urinary retention, and feels he empties bladder to completion  Of note, patient also mentions having erectile dysfunction since radiation therapy, and issues with buried penis  Patient's PCP continues to manage his urinary symptoms and checking PSA  Past Medical, Past Surgical History:     Past Medical History:   Diagnosis Date    Abdominal pain     Cardiac disease     CHF (congestive heart failure) ([place] [address] )     COPD (chronic obstructive pulmonary disease) (HCC)     Coronary artery disease     Diabetes mellitus ([place] [address] )     Hyperlipidemia     Hypertension     MI (myocardial infarction) ([place] [address] )     with 3 stents    Prostate cancer ([place] [address] )    :    Past Surgical History:   Procedure Laterality Date    ABDOMINAL SURGERY      exploratory    ANGIOPLASTY      3 stents    ESOPHAGOGASTRODUODENOSCOPY N/A 10/2/2017    Procedure: ESOPHAGOGASTRODUODENOSCOPY (EGD); Surgeon: Brock Butts MD;  Location: [facility];   Service: Gastroenterology    PROSTATE SURGERY     :    Medications, Allergies:     Current Facility-Administered Medications   Medication Dose Route Frequency     EMS REPLENISHMENT MED   Does not apply Once    albuterol (PROVENTIL HFA,VENTOLIN HFA) inhaler 2 puff  2 puff Inhalation Q4H PRN    aspirin (ECOTRIN LOW STRENGTH) EC tablet 81 mg  81 mg Oral Daily    budesonide-formoterol (SYMBICORT) 160-4 5 mcg/act inhaler 2 puff  2 puff Inhalation BID    enoxaparin (LOVENOX) subcutaneous injection 40 mg  40 mg Subcutaneous Daily    ferrous sulfate tablet 325 mg  325 mg Oral Daily With Breakfast    fish oil capsule 1,000 mg  1,000 mg Oral BID    fluticasone (FLONASE) 50 mcg/act nasal spray 1 spray  1 spray Nasal Daily    furosemide (LASIX) injection 40 mg  40 mg Intravenous Daily    insulin lispro (HumaLOG) 100 units/mL subcutaneous injection 2-12 Units  2-12 Units Subcutaneous 4x Daily (AC & HS)    insulin regular (HumuLIN R U-500) 500 units/mL CONCENTRATED injection 25 Units  25 Units Subcutaneous Before Dinner    insulin regular (HumuLIN R U-500) 500 units/mL CONCENTRATED injection 45 Units  45 Units Subcutaneous BID before breakfast/lunch    ipratropium (ATROVENT) 0 02 % inhalation solution 0 5 mg  0 5 mg Nebulization Q6H    ipratropium-albuterol (DUO-NEB) 0 5-2 5 mg/3 mL inhalation solution **AcuDose Override Pull**        levalbuterol (XOPENEX) inhalation solution 1 25 mg  1 25 mg Nebulization Q6H    lisinopril (ZESTRIL) tablet 10 mg  10 mg Oral Daily    metoprolol tartrate (LOPRESSOR) tablet 25 mg  25 mg Oral Q12H [address]    oxyCODONE-acetaminophen (PERCOCET) 5-325 mg per tablet 0 5 tablet  0 5 tablet Oral Q4H PRN    pantoprazole (PROTONIX) EC tablet 40 mg  40 mg Oral BID AC    potassium chloride (K-DUR,KLOR-CON) CR tablet 20 mEq  20 mEq Oral BID    pravastatin (PRAVACHOL) tablet 80 mg  80 mg Oral Daily With Dinner       Allergies: Allergies   Allergen Reactions    Metformin GI Intolerance   :    Social and Family History:   Social History:   Social History   Substance Use Topics    Smoking status: Former Smoker     Packs/day: 1 50     Years: 50 00     Quit date: 9/13/2017    Smokeless tobacco: Never Used    Alcohol use No        History   Smoking Status    Former Smoker    Packs/day: 1 50    Years: 50 00    Quit date: 9/13/2017   Smokeless Tobacco    Never Used       Family History:  Family History   Problem Relation Age of Onset    Heart disease Father    :     Review of Systems:     General: Fever, chills, or night sweats: negative  Cardiac: Negative for chest pain  Pulmonary: Negative for shortness of breath  Gastrointestinal: Abdominal pain negative  Nausea, vomiting, or diarrhea negative,  Genitourinary: See HPI above  Patient does not have hematuria  All other systems queried were negative  Physical Exam:   General: Patient is pleasant and in NAD   Awake and alert  /65 (BP Location: Left arm)   Pulse 90   Temp 97 6 °F (36 4 °C) (Oral)   Resp 18   Ht 6' (1 829 m)   Wt 127 kg (279 lb)   SpO2 94%   BMI 37 84 kg/m² Temp (24hrs), Av 4 °F (36 9 °C), Min:97 6 °F (36 4 °C), Max:98 7 °F (37 1 °C)  current; Temperature: 97 6 °F (36 4 °C)  I/O last 24 hours: In: 928 [P O :920]  Out: 0385 [Urine:1775]  Skin: warm, dry, intact  Cardiac: S1S2, HRR, Peripheral edema: negative  Pulmonary: Non-labored breathing  Abdomen: Soft, non-tender, non-distended  No surgical scars  No masses, tenderness, hernias noted  Musculoskeletal: AROM with no joint deformity or tenderness  Neurology: alert, oriented x3, affect appropriate, no focal neurological deficits, moves all extremities well, no involuntary movements and reflexes at knee and ankle intact  Genitourinary: Negative CVA tenderness, negative suprapubic tenderness  Labs:     Lab Results   Component Value Date    HGB 12 4 2018    HCT 40 9 2018    WBC 8 39 2018     (L) 2018   ]    Lab Results   Component Value Date     (L) 2018    K 6 0 (H) 2018    CL 93 (L) 2018    CO2 34 (H) 2018    BUN 21 2018    CREATININE 1 47 (H) 2018    CALCIUM 8 3 2018    GLUCOSE 428 (H) 2018   ]        Thank you for allowing me to participate in this patients care  Please do not hesitate to call with any additional questions    YOLANDE Madsen

## 2018-05-24 NOTE — RESTORATIVE TECHNICIAN NOTE
Restorative Specialist Mobility Note       Activity: Ambulate in gomez, Ambulate in room, Bathroom privileges, Stand at bedside, Dangle (Educated/encouraged pt to ambulate with assistance 3-4 x's/day   Assisted pt to the wheelchair post ambulation )     Assistive Device: None, Wheelchair (NC O2 on 3L)       Ann PINEDA, Restorative Technician, [employer]

## 2018-05-25 ENCOUNTER — APPOINTMENT (INPATIENT)
Dept: NON INVASIVE DIAGNOSTICS | Facility: HOSPITAL | Age: 73
DRG: 280 | End: 2018-05-25
Attending: INTERNAL MEDICINE
Payer: COMMERCIAL

## 2018-05-25 PROBLEM — I21.A1 TYPE 2 MYOCARDIAL INFARCTION (HCC): Status: RESOLVED | Noted: 2018-05-23 | Resolved: 2018-05-25

## 2018-05-25 LAB
ANION GAP SERPL CALCULATED.3IONS-SCNC: 5 MMOL/L (ref 4–13)
BUN SERPL-MCNC: 31 MG/DL (ref 5–25)
CALCIUM SERPL-MCNC: 8.4 MG/DL (ref 8.3–10.1)
CHLORIDE SERPL-SCNC: 94 MMOL/L (ref 100–108)
CO2 SERPL-SCNC: 39 MMOL/L (ref 21–32)
CREAT SERPL-MCNC: 1.47 MG/DL (ref 0.6–1.3)
GFR SERPL CREATININE-BSD FRML MDRD: 48 ML/MIN/1.73SQ M
GLUCOSE SERPL-MCNC: 105 MG/DL (ref 65–140)
GLUCOSE SERPL-MCNC: 163 MG/DL (ref 65–140)
GLUCOSE SERPL-MCNC: 176 MG/DL (ref 65–140)
GLUCOSE SERPL-MCNC: 200 MG/DL (ref 65–140)
GLUCOSE SERPL-MCNC: 241 MG/DL (ref 65–140)
POTASSIUM SERPL-SCNC: 4.1 MMOL/L (ref 3.5–5.3)
SODIUM SERPL-SCNC: 138 MMOL/L (ref 136–145)
T4 FREE SERPL-MCNC: 0.82 NG/DL (ref 0.76–1.46)

## 2018-05-25 PROCEDURE — 84439 ASSAY OF FREE THYROXINE: CPT | Performed by: PHYSICIAN ASSISTANT

## 2018-05-25 PROCEDURE — 94760 N-INVAS EAR/PLS OXIMETRY 1: CPT

## 2018-05-25 PROCEDURE — 99232 SBSQ HOSP IP/OBS MODERATE 35: CPT | Performed by: INTERNAL MEDICINE

## 2018-05-25 PROCEDURE — 94640 AIRWAY INHALATION TREATMENT: CPT

## 2018-05-25 PROCEDURE — 93306 TTE W/DOPPLER COMPLETE: CPT

## 2018-05-25 PROCEDURE — 80048 BASIC METABOLIC PNL TOTAL CA: CPT | Performed by: PHYSICIAN ASSISTANT

## 2018-05-25 PROCEDURE — 93306 TTE W/DOPPLER COMPLETE: CPT | Performed by: INTERNAL MEDICINE

## 2018-05-25 PROCEDURE — 82948 REAGENT STRIP/BLOOD GLUCOSE: CPT

## 2018-05-25 RX ORDER — HEPARIN SODIUM 5000 [USP'U]/ML
5000 INJECTION, SOLUTION INTRAVENOUS; SUBCUTANEOUS EVERY 8 HOURS SCHEDULED
Status: DISCONTINUED | OUTPATIENT
Start: 2018-05-26 | End: 2018-05-26 | Stop reason: HOSPADM

## 2018-05-25 RX ADMIN — POTASSIUM CHLORIDE 20 MEQ: 1500 TABLET, EXTENDED RELEASE ORAL at 09:38

## 2018-05-25 RX ADMIN — OXYCODONE HYDROCHLORIDE AND ACETAMINOPHEN 0.5 TABLET: 5; 325 TABLET ORAL at 06:28

## 2018-05-25 RX ADMIN — ASPIRIN 81 MG: 81 TABLET, COATED ORAL at 09:38

## 2018-05-25 RX ADMIN — METOPROLOL TARTRATE 25 MG: 25 TABLET, FILM COATED ORAL at 21:57

## 2018-05-25 RX ADMIN — OXYCODONE HYDROCHLORIDE AND ACETAMINOPHEN 0.5 TABLET: 5; 325 TABLET ORAL at 17:52

## 2018-05-25 RX ADMIN — FUROSEMIDE 40 MG: 10 INJECTION, SOLUTION INTRAMUSCULAR; INTRAVENOUS at 17:52

## 2018-05-25 RX ADMIN — INSULIN HUMAN 45 UNITS: 500 INJECTION, SOLUTION SUBCUTANEOUS at 13:35

## 2018-05-25 RX ADMIN — PANTOPRAZOLE SODIUM 40 MG: 40 TABLET, DELAYED RELEASE ORAL at 06:28

## 2018-05-25 RX ADMIN — PRAVASTATIN SODIUM 80 MG: 80 TABLET ORAL at 17:51

## 2018-05-25 RX ADMIN — ENOXAPARIN SODIUM 40 MG: 40 INJECTION SUBCUTANEOUS at 09:39

## 2018-05-25 RX ADMIN — LEVALBUTEROL HYDROCHLORIDE 1.25 MG: 1.25 SOLUTION, CONCENTRATE RESPIRATORY (INHALATION) at 08:47

## 2018-05-25 RX ADMIN — BUDESONIDE AND FORMOTEROL FUMARATE DIHYDRATE 2 PUFF: 160; 4.5 AEROSOL RESPIRATORY (INHALATION) at 09:37

## 2018-05-25 RX ADMIN — IPRATROPIUM BROMIDE 0.5 MG: 0.5 SOLUTION RESPIRATORY (INHALATION) at 00:37

## 2018-05-25 RX ADMIN — LEVALBUTEROL HYDROCHLORIDE 1.25 MG: 1.25 SOLUTION, CONCENTRATE RESPIRATORY (INHALATION) at 14:05

## 2018-05-25 RX ADMIN — INSULIN LISPRO 4 UNITS: 100 INJECTION, SOLUTION INTRAVENOUS; SUBCUTANEOUS at 09:40

## 2018-05-25 RX ADMIN — Medication 1000 MG: at 17:51

## 2018-05-25 RX ADMIN — IPRATROPIUM BROMIDE 0.5 MG: 0.5 SOLUTION RESPIRATORY (INHALATION) at 19:43

## 2018-05-25 RX ADMIN — LEVALBUTEROL HYDROCHLORIDE 1.25 MG: 1.25 SOLUTION, CONCENTRATE RESPIRATORY (INHALATION) at 00:37

## 2018-05-25 RX ADMIN — METOPROLOL TARTRATE 25 MG: 25 TABLET, FILM COATED ORAL at 09:39

## 2018-05-25 RX ADMIN — BUDESONIDE AND FORMOTEROL FUMARATE DIHYDRATE 2 PUFF: 160; 4.5 AEROSOL RESPIRATORY (INHALATION) at 17:52

## 2018-05-25 RX ADMIN — FLUTICASONE PROPIONATE 1 SPRAY: 50 SPRAY, METERED NASAL at 09:38

## 2018-05-25 RX ADMIN — INSULIN LISPRO 2 UNITS: 100 INJECTION, SOLUTION INTRAVENOUS; SUBCUTANEOUS at 21:57

## 2018-05-25 RX ADMIN — INSULIN HUMAN 25 UNITS: 500 INJECTION, SOLUTION SUBCUTANEOUS at 17:56

## 2018-05-25 RX ADMIN — OXYCODONE HYDROCHLORIDE AND ACETAMINOPHEN 0.5 TABLET: 5; 325 TABLET ORAL at 21:57

## 2018-05-25 RX ADMIN — IPRATROPIUM BROMIDE 0.5 MG: 0.5 SOLUTION RESPIRATORY (INHALATION) at 14:05

## 2018-05-25 RX ADMIN — INSULIN LISPRO 4 UNITS: 100 INJECTION, SOLUTION INTRAVENOUS; SUBCUTANEOUS at 13:35

## 2018-05-25 RX ADMIN — PANTOPRAZOLE SODIUM 40 MG: 40 TABLET, DELAYED RELEASE ORAL at 17:51

## 2018-05-25 RX ADMIN — LISINOPRIL 10 MG: 10 TABLET ORAL at 09:38

## 2018-05-25 RX ADMIN — TAMSULOSIN HYDROCHLORIDE 0.4 MG: 0.4 CAPSULE ORAL at 17:55

## 2018-05-25 RX ADMIN — INSULIN HUMAN 45 UNITS: 500 INJECTION, SOLUTION SUBCUTANEOUS at 09:42

## 2018-05-25 RX ADMIN — Medication 1000 MG: at 09:38

## 2018-05-25 RX ADMIN — POTASSIUM CHLORIDE 20 MEQ: 1500 TABLET, EXTENDED RELEASE ORAL at 17:51

## 2018-05-25 RX ADMIN — LEVALBUTEROL HYDROCHLORIDE 1.25 MG: 1.25 SOLUTION, CONCENTRATE RESPIRATORY (INHALATION) at 19:43

## 2018-05-25 RX ADMIN — FUROSEMIDE 40 MG: 10 INJECTION, SOLUTION INTRAMUSCULAR; INTRAVENOUS at 09:38

## 2018-05-25 RX ADMIN — Medication 325 MG: at 09:38

## 2018-05-25 RX ADMIN — IPRATROPIUM BROMIDE 0.5 MG: 0.5 SOLUTION RESPIRATORY (INHALATION) at 08:47

## 2018-05-25 NOTE — PROGRESS NOTES
[facility] Progress Note  Patient: Quinton Hilario  81 y o  male   MRN: [id number]  PCP: Rik Cameron MD  [facility]counter: [id number]  Date Of Visit: 05/25/18    Assessment:    Principal Problem:    Acute on chronic diastolic (congestive) heart failure  Active Problems:    Dyslipidemia    COPD (chronic obstructive pulmonary disease) (HCC)    Chronic kidney disease, stage 3    Shortness of breath - Chronic respiratory failure with hypoxia    Essential hypertension    Insulin dependent diabetes mellitus     NSTEMI type 2     Elevated d-dimer    CAD (coronary artery disease)    Urinary frequency    Hyponatremia      Plan:    1  Acute on chronic diastolic CHF, cardiac echo with EF 65%, negative fluid balance,  per cardiology continue with IV Lasix  2  Abnormal V/Q scan, suspect secondary to underlying COPD, D-dimer 522, negative lower extremity venous Doppler, less likely PE   3  COPD with chronic hypoxic respiratory failure , home oxygen 3L/min, continue Symbicort  4  Chronic disease stage III, stable, monitor  5  Diabetes mellitus type 2 with A1c 7 9, monitor for hypoglycemia, continue current dose of Humulin R  6  BPH with urinary frequency, per Urology, continue with Flomax, outpatient follow-up  7  Hyponatremia, resolved,  continue to monitor  8  CAD status post PCI, continue aspirin, statin and beta-blocker  9  Hypertension, acceptable blood pressure, continue lisinopril and Lopressor           VTE Pharmacologic Prophylaxis:   Pharmacologic: Heparin  Mechanical VTE Prophylaxis in Place: Yes    Patient Centered Rounds: I have performed bedside rounds with nursing staff today  Discussions with Specialists or Other Care Team Provider:     Education and Discussions with Family / Patient:  Patient    Time Spent for Care: 30 minutes  More than 50% of total time spent on counseling and coordination of care as described above      Current Length of Stay: 2 day(s)    Current Patient Status: Inpatient   Certification Statement: The patient will continue to require additional inpatient hospital stay due to Management of CHF exacerbation    Discharge Plan / Estimated Discharge Date:  Possible home tomorrow if cleared by Cardiology    Code Status: Level 1 - Full Code      Subjective:   Patient seen and examined  He feels better  Less short of breath    Objective:     Vitals:   Temp (24hrs), Av 2 °F (36 8 °C), Min:97 6 °F (36 4 °C), Max:98 6 °F (37 °C)    HR:  [74-90] 74  Resp:  [20] 20  BP: (130-138)/(50-74) 138/74  SpO2:  [94 %-95 %] 95 %  Body mass index is 37 88 kg/m²  Input and Output Summary (last 24 hours): Intake/Output Summary (Last 24 hours) at 18 1133  Last data filed at 18 1001   Gross per 24 hour   Intake             1400 ml   Output             2800 ml   Net            -1400 ml       Physical Exam:     Physical Exam  Patient is awake alert in no acute distress  Lung decreased breath sounds bilateral  Heart positive S1-S2 no murmur  Abdomen soft obese, nontender positive bowel sounds  Lower extremity with trace edema      Additional Data:     Labs:      Results from last 7 days  Lab Units 18  0521  18  1252   WBC Thousand/uL 8 39  --  10 90*   HEMOGLOBIN g/dL 12 4  --  12 9   HEMATOCRIT % 40 9  --  42 6   PLATELETS Thousands/uL 124*  < > 111*   NEUTROS PCT %  --   --  86*   LYMPHS PCT %  --   --  7*   MONOS PCT %  --   --  5   EOS PCT %  --   --  1   < > = values in this interval not displayed      Results from last 7 days  Lab Units 18  0559  18  0500   SODIUM mmol/L 138  < > 132*   POTASSIUM mmol/L 4 1  < > 6 0*   CHLORIDE mmol/L 94*  < > 93*   CO2 mmol/L 39*  < > 34*   BUN mg/dL 31*  < > 21   CREATININE mg/dL 1 47*  < > 1 47*   CALCIUM mg/dL 8 4  < > 8 3   TOTAL PROTEIN g/dL  --   --  7 0   BILIRUBIN TOTAL mg/dL  --   --  0 31   ALK PHOS U/L  --   --  82   ALT U/L  --   --  36   AST U/L  --   --  34   GLUCOSE RANDOM mg/dL 176*  < > 428*   < > = values in this interval not displayed  * I Have Reviewed All Lab Data Listed Above  * Additional Pertinent Lab Tests Reviewed: [facility] Admission Reviewed    Imaging:    Imaging Reports Reviewed Today Include:   Imaging Personally Reviewed by Myself Includes:     Recent Cultures (last 7 days):           Last 24 Hours Medication List:     Current Facility-Administered Medications:  EMS replenish medication  Does not apply Once Oracio Ness MD   albuterol 2 puff Inhalation Q4H PRN Mak Fisher MD   aspirin 81 mg Oral Daily Mak Fisher MD   budesonide-formoterol 2 puff Inhalation BID Mak Fisher MD   enoxaparin 40 mg Subcutaneous Daily Mak Fisher MD   ferrous sulfate 325 mg Oral Daily With Breakfast Mak Fisher MD   fish oil 1,000 mg Oral BID Mak Fisher MD   fluticasone 1 spray Nasal Daily aMk Fisher MD   furosemide 40 mg Intravenous BID (diuretic) Tasha Wright DO   insulin lispro 2-12 Units Subcutaneous 4x Daily (AC & HS) Mak Fisher MD   insulin regular 25 Units Subcutaneous Before Saqib Raphael MD   insulin regular 45 Units Subcutaneous BID before breakfast/lunch Mak Fisher MD   ipratropium 0 5 mg Nebulization Q6H Mak Fisher MD   levalbuterol 1 25 mg Nebulization Q6H Mak Fisher MD   lisinopril 10 mg Oral Daily Mak Fisher MD   metoprolol tartrate 25 mg Oral Q12H [address] Mak Fisher MD   oxyCODONE-acetaminophen 0 5 tablet Oral Q4H PRN Mak Fisher MD   pantoprazole 40 mg Oral BID AC Mak Fisher MD   potassium chloride 20 mEq Oral BID Mak Fisher MD   pravastatin 80 mg Oral Daily With Saqib Raphael MD   tamsulosin 0 4 mg Oral Daily With YOLANDE Paredes        Today, Patient Was Seen By: Ant Hermosillo DO    ** Please Note: This note has been constructed using a voice recognition system   **

## 2018-05-25 NOTE — PROGRESS NOTES
Cardiology Progress Note - Jesse Solis Sr  81 y o  male MRN: [id number]    Unit/Bed#: [facility] 606-01 Encounter: [id number]      Assessment:  1  Acute on chronic diastolic CHF  2  CAD s/p multivessel PCI  3  Hypertension  4  Dyslipidemia  5  Obesity with BMI 38  6  Chronic hypoxic respiratory failure with severe COPD  7  CKD III  8  Probable JACQUELINE    Plan:  1  Negative fluid balance, weight down  2  Continue IV diuretics    3  Creatinine stable, AM BMP  4   Echo noted and technically limited  5  Blood pressure control generally acceptable       Subjective:   Patient seen and examined  No significant events overnight  Objective:     Vitals: Blood pressure 138/74, pulse 74, temperature 98 6 °F (37 °C), resp  rate 20, height 6' (1 829 m), weight 127 kg (279 lb 5 2 oz), SpO2 95 %  , Body mass index is 37 88 kg/m² , Orthostatic Blood Pressures      Most Recent Value   Blood Pressure  138/74 filed at 05/25/2018 0300   Patient Position - Orthostatic VS  Lying filed at 05/24/2018 2300            Intake/Output Summary (Last 24 hours) at 05/25/18 1037  Last data filed at 05/25/18 0631   Gross per 24 hour   Intake             1400 ml   Output             3400 ml   Net            -2000 ml       Physical Exam:    GEN: Jesse Solis Sr  appears well, alert and oriented x 3, pleasant and cooperative   HEENT: pupils equal, round, and reactive to light; extraocular muscles intact  NECK: supple, no carotid bruits, no obvious elevated JVP   HEART: regular rhythm, normal S1 and S2, no murmurs, clicks, gallops or rubs   LUNGS: clear to auscultation bilaterally; no wheezes, rales, or rhonchi   ABDOMEN: normal bowel sounds, soft, no tenderness, no distention  EXTREMITIES: peripheral pulses normal; no clubbing, cyanosis, 1+ edema  NEURO: no focal findings   SKIN: normal without suspicious lesions on exposed skin    Medications:      Current Facility-Administered Medications:      EMS REPLENISHMENT MED, , Does not apply, Once, Henry Young MD    albuterol (PROVENTIL HFA,VENTOLIN HFA) inhaler 2 puff, 2 puff, Inhalation, Q4H PRN, Darren Amador MD    aspirin (ECOTRIN LOW STRENGTH) EC tablet 81 mg, 81 mg, Oral, Daily, Darren Amador MD, 81 mg at 05/25/18 8042    budesonide-formoterol (SYMBICORT) 160-4 5 mcg/act inhaler 2 puff, 2 puff, Inhalation, BID, Darren Amador MD, 2 puff at 05/25/18 0937    enoxaparin (LOVENOX) subcutaneous injection 40 mg, 40 mg, Subcutaneous, Daily, Darren Amador MD, 40 mg at 05/25/18 7695    ferrous sulfate tablet 325 mg, 325 mg, Oral, Daily With Breakfast, Darren Amador MD, 325 mg at 05/25/18 2604    fish oil capsule 1,000 mg, 1,000 mg, Oral, BID, Darren Amador MD, 1,000 mg at 05/25/18 0938    fluticasone (FLONASE) 50 mcg/act nasal spray 1 spray, 1 spray, Nasal, Daily, Darren Amador MD, 1 spray at 05/25/18 9906    furosemide (LASIX) injection 40 mg, 40 mg, Intravenous, BID (diuretic), [place] Rosalbaach, DO, 40 mg at 05/25/18 0938    insulin lispro (HumaLOG) 100 units/mL subcutaneous injection 2-12 Units, 2-12 Units, Subcutaneous, 4x Daily (AC & HS), 4 Units at 05/25/18 0940 **AND** Fingerstick Glucose (POCT), , , 4x Daily AC and at bedtime, Darren Amador MD    insulin regular (HumuLIN R U-500) 500 units/mL CONCENTRATED injection 25 Units, 25 Units, Subcutaneous, Before Renee Bolaños MD, 25 Units at 05/24/18 1813    insulin regular (HumuLIN R U-500) 500 units/mL CONCENTRATED injection 45 Units, 45 Units, Subcutaneous, BID before breakfast/lunch, Darren Amador MD, 45 Units at 05/25/18 0942    ipratropium (ATROVENT) 0 02 % inhalation solution 0 5 mg, 0 5 mg, Nebulization, Q6H, Darren Amador MD, 0 5 mg at 05/25/18 0847    levalbuterol (XOPENEX) inhalation solution 1 25 mg, 1 25 mg, Nebulization, Q6H, Darren Amador MD, 1 25 mg at 05/25/18 0847    lisinopril (ZESTRIL) tablet 10 mg, 10 mg, Oral, Daily, Darren Amador MD, 10 mg at 05/25/18 0938    metoprolol tartrate (LOPRESSOR) tablet 25 mg, 25 mg, Oral, Q12H [address], Abel Roemo MD, 25 mg at 05/25/18 0939    oxyCODONE-acetaminophen (PERCOCET) 5-325 mg per tablet 0 5 tablet, 0 5 tablet, Oral, Q4H PRN, Abel Romeo MD, 0 5 tablet at 05/25/18 0628    pantoprazole (PROTONIX) EC tablet 40 mg, 40 mg, Oral, BID AC, Abel Romeo MD, 40 mg at 05/25/18 3230    potassium chloride (K-DUR,KLOR-CON) CR tablet 20 mEq, 20 mEq, Oral, BID, Abel Romeo MD, 20 mEq at 05/25/18 4741    pravastatin (PRAVACHOL) tablet 80 mg, 80 mg, Oral, Daily With Zulema Cabrera MD, 80 mg at 05/24/18 1817    tamsulosin (FLOMAX) capsule 0 4 mg, 0 4 mg, Oral, Daily With Dinner, YOLANDE Morris, 0 4 mg at 05/24/18 1817     Labs & Results:      Results from last 7 days  Lab Units 05/24/18  0500 05/24/18  0045 05/23/18  1252   TROPONIN I ng/mL 0 02 0 03 0 06*       Results from last 7 days  Lab Units 05/24/18  0521 05/23/18  2213 05/23/18  1252   WBC Thousand/uL 8 39  --  10 90*   HEMOGLOBIN g/dL 12 4  --  12 9   HEMATOCRIT % 40 9  --  42 6   PLATELETS Thousands/uL 124* 117* 111*       Results from last 7 days  Lab Units 05/24/18  0500   CHOLESTEROL mg/dL 157   TRIGLYCERIDES mg/dL 95   HDL mg/dL 80*       Results from last 7 days  Lab Units 05/25/18  0559 05/24/18  0807 05/24/18  0500 05/23/18  1252   SODIUM mmol/L 138 131* 132* 138   POTASSIUM mmol/L 4 1 5 3 6 0* 4 6   CHLORIDE mmol/L 94* 93* 93* 97*   CO2 mmol/L 39* 32 34* 37*   BUN mg/dL 31* 22 21 22   CREATININE mg/dL 1 47* 1 43* 1 47* 1 24   CALCIUM mg/dL 8 4 8 4 8 3 8 6   TOTAL PROTEIN g/dL  --   --  7 0 6 7   BILIRUBIN TOTAL mg/dL  --   --  0 31 0 36   ALK PHOS U/L  --   --  82 79   ALT U/L  --   --  36 39   AST U/L  --   --  34 43   GLUCOSE RANDOM mg/dL 176* 433* 428* 154*           Results from last 7 days  Lab Units 05/24/18  0500   MAGNESIUM mg/dL 2 6

## 2018-05-25 NOTE — MEDICAL STUDENT
Progress Note - Esther Bridges Sr  81 y o  male MRN: [id number]    [facility] Encounter: [id number]      Assessment:  Mr Tejal Michael is a 82 YO M who presented to the ED on 5/23 with sudden onset shortness of breath    1  Acute on chronic shortness of breath  2  Acute on chronic diastolic heart failure  3  COPD  4  Insulin-dependent DM  5  Stage III CKD  6  Hyponatremia    Plan:    1  Shortness of breath appears to have resolved at this time; patient is on 3L O2 by NC at home  Continue at d/c  2  Continue other COPD treatments at d/c  3  Acute symptoms likely secondary to noncompliance with Lasix; patient is receiving IV lasix to normalize fluid status  4  Encourage compliance on PO Lasix at d/c  5  Echo was of poor quality, but estimated EF of 65% was unchanged from prior study  6  Continue inpatient sliding scale insulin  7  CKD appears at baseline  8  Hyponatremia is resolved    Subjective:     Mr Tejal Michael is a 82 YO M who presented to the ED on 5/23 with shortness of breath  He states that he awoke in the morning to urinate, found himself to be too short of breath to get out of bed  He states that when he finally got out of bed, he administered several treatments of his at-home nebulized albuterol  These improved his symptoms, and he called EM  En route to the hospital he received another nebulizer treatment, which he states further improved his symptoms  The patient admitted to noncompliance with Lasix medication due to urinary frequency secondary to BPH  At this point, it appears his shortness of breath was likely secondary to acute CHF due to medication noncompliance, as his symptoms have resolved with two days of IV Lasix  This morning, the patient denies any symptoms, stating that he feels he is at baseline and ready to return home  He does not appear short of breath while eating, and he states that he had no problems with ambulating  He is currently on his baseline 3L O2       Objective: Vitals: Blood pressure 138/74, pulse 74, temperature 98 6 °F (37 °C), resp  rate 20, height 6' (1 829 m), weight 127 kg (279 lb 5 2 oz), SpO2 95 %  ,Body mass index is 37 88 kg/m²  Wt Readings from Last 3 Encounters:   05/25/18 127 kg (279 lb 5 2 oz)   04/27/18 124 kg (273 lb 5 9 oz)   03/24/18 127 kg (279 lb)     Temp Readings from Last 3 Encounters:   05/25/18 98 6 °F (37 °C)   04/30/18 97 8 °F (36 6 °C) (Oral)   03/24/18 97 9 °F (36 6 °C) (Oral)     BP Readings from Last 3 Encounters:   05/25/18 138/74   04/30/18 144/70   03/24/18 118/51     Pulse Readings from Last 3 Encounters:   05/25/18 74   04/30/18 88   03/24/18 78           Intake/Output Summary (Last 24 hours) at 05/25/18 0944  Last data filed at 05/25/18 0631   Gross per 24 hour   Intake             1400 ml   Output             3400 ml   Net            -2000 ml       Physical Exam: General appearance: alert and oriented, in no acute distress  Head: Normocephalic, without obvious abnormality, atraumatic  Neck: no adenopathy, no JVD, supple, symmetrical, trachea midline and thyroid not enlarged, symmetric, no tenderness/mass/nodules  Lungs: clear to auscultation bilaterally  Heart: regular rate and rhythm, S1, S2 normal, no murmur, click, rub or gallop  Abdomen: soft, non-tender; bowel sounds normal; no masses,  no organomegaly  Extremities: edema 2+ of bilateral LE, at baseline     Invasive Devices     Peripheral Intravenous Line            Peripheral IV 05/23/18 Right Antecubital 1 day                Lab, Imaging and other studies: I have personally reviewed pertinent reports       Lab Results   Component Value Date    WBC 8 39 05/24/2018    HGB 12 4 05/24/2018    HCT 40 9 05/24/2018    MCV 90 05/24/2018     (L) 05/24/2018     Lab Results   Component Value Date    GLUCOSE 176 (H) 05/25/2018    CALCIUM 8 4 05/25/2018     05/25/2018    K 4 1 05/25/2018    CO2 39 (H) 05/25/2018    CL 94 (L) 05/25/2018    BUN 31 (H) 05/25/2018    CREATININE 1 47 (H) 05/25/2018       VTE Pharmacologic Prophylaxis: Enoxaparin (Lovenox)  VTE Mechanical Prophylaxis: reason for no mechanical VTE prophylaxis ambulatory

## 2018-05-25 NOTE — CASE MANAGEMENT
Continued Stay Review    Thank you,  [facility] in the [facility] by Onofre Carroll for 2017  Network Utilization Review Department  Phone: [phone]; Fax [phone]  ATTENTION: The Network Utilization Review Department is now centralized for our 7 Facilities  Make a note that we have a new phone and fax numbers for our Department  Please call with any questions or concerns to [phone] and carefully follow the prompts so that you are directed to the right person  All voicemails are confidential  Fax any determinations, approvals, denials, and requests for initial or continue stay review clinical to [phone]  Due to HIGH CALL volume, it would be easier if you could please send faxed requests to expedite your requests and in part, help us provide discharge notifications faster      Date: 5/25/2018    Vital Signs: /74   Pulse 74   Temp 98 6 °F (37 °C)   Resp 20   Ht 6' (1 829 m)   Wt 127 kg (279 lb 5 2 oz)   SpO2 95% on 3 L NC O2    BMI 37 88 kg/m²     Medications:   Scheduled Meds:   Current Facility-Administered Medications:  EMS replenish medication  Does not apply Once   albuterol 2 puff Inhalation Q4H PRN   aspirin 81 mg Oral Daily   budesonide-formoterol 2 puff Inhalation BID   ferrous sulfate 325 mg Oral Daily With Breakfast   fish oil 1,000 mg Oral BID   fluticasone 1 spray Nasal Daily   furosemide 40 mg Intravenous BID (diuretic)   [START ON 5/26/2018] heparin (porcine) 5,000 Units Subcutaneous Q8H [address]   insulin lispro 2-12 Units Subcutaneous 4x Daily (AC & HS)   insulin regular 25 Units Subcutaneous Before Dinner   insulin regular 45 Units Subcutaneous BID before breakfast/lunch   ipratropium 0 5 mg Nebulization Q6H   levalbuterol 1 25 mg Nebulization Q6H   lisinopril 10 mg Oral Daily   metoprolol tartrate 25 mg Oral Q12H [address]   oxyCODONE-acetaminophen 0 5 tablet Oral Q4H PRN   pantoprazole 40 mg Oral BID AC   potassium chloride 20 mEq Oral BID   pravastatin 80 mg Oral Daily With Dinner   tamsulosin 0 4 mg Oral Daily With Dinner        Abnormal Labs/Diagnostic Results:   Lab Units 05/24/18  0521   05/23/18  1252   WBC Thousand/uL 8 39  --  10 90*   HEMOGLOBIN g/dL 12 4  --  12 9   HEMATOCRIT % 40 9  --  42 6   PLATELETS Thousands/uL 124*  < > 111*   NEUTROS PCT %  --   --  86*   LYMPHS PCT %  --   --  7*   MONOS PCT %  --   --  5   EOS PCT %  --   --  1   < > = values in this interval not displayed        Lab Units 05/25/18  0559   05/24/18  0500   SODIUM mmol/L 138  < > 132*   POTASSIUM mmol/L 4 1  < > 6 0*   CHLORIDE mmol/L 94*  < > 93*   CO2 mmol/L 39*  < > 34*   BUN mg/dL 31*  < > 21   CREATININE mg/dL 1 47*  < > 1 47*   CALCIUM mg/dL 8 4  < > 8 3   TOTAL PROTEIN g/dL  --   --  7 0   BILIRUBIN TOTAL mg/dL  --   --  0 31   ALK PHOS U/L  --   --  82   ALT U/L  --   --  36   AST U/L  --   --  34   GLUCOSE RANDOM mg/dL 176*  < > 428*   < > = values in this interval not displayed              Age/Sex: 81 y o  male     Assessment/Plan:   Progress note 5/25    1  Acute on chronic diastolic CHF, cardiac echo with EF 65%, negative fluid balance,  per cardiology continue with IV Lasix  2  Abnormal V/Q scan, suspect secondary to underlying COPD, D-dimer 522, negative lower extremity venous Doppler, less likely PE   3  COPD with chronic hypoxic respiratory failure , home oxygen 3L/min, continue Symbicort  4  Chronic disease stage III, stable, monitor  5  Diabetes mellitus type 2 with A1c 7 9, monitor for hypoglycemia, continue current dose of Humulin R  6  BPH with urinary frequency, per Urology, continue with Flomax, outpatient follow-up  7  Hyponatremia, resolved,  continue to monitor  8  CAD status post PCI, continue aspirin, statin and beta-blocker  9    Hypertension, acceptable blood pressure, continue lisinopril and Lopressor    Cardiology - continue IV lasix     Discharge Plan:  Open to [facility] - independent with ADL's  Has a cane

## 2018-05-25 NOTE — RESTORATIVE TECHNICIAN NOTE
Restorative Specialist Mobility Note       Activity: Ambulate in gomez, Ambulate in room, Bathroom privileges, Stand at bedside, Dangle (Educated/encouraged pt to ambulate with assistance 3-4 x's/day   Pt callbell, phone/tray within reach )     Assistive Device: None (NC O2 on 3L)       Ann PINEDA, Restorative Technician, [facility]

## 2018-05-26 VITALS
TEMPERATURE: 97.6 F | RESPIRATION RATE: 20 BRPM | HEART RATE: 62 BPM | BODY MASS INDEX: 37.03 KG/M2 | SYSTOLIC BLOOD PRESSURE: 125 MMHG | HEIGHT: 72 IN | WEIGHT: 273.37 LBS | DIASTOLIC BLOOD PRESSURE: 70 MMHG | OXYGEN SATURATION: 96 %

## 2018-05-26 LAB
ANION GAP SERPL CALCULATED.3IONS-SCNC: 3 MMOL/L (ref 4–13)
BUN SERPL-MCNC: 29 MG/DL (ref 5–25)
CALCIUM SERPL-MCNC: 8.5 MG/DL (ref 8.3–10.1)
CHLORIDE SERPL-SCNC: 93 MMOL/L (ref 100–108)
CO2 SERPL-SCNC: 39 MMOL/L (ref 21–32)
CREAT SERPL-MCNC: 1.37 MG/DL (ref 0.6–1.3)
GFR SERPL CREATININE-BSD FRML MDRD: 52 ML/MIN/1.73SQ M
GLUCOSE SERPL-MCNC: 216 MG/DL (ref 65–140)
GLUCOSE SERPL-MCNC: 228 MG/DL (ref 65–140)
GLUCOSE SERPL-MCNC: 237 MG/DL (ref 65–140)
MAGNESIUM SERPL-MCNC: 2.5 MG/DL (ref 1.6–2.6)
POTASSIUM SERPL-SCNC: 4.4 MMOL/L (ref 3.5–5.3)
SODIUM SERPL-SCNC: 135 MMOL/L (ref 136–145)

## 2018-05-26 PROCEDURE — 94760 N-INVAS EAR/PLS OXIMETRY 1: CPT

## 2018-05-26 PROCEDURE — 99232 SBSQ HOSP IP/OBS MODERATE 35: CPT | Performed by: INTERNAL MEDICINE

## 2018-05-26 PROCEDURE — 94640 AIRWAY INHALATION TREATMENT: CPT

## 2018-05-26 PROCEDURE — 82948 REAGENT STRIP/BLOOD GLUCOSE: CPT

## 2018-05-26 PROCEDURE — 83735 ASSAY OF MAGNESIUM: CPT | Performed by: INTERNAL MEDICINE

## 2018-05-26 PROCEDURE — 99239 HOSP IP/OBS DSCHRG MGMT >30: CPT | Performed by: INTERNAL MEDICINE

## 2018-05-26 PROCEDURE — 80048 BASIC METABOLIC PNL TOTAL CA: CPT | Performed by: INTERNAL MEDICINE

## 2018-05-26 RX ORDER — TAMSULOSIN HYDROCHLORIDE 0.4 MG/1
0.4 CAPSULE ORAL
Qty: 30 CAPSULE | Refills: 0 | Status: SHIPPED | OUTPATIENT
Start: 2018-05-26 | End: 2020-05-26

## 2018-05-26 RX ORDER — FUROSEMIDE 40 MG/1
40 TABLET ORAL 2 TIMES DAILY
Qty: 60 TABLET | Refills: 0 | Status: SHIPPED | OUTPATIENT
Start: 2018-05-26 | End: 2018-07-12 | Stop reason: SDUPTHER

## 2018-05-26 RX ADMIN — IPRATROPIUM BROMIDE 0.5 MG: 0.5 SOLUTION RESPIRATORY (INHALATION) at 13:31

## 2018-05-26 RX ADMIN — INSULIN HUMAN 45 UNITS: 500 INJECTION, SOLUTION SUBCUTANEOUS at 12:39

## 2018-05-26 RX ADMIN — ASPIRIN 81 MG: 81 TABLET, COATED ORAL at 08:41

## 2018-05-26 RX ADMIN — FUROSEMIDE 40 MG: 10 INJECTION, SOLUTION INTRAMUSCULAR; INTRAVENOUS at 08:41

## 2018-05-26 RX ADMIN — PANTOPRAZOLE SODIUM 40 MG: 40 TABLET, DELAYED RELEASE ORAL at 06:35

## 2018-05-26 RX ADMIN — IPRATROPIUM BROMIDE 0.5 MG: 0.5 SOLUTION RESPIRATORY (INHALATION) at 07:39

## 2018-05-26 RX ADMIN — FLUTICASONE PROPIONATE 1 SPRAY: 50 SPRAY, METERED NASAL at 08:42

## 2018-05-26 RX ADMIN — LISINOPRIL 10 MG: 10 TABLET ORAL at 08:41

## 2018-05-26 RX ADMIN — LEVALBUTEROL HYDROCHLORIDE 1.25 MG: 1.25 SOLUTION, CONCENTRATE RESPIRATORY (INHALATION) at 13:31

## 2018-05-26 RX ADMIN — INSULIN LISPRO 4 UNITS: 100 INJECTION, SOLUTION INTRAVENOUS; SUBCUTANEOUS at 08:42

## 2018-05-26 RX ADMIN — METOPROLOL TARTRATE 25 MG: 25 TABLET, FILM COATED ORAL at 08:40

## 2018-05-26 RX ADMIN — LEVALBUTEROL HYDROCHLORIDE 1.25 MG: 1.25 SOLUTION, CONCENTRATE RESPIRATORY (INHALATION) at 07:39

## 2018-05-26 RX ADMIN — HEPARIN SODIUM 5000 UNITS: 5000 INJECTION, SOLUTION INTRAVENOUS; SUBCUTANEOUS at 06:37

## 2018-05-26 RX ADMIN — LEVALBUTEROL HYDROCHLORIDE 1.25 MG: 1.25 SOLUTION, CONCENTRATE RESPIRATORY (INHALATION) at 00:26

## 2018-05-26 RX ADMIN — INSULIN HUMAN 45 UNITS: 500 INJECTION, SOLUTION SUBCUTANEOUS at 08:41

## 2018-05-26 RX ADMIN — INSULIN LISPRO 4 UNITS: 100 INJECTION, SOLUTION INTRAVENOUS; SUBCUTANEOUS at 12:39

## 2018-05-26 RX ADMIN — BUDESONIDE AND FORMOTEROL FUMARATE DIHYDRATE 2 PUFF: 160; 4.5 AEROSOL RESPIRATORY (INHALATION) at 08:42

## 2018-05-26 RX ADMIN — POTASSIUM CHLORIDE 20 MEQ: 1500 TABLET, EXTENDED RELEASE ORAL at 08:41

## 2018-05-26 RX ADMIN — Medication 325 MG: at 08:41

## 2018-05-26 RX ADMIN — IPRATROPIUM BROMIDE 0.5 MG: 0.5 SOLUTION RESPIRATORY (INHALATION) at 00:26

## 2018-05-26 RX ADMIN — Medication 1000 MG: at 08:40

## 2018-05-26 RX ADMIN — OXYCODONE HYDROCHLORIDE AND ACETAMINOPHEN 0.5 TABLET: 5; 325 TABLET ORAL at 06:35

## 2018-05-26 NOTE — PROGRESS NOTES
Cardiology Progress Note - Tiara Black Sr  81 y o  male MRN: [id number]    Unit/Bed#: [facility] 606-01 Encounter: [id number]      Assessment:  1  Acute on chronic diastolic CHF  2  CAD s/p multivessel PCI  3  Hypertension  4  Dyslipidemia  5  Obesity with BMI 38  6  Chronic hypoxic respiratory failure with severe COPD  7  CKD III  8  Probable JACQUELINE  9  Hyponatremia    Plan:  1  Negative fluid balance, weight down 7 pounds  2  Volume status improved  3  Transition to oral diuretics - Lasix 40 mg BID upon discharge  4  Blood pressure controlled  5  BMP next week  6  Follow-up with Dr Dione Espino 5/31       Subjective:   Patient seen and examined  No significant events overnight  Objective:     Vitals: Blood pressure 125/70, pulse 72, temperature 97 6 °F (36 4 °C), temperature source Oral, resp  rate 20, height 6' (1 829 m), weight 124 kg (273 lb 5 9 oz), SpO2 98 %  , Body mass index is 37 08 kg/m² ,   Orthostatic Blood Pressures      Most Recent Value   Blood Pressure  125/70 filed at 05/26/2018 1301   Patient Position - Orthostatic VS  Lying filed at 05/25/2018 2331            Intake/Output Summary (Last 24 hours) at 05/26/18 1110  Last data filed at 05/26/18 0554   Gross per 24 hour   Intake              677 ml   Output             2325 ml   Net            -1648 ml       Physical Exam:    GEN: Tiara Black Sr  appears well, alert and oriented x 3, pleasant and cooperative   HEENT: pupils equal, round, and reactive to light; extraocular muscles intact  NECK: supple, no carotid bruits, no obvious elevated JVP   HEART: regular rhythm, normal S1 and S2, no murmurs, clicks, gallops or rubs   LUNGS: clear to auscultation bilaterally; no wheezes, rales, or rhonchi   ABDOMEN: normal bowel sounds, soft, no tenderness, no distention  EXTREMITIES: peripheral pulses normal; no clubbing, cyanosis, trace edema  NEURO: no focal findings   SKIN: normal without suspicious lesions on exposed skin    Medications:      Current Facility-Administered Medications:      EMS REPLENISHMENT MED, , Does not apply, Once, Jena Wise MD    albuterol (PROVENTIL HFA,VENTOLIN HFA) inhaler 2 puff, 2 puff, Inhalation, Q4H PRN, Misbah Barcenas MD    aspirin (ECOTRIN LOW STRENGTH) EC tablet 81 mg, 81 mg, Oral, Daily, Misbah Barcenas MD, 81 mg at 05/26/18 0841    budesonide-formoterol (SYMBICORT) 160-4 5 mcg/act inhaler 2 puff, 2 puff, Inhalation, BID, Misbah Barcenas MD, 2 puff at 05/26/18 9062    ferrous sulfate tablet 325 mg, 325 mg, Oral, Daily With Breakfast, Misbah Barcenas MD, 325 mg at 05/26/18 0841    fish oil capsule 1,000 mg, 1,000 mg, Oral, BID, Misbah Barcenas MD, 1,000 mg at 05/26/18 0840    fluticasone (FLONASE) 50 mcg/act nasal spray 1 spray, 1 spray, Nasal, Daily, Misbah Barcenas MD, 1 spray at 05/26/18 0842    furosemide (LASIX) injection 40 mg, 40 mg, Intravenous, BID (diuretic), Mary Ellen Kirby DO, 40 mg at 05/26/18 0841    heparin (porcine) subcutaneous injection 5,000 Units, 5,000 Units, Subcutaneous, Q8H [address], Mike Isbell DO, 5,000 Units at 05/26/18 0637    insulin lispro (HumaLOG) 100 units/mL subcutaneous injection 2-12 Units, 2-12 Units, Subcutaneous, 4x Daily (AC & HS), 4 Units at 05/26/18 0842 **AND** Fingerstick Glucose (POCT), , , 4x Daily AC and at bedtime, Misbah Barcenas MD    insulin regular (HumuLIN R U-500) 500 units/mL CONCENTRATED injection 25 Units, 25 Units, Subcutaneous, Before Darrell Waldron MD, 25 Units at 05/25/18 1756    insulin regular (HumuLIN R U-500) 500 units/mL CONCENTRATED injection 45 Units, 45 Units, Subcutaneous, BID before breakfast/lunch, Misbah Barcenas MD, 45 Units at 05/26/18 0841    ipratropium (ATROVENT) 0 02 % inhalation solution 0 5 mg, 0 5 mg, Nebulization, Q6H, Misbah Barcenas MD, 0 5 mg at 05/26/18 0739    levalbuterol (XOPENEX) inhalation solution 1 25 mg, 1 25 mg, Nebulization, Q6H, Misbah Barcenas MD, 1 25 mg at 05/26/18 0739    lisinopril (ZESTRIL) tablet 10 mg, 10 mg, Oral, Daily, Hilario Lefort, MD, 10 mg at 05/26/18 0841    metoprolol tartrate (LOPRESSOR) tablet 25 mg, 25 mg, Oral, Q12H [address], Hilario Lefort, MD, 25 mg at 05/26/18 0840    oxyCODONE-acetaminophen (PERCOCET) 5-325 mg per tablet 0 5 tablet, 0 5 tablet, Oral, Q4H PRN, Hilario Lefort, MD, 0 5 tablet at 05/26/18 0635    pantoprazole (PROTONIX) EC tablet 40 mg, 40 mg, Oral, BID AC, Hilario Lefort, MD, 40 mg at 05/26/18 3760    potassium chloride (K-DUR,KLOR-CON) CR tablet 20 mEq, 20 mEq, Oral, BID, Hilario Lefort, MD, 20 mEq at 05/26/18 0841    pravastatin (PRAVACHOL) tablet 80 mg, 80 mg, Oral, Daily With Angel Amin MD, 80 mg at 05/25/18 1751    tamsulosin (FLOMAX) capsule 0 4 mg, 0 4 mg, Oral, Daily With Dinner, Emi Mort, CRNP, 0 4 mg at 05/25/18 1755     Labs & Results:      Results from last 7 days  Lab Units 05/24/18  0500 05/24/18  0045 05/23/18  1252   TROPONIN I ng/mL 0 02 0 03 0 06*       Results from last 7 days  Lab Units 05/24/18  0521 05/23/18  2213 05/23/18  1252   WBC Thousand/uL 8 39  --  10 90*   HEMOGLOBIN g/dL 12 4  --  12 9   HEMATOCRIT % 40 9  --  42 6   PLATELETS Thousands/uL 124* 117* 111*       Results from last 7 days  Lab Units 05/24/18  0500   CHOLESTEROL mg/dL 157   TRIGLYCERIDES mg/dL 95   HDL mg/dL 80*       Results from last 7 days  Lab Units 05/26/18  0550 05/25/18  0559 05/24/18  0807 05/24/18  0500 05/23/18  1252   SODIUM mmol/L 135* 138 131* 132* 138   POTASSIUM mmol/L 4 4 4 1 5 3 6 0* 4 6   CHLORIDE mmol/L 93* 94* 93* 93* 97*   CO2 mmol/L 39* 39* 32 34* 37*   BUN mg/dL 29* 31* 22 21 22   CREATININE mg/dL 1 37* 1 47* 1 43* 1 47* 1 24   CALCIUM mg/dL 8 5 8 4 8 4 8 3 8 6   TOTAL PROTEIN g/dL  --   --   --  7 0 6 7   BILIRUBIN TOTAL mg/dL  --   --   --  0 31 0 36   ALK PHOS U/L  --   --   --  82 79   ALT U/L  --   --   --  36 39   AST U/L  --   --   --  34 43   GLUCOSE RANDOM mg/dL 216* 176* 433* 428* 154*           Results from last 7 days  Lab Units 05/26/18  0550 05/24/18  0500   MAGNESIUM mg/dL 2 5 2 6

## 2018-05-26 NOTE — DISCHARGE SUMMARY
[facility]    Patient Information: Alysha Amador Sr  81 y o  male MRN: [id number]  [facility] Encounter: [id number]    Discharging Physician / Practitioner: Aster Pettit DO  PCP: Vito Orantes MD  Admission Date: 5/23/2018  Discharge Date: 05/26/18    Disposition:     Home with VNA Services (Reminder: Complete face to face encounter)    Reason for Admission:   Acute on chronic diastolic CHF    Discharge Diagnoses:     Principal Problem:    Acute on chronic diastolic (congestive) heart failure  Active Problems:    Dyslipidemia    COPD (chronic obstructive pulmonary disease) (HCC)    Chronic kidney disease, stage 3    Shortness of breath - Chronic respiratory failure with hypoxia    Essential hypertension    Insulin dependent diabetes mellitus     Elevated d-dimer    CAD (coronary artery disease)    Urinary frequency    Hyponatremia  Resolved Problems:    NSTEMI type 2       Consultations During Hospital Stay:  · Cardiology  · Urology    Procedures Performed:   Cardiac echo with EF 65%  Lung V/Q scan with intermediate probability for PE  Lower extremity venous Doppler negative for DVT  Chest x-ray with small right pleural effusion  ·     Significant Findings / Test Results:     · As above    Incidental Findings:   · none    Test Results Pending at Discharge (will require follow up):   · none     Outpatient Tests Requested:  · none    Complications:  none    Hospital Course:     Alysha Amador Sr  is a 81 y o  male patient who originally presented to the hospital on 5/23/2018 due to shortness of breath and orthopnea  Patient with underlying CAD, chronic diastolic CHF, COPD with chronic hypoxic respiratory failure on home oxygen, he admits to noncompliance with home Lasix due to urinary symptoms  Patient was found to have acute on chronic diastolic CHF, he was admitted to the hospital, cardiology consulted, he was diuresed well with IV Lasix with improvement in his symptoms  D-dimer was  mildly elevated on admission, lower extremity venous Doppler was negative for DVT, V/Q scan was indeterminate due to COPD, it was felt all his symptoms are  secondary to volume overload and CHF exacerbation  He was evaluated by Urology due to urinary frequency, urgency and weak stream, negative urinalysis, he was started on Flomax  recommendation for outpatient follow-up    Currently patient is in stable condition, clinically improving, anxious to go home, he will be discharged home today on oral Lasix 40 mg b i d  and oral Flomax, he was instructed to follow with his family doctor in 4 week and with [facility] cardiology on 5/31 at 4:00 p m  Condition at Discharge: stable     Discharge Day Visit / Exam:     Subjective:    Patient seen and examined  Comfortable sitting in bed  Anxious to go  Breathing is back to baseline  Vitals: Blood Pressure: 125/70 (05/26/18 0742)  Pulse: 72 (05/26/18 0742)  Temperature: 97 6 °F (36 4 °C) (05/26/18 0742)  Temp Source: Oral (05/26/18 0742)  Respirations: 20 (05/26/18 0742)  Height: 6' (182 9 cm) (05/24/18 0603)  Weight - Scale: 124 kg (273 lb 5 9 oz) (05/26/18 0554)  SpO2: 98 % (05/26/18 0742)  Exam:   Physical Exam  Patient is awake alert in no acute distress  Lung decreased breath sounds bilateral  Heart positive S1-S2 no murmur  Abdomen soft obese, nontender positive bowel sounds  Lower extremity with trace edema  Discussion with Family:  I offered to call his son, he declined    Discharge instructions/Information to patient and family:   See after visit summary for information provided to patient and family  Provisions for Follow-Up Care:  See after visit summary for information related to follow-up care and any pertinent home health orders  Planned Readmission: no     Discharge Statement:  I spent 45 minutes discharging the patient  This time was spent on the day of discharge   I had direct contact with the patient on the day of discharge  Greater than 50% of the total time was spent examining patient, answering all patient questions, arranging and discussing plan of care with patient as well as directly providing post-discharge instructions  Additional time then spent on discharge activities  Discharge Medications:  See after visit summary for reconciled discharge medications provided to patient and family        ** Please Note: This note has been constructed using a voice recognition system **

## 2018-05-29 ENCOUNTER — PATIENT OUTREACH (OUTPATIENT)
Dept: CARDIOLOGY CLINIC | Facility: CLINIC | Age: 73
End: 2018-05-29

## 2018-05-29 NOTE — TELEPHONE ENCOUNTER
PT has been discharged  I cannot seem to find appointment for PT in this time frame as stated by [facility] operated by [facility] with Dr Miguel Rodriguez is closest location to PT  Please advise on appointment, thank you!

## 2018-05-30 ENCOUNTER — PATIENT OUTREACH (OUTPATIENT)
Dept: CARDIOLOGY CLINIC | Facility: CLINIC | Age: 73
End: 2018-05-30

## 2018-05-30 NOTE — PROGRESS NOTES
I also called and left message for Cheryl Esparza RN with [address] who saw patient yesterday for an update-  unsure of patient compliance

## 2018-06-01 ENCOUNTER — OFFICE VISIT (OUTPATIENT)
Dept: CARDIOLOGY CLINIC | Facility: CLINIC | Age: 73
End: 2018-06-01
Payer: COMMERCIAL

## 2018-06-01 ENCOUNTER — PATIENT OUTREACH (OUTPATIENT)
Dept: CARDIOLOGY CLINIC | Facility: CLINIC | Age: 73
End: 2018-06-01

## 2018-06-01 VITALS
HEIGHT: 72 IN | DIASTOLIC BLOOD PRESSURE: 56 MMHG | SYSTOLIC BLOOD PRESSURE: 100 MMHG | WEIGHT: 273.9 LBS | BODY MASS INDEX: 37.1 KG/M2

## 2018-06-01 DIAGNOSIS — J96.21 ACUTE ON CHRONIC RESPIRATORY FAILURE WITH HYPOXIA AND HYPERCAPNIA (HCC): Primary | ICD-10-CM

## 2018-06-01 DIAGNOSIS — I25.10 CORONARY ARTERY DISEASE INVOLVING NATIVE CORONARY ARTERY OF NATIVE HEART WITHOUT ANGINA PECTORIS: ICD-10-CM

## 2018-06-01 DIAGNOSIS — I50.33 ACUTE ON CHRONIC DIASTOLIC (CONGESTIVE) HEART FAILURE (HCC): ICD-10-CM

## 2018-06-01 DIAGNOSIS — J44.9 CHRONIC OBSTRUCTIVE PULMONARY DISEASE, UNSPECIFIED COPD TYPE (HCC): Chronic | ICD-10-CM

## 2018-06-01 DIAGNOSIS — J96.22 ACUTE ON CHRONIC RESPIRATORY FAILURE WITH HYPOXIA AND HYPERCAPNIA (HCC): Primary | ICD-10-CM

## 2018-06-01 DIAGNOSIS — I10 ESSENTIAL HYPERTENSION: ICD-10-CM

## 2018-06-01 PROCEDURE — 99214 OFFICE O/P EST MOD 30 MIN: CPT | Performed by: INTERNAL MEDICINE

## 2018-06-01 NOTE — PROGRESS NOTES
Cardiology   Lilo Sotelo  81 y o  male MRN: [id number]        Impression:  1  Dyspnea - multifactorial  Does appear to be volume overloaded  2  Coronary artery disease - stable  3  Hypertension - controlled  4  Dyslipidemia - on statin  Recommendations:  1  Increase lasix to 40mg 2x/day  2  Continue remainder of medications  3  Follow up in 2 months  HPI: Blake Quiles Sr  is a 81y o  year old male with coronary artery disease, COPD, and pulmonary hypertension, recently in the hospital in April and May 1087 with diastolic heart failure  He has been taking his furosemide once a day instead of twice a day  Does currently have edema and dyspnea  Is supposed to wear O2 all the time, but is having trouble with his portable device  Review of Systems   Constitutional: Negative  HENT: Negative  Eyes: Negative  Respiratory: Positive for shortness of breath  Negative for chest tightness  Cardiovascular: Positive for leg swelling  Negative for chest pain and palpitations  Gastrointestinal: Negative  Endocrine: Negative  Genitourinary: Negative  Musculoskeletal: Negative  Skin: Negative  Allergic/Immunologic: Negative  Neurological: Negative  Hematological: Negative  Psychiatric/Behavioral: Negative  All other systems reviewed and are negative  Past Medical History:   Diagnosis Date    Abdominal pain     Cardiac disease     CHF (congestive heart failure) (HCC)     COPD (chronic obstructive pulmonary disease) (HCC)     Coronary artery disease     [facility] mellitus [facility] [facility]     Hyperlipidemia     Hypertension     MI (myocardial infarction) ([facility] [facility] )     with 3 stents    Prostate cancer ([facility] [address] )      Past Surgical History:   Procedure Laterality Date    ABDOMINAL SURGERY      exploratory    ANGIOPLASTY      3 stents    ESOPHAGOGASTRODUODENOSCOPY N/A 10/2/2017    Procedure: ESOPHAGOGASTRODUODENOSCOPY (EGD);   Surgeon: Evens Robertson MD;  Location: [facility]; Service: Gastroenterology    PROSTATE SURGERY       History   Alcohol Use No     History   Drug Use No     History   Smoking Status    Former Smoker    Packs/day: 1 50    Years: 50 00    Quit date: 9/13/2017   Smokeless Tobacco    Never Used     Family History   Problem Relation Age of Onset    Heart disease Father        Allergies:   Allergies   Allergen Reactions    Metformin GI Intolerance       Medications:     Current Outpatient Prescriptions:     albuterol (2 5 mg/3 mL) 0 083 % nebulizer solution, Inhale 1 each, Disp: , Rfl:     aspirin (ECOTRIN LOW STRENGTH) 81 mg EC tablet, Take 1 tablet by mouth daily, Disp: , Rfl:     budesonide-formoterol (SYMBICORT) 160-4 5 mcg/act inhaler, Inhale 2 puffs 2 (two) times a day, Disp: , Rfl:     ferrous sulfate 325 (65 Fe) mg tablet, Take 325 mg by mouth daily with breakfast, Disp: , Rfl:     fluticasone (FLONASE) 50 mcg/act nasal spray, 1 spray into each nostril daily, Disp: 16 g, Rfl: 0    furosemide (LASIX) 40 mg tablet, Take 1 tablet (40 mg total) by mouth 2 (two) times a day, Disp: 60 tablet, Rfl: 0    insulin regular (HumuLIN R U-500) 500 units/mL CONCENTRATED injection, Inject 0 09 mL (45 Units total) under the skin 2 (two) times a day before breakfast and lunch, Disp: 10 mL, Rfl: 0    insulin regular (HumuLIN R U-500) 500 units/mL CONCENTRATED injection, Inject 0 05 mL (25 Units total) under the skin daily before dinner, Disp: 10 mL, Rfl: 0    Insulin Syringe/Needle U-500 (BD INSULIN SYRINGE U-500) 31G X 6MM 0 5 ML MISC, by Does not apply route, Disp: , Rfl:     lisinopril (ZESTRIL) 10 mg tablet, Take 10 mg by mouth daily Patient unsure of dose of lisinopril , Disp: , Rfl:     metoprolol tartrate (LOPRESSOR) 25 mg tablet, Take 1 tablet by mouth 2 (two) times a day, Disp: , Rfl:     Omega-3 Fatty Acids (FISH OIL) 645 MG CAPS, Take 1 capsule by mouth 2 (two) times a day, Disp: , Rfl:     oxyCODONE-acetaminophen (PERCOCET) 7 5-325 MG per tablet, Take by mouth every 4 (four) hours, Disp: , Rfl:     pantoprazole (PROTONIX) 40 mg tablet, Take 1 tablet by mouth 2 (two) times a day before meals, Disp: 60 tablet, Rfl: 0    potassium chloride (K-DUR,KLOR-CON) 20 mEq tablet, Take 1 tablet (20 mEq total) by mouth 2 (two) times a day, Disp: 60 tablet, Rfl: 0    simvastatin (ZOCOR) 40 mg tablet, Take 40 mg by mouth daily, Disp: , Rfl: 5    tamsulosin (FLOMAX) 0 4 mg, Take 1 capsule (0 4 mg total) by mouth daily with dinner, Disp: 30 capsule, Rfl: 0    tiotropium (SPIRIVA) 18 mcg inhalation capsule, Place 18 mcg into inhaler and inhale daily, Disp: , Rfl:       Wt Readings from Last 3 Encounters:   06/01/18 124 kg (273 lb 14 4 oz)   05/26/18 124 kg (273 lb 5 9 oz)   04/27/18 124 kg (273 lb 5 9 oz)     Temp Readings from Last 3 Encounters:   05/26/18 97 6 °F (36 4 °C) (Oral)   04/30/18 97 8 °F (36 6 °C) (Oral)   03/24/18 97 9 °F (36 6 °C) (Oral)     BP Readings from Last 3 Encounters:   06/01/18 100/56   05/26/18 125/70   04/30/18 144/70     Pulse Readings from Last 3 Encounters:   05/26/18 62   04/30/18 88   03/24/18 78         Physical Exam   Constitutional: He is oriented to person, place, and time  He appears well-developed  HENT:   Head: Normocephalic  Eyes: EOM are normal    Neck: Normal range of motion  Cardiovascular: Normal rate and regular rhythm  Exam reveals no gallop  No murmur heard  1+ BL LE edema   Pulmonary/Chest: Effort normal and breath sounds normal  No respiratory distress  He has no wheezes  He has no rales  Abdominal: Soft  Musculoskeletal: Normal range of motion  Neurological: He is alert and oriented to person, place, and time  Skin: Skin is warm and dry  Psychiatric: He has a normal mood and affect           Laboratory Studies:  CMP:  Lab Results   Component Value Date     (L) 05/26/2018    K 4 4 05/26/2018    CL 93 (L) 05/26/2018    CO2 39 (H) 05/26/2018    ANIONGAP 3 (L) 05/26/2018    BUN 29 (H) 2018    CREATININE 1 37 (H) 2018    GLUCOSE 216 (H) 2018    AST 34 2018    ALT 36 2018    BILITOT 0 31 2018    EGFR 52 2018       Lipid Profile:   Lab Results   Component Value Date    CHOL 157 2018     Lab Results   Component Value Date    HDL 80 (H) 2018     Lab Results   Component Value Date    LDLCALC 58 2018     Lab Results   Component Value Date    TRIG 95 2018       Cardiac testing:     Results for orders placed during the hospital encounter of 18   Echo complete with contrast if indicated    Narrative [address]  [address] [facility], [address] [facility]  [phone]    Transthoracic Echocardiogram  2D, M-mode, Doppler, and Color Doppler    Study date:  25-May-2018    Patient: Haroon Dorantes  MR number: [id number]  Account number: [de-identified]  : 1945  Age: 81 years  Gender: Male  Status: Inpatient  Location: Bedside  Height: 72 in  Weight: 278 5 lb  BP: 131/ 65 mmHg    Indications: Shortness of Breath    Diagnoses: R06 02 - Shortness of breath    Sonographer:  aLtonia Modi RDCS  Primary Physician:  Wil Victoria MD  Referring Physician:  Elizabeth Galdamez DO  Group:  [facility]  Interpreting Physician:  Tomeka Soriano MD    SUMMARY    SUMMARY:  This was a technically difficult study  Echocardiographic views were limited due to decreased ultrasound penetration, and lung interference  The left ventricular size and function was normal with an estimated ejection fraction of 65%  There was moderate concentricc left ventricular hypertrophy  Although no obvious regional left ventricular wall motion abnormality was noted,  segmental wall motion could not be assessed adequately  The right ventricular size and function appeared normal in limited views  This study was not optimal for evaluation of cardiac valves  There was no pericardial effusion      HISTORY: PRIOR HISTORY: HLD, COPD, CKD  HTN, IDDM, CHF, NSTEMI, CAD    PROCEDURE: The procedure was performed at the bedside  This was a routine study  The transthoracic approach was used  The study included complete 2D imaging, M-mode, complete spectral Doppler, and color Doppler  The heart rate was 80 bpm,  at the start of the study  Images were obtained from the parasternal, apical, subcostal, and suprasternal notch acoustic windows  Echocardiographic views were limited due to decreased penetration and lung interference  This was a  technically difficult study  SYSTEM MEASUREMENT TABLES    2D mode  Aortic Root Diameter; User chosen value; 2D mode;: 3 7 cm  LA/Ao (2D): 0 92  Left Atrium Vega-posterior Systolic Dimension; User chosen value; 2D mode;: 3 4 cm  EDV (2D-Cubed): 72 5 cm3  EF (2D-Cubed): 57 7 %  ESV (2D-Cubed): 30 7 cm3  FS (2D-Cubed): 24 9 %  FS (2D-Teich): 24 9 %  IVS/LVPW (2D): 1 11  Interventricular Septum Diastolic Thickness; User chosen value; 2D mode;: 1 33 cm  Left Ventricle Internal End Diastolic Dimension; User chosen value; 2D mode;: 4 17 cm  Left Ventricle Internal Systolic Dimension; User chosen value; 2D mode;: 3 13 cm  Left Ventricle Posterior Wall Diastolic Thickness; User chosen value; 2D mode;: 1 2 cm  Left Ventricular Ejection Fraction; Teichholz; 2D mode;: 49 8 %  Left Ventricular End Diastolic Volume; Teichholz; 2D mode;: 77 3 cm3  Left Ventricular End Systolic Volume; Teichholz; 2D mode;: 38 8 cm3  SI (2D-Cubed): 17 1 ml/m2  SV (2D-Cubed): 41 8 cm3  Stroke Index; Teichholz; 2D mode;: 15 7 ml/m2  Stroke Volume; Teichholz; 2D mode;: 38 5 cm3    Apical four chamber  LV MOD Diam; Recent value; End Diastole (A4C): 2 3 cm  LV MOD Diam; Recent value; End Diastole (A4C): 4 72 cm  LV MOD Diam; Recent value; End Diastole (A4C): 4 97 cm  LV MOD Diam; Recent value; End Diastole (A4C): 5 13 cm  LV MOD Diam; Recent value; End Diastole (A4C): 5 09 cm  LV MOD Diam; Recent value;  End Diastole (A4C): 4 93 cm  LV MOD Diam; Recent value; End Diastole (A4C): 1 9 cm  LV MOD Diam; Recent value; End Diastole (A4C): 2 67 cm  LV MOD Diam; Recent value; End Diastole (A4C): 3 19 cm  LV MOD Diam; Recent value; End Diastole (A4C): 3 47 cm  LV MOD Diam; Recent value; End Diastole (A4C): 3 84 cm  LV MOD Diam; Recent value; End Diastole (A4C): 4 08 cm  LV MOD Diam; Recent value; End Diastole (A4C): 4 36 cm  LV MOD Diam; Recent value; End Diastole (A4C): 4 56 cm  LV MOD Diam; Recent value; End Diastole (A4C): 4 53 cm  LV MOD Diam; Recent value; End Diastole (A4C): 4 68 cm  LV MOD Diam; Recent value; End Diastole (A4C): 4 8 cm  LV MOD Diam; Recent value; End Diastole (A4C): 5 05 cm  LV MOD Diam; Recent value; End Diastole (A4C): 5 13 cm  LV MOD Diam; Recent value; End Diastole (A4C): 5 09 cm  LV MOD Diam; Recent value; End Systole (A4C): 1 6 cm  LV MOD Diam; Recent value; End Systole (A4C): 3 28 cm  LV MOD Diam; Recent value; End Systole (A4C): 3 24 cm  LV MOD Diam; Recent value; End Systole (A4C): 3 12 cm  LV MOD Diam; Recent value; End Systole (A4C): 2 88 cm  LV MOD Diam; Recent value; End Systole (A4C): 1 92 cm  LV MOD Diam; Recent value; End Systole (A4C): 2 16 cm  LV MOD Diam; Recent value; End Systole (A4C): 2 32 cm  LV MOD Diam; Recent value; End Systole (A4C): 2 48 cm  LV MOD Diam; Recent value; End Systole (A4C): 2 6 cm  LV MOD Diam; Recent value; End Systole (A4C): 2 64 cm  LV MOD Diam; Recent value; End Systole (A4C): 2 72 cm  LV MOD Diam; Recent value; End Systole (A4C): 2 76 cm  LV MOD Diam; Recent value; End Systole (A4C): 2 84 cm  LV MOD Diam; Recent value; End Systole (A4C): 0 84 cm  LV MOD Diam; Recent value; End Systole (A4C): 1 32 cm  LV MOD Diam; Recent value; End Systole (A4C): 1 6 cm  LV MOD Diam; Recent value; End Systole (A4C): 3 cm  LV MOD Diam; Recent value; End Systole (A4C): 3 2 cm  LV MOD Diam; Recent value; End Systole (A4C): 3 28 cm  LVEF MOD A4C: 70 %  Left Ventricle diastolic major axis;  Most recent value chosen; Method of Disks, Single Plane; 2D mode; Apical four chamber;: 8 52 cm  Left Ventricle systolic major axis; Most recent value chosen; Method of Disks, Single Plane; 2D mode; Apical four chamber;: 7 21 cm  Left Ventricular Diastolic Area; Most recent value chosen; Method of Disks, Single Plane; 2D mode; Apical four chamber;: 3610 mm2  Left Ventricular End Diastolic Volume; Most recent value chosen; Method of Disks, Single Plane; 2D mode; Apical four chamber;: 126 cm3  Left Ventricular End Systolic Volume; Most recent value chosen; Method of Disks, Single Plane; 2D mode; Apical four chamber;: 38 cm3  Left Ventricular Systolic Area; Most recent value chosen; Method of Disks, Single Plane; 2D mode; Apical four chamber;: 1800 mm2  SI (A4C): 35 9 ml/m2  SV MOD A4C: 88 cm3    Tissue Doppler Imaging  LV Peak Early Scott Tissue Jose; Medial MA (TDI): 60 3 mm/s  Left Ventricular Peak Early Diastolic Tissue Velocity; Mean; Mean value chosen; Medial Mitral Annulus; Tissue Doppler Imaging;: 60 3 mm/s    Unspecified Scan Mode  MV Peak Jose/LV Peak Tissue Jose E-Wave; Medial MA: 17 4  DT; Antegrade Flow: 195 ms  DT; Mean; Antegrade Flow: 195 ms  MV A Jose: 1140 mm/s  MV E Jose: 1050 mm/s  MV E/A Ratio: 0 9  MV Peak A Jose: 1140 mm/s  MV Peak E Jose; Mean;  Antegrade Flow: 1050 mm/s    [employer] Accredited Echocardiography Laboratory    Prepared and electronically signed by    America Artis MD  Signed [id number] 08:20:16         Results for orders placed in visit on 09/09/15   Cardiac catheterization    Narrative [address]   Phone: [phone] LAB COMPLETE REPORT         Patient: Aura Malloy   Location: [facility] Room: [facility]   MR number: [id number]   Account number: [de-identified]   Study date: 09/10/2015   Gender: Male   : 1945   Height: 72 in   Weight: 241 8 lb   BSA: 2 31 m squared   Allergies: NKA   Diagnostic Cardiologist:  Lyn Salgado MD   Primary Physician:  Vishnu Thurston MD   SUMMARY   CORONARY CIRCULATION:   Left main: Normal    1st diagonal: There was a discrete 50 % stenosis at the ostium of the vessel   segment  CARDIAC STRUCTURES:   Analysis of regional contractile function demonstrated mild diaphragmatic   hypokinesis  base only Global left ventricular function was normal  EF   calculated by contrast ventriculography was 60 %  INDICATIONS:   --  Possible CAD: unstable angina, CCS class IV  PROCEDURES PERFORMED   --  Left heart catheterization with ventriculography  --  Left coronary angiography  --  Right coronary angiography  --  Coronary Catheterization (w/ LHC)  PROCEDURE: The risks and alternatives of the procedures and conscious sedation   were explained to the patient and informed consent was obtained  The patient   was brought to the cath lab and placed on the table  The planned puncture    sites   were prepped and draped in the usual sterile fashion  --  Right femoral artery access  The puncture site was infiltrated with local   INVASIVE CARDIOVASCULAR LAB COMPLETE REPORT   Patient: Fatuma Oliveira   MR number: [id number]    ------ Page 2   BSA: 2 31 m squared   anesthetic  The vessel was accessed using the modified Seldinger technique, a   wire was advanced into the vessel, and a sheath was advanced over the wire    into   the vessel  --  Left heart catheterization with ventriculography  A catheter was advanced   over a guidewire into the ascending aorta  After recording ascending aortic   pressure, the catheter was advanced across the aortic valve and left   ventricular pressure was recorded  Ventriculography was performed  The    catheter   was pulled back across the aortic valve and into the ascending aorta and   pullback pressures were obtained  --  Left coronary artery angiography   A catheter was advanced over a guidewire   into the aorta and positioned in the left coronary artery ostium under   fluoroscopic guidance  Angiography was performed  --  Right coronary artery angiography  A catheter was advanced over a    guidewire   into the aorta and positioned in the right coronary artery ostium under   fluoroscopic guidance  Angiography was performed  --  Coronary Catheterization (/ [facility])  PROCEDURE COMPLETION: The patient tolerated the procedure well and was   discharged from the cath lab  TIMING: Test started at 11:39  Test concluded at   12:19  HEMOSTASIS: The sheath was removed  The site was compressed manually  Hemostasis was obtained  MEDICATIONS GIVEN: Fentanyl (1OOmcg/2 ml), 50 mcg,    IV,   at 11:45  Versed (2mg/2ml), 1 mg, IV, at 11:45  1% Lidocaine, 9 ml,   subcutaneously, at 11:47  Fentanyl (1OOmcg/2 ml), 50 mcg, IV, at 11:57  Versed   (2mg/2ml), 1 mg, IV, at 11:57  CONTRAST GIVEN: 72 ml Omnipaque (350mg I /ml)  75 ml Omnipaque (350mg I /ml)  RADIATION EXPOSURE: Fluoroscopy time: 3 9 min  HEMODYNAMICS: Hemodynamic assessment demonstrated normal LVEDP  VENTRICLES:   --  Analysis of regional contractile function demonstrated mild   diaphragmatic hypokinesis  base only Global left ventricular function was   normal  EF calculated by contrast ventriculography was 60 %  VALVES:   AORTIC VALVE:   --  There was no aortic stenosis  MITRAL VALVE:   --  The mitral valve exhibited trivial regurgitation (less    than   1+)  CORONARY VESSELS:   --  The coronary circulation is right dominant  --  Left main: Normal    --  LAD: The vessel was medium to large sized and mildly calcified  Angiography   showed the vessel to wrap around the cardiac apex and mild atherosclerosis  There were three major diagonal branches  --  Proximal LAD: There was a 30 % stenosis at the origin of D1    --  1st diagonal: The vessel was medium sized  There was a discrete 50 %   stenosis at the ostium of the vessel segment   In a second lesion, there was a    0   % stenosis at the site of a prior stent  --  Circumflex: The vessel was small   INVASIVE CARDIOVASCULAR LAB COMPLETE REPORT   Patient: Kevin Armenta   MR number: [id number]    ------ Page 3   BSA: 2 31 m squared   to medium sized  Angiography showed mild atherosclerosis  There was one major   obtuse marginal    --  RCA: The vessel was medium sized  Angiography showed mild atherosclerosis  --  Proximal RCA: There was a discrete 30 % stenosis  --  Mid RCA: There was a 0 % stenosis at the site of a prior stent  --  Distal RCA: There was a 0 % stenosis at the site of a prior stent  AORTA:   --  The root exhibited normal size  --  Ascending aorta: Normal    Prepared and signed by   Steve Cristina MD   Signed 09/10/2015 12:46:40   Study diagram   Angiographic findings   Native coronary lesions:   ï¿½Proximal LAD: Lesion 1: 30 % stenosis  ï¿½D1: Lesion 1: discrete, 50 % stenosis  Lesion 2: 0 % stenosis, site of prior   stent  ï¿½Proximal RCA: Lesion 1: discrete, 30 % stenosis  ï¿½Mid RCA: Lesion 1: 0 % stenosis, site of prior stent  ï¿½Distal RCA: Lesion 1: 0 % stenosis, site of prior stent     Hemodynamic tables   Pressures:  Baseline   Pressures:  - HR: 82   Pressures:  - Rhythm:   Pressures:  -- Aortic Pressure (S/D/M): 143/62/88   Pressures:  -- Left Ventricle (s/edp): 143/23/--   Outputs:  Baseline   Outputs:  -- CALCULATIONS[age]   Outputs:  -- CALCULATIONS: Body Surface Area: 2 31   Outputs:  -- CALCULATIONS: Height in cm: 183 00   Outputs:  -- CALCULATIONS: Sex: Male   Outputs:  -- CALCULATIONS: Weight in k 90

## 2018-06-01 NOTE — PROGRESS NOTES
Met with patient at office visit with Dr Sandra Howard  Patient reports he is feeling better since hospital discharge  He brought his discharge paperwork and medication list with him as instructed  He is weighing himself daily and reports a 2-3 # weight loss  He reports he is being "more" compliant with diet, but admits that he does  food out when he goes to visit his wife, but is being more careful than before  He reports he is taking furosemide 40 mg only daily as that what it states on his bottle  Patient educated that his discharge paperwork has a change to his furosemide to 2X/day  At first he was reluctant to take bid, but after discussing with physician he is agreeable  He states he does not see any difference with his urinary symptoms since starting flomax, but he did make a follow up with urology  Overall he appears to be trying to be more engaged and compliant with self managing his HF and I acknowledged his efforts  I have communicated this information to patient's [facility]

## 2018-06-01 NOTE — PATIENT INSTRUCTIONS
Recommendations:  1  Increase lasix to 40mg 2x/day  2  Continue remainder of medications  3  Follow up in 2 months

## 2018-06-04 NOTE — TELEPHONE ENCOUNTER
Called home phone number [phone] and there was no answer, voicemail was not set up and I was unable to leave message  Called mobile phone number on account and it went straight to voicemail and a woman's voice stated to not leave a message at this time because it will not be returned

## 2018-06-06 NOTE — TELEPHONE ENCOUNTER
Spoke with Pt and confirmed appointment for 6/28/2018  Mailed paperwork to confirmed address on PT's account

## 2018-06-13 ENCOUNTER — TELEPHONE (OUTPATIENT)
Dept: ADMINISTRATIVE | Facility: HOSPITAL | Age: 73
End: 2018-06-13

## 2018-06-18 ENCOUNTER — OFFICE VISIT (OUTPATIENT)
Dept: PULMONOLOGY | Facility: CLINIC | Age: 73
End: 2018-06-18
Payer: COMMERCIAL

## 2018-06-18 VITALS
SYSTOLIC BLOOD PRESSURE: 96 MMHG | HEART RATE: 115 BPM | OXYGEN SATURATION: 95 % | DIASTOLIC BLOOD PRESSURE: 50 MMHG | TEMPERATURE: 98 F

## 2018-06-18 DIAGNOSIS — J44.9 CHRONIC OBSTRUCTIVE PULMONARY DISEASE, UNSPECIFIED COPD TYPE (HCC): Chronic | ICD-10-CM

## 2018-06-18 DIAGNOSIS — J96.21 ACUTE ON CHRONIC RESPIRATORY FAILURE WITH HYPOXIA AND HYPERCAPNIA (HCC): Primary | ICD-10-CM

## 2018-06-18 DIAGNOSIS — I50.32 CHRONIC DIASTOLIC HEART FAILURE (HCC): ICD-10-CM

## 2018-06-18 DIAGNOSIS — J96.22 ACUTE ON CHRONIC RESPIRATORY FAILURE WITH HYPOXIA AND HYPERCAPNIA (HCC): Primary | ICD-10-CM

## 2018-06-18 PROCEDURE — 99214 OFFICE O/P EST MOD 30 MIN: CPT | Performed by: NURSE PRACTITIONER

## 2018-06-18 NOTE — ASSESSMENT & PLAN NOTE
Does not appear to be in an exacerbation at this time  He maintains on a daily regimen of Lasix 40 mg and reports compliance  No wheezing on exam or crackles noted  No lower extremity edema  He will continue with his current medication regimen and follow up with his cardiologist as instructed  Chief Complaint: type 1 diabetes    Interval history: Pt seen in HD.  No acute complaints. Sugars at goal. She changed her pump site yesterday evening 8/20.    MEDICATIONS  (STANDING):  oxyCODONE    5 mG/acetaminophen 325 mG 1 Tablet(s) Oral at bedtime  aspirin enteric coated 81 milliGRAM(s) Oral daily  DULoxetine 60 milliGRAM(s) Oral daily  atorvastatin 20 milliGRAM(s) Oral at bedtime  clopidogrel Tablet 75 milliGRAM(s) Oral at bedtime  metoprolol succinate  milliGRAM(s) Oral at bedtime  cinacalcet 60 milliGRAM(s) Oral daily  sevelamer hydrochloride 2400 milliGRAM(s) Oral every 8 hours  hydrALAZINE 100 milliGRAM(s) Oral three times a day  multivitamin 1 Tablet(s) Oral daily  heparin  Injectable 5000 Unit(s) SubCutaneous every 12 hours  lisinopril 40 milliGRAM(s) Oral at bedtime  dextrose 5%. 1000 milliLiter(s) (50 mL/Hr) IV Continuous <Continuous>  dextrose 50% Injectable 12.5 Gram(s) IV Push once  dextrose 50% Injectable 25 Gram(s) IV Push once  dextrose 50% Injectable 25 Gram(s) IV Push once  insulin lispro (HumaLOG) Pump 1 Each SubCutaneous Continuous Pump    MEDICATIONS  (PRN):  dextrose Gel 1 Dose(s) Oral once PRN Blood Glucose LESS THAN 70 milliGRAM(s)/deciliter  glucagon  Injectable 1 milliGRAM(s) IntraMuscular once PRN Glucose LESS THAN 70 milligrams/deciliter      Allergies    No Known Allergies    Intolerances      Review of Systems:  Skin: no rash  Endocrine: no polyuria, polydipsia    ALL OTHER SYSTEMS REVIEWED AND NEGATIVE    PHYSICAL EXAM:  VITALS: T(C): 37.1 (08-21-17 @ 08:40)  T(F): 98.8 (08-21-17 @ 08:40), Max: 98.8 (08-21-17 @ 08:40)  HR: 67 (08-21-17 @ 08:40) (60 - 72)  BP: 154/69 (08-21-17 @ 08:40) (154/69 - 176/75)  RR:  (18 - 18)  SpO2:  (94% - 100%)  Wt(kg): --  GENERAL: NAD, well-developed  EYES: No proptosis, sclera anicteric  HEENT:  Atraumatic, Normocephalic, moist mucous membranes  SKIN: Dry, intact, No diffuse rashes   PSYCH: Alert and oriented x 3, normal affect, normal mood    CAPILLARY BLOOD GLUCOSE  192 (08-21 @ 07:21)  170 (08-20 @ 22:52)  139 (08-20 @ 21:40)  134 (08-20 @ 17:23)  154 (08-20 @ 11:23)  113 (08-20 @ 07:38)  204 (08-19 @ 21:51)  204 (08-19 @ 17:17)  134 (08-19 @ 13:12)  141 (08-19 @ 07:16)  103 (08-18 @ 22:27)  125 (08-18 @ 17:02)  140 (08-18 @ 11:35)  181 (08-18 @ 10:46)      08-20    137  |  96  |  12  ----------------------------<  95  4.1   |  25  |  3.37<H>    EGFR if : 16<L>  EGFR if non : 14<L>    Ca    7.7<L>      08-20            Thyroid Function Tests:      Hemoglobin A1C, Whole Blood: 6.8 % <H> [4.0 - 5.6] (07-24-17 @ 06:15)  Hemoglobin A1C, Whole Blood: 6.8 % <H> [4.0 - 5.6] (07-23-17 @ 22:45)  Hemoglobin A1C, Whole Blood: 7.3 % <H> [4.0 - 5.6] (06-16-17 @ 04:45)

## 2018-06-18 NOTE — ASSESSMENT & PLAN NOTE
He hasSevere COPD with FEV1 29% predicted at 1 02 L  He maintained on regimen of Spiriva daily, Symbicort b i d  And albuterol nebulizer b i d  He denies frequent use of rescue inhaler  He feels that his Chronic obstructive pulmonary disease is currently at his baseline  He will continue his current medication regimen and follow-up with Dr Ian Ryan  in 2-4 months      After Erminia Castleman left the office I was notified by staff there was a request for surgical clearance  Per Urology      Juan Mata is a high risk for any surgical procedure,  He becomes extremely dyspneic with walking requires 3-6 L nasal cannula to maintain oxygen saturation above 88%

## 2018-06-18 NOTE — TELEPHONE ENCOUNTER
This was sent to [facility]  Can you put this order in? Since you saw patient today?

## 2018-06-18 NOTE — PATIENT INSTRUCTIONS
Please call the office with any questions or concerns    Come to the Ed with any increase in SOB or wheezing

## 2018-06-18 NOTE — ASSESSMENT & PLAN NOTE
Gustavo Berman presents to the office today without a scheduled appointment  Requesting to be seen  He was noted to be short of breath hypoxic with  SpO2 of 74% on room air  He was taken back to exam room placed on 6 L nasal cannula recovered to an oxygen saturation of 96%  He was also noted to be dyspneic,  Which improved with rest     He  He has been instructed to wear least 3 L nasal cannula at all times  Although he is not compliant with his regimen  He reports only wearing 2 L while in his car due to  The short life of the portable tank  He also does not like to carry the large portable tanks ,  And does not use oxygen when ambulating to appointments or other activities outside of his home  The importance of wearing oxygen 24/7 was emphasized to Gustavo Berman  He verbalized understanding        He will use 3 L nasal cannula 24/7

## 2018-06-18 NOTE — PROGRESS NOTES
Pulmonary Follow Up Note   Christina Canales Sr  81 y o  male MRN: [id number]  6/18/2018      Assessment:    Acute on chronic respiratory failure with hypoxia and hypercapnia [facility])   Mi Adames presents to the office today without a scheduled appointment  Requesting to be seen  He was noted to be short of breath hypoxic with  SpO2 of 74% on room air  He was taken back to exam room placed on 6 L nasal cannula recovered to an oxygen saturation of 96%  He was also noted to be dyspneic,  Which improved with rest     He  He has been instructed to wear least 3 L nasal cannula at all times  Although he is not compliant with his regimen  He reports only wearing 2 L while in his car due to  The short life of the portable tank  He also does not like to carry the large portable tanks ,  And does not use oxygen when ambulating to appointments or other activities outside of his home  The importance of wearing oxygen 24/7 was emphasized to Mi Adames  He verbalized understanding  He will use 3 L nasal cannula 24/7    COPD (chronic obstructive pulmonary disease) ([facility] [address] )   He hasSevere COPD with FEV1 29% predicted at 1 02 L  He maintained on regimen of Spiriva daily, Symbicort b i d  And albuterol nebulizer b i d  He denies frequent use of rescue inhaler  He feels that his Chronic obstructive pulmonary disease is currently at his baseline  He will continue his current medication regimen and follow-up with Dr Susan Okeefe  in 2-4 months      After Mi Adames left the office I was notified by staff there was a request for surgical clearance  Per Urology  Jaja Lax is a high risk for any surgical procedure,  He becomes extremely dyspneic with walking requires 3-6 L nasal cannula to maintain oxygen saturation above 88%    Chronic diastolic heart failure ([address] )   Does not appear to be in an exacerbation at this time  He maintains on a daily regimen of Lasix 40 mg and reports compliance      No wheezing on exam or crackles noted  No lower extremity edema  He will continue with his current medication regimen and follow up with his cardiologist as instructed  Plan:    Diagnoses and all orders for this visit:    Acute on chronic respiratory failure with hypoxia and hypercapnia (HCC)    Chronic diastolic heart failure (HCC)    [facility], [facility]        Return in about 3 months (around 9/18/2018)  History of Present Illness   HPI:  Arsh Pfeiffer Sr  is a 81 y o  male who  Presented to the office today without appointment, patient reports he thought he had appointment today  He missed a follow-up appointment on June 6, 2018  He was most recently admitted to the hospital 05/23/2018-5/26/2018  Acute on chronic CHF  His most recent admission for Chronic obstructive pulmonary disease exacerbation was in April of this past year  This was last time that he was placed on prednisone  He was notably dyspnea and well as hypoxic upon  Entering the office,   Although he was not wearing his oxygen as prescribed  He was initially found to be 74% on room air, 6 L nasal cannula was placed patient responded  And SpO2 improved to 94% on 6 L  He was reduced to 3 L nasal cannula which is his baseline  His dyspnea also improved with rest    He reports that this is his baseline  He denies any increase in shortness of breath or increasing dyspnea on exertion  He reports a daily cough but denies any sputum production  He currently denies:  Chest pain  Pain with inspiration, fevers, chills, night sweats, nausea, vomiting diarrhea, headache, dizziness, bronchospasm hemoptysis  He does not appear to be volume overloaded at this time  He is following with Cardiology and was seen on 06/01/2018        he denies any symptoms of dysphagia or current untreated GERD  At the appointment he refused to do 6 minutes walk as he felt tired      He denies current tobacco abuse    He does have  Portable oxygen but feels that it is too cumbersome  Due to his high oxygen demand,  A small portable device will not likely be beneficial     Review of Systems   Constitutional: Positive for diaphoresis and fatigue  Negative for activity change, appetite change and unexpected weight change  HENT: Negative for postnasal drip, rhinorrhea, sinus pain, sinus pressure, sneezing, sore throat and trouble swallowing  Eyes: Negative for discharge, redness and itching  Respiratory: Positive for shortness of breath  Negative for cough, choking, chest tightness and wheezing  Cardiovascular: Negative for chest pain and leg swelling  Gastrointestinal: Negative for diarrhea and nausea  Endocrine: Negative for cold intolerance  Genitourinary: Positive for difficulty urinating  Musculoskeletal: Negative for arthralgias and joint swelling  Allergic/Immunologic: Negative for environmental allergies and immunocompromised state  Neurological: Negative for dizziness  Hematological: Does not bruise/bleed easily  Psychiatric/Behavioral: Negative for agitation  All other systems reviewed and are negative  Historical Information   Past Medical History:   Diagnosis Date    Abdominal pain     Cardiac disease     [facility]     COPD [facility]chronic obstructive [facility]) [facility]     Coronary artery disease     Diabetes mellitus ([facility] )     Hyperlipidemia     Hypertension     [facility]     with 3 stents    Prostate cancer ([facility] )      Past Surgical History:   Procedure Laterality Date    ABDOMINAL SURGERY      exploratory    ANGIOPLASTY      3 stents    ESOPHAGOGASTRODUODENOSCOPY N/A 10/2/2017    Procedure: ESOPHAGOGASTRODUODENOSCOPY (EGD); Surgeon: Alice Sloan MD;  Location: [facility];   Service: Gastroenterology    PROSTATE SURGERY       Family History   Problem Relation Age of Onset    Heart disease Father        History Smoking Status    Former Smoker    Packs/day: 1 50    Years: 50 00    Quit date: 9/13/2017   Smokeless Tobacco    Never Used         Meds/Allergies     Current Outpatient Prescriptions:     albuterol (2 5 mg/3 mL) 0 083 % nebulizer solution, Inhale 1 each, Disp: , Rfl:     aspirin (ECOTRIN LOW STRENGTH) 81 mg EC tablet, Take 1 tablet by mouth daily, Disp: , Rfl:     budesonide-formoterol (SYMBICORT) 160-4 5 mcg/act inhaler, Inhale 2 puffs 2 (two) times a day, Disp: , Rfl:     ferrous sulfate 325 (65 Fe) mg tablet, Take 325 mg by mouth daily with breakfast, Disp: , Rfl:     fluticasone (FLONASE) 50 mcg/act nasal spray, 1 spray into each nostril daily, Disp: 16 g, Rfl: 0    furosemide (LASIX) 40 mg tablet, Take 1 tablet (40 mg total) by mouth 2 (two) times a day, Disp: 60 tablet, Rfl: 0    insulin regular (HumuLIN R U-500) 500 units/mL CONCENTRATED injection, Inject 0 09 mL (45 Units total) under the skin 2 (two) times a day before breakfast and lunch, Disp: 10 mL, Rfl: 0    insulin regular (HumuLIN R U-500) 500 units/mL CONCENTRATED injection, Inject 0 05 mL (25 Units total) under the skin daily before dinner, Disp: 10 mL, Rfl: 0    Insulin Syringe/Needle U-500 (BD INSULIN SYRINGE U-500) 31G X 6MM 0 5 ML MISC, by Does not apply route, Disp: , Rfl:     lisinopril (ZESTRIL) 10 mg tablet, Take 10 mg by mouth daily Patient unsure of dose of lisinopril , Disp: , Rfl:     metoprolol tartrate (LOPRESSOR) 25 mg tablet, Take 1 tablet by mouth 2 (two) times a day, Disp: , Rfl:     Omega-3 Fatty Acids (FISH OIL) 645 MG CAPS, Take 1 capsule by mouth 2 (two) times a day, Disp: , Rfl:     oxyCODONE-acetaminophen (PERCOCET) 7 5-325 MG per tablet, Take by mouth every 4 (four) hours, Disp: , Rfl:     pantoprazole (PROTONIX) 40 mg tablet, Take 1 tablet by mouth 2 (two) times a day before meals, Disp: 60 tablet, Rfl: 0    potassium chloride (K-DUR,KLOR-CON) 20 mEq tablet, Take 1 tablet (20 mEq total) by mouth 2 (two) times a day, Disp: 60 tablet, Rfl: 0    simvastatin (ZOCOR) 40 mg tablet, Take 40 mg by mouth daily, Disp: , Rfl: 5    tamsulosin (FLOMAX) 0 4 mg, Take 1 capsule (0 4 mg total) by mouth daily with dinner, Disp: 30 capsule, Rfl: 0    tiotropium (SPIRIVA) 18 mcg inhalation capsule, Place 18 mcg into inhaler and inhale daily, Disp: , Rfl:   Allergies   Allergen Reactions    Metformin GI Intolerance       Vitals: Blood pressure 96/50, pulse (!) 115, temperature 98 °F (36 7 °C), temperature source Tympanic, SpO2 95 %  There is no height or weight on file to calculate BMI  Oxygen Therapy  SpO2: 95 %  Oxygen Therapy: Supplemental oxygen  O2 Delivery Method: Nasal cannula  O2 Flow Rate (L/min): 3 L/min    Physical Exam  Physical Exam   Constitutional: He is oriented to person, place, and time  He appears well-developed and well-nourished  He appears distressed (mild distress, improved with rest)  HENT:   Head: Normocephalic and atraumatic  Eyes: EOM are normal  Pupils are equal, round, and reactive to light  Neck: Normal range of motion  Neck supple  No JVD present  Cardiovascular: Normal rate, regular rhythm, normal heart sounds and intact distal pulses  Exam reveals no gallop and no friction rub  No murmur heard  Pulmonary/Chest: Effort normal  No respiratory distress  He has no decreased breath sounds  He has no wheezes  He has no rhonchi  He has no rales  He exhibits no tenderness  Abdominal: Soft  Bowel sounds are normal    Musculoskeletal: Normal range of motion  He exhibits no edema  Neurological: He is alert and oriented to person, place, and time  Skin: Skin is warm and dry  Vitals reviewed  Labs: I have personally reviewed pertinent lab results    Lab Results   Component Value Date    WBC 8 39 05/24/2018    HGB 12 4 05/24/2018    HCT 40 9 05/24/2018    MCV 90 05/24/2018     (L) 05/24/2018     Lab Results   Component Value Date    GLUCOSE 216 (H) 05/26/2018    CALCIUM 8 5 05/26/2018     (L) 05/26/2018    K 4 4 05/26/2018    CO2 39 (H) 05/26/2018    CL 93 (L) 05/26/2018    BUN 29 (H) 05/26/2018    CREATININE 1 37 (H) 05/26/2018     No results found for: IGE  Lab Results   Component Value Date    ALT 36 05/24/2018    AST 34 05/24/2018    ALKPHOS 82 05/24/2018    BILITOT 0 31 05/24/2018       Imaging and other studies: I have personally reviewed pertinent reports  and I have personally reviewed pertinent films in PACS   V/Q scan 05/24/2018:   Intermediate/indeterminate probability for PE     chest x-ray 05/23/2018 demonstrated small right pleural effusion     venous duplex study  05/23/2018 was negative for acute DVT    Pulmonary function testing: Severe COPD with FEV1 of 29% predicted

## 2018-06-19 ENCOUNTER — HOSPITAL ENCOUNTER (INPATIENT)
Facility: HOSPITAL | Age: 73
LOS: 2 days | Discharge: HOME HEALTH | DRG: 871 | End: 2018-06-21
Attending: EMERGENCY MEDICINE | Admitting: EMERGENCY MEDICINE
Payer: COMMERCIAL

## 2018-06-19 ENCOUNTER — APPOINTMENT (EMERGENCY)
Dept: RADIOLOGY | Facility: HOSPITAL | Age: 73
DRG: 871 | End: 2018-06-19
Payer: COMMERCIAL

## 2018-06-19 ENCOUNTER — APPOINTMENT (INPATIENT)
Dept: NON INVASIVE DIAGNOSTICS | Facility: HOSPITAL | Age: 73
DRG: 871 | End: 2018-06-19
Payer: COMMERCIAL

## 2018-06-19 ENCOUNTER — DOCUMENTATION (OUTPATIENT)
Dept: PULMONOLOGY | Facility: CLINIC | Age: 73
End: 2018-06-19

## 2018-06-19 ENCOUNTER — APPOINTMENT (INPATIENT)
Dept: RADIOLOGY | Facility: HOSPITAL | Age: 73
DRG: 871 | End: 2018-06-19
Payer: COMMERCIAL

## 2018-06-19 DIAGNOSIS — J44.9 CHRONIC OBSTRUCTIVE PULMONARY DISEASE, UNSPECIFIED COPD TYPE (HCC): Primary | ICD-10-CM

## 2018-06-19 DIAGNOSIS — N17.9 AKI (ACUTE KIDNEY INJURY) (HCC): ICD-10-CM

## 2018-06-19 DIAGNOSIS — D72.829 LEUKOCYTOSIS: ICD-10-CM

## 2018-06-19 DIAGNOSIS — R57.9 SHOCK (HCC): Primary | ICD-10-CM

## 2018-06-19 DIAGNOSIS — T07.XXXA ABRASIONS OF MULTIPLE SITES: ICD-10-CM

## 2018-06-19 DIAGNOSIS — E66.9 OBESITY (BMI 30-39.9): Chronic | ICD-10-CM

## 2018-06-19 DIAGNOSIS — R79.89 ELEVATED LACTIC ACID LEVEL: ICD-10-CM

## 2018-06-19 PROBLEM — E87.29 RESPIRATORY ACIDOSIS: Status: ACTIVE | Noted: 2018-06-19

## 2018-06-19 PROBLEM — R57.1 HYPOVOLEMIC SHOCK (HCC): Status: ACTIVE | Noted: 2018-06-19

## 2018-06-19 PROBLEM — R06.89 HYPERCAPNIA: Status: ACTIVE | Noted: 2018-06-19

## 2018-06-19 PROBLEM — E87.2 LACTIC ACIDOSIS: Status: ACTIVE | Noted: 2018-06-19

## 2018-06-19 PROBLEM — E87.2 RESPIRATORY ACIDOSIS: Status: ACTIVE | Noted: 2018-06-19

## 2018-06-19 PROBLEM — E87.20 LACTIC ACIDOSIS: Status: ACTIVE | Noted: 2018-06-19

## 2018-06-19 PROBLEM — I45.10 RBBB: Status: ACTIVE | Noted: 2018-06-19

## 2018-06-19 PROBLEM — G93.41 ACUTE METABOLIC ENCEPHALOPATHY: Status: ACTIVE | Noted: 2018-06-19

## 2018-06-19 LAB
ALBUMIN SERPL BCP-MCNC: 3 G/DL (ref 3.5–5)
ALP SERPL-CCNC: 92 U/L (ref 46–116)
ALT SERPL W P-5'-P-CCNC: 37 U/L (ref 12–78)
AMMONIA PLAS-SCNC: 15 UMOL/L (ref 11–35)
ANION GAP SERPL CALCULATED.3IONS-SCNC: 2 MMOL/L (ref 4–13)
ANION GAP SERPL CALCULATED.3IONS-SCNC: 2 MMOL/L (ref 4–13)
ANION GAP SERPL CALCULATED.3IONS-SCNC: 9 MMOL/L (ref 4–13)
APTT PPP: 30 SECONDS (ref 24–36)
ARTERIAL PATENCY WRIST A: YES
AST SERPL W P-5'-P-CCNC: 29 U/L (ref 5–45)
ATRIAL RATE: 78 BPM
ATRIAL RATE: 86 BPM
ATRIAL RATE: 93 BPM
BASE EX.OXY STD BLDV CALC-SCNC: 24.4 % (ref 60–80)
BASE EX.OXY STD BLDV CALC-SCNC: 59.8 % (ref 60–80)
BASE EX.OXY STD BLDV CALC-SCNC: 79.1 % (ref 60–80)
BASE EX.OXY STD BLDV CALC-SCNC: 81.9 % (ref 60–80)
BASE EXCESS BLDA CALC-SCNC: 2.6 MMOL/L
BASE EXCESS BLDA CALC-SCNC: 3 MMOL/L (ref -2–3)
BASE EXCESS BLDV CALC-SCNC: 0.7 MMOL/L
BASE EXCESS BLDV CALC-SCNC: 1.4 MMOL/L
BASE EXCESS BLDV CALC-SCNC: 2.7 MMOL/L
BASE EXCESS BLDV CALC-SCNC: 3.2 MMOL/L
BASOPHILS # BLD AUTO: 0.02 THOUSANDS/ΜL (ref 0–0.1)
BASOPHILS # BLD AUTO: 0.05 THOUSANDS/ΜL (ref 0–0.1)
BASOPHILS NFR BLD AUTO: 0 % (ref 0–1)
BASOPHILS NFR BLD AUTO: 0 % (ref 0–1)
BILIRUB SERPL-MCNC: 0.37 MG/DL (ref 0.2–1)
BILIRUB UR QL STRIP: NEGATIVE
BUN SERPL-MCNC: 54 MG/DL (ref 5–25)
BUN SERPL-MCNC: 57 MG/DL (ref 5–25)
BUN SERPL-MCNC: 59 MG/DL (ref 5–25)
CA-I BLD-SCNC: 1.08 MMOL/L (ref 1.12–1.32)
CALCIUM SERPL-MCNC: 7.5 MG/DL (ref 8.3–10.1)
CALCIUM SERPL-MCNC: 7.6 MG/DL (ref 8.3–10.1)
CALCIUM SERPL-MCNC: 8.5 MG/DL (ref 8.3–10.1)
CHLORIDE SERPL-SCNC: 102 MMOL/L (ref 100–108)
CHLORIDE SERPL-SCNC: 103 MMOL/L (ref 100–108)
CHLORIDE SERPL-SCNC: 98 MMOL/L (ref 100–108)
CLARITY UR: CLEAR
CO2 SERPL-SCNC: 32 MMOL/L (ref 21–32)
CO2 SERPL-SCNC: 33 MMOL/L (ref 21–32)
CO2 SERPL-SCNC: 33 MMOL/L (ref 21–32)
COLOR UR: YELLOW
COLOR, POC: NORMAL
CREAT SERPL-MCNC: 2.3 MG/DL (ref 0.6–1.3)
CREAT SERPL-MCNC: 2.53 MG/DL (ref 0.6–1.3)
CREAT SERPL-MCNC: 2.64 MG/DL (ref 0.6–1.3)
EOSINOPHIL # BLD AUTO: 0.01 THOUSAND/ΜL (ref 0–0.61)
EOSINOPHIL # BLD AUTO: 0.06 THOUSAND/ΜL (ref 0–0.61)
EOSINOPHIL NFR BLD AUTO: 0 % (ref 0–6)
EOSINOPHIL NFR BLD AUTO: 0 % (ref 0–6)
ERYTHROCYTE [DISTWIDTH] IN BLOOD BY AUTOMATED COUNT: 15.2 % (ref 11.6–15.1)
ERYTHROCYTE [DISTWIDTH] IN BLOOD BY AUTOMATED COUNT: 15.2 % (ref 11.6–15.1)
GFR SERPL CREATININE-BSD FRML MDRD: 23 ML/MIN/1.73SQ M
GFR SERPL CREATININE-BSD FRML MDRD: 25 ML/MIN/1.73SQ M
GFR SERPL CREATININE-BSD FRML MDRD: 28 ML/MIN/1.73SQ M
GLUCOSE SERPL-MCNC: 107 MG/DL (ref 65–140)
GLUCOSE SERPL-MCNC: 120 MG/DL (ref 65–140)
GLUCOSE SERPL-MCNC: 121 MG/DL (ref 65–140)
GLUCOSE SERPL-MCNC: 121 MG/DL (ref 65–140)
GLUCOSE SERPL-MCNC: 135 MG/DL (ref 65–140)
GLUCOSE SERPL-MCNC: 135 MG/DL (ref 65–140)
GLUCOSE SERPL-MCNC: 141 MG/DL (ref 65–140)
GLUCOSE SERPL-MCNC: 150 MG/DL (ref 65–140)
GLUCOSE SERPL-MCNC: 165 MG/DL (ref 65–140)
GLUCOSE SERPL-MCNC: 253 MG/DL (ref 65–140)
GLUCOSE SERPL-MCNC: 37 MG/DL (ref 65–140)
GLUCOSE SERPL-MCNC: 40 MG/DL (ref 65–140)
GLUCOSE UR STRIP-MCNC: NEGATIVE MG/DL
HCO3 BLDA-SCNC: 30.5 MMOL/L (ref 22–28)
HCO3 BLDA-SCNC: 31.3 MMOL/L (ref 24–30)
HCO3 BLDV-SCNC: 30.6 MMOL/L (ref 24–30)
HCO3 BLDV-SCNC: 31.7 MMOL/L (ref 24–30)
HCO3 BLDV-SCNC: 32.5 MMOL/L (ref 24–30)
HCO3 BLDV-SCNC: 34.2 MMOL/L (ref 24–30)
HCT VFR BLD AUTO: 35.1 % (ref 36.5–49.3)
HCT VFR BLD AUTO: 39.8 % (ref 36.5–49.3)
HCT VFR BLD CALC: 28 % (ref 36.5–49.3)
HGB BLD-MCNC: 10.6 G/DL (ref 12–17)
HGB BLD-MCNC: 12.3 G/DL (ref 12–17)
HGB BLDA-MCNC: 9.5 G/DL (ref 12–17)
HGB UR QL STRIP.AUTO: NEGATIVE
IMM GRANULOCYTES # BLD AUTO: 0.16 THOUSAND/UL (ref 0–0.2)
IMM GRANULOCYTES # BLD AUTO: 0.25 THOUSAND/UL (ref 0–0.2)
IMM GRANULOCYTES NFR BLD AUTO: 1 % (ref 0–2)
IMM GRANULOCYTES NFR BLD AUTO: 1 % (ref 0–2)
INR PPP: 1.09 (ref 0.86–1.17)
KETONES UR STRIP-MCNC: NEGATIVE MG/DL
LACTATE SERPL-SCNC: 1.7 MMOL/L (ref 0.5–2)
LACTATE SERPL-SCNC: 2.1 MMOL/L (ref 0.5–2)
LACTATE SERPL-SCNC: 2.6 MMOL/L (ref 0.5–2)
LACTATE SERPL-SCNC: 5.7 MMOL/L (ref 0.5–2)
LEUKOCYTE ESTERASE UR QL STRIP: NEGATIVE
LYMPHOCYTES # BLD AUTO: 0.56 THOUSANDS/ΜL (ref 0.6–4.47)
LYMPHOCYTES # BLD AUTO: 1.14 THOUSANDS/ΜL (ref 0.6–4.47)
LYMPHOCYTES NFR BLD AUTO: 4 % (ref 14–44)
LYMPHOCYTES NFR BLD AUTO: 6 % (ref 14–44)
MAGNESIUM SERPL-MCNC: 2.5 MG/DL (ref 1.6–2.6)
MCH RBC QN AUTO: 28.1 PG (ref 26.8–34.3)
MCH RBC QN AUTO: 28.2 PG (ref 26.8–34.3)
MCHC RBC AUTO-ENTMCNC: 30.2 G/DL (ref 31.4–37.4)
MCHC RBC AUTO-ENTMCNC: 30.9 G/DL (ref 31.4–37.4)
MCV RBC AUTO: 91 FL (ref 82–98)
MCV RBC AUTO: 93 FL (ref 82–98)
MONOCYTES # BLD AUTO: 0.85 THOUSAND/ΜL (ref 0.17–1.22)
MONOCYTES # BLD AUTO: 1.24 THOUSAND/ΜL (ref 0.17–1.22)
MONOCYTES NFR BLD AUTO: 6 % (ref 4–12)
MONOCYTES NFR BLD AUTO: 6 % (ref 4–12)
NASAL CANNULA: 3
NASAL CANNULA: 3
NEUTROPHILS # BLD AUTO: 13.68 THOUSANDS/ΜL (ref 1.85–7.62)
NEUTROPHILS # BLD AUTO: 16.86 THOUSANDS/ΜL (ref 1.85–7.62)
NEUTS SEG NFR BLD AUTO: 87 % (ref 43–75)
NEUTS SEG NFR BLD AUTO: 89 % (ref 43–75)
NITRITE UR QL STRIP: NEGATIVE
NRBC BLD AUTO-RTO: 0 /100 WBCS
NRBC BLD AUTO-RTO: 0 /100 WBCS
NT-PROBNP SERPL-MCNC: 356 PG/ML
O2 CT BLDA-SCNC: 14.7 ML/DL (ref 16–23)
O2 CT BLDV-SCNC: 11.1 ML/DL
O2 CT BLDV-SCNC: 12.2 ML/DL
O2 CT BLDV-SCNC: 13 ML/DL
O2 CT BLDV-SCNC: 3.9 ML/DL
OXYHGB MFR BLDA: 92.9 % (ref 94–97)
P AXIS: 0 DEGREES
P AXIS: 110 DEGREES
P AXIS: 120 DEGREES
PCO2 BLD: 33 MMOL/L (ref 21–32)
PCO2 BLD: 70.3 MM HG (ref 42–50)
PCO2 BLDA: 64.8 MM HG (ref 36–44)
PCO2 BLDV: 76 MM HG (ref 42–50)
PCO2 BLDV: 80.2 MM HG (ref 42–50)
PCO2 BLDV: 90 MM HG (ref 42–50)
PCO2 BLDV: 96.5 MM HG (ref 42–50)
PH BLD: 7.26 [PH] (ref 7.3–7.4)
PH BLDA: 7.29 [PH] (ref 7.35–7.45)
PH BLDV: 7.17 [PH] (ref 7.3–7.4)
PH BLDV: 7.17 [PH] (ref 7.3–7.4)
PH BLDV: 7.2 [PH] (ref 7.3–7.4)
PH BLDV: 7.24 [PH] (ref 7.3–7.4)
PH UR STRIP.AUTO: 5.5 [PH] (ref 4.5–8)
PHOSPHATE SERPL-MCNC: 4 MG/DL (ref 2.3–4.1)
PLATELET # BLD AUTO: 136 THOUSANDS/UL (ref 149–390)
PLATELET # BLD AUTO: 146 THOUSANDS/UL (ref 149–390)
PLATELET # BLD AUTO: 191 THOUSANDS/UL (ref 149–390)
PMV BLD AUTO: 10.4 FL (ref 8.9–12.7)
PMV BLD AUTO: 10.4 FL (ref 8.9–12.7)
PMV BLD AUTO: 10.6 FL (ref 8.9–12.7)
PO2 BLD: 165 MM HG (ref 35–45)
PO2 BLDA: 79.1 MM HG (ref 75–129)
PO2 BLDV: 19.6 MM HG (ref 35–45)
PO2 BLDV: 38.5 MM HG (ref 35–45)
PO2 BLDV: 52.1 MM HG (ref 35–45)
PO2 BLDV: 57.3 MM HG (ref 35–45)
POTASSIUM BLD-SCNC: 4.7 MMOL/L (ref 3.5–5.3)
POTASSIUM SERPL-SCNC: 4.5 MMOL/L (ref 3.5–5.3)
POTASSIUM SERPL-SCNC: 5.4 MMOL/L (ref 3.5–5.3)
POTASSIUM SERPL-SCNC: 5.4 MMOL/L (ref 3.5–5.3)
PR INTERVAL: 152 MS
PR INTERVAL: 168 MS
PR INTERVAL: 176 MS
PROCALCITONIN SERPL-MCNC: 0.22 NG/ML
PROCALCITONIN SERPL-MCNC: 0.45 NG/ML
PROT SERPL-MCNC: 7.7 G/DL (ref 6.4–8.2)
PROT UR STRIP-MCNC: NEGATIVE MG/DL
PROTHROMBIN TIME: 14.2 SECONDS (ref 11.8–14.2)
QRS AXIS: 101 DEGREES
QRS AXIS: 82 DEGREES
QRS AXIS: 92 DEGREES
QRSD INTERVAL: 130 MS
QRSD INTERVAL: 134 MS
QRSD INTERVAL: 136 MS
QT INTERVAL: 372 MS
QT INTERVAL: 396 MS
QT INTERVAL: 406 MS
QTC INTERVAL: 462 MS
QTC INTERVAL: 462 MS
QTC INTERVAL: 473 MS
RBC # BLD AUTO: 3.76 MILLION/UL (ref 3.88–5.62)
RBC # BLD AUTO: 4.38 MILLION/UL (ref 3.88–5.62)
SAO2 % BLD FROM PO2: 99 % (ref 95–98)
SODIUM BLD-SCNC: 139 MMOL/L (ref 136–145)
SODIUM SERPL-SCNC: 137 MMOL/L (ref 136–145)
SODIUM SERPL-SCNC: 138 MMOL/L (ref 136–145)
SODIUM SERPL-SCNC: 139 MMOL/L (ref 136–145)
SP GR UR STRIP.AUTO: 1.01 (ref 1–1.03)
SPECIMEN SOURCE: ABNORMAL
SPECIMEN SOURCE: ABNORMAL
T WAVE AXIS: 18 DEGREES
T WAVE AXIS: 39 DEGREES
T WAVE AXIS: 52 DEGREES
TROPONIN I SERPL-MCNC: <0.02 NG/ML
TROPONIN I SERPL-MCNC: <0.02 NG/ML
TSH SERPL DL<=0.05 MIU/L-ACNC: 1.81 UIU/ML (ref 0.36–3.74)
UROBILINOGEN UR QL STRIP.AUTO: 0.2 E.U./DL
VENTRICULAR RATE: 78 BPM
VENTRICULAR RATE: 86 BPM
VENTRICULAR RATE: 93 BPM
WBC # BLD AUTO: 15.28 THOUSAND/UL (ref 4.31–10.16)
WBC # BLD AUTO: 19.6 THOUSAND/UL (ref 4.31–10.16)

## 2018-06-19 PROCEDURE — 93321 DOPPLER ECHO F-UP/LMTD STD: CPT | Performed by: INTERNAL MEDICINE

## 2018-06-19 PROCEDURE — 84100 ASSAY OF PHOSPHORUS: CPT | Performed by: NURSE PRACTITIONER

## 2018-06-19 PROCEDURE — 84443 ASSAY THYROID STIM HORMONE: CPT | Performed by: EMERGENCY MEDICINE

## 2018-06-19 PROCEDURE — 82948 REAGENT STRIP/BLOOD GLUCOSE: CPT

## 2018-06-19 PROCEDURE — 36415 COLL VENOUS BLD VENIPUNCTURE: CPT | Performed by: EMERGENCY MEDICINE

## 2018-06-19 PROCEDURE — 87147 CULTURE TYPE IMMUNOLOGIC: CPT | Performed by: EMERGENCY MEDICINE

## 2018-06-19 PROCEDURE — 82805 BLOOD GASES W/O2 SATURATION: CPT | Performed by: EMERGENCY MEDICINE

## 2018-06-19 PROCEDURE — 94640 AIRWAY INHALATION TREATMENT: CPT | Performed by: SOCIAL WORKER

## 2018-06-19 PROCEDURE — 36600 WITHDRAWAL OF ARTERIAL BLOOD: CPT | Performed by: SOCIAL WORKER

## 2018-06-19 PROCEDURE — 83880 ASSAY OF NATRIURETIC PEPTIDE: CPT | Performed by: EMERGENCY MEDICINE

## 2018-06-19 PROCEDURE — 83735 ASSAY OF MAGNESIUM: CPT | Performed by: NURSE PRACTITIONER

## 2018-06-19 PROCEDURE — 36569 INSJ PICC 5 YR+ W/O IMAGING: CPT

## 2018-06-19 PROCEDURE — 87040 BLOOD CULTURE FOR BACTERIA: CPT | Performed by: EMERGENCY MEDICINE

## 2018-06-19 PROCEDURE — 93308 TTE F-UP OR LMTD: CPT | Performed by: INTERNAL MEDICINE

## 2018-06-19 PROCEDURE — 93308 TTE F-UP OR LMTD: CPT

## 2018-06-19 PROCEDURE — 84145 PROCALCITONIN (PCT): CPT | Performed by: EMERGENCY MEDICINE

## 2018-06-19 PROCEDURE — 85014 HEMATOCRIT: CPT

## 2018-06-19 PROCEDURE — 85610 PROTHROMBIN TIME: CPT | Performed by: EMERGENCY MEDICINE

## 2018-06-19 PROCEDURE — 02HV33Z INSERTION OF INFUSION DEVICE INTO SUPERIOR VENA CAVA, PERCUTANEOUS APPROACH: ICD-10-PCS | Performed by: INTERNAL MEDICINE

## 2018-06-19 PROCEDURE — 85049 AUTOMATED PLATELET COUNT: CPT | Performed by: NURSE PRACTITIONER

## 2018-06-19 PROCEDURE — 84484 ASSAY OF TROPONIN QUANT: CPT | Performed by: EMERGENCY MEDICINE

## 2018-06-19 PROCEDURE — 96361 HYDRATE IV INFUSION ADD-ON: CPT

## 2018-06-19 PROCEDURE — 96375 TX/PRO/DX INJ NEW DRUG ADDON: CPT

## 2018-06-19 PROCEDURE — 71046 X-RAY EXAM CHEST 2 VIEWS: CPT

## 2018-06-19 PROCEDURE — 93010 ELECTROCARDIOGRAM REPORT: CPT | Performed by: INTERNAL MEDICINE

## 2018-06-19 PROCEDURE — 82805 BLOOD GASES W/O2 SATURATION: CPT | Performed by: NURSE PRACTITIONER

## 2018-06-19 PROCEDURE — 72125 CT NECK SPINE W/O DYE: CPT

## 2018-06-19 PROCEDURE — 85025 COMPLETE CBC W/AUTO DIFF WBC: CPT

## 2018-06-19 PROCEDURE — 83605 ASSAY OF LACTIC ACID: CPT | Performed by: EMERGENCY MEDICINE

## 2018-06-19 PROCEDURE — 70450 CT HEAD/BRAIN W/O DYE: CPT

## 2018-06-19 PROCEDURE — 94640 AIRWAY INHALATION TREATMENT: CPT

## 2018-06-19 PROCEDURE — 94760 N-INVAS EAR/PLS OXIMETRY 1: CPT

## 2018-06-19 PROCEDURE — 84132 ASSAY OF SERUM POTASSIUM: CPT

## 2018-06-19 PROCEDURE — 80048 BASIC METABOLIC PNL TOTAL CA: CPT | Performed by: EMERGENCY MEDICINE

## 2018-06-19 PROCEDURE — C1751 CATH, INF, PER/CENT/MIDLINE: HCPCS

## 2018-06-19 PROCEDURE — 82947 ASSAY GLUCOSE BLOOD QUANT: CPT

## 2018-06-19 PROCEDURE — 71045 X-RAY EXAM CHEST 1 VIEW: CPT

## 2018-06-19 PROCEDURE — 80053 COMPREHEN METABOLIC PANEL: CPT | Performed by: EMERGENCY MEDICINE

## 2018-06-19 PROCEDURE — 74176 CT ABD & PELVIS W/O CONTRAST: CPT

## 2018-06-19 PROCEDURE — 93005 ELECTROCARDIOGRAM TRACING: CPT

## 2018-06-19 PROCEDURE — 82803 BLOOD GASES ANY COMBINATION: CPT

## 2018-06-19 PROCEDURE — 84295 ASSAY OF SERUM SODIUM: CPT

## 2018-06-19 PROCEDURE — 99291 CRITICAL CARE FIRST HOUR: CPT | Performed by: INTERNAL MEDICINE

## 2018-06-19 PROCEDURE — 82140 ASSAY OF AMMONIA: CPT | Performed by: EMERGENCY MEDICINE

## 2018-06-19 PROCEDURE — 83605 ASSAY OF LACTIC ACID: CPT | Performed by: NURSE PRACTITIONER

## 2018-06-19 PROCEDURE — 82330 ASSAY OF CALCIUM: CPT

## 2018-06-19 PROCEDURE — 85025 COMPLETE CBC W/AUTO DIFF WBC: CPT | Performed by: EMERGENCY MEDICINE

## 2018-06-19 PROCEDURE — 84145 PROCALCITONIN (PCT): CPT | Performed by: NURSE PRACTITIONER

## 2018-06-19 PROCEDURE — 96374 THER/PROPH/DIAG INJ IV PUSH: CPT

## 2018-06-19 PROCEDURE — 80048 BASIC METABOLIC PNL TOTAL CA: CPT | Performed by: NURSE PRACTITIONER

## 2018-06-19 PROCEDURE — 99285 EMERGENCY DEPT VISIT HI MDM: CPT

## 2018-06-19 PROCEDURE — 71250 CT THORAX DX C-: CPT

## 2018-06-19 PROCEDURE — 85730 THROMBOPLASTIN TIME PARTIAL: CPT | Performed by: EMERGENCY MEDICINE

## 2018-06-19 PROCEDURE — 93325 DOPPLER ECHO COLOR FLOW MAPG: CPT | Performed by: INTERNAL MEDICINE

## 2018-06-19 PROCEDURE — 96365 THER/PROPH/DIAG IV INF INIT: CPT

## 2018-06-19 PROCEDURE — 81003 URINALYSIS AUTO W/O SCOPE: CPT

## 2018-06-19 RX ORDER — ONDANSETRON 2 MG/ML
4 INJECTION INTRAMUSCULAR; INTRAVENOUS EVERY 4 HOURS PRN
Status: DISCONTINUED | OUTPATIENT
Start: 2018-06-19 | End: 2018-06-22 | Stop reason: HOSPADM

## 2018-06-19 RX ORDER — ALBUTEROL SULFATE 2.5 MG/3ML
5 SOLUTION RESPIRATORY (INHALATION) ONCE
Status: COMPLETED | OUTPATIENT
Start: 2018-06-19 | End: 2018-06-19

## 2018-06-19 RX ORDER — GINSENG 100 MG
1 CAPSULE ORAL 2 TIMES DAILY
Status: DISCONTINUED | OUTPATIENT
Start: 2018-06-19 | End: 2018-06-22 | Stop reason: HOSPADM

## 2018-06-19 RX ORDER — ONDANSETRON 2 MG/ML
INJECTION INTRAMUSCULAR; INTRAVENOUS
Status: COMPLETED
Start: 2018-06-19 | End: 2018-06-19

## 2018-06-19 RX ORDER — ASPIRIN 81 MG/1
81 TABLET ORAL DAILY
Status: DISCONTINUED | OUTPATIENT
Start: 2018-06-20 | End: 2018-06-22 | Stop reason: HOSPADM

## 2018-06-19 RX ORDER — SODIUM CHLORIDE, SODIUM GLUCONATE, SODIUM ACETATE, POTASSIUM CHLORIDE, MAGNESIUM CHLORIDE, SODIUM PHOSPHATE, DIBASIC, AND POTASSIUM PHOSPHATE .53; .5; .37; .037; .03; .012; .00082 G/100ML; G/100ML; G/100ML; G/100ML; G/100ML; G/100ML; G/100ML
1000 INJECTION, SOLUTION INTRAVENOUS ONCE
Status: COMPLETED | OUTPATIENT
Start: 2018-06-19 | End: 2018-06-19

## 2018-06-19 RX ORDER — DEXTROSE MONOHYDRATE 25 G/50ML
INJECTION, SOLUTION INTRAVENOUS
Status: COMPLETED
Start: 2018-06-19 | End: 2018-06-19

## 2018-06-19 RX ORDER — BUDESONIDE 0.5 MG/2ML
0.5 INHALANT ORAL
Status: DISCONTINUED | OUTPATIENT
Start: 2018-06-19 | End: 2018-06-20

## 2018-06-19 RX ORDER — ONDANSETRON 2 MG/ML
4 INJECTION INTRAMUSCULAR; INTRAVENOUS ONCE
Status: COMPLETED | OUTPATIENT
Start: 2018-06-19 | End: 2018-06-19

## 2018-06-19 RX ORDER — AMOXICILLIN 250 MG
2 CAPSULE ORAL 2 TIMES DAILY
Status: DISCONTINUED | OUTPATIENT
Start: 2018-06-19 | End: 2018-06-22 | Stop reason: HOSPADM

## 2018-06-19 RX ORDER — FLUTICASONE PROPIONATE 50 MCG
1 SPRAY, SUSPENSION (ML) NASAL DAILY
Status: DISCONTINUED | OUTPATIENT
Start: 2018-06-19 | End: 2018-06-22 | Stop reason: HOSPADM

## 2018-06-19 RX ORDER — DEXTROSE MONOHYDRATE 25 G/50ML
25 INJECTION, SOLUTION INTRAVENOUS ONCE
Status: COMPLETED | OUTPATIENT
Start: 2018-06-19 | End: 2018-06-19

## 2018-06-19 RX ORDER — CHLORHEXIDINE GLUCONATE 0.12 MG/ML
15 RINSE ORAL EVERY 12 HOURS SCHEDULED
Status: DISCONTINUED | OUTPATIENT
Start: 2018-06-19 | End: 2018-06-22 | Stop reason: HOSPADM

## 2018-06-19 RX ORDER — HEPARIN SODIUM 5000 [USP'U]/ML
7500 INJECTION, SOLUTION INTRAVENOUS; SUBCUTANEOUS EVERY 8 HOURS SCHEDULED
Status: DISCONTINUED | OUTPATIENT
Start: 2018-06-19 | End: 2018-06-22 | Stop reason: HOSPADM

## 2018-06-19 RX ORDER — PRAVASTATIN SODIUM 80 MG/1
80 TABLET ORAL
Status: DISCONTINUED | OUTPATIENT
Start: 2018-06-19 | End: 2018-06-22 | Stop reason: HOSPADM

## 2018-06-19 RX ORDER — FERROUS SULFATE 325(65) MG
325 TABLET ORAL
Status: DISCONTINUED | OUTPATIENT
Start: 2018-06-19 | End: 2018-06-22 | Stop reason: HOSPADM

## 2018-06-19 RX ORDER — SODIUM CHLORIDE, SODIUM GLUCONATE, SODIUM ACETATE, POTASSIUM CHLORIDE, MAGNESIUM CHLORIDE, SODIUM PHOSPHATE, DIBASIC, AND POTASSIUM PHOSPHATE .53; .5; .37; .037; .03; .012; .00082 G/100ML; G/100ML; G/100ML; G/100ML; G/100ML; G/100ML; G/100ML
2400 INJECTION, SOLUTION INTRAVENOUS ONCE
Status: COMPLETED | OUTPATIENT
Start: 2018-06-19 | End: 2018-06-19

## 2018-06-19 RX ORDER — LEVALBUTEROL 1.25 MG/.5ML
1.25 SOLUTION, CONCENTRATE RESPIRATORY (INHALATION) EVERY 6 HOURS
Status: DISCONTINUED | OUTPATIENT
Start: 2018-06-19 | End: 2018-06-21

## 2018-06-19 RX ORDER — PANTOPRAZOLE SODIUM 40 MG/1
40 TABLET, DELAYED RELEASE ORAL
Status: DISCONTINUED | OUTPATIENT
Start: 2018-06-19 | End: 2018-06-22 | Stop reason: HOSPADM

## 2018-06-19 RX ADMIN — IPRATROPIUM BROMIDE 0.5 MG: 0.5 SOLUTION RESPIRATORY (INHALATION) at 05:23

## 2018-06-19 RX ADMIN — LEVALBUTEROL HYDROCHLORIDE 1.25 MG: 1.25 SOLUTION, CONCENTRATE RESPIRATORY (INHALATION) at 13:08

## 2018-06-19 RX ADMIN — DEXTROSE MONOHYDRATE 50 ML: 25 INJECTION, SOLUTION INTRAVENOUS at 04:58

## 2018-06-19 RX ADMIN — Medication 2 TABLET: at 18:02

## 2018-06-19 RX ADMIN — BACITRACIN ZINC 1 SMALL APPLICATION: 500 OINTMENT TOPICAL at 18:13

## 2018-06-19 RX ADMIN — BUDESONIDE 0.5 MG: 0.5 INHALANT RESPIRATORY (INHALATION) at 11:03

## 2018-06-19 RX ADMIN — HEPARIN SODIUM 7500 UNITS: 5000 INJECTION, SOLUTION INTRAVENOUS; SUBCUTANEOUS at 21:48

## 2018-06-19 RX ADMIN — NOREPINEPHRINE BITARTRATE 8 MCG/MIN: 1 INJECTION INTRAVENOUS at 12:25

## 2018-06-19 RX ADMIN — BUDESONIDE 0.5 MG: 0.5 INHALANT RESPIRATORY (INHALATION) at 19:54

## 2018-06-19 RX ADMIN — PRAVASTATIN SODIUM 80 MG: 80 TABLET ORAL at 18:12

## 2018-06-19 RX ADMIN — INSULIN LISPRO 2 UNITS: 100 INJECTION, SOLUTION INTRAVENOUS; SUBCUTANEOUS at 11:56

## 2018-06-19 RX ADMIN — CHLORHEXIDINE GLUCONATE 15 ML: 1.2 RINSE ORAL at 20:11

## 2018-06-19 RX ADMIN — BACITRACIN ZINC 1 SMALL APPLICATION: 500 OINTMENT TOPICAL at 13:20

## 2018-06-19 RX ADMIN — HEPARIN SODIUM 7500 UNITS: 5000 INJECTION, SOLUTION INTRAVENOUS; SUBCUTANEOUS at 13:20

## 2018-06-19 RX ADMIN — SODIUM CHLORIDE, SODIUM GLUCONATE, SODIUM ACETATE, POTASSIUM CHLORIDE, MAGNESIUM CHLORIDE, SODIUM PHOSPHATE, DIBASIC, AND POTASSIUM PHOSPHATE 1000 ML: .53; .5; .37; .037; .03; .012; .00082 INJECTION, SOLUTION INTRAVENOUS at 17:07

## 2018-06-19 RX ADMIN — PANTOPRAZOLE SODIUM 40 MG: 40 TABLET, DELAYED RELEASE ORAL at 18:03

## 2018-06-19 RX ADMIN — CHLORHEXIDINE GLUCONATE 15 ML: 1.2 RINSE ORAL at 10:39

## 2018-06-19 RX ADMIN — NOREPINEPHRINE BITARTRATE 10 MCG/MIN: 1 INJECTION INTRAVENOUS at 05:48

## 2018-06-19 RX ADMIN — LEVALBUTEROL HYDROCHLORIDE 1.25 MG: 1.25 SOLUTION, CONCENTRATE RESPIRATORY (INHALATION) at 19:54

## 2018-06-19 RX ADMIN — INSULIN LISPRO 6 UNITS: 100 INJECTION, SOLUTION INTRAVENOUS; SUBCUTANEOUS at 23:58

## 2018-06-19 RX ADMIN — PANTOPRAZOLE SODIUM 40 MG: 40 TABLET, DELAYED RELEASE ORAL at 10:40

## 2018-06-19 RX ADMIN — SODIUM CHLORIDE 500 ML: 0.9 INJECTION, SOLUTION INTRAVENOUS at 05:01

## 2018-06-19 RX ADMIN — IPRATROPIUM BROMIDE 0.5 MG: 0.5 SOLUTION RESPIRATORY (INHALATION) at 13:09

## 2018-06-19 RX ADMIN — NOREPINEPHRINE BITARTRATE 6 MCG/MIN: 1 INJECTION INTRAVENOUS at 21:49

## 2018-06-19 RX ADMIN — ONDANSETRON 4 MG: 2 INJECTION, SOLUTION INTRAMUSCULAR; INTRAVENOUS at 06:00

## 2018-06-19 RX ADMIN — SODIUM CHLORIDE 1000 ML: 0.9 INJECTION, SOLUTION INTRAVENOUS at 05:29

## 2018-06-19 RX ADMIN — SODIUM CHLORIDE 1000 ML: 0.9 INJECTION, SOLUTION INTRAVENOUS at 07:49

## 2018-06-19 RX ADMIN — DEXTROSE MONOHYDRATE 25 ML: 25 INJECTION, SOLUTION INTRAVENOUS at 09:57

## 2018-06-19 RX ADMIN — SODIUM CHLORIDE, SODIUM GLUCONATE, SODIUM ACETATE, POTASSIUM CHLORIDE, MAGNESIUM CHLORIDE, SODIUM PHOSPHATE, DIBASIC, AND POTASSIUM PHOSPHATE 2400 ML: .53; .5; .37; .037; .03; .012; .00082 INJECTION, SOLUTION INTRAVENOUS at 10:00

## 2018-06-19 RX ADMIN — ONDANSETRON 4 MG: 2 INJECTION INTRAMUSCULAR; INTRAVENOUS at 06:00

## 2018-06-19 RX ADMIN — IPRATROPIUM BROMIDE 0.5 MG: 0.5 SOLUTION RESPIRATORY (INHALATION) at 19:54

## 2018-06-19 RX ADMIN — ALBUTEROL SULFATE 5 MG: 2.5 SOLUTION RESPIRATORY (INHALATION) at 05:23

## 2018-06-19 RX ADMIN — Medication 2 TABLET: at 10:40

## 2018-06-19 RX ADMIN — FLUTICASONE PROPIONATE 1 SPRAY: 50 SPRAY, METERED NASAL at 11:45

## 2018-06-19 NOTE — H&P
H&P Exam - Melina Watt Sr  81 y o  male MRN: [id number]  Unit/Bed#: MICU 10 Encounter: [id number]      Chief Complaint: "I stood up and just felt lightheaded and went down"    History of Present Illness     Esther Bridges Sr  is a 81 y o  male with PMH significant for COPD, HTN, HLD, IDDM type 2, CHF, and CAD who presented to the ED today after a fall at home  The patient states that he was in his usual state of health up until this morning  He got up overnight to use the bathroom and suddenly felt lightheaded and fell  He denies LOC or hitting his head  Patient lives with his son who called EMS  He was noted to have BGL of 50 and was given oral glucose followed by 25g IV dextrose  Patient was brought to the ED where his initial CT head, cspine, CT C/A/P were all negative for traumatic injury, however, he was noted to be lethargic and acutely hypotensive with BP 62/40 and had a lactic acidosis  He was started on levophed and given IV fluids with appropriate response  He was able to be weaned off of levophed rapidly, however, just before being admitted to the floor, he again became acutely hypotensive and again required levophed  Patient was also noted to be acidotic and hypercapnic on VBG, however, the patient adamantly refused BiPAP  His mental status remained appropriate, and he did not require emergent intubation       History obtained from chart review and the patient   -------------------------------------------------------------------------------------------------------------  Assessment and Plan   Principal Problem:    Hypovolemic shock ([address] )  Active Problems:    Respiratory acidosis    MARANDA (acute kidney injury) ([facility] )    Lactic acidosis    COPD (chronic obstructive pulmonary disease) (HCC)    Chronic diastolic heart failure (HCC)    Acute metabolic encephalopathy    Chronic kidney disease, stage 3    Type 2 diabetes mellitus with hyperglycemia, with long-term current use of insulin [facility])    CAD (coronary artery disease)    Leukocytosis    RBBB    Obesity (BMI 30-39  9)    Dyslipidemia      Neuro:   · Acute metabolic encephalopathy- patient alert and oriented at this time  Continue routine neuro checks  Avoid sedating medications  Delirium precautions  Sleep/wake cycle regulation  CAM-ICU daily      CV:   · Shock- Suspected due to hypovolemia  Patient describes fall that appears orthostatic in nature after getting up from lying position  Patient given 2 5L IVF in ED    Will give an additional 1400ml to complete 30ml/kg  Patient started on levophed  Continue to maintain MAP >65  Patient refusing TLC, will obtain PICC  CAD and RBBB with dyslipidemia- continue home ASA and statin  Continue close telemetry monitoring   · Hx diastolic CHF and hypertension- holding home metoprolol 25mg BID, lasix 40mg BID, and lisinopril 10mg daily due to hypotension and hypovolemia  Patient states that he had previously been off his Lasix, however, when he was discharged on 5/26/18 for acute CHF exacerbation, his Lasix was resumed at a double dose  Suspect contribution to shock      Pulm:  · COPD- does not appear to be in acute exacerbation, although marked hypercapnia noted on VBG, obtain ABG  Patient refusing BiPAP secondary to to claustrophobia, but will discuss with patient and attempt to convince him to use mask to avoid intubation  Will hold home Symbicort and Spiriva and start xopenx/ipratropium q6h and budesonide q12h  Maintain SpO2 >88%  Continue pulmonary hygiene  Incentive spirometer q1h while awake, encourage coughing and deep breathing  GI:   · GERD- continue home protonix PO  · Will start bowel regimen       :   · MARANDA superimposed on CKD 3- Baseline creatinine 1 24 - 1 47  Suspect secondary to hypovolemia  Continue fluid resuscitation and monitor  Place patient on urinary retention protocol with bladder scan and straight cath as needed  Strict q4h I/O monitoring   Continue to follow renal function tests  F/E/N:   · Lactic acidosis- continue fluid resuscitation and continue to trend until clear  · Repeat BMP and treat electrolytes as needed  · NPO with sips for meds only at this time due to high vasopressor requirements     Heme/Onc:   · Anemia- hemoglobin at baseline on admission  Continue daily iron supplementation  Continue to trend hemoglobin and platelets  · PPx- SQH 7500 units TID, SCD's to BLE    Endo:   · Insulin dependent type 2 diabetes- patient hypoglycemic on admission  Holding patient's home regimen and placing on q6h SSI and accuchecks  Dextrose as needed  Goal -180  Last A1c 7 9% in May  ID:   · Leukocytosis- patient with temp 94 7 on admission, however, no clear source of sepsis  Procalcitonin <0 5  Patient's history not consistent with new infection  UA negative  BCXx2 pending  Hold off on antibiotics at this time and continue to monitor fever and WBC curve  MSK/Skin:   · Reposition q2h, eliminate pressures points  · Close skin surveillance   · PT/OT when appropriate   · Bacitracin to abrasions      Disposition: critical care     Code Status: Level 2 - DNAR: but accepts endotracheal intubation  --------------------------------------------------------------------------------------------------------------    Historical Information   Past Medical History:   Diagnosis Date    Abdominal pain     Cardiac disease     [facility]congest[facility] failure) ([facility] )     COPD (chronic obstructive pulmonary disease) ([place] [address] )     Coronary artery disease     Diabetes mellitus ([place] [address] )     Hyperlipidemia     Hypertension     MI (myocardial infarction) ([place] [address] )     with 3 stents    Prostate cancer ([place] [address] )      Past Surgical History:   Procedure Laterality Date    ABDOMINAL SURGERY      exploratory    ANGIOPLASTY      3 stents    ESOPHAGOGASTRODUODENOSCOPY N/A 10/2/2017    Procedure: ESOPHAGOGASTRODUODENOSCOPY (EGD); Surgeon: Jan Cowan MD;  Location: [facility];   Service: Gastroenterology    PROSTATE SURGERY       Social History   History   Alcohol Use No     History   Drug Use No     History   Smoking Status    Former Smoker    Packs/day: 1 50    Years: 50 00    Quit date: 9/13/2017   Smokeless Tobacco    Never Used     Exercise History: independent ADL  Family History:   Family History   Problem Relation Age of Onset    Heart disease Father     Other Father         Mesothelioma        Vitals:   Vitals:    06/19/18 0803 06/19/18 0813 06/19/18 0833 06/19/18 0843   BP: 113/61 96/52 125/61 145/53   BP Location:       Pulse: 94 84 82 96   Resp: (!) 26 (!) 24  19   Temp:    97 7 °F (36 5 °C)   TempSrc:    Oral   SpO2: 95% 95% 97% 95%   Weight:   129 kg (285 lb 4 4 oz)    Height:   6' (1 829 m)      Temp  Min: 94 5 °F (34 7 °C)  Max: 97 7 °F (36 5 °C)  IBW: 77 6 kg  Height: 6' (182 9 cm)  Body mass index is 38 69 kg/m²  Physical Exam   Constitutional: He appears well-developed  He is cooperative  No distress  Nasal cannula in place  HENT:   Head: Normocephalic and atraumatic  Mouth/Throat: Mucous membranes are dry  Eyes: EOM are normal  Pupils are equal, round, and reactive to light  Neck: Neck supple  No JVD present  Cardiovascular: Normal rate, regular rhythm and normal heart sounds  No extrasystoles are present  Pulses:       Radial pulses are 2+ on the right side, and 2+ on the left side  Dorsalis pedis pulses are 1+ on the right side, and 2+ on the left side  Non-pitting LE edema    Pulmonary/Chest: Tachypnea noted  No respiratory distress  He has decreased breath sounds in the right lower field and the left lower field  He has no wheezes  He has no rhonchi  He has no rales  Abdominal: Soft  He exhibits no distension  There is no tenderness  Obese abdomen   Genitourinary:   Genitourinary Comments: Normal external inspection   Feet:   Right Foot:   Skin Integrity: Positive for callus and dry skin     Left Foot:   Skin Integrity: Positive for callus and dry skin  Neurological: He is alert  He has normal strength  He is not disoriented  He displays tremor  GCS eye subscore is 4  GCS verbal subscore is 5  GCS motor subscore is 6  Skin: Skin is warm and dry  Capillary refill takes less than 2 seconds  He is not diaphoretic  Bruising to RLE and abdomen           Medications:  Current Facility-Administered Medications   Medication Dose Route Frequency    budesonide (PULMICORT) inhalation solution 0 5 mg  0 5 mg Nebulization Q12H    chlorhexidine (PERIDEX) 0 12 % oral rinse 15 mL  15 mL Swish & Spit Q12H [address]    ferrous sulfate tablet 325 mg  325 mg Oral Daily With Breakfast    fluticasone (FLONASE) 50 mcg/act nasal spray 1 spray  1 spray Nasal Daily    heparin (porcine) subcutaneous injection 7,500 Units  7,500 Units Subcutaneous Q8H [address]    ipratropium (ATROVENT) 0 02 % inhalation solution 0 5 mg  0 5 mg Nebulization Q6H    levalbuterol (XOPENEX) inhalation solution 1 25 mg  1 25 mg Nebulization Q6H    norepinephrine (LEVOPHED) 4 mg (STANDARD CONCENTRATION) IV in sodium chloride 0 9% 250 mL  1-30 mcg/min Intravenous Titrated    pantoprazole (PROTONIX) EC tablet 40 mg  40 mg Oral BID AC    pravastatin (PRAVACHOL) tablet 80 mg  80 mg Oral Daily With Dinner    sodium chloride 0 9 % bolus 1,000 mL  1,000 mL Intravenous Once     Home medications:    Prior to Admission Medications   Prescriptions Last Dose Informant Patient Reported? Taking?    Insulin Syringe/Needle U-500 (BD INSULIN SYRINGE U-500) 31G X 6MM 0 5 ML MISC 6/18/2018 at Unknown time Self Yes Yes   Sig: by Does not apply route   Omega-3 Fatty Acids (FISH OIL) 645 MG CAPS 6/18/2018 at Unknown time Self Yes Yes   Sig: Take 1 capsule by mouth 2 (two) times a day   albuterol (2 5 mg/3 mL) 0 083 % nebulizer solution 6/18/2018 at Unknown time Self Yes Yes   Sig: Inhale 1 each   aspirin (ECOTRIN LOW STRENGTH) 81 mg EC tablet 6/18/2018 at Unknown time Self Yes Yes   Sig: Take 1 tablet by mouth daily   budesonide-formoterol (SYMBICORT) 160-4 5 mcg/act inhaler 2018 at Unknown time Self Yes Yes   Sig: Inhale 2 puffs 2 (two) times a day   ferrous sulfate 325 (65 Fe) mg tablet 2018 at Unknown time Self Yes Yes   Sig: Take 325 mg by mouth daily with breakfast   fluticasone (FLONASE) 50 mcg/act nasal spray 2018 at Unknown time Self No Yes   Si spray into each nostril daily   furosemide (LASIX) 40 mg tablet 2018 at Unknown time Self No Yes   Sig: Take 1 tablet (40 mg total) by mouth 2 (two) times a day   insulin regular (HumuLIN R U-500) 500 units/mL CONCENTRATED injection 2018 at Unknown time Self No Yes   Sig: Inject 0 09 mL (45 Units total) under the skin 2 (two) times a day before breakfast and lunch   insulin regular (HumuLIN R U-500) 500 units/mL CONCENTRATED injection 2018 at Unknown time Self No Yes   Sig: Inject 0 05 mL (25 Units total) under the skin daily before dinner   lisinopril (ZESTRIL) 10 mg tablet 2018 at Unknown time Self Yes Yes   Sig: Take 10 mg by mouth daily Patient unsure of dose of lisinopril    metoprolol tartrate (LOPRESSOR) 25 mg tablet 2018 at Unknown time Self Yes Yes   Sig: Take 1 tablet by mouth 2 (two) times a day   oxyCODONE-acetaminophen (PERCOCET) 7 5-325 MG per tablet 2018 at Unknown time Self Yes Yes   Sig: Take by mouth every 4 (four) hours   pantoprazole (PROTONIX) 40 mg tablet 2018 at Unknown time Self No Yes   Sig: Take 1 tablet by mouth 2 (two) times a day before meals   potassium chloride (K-DUR,KLOR-CON) 20 mEq tablet 2018 at Unknown time Self No Yes   Sig: Take 1 tablet (20 mEq total) by mouth 2 (two) times a day   simvastatin (ZOCOR) 40 mg tablet 2018 at Unknown time Self Yes Yes   Sig: Take 40 mg by mouth daily   tamsulosin (FLOMAX) 0 4 mg 2018 at Unknown time Self No Yes   Sig: Take 1 capsule (0 4 mg total) by mouth daily with dinner   tiotropium (SPIRIVA) 18 mcg inhalation capsule 2018 at Unknown time Self Yes Yes   Sig: Place 18 mcg into inhaler and inhale daily      Facility-Administered Medications: None     Allergies: Allergies   Allergen Reactions    Metformin GI Intolerance       Review of Systems   Constitutional: Negative for activity change, appetite change, fatigue and fever  HENT: Negative  Negative for sore throat  Eyes: Negative  Negative for photophobia  Respiratory: Negative  Negative for chest tightness and shortness of breath  Cardiovascular: Negative  Negative for chest pain, palpitations and leg swelling  Gastrointestinal: Negative  Negative for abdominal pain, diarrhea, nausea and vomiting  Genitourinary: Positive for enuresis  Negative for difficulty urinating and dysuria  Musculoskeletal: Negative  Skin: Negative  Neurological: Positive for dizziness, tremors and light-headedness  Negative for seizures, weakness and headaches  Psychiatric/Behavioral: Negative          Laboratory and Diagnostics:    Results from last 7 days  Lab Units 06/19/18  0618 06/19/18  0558 06/19/18  0505   WBC Thousand/uL 15 28*  --  19 60*   HEMOGLOBIN g/dL 10 6*  --  12 3   I STAT HEMOGLOBIN g/dl  --  9 5*  --    HEMATOCRIT % 35 1*  --  39 8   PLATELETS Thousands/uL 136*  --  191   NEUTROS PCT % 89*  --  87*   MONOS PCT % 6  --  6       Results from last 7 days  Lab Units 06/19/18  0726 06/19/18  0558 06/19/18  0505   SODIUM mmol/L 138  --  139   POTASSIUM mmol/L 5 4*  --  4 5   CHLORIDE mmol/L 103  --  98*   CO2 mmol/L 33*  --  32   BUN mg/dL 57*  --  59*   CREATININE mg/dL 2 53*  --  2 64*   CALCIUM mg/dL 7 6*  --  8 5   ANION GAP mmol/L 2*  --  9   ALBUMIN g/dL  --   --  3 0*   BILIRUBIN TOTAL mg/dL  --   --  0 37   ALK PHOS U/L  --   --  92   ALT U/L  --   --  37   AST U/L  --   --  29   GLUCOSE RANDOM mg/dL 141*  --  40*   GLUCOSE, ISTAT mg/dl  --  107  --             Results from last 7 days  Lab Units 06/19/18  0617   INR  1 09   PTT seconds 30       Results from last 7 days  Lab Units 06/19/18  0750 06/19/18  0445   TROPONIN I ng/mL <0 02 <0 02       Results from last 7 days  Lab Units 06/19/18  0712 06/19/18  0529 06/19/18  0445   LACTIC ACID mmol/L 2 1* 2 6* 5 7*     VBG:  Results from last 7 days  Lab Units 06/19/18  0750   PH MAYE  7 199*   PCO2 MAYE mm Hg 80 2*   PO2 MAYE mm Hg 52 1*   HCO3 MAYE mmol/L 30 6*   BASE EXC MAYE mmol/L 0 7       Micro: BCXx2: PENDING     EKG: NSR with RBBB  Imaging: I have personally reviewed pertinent reports  and I have personally reviewed pertinent films in PACS  6/19 CT C/A/P: No acute findings in the chest, abdomen or pelvis  Markedly urinary bladder distention  Constipation  6/19 CT head: Mild cerebral atrophy with chronic small vessel ischemic change  No acute intracranial abnormality  6/19 CT c-spine: No acute cervical spine fracture or traumatic malalignment  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  6/19 CXR: Small right pleural effusion  Otherwise unremarkable   ------------------------------------------------------------------------------------------------------------    Anticipated Length of Stay is > 2 midnights    Counseling / Coordination of Care  Total Critical Care time spent 45 minutes excluding procedures, teaching and family updates  [address]   [place]

## 2018-06-19 NOTE — RESPIRATORY THERAPY NOTE
RT Protocol Note  Ariana Bledsoe Sr  81 y o  male MRN: [id number]  Unit/Bed#: MICU 10 Encounter: [id number]    Assessment    Principal Problem:    Hypovolemic shock ([place] [address] )  Active Problems:    Obesity (BMI 30-39  9)    Dyslipidemia    Type 2 diabetes mellitus with hyperglycemia, with long-term current use of insulin ([facility])    Leukocytosis    COPD (chronic obstructive pulmonary disease) ([facility])    Chronic kidney disease, stage 3    Chronic diastolic heart failure ([facility])    CAD (coronary artery disease)    Acute metabolic encephalopathy    Lactic acidosis    MARANDA (acute kidney injury) ([place] [address] )    RBBB    Respiratory acidosis      Home Pulmonary Medications:  Albuterol, spiriva, symbicort       Past Medical History:   Diagnosis Date    Abdominal pain     Cardiac disease     CHF (congestive heart failure) ([facility])     COPD (chronic obstructive pulmonary disease) ([place] [address] )     Coronary artery disease     Diabetes mellitus ([place] [address] )     Hyperlipidemia     Hypertension     MI (myocardial infarction) ([place] [address] )     with 3 stents    Prostate cancer ([place] [address] )      Social History     Social History    Marital status: /Civil Union     Spouse name: N/A    Number of children: N/A    Years of education: N/A     Social History Main Topics    Smoking status: Former Smoker     Packs/day: 1 50     Years: 50 00     Quit date: 9/13/2017    Smokeless tobacco: Never Used    Alcohol use No    Drug use: No    Sexual activity: No     Other Topics Concern    None     Social History Narrative    None       Subjective         Objective    Physical Exam:        Vitals:  Blood pressure 133/55, pulse 82, temperature 97 7 °F (36 5 °C), temperature source Oral, resp  rate 17, height 6' (1 829 m), weight 129 kg (285 lb 4 4 oz), SpO2 99 %        Results from last 7 days  Lab Units 06/19/18  1107   PH ART  7 290*   PCO2 ART mm Hg 64 8*   PO2 ART mm Hg 79 1   HCO3 ART mmol/L 30 5*   BASE EXC ART mmol/L 2 6   O2 CONTENT ART mL/dL 14 7*   O2 HGB, ARTERIAL % 92 9*   ABG SOURCE  Radial, Left   KRISTIAN TEST  Yes       Imaging and other studies: I have personally reviewed pertinent reports  Plan    Respiratory Plan: Home Bronchodilator Patient pathway        Resp Comments: (P) Pt admit  to ED after passing out at home  Pt has copd hx  Uses albuterol, spiriva and symbicort at home  Will cont udn per resp protocol

## 2018-06-19 NOTE — PROCEDURES
Insert PICC line  Date/Time: 6/19/2018 10:29 AM  Performed by: Shaheen Morales by: Reina Beckett     Patient location:  Bedside  Other Assisting Provider: Yes (comment) (Janelle YI )    Consent:     Consent obtained:  Written (Deyaniraanuj Miranda obtained consent )    Consent given by:  Patient (verbally pt was unable to sign )  Universal protocol:     Procedure explained and questions answered to patient or proxy's satisfaction: yes      Relevant documents present and verified: yes      Test results available and properly labeled: yes      Radiology Images displayed and confirmed  If images not available, report reviewed: yes      Required blood products, implants, devices, and special equipment available: yes      Site/side marked: yes      Immediately prior to procedure, a time out was called: yes      Patient identity confirmed:  Verbally with patient and arm band  Pre-procedure details:     Hand hygiene: Hand hygiene performed prior to insertion      Sterile barrier technique: All elements of maximal sterile technique followed      Skin preparation:  ChloraPrep    Skin preparation agent: Skin preparation agent completely dried prior to procedure    Indications:     PICC line indications: medications requiring central line    Anesthesia (see MAR for exact dosages):      Anesthesia method:  Local infiltration    Local anesthetic:  Lidocaine 1% w/o epi (2 ml)  Procedure details:     Location:  Basilic    Vessel type: vein      Laterality:  Right    Approach: percutaneous technique used      Patient position:  Flat    Procedural supplies:  Triple lumen    Catheter size:  5 Fr    Landmarks identified: yes      Ultrasound guidance: yes      Sterile ultrasound techniques: Sterile gel and sterile probe covers were used      Number of attempts:  1    Successful placement: yes      Vessel of catheter tip end:  Sherlock 3CG confirmed    Total catheter length (cm):  46    Catheter out on skin (cm):  1 Max flow rate:  99ml/hr     Arm circumference:  32  Post-procedure details:     Post-procedure:  Line sutured and securement device placed    Assessment:  Blood return through all ports    Post-procedure complications: none      Patient tolerance of procedure:   Tolerated well, no immediate complications

## 2018-06-19 NOTE — SOCIAL WORK
Pt is a <30 days readmission  Pt was last admitted 5/23-5/26 d/t acute on chronic CHF  Pt was admitted d/t hypovolemic shock at this time  Pt is not a bundle  CM met pt at bedside and made aware of CM role at d/c  Pt denies having a LW or POA  Pt reported that primary contact is his son [id number] alternate  Pt reported that his wife is at [place] on hospice  Pt verbalize interest with healthcare POA, CM paged pastoral care to inform  Pt reported that he lives with his son Caity Pardo works full time) in a 2 story house with 1 VINI  Pt can have a 1st floor set up  Pt was IPTA with all ADL's, drive and retired  Pt uses a cane for ambulation and has home O2 @3lpm from [employer]  Pt's other DME's available are a walker, bedside commode and shower chair  Pt is current with [facility] nsg and requested continuation of care, referral in [facility]  Pt has hx with [facility] for STR  Pt denies hx with alc, drug and psych tx  PCP is Dr Banegas Line with [facility] was identified as [facility] per criteria and called in to [facility]  CM informed [facility] Leblanc that pt is a non [facility]  CM noted also that pt was not able to be enrolled last admission for OP CC d/t non [facility]  Ph[facility]  Pt has transportation when dc  CM reviewed d/c planning process including the following: identifying help at home, patient preference for d/c planning needs, Discharge Lounge, [facility] Meds to Bed program, availability of treatment team to discuss questions or concerns patient and/or family may have regarding understanding medications and recognizing signs and symptoms once discharged  CM also encouraged patient to follow up with all recommended appointments after discharge  Patient advised of importance for patient and family to participate in managing patients medical well being

## 2018-06-19 NOTE — PLAN OF CARE
Problem: Potential for Falls  Goal: Patient will remain free of falls  INTERVENTIONS:  - Assess patient frequently for physical needs  -  Identify cognitive and physical deficits and behaviors that affect risk of falls    -  Hopkins fall precautions as indicated by assessment   - Educate patient/family on patient safety including physical limitations  - Instruct patient to call for assistance with activity based on assessment  - Modify environment to reduce risk of injury  - Consider OT/PT consult to assist with strengthening/mobility   Outcome: Not Progressing      Problem: Prexisting or High Potential for Compromised Skin Integrity  Goal: Skin integrity is maintained or improved  INTERVENTIONS:  - Identify patients at risk for skin breakdown  - Assess and monitor skin integrity  - Assess and monitor nutrition and hydration status  - Monitor labs (i e  albumin)  - Assess for incontinence   - Turn and reposition patient  - Assist with mobility/ambulation  - Relieve pressure over bony prominences  - Avoid friction and shearing  - Provide appropriate hygiene as needed including keeping skin clean and dry  - Evaluate need for skin moisturizer/barrier cream  - Collaborate with interdisciplinary team (i e  Nutrition, Rehabilitation, etc )   - Patient/family teaching   Outcome: Not Progressing

## 2018-06-19 NOTE — ED PROVIDER NOTES
History  Chief Complaint   Patient presents with    Multiple Falls     Per EMS report, "Pt  is coming from home where he had a fall tonight after reporting dizziness  BS-55, given 15grams of PO glucogan pre-hospital   Pt  reports to taking insulin prior to EMS arrival  Pt  denies hitting head  Denies LOC  Reports neck pain "  C-collar applied pre-hospital       79-year-old male history of COPD, diabetes, CHF, morbid obesity presents for evaluation of fall  Patient states that he woke up because he felt he had to go pee and when he got up he felt lightheaded causing him to fall to the floor  He denies hitting his head and remembers the entire episode  He called EMS and on their arrival complaint of neck pain  He was placed in a collar and initial Accu-Chek was less than 50  He was given oral glucose and transferred to the emergency department  In the ED the patient states that he is feeling sleepy and lightheaded but otherwise has no complaints  His recheck blood sugar was not undetectable on our Accu-Chek monitors and he was given 25 g of IV dextrose with improvement in mental status  Patient states that he takes 25 units of insulin before dinner and 45 units of regular insulin before breakfast and lunch  He took his normal amount of insulin but did not eat as much as normal yesterday secondary to decreased appetite  He denied any nausea, vomiting, abdominal pain, diarrhea or constipation  He has not had hypoglycemic episodes secondary to his insulin use in the past   He still complains of midline neck pain and his cervical spine is immobilized in a cervical collar  He also has an abrasion over his left forearm that is currently not bleeding  Otherwise no complaints  He states that he was asymptomatic prior to going to bed tonight  States that he is up-to-date on his vaccinations  Patient is not on any blood thinners but does take aspirin daily              Prior to Admission Medications Prescriptions Last Dose Informant Patient Reported? Taking?    Insulin Syringe/Needle U-500 (BD INSULIN SYRINGE U-500) 31G X 6MM 0 5 ML MISC 2018 at Unknown time Self Yes Yes   Sig: by Does not apply route   Omega-3 Fatty Acids (FISH OIL) 645 MG CAPS 2018 at Unknown time Self Yes Yes   Sig: Take 1 capsule by mouth 2 (two) times a day   albuterol (2 5 mg/3 mL) 0 083 % nebulizer solution 2018 at Unknown time Self Yes Yes   Sig: Inhale 1 each   aspirin (ECOTRIN LOW STRENGTH) 81 mg EC tablet 2018 at Unknown time Self Yes Yes   Sig: Take 1 tablet by mouth daily   budesonide-formoterol (SYMBICORT) 160-4 5 mcg/act inhaler 2018 at Unknown time Self Yes Yes   Sig: Inhale 2 puffs 2 (two) times a day   ferrous sulfate 325 (65 Fe) mg tablet 2018 at Unknown time Self Yes Yes   Sig: Take 325 mg by mouth daily with breakfast   fluticasone (FLONASE) 50 mcg/act nasal spray 2018 at Unknown time Self No Yes   Si spray into each nostril daily   furosemide (LASIX) 40 mg tablet 2018 at Unknown time Self No Yes   Sig: Take 1 tablet (40 mg total) by mouth 2 (two) times a day   insulin regular (HumuLIN R U-500) 500 units/mL CONCENTRATED injection 2018 at Unknown time Self No Yes   Sig: Inject 0 09 mL (45 Units total) under the skin 2 (two) times a day before breakfast and lunch   insulin regular (HumuLIN R U-500) 500 units/mL CONCENTRATED injection 2018 at Unknown time Self No Yes   Sig: Inject 0 05 mL (25 Units total) under the skin daily before dinner   lisinopril (ZESTRIL) 10 mg tablet 2018 at Unknown time Self Yes Yes   Sig: Take 10 mg by mouth daily Patient unsure of dose of lisinopril    metoprolol tartrate (LOPRESSOR) 25 mg tablet 2018 at Unknown time Self Yes Yes   Sig: Take 1 tablet by mouth 2 (two) times a day   oxyCODONE-acetaminophen (PERCOCET) 7 5-325 MG per tablet 2018 at Unknown time Self Yes Yes   Sig: Take by mouth every 4 (four) hours   pantoprazole (PROTONIX) 40 mg tablet 6/18/2018 at Unknown time Self No Yes   Sig: Take 1 tablet by mouth 2 (two) times a day before meals   potassium chloride (K-DUR,KLOR-CON) 20 mEq tablet 6/18/2018 at Unknown time Self No Yes   Sig: Take 1 tablet (20 mEq total) by mouth 2 (two) times a day   simvastatin (ZOCOR) 40 mg tablet 6/18/2018 at Unknown time Self Yes Yes   Sig: Take 40 mg by mouth daily   tamsulosin (FLOMAX) 0 4 mg 6/18/2018 at Unknown time Self No Yes   Sig: Take 1 capsule (0 4 mg total) by mouth daily with dinner   tiotropium (SPIRIVA) 18 mcg inhalation capsule 6/20/2018 at Unknown time Self Yes Yes   Sig: Place 18 mcg into inhaler and inhale daily      Facility-Administered Medications: None       Past Medical History:   Diagnosis Date    Abdominal pain     Cardiac disease     [facility]     COPD (chronic obstructive pulmonary disease) ([place] [address] )     Coronary artery disease     Diabetes mellitus ([place] [address] )     Hyperlipidemia     Hypertension     MI (myocardial infarction) ([place] [address] )     with 3 stents    Prostate cancer ([place] [address] )        Past Surgical History:   Procedure Laterality Date    ABDOMINAL SURGERY      exploratory    ANGIOPLASTY      3 stents    ESOPHAGOGASTRODUODENOSCOPY N/A 10/2/2017    Procedure: ESOPHAGOGASTRODUODENOSCOPY (EGD); Surgeon: Pedro Kaur MD;  Location: [facility]; Service: Gastroenterology    PROSTATE SURGERY         Family History   Problem Relation Age of Onset    Heart disease Father     Other Father         Mesothelioma      I have reviewed and agree with the history as documented  Social History   Substance Use Topics    Smoking status: Former Smoker     Packs/day: 1 50     Years: 50 00     Quit date: 9/13/2017    Smokeless tobacco: Never Used    Alcohol use No        Review of Systems   Constitutional: Negative for appetite change, chills and fever  HENT: Negative for rhinorrhea and sore throat  Eyes: Negative for photophobia and visual disturbance  Respiratory: Negative for cough and shortness of breath  Cardiovascular: Negative for chest pain and palpitations  Gastrointestinal: Negative for abdominal pain and diarrhea  Genitourinary: Negative for dysuria, frequency and urgency  Musculoskeletal: Positive for neck pain  Skin: Negative for rash  Neurological: Negative for dizziness and weakness  All other systems reviewed and are negative  Physical Exam  ED Triage Vitals   Temperature Pulse Respirations Blood Pressure SpO2   06/19/18 0505 06/19/18 0443 06/19/18 0443 06/19/18 0443 06/19/18 0443   (!) 94 5 °F (34 7 °C) (!) 107 18 134/98 98 %      Temp Source Heart Rate Source Patient Position - Orthostatic VS BP Location FiO2 (%)   06/19/18 0505 06/19/18 0520 06/19/18 0520 06/19/18 0520 --   Oral Monitor Lying Right arm       Pain Score       06/19/18 0520       9           Orthostatic Vital Signs  Vitals:    06/21/18 0740 06/21/18 1500 06/21/18 2331 06/22/18 0317   BP: 140/63 145/60 120/72 128/61   Pulse: 94 95 82 82   Patient Position - Orthostatic VS: Sitting Sitting Lying Lying       Physical Exam   Constitutional: He is oriented to person, place, and time  He appears well-developed and well-nourished  Obese, sleepy male in no acute distress, able to answer questions appropriately   HENT:   Head: Normocephalic and atraumatic  Right Ear: External ear normal    Left Ear: External ear normal    Mouth/Throat: Oropharynx is clear and moist    Dry mucous membranes   Eyes: Conjunctivae and EOM are normal  Pupils are equal, round, and reactive to light  Neck: Normal range of motion  Neck supple  No JVD present  No tracheal deviation present  Cardiovascular: Normal rate, regular rhythm and normal heart sounds  Exam reveals no gallop and no friction rub  No murmur heard  Pulmonary/Chest: Effort normal  No stridor  No respiratory distress  He has no wheezes  He has no rales  Abdominal: Soft  He exhibits no distension and no mass  There is no tenderness  There is no rebound and no guarding  Musculoskeletal: Normal range of motion  He exhibits no edema  There is an abrasion over the left anterior thigh tender to palpation without any palpable hematoma or deformity, distally extremities neurovascularly intact   Neurological: He is alert and oriented to person, place, and time  No cranial nerve deficit  Grossly nonfocal neurologic exam   Skin: Skin is dry  Capillary refill takes less than 2 seconds  No rash noted  No erythema  No pallor  Abrasion over the left forearm, ecchymosis over left forearm, no obvious lacerations, range of motion in bilateral upper extremities full and nontender, good muscle strength  Psychiatric: He has a normal mood and affect  Nursing note and vitals reviewed        ED Medications  Medications   norepinephrine (LEVOPHED) 4 mg (STANDARD CONCENTRATION) IV in sodium chloride 0 9% 250 mL (0 mcg/min Intravenous Stopped 6/20/18 1741)   chlorhexidine (PERIDEX) 0 12 % oral rinse 15 mL (15 mL Swish & Spit Given 6/21/18 2022)   heparin (porcine) subcutaneous injection 7,500 Units (7,500 Units Subcutaneous Given 6/22/18 0620)   ferrous sulfate tablet 325 mg (325 mg Oral Given 6/21/18 0806)   fluticasone (FLONASE) 50 mcg/act nasal spray 1 spray (1 spray Nasal Given 6/21/18 0805)   pantoprazole (PROTONIX) EC tablet 40 mg (40 mg Oral Given 6/22/18 0620)   pravastatin (PRAVACHOL) tablet 80 mg (80 mg Oral Given 6/21/18 1541)   ondansetron (ZOFRAN) injection 4 mg (not administered)   aspirin (ECOTRIN LOW STRENGTH) EC tablet 81 mg (81 mg Oral Given 6/21/18 0806)   bacitracin topical ointment 1 small application (1 small application Topical Not Given 6/21/18 1744)   senna-docusate sodium (SENOKOT S) 8 6-50 mg per tablet 2 tablet (2 tablets Oral Not Given 6/21/18 1744)   albuterol (PROVENTIL HFA,VENTOLIN HFA) inhaler 2 puff (not administered)   insulin lispro (HumaLOG) 100 units/mL subcutaneous injection 2-12 Units (4 Units Subcutaneous Given 6/22/18 0625)   insulin lispro (HumaLOG) 100 units/mL subcutaneous injection 2-12 Units (2 Units Subcutaneous Not Given 6/21/18 2305)   oxyCODONE-acetaminophen (PERCOCET) 5-325 mg per tablet 1 tablet (1 tablet Oral Given 6/22/18 0625)   tamsulosin (FLOMAX) capsule 0 4 mg (0 4 mg Oral Given 6/21/18 1541)   budesonide-formoterol (SYMBICORT) 160-4 5 mcg/act inhaler 2 puff (2 puffs Inhalation Given 6/21/18 1744)   tiotropium (SPIRIVA) capsule for inhaler 18 mcg (18 mcg Inhalation Given 6/21/18 0806)   insulin regular (HumuLIN R U-500) 500 units/mL CONCENTRATED injection 45 Units (45 Units Subcutaneous Given 6/22/18 0625)   insulin regular (HumuLIN R U-500) 500 units/mL CONCENTRATED injection 25 Units (25 Units Subcutaneous Given 6/21/18 1541)   metoprolol tartrate (LOPRESSOR) tablet 25 mg (25 mg Oral Given 6/21/18 2022)   furosemide (LASIX) tablet 40 mg (not administered)   levalbuterol (XOPENEX) inhalation solution 1 25 mg (1 25 mg Nebulization Given 6/22/18 0711)   sodium chloride 0 9 % inhalation solution 3 mL (3 mL Nebulization Given 6/22/18 0711)   dextrose 50 % IV solution 25 mL (50 mL Intravenous Given 6/19/18 0458)   sodium chloride 0 9 % bolus 500 mL (0 mL Intravenous Stopped 6/19/18 0528)   albuterol inhalation solution 5 mg (5 mg Nebulization Given 6/19/18 0523)   ipratropium (ATROVENT) 0 02 % inhalation solution 0 5 mg (0 5 mg Nebulization Given 6/19/18 0523)   sodium chloride 0 9 % bolus 1,000 mL (0 mL Intravenous Stopped 6/19/18 0653)   ondansetron (ZOFRAN) injection 4 mg (4 mg Intravenous Given 6/19/18 0600)   sodium chloride 0 9 % bolus 1,000 mL (0 mL Intravenous Stopped 6/19/18 0913)   multi-electrolyte (ISOLYTE-S PH 7 4) bolus 2,400 mL (0 mL Intravenous Stopped 6/19/18 1225)   dextrose 50 % IV solution 25 mL (25 mL Intravenous Given 6/19/18 2266)   multi-electrolyte (ISOLYTE-S PH 7 4) bolus 1,000 mL (1,000 mL Intravenous New Bag 6/19/18 5315)       Diagnostic Studies  Results Reviewed     Procedure Component Value Units Date/Time    Blood culture [[id number]]  (Abnormal) Collected:  06/19/18 0445    Lab Status:  Preliminary result Specimen:  Blood from Arm, Right Updated:  06/21/18 0909     Blood Culture Staphylococcus coagulase negative (A)     Gram Stain Result Gram positive cocci in clusters    Blood culture [[id number]] Collected:  06/19/18 0529    Lab Status:  Preliminary result Specimen:  Blood from Arm, Left Updated:  06/21/18 0801     Blood Culture No Growth at 48 hrs  Troponin I [[id number]]  (Normal) Collected:  06/19/18 0750    Lab Status:  Final result Specimen:  Blood from Arm, Left Updated:  06/19/18 0819     Troponin I <0 02 ng/mL     Blood gas, venous [[id number]]  (Abnormal) Collected:  06/19/18 0750    Lab Status:  Final result Specimen:  Blood from Arm, Left Updated:  06/19/18 0814     pH, Dwain 7 199 (L)     pCO2, Dwain 80 2 (H) mm Hg      pO2, Dwain 52 1 (H) mm Hg      HCO3, Dwain 30 6 (H) mmol/L      Base Excess, Dwain 0 7 mmol/L      O2 Content, Dwain 13 0 ml/dL      O2 HGB, VENOUS 79 1 %     Lactic acid, plasma [[id number]]  (Abnormal) Collected:  06/19/18 0712    Lab Status:  Final result Specimen:  Blood from Arm, Left Updated:  06/19/18 0758     LACTIC ACID 2 1 (HH) mmol/L     Narrative:         Result may be elevated if tourniquet was used during collection      BNP [[id number]]  (Abnormal) Collected:  06/19/18 0726    Lab Status:  Final result Specimen:  Blood from Arm, Left Updated:  06/19/18 0754     NT-proBNP 356 (H) pg/mL     Basic metabolic panel [[id number]]  (Abnormal) Collected:  06/19/18 0726    Lab Status:  Final result Specimen:  Blood from Arm, Left Updated:  06/19/18 0750     Sodium 138 mmol/L      Potassium 5 4 (H) mmol/L      Chloride 103 mmol/L      CO2 33 (H) mmol/L      Anion Gap 2 (L) mmol/L      BUN 57 (H) mg/dL      Creatinine 2 53 (H) mg/dL      Glucose 141 (H) mg/dL      Calcium 7 6 (L) mg/dL      eGFR 25 ml/min/1 73sq m     Narrative:         National Kidney Disease Education Program recommendations are as follows:  GFR calculation is accurate only with a steady state creatinine  Chronic Kidney disease less than 60 ml/min/1 73 sq  meters  Kidney failure less than 15 ml/min/1 73 sq  meters      Fingerstick Glucose (POCT) [[id number]]  (Normal) Collected:  06/19/18 0716    Lab Status:  Final result Updated:  06/19/18 0719     POC Glucose 121 mg/dl     POCT urinalysis dipstick [[id number]]  (Normal) Resulted:  06/19/18 0712    Lab Status:  Final result Updated:  06/19/18 0712     Color, UA complete    ED Urine Macroscopic [[id number]] Collected:  06/19/18 0711    Lab Status:  Final result Specimen:  Urine Updated:  06/19/18 0704     Color, UA Yellow     Clarity, UA Clear     pH, UA 5 5     Leukocytes, UA Negative     Nitrite, UA Negative     Protein, UA Negative mg/dl      Glucose, UA Negative mg/dl      Ketones, UA Negative mg/dl      Urobilinogen, UA 0 2 E U /dl      Bilirubin, UA Negative     Blood, UA Negative     Specific Gravity, UA 1 010    Narrative:       CLINITEK RESULT    Protime-INR [[id number]]  (Normal) Collected:  06/19/18 0617    Lab Status:  Final result Specimen:  Blood from Arm, Left Updated:  06/19/18 0654     Protime 14 2 seconds      INR 1 09    APTT [[id number]]  (Normal) Collected:  06/19/18 0617    Lab Status:  Final result Specimen:  Blood from Arm, Left Updated:  06/19/18 0654     PTT 30 seconds     Ammonia [[id number]]  (Normal) Collected:  06/19/18 0617    Lab Status:  Final result Specimen:  Blood from Arm, Left Updated:  06/19/18 0651     Ammonia 15 umol/L     CBC and differential [[id number]]  (Abnormal) Collected:  06/19/18 0618    Lab Status:  Final result Specimen:  Blood from Arm, Left Updated:  06/19/18 0632     WBC 15 28 (H) Thousand/uL      RBC 3 76 (L) Million/uL      Hemoglobin 10 6 (L) g/dL      Hematocrit 35 1 (L) %      MCV 93 fL      MCH 28 2 pg      MCHC 30 2 (L) g/dL      RDW 15 2 (H) %      MPV 10 4 fL      Platelets 152 (L) Thousands/uL nRBC 0 /100 WBCs      Neutrophils Relative 89 (H) %      Immat GRANS % 1 %      Lymphocytes Relative 4 (L) %      Monocytes Relative 6 %      Eosinophils Relative 0 %      Basophils Relative 0 %      Neutrophils Absolute 13 68 (H) Thousands/µL      Immature Grans Absolute 0 16 Thousand/uL      Lymphocytes Absolute 0 56 (L) Thousands/µL      Monocytes Absolute 0 85 Thousand/µL      Eosinophils Absolute 0 01 Thousand/µL      Basophils Absolute 0 02 Thousands/µL     Procalcitonin [[id number]]  (Abnormal) Collected:  06/19/18 0532    Lab Status:  Final result Specimen:  Blood from Arm, Left Updated:  06/19/18 0612     Procalcitonin 0 45 (H) ng/ml     Lactic acid, plasma [[id number]]  (Abnormal) Collected:  06/19/18 0529    Lab Status:  Final result Specimen:  Blood from Arm, Left Updated:  06/19/18 0604     LACTIC ACID 2 6 (HH) mmol/L     Narrative:         Result may be elevated if tourniquet was used during collection  TSH, 3rd generation with Free T4 reflex [[id number]]  (Normal) Collected:  06/19/18 0505    Lab Status:  Final result Specimen:  Blood from Arm, Right Updated:  06/19/18 0602     TSH 3RD GENERATON 1 810 uIU/mL     Narrative:         Patients undergoing fluorescein dye angiography may retain small amounts of fluorescein in the body for 48-72 hours post procedure  Samples containing fluorescein can produce falsely depressed TSH values  If the patient had this procedure,a specimen should be resubmitted post fluorescein clearance      POCT Blood Gas (CG8+) [[id number]]  (Abnormal) Collected:  06/19/18 0558    Lab Status:  Final result Updated:  06/19/18 0602     ph, Dwain ISTAT 7 257 (L)     pCO2, Dwain i-STAT 70 3 (HH) mm HG      pO2, Dwain i-STAT 165 0 (H) mm HG      BE, i-STAT 3 mmol/L      HCO3, Dwain i-STAT 31 3 (H) mmol/L      CO2, i-STAT 33 (H) mmol/L      O2 Sat, i-STAT 99 (H) %      SODIUM, I-STAT 139 mmol/l      Potassium, i-STAT 4 7 mmol/L      Calcium, Ionized i-STAT 1 08 (L) mmol/L      Hct, i-STAT 28 (L) %      Hgb, i-STAT 9 5 (L) g/dl      Glucose, i-STAT 107 mg/dl      Specimen Type VENOUS    Blood gas, venous [[id number]]  (Abnormal) Resulted:  06/19/18 0547    Lab Status:  Final result Specimen:  Blood Updated:  06/19/18 0547     pH, Dwain 7 238 (L)     pCO2, Dwain 76 0 (H) mm Hg      pO2, Dwain 57 3 (H) mm Hg      HCO3, Dwain 31 7 (H) mmol/L      Base Excess, Dwain 2 7 mmol/L      O2 Content, Dwain 12 2 ml/dL      O2 HGB, VENOUS 81 9 (H) %     Comprehensive metabolic panel [[id number]]  (Abnormal) Collected:  06/19/18 0505    Lab Status:  Final result Specimen:  Blood from Arm, Right Updated:  06/19/18 0534     Sodium 139 mmol/L      Potassium 4 5 mmol/L      Chloride 98 (L) mmol/L      CO2 32 mmol/L      Anion Gap 9 mmol/L      BUN 59 (H) mg/dL      Creatinine 2 64 (H) mg/dL      Glucose 40 (L) mg/dL      Calcium 8 5 mg/dL      AST 29 U/L      ALT 37 U/L      Alkaline Phosphatase 92 U/L      Total Protein 7 7 g/dL      Albumin 3 0 (L) g/dL      Total Bilirubin 0 37 mg/dL      eGFR 23 ml/min/1 73sq m     Narrative:         National Kidney Disease Education Program recommendations are as follows:  GFR calculation is accurate only with a steady state creatinine  Chronic Kidney disease less than 60 ml/min/1 73 sq  meters  Kidney failure less than 15 ml/min/1 73 sq  meters  Fingerstick Glucose (POCT) [[id number]]  (Normal) Collected:  06/19/18 0528    Lab Status:  Final result Updated:  06/19/18 0530     POC Glucose 120 mg/dl     Troponin I [[id number]]  (Normal) Collected:  06/19/18 0445    Lab Status:  Final result Specimen:  Blood from Arm, Right Updated:  06/19/18 0528     Troponin I <0 02 ng/mL     Lactic acid, plasma [[id number]]  (Abnormal) Collected:  06/19/18 0445    Lab Status:  Final result Specimen:  Blood from Arm, Right Updated:  06/19/18 0521     LACTIC ACID 5 7 (HH) mmol/L     Narrative:         Result may be elevated if tourniquet was used during collection      CBC and differential [[id number]]  (Abnormal) Collected:  06/19/18 0505    Lab Status:  Final result Specimen:  Blood from Arm, Right Updated:  06/19/18 0518     WBC 19 60 (H) Thousand/uL      RBC 4 38 Million/uL      Hemoglobin 12 3 g/dL      Hematocrit 39 8 %      MCV 91 fL      MCH 28 1 pg      MCHC 30 9 (L) g/dL      RDW 15 2 (H) %      MPV 10 6 fL      Platelets 828 Thousands/uL      nRBC 0 /100 WBCs      Neutrophils Relative 87 (H) %      Immat GRANS % 1 %      Lymphocytes Relative 6 (L) %      Monocytes Relative 6 %      Eosinophils Relative 0 %      Basophils Relative 0 %      Neutrophils Absolute 16 86 (H) Thousands/µL      Immature Grans Absolute 0 25 (H) Thousand/uL      Lymphocytes Absolute 1 14 Thousands/µL      Monocytes Absolute 1 24 (H) Thousand/µL      Eosinophils Absolute 0 06 Thousand/µL      Basophils Absolute 0 05 Thousands/µL     Fingerstick Glucose (POCT) [[id number]]  (Normal) Collected:  06/19/18 0502    Lab Status:  Final result Updated:  06/19/18 0504     POC Glucose 121 mg/dl     Blood gas, venous [[id number]]  (Abnormal) Collected:  06/19/18 0445    Lab Status:  Final result Specimen:  Blood from Arm, Right Updated:  06/19/18 0453     pH, Dwain 7 175 (L)     pCO2, Dwain 90 0 (H) mm Hg      pO2, Dwain 38 5 mm Hg      HCO3, Dwain 32 5 (H) mmol/L      Base Excess, Dwain 1 4 mmol/L      O2 Content, Dwain 11 1 ml/dL      O2 HGB, VENOUS 59 8 (L) %     Fingerstick Glucose (POCT) [[id number]]  (Abnormal) Collected:  06/19/18 0422    Lab Status:  Final result Updated:  06/19/18 0423     POC Glucose 37 (LL) mg/dl                  XR chest portable ICU   Final Result by Sandro Amaro MD (06/20 1290)      Enlargement of cardiac silhouette with small right-sided pleural effusion but no evidence of pulmonary edema            Workstation performed: [id number]         XR chest portable   Final Result by Balbir Linn MD (06/19 9999)      Tip of PICC line in superior vena cava              Workstation performed: [id number]         XR chest 2 views   Final Result by Elvis Sherwood MD (06/19 0217)      Small right pleural effusion  Otherwise unremarkable  Workstation performed: [id number]         CT chest abdomen pelvis wo contrast   Final Result by Homero Stein DO (06/19 8575)   1  No acute findings in the chest, abdomen or pelvis  2   Markedly urinary bladder distention  Correlate for neurogenic bladder versus bladder obstruction from prostatic carcinoma  3   Constipation  Workstation performed: [id number]         CT spine cervical without contrast   Final Result by Homero Stein DO (06/19 4987)   1  No acute cervical spine fracture or traumatic malalignment  2   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  Workstation performed: [id number]         CT head without contrast   Final Result by Homero Stein DO (06/19 8287)   Mild cerebral atrophy with chronic small vessel ischemic change  No acute intracranial abnormality  Workstation performed: [id number]               Procedures  Procedures      Phone Consults  ED Phone Contact    ED Course  ED Course as of Jun 22 0712   Tue Jun 19, 2018   6406 Procedure Note: EKG  Date/Time: 06/19/18 5:06 AM   Performed by: Rana Koyanagi  Authorized by: Rana Koyanagi  Indications / Diagnosis: Dizziness  ECG reviewed by me, the ED Provider: yes   The EKG demonstrates:  Rhythm: normal sinus  Intervals: normal intervals  Axis: normal axis  QRS/Blocks: RBBB  ST Changes: No acute ST Changes, no STD/VINI       0506 Temperature: (!) 94 5 °F (34 7 °C)   0513 Patient is more awake now, currently no complaints, he did removed his own cervical collar despite discussing with him that we should obtain imaging prior to removing the collar    Patient understands the risks and chose to remove the collar on his own    9237 Patient placed on capnography, after glucose administration patient now is more awake and capnography shows end-tidal of 39-40 pCO2, Dwain: (!) 90 0   0612 Patient had an acute decrease in his blood pressure, systolics in the 84G, decreased mental status, patient given normal saline bolus and started on norepinephrine, 10 with good response of his blood pressure and mentation  Patient now complaining of left upper thigh pain  The compartments are soft, there is a small abrasion over the anterior thigh without any obvious deformity    0615 Hemoglobin, Istat: (!) 9 5   0618 Repeat EKG without changes    0637 LACTIC ACID: (!!) 2 6   0638 WBC: (!) 15 28   0649 Will hold levophed and observe off pressors   Blood Pressure: (!) 163/116   0719 Pt w / history of prostate CA, most recent PSA normal    0719 Patient again with hypotension, decreased mental status, restarted on levophed with improvement    0726 Patient on 10 mcg/min of levophed, blood pressure improved, mentation at baseline Blood Pressure: (!) 75/42   0735 Patient absolutely declined BiPAP when asked previously as well as now secondary to 'claustrophobia' , informed patient that if his mental status worsens he may require to be intubated , patient agreable  Also discussed that patient will need a central line                                 [facility]  Number of Diagnoses or Management Options  Diagnosis management comments: 81-year-old male presents for evaluation of fall associated with dizziness  Patient hypoglycemic on initial evaluation  Will monitor her glucose closely, will obtain cardiac workup, CT head and cervical spine    Will admit patient for further care    CritCare Time    Disposition  Final diagnoses:   Shock ([facility] [address] )   Elevated lactic acid level   Leukocytosis   MARANDA (acute kidney injury) ([facility] [address] )   Abrasions of multiple sites     Time reflects when diagnosis was documented in both MDM as applicable and the Disposition within this note     Time User Action Codes Description Comment    6/19/2018  7:33 AM Rubbie Masoud Add [R57 9] Shock ([address] )     6/19/2018  7:33 AM Rubbie Masoud Add [R79 89] Elevated lactic acid level     6/19/2018  7:33 AM Naz Prim Add [D72 829] Leukocytosis     6/19/2018  7:38 AM Naz Prim Add [N17 9] MARANDA (acute kidney injury) ([facility] [address] )     6/19/2018  7:38 AM Naz Prim Add [T07  XXXA] Abrasions of multiple sites     6/19/2018  8:44 AM Foster Giffordt Add [E66 9] Obesity (BMI 30-39  9)       ED Disposition     ED Disposition Condition Comment    Admit  Case was discussed with Critical Care and the patient's admission status was agreed to be Admission Status: inpatient status to the service of Dr Mable Gordon   Follow-up Information     Follow up With Specialties Details Why [facility]  Follow up  [address]  [phone]            Current Discharge Medication List      CONTINUE these medications which have NOT CHANGED    Details   albuterol (2 5 mg/3 mL) 0 083 % nebulizer solution Inhale 1 each      aspirin (ECOTRIN LOW STRENGTH) 81 mg EC tablet Take 1 tablet by mouth daily      budesonide-formoterol (SYMBICORT) 160-4 5 mcg/act inhaler Inhale 2 puffs 2 (two) times a day      ferrous sulfate 325 (65 Fe) mg tablet Take 325 mg by mouth daily with breakfast      fluticasone (FLONASE) 50 mcg/act nasal spray 1 spray into each nostril daily  Qty: 16 g, Refills: 0    Associated Diagnoses: Chronic respiratory failure with hypoxia and hypercapnia (HCC)      furosemide (LASIX) 40 mg tablet Take 1 tablet (40 mg total) by mouth 2 (two) times a day  Qty: 60 tablet, Refills: 0    Associated Diagnoses: Acute on chronic diastolic (congestive) heart failure ([facility] [address] )      ! ! insulin regular (HumuLIN R U-500) 500 units/mL CONCENTRATED injection Inject 0 09 mL (45 Units total) under the skin 2 (two) times a day before breakfast and lunch  Qty: 10 mL, Refills: 0    Associated Diagnoses: Chronic respiratory failure with hypoxia and hypercapnia ([facility] [address] );  Type 2 diabetes mellitus with complication, with long-term current use of insulin ([facility] )      ! ! insulin regular (HumuLIN R U-500) 500 units/mL CONCENTRATED injection Inject 0 05 mL (25 Units total) under the skin daily before dinner  Qty: 10 mL, Refills: 0    Associated Diagnoses: Chronic respiratory failure with hypoxia and hypercapnia ([facility] [address] ); Type 2 diabetes mellitus with complication, with long-term current use of insulin ([facility])      Insulin Syringe/Needle U-500 (BD INSULIN SYRINGE U-500) 31G X 6MM 0 5 ML MISC by Does not apply route      lisinopril (ZESTRIL) 10 mg tablet Take 10 mg by mouth daily Patient unsure of dose of lisinopril       metoprolol tartrate (LOPRESSOR) 25 mg tablet Take 1 tablet by mouth 2 (two) times a day      Omega-3 Fatty Acids (FISH OIL) 645 MG CAPS Take 1 capsule by mouth 2 (two) times a day      oxyCODONE-acetaminophen (PERCOCET) 7 5-325 MG per tablet Take by mouth every 4 (four) hours      pantoprazole (PROTONIX) 40 mg tablet Take 1 tablet by mouth 2 (two) times a day before meals  Qty: 60 tablet, Refills: 0      potassium chloride (K-DUR,KLOR-CON) 20 mEq tablet Take 1 tablet (20 mEq total) by mouth 2 (two) times a day  Qty: 60 tablet, Refills: 0    Associated Diagnoses: Chronic respiratory failure with hypoxia and hypercapnia ([facility])      simvastatin (ZOCOR) 40 mg tablet Take 40 mg by mouth daily  Refills: 5      tamsulosin (FLOMAX) 0 4 mg Take 1 capsule (0 4 mg total) by mouth daily with dinner  Qty: 30 capsule, Refills: 0    Associated Diagnoses: Urinary frequency      tiotropium (SPIRIVA) 18 mcg inhalation capsule Place 18 mcg into inhaler and inhale daily       ! ! - Potential duplicate medications found  Please discuss with provider  No discharge procedures on file  ED Provider  Attending physically available and evaluated Amrita Raymundo I managed the patient along with the ED Attending      Electronically Signed by         Hugo Layne MD  06/22/18 3842

## 2018-06-20 ENCOUNTER — APPOINTMENT (INPATIENT)
Dept: RADIOLOGY | Facility: HOSPITAL | Age: 73
DRG: 871 | End: 2018-06-20
Payer: COMMERCIAL

## 2018-06-20 LAB
ANION GAP SERPL CALCULATED.3IONS-SCNC: 3 MMOL/L (ref 4–13)
ARTERIAL PATENCY WRIST A: YES
BASE EXCESS BLDA CALC-SCNC: 6.2 MMOL/L
BASOPHILS # BLD AUTO: 0.03 THOUSANDS/ΜL (ref 0–0.1)
BASOPHILS NFR BLD AUTO: 0 % (ref 0–1)
BUN SERPL-MCNC: 35 MG/DL (ref 5–25)
CALCIUM SERPL-MCNC: 7.8 MG/DL (ref 8.3–10.1)
CHLORIDE SERPL-SCNC: 102 MMOL/L (ref 100–108)
CO2 SERPL-SCNC: 35 MMOL/L (ref 21–32)
CREAT SERPL-MCNC: 1.37 MG/DL (ref 0.6–1.3)
EOSINOPHIL # BLD AUTO: 0.08 THOUSAND/ΜL (ref 0–0.61)
EOSINOPHIL NFR BLD AUTO: 1 % (ref 0–6)
ERYTHROCYTE [DISTWIDTH] IN BLOOD BY AUTOMATED COUNT: 15.5 % (ref 11.6–15.1)
GFR SERPL CREATININE-BSD FRML MDRD: 52 ML/MIN/1.73SQ M
GLUCOSE SERPL-MCNC: 194 MG/DL (ref 65–140)
GLUCOSE SERPL-MCNC: 197 MG/DL (ref 65–140)
GLUCOSE SERPL-MCNC: 207 MG/DL (ref 65–140)
GLUCOSE SERPL-MCNC: 247 MG/DL (ref 65–140)
GLUCOSE SERPL-MCNC: 272 MG/DL (ref 65–140)
GLUCOSE SERPL-MCNC: 486 MG/DL (ref 65–140)
HCO3 BLDA-SCNC: 34 MMOL/L (ref 22–28)
HCT VFR BLD AUTO: 32.6 % (ref 36.5–49.3)
HGB BLD-MCNC: 9.6 G/DL (ref 12–17)
IMM GRANULOCYTES # BLD AUTO: 0.06 THOUSAND/UL (ref 0–0.2)
IMM GRANULOCYTES NFR BLD AUTO: 1 % (ref 0–2)
LYMPHOCYTES # BLD AUTO: 0.99 THOUSANDS/ΜL (ref 0.6–4.47)
LYMPHOCYTES NFR BLD AUTO: 14 % (ref 14–44)
MCH RBC QN AUTO: 27.3 PG (ref 26.8–34.3)
MCHC RBC AUTO-ENTMCNC: 29.4 G/DL (ref 31.4–37.4)
MCV RBC AUTO: 93 FL (ref 82–98)
MONOCYTES # BLD AUTO: 0.6 THOUSAND/ΜL (ref 0.17–1.22)
MONOCYTES NFR BLD AUTO: 9 % (ref 4–12)
NASAL CANNULA: 3
NEUTROPHILS # BLD AUTO: 5.12 THOUSANDS/ΜL (ref 1.85–7.62)
NEUTS SEG NFR BLD AUTO: 75 % (ref 43–75)
NRBC BLD AUTO-RTO: 0 /100 WBCS
O2 CT BLDA-SCNC: 14.7 ML/DL (ref 16–23)
OXYHGB MFR BLDA: 95.2 % (ref 94–97)
PCO2 BLDA: 67.8 MM HG (ref 36–44)
PH BLDA: 7.32 [PH] (ref 7.35–7.45)
PLATELET # BLD AUTO: 134 THOUSANDS/UL (ref 149–390)
PMV BLD AUTO: 10.3 FL (ref 8.9–12.7)
PO2 BLDA: 87.5 MM HG (ref 75–129)
POTASSIUM SERPL-SCNC: 5 MMOL/L (ref 3.5–5.3)
RBC # BLD AUTO: 3.52 MILLION/UL (ref 3.88–5.62)
SODIUM SERPL-SCNC: 140 MMOL/L (ref 136–145)
WBC # BLD AUTO: 6.88 THOUSAND/UL (ref 4.31–10.16)

## 2018-06-20 PROCEDURE — 36600 WITHDRAWAL OF ARTERIAL BLOOD: CPT

## 2018-06-20 PROCEDURE — 94640 AIRWAY INHALATION TREATMENT: CPT

## 2018-06-20 PROCEDURE — 99233 SBSQ HOSP IP/OBS HIGH 50: CPT | Performed by: INTERNAL MEDICINE

## 2018-06-20 PROCEDURE — 82805 BLOOD GASES W/O2 SATURATION: CPT | Performed by: NURSE PRACTITIONER

## 2018-06-20 PROCEDURE — 82948 REAGENT STRIP/BLOOD GLUCOSE: CPT

## 2018-06-20 PROCEDURE — 71045 X-RAY EXAM CHEST 1 VIEW: CPT

## 2018-06-20 PROCEDURE — 80048 BASIC METABOLIC PNL TOTAL CA: CPT | Performed by: NURSE PRACTITIONER

## 2018-06-20 PROCEDURE — 85025 COMPLETE CBC W/AUTO DIFF WBC: CPT | Performed by: NURSE PRACTITIONER

## 2018-06-20 PROCEDURE — 94760 N-INVAS EAR/PLS OXIMETRY 1: CPT

## 2018-06-20 PROCEDURE — 87040 BLOOD CULTURE FOR BACTERIA: CPT | Performed by: INTERNAL MEDICINE

## 2018-06-20 RX ORDER — SODIUM CHLORIDE FOR INHALATION 0.9 %
3 VIAL, NEBULIZER (ML) INHALATION
Status: DISCONTINUED | OUTPATIENT
Start: 2018-06-20 | End: 2018-06-21

## 2018-06-20 RX ORDER — TAMSULOSIN HYDROCHLORIDE 0.4 MG/1
0.4 CAPSULE ORAL
Status: DISCONTINUED | OUTPATIENT
Start: 2018-06-20 | End: 2018-06-22 | Stop reason: HOSPADM

## 2018-06-20 RX ORDER — SODIUM CHLORIDE FOR INHALATION 0.9 %
3 VIAL, NEBULIZER (ML) INHALATION
Status: DISCONTINUED | OUTPATIENT
Start: 2018-06-20 | End: 2018-06-20

## 2018-06-20 RX ORDER — BUDESONIDE AND FORMOTEROL FUMARATE DIHYDRATE 160; 4.5 UG/1; UG/1
2 AEROSOL RESPIRATORY (INHALATION) 2 TIMES DAILY
Status: DISCONTINUED | OUTPATIENT
Start: 2018-06-20 | End: 2018-06-22 | Stop reason: HOSPADM

## 2018-06-20 RX ORDER — ALBUTEROL SULFATE 90 UG/1
2 AEROSOL, METERED RESPIRATORY (INHALATION) EVERY 4 HOURS PRN
Status: DISCONTINUED | OUTPATIENT
Start: 2018-06-20 | End: 2018-06-22 | Stop reason: HOSPADM

## 2018-06-20 RX ORDER — OXYCODONE HYDROCHLORIDE AND ACETAMINOPHEN 5; 325 MG/1; MG/1
1 TABLET ORAL EVERY 6 HOURS PRN
Status: DISCONTINUED | OUTPATIENT
Start: 2018-06-20 | End: 2018-06-22 | Stop reason: HOSPADM

## 2018-06-20 RX ADMIN — INSULIN LISPRO 12 UNITS: 100 INJECTION, SOLUTION INTRAVENOUS; SUBCUTANEOUS at 17:32

## 2018-06-20 RX ADMIN — Medication 2 TABLET: at 08:12

## 2018-06-20 RX ADMIN — ASPIRIN 81 MG: 81 TABLET, COATED ORAL at 08:11

## 2018-06-20 RX ADMIN — CHLORHEXIDINE GLUCONATE 15 ML: 1.2 RINSE ORAL at 08:11

## 2018-06-20 RX ADMIN — LEVALBUTEROL HYDROCHLORIDE 1.25 MG: 1.25 SOLUTION, CONCENTRATE RESPIRATORY (INHALATION) at 20:27

## 2018-06-20 RX ADMIN — HEPARIN SODIUM 7500 UNITS: 5000 INJECTION, SOLUTION INTRAVENOUS; SUBCUTANEOUS at 14:10

## 2018-06-20 RX ADMIN — INSULIN LISPRO 4 UNITS: 100 INJECTION, SOLUTION INTRAVENOUS; SUBCUTANEOUS at 21:40

## 2018-06-20 RX ADMIN — INSULIN LISPRO 6 UNITS: 100 INJECTION, SOLUTION INTRAVENOUS; SUBCUTANEOUS at 13:18

## 2018-06-20 RX ADMIN — BACITRACIN ZINC 1 SMALL APPLICATION: 500 OINTMENT TOPICAL at 08:12

## 2018-06-20 RX ADMIN — PRAVASTATIN SODIUM 80 MG: 80 TABLET ORAL at 15:58

## 2018-06-20 RX ADMIN — OXYCODONE HYDROCHLORIDE AND ACETAMINOPHEN 1 TABLET: 5; 325 TABLET ORAL at 21:52

## 2018-06-20 RX ADMIN — Medication 325 MG: at 08:11

## 2018-06-20 RX ADMIN — PANTOPRAZOLE SODIUM 40 MG: 40 TABLET, DELAYED RELEASE ORAL at 08:11

## 2018-06-20 RX ADMIN — OXYCODONE HYDROCHLORIDE AND ACETAMINOPHEN 1 TABLET: 5; 325 TABLET ORAL at 15:57

## 2018-06-20 RX ADMIN — BUDESONIDE AND FORMOTEROL FUMARATE DIHYDRATE 2 PUFF: 160; 4.5 AEROSOL RESPIRATORY (INHALATION) at 17:37

## 2018-06-20 RX ADMIN — BACITRACIN ZINC 1 SMALL APPLICATION: 500 OINTMENT TOPICAL at 17:31

## 2018-06-20 RX ADMIN — LEVALBUTEROL HYDROCHLORIDE 1.25 MG: 1.25 SOLUTION, CONCENTRATE RESPIRATORY (INHALATION) at 07:38

## 2018-06-20 RX ADMIN — BUDESONIDE 0.5 MG: 0.5 INHALANT RESPIRATORY (INHALATION) at 07:38

## 2018-06-20 RX ADMIN — LEVALBUTEROL HYDROCHLORIDE 1.25 MG: 1.25 SOLUTION, CONCENTRATE RESPIRATORY (INHALATION) at 00:15

## 2018-06-20 RX ADMIN — ISODIUM CHLORIDE 3 ML: 0.03 SOLUTION RESPIRATORY (INHALATION) at 20:27

## 2018-06-20 RX ADMIN — IPRATROPIUM BROMIDE 0.5 MG: 0.5 SOLUTION RESPIRATORY (INHALATION) at 14:36

## 2018-06-20 RX ADMIN — HEPARIN SODIUM 7500 UNITS: 5000 INJECTION, SOLUTION INTRAVENOUS; SUBCUTANEOUS at 21:35

## 2018-06-20 RX ADMIN — FLUTICASONE PROPIONATE 1 SPRAY: 50 SPRAY, METERED NASAL at 08:12

## 2018-06-20 RX ADMIN — PANTOPRAZOLE SODIUM 40 MG: 40 TABLET, DELAYED RELEASE ORAL at 15:58

## 2018-06-20 RX ADMIN — LEVALBUTEROL HYDROCHLORIDE 1.25 MG: 1.25 SOLUTION, CONCENTRATE RESPIRATORY (INHALATION) at 14:36

## 2018-06-20 RX ADMIN — CHLORHEXIDINE GLUCONATE 15 ML: 1.2 RINSE ORAL at 21:35

## 2018-06-20 RX ADMIN — INSULIN LISPRO 2 UNITS: 100 INJECTION, SOLUTION INTRAVENOUS; SUBCUTANEOUS at 05:35

## 2018-06-20 RX ADMIN — INSULIN HUMAN 20 UNITS: 500 INJECTION, SOLUTION SUBCUTANEOUS at 19:50

## 2018-06-20 RX ADMIN — IPRATROPIUM BROMIDE 0.5 MG: 0.5 SOLUTION RESPIRATORY (INHALATION) at 07:38

## 2018-06-20 RX ADMIN — TIOTROPIUM BROMIDE 18 MCG: 18 CAPSULE ORAL; RESPIRATORY (INHALATION) at 17:31

## 2018-06-20 RX ADMIN — TAMSULOSIN HYDROCHLORIDE 0.4 MG: 0.4 CAPSULE ORAL at 15:58

## 2018-06-20 RX ADMIN — IPRATROPIUM BROMIDE 0.5 MG: 0.5 SOLUTION RESPIRATORY (INHALATION) at 00:16

## 2018-06-20 RX ADMIN — HEPARIN SODIUM 7500 UNITS: 5000 INJECTION, SOLUTION INTRAVENOUS; SUBCUTANEOUS at 05:35

## 2018-06-20 NOTE — CASE MANAGEMENT
Initial Clinical Review    Admission: Date/Time/Statement: 6/19/18 @ [phone]     Orders Placed This Encounter   Procedures    Inpatient Admission     Standing Status:   Standing     Number of Occurrences:   1     Order Specific Question:   Admitting Physician     Answer:   Rakan Gray [[id number]]     Order Specific Question:   Level of Care     Answer:   Critical Care [15]     Order Specific Question:   Estimated length of stay     Answer:   More than 2 Midnights     Order Specific Question:   Certification     Answer:   I certify that inpatient services are medically necessary for this patient for a duration of greater than two midnights  See H&P and MD Progress Notes for additional information about the patient's course of treatment  ED: Date/Time/Mode of Arrival:   ED Arrival Information     Expected Arrival Acuity Means of Arrival Escorted By Service Admission Type    6/19/2018 04:10 6/19/2018 04:14 Emergent Ambulance [facility] EMS Critical Care/ICU Emergency    Arrival Complaint    Fall        Chief Complaint:   Chief Complaint   Patient presents with    Multiple Falls     Per EMS report, "Pt  is coming from home where he had a fall tonight after reporting dizziness  BS-55, given 15grams of PO glucogan pre-hospital   Pt  reports to taking insulin prior to EMS arrival  Pt  denies hitting head  Denies LOC  Reports neck pain "  C-collar applied pre-hospital       History of Illness: Rosalba Black  is a 81 y o  male with PMH significant for COPD, HTN, HLD, IDDM type 2, CHF, and CAD who presented to the ED today after a fall at home  The patient states that he was in his usual state of health up until this morning  He got up overnight to use the bathroom and suddenly felt lightheaded and fell  He denies LOC or hitting his head  Patient lives with his son who called EMS  He was noted to have BGL of 50 and was given oral glucose followed by 25g IV dextrose   Patient was brought to the ED where his initial CT head, cspine, CT C/A/P were all negative for traumatic injury, however, he was noted to be lethargic and acutely hypotensive with BP 62/40 and had a lactic acidosis  He was started on levophed and given IV fluids with appropriate response  He was able to be weaned off of levophed rapidly, however, just before being admitted to the floor, he again became acutely hypotensive and again required levophed  Patient was also noted to be acidotic and hypercapnic on VBG, however, the patient adamantly refused BiPAP  His mental status remained appropriate, and he did not require emergent intubation       ED Vital Signs:   ED Triage Vitals   Temperature Pulse Respirations Blood Pressure SpO2   06/19/18 0505 06/19/18 0443 06/19/18 0443 06/19/18 0443 06/19/18 0443   (!) 94 5 °F (34 7 °C) (!) 107 18 134/98 98 %      Temp Source Heart Rate Source Patient Position - Orthostatic VS BP Location FiO2 (%)   06/19/18 0505 06/19/18 0520 06/19/18 0520 06/19/18 0520 --   Oral Monitor Lying Right arm       Pain Score       06/19/18 0520       9        Wt Readings from Last 1 Encounters:   06/19/18 129 kg (285 lb 4 4 oz)     Vital Signs (abnormal):   06/20/18 0900  --  84   24  103/72  83  96 %  --  --   06/20/18 0800  97 7 °F (36 5 °C)  88   24  107/55  67  93 %  --  --   06/20/18 0631  --  84  18  118/59  91  99 %  --  --   06/20/18 0612  --  86  18   106/49  68  100 %  --  --   06/20/18 0331  --  86  18   106/48  68  99 %  --  --   06/20/18 0201  --  90  18   111/48  68  96 %  --  --   06/20/18 0132  --  92  20   105/49  66  98 %  --  --   06/19/18 2000  --  92  19  96/55  65   67 %  --  --   06/19/18 1900  --  102   27  96/61  75   82 %  --  --   06/19/18 1658  98 3 °F (36 8 °C)  86  18   105/46  67  98 %  Nasal cannula  --   06/19/18 1558  --  84  15   125/48  73  100 %  --  --   06/19/18 1428  --  88  18   108/46  63  100 %  --  --   06/19/18 1358  --  86  18   91/48  71  100 %  --  --   06/19/18 1228  --  84  20   101/45 69  100 %  --  --   06/19/18 1143  --  82   25  153/61  91  96 %  --  --   06/19/18 1128  --  80   23  142/61  103  96 %  --  --   06/19/18 1058  --  82   25  135/65  88  100 %  --  --   06/19/18 1028  --  80   33  119/59  91  100 %  --  --   06/19/18 0958  --  98   29  137/76  95  96 %  --  --   06/19/18 0928  --  82   23   95/44  66  97 %  --  --   06/19/18 0813  --  84   24  96/52  72  95 %  --  --   06/19/18 0803  --  94   26  113/61  81  95 %  --  --   06/19/18 0800  --  94   24  110/63  81  96 %  --  --   06/19/18 0745  --  90   26  106/59  --  95 %  Nasal cannula  --   06/19/18 0730  --  90   24  96/55  --  94 %  Nasal cannula  --   06/19/18 0715  --  82  20   75/42  --  94 %  --  --   06/19/18 0645  --  82   28   163/116  --  93 %  Nasal cannula  --   06/19/18 0630  --  82   26  117/70  --  94 %  Nasal cannula  --   06/19/18 0533  --  72   28   72/42  --  100 %  --  Lying   SpO2: duo-neb at 06/19/18 0533   06/19/18 0520  --  78   26   62/40  --  97 %  Nasal cannula  Lying   06/19/18 0505   94 5 °F (34 7 °C)  --  --  --  --  --  --  --   06/19/18 0443  --   107  18  134/98  --  98 %  Nasal cannula  --     Abnormal Labs:   6/19 6/20   pH, Arterial 7 290 7 318   pCO2, Arterial 64 8 67 8   pO2, Arterial 79 1 87 5   HCO3, Arterial 30 5 34 0   Base Excess, Arterial 2 6 6 2   O2 Content, Arterial 14 7 14 7   O2 HGB,Arterial 92 9 95 2      6/19 6/20   Potassium 5 4 5 4 5 0   CO2 33 33 35   Anion Gap 2 2 3   BUN 57 54 35   Creatinine 2 53 2 30 1 37   Glucose 141 150 194   Calcium 7 6 7 5 7 8     Trop WNL     Lactic acid 2 1  POC glucose 165, 253, 197  WBC 15 28  6 88   RBC 3 76  3 52   Hemoglobin 10 6  9 6   Hematocrit 35 1  32 6   MCV 93  93   MCH 28 2  27 3   MCHC 30 2  29 4   RDW 15 2  15 5   Platelets 927 183 344     Diagnostic Test Results:     6/19 EKG - Normal sinus rhythm  Right bundle branch block  Possible Lateral infarct (cited on or before 27-APR-2018)  Possible Inferior infarct (cited on or before 27-APR-2018)  Abnormal ECG    6/19 CXR - Small right pleural effusion  Otherwise unremarkable  6/19 CT head - Mild cerebral atrophy with chronic small vessel ischemic change  No acute intracranial abnormality  6/20 CXR -  Enlargement of cardiac silhouette with small right-sided pleural effusion but no evidence of pulmonary edema    6/19 Cardiac Echo follow up - EF 65%  There was mild tricuspid regurgitation  Severe pulmonary hypertension with and estimated pulmonary artery pressure of 70mm Hg  ED Treatment:   Medication Administration from 06/19/2018 0414 to 06/19/2018 0827    Date/Time Order Dose Route Action   06/19/2018 0458 dextrose 50 % IV solution 25 mL 50 mL Intravenous Given   06/19/2018 0501 sodium chloride 0 9 % bolus 500 mL 500 mL Intravenous New Bag   06/19/2018 0523 albuterol inhalation solution 5 mg 5 mg Nebulization Given   06/19/2018 0523 ipratropium (ATROVENT) 0 02 % inhalation solution 0 5 mg 0 5 mg Nebulization Given   06/19/2018 0529 sodium chloride 0 9 % bolus 1,000 mL 1,000 mL Intravenous New Bag   06/19/2018 0718 norepinephrine (LEVOPHED) 4 mg (STANDARD CONCENTRATION) IV in sodium chloride 0 9% 250 mL 10 mcg/min Intravenous Restarted   06/19/2018 0548 norepinephrine (LEVOPHED) 4 mg (STANDARD CONCENTRATION) IV in sodium chloride 0 9% 250 mL 10 mcg/min Intravenous New Bag   06/19/2018 0600 ondansetron (ZOFRAN) injection 4 mg 4 mg Intravenous Given   06/19/2018 0749 sodium chloride 0 9 % bolus 1,000 mL 1,000 mL Intravenous New Bag        Past Medical/Surgical History:     Diagnosis    Obesity (BMI 30-39  9)    Dyslipidemia    Type 2 diabetes mellitus with hyperglycemia, with long-term current use of insulin [facility]    Venous stasis dermatitis of both lower extremities    Leukocytosis    Acute on chronic respiratory failure with hypoxia and hypercapnia ([facility]    COPD (chronic obstructive pulmonary disease) ([facility]    Chronic kidney disease, stage 3    Shortness of breath - Chronic respiratory failure with hypoxia    Chronic diastolic heart failure (HCC)    Poor dentition    Tremors of nervous system    Essential hypertension    Diabetic neuropathy ([facility]    Insulin dependent diabetes mellitus     Acute on chronic diastolic (congestive) heart failure    Elevated d-dimer    CAD (coronary artery disease)    Urinary frequency    Hyponatremia       Diagnosis    Coronary artery disease involving native coronary artery without angina pectoris    Chest pain    Gastroesophageal reflux disease without esophagitis    COPD exacerbation ([place] [address] )    H/O prostate cancer    Back pain    Dizziness    Diabetic polyneuropathy associated with type 2 diabetes mellitus ([facility])    Opioid dependence ([facility])    Bilateral lower extremity edema    Hyponatremia    Partial gastric outlet obstruction    Duodenitis    Hypernatremia    Acute duodenal ulcer with bleeding    Acute blood loss anemia    Pleural effusion on right    Accelerated hypertension    Non-ST elevation myocardial infarction (NSTEMI), type 2    NSTEMI type 2      Diagnosis    Abdominal pain    Cardiac disease    CHF (congestive heart failure) ([facility]    COPD (chronic obstructive pulmonary disease) ([facility])    Coronary artery disease    Diabetes mellitus ([place] [address] )    Hyperlipidemia    Hypertension    MI (myocardial infarction) ([place] [address] )    Prostate cancer ([place] [address] )     Admitting Diagnosis: Leukocytosis [D72 829]  Shock ([place] [address] ) [R57 9]  Abrasions of multiple sites [T07  XXXA]  MARANDA (acute kidney injury) ([place] [address] ) [N17 9]  Elevated lactic acid level [R79 89]  Unspecified multiple injuries, initial encounter [T07  XXXA]    Age/Sex: 81 y o  male    Assessment/Plan:    Hypovolemic shock ([place] [address] )  Active Problems:    Respiratory acidosis    MARANDA (acute kidney injury) ([place] [address] )    Lactic acidosis    COPD (chronic obstructive pulmonary disease) ([facility])    Chronic diastolic heart failure ([facility])    Acute metabolic encephalopathy    Chronic kidney disease, stage 3    Type 2 diabetes mellitus with hyperglycemia, with long-term current use of insulin (HCC)    CAD (coronary artery disease)    Leukocytosis    RBBB    Obesity (BMI 30-39  9)    Dyslipidemia      Neuro:   · Acute metabolic encephalopathy- patient alert and oriented at this time  Continue routine neuro checks  Avoid sedating medications  Delirium precautions  Sleep/wake cycle regulation  CAM-ICU daily      CV:   · Shock- Suspected due to hypovolemia  Patient describes fall that appears orthostatic in nature after getting up from lying position  Patient given 2 5L IVF in ED    Will give an additional 1400ml to complete 30ml/kg  Patient started on levophed  Continue to maintain MAP >65  Patient refusing TLC, will obtain PICC  CAD and RBBB with dyslipidemia- continue home ASA and statin  Continue close telemetry monitoring   · Hx diastolic CHF and hypertension- holding home metoprolol 25mg BID, lasix 40mg BID, and lisinopril 10mg daily due to hypotension and hypovolemia  Patient states that he had previously been off his Lasix, however, when he was discharged on 5/26/18 for acute CHF exacerbation, his Lasix was resumed at a double dose  Suspect contribution to shock      Pulm:  · COPD- does not appear to be in acute exacerbation, although marked hypercapnia noted on VBG, obtain ABG  Patient refusing BiPAP secondary to to claustrophobia, but will discuss with patient and attempt to convince him to use mask to avoid intubation  Will hold home Symbicort and Spiriva and start xopenx/ipratropium q6h and budesonide q12h  Maintain SpO2 >88%  Continue pulmonary hygiene  Incentive spirometer q1h while awake, encourage coughing and deep breathing  GI:   · GERD- continue home protonix PO  · Will start bowel regimen       :   · MARANDA superimposed on CKD 3- Baseline creatinine 1 24 - 1 47  Suspect secondary to hypovolemia  Continue fluid resuscitation and monitor   Place patient on urinary retention protocol with bladder scan and straight cath as needed  Strict q4h I/O monitoring  Continue to follow renal function tests  F/E/N:   · Lactic acidosis- continue fluid resuscitation and continue to trend until clear  · Repeat BMP and treat electrolytes as needed  · NPO with sips for meds only at this time due to high vasopressor requirements      Heme/Onc:   · Anemia- hemoglobin at baseline on admission  Continue daily iron supplementation  Continue to trend hemoglobin and platelets  · PPx- SQH 7500 units TID, SCD's to BLE     Endo:   · Insulin dependent type 2 diabetes- patient hypoglycemic on admission  Holding patient's home regimen and placing on q6h SSI and accuchecks  Dextrose as needed  Goal -180  Last A1c 7 9% in May  ID:   · Leukocytosis- patient with temp 94 7 on admission, however, no clear source of sepsis  Procalcitonin <0 5  Patient's history not consistent with new infection  UA negative  BCXx2 pending   Hold off on antibiotics at this time and continue to monitor fever and WBC curve       MSK/Skin:   · Reposition q2h, eliminate pressures points  · Close skin surveillance   · PT/OT when appropriate   · Bacitracin to abrasions      Disposition: critical care      Code Status: Level 2 - DNAR: but accepts endotracheal intubation    Admission Orders:  Scheduled Meds:   Current Facility-Administered Medications:  aspirin 81 mg Oral Daily    bacitracin 1 small application Topical BID    budesonide 0 5 mg Nebulization Q12H    chlorhexidine 15 mL Swish & Spit Q12H [address]    ferrous sulfate 325 mg Oral Daily With Breakfast    fluticasone 1 spray Nasal Daily    heparin (porcine) 7,500 Units Subcutaneous Q8H [address]    insulin lispro 2-12 Units Subcutaneous Q6H [address]    ipratropium 0 5 mg Nebulization Q6H    levalbuterol 1 25 mg Nebulization Q6H    norepinephrine 1-30 mcg/min Intravenous Titrated Last Rate: Stopped (06/20/18 1282)   ondansetron 4 mg Intravenous Q4H PRN    pantoprazole 40 mg Oral BID AC    pravastatin 80 mg Oral Daily With Dinner    senna-docusate sodium 2 tablet Oral BID      Continuous Infusions:   norepinephrine 1-30 mcg/min Last Rate: Stopped (06/20/18 0432)     PRN Meds: ondansetron    MICU  Neuro checks q 2 hr   POC glucose q 6 hr   Urinary retention protocol   SCDs  Arterial line   Diet Mani/CHO Controlled; Consistent Carbohydrate Diet Level 2 (5 carb servings/75 grams CHO/meal); Sodium 4 GM (ELIZABETH)  I/O q 4 hr  Oxygen via NC - BIPAP as needed   Respiratory protocol   _____________________  6/20 Critical Care Progress Note   Екатерина Pelletier Sr  is a 81 y  o  male with PMH significant for COPD, HTN, HLD, IDDM type 2, CHF, and CAD who presented 6/19 after a syncopal/presyncopal fall and was found to be hypotensive requiring pressors, hypoglycemic, and hypercapnic       Plan:      Neuro:   1  Acute metabolic encephalopathy              - patient sleeping comfortably, easily awoken, and oriented at this time              - Continue routine neuro checks              - Avoid sedating medications              - Delirium precautions              - Sleep/wake cycle regulation              - CAM-ICU daily   CV:   1  Shock              - Suspected due to hypovolemia              - S/p 30 cc/kg IVF yesterday              -  (baseline)              - levophed off since 4:30 a m               - Goal MAP >65, maintaining off levophed                2  H/o CAD:               - continue home ASA and statin  3  Hx diastolic CHF and hypertension              - holding home metoprolol 25mg BID, lasix 40mg BID, and lisinopril 10mg daily due to hypotension and hypovolemia               - Patient had been restarted on lasix 40 bid after admission 5/26              - Repeat CXR this a m  shows increased vascular congestion following fluid resuscitation but patient clinically well   Pulm:  1   COPD on 3L NC at home              - ABG 7 318/67 8/87 5/34 from 7 29/64 8/79 1/30 5              - Chronic respiratory acidosis with expected metabolic compensation              - On NC 2L, satting 98%               - hold home Symbicort and Spiriva               - Xopenex/ipratropium q6h and budesonide q12h              - Maintain SpO2 >88%               - Continue pulmonary hygiene              - Incentive spirometer q1h while awake              - Encourage coughing and deep breathing   GI:   1  GERD- continue home protonix PO  2  Bowel regimen: sennakot 2 tablets BID   :   1  MARANDA on CKD 3:              - Resolved (Cr 1 37<--2 64)              - Baseline creatinine 1 24 - 1 47  2   Urinary retention: history of prostate CA s/p radiation              - Marked bladder distention on exam               - Urinary retention protocol with bladder scan and straight cath as needed   - Strict q4h I/O monitoring  - Continue to follow renal function tests   F/E/N:   1  Lactic acidosis:   - Resolved (5 7<--1 7)  Fluids:               - S/p bolus              - no infusion              - regular diet              - Goal euvolemia  Electrolytes:              - Replete electrolytes as needed              - Hyperkalemia: resolved (5 0<-5 4)  Nutrition: Carb controlled diet   Heme/Onc:   1  Anemia:              - Hgb 9 6<-10 6              - Continue daily iron supplementation              - Continue to trend hemoglobin and platelets  - PPx: SQH 7500 units TID, SCD's to BLE     Endo:   1  Insulin dependent type 2 diabetes:              - hypoglycemic on admission              - SSI algorithm 4 q6h, switch to with meals              - <-150<141              - Last A1c 7 9% in May   ID:   - Patient with temp 94 7, WBCs 19 6, and lactate 5 7 on admission  - No infection sx  - Procalcitonin <0 5  - CT CAP negative for acute findings  - UA negative  - BCXx2 pending   - Hold off on antibiotics at this time   - continue to monitor fever and WBC curve   MSK/Skin:   - Reposition q2h, eliminate pressure points  - Close skin surveillance   - PT/OT   - Bacitracin to coy     Thank you,  [address] [facility] in the [facility] by Onofre Carroll for 2017  Network Utilization Review Department  Phone: [phone]; Fax [phone]  ATTENTION: The Network Utilization Review Department is now centralized for our 7 Facilities  Make a note that we have a new phone and fax numbers for our Department  Please call with any questions or concerns to [phone] and carefully follow the prompts so that you are directed to the right person  All voicemails are confidential  Fax any determinations, approvals, denials, and requests for initial or continue stay review clinical to [phone]  Due to HIGH CALL volume, it would be easier if you could please send faxed requests to expedite your requests and in part, help us provide discharge notifications faster

## 2018-06-20 NOTE — SOCIAL WORK
Received a call from Evon BARRON CM that patient had concerns about his wife's bills at [employer] and that he cannot afford them  CSWS met with patient to review these concerns  Patient was advised to call Aroldo Segura directly to discuss these bills  Per patient the bills are not due in the near future and he plans to take care of this when he is discharged  Patient also had concerns about his electric bill but stated that his wife signed up for [employer] and he feels this is not an issue anymore

## 2018-06-20 NOTE — PROGRESS NOTES
Progress Note - Critical Care   [place] [facility] [place]  81 [place] o  male MRN: [id number]  Unit/Bed#: MICU 10 Encounter: [id number]    Assessment: Denzel Monterroso  is a 81 y o  male with PMH significant for COPD, HTN, HLD, IDDM type 2, CHF, and CAD who presented 6/19 after a syncopal/presyncopal fall and was found to be hypotensive requiring pressors, hypoglycemic, and hypercapnic  Plan:     Neuro:   1  Acute metabolic encephalopathy   - patient sleeping comfortably, easily awoken, and oriented at this time   - Continue routine neuro checks   - Avoid sedating medications   - Delirium precautions   - Sleep/wake cycle regulation   - CAM-ICU daily      CV:   1  Shock   - Suspected due to hypovolemia   - S/p 30 cc/kg IVF yesterday   -  (baseline)   - levophed off since 4:30 a m    - Goal MAP >65, maintaining off levophed     2  H/o CAD:    - continue home ASA and statin  3  Hx diastolic CHF and hypertension   - holding home metoprolol 25mg BID, lasix 40mg BID, and lisinopril 10mg daily due to hypotension and hypovolemia    - Patient had been restarted on lasix 40 bid after admission 5/26   - Repeat CXR this a m  shows increased vascular congestion following fluid resuscitation but patient clinically well      Pulm:  1  COPD on 3L NC at home   - ABG 7 318/67 8/87 5/34 from 7 29/64 8/79 1/30 5   - Chronic respiratory acidosis with expected metabolic compensation   - On NC 2L, satting 98%    - hold home Symbicort and Spiriva    - Xopenex/ipratropium q6h and budesonide q12h   - Maintain SpO2 >88%    - Continue pulmonary hygiene   - Incentive spirometer q1h while awake   - Encourage coughing and deep breathing      GI:   1  GERD- continue home protonix PO  2  Bowel regimen: sennakot 2 tablets BID       :   1  MARANDA on CKD 3:   - Resolved (Cr 1 37<--2 64)   - Baseline creatinine 1 24 - 1 47  2   Urinary retention: history of prostate CA s/p radiation   - Marked bladder distention on exam    - Urinary retention protocol with bladder scan and straight cath as needed   - Strict q4h I/O monitoring  - Continue to follow renal function tests      F/E/N:   1  Lactic acidosis:   - Resolved (5 7<--1 7)  Fluids:    - S/p bolus   - no infusion   - regular diet   - Goal euvolemia  Electrolytes:   - Replete electrolytes as needed   - Hyperkalemia: resolved (5 0<-5 4)  Nutrition: Carb controlled diet     Heme/Onc:   1  Anemia:   - Hgb 9 6<-10 6   - Continue daily iron supplementation   - Continue to trend hemoglobin and platelets  - PPx: SQH 7500 units TID, SCD's to BLE     Endo:   1  Insulin dependent type 2 diabetes:   - hypoglycemic on admission   - SSI algorithm 4 q6h, switch to with meals   - <-150<141   - Last A1c 7 9% in May      ID:   - Patient with temp 94 7, WBCs 19 6, and lactate 5 7 on admission  - No infection sx  - Procalcitonin <0 5  - CT CAP negative for acute findings  - UA negative  - BCXx2 pending   - Hold off on antibiotics at this time   - continue to monitor fever and WBC curve     MSK/Skin:   - Reposition q2h, eliminate pressure points  - Close skin surveillance   - PT/OT   - Bacitracin to abrasions    Dispo: to floor    Chief Complaint: "I'm doing pretty good"    HPI: Erick Warren  is a 81 y o  male with PMH significant for COPD, HTN, HLD, IDDM type 2, CHF, and CAD who presented to the ED today after a fall at home  The patient states that he was in his usual state of health up until this morning  He got up overnight to use the bathroom and suddenly felt lightheaded and fell  He denies LOC or hitting his head  Patient lives with his son who called EMS  He was noted to have BGL of 50 and was given oral glucose followed by 25g IV dextrose  Patient was brought to the ED where his initial CT head, cspine, CT C/A/P were all negative for traumatic injury, however, he was noted to be lethargic and acutely hypotensive with BP 62/40 and had a lactic acidosis   He was started on levophed and given IV fluids with appropriate response  He was able to be weaned off of levophed rapidly, however, just before being admitted to the floor, he again became acutely hypotensive and again required levophed  Patient was also noted to be acidotic and hypercapnic on VBG, however, the patient adamantly refused BiPAP  His mental status remained appropriate, and he did not require emergent intubation  24hr events: Continued on NC    Physical Exam:     Vitals:    18 0537 18 0612 18 0631 18 0738   BP: 101/51 (!) 106/49 118/59    Pulse: 86 86 84 86   Resp:     Temp:       TempSrc:       SpO2: 99% 100% 99% 98%   Weight:       Height:               Temperature:   Temp (24hrs), Av 2 °F (36 8 °C), Min:97 7 °F (36 5 °C), Max:98 6 °F (37 °C)    Current: Temperature: 98 6 °F (37 °C)    Weights:   IBW: 77 6 kg    Body mass index is 38 69 kg/m²  Weight (last 2 days)     Date/Time   Weight    18 0833  129 (285 28)            Physical Exam   Constitutional: He is oriented to person, place, and time  He appears well-developed and well-nourished  He is sleeping  He is easily aroused  No distress  Obese   HENT:   Head: Normocephalic and atraumatic  Right Ear: External ear normal    Left Ear: External ear normal    Mouth/Throat: No oropharyngeal exudate  NC in place on 2L   Eyes: EOM are normal  Pupils are equal, round, and reactive to light  No scleral icterus  Neck: Normal range of motion  Neck supple  Cardiovascular: Normal rate, regular rhythm and normal heart sounds  Pulmonary/Chest: Effort normal and breath sounds normal  No respiratory distress  Abdominal: Soft  Bowel sounds are normal  There is no tenderness  There is no rebound and no guarding  Musculoskeletal: Normal range of motion  Neurological: He is oriented to person, place, and time and easily aroused  Skin: Skin is warm and dry  No rash noted  Psychiatric: He has a normal mood and affect     Nursing note and vitals reviewed  Hemodynamic Monitoring:  N/A     Non-Invasive/Invasive Ventilation Settings:  Respiratory    Lab Data (Last 4 hours)      06/20 0412            pH, Arterial       (!)7 318           pCO2, Arterial       (!)67 8           pO2, Arterial       87 5           HCO3, Arterial       (!)34 0           Base Excess, Arterial       6 2                O2/Vent Data     None              Lab Results   Component Value Date    PHART 7 318 (L) 06/20/2018    XMZ2COE 67 8 (HH) 06/20/2018    PO2ART 87 5 06/20/2018    ZOO4LAY 34 0 (H) 06/20/2018    BEART 6 2 06/20/2018    SOURCE Radial, Left 06/19/2018     SpO2: SpO2: 98 %    Intake and Outputs:  I/O       06/18 0701 - 06/19 0700 06/19 0701 - 06/20 0700 06/20 0701 - 06/21 0700    I V  (mL/kg)  2978 1 (23 1)     IV Piggyback 1500      Total Intake(mL/kg) 1500 2978 1 (23 1)     Urine (mL/kg/hr)  4400 (1 4)     Total Output   4400      Net +1500 -1421 9                 UOP: 1 4 cc/kg/hour     Nutrition:        Diet Orders            Start     Ordered    06/19/18 1800  Diet Mani/CHO Controlled; Consistent Carbohydrate Diet Level 2 (5 carb servings/75 grams CHO/meal)  Diet effective now     Question Answer Comment   Diet Type Mani/CHO Controlled    Mani/CHO Controlled Consistent Carbohydrate Diet Level 2 (5 carb servings/75 grams CHO/meal)    RD to adjust diet per protocol?  Yes        06/19/18 1759        Labs:     Results from last 7 days  Lab Units 06/20/18  0433 06/19/18  0927 06/19/18  0618 06/19/18  0558 06/19/18  0505   WBC Thousand/uL 6 88  --  15 28*  --  19 60*   HEMOGLOBIN g/dL 9 6*  --  10 6*  --  12 3   I STAT HEMOGLOBIN g/dl  --   --   --  9 5*  --    HEMATOCRIT % 32 6*  --  35 1*  --  39 8   PLATELETS Thousands/uL 134* 146* 136*  --  191   NEUTROS PCT % 75  --  89*  --  87*   MONOS PCT % 9  --  6  --  6      Results from last 7 days  Lab Units 06/20/18  0433 06/19/18  0927 06/19/18  0726  06/19/18  0505   SODIUM mmol/L 140 137 138  --  139   POTASSIUM mmol/L 5 0 5  4* 5 4*  --  4 5   CHLORIDE mmol/L 102 102 103  --  98*   CO2 mmol/L 35* 33* 33*  --  32   BUN mg/dL 35* 54* 57*  --  59*   CREATININE mg/dL 1 37* 2 30* 2 53*  --  2 64*   CALCIUM mg/dL 7 8* 7 5* 7 6*  --  8 5   TOTAL PROTEIN g/dL  --   --   --   --  7 7   BILIRUBIN TOTAL mg/dL  --   --   --   --  0 37   ALK PHOS U/L  --   --   --   --  92   ALT U/L  --   --   --   --  37   AST U/L  --   --   --   --  29   GLUCOSE RANDOM mg/dL 194* 150* 141*  --  40*   GLUCOSE, ISTAT   --   --   --   < >  --    < > = values in this interval not displayed  Results from last 7 days  Lab Units 06/19/18  0927   MAGNESIUM mg/dL 2 5       Results from last 7 days  Lab Units 06/19/18  0927   PHOSPHORUS mg/dL 4 0        Results from last 7 days  Lab Units 06/19/18  0617   INR  1 09   PTT seconds 30       Results from last 7 days  Lab Units 06/19/18  0927   LACTIC ACID mmol/L 1 7       0  Lab Value Date/Time   TROPONINI <0 02 06/19/2018 0750   TROPONINI <0 02 06/19/2018 0445       Imaging:   Ct Chest Abdomen Pelvis Wo Contrast: 6/19/2018  Impression: 1  No acute findings in the chest, abdomen or pelvis  2   Markedly urinary bladder distention  Correlate for neurogenic bladder versus bladder obstruction from prostatic carcinoma  3   Constipation  Xr Chest Portable: 6/19/2018  Impression: Tip of PICC line in superior vena cava  Xr Chest 2 Views: 6/19/2018  Impression: Small right pleural effusion  Otherwise unremarkable  Ct Head Without Contrast: 6/19/2018  Impression: Mild cerebral atrophy with chronic small vessel ischemic change  No acute intracranial abnormality  Ct Spine Cervical Without Contrast: 6/19/2018  Impression: 1  No acute cervical spine fracture or traumatic malalignment  2   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  I have personally reviewed pertinent reports        EKG: NSR, RBBB    Micro:  Lab Results   Component Value Date    BLOODCX No Growth at 24 hrs  06/19/2018 URINECX >100,000 cfu/ml Staphylococcus coagulase negative 12/03/2016       Allergies: Allergies   Allergen Reactions    Metformin GI Intolerance       Medications:   Scheduled Meds:    Current Facility-Administered Medications:  aspirin 81 mg Oral Daily YOLANDE Ross    bacitracin 1 small application Topical BID YOLANDE Galvan    budesonide 0 5 mg Nebulization Q12H YOLANDE Galvan    chlorhexidine 15 mL Swish & Spit Q12H [address] YOLANDE Galvan    ferrous sulfate 325 mg Oral Daily With Breakfast YOLANDE Galvan    fluticasone 1 spray Nasal Daily YOLANDE Galvan    heparin (porcine) 7,500 Units Subcutaneous [facility] YOLANDE Ross    insulin lispro 2-12 Units Subcutaneous Q6H [address] YOLANDE Ross    ipratropium 0 5 mg Nebulization Q6H YOLANDE Galvan    levalbuterol 1 25 mg Nebulization Q6H YOLANDE Galvan    norepinephrine 1-30 mcg/min Intravenous Titrated Prakash Dye MD Last Rate: Stopped (06/20/18 0432)   ondansetron 4 mg Intravenous Q4H PRN YOLANDE Galvan    pantoprazole 40 mg Oral BID AC YOLANDE Ross    pravastatin 80 mg [facility]    senna-docusate sodium 2 tablet Oral BID YOLANDE Galvan      Continuous Infusions:    norepinephrine 1-30 mcg/min Last Rate: Stopped (06/20/18 0432)     PRN Meds:    ondansetron 4 mg Q4H PRN       VTE Pharmacologic Prophylaxis: Heparin  VTE Mechanical Prophylaxis: sequential compression device    Invasive lines and devices:   Invasive Devices     Peripherally Inserted Central Catheter Line            PICC Line 05/20/32 Right Basilic less than 1 day          Peripheral Intravenous Line            Peripheral IV 05/23/18 Right Antecubital 27 days    Peripheral IV 06/19/18 Left Antecubital 1 day    Peripheral IV 06/19/18 Right Forearm 1 day              Code Status: Level 2 - DNAR: but accepts endotracheal intubation     Sukhdev Irving MD

## 2018-06-20 NOTE — PROGRESS NOTES
06/20/18 1000   Plan of Care   Comments Provided quiet presence     Assessment Completed by: Unit visit

## 2018-06-20 NOTE — PROGRESS NOTES
Progress Note - ICU Transfer to SD/MS tele   Murtaza Lawrence Sr  81 y o  male MRN: [id number]  [address] [facility]   Unit/Bed#: MICU 95 Encounter: [id number]    Code Status: Level 2 - DNAR: but accepts endotracheal intubation    Reason for ICU admission: Shock    Active problems: Principal Problem:    Hypovolemic shock ([facility] [address] )  Active Problems:    Respiratory acidosis    MARANDA (acute kidney injury) ([facility] [address] )    Lactic acidosis    COPD (chronic obstructive pulmonary disease) ([facility] [address] )    Chronic diastolic heart failure ([facility] [address] )    Acute metabolic encephalopathy    Chronic kidney disease, stage 3    Type 2 diabetes mellitus with hyperglycemia, with long-term current use of insulin (HCC)    CAD (coronary artery disease)    Leukocytosis    RBBB    Obesity (BMI 30-39  9)    Dyslipidemia    History of Present Illness/Summary of clinical course:    Murtaza Lawrence Sr  is a 81 y o  male with PMH significant for COPD, HTN, HLD, IDDM type 2, CHF, and CAD who presented to the ED today after a fall at home  The patient states that he was in his usual state of health up until this morning  He got up overnight to use the bathroom and suddenly felt lightheaded and fell  He denies LOC or hitting his head  Patient lives with his son who called EMS  He was noted to have BGL of 50 and was given oral glucose followed by 25g IV dextrose  Patient was brought to the ED where his initial CT head, cspine, CT C/A/P were all negative for traumatic injury, however, he was noted to be lethargic and acutely hypotensive with BP 62/40 and had a lactic acidosis  He was started on levophed and given IV fluids with appropriate response  He was able to be weaned off of levophed rapidly, however, just before being admitted to the floor, he again became acutely hypotensive and again required levophed  Patient was also noted to be acidotic and hypercapnic on VBG, however, the patient adamantly refused BiPAP   He was maintained on nasal cannula, which was weaned to his home 3L  Levophed was also weaned off overnight and he recovered to baseline without further intervention  Recent or scheduled procedures:   R PICC     Outstanding/pending diagnostics:   Blood cultures       Mobilization Plan: OOB as tolerated    Nutrition Plan: Carb controlled diet     Specific Diagnosis Plan:    Acute metabolic encephalopathy              - Resolved     Shock              - Suspected due to hypovolemia              - Goal MAP >65, maintaining off levophed                H/o CAD:               - continue home ASA and statin    Hx diastolic CHF and hypertension              - holding home metoprolol 25mg BID, lasix 40mg BID, and lisinopril 10mg daily due to hypotension and hypovolemia    COPD on 3L NC at home              - Chronic respiratory acidosis with expected metabolic compensation              - On NC 3L, satting 98%               - home Symbicort and Spiriva               - Maintain SpO2 >88%      GERD- continue home protonix PO     MARANDA on CKD 3:              - Resolved (Cr 1 37<--2 64)              - Baseline creatinine 1 24 - 1 47      Insulin dependent type 2 diabetes:              - hypoglycemic on admission              - SSI algorithm 4     Spoke with Dr Brit Matamoros regarding transfer  Patient accepted to his/her service  Please call the medical critical care attending with any questions         Sheri Washington MD

## 2018-06-21 LAB
ALBUMIN SERPL BCP-MCNC: 2.7 G/DL (ref 3.5–5)
ALP SERPL-CCNC: 71 U/L (ref 46–116)
ALT SERPL W P-5'-P-CCNC: 26 U/L (ref 12–78)
ANION GAP SERPL CALCULATED.3IONS-SCNC: 3 MMOL/L (ref 4–13)
AST SERPL W P-5'-P-CCNC: 24 U/L (ref 5–45)
BILIRUB SERPL-MCNC: 0.33 MG/DL (ref 0.2–1)
BUN SERPL-MCNC: 21 MG/DL (ref 5–25)
CALCIUM SERPL-MCNC: 8.3 MG/DL (ref 8.3–10.1)
CHLORIDE SERPL-SCNC: 99 MMOL/L (ref 100–108)
CO2 SERPL-SCNC: 35 MMOL/L (ref 21–32)
CREAT SERPL-MCNC: 1.29 MG/DL (ref 0.6–1.3)
GFR SERPL CREATININE-BSD FRML MDRD: 56 ML/MIN/1.73SQ M
GLUCOSE SERPL-MCNC: 182 MG/DL (ref 65–140)
GLUCOSE SERPL-MCNC: 183 MG/DL (ref 65–140)
GLUCOSE SERPL-MCNC: 253 MG/DL (ref 65–140)
GLUCOSE SERPL-MCNC: 261 MG/DL (ref 65–140)
GLUCOSE SERPL-MCNC: 93 MG/DL (ref 65–140)
GLUCOSE SERPL-MCNC: 99 MG/DL (ref 65–140)
POTASSIUM SERPL-SCNC: 5 MMOL/L (ref 3.5–5.3)
PROT SERPL-MCNC: 6.5 G/DL (ref 6.4–8.2)
SODIUM SERPL-SCNC: 137 MMOL/L (ref 136–145)

## 2018-06-21 PROCEDURE — 94640 AIRWAY INHALATION TREATMENT: CPT

## 2018-06-21 PROCEDURE — 99232 SBSQ HOSP IP/OBS MODERATE 35: CPT | Performed by: INTERNAL MEDICINE

## 2018-06-21 PROCEDURE — 82948 REAGENT STRIP/BLOOD GLUCOSE: CPT

## 2018-06-21 PROCEDURE — 99239 HOSP IP/OBS DSCHRG MGMT >30: CPT | Performed by: INTERNAL MEDICINE

## 2018-06-21 PROCEDURE — G8980 MOBILITY D/C STATUS: HCPCS

## 2018-06-21 PROCEDURE — G8979 MOBILITY GOAL STATUS: HCPCS

## 2018-06-21 PROCEDURE — 80053 COMPREHEN METABOLIC PANEL: CPT | Performed by: INTERNAL MEDICINE

## 2018-06-21 PROCEDURE — G8978 MOBILITY CURRENT STATUS: HCPCS

## 2018-06-21 PROCEDURE — 97163 PT EVAL HIGH COMPLEX 45 MIN: CPT

## 2018-06-21 PROCEDURE — 94760 N-INVAS EAR/PLS OXIMETRY 1: CPT

## 2018-06-21 RX ORDER — SODIUM CHLORIDE FOR INHALATION 0.9 %
3 VIAL, NEBULIZER (ML) INHALATION
Status: DISCONTINUED | OUTPATIENT
Start: 2018-06-22 | End: 2018-06-22

## 2018-06-21 RX ORDER — LEVALBUTEROL 1.25 MG/.5ML
1.25 SOLUTION, CONCENTRATE RESPIRATORY (INHALATION)
Status: DISCONTINUED | OUTPATIENT
Start: 2018-06-22 | End: 2018-06-22

## 2018-06-21 RX ORDER — FUROSEMIDE 40 MG/1
40 TABLET ORAL DAILY
Status: DISCONTINUED | OUTPATIENT
Start: 2018-06-22 | End: 2018-06-22 | Stop reason: HOSPADM

## 2018-06-21 RX ADMIN — INSULIN LISPRO 2 UNITS: 100 INJECTION, SOLUTION INTRAVENOUS; SUBCUTANEOUS at 06:20

## 2018-06-21 RX ADMIN — INSULIN HUMAN 40 UNITS: 500 INJECTION, SOLUTION SUBCUTANEOUS at 08:11

## 2018-06-21 RX ADMIN — LEVALBUTEROL HYDROCHLORIDE 1.25 MG: 1.25 SOLUTION, CONCENTRATE RESPIRATORY (INHALATION) at 09:00

## 2018-06-21 RX ADMIN — LEVALBUTEROL HYDROCHLORIDE 1.25 MG: 1.25 SOLUTION, CONCENTRATE RESPIRATORY (INHALATION) at 20:09

## 2018-06-21 RX ADMIN — ASPIRIN 81 MG: 81 TABLET, COATED ORAL at 08:06

## 2018-06-21 RX ADMIN — Medication 325 MG: at 08:06

## 2018-06-21 RX ADMIN — ISODIUM CHLORIDE 3 ML: 0.03 SOLUTION RESPIRATORY (INHALATION) at 01:43

## 2018-06-21 RX ADMIN — CHLORHEXIDINE GLUCONATE 15 ML: 1.2 RINSE ORAL at 20:22

## 2018-06-21 RX ADMIN — BUDESONIDE AND FORMOTEROL FUMARATE DIHYDRATE 2 PUFF: 160; 4.5 AEROSOL RESPIRATORY (INHALATION) at 17:44

## 2018-06-21 RX ADMIN — ISODIUM CHLORIDE 3 ML: 0.03 SOLUTION RESPIRATORY (INHALATION) at 13:24

## 2018-06-21 RX ADMIN — LEVALBUTEROL HYDROCHLORIDE 1.25 MG: 1.25 SOLUTION, CONCENTRATE RESPIRATORY (INHALATION) at 01:43

## 2018-06-21 RX ADMIN — INSULIN HUMAN 25 UNITS: 500 INJECTION, SOLUTION SUBCUTANEOUS at 15:41

## 2018-06-21 RX ADMIN — BACITRACIN ZINC 1 SMALL APPLICATION: 500 OINTMENT TOPICAL at 08:06

## 2018-06-21 RX ADMIN — FLUTICASONE PROPIONATE 1 SPRAY: 50 SPRAY, METERED NASAL at 08:05

## 2018-06-21 RX ADMIN — TAMSULOSIN HYDROCHLORIDE 0.4 MG: 0.4 CAPSULE ORAL at 15:41

## 2018-06-21 RX ADMIN — INSULIN HUMAN 45 UNITS: 500 INJECTION, SOLUTION SUBCUTANEOUS at 11:06

## 2018-06-21 RX ADMIN — PANTOPRAZOLE SODIUM 40 MG: 40 TABLET, DELAYED RELEASE ORAL at 06:19

## 2018-06-21 RX ADMIN — PRAVASTATIN SODIUM 80 MG: 80 TABLET ORAL at 15:41

## 2018-06-21 RX ADMIN — METOPROLOL TARTRATE 25 MG: 25 TABLET ORAL at 20:22

## 2018-06-21 RX ADMIN — HEPARIN SODIUM 7500 UNITS: 5000 INJECTION, SOLUTION INTRAVENOUS; SUBCUTANEOUS at 23:11

## 2018-06-21 RX ADMIN — INSULIN LISPRO 6 UNITS: 100 INJECTION, SOLUTION INTRAVENOUS; SUBCUTANEOUS at 11:07

## 2018-06-21 RX ADMIN — TIOTROPIUM BROMIDE 18 MCG: 18 CAPSULE ORAL; RESPIRATORY (INHALATION) at 08:06

## 2018-06-21 RX ADMIN — OXYCODONE HYDROCHLORIDE AND ACETAMINOPHEN 1 TABLET: 5; 325 TABLET ORAL at 08:08

## 2018-06-21 RX ADMIN — ISODIUM CHLORIDE 3 ML: 0.03 SOLUTION RESPIRATORY (INHALATION) at 20:09

## 2018-06-21 RX ADMIN — HEPARIN SODIUM 7500 UNITS: 5000 INJECTION, SOLUTION INTRAVENOUS; SUBCUTANEOUS at 13:33

## 2018-06-21 RX ADMIN — BUDESONIDE AND FORMOTEROL FUMARATE DIHYDRATE 2 PUFF: 160; 4.5 AEROSOL RESPIRATORY (INHALATION) at 08:07

## 2018-06-21 RX ADMIN — OXYCODONE HYDROCHLORIDE AND ACETAMINOPHEN 1 TABLET: 5; 325 TABLET ORAL at 20:22

## 2018-06-21 RX ADMIN — PANTOPRAZOLE SODIUM 40 MG: 40 TABLET, DELAYED RELEASE ORAL at 15:41

## 2018-06-21 RX ADMIN — ISODIUM CHLORIDE 3 ML: 0.03 SOLUTION RESPIRATORY (INHALATION) at 08:00

## 2018-06-21 RX ADMIN — LEVALBUTEROL HYDROCHLORIDE 1.25 MG: 1.25 SOLUTION, CONCENTRATE RESPIRATORY (INHALATION) at 13:24

## 2018-06-21 RX ADMIN — HEPARIN SODIUM 7500 UNITS: 5000 INJECTION, SOLUTION INTRAVENOUS; SUBCUTANEOUS at 06:19

## 2018-06-21 NOTE — PROGRESS NOTES
Progress Note - Jeronimo Sloan Sr  1945, 81 y o  male MRN: [id number]    Unit/Bed#: -01 Encounter: [id number]    Primary Care Provider: Jan Kaiser MD   Date and time admitted to hospital: 6/19/2018  4:14 AM    MARANDA (acute kidney injury) [facility])   Assessment & Plan    Suspect prerenal from hypotension and medications  ?septic shock but scource not found vs ??autonomic dysfunction vs other    Improved and near baseline Cr 1 29        Acute metabolic encephalopathy   Assessment & Plan    Resolved and nml now        CAD (coronary artery disease)   Assessment & Plan    Known RCA and diag 1 stents in 2009  Continue ASA/ Metoprolol 25 mg po BID  outpt f/u Dr Brandon Valentine        Chronic diastolic heart failure (HCC)   Assessment & Plan    Preserved EF  Continue BB  Restart furosemide 40 qdaily         COPD (chronic obstructive pulmonary disease) ([facility] [address] )   Assessment & Plan    No exacerbation - f/u outpt  Note JACQUELINE and non adherence to CPAP  Continue albuterol/ flonase inhalers        Leukocytosis   Assessment & Plan    resolved        Type 2 diabetes mellitus with hyperglycemia, with long-term current use of insulin [facility])   Assessment & Plan    Lab Results   Component Value Date    HGBA1C 7 9 (H) 05/24/2018       Recent Labs      06/20/18   2133  06/21/18   0001  06/21/18   0601  06/21/18   1032   POCGLU  207*  183*  182*  261*       Blood Sugar Average: Last 72 hrs:  (P) 195 125   uptitrate U500 to 45u - 45u - 25u - 0        * Hypovolemic shock (HCC)   Assessment & Plan    Improved and near baseline          VTE Pharmacologic Prophylaxis: Heparin  Mechanical: Mechanical VTE prophylaxis in place  Patient Centered Rounds: I have performed bedside rounds with nursing staff today  Discussions with Specialists or Other Care Team Provider:     Education and Discussions with Family / Patient:     Time Spent for Care:    More than 50% of total time spent on counseling and coordination of care as described above     Current Length of Stay: 2 day(s)    Current Patient Status: Inpatient   Certification Statement: The patient will continue to require additional inpatient hospital stay due to as above    Discharge Plan:    Code Status: Level 2 - DNAR: but accepts endotracheal intubation      Subjective: no complaints    Objective:     Vitals:   Temp (24hrs), Av °F (36 7 °C), Min:97 6 °F (36 4 °C), Max:98 3 °F (36 8 °C)    HR:  [] 94  Resp:  [18-] 18  BP: (103-143)/(59-68) 140/63  SpO2:  [91 %-99 %] 94 %  Body mass index is 38 87 kg/m²  Input and Output Summary (last 24 hours): Intake/Output Summary (Last 24 hours) at 18 1504  Last data filed at 18 1405   Gross per 24 hour   Intake              540 ml   Output             2000 ml   Net            -1460 ml       Physical Exam   HENT:   Head: Normocephalic  Eyes: Pupils are equal, round, and reactive to light  Cardiovascular: Normal heart sounds  Pulmonary/Chest: Effort normal    Abdominal: Soft  Neurological: He is alert  Skin: There is pallor  Additional Data:     Labs:      Results from last 7 days  Lab Units 18  0433   WBC Thousand/uL 6 88   HEMOGLOBIN g/dL 9 6*   HEMATOCRIT % 32 6*   PLATELETS Thousands/uL 134*   NEUTROS PCT % 75   LYMPHS PCT % 14   MONOS PCT % 9   EOS PCT % 1       Results from last 7 days  Lab Units 18  1045   SODIUM mmol/L 137   POTASSIUM mmol/L 5 0   CHLORIDE mmol/L 99*   CO2 mmol/L 35*   BUN mg/dL 21   CREATININE mg/dL 1 29   CALCIUM mg/dL 8 3   TOTAL PROTEIN g/dL 6 5   BILIRUBIN TOTAL mg/dL 0 33   ALK PHOS U/L 71   ALT U/L 26   AST U/L 24   GLUCOSE RANDOM mg/dL 253*       Results from last 7 days  Lab Units 18  0617   INR  1 09       * I Have Reviewed All Lab Data Listed Above      Imaging:  Imaging Personally Reviewed by Myself Includes:     Cultures:   Blood Culture:   Lab Results   Component Value Date    BLOODCX No Growth at 48 hrs  2018    BLOODCX Staphylococcus coagulase negative (A) 06/19/2018     Urine Culture:   Lab Results   Component Value Date    URINECX >100,000 cfu/ml Staphylococcus coagulase negative 12/03/2016     Sputum Culture: No components found for: SPUTUMCX  Wound Culture: No results found for: WOUNDCULT    Last 24 Hours Medication List:     Current Facility-Administered Medications:  albuterol 2 puff Inhalation Q4H PRN Lor Belle MD    aspirin 81 mg Oral Daily YOLANDE Ross    bacitracin 1 small application Topical BID YOLANDE Coreas    budesonide-formoterol 2 puff Inhalation BID Rajat Regalado MD    chlorhexidine 15 mL Swish & Spit Q12H [address] YOLANDE Coreas    ferrous sulfate 325 mg Oral Daily With Breakfast YOLANDE Coreas    fluticasone 1 spray Nasal Daily YOLANDE Coreas    [START ON 6/22/2018] furosemide 40 mg Oral Daily Breanna Jimenez MD    heparin (porcine) 7,500 Units Subcutaneous Q8H [address] YOLANDE Ross    insulin lispro 2-12 Units Subcutaneous TID AC Lor Belle MD    insulin lispro 2-12 Units Subcutaneous HS Lor Belle MD    insulin regular 25 Units Subcutaneous Before Mollverena Dooley MD    insulin regular 45 Units Subcutaneous BID before breakfast/lunch Breanna Jimenez MD    levalbuterol 1 25 mg Nebulization Q6H YOLANDE Ross    metoprolol tartrate 25 mg Oral Q12H [address] Breanna Jimenez MD    norepinephrine 1-30 mcg/min Intravenous Titrated Pablito Mckeon MD Last Rate: Stopped (06/20/18 0432)   ondansetron 4 mg Intravenous Q4H PRN YOLANDE Coreas    oxyCODONE-acetaminophen 1 tablet Oral Q6H PRN Rajat Regalado MD    pantoprazole 40 mg Oral BID AC YOLANDE Ross    pravastatin 80 mg Oral Daily With HaroonYOLANDE Dorantes    senna-docusate sodium 2 tablet Oral BID YOLANDE Ross    sodium chloride 3 mL Nebulization Q6H Breanna Jimenez MD    tamsulosin 0 4 mg Oral Daily With Mumtaz Moreno MD    tiotropium 18 mcg Inhalation Daily Missy Anthony MD         Today, Patient Was Seen By: Kathia Meier MD    ** Please Note: Dragon 360 Dictation voice to text software may have been used in the creation of this document   **

## 2018-06-21 NOTE — ASSESSMENT & PLAN NOTE
No exacerbation - f/u outpt  Note JACQUELINE and non adherence to CPAP  Continue albuterol/ flonase inhalers

## 2018-06-21 NOTE — PROGRESS NOTES
06/21/18 [address] Affiliation None   Spiritual Beliefs/Perceptions   Support Systems Children;Spouse/significant other   Stress Factors   Patient Stress Factors Health changes; Financial concerns   Coping Responses   Patient Coping Open/discussion   Plan of Care   Comments Established relationship of care and support; referred to pt because of CM/SW; discussed current treatment and financial concerns; discussed power of attourney and provided [employer] for [address]; provided education about pastoral care services and provided encouragement   Assessment Completed by: Unit visit

## 2018-06-21 NOTE — ASSESSMENT & PLAN NOTE
Suspect prerenal from hypotension and medications  ?septic shock but scource not found vs ??autonomic dysfunction vs other    Improved and near baseline Cr 1 29

## 2018-06-21 NOTE — ASSESSMENT & PLAN NOTE
Lab Results   Component Value Date    HGBA1C 7 9 (H) 05/24/2018       Recent Labs      06/20/18   2133  06/21/18   0001  06/21/18   0601  06/21/18   1032   POCGLU  207*  183*  182*  261*       Blood Sugar Average: Last 72 hrs:  (P) 195 125   uptitrate U500 to 45u - 45u - 25u - 0

## 2018-06-21 NOTE — SOCIAL WORK
MCG Guide Used for Initial Round: Systemic or Infectious Condition GRG  Optimal GLOS: 4  Hospital Day: 2  DC Readiness:  Hemodynamic stability as indicated by 1 or more of the following:   Hemodynamic abnormalities at baseline or acceptable for next level of care   Patient hemodynamically stable as indicated by ALL of the following(13)(64)(105)(106)(107): Tachycardia absent   Hypotension absent   No evidence of inadequate perfusion (eg, no myocardial ischemia)   No other hemodynamic abnormalities (eg, no Orthostatic vital sign changes)    Respiratory status acceptable as indicated by ALL of the following(14)(108):    Patient breathing comfortably (or near baseline) and able to protect airway (eg, able to handle secretions)   Tachypnea absent   Oxygen saturation greater than 90%, near baseline, or measurement not indicated for condition   Supplemental oxygen or respiratory treatments not needed or are performable at next level of care   Airflow measurements greater than 60% of predicted, at stable baseline, or measurement not indicated for condition   Partial pressure of carbon dioxide and pH normal, at stable baseline, or measurement not indicated for condition    Identified Barriers: MARANDA,  CAD, Heart failure  Discussion Date (Time): 06/21/18 with chart review

## 2018-06-21 NOTE — ASSESSMENT & PLAN NOTE
Known RCA and diag 1 stents in 2009  Continue ASA/ Metoprolol 25 mg po BID  outpt f/u Dr Sandra Howard

## 2018-06-22 VITALS
HEIGHT: 72 IN | RESPIRATION RATE: 18 BRPM | DIASTOLIC BLOOD PRESSURE: 59 MMHG | WEIGHT: 286.16 LBS | SYSTOLIC BLOOD PRESSURE: 130 MMHG | HEART RATE: 84 BPM | OXYGEN SATURATION: 96 % | BODY MASS INDEX: 38.76 KG/M2 | TEMPERATURE: 97.9 F

## 2018-06-22 PROBLEM — N17.9 AKI (ACUTE KIDNEY INJURY) (HCC): Status: RESOLVED | Noted: 2018-06-19 | Resolved: 2018-06-22

## 2018-06-22 PROBLEM — R57.1 HYPOVOLEMIC SHOCK (HCC): Status: RESOLVED | Noted: 2018-06-19 | Resolved: 2018-06-22

## 2018-06-22 PROBLEM — E87.2 LACTIC ACIDOSIS: Status: RESOLVED | Noted: 2018-06-19 | Resolved: 2018-06-22

## 2018-06-22 PROBLEM — E87.20 LACTIC ACIDOSIS: Status: RESOLVED | Noted: 2018-06-19 | Resolved: 2018-06-22

## 2018-06-22 PROBLEM — E87.2 RESPIRATORY ACIDOSIS: Status: RESOLVED | Noted: 2018-06-19 | Resolved: 2018-06-22

## 2018-06-22 PROBLEM — E87.29 RESPIRATORY ACIDOSIS: Status: RESOLVED | Noted: 2018-06-19 | Resolved: 2018-06-22

## 2018-06-22 PROBLEM — D72.829 LEUKOCYTOSIS: Status: RESOLVED | Noted: 2017-07-17 | Resolved: 2018-06-22

## 2018-06-22 PROBLEM — G93.41 ACUTE METABOLIC ENCEPHALOPATHY: Status: RESOLVED | Noted: 2018-06-19 | Resolved: 2018-06-22

## 2018-06-22 LAB
ALBUMIN SERPL BCP-MCNC: 2.6 G/DL (ref 3.5–5)
ALP SERPL-CCNC: 65 U/L (ref 46–116)
ALT SERPL W P-5'-P-CCNC: 27 U/L (ref 12–78)
ANION GAP SERPL CALCULATED.3IONS-SCNC: 5 MMOL/L (ref 4–13)
AST SERPL W P-5'-P-CCNC: 25 U/L (ref 5–45)
BACTERIA BLD CULT: ABNORMAL
BASOPHILS # BLD AUTO: 0.03 THOUSANDS/ΜL (ref 0–0.1)
BASOPHILS NFR BLD AUTO: 0 % (ref 0–1)
BILIRUB SERPL-MCNC: 0.46 MG/DL (ref 0.2–1)
BUN SERPL-MCNC: 17 MG/DL (ref 5–25)
CALCIUM SERPL-MCNC: 8.5 MG/DL (ref 8.3–10.1)
CHLORIDE SERPL-SCNC: 98 MMOL/L (ref 100–108)
CO2 SERPL-SCNC: 35 MMOL/L (ref 21–32)
CREAT SERPL-MCNC: 1.19 MG/DL (ref 0.6–1.3)
EOSINOPHIL # BLD AUTO: 0.14 THOUSAND/ΜL (ref 0–0.61)
EOSINOPHIL NFR BLD AUTO: 2 % (ref 0–6)
ERYTHROCYTE [DISTWIDTH] IN BLOOD BY AUTOMATED COUNT: 15.1 % (ref 11.6–15.1)
GFR SERPL CREATININE-BSD FRML MDRD: 62 ML/MIN/1.73SQ M
GLUCOSE SERPL-MCNC: 179 MG/DL (ref 65–140)
GLUCOSE SERPL-MCNC: 215 MG/DL (ref 65–140)
GLUCOSE SERPL-MCNC: 222 MG/DL (ref 65–140)
GRAM STN SPEC: ABNORMAL
HCT VFR BLD AUTO: 31.1 % (ref 36.5–49.3)
HGB BLD-MCNC: 9.7 G/DL (ref 12–17)
IMM GRANULOCYTES # BLD AUTO: 0.1 THOUSAND/UL (ref 0–0.2)
IMM GRANULOCYTES NFR BLD AUTO: 1 % (ref 0–2)
LYMPHOCYTES # BLD AUTO: 1.12 THOUSANDS/ΜL (ref 0.6–4.47)
LYMPHOCYTES NFR BLD AUTO: 16 % (ref 14–44)
MCH RBC QN AUTO: 28 PG (ref 26.8–34.3)
MCHC RBC AUTO-ENTMCNC: 31.2 G/DL (ref 31.4–37.4)
MCV RBC AUTO: 90 FL (ref 82–98)
MONOCYTES # BLD AUTO: 0.66 THOUSAND/ΜL (ref 0.17–1.22)
MONOCYTES NFR BLD AUTO: 10 % (ref 4–12)
NEUTROPHILS # BLD AUTO: 4.86 THOUSANDS/ΜL (ref 1.85–7.62)
NEUTS SEG NFR BLD AUTO: 71 % (ref 43–75)
NRBC BLD AUTO-RTO: 0 /100 WBCS
PLATELET # BLD AUTO: 139 THOUSANDS/UL (ref 149–390)
PMV BLD AUTO: 10.3 FL (ref 8.9–12.7)
POTASSIUM SERPL-SCNC: 4.5 MMOL/L (ref 3.5–5.3)
PROT SERPL-MCNC: 6.3 G/DL (ref 6.4–8.2)
RBC # BLD AUTO: 3.47 MILLION/UL (ref 3.88–5.62)
SODIUM SERPL-SCNC: 138 MMOL/L (ref 136–145)
WBC # BLD AUTO: 6.91 THOUSAND/UL (ref 4.31–10.16)

## 2018-06-22 PROCEDURE — 82948 REAGENT STRIP/BLOOD GLUCOSE: CPT

## 2018-06-22 PROCEDURE — 85025 COMPLETE CBC W/AUTO DIFF WBC: CPT | Performed by: INTERNAL MEDICINE

## 2018-06-22 PROCEDURE — 94760 N-INVAS EAR/PLS OXIMETRY 1: CPT

## 2018-06-22 PROCEDURE — 94640 AIRWAY INHALATION TREATMENT: CPT

## 2018-06-22 PROCEDURE — 80053 COMPREHEN METABOLIC PANEL: CPT | Performed by: INTERNAL MEDICINE

## 2018-06-22 RX ORDER — ALBUTEROL SULFATE 2.5 MG/3ML
2.5 SOLUTION RESPIRATORY (INHALATION) EVERY 4 HOURS PRN
Status: DISCONTINUED | OUTPATIENT
Start: 2018-06-22 | End: 2018-06-22 | Stop reason: HOSPADM

## 2018-06-22 RX ADMIN — LEVALBUTEROL HYDROCHLORIDE 1.25 MG: 1.25 SOLUTION, CONCENTRATE RESPIRATORY (INHALATION) at 07:11

## 2018-06-22 RX ADMIN — CHLORHEXIDINE GLUCONATE 15 ML: 1.2 RINSE ORAL at 09:18

## 2018-06-22 RX ADMIN — INSULIN HUMAN 45 UNITS: 500 INJECTION, SOLUTION SUBCUTANEOUS at 06:25

## 2018-06-22 RX ADMIN — METOPROLOL TARTRATE 25 MG: 25 TABLET ORAL at 09:18

## 2018-06-22 RX ADMIN — HEPARIN SODIUM 7500 UNITS: 5000 INJECTION, SOLUTION INTRAVENOUS; SUBCUTANEOUS at 06:20

## 2018-06-22 RX ADMIN — INSULIN HUMAN 45 UNITS: 500 INJECTION, SOLUTION SUBCUTANEOUS at 11:50

## 2018-06-22 RX ADMIN — INSULIN LISPRO 4 UNITS: 100 INJECTION, SOLUTION INTRAVENOUS; SUBCUTANEOUS at 11:50

## 2018-06-22 RX ADMIN — TIOTROPIUM BROMIDE 18 MCG: 18 CAPSULE ORAL; RESPIRATORY (INHALATION) at 09:19

## 2018-06-22 RX ADMIN — BACITRACIN ZINC 1 SMALL APPLICATION: 500 OINTMENT TOPICAL at 09:18

## 2018-06-22 RX ADMIN — Medication 325 MG: at 09:18

## 2018-06-22 RX ADMIN — FLUTICASONE PROPIONATE 1 SPRAY: 50 SPRAY, METERED NASAL at 09:19

## 2018-06-22 RX ADMIN — BUDESONIDE AND FORMOTEROL FUMARATE DIHYDRATE 2 PUFF: 160; 4.5 AEROSOL RESPIRATORY (INHALATION) at 09:19

## 2018-06-22 RX ADMIN — ASPIRIN 81 MG: 81 TABLET, COATED ORAL at 09:18

## 2018-06-22 RX ADMIN — OXYCODONE HYDROCHLORIDE AND ACETAMINOPHEN 1 TABLET: 5; 325 TABLET ORAL at 06:25

## 2018-06-22 RX ADMIN — PANTOPRAZOLE SODIUM 40 MG: 40 TABLET, DELAYED RELEASE ORAL at 06:20

## 2018-06-22 RX ADMIN — INSULIN LISPRO 4 UNITS: 100 INJECTION, SOLUTION INTRAVENOUS; SUBCUTANEOUS at 06:25

## 2018-06-22 RX ADMIN — ISODIUM CHLORIDE 3 ML: 0.03 SOLUTION RESPIRATORY (INHALATION) at 07:11

## 2018-06-22 RX ADMIN — FUROSEMIDE 40 MG: 40 TABLET ORAL at 09:18

## 2018-06-22 NOTE — RESPIRATORY THERAPY NOTE
RT Protocol Note  Esther Bridges Sr  81 y o  male MRN: [id number]  Unit/Bed#: -01 Encounter: [id number]    Assessment    Principal Problem:    Hypovolemic shock ([place] [address] )  Active Problems:    Obesity (BMI 30-39  9)    Dyslipidemia    Type 2 diabetes mellitus with hyperglycemia, with long-term current use of insulin (HCC)    Leukocytosis    COPD (chronic obstructive pulmonary disease) ([facility])    Chronic kidney disease, stage 3    Chronic diastolic heart failure ([facility])    CAD (coronary artery disease)    Acute metabolic encephalopathy    Lactic acidosis    MARANDA (acute kidney injury) ([place] [address] )    RBBB    Respiratory acidosis      Home Pulmonary Medications:  albuterol       Past Medical History:   Diagnosis Date    Abdominal pain     Cardiac disease     CHF (congestive heart failure) ([facility])     COPD (chronic obstructive pulmonary disease) ([place] [address] )     Coronary artery disease     Diabetes mellitus ([place] [address] )     Hyperlipidemia     Hypertension     MI (myocardial infarction) ([place] [address] )     with 3 stents    Prostate cancer ([place] [address] )      Social History     Social History    Marital status: /Civil Union     Spouse name: N/A    Number of children: N/A    Years of education: N/A     Social History Main Topics    Smoking status: Former Smoker     Packs/day: 1 50     Years: 50 00     Quit date: 9/13/2017    Smokeless tobacco: Never Used    Alcohol use No    Drug use: No    Sexual activity: No     Other Topics Concern    None     Social History Narrative    None       Subjective         Objective    Physical Exam:        Vitals:  Blood pressure 130/59, pulse 84, temperature 97 9 °F (36 6 °C), temperature source Oral, resp  rate 18, height 6' (1 829 m), weight 130 kg (286 lb 2 5 oz), SpO2 96 %        Results from last 7 days  Lab Units 06/20/18  0412 06/19/18  1107   PH ART  7 318* 7 290*   PCO2 ART mm Hg 67 8* 64 8*   PO2 ART mm Hg 87 5 79 1   HCO3 ART mmol/L 34 0* 30 5*   BASE EXC ART mmol/L 6 2 2 6   O2 CONTENT ART mL/dL 14 7* 14 7*   O2 HGB, ARTERIAL % 95 2 92 9*   ABG SOURCE   --  Radial, Left   KRISTIAN TEST  Yes Yes       Imaging and other studies: I have personally reviewed pertinent reports  O2 Device: NC     Plan    Respiratory Plan: Home Bronchodilator Patient pathway        Resp Comments: Pt re evaluated per resp protocol  Pt has had SOB and states that his breathing feels fine  Pt takes albuterol udn prn at home   D/C Xopenex TID and order Albuterol Q4 PRN for SOB,

## 2018-06-22 NOTE — DISCHARGE SUMMARY
Discharge Summary - [facility]    Patient Information: Marcella Estrella  81 y o  male MRN: [id number]  Unit/Bed#: -01 Encounter: [id number]    Discharging Physician / Practitioner: Magaly Yuan MD  PCP: Katina Sher MD  Admission Date: 6/19/2018  Discharge Date: 06/22/18    Reason for Admission: presyncope    Discharge Diagnoses:     Principal Problem (Resolved): Hypovolemic shock (HCC)  Active Problems:    Obesity (BMI 30-39  9)    Dyslipidemia    Type 2 diabetes mellitus with hyperglycemia, with long-term current use of insulin (HCC)    COPD (chronic obstructive pulmonary disease) (HCC)    Chronic kidney disease, stage 3    Chronic diastolic heart failure (HCC)    CAD (coronary artery disease)    RBBB  Resolved Problems:    Leukocytosis    Acute metabolic encephalopathy    Lactic acidosis    MARANDA (acute kidney injury) ([facility] [address] )    Respiratory acidosis      Consultations During Hospital Stay:  · none    Procedures Performed:     · CT head - nil acute  · CT C/A/P - nil acute       Incidental Findings:   · none    Test Results Pending at Discharge (will require follow up):   · none     Outpatient Tests Requested:  · none    Complications: none    Hospital Course:     Tanya Menendez Sr  is a 81 y o  male patient who originally presented to the hospital on 6/19/2018 due to presyncope  He was found to have hypoglycemia and hypotension with lactic acidosis requiring pressors in ICU  CT head and CT chest abdomen and pelvis were unremarkable  He quickly improved and was back to clinical baseline next day  He was continued on IV fluids gently and MARANDA improved to baseline  His insulin was slowly titrated back to home dose and he was discharged in a stable condition      Condition at Discharge: fair     Discharge Day Visit / Exam:     Vitals: Blood Pressure: 130/59 (06/22/18 0730)  Pulse: 84 (06/22/18 0730)  Temperature: 97 9 °F (36 6 °C) (06/22/18 0730)  Temp Source: Oral (06/22/18 0730)  Respirations: 18 (06/22/18 0730)  Height: 6' (182 9 cm) (06/19/18 4146)  Weight - Scale: 130 kg (286 lb 2 5 oz) (06/22/18 0600)  SpO2: 96 % (06/22/18 0730)  Exam:   Physical Exam   HENT:   Head: Normocephalic  Eyes: Pupils are equal, round, and reactive to light  Neck: Normal range of motion  Cardiovascular: Normal heart sounds  Pulmonary/Chest: Effort normal    Abdominal: Soft  Neurological: He is alert  Skin: There is pallor  Discharge instructions/Information to patient and family:   See after visit summary for information provided to patient and family  Provisions for Follow-Up Care:  See after visit summary for information related to follow-up care and any pertinent home health orders  Disposition: Home     Discharge Statement:  I spent 35 minutes discharging the patient  This time was spent on the day of discharge  I had direct contact with the patient on the day of discharge  Greater than 50% of the total time was spent examining patient, answering all patient questions, arranging and discussing plan of care with patient as well as directly providing post-discharge instructions  Additional time then spent on discharge activities  Discharge Medications:  See after visit summary for reconciled discharge medications provided to patient and family  ** Please Note: Dragon 360 Dictation voice to text software may have been used in the creation of this document   **

## 2018-06-22 NOTE — PHYSICAL THERAPY NOTE
Physical Therapy Evaluation     06/22/18 1130   Note Type   Note type Eval only   Pain Assessment   Pain Assessment 0-10   Pain Score 8   Pain Type Chronic pain   Pain Location Back   Pain Orientation Lower   Pain Onset Ongoing   Clinical Progression Not changed   Patient's Stated Pain Goal No pain   Hospital Pain Intervention(s) Repositioned   Response to Interventions tolerated mobility   Home Living   Type of [address] Two level  (1 VINI)   Home Equipment Cane   Additional Comments Patient currently lives with his son in a 2 story house with 1 VINI  Patient has a 1st floor set up  PTA, patient was ambulating with a cane  Prior Function   Level of [place] Independent with ADLs and functional mobility   Lives With Son  (grandson)   Receives Help From Family   ADL Assistance Independent   IADLs Independent   Falls in the last 6 months 1 to 4   Vocational Retired   Comments Patient states he is independent in all ADL's and IADL's  Patient is still currently driving and states he goes out into the community for grocery shopping  Son works at night but is home during the day and grandson lives at home and can help if needed  Restrictions/Precautions   Weight Bearing Precautions Per Order No   Other Precautions Pain; Fall Risk;O2   General   Additional Pertinent History 3L O2 NC  (PTA, uses home O2)   Family/Caregiver Present No   Cognition   Overall Cognitive Status WFL   Arousal/Participation Cooperative   Orientation Level Oriented X4   Memory Unable to assess   Following Commands Follows one step commands without difficulty   Comments Initially hesitatant to participate because patient was eating lunch  After explaination that session was to assess for discharge, patient agreeable to therapy       RLE Assessment   RLE Assessment (strength tested 5/5)   LLE Assessment   LLE Assessment (strength tested 5/5)   Coordination   Movements are Fluid and Coordinated 1   Sensation [place]   Light Touch RLE Light Touch Grossly intact   LLE Light Touch Grossly intact   Transfers   Sit to Stand 5  Supervision   Additional items Assist x 1   Stand to Sit 5  Supervision   Additional items Assist x 1   Additional Comments Patient transferred from sit to stand with use of cane and S for safety  Ambulation/Elevation   Gait pattern WNL  (no gait devations noted)   Gait Assistance 5  Supervision   Additional items Assist x 1   Assistive Device Straight cane   Distance 30'x2 w/ cane and O2   Stair Management Assistance 5  Supervision   Additional items Assist x 1   Stair Management Technique One rail R   Number of Stairs 1   Balance   Static Sitting Good   Dynamic Sitting Fair +   Static Standing Fair +   Dynamic Standing Fair   Ambulatory Fair   Endurance Deficit   Endurance Deficit Yes   Endurance Deficit Description due to SOB, pain and fatigue    Activity Tolerance   Activity Tolerance Patient tolerated treatment well;Treatment limited secondary to medical complications (Comment); Patient limited by pain   Nurse Made Aware Yes, Meme   Assessment   Prognosis Good   Assessment Patient seen today for a high complexity physical therapy evaluation  Patient is a 81year old male with pmh including COPD, diabetes, HTN , HLD, CHF and morbid obesity presenting to \[facility]\"" on 6/19/18 with dizziness and SOB secondary to a fall at home  At time of admission, patient denied hitting his head but complained of neck pain  CT/MRI both negative and patient dx with hypovolemic shock  Patient lives with his son and grandson in a 2 story home with 1 [facility]  Patient has a first floor set up and was independent in all ADL's and IADL's Patient ambulates with a cane and is still currently driving in the community  Physical therapy was consulted to assess mobility and discharge recommendation  Upon evaluation, patient was eating lunch and hesitant to be seen   After further explanation of therapy session for discharge purposes, patient was agreeable to therapy  Patient complained of 8/10 back pain which he states is chronic  Sit to stand transfer was performed with cane on R side and S  Patient ambulated 27' x2 with cane and S and performed 1 step with R rail and S  Supervision was used throughout the evaluation for safety purposes  After activity, patient presented with mild increase in breathing and fatigue  SPO2 was taken and recorded at 91%  Per patient report, he is back to baseline function  Based on evaluation, patient does not present with skilled physical therapy needs  Discharge at this time  Recommend home with family supervision at discharge and continued use of SPC  Recommendation   Recommendation D/C PT;Home with family support   Equipment Recommended Cane   PT - OK to Discharge Yes  (to home with family supervision when stable )   Additional Comments Patient was left sitting EOB with O2 connected and call bell in reach      Modified [place] Scale   Modified [place] Scale 2   Barthel Index   Feeding 10   Bathing 5   Grooming Score 5   Dressing Score 5   Bladder Score 10   Bowels Score 10   Toilet Use Score 10   Transfers (Bed/Chair) Score 15   Mobility (Level Surface) Score 0   Stairs Score 5   Barthel Index Score 75     Faiza Stuart, SPT

## 2018-06-24 LAB — BACTERIA BLD CULT: NORMAL

## 2018-06-25 LAB
BACTERIA BLD CULT: NORMAL
BACTERIA BLD CULT: NORMAL

## 2018-06-28 ENCOUNTER — TELEPHONE (OUTPATIENT)
Dept: UROLOGY | Facility: AMBULATORY SURGERY CENTER | Age: 73
End: 2018-06-28

## 2018-06-28 ENCOUNTER — LAB REQUISITION (OUTPATIENT)
Dept: LAB | Facility: HOSPITAL | Age: 73
End: 2018-06-28
Payer: COMMERCIAL

## 2018-06-28 ENCOUNTER — OFFICE VISIT (OUTPATIENT)
Dept: UROLOGY | Facility: AMBULATORY SURGERY CENTER | Age: 73
End: 2018-06-28
Payer: COMMERCIAL

## 2018-06-28 VITALS — DIASTOLIC BLOOD PRESSURE: 82 MMHG | SYSTOLIC BLOOD PRESSURE: 120 MMHG

## 2018-06-28 DIAGNOSIS — C61 MALIGNANT NEOPLASM OF PROSTATE (HCC): ICD-10-CM

## 2018-06-28 DIAGNOSIS — C61 PROSTATE CANCER (HCC): ICD-10-CM

## 2018-06-28 DIAGNOSIS — I50.33 ACUTE ON CHRONIC DIASTOLIC CONGESTIVE HEART FAILURE (HCC): ICD-10-CM

## 2018-06-28 DIAGNOSIS — N40.1 BENIGN PROSTATIC HYPERPLASIA WITH WEAK URINARY STREAM: Primary | ICD-10-CM

## 2018-06-28 DIAGNOSIS — R39.12 BENIGN PROSTATIC HYPERPLASIA WITH WEAK URINARY STREAM: Primary | ICD-10-CM

## 2018-06-28 LAB
ANION GAP SERPL CALCULATED.3IONS-SCNC: 10 MMOL/L (ref 4–13)
BUN SERPL-MCNC: 29 MG/DL (ref 5–25)
CALCIUM SERPL-MCNC: 8.2 MG/DL (ref 8.3–10.1)
CHLORIDE SERPL-SCNC: 97 MMOL/L (ref 100–108)
CO2 SERPL-SCNC: 34 MMOL/L (ref 21–32)
CREAT SERPL-MCNC: 1.5 MG/DL (ref 0.6–1.3)
GFR SERPL CREATININE-BSD FRML MDRD: 46 ML/MIN/1.73SQ M
GLUCOSE SERPL-MCNC: 8 MG/DL (ref 65–140)
POTASSIUM SERPL-SCNC: 4.3 MMOL/L (ref 3.5–5.3)
PSA SERPL-MCNC: <0.1 NG/ML (ref 0–4)
SODIUM SERPL-SCNC: 141 MMOL/L (ref 136–145)

## 2018-06-28 PROCEDURE — 99213 OFFICE O/P EST LOW 20 MIN: CPT | Performed by: UROLOGY

## 2018-06-28 PROCEDURE — 80048 BASIC METABOLIC PNL TOTAL CA: CPT | Performed by: UROLOGY

## 2018-06-28 PROCEDURE — 84153 ASSAY OF PSA TOTAL: CPT | Performed by: UROLOGY

## 2018-06-28 RX ORDER — LEVALBUTEROL INHALATION SOLUTION 1.25 MG/3ML
SOLUTION RESPIRATORY (INHALATION)
Refills: 5 | COMMUNITY
Start: 2018-04-04 | End: 2022-01-30 | Stop reason: HOSPADM

## 2018-06-28 NOTE — TELEPHONE ENCOUNTER
Received a message from the lab with a critical glucose reading of 8  Patient was seen in the office today  I phoned patient to see how he was doing and he stating that he was doing well  I inquired if he had a glucometer and told him why  He checked his sugar and and it is 56  He will going to eat and keep tabs on his sugar  I advised him to get this checked if unable to get this elevated  Again, patient stated that he is feeling fine

## 2018-06-28 NOTE — PROGRESS NOTES
6/28/2018    Arsh Pfeiffer    1945  [id number]        Assessment  History of prostate cancer status post brachytherapy seed implantation, BPH on tamsulosin, COPD      Discussion  His last PSA level from August 2017 is undetectable less than 0 1  I will repeat a PSA in August of 2018  Follow-up will be in August of 2019 with his next PSA level  In the interim I recommend continuing the tamsulosin as the patient feels that this has significantly improved his urinary stream   His most recent creatinine is normal at 1 1  History of Present Illness  81 y o  male with a history of prostate cancer  He is previously known to Dr Noreen Lara  He underwent brachytherapy seed implantation followed by external beam boost radiation  His last PSA from August 2017 is undetectable less than 0 1  He was recently hospitalized and evaluated by Urology for lower urinary tract symptoms at that time  He was started on tamsulosin and states that his urinary stream is improved and that he feels that he is emptying to completion  Unfortunately his COPD appears to have progressed  He is in a wheelchair in the office today  He is oxygen dependent  AUA Symptom Score      Review of Systems  Review of Systems   Constitutional: Negative  HENT: Negative  Eyes: Negative  Respiratory: Negative  Cardiovascular: Negative  Gastrointestinal: Negative  Endocrine: Negative  Genitourinary:        Weak stream   Musculoskeletal: Negative  Skin: Negative  Allergic/Immunologic: Negative  Neurological: Negative  Hematological: Negative  Psychiatric/Behavioral: Negative            Past Medical History  Past Medical History:   Diagnosis Date    Abdominal pain     Cardiac disease     CHF (congestive heart failure) (HCC)     COPD (chronic obstructive pulmonary disease) (HCC)     Coronary artery disease     Diabetes mellitus (HCC)     Hyperlipidemia     Hypertension     MI (myocardial Called patient, patient informed message below via voicemail, Advise to call with any questions.      infarction) [facility])     with 3 stents    Prostate cancer [facility])        Past Social History  Past Surgical History:   Procedure Laterality Date    ABDOMINAL SURGERY      exploratory    ANGIOPLASTY      3 stents    ESOPHAGOGASTRODUODENOSCOPY N/A 10/2/2017    Procedure: ESOPHAGOGASTRODUODENOSCOPY (EGD); Surgeon: Abdulkadir Amato MD;  Location:  GI LAB; Service: Gastroenterology    PROSTATE SURGERY         Past Family History  Family History   Problem Relation Age of Onset    Heart disease Father     Other Father         Mesothelioma        Past Social history  Social History     Social History    Marital status: /Civil Union     Spouse name: N/A    Number of children: N/A    Years of education: N/A     Occupational History    Not on file       Social History Main Topics    Smoking status: Former Smoker     Packs/day: 1 50     Years: 50 00     Quit date: 9/13/2017    Smokeless tobacco: Never Used    Alcohol use No    Drug use: No    Sexual activity: No     Other Topics Concern    Not on file     Social History Narrative    No narrative on file       Current Medications  Current Outpatient Prescriptions   Medication Sig Dispense Refill    aspirin (ECOTRIN LOW STRENGTH) 81 mg EC tablet Take 1 tablet by mouth daily      budesonide-formoterol (SYMBICORT) 160-4 5 mcg/act inhaler Inhale 2 puffs 2 (two) times a day      ferrous sulfate 325 (65 Fe) mg tablet Take 325 mg by mouth daily with breakfast      fluticasone (FLONASE) 50 mcg/act nasal spray 1 spray into each nostril daily 16 g 0    furosemide (LASIX) 40 mg tablet Take 1 tablet (40 mg total) by mouth 2 (two) times a day 60 tablet 0    insulin regular (HumuLIN R U-500) 500 units/mL CONCENTRATED injection Inject 0 09 mL (45 Units total) under the skin 2 (two) times a day before breakfast and lunch 10 mL 0    insulin regular (HumuLIN R U-500) 500 units/mL CONCENTRATED injection Inject 0 05 mL (25 Units total) under the skin daily before dinner 10 mL 0    Insulin Syringe/Needle U-500 (BD INSULIN SYRINGE U-500) 31G X 6MM 0 5 ML MISC by Does not apply route      lisinopril (ZESTRIL) 10 mg tablet Take 10 mg by mouth daily Patient unsure of dose of lisinopril   metoprolol tartrate (LOPRESSOR) 25 mg tablet Take 1 tablet by mouth 2 (two) times a day      Omega-3 Fatty Acids (FISH OIL) 645 MG CAPS Take 1 capsule by mouth 2 (two) times a day      oxyCODONE-acetaminophen (PERCOCET) 7 5-325 MG per tablet Take by mouth every 4 (four) hours      pantoprazole (PROTONIX) 40 mg tablet Take 1 tablet by mouth 2 (two) times a day before meals 60 tablet 0    potassium chloride (K-DUR,KLOR-CON) 20 mEq tablet Take 1 tablet (20 mEq total) by mouth 2 (two) times a day 60 tablet 0    simvastatin (ZOCOR) 40 mg tablet Take 40 mg by mouth daily  5    tamsulosin (FLOMAX) 0 4 mg Take 1 capsule (0 4 mg total) by mouth daily with dinner 30 capsule 0    tiotropium (SPIRIVA) 18 mcg inhalation capsule Place 18 mcg into inhaler and inhale daily      levalbuterol (XOPENEX) 1 25 mg/3 mL nebulizer solution USE 1 VIAL IN NEBULIZER THREE TIMES A DAY  5     No current facility-administered medications for this visit  Allergies  Allergies   Allergen Reactions    Metformin GI Intolerance       Past Medical History, Social History, Family History, medications and allergies were reviewed  Vitals  Vitals:    06/28/18 0932   BP: 120/82   BP Location: Left arm   Patient Position: Sitting       Physical Exam  Physical Exam  On examination he is elderly appearing  He is in a wheelchair  He is oxygen dependent  He is ill-appearing his abdomen is soft obese  There is point tenderness in the right mid abdomen (a recent CT scan from June 19, 2018 shows no evidence of intra-abdominal pathology)  He cannot stand for digital rectal examination  Affect flat      Results  Lab Results   Component Value Date    PSA <0 1 08/08/2017    PSA <0 1 12/08/2016     Lab Results   Component Value Date    GLUCOSE 179 (H) 06/22/2018    CALCIUM 8 5 06/22/2018     06/22/2018    K 4 5 06/22/2018    CO2 35 (H) 06/22/2018    CL 98 (L) 06/22/2018    BUN 17 06/22/2018    CREATININE 1 19 06/22/2018     Lab Results   Component Value Date    WBC 6 91 06/22/2018    HGB 9 7 (L) 06/22/2018    HCT 31 1 (L) 06/22/2018    MCV 90 06/22/2018     (L) 06/22/2018         Office Urine Dip  No results found for this or any previous visit (from the past 1 hour(s)) ]

## 2018-07-03 NOTE — ED ATTENDING ATTESTATION
Serafin Vásquez MD, saw and evaluated the patient  I have discussed the patient with the resident/non-physician practitioner and agree with the resident's/non-physician practitioner's findings, Plan of Care, and MDM as documented in the resident's/non-physician practitioner's note, except where noted  All available labs and Radiology studies were reviewed  At this point I agree with the current assessment done in the Emergency Department  I have conducted an independent evaluation of this patient a history and physical is as follows:    Patient presents that is for evaluation of a fall at home  He states that he got up to go the bathroom and felt lightheaded which caused him to fall  On EMS arrival, patient was found to have an Accu-Chek less than 50  He was given oral glucose and brought into the emergency department  On arrival to the emergency department, patient still remained hypoglycemic and was given a 5 g of dextrose  Patient denies taking any increase in his insulin  He states that he did not use much yesterday because he was not hungry  Denies any vomiting  Patient complaining of neck pain  No additional complaints  Exam:  Sleepy but arousable, oriented, NAD, RRR, CTA, S/NT/ND, no motor/sensory deficits, dry mucous membranes, ecchymosis and abrasion over left forearm, midline cervical tenderness  A/P:  Hypoglycemia, neck pain  Will check labs, CT head/C-spine, and monitor glucose  While in the emergency room, patient became hypotensive and had decreased mental status  Patient given IV fluids and started Levophed peripherally  While we were considering intubating patient, he became more awake and interactive  Blood pressure improved and then patient became hypertensive  Levophed weaned off  Following this, patient again became hypotensive and Levophed need to be restarted  Unclear etiology of hypotension at this time  Will CT chest, abdomen, and pelvis to evaluate for cause  Will continue Levophed peripherally as I do not feel the patient would tolerate central line at this time and he declines BiPAP  Critical Care Time  The patient presented with a condition in which there was a high probability of imminent or life-threatening deterioration, and critical care services (excluding separately billable procedures) totalled 30-74 minutes (47)          Procedures

## 2018-07-10 RX ORDER — SYRINGE,INSUL U-500,NDL,0.5ML 31GX15/64"
SYRINGE, EMPTY DISPOSABLE MISCELLANEOUS
Qty: 100 EACH | Refills: 5 | OUTPATIENT
Start: 2018-07-10

## 2018-07-10 NOTE — TELEPHONE ENCOUNTER
Please if this can be denied, I tried to call this pt because he has not been seen in about a year, I wanted him to make an appt  I attempted to call him on both #'s listed and they are not working

## 2018-07-12 DIAGNOSIS — I50.33 ACUTE ON CHRONIC DIASTOLIC (CONGESTIVE) HEART FAILURE (HCC): ICD-10-CM

## 2018-07-12 RX ORDER — FUROSEMIDE 40 MG/1
40 TABLET ORAL 2 TIMES DAILY
Qty: 60 TABLET | Refills: 4 | Status: SHIPPED | OUTPATIENT
Start: 2018-07-12 | End: 2018-12-06 | Stop reason: SDUPTHER

## 2018-08-28 ENCOUNTER — LAB REQUISITION (OUTPATIENT)
Dept: LAB | Facility: HOSPITAL | Age: 73
End: 2018-08-28
Payer: COMMERCIAL

## 2018-08-28 DIAGNOSIS — I11.0 HYPERTENSIVE HEART DISEASE WITH HEART FAILURE (HCC): ICD-10-CM

## 2018-08-28 DIAGNOSIS — E11.9 TYPE 2 DIABETES MELLITUS WITHOUT COMPLICATIONS (HCC): ICD-10-CM

## 2018-08-28 LAB
ALBUMIN SERPL BCP-MCNC: 3.3 G/DL (ref 3.5–5)
ALP SERPL-CCNC: 72 U/L (ref 46–116)
ALT SERPL W P-5'-P-CCNC: 21 U/L (ref 12–78)
ANION GAP SERPL CALCULATED.3IONS-SCNC: 5 MMOL/L (ref 4–13)
AST SERPL W P-5'-P-CCNC: 17 U/L (ref 5–45)
BASOPHILS # BLD AUTO: 0.02 THOUSANDS/ΜL (ref 0–0.1)
BASOPHILS NFR BLD AUTO: 0 % (ref 0–1)
BILIRUB DIRECT SERPL-MCNC: 0.12 MG/DL (ref 0–0.2)
BILIRUB SERPL-MCNC: 0.4 MG/DL (ref 0.2–1)
BUN SERPL-MCNC: 28 MG/DL (ref 5–25)
CALCIUM SERPL-MCNC: 8.7 MG/DL (ref 8.3–10.1)
CHLORIDE SERPL-SCNC: 96 MMOL/L (ref 100–108)
CHOLEST SERPL-MCNC: 152 MG/DL (ref 50–200)
CO2 SERPL-SCNC: 34 MMOL/L (ref 21–32)
CREAT SERPL-MCNC: 1.48 MG/DL (ref 0.6–1.3)
EOSINOPHIL # BLD AUTO: 0.2 THOUSAND/ΜL (ref 0–0.61)
EOSINOPHIL NFR BLD AUTO: 2 % (ref 0–6)
ERYTHROCYTE [DISTWIDTH] IN BLOOD BY AUTOMATED COUNT: 14 % (ref 11.6–15.1)
EST. AVERAGE GLUCOSE BLD GHB EST-MCNC: 171 MG/DL
GFR SERPL CREATININE-BSD FRML MDRD: 47 ML/MIN/1.73SQ M
GLUCOSE SERPL-MCNC: 247 MG/DL (ref 65–140)
HBA1C MFR BLD: 7.6 % (ref 4.2–6.3)
HCT VFR BLD AUTO: 36.1 % (ref 36.5–49.3)
HDLC SERPL-MCNC: 48 MG/DL (ref 40–60)
HGB BLD-MCNC: 11.2 G/DL (ref 12–17)
IMM GRANULOCYTES # BLD AUTO: 0.06 THOUSAND/UL (ref 0–0.2)
IMM GRANULOCYTES NFR BLD AUTO: 1 % (ref 0–2)
LDLC SERPL CALC-MCNC: 69 MG/DL (ref 0–100)
LYMPHOCYTES # BLD AUTO: 1.43 THOUSANDS/ΜL (ref 0.6–4.47)
LYMPHOCYTES NFR BLD AUTO: 15 % (ref 14–44)
MCH RBC QN AUTO: 28.2 PG (ref 26.8–34.3)
MCHC RBC AUTO-ENTMCNC: 31 G/DL (ref 31.4–37.4)
MCV RBC AUTO: 91 FL (ref 82–98)
MONOCYTES # BLD AUTO: 0.73 THOUSAND/ΜL (ref 0.17–1.22)
MONOCYTES NFR BLD AUTO: 8 % (ref 4–12)
NEUTROPHILS # BLD AUTO: 7.28 THOUSANDS/ΜL (ref 1.85–7.62)
NEUTS SEG NFR BLD AUTO: 74 % (ref 43–75)
NONHDLC SERPL-MCNC: 104 MG/DL
NRBC BLD AUTO-RTO: 0 /100 WBCS
PLATELET # BLD AUTO: 173 THOUSANDS/UL (ref 149–390)
PMV BLD AUTO: 10.5 FL (ref 8.9–12.7)
POTASSIUM SERPL-SCNC: 4.3 MMOL/L (ref 3.5–5.3)
PROT SERPL-MCNC: 7.6 G/DL (ref 6.4–8.2)
RBC # BLD AUTO: 3.97 MILLION/UL (ref 3.88–5.62)
SODIUM SERPL-SCNC: 135 MMOL/L (ref 136–145)
TRIGL SERPL-MCNC: 173 MG/DL
WBC # BLD AUTO: 9.72 THOUSAND/UL (ref 4.31–10.16)

## 2018-08-28 PROCEDURE — 80076 HEPATIC FUNCTION PANEL: CPT | Performed by: FAMILY MEDICINE

## 2018-08-28 PROCEDURE — 83036 HEMOGLOBIN GLYCOSYLATED A1C: CPT | Performed by: FAMILY MEDICINE

## 2018-08-28 PROCEDURE — 80061 LIPID PANEL: CPT | Performed by: FAMILY MEDICINE

## 2018-08-28 PROCEDURE — 80048 BASIC METABOLIC PNL TOTAL CA: CPT | Performed by: FAMILY MEDICINE

## 2018-08-28 PROCEDURE — 85025 COMPLETE CBC W/AUTO DIFF WBC: CPT | Performed by: FAMILY MEDICINE

## 2018-08-30 ENCOUNTER — OFFICE VISIT (OUTPATIENT)
Dept: CARDIOLOGY CLINIC | Facility: CLINIC | Age: 73
End: 2018-08-30
Payer: COMMERCIAL

## 2018-08-30 VITALS
HEART RATE: 84 BPM | BODY MASS INDEX: 35.65 KG/M2 | OXYGEN SATURATION: 90 % | SYSTOLIC BLOOD PRESSURE: 80 MMHG | HEIGHT: 72 IN | WEIGHT: 263.2 LBS | DIASTOLIC BLOOD PRESSURE: 40 MMHG

## 2018-08-30 DIAGNOSIS — J96.22 ACUTE ON CHRONIC RESPIRATORY FAILURE WITH HYPOXIA AND HYPERCAPNIA (HCC): Primary | ICD-10-CM

## 2018-08-30 DIAGNOSIS — J96.21 ACUTE ON CHRONIC RESPIRATORY FAILURE WITH HYPOXIA AND HYPERCAPNIA (HCC): Primary | ICD-10-CM

## 2018-08-30 DIAGNOSIS — I50.32 CHRONIC DIASTOLIC HEART FAILURE (HCC): ICD-10-CM

## 2018-08-30 DIAGNOSIS — I10 ESSENTIAL HYPERTENSION: ICD-10-CM

## 2018-08-30 DIAGNOSIS — I25.10 CORONARY ARTERY DISEASE INVOLVING NATIVE CORONARY ARTERY OF NATIVE HEART WITHOUT ANGINA PECTORIS: ICD-10-CM

## 2018-08-30 PROCEDURE — 99214 OFFICE O/P EST MOD 30 MIN: CPT | Performed by: INTERNAL MEDICINE

## 2018-08-30 NOTE — PROGRESS NOTES
Cardiology   Honorio Shows  81 y o  male MRN: [id number]      Impression:  1  Dyspnea - multifactorial, but does not seem volume overloaded at this time  2  Coronary artery disease - stable  3  Hypertension - now hypotensive  4  Dyslipidemia - on statin      Recommendations:  1  Discontinue lisinopril  2  Continue remainder of medications  3  Follow up in 2 months  HPI: Gilmer Esposito Sr  is a 81y o  year old male with coronary artery disease, COPD, and pulmonary hypertension, recently in the hospital in April, May, and June 2346 with diastolic heart failure  Edema has improved, but still dyspneic with exertion  Review of Systems   Constitutional: Positive for fatigue  HENT: Negative  Eyes: Negative  Respiratory: Positive for shortness of breath  Negative for chest tightness  Cardiovascular: Negative for chest pain, palpitations and leg swelling  Gastrointestinal: Negative  Endocrine: Negative  Genitourinary: Negative  Musculoskeletal: Negative  Skin: Negative  Allergic/Immunologic: Negative  Neurological: Negative  Hematological: Negative  Psychiatric/Behavioral: Negative  All other systems reviewed and are negative  Past Medical History:   Diagnosis Date    Abdominal pain     Cardiac disease     CHF (congestive heart failure) (HCC)     COPD (chronic obstructive pulmonary disease) (HCC)     Coronary artery disease     Diabetes mellitus ([facility] )     Hyperlipidemia     Hypertension     MI (myocardial infarction) ([facility] )     with 3 stents    Prostate cancer ([facility] )      Past Surgical History:   Procedure Laterality Date    ABDOMINAL SURGERY      exploratory    ANGIOPLASTY      3 stents    ESOPHAGOGASTRODUODENOSCOPY N/A 10/2/2017    Procedure: ESOPHAGOGASTRODUODENOSCOPY (EGD); Surgeon: Harriet Chi MD;  Location: [facility];   Service: Gastroenterology    PROSTATE SURGERY       History   Alcohol Use No     History   Drug Use No History   Smoking Status    Former Smoker    Packs/day: 1 50    Years: 50 00    Quit date: 9/13/2017   Smokeless Tobacco    Never Used     Family History   Problem Relation Age of Onset    Heart disease Father     Other Father         Mesothelioma        Allergies:   Allergies   Allergen Reactions    Metformin GI Intolerance       Medications:     Current Outpatient Prescriptions:     aspirin (ECOTRIN LOW STRENGTH) 81 mg EC tablet, Take 1 tablet by mouth daily, Disp: , Rfl:     budesonide-formoterol (SYMBICORT) 160-4 5 mcg/act inhaler, Inhale 2 puffs 2 (two) times a day, Disp: , Rfl:     ferrous sulfate 325 (65 Fe) mg tablet, Take 325 mg by mouth daily with breakfast, Disp: , Rfl:     fluticasone (FLONASE) 50 mcg/act nasal spray, 1 spray into each nostril daily, Disp: 16 g, Rfl: 0    furosemide (LASIX) 40 mg tablet, Take 1 tablet (40 mg total) by mouth 2 (two) times a day, Disp: 60 tablet, Rfl: 4    insulin regular (HumuLIN R U-500) 500 units/mL CONCENTRATED injection, Inject 0 09 mL (45 Units total) under the skin 2 (two) times a day before breakfast and lunch, Disp: 10 mL, Rfl: 0    Insulin Syringe/Needle U-500 (BD INSULIN SYRINGE U-500) 31G X 6MM 0 5 ML MISC, by Does not apply route, Disp: , Rfl:     levalbuterol (XOPENEX) 1 25 mg/3 mL nebulizer solution, USE 1 VIAL IN NEBULIZER THREE TIMES A DAY, Disp: , Rfl: 5    lisinopril (ZESTRIL) 10 mg tablet, Take 10 mg by mouth daily Patient unsure of dose of lisinopril , Disp: , Rfl:     metoprolol tartrate (LOPRESSOR) 25 mg tablet, Take 1 tablet by mouth 2 (two) times a day, Disp: , Rfl:     Omega-3 Fatty Acids (FISH OIL) 645 MG CAPS, Take 1 capsule by mouth 2 (two) times a day, Disp: , Rfl:     oxyCODONE-acetaminophen (PERCOCET) 7 5-325 MG per tablet, Take by mouth every 4 (four) hours, Disp: , Rfl:     pantoprazole (PROTONIX) 40 mg tablet, Take 1 tablet by mouth 2 (two) times a day before meals, Disp: 60 tablet, Rfl: 0    potassium chloride (K-DUR,KLOR-CON) 20 mEq tablet, Take 1 tablet (20 mEq total) by mouth 2 (two) times a day, Disp: 60 tablet, Rfl: 0    simvastatin (ZOCOR) 40 mg tablet, Take 40 mg by mouth daily, Disp: , Rfl: 5    tamsulosin (FLOMAX) 0 4 mg, Take 1 capsule (0 4 mg total) by mouth daily with dinner, Disp: 30 capsule, Rfl: 0    tiotropium (SPIRIVA) 18 mcg inhalation capsule, Place 18 mcg into inhaler and inhale daily, Disp: , Rfl:       Wt Readings from Last 3 Encounters:   08/30/18 119 kg (263 lb 3 2 oz)   06/22/18 130 kg (286 lb 2 5 oz)   06/01/18 124 kg (273 lb 14 4 oz)     Temp Readings from Last 3 Encounters:   06/22/18 97 9 °F (36 6 °C) (Oral)   06/18/18 98 °F (36 7 °C) (Tympanic)   05/26/18 97 6 °F (36 4 °C) (Oral)     BP Readings from Last 3 Encounters:   08/30/18 (!) 80/40   06/28/18 120/82   06/22/18 130/59     Pulse Readings from Last 3 Encounters:   08/30/18 84   06/22/18 84   06/18/18 (!) 115         Physical Exam   Constitutional: He is oriented to person, place, and time  He appears well-developed  HENT:   Head: Atraumatic  Eyes: EOM are normal    Neck: Normal range of motion  Cardiovascular: Normal rate, regular rhythm and normal heart sounds  Exam reveals no gallop and no friction rub  No murmur heard  Pulmonary/Chest: Effort normal and breath sounds normal  No respiratory distress  He has no wheezes  He has no rales  Abdominal: Soft  Musculoskeletal: Normal range of motion  Neurological: He is alert and oriented to person, place, and time  Skin: Skin is warm and dry  Psychiatric: He has a normal mood and affect           Laboratory Studies:  CMP:  Lab Results   Component Value Date     (L) 08/28/2018    K 4 3 08/28/2018    CL 96 (L) 08/28/2018    CO2 34 (H) 08/28/2018    ANIONGAP 4 09/09/2015    BUN 28 (H) 08/28/2018    CREATININE 1 48 (H) 08/28/2018    GLUCOSE 107 06/19/2018    AST 17 08/28/2018    ALT 21 08/28/2018    BILITOT 0 27 09/08/2015    EGFR 47 08/28/2018       Lipid Profile: Lab Results   Component Value Date    CHOL 152 09/10/2015     Lab Results   Component Value Date    HDL 48 2018     Lab Results   Component Value Date    LDLCALC 69 2018     Lab Results   Component Value Date    TRIG 173 (H) 2018       Cardiac testing:     Results for orders placed during the hospital encounter of 18   Echo complete with contrast if indicated    Narrative [address]  [address], [address] [facility]  [phone]    Transthoracic Echocardiogram  2D, M-mode, Doppler, and Color Doppler    Study date:  25-May-2018    Patient: Sandy Perez  MR number: [id number]  Account number: [de-identified]  : 1945  Age: 81 years  Gender: Male  Status: Inpatient  Location: Bedside  Height: 72 in  Weight: 278 5 lb  BP: 131/ 65 mmHg    Indications: Shortness of Breath    Diagnoses: R06 02 - Shortness of breath    Sonographer:  Dorene David RDCS  Primary Physician:  Manoj Lagunas MD  Referring Physician:  Radha Lora DO  Group:  [id number]  Interpreting Physician:  Peggy Hutton MD    SUMMARY    SUMMARY:  This was a technically difficult study  Echocardiographic views were limited due to decreased ultrasound penetration, and lung interference  The left ventricular size and function was normal with an estimated ejection fraction of 65%  There was moderate concentricc left ventricular hypertrophy  Although no obvious regional left ventricular wall motion abnormality was noted,  segmental wall motion could not be assessed adequately  The right ventricular size and function appeared normal in limited views  This study was not optimal for evaluation of cardiac valves  There was no pericardial effusion  HISTORY: PRIOR HISTORY: HLD, COPD, CKD  HTN, IDDM, CHF, NSTEMI, CAD    PROCEDURE: The procedure was performed at the bedside  This was a routine study  The transthoracic approach was used   The study included complete 2D imaging, M-mode, complete spectral Doppler, and color Doppler  The heart rate was 80 bpm,  at the start of the study  Images were obtained from the parasternal, apical, subcostal, and suprasternal notch acoustic windows  Echocardiographic views were limited due to decreased penetration and lung interference  This was a  technically difficult study  SYSTEM MEASUREMENT TABLES    2D mode  Aortic Root Diameter; User chosen value; 2D mode;: 3 7 cm  LA/Ao (2D): 0 92  Left Atrium Vega-posterior Systolic Dimension; User chosen value; 2D mode;: 3 4 cm  EDV (2D-Cubed): 72 5 cm3  EF (2D-Cubed): 57 7 %  ESV (2D-Cubed): 30 7 cm3  FS (2D-Cubed): 24 9 %  FS (2D-Teich): 24 9 %  IVS/LVPW (2D): 1 11  Interventricular Septum Diastolic Thickness; User chosen value; 2D mode;: 1 33 cm  Left Ventricle Internal End Diastolic Dimension; User chosen value; 2D mode;: 4 17 cm  Left Ventricle Internal Systolic Dimension; User chosen value; 2D mode;: 3 13 cm  Left Ventricle Posterior Wall Diastolic Thickness; User chosen value; 2D mode;: 1 2 cm  Left Ventricular Ejection Fraction; Teichholz; 2D mode;: 49 8 %  Left Ventricular End Diastolic Volume; Teichholz; 2D mode;: 77 3 cm3  Left Ventricular End Systolic Volume; Teichholz; 2D mode;: 38 8 cm3  SI (2D-Cubed): 17 1 ml/m2  SV (2D-Cubed): 41 8 cm3  Stroke Index; Teichholz; 2D mode;: 15 7 ml/m2  Stroke Volume; Teichholz; 2D mode;: 38 5 cm3    Apical four chamber  LV MOD Diam; Recent value; End Diastole (A4C): 2 3 cm  LV MOD Diam; Recent value; End Diastole (A4C): 4 72 cm  LV MOD Diam; Recent value; End Diastole (A4C): 4 97 cm  LV MOD Diam; Recent value; End Diastole (A4C): 5 13 cm  LV MOD Diam; Recent value; End Diastole (A4C): 5 09 cm  LV MOD Diam; Recent value; End Diastole (A4C): 4 93 cm  LV MOD Diam; Recent value; End Diastole (A4C): 1 9 cm  LV MOD Diam; Recent value; End Diastole (A4C): 2 67 cm  LV MOD Diam; Recent value; End Diastole (A4C): 3 19 cm  LV MOD Diam; Recent value;  End Diastole (A4C): 3 47 cm  LV MOD Diam; Recent value; End Diastole (A4C): 3 84 cm  LV MOD Diam; Recent value; End Diastole (A4C): 4 08 cm  LV MOD Diam; Recent value; End Diastole (A4C): 4 36 cm  LV MOD Diam; Recent value; End Diastole (A4C): 4 56 cm  LV MOD Diam; Recent value; End Diastole (A4C): 4 53 cm  LV MOD Diam; Recent value; End Diastole (A4C): 4 68 cm  LV MOD Diam; Recent value; End Diastole (A4C): 4 8 cm  LV MOD Diam; Recent value; End Diastole (A4C): 5 05 cm  LV MOD Diam; Recent value; End Diastole (A4C): 5 13 cm  LV MOD Diam; Recent value; End Diastole (A4C): 5 09 cm  LV MOD Diam; Recent value; End Systole (A4C): 1 6 cm  LV MOD Diam; Recent value; End Systole (A4C): 3 28 cm  LV MOD Diam; Recent value; End Systole (A4C): 3 24 cm  LV MOD Diam; Recent value; End Systole (A4C): 3 12 cm  LV MOD Diam; Recent value; End Systole (A4C): 2 88 cm  LV MOD Diam; Recent value; End Systole (A4C): 1 92 cm  LV MOD Diam; Recent value; End Systole (A4C): 2 16 cm  LV MOD Diam; Recent value; End Systole (A4C): 2 32 cm  LV MOD Diam; Recent value; End Systole (A4C): 2 48 cm  LV MOD Diam; Recent value; End Systole (A4C): 2 6 cm  LV MOD Diam; Recent value; End Systole (A4C): 2 64 cm  LV MOD Diam; Recent value; End Systole (A4C): 2 72 cm  LV MOD Diam; Recent value; End Systole (A4C): 2 76 cm  LV MOD Diam; Recent value; End Systole (A4C): 2 84 cm  LV MOD Diam; Recent value; End Systole (A4C): 0 84 cm  LV MOD Diam; Recent value; End Systole (A4C): 1 32 cm  LV MOD Diam; Recent value; End Systole (A4C): 1 6 cm  LV MOD Diam; Recent value; End Systole (A4C): 3 cm  LV MOD Diam; Recent value; End Systole (A4C): 3 2 cm  LV MOD Diam; Recent value; End Systole (A4C): 3 28 cm  LVEF MOD A4C: 70 %  Left Ventricle diastolic major axis; Most recent value chosen; Method of Disks, Single Plane; 2D mode; Apical four chamber;: 8 52 cm  Left Ventricle systolic major axis; Most recent value chosen; Method of Disks, Single Plane; 2D mode;  Apical four chamber;: 7 21 cm  Left Ventricular Diastolic Area; Most recent value chosen; Method of Disks, Single Plane; 2D mode; Apical four chamber;: 3610 mm2  Left Ventricular End Diastolic Volume; Most recent value chosen; Method of Disks, Single Plane; 2D mode; Apical four chamber;: 126 cm3  Left Ventricular End Systolic Volume; Most recent value chosen; Method of Disks, Single Plane; 2D mode; Apical four chamber;: 38 cm3  Left Ventricular Systolic Area; Most recent value chosen; Method of Disks, Single Plane; 2D mode; Apical four chamber;: 1800 mm2  SI (A4C): 35 9 ml/m2  SV MOD A4C: 88 cm3    Tissue Doppler Imaging  LV Peak Early Scott Tissue Jose; Medial MA (TDI): 60 3 mm/s  Left Ventricular Peak Early Diastolic Tissue Velocity; Mean; Mean value chosen; Medial Mitral Annulus; Tissue Doppler Imaging;: 60 3 mm/s    Unspecified Scan Mode  MV Peak Jose/LV Peak Tissue Jose E-Wave; Medial MA: 17 4  DT; Antegrade Flow: 195 ms  DT; Mean; Antegrade Flow: 195 ms  MV A Jose: 1140 mm/s  MV E Jose: 1050 mm/s  MV E/A Ratio: 0 9  MV Peak A Jose: 1140 mm/s  MV Peak E Jose; Mean; Antegrade Flow: 1050 mm/s    [employer] Accredited Echocardiography Laboratory    Prepared and electronically signed by    America Artis MD  Signed [id number] 08:20:16       No results found for this or any previous visit    Results for orders placed in visit on 09/09/15   Cardiac catheterization    [address]   Phone: [phone] LAB COMPLETE REPORT         Patient: Aura Malloy   Location: [facility] Room: [facility]   MR number: [id number]   Account number: [de-identified]   Study date: 09/10/2015   Gender: Male   : 1945   Height: 72 in   Weight: 241 8 lb   BSA: 2 31 m squared   Allergies: NKA   Diagnostic Cardiologist:  Polly Cintron MD   Primary Physician:  Makenzie Rodas MD   SUMMARY   CORONARY CIRCULATION:   Left main: Normal    1st diagonal: There was a discrete 50 % stenosis at the ostium of the vessel   segment  CARDIAC STRUCTURES:   Analysis of regional contractile function demonstrated mild diaphragmatic   hypokinesis  base only Global left ventricular function was normal  EF   calculated by contrast ventriculography was 60 %  INDICATIONS:   --  Possible CAD: unstable angina, CCS class IV  PROCEDURES PERFORMED   --  Left heart catheterization with ventriculography  --  Left coronary angiography  --  Right coronary angiography  --  Coronary Catheterization (w/ LHC)  PROCEDURE: The risks and alternatives of the procedures and conscious sedation   were explained to the patient and informed consent was obtained  The patient   was brought to the cath lab and placed on the table  The planned puncture    sites   were prepped and draped in the usual sterile fashion  --  Right femoral artery access  The puncture site was infiltrated with local   INVASIVE CARDIOVASCULAR LAB COMPLETE REPORT   Patient: Alycia Terrazas   MR number: [id number]    ------ Page 2   BSA: 2 31 m squared   anesthetic  The vessel was accessed using the modified Seldinger technique, a   wire was advanced into the vessel, and a sheath was advanced over the wire    into   the vessel  --  Left heart catheterization with ventriculography  A catheter was advanced   over a guidewire into the ascending aorta  After recording ascending aortic   pressure, the catheter was advanced across the aortic valve and left   ventricular pressure was recorded  Ventriculography was performed  The    catheter   was pulled back across the aortic valve and into the ascending aorta and   pullback pressures were obtained  --  Left coronary artery angiography  A catheter was advanced over a guidewire   into the aorta and positioned in the left coronary artery ostium under   fluoroscopic guidance  Angiography was performed  --  Right coronary artery angiography   A catheter was advanced over a guidewire   into the aorta and positioned in the right coronary artery ostium under   fluoroscopic guidance  Angiography was performed  --  Coronary Catheterization (w/ [facility])  PROCEDURE COMPLETION: The patient tolerated the procedure well and was   discharged from the cath lab  TIMING: Test started at 11:39  Test concluded at   12:19  HEMOSTASIS: The sheath was removed  The site was compressed manually  Hemostasis was obtained  MEDICATIONS GIVEN: Fentanyl (1OOmcg/2 ml), 50 mcg,    IV,   at 11:45  Versed (2mg/2ml), 1 mg, IV, at 11:45  1% Lidocaine, 9 ml,   subcutaneously, at 11:47  Fentanyl (1OOmcg/2 ml), 50 mcg, IV, at 11:57  Versed   (2mg/2ml), 1 mg, IV, at 11:57  CONTRAST GIVEN: 72 ml Omnipaque (350mg I /ml)  75 ml Omnipaque (350mg I /ml)  RADIATION EXPOSURE: Fluoroscopy time: 3 9 min  HEMODYNAMICS: Hemodynamic assessment demonstrated normal LVEDP  VENTRICLES:   --  Analysis of regional contractile function demonstrated mild   diaphragmatic hypokinesis  base only Global left ventricular function was   normal  EF calculated by contrast ventriculography was 60 %  VALVES:   AORTIC VALVE:   --  There was no aortic stenosis  MITRAL VALVE:   --  The mitral valve exhibited trivial regurgitation (less    than   1+)  CORONARY VESSELS:   --  The coronary circulation is right dominant  --  Left main: Normal    --  LAD: The vessel was medium to large sized and mildly calcified  Angiography   showed the vessel to wrap around the cardiac apex and mild atherosclerosis  There were three major diagonal branches  --  Proximal LAD: There was a 30 % stenosis at the origin of D1    --  1st diagonal: The vessel was medium sized  There was a discrete 50 %   stenosis at the ostium of the vessel segment  In a second lesion, there was a    0   % stenosis at the site of a prior stent   --  Circumflex: The vessel was small   INVASIVE CARDIOVASCULAR LAB COMPLETE REPORT   Patient: Juan Carlos Rodríguez   MR number: [id number]    ------ Page 3   BSA: 2 31 m squared   to medium sized  Angiography showed mild atherosclerosis  There was one major   obtuse marginal    --  RCA: The vessel was medium sized  Angiography showed mild atherosclerosis  --  Proximal RCA: There was a discrete 30 % stenosis  --  Mid RCA: There was a 0 % stenosis at the site of a prior stent  --  Distal RCA: There was a 0 % stenosis at the site of a prior stent  AORTA:   --  The root exhibited normal size  --  Ascending aorta: Normal    Prepared and signed by   Ryan Mahmood MD   Signed 09/10/2015 12:46:40   Study diagram   Angiographic findings   Native coronary lesions:   ï¿½Proximal LAD: Lesion 1: 30 % stenosis  ï¿½D1: Lesion 1: discrete, 50 % stenosis  Lesion 2: 0 % stenosis, site of prior   stent  ï¿½Proximal RCA: Lesion 1: discrete, 30 % stenosis  ï¿½Mid RCA: Lesion 1: 0 % stenosis, site of prior stent  ï¿½Distal RCA: Lesion 1: 0 % stenosis, site of prior stent     Hemodynamic tables   Pressures:  Baseline   Pressures:  - HR: 82   Pressures:  - Rhythm:   Pressures:  -- Aortic Pressure (S/D/M): 143/62/88   Pressures:  -- Left Ventricle (s/edp): 143/23/--   Outputs:  Baseline   Outputs:  -- CALCULATIONS[age]   Outputs:  -- CALCULATIONS: Body Surface Area: 2 31   Outputs:  -- CALCULATIONS: Height in cm: 183 00   Outputs:  -- CALCULATIONS: Sex: Male   Outputs:  -- CALCULATIONS: Weight in k 90

## 2018-08-30 NOTE — PATIENT INSTRUCTIONS
Recommendations:  1  Discontinue lisinopril  2  Continue remainder of medications  3  Follow up in 2 months

## 2018-09-11 ENCOUNTER — HOSPITAL ENCOUNTER (OUTPATIENT)
Facility: HOSPITAL | Age: 73
Setting detail: OBSERVATION
Discharge: HOME HEALTH | End: 2018-09-12
Attending: EMERGENCY MEDICINE | Admitting: HOSPITALIST
Payer: COMMERCIAL

## 2018-09-11 ENCOUNTER — APPOINTMENT (EMERGENCY)
Dept: RADIOLOGY | Facility: HOSPITAL | Age: 73
End: 2018-09-11
Payer: COMMERCIAL

## 2018-09-11 DIAGNOSIS — J44.9 COPD (CHRONIC OBSTRUCTIVE PULMONARY DISEASE) (HCC): Chronic | ICD-10-CM

## 2018-09-11 DIAGNOSIS — R09.02 HYPOXIA: Primary | ICD-10-CM

## 2018-09-11 DIAGNOSIS — R06.02 SHORTNESS OF BREATH: ICD-10-CM

## 2018-09-11 PROBLEM — R73.9 ELEVATED BLOOD SUGAR: Status: ACTIVE | Noted: 2018-09-11

## 2018-09-11 LAB
ANION GAP SERPL CALCULATED.3IONS-SCNC: 6 MMOL/L (ref 4–13)
BACTERIA UR QL AUTO: ABNORMAL /HPF
BASOPHILS # BLD AUTO: 0.02 THOUSANDS/ΜL (ref 0–0.1)
BASOPHILS NFR BLD AUTO: 0 % (ref 0–1)
BILIRUB UR QL STRIP: NEGATIVE
BUN SERPL-MCNC: 14 MG/DL (ref 5–25)
CALCIUM SERPL-MCNC: 9.4 MG/DL (ref 8.3–10.1)
CHLORIDE SERPL-SCNC: 97 MMOL/L (ref 100–108)
CLARITY UR: CLEAR
CO2 SERPL-SCNC: 31 MMOL/L (ref 21–32)
COLOR UR: YELLOW
COLOR, POC: NORMAL
CREAT SERPL-MCNC: 1.3 MG/DL (ref 0.6–1.3)
EOSINOPHIL # BLD AUTO: 0.12 THOUSAND/ΜL (ref 0–0.61)
EOSINOPHIL NFR BLD AUTO: 1 % (ref 0–6)
ERYTHROCYTE [DISTWIDTH] IN BLOOD BY AUTOMATED COUNT: 13.7 % (ref 11.6–15.1)
GFR SERPL CREATININE-BSD FRML MDRD: 55 ML/MIN/1.73SQ M
GLUCOSE SERPL-MCNC: 135 MG/DL (ref 65–140)
GLUCOSE SERPL-MCNC: 270 MG/DL (ref 65–140)
GLUCOSE SERPL-MCNC: 317 MG/DL (ref 65–140)
GLUCOSE SERPL-MCNC: 74 MG/DL (ref 65–140)
GLUCOSE UR STRIP-MCNC: ABNORMAL MG/DL
HCT VFR BLD AUTO: 36.1 % (ref 36.5–49.3)
HGB BLD-MCNC: 11.8 G/DL (ref 12–17)
HGB UR QL STRIP.AUTO: ABNORMAL
HYALINE CASTS #/AREA URNS LPF: ABNORMAL /LPF
IMM GRANULOCYTES # BLD AUTO: 0.04 THOUSAND/UL (ref 0–0.2)
IMM GRANULOCYTES NFR BLD AUTO: 0 % (ref 0–2)
KETONES UR STRIP-MCNC: NEGATIVE MG/DL
LEUKOCYTE ESTERASE UR QL STRIP: NEGATIVE
LYMPHOCYTES # BLD AUTO: 1.16 THOUSANDS/ΜL (ref 0.6–4.47)
LYMPHOCYTES NFR BLD AUTO: 11 % (ref 14–44)
MCH RBC QN AUTO: 28.9 PG (ref 26.8–34.3)
MCHC RBC AUTO-ENTMCNC: 32.7 G/DL (ref 31.4–37.4)
MCV RBC AUTO: 89 FL (ref 82–98)
MONOCYTES # BLD AUTO: 0.64 THOUSAND/ΜL (ref 0.17–1.22)
MONOCYTES NFR BLD AUTO: 6 % (ref 4–12)
NEUTROPHILS # BLD AUTO: 8.59 THOUSANDS/ΜL (ref 1.85–7.62)
NEUTS SEG NFR BLD AUTO: 82 % (ref 43–75)
NITRITE UR QL STRIP: NEGATIVE
NON-SQ EPI CELLS URNS QL MICRO: ABNORMAL /HPF
NRBC BLD AUTO-RTO: 0 /100 WBCS
PH UR STRIP.AUTO: 5.5 [PH] (ref 4.5–8)
PLATELET # BLD AUTO: 148 THOUSANDS/UL (ref 149–390)
PMV BLD AUTO: 10.2 FL (ref 8.9–12.7)
POTASSIUM SERPL-SCNC: 4.7 MMOL/L (ref 3.5–5.3)
PROT UR STRIP-MCNC: ABNORMAL MG/DL
RBC # BLD AUTO: 4.08 MILLION/UL (ref 3.88–5.62)
RBC #/AREA URNS AUTO: ABNORMAL /HPF
SODIUM SERPL-SCNC: 134 MMOL/L (ref 136–145)
SP GR UR STRIP.AUTO: 1.01 (ref 1–1.03)
UROBILINOGEN UR QL STRIP.AUTO: 0.2 E.U./DL
WBC # BLD AUTO: 10.57 THOUSAND/UL (ref 4.31–10.16)
WBC #/AREA URNS AUTO: ABNORMAL /HPF

## 2018-09-11 PROCEDURE — 82948 REAGENT STRIP/BLOOD GLUCOSE: CPT

## 2018-09-11 PROCEDURE — 36415 COLL VENOUS BLD VENIPUNCTURE: CPT | Performed by: EMERGENCY MEDICINE

## 2018-09-11 PROCEDURE — 71046 X-RAY EXAM CHEST 2 VIEWS: CPT

## 2018-09-11 PROCEDURE — 81001 URINALYSIS AUTO W/SCOPE: CPT

## 2018-09-11 PROCEDURE — 99220 PR INITIAL OBSERVATION CARE/DAY 70 MINUTES: CPT | Performed by: HOSPITALIST

## 2018-09-11 PROCEDURE — 99285 EMERGENCY DEPT VISIT HI MDM: CPT

## 2018-09-11 PROCEDURE — 80048 BASIC METABOLIC PNL TOTAL CA: CPT | Performed by: EMERGENCY MEDICINE

## 2018-09-11 PROCEDURE — 85025 COMPLETE CBC W/AUTO DIFF WBC: CPT | Performed by: EMERGENCY MEDICINE

## 2018-09-11 PROCEDURE — 87147 CULTURE TYPE IMMUNOLOGIC: CPT

## 2018-09-11 PROCEDURE — 87086 URINE CULTURE/COLONY COUNT: CPT

## 2018-09-11 RX ORDER — PRAVASTATIN SODIUM 80 MG/1
80 TABLET ORAL
Status: DISCONTINUED | OUTPATIENT
Start: 2018-09-12 | End: 2018-09-12 | Stop reason: HOSPADM

## 2018-09-11 RX ORDER — TAMSULOSIN HYDROCHLORIDE 0.4 MG/1
0.4 CAPSULE ORAL
Status: DISCONTINUED | OUTPATIENT
Start: 2018-09-12 | End: 2018-09-12 | Stop reason: HOSPADM

## 2018-09-11 RX ORDER — PANTOPRAZOLE SODIUM 40 MG/1
40 TABLET, DELAYED RELEASE ORAL
Status: DISCONTINUED | OUTPATIENT
Start: 2018-09-12 | End: 2018-09-12 | Stop reason: HOSPADM

## 2018-09-11 RX ORDER — LEVALBUTEROL 1.25 MG/.5ML
1.25 SOLUTION, CONCENTRATE RESPIRATORY (INHALATION)
Status: DISCONTINUED | OUTPATIENT
Start: 2018-09-11 | End: 2018-09-12

## 2018-09-11 RX ORDER — OXYCODONE HYDROCHLORIDE AND ACETAMINOPHEN 5; 325 MG/1; MG/1
1 TABLET ORAL EVERY 4 HOURS PRN
Status: DISCONTINUED | OUTPATIENT
Start: 2018-09-11 | End: 2018-09-12 | Stop reason: HOSPADM

## 2018-09-11 RX ORDER — FUROSEMIDE 40 MG/1
40 TABLET ORAL 2 TIMES DAILY
Status: DISCONTINUED | OUTPATIENT
Start: 2018-09-12 | End: 2018-09-12 | Stop reason: HOSPADM

## 2018-09-11 RX ORDER — BUDESONIDE AND FORMOTEROL FUMARATE DIHYDRATE 160; 4.5 UG/1; UG/1
2 AEROSOL RESPIRATORY (INHALATION) 2 TIMES DAILY
Status: DISCONTINUED | OUTPATIENT
Start: 2018-09-12 | End: 2018-09-12 | Stop reason: HOSPADM

## 2018-09-11 RX ORDER — ACETAMINOPHEN 325 MG/1
650 TABLET ORAL EVERY 6 HOURS PRN
Status: DISCONTINUED | OUTPATIENT
Start: 2018-09-11 | End: 2018-09-12 | Stop reason: HOSPADM

## 2018-09-11 RX ORDER — CHLORAL HYDRATE 500 MG
1000 CAPSULE ORAL 2 TIMES DAILY
Status: DISCONTINUED | OUTPATIENT
Start: 2018-09-12 | End: 2018-09-12 | Stop reason: HOSPADM

## 2018-09-11 RX ORDER — FLUTICASONE PROPIONATE 50 MCG
1 SPRAY, SUSPENSION (ML) NASAL DAILY
Status: DISCONTINUED | OUTPATIENT
Start: 2018-09-12 | End: 2018-09-12 | Stop reason: HOSPADM

## 2018-09-11 RX ORDER — ASPIRIN 81 MG/1
81 TABLET ORAL DAILY
Status: DISCONTINUED | OUTPATIENT
Start: 2018-09-12 | End: 2018-09-12 | Stop reason: HOSPADM

## 2018-09-11 RX ORDER — FERROUS SULFATE 325(65) MG
325 TABLET ORAL
Status: DISCONTINUED | OUTPATIENT
Start: 2018-09-12 | End: 2018-09-12 | Stop reason: HOSPADM

## 2018-09-11 RX ORDER — POTASSIUM CHLORIDE 20 MEQ/1
20 TABLET, EXTENDED RELEASE ORAL 2 TIMES DAILY
Status: DISCONTINUED | OUTPATIENT
Start: 2018-09-12 | End: 2018-09-12 | Stop reason: HOSPADM

## 2018-09-11 NOTE — ED PROVIDER NOTES
History  Chief Complaint   Patient presents with    Hyperglycemia - no symptoms     Patient states "my blood sugar was like 500 at home, I took some insulin"  Patient has no complaints      72-year-old male history of COPD/heart failure/diabetes insulin dependent; comes in for evaluation of hyperglycemia  States morning glucose was 133  This evening glucose was 533 called his doctors told to come in for evaluation  Denies any new symptoms of lightheadedness/headache/chest pain/shortness of breath  Denies any abdominal pain  Does wear oxygen at baseline; his electric got shut off today and he states he is very upset about his oxygen situation given the lack of energy and that was the only changed was routine today (States in pulm note needs to be on 3L) Denies any missing doses of insulin  Denies any other findings  Denies any falls or accidents  Denies any changes in dietary habits  Patient is comfortable  Patient did take insulin after sugar was elevated and here his glucose is 313  Patient offiers no other complaints at this time  Prior to Admission Medications   Prescriptions Last Dose Informant Patient Reported? Taking?    Insulin Regular Human, Conc, (Insulin Regular Human, Conc,) 500 units/mL CONCENTRATED U-500 injection pen  Self Yes Yes   Sig: Inject 35 Units under the skin daily with dinner   Insulin Syringe/Needle U-500 (BD INSULIN SYRINGE U-500) 31G X 6MM 0 5 ML MISC  Self Yes No   Sig: by Does not apply route   Omega-3 Fatty Acids (FISH OIL) 645 MG CAPS  Self Yes No   Sig: Take 1 capsule by mouth 2 (two) times a day   aspirin (ECOTRIN LOW STRENGTH) 81 mg EC tablet  Self Yes No   Sig: Take 1 tablet by mouth daily   budesonide-formoterol (SYMBICORT) 160-4 5 mcg/act inhaler  Self Yes No   Sig: Inhale 2 puffs 2 (two) times a day   ferrous sulfate 325 (65 Fe) mg tablet  Self Yes No   Sig: Take 325 mg by mouth daily with breakfast   fluticasone (FLONASE) 50 mcg/act nasal spray  Self No No Si spray into each nostril daily   furosemide (LASIX) 40 mg tablet  Self No No   Sig: Take 1 tablet (40 mg total) by mouth 2 (two) times a day   insulin regular (HumuLIN R U-500) 500 units/mL CONCENTRATED injection  Self No No   Sig: Inject 0 09 mL (45 Units total) under the skin 2 (two) times a day before breakfast and lunch   levalbuterol (XOPENEX) 1 25 mg/3 mL nebulizer solution  Self Yes No   Sig: USE 1 VIAL IN NEBULIZER THREE TIMES A DAY   metoprolol tartrate (LOPRESSOR) 25 mg tablet  Self Yes No   Sig: Take 1 tablet by mouth 2 (two) times a day   oxyCODONE-acetaminophen (PERCOCET) 7 5-325 MG per tablet  Self Yes No   Sig: Take by mouth every 4 (four) hours   pantoprazole (PROTONIX) 40 mg tablet  Self No No   Sig: Take 1 tablet by mouth 2 (two) times a day before meals   potassium chloride (K-DUR,KLOR-CON) 20 mEq tablet  Self No No   Sig: Take 1 tablet (20 mEq total) by mouth 2 (two) times a day   simvastatin (ZOCOR) 40 mg tablet  Self Yes No   Sig: Take 40 mg by mouth daily   tamsulosin (FLOMAX) 0 4 mg  Self No No   Sig: Take 1 capsule (0 4 mg total) by mouth daily with dinner   tiotropium (SPIRIVA) 18 mcg inhalation capsule  Self Yes No   Sig: Place 18 mcg into inhaler and inhale daily      Facility-Administered Medications: None       Past Medical History:   Diagnosis Date    Abdominal pain     Cardiac disease     [facility]     [facility]     Coronary artery disease     [facility]     Hyperlipidemia     Hypertension     MI (myocardial infarction) [facility] )     with 3 stents    Prostate cancer ([facility] )        Past Surgical History:   Procedure Laterality Date    ABDOMINAL SURGERY      exploratory    ANGIOPLASTY      3 stents    ESOPHAGOGASTRODUODENOSCOPY N/A 10/2/2017    Procedure: ESOPHAGOGASTRODUODENOSCOPY (EGD); Surgeon: Tayler Leija MD;  Location: [facility];   Service: Gastroenterology    PROSTATE SURGERY Family History   Problem Relation Age of Onset    Heart disease Father     Other Father         Mesothelioma      I have reviewed and agree with the history as documented  Social History   Substance Use Topics    Smoking status: Former Smoker     Packs/day: 1 50     Years: 50 00     Quit date: 9/13/2017    Smokeless tobacco: Never Used    Alcohol use No        Review of Systems   Constitutional: Negative for activity change, appetite change, chills and fever  No new complaints from baseline   HENT: Negative for congestion, rhinorrhea and sore throat  Eyes: Negative for photophobia and pain  Respiratory: Negative for cough, chest tightness and wheezing  Cardiovascular: Negative for chest pain, palpitations and leg swelling  Gastrointestinal: Negative for abdominal distention, abdominal pain, constipation, diarrhea and nausea  Genitourinary: Negative for dysuria, flank pain, frequency and hematuria  Musculoskeletal: Negative for arthralgias, back pain, myalgias, neck pain and neck stiffness  Skin: Negative for color change and rash  Neurological: Negative for seizures, speech difficulty, weakness, light-headedness and headaches  Hematological: Negative for adenopathy  Psychiatric/Behavioral: Negative for agitation, confusion, hallucinations and suicidal ideas  Physical Exam  ED Triage Vitals [09/11/18 1814]   Temperature Pulse Respirations Blood Pressure SpO2   98 8 °F (37 1 °C) (!) 107 20 125/56 96 %      Temp Source Heart Rate Source Patient Position - Orthostatic VS BP Location FiO2 (%)   Oral Monitor Sitting Right arm --      Pain Score       No Pain           Orthostatic Vital Signs  Vitals:    09/11/18 2058 09/11/18 2133 09/12/18 0555 09/12/18 0805   BP: 132/60 162/84  139/62   Pulse: 77 80 72 81   Patient Position - Orthostatic VS: Lying Sitting  Lying       Physical Exam   Constitutional: He is oriented to person, place, and time  No distress  Obese  Unkept  On nasal cannula which is baseline   HENT:   Head: Normocephalic and atraumatic  Mouth/Throat: Oropharynx is clear and moist  No oropharyngeal exudate  Eyes: EOM are normal  Pupils are equal, round, and reactive to light  Right eye exhibits no discharge  Left eye exhibits no discharge  Neck: Normal range of motion  Neck supple  No JVD present  No tracheal deviation present  Cardiovascular: Normal rate and normal heart sounds  No murmur heard  Pulmonary/Chest: Effort normal  No respiratory distress  He has wheezes  He has no rales  Intermittent wheezing throughout no respiratory distress  No conversational dyspnea   Abdominal: Soft  Bowel sounds are normal  He exhibits no distension  There is no tenderness  There is no rebound and no guarding  Soft without tenderness throughout   Musculoskeletal: Normal range of motion  He exhibits no edema or deformity  Lymphadenopathy:     He has no cervical adenopathy  Neurological: He is alert and oriented to person, place, and time  No cranial nerve deficit  He exhibits normal muscle tone  Skin: Skin is warm and dry  Capillary refill takes less than 2 seconds  No erythema  No pallor  Psychiatric:   Agitated  Poor insight into healthcare         ED Medications  Medications   enoxaparin (LOVENOX) subcutaneous injection 40 mg (40 mg Subcutaneous Given 9/12/18 0954)   acetaminophen (TYLENOL) tablet 650 mg (not administered)   insulin lispro (HumaLOG) 100 units/mL subcutaneous injection 1-5 Units (1 Units Subcutaneous Not Given 9/12/18 1153)   insulin lispro (HumaLOG) 100 units/mL subcutaneous injection 1-5 Units (1 Units Subcutaneous Not Given 9/12/18 0011)   aspirin (ECOTRIN LOW STRENGTH) EC tablet 81 mg (81 mg Oral Given 9/12/18 0954)   budesonide-formoterol (SYMBICORT) 160-4 5 mcg/act inhaler 2 puff (2 puffs Inhalation Given 9/12/18 0954)   ferrous sulfate tablet 325 mg (325 mg Oral Given 9/12/18 0952)   fluticasone (FLONASE) 50 mcg/act nasal spray 1 spray (1 spray Nasal Given 9/12/18 0954)   furosemide (LASIX) tablet 40 mg (40 mg Oral Given 9/12/18 0954)   insulin regular (HumuLIN R U-500) 500 units/mL CONCENTRATED injection 45 Units (45 Units Subcutaneous Given 9/12/18 1151)   metoprolol tartrate (LOPRESSOR) tablet 25 mg (25 mg Oral Given 9/12/18 0954)   fish oil capsule 1,000 mg (1,000 mg Oral Given 9/12/18 0954)   oxyCODONE-acetaminophen (PERCOCET) 5-325 mg per tablet 1 tablet (1 tablet Oral Given 9/12/18 0954)   pantoprazole (PROTONIX) EC tablet 40 mg (40 mg Oral Given 9/12/18 0610)   potassium chloride (K-DUR,KLOR-CON) CR tablet 20 mEq (20 mEq Oral Given 9/12/18 0954)   pravastatin (PRAVACHOL) tablet 80 mg (not administered)   tamsulosin (FLOMAX) capsule 0 4 mg (not administered)   tiotropium (SPIRIVA) capsule for inhaler 18 mcg (18 mcg Inhalation Given 9/12/18 0955)   albuterol inhalation solution 2 5 mg (not administered)       Diagnostic Studies  Results Reviewed     Procedure Component Value Units Date/Time    Urine Microscopic [[id number]]  (Abnormal) Collected:  09/1947    Lab Status:  Final result Specimen:  Urine from Urine, Other Updated:  09/11/18 2005     RBC, UA None Seen /hpf      WBC, UA 10-20 (A) /hpf      Epithelial Cells None Seen /hpf      Bacteria, UA Occasional /hpf      Hyaline Casts, UA None Seen /lpf     Urine culture [[id number]] Collected:  09/1947    Lab Status:   In process Specimen:  Urine from Urine, Other Updated:  09/11/18 2005    POCT urinalysis dipstick [[id number]]  (Normal) Resulted:  09/11/18 1949    Lab Status:  Final result Updated:  09/11/18 1949     Color, UA see chart    ED Urine Macroscopic [[id number]]  (Abnormal) Collected:  09/1947    Lab Status:  Final result Specimen:  Urine Updated:  09/11/18 1948     Color, UA Yellow     Clarity, UA Clear     pH, UA 5 5     Leukocytes, UA Negative     Nitrite, UA Negative     Protein,  (2+) (A) mg/dl      Glucose,  (1/2%) (A) mg/dl      Ketones, UA Negative mg/dl      Urobilinogen, UA 0 2 E U /dl      Bilirubin, UA Negative     Blood, UA Trace (A)     Specific Tad, UA 1 015    Narrative:       CLINITEK RESULT    Basic metabolic panel [[id number]]  (Abnormal) Collected:  09/11/18 1854    Lab Status:  Final result Specimen:  Blood from Arm, Left Updated:  09/11/18 1918     Sodium 134 (L) mmol/L      Potassium 4 7 mmol/L      Chloride 97 (L) mmol/L      CO2 31 mmol/L      ANION GAP 6 mmol/L      BUN 14 mg/dL      Creatinine 1 30 mg/dL      Glucose 270 (H) mg/dL      Calcium 9 4 mg/dL      eGFR 55 ml/min/1 73sq m     Narrative:         National Kidney Disease Education Program recommendations are as follows:  GFR calculation is accurate only with a steady state creatinine  Chronic Kidney disease less than 60 ml/min/1 73 sq  meters  Kidney failure less than 15 ml/min/1 73 sq  meters      CBC and differential [[id number]]  (Abnormal) Collected:  09/11/18 1854    Lab Status:  Final result Specimen:  Blood from Arm, Left Updated:  09/11/18 1904     WBC 10 57 (H) Thousand/uL      RBC 4 08 Million/uL      Hemoglobin 11 8 (L) g/dL      Hematocrit 36 1 (L) %      MCV 89 fL      MCH 28 9 pg      MCHC 32 7 g/dL      RDW 13 7 %      MPV 10 2 fL      Platelets 629 (L) Thousands/uL      nRBC 0 /100 WBCs      Neutrophils Relative 82 (H) %      Immat GRANS % 0 %      Lymphocytes Relative 11 (L) %      Monocytes Relative 6 %      Eosinophils Relative 1 %      Basophils Relative 0 %      Neutrophils Absolute 8 59 (H) Thousands/µL      Immature Grans Absolute 0 04 Thousand/uL      Lymphocytes Absolute 1 16 Thousands/µL      Monocytes Absolute 0 64 Thousand/µL      Eosinophils Absolute 0 12 Thousand/µL      Basophils Absolute 0 02 Thousands/µL     Fingerstick Glucose (POCT) [[id number]]  (Abnormal) Collected:  09/11/18 1821    Lab Status:  Final result Updated:  09/11/18 1822     POC Glucose 317 (H) mg/dl                  XR chest 2 views   Final Result by Genesis Del Valle MD (09/12 6776) Increased right costophrenic angle pleural thickening/small effusion  Minimal right base atelectasis  Trace left effusion/pleural thickening  Follow-up recommended  If right costophrenic angle pleural thickening/effusion persists, CT evaluation may    also be considered  Workstation performed: [id number]               Procedures  Procedures      Phone Consults  ED Phone Contact    ED Course  ED Course as of Sep 12 1219   Tue Sep 11, 2018   5036 Prior 11 2 Hemoglobin: (!) 11 8   1932 Glucose: (!) 270   2039 3L at baseline                                MDM  Number of Diagnoses or Management Options  Hypoxia:   Diagnosis management comments: Stated glucose was over 500 here initial glucose is 313  Check for inciting event for hypoglycemia and BMP reveals glucose in the mid 200s  No etiology found for inciting event for hyperglycemia suspect likely noncompliance versus changing dietary habits  Patient states he has no electricity at home and would go home without oxygen if we discharged him and per Pulmonary no must been oxygen at all times due to hypoxia  Patient states was no he can stay with there is no when he can call there is no else he can go  Therefore in the patient's best interest had to bring in hospital overnight to allow him to get continual oxygen    Will need case management evaluation in morning    CritCare Time    Disposition  Final diagnoses:   Hypoxia     Time reflects when diagnosis was documented in both MDM as applicable and the Disposition within this note     Time User Action Codes Description Comment    9/11/2018  8:48 PM [place] Roderick Add [R09 02] Hypoxia     9/12/2018 11:53 AM Lo Leonard [J44 9] COPD (chronic obstructive pulmonary disease) ([facility] )     9/12/2018 11:53 AM Lo Leonard [R06 02] Shortness of breath - Chronic respiratory failure with hypoxia       ED Disposition     ED Disposition Condition Comment    Admit  Case was discussed with VIKTORIA and the patient's admission status was agreed to be Admission Status: observation status to the service of Dr Daniel Mittal           Follow-up Information     Follow up With Specialties Details Why [facility]  Follow up  [address] [facility] [id number] [phone]      Lori Peace MD  Follow up  [phone] [facility]  [phone]            Discharge Medication List as of 9/12/2018 11:57 AM      CONTINUE these medications which have NOT CHANGED    Details   Insulin Regular Human, Conc, (Insulin Regular Human, Conc,) 500 units/mL CONCENTRATED U-500 injection pen Inject 35 Units under the skin daily with dinner, Historical Med      aspirin (ECOTRIN LOW STRENGTH) 81 mg EC tablet Take 1 tablet by mouth daily, Starting Tue 12/6/2016, Historical Med      budesonide-formoterol (SYMBICORT) 160-4 5 mcg/act inhaler Inhale 2 puffs 2 (two) times a day, Historical Med      ferrous sulfate 325 (65 Fe) mg tablet Take 325 mg by mouth daily with breakfast, Historical Med      fluticasone (FLONASE) 50 mcg/act nasal spray 1 spray into each nostril daily, Starting Tue 2/13/2018, No Print      furosemide (LASIX) 40 mg tablet Take 1 tablet (40 mg total) by mouth 2 (two) times a day, Starting Thu 7/12/2018, Normal      insulin regular (HumuLIN R U-500) 500 units/mL CONCENTRATED injection Inject 0 09 mL (45 Units total) under the skin 2 (two) times a day before breakfast and lunch, Starting Mon 2/12/2018, No Print      Insulin Syringe/Needle U-500 (BD INSULIN SYRINGE U-500) 31G X 6MM 0 5 ML MISC by Does not apply route, Starting Tue 8/15/2017, Historical Med      levalbuterol (XOPENEX) 1 25 mg/3 mL nebulizer solution USE 1 VIAL IN NEBULIZER THREE TIMES A DAY, Historical Med      metoprolol tartrate (LOPRESSOR) 25 mg tablet Take 1 tablet by mouth 2 (two) times a day, Historical Med      Omega-3 Fatty Acids (FISH OIL) 645 MG CAPS Take 1 capsule by mouth 2 (two) times a day, Historical Med      oxyCODONE-acetaminophen (PERCOCET) 7 5-325 MG per tablet Take by mouth every 4 (four) hours, Starting Tue 3/21/2017, Historical Med      pantoprazole (PROTONIX) 40 mg tablet Take 1 tablet by mouth 2 (two) times a day before meals, Starting Thu 10/5/2017, Print      potassium chloride (K-DUR,KLOR-CON) 20 mEq tablet Take 1 tablet (20 mEq total) by mouth 2 (two) times a day, Starting Mon 2/12/2018, No Print      simvastatin (ZOCOR) 40 mg tablet Take 40 mg by mouth daily, Starting Thu 3/15/2018, Historical Med      tamsulosin (FLOMAX) 0 4 mg Take 1 capsule (0 4 mg total) by mouth daily with dinner, Starting Sat 5/26/2018, Print      tiotropium (SPIRIVA) 18 mcg inhalation capsule Place 18 mcg into inhaler and inhale daily, Historical Med             Outpatient Discharge Orders  Ambulatory Referral to Home Health   Standing Status: Future  Standing Exp  Date: 03/12/19     Discharge Diet     Activity as tolerated         ED Provider  Attending physically available and evaluated Enio Raymundo I managed the patient along with the ED Attending      Electronically Signed by         Eli Berry DO  09/12/18 8970

## 2018-09-12 VITALS
WEIGHT: 261 LBS | TEMPERATURE: 98 F | RESPIRATION RATE: 18 BRPM | HEIGHT: 72 IN | DIASTOLIC BLOOD PRESSURE: 62 MMHG | HEART RATE: 81 BPM | SYSTOLIC BLOOD PRESSURE: 139 MMHG | OXYGEN SATURATION: 92 % | BODY MASS INDEX: 35.35 KG/M2

## 2018-09-12 PROBLEM — Z99.81 OXYGEN DEPENDENT: Status: ACTIVE | Noted: 2018-09-12

## 2018-09-12 PROBLEM — R73.9 ELEVATED BLOOD SUGAR: Status: RESOLVED | Noted: 2018-09-11 | Resolved: 2018-09-12

## 2018-09-12 PROBLEM — R09.02 HYPOXIA: Status: RESOLVED | Noted: 2018-09-11 | Resolved: 2018-09-12

## 2018-09-12 LAB
EST. AVERAGE GLUCOSE BLD GHB EST-MCNC: 174 MG/DL
GLUCOSE SERPL-MCNC: 136 MG/DL (ref 65–140)
GLUCOSE SERPL-MCNC: 292 MG/DL (ref 65–140)
GLUCOSE SERPL-MCNC: 65 MG/DL (ref 65–140)
HBA1C MFR BLD: 7.7 % (ref 4.2–6.3)

## 2018-09-12 PROCEDURE — 99217 PR OBSERVATION CARE DISCHARGE MANAGEMENT: CPT | Performed by: PHYSICIAN ASSISTANT

## 2018-09-12 PROCEDURE — 83036 HEMOGLOBIN GLYCOSYLATED A1C: CPT | Performed by: HOSPITALIST

## 2018-09-12 PROCEDURE — 94760 N-INVAS EAR/PLS OXIMETRY 1: CPT

## 2018-09-12 PROCEDURE — 82948 REAGENT STRIP/BLOOD GLUCOSE: CPT

## 2018-09-12 RX ORDER — ALBUTEROL SULFATE 2.5 MG/3ML
2.5 SOLUTION RESPIRATORY (INHALATION) EVERY 6 HOURS PRN
Status: DISCONTINUED | OUTPATIENT
Start: 2018-09-12 | End: 2018-09-12 | Stop reason: HOSPADM

## 2018-09-12 RX ADMIN — ENOXAPARIN SODIUM 40 MG: 40 INJECTION SUBCUTANEOUS at 09:54

## 2018-09-12 RX ADMIN — PANTOPRAZOLE SODIUM 40 MG: 40 TABLET, DELAYED RELEASE ORAL at 06:10

## 2018-09-12 RX ADMIN — ASPIRIN 81 MG: 81 TABLET, COATED ORAL at 09:54

## 2018-09-12 RX ADMIN — OXYCODONE HYDROCHLORIDE AND ACETAMINOPHEN 1 TABLET: 5; 325 TABLET ORAL at 09:54

## 2018-09-12 RX ADMIN — Medication 1000 MG: at 09:54

## 2018-09-12 RX ADMIN — FERROUS SULFATE TAB 325 MG (65 MG ELEMENTAL FE) 325 MG: 325 (65 FE) TAB at 09:52

## 2018-09-12 RX ADMIN — OXYCODONE HYDROCHLORIDE AND ACETAMINOPHEN 1 TABLET: 5; 325 TABLET ORAL at 00:16

## 2018-09-12 RX ADMIN — POTASSIUM CHLORIDE 20 MEQ: 1500 TABLET, EXTENDED RELEASE ORAL at 09:54

## 2018-09-12 RX ADMIN — TIOTROPIUM BROMIDE 18 MCG: 18 CAPSULE ORAL; RESPIRATORY (INHALATION) at 09:55

## 2018-09-12 RX ADMIN — METOPROLOL TARTRATE 25 MG: 25 TABLET ORAL at 09:54

## 2018-09-12 RX ADMIN — FLUTICASONE PROPIONATE 1 SPRAY: 50 SPRAY, METERED NASAL at 09:54

## 2018-09-12 RX ADMIN — FUROSEMIDE 40 MG: 40 TABLET ORAL at 09:54

## 2018-09-12 RX ADMIN — INSULIN HUMAN 45 UNITS: 500 INJECTION, SOLUTION SUBCUTANEOUS at 11:51

## 2018-09-12 RX ADMIN — BUDESONIDE AND FORMOTEROL FUMARATE DIHYDRATE 2 PUFF: 160; 4.5 AEROSOL RESPIRATORY (INHALATION) at 09:54

## 2018-09-12 NOTE — CASE MANAGEMENT
Initial Clinical Review    Admission: Date/Time/Statement: Observation 9/11 @ 2048    Orders Placed This Encounter   Procedures    Place in Observation (expected length of stay for this patient is less than two midnights)     Standing Status:   Standing     Number of Occurrences:   1     Order Specific Question:   Admitting Physician     Answer:   Nelly Escalona [[id number]]     Order Specific Question:   Level of Care     Answer:   Med Surg [16]       ED: Date/Time/Mode of Arrival:   ED Arrival Information     Expected Arrival Acuity Means of Arrival Escorted By Service Admission Type    - 9/11/2018 18:07 Urgent Ambulance [facility] General Medicine Urgent    Arrival Complaint    high blood sugar          Chief Complaint:   Chief Complaint   Patient presents with    Hyperglycemia - no symptoms     Patient states "my blood sugar was like 500 at home, I took some insulin"  Patient has no complaints        History of Illness:  72 y o  male who presents with history of COPD with chronic O2 use, diastolic congestive heart failure, obesity, hypertension, hyperlipidemia presents emergency department with complaint of shortness of breath  Patient reports that his electricity was turned off earlier today and he was without oxygen throughout the day  He came to the emergency department for shortness of breath  He states that he will be able to pay his electric bill tomorrow and have his services restored  He reports that he is chronically short of breath  Per ED records patient hypoxic at baseline with O2 sats in the low 80s without oxygen       ED Vital Signs:   ED Triage Vitals [09/11/18 1814]   Temperature Pulse Respirations Blood Pressure SpO2   98 8 °F (37 1 °C) (!) 107 20 125/56 96 %      Temp Source Heart Rate Source Patient Position - Orthostatic VS BP Location FiO2 (%)   Oral Monitor Sitting Right arm --      Pain Score       No Pain        Wt Readings from Last 1 Encounters:   09/11/18 118 kg (261 lb)     O2 3 5 L N/C 92%    Vital Signs (abnormal): WNL    Abnormal Labs: Na = 134  Glucose = 270/ 317  Wbc = 10 57  H/H = 11 8/36 1    Diagnostic Test Results: CXR - Increased right costophrenic angle pleural thickening/small effusion  Minimal right base atelectasis  Trace left effusion/pleural thickening  Follow-up recommended  If right costophrenic angle pleural thickening/effusion persists, CT evaluation may also be considered        ED Treatment:   Medication Administration from 09/11/2018 1807 to 09/11/2018 2131     None          Past Medical/Surgical History:    Active Ambulatory Problems     Diagnosis Date Noted    Obesity (BMI 30-39 9) 01/20/2016    Dyslipidemia 01/20/2016    Type 2 diabetes mellitus with hyperglycemia, with long-term current use of insulin ([facility] ) 11/13/2016    Venous stasis dermatitis of both lower extremities 11/15/2016    Acute on chronic respiratory failure with hypoxia and hypercapnia [facility]) 07/23/2017    COPD (chronic obstructive pulmonary disease) ([facility]) 07/23/2017    Chronic kidney disease, stage 3 07/24/2017    Shortness of breath - Chronic respiratory failure with hypoxia 10/31/2017    Chronic diastolic heart failure [facility] 02/01/2018    Poor dentition 02/03/2018    Tremors of nervous system 02/11/2018    Essential hypertension 12/06/2016    Diabetic neuropathy ([facility]) 01/20/2017    Insulin dependent diabetes mellitus  05/23/2018    Acute on chronic diastolic (congestive) heart failure 05/23/2018    Elevated d-dimer 05/23/2018    CAD (coronary artery disease) 05/23/2018    Urinary frequency 05/23/2018    Hyponatremia 05/24/2018    RBBB 06/19/2018     Resolved Ambulatory Problems     Diagnosis Date Noted    Coronary artery disease involving native coronary artery without angina pectoris 01/20/2016    Chest pain 01/20/2016    Gastroesophageal reflux disease without esophagitis 01/20/2016    COPD exacerbation ([facility] 01/20/2016    H/O prostate cancer 01/20/2016  Back pain 11/12/2016    Dizziness 11/12/2016    Diabetic polyneuropathy associated with type 2 diabetes mellitus ([place] [address] ) 11/15/2016    Opioid dependence ([place] [address] ) 11/17/2016    Leukocytosis 07/17/2017    Bilateral lower extremity edema 07/24/2017    Hyponatremia 07/24/2017    Partial gastric outlet obstruction 09/28/2017    Duodenitis 09/28/2017    Hypernatremia 10/01/2017    Acute duodenal ulcer with bleeding 10/02/2017    Acute blood loss anemia 10/02/2017    Pleural effusion on right 10/31/2017    Accelerated hypertension 10/31/2017    Non-ST elevation myocardial infarction (NSTEMI), type 2 02/01/2018    NSTEMI type 2  05/23/2018    Hypovolemic shock ([place] [address] ) 06/19/2018    Acute metabolic encephalopathy 65/23/4130    Lactic acidosis 06/19/2018    MARANDA (acute kidney injury) ([place] [address] ) 06/19/2018    Respiratory acidosis 06/19/2018     Past Medical History:   Diagnosis Date    Abdominal pain     Cardiac disease     CHF (congestive heart failure) ([place] [address] )     COPD (chronic obstructive pulmonary disease) ([place] [address] )     Coronary artery disease     Diabetes mellitus ([place] [address] )     Hyperlipidemia     Hypertension     MI (myocardial infarction) ([place] [address] )     Prostate cancer ([place] [address] )        Admitting Diagnosis: Elevated blood sugar [R73 9]  Hypoxia [R09 02]    Age/Sex: 72 y o  male    Assessment/Plan:   71y Male to ED with Elevated Blood Sugar/Shortness of Breath/ OPD with chronic O2 use  Poorly controlled diabetes mellitus  Unclear of patient compliance with insulin and diet  Patient electricity was shut off and he he is hypoxic at baseline without supplemental oxygen  Patient reports that he will be able to have is power turned back on tomorrow       Admission Orders:  Scheduled Meds:   Current Facility-Administered Medications:  aspirin 81 mg Oral Daily   budesonide-formoterol 2 puff Inhalation BID   enoxaparin 40 mg Subcutaneous Daily   ferrous sulfate 325 mg Oral Daily With Breakfast   fish oil 1,000 mg Oral BID fluticasone 1 spray Nasal Daily   furosemide 40 mg Oral BID   insulin lispro 1-5 Units Subcutaneous TID AC   insulin lispro 1-5 Units Subcutaneous HS   insulin regular 45 Units Subcutaneous BID before breakfast/lunch   metoprolol tartrate 25 mg Oral BID   pantoprazole 40 mg Oral BID AC   potassium chloride 20 mEq Oral BID   pravastatin 80 mg Oral Daily With Dinner   tamsulosin 0 4 mg Oral Daily With Dinner   tiotropium 18 mcg Inhalation Daily     Continuous Infusions:    PRN Meds:   acetaminophen    albuterol    oxyCODONE-acetaminophen

## 2018-09-12 NOTE — ASSESSMENT & PLAN NOTE
· Chest x-ray:  Increased right costophrenic angle pleural thickening/small effusion  · Patient appears well compensated at this time  · Continue Lasix and metoprolol

## 2018-09-12 NOTE — ASSESSMENT & PLAN NOTE
Lab Results   Component Value Date    HGBA1C 7 7 (H) 09/12/2018     Recent Labs      09/11/18   1821  09/11/18   2156  09/11/18   2255  09/12/18   0807   POCGLU  317*  74  135  65     Blood Sugar Average: Last 72 hrs:  (P) 147 75     · Patient took an extra dose of U-500 yesterday, which may be cause of AM hypoglycemia today  Patient just completed breakfast   Will repeat blood sugar now  If BS > 150, will give AM insulin  · Home regimen: U-500 45 units @ 8AM, 45 units @ noon and 35 units @ 6PM   · Blood sugars at home range from 150-250  · Patient is fairly compliant with ADA diet at home  · Patient follows with Dr Karime Machado as an out-patient

## 2018-09-12 NOTE — ASSESSMENT & PLAN NOTE
· Patient's baseline oxygen requirement:  3 L continuous nasal cannula  · Patient has electricity was turned off yesterday and after 3 hours he became short of breath  · Will confirm electricity is been turned on before patient can be discharged  · Currently patient is at baseline oxygen requirement

## 2018-09-12 NOTE — DISCHARGE SUMMARY
Discharge- Irizarry Pod Sr  1945, 72 y o  male MRN: [id number]  Unit/Bed#: [facility] 929-01 Encounter: [id number]  Primary Care Provider: Nigel Hannon MD   Date and time admitted to hospital: 9/11/2018  6:08 PM    * Oxygen dependent   Assessment & Plan    · Patient's baseline oxygen requirement:  3 L continuous nasal cannula  · Patient has electricity was turned off yesterday and after 3 hours he became short of breath  · Will confirm electricity is been turned on before patient can be discharged---confirmed  · Currently patient is at baseline oxygen requirement  Insulin dependent diabetes mellitus    Assessment & Plan    Lab Results   Component Value Date    HGBA1C 7 7 (H) 09/12/2018     Recent Labs      09/11/18   2156  09/11/18   2255  09/12/18   0807  09/12/18   0915   POCGLU  74  135  65  136     Blood Sugar Average: Last 72 hrs:  (P) 145 4     · Patient took an extra dose of U-500 yesterday, which may be cause of AM hypoglycemia today  Patient just completed breakfast   Will repeat blood sugar now  If BS > 150, will give AM insulin  · Home regimen: U-500 45 units @ 8AM, 45 units @ noon and 35 units @ 6PM   · Blood sugars at home range from 150-250  · Patient is fairly compliant with ADA diet at home  · Patient follows with Dr Amber Wooten as an out-patient  Chronic diastolic heart failure (HCC)   Assessment & Plan    · Chest x-ray:  Increased right costophrenic angle pleural thickening/small effusion  · Patient appears well compensated at this time  · Continue Lasix and metoprolol  Chronic kidney disease, stage 3   Assessment & Plan    · Baseline creatinine:  1 3-1 4        COPD (chronic obstructive pulmonary disease) (HCC)   Assessment & Plan    · On chronic nasal cannula oxygen  · Electricity has been confirmed turned back on at home  · Continue respiratory protocol  · Continue Symbicort, Spiriva and p r n  albuterol        Obesity (BMI 30-39  9)   Assessment & Plan    · BMI: 35 4  · Encourage weight loss with diet and lifestyle modification          Discharging Physician / Practitioner: Stephanie Frias PA-C  PCP: Rafa Yoder MD  Admission Date:   Admission Orders     Ordered        09/11/18 2047  Place in Observation (expected length of stay for this patient is less than two midnights)  Once             Discharge Date: 09/12/18    Resolved Problems  Date Reviewed: 9/12/2018          Resolved    Elevated blood sugar 9/12/2018     Resolved by  Stephanie Frias PA-C    Hypoxia 9/12/2018     Resolved by  Stephanie Frias PA-C        Consultations During Hospital Stay:  · None    Procedures Performed:   · Chest xray (09/11/2018): Increased right costophrenic angle pleural thickening/small effusion  Minimal right base atelectasis  Significant Findings / Test Results:   · None    Incidental Findings:   · None     Test Results Pending at Discharge (will require follow up): · None     Outpatient Tests Requested:  · None    Complications:  Hyperglycemia/hypoglycemia    Reason for Admission: Shortness of breath    Hospital Course:     Elif Love  is a 72 y o  male patient who originally presented to the hospital on 9/11/2018 due to shortness of breath  Patient has underlying history of COPD with chronic oxygen use  Unfortunately, his electricity was shut off and he was without his oxygen for 3 hr  He developed shortness of breath and for this reason came to the emergency department  He was found to be hypoxic with sats in the 80s  He was placed back on his oxygen and monitored overnight with resolution of his shortness of breath  His electricity was turned back on at home and he was discharged home  He was found to be hyperglycemic upon admission  Patient reports taking extra dose of insulin at home because of this  The following morning (on day of discharge), the patient was found to be hypoglycemic but was asymptomatic  His morning dose of insulin was held    He will resume his regular regimen of insulin  Please see above list of diagnoses and related plan for additional information  Condition at Discharge: stable     Discharge Day Visit / Exam:     * Please refer to separate progress note for these details *    Discussion with Family: None  Patient declined a call  Discharge instructions/Information to patient and family:   See after visit summary for information provided to patient and family  Provisions for Follow-Up Care:  See after visit summary for information related to follow-up care and any pertinent home health orders  Disposition:     Home with [facility] Services (Reminder: Complete face to face encounter)    For Discharges to [facility]:   · Not Applicable to this Patient - Not Applicable to this Patient    Planned Readmission: No     Discharge Statement:  I spent 45 minutes discharging the patient  This time was spent on the day of discharge  I had direct contact with the patient on the day of discharge  Greater than 50% of the total time was spent examining patient, answering all patient questions, arranging and discussing plan of care with patient as well as directly providing post-discharge instructions  Additional time then spent on discharge activities  Discharge Medications:  See after visit summary for reconciled discharge medications provided to patient and family        ** Please Note: This note has been constructed using a voice recognition system **

## 2018-09-12 NOTE — ASSESSMENT & PLAN NOTE
· On chronic nasal cannula oxygen    · Electricity has been confirmed turned back on at home  · Continue respiratory protocol  · Continue Symbicort, Spiriva and p r n  albuterol

## 2018-09-12 NOTE — PROGRESS NOTES
Rounded with Ksenia Mc PA-C with [facility] plan for pt is to hold U-500 this AM due to low blood sugars and pt could be discharged today if power is restored at home

## 2018-09-12 NOTE — ASSESSMENT & PLAN NOTE
Lab Results   Component Value Date    HGBA1C 7 7 (H) 09/12/2018     Recent Labs      09/11/18   2156  09/11/18   2255  09/12/18   0807  09/12/18   0915   POCGLU  74  135  65  136     Blood Sugar Average: Last 72 hrs:  (P) 145 4     · Patient took an extra dose of U-500 yesterday, which may be cause of AM hypoglycemia today  Patient just completed breakfast   Will repeat blood sugar now  If BS > 150, will give AM insulin  · Home regimen: U-500 45 units @ 8AM, 45 units @ noon and 35 units @ 6PM   · Blood sugars at home range from 150-250  · Patient is fairly compliant with ADA diet at home  · Patient follows with Dr Kaycee Altamirano as an out-patient

## 2018-09-12 NOTE — PROGRESS NOTES
Progress Note - Emy Childers Sr  1945, 72 y o  male MRN: [id number]  Unit/Bed#: [facility] 929-01 Encounter: [id number]  Primary Care Provider: Cheryl Leyva MD   Date and time admitted to hospital: 9/11/2018  6:08 PM    * Oxygen dependent   Assessment & Plan    · Patient's baseline oxygen requirement:  3 L continuous nasal cannula  · Patient has electricity was turned off yesterday and after 3 hours he became short of breath  · Will confirm electricity is been turned on before patient can be discharged  · Currently patient is at baseline oxygen requirement  Insulin dependent diabetes mellitus    Assessment & Plan    Lab Results   Component Value Date    HGBA1C 7 7 (H) 09/12/2018     Recent Labs      09/11/18   1821  09/11/18   2156  09/11/18   2255  09/12/18   0807   POCGLU  317*  74  135  65     Blood Sugar Average: Last 72 hrs:  (P) 147 75     · Patient took an extra dose of U-500 yesterday, which may be cause of AM hypoglycemia today  Patient just completed breakfast   Will repeat blood sugar now  If BS > 150, will give AM insulin  · Home regimen: U-500 45 units @ 8AM, 45 units @ noon and 35 units @ 6PM   · Blood sugars at home range from 150-250  · Patient is fairly compliant with ADA diet at home  · Patient follows with Dr Rut Madrid as an out-patient  Chronic diastolic heart failure (HCC)   Assessment & Plan    · Chest x-ray:  Increased right costophrenic angle pleural thickening/small effusion  · Patient appears well compensated at this time  · Continue Lasix and metoprolol  Chronic kidney disease, stage 3   Assessment & Plan    · Baseline creatinine:  1 3-1 4        COPD (chronic obstructive pulmonary disease) (HCC)   Assessment & Plan    · On chronic nasal cannula oxygen  · Confirm electricity has been turned back on at home  · Continue respiratory protocol  · Continue Symbicort, Spiriva and p r n  albuterol        Obesity (BMI 30-39  9)   Assessment & Plan    · BMI: 35 4  · Encourage weight loss with diet and lifestyle modification          VTE Pharmacologic Prophylaxis:   Pharmacologic: Enoxaparin (Lovenox)  Mechanical: Mechanical VTE prophylaxis in place  Patient Centered Rounds: I have performed bedside rounds with nursing staff today  Discussions with Specialists or Other Care Team Provider: Case management  Education and Discussions with Family / Patient: All patient questions answered to the best of my ability  Time Spent for Care: 20 minutes  More than 50% of total time spent on counseling and coordination of care as described above  Current Length of Stay: 0 day(s)  Current Patient Status: Observation   Certification Statement: Continue observation status  Discharge Plan:  Anticipate discharge home later today after we confirm patient's electricity has been turned back on  Code Status: Level 1 - Full Code    Subjective:   Patient is feeling much better overall now that he is back on his oxygen  He does not know if his electricity is been turned on yet  He states at home his diabetes is fairly well controlled and his blood sugars range from 150-250  He follows up with Dr Gwendolyn Hancock as an outpatient  We reviewed his home insulin regimen  Objective:   Vitals:   Temp (24hrs), Av 2 °F (36 8 °C), Min:97 8 °F (36 6 °C), Max:98 8 °F (37 1 °C)    HR:  [] 81  Resp:  [18-20] 18  BP: (125-162)/(56-84) 139/62  SpO2:  [92 %-98 %] 92 %  Body mass index is 35 4 kg/m²  Input and Output Summary (last 24 hours): Intake/Output Summary (Last 24 hours) at 18 0912  Last data filed at 18 0653   Gross per 24 hour   Intake              340 ml   Output                0 ml   Net              340 ml       Physical Exam:     Physical Exam   Constitutional: Vital signs are normal  He is cooperative  Nasal cannula in place  HENT:   Head: Normocephalic and atraumatic     Mouth/Throat: Oropharynx is clear and moist and mucous membranes are normal    Eyes: No scleral icterus  Cardiovascular: Normal rate and regular rhythm  No murmur heard  Pulmonary/Chest: He has decreased breath sounds  He has no wheezes  He has no rales  He exhibits no tenderness  Abdominal: Soft  Bowel sounds are normal  He exhibits no distension  There is no tenderness  Musculoskeletal: Normal range of motion  He exhibits no edema  Neurological: He is alert  Skin: Skin is warm and dry  No rash noted  Psychiatric: He has a normal mood and affect  Vitals reviewed  Additional Data:   Labs:    Results from last 7 days  Lab Units 09/11/18  1854   WBC Thousand/uL 10 57*   HEMOGLOBIN g/dL 11 8*   HEMATOCRIT % 36 1*   PLATELETS Thousands/uL 148*   NEUTROS PCT % 82*   LYMPHS PCT % 11*   MONOS PCT % 6   EOS PCT % 1       Results from last 7 days  Lab Units 09/11/18  1854   SODIUM mmol/L 134*   POTASSIUM mmol/L 4 7   CHLORIDE mmol/L 97*   CO2 mmol/L 31   BUN mg/dL 14   CREATININE mg/dL 1 30   CALCIUM mg/dL 9 4           * I Have Reviewed All Lab Data Listed Above  * Additional Pertinent Lab Tests Reviewed: All Labs Within Last 24 Hours Reviewed    Imaging:    Imaging Reports Reviewed Today Include: Chest xray    Cultures:   Blood Culture:   Lab Results   Component Value Date    BLOODCX No Growth After 5 Days  06/20/2018    BLOODCX No Growth After 5 Days  06/20/2018    BLOODCX No Growth After 5 Days   06/19/2018    BLOODCX Staphylococcus coagulase negative (A) 06/19/2018     Urine Culture:   Lab Results   Component Value Date    URINECX >100,000 cfu/ml Staphylococcus coagulase negative 12/03/2016     Sputum Culture: No components found for: SPUTUMCX  Wound Culture: No results found for: WOUNDCULT    Last 24 Hours Medication List:     Current Facility-Administered Medications:  acetaminophen 650 mg Oral Q6H PRN Kel Wallace MD   albuterol 2 5 mg Nebulization Q6H PRN Kel Wallace MD   aspirin 81 mg Oral Daily Kel Wallace MD   budesonide-formoterol 2 puff Inhalation BID Melida Morin MD   enoxaparin 40 mg Subcutaneous Daily Melida Morin MD   ferrous sulfate 325 mg Oral Daily With Breakfast Melida Morin MD   fish oil 1,000 mg Oral BID Melida Morin MD   fluticasone 1 spray Nasal Daily Melida Morin MD   furosemide 40 mg Oral BID Melida Morin MD   insulin lispro 1-5 Units Subcutaneous TID Kimberly Joseph MD   insulin lispro 1-5 Units Subcutaneous HS Melida Morin MD   insulin regular 45 Units Subcutaneous BID before breakfast/lunch Melida Morin MD   metoprolol tartrate 25 mg Oral BID Melida Morin MD   oxyCODONE-acetaminophen 1 tablet Oral Q4H PRN Melida Morin MD   pantoprazole 40 mg Oral BID AC Melida Morin MD   potassium chloride 20 mEq Oral BID Melida Morin MD   pravastatin 80 mg Oral Daily With Selina Melgar MD   tamsulosin 0 4 mg Oral Daily With Selina Melgar MD   tiotropium 18 mcg Inhalation Daily Melida Morin MD        Today, Patient Was Seen By: Fredy Miller PA-C    ** Please Note: Dragon 360 Dictation voice to text software may have been used in the creation of this document   **

## 2018-09-12 NOTE — ASSESSMENT & PLAN NOTE
· Patient's baseline oxygen requirement:  3 L continuous nasal cannula  · Patient has electricity was turned off yesterday and after 3 hours he became short of breath  · Will confirm electricity is been turned on before patient can be discharged---confirmed  · Currently patient is at baseline oxygen requirement

## 2018-09-12 NOTE — H&P
History and Physical - [address] [place] [address] Internal Medicine    Patient Information: Merline Sans  72 y o  male MRN: [id number]  Unit/Bed#: [facility] 929-01 Encounter: [id number]  Admitting Physician: Ramsey Barrow MD  PCP: Nigel Hannon MD  Date of Admission:  09/11/18    Assessment/Plan:    Hospital Problem List:     Principal Problem:    Elevated blood sugar  Active Problems:    Obesity (BMI 30-39  9)    COPD (chronic obstructive pulmonary disease) (HCC)    Chronic kidney disease, stage 3    Essential hypertension    Hypoxia    Present on Admission:   Elevated blood sugar   Obesity (BMI 30-39  9)   COPD (chronic obstructive pulmonary disease) (HCC)   Chronic kidney disease, stage 3   Essential hypertension      Plan for the Primary Problem(s):  · Poorly controlled diabetes mellitus inpatient with known history of diabetes and long-term insulin use  · Assigned observation status for further workup and treatment, unclear of patient compliance with insulin and diet, will place on outpatient insulin regimen along with correctional insulin  Will check hemoglobin A1c, place on carb consistent diet and monitor blood sugars closely  Will make adjustments to regimen as needed    Plan for Additional Problems:   · COPD with chronic O2 use:  Patient electricity was shut off and he he is hypoxic at baseline without supplemental oxygen  Patient reports that he will be able to have is power turned back on tomorrow  His nephew lives with him and will be able to assist him with this    Continue long-acting beta agonist, inhaled corticosteroids, no evidence of acute exacerbation on exam  · Chronic stage 3 kidney disease:  Creatinine stable, outpatient monitoring  · Obesity:  Lifestyle modification, weight loss encouraged  · Chronic diastolic congestive heart failure:  No evidence of acute exacerbation on exam  · Hypertension:  Continue outpatient antihypertensive regimen, monitor hemodynamics    VTE Prophylaxis: Enoxaparin (Lovenox)  / sequential compression device   Code Status: full  POLST: There is no POLST form on file for this patient (pre-hospital)    Anticipated Length of Stay:  Patient will be admitted on an Observation basis with an anticipated length of stay of  less than 2 midnights  Justification for Hospital Stay:  Monitor blood sugars, social service consult as patient without power    Total Time for Visit, including Counseling / Coordination of Care: 45 minutes  Greater than 50% of this total time spent on direct patient counseling and coordination of care  Chief Complaint:   Shortness of breath    History of Present Illness:    Kirstie Benson  is a 72 y o  male who presents with history of COPD with chronic O2 use, diastolic congestive heart failure, obesity, hypertension, hyperlipidemia presents emergency department with complaint of shortness of breath  Patient reports that his electricity was turned off earlier today and he was without oxygen throughout the day  He came to the emergency department for shortness of breath  He states that he will be able to pay his electric bill tomorrow and have his services restored  He denies chest pain, lightheadedness or palpitations  He reports that he is chronically short of breath  Per ED records patient hypoxic at baseline with O2 sats in the low 80s without oxygen             Review of Systems:  No fever, chills or sweats  No change in urine or bowel habits  No chest pain, lightheadedness or palpitations  Chronically short of breath    All systems are reviewed  Positive as per history of presenting illness  Patient answered no to all other questions         Past Medical and Surgical History:     Past Medical History:   Diagnosis Date    Abdominal pain     Cardiac disease     [facility]est[facility] [address]     [facility]     Coronary artery disease     [facility]     Hyperlipidemia     Hypertension     MI (myocardial infarction) ([facility] [address] )     with 3 stents    Prostate cancer [facility])        Past Surgical History:   Procedure Laterality Date    ABDOMINAL SURGERY      exploratory    ANGIOPLASTY      3 stents    ESOPHAGOGASTRODUODENOSCOPY N/A 10/2/2017    Procedure: ESOPHAGOGASTRODUODENOSCOPY (EGD); Surgeon: Radha Steen MD;  Location: [facility]; Service: Gastroenterology    PROSTATE SURGERY         Meds/Allergies:    Prior to Admission medications    Medication Sig Start Date End Date Taking?  Authorizing Provider   aspirin (ECOTRIN LOW STRENGTH) 81 mg EC tablet Take 1 tablet by mouth daily 12/6/16   Historical Provider, MD   budesonide-formoterol (SYMBICORT) 160-4 5 mcg/act inhaler Inhale 2 puffs 2 (two) times a day    Historical Provider, MD   ferrous sulfate 325 (65 Fe) mg tablet Take 325 mg by mouth daily with breakfast    Historical Provider, MD   fluticasone (FLONASE) 50 mcg/act nasal spray 1 spray into each nostril daily 2/13/18   Lucia Christie MD   furosemide (LASIX) 40 mg tablet Take 1 tablet (40 mg total) by mouth 2 (two) times a day 7/12/18   Papito Smith MD   insulin regular (HumuLIN R U-500) 500 units/mL CONCENTRATED injection Inject 0 09 mL (45 Units total) under the skin 2 (two) times a day before breakfast and lunch 2/12/18   Lucia Christie MD   Insulin Syringe/Needle U-500 (BD INSULIN SYRINGE U-500) 31G X 6MM 0 5 ML MISC by Does not apply route 8/15/17   Historical Provider, MD   levalbuterol (XOPENEX) 1 25 mg/3 mL nebulizer solution USE 1 VIAL IN NEBULIZER THREE TIMES A DAY 4/4/18   Historical Provider, MD   metoprolol tartrate (LOPRESSOR) 25 mg tablet Take 1 tablet by mouth 2 (two) times a day    Historical Provider, MD   Omega-3 Fatty Acids (FISH OIL) 645 MG CAPS Take 1 capsule by mouth 2 (two) times a day    Historical Provider, MD   oxyCODONE-acetaminophen (PERCOCET) 7 5-325 MG per tablet Take by mouth every 4 (four) hours 3/21/17   Historical Provider, MD   pantoprazole (PROTONIX) 40 mg tablet Take 1 tablet by mouth 2 (two) times a day before meals 10/5/17   Joe Aguilar MD   potassium chloride (K-DUR,KLOR-CON) 20 mEq tablet Take 1 tablet (20 mEq total) by mouth 2 (two) times a day 2/12/18   Emerita Henry MD   simvastatin (ZOCOR) 40 mg tablet Take 40 mg by mouth daily 3/15/18   Historical Provider, MD   tamsulosin (FLOMAX) 0 4 mg Take 1 capsule (0 4 mg total) by mouth daily with dinner 5/26/18   Brain Sawyer DO   tiotropium [facility]) 18 mcg inhalation capsule Place 18 mcg into inhaler and inhale daily    Historical Provider, MD     I have reviewed home medications using allscripts  Allergies: Allergies   Allergen Reactions    Metformin GI Intolerance       Social History:     Marital Status: /Civil Union     Substance Use History:   History   Alcohol Use No     History   Smoking Status    Former Smoker    Packs/day: 1 50    Years: 50 00    Quit date: 9/13/2017   Smokeless Tobacco    Never Used     History   Drug Use No       Family History:    non-contributory      Physical Exam:        Vitals:   Blood Pressure: 162/84 (09/11/18 2133)  Pulse: 80 (09/11/18 2133)  Temperature: 97 8 °F (36 6 °C) (09/11/18 2133)  Temp Source: Oral (09/11/18 2133)  Respirations: 19 (09/11/18 2133)  Height: 6' (182 9 cm) (09/11/18 2133)  Weight - Scale: 118 kg (261 lb) (09/11/18 2133)  SpO2: 97 % (09/11/18 2133)    Vital signs are reviewed as above  Constitutional:  Patient in bed  Patient laying comfortably  HEENT:  Atraumatic, normocephalic, sclera anicteric, moist mucous membranes  Cardiovascular:  Regular rate rhythm  Pulmonary:  Decreased exchange bilaterally, no active wheezes, no rales  Abdomen:  Soft, obese, positive bowel sounds, nontender, nondistended  Extremities:  Chronic stasis changes, trace lower extremity edema  Neuro:  Oriented x3, grossly nonfocal exam  Vascular:  Distal pulses intact  Psychiatry:  Calm and cooperative      Additional Data:     Lab Results:  I have personally reviewed pertinent reports  Results from last 7 days  Lab Units 09/11/18  1854   WBC Thousand/uL 10 57*   HEMOGLOBIN g/dL 11 8*   HEMATOCRIT % 36 1*   PLATELETS Thousands/uL 148*   NEUTROS PCT % 82*   LYMPHS PCT % 11*   MONOS PCT % 6   EOS PCT % 1       Results from last 7 days  Lab Units 09/11/18  1854   SODIUM mmol/L 134*   POTASSIUM mmol/L 4 7   CHLORIDE mmol/L 97*   CO2 mmol/L 31   BUN mg/dL 14   CREATININE mg/dL 1 30   CALCIUM mg/dL 9 4           Imaging: I have personally reviewed pertinent reports  No results found  Allscripts Records Reviewed: Yes     ** Please Note: Dragon 360 Dictation voice to text software may have been used in the creation of this document   **

## 2018-09-12 NOTE — ASSESSMENT & PLAN NOTE
· On chronic nasal cannula oxygen    · Confirm electricity has been turned back on at home  · Continue respiratory protocol  · Continue Symbicort, Spiriva and p r n  albuterol

## 2018-09-12 NOTE — RESPIRATORY THERAPY NOTE
RT Protocol Note  Marce Calzada  72 y o  male MRN: [id number]  [facility] Encounter: [id number]    Assessment    Principal Problem:    Elevated blood sugar  Active Problems:    Obesity (BMI 30-39  9)    COPD (chronic obstructive pulmonary disease) ([facility])    Chronic kidney disease, stage 3    Essential hypertension    Hypoxia      Home Pulmonary Medications:         Past Medical History:   Diagnosis Date    Abdominal pain     Cardiac disease     CHF (congestive heart failure) ([facility])     COPD (chronic obstructive pulmonary disease) ([place] [address] )     Coronary artery disease     Diabetes mellitus ([place] [address] )     Hyperlipidemia     Hypertension     MI (myocardial infarction) ([place] [address] )     with 3 stents    Prostate cancer ([place] [address] )      Social History     Social History    Marital status: /Civil Union     Spouse name: N/A    Number of children: N/A    Years of education: N/A     Social History Main Topics    Smoking status: Former Smoker     Packs/day: 1 50     Years: 50 00     Quit date: 9/13/2017    Smokeless tobacco: Never Used    Alcohol use No    Drug use: No    Sexual activity: No     Other Topics Concern    None     Social History Narrative    None       Subjective         Objective    Physical Exam:   Assessment Type: (P) Assess only  Bilateral Breath Sounds: (P) Clear, Diminished  Cough: (P) None    Vitals:  Blood pressure 162/84, pulse (P) 72, temperature 97 8 °F (36 6 °C), temperature source Oral, resp  rate 19, height 6' (1 829 m), weight 118 kg (261 lb), SpO2 (P) 93 %  Imaging and other studies: I have personally reviewed pertinent reports  Plan    Respiratory Plan: (P) Home Bronchodilator Patient pathway        Resp Comments: (P) Pt  evaluated for resp protocol  BS=decreased and clear  Pt  in no distress on 3  5LPM O2  SpO2=93%  Pt  uses no UDNs at home  Pt  ordered on UDN txs PRN as needed  No further resp intervention is required at this time

## 2018-09-13 LAB — BACTERIA UR CULT: NORMAL
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
## 2018-10-22 ENCOUNTER — TELEPHONE (OUTPATIENT)
Dept: PULMONOLOGY | Facility: CLINIC | Age: 73
End: 2018-10-22
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
## 2018-10-26 ENCOUNTER — TELEPHONE (OUTPATIENT)
Dept: NEPHROLOGY | Facility: CLINIC | Age: 73
End: 2018-10-26

## 2018-10-26 NOTE — TELEPHONE ENCOUNTER
Called mr Jonny Mckinney to schedule his follow up apt with Dr Kelli Topete  There was no answer and the patient does not have his voicemail set up at this time

## 2018-12-02 ENCOUNTER — APPOINTMENT (EMERGENCY)
Dept: RADIOLOGY | Facility: HOSPITAL | Age: 73
End: 2018-12-02
Payer: COMMERCIAL

## 2018-12-02 ENCOUNTER — HOSPITAL ENCOUNTER (EMERGENCY)
Facility: HOSPITAL | Age: 73
Discharge: HOME | End: 2018-12-02
Attending: EMERGENCY MEDICINE | Admitting: EMERGENCY MEDICINE
Payer: COMMERCIAL

## 2018-12-02 VITALS
HEART RATE: 106 BPM | TEMPERATURE: 98.4 F | SYSTOLIC BLOOD PRESSURE: 156 MMHG | BODY MASS INDEX: 35.28 KG/M2 | DIASTOLIC BLOOD PRESSURE: 78 MMHG | OXYGEN SATURATION: 94 % | WEIGHT: 260.14 LBS | RESPIRATION RATE: 26 BRPM
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
## 2018-12-02 DIAGNOSIS — M25.562 LEFT KNEE PAIN: ICD-10-CM

## 2018-12-02 DIAGNOSIS — S09.90XA HEAD INJURY: ICD-10-CM

## 2018-12-02 LAB
ALBUMIN SERPL BCP-MCNC: 3.3 G/DL (ref 3.5–5)
ALP SERPL-CCNC: 95 U/L (ref 46–116)
ALT SERPL W P-5'-P-CCNC: 24 U/L (ref 12–78)
ANION GAP SERPL CALCULATED.3IONS-SCNC: 6 MMOL/L (ref 4–13)
AST SERPL W P-5'-P-CCNC: 25 U/L (ref 5–45)
BASOPHILS # BLD AUTO: 0.02 THOUSANDS/ΜL (ref 0–0.1)
BASOPHILS NFR BLD AUTO: 0 % (ref 0–1)
BILIRUB SERPL-MCNC: 0.51 MG/DL (ref 0.2–1)
BUN SERPL-MCNC: 27 MG/DL (ref 5–25)
CALCIUM SERPL-MCNC: 8.5 MG/DL (ref 8.3–10.1)
CHLORIDE SERPL-SCNC: 89 MMOL/L (ref 100–108)
CO2 SERPL-SCNC: 38 MMOL/L (ref 21–32)
CREAT SERPL-MCNC: 1.38 MG/DL (ref 0.6–1.3)
EOSINOPHIL # BLD AUTO: 0.02 THOUSAND/ΜL (ref 0–0.61)
EOSINOPHIL NFR BLD AUTO: 0 % (ref 0–6)
ERYTHROCYTE [DISTWIDTH] IN BLOOD BY AUTOMATED COUNT: 13.4 % (ref 11.6–15.1)
GFR SERPL CREATININE-BSD FRML MDRD: 51 ML/MIN/1.73SQ M
GLUCOSE SERPL-MCNC: 305 MG/DL (ref 65–140)
GLUCOSE SERPL-MCNC: 369 MG/DL (ref 65–140)
GLUCOSE SERPL-MCNC: 432 MG/DL (ref 65–140)
HCT VFR BLD AUTO: 42.2 % (ref 36.5–49.3)
HGB BLD-MCNC: 13.3 G/DL (ref 12–17)
IMM GRANULOCYTES # BLD AUTO: 0.05 THOUSAND/UL (ref 0–0.2)
IMM GRANULOCYTES NFR BLD AUTO: 1 % (ref 0–2)
INR PPP: 0.97 (ref 0.86–1.17)
LYMPHOCYTES # BLD AUTO: 0.49 THOUSANDS/ΜL (ref 0.6–4.47)
LYMPHOCYTES NFR BLD AUTO: 5 % (ref 14–44)
MCH RBC QN AUTO: 27.6 PG (ref 26.8–34.3)
MCHC RBC AUTO-ENTMCNC: 31.5 G/DL (ref 31.4–37.4)
MCV RBC AUTO: 88 FL (ref 82–98)
MONOCYTES # BLD AUTO: 0.54 THOUSAND/ΜL (ref 0.17–1.22)
MONOCYTES NFR BLD AUTO: 5 % (ref 4–12)
NEUTROPHILS # BLD AUTO: 8.97 THOUSANDS/ΜL (ref 1.85–7.62)
NEUTS SEG NFR BLD AUTO: 89 % (ref 43–75)
NRBC BLD AUTO-RTO: 0 /100 WBCS
PLATELET # BLD AUTO: 118 THOUSANDS/UL (ref 149–390)
PMV BLD AUTO: 11.2 FL (ref 8.9–12.7)
POTASSIUM SERPL-SCNC: 4.8 MMOL/L (ref 3.5–5.3)
PROT SERPL-MCNC: 8.2 G/DL (ref 6.4–8.2)
PROTHROMBIN TIME: 13 SECONDS (ref 11.8–14.2)
RBC # BLD AUTO: 4.82 MILLION/UL (ref 3.88–5.62)
SODIUM SERPL-SCNC: 133 MMOL/L (ref 136–145)
WBC # BLD AUTO: 10.09 THOUSAND/UL (ref 4.31–10.16)

## 2018-12-02 PROCEDURE — 72125 CT NECK SPINE W/O DYE: CPT

## 2018-12-02 PROCEDURE — 99285 EMERGENCY DEPT VISIT HI MDM: CPT

## 2018-12-02 PROCEDURE — 85610 PROTHROMBIN TIME: CPT | Performed by: EMERGENCY MEDICINE

## 2018-12-02 PROCEDURE — 36415 COLL VENOUS BLD VENIPUNCTURE: CPT | Performed by: EMERGENCY MEDICINE

## 2018-12-02 PROCEDURE — 82948 REAGENT STRIP/BLOOD GLUCOSE: CPT

## 2018-12-02 PROCEDURE — 70450 CT HEAD/BRAIN W/O DYE: CPT

## 2018-12-02 PROCEDURE — 80053 COMPREHEN METABOLIC PANEL: CPT | Performed by: EMERGENCY MEDICINE

## 2018-12-02 PROCEDURE — 71046 X-RAY EXAM CHEST 2 VIEWS: CPT

## 2018-12-02 PROCEDURE — 73590 X-RAY EXAM OF LOWER LEG: CPT

## 2018-12-02 PROCEDURE — 73560 X-RAY EXAM OF KNEE 1 OR 2: CPT

## 2018-12-02 PROCEDURE — 85025 COMPLETE CBC W/AUTO DIFF WBC: CPT | Performed by: EMERGENCY MEDICINE

## 2018-12-02 PROCEDURE — 93005 ELECTROCARDIOGRAM TRACING: CPT

## 2018-12-02 PROCEDURE — 96372 THER/PROPH/DIAG INJ SC/IM: CPT

## 2018-12-02 PROCEDURE — 96361 HYDRATE IV INFUSION ADD-ON: CPT

## 2018-12-02 PROCEDURE — 96360 HYDRATION IV INFUSION INIT: CPT

## 2018-12-02 RX ORDER — IPRATROPIUM BROMIDE AND ALBUTEROL SULFATE 2.5; .5 MG/3ML; MG/3ML
3 SOLUTION RESPIRATORY (INHALATION)
Status: DISCONTINUED | OUTPATIENT
Start: 2018-12-02 | End: 2018-12-03 | Stop reason: HOSPADM

## 2018-12-02 RX ADMIN — INSULIN HUMAN 10 UNITS: 100 INJECTION, SOLUTION PARENTERAL at 20:18

## 2018-12-02 RX ADMIN — SODIUM CHLORIDE 1000 ML: 0.9 INJECTION, SOLUTION INTRAVENOUS at 20:25

## 2018-12-02 RX ADMIN — IPRATROPIUM BROMIDE AND ALBUTEROL SULFATE 3 ML: 2.5; .5 SOLUTION RESPIRATORY (INHALATION) at 19:39
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
## 2018-12-03 LAB
ATRIAL RATE: 115 BPM
P AXIS: 88 DEGREES
QRS AXIS: 98 DEGREES
QRSD INTERVAL: 122 MS
QT INTERVAL: 354 MS
QTC INTERVAL: 485 MS
T WAVE AXIS: 36 DEGREES
VENTRICULAR RATE: 113 BPM

## 2018-12-03 PROCEDURE — 93010 ELECTROCARDIOGRAM REPORT: CPT | Performed by: INTERNAL MEDICINE

## 2018-12-03 NOTE — DISCHARGE INSTRUCTIONS
Head Injury   WHAT YOU NEED TO KNOW:   A head injury is most often caused by a blow to the head  This may occur from a fall, bicycle injury, sports injury, being struck in the head, or a motor vehicle accident  DISCHARGE INSTRUCTIONS:   Call 911 or have someone else call for any of the following:   · You cannot be woken  · You have a seizure  · You stop responding to others or you faint  · You have blurry or double vision  · Your speech becomes slurred or confused  · You have arm or leg weakness, loss of feeling, or new problems with coordination  · Your pupils are larger than usual or one pupil is a different size than the other  · You have blood or clear fluid coming out of your ears or nose  Return to the emergency department if:   · You have repeated or forceful vomiting  · You feel confused  · Your headache gets worse or becomes severe  · You or someone caring for you notices that you are harder to wake than usual   Contact your healthcare provider if:   · Your symptoms last longer than 6 weeks after the injury  · You have questions or concerns about your condition or care  Medicines:   · Acetaminophen  decreases pain  Acetaminophen is available without a doctor's order  Ask how much to take and how often to take it  Follow directions  Acetaminophen can cause liver damage if not taken correctly  · Take your medicine as directed  Contact your healthcare provider if you think your medicine is not helping or if you have side effects  Tell him or her if you are allergic to any medicine  Keep a list of the medicines, vitamins, and herbs you take  Include the amounts, and when and why you take them  Bring the list or the pill bottles to follow-up visits  Carry your medicine list with you in case of an emergency  Self-care:   · Rest  or do quiet activities for 24 to 48 hours  Limit your time watching TV, using the computer, or doing tasks that require a lot of thinking  Slowly return to your normal activities as directed  Do not play sports or do activities that may cause you to get hit in the head  Ask your healthcare provider when you can return to sports  · Apply ice  on your head for 15 to 20 minutes every hour or as directed  Use an ice pack, or put crushed ice in a plastic bag  Cover it with a towel before you apply it to your skin  Ice helps prevent tissue damage and decreases swelling and pain  · Have someone stay with you for 24 hours  or as directed  This person can monitor you for complications and call [phone]  When you are awake the person should ask you a few questions to see if you are thinking clearly  An example would be to ask your name or your address  Prevent another head injury:   · Wear a helmet that fits properly  Do this when you play sports, or ride a bike, scooter, or skateboard  Helmets help decrease your risk of a serious head injury  Talk to your healthcare provider about other ways you can protect yourself if you play sports  · Wear your seat belt every time you are in a car  This helps to decrease your risk for a head injury if you are in a car accident  Follow up with your healthcare provider as directed:  Write down your questions so you remember to ask them during your visits  © 2017 [employer] Information is for End User's use only and may not be sold, redistributed or otherwise used for commercial purposes  All illustrations and images included in CareNotes® are the copyrighted property of A [employer]  or Onofre Carroll  The above information is an  only  It is not intended as medical advice for individual conditions or treatments  Talk to your doctor, nurse or pharmacist before following any medical regimen to see if it is safe and effective for you

## 2018-12-06 ENCOUNTER — OFFICE VISIT (OUTPATIENT)
Dept: NEPHROLOGY | Facility: CLINIC | Age: 73
End: 2018-12-06
Payer: COMMERCIAL

## 2018-12-06 VITALS
HEIGHT: 72 IN | WEIGHT: 248 LBS | DIASTOLIC BLOOD PRESSURE: 70 MMHG | SYSTOLIC BLOOD PRESSURE: 130 MMHG | BODY MASS INDEX: 33.59 KG/M2

## 2018-12-06 DIAGNOSIS — R80.1 PERSISTENT PROTEINURIA: ICD-10-CM

## 2018-12-06 DIAGNOSIS — E87.1 HYPONATREMIA: ICD-10-CM

## 2018-12-06 DIAGNOSIS — I12.9 BENIGN HYPERTENSION WITH CKD (CHRONIC KIDNEY DISEASE) STAGE III (HCC): ICD-10-CM

## 2018-12-06 DIAGNOSIS — N18.30 CHRONIC KIDNEY DISEASE, STAGE 3 (HCC): Primary | Chronic | ICD-10-CM

## 2018-12-06 DIAGNOSIS — I50.33 ACUTE ON CHRONIC DIASTOLIC (CONGESTIVE) HEART FAILURE (HCC): ICD-10-CM

## 2018-12-06 DIAGNOSIS — J96.12 CHRONIC RESPIRATORY FAILURE WITH HYPOXIA AND HYPERCAPNIA (HCC): Chronic | ICD-10-CM

## 2018-12-06 DIAGNOSIS — J96.11 CHRONIC RESPIRATORY FAILURE WITH HYPOXIA AND HYPERCAPNIA (HCC): Chronic | ICD-10-CM

## 2018-12-06 DIAGNOSIS — N18.30 BENIGN HYPERTENSION WITH CKD (CHRONIC KIDNEY DISEASE) STAGE III (HCC): ICD-10-CM

## 2018-12-06 PROCEDURE — 99214 OFFICE O/P EST MOD 30 MIN: CPT | Performed by: INTERNAL MEDICINE

## 2018-12-06 RX ORDER — CYCLOBENZAPRINE HCL 10 MG
10 TABLET ORAL 2 TIMES DAILY PRN
COMMUNITY
End: 2020-04-17

## 2018-12-06 RX ORDER — POTASSIUM CHLORIDE 20 MEQ/1
20 TABLET, EXTENDED RELEASE ORAL DAILY
Qty: 60 TABLET | Refills: 0 | Status: SHIPPED | OUTPATIENT
Start: 2018-12-06 | End: 2020-05-26

## 2018-12-06 RX ORDER — FUROSEMIDE 40 MG/1
TABLET ORAL
Qty: 60 TABLET | Refills: 0 | Status: SHIPPED | OUTPATIENT
Start: 2018-12-06 | End: 2019-06-17 | Stop reason: HOSPADM
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
## 2018-12-06 NOTE — PATIENT INSTRUCTIONS
-decrease Lasix to 80 mg alternating with 40 mg on alternate days  -continue to monitor weight and if you gain greater than 5 lb in 2 to 3 days please call back to office
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
## 2018-12-07 ENCOUNTER — TELEPHONE (OUTPATIENT)
Dept: CARDIOLOGY CLINIC | Facility: CLINIC | Age: 73
End: 2018-12-07
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
## 2019-02-18 DIAGNOSIS — Z79.4 TYPE 2 DIABETES MELLITUS WITH HYPERGLYCEMIA, WITH LONG-TERM CURRENT USE OF INSULIN (HCC): Primary | Chronic | ICD-10-CM

## 2019-02-18 DIAGNOSIS — E11.65 TYPE 2 DIABETES MELLITUS WITH HYPERGLYCEMIA, WITH LONG-TERM CURRENT USE OF INSULIN (HCC): Primary | Chronic | ICD-10-CM

## 2019-02-20 RX ORDER — SYRINGE,INSUL U-500,NDL,0.5ML 31GX15/64"
SYRINGE, EMPTY DISPOSABLE MISCELLANEOUS
Qty: 100 EACH | Refills: 5 | Status: SHIPPED | OUTPATIENT
Start: 2019-02-20 | End: 2019-09-03 | Stop reason: SDUPTHER

## 2019-06-14 ENCOUNTER — HOSPITAL ENCOUNTER (INPATIENT)
Facility: HOSPITAL | Age: 74
LOS: 3 days | Discharge: HOME HEALTH | DRG: 189 | End: 2019-06-17
Attending: EMERGENCY MEDICINE | Admitting: INTERNAL MEDICINE
Payer: COMMERCIAL

## 2019-06-14 ENCOUNTER — APPOINTMENT (EMERGENCY)
Dept: RADIOLOGY | Facility: HOSPITAL | Age: 74
DRG: 189 | End: 2019-06-14
Payer: COMMERCIAL

## 2019-06-14 DIAGNOSIS — R07.9 CHEST PAIN: ICD-10-CM

## 2019-06-14 DIAGNOSIS — R06.00 DYSPNEA: ICD-10-CM

## 2019-06-14 DIAGNOSIS — E11.65 TYPE 2 DIABETES MELLITUS WITH HYPERGLYCEMIA, WITH LONG-TERM CURRENT USE OF INSULIN (HCC): Chronic | ICD-10-CM

## 2019-06-14 DIAGNOSIS — J44.1 COPD WITH ACUTE EXACERBATION (HCC): ICD-10-CM

## 2019-06-14 DIAGNOSIS — I50.33 ACUTE ON CHRONIC DIASTOLIC (CONGESTIVE) HEART FAILURE (HCC): ICD-10-CM

## 2019-06-14 DIAGNOSIS — Z79.4 TYPE 2 DIABETES MELLITUS WITH HYPERGLYCEMIA, WITH LONG-TERM CURRENT USE OF INSULIN (HCC): Chronic | ICD-10-CM

## 2019-06-14 DIAGNOSIS — J44.1 COPD EXACERBATION (HCC): Primary | ICD-10-CM

## 2019-06-14 PROBLEM — E87.5 HYPERKALEMIA: Status: ACTIVE | Noted: 2019-06-14

## 2019-06-14 PROBLEM — J96.21 ACUTE ON CHRONIC RESPIRATORY FAILURE WITH HYPOXIA (HCC): Status: ACTIVE | Noted: 2019-06-14

## 2019-06-14 LAB
ALBUMIN SERPL BCP-MCNC: 3.6 G/DL (ref 3.5–5)
ALP SERPL-CCNC: 101 U/L (ref 46–116)
ALT SERPL W P-5'-P-CCNC: 29 U/L (ref 12–78)
ANION GAP SERPL CALCULATED.3IONS-SCNC: 6 MMOL/L (ref 4–13)
ARTERIAL PATENCY WRIST A: YES
AST SERPL W P-5'-P-CCNC: 33 U/L (ref 5–45)
ATRIAL RATE: 114 BPM
BASE EXCESS BLDA CALC-SCNC: -2 MMOL/L
BASOPHILS # BLD AUTO: 0.03 THOUSANDS/ΜL (ref 0–0.1)
BASOPHILS NFR BLD AUTO: 0 % (ref 0–1)
BILIRUB SERPL-MCNC: 0.47 MG/DL (ref 0.2–1)
BUN SERPL-MCNC: 13 MG/DL (ref 5–25)
CALCIUM SERPL-MCNC: 9.1 MG/DL (ref 8.3–10.1)
CHLORIDE SERPL-SCNC: 98 MMOL/L (ref 100–108)
CO2 SERPL-SCNC: 34 MMOL/L (ref 21–32)
CREAT SERPL-MCNC: 1.15 MG/DL (ref 0.6–1.3)
EOSINOPHIL # BLD AUTO: 0.06 THOUSAND/ΜL (ref 0–0.61)
EOSINOPHIL NFR BLD AUTO: 0 % (ref 0–6)
ERYTHROCYTE [DISTWIDTH] IN BLOOD BY AUTOMATED COUNT: 13.3 % (ref 11.6–15.1)
GFR SERPL CREATININE-BSD FRML MDRD: 64 ML/MIN/1.73SQ M
GLUCOSE SERPL-MCNC: 231 MG/DL (ref 65–140)
GLUCOSE SERPL-MCNC: 444 MG/DL (ref 65–140)
GLUCOSE SERPL-MCNC: 486 MG/DL (ref 65–140)
GLUCOSE SERPL-MCNC: >500 MG/DL (ref 65–140)
HCO3 BLDA-SCNC: 25.6 MMOL/L (ref 22–28)
HCT VFR BLD AUTO: 47.3 % (ref 36.5–49.3)
HGB BLD-MCNC: 14.8 G/DL (ref 12–17)
IMM GRANULOCYTES # BLD AUTO: 0.09 THOUSAND/UL (ref 0–0.2)
IMM GRANULOCYTES NFR BLD AUTO: 1 % (ref 0–2)
LYMPHOCYTES # BLD AUTO: 1.02 THOUSANDS/ΜL (ref 0.6–4.47)
LYMPHOCYTES NFR BLD AUTO: 6 % (ref 14–44)
MCH RBC QN AUTO: 27.7 PG (ref 26.8–34.3)
MCHC RBC AUTO-ENTMCNC: 31.3 G/DL (ref 31.4–37.4)
MCV RBC AUTO: 89 FL (ref 82–98)
MONOCYTES # BLD AUTO: 0.78 THOUSAND/ΜL (ref 0.17–1.22)
MONOCYTES NFR BLD AUTO: 5 % (ref 4–12)
NASAL CANNULA: 3
NEUTROPHILS # BLD AUTO: 15.14 THOUSANDS/ΜL (ref 1.85–7.62)
NEUTS SEG NFR BLD AUTO: 88 % (ref 43–75)
NRBC BLD AUTO-RTO: 0 /100 WBCS
NT-PROBNP SERPL-MCNC: 363 PG/ML
O2 CT BLDA-SCNC: 17.6 ML/DL (ref 16–23)
OXYHGB MFR BLDA: 89.8 % (ref 94–97)
P AXIS: 97 DEGREES
PCO2 BLDA: 55.8 MM HG (ref 36–44)
PH BLDA: 7.28 [PH] (ref 7.35–7.45)
PLATELET # BLD AUTO: 146 THOUSANDS/UL (ref 149–390)
PLATELET # BLD AUTO: 178 THOUSANDS/UL (ref 149–390)
PMV BLD AUTO: 10.8 FL (ref 8.9–12.7)
PMV BLD AUTO: 11.3 FL (ref 8.9–12.7)
PO2 BLDA: 69.2 MM HG (ref 75–129)
POTASSIUM SERPL-SCNC: 5.2 MMOL/L (ref 3.5–5.3)
POTASSIUM SERPL-SCNC: 5.6 MMOL/L (ref 3.5–5.3)
PROCALCITONIN SERPL-MCNC: 0.13 NG/ML
PROT SERPL-MCNC: 8.3 G/DL (ref 6.4–8.2)
QRS AXIS: 88 DEGREES
QRSD INTERVAL: 114 MS
QT INTERVAL: 338 MS
QTC INTERVAL: 465 MS
RBC # BLD AUTO: 5.34 MILLION/UL (ref 3.88–5.62)
SODIUM SERPL-SCNC: 138 MMOL/L (ref 136–145)
SPECIMEN SOURCE: ABNORMAL
T WAVE AXIS: 38 DEGREES
TROPONIN I SERPL-MCNC: <0.02 NG/ML
VENTRICULAR RATE: 114 BPM
WBC # BLD AUTO: 17.12 THOUSAND/UL (ref 4.31–10.16)

## 2019-06-14 PROCEDURE — 96365 THER/PROPH/DIAG IV INF INIT: CPT

## 2019-06-14 PROCEDURE — 84145 PROCALCITONIN (PCT): CPT | Performed by: EMERGENCY MEDICINE

## 2019-06-14 PROCEDURE — 82947 ASSAY GLUCOSE BLOOD QUANT: CPT

## 2019-06-14 PROCEDURE — 82948 REAGENT STRIP/BLOOD GLUCOSE: CPT

## 2019-06-14 PROCEDURE — 82803 BLOOD GASES ANY COMBINATION: CPT

## 2019-06-14 PROCEDURE — 82805 BLOOD GASES W/O2 SATURATION: CPT | Performed by: PHYSICIAN ASSISTANT

## 2019-06-14 PROCEDURE — 94760 N-INVAS EAR/PLS OXIMETRY 1: CPT

## 2019-06-14 PROCEDURE — 84132 ASSAY OF SERUM POTASSIUM: CPT | Performed by: PHYSICIAN ASSISTANT

## 2019-06-14 PROCEDURE — 83880 ASSAY OF NATRIURETIC PEPTIDE: CPT | Performed by: EMERGENCY MEDICINE

## 2019-06-14 PROCEDURE — 80053 COMPREHEN METABOLIC PANEL: CPT | Performed by: EMERGENCY MEDICINE

## 2019-06-14 PROCEDURE — 85014 HEMATOCRIT: CPT

## 2019-06-14 PROCEDURE — 82330 ASSAY OF CALCIUM: CPT

## 2019-06-14 PROCEDURE — 71045 X-RAY EXAM CHEST 1 VIEW: CPT

## 2019-06-14 PROCEDURE — 93005 ELECTROCARDIOGRAM TRACING: CPT

## 2019-06-14 PROCEDURE — 36415 COLL VENOUS BLD VENIPUNCTURE: CPT | Performed by: EMERGENCY MEDICINE

## 2019-06-14 PROCEDURE — 99223 1ST HOSP IP/OBS HIGH 75: CPT | Performed by: PHYSICIAN ASSISTANT

## 2019-06-14 PROCEDURE — 93010 ELECTROCARDIOGRAM REPORT: CPT | Performed by: INTERNAL MEDICINE

## 2019-06-14 PROCEDURE — 94640 AIRWAY INHALATION TREATMENT: CPT

## 2019-06-14 PROCEDURE — 36600 WITHDRAWAL OF ARTERIAL BLOOD: CPT

## 2019-06-14 PROCEDURE — 85049 AUTOMATED PLATELET COUNT: CPT | Performed by: PHYSICIAN ASSISTANT

## 2019-06-14 PROCEDURE — 99285 EMERGENCY DEPT VISIT HI MDM: CPT | Performed by: EMERGENCY MEDICINE

## 2019-06-14 PROCEDURE — 84484 ASSAY OF TROPONIN QUANT: CPT | Performed by: PHYSICIAN ASSISTANT

## 2019-06-14 PROCEDURE — 99285 EMERGENCY DEPT VISIT HI MDM: CPT

## 2019-06-14 PROCEDURE — 85025 COMPLETE CBC W/AUTO DIFF WBC: CPT | Performed by: EMERGENCY MEDICINE

## 2019-06-14 PROCEDURE — 84484 ASSAY OF TROPONIN QUANT: CPT | Performed by: EMERGENCY MEDICINE

## 2019-06-14 PROCEDURE — 84295 ASSAY OF SERUM SODIUM: CPT

## 2019-06-14 PROCEDURE — 84132 ASSAY OF SERUM POTASSIUM: CPT

## 2019-06-14 PROCEDURE — 96375 TX/PRO/DX INJ NEW DRUG ADDON: CPT

## 2019-06-14 RX ORDER — OXYCODONE HYDROCHLORIDE AND ACETAMINOPHEN 5; 325 MG/1; MG/1
1.5 TABLET ORAL EVERY 6 HOURS PRN
Status: DISCONTINUED | OUTPATIENT
Start: 2019-06-14 | End: 2019-06-17 | Stop reason: HOSPADM

## 2019-06-14 RX ORDER — PANTOPRAZOLE SODIUM 40 MG/1
40 TABLET, DELAYED RELEASE ORAL
Status: DISCONTINUED | OUTPATIENT
Start: 2019-06-14 | End: 2019-06-17 | Stop reason: HOSPADM

## 2019-06-14 RX ORDER — SODIUM CHLORIDE FOR INHALATION 0.9 %
3 VIAL, NEBULIZER (ML) INHALATION ONCE
Status: COMPLETED | OUTPATIENT
Start: 2019-06-14 | End: 2019-06-14

## 2019-06-14 RX ORDER — FERROUS SULFATE 325(65) MG
325 TABLET ORAL
Status: DISCONTINUED | OUTPATIENT
Start: 2019-06-15 | End: 2019-06-17 | Stop reason: HOSPADM

## 2019-06-14 RX ORDER — PRAVASTATIN SODIUM 80 MG/1
80 TABLET ORAL
Status: DISCONTINUED | OUTPATIENT
Start: 2019-06-14 | End: 2019-06-17 | Stop reason: HOSPADM

## 2019-06-14 RX ORDER — CALCIUM CARBONATE 200(500)MG
1000 TABLET,CHEWABLE ORAL DAILY PRN
Status: DISCONTINUED | OUTPATIENT
Start: 2019-06-14 | End: 2019-06-17 | Stop reason: HOSPADM

## 2019-06-14 RX ORDER — MAGNESIUM SULFATE HEPTAHYDRATE 40 MG/ML
2 INJECTION, SOLUTION INTRAVENOUS ONCE
Status: COMPLETED | OUTPATIENT
Start: 2019-06-14 | End: 2019-06-14

## 2019-06-14 RX ORDER — FUROSEMIDE 40 MG/1
40 TABLET ORAL DAILY
Status: DISCONTINUED | OUTPATIENT
Start: 2019-06-14 | End: 2019-06-15

## 2019-06-14 RX ORDER — FUROSEMIDE 40 MG/1
TABLET ORAL
Qty: 60 TABLET | Refills: 4 | Status: SHIPPED | OUTPATIENT
Start: 2019-06-14 | End: 2020-06-11

## 2019-06-14 RX ORDER — ASPIRIN 81 MG/1
81 TABLET ORAL DAILY
Status: DISCONTINUED | OUTPATIENT
Start: 2019-06-15 | End: 2019-06-17 | Stop reason: HOSPADM

## 2019-06-14 RX ORDER — FUROSEMIDE 40 MG/1
40 TABLET ORAL DAILY
Status: DISCONTINUED | OUTPATIENT
Start: 2019-06-15 | End: 2019-06-14

## 2019-06-14 RX ORDER — METHYLPREDNISOLONE SODIUM SUCCINATE 40 MG/ML
40 INJECTION, POWDER, LYOPHILIZED, FOR SOLUTION INTRAMUSCULAR; INTRAVENOUS EVERY 8 HOURS SCHEDULED
Status: DISCONTINUED | OUTPATIENT
Start: 2019-06-14 | End: 2019-06-16

## 2019-06-14 RX ORDER — ONDANSETRON 2 MG/ML
4 INJECTION INTRAMUSCULAR; INTRAVENOUS EVERY 6 HOURS PRN
Status: DISCONTINUED | OUTPATIENT
Start: 2019-06-14 | End: 2019-06-17 | Stop reason: HOSPADM

## 2019-06-14 RX ORDER — ALBUTEROL SULFATE 90 UG/1
2 AEROSOL, METERED RESPIRATORY (INHALATION) EVERY 4 HOURS PRN
Status: DISCONTINUED | OUTPATIENT
Start: 2019-06-14 | End: 2019-06-17 | Stop reason: HOSPADM

## 2019-06-14 RX ORDER — CYCLOBENZAPRINE HCL 10 MG
10 TABLET ORAL 2 TIMES DAILY PRN
Status: DISCONTINUED | OUTPATIENT
Start: 2019-06-14 | End: 2019-06-17 | Stop reason: HOSPADM

## 2019-06-14 RX ORDER — METHYLPREDNISOLONE SODIUM SUCCINATE 125 MG/2ML
125 INJECTION, POWDER, LYOPHILIZED, FOR SOLUTION INTRAMUSCULAR; INTRAVENOUS ONCE
Status: COMPLETED | OUTPATIENT
Start: 2019-06-14 | End: 2019-06-14

## 2019-06-14 RX ORDER — IPRATROPIUM BROMIDE AND ALBUTEROL SULFATE 2.5; .5 MG/3ML; MG/3ML
3 SOLUTION RESPIRATORY (INHALATION)
Status: DISCONTINUED | OUTPATIENT
Start: 2019-06-14 | End: 2019-06-14

## 2019-06-14 RX ORDER — BUDESONIDE AND FORMOTEROL FUMARATE DIHYDRATE 160; 4.5 UG/1; UG/1
2 AEROSOL RESPIRATORY (INHALATION) 2 TIMES DAILY
Status: DISCONTINUED | OUTPATIENT
Start: 2019-06-14 | End: 2019-06-17 | Stop reason: HOSPADM

## 2019-06-14 RX ORDER — HEPARIN SODIUM 5000 [USP'U]/ML
5000 INJECTION, SOLUTION INTRAVENOUS; SUBCUTANEOUS EVERY 8 HOURS SCHEDULED
Status: DISCONTINUED | OUTPATIENT
Start: 2019-06-14 | End: 2019-06-17 | Stop reason: HOSPADM

## 2019-06-14 RX ORDER — DOCUSATE SODIUM 100 MG/1
100 CAPSULE, LIQUID FILLED ORAL 2 TIMES DAILY
Status: DISCONTINUED | OUTPATIENT
Start: 2019-06-14 | End: 2019-06-17 | Stop reason: HOSPADM

## 2019-06-14 RX ORDER — ALBUTEROL SULFATE 2.5 MG/3ML
2.5 SOLUTION RESPIRATORY (INHALATION)
Status: DISCONTINUED | OUTPATIENT
Start: 2019-06-14 | End: 2019-06-17 | Stop reason: HOSPADM

## 2019-06-14 RX ORDER — POTASSIUM CHLORIDE 20 MEQ/1
20 TABLET, EXTENDED RELEASE ORAL DAILY
Status: DISCONTINUED | OUTPATIENT
Start: 2019-06-15 | End: 2019-06-17 | Stop reason: HOSPADM

## 2019-06-14 RX ORDER — FLUTICASONE PROPIONATE 50 MCG
1 SPRAY, SUSPENSION (ML) NASAL DAILY
Status: DISCONTINUED | OUTPATIENT
Start: 2019-06-15 | End: 2019-06-17 | Stop reason: HOSPADM

## 2019-06-14 RX ADMIN — SODIUM CHLORIDE 10 UNITS/HR: 9 INJECTION, SOLUTION INTRAVENOUS at 18:14

## 2019-06-14 RX ADMIN — ALBUTEROL SULFATE 2.5 MG: 2.5 SOLUTION RESPIRATORY (INHALATION) at 19:34

## 2019-06-14 RX ADMIN — ALBUTEROL SULFATE 10 MG: 2.5 SOLUTION RESPIRATORY (INHALATION) at 15:20

## 2019-06-14 RX ADMIN — HEPARIN SODIUM 5000 UNITS: 5000 INJECTION INTRAVENOUS; SUBCUTANEOUS at 21:19

## 2019-06-14 RX ADMIN — METHYLPREDNISOLONE SODIUM SUCCINATE 40 MG: 40 INJECTION, POWDER, FOR SOLUTION INTRAMUSCULAR; INTRAVENOUS at 21:19

## 2019-06-14 RX ADMIN — ALBUTEROL SULFATE 10 MG: 2.5 SOLUTION RESPIRATORY (INHALATION) at 13:36

## 2019-06-14 RX ADMIN — MAGNESIUM SULFATE HEPTAHYDRATE 2 G: 40 INJECTION, SOLUTION INTRAVENOUS at 13:44

## 2019-06-14 RX ADMIN — PRAVASTATIN SODIUM 80 MG: 80 TABLET ORAL at 17:55

## 2019-06-14 RX ADMIN — CYCLOBENZAPRINE HYDROCHLORIDE 10 MG: 10 TABLET, FILM COATED ORAL at 17:55

## 2019-06-14 RX ADMIN — IPRATROPIUM BROMIDE 1 MG: 0.5 SOLUTION RESPIRATORY (INHALATION) at 15:21

## 2019-06-14 RX ADMIN — ISODIUM CHLORIDE 3 ML: 0.03 SOLUTION RESPIRATORY (INHALATION) at 15:21

## 2019-06-14 RX ADMIN — IPRATROPIUM BROMIDE 1 MG: 0.5 SOLUTION RESPIRATORY (INHALATION) at 13:37

## 2019-06-14 RX ADMIN — ISODIUM CHLORIDE 3 ML: 0.03 SOLUTION RESPIRATORY (INHALATION) at 13:37

## 2019-06-14 RX ADMIN — METHYLPREDNISOLONE SODIUM SUCCINATE 125 MG: 125 INJECTION, POWDER, FOR SOLUTION INTRAMUSCULAR; INTRAVENOUS at 13:43

## 2019-06-14 RX ADMIN — PANTOPRAZOLE SODIUM 40 MG: 40 TABLET, DELAYED RELEASE ORAL at 17:55

## 2019-06-14 RX ADMIN — METHYLPREDNISOLONE SODIUM SUCCINATE 40 MG: 40 INJECTION, POWDER, FOR SOLUTION INTRAMUSCULAR; INTRAVENOUS at 17:55

## 2019-06-14 RX ADMIN — DOCUSATE SODIUM 100 MG: 100 CAPSULE, LIQUID FILLED ORAL at 17:55

## 2019-06-14 RX ADMIN — OXYCODONE HYDROCHLORIDE AND ACETAMINOPHEN 1.5 TABLET: 5; 325 TABLET ORAL at 17:55

## 2019-06-14 RX ADMIN — METOPROLOL TARTRATE 25 MG: 25 TABLET ORAL at 17:56

## 2019-06-15 LAB
ANION GAP SERPL CALCULATED.3IONS-SCNC: 14 MMOL/L (ref 4–13)
ANION GAP SERPL CALCULATED.3IONS-SCNC: 5 MMOL/L (ref 4–13)
ANION GAP SERPL CALCULATED.3IONS-SCNC: 5 MMOL/L (ref 4–13)
BASE EX.OXY STD BLDV CALC-SCNC: 68.2 % (ref 60–80)
BASE EXCESS BLDV CALC-SCNC: 2.7 MMOL/L
BASOPHILS # BLD AUTO: 0.01 THOUSANDS/ΜL (ref 0–0.1)
BASOPHILS NFR BLD AUTO: 0 % (ref 0–1)
BETA-HYDROXYBUTYRATE: 0.2 MMOL/L
BUN SERPL-MCNC: 19 MG/DL (ref 5–25)
BUN SERPL-MCNC: 26 MG/DL (ref 5–25)
BUN SERPL-MCNC: 27 MG/DL (ref 5–25)
CALCIUM SERPL-MCNC: 7.9 MG/DL (ref 8.3–10.1)
CALCIUM SERPL-MCNC: 8.4 MG/DL (ref 8.3–10.1)
CALCIUM SERPL-MCNC: 8.7 MG/DL (ref 8.3–10.1)
CHLORIDE SERPL-SCNC: 95 MMOL/L (ref 100–108)
CHLORIDE SERPL-SCNC: 97 MMOL/L (ref 100–108)
CHLORIDE SERPL-SCNC: 98 MMOL/L (ref 100–108)
CO2 SERPL-SCNC: 24 MMOL/L (ref 21–32)
CO2 SERPL-SCNC: 32 MMOL/L (ref 21–32)
CO2 SERPL-SCNC: 33 MMOL/L (ref 21–32)
CREAT SERPL-MCNC: 1.59 MG/DL (ref 0.6–1.3)
CREAT SERPL-MCNC: 1.79 MG/DL (ref 0.6–1.3)
CREAT SERPL-MCNC: 1.86 MG/DL (ref 0.6–1.3)
CREAT UR-MCNC: 65.6 MG/DL
EOSINOPHIL # BLD AUTO: 0 THOUSAND/ΜL (ref 0–0.61)
EOSINOPHIL NFR BLD AUTO: 0 % (ref 0–6)
ERYTHROCYTE [DISTWIDTH] IN BLOOD BY AUTOMATED COUNT: 13 % (ref 11.6–15.1)
EST. AVERAGE GLUCOSE BLD GHB EST-MCNC: 192 MG/DL
GFR SERPL CREATININE-BSD FRML MDRD: 36 ML/MIN/1.73SQ M
GFR SERPL CREATININE-BSD FRML MDRD: 37 ML/MIN/1.73SQ M
GFR SERPL CREATININE-BSD FRML MDRD: 43 ML/MIN/1.73SQ M
GLUCOSE SERPL-MCNC: 127 MG/DL (ref 65–140)
GLUCOSE SERPL-MCNC: 181 MG/DL (ref 65–140)
GLUCOSE SERPL-MCNC: 183 MG/DL (ref 65–140)
GLUCOSE SERPL-MCNC: 184 MG/DL (ref 65–140)
GLUCOSE SERPL-MCNC: 187 MG/DL (ref 65–140)
GLUCOSE SERPL-MCNC: 198 MG/DL (ref 65–140)
GLUCOSE SERPL-MCNC: 201 MG/DL (ref 65–140)
GLUCOSE SERPL-MCNC: 202 MG/DL (ref 65–140)
GLUCOSE SERPL-MCNC: 209 MG/DL (ref 65–140)
GLUCOSE SERPL-MCNC: 251 MG/DL (ref 65–140)
GLUCOSE SERPL-MCNC: 263 MG/DL (ref 65–140)
GLUCOSE SERPL-MCNC: 266 MG/DL (ref 65–140)
GLUCOSE SERPL-MCNC: 382 MG/DL (ref 65–140)
GLUCOSE SERPL-MCNC: 438 MG/DL (ref 65–140)
GLUCOSE SERPL-MCNC: 82 MG/DL (ref 65–140)
HBA1C MFR BLD: 8.3 % (ref 4.2–6.3)
HCO3 BLDV-SCNC: 31.4 MMOL/L (ref 24–30)
HCT VFR BLD AUTO: 35.2 % (ref 36.5–49.3)
HGB BLD-MCNC: 11.3 G/DL (ref 12–17)
IMM GRANULOCYTES # BLD AUTO: 0.06 THOUSAND/UL (ref 0–0.2)
IMM GRANULOCYTES NFR BLD AUTO: 1 % (ref 0–2)
LACTATE SERPL-SCNC: 3.6 MMOL/L (ref 0.5–2)
LACTATE SERPL-SCNC: 4 MMOL/L (ref 0.5–2)
LACTATE SERPL-SCNC: 5.2 MMOL/L (ref 0.5–2)
LYMPHOCYTES # BLD AUTO: 0.49 THOUSANDS/ΜL (ref 0.6–4.47)
LYMPHOCYTES NFR BLD AUTO: 5 % (ref 14–44)
MAGNESIUM SERPL-MCNC: 2.7 MG/DL (ref 1.6–2.6)
MCH RBC QN AUTO: 27.8 PG (ref 26.8–34.3)
MCHC RBC AUTO-ENTMCNC: 32.1 G/DL (ref 31.4–37.4)
MCV RBC AUTO: 87 FL (ref 82–98)
MICROALBUMIN UR-MCNC: 21.6 MG/L (ref 0–20)
MICROALBUMIN/CREAT 24H UR: 33 MG/G CREATININE (ref 0–30)
MONOCYTES # BLD AUTO: 0.21 THOUSAND/ΜL (ref 0.17–1.22)
MONOCYTES NFR BLD AUTO: 2 % (ref 4–12)
NEUTROPHILS # BLD AUTO: 9.52 THOUSANDS/ΜL (ref 1.85–7.62)
NEUTS SEG NFR BLD AUTO: 92 % (ref 43–75)
NRBC BLD AUTO-RTO: 0 /100 WBCS
O2 CT BLDV-SCNC: 12.9 ML/DL
PCO2 BLDV: 68.5 MM HG (ref 42–50)
PH BLDV: 7.28 [PH] (ref 7.3–7.4)
PHOSPHATE SERPL-MCNC: 2.6 MG/DL (ref 2.3–4.1)
PLATELET # BLD AUTO: 157 THOUSANDS/UL (ref 149–390)
PMV BLD AUTO: 10.9 FL (ref 8.9–12.7)
PO2 BLDV: 40.7 MM HG (ref 35–45)
POTASSIUM SERPL-SCNC: 4.8 MMOL/L (ref 3.5–5.3)
POTASSIUM SERPL-SCNC: 4.8 MMOL/L (ref 3.5–5.3)
POTASSIUM SERPL-SCNC: 4.9 MMOL/L (ref 3.5–5.3)
PROCALCITONIN SERPL-MCNC: 0.16 NG/ML
RBC # BLD AUTO: 4.06 MILLION/UL (ref 3.88–5.62)
SODIUM SERPL-SCNC: 133 MMOL/L (ref 136–145)
SODIUM SERPL-SCNC: 134 MMOL/L (ref 136–145)
SODIUM SERPL-SCNC: 136 MMOL/L (ref 136–145)
WBC # BLD AUTO: 10.29 THOUSAND/UL (ref 4.31–10.16)

## 2019-06-15 PROCEDURE — 82010 KETONE BODYS QUAN: CPT | Performed by: PHYSICIAN ASSISTANT

## 2019-06-15 PROCEDURE — 82043 UR ALBUMIN QUANTITATIVE: CPT | Performed by: INTERNAL MEDICINE

## 2019-06-15 PROCEDURE — 83735 ASSAY OF MAGNESIUM: CPT | Performed by: PHYSICIAN ASSISTANT

## 2019-06-15 PROCEDURE — 85025 COMPLETE CBC W/AUTO DIFF WBC: CPT | Performed by: PHYSICIAN ASSISTANT

## 2019-06-15 PROCEDURE — 99233 SBSQ HOSP IP/OBS HIGH 50: CPT | Performed by: NURSE PRACTITIONER

## 2019-06-15 PROCEDURE — 82948 REAGENT STRIP/BLOOD GLUCOSE: CPT

## 2019-06-15 PROCEDURE — 80048 BASIC METABOLIC PNL TOTAL CA: CPT | Performed by: PHYSICIAN ASSISTANT

## 2019-06-15 PROCEDURE — 94640 AIRWAY INHALATION TREATMENT: CPT

## 2019-06-15 PROCEDURE — 82805 BLOOD GASES W/O2 SATURATION: CPT | Performed by: PHYSICIAN ASSISTANT

## 2019-06-15 PROCEDURE — 84100 ASSAY OF PHOSPHORUS: CPT | Performed by: PHYSICIAN ASSISTANT

## 2019-06-15 PROCEDURE — 83036 HEMOGLOBIN GLYCOSYLATED A1C: CPT | Performed by: PHYSICIAN ASSISTANT

## 2019-06-15 PROCEDURE — 94760 N-INVAS EAR/PLS OXIMETRY 1: CPT

## 2019-06-15 PROCEDURE — 82570 ASSAY OF URINE CREATININE: CPT | Performed by: INTERNAL MEDICINE

## 2019-06-15 PROCEDURE — 83605 ASSAY OF LACTIC ACID: CPT | Performed by: PHYSICIAN ASSISTANT

## 2019-06-15 PROCEDURE — 84145 PROCALCITONIN (PCT): CPT | Performed by: PHYSICIAN ASSISTANT

## 2019-06-15 PROCEDURE — 99222 1ST HOSP IP/OBS MODERATE 55: CPT | Performed by: INTERNAL MEDICINE

## 2019-06-15 RX ORDER — SODIUM CHLORIDE 9 MG/ML
100 INJECTION, SOLUTION INTRAVENOUS CONTINUOUS
Status: DISCONTINUED | OUTPATIENT
Start: 2019-06-15 | End: 2019-06-16

## 2019-06-15 RX ADMIN — POTASSIUM CHLORIDE 20 MEQ: 1500 TABLET, EXTENDED RELEASE ORAL at 08:50

## 2019-06-15 RX ADMIN — SODIUM CHLORIDE 10 UNITS/HR: 9 INJECTION, SOLUTION INTRAVENOUS at 01:30

## 2019-06-15 RX ADMIN — METOPROLOL TARTRATE 25 MG: 25 TABLET ORAL at 16:02

## 2019-06-15 RX ADMIN — PANTOPRAZOLE SODIUM 40 MG: 40 TABLET, DELAYED RELEASE ORAL at 05:55

## 2019-06-15 RX ADMIN — METHYLPREDNISOLONE SODIUM SUCCINATE 40 MG: 40 INJECTION, POWDER, FOR SOLUTION INTRAMUSCULAR; INTRAVENOUS at 12:56

## 2019-06-15 RX ADMIN — ASPIRIN 81 MG: 81 TABLET, COATED ORAL at 08:50

## 2019-06-15 RX ADMIN — CYCLOBENZAPRINE HYDROCHLORIDE 10 MG: 10 TABLET, FILM COATED ORAL at 18:29

## 2019-06-15 RX ADMIN — ALBUTEROL SULFATE 2.5 MG: 2.5 SOLUTION RESPIRATORY (INHALATION) at 21:06

## 2019-06-15 RX ADMIN — HEPARIN SODIUM 5000 UNITS: 5000 INJECTION INTRAVENOUS; SUBCUTANEOUS at 12:55

## 2019-06-15 RX ADMIN — SODIUM CHLORIDE 1000 ML: 0.9 INJECTION, SOLUTION INTRAVENOUS at 04:56

## 2019-06-15 RX ADMIN — HEPARIN SODIUM 5000 UNITS: 5000 INJECTION INTRAVENOUS; SUBCUTANEOUS at 21:32

## 2019-06-15 RX ADMIN — METHYLPREDNISOLONE SODIUM SUCCINATE 40 MG: 40 INJECTION, POWDER, FOR SOLUTION INTRAMUSCULAR; INTRAVENOUS at 21:32

## 2019-06-15 RX ADMIN — BUDESONIDE AND FORMOTEROL FUMARATE DIHYDRATE 2 PUFF: 160; 4.5 AEROSOL RESPIRATORY (INHALATION) at 08:51

## 2019-06-15 RX ADMIN — FLUTICASONE PROPIONATE 1 SPRAY: 50 SPRAY, METERED NASAL at 08:51

## 2019-06-15 RX ADMIN — ALBUTEROL SULFATE 2.5 MG: 2.5 SOLUTION RESPIRATORY (INHALATION) at 07:25

## 2019-06-15 RX ADMIN — METHYLPREDNISOLONE SODIUM SUCCINATE 40 MG: 40 INJECTION, POWDER, FOR SOLUTION INTRAMUSCULAR; INTRAVENOUS at 05:55

## 2019-06-15 RX ADMIN — OXYCODONE HYDROCHLORIDE AND ACETAMINOPHEN 1.5 TABLET: 5; 325 TABLET ORAL at 18:28

## 2019-06-15 RX ADMIN — INSULIN LISPRO 14 UNITS: 100 INJECTION, SOLUTION INTRAVENOUS; SUBCUTANEOUS at 16:02

## 2019-06-15 RX ADMIN — FERROUS SULFATE TAB 325 MG (65 MG ELEMENTAL FE) 325 MG: 325 (65 FE) TAB at 08:50

## 2019-06-15 RX ADMIN — BUDESONIDE AND FORMOTEROL FUMARATE DIHYDRATE 2 PUFF: 160; 4.5 AEROSOL RESPIRATORY (INHALATION) at 16:02

## 2019-06-15 RX ADMIN — PANTOPRAZOLE SODIUM 40 MG: 40 TABLET, DELAYED RELEASE ORAL at 16:03

## 2019-06-15 RX ADMIN — SODIUM CHLORIDE 100 ML/HR: 0.9 INJECTION, SOLUTION INTRAVENOUS at 10:48

## 2019-06-15 RX ADMIN — METOPROLOL TARTRATE 25 MG: 25 TABLET ORAL at 08:50

## 2019-06-15 RX ADMIN — SODIUM CHLORIDE 12 UNITS/HR: 9 INJECTION, SOLUTION INTRAVENOUS at 20:30

## 2019-06-15 RX ADMIN — SODIUM CHLORIDE 100 ML/HR: 0.9 INJECTION, SOLUTION INTRAVENOUS at 03:45

## 2019-06-15 RX ADMIN — PRAVASTATIN SODIUM 80 MG: 80 TABLET ORAL at 16:03

## 2019-06-15 RX ADMIN — OXYCODONE HYDROCHLORIDE AND ACETAMINOPHEN 1.5 TABLET: 5; 325 TABLET ORAL at 06:00

## 2019-06-15 RX ADMIN — CYCLOBENZAPRINE HYDROCHLORIDE 10 MG: 10 TABLET, FILM COATED ORAL at 06:00

## 2019-06-15 RX ADMIN — SODIUM CHLORIDE 1000 ML: 0.9 INJECTION, SOLUTION INTRAVENOUS at 01:30

## 2019-06-15 RX ADMIN — HEPARIN SODIUM 5000 UNITS: 5000 INJECTION INTRAVENOUS; SUBCUTANEOUS at 05:55

## 2019-06-16 LAB
ANION GAP SERPL CALCULATED.3IONS-SCNC: 1 MMOL/L (ref 4–13)
BUN SERPL-MCNC: 34 MG/DL (ref 5–25)
CALCIUM SERPL-MCNC: 8 MG/DL (ref 8.3–10.1)
CHLORIDE SERPL-SCNC: 103 MMOL/L (ref 100–108)
CO2 SERPL-SCNC: 31 MMOL/L (ref 21–32)
CREAT SERPL-MCNC: 1.22 MG/DL (ref 0.6–1.3)
ERYTHROCYTE [DISTWIDTH] IN BLOOD BY AUTOMATED COUNT: 13.2 % (ref 11.6–15.1)
GFR SERPL CREATININE-BSD FRML MDRD: 59 ML/MIN/1.73SQ M
GLUCOSE SERPL-MCNC: 136 MG/DL (ref 65–140)
GLUCOSE SERPL-MCNC: 154 MG/DL (ref 65–140)
GLUCOSE SERPL-MCNC: 179 MG/DL (ref 65–140)
GLUCOSE SERPL-MCNC: 183 MG/DL (ref 65–140)
GLUCOSE SERPL-MCNC: 188 MG/DL (ref 65–140)
GLUCOSE SERPL-MCNC: 232 MG/DL (ref 65–140)
GLUCOSE SERPL-MCNC: 233 MG/DL (ref 65–140)
GLUCOSE SERPL-MCNC: 269 MG/DL (ref 65–140)
GLUCOSE SERPL-MCNC: 305 MG/DL (ref 65–140)
GLUCOSE SERPL-MCNC: 73 MG/DL (ref 65–140)
GLUCOSE SERPL-MCNC: 94 MG/DL (ref 65–140)
HCT VFR BLD AUTO: 36.7 % (ref 36.5–49.3)
HGB BLD-MCNC: 11.3 G/DL (ref 12–17)
MCH RBC QN AUTO: 27.8 PG (ref 26.8–34.3)
MCHC RBC AUTO-ENTMCNC: 30.8 G/DL (ref 31.4–37.4)
MCV RBC AUTO: 90 FL (ref 82–98)
PLATELET # BLD AUTO: 151 THOUSANDS/UL (ref 149–390)
PMV BLD AUTO: 10.9 FL (ref 8.9–12.7)
POTASSIUM SERPL-SCNC: 5.4 MMOL/L (ref 3.5–5.3)
RBC # BLD AUTO: 4.07 MILLION/UL (ref 3.88–5.62)
SODIUM SERPL-SCNC: 135 MMOL/L (ref 136–145)
WBC # BLD AUTO: 13.2 THOUSAND/UL (ref 4.31–10.16)

## 2019-06-16 PROCEDURE — 99232 SBSQ HOSP IP/OBS MODERATE 35: CPT | Performed by: INTERNAL MEDICINE

## 2019-06-16 PROCEDURE — 94760 N-INVAS EAR/PLS OXIMETRY 1: CPT

## 2019-06-16 PROCEDURE — 80048 BASIC METABOLIC PNL TOTAL CA: CPT | Performed by: NURSE PRACTITIONER

## 2019-06-16 PROCEDURE — 94640 AIRWAY INHALATION TREATMENT: CPT

## 2019-06-16 PROCEDURE — 85027 COMPLETE CBC AUTOMATED: CPT | Performed by: NURSE PRACTITIONER

## 2019-06-16 PROCEDURE — 99232 SBSQ HOSP IP/OBS MODERATE 35: CPT | Performed by: NURSE PRACTITIONER

## 2019-06-16 PROCEDURE — 82948 REAGENT STRIP/BLOOD GLUCOSE: CPT

## 2019-06-16 RX ORDER — FUROSEMIDE 40 MG/1
40 TABLET ORAL DAILY
Status: DISCONTINUED | OUTPATIENT
Start: 2019-06-16 | End: 2019-06-17 | Stop reason: HOSPADM

## 2019-06-16 RX ORDER — METHYLPREDNISOLONE SODIUM SUCCINATE 40 MG/ML
40 INJECTION, POWDER, LYOPHILIZED, FOR SOLUTION INTRAMUSCULAR; INTRAVENOUS EVERY 12 HOURS SCHEDULED
Status: DISCONTINUED | OUTPATIENT
Start: 2019-06-16 | End: 2019-06-17

## 2019-06-16 RX ADMIN — CYCLOBENZAPRINE HYDROCHLORIDE 10 MG: 10 TABLET, FILM COATED ORAL at 16:47

## 2019-06-16 RX ADMIN — ALBUTEROL SULFATE 2.5 MG: 2.5 SOLUTION RESPIRATORY (INHALATION) at 08:03

## 2019-06-16 RX ADMIN — OXYCODONE HYDROCHLORIDE AND ACETAMINOPHEN 1.5 TABLET: 5; 325 TABLET ORAL at 05:48

## 2019-06-16 RX ADMIN — CYCLOBENZAPRINE HYDROCHLORIDE 10 MG: 10 TABLET, FILM COATED ORAL at 05:48

## 2019-06-16 RX ADMIN — FUROSEMIDE 40 MG: 40 TABLET ORAL at 16:49

## 2019-06-16 RX ADMIN — METHYLPREDNISOLONE SODIUM SUCCINATE 40 MG: 40 INJECTION, POWDER, FOR SOLUTION INTRAMUSCULAR; INTRAVENOUS at 12:58

## 2019-06-16 RX ADMIN — HEPARIN SODIUM 5000 UNITS: 5000 INJECTION INTRAVENOUS; SUBCUTANEOUS at 12:58

## 2019-06-16 RX ADMIN — FLUTICASONE PROPIONATE 1 SPRAY: 50 SPRAY, METERED NASAL at 08:42

## 2019-06-16 RX ADMIN — FERROUS SULFATE TAB 325 MG (65 MG ELEMENTAL FE) 325 MG: 325 (65 FE) TAB at 08:42

## 2019-06-16 RX ADMIN — BUDESONIDE AND FORMOTEROL FUMARATE DIHYDRATE 2 PUFF: 160; 4.5 AEROSOL RESPIRATORY (INHALATION) at 08:41

## 2019-06-16 RX ADMIN — ASPIRIN 81 MG: 81 TABLET, COATED ORAL at 08:42

## 2019-06-16 RX ADMIN — METOPROLOL TARTRATE 25 MG: 25 TABLET ORAL at 16:47

## 2019-06-16 RX ADMIN — OXYCODONE HYDROCHLORIDE AND ACETAMINOPHEN 1.5 TABLET: 5; 325 TABLET ORAL at 16:46

## 2019-06-16 RX ADMIN — SODIUM CHLORIDE 100 ML/HR: 0.9 INJECTION, SOLUTION INTRAVENOUS at 08:39

## 2019-06-16 RX ADMIN — INSULIN HUMAN 40 UNITS: 500 INJECTION, SOLUTION SUBCUTANEOUS at 16:53

## 2019-06-16 RX ADMIN — ALBUTEROL SULFATE 2.5 MG: 2.5 SOLUTION RESPIRATORY (INHALATION) at 19:18

## 2019-06-16 RX ADMIN — PANTOPRAZOLE SODIUM 40 MG: 40 TABLET, DELAYED RELEASE ORAL at 16:47

## 2019-06-16 RX ADMIN — METHYLPREDNISOLONE SODIUM SUCCINATE 40 MG: 40 INJECTION, POWDER, FOR SOLUTION INTRAMUSCULAR; INTRAVENOUS at 05:11

## 2019-06-16 RX ADMIN — INSULIN LISPRO 6 UNITS: 100 INJECTION, SOLUTION INTRAVENOUS; SUBCUTANEOUS at 16:50

## 2019-06-16 RX ADMIN — POTASSIUM CHLORIDE 20 MEQ: 1500 TABLET, EXTENDED RELEASE ORAL at 08:42

## 2019-06-16 RX ADMIN — INSULIN LISPRO 14 UNITS: 100 INJECTION, SOLUTION INTRAVENOUS; SUBCUTANEOUS at 08:39

## 2019-06-16 RX ADMIN — HEPARIN SODIUM 5000 UNITS: 5000 INJECTION INTRAVENOUS; SUBCUTANEOUS at 05:11

## 2019-06-16 RX ADMIN — METHYLPREDNISOLONE SODIUM SUCCINATE 40 MG: 40 INJECTION, POWDER, FOR SOLUTION INTRAMUSCULAR; INTRAVENOUS at 21:01

## 2019-06-16 RX ADMIN — INSULIN LISPRO 8 UNITS: 100 INJECTION, SOLUTION INTRAVENOUS; SUBCUTANEOUS at 21:04

## 2019-06-16 RX ADMIN — PANTOPRAZOLE SODIUM 40 MG: 40 TABLET, DELAYED RELEASE ORAL at 05:49

## 2019-06-16 RX ADMIN — BUDESONIDE AND FORMOTEROL FUMARATE DIHYDRATE 2 PUFF: 160; 4.5 AEROSOL RESPIRATORY (INHALATION) at 16:49

## 2019-06-16 RX ADMIN — HEPARIN SODIUM 5000 UNITS: 5000 INJECTION INTRAVENOUS; SUBCUTANEOUS at 21:04

## 2019-06-16 RX ADMIN — PRAVASTATIN SODIUM 80 MG: 80 TABLET ORAL at 16:47

## 2019-06-17 VITALS
HEART RATE: 79 BPM | RESPIRATION RATE: 22 BRPM | BODY MASS INDEX: 18.39 KG/M2 | OXYGEN SATURATION: 96 % | DIASTOLIC BLOOD PRESSURE: 66 MMHG | WEIGHT: 135.8 LBS | TEMPERATURE: 98.4 F | SYSTOLIC BLOOD PRESSURE: 105 MMHG | HEIGHT: 72 IN

## 2019-06-17 LAB
ANION GAP SERPL CALCULATED.3IONS-SCNC: 3 MMOL/L (ref 4–13)
BASE EXCESS BLDA CALC-SCNC: -2 MMOL/L (ref -2–3)
BUN SERPL-MCNC: 36 MG/DL (ref 5–25)
CA-I BLD-SCNC: 1.15 MMOL/L (ref 1.12–1.32)
CALCIUM SERPL-MCNC: 8.4 MG/DL (ref 8.3–10.1)
CHLORIDE SERPL-SCNC: 100 MMOL/L (ref 100–108)
CO2 SERPL-SCNC: 32 MMOL/L (ref 21–32)
CREAT SERPL-MCNC: 1.38 MG/DL (ref 0.6–1.3)
GFR SERPL CREATININE-BSD FRML MDRD: 51 ML/MIN/1.73SQ M
GLUCOSE SERPL-MCNC: 338 MG/DL (ref 65–140)
GLUCOSE SERPL-MCNC: 340 MG/DL (ref 65–140)
GLUCOSE SERPL-MCNC: 357 MG/DL (ref 65–140)
GLUCOSE SERPL-MCNC: 513 MG/DL (ref 65–140)
HCO3 BLDA-SCNC: 25.1 MMOL/L (ref 22–28)
HCT VFR BLD CALC: 37 % (ref 36.5–49.3)
HGB BLDA-MCNC: 12.6 G/DL (ref 12–17)
PCO2 BLD: 27 MMOL/L (ref 21–32)
PCO2 BLD: 49.6 MM HG (ref 36–44)
PH BLD: 7.31 [PH] (ref 7.35–7.45)
PO2 BLD: 92 MM HG (ref 75–129)
POTASSIUM BLD-SCNC: 5.1 MMOL/L (ref 3.5–5.3)
POTASSIUM SERPL-SCNC: 5.2 MMOL/L (ref 3.5–5.3)
SAO2 % BLD FROM PO2: 96 % (ref 95–98)
SODIUM BLD-SCNC: 128 MMOL/L (ref 136–145)
SODIUM SERPL-SCNC: 135 MMOL/L (ref 136–145)
SPECIMEN SOURCE: ABNORMAL

## 2019-06-17 PROCEDURE — 94760 N-INVAS EAR/PLS OXIMETRY 1: CPT

## 2019-06-17 PROCEDURE — 99239 HOSP IP/OBS DSCHRG MGMT >30: CPT | Performed by: PHYSICIAN ASSISTANT

## 2019-06-17 PROCEDURE — 99232 SBSQ HOSP IP/OBS MODERATE 35: CPT | Performed by: INTERNAL MEDICINE

## 2019-06-17 PROCEDURE — 82948 REAGENT STRIP/BLOOD GLUCOSE: CPT

## 2019-06-17 PROCEDURE — 80048 BASIC METABOLIC PNL TOTAL CA: CPT | Performed by: NURSE PRACTITIONER

## 2019-06-17 PROCEDURE — 94640 AIRWAY INHALATION TREATMENT: CPT

## 2019-06-17 RX ORDER — PREDNISONE 20 MG/1
TABLET ORAL
Qty: 15 TABLET | Refills: 0 | Status: SHIPPED | OUTPATIENT
Start: 2019-06-17 | End: 2019-06-29

## 2019-06-17 RX ORDER — PREDNISONE 20 MG/1
40 TABLET ORAL DAILY
Status: DISCONTINUED | OUTPATIENT
Start: 2019-06-17 | End: 2019-06-17 | Stop reason: HOSPADM

## 2019-06-17 RX ADMIN — CYCLOBENZAPRINE HYDROCHLORIDE 10 MG: 10 TABLET, FILM COATED ORAL at 15:48

## 2019-06-17 RX ADMIN — PANTOPRAZOLE SODIUM 40 MG: 40 TABLET, DELAYED RELEASE ORAL at 05:30

## 2019-06-17 RX ADMIN — METHYLPREDNISOLONE SODIUM SUCCINATE 40 MG: 40 INJECTION, POWDER, FOR SOLUTION INTRAMUSCULAR; INTRAVENOUS at 08:11

## 2019-06-17 RX ADMIN — FLUTICASONE PROPIONATE 1 SPRAY: 50 SPRAY, METERED NASAL at 08:11

## 2019-06-17 RX ADMIN — HEPARIN SODIUM 5000 UNITS: 5000 INJECTION INTRAVENOUS; SUBCUTANEOUS at 05:29

## 2019-06-17 RX ADMIN — INSULIN HUMAN 50 UNITS: 500 INJECTION, SOLUTION SUBCUTANEOUS at 07:55

## 2019-06-17 RX ADMIN — CYCLOBENZAPRINE HYDROCHLORIDE 10 MG: 10 TABLET, FILM COATED ORAL at 05:32

## 2019-06-17 RX ADMIN — ALBUTEROL SULFATE 2.5 MG: 2.5 SOLUTION RESPIRATORY (INHALATION) at 07:37

## 2019-06-17 RX ADMIN — METOPROLOL TARTRATE 25 MG: 25 TABLET ORAL at 08:10

## 2019-06-17 RX ADMIN — BUDESONIDE AND FORMOTEROL FUMARATE DIHYDRATE 2 PUFF: 160; 4.5 AEROSOL RESPIRATORY (INHALATION) at 08:11

## 2019-06-17 RX ADMIN — POTASSIUM CHLORIDE 20 MEQ: 1500 TABLET, EXTENDED RELEASE ORAL at 08:10

## 2019-06-17 RX ADMIN — HEPARIN SODIUM 5000 UNITS: 5000 INJECTION INTRAVENOUS; SUBCUTANEOUS at 13:55

## 2019-06-17 RX ADMIN — INSULIN LISPRO 8 UNITS: 100 INJECTION, SOLUTION INTRAVENOUS; SUBCUTANEOUS at 12:31

## 2019-06-17 RX ADMIN — ASPIRIN 81 MG: 81 TABLET, COATED ORAL at 08:10

## 2019-06-17 RX ADMIN — OXYCODONE HYDROCHLORIDE AND ACETAMINOPHEN 1.5 TABLET: 5; 325 TABLET ORAL at 15:48

## 2019-06-17 RX ADMIN — INSULIN LISPRO 8 UNITS: 100 INJECTION, SOLUTION INTRAVENOUS; SUBCUTANEOUS at 08:11

## 2019-06-17 RX ADMIN — FERROUS SULFATE TAB 325 MG (65 MG ELEMENTAL FE) 325 MG: 325 (65 FE) TAB at 08:10

## 2019-06-17 RX ADMIN — OXYCODONE HYDROCHLORIDE AND ACETAMINOPHEN 1.5 TABLET: 5; 325 TABLET ORAL at 05:32

## 2019-06-17 RX ADMIN — FUROSEMIDE 40 MG: 40 TABLET ORAL at 08:10

## 2019-06-17 RX ADMIN — INSULIN HUMAN 50 UNITS: 500 INJECTION, SOLUTION SUBCUTANEOUS at 12:31

## 2019-07-29 PROBLEM — J96.11 CHRONIC RESPIRATORY FAILURE WITH HYPOXIA AND HYPERCAPNIA (HCC): Status: ACTIVE | Noted: 2019-06-14

## 2019-07-29 PROBLEM — J96.12 CHRONIC RESPIRATORY FAILURE WITH HYPOXIA AND HYPERCAPNIA (HCC): Status: ACTIVE | Noted: 2019-06-14

## 2019-07-29 NOTE — PROGRESS NOTES
Pulmonary Follow Up Note   Emy Childers Sr  72 y o  male MRN: [id number]  7/30/2019      Assessment:    COPD, severe ([facility] [address] )  Assessment & Plan  - GOLD classification D as patient has had a recent exacerbation requiring hospitalization  - SOB is at baseline since his recent hospitalization, no pneumonia or worsening cough/sputum production at this time  - He is on maximal inhalation therapy   Plan:  - Given he is on triple inhaler therapy, will give a trial/sample of Trelegy  - Will start azithromycin 250mg 3x/week  - Will refer to pulmonary rehab  - F/up in 3 months and if still symptomatic would recommend roflumilast    Chronic respiratory failure with hypoxia and hypercapnia (HCC)  Assessment & Plan  - On chronic home O2 titrated to 3L  He reports it helps his SOB but he is still severely limited in his daily activity  He declined completing a 6MWT to see if his O2 level is enough  Plan:  - Continue home O2 day/night  - Does have hypercapnea as well but declined BiPAP due to feeling claustrophobic      Pulmonary hypertension ([facility] [address] )  Assessment & Plan  - Recent echo with a PA pressure of 70mmHg   Presumed group III due to underlying COPD and probable component of group II due to diastolic dysfunction  Plan:  - Recommend continue diuresis, he takes lasix once daily rather than the recommended twice due to the discomfort of persistent urination  - Recommend f/up with PH with eval of PVR    JACQUELINE (obstructive sleep apnea)  Assessment & Plan  - Home sleep study in the past was negative but given his comorbidities and JACQUELINE during REM, would recommend PSG  Plan:  - Will address at the next visit as he is reluctant today to discuss further testing    Chronic diastolic heart failure [facility])  Assessment & Plan  Wt Readings from Last 3 Encounters:   07/30/19 108 kg (239 lb)   06/17/19 61 6 kg (135 lb 12 9 oz)   12/06/18 112 kg (248 lb)     - Currently euvolemic, on lasix  - Last echo in 06/2019 with EF of 65% and severe pulm HTN  Plan:  - Recommend continued lasix 40mg BID as prescribed        Plan:    Diagnoses and all orders for this visit:    COPD, severe ([facility] [address] )  -     Ambulatory Referral to Pulmonary Rehabilitation; Future  -     azithromycin (ZITHROMAX) 250 mg tablet; Take 1 tablet (250 mg total) by mouth 3 (three) times a week for 60 days    Chronic respiratory failure with hypoxia and hypercapnia (HCC)    Pulmonary hypertension (HCC)    Chronic diastolic heart failure (HCC)    JACQUELINE (obstructive sleep apnea)        Return in about 3 months (around 10/30/2019)  History of Present Illness   HPI:  Saqib Hyatt Sr  is a 72 y o  male with a hx of severe COPD here for follow up  He was recently hospitalized in 06/14/2019 for COPD exacerbation for about 2 days  Today he reports feeling at baseline, minor nonproductive cough, intermittent rhinorrhea, no chest pain since his hospitalization  Although he reports his SOB is at baseline, he does mention limiting his daily activity by taking frequent breaks, avoiding steps and he felt very SOB walking from the parking lot to the office this morning  He uses supp O2 3L throughout the entire day for the past several years, no bipap due to feeling claustrophobic  Currently he uses his symbicort, spiriva, levalbuterol neb and flonase  He is up to date on PNA vaccine  He completed pulm rehab in 2017  He smoked 2ppd since the age of 10 and tried quitting multiple times but reports he completely quit last year  His wife passed away from breast cancer last June and therefore he hasn't been keeping up with many of his appointments because she used to help him the most        Review of Systems   Constitutional: Negative for chills, diaphoresis, fatigue and fever  HENT: Negative for congestion, ear pain, nosebleeds, postnasal drip, rhinorrhea, sinus pressure, sinus pain, sneezing, sore throat, trouble swallowing and voice change  Eyes: Negative for pain, redness and itching     Respiratory: Positive for cough and shortness of breath  Negative for apnea, choking, chest tightness, wheezing and stridor  Mild nonproductive   Cardiovascular: Positive for leg swelling  Negative for chest pain and palpitations  Gastrointestinal: Negative for abdominal distention, abdominal pain, blood in stool, constipation, diarrhea, nausea and vomiting  Endocrine: Negative for polydipsia and polyuria  Genitourinary: Negative for dysuria, flank pain, frequency and hematuria  Musculoskeletal: Negative for arthralgias, back pain, joint swelling, myalgias, neck pain and neck stiffness  Skin: Negative for color change, pallor and rash  Neurological: Negative for dizziness, seizures, syncope, weakness, light-headedness, numbness and headaches  Hematological: Negative for adenopathy  Psychiatric/Behavioral: Negative for agitation and confusion  Historical Information   Past Medical History:   Diagnosis Date    Abdominal pain     Cardiac disease     [facility]congestive [facility] failure) ([facility])     COPD (chronic obstructive pulmonary disease) ([facility])     Coronary artery disease     Diabetes mellitus ([place] [address] )     Hyperlipidemia     Hypertension     MI (myocardial infarction) ([place] [address] )     with 3 stents    Prostate cancer ([place] [address] )      Past Surgical History:   Procedure Laterality Date    ABDOMINAL SURGERY      exploratory    ANGIOPLASTY      3 stents    ESOPHAGOGASTRODUODENOSCOPY N/A 10/2/2017    Procedure: ESOPHAGOGASTRODUODENOSCOPY (EGD); Surgeon: Abdulkadir Amato MD;  [facility];   Service: Gastroenterology    PROSTATE SURGERY       Family History   Problem Relation Age of Onset    Heart disease Father     Other Father         Mesothelioma          Meds/Allergies     Current Outpatient Medications:     aspirin (ECOTRIN LOW STRENGTH) 81 mg EC tablet, Take 1 tablet by mouth daily, Disp: , Rfl:     BD INSULIN SYRINGE U-500 31G X 6MM 0 5 ML MISC, USE 3 PER DAY AS DIRECTED WITH U-500 INSULIN, Disp: 100 each, Rfl: 5    budesonide-formoterol (SYMBICORT) 160-4 5 mcg/act inhaler, Inhale 2 puffs 2 (two) times a day, Disp: , Rfl:     cyclobenzaprine (FLEXERIL) 10 mg tablet, Take 10 mg by mouth 2 (two) times a day as needed, Disp: , Rfl:     ferrous sulfate 325 (65 Fe) mg tablet, Take 325 mg by mouth daily with breakfast, Disp: , Rfl:     fluticasone (FLONASE) 50 mcg/act nasal spray, 1 spray into each nostril daily, Disp: 16 g, Rfl: 0    furosemide (LASIX) 40 mg tablet, TAKE 1 TABLET BY MOUTH 2 (TWO) TIMES A DAY, Disp: 60 tablet, Rfl: 4    insulin regular (HumuLIN R U-500) 500 units/mL CONCENTRATED injection, Inject 0 09 mL (45 Units total) under the skin 2 (two) times a day before breakfast and lunch, Disp: 10 mL, Rfl: 0    Insulin Regular Human, Conc, (Insulin Regular Human, Conc,) 500 units/mL CONCENTRATED U-500 injection pen, Inject 35 Units under the skin daily with dinner, Disp: , Rfl:     levalbuterol (XOPENEX) 1 25 mg/3 mL nebulizer solution, USE 1 VIAL IN NEBULIZER THREE TIMES A DAY, Disp: , Rfl: 5    metoprolol tartrate (LOPRESSOR) 25 mg tablet, Take 1 tablet by mouth 2 (two) times a day, Disp: , Rfl:     Omega-3 Fatty Acids (FISH OIL) 645 MG CAPS, Take 1 capsule by mouth 2 (two) times a day, Disp: , Rfl:     pantoprazole (PROTONIX) 40 mg tablet, Take 1 tablet by mouth 2 (two) times a day before meals, Disp: 60 tablet, Rfl: 0    potassium chloride (K-DUR,KLOR-CON) 20 mEq tablet, Take 1 tablet (20 mEq total) by mouth daily, Disp: 60 tablet, Rfl: 0    simvastatin (ZOCOR) 40 mg tablet, Take 40 mg by mouth daily, Disp: , Rfl: 5    tamsulosin (FLOMAX) 0 4 mg, Take 1 capsule (0 4 mg total) by mouth daily with dinner, Disp: 30 capsule, Rfl: 0    tiotropium (SPIRIVA) 18 mcg inhalation capsule, Place 18 mcg into inhaler and inhale daily, Disp: , Rfl:     [START ON 7/31/2019] azithromycin (ZITHROMAX) 250 mg tablet, Take 1 tablet (250 mg total) by mouth 3 (three) times a week for 60 days, Disp: 24 tablet, Rfl: 0    oxyCODONE-acetaminophen (PERCOCET) 7 5-325 MG per tablet, Take by mouth every 4 (four) hours, Disp: , Rfl:   Allergies   Allergen Reactions    Metformin GI Intolerance       Vitals: Blood pressure 140/80, pulse (!) 108, temperature 97 9 °F (36 6 °C), temperature source Tympanic, height 6' (1 829 m), weight 108 kg (239 lb), SpO2 96 %  Body mass index is 32 41 kg/m²  Oxygen Therapy  SpO2: 96 %  Oxygen Therapy: Supplemental oxygen  O2 Delivery Method: Nasal cannula  O2 Flow Rate (L/min): 3 L/min      Physical Exam  Physical Exam   Constitutional: He is oriented to person, place, and time  No distress  HENT:   Head: Normocephalic and atraumatic  Nose: Nose normal    Mouth/Throat: Oropharynx is clear and moist  No oropharyngeal exudate  Eyes: Pupils are equal, round, and reactive to light  Conjunctivae and EOM are normal  No scleral icterus  Neck: Normal range of motion  Neck supple  No JVD present  No tracheal deviation present  No thyromegaly present  Cardiovascular: Normal rate, regular rhythm, normal heart sounds and intact distal pulses  Exam reveals no gallop and no friction rub  No murmur heard  Pulmonary/Chest: Effort normal and breath sounds normal  No stridor  No respiratory distress  He has no wheezes  He has no rales  On supp O2 NC   Abdominal: Soft  Bowel sounds are normal  He exhibits no distension  There is no tenderness  There is no rebound and no guarding  Musculoskeletal: Normal range of motion  He exhibits no edema or deformity  Lymphadenopathy:     He has no cervical adenopathy  Neurological: He is alert and oriented to person, place, and time  No cranial nerve deficit or sensory deficit  Skin: Skin is warm  No rash noted  He is not diaphoretic  No erythema  Bilateral upper extremity ecchymoses   Psychiatric: He has a normal mood and affect  Labs: I have personally reviewed pertinent lab results    Lab Results   Component Value Date    WBC 13 20 (H) 06/16/2019 HGB 11 3 (L) 2019    HCT 36 7 2019    MCV 90 2019     2019     Lab Results   Component Value Date    GLUCOSE 513 (HH) 2019    CALCIUM 8 4 2019     2015    K 5 2 2019    CO2 32 2019     2019    BUN 36 (H) 2019    CREATININE 1 38 (H) 2019     No results found for: IGE  Lab Results   Component Value Date    ALT 29 2019    AST 33 2019    ALKPHOS 101 2019    BILITOT 0 27 2015       Imaging and other studies:  2019- CXR with flattening of the diaphgram and small RT pleural effusion and bilateral cephalization  Pulmonary function testin2016:  FVC  1 75 36%  FEV1  1 02 29%  FEV1/FVC 58% 79%    Severe obstruction    EKG, Pathology, and Other Studies: I have personally reviewed pertinent reports     and I have personally reviewed pertinent films in PACS      Aaron Sanchez MD  Pulmonary and Critical Care Fellow- PGY 4  [facility]

## 2019-07-30 ENCOUNTER — OFFICE VISIT (OUTPATIENT)
Dept: PULMONOLOGY | Facility: CLINIC | Age: 74
End: 2019-07-30
Payer: COMMERCIAL

## 2019-07-30 VITALS
HEIGHT: 72 IN | WEIGHT: 239 LBS | SYSTOLIC BLOOD PRESSURE: 140 MMHG | BODY MASS INDEX: 32.37 KG/M2 | OXYGEN SATURATION: 96 % | TEMPERATURE: 97.9 F | HEART RATE: 108 BPM | DIASTOLIC BLOOD PRESSURE: 80 MMHG

## 2019-07-30 DIAGNOSIS — J96.11 CHRONIC RESPIRATORY FAILURE WITH HYPOXIA AND HYPERCAPNIA (HCC): ICD-10-CM

## 2019-07-30 DIAGNOSIS — J96.12 CHRONIC RESPIRATORY FAILURE WITH HYPOXIA AND HYPERCAPNIA (HCC): ICD-10-CM

## 2019-07-30 DIAGNOSIS — G47.33 OSA (OBSTRUCTIVE SLEEP APNEA): ICD-10-CM

## 2019-07-30 DIAGNOSIS — J44.9 COPD, SEVERE (HCC): Primary | ICD-10-CM

## 2019-07-30 DIAGNOSIS — I27.20 PULMONARY HYPERTENSION (HCC): ICD-10-CM

## 2019-07-30 DIAGNOSIS — I50.32 CHRONIC DIASTOLIC HEART FAILURE (HCC): ICD-10-CM

## 2019-07-30 PROCEDURE — 99215 OFFICE O/P EST HI 40 MIN: CPT | Performed by: INTERNAL MEDICINE

## 2019-07-30 RX ORDER — AZITHROMYCIN 250 MG/1
250 TABLET, FILM COATED ORAL 3 TIMES WEEKLY
Qty: 24 TABLET | Refills: 0 | Status: SHIPPED | OUTPATIENT
Start: 2019-07-31 | End: 2019-09-29

## 2019-07-30 NOTE — ASSESSMENT & PLAN NOTE
- Home sleep study in the past was negative but given his comorbidities and JACQUELINE during REM, would recommend PSG  Plan:  - Will address at the next visit as he is reluctant today to discuss further testing

## 2019-07-30 NOTE — ASSESSMENT & PLAN NOTE
- Recent echo with a PA pressure of 70mmHg   Presumed group III due to underlying COPD and probable component of group II due to diastolic dysfunction  Plan:  - Recommend continue diuresis, he takes lasix once daily rather than the recommended twice due to the discomfort of persistent urination  - Recommend f/up with PH with eval of PVR

## 2019-07-30 NOTE — ASSESSMENT & PLAN NOTE
- On chronic home O2 titrated to 3L  He reports it helps his SOB but he is still severely limited in his daily activity   He declined completing a 6MWT to see if his O2 level is enough  Plan:  - Continue home O2 day/night  - Does have hypercapnea as well but declined BiPAP due to feeling claustrophobic

## 2019-07-30 NOTE — ASSESSMENT & PLAN NOTE
Wt Readings from Last 3 Encounters:   07/30/19 108 kg (239 lb)   06/17/19 61 6 kg (135 lb 12 9 oz)   12/06/18 112 kg (248 lb)     - Currently euvolemic, on lasix  - Last echo in 06/2019 with EF of 65% and severe pulm HTN  Plan:  - Recommend continued lasix 40mg BID as prescribed

## 2019-07-30 NOTE — ASSESSMENT & PLAN NOTE
- GOLD classification D as patient has had a recent exacerbation requiring hospitalization  - SOB is at baseline since his recent hospitalization, no pneumonia or worsening cough/sputum production at this time  - He is on maximal inhalation therapy   Plan:  - Given he is on triple inhaler therapy, will give a trial/sample of Trelegy  - Will start azithromycin 250mg 3x/week  - Will refer to pulmonary rehab  - F/up in 3 months and if still symptomatic would recommend roflumilast

## 2019-08-14 ENCOUNTER — HOSPITAL ENCOUNTER (EMERGENCY)
Facility: HOSPITAL | Age: 74
Discharge: HOME | End: 2019-08-14
Attending: EMERGENCY MEDICINE
Payer: COMMERCIAL

## 2019-08-14 ENCOUNTER — TELEPHONE (OUTPATIENT)
Dept: NEPHROLOGY | Facility: CLINIC | Age: 74
End: 2019-08-14

## 2019-08-14 VITALS
SYSTOLIC BLOOD PRESSURE: 148 MMHG | HEIGHT: 72 IN | DIASTOLIC BLOOD PRESSURE: 72 MMHG | OXYGEN SATURATION: 93 % | BODY MASS INDEX: 33.72 KG/M2 | TEMPERATURE: 98.9 F | HEART RATE: 98 BPM | RESPIRATION RATE: 18 BRPM | WEIGHT: 249 LBS

## 2019-08-14 DIAGNOSIS — M70.21 OLECRANON BURSITIS OF RIGHT ELBOW: Primary | ICD-10-CM

## 2019-08-14 DIAGNOSIS — J44.9 CHRONIC OBSTRUCTIVE PULMONARY DISEASE, UNSPECIFIED COPD TYPE (HCC): Primary | ICD-10-CM

## 2019-08-14 PROCEDURE — 99283 EMERGENCY DEPT VISIT LOW MDM: CPT

## 2019-08-14 PROCEDURE — 99282 EMERGENCY DEPT VISIT SF MDM: CPT | Performed by: EMERGENCY MEDICINE

## 2019-08-14 NOTE — ED PROVIDER NOTES
History  Chief Complaint   Patient presents with    Elbow Swelling     According to the patient, he has had elbow swelling since yesterday and patient reports that the eblow is extremely painful  72 YOM with PMH of DM, COPD, CHF, CKD who presents with 2 days of right elbow swelling  The swelling has been getting progressively worse  He does not have pain with ROM of the elbow but has pain when leaning on it  He denies any recent procedures of the elbow or recent trauma  He denies fever/chills  Prior to Admission Medications   Prescriptions Last Dose Informant Patient Reported? Taking?    BD INSULIN SYRINGE U-500 31G X 6MM 0 5 ML MISC  Self No No   Sig: USE 3 PER DAY AS DIRECTED WITH U-500 INSULIN   Insulin Regular Human, Conc, (Insulin Regular Human, Conc,) 500 units/mL CONCENTRATED U-500 injection pen  Self Yes No   Sig: Inject 35 Units under the skin daily with dinner   Omega-3 Fatty Acids (FISH OIL) 645 MG CAPS  Self Yes No   Sig: Take 1 capsule by mouth 2 (two) times a day   aspirin (ECOTRIN LOW STRENGTH) 81 mg EC tablet  Self Yes No   Sig: Take 1 tablet by mouth daily   azithromycin (ZITHROMAX) 250 mg tablet   No No   Sig: Take 1 tablet (250 mg total) by mouth 3 (three) times a week for 60 days   budesonide-formoterol (SYMBICORT) 160-4 5 mcg/act inhaler  Self Yes No   Sig: Inhale 2 puffs 2 (two) times a day   cyclobenzaprine (FLEXERIL) 10 mg tablet  Self Yes No   Sig: Take 10 mg by mouth 2 (two) times a day as needed   ferrous sulfate 325 (65 Fe) mg tablet  Self Yes No   Sig: Take 325 mg by mouth daily with breakfast   fluticasone (FLONASE) 50 mcg/act nasal spray  Self No No   Si spray into each nostril daily   furosemide (LASIX) 40 mg tablet  Self No No   Sig: TAKE 1 TABLET BY MOUTH 2 (TWO) TIMES A DAY   insulin regular (HumuLIN R U-500) 500 units/mL CONCENTRATED injection  Self No No   Sig: Inject 0 09 mL (45 Units total) under the skin 2 (two) times a day before breakfast and lunch levalbuterol (XOPENEX) 1 25 mg/3 mL nebulizer solution  Self Yes No   Sig: USE 1 VIAL IN NEBULIZER THREE TIMES A DAY   metoprolol tartrate (LOPRESSOR) 25 mg tablet  Self Yes No   Sig: Take 1 tablet by mouth 2 (two) times a day   oxyCODONE-acetaminophen (PERCOCET) 7 5-325 MG per tablet  Self Yes No   Sig: Take by mouth every 4 (four) hours   pantoprazole (PROTONIX) 40 mg tablet  Self No No   Sig: Take 1 tablet by mouth 2 (two) times a day before meals   potassium chloride (K-DUR,KLOR-CON) 20 mEq tablet  Self No No   Sig: Take 1 tablet (20 mEq total) by mouth daily   simvastatin (ZOCOR) 40 mg tablet  Self Yes No   Sig: Take 40 mg by mouth daily   tamsulosin (FLOMAX) 0 4 mg  Self No No   Sig: Take 1 capsule (0 4 mg total) by mouth daily with dinner   tiotropium (SPIRIVA) 18 mcg inhalation capsule  Self Yes No   Sig: Place 18 mcg into inhaler and inhale daily      Facility-Administered Medications: None       Past Medical History:   Diagnosis Date    Abdominal pain     Cardiac disease     [facility]     COPD [facility]chronic obstructive pulmonary disease) [facility]     Coronary artery disease     [facility] mellitus [facility]     Hyperlipidemia     Hypertension     MI (myocardial infarction) ([facility]     with 3 stents    [facility]        Past Surgical History:   Procedure Laterality Date    ABDOMINAL SURGERY      exploratory    ANGIOPLASTY      3 stents    ESOPHAGOGASTRODUODENOSCOPY N/A 10/2/2017    Procedure: ESOPHAGOGASTRODUODENOSCOPY (EGD); Surgeon: Jarrett Bruner MD;  Location: [facility]; Service: Gastroenterology    PROSTATE SURGERY         Family History   Problem Relation Age of Onset    Heart disease Father     Other Father         Mesothelioma      I have reviewed and agree with the history as documented      Social History     Tobacco Use    Smoking status: Former Smoker     Packs/day: 1 50     Years: 50 00     Pack years: 75 00     Last attempt to quit: 9/13/2017     Years since quittin 9    Smokeless tobacco: Never Used   Substance Use Topics    Alcohol use: Not Currently     Frequency: Never     Binge frequency: Never    Drug use: No        Review of Systems   Constitutional: Negative for chills, diaphoresis and fever  Respiratory: On home O2  No change in cough, SOB from baseline  Cardiovascular: Negative for chest pain and leg swelling  Gastrointestinal: Negative for abdominal distention, abdominal pain, diarrhea, nausea and vomiting  Musculoskeletal: Positive for joint swelling (R elbow)  Negative for arthralgias  Skin: Negative for rash and wound  Neurological: Negative for dizziness, syncope, weakness, light-headedness and headaches  All other systems reviewed and are negative  Physical Exam  ED Triage Vitals [19 181]   Temperature Pulse Respirations Blood Pressure SpO2   98 9 °F (37 2 °C) 98 22 156/77 94 %      Temp Source Heart Rate Source Patient Position - Orthostatic VS BP Location FiO2 (%)   Oral Monitor Lying Left arm --      Pain Score       Worst Possible Pain             Orthostatic Vital Signs  Vitals:    19 1811 19 1900   BP: 156/77 148/72   Pulse: 98 98   Patient Position - Orthostatic VS: Lying Lying       Physical Exam   Constitutional: He is oriented to person, place, and time  He appears well-developed and well-nourished  No distress  HENT:   Head: Normocephalic and atraumatic  Cardiovascular: Normal rate  Exam reveals no gallop and no friction rub  No murmur heard  Pulmonary/Chest: Effort normal  He has no wheezes  He has no rales  Globally reduced breath sounds   Abdominal: Soft  Bowel sounds are normal  He exhibits no distension  There is no tenderness  Musculoskeletal: Normal range of motion  Moderate swelling to the right olecranon bursa  Mild tenderness to palpation  Warm but not erythematous  Neurological: He is alert and oriented to person, place, and time  Skin: Skin is warm and dry  No erythema  Psychiatric: He has a normal mood and affect  His behavior is normal    Nursing note and vitals reviewed  ED Medications  Medications - No data to display    Diagnostic Studies  Results Reviewed     None                 No orders to display         Procedures  Procedures        ED Course           Identification of Seniors at Risk      Most Recent Value   (ISAR) Identification of Seniors at Risk   Before the illness or injury that brought you to the Emergency, did you need someone to help you on a regular basis? 0 Filed at: 08/14/2019 1813   In the last 24 hours, have you needed more help than usual?  0 Filed at: 08/14/2019 1813   Have you been hospitalized for one or more nights during the past 6 months? 1 Filed at: 08/14/2019 1813   In general, do you see well?  0 Filed at: 08/14/2019 1813   In general, do you have serious problems with your memory? 0 Filed at: 08/14/2019 1813   Do you take more than three different medications every day? 1 Filed at: 08/14/2019 1813   ISAR Score  2 Filed at: 08/14/2019 1813              Initial Sepsis Screening     Row Name 08/14/19 1834                Is the patient's history suggestive of a new or worsening infection? No  -AR        Suspected source of infection  --        Are two or more of the following signs & symptoms of infection both present and new to the patient?  --        Indicate SIRS criteria  --        If the answer is yes to both questions, suspicion of sepsis is present  --        If severe sepsis is present AND tissue hypoperfusion perists in the hour after fluid resuscitation or lactate > 4, the patient meets criteria for SEPTIC SHOCK  --        Are any of the following organ dysfunction criteria present within 6 hours of suspected infection and SIRS criteria that are NOT considered to be chronic conditions?   --        Organ dysfunction  --        Date of presentation of severe sepsis  --        Time of presentation of severe sepsis  -- Tissue hypoperfusion persists in the hour after crystalloid fluid administration, evidenced, by either:  --        Was hypotension present within one hour of the conclusion of crystalloid fluid administration?  --        Date of presentation of septic shock  --        Time of presentation of septic shock  --          User Key  (r) = Recorded By, (t) = Taken By, (c) = Cosigned By    [address] Name Provider Type    JORJE Gordon DO Physician                  MDM  Number of Diagnoses or Management Options  Olecranon bursitis of right elbow: new and does not require workup  Diagnosis management comments: 72 YOM with swelling of right elbow  Clinically appears to be olecranon bursitis  No clinical suspicion for septic joint  Advised ACE wrap, ice, diclofenac gel, ortho follow up  Return precautions given  I gave oral return precautions for what to return for in addition to the written return precautions  The patient verbalized understanding of the discharge instructions and warnings that would necessitate return to the Emergency Department  I specifically highlighted areas of special concern regarding the written and verbal discharge instructions and return precautions  All questions were answered prior to discharge         Amount and/or Complexity of Data Reviewed  Decide to obtain previous medical records or to obtain history from someone other than the patient: yes  Review and summarize past medical records: yes  Discuss the patient with other providers: yes    Risk of Complications, Morbidity, and/or Mortality  Presenting problems: low  Diagnostic procedures: low  Management options: low    Patient Progress  Patient progress: stable      Disposition  Final diagnoses:   Olecranon bursitis of right elbow     Time reflects when diagnosis was documented in both MDM as applicable and the Disposition within this note     Time User Action Codes Description Comment    8/14/2019  7:03 PM Chelsea Barraza Add [M70 21] Olecranon bursitis of right elbow       ED Disposition     ED Disposition Condition Date/Time Comment    Discharge Stable Wed Aug 14, 2019  7:03 PM Stan Francisco 244  discharge to home/self care              Follow-up Information     Follow up With Specialties Details Why Contact Info Additional Information    Viraj Dodge MD Family Medicine Schedule an appointment as soon as possible for a visit in 3 days  [address]  [phone] [address] Orthopedic Surgery Schedule an appointment as soon as possible for a visit in 3 days As needed [address] [phone]  [phone] [address] [place] [address], [place], [place], [phone]          Discharge Medication List as of 8/14/2019  7:09 PM      START taking these medications    Details   Diclofenac Sodium 2 % SOLN Place 2 Pump on the skin 2 (two) times a day, Starting Wed 8/14/2019, Print         CONTINUE these medications which have NOT CHANGED    Details   aspirin (ECOTRIN LOW STRENGTH) 81 mg EC tablet Take 1 tablet by mouth daily, Starting Tue 12/6/2016, Historical Med      azithromycin (ZITHROMAX) 250 mg tablet Take 1 tablet (250 mg total) by mouth 3 (three) times a week for 60 days, Starting Wed 7/31/2019, Until Sun 9/29/2019, Normal      BD INSULIN SYRINGE U-500 31G X 6MM 0 5 ML MISC USE 3 PER DAY AS DIRECTED WITH U-500 INSULIN, Normal      budesonide-formoterol (SYMBICORT) 160-4 5 mcg/act inhaler Inhale 2 puffs 2 (two) times a day, Historical Med      cyclobenzaprine (FLEXERIL) 10 mg tablet Take 10 mg by mouth 2 (two) times a day as needed, Historical Med      ferrous sulfate 325 (65 Fe) mg tablet Take 325 mg by mouth daily with breakfast, Historical Med      fluticasone (FLONASE) 50 mcg/act nasal spray 1 spray into each nostril daily, Starting Tue 2/13/2018, No Print      furosemide (LASIX) 40 mg tablet TAKE 1 TABLET BY MOUTH 2 (TWO) TIMES A DAY, Normal      insulin regular (HumuLIN R U-500) 500 units/mL CONCENTRATED injection Inject 0 09 mL (45 Units total) under the skin 2 (two) times a day before breakfast and lunch, Starting Mon 2/12/2018, No Print      Insulin Regular Human, Conc, (Insulin Regular Human, Conc,) 500 units/mL CONCENTRATED U-500 injection pen Inject 35 Units under the skin daily with dinner, Historical Med      levalbuterol (XOPENEX) 1 25 mg/3 mL nebulizer solution USE 1 VIAL IN NEBULIZER THREE TIMES A DAY, Historical Med      metoprolol tartrate (LOPRESSOR) 25 mg tablet Take 1 tablet by mouth 2 (two) times a day, Historical Med      Omega-3 Fatty Acids (FISH OIL) 645 MG CAPS Take 1 capsule by mouth 2 (two) times a day, Historical Med      oxyCODONE-acetaminophen (PERCOCET) 7 5-325 MG per tablet Take by mouth every 4 (four) hours, Starting Tue 3/21/2017, Historical Med      pantoprazole (PROTONIX) 40 mg tablet Take 1 tablet by mouth 2 (two) times a day before meals, Starting Thu 10/5/2017, Print      potassium chloride (K-DUR,KLOR-CON) 20 mEq tablet Take 1 tablet (20 mEq total) by mouth daily, Starting Thu 12/6/2018, Print      simvastatin (ZOCOR) 40 mg tablet Take 40 mg by mouth daily, Starting Thu 3/15/2018, Historical Med      tamsulosin (FLOMAX) 0 4 mg Take 1 capsule (0 4 mg total) by mouth daily with dinner, Starting Sat 5/26/2018, Print      tiotropium (SPIRIVA) 18 mcg inhalation capsule Place 18 mcg into inhaler and inhale daily, Historical Med           No discharge procedures on file  ED Provider  Attending physically available and evaluated Isaías Raymundo I managed the patient along with the ED Attending      Electronically Signed by         Felisha Ackerman MD  08/14/19 4066

## 2019-08-14 NOTE — DISCHARGE INSTRUCTIONS
You came to the emergency department with swelling of your right elbow  Based on your clinical exam, you likely have what is known as olecranon bursitis  This is an inflammation and swelling of the sac around your joint  There is no need to drain the fluid at this time  We are sending you with a prescription for diclofenac to apply to the affected area  Use as directed  You can also keep an ACE bandage wrapped around the elbow and also use ice which will help reduce the swelling  Please follow up with orthopaedics for further management of this problem as needed  Follow up with your primary care doctor in 3 days  Please return to the emergency department if you develop fevers, pain with moving the elbow, or anything else concerning to you

## 2019-08-14 NOTE — ED ATTENDING ATTESTATION
Juliane Walls DO, saw and evaluated the patient  I have discussed the patient with the resident/non-physician practitioner and agree with the resident's/non-physician practitioner's findings, Plan of Care, and MDM as documented in the resident's/non-physician practitioner's note, except where noted  All available labs and Radiology studies were reviewed  I was present for key portions of any procedure(s) performed by the resident/non-physician practitioner and I was immediately available to provide assistance  At this point I agree with the current assessment done in the Emergency Department  I have conducted an independent evaluation of this patient a history and physical is as follows:    Patient is 72-year-old gentleman history of O2 dependent COPD, accompanied by his son  Yesterday and today he has noticed some swelling over the right elbow  No history of trauma, no fever, no chills but he does lean heavily on the elbow at times  It hurts worse when he leans on the elbow  When he is at rest and not putting pressure on the elbow he feels fine  Does not really hurt if he flexes or bends at the elbow but only when he put pressure on the red area over the olecranon  No fever, no chills, no joint surgery, no joint injections  Otherwise feels at his baseline  General:  Patient is well-appearing  Head:  Atraumatic  Eyes:  Conjunctiva pink  ENT:  Mucous members are moist  Neck:  Supple  Cardiac:  S1-S2, without murmurs  Lungs:  Clear to auscultation bilaterally  Abdomen:  Soft, nontender, normal bowel sounds, no CVA tenderness, no tympany, no rigidity, no guarding  Extremities:  He has some swelling over the olecranon bursa, slightly warm, slightly red  There is no bony tenderness at the humeral head, humerus, elbow, forearm, wrist or hand  No pain with passive range of motion at the right shoulder, elbow, wrist    strengths are equal symmetric bilaterally  Compartments are soft    Radial pulses equal symmetric bilaterally  Arm is warm and well perfused  No edema or swelling except the elbow, when compared with the left  Neurologic:  Awake, fluent speech, normal comprehension  AAOx3  Skin:  Pink warm and dry, no petechia or purpura  Psychiatric:  Alert, pleasant, cooperative    Patient appears to have some mild olecranon bursitis    No concerns for septic arthritis, will treat conservatively          Critical Care Time  Procedures

## 2019-08-15 ENCOUNTER — TELEPHONE (OUTPATIENT)
Dept: PULMONOLOGY | Facility: CLINIC | Age: 74
End: 2019-08-15

## 2019-08-15 NOTE — TELEPHONE ENCOUNTER
Patient called yesterday saying he wanted more samples of Trelegy  He said he went to his pharmacy and it was too expensive  I told him normally, we give samples and you let us know if its working then we will send a script to the pharmacy so they probably didn't have the script yet  He said okay that it was helping, so the script was sent over to his pharmacy  He then said they were faxing us papers because it would be too expensive and then asked if he could come  another sample  I told him the office was now closed and I would let you know today  Can you please check up on this if he would need a DWO for trelegy and call patient with an update? Thank you!

## 2019-08-20 ENCOUNTER — TELEPHONE (OUTPATIENT)
Dept: NEPHROLOGY | Facility: CLINIC | Age: 74
End: 2019-08-20

## 2019-08-20 NOTE — TELEPHONE ENCOUNTER
Called pt to reschedule an appointment he missed on 8/15 with Dr Rainer Wilkins in [facility] but I was unable to leave a v/m I will try again later

## 2019-08-21 NOTE — TELEPHONE ENCOUNTER
Called pt to reschedule an appointment he missed on 8/15 with Dr Keerthi Dee in [facility] but I was unable to leave a v/m I will try again later

## 2019-08-22 ENCOUNTER — CLINICAL SUPPORT (OUTPATIENT)
Dept: PULMONOLOGY | Age: 74
End: 2019-08-22

## 2019-08-22 VITALS — HEIGHT: 72 IN | WEIGHT: 255 LBS | BODY MASS INDEX: 34.54 KG/M2

## 2019-08-22 DIAGNOSIS — J44.9 COPD, SEVERE (HCC): ICD-10-CM

## 2019-08-22 NOTE — TELEPHONE ENCOUNTER
Called pt to reschedule an appointment he missed on 8/15 with Dr Linh Moreno but I was unable to to leave a v/m  This is the 3rd phone call a letter will be mailed

## 2019-08-22 NOTE — PROGRESS NOTES
Jose Francisco       Dear Dr Lesley Lafleur    Thank you for referring your patient to our cardiac rehabilitation program  The patient has completed 0 visits  However, rehab is being discontinued at this time due to:    Voluntary Dropout:  The patient has chosen to quit due to :  Tyrese Lubin was evaluated today, but expecting to attend Pain Management (not [facility]) for the renewal of his pain meds  He is struggling with significant pain and his PCP referred him to [facility]  [facility]  [facility] but does not feel he can manage the exercises with his chronic pain and a co-pay of $20 per visit  He was willing to attempt [facility] again if he could get the pain meds  I explained the process to him, connected with his PCP and gave him the phone # to the [facility]  Please contact us at [phone] if you have any questions about this patents case or if/when it is appropriate to re-start the patients rehab program at a later date  Thank you for your continued support of cardiac rehabilitation         Sincerely,      Cayden Morales

## 2019-08-27 ENCOUNTER — LAB REQUISITION (OUTPATIENT)
Dept: LAB | Facility: HOSPITAL | Age: 74
End: 2019-08-27
Payer: COMMERCIAL

## 2019-08-27 DIAGNOSIS — E11.65 TYPE 2 DIABETES MELLITUS WITH HYPERGLYCEMIA (HCC): ICD-10-CM

## 2019-08-27 LAB
ALBUMIN SERPL BCP-MCNC: 3.5 G/DL (ref 3.5–5)
ALP SERPL-CCNC: 80 U/L (ref 46–116)
ALT SERPL W P-5'-P-CCNC: 17 U/L (ref 12–78)
ANION GAP SERPL CALCULATED.3IONS-SCNC: 6 MMOL/L (ref 4–13)
AST SERPL W P-5'-P-CCNC: 15 U/L (ref 5–45)
BILIRUB SERPL-MCNC: 0.27 MG/DL (ref 0.2–1)
BUN SERPL-MCNC: 28 MG/DL (ref 5–25)
CALCIUM SERPL-MCNC: 9.1 MG/DL (ref 8.3–10.1)
CHLORIDE SERPL-SCNC: 102 MMOL/L (ref 100–108)
CHOLEST SERPL-MCNC: 138 MG/DL (ref 50–200)
CO2 SERPL-SCNC: 35 MMOL/L (ref 21–32)
CREAT SERPL-MCNC: 1.13 MG/DL (ref 0.6–1.3)
EST. AVERAGE GLUCOSE BLD GHB EST-MCNC: 189 MG/DL
GFR SERPL CREATININE-BSD FRML MDRD: 65 ML/MIN/1.73SQ M
GLUCOSE P FAST SERPL-MCNC: 36 MG/DL (ref 65–99)
HBA1C MFR BLD: 8.2 % (ref 4.2–6.3)
HDLC SERPL-MCNC: 50 MG/DL (ref 40–60)
LDLC SERPL CALC-MCNC: 64 MG/DL (ref 0–100)
NONHDLC SERPL-MCNC: 88 MG/DL
POTASSIUM SERPL-SCNC: 4.1 MMOL/L (ref 3.5–5.3)
PROT SERPL-MCNC: 7.7 G/DL (ref 6.4–8.2)
SODIUM SERPL-SCNC: 143 MMOL/L (ref 136–145)
TRIGL SERPL-MCNC: 122 MG/DL

## 2019-08-27 PROCEDURE — 83036 HEMOGLOBIN GLYCOSYLATED A1C: CPT | Performed by: INTERNAL MEDICINE

## 2019-08-27 PROCEDURE — 80061 LIPID PANEL: CPT | Performed by: INTERNAL MEDICINE

## 2019-08-27 PROCEDURE — 80053 COMPREHEN METABOLIC PANEL: CPT | Performed by: INTERNAL MEDICINE

## 2019-09-03 DIAGNOSIS — Z79.4 TYPE 2 DIABETES MELLITUS WITH HYPERGLYCEMIA, WITH LONG-TERM CURRENT USE OF INSULIN (HCC): Chronic | ICD-10-CM

## 2019-09-03 DIAGNOSIS — E11.65 TYPE 2 DIABETES MELLITUS WITH HYPERGLYCEMIA, WITH LONG-TERM CURRENT USE OF INSULIN (HCC): Chronic | ICD-10-CM

## 2019-11-05 ENCOUNTER — OFFICE VISIT (OUTPATIENT)
Dept: PULMONOLOGY | Facility: CLINIC | Age: 74
End: 2019-11-05
Payer: COMMERCIAL

## 2019-11-05 VITALS
OXYGEN SATURATION: 97 % | SYSTOLIC BLOOD PRESSURE: 132 MMHG | TEMPERATURE: 98.2 F | DIASTOLIC BLOOD PRESSURE: 60 MMHG | HEART RATE: 80 BPM | WEIGHT: 257 LBS | BODY MASS INDEX: 34.81 KG/M2 | RESPIRATION RATE: 14 BRPM | HEIGHT: 72 IN

## 2019-11-05 DIAGNOSIS — J44.9 COPD, SEVERE (HCC): Primary | ICD-10-CM

## 2019-11-05 PROCEDURE — 99214 OFFICE O/P EST MOD 30 MIN: CPT | Performed by: INTERNAL MEDICINE

## 2019-11-05 RX ORDER — AZITHROMYCIN 500 MG/1
500 TABLET, FILM COATED ORAL 3 TIMES WEEKLY
Qty: 12 TABLET | Refills: 4 | Status: SHIPPED | OUTPATIENT
Start: 2019-11-06 | End: 2020-03-24

## 2019-11-05 NOTE — PROGRESS NOTES
Progress Note - Pulmonary   Reginaldo Ruiz Sr  69 y o  male MRN: [id number]   Encounter: [id number]      Assessment/Plan:  Patient is a 37-year-old male with past medical history significant for severe Chronic Obstructive Pulmonary Disease, history of tobacco abuse, severe pulmonary hypertension who presents for follow-up  Severe Chronic Obstructive Pulmonary Disease  -  continue trelogy;  sample provided  -  may use albuterol inhaler nebulizer on as-needed basis  -  start azithromycin 3 times weekly in setting of frequent exacerbations    Chronic hypoxic respiratory failure  -  continue 3 L nasal cannula to maintain SpO2 greater than 88%    Sleep apnea/hypoxemia  -  based on results of previous sleep study, patient does not qualify for sleep apnea but there were concerns for significant desaturation  -  discussed with patient he does not wish to pursue any further sleep study    Pulmonary hypertension - severe; chronic diastolic heart failure  -  appears euvolemic at this time  -  patient plans to follow-up with cardiology but has not seen them recently    Chronic back pain  -  defer management to PCP  -  limiting patient from participating in pulmonary rehab    Prophylaxis  -  patient plans to receive his influenza vaccine from his PCP    Patient may follow up in 6 months or sooner as necessary  Subjective: The patient reports he is unable to participate in pulmonary rehab because of back pain  He reports that he was previously on narcotics but these were discontinued  He has been evaluated by a spine specialist who said any procedure would be more dangerous  He reports his breathing is about stable  He is taking the Trelegy on a daily basis  He does feel like there is benefit  He is not using a regular rescue inhaler or nebulizers  He did not have a prescription for azithromycin  He will receive his annual influenza vaccination from his PCP        He has not recently seen his cardiologist but states he will soon  He denies LE swelling or weight gain  The patient is not interested in pursuing a sleep study  He quit smoking about 6 months ago  His last use of steroids for his breathing was about 1 month ago  He does report a history of frequent exacerbation  Inhaler Regimen:  Trelegy  Symbicort - ran out of Trelegy    Remainder of 12 point review of systems negative except as described in HPI  The following portions of the patient's history were reviewed and updated as appropriate: allergies, current medications, past family history, past medical history, past social history, past surgical history and problem list      Objective:   Vitals: Blood pressure 132/60, pulse 80, temperature 98 2 °F (36 8 °C), resp  rate 14, height 6' (1 829 m), weight 117 kg (257 lb), SpO2 97 %  , 3L NC, Body mass index is 34 86 kg/m²  Physical Exam  Gen: Awake, alert, oriented x 3, no acute distress  HEENT: Mucous membranes moist, no oral lesions, no thrush  NECK: No accessory muscle use, JVP not elevated  Cardiac: Regular, single S1, single S2, no murmurs, no rubs, no gallops  Lungs: diminished breath sounds through out  Abdomen: normoactive bowel sounds, soft nontender, nondistended, no rebound or rigidity, no guarding; obese  Extremities: no cyanosis, no clubbing, no edema  MSK:  Strength equal in all extremities  Derm:  No rashes/lesions noted  Neuro:  Appropriate mood/affect    Labs: I have personally reviewed pertinent lab results  Lab Results   Component Value Date    WBC 13 20 (H) 06/16/2019    WBC 9 79 09/09/2015    HGB 11 3 (L) 06/16/2019    HGB 13 5 09/09/2015     06/16/2019     09/09/2015     Lab Results   Component Value Date    CREATININE 1 13 08/27/2019    CREATININE 1 03 09/09/2015      Imaging and other studies: I have personally reviewed pertinent reports     and I have personally reviewed pertinent films in PACS  CXR 6/14/2019:  Cardiomediastinal silhouette appears unremarkable  The lungs are clear  No pneumothorax  Stable blunting of the right costophrenic angle likely representing chronic small effusion  Osseous structures appear within normal limits for patient age  Pulmonary Function Testing: I have personally reviewed pertinent reports  9/14/2016:  FVC                 1 75 36%  FEV1               1 02 29%  FEV1/FVC       58% 79%    Sleep Study 3/31/2017:  I have personally reviewed the report  Mr Sasha Leblanc did not meet criteria for obstructive sleep apnea (AHI - 2 6)  However, during one period of REM he had profound desaturation and REM AHI of 60  In addition, most of the night his O2 saturation stayed between 88-90% even when there were no respiratory events implying alveolar hypoventilation likely related to COPD  Due to profound desaturation and severe AHI in REM, I would recommend a repeat sleep study either in lab or if patient is more amenable then a home study  In addition, consideration to an ABG to rule out chronic hypercapnia may be helpful  ECHO:  I have personally reviewed the reports  6/19/2018: The left ventricle was normal in size and overall systolic function  The estimated ejection fraction was 65%  Regional left ventricular wall motion could not be assessed adequately, although was grossly normal      The right ventricular size and function were normal      There was mild tricuspid regurgitation  Severe pulmonary hypertension with and estimated pulmonary artery pressure of 70mm Hg      There was no pericardial effusion  Vasu Campa

## 2019-11-21 NOTE — SOCIAL WORK
Met with pt at bedside to explain CM role  Pt states his wife passed away this month and he resides with his son in a home with 1 step to enter bath on 1st floor and bedroom upstairs  Pt states he has a RW, cane, WC, commode, and shower chair at home  Pt states at home he does not use any assistive device currently but does use a electric shopping cart at stores  Pt states his PCP is Dr Pepito Ghotra and his RX is [facility] on [address]   Pt is on 3 L02 at home and this is provided by [employer]  Pt states he had [facility] in the past   I offered pt home care choice again and it is his choice to resume care with [facility] and ref made  Pt called [employer] while I was in the room and paid the $397 to turn the electric back on and electric now turned on  Pt denies mental health or substance abuse issues  Pt is aware he is for dc today but now states his son left for work and he has no transportation home and does not have his portable 02  Explained to pt that we would have to set up transportation and pt would get a bill  Pts NAHOMY Jacob will now be coming from [place] after she picks up her daughter from [facility]  Asked pt if she can stop at his house and get his portable 02 otherwise he would have to rent a portable tank  Pt refusing to pay for any additional tank rental and states he will be fine on the ride home to his house in [place]  I reiterated that he is here bc of 02 issues and pt still states he will not pay for any rental       CM reviewed d/c planning process including the following: identifying help at home, patient preference for d/c planning needs, Discharge Lounge, [facility] Meds to Bed program, availability of treatment team to discuss questions or concerns patient and/or family may have regarding understanding medications and recognizing signs and symptoms once discharged  CM also encouraged patient to follow up with all recommended appointments after discharge   Patient advised of importance for patient and family to participate in managing patients medical well being  Patient/caregiver received discharge checklist  Content reviewed  Patient/caregiver encouraged to participate in discharge plan of care prior to discharge home  pt going home to her own house

## 2019-12-09 ENCOUNTER — TRANSCRIBE ORDERS (OUTPATIENT)
Dept: LAB | Facility: HOSPITAL | Age: 74
End: 2019-12-09

## 2019-12-09 ENCOUNTER — APPOINTMENT (OUTPATIENT)
Dept: LAB | Facility: HOSPITAL | Age: 74
End: 2019-12-09
Payer: COMMERCIAL

## 2019-12-09 DIAGNOSIS — E11.649 UNCONTROLLED TYPE 2 DIABETES MELLITUS WITH HYPOGLYCEMIA, UNSPECIFIED HYPOGLYCEMIA COMA STATUS (HCC): Primary | ICD-10-CM

## 2019-12-09 DIAGNOSIS — E78.2 MIXED HYPERLIPIDEMIA: ICD-10-CM

## 2019-12-09 DIAGNOSIS — E11.649 UNCONTROLLED TYPE 2 DIABETES MELLITUS WITH HYPOGLYCEMIA, UNSPECIFIED HYPOGLYCEMIA COMA STATUS (HCC): ICD-10-CM

## 2019-12-09 LAB
ALBUMIN SERPL BCP-MCNC: 3.7 G/DL (ref 3.5–5)
ALP SERPL-CCNC: 84 U/L (ref 46–116)
ALT SERPL W P-5'-P-CCNC: 31 U/L (ref 12–78)
ANION GAP SERPL CALCULATED.3IONS-SCNC: 6 MMOL/L (ref 4–13)
AST SERPL W P-5'-P-CCNC: 27 U/L (ref 5–45)
BILIRUB SERPL-MCNC: 0.45 MG/DL (ref 0.2–1)
BUN SERPL-MCNC: 21 MG/DL (ref 5–25)
CALCIUM SERPL-MCNC: 9 MG/DL (ref 8.3–10.1)
CHLORIDE SERPL-SCNC: 100 MMOL/L (ref 100–108)
CHOLEST SERPL-MCNC: 149 MG/DL (ref 50–200)
CO2 SERPL-SCNC: 32 MMOL/L (ref 21–32)
CREAT SERPL-MCNC: 1.35 MG/DL (ref 0.6–1.3)
EST. AVERAGE GLUCOSE BLD GHB EST-MCNC: 200 MG/DL
GFR SERPL CREATININE-BSD FRML MDRD: 52 ML/MIN/1.73SQ M
GLUCOSE P FAST SERPL-MCNC: 285 MG/DL (ref 65–99)
HBA1C MFR BLD: 8.6 % (ref 4.2–6.3)
HDLC SERPL-MCNC: 52 MG/DL
LDLC SERPL CALC-MCNC: 60 MG/DL (ref 0–100)
NONHDLC SERPL-MCNC: 97 MG/DL
POTASSIUM SERPL-SCNC: 4.9 MMOL/L (ref 3.5–5.3)
PROT SERPL-MCNC: 7.7 G/DL (ref 6.4–8.2)
SODIUM SERPL-SCNC: 138 MMOL/L (ref 136–145)
TRIGL SERPL-MCNC: 184 MG/DL

## 2019-12-09 PROCEDURE — 36415 COLL VENOUS BLD VENIPUNCTURE: CPT

## 2019-12-09 PROCEDURE — 80053 COMPREHEN METABOLIC PANEL: CPT

## 2019-12-09 PROCEDURE — 83036 HEMOGLOBIN GLYCOSYLATED A1C: CPT

## 2019-12-09 PROCEDURE — 80061 LIPID PANEL: CPT

## 2020-03-10 ENCOUNTER — TRANSCRIBE ORDERS (OUTPATIENT)
Dept: LAB | Facility: HOSPITAL | Age: 75
End: 2020-03-10

## 2020-03-10 ENCOUNTER — APPOINTMENT (OUTPATIENT)
Dept: LAB | Facility: HOSPITAL | Age: 75
End: 2020-03-10
Payer: COMMERCIAL

## 2020-03-10 DIAGNOSIS — E78.2 MIXED HYPERLIPIDEMIA: Primary | ICD-10-CM

## 2020-03-10 DIAGNOSIS — E11.641 UNCONTROLLED TYPE 2 DIABETES MELLITUS WITH HYPOGLYCEMIA AND COMA (HCC): ICD-10-CM

## 2020-03-10 DIAGNOSIS — E78.2 MIXED HYPERLIPIDEMIA: ICD-10-CM

## 2020-03-10 LAB
ALBUMIN SERPL BCP-MCNC: 3 G/DL (ref 3.5–5)
ALP SERPL-CCNC: 87 U/L (ref 46–116)
ALT SERPL W P-5'-P-CCNC: 34 U/L (ref 12–78)
ANION GAP SERPL CALCULATED.3IONS-SCNC: 6 MMOL/L (ref 4–13)
AST SERPL W P-5'-P-CCNC: 35 U/L (ref 5–45)
BILIRUB SERPL-MCNC: 0.24 MG/DL (ref 0.2–1)
BUN SERPL-MCNC: 24 MG/DL (ref 5–25)
CALCIUM SERPL-MCNC: 8.9 MG/DL (ref 8.3–10.1)
CHLORIDE SERPL-SCNC: 100 MMOL/L (ref 100–108)
CHOLEST SERPL-MCNC: 157 MG/DL (ref 50–200)
CO2 SERPL-SCNC: 33 MMOL/L (ref 21–32)
CREAT SERPL-MCNC: 1.17 MG/DL (ref 0.6–1.3)
EST. AVERAGE GLUCOSE BLD GHB EST-MCNC: 226 MG/DL
GFR SERPL CREATININE-BSD FRML MDRD: 62 ML/MIN/1.73SQ M
GLUCOSE P FAST SERPL-MCNC: 239 MG/DL (ref 65–99)
HBA1C MFR BLD: 9.5 %
HDLC SERPL-MCNC: 40 MG/DL
LDLC SERPL CALC-MCNC: 74 MG/DL (ref 0–100)
NONHDLC SERPL-MCNC: 117 MG/DL
POTASSIUM SERPL-SCNC: 4.2 MMOL/L (ref 3.5–5.3)
PROT SERPL-MCNC: 7.4 G/DL (ref 6.4–8.2)
SODIUM SERPL-SCNC: 139 MMOL/L (ref 136–145)
TRIGL SERPL-MCNC: 216 MG/DL

## 2020-03-10 PROCEDURE — 83036 HEMOGLOBIN GLYCOSYLATED A1C: CPT

## 2020-03-10 PROCEDURE — 80061 LIPID PANEL: CPT

## 2020-03-10 PROCEDURE — 36415 COLL VENOUS BLD VENIPUNCTURE: CPT

## 2020-03-10 PROCEDURE — 80053 COMPREHEN METABOLIC PANEL: CPT

## 2020-03-17 DIAGNOSIS — E11.65 TYPE 2 DIABETES MELLITUS WITH HYPERGLYCEMIA, WITH LONG-TERM CURRENT USE OF INSULIN (HCC): Chronic | ICD-10-CM

## 2020-03-17 DIAGNOSIS — Z79.4 TYPE 2 DIABETES MELLITUS WITH HYPERGLYCEMIA, WITH LONG-TERM CURRENT USE OF INSULIN (HCC): Chronic | ICD-10-CM

## 2020-04-07 ENCOUNTER — TELEPHONE (OUTPATIENT)
Dept: FAMILY MEDICINE CLINIC | Facility: CLINIC | Age: 75
End: 2020-04-07

## 2020-04-17 DIAGNOSIS — G89.29 CHRONIC BILATERAL LOW BACK PAIN, UNSPECIFIED WHETHER SCIATICA PRESENT: ICD-10-CM

## 2020-04-17 DIAGNOSIS — E78.00 HYPERCHOLESTEROLEMIA: Primary | ICD-10-CM

## 2020-04-17 DIAGNOSIS — E11.9 DIABETES MELLITUS WITHOUT COMPLICATION (HCC): ICD-10-CM

## 2020-04-17 DIAGNOSIS — M54.50 CHRONIC BILATERAL LOW BACK PAIN, UNSPECIFIED WHETHER SCIATICA PRESENT: ICD-10-CM

## 2020-04-17 DIAGNOSIS — K21.9 GASTROESOPHAGEAL REFLUX DISEASE, ESOPHAGITIS PRESENCE NOT SPECIFIED: ICD-10-CM

## 2020-04-17 RX ORDER — PANTOPRAZOLE SODIUM 40 MG/1
TABLET, DELAYED RELEASE ORAL
Qty: 60 TABLET | Refills: 2 | Status: SHIPPED | OUTPATIENT
Start: 2020-04-17 | End: 2020-11-17

## 2020-04-17 RX ORDER — CYCLOBENZAPRINE HCL 10 MG
TABLET ORAL
Qty: 60 TABLET | Refills: 2 | Status: SHIPPED | OUTPATIENT
Start: 2020-04-17 | End: 2020-07-22

## 2020-04-17 RX ORDER — SIMVASTATIN 40 MG
TABLET ORAL
Qty: 60 TABLET | Refills: 2 | Status: SHIPPED | OUTPATIENT
Start: 2020-04-17 | End: 2020-11-17

## 2020-05-22 DIAGNOSIS — Z79.4 TYPE 2 DIABETES MELLITUS WITH HYPERGLYCEMIA, WITH LONG-TERM CURRENT USE OF INSULIN (HCC): Chronic | ICD-10-CM

## 2020-05-22 DIAGNOSIS — E11.65 TYPE 2 DIABETES MELLITUS WITH HYPERGLYCEMIA, WITH LONG-TERM CURRENT USE OF INSULIN (HCC): Chronic | ICD-10-CM

## 2020-05-22 DIAGNOSIS — I25.10 CORONARY ARTERY DISEASE INVOLVING NATIVE CORONARY ARTERY OF NATIVE HEART WITHOUT ANGINA PECTORIS: Primary | ICD-10-CM

## 2020-05-26 DIAGNOSIS — J96.11 CHRONIC RESPIRATORY FAILURE WITH HYPOXIA AND HYPERCAPNIA (HCC): Chronic | ICD-10-CM

## 2020-05-26 DIAGNOSIS — J96.12 CHRONIC RESPIRATORY FAILURE WITH HYPOXIA AND HYPERCAPNIA (HCC): Chronic | ICD-10-CM

## 2020-05-26 DIAGNOSIS — R35.0 URINARY FREQUENCY: ICD-10-CM

## 2020-05-26 RX ORDER — POTASSIUM CHLORIDE 20 MEQ/1
TABLET, EXTENDED RELEASE ORAL
Qty: 60 TABLET | Refills: 2 | Status: SHIPPED | OUTPATIENT
Start: 2020-05-26 | End: 2020-12-23

## 2020-05-26 RX ORDER — TAMSULOSIN HYDROCHLORIDE 0.4 MG/1
CAPSULE ORAL
Qty: 60 CAPSULE | Refills: 2 | Status: SHIPPED | OUTPATIENT
Start: 2020-05-26 | End: 2020-12-23

## 2020-06-11 ENCOUNTER — TELEPHONE (OUTPATIENT)
Dept: FAMILY MEDICINE CLINIC | Facility: CLINIC | Age: 75
End: 2020-06-11

## 2020-06-11 DIAGNOSIS — I50.33 ACUTE ON CHRONIC DIASTOLIC (CONGESTIVE) HEART FAILURE (HCC): ICD-10-CM

## 2020-06-11 RX ORDER — FUROSEMIDE 40 MG/1
40 TABLET ORAL 2 TIMES DAILY
Qty: 60 TABLET | Refills: 4 | Status: SHIPPED | OUTPATIENT
Start: 2020-06-11 | End: 2020-07-31 | Stop reason: HOSPADM

## 2020-06-11 RX ORDER — FUROSEMIDE 40 MG/1
TABLET ORAL
Qty: 60 TABLET | Refills: 4 | Status: SHIPPED | OUTPATIENT
Start: 2020-06-11 | End: 2020-06-11 | Stop reason: SDUPTHER

## 2020-07-07 DIAGNOSIS — J44.9 CHRONIC OBSTRUCTIVE PULMONARY DISEASE, UNSPECIFIED COPD TYPE (HCC): ICD-10-CM

## 2020-07-07 RX ORDER — FLUTICASONE FUROATE, UMECLIDINIUM BROMIDE AND VILANTEROL TRIFENATATE 100; 62.5; 25 UG/1; UG/1; UG/1
POWDER RESPIRATORY (INHALATION)
Qty: 60 EACH | Refills: 3 | Status: SHIPPED | OUTPATIENT
Start: 2020-07-07 | End: 2020-11-17

## 2020-07-22 DIAGNOSIS — E11.8 TYPE 2 DIABETES MELLITUS WITH COMPLICATION, WITH LONG-TERM CURRENT USE OF INSULIN (HCC): ICD-10-CM

## 2020-07-22 DIAGNOSIS — G89.29 CHRONIC BILATERAL LOW BACK PAIN, UNSPECIFIED WHETHER SCIATICA PRESENT: ICD-10-CM

## 2020-07-22 DIAGNOSIS — M54.50 CHRONIC BILATERAL LOW BACK PAIN, UNSPECIFIED WHETHER SCIATICA PRESENT: ICD-10-CM

## 2020-07-22 DIAGNOSIS — J96.12 CHRONIC RESPIRATORY FAILURE WITH HYPOXIA AND HYPERCAPNIA (HCC): Chronic | ICD-10-CM

## 2020-07-22 DIAGNOSIS — Z79.4 TYPE 2 DIABETES MELLITUS WITH COMPLICATION, WITH LONG-TERM CURRENT USE OF INSULIN (HCC): ICD-10-CM

## 2020-07-22 DIAGNOSIS — J96.11 CHRONIC RESPIRATORY FAILURE WITH HYPOXIA AND HYPERCAPNIA (HCC): Chronic | ICD-10-CM

## 2020-07-22 RX ORDER — CYCLOBENZAPRINE HCL 10 MG
TABLET ORAL
Qty: 60 TABLET | Refills: 2 | Status: SHIPPED | OUTPATIENT
Start: 2020-07-22 | End: 2020-11-17

## 2020-07-22 RX ORDER — INSULIN HUMAN 500 [IU]/ML
INJECTION, SOLUTION SUBCUTANEOUS
Qty: 20 ML | Refills: 3 | OUTPATIENT
Start: 2020-07-22 | End: 2020-08-21

## 2020-07-27 ENCOUNTER — APPOINTMENT (EMERGENCY)
Dept: RADIOLOGY | Facility: HOSPITAL | Age: 75
DRG: 291 | End: 2020-07-27
Payer: COMMERCIAL

## 2020-07-27 ENCOUNTER — HOSPITAL ENCOUNTER (INPATIENT)
Facility: HOSPITAL | Age: 75
LOS: 3 days | Discharge: HOME HEALTH | DRG: 291 | End: 2020-07-31
Attending: EMERGENCY MEDICINE | Admitting: INTERNAL MEDICINE
Payer: COMMERCIAL

## 2020-07-27 DIAGNOSIS — R06.00 DYSPNEA: ICD-10-CM

## 2020-07-27 DIAGNOSIS — N17.9 AKI (ACUTE KIDNEY INJURY) (HCC): ICD-10-CM

## 2020-07-27 DIAGNOSIS — J90 PLEURAL EFFUSION: ICD-10-CM

## 2020-07-27 DIAGNOSIS — J44.9 COPD, SEVERE (HCC): ICD-10-CM

## 2020-07-27 DIAGNOSIS — J44.1 COPD EXACERBATION (HCC): Primary | ICD-10-CM

## 2020-07-27 DIAGNOSIS — R91.1 PULMONARY NODULE: ICD-10-CM

## 2020-07-27 DIAGNOSIS — R91.1 LUNG NODULE: ICD-10-CM

## 2020-07-27 DIAGNOSIS — D38.1 NEOPLASM OF UNCERTAIN BEHAVIOR OF RIGHT UPPER LOBE OF LUNG: ICD-10-CM

## 2020-07-27 DIAGNOSIS — J90 PLEURAL EFFUSION ON RIGHT: ICD-10-CM

## 2020-07-27 LAB
ALBUMIN SERPL BCP-MCNC: 3.7 G/DL (ref 3.5–5)
ALP SERPL-CCNC: 102 U/L (ref 46–116)
ALT SERPL W P-5'-P-CCNC: 28 U/L (ref 12–78)
ANION GAP SERPL CALCULATED.3IONS-SCNC: 2 MMOL/L (ref 4–13)
AST SERPL W P-5'-P-CCNC: 34 U/L (ref 5–45)
BASOPHILS # BLD AUTO: 0.03 THOUSANDS/ΜL (ref 0–0.1)
BASOPHILS NFR BLD AUTO: 0 % (ref 0–1)
BILIRUB SERPL-MCNC: 0.24 MG/DL (ref 0.2–1)
BUN SERPL-MCNC: 28 MG/DL (ref 5–25)
CALCIUM SERPL-MCNC: 10.1 MG/DL (ref 8.3–10.1)
CHLORIDE SERPL-SCNC: 96 MMOL/L (ref 100–108)
CO2 SERPL-SCNC: 40 MMOL/L (ref 21–32)
CREAT SERPL-MCNC: 1.51 MG/DL (ref 0.6–1.3)
EOSINOPHIL # BLD AUTO: 0.03 THOUSAND/ΜL (ref 0–0.61)
EOSINOPHIL NFR BLD AUTO: 0 % (ref 0–6)
ERYTHROCYTE [DISTWIDTH] IN BLOOD BY AUTOMATED COUNT: 14.4 % (ref 11.6–15.1)
GFR SERPL CREATININE-BSD FRML MDRD: 45 ML/MIN/1.73SQ M
GLUCOSE SERPL-MCNC: 161 MG/DL (ref 65–140)
HCT VFR BLD AUTO: 45.1 % (ref 36.5–49.3)
HGB BLD-MCNC: 13.5 G/DL (ref 12–17)
IMM GRANULOCYTES # BLD AUTO: 0.13 THOUSAND/UL (ref 0–0.2)
IMM GRANULOCYTES NFR BLD AUTO: 1 % (ref 0–2)
LYMPHOCYTES # BLD AUTO: 0.97 THOUSANDS/ΜL (ref 0.6–4.47)
LYMPHOCYTES NFR BLD AUTO: 6 % (ref 14–44)
MCH RBC QN AUTO: 25.3 PG (ref 26.8–34.3)
MCHC RBC AUTO-ENTMCNC: 29.9 G/DL (ref 31.4–37.4)
MCV RBC AUTO: 85 FL (ref 82–98)
MONOCYTES # BLD AUTO: 0.71 THOUSAND/ΜL (ref 0.17–1.22)
MONOCYTES NFR BLD AUTO: 4 % (ref 4–12)
NEUTROPHILS # BLD AUTO: 14.41 THOUSANDS/ΜL (ref 1.85–7.62)
NEUTS SEG NFR BLD AUTO: 89 % (ref 43–75)
NRBC BLD AUTO-RTO: 0 /100 WBCS
PLATELET # BLD AUTO: 212 THOUSANDS/UL (ref 149–390)
PMV BLD AUTO: 10.7 FL (ref 8.9–12.7)
POTASSIUM SERPL-SCNC: 4.6 MMOL/L (ref 3.5–5.3)
PROT SERPL-MCNC: 9.2 G/DL (ref 6.4–8.2)
RBC # BLD AUTO: 5.34 MILLION/UL (ref 3.88–5.62)
SODIUM SERPL-SCNC: 138 MMOL/L (ref 136–145)
TROPONIN I SERPL-MCNC: <0.02 NG/ML
WBC # BLD AUTO: 16.28 THOUSAND/UL (ref 4.31–10.16)

## 2020-07-27 PROCEDURE — 71275 CT ANGIOGRAPHY CHEST: CPT

## 2020-07-27 PROCEDURE — 94760 N-INVAS EAR/PLS OXIMETRY 1: CPT

## 2020-07-27 PROCEDURE — 84484 ASSAY OF TROPONIN QUANT: CPT | Performed by: EMERGENCY MEDICINE

## 2020-07-27 PROCEDURE — 36415 COLL VENOUS BLD VENIPUNCTURE: CPT | Performed by: EMERGENCY MEDICINE

## 2020-07-27 PROCEDURE — 96365 THER/PROPH/DIAG IV INF INIT: CPT

## 2020-07-27 PROCEDURE — 93005 ELECTROCARDIOGRAM TRACING: CPT

## 2020-07-27 PROCEDURE — 71045 X-RAY EXAM CHEST 1 VIEW: CPT

## 2020-07-27 PROCEDURE — 99285 EMERGENCY DEPT VISIT HI MDM: CPT

## 2020-07-27 PROCEDURE — 96366 THER/PROPH/DIAG IV INF ADDON: CPT

## 2020-07-27 PROCEDURE — 94644 CONT INHLJ TX 1ST HOUR: CPT

## 2020-07-27 PROCEDURE — 80053 COMPREHEN METABOLIC PANEL: CPT | Performed by: EMERGENCY MEDICINE

## 2020-07-27 PROCEDURE — 96375 TX/PRO/DX INJ NEW DRUG ADDON: CPT

## 2020-07-27 PROCEDURE — 85025 COMPLETE CBC W/AUTO DIFF WBC: CPT | Performed by: EMERGENCY MEDICINE

## 2020-07-27 PROCEDURE — 94640 AIRWAY INHALATION TREATMENT: CPT

## 2020-07-27 PROCEDURE — 99285 EMERGENCY DEPT VISIT HI MDM: CPT | Performed by: EMERGENCY MEDICINE

## 2020-07-27 PROCEDURE — 83880 ASSAY OF NATRIURETIC PEPTIDE: CPT | Performed by: INTERNAL MEDICINE

## 2020-07-27 PROCEDURE — 94664 DEMO&/EVAL PT USE INHALER: CPT

## 2020-07-27 RX ORDER — SODIUM CHLORIDE FOR INHALATION 0.9 %
3 VIAL, NEBULIZER (ML) INHALATION ONCE
Status: COMPLETED | OUTPATIENT
Start: 2020-07-27 | End: 2020-07-27

## 2020-07-27 RX ORDER — ACETAMINOPHEN 325 MG/1
975 TABLET ORAL ONCE
Status: DISCONTINUED | OUTPATIENT
Start: 2020-07-27 | End: 2020-07-28

## 2020-07-27 RX ORDER — SODIUM CHLORIDE, SODIUM GLUCONATE, SODIUM ACETATE, POTASSIUM CHLORIDE, MAGNESIUM CHLORIDE, SODIUM PHOSPHATE, DIBASIC, AND POTASSIUM PHOSPHATE .53; .5; .37; .037; .03; .012; .00082 G/100ML; G/100ML; G/100ML; G/100ML; G/100ML; G/100ML; G/100ML
500 INJECTION, SOLUTION INTRAVENOUS ONCE
Status: COMPLETED | OUTPATIENT
Start: 2020-07-27 | End: 2020-07-28

## 2020-07-27 RX ORDER — METHYLPREDNISOLONE SODIUM SUCCINATE 125 MG/2ML
125 INJECTION, POWDER, LYOPHILIZED, FOR SOLUTION INTRAMUSCULAR; INTRAVENOUS ONCE
Status: COMPLETED | OUTPATIENT
Start: 2020-07-27 | End: 2020-07-27

## 2020-07-27 RX ADMIN — ALBUTEROL SULFATE 10 MG: 2.5 SOLUTION RESPIRATORY (INHALATION) at 20:51

## 2020-07-27 RX ADMIN — IPRATROPIUM BROMIDE 1 MG: 0.5 SOLUTION RESPIRATORY (INHALATION) at 20:51

## 2020-07-27 RX ADMIN — SODIUM CHLORIDE, SODIUM GLUCONATE, SODIUM ACETATE, POTASSIUM CHLORIDE, MAGNESIUM CHLORIDE, SODIUM PHOSPHATE, DIBASIC, AND POTASSIUM PHOSPHATE 500 ML: .53; .5; .37; .037; .03; .012; .00082 INJECTION, SOLUTION INTRAVENOUS at 22:18

## 2020-07-27 RX ADMIN — ISODIUM CHLORIDE 3 ML: 0.03 SOLUTION RESPIRATORY (INHALATION) at 20:51

## 2020-07-27 RX ADMIN — IOHEXOL 85 ML: 350 INJECTION, SOLUTION INTRAVENOUS at 22:31

## 2020-07-27 RX ADMIN — METHYLPREDNISOLONE SODIUM SUCCINATE 125 MG: 125 INJECTION, POWDER, FOR SOLUTION INTRAMUSCULAR; INTRAVENOUS at 20:43

## 2020-07-28 ENCOUNTER — APPOINTMENT (INPATIENT)
Dept: RADIOLOGY | Facility: HOSPITAL | Age: 75
DRG: 291 | End: 2020-07-28
Payer: COMMERCIAL

## 2020-07-28 ENCOUNTER — TELEPHONE (OUTPATIENT)
Dept: FAMILY MEDICINE CLINIC | Facility: CLINIC | Age: 75
End: 2020-07-28

## 2020-07-28 DIAGNOSIS — J96.11 CHRONIC RESPIRATORY FAILURE WITH HYPOXIA AND HYPERCAPNIA (HCC): Chronic | ICD-10-CM

## 2020-07-28 DIAGNOSIS — E11.8 TYPE 2 DIABETES MELLITUS WITH COMPLICATION, WITH LONG-TERM CURRENT USE OF INSULIN (HCC): ICD-10-CM

## 2020-07-28 DIAGNOSIS — Z79.4 TYPE 2 DIABETES MELLITUS WITH COMPLICATION, WITH LONG-TERM CURRENT USE OF INSULIN (HCC): ICD-10-CM

## 2020-07-28 DIAGNOSIS — J96.12 CHRONIC RESPIRATORY FAILURE WITH HYPOXIA AND HYPERCAPNIA (HCC): Chronic | ICD-10-CM

## 2020-07-28 PROBLEM — R91.1 LUNG NODULE: Status: ACTIVE | Noted: 2020-07-28

## 2020-07-28 LAB
ANION GAP SERPL CALCULATED.3IONS-SCNC: 6 MMOL/L (ref 4–13)
ATRIAL RATE: 138 BPM
BUN SERPL-MCNC: 27 MG/DL (ref 5–25)
CALCIUM SERPL-MCNC: 8.5 MG/DL (ref 8.3–10.1)
CHLORIDE SERPL-SCNC: 94 MMOL/L (ref 100–108)
CO2 SERPL-SCNC: 37 MMOL/L (ref 21–32)
CREAT SERPL-MCNC: 1.41 MG/DL (ref 0.6–1.3)
ERYTHROCYTE [DISTWIDTH] IN BLOOD BY AUTOMATED COUNT: 14.5 % (ref 11.6–15.1)
GFR SERPL CREATININE-BSD FRML MDRD: 49 ML/MIN/1.73SQ M
GLUCOSE FLD-MCNC: 330 MG/DL
GLUCOSE SERPL-MCNC: 197 MG/DL (ref 65–140)
GLUCOSE SERPL-MCNC: 272 MG/DL (ref 65–140)
GLUCOSE SERPL-MCNC: 359 MG/DL (ref 65–140)
GLUCOSE SERPL-MCNC: 375 MG/DL (ref 65–140)
GLUCOSE SERPL-MCNC: 446 MG/DL (ref 65–140)
HCT VFR BLD AUTO: 39.7 % (ref 36.5–49.3)
HGB BLD-MCNC: 11.8 G/DL (ref 12–17)
HISTIOCYTES NFR FLD: 3 %
LDH FLD L TO P-CCNC: 83 U/L
LYMPHOCYTES NFR BLD AUTO: 92 %
MCH RBC QN AUTO: 25.5 PG (ref 26.8–34.3)
MCHC RBC AUTO-ENTMCNC: 29.7 G/DL (ref 31.4–37.4)
MCV RBC AUTO: 86 FL (ref 82–98)
MONOCYTES NFR BLD AUTO: 5 %
NT-PROBNP SERPL-MCNC: 302 PG/ML
PH BODY FLUID: 7.5
PLATELET # BLD AUTO: 180 THOUSANDS/UL (ref 149–390)
PMV BLD AUTO: 11.2 FL (ref 8.9–12.7)
POTASSIUM SERPL-SCNC: 5.3 MMOL/L (ref 3.5–5.3)
PROCALCITONIN SERPL-MCNC: 0.18 NG/ML
PROT FLD-MCNC: 4.3 G/DL
QRS AXIS: 116 DEGREES
QRSD INTERVAL: 124 MS
QT INTERVAL: 340 MS
QTC INTERVAL: 500 MS
RBC # BLD AUTO: 4.63 MILLION/UL (ref 3.88–5.62)
RBC # FLD MANUAL: 8000 /UL
SARS-COV-2 RNA RESP QL NAA+PROBE: NEGATIVE
SODIUM SERPL-SCNC: 137 MMOL/L (ref 136–145)
T WAVE AXIS: 47 DEGREES
TOTAL CELLS COUNTED SPEC: 100
VENTRICULAR RATE: 130 BPM
WBC # BLD AUTO: 10.75 THOUSAND/UL (ref 4.31–10.16)
WBC # FLD MANUAL: 2031 /UL

## 2020-07-28 PROCEDURE — 94640 AIRWAY INHALATION TREATMENT: CPT

## 2020-07-28 PROCEDURE — 88112 CYTOPATH CELL ENHANCE TECH: CPT | Performed by: PATHOLOGY

## 2020-07-28 PROCEDURE — 88341 IMHCHEM/IMCYTCHM EA ADD ANTB: CPT | Performed by: PATHOLOGY

## 2020-07-28 PROCEDURE — 84157 ASSAY OF PROTEIN OTHER: CPT | Performed by: INTERNAL MEDICINE

## 2020-07-28 PROCEDURE — 94760 N-INVAS EAR/PLS OXIMETRY 1: CPT

## 2020-07-28 PROCEDURE — 99223 1ST HOSP IP/OBS HIGH 75: CPT | Performed by: INTERNAL MEDICINE

## 2020-07-28 PROCEDURE — 99232 SBSQ HOSP IP/OBS MODERATE 35: CPT | Performed by: INTERNAL MEDICINE

## 2020-07-28 PROCEDURE — NC001 PR NO CHARGE: Performed by: INTERNAL MEDICINE

## 2020-07-28 PROCEDURE — 83986 ASSAY PH BODY FLUID NOS: CPT | Performed by: INTERNAL MEDICINE

## 2020-07-28 PROCEDURE — 88342 IMHCHEM/IMCYTCHM 1ST ANTB: CPT | Performed by: PATHOLOGY

## 2020-07-28 PROCEDURE — 84145 PROCALCITONIN (PCT): CPT | Performed by: INTERNAL MEDICINE

## 2020-07-28 PROCEDURE — 32555 ASPIRATE PLEURA W/ IMAGING: CPT | Performed by: INTERNAL MEDICINE

## 2020-07-28 PROCEDURE — 82945 GLUCOSE OTHER FLUID: CPT | Performed by: INTERNAL MEDICINE

## 2020-07-28 PROCEDURE — 87070 CULTURE OTHR SPECIMN AEROBIC: CPT | Performed by: INTERNAL MEDICINE

## 2020-07-28 PROCEDURE — 85027 COMPLETE CBC AUTOMATED: CPT | Performed by: INTERNAL MEDICINE

## 2020-07-28 PROCEDURE — 94664 DEMO&/EVAL PT USE INHALER: CPT

## 2020-07-28 PROCEDURE — 88305 TISSUE EXAM BY PATHOLOGIST: CPT | Performed by: PATHOLOGY

## 2020-07-28 PROCEDURE — 89050 BODY FLUID CELL COUNT: CPT | Performed by: INTERNAL MEDICINE

## 2020-07-28 PROCEDURE — 0W993ZX DRAINAGE OF RIGHT PLEURAL CAVITY, PERCUTANEOUS APPROACH, DIAGNOSTIC: ICD-10-PCS | Performed by: INTERNAL MEDICINE

## 2020-07-28 PROCEDURE — 82948 REAGENT STRIP/BLOOD GLUCOSE: CPT

## 2020-07-28 PROCEDURE — 93010 ELECTROCARDIOGRAM REPORT: CPT | Performed by: INTERNAL MEDICINE

## 2020-07-28 PROCEDURE — 83615 LACTATE (LD) (LDH) ENZYME: CPT | Performed by: INTERNAL MEDICINE

## 2020-07-28 PROCEDURE — U0003 INFECTIOUS AGENT DETECTION BY NUCLEIC ACID (DNA OR RNA); SEVERE ACUTE RESPIRATORY SYNDROME CORONAVIRUS 2 (SARS-COV-2) (CORONAVIRUS DISEASE [COVID-19]), AMPLIFIED PROBE TECHNIQUE, MAKING USE OF HIGH THROUGHPUT TECHNOLOGIES AS DESCRIBED BY CMS-2020-01-R: HCPCS | Performed by: INTERNAL MEDICINE

## 2020-07-28 PROCEDURE — 89051 BODY FLUID CELL COUNT: CPT | Performed by: INTERNAL MEDICINE

## 2020-07-28 PROCEDURE — 87205 SMEAR GRAM STAIN: CPT | Performed by: INTERNAL MEDICINE

## 2020-07-28 PROCEDURE — 71045 X-RAY EXAM CHEST 1 VIEW: CPT

## 2020-07-28 PROCEDURE — 80048 BASIC METABOLIC PNL TOTAL CA: CPT | Performed by: INTERNAL MEDICINE

## 2020-07-28 RX ORDER — ACETAMINOPHEN 325 MG/1
975 TABLET ORAL EVERY 8 HOURS SCHEDULED
Status: DISCONTINUED | OUTPATIENT
Start: 2020-07-28 | End: 2020-07-31 | Stop reason: HOSPADM

## 2020-07-28 RX ORDER — METHYLPREDNISOLONE SODIUM SUCCINATE 40 MG/ML
40 INJECTION, POWDER, LYOPHILIZED, FOR SOLUTION INTRAMUSCULAR; INTRAVENOUS EVERY 12 HOURS SCHEDULED
Status: DISCONTINUED | OUTPATIENT
Start: 2020-07-28 | End: 2020-07-28

## 2020-07-28 RX ORDER — FLUTICASONE PROPIONATE 50 MCG
1 SPRAY, SUSPENSION (ML) NASAL DAILY
Status: DISCONTINUED | OUTPATIENT
Start: 2020-07-28 | End: 2020-07-31 | Stop reason: HOSPADM

## 2020-07-28 RX ORDER — IPRATROPIUM BROMIDE AND ALBUTEROL SULFATE 2.5; .5 MG/3ML; MG/3ML
3 SOLUTION RESPIRATORY (INHALATION)
Status: DISCONTINUED | OUTPATIENT
Start: 2020-07-28 | End: 2020-07-28

## 2020-07-28 RX ORDER — BUDESONIDE 0.5 MG/2ML
0.5 INHALANT ORAL
Status: DISCONTINUED | OUTPATIENT
Start: 2020-07-28 | End: 2020-07-31 | Stop reason: HOSPADM

## 2020-07-28 RX ORDER — PREDNISONE 20 MG/1
40 TABLET ORAL DAILY
Status: DISCONTINUED | OUTPATIENT
Start: 2020-07-28 | End: 2020-07-28

## 2020-07-28 RX ORDER — PRAVASTATIN SODIUM 80 MG/1
80 TABLET ORAL
Status: DISCONTINUED | OUTPATIENT
Start: 2020-07-28 | End: 2020-07-31 | Stop reason: HOSPADM

## 2020-07-28 RX ORDER — TAMSULOSIN HYDROCHLORIDE 0.4 MG/1
0.4 CAPSULE ORAL DAILY
Status: DISCONTINUED | OUTPATIENT
Start: 2020-07-28 | End: 2020-07-31 | Stop reason: HOSPADM

## 2020-07-28 RX ORDER — FERROUS SULFATE 325(65) MG
325 TABLET ORAL
Status: DISCONTINUED | OUTPATIENT
Start: 2020-07-28 | End: 2020-07-31 | Stop reason: HOSPADM

## 2020-07-28 RX ORDER — PANTOPRAZOLE SODIUM 40 MG/1
40 TABLET, DELAYED RELEASE ORAL DAILY
Status: DISCONTINUED | OUTPATIENT
Start: 2020-07-28 | End: 2020-07-31 | Stop reason: HOSPADM

## 2020-07-28 RX ORDER — LEVALBUTEROL 1.25 MG/.5ML
1.25 SOLUTION, CONCENTRATE RESPIRATORY (INHALATION)
Status: DISCONTINUED | OUTPATIENT
Start: 2020-07-28 | End: 2020-07-31 | Stop reason: HOSPADM

## 2020-07-28 RX ORDER — FUROSEMIDE 40 MG/1
40 TABLET ORAL 2 TIMES DAILY
Status: DISCONTINUED | OUTPATIENT
Start: 2020-07-28 | End: 2020-07-29

## 2020-07-28 RX ORDER — CYCLOBENZAPRINE HCL 10 MG
10 TABLET ORAL 3 TIMES DAILY PRN
Status: DISCONTINUED | OUTPATIENT
Start: 2020-07-28 | End: 2020-07-31 | Stop reason: HOSPADM

## 2020-07-28 RX ORDER — LIDOCAINE 50 MG/G
1 PATCH TOPICAL DAILY
Status: DISCONTINUED | OUTPATIENT
Start: 2020-07-28 | End: 2020-07-31 | Stop reason: HOSPADM

## 2020-07-28 RX ORDER — GUAIFENESIN 600 MG
600 TABLET, EXTENDED RELEASE 12 HR ORAL EVERY 12 HOURS SCHEDULED
Status: DISCONTINUED | OUTPATIENT
Start: 2020-07-28 | End: 2020-07-31 | Stop reason: HOSPADM

## 2020-07-28 RX ORDER — ASPIRIN 81 MG/1
81 TABLET ORAL DAILY
Status: DISCONTINUED | OUTPATIENT
Start: 2020-07-28 | End: 2020-07-31 | Stop reason: HOSPADM

## 2020-07-28 RX ORDER — HEPARIN SODIUM 5000 [USP'U]/ML
5000 INJECTION, SOLUTION INTRAVENOUS; SUBCUTANEOUS EVERY 8 HOURS SCHEDULED
Status: DISCONTINUED | OUTPATIENT
Start: 2020-07-28 | End: 2020-07-31 | Stop reason: HOSPADM

## 2020-07-28 RX ADMIN — IPRATROPIUM BROMIDE 0.5 MG: 0.5 SOLUTION RESPIRATORY (INHALATION) at 07:26

## 2020-07-28 RX ADMIN — LEVALBUTEROL HYDROCHLORIDE 1.25 MG: 1.25 SOLUTION, CONCENTRATE RESPIRATORY (INHALATION) at 07:26

## 2020-07-28 RX ADMIN — METOPROLOL TARTRATE 25 MG: 25 TABLET, FILM COATED ORAL at 08:15

## 2020-07-28 RX ADMIN — GUAIFENESIN 600 MG: 600 TABLET, EXTENDED RELEASE ORAL at 21:53

## 2020-07-28 RX ADMIN — ASPIRIN 81 MG: 81 TABLET, COATED ORAL at 08:15

## 2020-07-28 RX ADMIN — BUDESONIDE 0.5 MG: 0.5 INHALANT RESPIRATORY (INHALATION) at 19:53

## 2020-07-28 RX ADMIN — PRAVASTATIN SODIUM 80 MG: 80 TABLET ORAL at 16:28

## 2020-07-28 RX ADMIN — INSULIN LISPRO 2 UNITS: 100 INJECTION, SOLUTION INTRAVENOUS; SUBCUTANEOUS at 16:25

## 2020-07-28 RX ADMIN — INSULIN LISPRO 12 UNITS: 100 INJECTION, SOLUTION INTRAVENOUS; SUBCUTANEOUS at 07:13

## 2020-07-28 RX ADMIN — IPRATROPIUM BROMIDE 0.5 MG: 0.5 SOLUTION RESPIRATORY (INHALATION) at 19:53

## 2020-07-28 RX ADMIN — FERROUS SULFATE TAB 325 MG (65 MG ELEMENTAL FE) 325 MG: 325 (65 FE) TAB at 08:15

## 2020-07-28 RX ADMIN — GUAIFENESIN 600 MG: 600 TABLET, EXTENDED RELEASE ORAL at 12:33

## 2020-07-28 RX ADMIN — HEPARIN SODIUM 5000 UNITS: 5000 INJECTION INTRAVENOUS; SUBCUTANEOUS at 05:07

## 2020-07-28 RX ADMIN — INSULIN HUMAN 45 UNITS: 500 INJECTION, SOLUTION SUBCUTANEOUS at 12:32

## 2020-07-28 RX ADMIN — HEPARIN SODIUM 5000 UNITS: 5000 INJECTION INTRAVENOUS; SUBCUTANEOUS at 16:24

## 2020-07-28 RX ADMIN — CYCLOBENZAPRINE HYDROCHLORIDE 10 MG: 10 TABLET, FILM COATED ORAL at 22:29

## 2020-07-28 RX ADMIN — FUROSEMIDE 40 MG: 40 TABLET ORAL at 17:29

## 2020-07-28 RX ADMIN — PANTOPRAZOLE SODIUM 40 MG: 40 TABLET, DELAYED RELEASE ORAL at 08:15

## 2020-07-28 RX ADMIN — HEPARIN SODIUM 5000 UNITS: 5000 INJECTION INTRAVENOUS; SUBCUTANEOUS at 22:22

## 2020-07-28 RX ADMIN — PREDNISONE 40 MG: 20 TABLET ORAL at 08:15

## 2020-07-28 RX ADMIN — METOPROLOL TARTRATE 25 MG: 25 TABLET, FILM COATED ORAL at 17:29

## 2020-07-28 RX ADMIN — LEVALBUTEROL HYDROCHLORIDE 1.25 MG: 1.25 SOLUTION, CONCENTRATE RESPIRATORY (INHALATION) at 19:53

## 2020-07-28 RX ADMIN — INSULIN LISPRO 4 UNITS: 100 INJECTION, SOLUTION INTRAVENOUS; SUBCUTANEOUS at 22:23

## 2020-07-28 RX ADMIN — TAMSULOSIN HYDROCHLORIDE 0.4 MG: 0.4 CAPSULE ORAL at 08:15

## 2020-07-28 RX ADMIN — INSULIN HUMAN 45 UNITS: 500 INJECTION, SOLUTION SUBCUTANEOUS at 08:11

## 2020-07-28 RX ADMIN — INSULIN LISPRO 10 UNITS: 100 INJECTION, SOLUTION INTRAVENOUS; SUBCUTANEOUS at 11:21

## 2020-07-28 RX ADMIN — ACETAMINOPHEN 975 MG: 325 TABLET, FILM COATED ORAL at 05:07

## 2020-07-28 NOTE — ED ATTENDING ATTESTATION
7/27/2020  Arnol JAMES MD, saw and evaluated the patient  I have discussed the patient with the resident/non-physician practitioner and agree with the resident's/non-physician practitioner's findings, Plan of Care, and MDM as documented in the resident's/non-physician practitioner's note, except where noted  All available labs and Radiology studies were reviewed  I was present for key portions of any procedure(s) performed by the resident/non-physician practitioner and I was immediately available to provide assistance  At this point I agree with the current assessment done in the Emergency Department  I have conducted an independent evaluation of this patient a history and physical is as follows:    68-year-old male history of COPD CAD status post PCI and stenting heart failure diabetes who presents with acute on chronic shortness of breath  Patient is on home oxygen 3 L at baseline  States that shortness of breath is increasing got worse the last hour states the pain is pressure-like in nature difficult and worsened when taking a deep breath  Vital signs reviewed patient is noted to be tachypneic with mild conversational dyspnea  Tachycardia noted    Heart is tachycardic rate rhythm without murmurs  Lungs poor inspiratory effort mild rhonchi bilaterally  Abdomen soft nontender nondistended normal bowel sounds no signs of peritonitis  Extremities no signs clubbing or edema no palpable cords  Impression: Dyspnea  Suspect patient is having a COPD exacerbation will start patient on Heart neb patient received steroids prior to arrival check ECG cardiac enzymes chest x-ray reassess  Patient did meet SIRS criteria in ED -  however I do not suspect sepsis at this time I believe patient's tachycardia is likely due to his respiratory distress as well as bronchodilator use            ED Course    Lab studies reviewed patient has a mild MARANDA mild hyperglycemia patient has elevated white count on CBC   Patient has a heart score of 6; wells criteria 1 5 for heart rate  Chest x-ray reviewed shows a large right pleural effusion  Patient still reports dyspnea at rest   Patient will be admitted to Medicine for further evaluation and management            Critical Care Time  Procedures

## 2020-07-28 NOTE — CONSULTS
PULMONOLOGY CONSULT NOTE     Name: Pavel Webster Sr    Age & Sex: 69 y o  male   MRN: [id number]  Unit/Bed#: [facility] 928-01   Encounter: [id number]        Reason for consultation:  Shortness of breath, lung nodule, right pleural effusion, chronic respiratory failure with hypoxia and hypercapnia, COPD    Requesting physician: Klaudia Soler DO    Assessment:     1  Chronic hypoxic respiratory failure on 3 L nasal cannula oxygen baseline  2  Very severe COPD - currently not in exacerbation  3  Severe pulmonary hypertension, group 3/pulmonary artery pressure 70 mmHg  4  Recurrent right-sided pleural effusion - currently moderate  5  1 3 cm pulmonary nodule - right apex  6  Likely underlying obesity hypoventilation syndrome  7  Likely underlying obstructive sleep apnea  8  History of nicotine dependence    Plan:    -patient currently not in COPD exacerbation  -continue supplemental oxygen and titrate as tolerated; in goal baseline 3 L with SpO2 > 88%  -discontinue prednisone 40 mg daily  -continue Atrovent/Xopenex  -add Pulmicort and Mucinex  -plan for thoracentesis with fluid studies  -recommend IR biopsy of pulmonary nodule  -Patient will need outpatient PET Scan and PFTs  -patient may benefit from CPAP at night  -continue respiratory protocol  -recommend increasing Lasix to 40 mg b i d  Per outpatient regimen  -given patient's smoking history and appearance of 1 3 cm right lung nodule not previously noted in CT scan in 2018 suspicion for malignancy  -patient has recurrent right-sided pleural effusion likely secondary to medical noncompliance with once daily diuretic use and unknown dietary compliance    Given patient's 1 3 cm lung nodule malignancy causing pleural effusion is on differential       History of Present Illness   HPI:  Pavel Webster Sr  is a 69 y o  male with very severe COPD, chronic hypoxic/hypercapnic respiratory failure on 3 L nasal cannula oxygen, severe pulmonary hypertension, insulin-dependent type 2 diabetes, chronic diastolic heart failure, CKD stage 3 presenting for 3 weeks of worsening shortness of breath and dyspnea on exertion  Symptoms have significantly worsened over the past 2-3 days eventually prompting the patient to present to the emergency department for evaluation  Patient reports on/off wheezing and cough without change in sputum production from baseline  Denies fever, chills, dizziness, lightheadedness, syncope, confusion, night sweats, significant weight loss, chest pain, abdominal pain, difficulty with bowel or bladder movement  Patient does report worsening abdominal distention with quadrant discomfort and intermittent edema bilateral lower extremities  Patient is compliant with trelegy inhaler at home  He takes Lasix 40 mg once daily if his did confirm prescribed 2 times daily  Patient states that if he takes his Lasix twice daily he is going to the bathroom too much for comfort  He received this meals from [employer] and unsure regarding salt content  He does not restrict his fluid intake  Patient does not have any pets at home denies any recent travel or sick contacts  CTA pulmonary embolism study on admission revealed 1 3 cm right lung apex pulmonary nodule  Previous CT scan in June 2018 revealed stable noncalcified nodules in the right upper lobe and lateral aspect of left lower lobe largest measuring 3 mm  CT scan also revealed moderate right-sided pleural effusion causing compressive atelectasis  Of note patient had hospitalization and February 2018 for right-sided pleural effusion requiring thoracentesis with removal of approximately 1000 cc of fluid  Review of systems:  12 point review of systems was completed and was otherwise negative except as listed in HPI      Pulmonary function testing/spirometry:                           9/14/2016:  FVC                 1 75 36%  FEV1               1 02 29%  FEV1/FVC       58% 79% Severe obstruction      Historical Information   Past Medical History:   Diagnosis Date    Abdominal pain     Cardiac disease     CHF (congestive heart failure) (HCC)     COPD (chronic obstructive pulmonary disease) (HCC)     Coronary artery disease     Diabetes mellitus (HCC)     GERD (gastroesophageal reflux disease)     Hyperlipidemia     Hypertension     MI [facility]ocardial infarction) [facility] [address] )     with 3 stents    [facility]      Past Surgical History:   Procedure Laterality Date    ABDOMINAL SURGERY      exploratory    ANGIOPLASTY      3 stents    APPENDECTOMY      COLONOSCOPY  2015    ESOPHAGOGASTRODUODENOSCOPY N/A 10/2/2017    Procedure: ESOPHAGOGASTRODUODENOSCOPY (EGD); Surgeon: Thomas Simon MD;  Location: [facility]; Service: Gastroenterology    KNEE CARTILAGE SURGERY      OTHER SURGICAL HISTORY      stent placement    PROSTATE SURGERY      SKIN GRAFT      Basal cell CA back     Family History   Problem Relation Age of Onset    Heart disease Father     Other Father         Mesothelioma        Occupational History:  Worked as a  and now retired    Social History:  Denies current alcohol use  Previously smoked 1 5-2 packs per day for 50 years  Quit 2017  Denies recreational or illicit drug use      Meds/Allergies   Current Facility-Administered Medications   Medication Dose Route Frequency    acetaminophen (TYLENOL) tablet 975 mg  975 mg Oral Q8H [facility]    aspirin (ECOTRIN LOW STRENGTH) EC tablet 81 mg  81 mg Oral Daily    ferrous sulfate tablet 325 mg  325 mg Oral Daily With Breakfast    fluticasone (FLONASE) 50 mcg/act nasal spray 1 spray  1 spray Nasal Daily    heparin (porcine) subcutaneous injection 5,000 Units  5,000 Units Subcutaneous Q8H [facility]    insulin lispro (HumaLOG) 100 units/mL subcutaneous injection 1-6 Units  1-6 Units Subcutaneous HS    insulin lispro (HumaLOG) 100 units/mL subcutaneous injection 2-12 Units  2-12 Units Subcutaneous TID AC    insulin regular (HumuLIN R U-500) 500 units/mL CONCENTRATED injection 45 Units  45 Units Subcutaneous BID before breakfast/lunch    ipratropium (ATROVENT) 0 02 % inhalation solution 0 5 mg  0 5 mg Nebulization TID    levalbuterol (XOPENEX) inhalation solution 1 25 mg  1 25 mg Nebulization TID    lidocaine (LIDODERM) 5 % patch 1 patch  1 patch Topical Daily    metoprolol tartrate (LOPRESSOR) tablet 25 mg  25 mg Oral BID    pantoprazole (PROTONIX) EC tablet 40 mg  40 mg Oral Daily    pravastatin (PRAVACHOL) tablet 80 mg  80 mg Oral Daily With Dinner    predniSONE tablet 40 mg  40 mg Oral Daily    tamsulosin (FLOMAX) capsule 0 4 mg  0 4 mg Oral Daily     Medications Prior to Admission   Medication    aspirin (ECOTRIN LOW STRENGTH) 81 mg EC tablet    cyclobenzaprine (FLEXERIL) 10 mg tablet    furosemide (LASIX) 40 mg tablet    insulin regular (HumuLIN R U-500) 500 units/mL CONCENTRATED injection    metoprolol tartrate (LOPRESSOR) 25 mg tablet    Omega-3 Fatty Acids (FISH OIL) 645 MG CAPS    ONE TOUCH ULTRA TEST test strip    oxyCODONE-acetaminophen (PERCOCET) 7 5-325 MG per tablet    potassium chloride (K-DUR,KLOR-CON) 20 mEq tablet    simvastatin (ZOCOR) 40 mg tablet    tamsulosin (FLOMAX) 0 4 mg    TRELEGY ELLIPTA 100-62 5-25 MCG/INH inhaler    ferrous sulfate 325 (65 Fe) mg tablet    fluticasone (FLONASE) 50 mcg/act nasal spray    Insulin Syringe/Needle U-500 (BD Insulin Syringe U-500) 31G X 6MM 0 5 ML MISC    levalbuterol (XOPENEX) 1 25 mg/3 mL nebulizer solution    pantoprazole (PROTONIX) 40 mg tablet     Allergies   Allergen Reactions    Metformin GI Intolerance       Vitals: Blood pressure 154/74, pulse 99, temperature 98 3 °F (36 8 °C), resp  rate 16, height 6' (1 829 m), weight 120 kg (264 lb 8 8 oz), SpO2 95 %  , Body mass index is 35 88 kg/m²        Intake/Output Summary (Last 24 hours) at 7/28/2020 0824  Last data filed at 7/28/2020 0510  Gross per 24 hour   Intake 718 ml   Output  Net 718 ml       Physical Exam   Constitutional: He is oriented to person, place, and time  He appears well-developed and well-nourished  Non-toxic appearance  He does not appear ill  No distress  Obese body habitus   HENT:   Head: Normocephalic and atraumatic  Eyes: Pupils are equal, round, and reactive to light  EOM are normal    Neck: No JVD present  No tracheal deviation present  Cardiovascular: Normal rate, regular rhythm, normal heart sounds and intact distal pulses  Exam reveals no gallop and no decreased pulses  No murmur heard  Pulmonary/Chest: No accessory muscle usage or stridor  No apnea, no tachypnea and no bradypnea  No respiratory distress  He has decreased breath sounds in the right middle field and the right lower field  He has no wheezes  He has no rhonchi  He has no rales  Chest wall is dull to percussion  He exhibits no tenderness, no edema, no deformity and no swelling  Poor effort  Abdominal: Soft  Bowel sounds are normal  He exhibits distension and ascites  There is no tenderness  There is no rebound and no guarding  Positive fluid wave   Musculoskeletal:        Right lower leg: He exhibits no tenderness and no edema  Left lower leg: He exhibits no tenderness and no edema  Dullness to percussion of right mid to right lower chest wall   Neurological: He is alert and oriented to person, place, and time  Skin: Skin is warm and dry  Capillary refill takes less than 2 seconds  No cyanosis  Vitals reviewed  Labs: I have personally reviewed pertinent lab results  , ABG: No results found for: PHART, ZCX5CHK, PO2ART, UQL0KVN, O4KGXOBX, BEART, SOURCE, BNP: No results found for: BNP, CBC:   Lab Results   Component Value Date    WBC 10 75 (H) 07/28/2020    HGB 11 8 (L) 07/28/2020    HCT 39 7 07/28/2020    MCV 86 07/28/2020     07/28/2020    MCH 25 5 (L) 07/28/2020    MCHC 29 7 (L) 07/28/2020    RDW 14 5 07/28/2020    MPV 11 2 07/28/2020    NRBC 0 07/27/2020   , CMP:   Lab Results   Component Value Date    SODIUM 137 07/28/2020    K 5 3 07/28/2020    CL 94 (L) 07/28/2020    CO2 37 (H) 07/28/2020    BUN 27 (H) 07/28/2020    CREATININE 1 41 (H) 07/28/2020    CALCIUM 8 5 07/28/2020    AST 34 07/27/2020    ALT 28 07/27/2020    ALKPHOS 102 07/27/2020    EGFR 49 07/28/2020   , PT/INR: No results found for: PT, INR, Troponin:   Lab Results   Component Value Date    TROPONINI <0 02 07/27/2020     Laboratory and Diagnostics  Results from last 7 days   Lab Units 07/28/20  0543 07/27/20  2043   WBC Thousand/uL 10 75* 16 28*   HEMOGLOBIN g/dL 11 8* 13 5   HEMATOCRIT % 39 7 45 1   PLATELETS Thousands/uL 180 212   NEUTROS PCT %  --  89*   MONOS PCT %  --  4     Results from last 7 days   Lab Units 07/28/20  0543 07/27/20  2043   SODIUM mmol/L 137 138   POTASSIUM mmol/L 5 3 4 6   CHLORIDE mmol/L 94* 96*   CO2 mmol/L 37* 40*   ANION GAP mmol/L 6 2*   BUN mg/dL 27* 28*   CREATININE mg/dL 1 41* 1 51*   CALCIUM mg/dL 8 5 10 1   GLUCOSE RANDOM mg/dL 375* 161*   ALT U/L  --  28   AST U/L  --  34   ALK PHOS U/L  --  102   ALBUMIN g/dL  --  3 7   TOTAL BILIRUBIN mg/dL  --  0 24               Results from last 7 days   Lab Units 07/27/20  2043   TROPONIN I ng/mL <0 02                         Results from last 7 days   Lab Units 07/28/20  0543   PROCALCITONIN ng/ml 0 18       ABG:   N/A        Imaging and other studies: I have personally reviewed pertinent reports  and I have personally reviewed pertinent films in PACS    EKG, Pathology, and Other Studies: I have personally reviewed pertinent reports  and I have personally reviewed pertinent films in PACS      Code Status: Level 1 - Full Code    VTE Pharmacologic Prophylaxis: Heparin  VTE Mechanical Prophylaxis: sequential compression device    Portions of the record may have been created with voice recognition software    Occasional wrong word or "sound a like" substitutions may have occurred due to the inherent limitations of voice recognition software  Read the chart carefully and recognize, using context, where substitutions have occurred      DO [facility]  Pulmonary/Critical Care Fellowship, PGY-4

## 2020-07-28 NOTE — SOCIAL WORK
[facility] unable to accept pt  CM notified pt of same  Pt requesting CM to find other accepting [place] [id number] agency--does not have preference for agency  [facility] able to accept pt

## 2020-07-28 NOTE — ASSESSMENT & PLAN NOTE
· Longstanding history of this, severe at baseline  · ? exacerbation however patient without worsening cough    Markedly improved s/p DuoNeb and IV Solu-Medrol provided in ED  · Will transition to oral steroid for now and continue nebulizers  · Continue Trelegy  · Pulmonology evaluation will be appreciated

## 2020-07-28 NOTE — ASSESSMENT & PLAN NOTE
Wt Readings from Last 3 Encounters:   07/28/20 120 kg (264 lb 8 8 oz)   11/05/19 117 kg (257 lb)   08/22/19 116 kg (255 lb)     · Increased right-sided pleural effusion is noted  · NT-BNP pro is to be obtained, low threshold to consider IV diuresis  · Monitor daily weights, I/O

## 2020-07-28 NOTE — RESPIRATORY THERAPY NOTE
RT Protocol Note  Timmy Part Sr  69 y o  male MRN: [id number]  Unit/Bed#: ED 23 Encounter: [id number]    Assessment    Active Problems:    * No active hospital problems   *      Home Pulmonary Medications:    Home Devices/Therapy: Home O2    Past Medical History:   Diagnosis Date    Abdominal pain     Cardiac disease     CHF (congestive heart failure) (HCC)     COPD (chronic obstructive pulmonary disease) (HCC)     Coronary artery disease     Diabetes mellitus (HCC)     GERD (gastroesophageal reflux disease)     Hyperlipidemia     Hypertension     MI (myocardial infarction) ([facility] )     with 3 stents    Prostate cancer ([facility] )      Social History     Socioeconomic History    Marital status: /Civil Union     Spouse name: None    Number of children: 1    Years of education: 8    Highest education level: None   Occupational History    None   Social Needs    Financial resource strain: None    Food insecurity:     Worry: None     Inability: None    Transportation needs:     Medical: None     Non-medical: None   Tobacco Use    Smoking status: Former Smoker     Packs/day: 1 50     Years: 50 00     Pack years: 75 00     Last attempt to quit: 2017     Years since quittin 8    Smokeless tobacco: Never Used   Substance and Sexual Activity    Alcohol use: Not Currently     Frequency: Never     Binge frequency: Never    Drug use: No    Sexual activity: Never     Partners: Female   Lifestyle    Physical activity:     Days per week: None     Minutes per session: None    Stress: None   Relationships    Social connections:     Talks on phone: None     Gets together: None     Attends Jewish service: None     Active member of club or organization: None     Attends meetings of clubs or organizations: None     Relationship status: None    Intimate partner violence:     Fear of current or ex partner: None     Emotionally abused: None     Physically abused: None     Forced sexual activity: None   Other Topics Concern    None   Social History Narrative    Most recent tobacco use screenin2018    Do you currently or have you served in the [employer]: No    Were you activated, into active duty, as a member of the [employer] or as a Reservist: No    Caffeine intake: Moderate    Alcohol intake: None    Marital status:     Number of children: 1    Education: 8    Occupation: retired    Sexual orientation: Heterosexual    General stress level: Medium    Live alone or with others: with others    Exercise level: None    Sexually active: No    Overweight: Yes    Obese: Yes    Guns present in home: No    Seat belts used routinely: Yes    Advance directive: No    Sunscreen used routinely: No    Smoke alarm in home: Yes    Legally blind in one or both eyes: No    Hard of hearing or deaf in one or both ears: No       Subjective         Objective    Physical Exam:   Assessment Type: During-treatment  General Appearance: Alert, Awake  Respiratory Pattern: Tachypneic, Normal  Bilateral Breath Sounds: Scattered, Expiratory wheezes, Diminished  Cough: None  O2 Device: 3LPM NC    Vitals:  Blood pressure 158/88, pulse (!) 122, temperature 98 2 °F (36 8 °C), temperature source Oral, resp  rate 20, weight 127 kg (279 lb), SpO2 96 %  Imaging and other studies: I have personally reviewed pertinent reports  O2 Device: 3LPM NC     Plan    Respiratory Plan: Home Bronchodilator Patient pathway        Resp Comments: (P) aResp Comments: (P) Assessed in ED during hour long heart neb, Pateint appears comfortably with no apparent distress or SOB  Patient does have some scattered exp wheezes, wears 3LPM at home O2 and also take breathing tx's   TID

## 2020-07-28 NOTE — ASSESSMENT & PLAN NOTE
Wt Readings from Last 3 Encounters:   07/28/20 120 kg (264 lb 8 8 oz)   11/05/19 117 kg (257 lb)   08/22/19 116 kg (255 lb)     · Increased right-sided pleural effusion is noted  · NT-BNP pro is to be obtained, low threshold to consider IV diuresis  Versus thoracentesis  Will follow-up with Pulmonary    · Monitor daily weights, I/O

## 2020-07-28 NOTE — ASSESSMENT & PLAN NOTE
· Multifactorial in setting of COPD, chronic diastolic heart failure, untreated JACQUELINE, suspect deconditioning    On 3 L NC at this time and remains at baseline  · Continue supplemental oxygen as above and titrate to maintain SaO2 88-94%  · Respiratory protocol and incentive spirometry

## 2020-07-28 NOTE — ASSESSMENT & PLAN NOTE
Lab Results   Component Value Date    HGBA1C 9 5 (H) 03/10/2020       No results for input(s): POCGLU in the last 72 hours  Blood Sugar Average: Last 72 hrs:  ·  follows with [facility] endocrinology regarding this  · For now continue U500 insulin 45 units b i d   Before breakfast/lunch  · Add SSI with Accu-Cheks and carb controlled diet  · Initiate hypoglycemia protocol

## 2020-07-28 NOTE — ASSESSMENT & PLAN NOTE
· Longstanding history of this, severe at baseline  ·   Markedly improved s/p DuoNeb and IV Solu-Medrol provided in ED  · Will transition to oral steroid for now and continue nebulizers  · Continue Trelegy  ·   Pulmonary evaluation regarding nodule and pleural effusion

## 2020-07-28 NOTE — PROGRESS NOTES
Progress Note - Grady Damico Sr  1945, 69 y o  male MRN: [id number]    Unit/Bed#: [facility] 928-01 Encounter: [id number]    Primary Care Provider: Rao Harrington MD   Date and time admitted to hospital: 7/27/2020  8:11 PM        * Shortness of breath  Assessment & Plan  · Patient presented with worsening shortness of breath shortly prior to arrival  · CTA PE study negative for pulmonary embolism however with moderate size right pleural effusion and new lung nodule noted  · Additionally COVID-19 nasal swab negative  · Consideration for possible COPD exacerbation and/or CHF exacerbation  Additionally pleural effusion may be playing role  · Currently on home 3L NC  · See plans below for further workup    Lung nodule  Assessment & Plan  · Patient noted with 1 3 cm right lung apex pulmonary nodule  A small nodule was noted on CT chest 06/2018  ·  pulmonary consult  Chronic respiratory failure with hypoxia and hypercapnia (HCC)  Assessment & Plan  · Multifactorial in setting of COPD, chronic diastolic heart failure, untreated JACQUELINE, suspect deconditioning  On 3 L NC at this time and remains at baseline  · Continue supplemental oxygen as above and titrate to maintain SaO2 88-94%  · Respiratory protocol and incentive spirometry    Chronic diastolic heart failure (HCC)  Assessment & Plan  Wt Readings from Last 3 Encounters:   07/28/20 120 kg (264 lb 8 8 oz)   11/05/19 117 kg (257 lb)   08/22/19 116 kg (255 lb)     · Increased right-sided pleural effusion is noted  · NT-BNP pro is to be obtained, low threshold to consider IV diuresis  Versus thoracentesis  Will follow-up with Pulmonary    · Monitor daily weights, I/O        Pleural effusion on right  Assessment & Plan  · Moderate right-sided pleural effusion is noted, consideration for CHF as cause however with changed in size lung nodule noted as well  · Appreciate pulmonology evaluation    Chronic kidney disease, stage 3 ([facility] )  Assessment & Plan  · Prior baseline noted at 1 2-1 4  · Monitor with BMP and will avoid/limit nephrotoxins    COPD, severe (HCC)  Assessment & Plan  · Longstanding history of this, severe at baseline  ·   Markedly improved s/p DuoNeb and IV Solu-Medrol provided in ED  · Will transition to oral steroid for now and continue nebulizers  · Continue Trelegy  ·   Pulmonary evaluation regarding nodule and pleural effusion  VTE Pharmacologic Prophylaxis:   Pharmacologic: Heparin  Mechanical VTE Prophylaxis in Place: No    Patient Centered Rounds: I have performed bedside rounds with nursing staff today  Time Spent for Care: 15 minutes  More than 50% of total time spent on counseling and coordination of care as described above  Current Length of Stay: 0 day(s)    Current Patient Status: Inpatient   Certification Statement: The patient will continue to require additional inpatient hospital stay due to Need to monitor symptoms      Code Status: Level 1 - Full Code      Subjective:   Patient uncomfortable  Discussed with Pulmonary plans to proceed with thoracentesis    Objective:     Vitals:   Temp (24hrs), Av 3 °F (36 8 °C), Min:98 2 °F (36 8 °C), Max:98 4 °F (36 9 °C)    Temp:  [98 2 °F (36 8 °C)-98 4 °F (36 9 °C)] 98 3 °F (36 8 °C)  HR:  [] 99  Resp:  [16-26] 16  BP: (145-187)/(69-88) 154/74  SpO2:  [92 %-99 %] 95 %  Body mass index is 35 88 kg/m²  Input and Output Summary (last 24 hours): Intake/Output Summary (Last 24 hours) at 2020 1046  Last data filed at 2020 0780  Gross per 24 hour   Intake 958 ml   Output    Net 958 ml       Physical Exam:     Physical Exam   Constitutional: He appears well-developed and well-nourished  HENT:   Head: Normocephalic  Eyes: Pupils are equal, round, and reactive to light  EOM are normal    Pulmonary/Chest: Effort normal  No stridor  No respiratory distress  He has wheezes  Abdominal: Soft  Bowel sounds are normal  He exhibits no distension  There is no tenderness  Musculoskeletal: Normal range of motion  He exhibits no edema  Skin: Skin is warm  Additional Data:     Labs:    Results from last 7 days   Lab Units 07/28/20  0543 07/27/20  2043   WBC Thousand/uL 10 75* 16 28*   HEMOGLOBIN g/dL 11 8* 13 5   HEMATOCRIT % 39 7 45 1   PLATELETS Thousands/uL 180 212   NEUTROS PCT %  --  89*   LYMPHS PCT %  --  6*   MONOS PCT %  --  4   EOS PCT %  --  0     Results from last 7 days   Lab Units 07/28/20  0543 07/27/20  2043   POTASSIUM mmol/L 5 3 4 6   CHLORIDE mmol/L 94* 96*   CO2 mmol/L 37* 40*   BUN mg/dL 27* 28*   CREATININE mg/dL 1 41* 1 51*   CALCIUM mg/dL 8 5 10 1   ALK PHOS U/L  --  102   ALT U/L  --  28   AST U/L  --  34           * I Have Reviewed All Lab Data Listed Above  * Additional Pertinent Lab Tests Reviewed:  All Labs Within Last 24 Hours Reviewed      Recent Cultures (last 7 days):           Last 24 Hours Medication List:     Current Facility-Administered Medications:  acetaminophen 975 mg Oral [facility] Marycruz Ryan, DO   aspirin 81 mg Oral Daily Marycruz Ryan, DO   ferrous sulfate 325 mg Oral Daily With Breakfast Marycruz Ryan, DO   fluticasone 1 spray Nasal Daily Marycruz Ryan, DO   heparin (porcine) 5,000 Units Subcutaneous Q8H [address] Marycruz Ryan, DO   insulin lispro 1-6 Units Subcutaneous HS Marycruz Ryan, DO   insulin lispro 2-12 Units Subcutaneous TID AC Marycruz Coreyving, DO   insulin regular 45 Units Subcutaneous BID before breakfast/lunch Marycruz Coreyving, DO   ipratropium 0 5 mg Nebulization TID Marycruz Ryan, DO   levalbuterol 1 25 mg Nebulization TID Marycruz Ryan, DO   lidocaine 1 patch Topical Daily Marycruz Ryan, DO   metoprolol tartrate 25 mg Oral BID Marycruz Ryan, DO   pantoprazole 40 mg Oral Daily Marycruz Ryan, DO   pravastatin 80 mg Oral Daily With [employer] Kiran, DO   predniSONE 40 mg Oral Daily Marycruz Ryan, DO   tamsulosin 0 4 mg Oral Daily Marycruz Ryan, DO        Today, Patient Was Seen By: Melissa Guzman DO    ** Please Note: Dictation voice to text software may have been used in the creation of this document   **

## 2020-07-28 NOTE — ASSESSMENT & PLAN NOTE
· Patient noted with 1 3 cm right lung apex pulmonary nodule    A small nodule was noted on CT chest 06/2018  · Appreciate pulmonology consultation for further recommendations regarding workup

## 2020-07-28 NOTE — PLAN OF CARE
Problem: PAIN - ADULT  Goal: Verbalizes/displays adequate comfort level or baseline comfort level  Description  Interventions:  - Encourage patient to monitor pain and request assistance  - Assess pain using appropriate pain scale  - Administer analgesics based on type and severity of pain and evaluate response  - Implement non-pharmacological measures as appropriate and evaluate response  - Consider cultural and social influences on pain and pain management  - Notify physician/advanced practitioner if interventions unsuccessful or patient reports new pain  Outcome: Progressing     Problem: SAFETY ADULT  Goal: Patient will remain free of falls  Description  INTERVENTIONS:  - Assess patient frequently for physical needs  -  Identify cognitive and physical deficits and behaviors that affect risk of falls    -  [place] fall precautions as indicated by assessment   - Educate patient/family on patient safety including physical limitations  - Instruct patient to call for assistance with activity based on assessment  - Modify environment to reduce risk of injury  - Consider OT/PT consult to assist with strengthening/mobility  Outcome: Progressing  Goal: Maintain or return to baseline ADL function  Description  INTERVENTIONS:  -  Assess patient's ability to carry out ADLs; assess patient's baseline for ADL function and identify physical deficits which impact ability to perform ADLs (bathing, care of mouth/teeth, toileting, grooming, dressing, etc )  - Assess/evaluate cause of self-care deficits   - Assess range of motion  - Assess patient's mobility; develop plan if impaired  - Assess patient's need for assistive devices and provide as appropriate  - Encourage maximum independence but intervene and supervise when necessary  - Involve family in performance of ADLs  - Assess for home care needs following discharge   - Consider OT consult to assist with ADL evaluation and planning for discharge  - Provide patient education as appropriate  Outcome: Progressing  Goal: Maintain or return mobility status to optimal level  Description  INTERVENTIONS:  - Assess patient's baseline mobility status (ambulation, transfers, stairs, etc )    - Identify cognitive and physical deficits and behaviors that affect mobility  - Identify mobility aids required to assist with transfers and/or ambulation (gait belt, sit-to-stand, lift, walker, cane, etc )  - [place] fall precautions as indicated by assessment  - Record patient progress and toleration of activity level on Mobility SBAR; progress patient to next Phase/Stage  - Instruct patient to call for assistance with activity based on assessment  - Consider rehabilitation consult to assist with strengthening/weightbearing, etc   Outcome: Progressing     Problem: Knowledge Deficit  Goal: Patient/family/caregiver demonstrates understanding of disease process, treatment plan, medications, and discharge instructions  Description  Complete learning assessment and assess knowledge base  Interventions:  - Provide teaching at level of understanding  - Provide teaching via preferred learning methods  Outcome: Progressing     Problem: Potential for Falls  Goal: Patient will remain free of falls  Description  INTERVENTIONS:  - Assess patient frequently for physical needs  -  Identify cognitive and physical deficits and behaviors that affect risk of falls    -  [place] fall precautions as indicated by assessment   - Educate patient/family on patient safety including physical limitations  - Instruct patient to call for assistance with activity based on assessment  - Modify environment to reduce risk of injury  - Consider OT/PT consult to assist with strengthening/mobility  Outcome: Progressing     Problem: Prexisting or High Potential for Compromised Skin Integrity  Goal: Skin integrity is maintained or improved  Description  INTERVENTIONS:  - Identify patients at risk for skin breakdown  - Assess and monitor skin integrity  - Assess and monitor nutrition and hydration status  - Monitor labs   - Assess for incontinence   - Turn and reposition patient  - Assist with mobility/ambulation  - Relieve pressure over bony prominences  - Avoid friction and shearing  - Provide appropriate hygiene as needed including keeping skin clean and dry  - Evaluate need for skin moisturizer/barrier cream  - Collaborate with interdisciplinary team   - Patient/family teaching  - Consider wound care consult   Outcome: Progressing

## 2020-07-28 NOTE — UTILIZATION REVIEW
Initial Clinical Review    Admission: Date/Time/Statement: Admission Orders (From admission, onward)     Ordered        07/28/20 0028  Inpatient Admission  Once                   Orders Placed This Encounter   Procedures    Inpatient Admission     Standing Status:   Standing     Number of Occurrences:   1     Order Specific Question:   Admitting Physician     Answer:   Natacha Steinberg [[id number]]     Order Specific Question:   Level of Care     Answer:   Med Surg [16]     Order Specific Question:   Estimated length of stay     Answer:   More than 2 Midnights     Order Specific Question:   Certification     Answer:   I certify that inpatient services are medically necessary for this patient for a duration of greater than two midnights  See H&P and MD Progress Notes for additional information about the patient's course of treatment  ED Arrival Information     Expected Arrival Acuity Means of Arrival Escorted By Service Admission Type    - 7/27/2020 20:07 Emergent [facility] Hospitalist Emergency    Arrival Complaint    sob        Chief Complaint   Patient presents with    Shortness of Breath     pt reports wearing 3L O2 at home, reports increased SOB for the last hour and chest pain that started three hours ago, reports h/o COPD and CHF      Assessment/Plan:   Mr Jeniffer Griggs is a 68 yo male who presents to the ED from home with c/o worsening SOB on evening of admission, it has been worsening over the last 2-3 days with increased wehzzeing and persistent non-prodcutive cough along with RUQ discomfort and back pain  PMH: severe COPD< chronic diastolic CHF, CAD with stenting x 3, chronic hypoxic resp failure on home oxygen at 3L, type 2 DM, CKD 3  In the ED he got DuoNeb and IV steroids with some improvement  CTA PE chest PE study was negative for PE but showed mod R side pleural effusion and R lung apex 1 3 cm nodule    He is admitted to INPATIENT status with SOB - possible CHF or COPD exacerbation - continue home oxygen at 3 L  Lung nodule - pulmonary consult  Pleural effusion on R - could be CHF or change in lung nodule size  Chronic hypoxic resp failure - supplemental O2  Chronic D-CHF - get NT-BNP  Severe COPD - change to oral steroids and continue Trelegy  7/28  Pulmonary Consult - Acute pulmonary insufficiency on chronic hypoxic respiratory failure, currently secondary to volume overload and right pleural effusion most likely secondary to CHF  Pt has been taking less than ordered lasix at home  Get R thoracentesis, OP PET scan and PFTs  Check and see if IR can get to nodule otherwise will need EBUS       7/28 Thoracentesis with 1000 cc serosanguinous fluid removed  Improvement        ED Triage Vitals   Temperature Pulse Respirations Blood Pressure SpO2   07/27/20 2020 07/27/20 2016 07/27/20 2016 07/27/20 2019 07/27/20 2016   98 2 °F (36 8 °C) (!) 131 (!) 26 158/88 92 %      Temp Source Heart Rate Source Patient Position - Orthostatic VS BP Location FiO2 (%)   07/27/20 2020 07/27/20 2016 -- -- --   Oral Monitor         Pain Score       07/27/20 2016       6        Wt Readings from Last 1 Encounters:   07/28/20 120 kg (264 lb 8 8 oz)     Additional Vital Signs:     07/28/20 1500  98 °F (36 7 °C)  96  17  154/87    97 %         07/28/20 0736              32  3 L/min  Nasal cannula   07/28/20 0734              32  3 L/min  Nasal cannula   07/28/20 0732            95 %         07/28/20 07:00:45  98 3 °F (36 8 °C)  99  16  154/74  101  97 %         07/28/20 04:04:30  98 4 °F (36 9 °C)  86  20  152/72  99  93 %  32  3 L/min  Nasal cannula   07/28/20 0030    100  21  187/81Abnormal   117  94 %  32  3 L/min  Nasal cannula   07/27/20 2300    102  21  157/69  99  95 %  32  3 L/min  Nasal cannula   07/27/20 2200    118Abnormal   26Abnormal   156/71  102  93 %  32  3 L/min  Nasal cannula   07/27/20 2100    120Abnormal   20  145/87  110  99 %  32  3 L/min  Nasal cannula 07/27/20 2055    122Abnormal   20      96 %         07/27/20 2052            96 %  32  3 L/min  Nasal cannula     Pertinent Labs/Diagnostic Test Results:     7/27 CXR - Right upper lobe nodule correlating with the CT findings  Moderate right pleural effusion  7/27 CTA ED Chest PE study - No evidence of pulmonary embolus  Large pulmonary nodule in the right lung      7/27 ECG - Sinus tachycardia  Right bundle branch block  Possible Lateral infarct (cited on or before 27-APR-2018)  Possible Inferior infarct (cited on or before 27-APR-2018)  Abnormal ECG    Results from last 7 days   Lab Units 07/28/20  0133   SARS-COV-2  Negative     Results from last 7 days   Lab Units 07/28/20  0543 07/27/20  2043   WBC Thousand/uL 10 75* 16 28*   HEMOGLOBIN g/dL 11 8* 13 5   HEMATOCRIT % 39 7 45 1   PLATELETS Thousands/uL 180 212   NEUTROS ABS Thousands/µL  --  14 41*         Results from last 7 days   Lab Units 07/28/20  0543 07/27/20  2043   SODIUM mmol/L 137 138   POTASSIUM mmol/L 5 3 4 6   CHLORIDE mmol/L 94* 96*   CO2 mmol/L 37* 40*   ANION GAP mmol/L 6 2*   BUN mg/dL 27* 28*   CREATININE mg/dL 1 41* 1 51*   EGFR ml/min/1 73sq m 49 45   CALCIUM mg/dL 8 5 10 1     Results from last 7 days   Lab Units 07/27/20  2043   AST U/L 34   ALT U/L 28   ALK PHOS U/L 102   TOTAL PROTEIN g/dL 9 2*   ALBUMIN g/dL 3 7   TOTAL BILIRUBIN mg/dL 0 24     Results from last 7 days   Lab Units 07/28/20  1101 07/28/20  0710   POC GLUCOSE mg/dl 359* 446*     Results from last 7 days   Lab Units 07/28/20  0543 07/27/20  2043   GLUCOSE RANDOM mg/dL 375* 161*     BETA-HYDROXYBUTYRATE   Date Value Ref Range Status   06/15/2019 0 2 <0 6 mmol/L Final      Results from last 7 days   Lab Units 07/27/20  2043   TROPONIN I ng/mL <0 02     Results from last 7 days   Lab Units 07/28/20  0543   PROCALCITONIN ng/ml 0 18     Results from last 7 days   Lab Units 07/27/20  2043   NT-PRO BNP pg/mL 302*     Results from last 7 days   Lab Units 07/28/20  1427   WBC FLUID /ul 2,031     ED Treatment:   Medication Administration from 07/27/2020 2007 to 07/28/2020 0344    Date/Time Order Dose Route Action   07/27/2020 2051 albuterol inhalation solution 10 mg 10 mg Nebulization Given   07/27/2020 2051 ipratropium (ATROVENT) 0 02 % inhalation solution 1 mg 1 mg Nebulization Given   07/27/2020 2051 sodium chloride 0 9 % inhalation solution 3 mL 3 mL Nebulization Given   07/27/2020 2043 methylPREDNISolone sodium succinate (Solu-MEDROL) injection 125 mg 125 mg Intravenous Given   07/27/2020 2218 multi-electrolyte (ISOLYTE-S PH 7 4) bolus 500 mL 500 mL Intravenous New Bag   07/27/2020 2231 iohexol (OMNIPAQUE) 350 MG/ML injection (MULTI-DOSE) 85 mL 85 mL Intravenous Given        Past Medical History:   Diagnosis Date    Abdominal pain     Cardiac disease     [facility]congest[facility] failure[facility])     COPD (chronic obstructive pulmonary disease) ([place] [address] )     Coronary artery disease     Diabetes mellitus ([place] [address] )     GERD (gastroesophageal reflux disease)     Hyperlipidemia     Hypertension     MI (myocardial infarction) ([place] [address] )     with 3 stents    Prostate cancer ([place] [address] )      Present on Admission:   Chronic diastolic heart failure (HCC)   Chronic respiratory failure with hypoxia and hypercapnia (HCC)   Chronic kidney disease, stage 3 (HCC)   COPD, severe (HCC)   Lung nodule   Shortness of breath   Pleural effusion on right    Admitting Diagnosis: Dyspnea [R06 00]  SOB (shortness of breath) [R06 02]  Pleural effusion [J90]  Pulmonary nodule [R91 1]  COPD exacerbation (HCC) [J44 1]  MARANDA (acute kidney injury) ([facility] ) [N17 9]     Age/Sex: 69 y o  male     Admission Orders:  Scheduled Medications:    Medications:  acetaminophen 975 mg Oral Q8H [address]   aspirin 81 mg Oral Daily   budesonide 0 5 mg Nebulization Q12H   ferrous sulfate 325 mg Oral Daily With Breakfast   fluticasone 1 spray Nasal Daily   guaiFENesin 600 mg Oral Q12H DRAKE   heparin (porcine) 5,000 Units Subcutaneous Q8H [address]   insulin lispro 1-6 Units Subcutaneous HS   insulin lispro 2-12 Units Subcutaneous TID AC   insulin regular 45 Units Subcutaneous BID before breakfast/lunch   ipratropium 0 5 mg Nebulization TID   levalbuterol 1 25 mg Nebulization TID   lidocaine 1 patch Topical Daily   metoprolol tartrate 25 mg Oral BID   pantoprazole 40 mg Oral Daily   pravastatin 80 mg Oral Daily With Dinner   tamsulosin 0 4 mg Oral Daily     Continuous IV Infusions:     PRN Meds:     SCDs  Oxygen via NC @ 3L   Up w/ assist   POC GLUCOSE AC/HS WITH SSI COVERAGE   Ambulate 3 x daily  CXR 7/28  Pleural fluid for culture  ADA, Cardiac diet   IP CONSULT TO CASE MANAGEMENT  IP CONSULT TO PULMONOLOGY    Network Utilization Review Department  [email or web address]  ATTENTION: Please call with any questions or concerns to [phone] and carefully listen to the prompts so that you are directed to the right person  All voicemails are confidential   Lizandro Garcia all requests for admission clinical reviews, approved or denied determinations and any other requests to dedicated fax number below belonging to the campus where the patient is receiving treatment   List of dedicated fax numbers for the Facilities:  [address] DENIALS (Administrative/Medical Necessity) [phone]   [address] (Maternity/NICU/Pediatrics) [phone]   [place] [phone]   Rusty Arreola [phone]   [address] [place] [address] [phone]   Karyn Youngblood [phone]   120 [facility] 1525 [facility] [phone]   Supa Agustin [phone]   2208 [facility] [address] [facility]  2401 [facility] 1000 [address] [facility] [phone]

## 2020-07-28 NOTE — ED NOTES
Patient refusing Tylenol and requesting stronger pain medication at this time  MD made aware        Heather José, RN  07/27/20 0739

## 2020-07-28 NOTE — ED PROVIDER NOTES
History  Chief Complaint   Patient presents with    Shortness of Breath     pt reports wearing 3L O2 at home, reports increased SOB for the last hour and chest pain that started three hours ago, reports h/o COPD and CHF      22-year-old male history of COPD, CAD status post stenting, CHF, diabetes who presents for evaluation of shortness of breath  Patient states his symptoms began at rest approximately 3-4 hours prior to emergency department arrival   They have been persistent since  Associated with some chest tightness  Patient did not try any nebulizers prior to arrival  Patient is on 3 L nasal cannula at baseline  No increase in oxygen requirement  No weight gain or lower extremity edema  The chest discomfort is nonpleuritic in nature  Echocardiogram in 2018 demonstrated ejection fraction of 65%  Patient states was advised to start taking Lasix b i d  However deferred secondary to frequent urination  He denies any headache, fever, chills neck pain, neck stiffness, abdominal pain, nausea, vomiting, or diaphoresis  He states this feels like prior COPD exacerbations  No known sick contacts  No recent travel  No recent antibiotics or steroid use  Prior to Admission Medications   Prescriptions Last Dose Informant Patient Reported? Taking?    Insulin Syringe/Needle U-500 (BD Insulin Syringe U-500) 31G X 6MM 0 5 ML MISC   No No   Sig: Use 1 syringe three times a day for injecting insulin  Pt needs an appt   ONE TOUCH ULTRA TEST test strip   No Yes   Sig: TEST FOUR TIMES A DAY   Omega-3 Fatty Acids (FISH OIL) 645 MG CAPS 7/27/2020 at Unknown time Self Yes Yes   Sig: Take 1 capsule by mouth 2 (two) times a day   TRELEGY ELLIPTA 100-62 5-25 MCG/INH inhaler   No Yes   Sig: INHALE 1 PUFF EVERY DAY   aspirin (ECOTRIN LOW STRENGTH) 81 mg EC tablet 7/27/2020 at 0900 Self Yes Yes   Sig: Take 1 tablet by mouth daily   cyclobenzaprine (FLEXERIL) 10 mg tablet 7/27/2020 at 0900  No Yes   Sig: TAKE ONE TABLET BY MOUTH THREE TIMES A DAY   ferrous sulfate 325 (65 Fe) mg tablet Not Taking at Unknown time Self Yes No   Sig: Take 325 mg by mouth daily with breakfast   fluticasone (FLONASE) 50 mcg/act nasal spray  Self No No   Si spray into each nostril daily   furosemide (LASIX) 40 mg tablet 2020 at Unknown time  No Yes   Sig: Take 1 tablet (40 mg total) by mouth 2 (two) times a day   insulin regular (HumuLIN R U-500) 500 units/mL CONCENTRATED injection   No No   Sig: Inject 0 09 mL (45 Units total) under the skin 2 (two) times a day before breakfast and lunch   levalbuterol (XOPENEX) 1 25 mg/3 mL nebulizer solution  Self Yes No   Sig: USE 1 VIAL IN NEBULIZER THREE TIMES A DAY   metoprolol tartrate (LOPRESSOR) 25 mg tablet 2020 at Unknown time  No Yes   Sig: TAKE ONE TABLET BY MOUTH TWICE A DAY   oxyCODONE-acetaminophen (PERCOCET) 7 5-325 MG per tablet  Self Yes Yes   Sig: Take by mouth every 4 (four) hours   pantoprazole (PROTONIX) 40 mg tablet   No No   Sig: TAKE 1 TABLET BY MOUTH EVERY DAY   potassium chloride (K-DUR,KLOR-CON) 20 mEq tablet 2020 at Unknown time  No Yes   Sig: TAKE 1 TABLET BY MOUTH EVERY DAY   simvastatin (ZOCOR) 40 mg tablet 2020 at 2100  No Yes   Sig: TAKE 1 TABLET BY MOUTH EVERY DAY   tamsulosin (FLOMAX) 0 4 mg   No Yes   Sig: TAKE 1 CAPSULE BY MOUTH EVERY DAY      Facility-Administered Medications: None       Past Medical History:   Diagnosis Date    Abdominal pain     Cardiac disease     CHF (congestive heart failure) (HCC)     COPD (chronic obstructive pulmonary disease) (HCC)     Coronary artery disease     Diabetes mellitus (HCC)     GERD (gastroesophageal reflux disease)     Hyperlipidemia     Hypertension     [facility] )     with 3 stents    Prostate cancer ([facility] )        Past Surgical History:   Procedure Laterality Date    ABDOMINAL SURGERY      exploratory    ANGIOPLASTY      3 stents    APPENDECTOMY      COLONOSCOPY      ESOPHAGOGASTRODUODENOSCOPY N/A 10/2/2017    Procedure: ESOPHAGOGASTRODUODENOSCOPY (EGD); Surgeon: Ricardo Adams MD;  Location: [facility]; Service: Gastroenterology    KNEE CARTILAGE SURGERY      OTHER SURGICAL HISTORY      stent placement    PROSTATE SURGERY      SKIN GRAFT      Basal cell CA back       Family History   Problem Relation Age of Onset    Heart disease Father     Other Father         Mesothelioma      I have reviewed and agree with the history as documented  E-Cigarette/Vaping    E-Cigarette Use Never User      E-Cigarette/Vaping Substances    Nicotine No     THC No     CBD No     Flavoring No     Other No     Unknown No      Social History     Tobacco Use    Smoking status: Former Smoker     Packs/day: 1 50     Years: 50 00     Pack years: 75 00     Last attempt to quit: 2017     Years since quittin 8    Smokeless tobacco: Never Used   Substance Use Topics    Alcohol use: Not Currently     Frequency: Never     Binge frequency: Never    Drug use: No        Review of Systems   Constitutional: Negative for activity change, appetite change, chills, fatigue, fever and unexpected weight change  HENT: Negative for congestion, ear discharge, ear pain, nosebleeds, postnasal drip, rhinorrhea, sinus pressure, sinus pain, sore throat and tinnitus  Eyes: Negative for photophobia, pain, discharge, redness, itching and visual disturbance  Respiratory: Positive for cough, chest tightness and shortness of breath  Negative for choking and wheezing  Cardiovascular: Positive for chest pain  Negative for palpitations and leg swelling  Gastrointestinal: Negative for abdominal pain, blood in stool, constipation, diarrhea, nausea and vomiting  Genitourinary: Negative for dysuria, enuresis, flank pain and hematuria  Musculoskeletal: Negative for myalgias, neck pain and neck stiffness  Skin: Negative for color change, pallor, rash and wound     Neurological: Negative for dizziness, seizures, syncope, light-headedness, numbness and headaches  Hematological: Negative  Psychiatric/Behavioral: Negative  Physical Exam  ED Triage Vitals   Temperature Pulse Respirations Blood Pressure SpO2   07/27/20 2020 07/27/20 2016 07/27/20 2016 07/27/20 2019 07/27/20 2016   98 2 °F (36 8 °C) (!) 131 (!) 26 158/88 92 %      Temp Source Heart Rate Source Patient Position - Orthostatic VS BP Location FiO2 (%)   07/27/20 2020 07/27/20 2016 -- -- --   Oral Monitor         Pain Score       07/27/20 2016       6             Orthostatic Vital Signs  Vitals:    07/28/20 0404 07/28/20 0700 07/28/20 1500 07/28/20 2300   BP: 152/72 154/74 154/87 149/88   Pulse: 86 99 96 98       Physical Exam   Constitutional: He is oriented to person, place, and time  He appears well-developed and well-nourished  No distress  HENT:   Head: Normocephalic and atraumatic  Nose: Nose normal    Mouth/Throat: Oropharynx is clear and moist    Eyes: Pupils are equal, round, and reactive to light  Conjunctivae and EOM are normal    Neck: Normal range of motion  Neck supple  No JVD present  No tracheal deviation present  Cardiovascular: Regular rhythm, normal heart sounds and intact distal pulses  No murmur heard  Tachycardic   Pulmonary/Chest: Effort normal and breath sounds normal  No stridor  Tachypnea noted  No respiratory distress  He has no wheezes  He has no rales  He exhibits no tenderness  Poor air entry   Abdominal: Soft  Bowel sounds are normal  He exhibits no distension  There is no tenderness  There is no rebound and no guarding  Musculoskeletal: Normal range of motion  He exhibits no edema, tenderness or deformity  Neurological: He is alert and oriented to person, place, and time  He displays normal reflexes  No cranial nerve deficit or sensory deficit  He exhibits normal muscle tone  Coordination normal    Skin: Skin is warm and dry  Capillary refill takes less than 2 seconds  No rash noted   He is not diaphoretic  No erythema  No pallor  Psychiatric: He has a normal mood and affect  His behavior is normal  His mood appears not anxious  Nursing note and vitals reviewed        ED Medications  Medications   aspirin (ECOTRIN LOW STRENGTH) EC tablet 81 mg (81 mg Oral Given 7/28/20 0815)   tamsulosin (FLOMAX) capsule 0 4 mg (0 4 mg Oral Given 7/28/20 0815)   pravastatin (PRAVACHOL) tablet 80 mg (80 mg Oral Given 7/28/20 1628)   pantoprazole (PROTONIX) EC tablet 40 mg (40 mg Oral Given 7/28/20 0815)   metoprolol tartrate (LOPRESSOR) tablet 25 mg (25 mg Oral Given 7/28/20 1729)   ferrous sulfate tablet 325 mg (325 mg Oral Given 7/28/20 0815)   fluticasone (FLONASE) 50 mcg/act nasal spray 1 spray (1 spray Nasal Refused 7/28/20 0816)   insulin regular (HumuLIN R U-500) 500 units/mL CONCENTRATED injection 45 Units (45 Units Subcutaneous Given 7/28/20 1232)   heparin (porcine) subcutaneous injection 5,000 Units (5,000 Units Subcutaneous Given 7/28/20 2222)   insulin lispro (HumaLOG) 100 units/mL subcutaneous injection 2-12 Units (2 Units Subcutaneous Given 7/28/20 1625)   insulin lispro (HumaLOG) 100 units/mL subcutaneous injection 1-6 Units (4 Units Subcutaneous Given 7/28/20 2223)   acetaminophen (TYLENOL) tablet 975 mg (975 mg Oral Not Given 7/28/20 2222)   lidocaine (LIDODERM) 5 % patch 1 patch (1 patch Topical Not Given 7/28/20 0812)   levalbuterol (XOPENEX) inhalation solution 1 25 mg (1 25 mg Nebulization Given 7/28/20 1953)   ipratropium (ATROVENT) 0 02 % inhalation solution 0 5 mg (0 5 mg Nebulization Given 7/28/20 1953)   budesonide (PULMICORT) inhalation solution 0 5 mg (0 5 mg Nebulization Given 7/28/20 1953)   guaiFENesin (MUCINEX) 12 hr tablet 600 mg (600 mg Oral Given 7/28/20 2153)   furosemide (LASIX) tablet 40 mg (40 mg Oral Given 7/28/20 1729)   cyclobenzaprine (FLEXERIL) tablet 10 mg (10 mg Oral Given 7/28/20 2229)   albuterol inhalation solution 10 mg (10 mg Nebulization Given 7/27/20 2051)     And ipratropium (ATROVENT) 0 02 % inhalation solution 1 mg (1 mg Nebulization Given 7/27/20 2051)     And   sodium chloride 0 9 % inhalation solution 3 mL (3 mL Nebulization Given 7/27/20 2051)   methylPREDNISolone sodium succinate (Solu-MEDROL) injection 125 mg (125 mg Intravenous Given 7/27/20 2043)   multi-electrolyte (ISOLYTE-S PH 7 4) bolus 500 mL (0 mL Intravenous Stopped 7/28/20 0018)   iohexol (OMNIPAQUE) 350 MG/ML injection (MULTI-DOSE) 85 mL (85 mL Intravenous Given 7/27/20 2231)       Diagnostic Studies  Results Reviewed     Procedure Component Value Units Date/Time    NT-BNP PRO [[id number]]  (Abnormal) Collected:  07/27/20 2043    Lab Status:  Final result Specimen:  Blood from Arm, Right Updated:  07/28/20 0828     NT-proBNP 302 pg/mL     Novel Coronavirus (Covid-19),PCR [facility] [[id number]]  (Normal) Collected:  07/28/20 0133    Lab Status:  Final result Specimen:  Nares from Nasopharyngeal Swab Updated:  07/28/20 0257     SARS-CoV-2 Negative    Narrative: The specimen collection materials, transport medium, and/or testing methodology utilized in the production of these test results have been proven to be reliable in a limited validation with an abbreviated program under the Emergency Utilization Authorization provided by the FDA  Testing reported as "Presumptive positive" will be confirmed with secondary testing with a reference laboratory to ensure result accuracy  Clinical caution and judgement should be used with the interpretation of these results with consideration of the clinical impression and other laboratory testing  Testing reported as "Positive" or "Negative" has been proven to be accurate according to standard laboratory validation requirements  All testing is performed with control materials showing appropriate reactivity at standard intervals        Troponin I [[id number]]  (Normal) Collected:  07/27/20 2043    Lab Status:  Final result Specimen:  Blood from Arm, Right Updated: 07/27/20 2120     Troponin I <0 02 ng/mL     Comprehensive metabolic panel [[id number]]  (Abnormal) Collected:  07/27/20 2043    Lab Status:  Final result Specimen:  Blood from Arm, Right Updated:  07/27/20 2120     Sodium 138 mmol/L      Potassium 4 6 mmol/L      Chloride 96 mmol/L      CO2 40 mmol/L      ANION GAP 2 mmol/L      BUN 28 mg/dL      Creatinine 1 51 mg/dL      Glucose 161 mg/dL      Calcium 10 1 mg/dL      AST 34 U/L      ALT 28 U/L      Alkaline Phosphatase 102 U/L      Total Protein 9 2 g/dL      Albumin 3 7 g/dL      Total Bilirubin 0 24 mg/dL      eGFR 45 ml/min/1 73sq m     Narrative:       [place] guidelines for Chronic Kidney Disease (CKD):     Stage 1 with normal or high GFR (GFR > 90 mL/min/1 73 square meters)    Stage 2 Mild CKD (GFR = 60-89 mL/min/1 73 square meters)    Stage 3A Moderate CKD (GFR = 45-59 mL/min/1 73 square meters)    Stage 3B Moderate CKD (GFR = 30-44 mL/min/1 73 square meters)    Stage 4 Severe CKD (GFR = 15-29 mL/min/1 73 square meters)    Stage 5 End Stage CKD (GFR <15 mL/min/1 73 square meters)  Note: GFR calculation is accurate only with a steady state creatinine    CBC and differential [[id number]]  (Abnormal) Collected:  07/27/20 2043    Lab Status:  Final result Specimen:  Blood from Arm, Right Updated:  07/27/20 2055     WBC 16 28 Thousand/uL      RBC 5 34 Million/uL      Hemoglobin 13 5 g/dL      Hematocrit 45 1 %      MCV 85 fL      MCH 25 3 pg      MCHC 29 9 g/dL      RDW 14 4 %      MPV 10 7 fL      Platelets 411 Thousands/uL      nRBC 0 /100 WBCs      Neutrophils Relative 89 %      Immat GRANS % 1 %      Lymphocytes Relative 6 %      Monocytes Relative 4 %      Eosinophils Relative 0 %      Basophils Relative 0 %      Neutrophils Absolute 14 41 Thousands/µL      Immature Grans Absolute 0 13 Thousand/uL      Lymphocytes Absolute 0 97 Thousands/µL      Monocytes Absolute 0 71 Thousand/µL      Eosinophils Absolute 0 03 Thousand/µL Basophils Absolute 0 03 Thousands/µL                  XR chest portable   Final Result by Óscar Lord MD (07/28 9040)      No pneumothorax  Stable right pleural effusion with overlying atelectasis  Workstation performed: [id number]         CTA ED chest PE Study   Final Result by Piter Peoples DO (07/27 5576)      No evidence of pulmonary embolus      Large pulmonary nodule in the right lung apex  Based on current Fleischner Society 2017 Guidelines on incidental pulmonary nodule, either PET/CT scan evaluation, tissue sampling or short term interval followup non-contrast CT followup (initailly in 3    months) may be considered appropriate  The study was marked in [facility] for immediate notification  Workstation performed: [id number]         XR chest 1 view portable   Final Result by Moris Walsh MD (07/28 6800)      Right upper lobe nodule correlating with the CT findings  Moderate right pleural effusion              Workstation performed: [id number]               Procedures  ECG 12 Lead Documentation Only  Date/Time: 7/27/2020 8:56 PM  Performed by: Leslie Ramirez MD  Authorized by: Leslie Ramirez MD     Indications / Diagnosis:  Sob  ECG reviewed by me, the ED Provider: yes    Patient location:  ED  Previous ECG:     Previous ECG:  Compared to current    Similarity:  Changes noted  Interpretation:     Interpretation: abnormal    Rate:     ECG rate:  130    ECG rate assessment: tachycardic    Rhythm:     Rhythm: sinus rhythm    Ectopy:     Ectopy: none    QRS:     QRS axis:  Right  Conduction:     Conduction: abnormal      Abnormal conduction: complete RBBB    ST segments:     ST segments:  Non-specific  T waves:     T waves: normal            ED Course  ED Course as of Jul 29 0042 Mon Jul 27, 2020 2049 Blood Pressure: 158/88   2049 Temperature: 98 2 °F (36 8 °C)   2049 Pulse(!): 131   2049 Respirations(!): 26   2049 SpO2: 92 %   2055 WBC(!): 16 28 2132 Troponin I: <0 02   2132 Troponin I: <0 02   2132 Creatinine(!): 1 51   2202 Patient re-evaluated still with decreased air movement       2208 Right pleural effusion      Tue Jul 28, 2020 0205 Admitted to [facility] for COPD exacerbation, found to have right pleural effusion and right lung nodule      0205 On his baseline 3 L nasal cannula          US AUDIT      Most Recent Value   Initial Alcohol Screen: US AUDIT-C    1  How often do you have a drink containing alcohol?  0 Filed at: 07/27/2020 2017   Audit-C Score  0 Filed at: 07/27/2020 2017            HEART Risk Score      Most Recent Value   Heart Score Risk Calculator   History  1 Filed at: 07/27/2020 2133   ECG  1 Filed at: 07/27/2020 2133   Age  2 Filed at: 07/27/2020 2133   Risk Factors  2 Filed at: 07/27/2020 2133   Troponin  0 Filed at: 07/27/2020 2133   HEART Score  6 Filed at: 07/27/2020 2133        Identification of Seniors at Risk      Most Recent Value   (ISAR) Identification of Seniors at Risk   Before the illness or injury that brought you to the Emergency, did you need someone to help you on a regular basis? 1 Filed at: 07/27/2020 2018   In the last 24 hours, have you needed more help than usual?  0 Filed at: 07/27/2020 2018   Have you been hospitalized for one or more nights during the past 6 months? 1 Filed at: 07/27/2020 2018   In general, do you see well?  0 Filed at: 07/27/2020 2018   In general, do you have serious problems with your memory? 0 Filed at: 07/27/2020 2018   Do you take more than three different medications every day? 1 Filed at: 07/27/2020 2018   ISAR Score  3 Filed at: 07/27/2020 2018          BERTHA/DAST-10      Most Recent Value   How many times in the past year have you    Used an illegal drug or used a prescription medication for non-medical reasons?   Never Filed at: 07/27/2020 2017                    Wells' Criteria for PE      Most Recent Value   Wells' Criteria for PE   Clinical signs and symptoms of DVT  0 Filed at: 07/27/2020 2211   PE is primary diagnosis or equally likely  0 Filed at: 07/27/2020 2211   HR >100  1 5 Filed at: 07/27/2020 2211   Immobilization at least 3 days or Surgery in the previous 4 weeks  0 Filed at: 07/27/2020 2211   Previous, objectively diagnosed PE or DVT  0 Filed at: 07/27/2020 2211   Hemoptysis  0 Filed at: 07/27/2020 2211   Malignancy with treatment within 6 months or palliative  0 Filed at: 07/27/2020 2211   Wells' Criteria Total  1 5 Filed at: 07/27/2020 2211                MDM  Number of Diagnoses or Management Options  MARANDA (acute kidney injury) ([facility] [address] ):   COPD exacerbation ([facility] ):   Dyspnea:   Pleural effusion:   Pulmonary nodule:   Diagnosis management comments: 82-year-old male with significant cardiopulmonary risk factors presents for evaluation of shortness of breath which began at rest   On initial evaluation, patient tachypneic and tachycardic but in no acute distress  He had poor air movement upon auscultation  He was on his 3 L nasal cannula which is baseline  Differential diagnosis includes but is not limited to COPD exacerbation, CHF exacerbation, ACS, pneumonia, or arrhythmia  Will check cardiac workup and give heart neb  Labs demonstrated leukocytosis and MARANDA, but were otherwise unremarkable  Although patient was initially tachycardic and tachypneic and was later found to have leukocytosis, it is not believed that patient's symptoms are secondary to infectious etiology  There likely secondary to COPD exacerbation  Will not start antibiotics at this time  For his MARANDA, will gently rehydrate  Chest x-ray demonstrated right pleural effusion  Given patient's persistent shortness of breath, will get CT to further evaluate for possible infectious versus malignant pathology  CT scan demonstrated moderate right-sided effusion with compressive atelectasis as well as apical lung nodule  Patient will be admitted to [facility] for further management and evaluation          Disposition  Final diagnoses:   Dyspnea COPD exacerbation (HCC)   Pleural effusion   Pulmonary nodule   [facility]     Time reflects when diagnosis was documented in both MDM as applicable and the Disposition within this note     Time User Action Codes Description Comment    7/27/2020 11:25 PM Check, Pardeep Edward Add [R06 00] Dyspnea     7/27/2020 11:25 PM Check, Pardeep Edward Add [J44 1] COPD exacerbation ([facility] )     7/27/2020 11:25 PM Check, Pardeep Edward Modify [R06 00] Dyspnea     7/27/2020 11:25 PM Check, Pardeep Edward Modify [J44 1] COPD exacerbation ([facility] )     7/27/2020 11:25 PM Check, Pardeep Edward Add [J90] Pleural effusion     7/27/2020 11:25 PM Check, Pardeep Edward Add [R91 1] Pulmonary nodule     7/28/2020 12:11 AM Check, Pardeep Edward Add [N17 9] MARANDA (acute kidney injury) ([facility] )     7/28/2020  2:25 PM Lonzie Laser Add [J90] Pleural effusion on right       ED Disposition     ED Disposition Condition Date/Time Comment    Admit Stable Tue Jul 28, 2020 12:28 AM Case was discussed with VIKTORIA and the patient's admission status was agreed to be Admission Status: inpatient status to the service of Dr Benna Lefort  Follow-up Information     Follow up With Specialties Details Why [address] [place] [address]  Follow up  [address]  [phone]          Current Discharge Medication List      CONTINUE these medications which have CHANGED    Details   insulin regular (HumuLIN R U-500) 500 units/mL CONCENTRATED injection Inject 0 09 mL (45 Units total) under the skin 2 (two) times a day before breakfast and lunch  Qty: 30 mL, Refills: 3    Associated Diagnoses: Chronic respiratory failure with hypoxia and hypercapnia ([place] [address] );  Type 2 diabetes mellitus with complication, with long-term current use of insulin ([facility] )         CONTINUE these medications which have NOT CHANGED    Details   aspirin (ECOTRIN LOW STRENGTH) 81 mg EC tablet Take 1 tablet by mouth daily      cyclobenzaprine (FLEXERIL) 10 mg tablet TAKE ONE TABLET BY MOUTH THREE TIMES A DAY  Qty: 60 tablet, Refills: 2    Associated Diagnoses: Chronic bilateral low back pain, unspecified whether sciatica present      furosemide (LASIX) 40 mg tablet Take 1 tablet (40 mg total) by mouth 2 (two) times a day  Qty: 60 tablet, Refills: 4    Associated Diagnoses: Acute on chronic diastolic (congestive) heart failure (HCC)      metoprolol tartrate (LOPRESSOR) 25 mg tablet TAKE ONE TABLET BY MOUTH TWICE A DAY  Qty: 60 tablet, Refills: 5    Associated Diagnoses: Coronary artery disease involving native coronary artery of native heart without angina pectoris      Omega-3 Fatty Acids (FISH OIL) 645 MG CAPS Take 1 capsule by mouth 2 (two) times a day      ONE TOUCH ULTRA TEST test strip TEST FOUR TIMES A DAY  Qty: 150 each, Refills: 5    Associated Diagnoses: Diabetes mellitus without complication ([facility])      oxyCODONE-acetaminophen (PERCOCET) 7 5-325 MG per tablet Take by mouth every 4 (four) hours      potassium chloride (K-DUR,KLOR-CON) 20 mEq tablet TAKE 1 TABLET BY MOUTH EVERY DAY  Qty: 60 tablet, Refills: 2    Associated Diagnoses: Chronic respiratory failure with hypoxia and hypercapnia ([facility])      simvastatin (ZOCOR) 40 mg tablet TAKE 1 TABLET BY MOUTH EVERY DAY  Qty: 60 tablet, Refills: 2    Associated Diagnoses: Hypercholesterolemia      tamsulosin (FLOMAX) 0 4 mg TAKE 1 CAPSULE BY MOUTH EVERY DAY  Qty: 60 capsule, Refills: 2    Associated Diagnoses: Urinary frequency      TRELEGY ELLIPTA 100-62 5-25 MCG/INH inhaler INHALE 1 PUFF EVERY DAY  Qty: 60 each, Refills: 3    Associated Diagnoses: Chronic obstructive pulmonary disease, unspecified COPD type ([facility])      ferrous sulfate 325 (65 Fe) mg tablet Take 325 mg by mouth daily with breakfast      fluticasone (FLONASE) 50 mcg/act nasal spray 1 spray into each nostril daily  Qty: 16 g, Refills: 0    Associated Diagnoses: Chronic respiratory failure with hypoxia and hypercapnia ([facility])      Insulin Syringe/Needle U-500 (BD Insulin Syringe U-500) 31G X 6MM 0 5 ML MISC Use 1 syringe three times a day for injecting insulin  Pt needs an appt  Qty: 100 each, Refills: 0    Associated Diagnoses: Type 2 diabetes mellitus with hyperglycemia, with long-term current use of insulin (HCC)      levalbuterol (XOPENEX) 1 25 mg/3 mL nebulizer solution USE 1 VIAL IN NEBULIZER THREE TIMES A DAY  Refills: 5      pantoprazole (PROTONIX) 40 mg tablet TAKE 1 TABLET BY MOUTH EVERY DAY  Qty: 60 tablet, Refills: 2    Associated Diagnoses: Gastroesophageal reflux disease, esophagitis presence not specified           No discharge procedures on file  PDMP Review       Value Time User    PDMP Reviewed  Yes 7/28/2020  1:30 AM Heather London DO           ED Provider  Attending physically available and evaluated Louie Jensen Sr    I managed the patient along with the ED Attending      Electronically Signed by         Ernst Sharp MD  07/29/20 2753

## 2020-07-28 NOTE — ASSESSMENT & PLAN NOTE
· Patient noted with 1 3 cm right lung apex pulmonary nodule  A small nodule was noted on CT chest 06/2018  ·  pulmonary consult

## 2020-07-28 NOTE — ASSESSMENT & PLAN NOTE
· Patient presented with worsening shortness of breath shortly prior to arrival  · CTA PE study negative for pulmonary embolism however with moderate size right pleural effusion and new lung nodule noted  · Additionally COVID-19 nasal swab negative  · Consideration for possible COPD exacerbation and/or CHF exacerbation    Additionally pleural effusion may be playing role  · Currently on home 3L NC  · See plans below for further workup

## 2020-07-28 NOTE — PROCEDURES
Thoracentesis  Date/Time: 7/28/2020 2:21 PM  Performed by: Genaro Benavides DO  Authorized by: Genaro Benavides DO     Patient location:  Bedside  Other Assisting Provider: Yes (comment) (Dr Cynthia Grover )    Consent:     Consent obtained:  Verbal and written    Consent given by:  Patient    Risks discussed:  Bleeding, infection, pain, pneumothorax, nerve damage and incomplete drainage    Alternatives discussed:  Observation, delayed treatment, no treatment and alternative treatment  Universal protocol:     Procedure explained and questions answered to patient or proxy's satisfaction: yes      Relevant documents present and verified: yes      Test results available and properly labeled: yes      Radiology Images displayed and confirmed  If images not available, report reviewed: yes      Required blood products, implants, devices and special equipment available: yes      Site/side marked: yes      Immediately prior to procedure a time out was called: yes      Patient identity confirmed:  Verbally with patient, arm band and hospital-assigned identification number  Indications:     Procedure Purpose: diagnostic and therapeutic      Indications: pleural effusion    Anesthesia (see MAR for exact dosages): Anesthesia method:  Local infiltration    Local anesthetic:  Lidocaine 1% w/o epi  Procedure details:     Preparation: Patient was prepped and draped in usual sterile fashion      Standard thoracentesis cath kit used: Yes      Patient position:  Sitting    Laterality:  Right    Location:  Midscapular line    Intercostal space:  4th    Puncture method:  Over-the-needle catheter    Ultrasound guidance: yes      Reason for ultrasound: Identify fluid collection and guide cathetar placement        Indwelling catheter placed: no      Number of attempts:  1    Needle gauge:  16    Catheter size:  8 Fr    Drainage color:  Sangeetha    Drainage characteristics:  Serosanguinous    Fluid removed amount:  1000cc  Post-procedure details: Chest x-ray performed: yes      Chest x-ray findings:  Pleural effusion improved    Patient tolerance of procedure:   Tolerated well, no immediate complications

## 2020-07-28 NOTE — ASSESSMENT & PLAN NOTE
· Moderate right-sided pleural effusion is noted, consideration for CHF as cause however with changed in size lung nodule noted as well  · Appreciate pulmonology evaluation

## 2020-07-29 LAB
GLUCOSE SERPL-MCNC: 195 MG/DL (ref 65–140)
GLUCOSE SERPL-MCNC: 209 MG/DL (ref 65–140)
GLUCOSE SERPL-MCNC: 301 MG/DL (ref 65–140)
GLUCOSE SERPL-MCNC: 352 MG/DL (ref 65–140)

## 2020-07-29 PROCEDURE — 94760 N-INVAS EAR/PLS OXIMETRY 1: CPT

## 2020-07-29 PROCEDURE — 82948 REAGENT STRIP/BLOOD GLUCOSE: CPT

## 2020-07-29 PROCEDURE — 99222 1ST HOSP IP/OBS MODERATE 55: CPT | Performed by: INTERNAL MEDICINE

## 2020-07-29 PROCEDURE — 94640 AIRWAY INHALATION TREATMENT: CPT

## 2020-07-29 PROCEDURE — 99232 SBSQ HOSP IP/OBS MODERATE 35: CPT | Performed by: INTERNAL MEDICINE

## 2020-07-29 PROCEDURE — 99233 SBSQ HOSP IP/OBS HIGH 50: CPT | Performed by: INTERNAL MEDICINE

## 2020-07-29 RX ORDER — FUROSEMIDE 10 MG/ML
40 INJECTION INTRAMUSCULAR; INTRAVENOUS
Status: COMPLETED | OUTPATIENT
Start: 2020-07-29 | End: 2020-07-30

## 2020-07-29 RX ORDER — OXYCODONE HYDROCHLORIDE 5 MG/1
5 TABLET ORAL EVERY 4 HOURS PRN
Status: DISCONTINUED | OUTPATIENT
Start: 2020-07-29 | End: 2020-07-31 | Stop reason: HOSPADM

## 2020-07-29 RX ADMIN — IPRATROPIUM BROMIDE 0.5 MG: 0.5 SOLUTION RESPIRATORY (INHALATION) at 13:24

## 2020-07-29 RX ADMIN — LEVALBUTEROL HYDROCHLORIDE 1.25 MG: 1.25 SOLUTION, CONCENTRATE RESPIRATORY (INHALATION) at 07:24

## 2020-07-29 RX ADMIN — FLUTICASONE PROPIONATE 1 SPRAY: 50 SPRAY, METERED NASAL at 08:55

## 2020-07-29 RX ADMIN — BUDESONIDE 0.5 MG: 0.5 INHALANT RESPIRATORY (INHALATION) at 20:09

## 2020-07-29 RX ADMIN — METOPROLOL TARTRATE 25 MG: 25 TABLET, FILM COATED ORAL at 08:53

## 2020-07-29 RX ADMIN — FUROSEMIDE 40 MG: 10 INJECTION, SOLUTION INTRAMUSCULAR; INTRAVENOUS at 18:13

## 2020-07-29 RX ADMIN — LIDOCAINE 1 PATCH: 50 PATCH TOPICAL at 08:55

## 2020-07-29 RX ADMIN — CYCLOBENZAPRINE HYDROCHLORIDE 10 MG: 10 TABLET, FILM COATED ORAL at 06:01

## 2020-07-29 RX ADMIN — PRAVASTATIN SODIUM 80 MG: 80 TABLET ORAL at 18:14

## 2020-07-29 RX ADMIN — HEPARIN SODIUM 5000 UNITS: 5000 INJECTION INTRAVENOUS; SUBCUTANEOUS at 06:01

## 2020-07-29 RX ADMIN — FUROSEMIDE 40 MG: 40 TABLET ORAL at 08:53

## 2020-07-29 RX ADMIN — LEVALBUTEROL HYDROCHLORIDE 1.25 MG: 1.25 SOLUTION, CONCENTRATE RESPIRATORY (INHALATION) at 20:09

## 2020-07-29 RX ADMIN — ACETAMINOPHEN 975 MG: 325 TABLET, FILM COATED ORAL at 21:59

## 2020-07-29 RX ADMIN — INSULIN LISPRO 2 UNITS: 100 INJECTION, SOLUTION INTRAVENOUS; SUBCUTANEOUS at 18:11

## 2020-07-29 RX ADMIN — IPRATROPIUM BROMIDE 0.5 MG: 0.5 SOLUTION RESPIRATORY (INHALATION) at 07:24

## 2020-07-29 RX ADMIN — TAMSULOSIN HYDROCHLORIDE 0.4 MG: 0.4 CAPSULE ORAL at 08:53

## 2020-07-29 RX ADMIN — PANTOPRAZOLE SODIUM 40 MG: 40 TABLET, DELAYED RELEASE ORAL at 08:54

## 2020-07-29 RX ADMIN — HEPARIN SODIUM 5000 UNITS: 5000 INJECTION INTRAVENOUS; SUBCUTANEOUS at 22:00

## 2020-07-29 RX ADMIN — INSULIN HUMAN 45 UNITS: 500 INJECTION, SOLUTION SUBCUTANEOUS at 13:40

## 2020-07-29 RX ADMIN — BUDESONIDE 0.5 MG: 0.5 INHALANT RESPIRATORY (INHALATION) at 07:25

## 2020-07-29 RX ADMIN — METOPROLOL TARTRATE 25 MG: 25 TABLET, FILM COATED ORAL at 18:14

## 2020-07-29 RX ADMIN — INSULIN LISPRO 8 UNITS: 100 INJECTION, SOLUTION INTRAVENOUS; SUBCUTANEOUS at 08:49

## 2020-07-29 RX ADMIN — GUAIFENESIN 600 MG: 600 TABLET, EXTENDED RELEASE ORAL at 22:00

## 2020-07-29 RX ADMIN — ASPIRIN 81 MG: 81 TABLET, COATED ORAL at 08:53

## 2020-07-29 RX ADMIN — INSULIN HUMAN 45 UNITS: 500 INJECTION, SOLUTION SUBCUTANEOUS at 08:49

## 2020-07-29 RX ADMIN — IPRATROPIUM BROMIDE 0.5 MG: 0.5 SOLUTION RESPIRATORY (INHALATION) at 20:09

## 2020-07-29 RX ADMIN — GUAIFENESIN 600 MG: 600 TABLET, EXTENDED RELEASE ORAL at 08:54

## 2020-07-29 RX ADMIN — INSULIN LISPRO 10 UNITS: 100 INJECTION, SOLUTION INTRAVENOUS; SUBCUTANEOUS at 13:39

## 2020-07-29 RX ADMIN — HEPARIN SODIUM 5000 UNITS: 5000 INJECTION INTRAVENOUS; SUBCUTANEOUS at 13:41

## 2020-07-29 RX ADMIN — INSULIN LISPRO 2 UNITS: 100 INJECTION, SOLUTION INTRAVENOUS; SUBCUTANEOUS at 22:02

## 2020-07-29 RX ADMIN — OXYCODONE HYDROCHLORIDE 5 MG: 5 TABLET ORAL at 18:14

## 2020-07-29 RX ADMIN — LEVALBUTEROL HYDROCHLORIDE 1.25 MG: 1.25 SOLUTION, CONCENTRATE RESPIRATORY (INHALATION) at 13:24

## 2020-07-29 RX ADMIN — OXYCODONE HYDROCHLORIDE 5 MG: 5 TABLET ORAL at 12:14

## 2020-07-29 RX ADMIN — FERROUS SULFATE TAB 325 MG (65 MG ELEMENTAL FE) 325 MG: 325 (65 FE) TAB at 08:54

## 2020-07-29 NOTE — PROGRESS NOTES
Progress Note - Pulmonary   Gilmer Roll Sr  69 y o  male MRN: [id number]  Unit/Bed#: [facility] 928-01 Encounter: [id number]      Impressions:    1  RT recurrent pleural effusion- s/p thoracentesis yesterday for 1L  So far transudative  2  New right apical pulmonary nodule- 1 3cm  Cancer probability Will Monday prediction is 24 3% and therefore warrants a PET/CT at least  3  Chronic hypoxic resp failure- 3L NC at baseline  4  Severe pulm HTN- Group III due to severe COPD  5  Very severe COPD not inexacerbation  On trelegy  6  Suspect JACQUELINE/OHS  7  Hx of nicotine dependence    Plan:    · Continue supp O2 as needed  · Continue atrovent/xopenex and pulmicort  · F/up pleural fluid cytology  · Needs a PET/CT as outpatient and likely IR biopsy  · Home regimen is trelegy  · Lasix 40mg BID  · Continue mucinex    Subjective:   Patient seen and examined bedside  Feels the same as yesterday  No acute events overnight    Review of Systems   Constitutional: Negative for chills and fever  HENT: Negative for congestion, postnasal drip and rhinorrhea  Eyes: Negative for itching  Respiratory: Positive for shortness of breath  Negative for cough, wheezing and stridor  Cardiovascular: Negative for chest pain, palpitations and leg swelling  Gastrointestinal: Negative for abdominal distention, abdominal pain, nausea and vomiting  Genitourinary: Negative for dysuria and flank pain  Musculoskeletal: Negative for arthralgias and myalgias  Skin: Negative for color change  Neurological: Negative for dizziness, light-headedness and headaches  Psychiatric/Behavioral: Negative          Objective:     Vitals:    07/28/20 2300 07/29/20 0600 07/29/20 0645 07/29/20 0727   BP: 149/88  143/69    Pulse: 98  92    Resp: 17  18    Temp: 98 °F (36 7 °C)  98 1 °F (36 7 °C)    TempSrc:       SpO2: 97%  96% 95%   Weight:  120 kg (265 lb 6 9 oz)     Height:               Intake/Output Summary (Last 24 hours) at 7/29/2020 1116  Last data filed at 7/29/2020 0908  Gross per 24 hour   Intake 980 ml   Output    Net 980 ml         Physical Exam   Constitutional: He is oriented to person, place, and time  No distress  HENT:   Head: Normocephalic and atraumatic  Nose: Nose normal    Mouth/Throat: Oropharynx is clear and moist  No oropharyngeal exudate  Eyes: Pupils are equal, round, and reactive to light  Conjunctivae and EOM are normal  No scleral icterus  Neck: Normal range of motion  Neck supple  No JVD present  No tracheal deviation present  No thyromegaly present  Cardiovascular: Normal rate, regular rhythm, normal heart sounds and intact distal pulses  Exam reveals no gallop and no friction rub  No murmur heard  Pulmonary/Chest: Effort normal  No stridor  No respiratory distress  He has no wheezes  He has no rales  Decreased right basilar breath sounds   Abdominal: Soft  Bowel sounds are normal  He exhibits no distension  There is no tenderness  There is no rebound and no guarding  Musculoskeletal: Normal range of motion  He exhibits no edema or deformity  Lymphadenopathy:     He has no cervical adenopathy  Neurological: He is alert and oriented to person, place, and time  No cranial nerve deficit or sensory deficit  Skin: Skin is warm  No rash noted  He is not diaphoretic  No erythema  Psychiatric: He has a normal mood and affect  Labs: I have personally reviewed pertinent lab results    Results from last 7 days   Lab Units 07/28/20  0543 07/27/20  2043   WBC Thousand/uL 10 75* 16 28*   HEMOGLOBIN g/dL 11 8* 13 5   HEMATOCRIT % 39 7 45 1   PLATELETS Thousands/uL 180 212   NEUTROS PCT %  --  89*   MONOS PCT %  --  4      Results from last 7 days   Lab Units 07/28/20  0543 07/27/20  2043   POTASSIUM mmol/L 5 3 4 6   CHLORIDE mmol/L 94* 96*   CO2 mmol/L 37* 40*   BUN mg/dL 27* 28*   CREATININE mg/dL 1 41* 1 51*   CALCIUM mg/dL 8 5 10 1   ALK PHOS U/L  --  102   ALT U/L  --  28   AST U/L  --  34                      0   Lab Value Date/Time    TROPONINI <0 02 07/27/2020 2043    TROPONINI <0 02 06/14/2019 2119    TROPONINI <0 02 06/14/2019 1804    TROPONINI <0 02 06/14/2019 1325    TROPONINI <0 02 06/19/2018 0750    TROPONINI <0 02 06/19/2018 0445    TROPONINI 0 02 05/24/2018 0500    TROPONINI 0 03 05/24/2018 0045    TROPONINI 0 06 (H) 05/23/2018 1252    TROPONINI <0 02 04/27/2018 1522    TROPONINI 0 20 (H) 02/02/2018 0239    TROPONINI 0 25 (H) 02/01/2018 2332    TROPONINI 0 27 (H) 02/01/2018 2043    TROPONINI 0 12 (H) 02/01/2018 1400    TROPONINI <0 02 11/01/2017 0440    TROPONINI <0 02 11/01/2017 0108    TROPONINI 0 10 (H) 09/28/2017 2005    TROPONINI 0 10 (H) 09/28/2017 1730    TROPONINI <0 02 11/13/2016 0014    TROPONINI <0 02 11/12/2016 2018    TROPONINI <0 02 01/21/2016 0543    TROPONINI <0 02 01/21/2016 0129    TROPONINI <0 02 01/20/2016 1217    TROPONINI <0 02 01/20/2016 0347    TROPONINI <0 02 01/20/2016 0141    TROPONINI <0 02 09/09/2015 0530    TROPONINI <0 02 09/09/2015 0041    TROPONINI 0 04 03/23/2015 1443    TROPONINI <0 04 03/23/2015 0629    TROPONINI <0 04 03/20/2014 0614    TROPONINI <0 04 03/20/2014 0149       Microbiology:  COVID negative    Imaging and other studies: I have personally reviewed pertinent reports     and I have personally reviewed pertinent films in PACS  CXR- RUL nodule, RLL persistent effusion      Jeff Shoemaker MD  Pulmonary & Critical Care Fellow, [facility]

## 2020-07-29 NOTE — CONSULTS
Consultation - Cardiology Team One  Mehrdad Pardo  69 y o  male MRN: [id number]  Unit/Bed#: [facility] 928-01 Encounter: [id number]    Inpatient consult to Cardiology  Consult performed by: YOLANDE Mazariegos  Consult ordered by: Dyllan Perez DO        Physician Requesting Consult: Dyllan Perez DO     Reason for Consult / Principal Problem: CHF/pleural effusion      Assessment/ Plan:    1  Pleural effusion: Right sided s/p thoracentesis 7/28 with 1L transudative fluid  Thought to be 2/2 to acute CHF  NT-ProBNP 300  Agree with IV lasix 40mg BID; was taking lasix 40mg PO daily as OP   - Strict I/Os   - daily weights   - low Na diet  Check repeat echo; last done 2018  Follow up on final cytology of pleural fluid  2  Chronic diastolic CHF: prescribed lasix 40mg PO BID as OP; only takes daily for past 6 months at least  See #1  3  CAD: stenting to D1 and RCA prior to 2015; no interventions on cath in 2015; denies any typical anginal symptoms; he did have some chest pain in past weeks associated with his SOB but this sounds more pleuritic in nature  Will check echo  Troponin negative  Continue ASA, statin BB  4  Severe COPD: on 3L NC as OP; on this currently; pulmon morena following; not in acute exacerbation  5  Pulmonary nodule: primary team and pulm managing; will need PET scan        History of Present Illness   HPI: Dakota Ibarra Sr  is a 69y o  year old male who has a history of CAD with prior stenting, pulmonary HTN, COPD and chronic diastolic CHF  He has been following with a cardiologist at [facility] but not seen in past 6 months and would like to switch back to [facility]; he has seen Dr Aneglica Holm in past      Pt presented to ED on 7/27 with complaints of SOB; present and worsening for 3 weeks  + orthopnea, cough, yellow sputum productive  Some right sided chest pain with inspiration  No weight gain or increased LE edema      CTA Chest: negative for PE, +lg right lung apex nodule, + moderate R sided pleural effusion  He was seen by pulmonary and underwent thoracentesis 7/28 with removal of 1L transudative fluid  This was felt to be 2/2 acute CHF although final cytology pending  NT-Pro , trop negative  Cardiology consulated for CHF  He does carry hx of chronic diastolic CHF and pulmonary HTN 2 2 to his severe COPD  Is prescribed lasix 40mg PO BID but only takes it once daily; has been doing this for past 6 months at least    Denies any wt gain or increased LE edema  Also has CAD with stenting of RCA and D1 prior to 2015  No recent cardiac testing  Likely eats a diet high in Na; admits to frozen dinners  Last echo 2018: Limited study, LV normal size and function, EF 65%  RV normal size and function, mild TR, severe pulmonary HTN with estimated PAP pressure of 70mmHg  EKG reviewed personally:  7/27/2020  Sinus tachycardia  RBBB    Telemetry reviewed personally:   No telemetry    Review of Systems   Constitution: Negative for decreased appetite and fever  Cardiovascular: Positive for chest pain (right sided with deep inspiration) and dyspnea on exertion  Negative for irregular heartbeat, leg swelling, orthopnea (resolved), palpitations and syncope  Respiratory: Positive for cough, sleep disturbances due to breathing, sputum production and wheezing  Gastrointestinal: Negative for bloating, abdominal pain, nausea and vomiting  Genitourinary: Negative for dysuria  Neurological: Negative for dizziness and light-headedness  Psychiatric/Behavioral: Negative for altered mental status        Historical Information   Past Medical History:   Diagnosis Date    Abdominal pain     Cardiac disease     CHF (congestive heart failure) (HCC)     COPD (chronic obstructive pulmonary disease) (HCC)     Coronary artery disease     Diabetes mellitus (HCC)     GERD (gastroesophageal reflux disease)     Hyperlipidemia     Hypertension     MI [facility]myocardial infarction) ([facility] [address] )     with 3 stents    Prostate cancer [facility])      Past Surgical History:   Procedure Laterality Date    ABDOMINAL SURGERY      exploratory    ANGIOPLASTY      3 stents    APPENDECTOMY      COLONOSCOPY  2015    ESOPHAGOGASTRODUODENOSCOPY N/A 10/2/2017    Procedure: ESOPHAGOGASTRODUODENOSCOPY (EGD); Surgeon: Ron Paredes MD;  Location: [facility]; Service: Gastroenterology    KNEE CARTILAGE SURGERY      OTHER SURGICAL HISTORY      stent placement    PROSTATE SURGERY      SKIN GRAFT      Basal cell CA back     Social History     Substance and Sexual Activity   Alcohol Use Not Currently    Frequency: Never    Binge frequency: Never     Social History     Substance and Sexual Activity   Drug Use No     Social History     Tobacco Use   Smoking Status Former Smoker    Packs/day: 1 50    Years: 50 00    Pack years: 75 00    Last attempt to quit: 2017    Years since quittin 8   Smokeless Tobacco Never Used     Family History:   Family History   Problem Relation Age of Onset    Heart disease Father     Other Father         Mesothelioma        Meds/Allergies   all current active meds have been reviewed       Allergies   Allergen Reactions    Metformin GI Intolerance     Objective   Vitals: Blood pressure 146/69, pulse 96, temperature 98 2 °F (36 8 °C), temperature source Oral, resp  rate 18, height 6' (1 829 m), weight 120 kg (265 lb 6 9 oz), SpO2 96 %  ,     Body mass index is 36 kg/m²  ,     Systolic (09MCH), VMV:046 , Min:143 , STEPHANY:518     Diastolic (52OXA), YMD:97, Min:69, Max:88      Intake/Output Summary (Last 24 hours) at 2020 1526  Last data filed at 2020 1307  Gross per 24 hour   Intake 1200 ml   Output 300 ml   Net 900 ml     Weight (last 2 days)     Date/Time   Weight    20 0600   120 (265 43)    20 0600   120 (264 55)    20 04:04:30   120 (264 55)    20 2016   127 (279)            Invasive Devices     Peripheral Intravenous Line            Peripheral IV 20 Right Antecubital 1 day              Physical Exam   Constitutional: He is oriented to person, place, and time  No distress  HENT:   Head: Normocephalic and atraumatic  Cardiovascular: Normal rate and regular rhythm  No murmur heard  Trace ankle edema   Pulmonary/Chest: Effort normal  He has no wheezes  He has no rales  LS very decreased at bases; on 3L NC   Abdominal: Soft  Musculoskeletal: He exhibits no deformity  Neurological: He is alert and oriented to person, place, and time  Skin: Skin is warm and dry  He is not diaphoretic  Psychiatric: He has a normal mood and affect  His behavior is normal    Nursing note and vitals reviewed      LABORATORY RESULTS:  Results from last 7 days   Lab Units 07/27/20 2043   TROPONIN I ng/mL <0 02     CBC with diff:   Results from last 7 days   Lab Units 07/28/20  0543 07/27/20 2043   WBC Thousand/uL 10 75* 16 28*   HEMOGLOBIN g/dL 11 8* 13 5   HEMATOCRIT % 39 7 45 1   MCV fL 86 85   PLATELETS Thousands/uL 180 212   MCH pg 25 5* 25 3*   MCHC g/dL 29 7* 29 9*   RDW % 14 5 14 4   MPV fL 11 2 10 7   NRBC AUTO /100 WBCs  --  0     CMP:  Results from last 7 days   Lab Units 07/28/20  0543 07/27/20  2043   POTASSIUM mmol/L 5 3 4 6   CHLORIDE mmol/L 94* 96*   CO2 mmol/L 37* 40*   BUN mg/dL 27* 28*   CREATININE mg/dL 1 41* 1 51*   CALCIUM mg/dL 8 5 10 1   AST U/L  --  34   ALT U/L  --  28   ALK PHOS U/L  --  102   EGFR ml/min/1 73sq m 49 45     BMP:  Results from last 7 days   Lab Units 07/28/20  0543 07/27/20  2043   POTASSIUM mmol/L 5 3 4 6   CHLORIDE mmol/L 94* 96*   CO2 mmol/L 37* 40*   BUN mg/dL 27* 28*   CREATININE mg/dL 1 41* 1 51*   CALCIUM mg/dL 8 5 10 1     Lab Results   Component Value Date    NTBNP 302 (H) 07/27/2020    NTBNP 363 (H) 06/14/2019    NTBNP 356 (H) 06/19/2018         Lipid Profile:   Lab Results   Component Value Date    CHOL 152 09/10/2015    CHOL 164 03/23/2015     Lab Results   Component Value Date    HDL 40 03/10/2020    HDL 52 12/09/2019    HDL 50 2019     Lab Results   Component Value Date    LDLCALC 74 03/10/2020    LDLCALC 60 2019    LDLCALC 64 2019     Lab Results   Component Value Date    TRIG 216 (H) 03/10/2020    TRIG 184 (H) 2019    TRIG 122 2019     Cardiac testing:   Results for orders placed during the hospital encounter of 18   Echo complete with contrast if indicated    Narrative [address]  [phone]    Transthoracic Echocardiogram  2D, M-mode, Doppler, and Color Doppler    Study date:  25-May-2018    Patient: Joan Inman  MR number: [id number]  Account number: [de-identified]  : 1945  Age: 81 years  Gender: Male  Status: Inpatient  Location: Bedside  Height: 72 in  Weight: 278 5 lb  BP: 131/ 65 mmHg    Indications: Shortness of Breath    Diagnoses: R06 02 - Shortness of breath    Sonographer:  Roby Rader RDCS  Primary Physician:  Ceferino Marsh MD  Referring Physician:  Erik Urrutia DO  Group:  [id number]  Interpreting Physician:  Cruzito Hopkins MD    SUMMARY    SUMMARY:  This was a technically difficult study  Echocardiographic views were limited due to decreased ultrasound penetration, and lung interference  The left ventricular size and function was normal with an estimated ejection fraction of 65%  There was moderate concentricc left ventricular hypertrophy  Although no obvious regional left ventricular wall motion abnormality was noted,  segmental wall motion could not be assessed adequately  The right ventricular size and function appeared normal in limited views  This study was not optimal for evaluation of cardiac valves  There was no pericardial effusion  HISTORY: PRIOR HISTORY: HLD, COPD, CKD  HTN, IDDM, CHF, NSTEMI, CAD    PROCEDURE: The procedure was performed at the bedside  This was a routine study  The transthoracic approach was used   The study included complete 2D imaging, M-mode, complete spectral Doppler, and color Doppler  The heart rate was 80 bpm,  at the start of the study  Images were obtained from the parasternal, apical, subcostal, and suprasternal notch acoustic windows  Echocardiographic views were limited due to decreased penetration and lung interference  This was a  technically difficult study  SYSTEM MEASUREMENT TABLES    2D mode  Aortic Root Diameter; User chosen value; 2D mode;: 3 7 cm  LA/Ao (2D): 0 92  Left Atrium Vega-posterior Systolic Dimension; User chosen value; 2D mode;: 3 4 cm  EDV (2D-Cubed): 72 5 cm3  EF (2D-Cubed): 57 7 %  ESV (2D-Cubed): 30 7 cm3  FS (2D-Cubed): 24 9 %  FS (2D-Teich): 24 9 %  IVS/LVPW (2D): 1 11  Interventricular Septum Diastolic Thickness; User chosen value; 2D mode;: 1 33 cm  Left Ventricle Internal End Diastolic Dimension; User chosen value; 2D mode;: 4 17 cm  Left Ventricle Internal Systolic Dimension; User chosen value; 2D mode;: 3 13 cm  Left Ventricle Posterior Wall Diastolic Thickness; User chosen value; 2D mode;: 1 2 cm  Left Ventricular Ejection Fraction; Teichholz; 2D mode;: 49 8 %  Left Ventricular End Diastolic Volume; Teichholz; 2D mode;: 77 3 cm3  Left Ventricular End Systolic Volume; Teichholz; 2D mode;: 38 8 cm3  SI (2D-Cubed): 17 1 ml/m2  SV (2D-Cubed): 41 8 cm3  Stroke Index; Teichholz; 2D mode;: 15 7 ml/m2  Stroke Volume; Teichholz; 2D mode;: 38 5 cm3    Apical four chamber  LV MOD Diam; Recent value; End Diastole (A4C): 2 3 cm  LV MOD Diam; Recent value; End Diastole (A4C): 4 72 cm  LV MOD Diam; Recent value; End Diastole (A4C): 4 97 cm  LV MOD Diam; Recent value; End Diastole (A4C): 5 13 cm  LV MOD Diam; Recent value; End Diastole (A4C): 5 09 cm  LV MOD Diam; Recent value; End Diastole (A4C): 4 93 cm  LV MOD Diam; Recent value; End Diastole (A4C): 1 9 cm  LV MOD Diam; Recent value; End Diastole (A4C): 2 67 cm  LV MOD Diam; Recent value; End Diastole (A4C): 3 19 cm  LV MOD Diam; Recent value;  End Diastole (A4C): 3 47 cm  LV MOD Diam; Recent value; End Diastole (A4C): 3 84 cm  LV MOD Diam; Recent value; End Diastole (A4C): 4 08 cm  LV MOD Diam; Recent value; End Diastole (A4C): 4 36 cm  LV MOD Diam; Recent value; End Diastole (A4C): 4 56 cm  LV MOD Diam; Recent value; End Diastole (A4C): 4 53 cm  LV MOD Diam; Recent value; End Diastole (A4C): 4 68 cm  LV MOD Diam; Recent value; End Diastole (A4C): 4 8 cm  LV MOD Diam; Recent value; End Diastole (A4C): 5 05 cm  LV MOD Diam; Recent value; End Diastole (A4C): 5 13 cm  LV MOD Diam; Recent value; End Diastole (A4C): 5 09 cm  LV MOD Diam; Recent value; End Systole (A4C): 1 6 cm  LV MOD Diam; Recent value; End Systole (A4C): 3 28 cm  LV MOD Diam; Recent value; End Systole (A4C): 3 24 cm  LV MOD Diam; Recent value; End Systole (A4C): 3 12 cm  LV MOD Diam; Recent value; End Systole (A4C): 2 88 cm  LV MOD Diam; Recent value; End Systole (A4C): 1 92 cm  LV MOD Diam; Recent value; End Systole (A4C): 2 16 cm  LV MOD Diam; Recent value; End Systole (A4C): 2 32 cm  LV MOD Diam; Recent value; End Systole (A4C): 2 48 cm  LV MOD Diam; Recent value; End Systole (A4C): 2 6 cm  LV MOD Diam; Recent value; End Systole (A4C): 2 64 cm  LV MOD Diam; Recent value; End Systole (A4C): 2 72 cm  LV MOD Diam; Recent value; End Systole (A4C): 2 76 cm  LV MOD Diam; Recent value; End Systole (A4C): 2 84 cm  LV MOD Diam; Recent value; End Systole (A4C): 0 84 cm  LV MOD Diam; Recent value; End Systole (A4C): 1 32 cm  LV MOD Diam; Recent value; End Systole (A4C): 1 6 cm  LV MOD Diam; Recent value; End Systole (A4C): 3 cm  LV MOD Diam; Recent value; End Systole (A4C): 3 2 cm  LV MOD Diam; Recent value; End Systole (A4C): 3 28 cm  LVEF MOD A4C: 70 %  Left Ventricle diastolic major axis; Most recent value chosen; Method of Disks, Single Plane; 2D mode; Apical four chamber;: 8 52 cm  Left Ventricle systolic major axis; Most recent value chosen; Method of Disks, Single Plane; 2D mode;  Apical four chamber;: 7 21 cm  Left Ventricular Diastolic Area; Most recent value chosen; Method of Disks, Single Plane; 2D mode; Apical four chamber;: 3610 mm2  Left Ventricular End Diastolic Volume; Most recent value chosen; Method of Disks, Single Plane; 2D mode; Apical four chamber;: 126 cm3  Left Ventricular End Systolic Volume; Most recent value chosen; Method of Disks, Single Plane; 2D mode; Apical four chamber;: 38 cm3  Left Ventricular Systolic Area; Most recent value chosen; Method of Disks, Single Plane; 2D mode; Apical four chamber;: 1800 mm2  SI (A4C): 35 9 ml/m2  SV MOD A4C: 88 cm3    Tissue Doppler Imaging  LV Peak Early Scott Tissue Jose; Medial MA (TDI): 60 3 mm/s  Left Ventricular Peak Early Diastolic Tissue Velocity; Mean; Mean value chosen; Medial Mitral Annulus; Tissue Doppler Imaging;: 60 3 mm/s    Unspecified Scan Mode  MV Peak Jose/LV Peak Tissue Jose E-Wave; Medial MA: 17 4  DT; Antegrade Flow: 195 ms  DT; Mean; Antegrade Flow: 195 ms  MV A Jose: 1140 mm/s  MV E Jose: 1050 mm/s  MV E/A Ratio: 0 9  MV Peak A Jose: 1140 mm/s  MV Peak E Jose; Mean; Antegrade Flow: 1050 mm/s    [employer] Accredited Echocardiography Laboratory    Prepared and electronically signed by    Tyler Rene MD  Signed [id number] 08:20:16       Imaging: I have personally reviewed pertinent reports  Xr Chest Portable    Result Date: 7/28/2020  Narrative: CHEST INDICATION:   post right-sided thoracentesis  COMPARISON:  7/27/2020 EXAM PERFORMED/VIEWS:  XR CHEST PORTABLE FINDINGS: Heart shadow is enlarged but unchanged from prior exam  There is a stable right pleural effusion with volume loss and right basilar atelectasis  There is no pneumothorax  Osseous structures appear within normal limits for patient age  Impression: No pneumothorax  Stable right pleural effusion with overlying atelectasis   Workstation performed: [id number]     Xr Chest 1 View Portable    Result Date: 7/28/2020  Narrative: CHEST INDICATION:   SOB  COMPARISON: 06/14/2019  Subsequently obtained CT of the chest  EXAM PERFORMED/VIEWS:  XR CHEST PORTABLE Images: 2 FINDINGS: Cardiomediastinal silhouette appears unremarkable  Pulmonary vessels are normal  Faint right upper lobe nodule correlating with the CT finding  Emphysematous changes  Moderate right pleural effusion  Cannot exclude infiltrate or atelectasis at the right lung base  Osseous structures appear within normal limits for patient age  Impression: Right upper lobe nodule correlating with the CT findings  Moderate right pleural effusion  Workstation performed: [id number]     Cta Ed Chest Pe Study    Result Date: 7/27/2020  Narrative: CTA - CHEST WITH IV CONTRAST - PULMONARY ANGIOGRAM INDICATION:   Dyspnea, chronic  COMPARISON: June 19, 2018 TECHNIQUE: CTA examination of the chest was performed using angiographic technique according to a protocol specifically tailored to evaluate for pulmonary embolism  Axial, sagittal, and coronal 2D reformatted images were created from the source data and  submitted for interpretation  In addition, coronal 3D MIP postprocessing was performed on the acquisition scanner  Radiation dose length product (DLP) for this visit:  1952 27 mGy-cm   This examination, like all CT scans performed in the [facility], was performed utilizing techniques to minimize radiation dose exposure, including the use of iterative reconstruction and automated exposure control  IV Contrast:  85 mL of iohexol (OMNIPAQUE)  FINDINGS: PULMONARY ARTERIAL TREE:  No pulmonary embolus is seen  LUNGS: There is a 1 3 cm right lung apex pulmonary nodule (series 4 image 25)  Emphysematous changes within the lungs are visualized  Consolidation in the right lower lobe is seen   Based on current Fleischner Society 2017 Guidelines on incidental pulmonary nodule, either PET/CT scan evaluation, tissue sampling or short term interval followup non-contrast CT followup (initailly in 3 months) may be considered appropriate  PLEURA:  Moderate right-sided pleural effusion is seen  HEART/GREAT VESSELS:  The heart is enlarged  Coronary artery calcifications are visualized  MEDIASTINUM AND JEREMÍAS:  Unremarkable  CHEST WALL AND LOWER NECK:   Unremarkable  VISUALIZED STRUCTURES IN THE UPPER ABDOMEN nodular hyperplasia of the adrenal glands is seen  Small hiatal hernia is visualized  OSSEOUS STRUCTURES:  No acute fracture or destructive osseous lesion  Impression: No evidence of pulmonary embolus Large pulmonary nodule in the right lung apex  Based on current Fleischner Society 2017 Guidelines on incidental pulmonary nodule, either PET/CT scan evaluation, tissue sampling or short term interval followup non-contrast CT followup (initailly in 3 months) may be considered appropriate  The study was marked in [facility] for immediate notification  Workstation performed: [id number]     Assessment  Principal Problem:    Shortness of breath  Active Problems:    Type 2 diabetes mellitus with hyperglycemia, with long-term current use of insulin (HCC)    COPD, severe (HCC)    Chronic kidney disease, stage 3 (HCC)    Pleural effusion on right    Chronic diastolic heart failure (HCC)    Chronic respiratory failure with hypoxia and hypercapnia (HCC)    Lung nodule    Thank you for allowing us to participate in this patient's care  This pt will follow up with Dr Margie Anne once discharged  Counseling / Coordination of Care  Total floor / unit time spent today 45 minutes  Greater than 50% of total time was spent with the patient and / or family counseling and / or coordination of care  A description of the counseling / coordination of care: Review of history, current assessment, development of a plan  Code Status: Level 1 - Full Code    ** Please Note: Dragon 360 Dictation voice to text software may have been used in the creation of this document   **

## 2020-07-29 NOTE — ASSESSMENT & PLAN NOTE
Wt Readings from Last 3 Encounters:   07/29/20 120 kg (265 lb 6 9 oz)   11/05/19 117 kg (257 lb)   08/22/19 116 kg (255 lb)     · Increased right-sided pleural effusion is noted  · Thoracentesis as per Pulmonary    · Monitor daily weights, I/O

## 2020-07-29 NOTE — ASSESSMENT & PLAN NOTE
Lab Results   Component Value Date    HGBA1C 9 5 (H) 03/10/2020       Recent Labs     07/28/20  1101 07/28/20  1622 07/28/20  2044 07/29/20  0736   POCGLU 359* 197* 272* 301*       Blood Sugar Average: Last 72 hrs:  · (P) 315 follows with [address] [place] [address] endocrinology regarding this  · For now continue U500 insulin 45 units b i d   Before breakfast/lunch  · Add SSI with Accu-Cheks and carb controlled diet  · Initiate hypoglycemia protocol

## 2020-07-29 NOTE — PROGRESS NOTES
Progress Note - Adelene Burkitt Sr  1945, 69 y o  male MRN: [id number]    Unit/Bed#: [facility] 928-01 Encounter: [id number]    Primary Care Provider: Mariaelena Pacheco MD   Date and time admitted to hospital: 7/27/2020  8:11 PM        * Shortness of breath  Assessment & Plan  · Patient presented with worsening shortness of breath shortly prior to arrival  · CTA PE study negative for pulmonary embolism however with moderate size right pleural effusion and new lung nodule noted  · Additionally COVID-19 nasal swab negative  · Consideration for possible COPD exacerbation and/or CHF exacerbation  Additionally pleural effusion may be playing role  · Currently on home 3L NC  · See plans below for further workup    Lung nodule  Assessment & Plan  · Patient noted with 1 3 cm right lung apex pulmonary nodule  A small nodule was noted on CT chest 06/2018  ·  pulmonary consult  Chronic respiratory failure with hypoxia and hypercapnia (HCC)  Assessment & Plan  · Multifactorial in setting of COPD, chronic diastolic heart failure, untreated JACQUELINE, suspect deconditioning  On 3 L NC at this time and remains at baseline  · Continue supplemental oxygen as above and titrate to maintain SaO2 88-94%  · Respiratory protocol and incentive spirometry    Chronic diastolic heart failure (HCC)  Assessment & Plan  Wt Readings from Last 3 Encounters:   07/29/20 120 kg (265 lb 6 9 oz)   11/05/19 117 kg (257 lb)   08/22/19 116 kg (255 lb)     · Increased right-sided pleural effusion is noted  · Thoracentesis as per Pulmonary    · Monitor daily weights, I/O        Pleural effusion on right  Assessment & Plan  · Moderate right-sided pleural effusion is noted, consideration for CHF as cause however with changed in size lung nodule noted as well  · Appreciate pulmonology evaluation    Chronic kidney disease, stage 3 (HCC)  Assessment & Plan  · Prior baseline noted at 1 2-1 4  · Monitor with BMP and will avoid/limit nephrotoxins    COPD, severe [facility])  Assessment & Plan  · Longstanding history of this, severe at baseline  ·   Markedly improved s/p DuoNeb and IV Solu-Medrol provided in ED  · Nebulizer treatments as per Pulmonary  ·   Pulmonary evaluation regarding nodule and pleural effusion  Outpatient follow-up regarding nodule  · Status post thoracentesis on     Type 2 diabetes mellitus with hyperglycemia, with long-term current use of insulin [facility])  Assessment & Plan  Lab Results   Component Value Date    HGBA1C 9 5 (H) 03/10/2020       Recent Labs     20  1101 20  1622 20  2044 20  0736   POCGLU 359* 197* 272* 301*       Blood Sugar Average: Last 72 hrs:  · (P) 315 follows with [facility] endocrinology regarding this  · For now continue U500 insulin 45 units b i d  Before breakfast/lunch  · Add SSI with Accu-Cheks and carb controlled diet  · Initiate hypoglycemia protocol        VTE Pharmacologic Prophylaxis:   Pharmacologic: Heparin  Mechanical VTE Prophylaxis in Place: No    Patient Centered Rounds: I have performed bedside rounds with nursing staff today  Time Spent for Care: 15 minutes  More than 50% of total time spent on counseling and coordination of care as described above  Current Length of Stay: 1 day(s)    Current Patient Status: Inpatient       Code Status: Level 1 - Full Code      Subjective:   No acute distress    Objective:     Vitals:   Temp (24hrs), Av °F (36 7 °C), Min:98 °F (36 7 °C), Max:98 1 °F (36 7 °C)    Temp:  [98 °F (36 7 °C)-98 1 °F (36 7 °C)] 98 1 °F (36 7 °C)  HR:  [92-98] 92  Resp:  [17-18] 18  BP: (143-154)/(69-88) 143/69  SpO2:  [93 %-97 %] 95 %  Body mass index is 36 kg/m²  Input and Output Summary (last 24 hours): Intake/Output Summary (Last 24 hours) at 2020 1044  Last data filed at 2020 0908  Gross per 24 hour   Intake 980 ml   Output    Net 980 ml       Physical Exam:     Physical Exam   Constitutional: He is oriented to person, place, and time  HENT:   Head: Normocephalic and atraumatic  Eyes: Pupils are equal, round, and reactive to light  Neck: Normal range of motion  Neck supple  Cardiovascular: Normal rate  Pulmonary/Chest: Effort normal and breath sounds normal  No stridor  No respiratory distress  Abdominal: Soft  Bowel sounds are normal    Musculoskeletal: Normal range of motion  He exhibits no edema  Neurological: He is alert and oriented to person, place, and time  Skin: Skin is warm and dry  Additional Data:     Labs:    Results from last 7 days   Lab Units 07/28/20  0543 07/27/20  2043   WBC Thousand/uL 10 75* 16 28*   HEMOGLOBIN g/dL 11 8* 13 5   HEMATOCRIT % 39 7 45 1   PLATELETS Thousands/uL 180 212   NEUTROS PCT %  --  89*   LYMPHS PCT %  --  6*   MONOS PCT %  --  4   EOS PCT %  --  0     Results from last 7 days   Lab Units 07/28/20  0543 07/27/20  2043   POTASSIUM mmol/L 5 3 4 6   CHLORIDE mmol/L 94* 96*   CO2 mmol/L 37* 40*   BUN mg/dL 27* 28*   CREATININE mg/dL 1 41* 1 51*   CALCIUM mg/dL 8 5 10 1   ALK PHOS U/L  --  102   ALT U/L  --  28   AST U/L  --  34           * I Have Reviewed All Lab Data Listed Above  * Additional Pertinent Lab Tests Reviewed:  All Labs Within Last 24 Hours Reviewed      Recent Cultures (last 7 days):     Results from last 7 days   Lab Units 07/28/20  1428   GRAM STAIN RESULT  No Polys or Bacteria seen       Last 24 Hours Medication List:     Current Facility-Administered Medications:  acetaminophen 975 mg Oral Q8H [address] [place],    aspirin 81 mg Oral Daily [place] DO   budesonide 0 5 mg Nebulization Q12H Odilia Rosenthal DO   cyclobenzaprine 10 mg Oral TID PRN Guzman Guevara MD   ferrous sulfate 325 mg Oral Daily With Breakfast [place] DO   fluticasone 1 spray Nasal Daily [place] DO   furosemide 40 mg Oral BID Hetul DO Akin   guaiFENesin 600 mg Oral Q12H [address] Odilia Rosenthal DO   heparin (porcine) 5,000 Units Subcutaneous [facility] [place] Cunningham DO   insulin lispro 1-6 Units Subcutaneous HS Jaden Kemp, DO   insulin lispro 2-12 Units Subcutaneous TID AC Jaden Kemp, DO   insulin regular 45 Units Subcutaneous BID before breakfast/lunch Hobella Kemp, DO   ipratropium 0 5 mg Nebulization TID Jaden Kemp, DO   levalbuterol 1 25 mg Nebulization TID Jaden Kemp, DO   lidocaine 1 patch Topical Daily Hobella Kemp, DO   metoprolol tartrate 25 mg Oral BID Hobella Kemp, DO   oxyCODONE 5 mg Oral Q4H PRN Marisol Gerardo, DO   pantoprazole 40 mg Oral Daily Hobella Kemp, DO   pravastatin 80 mg Oral Daily With Prakash Multani, DO   tamsulosin 0 4 mg Oral Daily Jaden Kemp, DO        Today, Patient Was Seen By: Aristides Espino DO    ** Please Note: Dictation voice to text software may have been used in the creation of this document   **

## 2020-07-29 NOTE — ASSESSMENT & PLAN NOTE
· Longstanding history of this, severe at baseline  ·   Markedly improved s/p DuoNeb and IV Solu-Medrol provided in ED  · Nebulizer treatments as per Pulmonary  ·   Pulmonary evaluation regarding nodule and pleural effusion  Outpatient follow-up regarding nodule    · Status post thoracentesis on July 28th

## 2020-07-30 ENCOUNTER — APPOINTMENT (INPATIENT)
Dept: NON INVASIVE DIAGNOSTICS | Facility: HOSPITAL | Age: 75
DRG: 291 | End: 2020-07-30
Payer: COMMERCIAL

## 2020-07-30 LAB
ANION GAP SERPL CALCULATED.3IONS-SCNC: 1 MMOL/L (ref 4–13)
BASOPHILS # BLD AUTO: 0.03 THOUSANDS/ΜL (ref 0–0.1)
BASOPHILS NFR BLD AUTO: 0 % (ref 0–1)
BUN SERPL-MCNC: 39 MG/DL (ref 5–25)
CALCIUM SERPL-MCNC: 8.2 MG/DL (ref 8.3–10.1)
CHLORIDE SERPL-SCNC: 95 MMOL/L (ref 100–108)
CO2 SERPL-SCNC: 39 MMOL/L (ref 21–32)
CREAT SERPL-MCNC: 1.51 MG/DL (ref 0.6–1.3)
EOSINOPHIL # BLD AUTO: 0.07 THOUSAND/ΜL (ref 0–0.61)
EOSINOPHIL NFR BLD AUTO: 1 % (ref 0–6)
ERYTHROCYTE [DISTWIDTH] IN BLOOD BY AUTOMATED COUNT: 14.6 % (ref 11.6–15.1)
GFR SERPL CREATININE-BSD FRML MDRD: 45 ML/MIN/1.73SQ M
GLUCOSE SERPL-MCNC: 194 MG/DL (ref 65–140)
GLUCOSE SERPL-MCNC: 252 MG/DL (ref 65–140)
GLUCOSE SERPL-MCNC: 256 MG/DL (ref 65–140)
GLUCOSE SERPL-MCNC: 258 MG/DL (ref 65–140)
GLUCOSE SERPL-MCNC: 349 MG/DL (ref 65–140)
HCT VFR BLD AUTO: 35.8 % (ref 36.5–49.3)
HGB BLD-MCNC: 10.5 G/DL (ref 12–17)
IMM GRANULOCYTES # BLD AUTO: 0.06 THOUSAND/UL (ref 0–0.2)
IMM GRANULOCYTES NFR BLD AUTO: 1 % (ref 0–2)
LDH SERPL-CCNC: 126 U/L (ref 81–234)
LYMPHOCYTES # BLD AUTO: 1.27 THOUSANDS/ΜL (ref 0.6–4.47)
LYMPHOCYTES NFR BLD AUTO: 12 % (ref 14–44)
MCH RBC QN AUTO: 25.3 PG (ref 26.8–34.3)
MCHC RBC AUTO-ENTMCNC: 29.3 G/DL (ref 31.4–37.4)
MCV RBC AUTO: 86 FL (ref 82–98)
MONOCYTES # BLD AUTO: 0.94 THOUSAND/ΜL (ref 0.17–1.22)
MONOCYTES NFR BLD AUTO: 9 % (ref 4–12)
NEUTROPHILS # BLD AUTO: 8.21 THOUSANDS/ΜL (ref 1.85–7.62)
NEUTS SEG NFR BLD AUTO: 77 % (ref 43–75)
NRBC BLD AUTO-RTO: 0 /100 WBCS
PLATELET # BLD AUTO: 147 THOUSANDS/UL (ref 149–390)
PMV BLD AUTO: 11.5 FL (ref 8.9–12.7)
POTASSIUM SERPL-SCNC: 4.1 MMOL/L (ref 3.5–5.3)
PROT SERPL-MCNC: 6.6 G/DL (ref 6.4–8.2)
RBC # BLD AUTO: 4.15 MILLION/UL (ref 3.88–5.62)
SODIUM SERPL-SCNC: 135 MMOL/L (ref 136–145)
WBC # BLD AUTO: 10.58 THOUSAND/UL (ref 4.31–10.16)

## 2020-07-30 PROCEDURE — 99232 SBSQ HOSP IP/OBS MODERATE 35: CPT | Performed by: INTERNAL MEDICINE

## 2020-07-30 PROCEDURE — 82948 REAGENT STRIP/BLOOD GLUCOSE: CPT

## 2020-07-30 PROCEDURE — 84155 ASSAY OF PROTEIN SERUM: CPT | Performed by: INTERNAL MEDICINE

## 2020-07-30 PROCEDURE — 94640 AIRWAY INHALATION TREATMENT: CPT

## 2020-07-30 PROCEDURE — 83615 LACTATE (LD) (LDH) ENZYME: CPT | Performed by: INTERNAL MEDICINE

## 2020-07-30 PROCEDURE — 85025 COMPLETE CBC W/AUTO DIFF WBC: CPT | Performed by: INTERNAL MEDICINE

## 2020-07-30 PROCEDURE — C8929 TTE W OR WO FOL WCON,DOPPLER: HCPCS

## 2020-07-30 PROCEDURE — 94760 N-INVAS EAR/PLS OXIMETRY 1: CPT

## 2020-07-30 PROCEDURE — 93306 TTE W/DOPPLER COMPLETE: CPT | Performed by: INTERNAL MEDICINE

## 2020-07-30 PROCEDURE — 80048 BASIC METABOLIC PNL TOTAL CA: CPT | Performed by: INTERNAL MEDICINE

## 2020-07-30 RX ADMIN — IPRATROPIUM BROMIDE 0.5 MG: 0.5 SOLUTION RESPIRATORY (INHALATION) at 19:14

## 2020-07-30 RX ADMIN — METOPROLOL TARTRATE 25 MG: 25 TABLET, FILM COATED ORAL at 18:12

## 2020-07-30 RX ADMIN — ASPIRIN 81 MG: 81 TABLET, COATED ORAL at 08:43

## 2020-07-30 RX ADMIN — LEVALBUTEROL HYDROCHLORIDE 1.25 MG: 1.25 SOLUTION, CONCENTRATE RESPIRATORY (INHALATION) at 13:14

## 2020-07-30 RX ADMIN — LIDOCAINE 1 PATCH: 50 PATCH TOPICAL at 08:40

## 2020-07-30 RX ADMIN — GUAIFENESIN 600 MG: 600 TABLET, EXTENDED RELEASE ORAL at 08:43

## 2020-07-30 RX ADMIN — TAMSULOSIN HYDROCHLORIDE 0.4 MG: 0.4 CAPSULE ORAL at 08:43

## 2020-07-30 RX ADMIN — INSULIN LISPRO 8 UNITS: 100 INJECTION, SOLUTION INTRAVENOUS; SUBCUTANEOUS at 12:24

## 2020-07-30 RX ADMIN — FUROSEMIDE 40 MG: 10 INJECTION, SOLUTION INTRAMUSCULAR; INTRAVENOUS at 08:33

## 2020-07-30 RX ADMIN — HEPARIN SODIUM 5000 UNITS: 5000 INJECTION INTRAVENOUS; SUBCUTANEOUS at 06:06

## 2020-07-30 RX ADMIN — LEVALBUTEROL HYDROCHLORIDE 1.25 MG: 1.25 SOLUTION, CONCENTRATE RESPIRATORY (INHALATION) at 19:14

## 2020-07-30 RX ADMIN — FUROSEMIDE 40 MG: 10 INJECTION, SOLUTION INTRAMUSCULAR; INTRAVENOUS at 18:13

## 2020-07-30 RX ADMIN — INSULIN LISPRO 2 UNITS: 100 INJECTION, SOLUTION INTRAVENOUS; SUBCUTANEOUS at 18:12

## 2020-07-30 RX ADMIN — HEPARIN SODIUM 5000 UNITS: 5000 INJECTION INTRAVENOUS; SUBCUTANEOUS at 15:02

## 2020-07-30 RX ADMIN — INSULIN HUMAN 45 UNITS: 500 INJECTION, SOLUTION SUBCUTANEOUS at 12:24

## 2020-07-30 RX ADMIN — BUDESONIDE 0.5 MG: 0.5 INHALANT RESPIRATORY (INHALATION) at 07:22

## 2020-07-30 RX ADMIN — FERROUS SULFATE TAB 325 MG (65 MG ELEMENTAL FE) 325 MG: 325 (65 FE) TAB at 08:42

## 2020-07-30 RX ADMIN — GUAIFENESIN 600 MG: 600 TABLET, EXTENDED RELEASE ORAL at 21:38

## 2020-07-30 RX ADMIN — PERFLUTREN 0.4 ML/MIN: 6.52 INJECTION, SUSPENSION INTRAVENOUS at 10:23

## 2020-07-30 RX ADMIN — INSULIN LISPRO 6 UNITS: 100 INJECTION, SOLUTION INTRAVENOUS; SUBCUTANEOUS at 08:31

## 2020-07-30 RX ADMIN — IPRATROPIUM BROMIDE 0.5 MG: 0.5 SOLUTION RESPIRATORY (INHALATION) at 07:22

## 2020-07-30 RX ADMIN — OXYCODONE HYDROCHLORIDE 5 MG: 5 TABLET ORAL at 01:58

## 2020-07-30 RX ADMIN — OXYCODONE HYDROCHLORIDE 5 MG: 5 TABLET ORAL at 21:44

## 2020-07-30 RX ADMIN — INSULIN LISPRO 3 UNITS: 100 INJECTION, SOLUTION INTRAVENOUS; SUBCUTANEOUS at 21:38

## 2020-07-30 RX ADMIN — OXYCODONE HYDROCHLORIDE 5 MG: 5 TABLET ORAL at 08:38

## 2020-07-30 RX ADMIN — PRAVASTATIN SODIUM 80 MG: 80 TABLET ORAL at 18:12

## 2020-07-30 RX ADMIN — LEVALBUTEROL HYDROCHLORIDE 1.25 MG: 1.25 SOLUTION, CONCENTRATE RESPIRATORY (INHALATION) at 07:22

## 2020-07-30 RX ADMIN — HEPARIN SODIUM 5000 UNITS: 5000 INJECTION INTRAVENOUS; SUBCUTANEOUS at 21:37

## 2020-07-30 RX ADMIN — INSULIN HUMAN 45 UNITS: 500 INJECTION, SOLUTION SUBCUTANEOUS at 08:32

## 2020-07-30 RX ADMIN — BUDESONIDE 0.5 MG: 0.5 INHALANT RESPIRATORY (INHALATION) at 19:15

## 2020-07-30 RX ADMIN — IPRATROPIUM BROMIDE 0.5 MG: 0.5 SOLUTION RESPIRATORY (INHALATION) at 13:14

## 2020-07-30 RX ADMIN — METOPROLOL TARTRATE 25 MG: 25 TABLET, FILM COATED ORAL at 08:43

## 2020-07-30 RX ADMIN — PANTOPRAZOLE SODIUM 40 MG: 40 TABLET, DELAYED RELEASE ORAL at 08:43

## 2020-07-30 NOTE — ASSESSMENT & PLAN NOTE
Lab Results   Component Value Date    HGBA1C 9 5 (H) 03/10/2020       Recent Labs     07/29/20  1124 07/29/20  1647 07/29/20  2047 07/30/20  0809   POCGLU 352* 195* 209* 256*       Blood Sugar Average: Last 72 hrs:  · (P) 368 [id number] follows with [facility] endocrinology regarding this  · For now continue U500 insulin 45 units b i d   Before breakfast/lunch  · Add SSI with Accu-Cheks and carb controlled diet  · Initiate hypoglycemia protocol

## 2020-07-30 NOTE — ASSESSMENT & PLAN NOTE
Wt Readings from Last 3 Encounters:   07/30/20 119 kg (262 lb 5 6 oz)   11/05/19 117 kg (257 lb)   08/22/19 116 kg (255 lb)     · Increased right-sided pleural effusion is noted  · Thoracentesis as per Pulmonary    · Monitor daily weights, I/O

## 2020-07-30 NOTE — PROGRESS NOTES
Progress Note - Asia Jimenez Sr  1945, 69 y o  male MRN: [id number]    Unit/Bed#: [facility] 928-01 Encounter: [id number]    Primary Care Provider: Gerald Candelario MD   Date and time admitted to hospital: 7/27/2020  8:11 PM        * Shortness of breath  Assessment & Plan  · Patient presented with worsening shortness of breath shortly prior to arrival  · CTA PE study negative for pulmonary embolism however with moderate size right pleural effusion and new lung nodule noted  · Additionally COVID-19 nasal swab negative  · Consideration for possible COPD exacerbation and/or CHF exacerbation  Additionally pleural effusion may be playing role  · Currently on home 3L NC  · See plans below for further workup    Lung nodule  Assessment & Plan  · Patient noted with 1 3 cm right lung apex pulmonary nodule  A small nodule was noted on CT chest 06/2018  ·  pulmonary consult  Chronic respiratory failure with hypoxia and hypercapnia (HCC)  Assessment & Plan  · Multifactorial in setting of COPD, chronic diastolic heart failure, untreated JACQUELINE, suspect deconditioning  On 3 L NC at this time and remains at baseline  · Continue supplemental oxygen as above and titrate to maintain SaO2 88-94%  · Respiratory protocol and incentive spirometry    Chronic diastolic heart failure (HCC)  Assessment & Plan  Wt Readings from Last 3 Encounters:   07/30/20 119 kg (262 lb 5 6 oz)   11/05/19 117 kg (257 lb)   08/22/19 116 kg (255 lb)     · Increased right-sided pleural effusion is noted  · Thoracentesis as per Pulmonary    · Monitor daily weights, I/O        Pleural effusion on right  Assessment & Plan  · Moderate right-sided pleural effusion is noted, consideration for CHF as cause however with changed in size lung nodule noted as well  · Appreciate pulmonology evaluation    Chronic kidney disease, stage 3 (HCC)  Assessment & Plan  · Prior baseline noted at 1 2-1 4  · Monitor with BMP and will avoid/limit nephrotoxins    COPD, severe [facility])  Assessment & Plan  · Longstanding history of this, severe at baseline  ·   Markedly improved s/p DuoNeb and IV Solu-Medrol provided in ED  · Nebulizer treatments as per Pulmonary  ·   Pulmonary evaluation regarding nodule and pleural effusion  Outpatient follow-up regarding nodule  · Status post thoracentesis on     Type 2 diabetes mellitus with hyperglycemia, with long-term current use of insulin [facility])  Assessment & Plan  Lab Results   Component Value Date    HGBA1C 9 5 (H) 03/10/2020       Recent Labs     20  1124 20  1647 20  2047 20  0809   POCGLU 352* 195* 209* 256*       Blood Sugar Average: Last 72 hrs:  · (P) 066 [id number] follows with [facility] endocrinology regarding this  · For now continue U500 insulin 45 units b i d  Before breakfast/lunch  · Add SSI with Accu-Cheks and carb controlled diet  · Initiate hypoglycemia protocol      VTE Pharmacologic Prophylaxis:   Pharmacologic: Heparin  Mechanical VTE Prophylaxis in Place: No    Patient Centered Rounds: I have performed bedside rounds with nursing staff today  Time Spent for Care: 15 minutes  More than 50% of total time spent on counseling and coordination of care as described above  Current Length of Stay: 2 day(s)    Current Patient Status: Inpatient   Certification Statement: The patient will continue to require additional inpatient hospital stay due to Need to monitor symptoms    Code Status: Level 1 - Full Code      Subjective:   No acute distress    Objective:     Vitals:   Temp (24hrs), Av 2 °F (36 8 °C), Min:98 °F (36 7 °C), Max:98 5 °F (36 9 °C)    Temp:  [98 °F (36 7 °C)-98 5 °F (36 9 °C)] 98 5 °F (36 9 °C)  HR:  [] 116  Resp:  [18-20] 18  BP: (140-158)/(68-83) 158/83  SpO2:  [91 %-96 %] 91 %  Body mass index is 35 58 kg/m²  Input and Output Summary (last 24 hours):        Intake/Output Summary (Last 24 hours) at 2020 1007  Last data filed at 2020 0900  Gross per 24 hour   Intake 1080 ml   Output 1725 ml   Net -645 ml       Physical Exam:     Physical Exam   Constitutional: He is oriented to person, place, and time  HENT:   Head: Normocephalic and atraumatic  Cardiovascular: Normal rate and regular rhythm  Pulmonary/Chest: Effort normal  No stridor  No respiratory distress  He has wheezes  Abdominal: Soft  Bowel sounds are normal    Musculoskeletal: Normal range of motion  He exhibits no edema or deformity  Neurological: He is alert and oriented to person, place, and time  No cranial nerve deficit  Additional Data:     Labs:    Results from last 7 days   Lab Units 07/30/20  0516   WBC Thousand/uL 10 58*   HEMOGLOBIN g/dL 10 5*   HEMATOCRIT % 35 8*   PLATELETS Thousands/uL 147*   NEUTROS PCT % 77*   LYMPHS PCT % 12*   MONOS PCT % 9   EOS PCT % 1     Results from last 7 days   Lab Units 07/30/20  0516  07/27/20  2043   POTASSIUM mmol/L 4 1   < > 4 6   CHLORIDE mmol/L 95*   < > 96*   CO2 mmol/L 39*   < > 40*   BUN mg/dL 39*   < > 28*   CREATININE mg/dL 1 51*   < > 1 51*   CALCIUM mg/dL 8 2*   < > 10 1   ALK PHOS U/L  --   --  102   ALT U/L  --   --  28   AST U/L  --   --  34    < > = values in this interval not displayed  * I Have Reviewed All Lab Data Listed Above  * Additional Pertinent Lab Tests Reviewed:  All Labs Within Last 24 Hours Reviewed        Recent Cultures (last 7 days):     Results from last 7 days   Lab Units 07/28/20  1428   GRAM STAIN RESULT  No Polys or Bacteria seen   BODY FLUID CULTURE, STERILE  No growth       Last 24 Hours Medication List:     Current Facility-Administered Medications:  acetaminophen 975 mg Oral Q8H [address] Hoy Kemp, DO   aspirin 81 mg Oral Daily Jaden Kemp, DO   budesonide 0 5 mg Nebulization Q12H Odilia Rosenthal DO   cyclobenzaprine 10 mg Oral TID PRN Mya Madden MD   ferrous sulfate 325 mg Oral Daily With Breakfast Hobella Kemp, DO   fluticasone 1 spray Nasal Daily Hobella Kemp, DO furosemide 40 mg Intravenous BID (diuretic) Hetul Gerardo, DO   guaiFENesin 600 mg Oral Q12H [facility] Odilia Rosenthal, DO   heparin (porcine) 5,000 Units Subcutaneous Q8H [facility]   insulin lispro 1-6 Units Subcutaneous HS [facility]   insulin lispro 2-12 Units Subcutaneous TID AC Dianawenceslao Flannery, DO   insulin regular 45 Units Subcutaneous BID before breakfast/lunch Benson Flannery, DO   ipratropium 0 5 mg Nebulization TID [facility]   levalbuterol 1 25 mg Nebulization TID [facility]   lidocaine 1 patch Topical Daily Dianawenceslao Flannery, DO   metoprolol tartrate 25 mg Oral BID Dianawenceslao Flannery, DO   oxyCODONE 5 mg Oral Q4H PRN Marisol Greardo, DO   pantoprazole 40 mg Oral Daily Dianawenceslao Flannery, DO   pravastatin 80 mg Oral Daily With Inder Chávez, DO   tamsulosin 0 4 mg Oral Daily Dianawenceslao Flannery, DO        Today, Patient Was Seen By: Maryln Mohs, DO    ** Please Note: Dictation voice to text software may have been used in the creation of this document   **

## 2020-07-30 NOTE — PROGRESS NOTES
General Cardiology   Progress Note -  Team One   Segundo Elam Sr  69 y o  male MRN: [id number]    Unit/Bed#: [facility] 928-01 Encounter: [id number]    Assessment/ Plan:    1  Pleural effusion: Right sided, partially loculated s/p thoracentesis 7/28 with 1L transudative fluid  Thought to be 2/2 to acute CHF; awaiting final cytology  Started on IV lasix trial; 40mg BID  UO 1300 after first dose lasix  I/Os: net +1 2L  Wt: 262lbs today (down from 264 on admission); pt reports typical wt of 260lbs  Cr 1 51 today  No change in his breathing status per patient  He is not volume overloaded on exam today; no LE edema or JVD; LS just decreased throughout; no clear rales  OK to continue IV lasix for today; check Cr in AM; do not anticipate long course of IV lasix needed  Awaiting repeat echo  2  Chronic diastolic CHF: prescribed lasix 40mg PO BID as OP; only takes daily for past 6 months at least    3  CAD: stenting to D1 and RCA prior to 2015; no interventions on cath in 2015; denies any typical anginal symptoms; he did have some chest pain in past weeks associated with his SOB but this sounds more pleuritic in nature  Checking echo;  Continue ASA, statin and beta blocker  4  Severe COPD: on 3L NC as OP; on this currently; pulmon morena following; not in acute exacerbation  5  Pulmonary nodule: primary team and pulm managing; will need PET scan       Subjective: pt seen for follow up  He reports feeling ok today; no change in his breathing status; he is still SOB with exertion; no chest pain, palpitations, dizziness or lightheadedness; he does not appreciate any increase in his UP with IV lasix dose  Review of Systems   Constitution: Negative for decreased appetite and fever  Cardiovascular: Positive for dyspnea on exertion  Negative for chest pain, irregular heartbeat, leg swelling, orthopnea, palpitations and syncope  Respiratory: Positive for cough   Negative for shortness of breath, sleep disturbances due to breathing and wheezing  Gastrointestinal: Negative for nausea and vomiting  Genitourinary: Negative for dysuria  Neurological: Negative for dizziness and light-headedness  Psychiatric/Behavioral: Negative for altered mental status  Objective:   Vitals: Blood pressure 158/83, pulse 90, temperature 98 °F (36 7 °C), temperature source Oral, resp  rate 20, height 6' (1 829 m), weight 119 kg (262 lb 5 6 oz), SpO2 95 %  ,     Body mass index is 35 58 kg/m²  ,     Systolic (38DBW), AEZ:294 , Min:140 , FGY:169     Diastolic (32PLO), NQH:92, Min:68, Max:83      Intake/Output Summary (Last 24 hours) at 7/30/2020 0851  Last data filed at 7/30/2020 0516  Gross per 24 hour   Intake 1320 ml   Output 1300 ml   Net 20 ml     Weight (last 2 days)     Date/Time   Weight    07/30/20 0600   119 (262 35)    07/29/20 0600   120 (265 43)    07/28/20 0600   120 (264 55)    07/28/20 04:04:30   120 (264 55)            Telemetry Review:   No telemetry    Physical Exam   Constitutional: He is oriented to person, place, and time  No distress  Pt sitting up in bed in  NAD; alert and cooperative   HENT:   Head: Normocephalic and atraumatic  Neck: No JVD present  Cardiovascular: Normal rate, regular rhythm, S1 normal and S2 normal    No murmur heard  No LE edema   Pulmonary/Chest: Effort normal  He has no wheezes  He has no rales  BS decreased throughout; no rales, no wheezing; on 3L NC   Abdominal: Soft  Musculoskeletal: He exhibits no edema  Neurological: He is alert and oriented to person, place, and time  Skin: Skin is warm and dry  He is not diaphoretic  Psychiatric: He has a normal mood and affect  His behavior is normal    Nursing note and vitals reviewed      LABORATORY RESULTS  Results from last 7 days   Lab Units 07/27/20 2043   TROPONIN I ng/mL <0 02     CBC with diff:   Results from last 7 days   Lab Units 07/30/20  0516 07/28/20  0543 07/27/20 2043   WBC Thousand/uL 10 58* 10 75* 16 28*   HEMOGLOBIN g/dL 10 5* 11 8* 13 5   HEMATOCRIT % 35 8* 39 7 45 1   MCV fL 86 86 85   PLATELETS Thousands/uL 147* 180 212   MCH pg 25 3* 25 5* 25 3*   MCHC g/dL 29 3* 29 7* 29 9*   RDW % 14 6 14 5 14 4   MPV fL 11 5 11 2 10 7   NRBC AUTO /100 WBCs 0  --  0     CMP:  Results from last 7 days   Lab Units 20  0516 20  0543 20  2043   POTASSIUM mmol/L 4 1 5 3 4 6   CHLORIDE mmol/L 95* 94* 96*   CO2 mmol/L 39* 37* 40*   BUN mg/dL 39* 27* 28*   CREATININE mg/dL 1 51* 1 41* 1 51*   CALCIUM mg/dL 8 2* 8 5 10 1   AST U/L  --   --  34   ALT U/L  --   --  28   ALK PHOS U/L  --   --  102   EGFR ml/min/1 73sq m 45 49 45     BMP:  Results from last 7 days   Lab Units 2016 20  0543 203   POTASSIUM mmol/L 4 1 5 3 4 6   CHLORIDE mmol/L 95* 94* 96*   CO2 mmol/L 39* 37* 40*   BUN mg/dL 39* 27* 28*   CREATININE mg/dL 1 51* 1 41* 1 51*   CALCIUM mg/dL 8 2* 8 5 10 1     Lab Results   Component Value Date    NTBNP 302 (H) 2020    NTBNP 363 (H) 2019    NTBNP 356 (H) 2018     Lipid Profile:   Lab Results   Component Value Date    CHOL 152 09/10/2015    CHOL 164 2015     Lab Results   Component Value Date    HDL 40 03/10/2020    HDL 52 2019    HDL 50 2019     Lab Results   Component Value Date    LDLCALC 74 03/10/2020    LDLCALC 60 2019    LDLCALC 64 2019     Lab Results   Component Value Date    TRIG 216 (H) 03/10/2020    TRIG 184 (H) 2019    TRIG 122 2019     Cardiac testing:   Results for orders placed during the hospital encounter of 18   Echo complete with contrast if indicated    Narrative Michael [address]  [facility] [place] [address] [facility]  [phone]    Transthoracic Echocardiogram  2D, M-mode, Doppler, and Color Doppler    Study date:  25-May-2018    Patient: Everette Thrasher  MR number: [id number]  Account number: [de-identified]  : 1945  Age: 81 years  Gender: Male  Status: Inpatient  Location: Bedside  Height: 72 in  Weight: 278 5 lb  BP: 131/ 65 mmHg    Indications: Shortness of Breath    Diagnoses: R06 02 - Shortness of breath    Sonographer:  Irena House RDCS  Primary Physician:  Tatiana Light MD  Referring Physician:  Agnieszka Desai DO  Group:  [id number]  Interpreting Physician:  Nino Lake MD    SUMMARY    SUMMARY:  This was a technically difficult study  Echocardiographic views were limited due to decreased ultrasound penetration, and lung interference  The left ventricular size and function was normal with an estimated ejection fraction of 65%  There was moderate concentricc left ventricular hypertrophy  Although no obvious regional left ventricular wall motion abnormality was noted,  segmental wall motion could not be assessed adequately  The right ventricular size and function appeared normal in limited views  This study was not optimal for evaluation of cardiac valves  There was no pericardial effusion  HISTORY: PRIOR HISTORY: HLD, COPD, CKD  HTN, IDDM, CHF, NSTEMI, CAD    PROCEDURE: The procedure was performed at the bedside  This was a routine study  The transthoracic approach was used  The study included complete 2D imaging, M-mode, complete spectral Doppler, and color Doppler  The heart rate was 80 bpm,  at the start of the study  Images were obtained from the parasternal, apical, subcostal, and suprasternal notch acoustic windows  Echocardiographic views were limited due to decreased penetration and lung interference  This was a  technically difficult study      SYSTEM MEASUREMENT TABLES    2D mode  Aortic Root Diameter; User chosen value; 2D mode;: 3 7 cm  LA/Ao (2D): 0 92  Left Atrium Vega-posterior Systolic Dimension; User chosen value; 2D mode;: 3 4 cm  EDV (2D-Cubed): 72 5 cm3  EF (2D-Cubed): 57 7 %  ESV (2D-Cubed): 30 7 cm3  FS (2D-Cubed): 24 9 %  FS (2D-Teich): 24 9 %  IVS/LVPW (2D): 1 11  Interventricular Septum Diastolic Thickness; User chosen value; 2D mode;: 1 33 cm  Left Ventricle Internal End Diastolic Dimension; User chosen value; 2D mode;: 4 17 cm  Left Ventricle Internal Systolic Dimension; User chosen value; 2D mode;: 3 13 cm  Left Ventricle Posterior Wall Diastolic Thickness; User chosen value; 2D mode;: 1 2 cm  Left Ventricular Ejection Fraction; Teichholz; 2D mode;: 49 8 %  Left Ventricular End Diastolic Volume; Teichholz; 2D mode;: 77 3 cm3  Left Ventricular End Systolic Volume; Teichholz; 2D mode;: 38 8 cm3  SI (2D-Cubed): 17 1 ml/m2  SV (2D-Cubed): 41 8 cm3  Stroke Index; Teichholz; 2D mode;: 15 7 ml/m2  Stroke Volume; Teichholz; 2D mode;: 38 5 cm3    Apical four chamber  LV MOD Diam; Recent value; End Diastole (A4C): 2 3 cm  LV MOD Diam; Recent value; End Diastole (A4C): 4 72 cm  LV MOD Diam; Recent value; End Diastole (A4C): 4 97 cm  LV MOD Diam; Recent value; End Diastole (A4C): 5 13 cm  LV MOD Diam; Recent value; End Diastole (A4C): 5 09 cm  LV MOD Diam; Recent value; End Diastole (A4C): 4 93 cm  LV MOD Diam; Recent value; End Diastole (A4C): 1 9 cm  LV MOD Diam; Recent value; End Diastole (A4C): 2 67 cm  LV MOD Diam; Recent value; End Diastole (A4C): 3 19 cm  LV MOD Diam; Recent value; End Diastole (A4C): 3 47 cm  LV MOD Diam; Recent value; End Diastole (A4C): 3 84 cm  LV MOD Diam; Recent value; End Diastole (A4C): 4 08 cm  LV MOD Diam; Recent value; End Diastole (A4C): 4 36 cm  LV MOD Diam; Recent value; End Diastole (A4C): 4 56 cm  LV MOD Diam; Recent value; End Diastole (A4C): 4 53 cm  LV MOD Diam; Recent value; End Diastole (A4C): 4 68 cm  LV MOD Diam; Recent value; End Diastole (A4C): 4 8 cm  LV MOD Diam; Recent value; End Diastole (A4C): 5 05 cm  LV MOD Diam; Recent value; End Diastole (A4C): 5 13 cm  LV MOD Diam; Recent value; End Diastole (A4C): 5 09 cm  LV MOD Diam; Recent value; End Systole (A4C): 1 6 cm  LV MOD Diam; Recent value; End Systole (A4C): 3 28 cm  LV MOD Diam; Recent value;  End Systole (A4C): 3 24 cm  LV MOD Diam; Recent value; End Systole (A4C): 3 12 cm  LV MOD Diam; Recent value; End Systole (A4C): 2 88 cm  LV MOD Diam; Recent value; End Systole (A4C): 1 92 cm  LV MOD Diam; Recent value; End Systole (A4C): 2 16 cm  LV MOD Diam; Recent value; End Systole (A4C): 2 32 cm  LV MOD Diam; Recent value; End Systole (A4C): 2 48 cm  LV MOD Diam; Recent value; End Systole (A4C): 2 6 cm  LV MOD Diam; Recent value; End Systole (A4C): 2 64 cm  LV MOD Diam; Recent value; End Systole (A4C): 2 72 cm  LV MOD Diam; Recent value; End Systole (A4C): 2 76 cm  LV MOD Diam; Recent value; End Systole (A4C): 2 84 cm  LV MOD Diam; Recent value; End Systole (A4C): 0 84 cm  LV MOD Diam; Recent value; End Systole (A4C): 1 32 cm  LV MOD Diam; Recent value; End Systole (A4C): 1 6 cm  LV MOD Diam; Recent value; End Systole (A4C): 3 cm  LV MOD Diam; Recent value; End Systole (A4C): 3 2 cm  LV MOD Diam; Recent value; End Systole (A4C): 3 28 cm  LVEF MOD A4C: 70 %  Left Ventricle diastolic major axis; Most recent value chosen; Method of Disks, Single Plane; 2D mode; Apical four chamber;: 8 52 cm  Left Ventricle systolic major axis; Most recent value chosen; Method of Disks, Single Plane; 2D mode; Apical four chamber;: 7 21 cm  Left Ventricular Diastolic Area; Most recent value chosen; Method of Disks, Single Plane; 2D mode; Apical four chamber;: 3610 mm2  Left Ventricular End Diastolic Volume; Most recent value chosen; Method of Disks, Single Plane; 2D mode; Apical four chamber;: 126 cm3  Left Ventricular End Systolic Volume; Most recent value chosen; Method of Disks, Single Plane; 2D mode; Apical four chamber;: 38 cm3  Left Ventricular Systolic Area; Most recent value chosen; Method of Disks, Single Plane; 2D mode;  Apical four chamber;: 1800 mm2  SI (A4C): 35 9 ml/m2  SV MOD A4C: 88 cm3    Tissue Doppler Imaging  LV Peak Early Scott Tissue Jose; Medial MA (TDI): 60 3 mm/s  Left Ventricular Peak Early Diastolic Tissue Velocity; Mean; Mean value chosen; Medial Mitral Annulus; Tissue Doppler Imaging;: 60 3 mm/s    Unspecified Scan Mode  MV Peak Jose/LV Peak Tissue Jose E-Wave; Medial MA: 17 4  DT; Antegrade Flow: 195 ms  DT; Mean; Antegrade Flow: 195 ms  MV A Jose: 1140 mm/s  MV E Jose: 1050 mm/s  MV E/A Ratio: 0 9  MV Peak A Jose: 1140 mm/s  MV Peak E Jose; Mean; Antegrade Flow: 1050 mm/s    [facility]    Prepared and electronically signed by    Mariya Jimenez MD  Signed [id number] 08:20:16       Meds/Allergies   all current active meds have been reviewed  Medications Prior to Admission   Medication    aspirin (ECOTRIN LOW STRENGTH) 81 mg EC tablet    cyclobenzaprine (FLEXERIL) 10 mg tablet    furosemide (LASIX) 40 mg tablet    metoprolol tartrate (LOPRESSOR) 25 mg tablet    Omega-3 Fatty Acids (FISH OIL) 645 MG CAPS    ONE TOUCH ULTRA TEST test strip    oxyCODONE-acetaminophen (PERCOCET) 7 5-325 MG per tablet    potassium chloride (K-DUR,KLOR-CON) 20 mEq tablet    simvastatin (ZOCOR) 40 mg tablet    tamsulosin (FLOMAX) 0 4 mg    TRELEGY ELLIPTA 100-62 5-25 MCG/INH inhaler    ferrous sulfate 325 (65 Fe) mg tablet    fluticasone (FLONASE) 50 mcg/act nasal spray    Insulin Syringe/Needle U-500 (BD Insulin Syringe U-500) 31G X 6MM 0 5 ML MISC    levalbuterol (XOPENEX) 1 25 mg/3 mL nebulizer solution    pantoprazole (PROTONIX) 40 mg tablet     Assessment:  Principal Problem:    Shortness of breath  Active Problems:    Type 2 diabetes mellitus with hyperglycemia, with long-term current use of insulin (HCC)    COPD, severe (HCC)    Chronic kidney disease, stage 3 (HCC)    Pleural effusion on right    Chronic diastolic heart failure (HCC)    Chronic respiratory failure with hypoxia and hypercapnia (HCC)    Lung nodule    Counseling / Coordination of Care  Total floor / unit time spent today 20 minutes    Greater than 50% of total time was spent with the patient and / or family counseling and / or coordination of care       ** Please Note: Dragon 360 Dictation voice to text software may have been used in the creation of this document   **

## 2020-07-30 NOTE — PROGRESS NOTES
PULMONOLOGY PROGRESS NOTE     Name: Memory Canavan    Age & Sex: 69 y o  male   MRN: [id number]  Unit/Bed#: [facility] 928-01   Encounter: [id number]    PATIENT INFORMATION     Name: Memory Canavan    Age & Sex: 69 y o  male   MRN: [id number]  Hospital Stay Days: 2    ASSESSMENT/PLAN     Principal Problem:    Shortness of breath  Active Problems:    Type 2 diabetes mellitus with hyperglycemia, with long-term current use of insulin (HCC)    COPD, severe (HCC)    Chronic kidney disease, stage 3 (HCC)    Pleural effusion on right    Chronic diastolic heart failure (HCC)    Chronic respiratory failure with hypoxia and hypercapnia (HCC)    Lung nodule      Assessment:      1  Chronic hypoxic respiratory failure on 3 L nasal cannula oxygen baseline  2  Very severe COPD - currently not in exacerbation  On Trelegy as outpatient  3  Severe pulmonary hypertension, group II/III; pulmonary artery pressure 70 mmHg  4  Chronic diastolic heart failure; grade 2 diastolic dysfunction  5  Recurrent right-sided pleural effusion - currently moderate  6  1 3 cm pulmonary nodule - right apex  7  Suspect underlying obesity hypoventilation syndrome  8  Suspect underlying obstructive sleep apnea  9  History of nicotine dependence     Plan:      -continue supplemental oxygen and titrate as tolerated; in goal baseline 3 L with SpO2 > 88%  -continue Atrovent/Xopenex/Pulmicort and Mucinex  -status thoracentesis 07/28/2020; exudative and removed 1000 cc of serosanguinous fluid  -Patient will need outpatient PET Scan and PFTs and potential IR biopsy of lung nodule versus EBUS  -patient may benefit from CPAP at night  -continue respiratory protocol  -diuretics per primary and Cardiology  -given patient's smoking history and appearance of 1 3 cm right lung nodule not previously noted in CT scan in 2018 suspicion for malignancy  Cancer probability Jana Diony prediction is 24 3%    -patient has recurrent right-sided pleural effusion likely secondary to medical noncompliance with once daily diuretic use and unknown dietary compliance  Given patient's 1 3 cm lung nodule malignancy causing pleural effusion is on differential   -resume home inhalers on discharge    SUBJECTIVE     Patient seen and examined  No acute events overnight  Patient sitting up eating breakfast without any distress  Patient does not have any complaints this morning  He is anxious to have his lung nodule biopsy  Reports shortness of breath improved status post thoracentesis  Denies fever, chills, headaches, lightheadedness, dizziness, visual disturbances, sore throat, chest pain, palpitations, abdominal pain, nausea, vomiting, or trouble urinating/ defecating  OBJECTIVE     Vitals:    20 2319 20 0600 20 0722 20 0833   BP: 140/68   158/83   Pulse: 90   (!) 116   Resp: 20   18   Temp: 98 °F (36 7 °C)   98 5 °F (36 9 °C)   TempSrc: Oral      SpO2: 96%  95% 91%   Weight:  119 kg (262 lb 5 6 oz)     Height:          Temperature:   Temp (24hrs), Av 2 °F (36 8 °C), Min:98 °F (36 7 °C), Max:98 5 °F (36 9 °C)    Temperature: 98 5 °F (36 9 °C)  Intake & Output:  I/O       701 -  07 -  -  0700    P  O  740 1320 240    I V  (mL/kg)       Total Intake(mL/kg) 740 (6 2) 1320 (11 1) 240 (2)    Urine (mL/kg/hr)  1300 (0 5) 425 (1 3)    Total Output  1300 425    Net +740 +20 -185           Unmeasured Urine Occurrence 12 x 4 x     Unmeasured Stool Occurrence 3 x          Weights:   IBW: 77 6 kg    Body mass index is 35 58 kg/m²  Weight (last 2 days)     Date/Time   Weight    20 06   119 (262 35)    20 06   120 (265 43)    20 0600   120 (264 55)    20 04:04:30   120 (264 55)            Physical Exam   Constitutional: He is oriented to person, place, and time  He appears well-developed and well-nourished  Non-toxic appearance  He does not appear ill  No distress     HENT:   Head: Normocephalic and atraumatic  Mouth/Throat: Oropharynx is clear and moist    Eyes: Pupils are equal, round, and reactive to light  EOM are normal    Neck: No JVD present  No tracheal deviation present  Cardiovascular: Normal rate, regular rhythm, normal heart sounds and intact distal pulses  Exam reveals no gallop  No murmur heard  Pulmonary/Chest: Effort normal  No accessory muscle usage or stridor  No apnea, no tachypnea and no bradypnea  No respiratory distress  He has decreased breath sounds in the right lower field  He has no wheezes  He has no rhonchi  He has no rales  Chest wall is dull to percussion  He exhibits no mass, no tenderness, no laceration, no crepitus, no edema, no deformity, no swelling and no retraction  Abdominal: Soft  Bowel sounds are normal  He exhibits no distension  There is no tenderness  There is no rebound and no guarding  Musculoskeletal:        Right lower leg: He exhibits no edema  Left lower leg: He exhibits no edema  Neurological: He is alert and oriented to person, place, and time  Skin: Skin is warm and dry  Vitals reviewed  LABORATORY DATA     Labs: I have personally reviewed pertinent reports    Results from last 7 days   Lab Units 07/30/20  0516 07/28/20  0543 07/27/20 2043   WBC Thousand/uL 10 58* 10 75* 16 28*   HEMOGLOBIN g/dL 10 5* 11 8* 13 5   HEMATOCRIT % 35 8* 39 7 45 1   PLATELETS Thousands/uL 147* 180 212   NEUTROS PCT % 77*  --  89*   MONOS PCT % 9  --  4      Results from last 7 days   Lab Units 07/30/20  0516 07/28/20  0543 07/27/20  2043   POTASSIUM mmol/L 4 1 5 3 4 6   CHLORIDE mmol/L 95* 94* 96*   CO2 mmol/L 39* 37* 40*   BUN mg/dL 39* 27* 28*   CREATININE mg/dL 1 51* 1 41* 1 51*   CALCIUM mg/dL 8 2* 8 5 10 1   ALK PHOS U/L  --   --  102   ALT U/L  --   --  28   AST U/L  --   --  34                      Results from last 7 days   Lab Units 07/27/20  2043   TROPONIN I ng/mL <0 02         ABG:       IMAGING & DIAGNOSTIC TESTING Radiology Results: I have personally reviewed pertinent reports  Xr Chest Portable    Result Date: 7/28/2020  Impression: No pneumothorax  Stable right pleural effusion with overlying atelectasis  Workstation performed: [id number]     Xr Chest 1 View Portable    Result Date: 7/28/2020  Impression: Right upper lobe nodule correlating with the CT findings  Moderate right pleural effusion  Workstation performed: [id number]     Cta Ed Chest Pe Study    Result Date: 7/27/2020  Impression: No evidence of pulmonary embolus Large pulmonary nodule in the right lung apex  Based on current Fleischner Society 2017 Guidelines on incidental pulmonary nodule, either PET/CT scan evaluation, tissue sampling or short term interval followup non-contrast CT followup (initailly in 3 months) may be considered appropriate  The study was marked in [facility] for immediate notification  Workstation performed: [id number]     Other Diagnostic Testing: I have personally reviewed pertinent reports      ACTIVE MEDICATIONS     Current Facility-Administered Medications   Medication Dose Route Frequency    acetaminophen (TYLENOL) tablet 975 mg  975 mg Oral Q8H [facility]    aspirin (ECOTRIN LOW STRENGTH) EC tablet 81 mg  81 mg Oral Daily    budesonide (PULMICORT) inhalation solution 0 5 mg  0 5 mg Nebulization Q12H    cyclobenzaprine (FLEXERIL) tablet 10 mg  10 mg Oral TID PRN    ferrous sulfate tablet 325 mg  325 mg Oral Daily With Breakfast    fluticasone (FLONASE) 50 mcg/act nasal spray 1 spray  1 spray Nasal Daily    furosemide (LASIX) injection 40 mg  40 mg Intravenous BID (diuretic)    guaiFENesin (MUCINEX) 12 hr tablet 600 mg  600 mg Oral Q12H [facility]    heparin (porcine) subcutaneous injection 5,000 Units  5,000 Units Subcutaneous Q8H [facility]    insulin lispro (HumaLOG) 100 units/mL subcutaneous injection 1-6 Units  1-6 Units Subcutaneous HS    insulin lispro (HumaLOG) 100 units/mL subcutaneous injection 2-12 Units  2-12 Units Subcutaneous TID AC  insulin regular (HumuLIN R U-500) 500 units/mL CONCENTRATED injection 45 Units  45 Units Subcutaneous BID before breakfast/lunch    ipratropium (ATROVENT) 0 02 % inhalation solution 0 5 mg  0 5 mg Nebulization TID    levalbuterol (XOPENEX) inhalation solution 1 25 mg  1 25 mg Nebulization TID    lidocaine (LIDODERM) 5 % patch 1 patch  1 patch Topical Daily    metoprolol tartrate (LOPRESSOR) tablet 25 mg  25 mg Oral BID    oxyCODONE (ROXICODONE) IR tablet 5 mg  5 mg Oral Q4H PRN    pantoprazole (PROTONIX) EC tablet 40 mg  40 mg Oral Daily    pravastatin (PRAVACHOL) tablet 80 mg  80 mg Oral Daily With Dinner    tamsulosin (FLOMAX) capsule 0 4 mg  0 4 mg Oral Daily       Portions of the record may have been created with voice recognition software  Occasional wrong word or "sound a like" substitutions may have occurred due to the inherent limitations of voice recognition software    Read the chart carefully and recognize, using context, where substitutions have occurred   ==  Odilia Huerta, [address] [place] [address] [employer] PGY-4

## 2020-07-30 NOTE — DISCHARGE INSTRUCTIONS
Take your medications as directed, and keep your follow up appointments  Adhere to a heart healthy lifestyle, maintaining a low sodium diet  Daily weight and record  If your weight increases 2-3 lbs in one day, or 5 lbs in 2 days, you are short of breath or have lower extremity swelling, please call the heart failure team at  [facility] at [phone]

## 2020-07-31 VITALS
WEIGHT: 261.02 LBS | HEIGHT: 72 IN | SYSTOLIC BLOOD PRESSURE: 125 MMHG | BODY MASS INDEX: 35.35 KG/M2 | RESPIRATION RATE: 18 BRPM | HEART RATE: 113 BPM | TEMPERATURE: 100.4 F | OXYGEN SATURATION: 96 % | DIASTOLIC BLOOD PRESSURE: 72 MMHG

## 2020-07-31 LAB
ANION GAP SERPL CALCULATED.3IONS-SCNC: 3 MMOL/L (ref 4–13)
BACTERIA SPEC BFLD CULT: NO GROWTH
BUN SERPL-MCNC: 32 MG/DL (ref 5–25)
CALCIUM SERPL-MCNC: 8.5 MG/DL (ref 8.3–10.1)
CHLORIDE SERPL-SCNC: 92 MMOL/L (ref 100–108)
CO2 SERPL-SCNC: 40 MMOL/L (ref 21–32)
CREAT SERPL-MCNC: 1.26 MG/DL (ref 0.6–1.3)
GFR SERPL CREATININE-BSD FRML MDRD: 57 ML/MIN/1.73SQ M
GLUCOSE SERPL-MCNC: 217 MG/DL (ref 65–140)
GLUCOSE SERPL-MCNC: 240 MG/DL (ref 65–140)
GLUCOSE SERPL-MCNC: 362 MG/DL (ref 65–140)
GRAM STN SPEC: NORMAL
POTASSIUM SERPL-SCNC: 3.8 MMOL/L (ref 3.5–5.3)
SODIUM SERPL-SCNC: 135 MMOL/L (ref 136–145)

## 2020-07-31 PROCEDURE — 94760 N-INVAS EAR/PLS OXIMETRY 1: CPT

## 2020-07-31 PROCEDURE — 99232 SBSQ HOSP IP/OBS MODERATE 35: CPT | Performed by: INTERNAL MEDICINE

## 2020-07-31 PROCEDURE — 99238 HOSP IP/OBS DSCHRG MGMT 30/<: CPT | Performed by: INTERNAL MEDICINE

## 2020-07-31 PROCEDURE — 94640 AIRWAY INHALATION TREATMENT: CPT

## 2020-07-31 PROCEDURE — 99233 SBSQ HOSP IP/OBS HIGH 50: CPT | Performed by: INTERNAL MEDICINE

## 2020-07-31 PROCEDURE — 82948 REAGENT STRIP/BLOOD GLUCOSE: CPT

## 2020-07-31 PROCEDURE — 80048 BASIC METABOLIC PNL TOTAL CA: CPT | Performed by: NURSE PRACTITIONER

## 2020-07-31 RX ORDER — BUDESONIDE 0.5 MG/2ML
0.5 INHALANT ORAL
Qty: 1 VIAL | Refills: 0 | Status: SHIPPED | OUTPATIENT
Start: 2020-07-31 | End: 2020-11-17 | Stop reason: ALTCHOICE

## 2020-07-31 RX ORDER — GUAIFENESIN 600 MG
600 TABLET, EXTENDED RELEASE 12 HR ORAL EVERY 12 HOURS SCHEDULED
Qty: 30 TABLET | Refills: 0 | Status: SHIPPED | OUTPATIENT
Start: 2020-07-31 | End: 2021-03-12 | Stop reason: SDUPTHER

## 2020-07-31 RX ORDER — FUROSEMIDE 20 MG/1
60 TABLET ORAL DAILY
Qty: 90 TABLET | Refills: 0 | Status: SHIPPED | OUTPATIENT
Start: 2020-08-01 | End: 2020-11-19 | Stop reason: SDUPTHER

## 2020-07-31 RX ORDER — OXYCODONE HYDROCHLORIDE 5 MG/1
5 TABLET ORAL EVERY 4 HOURS PRN
Qty: 30 TABLET | Refills: 0 | Status: SHIPPED | OUTPATIENT
Start: 2020-07-31 | End: 2020-08-10

## 2020-07-31 RX ADMIN — ASPIRIN 81 MG: 81 TABLET, COATED ORAL at 08:59

## 2020-07-31 RX ADMIN — IPRATROPIUM BROMIDE 0.5 MG: 0.5 SOLUTION RESPIRATORY (INHALATION) at 13:04

## 2020-07-31 RX ADMIN — GUAIFENESIN 600 MG: 600 TABLET, EXTENDED RELEASE ORAL at 08:59

## 2020-07-31 RX ADMIN — BUDESONIDE 0.5 MG: 0.5 INHALANT RESPIRATORY (INHALATION) at 07:14

## 2020-07-31 RX ADMIN — TAMSULOSIN HYDROCHLORIDE 0.4 MG: 0.4 CAPSULE ORAL at 09:04

## 2020-07-31 RX ADMIN — FUROSEMIDE 60 MG: 40 TABLET ORAL at 08:58

## 2020-07-31 RX ADMIN — IPRATROPIUM BROMIDE 0.5 MG: 0.5 SOLUTION RESPIRATORY (INHALATION) at 07:14

## 2020-07-31 RX ADMIN — LEVALBUTEROL HYDROCHLORIDE 1.25 MG: 1.25 SOLUTION, CONCENTRATE RESPIRATORY (INHALATION) at 13:04

## 2020-07-31 RX ADMIN — LIDOCAINE 1 PATCH: 50 PATCH TOPICAL at 08:59

## 2020-07-31 RX ADMIN — INSULIN HUMAN 45 UNITS: 500 INJECTION, SOLUTION SUBCUTANEOUS at 11:59

## 2020-07-31 RX ADMIN — INSULIN LISPRO 10 UNITS: 100 INJECTION, SOLUTION INTRAVENOUS; SUBCUTANEOUS at 12:00

## 2020-07-31 RX ADMIN — HEPARIN SODIUM 5000 UNITS: 5000 INJECTION INTRAVENOUS; SUBCUTANEOUS at 06:28

## 2020-07-31 RX ADMIN — LEVALBUTEROL HYDROCHLORIDE 1.25 MG: 1.25 SOLUTION, CONCENTRATE RESPIRATORY (INHALATION) at 07:14

## 2020-07-31 RX ADMIN — INSULIN HUMAN 45 UNITS: 500 INJECTION, SOLUTION SUBCUTANEOUS at 09:01

## 2020-07-31 RX ADMIN — PANTOPRAZOLE SODIUM 40 MG: 40 TABLET, DELAYED RELEASE ORAL at 08:59

## 2020-07-31 RX ADMIN — INSULIN LISPRO 4 UNITS: 100 INJECTION, SOLUTION INTRAVENOUS; SUBCUTANEOUS at 08:51

## 2020-07-31 RX ADMIN — FERROUS SULFATE TAB 325 MG (65 MG ELEMENTAL FE) 325 MG: 325 (65 FE) TAB at 08:59

## 2020-07-31 RX ADMIN — OXYCODONE HYDROCHLORIDE 5 MG: 5 TABLET ORAL at 06:28

## 2020-07-31 NOTE — SOCIAL WORK
CM informed pt will be medically stable for  d/c today  Pt will be d/c to home with [facility]  AVS faxed to [facility] F: [phone]  Anticipate family transport

## 2020-07-31 NOTE — ASSESSMENT & PLAN NOTE
Lab Results   Component Value Date    HGBA1C 9 5 (H) 03/10/2020       Recent Labs     07/30/20  1108 07/30/20  1656 07/30/20  2042 07/31/20  0716   POCGLU 349* 194* 252* 240*       Blood Sugar Average: Last 72 hrs:  · (P) 420 [id number] follows with [facility] endocrinology regarding this  · For now continue U500 insulin 45 units b i d   Before breakfast/lunch  · Add SSI with Accu-Cheks and carb controlled diet  · Initiate hypoglycemia protocol

## 2020-07-31 NOTE — ASSESSMENT & PLAN NOTE
Wt Readings from Last 3 Encounters:   07/31/20 118 kg (261 lb 0 4 oz)   11/05/19 117 kg (257 lb)   08/22/19 116 kg (255 lb)     · Increased right-sided pleural effusion is noted  · Thoracentesis as per Pulmonary    · Monitor daily weights, I/O

## 2020-07-31 NOTE — PROGRESS NOTES
Progress Note - Mary Calixto Sr  1945, 69 y o  male MRN: [id number]    Unit/Bed#: [facility] 928-01 Encounter: [id number]    Primary Care Provider: Nick Choudhury MD   Date and time admitted to hospital: 7/27/2020  8:11 PM        * Shortness of breath  Assessment & Plan  · Patient presented with worsening shortness of breath shortly prior to arrival  · CTA PE study negative for pulmonary embolism however with moderate size right pleural effusion and new lung nodule noted  · Additionally COVID-19 nasal swab negative  · Consideration for possible COPD exacerbation and/or CHF exacerbation  Additionally pleural effusion may be playing role  · Pulmonary will schedule outpatient follow-up and also PET scan and then will plan further workup including IR biopsy and EBUS also PFTs as outpatient  Patient has suspected JACQUELINE/OHS but patient declines workup or CPAP if needed  · Note Pulmonary input regarding IR  Will need to reassess assess stability of nodule as outpatient  Will need to follow-up with pulmonary post PET scan to arrange further assessment regarding optimal tissue sampling        Lung nodule  Assessment & Plan  · Patient noted with 1 3 cm right lung apex pulmonary nodule  A small nodule was noted on CT chest 06/2018  ·  pulmonary consult  Chronic respiratory failure with hypoxia and hypercapnia (HCC)  Assessment & Plan  · Multifactorial in setting of COPD, chronic diastolic heart failure, untreated JACQUELINE, suspect deconditioning  On 3 L NC at this time and remains at baseline  · Continue supplemental oxygen as above and titrate to maintain SaO2 88-94%  · Respiratory protocol and incentive spirometry    Chronic diastolic heart failure (HCC)  Assessment & Plan  Wt Readings from Last 3 Encounters:   07/31/20 118 kg (261 lb 0 4 oz)   11/05/19 117 kg (257 lb)   08/22/19 116 kg (255 lb)     · Increased right-sided pleural effusion is noted  · Thoracentesis as per Pulmonary    · Monitor daily weights, I/O        Pleural effusion on right  Assessment & Plan  · Moderate right-sided pleural effusion is noted, consideration for CHF as cause however with changed in size lung nodule noted as well  · Appreciate pulmonology evaluation    Chronic kidney disease, stage 3 ([facility] [address] )  Assessment & Plan  · Prior baseline noted at 1 2-1 4  · Monitor with BMP and will avoid/limit nephrotoxins    COPD, severe ([facility] [address] )  Assessment & Plan  · Longstanding history of this, severe at baseline  ·   Markedly improved s/p DuoNeb and IV Solu-Medrol provided in ED  · Nebulizer treatments as per Pulmonary  ·   Pulmonary evaluation regarding nodule and pleural effusion  Outpatient follow-up regarding nodule  · Status post thoracentesis on     Type 2 diabetes mellitus with hyperglycemia, with long-term current use of insulin [facility])  Assessment & Plan  Lab Results   Component Value Date    HGBA1C 9 5 (H) 03/10/2020       Recent Labs     20  1108 20  1656 20  2042 20  0716   POCGLU 349* 194* 252* 240*       Blood Sugar Average: Last 72 hrs:  · (P) 858 [id number] follows with [facility] endocrinology regarding this  · For now continue U500 insulin 45 units b i d  Before breakfast/lunch  · Add SSI with Accu-Cheks and carb controlled diet  · Initiate hypoglycemia protocol      VTE Pharmacologic Prophylaxis:   Pharmacologic: Heparin  Mechanical VTE Prophylaxis in Place: No    Patient Centered Rounds: I have performed bedside rounds with nursing staff today  Time Spent for Care: 15 minutes  More than 50% of total time spent on counseling and coordination of care as described above      Current Length of Stay: 3 day(s)    Current Patient Status: Inpatient   Certification Statement: The patient will continue to require additional inpatient hospital stay due to Need to monitor symptoms        Code Status: Level 1 - Full Code      Subjective:   No acute distress    Objective:     Vitals:   Temp (24hrs), Av 2 °F (36 8 °C), Min:97 8 °F (36 6 °C), Max:98 6 °F (37 °C)    Temp:  [97 8 °F (36 6 °C)-98 6 °F (37 °C)] 98 6 °F (37 °C)  HR:  [] 86  Resp:  [16-19] 16  BP: (126-141)/(54-81) 128/77  SpO2:  [94 %-96 %] 94 %  Body mass index is 35 4 kg/m²  Input and Output Summary (last 24 hours): Intake/Output Summary (Last 24 hours) at 7/31/2020 0854  Last data filed at 7/31/2020 0352  Gross per 24 hour   Intake 1360 ml   Output 3050 ml   Net -1690 ml       Physical Exam:     Physical Exam    Additional Data:     Labs:    Results from last 7 days   Lab Units 07/30/20  0516   WBC Thousand/uL 10 58*   HEMOGLOBIN g/dL 10 5*   HEMATOCRIT % 35 8*   PLATELETS Thousands/uL 147*   NEUTROS PCT % 77*   LYMPHS PCT % 12*   MONOS PCT % 9   EOS PCT % 1     Results from last 7 days   Lab Units 07/31/20  0538  07/27/20  2043   POTASSIUM mmol/L 3 8   < > 4 6   CHLORIDE mmol/L 92*   < > 96*   CO2 mmol/L 40*   < > 40*   BUN mg/dL 32*   < > 28*   CREATININE mg/dL 1 26   < > 1 51*   CALCIUM mg/dL 8 5   < > 10 1   ALK PHOS U/L  --   --  102   ALT U/L  --   --  28   AST U/L  --   --  34    < > = values in this interval not displayed  * I Have Reviewed All Lab Data Listed Above  * Additional Pertinent Lab Tests Reviewed:  All Labs Within Last 24 Hours Reviewed        Recent Cultures (last 7 days):     Results from last 7 days   Lab Units 07/28/20  1428   GRAM STAIN RESULT  No Polys or Bacteria seen   BODY FLUID CULTURE, STERILE  No growth       Last 24 Hours Medication List:     Current Facility-Administered Medications:  acetaminophen 975 mg Oral Q8H [address] Aime Dux, DO   aspirin 81 mg Oral Daily Aime Dux, DO   budesonide 0 5 mg Nebulization Q12H Odilia Rosenthal DO   cyclobenzaprine 10 mg Oral TID PRN Inessa Prakash MD   ferrous sulfate 325 mg Oral Daily With Breakfast Aime Dux, DO   fluticasone 1 spray Nasal Daily Aime Dux, DO   furosemide 60 mg Oral Daily YOLANDE Garcia   guaiFENesin 600 mg Oral Q12H Minoo, DO   heparin (porcine) 5,000 Units Subcutaneous [facility] Evlyn Salle, DO   insulin lispro 1-6 Units Subcutaneous HS Evlyn Salle, DO   insulin lispro 2-12 Units Subcutaneous TID AC Evlyn Salle, DO   insulin regular 45 Units Subcutaneous BID before breakfast/lunch Evlyn Salle, DO   ipratropium 0 5 mg Nebulization TID Evlyn Salle, DO   levalbuterol 1 25 mg Nebulization TID Evlyn Salle, DO   lidocaine 1 patch Topical Daily Evlyn Salle, DO   metoprolol tartrate 25 mg Oral BID Evlyn Salle, DO   oxyCODONE 5 mg Oral Q4H PRN Hetul Gerardo, DO   pantoprazole 40 mg Oral Daily Evlyn Salle, DO   pravastatin 80 mg Oral Daily With Evelyn Vicente, DO   tamsulosin 0 4 mg Oral Daily Evlyn Salle, DO        Today, Patient Was Seen By: Sammi Nascimento DO    ** Please Note: Dictation voice to text software may have been used in the creation of this document   **

## 2020-07-31 NOTE — DISCHARGE SUMMARY
Discharge- Stu Melendrez Sr  1945, 69 y o  male MRN: [id number]    Unit/Bed#: [facility] 928-01 Encounter: [id number]    Primary Care Provider: Lois Hutchison MD   Date and time admitted to hospital: 7/27/2020  8:11 PM        * Shortness of breath  Assessment & Plan  · Patient presented with worsening shortness of breath shortly prior to arrival  · CTA PE study negative for pulmonary embolism however with moderate size right pleural effusion and new lung nodule noted  · Additionally COVID-19 nasal swab negative  · Consideration for possible COPD exacerbation and/or CHF exacerbation  Additionally pleural effusion may be playing role  · Pulmonary will schedule outpatient follow-up and also PET scan and then will plan further workup including IR biopsy and EBUS also PFTs as outpatient  Patient has suspected JACQUELINE/OHS but patient declines workup or CPAP if needed  · Note Pulmonary input regarding IR  Will need to reassess assess stability of nodule as outpatient  Will need to follow-up with pulmonary post PET scan to arrange further assessment regarding optimal tissue sampling        Lung nodule  Assessment & Plan  · Patient noted with 1 3 cm right lung apex pulmonary nodule  A small nodule was noted on CT chest 06/2018  ·  pulmonary consult  Chronic respiratory failure with hypoxia and hypercapnia (HCC)  Assessment & Plan  · Multifactorial in setting of COPD, chronic diastolic heart failure, untreated JACQUELINE, suspect deconditioning  On 3 L NC at this time and remains at baseline  · Continue supplemental oxygen as above and titrate to maintain SaO2 88-94%  · Respiratory protocol and incentive spirometry    Chronic diastolic heart failure (HCC)  Assessment & Plan  Wt Readings from Last 3 Encounters:   07/31/20 118 kg (261 lb 0 4 oz)   11/05/19 117 kg (257 lb)   08/22/19 116 kg (255 lb)     · Increased right-sided pleural effusion is noted  · Thoracentesis as per Pulmonary    · Monitor daily weights, I/O        Pleural effusion on right  Assessment & Plan  · Moderate right-sided pleural effusion is noted, consideration for CHF as cause however with changed in size lung nodule noted as well  · Appreciate pulmonology evaluation    Chronic kidney disease, stage 3 ([facility] [address] )  Assessment & Plan  · Prior baseline noted at 1 2-1 4  · Monitor with BMP and will avoid/limit nephrotoxins    COPD, severe ([facility] [address] )  Assessment & Plan  · Longstanding history of this, severe at baseline  ·   Markedly improved s/p DuoNeb and IV Solu-Medrol provided in ED  · Nebulizer treatments as per Pulmonary  ·   Pulmonary evaluation regarding nodule and pleural effusion  Outpatient follow-up regarding nodule  · Status post thoracentesis on July 28th    Type 2 diabetes mellitus with hyperglycemia, with long-term current use of insulin [facility])  Assessment & Plan  Lab Results   Component Value Date    HGBA1C 9 5 (H) 03/10/2020       Recent Labs     07/30/20  1108 07/30/20  1656 07/30/20  2042 07/31/20  0716   POCGLU 349* 194* 252* 240*       Blood Sugar Average: Last 72 hrs:  · (P) 135 [id number] follows with [facility] regarding this  · For now continue U500 insulin 45 units b i d  Before breakfast/lunch  · Add SSI with Accu-Cheks and carb controlled diet  · Initiate hypoglycemia protocol                Resolved Problems  Date Reviewed: 7/28/2020    None          Admission Date:   Admission Orders (From admission, onward)     Ordered        07/28/20 0028  Inpatient Admission  Once                     Admitting Diagnosis: Dyspnea [R06 00]  SOB (shortness of breath) [R06 02]  Pleural effusion [J90]  Pulmonary nodule [R91 1]  COPD exacerbation (HCC) [J44 1]  MARANDA (acute kidney injury) ([facility] [address] ) [N17 9]      Procedures Performed:   Orders Placed This Encounter   Procedures    ED ECG Documentation Only       Summary of Hospital Course: This is a very pleasant 61-year-old male who presents hospital symptoms shortness of breath    Patient has a known history COPD CHF coronary artery disease with prior stenting x3 history of chronic hypoxic respiratory failure history type 2 diabetes and chronic kidney disease  The patient presents with increasing work of breathing shortness breath for last 2 3 days  He was admitted for further workup evaluation  He did have significant pleural effusion setting COPD  Pulmonary reports the fusion to be transudative in nature  There was concern for possible component of heart failure  By way further review on imaging the patient also had a 1 3 cm lung nodule  Patient is status post thoracentesis  Cardiology recommending increasing dose of Lasix with follow-up as outpatient  Pulmonary recommending outpatient workup with PET scan followed by EB OS versus IR biopsy depending on this status of PET scan  Will need close outpatient follow-up with Pulmonary in PCP  · Shortness of breath-likely multifactorial in nature  Summary component of COPD contributing the symptoms  Also mild CHF exacerbation is also considered as contributing factor  Patient did have a transudative process on pleural fluid  For now continue with diet modification and increased dose of Lasix as per Cardiology recommendations with outpatient follow-up  Patient has a follow-up with Pulmonary as an outpatient as well  Will need close follow through regarding nodule and also COPD management  Will continue with respiratory treatments as outlined by Pulmonary  · Lung nodule-outpatient follow-up with Pulmonary as above  PET scan as an outpatient  Follow-up regarding diagnostic studies including IR versus EBUS  · Probable obstructive sleep apnea  Patient declining treatment  · CKD stage 3  Baseline creatinine 1 4  · Severe COPD-patient follow-up with Pulmonary  · Diabetes-patient did receive 1 time dose of steroids in the ED  Continue neb treatments    Continue with U500 insulin        Condition at Discharge: fair         Discharge instructions/Information to patient and family:   See after visit summary for information provided to patient and family  Provisions for Follow-Up Care:  See after visit summary for information related to follow-up care and any pertinent home health orders  PCP: Rian Meraz MD    Disposition: Home    Planned Readmission: No    Discharge Statement   I spent 35 minutes discharging the patient  This time was spent on the day of discharge  I had direct contact with the patient on the day of discharge  Additional documentation is required if more than 30 minutes were spent on discharge  Discharge Medications:  See after visit summary for reconciled discharge medications provided to patient and family

## 2020-07-31 NOTE — ASSESSMENT & PLAN NOTE
· Patient presented with worsening shortness of breath shortly prior to arrival  · CTA PE study negative for pulmonary embolism however with moderate size right pleural effusion and new lung nodule noted  · Additionally COVID-19 nasal swab negative  · Consideration for possible COPD exacerbation and/or CHF exacerbation  Additionally pleural effusion may be playing role  · Pulmonary will schedule outpatient follow-up and also PET scan and then will plan further workup including IR biopsy and EBUS also PFTs as outpatient  Patient has suspected JACQUELINE/OHS but patient declines workup or CPAP if needed  · Note Pulmonary input regarding IR  Will need to reassess assess stability of nodule as outpatient    Will need to follow-up with pulmonary post PET scan to arrange further assessment regarding optimal tissue sampling

## 2020-07-31 NOTE — PLAN OF CARE
Problem: PAIN - ADULT  Goal: Verbalizes/displays adequate comfort level or baseline comfort level  Description  Interventions:  - Encourage patient to monitor pain and request assistance  - Assess pain using appropriate pain scale  - Administer analgesics based on type and severity of pain and evaluate response  - Implement non-pharmacological measures as appropriate and evaluate response  - Consider cultural and social influences on pain and pain management  - Notify physician/advanced practitioner if interventions unsuccessful or patient reports new pain  Outcome: Progressing     Problem: SAFETY ADULT  Goal: Patient will remain free of falls  Description  INTERVENTIONS:  - Assess patient frequently for physical needs  -  Identify cognitive and physical deficits and behaviors that affect risk of falls    -  [place] fall precautions as indicated by assessment   - Educate patient/family on patient safety including physical limitations  - Instruct patient to call for assistance with activity based on assessment  - Modify environment to reduce risk of injury  - Consider OT/PT consult to assist with strengthening/mobility  Outcome: Progressing  Goal: Maintain or return to baseline ADL function  Description  INTERVENTIONS:  -  Assess patient's ability to carry out ADLs; assess patient's baseline for ADL function and identify physical deficits which impact ability to perform ADLs (bathing, care of mouth/teeth, toileting, grooming, dressing, etc )  - Assess/evaluate cause of self-care deficits   - Assess range of motion  - Assess patient's mobility; develop plan if impaired  - Assess patient's need for assistive devices and provide as appropriate  - Encourage maximum independence but intervene and supervise when necessary  - Involve family in performance of ADLs  - Assess for home care needs following discharge   - Consider OT consult to assist with ADL evaluation and planning for discharge  - Provide patient education as appropriate  Outcome: Progressing  Goal: Maintain or return mobility status to optimal level  Description  INTERVENTIONS:  - Assess patient's baseline mobility status (ambulation, transfers, stairs, etc )    - Identify cognitive and physical deficits and behaviors that affect mobility  - Identify mobility aids required to assist with transfers and/or ambulation (gait belt, sit-to-stand, lift, walker, cane, etc )  - [place] fall precautions as indicated by assessment  - Record patient progress and toleration of activity level on Mobility SBAR; progress patient to next Phase/Stage  - Instruct patient to call for assistance with activity based on assessment  - Consider rehabilitation consult to assist with strengthening/weightbearing, etc   Outcome: Progressing     Problem: Knowledge Deficit  Goal: Patient/family/caregiver demonstrates understanding of disease process, treatment plan, medications, and discharge instructions  Description  Complete learning assessment and assess knowledge base  Interventions:  - Provide teaching at level of understanding  - Provide teaching via preferred learning methods  Outcome: Progressing     Problem: Potential for Falls  Goal: Patient will remain free of falls  Description  INTERVENTIONS:  - Assess patient frequently for physical needs  -  Identify cognitive and physical deficits and behaviors that affect risk of falls    -  [place] fall precautions as indicated by assessment   - Educate patient/family on patient safety including physical limitations  - Instruct patient to call for assistance with activity based on assessment  - Modify environment to reduce risk of injury  - Consider OT/PT consult to assist with strengthening/mobility  Outcome: Progressing     Problem: Prexisting or High Potential for Compromised Skin Integrity  Goal: Skin integrity is maintained or improved  Description  INTERVENTIONS:  - Identify patients at risk for skin breakdown  - Assess and monitor skin integrity  - Assess and monitor nutrition and hydration status  - Monitor labs   - Assess for incontinence   - Turn and reposition patient  - Assist with mobility/ambulation  - Relieve pressure over bony prominences  - Avoid friction and shearing  - Provide appropriate hygiene as needed including keeping skin clean and dry  - Evaluate need for skin moisturizer/barrier cream  - Collaborate with interdisciplinary team   - Patient/family teaching  - Consider wound care consult   Outcome: Progressing

## 2020-07-31 NOTE — PROGRESS NOTES
General Cardiology   Progress Note -  Team One   Stu Melendrez Sr  69 y o  male MRN: [id number]    Unit/Bed#: [facility] 928-01 Encounter: [id number]    Assessment/ Plan:    1  Pleural effusion: Right sided, partially loculated s/p thoracentesis 7/28 with 1L transudative fluid  Thought to be 2/2 to acute CHF; awaiting final cytology  S/p IV lasix; did diurese about 4L total; net - 212mL  Wt down to 261lbs which is his baseline  Back on oral diuretics today at higher dose lasix 60mg daily; doubt he would take BID dosing at home  Repeat echo showed preserved LV function EF 68%; no RWMA, Grade 1 DD; his RV was dilated with reduced systolic function  2  Chronic diastolic and right sided CHF: prescribed lasix 40mg PO BID as OP; only takes daily for past 6 months at least  See #1  He has RV dilation and reduced systolic function; should have OP sleep study as he likely has severe JACQUELINE but he has continued to decline workup for this  3  CAD: stenting to D1 and RCA prior to 2015; no interventions on cath in 2015; denies any typical anginal symptoms; he did have some chest pain in past weeks associated with his SOB but this sounds more pleuritic in nature  No further workup needed  Continue ASA, statin and beta blocker  4  Severe COPD: on 3L NC as OP; on this currently; pulmon morena following; not in acute exacerbation  5  Pulmonary nodule: primary team and pulm managing; will need PET scan     Subjective: pt seen for follow up; no events overnight  He reports feeling fine today; breathing a little bit better; has NP cough  No chest pain, palpitations  Review of Systems   Constitution: Negative for chills, decreased appetite and fever  Cardiovascular: Positive for dyspnea on exertion  Negative for chest pain, leg swelling, orthopnea, palpitations and syncope  Respiratory: Positive for cough  Negative for sleep disturbances due to breathing, sputum production and wheezing      Gastrointestinal: Negative for abdominal pain, nausea and vomiting  Genitourinary: Negative for dysuria  Neurological: Negative for dizziness and light-headedness  Psychiatric/Behavioral: Negative for altered mental status  All other systems reviewed and are negative  Objective:   Vitals: Blood pressure 126/68, pulse 86, temperature 98 6 °F (37 °C), resp  rate 16, height 6' (1 829 m), weight 118 kg (261 lb 0 4 oz), SpO2 94 %  ,     Body mass index is 35 4 kg/m²  ,     Systolic (31ZYF), RCV:993 , Min:126 , SXR:557     Diastolic (87SQP), UXC:10, Min:54, Max:81      Intake/Output Summary (Last 24 hours) at 7/31/2020 0912  Last data filed at 7/31/2020 1002  Gross per 24 hour   Intake 1120 ml   Output 2625 ml   Net -1505 ml     Weight (last 2 days)     Date/Time   Weight    07/31/20 0600   118 (261 02)    07/30/20 0600   119 (262 35)    07/29/20 0600   120 (265 43)            Telemetry Review:   No telemetry    Physical Exam   Constitutional: He is oriented to person, place, and time  No distress  HENT:   Head: Normocephalic and atraumatic  Cardiovascular: Normal rate, regular rhythm, S1 normal and S2 normal    No murmur heard  No LE edema   Pulmonary/Chest: Effort normal    BS decreased; no rales or wheezing; on 3L NC   Abdominal: Soft  obese   Musculoskeletal: He exhibits no edema or deformity  Neurological: He is alert and oriented to person, place, and time  Skin: Skin is warm and dry  He is not diaphoretic  Psychiatric: He has a normal mood and affect  His behavior is normal    Nursing note and vitals reviewed      LABORATORY RESULTS  Results from last 7 days   Lab Units 07/27/20  2043   TROPONIN I ng/mL <0 02     CBC with diff:   Results from last 7 days   Lab Units 07/30/20  0516 07/28/20  0543 07/27/20  2043   WBC Thousand/uL 10 58* 10 75* 16 28*   HEMOGLOBIN g/dL 10 5* 11 8* 13 5   HEMATOCRIT % 35 8* 39 7 45 1   MCV fL 86 86 85   PLATELETS Thousands/uL 147* 180 212   MCH pg 25 3* 25 5* 25 3*   MCHC g/dL 29 3* 29 7* 29 9*   RDW % 14 6 14 5 14 4   MPV fL 11 5 11 2 10 7   NRBC AUTO /100 WBCs 0  --  0     CMP:  Results from last 7 days   Lab Units 20  0538 20  0516 20  0543 20  2043   POTASSIUM mmol/L 3 8 4 1 5 3 4 6   CHLORIDE mmol/L 92* 95* 94* 96*   CO2 mmol/L 40* 39* 37* 40*   BUN mg/dL 32* 39* 27* 28*   CREATININE mg/dL 1 26 1 51* 1 41* 1 51*   CALCIUM mg/dL 8 5 8 2* 8 5 10 1   AST U/L  --   --   --  34   ALT U/L  --   --   --  28   ALK PHOS U/L  --   --   --  102   EGFR ml/min/1 73sq m 57 45 49 45     BMP:  Results from last 7 days   Lab Units 20  0538 20  0516 20  0543 20  2043   POTASSIUM mmol/L 3 8 4 1 5 3 4 6   CHLORIDE mmol/L 92* 95* 94* 96*   CO2 mmol/L 40* 39* 37* 40*   BUN mg/dL 32* 39* 27* 28*   CREATININE mg/dL 1 26 1 51* 1 41* 1 51*   CALCIUM mg/dL 8 5 8 2* 8 5 10 1     Lab Results   Component Value Date    NTBNP 302 (H) 2020    NTBNP 363 (H) 2019    NTBNP 356 (H) 2018     Lipid Profile:   Lab Results   Component Value Date    CHOL 152 09/10/2015    CHOL 164 2015     Lab Results   Component Value Date    HDL 40 03/10/2020    HDL 52 2019    HDL 50 2019     Lab Results   Component Value Date    LDLCALC 74 03/10/2020    LDLCALC 60 2019    LDLCALC 64 2019     Lab Results   Component Value Date    TRIG 216 (H) 03/10/2020    TRIG 184 (H) 2019    TRIG 122 2019     Cardiac testing:   Results for orders placed during the hospital encounter of 20   Echo complete with contrast if indicated    [address]  [facility], [address] [facility]  [phone]    Transthoracic Echocardiogram  2D, Doppler, and Color Doppler    Study date:  2020    Patient: Tavo Kapadia  MR number: [id number]  Account number: [de-identified]  : 1945  Age: 69 years  Gender: Male  Status: Inpatient  Location: Bedside  Height: 72 in  Weight: 265 lb  BP: 158/ 83 mmHg    Indications: Heart Failure    Diagnoses: I50 9 - Heart failure, unspecified    Sonographer:  JOLIE Barriga  Primary Physician:  Anu Paulson MD  Referring Physician: YOLANDE Ramirez  Group:  [facility]  Cardiology Fellow:  Governor Alice DO  Interpreting Physician:  Kirby Hoffman MD    SUMMARY    PROCEDURE INFORMATION:  This was a technically difficult study  Cardiac structures were not all visualized  Intravenous contrast (  4ml Definity in NSS) was administered to opacify the left ventricle  LEFT VENTRICLE:  Systolic function was normal  Ejection fraction was estimated to be 68 %  There were no regional wall motion abnormalities  Wall thickness was increased  Concentric hypertrophy was present  Doppler parameters were consistent with abnormal left ventricular relaxation (grade 1 diastolic dysfunction)  RIGHT VENTRICLE:  The ventricle was dilated  Systolic function was reduced  LEFT ATRIUM:  The atrium was dilated  HISTORY: PRIOR HISTORY: GERD,Pleural effusion,Obesity,DM2,CKD3,MI,COPD,SOB,CHF,CAD,CP,RBBB,JACQUELINE,HTN,HLD    PROCEDURE: The procedure was performed at the bedside  This was a routine study  The transthoracic approach was used  The study included complete 2D imaging, complete spectral Doppler, and color Doppler  The heart rate was 92 bpm, at the  start of the study  Intravenous contrast (  4ml Definity in NSS) was administered to opacify the left ventricle  Echocardiographic views were limited due to poor acoustic window availability, decreased penetration, and lung interference  No M-mode measurements were possible  This was a technically difficult study  Cardiac structures were not all visualized  LEFT VENTRICLE: Size was normal  Systolic function was normal  Ejection fraction was estimated to be 68 %  There were no regional wall motion abnormalities  Wall thickness was increased  Concentric hypertrophy was present   DOPPLER: Doppler  parameters were consistent with abnormal left ventricular relaxation (grade 1 diastolic dysfunction)  RIGHT VENTRICLE: The ventricle was dilated  Systolic function was reduced  LEFT ATRIUM: The atrium was dilated  MITRAL VALVE: Valve structure was normal  There was normal leaflet separation  DOPPLER: The transmitral velocity was within the normal range  There was no evidence for stenosis  There was no regurgitation  AORTIC VALVE: The valve was trileaflet  Leaflets exhibited normal cuspal separation and sclerosis  DOPPLER: Transaortic velocity was within the normal range  There was no evidence for stenosis  There was no regurgitation  PERICARDIUM: There was no pericardial effusion  The pericardium was normal in appearance  AORTA: The root exhibited normal size      SYSTEM MEASUREMENT TABLES    PW  E': 0 08 m/s  E/E': 9 45  MV A Jose: 1 04 m/s  MV Dec [place]: 4 07 m/s2  MV DecT: 195 86 ms  MV E Jose: 0 8 m/s  MV E/A Ratio: 0 76  MV PHT: 56 8 ms  MVA By PHT: 3 87 cm2    [facility]    Prepared and electronically signed by    Mani Burnett MD  Signed 30-Jul-2020 13:42:14       Meds/Allergies   all current active meds have been reviewed  Medications Prior to Admission   Medication    aspirin (ECOTRIN LOW STRENGTH) 81 mg EC tablet    cyclobenzaprine (FLEXERIL) 10 mg tablet    furosemide (LASIX) 40 mg tablet    metoprolol tartrate (LOPRESSOR) 25 mg tablet    Omega-3 Fatty Acids (FISH OIL) 645 MG CAPS    ONE TOUCH ULTRA TEST test strip    oxyCODONE-acetaminophen (PERCOCET) 7 5-325 MG per tablet    potassium chloride (K-DUR,KLOR-CON) 20 mEq tablet    simvastatin (ZOCOR) 40 mg tablet    tamsulosin (FLOMAX) 0 4 mg    TRELEGY ELLIPTA 100-62 5-25 MCG/INH inhaler    ferrous sulfate 325 (65 Fe) mg tablet    fluticasone (FLONASE) 50 mcg/act nasal spray    Insulin Syringe/Needle U-500 (BD Insulin Syringe U-500) 31G X 6MM 0 5 ML MISC    levalbuterol (XOPENEX) 1 25 mg/3 mL nebulizer solution    pantoprazole (PROTONIX) 40 mg tablet     Assessment:  Principal Problem:    Shortness of breath  Active Problems:    Type 2 diabetes mellitus with hyperglycemia, with long-term current use of insulin (HCC)    COPD, severe (HCC)    Chronic kidney disease, stage 3 (HCC)    Pleural effusion on right    Chronic diastolic heart failure (HCC)    Chronic respiratory failure with hypoxia and hypercapnia (HCC)    Lung nodule    Counseling / Coordination of Care  Total floor / unit time spent today 20 minutes  Greater than 50% of total time was spent with the patient and / or family counseling and / or coordination of care  ** Please Note: Dragon 360 Dictation voice to text software may have been used in the creation of this document   **

## 2020-08-03 NOTE — UTILIZATION REVIEW
Notification of Discharge  This is a Notification of Discharge from our facility [address]  Please be advised that this patient has been discharge from our facility  Below you will find the admission and discharge date and time including the patients disposition  PRESENTATION DATE: 7/27/2020  8:11 PM  OBS ADMISSION DATE:   IP ADMISSION DATE: 7/28/20 0028   DISCHARGE DATE: 7/31/2020  4:12 PM  DISPOSITION[facility]   Admission Orders listed below:  Admission Orders (From admission, onward)     Ordered        07/28/20 0028  Inpatient Admission  Once                   Please contact the UR Department if additional information is required to close this patient's authorization/case  [address] Utilization Review Department  Main: [phone] x carefully listen to the prompts  All voicemails are confidential   [email or web address]  Send all requests for admission clinical reviews, approved or denied determinations and any other requests to dedicated fax number below belonging to the campus where the patient is receiving treatment   List of dedicated fax numbers:  [address] DENIALS (Administrative/Medical Necessity) [phone]   [address] (Maternity/NICU/Pediatrics) [phone]   Radha Flatten [phone]   Tushar Watkins [phone]   Irais Jones [phone]   [address] [phone]   [facility]  [phone]   [address] [facility] [address]  [address] [facility] [place] [facility] [place] [address] [facility] [phone]

## 2020-08-10 ENCOUNTER — TELEPHONE (OUTPATIENT)
Dept: PULMONOLOGY | Facility: CLINIC | Age: 75
End: 2020-08-10

## 2020-08-10 NOTE — TELEPHONE ENCOUNTER
Marly Glover from [facility] PET/CT calling saying she would like us to know that she has tried to reach out to Lugenia Navasota all day Friday 8/7 about his PET scan on 8/10 but he has not called back  She also tried reaching out to his son and did not get an answer  He did not show up to his PET scan today  She wanted us to be aware

## 2020-08-17 ENCOUNTER — OFFICE VISIT (OUTPATIENT)
Dept: CARDIOLOGY CLINIC | Facility: CLINIC | Age: 75
End: 2020-08-17
Payer: COMMERCIAL

## 2020-08-17 VITALS
HEIGHT: 72 IN | BODY MASS INDEX: 35.35 KG/M2 | TEMPERATURE: 97.8 F | OXYGEN SATURATION: 76 % | HEART RATE: 96 BPM | WEIGHT: 261 LBS | DIASTOLIC BLOOD PRESSURE: 60 MMHG | SYSTOLIC BLOOD PRESSURE: 150 MMHG

## 2020-08-17 DIAGNOSIS — R91.1 PULMONARY NODULE: ICD-10-CM

## 2020-08-17 DIAGNOSIS — J90 PLEURAL EFFUSION: ICD-10-CM

## 2020-08-17 DIAGNOSIS — Z79.4 TYPE 2 DIABETES MELLITUS WITH HYPERGLYCEMIA, WITH LONG-TERM CURRENT USE OF INSULIN (HCC): Chronic | ICD-10-CM

## 2020-08-17 DIAGNOSIS — J44.9 COPD, SEVERE (HCC): ICD-10-CM

## 2020-08-17 DIAGNOSIS — J96.11 CHRONIC RESPIRATORY FAILURE WITH HYPOXIA AND HYPERCAPNIA (HCC): ICD-10-CM

## 2020-08-17 DIAGNOSIS — J96.12 CHRONIC RESPIRATORY FAILURE WITH HYPOXIA AND HYPERCAPNIA (HCC): ICD-10-CM

## 2020-08-17 DIAGNOSIS — N18.30 CHRONIC KIDNEY DISEASE, STAGE 3 (HCC): Chronic | ICD-10-CM

## 2020-08-17 DIAGNOSIS — E11.65 TYPE 2 DIABETES MELLITUS WITH HYPERGLYCEMIA, WITH LONG-TERM CURRENT USE OF INSULIN (HCC): Chronic | ICD-10-CM

## 2020-08-17 DIAGNOSIS — E78.5 DYSLIPIDEMIA: ICD-10-CM

## 2020-08-17 DIAGNOSIS — I50.32 CHRONIC DIASTOLIC HEART FAILURE (HCC): Primary | ICD-10-CM

## 2020-08-17 PROCEDURE — 99214 OFFICE O/P EST MOD 30 MIN: CPT | Performed by: NURSE PRACTITIONER

## 2020-08-17 RX ORDER — KETOCONAZOLE 20 MG/G
CREAM TOPICAL
COMMUNITY
End: 2022-01-30 | Stop reason: HOSPADM

## 2020-08-17 RX ORDER — TRAMADOL HYDROCHLORIDE 50 MG/1
TABLET ORAL
COMMUNITY
End: 2022-01-30 | Stop reason: HOSPADM

## 2020-08-17 NOTE — PROGRESS NOTES
Cardiology Follow Up    Norma Mcginnis Sr   1945  [id number]  [facility] [place] [address]  [place] [id number] [phone]      Chronic combined heart failure    Interval History:  Mr Charlotta Cockayne was admitted to [facility] on 7/27 - 7/31/20 with shortness of breath  He presented to the ED with a 2-3 day history of shortness of breath  CTA was negative for PE   COVID - 19 negative  7/28/20 CXR showed right pleural effusion with overlying atelectasis  He underwent a right pleural effusion, Partially loculated,  with 1 L removed  Fluid was transudative  7/30/20 TTE showed   Left ventricular systolic function was normal, LVEF 60%  No regional wall motion abnormality  Wall thickness was increased  Concentric hypertrophy present  Grade 1 diastolic dysfunction  Right ventricle was dilated systolic function was reduced  Left atrium was dilated  He was diuresed with IV Lasix total 4 liters  Discharge dose of Lasix was increased to 60 mg daily  Pulmonary recommended outpatient workup with a PET scan  Shortness of breath was felt to be multifactorial component COPD and CHF  Amy Holloway was instructed continual  oxygen use 3 L nasal cannula keep oxygen saturation 88-94%  Discharge lab studies sodium 135 potassium 3 8 BUN 32 creatinine 1 26  Mr Pascual Lópezs our office for recent hospitalization follow-up visit  He is accompanied by his son  Oxygen a shin or room air 78%  He was placed on 3 L nasal cannula and increased to 4 L nasal cannula with oxygen saturation improved to 98%  He denies worsening dyspnea from baseline  He admits to continued chest pain with respirations which appears to be more pleuritic  Amy Holloway complete being of lightheadedness and dizziness with bending over and bending his neck back  NOEMY has not come to his home     According to his son his phone message is not working  [employer] low-sodium meals on wheels  He is snacking on donuts, Pizza and chips he is not weighing himself at home  His weight in the office is 261 lb  HPI:  Chronic 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)    COPD, severe (HCC)    Chronic kidney disease, stage 3 (HCC)    Shortness of breath    Pleural effusion on right    Chronic diastolic heart failure (HCC)    Poor dentition    Tremors of nervous system    Benign hypertension with CKD (chronic kidney disease) stage III (HCC)    Diabetic neuropathy (HCC)    Insulin dependent diabetes mellitus     Acute on chronic diastolic (congestive) heart failure    Elevated d-dimer    CAD (coronary artery disease)    Urinary frequency    Hyponatremia    RBBB    Oxygen dependent    Persistent proteinuria    COPD with acute exacerbation (HCC)    Chronic respiratory failure with hypoxia and hypercapnia (HCC)    Hyperkalemia    Chest pain    Pulmonary hypertension (HCC)    JACQUELINE (obstructive sleep apnea)    Lung nodule     Past Medical History:   Diagnosis Date    Abdominal pain     Cardiac disease     CHF (congestive heart failure) (HCC)     COPD (chronic obstructive pulmonary disease) (HCC)     Coronary artery disease     Diabetes mellitus (HCC)     GERD (gastroesophageal reflux disease)     Hyperlipidemia     Hypertension     MI (myocardial infarction) ([facility] )     with 3 stents    Prostate cancer ([facility] )      Social History     Socioeconomic History    Marital status: /Civil Union     Spouse name: Not on file    Number of children: 1    Years of education: 8    Highest education level: Not on file   Occupational History    Not on file   Social Needs    Financial resource strain: Not on file    Food insecurity     Worry: Not on file     Inability: Not on file   [employer] needs     Medical: Not on file     Non-medical: Not on file   Tobacco Use    Smoking status: Current Some Day Smoker     Packs/day: 1 50     Years: 50 00     Pack years: 75 00     Last attempt to quit: 2017     Years since quittin 9    Smokeless tobacco: Never Used    Tobacco comment: 1-2 cigs daily   Substance and Sexual Activity    Alcohol use: Not Currently     Frequency: Never     Binge frequency: Never    Drug use: No    Sexual activity: Never     Partners: Female   Lifestyle    Physical activity     Days per week: Not on file     Minutes per session: Not on file    Stress: Not on file   Relationships    Social connections     Talks on phone: Not on file     Gets together: Not on file     Attends Confucianism service: Not on file     Active member of club or organization: Not on file     Attends meetings of clubs or organizations: Not on file     Relationship status: Not on file    Intimate partner violence     Fear of current or ex partner: Not on file     Emotionally abused: Not on file     Physically abused: Not on file     Forced sexual activity: Not on file   Other Topics Concern    Not on file   Social History Narrative    Most recent tobacco use screenin2018    Do you currently or have you served in [employer]: No    Were you activated, into active duty, as a member of the [email or web address] and [employer] or as a Reservist: No    Caffeine intake: Moderate    Alcohol intake: None    Marital status:      Number of children: 1 Education: 8    Occupation: retired    Sexual orientation: Heterosexual    General stress level: [employer] alone or with others: with others    Exercise level: None    Sexually active: No    Overweight: Yes    Obese: Yes    Guns present in home: No    Seat belts used routinely: Yes    Advance directive: No    Sunscreen used routinely: No    Smoke alarm in home: Yes    Legally blind in one or both eyes: No    Hard of hearing or deaf in one or both ears: No      Family History   Problem Relation Age of Onset    Heart disease Father     Other Father         Mesothelioma      Past Surgical History:   Procedure Laterality Date    ABDOMINAL SURGERY      exploratory    ANGIOPLASTY      3 stents    APPENDECTOMY      COLONOSCOPY  2015    ESOPHAGOGASTRODUODENOSCOPY N/A 10/2/2017    Procedure: ESOPHAGOGASTRODUODENOSCOPY (EGD); Surgeon: John Dupree MD;  Location: [facility];   Service: Gastroenterology    KNEE CARTILAGE SURGERY      OTHER SURGICAL HISTORY      stent placement    PROSTATE SURGERY      SKIN GRAFT      Basal cell CA back       Current Outpatient Medications:     aspirin (ECOTRIN LOW STRENGTH) 81 mg EC tablet, Take 1 tablet by mouth daily, Disp: , Rfl:     budesonide (PULMICORT) 0 5 mg/2 mL nebulizer solution, Take 1 vial (0 5 mg total) by nebulization every 12 (twelve) hours Rinse mouth after use , Disp: 1 vial, Rfl: 0    cyclobenzaprine (FLEXERIL) 10 mg tablet, TAKE ONE TABLET BY MOUTH THREE TIMES A DAY, Disp: 60 tablet, Rfl: 2    ferrous sulfate 325 (65 Fe) mg tablet, Take 325 mg by mouth daily with breakfast, Disp: , Rfl:     fluticasone (FLONASE) 50 mcg/act nasal spray, 1 spray into each nostril daily, Disp: 16 g, Rfl: 0    furosemide (LASIX) 20 mg tablet, Take 3 tablets (60 mg total) by mouth daily, Disp: 90 tablet, Rfl: 0    guaiFENesin (MUCINEX) 600 mg 12 hr tablet, Take 1 tablet (600 mg total) by mouth every 12 (twelve) hours, Disp: 30 tablet, Rfl: 0    insulin regular (HumuLIN R U-500) 500 units/mL CONCENTRATED injection, Inject 0 09 mL (45 Units total) under the skin 2 (two) times a day before breakfast and lunch, Disp: 30 mL, Rfl: 3    Insulin Syringe/Needle U-500 (BD Insulin Syringe U-500) 31G X 6MM 0 5 ML MISC, Use 1 syringe three times a day for injecting insulin Pt needs an appt, Disp: 100 each, Rfl: 0    levalbuterol (XOPENEX) 1 25 mg/3 mL nebulizer solution, USE 1 VIAL IN NEBULIZER THREE TIMES A DAY, Disp: , Rfl: 5    metoprolol tartrate (LOPRESSOR) 25 mg tablet, TAKE ONE TABLET BY MOUTH TWICE A DAY, Disp: 60 tablet, Rfl: 5    Omega-3 Fatty Acids (FISH OIL) 645 MG CAPS, Take 1 capsule by mouth 2 (two) times a day, Disp: , Rfl:     ONE TOUCH ULTRA TEST test strip, TEST FOUR TIMES A DAY, Disp: 150 each, Rfl: 5    oxyCODONE-acetaminophen (PERCOCET) 7 5-325 MG per tablet, Take by mouth every 4 (four) hours, Disp: , Rfl:     pantoprazole (PROTONIX) 40 mg tablet, TAKE 1 TABLET BY MOUTH EVERY DAY, Disp: 60 tablet, Rfl: 2    potassium chloride (K-DUR,KLOR-CON) 20 mEq tablet, TAKE 1 TABLET BY MOUTH EVERY DAY, Disp: 60 tablet, Rfl: 2    simvastatin (ZOCOR) 40 mg tablet, TAKE 1 TABLET BY MOUTH EVERY DAY, Disp: 60 tablet, Rfl: 2    tamsulosin (FLOMAX) 0 4 mg, TAKE 1 CAPSULE BY MOUTH EVERY DAY, Disp: 60 capsule, Rfl: 2    TRELEGY ELLIPTA 100-62 5-25 MCG/INH inhaler, INHALE 1 PUFF EVERY DAY, Disp: 60 each, Rfl: 3  Allergies   Allergen Reactions    Metformin GI Intolerance       Labs:  Admission on 07/27/2020, Discharged on 07/31/2020   Component Date Value    WBC 07/27/2020 16 28*    RBC 07/27/2020 5 34     Hemoglobin 07/27/2020 13 5     Hematocrit 07/27/2020 45 1     MCV 07/27/2020 85     MCH 07/27/2020 25 3*    MCHC 07/27/2020 29 9*    RDW 07/27/2020 14 4     MPV 07/27/2020 10 7     Platelets 78/52/4379 212     nRBC 07/27/2020 0     Neutrophils Relative 07/27/2020 89*    Immat GRANS % 07/27/2020 1     Lymphocytes Relative 07/27/2020 6*    Monocytes Relative 07/27/2020 4     Eosinophils Relative 07/27/2020 0     Basophils Relative 07/27/2020 0     Neutrophils Absolute 07/27/2020 14 41*    Immature Grans Absolute 07/27/2020 0 13     Lymphocytes Absolute 07/27/2020 0 97     Monocytes Absolute 07/27/2020 0 71     Eosinophils Absolute 07/27/2020 0 03     Basophils Absolute 07/27/2020 0 03     Sodium 07/27/2020 138     Potassium 07/27/2020 4 6     Chloride 07/27/2020 96*    CO2 07/27/2020 40*    ANION GAP 07/27/2020 2*    BUN 07/27/2020 28*    Creatinine 07/27/2020 1 51*    Glucose 07/27/2020 161*    Calcium 07/27/2020 10 1     AST 07/27/2020 34     ALT 07/27/2020 28     Alkaline Phosphatase 07/27/2020 102     Total Protein 07/27/2020 9 2*    Albumin 07/27/2020 3 7     Total Bilirubin 07/27/2020 0 24     eGFR 07/27/2020 45     Troponin I 07/27/2020 <0 02     SARS-CoV-2 07/28/2020 Negative     Procalcitonin 07/28/2020 0 18     Sodium 07/28/2020 137     Potassium 07/28/2020 5 3     Chloride 07/28/2020 94*    CO2 07/28/2020 37*    ANION GAP 07/28/2020 6     BUN 07/28/2020 27*    Creatinine 07/28/2020 1 41*    Glucose 07/28/2020 375*    Calcium 07/28/2020 8 5     eGFR 07/28/2020 49     WBC 07/28/2020 10 75*    RBC 07/28/2020 4 63     Hemoglobin 07/28/2020 11 8*    Hematocrit 07/28/2020 39 7     MCV 07/28/2020 86     MCH 07/28/2020 25 5*    MCHC 07/28/2020 29 7*    RDW 07/28/2020 14 5     Platelets 39/58/3564 180     MPV 07/28/2020 11 2     Ventricular Rate 07/27/2020 130     Atrial Rate 07/27/2020 138     QRSD Interval 07/27/2020 124     QT Interval 07/27/2020 340     QTC Interval 07/27/2020 500     QRS Axis 07/27/2020 116     T Wave Axis 07/27/2020 47     NT-proBNP 07/27/2020 302*    POC Glucose 07/28/2020 446*    POC Glucose 07/28/2020 359*    WBC, Fluid 07/28/2020 2,031     Body Fluid Culture, Ster* 07/28/2020 No growth     Gram Stain Result 07/28/2020 No Polys or Bacteria seen     Glucose, Fluid 07/28/2020 330     LD, Fluid 07/28/2020 83     Case Report 07/28/2020                      Value:Non-gynecologic Cytology                          Case: [id number]                                  Authorizing Provider:  Yasir Lynch DO            Collected:           07/28/2020 1427              Ordering Location:     [address] [place] [address] Road      Received:            07/28/2020 [address]                                                              Pathologist:           Chhaya Neely MD                                                         Specimens:   A) - Pleural, Right                                                                                 B) - Pleural, Right, cell block                                                            Final Diagnosis 07/28/2020                      Value: This result contains rich text formatting which cannot be displayed here   Note 07/28/2020                      Value: This result contains rich text formatting which cannot be displayed here  [facility] Description 07/28/2020                      Value: This result contains rich text formatting which cannot be displayed here   Additional Information 07/28/2020                      Value: This result contains rich text formatting which cannot be displayed here      Protein, Fluid 07/28/2020 4 3     PH BODY FLUID 07/28/2020 7 5     RBC, Fluid 07/28/2020 8,000     Total Counted 07/28/2020 100     Lymphs % (Fluid) 07/28/2020 92     Histiocyte % (Fluid) 07/28/2020 3     Monocytes % (Fluid) 07/28/2020 5     POC Glucose 07/28/2020 197*    POC Glucose 07/28/2020 272*    POC Glucose 07/29/2020 301*    POC Glucose 07/29/2020 352*    POC Glucose 07/29/2020 195*    POC Glucose 07/29/2020 209*    Sodium 07/30/2020 135*    Potassium 07/30/2020 4 1     Chloride 07/30/2020 95*    CO2 07/30/2020 39*    ANION GAP 07/30/2020 1*    BUN 07/30/2020 39*    Creatinine 07/30/2020 1 51*    Glucose 07/30/2020 258*    Calcium 07/30/2020 8 2*    eGFR 07/30/2020 45     WBC 07/30/2020 10 58*    RBC 07/30/2020 4 15     Hemoglobin 07/30/2020 10 5*    Hematocrit 07/30/2020 35 8*    MCV 07/30/2020 86     MCH 07/30/2020 25 3*    MCHC 07/30/2020 29 3*    RDW 07/30/2020 14 6     MPV 07/30/2020 11 5     Platelets 61/90/7786 147*    nRBC 07/30/2020 0     Neutrophils Relative 07/30/2020 77*    Immat GRANS % 07/30/2020 1     Lymphocytes Relative 07/30/2020 12*    Monocytes Relative 07/30/2020 9     Eosinophils Relative 07/30/2020 1     Basophils Relative 07/30/2020 0     Neutrophils Absolute 07/30/2020 8 21*    Immature Grans Absolute 07/30/2020 0 06     Lymphocytes Absolute 07/30/2020 1 27     Monocytes Absolute 07/30/2020 0 94     Eosinophils Absolute 07/30/2020 0 07     Basophils Absolute 07/30/2020 0 03     POC Glucose 07/30/2020 256*    LD 07/30/2020 126     Total Protein 07/30/2020 6 6     POC Glucose 07/30/2020 349*    POC Glucose 07/30/2020 194*    POC Glucose 07/30/2020 252*    Sodium 07/31/2020 135*    Potassium 07/31/2020 3 8     Chloride 07/31/2020 92*    CO2 07/31/2020 40*    ANION GAP 07/31/2020 3*    BUN 07/31/2020 32*    Creatinine 07/31/2020 1 26     Glucose 07/31/2020 217*    Calcium 07/31/2020 8 5     eGFR 07/31/2020 57     POC Glucose 07/31/2020 240*    POC Glucose 07/31/2020 362*     Imaging: Xr Chest Portable    Result Date: 7/28/2020  Narrative: CHEST INDICATION:   post right-sided thoracentesis  COMPARISON:  7/27/2020 EXAM PERFORMED/VIEWS:  XR CHEST PORTABLE FINDINGS: Heart shadow is enlarged but unchanged from prior exam  There is a stable right pleural effusion with volume loss and right basilar atelectasis  There is no pneumothorax  Osseous structures appear within normal limits for patient age  Impression: No pneumothorax  Stable right pleural effusion with overlying atelectasis   Workstation performed: [id number]     Xr Chest 1 View Portable    Result Date: 7/28/2020  Narrative: CHEST INDICATION:   SOB  COMPARISON:  06/14/2019  Subsequently obtained CT of the chest  EXAM PERFORMED/VIEWS:  XR CHEST PORTABLE Images: 2 FINDINGS: Cardiomediastinal silhouette appears unremarkable  Pulmonary vessels are normal  Faint right upper lobe nodule correlating with the CT finding  Emphysematous changes  Moderate right pleural effusion  Cannot exclude infiltrate or atelectasis at the right lung base  Osseous structures appear within normal limits for patient age  Impression: Right upper lobe nodule correlating with the CT findings  Moderate right pleural effusion  Workstation performed: [id number]     Cta Ed Chest Pe Study    Result Date: 7/27/2020  Narrative: CTA - CHEST WITH IV CONTRAST - PULMONARY ANGIOGRAM INDICATION:   Dyspnea, chronic  COMPARISON: June 19, 2018 TECHNIQUE: CTA examination of the chest was performed using angiographic technique according to a protocol specifically tailored to evaluate for pulmonary embolism  Axial, sagittal, and coronal 2D reformatted images were created from the source data and  submitted for interpretation  In addition, coronal 3D MIP postprocessing was performed on the acquisition scanner  Radiation dose length product (DLP) for this visit:  1952 27 mGy-cm   This examination, like all CT scans performed in the [facility], was performed utilizing techniques to minimize radiation dose exposure, including the use of iterative reconstruction and automated exposure control  IV Contrast:  85 mL of iohexol (OMNIPAQUE)  FINDINGS: PULMONARY ARTERIAL TREE:  No pulmonary embolus is seen  LUNGS: There is a 1 3 cm right lung apex pulmonary nodule (series 4 image 25)  Emphysematous changes within the lungs are visualized  Consolidation in the right lower lobe is seen   Based on current Fleischner Society 2017 Guidelines on incidental pulmonary nodule, either PET/CT scan evaluation, tissue sampling or short term interval followup non-contrast CT followup (initailly in 3 months) may be considered appropriate  PLEURA:  Moderate right-sided pleural effusion is seen  HEART/GREAT VESSELS:  The heart is enlarged  Coronary artery calcifications are visualized  MEDIASTINUM AND JEREMÍAS:  Unremarkable  CHEST WALL AND LOWER NECK:   Unremarkable  VISUALIZED STRUCTURES IN THE UPPER ABDOMEN nodular hyperplasia of the adrenal glands is seen  Small hiatal hernia is visualized  OSSEOUS STRUCTURES:  No acute fracture or destructive osseous lesion  Impression: No evidence of pulmonary embolus Large pulmonary nodule in the right lung apex  Based on current Fleischner Society 2017 Guidelines on incidental pulmonary nodule, either PET/CT scan evaluation, tissue sampling or short term interval followup non-contrast CT followup (initailly in 3 months) may be considered appropriate  The study was marked in [facility] for immediate notification  Workstation performed: [id number]       Review of Systems:  Review of Systems   Constitutional: Positive for fatigue  Respiratory: Positive for shortness of breath  Musculoskeletal: Positive for arthralgias and gait problem  Neurological: Positive for dizziness and light-headedness  Physical Exam:  Physical Exam  Vitals signs reviewed  Constitutional:       Appearance: He is obese  HENT:      Head: Normocephalic  Nose: Nose normal       Mouth/Throat:      Mouth: Mucous membranes are moist    Eyes:      Pupils: Pupils are equal, round, and reactive to light  Neck:      Musculoskeletal: Normal range of motion  Cardiovascular:      Rate and Rhythm: Normal rate and regular rhythm  Pulses: Normal pulses  Heart sounds: Normal heart sounds  Pulmonary:      Comments: Dyspnea with minimal exertion  Diminished breath sounds throughout   Abdominal:      General: Bowel sounds are normal    Musculoskeletal:         General: No swelling  Skin:     General: Skin is warm and dry        Capillary Refill: Capillary refill takes less than 2 seconds  Comments: nicolette LE venous stasis changes   Neurological:      Mental Status: He is alert and oriented to person, place, and time  Psychiatric:         Mood and Affect: Mood normal          Discussion/Summary:  1  Chronic RV systolic heart failure, LV diastolic heart failure NYHA class III to IV stage C- weight stabhle since discharge 261 pounds  On PE appears eu volemic and compensated, HR and BP controlled, RUE /54, Continue metoprolol tartrate 25 mg q 12 hours, Lasix 60 mg daily, K-Dur 20 mEq daily  Maintain a 2 gram daily sodium diet and 1500 ml daily fluid restriction  Check daily weights or EOD weights  If you gained 3 pounds in one day, 5 pounds in one week, or experience worsening shortness of breath or increasing lower leg swelling  Please call the heart failure office at [phone]  Please bring a  list of your current medications and daily weights to the office visit  BMP and mag this week  2  Right pleural effusion sp right pleural thoracentesis 1 liter removed, partially loculated, follow up with Pulmonary this week   3  Right Pulmonary nodule 1 3cm follow up with Pulmonary this week  4  Dyslipidemia 3/10/20 , , HDL 40, LDL 74- continue on Zocor 40mg daily and Fish oil 645mg BID    5  Severe COPD   6  Chronic hypoxic respiratory failure using oxygen 3 LNC- instructed to use oxygen 100% of time  7  CKD III baseline creat 1 2 - 1 4- BMP this week   8  DM2 HgbA1C 9 5 on 3/10/20  9   CAD, 2015  hx of stent to D1, mid RCA and distal RCA- continue on ASA 81mg daily,  Metoprolol tartrate 25 mg q 12 hours, Zocor 40 mg daily, fish oil 645 mg b i d   10  Untreated sleep apnea, refusing treatment

## 2020-08-17 NOTE — PATIENT INSTRUCTIONS
Maintain a 2 gram daily sodium diet and 1500 ml daily fluid restriction  Check daily weights  If you gained 3 pounds in one day, 5 pounds in one week, or experience worsening shortness of breath or increasing lower leg swelling  Please call the heart failure office at [phone]    Please bring a  list of your current medications and daily weights to the office visit    CIRILO and mag this week

## 2020-08-31 NOTE — TELEPHONE ENCOUNTER
Called pt advised him I set him up with [facility] for  for tomorrow's PET scan at 1015   The pt agreed to this

## 2020-08-31 NOTE — TELEPHONE ENCOUNTER
Pedro Turner from PET scan calling saying patient has rescheduled twice now due to no transportation  He now rescheduled again for tomorrow's appt  They are wondering if we can help set up transport for him   Pedro Turner would like a call back at [phone]

## 2020-09-01 PROBLEM — R06.02 SHORTNESS OF BREATH: Status: RESOLVED | Noted: 2017-10-31 | Resolved: 2020-09-01

## 2020-09-01 PROBLEM — J96.12 CHRONIC RESPIRATORY FAILURE WITH HYPOXIA AND HYPERCAPNIA (HCC): Status: RESOLVED | Noted: 2019-06-14 | Resolved: 2020-09-01

## 2020-09-01 PROBLEM — E87.5 HYPERKALEMIA: Status: RESOLVED | Noted: 2019-06-14 | Resolved: 2020-09-01

## 2020-09-01 PROBLEM — R79.89 ELEVATED D-DIMER: Status: RESOLVED | Noted: 2018-05-23 | Resolved: 2020-09-01

## 2020-09-01 PROBLEM — R07.9 CHEST PAIN: Status: RESOLVED | Noted: 2019-06-14 | Resolved: 2020-09-01

## 2020-09-01 PROBLEM — M51.36 DDD (DEGENERATIVE DISC DISEASE), LUMBAR: Status: ACTIVE | Noted: 2020-09-01

## 2020-09-01 PROBLEM — I50.33 ACUTE ON CHRONIC DIASTOLIC (CONGESTIVE) HEART FAILURE (HCC): Status: RESOLVED | Noted: 2018-05-23 | Resolved: 2020-09-01

## 2020-09-01 PROBLEM — IMO0001 INSULIN DEPENDENT DIABETES MELLITUS: Status: RESOLVED | Noted: 2018-05-23 | Resolved: 2020-09-01

## 2020-09-01 PROBLEM — D64.9 ANEMIA: Status: ACTIVE | Noted: 2020-09-01

## 2020-09-01 PROBLEM — J96.11 CHRONIC RESPIRATORY FAILURE WITH HYPOXIA AND HYPERCAPNIA (HCC): Status: RESOLVED | Noted: 2019-06-14 | Resolved: 2020-09-01

## 2020-09-01 NOTE — TELEPHONE ENCOUNTER
Shelbi Mobley from PET/CT calling saying patient had a light breakfast this morning with his insulin  He checked his blood sugar at 10:15am and it was over 500  He said he is not sure that [facility] Cor[facility] understands what exactly he needs to do but they will not get an accurate scan  Blood sugar needs to be under 200 with his diabetic meds  Shelbi Mobley asked if Dr Manjinder Jensen would want to either talk to [facility], get his numbers under control and re-do the scan or set him up with another type of test  Please advise

## 2020-09-07 PROBLEM — R80.1 PERSISTENT PROTEINURIA: Status: RESOLVED | Noted: 2018-12-06 | Resolved: 2020-09-07

## 2020-09-07 PROBLEM — R25.1 TREMORS OF NERVOUS SYSTEM: Status: RESOLVED | Noted: 2018-02-11 | Resolved: 2020-09-07

## 2020-09-07 PROBLEM — R35.0 URINARY FREQUENCY: Status: RESOLVED | Noted: 2018-05-23 | Resolved: 2020-09-07

## 2020-09-07 PROBLEM — K08.9 POOR DENTITION: Status: RESOLVED | Noted: 2018-02-03 | Resolved: 2020-09-07

## 2020-09-24 ENCOUNTER — DOCUMENTATION (OUTPATIENT)
Dept: PULMONOLOGY | Facility: CLINIC | Age: 75
End: 2020-09-24

## 2020-09-24 NOTE — PROGRESS NOTES
COVID Pre-Visit Screening     1  Is this a family member screening? NO  2  Have you traveled outside of your state in the past 2 weeks? No  3  Do you presently have a fever or flu-like symptoms? NO  4  Do you have symptoms of an upper respiratory infection like runny nose, sore throat, or cough? NO  5  Are you suffering from new headache that you have not had in the past?  NO  6  Do you have/have you experienced any new shortness of breath recently? {NO  7  Do you have any new diarrhea, nausea or vomiting? NO  8  Have you been in contact with anyone who has been sick or diagnosed with COVID-19? NO  9  Do you have any new loss of taste or smell? NO  10  Are you able to wear a mask without a valve for the entire visit?  {YES

## 2020-09-25 ENCOUNTER — OFFICE VISIT (OUTPATIENT)
Dept: PULMONOLOGY | Facility: CLINIC | Age: 75
End: 2020-09-25
Payer: COMMERCIAL

## 2020-09-25 VITALS
WEIGHT: 263 LBS | OXYGEN SATURATION: 98 % | HEIGHT: 72 IN | BODY MASS INDEX: 35.62 KG/M2 | DIASTOLIC BLOOD PRESSURE: 80 MMHG | HEART RATE: 107 BPM | TEMPERATURE: 97.3 F | SYSTOLIC BLOOD PRESSURE: 156 MMHG

## 2020-09-25 DIAGNOSIS — G47.33 OSA (OBSTRUCTIVE SLEEP APNEA): ICD-10-CM

## 2020-09-25 DIAGNOSIS — J44.9 COPD, SEVERE (HCC): Primary | ICD-10-CM

## 2020-09-25 DIAGNOSIS — R91.1 LUNG NODULE: ICD-10-CM

## 2020-09-25 PROCEDURE — 99213 OFFICE O/P EST LOW 20 MIN: CPT | Performed by: NURSE PRACTITIONER

## 2020-09-25 NOTE — ASSESSMENT & PLAN NOTE
-  patient still reports chronic dyspnea, he is concerned about reoccurring pleural effusion however he is scheduled for PET scan next week which will determine if pleural effusion has reaccumulated  -  no indication for steroid therapy at this time as patient is not in acute exacerbation  -  patient is not interested in pulmonary rehab at this time  -  will continue home regimen: Telegy 100/62 5/25 mcg 1 puff daily and albuterol nebulizer q 6h p r n

## 2020-09-25 NOTE — ASSESSMENT & PLAN NOTE
-  upon recent hospitalization new right apical pulmonary nodule was noted  -  his initial PET scan was canceled, we were able to reschedule PET scan for next week  -  patient will likely need biopsy  -  discussed with patient that we will call with results

## 2020-09-25 NOTE — PROGRESS NOTES
Pulmonary Follow-Up Note   Reginaldo Diamond   70 y o  male MRN: [id number]  9/25/2020      Assessment/Plan:    Problem List Items Addressed This Visit        Respiratory    COPD, severe ([address] ) - Primary     -  patient still reports chronic dyspnea, he is concerned about reoccurring pleural effusion however he is scheduled for PET scan next week which will determine if pleural effusion has reaccumulated  -  no indication for steroid therapy at this time as patient is not in acute exacerbation  -  patient is not interested in pulmonary rehab at this time  -  will continue home regimen: Telegy 100/62 5/25 mcg 1 puff daily and albuterol nebulizer q 6h p r n          JACQUELINE (obstructive sleep apnea)     -  patient was diagnosed in 2017 with sleep apnea, he is uninterested in any follow-up testing at this time as he will not start PAP therapy            Other    Lung nodule     -  upon recent hospitalization new right apical pulmonary nodule was noted  -  his initial PET scan was canceled, we were able to reschedule PET scan for next week  -  patient will likely need biopsy  -  discussed with patient that we will call with results               Education provided at this visit:   Need for Vaccination:  Influenza 12/10/2019, pneumococcal conjugate 13 04/18/2017, PPV 23 09/19/2018   Pulmonary Rehab:  Not indicated   Smoking Cessation:  Patient quit 3 years ago   Lung Cancer Screening:  PET scan schedule next week   Inhaler Use: Telegy 100/62 5/25 mcg 1 puff daily and albuterol nebulizer q 6h p r n  No follow-ups on file  All of Radha Hodges questions were answered prior to leaving the office today  Radha Hodges will follow-up with Dr Norris Gil in [place] months or sooner should the need arise  Radha Hodges is aware to call our office with any further questions or concerns      History of Present Illness   Reason for Visit:  Hospital follow-up  Chief Complaint: "I feel pretty good"  HPI: Liban Rader  is a 70 y o  male who presents to the office today for hospital follow-up  Patient has past medical history of COPD, GERD, diabetes, and hypertension  Patient was recently hospitalized 7/2020 for shortness of breath likely secondary to large right pleural effusion  Patient thoracentesis which showed transudative pleural fluid and negative for malignancy  Upon imaging patient was noted to have a new right apical pulmonary nodule in which he was supposed to follow up with PET scan  Patient reports that given his increased glucose levels he reports the lab did cancel this test   Our office was able to reschedule PET scan for next week  After PET scan results patient will likely need biopsy  I did discuss that we will call patient with results of scan  Patient currently denies any fever, chills, hemoptysis, headaches, night sweats, pleuritic chest pain, or palpitations  From pulmonary standpoint, patient follows with Dr Anusha Hebert severe COPD  Patient does report chronic dyspnea  Patient is currently maintained on Telegy 100/62 5/25 mcg 1 puff daily and albuterol nebulizer q 6h p r n  Jaci Carcamo Patient reports he does not uses nebulizer  Patient does report he is maintained on 3 L continuous nasal cannula  Patient does report an approximate 1 pack per day 50 year smoking history  Patient reports he quit smoking in 2017  Patient reports some occupational exposures as he works as a   Patient denies having any pets  Patient does report symptoms of GERD in which she is maintained on Protonix daily  Patient does report being diagnosed with JACQUELINE in 2017 however he refused to follow up with any testing as he does not wish to initiate PAP therapy  Patient does report symptoms of postnasal drip in which he is maintained on Flonase  Patient denies any acute exposures to dust, mold, asbestos, or silica  Review of Systems   Constitutional: Negative for activity change and appetite change  HENT: Negative for congestion and postnasal drip  Respiratory: Positive for cough and shortness of breath  Negative for apnea, choking, chest tightness, wheezing and stridor  Cardiovascular: Negative for chest pain, palpitations and leg swelling  Gastrointestinal: Negative for abdominal distention and abdominal pain  Genitourinary: Negative for difficulty urinating and dysuria  Musculoskeletal: Negative for arthralgias and back pain  Skin: Negative for color change and pallor  Neurological: Negative for dizziness and facial asymmetry  Psychiatric/Behavioral: Negative for agitation and behavioral problems  Historical Information   Past Medical History:   Diagnosis Date    Abdominal pain     Cardiac disease     [facility]     COPD [facility]chronic obstructive pulmonary disease) [facility]     Coronary artery disease     DDD (degenerative disc disease), lumbar 9/1/2020    Diabetes mellitus ([facility] )     GERD (gastroesophageal reflux disease)     Hyperlipidemia     Hypertension     MI (myocardial infarction) ([place] [address] )     with 3 stents    Prostate cancer ([facility] )      Past Surgical History:   Procedure Laterality Date    ABDOMINAL SURGERY      exploratory    ANGIOPLASTY      3 stents    APPENDECTOMY      COLONOSCOPY  2015    ESOPHAGOGASTRODUODENOSCOPY N/A 10/2/2017    Procedure: ESOPHAGOGASTRODUODENOSCOPY (EGD); Surgeon: Altagracia Arevalo MD;  [facility];   Service: Gastroenterology    KNEE CARTILAGE SURGERY      OTHER SURGICAL HISTORY      stent placement    PROSTATE SURGERY      SKIN GRAFT      Basal cell CA back     Family History   Problem Relation Age of Onset    Heart disease Father     Other Father         Mesothelioma      Social History   Social History     Substance and Sexual Activity   Alcohol Use Not Currently    Frequency: Never    Binge frequency: Never     Social History     Substance and Sexual Activity   Drug Use No     Social History     Tobacco Use   Smoking Status Current Some Day Smoker    Packs/day: 1 50    Years: 50 00    Pack years: 75 00    Last attempt to quit: 9/13/2017    Years since quitting: 3 0   Smokeless Tobacco Never Used     E-Cigarette/Vaping    E-Cigarette Use Never User      E-Cigarette/Vaping Substances    Nicotine No     THC No     CBD No     Flavoring No     Other No     Unknown No        Meds/Allergies     Current Outpatient Medications:     aspirin (ECOTRIN LOW STRENGTH) 81 mg EC tablet, Take 1 tablet by mouth daily, Disp: , Rfl:     budesonide (PULMICORT) 0 5 mg/2 mL nebulizer solution, Take 1 vial (0 5 mg total) by nebulization every 12 (twelve) hours Rinse mouth after use , Disp: 1 vial, Rfl: 0    cyclobenzaprine (FLEXERIL) 10 mg tablet, TAKE ONE TABLET BY MOUTH THREE TIMES A DAY, Disp: 60 tablet, Rfl: 2    ferrous sulfate 325 (65 Fe) mg tablet, Take 325 mg by mouth daily with breakfast, Disp: , Rfl:     fluticasone (FLONASE) 50 mcg/act nasal spray, 1 spray into each nostril daily, Disp: 16 g, Rfl: 0    furosemide (LASIX) 20 mg tablet, Take 3 tablets (60 mg total) by mouth daily, Disp: 90 tablet, Rfl: 0    guaiFENesin (MUCINEX) 600 mg 12 hr tablet, Take 1 tablet (600 mg total) by mouth every 12 (twelve) hours, Disp: 30 tablet, Rfl: 0    insulin regular (HumuLIN R U-500) 500 units/mL CONCENTRATED injection, Inject 0 09 mL (45 Units total) under the skin 2 (two) times a day before breakfast and lunch, Disp: 30 mL, Rfl: 3    Insulin Syringe/Needle U-500 (BD Insulin Syringe U-500) 31G X 6MM 0 5 ML MISC, Use 1 syringe three times a day for injecting insulin Pt needs an appt, Disp: 100 each, Rfl: 0    ketoconazole (NIZORAL) 2 % cream, ketoconazole 2 % topical cream, Disp: , Rfl:     levalbuterol (XOPENEX) 1 25 mg/3 mL nebulizer solution, USE 1 VIAL IN NEBULIZER THREE TIMES A DAY, Disp: , Rfl: 5    metoprolol tartrate (LOPRESSOR) 25 mg tablet, TAKE ONE TABLET BY MOUTH TWICE A DAY, Disp: 60 tablet, Rfl: 5    Omega-3 Fatty Acids (FISH OIL) 645 MG CAPS, Take 1 capsule by mouth 2 (two) times a day, Disp: , Rfl:     ONE TOUCH ULTRA TEST test strip, TEST FOUR TIMES A DAY, Disp: 150 each, Rfl: 5    oxyCODONE-acetaminophen (PERCOCET) 7 5-325 MG per tablet, Take by mouth every 4 (four) hours, Disp: , Rfl:     pantoprazole (PROTONIX) 40 mg tablet, TAKE 1 TABLET BY MOUTH EVERY DAY, Disp: 60 tablet, Rfl: 2    potassium chloride (K-DUR,KLOR-CON) 20 mEq tablet, TAKE 1 TABLET BY MOUTH EVERY DAY, Disp: 60 tablet, Rfl: 2    simvastatin (ZOCOR) 40 mg tablet, TAKE 1 TABLET BY MOUTH EVERY DAY, Disp: 60 tablet, Rfl: 2    tamsulosin (FLOMAX) 0 4 mg, TAKE 1 CAPSULE BY MOUTH EVERY DAY, Disp: 60 capsule, Rfl: 2    traMADol (ULTRAM) 50 mg tablet, tramadol 50 mg tablet, Disp: , Rfl:     TRELEGY ELLIPTA 100-62 5-25 MCG/INH inhaler, INHALE 1 PUFF EVERY DAY, Disp: 60 each, Rfl: 3  Allergies   Allergen Reactions    Crestor [Rosuvastatin]     Metformin GI Intolerance       Vitals: Blood pressure 156/80, pulse (!) 107, temperature (!) 97 3 °F (36 3 °C), temperature source Tympanic, height 6' (1 829 m), weight 119 kg (263 lb), SpO2 98 %  Body mass index is 35 67 kg/m²  Oxygen Therapy  SpO2: 98 %  Oxygen Therapy: Supplemental oxygen  O2 Delivery Method: Nasal cannula  O2 Flow Rate (L/min): 3 L/min    Physical Exam:  Physical Exam  Constitutional:       General: He is not in acute distress  Appearance: Normal appearance  He is normal weight  He is not ill-appearing  HENT:      Head: Normocephalic and atraumatic  Nose: Nose normal  No congestion or rhinorrhea  Mouth/Throat:      Mouth: Mucous membranes are moist       Pharynx: No oropharyngeal exudate  Neck:      Musculoskeletal: Normal range of motion and neck supple  No neck rigidity or muscular tenderness  Cardiovascular:      Rate and Rhythm: Normal rate and regular rhythm  Pulses: Normal pulses  Heart sounds: Normal heart sounds  No murmur  No friction rub  No gallop      Pulmonary:      Effort: Pulmonary effort is normal  No tachypnea, bradypnea, accessory muscle usage or respiratory distress  Breath sounds: Decreased air movement present  No stridor or transmitted upper airway sounds  No decreased breath sounds, wheezing, rhonchi or rales  Comments: Significantly diminished aeration throughout lung fields  Chest:      Chest wall: No tenderness  Abdominal:      General: Abdomen is flat  There is no distension  Palpations: Abdomen is soft  There is no mass  Musculoskeletal: Normal range of motion  General: No swelling or tenderness  Skin:     General: Skin is warm and dry  Coloration: Skin is not jaundiced or pale  Neurological:      General: No focal deficit present  Mental Status: He is alert and oriented to person, place, and time  Psychiatric:         Mood and Affect: Mood normal          Behavior: Behavior normal          Imaging and other studies: I have personally reviewed pertinent films in PACS     Chest x-ray 7/28/2020- stable right pleural effusion    Pulmonary Results (PFTs, PSG): I have personally reviewed pertinent films in PACS     No PFTs recorded        YOLANDE Mei  [facility]        Portions of the record may have been created with voice recognition software  Occasional wrong word or "sound a like" substitutions may have occurred due to the inherent limitations of voice recognition software  Read the chart carefully and recognize, using context, where substitutions have occurred or contact the dictating provider

## 2020-09-25 NOTE — ASSESSMENT & PLAN NOTE
-  patient was diagnosed in 2017 with sleep apnea, he is uninterested in any follow-up testing at this time as he will not start PAP therapy

## 2020-09-30 ENCOUNTER — OFFICE VISIT (OUTPATIENT)
Dept: CARDIOLOGY CLINIC | Facility: CLINIC | Age: 75
End: 2020-09-30
Payer: COMMERCIAL

## 2020-09-30 VITALS
HEIGHT: 72 IN | BODY MASS INDEX: 35.62 KG/M2 | SYSTOLIC BLOOD PRESSURE: 112 MMHG | WEIGHT: 263 LBS | DIASTOLIC BLOOD PRESSURE: 62 MMHG | TEMPERATURE: 96.8 F | HEART RATE: 88 BPM

## 2020-09-30 DIAGNOSIS — I27.20 PULMONARY HYPERTENSION (HCC): ICD-10-CM

## 2020-09-30 DIAGNOSIS — J96.21 ACUTE ON CHRONIC RESPIRATORY FAILURE WITH HYPOXIA AND HYPERCAPNIA (HCC): Primary | ICD-10-CM

## 2020-09-30 DIAGNOSIS — J44.9 COPD, SEVERE (HCC): ICD-10-CM

## 2020-09-30 DIAGNOSIS — I50.32 CHRONIC DIASTOLIC HEART FAILURE (HCC): ICD-10-CM

## 2020-09-30 DIAGNOSIS — J96.22 ACUTE ON CHRONIC RESPIRATORY FAILURE WITH HYPOXIA AND HYPERCAPNIA (HCC): Primary | ICD-10-CM

## 2020-09-30 DIAGNOSIS — I25.10 CORONARY ARTERY DISEASE INVOLVING NATIVE CORONARY ARTERY OF NATIVE HEART WITHOUT ANGINA PECTORIS: ICD-10-CM

## 2020-09-30 PROCEDURE — 99214 OFFICE O/P EST MOD 30 MIN: CPT | Performed by: INTERNAL MEDICINE

## 2020-09-30 RX ORDER — NITROGLYCERIN 0.4 MG/1
0.4 TABLET SUBLINGUAL
Qty: 30 TABLET | Refills: 5 | Status: SHIPPED | OUTPATIENT
Start: 2020-09-30 | End: 2022-01-30 | Stop reason: HOSPADM

## 2020-09-30 NOTE — PROGRESS NOTES
Cardiology   Pablito Booth  70 y o  male MRN: [id number]        Impression:  1  Dyspnea - multifactorial, but does not seem volume overloaded at this time  2  Coronary artery disease - stable  3  Hypertension - controlled  4  Dyslipidemia - on statin      Recommendations:  1  Continue current medications  2  Will prescribe SL NTG for intermittent chest pain  3  Follow up in 4 months  HPI: Sally Olivares Sr  is a 70y o  year old male with coronary artery disease, COPD, and pulmonary hypertension, recently in the hospital in July 2020 with dyspnea and R pleural effusion  Echo demonstrated normal LV with dilated RV and decreased RV systolic function  Currently on 4L O2  Has dyspnea with minimal exertion  Does get occasional chest pain with exertion - 1-2x/week  Lasts for 1-2 hours              Review of Systems   Constitutional: Negative  HENT: Negative  Eyes: Negative  Respiratory: Positive for shortness of breath  Negative for chest tightness  Cardiovascular: Negative for chest pain, palpitations and leg swelling  Gastrointestinal: Negative  Endocrine: Negative  Genitourinary: Negative  Musculoskeletal: Positive for back pain and gait problem  Skin: Negative  Allergic/Immunologic: Negative  Hematological: Negative  Psychiatric/Behavioral: Negative  All other systems reviewed and are negative          Past Medical History:   Diagnosis Date    Abdominal pain     Cardiac disease     CHF (congestive heart failure) ([facility])     COPD (chronic obstructive pulmonary disease) ([facility])     Coronary artery disease     DDD (degenerative disc disease), lumbar 9/1/2020    [facility]     GERD (gastroesophageal reflux disease)     Hyperlipidemia     Hypertension     MI [facility]myocardial infarction) [facility] )     with 3 stents    Prostate cancer ([facility] )      Past Surgical History:   Procedure Laterality Date    ABDOMINAL SURGERY      exploratory    ANGIOPLASTY 3 stents    APPENDECTOMY      COLONOSCOPY  2015    ESOPHAGOGASTRODUODENOSCOPY N/A 10/2/2017    Procedure: ESOPHAGOGASTRODUODENOSCOPY (EGD); Surgeon: Debo Adams MD;  [facility]; Service: Gastroenterology    KNEE CARTILAGE SURGERY      OTHER SURGICAL HISTORY      stent placement    PROSTATE SURGERY      SKIN GRAFT      Basal cell CA back     Social History     Substance and Sexual Activity   Alcohol Use Not Currently    Frequency: Never    Binge frequency: Never     Social History     Substance and Sexual Activity   Drug Use No     Social History     Tobacco Use   Smoking Status Current Some Day Smoker    Packs/day: 1 50    Years: 50 00    Pack years: 75 00    Last attempt to quit: 9/13/2017    Years since quitting: 3 0   Smokeless Tobacco Never Used     Family History   Problem Relation Age of Onset    Heart disease Father     Other Father         Mesothelioma        Allergies:   Allergies   Allergen Reactions    Crestor [Rosuvastatin]     Metformin GI Intolerance       Medications:     Current Outpatient Medications:     aspirin (ECOTRIN LOW STRENGTH) 81 mg EC tablet, Take 1 tablet by mouth daily, Disp: , Rfl:     budesonide (PULMICORT) 0 5 mg/2 mL nebulizer solution, Take 1 vial (0 5 mg total) by nebulization every 12 (twelve) hours Rinse mouth after use , Disp: 1 vial, Rfl: 0    cyclobenzaprine (FLEXERIL) 10 mg tablet, TAKE ONE TABLET BY MOUTH THREE TIMES A DAY, Disp: 60 tablet, Rfl: 2    ferrous sulfate 325 (65 Fe) mg tablet, Take 325 mg by mouth daily with breakfast, Disp: , Rfl:     fluticasone (FLONASE) 50 mcg/act nasal spray, 1 spray into each nostril daily, Disp: 16 g, Rfl: 0    furosemide (LASIX) 20 mg tablet, Take 3 tablets (60 mg total) by mouth daily, Disp: 90 tablet, Rfl: 0    guaiFENesin (MUCINEX) 600 mg 12 hr tablet, Take 1 tablet (600 mg total) by mouth every 12 (twelve) hours, Disp: 30 tablet, Rfl: 0    insulin regular (HumuLIN R U-500) 500 units/mL CONCENTRATED injection, Inject 0 09 mL (45 Units total) under the skin 2 (two) times a day before breakfast and lunch, Disp: 30 mL, Rfl: 3    Insulin Syringe/Needle U-500 (BD Insulin Syringe U-500) 31G X 6MM 0 5 ML MISC, Use 1 syringe three times a day for injecting insulin Pt needs an appt, Disp: 100 each, Rfl: 0    ketoconazole (NIZORAL) 2 % cream, ketoconazole 2 % topical cream, Disp: , Rfl:     levalbuterol (XOPENEX) 1 25 mg/3 mL nebulizer solution, USE 1 VIAL IN NEBULIZER THREE TIMES A DAY, Disp: , Rfl: 5    metoprolol tartrate (LOPRESSOR) 25 mg tablet, TAKE ONE TABLET BY MOUTH TWICE A DAY, Disp: 60 tablet, Rfl: 5    Omega-3 Fatty Acids (FISH OIL) 645 MG CAPS, Take 1 capsule by mouth 2 (two) times a day, Disp: , Rfl:     ONE TOUCH ULTRA TEST test strip, TEST FOUR TIMES A DAY, Disp: 150 each, Rfl: 5    oxyCODONE-acetaminophen (PERCOCET) 7 5-325 MG per tablet, Take by mouth every 4 (four) hours, Disp: , Rfl:     pantoprazole (PROTONIX) 40 mg tablet, TAKE 1 TABLET BY MOUTH EVERY DAY, Disp: 60 tablet, Rfl: 2    potassium chloride (K-DUR,KLOR-CON) 20 mEq tablet, TAKE 1 TABLET BY MOUTH EVERY DAY, Disp: 60 tablet, Rfl: 2    simvastatin (ZOCOR) 40 mg tablet, TAKE 1 TABLET BY MOUTH EVERY DAY, Disp: 60 tablet, Rfl: 2    tamsulosin (FLOMAX) 0 4 mg, TAKE 1 CAPSULE BY MOUTH EVERY DAY, Disp: 60 capsule, Rfl: 2    traMADol (ULTRAM) 50 mg tablet, tramadol 50 mg tablet, Disp: , Rfl:     TRELEGY ELLIPTA 100-62 5-25 MCG/INH inhaler, INHALE 1 PUFF EVERY DAY, Disp: 60 each, Rfl: 3      Wt Readings from Last 3 Encounters:   09/30/20 119 kg (263 lb)   09/25/20 119 kg (263 lb)   08/17/20 118 kg (261 lb)     Temp Readings from Last 3 Encounters:   09/30/20 (!) 96 8 °F (36 °C)   09/25/20 (!) 97 3 °F (36 3 °C) (Tympanic)   08/17/20 97 8 °F (36 6 °C)     BP Readings from Last 3 Encounters:   09/30/20 112/62   09/25/20 156/80   08/17/20 150/60     Pulse Readings from Last 3 Encounters:   09/30/20 88   09/25/20 (!) 107 20 96         Physical Exam  HENT:      Head: Atraumatic  Mouth/Throat:      Mouth: Mucous membranes are moist    Eyes:      Extraocular Movements: Extraocular movements intact  Neck:      Musculoskeletal: Normal range of motion  Cardiovascular:      Rate and Rhythm: Normal rate and regular rhythm  Heart sounds: Normal heart sounds  Pulmonary:      Effort: Pulmonary effort is normal       Breath sounds: Normal breath sounds  Abdominal:      General: Abdomen is flat  Musculoskeletal: Normal range of motion  Skin:     General: Skin is warm  Neurological:      General: No focal deficit present  Mental Status: He is alert and oriented to person, place, and time     Psychiatric:         Mood and Affect: Mood normal          Behavior: Behavior normal            Laboratory Studies:  CMP:  Lab Results   Component Value Date     2015    K 3 8 2020    CL 92 (L) 2020    CO2 40 (H) 2020    ANIONGAP 4 2015    BUN 32 (H) 2020    CREATININE 1 26 2020    GLUCOSE 513 (HH) 2019    AST 34 2020    ALT 28 2020    BILITOT 0 27 2015    EGFR 57 2020       Lipid Profile:   Lab Results   Component Value Date    CHOL 152 09/10/2015     Lab Results   Component Value Date    HDL 40 03/10/2020     Lab Results   Component Value Date    LDLCALC 74 03/10/2020     Lab Results   Component Value Date    TRIG 216 (H) 03/10/2020       Cardiac testing:     Results for orders placed during the hospital encounter of 20   Echo complete with contrast if indicated    Narrative [address]  [phone]    Transthoracic Echocardiogram  2D, Doppler, and Color Doppler    Study date:  2020    Patient: Sharath Urrutia  MR number: [id number]  Account number: [de-identified]  : 1945  Age: 69 years  Gender: Male  Status: Inpatient  Location: Bedside  Height: 72 in  Weight: 265 lb  BP: 158/ 83 mmHg    Indications: Heart Failure    Diagnoses: I50 9 - Heart failure, unspecified    Sonographer:  JOLIE Shannon  Primary Physician:  Socrates Melgar MD  Referring Physician: YOLANDE Carrillo  Group:  [facility]  Cardiology Fellow:  Freya Hernandez DO  Interpreting Physician:  Mikey Alexander MD    SUMMARY    PROCEDURE INFORMATION:  This was a technically difficult study  Cardiac structures were not all visualized  Intravenous contrast (  4ml Definity in NSS) was administered to opacify the left ventricle  LEFT VENTRICLE:  Systolic function was normal  Ejection fraction was estimated to be 68 %  There were no regional wall motion abnormalities  Wall thickness was increased  Concentric hypertrophy was present  Doppler parameters were consistent with abnormal left ventricular relaxation (grade 1 diastolic dysfunction)  RIGHT VENTRICLE:  The ventricle was dilated  Systolic function was reduced  LEFT ATRIUM:  The atrium was dilated  HISTORY: PRIOR HISTORY: GERD,Pleural effusion,Obesity,DM2,CKD3,MI,COPD,SOB,CHF,CAD,CP,RBBB,JACQUELINE,HTN,HLD    PROCEDURE: The procedure was performed at the bedside  This was a routine study  The transthoracic approach was used  The study included complete 2D imaging, complete spectral Doppler, and color Doppler  The heart rate was 92 bpm, at the  start of the study  Intravenous contrast (  4ml Definity in NSS) was administered to opacify the left ventricle  Echocardiographic views were limited due to poor acoustic window availability, decreased penetration, and lung interference  No M-mode measurements were possible  This was a technically difficult study  Cardiac structures were not all visualized  LEFT VENTRICLE: Size was normal  Systolic function was normal  Ejection fraction was estimated to be 68 %  There were no regional wall motion abnormalities  Wall thickness was increased   Concentric hypertrophy was present  DOPPLER: Doppler  parameters were consistent with abnormal left ventricular relaxation (grade 1 diastolic dysfunction)  RIGHT VENTRICLE: The ventricle was dilated  Systolic function was reduced  LEFT ATRIUM: The atrium was dilated  MITRAL VALVE: Valve structure was normal  There was normal leaflet separation  DOPPLER: The transmitral velocity was within the normal range  There was no evidence for stenosis  There was no regurgitation  AORTIC VALVE: The valve was trileaflet  Leaflets exhibited normal cuspal separation and sclerosis  DOPPLER: Transaortic velocity was within the normal range  There was no evidence for stenosis  There was no regurgitation  PERICARDIUM: There was no pericardial effusion  The pericardium was normal in appearance  AORTA: The root exhibited normal size  SYSTEM MEASUREMENT TABLES    PW  E': 0 08 m/s  E/E': 9 45  MV A Jose: 1 04 m/s  MV Dec Barnes: 4 07 m/s2  MV DecT: 195 86 ms  MV E Jose: 0 8 m/s  MV E/A Ratio: 0 76  MV PHT: 56 8 ms  MVA By PHT: 3 87 cm2    [employer] Accredited Echocardiography Laboratory    Prepared and electronically signed by    Kirby Hoffman MD  Signed 30-Jul-2020 13:42:14       No results found for this or any previous visit  No results found for this or any previous visit  No results found for this or any previous visit

## 2020-09-30 NOTE — PATIENT INSTRUCTIONS
Recommendations:  1  Continue current medications  2  Will prescribe SL NTG for intermittent chest pain  3  Follow up in 4 months

## 2020-10-01 DIAGNOSIS — E11.65 TYPE 2 DIABETES MELLITUS WITH HYPERGLYCEMIA, WITH LONG-TERM CURRENT USE OF INSULIN (HCC): Chronic | ICD-10-CM

## 2020-10-01 DIAGNOSIS — Z79.4 TYPE 2 DIABETES MELLITUS WITH HYPERGLYCEMIA, WITH LONG-TERM CURRENT USE OF INSULIN (HCC): Chronic | ICD-10-CM

## 2020-10-01 RX ORDER — SYRINGE,INSUL U-500,NDL,0.5ML 31GX15/64"
SYRINGE, EMPTY DISPOSABLE MISCELLANEOUS
Qty: 100 EACH | Refills: 5 | Status: SHIPPED | OUTPATIENT
Start: 2020-10-01 | End: 2021-10-27

## 2020-10-02 ENCOUNTER — HOSPITAL ENCOUNTER (OUTPATIENT)
Dept: RADIOLOGY | Age: 75
Discharge: HOME | End: 2020-10-02
Payer: COMMERCIAL

## 2020-10-02 DIAGNOSIS — D38.1 NEOPLASM OF UNCERTAIN BEHAVIOR OF RIGHT UPPER LOBE OF LUNG: ICD-10-CM

## 2020-10-02 LAB — GLUCOSE SERPL-MCNC: 119 MG/DL (ref 65–140)

## 2020-10-02 PROCEDURE — 78815 PET IMAGE W/CT SKULL-THIGH: CPT

## 2020-10-02 PROCEDURE — A9552 F18 FDG: HCPCS

## 2020-10-02 PROCEDURE — 82948 REAGENT STRIP/BLOOD GLUCOSE: CPT

## 2020-10-06 ENCOUNTER — TELEPHONE (OUTPATIENT)
Dept: PULMONOLOGY | Facility: CLINIC | Age: 75
End: 2020-10-06

## 2020-10-06 DIAGNOSIS — R91.1 LUNG NODULE: Primary | ICD-10-CM

## 2020-10-14 ENCOUNTER — HOSPITAL ENCOUNTER (OUTPATIENT)
Dept: PULMONOLOGY | Facility: HOSPITAL | Age: 75
Discharge: HOME | End: 2020-10-14
Attending: INTERNAL MEDICINE
Payer: COMMERCIAL

## 2020-10-14 DIAGNOSIS — R91.1 LUNG NODULE: ICD-10-CM

## 2020-10-14 PROCEDURE — 94729 DIFFUSING CAPACITY: CPT | Performed by: INTERNAL MEDICINE

## 2020-10-14 PROCEDURE — 94729 DIFFUSING CAPACITY: CPT

## 2020-10-14 PROCEDURE — 94010 BREATHING CAPACITY TEST: CPT | Performed by: INTERNAL MEDICINE

## 2020-10-14 PROCEDURE — 94726 PLETHYSMOGRAPHY LUNG VOLUMES: CPT

## 2020-10-14 PROCEDURE — 94726 PLETHYSMOGRAPHY LUNG VOLUMES: CPT | Performed by: INTERNAL MEDICINE

## 2020-10-14 PROCEDURE — 94010 BREATHING CAPACITY TEST: CPT

## 2020-10-14 PROCEDURE — 94760 N-INVAS EAR/PLS OXIMETRY 1: CPT

## 2020-10-19 ENCOUNTER — DOCUMENTATION (OUTPATIENT)
Dept: CARDIAC SURGERY | Facility: CLINIC | Age: 75
End: 2020-10-19

## 2020-10-19 DIAGNOSIS — R91.1 LUNG NODULE: Primary | ICD-10-CM

## 2020-10-20 ENCOUNTER — TELEPHONE (OUTPATIENT)
Dept: SURGICAL ONCOLOGY | Facility: CLINIC | Age: 75
End: 2020-10-20

## 2020-10-20 ENCOUNTER — DOCUMENTATION (OUTPATIENT)
Dept: PULMONOLOGY | Facility: CLINIC | Age: 75
End: 2020-10-20

## 2020-10-22 ENCOUNTER — PREP FOR PROCEDURE (OUTPATIENT)
Dept: INTERVENTIONAL RADIOLOGY/VASCULAR | Facility: CLINIC | Age: 75
End: 2020-10-22

## 2020-10-22 ENCOUNTER — OFFICE VISIT (OUTPATIENT)
Dept: CARDIAC SURGERY | Facility: CLINIC | Age: 75
End: 2020-10-22
Payer: COMMERCIAL

## 2020-10-22 VITALS
OXYGEN SATURATION: 97 % | BODY MASS INDEX: 35.21 KG/M2 | TEMPERATURE: 97 F | DIASTOLIC BLOOD PRESSURE: 78 MMHG | WEIGHT: 260 LBS | SYSTOLIC BLOOD PRESSURE: 140 MMHG | HEART RATE: 67 BPM | HEIGHT: 72 IN

## 2020-10-22 DIAGNOSIS — J90 PLEURAL EFFUSION ON RIGHT: Primary | ICD-10-CM

## 2020-10-22 DIAGNOSIS — J90 PLEURAL EFFUSION ON RIGHT: ICD-10-CM

## 2020-10-22 DIAGNOSIS — R91.1 LUNG NODULE: Primary | ICD-10-CM

## 2020-10-22 DIAGNOSIS — R91.1 LUNG NODULE: ICD-10-CM

## 2020-10-22 PROCEDURE — 99205 OFFICE O/P NEW HI 60 MIN: CPT | Performed by: PHYSICIAN ASSISTANT

## 2020-11-02 ENCOUNTER — TELEPHONE (OUTPATIENT)
Dept: INPATIENT UNIT | Facility: HOSPITAL | Age: 75
End: 2020-11-02

## 2020-11-02 ENCOUNTER — TELEPHONE (OUTPATIENT)
Dept: RADIOLOGY | Facility: HOSPITAL | Age: 75
End: 2020-11-02

## 2020-11-03 ENCOUNTER — APPOINTMENT (OUTPATIENT)
Dept: RADIOLOGY | Facility: HOSPITAL | Age: 75
End: 2020-11-03
Attending: RADIOLOGY
Payer: COMMERCIAL

## 2020-11-03 ENCOUNTER — HOSPITAL ENCOUNTER (OUTPATIENT)
Dept: RADIOLOGY | Facility: HOSPITAL | Age: 75
Discharge: HOME | End: 2020-11-03
Attending: RADIOLOGY | Admitting: RADIOLOGY
Payer: COMMERCIAL

## 2020-11-03 VITALS
TEMPERATURE: 98 F | DIASTOLIC BLOOD PRESSURE: 60 MMHG | HEART RATE: 115 BPM | WEIGHT: 270 LBS | HEIGHT: 72 IN | RESPIRATION RATE: 17 BRPM | SYSTOLIC BLOOD PRESSURE: 132 MMHG | OXYGEN SATURATION: 97 % | BODY MASS INDEX: 36.57 KG/M2

## 2020-11-03 DIAGNOSIS — J90 PLEURAL EFFUSION ON RIGHT: ICD-10-CM

## 2020-11-03 DIAGNOSIS — R91.1 LUNG NODULE: ICD-10-CM

## 2020-11-03 LAB
BASOPHILS # BLD AUTO: 0.03 THOUSANDS/ΜL (ref 0–0.1)
BASOPHILS NFR BLD AUTO: 0 % (ref 0–1)
EOSINOPHIL # BLD AUTO: 0.11 THOUSAND/ΜL (ref 0–0.61)
EOSINOPHIL NFR BLD AUTO: 1 % (ref 0–6)
ERYTHROCYTE [DISTWIDTH] IN BLOOD BY AUTOMATED COUNT: 15.4 % (ref 11.6–15.1)
GLUCOSE SERPL-MCNC: 157 MG/DL (ref 65–140)
HCT VFR BLD AUTO: 40.6 % (ref 36.5–49.3)
HGB BLD-MCNC: 12.4 G/DL (ref 12–17)
IMM GRANULOCYTES # BLD AUTO: 0.12 THOUSAND/UL (ref 0–0.2)
IMM GRANULOCYTES NFR BLD AUTO: 1 % (ref 0–2)
INR PPP: 0.98 (ref 0.84–1.19)
LYMPHOCYTES # BLD AUTO: 1.12 THOUSANDS/ΜL (ref 0.6–4.47)
LYMPHOCYTES NFR BLD AUTO: 10 % (ref 14–44)
MCH RBC QN AUTO: 25.1 PG (ref 26.8–34.3)
MCHC RBC AUTO-ENTMCNC: 30.5 G/DL (ref 31.4–37.4)
MCV RBC AUTO: 82 FL (ref 82–98)
MONOCYTES # BLD AUTO: 0.69 THOUSAND/ΜL (ref 0.17–1.22)
MONOCYTES NFR BLD AUTO: 6 % (ref 4–12)
NEUTROPHILS # BLD AUTO: 9.55 THOUSANDS/ΜL (ref 1.85–7.62)
NEUTS SEG NFR BLD AUTO: 82 % (ref 43–75)
NRBC BLD AUTO-RTO: 0 /100 WBCS
PLATELET # BLD AUTO: 147 THOUSANDS/UL (ref 149–390)
PMV BLD AUTO: 10.1 FL (ref 8.9–12.7)
PROTHROMBIN TIME: 13 SECONDS (ref 11.6–14.5)
RBC # BLD AUTO: 4.95 MILLION/UL (ref 3.88–5.62)
WBC # BLD AUTO: 11.62 THOUSAND/UL (ref 4.31–10.16)

## 2020-11-03 PROCEDURE — 88305 TISSUE EXAM BY PATHOLOGIST: CPT | Performed by: PATHOLOGY

## 2020-11-03 PROCEDURE — 82948 REAGENT STRIP/BLOOD GLUCOSE: CPT

## 2020-11-03 PROCEDURE — 88333 PATH CONSLTJ SURG CYTO XM 1: CPT | Performed by: PATHOLOGY

## 2020-11-03 PROCEDURE — 88342 IMHCHEM/IMCYTCHM 1ST ANTB: CPT | Performed by: PATHOLOGY

## 2020-11-03 PROCEDURE — 88341 IMHCHEM/IMCYTCHM EA ADD ANTB: CPT | Performed by: PATHOLOGY

## 2020-11-03 PROCEDURE — 32555 ASPIRATE PLEURA W/ IMAGING: CPT

## 2020-11-03 PROCEDURE — 99152 MOD SED SAME PHYS/QHP 5/>YRS: CPT

## 2020-11-03 PROCEDURE — 32405 PR BIOPSY LUNG/MEDIASTINUM PERCUTANEOUS NEEDLE: CPT | Performed by: RADIOLOGY

## 2020-11-03 PROCEDURE — 88112 CYTOPATH CELL ENHANCE TECH: CPT | Performed by: PATHOLOGY

## 2020-11-03 PROCEDURE — 85610 PROTHROMBIN TIME: CPT | Performed by: RADIOLOGY

## 2020-11-03 PROCEDURE — 77012 CT SCAN FOR NEEDLE BIOPSY: CPT | Performed by: RADIOLOGY

## 2020-11-03 PROCEDURE — 32555 ASPIRATE PLEURA W/ IMAGING: CPT | Performed by: RADIOLOGY

## 2020-11-03 PROCEDURE — 32405 HB PERCUT BX LUNG/MEDIASTINUM: CPT

## 2020-11-03 PROCEDURE — 85025 COMPLETE CBC W/AUTO DIFF WBC: CPT | Performed by: RADIOLOGY

## 2020-11-03 PROCEDURE — 99153 MOD SED SAME PHYS/QHP EA: CPT

## 2020-11-03 PROCEDURE — 99152 MOD SED SAME PHYS/QHP 5/>YRS: CPT | Performed by: RADIOLOGY

## 2020-11-03 RX ORDER — FENTANYL CITRATE 50 UG/ML
INJECTION, SOLUTION INTRAMUSCULAR; INTRAVENOUS CODE/TRAUMA/SEDATION MEDICATION
Status: COMPLETED | OUTPATIENT
Start: 2020-11-03 | End: 2020-11-03

## 2020-11-03 RX ORDER — LIDOCAINE WITH 8.4% SOD BICARB 0.9%(10ML)
SYRINGE (ML) INJECTION CODE/TRAUMA/SEDATION MEDICATION
Status: COMPLETED | OUTPATIENT
Start: 2020-11-03 | End: 2020-11-03

## 2020-11-03 RX ORDER — HYDRALAZINE HYDROCHLORIDE 20 MG/ML
10 INJECTION INTRAMUSCULAR; INTRAVENOUS EVERY 6 HOURS PRN
Status: DISCONTINUED | OUTPATIENT
Start: 2020-11-03 | End: 2020-11-03

## 2020-11-03 RX ORDER — OXYCODONE HYDROCHLORIDE 10 MG/1
10 TABLET ORAL EVERY 4 HOURS PRN
Status: DISCONTINUED | OUTPATIENT
Start: 2020-11-03 | End: 2020-11-03 | Stop reason: HOSPADM

## 2020-11-03 RX ORDER — MIDAZOLAM HYDROCHLORIDE 2 MG/2ML
INJECTION, SOLUTION INTRAMUSCULAR; INTRAVENOUS CODE/TRAUMA/SEDATION MEDICATION
Status: COMPLETED | OUTPATIENT
Start: 2020-11-03 | End: 2020-11-03

## 2020-11-03 RX ORDER — HYDRALAZINE HYDROCHLORIDE 20 MG/ML
10 INJECTION INTRAMUSCULAR; INTRAVENOUS ONCE
Status: DISCONTINUED | OUTPATIENT
Start: 2020-11-03 | End: 2020-11-03 | Stop reason: HOSPADM

## 2020-11-03 RX ORDER — METOPROLOL TARTRATE 5 MG/5ML
5 INJECTION INTRAVENOUS EVERY 6 HOURS PRN
Status: DISCONTINUED | OUTPATIENT
Start: 2020-11-03 | End: 2020-11-03

## 2020-11-03 RX ORDER — ACETAMINOPHEN 325 MG/1
650 TABLET ORAL EVERY 4 HOURS PRN
Status: DISCONTINUED | OUTPATIENT
Start: 2020-11-03 | End: 2020-11-03 | Stop reason: HOSPADM

## 2020-11-03 RX ORDER — METOPROLOL TARTRATE 5 MG/5ML
5 INJECTION INTRAVENOUS ONCE
Status: DISCONTINUED | OUTPATIENT
Start: 2020-11-03 | End: 2020-11-03 | Stop reason: HOSPADM

## 2020-11-03 RX ORDER — MORPHINE SULFATE 15 MG/1
15 TABLET ORAL EVERY 4 HOURS PRN
Status: DISCONTINUED | OUTPATIENT
Start: 2020-11-03 | End: 2020-11-03 | Stop reason: HOSPADM

## 2020-11-03 RX ADMIN — MIDAZOLAM 0.5 MG: 1 INJECTION INTRAMUSCULAR; INTRAVENOUS at 12:04

## 2020-11-03 RX ADMIN — MIDAZOLAM 0.5 MG: 1 INJECTION INTRAMUSCULAR; INTRAVENOUS at 11:57

## 2020-11-03 RX ADMIN — Medication 10 ML: at 12:00

## 2020-11-03 RX ADMIN — Medication 10 ML: at 11:04

## 2020-11-03 RX ADMIN — FENTANYL CITRATE 25 MCG: 50 INJECTION, SOLUTION INTRAMUSCULAR; INTRAVENOUS at 11:57

## 2020-11-03 RX ADMIN — FENTANYL CITRATE 25 MCG: 50 INJECTION, SOLUTION INTRAMUSCULAR; INTRAVENOUS at 12:17

## 2020-11-03 RX ADMIN — FENTANYL CITRATE 25 MCG: 50 INJECTION, SOLUTION INTRAMUSCULAR; INTRAVENOUS at 12:04

## 2020-11-03 RX ADMIN — MIDAZOLAM 0.5 MG: 1 INJECTION INTRAMUSCULAR; INTRAVENOUS at 11:53

## 2020-11-03 RX ADMIN — FENTANYL CITRATE 25 MCG: 50 INJECTION, SOLUTION INTRAMUSCULAR; INTRAVENOUS at 11:53

## 2020-11-03 RX ADMIN — MIDAZOLAM 0.5 MG: 1 INJECTION INTRAMUSCULAR; INTRAVENOUS at 12:17

## 2020-11-17 DIAGNOSIS — K21.9 GASTROESOPHAGEAL REFLUX DISEASE: ICD-10-CM

## 2020-11-17 DIAGNOSIS — G89.29 CHRONIC BILATERAL LOW BACK PAIN, UNSPECIFIED WHETHER SCIATICA PRESENT: ICD-10-CM

## 2020-11-17 DIAGNOSIS — M54.50 CHRONIC BILATERAL LOW BACK PAIN, UNSPECIFIED WHETHER SCIATICA PRESENT: ICD-10-CM

## 2020-11-17 DIAGNOSIS — J44.9 CHRONIC OBSTRUCTIVE PULMONARY DISEASE, UNSPECIFIED COPD TYPE (HCC): ICD-10-CM

## 2020-11-17 DIAGNOSIS — E78.00 HYPERCHOLESTEROLEMIA: ICD-10-CM

## 2020-11-17 RX ORDER — FLUTICASONE FUROATE, UMECLIDINIUM BROMIDE AND VILANTEROL TRIFENATATE 100; 62.5; 25 UG/1; UG/1; UG/1
POWDER RESPIRATORY (INHALATION)
Qty: 60 EACH | Refills: 3 | Status: SHIPPED | OUTPATIENT
Start: 2020-11-17 | End: 2021-04-29 | Stop reason: SDUPTHER

## 2020-11-17 RX ORDER — SIMVASTATIN 40 MG
TABLET ORAL
Qty: 60 TABLET | Refills: 2 | Status: SHIPPED | OUTPATIENT
Start: 2020-11-17 | End: 2021-06-08

## 2020-11-17 RX ORDER — PANTOPRAZOLE SODIUM 40 MG/1
TABLET, DELAYED RELEASE ORAL
Qty: 60 TABLET | Refills: 2 | Status: SHIPPED | OUTPATIENT
Start: 2020-11-17 | End: 2021-05-09

## 2020-11-17 RX ORDER — CYCLOBENZAPRINE HCL 10 MG
TABLET ORAL
Qty: 60 TABLET | Refills: 2 | Status: SHIPPED | OUTPATIENT
Start: 2020-11-17 | End: 2021-03-11

## 2020-11-18 PROBLEM — C34.11 PRIMARY ADENOCARCINOMA OF UPPER LOBE OF RIGHT LUNG (HCC): Status: ACTIVE | Noted: 2020-11-18

## 2020-11-19 ENCOUNTER — TELEPHONE (OUTPATIENT)
Dept: FAMILY MEDICINE CLINIC | Facility: CLINIC | Age: 75
End: 2020-11-19

## 2020-11-19 DIAGNOSIS — J44.9 COPD, SEVERE (HCC): ICD-10-CM

## 2020-11-19 RX ORDER — FUROSEMIDE 20 MG/1
60 TABLET ORAL DAILY
Qty: 90 TABLET | Refills: 3 | Status: SHIPPED | OUTPATIENT
Start: 2020-11-19 | End: 2020-11-20 | Stop reason: SDUPTHER

## 2020-11-20 ENCOUNTER — RADIATION ONCOLOGY CONSULT (OUTPATIENT)
Dept: RADIATION ONCOLOGY | Facility: HOSPITAL | Age: 75
End: 2020-11-20
Attending: RADIOLOGY
Payer: COMMERCIAL

## 2020-11-20 VITALS
WEIGHT: 270 LBS | HEART RATE: 112 BPM | RESPIRATION RATE: 22 BRPM | TEMPERATURE: 97.7 F | DIASTOLIC BLOOD PRESSURE: 70 MMHG | HEIGHT: 72 IN | SYSTOLIC BLOOD PRESSURE: 140 MMHG | BODY MASS INDEX: 36.57 KG/M2

## 2020-11-20 DIAGNOSIS — J44.9 COPD, SEVERE (HCC): ICD-10-CM

## 2020-11-20 DIAGNOSIS — C34.11 PRIMARY ADENOCARCINOMA OF UPPER LOBE OF RIGHT LUNG (HCC): Primary | ICD-10-CM

## 2020-11-20 PROCEDURE — 99211 OFF/OP EST MAY X REQ PHY/QHP: CPT | Performed by: RADIOLOGY

## 2020-11-20 RX ORDER — FUROSEMIDE 20 MG/1
60 TABLET ORAL DAILY
Qty: 90 TABLET | Refills: 3 | Status: SHIPPED | OUTPATIENT
Start: 2020-11-20 | End: 2021-03-12 | Stop reason: SDUPTHER

## 2020-12-01 ENCOUNTER — RADIATION THERAPY TREATMENT (OUTPATIENT)
Dept: RADIATION ONCOLOGY | Facility: HOSPITAL | Age: 75
End: 2020-12-01
Attending: RADIOLOGY
Payer: COMMERCIAL

## 2020-12-01 PROCEDURE — 77290 THER RAD SIMULAJ FIELD CPLX: CPT | Performed by: RADIOLOGY

## 2020-12-01 PROCEDURE — 77470 SPECIAL RADIATION TREATMENT: CPT | Performed by: RADIOLOGY

## 2020-12-01 PROCEDURE — 77334 RADIATION TREATMENT AID(S): CPT | Performed by: RADIOLOGY

## 2020-12-11 PROCEDURE — 77293 RESPIRATOR MOTION MGMT SIMUL: CPT | Performed by: RADIOLOGY

## 2020-12-11 PROCEDURE — 77334 RADIATION TREATMENT AID(S): CPT | Performed by: RADIOLOGY

## 2020-12-11 PROCEDURE — 77295 3-D RADIOTHERAPY PLAN: CPT | Performed by: RADIOLOGY

## 2020-12-11 PROCEDURE — 77300 RADIATION THERAPY DOSE PLAN: CPT | Performed by: RADIOLOGY

## 2020-12-11 PROCEDURE — 77370 RADIATION PHYSICS CONSULT: CPT | Performed by: RADIOLOGY

## 2020-12-14 ENCOUNTER — PATIENT OUTREACH (OUTPATIENT)
Dept: CASE MANAGEMENT | Facility: HOSPITAL | Age: 75
End: 2020-12-14

## 2020-12-18 ENCOUNTER — PATIENT OUTREACH (OUTPATIENT)
Dept: CASE MANAGEMENT | Facility: HOSPITAL | Age: 75
End: 2020-12-18

## 2020-12-23 DIAGNOSIS — J96.12 CHRONIC RESPIRATORY FAILURE WITH HYPOXIA AND HYPERCAPNIA (HCC): Chronic | ICD-10-CM

## 2020-12-23 DIAGNOSIS — R35.0 URINARY FREQUENCY: ICD-10-CM

## 2020-12-23 DIAGNOSIS — J96.11 CHRONIC RESPIRATORY FAILURE WITH HYPOXIA AND HYPERCAPNIA (HCC): Chronic | ICD-10-CM

## 2020-12-23 DIAGNOSIS — I25.10 CORONARY ARTERY DISEASE INVOLVING NATIVE CORONARY ARTERY OF NATIVE HEART WITHOUT ANGINA PECTORIS: ICD-10-CM

## 2020-12-23 RX ORDER — TAMSULOSIN HYDROCHLORIDE 0.4 MG/1
CAPSULE ORAL
Qty: 60 CAPSULE | Refills: 2 | Status: SHIPPED | OUTPATIENT
Start: 2020-12-23 | End: 2021-08-30

## 2020-12-23 RX ORDER — POTASSIUM CHLORIDE 20 MEQ/1
TABLET, EXTENDED RELEASE ORAL
Qty: 60 TABLET | Refills: 2 | Status: SHIPPED | OUTPATIENT
Start: 2020-12-23 | End: 2021-08-09

## 2020-12-24 ENCOUNTER — APPOINTMENT (OUTPATIENT)
Dept: RADIATION ONCOLOGY | Facility: HOSPITAL | Age: 75
End: 2020-12-24
Attending: RADIOLOGY
Payer: COMMERCIAL

## 2020-12-31 ENCOUNTER — APPOINTMENT (OUTPATIENT)
Dept: RADIATION ONCOLOGY | Facility: HOSPITAL | Age: 75
End: 2020-12-31
Attending: RADIOLOGY
Payer: COMMERCIAL

## 2021-01-25 DIAGNOSIS — I50.33 ACUTE ON CHRONIC DIASTOLIC (CONGESTIVE) HEART FAILURE (HCC): ICD-10-CM

## 2021-01-25 RX ORDER — FUROSEMIDE 40 MG/1
TABLET ORAL
Qty: 60 TABLET | Refills: 4 | Status: ON HOLD | OUTPATIENT
Start: 2021-01-25 | End: 2022-01-30 | Stop reason: SDUPTHER

## 2021-01-27 NOTE — H&P
H&P- Nancy Mishra   1945, 69 y o  male MRN: [id number]    Unit/Bed#: [facility] 928-01 Encounter: [id number]    Primary Care Provider: Kirstin Sanchez MD   Date and time admitted to hospital: 7/27/2020  8:11 PM        * Shortness of breath  Assessment & Plan  · Patient presented with worsening shortness of breath shortly prior to arrival  · CTA PE study negative for pulmonary embolism however with moderate size right pleural effusion and new lung nodule noted  · Additionally COVID-19 nasal swab negative  · Consideration for possible COPD exacerbation and/or CHF exacerbation  Additionally pleural effusion may be playing role  · Currently on home 3L NC  · See plans below for further workup    Lung nodule  Assessment & Plan  · Patient noted with 1 3 cm right lung apex pulmonary nodule  A small nodule was noted on CT chest 06/2018  · Appreciate pulmonology consultation for further recommendations regarding workup    Pleural effusion on right  Assessment & Plan  · Moderate right-sided pleural effusion is noted, consideration for CHF as cause however with changed in size lung nodule noted as well  · Appreciate pulmonology evaluation    Chronic respiratory failure with hypoxia and hypercapnia (HCC)  Assessment & Plan  · Multifactorial in setting of COPD, chronic diastolic heart failure, untreated JACQUELINE, suspect deconditioning    On 3 L NC at this time and remains at baseline  · Continue supplemental oxygen as above and titrate to maintain SaO2 88-94%  · Respiratory protocol and incentive spirometry    Chronic diastolic heart failure (HCC)  Assessment & Plan  Wt Readings from Last 3 Encounters:   07/28/20 120 kg (264 lb 8 8 oz)   11/05/19 117 kg (257 lb)   08/22/19 116 kg (255 lb)     · Increased right-sided pleural effusion is noted  · NT-BNP pro is to be obtained, low threshold to consider IV diuresis  · Monitor daily weights, I/O        Chronic kidney disease, stage 3 (HCC)  Assessment & Plan  · Prior baseline noted Patient presents for Retacrit injection after seeing MD.    WBC (K/mcL)   Date Value   01/27/2021 6.3   01/30/2020 6.5     RBC (mil/mcL)   Date Value   01/27/2021 3.50 (L)   01/30/2020 3.67 (L)     HCT (%)   Date Value   01/27/2021 27.6 (L)   01/30/2020 31.6 (L)     HGB (g/dL)   Date Value   01/27/2021 8.9 (L)   01/30/2020 10.0 (L)     PLT (K/mcL)   Date Value   01/27/2021 182   01/30/2020 215   12/17/2013 248     I am an RN.   Is this medication being given as a supportive medication related to a Treatment Plan (eg. Xgeva, Faslodex, ect.) or any side effects related to the Treatment Plan (eg. Neulasta, Zarxio, ect.)? No.    Pre-Injection Information: Vital signs entered. and Authorization completed if applicable.    Refer to MAR (medication administration record) for type of injection and medication given.  Needle Size: 25 g. 5/8\"  Patient tolerated well: Stable and Follow up appointment scheduled    Dr Simmons is supervising clinician today.   at 1 2-1 4  · Monitor with BMP and will avoid/limit nephrotoxins    COPD, severe (HCC)  Assessment & Plan  · Longstanding history of this, severe at baseline  · ? exacerbation however patient without worsening cough  Markedly improved s/p DuoNeb and IV Solu-Medrol provided in ED  · Will transition to oral steroid for now and continue nebulizers  · Continue Trelegy  · Pulmonology evaluation will be appreciated    Type 2 diabetes mellitus with hyperglycemia, with long-term current use of insulin (HCC)  Assessment & Plan  Lab Results   Component Value Date    HGBA1C 9 5 (H) 03/10/2020       No results for input(s): POCGLU in the last 72 hours  Blood Sugar Average: Last 72 hrs:  ·  follows with [facility] endocrinology regarding this  · For now continue U500 insulin 45 units b i d  Before breakfast/lunch  · Add SSI with Accu-Cheks and carb controlled diet  · Initiate hypoglycemia protocol      VTE Prophylaxis: Heparin  / sequential compression device   Code Status: Level 1 - Full Code per patient  POLST: POLST form is not discussed and not completed at this time  Anticipated Length of Stay:  Patient will be admitted on an Inpatient basis with an anticipated length of stay of  greater than 2 midnights  Justification for Hospital Stay: Please see detailed plans noted above  Chief Complaint:     Shortness of breath  History of Present Illness:  Timmy Scherer Sr  is a 69 y o  male who presented with worsening shortness of breath developing earlier this evening  Has a history of severe COPD, chronic diastolic heart failure, CAD s/p prior stenting x3, chronic hypoxic respiratory failure on 3L NC continuously, type 2 DM, and chronic kidney disease stage 3  Reports some slight increased shortness of breath for the past 2-3 days, however around 1900H the evening of 07/27/2020 noted a marked worsening in shortness of breath prompting presentation    Admits to some increased wheezing and persistence of cough but not productive of sputum  Additionally notes some RUQ abdominal discomfort  Denies any fevers/chills, known sick contacts, chest pain/pressure, worsening edema, increased weight, or dizziness/lightheadedness  He received DuoNeb and IV Solu-Medrol during ED evaluation with improvement in shortness of breath  He underwent CTA PE study which did not reveal pulmonary embolism but did reveal moderate right-sided pleural effusion and right lung apex lung nodule of 1 3 cm  At the time my evaluation, patient is lying in bed in no acute distress, and notes continued improvement in shortness of breath but does not feel fully at baseline  Otherwise endorses above  Intermittently asking for Percocet for back pain during my evaluation      Review of Systems:    Constitutional:  Denies fever or chills   Eyes:  Denies change in visual acuity   HENT:  Denies nasal congestion or sore throat   Respiratory:  Endorses cough, shortness of breath, and wheezing  Cardiovascular:  Denies chest pain or edema   GI:  Denies abdominal pain, nausea, vomiting, bloody stools or diarrhea   :  Denies dysuria   Musculoskeletal:  Denies joint pain but endorses back pain  Integument:  Denies rash   Neurologic:  Denies headache, focal weakness or sensory changes   Endocrine:  Denies polyuria or polydipsia   Lymphatic:  Denies swollen glands   Psychiatric:  Denies depression or anxiety     Past Medical and Surgical History:   Past Medical History:   Diagnosis Date    Abdominal pain     Cardiac disease     [facility]     COPD (chronic obstructive pulmonary disease) (HCC)     Coronary artery disease     Diabetes mellitus ([place] [address] )     GERD (gastroesophageal reflux disease)     Hyperlipidemia     Hypertension     MI (myocardial infarction) ([place] [address] )     with 3 stents    Prostate cancer ([place] [address] )      Past Surgical History:   Procedure Laterality Date    ABDOMINAL SURGERY      exploratory    ANGIOPLASTY      3 stents    APPENDECTOMY      COLONOSCOPY      ESOPHAGOGASTRODUODENOSCOPY N/A 10/2/2017    Procedure: ESOPHAGOGASTRODUODENOSCOPY (EGD); Surgeon: Monique Cunningham MD;  [facility]; Service: Gastroenterology    KNEE CARTILAGE SURGERY      OTHER SURGICAL HISTORY      stent placement    PROSTATE SURGERY      SKIN GRAFT      Basal cell CA back       Meds/Allergies:  Medications Prior to Admission   Medication    aspirin (ECOTRIN LOW STRENGTH) 81 mg EC tablet    cyclobenzaprine (FLEXERIL) 10 mg tablet    furosemide (LASIX) 40 mg tablet    insulin regular (HumuLIN R U-500) 500 units/mL CONCENTRATED injection    metoprolol tartrate (LOPRESSOR) 25 mg tablet    Omega-3 Fatty Acids (FISH OIL) 645 MG CAPS    ONE TOUCH ULTRA TEST test strip    oxyCODONE-acetaminophen (PERCOCET) 7 5-325 MG per tablet    potassium chloride (K-DUR,KLOR-CON) 20 mEq tablet    simvastatin (ZOCOR) 40 mg tablet    tamsulosin (FLOMAX) 0 4 mg    TRELEGY ELLIPTA 100-62 5-25 MCG/INH inhaler    ferrous sulfate 325 (65 Fe) mg tablet    fluticasone (FLONASE) 50 mcg/act nasal spray    Insulin Syringe/Needle U-500 (BD Insulin Syringe U-500) 31G X 6MM 0 5 ML MISC    levalbuterol (XOPENEX) 1 25 mg/3 mL nebulizer solution    pantoprazole (PROTONIX) 40 mg tablet       Allergies:    Allergies   Allergen Reactions    Metformin GI Intolerance     History:  Marital Status: /Civil Union     Substance Use History:   Social History     Substance and Sexual Activity   Alcohol Use Not Currently    Frequency: Never    Binge frequency: Never     Social History     Tobacco Use   Smoking Status Former Smoker    Packs/day: 1 50    Years: 50 00    Pack years: 75 00    Last attempt to quit: 2017    Years since quittin 8   Smokeless Tobacco Never Used     Social History     Substance and Sexual Activity   Drug Use No       Family History:  Family History   Problem Relation Age of Onset    Heart disease Father     Other Father Mesothelioma        Physical Exam:     Vitals:   Blood Pressure: 152/72 (07/28/20 0404)  Pulse: 86 (07/28/20 0404)  Temperature: 98 4 °F (36 9 °C) (07/28/20 0404)  Temp Source: Oral (07/28/20 0404)  Respirations: 20 (07/28/20 0404)  Height: 6' (182 9 cm) (07/28/20 0404)  Weight - Scale: 120 kg (264 lb 8 8 oz) (07/28/20 0404)  SpO2: 93 % (07/28/20 0404)    Constitutional:  Chronically ill appearing, obese, no acute distress, non-toxic appearance   Eyes:  PERRL, conjunctiva normal   HENT:  Atraumatic, external ears normal, nose normal, oropharynx moist, no pharyngeal exudates  Neck- normal range of motion, no tenderness, supple   Respiratory:  No respiratory distress, diminished breath sounds bilaterally but greatest in RLL no rales, no wheezing   Cardiovascular:  Normal rate, normal rhythm, no murmurs, no gallops, no rubs   GI:  Soft, nondistended, normal bowel sounds, nontender, no organomegaly, no mass, no rebound, no guarding   :  No costovertebral angle tenderness   Musculoskeletal:  No edema, no tenderness, no deformities  Back- no tenderness  Integument:  Well hydrated, no rash   Lymphatic:  No lymphadenopathy noted   Neurologic:  Alert &awake, communicative, CN 2-12 normal, normal motor function, normal sensory function, no focal deficits noted   Psychiatric:  Speech and behavior appropriate       Lab Results: I have personally reviewed pertinent reports  Results from last 7 days   Lab Units 07/27/20 2043   WBC Thousand/uL 16 28*   HEMOGLOBIN g/dL 13 5   HEMATOCRIT % 45 1   PLATELETS Thousands/uL 212   NEUTROS PCT % 89*   LYMPHS PCT % 6*   MONOS PCT % 4   EOS PCT % 0     Results from last 7 days   Lab Units 07/27/20 2043   POTASSIUM mmol/L 4 6   CHLORIDE mmol/L 96*   CO2 mmol/L 40*   BUN mg/dL 28*   CREATININE mg/dL 1 51*   CALCIUM mg/dL 10 1   ALK PHOS U/L 102   ALT U/L 28   AST U/L 34           EKG:  Sinus tachycardia, RBBB    Imaging: I have personally reviewed pertinent reports        Cta Ed Chest Pe Study    Result Date: 7/27/2020  Narrative: CTA - CHEST WITH IV CONTRAST - PULMONARY ANGIOGRAM INDICATION:   Dyspnea, chronic  COMPARISON: June 19, 2018 TECHNIQUE: CTA examination of the chest was performed using angiographic technique according to a protocol specifically tailored to evaluate for pulmonary embolism  Axial, sagittal, and coronal 2D reformatted images were created from the source data and  submitted for interpretation  In addition, coronal 3D MIP postprocessing was performed on the acquisition scanner  Radiation dose length product (DLP) for this visit:  1952 27 mGy-cm   This examination, like all CT scans performed in the [facility], was performed utilizing techniques to minimize radiation dose exposure, including the use of iterative reconstruction and automated exposure control  IV Contrast:  85 mL of iohexol (OMNIPAQUE)  FINDINGS: PULMONARY ARTERIAL TREE:  No pulmonary embolus is seen  LUNGS: There is a 1 3 cm right lung apex pulmonary nodule (series 4 image 25)  Emphysematous changes within the lungs are visualized  Consolidation in the right lower lobe is seen  Based on current Fleischner Society 2017 Guidelines on incidental pulmonary nodule, either PET/CT scan evaluation, tissue sampling or short term interval followup non-contrast CT followup (initailly in 3 months) may be considered appropriate  PLEURA:  Moderate right-sided pleural effusion is seen  HEART/GREAT VESSELS:  The heart is enlarged  Coronary artery calcifications are visualized  MEDIASTINUM AND JEREMÍAS:  Unremarkable  CHEST WALL AND LOWER NECK:   Unremarkable  VISUALIZED STRUCTURES IN THE UPPER ABDOMEN nodular hyperplasia of the adrenal glands is seen  Small hiatal hernia is visualized  OSSEOUS STRUCTURES:  No acute fracture or destructive osseous lesion  Impression: No evidence of pulmonary embolus Large pulmonary nodule in the right lung apex  Based on current Fleischner Society 2017 Guidelines on incidental pulmonary nodule, either PET/CT scan evaluation, tissue sampling or short term interval followup non-contrast CT followup (initailly in 3 months) may be considered appropriate  The study was marked in [facility] for immediate notification  Workstation performed: [id number]         ** Please Note: Dragon 360 Dictation voice to text software was used in the creation of this document   **

## 2021-01-29 ENCOUNTER — DOCUMENTATION (OUTPATIENT)
Dept: RADIATION ONCOLOGY | Facility: HOSPITAL | Age: 76
End: 2021-01-29

## 2021-01-29 ENCOUNTER — TELEMEDICINE (OUTPATIENT)
Dept: RADIATION ONCOLOGY | Facility: HOSPITAL | Age: 76
End: 2021-01-29
Attending: RADIOLOGY

## 2021-01-29 DIAGNOSIS — C34.11 PRIMARY ADENOCARCINOMA OF UPPER LOBE OF RIGHT LUNG (HCC): Primary | ICD-10-CM

## 2021-01-29 NOTE — PROGRESS NOTES
Virtual Brief Visit    Assessment/Plan:    Problem List Items Addressed This Visit        Respiratory    Primary adenocarcinoma of upper lobe of right lung ([facility] [address] ) - Primary    Relevant Orders    NM PET CT skull base to mid thigh        Delbert Renee  is a 70y o  year old male with a stage IA 2 right 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 with a history of smoking tobacco for 55 years who recently quit smoking  His workup included a PET-CT study that revealed increase uptake in the right upper lobe lung nodule that is biopsy positive for adenocarcinoma on November 3, 2020  There was no evidence of other hypermetabolic lesions within the chest and no hilar and no mediastinal lymphadenopathy  There was also no disease noted in the neck, abdomen, and pelvis  He has a history of prostate adenocarcinoma treated with prostate seed brachytherapy followed by pelvic radiation therapy performed under my direction at [facility] ending in August 2007  He remains MAURI from his prostate adenocarcinoma  He has multiple medical problems including COPD, CHF, status post myocardial infarction with a stent, and is wheelchair dependent with very marginal lung function such that he is not a surgical candidate  We recommended definitive radiation therapy using stereotactic body radiotherapy to treat his stage IA2 right upper lobe lung adenocarcinoma  He completed treatment at [address] [place] [address] on December 31, 2020  He returns today for follow-up visit  He reports his respiratory status is stable  He remains on home oxygen at 3L  He denies any significant cough and also has no hemoptysis  We recommend that a repeat PET-CT study be done 3 months after the completion treatment to assess response  This will be scheduled for on or after March 31, 2021 and then he will return here for follow-up          Reason for visit is   Chief Complaint   Patient presents with    Virtual Brief Visit Encounter provider Mukesh De Oliveira MD    Provider located at [facility] [id number]    Recent Visits  No visits were found meeting these conditions  Showing recent visits within past 7 days and meeting all other requirements     Today's Visits  Date Type Provider Dept   01/29/21 Telemedicine Mukesh De Oliveira MD [facility]   Showing today's visits and meeting all other requirements     Future Appointments  No visits were found meeting these conditions  Showing future appointments within next 150 days and meeting all other requirements        After connecting through telephone, the patient was identified by name and date of birth  Reginaldo Ruiz Sr  was informed that this is a telemedicine visit and that the visit is being conducted through telephone  My office door was closed  No one else was in the room  He acknowledged consent and understanding of privacy and security of the platform  The patient has agreed to participate and understands he can discontinue the visit at any time  It was my intent to perform this visit via video technology but the patient was not able to do a video connection so the visit was completed via audio telephone only  Patient is aware this is a billable service  Subjective    Reginaldo Ruiz Sr  is a 70 y o  male with a stage IA 2 right 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  HPI   Oncology History   Primary adenocarcinoma of upper lobe of right lung ([facility] [address] )   11/3/2020 Initial Diagnosis    Primary adenocarcinoma of upper lobe of right lung ([facility] [address] )     11/3/2020 Biopsy    Final Diagnosis   A  Lung, Right Upper Lobe, Mass:  - Non small cell carcinoma with morphologic features and immunophenotype compatible with adenocarcinoma of lung   - See note          11/20/2020 -  Cancer Staged    Staging form: Lung, AJCC 8th Edition  - Clinical stage from 11/20/2020: Stage IA2 (cT1b, cN0, cM0) - Signed by Roselia Calderon MD on 11/21/2020 12/18/2020 - 12/31/2020 Radiation    SBRT RUL       Past Medical History:   Diagnosis Date    Abdominal pain     Cardiac disease     [facility]     COPD, severe [facility] )     Coronary artery disease     DDD (degenerative disc disease), lumbar 9/1/2020    Diabetes mellitus ([facility] )     Dyspnea     GERD (gastroesophageal reflux disease)     Hyperlipidemia     Hypertension     MI (myocardial infarction) ([facility] [address] )     with 3 stents    Nodule of apex of right lung     JACQUELINE (obstructive sleep apnea)     Prostate cancer [facility])        Past Surgical History:   Procedure Laterality Date    ABDOMINAL SURGERY      exploratory    ANGIOPLASTY      3 stents    APPENDECTOMY      COLONOSCOPY  2015    CT NEEDLE BIOPSY LUNG  11/3/2020    ESOPHAGOGASTRODUODENOSCOPY N/A 10/2/2017    Procedure: ESOPHAGOGASTRODUODENOSCOPY (EGD); Surgeon: Jana Ramírez MD;  [facility];   Service: Gastroenterology    IR THORACENTESIS  11/3/2020    KNEE CARTILAGE SURGERY      OTHER SURGICAL HISTORY      stent placement    PROSTATE SURGERY      SKIN GRAFT      Basal cell CA back       Current Outpatient Medications   Medication Sig Dispense Refill    aspirin (ECOTRIN LOW STRENGTH) 81 mg EC tablet Take 1 tablet by mouth daily      cyclobenzaprine (FLEXERIL) 10 mg tablet TAKE ONE TABLET BY MOUTH THREE TIMES A DAY 60 tablet 2    ferrous sulfate 325 (65 Fe) mg tablet Take 325 mg by mouth daily with breakfast      fluticasone (FLONASE) 50 mcg/act nasal spray 1 spray into each nostril daily 16 g 0    furosemide (LASIX) 20 mg tablet Take 3 tablets (60 mg total) by mouth daily 90 tablet 3    furosemide (LASIX) 40 mg tablet TAKE ONE TABLET BY MOUTH TWICE A DAY 60 tablet 4    guaiFENesin (MUCINEX) 600 mg 12 hr tablet Take 1 tablet (600 mg total) by mouth every 12 (twelve) hours (Patient not taking: Reported on 11/20/2020) 30 tablet 0    insulin regular (HumuLIN R U-500) 500 units/mL CONCENTRATED injection Inject 0 09 mL (45 Units total) under the skin 2 (two) times a day before breakfast and lunch (Patient taking differently: Inject 45 Units under the skin 2 (two) times a day before meals And 35 units at bedtime) 30 mL 3    Insulin Syringe/Needle U-500 (BD Insulin Syringe U-500) 31G X 6MM 0 5 ML MISC USE 1 SYRINGE THREE TIMES A DAY FOR INJECTING INSULIN 100 each 5    ketoconazole (NIZORAL) 2 % cream ketoconazole 2 % topical cream      levalbuterol (XOPENEX) 1 25 mg/3 mL nebulizer solution USE 1 VIAL IN NEBULIZER THREE TIMES A DAY  5    metoprolol tartrate (LOPRESSOR) 25 mg tablet TAKE ONE TABLET BY MOUTH TWICE A DAY 60 tablet 5    nitroglycerin (NITROSTAT) 0 4 mg SL tablet Place 1 tablet (0 4 mg total) under the tongue every 5 (five) minutes as needed for chest pain (Patient not taking: Reported on 11/20/2020) 30 tablet 5    Omega-3 Fatty Acids (FISH OIL) 645 MG CAPS Take 1 capsule by mouth 2 (two) times a day      ONE TOUCH ULTRA TEST test strip TEST FOUR TIMES A  each 5    oxyCODONE-acetaminophen (PERCOCET) 7 5-325 MG per tablet Take by mouth every 4 (four) hours      pantoprazole (PROTONIX) 40 mg tablet TAKE ONE TABLET BY MOUTH EVERY DAY 60 tablet 2    potassium chloride (K-DUR,KLOR-CON) 20 mEq tablet TAKE 1 TABLET BY MOUTH EVERY DAY 60 tablet 2    simvastatin (ZOCOR) 40 mg tablet TAKE ONE TABLET BY MOUTH EVERY DAY 60 tablet 2    tamsulosin (FLOMAX) 0 4 mg TAKE 1 CAPSULE BY MOUTH EVERY DAY 60 capsule 2    traMADol (ULTRAM) 50 mg tablet tramadol 50 mg tablet      Trelegy Ellipta 100-62 5-25 MCG/INH inhaler INHALE ONE PUFF BY MOUTH EVERY DAY 60 each 3     No current facility-administered medications for this visit  Allergies   Allergen Reactions    Crestor [Rosuvastatin]     Metformin GI Intolerance       Review of Systems    There were no vitals filed for this visit        I spent 15 minutes directly with the patient during this visit    VIRTUAL VISIT [address]  acknowledges that he has consented to an online visit or consultation  He understands that the online visit is based solely on information provided by him, and that, in the absence of a face-to-face physical evaluation by the physician, the diagnosis he receives is both limited and provisional in terms of accuracy and completeness  This is not intended to replace a full medical face-to-face evaluation by the physician  Ida Zendejas understands and accepts these terms

## 2021-02-10 ENCOUNTER — TELEPHONE (OUTPATIENT)
Dept: FAMILY MEDICINE CLINIC | Facility: CLINIC | Age: 76
End: 2021-02-10

## 2021-02-10 DIAGNOSIS — E11.8 TYPE 2 DIABETES MELLITUS WITH COMPLICATION, WITH LONG-TERM CURRENT USE OF INSULIN (HCC): ICD-10-CM

## 2021-02-10 DIAGNOSIS — J96.12 CHRONIC RESPIRATORY FAILURE WITH HYPOXIA AND HYPERCAPNIA (HCC): Chronic | ICD-10-CM

## 2021-02-10 DIAGNOSIS — J96.11 CHRONIC RESPIRATORY FAILURE WITH HYPOXIA AND HYPERCAPNIA (HCC): Chronic | ICD-10-CM

## 2021-02-10 DIAGNOSIS — Z79.4 TYPE 2 DIABETES MELLITUS WITH COMPLICATION, WITH LONG-TERM CURRENT USE OF INSULIN (HCC): ICD-10-CM

## 2021-02-10 NOTE — TELEPHONE ENCOUNTER
Not able to leave message for pt  I looked at his last visit with specialist in 1/2021 and got directions for insulin from that visit   I will keep trying to get in touch with him

## 2021-02-19 ENCOUNTER — TELEPHONE (OUTPATIENT)
Dept: RADIATION ONCOLOGY | Facility: HOSPITAL | Age: 76
End: 2021-02-19

## 2021-03-10 DIAGNOSIS — M54.50 CHRONIC BILATERAL LOW BACK PAIN, UNSPECIFIED WHETHER SCIATICA PRESENT: ICD-10-CM

## 2021-03-10 DIAGNOSIS — G89.29 CHRONIC BILATERAL LOW BACK PAIN, UNSPECIFIED WHETHER SCIATICA PRESENT: ICD-10-CM

## 2021-03-11 RX ORDER — CYCLOBENZAPRINE HCL 10 MG
TABLET ORAL
Qty: 60 TABLET | Refills: 0 | Status: SHIPPED | OUTPATIENT
Start: 2021-03-11 | End: 2021-03-12 | Stop reason: SDUPTHER

## 2021-03-11 NOTE — TELEPHONE ENCOUNTER
Pt will be in tomorrow to see you for a refill of Cyclobenzaprine for back pain    [facility]

## 2021-03-12 ENCOUNTER — OFFICE VISIT (OUTPATIENT)
Dept: FAMILY MEDICINE CLINIC | Facility: CLINIC | Age: 76
End: 2021-03-12
Payer: COMMERCIAL

## 2021-03-12 VITALS
BODY MASS INDEX: 37.79 KG/M2 | WEIGHT: 279 LBS | HEIGHT: 72 IN | SYSTOLIC BLOOD PRESSURE: 136 MMHG | HEART RATE: 79 BPM | DIASTOLIC BLOOD PRESSURE: 78 MMHG | OXYGEN SATURATION: 88 % | TEMPERATURE: 97.1 F

## 2021-03-12 DIAGNOSIS — I25.10 CORONARY ARTERY DISEASE INVOLVING NATIVE CORONARY ARTERY OF NATIVE HEART WITHOUT ANGINA PECTORIS: ICD-10-CM

## 2021-03-12 DIAGNOSIS — I10 ESSENTIAL HYPERTENSION: Primary | ICD-10-CM

## 2021-03-12 DIAGNOSIS — M54.50 CHRONIC BILATERAL LOW BACK PAIN, UNSPECIFIED WHETHER SCIATICA PRESENT: ICD-10-CM

## 2021-03-12 DIAGNOSIS — G47.33 OSA (OBSTRUCTIVE SLEEP APNEA): ICD-10-CM

## 2021-03-12 DIAGNOSIS — Z12.5 SCREENING FOR PROSTATE CANCER: ICD-10-CM

## 2021-03-12 DIAGNOSIS — E78.5 DYSLIPIDEMIA: Chronic | ICD-10-CM

## 2021-03-12 DIAGNOSIS — I50.32 CHRONIC DIASTOLIC HEART FAILURE (HCC): ICD-10-CM

## 2021-03-12 DIAGNOSIS — E66.01 OBESITY, MORBID (HCC): ICD-10-CM

## 2021-03-12 DIAGNOSIS — Z79.4 TYPE 2 DIABETES MELLITUS WITH HYPERGLYCEMIA, WITH LONG-TERM CURRENT USE OF INSULIN (HCC): Chronic | ICD-10-CM

## 2021-03-12 DIAGNOSIS — E11.65 TYPE 2 DIABETES MELLITUS WITH HYPERGLYCEMIA, WITH LONG-TERM CURRENT USE OF INSULIN (HCC): Chronic | ICD-10-CM

## 2021-03-12 DIAGNOSIS — M51.36 DDD (DEGENERATIVE DISC DISEASE), LUMBAR: ICD-10-CM

## 2021-03-12 DIAGNOSIS — C34.11 PRIMARY ADENOCARCINOMA OF UPPER LOBE OF RIGHT LUNG (HCC): ICD-10-CM

## 2021-03-12 DIAGNOSIS — I73.9 PAD (PERIPHERAL ARTERY DISEASE) (HCC): ICD-10-CM

## 2021-03-12 DIAGNOSIS — G89.29 CHRONIC BILATERAL LOW BACK PAIN, UNSPECIFIED WHETHER SCIATICA PRESENT: ICD-10-CM

## 2021-03-12 DIAGNOSIS — J44.9 COPD, SEVERE (HCC): ICD-10-CM

## 2021-03-12 PROBLEM — D64.9 ANEMIA: Status: RESOLVED | Noted: 2020-09-01 | Resolved: 2021-03-12

## 2021-03-12 LAB — SL AMB POCT HEMOGLOBIN AIC: 9.9 (ref ?–6.5)

## 2021-03-12 PROCEDURE — 83036 HEMOGLOBIN GLYCOSYLATED A1C: CPT | Performed by: FAMILY MEDICINE

## 2021-03-12 PROCEDURE — 99215 OFFICE O/P EST HI 40 MIN: CPT | Performed by: FAMILY MEDICINE

## 2021-03-12 PROCEDURE — 36416 COLLJ CAPILLARY BLOOD SPEC: CPT | Performed by: FAMILY MEDICINE

## 2021-03-12 RX ORDER — CYCLOBENZAPRINE HCL 10 MG
10 TABLET ORAL 3 TIMES DAILY
Qty: 60 TABLET | Refills: 0 | Status: SHIPPED | OUTPATIENT
Start: 2021-03-12 | End: 2021-05-09

## 2021-03-12 NOTE — ASSESSMENT & PLAN NOTE
A1C done today and was 9 9  Pt told to increase insulin to 50 U  With breakfast and lunch and 40 U with dinner  Pt to schedule eye exam  Foot exam done       Lab Results   Component Value Date    HGBA1C 9 5 (H) 03/10/2020

## 2021-03-12 NOTE — PROGRESS NOTES
BMI Counseling: Body mass index is 37 84 kg/m²  The BMI is above normal  Nutrition recommendations include decreasing portion sizes, encouraging healthy choices of fruits and vegetables, consuming healthier snacks and moderation in carbohydrate intake  Exercise recommendations include exercising 3-5 times per week  No pharmacotherapy was ordered  Assessment/Plan:         Problem List Items Addressed This Visit        Endocrine    Type 2 diabetes mellitus with hyperglycemia, with long-term current use of insulin (HCC) (Chronic)     A1C done today and was 9 9  Pt told to increase insulin to 50 U  With breakfast and lunch and 40 U with dinner  Pt to schedule eye exam  Foot exam done  Lab Results   Component Value Date    HGBA1C 9 5 (H) 03/10/2020            Relevant Orders    POCT hemoglobin A1c    Ambulatory referral to Ophthalmology       Respiratory    Primary adenocarcinoma of upper lobe of right lung ([facility] )     Pt had radiation treatments and for PET scan on 3/31  JACQUELINE (obstructive sleep apnea)     Pt uses O2 while sleeping  Does not use CPAP  COPD, severe ([facility] [address] )     Pt sees Dr Lizz Power and on oxygen at home  Continue trelegy qd  Cardiovascular and Mediastinum    PAD (peripheral artery disease) ([facility] )     Will check arterial duplex  Relevant Orders    VAS lower limb arterial duplex, complete bilateral    Essential hypertension - Primary     BP ok on current meds  Relevant Orders    Basic metabolic panel    CBC and differential    Chronic diastolic heart failure ([facility] [address] )     Wt Readings from Last 3 Encounters:   03/12/21 127 kg (279 lb)   11/20/20 122 kg (270 lb)   11/03/20 122 kg (270 lb)     Wt stable  Continue current meds per Cardiology  CAD (coronary artery disease)     No chest pain  Continue current meds  Sees Dr Lenny Johnson in April 2021  Musculoskeletal and Integument    DDD (degenerative disc disease), lumbar     Pt has chronic pain  Has seen Pain Mgmt  Continue current meds  Other    Obesity, morbid (HCC)    Dyslipidemia (Chronic)     Recheck lipids and LFTs  Relevant Orders    Lipid panel    Hepatic function panel      Other Visit Diagnoses     Chronic bilateral low back pain, unspecified whether sciatica present        Relevant Medications    cyclobenzaprine (FLEXERIL) 10 mg tablet    Screening for prostate cancer        Relevant Orders    PSA, Total Screen            Subjective:      Patient ID: Radha Tompkins  is a 70 y o  male  Pt here for f/u HTN, HL, COPD, Adenocarcinoma of lung, JACQUELINE, DM, CAD, Back Pain  Pt doing ok  No recent chest pain  Sees Cardiology Dr Marshia Aschoff  Gets sob  Sees Pulmonary Dr Hipolito Oakley  Pt was diagnosed with lung cancer and had radiation treatments by Dr Ranjan Canales  BP has been ok  Sugars up and down  Not great with diet  No regular exercise  No recent eye exam  Has numbness in feet  Wears oxygen at home and when sleeps  The following portions of the patient's history were reviewed and updated as appropriate:   Past Medical History:  He has a past medical history of Abdominal pain, Cardiac disease, CHF (congestive heart failure) ([facility] [address] ), COPD, severe ([facility] [address] ), Coronary artery disease, DDD (degenerative disc disease), lumbar (9/1/2020), Diabetes mellitus ([facility] [address] ), Dyspnea, GERD (gastroesophageal reflux disease), Hyperlipidemia, Hypertension, MI (myocardial infarction) ([facility] [address] ), Nodule of apex of right lung, JACQUELINE (obstructive sleep apnea), and Prostate cancer ([facility] )  ,  _______________________________________________________________________  Medical Problems:  does not have any pertinent problems on file ,  _______________________________________________________________________  Past Surgical History:   has a past surgical history that includes Prostate surgery; Abdominal surgery; Angioplasty; Esophagogastroduodenoscopy (N/A, 10/2/2017); Appendectomy; Skin graft; Knee cartilage surgery;  Other surgical history; Colonoscopy (2015); IR thoracentesis (11/3/2020); and CT needle biopsy lung (11/3/2020)  ,  _______________________________________________________________________  Family History:  family history includes Heart disease in his father; Other in his father ,  _______________________________________________________________________  Social History:   reports that he quit smoking about 7 months ago  He has a 75 00 pack-year smoking history  He has never used smokeless tobacco  He reports previous alcohol use  He reports that he does not use drugs  ,  _______________________________________________________________________  Allergies:  is allergic to crestor [rosuvastatin] and metformin     _______________________________________________________________________  Current Outpatient Medications   Medication Sig Dispense Refill    aspirin (ECOTRIN LOW STRENGTH) 81 mg EC tablet Take 1 tablet by mouth daily      furosemide (LASIX) 40 mg tablet TAKE ONE TABLET BY MOUTH TWICE A DAY 60 tablet 4    insulin regular (HumuLIN R U-500) 500 units/mL CONCENTRATED injection Inject 0 09 mL (45 Units total) under the skin 2 (two) times a day before meals And 35 units at bedtime      Insulin Syringe/Needle U-500 (BD Insulin Syringe U-500) 31G X 6MM 0 5 ML MISC USE 1 SYRINGE THREE TIMES A DAY FOR INJECTING INSULIN 100 each 5    levalbuterol (XOPENEX) 1 25 mg/3 mL nebulizer solution USE 1 VIAL IN NEBULIZER THREE TIMES A DAY  5    metoprolol tartrate (LOPRESSOR) 25 mg tablet TAKE ONE TABLET BY MOUTH TWICE A DAY 60 tablet 5    Omega-3 Fatty Acids (FISH OIL) 645 MG CAPS Take 1 capsule by mouth 2 (two) times a day      ONE TOUCH ULTRA TEST test strip TEST FOUR TIMES A  each 5    pantoprazole (PROTONIX) 40 mg tablet TAKE ONE TABLET BY MOUTH EVERY DAY 60 tablet 2    potassium chloride (K-DUR,KLOR-CON) 20 mEq tablet TAKE 1 TABLET BY MOUTH EVERY DAY 60 tablet 2    simvastatin (ZOCOR) 40 mg tablet TAKE ONE TABLET BY MOUTH EVERY DAY 60 tablet 2    tamsulosin (FLOMAX) 0 4 mg TAKE 1 CAPSULE BY MOUTH EVERY DAY 60 capsule 2    Trelegy Ellipta 100-62 5-25 MCG/INH inhaler INHALE ONE PUFF BY MOUTH EVERY DAY 60 each 3    cyclobenzaprine (FLEXERIL) 10 mg tablet Take 1 tablet (10 mg total) by mouth 3 (three) times a day 60 tablet 0    ferrous sulfate 325 (65 Fe) mg tablet Take 325 mg by mouth daily with breakfast      ketoconazole (NIZORAL) 2 % cream ketoconazole 2 % topical cream      nitroglycerin (NITROSTAT) 0 4 mg SL tablet Place 1 tablet (0 4 mg total) under the tongue every 5 (five) minutes as needed for chest pain (Patient not taking: Reported on 11/20/2020) 30 tablet 5    oxyCODONE-acetaminophen (PERCOCET) 7 5-325 MG per tablet Take by mouth every 4 (four) hours      traMADol (ULTRAM) 50 mg tablet tramadol 50 mg tablet       No current facility-administered medications for this visit       _______________________________________________________________________  Review of Systems   Constitutional: Negative for fatigue and unexpected weight change  Respiratory: Positive for shortness of breath  Negative for cough  Cardiovascular: Negative for chest pain  Gastrointestinal: Negative for abdominal pain, constipation, diarrhea and vomiting  Musculoskeletal: Positive for back pain  Negative for arthralgias  Neurological: Positive for weakness and numbness  Negative for dizziness and headaches  Psychiatric/Behavioral: Negative for dysphoric mood  The patient is not nervous/anxious  Objective:  Vitals:    03/12/21 1121   BP: 140/70   BP Location: Left arm   Patient Position: Sitting   Cuff Size: Standard   Pulse: 79   Temp: (!) 97 1 °F (36 2 °C)   TempSrc: Temporal   SpO2: (!) 88%   Weight: 127 kg (279 lb)   Height: 6' (1 829 m)     Body mass index is 37 84 kg/m²  Physical Exam  Vitals signs and nursing note reviewed  Constitutional:       Appearance: Normal appearance  He is well-developed and normal weight  Neck:      Musculoskeletal: Normal range of motion and neck supple  Thyroid: No thyromegaly  Cardiovascular:      Rate and Rhythm: Normal rate and regular rhythm  Pulses: Pulses are weak  Heart sounds: Normal heart sounds  No murmur  Pulmonary:      Effort: Pulmonary effort is normal  No respiratory distress  Breath sounds: Normal breath sounds  No wheezing  Musculoskeletal:      Right lower leg: No edema  Left lower leg: No edema  Comments: TTP lumbar area b/l   Feet:      Right foot:      Skin integrity: Dry skin present  No ulcer, skin breakdown, erythema, warmth or callus  Left foot:      Skin integrity: Dry skin present  No ulcer, skin breakdown, erythema, warmth or callus  Lymphadenopathy:      Cervical: No cervical adenopathy  Neurological:      Mental Status: He is alert and oriented to person, place, and time  Cranial Nerves: No cranial nerve deficit  Psychiatric:         Mood and Affect: Mood normal          Behavior: Behavior normal          Thought Content: Thought content normal          Judgment: Judgment normal        Patient's shoes and socks removed  Right Foot/Ankle   Right Foot Inspection  Skin Exam: skin normal, skin intact and dry skin no warmth, no callus, no erythema, no maceration, no abnormal color, no pre-ulcer, no ulcer and no callus                            Sensory     Proprioception: diminished     Vascular  Capillary refills: elevated      Left Foot/Ankle  Left Foot Inspection  Skin Exam: skin normal, skin intact and dry skinno warmth, no erythema, no maceration, normal color, no pre-ulcer, no ulcer and no callus                                         Sensory     Proprioception: diminished    Vascular  Capillary refills: elevated    Assign Risk Category:  No deformity present;  Loss of protective sensation; Weak pulses       Risk: 2

## 2021-03-12 NOTE — ASSESSMENT & PLAN NOTE
Wt Readings from Last 3 Encounters:   03/12/21 127 kg (279 lb)   11/20/20 122 kg (270 lb)   11/03/20 122 kg (270 lb)     Wt stable  Continue current meds per Cardiology

## 2021-03-15 ENCOUNTER — TELEPHONE (OUTPATIENT)
Dept: FAMILY MEDICINE CLINIC | Facility: CLINIC | Age: 76
End: 2021-03-15

## 2021-03-15 ENCOUNTER — TELEPHONE (OUTPATIENT)
Dept: PULMONOLOGY | Facility: CLINIC | Age: 76
End: 2021-03-15

## 2021-03-15 NOTE — TELEPHONE ENCOUNTER
Established patient needing pre-op clearance  Surgery: Dental Extraction  Surgery date: TBS once cleared  Last seen by us: 9/25/20  On oxygen: Yes  Kind of Sedation: Local  Length of surgery: 30-45 minutes  Surgeon: Dr Nolan Chau  Office contact: Paradise Gordon [phone]  Form/Office Note: Form  Fax: [phone]    Patient scheduled for 4/29      Patient said he is not going back to this dentist

## 2021-03-17 ENCOUNTER — TELEPHONE (OUTPATIENT)
Dept: FAMILY MEDICINE CLINIC | Facility: CLINIC | Age: 76
End: 2021-03-17

## 2021-03-17 DIAGNOSIS — R05.9 COUGH: Primary | ICD-10-CM

## 2021-03-17 RX ORDER — AZITHROMYCIN 250 MG/1
500 TABLET, FILM COATED ORAL EVERY 24 HOURS
COMMUNITY
End: 2021-03-17 | Stop reason: CLARIF

## 2021-03-17 RX ORDER — AZITHROMYCIN 250 MG/1
TABLET, FILM COATED ORAL
Qty: 6 TABLET | Refills: 0 | Status: SHIPPED | OUTPATIENT
Start: 2021-03-17 | End: 2021-03-22

## 2021-03-17 RX ORDER — AZITHROMYCIN 250 MG/1
500 TABLET, FILM COATED ORAL EVERY 24 HOURS
COMMUNITY
End: 2021-03-17 | Stop reason: SDUPTHER

## 2021-03-17 NOTE — TELEPHONE ENCOUNTER
Patient called M requesting antibiotic for cold symptoms states has a runny nose, patient ended reaching [facility] and does not want to be schedule just requested a medication   ND

## 2021-04-02 ENCOUNTER — TELEPHONE (OUTPATIENT)
Dept: FAMILY MEDICINE CLINIC | Facility: CLINIC | Age: 76
End: 2021-04-02

## 2021-04-02 ENCOUNTER — HOSPITAL ENCOUNTER (OUTPATIENT)
Dept: RADIOLOGY | Age: 76
Discharge: HOME | End: 2021-04-02
Payer: COMMERCIAL

## 2021-04-02 DIAGNOSIS — E11.9 DIABETES MELLITUS WITHOUT COMPLICATION (HCC): Primary | ICD-10-CM

## 2021-04-02 DIAGNOSIS — C34.11 PRIMARY ADENOCARCINOMA OF UPPER LOBE OF RIGHT LUNG (HCC): ICD-10-CM

## 2021-04-02 DIAGNOSIS — J06.9 UPPER RESPIRATORY INFECTION, ACUTE: Primary | ICD-10-CM

## 2021-04-02 LAB — GLUCOSE SERPL-MCNC: 262 MG/DL (ref 65–140)

## 2021-04-02 PROCEDURE — 82948 REAGENT STRIP/BLOOD GLUCOSE: CPT

## 2021-04-02 RX ORDER — AMOXICILLIN 875 MG/1
875 TABLET, COATED ORAL 2 TIMES DAILY
COMMUNITY
End: 2021-04-02 | Stop reason: SDUPTHER

## 2021-04-02 RX ORDER — AMOXICILLIN 875 MG/1
875 TABLET, COATED ORAL 2 TIMES DAILY
Qty: 14 TABLET | Refills: 0 | Status: SHIPPED | OUTPATIENT
Start: 2021-04-02 | End: 2021-04-09

## 2021-04-02 NOTE — TELEPHONE ENCOUNTER
[address], Tony Benson was is here for a pet scan but his sugar is too high  She thinks he needs a new glucose machine  They are working on adjusting his diet  And they cannot do the test until he is under control with his levels  Annie Llamas - nurse at [facility] [phone]  She is at work till 3 today if you want to call her      Annie Llamas said they will work something out and then get Tony Marcelino in for his Ct scan

## 2021-04-02 NOTE — TELEPHONE ENCOUNTER
I faxed the order for a new meter  Patient finished his zpak and still is not completely rangel  Wants to know if he can have amoxicillin   That worked better for him the last time

## 2021-04-14 ENCOUNTER — HOSPITAL ENCOUNTER (INPATIENT)
Facility: HOSPITAL | Age: 76
LOS: 5 days | Discharge: HOME | DRG: 181 | End: 2021-04-19
Attending: EMERGENCY MEDICINE | Admitting: INTERNAL MEDICINE
Payer: COMMERCIAL

## 2021-04-14 ENCOUNTER — APPOINTMENT (EMERGENCY)
Dept: RADIOLOGY | Facility: HOSPITAL | Age: 76
DRG: 181 | End: 2021-04-14
Payer: COMMERCIAL

## 2021-04-14 DIAGNOSIS — J44.1 COPD WITH ACUTE EXACERBATION (HCC): Primary | ICD-10-CM

## 2021-04-14 DIAGNOSIS — E11.65 TYPE 2 DIABETES MELLITUS WITH HYPERGLYCEMIA, WITH LONG-TERM CURRENT USE OF INSULIN (HCC): Chronic | ICD-10-CM

## 2021-04-14 DIAGNOSIS — Z79.4 TYPE 2 DIABETES MELLITUS WITH HYPERGLYCEMIA, WITH LONG-TERM CURRENT USE OF INSULIN (HCC): Chronic | ICD-10-CM

## 2021-04-14 DIAGNOSIS — J90 RECURRENT RIGHT PLEURAL EFFUSION: ICD-10-CM

## 2021-04-14 LAB
ALBUMIN SERPL BCP-MCNC: 3 G/DL (ref 3.5–5)
ALP SERPL-CCNC: 89 U/L (ref 46–116)
ALT SERPL W P-5'-P-CCNC: 16 U/L (ref 12–78)
ANION GAP SERPL CALCULATED.3IONS-SCNC: 2 MMOL/L (ref 4–13)
AST SERPL W P-5'-P-CCNC: 21 U/L (ref 5–45)
ATRIAL RATE: 182 BPM
BACTERIA UR QL AUTO: NORMAL /HPF
BASOPHILS # BLD AUTO: 0.03 THOUSANDS/ΜL (ref 0–0.1)
BASOPHILS NFR BLD AUTO: 0 % (ref 0–1)
BILIRUB SERPL-MCNC: 0.29 MG/DL (ref 0.2–1)
BILIRUB UR QL STRIP: NEGATIVE
BUN SERPL-MCNC: 18 MG/DL (ref 5–25)
CALCIUM ALBUM COR SERPL-MCNC: 9.6 MG/DL (ref 8.3–10.1)
CALCIUM SERPL-MCNC: 8.8 MG/DL (ref 8.3–10.1)
CHLORIDE SERPL-SCNC: 97 MMOL/L (ref 100–108)
CLARITY UR: CLEAR
CO2 SERPL-SCNC: 35 MMOL/L (ref 21–32)
COLOR UR: YELLOW
CREAT SERPL-MCNC: 1.26 MG/DL (ref 0.6–1.3)
EOSINOPHIL # BLD AUTO: 0.08 THOUSAND/ΜL (ref 0–0.61)
EOSINOPHIL NFR BLD AUTO: 1 % (ref 0–6)
ERYTHROCYTE [DISTWIDTH] IN BLOOD BY AUTOMATED COUNT: 16.9 % (ref 11.6–15.1)
EST. AVERAGE GLUCOSE BLD GHB EST-MCNC: 217 MG/DL
FLUAV RNA RESP QL NAA+PROBE: NEGATIVE
FLUBV RNA RESP QL NAA+PROBE: NEGATIVE
GFR SERPL CREATININE-BSD FRML MDRD: 56 ML/MIN/1.73SQ M
GLUCOSE SERPL-MCNC: 319 MG/DL (ref 65–140)
GLUCOSE UR STRIP-MCNC: ABNORMAL MG/DL
HBA1C MFR BLD: 9.2 %
HCT VFR BLD AUTO: 37.7 % (ref 36.5–49.3)
HGB BLD-MCNC: 11.3 G/DL (ref 12–17)
HGB UR QL STRIP.AUTO: NEGATIVE
HYALINE CASTS #/AREA URNS LPF: NORMAL /LPF
IMM GRANULOCYTES # BLD AUTO: 0.04 THOUSAND/UL (ref 0–0.2)
IMM GRANULOCYTES NFR BLD AUTO: 1 % (ref 0–2)
KETONES UR STRIP-MCNC: NEGATIVE MG/DL
LEUKOCYTE ESTERASE UR QL STRIP: ABNORMAL
LYMPHOCYTES # BLD AUTO: 0.74 THOUSANDS/ΜL (ref 0.6–4.47)
LYMPHOCYTES NFR BLD AUTO: 9 % (ref 14–44)
MCH RBC QN AUTO: 24.6 PG (ref 26.8–34.3)
MCHC RBC AUTO-ENTMCNC: 30 G/DL (ref 31.4–37.4)
MCV RBC AUTO: 82 FL (ref 82–98)
MONOCYTES # BLD AUTO: 0.53 THOUSAND/ΜL (ref 0.17–1.22)
MONOCYTES NFR BLD AUTO: 6 % (ref 4–12)
NEUTROPHILS # BLD AUTO: 7.02 THOUSANDS/ΜL (ref 1.85–7.62)
NEUTS SEG NFR BLD AUTO: 83 % (ref 43–75)
NITRITE UR QL STRIP: NEGATIVE
NON-SQ EPI CELLS URNS QL MICRO: NORMAL /HPF
NRBC BLD AUTO-RTO: 0 /100 WBCS
PH UR STRIP.AUTO: 5.5 [PH]
PLATELET # BLD AUTO: 147 THOUSANDS/UL (ref 149–390)
PMV BLD AUTO: 10.6 FL (ref 8.9–12.7)
POTASSIUM SERPL-SCNC: 4.9 MMOL/L (ref 3.5–5.3)
PROT SERPL-MCNC: 7.7 G/DL (ref 6.4–8.2)
PROT UR STRIP-MCNC: ABNORMAL MG/DL
QRS AXIS: 105 DEGREES
QRSD INTERVAL: 132 MS
QT INTERVAL: 390 MS
QTC INTERVAL: 532 MS
RBC # BLD AUTO: 4.59 MILLION/UL (ref 3.88–5.62)
RBC #/AREA URNS AUTO: NORMAL /HPF
RSV RNA RESP QL NAA+PROBE: NEGATIVE
SARS-COV-2 RNA RESP QL NAA+PROBE: NEGATIVE
SODIUM SERPL-SCNC: 134 MMOL/L (ref 136–145)
SP GR UR STRIP.AUTO: 1.03 (ref 1–1.03)
T WAVE AXIS: 35 DEGREES
TROPONIN I SERPL-MCNC: <0.02 NG/ML
UROBILINOGEN UR QL STRIP.AUTO: 1 E.U./DL
VENTRICULAR RATE: 112 BPM
WBC # BLD AUTO: 8.44 THOUSAND/UL (ref 4.31–10.16)
WBC #/AREA URNS AUTO: NORMAL /HPF

## 2021-04-14 PROCEDURE — 99223 1ST HOSP IP/OBS HIGH 75: CPT | Performed by: INTERNAL MEDICINE

## 2021-04-14 PROCEDURE — 94644 CONT INHLJ TX 1ST HOUR: CPT

## 2021-04-14 PROCEDURE — 84484 ASSAY OF TROPONIN QUANT: CPT | Performed by: EMERGENCY MEDICINE

## 2021-04-14 PROCEDURE — 85025 COMPLETE CBC W/AUTO DIFF WBC: CPT | Performed by: EMERGENCY MEDICINE

## 2021-04-14 PROCEDURE — 99285 EMERGENCY DEPT VISIT HI MDM: CPT

## 2021-04-14 PROCEDURE — 0241U HB NFCT DS VIR RESP RNA 4 TRGT: CPT | Performed by: EMERGENCY MEDICINE

## 2021-04-14 PROCEDURE — 36415 COLL VENOUS BLD VENIPUNCTURE: CPT

## 2021-04-14 PROCEDURE — 93005 ELECTROCARDIOGRAM TRACING: CPT

## 2021-04-14 PROCEDURE — 94664 DEMO&/EVAL PT USE INHALER: CPT

## 2021-04-14 PROCEDURE — G1004 CDSM NDSC: HCPCS

## 2021-04-14 PROCEDURE — 81001 URINALYSIS AUTO W/SCOPE: CPT | Performed by: INTERNAL MEDICINE

## 2021-04-14 PROCEDURE — 71045 X-RAY EXAM CHEST 1 VIEW: CPT

## 2021-04-14 PROCEDURE — 99285 EMERGENCY DEPT VISIT HI MDM: CPT | Performed by: EMERGENCY MEDICINE

## 2021-04-14 PROCEDURE — 93010 ELECTROCARDIOGRAM REPORT: CPT | Performed by: INTERNAL MEDICINE

## 2021-04-14 PROCEDURE — 94760 N-INVAS EAR/PLS OXIMETRY 1: CPT

## 2021-04-14 PROCEDURE — 71275 CT ANGIOGRAPHY CHEST: CPT

## 2021-04-14 PROCEDURE — 96374 THER/PROPH/DIAG INJ IV PUSH: CPT

## 2021-04-14 PROCEDURE — 80053 COMPREHEN METABOLIC PANEL: CPT | Performed by: EMERGENCY MEDICINE

## 2021-04-14 PROCEDURE — 83036 HEMOGLOBIN GLYCOSYLATED A1C: CPT | Performed by: INTERNAL MEDICINE

## 2021-04-14 RX ORDER — DOCUSATE SODIUM 100 MG/1
100 CAPSULE, LIQUID FILLED ORAL 2 TIMES DAILY PRN
Status: DISCONTINUED | OUTPATIENT
Start: 2021-04-14 | End: 2021-04-19 | Stop reason: HOSPADM

## 2021-04-14 RX ORDER — SODIUM CHLORIDE FOR INHALATION 0.9 %
3 VIAL, NEBULIZER (ML) INHALATION ONCE
Status: COMPLETED | OUTPATIENT
Start: 2021-04-14 | End: 2021-04-14

## 2021-04-14 RX ORDER — FERROUS SULFATE 325(65) MG
325 TABLET ORAL
Status: DISCONTINUED | OUTPATIENT
Start: 2021-04-15 | End: 2021-04-19 | Stop reason: HOSPADM

## 2021-04-14 RX ORDER — GUAIFENESIN 600 MG
600 TABLET, EXTENDED RELEASE 12 HR ORAL 2 TIMES DAILY
Status: DISCONTINUED | OUTPATIENT
Start: 2021-04-14 | End: 2021-04-19 | Stop reason: HOSPADM

## 2021-04-14 RX ORDER — POTASSIUM CHLORIDE 20 MEQ/1
20 TABLET, EXTENDED RELEASE ORAL DAILY
Status: DISCONTINUED | OUTPATIENT
Start: 2021-04-15 | End: 2021-04-15

## 2021-04-14 RX ORDER — LEVALBUTEROL 1.25 MG/.5ML
1.25 SOLUTION, CONCENTRATE RESPIRATORY (INHALATION)
Status: DISCONTINUED | OUTPATIENT
Start: 2021-04-15 | End: 2021-04-19 | Stop reason: HOSPADM

## 2021-04-14 RX ORDER — ACETAMINOPHEN 325 MG/1
650 TABLET ORAL EVERY 6 HOURS PRN
Status: DISCONTINUED | OUTPATIENT
Start: 2021-04-14 | End: 2021-04-19 | Stop reason: HOSPADM

## 2021-04-14 RX ORDER — LEVALBUTEROL 1.25 MG/.5ML
1.25 SOLUTION, CONCENTRATE RESPIRATORY (INHALATION) EVERY 6 HOURS PRN
Status: DISCONTINUED | OUTPATIENT
Start: 2021-04-14 | End: 2021-04-19 | Stop reason: HOSPADM

## 2021-04-14 RX ORDER — PRAVASTATIN SODIUM 80 MG/1
80 TABLET ORAL
Status: DISCONTINUED | OUTPATIENT
Start: 2021-04-15 | End: 2021-04-19 | Stop reason: HOSPADM

## 2021-04-14 RX ORDER — ASPIRIN 81 MG/1
81 TABLET ORAL DAILY
Status: DISCONTINUED | OUTPATIENT
Start: 2021-04-15 | End: 2021-04-19 | Stop reason: HOSPADM

## 2021-04-14 RX ORDER — SODIUM CHLORIDE FOR INHALATION 0.9 %
3 VIAL, NEBULIZER (ML) INHALATION
Status: DISCONTINUED | OUTPATIENT
Start: 2021-04-15 | End: 2021-04-14

## 2021-04-14 RX ORDER — TAMSULOSIN HYDROCHLORIDE 0.4 MG/1
0.4 CAPSULE ORAL
Status: DISCONTINUED | OUTPATIENT
Start: 2021-04-15 | End: 2021-04-19 | Stop reason: HOSPADM

## 2021-04-14 RX ORDER — TRAMADOL HYDROCHLORIDE 50 MG/1
50 TABLET ORAL EVERY 6 HOURS PRN
Status: DISCONTINUED | OUTPATIENT
Start: 2021-04-14 | End: 2021-04-15

## 2021-04-14 RX ORDER — FUROSEMIDE 40 MG/1
40 TABLET ORAL 2 TIMES DAILY
Status: DISCONTINUED | OUTPATIENT
Start: 2021-04-14 | End: 2021-04-19 | Stop reason: HOSPADM

## 2021-04-14 RX ORDER — FLUTICASONE FUROATE AND VILANTEROL 100; 25 UG/1; UG/1
1 POWDER RESPIRATORY (INHALATION) DAILY
Status: DISCONTINUED | OUTPATIENT
Start: 2021-04-15 | End: 2021-04-15

## 2021-04-14 RX ORDER — OXYCODONE HYDROCHLORIDE AND ACETAMINOPHEN 5; 325 MG/1; MG/1
1.5 TABLET ORAL EVERY 6 HOURS PRN
Status: DISCONTINUED | OUTPATIENT
Start: 2021-04-14 | End: 2021-04-19 | Stop reason: HOSPADM

## 2021-04-14 RX ORDER — LEVALBUTEROL 1.25 MG/.5ML
1.25 SOLUTION, CONCENTRATE RESPIRATORY (INHALATION) EVERY 6 HOURS PRN
Status: DISCONTINUED | OUTPATIENT
Start: 2021-04-14 | End: 2021-04-14

## 2021-04-14 RX ORDER — ONDANSETRON 2 MG/ML
4 INJECTION INTRAMUSCULAR; INTRAVENOUS EVERY 6 HOURS PRN
Status: DISCONTINUED | OUTPATIENT
Start: 2021-04-14 | End: 2021-04-19 | Stop reason: HOSPADM

## 2021-04-14 RX ORDER — PANTOPRAZOLE SODIUM 40 MG/1
40 TABLET, DELAYED RELEASE ORAL
Status: DISCONTINUED | OUTPATIENT
Start: 2021-04-15 | End: 2021-04-19 | Stop reason: HOSPADM

## 2021-04-14 RX ORDER — METHYLPREDNISOLONE SODIUM SUCCINATE 40 MG/ML
40 INJECTION, POWDER, LYOPHILIZED, FOR SOLUTION INTRAMUSCULAR; INTRAVENOUS EVERY 8 HOURS
Status: DISCONTINUED | OUTPATIENT
Start: 2021-04-14 | End: 2021-04-16

## 2021-04-14 RX ORDER — SODIUM CHLORIDE FOR INHALATION 0.9 %
3 VIAL, NEBULIZER (ML) INHALATION EVERY 6 HOURS PRN
Status: DISCONTINUED | OUTPATIENT
Start: 2021-04-14 | End: 2021-04-19 | Stop reason: HOSPADM

## 2021-04-14 RX ORDER — CHLORAL HYDRATE 500 MG
1000 CAPSULE ORAL 2 TIMES DAILY
Status: DISCONTINUED | OUTPATIENT
Start: 2021-04-15 | End: 2021-04-19 | Stop reason: HOSPADM

## 2021-04-14 RX ORDER — MAGNESIUM HYDROXIDE/ALUMINUM HYDROXICE/SIMETHICONE 120; 1200; 1200 MG/30ML; MG/30ML; MG/30ML
30 SUSPENSION ORAL EVERY 6 HOURS PRN
Status: DISCONTINUED | OUTPATIENT
Start: 2021-04-14 | End: 2021-04-19 | Stop reason: HOSPADM

## 2021-04-14 RX ORDER — ALBUTEROL SULFATE 90 UG/1
2 AEROSOL, METERED RESPIRATORY (INHALATION) ONCE
Status: COMPLETED | OUTPATIENT
Start: 2021-04-14 | End: 2021-04-14

## 2021-04-14 RX ORDER — CYCLOBENZAPRINE HCL 10 MG
10 TABLET ORAL 3 TIMES DAILY
Status: DISCONTINUED | OUTPATIENT
Start: 2021-04-14 | End: 2021-04-19 | Stop reason: HOSPADM

## 2021-04-14 RX ORDER — METHYLPREDNISOLONE SODIUM SUCCINATE 125 MG/2ML
125 INJECTION, POWDER, LYOPHILIZED, FOR SOLUTION INTRAMUSCULAR; INTRAVENOUS ONCE
Status: COMPLETED | OUTPATIENT
Start: 2021-04-14 | End: 2021-04-14

## 2021-04-14 RX ORDER — CLOTRIMAZOLE 1 %
CREAM (GRAM) TOPICAL 2 TIMES DAILY
Status: DISCONTINUED | OUTPATIENT
Start: 2021-04-14 | End: 2021-04-19 | Stop reason: HOSPADM

## 2021-04-14 RX ADMIN — ALBUTEROL SULFATE 10 MG: 2.5 SOLUTION RESPIRATORY (INHALATION) at 19:55

## 2021-04-14 RX ADMIN — ACETAMINOPHEN 650 MG: 325 TABLET ORAL at 21:39

## 2021-04-14 RX ADMIN — METHYLPREDNISOLONE SODIUM SUCCINATE 40 MG: 40 INJECTION, POWDER, FOR SOLUTION INTRAMUSCULAR; INTRAVENOUS at 21:40

## 2021-04-14 RX ADMIN — DOCUSATE SODIUM 100 MG: 100 CAPSULE, LIQUID FILLED ORAL at 21:39

## 2021-04-14 RX ADMIN — FUROSEMIDE 40 MG: 40 TABLET ORAL at 22:18

## 2021-04-14 RX ADMIN — ISODIUM CHLORIDE 3 ML: 0.03 SOLUTION RESPIRATORY (INHALATION) at 19:55

## 2021-04-14 RX ADMIN — METHYLPREDNISOLONE SODIUM SUCCINATE 125 MG: 125 INJECTION, POWDER, FOR SOLUTION INTRAMUSCULAR; INTRAVENOUS at 16:10

## 2021-04-14 RX ADMIN — METOPROLOL TARTRATE 25 MG: 25 TABLET, FILM COATED ORAL at 21:39

## 2021-04-14 RX ADMIN — IOHEXOL 100 ML: 350 INJECTION, SOLUTION INTRAVENOUS at 20:02

## 2021-04-14 RX ADMIN — OXYCODONE HYDROCHLORIDE AND ACETAMINOPHEN 1.5 TABLET: 5; 325 TABLET ORAL at 21:43

## 2021-04-14 RX ADMIN — IPRATROPIUM BROMIDE 1 MG: 0.5 SOLUTION RESPIRATORY (INHALATION) at 19:54

## 2021-04-14 RX ADMIN — CYCLOBENZAPRINE HYDROCHLORIDE 10 MG: 10 TABLET, FILM COATED ORAL at 21:39

## 2021-04-14 RX ADMIN — ALBUTEROL SULFATE 2 PUFF: 90 AEROSOL, METERED RESPIRATORY (INHALATION) at 16:16

## 2021-04-14 NOTE — ED PROVIDER NOTES
History  Chief Complaint   Patient presents with    Chest Pain     pt reports pain over his entire chest for the past couple days  pt has COPD and CHF and has increased SOB  HPI  Patient is a 79-year-old male history of COPD on home 3 L oxygen, hypertension, hyperlipidemia, CAD, CHF, diastolic heart failure presenting for several days of shortness of breath, dyspnea on exertion, bilateral pleuritic chest pain, cough, shortness of breath  Patient states that his cough has been productive of thin clear sputum  Patient denies fevers, chills, palpitations, diaphoresis, syncope, near syncope, states trace bilateral lower extremity edema, denies recent weight changes, orthopnea, paroxysmal nocturnal dyspnea  Patient denies recent travel or sick contacts  Prior to Admission Medications   Prescriptions Last Dose Informant Patient Reported? Taking?    Insulin Syringe/Needle U-500 (BD Insulin Syringe U-500) 31G X 6MM 0 5 ML MISC   No No   Sig: USE 1 SYRINGE THREE TIMES A DAY FOR INJECTING INSULIN   ONE TOUCH ULTRA TEST test strip  Self No No   Sig: TEST FOUR TIMES A DAY   Omega-3 Fatty Acids (FISH OIL) 645 MG CAPS  Self Yes No   Sig: Take 1 capsule by mouth 2 (two) times a day   Trelegy Ellipta 100-62 5-25 MCG/INH inhaler   No No   Sig: INHALE ONE PUFF BY MOUTH EVERY DAY   aspirin (ECOTRIN LOW STRENGTH) 81 mg EC tablet  Self Yes No   Sig: Take 1 tablet by mouth daily   cyclobenzaprine (FLEXERIL) 10 mg tablet   No No   Sig: Take 1 tablet (10 mg total) by mouth 3 (three) times a day   ferrous sulfate 325 (65 Fe) mg tablet  Self Yes No   Sig: Take 325 mg by mouth daily with breakfast   furosemide (LASIX) 40 mg tablet   No No   Sig: TAKE ONE TABLET BY MOUTH TWICE A DAY   insulin regular (HumuLIN R U-500) 500 units/mL CONCENTRATED injection   No No   Sig: Inject 0 09 mL (45 Units total) under the skin 2 (two) times a day before meals And 35 units at bedtime   ketoconazole (NIZORAL) 2 % cream  Self Yes No   Sig: ketoconazole 2 % topical cream   levalbuterol (XOPENEX) 1 25 mg/3 mL nebulizer solution  Self Yes No   Sig: USE 1 VIAL IN NEBULIZER THREE TIMES A DAY   metoprolol tartrate (LOPRESSOR) 25 mg tablet   No No   Sig: TAKE ONE TABLET BY MOUTH TWICE A DAY   nitroglycerin (NITROSTAT) 0 4 mg SL tablet   No No   Sig: Place 1 tablet (0 4 mg total) under the tongue every 5 (five) minutes as needed for chest pain   Patient not taking: Reported on 11/20/2020   oxyCODONE-acetaminophen (PERCOCET) 7 5-325 MG per tablet  Self Yes No   Sig: Take by mouth every 4 (four) hours   pantoprazole (PROTONIX) 40 mg tablet   No No   Sig: TAKE ONE TABLET BY MOUTH EVERY DAY   potassium chloride (K-DUR,KLOR-CON) 20 mEq tablet   No No   Sig: TAKE 1 TABLET BY MOUTH EVERY DAY   simvastatin (ZOCOR) 40 mg tablet   No No   Sig: TAKE ONE TABLET BY MOUTH EVERY DAY   tamsulosin (FLOMAX) 0 4 mg   No No   Sig: TAKE 1 CAPSULE BY MOUTH EVERY DAY   traMADol (ULTRAM) 50 mg tablet  Self Yes No   Sig: tramadol 50 mg tablet      Facility-Administered Medications: None       Past Medical History:   Diagnosis Date    Abdominal pain     Cardiac disease     CHF (congestive heart failure) (HCC)     COPD, severe (HCC)     Coronary artery disease     DDD (degenerative disc disease), lumbar 9/1/2020    Diabetes mellitus (HCC)     Dyspnea     GERD (gastroesophageal reflux disease)     Hyperlipidemia     Hypertension     MI [facility]ocardial infarction) ([facility] [address] )     with 3 stents    Nodule of apex of right lung     JACQUELINE (obstructive sleep apnea)     Prostate cancer [facility])        Past Surgical History:   Procedure Laterality Date    ABDOMINAL SURGERY      exploratory    ANGIOPLASTY      3 stents    APPENDECTOMY      COLONOSCOPY  2015    CT NEEDLE BIOPSY LUNG  11/3/2020    ESOPHAGOGASTRODUODENOSCOPY N/A 10/2/2017    Procedure: ESOPHAGOGASTRODUODENOSCOPY (EGD); Surgeon: Augusto Dawkins MD;  Location: [facility];   Service: Gastroenterology    IR THORACENTESIS  11/3/2020  KNEE CARTILAGE SURGERY      OTHER SURGICAL HISTORY      stent placement    PROSTATE SURGERY      SKIN GRAFT      Basal cell CA back       Family History   Problem Relation Age of Onset    Heart disease Father     Other Father         Mesothelioma      I have reviewed and agree with the history as documented  E-Cigarette/Vaping    E-Cigarette Use Never User      E-Cigarette/Vaping Substances    Nicotine No     THC No     CBD No     Flavoring No     Other No     Unknown No      Social History     Tobacco Use    Smoking status: Former Smoker     Packs/day: 1 50     Years: 50 00     Pack years: 75 00     Quit date: 2020     Years since quittin 7    Smokeless tobacco: Never Used   Substance Use Topics    Alcohol use: Not Currently     Frequency: Never     Binge frequency: Never    Drug use: No        Review of Systems   Constitutional: Negative for chills, fatigue and fever  HENT: Negative for congestion, rhinorrhea and sore throat  Eyes: Negative for photophobia and visual disturbance  Respiratory: Positive for cough, shortness of breath and wheezing  Negative for chest tightness and stridor  Cardiovascular: Positive for chest pain  Negative for palpitations and leg swelling  Gastrointestinal: Negative for abdominal distention, abdominal pain, diarrhea, nausea and vomiting  Endocrine: Negative for polydipsia and polyuria  Genitourinary: Negative for dysuria and hematuria  Musculoskeletal: Negative for arthralgias and myalgias  Skin: Negative for color change, pallor, rash and wound  Neurological: Negative for weakness, numbness and headaches  Psychiatric/Behavioral: Negative for confusion         Physical Exam  ED Triage Vitals   Temperature Pulse Respirations Blood Pressure SpO2   21 1542 21 1531 21 1531 21 1531 21 1531   98 3 °F (36 8 °C) (!) 113 (!) 24 132/65 98 %      Temp Source Heart Rate Source Patient Position - Orthostatic VS BP Location FiO2 (%)   04/14/21 1542 04/14/21 1531 04/14/21 2009 04/14/21 2009 --   Tympanic Monitor Sitting Left arm       Pain Score       04/14/21 2143       8             Orthostatic Vital Signs  Vitals:    04/15/21 1530 04/15/21 2300 04/16/21 0708 04/16/21 1602   BP: 146/67 122/60 138/65 140/63   Pulse: 86 79 94 85   Patient Position - Orthostatic VS: Sitting Lying Sitting Lying       Physical Exam  Vitals signs and nursing note reviewed  Constitutional:       General: He is not in acute distress  Appearance: He is well-developed  He is obese  He is ill-appearing  He is not diaphoretic  Comments: Chronically ill-appearing, mildly distressed    HENT:      Head: Normocephalic and atraumatic  Right Ear: External ear normal       Left Ear: External ear normal       Nose: Nose normal       Mouth/Throat:      Pharynx: No oropharyngeal exudate  Eyes:      Conjunctiva/sclera: Conjunctivae normal       Pupils: Pupils are equal, round, and reactive to light  Cardiovascular:      Rate and Rhythm: Normal rate and regular rhythm  Heart sounds: Normal heart sounds  No murmur  No friction rub  No gallop  Pulmonary:      Effort: Tachypnea and respiratory distress present  Breath sounds: Wheezing present  Comments: Mild respiratory distress, increased WOB, diffuse wheezes, poor air movement in the lung bases  On 4L NC   Chest:      Chest wall: No tenderness  Abdominal:      General: Bowel sounds are normal  There is no distension  Palpations: Abdomen is soft  There is no mass  Tenderness: There is no abdominal tenderness  There is no guarding or rebound  Musculoskeletal:         General: No deformity  Comments: Bilateral lower extremity edema    Skin:     General: Skin is warm and dry  Capillary Refill: Capillary refill takes less than 2 seconds  Neurological:      Mental Status: He is alert and oriented to person, place, and time     Psychiatric:         Behavior: Behavior normal          ED Medications  Medications   aspirin (ECOTRIN LOW STRENGTH) EC tablet 81 mg (81 mg Oral Given 4/16/21 0832)   cyclobenzaprine (FLEXERIL) tablet 10 mg (10 mg Oral Given 4/16/21 1712)   ferrous sulfate tablet 325 mg (325 mg Oral Given 4/16/21 0646)   furosemide (LASIX) tablet 40 mg (40 mg Oral Not Given 4/16/21 1712)   clotrimazole (LOTRIMIN) 1 % cream ( Topical Given 4/16/21 1713)   metoprolol tartrate (LOPRESSOR) tablet 25 mg (25 mg Oral Given 4/16/21 1713)   fish oil capsule 1,000 mg (1,000 mg Oral Given 4/16/21 1713)   oxyCODONE-acetaminophen (PERCOCET) 5-325 mg per tablet 1 5 tablet (1 5 tablets Oral Given 4/16/21 2001)   pantoprazole (PROTONIX) EC tablet 40 mg (40 mg Oral Given 4/16/21 0646)   pravastatin (PRAVACHOL) tablet 80 mg (80 mg Oral Given 4/16/21 1712)   tamsulosin (FLOMAX) capsule 0 4 mg (0 4 mg Oral Given 4/16/21 1712)   acetaminophen (TYLENOL) tablet 650 mg (650 mg Oral Given 4/14/21 2139)   docusate sodium (COLACE) capsule 100 mg (100 mg Oral Given 4/14/21 2139)   ondansetron (ZOFRAN) injection 4 mg (has no administration in time range)   aluminum-magnesium hydroxide-simethicone (MYLANTA) oral suspension 30 mL (has no administration in time range)   guaiFENesin (MUCINEX) 12 hr tablet 600 mg (600 mg Oral Given 4/16/21 1713)   levalbuterol (XOPENEX) inhalation solution 1 25 mg (has no administration in time range)     And   sodium chloride 0 9 % inhalation solution 3 mL (has no administration in time range)   enoxaparin (LOVENOX) subcutaneous injection 40 mg (40 mg Subcutaneous Given 4/16/21 0832)   levalbuterol (XOPENEX) inhalation solution 1 25 mg (1 25 mg Nebulization Given 4/16/21 1938)   ipratropium (ATROVENT) 0 02 % inhalation solution 0 5 mg (0 5 mg Nebulization Given 4/16/21 1937)   budesonide (PULMICORT) inhalation solution 0 5 mg (0 5 mg Nebulization Given 4/16/21 1937)   predniSONE tablet 40 mg (has no administration in time range)     Followed by   predniSONE tablet 30 mg (has no administration in time range)     Followed by   predniSONE tablet 20 mg (has no administration in time range)     Followed by   predniSONE tablet 10 mg (has no administration in time range)   insulin regular (HumuLIN R U-500) 500 units/mL CONCENTRATED injection 45 Units (45 Units Subcutaneous Given 4/16/21 1713)   insulin lispro (HumaLOG) 100 units/mL subcutaneous injection 1-6 Units (2 Units Subcutaneous Given 4/16/21 1710)   insulin lispro (HumaLOG) 100 units/mL subcutaneous injection 1-5 Units (has no administration in time range)   insulin regular (HumuLIN R U-500) 500 units/mL CONCENTRATED injection 50 Units (has no administration in time range)   albuterol (PROVENTIL HFA,VENTOLIN HFA) inhaler 2 puff (2 puffs Inhalation Given 4/14/21 1616)   methylPREDNISolone sodium succinate (Solu-MEDROL) injection 125 mg (125 mg Intravenous Given 4/14/21 1610)   albuterol inhalation solution 10 mg (10 mg Nebulization Given 4/14/21 1955)     And   ipratropium (ATROVENT) 0 02 % inhalation solution 1 mg (1 mg Nebulization Given 4/14/21 1954)     And   sodium chloride 0 9 % inhalation solution 3 mL (3 mL Nebulization Given 4/14/21 1955)   iohexol (OMNIPAQUE) 350 MG/ML injection (MULTI-DOSE) 100 mL (100 mL Intravenous Given 4/14/21 2002)   sodium chloride 0 9 % bolus 500 mL (0 mL Intravenous Stopped 4/15/21 0230)   insulin regular (HumuLIN R,NovoLIN R) injection 10 Units (10 Units Intravenous Given 4/15/21 0102)   methylPREDNISolone sodium succinate (Solu-MEDROL) injection 40 mg (40 mg Intravenous Given 4/16/21 1713)       Diagnostic Studies  Results Reviewed     Procedure Component Value Units Date/Time    Body fluid culture (Pleural Fluid Culture) and Gram stain [[id number]] Collected: 04/15/21 1010    Lab Status: Preliminary result Specimen:  Body Fluid from Pleural, Right Updated: 04/16/21 1350     Body Fluid Culture, Sterile No growth     Gram Stain Result 1+ Polys      No bacteria seen    Sputum culture and Gram stain [[id number]]  (Abnormal) Collected: 04/15/21 0453    Lab Status: Preliminary result Specimen: Expectorated Sputum Updated: 04/16/21 1209     Sputum Culture Culture too young- will reincubate     Gram Stain Result 1+ Epithelial cells per low power field      No Polys      2+ Gram positive cocci in pairs      1+ Gram negative rods    Basic metabolic panel [[id number]]  (Abnormal) Collected: 04/16/21 0629    Lab Status: Final result Specimen: Blood from Arm, Left Updated: 04/16/21 0748     Sodium 134 mmol/L      Potassium 4 8 mmol/L      Chloride 96 mmol/L      CO2 37 mmol/L      ANION GAP 1 mmol/L      BUN 34 mg/dL      Creatinine 1 29 mg/dL      Glucose 302 mg/dL      Calcium 8 7 mg/dL      eGFR 55 ml/min/1 73sq m     Narrative:      [place] guidelines for Chronic Kidney Disease (CKD):     Stage 1 with normal or high GFR (GFR > 90 mL/min/1 73 square meters)    Stage 2 Mild CKD (GFR = 60-89 mL/min/1 73 square meters)    Stage 3A Moderate CKD (GFR = 45-59 mL/min/1 73 square meters)    Stage 3B Moderate CKD (GFR = 30-44 mL/min/1 73 square meters)    Stage 4 Severe CKD (GFR = 15-29 mL/min/1 73 square meters)    Stage 5 End Stage CKD (GFR <15 mL/min/1 73 square meters)  Note: GFR calculation is accurate only with a steady state creatinine    CBC [[id number]]  (Abnormal) Collected: 04/16/21 0629    Lab Status: Final result Specimen: Blood from Arm, Left Updated: 04/16/21 0732     WBC 9 57 Thousand/uL      RBC 4 38 Million/uL      Hemoglobin 10 7 g/dL      Hematocrit 35 6 %      MCV 81 fL      MCH 24 4 pg      MCHC 30 1 g/dL      RDW 16 6 %      Platelets 629 Thousands/uL      MPV 10 5 fL     Body fluid white cell count with differential [[id number]]  (Abnormal) Collected: 04/15/21 1011    Lab Status: Final result Specimen:  Body Fluid from Pleural, Right Updated: 04/15/21 1448     Site Right Pleural Fluid     Color, Fluid Red     Clarity, Fluid Cloudy     WBC, Fluid 1,255 /ul Body Fluid Diff [[id number]] Collected: 04/15/21 1011    Lab Status: Final result Specimen: Body Fluid from Pleural, Right Updated: 04/15/21 1447     Total Counted 100     Neutrophils % (Fluid) 7 %      Lymphs % (Fluid) 81 %      Histiocyte % (Fluid) 10 %      Monocytes % (Fluid) 2 %     Basic metabolic panel [[id number]]  (Abnormal) Collected: 04/15/21 1316    Lab Status: Final result Specimen: Blood from Arm, Right Updated: 04/15/21 1350     Sodium 135 mmol/L      Potassium 4 8 mmol/L      Chloride 95 mmol/L      CO2 35 mmol/L      ANION GAP 5 mmol/L      BUN 28 mg/dL      Creatinine 1 38 mg/dL      Glucose 248 mg/dL      Calcium 9 1 mg/dL      eGFR 50 ml/min/1 73sq m     Narrative:      [place] guidelines for Chronic Kidney Disease (CKD):     Stage 1 with normal or high GFR (GFR > 90 mL/min/1 73 square meters)    Stage 2 Mild CKD (GFR = 60-89 mL/min/1 73 square meters)    Stage 3A Moderate CKD (GFR = 45-59 mL/min/1 73 square meters)    Stage 3B Moderate CKD (GFR = 30-44 mL/min/1 73 square meters)    Stage 4 Severe CKD (GFR = 15-29 mL/min/1 73 square meters)    Stage 5 End Stage CKD (GFR <15 mL/min/1 73 square meters)  Note: GFR calculation is accurate only with a steady state creatinine    Lactate dehydrogenase [[id number]]  (Abnormal) Collected: 04/15/21 0442    Lab Status: Final result Specimen: Blood from Arm, Left Updated: 04/15/21 1055      U/L     LD (LDH), Body Fluid [[id number]] Collected: 04/15/21 1011    Lab Status: Final result Specimen: Body Fluid from Other Updated: 04/15/21 1046     LD, Fluid 81 U/L     Total Protein, body fluid [[id number]] Collected: 04/15/21 1011    Lab Status: Final result Specimen: Body Fluid from Other Updated: 04/15/21 1046     Protein, Fluid 4 8 g/dL     Glucose, body fluid [[id number]] Collected: 04/15/21 1011    Lab Status: Final result Specimen:  Body Fluid from Other Updated: 04/15/21 1046     Glucose, Fluid 413 mg/dL     Hematocrit, body fluid [[id number]] Collected: 04/15/21 1011    Lab Status: Final result Specimen: Body Fluid from Lung Updated: 04/15/21 1046     Hematocrit, Fluid 0 6 %     pH, body fluid [[id number]] Collected: 04/15/21 1010    Lab Status: Final result Specimen: Body Fluid from Other Updated: 04/15/21 1043     PH BODY FLUID 7 4    Protein, total [[id number]]     Lab Status: No result Specimen: Blood     Fingerstick Glucose (POCT) [[id number]]  (Abnormal) Collected: 04/15/21 0735    Lab Status: Final result Updated: 04/15/21 0736     POC Glucose 319 mg/dl     Manual Differential(PHLEBS Do Not Order) [[id number]]  (Abnormal) Collected: 04/15/21 0441    Lab Status: Final result Specimen: Blood from Arm, Left Updated: 04/15/21 0706     Segmented % 92 %      Lymphocytes % 2 %      Monocytes % 1 %      Eosinophils, % 2 %      Basophils % 0 %      Atypical Lymphocytes % 3 %      Absolute Neutrophils 6 82 Thousand/uL      Lymphocytes Absolute 0 15 Thousand/uL      Monocytes Absolute 0 07 Thousand/uL      Eosinophils Absolute 0 15 Thousand/uL      Basophils Absolute 0 00 Thousand/uL      Total Counted --     Smudge Cells Present     RBC Morphology Present     Anisocytosis Present     Microcytes Present     Ovalocytes Present     Poikilocytes Present     Polychromasia Present     Platelet Estimate Adequate     Giant PLTs Present    CBC and differential [[id number]]  (Abnormal) Collected: 04/15/21 0441    Lab Status: Final result Specimen: Blood from Arm, Left Updated: 04/15/21 0706     WBC 7 41 Thousand/uL      RBC 4 27 Million/uL      Hemoglobin 10 6 g/dL      Hematocrit 34 7 %      MCV 81 fL      MCH 24 8 pg      MCHC 30 5 g/dL      RDW 16 5 %      MPV 10 4 fL      Platelets 427 Thousands/uL      nRBC 0 /100 WBCs     Narrative: This is an appended report  These results have been appended to a previously verified report      Procalcitonin with AM Reflex [[id number]]  (Normal) Collected: 04/15/21 0442    Lab Status: Final result Specimen: Blood from Arm, Left Updated: 04/15/21 0602     Procalcitonin 0 08 ng/ml     Comprehensive metabolic panel [[id number]]  (Abnormal) Collected: 04/15/21 0442    Lab Status: Final result Specimen: Blood from Arm, Left Updated: 04/15/21 0511     Sodium 132 mmol/L      Potassium 5 4 mmol/L      Chloride 94 mmol/L      CO2 32 mmol/L      ANION GAP 6 mmol/L      BUN 24 mg/dL      Creatinine 1 58 mg/dL      Glucose 448 mg/dL      Calcium 8 8 mg/dL      Corrected Calcium 9 7 mg/dL      AST 23 U/L      ALT 15 U/L      Alkaline Phosphatase 80 U/L      Total Protein 7 5 g/dL      Albumin 2 9 g/dL      Total Bilirubin 0 34 mg/dL      eGFR 43 ml/min/1 73sq m     Narrative:      [place] guidelines for Chronic Kidney Disease (CKD):     Stage 1 with normal or high GFR (GFR > 90 mL/min/1 73 square meters)    Stage 2 Mild CKD (GFR = 60-89 mL/min/1 73 square meters)    Stage 3A Moderate CKD (GFR = 45-59 mL/min/1 73 square meters)    Stage 3B Moderate CKD (GFR = 30-44 mL/min/1 73 square meters)    Stage 4 Severe CKD (GFR = 15-29 mL/min/1 73 square meters)    Stage 5 End Stage CKD (GFR <15 mL/min/1 73 square meters)  Note: GFR calculation is accurate only with a steady state creatinine    Magnesium [[id number]]  (Normal) Collected: 04/15/21 0442    Lab Status: Final result Specimen: Blood from Arm, Left Updated: 04/15/21 0511     Magnesium 2 1 mg/dL     Phosphorus [[id number]]  (Normal) Collected: 04/15/21 0442    Lab Status: Final result Specimen: Blood from Arm, Left Updated: 04/15/21 0511     Phosphorus 3 2 mg/dL     Protime-INR [[id number]]  (Normal) Collected: 04/15/21 0442    Lab Status: Final result Specimen: Blood from Arm, Left Updated: 04/15/21 0507     Protime 13 3 seconds      INR 1 01    APTT [[id number]]  (Normal) Collected: 04/15/21 0442    Lab Status: Final result Specimen: Blood from Arm, Left Updated: 04/15/21 0507     PTT 28 seconds     Fingerstick Glucose (POCT) [[id number]]  (Abnormal) Collected: 04/15/21 0212    Lab Status: Final result Updated: 04/15/21 0213     POC Glucose 491 mg/dl     Fingerstick Glucose (POCT) [[id number]]  (Abnormal) Collected: 04/15/21 0042    Lab Status: Final result Updated: 04/15/21 0043     POC Glucose >500 mg/dl     Urine Microscopic [[id number]]  (Normal) Collected: 04/14/21 2228    Lab Status: Final result Specimen: Urine, Clean Catch Updated: 04/14/21 2257     RBC, UA None Seen /hpf      WBC, UA None Seen /hpf      Epithelial Cells None Seen /hpf      Bacteria, UA None Seen /hpf      Hyaline Casts, UA None Seen /lpf     UA (URINE) with reflex to Scope [[id number]]  (Abnormal) Collected: 04/14/21 2228    Lab Status: Final result Specimen: Urine, Clean Catch Updated: 04/14/21 2254     Color, UA Yellow     Clarity, UA Clear     Specific Gravity, UA 1 026     pH, UA 5 5     Leukocytes, UA Elevated glucose may cause decreased leukocyte values  See urine microscopic for [facility] result/     Nitrite, UA Negative     Protein, UA 30 (1+) mg/dl      Glucose, UA >=1000 (1%) mg/dl      Ketones, UA Negative mg/dl      Urobilinogen, UA 1 0 E U /dl      Bilirubin, UA Negative     Blood, UA Negative    Hemoglobin A1c w/EAG Estimation (Orders if not completed within the last 90 days) [[id number]]  (Abnormal) Collected: 04/14/21 2139    Lab Status: Final result Specimen: Blood from Arm, Right Updated: 04/14/21 2225     Hemoglobin A1C 9 2 %       mg/dl     COVID19, Influenza A/B, RSV PCR, [facility] [[id number]]  (Normal) Collected: 04/14/21 1642    Lab Status: Final result Specimen: Nares from Nose Updated: 04/14/21 1755     SARS-CoV-2 Negative     INFLUENZA A PCR Negative     INFLUENZA B PCR Negative     RSV PCR Negative    Narrative: This test has been authorized by FDA under an EUA (Emergency Use Assay) for use by authorized laboratories    Clinical caution and judgement should be used with the interpretation of these results with consideration of the clinical impression and other laboratory testing  Testing reported as "Positive" or "Negative" has been proven to be accurate according to standard laboratory validation requirements  All testing is performed with control materials showing appropriate reactivity at standard intervals      Comprehensive metabolic panel [[id number]]  (Abnormal) Collected: 04/14/21 1541    Lab Status: Final result Specimen: Blood from Arm, Right Updated: 04/14/21 1617     Sodium 134 mmol/L      Potassium 4 9 mmol/L      Chloride 97 mmol/L      CO2 35 mmol/L      ANION GAP 2 mmol/L      BUN 18 mg/dL      Creatinine 1 26 mg/dL      Glucose 319 mg/dL      Calcium 8 8 mg/dL      Corrected Calcium 9 6 mg/dL      AST 21 U/L      ALT 16 U/L      Alkaline Phosphatase 89 U/L      Total Protein 7 7 g/dL      Albumin 3 0 g/dL      Total Bilirubin 0 29 mg/dL      eGFR 56 ml/min/1 73sq m     Narrative:      [facility] guidelines for Chronic Kidney Disease (CKD):     Stage 1 with normal or high GFR (GFR > 90 mL/min/1 73 square meters)    Stage 2 Mild CKD (GFR = 60-89 mL/min/1 73 square meters)    Stage 3A Moderate CKD (GFR = 45-59 mL/min/1 73 square meters)    Stage 3B Moderate CKD (GFR = 30-44 mL/min/1 73 square meters)    Stage 4 Severe CKD (GFR = 15-29 mL/min/1 73 square meters)    Stage 5 End Stage CKD (GFR <15 mL/min/1 73 square meters)  Note: GFR calculation is accurate only with a steady state creatinine    Troponin I [[id number]]  (Normal) Collected: 04/14/21 1541    Lab Status: Final result Specimen: Blood from Arm, Right Updated: 04/14/21 1616     Troponin I <0 02 ng/mL     CBC and differential [[id number]]  (Abnormal) Collected: 04/14/21 1541    Lab Status: Final result Specimen: Blood from Arm, Right Updated: 04/14/21 1602     WBC 8 44 Thousand/uL      RBC 4 59 Million/uL      Hemoglobin 11 3 g/dL      Hematocrit 37 7 %      MCV 82 fL      MCH 24 6 pg      MCHC 30 0 g/dL      RDW 16 9 %      MPV 10 6 fL      Platelets 908 Thousands/uL nRBC 0 /100 WBCs      Neutrophils Relative 83 %      Immat GRANS % 1 %      Lymphocytes Relative 9 %      Monocytes Relative 6 %      Eosinophils Relative 1 %      Basophils Relative 0 %      Neutrophils Absolute 7 02 Thousands/µL      Immature Grans Absolute 0 04 Thousand/uL      Lymphocytes Absolute 0 74 Thousands/µL      Monocytes Absolute 0 53 Thousand/µL      Eosinophils Absolute 0 08 Thousand/µL      Basophils Absolute 0 03 Thousands/µL                  XR chest portable   Final Result by Juan Baker MD (04/15 1128)      Decreased right pleural effusion with persistent consolidation throughout the right lung and volume loss with shift of the mediastinum to the right  No discernible pneumothorax  Workstation performed: [id number]         CTA ED chest PE Study   Final Result by Agnieszka Viramontes MD (04/14 2025)         1  Technically limited exam due to respiratory motion artifact  No evidence of proximal pulmonary artery embolus, thoracic aortic aneurysm or dissection  2   Moderate right pleural effusion  3   Mild compressive atelectasis in the right lung  Few airspace opacities in the upper lung could represent mild aspiration or pneumonia  Workstation performed: [id number]         XR chest 1 view portable   Final Result by Karson Wilson MD (84/87 0712)      Right lung opacity corresponding with right upper lobe pneumonia, right base rounded atelectasis, and moderate effusion per comparison with chest CT  Workstation performed: [id number]               Procedures  Procedures      ED Course               Identification of Seniors at Risk      Most Recent Value   (ISAR) Identification of Seniors at Risk   Before the illness or injury that brought you to the Emergency, did you need someone to help you on a regular basis?   1 Filed at: 04/14/2021 1532   In the last 24 hours, have you needed more help than usual?  1 Filed at: 04/14/2021 1532   Have you been hospitalized for one or more nights during the past 6 months? 1 Filed at: 04/14/2021 1532   In general, do you see well?  0 Filed at: 04/14/2021 1532   In general, do you have serious problems with your memory? 0 Filed at: 04/14/2021 1532   Do you take more than three different medications every day? 1 Filed at: 04/14/2021 1532   ISAR Score  4 Filed at: 04/14/2021 1532                    SBIRT 22yo+      Most Recent Value   SBIRT (25 yo +)   In order to provide better care to our patients, we are screening all of our patients for alcohol and drug use  Would it be okay to ask you these screening questions? No Filed at: 04/14/2021 1604                MDM  Number of Diagnoses or Management Options  COPD with acute exacerbation [facility]):   Diagnosis management comments: 72-year-old male history of COPD, CHF, ill-appearing, mild respiratory distress, on 4 L nasal cannula, previously on 3 L home O2, diffuse wheezes, complaining of pleuritic chest pain, moderate risk Wells, cardiac labs, EKG, chest x-ray all checked, chest x-ray demonstrating right-sided pleural effusion and opacification however limited by patient's degree of rotation, CT PE study performed, COVID testing performed and negative, patient signed out with PE study pending, ultimately negative, treated with Solu-Medrol, initially stacked albuterol MDI, later escalated to Heart neb with minor improvement of respiratory status, admitted for COPD exacerbation versus CHF exacerbation      Patient Progress  Patient progress: improved      Disposition  Final diagnoses:   COPD with acute exacerbation ([email or web address] 75 )     Time reflects when diagnosis was documented in both MDM as applicable and the Disposition within this note     Time User Action Codes Description Comment    4/14/2021  7:59 PM Santi Leonard [J44 1] COPD with acute exacerbation ([email or web address] 75 )     4/15/2021 10:22 AM Vince Leonard [J90] Recurrent right pleural effusion     4/16/2021 11:30 AM Lynda Workman Add [E11 65,  Z79 4] Type 2 diabetes mellitus with hyperglycemia, with long-term current use of insulin ([facility] )     4/16/2021 11:30 AM Marcia Workman Modify [E11 65,  Z79 4] Type 2 diabetes mellitus with hyperglycemia, with long-term current use of insulin [facility])       ED Disposition     ED Disposition Condition Date/Time Comment    Admit Stable Wed Apr 14, 2021  7:53 PM Case was discussed with Dr Adelso Chambers and the patient's admission status was agreed to be Admission Status:  Inpatient admission to the service of Dr Adelso Chamebrs  Follow-up Information    None         Current Discharge Medication List      CONTINUE these medications which have NOT CHANGED    Details   aspirin (ECOTRIN LOW STRENGTH) 81 mg EC tablet Take 1 tablet by mouth daily      cyclobenzaprine (FLEXERIL) 10 mg tablet Take 1 tablet (10 mg total) by mouth 3 (three) times a day  Qty: 60 tablet, Refills: 0    Associated Diagnoses: Chronic bilateral low back pain, unspecified whether sciatica present      ferrous sulfate 325 (65 Fe) mg tablet Take 325 mg by mouth daily with breakfast      furosemide (LASIX) 40 mg tablet TAKE ONE TABLET BY MOUTH TWICE A DAY  Qty: 60 tablet, Refills: 4    Associated Diagnoses: Acute on chronic diastolic (congestive) heart failure (HCC)      insulin regular (HumuLIN R U-500) 500 units/mL CONCENTRATED injection Inject 0 09 mL (45 Units total) under the skin 2 (two) times a day before meals And 35 units at bedtime    Associated Diagnoses: Chronic respiratory failure with hypoxia and hypercapnia ([facility] );  Type 2 diabetes mellitus with complication, with long-term current use of insulin (HCC)      Insulin Syringe/Needle U-500 (BD Insulin Syringe U-500) 31G X 6MM 0 5 ML MISC USE 1 SYRINGE THREE TIMES A DAY FOR INJECTING INSULIN  Qty: 100 each, Refills: 5    Associated Diagnoses: Type 2 diabetes mellitus with hyperglycemia, with long-term current use of insulin ([facility])      ketoconazole (NIZORAL) 2 % cream ketoconazole 2 % topical cream      levalbuterol (XOPENEX) 1 25 mg/3 mL nebulizer solution USE 1 VIAL IN NEBULIZER THREE TIMES A DAY  Refills: 5      metoprolol tartrate (LOPRESSOR) 25 mg tablet TAKE ONE TABLET BY MOUTH TWICE A DAY  Qty: 60 tablet, Refills: 5    Associated Diagnoses: Coronary artery disease involving native coronary artery of native heart without angina pectoris      nitroglycerin (NITROSTAT) 0 4 mg SL tablet Place 1 tablet (0 4 mg total) under the tongue every 5 (five) minutes as needed for chest pain  Qty: 30 tablet, Refills: 5    Associated Diagnoses: Coronary artery disease involving native coronary artery of native heart without angina pectoris      Omega-3 Fatty Acids (FISH OIL) 645 MG CAPS Take 1 capsule by mouth 2 (two) times a day      ONE TOUCH ULTRA TEST test strip TEST FOUR TIMES A DAY  Qty: 150 each, Refills: 5    Associated Diagnoses: Diabetes mellitus without complication (HCC)      oxyCODONE-acetaminophen (PERCOCET) 7 5-325 MG per tablet Take by mouth every 4 (four) hours      pantoprazole (PROTONIX) 40 mg tablet TAKE ONE TABLET BY MOUTH EVERY DAY  Qty: 60 tablet, Refills: 2    Associated Diagnoses: Gastroesophageal reflux disease      potassium chloride (K-DUR,KLOR-CON) 20 mEq tablet TAKE 1 TABLET BY MOUTH EVERY DAY  Qty: 60 tablet, Refills: 2    Associated Diagnoses: Chronic respiratory failure with hypoxia and hypercapnia (HCC)      simvastatin (ZOCOR) 40 mg tablet TAKE ONE TABLET BY MOUTH EVERY DAY  Qty: 60 tablet, Refills: 2    Associated Diagnoses: Hypercholesterolemia      tamsulosin (FLOMAX) 0 4 mg TAKE 1 CAPSULE BY MOUTH EVERY DAY  Qty: 60 capsule, Refills: 2    Associated Diagnoses: Urinary frequency      traMADol (ULTRAM) 50 mg tablet tramadol 50 mg tablet      Trelegy Ellipta 100-62 5-25 MCG/INH inhaler INHALE ONE PUFF BY MOUTH EVERY DAY  Qty: 60 each, Refills: 3    Associated Diagnoses: Chronic obstructive pulmonary disease, unspecified COPD type (HCC)           No discharge procedures on file     PDMP Review       Value Time User    PDMP Reviewed  Yes 4/15/2021 10:19 AM Arianne Ren, [address] [place]            ED Provider  Attending physically available and evaluated Marcy Salinas Sr    I managed the patient along with the ED Attending      Electronically Signed by         Timmy Rosas MD  04/16/21 6718

## 2021-04-14 NOTE — ED ATTENDING ATTESTATION
4/14/2021  I, Alejandra Upton MD, saw and evaluated the patient  I have discussed the patient with the resident/non-physician practitioner and agree with the resident's/non-physician practitioner's findings, Plan of Care, and MDM as documented in the resident's/non-physician practitioner's note, except where noted  All available labs and Radiology studies were reviewed  I was present for key portions of any procedure(s) performed by the resident/non-physician practitioner and I was immediately available to provide assistance  At this point I agree with the current assessment done in the Emergency Department  I have conducted an independent evaluation of this patient a history and physical is as follows:    ED Course     44-year-old male, history of COPD and CHF, presenting to the emergency department for evaluation of increased shortness of breath over the past couple of days  Increased cough intermittently productive of sputum  No reported fever  Patient has precordial pleuritic chest pain  Patient denies any abdominal pain, nausea, vomiting, diarrhea  No new lower extremity edema  Patient has been compliant with medications  Ten systems reviewed and negative except as noted in the history of present illness  On examination the patient is sitting upright in the stretcher, with movement he has increased difficulty breathing and will breathe with pursed lips  Head is normocephalic and atraumatic  Eyelids lashes are normal   Mucous membranes are dry  Neck without JVD  Lungs are diminished on the right side  Patient with decreased breath sounds in the left base  Patient is wheezing bilateral upper lung fields  Abdomen is obese, soft, nontender  Lower extremities with chronic venous stasis changes, but just trace lower extremity edema  Increasing shortness of breath  Evaluate for COVID, pneumonia, most likely COPD exacerbation      Labs Reviewed   CBC AND DIFFERENTIAL - Abnormal Result Value Ref Range Status    WBC 8 44  4 31 - 10 16 Thousand/uL Final    RBC 4 59  3 88 - 5 62 Million/uL Final    Hemoglobin 11 3 (*) 12 0 - 17 0 g/dL Final    Hematocrit 37 7  36 5 - 49 3 % Final    MCV 82  82 - 98 fL Final    MCH 24 6 (*) 26 8 - 34 3 pg Final    MCHC 30 0 (*) 31 4 - 37 4 g/dL Final    RDW 16 9 (*) 11 6 - 15 1 % Final    MPV 10 6  8 9 - 12 7 fL Final    Platelets 462 (*) 003 - 390 Thousands/uL Final    nRBC 0  /100 WBCs Final    Neutrophils Relative 83 (*) 43 - 75 % Final    Immat GRANS % 1  0 - 2 % Final    Lymphocytes Relative 9 (*) 14 - 44 % Final    Monocytes Relative 6  4 - 12 % Final    Eosinophils Relative 1  0 - 6 % Final    Basophils Relative 0  0 - 1 % Final    Neutrophils Absolute 7 02  1 85 - 7 62 Thousands/µL Final    Immature Grans Absolute 0 04  0 00 - 0 20 Thousand/uL Final    Lymphocytes Absolute 0 74  0 60 - 4 47 Thousands/µL Final    Monocytes Absolute 0 53  0 17 - 1 22 Thousand/µL Final    Eosinophils Absolute 0 08  0 00 - 0 61 Thousand/µL Final    Basophils Absolute 0 03  0 00 - 0 10 Thousands/µL Final   COMPREHENSIVE METABOLIC PANEL - Abnormal    Sodium 134 (*) 136 - 145 mmol/L Final    Potassium 4 9  3 5 - 5 3 mmol/L Final    Comment: Slightly Hemolyzed; Results May be Affected    Chloride 97 (*) 100 - 108 mmol/L Final    CO2 35 (*) 21 - 32 mmol/L Final    ANION GAP 2 (*) 4 - 13 mmol/L Final    BUN 18  5 - 25 mg/dL Final    Creatinine 1 26  0 60 - 1 30 mg/dL Final    Comment: Standardized to IDMS reference method    Glucose 319 (*) 65 - 140 mg/dL Final    Comment: If the patient is fasting, the ADA then defines impaired fasting glucose as > 100 mg/dL and diabetes as > or equal to 123 mg/dL  Specimen collection should occur prior to Sulfasalazine administration due to the potential for falsely depressed results  Specimen collection should occur prior to Sulfapyridine administration due to the potential for falsely elevated results      Calcium 8 8  8 3 - 10 1 mg/dL Final Corrected Calcium 9 6  8 3 - 10 1 mg/dL Final    AST 21  5 - 45 U/L Final    Comment: Slightly Hemolyzed; Results May be Affected  Specimen collection should occur prior to Sulfasalazine administration due to the potential for falsely depressed results  ALT 16  12 - 78 U/L Final    Comment: Specimen collection should occur prior to Sulfasalazine and/or Sulfapyridine administration due to the potential for falsely depressed results  Alkaline Phosphatase 89  46 - 116 U/L Final    Total Protein 7 7  6 4 - 8 2 g/dL Final    Albumin 3 0 (*) 3 5 - 5 0 g/dL Final    Total Bilirubin 0 29  0 20 - 1 00 mg/dL Final    Comment: Use of this assay is not recommended for patients undergoing treatment with eltrombopag due to the potential for falsely elevated results  eGFR 56  ml/min/1 73sq m Final    Narrative:     [place] guidelines for Chronic Kidney Disease (CKD):     Stage 1 with normal or high GFR (GFR > 90 mL/min/1 73 square meters)    Stage 2 Mild CKD (GFR = 60-89 mL/min/1 73 square meters)    Stage 3A Moderate CKD (GFR = 45-59 mL/min/1 73 square meters)    Stage 3B Moderate CKD (GFR = 30-44 mL/min/1 73 square meters)    Stage 4 Severe CKD (GFR = 15-29 mL/min/1 73 square meters)    Stage 5 End Stage CKD (GFR <15 mL/min/1 73 square meters)  Note: GFR calculation is accurate only with a steady state creatinine   COVID19, INFLUENZA A/B, RSV PCR, [facility] - Normal    SARS-CoV-2 Negative  Negative Final    INFLUENZA A PCR Negative  Negative Final    INFLUENZA B PCR Negative  Negative Final    RSV PCR Negative  Negative Final    Narrative: This test has been authorized by FDA under an EUA (Emergency Use Assay) for use by authorized laboratories  Clinical caution and judgement should be used with the interpretation of these results with consideration of the clinical impression and other laboratory testing    Testing reported as "Positive" or "Negative" has been proven to be accurate according to standard laboratory validation requirements  All testing is performed with control materials showing appropriate reactivity at standard intervals  TROPONIN I - Normal    Troponin I <0 02  <=0 04 ng/mL Final    Comment: Siemens Chemistry analyzer 99% cutoff is > 0 04 ng/mL in network labs     o cTnI 99% cutoff is useful only when applied to patients in the clinical setting of myocardial ischemia   o cTnI 99% cutoff should be interpreted in the context of clinical history, ECG findings and possibly cardiac imaging to establish correct diagnosis  o cTnI 99% cutoff may be suggestive but clearly not indicative of a coronary event without the clinical setting of myocardial ischemia  SPUTUM CULTURE AND GRAM STAIN   URINALYSIS WITH REFLEX TO SCOPE   PROTIME-INR   APTT   HEMOGLOBIN A1C   LIGHT BLUE TOP       CTA ED chest PE Study   Final Result         1  Technically limited exam due to respiratory motion artifact  No evidence of proximal pulmonary artery embolus, thoracic aortic aneurysm or dissection  2   Moderate right pleural effusion  3   Mild compressive atelectasis in the right lung  Few airspace opacities in the upper lung could represent mild aspiration or pneumonia                    Workstation performed: [id number]         XR chest 1 view portable    (Results Pending)   IR IN-Patient Thoracentesis    (Results Pending)           Critical Care Time  Procedures

## 2021-04-15 ENCOUNTER — APPOINTMENT (INPATIENT)
Dept: RADIOLOGY | Facility: HOSPITAL | Age: 76
DRG: 181 | End: 2021-04-15
Payer: COMMERCIAL

## 2021-04-15 PROBLEM — E87.5 HYPERKALEMIA: Status: ACTIVE | Noted: 2021-04-15

## 2021-04-15 PROBLEM — J96.11 CHRONIC RESPIRATORY FAILURE WITH HYPOXIA (HCC): Status: ACTIVE | Noted: 2021-04-15

## 2021-04-15 LAB
ALBUMIN SERPL BCP-MCNC: 2.9 G/DL (ref 3.5–5)
ALP SERPL-CCNC: 80 U/L (ref 46–116)
ALT SERPL W P-5'-P-CCNC: 15 U/L (ref 12–78)
ANION GAP SERPL CALCULATED.3IONS-SCNC: 5 MMOL/L (ref 4–13)
ANION GAP SERPL CALCULATED.3IONS-SCNC: 6 MMOL/L (ref 4–13)
ANISOCYTOSIS BLD QL SMEAR: PRESENT
APPEARANCE FLD: ABNORMAL
APTT PPP: 28 SECONDS (ref 23–37)
AST SERPL W P-5'-P-CCNC: 23 U/L (ref 5–45)
BASOPHILS # BLD MANUAL: 0 THOUSAND/UL (ref 0–0.1)
BASOPHILS NFR MAR MANUAL: 0 % (ref 0–1)
BILIRUB SERPL-MCNC: 0.34 MG/DL (ref 0.2–1)
BUN SERPL-MCNC: 24 MG/DL (ref 5–25)
BUN SERPL-MCNC: 28 MG/DL (ref 5–25)
CALCIUM ALBUM COR SERPL-MCNC: 9.7 MG/DL (ref 8.3–10.1)
CALCIUM SERPL-MCNC: 8.8 MG/DL (ref 8.3–10.1)
CALCIUM SERPL-MCNC: 9.1 MG/DL (ref 8.3–10.1)
CHLORIDE SERPL-SCNC: 94 MMOL/L (ref 100–108)
CHLORIDE SERPL-SCNC: 95 MMOL/L (ref 100–108)
CO2 SERPL-SCNC: 32 MMOL/L (ref 21–32)
CO2 SERPL-SCNC: 35 MMOL/L (ref 21–32)
COLOR FLD: ABNORMAL
CREAT SERPL-MCNC: 1.38 MG/DL (ref 0.6–1.3)
CREAT SERPL-MCNC: 1.58 MG/DL (ref 0.6–1.3)
EOSINOPHIL # BLD MANUAL: 0.15 THOUSAND/UL (ref 0–0.4)
EOSINOPHIL NFR BLD MANUAL: 2 % (ref 0–6)
ERYTHROCYTE [DISTWIDTH] IN BLOOD BY AUTOMATED COUNT: 16.5 % (ref 11.6–15.1)
GFR SERPL CREATININE-BSD FRML MDRD: 43 ML/MIN/1.73SQ M
GFR SERPL CREATININE-BSD FRML MDRD: 50 ML/MIN/1.73SQ M
GIANT PLATELETS BLD QL SMEAR: PRESENT
GLUCOSE FLD-MCNC: 413 MG/DL
GLUCOSE SERPL-MCNC: 244 MG/DL (ref 65–140)
GLUCOSE SERPL-MCNC: 248 MG/DL (ref 65–140)
GLUCOSE SERPL-MCNC: 279 MG/DL (ref 65–140)
GLUCOSE SERPL-MCNC: 283 MG/DL (ref 65–140)
GLUCOSE SERPL-MCNC: 319 MG/DL (ref 65–140)
GLUCOSE SERPL-MCNC: 331 MG/DL (ref 65–140)
GLUCOSE SERPL-MCNC: 336 MG/DL (ref 65–140)
GLUCOSE SERPL-MCNC: 448 MG/DL (ref 65–140)
GLUCOSE SERPL-MCNC: 491 MG/DL (ref 65–140)
GLUCOSE SERPL-MCNC: >500 MG/DL (ref 65–140)
HCT VFR BLD AUTO: 34.7 % (ref 36.5–49.3)
HGB BLD-MCNC: 10.6 G/DL (ref 12–17)
HGB FLD-MCNC: 0.6 %
HISTIOCYTES NFR FLD: 10 %
INR PPP: 1.01 (ref 0.84–1.19)
LDH FLD L TO P-CCNC: 81 U/L
LDH SERPL-CCNC: 241 U/L (ref 81–234)
LYMPHOCYTES # BLD AUTO: 0.15 THOUSAND/UL (ref 0.6–4.47)
LYMPHOCYTES # BLD AUTO: 2 % (ref 14–44)
LYMPHOCYTES NFR BLD AUTO: 81 %
MAGNESIUM SERPL-MCNC: 2.1 MG/DL (ref 1.6–2.6)
MCH RBC QN AUTO: 24.8 PG (ref 26.8–34.3)
MCHC RBC AUTO-ENTMCNC: 30.5 G/DL (ref 31.4–37.4)
MCV RBC AUTO: 81 FL (ref 82–98)
MICROCYTES BLD QL AUTO: PRESENT
MONOCYTES # BLD AUTO: 0.07 THOUSAND/UL (ref 0–1.22)
MONOCYTES NFR BLD AUTO: 2 %
MONOCYTES NFR BLD: 1 % (ref 4–12)
NEUTROPHILS # BLD MANUAL: 6.82 THOUSAND/UL (ref 1.85–7.62)
NEUTS SEG NFR BLD AUTO: 7 %
NEUTS SEG NFR BLD AUTO: 92 % (ref 43–75)
NRBC BLD AUTO-RTO: 0 /100 WBCS
OVALOCYTES BLD QL SMEAR: PRESENT
PH BODY FLUID: 7.4
PHOSPHATE SERPL-MCNC: 3.2 MG/DL (ref 2.3–4.1)
PLATELET # BLD AUTO: 166 THOUSANDS/UL (ref 149–390)
PLATELET BLD QL SMEAR: ADEQUATE
PMV BLD AUTO: 10.4 FL (ref 8.9–12.7)
POIKILOCYTOSIS BLD QL SMEAR: PRESENT
POLYCHROMASIA BLD QL SMEAR: PRESENT
POTASSIUM SERPL-SCNC: 4.8 MMOL/L (ref 3.5–5.3)
POTASSIUM SERPL-SCNC: 5.4 MMOL/L (ref 3.5–5.3)
PROCALCITONIN SERPL-MCNC: 0.08 NG/ML
PROT FLD-MCNC: 4.8 G/DL
PROT SERPL-MCNC: 7.5 G/DL (ref 6.4–8.2)
PROTHROMBIN TIME: 13.3 SECONDS (ref 11.6–14.5)
RBC # BLD AUTO: 4.27 MILLION/UL (ref 3.88–5.62)
RBC MORPH BLD: PRESENT
SITE: ABNORMAL
SMUDGE CELLS BLD QL SMEAR: PRESENT
SODIUM SERPL-SCNC: 132 MMOL/L (ref 136–145)
SODIUM SERPL-SCNC: 135 MMOL/L (ref 136–145)
TOTAL CELLS COUNTED SPEC: 100
VARIANT LYMPHS # BLD AUTO: 3 %
WBC # BLD AUTO: 7.41 THOUSAND/UL (ref 4.31–10.16)
WBC # FLD MANUAL: 1255 /UL

## 2021-04-15 PROCEDURE — 87070 CULTURE OTHR SPECIMN AEROBIC: CPT | Performed by: INTERNAL MEDICINE

## 2021-04-15 PROCEDURE — 82948 REAGENT STRIP/BLOOD GLUCOSE: CPT

## 2021-04-15 PROCEDURE — 88305 TISSUE EXAM BY PATHOLOGIST: CPT | Performed by: PATHOLOGY

## 2021-04-15 PROCEDURE — 36415 COLL VENOUS BLD VENIPUNCTURE: CPT | Performed by: INTERNAL MEDICINE

## 2021-04-15 PROCEDURE — 87205 SMEAR GRAM STAIN: CPT | Performed by: INTERNAL MEDICINE

## 2021-04-15 PROCEDURE — 87205 SMEAR GRAM STAIN: CPT | Performed by: STUDENT IN AN ORGANIZED HEALTH CARE EDUCATION/TRAINING PROGRAM

## 2021-04-15 PROCEDURE — 84157 ASSAY OF PROTEIN OTHER: CPT | Performed by: STUDENT IN AN ORGANIZED HEALTH CARE EDUCATION/TRAINING PROGRAM

## 2021-04-15 PROCEDURE — 85014 HEMATOCRIT: CPT | Performed by: STUDENT IN AN ORGANIZED HEALTH CARE EDUCATION/TRAINING PROGRAM

## 2021-04-15 PROCEDURE — 84100 ASSAY OF PHOSPHORUS: CPT | Performed by: INTERNAL MEDICINE

## 2021-04-15 PROCEDURE — 71045 X-RAY EXAM CHEST 1 VIEW: CPT

## 2021-04-15 PROCEDURE — 94760 N-INVAS EAR/PLS OXIMETRY 1: CPT

## 2021-04-15 PROCEDURE — 84145 PROCALCITONIN (PCT): CPT | Performed by: INTERNAL MEDICINE

## 2021-04-15 PROCEDURE — 94640 AIRWAY INHALATION TREATMENT: CPT

## 2021-04-15 PROCEDURE — 85007 BL SMEAR W/DIFF WBC COUNT: CPT | Performed by: INTERNAL MEDICINE

## 2021-04-15 PROCEDURE — 80048 BASIC METABOLIC PNL TOTAL CA: CPT | Performed by: NURSE PRACTITIONER

## 2021-04-15 PROCEDURE — 99232 SBSQ HOSP IP/OBS MODERATE 35: CPT | Performed by: NURSE PRACTITIONER

## 2021-04-15 PROCEDURE — 80053 COMPREHEN METABOLIC PANEL: CPT | Performed by: INTERNAL MEDICINE

## 2021-04-15 PROCEDURE — 87070 CULTURE OTHR SPECIMN AEROBIC: CPT | Performed by: STUDENT IN AN ORGANIZED HEALTH CARE EDUCATION/TRAINING PROGRAM

## 2021-04-15 PROCEDURE — 99222 1ST HOSP IP/OBS MODERATE 55: CPT | Performed by: INTERNAL MEDICINE

## 2021-04-15 PROCEDURE — 89051 BODY FLUID CELL COUNT: CPT | Performed by: STUDENT IN AN ORGANIZED HEALTH CARE EDUCATION/TRAINING PROGRAM

## 2021-04-15 PROCEDURE — 83615 LACTATE (LD) (LDH) ENZYME: CPT | Performed by: STUDENT IN AN ORGANIZED HEALTH CARE EDUCATION/TRAINING PROGRAM

## 2021-04-15 PROCEDURE — 83735 ASSAY OF MAGNESIUM: CPT | Performed by: INTERNAL MEDICINE

## 2021-04-15 PROCEDURE — 88112 CYTOPATH CELL ENHANCE TECH: CPT | Performed by: PATHOLOGY

## 2021-04-15 PROCEDURE — 85027 COMPLETE CBC AUTOMATED: CPT | Performed by: INTERNAL MEDICINE

## 2021-04-15 PROCEDURE — 83986 ASSAY PH BODY FLUID NOS: CPT | Performed by: STUDENT IN AN ORGANIZED HEALTH CARE EDUCATION/TRAINING PROGRAM

## 2021-04-15 PROCEDURE — 85730 THROMBOPLASTIN TIME PARTIAL: CPT | Performed by: INTERNAL MEDICINE

## 2021-04-15 PROCEDURE — 32555 ASPIRATE PLEURA W/ IMAGING: CPT | Performed by: INTERNAL MEDICINE

## 2021-04-15 PROCEDURE — 85610 PROTHROMBIN TIME: CPT | Performed by: INTERNAL MEDICINE

## 2021-04-15 PROCEDURE — 82945 GLUCOSE OTHER FLUID: CPT | Performed by: STUDENT IN AN ORGANIZED HEALTH CARE EDUCATION/TRAINING PROGRAM

## 2021-04-15 PROCEDURE — 0W993ZZ DRAINAGE OF RIGHT PLEURAL CAVITY, PERCUTANEOUS APPROACH: ICD-10-PCS | Performed by: INTERNAL MEDICINE

## 2021-04-15 RX ORDER — BUDESONIDE 0.5 MG/2ML
0.5 INHALANT ORAL
Status: DISCONTINUED | OUTPATIENT
Start: 2021-04-15 | End: 2021-04-19 | Stop reason: HOSPADM

## 2021-04-15 RX ADMIN — INSULIN LISPRO 6 UNITS: 100 INJECTION, SOLUTION INTRAVENOUS; SUBCUTANEOUS at 17:13

## 2021-04-15 RX ADMIN — INSULIN HUMAN 35 UNITS: 500 INJECTION, SOLUTION SUBCUTANEOUS at 21:32

## 2021-04-15 RX ADMIN — FERROUS SULFATE TAB 325 MG (65 MG ELEMENTAL FE) 325 MG: 325 (65 FE) TAB at 08:20

## 2021-04-15 RX ADMIN — BUDESONIDE 0.5 MG: 0.5 INHALANT ORAL at 09:28

## 2021-04-15 RX ADMIN — LEVALBUTEROL HYDROCHLORIDE 1.25 MG: 1.25 SOLUTION, CONCENTRATE RESPIRATORY (INHALATION) at 13:56

## 2021-04-15 RX ADMIN — LEVALBUTEROL HYDROCHLORIDE 1.25 MG: 1.25 SOLUTION, CONCENTRATE RESPIRATORY (INHALATION) at 08:32

## 2021-04-15 RX ADMIN — METHYLPREDNISOLONE SODIUM SUCCINATE 40 MG: 40 INJECTION, POWDER, FOR SOLUTION INTRAMUSCULAR; INTRAVENOUS at 04:41

## 2021-04-15 RX ADMIN — INSULIN LISPRO 8 UNITS: 100 INJECTION, SOLUTION INTRAVENOUS; SUBCUTANEOUS at 23:49

## 2021-04-15 RX ADMIN — SODIUM CHLORIDE 500 ML: 0.9 INJECTION, SOLUTION INTRAVENOUS at 01:13

## 2021-04-15 RX ADMIN — POTASSIUM CHLORIDE 20 MEQ: 1500 TABLET, EXTENDED RELEASE ORAL at 08:20

## 2021-04-15 RX ADMIN — BUDESONIDE 0.5 MG: 0.5 INHALANT ORAL at 19:52

## 2021-04-15 RX ADMIN — OMEGA-3 FATTY ACIDS CAP 1000 MG 1000 MG: 1000 CAP at 08:20

## 2021-04-15 RX ADMIN — LEVALBUTEROL HYDROCHLORIDE 1.25 MG: 1.25 SOLUTION, CONCENTRATE RESPIRATORY (INHALATION) at 19:52

## 2021-04-15 RX ADMIN — GUAIFENESIN 600 MG: 600 TABLET, EXTENDED RELEASE ORAL at 17:12

## 2021-04-15 RX ADMIN — INSULIN LISPRO 12 UNITS: 100 INJECTION, SOLUTION INTRAVENOUS; SUBCUTANEOUS at 06:29

## 2021-04-15 RX ADMIN — INSULIN LISPRO 6 UNITS: 100 INJECTION, SOLUTION INTRAVENOUS; SUBCUTANEOUS at 14:18

## 2021-04-15 RX ADMIN — CYCLOBENZAPRINE HYDROCHLORIDE 10 MG: 10 TABLET, FILM COATED ORAL at 17:13

## 2021-04-15 RX ADMIN — INSULIN HUMAN 35 UNITS: 500 INJECTION, SOLUTION SUBCUTANEOUS at 00:55

## 2021-04-15 RX ADMIN — CYCLOBENZAPRINE HYDROCHLORIDE 10 MG: 10 TABLET, FILM COATED ORAL at 08:20

## 2021-04-15 RX ADMIN — ASPIRIN 81 MG: 81 TABLET, COATED ORAL at 08:20

## 2021-04-15 RX ADMIN — OMEGA-3 FATTY ACIDS CAP 1000 MG 1000 MG: 1000 CAP at 17:13

## 2021-04-15 RX ADMIN — OXYCODONE HYDROCHLORIDE AND ACETAMINOPHEN 1.5 TABLET: 5; 325 TABLET ORAL at 21:43

## 2021-04-15 RX ADMIN — ENOXAPARIN SODIUM 40 MG: 40 INJECTION SUBCUTANEOUS at 08:26

## 2021-04-15 RX ADMIN — CLOTRIMAZOLE: 0.01 CREAM TOPICAL at 18:50

## 2021-04-15 RX ADMIN — PANTOPRAZOLE SODIUM 40 MG: 40 TABLET, DELAYED RELEASE ORAL at 06:29

## 2021-04-15 RX ADMIN — METHYLPREDNISOLONE SODIUM SUCCINATE 40 MG: 40 INJECTION, POWDER, FOR SOLUTION INTRAMUSCULAR; INTRAVENOUS at 21:32

## 2021-04-15 RX ADMIN — IPRATROPIUM BROMIDE 0.5 MG: 0.5 SOLUTION RESPIRATORY (INHALATION) at 13:56

## 2021-04-15 RX ADMIN — INSULIN HUMAN 10 UNITS: 100 INJECTION, SOLUTION PARENTERAL at 01:02

## 2021-04-15 RX ADMIN — METOPROLOL TARTRATE 25 MG: 25 TABLET, FILM COATED ORAL at 08:20

## 2021-04-15 RX ADMIN — IPRATROPIUM BROMIDE 0.5 MG: 0.5 SOLUTION RESPIRATORY (INHALATION) at 08:32

## 2021-04-15 RX ADMIN — METOPROLOL TARTRATE 25 MG: 25 TABLET, FILM COATED ORAL at 17:12

## 2021-04-15 RX ADMIN — INSULIN HUMAN 45 UNITS: 500 INJECTION, SOLUTION SUBCUTANEOUS at 08:19

## 2021-04-15 RX ADMIN — METHYLPREDNISOLONE SODIUM SUCCINATE 40 MG: 40 INJECTION, POWDER, FOR SOLUTION INTRAMUSCULAR; INTRAVENOUS at 13:34

## 2021-04-15 RX ADMIN — FUROSEMIDE 40 MG: 40 TABLET ORAL at 08:20

## 2021-04-15 RX ADMIN — IPRATROPIUM BROMIDE 0.5 MG: 0.5 SOLUTION RESPIRATORY (INHALATION) at 19:52

## 2021-04-15 RX ADMIN — PRAVASTATIN SODIUM 80 MG: 80 TABLET ORAL at 17:13

## 2021-04-15 RX ADMIN — INSULIN LISPRO 12 UNITS: 100 INJECTION, SOLUTION INTRAVENOUS; SUBCUTANEOUS at 01:02

## 2021-04-15 RX ADMIN — CYCLOBENZAPRINE HYDROCHLORIDE 10 MG: 10 TABLET, FILM COATED ORAL at 21:32

## 2021-04-15 RX ADMIN — FUROSEMIDE 40 MG: 40 TABLET ORAL at 17:12

## 2021-04-15 RX ADMIN — TAMSULOSIN HYDROCHLORIDE 0.4 MG: 0.4 CAPSULE ORAL at 17:12

## 2021-04-15 NOTE — ASSESSMENT & PLAN NOTE
Lab Results   Component Value Date    EGFR 43 04/15/2021    EGFR 56 04/14/2021    EGFR 57 07/31/2020    CREATININE 1 58 (H) 04/15/2021    CREATININE 1 26 04/14/2021    CREATININE 1 26 07/31/2020   · creat baseline appears to be 1 2-1 5 range   · Repeat bmp now given concern for hemolyzed sample   · Avoid nephrotoxic drugs and hypotension

## 2021-04-15 NOTE — UTILIZATION REVIEW
Initial Clinical Review    Admission: Date/Time/Statement:   Admission Orders (From admission, onward)     Ordered        04/14/21 2054  Inpatient Admission  Once                   Orders Placed This Encounter   Procedures    Inpatient Admission     Standing Status:   Standing     Number of Occurrences:   1     Order Specific Question:   Level of Care     Answer:   Med Surg [16]     Order Specific Question:   Estimated length of stay     Answer:   More than 2 Midnights     Order Specific Question:   Certification     Answer:   I certify that inpatient services are medically necessary for this patient for a duration of greater than two midnights  See H&P and MD Progress Notes for additional information about the patient's course of treatment  ED Arrival Information     Expected Arrival Acuity Means of Arrival Escorted By Service Admission Type    - 4/14/2021 14:57 Emergent Walk-In Self General Medicine Emergency    Arrival Complaint    CHF,SOB        Chief Complaint   Patient presents with    Chest Pain     pt reports pain over his entire chest for the past couple days  pt has COPD and CHF and has increased SOB  Initial Presentation:   70 yom to ER from home c/o increased SOB with productive cough & precordial chest pain  Hx  COPD (O2 3ltr baseline), R pulmonary nodule and recurrent right pleural effusion  Presents tachycardic & tachypniec with pursed lips breathing, lungs with diminished breath sounds & wheezing noted  Admission work-up showing hyponatremia, blood sugar >500, opacities & R pleural effusion on imaging  Admitted to inpatient status for severe COPD  Started on IV steroids & nebs ATC, pulmonary consulted  O2 requirements up to 10ltr via aerosol mask  Date: 4/15/21   Day 2:   COPD exacerbation with moderate right pleural effusion in conjunction with adeno carcinoma  Current O2 requirements weaned to 3-4 ltr nc  Remains on IV steroids & nebs ATC   Lungs with diminished breath sounds with diffuse expiratory wheezing and right basilar rales  Per pulm:  Respiratory failure 2nd COPD exacerbation  Continue steroids  Thoracentesis performed at bedside for 1 7ltr sophy serosanguinous fluid       ED Triage Vitals   Temperature Pulse Respirations Blood Pressure SpO2   04/14/21 1542 04/14/21 1531 04/14/21 1531 04/14/21 1531 04/14/21 1531   98 3 °F (36 8 °C) (!) 113 (!) 24 132/65 98 %      Temp Source Heart Rate Source Patient Position - Orthostatic VS BP Location FiO2 (%)   04/14/21 1542 04/14/21 1531 04/14/21 2009 04/14/21 2009 --   Tympanic Monitor Sitting Left arm       Pain Score       04/14/21 2143       8          Wt Readings from Last 1 Encounters:   04/14/21 127 kg (279 lb)     Additional Vital Signs:   Date/Time  Temp  Pulse  Resp  BP  MAP (mmHg)  SpO2  Calculated FIO2 (%) - Nasal Cannula  O2 Flow Rate (L/min)  Nasal Cannula O2 Flow Rate (L/min)  O2 Device  Patient Position - Orthostatic VS   04/15/21 1100  97 7 °F (36 5 °C)  89  20  136/62  89  95 %  36  --  4 L/min  Nasal cannula  Sitting   04/15/21 0832  --  --  --  --  --  95 %  36  --  4 L/min  Nasal cannula  --   04/15/21 0825  --  84  20  170/74  107  95 %  --  --  --  None (Room air)  Sitting   04/15/21 0819  --  82  --  170/74  --  --  --  --  --  --  --   04/15/21 0545  --  96  20  137/64  92  96 %  32  --  3 L/min  Nasal cannula  Lying   04/15/21 0445  --  90  20  137/60  86  97 %  32  --  3 L/min  Nasal cannula  Lying   04/15/21 0245  --  98  18  145/66  95  98 %  32  --  3 L/min  Nasal cannula  Lying   04/15/21 0145  --  96  20  139/63  91  98 %  32  --  3 L/min  Nasal cannula  Lying   04/14/21 2240  --  --  22  --  --  91 %  --  --  --  --  --   04/14/21 2230  --  106Abnormal   20  141/55  79  98 %  32  --  3 L/min  Nasal cannula  Lying   04/14/21 2009  --  102  20  155/76  --  98 %  --  10 L/min  --  Aerosol mask  Sitting   04/14/21 1957  --  --  --  --  --  91 %  --  --  --  --  --   04/14/21 1800  --  102  24Abnormal 174/74Abnormal   106  96 %  --  --  --  --  --     Pertinent Labs/Diagnostic Test Results:   Results from last 7 days   Lab Units 04/14/21  1642   SARS-COV-2  Negative     Results from last 7 days   Lab Units 04/15/21  0441 04/14/21  1541   WBC Thousand/uL 7 41 8 44   HEMOGLOBIN g/dL 10 6* 11 3*   HEMATOCRIT % 34 7* 37 7   PLATELETS Thousands/uL 166 147*   NEUTROS ABS Thousands/µL  --  7 02     Results from last 7 days   Lab Units 04/15/21  0442 04/14/21  1541   SODIUM mmol/L 132* 134*   POTASSIUM mmol/L 5 4* 4 9   CHLORIDE mmol/L 94* 97*   CO2 mmol/L 32 35*   ANION GAP mmol/L 6 2*   BUN mg/dL 24 18   CREATININE mg/dL 1 58* 1 26   EGFR ml/min/1 73sq m 43 56   CALCIUM mg/dL 8 8 8 8   MAGNESIUM mg/dL 2 1  --    PHOSPHORUS mg/dL 3 2  --      Results from last 7 days   Lab Units 04/15/21  0442 04/14/21  1541   AST U/L 23 21   ALT U/L 15 16   ALK PHOS U/L 80 89   TOTAL PROTEIN g/dL 7 5 7 7   ALBUMIN g/dL 2 9* 3 0*   TOTAL BILIRUBIN mg/dL 0 34 0 29     Results from last 7 days   Lab Units 04/15/21  1133 04/15/21  0735 04/15/21  0212 04/15/21  0042   POC GLUCOSE mg/dl 244* 319* 491* >500*     Results from last 7 days   Lab Units 04/15/21  0442 04/14/21  1541   GLUCOSE RANDOM mg/dL 448* 319*     Results from last 7 days   Lab Units 04/14/21  2139   HEMOGLOBIN A1C % 9 2*   EAG mg/dl 217     BETA-HYDROXYBUTYRATE   Date Value Ref Range Status   06/15/2019 0 2 <0 6 mmol/L Final      Results from last 7 days   Lab Units 04/14/21  1541   TROPONIN I ng/mL <0 02     Results from last 7 days   Lab Units 04/15/21  0442   PROTIME seconds 13 3   INR  1 01   PTT seconds 28     Results from last 7 days   Lab Units 04/15/21  0442   PROCALCITONIN ng/ml 0 08     Results from last 7 days   Lab Units 04/14/21  2228   CLARITY UA  Clear   COLOR UA  Yellow   SPEC GRAV UA  1 026   PH UA  5 5   GLUCOSE UA mg/dl >=1000 (1%)*   KETONES UA mg/dl Negative   BLOOD UA  Negative   PROTEIN UA mg/dl 30 (1+)*   NITRITE UA  Negative   BILIRUBIN UA  Negative UROBILINOGEN UA E U /dl 1 0   LEUKOCYTES UA  Elevated glucose may cause decreased leukocyte values  See urine microscopic for [facility] result/*   WBC UA /hpf None Seen   RBC UA /hpf None Seen   BACTERIA UA /hpf None Seen   EPITHELIAL CELLS WET PREP /hpf None Seen     Results from last 7 days   Lab Units 04/14/21  1642   INFLUENZA A PCR  Negative   INFLUENZA B PCR  Negative   RSV PCR  Negative     Results from last 7 days   Lab Units 04/15/21  0453   GRAM STAIN RESULT  1+ Epithelial cells per low power field*  No Polys*  2+ Gram positive cocci in pairs*  1+ Gram negative rods*     4/14  Cxr=  Right lung opacity corresponding with right upper lobe pneumonia, right base rounded atelectasis, and moderate effusion per comparison with chest CT   cta chest pe study=  1  Technically limited exam due to respiratory motion artifact  No evidence of proximal pulmonary artery embolus, thoracic aortic aneurysm or dissection  2   Moderate right pleural effusion  3   Mild compressive atelectasis in the right lung  Few airspace opacities in the upper lung could represent mild aspiration or pneumonia  Ekg=  Ectopic atrial rhythm  Right bundle branch block    4/15  Cxr=  Decreased right pleural effusion with persistent consolidation throughout the right lung and volume loss with shift of the mediastinum to the right  No discernible pneumothorax      ED Treatment:   Medication Administration from 04/14/2021 1457 to 04/15/2021 1105       Date/Time Order Dose Route Action     04/14/2021 1616 albuterol (PROVENTIL HFA,VENTOLIN HFA) inhaler 2 puff 2 puff Inhalation Given     04/14/2021 1610 methylPREDNISolone sodium succinate (Solu-MEDROL) injection 125 mg 125 mg Intravenous Given     04/14/2021 1955 albuterol inhalation solution 10 mg 10 mg Nebulization Given     04/14/2021 1954 ipratropium (ATROVENT) 0 02 % inhalation solution 1 mg 1 mg Nebulization Given     04/14/2021 1955 sodium chloride 0 9 % inhalation solution 3 mL 3 mL Nebulization Given     04/14/2021 2002 iohexol (OMNIPAQUE) 350 MG/ML injection (MULTI-DOSE) 100 mL 100 mL Intravenous Given     04/15/2021 0820 aspirin (ECOTRIN LOW STRENGTH) EC tablet 81 mg 81 mg Oral Given     04/15/2021 0820 cyclobenzaprine (FLEXERIL) tablet 10 mg 10 mg Oral Given     04/14/2021 2139 cyclobenzaprine (FLEXERIL) tablet 10 mg 10 mg Oral Given     04/15/2021 0820 ferrous sulfate tablet 325 mg 325 mg Oral Given     04/15/2021 0820 furosemide (LASIX) tablet 40 mg 40 mg Oral Given     04/14/2021 2218 furosemide (LASIX) tablet 40 mg 40 mg Oral Given     04/15/2021 0819 insulin regular (HumuLIN R U-500) 500 units/mL CONCENTRATED injection 45 Units 45 Units Subcutaneous Given     04/15/2021 0820 metoprolol tartrate (LOPRESSOR) tablet 25 mg 25 mg Oral Given     04/14/2021 2139 metoprolol tartrate (LOPRESSOR) tablet 25 mg 25 mg Oral Given     04/15/2021 0820 fish oil capsule 1,000 mg 1,000 mg Oral Given     04/14/2021 2143 oxyCODONE-acetaminophen (PERCOCET) 5-325 mg per tablet 1 5 tablet 1 5 tablet Oral Given     04/15/2021 0629 pantoprazole (PROTONIX) EC tablet 40 mg 40 mg Oral Given     04/15/2021 0820 potassium chloride (K-DUR,KLOR-CON) CR tablet 20 mEq 20 mEq Oral Given     04/14/2021 2139 acetaminophen (TYLENOL) tablet 650 mg 650 mg Oral Given     04/14/2021 2139 docusate sodium (COLACE) capsule 100 mg 100 mg Oral Given     04/15/2021 0441 methylPREDNISolone sodium succinate (Solu-MEDROL) injection 40 mg 40 mg Intravenous Given     04/14/2021 2140 methylPREDNISolone sodium succinate (Solu-MEDROL) injection 40 mg 40 mg Intravenous Given     04/15/2021 0826 enoxaparin (LOVENOX) subcutaneous injection 40 mg 40 mg Subcutaneous Given     04/15/2021 0629 insulin lispro (HumaLOG) 100 units/mL subcutaneous injection 2-12 Units 12 Units Subcutaneous Given     04/15/2021 0102 insulin lispro (HumaLOG) 100 units/mL subcutaneous injection 2-12 Units 12 Units Subcutaneous Given     04/15/2021 0839 levalbuterol [facility]) inhalation solution 1 25 mg 1 25 mg Nebulization Given     04/15/2021 0832 ipratropium (ATROVENT) 0 02 % inhalation solution 0 5 mg 0 5 mg Nebulization Given     04/15/2021 0055 insulin regular (HumuLIN R U-500) 500 units/mL CONCENTRATED injection 35 Units 35 Units Subcutaneous Given     04/15/2021 0113 sodium chloride 0 9 % bolus 500 mL 500 mL Intravenous New Bag     04/15/2021 0102 insulin regular (HumuLIN R,NovoLIN R) injection 10 Units 10 Units Intravenous Given     04/15/2021 0928 budesonide (PULMICORT) inhalation solution 0 5 mg 0 5 mg Nebulization Given        Past Medical History:   Diagnosis Date    Abdominal pain     Cardiac disease     CHF (congestive heart failure) (HCC)     COPD, severe (HCC)     Coronary artery disease     DDD (degenerative disc disease), lumbar 9/1/2020    Diabetes mellitus ([facility] )     Dyspnea     GERD (gastroesophageal reflux disease)     Hyperlipidemia     Hypertension     MI (myocardial infarction) ([facility] [facility] )     with 3 stents    Nodule of apex of right lung     JACQUELINE (obstructive sleep apnea)     [facility]      Present on Admission:   Obesity (BMI 30-39  9)   Dyslipidemia   Pleural effusion on right   COPD, severe (HCC)   Chronic diastolic heart failure (HCC)   Primary adenocarcinoma of upper lobe of right lung ([facility] )   Essential hypertension   DDD (degenerative disc disease), lumbar   Chronic kidney disease, stage 3 (HCC)    Admitting Diagnosis: Chest pain [R07 9]  COPD with acute exacerbation (HCC) [J44 1]  Recurrent right pleural effusion [J90]  Age/Sex: 70 y o  male  Admission Orders:  Contact & airborne isolation  Cont pulse ox  accuchecks  Consult pulmonary  scd    Scheduled Medications:  aspirin, 81 mg, Oral, Daily  budesonide, 0 5 mg, Nebulization, Q12H  clotrimazole, , Topical, BID  cyclobenzaprine, 10 mg, Oral, TID  enoxaparin, 40 mg, Subcutaneous, Daily  ferrous sulfate, 325 mg, Oral, Daily With Breakfast  fish oil, 1,000 mg, Oral, BID  furosemide, 40 mg, Oral, BID  guaiFENesin, 600 mg, Oral, BID  insulin lispro, 2-12 Units, Subcutaneous, Q6H [facility]  insulin regular, 35 Units, Subcutaneous, HS  insulin regular, 45 Units, Subcutaneous, BID AC  ipratropium, 0 5 mg, Nebulization, TID  levalbuterol, 1 25 mg, Nebulization, TID  methylPREDNISolone sodium succinate, 40 mg, Intravenous, Q8H  metoprolol tartrate, 25 mg, Oral, BID  pantoprazole, 40 mg, Oral, Daily Before Breakfast  pravastatin, 80 mg, Oral, Daily With Dinner  tamsulosin, 0 4 mg, Oral, Daily With Dinner    PRN Meds:  acetaminophen, 650 mg, Oral, Q6H PRN  aluminum-magnesium hydroxide-simethicone, 30 mL, Oral, Q6H PRN  docusate sodium, 100 mg, Oral, BID PRN  levalbuterol, 1 25 mg, Nebulization, Q6H PRN    And  sodium chloride, 3 mL, Nebulization, Q6H PRN  ondansetron, 4 mg, Intravenous, Q6H PRN  oxyCODONE-acetaminophen, 1 5 tablet, Oral, Q6H PRN    Network Utilization Review Department  ATTENTION: Please call with any questions or concerns to [phone] and carefully listen to the prompts so that you are directed to the right person  All voicemails are confidential   [place] Doing all requests for admission clinical reviews, approved or denied determinations and any other requests to dedicated fax number below belonging to the campus where the patient is receiving treatment   List of dedicated fax numbers for the Facilities:  [address] DENIALS (Administrative/Medical Necessity) [phone]   [address] (Maternity/NICU/Pediatrics) [phone]   [address] [address] [facility] [address] Garcia [address] (  Lavern Castro "Swetha" 103) [address] [facility] [phone] 244 [facility] 928 [facility] [phone]   [address] [place] [address] [phone]

## 2021-04-15 NOTE — H&P
[address] [facility]  H&P- Dakota Ibarra   1945, 70 y o  male MRN: [id number]  Unit/Bed#: ED 11 Encounter: [id number]  Primary Care Provider: Anu Paulson MD   Date and time admitted to hospital: 4/14/2021  3:19 PM    Pleural effusion on right  Assessment & Plan  According to patient this is a recurrent finding and was drained approximately 2 months ago approximately 1 5 gal was taken  He does have a history of right adeno carcinoma and sees Radiation Oncology (Ilir) as well as Pulmonary (Pranav)  NPO post midnight  Interventional radiology consult for removal of pleural effusion  Patient is on Breo 100/25 daily  Start guaifenesin 600 mg twice daily  Continue Atrovent Xopenex  Given Solu-Medrol 125 mg; will continue with 40 mg daily  Primary adenocarcinoma of upper lobe of right lung [facility])  Assessment & Plan  Will ask opinion of pulmonary and removal of pleural effusion by IR  NPO post midnight  DDD (degenerative disc disease), lumbar  Assessment & Plan  On chronic oxycodone and Ultram   Will continue  Essential hypertension  Assessment & Plan  Continue metoprolol tartrate 25 mg twice daily  On Lasix 40 mg twice daily  Chronic diastolic heart failure (HCC)  Assessment & Plan  Wt Readings from Last 3 Encounters:   04/14/21 127 kg (279 lb)   03/12/21 127 kg (279 lb)   11/20/20 122 kg (270 lb)     Current weight is stable at 127 kilos  Most likely current difficulty with breathing is secondary to asthma exacerbation plan as the accumulation of moderate right pleural effusion in conjunction with adeno carcinoma  COPD, severe [facility])  Assessment & Plan  Pulmonary consult as noted  Continue Xopenex Atrovent nebulizations  Respiratory protocol  Continue intravenous steroid treatment:  Solu-Medrol 40 mg Q 8  Add Mucinex 600 mg q 12        Type 2 diabetes mellitus with hyperglycemia, with long-term current use of insulin [facility])  Assessment & Plan  Lab Results   Component Value Date    HGBA1C 9 9 (A) 03/12/2021       No results for input(s): POCGLU in the last 72 hours  Blood Sugar Average: Last 72 hrs:   continue Humulin U 545 units twice daily with sliding scale and Accu-Cheks per protocol  Dyslipidemia  Assessment & Plan  Currently on Pravachol 80 mg daily  Obesity (BMI 30-39  9)  Assessment & Plan  Patient would need nutritional support and advise regarding weight loss as out patient  VTE Prophylaxis: Enoxaparin (Lovenox)  / sequential compression device   Code Status: Level 1 - Full Code full Code as discussed  POLST: There is no POLST form on file for this patient (pre-hospital)    Anticipated Length of Stay:  Patient will be admitted on an Inpatient basis with an anticipated length of stay of  greater than 2 midnights  Justification for Hospital Stay: Please see detailed plans noted above  Chief Complaint:     Increasing shortness  History of Present Illness:  Michelle Singleton Sr  is a 70 y o  male who of breath has a past medical history significant for COPD along with a right pulmonary nodule and recurrent right pleural effusion  Patient also sees Pulmonary for the follow-up of COPD as well as the pulmonary nodule along with radiologic oncology and recently was given 5 treatments of radiation  Patient was doing quite well until approximately within 24 to 48 hours of increasing shortness of breath for which she described tightening as well as postnasal drip with itchy throat  No sore throat  No fever  Patient denies any weight gain recently  No abdominal pain  No nausea or vomiting  Here in the emergency room he received Solu-Medrol with a nebulization of albuterol/ipratropium  There is significant improvement of respiratory status  Currently he is resting on bed  He only complains of back pain which is usual for him         Review of Systems:    Constitutional:  Denies fever or chills   Eyes:  Denies change in visual acuity HENT:  Denies nasal congestion or sore throat   Respiratory:  Nonproductive cough, pleuritic throat, shortness of breath as noted   Cardiovascular:  Denies chest pain or edema   GI:  Denies abdominal pain, nausea, vomiting, bloody stools or diarrhea   :  Denies dysuria   Musculoskeletal:  Denies back pain or joint pain   Integument:  Denies rash   Neurologic:  Denies headache, focal weakness or sensory changes   Endocrine:  Denies polyuria or polydipsia   Lymphatic:  Denies swollen glands   Psychiatric:  Denies depression or anxiety     Past Medical and Surgical History:   Past Medical History:   Diagnosis Date    Abdominal pain     Cardiac disease     CHF (congestive heart failure) (HCC)     COPD, severe (HCC)     Coronary artery disease     DDD (degenerative disc disease), lumbar 9/1/2020    Diabetes mellitus ([facility] [address] )     Dyspnea     GERD (gastroesophageal reflux disease)     Hyperlipidemia     Hypertension     MI (myocardial infarction) ([facility] [address] )     with 3 stents    Nodule of apex of right lung     JACQUELINE (obstructive sleep apnea)     Prostate cancer [facility])      Past Surgical History:   Procedure Laterality Date    ABDOMINAL SURGERY      exploratory    ANGIOPLASTY      3 stents    APPENDECTOMY      COLONOSCOPY  2015    CT NEEDLE BIOPSY LUNG  11/3/2020    ESOPHAGOGASTRODUODENOSCOPY N/A 10/2/2017    Procedure: ESOPHAGOGASTRODUODENOSCOPY (EGD); Surgeon: Goldy Reece MD;  Location: [facility]; Service: Gastroenterology    IR THORACENTESIS  11/3/2020    KNEE CARTILAGE SURGERY      OTHER SURGICAL HISTORY      stent placement    PROSTATE SURGERY      SKIN GRAFT      Basal cell CA back       Meds/Allergies:  (Not in a hospital admission)    (ECOTRIN LOW STRENGTH) 81 mg EC tablet Take 1 tablet by mouth daily Arnol David MD Reordered   Ordered as: aspirin (ECOTRIN LOW STRENGTH) EC tablet 81 mg - 81 mg, Oral, Daily, First dose on u 4/15/21 at 0900 Do Not Crush - Enteric coated      cyclobenzaprine (FLEXERIL) 10 mg tablet Take 1 tablet (10 mg total) by mouth 3 (three) times a day Denice Clinton MD Reordered   Ordered as: cyclobenzaprine (FLEXERIL) tablet 10 mg - 10 mg, Oral, 3 times daily, First dose on Wed 4/14/21 at 2100   ferrous sulfate 325 (65 Fe) mg tablet Take 325 mg by mouth daily with breakfast Denice Clinton MD Reordered   Ordered as: ferrous sulfate tablet 325 mg - 325 mg, Oral, Daily with breakfast, First dose on Thu 4/15/21 at [facility] and drug interaction, teaching and documentation must be done; Administer 2 hours before or 4 hours after antacids; Avoid administration w/ cereals,dietary fiber,tea,coffee,eggs,or milk  furosemide (LASIX) 40 mg tablet TAKE ONE TABLET BY MOUTH TWICE A DAY Denice Clinton MD Reordered   Ordered as: furosemide (LASIX) tablet 40 mg - 40 mg, Oral, 2 times daily, First dose on Wed 4/14/21 at 2100 LOOK ALIKE SOUND ALIKE MED Hold for systolic blood pressure less than (mmHg): 110   insulin regular (HumuLIN R U-500) 500 units/mL CONCENTRATED injection Inject 0 09 mL (45 Units total) under the skin 2 (two) times a day before meals And 35 units at bedtime Denice Clinton MD Reordered   Ordered as: insulin regular (HumuLIN R U-500) 500 units/mL CONCENTRATED injection 45 Units - 45 Units, Subcutaneous, 2 times daily before meals, First dose on Wed 4/14/21 at 2100 High Alert Medication; **DISPOSE IN 8 GALLON BLACK CONTAINER**  LOOK ALIKE SOUND ALIKE MED  Insulin Syringe/Needle U-500 (BD Insulin Syringe U-500) 31G X 6MM 0 5 ML MISC USE 1 SYRINGE THREE TIMES A DAY FOR INJECTING INSULIN Denice Clinton MD Reconciliation Not Required   ketoconazole (NIZORAL) 2 % cream ketoconazole 2 % topical cream Denice Clinton MD Reordered   Ordered as: clotrimazole (LOTRIMIN) 1 % cream - Topical, 2 times daily, First dose on Wed 4/14/21 at 2100 For external use only   What is the location for this topical application? drying skin   levalbuterol (XOPENEX) 1 25 mg/3 mL nebulizer solution USE 1 VIAL IN NEBULIZER THREE TIMES A DAY Alon Koehler MD Reordered   Ordered as: levalbuterol (XOPENEX) inhalation solution 1 25 mg - 1 25 mg, Nebulization, Every 6 hours PRN, wheezing, Starting Wed 4/14/21 at 2044   metoprolol tartrate (LOPRESSOR) 25 mg tablet TAKE ONE TABLET BY MOUTH TWICE A DAY Alon Koehler MD Reordered   Ordered as: metoprolol tartrate (LOPRESSOR) tablet 25 mg - 25 mg, Oral, 2 times daily, First dose on Wed 4/14/21 at 2100 Hold for heart rate less than 50 beats per minute  LOOK ALIKE SOUND ALIKE MED Hold for systolic blood pressure less than (mmHg): 110   nitroglycerin (NITROSTAT) 0 4 mg SL tablet Place 1 tablet (0 4 mg total) under the tongue every 5 (five) minutes as needed for chest pain Alon Koehler MD Not Ordered    Patient not taking: Reported on 11/20/2020     Omega-3 Fatty Acids (FISH OIL) 645 MG CAPS Take 1 capsule by mouth 2 (two) times a day Alon Koehler MD Reordered   Ordered as: fish oil capsule 1,000 mg - 1,000 mg, Oral, 2 times daily, First dose on Wed 4/14/21 at 2100 Swallow whole; do not break, crush, dissolve, or chew  ONE TOUCH ULTRA TEST test strip TEST FOUR TIMES A DAY Alon Koehler MD Not Ordered   oxyCODONE-acetaminophen (PERCOCET) 7 5-325 MG per tablet Take by mouth every 4 (four) hours Alon Koehler MD Reordered   Ordered as: oxyCODONE-acetaminophen (PERCOCET) 5-325 mg per tablet 1 5 tablet - 1 5 tablet, Oral, Every 6 hours PRN, severe pain, Starting Wed 4/14/21 at 2046 High alert medication  LOOK ALIKE SOUND ALIKE MED    pantoprazole (PROTONIX) 40 mg tablet TAKE ONE TABLET BY MOUTH EVERY DAY Alon Koehler MD Reordered   Ordered as: pantoprazole (PROTONIX) EC tablet 40 mg - 40 mg, Oral, Daily before breakfast, First dose on Thu 4/15/21 at [address] [facility] [address] whole; do not crush, chew or split   LOOK ALIKE SOUND ALIKE MED    potassium chloride (K-DUR,KLOR-CON) 20 mEq tablet TAKE 1 TABLET BY MOUTH EVERY DAY Alon Koehler MD Reordered Ordered as: potassium chloride (K-DUR,KLOR-CON) CR tablet 20 mEq - 20 mEq, Oral, Daily, First dose on Thu 4/15/21 at 0900 Swallow whole; do not crush or chew  Tablet may be split in half to facilitate swallowing     simvastatin (ZOCOR) 40 mg tablet TAKE ONE TABLET BY MOUTH EVERY DAY Linh Lopez MD Reordered   Ordered as: pravastatin (PRAVACHOL) tablet 80 mg - 80 mg, Oral, Daily with dinner, First dose on Thu 4/15/21 at 1630   tamsulosin (FLOMAX) 0 4 mg TAKE 1 CAPSULE BY MOUTH EVERY DAY Linh Lopez MD Reordered   Ordered as: tamsulosin (FLOMAX) capsule 0 4 mg - 0 4 mg, Oral, Daily, First dose on Thu 4/15/21 at 0900 Swallow whole; do not crush, chew, or open    traMADol (ULTRAM) 50 mg tablet tramadol 50 mg tablet Linh Lopez MD Reordered   Ordered as: traMADol Urmila Lights) tablet 50 mg - 50 mg, Oral, Every 6 hours PRN, moderate pain, Starting 21 at 2046 High Alert Medication  LOOK ALIKE SOUND ALIKE MED    Trelegy Ellipta 100-62 5-25 MCG/INH inhaler INHALE ONE PUFF BY MOUTH EVERY DAY Linh Lopez MD Reordered   Ordered as: fluticasone-vilanterol (BREO ELLIPTA) 100-25 mcg/inh inhaler 1 puff - 1 puff, Inhalation, Daily, First dose on Thu 4/15/21 at 0900 Rinse mouth after use  Allergies: Allergies   Allergen Reactions    Crestor [Rosuvastatin]     Metformin GI Intolerance     History:  Marital Status: /Civil Union   Occupation:  He is retired  Patient Pre-hospital Living Situation:  Currently lives at home    Patient Pre-hospital Level of Mobility:  Ambulatory  Patient Pre-hospital Diet Restrictions:  Diabetic  Substance Use History:   Social History     Substance and Sexual Activity   Alcohol Use Not Currently    Frequency: Never    Binge frequency: Never     Social History     Tobacco Use   Smoking Status Former Smoker    Packs/day: 1 50    Years: 50 00    Pack years: 75 00    Quit date: 2020    Years since quittin 7   Smokeless Tobacco Never Used     Social History     Substance and Sexual Activity   Drug Use No       Family History:  Family History   Problem Relation Age of Onset    Heart disease Father     Other Father         Mesothelioma        Physical Exam:     Vitals:   Blood Pressure: 155/76 (04/14/21 2009)  Pulse: 102 (04/14/21 2009)  Temperature: 98 3 °F (36 8 °C) (04/14/21 1542)  Temp Source: Tympanic (04/14/21 1542)  Respirations: 20 (04/14/21 2009)  Weight - Scale: 127 kg (279 lb) (04/14/21 1531)  SpO2: 98 % (04/14/21 2009)    Constitutional:  Well developed, well nourished, mild distress however can speak much better than previous, non-toxic appearance   Eyes:  PERRL, conjunctiva normal   HENT:  Atraumatic, external ears normal, nose normal, oropharynx moist, no pharyngeal exudates  Neck- normal range of motion, no tenderness, supple   Respiratory:  No respiratory distress, decreased breath sounds on the right lung field, no rales, wheezing and rhonchi all over  Cardiovascular:  Normal rate, normal rhythm, no murmurs, no gallops, no rubs   GI:  Soft, nondistended, normal bowel sounds, nontender, no organomegaly, no mass, no rebound, no guarding   :  No costovertebral angle tenderness   Musculoskeletal:  No edema, no tenderness, no deformities  Back- no tenderness  Integument:  Well hydrated, no rash   Lymphatic:  No lymphadenopathy noted   Neurologic:  Alert &awake, communicative, CN 2-12 normal, normal motor function, normal sensory function, no focal deficits noted   Psychiatric:  Speech and behavior appropriate       Lab Results: I have personally reviewed pertinent reports        Results from last 7 days   Lab Units 04/14/21  1541   WBC Thousand/uL 8 44   HEMOGLOBIN g/dL 11 3*   HEMATOCRIT % 37 7   PLATELETS Thousands/uL 147*   NEUTROS PCT % 83*   LYMPHS PCT % 9*   MONOS PCT % 6   EOS PCT % 1     Results from last 7 days   Lab Units 04/14/21  1541   POTASSIUM mmol/L 4 9   CHLORIDE mmol/L 97*   CO2 mmol/L 35*   BUN mg/dL 18   CREATININE mg/dL 1 26   CALCIUM mg/dL 8 8   ALK PHOS U/L 89   ALT U/L 16   AST U/L 21               Imaging: I have personally reviewed pertinent reports  Cta Ed Chest Pe Study    Result Date: 4/14/2021  Narrative: CTA - CHEST WITH IV CONTRAST - PULMONARY ANGIOGRAM INDICATION: Pleuritic chest pain and shortness of breath  COMPARISON: 11/3/2020  TECHNIQUE: CTA examination of the chest was performed using angiographic technique according to a protocol specifically tailored to evaluate for pulmonary embolism  Axial, sagittal, and coronal 2D reformatted images were created from the source data and  submitted for interpretation  In addition, coronal 3D MIP postprocessing was performed on the acquisition scanner  Radiation dose length product (DLP) for this visit:  [id number] mGy-cm   This examination, like all CT scans performed in the [facility], was performed utilizing techniques to minimize radiation dose exposure, including the use of iterative reconstruction and automated exposure control  IV Contrast:  100 mL of iohexol (OMNIPAQUE)  FINDINGS: PULMONARY ARTERIAL TREE:  Significant respiratory motion artifact limits evaluation of the pulmonary arteries  No evidence of proximal pulmonary artery embolus  Distal order branches are not diagnostically evaluated  LUNGS:  Compressive atelectasis in the right lung  Few patchy airspace opacities in the right upper lung  Previously demonstrated lesion in the left upper lobe is no longer visualized  There is no tracheal or endobronchial lesion  PLEURA:  Moderate right pleural effusion  HEART/GREAT VESSELS:  Right shift of the heart and mediastinum due to volume loss in the right lung  Mild cardiomegaly  No thoracic aortic aneurysm or dissection  MEDIASTINUM AND JEREMÍAS:  No lymphadenopathy  CHEST WALL AND LOWER NECK:   Unremarkable  VISUALIZED STRUCTURES IN THE UPPER ABDOMEN:  Unremarkable  OSSEOUS STRUCTURES:  No acute fracture or destructive osseous lesion  Impression: 1  Technically limited exam due to respiratory motion artifact  No evidence of proximal pulmonary artery embolus, thoracic aortic aneurysm or dissection  2   Moderate right pleural effusion  3   Mild compressive atelectasis in the right lung  Few airspace opacities in the upper lung could represent mild aspiration or pneumonia  Workstation performed: [id number]         ** Please Note: Dragon 360 Dictation voice to text software was used in the creation of this document   **

## 2021-04-15 NOTE — ASSESSMENT & PLAN NOTE
Pulmonary consult as noted  Continue Xopenex Atrovent nebulizations  Respiratory protocol  Continue intravenous steroid treatment:  Solu-Medrol 40 mg Q 8  Add Mucinex 600 mg q 12

## 2021-04-15 NOTE — PROGRESS NOTES
[address] [facility]  Progress Note - Santana Mckenna Sr  1945, 70 y o  male MRN: [id number]  Unit/Bed#: ED 11 Encounter: [id number]  Primary Care Provider: Keren Van MD   Date and time admitted to hospital: 4/14/2021  3:19 PM    * COPD, severe ([address] )  Assessment & Plan  · CTA chest 4/14- Moderate right pleural effusion  Mild compressive atelectasis in the right lung   Few airspace opacities in the upper lung could represent mild aspiration or pneumonia  · procal x1 negative   · Pulmonary consult  · Continue nebs  · Respiratory protocol  · Continue intravenous steroid treatment:  Solu-Medrol 40 mg Q 8   · Mucinex 600 mg q 12     · C/w home o2 at [facility]    Pleural effusion on right  Assessment & Plan  · CTA chest 4/14- Moderate right pleural effusion  · According to patient this is a recurrent finding and was drained approximately 2 months ago approximately 1 5 gal was taken  He does have a history of right adeno carcinoma and sees Radiation Oncology (Ilir) as well as Pulmonary (Pranav)  · pulm consult placed- plan to do thoracentesis at bedside if able     Chronic diastolic heart failure [facility])  Assessment & Plan  Wt Readings from Last 3 Encounters:   04/14/21 127 kg (279 lb)   03/12/21 127 kg (279 lb)   11/20/20 122 kg (270 lb)     · Current weight is stable at 127 kilos  Most likely current difficulty with breathing is secondary to asthma exacerbation plan as the accumulation of moderate right pleural effusion in conjunction with adeno carcinoma    · C/w lasix 40mg BID   · Follows outpatient with Dr Mabel Hays       Type 2 diabetes mellitus with hyperglycemia, with long-term current use of insulin [facility])  Assessment & Plan  Lab Results   Component Value Date    HGBA1C 9 2 (H) 04/14/2021       Recent Labs     04/15/21  0042 04/15/21  0212 04/15/21  0735   POCGLU >500* 491* 319*       Blood Sugar Average: Last 72 hrs:  (P) 405     · Uncontrolled hyperglycemia  · continue Humulin U 45 units twice daily with sliding scale and Accu-Cheks per protocol  · Monitor glucose closely with steroids     Hyperkalemia  Assessment & Plan  · Noted on am labs   · Sample was commented to be hemolyzed   · Repeat bmp now and hold K supplements for now     Chronic respiratory failure with hypoxia ([address] )  Assessment & Plan  · In the setting of severe COPD   · C/w 3LNC which he is on at home     Primary adenocarcinoma of upper lobe of right lung [facility])  Assessment & Plan  Pulmonary consult placed     DDD (degenerative disc disease), lumbar  Assessment & Plan  · PDMP reviewed   · Last RX for oxy 5mg filled on 7/31/2020    Essential hypertension  Assessment & Plan  Continue metoprolol tartrate 25 mg twice daily  On Lasix 40 mg twice daily  Chronic kidney disease, stage 3 [facility])  Assessment & Plan  Lab Results   Component Value Date    EGFR 43 04/15/2021    EGFR 56 04/14/2021    EGFR 57 07/31/2020    CREATININE 1 58 (H) 04/15/2021    CREATININE 1 26 04/14/2021    CREATININE 1 26 07/31/2020   · creat baseline appears to be 1 2-1 5 range   · Repeat bmp now given concern for hemolyzed sample   · Avoid nephrotoxic drugs and hypotension     Dyslipidemia  Assessment & Plan  Currently on Pravachol 80 mg daily  Obesity (BMI 30-39  9)  Assessment & Plan  Patient would need nutritional support and advise regarding weight loss as out patient  BMI 37      VTE Pharmacologic Prophylaxis:   Pharmacologic: Enoxaparin (Lovenox)  Mechanical VTE Prophylaxis in Place: No    Patient Centered Rounds: I have performed bedside rounds with nursing staff today  Discussions with Specialists or Other Care Team Provider: chloé/valdo RN and Kuldeep Onofre with pulm     Education and Discussions with Family / Patient: d/w patient  Declined call to family     Time Spent for Care: 30 minutes  More than 50% of total time spent on counseling and coordination of care as described above      Current Length of Stay: 1 day(s)    Current Patient Status: Inpatient   Certification Statement: The patient will continue to require additional inpatient hospital stay due to iv steroids     Discharge Plan: not stable for discharge  C/w IV steroids  Code Status: Level 1 - Full Code      Subjective:   Pt reports he is feeling better now vs  Admission  Denies all other complaints     Objective:     Vitals:   Temp (24hrs), Av 3 °F (36 8 °C), Min:98 3 °F (36 8 °C), Max:98 3 °F (36 8 °C)    Temp:  [98 3 °F (36 8 °C)] 98 3 °F (36 8 °C)  HR:  [] 84  Resp:  [18-24] 20  BP: (132-174)/(55-76) 170/74  SpO2:  [91 %-98 %] 95 %  Body mass index is 37 84 kg/m²  Input and Output Summary (last 24 hours): Intake/Output Summary (Last 24 hours) at 4/15/2021 1030  Last data filed at 4/15/2021 0230  Gross per 24 hour   Intake 500 ml   Output --   Net 500 ml       Physical Exam:     Physical Exam  Constitutional:       General: He is not in acute distress  Appearance: He is obese  Cardiovascular:      Rate and Rhythm: Normal rate and regular rhythm  Pulses: Normal pulses  Heart sounds: Normal heart sounds  No murmur  Pulmonary:      Effort: No respiratory distress  Breath sounds: Normal breath sounds  No wheezing or rales  Comments: RLL diminished   Abdominal:      General: Bowel sounds are normal  There is no distension  Palpations: Abdomen is soft  Tenderness: There is no abdominal tenderness  Musculoskeletal:         General: No swelling or tenderness  Skin:     General: Skin is warm and dry  Neurological:      General: No focal deficit present  Mental Status: He is alert  Mental status is at baseline     Psychiatric:         Attention and Perception: Attention normal          Mood and Affect: Mood normal            Additional Data:     Labs:    Results from last 7 days   Lab Units 04/15/21  0441 21  1541   WBC Thousand/uL 7 41 8 44   HEMOGLOBIN g/dL 10 6* 11 3*   HEMATOCRIT % 34 7* 37 7   PLATELETS Thousands/uL 166 147*   NEUTROS PCT %  --  83*   LYMPHS PCT %  --  9*   LYMPHO PCT % 2*  --    MONOS PCT %  --  6   MONO PCT % 1*  --    EOS PCT % 2 1     Results from last 7 days   Lab Units 04/15/21  0442   SODIUM mmol/L 132*   POTASSIUM mmol/L 5 4*   CHLORIDE mmol/L 94*   CO2 mmol/L 32   BUN mg/dL 24   CREATININE mg/dL 1 58*   ANION GAP mmol/L 6   CALCIUM mg/dL 8 8   ALBUMIN g/dL 2 9*   TOTAL BILIRUBIN mg/dL 0 34   ALK PHOS U/L 80   ALT U/L 15   AST U/L 23   GLUCOSE RANDOM mg/dL 448*     Results from last 7 days   Lab Units 04/15/21  0442   INR  1 01     Results from last 7 days   Lab Units 04/15/21  0735 04/15/21  0212 04/15/21  0042   POC GLUCOSE mg/dl 319* 491* >500*     Results from last 7 days   Lab Units 04/14/21  2139   HEMOGLOBIN A1C % 9 2*     Results from last 7 days   Lab Units 04/15/21  0442   PROCALCITONIN ng/ml 0 08           * I Have Reviewed All Lab Data Listed Above  * Additional Pertinent Lab Tests Reviewed:  All Labs Within Last 24 Hours Reviewed    Imaging:    Imaging Reports Reviewed Today Include: CTA chest, chest xray     Recent Cultures (last 7 days):     Results from last 7 days   Lab Units 04/15/21  0453   GRAM STAIN RESULT  1+ Epithelial cells per low power field*  No Polys*  2+ Gram positive cocci in pairs*  1+ Gram negative rods*       Last 24 Hours Medication List:   Current Facility-Administered Medications   Medication Dose Route Frequency Provider Last Rate    acetaminophen  650 mg Oral Q6H PRN Danny Trammell MD      aluminum-magnesium hydroxide-simethicone  30 mL Oral Q6H PRN Danny Trammell MD      aspirin  81 mg Oral Daily Danny Trammell MD      budesonide  0 5 mg Nebulization Q12H Kaila Negron MD      clotrimazole   Topical BID Danny Trammell MD      cyclobenzaprine  10 mg Oral TID Danny Trammell MD      docusate sodium  100 mg Oral BID PRN Danny Trammell MD      enoxaparin  40 mg Subcutaneous Daily Danny Trammell MD      ferrous sulfate  325 mg Oral Daily With Breakfast Gurdeep Sanchez MD      fish oil  1,000 mg Oral BID Gurdeep Sanchez MD      furosemide  40 mg Oral BID Gurdeep Sanchez MD      guaiFENesin  600 mg Oral BID Gurdeep Sanchez MD      insulin lispro  2-12 Units Subcutaneous Q6H [facility] Gurdeep Sanchez MD      insulin regular  35 Units Subcutaneous HS Gurdeep Sanchez MD      insulin regular  45 Units Subcutaneous BID AC Gurdeep Sanchez MD      ipratropium  0 5 mg Nebulization TID Gurdeep Sanchez MD      levalbuterol  1 25 mg Nebulization Q6H PRN Gurdeep Sanchez MD      And    sodium chloride  3 mL Nebulization Q6H PRN Gurdeep Sanchez MD      levalbuterol  1 25 mg Nebulization TID Gurdeep Sanchez MD      methylPREDNISolone sodium succinate  40 mg Intravenous Q8H Gurdeep Sanchez MD      metoprolol tartrate  25 mg Oral BID Gurdeep Sanchez MD      ondansetron  4 mg Intravenous Q6H PRN Gurdeep Sanchez MD      oxyCODONE-acetaminophen  1 5 tablet Oral Q6H PRN Gurdeep Sanchez MD      pantoprazole  40 mg Oral Daily Before Breakfast Gurdeep Sanchez MD      pravastatin  80 mg Oral Daily With Pablito Miller MD      tamsulosin  0 4 mg Oral Daily With Pablito Miller MD          Today, Patient Was Seen By: YOLANDE Slaughter    ** Please Note: Dictation voice to text software may have been used in the creation of this document   **

## 2021-04-15 NOTE — ASSESSMENT & PLAN NOTE
Lab Results   Component Value Date    HGBA1C 9 2 (H) 04/14/2021       Recent Labs     04/15/21  0042 04/15/21  0212 04/15/21  0735   POCGLU >500* 491* 319*       Blood Sugar Average: Last 72 hrs:  (P) 405     · Uncontrolled hyperglycemia  · continue Humulin U 45 units twice daily with sliding scale and Accu-Cheks per protocol    · Monitor glucose closely with steroids

## 2021-04-15 NOTE — ASSESSMENT & PLAN NOTE
According to patient this is a recurrent finding and was drained approximately 2 months ago approximately 1 5 gal was taken  He does have a history of right adeno carcinoma and sees Radiation Oncology (Ilir) as well as Pulmonary (Pranav)  NPO post midnight  Interventional radiology consult for removal of pleural effusion  Patient is on Breo 100/25 daily  Start guaifenesin 600 mg twice daily  Continue Atrovent Xopenex  Given Solu-Medrol 125 mg; will continue with 40 mg daily

## 2021-04-15 NOTE — CASE MANAGEMENT
LOS: 1  Not a bundle  Readmission risk: Alvaro  CM spoke with pt to complete CM assessment and discuss role of CM  Pt lives with his son in a 2-story home with 1 step at entrance  Pt is independent in ADLs  Pt has DME including: cane, RW, electric w/c, s/c, commode, 3L O2 ([employer]) and nebulizer  Pt has hx [facility]  Preference for pharmacy is [facility] in [place], [place]  No MH/D&A tx hx  PCP- Staci Garvey MD ([phone])  No POA  Main contact: Alice Anaya ([phone])  Pt's son will transport upon d/c     CM reviewed d/c planning process including the following: identifying help at home, patient preference for d/c planning needs, Discharge Lounge, [facility] Meds to Bed program, availability of treatment team to discuss questions or concerns patient and/or family may have regarding understanding medications and recognizing signs and symptoms once discharged  CM also encouraged patient to follow up with all recommended appointments after discharge  Patient advised of importance for patient and family to participate in managing patients medical well being

## 2021-04-15 NOTE — CONSULTS
Consult Note - Pulmonary   Shira Manuel  70 y o  male MRN: [id number]  Unit/Bed#: ED 11 Encounter: [id number]      Reason for consultation: Chronic  Hypoxic respiratory failure, severe COPD, pleural effusion, lung cancer    Requesting physician: Dr Kris Gomez    Assessment:    1   chronic hypoxic respiratory failure secondary to severe COPD on 3 L nasal cannula and Trelegy 1 puff daily   2   severe COPD with mild acute exacerbation  3  right recurrent pleural effusion- 2 previous thoracentesis in July and November 2020  Initial thoracentesis in July transudative with negative cytology  Unfortunately repeat thoracentesis in November was not sent for full fluid analysis, but cytology was also negative for carcinoma  4  right apical pulmonary  Adenocarcinoma currently on radiation, planning for repeat PET-CT as outpatient  5  Severe pulm HTN- Group III due to severe COPD  6   chronic diastolic CHF  7  Suspect JACQUELINE/OHS  8  Hx of nicotine dependence     Plan:     ·  currently on home 3 L nasal cannula, continue to monitor SpO2, at of bed to chair, incentive spirometry, goal SpO2 greater than 88%  · Continue atrovent/xopenex and pulmicort, home regime Trelegy 1 puff daily, prn nebs/mdi  ·  continue Solu-Medrol 40 mg q 8 hours for now, hopeful to taper tomorrow  ·  will complete bedside ultrasound to determine if bedside thoracentesis can be completed -  Will need to send repeat fluid analysis with cytology  · Continue Lasix 40mg BID  ·  will need outpatient follow-up with Radiation Oncology regarding lung adenocarcinoma, f/u PET CT   ·  needs aggressive glucose control given steroid use and poorly controlled diabetes  · Will follow       Case to be discussed with Dr Chinyere Mcfarland      History of Present Illness      HPI:  Miryam Mars Sr  is a 70 y o  male  With history of insulin-dependent diabetes, hypertension / hyperlipidemia, previous MI with stents,   Chronic hypoxic respiratory failure on 3 L nasal cannula continuously, severe COPD with FEV1 33% predicted, right lung adenocarcinoma  With ongoing radiation not a surgical candidate, severe pulmonary hypertension, suspected JACQUELINE, who presents with worsening shortness of breath  Patient has a history of recurrent right pleural effusion and has had 2 previous thoracentesis in July and November 2020  Initial thoracentesis in July transudative with negative cytology  Unfortunately repeat thoracentesis in November was not sent for full fluid analysis, but cytology was also negative for carcinoma  Patient underwent IR biopsy of right lung nodule in November 2020 and found to have adenocarcinoma  Was seen by thoracic surgery and deemed not a surgical candidate, currently undergoing radiation and is planned for repeat PET-CT as outpatient, but has  Been unable to have this done due to ongoing glucose issues  Over the past few days, patient has been having worsening shortness of breath, wheezing, chest tightness and coughing up clear phlegm  Denies fever, chills, nausea, vomiting, myalgias  Due to the symptoms, he presented to the ER for further evaluation  in the ER, afebrile, tachycardic/tachypneic, saturating mid 90s on home 3 L nasal cannula  Labs pertinent for WBC 7 4, hemoglobin 10 6, platelets 638, procalcitonin 0 08, creatinine 1 58  CTA chest was negative for PE, did show a recurrent moderate right pleural effusion with atelectasis and volume loss  Patient received nebulizers and corticosteroids and pulmonary consult was requested  Review of Systems   Constitutional: Negative for chills, fever and unexpected weight change  HENT: Negative for congestion, rhinorrhea, sneezing and sore throat  Respiratory: Positive for cough, chest tightness, shortness of breath and wheezing  Cardiovascular: Negative for chest pain, palpitations and leg swelling  Gastrointestinal: Negative for abdominal pain, constipation, diarrhea, nausea and vomiting  Genitourinary: Negative for dysuria  Musculoskeletal: Negative for arthralgias  Neurological: Negative for dizziness and numbness  Historical Information   Past Medical History:   Diagnosis Date    Abdominal pain     Cardiac disease     CHF (congestive heart failure) (HCC)     COPD, severe (HCC)     Coronary artery disease     DDD (degenerative disc disease), lumbar 9/1/2020    [facility]     Dyspnea     GERD (gastroesophageal reflux disease)     Hyperlipidemia     Hypertension     MI ([facility]ocardial infarction) ([facility] )     with 3 stents    Nodule of apex of right lung     JACQUELINE (obstructive sleep apnea)     Prostate cancer [facility])      Past Surgical History:   Procedure Laterality Date    ABDOMINAL SURGERY      exploratory    ANGIOPLASTY      3 stents    APPENDECTOMY      COLONOSCOPY  2015    CT NEEDLE BIOPSY LUNG  11/3/2020    ESOPHAGOGASTRODUODENOSCOPY N/A 10/2/2017    Procedure: ESOPHAGOGASTRODUODENOSCOPY (EGD); Surgeon: Ricardo Adams MD;  [facility]: [facility]; Service: Gastroenterology    IR THORACENTESIS  11/3/2020    KNEE CARTILAGE SURGERY      OTHER SURGICAL HISTORY      stent placement    PROSTATE SURGERY      SKIN GRAFT      Basal cell CA back     Family History   Problem Relation Age of Onset    Heart disease Father     Other Father         Mesothelioma        Social History:    Former smoker 1 5 packs per day for 50 years, quit in July 2020, no alcohol or drug use, lives at home with son    Meds/Allergies   Current Facility-Administered Medications   Medication Dose Route Frequency    acetaminophen (TYLENOL) tablet 650 mg  650 mg Oral Q6H PRN    aluminum-magnesium hydroxide-simethicone (MYLANTA) oral suspension 30 mL  30 mL Oral Q6H PRN    aspirin (ECOTRIN LOW STRENGTH) EC tablet 81 mg  81 mg Oral Daily    clotrimazole (LOTRIMIN) 1 % cream   Topical BID    cyclobenzaprine (FLEXERIL) tablet 10 mg  10 mg Oral TID    docusate sodium (COLACE) capsule 100 mg 100 mg Oral BID PRN    enoxaparin (LOVENOX) subcutaneous injection 40 mg  40 mg Subcutaneous Daily    ferrous sulfate tablet 325 mg  325 mg Oral Daily With Breakfast    fish oil capsule 1,000 mg  1,000 mg Oral BID    fluticasone-vilanterol (BREO ELLIPTA) 100-25 mcg/inh inhaler 1 puff  1 puff Inhalation Daily    furosemide (LASIX) tablet 40 mg  40 mg Oral BID    guaiFENesin (MUCINEX) 12 hr tablet 600 mg  600 mg Oral BID    insulin lispro (HumaLOG) 100 units/mL subcutaneous injection 2-12 Units  2-12 Units Subcutaneous Q6H [facility]    insulin regular (HumuLIN R U-500) 500 units/mL CONCENTRATED injection 35 Units  35 Units Subcutaneous HS    insulin regular (HumuLIN R U-500) 500 units/mL CONCENTRATED injection 45 Units  45 Units Subcutaneous BID AC    ipratropium (ATROVENT) 0 02 % inhalation solution 0 5 mg  0 5 mg Nebulization TID    levalbuterol (XOPENEX) inhalation solution 1 25 mg  1 25 mg Nebulization Q6H PRN    And    sodium chloride 0 9 % inhalation solution 3 mL  3 mL Nebulization Q6H PRN    levalbuterol (XOPENEX) inhalation solution 1 25 mg  1 25 mg Nebulization TID    methylPREDNISolone sodium succinate (Solu-MEDROL) injection 40 mg  40 mg Intravenous Q8H    metoprolol tartrate (LOPRESSOR) tablet 25 mg  25 mg Oral BID    ondansetron (ZOFRAN) injection 4 mg  4 mg Intravenous Q6H PRN    oxyCODONE-acetaminophen (PERCOCET) 5-325 mg per tablet 1 5 tablet  1 5 tablet Oral Q6H PRN    pantoprazole (PROTONIX) EC tablet 40 mg  40 mg Oral Daily Before Breakfast    potassium chloride (K-DUR,KLOR-CON) CR tablet 20 mEq  20 mEq Oral Daily    pravastatin (PRAVACHOL) tablet 80 mg  80 mg Oral Daily With Dinner    tamsulosin (FLOMAX) capsule 0 4 mg  0 4 mg Oral Daily With Dinner    traMADol (ULTRAM) tablet 50 mg  50 mg Oral Q6H PRN     (Not in a hospital admission)    Allergies   Allergen Reactions    Crestor [Rosuvastatin]     Metformin GI Intolerance       Vitals:    04/15/21 0545 04/15/21 0819 04/15/21 0825 04/15/21 0832   BP: 137/64 170/74 170/74    BP Location: Left arm  Left arm    Pulse: 96 82 84    Resp: 20  20    Temp:       TempSrc:       SpO2: 96%  95% 95%   Weight:           Physical Exam  Constitutional:       General: He is not in acute distress  Appearance: He is well-developed  He is not diaphoretic  Comments: Obese   HENT:      Head: Normocephalic and atraumatic  Mouth/Throat:      Mouth: Mucous membranes are moist       Pharynx: Oropharynx is clear  No oropharyngeal exudate  Eyes:      General: No scleral icterus  Cardiovascular:      Rate and Rhythm: Normal rate and regular rhythm  Heart sounds: Normal heart sounds  No murmur  Pulmonary:      Effort: Pulmonary effort is normal  No respiratory distress  Breath sounds: No wheezing  Comments: Diminished breath sounds with diffuse expiratory wheezing and right basilar rales  Abdominal:      General: Bowel sounds are normal  There is no distension  Palpations: Abdomen is soft  Tenderness: There is no abdominal tenderness  Musculoskeletal:      Right lower leg: No edema  Left lower leg: No edema  Skin:     Comments: Venous stasis changes lower extremities   Neurological:      Mental Status: He is alert and oriented to person, place, and time  Labs: I have personally reviewed pertinent lab results    Results from last 7 days   Lab Units 04/15/21  0441 04/14/21  1541   WBC Thousand/uL 7 41 8 44   HEMOGLOBIN g/dL 10 6* 11 3*   HEMATOCRIT % 34 7* 37 7   PLATELETS Thousands/uL 166 147*   NEUTROS PCT %  --  83*   MONOS PCT %  --  6   MONO PCT % 1*  --       Results from last 7 days   Lab Units 04/15/21  0442 04/14/21  1541   POTASSIUM mmol/L 5 4* 4 9   CHLORIDE mmol/L 94* 97*   CO2 mmol/L 32 35*   BUN mg/dL 24 18   CREATININE mg/dL 1 58* 1 26   CALCIUM mg/dL 8 8 8 8   ALK PHOS U/L 80 89   ALT U/L 15 16   AST U/L 23 21     Results from last 7 days   Lab Units 04/15/21  0442   MAGNESIUM mg/dL 2 1 Results from last 7 days   Lab Units 04/15/21  0442   PHOSPHORUS mg/dL 3 2      Results from last 7 days   Lab Units 04/15/21  0442   INR  1 01   PTT seconds 28         0   Lab Value Date/Time    TROPONINI <0 02 04/14/2021 1541    TROPONINI <0 02 07/27/2020 2043    TROPONINI <0 02 06/14/2019 2119    TROPONINI <0 02 06/14/2019 1804    TROPONINI <0 02 06/14/2019 1325    TROPONINI <0 02 06/19/2018 0750    TROPONINI <0 02 06/19/2018 0445    TROPONINI 0 02 05/24/2018 0500    TROPONINI 0 03 05/24/2018 0045    TROPONINI 0 06 (H) 05/23/2018 1252    TROPONINI <0 02 04/27/2018 1522    TROPONINI 0 20 (H) 02/02/2018 0239    TROPONINI 0 25 (H) 02/01/2018 2332    TROPONINI 0 27 (H) 02/01/2018 2043    TROPONINI 0 12 (H) 02/01/2018 1400    TROPONINI <0 02 11/01/2017 0440    TROPONINI <0 02 11/01/2017 0108    TROPONINI 0 10 (H) 09/28/2017 2005    TROPONINI 0 10 (H) 09/28/2017 1730    TROPONINI <0 02 11/13/2016 0014    TROPONINI <0 02 11/12/2016 2018    TROPONINI <0 02 01/21/2016 0543    TROPONINI <0 02 01/21/2016 0129    TROPONINI <0 02 01/20/2016 1217    TROPONINI <0 02 01/20/2016 0347    TROPONINI <0 02 01/20/2016 0141    TROPONINI <0 02 09/09/2015 0530    TROPONINI <0 02 09/09/2015 0041    TROPONINI 0 04 03/23/2015 1443    TROPONINI <0 04 03/23/2015 0629    TROPONINI <0 04 03/20/2014 0614    TROPONINI <0 04 03/20/2014 0149       Imaging and other studies: I have personally reviewed pertinent reports     and I have personally reviewed pertinent films in PACS   CTA chest 04/14/2021   negative for PE, moderate right pleural effusion with compressive atelectasis and volume loss    Pulmonary function testing:   10/14/20  Results:  FEV1/FVC Ratio: 41 %  Forced Vital Capacity: 2 72 L    61 % predicted  FEV1: 1 11 L     33 % predicted     Lung volumes by body plethysmography:   Total Lung Capacity 89 % predicted   Residual volume 144 % predicted     DLCO corrected for patients hemoglobin level: 36 %     Interpretation:     · Severe obstructive airflow defect     · Normal TLC  with increased residual volume indicative of air trapping     · Severe diffusion defect after correction for HGB     · Increased airway resistance, as indicated by specific airway conductance     · Severe obstructive pattern on flow volume loops  ·     EKG, Pathology, and Other Studies: I have personally reviewed pertinent reports      TTE 7/30/20  EF 44%, Grade 1 diastolic  Dysfunction, dilated RV with reduced function    Code Status: Level 1 - Full Code      Rebecca Mclean MD  Pulmonary & Critical Care Fellow, [facility]

## 2021-04-15 NOTE — ASSESSMENT & PLAN NOTE
Wt Readings from Last 3 Encounters:   04/14/21 127 kg (279 lb)   03/12/21 127 kg (279 lb)   11/20/20 122 kg (270 lb)     · Current weight is stable at 127 kilos  Most likely current difficulty with breathing is secondary to asthma exacerbation plan as the accumulation of moderate right pleural effusion in conjunction with adeno carcinoma    · C/w lasix 40mg BID   · Follows outpatient with Dr Cass Grover

## 2021-04-15 NOTE — ASSESSMENT & PLAN NOTE
Lab Results   Component Value Date    HGBA1C 9 9 (A) 03/12/2021       No results for input(s): POCGLU in the last 72 hours  Blood Sugar Average: Last 72 hrs:   continue Humulin U 545 units twice daily with sliding scale and Accu-Cheks per protocol

## 2021-04-15 NOTE — ASSESSMENT & PLAN NOTE
· Noted on am labs   · Sample was commented to be hemolyzed   · Repeat bmp now and hold K supplements for now

## 2021-04-15 NOTE — RESPIRATORY THERAPY NOTE
RT Protocol Note  Radha Miramontes  70 y o  male MRN: [id number]  Unit/Bed#: ED 11 Encounter: [id number]    Assessment    Active Problems:    Obesity (BMI 30-39  9)    Dyslipidemia    Type 2 diabetes mellitus with hyperglycemia, with long-term current use of insulin (HCC)    COPD, severe (HCC)    Pleural effusion on right    Chronic diastolic heart failure (HCC)    Essential hypertension    DDD (degenerative disc disease), lumbar    Primary adenocarcinoma of upper lobe of right lung (HCC)      Home Pulmonary Medications:  Albuterol inhalation solution vials TID  Trelegy Ellipta Daily  Symbicort PRN    Home Devices/Therapy: Home O2    Past Medical History:   Diagnosis Date    Abdominal pain     Cardiac disease     [facility]     COPD, severe (HCC)     Coronary artery disease     DDD (degenerative disc disease), lumbar 2020    Diabetes mellitus ([facility] )     Dyspnea     GERD (gastroesophageal reflux disease)     Hyperlipidemia     Hypertension     [facility]     with 3 stents    Nodule of apex of right lung     JACQUELINE (obstructive sleep apnea)     Prostate cancer ([facility] )      Social History     Socioeconomic History    Marital status: /Civil Union     Spouse name: None    Number of children: 1    Years of education: 8    Highest education level: None   Occupational History    None   Social Needs    Financial resource strain: None    Food insecurity     Worry: None     Inability: None    Transportation needs     Medical: None     Non-medical: None   Tobacco Use    Smoking status: Former Smoker     Packs/day: 1 50     Years: 50 00     Pack years: 75 00     Quit date: 2020     Years since quittin 7    Smokeless tobacco: Never Used   Substance and Sexual Activity    Alcohol use: Not Currently     Frequency: Never     Binge frequency: Never    Drug use: No    Sexual activity: Never     Partners: Female   Lifestyle    Physical activity Days per week: None     Minutes per session: None    Stress: None   Relationships    Social connections     Talks on phone: None     Gets together: None     Attends Sikh service: None     Active member of club or organization: None     Attends meetings of clubs or organizations: None     Relationship status: None    Intimate partner violence     Fear of current or ex partner: None     Emotionally abused: None     Physically abused: None     Forced sexual activity: None   Other Topics Concern    None   Social History Narrative    Most recent tobacco use screenin2018    Do you currently or have you served in the [employer]: No    Were you activated, into active duty, as a member of the [employer] or as a Reservist: No    Caffeine intake: Moderate    Alcohol intake: None    Marital status:     Number of children: 1    Education: 8    Occupation: retired    Sexual orientation: Heterosexual    General stress level: Medium    Live alone or with others: with others    Exercise level: None    Sexually active: No    Overweight: Yes    Obese: Yes    Guns present in home: No    Seat belts used routinely: Yes    Advance directive: No    Sunscreen used routinely: No    Smoke alarm in home: Yes    Legally blind in one or both eyes: No    Hard of hearing or deaf in one or both ears: No       Subjective         Objective    Physical Exam:   Assessment Type: Assess only  General Appearance: Awake, Alert  Respiratory Pattern: Dyspnea at rest, Spontaneous  Chest Assessment: Chest expansion symmetrical, Trachea midline  Bilateral Breath Sounds: Diminished, Coarse  R Breath Sounds: Diminished  L Breath Sounds: Coarse  Cough: Dry, Strong, Non-productive  O2 Device: 3 lpm NC    Vitals:  Blood pressure 141/55, pulse (!) 106, temperature 98 3 °F (36 8 °C), temperature source Tympanic, resp  rate 22, weight 127 kg (279 lb), SpO2 91 %            Imaging and other studies: I have personally reviewed pertinent reports  O2 Device: 3 lpm NC     Plan    Respiratory Plan: Moderate/Severe Distress pathway, Home Bronchodilator Patient pathway        Resp Comments: Pt assessed; observed sitting and eating a meal while on NC  Pt states he uses Albuterol UDN 3-4 times at home and Trerehana MDI  He also has 24/7 oxygen @ 3 lpm   He is able to answer questions but is dyspneic at rest   He states this is his baseline  Pt  had a CBT admin with Albuterol and Atrovent prior to assessment, tolerated fairly well  He states he feels "about the same" regarding his breathing  BBS decreased with a slight coarseness on right  CT of chest noted for a moderate right pleural effusion with some atelectasis  Dry NPC at times  Tx plan will keep pt's home UDN regimen and add flutter valve 2/2 dry cough with atelectasis  Monitoring will continue as per protocol

## 2021-04-15 NOTE — PROCEDURES
Thoracentesis    Date/Time: 4/15/2021 10:21 AM  Performed by: Rebecca Mclean MD  Authorized by: Rebecca Mclean MD     Patient location:  Bedside  Consent:     Consent obtained:  Verbal and written    Consent given by:  Patient    Risks discussed:  Bleeding, infection, pain, pneumothorax, nerve damage and incomplete drainage    Alternatives discussed:  No treatment and delayed treatment  Universal protocol:     Procedure explained and questions answered to patient or proxy's satisfaction: yes      Relevant documents present and verified: yes      Test results available and properly labeled: yes      Radiology Images displayed and confirmed  If images not available, report reviewed: yes      Required blood products, implants, devices and special equipment available: yes      Site/side marked: yes      Immediately prior to procedure a time out was called: yes      Patient identity confirmed:  Arm band and verbally with patient  Indications:     Procedure Purpose: diagnostic and therapeutic      Indications: pleural effusion    Anesthesia (see MAR for exact dosages): Anesthesia method:  Local infiltration    Local anesthetic:  Lidocaine 1% w/o epi  Procedure details:     Preparation: Patient was prepped and draped in usual sterile fashion      Standard thoracentesis cath kit used: Yes      Patient position:  Sitting    Laterality:  Right    Location:  Midscapular line    Intercostal space:  7th    Puncture method:  Over-the-needle catheter    Ultrasound guidance: yes      Reason for ultrasound: Identify fluid collection and guide catheter placement        Ultrasound image availability:  Not saved    Sterile ultrasound techniques: Sterile gel and sterile probe covers were used      Indwelling catheter placed: no      Number of attempts:  1    Needle gauge:  22    Catheter size:  8 Fr    Drainage color:  Sangeetha    Drainage characteristics:  Serosanguinous    Fluid removed amount:  1 7 L  Post-procedure details: Post-procedure chest x-ray: pending       Patient tolerance of procedure:   Tolerated well, no immediate complications

## 2021-04-15 NOTE — ASSESSMENT & PLAN NOTE
· CTA chest 4/14- Moderate right pleural effusion  · According to patient this is a recurrent finding and was drained approximately 2 months ago approximately 1 5 gal was taken  He does have a history of right adeno carcinoma and sees Radiation Oncology (Ilir) as well as Pulmonary (Pranav)     · pulm consult placed- plan to do thoracentesis at bedside if able

## 2021-04-15 NOTE — ASSESSMENT & PLAN NOTE
· CTA chest 4/14- Moderate right pleural effusion  Mild compressive atelectasis in the right lung   Few airspace opacities in the upper lung could represent mild aspiration or pneumonia  · procal x1 negative   · Pulmonary consult  · Continue nebs  · Respiratory protocol    · Continue intravenous steroid treatment:  Solu-Medrol 40 mg Q 8   · Mucinex 600 mg q 12     · C/w home o2 at [facility]

## 2021-04-15 NOTE — ASSESSMENT & PLAN NOTE
Wt Readings from Last 3 Encounters:   04/14/21 127 kg (279 lb)   03/12/21 127 kg (279 lb)   11/20/20 122 kg (270 lb)     Current weight is stable at 127 kilos  Most likely current difficulty with breathing is secondary to asthma exacerbation plan as the accumulation of moderate right pleural effusion in conjunction with adeno carcinoma

## 2021-04-16 LAB
ANION GAP SERPL CALCULATED.3IONS-SCNC: 1 MMOL/L (ref 4–13)
BUN SERPL-MCNC: 34 MG/DL (ref 5–25)
CALCIUM SERPL-MCNC: 8.7 MG/DL (ref 8.3–10.1)
CHLORIDE SERPL-SCNC: 96 MMOL/L (ref 100–108)
CO2 SERPL-SCNC: 37 MMOL/L (ref 21–32)
CREAT SERPL-MCNC: 1.29 MG/DL (ref 0.6–1.3)
ERYTHROCYTE [DISTWIDTH] IN BLOOD BY AUTOMATED COUNT: 16.6 % (ref 11.6–15.1)
GFR SERPL CREATININE-BSD FRML MDRD: 55 ML/MIN/1.73SQ M
GLUCOSE SERPL-MCNC: 185 MG/DL (ref 65–140)
GLUCOSE SERPL-MCNC: 211 MG/DL (ref 65–140)
GLUCOSE SERPL-MCNC: 225 MG/DL (ref 65–140)
GLUCOSE SERPL-MCNC: 290 MG/DL (ref 65–140)
GLUCOSE SERPL-MCNC: 302 MG/DL (ref 65–140)
GLUCOSE SERPL-MCNC: 332 MG/DL (ref 65–140)
HCT VFR BLD AUTO: 35.6 % (ref 36.5–49.3)
HGB BLD-MCNC: 10.7 G/DL (ref 12–17)
MCH RBC QN AUTO: 24.4 PG (ref 26.8–34.3)
MCHC RBC AUTO-ENTMCNC: 30.1 G/DL (ref 31.4–37.4)
MCV RBC AUTO: 81 FL (ref 82–98)
PLATELET # BLD AUTO: 155 THOUSANDS/UL (ref 149–390)
PMV BLD AUTO: 10.5 FL (ref 8.9–12.7)
POTASSIUM SERPL-SCNC: 4.8 MMOL/L (ref 3.5–5.3)
PROCALCITONIN SERPL-MCNC: 0.05 NG/ML
RBC # BLD AUTO: 4.38 MILLION/UL (ref 3.88–5.62)
SODIUM SERPL-SCNC: 134 MMOL/L (ref 136–145)
WBC # BLD AUTO: 9.57 THOUSAND/UL (ref 4.31–10.16)

## 2021-04-16 PROCEDURE — 94640 AIRWAY INHALATION TREATMENT: CPT

## 2021-04-16 PROCEDURE — 85027 COMPLETE CBC AUTOMATED: CPT | Performed by: NURSE PRACTITIONER

## 2021-04-16 PROCEDURE — 99232 SBSQ HOSP IP/OBS MODERATE 35: CPT | Performed by: INTERNAL MEDICINE

## 2021-04-16 PROCEDURE — 99223 1ST HOSP IP/OBS HIGH 75: CPT | Performed by: INTERNAL MEDICINE

## 2021-04-16 PROCEDURE — 80048 BASIC METABOLIC PNL TOTAL CA: CPT | Performed by: NURSE PRACTITIONER

## 2021-04-16 PROCEDURE — 94760 N-INVAS EAR/PLS OXIMETRY 1: CPT

## 2021-04-16 PROCEDURE — 99232 SBSQ HOSP IP/OBS MODERATE 35: CPT | Performed by: PHYSICIAN ASSISTANT

## 2021-04-16 PROCEDURE — 84145 PROCALCITONIN (PCT): CPT | Performed by: NURSE PRACTITIONER

## 2021-04-16 PROCEDURE — 82948 REAGENT STRIP/BLOOD GLUCOSE: CPT

## 2021-04-16 RX ORDER — PREDNISONE 20 MG/1
40 TABLET ORAL DAILY
Status: COMPLETED | OUTPATIENT
Start: 2021-04-17 | End: 2021-04-19

## 2021-04-16 RX ORDER — METHYLPREDNISOLONE SODIUM SUCCINATE 40 MG/ML
40 INJECTION, POWDER, LYOPHILIZED, FOR SOLUTION INTRAMUSCULAR; INTRAVENOUS EVERY 12 HOURS SCHEDULED
Status: DISCONTINUED | OUTPATIENT
Start: 2021-04-16 | End: 2021-04-16

## 2021-04-16 RX ORDER — PREDNISONE 20 MG/1
20 TABLET ORAL DAILY
Status: DISCONTINUED | OUTPATIENT
Start: 2021-04-23 | End: 2021-04-19 | Stop reason: HOSPADM

## 2021-04-16 RX ORDER — PREDNISONE 10 MG/1
10 TABLET ORAL DAILY
Status: DISCONTINUED | OUTPATIENT
Start: 2021-04-26 | End: 2021-04-19 | Stop reason: HOSPADM

## 2021-04-16 RX ORDER — METHYLPREDNISOLONE SODIUM SUCCINATE 40 MG/ML
40 INJECTION, POWDER, LYOPHILIZED, FOR SOLUTION INTRAMUSCULAR; INTRAVENOUS EVERY 12 HOURS SCHEDULED
Status: COMPLETED | OUTPATIENT
Start: 2021-04-16 | End: 2021-04-16

## 2021-04-16 RX ADMIN — FERROUS SULFATE TAB 325 MG (65 MG ELEMENTAL FE) 325 MG: 325 (65 FE) TAB at 06:46

## 2021-04-16 RX ADMIN — GUAIFENESIN 600 MG: 600 TABLET, EXTENDED RELEASE ORAL at 08:32

## 2021-04-16 RX ADMIN — METOPROLOL TARTRATE 25 MG: 25 TABLET, FILM COATED ORAL at 08:32

## 2021-04-16 RX ADMIN — ASPIRIN 81 MG: 81 TABLET, COATED ORAL at 08:32

## 2021-04-16 RX ADMIN — INSULIN HUMAN 45 UNITS: 500 INJECTION, SOLUTION SUBCUTANEOUS at 06:48

## 2021-04-16 RX ADMIN — LEVALBUTEROL HYDROCHLORIDE 1.25 MG: 1.25 SOLUTION, CONCENTRATE RESPIRATORY (INHALATION) at 19:38

## 2021-04-16 RX ADMIN — METOPROLOL TARTRATE 25 MG: 25 TABLET, FILM COATED ORAL at 17:13

## 2021-04-16 RX ADMIN — CLOTRIMAZOLE: 0.01 CREAM TOPICAL at 17:13

## 2021-04-16 RX ADMIN — METHYLPREDNISOLONE SODIUM SUCCINATE 40 MG: 40 INJECTION, POWDER, FOR SOLUTION INTRAMUSCULAR; INTRAVENOUS at 06:47

## 2021-04-16 RX ADMIN — IPRATROPIUM BROMIDE 0.5 MG: 0.5 SOLUTION RESPIRATORY (INHALATION) at 07:07

## 2021-04-16 RX ADMIN — PRAVASTATIN SODIUM 80 MG: 80 TABLET ORAL at 17:12

## 2021-04-16 RX ADMIN — LEVALBUTEROL HYDROCHLORIDE 1.25 MG: 1.25 SOLUTION, CONCENTRATE RESPIRATORY (INHALATION) at 13:34

## 2021-04-16 RX ADMIN — CLOTRIMAZOLE: 0.01 CREAM TOPICAL at 08:31

## 2021-04-16 RX ADMIN — CYCLOBENZAPRINE HYDROCHLORIDE 10 MG: 10 TABLET, FILM COATED ORAL at 17:12

## 2021-04-16 RX ADMIN — OMEGA-3 FATTY ACIDS CAP 1000 MG 1000 MG: 1000 CAP at 11:08

## 2021-04-16 RX ADMIN — ENOXAPARIN SODIUM 40 MG: 40 INJECTION SUBCUTANEOUS at 08:32

## 2021-04-16 RX ADMIN — GUAIFENESIN 600 MG: 600 TABLET, EXTENDED RELEASE ORAL at 17:13

## 2021-04-16 RX ADMIN — OXYCODONE HYDROCHLORIDE AND ACETAMINOPHEN 1.5 TABLET: 5; 325 TABLET ORAL at 20:01

## 2021-04-16 RX ADMIN — INSULIN LISPRO 6 UNITS: 100 INJECTION, SOLUTION INTRAVENOUS; SUBCUTANEOUS at 11:09

## 2021-04-16 RX ADMIN — CYCLOBENZAPRINE HYDROCHLORIDE 10 MG: 10 TABLET, FILM COATED ORAL at 08:32

## 2021-04-16 RX ADMIN — BUDESONIDE 0.5 MG: 0.5 INHALANT ORAL at 19:37

## 2021-04-16 RX ADMIN — TAMSULOSIN HYDROCHLORIDE 0.4 MG: 0.4 CAPSULE ORAL at 17:12

## 2021-04-16 RX ADMIN — IPRATROPIUM BROMIDE 0.5 MG: 0.5 SOLUTION RESPIRATORY (INHALATION) at 19:37

## 2021-04-16 RX ADMIN — INSULIN LISPRO 1 UNITS: 100 INJECTION, SOLUTION INTRAVENOUS; SUBCUTANEOUS at 21:33

## 2021-04-16 RX ADMIN — FUROSEMIDE 40 MG: 40 TABLET ORAL at 08:31

## 2021-04-16 RX ADMIN — BUDESONIDE 0.5 MG: 0.5 INHALANT ORAL at 07:07

## 2021-04-16 RX ADMIN — INSULIN LISPRO 2 UNITS: 100 INJECTION, SOLUTION INTRAVENOUS; SUBCUTANEOUS at 17:10

## 2021-04-16 RX ADMIN — PANTOPRAZOLE SODIUM 40 MG: 40 TABLET, DELAYED RELEASE ORAL at 06:46

## 2021-04-16 RX ADMIN — LEVALBUTEROL HYDROCHLORIDE 1.25 MG: 1.25 SOLUTION, CONCENTRATE RESPIRATORY (INHALATION) at 07:07

## 2021-04-16 RX ADMIN — METHYLPREDNISOLONE SODIUM SUCCINATE 40 MG: 40 INJECTION, POWDER, FOR SOLUTION INTRAMUSCULAR; INTRAVENOUS at 17:13

## 2021-04-16 RX ADMIN — OMEGA-3 FATTY ACIDS CAP 1000 MG 1000 MG: 1000 CAP at 17:13

## 2021-04-16 RX ADMIN — CYCLOBENZAPRINE HYDROCHLORIDE 10 MG: 10 TABLET, FILM COATED ORAL at 21:33

## 2021-04-16 RX ADMIN — IPRATROPIUM BROMIDE 0.5 MG: 0.5 SOLUTION RESPIRATORY (INHALATION) at 13:35

## 2021-04-16 RX ADMIN — INSULIN HUMAN 45 UNITS: 500 INJECTION, SOLUTION SUBCUTANEOUS at 17:13

## 2021-04-16 RX ADMIN — INSULIN LISPRO 8 UNITS: 100 INJECTION, SOLUTION INTRAVENOUS; SUBCUTANEOUS at 06:47

## 2021-04-16 NOTE — ASSESSMENT & PLAN NOTE
Lab Results   Component Value Date    HGBA1C 9 2 (H) 04/14/2021       Recent Labs     04/15/21  2122 04/15/21  2347 04/16/21  0636 04/16/21  1053   POCGLU 331* 336* 332* 290*       Blood Sugar Average: Last 72 hrs:  (P) 322 [id number]     · Uncontrolled hyperglycemia  · continue Humulin U 45 units twice daily with sliding scale and Accu-Cheks per protocol  increased to algo 5  · Monitor glucose closely with steroids , transitioned to po prednisone today monitor on steroid   · Consult endocrinology - appreciate input

## 2021-04-16 NOTE — NUTRITION
Provided DM/low Na diet education, verbal and written  Written material includes: Sodium content of foods, List of foods low in sodium, DM diet/ guidelines, general diet information for CHF   Pt did not have questions, pt cooks for himself and his son, expect changes will take place gradually

## 2021-04-16 NOTE — ASSESSMENT & PLAN NOTE
Lab Results   Component Value Date    EGFR 55 04/16/2021    EGFR 50 04/15/2021    EGFR 43 04/15/2021    CREATININE 1 29 04/16/2021    CREATININE 1 38 (H) 04/15/2021    CREATININE 1 58 (H) 04/15/2021   · creat baseline appears to be 1 2-1 5 range   · Repeat bmp now given concern for hemolyzed sample   · Avoid nephrotoxic drugs and hypotension

## 2021-04-16 NOTE — ASSESSMENT & PLAN NOTE
Lab Results   Component Value Date    HGBA1C 9 2 (H) 04/14/2021       Recent Labs     04/16/21  0636 04/16/21  1053 04/16/21  1218 04/16/21  1627   POCGLU 332* 290* 225* 211*     Uncontrolled with the most recent A1c of 9 2%  The goal is less than 7 5%  Home regiment: 45 units before breakfast, 45 units before lunch and 35 units before dinner  Patient's blood sugar above goal, and patient developed steroid induced hyperglycemia as well  We increased  to 55 units before breakfast, 55 units before lunch and 45 units before dinner  Continue correctional sliding scale    Continue to monitor blood sugar over time and make adjustments to the regimen if necessary

## 2021-04-16 NOTE — ASSESSMENT & PLAN NOTE
· CTA chest 4/14- Moderate right pleural effusion  Mild compressive atelectasis in the right lung   Few airspace opacities in the upper lung could represent mild aspiration or pneumonia  · procal x1 negative   · Pulmonary consult  · Continue nebs  · Respiratory protocol    · Continue intravenous steroid treatment:  Solu-Medrol 40 mg, weaned from Q8 to BID 4/16 transitioned to po today    · Mucinex 600 mg q 12     · C/w home o2 at [facility]

## 2021-04-16 NOTE — ASSESSMENT & PLAN NOTE
Lab Results   Component Value Date    EGFR 55 04/16/2021    EGFR 50 04/15/2021    EGFR 43 04/15/2021    CREATININE 1 29 04/16/2021    CREATININE 1 38 (H) 04/15/2021    CREATININE 1 58 (H) 04/15/2021   · creat baseline appears to be 1 2-1 5 range   · Currently at baseline 1 23  · Was placed on bid diuretic which pt is refusing at this time feels he doesn't need it   · Also complaining of frequency no burning and bladder scan does not reveal retention

## 2021-04-16 NOTE — ASSESSMENT & PLAN NOTE
Wt Readings from Last 3 Encounters:   04/16/21 112 kg (247 lb 9 2 oz)   03/12/21 127 kg (279 lb)   11/20/20 122 kg (270 lb)     · Most likely current difficulty with breathing is secondary to COPD exacerbation plan as the accumulation of moderate right pleural effusion in conjunction with adeno carcinoma    · C/w lasix 40mg BID, however pt refusing bid states he only takes it daily    · Follows outpatient with Dr Veda Ureña

## 2021-04-16 NOTE — PROGRESS NOTES
Progress Note - Pulmonary   Markel Jones Sr  70 y o  male MRN: [id number]  Unit/Bed#: [facility] 314-01 Encounter: [id number]      Assessment:     1   chronic hypoxic respiratory failure secondary to severe COPD on 3 L nasal cannula and Trelegy 1 puff daily   2   severe COPD with mild acute exacerbation  3  right recurrent pleural effusion- 2 previous thoracentesis in July and November 2020  Initial thoracentesis in July transudative with negative cytology  Unfortunately repeat thoracentesis in November was not sent for full fluid analysis, but cytology was also negative for carcinoma  4  right apical pulmonary  Adenocarcinoma currently on radiation, planning for repeat PET-CT as outpatient  5  Severe pulm HTN- Group III due to severe COPD  6   chronic diastolic CHF  7  Suspect JACQUELINE/OHS  8  Hx of nicotine dependence     Plan:     ·  currently on home 3 L nasal cannula, continue to monitor SpO2, out of bed to chair, incentive spirometry, goal SpO2 greater than 88%  · Continue atrovent/xopenex and pulmicort, home regime Trelegy 1 puff daily, prn nebs/mdi  ·  taper Solu-Medrol 40 mg to BID for now,  Switch to prednisone 40 mg daily tomorrow and taper by 10 mg every 3 days until off  Patient will follow up with Pulmonary as outpatient  ·  status post right thoracentesis yesterday 1 7 L serosanguineous fluid removed, fluid analysis consistent with exudative process by protein  Given this, some concern for malignant pleural effusion, will follow cytology/cell count  · Continue Lasix 40mg BID  ·  will need outpatient follow-up with Radiation Oncology regarding lung adenocarcinoma, f/u PET CT   ·  needs aggressive glucose control given steroid use and poorly controlled diabetes        Patient is stable from pulmonary standpoint with above recommendations  He will transition to prednisone tomorrow with above   Taper and will follow-up in the outpatient pulmonary office with Dr Cachorro Leung    Will call patient  With results of cytology /cell count  Will sign off  Please call with questions / re-consult as   Needed        Subjective:    patient seen and examined at bedside  Feels that his breathing is much better than yesterday, still with intermittent shortness of breath and nonproductive cough  Denies fever, chills, nausea, vomiting, chest pain  Underwent bedside thoracentesis with 1 7 L  Serosanguineous fluid removed from right yesterday, fluid studies consistent with exudative process  Review of Systems   Constitutional: Negative for chills, fever and unexpected weight change  HENT: Negative for congestion, rhinorrhea, sneezing and sore throat  Respiratory: Positive for cough and shortness of breath  Negative for chest tightness and wheezing  Cardiovascular: Negative for chest pain, palpitations and leg swelling  Gastrointestinal: Negative for abdominal pain, constipation, diarrhea, nausea and vomiting  Genitourinary: Negative for dysuria  Musculoskeletal: Negative for arthralgias  Neurological: Negative for dizziness and numbness  Objective:     Vitals:    04/15/21 1952 04/15/21 2300 04/16/21 0600 04/16/21 0708   BP:  122/60  138/65   BP Location:  Right arm  Right arm   Pulse:  79  94   Resp:  20  17   Temp:  97 8 °F (36 6 °C)  97 6 °F (36 4 °C)   TempSrc:  Oral  Oral   SpO2: 97% 93%  94%   Weight:   112 kg (247 lb 9 2 oz)          No intake or output data in the 24 hours ending 04/16/21 0844      Physical Exam  Constitutional:       General: He is not in acute distress  Appearance: He is well-developed  He is not diaphoretic  Comments: Obese   HENT:      Head: Normocephalic and atraumatic  Mouth/Throat:      Mouth: Mucous membranes are moist       Pharynx: Oropharynx is clear  No oropharyngeal exudate  Eyes:      General: No scleral icterus  Cardiovascular:      Rate and Rhythm: Normal rate and regular rhythm  Heart sounds: Normal heart sounds  No murmur     Pulmonary:      Effort: Pulmonary effort is normal  No respiratory distress  Breath sounds: No wheezing  Comments: Decreased breath sounds with few scattered wheezes  Abdominal:      General: Bowel sounds are normal  There is no distension  Palpations: Abdomen is soft  Tenderness: There is no abdominal tenderness  Musculoskeletal:      Right lower leg: Edema present  Left lower leg: Edema present  Comments: Venous stasis changes bilateral lower extremities   Neurological:      Mental Status: He is alert and oriented to person, place, and time  Labs: I have personally reviewed pertinent lab results    Results from last 7 days   Lab Units 04/16/21  0629 04/15/21  0441 04/14/21  1541   WBC Thousand/uL 9 57 7 41 8 44   HEMOGLOBIN g/dL 10 7* 10 6* 11 3*   HEMATOCRIT % 35 6* 34 7* 37 7   PLATELETS Thousands/uL 155 166 147*   NEUTROS PCT %  --   --  83*   MONOS PCT %  --   --  6   MONO PCT %  --  1*  --       Results from last 7 days   Lab Units 04/16/21  0629 04/15/21  1316 04/15/21  0442 04/14/21  1541   POTASSIUM mmol/L 4 8 4 8 5 4* 4 9   CHLORIDE mmol/L 96* 95* 94* 97*   CO2 mmol/L 37* 35* 32 35*   BUN mg/dL 34* 28* 24 18   CREATININE mg/dL 1 29 1 38* 1 58* 1 26   CALCIUM mg/dL 8 7 9 1 8 8 8 8   ALK PHOS U/L  --   --  80 89   ALT U/L  --   --  15 16   AST U/L  --   --  23 21     Results from last 7 days   Lab Units 04/15/21  0442   MAGNESIUM mg/dL 2 1     Results from last 7 days   Lab Units 04/15/21  0442   PHOSPHORUS mg/dL 3 2      Results from last 7 days   Lab Units 04/15/21  0442   INR  1 01   PTT seconds 28         0   Lab Value Date/Time    TROPONINI <0 02 04/14/2021 1541    TROPONINI <0 02 07/27/2020 2043    TROPONINI <0 02 06/14/2019 2119    TROPONINI <0 02 06/14/2019 1804    TROPONINI <0 02 06/14/2019 1325    TROPONINI <0 02 06/19/2018 0750    TROPONINI <0 02 06/19/2018 0445    TROPONINI 0 02 05/24/2018 0500    TROPONINI 0 03 05/24/2018 0045    TROPONINI 0 06 (H) 05/23/2018 1252    TROPONINI <0 02 04/27/2018 1522    TROPONINI 0 20 (H) 02/02/2018 0239    TROPONINI 0 25 (H) 02/01/2018 2332    TROPONINI 0 27 (H) 02/01/2018 2043    TROPONINI 0 12 (H) 02/01/2018 1400    TROPONINI <0 02 11/01/2017 0440    TROPONINI <0 02 11/01/2017 0108    TROPONINI 0 10 (H) 09/28/2017 2005    TROPONINI 0 10 (H) 09/28/2017 1730    TROPONINI <0 02 11/13/2016 0014    TROPONINI <0 02 11/12/2016 2018    TROPONINI <0 02 01/21/2016 0543    TROPONINI <0 02 01/21/2016 0129    TROPONINI <0 02 01/20/2016 1217    TROPONINI <0 02 01/20/2016 0347    TROPONINI <0 02 01/20/2016 0141    TROPONINI <0 02 09/09/2015 0530    TROPONINI <0 02 09/09/2015 0041    TROPONINI 0 04 03/23/2015 1443    TROPONINI <0 04 03/23/2015 0629    TROPONINI <0 04 03/20/2014 0614    TROPONINI <0 04 03/20/2014 0149       Imaging and other studies: I have personally reviewed pertinent reports  and I have personally reviewed pertinent films in PACS   CTA chest 04/14/2021   negative for PE, moderate right pleural effusion with compressive atelectasis and volume loss     chest x-ray 4/16/21   decreased right pleural effusion with persistent right lung mass and volume loss atelectasis     Pulmonary function testing:   10/14/20  Results:  FEV1/FVC Ratio: 41 %  Forced Vital Capacity: 2 72 L    61 % predicted  FEV1: 1 11 L     33 % predicted     Lung volumes by body plethysmography:   Total Lung Capacity 89 % predicted   Residual volume 144 % predicted     DLCO corrected for patients hemoglobin level: 36 %     Interpretation:     · Severe obstructive airflow defect     · Normal TLC  with increased residual volume indicative of air trapping     · Severe diffusion defect after correction for HGB     · Increased airway resistance, as indicated by specific airway conductance     · Severe obstructive pattern on flow volume loops  ·       EKG, Pathology, and Other Studies: I have personally reviewed pertinent reports      TTE 7/30/20  EF 55%, Grade 1 diastolic  Dysfunction, dilated RV with reduced function     Code Status: Level 1 - Full Code        Katharine Cushing, MD  Pulmonary & Critical Care Fellow, [facility]

## 2021-04-16 NOTE — PLAN OF CARE
Problem: Potential for Falls  Goal: Patient will remain free of falls  Description: INTERVENTIONS:  - Assess patient frequently for physical needs  -  Identify cognitive and physical deficits and behaviors that affect risk of falls    -  [place] fall precautions as indicated by assessment   - Educate patient/family on patient safety including physical limitations  - Instruct patient to call for assistance with activity based on assessment  - Modify environment to reduce risk of injury  - Consider OT/PT consult to assist with strengthening/mobility  Outcome: Progressing

## 2021-04-16 NOTE — ASSESSMENT & PLAN NOTE
· Due to right lung adenocarcinoma evidenced by recurrent pleural effusions requiring pulmonary consult and thoracentesis  · CTA chest 4/14- Moderate right pleural effusion  · Has had thoracentesis in past   · Pulm following, s/p thoracentesis on 4/15 with 1 7 L removed  · F/u cytology

## 2021-04-16 NOTE — ASSESSMENT & PLAN NOTE
· CTA chest 4/14- Moderate right pleural effusion  · Has had thoracentesis in past   · Pulm following, s/p thoracentesis on 4/15 with 1 7 L removed  · F/u cytology

## 2021-04-16 NOTE — ASSESSMENT & PLAN NOTE
Lab Results   Component Value Date    HGBA1C 9 2 (H) 04/14/2021       Recent Labs     04/15/21  1651 04/15/21  2122 04/15/21  2347 04/16/21  0636   POCGLU 279* 331* 336* 332*       Blood Sugar Average: Last 72 hrs:  (P) 326 875     · Uncontrolled hyperglycemia  · continue Humulin U 45 units twice daily with sliding scale and Accu-Cheks per protocol    · Monitor glucose closely with steroids   · Consult endocrinology

## 2021-04-16 NOTE — PROGRESS NOTES
[address] [facility]  Progress Note - Hay Schaffer Sr  1945, 70 y o  male MRN: [id number]  Unit/Bed#: [facility] 314-01 Encounter: [id number]  Primary Care Provider: Shirley Vega MD   Date and time admitted to hospital: 4/14/2021  3:19 PM    * COPD, severe ([address] )  Assessment & Plan  · CTA chest 4/14- Moderate right pleural effusion  Mild compressive atelectasis in the right lung   Few airspace opacities in the upper lung could represent mild aspiration or pneumonia  · procal x1 negative   · Pulmonary consult  · Continue nebs  · Respiratory protocol  · Continue intravenous steroid treatment:  Solu-Medrol 40 mg, wean from Q8 to BID today   · Mucinex 600 mg q 12     · C/w home o2 at [facility]    Hyperkalemia  Assessment & Plan  · Resolved     Chronic respiratory failure with hypoxia (HCC)  Assessment & Plan  · In the setting of severe COPD   · C/w 3LNC which he is on at home     Primary adenocarcinoma of upper lobe of right lung [facility])  Assessment & Plan  Pulmonary consult placed     DDD (degenerative disc disease), lumbar  Assessment & Plan  · PDMP reviewed   · Last RX for oxy 5mg filled on 7/31/2020    Essential hypertension  Assessment & Plan  Continue metoprolol tartrate 25 mg twice daily  On Lasix 40 mg twice daily  Chronic diastolic heart failure (HCC)  Assessment & Plan  Wt Readings from Last 3 Encounters:   04/16/21 112 kg (247 lb 9 2 oz)   03/12/21 127 kg (279 lb)   11/20/20 122 kg (270 lb)     · Most likely current difficulty with breathing is secondary to COPD exacerbation plan as the accumulation of moderate right pleural effusion in conjunction with adeno carcinoma    · C/w lasix 40mg BID   · Follows outpatient with Dr Constance Prather     Pleural effusion on right  Assessment & Plan  · CTA chest 4/14- Moderate right pleural effusion  · Has had thoracentesis in past   · Pulm following, s/p thoracentesis on 4/15 with 1 7 L removed  · F/u cytology     Chronic kidney disease, stage 3 [facility])  Assessment & Plan  Lab Results   Component Value Date    EGFR 55 04/16/2021    EGFR 50 04/15/2021    EGFR 43 04/15/2021    CREATININE 1 29 04/16/2021    CREATININE 1 38 (H) 04/15/2021    CREATININE 1 58 (H) 04/15/2021   · creat baseline appears to be 1 2-1 5 range   · Currently at baseline    Type 2 diabetes mellitus with hyperglycemia, with long-term current use of insulin [facility])  Assessment & Plan  Lab Results   Component Value Date    HGBA1C 9 2 (H) 04/14/2021       Recent Labs     04/15/21  2122 04/15/21  2347 04/16/21  0636 04/16/21  1053   POCGLU 331* 336* 332* 290*       Blood Sugar Average: Last 72 hrs:  (P) 322 [id number]     · Uncontrolled hyperglycemia  · continue Humulin U 45 units twice daily with sliding scale and Accu-Cheks per protocol  · Monitor glucose closely with steroids   · Consult endocrinology    Dyslipidemia  Assessment & Plan  Currently on Pravachol 80 mg daily  Obesity (BMI 30-39  9)  Assessment & Plan  Patient would need nutritional support and advise regarding weight loss as out patient  BMI 37      VTE Pharmacologic Prophylaxis:   Pharmacologic: Enoxaparin (Lovenox)  Mechanical VTE Prophylaxis in Place: Yes    Patient Centered Rounds: I have performed bedside rounds with nursing staff today  Discussions with Specialists or Other Care Team Provider: RN    Education and Discussions with Family / Patient: patient, declined call to family    Time Spent for Care: 20 minutes  More than 50% of total time spent on counseling and coordination of care as described above  Current Length of Stay: 2 day(s)    Current Patient Status: Inpatient   Certification Statement: The patient will continue to require additional inpatient hospital stay due to COPD exacerbation on IV steroids    Discharge Plan: 24-48 hrs when on PO prednisone and cleared by pulm    Code Status: Level 1 - Full Code      Subjective: The patient reports he feels better today       Objective:     Vitals: Temp (24hrs), Av 7 °F (36 5 °C), Min:97 6 °F (36 4 °C), Max:97 8 °F (36 6 °C)    Temp:  [97 6 °F (36 4 °C)-97 8 °F (36 6 °C)] 97 6 °F (36 4 °C)  HR:  [79-94] 94  Resp:  [17-20] 17  BP: (122-146)/(60-67) 138/65  SpO2:  [93 %-97 %] 94 %  Body mass index is 33 58 kg/m²  Input and Output Summary (last 24 hours):     No intake or output data in the 24 hours ending 21 1127    Physical Exam:     Physical Exam  Vitals signs reviewed  Constitutional:       General: He is not in acute distress  Appearance: He is not toxic-appearing  HENT:      Head: Normocephalic and atraumatic  Eyes:      General: No scleral icterus  Extraocular Movements: Extraocular movements intact  Neck:      Musculoskeletal: Normal range of motion  Cardiovascular:      Rate and Rhythm: Normal rate and regular rhythm  Pulmonary:      Effort: Pulmonary effort is normal       Breath sounds: Decreased breath sounds present  Abdominal:      General: Bowel sounds are normal  There is no distension  Palpations: Abdomen is soft  Musculoskeletal: Normal range of motion  Skin:     General: Skin is warm  Neurological:      General: No focal deficit present  Mental Status: He is alert and oriented to person, place, and time  Psychiatric:         Mood and Affect: Mood normal          Behavior: Behavior normal          Thought Content:  Thought content normal            Additional Data:     Labs:    Results from last 7 days   Lab Units 21  0629 04/15/21  0441 21  1541   WBC Thousand/uL 9 57 7 41 8 44   HEMOGLOBIN g/dL 10 7* 10 6* 11 3*   HEMATOCRIT % 35 6* 34 7* 37 7   PLATELETS Thousands/uL 155 166 147*   NEUTROS PCT %  --   --  83*   LYMPHS PCT %  --   --  9*   LYMPHO PCT %  --  2*  --    MONOS PCT %  --   --  6   MONO PCT %  --  1*  --    EOS PCT %  --  2 1     Results from last 7 days   Lab Units 21  0629  04/15/21  0442   SODIUM mmol/L 134*   < > 132*   POTASSIUM mmol/L 4 8   < > 5 4* CHLORIDE mmol/L 96*   < > 94*   CO2 mmol/L 37*   < > 32   BUN mg/dL 34*   < > 24   CREATININE mg/dL 1 29   < > 1 58*   ANION GAP mmol/L 1*   < > 6   CALCIUM mg/dL 8 7   < > 8 8   ALBUMIN g/dL  --   --  2 9*   TOTAL BILIRUBIN mg/dL  --   --  0 34   ALK PHOS U/L  --   --  80   ALT U/L  --   --  15   AST U/L  --   --  23   GLUCOSE RANDOM mg/dL 302*   < > 448*    < > = values in this interval not displayed  Results from last 7 days   Lab Units 04/15/21  0442   INR  1 01     Results from last 7 days   Lab Units 04/16/21  1053 04/16/21  0636 04/15/21  2347 04/15/21  2122 04/15/21  1651 04/15/21  1416 04/15/21  1133 04/15/21  0735 04/15/21  0212 04/15/21  0042   POC GLUCOSE mg/dl 290* 332* 336* 331* 279* 283* 244* 319* 491* >500*     Results from last 7 days   Lab Units 04/14/21  2139   HEMOGLOBIN A1C % 9 2*     Results from last 7 days   Lab Units 04/15/21  0442   PROCALCITONIN ng/ml 0 08           * I Have Reviewed All Lab Data Listed Above  * Additional Pertinent Lab Tests Reviewed:  All Labs Within Last 24 Hours Reviewed    Imaging:    Imaging Reports Reviewed Today Include: none  Imaging Personally Reviewed by Myself Includes:  none    Recent Cultures (last 7 days):     Results from last 7 days   Lab Units 04/15/21  1010 04/15/21  0453   GRAM STAIN RESULT  1+ Polys  No bacteria seen 1+ Epithelial cells per low power field*  No Polys*  2+ Gram positive cocci in pairs*  1+ Gram negative rods*       Last 24 Hours Medication List:   Current Facility-Administered Medications   Medication Dose Route Frequency Provider Last Rate    acetaminophen  650 mg Oral Q6H PRN Beronica Todd MD      aluminum-magnesium hydroxide-simethicone  30 mL Oral Q6H PRN Beronica Todd MD      aspirin  81 mg Oral Daily Beronica Todd MD      budesonide  0 5 mg Nebulization Q12H Ashley Love MD      clotrimazole   Topical BID Beronica Todd MD      cyclobenzaprine  10 mg Oral TID Beronica Todd MD      docusate sodium  100 mg Oral BID PRN Génesis Spears MD      enoxaparin  40 mg Subcutaneous Daily Génesis Spears MD      ferrous sulfate  325 mg Oral Daily With Breakfast Génesis Spears MD      fish oil  1,000 mg Oral BID Génesis Spears MD      furosemide  40 mg Oral BID Génesis Spears MD      guaiFENesin  600 mg Oral BID Génesis Spears MD      insulin lispro  2-12 Units Subcutaneous Q6H [facility] Génesis Spears MD      insulin regular  35 Units Subcutaneous HS Génesis Spears MD      insulin regular  45 Units Subcutaneous BID AC Génesis Spears MD      ipratropium  0 5 mg Nebulization TID Génesis Spears MD      levalbuterol  1 25 mg Nebulization Q6H PRN Génesis Spears MD      And    sodium chloride  3 mL Nebulization Q6H PRN Génesis Spears MD      levalbuterol  1 25 mg Nebulization TID Génesis Spears MD      methylPREDNISolone sodium succinate  40 mg Intravenous Q12H [facility] Lamont Argueta MD      metoprolol tartrate  25 mg Oral BID Génesis Spears MD      ondansetron  4 mg Intravenous Q6H PRN Génesis Spears MD      oxyCODONE-acetaminophen  1 5 tablet Oral Q6H PRN Génesis Spears MD      pantoprazole  40 mg Oral Daily Before Breakfast Génesis Spears MD      pravastatin  80 mg Oral Daily With Dawit Moreno MD      tamsulosin  0 4 mg Oral Daily With Dawit Moreno MD          Today, Patient Was Seen By: José Miguel Barrientos PA-C    ** Please Note: Dictation voice to text software may have been used in the creation of this document   **

## 2021-04-16 NOTE — ASSESSMENT & PLAN NOTE
Wt Readings from Last 3 Encounters:   04/16/21 112 kg (247 lb 9 2 oz)   03/12/21 127 kg (279 lb)   11/20/20 122 kg (270 lb)     · Most likely current difficulty with breathing is secondary to COPD exacerbation plan as the accumulation of moderate right pleural effusion in conjunction with adeno carcinoma    · C/w lasix 40mg BID   · Follows outpatient with Dr Cass Grover

## 2021-04-16 NOTE — PLAN OF CARE
Problem: Potential for Falls  Goal: Patient will remain free of falls  Description: INTERVENTIONS:  - Assess patient frequently for physical needs  -  Identify cognitive and physical deficits and behaviors that affect risk of falls    -  [place] fall precautions as indicated by assessment   - Educate patient/family on patient safety including physical limitations  - Instruct patient to call for assistance with activity based on assessment  - Modify environment to reduce risk of injury  - Consider OT/PT consult to assist with strengthening/mobility  Outcome: Progressing     Problem: PAIN - ADULT  Goal: Verbalizes/displays adequate comfort level or baseline comfort level  Description: Interventions:  - Encourage patient to monitor pain and request assistance  - Assess pain using appropriate pain scale  - Administer analgesics based on type and severity of pain and evaluate response  - Implement non-pharmacological measures as appropriate and evaluate response  - Consider cultural and social influences on pain and pain management  - Notify physician/advanced practitioner if interventions unsuccessful or patient reports new pain  Outcome: Progressing     Problem: INFECTION - ADULT  Goal: Absence or prevention of progression during hospitalization  Description: INTERVENTIONS:  - Assess and monitor for signs and symptoms of infection  - Monitor lab/diagnostic results  - Monitor all insertion sites, i e  indwelling lines, tubes, and drains  - Monitor endotracheal if appropriate and nasal secretions for changes in amount and color  - [place] appropriate cooling/warming therapies per order  - Administer medications as ordered  - Instruct and encourage patient and family to use good hand hygiene technique  - Identify and instruct in appropriate isolation precautions for identified infection/condition  Outcome: Progressing  Goal: Absence of fever/infection during neutropenic period  Description: INTERVENTIONS:  - Monitor WBC    Outcome: Progressing     Problem: SAFETY ADULT  Goal: Patient will remain free of falls  Description: INTERVENTIONS:  - Assess patient frequently for physical needs  -  Identify cognitive and physical deficits and behaviors that affect risk of falls    -  [place] fall precautions as indicated by assessment   - Educate patient/family on patient safety including physical limitations  - Instruct patient to call for assistance with activity based on assessment  - Modify environment to reduce risk of injury  - Consider OT/PT consult to assist with strengthening/mobility  Outcome: Progressing  Goal: Maintain or return to baseline ADL function  Description: INTERVENTIONS:  -  Assess patient's ability to carry out ADLs; assess patient's baseline for ADL function and identify physical deficits which impact ability to perform ADLs (bathing, care of mouth/teeth, toileting, grooming, dressing, etc )  - Assess/evaluate cause of self-care deficits   - Assess range of motion  - Assess patient's mobility; develop plan if impaired  - Assess patient's need for assistive devices and provide as appropriate  - Encourage maximum independence but intervene and supervise when necessary  - Involve family in performance of ADLs  - Assess for home care needs following discharge   - Consider OT consult to assist with ADL evaluation and planning for discharge  - Provide patient education as appropriate  Outcome: Progressing  Goal: Maintain or return mobility status to optimal level  Description: INTERVENTIONS:  - Assess patient's baseline mobility status (ambulation, transfers, stairs, etc )    - Identify cognitive and physical deficits and behaviors that affect mobility  - Identify mobility aids required to assist with transfers and/or ambulation (gait belt, sit-to-stand, lift, walker, cane, etc )  - [place] fall precautions as indicated by assessment  - Record patient progress and toleration of activity level on Mobility SBAR; progress patient to next Phase/Stage  - Instruct patient to call for assistance with activity based on assessment  - Consider rehabilitation consult to assist with strengthening/weightbearing, etc   Outcome: Progressing     Problem: DISCHARGE PLANNING  Goal: Discharge to home or other facility with appropriate resources  Description: INTERVENTIONS:  - Identify barriers to discharge w/patient and caregiver  - Arrange for needed discharge resources and transportation as appropriate  - Identify discharge learning needs (meds, wound care, etc )  - Arrange for interpretive services to assist at discharge as needed  - Refer to Case Management Department for coordinating discharge planning if the patient needs post-hospital services based on physician/advanced practitioner order or complex needs related to functional status, cognitive ability, or social support system  Outcome: Progressing     Problem: Knowledge Deficit  Goal: Patient/family/caregiver demonstrates understanding of disease process, treatment plan, medications, and discharge instructions  Description: Complete learning assessment and assess knowledge base    Interventions:  - Provide teaching at level of understanding  - Provide teaching via preferred learning methods  Outcome: Progressing

## 2021-04-16 NOTE — ASSESSMENT & PLAN NOTE
· CTA chest 4/14- Moderate right pleural effusion  Mild compressive atelectasis in the right lung   Few airspace opacities in the upper lung could represent mild aspiration or pneumonia  · procal x1 negative   · Pulmonary consult  · Continue nebs  · Respiratory protocol    · Continue intravenous steroid treatment:  Solu-Medrol 40 mg, wean from Q8 to BID today   · Mucinex 600 mg q 12     · C/w home o2 at [facility]

## 2021-04-16 NOTE — CONSULTS
Consultation - Jude Boudreaux Sr  70 y o  male MRN: [id number]    Unit/Bed#: [facility] 314-01 Encounter: [id number]      Assessment/Plan     Chronic respiratory failure with hypoxia [facility])  Assessment & Plan  Management per primary team      Type 2 diabetes mellitus with hyperglycemia, with long-term current use of insulin [facility])  Assessment & Plan  Lab Results   Component Value Date    HGBA1C 9 2 (H) 04/14/2021       Recent Labs     04/16/21  0636 04/16/21  1053 04/16/21  1218 04/16/21  1627   POCGLU 332* 290* 225* 211*     Uncontrolled with the most recent A1c of 9 2%  The goal is less than 7 5%  Home regiment: 45 units before breakfast, 45 units before lunch and 35 units before dinner  Patient's blood sugar above goal, and patient developed steroid induced hyperglycemia as well  We increased  to 55 units before breakfast, 55 units before lunch and 45 units before dinner  Continue correctional sliding scale  Continue to monitor blood sugar over time and make adjustments to the regimen if necessary            CC: Diabetes Consult    History of Present Illness     HPI: Jude Boudreaux Sr  is a 70y o  year old male with type 2 diabetes on long-term use of insulin, hypertension, hyperlipidemia, CAD and COPD who is admitted for COPD exacerbation, patient has been started on IV steroid which tapered down to 40 mg IV b i d  He has type 2 diabetes for more than 20 years, on U 500, 45 units before breakfast, 45 units before lunch and 35 units before dinner  Patient performs home glucose monitoring 3 times daily and he reports his blood sugar has been high, ranging from 250-300 mg/dl, however at times are above 500  He denies any recent admission for hyperglycemia or hypoglycemia  His diabetes is complicated by CAD, neuropathy and nephropathy, he denies stroke, claudication or retinopathy                Inpatient consult to Endocrinology     Performed by  Edyta Stewart MD     Authorized by Dk Lee PA-C Review of Systems   Constitutional: Negative for activity change, appetite change, chills, diaphoresis, fatigue, fever and unexpected weight change  HENT: Negative for congestion, drooling, ear discharge, ear pain, trouble swallowing and voice change  Eyes: Negative for photophobia, pain, discharge, redness, itching and visual disturbance  Respiratory: Positive for shortness of breath  Negative for cough, chest tightness and wheezing  Cardiovascular: Negative for chest pain, palpitations and leg swelling  Gastrointestinal: Negative for abdominal distention, abdominal pain, blood in stool, diarrhea, nausea and vomiting  Endocrine: Negative for cold intolerance, heat intolerance, polydipsia, polyphagia and polyuria  Genitourinary: Negative for dysuria, flank pain, frequency and hematuria  Musculoskeletal: Negative for back pain, gait problem, joint swelling, myalgias, neck pain and neck stiffness  Skin: Negative for color change, pallor, rash and wound  Neurological: Negative for dizziness, tremors, seizures, syncope, speech difficulty, weakness, numbness and headaches  Psychiatric/Behavioral: Negative for agitation         Historical Information   Past Medical History:   Diagnosis Date    Abdominal pain     Cardiac disease     CHF (congestive heart failure) (HCC)     COPD, severe (HCC)     Coronary artery disease     DDD (degenerative disc disease), lumbar 9/1/2020    Diabetes mellitus ([facility] )     Dyspnea     GERD (gastroesophageal reflux disease)     Hyperlipidemia     Hypertension     MI (myocardial infarction) ([facility] )     with 3 stents    Nodule of apex of right lung     JACQUELINE (obstructive sleep apnea)     Prostate cancer [facility])      Past Surgical History:   Procedure Laterality Date    ABDOMINAL SURGERY      exploratory    ANGIOPLASTY      3 stents    APPENDECTOMY      COLONOSCOPY  2015    CT NEEDLE BIOPSY LUNG  11/3/2020    ESOPHAGOGASTRODUODENOSCOPY N/A 10/2/2017 Procedure: ESOPHAGOGASTRODUODENOSCOPY (EGD); Surgeon: Jacinda Meyers MD;  Location:  GI LAB;   Service: Gastroenterology    IR THORACENTESIS  11/3/2020    KNEE CARTILAGE SURGERY      OTHER SURGICAL HISTORY      stent placement    PROSTATE SURGERY      SKIN GRAFT      Basal cell CA back     Social History   Social History     Substance and Sexual Activity   Alcohol Use Not Currently    Frequency: Never    Binge frequency: Never     Social History     Substance and Sexual Activity   Drug Use No     Social History     Tobacco Use   Smoking Status Former Smoker    Packs/day: 1 50    Years: 50 00    Pack years: 75 00    Quit date: 2020    Years since quittin 7   Smokeless Tobacco Never Used     Family History:   Family History   Problem Relation Age of Onset    Heart disease Father     Other Father         Mesothelioma        Meds/Allergies   Current Facility-Administered Medications   Medication Dose Route Frequency Provider Last Rate Last Admin    acetaminophen (TYLENOL) tablet 650 mg  650 mg Oral Q6H PRN Angie Rothman MD   650 mg at 21 213    aluminum-magnesium hydroxide-simethicone (MYLANTA) oral suspension 30 mL  30 mL Oral Q6H PRN Angie Rothman MD        aspirin (ECOTRIN LOW STRENGTH) EC tablet 81 mg  81 mg Oral Daily Angie Rothman MD   81 mg at 21 0832    budesonide (PULMICORT) inhalation solution 0 5 mg  0 5 mg Nebulization Q12H Stacy Rojas MD   0 5 mg at 21 0707    clotrimazole (LOTRIMIN) 1 % cream   Topical BID Angie Rothman MD   Given at 21 0831    cyclobenzaprine (FLEXERIL) tablet 10 mg  10 mg Oral TID Angie Rothman MD   10 mg at 21 0832    docusate sodium (COLACE) capsule 100 mg  100 mg Oral BID PRN Angie Rothman MD   100 mg at 21 213    enoxaparin (LOVENOX) subcutaneous injection 40 mg  40 mg Subcutaneous Daily Angie Rothman MD   40 mg at 21 2375    ferrous sulfate tablet 325 mg  325 mg Oral Daily With Breakfast Kevin Thornton MD   325 mg at 04/16/21 0646    fish oil capsule 1,000 mg  1,000 mg Oral BID Kevin Thornton MD   1,000 mg at 04/16/21 1108    furosemide (LASIX) tablet 40 mg  40 mg Oral BID Kevin Thornton MD   40 mg at 04/16/21 0831    guaiFENesin (MUCINEX) 12 hr tablet 600 mg  600 mg Oral BID Kevin Thornton MD   600 mg at 04/16/21 0832    insulin lispro (HumaLOG) 100 units/mL subcutaneous injection 2-12 Units  2-12 Units Subcutaneous Q6H [facility] Kevin Thornton MD   6 Units at 04/16/21 1109    insulin regular (HumuLIN R U-500) 500 units/mL CONCENTRATED injection 35 Units  35 Units Subcutaneous HS Kevin Thornton MD   35 Units at 04/15/21 2132    insulin regular (HumuLIN R U-500) 500 units/mL CONCENTRATED injection 45 Units  45 Units Subcutaneous BID AC Kevin Thornton MD   45 Units at 04/16/21 0648    ipratropium (ATROVENT) 0 02 % inhalation solution 0 5 mg  0 5 mg Nebulization TID Kevin Thornton MD   0 5 mg at 04/16/21 1335    levalbuterol (XOPENEX) inhalation solution 1 25 mg  1 25 mg Nebulization Q6H PRN Kevin Thornton MD        And    sodium chloride 0 9 % inhalation solution 3 mL  3 mL Nebulization Q6H PRN Kevin Thornton MD        levalbuterol Edna Boeck) inhalation solution 1 25 mg  1 25 mg Nebulization TID Kevin Thornton MD   1 25 mg at 04/16/21 1334    methylPREDNISolone sodium succinate (Solu-MEDROL) injection 40 mg  40 mg Intravenous Q12H [facility] Hesham Moreno MD        metoprolol tartrate (LOPRESSOR) tablet 25 mg  25 mg Oral BID Kevin Thornton MD   25 mg at 04/16/21 0832    ondansetron (ZOFRAN) injection 4 mg  4 mg Intravenous Q6H PRN Kevin Thornton MD        oxyCODONE-acetaminophen (PERCOCET) 5-325 mg per tablet 1 5 tablet  1 5 tablet Oral Q6H PRN Kevin Thornton MD   1 5 tablet at 04/15/21 2143    pantoprazole (PROTONIX) EC tablet 40 mg  40 mg Oral Daily Before Breakfast Kevin Thornton MD   40 mg at 04/16/21 0646    pravastatin (PRAVACHOL) tablet 80 mg  80 mg Oral Daily With Zack Handy MD   80 mg at 04/15/21 1713    [START ON 4/17/2021] predniSONE tablet 40 mg  40 mg Oral Daily Patrice Ortiz MD        Followed by   Mike Crew ON 4/20/2021] predniSONE tablet 30 mg  30 mg Oral Daily Patrice Ortiz MD        Followed by   Mike Crew ON 4/23/2021] predniSONE tablet 20 mg  20 mg Oral Daily Patrice Ortiz MD        Followed by   Mike Crew ON 4/26/2021] predniSONE tablet 10 mg  10 mg Oral Daily Patrice Ortiz MD        [facility]) capsule 0 4 mg  0 4 mg Oral Daily With Zack Handy MD   0 4 mg at 04/15/21 1712     Allergies   Allergen Reactions    Crestor [Rosuvastatin]     Metformin GI Intolerance       Objective   Vitals: Blood pressure 138/65, pulse 94, temperature 97 6 °F (36 4 °C), temperature source Oral, resp  rate 17, weight 112 kg (247 lb 9 2 oz), SpO2 95 %  Intake/Output Summary (Last 24 hours) at 4/16/2021 1511  Last data filed at 4/16/2021 0830  Gross per 24 hour   Intake 240 ml   Output --   Net 240 ml     Invasive Devices     Peripheral Intravenous Line            Peripheral IV 04/14/21 Left Antecubital 1 day    Peripheral IV 04/14/21 Right Hand 1 day                Physical Exam  Vitals signs reviewed  Constitutional:       General: He is not in acute distress  Appearance: Normal appearance  He is obese  He is not ill-appearing, toxic-appearing or diaphoretic  HENT:      Head: Normocephalic and atraumatic  Nose: No congestion  Eyes:      General:         Right eye: No discharge  Left eye: No discharge  Extraocular Movements: Extraocular movements intact  Pupils: Pupils are equal, round, and reactive to light  Neck:      Musculoskeletal: Normal range of motion and neck supple  No neck rigidity or muscular tenderness  Vascular: No carotid bruit  Cardiovascular:      Rate and Rhythm: Normal rate and regular rhythm  Pulses: Normal pulses  Heart sounds: No murmur     Pulmonary:      Effort: No respiratory distress  Abdominal:      General: Abdomen is flat  There is no distension  Palpations: Abdomen is soft  There is no mass  Tenderness: There is no abdominal tenderness  Musculoskeletal:         General: No swelling or tenderness  Right lower leg: No edema  Left lower leg: No edema  Lymphadenopathy:      Cervical: No cervical adenopathy  Skin:     General: Skin is warm and dry  Capillary Refill: Capillary refill takes less than 2 seconds  Neurological:      General: No focal deficit present  Mental Status: He is alert and oriented to person, place, and time  The history was obtained from the review of the chart, patient  Lab Results:   Results from last 7 days   Lab Units 04/14/21 2139   HEMOGLOBIN A1C % 9 2*     Lab Results   Component Value Date    WBC 9 57 04/16/2021    HGB 10 7 (L) 04/16/2021    HCT 35 6 (L) 04/16/2021    MCV 81 (L) 04/16/2021     04/16/2021     Lab Results   Component Value Date/Time    BUN 34 (H) 04/16/2021 06:29 AM    BUN 17 09/09/2015 05:30 AM     09/09/2015 05:30 AM    K 4 8 04/16/2021 06:29 AM    K 4 2 09/09/2015 05:30 AM    CL 96 (L) 04/16/2021 06:29 AM     09/09/2015 05:30 AM    CO2 37 (H) 04/16/2021 06:29 AM    CO2 27 06/14/2019 08:47 PM    CREATININE 1 29 04/16/2021 06:29 AM    CREATININE 1 03 09/09/2015 05:30 AM    AST 23 04/15/2021 04:42 AM    AST 19 09/08/2015 02:00 PM    ALT 15 04/15/2021 04:42 AM    ALT 27 09/08/2015 02:00 PM    ALB 2 9 (L) 04/15/2021 04:42 AM    ALB 3 3 (L) 09/08/2015 02:00 PM     No results for input(s): CHOL, HDL, LDL, TRIG, VLDL in the last 72 hours    No results found for: Mack Ortiz  POC Glucose (mg/dl)   Date Value   04/16/2021 225 (H)   04/16/2021 290 (H)   04/16/2021 332 (H)   04/15/2021 336 (H)   04/15/2021 331 (H)   04/15/2021 279 (H)   04/15/2021 283 (H)   04/15/2021 244 (H)   04/15/2021 319 (H)   04/15/2021 491 (H)       Imaging Studies: I have personally reviewed pertinent reports  Portions of the record may have been created with voice recognition software

## 2021-04-17 ENCOUNTER — DOCUMENTATION (OUTPATIENT)
Dept: ICU | Facility: HOSPITAL | Age: 76
End: 2021-04-17

## 2021-04-17 LAB
ANION GAP SERPL CALCULATED.3IONS-SCNC: 3 MMOL/L (ref 4–13)
BACTERIA SPT RESP CULT: ABNORMAL
BACTERIA SPT RESP CULT: ABNORMAL
BUN SERPL-MCNC: 40 MG/DL (ref 5–25)
CALCIUM SERPL-MCNC: 8.6 MG/DL (ref 8.3–10.1)
CHLORIDE SERPL-SCNC: 94 MMOL/L (ref 100–108)
CO2 SERPL-SCNC: 37 MMOL/L (ref 21–32)
CREAT SERPL-MCNC: 1.23 MG/DL (ref 0.6–1.3)
GFR SERPL CREATININE-BSD FRML MDRD: 58 ML/MIN/1.73SQ M
GLUCOSE SERPL-MCNC: 220 MG/DL (ref 65–140)
GLUCOSE SERPL-MCNC: 224 MG/DL (ref 65–140)
GLUCOSE SERPL-MCNC: 251 MG/DL (ref 65–140)
GLUCOSE SERPL-MCNC: 297 MG/DL (ref 65–140)
GLUCOSE SERPL-MCNC: 63 MG/DL (ref 65–140)
GLUCOSE SERPL-MCNC: 89 MG/DL (ref 65–140)
GRAM STN SPEC: ABNORMAL
POTASSIUM SERPL-SCNC: 4.5 MMOL/L (ref 3.5–5.3)
SODIUM SERPL-SCNC: 134 MMOL/L (ref 136–145)

## 2021-04-17 PROCEDURE — 94760 N-INVAS EAR/PLS OXIMETRY 1: CPT

## 2021-04-17 PROCEDURE — 99232 SBSQ HOSP IP/OBS MODERATE 35: CPT | Performed by: NURSE PRACTITIONER

## 2021-04-17 PROCEDURE — 80048 BASIC METABOLIC PNL TOTAL CA: CPT | Performed by: PHYSICIAN ASSISTANT

## 2021-04-17 PROCEDURE — 94640 AIRWAY INHALATION TREATMENT: CPT

## 2021-04-17 PROCEDURE — 82948 REAGENT STRIP/BLOOD GLUCOSE: CPT

## 2021-04-17 RX ADMIN — CYCLOBENZAPRINE HYDROCHLORIDE 10 MG: 10 TABLET, FILM COATED ORAL at 17:07

## 2021-04-17 RX ADMIN — TAMSULOSIN HYDROCHLORIDE 0.4 MG: 0.4 CAPSULE ORAL at 17:08

## 2021-04-17 RX ADMIN — IPRATROPIUM BROMIDE 0.5 MG: 0.5 SOLUTION RESPIRATORY (INHALATION) at 13:24

## 2021-04-17 RX ADMIN — INSULIN LISPRO 12 UNITS: 100 INJECTION, SOLUTION INTRAVENOUS; SUBCUTANEOUS at 11:46

## 2021-04-17 RX ADMIN — OMEGA-3 FATTY ACIDS CAP 1000 MG 1000 MG: 1000 CAP at 10:19

## 2021-04-17 RX ADMIN — IPRATROPIUM BROMIDE 0.5 MG: 0.5 SOLUTION RESPIRATORY (INHALATION) at 19:50

## 2021-04-17 RX ADMIN — METOPROLOL TARTRATE 25 MG: 25 TABLET, FILM COATED ORAL at 17:07

## 2021-04-17 RX ADMIN — BUDESONIDE 0.5 MG: 0.5 INHALANT ORAL at 19:50

## 2021-04-17 RX ADMIN — CYCLOBENZAPRINE HYDROCHLORIDE 10 MG: 10 TABLET, FILM COATED ORAL at 21:02

## 2021-04-17 RX ADMIN — LEVALBUTEROL HYDROCHLORIDE 1.25 MG: 1.25 SOLUTION, CONCENTRATE RESPIRATORY (INHALATION) at 19:50

## 2021-04-17 RX ADMIN — CLOTRIMAZOLE: 0.01 CREAM TOPICAL at 10:20

## 2021-04-17 RX ADMIN — FERROUS SULFATE TAB 325 MG (65 MG ELEMENTAL FE) 325 MG: 325 (65 FE) TAB at 10:19

## 2021-04-17 RX ADMIN — LEVALBUTEROL HYDROCHLORIDE 1.25 MG: 1.25 SOLUTION, CONCENTRATE RESPIRATORY (INHALATION) at 13:23

## 2021-04-17 RX ADMIN — GUAIFENESIN 600 MG: 600 TABLET, EXTENDED RELEASE ORAL at 10:19

## 2021-04-17 RX ADMIN — ASPIRIN 81 MG: 81 TABLET, COATED ORAL at 10:19

## 2021-04-17 RX ADMIN — BUDESONIDE 0.5 MG: 0.5 INHALANT ORAL at 07:11

## 2021-04-17 RX ADMIN — CYCLOBENZAPRINE HYDROCHLORIDE 10 MG: 10 TABLET, FILM COATED ORAL at 10:19

## 2021-04-17 RX ADMIN — INSULIN HUMAN 50 UNITS: 500 INJECTION, SOLUTION SUBCUTANEOUS at 11:46

## 2021-04-17 RX ADMIN — FUROSEMIDE 40 MG: 40 TABLET ORAL at 10:18

## 2021-04-17 RX ADMIN — PRAVASTATIN SODIUM 80 MG: 80 TABLET ORAL at 17:08

## 2021-04-17 RX ADMIN — INSULIN LISPRO 8 UNITS: 100 INJECTION, SOLUTION INTRAVENOUS; SUBCUTANEOUS at 17:30

## 2021-04-17 RX ADMIN — PANTOPRAZOLE SODIUM 40 MG: 40 TABLET, DELAYED RELEASE ORAL at 10:19

## 2021-04-17 RX ADMIN — METOPROLOL TARTRATE 25 MG: 25 TABLET, FILM COATED ORAL at 10:19

## 2021-04-17 RX ADMIN — IPRATROPIUM BROMIDE 0.5 MG: 0.5 SOLUTION RESPIRATORY (INHALATION) at 07:11

## 2021-04-17 RX ADMIN — OXYCODONE HYDROCHLORIDE AND ACETAMINOPHEN 1.5 TABLET: 5; 325 TABLET ORAL at 17:14

## 2021-04-17 RX ADMIN — LEVALBUTEROL HYDROCHLORIDE 1.25 MG: 1.25 SOLUTION, CONCENTRATE RESPIRATORY (INHALATION) at 07:12

## 2021-04-17 RX ADMIN — PREDNISONE 40 MG: 20 TABLET ORAL at 10:49

## 2021-04-17 RX ADMIN — OMEGA-3 FATTY ACIDS CAP 1000 MG 1000 MG: 1000 CAP at 17:07

## 2021-04-17 RX ADMIN — PANTOPRAZOLE SODIUM 40 MG: 40 TABLET, DELAYED RELEASE ORAL at 05:18

## 2021-04-17 RX ADMIN — GUAIFENESIN 600 MG: 600 TABLET, EXTENDED RELEASE ORAL at 17:08

## 2021-04-17 RX ADMIN — ENOXAPARIN SODIUM 40 MG: 40 INJECTION SUBCUTANEOUS at 10:19

## 2021-04-17 RX ADMIN — INSULIN HUMAN 45 UNITS: 500 INJECTION, SOLUTION SUBCUTANEOUS at 17:30

## 2021-04-17 NOTE — PROGRESS NOTES
[address] [facility]  Progress Note - Hay Schaffer Sr  1945, 70 y o  male MRN: [id number]  Unit/Bed#: [facility] 314-01 Encounter: [id number]  Primary Care Provider: Shirley Vega MD   Date and time admitted to hospital: 4/14/2021  3:19 PM    * COPD, severe ([address] )  Assessment & Plan  · CTA chest 4/14- Moderate right pleural effusion  Mild compressive atelectasis in the right lung   Few airspace opacities in the upper lung could represent mild aspiration or pneumonia  · procal x1 negative   · Pulmonary consult  · Continue nebs  · Respiratory protocol  · Continue intravenous steroid treatment:  Solu-Medrol 40 mg, weaned from Q8 to BID 4/16 transitioned to po today    · Mucinex 600 mg q 12     · C/w home o2 at [facility]    Chronic diastolic heart failure (HCC)  Assessment & Plan  Wt Readings from Last 3 Encounters:   04/16/21 112 kg (247 lb 9 2 oz)   03/12/21 127 kg (279 lb)   11/20/20 122 kg (270 lb)     · Most likely current difficulty with breathing is secondary to COPD exacerbation plan as the accumulation of moderate right pleural effusion in conjunction with adeno carcinoma  · C/w lasix 40mg BID, however pt refusing bid states he only takes it daily    · Follows outpatient with Dr Constance Prather     Hyperkalemia  Kacy Michel  · Resolved     Chronic respiratory failure with hypoxia [facility])  Assessment & Plan  · In the setting of severe COPD   · C/w 3LNC which he is on at home     Primary adenocarcinoma of upper lobe of right lung [facility])  Assessment & Plan  Pulmonary consult placed     DDD (degenerative disc disease), lumbar  Assessment & Plan  · PDMP reviewed   · Last RX for oxy 5mg filled on 7/31/2020    Essential hypertension  Assessment & Plan  Continue metoprolol tartrate 25 mg twice daily  On Lasix 40 mg twice daily      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  Assessment & Plan  · Due to right lung adenocarcinoma evidenced by recurrent pleural effusions requiring pulmonary consult and thoracentesis  · CTA chest 4/14- Moderate right pleural effusion  · Has had thoracentesis in past   · Pulm following, s/p thoracentesis on 4/15 with 1 7 L removed  · F/u cytology     Chronic kidney disease, stage 3 [facility])  Assessment & Plan  Lab Results   Component Value Date    EGFR 55 04/16/2021    EGFR 50 04/15/2021    EGFR 43 04/15/2021    CREATININE 1 29 04/16/2021    CREATININE 1 38 (H) 04/15/2021    CREATININE 1 58 (H) 04/15/2021   · creat baseline appears to be 1 2-1 5 range   · Currently at baseline 1 23  · Was placed on bid diuretic which pt is refusing at this time feels he doesn't need it   · Also complaining of frequency no burning and bladder scan does not reveal retention    Type 2 diabetes mellitus with hyperglycemia, with long-term current use of insulin [facility])  Assessment & Plan  Lab Results   Component Value Date    HGBA1C 9 2 (H) 04/14/2021       Recent Labs     04/15/21  2122 04/15/21  2347 04/16/21  0636 04/16/21  1053   POCGLU 331* 336* 332* 290*       Blood Sugar Average: Last 72 hrs:  (P) 322 [id number]     · Uncontrolled hyperglycemia  · continue Humulin U 45 units twice daily with sliding scale and Accu-Cheks per protocol  increased to algo 5  · Monitor glucose closely with steroids , transitioned to po prednisone today monitor on steroid   · Consult endocrinology - appreciate input     Dyslipidemia  Assessment & Plan  Currently on Pravachol 80 mg daily  Obesity (BMI 30-39  9)  Assessment & Plan  Patient would need nutritional support and advise regarding weight loss as out patient  BMI 37        VTE Pharmacologic Prophylaxis:   High Risk (Score >/= 5) - Pharmacological DVT Prophylaxis Ordered: enoxaparin (Lovenox)  Sequential Compression Devices Ordered  Patient Centered Rounds: I performed bedside rounds with nursing staff today    Discussions with Specialists or Other Care Team Provider: nursing at bedside     Education and Discussions with Family / Patient: Patient declined call to   Time Spent for Care: 60 minutes  More than 50% of total time spent on counseling and coordination of care as described above  Current Length of Stay: 3 day(s)  Current Patient Status: Inpatient   Certification Statement: The patient will continue to require additional inpatient hospital stay due to ongoing uncontrolled diabetes   Discharge Plan: Anticipate discharge tomorrow to home  Code Status: Level 1 - Full Code    Subjective:   Pt reports his breathing has improved and that he is not coughing up mucus as before but does report frequency with urination and that he is wetting himself  He has no burning and bladder scan reveals no retention will continue to monitor at this time  Denies constipation at this time     Objective:     Vitals:   Temp (24hrs), Av 8 °F (36 6 °C), Min:97 5 °F (36 4 °C), Max:98 2 °F (36 8 °C)    Temp:  [97 5 °F (36 4 °C)-98 2 °F (36 8 °C)] 97 6 °F (36 4 °C)  HR:  [64-85] 65  Resp:  [18-19] 19  BP: (119-140)/(58-63) 132/58  SpO2:  [93 %-97 %] 97 %  Body mass index is 32 26 kg/m²  Input and Output Summary (last 24 hours): Intake/Output Summary (Last 24 hours) at 2021 1121  Last data filed at 2021 0900  Gross per 24 hour   Intake 660 ml   Output --   Net 660 ml       Physical Exam:   Physical Exam  Constitutional:       General: He is not in acute distress  Appearance: He is obese  He is not ill-appearing, toxic-appearing or diaphoretic  HENT:      Head: Normocephalic and atraumatic  Nose: No congestion  Eyes:      Conjunctiva/sclera: Conjunctivae normal    Cardiovascular:      Rate and Rhythm: Normal rate  Heart sounds: No murmur  No friction rub  No gallop  Pulmonary:      Comments: Very diminished bl bases some sob on 3liters nc   Abdominal:      General: Abdomen is flat  Bowel sounds are normal  There is no distension  Palpations: Abdomen is soft  There is no mass  Tenderness:  There is no abdominal tenderness  There is no right CVA tenderness, guarding or rebound  Hernia: No hernia is present  Musculoskeletal:         General: No swelling, tenderness, deformity or signs of injury  Right lower leg: No edema  Left lower leg: No edema  Skin:     Coloration: Skin is not jaundiced or pale  Findings: No bruising, erythema, lesion or rash  Neurological:      Mental Status: Mental status is at baseline  Psychiatric:         Behavior: Behavior normal           Additional Data:     Labs:  Results from last 7 days   Lab Units 04/16/21  0629 04/15/21  0441 04/14/21  1541   WBC Thousand/uL 9 57 7 41 8 44   HEMOGLOBIN g/dL 10 7* 10 6* 11 3*   HEMATOCRIT % 35 6* 34 7* 37 7   PLATELETS Thousands/uL 155 166 147*   NEUTROS PCT %  --   --  83*   LYMPHS PCT %  --   --  9*   LYMPHO PCT %  --  2*  --    MONOS PCT %  --   --  6   MONO PCT %  --  1*  --    EOS PCT %  --  2 1     Results from last 7 days   Lab Units 04/17/21  0516  04/15/21  0442   SODIUM mmol/L 134*   < > 132*   POTASSIUM mmol/L 4 5   < > 5 4*   CHLORIDE mmol/L 94*   < > 94*   CO2 mmol/L 37*   < > 32   BUN mg/dL 40*   < > 24   CREATININE mg/dL 1 23   < > 1 58*   ANION GAP mmol/L 3*   < > 6   CALCIUM mg/dL 8 6   < > 8 8   ALBUMIN g/dL  --   --  2 9*   TOTAL BILIRUBIN mg/dL  --   --  0 34   ALK PHOS U/L  --   --  80   ALT U/L  --   --  15   AST U/L  --   --  23   GLUCOSE RANDOM mg/dL 220*   < > 448*    < > = values in this interval not displayed       Results from last 7 days   Lab Units 04/15/21  0442   INR  1 01     Results from last 7 days   Lab Units 04/17/21  0615 04/16/21  2120 04/16/21  1627 04/16/21  1218 04/16/21  1053 04/16/21  0636 04/15/21  2347 04/15/21  2122 04/15/21  1651 04/15/21  1416 04/15/21  1133 04/15/21  0735   POC GLUCOSE mg/dl 251* 185* 211* 225* 290* 332* 336* 331* 279* 283* 244* 319*     Results from last 7 days   Lab Units 04/14/21  2139   HEMOGLOBIN A1C % 9 2*     Results from last 7 days   Lab Units 04/16/21  7741 04/15/21  0442   PROCALCITONIN ng/ml 0 05 0 08       Lines/Drains:  Invasive Devices     Peripheral Intravenous Line            Peripheral IV 04/14/21 Left Antecubital 2 days    Peripheral IV 04/14/21 Right Hand 2 days                      Imaging: Personally reviewed the following imaging: chest CT scan    Recent Cultures (last 7 days):   Results from last 7 days   Lab Units 04/15/21  1010 04/15/21  0453   SPUTUM CULTURE   --  3+ Growth of   Commensal respiratory melyssa only; No significant growth of Staph aureus/MRSA or Pseudomonas aeruginosa     GRAM STAIN RESULT  1+ Polys  No bacteria seen 1+ Epithelial cells per low power field*  No Polys*  2+ Gram positive cocci in pairs*  1+ Gram negative rods*   BODY FLUID CULTURE, STERILE  No growth  --        Last 24 Hours Medication List:   Current Facility-Administered Medications   Medication Dose Route Frequency Provider Last Rate    acetaminophen  650 mg Oral Q6H PRN Génesis Spears MD      aluminum-magnesium hydroxide-simethicone  30 mL Oral Q6H PRN Génesis Spears MD      aspirin  81 mg Oral Daily Génesis Spears MD      budesonide  0 5 mg Nebulization Q12H Lamont Argueta MD      clotrimazole   Topical BID Génesis Spears MD      cyclobenzaprine  10 mg Oral TID Génesis Spears MD      docusate sodium  100 mg Oral BID PRN Génesis Spears MD      enoxaparin  40 mg Subcutaneous Daily Génesis Spears MD      ferrous sulfate  325 mg Oral Daily With Breakfast Génesis Speras MD      fish oil  1,000 mg Oral BID Génesis Spears MD      furosemide  40 mg Oral BID Génesis Spears MD      guaiFENesin  600 mg Oral BID Génesis Spears MD      insulin lispro  4-20 Units Subcutaneous TID CHELSEY Saenz MD      insulin regular  45 Units Subcutaneous Before Dinner Jf Blanchard MD      insulin regular  50 Units Subcutaneous BID before breakfast/lunch Nicholas Webster MD      ipratropium  0 5 mg Nebulization TID Génesis Spears MD      levalbuterol 1 25 mg Nebulization Q6H PRN Porsche Parrish MD      And    sodium chloride  3 mL Nebulization Q6H PRN Porsche Parrish MD      levalbuterol  1 25 mg Nebulization TID Porsche Parrish MD      metoprolol tartrate  25 mg Oral BID Porsche Parrish MD      ondansetron  4 mg Intravenous Q6H PRN Porsche Parrish MD      oxyCODONE-acetaminophen  1 5 tablet Oral Q6H PRN Porsche Parrish MD      pantoprazole  40 mg Oral Daily Before Breakfast Porsche Parrish MD      pravastatin  80 mg Oral Daily With Zeinab Shelton MD      predniSONE  40 mg Oral Daily Pineda Levi MD      Followed by   Jules Assumption ON 4/20/2021] predniSONE  30 mg Oral Daily Pineda Levi MD      Followed by   Jules Assumption ON 4/23/2021] predniSONE  20 mg Oral Daily Pineda Levi MD      Followed by   Jules Assumption ON 4/26/2021] predniSONE  10 mg Oral Daily Pineda Levi MD      tamsulosin  0 4 mg Oral Daily With Zeinab Shelton MD          Today, Patient Was Seen By: YOLANDE Castanon    **Please Note: This note may have been constructed using a voice recognition system  **

## 2021-04-17 NOTE — QUICK NOTE
Patient was not seen or examined personally  Chart was reviewed  80-year-old male with type 2 diabetes on long-term insulin therapy with U 500 45 units with breakfast and lunch and 35 units before dinner  Recent past A1c was 9 3%  He is currently admitted with COPD exacerbation and is receiving IV steroids  Overnight blood glucose has ranged between 180 and 300 mg/dL  Yesterday his insulin regimen was increased to  50 units with breakfast, 50 units with lunch and 45 units with dinner  He will start this regimen today  Will increase the correction to algorithm 5 to correspond to his total insulin requirement  Monitor fingerstick glucose over night and titrate tomorrow

## 2021-04-18 LAB
ALBUMIN SERPL BCP-MCNC: 3 G/DL (ref 3.5–5)
ALP SERPL-CCNC: 64 U/L (ref 46–116)
ALT SERPL W P-5'-P-CCNC: 33 U/L (ref 12–78)
ANION GAP SERPL CALCULATED.3IONS-SCNC: 4 MMOL/L (ref 4–13)
AST SERPL W P-5'-P-CCNC: 38 U/L (ref 5–45)
BACTERIA SPEC BFLD CULT: NO GROWTH
BASOPHILS # BLD AUTO: 0.02 THOUSANDS/ΜL (ref 0–0.1)
BASOPHILS NFR BLD AUTO: 0 % (ref 0–1)
BILIRUB SERPL-MCNC: 1 MG/DL (ref 0.2–1)
BUN SERPL-MCNC: 41 MG/DL (ref 5–25)
CALCIUM ALBUM COR SERPL-MCNC: 9 MG/DL (ref 8.3–10.1)
CALCIUM SERPL-MCNC: 8.2 MG/DL (ref 8.3–10.1)
CHLORIDE SERPL-SCNC: 94 MMOL/L (ref 100–108)
CO2 SERPL-SCNC: 38 MMOL/L (ref 21–32)
CREAT SERPL-MCNC: 1.3 MG/DL (ref 0.6–1.3)
EOSINOPHIL # BLD AUTO: 0.03 THOUSAND/ΜL (ref 0–0.61)
EOSINOPHIL NFR BLD AUTO: 0 % (ref 0–6)
ERYTHROCYTE [DISTWIDTH] IN BLOOD BY AUTOMATED COUNT: 16.7 % (ref 11.6–15.1)
GFR SERPL CREATININE-BSD FRML MDRD: 54 ML/MIN/1.73SQ M
GLUCOSE SERPL-MCNC: 111 MG/DL (ref 65–140)
GLUCOSE SERPL-MCNC: 117 MG/DL (ref 65–140)
GLUCOSE SERPL-MCNC: 149 MG/DL (ref 65–140)
GLUCOSE SERPL-MCNC: 152 MG/DL (ref 65–140)
GLUCOSE SERPL-MCNC: 240 MG/DL (ref 65–140)
GRAM STN SPEC: NORMAL
GRAM STN SPEC: NORMAL
HCT VFR BLD AUTO: 37.3 % (ref 36.5–49.3)
HGB BLD-MCNC: 11.1 G/DL (ref 12–17)
IMM GRANULOCYTES # BLD AUTO: 0.04 THOUSAND/UL (ref 0–0.2)
IMM GRANULOCYTES NFR BLD AUTO: 1 % (ref 0–2)
LYMPHOCYTES # BLD AUTO: 0.89 THOUSANDS/ΜL (ref 0.6–4.47)
LYMPHOCYTES NFR BLD AUTO: 12 % (ref 14–44)
MCH RBC QN AUTO: 24.7 PG (ref 26.8–34.3)
MCHC RBC AUTO-ENTMCNC: 29.8 G/DL (ref 31.4–37.4)
MCV RBC AUTO: 83 FL (ref 82–98)
MONOCYTES # BLD AUTO: 0.74 THOUSAND/ΜL (ref 0.17–1.22)
MONOCYTES NFR BLD AUTO: 10 % (ref 4–12)
NEUTROPHILS # BLD AUTO: 5.51 THOUSANDS/ΜL (ref 1.85–7.62)
NEUTS SEG NFR BLD AUTO: 77 % (ref 43–75)
NRBC BLD AUTO-RTO: 0 /100 WBCS
PLATELET # BLD AUTO: 133 THOUSANDS/UL (ref 149–390)
PMV BLD AUTO: 10.1 FL (ref 8.9–12.7)
POTASSIUM SERPL-SCNC: 3.2 MMOL/L (ref 3.5–5.3)
PROT SERPL-MCNC: 7 G/DL (ref 6.4–8.2)
RBC # BLD AUTO: 4.5 MILLION/UL (ref 3.88–5.62)
SODIUM SERPL-SCNC: 136 MMOL/L (ref 136–145)
WBC # BLD AUTO: 7.23 THOUSAND/UL (ref 4.31–10.16)

## 2021-04-18 PROCEDURE — 80053 COMPREHEN METABOLIC PANEL: CPT | Performed by: NURSE PRACTITIONER

## 2021-04-18 PROCEDURE — 82948 REAGENT STRIP/BLOOD GLUCOSE: CPT

## 2021-04-18 PROCEDURE — 94760 N-INVAS EAR/PLS OXIMETRY 1: CPT

## 2021-04-18 PROCEDURE — 85025 COMPLETE CBC W/AUTO DIFF WBC: CPT | Performed by: NURSE PRACTITIONER

## 2021-04-18 PROCEDURE — 99232 SBSQ HOSP IP/OBS MODERATE 35: CPT | Performed by: INTERNAL MEDICINE

## 2021-04-18 PROCEDURE — 94640 AIRWAY INHALATION TREATMENT: CPT

## 2021-04-18 PROCEDURE — 99232 SBSQ HOSP IP/OBS MODERATE 35: CPT | Performed by: NURSE PRACTITIONER

## 2021-04-18 RX ORDER — POTASSIUM CHLORIDE 20 MEQ/1
40 TABLET, EXTENDED RELEASE ORAL ONCE
Status: COMPLETED | OUTPATIENT
Start: 2021-04-18 | End: 2021-04-18

## 2021-04-18 RX ORDER — MECLIZINE HYDROCHLORIDE 25 MG/1
25 TABLET ORAL ONCE
Status: COMPLETED | OUTPATIENT
Start: 2021-04-18 | End: 2021-04-18

## 2021-04-18 RX ADMIN — GUAIFENESIN 600 MG: 600 TABLET, EXTENDED RELEASE ORAL at 08:31

## 2021-04-18 RX ADMIN — ASPIRIN 81 MG: 81 TABLET, COATED ORAL at 08:30

## 2021-04-18 RX ADMIN — PREDNISONE 40 MG: 20 TABLET ORAL at 08:32

## 2021-04-18 RX ADMIN — INSULIN HUMAN 45 UNITS: 500 INJECTION, SOLUTION SUBCUTANEOUS at 16:43

## 2021-04-18 RX ADMIN — METOPROLOL TARTRATE 25 MG: 25 TABLET, FILM COATED ORAL at 08:31

## 2021-04-18 RX ADMIN — INSULIN LISPRO 8 UNITS: 100 INJECTION, SOLUTION INTRAVENOUS; SUBCUTANEOUS at 11:52

## 2021-04-18 RX ADMIN — LEVALBUTEROL HYDROCHLORIDE 1.25 MG: 1.25 SOLUTION, CONCENTRATE RESPIRATORY (INHALATION) at 19:24

## 2021-04-18 RX ADMIN — PANTOPRAZOLE SODIUM 40 MG: 40 TABLET, DELAYED RELEASE ORAL at 06:45

## 2021-04-18 RX ADMIN — PRAVASTATIN SODIUM 80 MG: 80 TABLET ORAL at 16:43

## 2021-04-18 RX ADMIN — METOPROLOL TARTRATE 25 MG: 25 TABLET, FILM COATED ORAL at 17:55

## 2021-04-18 RX ADMIN — ENOXAPARIN SODIUM 40 MG: 40 INJECTION SUBCUTANEOUS at 08:31

## 2021-04-18 RX ADMIN — IPRATROPIUM BROMIDE 0.5 MG: 0.5 SOLUTION RESPIRATORY (INHALATION) at 19:24

## 2021-04-18 RX ADMIN — OMEGA-3 FATTY ACIDS CAP 1000 MG 1000 MG: 1000 CAP at 17:55

## 2021-04-18 RX ADMIN — IPRATROPIUM BROMIDE 0.5 MG: 0.5 SOLUTION RESPIRATORY (INHALATION) at 07:55

## 2021-04-18 RX ADMIN — CYCLOBENZAPRINE HYDROCHLORIDE 10 MG: 10 TABLET, FILM COATED ORAL at 23:12

## 2021-04-18 RX ADMIN — POTASSIUM CHLORIDE 40 MEQ: 1500 TABLET, EXTENDED RELEASE ORAL at 10:29

## 2021-04-18 RX ADMIN — FERROUS SULFATE TAB 325 MG (65 MG ELEMENTAL FE) 325 MG: 325 (65 FE) TAB at 08:31

## 2021-04-18 RX ADMIN — INSULIN HUMAN 50 UNITS: 500 INJECTION, SOLUTION SUBCUTANEOUS at 08:42

## 2021-04-18 RX ADMIN — OXYCODONE HYDROCHLORIDE AND ACETAMINOPHEN 1.5 TABLET: 5; 325 TABLET ORAL at 08:39

## 2021-04-18 RX ADMIN — BUDESONIDE 0.5 MG: 0.5 INHALANT ORAL at 19:24

## 2021-04-18 RX ADMIN — CYCLOBENZAPRINE HYDROCHLORIDE 10 MG: 10 TABLET, FILM COATED ORAL at 16:37

## 2021-04-18 RX ADMIN — OMEGA-3 FATTY ACIDS CAP 1000 MG 1000 MG: 1000 CAP at 08:31

## 2021-04-18 RX ADMIN — FUROSEMIDE 40 MG: 40 TABLET ORAL at 08:31

## 2021-04-18 RX ADMIN — BUDESONIDE 0.5 MG: 0.5 INHALANT ORAL at 07:55

## 2021-04-18 RX ADMIN — GUAIFENESIN 600 MG: 600 TABLET, EXTENDED RELEASE ORAL at 17:55

## 2021-04-18 RX ADMIN — MECLIZINE HYDROCHLORIDE 25 MG: 25 TABLET ORAL at 16:37

## 2021-04-18 RX ADMIN — INSULIN HUMAN 50 UNITS: 500 INJECTION, SOLUTION SUBCUTANEOUS at 11:55

## 2021-04-18 RX ADMIN — LEVALBUTEROL HYDROCHLORIDE 1.25 MG: 1.25 SOLUTION, CONCENTRATE RESPIRATORY (INHALATION) at 13:34

## 2021-04-18 RX ADMIN — INSULIN LISPRO 4 UNITS: 100 INJECTION, SOLUTION INTRAVENOUS; SUBCUTANEOUS at 16:37

## 2021-04-18 RX ADMIN — TAMSULOSIN HYDROCHLORIDE 0.4 MG: 0.4 CAPSULE ORAL at 16:43

## 2021-04-18 RX ADMIN — IPRATROPIUM BROMIDE 0.5 MG: 0.5 SOLUTION RESPIRATORY (INHALATION) at 13:34

## 2021-04-18 RX ADMIN — LEVALBUTEROL HYDROCHLORIDE 1.25 MG: 1.25 SOLUTION, CONCENTRATE RESPIRATORY (INHALATION) at 07:56

## 2021-04-18 RX ADMIN — CYCLOBENZAPRINE HYDROCHLORIDE 10 MG: 10 TABLET, FILM COATED ORAL at 08:32

## 2021-04-18 NOTE — NURSING NOTE
Pt was brought morning medication and given pills in med cup at bedside  Pt asked if his furosemide was one of the pills in the cup, it was confirmed that this was present and that the medication was ordered twice a day while in hospital  Pt expressed that he "should have gotten it earlier" and "is not going to take it now, and I don't need to take it later in the evening too - I only take it once at home"  Pt advised that they increased frequency of medication due to high likelihood of having extra fluid overload after discussing case with YOLANDE Mina who had just left bedside  Pt advised that he should take the medication as ordered due to his dyspnea on exertion  Pt states "I'm am not taking it again until tomorrow morning"  Vicente Bernal notified of the patient refusing medication via TT  It was explained to the patient that refusing this medication could potentially extend his hospital stay and have negative consequences regarding his treatment plan  Patient competent and verbally confirms he understands these risks at this time

## 2021-04-18 NOTE — PLAN OF CARE
Problem: Potential for Falls  Goal: Patient will remain free of falls  Description: INTERVENTIONS:  - Assess patient frequently for physical needs  -  Identify cognitive and physical deficits and behaviors that affect risk of falls    -  [place] fall precautions as indicated by assessment   - Educate patient/family on patient safety including physical limitations  - Instruct patient to call for assistance with activity based on assessment  - Modify environment to reduce risk of injury  - Consider OT/PT consult to assist with strengthening/mobility  4/18/2021 0750 by Robert Boswell RN  Outcome: Progressing  4/18/2021 0749 by Robert Boswell RN  Outcome: Progressing     Problem: PAIN - ADULT  Goal: Verbalizes/displays adequate comfort level or baseline comfort level  Description: Interventions:  - Encourage patient to monitor pain and request assistance  - Assess pain using appropriate pain scale  - Administer analgesics based on type and severity of pain and evaluate response  - Implement non-pharmacological measures as appropriate and evaluate response  - Consider cultural and social influences on pain and pain management  - Notify physician/advanced practitioner if interventions unsuccessful or patient reports new pain  4/18/2021 0750 by Robert Boswell RN  Outcome: Progressing  4/18/2021 0749 by Robert Boswell RN  Outcome: Progressing     Problem: INFECTION - ADULT  Goal: Absence or prevention of progression during hospitalization  Description: INTERVENTIONS:  - Assess and monitor for signs and symptoms of infection  - Monitor lab/diagnostic results  - Monitor all insertion sites, i e  indwelling lines, tubes, and drains  - Monitor endotracheal if appropriate and nasal secretions for changes in amount and color  - [place] appropriate cooling/warming therapies per order  - Administer medications as ordered  - Instruct and encourage patient and family to use good hand hygiene technique  - Identify and instruct in appropriate isolation precautions for identified infection/condition  4/18/2021 0750 by Afia Arroyo RN  Outcome: Progressing  4/18/2021 0749 by Afia Arroyo RN  Outcome: Progressing  Goal: Absence of fever/infection during neutropenic period  Description: INTERVENTIONS:  - Monitor WBC    4/18/2021 0750 by Afia Arroyo RN  Outcome: Progressing  4/18/2021 0749 by Afia Arroyo RN  Outcome: Progressing     Problem: SAFETY ADULT  Goal: Patient will remain free of falls  Description: INTERVENTIONS:  - Assess patient frequently for physical needs  -  Identify cognitive and physical deficits and behaviors that affect risk of falls    -  [place] fall precautions as indicated by assessment   - Educate patient/family on patient safety including physical limitations  - Instruct patient to call for assistance with activity based on assessment  - Modify environment to reduce risk of injury  - Consider OT/PT consult to assist with strengthening/mobility  4/18/2021 0750 by Afia Arroyo RN  Outcome: Progressing  4/18/2021 0749 by Afia Arroyo RN  Outcome: Progressing  Goal: Maintain or return to baseline ADL function  Description: INTERVENTIONS:  -  Assess patient's ability to carry out ADLs; assess patient's baseline for ADL function and identify physical deficits which impact ability to perform ADLs (bathing, care of mouth/teeth, toileting, grooming, dressing, etc )  - Assess/evaluate cause of self-care deficits   - Assess range of motion  - Assess patient's mobility; develop plan if impaired  - Assess patient's need for assistive devices and provide as appropriate  - Encourage maximum independence but intervene and supervise when necessary  - Involve family in performance of ADLs  - Assess for home care needs following discharge   - Consider OT consult to assist with ADL evaluation and planning for discharge  - Provide patient education as appropriate  4/18/2021 0750 by Afia Arroyo RN  Outcome: Progressing  4/18/2021 0749 by Scottie Diaz RN  Outcome: Progressing  Goal: Maintain or return mobility status to optimal level  Description: INTERVENTIONS:  - Assess patient's baseline mobility status (ambulation, transfers, stairs, etc )    - Identify cognitive and physical deficits and behaviors that affect mobility  - Identify mobility aids required to assist with transfers and/or ambulation (gait belt, sit-to-stand, lift, walker, cane, etc )  - [place] fall precautions as indicated by assessment  - Record patient progress and toleration of activity level on Mobility SBAR; progress patient to next Phase/Stage  - Instruct patient to call for assistance with activity based on assessment  - Consider rehabilitation consult to assist with strengthening/weightbearing, etc   4/18/2021 0750 by Scottie Diaz RN  Outcome: Progressing  4/18/2021 0749 by Scottie Diaz RN  Outcome: Progressing     Problem: DISCHARGE PLANNING  Goal: Discharge to home or other facility with appropriate resources  Description: INTERVENTIONS:  - Identify barriers to discharge w/patient and caregiver  - Arrange for needed discharge resources and transportation as appropriate  - Identify discharge learning needs (meds, wound care, etc )  - Arrange for interpretive services to assist at discharge as needed  - Refer to Case Management Department for coordinating discharge planning if the patient needs post-hospital services based on physician/advanced practitioner order or complex needs related to functional status, cognitive ability, or social support system  4/18/2021 0750 by Scottie Diaz RN  Outcome: Progressing  4/18/2021 0749 by Scottie Diaz RN  Outcome: Progressing     Problem: Knowledge Deficit  Goal: Patient/family/caregiver demonstrates understanding of disease process, treatment plan, medications, and discharge instructions  Description: Complete learning assessment and assess knowledge base   Interventions:  - Provide teaching at level of understanding  - Provide teaching via preferred learning methods  4/18/2021 0750 by Varinder Roger, RN  Outcome: Progressing  4/18/2021 0749 by Varinder Roger, RN  Outcome: Progressing

## 2021-04-18 NOTE — ASSESSMENT & PLAN NOTE
Wt Readings from Last 3 Encounters:   04/18/21 111 kg (245 lb)   03/12/21 127 kg (279 lb)   11/20/20 122 kg (270 lb)     · Most likely current difficulty with breathing is secondary to COPD exacerbation plan as the accumulation of moderate right pleural effusion in conjunction with adeno carcinoma  · C/w lasix 40mg BID, however pt refusing bid states he only takes it daily  - extensive discussion had with patient today in regards to why we had increased to b i d , he feels like he understands now however it is causing him to be super incontinent of urine  Discussed condom cath however patient does not feel that it would work since does not feel it will stay on    · Follows outpatient with Dr Morillo Found

## 2021-04-18 NOTE — PLAN OF CARE
Problem: Potential for Falls  Goal: Patient will remain free of falls  Description: INTERVENTIONS:  - Assess patient frequently for physical needs  -  Identify cognitive and physical deficits and behaviors that affect risk of falls    -  [place] fall precautions as indicated by assessment   - Educate patient/family on patient safety including physical limitations  - Instruct patient to call for assistance with activity based on assessment  - Modify environment to reduce risk of injury  - Consider OT/PT consult to assist with strengthening/mobility  Outcome: Progressing     Problem: PAIN - ADULT  Goal: Verbalizes/displays adequate comfort level or baseline comfort level  Description: Interventions:  - Encourage patient to monitor pain and request assistance  - Assess pain using appropriate pain scale  - Administer analgesics based on type and severity of pain and evaluate response  - Implement non-pharmacological measures as appropriate and evaluate response  - Consider cultural and social influences on pain and pain management  - Notify physician/advanced practitioner if interventions unsuccessful or patient reports new pain  Outcome: Progressing     Problem: INFECTION - ADULT  Goal: Absence or prevention of progression during hospitalization  Description: INTERVENTIONS:  - Assess and monitor for signs and symptoms of infection  - Monitor lab/diagnostic results  - Monitor all insertion sites, i e  indwelling lines, tubes, and drains  - Monitor endotracheal if appropriate and nasal secretions for changes in amount and color  - [place] appropriate cooling/warming therapies per order  - Administer medications as ordered  - Instruct and encourage patient and family to use good hand hygiene technique  - Identify and instruct in appropriate isolation precautions for identified infection/condition  Outcome: Progressing  Goal: Absence of fever/infection during neutropenic period  Description: INTERVENTIONS:  - Monitor WBC    Outcome: Progressing     Problem: SAFETY ADULT  Goal: Patient will remain free of falls  Description: INTERVENTIONS:  - Assess patient frequently for physical needs  -  Identify cognitive and physical deficits and behaviors that affect risk of falls    -  [place] fall precautions as indicated by assessment   - Educate patient/family on patient safety including physical limitations  - Instruct patient to call for assistance with activity based on assessment  - Modify environment to reduce risk of injury  - Consider OT/PT consult to assist with strengthening/mobility  Outcome: Progressing  Goal: Maintain or return to baseline ADL function  Description: INTERVENTIONS:  -  Assess patient's ability to carry out ADLs; assess patient's baseline for ADL function and identify physical deficits which impact ability to perform ADLs (bathing, care of mouth/teeth, toileting, grooming, dressing, etc )  - Assess/evaluate cause of self-care deficits   - Assess range of motion  - Assess patient's mobility; develop plan if impaired  - Assess patient's need for assistive devices and provide as appropriate  - Encourage maximum independence but intervene and supervise when necessary  - Involve family in performance of ADLs  - Assess for home care needs following discharge   - Consider OT consult to assist with ADL evaluation and planning for discharge  - Provide patient education as appropriate  Outcome: Progressing  Goal: Maintain or return mobility status to optimal level  Description: INTERVENTIONS:  - Assess patient's baseline mobility status (ambulation, transfers, stairs, etc )    - Identify cognitive and physical deficits and behaviors that affect mobility  - Identify mobility aids required to assist with transfers and/or ambulation (gait belt, sit-to-stand, lift, walker, cane, etc )  - [place] fall precautions as indicated by assessment  - Record patient progress and toleration of activity level on Mobility SBAR; progress patient to next Phase/Stage  - Instruct patient to call for assistance with activity based on assessment  - Consider rehabilitation consult to assist with strengthening/weightbearing, etc   Outcome: Progressing     Problem: DISCHARGE PLANNING  Goal: Discharge to home or other facility with appropriate resources  Description: INTERVENTIONS:  - Identify barriers to discharge w/patient and caregiver  - Arrange for needed discharge resources and transportation as appropriate  - Identify discharge learning needs (meds, wound care, etc )  - Arrange for interpretive services to assist at discharge as needed  - Refer to Case Management Department for coordinating discharge planning if the patient needs post-hospital services based on physician/advanced practitioner order or complex needs related to functional status, cognitive ability, or social support system  Outcome: Progressing     Problem: Knowledge Deficit  Goal: Patient/family/caregiver demonstrates understanding of disease process, treatment plan, medications, and discharge instructions  Description: Complete learning assessment and assess knowledge base    Interventions:  - Provide teaching at level of understanding  - Provide teaching via preferred learning methods  Outcome: Progressing

## 2021-04-18 NOTE — ASSESSMENT & PLAN NOTE
· CTA chest 4/14- Moderate right pleural effusion  Mild compressive atelectasis in the right lung   Few airspace opacities in the upper lung could represent mild aspiration or pneumonia  · procal x1 negative   · Pulmonary consult  · Continue nebs  · Respiratory protocol    · Continue intravenous steroid treatment:  Solu-Medrol 40 mg, weaned from Q8 to BID 4/16 transitioned to po today    · Mucinex 600 mg q 12     · C/w home o2 at [facility]

## 2021-04-18 NOTE — PROGRESS NOTES
Inpatient follow-up progress note        Assessment:  60-year-old male with type 2 diabetes on long-term insulin therapy with U 500 45 units with breakfast and lunch and 35 units before dinner  Recent past A1c was 9 3%  He is currently admitted with COPD exacerbation and is receiving IV steroids  Plan:  1  Type 2 diabetes with hyperglycemia and hypoglycemia  Baseline A1c was 9 2%  His home regimen is  45 units with breakfast, 45 units with lunch and 35 units with dinner  Note EGFR of 54 which is near baseline  Overnight, he had some hyperglycemia postprandially  His probably eating different from at home  Will change to  45 units with breakfast and lunch and 35 units with dinner  Continue correctional insulin with algorithm 5     2  Chronic heart failure and COPD exacerbation  Primary service is following  He is now on oral prednisone  S:  Reported hypoglycemia with symptoms yesterday  Otherwise no complaints  O:  Patient seen and examined personally  /76 (BP Location: Right arm)   Pulse 91   Temp 97 7 °F (36 5 °C) (Oral)   Resp 20   Wt 111 kg (245 lb)   SpO2 97%   BMI 33 23 kg/m²     Physical Exam  Vitals signs reviewed  HENT:      Head: Normocephalic  Eyes:      Pupils: Pupils are equal, round, and reactive to light  Musculoskeletal:         General: No deformity  Neurological:      Mental Status: He is alert and oriented to person, place, and time           Current Facility-Administered Medications   Medication Dose Route Frequency Provider Last Rate    acetaminophen  650 mg Oral Q6H PRN Cele Herron MD      aluminum-magnesium hydroxide-simethicone  30 mL Oral Q6H PRN Cele Herron MD      aspirin  81 mg Oral Daily Cele Herron MD      budesonide  0 5 mg Nebulization Q12H Riri Mosher MD      clotrimazole   Topical BID Cele Herron MD      cyclobenzaprine  10 mg Oral TID Cele Herron MD      docusate sodium  100 mg Oral BID PRN Davey Parham MD      enoxaparin  40 mg Subcutaneous Daily Davey Parham MD      ferrous sulfate  325 mg Oral Daily With Breakfast Davey Parham MD      fish oil  1,000 mg Oral BID Davey Parham MD      furosemide  40 mg Oral BID Davey Parham MD      guaiFENesin  600 mg Oral BID Davey Parham MD      insulin lispro  4-20 Units Subcutaneous TID AC Benny Martins MD      insulin regular  45 Units Subcutaneous Before Don Essex, MD      insulin regular  50 Units Subcutaneous BID before breakfast/lunch Tano Treadwell MD      ipratropium  0 5 mg Nebulization TID Davey Parham MD      levalbuterol  1 25 mg Nebulization Q6H PRN Davey Parham MD      And    sodium chloride  3 mL Nebulization Q6H PRN Davey Parham MD      levalbuterol  1 25 mg Nebulization TID Davey Parham MD      meclizine  25 mg Oral Once [facility], CRNP      metoprolol tartrate  25 mg Oral BID Davey Parham MD      ondansetron  4 mg Intravenous Q6H PRN Davey Parham MD      oxyCODONE-acetaminophen  1 5 tablet Oral Q6H PRN Davey Parham MD      pantoprazole  40 mg Oral Daily Before Breakfast Davey Parham MD      pravastatin  80 mg Oral Daily With Karli Purdy MD      predniSONE  40 mg Oral Daily Oriana Kim MD      Followed by   Yvonne Goodman ON 4/20/2021] predniSONE  30 mg Oral Daily Oriana Kim MD      Followed by   Yvonne Goodman ON 4/23/2021] predniSONE  20 mg Oral Daily Oriana Kim MD      Followed by   Yvonne Goodman ON 4/26/2021] predniSONE  10 mg Oral Daily Oriana Kim MD      tamsulosin  0 4 mg Oral Daily With Karli Purdy MD          Lab, Imaging and other studies:   POC Glucose (mg/dl)   Date Value   04/18/2021 111   04/17/2021 89   04/17/2021 63 (L)   04/17/2021 224 (H)   04/17/2021 297 (H)   04/17/2021 251 (H)   04/16/2021 185 (H)   04/16/2021 211 (H)   04/16/2021 225 (H)   04/16/2021 290 (H)

## 2021-04-18 NOTE — ASSESSMENT & PLAN NOTE
Lab Results   Component Value Date    HGBA1C 9 2 (H) 04/14/2021       Recent Labs     04/17/21 2054 04/17/21  2148 04/18/21  0618 04/18/21  1133   POCGLU 63* 89 111 240*       Blood Sugar Average: Last 72 hrs:  (P) 288 [id number]     · Uncontrolled hyperglycemia  · continue Humulin U 45 units twice daily with sliding scale and Accu-Cheks per protocol  increased to algo 5 per endocrinology   · Monitor glucose closely with steroids , transitioned to po prednisone today monitor on steroid   · Consult endocrinology - appreciate input   · Pt reports feeling very light headed and he states he does have vertigo thinks this is why   I agreed to give dose of meclizine however do feel that blood sugar was low this am and likely played a role in how he felt continue to monitor

## 2021-04-18 NOTE — ASSESSMENT & PLAN NOTE
· Patient would need nutritional support and advise regarding weight loss as out patient    · BMI 37

## 2021-04-18 NOTE — QUICK NOTE
Reached out to endocrinology about hypoglycemia this am and reports he will reduce dosing to pts home dose   Continue to monitor

## 2021-04-18 NOTE — PROGRESS NOTES
[address] [facility]  Progress Note - Norma Pulling Sr  1945, 70 y o  male MRN: [id number]  Unit/Bed#: [facility] 314-01 Encounter: [id number]  Primary Care Provider: Wilfred Bishop MD   Date and time admitted to hospital: 4/14/2021  3:19 PM    * COPD, severe ([address] )  Assessment & Plan  · CTA chest 4/14- Moderate right pleural effusion  Mild compressive atelectasis in the right lung   Few airspace opacities in the upper lung could represent mild aspiration or pneumonia  · procal x1 negative   · Pulmonary consult  · Continue nebs  · Respiratory protocol  · Continue intravenous steroid treatment:  Solu-Medrol 40 mg, weaned from Q8 to BID 4/16 transitioned to po today    · Mucinex 600 mg q 12     · C/w home o2 at [facility]    Chronic diastolic heart failure (HCC)  Assessment & Plan  Wt Readings from Last 3 Encounters:   04/18/21 111 kg (245 lb)   03/12/21 127 kg (279 lb)   11/20/20 122 kg (270 lb)     · Most likely current difficulty with breathing is secondary to COPD exacerbation plan as the accumulation of moderate right pleural effusion in conjunction with adeno carcinoma  · C/w lasix 40mg BID, however pt refusing bid states he only takes it daily  - extensive discussion had with patient today in regards to why we had increased to b i d , he feels like he understands now however it is causing him to be super incontinent of urine  Discussed condom cath however patient does not feel that it would work since does not feel it will stay on    · Follows outpatient with Dr Olayinka Le     Hyperkalemia  [address] [place] [address]  · Repleted    Chronic respiratory failure with hypoxia [facility])  Assessment & Plan  · In the setting of severe COPD   · C/w 3LNC which he is on at home     Primary adenocarcinoma of upper lobe of right lung [facility])  Assessment & Plan  Pulmonary consult placed     DDD (degenerative disc disease), lumbar  Assessment & Plan  · PDMP reviewed   · Last RX for oxy 5mg filled on 7/31/2020    Essential hypertension  Assessment & Plan  Continue metoprolol tartrate 25 mg twice daily  On Lasix 40 mg twice daily  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  Assessment & Plan  · Due to right lung adenocarcinoma evidenced by recurrent pleural effusions requiring pulmonary consult and thoracentesis  · CTA chest 4/14- Moderate right pleural effusion  · Has had thoracentesis in past   · Pulm following, s/p thoracentesis on 4/15 with 1 7 L removed  · F/u cytology     Chronic kidney disease, stage 3 [facility])  Assessment & Plan  Lab Results   Component Value Date    EGFR 54 04/18/2021    EGFR 58 04/17/2021    EGFR 55 04/16/2021    CREATININE 1 30 04/18/2021    CREATININE 1 23 04/17/2021    CREATININE 1 29 04/16/2021   · creat baseline appears to be 1 2-1 5 range   · Currently at baseline 1 23  · Was placed on bid diuretic which pt is refusing at this time feels he doesn't need it   · Also complaining of frequency no burning and bladder scan does not reveal retention    Type 2 diabetes mellitus with hyperglycemia, with long-term current use of insulin [facility])  Assessment & Plan  Lab Results   Component Value Date    HGBA1C 9 2 (H) 04/14/2021       Recent Labs     04/17/21  2054 04/17/21  2148 04/18/21  0618 04/18/21  1133   POCGLU 63* 89 111 240*       Blood Sugar Average: Last 72 hrs:  (P) 536 [id number]     · Uncontrolled hyperglycemia  · continue Humulin U 45 units twice daily with sliding scale and Accu-Cheks per protocol  increased to algo 5 per endocrinology   · Monitor glucose closely with steroids , transitioned to po prednisone today monitor on steroid   · Consult endocrinology - appreciate input   · Pt reports feeling very light headed and he states he does have vertigo thinks this is why   I agreed to give dose of meclizine however do feel that blood sugar was low this am and likely played a role in how he felt continue to monitor     Dyslipidemia  Assessment & Plan  Currently on Pravachol 80 mg daily     Obesity (BMI 30-39  9)  Assessment & Plan  · Patient would need nutritional support and advise regarding weight loss as out patient  · BMI 37        VTE Pharmacologic Prophylaxis:   High Risk (Score >/= 5) - Pharmacological DVT Prophylaxis Ordered: enoxaparin (Lovenox)  Sequential Compression Devices Ordered  Patient Centered Rounds: I performed bedside rounds with nursing staff today  Discussions with Specialists or Other Care Team Provider:  Nursing and endocrinology    Education and Discussions with Family / Patient: Patient declined call to   Time Spent for Care: 60 minutes  More than 50% of total time spent on counseling and coordination of care as described above  Current Length of Stay: 4 day(s)  Current Patient Status: Inpatient   Certification Statement: The patient will continue to require additional inpatient hospital stay due to Ongoing need for improved blood sugar control  Discharge Plan: Anticipate discharge in 48-72 hrs to home  Code Status: Level 1 - Full Code    Subjective:   Extensive conversation had with patient in terms of Him refusing his diuretic  He reports that he has been soaking himself and incontinent due to his inability to control urination  Patient did have bladder scan which was negative for retention  He still had great difficulty understanding why he had been placed on b i d  Diuretic  He states that he was initially put on his diuretic for his lower extremities  We discussed general fluid retention  He is agreeable although he feels that he will continue to struggle  He does not think that a condom cath with fit or stay on  He states that he did feel very lightheaded dizzy reports that he has history of vertigo however blood sugar this morning was 63 and low he states it has never been that low before  Discussed giving 1 dose of meclizine, for now      Objective:     Vitals:   Temp (24hrs), Av 6 °F (36 4 °C), Min:97 4 °F (36 3 °C), Max:97 7 °F (36 5 °C)    Temp:  [97 4 °F (36 3 °C)-97 7 °F (36 5 °C)] 97 7 °F (36 5 °C)  HR:  [73-91] 91  Resp:  [18-20] 20  BP: (127-151)/(57-76) 151/76  SpO2:  [95 %-97 %] 97 %  Body mass index is 33 23 kg/m²  Input and Output Summary (last 24 hours): Intake/Output Summary (Last 24 hours) at 4/18/2021 1222  Last data filed at 4/18/2021 1135  Gross per 24 hour   Intake 180 ml   Output 1900 ml   Net -1720 ml       Physical Exam:   Physical Exam  Constitutional:       General: He is not in acute distress  Appearance: He is obese  He is not ill-appearing, toxic-appearing or diaphoretic  HENT:      Head: Normocephalic and atraumatic  Nose: No congestion  Eyes:      General:         Right eye: No discharge  Left eye: No discharge  Conjunctiva/sclera: Conjunctivae normal    Cardiovascular:      Rate and Rhythm: Normal rate  Heart sounds: No murmur  No friction rub  No gallop  Pulmonary:      Effort: No respiratory distress  Breath sounds: No stridor  No wheezing, rhonchi or rales  Comments: Very poor effort and diminished to bilateral bases  Chest:      Chest wall: No tenderness  Abdominal:      General: There is no distension  Palpations: There is no mass  Tenderness: There is no abdominal tenderness  There is no right CVA tenderness, left CVA tenderness, guarding or rebound  Hernia: No hernia is present  Musculoskeletal:         General: No swelling, tenderness, deformity or signs of injury  Right lower leg: No edema  Left lower leg: No edema  Skin:     Coloration: Skin is not jaundiced or pale  Findings: No bruising, erythema, lesion or rash  Neurological:      Mental Status: He is alert and oriented to person, place, and time     Psychiatric:         Behavior: Behavior normal           Additional Data:     Labs:  Results from last 7 days   Lab Units 04/18/21  0447   WBC Thousand/uL 7 23   HEMOGLOBIN g/dL 11 1*   HEMATOCRIT % 37 3 PLATELETS Thousands/uL 133*   NEUTROS PCT % 77*   LYMPHS PCT % 12*   MONOS PCT % 10   EOS PCT % 0     Results from last 7 days   Lab Units 04/18/21  0447   SODIUM mmol/L 136   POTASSIUM mmol/L 3 2*   CHLORIDE mmol/L 94*   CO2 mmol/L 38*   BUN mg/dL 41*   CREATININE mg/dL 1 30   ANION GAP mmol/L 4   CALCIUM mg/dL 8 2*   ALBUMIN g/dL 3 0*   TOTAL BILIRUBIN mg/dL 1 00   ALK PHOS U/L 64   ALT U/L 33   AST U/L 38   GLUCOSE RANDOM mg/dL 117     Results from last 7 days   Lab Units 04/15/21  0442   INR  1 01     Results from last 7 days   Lab Units 04/18/21  1133 04/18/21  0618 04/17/21  2148 04/17/21  2054 04/17/21  1612 04/17/21  1121 04/17/21  0615 04/16/21  2120 04/16/21  1627 04/16/21  1218 04/16/21  1053 04/16/21  0636   POC GLUCOSE mg/dl 240* 111 89 63* 224* 297* 251* 185* 211* 225* 290* 332*     Results from last 7 days   Lab Units 04/14/21  2139   HEMOGLOBIN A1C % 9 2*     Results from last 7 days   Lab Units 04/16/21  0629 04/15/21  0442   PROCALCITONIN ng/ml 0 05 0 08       Lines/Drains:  Invasive Devices     Peripheral Intravenous Line            Peripheral IV 04/14/21 Left Antecubital 3 days                      Imaging: No pertinent imaging reviewed  Recent Cultures (last 7 days):   Results from last 7 days   Lab Units 04/15/21  1010 04/15/21  0453   SPUTUM CULTURE   --  3+ Growth of   Commensal respiratory melyssa only; No significant growth of Staph aureus/MRSA or Pseudomonas aeruginosa     GRAM STAIN RESULT  1+ Polys  No bacteria seen 1+ Epithelial cells per low power field*  No Polys*  2+ Gram positive cocci in pairs*  1+ Gram negative rods*   BODY FLUID CULTURE, STERILE  No growth  --        Last 24 Hours Medication List:   Current Facility-Administered Medications   Medication Dose Route Frequency Provider Last Rate    acetaminophen  650 mg Oral Q6H PRN Kwaku Bolden MD      aluminum-magnesium hydroxide-simethicone  30 mL Oral Q6H PRN Kwaku Bolden MD      aspirin  81 mg Oral Daily Fanta Berry MD      budesonide  0 5 mg Nebulization Q12H Afua Moon MD      clotrimazole   Topical BID Fanta Berry MD      cyclobenzaprine  10 mg Oral TID Fanta Berry MD      docusate sodium  100 mg Oral BID PRN Fanta Berry MD      enoxaparin  40 mg Subcutaneous Daily Fanta Berry MD      ferrous sulfate  325 mg Oral Daily With Breakfast Fanta Berry MD      fish oil  1,000 mg Oral BID Fanta Berry MD      furosemide  40 mg Oral BID Fanta Berry MD      guaiFENesin  600 mg Oral BID Fatna Berry MD      insulin lispro  4-20 Units Subcutaneous TID AC Giovanni Rai MD      insulin regular  45 Units Subcutaneous Before Alfredo Laureano MD      insulin regular  50 Units Subcutaneous BID before breakfast/lunch Kishore Bell MD      ipratropium  0 5 mg Nebulization TID Fanta Berry MD      levalbuterol  1 25 mg Nebulization Q6H PRN Fanta Berry MD      And    sodium chloride  3 mL Nebulization Q6H PRN Fanta Berry MD      levalbuterol  1 25 mg Nebulization TID Fanta Berry MD      meclizine  25 mg Oral Once YOLANDE Rondon      metoprolol tartrate  25 mg Oral BID Fanta Berry MD      ondansetron  4 mg Intravenous Q6H PRN Fanta Berry MD      oxyCODONE-acetaminophen  1 5 tablet Oral Q6H PRN Fanta Berry MD      pantoprazole  40 mg Oral Daily Before Breakfast Fanta Berry MD      pravastatin  80 mg Oral Daily With Mayda Khan MD      predniSONE  40 mg Oral Daily Afua Moon MD      Followed by   Karolina Jefferson ON 4/20/2021] predniSONE  30 mg Oral Daily Afua Moon MD      Followed by   Karolina Jefferson ON 4/23/2021] predniSONE  20 mg Oral Daily Afua Moon MD      Followed by   Karolina Jefferson ON 4/26/2021] predniSONE  10 mg Oral Daily Afua Moon MD      tamsulosin  0 4 mg Oral Daily With Mayda Khan MD          Today, Patient Was Seen By: YOLANDE Rondon    **Please Note: This note may have been constructed using a voice recognition system  **

## 2021-04-18 NOTE — ASSESSMENT & PLAN NOTE
Lab Results   Component Value Date    EGFR 54 04/18/2021    EGFR 58 04/17/2021    EGFR 55 04/16/2021    CREATININE 1 30 04/18/2021    CREATININE 1 23 04/17/2021    CREATININE 1 29 04/16/2021   · creat baseline appears to be 1 2-1 5 range   · Currently at baseline 1 23  · Was placed on bid diuretic which pt is refusing at this time feels he doesn't need it   · Also complaining of frequency no burning and bladder scan does not reveal retention

## 2021-04-18 NOTE — PLAN OF CARE
Problem: Potential for Falls  Goal: Patient will remain free of falls  Description: INTERVENTIONS:  - Assess patient frequently for physical needs  -  Identify cognitive and physical deficits and behaviors that affect risk of falls    -  Liberal fall precautions as indicated by assessment   - Educate patient/family on patient safety including physical limitations  - Instruct patient to call for assistance with activity based on assessment  - Modify environment to reduce risk of injury  - Consider OT/PT consult to assist with strengthening/mobility  Outcome: Progressing     Problem: PAIN - ADULT  Goal: Verbalizes/displays adequate comfort level or baseline comfort level  Description: Interventions:  - Encourage patient to monitor pain and request assistance  - Assess pain using appropriate pain scale  - Administer analgesics based on type and severity of pain and evaluate response  - Implement non-pharmacological measures as appropriate and evaluate response  - Consider cultural and social influences on pain and pain management  - Notify physician/advanced practitioner if interventions unsuccessful or patient reports new pain  Outcome: Progressing     Problem: INFECTION - ADULT  Goal: Absence or prevention of progression during hospitalization  Description: INTERVENTIONS:  - Assess and monitor for signs and symptoms of infection  - Monitor lab/diagnostic results  - Monitor all insertion sites, i e  indwelling lines, tubes, and drains  - Monitor endotracheal if appropriate and nasal secretions for changes in amount and color  - Liberal appropriate cooling/warming therapies per order  - Administer medications as ordered  - Instruct and encourage patient and family to use good hand hygiene technique  - Identify and instruct in appropriate isolation precautions for identified infection/condition  Outcome: Progressing  Goal: Absence of fever/infection during neutropenic period  Description: INTERVENTIONS:  - Monitor WBC    Outcome: Progressing     Problem: SAFETY ADULT  Goal: Patient will remain free of falls  Description: INTERVENTIONS:  - Assess patient frequently for physical needs  -  Identify cognitive and physical deficits and behaviors that affect risk of falls    -  Walker fall precautions as indicated by assessment   - Educate patient/family on patient safety including physical limitations  - Instruct patient to call for assistance with activity based on assessment  - Modify environment to reduce risk of injury  - Consider OT/PT consult to assist with strengthening/mobility  Outcome: Progressing  Goal: Maintain or return to baseline ADL function  Description: INTERVENTIONS:  -  Assess patient's ability to carry out ADLs; assess patient's baseline for ADL function and identify physical deficits which impact ability to perform ADLs (bathing, care of mouth/teeth, toileting, grooming, dressing, etc )  - Assess/evaluate cause of self-care deficits   - Assess range of motion  - Assess patient's mobility; develop plan if impaired  - Assess patient's need for assistive devices and provide as appropriate  - Encourage maximum independence but intervene and supervise when necessary  - Involve family in performance of ADLs  - Assess for home care needs following discharge   - Consider OT consult to assist with ADL evaluation and planning for discharge  - Provide patient education as appropriate  Outcome: Progressing  Goal: Maintain or return mobility status to optimal level  Description: INTERVENTIONS:  - Assess patient's baseline mobility status (ambulation, transfers, stairs, etc )    - Identify cognitive and physical deficits and behaviors that affect mobility  - Identify mobility aids required to assist with transfers and/or ambulation (gait belt, sit-to-stand, lift, walker, cane, etc )  - Walker fall precautions as indicated by assessment  - Record patient progress and toleration of activity level on Mobility SBAR; progress patient to next Phase/Stage  - Instruct patient to call for assistance with activity based on assessment  - Consider rehabilitation consult to assist with strengthening/weightbearing, etc   Outcome: Progressing     Problem: DISCHARGE PLANNING  Goal: Discharge to home or other facility with appropriate resources  Description: INTERVENTIONS:  - Identify barriers to discharge w/patient and caregiver  - Arrange for needed discharge resources and transportation as appropriate  - Identify discharge learning needs (meds, wound care, etc )  - Arrange for interpretive services to assist at discharge as needed  - Refer to Case Management Department for coordinating discharge planning if the patient needs post-hospital services based on physician/advanced practitioner order or complex needs related to functional status, cognitive ability, or social support system  Outcome: Progressing     Problem: Knowledge Deficit  Goal: Patient/family/caregiver demonstrates understanding of disease process, treatment plan, medications, and discharge instructions  Description: Complete learning assessment and assess knowledge base    Interventions:  - Provide teaching at level of understanding  - Provide teaching via preferred learning methods  Outcome: Progressing

## 2021-04-19 VITALS
TEMPERATURE: 98 F | HEART RATE: 72 BPM | BODY MASS INDEX: 32.35 KG/M2 | SYSTOLIC BLOOD PRESSURE: 153 MMHG | RESPIRATION RATE: 17 BRPM | DIASTOLIC BLOOD PRESSURE: 67 MMHG | WEIGHT: 238.54 LBS | OXYGEN SATURATION: 97 %

## 2021-04-19 LAB
ANION GAP SERPL CALCULATED.3IONS-SCNC: 1 MMOL/L (ref 4–13)
BUN SERPL-MCNC: 32 MG/DL (ref 5–25)
CALCIUM SERPL-MCNC: 8.3 MG/DL (ref 8.3–10.1)
CHLORIDE SERPL-SCNC: 97 MMOL/L (ref 100–108)
CO2 SERPL-SCNC: 39 MMOL/L (ref 21–32)
CREAT SERPL-MCNC: 1.19 MG/DL (ref 0.6–1.3)
GFR SERPL CREATININE-BSD FRML MDRD: 60 ML/MIN/1.73SQ M
GLUCOSE SERPL-MCNC: 112 MG/DL (ref 65–140)
GLUCOSE SERPL-MCNC: 143 MG/DL (ref 65–140)
GLUCOSE SERPL-MCNC: 298 MG/DL (ref 65–140)
POTASSIUM SERPL-SCNC: 4 MMOL/L (ref 3.5–5.3)
SODIUM SERPL-SCNC: 137 MMOL/L (ref 136–145)

## 2021-04-19 PROCEDURE — 99239 HOSP IP/OBS DSCHRG MGMT >30: CPT | Performed by: NURSE PRACTITIONER

## 2021-04-19 PROCEDURE — 82948 REAGENT STRIP/BLOOD GLUCOSE: CPT

## 2021-04-19 PROCEDURE — 94760 N-INVAS EAR/PLS OXIMETRY 1: CPT

## 2021-04-19 PROCEDURE — 80048 BASIC METABOLIC PNL TOTAL CA: CPT | Performed by: INTERNAL MEDICINE

## 2021-04-19 PROCEDURE — 94640 AIRWAY INHALATION TREATMENT: CPT

## 2021-04-19 RX ORDER — PREDNISONE 20 MG/1
20 TABLET ORAL DAILY
Qty: 3 TABLET | Refills: 0 | Status: SHIPPED | OUTPATIENT
Start: 2021-04-23 | End: 2021-04-26

## 2021-04-19 RX ORDER — PREDNISONE 10 MG/1
10 TABLET ORAL DAILY
Qty: 3 TABLET | Refills: 0 | Status: SHIPPED | OUTPATIENT
Start: 2021-04-26 | End: 2021-04-29

## 2021-04-19 RX ORDER — PREDNISONE 10 MG/1
30 TABLET ORAL DAILY
Qty: 9 TABLET | Refills: 0 | Status: SHIPPED | OUTPATIENT
Start: 2021-04-20 | End: 2021-04-23

## 2021-04-19 RX ADMIN — CYCLOBENZAPRINE HYDROCHLORIDE 10 MG: 10 TABLET, FILM COATED ORAL at 08:46

## 2021-04-19 RX ADMIN — BUDESONIDE 0.5 MG: 0.5 INHALANT ORAL at 08:23

## 2021-04-19 RX ADMIN — INSULIN LISPRO 12 UNITS: 100 INJECTION, SOLUTION INTRAVENOUS; SUBCUTANEOUS at 11:17

## 2021-04-19 RX ADMIN — OXYCODONE HYDROCHLORIDE AND ACETAMINOPHEN 1.5 TABLET: 5; 325 TABLET ORAL at 06:19

## 2021-04-19 RX ADMIN — INSULIN HUMAN 50 UNITS: 500 INJECTION, SOLUTION SUBCUTANEOUS at 08:46

## 2021-04-19 RX ADMIN — GUAIFENESIN 600 MG: 600 TABLET, EXTENDED RELEASE ORAL at 08:45

## 2021-04-19 RX ADMIN — FERROUS SULFATE TAB 325 MG (65 MG ELEMENTAL FE) 325 MG: 325 (65 FE) TAB at 08:44

## 2021-04-19 RX ADMIN — IPRATROPIUM BROMIDE 0.5 MG: 0.5 SOLUTION RESPIRATORY (INHALATION) at 08:23

## 2021-04-19 RX ADMIN — OMEGA-3 FATTY ACIDS CAP 1000 MG 1000 MG: 1000 CAP at 08:45

## 2021-04-19 RX ADMIN — LEVALBUTEROL HYDROCHLORIDE 1.25 MG: 1.25 SOLUTION, CONCENTRATE RESPIRATORY (INHALATION) at 08:23

## 2021-04-19 RX ADMIN — INSULIN HUMAN 50 UNITS: 500 INJECTION, SOLUTION SUBCUTANEOUS at 11:18

## 2021-04-19 RX ADMIN — METOPROLOL TARTRATE 25 MG: 25 TABLET, FILM COATED ORAL at 08:45

## 2021-04-19 RX ADMIN — ENOXAPARIN SODIUM 40 MG: 40 INJECTION SUBCUTANEOUS at 08:44

## 2021-04-19 RX ADMIN — PREDNISONE 40 MG: 20 TABLET ORAL at 08:46

## 2021-04-19 RX ADMIN — FUROSEMIDE 40 MG: 40 TABLET ORAL at 08:45

## 2021-04-19 RX ADMIN — PANTOPRAZOLE SODIUM 40 MG: 40 TABLET, DELAYED RELEASE ORAL at 06:12

## 2021-04-19 RX ADMIN — ASPIRIN 81 MG: 81 TABLET, COATED ORAL at 08:45

## 2021-04-19 NOTE — DISCHARGE SUMMARY
Discharge Summary - [facility]  Internal Medicine    Patient Information: Smita Palma  70 y o  male MRN: [id number]  Unit/Bed#: [facility] 314-01 Encounter: [id number]    Discharging Physician / Practitioner: YOLANDE Kulkarni  PCP: Nina Mata MD  Admission Date: 4/14/2021  Discharge Date: 04/19/21    Reason for Admission:  Mild acute COPD exacerbation, right recurrent pleural effusion    Discharge Diagnoses:     Principal Problem:    COPD, severe ([address] )  Active Problems:    Obesity (BMI 30-39  9)    Dyslipidemia    Type 2 diabetes mellitus with hyperglycemia, with long-term current use of insulin (HCC)    Chronic kidney disease, stage 3 (HCC)    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    Chronic diastolic heart failure (HCC)    Essential hypertension    DDD (degenerative disc disease), lumbar    Primary adenocarcinoma of upper lobe of right lung (HCC)    Chronic respiratory failure with hypoxia (HCC)    Hyperkalemia  Resolved Problems:    * No resolved hospital problems  *      Consultations During Hospital Stay:  · Pulmonology  · Endocrinology    Procedures Performed:     · Thoracentesis 4/15    Significant Findings / Test Results:     · Chest x-ray Right lung opacity corresponding with right upper lobe pneumonia, right base rounded atelectasis, and moderate effusion per comparison with chest CT   · CTA chest PE study Technically limited exam due to respiratory motion artifact  No evidence of proximal pulmonary artery embolus, thoracic aortic aneurysm or dissection  Moderate right pleural effusion  Mild compressive atelectasis in the right lung  Few airspace opacities in the upper lung could represent mild aspiration or pneumonia  · Chest x-ray Decreased right pleural effusion with persistent consolidation throughout the right lung and volume loss with shift of the mediastinum to the right  No discernible pneumothorax      Incidental Findings:   · None     Test Results Pending at Discharge (will require follow up):   · Pleural fluid cytology     Outpatient Tests Requested: Outpatient follow-up with PCP  Outpatient follow-up with Dr Ella Perez of pulmonology scheduled for 4/29  Outpatient f/u with Dr Drake Louise of cardiology scheduled for 4/22  Outpatient follow-up with primary endocrinologist    Complications:  None    Hospital Course:     Esdras Gupta  is a 70 y o  male patient with past medical history of severe COPD with chronic respiratory failure on 3 L nasal cannula, chronic diastolic congestive heart failure, primary dental carcinoma of the right lung, degenerative disc disease, hypertension, CKD stage 3, diabetes type 2, hyperlipidemia, obesity who originally presented to the hospital on 4/14/2021 due to shortness of breath  Patient was found to be in mild COPD exacerbation and was given intravenous steroids  He was also noted to have right recurrent pleural effusion for which he is status post thoracentesis  Cytology is pending however strong consideration that this is a malignant effusion  His respiratory status has improved and he is now at his baseline  Would ideally continue Lasix 40 mg p o  B i d  However patient is refusing to take this 2 times per day secondary to urinary incontinence  Discussed with patient  Stable for discharge home today to complete p o  Prednisone taper  Discussed with endocrinology, recommendations to continue home U 500 insulin regimen  He already has appointment scheduled to follow-up with Cardiology 4/22 and pulmonology 4/29  Condition at Discharge: stable     Discharge Day Visit / Exam:     Subjective:  Patient offers no acute complaints  He is refusing to take Lasix at b i d  Dosing secondary to urinary incontinence  Feels that breathing is at baseline, denies shortness of breath  No dizziness or lightheadedness today      Vitals: Blood Pressure: 153/67 (04/19/21 0700)  Pulse: 72 (04/19/21 0700)  Temperature: 98 °F (36 7 °C) (04/19/21 0700)  Temp Source: Oral (04/19/21 0700)  Respirations: 17 (04/19/21 0700)  Weight - Scale: 108 kg (238 lb 8 6 oz) (04/19/21 0400)  SpO2: 97 % (04/19/21 0823)     Exam:   Physical Exam  Vitals signs and nursing note reviewed  Constitutional:       Appearance: He is obese  Interventions: Nasal cannula in place  Cardiovascular:      Rate and Rhythm: Normal rate  Pulmonary:      Breath sounds: Decreased breath sounds present  Abdominal:      Tenderness: There is no abdominal tenderness  Musculoskeletal:         General: No swelling  Skin:     General: Skin is warm  Neurological:      Mental Status: He is alert and oriented to person, place, and time  Mental status is at baseline  Psychiatric:         Mood and Affect: Mood normal          Discussion with Family:  Patient  Refused family update  Discharge instructions/Information to patient and family:   See after visit summary for information provided to patient and family  Provisions for Follow-Up Care:  See after visit summary for information related to follow-up care and any pertinent home health orders  Disposition:     Home    For Discharges to [facility] SNF:   · Not Applicable to this Patient - Not Applicable to this Patient    Planned Readmission: no     Discharge Statement:  I spent 40 minutes discharging the patient  This time was spent on the day of discharge  I had direct contact with the patient on the day of discharge  Greater than 50% of the total time was spent examining patient, answering all patient questions, arranging and discussing plan of care with patient as well as directly providing post-discharge instructions  Additional time then spent on discharge activities  Discharge Medications:  See after visit summary for reconciled discharge medications provided to patient and family        ** Please Note: This note has been constructed using a voice recognition system **

## 2021-04-19 NOTE — PLAN OF CARE
Problem: Potential for Falls  Goal: Patient will remain free of falls  Description: INTERVENTIONS:  - Assess patient frequently for physical needs  -  Identify cognitive and physical deficits and behaviors that affect risk of falls    -  [place] fall precautions as indicated by assessment   - Educate patient/family on patient safety including physical limitations  - Instruct patient to call for assistance with activity based on assessment  - Modify environment to reduce risk of injury  - Consider OT/PT consult to assist with strengthening/mobility  Outcome: Progressing     Problem: PAIN - ADULT  Goal: Verbalizes/displays adequate comfort level or baseline comfort level  Description: Interventions:  - Encourage patient to monitor pain and request assistance  - Assess pain using appropriate pain scale  - Administer analgesics based on type and severity of pain and evaluate response  - Implement non-pharmacological measures as appropriate and evaluate response  - Consider cultural and social influences on pain and pain management  - Notify physician/advanced practitioner if interventions unsuccessful or patient reports new pain  Outcome: Progressing     Problem: INFECTION - ADULT  Goal: Absence or prevention of progression during hospitalization  Description: INTERVENTIONS:  - Assess and monitor for signs and symptoms of infection  - Monitor lab/diagnostic results  - Monitor all insertion sites, i e  indwelling lines, tubes, and drains  - Monitor endotracheal if appropriate and nasal secretions for changes in amount and color  - Sylvester appropriate cooling/warming therapies per order  - Administer medications as ordered  - Instruct and encourage patient and family to use good hand hygiene technique  - Identify and instruct in appropriate isolation precautions for identified infection/condition  Outcome: Progressing  Goal: Absence of fever/infection during neutropenic period  Description: INTERVENTIONS:  - Monitor WBC    Outcome: Progressing     Problem: SAFETY ADULT  Goal: Patient will remain free of falls  Description: INTERVENTIONS:  - Assess patient frequently for physical needs  -  Identify cognitive and physical deficits and behaviors that affect risk of falls    -  [place] fall precautions as indicated by assessment   - Educate patient/family on patient safety including physical limitations  - Instruct patient to call for assistance with activity based on assessment  - Modify environment to reduce risk of injury  - Consider OT/PT consult to assist with strengthening/mobility  Outcome: Progressing  Goal: Maintain or return to baseline ADL function  Description: INTERVENTIONS:  -  Assess patient's ability to carry out ADLs; assess patient's baseline for ADL function and identify physical deficits which impact ability to perform ADLs (bathing, care of mouth/teeth, toileting, grooming, dressing, etc )  - Assess/evaluate cause of self-care deficits   - Assess range of motion  - Assess patient's mobility; develop plan if impaired  - Assess patient's need for assistive devices and provide as appropriate  - Encourage maximum independence but intervene and supervise when necessary  - Involve family in performance of ADLs  - Assess for home care needs following discharge   - Consider OT consult to assist with ADL evaluation and planning for discharge  - Provide patient education as appropriate  Outcome: Progressing  Goal: Maintain or return mobility status to optimal level  Description: INTERVENTIONS:  - Assess patient's baseline mobility status (ambulation, transfers, stairs, etc )    - Identify cognitive and physical deficits and behaviors that affect mobility  - Identify mobility aids required to assist with transfers and/or ambulation (gait belt, sit-to-stand, lift, walker, cane, etc )  - [place] fall precautions as indicated by assessment  - Record patient progress and toleration of activity level on Mobility SBAR; progress patient to next Phase/Stage  - Instruct patient to call for assistance with activity based on assessment  - Consider rehabilitation consult to assist with strengthening/weightbearing, etc   Outcome: Progressing     Problem: DISCHARGE PLANNING  Goal: Discharge to home or other facility with appropriate resources  Description: INTERVENTIONS:  - Identify barriers to discharge w/patient and caregiver  - Arrange for needed discharge resources and transportation as appropriate  - Identify discharge learning needs (meds, wound care, etc )  - Arrange for interpretive services to assist at discharge as needed  - Refer to Case Management Department for coordinating discharge planning if the patient needs post-hospital services based on physician/advanced practitioner order or complex needs related to functional status, cognitive ability, or social support system  Outcome: Progressing     Problem: Knowledge Deficit  Goal: Patient/family/caregiver demonstrates understanding of disease process, treatment plan, medications, and discharge instructions  Description: Complete learning assessment and assess knowledge base    Interventions:  - Provide teaching at level of understanding  - Provide teaching via preferred learning methods  Outcome: Progressing

## 2021-04-19 NOTE — PLAN OF CARE
Problem: Potential for Falls  Goal: Patient will remain free of falls  Description: INTERVENTIONS:  - Assess patient frequently for physical needs  -  Identify cognitive and physical deficits and behaviors that affect risk of falls    -  [place] fall precautions as indicated by assessment   - Educate patient/family on patient safety including physical limitations  - Instruct patient to call for assistance with activity based on assessment  - Modify environment to reduce risk of injury  - Consider OT/PT consult to assist with strengthening/mobility  Outcome: Progressing

## 2021-04-20 ENCOUNTER — TRANSITIONAL CARE MANAGEMENT (OUTPATIENT)
Dept: FAMILY MEDICINE CLINIC | Facility: CLINIC | Age: 76
End: 2021-04-20

## 2021-04-21 NOTE — UTILIZATION REVIEW
Notification of Discharge   This is a Notification of Discharge from our facility [address]  Please be advised that this patient has been discharge from our facility  Below you will find the admission and discharge date and time including the patients disposition  UTILIZATION REVIEW CONTACT:  Gato Simon  Utilization   Network Utilization Review Department  Phone: [phone] x carefully listen to the prompts  All voicemails are confidential   Email: [email or web address]     PHYSICIAN ADVISORY SERVICES:  FOR [id number] REVIEW - MEDICAL NECESSITY DENIAL  Phone: [phone]  Fax: [phone]  Email: [email or web address]     PRESENTATION DATE: 4/14/2021  3:19 PM  OBERVATION ADMISSION DATE:   INPATIENT ADMISSION DATE: 4/14/21 2049   DISCHARGE DATE: 4/19/2021  1:24 PM  DISPOSITION: Home/Self Care Home/Self Care      IMPORTANT INFORMATION:  Send all requests for admission clinical reviews, approved or denied determinations and any other requests to dedicated fax number below belonging to the campus where the patient is receiving treatment   List of dedicated fax numbers:  [address] DENIALS (Administrative/Medical Necessity) [phone]   [address] (Maternity/NICU/Pediatrics) [phone]   Maribel Jackson [phone]   Maximo Dobbs [phone]   Timmy Holm [phone]    [facility] [address] [place] [address] [phone]   [facility]  [phone]   [address] [facility] [address]  [address] [address] [facility] [phone]

## 2021-04-29 ENCOUNTER — OFFICE VISIT (OUTPATIENT)
Dept: PULMONOLOGY | Facility: CLINIC | Age: 76
End: 2021-04-29
Payer: COMMERCIAL

## 2021-04-29 ENCOUNTER — TRANSCRIBE ORDERS (OUTPATIENT)
Dept: ADMINISTRATIVE | Facility: HOSPITAL | Age: 76
End: 2021-04-29

## 2021-04-29 ENCOUNTER — HOSPITAL ENCOUNTER (OUTPATIENT)
Dept: RADIOLOGY | Facility: HOSPITAL | Age: 76
Discharge: HOME | End: 2021-04-29
Attending: INTERNAL MEDICINE
Payer: COMMERCIAL

## 2021-04-29 VITALS
DIASTOLIC BLOOD PRESSURE: 60 MMHG | SYSTOLIC BLOOD PRESSURE: 128 MMHG | HEIGHT: 72 IN | OXYGEN SATURATION: 98 % | RESPIRATION RATE: 18 BRPM | HEART RATE: 103 BPM | WEIGHT: 245 LBS | TEMPERATURE: 98.5 F | BODY MASS INDEX: 33.18 KG/M2

## 2021-04-29 DIAGNOSIS — J90 PLEURAL EFFUSION ON RIGHT: Primary | ICD-10-CM

## 2021-04-29 DIAGNOSIS — J44.9 COPD, SEVERE (HCC): ICD-10-CM

## 2021-04-29 DIAGNOSIS — J44.9 CHRONIC OBSTRUCTIVE PULMONARY DISEASE, UNSPECIFIED COPD TYPE (HCC): ICD-10-CM

## 2021-04-29 DIAGNOSIS — J90 PLEURAL EFFUSION ON RIGHT: ICD-10-CM

## 2021-04-29 DIAGNOSIS — J96.12 CHRONIC RESPIRATORY FAILURE WITH HYPOXIA AND HYPERCAPNIA (HCC): ICD-10-CM

## 2021-04-29 DIAGNOSIS — J96.11 CHRONIC RESPIRATORY FAILURE WITH HYPOXIA AND HYPERCAPNIA (HCC): ICD-10-CM

## 2021-04-29 DIAGNOSIS — C34.11 PRIMARY ADENOCARCINOMA OF UPPER LOBE OF RIGHT LUNG (HCC): ICD-10-CM

## 2021-04-29 DIAGNOSIS — G47.33 OSA (OBSTRUCTIVE SLEEP APNEA): ICD-10-CM

## 2021-04-29 PROCEDURE — 71046 X-RAY EXAM CHEST 2 VIEWS: CPT

## 2021-04-29 PROCEDURE — 99215 OFFICE O/P EST HI 40 MIN: CPT | Performed by: INTERNAL MEDICINE

## 2021-04-29 RX ORDER — FLUTICASONE FUROATE, UMECLIDINIUM BROMIDE AND VILANTEROL TRIFENATATE 100; 62.5; 25 UG/1; UG/1; UG/1
1 POWDER RESPIRATORY (INHALATION) DAILY
Qty: 60 EACH | Refills: 3 | Status: SHIPPED | OUTPATIENT
Start: 2021-04-29 | End: 2021-10-13

## 2021-04-29 NOTE — PROGRESS NOTES
Progress Note - Pulmonary   Mahad Ellis Sr  70 y o  male MRN: [id number]   Encounter: [id number]      Assessment/Plan:  Patient is a 82-year-old male with past medical history significant for lung adenocarcinoma, severe Chronic Obstructive Pulmonary Disease, recurrent right pleural effusion, chronic hypoxemic respiratory failure who presents for routine follow-up  The patient was recently admitted to the hospital for shortness of breath  At that time he was treated with steroids and underwent a thoracentesis which had a bloody, lymphocytic predominant effusion  The cytology the pleural fluid was negative  Since discharge, the patient reports his shortness of breath has increased  He felt good for approximately 2 days and then has slowly worsened since that time  Repeat chest x-ray was completed today which showed persistent loculated right-sided effusion  It is possible that his 3 current shortness of breath is more likely related to his severe Chronic Obstructive Pulmonary Disease with weaning of corticosteroids  For his history of lung adenocarcinoma, patient is scheduled to undergo a PET-CT scan in approximately 1 week  Will follow up with this for possible malignant effusion  If the pleural fluid is concerning, would consider referral to thoracic surgery for VATS decortication  For history of sleep apnea, patient reports he is intolerant of the mask  He has been sleeping with the supplemental oxygen at night  May continue this for now  For his history of severe Chronic Obstructive Pulmonary Disease, patient continues take Trelegy  He is not currently on corticosteroids  If his breathing worsens, may consider re-initiation of steroids  For history of diastolic heart failure and severe pulmonary hypertension, patient reports he is compliant his diuretics  He appears euvolemic at this time  Encouraged to continue take his diuretics as prescribed      Patient may follow up in 2-3 months or sooner as necessary  Orders:  Orders Placed This Encounter   Procedures    XR chest pa & lateral     Standing Status:   Future     Number of Occurrences:   1     Standing Expiration Date:   4/29/2025     Scheduling Instructions:      Bring along any outside films relating to this procedure  Subjective: The patient reports that he is having continuing periods of shortness of breath  He felt good at home for about 2 days  He notes he can walk about 30 feet before he feels short of breath  If he rests, his oxygen improves  He is using 3L NC at all times  He finds no benefit from his albuterol inhalers  He takes lasix for his lower extremity swelling  If he does not take the medication, he will notice swelling  He denies fevers, chills, nausea or vomiting  He is not tolerating the CPAP at night therefore he is only using the oxygen  He feels suffocated  He reports his diabetes is poorly controlled  He has had numbness of his fingers  Inhaler Regimen:  Trelegy  Nebulizer - 1-2x/day    Remainder of review of systems negative except as described in HPI  The following portions of the patient's history were reviewed and updated as appropriate: allergies, current medications, past family history, past medical history, past social history, past surgical history and problem list      Objective:   Vitals: Blood pressure 128/60, pulse 103, temperature 98 5 °F (36 9 °C), temperature source Tympanic, resp  rate 18, height 6' (1 829 m), weight 111 kg (245 lb), SpO2 98 %  , 3L NC, Body mass index is 33 23 kg/m²      Physical Exam  Gen: Pleasant, awake, alert, oriented x 3, no acute distress  HEENT: Mucous membranes moist, no oral lesions, no thrush  NECK: No accessory muscle use, JVP not elevated  Cardiac: RRR, single S1, single S2, no murmurs, no rubs, no gallops  Lungs: diminished breath sounds through out  Abdomen: normoactive bowel sounds, soft nontender, nondistended, no rebound or rigidity, no guarding; obese  Extremities: no cyanosis, no clubbing, no LE edema  MSK:  Strength equal in all extremities  Derm:  No rashes/lesions noted; multiple scars  Neuro:  Appropriate mood/affect    Labs: I have personally reviewed pertinent lab results  Lab Results   Component Value Date    WBC 7 23 04/18/2021    WBC 9 79 09/09/2015    HGB 11 1 (L) 04/18/2021    HGB 13 5 09/09/2015     (L) 04/18/2021     09/09/2015     Lab Results   Component Value Date    CREATININE 1 19 04/19/2021    CREATININE 1 03 09/09/2015        Imaging and other studies: I have personally reviewed pertinent films in PACS  CXR 4/29/21  My interpretation:  Loculated right sided pleural effusion    Pulmonary Function Testing: I have personally reviewed pertinent reports  and I have personally reviewed pertinent films in PACS  10/14/2020: Severe obstruction with severe diffusion defect  FEV1/FVC Ratio: 41 %  Forced Vital Capacity: 2 72 L    61 % predicted  FEV1: 1 11 L     33 % predicted     Lung volumes by body plethysmography:   Total Lung Capacity 89 % predicted   Residual volume 144 % predicted     DLCO corrected for patients hemoglobin level: 36 %    Vasu Bradley

## 2021-05-03 ENCOUNTER — TELEPHONE (OUTPATIENT)
Dept: PULMONOLOGY | Facility: CLINIC | Age: 76
End: 2021-05-03

## 2021-05-03 NOTE — TELEPHONE ENCOUNTER
Patient calling for results of his CXR  He states Dr Audelia Contreras wanted him to get it done ASAP so he went on 4/29 after seeing him in the office but he still has not heard anything  Please advise

## 2021-05-03 NOTE — TELEPHONE ENCOUNTER
Called pt adivsed him of the results and reccomendations   He stated they are trying to get the PET scan done but his sugar levels are to high right now but once he gets them below 200 then he will be able to schedule the PET scan

## 2021-05-09 DIAGNOSIS — K21.9 GASTROESOPHAGEAL REFLUX DISEASE: ICD-10-CM

## 2021-05-09 DIAGNOSIS — M54.50 CHRONIC BILATERAL LOW BACK PAIN, UNSPECIFIED WHETHER SCIATICA PRESENT: ICD-10-CM

## 2021-05-09 DIAGNOSIS — G89.29 CHRONIC BILATERAL LOW BACK PAIN, UNSPECIFIED WHETHER SCIATICA PRESENT: ICD-10-CM

## 2021-05-09 RX ORDER — PANTOPRAZOLE SODIUM 40 MG/1
TABLET, DELAYED RELEASE ORAL
Qty: 60 TABLET | Refills: 2 | Status: SHIPPED | OUTPATIENT
Start: 2021-05-09 | End: 2022-01-30 | Stop reason: HOSPADM

## 2021-05-09 RX ORDER — CYCLOBENZAPRINE HCL 10 MG
TABLET ORAL
Qty: 60 TABLET | Refills: 0 | Status: SHIPPED | OUTPATIENT
Start: 2021-05-09 | End: 2021-05-20

## 2021-05-12 ENCOUNTER — HOSPITAL ENCOUNTER (OUTPATIENT)
Dept: RADIOLOGY | Age: 76
Discharge: HOME | End: 2021-05-12
Payer: COMMERCIAL

## 2021-05-12 DIAGNOSIS — C34.11 PRIMARY ADENOCARCINOMA OF UPPER LOBE OF RIGHT LUNG (HCC): ICD-10-CM

## 2021-05-12 LAB
GLUCOSE SERPL-MCNC: 118 MG/DL (ref 65–140)
GLUCOSE SERPL-MCNC: 99 MG/DL (ref 65–140)

## 2021-05-12 PROCEDURE — 82948 REAGENT STRIP/BLOOD GLUCOSE: CPT

## 2021-05-12 PROCEDURE — A9552 F18 FDG: HCPCS

## 2021-05-20 DIAGNOSIS — M54.50 CHRONIC BILATERAL LOW BACK PAIN, UNSPECIFIED WHETHER SCIATICA PRESENT: ICD-10-CM

## 2021-05-20 DIAGNOSIS — G89.29 CHRONIC BILATERAL LOW BACK PAIN, UNSPECIFIED WHETHER SCIATICA PRESENT: ICD-10-CM

## 2021-05-20 RX ORDER — CYCLOBENZAPRINE HCL 10 MG
TABLET ORAL
Qty: 60 TABLET | Refills: 0 | Status: SHIPPED | OUTPATIENT
Start: 2021-05-20 | End: 2021-06-25

## 2021-05-27 ENCOUNTER — HOSPITAL ENCOUNTER (OUTPATIENT)
Dept: RADIOLOGY | Age: 76
Discharge: HOME | End: 2021-05-27
Payer: COMMERCIAL

## 2021-05-27 ENCOUNTER — HOSPITAL ENCOUNTER (OUTPATIENT)
Dept: RADIOLOGY | Age: 76
Discharge: HOME | End: 2021-05-27

## 2021-05-27 DIAGNOSIS — C34.11 MALIGNANT NEOPLASM OF UPPER LOBE OF RIGHT LUNG (HCC): ICD-10-CM

## 2021-05-27 LAB
GLUCOSE SERPL-MCNC: 67 MG/DL (ref 65–140)
GLUCOSE SERPL-MCNC: 71 MG/DL (ref 65–140)
GLUCOSE SERPL-MCNC: 90 MG/DL (ref 65–140)

## 2021-05-27 PROCEDURE — 78815 PET IMAGE W/CT SKULL-THIGH: CPT

## 2021-05-27 PROCEDURE — A9552 F18 FDG: HCPCS

## 2021-05-27 PROCEDURE — 82948 REAGENT STRIP/BLOOD GLUCOSE: CPT

## 2021-06-08 DIAGNOSIS — E78.00 HYPERCHOLESTEROLEMIA: ICD-10-CM

## 2021-06-08 RX ORDER — SIMVASTATIN 40 MG
TABLET ORAL
Qty: 60 TABLET | Refills: 2 | Status: SHIPPED | OUTPATIENT
Start: 2021-06-08 | End: 2022-05-17 | Stop reason: SDUPTHER

## 2021-06-18 ENCOUNTER — TELEPHONE (OUTPATIENT)
Dept: RADIATION ONCOLOGY | Facility: HOSPITAL | Age: 76
End: 2021-06-18

## 2021-06-25 DIAGNOSIS — G89.29 CHRONIC BILATERAL LOW BACK PAIN, UNSPECIFIED WHETHER SCIATICA PRESENT: ICD-10-CM

## 2021-06-25 DIAGNOSIS — E11.9 DIABETES MELLITUS WITHOUT COMPLICATION (HCC): ICD-10-CM

## 2021-06-25 DIAGNOSIS — M54.50 CHRONIC BILATERAL LOW BACK PAIN, UNSPECIFIED WHETHER SCIATICA PRESENT: ICD-10-CM

## 2021-06-25 RX ORDER — BLOOD SUGAR DIAGNOSTIC
STRIP MISCELLANEOUS
Qty: 150 STRIP | Refills: 5 | Status: SHIPPED | OUTPATIENT
Start: 2021-06-25 | End: 2022-02-23 | Stop reason: HOSPADM

## 2021-06-25 RX ORDER — CYCLOBENZAPRINE HCL 10 MG
TABLET ORAL
Qty: 60 TABLET | Refills: 0 | Status: SHIPPED | OUTPATIENT
Start: 2021-06-25 | End: 2021-08-09

## 2021-07-23 ENCOUNTER — APPOINTMENT (EMERGENCY)
Dept: RADIOLOGY | Facility: HOSPITAL | Age: 76
DRG: 683 | End: 2021-07-23
Payer: COMMERCIAL

## 2021-07-23 ENCOUNTER — HOSPITAL ENCOUNTER (INPATIENT)
Facility: HOSPITAL | Age: 76
LOS: 2 days | Discharge: HOME | DRG: 683 | End: 2021-07-25
Attending: EMERGENCY MEDICINE | Admitting: INTERNAL MEDICINE
Payer: COMMERCIAL

## 2021-07-23 DIAGNOSIS — E87.1 HYPONATREMIA: ICD-10-CM

## 2021-07-23 DIAGNOSIS — I50.32 CHRONIC DIASTOLIC HEART FAILURE (HCC): ICD-10-CM

## 2021-07-23 DIAGNOSIS — K43.9 VENTRAL HERNIA: ICD-10-CM

## 2021-07-23 DIAGNOSIS — N18.9 ACUTE KIDNEY INJURY SUPERIMPOSED ON CHRONIC KIDNEY DISEASE (HCC): ICD-10-CM

## 2021-07-23 DIAGNOSIS — E87.5 HYPERKALEMIA: ICD-10-CM

## 2021-07-23 DIAGNOSIS — N17.9 AKI (ACUTE KIDNEY INJURY) (HCC): Primary | ICD-10-CM

## 2021-07-23 DIAGNOSIS — N17.9 ACUTE KIDNEY INJURY SUPERIMPOSED ON CHRONIC KIDNEY DISEASE (HCC): ICD-10-CM

## 2021-07-23 DIAGNOSIS — J90 RECURRENT RIGHT PLEURAL EFFUSION: ICD-10-CM

## 2021-07-23 DIAGNOSIS — R07.9 CHEST PAIN: ICD-10-CM

## 2021-07-23 PROBLEM — R07.89 OTHER CHEST PAIN: Status: ACTIVE | Noted: 2019-06-14

## 2021-07-23 LAB
ALBUMIN SERPL BCP-MCNC: 3.3 G/DL (ref 3.5–5)
ALP SERPL-CCNC: 97 U/L (ref 46–116)
ALT SERPL W P-5'-P-CCNC: 17 U/L (ref 12–78)
ANION GAP SERPL CALCULATED.3IONS-SCNC: 4 MMOL/L (ref 4–13)
AST SERPL W P-5'-P-CCNC: 13 U/L (ref 5–45)
BASOPHILS # BLD AUTO: 0.02 THOUSANDS/ΜL (ref 0–0.1)
BASOPHILS NFR BLD AUTO: 0 % (ref 0–1)
BILIRUB SERPL-MCNC: 0.29 MG/DL (ref 0.2–1)
BUN SERPL-MCNC: 23 MG/DL (ref 5–25)
CALCIUM ALBUM COR SERPL-MCNC: 9.9 MG/DL (ref 8.3–10.1)
CALCIUM SERPL-MCNC: 9.3 MG/DL (ref 8.3–10.1)
CHLORIDE SERPL-SCNC: 95 MMOL/L (ref 100–108)
CO2 SERPL-SCNC: 34 MMOL/L (ref 21–32)
CREAT SERPL-MCNC: 1.64 MG/DL (ref 0.6–1.3)
D DIMER PPP FEU-MCNC: 1.42 UG/ML FEU
EOSINOPHIL # BLD AUTO: 0.05 THOUSAND/ΜL (ref 0–0.61)
EOSINOPHIL NFR BLD AUTO: 1 % (ref 0–6)
ERYTHROCYTE [DISTWIDTH] IN BLOOD BY AUTOMATED COUNT: 15.2 % (ref 11.6–15.1)
GFR SERPL CREATININE-BSD FRML MDRD: 41 ML/MIN/1.73SQ M
GLUCOSE SERPL-MCNC: 332 MG/DL (ref 65–140)
HCT VFR BLD AUTO: 42.7 % (ref 36.5–49.3)
HGB BLD-MCNC: 13.1 G/DL (ref 12–17)
IMM GRANULOCYTES # BLD AUTO: 0.07 THOUSAND/UL (ref 0–0.2)
IMM GRANULOCYTES NFR BLD AUTO: 1 % (ref 0–2)
LYMPHOCYTES # BLD AUTO: 0.91 THOUSANDS/ΜL (ref 0.6–4.47)
LYMPHOCYTES NFR BLD AUTO: 11 % (ref 14–44)
MCH RBC QN AUTO: 24.5 PG (ref 26.8–34.3)
MCHC RBC AUTO-ENTMCNC: 30.7 G/DL (ref 31.4–37.4)
MCV RBC AUTO: 80 FL (ref 82–98)
MONOCYTES # BLD AUTO: 0.58 THOUSAND/ΜL (ref 0.17–1.22)
MONOCYTES NFR BLD AUTO: 7 % (ref 4–12)
NEUTROPHILS # BLD AUTO: 6.82 THOUSANDS/ΜL (ref 1.85–7.62)
NEUTS SEG NFR BLD AUTO: 80 % (ref 43–75)
NRBC BLD AUTO-RTO: 0 /100 WBCS
NT-PROBNP SERPL-MCNC: 437 PG/ML
PLATELET # BLD AUTO: 144 THOUSANDS/UL (ref 149–390)
PMV BLD AUTO: 11 FL (ref 8.9–12.7)
POTASSIUM SERPL-SCNC: 4.3 MMOL/L (ref 3.5–5.3)
PROT SERPL-MCNC: 8.6 G/DL (ref 6.4–8.2)
RBC # BLD AUTO: 5.34 MILLION/UL (ref 3.88–5.62)
SODIUM SERPL-SCNC: 133 MMOL/L (ref 136–145)
TROPONIN I SERPL-MCNC: <0.02 NG/ML
WBC # BLD AUTO: 8.45 THOUSAND/UL (ref 4.31–10.16)

## 2021-07-23 PROCEDURE — 99285 EMERGENCY DEPT VISIT HI MDM: CPT | Performed by: EMERGENCY MEDICINE

## 2021-07-23 PROCEDURE — 83880 ASSAY OF NATRIURETIC PEPTIDE: CPT | Performed by: EMERGENCY MEDICINE

## 2021-07-23 PROCEDURE — 99285 EMERGENCY DEPT VISIT HI MDM: CPT

## 2021-07-23 PROCEDURE — 84484 ASSAY OF TROPONIN QUANT: CPT | Performed by: EMERGENCY MEDICINE

## 2021-07-23 PROCEDURE — 93005 ELECTROCARDIOGRAM TRACING: CPT

## 2021-07-23 PROCEDURE — G1004 CDSM NDSC: HCPCS

## 2021-07-23 PROCEDURE — 71275 CT ANGIOGRAPHY CHEST: CPT

## 2021-07-23 PROCEDURE — 71045 X-RAY EXAM CHEST 1 VIEW: CPT

## 2021-07-23 PROCEDURE — 36415 COLL VENOUS BLD VENIPUNCTURE: CPT

## 2021-07-23 PROCEDURE — 96374 THER/PROPH/DIAG INJ IV PUSH: CPT

## 2021-07-23 PROCEDURE — 80053 COMPREHEN METABOLIC PANEL: CPT | Performed by: EMERGENCY MEDICINE

## 2021-07-23 PROCEDURE — 85379 FIBRIN DEGRADATION QUANT: CPT | Performed by: EMERGENCY MEDICINE

## 2021-07-23 PROCEDURE — 85025 COMPLETE CBC W/AUTO DIFF WBC: CPT | Performed by: EMERGENCY MEDICINE

## 2021-07-23 RX ORDER — FUROSEMIDE 10 MG/ML
40 INJECTION INTRAMUSCULAR; INTRAVENOUS ONCE
Status: COMPLETED | OUTPATIENT
Start: 2021-07-23 | End: 2021-07-23

## 2021-07-23 RX ADMIN — FUROSEMIDE 40 MG: 10 INJECTION, SOLUTION INTRAVENOUS at 21:46

## 2021-07-23 RX ADMIN — IOHEXOL 100 ML: 350 INJECTION, SOLUTION INTRAVENOUS at 22:24

## 2021-07-24 LAB
ANION GAP SERPL CALCULATED.3IONS-SCNC: 1 MMOL/L (ref 4–13)
ATRIAL RATE: 93 BPM
ATRIAL RATE: 96 BPM
BUN SERPL-MCNC: 22 MG/DL (ref 5–25)
CALCIUM SERPL-MCNC: 9 MG/DL (ref 8.3–10.1)
CHLORIDE SERPL-SCNC: 97 MMOL/L (ref 100–108)
CO2 SERPL-SCNC: 38 MMOL/L (ref 21–32)
CREAT SERPL-MCNC: 1.38 MG/DL (ref 0.6–1.3)
ERYTHROCYTE [DISTWIDTH] IN BLOOD BY AUTOMATED COUNT: 15.1 % (ref 11.6–15.1)
GFR SERPL CREATININE-BSD FRML MDRD: 50 ML/MIN/1.73SQ M
GLUCOSE SERPL-MCNC: 158 MG/DL (ref 65–140)
GLUCOSE SERPL-MCNC: 184 MG/DL (ref 65–140)
GLUCOSE SERPL-MCNC: 224 MG/DL (ref 65–140)
GLUCOSE SERPL-MCNC: 228 MG/DL (ref 65–140)
GLUCOSE SERPL-MCNC: 244 MG/DL (ref 65–140)
GLUCOSE SERPL-MCNC: 322 MG/DL (ref 65–140)
HCT VFR BLD AUTO: 41.3 % (ref 36.5–49.3)
HGB BLD-MCNC: 12.6 G/DL (ref 12–17)
MAGNESIUM SERPL-MCNC: 2.3 MG/DL (ref 1.6–2.6)
MCH RBC QN AUTO: 24.7 PG (ref 26.8–34.3)
MCHC RBC AUTO-ENTMCNC: 30.5 G/DL (ref 31.4–37.4)
MCV RBC AUTO: 81 FL (ref 82–98)
P AXIS: 81 DEGREES
P AXIS: 92 DEGREES
PLATELET # BLD AUTO: 124 THOUSANDS/UL (ref 149–390)
PLATELET # BLD AUTO: 136 THOUSANDS/UL (ref 149–390)
PMV BLD AUTO: 10.3 FL (ref 8.9–12.7)
PMV BLD AUTO: 11 FL (ref 8.9–12.7)
POTASSIUM SERPL-SCNC: 3.8 MMOL/L (ref 3.5–5.3)
PR INTERVAL: 174 MS
PR INTERVAL: 232 MS
QRS AXIS: 120 DEGREES
QRS AXIS: 38 DEGREES
QRSD INTERVAL: 138 MS
QRSD INTERVAL: 140 MS
QT INTERVAL: 392 MS
QT INTERVAL: 400 MS
QTC INTERVAL: 495 MS
QTC INTERVAL: 497 MS
RBC # BLD AUTO: 5.11 MILLION/UL (ref 3.88–5.62)
SODIUM SERPL-SCNC: 136 MMOL/L (ref 136–145)
T WAVE AXIS: 57 DEGREES
T WAVE AXIS: 9 DEGREES
TROPONIN I SERPL-MCNC: <0.02 NG/ML
TROPONIN I SERPL-MCNC: <0.02 NG/ML
VENTRICULAR RATE: 93 BPM
VENTRICULAR RATE: 96 BPM
WBC # BLD AUTO: 7.1 THOUSAND/UL (ref 4.31–10.16)

## 2021-07-24 PROCEDURE — 94760 N-INVAS EAR/PLS OXIMETRY 1: CPT

## 2021-07-24 PROCEDURE — 84484 ASSAY OF TROPONIN QUANT: CPT | Performed by: INTERNAL MEDICINE

## 2021-07-24 PROCEDURE — 93010 ELECTROCARDIOGRAM REPORT: CPT | Performed by: INTERNAL MEDICINE

## 2021-07-24 PROCEDURE — RECHECK: Performed by: NURSE PRACTITIONER

## 2021-07-24 PROCEDURE — 85049 AUTOMATED PLATELET COUNT: CPT | Performed by: INTERNAL MEDICINE

## 2021-07-24 PROCEDURE — 80048 BASIC METABOLIC PNL TOTAL CA: CPT | Performed by: INTERNAL MEDICINE

## 2021-07-24 PROCEDURE — 83735 ASSAY OF MAGNESIUM: CPT | Performed by: INTERNAL MEDICINE

## 2021-07-24 PROCEDURE — 82948 REAGENT STRIP/BLOOD GLUCOSE: CPT

## 2021-07-24 PROCEDURE — 94640 AIRWAY INHALATION TREATMENT: CPT

## 2021-07-24 PROCEDURE — 99223 1ST HOSP IP/OBS HIGH 75: CPT | Performed by: INTERNAL MEDICINE

## 2021-07-24 PROCEDURE — 85027 COMPLETE CBC AUTOMATED: CPT | Performed by: INTERNAL MEDICINE

## 2021-07-24 PROCEDURE — 99222 1ST HOSP IP/OBS MODERATE 55: CPT | Performed by: INTERNAL MEDICINE

## 2021-07-24 RX ORDER — LEVALBUTEROL INHALATION SOLUTION 0.63 MG/3ML
1.25 SOLUTION RESPIRATORY (INHALATION)
Status: DISCONTINUED | OUTPATIENT
Start: 2021-07-24 | End: 2021-07-24

## 2021-07-24 RX ORDER — LIDOCAINE 50 MG/G
1 PATCH TOPICAL DAILY
Status: DISCONTINUED | OUTPATIENT
Start: 2021-07-24 | End: 2021-07-25 | Stop reason: HOSPADM

## 2021-07-24 RX ORDER — HEPARIN SODIUM 5000 [USP'U]/ML
5000 INJECTION, SOLUTION INTRAVENOUS; SUBCUTANEOUS EVERY 8 HOURS SCHEDULED
Status: DISCONTINUED | OUTPATIENT
Start: 2021-07-24 | End: 2021-07-25 | Stop reason: HOSPADM

## 2021-07-24 RX ORDER — TAMSULOSIN HYDROCHLORIDE 0.4 MG/1
0.4 CAPSULE ORAL
Status: DISCONTINUED | OUTPATIENT
Start: 2021-07-24 | End: 2021-07-25 | Stop reason: HOSPADM

## 2021-07-24 RX ORDER — PRAVASTATIN SODIUM 80 MG/1
80 TABLET ORAL
Status: DISCONTINUED | OUTPATIENT
Start: 2021-07-24 | End: 2021-07-25 | Stop reason: HOSPADM

## 2021-07-24 RX ORDER — LEVALBUTEROL 1.25 MG/.5ML
1.25 SOLUTION, CONCENTRATE RESPIRATORY (INHALATION)
Status: DISCONTINUED | OUTPATIENT
Start: 2021-07-24 | End: 2021-07-25

## 2021-07-24 RX ORDER — SODIUM CHLORIDE FOR INHALATION 0.9 %
3 VIAL, NEBULIZER (ML) INHALATION
Status: DISCONTINUED | OUTPATIENT
Start: 2021-07-24 | End: 2021-07-25

## 2021-07-24 RX ORDER — CYCLOBENZAPRINE HCL 10 MG
10 TABLET ORAL 3 TIMES DAILY
Status: DISCONTINUED | OUTPATIENT
Start: 2021-07-24 | End: 2021-07-25 | Stop reason: HOSPADM

## 2021-07-24 RX ORDER — ACETAMINOPHEN 325 MG/1
975 TABLET ORAL EVERY 6 HOURS PRN
Status: DISCONTINUED | OUTPATIENT
Start: 2021-07-24 | End: 2021-07-25 | Stop reason: HOSPADM

## 2021-07-24 RX ORDER — FUROSEMIDE 10 MG/ML
40 INJECTION INTRAMUSCULAR; INTRAVENOUS ONCE
Status: COMPLETED | OUTPATIENT
Start: 2021-07-24 | End: 2021-07-24

## 2021-07-24 RX ORDER — NITROGLYCERIN 0.4 MG/1
0.4 TABLET SUBLINGUAL
Status: DISCONTINUED | OUTPATIENT
Start: 2021-07-24 | End: 2021-07-25 | Stop reason: HOSPADM

## 2021-07-24 RX ORDER — PANTOPRAZOLE SODIUM 40 MG/1
40 TABLET, DELAYED RELEASE ORAL
Status: DISCONTINUED | OUTPATIENT
Start: 2021-07-24 | End: 2021-07-25 | Stop reason: HOSPADM

## 2021-07-24 RX ORDER — ASPIRIN 81 MG/1
81 TABLET ORAL DAILY
Status: DISCONTINUED | OUTPATIENT
Start: 2021-07-24 | End: 2021-07-25 | Stop reason: HOSPADM

## 2021-07-24 RX ADMIN — INSULIN HUMAN 45 UNITS: 500 INJECTION, SOLUTION SUBCUTANEOUS at 09:08

## 2021-07-24 RX ADMIN — PANTOPRAZOLE SODIUM 40 MG: 40 TABLET, DELAYED RELEASE ORAL at 09:02

## 2021-07-24 RX ADMIN — CYCLOBENZAPRINE HYDROCHLORIDE 10 MG: 10 TABLET, FILM COATED ORAL at 15:19

## 2021-07-24 RX ADMIN — CYCLOBENZAPRINE HYDROCHLORIDE 10 MG: 10 TABLET, FILM COATED ORAL at 09:02

## 2021-07-24 RX ADMIN — HEPARIN SODIUM 5000 UNITS: 5000 INJECTION INTRAVENOUS; SUBCUTANEOUS at 15:19

## 2021-07-24 RX ADMIN — TIOTROPIUM BROMIDE 18 MCG: 18 CAPSULE ORAL; RESPIRATORY (INHALATION) at 17:07

## 2021-07-24 RX ADMIN — PRAVASTATIN SODIUM 80 MG: 80 TABLET ORAL at 17:02

## 2021-07-24 RX ADMIN — LEVALBUTEROL HYDROCHLORIDE 1.25 MG: 1.25 SOLUTION, CONCENTRATE RESPIRATORY (INHALATION) at 08:51

## 2021-07-24 RX ADMIN — ISODIUM CHLORIDE 3 ML: 0.03 SOLUTION RESPIRATORY (INHALATION) at 13:34

## 2021-07-24 RX ADMIN — INSULIN LISPRO 8 UNITS: 100 INJECTION, SOLUTION INTRAVENOUS; SUBCUTANEOUS at 12:33

## 2021-07-24 RX ADMIN — CYCLOBENZAPRINE HYDROCHLORIDE 10 MG: 10 TABLET, FILM COATED ORAL at 02:28

## 2021-07-24 RX ADMIN — HEPARIN SODIUM 5000 UNITS: 5000 INJECTION INTRAVENOUS; SUBCUTANEOUS at 02:29

## 2021-07-24 RX ADMIN — ISODIUM CHLORIDE 3 ML: 0.03 SOLUTION RESPIRATORY (INHALATION) at 20:26

## 2021-07-24 RX ADMIN — LIDOCAINE 1 PATCH: 50 PATCH TOPICAL at 02:28

## 2021-07-24 RX ADMIN — LIDOCAINE 1 PATCH: 50 PATCH TOPICAL at 09:02

## 2021-07-24 RX ADMIN — HEPARIN SODIUM 5000 UNITS: 5000 INJECTION INTRAVENOUS; SUBCUTANEOUS at 23:18

## 2021-07-24 RX ADMIN — INSULIN LISPRO 1 UNITS: 100 INJECTION, SOLUTION INTRAVENOUS; SUBCUTANEOUS at 02:29

## 2021-07-24 RX ADMIN — CYCLOBENZAPRINE HYDROCHLORIDE 10 MG: 10 TABLET, FILM COATED ORAL at 23:18

## 2021-07-24 RX ADMIN — INSULIN HUMAN 45 UNITS: 500 INJECTION, SOLUTION SUBCUTANEOUS at 17:02

## 2021-07-24 RX ADMIN — TAMSULOSIN HYDROCHLORIDE 0.4 MG: 0.4 CAPSULE ORAL at 17:02

## 2021-07-24 RX ADMIN — METOPROLOL TARTRATE 25 MG: 25 TABLET, FILM COATED ORAL at 17:02

## 2021-07-24 RX ADMIN — INSULIN LISPRO 4 UNITS: 100 INJECTION, SOLUTION INTRAVENOUS; SUBCUTANEOUS at 09:03

## 2021-07-24 RX ADMIN — LEVALBUTEROL HYDROCHLORIDE 1.25 MG: 1.25 SOLUTION, CONCENTRATE RESPIRATORY (INHALATION) at 13:34

## 2021-07-24 RX ADMIN — FUROSEMIDE 40 MG: 10 INJECTION, SOLUTION INTRAVENOUS at 15:19

## 2021-07-24 RX ADMIN — LEVALBUTEROL HYDROCHLORIDE 1.25 MG: 1.25 SOLUTION, CONCENTRATE RESPIRATORY (INHALATION) at 20:26

## 2021-07-24 RX ADMIN — HEPARIN SODIUM 5000 UNITS: 5000 INJECTION INTRAVENOUS; SUBCUTANEOUS at 09:03

## 2021-07-24 RX ADMIN — INSULIN LISPRO 4 UNITS: 100 INJECTION, SOLUTION INTRAVENOUS; SUBCUTANEOUS at 17:03

## 2021-07-24 RX ADMIN — INSULIN LISPRO 1 UNITS: 100 INJECTION, SOLUTION INTRAVENOUS; SUBCUTANEOUS at 23:17

## 2021-07-24 RX ADMIN — ISODIUM CHLORIDE 3 ML: 0.03 SOLUTION RESPIRATORY (INHALATION) at 08:50

## 2021-07-24 RX ADMIN — ASPIRIN 81 MG: 81 TABLET, COATED ORAL at 09:02

## 2021-07-24 RX ADMIN — METOPROLOL TARTRATE 25 MG: 25 TABLET, FILM COATED ORAL at 09:02

## 2021-07-24 NOTE — ASSESSMENT & PLAN NOTE
· Not in exacerbation   · Continue Brio and Spiriva in place of home Trelegy  · PRN levalbuterol neb  · Non compliant with home CPAP HS,

## 2021-07-24 NOTE — ASSESSMENT & PLAN NOTE
Lab Results   Component Value Date    HGBA1C 9 2 (H) 04/14/2021       No results for input(s): POCGLU in the last 72 hours      Blood Sugar Average: Last 72 hrs:     · Continue insulin regular 45 units BID and 35 units at bedtime  · Add SSI with bedtime coverage  · Avoid hypoglycemia

## 2021-07-24 NOTE — ASSESSMENT & PLAN NOTE
· Stg IA2 per chart review  · Per chart review pt follows with Dr Brant Weems, scheduled for PET-CT and CXR for assessment of potential malignant effusion  · On last admission for SOB pt had thoracentesis with bloody lymphocytic predominant effusion with negative cytology    · CTA PE study with "small/moderate size right pleural effusion"   · Pending pulmonary input

## 2021-07-24 NOTE — ASSESSMENT & PLAN NOTE
Lab Results   Component Value Date    HGBA1C 9 2 (H) 04/14/2021       Recent Labs     07/24/21  0017 07/24/21  0609   POCGLU 184* 224*       Blood Sugar Average: Last 72 hrs:  (P) 204   · Continue insulin U500 45 units BID and 35 units at bedtime  · Add SSI with bedtime coverage  · Avoid hypoglycemia

## 2021-07-24 NOTE — PROGRESS NOTES
[facility]  Progress Note - Ginette Galeana Sr  1945, 70 y o  male MRN: [id number]  Unit/Bed#: CW2 212-01 Encounter: [id number]  Primary Care Provider: Sharon Webster MD   Date and time admitted to hospital: 7/23/2021  8:08 PM    * Atypical chest pain  Assessment & Plan  · Presented to ED for chest pain and SOB   · Negative troponin, elevated D-dimer and elevated BNP  · CTA PE study: "Stable right lung volume loss secondary to areas of rounded atelectasis at the right middle and lower lobes, Small/moderate size right pleural effusion"  · Known lung lesions s/p multiple doses of radiation and h/o pleurocentesis  · Continue SL nitro PRN      COPD, severe (HCC)  Assessment & Plan  · Not in exacerbation   · Continue Brio and Spiriva in place of home Trelegy  · PRN levalbuterol neb  · Non compliant with home CPAP HS,     Chronic diastolic heart failure (HCC)  Assessment & Plan  Wt Readings from Last 3 Encounters:   07/24/21 110 kg (242 lb 3 oz)   04/29/21 111 kg (245 lb)   04/19/21 108 kg (238 lb 8 6 oz)     · Continue Lopressor 25mg BID  · Currently on giving one time dosing of lasix iv     Primary adenocarcinoma of upper lobe of right lung (HCC)  Assessment & Plan  · Stg IA2 per chart review  · Per chart review pt follows with Dr Mona Vo, scheduled for PET-CT and CXR for assessment of potential malignant effusion  · On last admission for SOB pt had thoracentesis with bloody lymphocytic predominant effusion with negative cytology    · CTA PE study with "small/moderate size right pleural effusion"   · Pending pulmonary input     Recurrent right pleural effusion  Assessment & Plan  · Recurrent, s/p thoracentesis x2 in past   · Appreciate pulmonology   · - per pulm review of imaging pt has chronic right pleural effusion similar in size to PET -CT scan in may ,   · - There is no indication for thoracentesis as unlikely the cause of his shortness of breath   · - pt reporting he feels better sp one time dose of diuretic and improvement in renal function would would suggest some volume overload   · - recommend observe on current regimen and continue diuresis   · - ordered additional dose of diuretic now 40mg iv will chk renal function in the am   · chk labs in am renal function   · - pulmonary after discussion with patient request surgical consultation for pts ventral hernia discussed will have pt follow up with Dr Israel Hernández outpt for further evaluation   Will discuss with pt in the am   · Pt on 3 liters chronically at home   · Symptoms improved     Acute kidney injury superimposed on chronic kidney disease [facility])  Assessment & Plan  Lab Results   Component Value Date    EGFR 50 07/24/2021    EGFR 41 07/23/2021    EGFR 60 04/19/2021    CREATININE 1 38 (H) 07/24/2021    CREATININE 1 64 (H) 07/23/2021    CREATININE 1 19 04/19/2021     · Pt with improved SOB after trail of 40mg IV lasix  · Consider MARANDA in setting of fluid overload  · Continue Lasix 40mg PO BID    Type 2 diabetes mellitus with hyperglycemia, with long-term current use of insulin [facility])  Assessment & Plan  Lab Results   Component Value Date    HGBA1C 9 2 (H) 04/14/2021       Recent Labs     07/24/21  0017 07/24/21  0609   POCGLU 184* 224*       Blood Sugar Average: Last 72 hrs:  (P) 204   · Continue insulin U500 45 units BID and 35 units at bedtime  · Add SSI with bedtime coverage  · Avoid hypoglycemia    Dyslipidemia  Assessment & Plan  · Pravastatin 80mg, in place of home simvastatin    Obesity (BMI 30-39  9)  Assessment & Plan  · Life style modification discussed        VTE Pharmacologic Prophylaxis: VTE Score: 5 High Risk (Score >/= 5) - Pharmacological DVT Prophylaxis Ordered: heparin  Sequential Compression Devices Ordered  Patient Centered Rounds: I performed bedside rounds with nursing staff today    Discussions with Specialists or Other Care Team Provider: nursing / and red surgery     Education and Discussions with Family / Patient: Patient declined call to   Time Spent for Care: 45 minutes  More than 50% of total time spent on counseling and coordination of care as described above  Current Length of Stay: 1 day(s)  Current Patient Status: Inpatient   Certification Statement: The patient will continue to require additional inpatient hospital stay due to monitoring resp status along with renal function   Discharge Plan: Anticipate discharge tomorrow to home  Code Status: Level 1 - Full Code    Subjective:   Pt is doing a little better than he did on admission chest pain was on the right lower quadrant area has since improved  He denies any cough but does use 3 liters of oxygen at home   He states he has little endurance due to back pain so cant walk far  Objective:     Vitals:   Temp (24hrs), Av 4 °F (36 9 °C), Min:97 9 °F (36 6 °C), Max:99 °F (37 2 °C)    Temp:  [97 9 °F (36 6 °C)-99 °F (37 2 °C)] 98 2 °F (36 8 °C)  HR:  [68-99] 68  Resp:  [17-26] 20  BP: (130-175)/() 132/60  SpO2:  [93 %-100 %] 100 %  Body mass index is 32 85 kg/m²  Input and Output Summary (last 24 hours): Intake/Output Summary (Last 24 hours) at 2021 1655  Last data filed at 2021 1542  Gross per 24 hour   Intake 720 ml   Output 1900 ml   Net -1180 ml       Physical Exam:   Physical Exam  Constitutional:       General: He is not in acute distress  Appearance: He is obese  He is not ill-appearing, toxic-appearing or diaphoretic  HENT:      Head: Normocephalic and atraumatic  Eyes:      General:         Right eye: No discharge  Left eye: No discharge  Conjunctiva/sclera: Conjunctivae normal    Cardiovascular:      Rate and Rhythm: Normal rate  Heart sounds: No murmur heard  No friction rub  No gallop  Pulmonary:      Effort: No respiratory distress  Breath sounds: No stridor  No wheezing, rhonchi or rales        Comments: Poor effort diminished   Chest:      Chest wall: No tenderness  Abdominal:      General: There is no distension  Palpations: There is no mass  Tenderness: There is no abdominal tenderness  There is no right CVA tenderness, left CVA tenderness, guarding or rebound  Hernia: No hernia is present  Musculoskeletal:         General: No swelling, tenderness, deformity or signs of injury  Right lower leg: No edema  Left lower leg: No edema  Comments: pvd bl lower extremeties    Skin:     Coloration: Skin is not jaundiced or pale  Findings: No bruising, erythema, lesion or rash  Neurological:      Mental Status: He is alert and oriented to person, place, and time     Psychiatric:      Comments: Flat depressed           Additional Data:     Labs:  Results from last 7 days   Lab Units 07/24/21 0443 07/23/21 2020   WBC Thousand/uL 7 10 8 45   HEMOGLOBIN g/dL 12 6 13 1   HEMATOCRIT % 41 3 42 7   PLATELETS Thousands/uL 124* 144*   NEUTROS PCT %  --  80*   LYMPHS PCT %  --  11*   MONOS PCT %  --  7   EOS PCT %  --  1     Results from last 7 days   Lab Units 07/24/21 0443 07/23/21 2020   SODIUM mmol/L 136 133*   POTASSIUM mmol/L 3 8 4 3   CHLORIDE mmol/L 97* 95*   CO2 mmol/L 38* 34*   BUN mg/dL 22 23   CREATININE mg/dL 1 38* 1 64*   ANION GAP mmol/L 1* 4   CALCIUM mg/dL 9 0 9 3   ALBUMIN g/dL  --  3 3*   TOTAL BILIRUBIN mg/dL  --  0 29   ALK PHOS U/L  --  97   ALT U/L  --  17   AST U/L  --  13   GLUCOSE RANDOM mg/dL 228* 332*         Results from last 7 days   Lab Units 07/24/21  1543 07/24/21  1106 07/24/21  0609 07/24/21  0017   POC GLUCOSE mg/dl 244* 322* 224* 184*               Lines/Drains:  Invasive Devices     Peripheral Intravenous Line            Peripheral IV 07/23/21 Right Antecubital <1 day                  Telemetry:  Telemetry Orders (From admission, onward)             48 Hour Telemetry Monitoring  Continuous x 48 hours     Question:  Reason for 48 Hour Telemetry  Answer:  Arrhythmias Requiring Medical Therapy (eg  SVT, Vtach/fib, Bradycardia, Uncontrolled A-fib)                 Telemetry Reviewed: Normal Sinus Rhythm bbb  Indication for Continued Telemetry Use: Acute MI/Unstable Angina/Rule out ACS           Imaging: Reviewed radiology reports from this admission including: chest CT scan    Recent Cultures (last 7 days):         Last 24 Hours Medication List:   Current Facility-Administered Medications   Medication Dose Route Frequency Provider Last Rate    acetaminophen  975 mg Oral Q6H PRN Kamala Montes PA-C      aspirin  81 mg Oral Daily Peggye Jump, DO      cyclobenzaprine  10 mg Oral TID Peggye Jump, DO      heparin (porcine)  5,000 Units Subcutaneous [facility] Peggye Jump, DO      insulin lispro  1-6 Units Subcutaneous HS Peggye Jump, DO      insulin lispro  2-12 Units Subcutaneous TID AC Peggye Jump, DO      [START ON 7/25/2021] insulin regular  35 Units Subcutaneous HS Peggye Jump, DO      insulin regular  45 Units Subcutaneous BID AC Peggye Jump, DO      levalbuterol  1 25 mg Nebulization TID Sin Medeiros MD      lidocaine  1 patch Topical Daily Kamala Montes PA-C      metoprolol tartrate  25 mg Oral BID Peggye Jump, DO      nitroglycerin  0 4 mg Sublingual Q5 Min PRN Peggye Jump, DO      pantoprazole  40 mg Oral Early Morning Peggye Jump, DO      pravastatin  80 mg Oral Daily [employer] Kiran, DO      sodium chloride  3 mL Nebulization TID Peggye Jump, DO      tamsulosin  0 4 mg Oral Daily With Dinner Peggye Jump, DO      tiotropium  18 mcg Inhalation Daily Erica Shrestha DO          Today, Patient Was Seen By: YOLANDE Barraza    **Please Note: This note may have been constructed using a voice recognition system  **

## 2021-07-24 NOTE — ASSESSMENT & PLAN NOTE
Wt Readings from Last 3 Encounters:   07/23/21 113 kg (250 lb)   04/29/21 111 kg (245 lb)   04/19/21 108 kg (238 lb 8 6 oz)     · Continue Lopressor 25mg BID  · Continue Lasix 40mg PO BID

## 2021-07-24 NOTE — ASSESSMENT & PLAN NOTE
· Recurrent, s/p thoracentesis x2 in past   · Appreciate pulmonology   · - per pulm review of imaging pt has chronic right pleural effusion similar in size to PET -CT scan in may ,   · - There is no indication for thoracentesis as unlikely the cause of his shortness of breath   · - pt reporting he feels better sp one time dose of diuretic and improvement in renal function would would suggest some volume overload   · - recommend observe on current regimen and continue diuresis   · - ordered additional dose of diuretic now 40mg iv will chk renal function in the am   · chk labs in am renal function   · - pulmonary after discussion with patient request surgical consultation for pts ventral hernia discussed will have pt follow up with Dr Ishaan Jimenez outpt for further evaluation    Will discuss with pt in the am   · Pt on 3 liters chronically at home   · Symptoms improved

## 2021-07-24 NOTE — H&P
[facility]  H&P- Debera Pun Sr  1945, 70 y o  male MRN: [id number]  Unit/Bed#: CW2 212-01 Encounter: [id number]  Primary Care Provider: Arnoldo Fox MD   Date and time admitted to hospital: 7/23/2021  8:08 PM    * Other chest pain  Assessment & Plan  · Presented to ED for chest pain and SOB   · Negative troponin, elevated D-dimer and elevated BNP  · CTA PE study: "Stable right lung volume loss secondary to areas of rounded atelectasis at the right middle and lower lobes, Small/moderate size right pleural effusion"  · Known lung lesions s/p multiple doses of radiation and h/o pleurocentesis  · Continue SL nitro PRN      Recurrent right pleural effusion  Assessment & Plan  · Recurrent, s/p thoracentesis x2 in past   · Will consult pulmonology to determine whether thoracentesis should be indicated/can be performed versus conservative management    Primary adenocarcinoma of upper lobe of right lung ([address] )  Assessment & Plan  · Stg IA2 per chart review  · Per chart review pt follows with Dr Abad Salamanca, scheduled for PET-CT and CXR for assessment of potential malignant effusion  · On last admission for SOB pt had thoracentesis with bloody lymphocytic predominant effusion with negative cytology    · CTA PE study with "small/moderate size right pleural effusion"   · Consider CT surgery consult    RBBB  Assessment & Plan  · Complete RBBB with 1st degree AV block on EKG    Chronic diastolic heart failure (HCC)  Assessment & Plan  Wt Readings from Last 3 Encounters:   07/23/21 113 kg (250 lb)   04/29/21 111 kg (245 lb)   04/19/21 108 kg (238 lb 8 6 oz)     · Continue Lopressor 25mg BID  · Continue Lasix 40mg PO BID    Acute kidney injury superimposed on chronic kidney disease [facility])  Assessment & Plan  Lab Results   Component Value Date    EGFR 41 07/23/2021    EGFR 60 04/19/2021    EGFR 54 04/18/2021    CREATININE 1 64 (H) 07/23/2021    CREATININE 1 19 04/19/2021    CREATININE 1 30 04/18/2021     · Pt with improved SOB after trail of 40mg IV lasix  · Consider MARANDA in setting of fluid overload  · Continue Lasix 40mg PO BID    COPD, severe (HCC)  Assessment & Plan  · Continue Brio and Spiriva in place of home Trelegy  · Consider course of steroids  · PRN levalbuterol neb  · Non compliant with home CPAP HS, will consider trial inpatient    Type 2 diabetes mellitus with hyperglycemia, with long-term current use of insulin (HCC)  Assessment & Plan  Lab Results   Component Value Date    HGBA1C 9 2 (H) 04/14/2021       No results for input(s): POCGLU in the last 72 hours  Blood Sugar Average: Last 72 hrs:     · Continue insulin regular 45 units BID and 35 units at bedtime  · Add SSI with bedtime coverage  · Avoid hypoglycemia    Dyslipidemia  Assessment & Plan  · Pravastatin 80mg, in place of home simvastatin    Obesity (BMI 30-39  9)  Assessment & Plan  · Life style modification discussed        VTE Prophylaxis: Heparin  / sequential compression device   Code Status: Level 1 - Full Code   POLST: POLST form is not discussed and not completed at this time  Anticipated Length of Stay:  Patient will be admitted on an Inpatient basis with an anticipated length of stay of  greater than 2 midnights  Justification for Hospital Stay: Please see detailed plans noted above  Chief Complaint:     Chest pain and shortness of breath  History of Present Illness:  Denise Holm Sr  is a 70 y o  male who presented for evaluation of chest pain and shortness of breath  Has a past medical history significant for CHF, COPD, CAD, type 2 diabetes on intensive insulin therapy, and adenocarcinoma of right upper lung s/p apparent radiation  Reported onset of chest pain approximately 1300H the afternoon of presentation described as central, nonradiating, associated with shortness of breath but not worsened with exertion, and without clear alleviating/exacerbating factors    Denied nausea/vomiting, diaphoresis, fevers/chills, or dizziness/tightness  Reported resolution shortly after arrival to ED for evaluation along with improvement in shortness of breath  He was found negative troponin EKG unchanged from prior; a CTA PE study initially was performed which was negative for pulmonary embolism but did reveal recurrent small- moderate right pleural effusion  Given his history and age he is admitted for further evaluation of chest pain and shortness of breath possibly in setting of right pleural effusion versus heart failure  Continues to endorse improvement at the time my evaluation, and offers no other acute complaints at this time      Review of Systems:    Constitutional:  Denies fever or chills   Eyes:  Denies change in visual acuity   HENT:  Denies nasal congestion or sore throat   Respiratory:  Denies cough but endorsed shortness of breath  Cardiovascular:  Denies edema but endorsed chest pain  GI:  Denies abdominal pain, nausea, vomiting, bloody stools or diarrhea   :  Denies dysuria   Musculoskeletal:  Denies back pain or joint pain   Integument:  Denies rash   Neurologic:  Denies headache, focal weakness or sensory changes   Endocrine:  Denies polyuria or polydipsia   Lymphatic:  Denies swollen glands   Psychiatric:  Denies depression or anxiety     Past Medical and Surgical History:   Past Medical History:   Diagnosis Date    Abdominal pain     Cardiac disease     CHF (congestive heart failure) (HCC)     COPD, severe (HCC)     Coronary artery disease     DDD (degenerative disc disease), lumbar 9/1/2020    Diabetes mellitus ([address] )     Dyspnea     GERD (gastroesophageal reflux disease)     Hyperlipidemia     Hypertension     MI (myocardial infarction) [address] [address] )     with 3 stents    Nodule of apex of right lung     JACQUELINE (obstructive sleep apnea)     Prostate cancer (HCC)      Past Surgical History:   Procedure Laterality Date    ABDOMINAL SURGERY      exploratory    ANGIOPLASTY      3 stents    APPENDECTOMY      COLONOSCOPY      CT NEEDLE BIOPSY LUNG  11/3/2020    ESOPHAGOGASTRODUODENOSCOPY N/A 10/2/2017    Procedure: ESOPHAGOGASTRODUODENOSCOPY (EGD); Surgeon: Nando Murphy MD;  Location: [facility]; Service: Gastroenterology    IR THORACENTESIS  11/3/2020    KNEE CARTILAGE SURGERY      OTHER SURGICAL HISTORY      stent placement    PROSTATE SURGERY      SKIN GRAFT      Basal cell CA back       Meds/Allergies:  Medications Prior to Admission   Medication    aspirin (ECOTRIN LOW STRENGTH) 81 mg EC tablet    cyclobenzaprine (FLEXERIL) 10 mg tablet    fluticasone-umeclidinium-vilanterol (Trelegy Ellipta) 100-62 5-25 MCG/INH inhaler    furosemide (LASIX) 40 mg tablet    insulin regular (HumuLIN R U-500) 500 units/mL CONCENTRATED injection    ketoconazole (NIZORAL) 2 % cream    levalbuterol (XOPENEX) 1 25 mg/3 mL nebulizer solution    metoprolol tartrate (LOPRESSOR) 25 mg tablet    nitroglycerin (NITROSTAT) 0 4 mg SL tablet    Omega-3 Fatty Acids (FISH OIL) 645 MG CAPS    oxyCODONE-acetaminophen (PERCOCET) 7 5-325 MG per tablet    potassium chloride (K-DUR,KLOR-CON) 20 mEq tablet    simvastatin (ZOCOR) 40 mg tablet    tamsulosin (FLOMAX) 0 4 mg    traMADol (ULTRAM) 50 mg tablet    ferrous sulfate 325 (65 Fe) mg tablet    Insulin Syringe/Needle U-500 (BD Insulin Syringe U-500) 31G X 6MM 0 5 ML MISC    OneTouch Ultra test strip    pantoprazole (PROTONIX) 40 mg tablet       Allergies:    Allergies   Allergen Reactions    Crestor [Rosuvastatin]     Metformin GI Intolerance     History:  Marital Status: /Civil Union     Substance Use History:   Social History     Substance and Sexual Activity   Alcohol Use Not Currently     Social History     Tobacco Use   Smoking Status Former Smoker    Packs/day: 1 50    Years: 50 00    Pack years: 75 00    Start date: 38    Quit date: 2020    Years since quittin 0   Smokeless Tobacco Never Used     Social History Substance and Sexual Activity   Drug Use No       Family History:  Family History   Problem Relation Age of Onset    Heart disease Father     Other Father         Mesothelioma        Physical Exam:     Vitals:   Blood Pressure: 138/71 (07/24/21 0307)  Pulse: 89 (07/24/21 0307)  Temperature: 99 °F (37 2 °C) (07/23/21 1835)  Temp Source: Tympanic (07/23/21 1835)  Respirations: 18 (07/24/21 0307)  Weight - Scale: 110 kg (242 lb 4 oz) (07/24/21 0009)  SpO2: 100 % (07/24/21 0307)    Constitutional:  Well developed, obese, no acute distress, non-toxic appearance   Eyes:  PERRL, conjunctiva normal   HENT:  Atraumatic, external ears normal, nose normal, oropharynx moist, no pharyngeal exudates  Neck- normal range of motion, no tenderness, supple   Respiratory:  No respiratory distress, diminished right lower lobe breath sounds, no rales, no wheezing   Cardiovascular:  Normal rate, normal rhythm, no murmurs, no gallops, no rubs   GI:  Soft, nondistended, normal bowel sounds, nontender, no organomegaly, no mass, no rebound, no guarding   :  No costovertebral angle tenderness   Musculoskeletal:  No edema, no tenderness, no deformities   Back- no tenderness  Integument:  Well hydrated, chronic venous stasis changes  Lymphatic:  No lymphadenopathy noted   Neurologic:  Alert &awake, communicative, CN 2-12 normal, normal motor function, normal sensory function, no focal deficits noted   Psychiatric:  Speech and behavior appropriate       Lab Results: I have personally reviewed pertinent films in PACS    Results from last 7 days   Lab Units 07/24/21  0019 07/23/21 2020   WBC Thousand/uL  --  8 45   HEMOGLOBIN g/dL  --  13 1   HEMATOCRIT %  --  42 7   PLATELETS Thousands/uL 136* 144*   NEUTROS PCT %  --  80*   LYMPHS PCT %  --  11*   MONOS PCT %  --  7   EOS PCT %  --  1     Results from last 7 days   Lab Units 07/23/21 2020   POTASSIUM mmol/L 4 3   CHLORIDE mmol/L 95*   CO2 mmol/L 34*   BUN mg/dL 23   CREATININE mg/dL 1 64*   CALCIUM mg/dL 9 3   ALK PHOS U/L 97   ALT U/L 17   AST U/L 13           EKG:  Sinus rhythm first-degree AV block, RBBB    Imaging: I have personally reviewed pertinent reports  CTA ED chest PE study    Result Date: 7/23/2021  Narrative: CTA - CHEST WITH IV CONTRAST - PULMONARY ANGIOGRAM INDICATION:   PE suspected, intermediate prob, positive D-dimer sob  COMPARISON: 4/14/2021  TECHNIQUE: CTA examination of the chest was performed using angiographic technique according to a protocol specifically tailored to evaluate for pulmonary embolism  Axial, sagittal, and coronal 2D reformatted images were created from the source data and  submitted for interpretation  In addition, coronal 3D MIP postprocessing was performed on the acquisition scanner  Radiation dose length product (DLP) for this visit:  652 5 mGy-cm   This examination, like all CT scans performed in the [facility], was performed utilizing techniques to minimize radiation dose exposure, including the use of iterative reconstruction and automated exposure control  IV Contrast:  100 mL of iohexol (OMNIPAQUE)  FINDINGS: PULMONARY ARTERIAL TREE:  No pulmonary embolus is seen  LUNGS:  Stable extensive volume loss throughout the right lung with areas of rounded consolidation consistent Atelectasis at the right middle and lower lobes  Mild atelectasis at the right lung apex  There is no tracheal or endobronchial lesion  PLEURA:  Small/moderate right pleural effusion  HEART/GREAT VESSELS:  Atherosclerotic changes are noted in thoracic aorta and coronary arteries  MEDIASTINUM AND JEREMÍAS:  Rightward mediastinal shift  CHEST WALL AND LOWER NECK:   Unremarkable  VISUALIZED STRUCTURES IN THE UPPER ABDOMEN:  Unremarkable  OSSEOUS STRUCTURES:  No acute fracture or destructive osseous lesion  Impression: No evidence of pulmonary embolus   Stable right lung volume loss secondary to areas of rounded atelectasis at the right middle and lower lobes  Small/moderate size right pleural effusion  Workstation performed: [id number]         ** Please Note: Dragon 360 Dictation voice to text software was used in the creation of this document   **

## 2021-07-24 NOTE — ASSESSMENT & PLAN NOTE
· Continue Brio and Spiriva in place of home Trelegy  · Consider course of steroids  · PRN levalbuterol neb  · Non compliant with home CPAP HS, will consider trial inpatient

## 2021-07-24 NOTE — ASSESSMENT & PLAN NOTE
Lab Results   Component Value Date    EGFR 41 07/23/2021    EGFR 60 04/19/2021    EGFR 54 04/18/2021    CREATININE 1 64 (H) 07/23/2021    CREATININE 1 19 04/19/2021    CREATININE 1 30 04/18/2021     · Pt with improved SOB after trail of 40mg IV lasix  · Consider MARANDA in setting of fluid overload  · Continue Lasix 40mg PO BID

## 2021-07-24 NOTE — PLAN OF CARE
Problem: Potential for Falls  Goal: Patient will remain free of falls  Description: INTERVENTIONS:  - Educate patient/family on patient safety including physical limitations  - Instruct patient to call for assistance with activity   - Consult OT/PT to assist with strengthening/mobility   - Keep Call bell within reach  - Keep bed low and locked with side rails adjusted as appropriate  - Keep care items and personal belongings within reach  - Initiate and maintain comfort rounds  - Make Fall Risk Sign visible to staff  -- Apply yellow socks and bracelet for high fall risk patients  - Consider moving patient to room near nurses station  Outcome: Progressing     Problem: CARDIOVASCULAR - ADULT  Goal: Maintains optimal cardiac output and hemodynamic stability  Description: INTERVENTIONS:  - Monitor I/O, vital signs and rhythm  - Monitor for S/S and trends of decreased cardiac output  - Administer and titrate ordered vasoactive medications to optimize hemodynamic stability  - Assess quality of pulses, skin color and temperature  - Assess for signs of decreased coronary artery perfusion  - Instruct patient to report change in severity of symptoms  Outcome: Progressing     Problem: PAIN - ADULT  Goal: Verbalizes/displays adequate comfort level or baseline comfort level  Description: Interventions:  - Encourage patient to monitor pain and request assistance  - Assess pain using appropriate pain scale  - Administer analgesics based on type and severity of pain and evaluate response  - Implement non-pharmacological measures as appropriate and evaluate response  - Consider cultural and social influences on pain and pain management  - Notify physician/advanced practitioner if interventions unsuccessful or patient reports new pain  Outcome: Progressing     Problem: DISCHARGE PLANNING  Goal: Discharge to home or other facility with appropriate resources  Description: INTERVENTIONS:  - Identify barriers to discharge w/patient and caregiver  - Arrange for needed discharge resources and transportation as appropriate  - Identify discharge learning needs (meds, wound care, etc )  - Arrange for interpretive services to assist at discharge as needed  - Refer to Case Management Department for coordinating discharge planning if the patient needs post-hospital services based on physician/advanced practitioner order or complex needs related to functional status, cognitive ability, or social support system  Outcome: Progressing     Problem: Knowledge Deficit  Goal: Patient/family/caregiver demonstrates understanding of disease process, treatment plan, medications, and discharge instructions  Description: Complete learning assessment and assess knowledge base    Interventions:  - Provide teaching at level of understanding  - Provide teaching via preferred learning methods  Outcome: Progressing

## 2021-07-24 NOTE — ED ATTENDING ATTESTATION
7/23/2021  I, Grant Fernandez MD, saw and evaluated the patient  I have discussed the patient with the resident/non-physician practitioner and agree with the resident's/non-physician practitioner's findings, Plan of Care, and MDM as documented in the resident's/non-physician practitioner's note, except where noted  All available labs and Radiology studies were reviewed  I was present for key portions of any procedure(s) performed by the resident/non-physician practitioner and I was immediately available to provide assistance  At this point I agree with the current assessment done in the Emergency Department  I have conducted an independent evaluation of this patient a history and physical is as follows:    ED Course         Critical Care Time  Procedures    69 yo male with hx of cad, lung ca, chf, copd, gerd, htn, c/o chest pain started at 1pm today  Pain lasted few hours with palpitations and sob and felt tremelous  Pt symptoms now resolved  No fever, no chills  No sick contacts  No recent travel  Vss, afebrile, lungs with rales at bases, rrr, abdomen soft obese, no pedal edema  Cardiac workup, bnp, ddimer

## 2021-07-24 NOTE — ASSESSMENT & PLAN NOTE
· Recurrent, s/p thoracentesis x2 in past   · Will consult pulmonology to determine whether thoracentesis should be indicated/can be performed versus conservative management

## 2021-07-24 NOTE — CONSULTS
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 - status post SBRT  5  Severe COPD - not in exacerbation  6  Diastolic CHF - not in exacerbation  7  Count CKD 3  8  History of nicotine dependence  9  Ventral wall hernia    Plan:    -continue supplemental oxygen and titrate as tolerated; goal SpO2 > 88-92%  -compared to his previous imaging there is stability of right-sided pleural effusion if not improvement  Labs not concerning for pneumonia or CHF exacerbation  Doubt malignancy as PET scan negative as of May 2021  Would defer repeat thoracentesis at this moment  -recommend continued IV Lasix as needed for diuresis and pleural effusion  -continue Xopenex nebulization and add Spiriva  -continue incentive spirometry, out of bed to chair  -monitor off antibiotics  -recommend surgical evaluation for ventral wall hernia to establish care    History of Present Illness   HPI:  Esther Bridges Sr  is a 70 y o  male with stage I A adenocarcinoma of right upper lobe status post radiation, chronic hypoxic respiratory failure on 3 L nasal cannula at baseline, recurrent right-sided pleural effusion status post thoracentesis x3, chronic diastolic heart failure, account CKD 3, severe COPD, insulin-dependent type 2 diabetes presenting for evaluation of chest pain and shortness of breath  Patient reported onset of central chest pain approximately 1:00 p m   On afternoon of 7/23/21  He also reported associated shortness of breath  Patient had negative troponin and unremarkable EKG  Chest imaging revealed small to moderate right pleural effusion with rounded atelectasis  Negative for pulmonary embolism  Pulmonology consulted for recurrent pleural effusion  Upon evaluation the patient he stated that he feels at baseline with resolution of his chest pain and shortness of breath  He states that he is compliant with all his medications denies side effects on the medications  Additionally patient had issues/complaints regarding his ventral wall hernia  Review of systems:  12 point review of systems was completed and was otherwise negative except as listed in HPI  Historical Information   Past Medical History:   Diagnosis Date    Abdominal pain     Cardiac disease     [facility]     COPD, severe (HCC)     Coronary artery disease     DDD (degenerative disc disease), lumbar 9/1/2020    Diabetes mellitus ([facility] )     Dyspnea     GERD (gastroesophageal reflux disease)     Hyperlipidemia     Hypertension     [facility]     with 3 stents    Nodule of apex of right lung     JACQUELINE (obstructive sleep apnea)     Prostate cancer [facility])      Past Surgical History:   Procedure Laterality Date    ABDOMINAL SURGERY      exploratory    ANGIOPLASTY      3 stents    APPENDECTOMY      COLONOSCOPY  2015    CT NEEDLE BIOPSY LUNG  11/3/2020    ESOPHAGOGASTRODUODENOSCOPY N/A 10/2/2017    Procedure: ESOPHAGOGASTRODUODENOSCOPY (EGD); Surgeon: Cookie Sosa MD;  Location: [facility];   Service: Gastroenterology    IR THORACENTESIS  11/3/2020    KNEE CARTILAGE SURGERY      OTHER SURGICAL HISTORY      stent placement    PROSTATE SURGERY      SKIN GRAFT      Basal cell CA back     Family History   Problem Relation Age of Onset    Heart disease Father     Other Father         Mesothelioma        Occupational History: Noncontributory    Social History: Former smoker  Quit 7/13/20  1 5 packs per day for 50 years  75 pack-year smoking history      Meds/Allergies   Current Facility-Administered Medications   Medication Dose Route Frequency    acetaminophen (TYLENOL) tablet 975 mg  975 mg Oral Q6H PRN    aspirin (ECOTRIN LOW STRENGTH) EC tablet 81 mg  81 mg Oral Daily    cyclobenzaprine (FLEXERIL) tablet 10 mg  10 mg Oral TID    heparin (porcine) subcutaneous injection 5,000 Units  5,000 Units Subcutaneous Q8H [facility]    insulin lispro (HumaLOG) 100 units/mL subcutaneous injection 1-6 Units  1-6 Units Subcutaneous HS    insulin lispro (HumaLOG) 100 units/mL subcutaneous injection 2-12 Units  2-12 Units Subcutaneous TID AC    [START ON 7/25/2021] insulin regular (HumuLIN R U-500) 500 units/mL CONCENTRATED injection 35 Units  35 Units Subcutaneous HS    insulin regular (HumuLIN R U-500) 500 units/mL CONCENTRATED injection 45 Units  45 Units Subcutaneous BID AC    levalbuterol (XOPENEX) inhalation solution 1 25 mg  1 25 mg Nebulization TID    lidocaine (LIDODERM) 5 % patch 1 patch  1 patch Topical Daily    metoprolol tartrate (LOPRESSOR) tablet 25 mg  25 mg Oral BID    nitroglycerin (NITROSTAT) SL tablet 0 4 mg  0 4 mg Sublingual Q5 Min PRN    pantoprazole (PROTONIX) EC tablet 40 mg  40 mg Oral Early Morning    pravastatin (PRAVACHOL) tablet 80 mg  80 mg Oral Daily With Dinner    sodium chloride 0 9 % inhalation solution 3 mL  3 mL Nebulization TID    tamsulosin (FLOMAX) capsule 0 4 mg  0 4 mg Oral Daily With Dinner     Medications Prior to Admission   Medication    aspirin (ECOTRIN LOW STRENGTH) 81 mg EC tablet    cyclobenzaprine (FLEXERIL) 10 mg tablet    fluticasone-umeclidinium-vilanterol (Trelegy Ellipta) 100-62 5-25 MCG/INH inhaler    furosemide (LASIX) 40 mg tablet    insulin regular (HumuLIN R U-500) 500 units/mL CONCENTRATED injection    ketoconazole (NIZORAL) 2 % cream    levalbuterol (XOPENEX) 1 25 mg/3 mL nebulizer solution    metoprolol tartrate (LOPRESSOR) 25 mg tablet    nitroglycerin (NITROSTAT) 0 4 mg SL tablet    Omega-3 Fatty Acids (FISH OIL) 645 MG CAPS    oxyCODONE-acetaminophen (PERCOCET) 7 5-325 MG per tablet    potassium chloride (K-DUR,KLOR-CON) 20 mEq tablet    simvastatin (ZOCOR) 40 mg tablet    tamsulosin (FLOMAX) 0 4 mg    traMADol (ULTRAM) 50 mg tablet    ferrous sulfate 325 (65 Fe) mg tablet    Insulin Syringe/Needle U-500 (BD Insulin Syringe U-500) 31G X 6MM 0 5 ML MISC    OneTouch Ultra test strip    pantoprazole (PROTONIX) 40 mg tablet     Allergies   Allergen Reactions    Crestor [Rosuvastatin]     Metformin GI Intolerance       Vitals: Blood pressure 132/60, pulse 86, temperature 97 9 °F (36 6 °C), temperature source Oral, resp  rate 20, weight 110 kg (242 lb 3 oz), SpO2 99 %  , Body mass index is 32 85 kg/m²  Intake/Output Summary (Last 24 hours) at 7/24/2021 1431  Last data filed at 7/24/2021 1107  Gross per 24 hour   Intake 720 ml   Output 1700 ml   Net -980 ml       Physical Exam  Vitals reviewed  Constitutional:       General: He is not in acute distress  Appearance: He is obese  He is not ill-appearing, toxic-appearing or diaphoretic  HENT:      Head: Normocephalic and atraumatic  Right Ear: External ear normal       Left Ear: External ear normal       Nose: Nose normal    Eyes:      General: No scleral icterus  Right eye: No discharge  Left eye: No discharge  Extraocular Movements: Extraocular movements intact  Conjunctiva/sclera: Conjunctivae normal    Cardiovascular:      Rate and Rhythm: Normal rate and regular rhythm  Pulses: Normal pulses  Heart sounds: Normal heart sounds  No murmur heard  No friction rub  No gallop  Pulmonary:      Effort: Pulmonary effort is normal  No respiratory distress  Breath sounds: No stridor  Rales present  No wheezing or rhonchi        Comments: Diminished breath sounds bilateral lung bases  Crackles noted right lung base  Chest:      Chest wall: No tenderness  Abdominal:      General: Bowel sounds are normal  There is no distension  Palpations: Abdomen is soft  Tenderness: There is no abdominal tenderness  There is no guarding or rebound  Musculoskeletal:      Right lower leg: No edema  Left lower leg: No edema  Skin:     General: Skin is warm and dry  Neurological:      Mental Status: He is alert and oriented to person, place, and time  Labs: I have personally reviewed pertinent lab results  Laboratory and Diagnostics  Results from last 7 days   Lab Units 07/24/21 0443 07/24/21 0019 07/23/21 2020   WBC Thousand/uL 7 10  --  8 45   HEMOGLOBIN g/dL 12 6  --  13 1   HEMATOCRIT % 41 3  --  42 7   PLATELETS Thousands/uL 124* 136* 144*   NEUTROS PCT %  --   --  80*   MONOS PCT %  --   --  7     Results from last 7 days   Lab Units 07/24/21 0443 07/23/21 2020   SODIUM mmol/L 136 133*   POTASSIUM mmol/L 3 8 4 3   CHLORIDE mmol/L 97* 95*   CO2 mmol/L 38* 34*   ANION GAP mmol/L 1* 4   BUN mg/dL 22 23   CREATININE mg/dL 1 38* 1 64*   CALCIUM mg/dL 9 0 9 3   GLUCOSE RANDOM mg/dL 228* 332*   ALT U/L  --  17   AST U/L  --  13   ALK PHOS U/L  --  97   ALBUMIN g/dL  --  3 3*   TOTAL BILIRUBIN mg/dL  --  0 29     Results from last 7 days   Lab Units 07/24/21 0443   MAGNESIUM mg/dL 2 3           Results from last 7 days   Lab Units 07/24/21 0443 07/24/21 0019 07/23/21 2020   TROPONIN I ng/mL <0 02 <0 02 <0 02                     Results from last 7 days   Lab Units 07/23/21  2040   D-DIMER QUANTITATIVE ug/ml FEU 1 42*             Imaging and other studies: I have personally reviewed pertinent reports  and I have personally reviewed pertinent films in PACS    CTA pulmonary embolism study 7/23/21:  No pulmonary embolism  Volume loss in the right lung with areas of rounded atelectasis in the right middle and right lower lobe    Stable and somewhat improved as compared to previous imaging  Small/moderate size right pleural effusion  PET scan 5/27/21:  Patchy radiotracer uptake in right upper lobe at the site of the previously seen pulmonary lesion likely related to post treatment changes  No new findings suspicious for hyper metabolic metastasis  Pulmonary function testing (10/14/20):    FEV1/FVC Ratio: 41 %  Forced Vital Capacity: 2 72 L    61 % predicted  FEV1: 1 11 L     33 % predicted     Lung volumes by body plethysmography:   Total Lung Capacity 89 % predicted   Residual volume 144 % predicted     DLCO corrected for patients hemoglobin level: 36 %    EKG, Pathology, and Other Studies: I have personally reviewed pertinent reports  and I have personally reviewed pertinent films in PACS    Echocardiogram 7/30/20:  Ejection fraction 68% with no wall motion abnormalities  Concentric hypertrophy present  Increased wall thickness  Grade 1 diastolic dysfunction  Dilated right ventricle with reduced systolic function  Code Status: Level 1 - Full Code        Portions of the record may have been created with voice recognition software  Occasional wrong word or "sound a like" substitutions may have occurred due to the inherent limitations of voice recognition software  Read the chart carefully and recognize, using context, where substitutions have occurred      DO [facility]  Pulmonary/Critical Care Fellowship, PGY-5

## 2021-07-24 NOTE — ASSESSMENT & PLAN NOTE
Wt Readings from Last 3 Encounters:   07/24/21 110 kg (242 lb 3 oz)   04/29/21 111 kg (245 lb)   04/19/21 108 kg (238 lb 8 6 oz)     · Continue Lopressor 25mg BID  · Currently on giving one time dosing of lasix iv

## 2021-07-24 NOTE — ASSESSMENT & PLAN NOTE
Lab Results   Component Value Date    EGFR 50 07/24/2021    EGFR 41 07/23/2021    EGFR 60 04/19/2021    CREATININE 1 38 (H) 07/24/2021    CREATININE 1 64 (H) 07/23/2021    CREATININE 1 19 04/19/2021     · Pt with improved SOB after trail of 40mg IV lasix  · Consider MARANDA in setting of fluid overload  · Continue Lasix 40mg PO BID

## 2021-07-24 NOTE — ASSESSMENT & PLAN NOTE
· Stg IA2 per chart review  · Per chart review pt follows with Dr Melvi Saravia, scheduled for PET-CT and CXR for assessment of potential malignant effusion  · On last admission for SOB pt had thoracentesis with bloody lymphocytic predominant effusion with negative cytology    · CTA PE study with "small/moderate size right pleural effusion"   · Consider CT surgery consult

## 2021-07-24 NOTE — ED PROVIDER NOTES
History  Chief Complaint   Patient presents with    Rapid Heart Rate     pt c/o rapid heart rate and chest pain since this morning  also reports sob and tremors   Chest Pain     19-year-old male with history CHF, COPD, CKD, diabetes presents to the emergency department for evaluation of chest pain and shortness of breath  The patient reports right-sided chest pain started at approximately 1:00 p m  This afternoon  States that while he was waiting here in the emergency department the pain resolved  With the pain initially started he had associated shortness of breath and was feeling tremulous  The patient also states that he was told that he has a lesion on his right lung  States that he was never told that he had lung cancer but he has had several doses of radiation  Reports that he has also had fluid drained from his lungs in the past as well  Per chart review the patient had a recent PET scan that showed patchy radiotracer uptake in the right upper lobe at the site of the previously seen pulmonary lesion likely related to posttreatment changes  He denies fevers, chills, nausea, vomiting, diarrhea, leg pain/swelling and recent weight gain  He has not taken anything to treat his symptoms prior to coming to the emergency department  Prior to Admission Medications   Prescriptions Last Dose Informant Patient Reported? Taking?    Insulin Syringe/Needle U-500 (BD Insulin Syringe U-500) 31G X 6MM 0 5 ML MISC  Self No No   Sig: USE 1 SYRINGE THREE TIMES A DAY FOR INJECTING INSULIN   Omega-3 Fatty Acids (FISH OIL) 645 MG CAPS  Self Yes Yes   Sig: Take 1 capsule by mouth 2 (two) times a day   OneTouch Ultra test strip   No No   Sig: TEST FOUR TIMES A DAY   aspirin (ECOTRIN LOW STRENGTH) 81 mg EC tablet  Self Yes Yes   Sig: Take 1 tablet by mouth daily   cyclobenzaprine (FLEXERIL) 10 mg tablet   No Yes   Sig: TAKE ONE TABLET BY MOUTH THREE TIMES A DAY   ferrous sulfate 325 (65 Fe) mg tablet Not Taking at Unknown time Self Yes No   Sig: Take 325 mg by mouth daily with breakfast   Patient not taking: Reported on 7/23/2021   fluticasone-umeclidinium-vilanterol (Trelegy Ellipta) 100-62 5-25 MCG/INH inhaler   No Yes   Sig: Inhale 1 puff daily Rinse mouth after use     furosemide (LASIX) 40 mg tablet  Self No Yes   Sig: TAKE ONE TABLET BY MOUTH TWICE A DAY   insulin regular (HumuLIN R U-500) 500 units/mL CONCENTRATED injection  Self No Yes   Sig: Inject 0 09 mL (45 Units total) under the skin 2 (two) times a day before meals And 35 units at bedtime   ketoconazole (NIZORAL) 2 % cream  Self Yes Yes   Sig: ketoconazole 2 % topical cream   levalbuterol (XOPENEX) 1 25 mg/3 mL nebulizer solution  Self Yes Yes   Sig: USE 1 VIAL IN NEBULIZER THREE TIMES A DAY   metoprolol tartrate (LOPRESSOR) 25 mg tablet  Self No Yes   Sig: TAKE ONE TABLET BY MOUTH TWICE A DAY   nitroglycerin (NITROSTAT) 0 4 mg SL tablet  Self No Yes   Sig: Place 1 tablet (0 4 mg total) under the tongue every 5 (five) minutes as needed for chest pain   oxyCODONE-acetaminophen (PERCOCET) 7 5-325 MG per tablet  Self Yes Yes   Sig: Take by mouth every 4 (four) hours   pantoprazole (PROTONIX) 40 mg tablet   No No   Sig: TAKE ONE TABLET BY MOUTH EVERY DAY   potassium chloride (K-DUR,KLOR-CON) 20 mEq tablet  Self No Yes   Sig: TAKE 1 TABLET BY MOUTH EVERY DAY   simvastatin (ZOCOR) 40 mg tablet   No Yes   Sig: TAKE ONE TABLET BY MOUTH EVERY DAY   tamsulosin (FLOMAX) 0 4 mg  Self No Yes   Sig: TAKE 1 CAPSULE BY MOUTH EVERY DAY   traMADol (ULTRAM) 50 mg tablet  Self Yes Yes   Sig: tramadol 50 mg tablet      Facility-Administered Medications: None       Past Medical History:   Diagnosis Date    Abdominal pain     Cardiac disease     CHF (congestive heart failure) (HCC)     COPD, severe (HCC)     Coronary artery disease     DDD (degenerative disc disease), lumbar 9/1/2020    Diabetes mellitus (HCC)     Dyspnea     GERD (gastroesophageal reflux disease)     Hyperlipidemia     Hypertension     MI [facility] infarction) ([facility] [address] )     with 3 stents    Nodule of apex of right lung     JACQUELINE (obstructive sleep apnea)     Prostate cancer [facility])        Past Surgical History:   Procedure Laterality Date    ABDOMINAL SURGERY      exploratory    ANGIOPLASTY      3 stents    APPENDECTOMY      COLONOSCOPY      CT NEEDLE BIOPSY LUNG  11/3/2020    ESOPHAGOGASTRODUODENOSCOPY N/A 10/2/2017    Procedure: ESOPHAGOGASTRODUODENOSCOPY (EGD); Surgeon: Evens Robertson MD;  Location: [facility]; Service: Gastroenterology    IR THORACENTESIS  11/3/2020    KNEE CARTILAGE SURGERY      OTHER SURGICAL HISTORY      stent placement    PROSTATE SURGERY      SKIN GRAFT      Basal cell CA back       Family History   Problem Relation Age of Onset    Heart disease Father     Other Father         Mesothelioma      I have reviewed and agree with the history as documented  E-Cigarette/Vaping    E-Cigarette Use Never User      E-Cigarette/Vaping Substances    Nicotine No     THC No     CBD No     Flavoring No     Other No     Unknown No      Social History     Tobacco Use    Smoking status: Former Smoker     Packs/day: 1 50     Years: 50 00     Pack years: 75 00     Start date: 38     Quit date: 2020     Years since quittin 0    Smokeless tobacco: Never Used   Vaping Use    Vaping Use: Never used   Substance Use Topics    Alcohol use: Not Currently    Drug use: No        Review of Systems   Constitutional: Negative for chills and fever  HENT: Negative for ear pain and sore throat  Eyes: Negative for pain and visual disturbance  Respiratory: Positive for shortness of breath  Negative for cough  Cardiovascular: Positive for chest pain  Negative for palpitations  Gastrointestinal: Negative for abdominal pain and vomiting  Genitourinary: Negative for dysuria and hematuria  Musculoskeletal: Negative for arthralgias and back pain     Skin: Negative for color change and rash  Neurological: Negative for seizures and syncope  All other systems reviewed and are negative  Physical Exam  ED Triage Vitals [07/23/21 1835]   Temperature Pulse Respirations Blood Pressure SpO2   99 °F (37 2 °C) 99 (!) 26 (!) 175/108 98 %      Temp Source Heart Rate Source Patient Position - Orthostatic VS BP Location FiO2 (%)   Tympanic Monitor Sitting Left arm --      Pain Score       4             Orthostatic Vital Signs  Vitals:    07/23/21 1835 07/23/21 2015 07/23/21 2152   BP: (!) 175/108 162/77 132/61   Pulse: 99 70 86   Patient Position - Orthostatic VS: Sitting         Physical Exam  Vitals and nursing note reviewed  Constitutional:       Appearance: He is well-developed  He is ill-appearing  HENT:      Head: Normocephalic and atraumatic  Eyes:      Conjunctiva/sclera: Conjunctivae normal    Cardiovascular:      Rate and Rhythm: Normal rate and regular rhythm  Heart sounds: No murmur heard  Pulmonary:      Effort: Pulmonary effort is normal  No respiratory distress  Breath sounds: Examination of the right-lower field reveals rales  Rales present  Abdominal:      Palpations: Abdomen is soft  Tenderness: There is no abdominal tenderness  Musculoskeletal:      Cervical back: Neck supple  Skin:     General: Skin is warm and dry  Neurological:      Mental Status: He is alert           ED Medications  Medications   furosemide (LASIX) injection 40 mg (40 mg Intravenous Given 7/23/21 2146)   iohexol (OMNIPAQUE) 350 MG/ML injection (MULTI-DOSE) 100 mL (100 mL Intravenous Given 7/23/21 2224)       Diagnostic Studies  Results Reviewed     Procedure Component Value Units Date/Time    NT-BNP PRO [[id number]]  (Abnormal) Collected: 07/23/21 2020    Lab Status: Final result Specimen: Blood from Arm, Right Updated: 07/23/21 2126     NT-proBNP 437 pg/mL     Comprehensive metabolic panel [[id number]]  (Abnormal) Collected: 07/23/21 2020    Lab Status: Final result Specimen: Blood from Arm, Right Updated: 07/23/21 2126     Sodium 133 mmol/L      Potassium 4 3 mmol/L      Chloride 95 mmol/L      CO2 34 mmol/L      ANION GAP 4 mmol/L      BUN 23 mg/dL      Creatinine 1 64 mg/dL      Glucose 332 mg/dL      Calcium 9 3 mg/dL      Corrected Calcium 9 9 mg/dL      AST 13 U/L      ALT 17 U/L      Alkaline Phosphatase 97 U/L      Total Protein 8 6 g/dL      Albumin 3 3 g/dL      Total Bilirubin 0 29 mg/dL      eGFR 41 ml/min/1 73sq m     Narrative:      National Kidney Disease Foundation guidelines for Chronic Kidney Disease (CKD):     Stage 1 with normal or high GFR (GFR > 90 mL/min/1 73 square meters)    Stage 2 Mild CKD (GFR = 60-89 mL/min/1 73 square meters)    Stage 3A Moderate CKD (GFR = 45-59 mL/min/1 73 square meters)    Stage 3B Moderate CKD (GFR = 30-44 mL/min/1 73 square meters)    Stage 4 Severe CKD (GFR = 15-29 mL/min/1 73 square meters)    Stage 5 End Stage CKD (GFR <15 mL/min/1 73 square meters)  Note: GFR calculation is accurate only with a steady state creatinine    Troponin I [[id number]]  (Normal) Collected: 07/23/21 2020    Lab Status: Final result Specimen: Blood from Arm, Right Updated: 07/23/21 2125     Troponin I <0 02 ng/mL     D-Dimer [[id number]]  (Abnormal) Collected: 07/23/21 2040    Lab Status: Final result Specimen: Blood from Arm, Right Updated: 07/23/21 2123     D-Dimer, Quant 1 42 ug/ml FEU     CBC and differential [[id number]]  (Abnormal) Collected: 07/23/21 2020    Lab Status: Final result Specimen: Blood from Arm, Right Updated: 07/23/21 2101     WBC 8 45 Thousand/uL      RBC 5 34 Million/uL      Hemoglobin 13 1 g/dL      Hematocrit 42 7 %      MCV 80 fL      MCH 24 5 pg      MCHC 30 7 g/dL      RDW 15 2 %      MPV 11 0 fL      Platelets 711 Thousands/uL      nRBC 0 /100 WBCs      Neutrophils Relative 80 %      Immat GRANS % 1 %      Lymphocytes Relative 11 %      Monocytes Relative 7 %      Eosinophils Relative 1 %      Basophils Relative 0 % Neutrophils Absolute 6 82 Thousands/µL      Immature Grans Absolute 0 07 Thousand/uL      Lymphocytes Absolute 0 91 Thousands/µL      Monocytes Absolute 0 58 Thousand/µL      Eosinophils Absolute 0 05 Thousand/µL      Basophils Absolute 0 02 Thousands/µL                  CTA ED chest PE study   Final Result by Merari Menjivar MD (07/23 2240)      No evidence of pulmonary embolus  Stable right lung volume loss secondary to areas of rounded atelectasis at the right middle and lower lobes  Small/moderate size right pleural effusion  Workstation performed: [id number]         XR chest 1 view portable    (Results Pending)         Procedures  ECG 12 Lead Documentation Only    Date/Time: 7/23/2021 10:06 PM  Performed by: Yuliet Sanchez MD  Authorized by: Yuliet Sanchez MD     ECG reviewed by me, the ED Provider: yes    Patient location:  ED  Previous ECG:     Previous ECG:  Compared to current    Similarity:  Changes noted    Comparison to cardiac monitor: Yes    Interpretation:     Interpretation: abnormal    Rate:     ECG rate assessment: normal    Rhythm:     Rhythm: sinus rhythm and A-V block    Ectopy:     Ectopy: none    QRS:     QRS axis:  Normal  Conduction:     Conduction: abnormal      Abnormal conduction: complete RBBB and 1st degree    ST segments:     ST segments:  Normal  T waves:     T waves: normal            ED Course             HEART Risk Score      Most Recent Value   Heart Score Risk Calculator   History  1 Filed at: 07/23/2021 2304   ECG  1 Filed at: 07/23/2021 2304   Age  2 Filed at: 07/23/2021 2304   Risk Factors  2 Filed at: 07/23/2021 2304   Troponin  0 Filed at: 07/23/2021 2304   HEART Score  6 Filed at: 07/23/2021 2304        Identification of Seniors at Risk      Most Recent Value   (ISAR) Identification of Seniors at Risk   Before the illness or injury that brought you to the Emergency, did you need someone to help you on a regular basis?   1 Filed at: 07/23/2021 1836   In the last 24 hours, have you needed more help than usual?  0 Filed at: 07/23/2021 1836   Have you been hospitalized for one or more nights during the past 6 months? 1 Filed at: 07/23/2021 1836   In general, do you see well?  0 Filed at: 07/23/2021 1836   In general, do you have serious problems with your memory? 0 Filed at: 07/23/2021 1836   Do you take more than three different medications every day? 1 Filed at: 07/23/2021 1836   ISAR Score  3 Filed at: 07/23/2021 1836                    SBIRT 22yo+      Most Recent Value   SBIRT (25 yo +)   In order to provide better care to our patients, we are screening all of our patients for alcohol and drug use  Would it be okay to ask you these screening questions? No Filed at: 07/23/2021 1837                MDM  Number of Diagnoses or Management Options  MARANDA (acute kidney injury) ([facility] [address] )  Chest pain  Recurrent right pleural effusion  Diagnosis management comments: 68-year-old male presented to the emergency department for evaluation of chest pain and shortness of breath  On arrival the patient was awake, alert, oriented and in no acute distress  Initial vital signs show that the patient was hypertensive but otherwise vital signs were stable  Workup done in the emergency department showed the patient had a nonischemic EKG, negative troponin, elevated D-dimer and elevated BNP  Subsequent PE study negative for PE  There was a mild/moderate right pleural effusion  Patient also noted to have an MARANDA  The patient was treated with 40 of IV Lasix  On re-evaluation patient noted some improvement of his symptoms  The patient will be admitted to the hospital for further treatment and evaluation        Disposition  Final diagnoses:   MARANDA (acute kidney injury) ([facility] [address] )   Recurrent right pleural effusion   Chest pain     Time reflects when diagnosis was documented in both MDM as applicable and the Disposition within this note     Time User Action Codes Description Comment    7/23/2021 11:18 PM Paticia Gave Add [N17 9] MARANDA (acute kidney injury) ([facility] [address] )     7/23/2021 11:18 PM Paticia Gave Add [J90] Recurrent right pleural effusion     7/23/2021 11:18 PM Paticia Gave Add [R07 9] Chest pain       ED Disposition     ED Disposition Condition Date/Time Comment    Admit Stable Fri Jul 23, 2021 11:18 PM Case was discussed with VIKTORIA and the patient's admission status was agreed to be Admission Status: inpatient status to the service of Dr Enrico Medina   Follow-up Information    None         Patient's Medications   Discharge Prescriptions    No medications on file     No discharge procedures on file  PDMP Review       Value Time User    PDMP Reviewed  Yes 4/15/2021 10:19 AM Shukri Hernandez           ED Provider  Attending physically available and evaluated Ginette Galeana Sr    I managed the patient along with the ED Attending      Electronically Signed by         Mukund Wright MD  07/23/21 9085

## 2021-07-24 NOTE — ASSESSMENT & PLAN NOTE
· Presented to ED for chest pain and SOB   · Negative troponin, elevated D-dimer and elevated BNP  · CTA PE study: "Stable right lung volume loss secondary to areas of rounded atelectasis at the right middle and lower lobes, Small/moderate size right pleural effusion"  · Known lung lesions s/p multiple doses of radiation and h/o pleurocentesis  · Continue SL nitro PRN

## 2021-07-25 VITALS
TEMPERATURE: 97.7 F | OXYGEN SATURATION: 93 % | DIASTOLIC BLOOD PRESSURE: 47 MMHG | RESPIRATION RATE: 17 BRPM | BODY MASS INDEX: 33.2 KG/M2 | WEIGHT: 244.8 LBS | HEART RATE: 88 BPM | SYSTOLIC BLOOD PRESSURE: 106 MMHG

## 2021-07-25 PROBLEM — R07.89 ATYPICAL CHEST PAIN: Status: RESOLVED | Noted: 2019-06-14 | Resolved: 2021-07-25

## 2021-07-25 LAB
ANION GAP SERPL CALCULATED.3IONS-SCNC: 3 MMOL/L (ref 4–13)
BUN SERPL-MCNC: 24 MG/DL (ref 5–25)
CALCIUM SERPL-MCNC: 8.7 MG/DL (ref 8.3–10.1)
CHLORIDE SERPL-SCNC: 96 MMOL/L (ref 100–108)
CO2 SERPL-SCNC: 36 MMOL/L (ref 21–32)
CREAT SERPL-MCNC: 1.31 MG/DL (ref 0.6–1.3)
GFR SERPL CREATININE-BSD FRML MDRD: 54 ML/MIN/1.73SQ M
GLUCOSE SERPL-MCNC: 188 MG/DL (ref 65–140)
GLUCOSE SERPL-MCNC: 262 MG/DL (ref 65–140)
GLUCOSE SERPL-MCNC: 331 MG/DL (ref 65–140)
POTASSIUM SERPL-SCNC: 4.2 MMOL/L (ref 3.5–5.3)
SODIUM SERPL-SCNC: 135 MMOL/L (ref 136–145)

## 2021-07-25 PROCEDURE — 94640 AIRWAY INHALATION TREATMENT: CPT

## 2021-07-25 PROCEDURE — 94760 N-INVAS EAR/PLS OXIMETRY 1: CPT

## 2021-07-25 PROCEDURE — 99239 HOSP IP/OBS DSCHRG MGMT >30: CPT | Performed by: NURSE PRACTITIONER

## 2021-07-25 PROCEDURE — 80048 BASIC METABOLIC PNL TOTAL CA: CPT | Performed by: NURSE PRACTITIONER

## 2021-07-25 PROCEDURE — 82948 REAGENT STRIP/BLOOD GLUCOSE: CPT

## 2021-07-25 RX ORDER — FUROSEMIDE 10 MG/ML
40 INJECTION INTRAMUSCULAR; INTRAVENOUS ONCE
Status: COMPLETED | OUTPATIENT
Start: 2021-07-25 | End: 2021-07-25

## 2021-07-25 RX ADMIN — TIOTROPIUM BROMIDE 18 MCG: 18 CAPSULE ORAL; RESPIRATORY (INHALATION) at 08:32

## 2021-07-25 RX ADMIN — ISODIUM CHLORIDE 3 ML: 0.03 SOLUTION RESPIRATORY (INHALATION) at 08:21

## 2021-07-25 RX ADMIN — ASPIRIN 81 MG: 81 TABLET, COATED ORAL at 08:32

## 2021-07-25 RX ADMIN — METOPROLOL TARTRATE 25 MG: 25 TABLET, FILM COATED ORAL at 08:32

## 2021-07-25 RX ADMIN — INSULIN LISPRO 2 UNITS: 100 INJECTION, SOLUTION INTRAVENOUS; SUBCUTANEOUS at 08:31

## 2021-07-25 RX ADMIN — PANTOPRAZOLE SODIUM 40 MG: 40 TABLET, DELAYED RELEASE ORAL at 05:45

## 2021-07-25 RX ADMIN — LEVALBUTEROL HYDROCHLORIDE 1.25 MG: 1.25 SOLUTION, CONCENTRATE RESPIRATORY (INHALATION) at 08:21

## 2021-07-25 RX ADMIN — HEPARIN SODIUM 5000 UNITS: 5000 INJECTION INTRAVENOUS; SUBCUTANEOUS at 05:44

## 2021-07-25 RX ADMIN — INSULIN LISPRO 6 UNITS: 100 INJECTION, SOLUTION INTRAVENOUS; SUBCUTANEOUS at 11:12

## 2021-07-25 RX ADMIN — INSULIN HUMAN 45 UNITS: 500 INJECTION, SOLUTION SUBCUTANEOUS at 08:33

## 2021-07-25 RX ADMIN — FUROSEMIDE 40 MG: 10 INJECTION, SOLUTION INTRAVENOUS at 11:12

## 2021-07-25 RX ADMIN — CYCLOBENZAPRINE HYDROCHLORIDE 10 MG: 10 TABLET, FILM COATED ORAL at 08:32

## 2021-07-25 NOTE — DISCHARGE SUMMARY
[facility]  Discharge- Isaías Simpson Sr  1945, 70 y o  male MRN: [id number]  Unit/Bed#: CW2 212-01 Encounter: [id number]  Primary Care Provider: Manoj Lagunas MD   Date and time admitted to hospital: 7/23/2021  8:08 PM    * Atypical chest pain  Assessment & Plan  · Presented to ED for chest pain and SOB   · Negative troponin, elevated D-dimer and elevated BNP  · CTA PE study: "Stable right lung volume loss secondary to areas of rounded atelectasis at the right middle and lower lobes, Small/moderate size right pleural effusion"  · Known lung lesions s/p multiple doses of radiation and h/o pleurocentesis  · Continue SL nitro PRN      COPD, severe (HCC)  Assessment & Plan  · Not in exacerbation   · Continue Brio and Spiriva in place of home Trelegy  · PRN levalbuterol neb  · Non compliant with home CPAP HS,     Chronic diastolic heart failure (HCC)  Assessment & Plan  Wt Readings from Last 3 Encounters:   07/25/21 111 kg (244 lb 12 8 oz)   04/29/21 111 kg (245 lb)   04/19/21 108 kg (238 lb 8 6 oz)     · Continue Lopressor 25mg BID  · Currently on giving one time dosing of lasix iv     Primary adenocarcinoma of upper lobe of right lung (HCC)  Assessment & Plan  · Stg IA2 per chart review  · Per chart review pt follows with Dr Gilberto Mack, scheduled for PET-CT and CXR for assessment of potential malignant effusion  · On last admission for SOB pt had thoracentesis with bloody lymphocytic predominant effusion with negative cytology    · CTA PE study with "small/moderate size right pleural effusion"   · Pending pulmonary input     Recurrent right pleural effusion  Assessment & Plan  · Recurrent, s/p thoracentesis x2 in past   · Appreciate pulmonology   · - per pulm review of imaging pt has chronic right pleural effusion similar in size to PET -CT scan in may ,   · - There is no indication for thoracentesis as unlikely the cause of his shortness of breath   · - pt reporting he feels better sp one time dose of diuretic and improvement in renal function would would suggest some volume overload   · - recommend observe on current regimen and continue diuresis   · - ordered additional dose of diuretic now 40mg iv will chk renal function in the am   · chk labs in am renal function   · - pulmonary after discussion with patient request surgical consultation for pts ventral hernia discussed will have pt follow up with Dr April Villarrealpt for further evaluation   Will discuss with pt in the am   · Pt on 3 liters chronically at home   · Symptoms improved     Acute kidney injury superimposed on chronic kidney disease [facility])  Assessment & Plan  Lab Results   Component Value Date    EGFR 54 07/25/2021    EGFR 50 07/24/2021    EGFR 41 07/23/2021    CREATININE 1 31 (H) 07/25/2021    CREATININE 1 38 (H) 07/24/2021    CREATININE 1 64 (H) 07/23/2021     · Pt with improved SOB after trail of 40mg IV lasix  · Consider MARANDA in setting of fluid overload  · Continue Lasix 40mg PO BID    Type 2 diabetes mellitus with hyperglycemia, with long-term current use of insulin [facility])  Assessment & Plan  Lab Results   Component Value Date    HGBA1C 9 2 (H) 04/14/2021       Recent Labs     07/24/21  1106 07/24/21  1543 07/24/21  2123 07/25/21  0706   POCGLU 322* 244* 158* 188*       Blood Sugar Average: Last 72 hrs:  (P) 220   · Continue insulin U500 45 units BID and 35 units at bedtime  · Add SSI with bedtime coverage  · Avoid hypoglycemia    Dyslipidemia  Assessment & Plan  · Pravastatin 80mg, in place of home simvastatin    Obesity (BMI 30-39  9)  Assessment & Plan  · Life style modification discussed        Medical Problems     Resolved Problems  Date Reviewed: 7/25/2021    None              Discharging Physician / Practitioner: YOLANDE Luis  PCP: Nick Lala MD  Admission Date:   Admission Orders (From admission, onward)     Ordered        07/23/21 6313  Inpatient Admission  Once                   Discharge Date: 07/25/21    Consultations During Hospital Stay:  · Dr Enrrique Foote pulmonary     Procedures Performed:   · Creatinine on admission 1 64 at discharge after diuresis 1 31  · Chest xray 7/24: Increased opacification right hemithorax likely related to effusion and volume loss  · CTA chest PE study 7/23: No evidence of pulmonary embolus  Stable right lung volume loss secondary to areas of rounded atelectasis at the right middle and lower lobes  Small/moderate size right pleural effusion  Significant Findings / Test Results:   · See above     Incidental Findings:   · none    Test Results Pending at Discharge (will require follow up): · None      Outpatient Tests Requested:  · Follow up with pcp in one week   ·     Complications:  none    Reason for Admission:     Hospital Course:   Ginette Galeana Sr  is a 70 y o  male patient who originally presented to the hospital on 7/23/2021 due to chest pain and shortness of breath  Patient has past medical history significant for CHF, COPD, CAD, type 2 diabetes on high-dose insulin, adeno carcinoma of the right upper lung status post apparent radiation  Patient reported onset of chest pain approximately 1:00 a m  the afternoon of presentation he described it is central nonradiating associated with shortness of Breath but not worsening with exertion and without clearing alleviating his aspiration factors  He denied any nausea vomiting, diaphoresis fever chills or dizziness or lightheadedness  However upon arrival to the ED he reported resolution  He was found to have had a negative EKG unchanged from prior as CTA PE study was done which was negative for pulmonary embolism but did reveal recurrent small moderate right pleural effusion  Due to his age and history he was admitted for overnight observation and to have Pulmonary evaluate patient in the setting of right pleural effusion versus heart failure    Patient was noted to have a mild MARANDA upon arrival which improved with IV diuresis intermittent IV 40 mg Lasix 3 doses  And then patient was set to resume his home dosing of diuretics  Patient reports that he is compliant with his medication regimen  He was seen by Pulmonary patient noted to be on chronic 3 L of oxygen upon their review they felt that the size was similar to a prior PET scan done in May  There was no indication for thoracentesis at that time and they felt that this was unlikely cause of the shortness of breath  Patient denied any prior sick contacts at home and patient reportedly was being compliant with his inhalers  Recommendations were to continue diuresis  Patient did well overnight was ambulated with nursing the following morning without oxygen and Saturday at least 84% but averaged about 88% on room air  Of note patient does have home oxygen set up and is chronically on 3 L he was reminded that if walking specifically would require his oxygen  He reports he always wears his oxygen  No further will workup was recommended at this time and patient was able to be discharged home  Recommendations to follow-up with PCP within the next week and to call and schedule follow-up with his pulmonary doctors as previously scheduled  Please see above list of diagnoses and related plan for additional information  Condition at Discharge: fair    Discharge Day Visit / Exam:   Subjective:  Pt is doing better reports he likes to sleep   Has no pain but chronic pain in his back  He was able to walk and sats went to 84% on room air but pt wears chronic 3 liters oxygen once at rest able to recover quickly  Discussed wearing oxygen with ambulation  Follow up with with pulmonary      Vitals: Blood Pressure: (!) 106/47 (07/25/21 0809)  Pulse: 88 (07/25/21 0809)  Temperature: 97 7 °F (36 5 °C) (07/25/21 0809)  Temp Source: Oral (07/25/21 0809)  Respirations: 17 (07/25/21 0809)  Weight - Scale: 111 kg (244 lb 12 8 oz) (07/25/21 0600)  SpO2: 93 % (07/25/21 0123)  Exam:   Physical Exam  Constitutional:       General: He is not in acute distress  Appearance: He is obese  He is not ill-appearing or toxic-appearing  HENT:      Head: Normocephalic and atraumatic  Mouth/Throat:      Mouth: Mucous membranes are moist    Eyes:      General:         Right eye: No discharge  Left eye: No discharge  Conjunctiva/sclera: Conjunctivae normal    Cardiovascular:      Rate and Rhythm: Normal rate  Pulses: Normal pulses  Heart sounds: No murmur heard  No friction rub  No gallop  Pulmonary:      Effort: No respiratory distress  Breath sounds: No stridor  No wheezing, rhonchi or rales  Comments: Diminished right side   Chest:      Chest wall: No tenderness  Abdominal:      General: There is no distension  Palpations: There is no mass  Tenderness: There is no abdominal tenderness  There is no right CVA tenderness, left CVA tenderness, guarding or rebound  Hernia: No hernia is present  Musculoskeletal:         General: No swelling, tenderness, deformity or signs of injury  Right lower leg: No edema  Left lower leg: No edema  Skin:     Coloration: Skin is not jaundiced or pale  Findings: No bruising, erythema, lesion or rash  Comments: Severe bl lower extremity pvd    Neurological:      Mental Status: He is alert and oriented to person, place, and time  Psychiatric:         Behavior: Behavior normal          Discussion with Family: Attempted to update  (son) via phone  Unable to contact  Discharge instructions/Information to patient and family:   See after visit summary for information provided to patient and family  Provisions for Follow-Up Care:  See after visit summary for information related to follow-up care and any pertinent home health orders         Disposition:   Home    Planned Readmission: no plan for readmission      Discharge Statement:  I spent 45  minutes discharging the patient  This time was spent on the day of discharge  I had direct contact with the patient on the day of discharge  Greater than 50% of the total time was spent examining patient, answering all patient questions, arranging and discussing plan of care with patient as well as directly providing post-discharge instructions  Additional time then spent on discharge activities  Discharge Medications:  See after visit summary for reconciled discharge medications provided to patient and/or family        **Please Note: This note may have been constructed using a voice recognition system**

## 2021-07-25 NOTE — ASSESSMENT & PLAN NOTE
· Recurrent, s/p thoracentesis x2 in past   · Appreciate pulmonology   · - per pulm review of imaging pt has chronic right pleural effusion similar in size to PET -CT scan in may ,   · - There is no indication for thoracentesis as unlikely the cause of his shortness of breath   · - pt reporting he feels better sp one time dose of diuretic and improvement in renal function would would suggest some volume overload   · - recommend observe on current regimen and continue diuresis   · - ordered additional dose of diuretic now 40mg iv will chk renal function in the am   · chk labs in am renal function   · - pulmonary after discussion with patient request surgical consultation for pts ventral hernia discussed will have pt follow up with Dr Darcy Spence outpt for further evaluation   Discussed with the pt      · Pt on 3 liters chronically at home   · - ambulatory sats off of oxygen 84 % on 3 liters (home chronic oxygen ) 94%   · Symptoms improved

## 2021-07-25 NOTE — UTILIZATION REVIEW
Initial Clinical Review    Admission: Date/Time/Statement:   Admission Orders (From admission, onward)     Ordered        07/23/21 2319  Inpatient Admission  Once                   Orders Placed This Encounter   Procedures    Inpatient Admission     Standing Status:   Standing     Number of Occurrences:   1     Order Specific Question:   Level of Care     Answer:   Med Surg [16]     Order Specific Question:   Estimated length of stay     Answer:   More than 2 Midnights     Order Specific Question:   Certification     Answer:   I certify that inpatient services are medically necessary for this patient for a duration of greater than two midnights  See H&P and MD Progress Notes for additional information about the patient's course of treatment  ED Arrival Information     Expected Arrival Acuity    - 7/23/2021 18:26 Emergent         Means of arrival Escorted by Service Admission type    Wheelchair Self Hospitalist Emergency         Arrival complaint    heart racing        Chief Complaint   Patient presents with    Rapid Heart Rate     pt c/o rapid heart rate and chest pain since this morning  also reports sob and tremors   Chest Pain       Initial Presentation: 69 y/o male with hx of CHF, COPD, CAD, type 2 DM and adenocarcinoma or right upper lung s/p apparent radiation presents to [facility] ED via personal vehicle with c/o sudden onset of central, nonradiating chest pain and SOB worsened with exertion  He was found negative troponin EKG unchanged from prior; a CTA PE study initially was performed which was negative for pulmonary embolism but did reveal recurrent small- moderate right pleural effusion  D-dimer 1 42,   Given his history and age he is admitted for further evaluation of chest pain and shortness of breath possibly in setting of right pleural effusion versus heart failure   Admit Inpatient med surg,   Continue SL nitro prn, consult pulmonology to determine whether thoracentesis should be indicated/can be performed versus conservative management, recurrent s/p thoracentesis x2 in past, on last admission for SOB pt had thoracentesis with bloody lymphocytic predominant effusion with negative cytology  Consider CT surgery consult, consider MARANDA in setting of fluid overload, continue Lasix bid, continue home meds, SSI  Date: 7/24   Day 2: Per Hospitalist Note:  Pt is doing a little better than he did on admission chest pain was on the right lower quadrant area has since improved  He denies any cough but does use 3 liters of oxygen at home   He states he has little endurance due to back pain so cant walk far  Continue SL nitro prn, continue levalbuterol neb, give one time dose Lasix IV, continue home meds, pulmonary input, there is no indication for thoracentesis as unlikely the cause of his shortness of breath, pt reporting he feels better sp one time dose of diuretic and improvement in renal function would would suggest some volume overload  Recommend observe on current regimen and continue diuresis  Check renal function, continue SSI and home meds      ED Triage Vitals [07/23/21 1835]   Temperature Pulse Respirations Blood Pressure SpO2   99 °F (37 2 °C) 99 (!) 26 (!) 175/108 98 %      Temp Source Heart Rate Source Patient Position - Orthostatic VS BP Location FiO2 (%)   Tympanic Monitor Sitting Left arm --      Pain Score       4          Wt Readings from Last 1 Encounters:   07/25/21 111 kg (244 lb 12 8 oz)     Additional Vital Signs:   Date/Time  Temp  Pulse  Resp  BP  MAP (mmHg)  SpO2  Calculated FIO2 (%) - Nasal Cannula  Nasal Cannula O2 Flow Rate (L/min)  O2 Device   07/25/21 0823  --  --  --  --  --  93 %  32  3 L/min  Nasal cannula   07/25/21 08:09:55  97 7 °F (36 5 °C)  88  17  106/47Abnormal   67  96 %  32  3 L/min  Nasal cannula   07/24/21 23:28:06  97 7 °F (36 5 °C)  73  20  132/58  83  95 %  --  --  --   07/24/21 2310  --  --  --  --  --  95 %  28  2 L/min  Nasal cannula   07/24/21 19:36:59  --  73  --  132/59  83  97 %  32  3 L/min  Nasal cannula   07/24/21 15:42:56  98 2 °F (36 8 °C)  68  --  132/60  84  100 %  --  --  --   07/24/21 11:07:12  --  86  20  132/60  84  99 %  --  --  --   07/24/21 0851  --  --  --  --  --  98 %  36  4 L/min  Nasal cannula   07/24/21 06:51:14  97 9 °F (36 6 °C)  84  17  130/61  84  97 %  --  --  Nasal cannula   07/24/21 03:07:39  --  89  18  138/71  93  100 %  --  --  --   07/24/21 0009  --  --  --  --  --  98 %  28  2 L/min  Nasal cannula   07/23/21 2315  --  88  18  145/80  --  98 %  28  2 L/min  Nasal cannula   07/23/21 2245  --  86  --  145/80  106  99 %  --  --  --   07/23/21 2152  --  86  18  132/61  88  99 %  28  2 L/min  Nasal cannula   07/23/21 2015  --  70  18  162/77  111  93 %  28  2 L/min  Nasal cannula       Pertinent Labs/Diagnostic Test Results:   7/23 CXR:  Increased opacification right hemithorax likely related to effusion and volume loss  7/23 CTA CHEST PE STUDY:  No evidence of pulmonary embolus  Stable right lung volume loss secondary to areas of rounded atelectasis at the right middle and lower lobes  Small/moderate size right pleural effusion    7/23 EKG:  Normal sinus rhythm  Right bundle branch block    Results from last 7 days   Lab Units 07/24/21 0443 07/24/21  0019 07/23/21 2020   WBC Thousand/uL 7 10  --  8 45   HEMOGLOBIN g/dL 12 6  --  13 1   HEMATOCRIT % 41 3  --  42 7   PLATELETS Thousands/uL 124* 136* 144*   NEUTROS ABS Thousands/µL  --   --  6 82     Results from last 7 days   Lab Units 07/24/21 0443 07/23/21 2020   SODIUM mmol/L 136 133*   POTASSIUM mmol/L 3 8 4 3   CHLORIDE mmol/L 97* 95*   CO2 mmol/L 38* 34*   ANION GAP mmol/L 1* 4   BUN mg/dL 22 23   CREATININE mg/dL 1 38* 1 64*   EGFR ml/min/1 73sq m 50 41   CALCIUM mg/dL 9 0 9 3   MAGNESIUM mg/dL 2 3  --      Results from last 7 days   Lab Units 07/23/21 2020   AST U/L 13   ALT U/L 17   ALK PHOS U/L 97   TOTAL PROTEIN g/dL 8 6*   ALBUMIN g/dL 3 3*   TOTAL BILIRUBIN mg/dL 0 29     Results from last 7 days   Lab Units 07/25/21  0706 07/24/21  2123 07/24/21  1543 07/24/21  1106 07/24/21  0609 07/24/21  0017   POC GLUCOSE mg/dl 188* 158* 244* 322* 224* 184*     Results from last 7 days   Lab Units 07/24/21  0443 07/23/21 2020   GLUCOSE RANDOM mg/dL 228* 332*     BETA-HYDROXYBUTYRATE   Date Value Ref Range Status   06/15/2019 0 2 <0 6 mmol/L Final      Results from last 7 days   Lab Units 07/24/21  0443 07/24/21  0019 07/23/21 2020   TROPONIN I ng/mL <0 02 <0 02 <0 02     Results from last 7 days   Lab Units 07/23/21  2040   D-DIMER QUANTITATIVE ug/ml FEU 1 42*     Results from last 7 days   Lab Units 07/23/21 2020   NT-PRO BNP pg/mL 437*     ED Treatment:   Medication Administration from 07/23/2021 1826 to 07/24/2021 0007       Date/Time Order Dose Route Action     07/23/2021 2146 furosemide (LASIX) injection 40 mg 40 mg Intravenous Given     07/23/2021 2224 iohexol (OMNIPAQUE) 350 MG/ML injection (MULTI-DOSE) 100 mL 100 mL Intravenous Given        Past Medical History:   Diagnosis Date    Abdominal pain     Cardiac disease     [facility]est[facility]     [facility], [facility]     Coronary artery disease     DDD (degenerative disc disease), lumbar 9/1/2020    Diabetes mellitus ([facility] )     Dyspnea     GERD (gastroesophageal reflux disease)     Hyperlipidemia     Hypertension     MI [facility]myocardial infarction) [facility]     with 3 stents    Nodule of apex of right lung     JACQUELINE (obstructive sleep apnea)     Prostate cancer (HCC)      Present on Admission:   Atypical chest pain   Acute kidney injury superimposed on chronic kidney disease (HCC)   COPD, severe (HCC)   Chronic diastolic heart failure (HCC)   Dyslipidemia   Primary adenocarcinoma of upper lobe of right lung (HCC)   Obesity (BMI 30-39  9)   Recurrent right pleural effusion      Admitting Diagnosis: Chronic diastolic heart failure (HCC) [I50 32]  Chest pain [R07 9]  MARANDA (acute kidney injury) ([facility] ) [N17 9]  Recurrent right pleural effusion [J90]  Age/Sex: 70 y o  male  Admission Orders:  Scheduled Medications:  aspirin, 81 mg, Oral, Daily  cyclobenzaprine, 10 mg, Oral, TID  heparin (porcine), 5,000 Units, Subcutaneous, Q8H DRAKE  insulin lispro, 1-6 Units, Subcutaneous, HS  insulin lispro, 2-12 Units, Subcutaneous, TID AC  insulin regular, 35 Units, Subcutaneous, HS  insulin regular, 45 Units, Subcutaneous, BID AC  levalbuterol, 1 25 mg, Nebulization, TID  lidocaine, 1 patch, Topical, Daily  metoprolol tartrate, 25 mg, Oral, BID  pantoprazole, 40 mg, Oral, Early Morning  pravastatin, 80 mg, Oral, Daily With Dinner  sodium chloride, 3 mL, Nebulization, TID  tamsulosin, 0 4 mg, Oral, Daily With Dinner  tiotropium, 18 mcg, Inhalation, Daily      Continuous IV Infusions:     PRN Meds:  acetaminophen, 975 mg, Oral, Q6H PRN  nitroglycerin, 0 4 mg, Sublingual, Q5 Min PRN    IO, daily weights  scd  Bladder scan  SSI  telemetry  Cardiovascular diet, fluid restriction 1500 ml, carb diet    IP CONSULT TO CASE MANAGEMENT  IP CONSULT TO NUTRITION SERVICES  IP CONSULT TO PULMONOLOGY    Network Utilization Review Department  ATTENTION: Please call with any questions or concerns to [phone] and carefully listen to the prompts so that you are directed to the right person  All voicemails are confidential   Cloyde Havers all requests for admission clinical reviews, approved or denied determinations and any other requests to dedicated fax number below belonging to the campus where the patient is receiving treatment   List of dedicated fax numbers for the Facilities:  [address] DENIALS (Administrative/Medical Necessity) [phone]   [address] (Maternity/NICU/Pediatrics) [phone] [address] [place] [phone]     Maria Ines Roth [address] [place] [address] [address] [place] [address] [phone]

## 2021-07-25 NOTE — ASSESSMENT & PLAN NOTE
Lab Results   Component Value Date    EGFR 54 07/25/2021    EGFR 50 07/24/2021    EGFR 41 07/23/2021    CREATININE 1 31 (H) 07/25/2021    CREATININE 1 38 (H) 07/24/2021    CREATININE 1 64 (H) 07/23/2021     · Pt with improved SOB after trail of 40mg IV lasix  · Consider MARANDA in setting of fluid overload  · Continue Lasix 40mg PO BID

## 2021-07-25 NOTE — ASSESSMENT & PLAN NOTE
· Stg IA2 per chart review  · Per chart review pt follows with Dr Sabina Tan, scheduled for PET-CT and CXR for assessment of potential malignant effusion  · On last admission for SOB pt had thoracentesis with bloody lymphocytic predominant effusion with negative cytology    · CTA PE study with "small/moderate size right pleural effusion"   · Appreciate pulmonary no significant change in imaging and improved symptoms continue outpt fu

## 2021-07-25 NOTE — ASSESSMENT & PLAN NOTE
Lab Results   Component Value Date    HGBA1C 9 2 (H) 04/14/2021       Recent Labs     07/24/21  1106 07/24/21  1543 07/24/21  2123 07/25/21  0706   POCGLU 322* 244* 158* 188*       Blood Sugar Average: Last 72 hrs:  (P) 220   · Continue insulin U500 45 units BID and 35 units at bedtime  · Add SSI with bedtime coverage  · Avoid hypoglycemia

## 2021-07-25 NOTE — ASSESSMENT & PLAN NOTE
Wt Readings from Last 3 Encounters:   07/25/21 111 kg (244 lb 12 8 oz)   04/29/21 111 kg (245 lb)   04/19/21 108 kg (238 lb 8 6 oz)     · Continue Lopressor 25mg BID  · Improved after one time dose of lasix with improvement in creatinine , received 40mg last night and again renal function improved   · Will resume home dosing pt ok for dc would recommend bmp on Thursday with results to pcp

## 2021-07-26 NOTE — UTILIZATION REVIEW
Notification of Discharge   This is a Notification of Discharge from our facility [address]  Please be advised that this patient has been discharge from our facility  Below you will find the admission and discharge date and time including the patients disposition  UTILIZATION REVIEW CONTACT:  [employer]  Utilization   Network Utilization Review Department  Phone: [phone] x carefully listen to the prompts  All voicemails are confidential   Email: [email or web address]     PHYSICIAN ADVISORY SERVICES:  FOR [id number] REVIEW - MEDICAL NECESSITY DENIAL  Phone: [phone]  Fax: [phone]  Email: [email or web address]     PRESENTATION DATE: 7/23/2021  8:08 PM  OBERVATION ADMISSION DATE:   INPATIENT ADMISSION DATE: 7/23/21 11:19 PM   DISCHARGE DATE: 7/25/2021  4:00 PM  DISPOSITION: Home/Self Care Home/Self Care      IMPORTANT INFORMATION:  Send all requests for admission clinical reviews, approved or denied determinations and any other requests to dedicated fax number below belonging to the campus where the patient is receiving treatment   List of dedicated fax numbers:  [address] DENIALS (Administrative/Medical Necessity) [phone]   [address] (Maternity/NICU/Pediatrics) [phone]   Mil Berman [phone]   Susannah Sheikh [phone]   Senthil Yousif [phone]   [facility] [address] [place] [address] [phone]   [facility]  [phone]   [address] [facility]  [address] [facility] [address] [facility] [phone]

## 2021-07-27 ENCOUNTER — TRANSITIONAL CARE MANAGEMENT (OUTPATIENT)
Dept: FAMILY MEDICINE CLINIC | Facility: CLINIC | Age: 76
End: 2021-07-27

## 2021-08-09 DIAGNOSIS — M54.50 CHRONIC BILATERAL LOW BACK PAIN, UNSPECIFIED WHETHER SCIATICA PRESENT: ICD-10-CM

## 2021-08-09 DIAGNOSIS — Z79.4 TYPE 2 DIABETES MELLITUS WITH COMPLICATION, WITH LONG-TERM CURRENT USE OF INSULIN (HCC): ICD-10-CM

## 2021-08-09 DIAGNOSIS — J96.12 CHRONIC RESPIRATORY FAILURE WITH HYPOXIA AND HYPERCAPNIA (HCC): Chronic | ICD-10-CM

## 2021-08-09 DIAGNOSIS — J96.11 CHRONIC RESPIRATORY FAILURE WITH HYPOXIA AND HYPERCAPNIA (HCC): Chronic | ICD-10-CM

## 2021-08-09 DIAGNOSIS — G89.29 CHRONIC BILATERAL LOW BACK PAIN, UNSPECIFIED WHETHER SCIATICA PRESENT: ICD-10-CM

## 2021-08-09 DIAGNOSIS — E11.8 TYPE 2 DIABETES MELLITUS WITH COMPLICATION, WITH LONG-TERM CURRENT USE OF INSULIN (HCC): ICD-10-CM

## 2021-08-09 RX ORDER — POTASSIUM CHLORIDE 20 MEQ/1
TABLET, EXTENDED RELEASE ORAL
Qty: 60 TABLET | Refills: 2 | Status: ON HOLD | OUTPATIENT
Start: 2021-08-09 | End: 2022-01-30 | Stop reason: SDUPTHER

## 2021-08-09 RX ORDER — CYCLOBENZAPRINE HCL 10 MG
TABLET ORAL
Qty: 60 TABLET | Refills: 0 | Status: SHIPPED | OUTPATIENT
Start: 2021-08-09 | End: 2021-10-13

## 2021-08-09 RX ORDER — INSULIN HUMAN 500 [IU]/ML
INJECTION, SOLUTION SUBCUTANEOUS
Qty: 40 ML | Refills: 3 | Status: SHIPPED | OUTPATIENT
Start: 2021-08-09 | End: 2022-05-17 | Stop reason: SDUPTHER

## 2021-08-30 DIAGNOSIS — R35.0 URINARY FREQUENCY: ICD-10-CM

## 2021-08-30 DIAGNOSIS — I25.10 CORONARY ARTERY DISEASE INVOLVING NATIVE CORONARY ARTERY OF NATIVE HEART WITHOUT ANGINA PECTORIS: ICD-10-CM

## 2021-08-30 RX ORDER — TAMSULOSIN HYDROCHLORIDE 0.4 MG/1
CAPSULE ORAL
Qty: 60 CAPSULE | Refills: 2 | Status: SHIPPED | OUTPATIENT
Start: 2021-08-30 | End: 2022-01-30 | Stop reason: HOSPADM

## 2021-09-14 ENCOUNTER — RADIATION ONCOLOGY FOLLOW-UP (OUTPATIENT)
Dept: RADIATION ONCOLOGY | Facility: HOSPITAL | Age: 76
End: 2021-09-14
Attending: RADIOLOGY
Payer: COMMERCIAL

## 2021-09-14 VITALS
SYSTOLIC BLOOD PRESSURE: 118 MMHG | DIASTOLIC BLOOD PRESSURE: 68 MMHG | RESPIRATION RATE: 17 BRPM | HEART RATE: 90 BPM | TEMPERATURE: 97.7 F

## 2021-09-14 DIAGNOSIS — C34.11 PRIMARY ADENOCARCINOMA OF UPPER LOBE OF RIGHT LUNG (HCC): Primary | ICD-10-CM

## 2021-09-14 PROCEDURE — 99213 OFFICE O/P EST LOW 20 MIN: CPT | Performed by: RADIOLOGY

## 2021-09-14 PROCEDURE — 99211 OFF/OP EST MAY X REQ PHY/QHP: CPT | Performed by: RADIOLOGY

## 2021-09-14 NOTE — PROGRESS NOTES
Follow-up - Radiation Oncology   Edmond Jimenes Sr  1945 78 y o  male [id number]      History of Present Illness   Cancer Staging  Primary adenocarcinoma of upper lobe of right lung [facility])  Staging form: Lung, AJCC 8th Edition  - Clinical stage from 11/20/2020: Stage IA2 (cT1b, cN0, cM0) - Signed by Juliane Wu MD on 11/21/2020      Edmond Jimenes Sr  is a 78y o  year old male with a history of a 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  He completed SBRT to the R[facility] 12/31/20  He had a telemedicine visit 1/29/21  He returns today for follow-up  He also has a h/o prostate adenocarcinoma treated with prostate seed brachytherapy followed by pelvic radiation in 2007  Interval History:   4/14/21-4/19/21 admitted to the hospital with COPD exacerbation, right recurrent pleural effusion  He was given IV steroids and had a thoracentesis (fluid negative for malignancy)  D/c on prednisone taper     4/29/21 Sosa Lugo MD Pulmonology  For his history of lung adenocarcinoma, patient is scheduled to undergo a PET-CT scan in approximately 1 week  Keri Forget follow up with this for possible malignant effusion   If the pleural fluid is concerning, would consider referral to thoracic surgery for VATS decortication      5/27/21 PET  1  Helena Pizarro is now patchy radiotracer uptake in the right upper lobe at the site of the previously seen pulmonary lesion likely related to posttreatment changes     2   No new findings suspicious for hypermetabolic metastasis      46/15/22 - 07/25/21 pt admitted to hospital for chest pain  Pt was given one time dosing of lasix IV      07/23/21 - CTA ed chest pe study  IMPRESSION:  No evidence of pulmonary embolus  Stable right lung volume loss secondary to areas of rounded atelectasis at the right middle and lower lobes  Small/moderate size right pleural effusion      He returns today for follow-up visit  He reports his respiratory status is stable    He remains on home oxygen at 3L  He denies any significant cough and also has no hemoptysis  He is on Lasix daily which is controlling his lower extremity edema  He has had no chest pain since he was admitted in late July  Historical Information   Oncology History   Primary adenocarcinoma of upper lobe of right lung ([facility] [address] )   11/3/2020 Initial Diagnosis    Primary adenocarcinoma of upper lobe of right lung ([facility] )     11/3/2020 Biopsy    Final Diagnosis   A  Lung, Right Upper Lobe, Mass:  - Non small cell carcinoma with morphologic features and immunophenotype compatible with adenocarcinoma of lung   - See note  11/20/2020 -  Cancer Staged    Staging form: Lung, AJCC 8th Edition  - Clinical stage from 11/20/2020: Stage IA2 (cT1b, cN0, cM0) - Signed by Remington Mahmood MD on 11/21/2020 12/18/2020 - 12/31/2020 Radiation    SBRT RUL         Past Medical History:   Diagnosis Date    Abdominal pain     Cardiac disease     CHF (congestive heart failure) (HCC)     COPD, severe (HCC)     Coronary artery disease     DDD (degenerative disc disease), lumbar 9/1/2020    Diabetes mellitus ([facility] )     Dyspnea     GERD (gastroesophageal reflux disease)     Hyperlipidemia     Hypertension     MI (myocardial infarction) ([facility] )     with 3 stents    Nodule of apex of right lung     JACQUELINE (obstructive sleep apnea)     Prostate cancer [facility])      Past Surgical History:   Procedure Laterality Date    ABDOMINAL SURGERY      exploratory    ANGIOPLASTY      3 stents    APPENDECTOMY      COLONOSCOPY  2015    CT NEEDLE BIOPSY LUNG  11/3/2020    ESOPHAGOGASTRODUODENOSCOPY N/A 10/2/2017    Procedure: ESOPHAGOGASTRODUODENOSCOPY (EGD); Surgeon: Fernie Mendoza MD;  Location: [facility];   Service: Gastroenterology    IR THORACENTESIS  11/3/2020    KNEE CARTILAGE SURGERY      OTHER SURGICAL HISTORY      stent placement    PROSTATE SURGERY      SKIN GRAFT      Basal cell CA back     Social History   Social History     Substance and Sexual Activity   Alcohol Use Not Currently     Social History     Substance and Sexual Activity   Drug Use No     Social History     Tobacco Use   Smoking Status Former Smoker    Packs/day: 1 50    Years: 50 00    Pack years: 75 00    Start date: 38    Quit date: 2020    Years since quittin 1   Smokeless Tobacco Never Used     Meds/Allergies     Current Outpatient Medications:     aspirin (ECOTRIN LOW STRENGTH) 81 mg EC tablet, Take 1 tablet by mouth daily, Disp: , Rfl:     cyclobenzaprine (FLEXERIL) 10 mg tablet, TAKE ONE TABLET BY MOUTH THREE TIMES A DAY, Disp: 60 tablet, Rfl: 0    fluticasone-umeclidinium-vilanterol (Trelegy Ellipta) 100-62 5-25 MCG/INH inhaler, Inhale 1 puff daily Rinse mouth after use , Disp: 60 each, Rfl: 3    furosemide (LASIX) 40 mg tablet, TAKE ONE TABLET BY MOUTH TWICE A DAY, Disp: 60 tablet, Rfl: 4    HUMULIN R 500 UNIT/ML CONCENTRATED injection, INJECT 45 UNITS TWICE DAILY BEFORE BREAKFAST AND LUNCH, Disp: 40 mL, Rfl: 3    Insulin Syringe/Needle U-500 (BD Insulin Syringe U-500) 31G X 6MM 0 5 ML MISC, USE 1 SYRINGE THREE TIMES A DAY FOR INJECTING INSULIN, Disp: 100 each, Rfl: 5    levalbuterol (XOPENEX) 1 25 mg/3 mL nebulizer solution, USE 1 VIAL IN NEBULIZER THREE TIMES A DAY, Disp: , Rfl: 5    metoprolol tartrate (LOPRESSOR) 25 mg tablet, TAKE ONE TABLET BY MOUTH TWICE A DAY, Disp: 60 tablet, Rfl: 5    nitroglycerin (NITROSTAT) 0 4 mg SL tablet, Place 1 tablet (0 4 mg total) under the tongue every 5 (five) minutes as needed for chest pain, Disp: 30 tablet, Rfl: 5    Omega-3 Fatty Acids (FISH OIL) 645 MG CAPS, Take 1 capsule by mouth 2 (two) times a day, Disp: , Rfl:     OneTouch Ultra test strip, TEST FOUR TIMES A DAY, Disp: 150 strip, Rfl: 5    pantoprazole (PROTONIX) 40 mg tablet, TAKE ONE TABLET BY MOUTH EVERY DAY, Disp: 60 tablet, Rfl: 2    potassium chloride (K-DUR,KLOR-CON) 20 mEq tablet, TAKE ONE TABLET BY MOUTH EVERY DAY, Disp: 60 tablet, Rfl: 2   simvastatin (ZOCOR) 40 mg tablet, TAKE ONE TABLET BY MOUTH EVERY DAY, Disp: 60 tablet, Rfl: 2    traMADol (ULTRAM) 50 mg tablet, tramadol 50 mg tablet, Disp: , Rfl:     ketoconazole (NIZORAL) 2 % cream, ketoconazole 2 % topical cream (Patient not taking: Reported on 9/14/2021), Disp: , Rfl:     oxyCODONE-acetaminophen (PERCOCET) 7 5-325 MG per tablet, Take by mouth every 4 (four) hours (Patient not taking: Reported on 9/14/2021), Disp: , Rfl:     tamsulosin (FLOMAX) 0 4 mg, TAKE ONE CAPSULE BY MOUTH EVERY DAY (Patient not taking: Reported on 9/14/2021), Disp: 60 capsule, Rfl: 2  Allergies   Allergen Reactions    Crestor [Rosuvastatin]     Metformin GI Intolerance     Review of Systems  Constitutional: Negative  HENT: Positive for hearing loss  Eyes: Positive for visual disturbance  Respiratory: Positive for chest tightness and shortness of breath  Cardiovascular: Positive for chest pain  Gastrointestinal: Negative  Pt has hernia   Endocrine: Negative  Genitourinary: Positive for frequency  Musculoskeletal: Positive for arthralgias, back pain and joint swelling  Skin: Negative  Allergic/Immunologic: Negative  Neurological: Positive for dizziness and weakness  Hematological: Bruises/bleeds easily  Psychiatric/Behavioral: Negative  OBJECTIVE:   /68 (BP Location: Left arm, Patient Position: Sitting, Cuff Size: Large)   Pulse 90   Temp 97 7 °F (36 5 °C) (Temporal)   Resp 17   Pain Assessment:  8 - chronic lower back pain  ECOG/Zubrod/WHO: 2 - Symptomatic, <50% confined to bed    Physical Exam   Vitals signs and nursing note reviewed  Constitutional:       General: He is not in acute distress  Appearance: He is well-developed  He is not diaphoretic  Comments: He is wearing oxygen 4 L via nasal cannula  HENT:      Head: Normocephalic and atraumatic  Mouth/Throat:      Pharynx: No oropharyngeal exudate  Eyes:      General: No scleral icterus  Conjunctiva/sclera: Conjunctivae normal       Pupils: Pupils are equal, round, and reactive to light  Neck:      Musculoskeletal: Normal range of motion and neck supple  Thyroid: No thyromegaly  Trachea: No tracheal deviation  Cardiovascular:      Rate and Rhythm: Normal rate and regular rhythm  Heart sounds: Normal heart sounds  Pulmonary:      Effort: Pulmonary effort is normal  No respiratory distress  Breath sounds: Normal breath sounds  No wheezing or rales  Chest:      Chest wall: No tenderness  Abdominal:      General: Bowel sounds are normal  There is no distension  Palpations: Abdomen is soft  There is no mass  Tenderness: There is no abdominal tenderness  Musculoskeletal: Normal range of motion  General: No swelling or tenderness  Comments: He is seen in a wheelchair  Lymphadenopathy:      Cervical: No cervical adenopathy  Skin:     General: Skin is warm and dry  Coloration: Skin is not pale  Findings: No erythema or rash  Neurological:      General: No focal deficit present  Mental Status: He is alert and oriented to person, place, and time  Cranial Nerves: No cranial nerve deficit  Coordination: Coordination normal    Psychiatric:         Mood and Affect: Mood normal          Behavior: Behavior normal          Thought Content: Thought content normal          Judgment: Judgment normal       RESULTS    Lab Results: No results found for this or any previous visit (from the past 672 hour(s))      Imaging Studies: See Above    Assessment/Plan:  Orders Placed This Encounter   Procedures    CT chest wo contrast        Jeronimo Sloan Sr  is a 78y o  year old male with a stage IA 2 right 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 with a history of smoking tobacco for 55 years who recently quit smoking   His workup included a PET-CT study that revealed increase uptake in the right upper lobe lung nodule that is biopsy positive for adenocarcinoma on November 3, 2020  There was no evidence of other hypermetabolic lesions within the chest and no hilar and no mediastinal lymphadenopathy  Rudolph Dodrill was also no disease noted in the neck, abdomen, and pelvis   He has a history of prostate adenocarcinoma treated with prostate seed brachytherapy followed by pelvic radiation therapy performed under my direction at [facility] in August 2007  He remains MAURI from his prostate Stacy Medina has multiple medical problems including COPD, CHF, status post myocardial infarction with a stent, and is wheelchair dependent with very marginal lung function such that he is not a surgical candidate   We recommended definitive radiation therapy using stereotactic body radiotherapy to treat his stage IA2 right upper lobe lung adenocarcinoma   He completed treatment at [facility] on December 31, 2020        He returns today for follow-up visit  He reports his respiratory status is stable  He remains on home oxygen at 3L  He denies any significant cough and also has no hemoptysis  We recommended a repeat PET-CT study be done 3 months after the completion treatment to assess response  This was scheduled for March 31, 2021 and then he reschedule the PET scan to May 27, 2021  This revealed post treatment changes in the right upper lobe without any evidence of disease  There was no evidence of any metastatic disease  Results were reviewed with him today  He had CTA study on July 23, 2021 revealed no evidence of any disease  There was a small to moderate size right pleural effusion  He will have a repeat chest CT performed in late January 2022 and then return here for follow-up thereafter        Remington Mahmood MD  7/23/7775,8:08 PM    Portions of the record may have been created with voice recognition software   Occasional wrong word or "sound a like" substitutions may have occurred due to the inherent limitations of voice recognition software   Read the chart carefully and recognize, using context, where substitutions have occurred

## 2021-09-14 NOTE — PROGRESS NOTES
Murtaza Lawrence Sr  1945 is a 78 y o  male  with a h/o stage IA 2 right 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  He completed SBRT to the [facility] 12/31/20  He had a telemedicine visit 1/29/21  He returns today for follow-up  He also has a h/o prostate adenocarcinoma treated with prostate seed brachytherapy followed by pelvic radiation in 2007 4/14/21-4/19/21 admitted to the hospital with COPD exacerbation, right recurrent pleural effusion  He was given IV steroids and had a thoracentesis (fluid negative for malignancy)  D/c on prednisone taper    4/29/21 Dana Aguilar MD Pulmonology  For his history of lung adenocarcinoma, patient is scheduled to undergo a PET-CT scan in approximately 1 week  Will follow up with this for possible malignant effusion  If the pleural fluid is concerning, would consider referral to thoracic surgery for VATS decortication  5/27/21 PET  1  There is now patchy radiotracer uptake in the right upper lobe at the site of the previously seen pulmonary lesion likely related to posttreatment changes  2   No new findings suspicious for hypermetabolic metastasis  07/23/21 - 07/25/21 pt admitted to hospital for chest pain  Pt was given one time dosing of lasix IV       07/23/21 - CTA ed chest pe study  IMPRESSION:  No evidence of pulmonary embolus  Stable right lung volume loss secondary to areas of rounded atelectasis at the right middle and lower lobes  Small/moderate size right pleural effusion  Follow up visit       Oncology History   Primary adenocarcinoma of upper lobe of right lung ([address] )   11/3/2020 Initial Diagnosis    Primary adenocarcinoma of upper lobe of right lung ([address] )     11/3/2020 Biopsy    Final Diagnosis   A  Lung, Right Upper Lobe, Mass:  - Non small cell carcinoma with morphologic features and immunophenotype compatible with adenocarcinoma of lung   - See note          11/20/2020 -  Cancer Staged    Staging form: Lung, AJCC 8th Edition  - Clinical stage from 11/20/2020: Stage IA2 (cT1b, cN0, cM0) - Signed by Galina Diaz MD on 11/21/2020 12/18/2020 - 12/31/2020 Radiation    SBRT R[facility]         Clinical Trial: no      Health Maintenance   Topic Date Due    Hepatitis C Screening  Never done   [employer] Annual Wellness Visit (AWV)  Never done    COVID-19 Vaccine (1) Never done    Colorectal Cancer Screening  12/22/2017    DM Eye Exam  01/19/2018    URINE MICROALBUMIN  06/15/2020    Influenza Vaccine (1) 09/01/2021    HEMOGLOBIN A1C  10/14/2021    Fall Risk  11/20/2021    BMI: Followup Plan  03/12/2022    DTaP,Tdap,and Td Vaccines (1 - Tdap) 03/11/2022 (Originally 9/7/1966)    Diabetic Foot Exam  03/12/2022    BMI: Adult  07/25/2022    Depression Screening  09/14/2022    Pneumococcal Vaccine: 65+ Years  Completed    HIB Vaccine  Aged Out    Hepatitis B Vaccine  Aged Out    IPV Vaccine  Aged Out    Hepatitis A Vaccine  Aged Out    Meningococcal ACWY Vaccine  Aged Out    HPV Vaccine  Aged Out       Patient Active Problem List   Diagnosis    Obesity (BMI 30-39  9)    Dyslipidemia    Type 2 diabetes mellitus with hyperglycemia, with long-term current use of insulin (HCC)    Venous stasis dermatitis of both lower extremities    Chronic respiratory failure with hypoxia and hypercapnia (HCC)    COPD, severe (HCC)    Acute kidney injury superimposed on chronic kidney disease (HCC)    Recurrent right pleural effusion    Chronic diastolic heart failure (HCC)    Essential hypertension    Diabetic neuropathy (HCC)    CAD (coronary artery disease)    Hyponatremia    RBBB    Oxygen dependent    COPD with acute exacerbation (HCC)    Atypical chest pain    Pulmonary hypertension ([address] )    JACQUELINE (obstructive sleep apnea)    Lung nodule    DDD (degenerative disc disease), lumbar    Primary adenocarcinoma of upper lobe of right lung (HCC)    Obesity, morbid ([address] )    PAD (peripheral artery disease) ([address] )    Chronic respiratory failure with hypoxia (HCC)    Hyperkalemia     Past Medical History:   Diagnosis Date    Abdominal pain     Cardiac disease     CHF (congestive heart failure) (HCC)     COPD, severe (HCC)     Coronary artery disease     DDD (degenerative disc disease), lumbar 2020    Diabetes mellitus ([facility] )     Dyspnea     GERD (gastroesophageal reflux disease)     Hyperlipidemia     Hypertension     MI (myocardial infarction) ([facility] )     with 3 stents    Nodule of apex of right lung     JACQUELINE (obstructive sleep apnea)     Prostate cancer [facility])      Past Surgical History:   Procedure Laterality Date    ABDOMINAL SURGERY      exploratory    ANGIOPLASTY      3 stents    APPENDECTOMY      COLONOSCOPY      CT NEEDLE BIOPSY LUNG  11/3/2020    ESOPHAGOGASTRODUODENOSCOPY N/A 10/2/2017    Procedure: ESOPHAGOGASTRODUODENOSCOPY (EGD); Surgeon: Kirsten Denton MD;  Location: [facility];   Service: Gastroenterology    IR THORACENTESIS  11/3/2020    KNEE CARTILAGE SURGERY      OTHER SURGICAL HISTORY      stent placement    PROSTATE SURGERY      SKIN GRAFT      Basal cell CA back     Family History   Problem Relation Age of Onset    Heart disease Father     Other Father         Mesothelioma      Social History     Socioeconomic History    Marital status: /Civil Union     Spouse name: Not on file    Number of children: 1    Years of education: 6    Highest education level: Not on file   Occupational History    Not on file   Tobacco Use    Smoking status: Former Smoker     Packs/day: 1 50     Years: 50 00     Pack years: 75 00     Start date: 38     Quit date: 2020     Years since quittin 1    Smokeless tobacco: Never Used   Vaping Use    Vaping Use: Never used   Substance and Sexual Activity    Alcohol use: Not Currently    Drug use: No    Sexual activity: Never     Partners: Female   Other Topics Concern    Not on file   Social History Narrative    Most recent tobacco use screenin2018    Do you currently or have you served in the [employer]: No    Were you activated, into active duty, as a member of the [employer] or as a Reservist: No    Caffeine intake: Moderate    Alcohol intake: None    Marital status:     Number of children: 1    Education: 8    Occupation: retired    Sexual orientation: Heterosexual    General stress level: Medium    Live alone or with others: with others    Exercise level: None    Sexually active: No    Overweight: Yes    Obese: Yes    Guns present in home: No    Seat belts used routinely: Yes    Advance directive: No    Sunscreen used routinely: No    Smoke alarm in home: Yes    Legally blind in one or both eyes: No    Hard of hearing or deaf in one or both ears: No     Social Determinants of Health     Financial Resource Strain:     Difficulty of Paying Living Expenses:    Food Insecurity:     Worried About Running Out of Food in the Last Year:     [address] in the Last Year:    Transportation Needs:     Lack of Transportation (Medical):      Lack of Transportation (Non-Medical):    Physical Activity:     Days of Exercise per Week:     Minutes of Exercise per Session:    Stress:     Feeling of Stress :    Social Connections:     Frequency of Communication with Friends and Family:     Frequency of Social Gatherings with Friends and Family:     Attends Muslim Services:     Active Member of Clubs or Organizations:     Attends Club or Organization Meetings:     Marital Status:    Intimate Partner Violence:     Fear of Current or Ex-Partner:     Emotionally Abused:     Physically Abused:     Sexually Abused:        Current Outpatient Medications:     aspirin (ECOTRIN LOW STRENGTH) 81 mg EC tablet, Take 1 tablet by mouth daily, Disp: , Rfl:     cyclobenzaprine (FLEXERIL) 10 mg tablet, TAKE ONE TABLET BY MOUTH THREE TIMES A DAY, Disp: 60 tablet, Rfl: 0    fluticasone-umeclidinium-vilanterol (Trelegy Ellipta) 100-62 5-25 MCG/INH inhaler, Inhale 1 puff daily Rinse mouth after use , Disp: 60 each, Rfl: 3    furosemide (LASIX) 40 mg tablet, TAKE ONE TABLET BY MOUTH TWICE A DAY, Disp: 60 tablet, Rfl: 4    HUMULIN R 500 UNIT/ML CONCENTRATED injection, INJECT 45 UNITS TWICE DAILY BEFORE BREAKFAST AND LUNCH, Disp: 40 mL, Rfl: 3    Insulin Syringe/Needle U-500 (BD Insulin Syringe U-500) 31G X 6MM 0 5 ML MISC, USE 1 SYRINGE THREE TIMES A DAY FOR INJECTING INSULIN, Disp: 100 each, Rfl: 5    levalbuterol (XOPENEX) 1 25 mg/3 mL nebulizer solution, USE 1 VIAL IN NEBULIZER THREE TIMES A DAY, Disp: , Rfl: 5    metoprolol tartrate (LOPRESSOR) 25 mg tablet, TAKE ONE TABLET BY MOUTH TWICE A DAY, Disp: 60 tablet, Rfl: 5    nitroglycerin (NITROSTAT) 0 4 mg SL tablet, Place 1 tablet (0 4 mg total) under the tongue every 5 (five) minutes as needed for chest pain, Disp: 30 tablet, Rfl: 5    Omega-3 Fatty Acids (FISH OIL) 645 MG CAPS, Take 1 capsule by mouth 2 (two) times a day, Disp: , Rfl:     OneTouch Ultra test strip, TEST FOUR TIMES A DAY, Disp: 150 strip, Rfl: 5    pantoprazole (PROTONIX) 40 mg tablet, TAKE ONE TABLET BY MOUTH EVERY DAY, Disp: 60 tablet, Rfl: 2    potassium chloride (K-DUR,KLOR-CON) 20 mEq tablet, TAKE ONE TABLET BY MOUTH EVERY DAY, Disp: 60 tablet, Rfl: 2    simvastatin (ZOCOR) 40 mg tablet, TAKE ONE TABLET BY MOUTH EVERY DAY, Disp: 60 tablet, Rfl: 2    traMADol (ULTRAM) 50 mg tablet, tramadol 50 mg tablet, Disp: , Rfl:     ketoconazole (NIZORAL) 2 % cream, ketoconazole 2 % topical cream (Patient not taking: Reported on 9/14/2021), Disp: , Rfl:     oxyCODONE-acetaminophen (PERCOCET) 7 5-325 MG per tablet, Take by mouth every 4 (four) hours (Patient not taking: Reported on 9/14/2021), Disp: , Rfl:     tamsulosin (FLOMAX) 0 4 mg, TAKE ONE CAPSULE BY MOUTH EVERY DAY (Patient not taking: Reported on 9/14/2021), Disp: 60 capsule, Rfl: 2  Allergies   Allergen Reactions    Crestor [Rosuvastatin]     Metformin GI Intolerance       Review of Systems:  Review of Systems   Constitutional: Negative  HENT: Positive for hearing loss  Eyes: Positive for visual disturbance  Respiratory: Positive for chest tightness and shortness of breath  Cardiovascular: Positive for chest pain  Gastrointestinal: Negative  Pt has hernia   Endocrine: Negative  Genitourinary: Positive for frequency  Musculoskeletal: Positive for arthralgias, back pain and joint swelling  Skin: Negative  Allergic/Immunologic: Negative  Neurological: Positive for dizziness and weakness  Hematological: Bruises/bleeds easily  Psychiatric/Behavioral: Negative  Vitals:    09/14/21 1305   BP: 118/68   BP Location: Left arm   Patient Position: Sitting   Cuff Size: Large   Pulse: 90   Resp: 17   Temp: 97 7 °F (36 5 °C)   TempSrc: Temporal        Pain Score:   8    Imaging:No results found

## 2021-10-13 DIAGNOSIS — G89.29 CHRONIC BILATERAL LOW BACK PAIN, UNSPECIFIED WHETHER SCIATICA PRESENT: ICD-10-CM

## 2021-10-13 DIAGNOSIS — M54.50 CHRONIC BILATERAL LOW BACK PAIN, UNSPECIFIED WHETHER SCIATICA PRESENT: ICD-10-CM

## 2021-10-13 DIAGNOSIS — J44.9 CHRONIC OBSTRUCTIVE PULMONARY DISEASE, UNSPECIFIED COPD TYPE (HCC): ICD-10-CM

## 2021-10-13 RX ORDER — FLUTICASONE FUROATE, UMECLIDINIUM BROMIDE AND VILANTEROL TRIFENATATE 100; 62.5; 25 UG/1; UG/1; UG/1
POWDER RESPIRATORY (INHALATION)
Qty: 60 EACH | Refills: 3 | Status: ON HOLD | OUTPATIENT
Start: 2021-10-13 | End: 2022-05-03 | Stop reason: SDUPTHER

## 2021-10-13 RX ORDER — CYCLOBENZAPRINE HCL 10 MG
TABLET ORAL
Qty: 60 TABLET | Refills: 0 | Status: SHIPPED | OUTPATIENT
Start: 2021-10-13 | End: 2021-12-06

## 2021-10-27 DIAGNOSIS — E11.65 TYPE 2 DIABETES MELLITUS WITH HYPERGLYCEMIA, WITH LONG-TERM CURRENT USE OF INSULIN (HCC): Chronic | ICD-10-CM

## 2021-10-27 DIAGNOSIS — Z79.4 TYPE 2 DIABETES MELLITUS WITH HYPERGLYCEMIA, WITH LONG-TERM CURRENT USE OF INSULIN (HCC): Chronic | ICD-10-CM

## 2021-10-27 RX ORDER — SYRINGE,INSUL U-500,NDL,0.5ML 31GX15/64"
SYRINGE, EMPTY DISPOSABLE MISCELLANEOUS
Qty: 100 EACH | Refills: 5 | Status: SHIPPED | OUTPATIENT
Start: 2021-10-27

## 2021-12-06 DIAGNOSIS — G89.29 CHRONIC BILATERAL LOW BACK PAIN, UNSPECIFIED WHETHER SCIATICA PRESENT: ICD-10-CM

## 2021-12-06 DIAGNOSIS — M54.50 CHRONIC BILATERAL LOW BACK PAIN, UNSPECIFIED WHETHER SCIATICA PRESENT: ICD-10-CM

## 2021-12-06 RX ORDER — CYCLOBENZAPRINE HCL 10 MG
TABLET ORAL
Qty: 60 TABLET | Refills: 0 | Status: ON HOLD | OUTPATIENT
Start: 2021-12-06 | End: 2022-02-23 | Stop reason: SDUPTHER

## 2021-12-11 ENCOUNTER — APPOINTMENT (EMERGENCY)
Dept: RADIOLOGY | Facility: HOSPITAL | Age: 76
DRG: 291 | End: 2021-12-11
Payer: COMMERCIAL

## 2021-12-11 ENCOUNTER — HOSPITAL ENCOUNTER (INPATIENT)
Facility: HOSPITAL | Age: 76
LOS: 5 days | Discharge: HOME | DRG: 291 | End: 2021-12-16
Attending: EMERGENCY MEDICINE | Admitting: INTERNAL MEDICINE
Payer: COMMERCIAL

## 2021-12-11 ENCOUNTER — APPOINTMENT (INPATIENT)
Dept: RADIOLOGY | Facility: HOSPITAL | Age: 76
DRG: 291 | End: 2021-12-11
Payer: COMMERCIAL

## 2021-12-11 DIAGNOSIS — C34.90 ADENOCARCINOMA, LUNG (HCC): ICD-10-CM

## 2021-12-11 DIAGNOSIS — S92.253A NAVICULAR FRACTURE OF ANKLE: ICD-10-CM

## 2021-12-11 DIAGNOSIS — J90 PLEURAL EFFUSION: ICD-10-CM

## 2021-12-11 DIAGNOSIS — R06.02 SHORTNESS OF BREATH: Primary | ICD-10-CM

## 2021-12-11 DIAGNOSIS — I50.9 ACUTE CHF (CONGESTIVE HEART FAILURE) (HCC): ICD-10-CM

## 2021-12-11 DIAGNOSIS — R06.00 DYSPNEA ON EXERTION: ICD-10-CM

## 2021-12-11 LAB
2HR DELTA HS TROPONIN: -2 NG/L
4HR DELTA HS TROPONIN: -2 NG/L
ALBUMIN SERPL BCP-MCNC: 3.1 G/DL (ref 3.5–5)
ALP SERPL-CCNC: 102 U/L (ref 46–116)
ALT SERPL W P-5'-P-CCNC: 13 U/L (ref 12–78)
ANION GAP SERPL CALCULATED.3IONS-SCNC: 8 MMOL/L (ref 4–13)
APTT PPP: 33 SECONDS (ref 23–37)
AST SERPL W P-5'-P-CCNC: 13 U/L (ref 5–45)
ATRIAL RATE: 111 BPM
BASOPHILS # BLD AUTO: 0.02 THOUSANDS/ΜL (ref 0–0.1)
BASOPHILS NFR BLD AUTO: 0 % (ref 0–1)
BILIRUB SERPL-MCNC: 0.69 MG/DL (ref 0.2–1)
BUN SERPL-MCNC: 19 MG/DL (ref 5–25)
CALCIUM ALBUM COR SERPL-MCNC: 11.2 MG/DL (ref 8.3–10.1)
CALCIUM SERPL-MCNC: 10.5 MG/DL (ref 8.3–10.1)
CARDIAC TROPONIN I PNL SERPL HS: 15 NG/L
CARDIAC TROPONIN I PNL SERPL HS: 15 NG/L
CARDIAC TROPONIN I PNL SERPL HS: 17 NG/L
CHLORIDE SERPL-SCNC: 93 MMOL/L (ref 100–108)
CO2 SERPL-SCNC: 28 MMOL/L (ref 21–32)
CREAT SERPL-MCNC: 1.54 MG/DL (ref 0.6–1.3)
D DIMER PPP FEU-MCNC: 0.99 UG/ML FEU
EOSINOPHIL # BLD AUTO: 0.01 THOUSAND/ΜL (ref 0–0.61)
EOSINOPHIL NFR BLD AUTO: 0 % (ref 0–6)
ERYTHROCYTE [DISTWIDTH] IN BLOOD BY AUTOMATED COUNT: 16.2 % (ref 11.6–15.1)
FLUAV RNA RESP QL NAA+PROBE: NEGATIVE
FLUBV RNA RESP QL NAA+PROBE: NEGATIVE
GFR SERPL CREATININE-BSD FRML MDRD: 44 ML/MIN/1.73SQ M
GLUCOSE SERPL-MCNC: 308 MG/DL (ref 65–140)
GLUCOSE SERPL-MCNC: 397 MG/DL (ref 65–140)
GLUCOSE SERPL-MCNC: 421 MG/DL (ref 65–140)
GLUCOSE SERPL-MCNC: 449 MG/DL (ref 65–140)
HCT VFR BLD AUTO: 39.6 % (ref 36.5–49.3)
HGB BLD-MCNC: 12.5 G/DL (ref 12–17)
IMM GRANULOCYTES # BLD AUTO: 0.11 THOUSAND/UL (ref 0–0.2)
IMM GRANULOCYTES NFR BLD AUTO: 1 % (ref 0–2)
INR PPP: 1.1 (ref 0.84–1.19)
LYMPHOCYTES # BLD AUTO: 0.84 THOUSANDS/ΜL (ref 0.6–4.47)
LYMPHOCYTES NFR BLD AUTO: 5 % (ref 14–44)
MCH RBC QN AUTO: 25.5 PG (ref 26.8–34.3)
MCHC RBC AUTO-ENTMCNC: 31.6 G/DL (ref 31.4–37.4)
MCV RBC AUTO: 81 FL (ref 82–98)
MONOCYTES # BLD AUTO: 1.13 THOUSAND/ΜL (ref 0.17–1.22)
MONOCYTES NFR BLD AUTO: 7 % (ref 4–12)
NEUTROPHILS # BLD AUTO: 15.14 THOUSANDS/ΜL (ref 1.85–7.62)
NEUTS SEG NFR BLD AUTO: 87 % (ref 43–75)
NRBC BLD AUTO-RTO: 0 /100 WBCS
NT-PROBNP SERPL-MCNC: 932 PG/ML
P AXIS: 96 DEGREES
PLATELET # BLD AUTO: 169 THOUSANDS/UL (ref 149–390)
PMV BLD AUTO: 10.3 FL (ref 8.9–12.7)
POTASSIUM SERPL-SCNC: 4.5 MMOL/L (ref 3.5–5.3)
PR INTERVAL: 200 MS
PROCALCITONIN SERPL-MCNC: 0.45 NG/ML
PROT SERPL-MCNC: 8.6 G/DL (ref 6.4–8.2)
PROTHROMBIN TIME: 13.8 SECONDS (ref 11.6–14.5)
QRS AXIS: 102 DEGREES
QRSD INTERVAL: 142 MS
QT INTERVAL: 372 MS
QTC INTERVAL: 505 MS
RBC # BLD AUTO: 4.91 MILLION/UL (ref 3.88–5.62)
RSV RNA RESP QL NAA+PROBE: NEGATIVE
SARS-COV-2 RNA RESP QL NAA+PROBE: NEGATIVE
SODIUM SERPL-SCNC: 129 MMOL/L (ref 136–145)
T WAVE AXIS: 45 DEGREES
VENTRICULAR RATE: 111 BPM
WBC # BLD AUTO: 17.25 THOUSAND/UL (ref 4.31–10.16)

## 2021-12-11 PROCEDURE — 73610 X-RAY EXAM OF ANKLE: CPT

## 2021-12-11 PROCEDURE — 84484 ASSAY OF TROPONIN QUANT: CPT | Performed by: EMERGENCY MEDICINE

## 2021-12-11 PROCEDURE — 0241U HB NFCT DS VIR RESP RNA 4 TRGT: CPT | Performed by: EMERGENCY MEDICINE

## 2021-12-11 PROCEDURE — 94760 N-INVAS EAR/PLS OXIMETRY 1: CPT

## 2021-12-11 PROCEDURE — 85610 PROTHROMBIN TIME: CPT | Performed by: EMERGENCY MEDICINE

## 2021-12-11 PROCEDURE — 85379 FIBRIN DEGRADATION QUANT: CPT | Performed by: INTERNAL MEDICINE

## 2021-12-11 PROCEDURE — 93010 ELECTROCARDIOGRAM REPORT: CPT | Performed by: INTERNAL MEDICINE

## 2021-12-11 PROCEDURE — 96374 THER/PROPH/DIAG INJ IV PUSH: CPT

## 2021-12-11 PROCEDURE — 99285 EMERGENCY DEPT VISIT HI MDM: CPT | Performed by: EMERGENCY MEDICINE

## 2021-12-11 PROCEDURE — 36415 COLL VENOUS BLD VENIPUNCTURE: CPT | Performed by: EMERGENCY MEDICINE

## 2021-12-11 PROCEDURE — G1004 CDSM NDSC: HCPCS

## 2021-12-11 PROCEDURE — 71045 X-RAY EXAM CHEST 1 VIEW: CPT

## 2021-12-11 PROCEDURE — 82948 REAGENT STRIP/BLOOD GLUCOSE: CPT

## 2021-12-11 PROCEDURE — 99223 1ST HOSP IP/OBS HIGH 75: CPT | Performed by: INTERNAL MEDICINE

## 2021-12-11 PROCEDURE — 85730 THROMBOPLASTIN TIME PARTIAL: CPT | Performed by: EMERGENCY MEDICINE

## 2021-12-11 PROCEDURE — 71250 CT THORAX DX C-: CPT

## 2021-12-11 PROCEDURE — 99285 EMERGENCY DEPT VISIT HI MDM: CPT

## 2021-12-11 PROCEDURE — 85025 COMPLETE CBC W/AUTO DIFF WBC: CPT | Performed by: EMERGENCY MEDICINE

## 2021-12-11 PROCEDURE — 84145 PROCALCITONIN (PCT): CPT | Performed by: EMERGENCY MEDICINE

## 2021-12-11 PROCEDURE — 94640 AIRWAY INHALATION TREATMENT: CPT

## 2021-12-11 PROCEDURE — 93005 ELECTROCARDIOGRAM TRACING: CPT

## 2021-12-11 PROCEDURE — 80053 COMPREHEN METABOLIC PANEL: CPT | Performed by: EMERGENCY MEDICINE

## 2021-12-11 PROCEDURE — 83880 ASSAY OF NATRIURETIC PEPTIDE: CPT | Performed by: EMERGENCY MEDICINE

## 2021-12-11 RX ORDER — LEVALBUTEROL 1.25 MG/.5ML
1.25 SOLUTION, CONCENTRATE RESPIRATORY (INHALATION)
Status: DISCONTINUED | OUTPATIENT
Start: 2021-12-11 | End: 2021-12-11

## 2021-12-11 RX ORDER — FUROSEMIDE 10 MG/ML
40 INJECTION INTRAMUSCULAR; INTRAVENOUS ONCE
Status: COMPLETED | OUTPATIENT
Start: 2021-12-11 | End: 2021-12-11

## 2021-12-11 RX ORDER — FLUTICASONE FUROATE AND VILANTEROL 100; 25 UG/1; UG/1
1 POWDER RESPIRATORY (INHALATION) DAILY
Status: DISCONTINUED | OUTPATIENT
Start: 2021-12-12 | End: 2021-12-16 | Stop reason: HOSPADM

## 2021-12-11 RX ORDER — INSULIN GLARGINE 100 [IU]/ML
25 INJECTION, SOLUTION SUBCUTANEOUS
Status: DISCONTINUED | OUTPATIENT
Start: 2021-12-11 | End: 2021-12-13

## 2021-12-11 RX ORDER — ASPIRIN 81 MG/1
81 TABLET ORAL DAILY
Status: DISCONTINUED | OUTPATIENT
Start: 2021-12-12 | End: 2021-12-16 | Stop reason: HOSPADM

## 2021-12-11 RX ORDER — FUROSEMIDE 10 MG/ML
40 INJECTION INTRAMUSCULAR; INTRAVENOUS 2 TIMES DAILY
Status: DISCONTINUED | OUTPATIENT
Start: 2021-12-11 | End: 2021-12-12

## 2021-12-11 RX ORDER — CALCIUM CARBONATE 200(500)MG
1000 TABLET,CHEWABLE ORAL DAILY PRN
Status: DISCONTINUED | OUTPATIENT
Start: 2021-12-11 | End: 2021-12-16 | Stop reason: HOSPADM

## 2021-12-11 RX ORDER — DOCUSATE SODIUM 100 MG/1
100 CAPSULE, LIQUID FILLED ORAL 2 TIMES DAILY
Status: DISCONTINUED | OUTPATIENT
Start: 2021-12-11 | End: 2021-12-16 | Stop reason: HOSPADM

## 2021-12-11 RX ORDER — SENNOSIDES 8.6 MG
2 TABLET ORAL DAILY
Status: DISCONTINUED | OUTPATIENT
Start: 2021-12-12 | End: 2021-12-16 | Stop reason: HOSPADM

## 2021-12-11 RX ORDER — CYCLOBENZAPRINE HCL 10 MG
10 TABLET ORAL 3 TIMES DAILY
Status: DISCONTINUED | OUTPATIENT
Start: 2021-12-11 | End: 2021-12-16 | Stop reason: HOSPADM

## 2021-12-11 RX ORDER — NITROGLYCERIN 0.4 MG/1
0.4 TABLET SUBLINGUAL
Status: DISCONTINUED | OUTPATIENT
Start: 2021-12-11 | End: 2021-12-16 | Stop reason: HOSPADM

## 2021-12-11 RX ORDER — ALBUTEROL SULFATE 2.5 MG/3ML
2.5 SOLUTION RESPIRATORY (INHALATION) EVERY 4 HOURS PRN
Status: DISCONTINUED | OUTPATIENT
Start: 2021-12-11 | End: 2021-12-16 | Stop reason: HOSPADM

## 2021-12-11 RX ORDER — ONDANSETRON 2 MG/ML
4 INJECTION INTRAMUSCULAR; INTRAVENOUS EVERY 6 HOURS PRN
Status: DISCONTINUED | OUTPATIENT
Start: 2021-12-11 | End: 2021-12-16 | Stop reason: HOSPADM

## 2021-12-11 RX ORDER — PRAVASTATIN SODIUM 80 MG/1
80 TABLET ORAL
Status: DISCONTINUED | OUTPATIENT
Start: 2021-12-12 | End: 2021-12-16 | Stop reason: HOSPADM

## 2021-12-11 RX ORDER — ACETAMINOPHEN 325 MG/1
650 TABLET ORAL EVERY 4 HOURS PRN
Status: DISCONTINUED | OUTPATIENT
Start: 2021-12-11 | End: 2021-12-16 | Stop reason: HOSPADM

## 2021-12-11 RX ORDER — POTASSIUM CHLORIDE 20 MEQ/1
20 TABLET, EXTENDED RELEASE ORAL DAILY
Status: DISCONTINUED | OUTPATIENT
Start: 2021-12-12 | End: 2021-12-16 | Stop reason: HOSPADM

## 2021-12-11 RX ORDER — HEPARIN SODIUM 5000 [USP'U]/ML
5000 INJECTION, SOLUTION INTRAVENOUS; SUBCUTANEOUS EVERY 8 HOURS SCHEDULED
Status: DISCONTINUED | OUTPATIENT
Start: 2021-12-11 | End: 2021-12-16 | Stop reason: HOSPADM

## 2021-12-11 RX ORDER — PANTOPRAZOLE SODIUM 40 MG/1
40 TABLET, DELAYED RELEASE ORAL
Status: DISCONTINUED | OUTPATIENT
Start: 2021-12-12 | End: 2021-12-16 | Stop reason: HOSPADM

## 2021-12-11 RX ADMIN — LEVALBUTEROL HYDROCHLORIDE 1.25 MG: 1.25 SOLUTION, CONCENTRATE RESPIRATORY (INHALATION) at 20:47

## 2021-12-11 RX ADMIN — INSULIN GLARGINE 25 UNITS: 100 INJECTION, SOLUTION SUBCUTANEOUS at 22:26

## 2021-12-11 RX ADMIN — INSULIN LISPRO 12 UNITS: 100 INJECTION, SOLUTION INTRAVENOUS; SUBCUTANEOUS at 23:33

## 2021-12-11 RX ADMIN — HEPARIN SODIUM 5000 UNITS: 5000 INJECTION INTRAVENOUS; SUBCUTANEOUS at 22:26

## 2021-12-11 RX ADMIN — INSULIN LISPRO 12 UNITS: 100 INJECTION, SOLUTION INTRAVENOUS; SUBCUTANEOUS at 21:26

## 2021-12-11 RX ADMIN — FUROSEMIDE 40 MG: 10 INJECTION, SOLUTION INTRAVENOUS at 15:53

## 2021-12-11 RX ADMIN — INSULIN LISPRO 10 UNITS: 100 INJECTION, SOLUTION INTRAVENOUS; SUBCUTANEOUS at 21:23

## 2021-12-11 RX ADMIN — FUROSEMIDE 40 MG: 10 INJECTION, SOLUTION INTRAMUSCULAR; INTRAVENOUS at 21:23

## 2021-12-11 RX ADMIN — CYCLOBENZAPRINE HYDROCHLORIDE 10 MG: 10 TABLET, FILM COATED ORAL at 20:47

## 2021-12-11 RX ADMIN — METOPROLOL TARTRATE 25 MG: 25 TABLET, FILM COATED ORAL at 20:47

## 2021-12-12 ENCOUNTER — APPOINTMENT (INPATIENT)
Dept: NON INVASIVE DIAGNOSTICS | Facility: HOSPITAL | Age: 76
DRG: 291 | End: 2021-12-12
Payer: COMMERCIAL

## 2021-12-12 PROBLEM — S92.252A: Status: ACTIVE | Noted: 2021-12-12

## 2021-12-12 LAB
ANION GAP SERPL CALCULATED.3IONS-SCNC: 5 MMOL/L (ref 4–13)
BUN SERPL-MCNC: 27 MG/DL (ref 5–25)
CALCIUM SERPL-MCNC: 8.6 MG/DL (ref 8.3–10.1)
CHLORIDE SERPL-SCNC: 96 MMOL/L (ref 100–108)
CO2 SERPL-SCNC: 30 MMOL/L (ref 21–32)
CREAT SERPL-MCNC: 1.5 MG/DL (ref 0.6–1.3)
ERYTHROCYTE [DISTWIDTH] IN BLOOD BY AUTOMATED COUNT: 16.2 % (ref 11.6–15.1)
GFR SERPL CREATININE-BSD FRML MDRD: 45 ML/MIN/1.73SQ M
GLUCOSE SERPL-MCNC: 154 MG/DL (ref 65–140)
GLUCOSE SERPL-MCNC: 191 MG/DL (ref 65–140)
GLUCOSE SERPL-MCNC: 226 MG/DL (ref 65–140)
GLUCOSE SERPL-MCNC: 277 MG/DL (ref 65–140)
GLUCOSE SERPL-MCNC: 427 MG/DL (ref 65–140)
HCT VFR BLD AUTO: 35.3 % (ref 36.5–49.3)
HGB BLD-MCNC: 11.1 G/DL (ref 12–17)
MCH RBC QN AUTO: 25.3 PG (ref 26.8–34.3)
MCHC RBC AUTO-ENTMCNC: 31.4 G/DL (ref 31.4–37.4)
MCV RBC AUTO: 80 FL (ref 82–98)
PLATELET # BLD AUTO: 152 THOUSANDS/UL (ref 149–390)
PMV BLD AUTO: 10.6 FL (ref 8.9–12.7)
POTASSIUM SERPL-SCNC: 3.7 MMOL/L (ref 3.5–5.3)
PROCALCITONIN SERPL-MCNC: 0.49 NG/ML
RBC # BLD AUTO: 4.39 MILLION/UL (ref 3.88–5.62)
SODIUM SERPL-SCNC: 131 MMOL/L (ref 136–145)
WBC # BLD AUTO: 11.3 THOUSAND/UL (ref 4.31–10.16)

## 2021-12-12 PROCEDURE — 84145 PROCALCITONIN (PCT): CPT | Performed by: INTERNAL MEDICINE

## 2021-12-12 PROCEDURE — 82948 REAGENT STRIP/BLOOD GLUCOSE: CPT

## 2021-12-12 PROCEDURE — 99233 SBSQ HOSP IP/OBS HIGH 50: CPT | Performed by: INTERNAL MEDICINE

## 2021-12-12 PROCEDURE — 93971 EXTREMITY STUDY: CPT

## 2021-12-12 PROCEDURE — 85027 COMPLETE CBC AUTOMATED: CPT | Performed by: INTERNAL MEDICINE

## 2021-12-12 PROCEDURE — 80048 BASIC METABOLIC PNL TOTAL CA: CPT | Performed by: INTERNAL MEDICINE

## 2021-12-12 PROCEDURE — 94760 N-INVAS EAR/PLS OXIMETRY 1: CPT

## 2021-12-12 RX ORDER — FUROSEMIDE 40 MG/1
40 TABLET ORAL
Status: DISCONTINUED | OUTPATIENT
Start: 2021-12-12 | End: 2021-12-16 | Stop reason: HOSPADM

## 2021-12-12 RX ADMIN — INSULIN LISPRO 8 UNITS: 100 INJECTION, SOLUTION INTRAVENOUS; SUBCUTANEOUS at 12:12

## 2021-12-12 RX ADMIN — HEPARIN SODIUM 5000 UNITS: 5000 INJECTION INTRAVENOUS; SUBCUTANEOUS at 06:10

## 2021-12-12 RX ADMIN — INSULIN LISPRO 2 UNITS: 100 INJECTION, SOLUTION INTRAVENOUS; SUBCUTANEOUS at 08:21

## 2021-12-12 RX ADMIN — FLUTICASONE FUROATE AND VILANTEROL TRIFENATATE 1 PUFF: 100; 25 POWDER RESPIRATORY (INHALATION) at 12:13

## 2021-12-12 RX ADMIN — INSULIN LISPRO 12 UNITS: 100 INJECTION, SOLUTION INTRAVENOUS; SUBCUTANEOUS at 22:44

## 2021-12-12 RX ADMIN — PRAVASTATIN SODIUM 80 MG: 80 TABLET ORAL at 17:22

## 2021-12-12 RX ADMIN — METOPROLOL TARTRATE 25 MG: 25 TABLET, FILM COATED ORAL at 11:03

## 2021-12-12 RX ADMIN — INSULIN LISPRO 8 UNITS: 100 INJECTION, SOLUTION INTRAVENOUS; SUBCUTANEOUS at 08:21

## 2021-12-12 RX ADMIN — METOPROLOL TARTRATE 25 MG: 25 TABLET, FILM COATED ORAL at 17:22

## 2021-12-12 RX ADMIN — ASPIRIN 81 MG: 81 TABLET, COATED ORAL at 08:15

## 2021-12-12 RX ADMIN — CYCLOBENZAPRINE HYDROCHLORIDE 10 MG: 10 TABLET, FILM COATED ORAL at 17:22

## 2021-12-12 RX ADMIN — HEPARIN SODIUM 5000 UNITS: 5000 INJECTION INTRAVENOUS; SUBCUTANEOUS at 13:01

## 2021-12-12 RX ADMIN — PANTOPRAZOLE SODIUM 40 MG: 40 TABLET, DELAYED RELEASE ORAL at 06:10

## 2021-12-12 RX ADMIN — CYCLOBENZAPRINE HYDROCHLORIDE 10 MG: 10 TABLET, FILM COATED ORAL at 22:43

## 2021-12-12 RX ADMIN — POTASSIUM CHLORIDE 20 MEQ: 1500 TABLET, EXTENDED RELEASE ORAL at 08:15

## 2021-12-12 RX ADMIN — FUROSEMIDE 40 MG: 40 TABLET ORAL at 17:22

## 2021-12-12 RX ADMIN — INSULIN LISPRO 6 UNITS: 100 INJECTION, SOLUTION INTRAVENOUS; SUBCUTANEOUS at 12:12

## 2021-12-12 RX ADMIN — CYCLOBENZAPRINE HYDROCHLORIDE 10 MG: 10 TABLET, FILM COATED ORAL at 08:15

## 2021-12-12 RX ADMIN — HEPARIN SODIUM 5000 UNITS: 5000 INJECTION INTRAVENOUS; SUBCUTANEOUS at 22:43

## 2021-12-12 RX ADMIN — INSULIN GLARGINE 25 UNITS: 100 INJECTION, SOLUTION SUBCUTANEOUS at 22:43

## 2021-12-13 LAB
GLUCOSE SERPL-MCNC: 226 MG/DL (ref 65–140)
GLUCOSE SERPL-MCNC: 238 MG/DL (ref 65–140)
GLUCOSE SERPL-MCNC: 254 MG/DL (ref 65–140)
GLUCOSE SERPL-MCNC: 297 MG/DL (ref 65–140)

## 2021-12-13 PROCEDURE — 97167 OT EVAL HIGH COMPLEX 60 MIN: CPT

## 2021-12-13 PROCEDURE — 94760 N-INVAS EAR/PLS OXIMETRY 1: CPT

## 2021-12-13 PROCEDURE — 82948 REAGENT STRIP/BLOOD GLUCOSE: CPT

## 2021-12-13 PROCEDURE — 99222 1ST HOSP IP/OBS MODERATE 55: CPT | Performed by: ORTHOPAEDIC SURGERY

## 2021-12-13 PROCEDURE — 93971 EXTREMITY STUDY: CPT | Performed by: SURGERY

## 2021-12-13 PROCEDURE — 99232 SBSQ HOSP IP/OBS MODERATE 35: CPT | Performed by: INTERNAL MEDICINE

## 2021-12-13 PROCEDURE — 97162 PT EVAL MOD COMPLEX 30 MIN: CPT

## 2021-12-13 PROCEDURE — NC001 PR NO CHARGE: Performed by: ORTHOPAEDIC SURGERY

## 2021-12-13 PROCEDURE — 28450 TX TARSAL B1 FX W/O MNPJ EA: CPT | Performed by: ORTHOPAEDIC SURGERY

## 2021-12-13 RX ORDER — INSULIN GLARGINE 100 [IU]/ML
33 INJECTION, SOLUTION SUBCUTANEOUS
Status: DISCONTINUED | OUTPATIENT
Start: 2021-12-13 | End: 2021-12-14

## 2021-12-13 RX ORDER — OXYCODONE HYDROCHLORIDE AND ACETAMINOPHEN 5; 325 MG/1; MG/1
1 TABLET ORAL EVERY 4 HOURS PRN
Status: DISCONTINUED | OUTPATIENT
Start: 2021-12-13 | End: 2021-12-16 | Stop reason: HOSPADM

## 2021-12-13 RX ADMIN — FUROSEMIDE 40 MG: 40 TABLET ORAL at 09:05

## 2021-12-13 RX ADMIN — CYCLOBENZAPRINE HYDROCHLORIDE 10 MG: 10 TABLET, FILM COATED ORAL at 22:06

## 2021-12-13 RX ADMIN — METOPROLOL TARTRATE 25 MG: 25 TABLET, FILM COATED ORAL at 09:05

## 2021-12-13 RX ADMIN — ASPIRIN 81 MG: 81 TABLET, COATED ORAL at 09:05

## 2021-12-13 RX ADMIN — CYCLOBENZAPRINE HYDROCHLORIDE 10 MG: 10 TABLET, FILM COATED ORAL at 09:05

## 2021-12-13 RX ADMIN — PANTOPRAZOLE SODIUM 40 MG: 40 TABLET, DELAYED RELEASE ORAL at 06:23

## 2021-12-13 RX ADMIN — HEPARIN SODIUM 5000 UNITS: 5000 INJECTION INTRAVENOUS; SUBCUTANEOUS at 13:02

## 2021-12-13 RX ADMIN — DOCUSATE SODIUM 100 MG: 100 CAPSULE ORAL at 17:02

## 2021-12-13 RX ADMIN — POTASSIUM CHLORIDE 20 MEQ: 1500 TABLET, EXTENDED RELEASE ORAL at 09:05

## 2021-12-13 RX ADMIN — INSULIN LISPRO 4 UNITS: 100 INJECTION, SOLUTION INTRAVENOUS; SUBCUTANEOUS at 13:03

## 2021-12-13 RX ADMIN — PRAVASTATIN SODIUM 80 MG: 80 TABLET ORAL at 17:02

## 2021-12-13 RX ADMIN — INSULIN LISPRO 6 UNITS: 100 INJECTION, SOLUTION INTRAVENOUS; SUBCUTANEOUS at 06:44

## 2021-12-13 RX ADMIN — INSULIN LISPRO 4 UNITS: 100 INJECTION, SOLUTION INTRAVENOUS; SUBCUTANEOUS at 22:06

## 2021-12-13 RX ADMIN — INSULIN GLARGINE 33 UNITS: 100 INJECTION, SOLUTION SUBCUTANEOUS at 22:06

## 2021-12-13 RX ADMIN — INSULIN LISPRO 8 UNITS: 100 INJECTION, SOLUTION INTRAVENOUS; SUBCUTANEOUS at 07:45

## 2021-12-13 RX ADMIN — METOPROLOL TARTRATE 25 MG: 25 TABLET, FILM COATED ORAL at 17:56

## 2021-12-13 RX ADMIN — FUROSEMIDE 40 MG: 40 TABLET ORAL at 17:56

## 2021-12-13 RX ADMIN — FLUTICASONE FUROATE AND VILANTEROL TRIFENATATE 1 PUFF: 100; 25 POWDER RESPIRATORY (INHALATION) at 09:07

## 2021-12-13 RX ADMIN — HEPARIN SODIUM 5000 UNITS: 5000 INJECTION INTRAVENOUS; SUBCUTANEOUS at 22:06

## 2021-12-13 RX ADMIN — OXYCODONE HYDROCHLORIDE AND ACETAMINOPHEN 1 TABLET: 5; 325 TABLET ORAL at 09:04

## 2021-12-13 RX ADMIN — CYCLOBENZAPRINE HYDROCHLORIDE 10 MG: 10 TABLET, FILM COATED ORAL at 17:01

## 2021-12-13 RX ADMIN — HEPARIN SODIUM 5000 UNITS: 5000 INJECTION INTRAVENOUS; SUBCUTANEOUS at 06:23

## 2021-12-14 PROBLEM — N18.9 ACUTE KIDNEY INJURY SUPERIMPOSED ON CHRONIC KIDNEY DISEASE (HCC): Status: RESOLVED | Noted: 2017-07-24 | Resolved: 2021-12-14

## 2021-12-14 PROBLEM — N17.9 ACUTE KIDNEY INJURY SUPERIMPOSED ON CHRONIC KIDNEY DISEASE (HCC): Status: RESOLVED | Noted: 2017-07-24 | Resolved: 2021-12-14

## 2021-12-14 LAB
ANION GAP SERPL CALCULATED.3IONS-SCNC: 3 MMOL/L (ref 4–13)
BASOPHILS # BLD AUTO: 0.03 THOUSANDS/ΜL (ref 0–0.1)
BASOPHILS NFR BLD AUTO: 0 % (ref 0–1)
BUN SERPL-MCNC: 29 MG/DL (ref 5–25)
CALCIUM SERPL-MCNC: 9.6 MG/DL (ref 8.3–10.1)
CHLORIDE SERPL-SCNC: 97 MMOL/L (ref 100–108)
CO2 SERPL-SCNC: 32 MMOL/L (ref 21–32)
CREAT SERPL-MCNC: 1.38 MG/DL (ref 0.6–1.3)
EOSINOPHIL # BLD AUTO: 0.13 THOUSAND/ΜL (ref 0–0.61)
EOSINOPHIL NFR BLD AUTO: 2 % (ref 0–6)
ERYTHROCYTE [DISTWIDTH] IN BLOOD BY AUTOMATED COUNT: 15.5 % (ref 11.6–15.1)
GFR SERPL CREATININE-BSD FRML MDRD: 50 ML/MIN/1.73SQ M
GLUCOSE SERPL-MCNC: 151 MG/DL (ref 65–140)
GLUCOSE SERPL-MCNC: 164 MG/DL (ref 65–140)
GLUCOSE SERPL-MCNC: 274 MG/DL (ref 65–140)
GLUCOSE SERPL-MCNC: 284 MG/DL (ref 65–140)
GLUCOSE SERPL-MCNC: 80 MG/DL (ref 65–140)
HCT VFR BLD AUTO: 36.2 % (ref 36.5–49.3)
HGB BLD-MCNC: 11.2 G/DL (ref 12–17)
IMM GRANULOCYTES # BLD AUTO: 0.04 THOUSAND/UL (ref 0–0.2)
IMM GRANULOCYTES NFR BLD AUTO: 1 % (ref 0–2)
LYMPHOCYTES # BLD AUTO: 0.84 THOUSANDS/ΜL (ref 0.6–4.47)
LYMPHOCYTES NFR BLD AUTO: 11 % (ref 14–44)
MCH RBC QN AUTO: 25.1 PG (ref 26.8–34.3)
MCHC RBC AUTO-ENTMCNC: 30.9 G/DL (ref 31.4–37.4)
MCV RBC AUTO: 81 FL (ref 82–98)
MONOCYTES # BLD AUTO: 0.81 THOUSAND/ΜL (ref 0.17–1.22)
MONOCYTES NFR BLD AUTO: 11 % (ref 4–12)
NEUTROPHILS # BLD AUTO: 5.85 THOUSANDS/ΜL (ref 1.85–7.62)
NEUTS SEG NFR BLD AUTO: 75 % (ref 43–75)
NRBC BLD AUTO-RTO: 0 /100 WBCS
PLATELET # BLD AUTO: 179 THOUSANDS/UL (ref 149–390)
PMV BLD AUTO: 10.3 FL (ref 8.9–12.7)
POTASSIUM SERPL-SCNC: 4.3 MMOL/L (ref 3.5–5.3)
PROCALCITONIN SERPL-MCNC: 0.51 NG/ML
RBC # BLD AUTO: 4.46 MILLION/UL (ref 3.88–5.62)
SODIUM SERPL-SCNC: 132 MMOL/L (ref 136–145)
WBC # BLD AUTO: 7.7 THOUSAND/UL (ref 4.31–10.16)

## 2021-12-14 PROCEDURE — 82948 REAGENT STRIP/BLOOD GLUCOSE: CPT

## 2021-12-14 PROCEDURE — 85025 COMPLETE CBC W/AUTO DIFF WBC: CPT | Performed by: STUDENT IN AN ORGANIZED HEALTH CARE EDUCATION/TRAINING PROGRAM

## 2021-12-14 PROCEDURE — 80048 BASIC METABOLIC PNL TOTAL CA: CPT | Performed by: STUDENT IN AN ORGANIZED HEALTH CARE EDUCATION/TRAINING PROGRAM

## 2021-12-14 PROCEDURE — 84145 PROCALCITONIN (PCT): CPT | Performed by: STUDENT IN AN ORGANIZED HEALTH CARE EDUCATION/TRAINING PROGRAM

## 2021-12-14 PROCEDURE — 99232 SBSQ HOSP IP/OBS MODERATE 35: CPT | Performed by: INTERNAL MEDICINE

## 2021-12-14 RX ORDER — INSULIN GLARGINE 100 [IU]/ML
50 INJECTION, SOLUTION SUBCUTANEOUS
Status: DISCONTINUED | OUTPATIENT
Start: 2021-12-14 | End: 2021-12-16 | Stop reason: HOSPADM

## 2021-12-14 RX ADMIN — PANTOPRAZOLE SODIUM 40 MG: 40 TABLET, DELAYED RELEASE ORAL at 05:31

## 2021-12-14 RX ADMIN — INSULIN LISPRO 2 UNITS: 100 INJECTION, SOLUTION INTRAVENOUS; SUBCUTANEOUS at 21:51

## 2021-12-14 RX ADMIN — FUROSEMIDE 40 MG: 40 TABLET ORAL at 16:40

## 2021-12-14 RX ADMIN — METOPROLOL TARTRATE 25 MG: 25 TABLET, FILM COATED ORAL at 16:43

## 2021-12-14 RX ADMIN — OXYCODONE HYDROCHLORIDE AND ACETAMINOPHEN 1 TABLET: 5; 325 TABLET ORAL at 09:52

## 2021-12-14 RX ADMIN — CYCLOBENZAPRINE HYDROCHLORIDE 10 MG: 10 TABLET, FILM COATED ORAL at 16:41

## 2021-12-14 RX ADMIN — INSULIN LISPRO 6 UNITS: 100 INJECTION, SOLUTION INTRAVENOUS; SUBCUTANEOUS at 12:05

## 2021-12-14 RX ADMIN — HEPARIN SODIUM 5000 UNITS: 5000 INJECTION INTRAVENOUS; SUBCUTANEOUS at 12:05

## 2021-12-14 RX ADMIN — HEPARIN SODIUM 5000 UNITS: 5000 INJECTION INTRAVENOUS; SUBCUTANEOUS at 05:32

## 2021-12-14 RX ADMIN — FUROSEMIDE 40 MG: 40 TABLET ORAL at 09:50

## 2021-12-14 RX ADMIN — OXYCODONE HYDROCHLORIDE AND ACETAMINOPHEN 1 TABLET: 5; 325 TABLET ORAL at 16:43

## 2021-12-14 RX ADMIN — POTASSIUM CHLORIDE 20 MEQ: 1500 TABLET, EXTENDED RELEASE ORAL at 09:50

## 2021-12-14 RX ADMIN — INSULIN LISPRO 6 UNITS: 100 INJECTION, SOLUTION INTRAVENOUS; SUBCUTANEOUS at 09:50

## 2021-12-14 RX ADMIN — INSULIN GLARGINE 50 UNITS: 100 INJECTION, SOLUTION SUBCUTANEOUS at 21:50

## 2021-12-14 RX ADMIN — FLUTICASONE FUROATE AND VILANTEROL TRIFENATATE 1 PUFF: 100; 25 POWDER RESPIRATORY (INHALATION) at 09:50

## 2021-12-14 RX ADMIN — METOPROLOL TARTRATE 25 MG: 25 TABLET, FILM COATED ORAL at 09:49

## 2021-12-14 RX ADMIN — ASPIRIN 81 MG: 81 TABLET, COATED ORAL at 09:49

## 2021-12-14 RX ADMIN — HEPARIN SODIUM 5000 UNITS: 5000 INJECTION INTRAVENOUS; SUBCUTANEOUS at 21:50

## 2021-12-14 RX ADMIN — CYCLOBENZAPRINE HYDROCHLORIDE 10 MG: 10 TABLET, FILM COATED ORAL at 09:49

## 2021-12-14 RX ADMIN — PRAVASTATIN SODIUM 80 MG: 80 TABLET ORAL at 16:40

## 2021-12-14 RX ADMIN — CYCLOBENZAPRINE HYDROCHLORIDE 10 MG: 10 TABLET, FILM COATED ORAL at 21:50

## 2021-12-15 LAB
ALBUMIN SERPL BCP-MCNC: 2.7 G/DL (ref 3.5–5)
ALP SERPL-CCNC: 94 U/L (ref 46–116)
ALT SERPL W P-5'-P-CCNC: 16 U/L (ref 12–78)
ANION GAP SERPL CALCULATED.3IONS-SCNC: 4 MMOL/L (ref 4–13)
AST SERPL W P-5'-P-CCNC: 14 U/L (ref 5–45)
BASOPHILS # BLD AUTO: 0.03 THOUSANDS/ΜL (ref 0–0.1)
BASOPHILS NFR BLD AUTO: 0 % (ref 0–1)
BILIRUB SERPL-MCNC: 0.41 MG/DL (ref 0.2–1)
BUN SERPL-MCNC: 31 MG/DL (ref 5–25)
CALCIUM ALBUM COR SERPL-MCNC: 9.9 MG/DL (ref 8.3–10.1)
CALCIUM SERPL-MCNC: 8.9 MG/DL (ref 8.3–10.1)
CHLORIDE SERPL-SCNC: 94 MMOL/L (ref 100–108)
CO2 SERPL-SCNC: 34 MMOL/L (ref 21–32)
CREAT SERPL-MCNC: 1.39 MG/DL (ref 0.6–1.3)
EOSINOPHIL # BLD AUTO: 0.2 THOUSAND/ΜL (ref 0–0.61)
EOSINOPHIL NFR BLD AUTO: 2 % (ref 0–6)
ERYTHROCYTE [DISTWIDTH] IN BLOOD BY AUTOMATED COUNT: 15.6 % (ref 11.6–15.1)
GFR SERPL CREATININE-BSD FRML MDRD: 49 ML/MIN/1.73SQ M
GLUCOSE SERPL-MCNC: 144 MG/DL (ref 65–140)
GLUCOSE SERPL-MCNC: 155 MG/DL (ref 65–140)
GLUCOSE SERPL-MCNC: 171 MG/DL (ref 65–140)
GLUCOSE SERPL-MCNC: 209 MG/DL (ref 65–140)
GLUCOSE SERPL-MCNC: 226 MG/DL (ref 65–140)
HCT VFR BLD AUTO: 37.4 % (ref 36.5–49.3)
HGB BLD-MCNC: 11.6 G/DL (ref 12–17)
IMM GRANULOCYTES # BLD AUTO: 0.06 THOUSAND/UL (ref 0–0.2)
IMM GRANULOCYTES NFR BLD AUTO: 1 % (ref 0–2)
LYMPHOCYTES # BLD AUTO: 0.92 THOUSANDS/ΜL (ref 0.6–4.47)
LYMPHOCYTES NFR BLD AUTO: 11 % (ref 14–44)
MAGNESIUM SERPL-MCNC: 2.4 MG/DL (ref 1.6–2.6)
MCH RBC QN AUTO: 25.2 PG (ref 26.8–34.3)
MCHC RBC AUTO-ENTMCNC: 31 G/DL (ref 31.4–37.4)
MCV RBC AUTO: 81 FL (ref 82–98)
MONOCYTES # BLD AUTO: 0.78 THOUSAND/ΜL (ref 0.17–1.22)
MONOCYTES NFR BLD AUTO: 9 % (ref 4–12)
NEUTROPHILS # BLD AUTO: 6.57 THOUSANDS/ΜL (ref 1.85–7.62)
NEUTS SEG NFR BLD AUTO: 77 % (ref 43–75)
NRBC BLD AUTO-RTO: 0 /100 WBCS
PHOSPHATE SERPL-MCNC: 3.3 MG/DL (ref 2.3–4.1)
PLATELET # BLD AUTO: 195 THOUSANDS/UL (ref 149–390)
PMV BLD AUTO: 10.2 FL (ref 8.9–12.7)
POTASSIUM SERPL-SCNC: 3.7 MMOL/L (ref 3.5–5.3)
PROCALCITONIN SERPL-MCNC: 0.41 NG/ML
PROT SERPL-MCNC: 7.7 G/DL (ref 6.4–8.2)
RBC # BLD AUTO: 4.61 MILLION/UL (ref 3.88–5.62)
SODIUM SERPL-SCNC: 132 MMOL/L (ref 136–145)
WBC # BLD AUTO: 8.56 THOUSAND/UL (ref 4.31–10.16)

## 2021-12-15 PROCEDURE — 99232 SBSQ HOSP IP/OBS MODERATE 35: CPT | Performed by: INTERNAL MEDICINE

## 2021-12-15 PROCEDURE — 85025 COMPLETE CBC W/AUTO DIFF WBC: CPT | Performed by: INTERNAL MEDICINE

## 2021-12-15 PROCEDURE — 82948 REAGENT STRIP/BLOOD GLUCOSE: CPT

## 2021-12-15 PROCEDURE — 83735 ASSAY OF MAGNESIUM: CPT | Performed by: INTERNAL MEDICINE

## 2021-12-15 PROCEDURE — 84100 ASSAY OF PHOSPHORUS: CPT | Performed by: INTERNAL MEDICINE

## 2021-12-15 PROCEDURE — 80053 COMPREHEN METABOLIC PANEL: CPT | Performed by: INTERNAL MEDICINE

## 2021-12-15 PROCEDURE — 84145 PROCALCITONIN (PCT): CPT | Performed by: STUDENT IN AN ORGANIZED HEALTH CARE EDUCATION/TRAINING PROGRAM

## 2021-12-15 RX ADMIN — HEPARIN SODIUM 5000 UNITS: 5000 INJECTION INTRAVENOUS; SUBCUTANEOUS at 04:46

## 2021-12-15 RX ADMIN — CYCLOBENZAPRINE HYDROCHLORIDE 10 MG: 10 TABLET, FILM COATED ORAL at 07:41

## 2021-12-15 RX ADMIN — CYCLOBENZAPRINE HYDROCHLORIDE 10 MG: 10 TABLET, FILM COATED ORAL at 16:58

## 2021-12-15 RX ADMIN — INSULIN LISPRO 4 UNITS: 100 INJECTION, SOLUTION INTRAVENOUS; SUBCUTANEOUS at 22:37

## 2021-12-15 RX ADMIN — PRAVASTATIN SODIUM 80 MG: 80 TABLET ORAL at 16:58

## 2021-12-15 RX ADMIN — HEPARIN SODIUM 5000 UNITS: 5000 INJECTION INTRAVENOUS; SUBCUTANEOUS at 22:37

## 2021-12-15 RX ADMIN — INSULIN LISPRO 4 UNITS: 100 INJECTION, SOLUTION INTRAVENOUS; SUBCUTANEOUS at 07:40

## 2021-12-15 RX ADMIN — ASPIRIN 81 MG: 81 TABLET, COATED ORAL at 07:41

## 2021-12-15 RX ADMIN — FLUTICASONE FUROATE AND VILANTEROL TRIFENATATE 1 PUFF: 100; 25 POWDER RESPIRATORY (INHALATION) at 07:42

## 2021-12-15 RX ADMIN — PANTOPRAZOLE SODIUM 40 MG: 40 TABLET, DELAYED RELEASE ORAL at 04:46

## 2021-12-15 RX ADMIN — CYCLOBENZAPRINE HYDROCHLORIDE 10 MG: 10 TABLET, FILM COATED ORAL at 22:36

## 2021-12-15 RX ADMIN — POTASSIUM CHLORIDE 20 MEQ: 1500 TABLET, EXTENDED RELEASE ORAL at 07:41

## 2021-12-15 RX ADMIN — METOPROLOL TARTRATE 25 MG: 25 TABLET, FILM COATED ORAL at 16:58

## 2021-12-15 RX ADMIN — FUROSEMIDE 40 MG: 40 TABLET ORAL at 07:41

## 2021-12-15 RX ADMIN — FUROSEMIDE 40 MG: 40 TABLET ORAL at 16:58

## 2021-12-15 RX ADMIN — HEPARIN SODIUM 5000 UNITS: 5000 INJECTION INTRAVENOUS; SUBCUTANEOUS at 13:23

## 2021-12-15 RX ADMIN — INSULIN GLARGINE 50 UNITS: 100 INJECTION, SOLUTION SUBCUTANEOUS at 22:37

## 2021-12-15 RX ADMIN — METOPROLOL TARTRATE 25 MG: 25 TABLET, FILM COATED ORAL at 07:41

## 2021-12-16 VITALS
BODY MASS INDEX: 31.11 KG/M2 | RESPIRATION RATE: 20 BRPM | OXYGEN SATURATION: 92 % | SYSTOLIC BLOOD PRESSURE: 133 MMHG | DIASTOLIC BLOOD PRESSURE: 66 MMHG | WEIGHT: 229.72 LBS | HEART RATE: 80 BPM | TEMPERATURE: 97.7 F | HEIGHT: 72 IN

## 2021-12-16 PROBLEM — E87.1 HYPONATREMIA: Status: RESOLVED | Noted: 2018-05-24 | Resolved: 2021-12-16

## 2021-12-16 PROBLEM — Z85.118 HISTORY OF LUNG CANCER: Status: ACTIVE | Noted: 2020-11-18

## 2021-12-16 PROBLEM — R06.02 SHORTNESS OF BREATH: Status: RESOLVED | Noted: 2017-10-31 | Resolved: 2021-12-16

## 2021-12-16 PROBLEM — I50.33 ACUTE ON CHRONIC DIASTOLIC (CONGESTIVE) HEART FAILURE (HCC): Status: RESOLVED | Noted: 2018-05-23 | Resolved: 2021-12-16

## 2021-12-16 LAB
ANION GAP SERPL CALCULATED.3IONS-SCNC: 6 MMOL/L (ref 4–13)
BASOPHILS # BLD AUTO: 0.03 THOUSANDS/ΜL (ref 0–0.1)
BASOPHILS NFR BLD AUTO: 0 % (ref 0–1)
BUN SERPL-MCNC: 31 MG/DL (ref 5–25)
CALCIUM SERPL-MCNC: 8.7 MG/DL (ref 8.3–10.1)
CHLORIDE SERPL-SCNC: 96 MMOL/L (ref 100–108)
CO2 SERPL-SCNC: 33 MMOL/L (ref 21–32)
CREAT SERPL-MCNC: 1.45 MG/DL (ref 0.6–1.3)
EOSINOPHIL # BLD AUTO: 0.19 THOUSAND/ΜL (ref 0–0.61)
EOSINOPHIL NFR BLD AUTO: 2 % (ref 0–6)
ERYTHROCYTE [DISTWIDTH] IN BLOOD BY AUTOMATED COUNT: 15.3 % (ref 11.6–15.1)
GFR SERPL CREATININE-BSD FRML MDRD: 47 ML/MIN/1.73SQ M
GLUCOSE SERPL-MCNC: 120 MG/DL (ref 65–140)
GLUCOSE SERPL-MCNC: 175 MG/DL (ref 65–140)
GLUCOSE SERPL-MCNC: 194 MG/DL (ref 65–140)
GLUCOSE SERPL-MCNC: 209 MG/DL (ref 65–140)
HCT VFR BLD AUTO: 35.7 % (ref 36.5–49.3)
HGB BLD-MCNC: 11.1 G/DL (ref 12–17)
IMM GRANULOCYTES # BLD AUTO: 0.09 THOUSAND/UL (ref 0–0.2)
IMM GRANULOCYTES NFR BLD AUTO: 1 % (ref 0–2)
LYMPHOCYTES # BLD AUTO: 1.05 THOUSANDS/ΜL (ref 0.6–4.47)
LYMPHOCYTES NFR BLD AUTO: 13 % (ref 14–44)
MCH RBC QN AUTO: 24.8 PG (ref 26.8–34.3)
MCHC RBC AUTO-ENTMCNC: 31.1 G/DL (ref 31.4–37.4)
MCV RBC AUTO: 80 FL (ref 82–98)
MONOCYTES # BLD AUTO: 0.75 THOUSAND/ΜL (ref 0.17–1.22)
MONOCYTES NFR BLD AUTO: 9 % (ref 4–12)
NEUTROPHILS # BLD AUTO: 6.29 THOUSANDS/ΜL (ref 1.85–7.62)
NEUTS SEG NFR BLD AUTO: 75 % (ref 43–75)
NRBC BLD AUTO-RTO: 0 /100 WBCS
PLATELET # BLD AUTO: 186 THOUSANDS/UL (ref 149–390)
PMV BLD AUTO: 10.5 FL (ref 8.9–12.7)
POTASSIUM SERPL-SCNC: 4 MMOL/L (ref 3.5–5.3)
RBC # BLD AUTO: 4.48 MILLION/UL (ref 3.88–5.62)
SODIUM SERPL-SCNC: 135 MMOL/L (ref 136–145)
WBC # BLD AUTO: 8.4 THOUSAND/UL (ref 4.31–10.16)

## 2021-12-16 PROCEDURE — 99239 HOSP IP/OBS DSCHRG MGMT >30: CPT | Performed by: INTERNAL MEDICINE

## 2021-12-16 PROCEDURE — 85025 COMPLETE CBC W/AUTO DIFF WBC: CPT | Performed by: INTERNAL MEDICINE

## 2021-12-16 PROCEDURE — G0008 ADMIN INFLUENZA VIRUS VAC: HCPCS | Performed by: STUDENT IN AN ORGANIZED HEALTH CARE EDUCATION/TRAINING PROGRAM

## 2021-12-16 PROCEDURE — 90662 IIV NO PRSV INCREASED AG IM: CPT | Performed by: STUDENT IN AN ORGANIZED HEALTH CARE EDUCATION/TRAINING PROGRAM

## 2021-12-16 PROCEDURE — 82948 REAGENT STRIP/BLOOD GLUCOSE: CPT

## 2021-12-16 PROCEDURE — 80048 BASIC METABOLIC PNL TOTAL CA: CPT | Performed by: INTERNAL MEDICINE

## 2021-12-16 RX ADMIN — FUROSEMIDE 40 MG: 40 TABLET ORAL at 17:22

## 2021-12-16 RX ADMIN — HEPARIN SODIUM 5000 UNITS: 5000 INJECTION INTRAVENOUS; SUBCUTANEOUS at 06:23

## 2021-12-16 RX ADMIN — PRAVASTATIN SODIUM 80 MG: 80 TABLET ORAL at 17:22

## 2021-12-16 RX ADMIN — ASPIRIN 81 MG: 81 TABLET, COATED ORAL at 07:34

## 2021-12-16 RX ADMIN — METOPROLOL TARTRATE 25 MG: 25 TABLET, FILM COATED ORAL at 07:34

## 2021-12-16 RX ADMIN — POTASSIUM CHLORIDE 20 MEQ: 1500 TABLET, EXTENDED RELEASE ORAL at 07:35

## 2021-12-16 RX ADMIN — INFLUENZA A VIRUS A/VICTORIA/2570/2019 IVR-215 (H1N1) ANTIGEN (FORMALDEHYDE INACTIVATED), INFLUENZA A VIRUS A/TASMANIA/503/2020 IVR-221 (H3N2) ANTIGEN (FORMALDEHYDE INACTIVATED), INFLUENZA B VIRUS B/PHUKET/3073/2013 ANTIGEN (FORMALDEHYDE INACTIVATED), AND INFLUENZA B VIRUS B/WASHINGTON/02/2019 ANTIGEN (FORMALDEHYDE INACTIVATED) 0.7 ML: 60; 60; 60; 60 INJECTION, SUSPENSION INTRAMUSCULAR at 11:25

## 2021-12-16 RX ADMIN — PANTOPRAZOLE SODIUM 40 MG: 40 TABLET, DELAYED RELEASE ORAL at 06:23

## 2021-12-16 RX ADMIN — FUROSEMIDE 40 MG: 40 TABLET ORAL at 07:35

## 2021-12-16 RX ADMIN — METOPROLOL TARTRATE 25 MG: 25 TABLET, FILM COATED ORAL at 17:22

## 2021-12-16 RX ADMIN — CYCLOBENZAPRINE HYDROCHLORIDE 10 MG: 10 TABLET, FILM COATED ORAL at 17:22

## 2021-12-16 RX ADMIN — INSULIN LISPRO 2 UNITS: 100 INJECTION, SOLUTION INTRAVENOUS; SUBCUTANEOUS at 07:34

## 2021-12-16 RX ADMIN — FLUTICASONE FUROATE AND VILANTEROL TRIFENATATE 1 PUFF: 100; 25 POWDER RESPIRATORY (INHALATION) at 07:36

## 2021-12-16 RX ADMIN — INSULIN LISPRO 4 UNITS: 100 INJECTION, SOLUTION INTRAVENOUS; SUBCUTANEOUS at 11:25

## 2021-12-16 RX ADMIN — CYCLOBENZAPRINE HYDROCHLORIDE 10 MG: 10 TABLET, FILM COATED ORAL at 07:34

## 2021-12-17 ENCOUNTER — PATIENT OUTREACH (OUTPATIENT)
Dept: CASE MANAGEMENT | Facility: OTHER | Age: 76
End: 2021-12-17

## 2021-12-17 DIAGNOSIS — T74.91XD ELDER ABUSE, SUBSEQUENT ENCOUNTER: Primary | ICD-10-CM

## 2021-12-17 DIAGNOSIS — Z71.89 COMPLEX CARE COORDINATION: Primary | ICD-10-CM

## 2021-12-20 ENCOUNTER — PATIENT OUTREACH (OUTPATIENT)
Dept: CASE MANAGEMENT | Facility: OTHER | Age: 76
End: 2021-12-20

## 2021-12-23 ENCOUNTER — PATIENT OUTREACH (OUTPATIENT)
Dept: CASE MANAGEMENT | Facility: OTHER | Age: 76
End: 2021-12-23

## 2022-01-04 ENCOUNTER — PATIENT OUTREACH (OUTPATIENT)
Dept: CASE MANAGEMENT | Facility: OTHER | Age: 77
End: 2022-01-04

## 2022-01-04 NOTE — PROGRESS NOTES
Outreach attempted  I received a recording "mailbox has not been set up"  No answer and no voicemail

## 2022-01-10 ENCOUNTER — PATIENT OUTREACH (OUTPATIENT)
Dept: CASE MANAGEMENT | Facility: OTHER | Age: 77
End: 2022-01-10

## 2022-01-10 NOTE — PROGRESS NOTES
Op CM SW received update from Demond Mills RN, CM "stating SW CM will assist patient with relocating"  ABEBA FINN also had attempted outreach to [facility] with [facility] area on aging and left voicemail, no return call  ABEBA FINN attempted outreach X2, left voicemail for return call  Will remain available from return call from [facility]   Update to Demond Mills RN, CM

## 2022-01-10 NOTE — PROGRESS NOTES
Op HUMA US received message from Aldo with [facility] ( [facility]ging [facility])  He left me know that he is no longer following Stephania Markahm  He made multiple outreaches, followed for a bit but found nothing warranting him to be unsafe  He said if we feel he is still unsafe can make another referral to [facility]  ABEBA FINN will update zackary Santillan RN CM  Will continue to follow
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
## 2022-01-18 ENCOUNTER — PATIENT OUTREACH (OUTPATIENT)
Dept: CASE MANAGEMENT | Facility: OTHER | Age: 77
End: 2022-01-18

## 2022-01-18 NOTE — PROGRESS NOTES
OP CM ABEBA received message from Luisana regarding patients housing & wanting to relocate  Chart Review completed  Will attempt outreach & follow up  This writer will continue to work with patient at this time

## 2022-01-19 ENCOUNTER — PATIENT OUTREACH (OUTPATIENT)
Dept: CASE MANAGEMENT | Facility: OTHER | Age: 77
End: 2022-01-19

## 2022-01-19 NOTE — PROGRESS NOTES
OP CM SW attempted outreach to patient to follow up regarding relocating  Patient was unable to reach, & does not have a voicemail box set up to leave voicemail  Will attempt additional outreach

## 2022-01-20 ENCOUNTER — HOSPITAL ENCOUNTER (INPATIENT)
Facility: HOSPITAL | Age: 77
LOS: 9 days | Discharge: HOME HEALTH | DRG: 872 | End: 2022-01-30
Attending: EMERGENCY MEDICINE | Admitting: INTERNAL MEDICINE
Payer: COMMERCIAL

## 2022-01-20 DIAGNOSIS — N18.31 STAGE 3A CHRONIC KIDNEY DISEASE (HCC): ICD-10-CM

## 2022-01-20 DIAGNOSIS — N17.9 ACUTE KIDNEY INJURY SUPERIMPOSED ON CHRONIC KIDNEY DISEASE (HCC): ICD-10-CM

## 2022-01-20 DIAGNOSIS — I50.33 ACUTE ON CHRONIC DIASTOLIC (CONGESTIVE) HEART FAILURE (HCC): ICD-10-CM

## 2022-01-20 DIAGNOSIS — A41.9 SEPSIS (HCC): ICD-10-CM

## 2022-01-20 DIAGNOSIS — J96.11 CHRONIC RESPIRATORY FAILURE WITH HYPOXIA AND HYPERCAPNIA (HCC): Chronic | ICD-10-CM

## 2022-01-20 DIAGNOSIS — N39.0 UTI (URINARY TRACT INFECTION): Primary | ICD-10-CM

## 2022-01-20 DIAGNOSIS — R78.81 BACTEREMIA: ICD-10-CM

## 2022-01-20 DIAGNOSIS — N18.9 ACUTE KIDNEY INJURY SUPERIMPOSED ON CHRONIC KIDNEY DISEASE (HCC): ICD-10-CM

## 2022-01-20 DIAGNOSIS — J96.12 CHRONIC RESPIRATORY FAILURE WITH HYPOXIA AND HYPERCAPNIA (HCC): Chronic | ICD-10-CM

## 2022-01-20 PROCEDURE — 99285 EMERGENCY DEPT VISIT HI MDM: CPT

## 2022-01-21 ENCOUNTER — APPOINTMENT (INPATIENT)
Dept: RADIOLOGY | Facility: HOSPITAL | Age: 77
DRG: 872 | End: 2022-01-21
Payer: COMMERCIAL

## 2022-01-21 ENCOUNTER — APPOINTMENT (EMERGENCY)
Dept: RADIOLOGY | Facility: HOSPITAL | Age: 77
DRG: 872 | End: 2022-01-21
Payer: COMMERCIAL

## 2022-01-21 ENCOUNTER — PATIENT OUTREACH (OUTPATIENT)
Dept: CASE MANAGEMENT | Facility: OTHER | Age: 77
End: 2022-01-21

## 2022-01-21 PROBLEM — R65.20 SEPSIS WITH ACUTE RENAL FAILURE WITHOUT SEPTIC SHOCK (HCC): Status: ACTIVE | Noted: 2017-07-24

## 2022-01-21 PROBLEM — A41.9 SEPSIS WITH ACUTE RENAL FAILURE WITHOUT SEPTIC SHOCK (HCC): Status: ACTIVE | Noted: 2017-07-24

## 2022-01-21 PROBLEM — N18.9 ACUTE KIDNEY INJURY SUPERIMPOSED ON CHRONIC KIDNEY DISEASE (HCC): Status: ACTIVE | Noted: 2022-01-21

## 2022-01-21 PROBLEM — C34.90 ADENOCARCINOMA OF LUNG (HCC): Chronic | Status: ACTIVE | Noted: 2022-01-21

## 2022-01-21 PROBLEM — N17.9 ACUTE KIDNEY INJURY SUPERIMPOSED ON CHRONIC KIDNEY DISEASE (HCC): Status: ACTIVE | Noted: 2022-01-21

## 2022-01-21 LAB
2HR DELTA HS TROPONIN: 9 NG/L
4HR DELTA HS TROPONIN: 10 NG/L
ALBUMIN SERPL BCP-MCNC: 2.5 G/DL (ref 3.5–5)
ALP SERPL-CCNC: 139 U/L (ref 46–116)
ALT SERPL W P-5'-P-CCNC: 13 U/L (ref 12–78)
ANION GAP SERPL CALCULATED.3IONS-SCNC: 10 MMOL/L (ref 4–13)
ANION GAP SERPL CALCULATED.3IONS-SCNC: 8 MMOL/L (ref 4–13)
ANION GAP SERPL CALCULATED.3IONS-SCNC: 9 MMOL/L (ref 4–13)
ANISOCYTOSIS BLD QL SMEAR: PRESENT
ARTIFACT: PRESENT
AST SERPL W P-5'-P-CCNC: 24 U/L (ref 5–45)
BACTERIA UR QL AUTO: ABNORMAL /HPF
BASE EX.OXY STD BLDV CALC-SCNC: 94.8 % (ref 60–80)
BASE EXCESS BLDV CALC-SCNC: -1.1 MMOL/L
BASOPHILS # BLD AUTO: 0.03 THOUSANDS/ΜL (ref 0–0.1)
BASOPHILS # BLD MANUAL: 0 THOUSAND/UL (ref 0–0.1)
BASOPHILS NFR BLD AUTO: 0 % (ref 0–1)
BASOPHILS NFR MAR MANUAL: 0 % (ref 0–1)
BILIRUB SERPL-MCNC: 1.24 MG/DL (ref 0.2–1)
BILIRUB UR QL STRIP: NEGATIVE
BUN SERPL-MCNC: 32 MG/DL (ref 5–25)
BUN SERPL-MCNC: 43 MG/DL (ref 5–25)
BUN SERPL-MCNC: 51 MG/DL (ref 5–25)
CALCIUM ALBUM COR SERPL-MCNC: 10.9 MG/DL (ref 8.3–10.1)
CALCIUM SERPL-MCNC: 8.7 MG/DL (ref 8.3–10.1)
CALCIUM SERPL-MCNC: 8.9 MG/DL (ref 8.3–10.1)
CALCIUM SERPL-MCNC: 9.7 MG/DL (ref 8.3–10.1)
CARDIAC TROPONIN I PNL SERPL HS: 51 NG/L
CARDIAC TROPONIN I PNL SERPL HS: 60 NG/L
CARDIAC TROPONIN I PNL SERPL HS: 61 NG/L
CHLORIDE SERPL-SCNC: 93 MMOL/L (ref 100–108)
CHLORIDE SERPL-SCNC: 94 MMOL/L (ref 100–108)
CHLORIDE SERPL-SCNC: 95 MMOL/L (ref 100–108)
CK MB SERPL-MCNC: 1.2 % (ref 0–2.5)
CK MB SERPL-MCNC: 6.7 NG/ML (ref 0–5)
CK SERPL-CCNC: 578 U/L (ref 39–308)
CLARITY UR: ABNORMAL
CO2 SERPL-SCNC: 23 MMOL/L (ref 21–32)
CO2 SERPL-SCNC: 27 MMOL/L (ref 21–32)
CO2 SERPL-SCNC: 28 MMOL/L (ref 21–32)
COARSE GRAN CASTS URNS QL MICRO: ABNORMAL /LPF
COLOR UR: ABNORMAL
CREAT SERPL-MCNC: 2.1 MG/DL (ref 0.6–1.3)
CREAT SERPL-MCNC: 2.7 MG/DL (ref 0.6–1.3)
CREAT SERPL-MCNC: 2.76 MG/DL (ref 0.6–1.3)
EOSINOPHIL # BLD AUTO: 0.01 THOUSAND/ΜL (ref 0–0.61)
EOSINOPHIL # BLD MANUAL: 0 THOUSAND/UL (ref 0–0.4)
EOSINOPHIL NFR BLD AUTO: 0 % (ref 0–6)
EOSINOPHIL NFR BLD MANUAL: 0 % (ref 0–6)
ERYTHROCYTE [DISTWIDTH] IN BLOOD BY AUTOMATED COUNT: 15.5 % (ref 11.6–15.1)
ERYTHROCYTE [DISTWIDTH] IN BLOOD BY AUTOMATED COUNT: 15.5 % (ref 11.6–15.1)
FLUAV RNA RESP QL NAA+PROBE: NEGATIVE
FLUBV RNA RESP QL NAA+PROBE: NEGATIVE
GFR SERPL CREATININE-BSD FRML MDRD: 21 ML/MIN/1.73SQ M
GFR SERPL CREATININE-BSD FRML MDRD: 22 ML/MIN/1.73SQ M
GFR SERPL CREATININE-BSD FRML MDRD: 30 ML/MIN/1.73SQ M
GLUCOSE SERPL-MCNC: 228 MG/DL (ref 65–140)
GLUCOSE SERPL-MCNC: 263 MG/DL (ref 65–140)
GLUCOSE SERPL-MCNC: 282 MG/DL (ref 65–140)
GLUCOSE SERPL-MCNC: 307 MG/DL (ref 65–140)
GLUCOSE SERPL-MCNC: 337 MG/DL (ref 65–140)
GLUCOSE SERPL-MCNC: 344 MG/DL (ref 65–140)
GLUCOSE SERPL-MCNC: 371 MG/DL (ref 65–140)
GLUCOSE UR STRIP-MCNC: ABNORMAL MG/DL
HCO3 BLDV-SCNC: 24.4 MMOL/L (ref 24–30)
HCT VFR BLD AUTO: 35.6 % (ref 36.5–49.3)
HCT VFR BLD AUTO: 37.7 % (ref 36.5–49.3)
HGB BLD-MCNC: 11.5 G/DL (ref 12–17)
HGB BLD-MCNC: 12.2 G/DL (ref 12–17)
HGB UR QL STRIP.AUTO: ABNORMAL
IMM GRANULOCYTES # BLD AUTO: 0.21 THOUSAND/UL (ref 0–0.2)
IMM GRANULOCYTES NFR BLD AUTO: 1 % (ref 0–2)
KETONES UR STRIP-MCNC: ABNORMAL MG/DL
LACTATE SERPL-SCNC: 1.7 MMOL/L (ref 0.5–2)
LACTATE SERPL-SCNC: 2.3 MMOL/L (ref 0.5–2)
LEUKOCYTE ESTERASE UR QL STRIP: ABNORMAL
LYMPHOCYTES # BLD AUTO: 0.47 THOUSAND/UL (ref 0.6–4.47)
LYMPHOCYTES # BLD AUTO: 0.55 THOUSANDS/ΜL (ref 0.6–4.47)
LYMPHOCYTES # BLD AUTO: 2 % (ref 14–44)
LYMPHOCYTES NFR BLD AUTO: 3 % (ref 14–44)
MCH RBC QN AUTO: 26.1 PG (ref 26.8–34.3)
MCH RBC QN AUTO: 26.1 PG (ref 26.8–34.3)
MCHC RBC AUTO-ENTMCNC: 32.3 G/DL (ref 31.4–37.4)
MCHC RBC AUTO-ENTMCNC: 32.4 G/DL (ref 31.4–37.4)
MCV RBC AUTO: 81 FL (ref 82–98)
MCV RBC AUTO: 81 FL (ref 82–98)
METAMYELOCYTES NFR BLD MANUAL: 1 % (ref 0–1)
MICROCYTES BLD QL AUTO: PRESENT
MONOCYTES # BLD AUTO: 1.61 THOUSAND/ΜL (ref 0.17–1.22)
MONOCYTES # BLD AUTO: 1.89 THOUSAND/UL (ref 0–1.22)
MONOCYTES NFR BLD AUTO: 8 % (ref 4–12)
MONOCYTES NFR BLD: 8 % (ref 4–12)
NEUTROPHILS # BLD AUTO: 17.86 THOUSANDS/ΜL (ref 1.85–7.62)
NEUTROPHILS # BLD MANUAL: 21.02 THOUSAND/UL (ref 1.85–7.62)
NEUTS BAND NFR BLD MANUAL: 17 % (ref 0–8)
NEUTS SEG NFR BLD AUTO: 72 % (ref 43–75)
NEUTS SEG NFR BLD AUTO: 88 % (ref 43–75)
NITRITE UR QL STRIP: NEGATIVE
NON-SQ EPI CELLS URNS QL MICRO: ABNORMAL /HPF
NRBC BLD AUTO-RTO: 0 /100 WBCS
O2 CT BLDV-SCNC: 16.5 ML/DL
PCO2 BLDV: 43.7 MM HG (ref 42–50)
PH BLDV: 7.36 [PH] (ref 7.3–7.4)
PH UR STRIP.AUTO: 5.5 [PH]
PLATELET # BLD AUTO: 155 THOUSANDS/UL (ref 149–390)
PLATELET # BLD AUTO: 178 THOUSANDS/UL (ref 149–390)
PLATELET BLD QL SMEAR: ADEQUATE
PMV BLD AUTO: 10.8 FL (ref 8.9–12.7)
PMV BLD AUTO: 11.4 FL (ref 8.9–12.7)
PO2 BLDV: 100.1 MM HG (ref 35–45)
POIKILOCYTOSIS BLD QL SMEAR: PRESENT
POLYCHROMASIA BLD QL SMEAR: PRESENT
POTASSIUM SERPL-SCNC: 3.8 MMOL/L (ref 3.5–5.3)
POTASSIUM SERPL-SCNC: 3.9 MMOL/L (ref 3.5–5.3)
POTASSIUM SERPL-SCNC: 4 MMOL/L (ref 3.5–5.3)
PROCALCITONIN SERPL-MCNC: 11.61 NG/ML
PROT SERPL-MCNC: 7.7 G/DL (ref 6.4–8.2)
PROT UR STRIP-MCNC: ABNORMAL MG/DL
RBC # BLD AUTO: 4.41 MILLION/UL (ref 3.88–5.62)
RBC # BLD AUTO: 4.68 MILLION/UL (ref 3.88–5.62)
RBC #/AREA URNS AUTO: ABNORMAL /HPF
RBC MORPH BLD: PRESENT
RSV RNA RESP QL NAA+PROBE: NEGATIVE
SARS-COV-2 RNA RESP QL NAA+PROBE: NEGATIVE
SODIUM SERPL-SCNC: 126 MMOL/L (ref 136–145)
SODIUM SERPL-SCNC: 130 MMOL/L (ref 136–145)
SODIUM SERPL-SCNC: 131 MMOL/L (ref 136–145)
SP GR UR STRIP.AUTO: 1.02 (ref 1–1.03)
UROBILINOGEN UR QL STRIP.AUTO: 1 E.U./DL
WBC # BLD AUTO: 20.27 THOUSAND/UL (ref 4.31–10.16)
WBC # BLD AUTO: 23.62 THOUSAND/UL (ref 4.31–10.16)
WBC #/AREA URNS AUTO: ABNORMAL /HPF

## 2022-01-21 PROCEDURE — G1004 CDSM NDSC: HCPCS

## 2022-01-21 PROCEDURE — 80048 BASIC METABOLIC PNL TOTAL CA: CPT | Performed by: STUDENT IN AN ORGANIZED HEALTH CARE EDUCATION/TRAINING PROGRAM

## 2022-01-21 PROCEDURE — 76770 US EXAM ABDO BACK WALL COMP: CPT

## 2022-01-21 PROCEDURE — 84145 PROCALCITONIN (PCT): CPT | Performed by: EMERGENCY MEDICINE

## 2022-01-21 PROCEDURE — 82948 REAGENT STRIP/BLOOD GLUCOSE: CPT

## 2022-01-21 PROCEDURE — 87040 BLOOD CULTURE FOR BACTERIA: CPT | Performed by: EMERGENCY MEDICINE

## 2022-01-21 PROCEDURE — 84484 ASSAY OF TROPONIN QUANT: CPT | Performed by: EMERGENCY MEDICINE

## 2022-01-21 PROCEDURE — 82553 CREATINE MB FRACTION: CPT | Performed by: EMERGENCY MEDICINE

## 2022-01-21 PROCEDURE — 87186 SC STD MICRODIL/AGAR DIL: CPT | Performed by: EMERGENCY MEDICINE

## 2022-01-21 PROCEDURE — 82550 ASSAY OF CK (CPK): CPT | Performed by: EMERGENCY MEDICINE

## 2022-01-21 PROCEDURE — 99223 1ST HOSP IP/OBS HIGH 75: CPT | Performed by: STUDENT IN AN ORGANIZED HEALTH CARE EDUCATION/TRAINING PROGRAM

## 2022-01-21 PROCEDURE — 80053 COMPREHEN METABOLIC PANEL: CPT | Performed by: EMERGENCY MEDICINE

## 2022-01-21 PROCEDURE — 96365 THER/PROPH/DIAG IV INF INIT: CPT

## 2022-01-21 PROCEDURE — 99223 1ST HOSP IP/OBS HIGH 75: CPT | Performed by: INTERNAL MEDICINE

## 2022-01-21 PROCEDURE — 80048 BASIC METABOLIC PNL TOTAL CA: CPT | Performed by: INTERNAL MEDICINE

## 2022-01-21 PROCEDURE — 85007 BL SMEAR W/DIFF WBC COUNT: CPT | Performed by: EMERGENCY MEDICINE

## 2022-01-21 PROCEDURE — 83605 ASSAY OF LACTIC ACID: CPT | Performed by: EMERGENCY MEDICINE

## 2022-01-21 PROCEDURE — 71045 X-RAY EXAM CHEST 1 VIEW: CPT

## 2022-01-21 PROCEDURE — RECHECK: Performed by: INTERNAL MEDICINE

## 2022-01-21 PROCEDURE — 99291 CRITICAL CARE FIRST HOUR: CPT | Performed by: EMERGENCY MEDICINE

## 2022-01-21 PROCEDURE — 85025 COMPLETE CBC W/AUTO DIFF WBC: CPT | Performed by: INTERNAL MEDICINE

## 2022-01-21 PROCEDURE — 0241U HB NFCT DS VIR RESP RNA 4 TRGT: CPT | Performed by: EMERGENCY MEDICINE

## 2022-01-21 PROCEDURE — 85027 COMPLETE CBC AUTOMATED: CPT | Performed by: EMERGENCY MEDICINE

## 2022-01-21 PROCEDURE — 82805 BLOOD GASES W/O2 SATURATION: CPT | Performed by: EMERGENCY MEDICINE

## 2022-01-21 PROCEDURE — 81001 URINALYSIS AUTO W/SCOPE: CPT | Performed by: EMERGENCY MEDICINE

## 2022-01-21 PROCEDURE — 87086 URINE CULTURE/COLONY COUNT: CPT | Performed by: EMERGENCY MEDICINE

## 2022-01-21 PROCEDURE — 73610 X-RAY EXAM OF ANKLE: CPT

## 2022-01-21 PROCEDURE — 70450 CT HEAD/BRAIN W/O DYE: CPT

## 2022-01-21 PROCEDURE — 36415 COLL VENOUS BLD VENIPUNCTURE: CPT | Performed by: EMERGENCY MEDICINE

## 2022-01-21 PROCEDURE — 87077 CULTURE AEROBIC IDENTIFY: CPT | Performed by: EMERGENCY MEDICINE

## 2022-01-21 RX ORDER — HEPARIN SODIUM 5000 [USP'U]/ML
5000 INJECTION, SOLUTION INTRAVENOUS; SUBCUTANEOUS EVERY 8 HOURS SCHEDULED
Status: DISCONTINUED | OUTPATIENT
Start: 2022-01-21 | End: 2022-01-30 | Stop reason: HOSPADM

## 2022-01-21 RX ORDER — OXYCODONE HYDROCHLORIDE AND ACETAMINOPHEN 5; 325 MG/1; MG/1
1.5 TABLET ORAL EVERY 4 HOURS PRN
Status: DISCONTINUED | OUTPATIENT
Start: 2022-01-21 | End: 2022-01-30 | Stop reason: HOSPADM

## 2022-01-21 RX ORDER — CYCLOBENZAPRINE HCL 10 MG
10 TABLET ORAL 3 TIMES DAILY
Status: DISCONTINUED | OUTPATIENT
Start: 2022-01-21 | End: 2022-01-30 | Stop reason: HOSPADM

## 2022-01-21 RX ORDER — SODIUM CHLORIDE 9 MG/ML
100 INJECTION, SOLUTION INTRAVENOUS CONTINUOUS
Status: DISCONTINUED | OUTPATIENT
Start: 2022-01-21 | End: 2022-01-23

## 2022-01-21 RX ORDER — INSULIN GLARGINE 100 [IU]/ML
50 INJECTION, SOLUTION SUBCUTANEOUS
Status: DISCONTINUED | OUTPATIENT
Start: 2022-01-21 | End: 2022-01-22

## 2022-01-21 RX ORDER — FUROSEMIDE 40 MG/1
40 TABLET ORAL 2 TIMES DAILY
Status: DISCONTINUED | OUTPATIENT
Start: 2022-01-21 | End: 2022-01-21

## 2022-01-21 RX ORDER — SODIUM CHLORIDE, SODIUM GLUCONATE, SODIUM ACETATE, POTASSIUM CHLORIDE, MAGNESIUM CHLORIDE, SODIUM PHOSPHATE, DIBASIC, AND POTASSIUM PHOSPHATE .53; .5; .37; .037; .03; .012; .00082 G/100ML; G/100ML; G/100ML; G/100ML; G/100ML; G/100ML; G/100ML
500 INJECTION, SOLUTION INTRAVENOUS ONCE
Status: COMPLETED | OUTPATIENT
Start: 2022-01-21 | End: 2022-01-21

## 2022-01-21 RX ADMIN — INSULIN LISPRO 15 UNITS: 100 INJECTION, SOLUTION INTRAVENOUS; SUBCUTANEOUS at 18:33

## 2022-01-21 RX ADMIN — FUROSEMIDE 40 MG: 40 TABLET ORAL at 08:14

## 2022-01-21 RX ADMIN — CYCLOBENZAPRINE HYDROCHLORIDE 10 MG: 10 TABLET, FILM COATED ORAL at 08:14

## 2022-01-21 RX ADMIN — HEPARIN SODIUM 5000 UNITS: 5000 INJECTION INTRAVENOUS; SUBCUTANEOUS at 13:50

## 2022-01-21 RX ADMIN — CEFTRIAXONE 2000 MG: 2 INJECTION, POWDER, FOR SOLUTION INTRAMUSCULAR; INTRAVENOUS at 14:11

## 2022-01-21 RX ADMIN — CYCLOBENZAPRINE HYDROCHLORIDE 10 MG: 10 TABLET, FILM COATED ORAL at 22:27

## 2022-01-21 RX ADMIN — DEXTROSE 1000 MG: 50 INJECTION, SOLUTION INTRAVENOUS at 02:28

## 2022-01-21 RX ADMIN — METOPROLOL TARTRATE 25 MG: 25 TABLET, FILM COATED ORAL at 18:31

## 2022-01-21 RX ADMIN — INSULIN LISPRO 15 UNITS: 100 INJECTION, SOLUTION INTRAVENOUS; SUBCUTANEOUS at 11:14

## 2022-01-21 RX ADMIN — INSULIN LISPRO 6 UNITS: 100 INJECTION, SOLUTION INTRAVENOUS; SUBCUTANEOUS at 11:13

## 2022-01-21 RX ADMIN — SODIUM CHLORIDE 100 ML/HR: 0.9 INJECTION, SOLUTION INTRAVENOUS at 13:50

## 2022-01-21 RX ADMIN — METOPROLOL TARTRATE 25 MG: 25 TABLET, FILM COATED ORAL at 08:14

## 2022-01-21 RX ADMIN — INSULIN LISPRO 15 UNITS: 100 INJECTION, SOLUTION INTRAVENOUS; SUBCUTANEOUS at 07:07

## 2022-01-21 RX ADMIN — INSULIN LISPRO 6 UNITS: 100 INJECTION, SOLUTION INTRAVENOUS; SUBCUTANEOUS at 18:33

## 2022-01-21 RX ADMIN — SODIUM CHLORIDE, SODIUM GLUCONATE, SODIUM ACETATE, POTASSIUM CHLORIDE, MAGNESIUM CHLORIDE, SODIUM PHOSPHATE, DIBASIC, AND POTASSIUM PHOSPHATE 500 ML: .53; .5; .37; .037; .03; .012; .00082 INJECTION, SOLUTION INTRAVENOUS at 02:29

## 2022-01-21 RX ADMIN — INSULIN GLARGINE 50 UNITS: 100 INJECTION, SOLUTION SUBCUTANEOUS at 22:27

## 2022-01-21 RX ADMIN — CEFEPIME HYDROCHLORIDE 2000 MG: 2 INJECTION, POWDER, FOR SOLUTION INTRAVENOUS at 19:59

## 2022-01-21 RX ADMIN — HEPARIN SODIUM 5000 UNITS: 5000 INJECTION INTRAVENOUS; SUBCUTANEOUS at 07:05

## 2022-01-21 RX ADMIN — SODIUM CHLORIDE 100 ML/HR: 0.9 INJECTION, SOLUTION INTRAVENOUS at 23:16

## 2022-01-21 RX ADMIN — CYCLOBENZAPRINE HYDROCHLORIDE 10 MG: 10 TABLET, FILM COATED ORAL at 18:31

## 2022-01-21 RX ADMIN — HEPARIN SODIUM 5000 UNITS: 5000 INJECTION INTRAVENOUS; SUBCUTANEOUS at 22:28

## 2022-01-21 RX ADMIN — INSULIN LISPRO 8 UNITS: 100 INJECTION, SOLUTION INTRAVENOUS; SUBCUTANEOUS at 07:06

## 2022-01-21 NOTE — ASSESSMENT & PLAN NOTE
Wt Readings from Last 3 Encounters:   01/20/22 104 kg (229 lb)   12/16/21 104 kg (229 lb 11 5 oz)   07/25/21 111 kg (244 lb 12 8 oz)     · Recent admission with acute on chronic heart failure  · Appears fairly euvolemic at this time  · Continue home cardiac medications  · Monitor daily weights, I/O, volume status  · Low-sodium, fluid-restricted diet; patient admitting some recent dietary indiscretion thus counseling provided

## 2022-01-21 NOTE — H&P
[address] [facility]  H&P- Michelle Singleton Sr  1945, 78 y o  male MRN: [id number]  Unit/Bed#[facility] Encounter: [id number]  Primary Care Provider: Dipika Pham MD   Date and time admitted to hospital: 1/20/2022 11:50 PM    * Sepsis with acute renal failure without septic shock [facility])  Assessment & Plan  · Suspected, tachycardia/tachypnea/leukocytosis on presentation  Patient complaining of urinary urgency thus suspected source is UTI and UA with pyuria/bacteriuria  Additionally with lactic acidosis resolved s/p IV fluids  · Patient hemodynamically stable and nontoxic appearing at this time  · Received ceftriaxone in ED, continue for now pending results of blood cultures x2 and urine culture  · Trend WBC, temperature curve, hemodynamics  · Regarding acute renal failure baseline creatinine appears 1 4  Assess response to IV fluids  Measure PVR/bladder scan  Recheck BMP in a m  Chronic respiratory failure with hypoxia (HCC)  Assessment & Plan  · On 3 L NC continuously and at baseline currently  · Continue supplemental oxygen and titrate as needed to maintain SaO2 88-94%    Essential hypertension  Assessment & Plan  · Continue home antihypertensives with hold parameters, monitor blood pressure per protocol    Chronic diastolic heart failure (HCC)  Assessment & Plan  Wt Readings from Last 3 Encounters:   01/20/22 104 kg (229 lb)   12/16/21 104 kg (229 lb 11 5 oz)   07/25/21 111 kg (244 lb 12 8 oz)     · Recent admission with acute on chronic heart failure  · Appears fairly euvolemic at this time  · Continue home cardiac medications  · Monitor daily weights, I/O, volume status  · Low-sodium, fluid-restricted diet; patient admitting some recent dietary indiscretion thus counseling provided        COPD, severe ([address] )  Assessment & Plan  · Not in acute exacerbation  · Continue Trelegy, p r n   Nebulizers    Type 2 diabetes mellitus with hyperglycemia, with long-term current use of insulin [facility])  Assessment & Plan  Lab Results   Component Value Date    HGBA1C 9 2 (H) 04/14/2021       No results for input(s): POCGLU in the last 72 hours  Blood Sugar Average: Last 72 hrs:  ·  switch to basal bolus while inpatient  · SSI with Accu-Cheks, carb controlled diet  · Initiate hypoglycemia protocol      VTE Prophylaxis: Heparin  / sequential compression device   Code Status: Level 1 - Full Code  POLST: POLST form is not discussed and not completed at this time  Anticipated Length of Stay:  Patient will be admitted on an Inpatient basis with an anticipated length of stay of  Greater than 2 midnights  Justification for Hospital Stay: Please see detailed plans noted above  Chief Complaint:     Generalized weakness  History of Present Illness:  Stu Melendrez Sr  is a 78 y o  male who presented for evaluation of generalized weakness and fall out of bed  Has past medical history significant for chronic diastolic heart failure, severe COPD, chronic hypoxic respiratory failure on continuous 3 L NC, type 2 diabetes on insulin therapy, hypertension  Reports his weakness has been increasing for approximately past 1 week, additionally reporting increasing urinary frequency without dysuria/hematuria, fevers/chills, or abdominal pain/nausea/vomiting/diarrhea during this span  He additionally denies any chest pain/pressure, worsening shortness of breath, cough/wheezing worsened from baseline, or headache  Today he had a fall off of his bed and was unable to arise due to the weakness which prompted call to EMS  On ED arrival he was tachycardic and tachypneic, with labs revealing leukocytosis and acute renal failure thus a septic workup was initiated where patient's UA was with pyuria/bacteriuria for which ceftriaxone was initiated; a COVID-19/influenza/RSV PCR was negative and CXR not consistent with pneumonia    He is thus admitted for further management of sepsis currently suspected secondary to urinary tract infection  Currently, patient is lying in bed, complaining only of feeling somewhat tired appearing  He offers no other acute complaints at this time  Review of Systems:    Constitutional:  Denies fever or chills but endorses fatigue  Eyes:  Denies change in visual acuity   HENT:  Denies nasal congestion or sore throat   Respiratory:  Denies cough or shortness of breath   Cardiovascular:  Denies chest pain or edema   GI:  Denies abdominal pain, nausea, vomiting, bloody stools or diarrhea   :  Denies dysuria but endorsed urinary urgency/frequency  Musculoskeletal:  Denies back pain or joint pain   Integument:  Denies rash   Neurologic:  Denies headache, focal weakness or sensory changes but endorsed generalized weakness  Endocrine:  Denies polyuria or polydipsia   Lymphatic:  Denies swollen glands   Psychiatric:  Denies depression or anxiety     Past Medical and Surgical History:   Past Medical History:   Diagnosis Date    Abdominal pain     Cardiac disease     [facility]est[facility])     COPD, severe ([facility] [facility] )     Coronary artery disease     DDD (degenerative disc disease), lumbar 9/1/2020    Diabetes mellitus ([facility] )     Dyspnea     GERD (gastroesophageal reflux disease)     Hyperlipidemia     Hypertension     MI (myocardial infarction) ([facility] [address] )     with 3 stents    Nodule of apex of right lung     JACQUELINE (obstructive sleep apnea)     Prostate cancer [facility])      Past Surgical History:   Procedure Laterality Date    ABDOMINAL SURGERY      exploratory    ANGIOPLASTY      3 stents    APPENDECTOMY      COLONOSCOPY  2015    CT NEEDLE BIOPSY LUNG  11/3/2020    ESOPHAGOGASTRODUODENOSCOPY N/A 10/2/2017    Procedure: ESOPHAGOGASTRODUODENOSCOPY (EGD); Surgeon: Daniel Samaniego MD;  Location: [facility];   Service: Gastroenterology    IR THORACENTESIS  11/3/2020    KNEE CARTILAGE SURGERY      OTHER SURGICAL HISTORY      stent placement    PROSTATE SURGERY      SKIN GRAFT      Basal cell CA back       Meds/Allergies:    Current Facility-Administered Medications:     cefTRIAXone (ROCEPHIN) 2,000 mg in dextrose 5 % 50 mL IVPB, 2,000 mg, Intravenous, Q24H, [place], DO    cyclobenzaprine (FLEXERIL) tablet 10 mg, 10 mg, Oral, TID, [place], DO    furosemide (LASIX) tablet 40 mg, 40 mg, Oral, BID, [place], DO    heparin (porcine) subcutaneous injection 5,000 Units, 5,000 Units, Subcutaneous, Q8H [address], Angel[place], DO    insulin glargine (LANTUS) subcutaneous injection 50 Units 0 5 mL, 50 Units, Subcutaneous, HS, [place], DO    insulin lispro (HumaLOG) 100 units/mL subcutaneous injection 1-6 Units, 1-6 Units, Subcutaneous, HS, [place], DO    insulin lispro (HumaLOG) 100 units/mL subcutaneous injection 15 Units, 15 Units, Subcutaneous, TID With Meals, [place], DO    insulin lispro (HumaLOG) 100 units/mL subcutaneous injection 2-12 Units, 2-12 Units, Subcutaneous, TID AC **AND** Fingerstick Glucose (POCT), , , TID AC, Angel[place], DO    metoprolol tartrate (LOPRESSOR) tablet 25 mg, 25 mg, Oral, BID, [place], DO    oxyCODONE-acetaminophen (PERCOCET) 5-325 mg per tablet 1 5 tablet, 1 5 tablet, Oral, Q4H PRN, [place], DO    Current Outpatient Medications:     aspirin (ECOTRIN LOW STRENGTH) 81 mg EC tablet, Take 1 tablet by mouth daily (Patient not taking: Reported on 12/12/2021 ), Disp: , Rfl:     BD Insulin Syringe U-500 31G X 6MM 0 5 ML MISC, USE 1 SYRINGE THREE TIMES A DAY FOR INJECTING INSULIN, Disp: 100 each, Rfl: 5    cyclobenzaprine (FLEXERIL) 10 mg tablet, TAKE 1 TABLET BY MOUTH THREE TIMES A DAY, Disp: 60 tablet, Rfl: 0    furosemide (LASIX) 40 mg tablet, TAKE ONE TABLET BY MOUTH TWICE A DAY, Disp: 60 tablet, Rfl: 4    HUMULIN R 500 UNIT/ML CONCENTRATED injection, INJECT 45 UNITS TWICE DAILY BEFORE BREAKFAST AND LUNCH, Disp: 40 mL, Rfl: 3    ketoconazole (NIZORAL) 2 % cream, ketoconazole 2 % topical cream (Patient not taking: Reported on 9/14/2021), Disp: , Rfl:     levalbuterol (XOPENEX) 1 25 mg/3 mL nebulizer solution, USE 1 VIAL IN NEBULIZER THREE TIMES A DAY (Patient not taking: Reported on 12/12/2021), Disp: , Rfl: 5    metoprolol tartrate (LOPRESSOR) 25 mg tablet, TAKE ONE TABLET BY MOUTH TWICE A DAY, Disp: 60 tablet, Rfl: 5    nitroglycerin (NITROSTAT) 0 4 mg SL tablet, Place 1 tablet (0 4 mg total) under the tongue every 5 (five) minutes as needed for chest pain (Patient not taking: Reported on 12/12/2021 ), Disp: 30 tablet, Rfl: 5    Omega-3 Fatty Acids (FISH OIL) 645 MG CAPS, Take 1 capsule by mouth 2 (two) times a day (Patient not taking: Reported on 12/12/2021 ), Disp: , Rfl:     OneTouch Ultra test strip, TEST FOUR TIMES A DAY, Disp: 150 strip, Rfl: 5    oxyCODONE-acetaminophen (PERCOCET) 7 5-325 MG per tablet, Take by mouth every 4 (four) hours  , Disp: , Rfl:     pantoprazole (PROTONIX) 40 mg tablet, TAKE ONE TABLET BY MOUTH EVERY DAY (Patient not taking: Reported on 12/12/2021), Disp: 60 tablet, Rfl: 2    potassium chloride (K-DUR,KLOR-CON) 20 mEq tablet, TAKE ONE TABLET BY MOUTH EVERY DAY, Disp: 60 tablet, Rfl: 2    simvastatin (ZOCOR) 40 mg tablet, TAKE ONE TABLET BY MOUTH EVERY DAY, Disp: 60 tablet, Rfl: 2    tamsulosin (FLOMAX) 0 4 mg, TAKE ONE CAPSULE BY MOUTH EVERY DAY (Patient not taking: Reported on 9/14/2021), Disp: 60 capsule, Rfl: 2    traMADol (ULTRAM) 50 mg tablet, tramadol 50 mg tablet (Patient not taking: Reported on 12/12/2021), Disp: , Rfl:     Trelegy Ellipta 100-62 5-25 MCG/INH inhaler, INHALE 1 PUFF BY MOUTH DAILY *RINSE MOUTH AFTER USE*, Disp: 60 each, Rfl: 3    Allergies:    Allergies   Allergen Reactions    Crestor [Rosuvastatin] Other (See Comments)     Unknown      Metformin GI Intolerance     History:  Marital Status: /Civil Union     Substance Use History:   Social History     Substance and Sexual Activity   Alcohol Use Not Currently     Social History     Tobacco Use Smoking Status Former Smoker    Packs/day: 1 50    Years: 50 00    Pack years: 75 00    Start date: 38    Quit date: 2020    Years since quittin 5   Smokeless Tobacco Never Used     Social History     Substance and Sexual Activity   Drug Use No       Family History:  Family History   Problem Relation Age of Onset    Heart disease Father     Other Father         Mesothelioma        Physical Exam:     Vitals:   Blood Pressure: 125/58 (22)  Pulse: 98 (22)  Temperature: 97 5 °F (36 4 °C) (22)  Temp Source: Oral (22)  Respirations: 18 (22)  Weight - Scale: 104 kg (229 lb) (22)  SpO2: 98 % (22)    Constitutional:  Well developed, well nourished, no acute distress, non-toxic appearance   Eyes:  PERRL, conjunctiva normal   HENT:  Atraumatic, external ears normal, nose normal, oropharynx moist, no pharyngeal exudates  Neck- normal range of motion, no tenderness, supple   Respiratory:  No respiratory distress, normal breath sounds, no rales, no wheezing   Cardiovascular:  Normal rate, normal rhythm, no murmurs, no gallops, no rubs   GI:  Soft, nondistended, normal bowel sounds, nontender, no organomegaly, no mass, no rebound, no guarding   :  No costovertebral angle tenderness   Musculoskeletal:  No edema, no tenderness, no deformities  Back- no tenderness  Integument:  Well hydrated, foul smell around groin with slight erythema in creases  Lymphatic:  No lymphadenopathy noted   Neurologic:  Alert &awake, communicative, CN 2-12 normal, normal motor function, normal sensory function, no focal deficits noted   Psychiatric:  Speech and behavior appropriate       Lab Results: I have personally reviewed pertinent reports        Results from last 7 days   Lab Units 22  0018   WBC Thousand/uL 23 62*   HEMOGLOBIN g/dL 12 2   HEMATOCRIT % 37 7   PLATELETS Thousands/uL 155   LYMPHO PCT % 2*   MONO PCT % 8   EOS PCT % 0     Results from last 7 days   Lab Units 01/21/22  0018   POTASSIUM mmol/L 3 8   CHLORIDE mmol/L 93*   CO2 mmol/L 27   BUN mg/dL 32*   CREATININE mg/dL 2 10*   CALCIUM mg/dL 9 7   ALK PHOS U/L 139*   ALT U/L 13   AST U/L 24           EKG:  Sinus tachycardia , RBBB    Imaging: I have personally reviewed pertinent reports  CT head without contrast    Result Date: 1/21/2022  Narrative: CT BRAIN - WITHOUT CONTRAST INDICATION:   fall, weakness  COMPARISON:  CT head dated 12/2/  TECHNIQUE:  CT examination of the brain was performed  In addition to axial images, sagittal and coronal 2D reformatted images were created and submitted for interpretation  Radiation dose length product (DLP) for this visit:  948 75 mGy-cm   This examination, like all CT scans performed in the [facility], was performed utilizing techniques to minimize radiation dose exposure, including the use of iterative  reconstruction and automated exposure control  IMAGE QUALITY:  Diagnostic  FINDINGS: PARENCHYMA: Decreased attenuation is noted in periventricular and subcortical white matter demonstrating an appearance that is statistically most likely to represent mild microangiopathic change  Gray-white differentiation appears maintained  No CT signs of acute territorial infarction  No intracranial mass, mass effect or midline shift  No acute parenchymal hemorrhage  VENTRICLES AND EXTRA-AXIAL SPACES:  Normal for the patient's age  VISUALIZED ORBITS AND PARANASAL SINUSES:  Unremarkable  CALVARIUM AND EXTRACRANIAL SOFT TISSUES:  Anterior subluxation of the left of the temporomandibular joint is questioned; no calvarial fracture is seen  Otherwise grossly unremarkable          Impression: Anterior subluxation of the left of the temporomandibular joint is questioned; no calvarial fracture or acute intracranial abnormality is seen otherwise  Other findings as above   Workstation performed: [id number]         ** Please Note: [employer] Dictation voice to text software was used in the creation of this document   **

## 2022-01-21 NOTE — ASSESSMENT & PLAN NOTE
Lab Results   Component Value Date    HGBA1C 9 2 (H) 04/14/2021       No results for input(s): POCGLU in the last 72 hours      Blood Sugar Average: Last 72 hrs:  ·  switch to basal bolus while inpatient  · SSI with Accu-Cheks, carb controlled diet  · Initiate hypoglycemia protocol

## 2022-01-21 NOTE — ASSESSMENT & PLAN NOTE
Suspected, tachycardia/tachypnea/leukocytosis on presentation  Patient was complaining of urinary urgency thus suspected source is UTI and UA with pyuria/bacteriuria  Additionally with lactic acidosis resolved s/p IV fluids  · Patient hemodynamically stable and nontoxic appearing at this time  · Received ceftriaxone in ED, continue for now pending results of blood cultures x2 and urine culture  · Trend WBC, temperature curve, hemodynamics  · Regarding acute renal failure baseline creatinine appears 1 4 and he presented with creat of 2 10  · Assess response to IV fluids  Measure PVR/bladder scan  Recheck BMP in a m

## 2022-01-21 NOTE — ED PROVIDER NOTES
History  Chief Complaint   Patient presents with    Hyperglycemia - Symptomatic     pt reports generalized weakness that made him slide to the floor and not get up for two hours, pt has uncontrolled DM with blood glucose 423 during transport, pt reports SOB, pt has history COPD     HPI     77 yo M with past medical history of diabetes, COPD on 3L NC at baseline, CHF, hypertension and hyperlipidemia who presents for evaluation of weakness  Patient states he has had generalized weakness over the past week  He states today he was getting his bed slid to the floor because his legs gave out  Denies head strike or loss of consciousness  Patient was unable to get up off the floor on his own so he called for EMS  Patient states over the past week, he has had increasing shortness of breath  Patient has also had malodorous urine and urinary frequency  Denies any fevers or chills  Denies chest pain or cough  Denies nausea, vomiting, diarrhea, black or bloody bowel movements, or abdominal pain  Patient denies any focal weakness, numbness, visual changes, vertigo or dysarthria  Per EMS, patient's BG was in the 400's  Prior to Admission Medications   Prescriptions Last Dose Informant Patient Reported? Taking?    BD Insulin Syringe U-500 31G X 6MM 0 5 ML MISC   No No   Sig: USE 1 SYRINGE THREE TIMES A DAY FOR INJECTING INSULIN   HUMULIN R 500 UNIT/ML CONCENTRATED injection   No No   Sig: INJECT 45 UNITS TWICE DAILY BEFORE BREAKFAST AND LUNCH   Omega-3 Fatty Acids (FISH OIL) 645 MG CAPS  Self Yes No   Sig: Take 1 capsule by mouth 2 (two) times a day   Patient not taking: Reported on 12/12/2021    OneTouch Ultra test strip   No No   Sig: TEST FOUR TIMES A DAY   Trelechristopher Ellipta 100-62 5-25 MCG/INH inhaler   No No   Sig: INHALE 1 PUFF BY MOUTH DAILY *RINSE MOUTH AFTER USE*   aspirin (ECOTRIN LOW STRENGTH) 81 mg EC tablet  Self Yes No   Sig: Take 1 tablet by mouth daily   Patient not taking: Reported on 12/12/2021 cyclobenzaprine (FLEXERIL) 10 mg tablet   No No   Sig: TAKE 1 TABLET BY MOUTH THREE TIMES A DAY   furosemide (LASIX) 40 mg tablet  Self No No   Sig: TAKE ONE TABLET BY MOUTH TWICE A DAY   ketoconazole (NIZORAL) 2 % cream  Self Yes No   Sig: ketoconazole 2 % topical cream   Patient not taking: Reported on 9/14/2021   levalbuterol (XOPENEX) 1 25 mg/3 mL nebulizer solution  Self Yes No   Sig: USE 1 VIAL IN NEBULIZER THREE TIMES A DAY   Patient not taking: Reported on 12/12/2021   metoprolol tartrate (LOPRESSOR) 25 mg tablet   No No   Sig: TAKE ONE TABLET BY MOUTH TWICE A DAY   nitroglycerin (NITROSTAT) 0 4 mg SL tablet  Self No No   Sig: Place 1 tablet (0 4 mg total) under the tongue every 5 (five) minutes as needed for chest pain   Patient not taking: Reported on 12/12/2021    oxyCODONE-acetaminophen (PERCOCET) 7 5-325 MG per tablet  Self Yes No   Sig: Take by mouth every 4 (four) hours     pantoprazole (PROTONIX) 40 mg tablet   No No   Sig: TAKE ONE TABLET BY MOUTH EVERY DAY   Patient not taking: Reported on 12/12/2021   potassium chloride (K-DUR,KLOR-CON) 20 mEq tablet   No No   Sig: TAKE ONE TABLET BY MOUTH EVERY DAY   simvastatin (ZOCOR) 40 mg tablet   No No   Sig: TAKE ONE TABLET BY MOUTH EVERY DAY   tamsulosin (FLOMAX) 0 4 mg   No No   Sig: TAKE ONE CAPSULE BY MOUTH EVERY DAY   Patient not taking: Reported on 9/14/2021   traMADol (ULTRAM) 50 mg tablet  Self Yes No   Sig: tramadol 50 mg tablet   Patient not taking: Reported on 12/12/2021      Facility-Administered Medications: None       Past Medical History:   Diagnosis Date    Abdominal pain     Cardiac disease     CHF (congestive heart failure) (HCC)     COPD, severe (HCC)     Coronary artery disease     DDD (degenerative disc disease), lumbar 9/1/2020    [facility]     Dyspnea     GERD (gastroesophageal reflux disease)     Hyperlipidemia     Hypertension     [facility] )     with 3 stents    Nodule of apex of right lung     JACQUELINE (obstructive sleep apnea)     Prostate cancer [facility])        Past Surgical History:   Procedure Laterality Date    ABDOMINAL SURGERY      exploratory    ANGIOPLASTY      3 stents    APPENDECTOMY      COLONOSCOPY  2015    CT NEEDLE BIOPSY LUNG  11/3/2020    ESOPHAGOGASTRODUODENOSCOPY N/A 10/2/2017    Procedure: ESOPHAGOGASTRODUODENOSCOPY (EGD); Surgeon: Winnie Sinha MD;  Location:  GI LAB; Service: Gastroenterology    IR THORACENTESIS  11/3/2020    KNEE CARTILAGE SURGERY      OTHER SURGICAL HISTORY      stent placement    PROSTATE SURGERY      SKIN GRAFT      Basal cell CA back       Family History   Problem Relation Age of Onset    Heart disease Father     Other Father         Mesothelioma      I have reviewed and agree with the history as documented  E-Cigarette/Vaping    E-Cigarette Use Never User      E-Cigarette/Vaping Substances    Nicotine No     THC No     CBD No     Flavoring No     Other No     Unknown No      Social History     Tobacco Use    Smoking status: Former Smoker     Packs/day: 1 50     Years: 50 00     Pack years: 75 00     Start date: 38     Quit date: 2020     Years since quittin 5    Smokeless tobacco: Never Used   Vaping Use    Vaping Use: Never used   Substance Use Topics    Alcohol use: Not Currently    Drug use: No        Review of Systems   Constitutional: Negative for appetite change, chills and fever  HENT: Negative for congestion, rhinorrhea and sore throat  Respiratory: Positive for shortness of breath  Negative for cough  Cardiovascular: Negative for chest pain  Gastrointestinal: Negative for abdominal pain, diarrhea, nausea and vomiting  Genitourinary: Positive for frequency and urgency  Negative for dysuria and hematuria  Musculoskeletal: Negative for arthralgias and myalgias  Skin: Negative for rash  Neurological: Positive for weakness  Negative for dizziness, light-headedness, numbness and headaches  All other systems reviewed and are negative  Physical Exam  ED Triage Vitals   Temperature Pulse Respirations Blood Pressure SpO2   01/20/22 2354 01/20/22 2354 01/20/22 2354 01/20/22 2354 01/20/22 2354   97 5 °F (36 4 °C) (!) 114 22 117/62 98 %      Temp Source Heart Rate Source Patient Position - Orthostatic VS BP Location FiO2 (%)   01/20/22 2354 01/20/22 2354 01/20/22 2354 01/20/22 2354 --   Oral Monitor Lying Left arm       Pain Score       01/21/22 1240       No Pain             Orthostatic Vital Signs  Vitals:    01/21/22 0700 01/21/22 0900 01/21/22 1240 01/21/22 1524   BP: 137/67 118/56 135/83 123/77   Pulse: 85 102 (!) 108 91   Patient Position - Orthostatic VS:           Physical Exam  Vitals and nursing note reviewed  Constitutional:       General: He is not in acute distress  Appearance: Normal appearance  He is well-developed  He is obese  He is not ill-appearing, toxic-appearing or diaphoretic  Comments: Chronically ill appearing  HENT:      Head: Normocephalic and atraumatic  Right Ear: External ear normal       Left Ear: External ear normal       Nose: Nose normal       Mouth/Throat:      Mouth: Mucous membranes are moist       Pharynx: Oropharynx is clear  Eyes:      Extraocular Movements: Extraocular movements intact  Conjunctiva/sclera: Conjunctivae normal       Pupils: Pupils are equal, round, and reactive to light  Cardiovascular:      Rate and Rhythm: Regular rhythm  Tachycardia present  Pulses: Normal pulses  Heart sounds: Normal heart sounds  No murmur heard  No friction rub  No gallop  Pulmonary:      Effort: Pulmonary effort is normal  No respiratory distress  Breath sounds: No wheezing or rales  Comments: Decreased breath sounds throughout bilateral lungs  Slightly tachypneic  Abdominal:      General: There is no distension  Palpations: Abdomen is soft  Tenderness: There is no abdominal tenderness   There is no guarding or rebound  Musculoskeletal:         General: No swelling or tenderness  Cervical back: Neck supple  Right lower leg: Edema present  Left lower leg: Edema present  Comments: Trace b/l lower extremity edema  Skin:     General: Skin is warm and dry  Coloration: Skin is not pale  Findings: No erythema or rash  Neurological:      General: No focal deficit present  Mental Status: He is alert and oriented to person, place, and time  Cranial Nerves: No cranial nerve deficit  Sensory: No sensory deficit  Motor: No weakness     Psychiatric:         Mood and Affect: Mood normal          Behavior: Behavior normal          ED Medications  Medications   cyclobenzaprine (FLEXERIL) tablet 10 mg (10 mg Oral Given 1/21/22 1831)   metoprolol tartrate (LOPRESSOR) tablet 25 mg (25 mg Oral Given 1/21/22 1831)   oxyCODONE-acetaminophen (PERCOCET) 5-325 mg per tablet 1 5 tablet (has no administration in time range)   insulin lispro (HumaLOG) 100 units/mL subcutaneous injection 2-12 Units (6 Units Subcutaneous Given 1/21/22 1833)   insulin lispro (HumaLOG) 100 units/mL subcutaneous injection 1-6 Units (has no administration in time range)   heparin (porcine) subcutaneous injection 5,000 Units (5,000 Units Subcutaneous Given 1/21/22 1350)   insulin glargine (LANTUS) subcutaneous injection 50 Units 0 5 mL (has no administration in time range)   insulin lispro (HumaLOG) 100 units/mL subcutaneous injection 15 Units (15 Units Subcutaneous Given 1/21/22 1833)   sodium chloride 0 9 % infusion (100 mL/hr Intravenous New Bag 1/21/22 1350)   cefepime (MAXIPIME) 2,000 mg in dextrose 5 % 50 mL IVPB (has no administration in time range)   ceftriaxone (ROCEPHIN) 1 g/50 mL in dextrose IVPB (0 mg Intravenous Stopped 1/21/22 0258)   multi-electrolyte (ISOLYTE-S PH 7 4) bolus 500 mL (0 mL Intravenous Stopped 1/21/22 0659)       Diagnostic Studies  Results Reviewed     Procedure Component Value Units Date/Time    Blood culture #1 [[id number]]  (Abnormal) Collected: 01/21/22 0226    Lab Status: Preliminary result Specimen: Blood from Arm, Right Updated: 01/21/22 1751     Gram Stain Result Gram negative rods    Basic metabolic panel [[id number]]  (Abnormal) Collected: 01/21/22 1117    Lab Status: Final result Specimen: Blood from Arm, Left Updated: 01/21/22 1154     Sodium 131 mmol/L      Potassium 4 0 mmol/L      Chloride 94 mmol/L      CO2 28 mmol/L      ANION GAP 9 mmol/L      BUN 43 mg/dL      Creatinine 2 70 mg/dL      Glucose 228 mg/dL      Calcium 8 9 mg/dL      eGFR 22 ml/min/1 73sq m     Narrative:      [place] guidelines for Chronic Kidney Disease (CKD):     Stage 1 with normal or high GFR (GFR > 90 mL/min/1 73 square meters)    Stage 2 Mild CKD (GFR = 60-89 mL/min/1 73 square meters)    Stage 3A Moderate CKD (GFR = 45-59 mL/min/1 73 square meters)    Stage 3B Moderate CKD (GFR = 30-44 mL/min/1 73 square meters)    Stage 4 Severe CKD (GFR = 15-29 mL/min/1 73 square meters)    Stage 5 End Stage CKD (GFR <15 mL/min/1 73 square meters)  Note: GFR calculation is accurate only with a steady state creatinine    CBC and differential [[id number]]  (Abnormal) Collected: 01/21/22 1117    Lab Status: Final result Specimen: Blood from Arm, Left Updated: 01/21/22 1128     WBC 20 27 Thousand/uL      RBC 4 41 Million/uL      Hemoglobin 11 5 g/dL      Hematocrit 35 6 %      MCV 81 fL      MCH 26 1 pg      MCHC 32 3 g/dL      RDW 15 5 %      MPV 11 4 fL      Platelets 751 Thousands/uL      nRBC 0 /100 WBCs      Neutrophils Relative 88 %      Immat GRANS % 1 %      Lymphocytes Relative 3 %      Monocytes Relative 8 %      Eosinophils Relative 0 %      Basophils Relative 0 %      Neutrophils Absolute 17 86 Thousands/µL      Immature Grans Absolute 0 21 Thousand/uL      Lymphocytes Absolute 0 55 Thousands/µL      Monocytes Absolute 1 61 Thousand/µL      Eosinophils Absolute 0 01 Thousand/µL Basophils Absolute 0 03 Thousands/µL     Fingerstick Glucose (POCT) [[id number]]  (Abnormal) Collected: 01/21/22 1113    Lab Status: Final result Updated: 01/21/22 1124     POC Glucose 282 mg/dl     Procalcitonin with AM Reflex [[id number]]  (Abnormal) Collected: 01/21/22 0126    Lab Status: Final result Specimen: Blood from Arm, Left Updated: 01/21/22 0807     Procalcitonin 11 61 ng/ml     Procalcitonin Reflex [[id number]]     Lab Status: No result Specimen: Blood     Blood culture #2 [[id number]] Collected: 01/21/22 0226    Lab Status: Preliminary result Specimen: Blood from Arm, Left Updated: 01/21/22 0701     Blood Culture Received in Microbiology Lab  Culture in Progress  Fingerstick Glucose (POCT) [[id number]]  (Abnormal) Collected: 01/21/22 0641    Lab Status: Final result Updated: 01/21/22 0646     POC Glucose 344 mg/dl     HS Troponin I 4hr [[id number]] Collected: 01/21/22 0444    Lab Status: Final result Specimen: Blood from Arm, Left Updated: 01/21/22 0541     hs TnI 4hr 61 ng/L      Delta 4hr hsTnI 10 ng/L     Lactic acid 2 Hours [[id number]]  (Normal) Collected: 01/21/22 0444    Lab Status: Final result Specimen: Blood from Arm, Left Updated: 01/21/22 0530     LACTIC ACID 1 7 mmol/L     Narrative:      Result may be elevated if tourniquet was used during collection  Urine Microscopic [[id number]]  (Abnormal) Collected: 01/21/22 0227    Lab Status: Final result Specimen: Urine, Other Updated: 01/21/22 0356     RBC, UA None Seen /hpf      WBC, UA Innumerable /hpf      Epithelial Cells None Seen /hpf      Bacteria, UA Innumerable /hpf      COARSE GRANULAR CASTS 3-5 /lpf     Urine culture [[id number]] Collected: 01/21/22 0227    Lab Status:  In process Specimen: Urine, Other Updated: 01/21/22 0356    HS Troponin I 2hr [[id number]] Collected: 01/21/22 0227    Lab Status: Final result Specimen: Blood from Arm, Left Updated: 01/21/22 0316     hs TnI 2hr 60 ng/L      Delta 2hr hsTnI 9 ng/L     UA w Reflex to Microscopic w Reflex to Culture [[id number]]  (Abnormal) Collected: 01/21/22 0227    Lab Status: Final result Specimen: Urine, Other Updated: 01/21/22 0247     Color, UA Sangeetha     Clarity, UA Turbid     Specific Gravity, UA 1 025     pH, UA 5 5     Leukocytes, UA Moderate     Nitrite, UA Negative     Protein,  (2+) mg/dl      Glucose, UA >=1000 (1%) mg/dl      Ketones, UA Trace mg/dl      Urobilinogen, UA 1 0 E U /dl      Bilirubin, UA Negative     Blood, UA Large    Lactic acid, plasma [[id number]]  (Abnormal) Collected: 01/21/22 0126    Lab Status: Final result Specimen: Blood from Arm, Left Updated: 01/21/22 0243     LACTIC ACID 2 3 mmol/L     Narrative:      Result may be elevated if tourniquet was used during collection  COVID/FLU/RSV - 2 hour TAT [[id number]]  (Normal) Collected: 01/21/22 0018    Lab Status: Final result Specimen: Nares from Nose Updated: 01/21/22 0140     SARS-CoV-2 Negative     INFLUENZA A PCR Negative     INFLUENZA B PCR Negative     RSV PCR Negative    Narrative:      FOR PEDIATRIC PATIENTS - copy/paste COVID Guidelines URL to browser: [email or web address]    SARS-CoV-2 assay is a Nucleic Acid Amplification assay intended for the  qualitative detection of nucleic acid from SARS-CoV-2 in nasopharyngeal  swabs  Results are for the presumptive identification of SARS-CoV-2 RNA  Positive results are indicative of infection with SARS-CoV-2, the virus  causing COVID-19, but do not rule out bacterial infection or co-infection  with other viruses  Laboratories within the [place] and its  territories are required to report all positive results to the appropriate  public health authorities  Negative results do not preclude SARS-CoV-2  infection and should not be used as the sole basis for treatment or other  patient management decisions   Negative results must be combined with  clinical observations, patient history, and epidemiological information  This test has not been FDA cleared or approved  This test has been authorized by FDA under an Emergency Use Authorization  (EUA)  This test is only authorized for the duration of time the  declaration that circumstances exist justifying the authorization of the  emergency use of an in vitro diagnostic tests for detection of SARS-CoV-2  virus and/or diagnosis of COVID-19 infection under section 564(b)(1) of  the Act, 21 U  S C  122NMJ-3(N)(2), unless the authorization is terminated  or revoked sooner  The test has been validated but independent review by FDA  and CLIA is pending  Test performed using [employer] GeneXpert: This RT-PCR assay targets N2,  a region unique to SARS-CoV-2  A conserved region in the E-gene was chosen  for pan-Sarbecovirus detection which includes SARS-CoV-2  CKMB [[id number]]  (Abnormal) Collected: 01/21/22 0021    Lab Status: Final result Specimen: Blood from Arm, Right Updated: 01/21/22 0128     CK-MB Index 1 2 %      CK-MB 6 7 ng/mL     HS Troponin 0hr (reflex protocol) [[id number]] Collected: 01/21/22 0018    Lab Status: Final result Specimen: Blood from Arm, Right Updated: 01/21/22 0120     hs TnI 0hr 51 ng/L     CBC and differential [[id number]]  (Abnormal) Collected: 01/21/22 0018    Lab Status: Final result Specimen: Blood from Arm, Right Updated: 01/21/22 0113     WBC 23 62 Thousand/uL      RBC 4 68 Million/uL      Hemoglobin 12 2 g/dL      Hematocrit 37 7 %      MCV 81 fL      MCH 26 1 pg      MCHC 32 4 g/dL      RDW 15 5 %      MPV 10 8 fL      Platelets 311 Thousands/uL     Narrative: This is an appended report  These results have been appended to a previously verified report      Manual Differential(PHLEBS Do Not Order) [[id number]]  (Abnormal) Collected: 01/21/22 0018    Lab Status: Final result Specimen: Blood from Arm, Right Updated: 01/21/22 0113     Segmented % 72 %      Bands % 17 %      Lymphocytes % 2 %      Monocytes % 8 % Eosinophils, % 0 %      Basophils % 0 %      Metamyelocytes% 1 %      Absolute Neutrophils 21 02 Thousand/uL      Lymphocytes Absolute 0 47 Thousand/uL      Monocytes Absolute 1 89 Thousand/uL      Eosinophils Absolute 0 00 Thousand/uL      Basophils Absolute 0 00 Thousand/uL      Total Counted --     RBC Morphology Present     Anisocytosis Present     Microcytes Present     Poikilocytes Present     Polychromasia Present     Platelet Estimate Adequate     Artifact Present    Comprehensive metabolic panel [[id number]]  (Abnormal) Collected: 01/21/22 0018    Lab Status: Final result Specimen: Blood from Arm, Right Updated: 01/21/22 0112     Sodium 130 mmol/L      Potassium 3 8 mmol/L      Chloride 93 mmol/L      CO2 27 mmol/L      ANION GAP 10 mmol/L      BUN 32 mg/dL      Creatinine 2 10 mg/dL      Glucose 371 mg/dL      Calcium 9 7 mg/dL      Corrected Calcium 10 9 mg/dL      AST 24 U/L      ALT 13 U/L      Alkaline Phosphatase 139 U/L      Total Protein 7 7 g/dL      Albumin 2 5 g/dL      Total Bilirubin 1 24 mg/dL      eGFR 30 ml/min/1 73sq m     Narrative:      National Kidney Disease Foundation guidelines for Chronic Kidney Disease (CKD):     Stage 1 with normal or high GFR (GFR > 90 mL/min/1 73 square meters)    Stage 2 Mild CKD (GFR = 60-89 mL/min/1 73 square meters)    Stage 3A Moderate CKD (GFR = 45-59 mL/min/1 73 square meters)    Stage 3B Moderate CKD (GFR = 30-44 mL/min/1 73 square meters)    Stage 4 Severe CKD (GFR = 15-29 mL/min/1 73 square meters)    Stage 5 End Stage CKD (GFR <15 mL/min/1 73 square meters)  Note: GFR calculation is accurate only with a steady state creatinine    CK (with reflex to MB) [[id number]]  (Abnormal) Collected: 01/21/22 0021    Lab Status: Final result Specimen: Blood from Arm, Right Updated: 01/21/22 0112     Total  U/L     Blood gas, venous [[id number]]  (Abnormal) Collected: 01/21/22 0018    Lab Status: Final result Specimen: Blood from Arm, Right Updated: 01/21/22 0103     pH, Dwain 7 364     pCO2, Dwain 43 7 mm Hg      pO2, Dwain 100 1 mm Hg      HCO3, Dwain 24 4 mmol/L      Base Excess, Dwain -1 1 mmol/L      O2 Content, Dwain 16 5 ml/dL      O2 HGB, VENOUS 94 8 %                  CT head without contrast   Final Result by Atif Jackson DO (01/21 0127)      Anterior subluxation of the left of the temporomandibular joint is questioned; no calvarial fracture or acute intracranial abnormality is seen otherwise  Other findings as above  Workstation performed: [id number]         XR chest portable   Final Result by Tom Beltran MD (01/21 0802)      Stable right lung volume loss with known right lung base round atelectasis and loculated pleural effusion  Workstation performed: [id number]         XR ankle 3+ vw left    (Results Pending)   US kidney and bladder    (Results Pending)         Procedures  ECG 12 Lead Documentation Only    Date/Time: 1/21/2022 12:54 AM  Performed by: Joanna Verduzco MD  Authorized by: Joanna Verduzco MD     Indications / Diagnosis:  Shortness of breath  ECG reviewed by me, the ED Provider: yes    Patient location:  ED  Previous ECG:     Previous ECG:  Compared to current    Similarity:  No change    Comparison to cardiac monitor: Yes    Interpretation:     Interpretation: abnormal    Rate:     ECG rate:  107    ECG rate assessment: tachycardic    Rhythm:     Rhythm: sinus rhythm    Ectopy:     Ectopy: none    QRS:     QRS axis:  Normal    QRS intervals: Wide  Conduction:     Conduction: abnormal      Abnormal conduction: complete RBBB    ST segments:     ST segments:  Normal  T waves:     T waves: normal            ED Course               Identification of Seniors at Risk      Most Recent Value   (ISAR) Identification of Seniors at Risk    Before the illness or injury that brought you to the Emergency, did you need someone to help you on a regular basis?  0 Filed at: 01/20/2022 4325   In the last 24 hours, have you needed more help than usual? 1 Filed at: 01/20/2022 2358   Have you been hospitalized for one or more nights during the past 6 months? 1 Filed at: 01/20/2022 2358   In general, do you see well? 0 Filed at: 01/20/2022 2358   In general, do you have serious problems with your memory? 0 Filed at: 01/20/2022 2358   Do you take more than three different medications every day? 1 Filed at: 01/20/2022 2358   ISAR Score 3 Filed at: 01/20/2022 2358                 Initial Sepsis Screening     Row Name 01/21/22 0321                Is the patient's history suggestive of a new or worsening infection? Yes (Proceed)  -KO        Suspected source of infection urinary tract infection  -KO        Are two or more of the following signs & symptoms of infection both present and new to the patient? Yes (Proceed)  -KO        Indicate SIRS criteria Tachycardia > 90 bpm;Leukocytosis (WBC > 69041 IJL)  -KO        If the answer is yes to both questions, suspicion of sepsis is present --        If severe sepsis is present AND tissue hypoperfusion perists in the hour after fluid resuscitation or lactate > 4, the patient meets criteria for SEPTIC SHOCK --        Are any of the following organ dysfunction criteria present within 6 hours of suspected infection and SIRS criteria that are NOT considered to be chronic conditions? Yes  -KO        Organ dysfunction Creatinine > 2 0 mg/dL; Lactate > 2 0 mmol/L  -KO        Date of presentation of severe sepsis 01/21/22  -KO        Time of presentation of severe sepsis 0325  -KO        Tissue hypoperfusion persists in the hour after crystalloid fluid administration, evidenced, by either: --        Was hypotension present within one hour of the conclusion of crystalloid fluid administration? --        Date of presentation of septic shock --        Time of presentation of septic shock --              User Key  (r) = Recorded By, (t) = Taken By, (c) = Cosigned By    [id number] Name Provider Type    Ray Bowen Mable Canales MD Resident                          MDM     77 yo M with past medical history of diabetes, COPD on 3L NC at baseline, CHF, hypertension and hyperlipidemia who presents for evaluation of weakness for one week  Differential diagnosis includes: electrolyte abnormality, cardiac, hyperglycemia, intracranial abnormality, COVID, UTI  Will check CBC, CMP, troponin, VBG, CK, EKG, CXR, UA and head CT  WBC 23k with 17% bandemia, patient meets SIRS criteria, will give rocephin for suspected UTI  Will also add lactate and procal and blood cultures  Will give IVF bolus  Lactate 2 3, patient also has MARANDA with creatinine of 2 1  Will call sepsis alert for severe sepsis  UA consistent with UTI  Will admit patient for severe sepsis  Discussed with SLIM for admission  Disposition  Final diagnoses:   UTI (urinary tract infection)   Sepsis ([facility] )     Time reflects when diagnosis was documented in both MDM as applicable and the Disposition within this note     Time User Action Codes Description Comment    1/21/2022  3:15 AM Demetra Gallo Add [N39 0] UTI (urinary tract infection)     1/21/2022  3:15 AM Demetra Gallo Add [A41 9] Sepsis ([facility] )     1/21/2022 12:38 PM CorBulmaro layne Add [N17 9,  N18 9] Acute kidney injury superimposed on chronic kidney disease [facility])       ED Disposition     ED Disposition Condition Date/Time Comment    Admit Stable Fri Jan 21, 2022  3:16 AM Case was discussed with VIKTORIA and the patient's admission status was agreed to be Admission Status: inpatient status to the service of Dr Kiara Upton          Follow-up Information    None         Current Discharge Medication List      CONTINUE these medications which have NOT CHANGED    Details   aspirin (ECOTRIN LOW STRENGTH) 81 mg EC tablet Take 1 tablet by mouth daily      BD Insulin Syringe U-500 31G X 6MM 0 5 ML MISC USE 1 SYRINGE THREE TIMES A DAY FOR INJECTING INSULIN  Qty: 100 each, Refills: 5    Associated Diagnoses: Type 2 diabetes mellitus with hyperglycemia, with long-term current use of insulin ([facility])      cyclobenzaprine (FLEXERIL) 10 mg tablet TAKE 1 TABLET BY MOUTH THREE TIMES A DAY  Qty: 60 tablet, Refills: 0    Associated Diagnoses: Chronic bilateral low back pain, unspecified whether sciatica present      furosemide (LASIX) 40 mg tablet TAKE ONE TABLET BY MOUTH TWICE A DAY  Qty: 60 tablet, Refills: 4    Associated Diagnoses: Acute on chronic diastolic (congestive) heart failure ([facility])      HUMULIN R 500 UNIT/ML CONCENTRATED injection INJECT 45 UNITS TWICE DAILY BEFORE BREAKFAST AND LUNCH  Qty: 40 mL, Refills: 3    Associated Diagnoses: Chronic respiratory failure with hypoxia and hypercapnia ([facility] [address] );  Type 2 diabetes mellitus with complication, with long-term current use of insulin ([facility])      ketoconazole (NIZORAL) 2 % cream ketoconazole 2 % topical cream      levalbuterol (XOPENEX) 1 25 mg/3 mL nebulizer solution USE 1 VIAL IN NEBULIZER THREE TIMES A DAY  Refills: 5      metoprolol tartrate (LOPRESSOR) 25 mg tablet TAKE ONE TABLET BY MOUTH TWICE A DAY  Qty: 60 tablet, Refills: 5    Associated Diagnoses: Coronary artery disease involving native coronary artery of native heart without angina pectoris      nitroglycerin (NITROSTAT) 0 4 mg SL tablet Place 1 tablet (0 4 mg total) under the tongue every 5 (five) minutes as needed for chest pain  Qty: 30 tablet, Refills: 5    Associated Diagnoses: Coronary artery disease involving native coronary artery of native heart without angina pectoris      Omega-3 Fatty Acids (FISH OIL) 645 MG CAPS Take 1 capsule by mouth 2 (two) times a day      OneTouch Ultra test strip TEST FOUR TIMES A DAY  Qty: 150 strip, Refills: 5    Associated Diagnoses: Diabetes mellitus without complication ([facility])      oxyCODONE-acetaminophen (PERCOCET) 7 5-325 MG per tablet Take by mouth every 4 (four) hours        pantoprazole (PROTONIX) 40 mg tablet TAKE ONE TABLET BY MOUTH EVERY DAY  Qty: 60 tablet, Refills: 2    Associated Diagnoses: Gastroesophageal reflux disease      potassium chloride (K-DUR,KLOR-CON) 20 mEq tablet TAKE ONE TABLET BY MOUTH EVERY DAY  Qty: 60 tablet, Refills: 2    Associated Diagnoses: Chronic respiratory failure with hypoxia and hypercapnia (HCC)      simvastatin (ZOCOR) 40 mg tablet TAKE ONE TABLET BY MOUTH EVERY DAY  Qty: 60 tablet, Refills: 2    Associated Diagnoses: Hypercholesterolemia      tamsulosin (FLOMAX) 0 4 mg TAKE ONE CAPSULE BY MOUTH EVERY DAY  Qty: 60 capsule, Refills: 2    Associated Diagnoses: Urinary frequency      traMADol (ULTRAM) 50 mg tablet tramadol 50 mg tablet      Trelegy Ellipta 100-62 5-25 MCG/INH inhaler INHALE 1 PUFF BY MOUTH DAILY *RINSE MOUTH AFTER USE*  Qty: 60 each, Refills: 3    Associated Diagnoses: Chronic obstructive pulmonary disease, unspecified COPD type (HCC)           No discharge procedures on file  PDMP Review       Value Time User    PDMP Reviewed  Yes 1/21/2022  3:32 AM Isma Garrido DO           ED Provider  Attending physically available and evaluated Reginaldo Diamond Sr    I managed the patient along with the ED Attending      Electronically Signed by         Darell Genao MD  01/21/22 2136

## 2022-01-21 NOTE — ASSESSMENT & PLAN NOTE
· Suspected, tachycardia/tachypnea/leukocytosis on presentation  Patient complaining of urinary urgency thus suspected source is UTI and UA with pyuria/bacteriuria  Additionally with lactic acidosis resolved s/p IV fluids  · Patient hemodynamically stable and nontoxic appearing at this time  · Received ceftriaxone in ED, continue for now pending results of blood cultures x2 and urine culture  · Trend WBC, temperature curve, hemodynamics  · Regarding acute renal failure baseline creatinine appears 1 4  Assess response to IV fluids  Measure PVR/bladder scan  Recheck BMP in a m

## 2022-01-21 NOTE — ASSESSMENT & PLAN NOTE
Recent, December 2021  · Seen by ortho, was WBAT LLE CAM boot  · Recommended outpatient f/u 4 weeks which he has not done  Has not been wearing boot  · Spoke with ortho, check xray   May need additional ortho f/u inpatient

## 2022-01-21 NOTE — CONSULTS
Consultation - Nephrology   Ke Ragsdale  78 y o  male MRN: [id number]  Unit/Bed#: [facility] 603-01 Encounter: [id number]    ASSESSMENT AND PLAN:  78 y o  man PMH of CKD G 3a (bl creatinine 1 4-1 6 mg/dL) f/u Dr Justine Reyes in 2018,  obesity, hypertension, DM, CHF, COPD on oxygen at home 2-3 L, CAD,  P/w weakness an increase urinary frequency, was found to have a UTI  Nephrology is consulted for MARANDA    PLAN  #Non-Oliguric KDIGO MARANDA stage 1 on CKD G3a A2   Etiology:  Likely secondary to prerenal in the setting of urosepsis versus ATN   Baseline creatinine:  1 4-1 6 mg/dL   Current creatinine:  2 7 mg/dL   Peak creatinine:  Trending up   UA:  White blood cells and bacteria, no blood, granular casts   Renal imaging :  Ultrasound order   Treatment:   No indication of dialysis at this time will continue to monitor kidney function   Maintain MAP:  Over 65 mmHg if possible/avoid hypoperfusion:  Hold parameters on blood pressure medications   Avoid nephrotoxic agents such as NSAIDs, and IV contrast if possible  Avoid opioids    Adjust medications to GFR   Agree with normal saline 100 mL/hour for now   Plan to switch to more physiologic IV fluids tomorrow to prevent sodium load (Plasmalyte or LR)    #CKD G3aA2  · Baseline creatinine:  1 4-1 6 mg/dL  · Etiology:  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      #Acid-base Disorder   serum HCO3 63JEUE/E    Metabolic alkalosis likely secondary to volume depletion in a   A   IV fluids as above    #Volume status/hypertension:   Volume:  Hypovolemic    Blood pressure:  Normotensive /83mmhg, goal<140/90   Recommend:   On metoprolol 25 mg b i d       # acute on chronic Hyponatremia   Sodium on admission:130  Serum osm:  Order for this afternoon  Urine osm:  Order for 6:00 p m  Urine Sodium:  Order for 6:00 p m    Presumably choric hyponatremia   Etiology:  Chronic hyponatremia exacerbated by volume depletion  Rate of correction: 130>131  Plan:  Agree with  mL/hour for now  Avoid overcorrection: no more than 6-8mEq in the next 24hr, goal </=137  Please monitor UOP  BMP Q 12  Will monitor labs, call renal if SNa+ <129 or >137    #Anemia:   Current hemoglobin:11 5 mg/dL   Treatment:   Transfuse for hemoglobin less than 7 0 per primary service      # urosepsis  · UA with numerous white blood cells and bacteria  · On ceftriaxone        HISTORY OF PRESENT ILLNESS:  Requesting Physician: Kristi Akers MD  Reason for Consult: MARANDA    Nancy Mishra Sr  is a 78 y o  male PMH of CKD G 3a (baseline creatinine 1 4-1 6 mg/dL) follow-up with Dr Garlon Lesch last time seen in 2018,  obesity, hypertension, DM, CHF, COPD on oxygen at home 2-3 L, CAD,  presents with 1 week of weakness an increase urinary frequency  In the ED patient was found to to be tachycardic, slightly hypotensive  He was also found to have an elevated creatinine and abnormal urinalysis  A renal consultation is requested today for assistance in the management of MARANDA    PAST MEDICAL HISTORY:  Past Medical History:   Diagnosis Date    Abdominal pain     Cardiac disease     CHF (congestive heart failure) (HCC)     COPD, severe (HCC)     Coronary artery disease     DDD (degenerative disc disease), lumbar 9/1/2020    Diabetes mellitus ([facility] [address] )     Dyspnea     GERD (gastroesophageal reflux disease)     Hyperlipidemia     Hypertension     MI (myocardial infarction) ([facility] [address] )     with 3 stents    Nodule of apex of right lung     JACQUELINE (obstructive sleep apnea)     Prostate cancer ([facility] [address] )        PAST SURGICAL HISTORY:  Past Surgical History:   Procedure Laterality Date    ABDOMINAL SURGERY      exploratory    ANGIOPLASTY      3 stents    APPENDECTOMY      COLONOSCOPY  2015    CT NEEDLE BIOPSY LUNG  11/3/2020    ESOPHAGOGASTRODUODENOSCOPY N/A 10/2/2017    Procedure: ESOPHAGOGASTRODUODENOSCOPY (EGD);   Surgeon: Valarie Hutchins MD;  Location: [facility];  Service: Gastroenterology    IR THORACENTESIS  11/3/2020    KNEE CARTILAGE SURGERY      OTHER SURGICAL HISTORY      stent placement    PROSTATE SURGERY      SKIN GRAFT      Basal cell CA back       ALLERGIES:  Allergies   Allergen Reactions    Crestor [Rosuvastatin] Other (See Comments)     Unknown      Metformin GI Intolerance       SOCIAL HISTORY:  Social History     Substance and Sexual Activity   Alcohol Use Not Currently     Social History     Substance and Sexual Activity   Drug Use No     Social History     Tobacco Use   Smoking Status Former Smoker    Packs/day: 1 50    Years: 50 00    Pack years: 75 00    Start date: 38    Quit date: 2020    Years since quittin 5   Smokeless Tobacco Never Used       FAMILY HISTORY:  Family History   Problem Relation Age of Onset    Heart disease Father     Other Father         Mesothelioma        MEDICATIONS:    Current Facility-Administered Medications:     cefTRIAXone (ROCEPHIN) 2,000 mg in dextrose 5 % 50 mL IVPB, 2,000 mg, Intravenous, Q24H, Ivar Tanner, DO, Last Rate: 100 mL/hr at 22 1411, 2,000 mg at 22 1411    cyclobenzaprine (FLEXERIL) tablet 10 mg, 10 mg, Oral, TID, Ivar Tanner, DO, 10 mg at 22 0814    heparin (porcine) subcutaneous injection 5,000 Units, 5,000 Units, Subcutaneous, Q8H [facility], Ivar Tanner, DO, 5,000 Units at 22 1350    insulin glargine (LANTUS) subcutaneous injection 50 Units 0 5 mL, 50 Units, Subcutaneous, HS, Ivar Tanner, DO    insulin lispro (HumaLOG) 100 units/mL subcutaneous injection 1-6 Units, 1-6 Units, Subcutaneous, HS, Ivar Tanner, DO    insulin lispro (HumaLOG) 100 units/mL subcutaneous injection 15 Units, 15 Units, Subcutaneous, TID With Meals, Ivar Tanner, DO, 15 Units at 22 1114    insulin lispro (HumaLOG) 100 units/mL subcutaneous injection 2-12 Units, 2-12 Units, Subcutaneous, TID AC, 6 Units at 22 1113 **AND** Fingerstick Glucose (POCT), , , TID AC, Dashawn Tompkins DO    metoprolol tartrate (LOPRESSOR) tablet 25 mg, 25 mg, Oral, BID, Dashawn Tompkins DO, 25 mg at 01/21/22 0557    oxyCODONE-acetaminophen (PERCOCET) 5-325 mg per tablet 1 5 tablet, 1 5 tablet, Oral, Q4H PRN, Dashawn Tompkins DO    sodium chloride 0 9 % infusion, 100 mL/hr, Intravenous, Continuous, Jenny Priest PA-C, Last Rate: 100 mL/hr at 01/21/22 1350, 100 mL/hr at 01/21/22 1350    REVIEW OF SYSTEMS:  Complete 10 point review of systems were obtained and discussed in length with the patient  Complete review of systems were negative / unremarkable except mentioned in the HPI section       Review of Systems - Psychological ROS: negative  Ophthalmic ROS: negative  ENT ROS: negative  Hematological and Lymphatic ROS: negative  Endocrine ROS: negative  Respiratory ROS: no cough, shortness of breath, or wheezing  Cardiovascular ROS: no chest pain or dyspnea on exertion  Gastrointestinal ROS: no abdominal pain, change in bowel habits, or black or bloody stools  Genito-Urinary ROS: positive for - dysuria and urinary frequency/urgency  Musculoskeletal ROS: negative  Neurological ROS: no TIA or stroke symptoms  Dermatological ROS: negative     PHYSICAL EXAM:  Current Weight: Weight - Scale: 104 kg (229 lb)  First Weight: Weight - Scale: 104 kg (229 lb)  Vitals:    01/21/22 1240   BP: 135/83   Pulse: (!) 108   Resp: 20   Temp: 100 3 °F (37 9 °C)   SpO2: 98%       Intake/Output Summary (Last 24 hours) at 1/21/2022 1412  Last data filed at 1/21/2022 1310  Gross per 24 hour   Intake 530 ml   Output --   Net 530 ml     Wt Readings from Last 3 Encounters:   01/21/22 104 kg (229 lb)   12/16/21 104 kg (229 lb 11 5 oz)   07/25/21 111 kg (244 lb 12 8 oz)     Temp Readings from Last 3 Encounters:   01/21/22 100 3 °F (37 9 °C)   12/15/21 97 7 °F (36 5 °C)   09/14/21 97 7 °F (36 5 °C) (Temporal)     BP Readings from Last 3 Encounters:   01/21/22 135/83   12/16/21 133/66   09/14/21 118/68     Pulse Readings from Last 3 Encounters:   01/21/22 (!) 108   12/16/21 80   09/14/21 90        General:  Obese, no acute distress at this time  Skin:  No acute rash, chronic skin changes in LE   Eyes:  No scleral icterus and noninjected  ENT:  , mucous membranes moist  Neck:  no jugular venous distention, no carotid bruits  Back:  No CVA tenderness  Chest:  Breath sounds decreased in both lungs , no crackles   CVS:  Regular rate and rhythm without a murmur rub   Abdomen:  , soft and nontender   Extremities:  No LE edema   Neuro:  No gross focality  Psych:  Alert and cooperative       Invasive Devices:      Lab Results:   Results from last 7 days   Lab Units 01/21/22  1117 01/21/22  0018   WBC Thousand/uL 20 27* 23 62*   HEMOGLOBIN g/dL 11 5* 12 2   HEMATOCRIT % 35 6* 37 7   PLATELETS Thousands/uL 178 155   POTASSIUM mmol/L 4 0 3 8   CHLORIDE mmol/L 94* 93*   CO2 mmol/L 28 27   BUN mg/dL 43* 32*   CREATININE mg/dL 2 70* 2 10*   CALCIUM mg/dL 8 9 9 7       Other Studies:   CT head without contrast   Final Result by Norberto Chapa DO (01/21 0127)      Anterior subluxation of the left of the temporomandibular joint is questioned; no calvarial fracture or acute intracranial abnormality is seen otherwise  Other findings as above  Workstation performed: [id number]         XR chest portable   Final Result by Stefan Parikh MD (01/21 0802)      Stable right lung volume loss with known right lung base round atelectasis and loculated pleural effusion  Workstation performed: [id number]         XR ankle 3+ vw left    (Results Pending)       Portions of the record may have been created with voice recognition software  Occasional wrong word or "sound a like" substitutions may have occurred due to the inherent limitations of voice recognition software  Read the chart carefully and recognize, using context, where substitutions have occurred

## 2022-01-21 NOTE — PLAN OF CARE
Problem: MOBILITY - ADULT  Goal: Maintain or return to baseline ADL function  Description: INTERVENTIONS:  -  Assess patient's ability to carry out ADLs; assess patient's baseline for ADL function and identify physical deficits which impact ability to perform ADLs (bathing, care of mouth/teeth, toileting, grooming, dressing, etc )  - Assess/evaluate cause of self-care deficits   - Assess range of motion  - Assess patient's mobility; develop plan if impaired  - Assess patient's need for assistive devices and provide as appropriate  - Encourage maximum independence but intervene and supervise when necessary  - Involve family in performance of ADLs  - Assess for home care needs following discharge   - Consider OT consult to assist with ADL evaluation and planning for discharge  - Provide patient education as appropriate  Outcome: Progressing  Goal: Maintains/Returns to pre admission functional level  Description: INTERVENTIONS:  - Perform BMAT or MOVE assessment daily    - Set and communicate daily mobility goal to care team and patient/family/caregiver  - Collaborate with rehabilitation services on mobility goals if [id number]  - Perform Range of Motion 3 times a day  - Reposition patient every 3 hours    - Dangle patient 3 times a day  - Stand patient 3 times a day  - Ambulate patient 3 times a day  - Out of bed to chair 3 times a day   - Out of bed for meals 3  Problem: INFECTION - ADULT  Goal: Absence or prevention of progression during hospitalization  Description: INTERVENTIONS:  - Assess and monitor for signs and symptoms of infection  - Monitor lab/diagnostic results  - Monitor all insertion sites, i e  indwelling lines, tubes, and drains  - Monitor endotracheal if appropriate and nasal secretions for changes in amount and color  - [place] appropriate cooling/warming therapies per order  - Administer medications as ordered  - Instruct and encourage patient and family to use good hand hygiene technique  - Identify and instruct in appropriate isolation precautions for identified infection/condition  Outcome: Progressing  Goal: Absence of fever/infection during neutropenic period  Description: INTERVENTIONS:  - Monitor WBC    Outcome: Progressing     Problem: PAIN - ADULT  Goal: Verbalizes/displays adequate comfort level or baseline comfort level  Description: Interventions:  - Encourage patient to monitor pain and request assistance  - Assess pain using appropriate pain scale  - Administer analgesics based on type and severity of pain and evaluate response  - Implement non-pharmacological measures as appropriate and evaluate response  - Consider cultural and social influences on pain and pain management  - Notify physician/advanced practitioner if interventions unsuccessful or patient reports new pain  Outcome: Progressing    times a day  - Out of bed for toileting  - Record patient progress and toleration of activity level   Outcome: Progressing

## 2022-01-21 NOTE — QUICK NOTE
Informed by nurse of +ve blood culture growing GNR  Patient admitted with UTI, tmax of 100 3, still some tachypnea, LA resolved  Cr got worse  No previous abnormal cultures in chart  Will change Abx from rocephin to cefepime pending finalization of cultures  Underwent Renal US - pending report   Can consider CT abdomen pelvis if clinically not improving

## 2022-01-21 NOTE — ASSESSMENT & PLAN NOTE
· On 3 L NC continuously and at baseline currently  · Continue supplemental oxygen and titrate as needed to maintain SaO2 88-94%

## 2022-01-21 NOTE — ASSESSMENT & PLAN NOTE
Wt Readings from Last 3 Encounters:   01/20/22 104 kg (229 lb)   12/16/21 104 kg (229 lb 11 5 oz)   07/25/21 111 kg (244 lb 12 8 oz)     Recent admission with acute on chronic heart failure  Appears fairly euvolemic at this time despite imaging showing loculated pleural effusion on admission     · Continue home cardiac medications  · Home lasix was ordered on admission however will hold for right now given MARANDA  · Monitor daily weights, I/O, volume status  · Low-sodium, fluid-restricted diet; patient admitted to some recent dietary indiscretion thus counseling provided

## 2022-01-21 NOTE — ED ATTENDING ATTESTATION
1/20/2022  IChristiano MD, saw and evaluated the patient  I have discussed the patient with the resident/non-physician practitioner and agree with the resident's/non-physician practitioner's findings, Plan of Care, and MDM as documented in the resident's/non-physician practitioner's note, except where noted  All available labs and Radiology studies were reviewed  I was present for key portions of any procedure(s) performed by the resident/non-physician practitioner and I was immediately available to provide assistance  At this point I agree with the current assessment done in the Emergency Department  I have conducted an independent evaluation of this patient a history and physical is as follows:    ED Course         Critical Care Time  CriticalCare Time  Performed by: Christiano Rodriguez MD  Authorized by: Christiano Rodriguez MD     Critical care provider statement:     Critical care time (minutes):  36    Critical care time was exclusive of:  Separately billable procedures and treating other patients and teaching time    Critical care was necessary to treat or prevent imminent or life-threatening deterioration of the following conditions:  Sepsis and dehydration    Critical care was time spent personally by me on the following activities:  Re-evaluation of patient's condition, review of old charts, evaluation of patient's response to treatment, development of treatment plan with patient or surrogate, obtaining history from patient or surrogate and examination of patient      77 yo male with dm, copd on oxygen, c/o increased weakness for one week and today fell out of bed because legs felt weak  No loc, no head trauma  Pt on floor for two hours, unable to get up  Pt with increased sob for one week, no cough, no fever  Pt with increased urinary frequency  No n/v/d  Vss, afebrile, tachypnea, tachycardia, lungs diminished, rrr, abdomen soft nontender, trace pedal edema, no neuro deficits   Urine, labs, rule out dka, ivf, trop, ekg, cxr, ct head, covid swab

## 2022-01-21 NOTE — ASSESSMENT & PLAN NOTE
Lab Results   Component Value Date    EGFR 30 01/21/2022    EGFR 47 12/16/2021    EGFR 49 12/15/2021    CREATININE 2 10 (H) 01/21/2022    CREATININE 1 45 (H) 12/16/2021    CREATININE 1 39 (H) 12/15/2021   POA creatinine 2 10, baseline around 1 2-1 4  · Etiology suspected to be from sepsis/dehdyration   S/P IVF on admission  · Will also ensure no retention by checking bladder scan etc  · Avoid hypotension  · Hold lasix (got AM dose on 1/21 unfortunately)  · Pending repeat BMP may give additional IVF

## 2022-01-21 NOTE — PROGRESS NOTES
[address]   1945, 78 y o  male MRN: [id number]  Unit/Bed#[facility] [place] Encounter: [id number]  Primary Care Provider: Kay Elizabeth MD   Date and time admitted to hospital: 1/20/2022 11:50 PM    [facility] Internal Medicine Post Admit Check:     Date of visit: 01/21/22    The patient was admitted earlier to the [facility] Internal Medicine Service  Please see initial intake history and physical for detailed admission history  This is a supplemental update following a post admit checkup  Subjective: doing okay  Very uncomfortable on ER stretcher  Having some dysuria  Breathing stable  Oral intake has been poor  Exam:   Gen: disheveled appearance, obese, on oxygen  CV: RRR  Lungs: decreased, no wheezes  Abd: soft, non-tender    Declined family update  Assessment and Plan:   ·   * Sepsis with acute renal failure without septic shock [facility])  Assessment & Plan  Suspected, tachycardia/tachypnea/leukocytosis on presentation  Patient was complaining of urinary urgency thus suspected source is UTI and UA with pyuria/bacteriuria  Additionally with lactic acidosis resolved s/p IV fluids  · Patient hemodynamically stable and nontoxic appearing at this time  · Received ceftriaxone in ED, continue for now pending results of blood cultures x2 and urine culture  · Trend WBC, temperature curve, hemodynamics  · Regarding acute renal failure baseline creatinine appears 1 4 and he presented with creat of 2 10  · Assess response to IV fluids  Measure PVR/bladder scan  Recheck BMP in a m      Acute kidney injury superimposed on chronic kidney disease [facility])  Assessment & Plan  Lab Results   Component Value Date    EGFR 30 01/21/2022    EGFR 47 12/16/2021    EGFR 49 12/15/2021    CREATININE 2 10 (H) 01/21/2022    CREATININE 1 45 (H) 12/16/2021    CREATININE 1 39 (H) 12/15/2021   POA creatinine 2 10, baseline around 1 2-1 4  · Etiology suspected to be from sepsis/dehdyration  S/P IVF on admission  · Will also ensure no retention by checking bladder scan etc  · Avoid hypotension  · Hold lasix (got AM dose on 1/21 unfortunately)  · Pending repeat BMP may give additional IVF    Adenocarcinoma of lung [facility])  [address] with radiation oncology as outpatient, most recent office note reviewed from 9/2021  · He had CTA study on July 23, 2021--revealed no evidence of any disease  · Recommended to have repeat CT chest January 2022 and f/u as outpatienet  · Continue to recommend outpatient f/u    COPD, severe [facility])  Assessment & Plan  Not in acute exacerbation  · Continue Trelegy, p r n  Nebulizers    Fracture of navicular bone of left foot  Assessment & Plan  Recent, December 2021  · Seen by ortho, was WBAT LLE CAM boot  · Recommended outpatient f/u 4 weeks which he has not done  Has not been wearing boot  · Spoke with ortho, check xray  May need additional ortho f/u inpatient     Chronic respiratory failure with hypoxia ([facility] [address] )  Assessment & Plan  On 3 L NC continuously and at baseline currently  · Continue supplemental oxygen and titrate as needed to maintain SaO2 88-94%  · CXR with stable findings     Essential hypertension  Assessment & Plan  · Continue home antihypertensives with hold parameters, monitor blood pressure per protocol  · Hold lasix    Chronic diastolic heart failure (HCC)  Assessment & Plan  Wt Readings from Last 3 Encounters:   01/20/22 104 kg (229 lb)   12/16/21 104 kg (229 lb 11 5 oz)   07/25/21 111 kg (244 lb 12 8 oz)     Recent admission with acute on chronic heart failure  Appears fairly euvolemic at this time despite imaging showing loculated pleural effusion on admission     · Continue home cardiac medications  · Home lasix was ordered on admission however will hold for right now given MARANDA  · Monitor daily weights, I/O, volume status  · Low-sodium, fluid-restricted diet; patient admitted to some recent dietary indiscretion thus counseling provided        Hyponatremia  Assessment & Plan  Suspect in setting of hyperglycemia, dehydration  · IVF as above  · Monitor BMP    Type 2 diabetes mellitus with hyperglycemia, with long-term current use of insulin (HCC)  Assessment & Plan  Lab Results   Component Value Date    HGBA1C 9 2 (H) 04/14/2021       Recent Labs     01/21/22  0641   POCGLU 344*       Blood Sugar Average: Last 72 hrs:  · (P) 344   · A1C poorly controlled, with hyperglycemia on admission  · Home regimen: U500  · Was switched to basal/bolus while inpatient--continue to titrate PRN  · SSI with Accu-Cheks, carb controlled diet  · Initiate hypoglycemia protocol    Yoly Alejo PA-C

## 2022-01-21 NOTE — ASSESSMENT & PLAN NOTE
On 3 L NC continuously and at baseline currently  · Continue supplemental oxygen and titrate as needed to maintain SaO2 88-94%  · CXR with stable findings

## 2022-01-21 NOTE — ASSESSMENT & PLAN NOTE
Lab Results   Component Value Date    HGBA1C 9 2 (H) 04/14/2021       Recent Labs     01/21/22  0641   POCGLU 344*       Blood Sugar Average: Last 72 hrs:  · (P) 344   · A1C poorly controlled, with hyperglycemia on admission  · Home regimen: U500  · Was switched to basal/bolus while inpatient--continue to titrate PRN  · SSI with Accu-Cheks, carb controlled diet  · Initiate hypoglycemia protocol

## 2022-01-21 NOTE — ASSESSMENT & PLAN NOTE
· Continue home antihypertensives with hold parameters, monitor blood pressure per protocol  · Hold lasix

## 2022-01-21 NOTE — PROGRESS NOTES
OP CM SW received ADT alert regarding patient  Chart review completed  Presented to [facility] for continued weakness, SOB, pain when urinating  Found to have UTI/ sepsis  Hx of COPD  Appears disheveled  Awaiting bed , presently in ED   ABEBA Elliott had attempted outreach a few days ago to assist patient with "help relocating" however he did not  and there was no mailbox available  ABEBA CM will continue to follow & be available as needed

## 2022-01-21 NOTE — ASSESSMENT & PLAN NOTE
Follows with radiation oncology as outpatient, most recent office note reviewed from 9/2021  · He had CTA study on July 23, 2021--revealed no evidence of any disease    · Recommended to have repeat CT chest January 2022 and f/u as outpatienet  · Continue to recommend outpatient f/u

## 2022-01-22 PROBLEM — R78.81 BACTEREMIA: Status: ACTIVE | Noted: 2022-01-22

## 2022-01-22 LAB
ANION GAP SERPL CALCULATED.3IONS-SCNC: 10 MMOL/L (ref 4–13)
ANION GAP SERPL CALCULATED.3IONS-SCNC: 6 MMOL/L (ref 4–13)
BUN SERPL-MCNC: 55 MG/DL (ref 5–25)
BUN SERPL-MCNC: 64 MG/DL (ref 5–25)
CALCIUM SERPL-MCNC: 8.2 MG/DL (ref 8.3–10.1)
CALCIUM SERPL-MCNC: 9.7 MG/DL (ref 8.3–10.1)
CHLORIDE SERPL-SCNC: 101 MMOL/L (ref 100–108)
CHLORIDE SERPL-SCNC: 96 MMOL/L (ref 100–108)
CO2 SERPL-SCNC: 23 MMOL/L (ref 21–32)
CO2 SERPL-SCNC: 27 MMOL/L (ref 21–32)
CREAT SERPL-MCNC: 2.76 MG/DL (ref 0.6–1.3)
CREAT SERPL-MCNC: 2.8 MG/DL (ref 0.6–1.3)
ERYTHROCYTE [DISTWIDTH] IN BLOOD BY AUTOMATED COUNT: 15.2 % (ref 11.6–15.1)
GFR SERPL CREATININE-BSD FRML MDRD: 21 ML/MIN/1.73SQ M
GFR SERPL CREATININE-BSD FRML MDRD: 21 ML/MIN/1.73SQ M
GLUCOSE SERPL-MCNC: 144 MG/DL (ref 65–140)
GLUCOSE SERPL-MCNC: 178 MG/DL (ref 65–140)
GLUCOSE SERPL-MCNC: 217 MG/DL (ref 65–140)
GLUCOSE SERPL-MCNC: 310 MG/DL (ref 65–140)
GLUCOSE SERPL-MCNC: 317 MG/DL (ref 65–140)
GLUCOSE SERPL-MCNC: 354 MG/DL (ref 65–140)
GLUCOSE SERPL-MCNC: 419 MG/DL (ref 65–140)
HCT VFR BLD AUTO: 35.1 % (ref 36.5–49.3)
HGB BLD-MCNC: 11.3 G/DL (ref 12–17)
MCH RBC QN AUTO: 25.9 PG (ref 26.8–34.3)
MCHC RBC AUTO-ENTMCNC: 32.2 G/DL (ref 31.4–37.4)
MCV RBC AUTO: 80 FL (ref 82–98)
PLATELET # BLD AUTO: 180 THOUSANDS/UL (ref 149–390)
PMV BLD AUTO: 10.9 FL (ref 8.9–12.7)
POTASSIUM SERPL-SCNC: 3.6 MMOL/L (ref 3.5–5.3)
POTASSIUM SERPL-SCNC: 3.7 MMOL/L (ref 3.5–5.3)
PROCALCITONIN SERPL-MCNC: 17.47 NG/ML
RBC # BLD AUTO: 4.37 MILLION/UL (ref 3.88–5.62)
SODIUM SERPL-SCNC: 129 MMOL/L (ref 136–145)
SODIUM SERPL-SCNC: 134 MMOL/L (ref 136–145)
WBC # BLD AUTO: 19.98 THOUSAND/UL (ref 4.31–10.16)

## 2022-01-22 PROCEDURE — 80048 BASIC METABOLIC PNL TOTAL CA: CPT | Performed by: INTERNAL MEDICINE

## 2022-01-22 PROCEDURE — 99223 1ST HOSP IP/OBS HIGH 75: CPT | Performed by: INTERNAL MEDICINE

## 2022-01-22 PROCEDURE — 85027 COMPLETE CBC AUTOMATED: CPT | Performed by: INTERNAL MEDICINE

## 2022-01-22 PROCEDURE — 97163 PT EVAL HIGH COMPLEX 45 MIN: CPT

## 2022-01-22 PROCEDURE — 82948 REAGENT STRIP/BLOOD GLUCOSE: CPT

## 2022-01-22 PROCEDURE — 99232 SBSQ HOSP IP/OBS MODERATE 35: CPT | Performed by: INTERNAL MEDICINE

## 2022-01-22 PROCEDURE — 84145 PROCALCITONIN (PCT): CPT | Performed by: EMERGENCY MEDICINE

## 2022-01-22 PROCEDURE — 97167 OT EVAL HIGH COMPLEX 60 MIN: CPT

## 2022-01-22 RX ORDER — METOPROLOL TARTRATE 50 MG/1
50 TABLET, FILM COATED ORAL 2 TIMES DAILY
Status: DISCONTINUED | OUTPATIENT
Start: 2022-01-22 | End: 2022-01-28

## 2022-01-22 RX ORDER — INSULIN GLARGINE 100 [IU]/ML
70 INJECTION, SOLUTION SUBCUTANEOUS
Status: DISCONTINUED | OUTPATIENT
Start: 2022-01-22 | End: 2022-01-23

## 2022-01-22 RX ADMIN — OXYCODONE HYDROCHLORIDE AND ACETAMINOPHEN 1.5 TABLET: 5; 325 TABLET ORAL at 21:24

## 2022-01-22 RX ADMIN — CYCLOBENZAPRINE HYDROCHLORIDE 10 MG: 10 TABLET, FILM COATED ORAL at 21:24

## 2022-01-22 RX ADMIN — INSULIN LISPRO 1 UNITS: 100 INJECTION, SOLUTION INTRAVENOUS; SUBCUTANEOUS at 21:26

## 2022-01-22 RX ADMIN — METOPROLOL TARTRATE 50 MG: 50 TABLET, FILM COATED ORAL at 17:20

## 2022-01-22 RX ADMIN — CYCLOBENZAPRINE HYDROCHLORIDE 10 MG: 10 TABLET, FILM COATED ORAL at 08:26

## 2022-01-22 RX ADMIN — INSULIN GLARGINE 70 UNITS: 100 INJECTION, SOLUTION SUBCUTANEOUS at 21:25

## 2022-01-22 RX ADMIN — HEPARIN SODIUM 5000 UNITS: 5000 INJECTION INTRAVENOUS; SUBCUTANEOUS at 14:35

## 2022-01-22 RX ADMIN — INSULIN LISPRO 12 UNITS: 100 INJECTION, SOLUTION INTRAVENOUS; SUBCUTANEOUS at 12:31

## 2022-01-22 RX ADMIN — INSULIN LISPRO 4 UNITS: 100 INJECTION, SOLUTION INTRAVENOUS; SUBCUTANEOUS at 17:20

## 2022-01-22 RX ADMIN — HEPARIN SODIUM 5000 UNITS: 5000 INJECTION INTRAVENOUS; SUBCUTANEOUS at 05:34

## 2022-01-22 RX ADMIN — INSULIN LISPRO 10 UNITS: 100 INJECTION, SOLUTION INTRAVENOUS; SUBCUTANEOUS at 08:27

## 2022-01-22 RX ADMIN — HEPARIN SODIUM 5000 UNITS: 5000 INJECTION INTRAVENOUS; SUBCUTANEOUS at 21:25

## 2022-01-22 RX ADMIN — CEFEPIME HYDROCHLORIDE 2000 MG: 2 INJECTION, POWDER, FOR SOLUTION INTRAVENOUS at 18:28

## 2022-01-22 RX ADMIN — INSULIN LISPRO 5 UNITS: 100 INJECTION, SOLUTION INTRAVENOUS; SUBCUTANEOUS at 00:32

## 2022-01-22 RX ADMIN — CYCLOBENZAPRINE HYDROCHLORIDE 10 MG: 10 TABLET, FILM COATED ORAL at 17:20

## 2022-01-22 RX ADMIN — SODIUM CHLORIDE 100 ML/HR: 0.9 INJECTION, SOLUTION INTRAVENOUS at 19:47

## 2022-01-22 RX ADMIN — METOPROLOL TARTRATE 50 MG: 50 TABLET, FILM COATED ORAL at 08:26

## 2022-01-22 NOTE — ASSESSMENT & PLAN NOTE
Lab Results   Component Value Date    EGFR 21 01/22/2022    EGFR 21 01/21/2022    EGFR 22 01/21/2022    CREATININE 2 76 (H) 01/22/2022    CREATININE 2 76 (H) 01/21/2022    CREATININE 2 70 (H) 01/21/2022   POA creatinine 2 10, baseline around 1 2-1 4  · Etiology suspected to be from sepsis/dehdyration   S/P IVF on admission  · Unfortunately creatinine continues to trend up, consulted renal for input   · Continue to ensure no retention by checking bladder scan, PVRs  · Avoid hypotension  · Holding lasix  · Renal U/S without hydro  · On IVF

## 2022-01-22 NOTE — PLAN OF CARE
Problem: PHYSICAL THERAPY ADULT  Goal: Performs mobility at highest level of function for planned discharge setting  See evaluation for individualized goals  Description: Treatment/Interventions: Functional transfer training,LE strengthening/ROM,Elevations,Therapeutic exercise,Endurance training,Equipment eval/education,Bed mobility,Gait training,Spoke to nursing,OT  Equipment Recommended: Stephanie Nava       See flowsheet documentation for full assessment, interventions and recommendations  Note: Prognosis: Fair  Problem List: Decreased strength,Decreased endurance,Impaired balance,Decreased mobility,Decreased cognition,Decreased safety awareness,Obesity  Assessment: Pt is 78 y o  male admitted with hx of generalized weakness and Dx of Sepsis with acute renal failure without septic shock  Pt 's comorbidities affecting POC include: Cardiac disease, CHF (congestive heart failure) ([facility] [address] ), COPD, severe ([facility] [address] ), Coronary artery disease, DDD (degenerative disc disease), lumbar, Diabetes mellitus ([facility] [address] ), Dyspnea, GERD (gastroesophageal reflux disease), Hypertension, MI (myocardial infarction) ([facility] [address] ), and JACQUELINE (obstructive sleep apnea) and personal factors of: VINI and ? level of support available at home  Pt's clinical presentation is currently unstable/unpredictable which is evident in ongoing telem monitoring, abn lab values and inability to progress further w/ mobilization due to pt's overall status, incl cognitive issues  Pt presents w/ generalized weakness, incl decreased LE strength, decreased functional endurance and activity tolerance, impaired balance w/ associated gait deviations ( a few steps at bedside; rw was utilized) and fall risk  Will cont to follow pt in PT for progressive mobilization to address above functional deficits and to max level of (I), endurance, and safety  Currently, recommend rehab upon D/C as pt's overall mobility status appears to be below premorbid level   Will cont to follow until then   Barriers to Discharge: Decreased caregiver support        PT Discharge Recommendation: Post acute rehabilitation services          See flowsheet documentation for full assessment

## 2022-01-22 NOTE — ASSESSMENT & PLAN NOTE
Continue lopressor however will increase to 50 mg BID given ongoing tachycardia  · Hold lasix given MARANDA

## 2022-01-22 NOTE — ASSESSMENT & PLAN NOTE
On 3 L NC continuously at baseline  · Continue supplemental oxygen and titrate as needed to maintain SaO2 88-94%  · CXR with stable findings

## 2022-01-22 NOTE — OCCUPATIONAL THERAPY NOTE
Occupational Therapy Evaluation     Patient Name: Ranjan Rivers  Today's Date: 1/22/2022  Problem List  Principal Problem:    Sepsis with acute renal failure without septic shock (HCC)  Active Problems:    Type 2 diabetes mellitus with hyperglycemia, with long-term current use of insulin (HCC)    COPD, severe (HCC)    Hyponatremia    Chronic diastolic heart failure (HCC)    Essential hypertension    Chronic respiratory failure with hypoxia (HCC)    Fracture of navicular bone of left foot    Adenocarcinoma of lung (HCC)    Acute kidney injury superimposed on chronic kidney disease ([facility] [address] )    Bacteremia    Past Medical History  Past Medical History:   Diagnosis Date    Abdominal pain     Cardiac disease     CHF (congestive heart failure) (HCC)     COPD, severe (HCC)     Coronary artery disease     DDD (degenerative disc disease), lumbar 9/1/2020    Diabetes mellitus ([facility] [address] )     Dyspnea     GERD (gastroesophageal reflux disease)     Hyperlipidemia     Hypertension     [facility]     with 3 stents    Nodule of apex of right lung     JACQUELINE (obstructive sleep apnea)     Prostate cancer [facility])      Past Surgical History  Past Surgical History:   Procedure Laterality Date    ABDOMINAL SURGERY      exploratory    ANGIOPLASTY      3 stents    APPENDECTOMY      COLONOSCOPY  2015    CT NEEDLE BIOPSY LUNG  11/3/2020    ESOPHAGOGASTRODUODENOSCOPY N/A 10/2/2017    Procedure: ESOPHAGOGASTRODUODENOSCOPY (EGD); Surgeon: Daniel Samaniego MD;  [facility]: [facility]; Service: Gastroenterology    IR THORACENTESIS  11/3/2020    KNEE CARTILAGE SURGERY      OTHER SURGICAL HISTORY      stent placement    PROSTATE SURGERY      SKIN GRAFT      Basal cell CA back           01/22/22 1007   OT Last Visit   OT Visit Date 01/22/22   Note Type   Note type Evaluation   Restrictions/Precautions   Weight Bearing Precautions Per Order No   Other Precautions Chair Alarm; Bed Alarm; Fall Risk;Cognitive;O2 Pain Assessment   Pain Assessment Tool 0-10   Pain Score No Pain   Home Living   Type of Home House   Home Layout Two level; Able to live on main level with bedroom/bathroom;Stairs to enter with rails  (1 VINI)   Home Equipment Walker;Cane   Prior Function   Level of [place] Independent with ADLs and functional mobility   Lives With Son  (works during the day )   [address] 34 in the last 6 months 1 to 4   Vocational Retired   [address] and mobility -admits to 1 fall - home alone during the day - shares homemaking with son    Reciprocal Relationships supportive family    Service to Others retired   Intrinsic Gratification sedentary    Subjective   Subjective offers no c/o    ADL   Eating Assistance 7  Independent   Grooming Assistance 5  Supervision/Setup   [address] 4  Minimal Assistance   [address] 3  Moderate Assistance   [address] 4  [address] 3  Moderate Assistance   [address]  3  Moderate Assistance   Bed Mobility   Additional Comments sitting on EOB on approach    Transfers   Sit to Stand 4  Minimal assistance   Stand to Sit 4  Minimal assistance   Stand pivot 4  Minimal assistance   Functional Mobility   Functional Mobility 4  Minimal assistance   Additional Comments only able to tolerate taking a few steps around to chair    Additional items Rolling walker   Balance   Static Sitting Fair +   Dynamic Sitting Fair   Static Standing Fair -   Dynamic Standing Poor +   Ambulatory Poor   Activity Tolerance   Activity Tolerance Patient limited by fatigue;Treatment limited secondary to medical complications (Comment)   Medical Staff Made Aware Co-eval performed with PT (Benjamin) 2* medical complexity, comorbidities and limited tolerance to activity - OT focus on self care, cognition/safety and activity tolerance for participation in adls and iadl tasks    RUE Assessment   RUE Assessment WFL   LUE Assessment   LUE Assessment WFL   Cognition   Arousal/Participation Arousable; Cooperative   Attention Attends with cues to redirect   Orientation Level Oriented to person;Oriented to place;Oriented to time;Oriented to situation  (general time )   Memory Decreased short term memory;Decreased recall of recent events;Decreased recall of precautions   Following Commands Follows one step commands with increased time or repetition   Comments limited engagement/interatction - per RN pt continuously removing O2 and Masimo   Assessment   Limitation Decreased ADL status; Decreased Safe judgement during ADL;Decreased endurance;Decreased self-care trans;Decreased high-level ADLs   Prognosis Good;Fair   Assessment Pt is a 78 y o  male who was admitted to [facility] on 1/20/2022 with Sepsis with acute renal failure without septic shock ([facility] [address] ) pt s/p fall OOB 2* LE weakness and was on floor for several hrs 2* inability to get up  Pt's problem list also includes PMH of DM, obesity, previous surgery, COPD, cancer history and cardiac disease, chf, DDD, GERD, HLD, HTN, MI with stents x 3, lung CA, JACQUELINE, prostate CA  At baseline pt was completing adls and mobility independently, admits to at least 1 fall, shares homemaking with son  Pt lives with son in 2 story home with [address] - reports he is home alone during the day while son works  Currently pt requires moderate assist for overall ADLS and min to mod assist for functional mobility/transfers  Pt currently presents with impairments in the following categories -steps to enter environment, limited home support, difficulty performing ADLS, difficulty performing IADLS , limited insight into deficits, compliance, flat affect, decreased initiation and engagement , health management  and environment activity tolerance, endurance, standing balance/tolerance, sitting balance/tolerance, insight, safety  and task initiation    These impairments, as well as pt's fatigue, SOB, WINTERS, decreased caregiver support, risk for falls and home environment  limit pt's ability to safely engage in all baseline areas of occupation, includingbathing, dressing, toileting, functional mobility/transfers, community mobility, laundry , driving, house maintenance, medication management, meal prep, cleaning, social participation  and leisure activities  From OT standpoint, recommend inpt rehab upon D/C  OT will continue to follow to address the below stated goals  Goals   Patient Goals sit and rest    LTG Time Frame 10-14   Long Term Goal #1 refer to established goals below    Plan   Treatment Interventions ADL retraining;Functional transfer training;UE strengthening/ROM; Endurance training;Cognitive reorientation;Patient/family training;Equipment evaluation/education; Compensatory technique education; Energy conservation; Activityengagement   Goal Expiration Date 02/05/22   OT Frequency 3-5x/wk   Recommendation   OT Discharge Recommendation Post acute rehabilitation services   AM-PAC Daily Activity Inpatient   Lower Body Dressing 2   Bathing 2   Toileting 2   Upper Body Dressing 3   Grooming 4   Eating 4   Daily Activity Raw Score 17   Daily Activity Standardized Score (Calc for Raw Score >=11) 37 26   AM-PAC Applied Cognition Inpatient   Following a Speech/Presentation 4   Understanding Ordinary Conversation 4   Taking Medications 3   Remembering Where Things Are Placed or Put Away 3   Remembering List of 4-5 Errands 3   Taking Care of Complicated Tasks 3   Applied Cognition Raw Score 20   Applied Cognition Standardized Score 41 76       OCCUPATIONAL THERAPY GOALS:    *Mod I adls after setup with use of AE PRN  *Mod I toileting and clothing management   *Mod I functional mobility and transfers to/from all surfaces with fair+ dynamic balance and safety for participation in dynamic adls and iadl tasks   *Demonstrate good carryover with safe use of RW, EC, pacing and pursed lip breathing  during functional tasks   *Assess DME needs   *Increase activity tolerance to 35-40 minutes for participation in adls and enjoyable activities  *Pt to participate in ongoing functional cognitive assessment with good attention/participation to assist with safe d/c recommendations    The patient's raw score on the AM-PAC Daily Activity inpatient short form is 17, standardized score is 37 26, less than 39 4  Patients at this level are likely to benefit from discharge to post-acute rehabilitation services  Please refer to the recommendation of the Occupational Therapist for safe discharge planning        [place], [place]

## 2022-01-22 NOTE — PHYSICAL THERAPY NOTE
Physical Therapy Evaluation     Patient's Name: Shira Manuel  Admitting Diagnosis  UTI (urinary tract infection) [N39 0]  Hyperglycemia [R73 9]  Sepsis ([facility] [address] ) [A41 9]    Problem List  Patient Active Problem List   Diagnosis    Obesity (BMI 30-39  9)    Dyslipidemia    Type 2 diabetes mellitus with hyperglycemia, with long-term current use of insulin (HCC)    Venous stasis dermatitis of both lower extremities    COPD, severe (HCC)    Hyponatremia    Sepsis with acute renal failure without septic shock (HCC)    Pleural effusion on right    Chronic diastolic heart failure (HCC)    Essential hypertension    Diabetic neuropathy (HCC)    CAD (coronary artery disease)    RBBB    Oxygen dependent    COPD with acute exacerbation (HCC)    Atypical chest pain    Pulmonary hypertension (HCC)    JACQUELINE (obstructive sleep apnea)    Lung nodule    DDD (degenerative disc disease), lumbar    History of lung cancer    Obesity, morbid ([facility] [address] )    PAD (peripheral artery disease) (HCC)    Chronic respiratory failure with hypoxia (HCC)    Hyperkalemia    Fracture of navicular bone of left foot    Adenocarcinoma of lung (HCC)    Acute kidney injury superimposed on chronic kidney disease (HCC)    Bacteremia       Past Medical History  Past Medical History:   Diagnosis Date    Abdominal pain     Cardiac disease     CHF (congestive heart failure) (HCC)     COPD, severe (HCC)     Coronary artery disease     DDD (degenerative disc disease), lumbar 9/1/2020    Diabetes mellitus (HCC)     Dyspnea     GERD (gastroesophageal reflux disease)     Hyperlipidemia     Hypertension     MI (myocardial infarction) ([facility] [address] )     with 3 stents    Nodule of apex of right lung     JACQUELINE (obstructive sleep apnea)     Prostate cancer [facility])        Past Surgical History  Past Surgical History:   Procedure Laterality Date    ABDOMINAL SURGERY      exploratory    ANGIOPLASTY      3 stents    APPENDECTOMY      COLONOSCOPY  2015    CT NEEDLE BIOPSY LUNG  11/3/2020    ESOPHAGOGASTRODUODENOSCOPY N/A 10/2/2017    Procedure: ESOPHAGOGASTRODUODENOSCOPY (EGD); Surgeon: Senait Rosario MD;  [facility]: [facility]; Service: Gastroenterology    IR THORACENTESIS  11/3/2020    KNEE CARTILAGE SURGERY      OTHER SURGICAL HISTORY      stent placement    PROSTATE SURGERY      SKIN GRAFT      Basal cell CA back          01/22/22 1006   PT Last Visit   PT Visit Date 01/22/22   Note Type   Note type Evaluation   Pain Assessment   Pain Assessment Tool 0-10   Pain Score No Pain   Restrictions/Precautions   Braces or Orthoses   (denies (incl cam boot for (L) foot he wore in the past))   Other Precautions Cognitive; Impulsive;Telemetry;O2;Fall Risk  (chair alarm activated at the end of session)   Home Living   Type of [address] [place] [address] Two level; Able to live on main level with bedroom/bathroom  (1st floor set-up w/ 1 VINI)   Home Equipment Walker;Cane;Wheelchair-electric   Additional Comments reports wearing O2 at home   Prior Function   Level of [place] Independent with ADLs and functional mobility  (amb w/ AD as needed, per pt (in the house); scooter outside)   Lives With Son  (works)   Receives Help From [place] MD   Additional Pertinent History cleared for assessment (spoke to nsg)   Cognition   Overall Cognitive Status Impaired   Arousal/Participation Responsive   Orientation Level Oriented to person;Oriented to situation   Memory Decreased recall of recent events;Decreased recall of precautions   Following Commands Follows one step commands with increased time or repetition   Subjective   Subjective Pt is observed sitting on edge of bed; suppl O2 nc not on; Masimo is also removed; pt w/ flat affect; mod increased latency of verbal and motor responses; agreeable to try mobilization after much encouragement, education and re-assurance provided;    RUE Assessment   RUE Assessment WFL  (AROM)   LUE Assessment   LUE Assessment WFL  (AROM)   RLE Assessment   RLE Assessment WFL  (AROM)   Strength RLE   RLE Overall Strength   (fair + (grossly))   LLE Assessment   LLE Assessment WFL  (AROM)   Strength LLE   LLE Overall Strength   (fair + (grossly))   Transfers   Sit to Stand 4  Minimal assistance  (2 trials)   Additional items Assist x 1; Increased time required;Verbal cues   Stand to Sit 4  Minimal assistance  (2 trials)   Additional items Assist x 1;Verbal cues   Additional Comments After initial standing from edge of bed, pt self initiated return back to edge of bed; education and encouragement provided to progress to transition to the chair   Ambulation/Elevation   Gait pattern Excessively slow; Short stride; Foward flexed; Inconsistent george   Gait Assistance 4  Minimal assist   Additional items Assist x 1;Verbal cues; Tactile cues  (stand by (A) of 2nd for lines)   Assistive Device Rolling walker   Distance 4 ft total distance for bed to chair transition   Balance   Static Sitting Fair +   Dynamic Sitting Fair   Static Standing Fair -   Dynamic Standing Poor +   Ambulatory Poor +   Activity Tolerance   Activity Tolerance Patient limited by fatigue   Medical Staff Made Aware Co-eval performed w/ OTR due to complexity of medical status and multiple comorbidities   Nurse Made Aware spoke to Dimitris Samaniego RN   Assessment   Prognosis Fair   Problem List Decreased strength;Decreased endurance; Impaired balance;Decreased mobility; Decreased cognition;Decreased safety awareness; Obesity   Assessment Pt is 78 y o  male admitted with hx of generalized weakness and Dx of Sepsis with acute renal failure without septic shock   Pt 's comorbidities affecting POC include: Cardiac disease, CHF (congestive heart failure) ([facility] [address] COPD, severe ([facility] [address] ), Coronary artery disease, DDD (degenerative disc disease), lumbar, Diabetes mellitus ([facility] [address] ), Dyspnea, GERD (gastroesophageal reflux disease), Hypertension, MI (myocardial infarction) ([facility] [address] ), and JACQUELINE (obstructive sleep apnea) and personal factors of: VINI and ? level of support available at home  Pt's clinical presentation is currently unstable/unpredictable which is evident in ongoing telem monitoring, abn lab values and inability to progress further w/ mobilization due to pt's overall status, incl cognitive issues  Pt presents w/ generalized weakness, incl decreased LE strength, decreased functional endurance and activity tolerance, impaired balance w/ associated gait deviations ( a few steps at bedside; rw was utilized) and fall risk  Will cont to follow pt in PT for progressive mobilization to address above functional deficits and to max level of (I), endurance, and safety  Currently, recommend rehab upon D/C as pt's overall mobility status appears to be below premorbid level  Will cont to follow until then  Barriers to Discharge Decreased caregiver support   Goals   Patient Goals to sit    STG Expiration Date 02/01/22   Short Term Goal #1 7-10 days  Pt will amb 150 ft w/ rw <--> SPC, mod (I) in order to facilitate safe return to premorbid environment and to initiate return to community amb status  Pt will negotiate 1 step w/ appropriate assistive device, mod (I) in order to assure safe navigation in and out of the premorbid living environment  Pt will achieve (I) level w/ bed mob in order to facilitate safety with OOB and back to bed transitions in own living environment  Pt will perform transfers w/ mod (I) to assure (I) and safety w/ functional mobility/transitions w/ all aspects of mobility/locomotion  Pt will participate in LE therex and balance activities to max progression w/ mobility skills  PT Treatment Day 0   Plan   Treatment/Interventions Functional transfer training;LE strengthening/ROM; Elevations; Therapeutic exercise; Endurance training;Equipment eval/education; Bed mobility;Gait training;Spoke to nursing;OT   PT Frequency 3-5x/wk   Recommendation   PT Discharge Recommendation Post acute rehabilitation services Equipment Recommended [facility] Recommended Wheeled walker   AM-PAC Basic Mobility Inpatient   Turning in Bed Without Bedrails 3   Lying on Back to Sitting on Edge of Flat Bed 3   Moving Bed to Chair 3   Standing Up From Chair 3   Walk in Room 3   Climb 3-5 Stairs 2   Basic Mobility Inpatient Raw Score 17   Basic Mobility Standardized Score 39 67   Highest Level Of Mobility   [facility] Goal 5: Stand one or more mins   -[facility] Highest Level of Mobility 4: Move to chair/commode   -[facility] Goal Achieved Yes   Modified [place] Scale   Modified [place] Scale 4   End of Consult   Patient Position at End of Consult Bedside chair;Bed/Chair alarm activated; All needs within reach         [facility], PT

## 2022-01-22 NOTE — ASSESSMENT & PLAN NOTE
Suspect in setting of hyperglycemia, dehydration   Suspect some element of chronicity   · IVF as above  · Monitor BMP

## 2022-01-22 NOTE — ASSESSMENT & PLAN NOTE
Recent, December 2021  · Seen by ortho, was WBAT LLE CAM boot  · Recommended outpatient f/u 4 weeks which he has not done   Has not been wearing boot  · Spoke with ortho on 1/21 recommended to check xray  · Xray shows healing fracture with stable alignment  · Will recommend continued WBAT

## 2022-01-22 NOTE — PLAN OF CARE
Problem: MOBILITY - ADULT  Goal: Maintain or return to baseline ADL function  Description: INTERVENTIONS:  -  Assess patient's ability to carry out ADLs; assess patient's baseline for ADL function and identify physical deficits which impact ability to perform ADLs (bathing, care of mouth/teeth, toileting, grooming, dressing, etc )  - Assess/evaluate cause of self-care deficits   - Assess range of motion  - Assess patient's mobility; develop plan if impaired  - Assess patient's need for assistive devices and provide as appropriate  - Encourage maximum independence but intervene and supervise when necessary  - Involve family in performance of ADLs  - Assess for home care needs following discharge   - Consider OT consult to assist with ADL evaluation and planning for discharge  - Provide patient education as appropriate  Outcome: Progressing  Goal: Maintains/Returns to pre admission functional level  Description: INTERVENTIONS:  - Perform BMAT or MOVE assessment daily    - Set and communicate daily mobility goal to care team and patient/family/caregiver  - Collaborate with rehabilitation services on mobility goals if consulted  - Perform Range of Motion  times a day  - Reposition patient every  hours    - Dangle patient  times a day  - Stand patient  times a day  - Ambulate patient times a day  - Out of bed to chair  times a day   - Out of bed for meals times a day  - Out of bed for toileting  - Record patient progress and toleration of activity level   Outcome: Progressing     Problem: PAIN - ADULT  Goal: Verbalizes/displays adequate comfort level or baseline comfort level  Description: Interventions:  - Encourage patient to monitor pain and request assistance  - Assess pain using appropriate pain scale  - Administer analgesics based on type and severity of pain and evaluate response  - Implement non-pharmacological measures as appropriate and evaluate response  - Consider cultural and social influences on pain and pain management  - Notify physician/advanced practitioner if interventions unsuccessful or patient reports new pain  Outcome: Progressing     Problem: INFECTION - ADULT  Goal: Absence or prevention of progression during hospitalization  Description: INTERVENTIONS:  - Assess and monitor for signs and symptoms of infection  - Monitor lab/diagnostic results  - Monitor all insertion sites, i e  indwelling lines, tubes, and drains  - Monitor endotracheal if appropriate and nasal secretions for changes in amount and color  - [place] appropriate cooling/warming therapies per order  - Administer medications as ordered  - Instruct and encourage patient and family to use good hand hygiene technique  - Identify and instruct in appropriate isolation precautions for identified infection/condition  Outcome: Progressing  Goal: Absence of fever/infection during neutropenic period  Description: INTERVENTIONS:  - Monitor WBC    Outcome: Progressing     Problem: SAFETY ADULT  Goal: Maintain or return to baseline ADL function  Description: INTERVENTIONS:  -  Assess patient's ability to carry out ADLs; assess patient's baseline for ADL function and identify physical deficits which impact ability to perform ADLs (bathing, care of mouth/teeth, toileting, grooming, dressing, etc )  - Assess/evaluate cause of self-care deficits   - Assess range of motion  - Assess patient's mobility; develop plan if impaired  - Assess patient's need for assistive devices and provide as appropriate  - Encourage maximum independence but intervene and supervise when necessary  - Involve family in performance of ADLs  - Assess for home care needs following discharge   - Consider OT consult to assist with ADL evaluation and planning for discharge  - Provide patient education as appropriate  Outcome: Progressing  Goal: Maintains/Returns to pre admission functional level  Description: INTERVENTIONS:  - Perform BMAT or MOVE assessment daily    - Set and communicate daily mobility goal to care team and patient/family/caregiver  - Collaborate with rehabilitation services on mobility goals if consulted  - Perform Range of Motion times a day  - Reposition patient every  hours    - Dangle patient  times a day  - Stand patient  times a day  - Ambulate patient times a day  - Out of bed to chair  times a day   - Out of bed for meals times a day  - Out of bed for toileting  - Record patient progress and toleration of activity level   Outcome: Progressing  Goal: Patient will remain free of falls  Description: INTERVENTIONS:  - Educate patient/family on patient safety including physical limitations  - Instruct patient to call for assistance with activity   - Consult OT/PT to assist with strengthening/mobility   - Keep Call bell within reach  - Keep bed low and locked with side rails adjusted as appropriate  - Keep care items and personal belongings within reach  - Initiate and maintain comfort rounds  - Make Fall Risk Sign visible to staff  - Offer Toileting every  Hours, in advance of need  - Initiate/Maintain alarm  - Obtain necessary fall risk management equipment:   - Apply yellow socks and bracelet for high fall risk patients  - Consider moving patient to room near nurses station  Outcome: Progressing     Problem: DISCHARGE PLANNING  Goal: Discharge to home or other facility with appropriate resources  Description: INTERVENTIONS:  - Identify barriers to discharge w/patient and caregiver  - Arrange for needed discharge resources and transportation as appropriate  - Identify discharge learning needs (meds, wound care, etc )  - Arrange for interpretive services to assist at discharge as needed  - Refer to Case Management Department for coordinating discharge planning if the patient needs post-hospital services based on physician/advanced practitioner order or complex needs related to functional status, cognitive ability, or social support system  Outcome: Progressing Problem: Knowledge Deficit  Goal: Patient/family/caregiver demonstrates understanding of disease process, treatment plan, medications, and discharge instructions  Description: Complete learning assessment and assess knowledge base    Interventions:  - Provide teaching at level of understanding  - Provide teaching via preferred learning methods  Outcome: Progressing     Problem: Potential for Falls  Goal: Patient will remain free of falls  Description: INTERVENTIONS:  - Educate patient/family on patient safety including physical limitations  - Instruct patient to call for assistance with activity   - Consult OT/PT to assist with strengthening/mobility   - Keep Call bell within reach  - Keep bed low and locked with side rails adjusted as appropriate  - Keep care items and personal belongings within reach  - Initiate and maintain comfort rounds  - Make Fall Risk Sign visible to staff  - Offer Toileting every Hours, in advance of need  - Initiate/Maintain alarm  - Obtain necessary fall risk management equipment  - Apply yellow socks and bracelet for high fall risk patients  - Consider moving patient to room near nurses station  Outcome: Progressing

## 2022-01-22 NOTE — PLAN OF CARE
Problem: OCCUPATIONAL THERAPY ADULT  Goal: Performs self-care activities at highest level of function for planned discharge setting  See evaluation for individualized goals  Description: Treatment Interventions: ADL retraining,Functional transfer training,UE strengthening/ROM,Endurance training,Cognitive reorientation,Patient/family training,Equipment evaluation/education,Compensatory technique education,Energy conservation,Activityengagement          See flowsheet documentation for full assessment, interventions and recommendations  1/22/2022 1324 by Marek Jackson OT  Note: Limitation: Decreased ADL status,Decreased Safe judgement during ADL,Decreased endurance,Decreased self-care trans,Decreased high-level ADLs  Prognosis: Good,Fair  Assessment: Pt is a 78 y o  male who was admitted to [facility] on 1/20/2022 with Sepsis with acute renal failure without septic shock ([facility] [address] pt s/p fall OOB 2* LE weakness and was on floor for several hrs 2* inability to get up  Pt's problem list also includes PMH of DM, obesity, previous surgery, COPD, cancer history and cardiac disease, chf, DDD, GERD, HLD, HTN, MI with stents x 3, lung CA, JACQUELINE, prostate CA  At baseline pt was completing adls and mobility independently, admits to at least 1 fall, shares homemaking with son  Pt lives with son in 2 story home [address] - reports he is home alone during the day while son works  Currently pt requires moderate assist for overall ADLS and min to mod assist for functional mobility/transfers  Pt currently presents with impairments in the following categories -steps to enter environment, limited home support, difficulty performing ADLS, difficulty performing IADLS , limited insight into deficits, compliance, flat affect, decreased initiation and engagement , health management  and environment activity tolerance, endurance, standing balance/tolerance, sitting balance/tolerance, insight, safety  and task initiation    These impairments, as well as pt's fatigue, SOB, WINTERS, decreased caregiver support, risk for falls and home environment  limit pt's ability to safely engage in all baseline areas of occupation, includingbathing, dressing, toileting, functional mobility/transfers, community mobility, laundry , driving, house maintenance, medication management, meal prep, cleaning, social participation  and leisure activities  From OT standpoint, recommend inpt rehab upon D/C  OT will continue to follow to address the below stated goals  OT Discharge Recommendation: Post acute rehabilitation services       1/22/2022 1323 by Daryl Colvin OT  Note: Limitation: Decreased ADL status,Decreased Safe judgement during ADL,Decreased endurance,Decreased self-care trans,Decreased high-level ADLs  Prognosis: Good,Fair  Assessment: Pt is a 78 y o  male who was admitted to [facility] on 1/20/2022 with Sepsis with acute renal failure without septic shock ([facility] [address] ) pt s/p fall OOB 2* LE weakness and was on floor for several hrs 2* inability to get up  Pt's problem list also includes PMH of DM, obesity, previous surgery, COPD, cancer history and cardiac disease, chf, DDD, GERD, HLD, HTN, MI with stents x 3, lung CA, JACQUELINE, prostate CA  At baseline pt was completing adls and mobility independently, admits to at least 1 fall, shares homemaking with son  Pt lives with son in 2 story home [address] - reports he is home alone during the day while son works  Currently pt requires moderate assist for overall ADLS and min to mod assist for functional mobility/transfers   Pt currently presents with impairments in the following categories -steps to enter environment, limited home support, difficulty performing ADLS, difficulty performing IADLS , limited insight into deficits, compliance, flat affect, decreased initiation and engagement , health management  and environment activity tolerance, endurance, standing balance/tolerance, sitting balance/tolerance, insight, safety  and task initiation   These impairments, as well as pt's fatigue, SOB, WINTERS, decreased caregiver support, risk for falls and home environment  limit pt's ability to safely engage in all baseline areas of occupation, includingbathing, dressing, toileting, functional mobility/transfers, community mobility, laundry , driving, house maintenance, medication management, meal prep, cleaning, social participation  and leisure activities  From OT standpoint, recommend inpt rehab upon D/C  OT will continue to follow to address the below stated goals        OT Discharge Recommendation: Post acute rehabilitation services

## 2022-01-22 NOTE — ASSESSMENT & PLAN NOTE
Suspected, tachycardia/tachypnea/leukocytosis on presentation  Patient was complaining of urinary urgency thus suspected source is UTI and UA with pyuria/bacteriuria    Additionally with lactic acidosis resolved s/p IV fluids  · Patient hemodynamically stable and nontoxic appearing at this time  · Received ceftriaxone initially however abx broadened to cefepime on 1/21  · Urine culture pending  · Blood cultures 1/2 showing gram negative rods, other showing gram positive cocci in clusters and gram positive cocci in chains  · Consult ID for input   · Trend WBC, temperature curve, hemodynamics  · Regarding acute renal failure baseline creatinine appears 1 4 and he presented with creat of 2 10  · See further plan below under MARANDA

## 2022-01-22 NOTE — PROGRESS NOTES
[address] [facility]  Progress Note - Adelene Burkitt Sr  1945, 78 y o  male MRN: [id number]  Unit/Bed#: [facility] 603-01 Encounter: [id number]  Primary Care Provider: Mariaelena Pacheco MD   Date and time admitted to hospital: 1/20/2022 11:50 PM    * Sepsis with acute renal failure without septic shock [facility])  Assessment & Plan  Suspected, tachycardia/tachypnea/leukocytosis on presentation  Patient was complaining of urinary urgency thus suspected source is UTI and UA with pyuria/bacteriuria  Additionally with lactic acidosis resolved s/p IV fluids  · Patient hemodynamically stable and nontoxic appearing at this time  · Received ceftriaxone initially however abx broadened to cefepime on 1/21  · Urine culture pending  · Blood cultures 1/2 showing gram negative rods, other showing gram positive cocci in clusters and gram positive cocci in chains  · Consult ID for input   · Trend WBC, temperature curve, hemodynamics  · Regarding acute renal failure baseline creatinine appears 1 4 and he presented with creat of 2 10  · See further plan below under MARANDA    Bacteremia  Assessment & Plan  Blood cultures from admission with gram negative rods and other with gram positive cocci in clusters and chains  · On IV cefepime  · ID consulted  · Follow final cultures    Acute kidney injury superimposed on chronic kidney disease [facility])  Assessment & Plan  Lab Results   Component Value Date    EGFR 21 01/22/2022    EGFR 21 01/21/2022    EGFR 22 01/21/2022    CREATININE 2 76 (H) 01/22/2022    CREATININE 2 76 (H) 01/21/2022    CREATININE 2 70 (H) 01/21/2022   POA creatinine 2 10, baseline around 1 2-1 4  · Etiology suspected to be from sepsis/dehdyration   S/P IVF on admission  · Unfortunately creatinine continues to trend up, consulted renal for input   · Continue to ensure no retention by checking bladder scan, PVRs  · Avoid hypotension  · Holding lasix  · Renal U/S without hydro  · On IVF    Adenocarcinoma of lung [facility])  [address] with radiation oncology as outpatient, most recent office note reviewed from 9/2021  · He had CTA study on July 23, 2021--revealed no evidence of any disease  · Recommended to have repeat CT chest January 2022 and f/u as outpatienet  · Continue to recommend outpatient f/u    COPD, severe [facility])  Assessment & Plan  Not in acute exacerbation  · Continue Trelegy, p r n  Nebulizers    Fracture of navicular bone of left foot  Assessment & Plan  Recent, December 2021  · Seen by ortho, was WBAT LLE CAM boot  · Recommended outpatient f/u 4 weeks which he has not done  Has not been wearing boot  · Spoke with ortho on 1/21 recommended to check xray  · Xray shows healing fracture with stable alignment  · Will recommend continued WBAT    Chronic respiratory failure with hypoxia (HCC)  Assessment & Plan  On 3 L NC continuously at baseline  · Continue supplemental oxygen and titrate as needed to maintain SaO2 88-94%  · CXR with stable findings     Essential hypertension  Assessment & Plan  Continue lopressor however will increase to 50 mg BID given ongoing tachycardia  · Hold lasix given MARANDA    Chronic diastolic heart failure (HCC)  Assessment & Plan  Wt Readings from Last 3 Encounters:   01/21/22 104 kg (229 lb)   12/16/21 104 kg (229 lb 11 5 oz)   07/25/21 111 kg (244 lb 12 8 oz)     Recent admission with acute on chronic heart failure  Appears fairly euvolemic at this time despite imaging showing loculated pleural effusion on admission  · Continue to hold lasix for now, on IVF per renal given MARANDA  · Monitor daily weights, I/O, volume status  · Low-sodium, fluid-restricted diet; patient admitted to some recent dietary indiscretion thus counseling provided        Hyponatremia  Assessment & Plan  Suspect in setting of hyperglycemia, dehydration   Suspect some element of chronicity   · IVF as above  · Monitor BMP    Type 2 diabetes mellitus with hyperglycemia, with long-term current use of insulin [facility])  Assessment & Plan  Lab Results   Component Value Date    HGBA1C 9 2 (H) 2021       Recent Labs     22  1113 22  1703 22  2121 22  0032   POCGLU 282* 263* 337* 317*       Blood Sugar Average: Last 72 hrs:  · (P) 308 6   · A1C poorly controlled, with hyperglycemia on admission  · Home regimen: U500 45 units before breakfast and lunch  · Was switched to basal/bolus on admission, continue to titrate PRN  · SSI with Accu-Cheks, carb controlled diet  · Initiate hypoglycemia protocol        VTE Pharmacologic Prophylaxis: VTE Score: 5 Moderate Risk (Score 3-4) - Pharmacological DVT Prophylaxis Ordered: heparin  Patient Centered Rounds: I performed bedside rounds with nursing staff today  Discussions with Specialists or Other Care Team Provider: appreciate ID and renal input    Education and Discussions with Family / Patient: Attempted to update  (son) via phone  Left voicemail  Time Spent for Care: 30 minutes  More than 50% of total time spent on counseling and coordination of care as described above  Current Length of Stay: 1 day(s)  Current Patient Status: Inpatient   Certification Statement: The patient will continue to require additional inpatient hospital stay due to sepsis, bacteremia, MARANDA   Discharge Plan: Anticipate discharge in >72 hrs to discharge location to be determined pending rehab evaluations  Code Status: Level 1 - Full Code    Subjective:   Doing okay  Feels a bit better today  More awake  Feels like he cannot pee  Breathing stable  Objective:     Vitals:   Temp (24hrs), Av 5 °F (37 5 °C), Min:98 7 °F (37 1 °C), Max:100 3 °F (37 9 °C)    Temp:  [98 7 °F (37 1 °C)-100 3 °F (37 9 °C)] 98 7 °F (37 1 °C)  HR:  [] 111  Resp:  [18-31] 18  BP: (118-159)/(56-88) 159/88  SpO2:  [91 %-98 %] 91 %  Body mass index is 31 06 kg/m²  Input and Output Summary (last 24 hours):      Intake/Output Summary (Last 24 hours) at 2022 [address] [facility] [address] filed at 1/21/2022 2029  Gross per 24 hour   Intake 770 ml   Output 250 ml   Net 520 ml       Physical Exam:   Physical Exam  Vitals and nursing note reviewed  Constitutional:       General: He is not in acute distress  Appearance: He is obese  Comments: On oxygen, disheveled appearance    Cardiovascular:      Rate and Rhythm: Regular rhythm  Tachycardia present  Pulmonary:      Breath sounds: No wheezing or rales  Abdominal:      General: There is no distension  Tenderness: There is no abdominal tenderness  Musculoskeletal:      Right lower leg: No edema  Left lower leg: No edema  Neurological:      Mental Status: He is oriented to person, place, and time  Additional Data:     Labs:  Results from last 7 days   Lab Units 01/22/22 0457 01/21/22  1117 01/21/22  1117 01/21/22  0018 01/21/22  0018   WBC Thousand/uL 19 98*   < > 20 27*   < > 23 62*   HEMOGLOBIN g/dL 11 3*   < > 11 5*   < > 12 2   HEMATOCRIT % 35 1*   < > 35 6*   < > 37 7   PLATELETS Thousands/uL 180   < > 178   < > 155   BANDS PCT %  --   --   --   --  17*   NEUTROS PCT %  --   --  88*  --   --    LYMPHS PCT %  --   --  3*  --   --    LYMPHO PCT %  --   --   --   --  2*   MONOS PCT %  --   --  8  --   --    MONO PCT %  --   --   --   --  8   EOS PCT %  --   --  0  --  0    < > = values in this interval not displayed  Results from last 7 days   Lab Units 01/22/22 0457 01/21/22  1117 01/21/22  0018   SODIUM mmol/L 129*   < > 130*   POTASSIUM mmol/L 3 7   < > 3 8   CHLORIDE mmol/L 96*   < > 93*   CO2 mmol/L 23   < > 27   BUN mg/dL 55*   < > 32*   CREATININE mg/dL 2 76*   < > 2 10*   ANION GAP mmol/L 10   < > 10   CALCIUM mg/dL 8 2*   < > 9 7   ALBUMIN g/dL  --   --  2 5*   TOTAL BILIRUBIN mg/dL  --   --  1 24*   ALK PHOS U/L  --   --  139*   ALT U/L  --   --  13   AST U/L  --   --  24   GLUCOSE RANDOM mg/dL 310*   < > 371*    < > = values in this interval not displayed           Results from last 7 days   Lab Units 01/22/22  0032 01/21/22  2121 01/21/22  1703 01/21/22  1113 01/21/22  0641   POC GLUCOSE mg/dl 317* 337* 263* 282* 344*         Results from last 7 days   Lab Units 01/21/22  0444 01/21/22  0126   LACTIC ACID mmol/L 1 7 2 3*   PROCALCITONIN ng/ml  --  11 61*       Lines/Drains:  Invasive Devices  Report    Peripheral Intravenous Line            Peripheral IV 12/15/21 Dorsal (posterior); Right Forearm 38 days    Peripheral IV 01/22/22 Distal;Right;Ventral (anterior) Forearm <1 day                      Imaging: Reviewed radiology reports from this admission including: renal US    Recent Cultures (last 7 days):   Results from last 7 days   Lab Units 01/21/22  0226   GRAM STAIN RESULT  Gram positive cocci in clusters*  Gram positive cocci in chains*  Gram negative rods*       Last 24 Hours Medication List:   Current Facility-Administered Medications   Medication Dose Route Frequency Provider Last Rate    cefepime  2,000 mg Intravenous Q24H Keren Gold MD Stopped (01/21/22 2029)    cyclobenzaprine  10 mg Oral TID Marycruz Davis DO      heparin (porcine)  5,000 Units Subcutaneous Q8H [address] Marycruz Davis DO      insulin glargine  70 Units Subcutaneous HS Jenny Priest PA-C      insulin lispro  1-6 Units Subcutaneous HS Marycruz Davis DO      insulin lispro  2-12 Units Subcutaneous TID AC Marycruz Davis DO      insulin lispro  20 Units Subcutaneous TID With Meals Gia Clements PA-C      metoprolol tartrate  50 mg Oral BID Gia Clements PA-C      oxyCODONE-acetaminophen  1 5 tablet Oral Q4H PRN Marycruz Davis DO      sodium chloride  100 mL/hr Intravenous Continuous Gia Clements PA-C 100 mL/hr (01/21/22 2316)        Today, Patient Was Seen By: Gia Clements PA-C    **Please Note: This note may have been constructed using a voice recognition system  **

## 2022-01-22 NOTE — ASSESSMENT & PLAN NOTE
Wt Readings from Last 3 Encounters:   01/21/22 104 kg (229 lb)   12/16/21 104 kg (229 lb 11 5 oz)   07/25/21 111 kg (244 lb 12 8 oz)     Recent admission with acute on chronic heart failure  Appears fairly euvolemic at this time despite imaging showing loculated pleural effusion on admission     · Continue to hold lasix for now, on IVF per renal given MARANDA  · Monitor daily weights, I/O, volume status  · Low-sodium, fluid-restricted diet; patient admitted to some recent dietary indiscretion thus counseling provided

## 2022-01-22 NOTE — QUICK NOTE
QUICK NOTE - Deterioration Index  Ziyad Peter  78 y o  male MRN: [id number]  Unit/Bed#: [facility] 603-01 Encounter: [id number]    Date Paged: 22  Time Paged: [address]  Room #: HTKQ 865  Arrival Time: 1611  Deterioration index score at time of page: 52 6  %  Spoke with Alba Contreras RN from primary team  Need to escalate level of care: no     PROBLEMS resulting in high DI score:   23% Age 78   21% Supplemental oxygen Nasal cannula   18% Systolic 87   21% Respiratory rate 21     PLAN:     DI score resolved at time of evaluation, unclear source of trigger   Suspect 2/2 hypotension, however patient /54 on arrival   BS very high, recommend insulin infusion for tighter control   No need for escalation of care    Please contact critical care via [employer] with any questions or concerns       Vitals:   Vitals:    22 0830 22 0830 22 0852 22 1428   BP: 121/77 121/77  (!) 87/62   Pulse:       Resp:  20  21   Temp: (!) 97 3 °F (36 3 °C) (!) 97 3 °F (36 3 °C)     TempSrc:       SpO2:   91%    Weight:       Height:           Respiratory:  SpO2: SpO2: 91 %, SpO2 Activity: SpO2 Activity: At Rest, SpO2 Device: O2 Device: Nasal cannula  Nasal Cannula O2 Flow Rate (L/min): 3 L/min    Temperature: Temp (24hrs), Av 8 °F (36 6 °C), Min:97 3 °F (36 3 °C), Max:98 7 °F (37 1 °C)  Current: Temperature: (!) 97 3 °F (36 3 °C)    Labs:   Results from last 7 days   Lab Units 22  0457 22  1117 22  0018   WBC Thousand/uL 19 98* 20 27* 23 62*   HEMOGLOBIN g/dL 11 3* 11 5* 12 2   HEMATOCRIT % 35 1* 35 6* 37 7   PLATELETS Thousands/uL 180 178 155   NEUTROS PCT %  --  88*  --    MONOS PCT %  --  8  --    MONO PCT %  --   --  8     Results from last 7 days   Lab Units 22  0457 22  1908 22  1117 22  0018 22  0018   SODIUM mmol/L 129* 126* 131*   < > 130*   POTASSIUM mmol/L 3 7 3 9 4 0   < > 3 8   CHLORIDE mmol/L 96* 95* 94*   < > 93*   CO2 mmol/L 23 23 28   < > 27   BUN mg/dL 55* 51* 43*   < > 32*   CREATININE mg/dL 2 76* 2 76* 2 70*   < > 2 10*   CALCIUM mg/dL 8 2* 8 7 8 9   < > 9 7   ALK PHOS U/L  --   --   --   --  139*   ALT U/L  --   --   --   --  13   AST U/L  --   --   --   --  24    < > = values in this interval not displayed           Results from last 7 days   Lab Units 01/21/22  0444 01/21/22  0126   LACTIC ACID mmol/L 1 7 2 3*         Results from last 7 days   Lab Units 01/22/22  1023 01/21/22  0126   PROCALCITONIN ng/ml 17 47* 11 61*       Code Status: Level 1 - Full Code

## 2022-01-22 NOTE — ASSESSMENT & PLAN NOTE
Lab Results   Component Value Date    HGBA1C 9 2 (H) 04/14/2021       Recent Labs     01/21/22  1113 01/21/22  1703 01/21/22  2121 01/22/22  0032   POCGLU 282* 263* 337* 317*       Blood Sugar Average: Last 72 hrs:  · (P) 308 6   · A1C poorly controlled, with hyperglycemia on admission  · Home regimen: U500 45 units before breakfast and lunch  · Was switched to basal/bolus on admission, continue to titrate PRN  · SSI with Accu-Cheks, carb controlled diet  · Initiate hypoglycemia protocol

## 2022-01-22 NOTE — ASSESSMENT & PLAN NOTE
Blood cultures from admission with gram negative rods and other with gram positive cocci in clusters and chains  · On IV cefepime  · ID consulted  · Follow final cultures

## 2022-01-22 NOTE — PROGRESS NOTES
NEPHROLOGY PROGRESS NOTE   Lauryn Fuller  78 y o  male MRN: [id number]  Unit/Bed#: [facility] 603-01 Encounter: [id number]  Reason for Consult: MARANDA    ASSESSMENT AND PLAN:  78 y o  man PMH of CKD G 3a (bl creatinine 1 4-1 6 mg/dL),  obesity, hypertension, DM, CHF, COPD on oxygen at home 2-3 L, CAD,  P/w weakness an increase urinary frequency, was found to have a UTI  Nephrology is consulted for MARANDA    MARANDA on CKD stage 3, baseline creatinine 1 4 to 1 6  -MARANDA suspect in the setting of prerenal, urosepsis, ATN, Klebsiella bacteremia  -creatinine seems to have plateaued 2 7  -UA shows concentrated sample, 2+ proteinuria, innumerable WBCs/bacteria, no RBCs, 3 to 5 granular casts suggestive of ATN  -renal ultrasound shows normal size kidneys, normal echogenicity, no left-sided hydronephrosis  Right renal pelvis prominence without any caliceal reticulation unchanged from prior study   -continue to closely monitor intake and output  -continue current IV normal saline 100 mL/hour    Hyponatremia  -corrected serum sodium around 132, sodium level slowly improving  -continue to monitor with ongoing current IV fluid  BMP in a m   -urine sodium, urine osmolality results to follow  CKD suspect in the setting of underlying diabetes    Klebsiella bacteremia  UTI, antibiotic as per primary team  -urine culture shows Klebsiella    Discussed above plan with primary team    SUBJECTIVE:  Patient seen and examined at bedside   No chest pain, shortness of breath, nausea, vomiting    OBJECTIVE:  Current Weight: Weight - Scale: 104 kg (229 lb)  Vitals:    01/22/22 0852   BP:    Pulse:    Resp:    Temp:    SpO2: 91%       Intake/Output Summary (Last 24 hours) at 1/22/2022 1233  Last data filed at 1/22/2022 1001  Gross per 24 hour   Intake 770 ml   Output 600 ml   Net 170 ml     Wt Readings from Last 3 Encounters:   01/21/22 104 kg (229 lb)   12/16/21 104 kg (229 lb 11 5 oz)   07/25/21 111 kg (244 lb 12 8 oz)     Temp Readings from Last 3 Encounters:   01/22/22 (!) 97 3 °F (36 3 °C)   12/15/21 97 7 °F (36 5 °C)   09/14/21 97 7 °F (36 5 °C) (Temporal)     BP Readings from Last 3 Encounters:   01/22/22 121/77   12/16/21 133/66   09/14/21 118/68     Pulse Readings from Last 3 Encounters:   01/21/22 (!) 111   12/16/21 80   09/14/21 90        Physical Examination:  General:  Sitting in chair, no acute distress   Eyes:  Mild conjunctival pallor present  ENT:  External examination of ears and nose unremarkable  Neck:  No obvious lymphadenopathy appreciated  Respiratory:  Bilateral air entry present  CVS:  S1, S2 present  GI: , nondistended  CNS:  Active, alert  Skin:  No new rash in legs  Musculoskeletal:  No obvious new gross deformity noted    Medications:    Current Facility-Administered Medications:     cefepime (MAXIPIME) 2,000 mg in dextrose 5 % 50 mL IVPB, 2,000 mg, Intravenous, Q24H, Liz García MD, Stopped at 01/21/22 2029    cyclobenzaprine (FLEXERIL) tablet 10 mg, 10 mg, Oral, TID, Bertis Spar, DO, 10 mg at 01/22/22 9674    heparin (porcine) subcutaneous injection 5,000 Units, 5,000 Units, Subcutaneous, Q8H [address], Bertis Spar, DO, 5,000 Units at 01/22/22 0534    insulin glargine (LANTUS) subcutaneous injection 70 Units 0 7 mL, 70 Units, Subcutaneous, HS, Jenny Priest PA-C    insulin lispro (HumaLOG) 100 units/mL subcutaneous injection 1-6 Units, 1-6 Units, Subcutaneous, HS, Bertis Spar, DO, 5 Units at 01/22/22 0032    insulin lispro (HumaLOG) 100 units/mL subcutaneous injection 2-12 Units, 2-12 Units, Subcutaneous, TID AC, 12 Units at 01/22/22 1231 **AND** Fingerstick Glucose (POCT), , , TID AC, Bertis Spar, DO    insulin lispro (HumaLOG) 100 units/mL subcutaneous injection 20 Units, 20 Units, Subcutaneous, TID With Meals, Alex Batista PA-C, 20 Units at 01/22/22 1231    metoprolol tartrate (LOPRESSOR) tablet 50 mg, 50 mg, Oral, BID, Jenny Priest PA-C, 50 mg at 01/22/22 0826    oxyCODONE-acetaminophen (PERCOCET) 5-325 mg per tablet 1 5 tablet, 1 5 tablet, Oral, Q4H PRN, Cathryn Dawkins DO    sodium chloride 0 9 % infusion, 100 mL/hr, Intravenous, Continuous, Saqib Enriquez PA-C, Last Rate: 100 mL/hr at 01/21/22 2316, 100 mL/hr at 01/21/22 2316    Laboratory Results:  Results from last 7 days   Lab Units 01/22/22  0457 01/21/22  1908 01/21/22  1117 01/21/22  0018   WBC Thousand/uL 19 98*  --  20 27* 23 62*   HEMOGLOBIN g/dL 11 3*  --  11 5* 12 2   HEMATOCRIT % 35 1*  --  35 6* 37 7   PLATELETS Thousands/uL 180  --  178 155   SODIUM mmol/L 129* 126* 131* 130*   POTASSIUM mmol/L 3 7 3 9 4 0 3 8   CHLORIDE mmol/L 96* 95* 94* 93*   CO2 mmol/L 23 23 28 27   BUN mg/dL 55* 51* 43* 32*   CREATININE mg/dL 2 76* 2 76* 2 70* 2 10*   CALCIUM mg/dL 8 2* 8 7 8 9 9 7       US kidney and bladder   Final Result by Natacha Mary MD (01/21 2346)      No hydronephrosis  Prominence of the right renal pelvis without any calyceal reticulation, unchanged from the previous study       Workstation performed: [id number]         XR ankle 3+ vw left   Final Result by Evy Mike MD (01/22 0477)      Healing avulsion fracture of the navicular with stable alignment  Workstation performed: [id number]         CT head without contrast   Final Result by Junior Amato DO (01/21 0127)      Anterior subluxation of the left of the temporomandibular joint is questioned; no calvarial fracture or acute intracranial abnormality is seen otherwise  Other findings as above  Workstation performed: [id number]         XR chest portable   Final Result by Trinidad Couch MD (01/21 0802)      Stable right lung volume loss with known right lung base round atelectasis and loculated pleural effusion  Workstation performed: [id number]             Portions of the record may have been created with voice recognition software   Occasional wrong word or "sound a like" substitutions may have occurred due to the inherent limitations of voice recognition software  Read the chart carefully and recognize, using context, where substitutions have occurred

## 2022-01-22 NOTE — UTILIZATION REVIEW
Initial Clinical Review    Admission: Date/Time/Statement:   Admission Orders (From admission, onward)     Ordered        01/21/22 0316  Inpatient Admission  Once                      Orders Placed This Encounter   Procedures    Inpatient Admission     Standing Status:   Standing     Number of Occurrences:   1     Order Specific Question:   Level of Care     Answer:   Med Surg [16]     Order Specific Question:   Estimated length of stay     Answer:   More than 2 Midnights     Order Specific Question:   Certification     Answer:   I certify that inpatient services are medically necessary for this patient for a duration of greater than two midnights  See H&P and MD Progress Notes for additional information about the patient's course of treatment  ED Arrival Information     Expected Arrival Acuity    - 1/20/2022 23:50 Urgent         Means of arrival Escorted by Service Admission type    Ambulance [facility] Urgent         Arrival complaint    Hyperglycemia        Chief Complaint   Patient presents with    Hyperglycemia - Symptomatic     pt reports generalized weakness that made him slide to the floor and not get up for two hours, pt has uncontrolled DM with blood glucose 423 during transport, pt reports SOB, pt has history COPD       Initial Presentation: 78year old male to the ED from home with complaints of elevated blood sugar, weakness for the past week, legs gave out today  Admitted to inpatient for sepsis with renal failure, CHF  H/O COPD with 3LNC baseline  He was getting out of bed and legs gave out, unable to get off the floor  Glucose was in 400s  INcrease in shortness of breath lately too  Arrives with tachycardia, decreased breath sound, tachypneic, bilateral lower extremity edema  WBCs elevated  Complains of urinary urgency, suspecting UTI with urine showing bacteria, pyuria   Lactic acidosis resolved with IV fluids  Blood and urine cultures pending    IV abx started in the ED  Started on IV fluids  Recheck Creat  Measure PVR, bladder scan  Trend WBCs  Nephrology consult  1/21 Nephrology consult:  MARANDA on CKD likely prerenal is setting of urosepsis, ATN  Creat trending up  Check Renal US  Continue with IV fluids  1/21 Update:  Blood cultures growing Gram neg rods  Continue with IV abx  Date:  1/22   Day 2:  Trend WBCs  Urine culture pending  ID consult for + BC  Creat continues to increase  IV fluids, hold lasix  Nephrology consult  US not showing hydro  Trend BMP  Tachycardic  Nephrology consult: MARANDA on CKD  Suspect prerenal, urosepsis, atn, bacteremia  COntinue with IV fluids  Hyponatremia slowly improving  REpeat BMP  Continue with IV abx     ED Triage Vitals   Temperature Pulse Respirations Blood Pressure SpO2   01/20/22 2354 01/20/22 2354 01/20/22 2354 01/20/22 2354 01/20/22 2354   97 5 °F (36 4 °C) (!) 114 22 117/62 98 %      Temp Source Heart Rate Source Patient Position - Orthostatic VS BP Location FiO2 (%)   01/20/22 2354 01/20/22 2354 01/20/22 2354 01/20/22 2354 --   Oral Monitor Lying Left arm       Pain Score       01/21/22 1240       No Pain          Wt Readings from Last 1 Encounters:   01/21/22 104 kg (229 lb)     Additional Vital Signs:   Time Temp Pulse Resp BP MAP (mmHg) SpO2 Calculated FIO2 (%) - Nasal Cannula Nasal Cannula O2 Flow Rate (L/min) O2 Device Patient Position - Orthostatic VS   01/22/22 08:30:19 97 3 °F (36 3 °C) Abnormal  -- 20 121/77 92 -- -- -- -- --   01/22/22 0830 97 3 °F (36 3 °C) Abnormal  -- -- 121/77 92 -- -- -- -- --   01/21/22 23:26:46 98 7 °F (37 1 °C) 111 Abnormal  18 159/88 -- 91 % 32 3 L/min Nasal cannula --   01/21/22 1930 -- -- -- -- -- 92 % 32 3 L/min Nasal cannula --   01/21/22 1530 -- -- 24 Abnormal  -- -- -- -- -- -- --   01/21/22 15:24:02 99 6 °F (37 6 °C) 91 31 Abnormal  123/77 92 96 % -- -- -- --   01/21/22 12:40:08 100 3 °F (37 9 °C) 108 Abnormal  20 135/83 -- 98 % -- -- -- --   01/21/22 0900 -- 102 24 Abnormal  118/56 80 97 % -- -- -- --   01/21/22 0700 -- 85 18 137/67 95 98 % -- -- -- --   01/21/22 0443 -- 98 18 125/58 -- 98 % 32 3 L/min Nasal cannula --   01/21/22 0230 -- 101 20 117/66 86 96 % 32 3 L/min Nasal cannula --   01/20/22 2354 97 5 °F (36 4 °C) 114 Abnormal  22 117/62 -- 98 % 36 4 L/min Nasal cannula Lying       Pertinent Labs/Diagnostic Test Results:   1/21 US KUB: No hydronephrosis   Prominence of the right renal pelvis without any calyceal reticulation, unchanged from the previous study   1/22 Xray left ankle: Healing avulsion fracture of the navicular with stable alignment  1/21 CT head:   Anterior subluxation of the left of the temporomandibular joint is questioned; no calvarial fracture or acute intracranial abnormality is seen otherwise      1/21 CXR: Stable right lung volume loss with known right lung base round atelectasis and loculated pleural effusion       Results from last 7 days   Lab Units 01/21/22  0018   SARS-COV-2  Negative     Results from last 7 days   Lab Units 01/22/22  0457 01/21/22  1117 01/21/22  0018   WBC Thousand/uL 19 98* 20 27* 23 62*   HEMOGLOBIN g/dL 11 3* 11 5* 12 2   HEMATOCRIT % 35 1* 35 6* 37 7   PLATELETS Thousands/uL 180 178 155   NEUTROS ABS Thousands/µL  --  17 86*  --    BANDS PCT %  --   --  17*         Results from last 7 days   Lab Units 01/22/22  0457 01/21/22  1908 01/21/22  1117 01/21/22  0018   SODIUM mmol/L 129* 126* 131* 130*   POTASSIUM mmol/L 3 7 3 9 4 0 3 8   CHLORIDE mmol/L 96* 95* 94* 93*   CO2 mmol/L 23 23 28 27   ANION GAP mmol/L 10 8 9 10   BUN mg/dL 55* 51* 43* 32*   CREATININE mg/dL 2 76* 2 76* 2 70* 2 10*   EGFR ml/min/1 73sq m 21 21 22 30   CALCIUM mg/dL 8 2* 8 7 8 9 9 7     Results from last 7 days   Lab Units 01/21/22  0018   AST U/L 24   ALT U/L 13   ALK PHOS U/L 139*   TOTAL PROTEIN g/dL 7 7   ALBUMIN g/dL 2 5*   TOTAL BILIRUBIN mg/dL 1 24*     Results from last 7 days   Lab Units 01/22/22  0826 01/22/22  0032 01/21/22  2121 01/21/22  1703 01/21/22  1113 01/21/22  0641   POC GLUCOSE mg/dl 354* 317* 337* 263* 282* 344*     Results from last 7 days   Lab Units 01/22/22  0457 01/21/22  1908 01/21/22  1117 01/21/22  0018   GLUCOSE RANDOM mg/dL 310* 307* 228* 371*             BETA-HYDROXYBUTYRATE   Date Value Ref Range Status   06/15/2019 0 2 <0 6 mmol/L Final          Results from last 7 days   Lab Units 01/21/22  0018   PH MAYE  7 364   PCO2 MAYE mm Hg 43 7   PO2 MAYE mm Hg 100 1*   HCO3 MAYE mmol/L 24 4   BASE EXC MAYE mmol/L -1 1   O2 CONTENT MAYE ml/dL 16 5   O2 HGB, VENOUS % 94 8*         Results from last 7 days   Lab Units 01/21/22  0021   CK TOTAL U/L 578*   CK MB INDEX % 1 2   CK MB ng/mL 6 7*     Results from last 7 days   Lab Units 01/21/22  0444 01/21/22  0227 01/21/22  0018   HS TNI 0HR ng/L  --   --  51   HS TNI 2HR ng/L  --  60  --    HSTNI D2 ng/L  --  9  --    HS TNI 4HR ng/L 61  --   --    HSTNI D4 ng/L 10  --   --                  Results from last 7 days   Lab Units 01/21/22  0126   PROCALCITONIN ng/ml 11 61*     Results from last 7 days   Lab Units 01/21/22  0444 01/21/22  0126   LACTIC ACID mmol/L 1 7 2 3*         Results from last 7 days   Lab Units 01/21/22  0227   CLARITY UA  Turbid   COLOR UA  Sangeetha   SPEC GRAV UA  1 025   PH UA  5 5   GLUCOSE UA mg/dl >=1000 (1%)*   KETONES UA mg/dl Trace*   BLOOD UA  Large   PROTEIN UA mg/dl 100 (2+)*   NITRITE UA  Negative   BILIRUBIN UA  Negative   UROBILINOGEN UA E U /dl 1 0   LEUKOCYTES UA  Moderate*   WBC UA /hpf Innumerable*   RBC UA /hpf None Seen   BACTERIA UA /hpf Innumerable*   EPITHELIAL CELLS WET PREP /hpf None Seen     Results from last 7 days   Lab Units 01/21/22  0018   INFLUENZA A PCR  Negative   INFLUENZA B PCR  Negative   RSV PCR  Negative       Results from last 7 days   Lab Units 01/21/22  0227 01/21/22 0226   BLOOD CULTURE   --  Klebsiella-Enterobacter  group*   GRAM STAIN RESULT   --  Gram negative rods*  Gram positive cocci in clusters*  Gram positive cocci in chains*   URINE CULTURE  >100,000 cfu/ml Gram Negative Jonny Enteric Like*  --        ED Treatment:   Medication Administration from 01/20/2022 2350 to 01/21/2022 1231       Date/Time Order Dose Route Action     01/21/2022 0228 ceftriaxone (ROCEPHIN) 1 g/50 mL in dextrose IVPB 1,000 mg Intravenous New Bag     01/21/2022 0229 multi-electrolyte (ISOLYTE-S PH 7 4) bolus 500 mL 500 mL Intravenous New Bag     01/21/2022 0814 cyclobenzaprine (FLEXERIL) tablet 10 mg 10 mg Oral Given     01/21/2022 0814 furosemide (LASIX) tablet 40 mg 40 mg Oral Given     01/21/2022 0814 metoprolol tartrate (LOPRESSOR) tablet 25 mg 25 mg Oral Given     01/21/2022 1113 insulin lispro (HumaLOG) 100 units/mL subcutaneous injection 2-12 Units 6 Units Subcutaneous Given     01/21/2022 0706 insulin lispro (HumaLOG) 100 units/mL subcutaneous injection 2-12 Units 8 Units Subcutaneous Given     01/21/2022 0705 heparin (porcine) subcutaneous injection 5,000 Units 5,000 Units Subcutaneous Given     01/21/2022 1114 insulin lispro (HumaLOG) 100 units/mL subcutaneous injection 15 Units 15 Units Subcutaneous Given     01/21/2022 0707 insulin lispro (HumaLOG) 100 units/mL subcutaneous injection 15 Units 15 Units Subcutaneous Given        Past Medical History:   Diagnosis Date    Abdominal pain     Cardiac disease     [facility]     [facility]     Coronary artery disease     DDD (degenerative disc disease), lumbar 9/1/2020    Diabetes mellitus ([facility]     Dyspnea     GERD (gastroesophageal reflux disease)     Hyperlipidemia     Hypertension     [facility]     with 3 stents    Nodule of apex of right lung     JACQUELINE (obstructive sleep apnea)     Prostate cancer ([facility])        Admitting Diagnosis: UTI (urinary tract infection) [N39 0]  Hyperglycemia [R73 9]  Sepsis ([facility] ) [A41 9]  Age/Sex: 78 y o  male  Admission Orders:  SCDs  Up and oobs  BMP, CBC< Urine osmol, sodium  Scheduled Medications:  cefepime, 2,000 mg, Intravenous, Q24H  cyclobenzaprine, 10 mg, Oral, TID  heparin (porcine), 5,000 Units, Subcutaneous, Q8H DRAKE  insulin glargine, 70 Units, Subcutaneous, HS  insulin lispro, 1-6 Units, Subcutaneous, HS  insulin lispro, 2-12 Units, Subcutaneous, TID AC  insulin lispro, 20 Units, Subcutaneous, TID With Meals  metoprolol tartrate, 50 mg, Oral, BID      Continuous IV Infusions:  sodium chloride, 100 mL/hr, Intravenous, Continuous      PRN Meds:  oxyCODONE-acetaminophen, 1 5 tablet, Oral, Q4H PRN        IP CONSULT TO CASE MANAGEMENT  IP CONSULT TO NEPHROLOGY  IP CONSULT TO INFECTIOUS DISEASES    Network Utilization Review Department  ATTENTION: Please call with any questions or concerns to [phone] and carefully listen to the prompts so that you are directed to the right person  All voicemails are confidential   Candia Siemens all requests for admission clinical reviews, approved or denied determinations and any other requests to dedicated fax number below belonging to the campus where the patient is receiving treatment   List of dedicated fax numbers for the Facilities:  [address] DENIALS (Administrative/Medical Necessity) [phone]   [address] (Maternity/NICU/Pediatrics) [phone]   [address]  [address] [facility] [place] [address] [address] [place] [address] [address] [facility] [address] [phone]

## 2022-01-23 LAB
ANION GAP SERPL CALCULATED.3IONS-SCNC: 8 MMOL/L (ref 4–13)
BACTERIA BLD CULT: ABNORMAL
BACTERIA UR CULT: ABNORMAL
BASOPHILS # BLD AUTO: 0.03 THOUSANDS/ΜL (ref 0–0.1)
BASOPHILS NFR BLD AUTO: 0 % (ref 0–1)
BUN SERPL-MCNC: 66 MG/DL (ref 5–25)
CALCIUM SERPL-MCNC: 8.4 MG/DL (ref 8.3–10.1)
CHLORIDE SERPL-SCNC: 102 MMOL/L (ref 100–108)
CO2 SERPL-SCNC: 24 MMOL/L (ref 21–32)
CREAT SERPL-MCNC: 2.68 MG/DL (ref 0.6–1.3)
EOSINOPHIL # BLD AUTO: 0.08 THOUSAND/ΜL (ref 0–0.61)
EOSINOPHIL NFR BLD AUTO: 1 % (ref 0–6)
ERYTHROCYTE [DISTWIDTH] IN BLOOD BY AUTOMATED COUNT: 15.7 % (ref 11.6–15.1)
GFR SERPL CREATININE-BSD FRML MDRD: 22 ML/MIN/1.73SQ M
GLUCOSE SERPL-MCNC: 170 MG/DL (ref 65–140)
GLUCOSE SERPL-MCNC: 179 MG/DL (ref 65–140)
GLUCOSE SERPL-MCNC: 241 MG/DL (ref 65–140)
GLUCOSE SERPL-MCNC: 73 MG/DL (ref 65–140)
GLUCOSE SERPL-MCNC: 75 MG/DL (ref 65–140)
GRAM STN SPEC: ABNORMAL
HCT VFR BLD AUTO: 33.1 % (ref 36.5–49.3)
HGB BLD-MCNC: 10.4 G/DL (ref 12–17)
IMM GRANULOCYTES # BLD AUTO: 0.15 THOUSAND/UL (ref 0–0.2)
IMM GRANULOCYTES NFR BLD AUTO: 1 % (ref 0–2)
LYMPHOCYTES # BLD AUTO: 0.74 THOUSANDS/ΜL (ref 0.6–4.47)
LYMPHOCYTES NFR BLD AUTO: 4 % (ref 14–44)
MCH RBC QN AUTO: 25.6 PG (ref 26.8–34.3)
MCHC RBC AUTO-ENTMCNC: 31.4 G/DL (ref 31.4–37.4)
MCV RBC AUTO: 82 FL (ref 82–98)
MONOCYTES # BLD AUTO: 1.51 THOUSAND/ΜL (ref 0.17–1.22)
MONOCYTES NFR BLD AUTO: 9 % (ref 4–12)
NEUTROPHILS # BLD AUTO: 14.86 THOUSANDS/ΜL (ref 1.85–7.62)
NEUTS SEG NFR BLD AUTO: 85 % (ref 43–75)
NRBC BLD AUTO-RTO: 0 /100 WBCS
OSMOLALITY UR/SERPL-RTO: 308 MMOL/KG (ref 282–298)
PLATELET # BLD AUTO: 181 THOUSANDS/UL (ref 149–390)
PMV BLD AUTO: 10.6 FL (ref 8.9–12.7)
POTASSIUM SERPL-SCNC: 3.7 MMOL/L (ref 3.5–5.3)
RBC # BLD AUTO: 4.06 MILLION/UL (ref 3.88–5.62)
SODIUM SERPL-SCNC: 134 MMOL/L (ref 136–145)
WBC # BLD AUTO: 17.37 THOUSAND/UL (ref 4.31–10.16)

## 2022-01-23 PROCEDURE — 99232 SBSQ HOSP IP/OBS MODERATE 35: CPT | Performed by: INTERNAL MEDICINE

## 2022-01-23 PROCEDURE — 85025 COMPLETE CBC W/AUTO DIFF WBC: CPT | Performed by: INTERNAL MEDICINE

## 2022-01-23 PROCEDURE — 80048 BASIC METABOLIC PNL TOTAL CA: CPT | Performed by: INTERNAL MEDICINE

## 2022-01-23 PROCEDURE — 82948 REAGENT STRIP/BLOOD GLUCOSE: CPT

## 2022-01-23 PROCEDURE — 83930 ASSAY OF BLOOD OSMOLALITY: CPT | Performed by: INTERNAL MEDICINE

## 2022-01-23 RX ORDER — CEFAZOLIN SODIUM 1 G/50ML
1000 SOLUTION INTRAVENOUS EVERY 12 HOURS
Status: DISCONTINUED | OUTPATIENT
Start: 2022-01-23 | End: 2022-01-25

## 2022-01-23 RX ORDER — INSULIN GLARGINE 100 [IU]/ML
70 INJECTION, SOLUTION SUBCUTANEOUS
Status: DISCONTINUED | OUTPATIENT
Start: 2022-01-24 | End: 2022-01-23

## 2022-01-23 RX ORDER — SODIUM CHLORIDE, SODIUM GLUCONATE, SODIUM ACETATE, POTASSIUM CHLORIDE, MAGNESIUM CHLORIDE, SODIUM PHOSPHATE, DIBASIC, AND POTASSIUM PHOSPHATE .53; .5; .37; .037; .03; .012; .00082 G/100ML; G/100ML; G/100ML; G/100ML; G/100ML; G/100ML; G/100ML
50 INJECTION, SOLUTION INTRAVENOUS CONTINUOUS
Status: DISCONTINUED | OUTPATIENT
Start: 2022-01-23 | End: 2022-01-25

## 2022-01-23 RX ORDER — INSULIN GLARGINE 100 [IU]/ML
60 INJECTION, SOLUTION SUBCUTANEOUS ONCE
Status: DISCONTINUED | OUTPATIENT
Start: 2022-01-23 | End: 2022-01-23

## 2022-01-23 RX ORDER — INSULIN GLARGINE 100 [IU]/ML
70 INJECTION, SOLUTION SUBCUTANEOUS
Status: DISCONTINUED | OUTPATIENT
Start: 2022-01-23 | End: 2022-01-24

## 2022-01-23 RX ADMIN — INSULIN GLARGINE 70 UNITS: 100 INJECTION, SOLUTION SUBCUTANEOUS at 21:48

## 2022-01-23 RX ADMIN — INSULIN LISPRO 2 UNITS: 100 INJECTION, SOLUTION INTRAVENOUS; SUBCUTANEOUS at 08:09

## 2022-01-23 RX ADMIN — HEPARIN SODIUM 5000 UNITS: 5000 INJECTION INTRAVENOUS; SUBCUTANEOUS at 05:07

## 2022-01-23 RX ADMIN — HEPARIN SODIUM 5000 UNITS: 5000 INJECTION INTRAVENOUS; SUBCUTANEOUS at 21:33

## 2022-01-23 RX ADMIN — CYCLOBENZAPRINE HYDROCHLORIDE 10 MG: 10 TABLET, FILM COATED ORAL at 08:09

## 2022-01-23 RX ADMIN — INSULIN LISPRO 4 UNITS: 100 INJECTION, SOLUTION INTRAVENOUS; SUBCUTANEOUS at 12:20

## 2022-01-23 RX ADMIN — METOPROLOL TARTRATE 50 MG: 50 TABLET, FILM COATED ORAL at 17:24

## 2022-01-23 RX ADMIN — CYCLOBENZAPRINE HYDROCHLORIDE 10 MG: 10 TABLET, FILM COATED ORAL at 17:24

## 2022-01-23 RX ADMIN — METOPROLOL TARTRATE 50 MG: 50 TABLET, FILM COATED ORAL at 08:09

## 2022-01-23 RX ADMIN — SODIUM CHLORIDE 100 ML/HR: 0.9 INJECTION, SOLUTION INTRAVENOUS at 05:09

## 2022-01-23 RX ADMIN — SODIUM CHLORIDE, SODIUM GLUCONATE, SODIUM ACETATE, POTASSIUM CHLORIDE, MAGNESIUM CHLORIDE, SODIUM PHOSPHATE, DIBASIC, AND POTASSIUM PHOSPHATE 75 ML/HR: .53; .5; .37; .037; .03; .012; .00082 INJECTION, SOLUTION INTRAVENOUS at 12:20

## 2022-01-23 RX ADMIN — CEFAZOLIN SODIUM 1000 MG: 1 SOLUTION INTRAVENOUS at 17:24

## 2022-01-23 RX ADMIN — CYCLOBENZAPRINE HYDROCHLORIDE 10 MG: 10 TABLET, FILM COATED ORAL at 21:33

## 2022-01-23 RX ADMIN — HEPARIN SODIUM 5000 UNITS: 5000 INJECTION INTRAVENOUS; SUBCUTANEOUS at 14:56

## 2022-01-23 NOTE — ASSESSMENT & PLAN NOTE
Lab Results   Component Value Date    EGFR 21 01/22/2022    EGFR 21 01/22/2022    EGFR 21 01/21/2022    CREATININE 2 80 (H) 01/22/2022    CREATININE 2 76 (H) 01/22/2022    CREATININE 2 76 (H) 01/21/2022   POA creatinine 2 10, baseline around 1 2-1 4  · Etiology suspected to be from sepsis/dehdyration  S/P IVF on admission  · Unfortunately creatinine continues to trend up, consulted renal for input    AM labs pending  · Continue to ensure no retention by checking bladder scan, PVRs--has not been done despite orders for several days  · Avoid hypotension  · Holding lasix  · Renal U/S without hydro  · On IVF

## 2022-01-23 NOTE — ASSESSMENT & PLAN NOTE
Recent, December 2021  · Seen by ortho, was WBAT LLE CAM boot  · Recommended outpatient f/u 4 weeks which he has not done   Has not been wearing boot  · Spoke with ortho on 1/21 recommended to check xray  · Xray shows healing fracture with stable alignment  · Will recommend continued WBAT and outpatient ortho f/u

## 2022-01-23 NOTE — ASSESSMENT & PLAN NOTE
1 out of 2 blood cultures from admission with Klebsiella and other with gram positive cocci in clusters and chains  · IV abx as above for one, other suspected contaminant   · ID following

## 2022-01-23 NOTE — ASSESSMENT & PLAN NOTE
Continue lopressor however was increased to 50 mg BID given ongoing tachycardia despite sepsis treatment  · Hold lasix given MARANDA

## 2022-01-23 NOTE — PROGRESS NOTES
[facility]  Progress Note - Jude Boudreaux Sr  1945, 78 y o  male MRN: [id number]  Unit/Bed#: [facility] 603-01 Encounter: [id number]  Primary Care Provider: Anne Finley MD   Date and time admitted to hospital: 1/20/2022 11:50 PM    * Sepsis with acute renal failure without septic shock [facility])  Assessment & Plan  Suspected, tachycardia/tachypnea/leukocytosis on presentation  Patient was complaining of urinary urgency thus suspected source is UTI and UA with pyuria/bacteriuria  Additionally with lactic acidosis resolved s/p IV fluids  · Patient hemodynamically stable and nontoxic appearing at this time  · Currently on cefepime, likely to downgrade to Cefazolin per ID  · Urine culture showing Klebsiella pneumoniae  · Blood cultures 1/2 showing Klebsiella, other showing gram positive cocci in clusters and gram positive cocci in chains which is likely contaminant   · Trend WBC, temperature curve, hemodynamics  · Procal elevated at 17, trend  · Regarding acute renal failure baseline creatinine appears 1 4 and he presented with creat of 2 10  · See further plan below under MARANDA    Bacteremia  Assessment & Plan  1 out of 2 blood cultures from admission with Klebsiella and other with gram positive cocci in clusters and chains  · IV abx as above for one, other suspected contaminant   · ID following    Acute kidney injury superimposed on chronic kidney disease [facility])  Assessment & Plan  Lab Results   Component Value Date    EGFR 21 01/22/2022    EGFR 21 01/22/2022    EGFR 21 01/21/2022    CREATININE 2 80 (H) 01/22/2022    CREATININE 2 76 (H) 01/22/2022    CREATININE 2 76 (H) 01/21/2022   POA creatinine 2 10, baseline around 1 2-1 4  · Etiology suspected to be from sepsis/dehdyration  S/P IVF on admission  · Unfortunately creatinine continues to trend up, consulted renal for input    AM labs pending  · Continue to ensure no retention by checking bladder scan, PVRs--has not been done despite orders for several days  · Avoid hypotension  · Holding lasix  · Renal U/S without hydro  · On IVF     Adenocarcinoma of lung [facility])  Assessment & Plan  Follows with radiation oncology as outpatient, most recent office note reviewed from 9/2021  · He had CTA study on July 23, 2021--revealed no evidence of any disease  · Recommended to have repeat CT chest January 2022 and f/u as outpatienet  · Continue to recommend outpatient f/u    COPD, severe [facility])  Assessment & Plan  Not in acute exacerbation  · Continue Trelegy, p r n  Nebulizers    Fracture of navicular bone of left foot  Assessment & Plan  Recent, December 2021  · Seen by ortho, was WBAT LLE CAM boot  · Recommended outpatient f/u 4 weeks which he has not done  Has not been wearing boot  · Spoke with ortho on 1/21 recommended to check xray  · Xray shows healing fracture with stable alignment  · Will recommend continued WBAT and outpatient ortho f/u    Chronic respiratory failure with hypoxia ([facility] [address] )  Assessment & Plan  On 3 L NC continuously at baseline  · Continue supplemental oxygen and titrate as needed to maintain SaO2 88-94%  · CXR with stable findings     Essential hypertension  Assessment & Plan  Continue lopressor however was increased to 50 mg BID given ongoing tachycardia despite sepsis treatment  · Hold lasix given MARANDA    Chronic diastolic heart failure (HCC)  Assessment & Plan  Wt Readings from Last 3 Encounters:   01/21/22 104 kg (229 lb)   12/16/21 104 kg (229 lb 11 5 oz)   07/25/21 111 kg (244 lb 12 8 oz)     Recent admission with acute on chronic heart failure  Appears fairly euvolemic at this time despite imaging showing loculated pleural effusion on admission     · Continue to hold lasix for now, on IVF per renal given MARANDA  · Monitor daily weights, I/O, volume status  · Low-sodium, fluid-restricted diet; patient admitted to some recent dietary indiscretion thus counseling provided        Hyponatremia  Assessment & Plan  Suspect in setting of hyperglycemia, dehydration  Suspect some element of chronicity   · IVF as above  · Monitor BMP    Type 2 diabetes mellitus with hyperglycemia, with long-term current use of insulin [facility])  Assessment & Plan  Lab Results   Component Value Date    HGBA1C 9 2 (H) 2021       Recent Labs     22  1140 22  1701 22  2107 22  0629   POCGLU 419* 217* 178* 179*       Blood Sugar Average: Last 72 hrs:  · (P) 289   · A1C poorly controlled, with hyperglycemia on admission  · Home regimen: U500 45 units before breakfast and lunch  · Was switched to basal/bolus on admission, continue to titrate PRN  · SSI with Accu-Cheks, carb controlled diet  · Hypoglycemia protocol        VTE Pharmacologic Prophylaxis: VTE Score: 5 Moderate Risk (Score 3-4) - Pharmacological DVT Prophylaxis Ordered: heparin  Patient Centered Rounds: I performed bedside rounds with nursing staff today  Discussions with Specialists or Other Care Team Provider: appreciate ID, renal input    Education and Discussions with Family / Patient: Attempted to update  (son) via phone  Left voicemail  Time Spent for Care: 30 minutes  More than 50% of total time spent on counseling and coordination of care as described above  Current Length of Stay: 2 day(s)  Current Patient Status: Inpatient   Certification Statement: The patient will continue to require additional inpatient hospital stay due to ongoing sepsis tx, MARANDA tx   Discharge Plan: Anticipate discharge in >72 hrs to rehab facility  Code Status: Level 1 - Full Code    Subjective:   Patient reports feeling a bit better  Denies any shortness of breath or difficulty urinating today  Objective:     Vitals:   Temp (24hrs), Av 7 °F (36 5 °C), Min:97 3 °F (36 3 °C), Max:98 9 °F (37 2 °C)    Temp:  [97 3 °F (36 3 °C)-98 9 °F (37 2 °C)] 98 9 °F (37 2 °C)  Resp:  [17-21] 17  BP: ()/(52-77) 105/52  SpO2:  [91 %] 91 %  Body mass index is 31 06 kg/m²       Input and Output Summary (last 24 hours): Intake/Output Summary (Last 24 hours) at 1/23/2022 0752  Last data filed at 1/23/2022 0601  Gross per 24 hour   Intake 1415 ml   Output 950 ml   Net 465 ml       Physical Exam:   Physical Exam  Vitals and nursing note reviewed  Constitutional:       Appearance: He is obese  Comments: On nasal cannula, disheveled appearance   Cardiovascular:      Rate and Rhythm: Normal rate and regular rhythm  Pulmonary:      Effort: No respiratory distress  Breath sounds: No wheezing or rales  Abdominal:      General: There is no distension  Tenderness: There is no abdominal tenderness  Musculoskeletal:      Right lower leg: No edema  Left lower leg: No edema  Skin:     Coloration: Skin is pale  Neurological:      Mental Status: He is oriented to person, place, and time  Psychiatric:      Comments: Slightly strange affect          Additional Data:     Labs:  Results from last 7 days   Lab Units 01/23/22  0646 01/21/22  1117 01/21/22  0018   WBC Thousand/uL 17 37*   < > 23 62*   HEMOGLOBIN g/dL 10 4*   < > 12 2   HEMATOCRIT % 33 1*   < > 37 7   PLATELETS Thousands/uL 181   < > 155   BANDS PCT %  --   --  17*   NEUTROS PCT % 85*   < >  --    LYMPHS PCT % 4*   < >  --    LYMPHO PCT %  --   --  2*   MONOS PCT % 9   < >  --    MONO PCT %  --   --  8   EOS PCT % 1   < > 0    < > = values in this interval not displayed       Results from last 7 days   Lab Units 01/22/22  1945 01/21/22  1117 01/21/22  0018   SODIUM mmol/L 134*   < > 130*   POTASSIUM mmol/L 3 6   < > 3 8   CHLORIDE mmol/L 101   < > 93*   CO2 mmol/L 27   < > 27   BUN mg/dL 64*   < > 32*   CREATININE mg/dL 2 80*   < > 2 10*   ANION GAP mmol/L 6   < > 10   CALCIUM mg/dL 9 7   < > 9 7   ALBUMIN g/dL  --   --  2 5*   TOTAL BILIRUBIN mg/dL  --   --  1 24*   ALK PHOS U/L  --   --  139*   ALT U/L  --   --  13   AST U/L  --   --  24   GLUCOSE RANDOM mg/dL 144*   < > 371*    < > = values in this interval not displayed  Results from last 7 days   Lab Units 01/23/22  0629 01/22/22  2107 01/22/22  1701 01/22/22  1140 01/22/22  0826 01/22/22  0032 01/21/22  2121 01/21/22  1703 01/21/22  1113 01/21/22  0641   POC GLUCOSE mg/dl 179* 178* 217* 419* 354* 317* 337* 263* 282* 344*         Results from last 7 days   Lab Units 01/22/22  1023 01/21/22  0444 01/21/22  0126   LACTIC ACID mmol/L  --  1 7 2 3*   PROCALCITONIN ng/ml 17 47*  --  11 61*       Lines/Drains:  Invasive Devices  Report    Peripheral Intravenous Line            Peripheral IV 12/15/21 Dorsal (posterior); Right Forearm 39 days    Peripheral IV 01/22/22 Distal;Right;Ventral (anterior) Wrist 1 day                      Imaging: No pertinent imaging reviewed      Recent Cultures (last 7 days):   Results from last 7 days   Lab Units 01/21/22  0227 01/21/22  0226   BLOOD CULTURE   --  Klebsiella pneumoniae*   GRAM STAIN RESULT   --  Gram negative rods*  Gram positive cocci in clusters*  Gram positive cocci in chains*   URINE CULTURE  >100,000 cfu/ml Klebsiella pneumoniae*  --        Last 24 Hours Medication List:   Current Facility-Administered Medications   Medication Dose Route Frequency Provider Last Rate    cefepime  2,000 mg Intravenous Q24H Priscila Brown MD 2,000 mg (01/22/22 2660)    cyclobenzaprine  10 mg Oral TID Sindy Gramajo DO      heparin (porcine)  5,000 Units Subcutaneous [facility] Sindy Gramajo DO      insulin glargine  70 Units Subcutaneous HS Jenny Priest PA-C      insulin lispro  1-6 Units Subcutaneous HS Sindy Gramajo DO      insulin lispro  2-12 Units Subcutaneous TID AC Sindy Gramajo DO      insulin lispro  20 Units Subcutaneous TID With Meals Sung RICK Bazan      metoprolol tartrate  50 mg Oral BID Sung RICK Bazan      oxyCODONE-acetaminophen  1 5 tablet Oral Q4H PRN Sindy Gramajo DO      sodium chloride  100 mL/hr Intravenous Continuous Jenny Priest PA-C 100 mL/hr (01/23/22 9638)        Today, Patient Was Seen By: Zion Albarado PA-C    **Please Note: This note may have been constructed using a voice recognition system  **

## 2022-01-23 NOTE — CASE MANAGEMENT
Case Management Assessment & Discharge Planning Note    Patient name Fiona Metz  [facility] 972-35 MRN [id number]  : 1945 Date 2022       Current Admission Date: 2022  Current Admission Diagnosis:Sepsis with acute renal failure without septic shock [facility])   Patient Active Problem List    Diagnosis Date Noted    Bacteremia 2022    Adenocarcinoma of lung ([facility] ) 2022    Acute kidney injury superimposed on chronic kidney disease ([address] ) 2022    Fracture of navicular bone of left foot 2021    Chronic respiratory failure with hypoxia ([address] ) 04/15/2021    Obesity, morbid ([address] ) 2021    PAD (peripheral artery disease) ([facility] ) 2021    History of lung cancer 2020    DDD (degenerative disc disease), lumbar 2020    Lung nodule 2020    Pulmonary hypertension ([address] ) 2019    JACQUELINE (obstructive sleep apnea) 2019    COPD with acute exacerbation ([address] ) 2019    Oxygen dependent 2018    RBBB 2018    CAD (coronary artery disease) 2018    Chronic diastolic heart failure ([address] ) 2018    Pleural effusion on right 10/31/2017    Hyponatremia 2017    Sepsis with acute renal failure without septic shock ([facility] ) 2017    COPD, severe ([address] ) 2017    Diabetic neuropathy ([facility] [address] [facility] ) 2017    Essential hypertension 2016    Venous stasis dermatitis of both lower extremities 11/15/2016    Type 2 diabetes mellitus with hyperglycemia, with long-term current use of insulin ([facility] [address] ) 2016    Obesity (BMI 30-39 9) 2016    Dyslipidemia 2016      LOS (days): 2  Geometric Mean LOS (GMLOS) (days): 3 50  Days to GMLOS:1 2     OBJECTIVE:    Risk of Unplanned Readmission Score: 29         Current admission status: Inpatient       Preferred Pharmacy:   [address] [place] [address], [address] [place] [address]  Phone: [phone] Fax: [phone]    Primary Care Provider: Kay Elizabeth MD    Primary Insurance: [facility]  Secondary Insurance:     ASSESSMENT:  [address],  Representative - Son   Primary Phone: [phone] (Mobile)               Advance Directives  Does patient have a [address]?: Yes  Does patient have Advance Directives?: Yes  Advance Directives: Power of  for health care,Living will    Patient Information  Admitted from[de-identified] Home  Mental Status: Alert  During Assessment patient was accompanied by: Not accompanied during assessment  Assessment information provided by[de-identified] Patient  Support Systems: Son,Self  Type of Current Residence: [place]  In the last 12 months, was there a time when you were not able to pay the mortgage or rent on time?: No  In the last 12 months, was there a time when you did not have a steady place to sleep or slept in a shelter (including now)?: No  Homeless/housing insecurity resource given?: N/A  Living Arrangements: Lives w/ Son  Is patient a ?: No    Activities of Daily Living Prior to Admission  Functional Status: Independent  Completes ADLs independently?: Yes  Ambulates independently?: Yes  Does patient use assisted devices?: Yes  Assisted Devices (DME) used: Walker,Bedside Commode,Straight Cane,Other (Comment) (scooter and hoivonneround)  Does patient currently own DME?: Yes  What DME does the patient currently own?: Straight Cane,Walker,Bedside Commode,Other (Comment) (scooter and hover round)  Does patient have a history of Outpatient Therapy (PT/OT)?: No  Does the patient have a history of Short-Term Rehab?: Yes (Patient states [facility])  Does patient have a history of HHC?: No  Does patient currently have [address]?: No         Patient Information Continued  Income Source: Pension/penitentiary  Does patient have prescription coverage?: Yes  Within the past 12 months, you worried that your food would run out before you got the money to buy more : Never true  Within the past 12 months, the food you bought just didnt last and you didnt have money to get more : Never true  Food insecurity resource given?: N/A  Does patient receive dialysis treatments?: No  Does patient have a history of substance abuse?: No  Does patient have a history of Mental Health Diagnosis?: No         Means of Transportation  Means of Transport to Appts[de-identified] Drives Self  In the past 12 months, has lack of transportation kept you from medical appointments or from getting medications?: No  In the past 12 months, has lack of transportation kept you from meetings, work, or from getting things needed for daily living?: No  Was application for public transport provided?: N/A        DISCHARGE DETAILS:    Discharge planning discussed with[de-identified] Patient  Freedom of Choice: Yes  Comments - Freedom of Choice: Discussed FOC  CM contacted family/caregiver?: No- see comments (Patient refused)  Were Treatment Team discharge recommendations reviewed with patient/caregiver?: Yes  Did patient/caregiver verbalize understanding of patient care needs?: Yes  Were patient/caregiver advised of the risks associated with not following Treatment Team discharge recommendations?: Yes    Contacts  Patient Contacts: Anahi Jesus  Relationship to Patient[de-identified] Family  Contact Method: Phone  Phone Number: [phone]  Reason/Outcome: Continuity of [address]         Is the patient interested in Lu Antunez at discharge?: No    DME Referral Provided  Referral made for DME?: No    Other Referral/Resources/Interventions Provided:  Referral Comments: Agreeable to blanket SNF referral placed in [place] area    CM reviewed d/c planning process including the following: identifying help at home, patient preference for d/c planning needs, Discharge Lounge, [facility] Meds to Bed program, availability of treatment team to discuss questions or concerns patient and/or family may have regarding understanding medications and recognizing signs and symptoms once discharged  CM also encouraged patient to follow up with all recommended appointments after discharge  Patient advised of importance for patient and family to participate in managing patients medical well being      Patient states he had first 2 doses of covid vaccines, unable to provide details at this time

## 2022-01-23 NOTE — PLAN OF CARE
Problem: MOBILITY - ADULT  Goal: Maintain or return to baseline ADL function  Description: INTERVENTIONS:  -  Assess patient's ability to carry out ADLs; assess patient's baseline for ADL function and identify physical deficits which impact ability to perform ADLs (bathing, care of mouth/teeth, toileting, grooming, dressing, etc )  - Assess/evaluate cause of self-care deficits   - Assess range of motion  - Assess patient's mobility; develop plan if impaired  - Assess patient's need for assistive devices and provide as appropriate  - Encourage maximum independence but intervene and supervise when necessary  - Involve family in performance of ADLs  - Assess for home care needs following discharge   - Consider OT consult to assist with ADL evaluation and planning for discharge  - Provide patient education as appropriate  Outcome: Progressing  Goal: Maintains/Returns to pre admission functional level  Description: INTERVENTIONS:  - Perform BMAT or MOVE assessment daily    - Set and communicate daily mobility goal to care team and patient/family/caregiver  - Collaborate with rehabilitation services on mobility goals if consulted  - Perform Range of Motion  times a day  - Reposition patient every  hours    - Dangle patient  times a day  - Stand patient  times a day  - Ambulate patient  times a day  - Out of bed to chair  times a day   - Out of bed for meals  times a day  - Out of bed for toileting  - Record patient progress and toleration of activity level   Outcome: Progressing     Problem: PAIN - ADULT  Goal: Verbalizes/displays adequate comfort level or baseline comfort level  Description: Interventions:  - Encourage patient to monitor pain and request assistance  - Assess pain using appropriate pain scale  - Administer analgesics based on type and severity of pain and evaluate response  - Implement non-pharmacological measures as appropriate and evaluate response  - Consider cultural and social influences on pain and pain management  - Notify physician/advanced practitioner if interventions unsuccessful or patient reports new pain  Outcome: Progressing     Problem: INFECTION - ADULT  Goal: Absence or prevention of progression during hospitalization  Description: INTERVENTIONS:  - Assess and monitor for signs and symptoms of infection  - Monitor lab/diagnostic results  - Monitor all insertion sites, i e  indwelling lines, tubes, and drains  - Monitor endotracheal if appropriate and nasal secretions for changes in amount and color  - [place] appropriate cooling/warming therapies per order  - Administer medications as ordered  - Instruct and encourage patient and family to use good hand hygiene technique  - Identify and instruct in appropriate isolation precautions for identified infection/condition  Outcome: Progressing  Goal: Absence of fever/infection during neutropenic period  Description: INTERVENTIONS:  - Monitor WBC    Outcome: Progressing     Problem: SAFETY ADULT  Goal: Maintain or return to baseline ADL function  Description: INTERVENTIONS:  -  Assess patient's ability to carry out ADLs; assess patient's baseline for ADL function and identify physical deficits which impact ability to perform ADLs (bathing, care of mouth/teeth, toileting, grooming, dressing, etc )  - Assess/evaluate cause of self-care deficits   - Assess range of motion  - Assess patient's mobility; develop plan if impaired  - Assess patient's need for assistive devices and provide as appropriate  - Encourage maximum independence but intervene and supervise when necessary  - Involve family in performance of ADLs  - Assess for home care needs following discharge   - Consider OT consult to assist with ADL evaluation and planning for discharge  - Provide patient education as appropriate  Outcome: Progressing  Goal: Maintains/Returns to pre admission functional level  Description: INTERVENTIONS:  - Perform BMAT or MOVE assessment daily    - Set and communicate daily mobility goal to care team and patient/family/caregiver  - Collaborate with rehabilitation services on mobility goals if consulted  - Perform Range of Motion  times a day  - Reposition patient every  hours    - Dangle patient  times a day  - Stand patient  times a day  - Ambulate patient  times a day  - Out of bed to chair  times a day   - Out of bed for meals times a day  - Out of bed for toileting  - Record patient progress and toleration of activity level   Outcome: Progressing  Goal: Patient will remain free of falls  Description: INTERVENTIONS:  - Educate patient/family on patient safety including physical limitations  - Instruct patient to call for assistance with activity   - Consult OT/PT to assist with strengthening/mobility   - Keep Call bell within reach  - Keep bed low and locked with side rails adjusted as appropriate  - Keep care items and personal belongings within reach  - Initiate and maintain comfort rounds  - Make Fall Risk Sign visible to staff  - Offer Toileting every  Hours, in advance of need  - Initiate/Maintain alarm  - Obtain necessary fall risk management equipment:   - Apply yellow socks and bracelet for high fall risk patients  - Consider moving patient to room near nurses station  Outcome: Progressing     Problem: DISCHARGE PLANNING  Goal: Discharge to home or other facility with appropriate resources  Description: INTERVENTIONS:  - Identify barriers to discharge w/patient and caregiver  - Arrange for needed discharge resources and transportation as appropriate  - Identify discharge learning needs (meds, wound care, etc )  - Arrange for interpretive services to assist at discharge as needed  - Refer to Case Management Department for coordinating discharge planning if the patient needs post-hospital services based on physician/advanced practitioner order or complex needs related to functional status, cognitive ability, or social support system  Outcome: Progressing Problem: Knowledge Deficit  Goal: Patient/family/caregiver demonstrates understanding of disease process, treatment plan, medications, and discharge instructions  Description: Complete learning assessment and assess knowledge base    Interventions:  - Provide teaching at level of understanding  - Provide teaching via preferred learning methods  Outcome: Progressing     Problem: Potential for Falls  Goal: Patient will remain free of falls  Description: INTERVENTIONS:  - Educate patient/family on patient safety including physical limitations  - Instruct patient to call for assistance with activity   - Consult OT/PT to assist with strengthening/mobility   - Keep Call bell within reach  - Keep bed low and locked with side rails adjusted as appropriate  - Keep care items and personal belongings within reach  - Initiate and maintain comfort rounds  - Make Fall Risk Sign visible to staff  - Offer Toileting every  Hours, in advance of need  - Initiate/Maintain alarm  - Obtain necessary fall risk management equipment:  - Apply yellow socks and bracelet for high fall risk patients  - Consider moving patient to room near nurses station  Outcome: Progressing

## 2022-01-23 NOTE — ASSESSMENT & PLAN NOTE
Lab Results   Component Value Date    HGBA1C 9 2 (H) 04/14/2021       Recent Labs     01/22/22  1140 01/22/22  1701 01/22/22  2107 01/23/22  0629   POCGLU 419* 217* 178* 179*       Blood Sugar Average: Last 72 hrs:  · (P) 289   · A1C poorly controlled, with hyperglycemia on admission  · Home regimen: U500 45 units before breakfast and lunch  · Was switched to basal/bolus on admission, continue to titrate PRN  · SSI with Accu-Cheks, carb controlled diet  · Hypoglycemia protocol

## 2022-01-23 NOTE — PROGRESS NOTES
NEPHROLOGY PROGRESS NOTE   Riki Parmar  78 y o  male MRN: [id number]  Unit/Bed#: [facility] 603-01 Encounter: [id number]  Reason for Consult: MARANDA    ASSESSMENT AND PLAN:  78 y  o  man PMH of CKD G 3a (bl creatinine 1 4-1 6 mg/dL),  obesity, hypertension, DM, CHF, COPD on oxygen at home 2-3 L, CAD,  P/w weakness an increase urinary frequency, was found to have a UTI  Nephrology is consulted for MARANDA     MARANDA on CKD stage 3, baseline creatinine 1 4 to 1 6  -MARANDA suspect in the setting of prerenal, urosepsis, ATN, Klebsiella bacteremia  -creatinine seems to have plateaued and slightly improved 2 6 today  -UA shows concentrated sample, 2+ proteinuria, innumerable WBCs/bacteria, no RBCs, 3 to 5 granular casts suggestive of ATN  -renal ultrasound shows normal size kidneys, normal echogenicity, no left-sided hydronephrosis  Right renal pelvis prominence without any caliceal reticulation unchanged from prior study   -continue to closely monitor intake and output  -change IV fluid to IV Plasma-Lyte at 75 mL/hour      Hyponatremia  -overall stable, serum sodium 134 BMP in suresh Ceja -workup include serum osmolality 308, urine sodium, urine osmolality not done yet      CKD suspect in the setting of underlying diabetes     Klebsiella bacteremia  UTI, antibiotic as per primary team  -urine culture shows Klebsiella     Discussed above plan with primary team    SUBJECTIVE:  Patient seen and examined at bedside  Denies chest pain, shortness of breath, nausea or vomiting    OBJECTIVE:  Current Weight: Weight - Scale: 104 kg (229 lb)  Vitals:    01/23/22 0846   BP:    Pulse:    Resp:    Temp:    SpO2: 92%       Intake/Output Summary (Last 24 hours) at 1/23/2022 1055  Last data filed at 1/23/2022 0857  Gross per 24 hour   Intake 1895 ml   Output 800 ml   Net 1095 ml     Wt Readings from Last 3 Encounters:   01/21/22 104 kg (229 lb)   12/16/21 104 kg (229 lb 11 5 oz)   07/25/21 111 kg (244 lb 12 8 oz)     Temp Readings from Last 3 Encounters:   01/23/22 98 9 °F (37 2 °C)   12/15/21 97 7 °F (36 5 °C)   09/14/21 97 7 °F (36 5 °C) (Temporal)     BP Readings from Last 3 Encounters:   01/23/22 105/52   12/16/21 133/66   09/14/21 118/68     Pulse Readings from Last 3 Encounters:   01/21/22 (!) 111   12/16/21 80   09/14/21 90        Physical Examination:  General:  Lying in bed, no acute distress   Eyes:  Mild conjunctival pallor present  ENT:  External examination of ears and nose unremarkable  Neck:  No obvious lymphadenopathy appreciated  Respiratory:  Decreased breath sound at bases  CVS:  S1, S2 present  GI:  Soft, nondistended  CNS:  Active alert oriented x3  Skin:  No new rash in legs  Musculoskeletal:  No obvious new gross deformity noted    Medications:    Current Facility-Administered Medications:     cefepime (MAXIPIME) 2,000 mg in dextrose 5 % 50 mL IVPB, 2,000 mg, Intravenous, Q24H, Evelina Gamble MD, Last Rate: 100 mL/hr at 01/22/22 1828, 2,000 mg at 01/22/22 1828    cyclobenzaprine (FLEXERIL) tablet 10 mg, 10 mg, Oral, TID, Alejandro Monroy DO, 10 mg at 01/23/22 0809    heparin (porcine) subcutaneous injection 5,000 Units, 5,000 Units, Subcutaneous, Q8H [facility], Alejandro Monroy DO, 5,000 Units at 01/23/22 0507    insulin glargine (LANTUS) subcutaneous injection 70 Units 0 7 mL, 70 Units, Subcutaneous, HS, Jenny Priest PA-C, 70 Units at 01/22/22 2125    insulin lispro (HumaLOG) 100 units/mL subcutaneous injection 1-6 Units, 1-6 Units, Subcutaneous, HS, Alejandro Monroy DO, 1 Units at 01/22/22 2126    insulin lispro (HumaLOG) 100 units/mL subcutaneous injection 2-12 Units, 2-12 Units, Subcutaneous, TID AC, 2 Units at 01/23/22 0809 **AND** Fingerstick Glucose (POCT), , , TID AC, Alejandro Monroy DO    insulin lispro (HumaLOG) 100 units/mL subcutaneous injection 20 Units, 20 Units, Subcutaneous, TID With Meals, Radha Henriquez PA-C, 20 Units at 01/23/22 0810    metoprolol tartrate (LOPRESSOR) tablet 50 mg, 50 mg, Oral, BID, Manuela Pool RICK Priest, 50 mg at 01/23/22 0809    oxyCODONE-acetaminophen (PERCOCET) 5-325 mg per tablet 1 5 tablet, 1 5 tablet, Oral, Q4H PRN, Jorje Amin DO, 1 5 tablet at 01/22/22 2124    sodium chloride 0 9 % infusion, 100 mL/hr, Intravenous, Continuous, Denise Green PA-C, Last Rate: 100 mL/hr at 01/23/22 0509, 100 mL/hr at 01/23/22 0509    Laboratory Results:  Results from last 7 days   Lab Units 01/23/22  0646 01/22/22  1945 01/22/22  0457 01/21/22  1908 01/21/22  1117 01/21/22  0018   WBC Thousand/uL 17 37*  --  19 98*  --  20 27* 23 62*   HEMOGLOBIN g/dL 10 4*  --  11 3*  --  11 5* 12 2   HEMATOCRIT % 33 1*  --  35 1*  --  35 6* 37 7   PLATELETS Thousands/uL 181  --  180  --  178 155   SODIUM mmol/L 134* 134* 129* 126* 131* 130*   POTASSIUM mmol/L 3 7 3 6 3 7 3 9 4 0 3 8   CHLORIDE mmol/L 102 101 96* 95* 94* 93*   CO2 mmol/L 24 27 23 23 28 27   BUN mg/dL 66* 64* 55* 51* 43* 32*   CREATININE mg/dL 2 68* 2 80* 2 76* 2 76* 2 70* 2 10*   CALCIUM mg/dL 8 4 9 7 8 2* 8 7 8 9 9 7        kidney and bladder   Final Result by Shelley Olivo MD (01/21 9647)      No hydronephrosis  Prominence of the right renal pelvis without any calyceal reticulation, unchanged from the previous study       Workstation performed: [id number]         XR ankle 3+ vw left   Final Result by Samir Arora MD (01/22 4819)      Healing avulsion fracture of the navicular with stable alignment  Workstation performed: [id number]         CT head without contrast   Final Result by Bin Barclay DO (01/21 2757)      Anterior subluxation of the left of the temporomandibular joint is questioned; no calvarial fracture or acute intracranial abnormality is seen otherwise  Other findings as above                    Workstation performed: [id number]         XR chest portable   Final Result by Jose Antonio Fowler MD (01/21 0802)      Stable right lung volume loss with known right lung base round atelectasis and loculated pleural effusion  Workstation performed: [id number]             Portions of the record may have been created with voice recognition software  Occasional wrong word or "sound a like" substitutions may have occurred due to the inherent limitations of voice recognition software  Read the chart carefully and recognize, using context, where substitutions have occurred

## 2022-01-23 NOTE — CONSULTS
Consultation - Infectious Disease   Mehrdad Pardo  78 y o  male MRN: [id number]  Unit/Bed#: [facility] 603-01 Encounter: [id number]      Inpatient consult to Infectious Diseases  Consult performed by: Jong Trejo MD  Consult ordered by: Arabella Escalona PA-C          IMPRESSION & RECOMMENDATIONS:   Impression:  1  Suspected Klebsiella pneumoniae urosepsis with MARANDA on CKD  R/0 Gram-positive cocci contaminant  2  COPD with Chronic respiratory failure with hypoxia  3  Essential hypertension  4  Chronic diastolic CHF   5  Type 2 DM  6  History of prostate carcinoma     Recommendations:    Discuss with the primary service  1  Check final results of blood and cultures for Klebsiella susceptibilities and identification of the Gram-positive cocci in clusters and chains  2  At this point suspect that the Gram-positive cocci isolates in the single blood culture are a likely contaminant and will not specifically Rx  3  Continue cefepime until susceptibilities of Klebsiella are available  Hopefully will be able to downsize antibiotic to cefazolin         HISTORY OF PRESENT ILLNESS:    Reason for Consult:  Sepsis with bacteremia  HPI: Mehrdad Pardo  is a 78y o  year old male with a history of oxygen-dependent COPD and DM type 2 who was admitted yesterday from the emergency room complaining of 1 week of weakness with falling  yesterday due to leg weakness  He also had urinary urgency, frequency and difficulty initiating stream 4 weeks  One of 2 blood cultures taken yesterday is showing Klebsiella pneumoniae as is a urine culture showing greater than 100,000 of the same with susceptibilities pending  A 2nd blood culture is showing Gram-positive cocci in clusters and chains  SARs COVID 2, RSV and influenza PCR negative  1/20 the patient was begun on ceftriaxone IV which was continued until today switch to cefepime 2 g Q 24 hours IV  He has become afebrile with an elevated WBC count of 19,980    Ultrasound of the kidneys and bladder show a partially distended bladder, left kidney without hydronephrosis and right kidney showing stable prominence of the right renal pelvis  Review of Systems   Constitutional: Positive for activity change and fatigue  Respiratory: Positive for cough (White sputum) and shortness of breath  Genitourinary: Positive for difficulty urinating, frequency and urgency  Neurological: Positive for weakness and headaches  A [id number] point system-based review of systems is otherwise negative  PAST MEDICAL HISTORY:  Past Medical History:   Diagnosis Date    Abdominal pain     Cardiac disease     CHF (congestive heart failure) (HCC)     COPD, severe (HCC)     Coronary artery disease     DDD (degenerative disc disease), lumbar 9/1/2020    Diabetes mellitus (HCC)     Dyspnea     GERD (gastroesophageal reflux disease)     Hyperlipidemia     Hypertension     MI (myocardial infarction) ([facility] [address] )     with 3 stents    Nodule of apex of right lung     JACQUELINE (obstructive sleep apnea)     Prostate cancer [facility])      Past Surgical History:   Procedure Laterality Date    ABDOMINAL SURGERY      exploratory    ANGIOPLASTY      3 stents    APPENDECTOMY      COLONOSCOPY  2015    CT NEEDLE BIOPSY LUNG  11/3/2020    ESOPHAGOGASTRODUODENOSCOPY N/A 10/2/2017    Procedure: ESOPHAGOGASTRODUODENOSCOPY (EGD); Surgeon: Goldy Reece MD;  Location: [facility];   Service: Gastroenterology    IR THORACENTESIS  11/3/2020    KNEE CARTILAGE SURGERY      OTHER SURGICAL HISTORY      stent placement    PROSTATE SURGERY      SKIN GRAFT      Basal cell CA back       FAMILY HISTORY:  Non-contributory    SOCIAL HISTORY:  Social History   /Civil Union  Social History     Substance and Sexual Activity   Alcohol Use Not Currently     Social History     Substance and Sexual Activity   Drug Use No     Social History     Tobacco Use   Smoking Status Former Smoker    Packs/day: 1 50    Years: 50 00    Pack years: 75 00    Start date: 38    Quit date: 2020    Years since quittin 5   Smokeless Tobacco Never Used       ALLERGIES:  Allergies   Allergen Reactions    Crestor [Rosuvastatin] Other (See Comments)     Unknown      Metformin GI Intolerance       MEDICATIONS:  All current active medications have been reviewed  PHYSICAL EXAM:  Temp:  [97 3 °F (36 3 °C)-98 7 °F (37 1 °C)] 97 3 °F (36 3 °C)  HR:  [111] 111  Resp:  [18-21] 21  BP: ()/(62-88) 87/62  SpO2:  [91 %] 91 %  Temp (24hrs), Av 8 °F (36 6 °C), Min:97 3 °F (36 3 °C), Max:98 7 °F (37 1 °C)  Current: Temperature: (!) 97 3 °F (36 3 °C)    Intake/Output Summary (Last 24 hours) at 2022  Last data filed at 2022  Gross per 24 hour   Intake 240 ml   Output 650 ml   Net -410 ml       General Appearance:  Appearing chronically ill  nontoxic, and in no distress, appears stated age   Head:  Normocephalic, without obvious abnormality, atraumatic   Eyes:  PERRL, conjunctiva pink and sclera anicteric, both eyes   Nose: Nares normal, mucosa normal, no drainage   Throat: Oropharynx moist without lesions; lips, mucosa, and tongue normal; many missing and carious teeth l   Neck: Supple, symmetrical, trachea midline, no adenopathy, no tenderness/mass/nodules   Back:   Symmetric, no curvature, ROM normal, no CVA tenderness   Lungs:   Diffuse rhonchi, hyper resonant, decreased respiratory expansion, right-sided dullness   Chest Wall:  No tenderness or deformity   Heart:  Regular rate and rhythm, S1, S2 normal, no murmur, rub or gallop   Abdomen:   Soft, markedly protuberant non-tender, non-distended, positive bowel sounds, no masses, no organomegaly    No CVA tenderness   Extremities: Lower extremity stasis changes   Skin: As above   Neurologic: Alert and oriented times 3, extremity strength 5/5 and symmetric           Invasive Devices:   Peripheral IV 12/15/21 Dorsal (posterior); Right Forearm (Active)       Peripheral IV 01/22/22 Distal;Right;Ventral (anterior) Wrist (Active)   Site Assessment WDL 01/22/22 1940   Dressing Type Transparent;Securing device 01/22/22 0852   Line Status Infusing 01/22/22 0852   Dressing Status Clean;Dry; Intact 01/22/22 0852   Dressing Change Due 01/26/22 01/22/22 0852   Reason Not Rotated Not due 01/22/22 0400       LABS, IMAGING, & OTHER STUDIES:  Lab Results:      I have personally reviewed pertinent labs  Results from last 7 days   Lab Units 01/22/22 0457 01/21/22  1117 01/21/22  0018   WBC Thousand/uL 19 98* 20 27* 23 62*   HEMOGLOBIN g/dL 11 3* 11 5* 12 2   PLATELETS Thousands/uL 180 178 155     Results from last 7 days   Lab Units 01/22/22  1945 01/22/22 0457 01/22/22 0457 01/21/22  1908 01/21/22  1908 01/21/22 1117 01/21/22  0018   SODIUM mmol/L 134*  --  129*  --  126*   < > 130*   POTASSIUM mmol/L 3 6   < > 3 7   < > 3 9   < > 3 8   CHLORIDE mmol/L 101   < > 96*   < > 95*   < > 93*   CO2 mmol/L 27   < > 23   < > 23   < > 27   BUN mg/dL 64*   < > 55*   < > 51*   < > 32*   CREATININE mg/dL 2 80*   < > 2 76*   < > 2 76*   < > 2 10*   EGFR ml/min/1 73sq m 21   < > 21   < > 21   < > 30   CALCIUM mg/dL 9 7   < > 8 2*   < > 8 7   < > 9 7   AST U/L  --   --   --   --   --   --  24   ALT U/L  --   --   --   --   --   --  13   ALK PHOS U/L  --   --   --   --   --   --  139*    < > = values in this interval not displayed  Results from last 7 days   Lab Units 01/21/22 0227 01/21/22 0226   BLOOD CULTURE   --  Klebsiella pneumoniae*   GRAM STAIN RESULT   --  Gram negative rods*  Gram positive cocci in clusters*  Gram positive cocci in chains*   URINE CULTURE  >100,000 cfu/ml Klebsiella pneumoniae*  --        Imaging Studies:   I have personally reviewed pertinent imaging study reports and images in PACS  EKG, Pathology, and Other Studies:   I have personally reviewed pertinent reports

## 2022-01-23 NOTE — ASSESSMENT & PLAN NOTE
Suspected, tachycardia/tachypnea/leukocytosis on presentation  Patient was complaining of urinary urgency thus suspected source is UTI and UA with pyuria/bacteriuria    Additionally with lactic acidosis resolved s/p IV fluids  · Patient hemodynamically stable and nontoxic appearing at this time  · Currently on cefepime, likely to downgrade to Cefazolin per ID  · Urine culture showing Klebsiella pneumoniae  · Blood cultures 1/2 showing Klebsiella, other showing gram positive cocci in clusters and gram positive cocci in chains which is likely contaminant   · Trend WBC, temperature curve, hemodynamics  · Procal elevated at 17, trend  · Regarding acute renal failure baseline creatinine appears 1 4 and he presented with creat of 2 10  · See further plan below under MARANDA

## 2022-01-24 LAB
ANION GAP SERPL CALCULATED.3IONS-SCNC: 6 MMOL/L (ref 4–13)
BACTERIA BLD CULT: ABNORMAL
BASOPHILS # BLD AUTO: 0.02 THOUSANDS/ΜL (ref 0–0.1)
BASOPHILS NFR BLD AUTO: 0 % (ref 0–1)
BUN SERPL-MCNC: 60 MG/DL (ref 5–25)
CALCIUM SERPL-MCNC: 8 MG/DL (ref 8.3–10.1)
CHLORIDE SERPL-SCNC: 104 MMOL/L (ref 100–108)
CO2 SERPL-SCNC: 26 MMOL/L (ref 21–32)
CREAT SERPL-MCNC: 2.12 MG/DL (ref 0.6–1.3)
EOSINOPHIL # BLD AUTO: 0.09 THOUSAND/ΜL (ref 0–0.61)
EOSINOPHIL NFR BLD AUTO: 1 % (ref 0–6)
ERYTHROCYTE [DISTWIDTH] IN BLOOD BY AUTOMATED COUNT: 15.6 % (ref 11.6–15.1)
GFR SERPL CREATININE-BSD FRML MDRD: 29 ML/MIN/1.73SQ M
GLUCOSE SERPL-MCNC: 102 MG/DL (ref 65–140)
GLUCOSE SERPL-MCNC: 139 MG/DL (ref 65–140)
GLUCOSE SERPL-MCNC: 156 MG/DL (ref 65–140)
GLUCOSE SERPL-MCNC: 60 MG/DL (ref 65–140)
GLUCOSE SERPL-MCNC: 74 MG/DL (ref 65–140)
GLUCOSE SERPL-MCNC: 80 MG/DL (ref 65–140)
GLUCOSE SERPL-MCNC: 92 MG/DL (ref 65–140)
GRAM STN SPEC: ABNORMAL
GRAM STN SPEC: ABNORMAL
HCT VFR BLD AUTO: 31.2 % (ref 36.5–49.3)
HGB BLD-MCNC: 9.9 G/DL (ref 12–17)
IMM GRANULOCYTES # BLD AUTO: 0.09 THOUSAND/UL (ref 0–0.2)
IMM GRANULOCYTES NFR BLD AUTO: 1 % (ref 0–2)
LYMPHOCYTES # BLD AUTO: 0.62 THOUSANDS/ΜL (ref 0.6–4.47)
LYMPHOCYTES NFR BLD AUTO: 5 % (ref 14–44)
MCH RBC QN AUTO: 25.6 PG (ref 26.8–34.3)
MCHC RBC AUTO-ENTMCNC: 31.7 G/DL (ref 31.4–37.4)
MCV RBC AUTO: 81 FL (ref 82–98)
MONOCYTES # BLD AUTO: 1 THOUSAND/ΜL (ref 0.17–1.22)
MONOCYTES NFR BLD AUTO: 8 % (ref 4–12)
NEUTROPHILS # BLD AUTO: 10.04 THOUSANDS/ΜL (ref 1.85–7.62)
NEUTS SEG NFR BLD AUTO: 85 % (ref 43–75)
NRBC BLD AUTO-RTO: 0 /100 WBCS
PLATELET # BLD AUTO: 195 THOUSANDS/UL (ref 149–390)
PMV BLD AUTO: 10.1 FL (ref 8.9–12.7)
POTASSIUM SERPL-SCNC: 3.6 MMOL/L (ref 3.5–5.3)
PROCALCITONIN SERPL-MCNC: 6.86 NG/ML
PROCALCITONIN SERPL-MCNC: 9.39 NG/ML
RBC # BLD AUTO: 3.86 MILLION/UL (ref 3.88–5.62)
SODIUM SERPL-SCNC: 136 MMOL/L (ref 136–145)
WBC # BLD AUTO: 11.86 THOUSAND/UL (ref 4.31–10.16)

## 2022-01-24 PROCEDURE — 80048 BASIC METABOLIC PNL TOTAL CA: CPT | Performed by: INTERNAL MEDICINE

## 2022-01-24 PROCEDURE — 99232 SBSQ HOSP IP/OBS MODERATE 35: CPT | Performed by: INTERNAL MEDICINE

## 2022-01-24 PROCEDURE — 99231 SBSQ HOSP IP/OBS SF/LOW 25: CPT | Performed by: INTERNAL MEDICINE

## 2022-01-24 PROCEDURE — 85025 COMPLETE CBC W/AUTO DIFF WBC: CPT | Performed by: INTERNAL MEDICINE

## 2022-01-24 PROCEDURE — 82948 REAGENT STRIP/BLOOD GLUCOSE: CPT

## 2022-01-24 PROCEDURE — 84145 PROCALCITONIN (PCT): CPT | Performed by: INTERNAL MEDICINE

## 2022-01-24 RX ORDER — INSULIN GLARGINE 100 [IU]/ML
50 INJECTION, SOLUTION SUBCUTANEOUS
Status: DISCONTINUED | OUTPATIENT
Start: 2022-01-24 | End: 2022-01-26

## 2022-01-24 RX ADMIN — METOPROLOL TARTRATE 50 MG: 50 TABLET, FILM COATED ORAL at 08:55

## 2022-01-24 RX ADMIN — HEPARIN SODIUM 5000 UNITS: 5000 INJECTION INTRAVENOUS; SUBCUTANEOUS at 22:37

## 2022-01-24 RX ADMIN — CYCLOBENZAPRINE HYDROCHLORIDE 10 MG: 10 TABLET, FILM COATED ORAL at 15:45

## 2022-01-24 RX ADMIN — METOPROLOL TARTRATE 50 MG: 50 TABLET, FILM COATED ORAL at 18:09

## 2022-01-24 RX ADMIN — CEFAZOLIN SODIUM 1000 MG: 1 SOLUTION INTRAVENOUS at 05:26

## 2022-01-24 RX ADMIN — HEPARIN SODIUM 5000 UNITS: 5000 INJECTION INTRAVENOUS; SUBCUTANEOUS at 05:26

## 2022-01-24 RX ADMIN — Medication 4 G: at 11:52

## 2022-01-24 RX ADMIN — INSULIN GLARGINE 50 UNITS: 100 INJECTION, SOLUTION SUBCUTANEOUS at 22:37

## 2022-01-24 RX ADMIN — INSULIN LISPRO 2 UNITS: 100 INJECTION, SOLUTION INTRAVENOUS; SUBCUTANEOUS at 18:09

## 2022-01-24 RX ADMIN — CYCLOBENZAPRINE HYDROCHLORIDE 10 MG: 10 TABLET, FILM COATED ORAL at 22:37

## 2022-01-24 RX ADMIN — SODIUM CHLORIDE, SODIUM GLUCONATE, SODIUM ACETATE, POTASSIUM CHLORIDE, MAGNESIUM CHLORIDE, SODIUM PHOSPHATE, DIBASIC, AND POTASSIUM PHOSPHATE 75 ML/HR: .53; .5; .37; .037; .03; .012; .00082 INJECTION, SOLUTION INTRAVENOUS at 01:37

## 2022-01-24 RX ADMIN — HEPARIN SODIUM 5000 UNITS: 5000 INJECTION INTRAVENOUS; SUBCUTANEOUS at 14:14

## 2022-01-24 RX ADMIN — CYCLOBENZAPRINE HYDROCHLORIDE 10 MG: 10 TABLET, FILM COATED ORAL at 08:55

## 2022-01-24 RX ADMIN — CEFAZOLIN SODIUM 1000 MG: 1 SOLUTION INTRAVENOUS at 18:09

## 2022-01-24 NOTE — PROGRESS NOTES
Progress Note - Infectious Disease   Miryam Mars Sr  78 y o  male MRN: [id number]  Unit/Bed#: [facility] 603-01 Encounter: [id number]      Impression:  1  Klebsiella pneumoniae urosepsis with MARANDA on CKD  Gram-positive cocci contaminant  2  COPD with Chronic respiratory failure with hypoxia  3  Essential hypertension  4  Chronic diastolic CHF   5  Type 2 DM  6  History of prostate carcinoma     Recommendations:   Patient is afebrile with WBC count 11,860  1  One blood and urine culture showing Klebsiella pneumoniae that is cefazolin susceptible  The other blood culture showing a coagulase-negative Staphylococcus which is a contaminant  2  Cefepime was switched to cefazolin 1 g q 12 hours IV  If patient remains stable should be able to switch to p o  Cephalexin shortly for additional week    Discussed with the primary service    Antibiotics:   1  Cefazolin 1 g q 12 hours IV, day 6 total antibiotic Rx    Subjective:  Has less pain in both legs and back  Denies fevers, chills, or sweats  Denies nausea, vomiting, or diarrhea  Objective:  Vitals:  Temp:  [97 2 °F (36 2 °C)-99 6 °F (37 6 °C)] 97 5 °F (36 4 °C)  HR:  [91-94] 92  Resp:  [18-19] 19  BP: (114-136)/(57-79) 123/66  SpO2:  [99 %] 99 %  Temp (24hrs), Av 1 °F (36 7 °C), Min:97 2 °F (36 2 °C), Max:99 6 °F (37 6 °C)  Current: Temperature: 97 5 °F (36 4 °C)    Physical Exam:     General Appearance:  Alert, chronically ill-appearing nontoxic, no acute distress  Throat: Oropharynx moist without lesions  Lips, mucosa, and tongue normal   Neck: Supple, symmetrical, trachea midline, no adenopathy,  no tenderness/mass/nodules   Lungs:   Hyper resonant few diffuse rhonchi, decreased respiratory expansion    Heart:  Regular rate and rhythm, S1, S2 normal, no murmur, rub or gallop   Abdomen:   Soft, non-tender, markedly protuberant non-distended, positive bowel sounds    No masses, no organomegaly    No CVA tenderness   Extremities: Lower extremity stasis changes   Skin: As above  Invasive Devices  Report    Peripheral Intravenous Line            Peripheral IV 12/15/21 Dorsal (posterior); Right Forearm 40 days    Peripheral IV 01/22/22 Distal;Right;Ventral (anterior) Wrist 2 days                Labs, Imaging, & Other studies:   All pertinent labs were personally reviewed  Results from last 7 days   Lab Units 01/24/22  0434 01/23/22  0646 01/22/22  0457   WBC Thousand/uL 11 86* 17 37* 19 98*   HEMOGLOBIN g/dL 9 9* 10 4* 11 3*   PLATELETS Thousands/uL 195 181 180     Results from last 7 days   Lab Units 01/24/22  0434 01/23/22  0646 01/23/22  0646 01/22/22  1945 01/22/22  1945 01/21/22  1117 01/21/22  0018   SODIUM mmol/L 136  --  134*  --  134*   < > 130*   POTASSIUM mmol/L 3 6   < > 3 7   < > 3 6   < > 3 8   CHLORIDE mmol/L 104   < > 102   < > 101   < > 93*   CO2 mmol/L 26   < > 24   < > 27   < > 27   BUN mg/dL 60*   < > 66*   < > 64*   < > 32*   CREATININE mg/dL 2 12*   < > 2 68*   < > 2 80*   < > 2 10*   EGFR ml/min/1 73sq m 29   < > 22   < > 21   < > 30   CALCIUM mg/dL 8 0*   < > 8 4   < > 9 7   < > 9 7   AST U/L  --   --   --   --   --   --  24   ALT U/L  --   --   --   --   --   --  13   ALK PHOS U/L  --   --   --   --   --   --  139*    < > = values in this interval not displayed       Results from last 7 days   Lab Units 01/21/22 0227 01/21/22 0226   BLOOD CULTURE   --  Staphylococcus coagulase negative*  Staphylococcus coagulase negative*  Globicatella sanguinis*  Klebsiella pneumoniae*   GRAM STAIN RESULT   --  Gram positive cocci in clusters*  Gram positive cocci in chains*  Gram negative rods*   URINE CULTURE  >100,000 cfu/ml Klebsiella pneumoniae*  --

## 2022-01-24 NOTE — ASSESSMENT & PLAN NOTE
Lab Results   Component Value Date    EGFR 29 01/24/2022    EGFR 22 01/23/2022    EGFR 21 01/22/2022    CREATININE 2 12 (H) 01/24/2022    CREATININE 2 68 (H) 01/23/2022    CREATININE 2 80 (H) 01/22/2022   POA creatinine 2 10, baseline around 1 2-1 4  · Etiology suspected to be from sepsis/dehdyration  S/P IVF on admission  · Continue to ensure no retention by checking bladder scan, PVRs--has not been done despite orders for several days  · Avoid hypotension  · Holding lasix  · Renal U/S without hydro  · Nephro changed IVF to IV Plasma-Lyte at 75 mL/hour

## 2022-01-24 NOTE — ASSESSMENT & PLAN NOTE
Lab Results   Component Value Date    HGBA1C 9 2 (H) 04/14/2021       Recent Labs     01/24/22  0230 01/24/22  0614 01/24/22  1149 01/24/22  1224   POCGLU 102 74 60* 92       Blood Sugar Average: Last 72 hrs:  (P) 674 [id number]

## 2022-01-24 NOTE — CASE MANAGEMENT
Case Management Progress Note    Patient name Opal Mcneal  Location [facility] 603/[facility] 819-46 MRN [id number]  : 1945 Date 2022       LOS (days): 3  Geometric Mean LOS (GMLOS) (days): 4 80  Days to GMLOS:1 3        OBJECTIVE:        Current admission status: Inpatient  Preferred Pharmacy:   [address] [place] [address], [address] [place] [address]  Phone: [phone] Fax: [phone]    Primary Care Provider: Aditi Benz MD    Primary Insurance: [facility]  Secondary Insurance:     PROGRESS NOTE: TC to son, Willow Witt, requesting details regarding covid vaccinations for patient  Requesting TC back to this CM with details

## 2022-01-24 NOTE — PROGRESS NOTES
[address] [facility]  Progress Note - Brian Hollow Sr  1945, 78 y o  male MRN: [id number]  Unit/Bed#: [facility] 603-01 Encounter: [id number]  Primary Care Provider: Maximiliano Hamm MD   Date and time admitted to hospital: 1/20/2022 11:50 PM    * Sepsis with acute renal failure without septic shock [facility])  Assessment & Plan  Suspected, tachycardia/tachypnea/leukocytosis on presentation  Patient was complaining of urinary urgency thus suspected source is UTI and UA with pyuria/bacteriuria  Additionally with lactic acidosis resolved s/p IV fluids  · Patient hemodynamically stable and nontoxic appearing at this time  · Urine culture showing Klebsiella pneumoniae  · Blood cultures 1/2 showing Klebsiella, other showing gram positive cocci in clusters and gram positive cocci in chains which is likely contaminant   · Trend WBC, temperature curve, hemodynamics - Most recent WBC 11 86 1/24, trending down   · Procal elevated at 17, trend - most recent procal 9 on 1/24  · Downgraded to Cefazolin per ID, should be able to switch to p o  Cephalexin shortly if remains stable   · Regarding acute renal failure baseline creatinine appears 1 4 and he presented with creat of 2 10  ? See further plan below under MARANDA      Bacteremia  Assessment & Plan  1 out of 2 blood cultures from admission with Klebsiella and other with gram positive cocci in clusters and chains  · IV abx as above for one, other suspected contaminant   · ID following      Acute kidney injury superimposed on chronic kidney disease [facility])  Assessment & Plan  Lab Results   Component Value Date    EGFR 29 01/24/2022    EGFR 22 01/23/2022    EGFR 21 01/22/2022    CREATININE 2 12 (H) 01/24/2022    CREATININE 2 68 (H) 01/23/2022    CREATININE 2 80 (H) 01/22/2022   POA creatinine 2 10, baseline around 1 2-1 4  · Etiology suspected to be from sepsis/dehdyration   S/P IVF on admission  · Continue to ensure no retention by checking bladder scan, PVRs--has not been done despite orders for several days  · Avoid hypotension  · Holding lasix  · Renal U/S without hydro  · Nephro changed IVF to IV Plasma-Lyte at 75 mL/hour  Adenocarcinoma of lung [facility])  Assessment & Plan  Follows with radiation oncology as outpatient, most recent office note reviewed from 9/2021  · He had CTA study on July 23, 2021--revealed no evidence of any disease  · Recommended to have repeat CT chest January 2022 and f/u as outpatienet  · Continue to recommend outpatient f/u      COPD, severe [facility])  Assessment & Plan  Not in acute exacerbation  · Continue Trelegy, p r n  Nebulizers      Fracture of navicular bone of left foot  Assessment & Plan  Recent, December 2021  · Seen by ortho, was WBAT LLE CAM boot  · Recommended outpatient f/u 4 weeks which he has not done  Has not been wearing boot  · Spoke with ortho on 1/21 recommended to check xray  ? Xray shows healing fracture with stable alignment  ? Will recommend continued WBAT and outpatient ortho f/u       Chronic respiratory failure with hypoxia ([address] )  Assessment & Plan  On 3 L NC continuously at baseline  · Continue supplemental oxygen and titrate as needed to maintain SaO2 88-94%  · CXR with stable findings     Essential hypertension  Assessment & Plan  Continue lopressor however was increased to 50 mg BID given ongoing tachycardia despite sepsis treatment  · Hold lasix given MARANDA      Chronic diastolic heart failure (HCC)  Assessment & Plan  Wt Readings from Last 3 Encounters:   01/24/22 109 kg (240 lb)   12/16/21 104 kg (229 lb 11 5 oz)   07/25/21 111 kg (244 lb 12 8 oz)     Recent admission with acute on chronic heart failure  Appears fairly euvolemic at this time despite imaging showing loculated pleural effusion on admission     · Continue to hold lasix for now, on Plasma-Lyte per renal given MARANDA  · Monitor daily weights, I/O, volume status  · Low-sodium, fluid-restricted diet; patient admitted to some recent dietary indiscretion thus counseling provided          Hyponatremia  Assessment & Plan  Suspect in setting of hyperglycemia, dehydration  Suspect some element of chronicity   · Plasma-Lyte as above  · Monitor BMP      Type 2 diabetes mellitus with hyperglycemia, with long-term current use of insulin [facility])  Assessment & Plan  Lab Results   Component Value Date    HGBA1C 9 2 (H) 04/14/2021       Recent Labs     01/23/22  1656 01/23/22  2119 01/24/22  0230 01/24/22  0614   POCGLU 75 73 102 74       Blood Sugar Average: Last 72 hrs:  (P) 230 [id number]   · A1C poorly controlled, with hyperglycemia on admission  · Home regimen: U500 45 units before breakfast and lunch  · Was switched to basal/bolus on admission, continue to titrate PRN  · SSI with Accu-Cheks, carb controlled diet  · Hypoglycemia protocol      · Hypoglycemia noted prior to lunchtime dose: insulin adjustment as follows, basal dose adjusted to 50 units QHS, mealtime basal dose adjusted to 10 units     VTE Pharmacologic Prophylaxis: VTE Score: 5 High Risk (Score >/= 5) - Pharmacological DVT Prophylaxis Ordered: heparin  Sequential Compression Devices Ordered  Patient Centered Rounds: I performed bedside rounds with nursing staff today  Discussions with Specialists or Other Care Team Provider: nursing and case management     Education and Discussions with Family / Patient: Patient declined call to   Time Spent for Care: 30 minutes  More than 50% of total time spent on counseling and coordination of care as described above  Current Length of Stay: 3 day(s)  Current Patient Status: Inpatient   Certification Statement: The patient will continue to require additional inpatient hospital stay due to medical management and monitoring of ongoing sepsis, MARANDA tx  Discharge Plan: Anticipate discharge in 24-48 hrs to rehab facility  Code Status: Level 1 - Full Code     Subjective:   Patient reports doing well, feeling better today   Offers no complaints  Denies chills, sob/cp, dysuria, difficulty urinating, or pain in BL LEs  Objective:     Vitals:   Temp (24hrs), Av 2 °F (36 8 °C), Min:97 2 °F (36 2 °C), Max:99 6 °F (37 6 °C)    Temp:  [97 2 °F (36 2 °C)-99 6 °F (37 6 °C)] 97 2 °F (36 2 °C)  HR:  [91-94] 92  Resp:  [18] 18  BP: (114-136)/(57-79) 136/66  SpO2:  [99 %] 99 %  Body mass index is 32 55 kg/m²  Input and Output Summary (last 24 hours): Intake/Output Summary (Last 24 hours) at 2022 1408  Last data filed at 2022 1255  Gross per 24 hour   Intake 2076 25 ml   Output 1600 ml   Net 476 25 ml       Physical Exam:   Physical Exam  Vitals and nursing note reviewed  Constitutional:       Appearance: He is obese  Comments: On nasal cannula, disheveled appearance   Eyes:      Extraocular Movements: Extraocular movements intact  Conjunctiva/sclera: Conjunctivae normal       Pupils: Pupils are equal, round, and reactive to light  Cardiovascular:      Rate and Rhythm: Normal rate and regular rhythm  Pulses: Normal pulses  Heart sounds: Normal heart sounds, S1 normal and S2 normal    Pulmonary:      Effort: Pulmonary effort is normal  No respiratory distress  Breath sounds: Normal breath sounds  No wheezing or rales  Abdominal:      General: There is no distension  Tenderness: There is no abdominal tenderness  Musculoskeletal:      Right lower le+ Edema present  Left lower le+ Edema present  Skin:     General: Skin is warm and dry  Coloration: Skin is pale  Neurological:      Mental Status: He is oriented to person, place, and time     Psychiatric:         Mood and Affect: Mood normal           Additional Data:     Labs:  Results from last 7 days   Lab Units 22  0434 22  1117 22  0018   WBC Thousand/uL 11 86*   < > 23 62*   HEMOGLOBIN g/dL 9 9*   < > 12 2   HEMATOCRIT % 31 2*   < > 37 7   PLATELETS Thousands/uL 195   < > 155   BANDS PCT %  --   --  17* NEUTROS PCT % 85*   < >  --    LYMPHS PCT % 5*   < >  --    LYMPHO PCT %  --   --  2*   MONOS PCT % 8   < >  --    MONO PCT %  --   --  8   EOS PCT % 1   < > 0    < > = values in this interval not displayed  Results from last 7 days   Lab Units 01/24/22  0434 01/21/22  1117 01/21/22  0018   SODIUM mmol/L 136   < > 130*   POTASSIUM mmol/L 3 6   < > 3 8   CHLORIDE mmol/L 104   < > 93*   CO2 mmol/L 26   < > 27   BUN mg/dL 60*   < > 32*   CREATININE mg/dL 2 12*   < > 2 10*   ANION GAP mmol/L 6   < > 10   CALCIUM mg/dL 8 0*   < > 9 7   ALBUMIN g/dL  --   --  2 5*   TOTAL BILIRUBIN mg/dL  --   --  1 24*   ALK PHOS U/L  --   --  139*   ALT U/L  --   --  13   AST U/L  --   --  24   GLUCOSE RANDOM mg/dL 80   < > 371*    < > = values in this interval not displayed  Results from last 7 days   Lab Units 01/24/22  1224 01/24/22  1149 01/24/22  0614 01/24/22  0230 01/23/22  2119 01/23/22  1656 01/23/22  1136 01/23/22  0629 01/22/22  2107 01/22/22  1701 01/22/22  1140 01/22/22  0826   POC GLUCOSE mg/dl 92 60* 74 102 73 75 241* 179* 178* 217* 419* 354*         Results from last 7 days   Lab Units 01/24/22  0737 01/24/22  0434 01/22/22  1023 01/21/22  0444 01/21/22  0126   LACTIC ACID mmol/L  --   --   --  1 7 2 3*   PROCALCITONIN ng/ml 9 39* 6 86* 17 47*  --  11 61*       Lines/Drains:  Invasive Devices  Report    Peripheral Intravenous Line            Peripheral IV 12/15/21 Dorsal (posterior); Right Forearm 40 days    Peripheral IV 01/22/22 Distal;Right;Ventral (anterior) Wrist 2 days                Imaging: No pertinent imaging reviewed      Recent Cultures (last 7 days):   Results from last 7 days   Lab Units 01/21/22 0227 01/21/22 0226   BLOOD CULTURE   --  Staphylococcus coagulase negative*  Staphylococcus coagulase negative*  Globicatella sanguinis*  Klebsiella pneumoniae*   GRAM STAIN RESULT   --  Gram positive cocci in clusters*  Gram positive cocci in chains*  Gram negative rods*   URINE CULTURE >100,000 cfu/ml Klebsiella pneumoniae*  --        Last 24 Hours Medication List:   Current Facility-Administered Medications   Medication Dose Route Frequency Provider Last Rate    cefazolin  1,000 mg Intravenous Q12H Jacqueline Aaron MD 1,000 mg (01/24/22 0526)    cyclobenzaprine  10 mg Oral TID Alejandro Monroy DO      heparin (porcine)  5,000 Units Subcutaneous [facility] Alejandro Monroy, DO      insulin glargine  50 Units Subcutaneous HS YOLANDE Shaikh      insulin lispro  1-6 Units Subcutaneous HS Alejandro Monroy DO      insulin lispro  10 Units Subcutaneous TID With Meals YOLANDE Palomares      insulin lispro  2-12 Units Subcutaneous TID AC Alejandro Monroy DO      metoprolol tartrate  50 mg Oral BID Radha Henriquez PA-C      multi-electrolyte  75 mL/hr Intravenous Continuous Jeniffer Brown MD 75 mL/hr (01/24/22 0137)    oxyCODONE-acetaminophen  1 5 tablet Oral Q4H PRN Alejandro Monroy DO          Today, Patient Was Seen By: YOLANDE Palomares    **Please Note: This note may have been constructed using a voice recognition system  **

## 2022-01-24 NOTE — ASSESSMENT & PLAN NOTE
Recent, December 2021  · Seen by ortho, was WBAT LLE CAM boot  · Recommended outpatient f/u 4 weeks which he has not done  Has not been wearing boot  · Spoke with ortho on 1/21 recommended to check xray  ? Xray shows healing fracture with stable alignment  ?  Will recommend continued WBAT and outpatient ortho f/u

## 2022-01-24 NOTE — ASSESSMENT & PLAN NOTE
Wt Readings from Last 3 Encounters:   01/24/22 109 kg (240 lb)   12/16/21 104 kg (229 lb 11 5 oz)   07/25/21 111 kg (244 lb 12 8 oz)     Recent admission with acute on chronic heart failure  Appears fairly euvolemic at this time despite imaging showing loculated pleural effusion on admission     · Continue to hold lasix for now, on Plasma-Lyte per renal given MARANDA  · Monitor daily weights, I/O, volume status  · Low-sodium, fluid-restricted diet; patient admitted to some recent dietary indiscretion thus counseling provided

## 2022-01-24 NOTE — PLAN OF CARE
Problem: MOBILITY - ADULT  Goal: Maintain or return to baseline ADL function  Description: INTERVENTIONS:  -  Assess patient's ability to carry out ADLs; assess patient's baseline for ADL function and identify physical deficits which impact ability to perform ADLs (bathing, care of mouth/teeth, toileting, grooming, dressing, etc )  - Assess/evaluate cause of self-care deficits   - Assess range of motion  - Assess patient's mobility; develop plan if impaired  - Assess patient's need for assistive devices and provide as appropriate  - Encourage maximum independence but intervene and supervise when necessary  - Involve family in performance of ADLs  - Assess for home care needs following discharge   - Consider OT consult to assist with ADL evaluation and planning for discharge  - Provide patient education as appropriate  Outcome: Progressing  Goal: Maintains/Returns to pre admission functional level  Description: INTERVENTIONS:  - Perform BMAT or MOVE assessment daily    - Set and communicate daily mobility goal to care team and patient/family/caregiver     - Collaborate with rehabilitation services on mobility goals if consulted  - Perform Range  - Reposition pat  - Dangle p  - Stand patien  - Ambulate  - Out of bed t  - Out of b  - Out of bed for toil  - Record patient progress and toleration of activity level   Outcome: Progressing     Problem: PAIN - ADULT  Goal: Verbalizes/displays adequate comfort level or baseline comfort level  Description: Interventions:  - Encourage patient to monitor pain and request assistance  - Assess pain using appropriate pain scale  - Administer analgesics based on type and severity of pain and evaluate response  - Implement non-pharmacological measures as appropriate and evaluate response  - Consider cultural and social influences on pain and pain management  - Notify physician/advanced practitioner if interventions unsuccessful or patient reports new pain  Outcome: Progressing Problem: INFECTION - ADULT  Goal: Absence or prevention of progression during hospitalization  Description: INTERVENTIONS:  - Assess and monitor for signs and symptoms of infection  - Monitor lab/diagnostic results  - Monitor all insertion sites, i e  indwelling lines, tubes, and drains  - Monitor endotracheal if appropriate and nasal secretions for changes in amount and color  - Carrier appropriate cooling/warming therapies per order  - Administer medications as ordered  - Instruct and encourage patient and family to use good hand hygiene technique  - Identify and instruct in appropriate isolation precautions for identified infection/condition  Outcome: Progressing  Goal: Absence of fever/infection during neutropenic period  Description: INTERVENTIONS:  - Monitor WBC    Outcome: Progressing     Problem: SAFETY ADULT  Goal: Maintain or return to baseline ADL function  Description: INTERVENTIONS:  -  Assess patient's ability to carry out ADLs; assess patient's baseline for ADL function and identify physical deficits which impact ability to perform ADLs (bathing, care of mouth/teeth, toileting, grooming, dressing, etc )  - Assess/evaluate cause of self-care deficits   - Assess range of motion  - Assess patient's mobility; develop plan if impaired  - Assess patient's need for assistive devices and provide as appropriate  - Encourage maximum independence but intervene and supervise when necessary  - Involve family in performance of ADLs  - Assess for home care needs following discharge   - Consider OT consult to assist with ADL evaluation and planning for discharge  - Provide patient education as appropriate  Outcome: Progressing  Goal: Maintains/Returns to pre admission functional level  Description: INTERVENTIONS:  - Perform BMAT or MOVE assessment daily    - Set and communicate daily mobility goal to care team and patient/family/caregiver     - Collaborate with rehabilitation services on mobility goals if consulted  - Perform Range   - Reposition patient e  - Dangle patient   - Stand patient  - Ambulate pat  - Out of be  - Out of bed for toileting  - Record patient progress and toleration of activity level   Outcome: Progressing  Goal: Patient will remain free of falls  Description: INTERVENTIONS:  - Educate patient/family on patient safety including physical limitations  - Instruct patient to call for assistance with activity   - Consult OT/PT to assist with strengthening/mobility   - Keep Call bell within reach  - Keep bed low and locked with side rails adjusted as appropriate  - Keep care items and personal belongings within reach  - Initiate and maintain comfort rounds  - Make Fall Risk Sign visible to staff  - Offer Toileting e of need  - Initiate/M  - Obtain necessary fall risk management   - Apply yellow socks and bracelet for high fall risk patients  - Consider moving patient to room near nurses station  Outcome: Progressing     Problem: DISCHARGE PLANNING  Goal: Discharge to home or other facility with appropriate resources  Description: INTERVENTIONS:  - Identify barriers to discharge w/patient and caregiver  - Arrange for needed discharge resources and transportation as appropriate  - Identify discharge learning needs (meds, wound care, etc )  - Arrange for interpretive services to assist at discharge as needed  - Refer to Case Management Department for coordinating discharge planning if the patient needs post-hospital services based on physician/advanced practitioner order or complex needs related to functional status, cognitive ability, or social support system  Outcome: Progressing     Problem: Knowledge Deficit  Goal: Patient/family/caregiver demonstrates understanding of disease process, treatment plan, medications, and discharge instructions  Description: Complete learning assessment and assess knowledge base    Interventions:  - Provide teaching at level of understanding  - Provide teaching via preferred learning methods  Outcome: Progressing     Problem: Potential for Falls  Goal: Patient will remain free of falls  Description: INTERVENTIONS:  - Educate patient/family on patient safety including physical limitations  - Instruct patient to call for assistance with activity   - Consult OT/PT to assist with strengthening/mobility   - Keep Call bell within reach  - Keep bed low and locked with side rails adjusted as appropriate  - Keep care items and personal belongings within reach  - Initiate and maintain comfort rounds  - Make Fall Risk Sign visible to staff  - Offer Toileting everyance of need  - Initiate/Maint  - Obtain necessary fall risk management   - Apply yellow socks and bracelet for high fall risk patients  - Consider moving patient to room near nurses station  Outcome: Progressing

## 2022-01-24 NOTE — PROGRESS NOTES
Progress Note - Infectious Disease   Elana Osborne Sr  78 y o  male MRN: [id number]  Unit/Bed#: [facility] 603-01 Encounter: [id number]      Impression:  1  Klebsiella pneumoniae urosepsis with MARANDA on CKD  R/0    Gram-positive cocci contaminant  2  COPD with Chronic respiratory failure with hypoxia  3  Essential hypertension  4  Chronic diastolic CHF   5  Type 2 DM  6  History of prostate carcinoma     Recommendations:   Patient is afebrile with WBC count 17,370  1  One blood and urine culture showing Klebsiella pneumoniae that is cefazolin susceptible  The other blood culture showing a coagulase-negative Staphylococcus which is a contaminant  2  Cefepime was switched to cefazolin 1 g q 12 hours IV  If patient remains stable should be able to switch to p o  Cephalexin shortly    Antibiotics:   1  Cefazolin 1 g q 12 hours IV, day 5 total antibiotic Rx    Subjective:  Has pain in both legs and back  Denies fevers, chills, or sweats  Denies nausea, vomiting, or diarrhea  Objective:  Vitals:  Temp:  [97 4 °F (36 3 °C)-98 9 °F (37 2 °C)] 98 9 °F (37 2 °C)  Resp:  [17-18] 18  BP: (105-139)/(52-75) 122/75  SpO2:  [92 %] 92 %  Temp (24hrs), Av 4 °F (36 9 °C), Min:97 4 °F (36 3 °C), Max:98 9 °F (37 2 °C)  Current: Temperature: 98 9 °F (37 2 °C)    Physical Exam:     General Appearance:  Alert, chronically ill-appearing nontoxic, no acute distress  Throat: Oropharynx moist without lesions  Lips, mucosa, and tongue normal   Neck: Supple, symmetrical, trachea midline, no adenopathy,  no tenderness/mass/nodules   Lungs:   Hyper resonant few diffuse rhonchi, decreased respiratory expansion    Heart:  Regular rate and rhythm, S1, S2 normal, no murmur, rub or gallop   Abdomen:   Soft, non-tender, markedly protuberant non-distended, positive bowel sounds  No masses, no organomegaly    No CVA tenderness   Extremities: Lower extremity stasis changes   Skin: As above           Invasive Devices  Report    Peripheral Intravenous Line            Peripheral IV 12/15/21 Dorsal (posterior); Right Forearm 39 days    Peripheral IV 01/22/22 Distal;Right;Ventral (anterior) Wrist 1 day                Labs, Imaging, & Other studies:   All pertinent labs were personally reviewed  Results from last 7 days   Lab Units 01/23/22  0646 01/22/22 0457 01/21/22  1117   WBC Thousand/uL 17 37* 19 98* 20 27*   HEMOGLOBIN g/dL 10 4* 11 3* 11 5*   PLATELETS Thousands/uL 181 180 178     Results from last 7 days   Lab Units 01/23/22  0646 01/22/22  1945 01/22/22  1945 01/22/22 0457 01/22/22  0457 01/21/22  1117 01/21/22  0018   SODIUM mmol/L 134*  --  134*  --  129*   < > 130*   POTASSIUM mmol/L 3 7   < > 3 6   < > 3 7   < > 3 8   CHLORIDE mmol/L 102   < > 101   < > 96*   < > 93*   CO2 mmol/L 24   < > 27   < > 23   < > 27   BUN mg/dL 66*   < > 64*   < > 55*   < > 32*   CREATININE mg/dL 2 68*   < > 2 80*   < > 2 76*   < > 2 10*   EGFR ml/min/1 73sq m 22   < > 21   < > 21   < > 30   CALCIUM mg/dL 8 4   < > 9 7   < > 8 2*   < > 9 7   AST U/L  --   --   --   --   --   --  24   ALT U/L  --   --   --   --   --   --  13   ALK PHOS U/L  --   --   --   --   --   --  139*    < > = values in this interval not displayed       Results from last 7 days   Lab Units 01/21/22 0227 01/21/22 0226   BLOOD CULTURE   --  Staphylococcus coagulase negative*  Klebsiella pneumoniae*   GRAM STAIN RESULT   --  Gram positive cocci in clusters*  Gram positive cocci in chains*  Gram negative rods*   URINE CULTURE  >100,000 cfu/ml Klebsiella pneumoniae*  --

## 2022-01-24 NOTE — ASSESSMENT & PLAN NOTE
Lab Results   Component Value Date    HGBA1C 9 2 (H) 04/14/2021       Recent Labs     01/25/22  0742 01/25/22  0846 01/25/22  0857 01/25/22  1113   POCGLU 59* 141* 153* 169*       Blood Sugar Average: Last 72 hrs:  · (P) 162 65   · A1C poorly controlled, with hyperglycemia on admission  · Home regimen: U500 45 units before breakfast and lunch  · Was switched to basal/bolus on admission, continue to titrate PRN  · SSI with Accu-Cheks, carb controlled diet  · Hypoglycemia protocol  · Persistent hypoglycemia: insulin adjustment as follows, basal dose adjusted to 25 units QHS, mealtime bolus dose adjusted to 5 units

## 2022-01-24 NOTE — PROGRESS NOTES
Progress Note - Nephrology   Shira Manuel  78 y o  male MRN: [id number]  Unit/Bed#: [facility] 603-01 Encounter: [id number]      Assessment / Plan:    72-year-old man with a history of stage III CKD with baseline creatinine of 1 4-1 6, hypertension, diabetes mellitus, COPD, CHF on chronic home oxygen  Admitted with a UTI  MARANDA and CKD stage 3  · Baseline creatinine of 1 4-1 6  · MARANDA secondary to urosepsis, bacteremia culminating in ATN  · Creatinine has improved and currently is 2 12  · Renal ultrasound without hydronephrosis on the left, right renal pelvis with prominence without any overt hydronephrosis  · Avoid nephrotoxins and relative hypotension  CKD  · Secondary to diabetes mellitus and hypertension  · Follows with Dr Wicho Plaza as an outpatient  Hyponatremia  · Resolved with hypotonic fluid administration    Other issues:  · Klebsiella bacteremia  · COPD  · Chronic diastolic CHF  · Type 2 diabetes mellitus  · Prostate carcinoma  · H/o Lung cancer  · Chronic respiratory failure with hypoxia and chronic home oxygen  · Anemia-hemoglobin has decreased from 12 2 to 9 9-increased RDW in decreased MCV  Further workup per primary team    Plan:  · Decrease intravenous fluids and Hep-Lock in a m  if creatinine continues to improve  · Recheck BMP in a m  · Trend PVR if needed based on symptoms, last PVR was 104 mL on   · Monitor GFR and re-dose antibiotics as needed    Subjective: The patient was seen examined early this morning  He denied any shortness of breath, chest pain or pressure, abdominal pain, vomiting, diarrhea, sweats, chills, paroxysmal nocturnal dyspnea orthopnea  He did state that he had difficulty passing urine before he came in but this has improved somewhat  Objective:     Vitals: Blood pressure 123/66, pulse 92, temperature 97 5 °F (36 4 °C), resp  rate 19, height 6' (1 829 m), weight 109 kg (240 lb), SpO2 99 %  ,Body mass index is 32 55 kg/m²  Temp (24hrs), Av 1 °F (36 7 °C), Min:97 2 °F (36 2 °C), Max:99 6 °F (37 6 °C)      Weight (last 2 days)     Date/Time Weight    01/24/22 0553 109 (240)    01/23/22 2036 110 (242)                 Intake/Output Summary (Last 24 hours) at 1/24/2022 1531  Last data filed at 1/24/2022 1255  Gross per 24 hour   Intake 2076 25 ml   Output 1600 ml   Net 476 25 ml     I/O last 24 hours: In: 2556 3 [P O :1560; I V :996 3]  Out: 1800 [Urine:1800]        Physical Exam    /66   Pulse 92   Temp 97 5 °F (36 4 °C)   Resp 19   Ht 6' (1 829 m)   Wt 109 kg (240 lb)   SpO2 99%   BMI 32 55 kg/m²   Vital signs were reviewed  Constitutional:  The patient was awake, alert, pleasant, cooperative in no apparent distress  Cardiovascular:  No overt jugular venous distention was noted, S1-S2, no pericardial friction rub S3 appreciated, chronic brawny skin changes with edema noted in the lower extremities  Pulmonary:  Adequate story effort, lungs were clear as tension with question with no rales/rhonchi or wheezing noted  Abdominal/GI:  Soft, nontender, no rebound or guarding was noted  Skin:  No hives noted            Invasive Devices  Report    Peripheral Intravenous Line            Peripheral IV 12/15/21 Dorsal (posterior); Right Forearm 40 days    Peripheral IV 01/22/22 Distal;Right;Ventral (anterior) Wrist 2 days                Medications:    Scheduled Meds:  Current Facility-Administered Medications   Medication Dose Route Frequency Provider Last Rate    cefazolin  1,000 mg Intravenous Q12H Caterina Padilla MD 1,000 mg (01/24/22 0526)    cyclobenzaprine  10 mg Oral TID Marycruz Davis DO      heparin (porcine)  5,000 Units Subcutaneous [facility] Marycruz Davis DO      insulin glargine  50 Units Subcutaneous HS YOLANDE Shaikh      insulin lispro  1-6 Units Subcutaneous HS Marycruz Davis DO      insulin lispro  10 Units Subcutaneous TID With Meals YOLANDE Lau      insulin lispro  2-12 Units Subcutaneous TID AC Marycruz Davis DO  metoprolol tartrate  50 mg Oral BID Darryle Rod, PA-C      multi-electrolyte  75 mL/hr Intravenous Continuous Falguni David MD 75 mL/hr (01/24/22 0137)    oxyCODONE-acetaminophen  1 5 tablet Oral Q4H PRN Jaden Kemp DO         PRN Meds: oxyCODONE-acetaminophen    Continuous Infusions:multi-electrolyte, 75 mL/hr, Last Rate: 75 mL/hr (01/24/22 0137)            LAB RESULTS:      Results from last 7 days   Lab Units 01/24/22  0434 01/23/22  0646 01/22/22  1945 01/22/22  0457 01/21/22  1908 01/21/22  1117 01/21/22  0018   WBC Thousand/uL 11 86* 17 37*  --  19 98*  --  20 27* 23 62*   HEMOGLOBIN g/dL 9 9* 10 4*  --  11 3*  --  11 5* 12 2   HEMATOCRIT % 31 2* 33 1*  --  35 1*  --  35 6* 37 7   PLATELETS Thousands/uL 195 181  --  180  --  178 155   NEUTROS PCT % 85* 85*  --   --   --  88*  --    LYMPHS PCT % 5* 4*  --   --   --  3*  --    LYMPHO PCT %  --   --   --   --   --   --  2*   MONOS PCT % 8 9  --   --   --  8  --    MONO PCT %  --   --   --   --   --   --  8   EOS PCT % 1 1  --   --   --  0 0   POTASSIUM mmol/L 3 6 3 7 3 6 3 7 3 9 4 0 3 8   CHLORIDE mmol/L 104 102 101 96* 95* 94* 93*   CO2 mmol/L 26 24 27 23 23 28 27   BUN mg/dL 60* 66* 64* 55* 51* 43* 32*   CREATININE mg/dL 2 12* 2 68* 2 80* 2 76* 2 76* 2 70* 2 10*   CALCIUM mg/dL 8 0* 8 4 9 7 8 2* 8 7 8 9 9 7   ALK PHOS U/L  --   --   --   --   --   --  139*   ALT U/L  --   --   --   --   --   --  13   AST U/L  --   --   --   --   --   --  24       CUTURES:  Lab Results   Component Value Date    BLOODCX Klebsiella pneumoniae (A) 01/21/2022    BLOODCX Staphylococcus coagulase negative (A) 01/21/2022    BLOODCX Staphylococcus coagulase negative (A) 01/21/2022    BLOODCX Globicatella sanguinis (A) 01/21/2022    BLOODCX No Growth After 5 Days  06/20/2018    BLOODCX No Growth After 5 Days  06/20/2018    BLOODCX No Growth After 5 Days   06/19/2018    URINECX >100,000 cfu/ml Klebsiella pneumoniae (A) 01/21/2022    URINECX >100,000 cfu/ml  09/11/2018 URINECX >100,000 cfu/ml Staphylococcus coagulase negative 12/03/2016                 PLEASE NOTE:  This encounter was completed utilizing the [employer] Direct Speech Voice Recognition Software  Grammatical errors, random word insertions, pronoun errors and incomplete sentences are occasional consequences of the system due to software limitations, ambient noise and hardware issues  These may be missed by proof reading prior to affixing electronic signature  Any questions or concerns about the content, text or information contained within the body of this dictation should be directly addressed to the physician for clarification  Please do not hesitate to call me directly if you have any any questions or concerns

## 2022-01-24 NOTE — ASSESSMENT & PLAN NOTE
Lab Results   Component Value Date    HGBA1C 9 2 (H) 04/14/2021       Recent Labs     01/23/22  1656 01/23/22  2119 01/24/22  0230 01/24/22  0614   POCGLU 75 73 102 74       Blood Sugar Average: Last 72 hrs:  (P) 230 [id number]   · A1C poorly controlled, with hyperglycemia on admission  · Home regimen: U500 45 units before breakfast and lunch  · Was switched to basal/bolus on admission, continue to titrate PRN  · SSI with Accu-Cheks, carb controlled diet  · Hypoglycemia protocol

## 2022-01-24 NOTE — ASSESSMENT & PLAN NOTE
Suspected, tachycardia/tachypnea/leukocytosis on presentation  Patient was complaining of urinary urgency thus suspected source is UTI and UA with pyuria/bacteriuria  Additionally with lactic acidosis resolved s/p IV fluids  · Patient hemodynamically stable and nontoxic appearing at this time  · Urine culture showing Klebsiella pneumoniae  · Blood cultures 1/2 showing Klebsiella, other showing gram positive cocci in clusters and gram positive cocci in chains which is likely contaminant   · Trend WBC, temperature curve, hemodynamics - Most recent WBC 11 86 1/24, trending down   · Procal elevated at 17, trend - most recent procal 9 on 1/24  · Downgraded to Cefazolin per ID, should be able to switch to p o  Cephalexin shortly if remains stable   · Regarding acute renal failure baseline creatinine appears 1 4 and he presented with creat of 2 10  ?  See further plan below under MARANDA

## 2022-01-24 NOTE — ASSESSMENT & PLAN NOTE
Suspect in setting of hyperglycemia, dehydration   Suspect some element of chronicity   · Plasma-Lyte as above  · Monitor BMP

## 2022-01-24 NOTE — PROGRESS NOTES
Pt blood sugar before lunch was 60  Pt not symptomatic He chewed one glucose tablet before lunch came then ate 75 percent lunch with one cup orange juice  I held llunch time insulin SLIM practioner aware see updated orders

## 2022-01-25 LAB
ANION GAP SERPL CALCULATED.3IONS-SCNC: 7 MMOL/L (ref 4–13)
BASOPHILS # BLD AUTO: 0.02 THOUSANDS/ΜL (ref 0–0.1)
BASOPHILS NFR BLD AUTO: 0 % (ref 0–1)
BUN SERPL-MCNC: 46 MG/DL (ref 5–25)
CALCIUM SERPL-MCNC: 8.1 MG/DL (ref 8.3–10.1)
CHLORIDE SERPL-SCNC: 106 MMOL/L (ref 100–108)
CO2 SERPL-SCNC: 26 MMOL/L (ref 21–32)
CREAT SERPL-MCNC: 1.69 MG/DL (ref 0.6–1.3)
EOSINOPHIL # BLD AUTO: 0.13 THOUSAND/ΜL (ref 0–0.61)
EOSINOPHIL NFR BLD AUTO: 1 % (ref 0–6)
ERYTHROCYTE [DISTWIDTH] IN BLOOD BY AUTOMATED COUNT: 15.5 % (ref 11.6–15.1)
FERRITIN SERPL-MCNC: 157 NG/ML (ref 8–388)
FOLATE SERPL-MCNC: 12.2 NG/ML (ref 3.1–17.5)
GFR SERPL CREATININE-BSD FRML MDRD: 39 ML/MIN/1.73SQ M
GLUCOSE SERPL-MCNC: 141 MG/DL (ref 65–140)
GLUCOSE SERPL-MCNC: 153 MG/DL (ref 65–140)
GLUCOSE SERPL-MCNC: 161 MG/DL (ref 65–140)
GLUCOSE SERPL-MCNC: 169 MG/DL (ref 65–140)
GLUCOSE SERPL-MCNC: 176 MG/DL (ref 65–140)
GLUCOSE SERPL-MCNC: 53 MG/DL (ref 65–140)
GLUCOSE SERPL-MCNC: 55 MG/DL (ref 65–140)
GLUCOSE SERPL-MCNC: 59 MG/DL (ref 65–140)
HCT VFR BLD AUTO: 30.9 % (ref 36.5–49.3)
HGB BLD-MCNC: 9.8 G/DL (ref 12–17)
IMM GRANULOCYTES # BLD AUTO: 0.13 THOUSAND/UL (ref 0–0.2)
IMM GRANULOCYTES NFR BLD AUTO: 1 % (ref 0–2)
IRON SATN MFR SERPL: 9 % (ref 20–50)
IRON SERPL-MCNC: 20 UG/DL (ref 65–175)
LYMPHOCYTES # BLD AUTO: 0.62 THOUSANDS/ΜL (ref 0.6–4.47)
LYMPHOCYTES NFR BLD AUTO: 6 % (ref 14–44)
MAGNESIUM SERPL-MCNC: 2.5 MG/DL (ref 1.6–2.6)
MCH RBC QN AUTO: 25.9 PG (ref 26.8–34.3)
MCHC RBC AUTO-ENTMCNC: 31.7 G/DL (ref 31.4–37.4)
MCV RBC AUTO: 82 FL (ref 82–98)
MONOCYTES # BLD AUTO: 0.98 THOUSAND/ΜL (ref 0.17–1.22)
MONOCYTES NFR BLD AUTO: 9 % (ref 4–12)
NEUTROPHILS # BLD AUTO: 9.37 THOUSANDS/ΜL (ref 1.85–7.62)
NEUTS SEG NFR BLD AUTO: 83 % (ref 43–75)
NRBC BLD AUTO-RTO: 0 /100 WBCS
PHOSPHATE SERPL-MCNC: 3.1 MG/DL (ref 2.3–4.1)
PLATELET # BLD AUTO: 272 THOUSANDS/UL (ref 149–390)
PMV BLD AUTO: 9.8 FL (ref 8.9–12.7)
POTASSIUM SERPL-SCNC: 3.4 MMOL/L (ref 3.5–5.3)
RBC # BLD AUTO: 3.79 MILLION/UL (ref 3.88–5.62)
SODIUM SERPL-SCNC: 139 MMOL/L (ref 136–145)
TIBC SERPL-MCNC: 213 UG/DL (ref 250–450)
VIT B12 SERPL-MCNC: 362 PG/ML (ref 100–900)
WBC # BLD AUTO: 11.25 THOUSAND/UL (ref 4.31–10.16)

## 2022-01-25 PROCEDURE — 99232 SBSQ HOSP IP/OBS MODERATE 35: CPT | Performed by: INTERNAL MEDICINE

## 2022-01-25 PROCEDURE — 99231 SBSQ HOSP IP/OBS SF/LOW 25: CPT | Performed by: INTERNAL MEDICINE

## 2022-01-25 PROCEDURE — 83735 ASSAY OF MAGNESIUM: CPT | Performed by: INTERNAL MEDICINE

## 2022-01-25 PROCEDURE — 85025 COMPLETE CBC W/AUTO DIFF WBC: CPT | Performed by: NURSE PRACTITIONER

## 2022-01-25 PROCEDURE — 84100 ASSAY OF PHOSPHORUS: CPT | Performed by: INTERNAL MEDICINE

## 2022-01-25 PROCEDURE — 82746 ASSAY OF FOLIC ACID SERUM: CPT | Performed by: NURSE PRACTITIONER

## 2022-01-25 PROCEDURE — 82728 ASSAY OF FERRITIN: CPT | Performed by: NURSE PRACTITIONER

## 2022-01-25 PROCEDURE — 83540 ASSAY OF IRON: CPT | Performed by: NURSE PRACTITIONER

## 2022-01-25 PROCEDURE — 82948 REAGENT STRIP/BLOOD GLUCOSE: CPT

## 2022-01-25 PROCEDURE — 80048 BASIC METABOLIC PNL TOTAL CA: CPT | Performed by: INTERNAL MEDICINE

## 2022-01-25 PROCEDURE — 83550 IRON BINDING TEST: CPT | Performed by: NURSE PRACTITIONER

## 2022-01-25 PROCEDURE — 82607 VITAMIN B-12: CPT | Performed by: NURSE PRACTITIONER

## 2022-01-25 RX ORDER — POTASSIUM CHLORIDE 20 MEQ/1
40 TABLET, EXTENDED RELEASE ORAL ONCE
Status: COMPLETED | OUTPATIENT
Start: 2022-01-25 | End: 2022-01-25

## 2022-01-25 RX ORDER — CEPHALEXIN 500 MG/1
500 CAPSULE ORAL 4 TIMES DAILY
Status: DISCONTINUED | OUTPATIENT
Start: 2022-01-26 | End: 2022-01-30 | Stop reason: HOSPADM

## 2022-01-25 RX ORDER — TORSEMIDE 20 MG/1
20 TABLET ORAL DAILY
Status: DISCONTINUED | OUTPATIENT
Start: 2022-01-25 | End: 2022-01-26

## 2022-01-25 RX ADMIN — INSULIN GLARGINE 50 UNITS: 100 INJECTION, SOLUTION SUBCUTANEOUS at 21:44

## 2022-01-25 RX ADMIN — POTASSIUM CHLORIDE 40 MEQ: 1500 TABLET, EXTENDED RELEASE ORAL at 14:44

## 2022-01-25 RX ADMIN — HEPARIN SODIUM 5000 UNITS: 5000 INJECTION INTRAVENOUS; SUBCUTANEOUS at 21:44

## 2022-01-25 RX ADMIN — CYCLOBENZAPRINE HYDROCHLORIDE 10 MG: 10 TABLET, FILM COATED ORAL at 21:44

## 2022-01-25 RX ADMIN — TORSEMIDE 20 MG: 20 TABLET ORAL at 14:44

## 2022-01-25 RX ADMIN — OXYCODONE HYDROCHLORIDE AND ACETAMINOPHEN 1.5 TABLET: 5; 325 TABLET ORAL at 07:56

## 2022-01-25 RX ADMIN — SODIUM CHLORIDE, SODIUM GLUCONATE, SODIUM ACETATE, POTASSIUM CHLORIDE, MAGNESIUM CHLORIDE, SODIUM PHOSPHATE, DIBASIC, AND POTASSIUM PHOSPHATE 50 ML/HR: .53; .5; .37; .037; .03; .012; .00082 INJECTION, SOLUTION INTRAVENOUS at 13:35

## 2022-01-25 RX ADMIN — CEFAZOLIN SODIUM 1000 MG: 1 SOLUTION INTRAVENOUS at 17:55

## 2022-01-25 RX ADMIN — Medication 4 G: at 07:53

## 2022-01-25 RX ADMIN — INSULIN LISPRO 2 UNITS: 100 INJECTION, SOLUTION INTRAVENOUS; SUBCUTANEOUS at 12:26

## 2022-01-25 RX ADMIN — INSULIN LISPRO 1 UNITS: 100 INJECTION, SOLUTION INTRAVENOUS; SUBCUTANEOUS at 21:46

## 2022-01-25 RX ADMIN — CYCLOBENZAPRINE HYDROCHLORIDE 10 MG: 10 TABLET, FILM COATED ORAL at 08:00

## 2022-01-25 RX ADMIN — HEPARIN SODIUM 5000 UNITS: 5000 INJECTION INTRAVENOUS; SUBCUTANEOUS at 05:42

## 2022-01-25 RX ADMIN — HEPARIN SODIUM 5000 UNITS: 5000 INJECTION INTRAVENOUS; SUBCUTANEOUS at 13:33

## 2022-01-25 RX ADMIN — METOPROLOL TARTRATE 50 MG: 50 TABLET, FILM COATED ORAL at 08:01

## 2022-01-25 RX ADMIN — METOPROLOL TARTRATE 50 MG: 50 TABLET, FILM COATED ORAL at 17:55

## 2022-01-25 RX ADMIN — CYCLOBENZAPRINE HYDROCHLORIDE 10 MG: 10 TABLET, FILM COATED ORAL at 17:54

## 2022-01-25 RX ADMIN — INSULIN LISPRO 2 UNITS: 100 INJECTION, SOLUTION INTRAVENOUS; SUBCUTANEOUS at 18:00

## 2022-01-25 RX ADMIN — CEFAZOLIN SODIUM 1000 MG: 1 SOLUTION INTRAVENOUS at 05:42

## 2022-01-25 NOTE — ASSESSMENT & PLAN NOTE
Lab Results   Component Value Date    EGFR 39 01/25/2022    EGFR 29 01/24/2022    EGFR 22 01/23/2022    CREATININE 1 69 (H) 01/25/2022    CREATININE 2 12 (H) 01/24/2022    CREATININE 2 68 (H) 01/23/2022   POA creatinine 2 10, baseline around 1 2-1 4  · Etiology suspected to be from sepsis/dehdyration  S/P IVF on admission  · Continue to ensure no retention by checking bladder scan, PVRs--has not been done despite orders for several days  · Avoid hypotension  · Holding lasix  · Renal U/S without hydro  · Nephro changed IVF to IV Plasma-Lyte at 75 mL/hour

## 2022-01-25 NOTE — ASSESSMENT & PLAN NOTE
Suspected, tachycardia/tachypnea/leukocytosis on presentation  Yumiko Mendez was complaining of urinary urgency thus suspected source is UTI and UA with pyuria/bacteriuria   Additionally with lactic acidosis resolved s/p IV fluids  · Patient hemodynamically stable and nontoxic appearing at this time  · Urine culture showing Klebsiella pneumoniae  · Blood cultures 1/2 showing Klebsiella, other showing gram positive cocci in clusters and gram positive cocci in chains which is likely contaminant   · Trend WBC, temperature curve, hemodynamics - Most recent WBC 11 25 1/25, trending down   · Procal elevated at 17, trend - most recent procal 9 on 1/24  · Downgraded to Cefazolin per ID, Discussed with Dr Herminio Hancock ID can transition to p o Keflex to complete 14 day abx course upon DC   · Regarding acute renal failure baseline creatinine appears 1 4 and he presented with creat of 2 10  ?  See further plan below under MARANDA

## 2022-01-25 NOTE — PROGRESS NOTES
[address] [facility]  Progress Note - Miryam Mars Sr  1945, 78 y o  male MRN: [id number]  [facility] Encounter: [id number]  Primary Care Provider: Alonzo Severs, MD   Date and time admitted to hospital: 1/20/2022 11:50 PM    * Sepsis with acute renal failure without septic shock [facility])  Assessment & Plan  Suspected, tachycardia/tachypnea/leukocytosis on presentation  Suzie Phillips was complaining of urinary urgency thus suspected source is UTI and UA with pyuria/bacteriuria   Additionally with lactic acidosis resolved s/p IV fluids  · Patient hemodynamically stable and nontoxic appearing at this time  · Urine culture showing Klebsiella pneumoniae  · Blood cultures 1/2 showing Klebsiella, other showing gram positive cocci in clusters and gram positive cocci in chains which is likely contaminant   · Trend WBC, temperature curve, hemodynamics - Most recent WBC 11 25 1/25, trending down   · Procal elevated at 17, trend - most recent procal 9 on 1/24  · Downgraded to Cefazolin per ID, Discussed with Dr Cuca Desouza ID can transition to p o Keflex to complete 14 day abx course upon DC   · Regarding acute renal failure baseline creatinine appears 1 4 and he presented with creat of 2 10  ? See further plan below under MARANDA      Bacteremia  Assessment & Plan  1 out of 2 blood cultures from admission with Klebsiella and other with gram positive cocci in clusters and chains  · IV abx as above for one, other suspected contaminant   · ID following    Acute kidney injury superimposed on chronic kidney disease [facility])  Assessment & Plan  Lab Results   Component Value Date    EGFR 39 01/25/2022    EGFR 29 01/24/2022    EGFR 22 01/23/2022    CREATININE 1 69 (H) 01/25/2022    CREATININE 2 12 (H) 01/24/2022    CREATININE 2 68 (H) 01/23/2022   POA creatinine 2 10, baseline around 1 2-1 4  · Etiology suspected to be from sepsis/dehdyration   S/P IVF on admission  · Continue to ensure no retention by checking bladder scan, PVRs--has not been done despite orders for several days  · Avoid hypotension  · Holding lasix  · Renal U/S without hydro  · Nephro changed IVF to IV Plasma-Lyte at 75 mL/hour  Adenocarcinoma of lung [facility])  Assessment & Plan  Follows with radiation oncology as outpatient, most recent office note reviewed from 9/2021  · He had CTA study on July 23, 2021--revealed no evidence of any disease  · Recommended to have repeat CT chest January 2022 and f/u as outpatienet  · Continue to recommend outpatient f/u    Essential hypertension  Assessment & Plan  Continue lopressor however was increased to 50 mg BID given ongoing tachycardia despite sepsis treatment  · Hold lasix given MARANDA    Chronic diastolic heart failure (HCC)  Assessment & Plan  Wt Readings from Last 3 Encounters:   01/25/22 112 kg (246 lb 12 8 oz)   12/16/21 104 kg (229 lb 11 5 oz)   07/25/21 111 kg (244 lb 12 8 oz)     Recent admission with acute on chronic heart failure  Appears fairly euvolemic at this time despite imaging showing loculated pleural effusion on admission  · Continue to hold lasix for now, on Plasma-Lyte per renal given MARANDA  · Monitor daily weights, I/O, volume status  · Low-sodium, fluid-restricted diet; patient admitted to some recent dietary indiscretion thus counseling provided        Hyponatremia  Assessment & Plan  Suspect in setting of hyperglycemia, dehydration   Suspect some element of chronicity   · IVF as above  · Monitor BMP    Type 2 diabetes mellitus with hyperglycemia, with long-term current use of insulin [facility])  Assessment & Plan  Lab Results   Component Value Date    HGBA1C 9 2 (H) 04/14/2021       Recent Labs     01/25/22  0742 01/25/22  0846 01/25/22  0857 01/25/22  1113   POCGLU 59* 141* 153* 169*       Blood Sugar Average: Last 72 hrs:  · (P) 162 65   · A1C poorly controlled, with hyperglycemia on admission  · Home regimen: U500 45 units before breakfast and lunch  · Was switched to basal/bolus on admission, continue to titrate PRN  · SSI with Accu-Cheks, carb controlled diet  · Hypoglycemia protocol  · Persistent hypoglycemia: insulin adjustment as follows, basal dose adjusted to 25 units QHS, mealtime bolus dose adjusted to 5 units     Anemia  Assessment & Plan  · Hgb 11 3 , 9 9   · Has been on continuous fluids for MARANDA, could be dilutional component   · Will order repeat CBC, iron studies, B12 and heme stool   · Pt denies subjective hematuria, blood in stool       VTE Pharmacologic Prophylaxis: VTE Score: 5 High Risk (Score >/= 5) - Pharmacological DVT Prophylaxis Ordered: heparin  Sequential Compression Devices Ordered  Patient Centered Rounds: I performed bedside rounds with nursing staff today  Discussions with Specialists or Other Care Team Provider: case management and nursing    Education and Discussions with Family / Patient: Patient declined call to   Time Spent for Care: 30 minutes  More than 50% of total time spent on counseling and coordination of care as described above  Current Length of Stay: 4 day(s)  Current Patient Status: Inpatient   Certification Statement: The patient will continue to require additional inpatient hospital stay due to management and monitoring of ongoing sepsis, MARANDA tx  Discharge Plan: Anticipate discharge in 24-48 hrs to rehab facility  Code Status: Level 1 - Full Code    Subjective:   Patient reports feeling well today  He endorses no changes to appetite, N/V/D/C, fatigue, hematuria, or blood in stool  He endorses a history of "issues with prostate that have been dealt with" but otherwwise hes been feeling well  He is tolerating p o, ate approx half of his breakfast today which is standard for him  He eats lighter in the AM and a heavier dinner  He states he has always had issues with his diabetes and hasnt been able to gain control       Objective:     Vitals:   Temp (24hrs), Av 1 °F (36 7 °C), Min:97 5 °F (36 4 °C), Max:98 6 °F (37 °C)    Temp:  [97 5 °F (36 4 °C)-98 6 °F (37 °C)] 98 1 °F (36 7 °C)  HR:  [] 100  Resp:  [19-20] 20  BP: (123-146)/(62-66) 140/66  SpO2:  [90 %-92 %] 90 %  Body mass index is 33 47 kg/m²  Input and Output Summary (last 24 hours): Intake/Output Summary (Last 24 hours) at 1/25/2022 1216  Last data filed at 1/25/2022 0900  Gross per 24 hour   Intake 1947 5 ml   Output 2150 ml   Net -202 5 ml       Physical Exam:   Physical Exam  Vitals and nursing note reviewed  Constitutional:       Appearance: He is obese  Comments: disheveled appearance   Eyes:      Conjunctiva/sclera: Conjunctivae normal       Pupils: Pupils are equal, round, and reactive to light  Cardiovascular:      Rate and Rhythm: Normal rate and regular rhythm  Heart sounds: S1 normal and S2 normal    Pulmonary:      Effort: No respiratory distress  Breath sounds: Normal breath sounds  No wheezing or rales  Abdominal:      General: There is no distension  Tenderness: There is no abdominal tenderness  Musculoskeletal:      Right lower leg: No edema  Left lower leg: No edema  Skin:     Coloration: Skin is pale  Neurological:      Mental Status: He is oriented to person, place, and time  Psychiatric:      Comments: Slightly strange affect         Additional Data:     Labs:  Results from last 7 days   Lab Units 01/25/22  0928 01/21/22  1117 01/21/22  0018   WBC Thousand/uL 11 25*   < > 23 62*   HEMOGLOBIN g/dL 9 8*   < > 12 2   HEMATOCRIT % 30 9*   < > 37 7   PLATELETS Thousands/uL 272   < > 155   BANDS PCT %  --   --  17*   NEUTROS PCT % 83*   < >  --    LYMPHS PCT % 6*   < >  --    LYMPHO PCT %  --   --  2*   MONOS PCT % 9   < >  --    MONO PCT %  --   --  8   EOS PCT % 1   < > 0    < > = values in this interval not displayed       Results from last 7 days   Lab Units 01/25/22  0431 01/21/22  1117 01/21/22  0018   SODIUM mmol/L 139   < > 130*   POTASSIUM mmol/L 3 4*   < > 3 8   CHLORIDE mmol/L 106   < > 93*   CO2 mmol/L 26   < > 27   BUN mg/dL 46*   < > 32*   CREATININE mg/dL 1 69*   < > 2 10*   ANION GAP mmol/L 7   < > 10   CALCIUM mg/dL 8 1*   < > 9 7   ALBUMIN g/dL  --   --  2 5*   TOTAL BILIRUBIN mg/dL  --   --  1 24*   ALK PHOS U/L  --   --  139*   ALT U/L  --   --  13   AST U/L  --   --  24   GLUCOSE RANDOM mg/dL 53*   < > 371*    < > = values in this interval not displayed  Results from last 7 days   Lab Units 01/25/22  1113 01/25/22  0857 01/25/22  0846 01/25/22  0742 01/25/22  0708 01/24/22  2120 01/24/22  1623 01/24/22  1224 01/24/22  1149 01/24/22  0614 01/24/22  0230 01/23/22  2119   POC GLUCOSE mg/dl 169* 153* 141* 59* 55* 139 156* 92 60* 74 102 73         Results from last 7 days   Lab Units 01/24/22  0737 01/24/22  0434 01/22/22  1023 01/21/22  0444 01/21/22  0126   LACTIC ACID mmol/L  --   --   --  1 7 2 3*   PROCALCITONIN ng/ml 9 39* 6 86* 17 47*  --  11 61*       Lines/Drains:  Invasive Devices  Report    Peripheral Intravenous Line            Peripheral IV 12/15/21 Dorsal (posterior); Right Forearm 41 days    Peripheral IV 01/22/22 Distal;Right;Ventral (anterior) Wrist 3 days                      Imaging: No pertinent imaging reviewed      Recent Cultures (last 7 days):   Results from last 7 days   Lab Units 01/21/22  0227 01/21/22  0226   BLOOD CULTURE   --  Staphylococcus coagulase negative*  Staphylococcus coagulase negative*  Globicatella sanguinis*  Klebsiella pneumoniae*   GRAM STAIN RESULT   --  Gram positive cocci in clusters*  Gram positive cocci in chains*  Gram negative rods*   URINE CULTURE  >100,000 cfu/ml Klebsiella pneumoniae*  --        Last 24 Hours Medication List:   Current Facility-Administered Medications   Medication Dose Route Frequency Provider Last Rate    cefazolin  1,000 mg Intravenous Q12H Victorino Nelson MD Stopped (01/25/22 8975)    cyclobenzaprine  10 mg Oral TID Jaden Kemp DO      heparin (porcine)  5,000 Units Subcutaneous [place] [address] Taylor Acevedo DO      insulin glargine  50 Units Subcutaneous HS YOLANDE Shaikh      insulin lispro  1-6 Units Subcutaneous HS Taylor Acevedo DO      insulin lispro  10 Units Subcutaneous TID With Meals YOLANDE Hastings      insulin lispro  2-12 Units Subcutaneous TID AC Taylor Acevedo DO      metoprolol tartrate  50 mg Oral BID Zion Albarado PA-C      multi-electrolyte  50 mL/hr Intravenous Continuous Jeannette Sanchez MD 50 mL/hr (01/25/22 0612)    oxyCODONE-acetaminophen  1 5 tablet Oral Q4H PRN Taylor Acevedo DO          Today, Patient Was Seen By: YOLANDE Hastings    **Please Note: This note may have been constructed using a voice recognition system  **

## 2022-01-25 NOTE — PLAN OF CARE
Problem: PAIN - ADULT  Goal: Verbalizes/displays adequate comfort level or baseline comfort level  Description: Interventions:  - Encourage patient to monitor pain and request assistance  - Assess pain using appropriate pain scale  - Administer analgesics based on type and severity of pain and evaluate response  - Implement non-pharmacological measures as appropriate and evaluate response  - Consider cultural and social influences on pain and pain management  - Notify physician/advanced practitioner if interventions unsuccessful or patient reports new pain  Outcome: Progressing     Problem: INFECTION - ADULT  Goal: Absence or prevention of progression during hospitalization  Description: INTERVENTIONS:  - Assess and monitor for signs and symptoms of infection  - Monitor lab/diagnostic results  - Monitor all insertion sites, i e  indwelling lines, tubes, and drains  - Monitor endotracheal if appropriate and nasal secretions for changes in amount and color  - [place] appropriate cooling/warming therapies per order  - Administer medications as ordered  - Instruct and encourage patient and family to use good hand hygiene technique  - Identify and instruct in appropriate isolation precautions for identified infection/condition  Outcome: Progressing  Goal: Absence of fever/infection during neutropenic period  Description: INTERVENTIONS:  - Monitor WBC    Outcome: Progressing

## 2022-01-25 NOTE — PROGRESS NOTES
Progress Note - Nephrology   Venu Torres  78 y o  male MRN: [id number]  Unit/Bed#: [facility] 603-01 Encounter: [id number]      Assessment / Plan:    70-year-old man with a history of stage III CKD with baseline creatinine of 1 4-1 6, hypertension, diabetes mellitus, COPD, CHF on chronic home oxygen  Admitted with a UTI  MARANDA and CKD stage 3  · Baseline creatinine of 1 4-1 6  · MARANDA secondary to urosepsis, bacteremia culminating in ATN  · Creatinine has improved and currently is 1 69  · Renal ultrasound without hydronephrosis on the left, right renal pelvis with prominence without any overt hydronephrosis  · Avoid nephrotoxins and relative hypotension  CKD  · Secondary to diabetes mellitus and hypertension  · Follows with Dr Josh Briscoe as an outpatient  Hyponatremia  · Resolved with hypotonic fluid administration    Other issues:  · Klebsiella bacteremia  · COPD  · Chronic diastolic CHF  · Type 2 diabetes mellitus  · Prostate carcinoma  · H/o Lung cancer  · Chronic respiratory failure with hypoxia and chronic home oxygen  · Anemia-hemoglobin has decreased from 12 2 to 9 9-increased RDW in decreased MCV  Further workup per primary team    Plan:  · Hep-Lock intravenous fluids  · 1 dose of oral diuretic today given increase in weight and peripheral edema   · Recheck BMP in a m  · Trend PVR if needed based on symptoms, last PVR was 104 mL on   · Monitor GFR and re-dose antibiotics as needed        Subjective: The patient was seen examined early this morning  He denies any shortness of breath, chest pain or pressure, abdominal pain, vomiting, diarrhea, sweats, chills, paroxysmal nocturnal dyspnea orthopnea  He felt that his lower extremities had some edema      Objective:     Vitals: Blood pressure 140/66, pulse 100, temperature 98 1 °F (36 7 °C), resp  rate 20, height 6' (1 829 m), weight 112 kg (246 lb 12 8 oz), SpO2 90 %  ,Body mass index is 33 47 kg/m²  Temp (24hrs), Av 1 °F (36 7 °C), Min:97 5 °F (36 4 °C), Max:98 6 °F (37 °C)      Weight (last 2 days)     Date/Time Weight    01/25/22 0537 112 (246 8)    01/24/22 0553 109 (240)    01/23/22 2036 110 (242)                 Intake/Output Summary (Last 24 hours) at 1/25/2022 1410  Last data filed at 1/25/2022 0900  Gross per 24 hour   Intake 1707 5 ml   Output 2150 ml   Net -442 5 ml     I/O last 24 hours: In: 2427 5 [P O :1680; I V :697 5; IV Piggyback:50]  Out: 2850 [Urine:2850]        Physical Exam    /66   Pulse 100   Temp 98 1 °F (36 7 °C)   Resp 20   Ht 6' (1 829 m)   Wt 112 kg (246 lb 12 8 oz)   SpO2 90%   BMI 33 47 kg/m²   Vital signs were reviewed  Constitutional:  The patient was awake, alert, pleasant, cooperative in no apparent distress  Cardiovascular:  No overt jugular venous distention was noted, S1-S2, no pericardial friction rub S3 were appreciated, peripheral edema was noted in both lower extremities with chronic brawny skin changes, edema has increased  Pulmonary:  Adequate inspiratory effort, lungs were clear to auscultation percussion with  Abdominal/GI:  No rales/rhonchi or wheezing noted  Skin:  No hives noted            Invasive Devices  Report    Peripheral Intravenous Line            Peripheral IV 12/15/21 Dorsal (posterior); Right Forearm 41 days    Peripheral IV 01/22/22 Distal;Right;Ventral (anterior) Wrist 3 days                Medications:    Scheduled Meds:  Current Facility-Administered Medications   Medication Dose Route Frequency Provider Last Rate    cefazolin  1,000 mg Intravenous Q12H Lizzie Schwab, MD Stopped (01/25/22 8777)    cyclobenzaprine  10 mg Oral TID Eduardo Lujan DO      heparin (porcine)  5,000 Units Subcutaneous [facility] Eduardo Lujan DO      insulin glargine  50 Units Subcutaneous HS YOLANDE Shaikh      insulin lispro  1-6 Units Subcutaneous HS Eduardo Lujan DO      insulin lispro  10 Units Subcutaneous TID With Meals YOLANDE Lewis      insulin lispro  2-12 Units Subcutaneous TID AC Ekta Hernandes DO      metoprolol tartrate  50 mg Oral BID Maurilio Mendez PA-C      multi-electrolyte  50 mL/hr Intravenous Continuous Darwin Rangel MD 50 mL/hr (01/25/22 1335)    oxyCODONE-acetaminophen  1 5 tablet Oral Q4H PRN Ekta Hernandes DO         PRN Meds: oxyCODONE-acetaminophen    Continuous Infusions:multi-electrolyte, 50 mL/hr, Last Rate: 50 mL/hr (01/25/22 1335)            LAB RESULTS:      Results from last 7 days   Lab Units 01/25/22  0928 01/25/22  0431 01/24/22  0434 01/23/22  0646 01/22/22  1945 01/22/22  0457 01/21/22  1908 01/21/22  1117 01/21/22  0018 01/21/22  0018   WBC Thousand/uL 11 25*  --  11 86* 17 37*  --  19 98*  --  20 27*  --  23 62*   HEMOGLOBIN g/dL 9 8*  --  9 9* 10 4*  --  11 3*  --  11 5*  --  12 2   HEMATOCRIT % 30 9*  --  31 2* 33 1*  --  35 1*  --  35 6*  --  37 7   PLATELETS Thousands/uL 272  --  195 181  --  180  --  178  --  155   NEUTROS PCT % 83*  --  85* 85*  --   --   --  88*  --   --    LYMPHS PCT % 6*  --  5* 4*  --   --   --  3*  --   --    LYMPHO PCT %  --   --   --   --   --   --   --   --   --  2*   MONOS PCT % 9  --  8 9  --   --   --  8  --   --    MONO PCT %  --   --   --   --   --   --   --   --   --  8   EOS PCT % 1  --  1 1  --   --   --  0  --  0   POTASSIUM mmol/L  --  3 4* 3 6 3 7 3 6 3 7 3 9 4 0   < > 3 8   CHLORIDE mmol/L  --  106 104 102 101 96* 95* 94*   < > 93*   CO2 mmol/L  --  26 26 24 27 23 23 28   < > 27   BUN mg/dL  --  46* 60* 66* 64* 55* 51* 43*   < > 32*   CREATININE mg/dL  --  1 69* 2 12* 2 68* 2 80* 2 76* 2 76* 2 70*   < > 2 10*   CALCIUM mg/dL  --  8 1* 8 0* 8 4 9 7 8 2* 8 7 8 9   < > 9 7   ALK PHOS U/L  --   --   --   --   --   --   --   --   --  139*   ALT U/L  --   --   --   --   --   --   --   --   --  13   AST U/L  --   --   --   --   --   --   --   --   --  24   MAGNESIUM mg/dL  --  2 5  --   --   --   --   --   --   --   --    PHOSPHORUS mg/dL  --  3 1  --   --   --   --   --   --   --   --     < > = values in this interval not displayed  CUTURES:  Lab Results   Component Value Date    BLOODCX Klebsiella pneumoniae (A) 01/21/2022    BLOODCX Staphylococcus coagulase negative (A) 01/21/2022    BLOODCX Staphylococcus coagulase negative (A) 01/21/2022    BLOODCX Globicatella sanguinis (A) 01/21/2022    BLOODCX No Growth After 5 Days  06/20/2018    BLOODCX No Growth After 5 Days  06/20/2018    BLOODCX No Growth After 5 Days  06/19/2018    URINECX >100,000 cfu/ml Klebsiella pneumoniae (A) 01/21/2022    URINECX >100,000 cfu/ml  09/11/2018    URINECX >100,000 cfu/ml Staphylococcus coagulase negative 12/03/2016                 PLEASE NOTE:  This encounter was completed utilizing the [employer] Direct Speech Voice Recognition Software  Grammatical errors, random word insertions, pronoun errors and incomplete sentences are occasional consequences of the system due to software limitations, ambient noise and hardware issues  These may be missed by proof reading prior to affixing electronic signature  Any questions or concerns about the content, text or information contained within the body of this dictation should be directly addressed to the physician for clarification  Please do not hesitate to call me directly if you have any any questions or concerns

## 2022-01-25 NOTE — ASSESSMENT & PLAN NOTE
· Hgb 11 3 1/22, 9 9 1/24  · Has been on continuous fluids for MARANDA, could be dilutional component   · Will order repeat CBC, iron studies, B12 and heme stool   · Pt denies subjective hematuria, blood in stool

## 2022-01-25 NOTE — CASE MANAGEMENT
Case Management Progress Note    Patient name Candice Platt  Location [facility] 603/[facility] 296-47 MRN [id number]  : 1945 Date 2022       LOS (days): 4  Geometric Mean LOS (GMLOS) (days): 4 80  Days to GMLOS:0 3        OBJECTIVE:        Current admission status: Inpatient  Preferred Pharmacy:   [address] [place] [address]  Phone: [phone] Fax: [phone]    Primary Care Provider: Froilan Menjivar MD    Primary Insurance: [facility]  Secondary Insurance:     PROGRESS NOTE: TC to son, Inga Crabtree, inquiring as to whether patient received covid vaccines    VM left to call this CM

## 2022-01-26 PROBLEM — D50.9 ANEMIA, IRON DEFICIENCY: Status: ACTIVE | Noted: 2020-09-01

## 2022-01-26 PROBLEM — E87.1 HYPONATREMIA: Status: RESOLVED | Noted: 2017-07-24 | Resolved: 2022-01-26

## 2022-01-26 LAB
ANION GAP SERPL CALCULATED.3IONS-SCNC: 6 MMOL/L (ref 4–13)
BUN SERPL-MCNC: 44 MG/DL (ref 5–25)
CALCIUM SERPL-MCNC: 8.6 MG/DL (ref 8.3–10.1)
CHLORIDE SERPL-SCNC: 106 MMOL/L (ref 100–108)
CO2 SERPL-SCNC: 29 MMOL/L (ref 21–32)
CREAT SERPL-MCNC: 1.7 MG/DL (ref 0.6–1.3)
GFR SERPL CREATININE-BSD FRML MDRD: 38 ML/MIN/1.73SQ M
GLUCOSE SERPL-MCNC: 109 MG/DL (ref 65–140)
GLUCOSE SERPL-MCNC: 125 MG/DL (ref 65–140)
GLUCOSE SERPL-MCNC: 182 MG/DL (ref 65–140)
GLUCOSE SERPL-MCNC: 189 MG/DL (ref 65–140)
GLUCOSE SERPL-MCNC: 33 MG/DL (ref 65–140)
GLUCOSE SERPL-MCNC: 47 MG/DL (ref 65–140)
GLUCOSE SERPL-MCNC: 88 MG/DL (ref 65–140)
POTASSIUM SERPL-SCNC: 4.2 MMOL/L (ref 3.5–5.3)
SODIUM SERPL-SCNC: 141 MMOL/L (ref 136–145)

## 2022-01-26 PROCEDURE — 97535 SELF CARE MNGMENT TRAINING: CPT

## 2022-01-26 PROCEDURE — 99232 SBSQ HOSP IP/OBS MODERATE 35: CPT | Performed by: INTERNAL MEDICINE

## 2022-01-26 PROCEDURE — 82948 REAGENT STRIP/BLOOD GLUCOSE: CPT

## 2022-01-26 PROCEDURE — 99231 SBSQ HOSP IP/OBS SF/LOW 25: CPT | Performed by: INTERNAL MEDICINE

## 2022-01-26 PROCEDURE — 80048 BASIC METABOLIC PNL TOTAL CA: CPT | Performed by: INTERNAL MEDICINE

## 2022-01-26 RX ORDER — INSULIN GLARGINE 100 [IU]/ML
25 INJECTION, SOLUTION SUBCUTANEOUS
Status: DISCONTINUED | OUTPATIENT
Start: 2022-01-26 | End: 2022-01-26

## 2022-01-26 RX ORDER — INSULIN GLARGINE 100 [IU]/ML
15 INJECTION, SOLUTION SUBCUTANEOUS
Status: DISCONTINUED | OUTPATIENT
Start: 2022-01-26 | End: 2022-01-28

## 2022-01-26 RX ORDER — TORSEMIDE 20 MG/1
10 TABLET ORAL DAILY
Status: DISCONTINUED | OUTPATIENT
Start: 2022-01-26 | End: 2022-01-27

## 2022-01-26 RX ADMIN — INSULIN GLARGINE 15 UNITS: 100 INJECTION, SOLUTION SUBCUTANEOUS at 22:06

## 2022-01-26 RX ADMIN — INSULIN LISPRO 1 UNITS: 100 INJECTION, SOLUTION INTRAVENOUS; SUBCUTANEOUS at 22:05

## 2022-01-26 RX ADMIN — CYCLOBENZAPRINE HYDROCHLORIDE 10 MG: 10 TABLET, FILM COATED ORAL at 17:32

## 2022-01-26 RX ADMIN — CEPHALEXIN 500 MG: 500 CAPSULE ORAL at 12:37

## 2022-01-26 RX ADMIN — CYCLOBENZAPRINE HYDROCHLORIDE 10 MG: 10 TABLET, FILM COATED ORAL at 09:06

## 2022-01-26 RX ADMIN — TORSEMIDE 10 MG: 20 TABLET ORAL at 12:37

## 2022-01-26 RX ADMIN — CEPHALEXIN 500 MG: 500 CAPSULE ORAL at 22:06

## 2022-01-26 RX ADMIN — METOPROLOL TARTRATE 50 MG: 50 TABLET, FILM COATED ORAL at 17:32

## 2022-01-26 RX ADMIN — CYCLOBENZAPRINE HYDROCHLORIDE 10 MG: 10 TABLET, FILM COATED ORAL at 22:06

## 2022-01-26 RX ADMIN — CEPHALEXIN 500 MG: 500 CAPSULE ORAL at 17:32

## 2022-01-26 RX ADMIN — CEPHALEXIN 500 MG: 500 CAPSULE ORAL at 09:08

## 2022-01-26 RX ADMIN — HEPARIN SODIUM 5000 UNITS: 5000 INJECTION INTRAVENOUS; SUBCUTANEOUS at 15:06

## 2022-01-26 RX ADMIN — Medication 4 G: at 06:59

## 2022-01-26 RX ADMIN — HEPARIN SODIUM 5000 UNITS: 5000 INJECTION INTRAVENOUS; SUBCUTANEOUS at 06:24

## 2022-01-26 RX ADMIN — HEPARIN SODIUM 5000 UNITS: 5000 INJECTION INTRAVENOUS; SUBCUTANEOUS at 22:06

## 2022-01-26 NOTE — PROGRESS NOTES
[address] [facility]  Progress Note - Dakota Ibarra Sr  1945, 78 y o  male MRN: [id number]  Unit/Bed#: [facility] 603-01 Encounter: [id number]  Primary Care Provider: Anu Paulson MD   Date and time admitted to hospital: 1/20/2022 11:50 PM    * Sepsis with acute renal failure without septic shock [facility])  Assessment & Plan  Suspected, tachycardia/tachypnea/leukocytosis on presentation  Danika Mcleod was complaining of urinary urgency thus suspected source is UTI and UA with pyuria/bacteriuria   Additionally with lactic acidosis resolved s/p IV fluids  · Remains hemodynamically stable and nontoxic appearing at this time  · Urine culture showing Klebsiella pneumoniae  · Blood cultures 1/2 showing Klebsiella, other showing gram positive cocci in clusters and gram positive cocci in chains which is likely contaminant   · Trend WBC, temperature curve, hemodynamics - Most recent WBC 11 25 1/25, trending down   · Procal elevated at 17, trend - most recent procal 9 on 1/24  · Downgraded to Cefazolin per ID, Discussed with Dr Copeland Nations ID can transition to p o Keflex 500 mg q i d  x 1 week today to complete 14 day abx course  · Regarding acute renal failure baseline creatinine appears 1 4 and he presented with creat of 2 10  ? See further plan below under MARANDA      Bacteremia  Assessment & Plan  1 out of 2 blood cultures from admission with Klebsiella and other with gram positive cocci in clusters and chains  · IV abx as above for one, other suspected contaminant   · ID following    Acute kidney injury superimposed on chronic kidney disease [facility])  Assessment & Plan  Lab Results   Component Value Date    EGFR 38 01/26/2022    EGFR 39 01/25/2022    EGFR 29 01/24/2022    CREATININE 1 70 (H) 01/26/2022    CREATININE 1 69 (H) 01/25/2022    CREATININE 2 12 (H) 01/24/2022   POA creatinine 2 10, baseline around 1 2-1 4  · Etiology suspected to be from sepsis/dehdyration   S/P IVF on admission  · Continue to ensure no retention by checking bladder scan, PVRs--has not been done despite orders for several days  · Avoid hypotension, currently normotensive   · Holding lasix  · Renal U/S without hydro      COPD, severe ([facility] [address] )  Assessment & Plan  Not in acute exacerbation  · Continue Trelegy, p r n  Nebulizers    Fracture of navicular bone of left foot  Assessment & Plan  Recent, December 2021  · Seen by ortho, was WBAT LLE CAM boot  · Recommended outpatient f/u 4 weeks which he has not done  Has not been wearing boot  · Spoke with ortho on 1/21 recommended to check xray  · Xray shows healing fracture with stable alignment  · Will recommend continued WBAT and outpatient ortho f/u    Chronic respiratory failure with hypoxia ([facility] [address] )  Assessment & Plan  On 3 L NC continuously at baseline  · Continue supplemental oxygen and titrate as needed to maintain SaO2 88-94%  · CXR with stable findings     Essential hypertension  Assessment & Plan  Continue lopressor however was increased to 50 mg BID given ongoing tachycardia despite sepsis treatment  · Hold lasix given MARANDA    Chronic diastolic heart failure (HCC)  Assessment & Plan  Wt Readings from Last 3 Encounters:   01/25/22 112 kg (246 lb 12 8 oz)   12/16/21 104 kg (229 lb 11 5 oz)   07/25/21 111 kg (244 lb 12 8 oz)     Recent admission with acute on chronic heart failure  Appears fairly euvolemic at this time despite imaging showing loculated pleural effusion on admission     · Monitor daily weights, I/O, volume status  · Low-sodium, fluid-restricted diet; patient admitted to some recent dietary indiscretion thus counseling provided        Type 2 diabetes mellitus with hyperglycemia, with long-term current use of insulin [facility])  Assessment & Plan  Lab Results   Component Value Date    HGBA1C 9 2 (H) 04/14/2021       Recent Labs     01/25/22  2107 01/26/22  0656 01/26/22  0807 01/26/22  1150   POCGLU 176* 47* 88 109       Blood Sugar Average: Last 72 hrs:  · (P) 117 45   · A1C poorly controlled, with hyperglycemia on admission  · Home regimen: U500 45 units before breakfast and lunch  · Was switched to basal/bolus on admission, continue to titrate PRN  · SSI with Accu-Cheks, carb controlled diet  · Hypoglycemia protocol  · Persistent hypoglycemia: insulin adjustment as follows, basal dose adjusted to 15 units QHS, mealtime bolus dose adjusted to 3 units     Hyponatremia-resolved as of 1/26/2022  Assessment & Plan  Suspect in setting of hyperglycemia, dehydration  Suspect some element of chronicity   · IVF as above  · Recent sodium 141    Anemia, iron deficiency  Assessment & Plan  · Hgb 11 3 1/22, 9 9 1/24  · Has been on continuous fluids for MARANDA, could be dilutional component   · Will order repeat CBC, iron studies, B12 and heme stool   · Pending heme stool, iron studies reveal iron deficiency   · Pt denies subjective hematuria, blood in stool       VTE Pharmacologic Prophylaxis: VTE Score: 5 Moderate Risk (Score 3-4) - Pharmacological DVT Prophylaxis Ordered: heparin  Patient Centered Rounds: I performed bedside rounds with nursing staff today  Discussions with Specialists or Other Care Team Provider: case management and nursing     Education and Discussions with Family / Patient: Attempted to update  (son) via phone  Unable to contact  Time Spent for Care: 30 minutes  More than 50% of total time spent on counseling and coordination of care as described above  Current Length of Stay: 5 day(s)  Current Patient Status: Inpatient   Certification Statement: The patient will continue to require additional inpatient hospital stay due to ongoing management of sepsis with acute renal renal failure, MARANDA   Discharge Plan: Anticipate discharge in 24-48 hrs to rehab facility  Code Status: Level 1 - Full Code    Subjective:   Patient reports feeling well today  He endorses no changes to appetite, N/V/D/C, LE edema, fatigue, sob, cp, hematuria, or blood in stool  He is tolerating p o   He reports he is frustrated about not being able to go home, wants to live in an efficiency by himself  States his son works night shift, likely why he is unable to be reached during the day  Objective:     Vitals:   Temp (24hrs), Av 7 °F (36 5 °C), Min:97 4 °F (36 3 °C), Max:98 2 °F (36 8 °C)    Temp:  [97 4 °F (36 3 °C)-98 2 °F (36 8 °C)] 97 4 °F (36 3 °C)  HR:  [] 102  Resp:  [16-18] 17  BP: (110-158)/(67-94) 128/85  SpO2:  [90 %-98 %] 97 %  Body mass index is 33 47 kg/m²  Input and Output Summary (last 24 hours): Intake/Output Summary (Last 24 hours) at 2022 1421  Last data filed at 2022 1246  Gross per 24 hour   Intake 390 ml   Output 2647 ml   Net -2257 ml       Physical Exam:   Physical Exam  Vitals and nursing note reviewed  Constitutional:       Appearance: He is obese  Comments: disheveled appearance   Eyes:      Conjunctiva/sclera: Conjunctivae normal       Pupils: Pupils are equal, round, and reactive to light  Cardiovascular:      Rate and Rhythm: Normal rate and regular rhythm  Heart sounds: S1 normal and S2 normal    Pulmonary:      Effort: No respiratory distress  Breath sounds: Normal breath sounds  No wheezing or rales  Abdominal:      General: There is no distension  Tenderness: There is no abdominal tenderness  Musculoskeletal:      Right lower leg: No edema  Left lower leg: No edema  Skin:     General: Skin is warm and dry  Neurological:      Mental Status: He is oriented to person, place, and time     Psychiatric:      Comments: Slightly strange affect          Additional Data:     Labs:  Results from last 7 days   Lab Units 22  0928 22  1117 22  0018   WBC Thousand/uL 11 25*   < > 23 62*   HEMOGLOBIN g/dL 9 8*   < > 12 2   HEMATOCRIT % 30 9*   < > 37 7   PLATELETS Thousands/uL 272   < > 155   BANDS PCT %  --   --  17*   NEUTROS PCT % 83*   < >  --    LYMPHS PCT % 6*   < >  --    LYMPHO PCT %  --   --  2* MONOS PCT % 9   < >  --    MONO PCT %  --   --  8   EOS PCT % 1   < > 0    < > = values in this interval not displayed  Results from last 7 days   Lab Units 01/26/22  0459 01/21/22  1117 01/21/22  0018   SODIUM mmol/L 141   < > 130*   POTASSIUM mmol/L 4 2   < > 3 8   CHLORIDE mmol/L 106   < > 93*   CO2 mmol/L 29   < > 27   BUN mg/dL 44*   < > 32*   CREATININE mg/dL 1 70*   < > 2 10*   ANION GAP mmol/L 6   < > 10   CALCIUM mg/dL 8 6   < > 9 7   ALBUMIN g/dL  --   --  2 5*   TOTAL BILIRUBIN mg/dL  --   --  1 24*   ALK PHOS U/L  --   --  139*   ALT U/L  --   --  13   AST U/L  --   --  24   GLUCOSE RANDOM mg/dL 33*   < > 371*    < > = values in this interval not displayed  Results from last 7 days   Lab Units 01/26/22  1150 01/26/22  0807 01/26/22  0656 01/25/22  2107 01/25/22  1704 01/25/22  1113 01/25/22  0857 01/25/22  0846 01/25/22  0742 01/25/22  0708 01/24/22  2120 01/24/22  1623   POC GLUCOSE mg/dl 109 88 47* 176* 161* 169* 153* 141* 59* 55* 139 156*         Results from last 7 days   Lab Units 01/24/22  0737 01/24/22  0434 01/22/22  1023 01/21/22  0444 01/21/22  0126   LACTIC ACID mmol/L  --   --   --  1 7 2 3*   PROCALCITONIN ng/ml 9 39* 6 86* 17 47*  --  11 61*       Lines/Drains:  Invasive Devices  Report    Peripheral Intravenous Line            Peripheral IV 12/15/21 Dorsal (posterior); Right Forearm 42 days    Peripheral IV 01/26/22 Right Antecubital <1 day                      Imaging: No pertinent imaging reviewed      Recent Cultures (last 7 days):   Results from last 7 days   Lab Units 01/21/22  0227 01/21/22  0226   BLOOD CULTURE   --  Staphylococcus coagulase negative*  Staphylococcus coagulase negative*  Globicatella sanguinis*  Klebsiella pneumoniae*   GRAM STAIN RESULT   --  Gram positive cocci in clusters*  Gram positive cocci in chains*  Gram negative rods*   URINE CULTURE  >100,000 cfu/ml Klebsiella pneumoniae*  --        Last 24 Hours Medication List:   Current Facility-Administered Medications   Medication Dose Route Frequency Provider Last Rate    cephalexin  500 mg Oral 4x Daily Linh Olivares MD      cyclobenzaprine  10 mg Oral TID Benson Flannery DO      heparin (porcine)  5,000 Units Subcutaneous [facility] Benosn Flannery, DO      insulin glargine  15 Units Subcutaneous HS YOLANDE Shaikh      insulin lispro  1-6 Units Subcutaneous HS Benson Flannery, DO      insulin lispro  2-12 Units Subcutaneous TID AC Benson Flannery, DO      insulin lispro  3 Units Subcutaneous TID With Meals YOLANDE Suarez      metoprolol tartrate  50 mg Oral BID Ivet Adkins PA-C      oxyCODONE-acetaminophen  1 5 tablet Oral Q4H PRN Benson Flannery,       torsemide  10 mg Oral Daily Stevie Hanna MD          Today, Patient Was Seen By: YOLANDE Suarez    **Please Note: This note may have been constructed using a voice recognition system  **

## 2022-01-26 NOTE — ASSESSMENT & PLAN NOTE
Suspected, tachycardia/tachypnea/leukocytosis on presentation  Walt Garcia was complaining of urinary urgency thus suspected source is UTI and UA with pyuria/bacteriuria   Additionally with lactic acidosis resolved s/p IV fluids  · Remains hemodynamically stable and nontoxic appearing at this time  · Urine culture showing Klebsiella pneumoniae  · Blood cultures 1/2 showing Klebsiella, other showing gram positive cocci in clusters and gram positive cocci in chains which is likely contaminant   · Trend WBC, temperature curve, hemodynamics - Most recent WBC 11 25 1/25, trending down   · Procal elevated at 17, trend - most recent procal 9 on 1/24  · Downgraded to Cefazolin per ID, Discussed with Dr Brynn Julio ID can transition to p o Keflex 500 mg q i d  x 1 week today to complete 14 day abx course  · Regarding acute renal failure baseline creatinine appears 1 4 and he presented with creat of 2 10  ?  See further plan below under MARANDA

## 2022-01-26 NOTE — ASSESSMENT & PLAN NOTE
· Hgb 11 3 1/22, 9 9 1/24  · Has been on continuous fluids for MARANDA, could be dilutional component   · Will order repeat CBC, iron studies, B12 and heme stool   · Pending heme stool, iron studies reveal iron deficiency   · Pt denies subjective hematuria, blood in stool

## 2022-01-26 NOTE — PHYSICAL THERAPY NOTE
Physical Therapy Cancellation Note    Attempted to see pt twice this AM   Pt initially eating breakfast, then was lying in bed, reporting desire to sleep  Declined activity despite education, encouragement & need for activity  Will continue to follow and treat as appropriate      Liberty Zheng PTA

## 2022-01-26 NOTE — ASSESSMENT & PLAN NOTE
Lab Results   Component Value Date    EGFR 38 01/26/2022    EGFR 39 01/25/2022    EGFR 29 01/24/2022    CREATININE 1 70 (H) 01/26/2022    CREATININE 1 69 (H) 01/25/2022    CREATININE 2 12 (H) 01/24/2022   POA creatinine 2 10, baseline around 1 2-1 4  · Etiology suspected to be from sepsis/dehdyration   S/P IVF on admission  · Continue to ensure no retention by checking bladder scan, PVRs--has not been done despite orders for several days  · Avoid hypotension, currently normotensive   · Holding lasix  · Renal U/S without hydro

## 2022-01-26 NOTE — PROGRESS NOTES
Progress Note - Nephrology   Danna Wing  78 y o  male MRN: [id number]  Unit/Bed#: [facility] 603-01 Encounter: [id number]      Assessment / Plan:    77-year-old man with a history of stage III CKD with baseline creatinine of 1 4-1 6, hypertension, diabetes mellitus, COPD, CHF on chronic home oxygen  Admitted with a UTI  MARANDA and CKD stage 3  · Baseline creatinine of 1 4-1 6  · MARANDA secondary to urosepsis, bacteremia culminating in ATN  · Creatinine has improved and currently is 1 70  · Renal ultrasound without hydronephrosis on the left, right renal pelvis with prominence without any overt hydronephrosis  · Avoid nephrotoxins and relative hypotension  CKD  · Secondary to diabetes mellitus and hypertension  · Follows with Dr Federico Ace as an outpatient  Hyponatremia  · Resolved with hypotonic fluid administration    Other issues:  · Klebsiella bacteremia  · COPD  · Chronic diastolic CHF  · Type 2 diabetes mellitus  · Prostate carcinoma  · H/o Lung cancer  · Chronic respiratory failure with hypoxia and chronic home oxygen  · Anemia-hemoglobin has decreased from 12 2 to 9 9-increased RDW in decreased MCV  Further workup per primary team - normal B12 and folate/iron 28-iron sat 9%-ferritin 157    Plan:  · 1 dose of oral diuretic today given significant peripheral edema   · Recheck BMP in a m  · Trend PVR if needed based on symptoms, last PVR was 104 mL on 01/23  Recheck today  · Monitor GFR and re-dose antibiotics as needed  · Would initiate oral iron, intravenous once infection cleared  Would also check stool guaiacs - per primary team        Subjective: The patient was seen examined  He denied any shortness of breath, chest pain or pressure, abdominal pain, vomiting, diarrhea, sweats, chills, paroxysmal nocturnal dyspnea orthopnea  He felt that his peripheral edema has not changed much overnight    He denied any difficulty urinating      Objective:     Vitals: Blood pressure 110/70, pulse 102, temperature Susannah Mar ) 97 4 °F (36 3 °C), resp  rate 17, height 6' (1 829 m), weight 112 kg (246 lb 12 8 oz), SpO2 97 %  ,Body mass index is 33 47 kg/m²  Temp (24hrs), Av 7 °F (36 5 °C), Min:97 4 °F (36 3 °C), Max:98 2 °F (36 8 °C)      Weight (last 2 days)     Date/Time Weight    22 0537 112 (246 8)    22 0553 109 (240)                 Intake/Output Summary (Last 24 hours) at 2022 1141  Last data filed at 2022 1101  Gross per 24 hour   Intake 390 ml   Output 2253 ml   Net -1863 ml     I/O last 24 hours: In: 413 [P O :820; IV Piggyback:50]  Out: 9857 [Urine:3503]        Physical Exam    /70   Pulse 102   Temp (!) 97 4 °F (36 3 °C)   Resp 17   Ht 6' (1 829 m)   Wt 112 kg (246 lb 12 8 oz)   SpO2 97%   BMI 33 47 kg/m²   Vital signs were reviewed  Constitutional:  The patient was awake, alert, pleasant, cooperative and in no apparent distress  Cardiovascular:  No overt jugular venous distention was noted, S1-S2, no pericardial friction rub S3 appreciated, peripheral edema is unchanged from   Pulmonary:  Adequate inspiratory effort, lungs were clear a station percussion with no rales/rhonchi wheezing noted  Abdominal/GI:  Soft, distended, no rebound or guarding was noted  Skin:  No hives noted, brawny skin changes of lower extremities            Invasive Devices  Report    Peripheral Intravenous Line            Peripheral IV 12/15/21 Dorsal (posterior); Right Forearm 42 days    Peripheral IV 22 Right Antecubital <1 day                Medications:    Scheduled Meds:  Current Facility-Administered Medications   Medication Dose Route Frequency Provider Last Rate    cephalexin  500 mg Oral 4x Daily Cesario Chandler MD      cyclobenzaprine  10 mg Oral TID Yuli Bangura DO      heparin (porcine)  5,000 Units Subcutaneous [facility] Yuli Bangura DO      insulin glargine  25 Units Subcutaneous HS YOLANDE Shaikh      insulin lispro  1-6 Units Subcutaneous HS DO Darion Lara insulin lispro  2-12 Units Subcutaneous TID AC Dameon Nunez DO      insulin lispro  5 Units Subcutaneous TID With Meals YOLANDE Lebron      metoprolol tartrate  50 mg Oral BID Dianne Yanez PA-C      oxyCODONE-acetaminophen  1 5 tablet Oral Q4H PRN Dameon Nunez DO         PRN Meds: oxyCODONE-acetaminophen    Continuous Infusions:         LAB RESULTS:      Results from last 7 days   Lab Units 01/26/22  0459 01/25/22  0928 01/25/22  0431 01/24/22  0434 01/23/22  0646 01/22/22  1945 01/22/22  0457 01/21/22  1908 01/21/22  1117 01/21/22  1117 01/21/22  0018 01/21/22  0018   WBC Thousand/uL  --  11 25*  --  11 86* 17 37*  --  19 98*  --   --  20 27*  --  23 62*   HEMOGLOBIN g/dL  --  9 8*  --  9 9* 10 4*  --  11 3*  --   --  11 5*  --  12 2   HEMATOCRIT %  --  30 9*  --  31 2* 33 1*  --  35 1*  --   --  35 6*  --  37 7   PLATELETS Thousands/uL  --  272  --  195 181  --  180  --   --  178  --  155   NEUTROS PCT %  --  83*  --  85* 85*  --   --   --   --  88*  --   --    LYMPHS PCT %  --  6*  --  5* 4*  --   --   --   --  3*  --   --    LYMPHO PCT %  --   --   --   --   --   --   --   --   --   --   --  2*   MONOS PCT %  --  9  --  8 9  --   --   --   --  8  --   --    MONO PCT %  --   --   --   --   --   --   --   --   --   --   --  8   EOS PCT %  --  1  --  1 1  --   --   --   --  0  --  0   POTASSIUM mmol/L 4 2  --  3 4* 3 6 3 7 3 6 3 7 3 9   < > 4 0   < > 3 8   CHLORIDE mmol/L 106  --  106 104 102 101 96* 95*   < > 94*   < > 93*   CO2 mmol/L 29  --  26 26 24 27 23 23   < > 28   < > 27   BUN mg/dL 44*  --  46* 60* 66* 64* 55* 51*   < > 43*   < > 32*   CREATININE mg/dL 1 70*  --  1 69* 2 12* 2 68* 2 80* 2 76* 2 76*   < > 2 70*   < > 2 10*   CALCIUM mg/dL 8 6  --  8 1* 8 0* 8 4 9 7 8 2* 8 7   < > 8 9   < > 9 7   ALK PHOS U/L  --   --   --   --   --   --   --   --   --   --   --  139*   ALT U/L  --   --   --   --   --   --   --   --   --   --   --  13   AST U/L  --   --   --   --   --   --   --   --   -- --   --  24   MAGNESIUM mg/dL  --   --  2 5  --   --   --   --   --   --   --   --   --    PHOSPHORUS mg/dL  --   --  3 1  --   --   --   --   --   --   --   --   --     < > = values in this interval not displayed  CUTURES:  Lab Results   Component Value Date    BLOODCX Klebsiella pneumoniae (A) 01/21/2022    BLOODCX Staphylococcus coagulase negative (A) 01/21/2022    BLOODCX Staphylococcus coagulase negative (A) 01/21/2022    BLOODCX Globicatella sanguinis (A) 01/21/2022    BLOODCX No Growth After 5 Days  06/20/2018    BLOODCX No Growth After 5 Days  06/20/2018    BLOODCX No Growth After 5 Days  06/19/2018    URINECX >100,000 cfu/ml Klebsiella pneumoniae (A) 01/21/2022    URINECX >100,000 cfu/ml  09/11/2018    URINECX >100,000 cfu/ml Staphylococcus coagulase negative 12/03/2016                 PLEASE NOTE:  This encounter was completed utilizing the [employer]/Fluency Direct Speech Voice Recognition Software  Grammatical errors, random word insertions, pronoun errors and incomplete sentences are occasional consequences of the system due to software limitations, ambient noise and hardware issues  These may be missed by proof reading prior to affixing electronic signature  Any questions or concerns about the content, text or information contained within the body of this dictation should be directly addressed to the physician for clarification  Please do not hesitate to call me directly if you have any any questions or concerns

## 2022-01-26 NOTE — ASSESSMENT & PLAN NOTE
Lab Results   Component Value Date    HGBA1C 9 2 (H) 04/14/2021       Recent Labs     01/25/22  2107 01/26/22  0656 01/26/22  0807 01/26/22  1150   POCGLU 176* 47* 88 109       Blood Sugar Average: Last 72 hrs:  · (P) 117 45   · A1C poorly controlled, with hyperglycemia on admission  · Home regimen: U500 45 units before breakfast and lunch  · Was switched to basal/bolus on admission, continue to titrate PRN  · SSI with Accu-Cheks, carb controlled diet  · Hypoglycemia protocol  · Persistent hypoglycemia: insulin adjustment as follows, basal dose adjusted to 15 units QHS, mealtime bolus dose adjusted to 3 units

## 2022-01-26 NOTE — PROGRESS NOTES
Progress Note - Infectious Disease   Antonia Brantley Sr  78 y o  male MRN: [id number]  Unit/Bed#: [facility] 603-01 Encounter: [id number]      Impression:  1  Klebsiella pneumoniae urosepsis with MARANDA on CKD  Gram-positive cocci contaminant  2  COPD with Chronic respiratory failure with hypoxia  3  Essential hypertension  4  Chronic diastolic CHF   5  Type 2 DM  6  History of prostate carcinoma     Recommendations:   Patient is afebrile with WBC count 11,250  1  One blood and urine culture showing Klebsiella pneumoniae that is cefazolin susceptible  The other blood culture showing a coagulase-negative Staphylococcus which is a contaminant  2  Continue cephalexin 500 mg q i d  [place] x 1 week  Antibiotics:   1  Cephalexin 500 mg q i d p o , Day 1 of 7    Subjective:  Some pain in both legs and back  Denies fevers, chills, or sweats  Denies nausea, vomiting, or diarrhea  Objective:  Vitals:  Temp:  [97 4 °F (36 3 °C)-97 5 °F (36 4 °C)] 97 5 °F (36 4 °C)  HR:  [] 100  Resp:  [17-18] 18  BP: (110-150)/(70-94) 130/80  SpO2:  [96 %-98 %] 97 %  Temp (24hrs), Av 4 °F (36 3 °C), Min:97 4 °F (36 3 °C), Max:97 5 °F (36 4 °C)  Current: Temperature: 97 5 °F (36 4 °C)    Physical Exam:     General Appearance:  Alert, chronically ill-appearing nontoxic, no acute distress  Throat: Oropharynx moist without lesions  Lips, mucosa, and tongue normal   Neck: Supple, symmetrical, trachea midline, no adenopathy,  no tenderness/mass/nodules   Lungs:   Hyper resonant few diffuse rhonchi, decreased respiratory expansion    Heart:  Regular rate and rhythm, S1, S2 normal, no murmur, rub or gallop   Abdomen:   Soft, non-tender, markedly protuberant non-distended, positive bowel sounds  No masses, no organomegaly    No CVA tenderness   Extremities: Lower extremity stasis changes   Skin: As above  Invasive Devices  Report    Peripheral Intravenous Line            Peripheral IV 12/15/21 Dorsal (posterior); Right Forearm 42 days    Peripheral IV 01/26/22 Right Antecubital <1 day                Labs, Imaging, & Other studies:   All pertinent labs were personally reviewed  Results from last 7 days   Lab Units 01/25/22  0928 01/24/22  0434 01/23/22  0646   WBC Thousand/uL 11 25* 11 86* 17 37*   HEMOGLOBIN g/dL 9 8* 9 9* 10 4*   PLATELETS Thousands/uL 272 195 181     Results from last 7 days   Lab Units 01/26/22  0459 01/25/22  0431 01/25/22  0431 01/24/22  0434 01/24/22  0434 01/21/22  1117 01/21/22  0018   SODIUM mmol/L 141  --  139  --  136   < > 130*   POTASSIUM mmol/L 4 2   < > 3 4*   < > 3 6   < > 3 8   CHLORIDE mmol/L 106   < > 106   < > 104   < > 93*   CO2 mmol/L 29   < > 26   < > 26   < > 27   BUN mg/dL 44*   < > 46*   < > 60*   < > 32*   CREATININE mg/dL 1 70*   < > 1 69*   < > 2 12*   < > 2 10*   EGFR ml/min/1 73sq m 38   < > 39   < > 29   < > 30   CALCIUM mg/dL 8 6   < > 8 1*   < > 8 0*   < > 9 7   AST U/L  --   --   --   --   --   --  24   ALT U/L  --   --   --   --   --   --  13   ALK PHOS U/L  --   --   --   --   --   --  139*    < > = values in this interval not displayed       Results from last 7 days   Lab Units 01/21/22 0227 01/21/22 0226   BLOOD CULTURE   --  Staphylococcus coagulase negative*  Staphylococcus coagulase negative*  Globicatella sanguinis*  Klebsiella pneumoniae*   GRAM STAIN RESULT   --  Gram positive cocci in clusters*  Gram positive cocci in chains*  Gram negative rods*   URINE CULTURE  >100,000 cfu/ml Klebsiella pneumoniae*  --

## 2022-01-26 NOTE — ASSESSMENT & PLAN NOTE
Wt Readings from Last 3 Encounters:   01/25/22 112 kg (246 lb 12 8 oz)   12/16/21 104 kg (229 lb 11 5 oz)   07/25/21 111 kg (244 lb 12 8 oz)     Recent admission with acute on chronic heart failure  Appears fairly euvolemic at this time despite imaging showing loculated pleural effusion on admission     · Monitor daily weights, I/O, volume status  · Low-sodium, fluid-restricted diet; patient admitted to some recent dietary indiscretion thus counseling provided

## 2022-01-26 NOTE — PLAN OF CARE
Problem: OCCUPATIONAL THERAPY ADULT  Goal: Performs self-care activities at highest level of function for planned discharge setting  See evaluation for individualized goals  Description: Treatment Interventions: ADL retraining,Functional transfer training,UE strengthening/ROM,Endurance training,Cognitive reorientation,Patient/family training,Equipment evaluation/education,Compensatory technique education,Energy conservation,Activityengagement          See flowsheet documentation for full assessment, interventions and recommendations  Outcome: Progressing  Note: Limitation: Decreased ADL status,Decreased Safe judgement during ADL,Decreased endurance,Decreased self-care trans,Decreased high-level ADLs  Prognosis: Good,Fair  Assessment: pt participated in brief am ot session for oob for lunch meal  pt was able to set up own tray and feed self  pt was noted to use unsafe reaching techniques for oob to chiar, pt refuses use of rw in hospital room  pt was able to perform spt with close sba  pt was noted to be sleeping in bed upon ot arrival  pts lunch tray also arrived and pt was agreeable to demsontrate transfers inorder to sit in chair for meal  pt reportedly refused oob earlier today  will follow focusing on goals from ie  pt noted with disheveled hair, flat affect and was  minimally conversant       OT Discharge Recommendation: Post acute rehabilitation services  OT - OK to Discharge:  Yes  April ILYA Liang

## 2022-01-26 NOTE — PROGRESS NOTES
Progress Note - Infectious Disease   Dakota Ibarra Sr  78 y o  male MRN: [id number]  Unit/Bed#: [facility] 603-01 Encounter: [id number]      Impression:  1  Klebsiella pneumoniae urosepsis with MARANDA on CKD  Gram-positive cocci contaminant  2  COPD with Chronic respiratory failure with hypoxia  3  Essential hypertension  4  Chronic diastolic CHF   5  Type 2 DM  6  History of prostate carcinoma     Recommendations:   Patient is afebrile with WBC count 11,250  1  One blood and urine culture showing Klebsiella pneumoniae that is cefazolin susceptible  The other blood culture showing a coagulase-negative Staphylococcus which is a contaminant  2  Continue cefazolin 1 g q 12 hours IV through today  Tomorrow begin cephalexin 500 mg q i d  In a m  x 1 week  Antibiotics:   1  Cefazolin 1 g q 12 hours IV, day 7 total antibiotic Rx to be followed tomorrow by cephalexin 500 mg q i d  Subjective:  Has less pain in both legs and back  Denies fevers, chills, or sweats  Denies nausea, vomiting, or diarrhea  Objective:  Vitals:  Temp:  [98 1 °F (36 7 °C)-98 6 °F (37 °C)] 98 2 °F (36 8 °C)  HR:  [] 88  Resp:  [16-20] 16  BP: (140-158)/(62-71) 150/71  SpO2:  [90 %-96 %] 96 %  Temp (24hrs), Av 3 °F (36 8 °C), Min:98 1 °F (36 7 °C), Max:98 6 °F (37 °C)  Current: Temperature: 98 2 °F (36 8 °C)    Physical Exam:     General Appearance:  Alert, chronically ill-appearing nontoxic, no acute distress  Throat: Oropharynx moist without lesions  Lips, mucosa, and tongue normal   Neck: Supple, symmetrical, trachea midline, no adenopathy,  no tenderness/mass/nodules   Lungs:   Hyper resonant few diffuse rhonchi, decreased respiratory expansion    Heart:  Regular rate and rhythm, S1, S2 normal, no murmur, rub or gallop   Abdomen:   Soft, non-tender, markedly protuberant non-distended, positive bowel sounds  No masses, no organomegaly    No CVA tenderness   Extremities: Lower extremity stasis changes   Skin: As above  Invasive Devices  Report    Peripheral Intravenous Line            Peripheral IV 12/15/21 Dorsal (posterior); Right Forearm 41 days    Peripheral IV 01/22/22 Distal;Right;Ventral (anterior) Wrist 3 days                Labs, Imaging, & Other studies:   All pertinent labs were personally reviewed  Results from last 7 days   Lab Units 01/25/22  0928 01/24/22  0434 01/23/22  0646   WBC Thousand/uL 11 25* 11 86* 17 37*   HEMOGLOBIN g/dL 9 8* 9 9* 10 4*   PLATELETS Thousands/uL 272 195 181     Results from last 7 days   Lab Units 01/25/22  0431 01/24/22  0434 01/24/22  0434 01/23/22  0646 01/23/22  0646 01/21/22  1117 01/21/22  0018   SODIUM mmol/L 139  --  136  --  134*   < > 130*   POTASSIUM mmol/L 3 4*   < > 3 6   < > 3 7   < > 3 8   CHLORIDE mmol/L 106   < > 104   < > 102   < > 93*   CO2 mmol/L 26   < > 26   < > 24   < > 27   BUN mg/dL 46*   < > 60*   < > 66*   < > 32*   CREATININE mg/dL 1 69*   < > 2 12*   < > 2 68*   < > 2 10*   EGFR ml/min/1 73sq m 39   < > 29   < > 22   < > 30   CALCIUM mg/dL 8 1*   < > 8 0*   < > 8 4   < > 9 7   AST U/L  --   --   --   --   --   --  24   ALT U/L  --   --   --   --   --   --  13   ALK PHOS U/L  --   --   --   --   --   --  139*    < > = values in this interval not displayed       Results from last 7 days   Lab Units 01/21/22 0227 01/21/22 0226   BLOOD CULTURE   --  Staphylococcus coagulase negative*  Staphylococcus coagulase negative*  Globicatella sanguinis*  Klebsiella pneumoniae*   GRAM STAIN RESULT   --  Gram positive cocci in clusters*  Gram positive cocci in chains*  Gram negative rods*   URINE CULTURE  >100,000 cfu/ml Klebsiella pneumoniae*  --

## 2022-01-26 NOTE — PLAN OF CARE
Problem: PAIN - ADULT  Goal: Verbalizes/displays adequate comfort level or baseline comfort level  Description: Interventions:  - Encourage patient to monitor pain and request assistance  - Assess pain using appropriate pain scale  - Administer analgesics based on type and severity of pain and evaluate response  - Implement non-pharmacological measures as appropriate and evaluate response  - Consider cultural and social influences on pain and pain management  - Notify physician/advanced practitioner if interventions unsuccessful or patient reports new pain  Outcome: Progressing     Problem: SAFETY ADULT  Goal: Maintain or return to baseline ADL function  Description: INTERVENTIONS:  -  Assess patient's ability to carry out ADLs; assess patient's baseline for ADL function and identify physical deficits which impact ability to perform ADLs (bathing, care of mouth/teeth, toileting, grooming, dressing, etc )  - Assess/evaluate cause of self-care deficits   - Assess range of motion  - Assess patient's mobility; develop plan if impaired  - Assess patient's need for assistive devices and provide as appropriate  - Encourage maximum independence but intervene and supervise when necessary  - Involve family in performance of ADLs  - Assess for home care needs following discharge   - Consider OT consult to assist with ADL evaluation and planning for discharge  - Provide patient education as appropriate  Outcome: Progressing  Goal: Maintains/Returns to pre admission functional level  Description: INTERVENTIONS:  - Perform BMAT or MOVE assessment daily    - Set and communicate daily mobility goal to care team and patient/family/caregiver  - Collaborate with rehabilitation services on mobility goals if consulted  - Perform Range of Motion 3 times a day  - Reposition patient every 2hours    - Dangle patient 3 times a day  - Stand patient3 times a day  - Ambulate patient 2times a day  - Out of bed to chair 2 times a day   - Out of bed for meals 3times a day  - Out of bed for toileting  - Record patient progress and toleration of activity level   Outcome: Progressing  Goal: Patient will remain free of falls  Description: INTERVENTIONS:  - Educate patient/family on patient safety including physical limitations  - Instruct patient to call for assistance with activity   - Consult OT/PT to assist with strengthening/mobility   - Keep Call bell within reach  - Keep bed low and locked with side rails adjusted as appropriate  - Keep care items and personal belongings within reach  - Initiate and maintain comfort rounds  - Make Fall Risk Sign visible to staff  - Offer Toileting every Hours, in advance of need  - Initiate/Maintain alarm  - Obtain necessary fall risk management equipment:   - Apply yellow socks and bracelet for high fall risk patients  - Consider moving patient to room near nurses station  Outcome: Progressing     Problem: INFECTION - ADULT  Goal: Absence or prevention of progression during hospitalization  Description: INTERVENTIONS:  - Assess and monitor for signs and symptoms of infection  - Monitor lab/diagnostic results  - Monitor all insertion sites, i e  indwelling lines, tubes, and drains  - Monitor endotracheal if appropriate and nasal secretions for changes in amount and color  - [place] appropriate cooling/warming therapies per order  - Administer medications as ordered  - Instruct and encourage patient and family to use good hand hygiene technique  - Identify and instruct in appropriate isolation precautions for identified infection/condition  Outcome: Progressing  Goal: Absence of fever/infection during neutropenic period  Description: INTERVENTIONS:  - Monitor WBC    Outcome: Progressing

## 2022-01-26 NOTE — CASE MANAGEMENT
Case Management Progress Note    Patient name Jose Rafael Vizcarra  Location [facility] 603/[facility] 565-81 MRN [id number]  : 1945 Date 2022       LOS (days): 5  Geometric Mean LOS (GMLOS) (days): 4 80  Days to GMLOS:-0 5        OBJECTIVE:        Current admission status: Inpatient  Preferred Pharmacy:   [address] [place] [address], [address] [place] [address]  Phone: [phone] Fax: [phone]    Primary Care Provider: Agus Cowan MD    Primary Insurance: [facility]  Secondary Insurance:     PROGRESS NOTE: TC from son, Marylen Mini, who states that he is unsure if patient was vaccinated for covid  TC to Dr Eulalio aMyo office, spoke with Fabienne Sanford, who states that patient has no covid vaccines on record    Message sent via [facility] to facilities

## 2022-01-26 NOTE — ASSESSMENT & PLAN NOTE
Suspect in setting of hyperglycemia, dehydration   Suspect some element of chronicity   · IVF as above  · Recent sodium 141

## 2022-01-27 PROBLEM — N39.0 UTI (URINARY TRACT INFECTION): Status: ACTIVE | Noted: 2022-01-27

## 2022-01-27 LAB
ANION GAP SERPL CALCULATED.3IONS-SCNC: 4 MMOL/L (ref 4–13)
BUN SERPL-MCNC: 38 MG/DL (ref 5–25)
CALCIUM SERPL-MCNC: 8.8 MG/DL (ref 8.3–10.1)
CHLORIDE SERPL-SCNC: 103 MMOL/L (ref 100–108)
CO2 SERPL-SCNC: 31 MMOL/L (ref 21–32)
CREAT SERPL-MCNC: 1.43 MG/DL (ref 0.6–1.3)
GFR SERPL CREATININE-BSD FRML MDRD: 47 ML/MIN/1.73SQ M
GLUCOSE SERPL-MCNC: 155 MG/DL (ref 65–140)
GLUCOSE SERPL-MCNC: 216 MG/DL (ref 65–140)
GLUCOSE SERPL-MCNC: 237 MG/DL (ref 65–140)
GLUCOSE SERPL-MCNC: 94 MG/DL (ref 65–140)
GLUCOSE SERPL-MCNC: 98 MG/DL (ref 65–140)
POTASSIUM SERPL-SCNC: 4.3 MMOL/L (ref 3.5–5.3)
SODIUM SERPL-SCNC: 138 MMOL/L (ref 136–145)

## 2022-01-27 PROCEDURE — 99232 SBSQ HOSP IP/OBS MODERATE 35: CPT | Performed by: INTERNAL MEDICINE

## 2022-01-27 PROCEDURE — 80048 BASIC METABOLIC PNL TOTAL CA: CPT | Performed by: INTERNAL MEDICINE

## 2022-01-27 PROCEDURE — 99231 SBSQ HOSP IP/OBS SF/LOW 25: CPT | Performed by: INTERNAL MEDICINE

## 2022-01-27 PROCEDURE — 82948 REAGENT STRIP/BLOOD GLUCOSE: CPT

## 2022-01-27 RX ORDER — PRAVASTATIN SODIUM 80 MG/1
80 TABLET ORAL
Status: DISCONTINUED | OUTPATIENT
Start: 2022-01-27 | End: 2022-01-27

## 2022-01-27 RX ORDER — FERROUS SULFATE 325(65) MG
325 TABLET ORAL
Status: DISCONTINUED | OUTPATIENT
Start: 2022-01-27 | End: 2022-01-30 | Stop reason: HOSPADM

## 2022-01-27 RX ADMIN — CYCLOBENZAPRINE HYDROCHLORIDE 10 MG: 10 TABLET, FILM COATED ORAL at 21:31

## 2022-01-27 RX ADMIN — CEPHALEXIN 500 MG: 500 CAPSULE ORAL at 21:31

## 2022-01-27 RX ADMIN — HEPARIN SODIUM 5000 UNITS: 5000 INJECTION INTRAVENOUS; SUBCUTANEOUS at 13:19

## 2022-01-27 RX ADMIN — OXYCODONE HYDROCHLORIDE AND ACETAMINOPHEN 1.5 TABLET: 5; 325 TABLET ORAL at 17:32

## 2022-01-27 RX ADMIN — TIOTROPIUM BROMIDE 18 MCG: 18 CAPSULE ORAL; RESPIRATORY (INHALATION) at 13:19

## 2022-01-27 RX ADMIN — CEPHALEXIN 500 MG: 500 CAPSULE ORAL at 08:46

## 2022-01-27 RX ADMIN — INSULIN LISPRO 4 UNITS: 100 INJECTION, SOLUTION INTRAVENOUS; SUBCUTANEOUS at 17:25

## 2022-01-27 RX ADMIN — CYCLOBENZAPRINE HYDROCHLORIDE 10 MG: 10 TABLET, FILM COATED ORAL at 08:46

## 2022-01-27 RX ADMIN — FERROUS SULFATE TAB 325 MG (65 MG ELEMENTAL FE) 325 MG: 325 (65 FE) TAB at 17:34

## 2022-01-27 RX ADMIN — OXYCODONE HYDROCHLORIDE AND ACETAMINOPHEN 1 TABLET: 5; 325 TABLET ORAL at 08:51

## 2022-01-27 RX ADMIN — METOPROLOL TARTRATE 50 MG: 50 TABLET, FILM COATED ORAL at 08:46

## 2022-01-27 RX ADMIN — INSULIN LISPRO 2 UNITS: 100 INJECTION, SOLUTION INTRAVENOUS; SUBCUTANEOUS at 21:34

## 2022-01-27 RX ADMIN — HEPARIN SODIUM 5000 UNITS: 5000 INJECTION INTRAVENOUS; SUBCUTANEOUS at 21:31

## 2022-01-27 RX ADMIN — CEPHALEXIN 500 MG: 500 CAPSULE ORAL at 13:19

## 2022-01-27 RX ADMIN — CYCLOBENZAPRINE HYDROCHLORIDE 10 MG: 10 TABLET, FILM COATED ORAL at 17:25

## 2022-01-27 RX ADMIN — INSULIN GLARGINE 15 UNITS: 100 INJECTION, SOLUTION SUBCUTANEOUS at 21:31

## 2022-01-27 RX ADMIN — HEPARIN SODIUM 5000 UNITS: 5000 INJECTION INTRAVENOUS; SUBCUTANEOUS at 06:02

## 2022-01-27 RX ADMIN — INSULIN LISPRO 2 UNITS: 100 INJECTION, SOLUTION INTRAVENOUS; SUBCUTANEOUS at 13:19

## 2022-01-27 RX ADMIN — FLUTICASONE FUROATE AND VILANTEROL TRIFENATATE 1 PUFF: 100; 25 POWDER RESPIRATORY (INHALATION) at 13:19

## 2022-01-27 RX ADMIN — CEPHALEXIN 500 MG: 500 CAPSULE ORAL at 17:25

## 2022-01-27 NOTE — ASSESSMENT & PLAN NOTE
· On Simvastatin 40 mg at home  · Begin equivalent Pravastatin on 1/26 if repeat CK normal (was 578 on admission)

## 2022-01-27 NOTE — PROGRESS NOTES
[address] [facility]  Progress Note - Michelle Singleton Sr  1945, 78 y o  male MRN: [id number]  Unit/Bed#: [facility] 603-01 Encounter: [id number]  Primary Care Provider: Dipika Pham MD   Date and time admitted to hospital: 1/20/2022 11:50 PM    * Sepsis [facility])  Assessment & Plan  · POA with tachycardia, leucocytosis  · Presented to the ED on 1/21/22 after he fell out of bed due to weakness  · Noted to have Klebsiella UTI and bacteremia  · Received Ceftriaxone and Cefepime initially - on cefazolin from 1/23 and changed to Cephalexin from 1/26  · ID following - plan to continue Cephalexin for total 7 days  · ID input appreciated      UTI (urinary tract infection)  Assessment & Plan  · Plan as above    Bacteremia  Assessment & Plan  · 1 out of 2 blood cultures from admission with Klebsiella pneumoniae - from UTI - sensitive to Cefazolin - antibiotics as above  · Second blood culture with coagulase negative Staphylococcus - contaminant    Acute kidney injury superimposed on chronic kidney disease-3 [facility])  Assessment & Plan  Lab Results   Component Value Date    EGFR 47 01/27/2022    EGFR 38 01/26/2022    EGFR 39 01/25/2022    CREATININE 1 43 (H) 01/27/2022    CREATININE 1 70 (H) 01/26/2022    CREATININE 1 69 (H) 01/25/2022   POA with creatinine 2 10, baseline around 1 4 to 1 6 (due to DM and HTN,)  · Due to UTI/bacteremia causing ATN  · Renal US - no hydronephrosis on the left, right with mild prominence of the renal pelvis with no calyceal dilation similar to June 18, 2018  · Diuretics had been held then received 1 dose of Torsemide 10 mg on 1/26  · Creatinine improved to 1 43 today  · Avoid nephrotoxic medications and hypotension  · Nephrology following - diuretics held today due to large amt of diuresis and contraction alkalosis  · Nephrology input appreciated  · Monitor BMP  · Trend PVR   Had been recommended Tamsulosin as outpatient but last refilled 60 day supply in mid Oct          Anemia, iron deficiency  Assessment & Plan  · Drop in Hb from 12 2 on 1/21 to 9 8 on 1/25  · No overt bleeding  · Iron studies suggestive of iron deficiency anemia  · F/u hemoccult  · Begun on FeSO4  · IV iron after infection treated    COPD, severe ([address] )  Assessment & Plan  Not in acute exacerbation  · Home Trelegy nonformulary - replaced with equivalent Imelda [employer], [employer]ir[employer]    Essential hypertension  Assessment & Plan  · Home medication metoprolol tartrate continued at increased dose(50 mg BID) due to tachycardia   · Diuretics held due to MARANDA  · BP acceptable  · Monitor BP    Fracture of navicular bone of left foot  Assessment & Plan  Recent, December 2021  · Seen by ortho, was WBAT LLE CAM boot  · Recommended outpatient f/u 4 weeks which he has not done   Has not been wearing boot  · Team spoke with ortho on 1/21 recommended to check xray  · Xray shows healing fracture with stable alignment  · Will recommend continued WBAT and outpatient ortho f/u    Type 2 diabetes mellitus with hyperglycemia, with long-term current use of insulin [facility])  Assessment & Plan  Lab Results   Component Value Date    HGBA1C 9 2 (H) 04/14/2021       Recent Labs     01/26/22  2056 01/26/22  2204 01/27/22  0745 01/27/22  1121   POCGLU 182* 189* 94 155*       Blood Sugar Average: Last 72 hrs:  · (P) 120 [id number]   · A1C poorly controlled, with hyperglycemia on admission  · Home regimen: U500 45 units before breakfast and lunch  · Was switched to basal/bolus on admission  · SSI with Accu-Cheks, carb controlled diet  · Hypoglycemia protocol  · Had persistent hypoglycemia hence Lantus decreased to 15 hs and Humalog to 3 TID on 1/26  · Blood sugars now acceptable  · Monitor blood sugars and adjust insulin accordingly      Chronic respiratory failure with hypoxia (HCC)  Assessment & Plan  On 3 L NC continuously at baseline  · Continue supplemental oxygen and titrate as needed to maintain SaO2 88-94%  · CXR with stable findings Chronic diastolic heart failure (HCC)  Assessment & Plan  Wt Readings from Last 3 Encounters:   22 112 kg (246 lb 12 8 oz)   21 104 kg (229 lb 11 5 oz)   21 111 kg (244 lb 12 8 oz)     Recent admission with acute on chronic heart failure  Appears fairly euvolemic at this time despite imaging showing loculated pleural effusion on admission  · Monitor daily weights, I/O, volume status  · Low-sodium, fluid-restricted diet; patient admitted to some recent dietary indiscretion thus counseling provided  · Diuretic plan per MARANDA        Adenocarcinoma of lung [facility])  [address] with radiation oncology as outpatient, most recent office note reviewed from 2021  · He had CTA study on 2021--revealed no evidence of any disease  · Recommended to have repeat CT chest 2022 and f/u as outpatienet  · Continue to recommend outpatient f/u    HLD (hyperlipidemia)  Assessment & Plan  · On Simvastatin 40 mg at home  · Begin equivalent Pravastatin on  if repeat CK normal (was 578 on admission)    VTE Pharmacologic Prophylaxis: VTE Score: 5 High Risk (Score >/= 5) - Pharmacological DVT Prophylaxis Ordered: heparin  Sequential Compression Devices Ordered  Patient Centered Rounds: Discussed with RN    Education and Discussions with Family / Patient: Attempted to update  (son) via phone  Left voicemail  Time Spent for Care: 20 minutes  More than 50% of total time spent on counseling and coordination of care as described above  Current Length of Stay: 6 day(s)  Current Patient Status: Inpatient   Certification Statement: The patient will continue to require additional inpatient hospital stay due to awaiting placement    Code Status: Level 1 - Full Code    Subjective:   No fever  No nausea, vomiting or diarrhea      Objective:     Vitals:   Temp (24hrs), Av 5 °F (36 9 °C), Min:98 4 °F (36 9 °C), Max:98 5 °F (36 9 °C)    Temp:  [98 4 °F (36 9 °C)-98 5 °F (36 9 °C)] 98 4 °F (36 9 °C)  HR:  [89-94] 94  Resp:  [16-17] 16  BP: (127-132)/(49-58) 128/52  SpO2:  [97 %-99 %] 97 %  Body mass index is 33 47 kg/m²  Physical Exam:   Physical Exam  Vitals reviewed  HENT:      Head: Normocephalic  Nose: Nose normal       Mouth/Throat:      Mouth: Mucous membranes are moist    Eyes:      Extraocular Movements: Extraocular movements intact  Cardiovascular:      Rate and Rhythm: Normal rate and regular rhythm  Pulmonary:      Effort: Pulmonary effort is normal  No respiratory distress  Breath sounds: Normal breath sounds  No wheezing  Abdominal:      General: Bowel sounds are normal       Palpations: Abdomen is soft  Musculoskeletal:      Cervical back: Neck supple  Right lower leg: Edema present  Left lower leg: Edema present  Skin:     General: Skin is warm and dry  Neurological:      General: No focal deficit present  Mental Status: He is alert  Psychiatric:         Mood and Affect: Mood normal           Additional Data:     Labs:  Results from last 7 days   Lab Units 01/25/22  0928 01/21/22  1117 01/21/22  0018   WBC Thousand/uL 11 25*   < > 23 62*   HEMOGLOBIN g/dL 9 8*   < > 12 2   HEMATOCRIT % 30 9*   < > 37 7   PLATELETS Thousands/uL 272   < > 155   BANDS PCT %  --   --  17*   NEUTROS PCT % 83*   < >  --    LYMPHS PCT % 6*   < >  --    LYMPHO PCT %  --   --  2*   MONOS PCT % 9   < >  --    MONO PCT %  --   --  8   EOS PCT % 1   < > 0    < > = values in this interval not displayed       Results from last 7 days   Lab Units 01/27/22  0444 01/21/22  1117 01/21/22  0018   SODIUM mmol/L 138   < > 130*   POTASSIUM mmol/L 4 3   < > 3 8   CHLORIDE mmol/L 103   < > 93*   CO2 mmol/L 31   < > 27   BUN mg/dL 38*   < > 32*   CREATININE mg/dL 1 43*   < > 2 10*   ANION GAP mmol/L 4   < > 10   CALCIUM mg/dL 8 8   < > 9 7   ALBUMIN g/dL  --   --  2 5*   TOTAL BILIRUBIN mg/dL  --   --  1 24*   ALK PHOS U/L  --   --  139*   ALT U/L  --   --  13   AST U/L  --   --  24   GLUCOSE RANDOM mg/dL 98   < > 371*    < > = values in this interval not displayed  Results from last 7 days   Lab Units 01/27/22  1121 01/27/22  0745 01/26/22  2204 01/26/22  2056 01/26/22  1647 01/26/22  1150 01/26/22  0807 01/26/22  0656 01/25/22  2107 01/25/22  1704 01/25/22  1113 01/25/22  0857   POC GLUCOSE mg/dl 155* 94 189* 182* 125 109 88 47* 176* 161* 169* 153*         Results from last 7 days   Lab Units 01/24/22  0737 01/24/22  0434 01/22/22  1023 01/21/22  0444 01/21/22  0126   LACTIC ACID mmol/L  --   --   --  1 7 2 3*   PROCALCITONIN ng/ml 9 39* 6 86* 17 47*  --  11 61*       Lines/Drains:  Invasive Devices  Report    Peripheral Intravenous Line            Peripheral IV 12/15/21 Dorsal (posterior); Right Forearm 43 days    Peripheral IV 01/26/22 Right Antecubital 1 day                Recent Cultures (last 7 days):   Results from last 7 days   Lab Units 01/21/22  0227 01/21/22  0226   BLOOD CULTURE   --  Staphylococcus coagulase negative*  Staphylococcus coagulase negative*  Globicatella sanguinis*  Klebsiella pneumoniae*   GRAM STAIN RESULT   --  Gram positive cocci in clusters*  Gram positive cocci in chains*  Gram negative rods*   URINE CULTURE  >100,000 cfu/ml Klebsiella pneumoniae*  --        Last 24 Hours Medication List:   Current Facility-Administered Medications   Medication Dose Route Frequency Provider Last Rate    cephalexin  500 mg Oral 4x Daily Jong Trejo MD      cyclobenzaprine  10 mg Oral TID Ivangelique Hart DO      ferrous sulfate  325 mg Oral Daily With Breakfast Uriah Robert MD      fluticasone-vilanterol  1 puff Inhalation Daily Uriah Robert MD      heparin (porcine)  5,000 Units Subcutaneous [facility] Ivangelique Hart DO      insulin glargine  15 Units Subcutaneous HS ElaineYOLANDE Kraus      insulin lispro  1-6 Units Subcutaneous HS Ivangelique Hart, DO      insulin lispro  2-12 Units Subcutaneous TID AC Ivangelique Hart DO  insulin lispro  3 Units Subcutaneous TID With Meals YOLANDE Headley      metoprolol tartrate  50 mg Oral BID Freedom aZpata PA-C      oxyCODONE-acetaminophen  1 5 tablet Oral Q4H PRN Aime Leggett DO      tiotropium  18 mcg Inhalation Daily Laron Mata MD          Today, Patient Was Seen By: Laron Mata MD    **Please Note: This note may have been constructed using a voice recognition system  **

## 2022-01-27 NOTE — ASSESSMENT & PLAN NOTE
Wt Readings from Last 3 Encounters:   01/25/22 112 kg (246 lb 12 8 oz)   12/16/21 104 kg (229 lb 11 5 oz)   07/25/21 111 kg (244 lb 12 8 oz)     Recent admission with acute on chronic heart failure  Appears fairly euvolemic at this time despite imaging showing loculated pleural effusion on admission     · Monitor daily weights, I/O, volume status  · Low-sodium, fluid-restricted diet; patient admitted to some recent dietary indiscretion thus counseling provided  · Diuretic plan per MARANDA

## 2022-01-27 NOTE — PLAN OF CARE
Problem: MOBILITY - ADULT  Goal: Maintain or return to baseline ADL function  Description: INTERVENTIONS:  -  Assess patient's ability to carry out ADLs; assess patient's baseline for ADL function and identify physical deficits which impact ability to perform ADLs (bathing, care of mouth/teeth, toileting, grooming, dressing, etc )  - Assess/evaluate cause of self-care deficits   - Assess range of motion  - Assess patient's mobility; develop plan if impaired  - Assess patient's need for assistive devices and provide as appropriate  - Encourage maximum independence but intervene and supervise when necessary  - Involve family in performance of ADLs  - Assess for home care needs following discharge   - Consider OT consult to assist with ADL evaluation and planning for discharge  - Provide patient education as appropriate  Outcome: Progressing  Goal: Maintains/Returns to pre admission functional level  Description: INTERVENTIONS:  - Perform BMAT or MOVE assessment daily    - Set and communicate daily mobility goal to care team and patient/family/caregiver  - Collaborate with rehabilitation services on mobility goals if consulted  - Perform Range of Motion  times a day  - Reposition patient every  hours    - Dangle patient  times a day  - Stand patient  times a day  - Ambulate patient  times a day  - Out of bed to chair  times a day   - Out of bed for meals  times a day  - Out of bed for toileting  - Record patient progress and toleration of activity level   Outcome: Progressing     Problem: PAIN - ADULT  Goal: Verbalizes/displays adequate comfort level or baseline comfort level  Description: Interventions:  - Encourage patient to monitor pain and request assistance  - Assess pain using appropriate pain scale  - Administer analgesics based on type and severity of pain and evaluate response  - Implement non-pharmacological measures as appropriate and evaluate response  - Consider cultural and social influences on pain and pain management  - Notify physician/advanced practitioner if interventions unsuccessful or patient reports new pain  Outcome: Progressing     Problem: INFECTION - ADULT  Goal: Absence or prevention of progression during hospitalization  Description: INTERVENTIONS:  - Assess and monitor for signs and symptoms of infection  - Monitor lab/diagnostic results  - Monitor all insertion sites, i e  indwelling lines, tubes, and drains  - Monitor endotracheal if appropriate and nasal secretions for changes in amount and color  - [place] appropriate cooling/warming therapies per order  - Administer medications as ordered  - Instruct and encourage patient and family to use good hand hygiene technique  - Identify and instruct in appropriate isolation precautions for identified infection/condition  Outcome: Progressing  Goal: Absence of fever/infection during neutropenic period  Description: INTERVENTIONS:  - Monitor WBC    Outcome: Progressing     Problem: SAFETY ADULT  Goal: Maintain or return to baseline ADL function  Description: INTERVENTIONS:  -  Assess patient's ability to carry out ADLs; assess patient's baseline for ADL function and identify physical deficits which impact ability to perform ADLs (bathing, care of mouth/teeth, toileting, grooming, dressing, etc )  - Assess/evaluate cause of self-care deficits   - Assess range of motion  - Assess patient's mobility; develop plan if impaired  - Assess patient's need for assistive devices and provide as appropriate  - Encourage maximum independence but intervene and supervise when necessary  - Involve family in performance of ADLs  - Assess for home care needs following discharge   - Consider OT consult to assist with ADL evaluation and planning for discharge  - Provide patient education as appropriate  Outcome: Progressing  Goal: Maintains/Returns to pre admission functional level  Description: INTERVENTIONS:  - Perform BMAT or MOVE assessment daily    - Set and communicate daily mobility goal to care team and patient/family/caregiver  - Collaborate with rehabilitation services on mobility goals if consulted  - Perform Range of Motion  times a day  - Reposition patient every  hours    - Dangle patient  times a day  - Stand patient  times a day  - Ambulate patient  times a day  - Out of bed to chair times a day   - Out of bed for meals  times a day  - Out of bed for toileting  - Record patient progress and toleration of activity level   Outcome: Progressing  Goal: Patient will remain free of falls  Description: INTERVENTIONS:  - Educate patient/family on patient safety including physical limitations  - Instruct patient to call for assistance with activity   - Consult OT/PT to assist with strengthening/mobility   - Keep Call bell within reach  - Keep bed low and locked with side rails adjusted as appropriate  - Keep care items and personal belongings within reach  - Initiate and maintain comfort rounds  - Make Fall Risk Sign visible to staff  - Offer Toileting every  Hours, in advance of need  - Initiate/Maintain alarm  - Obtain necessary fall risk management equipment:   - Apply yellow socks and bracelet for high fall risk patients  - Consider moving patient to room near nurses station  Outcome: Progressing     Problem: DISCHARGE PLANNING  Goal: Discharge to home or other facility with appropriate resources  Description: INTERVENTIONS:  - Identify barriers to discharge w/patient and caregiver  - Arrange for needed discharge resources and transportation as appropriate  - Identify discharge learning needs (meds, wound care, etc )  - Arrange for interpretive services to assist at discharge as needed  - Refer to Case Management Department for coordinating discharge planning if the patient needs post-hospital services based on physician/advanced practitioner order or complex needs related to functional status, cognitive ability, or social support system  Outcome: Progressing Problem: Knowledge Deficit  Goal: Patient/family/caregiver demonstrates understanding of disease process, treatment plan, medications, and discharge instructions  Description: Complete learning assessment and assess knowledge base    Interventions:  - Provide teaching at level of understanding  - Provide teaching via preferred learning methods  Outcome: Progressing     Problem: Potential for Falls  Goal: Patient will remain free of falls  Description: INTERVENTIONS:  - Educate patient/family on patient safety including physical limitations  - Instruct patient to call for assistance with activity   - Consult OT/PT to assist with strengthening/mobility   - Keep Call bell within reach  - Keep bed low and locked with side rails adjusted as appropriate  - Keep care items and personal belongings within reach  - Initiate and maintain comfort rounds  - Make Fall Risk Sign visible to staff  - Offer Toileting every  Hours, in advance of need  - Initiate/Maintain alarm  - Obtain necessary fall risk management equipment:   - Apply yellow socks and bracelet for high fall risk patients  - Consider moving patient to room near nurses station  Outcome: Progressing

## 2022-01-27 NOTE — ASSESSMENT & PLAN NOTE
· Home medication metoprolol tartrate continued at increased dose(50 mg BID) due to tachycardia   · Diuretics held due to MARANDA  · BP acceptable  · Monitor BP

## 2022-01-27 NOTE — ASSESSMENT & PLAN NOTE
· POA with tachycardia, leucocytosis  · Presented to the ED on 1/21/22 after he fell out of bed due to weakness  · Noted to have Klebsiella UTI and bacteremia  · Received Ceftriaxone and Cefepime initially - on cefazolin from 1/23 and changed to Cephalexin from 1/26  · ID following - plan to continue Cephalexin for total 7 days  · ID input appreciated

## 2022-01-27 NOTE — ASSESSMENT & PLAN NOTE
· 1 out of 2 blood cultures from admission with Klebsiella pneumoniae - from UTI - sensitive to Cefazolin - antibiotics as above  · Second blood culture with coagulase negative Staphylococcus - contaminant

## 2022-01-27 NOTE — ASSESSMENT & PLAN NOTE
Recent, December 2021  · Seen by ortho, was WBAT LLE CAM boot  · Recommended outpatient f/u 4 weeks which he has not done   Has not been wearing boot  · Team spoke with ortho on 1/21 recommended to check xray  · Xray shows healing fracture with stable alignment  · Will recommend continued WBAT and outpatient ortho f/u

## 2022-01-27 NOTE — PROGRESS NOTES
Progress Note - Infectious Disease   Jude Friend Sr  78 y o  male MRN: [id number]  Unit/Bed#: [facility] 603-01 Encounter: [id number]      Impression:  1  Klebsiella pneumoniae urosepsis with MARANDA on CKD  Gram-positive cocci contaminant  2  COPD with Chronic respiratory failure with hypoxia  3  Essential hypertension  4  Chronic diastolic CHF   5  Type 2 DM  6  History of prostate carcinoma     Recommendations:   Patient is afebrile with WBC count 11,250 when last taken  1  One blood and urine culture showing Klebsiella pneumoniae that is cefazolin susceptible  The other blood culture showing a coagulase-negative Staphylococcus which is a contaminant  2  Continue cephalexin 500 mg q i d  Manjit Heckler x 1 week  Antibiotics:   1  Cephalexin 500 mg q i d p o , Day 2 of 7    Subjective:  Some pain in both legs and back  Denies fevers, chills, or sweats  Denies nausea, vomiting, or diarrhea  Objective:  Vitals:  Temp:  [98 4 °F (36 9 °C)-98 5 °F (36 9 °C)] 98 5 °F (36 9 °C)  HR:  [74-94] 74  Resp:  [16-17] 16  BP: (119-132)/(43-58) 119/43  SpO2:  [97 %-99 %] 99 %  Temp (24hrs), Av 5 °F (36 9 °C), Min:98 4 °F (36 9 °C), Max:98 5 °F (36 9 °C)  Current: Temperature: 98 5 °F (36 9 °C)    Physical Exam:     General Appearance:  Alert, chronically ill-appearing nontoxic, no acute distress  Throat: Oropharynx moist without lesions  Lips, mucosa, and tongue normal   Neck: Supple, symmetrical, trachea midline, no adenopathy,  no tenderness/mass/nodules   Lungs:   Hyper resonant few diffuse rhonchi, decreased respiratory expansion    Heart:  Regular rate and rhythm, S1, S2 normal, no murmur, rub or gallop   Abdomen:   Soft, non-tender, markedly protuberant non-distended, positive bowel sounds  No masses, no organomegaly    No CVA tenderness   Extremities: Lower extremity stasis changes   Skin: As above           Invasive Devices  Report    Peripheral Intravenous Line            Peripheral IV 12/15/21 Dorsal (posterior); Right Forearm 43 days    Peripheral IV 01/26/22 Right Antecubital 1 day                Labs, Imaging, & Other studies:   All pertinent labs were personally reviewed  Results from last 7 days   Lab Units 01/25/22  0928 01/24/22  0434 01/23/22  0646   WBC Thousand/uL 11 25* 11 86* 17 37*   HEMOGLOBIN g/dL 9 8* 9 9* 10 4*   PLATELETS Thousands/uL 272 195 181     Results from last 7 days   Lab Units 01/27/22  0444 01/26/22  0459 01/26/22  0459 01/25/22  0431 01/25/22  0431 01/21/22  1117 01/21/22  0018   SODIUM mmol/L 138  --  141  --  139   < > 130*   POTASSIUM mmol/L 4 3   < > 4 2   < > 3 4*   < > 3 8   CHLORIDE mmol/L 103   < > 106   < > 106   < > 93*   CO2 mmol/L 31   < > 29   < > 26   < > 27   BUN mg/dL 38*   < > 44*   < > 46*   < > 32*   CREATININE mg/dL 1 43*   < > 1 70*   < > 1 69*   < > 2 10*   EGFR ml/min/1 73sq m 47   < > 38   < > 39   < > 30   CALCIUM mg/dL 8 8   < > 8 6   < > 8 1*   < > 9 7   AST U/L  --   --   --   --   --   --  24   ALT U/L  --   --   --   --   --   --  13   ALK PHOS U/L  --   --   --   --   --   --  139*    < > = values in this interval not displayed       Results from last 7 days   Lab Units 01/21/22 0227 01/21/22 0226   BLOOD CULTURE   --  Staphylococcus coagulase negative*  Staphylococcus coagulase negative*  Globicatella sanguinis*  Klebsiella pneumoniae*   GRAM STAIN RESULT   --  Gram positive cocci in clusters*  Gram positive cocci in chains*  Gram negative rods*   URINE CULTURE  >100,000 cfu/ml Klebsiella pneumoniae*  --

## 2022-01-27 NOTE — PROGRESS NOTES
Progress Note - Nephrology   Danna Wing  78 y o  male MRN: [id number]  Unit/Bed#: [facility] 603-01 Encounter: [id number]      Assessment / Plan:    24-year-old man with a history of stage III CKD with baseline creatinine of 1 4-1 6, hypertension, diabetes mellitus, COPD, CHF on chronic home oxygen  Admitted with a UTI  MARANDA and CKD stage 3  · Baseline creatinine of 1 4-1 6  · MARANDA secondary to urosepsis, bacteremia culminating in ATN  · Creatinine has improved and currently is 1 43  · Renal ultrasound without hydronephrosis on the left, right renal pelvis with prominence without any overt hydronephrosis  · Avoid nephrotoxins and relative hypotension  CKD  · Secondary to diabetes mellitus and hypertension  · Follows with Dr Federico Ace as an outpatient  Hyponatremia  · Resolved with hypotonic fluid administration    Other issues:  · Klebsiella bacteremia  · COPD  · Chronic diastolic CHF  · Type 2 diabetes mellitus  · Prostate carcinoma  · H/o Lung cancer  · Chronic respiratory failure with hypoxia and chronic home oxygen  · Anemia-hemoglobin has decreased from 12 2 to 9 9-increased RDW in decreased MCV  Further workup per primary team - normal B12 and folate/iron 28-iron sat 9%-ferritin 157    Plan:  · Hold diuretics today given significant diuresis the last 48 hours and development of contraction alkalosis  · Recheck BMP in a m  · Trend PVR - 94ml 1/26  · Monitor GFR and re-dose antibiotics as needed  · Would initiate oral iron, intravenous once infection cleared  Would also check stool guaiacs - per primary team        Subjective: The patient was seen and examined early this morning  He denied any shortness of breath, chest pain or pressure, abdominal pain, vomiting, diarrhea, sweats, chills, paroxysmal nocturnal dyspnea orthopnea  He felt as peripheral edema had done significantly improved  Objective:     Vitals: Blood pressure 128/52, pulse 94, temperature 98 4 °F (36 9 °C), resp   rate 16, height 6' (1 829 m), weight 112 kg (246 lb 12 8 oz), SpO2 97 %  ,Body mass index is 33 47 kg/m²  Temp (24hrs), Av 1 °F (36 7 °C), Min:97 5 °F (36 4 °C), Max:98 5 °F (36 9 °C)      Weight (last 2 days)     Date/Time Weight    22 0537 112 (246 8)                 Intake/Output Summary (Last 24 hours) at 2022 1151  Last data filed at 2022 0850  Gross per 24 hour   Intake 720 ml   Output 1794 ml   Net -1074 ml     I/O last 24 hours: In: 720 [P O :720]  Out: 2459 [Urine:2459]        Physical Exam    /52   Pulse 94   Temp 98 4 °F (36 9 °C)   Resp 16   Ht 6' (1 829 m)   Wt 112 kg (246 lb 12 8 oz)   SpO2 97%   BMI 33 47 kg/m²   Vital signs were reviewed  Constitutional:  The patient was awake, alert, pleasant, cooperative and in no apparent distress  Cardiovascular:  He has no overt jugular distention was noted, S1-S2, no pericardial friction rub S3 or appreciated, peripheral edema was slightly improved  Pulmonary:  Adequate inspiratory effort, no rales/rhonchi wheezing noted, clear to auscultation percussion  Abdominal/GI:  Soft, nontender, rebound or guarding was noted  Skin:  No hives noted            Invasive Devices  Report    Peripheral Intravenous Line            Peripheral IV 12/15/21 Dorsal (posterior); Right Forearm 43 days    Peripheral IV 22 Right Antecubital 1 day                Medications:    Scheduled Meds:  Current Facility-Administered Medications   Medication Dose Route Frequency Provider Last Rate    cephalexin  500 mg Oral 4x Daily Zada Saint, MD      cyclobenzaprine  10 mg Oral TID Shlomo Dominguez DO      fluticasone-vilanterol  1 puff Inhalation Daily Omi Gifford MD      heparin (porcine)  5,000 Units Subcutaneous [facility] Shlomo Dominguez, DO      insulin glargine  15 Units Subcutaneous HS YOLANDE Shaikh      insulin lispro  1-6 Units Subcutaneous HS Shlomo Dominguez, DO      insulin lispro  2-12 Units Subcutaneous TID  DO Keren Tate Courser insulin lispro  3 Units Subcutaneous TID With Meals Bruna YOLANDE Mcdaniel      metoprolol tartrate  50 mg Oral BID Ulises Gallegos PA-C      oxyCODONE-acetaminophen  1 5 tablet Oral Q4H PRN Maria Elena Nelson DO      tiotropium  18 mcg Inhalation Daily Grady Jorge MD         PRN Meds: oxyCODONE-acetaminophen    Continuous Infusions:         LAB RESULTS:      Results from last 7 days   Lab Units 01/27/22  0444 01/26/22  0459 01/25/22  0928 01/25/22  0431 01/24/22  0434 01/23/22  0646 01/22/22  1945 01/22/22  0457 01/21/22  1908 01/21/22  1117 01/21/22  0018 01/21/22  0018   WBC Thousand/uL  --   --  11 25*  --  11 86* 17 37*  --  19 98*  --  20 27*  --  23 62*   HEMOGLOBIN g/dL  --   --  9 8*  --  9 9* 10 4*  --  11 3*  --  11 5*  --  12 2   HEMATOCRIT %  --   --  30 9*  --  31 2* 33 1*  --  35 1*  --  35 6*  --  37 7   PLATELETS Thousands/uL  --   --  272  --  195 181  --  180  --  178  --  155   NEUTROS PCT %  --   --  83*  --  85* 85*  --   --   --  88*  --   --    LYMPHS PCT %  --   --  6*  --  5* 4*  --   --   --  3*  --   --    LYMPHO PCT %  --   --   --   --   --   --   --   --   --   --   --  2*   MONOS PCT %  --   --  9  --  8 9  --   --   --  8  --   --    MONO PCT %  --   --   --   --   --   --   --   --   --   --   --  8   EOS PCT %  --   --  1  --  1 1  --   --   --  0  --  0   POTASSIUM mmol/L 4 3 4 2  --  3 4* 3 6 3 7 3 6 3 7   < > 4 0   < > 3 8   CHLORIDE mmol/L 103 106  --  106 104 102 101 96*   < > 94*   < > 93*   CO2 mmol/L 31 29  --  26 26 24 27 23   < > 28   < > 27   BUN mg/dL 38* 44*  --  46* 60* 66* 64* 55*   < > 43*   < > 32*   CREATININE mg/dL 1 43* 1 70*  --  1 69* 2 12* 2 68* 2 80* 2 76*   < > 2 70*   < > 2 10*   CALCIUM mg/dL 8 8 8 6  --  8 1* 8 0* 8 4 9 7 8 2*   < > 8 9   < > 9 7   ALK PHOS U/L  --   --   --   --   --   --   --   --   --   --   --  139*   ALT U/L  --   --   --   --   --   --   --   --   --   --   --  13   AST U/L  --   --   --   --   --   --   --   --   --   -- --  24   MAGNESIUM mg/dL  --   --   --  2 5  --   --   --   --   --   --   --   --    PHOSPHORUS mg/dL  --   --   --  3 1  --   --   --   --   --   --   --   --     < > = values in this interval not displayed  CUTURES:  Lab Results   Component Value Date    BLOODCX Klebsiella pneumoniae (A) 01/21/2022    BLOODCX Staphylococcus coagulase negative (A) 01/21/2022    BLOODCX Staphylococcus coagulase negative (A) 01/21/2022    BLOODCX Globicatella sanguinis (A) 01/21/2022    BLOODCX No Growth After 5 Days  06/20/2018    BLOODCX No Growth After 5 Days  06/20/2018    BLOODCX No Growth After 5 Days  06/19/2018    URINECX >100,000 cfu/ml Klebsiella pneumoniae (A) 01/21/2022    URINECX >100,000 cfu/ml  09/11/2018    URINECX >100,000 cfu/ml Staphylococcus coagulase negative 12/03/2016                 PLEASE NOTE:  This encounter was completed utilizing the [employer] Direct Speech Voice Recognition Software  Grammatical errors, random word insertions, pronoun errors and incomplete sentences are occasional consequences of the system due to software limitations, ambient noise and hardware issues  These may be missed by proof reading prior to affixing electronic signature  Any questions or concerns about the content, text or information contained within the body of this dictation should be directly addressed to the physician for clarification  Please do not hesitate to call me directly if you have any any questions or concerns

## 2022-01-27 NOTE — ASSESSMENT & PLAN NOTE
· Drop in Hb from 12 2 on 1/21 to 9 8 on 1/25  · No overt bleeding  · Iron studies suggestive of iron deficiency anemia  · F/u hemoccult  · Begun on FeSO4  · IV iron after infection treated

## 2022-01-27 NOTE — ASSESSMENT & PLAN NOTE
Lab Results   Component Value Date    HGBA1C 9 2 (H) 04/14/2021       Recent Labs     01/26/22 2056 01/26/22  2204 01/27/22  0745 01/27/22  1121   POCGLU 182* 189* 94 155*       Blood Sugar Average: Last 72 hrs:  · (P) 120 [id number]   · A1C poorly controlled, with hyperglycemia on admission  · Home regimen: U500 45 units before breakfast and lunch  · Was switched to basal/bolus on admission  · SSI with Accu-Cheks, carb controlled diet  · Hypoglycemia protocol  · Had persistent hypoglycemia hence Lantus decreased to 15 hs and Humalog to 3 TID on 1/26  · Blood sugars now acceptable  · Monitor blood sugars and adjust insulin accordingly

## 2022-01-27 NOTE — ASSESSMENT & PLAN NOTE
Lab Results   Component Value Date    EGFR 47 01/27/2022    EGFR 38 01/26/2022    EGFR 39 01/25/2022    CREATININE 1 43 (H) 01/27/2022    CREATININE 1 70 (H) 01/26/2022    CREATININE 1 69 (H) 01/25/2022   POA with creatinine 2 10, baseline around 1 4 to 1 6 (due to DM and HTN,)  · Due to UTI/bacteremia causing ATN  · Renal US - no hydronephrosis on the left, right with mild prominence of the renal pelvis with no calyceal dilation similar to June 18, 2018  · Diuretics had been held then received 1 dose of Torsemide 10 mg on 1/26  · Creatinine improved to 1 43 today  · Avoid nephrotoxic medications and hypotension  · Nephrology following - diuretics held today due to large amt of diuresis and contraction alkalosis  · Nephrology input appreciated  · Monitor BMP  · Trend PVR   Had been recommended Tamsulosin as outpatient but last refilled 60 day supply in mid Oct

## 2022-01-27 NOTE — PLAN OF CARE
Problem: PAIN - ADULT  Goal: Verbalizes/displays adequate comfort level or baseline comfort level  Description: Interventions:  - Encourage patient to monitor pain and request assistance  - Assess pain using appropriate pain scale  - Administer analgesics based on type and severity of pain and evaluate response  - Implement non-pharmacological measures as appropriate and evaluate response  - Consider cultural and social influences on pain and pain management  - Notify physician/advanced practitioner if interventions unsuccessful or patient reports new pain  Outcome: Progressing     Problem: INFECTION - ADULT  Goal: Absence or prevention of progression during hospitalization  Description: INTERVENTIONS:  - Assess and monitor for signs and symptoms of infection  - Monitor lab/diagnostic results  - Monitor all insertion sites, i e  indwelling lines, tubes, and drains  - Monitor endotracheal if appropriate and nasal secretions for changes in amount and color  - [place] appropriate cooling/warming therapies per order  - Administer medications as ordered  - Instruct and encourage patient and family to use good hand hygiene technique  - Identify and instruct in appropriate isolation precautions for identified infection/condition  Outcome: Progressing  Goal: Absence of fever/infection during neutropenic period  Description: INTERVENTIONS:  - Monitor WBC    Outcome: Progressing

## 2022-01-27 NOTE — PHYSICAL THERAPY NOTE
Physical Therapy Cancellation Note    Attempted to see pt this PM   Pt seated EOB with lights off  Declined activity despite encouragement  Reports he has been able to walk to bathroom & is not using AD  Did not confirm this with nursing at that time  Will continue to follow and treat if pt is agreeable      Pineda Andujar, SOLO

## 2022-01-28 PROBLEM — Z85.46 HISTORY OF PROSTATE CANCER: Chronic | Status: ACTIVE | Noted: 2022-01-28

## 2022-01-28 LAB
ALBUMIN SERPL BCP-MCNC: 2 G/DL (ref 3.5–5)
ALP SERPL-CCNC: 151 U/L (ref 46–116)
ALT SERPL W P-5'-P-CCNC: 13 U/L (ref 12–78)
ANION GAP SERPL CALCULATED.3IONS-SCNC: 4 MMOL/L (ref 4–13)
AST SERPL W P-5'-P-CCNC: 11 U/L (ref 5–45)
BILIRUB SERPL-MCNC: 0.24 MG/DL (ref 0.2–1)
BUN SERPL-MCNC: 32 MG/DL (ref 5–25)
CALCIUM ALBUM COR SERPL-MCNC: 10.2 MG/DL (ref 8.3–10.1)
CALCIUM SERPL-MCNC: 8.6 MG/DL (ref 8.3–10.1)
CHLORIDE SERPL-SCNC: 102 MMOL/L (ref 100–108)
CK SERPL-CCNC: 21 U/L (ref 39–308)
CO2 SERPL-SCNC: 33 MMOL/L (ref 21–32)
CREAT SERPL-MCNC: 1.46 MG/DL (ref 0.6–1.3)
GFR SERPL CREATININE-BSD FRML MDRD: 46 ML/MIN/1.73SQ M
GLUCOSE SERPL-MCNC: 203 MG/DL (ref 65–140)
GLUCOSE SERPL-MCNC: 220 MG/DL (ref 65–140)
GLUCOSE SERPL-MCNC: 230 MG/DL (ref 65–140)
GLUCOSE SERPL-MCNC: 246 MG/DL (ref 65–140)
GLUCOSE SERPL-MCNC: 267 MG/DL (ref 65–140)
HCT VFR BLD AUTO: 32.5 % (ref 36.5–49.3)
HGB BLD-MCNC: 9.8 G/DL (ref 12–17)
POTASSIUM SERPL-SCNC: 4.8 MMOL/L (ref 3.5–5.3)
PROT SERPL-MCNC: 7.1 G/DL (ref 6.4–8.2)
SODIUM SERPL-SCNC: 139 MMOL/L (ref 136–145)

## 2022-01-28 PROCEDURE — 85018 HEMOGLOBIN: CPT | Performed by: INTERNAL MEDICINE

## 2022-01-28 PROCEDURE — 99232 SBSQ HOSP IP/OBS MODERATE 35: CPT | Performed by: INTERNAL MEDICINE

## 2022-01-28 PROCEDURE — 85014 HEMATOCRIT: CPT | Performed by: INTERNAL MEDICINE

## 2022-01-28 PROCEDURE — 82550 ASSAY OF CK (CPK): CPT | Performed by: INTERNAL MEDICINE

## 2022-01-28 PROCEDURE — 99231 SBSQ HOSP IP/OBS SF/LOW 25: CPT | Performed by: INTERNAL MEDICINE

## 2022-01-28 PROCEDURE — 82948 REAGENT STRIP/BLOOD GLUCOSE: CPT

## 2022-01-28 PROCEDURE — 80053 COMPREHEN METABOLIC PANEL: CPT | Performed by: INTERNAL MEDICINE

## 2022-01-28 RX ORDER — PRAVASTATIN SODIUM 80 MG/1
80 TABLET ORAL
Status: DISCONTINUED | OUTPATIENT
Start: 2022-01-28 | End: 2022-01-30 | Stop reason: HOSPADM

## 2022-01-28 RX ORDER — INSULIN GLARGINE 100 [IU]/ML
18 INJECTION, SOLUTION SUBCUTANEOUS
Status: DISCONTINUED | OUTPATIENT
Start: 2022-01-28 | End: 2022-01-30 | Stop reason: HOSPADM

## 2022-01-28 RX ORDER — ACETAZOLAMIDE 250 MG/1
250 TABLET ORAL EVERY 12 HOURS SCHEDULED
Status: COMPLETED | OUTPATIENT
Start: 2022-01-28 | End: 2022-01-28

## 2022-01-28 RX ORDER — TORSEMIDE 20 MG/1
10 TABLET ORAL ONCE
Status: COMPLETED | OUTPATIENT
Start: 2022-01-28 | End: 2022-01-28

## 2022-01-28 RX ADMIN — INSULIN LISPRO 3 UNITS: 100 INJECTION, SOLUTION INTRAVENOUS; SUBCUTANEOUS at 17:20

## 2022-01-28 RX ADMIN — OXYCODONE HYDROCHLORIDE AND ACETAMINOPHEN 1.5 TABLET: 5; 325 TABLET ORAL at 21:22

## 2022-01-28 RX ADMIN — CYCLOBENZAPRINE HYDROCHLORIDE 10 MG: 10 TABLET, FILM COATED ORAL at 17:10

## 2022-01-28 RX ADMIN — PRAVASTATIN SODIUM 80 MG: 80 TABLET ORAL at 17:10

## 2022-01-28 RX ADMIN — METOPROLOL TARTRATE 25 MG: 25 TABLET, FILM COATED ORAL at 17:10

## 2022-01-28 RX ADMIN — ACETAZOLAMIDE 250 MG: 250 TABLET ORAL at 12:05

## 2022-01-28 RX ADMIN — INSULIN LISPRO 2 UNITS: 100 INJECTION, SOLUTION INTRAVENOUS; SUBCUTANEOUS at 21:22

## 2022-01-28 RX ADMIN — CEPHALEXIN 500 MG: 500 CAPSULE ORAL at 08:24

## 2022-01-28 RX ADMIN — CYCLOBENZAPRINE HYDROCHLORIDE 10 MG: 10 TABLET, FILM COATED ORAL at 08:24

## 2022-01-28 RX ADMIN — HEPARIN SODIUM 5000 UNITS: 5000 INJECTION INTRAVENOUS; SUBCUTANEOUS at 21:20

## 2022-01-28 RX ADMIN — INSULIN GLARGINE 18 UNITS: 100 INJECTION, SOLUTION SUBCUTANEOUS at 21:20

## 2022-01-28 RX ADMIN — CEPHALEXIN 500 MG: 500 CAPSULE ORAL at 12:04

## 2022-01-28 RX ADMIN — INSULIN LISPRO 4 UNITS: 100 INJECTION, SOLUTION INTRAVENOUS; SUBCUTANEOUS at 08:20

## 2022-01-28 RX ADMIN — FERROUS SULFATE TAB 325 MG (65 MG ELEMENTAL FE) 325 MG: 325 (65 FE) TAB at 08:24

## 2022-01-28 RX ADMIN — TIOTROPIUM BROMIDE 18 MCG: 18 CAPSULE ORAL; RESPIRATORY (INHALATION) at 08:25

## 2022-01-28 RX ADMIN — ACETAZOLAMIDE 250 MG: 250 TABLET ORAL at 21:23

## 2022-01-28 RX ADMIN — CYCLOBENZAPRINE HYDROCHLORIDE 10 MG: 10 TABLET, FILM COATED ORAL at 21:20

## 2022-01-28 RX ADMIN — CEPHALEXIN 500 MG: 500 CAPSULE ORAL at 17:10

## 2022-01-28 RX ADMIN — INSULIN LISPRO 2 UNITS: 100 INJECTION, SOLUTION INTRAVENOUS; SUBCUTANEOUS at 12:05

## 2022-01-28 RX ADMIN — HEPARIN SODIUM 5000 UNITS: 5000 INJECTION INTRAVENOUS; SUBCUTANEOUS at 05:50

## 2022-01-28 RX ADMIN — OXYCODONE HYDROCHLORIDE AND ACETAMINOPHEN 1.5 TABLET: 5; 325 TABLET ORAL at 17:11

## 2022-01-28 RX ADMIN — FLUTICASONE FUROATE AND VILANTEROL TRIFENATATE 1 PUFF: 100; 25 POWDER RESPIRATORY (INHALATION) at 08:24

## 2022-01-28 RX ADMIN — HEPARIN SODIUM 5000 UNITS: 5000 INJECTION INTRAVENOUS; SUBCUTANEOUS at 12:57

## 2022-01-28 RX ADMIN — TORSEMIDE 10 MG: 20 TABLET ORAL at 12:03

## 2022-01-28 RX ADMIN — CEPHALEXIN 500 MG: 500 CAPSULE ORAL at 21:20

## 2022-01-28 NOTE — OCCUPATIONAL THERAPY NOTE
Occupational Therapy Treatment Note:       01/28/22 1523   OT Last Visit   OT Visit Date 01/28/22   Note Type   Cancel Reasons Other  (pt refused adls and ot session stating "get out")   ILYA Mix

## 2022-01-28 NOTE — PROGRESS NOTES
[facility]  Progress Note - Marcy Salinas Sr  1945, 78 y o  male MRN: [id number]  [facility] Encounter: [id number]  Primary Care Provider: Jerlene Sacks, MD   Date and time admitted to hospital: 1/20/2022 11:50 PM    * Sepsis [facility])  Assessment & Plan  · POA with tachycardia, leucocytosis  · Presented to the ED on 1/21/22 after he fell out of bed due to weakness  · Noted to have Klebsiella UTI and bacteremia  · Received Ceftriaxone and Cefepime initially - on cefazolin from 1/23 and changed to Cephalexin from 1/26  · ID following - plan to continue Cephalexin for total 7 days  · ID input appreciated      UTI (urinary tract infection)  Assessment & Plan  · Plan as above    Bacteremia  Assessment & Plan  · 1 out of 2 blood cultures from admission with Klebsiella pneumoniae - from UTI - sensitive to Cefazolin - antibiotics as above  · Second blood culture with coagulase negative Staphylococcus - contaminant    Acute kidney injury superimposed on chronic kidney disease-3 [facility])  Assessment & Plan  Lab Results   Component Value Date    EGFR 46 01/28/2022    EGFR 47 01/27/2022    EGFR 38 01/26/2022    CREATININE 1 46 (H) 01/28/2022    CREATININE 1 43 (H) 01/27/2022    CREATININE 1 70 (H) 01/26/2022   POA with creatinine 2 10, baseline around 1 4 to 1 6 (due to DM and HTN,)  · Due to UTI/bacteremia causing ATN  · Renal US - no hydronephrosis on the left, right with mild prominence of the renal pelvis with no calyceal dilation similar to June 18, 2018  · Diuretics had been held then received 1 dose of Torsemide 10 mg on 1/26 and 10 mg today   2 doses of Diamox 250 mg ordered for elevated bicarb   · Creatinine 1 46 today  · Treatment per nephrology - input appreciated  · Avoid nephrotoxic medications and hypotension        Anemia, iron deficiency  Assessment & Plan  · Drop in Hb from 12 2 on 1/21 to 9 8 on 1/25  · No overt bleeding  · Iron studies suggestive of iron deficiency anemia  · Hb stable at present  · F/u hemoccult  · Begun on FeSO4 on 1/27  · IV iron after infection treated    COPD, severe ([address] )  Assessment & Plan  Not in acute exacerbation  · Home Trelegy nonformulary - replaced with equivalent Dwayne Lau    Essential hypertension  Assessment & Plan  · Home medication Metoprolol tartrate was continued at increased dose(50 mg BID) due to tachycardia   · Diuretics held due to MARANDA  · BP now on the lower side  · Decrease Metoprolol tartrate to 25 mg BID  · Monitor BP    Fracture of navicular bone of left foot  Assessment & Plan  Recent, December 2021  · Seen by ortho, was WBAT LLE CAM boot  · Recommended outpatient f/u 4 weeks which he has not done   Has not been wearing boot  · Team spoke with ortho on 1/21 recommended to check xray  · Xray shows healing fracture with stable alignment  · Will recommend continued WBAT and outpatient ortho f/u    Type 2 diabetes mellitus with hyperglycemia, with long-term current use of insulin [facility])  Assessment & Plan  Lab Results   Component Value Date    HGBA1C 9 2 (H) 04/14/2021       Recent Labs     01/27/22  2113 01/28/22  0731 01/28/22  1136 01/28/22  1720   POCGLU 216* 230* 220* 246*       Blood Sugar Average: Last 72 hrs:  · (P) 152 6   · A1C poorly controlled, with hyperglycemia on admission  · Home regimen: U500 45 units before breakfast and lunch  · Was switched to basal/bolus on admission  · SSI with Accu-Cheks, carb controlled diet  · Hypoglycemia protocol  · Had persistent hypoglycemia hence Lantus decreased to 15 hs and Humalog to 3 TID on 1/26  · Blood sugars had improved but now increasing with improved oral intake  · Increase Lantus to 18 hs and Humalog to 5 TID  · Monitor blood sugars and adjust insulin accordingly      Chronic respiratory failure with hypoxia (HCC)  Assessment & Plan  On 3 L NC continuously at baseline  · Continue supplemental oxygen and titrate as needed to maintain SaO2 88-94%  · CXR with stable findings     Chronic diastolic heart failure (HCC)  Assessment & Plan  Wt Readings from Last 3 Encounters:   22 108 kg (238 lb 15 7 oz)   21 104 kg (229 lb 11 5 oz)   21 111 kg (244 lb 12 8 oz)     · Recent admission with acute on chronic heart failure  · Diuretics per MARANDA  · Monitor daily weights, I/O, volume status  · Low-sodium, fluid-restricted diet; patient admitted to some recent dietary indiscretion thus counseling provided  · Diuretic plan per MARANDA        Adenocarcinoma of lung [facility])  [address] with radiation oncology as outpatient, most recent office note reviewed from 2021  · He had CTA study on 2021--revealed no evidence of any disease  · Recommended to have repeat CT chest 2022 and f/u as outpatient  · Continue to recommend outpatient f/u      VTE Pharmacologic Prophylaxis: VTE Score: 5 High Risk (Score >/= 5) - Pharmacological DVT Prophylaxis Ordered: heparin  Sequential Compression Devices Ordered  Patient Centered Rounds: Discussed with RN    Education and Discussions with Family / Patient: Attempted to update  (son) via phone  Left voicemail  Time Spent for Care: 20 minutes  More than 50% of total time spent on counseling and coordination of care as described above  Current Length of Stay: 7 day(s)  Current Patient Status: Inpatient   Certification Statement: The patient will continue to require additional inpatient hospital stay due to awaiting placement    Code Status: Level 1 - Full Code    Subjective:   No fever  No shortness of breath  No nausea or vomiting  Objective:     Vitals:   Temp (24hrs), Av 6 °F (37 °C), Min:98 5 °F (36 9 °C), Max:98 7 °F (37 1 °C)    Temp:  [98 5 °F (36 9 °C)-98 7 °F (37 1 °C)] 98 7 °F (37 1 °C)  HR:  [83-97] 94  Resp:  [18-19] 18  BP: (109-126)/(43-68) 119/48  SpO2:  [96 %-99 %] 97 %  Body mass index is 32 41 kg/m²  Physical Exam:   Physical Exam  Vitals reviewed     HENT: Head: Normocephalic  Nose: Nose normal       Mouth/Throat:      Mouth: Mucous membranes are moist    Eyes:      Extraocular Movements: Extraocular movements intact  Cardiovascular:      Rate and Rhythm: Normal rate and regular rhythm  Pulmonary:      Effort: Pulmonary effort is normal  No respiratory distress  Breath sounds: Normal breath sounds  No wheezing  Abdominal:      General: Bowel sounds are normal  There is no distension  Palpations: Abdomen is soft  Tenderness: There is no abdominal tenderness  Musculoskeletal:      Cervical back: Neck supple  Right lower leg: Edema present  Left lower leg: Edema present  Skin:     General: Skin is warm and dry  Neurological:      General: No focal deficit present  Mental Status: He is alert  Psychiatric:         Mood and Affect: Mood normal           Additional Data:     Labs:  Results from last 7 days   Lab Units 01/28/22  0444 01/25/22  0928 01/25/22  0928   WBC Thousand/uL  --   --  11 25*   HEMOGLOBIN g/dL 9 8*   < > 9 8*   HEMATOCRIT % 32 5*   < > 30 9*   PLATELETS Thousands/uL  --   --  272   NEUTROS PCT %  --   --  83*   LYMPHS PCT %  --   --  6*   MONOS PCT %  --   --  9   EOS PCT %  --   --  1    < > = values in this interval not displayed       Results from last 7 days   Lab Units 01/28/22  0444   SODIUM mmol/L 139   POTASSIUM mmol/L 4 8   CHLORIDE mmol/L 102   CO2 mmol/L 33*   BUN mg/dL 32*   CREATININE mg/dL 1 46*   ANION GAP mmol/L 4   CALCIUM mg/dL 8 6   ALBUMIN g/dL 2 0*   TOTAL BILIRUBIN mg/dL 0 24   ALK PHOS U/L 151*   ALT U/L 13   AST U/L 11   GLUCOSE RANDOM mg/dL 203*         Results from last 7 days   Lab Units 01/28/22  1720 01/28/22  1136 01/28/22  0731 01/27/22  2113 01/27/22  1659 01/27/22  1121 01/27/22  0745 01/26/22  2204 01/26/22  2056 01/26/22  1647 01/26/22  1150 01/26/22  0807   POC GLUCOSE mg/dl 246* 220* 230* 216* 237* 155* 94 189* 182* 125 109 88         Results from last 7 days   Lab Units 01/24/22  0737 01/24/22  0434 01/22/22  1023   PROCALCITONIN ng/ml 9 39* 6 86* 17 47*       Lines/Drains:  Invasive Devices  Report    Peripheral Intravenous Line            Peripheral IV 12/15/21 Dorsal (posterior); Right Forearm 44 days    Peripheral IV 01/26/22 Right Antecubital 2 days                Last 24 Hours Medication List:   Current Facility-Administered Medications   Medication Dose Route Frequency Provider Last Rate    acetaZOLAMIDE  250 mg Oral Q12H Gabi Torres MD      cephalexin  500 mg Oral 4x Daily Leona Cameron MD      cyclobenzaprine  10 mg Oral TID Earna Shed, DO      ferrous sulfate  325 mg Oral Daily With Breakfast Vince Finley MD      fluticasone-vilanterol  1 puff Inhalation Daily Vince Finley MD      heparin (porcine)  5,000 Units Subcutaneous [facility] Earna Shed, DO      insulin glargine  18 Units Subcutaneous HS Vince Finley MD      insulin lispro  1-5 Units Subcutaneous HS Vince Finley MD      insulin lispro  1-6 Units Subcutaneous TID MD Golden Duenas ON 1/29/2022] insulin lispro  5 Units Subcutaneous TID With Meals Vince Finley MD      metoprolol tartrate  25 mg Oral BID Vince Finley MD      oxyCODONE-acetaminophen  1 5 tablet Oral Q4H PRN Earna Shed, DO      pravastatin  80 mg Oral Daily With Mason Schilder, MD      tiotropium  18 mcg Inhalation Daily Vince Finley MD          Today, Patient Was Seen By: Vince Finley MD    **Please Note: This note may have been constructed using a voice recognition system  **

## 2022-01-28 NOTE — PLAN OF CARE
Problem: MOBILITY - ADULT  Goal: Maintain or return to baseline ADL function  Description: INTERVENTIONS:  -  Assess patient's ability to carry out ADLs; assess patient's baseline for ADL function and identify physical deficits which impact ability to perform ADLs (bathing, care of mouth/teeth, toileting, grooming, dressing, etc )  - Assess/evaluate cause of self-care deficits   - Assess range of motion  - Assess patient's mobility; develop plan if impaired  - Assess patient's need for assistive devices and provide as appropriate  - Encourage maximum independence but intervene and supervise when necessary  - Involve family in performance of ADLs  - Assess for home care needs following discharge   - Consider OT consult to assist with ADL evaluation and planning for discharge  - Provide patient education as appropriate  Outcome: Progressing  Goal: Maintains/Returns to pre admission functional level  Description: INTERVENTIONS:  - Perform BMAT or MOVE assessment daily    - Set and communicate daily mobility goal to care team and patient/family/caregiver  - Collaborate with rehabilitation services on mobility goals if consulted  - Perform Range of Motion 3 times a day  - Reposition patient every 2 hours    - Dangle patient 3 times a day  - Stand patient 3 times a day  - Ambulate patient 3 times a day  - Out of bed to chair 3 times a day   - Out of bed for meals 3 times a day  - Out of bed for toileting  - Record patient progress and toleration of activity level   Outcome: Progressing     Problem: PAIN - ADULT  Goal: Verbalizes/displays adequate comfort level or baseline comfort level  Description: Interventions:  - Encourage patient to monitor pain and request assistance  - Assess pain using appropriate pain scale  - Administer analgesics based on type and severity of pain and evaluate response  - Implement non-pharmacological measures as appropriate and evaluate response  - Consider cultural and social influences on pain and pain management  - Notify physician/advanced practitioner if interventions unsuccessful or patient reports new pain  Outcome: Progressing     Problem: INFECTION - ADULT  Goal: Absence or prevention of progression during hospitalization  Description: INTERVENTIONS:  - Assess and monitor for signs and symptoms of infection  - Monitor lab/diagnostic results  - Monitor all insertion sites, i e  indwelling lines, tubes, and drains  - Monitor endotracheal if appropriate and nasal secretions for changes in amount and color  - [place] appropriate cooling/warming therapies per order  - Administer medications as ordered  - Instruct and encourage patient and family to use good hand hygiene technique  - Identify and instruct in appropriate isolation precautions for identified infection/condition  Outcome: Progressing  Goal: Absence of fever/infection during neutropenic period  Description: INTERVENTIONS:  - Monitor WBC    Outcome: Progressing     Problem: SAFETY ADULT  Goal: Maintain or return to baseline ADL function  Description: INTERVENTIONS:  -  Assess patient's ability to carry out ADLs; assess patient's baseline for ADL function and identify physical deficits which impact ability to perform ADLs (bathing, care of mouth/teeth, toileting, grooming, dressing, etc )  - Assess/evaluate cause of self-care deficits   - Assess range of motion  - Assess patient's mobility; develop plan if impaired  - Assess patient's need for assistive devices and provide as appropriate  - Encourage maximum independence but intervene and supervise when necessary  - Involve family in performance of ADLs  - Assess for home care needs following discharge   - Consider OT consult to assist with ADL evaluation and planning for discharge  - Provide patient education as appropriate  Outcome: Progressing  Goal: Maintains/Returns to pre admission functional level  Description: INTERVENTIONS:  - Perform BMAT or MOVE assessment daily    - Set and communicate daily mobility goal to care team and patient/family/caregiver  - Collaborate with rehabilitation services on mobility goals if consulted  - Perform Range of Motion 3 times a day  - Reposition patient every 2 hours    - Dangle patient 3 times a day  - Stand patient 3 times a day  - Ambulate patient 3 times a day  - Out of bed to chair 3 times a day   - Out of bed for meals 3 times a day  - Out of bed for toileting  - Record patient progress and toleration of activity level   Outcome: Progressing  Goal: Patient will remain free of falls  Description: INTERVENTIONS:  - Educate patient/family on patient safety including physical limitations  - Instruct patient to call for assistance with activity   - Consult OT/PT to assist with strengthening/mobility   - Keep Call bell within reach  - Keep bed low and locked with side rails adjusted as appropriate  - Keep care items and personal belongings within reach  - Initiate and maintain comfort rounds  - Make Fall Risk Sign visible to staff  - Offer Toileting every 2 Hours, in advance of need  - Initiate/Maintain alarm  - Obtain necessary fall risk management equipment:   - Apply yellow socks and bracelet for high fall risk patients  - Consider moving patient to room near nurses station  Outcome: Progressing     Problem: DISCHARGE PLANNING  Goal: Discharge to home or other facility with appropriate resources  Description: INTERVENTIONS:  - Identify barriers to discharge w/patient and caregiver  - Arrange for needed discharge resources and transportation as appropriate  - Identify discharge learning needs (meds, wound care, etc )  - Arrange for interpretive services to assist at discharge as needed  - Refer to Case Management Department for coordinating discharge planning if the patient needs post-hospital services based on physician/advanced practitioner order or complex needs related to functional status, cognitive ability, or social support system  Outcome: Progressing     Problem: Knowledge Deficit  Goal: Patient/family/caregiver demonstrates understanding of disease process, treatment plan, medications, and discharge instructions  Description: Complete learning assessment and assess knowledge base    Interventions:  - Provide teaching at level of understanding  - Provide teaching via preferred learning methods  Outcome: Progressing     Problem: Potential for Falls  Goal: Patient will remain free of falls  Description: INTERVENTIONS:  - Educate patient/family on patient safety including physical limitations  - Instruct patient to call for assistance with activity   - Consult OT/PT to assist with strengthening/mobility   - Keep Call bell within reach  - Keep bed low and locked with side rails adjusted as appropriate  - Keep care items and personal belongings within reach  - Initiate and maintain comfort rounds  - Make Fall Risk Sign visible to staff  - Offer Toileting every  Hours, in advance of need  - Initiate/Maintain alarm  - Obtain necessary fall risk management equipment:   - Apply yellow socks and bracelet for high fall risk patients  - Consider moving patient to room near nurses station  Outcome: Progressing     Problem: Prexisting or High Potential for Compromised Skin Integrity  Goal: Skin integrity is maintained or improved  Description: INTERVENTIONS:  - Identify patients at risk for skin breakdown  - Assess and monitor skin integrity  - Assess and monitor nutrition and hydration status  - Monitor labs   - Assess for incontinence   - Turn and reposition patient  - Assist with mobility/ambulation  - Relieve pressure over bony prominences  - Avoid friction and shearing  - Provide appropriate hygiene as needed including keeping skin clean and dry  - Evaluate need for skin moisturizer/barrier cream  - Collaborate with interdisciplinary team   - Patient/family teaching  - Consider wound care consult   Outcome: Progressing

## 2022-01-28 NOTE — ASSESSMENT & PLAN NOTE
Lab Results   Component Value Date    HGBA1C 9 2 (H) 04/14/2021       Recent Labs     01/27/22  2113 01/28/22  0731 01/28/22  1136 01/28/22  1720   POCGLU 216* 230* 220* 246*       Blood Sugar Average: Last 72 hrs:  · (P) 152 6   · A1C poorly controlled, with hyperglycemia on admission  · Home regimen: U500 45 units before breakfast and lunch  · Was switched to basal/bolus on admission  · SSI with Accu-Cheks, carb controlled diet  · Hypoglycemia protocol  · Had persistent hypoglycemia hence Lantus decreased to 15 hs and Humalog to 3 TID on 1/26  · Blood sugars had improved but now increasing with improved oral intake  · Increase Lantus to 18 hs and Humalog to 5 TID  · Monitor blood sugars and adjust insulin accordingly

## 2022-01-28 NOTE — ASSESSMENT & PLAN NOTE
· Drop in Hb from 12 2 on 1/21 to 9 8 on 1/25  · No overt bleeding  · Iron studies suggestive of iron deficiency anemia  · Hb stable at present  · F/u hemoccult  · Begun on FeSO4 on 1/27  · IV iron after infection treated

## 2022-01-28 NOTE — PROGRESS NOTES
[facility] Hospitalist History and Physical      Chief Complaint  Left lower extremity pain status post fall with left tib-fib fracture    Source  Patient    PCP  William S Hulesch, MD     Consultants:  Orthopedic surgery    History Of Present Illness  Sarah Beth Sosa  is a 89 year old female presenting with left lower extremity pain.  Patient has a history of chronic left-sided weakness with CVA, is bedbound and wheelchair-bound with 24-hour assistance with caregivers and was pivoting with caregiver and developed pain in her left leg, presented to the emergency room on 8/26/2020 and found to have a closed fracture of the left tibia and fibula, had knee placed in immobilizer after patient declined surgical intervention.  Patient did return home with nonweightbearing status however due to persisting pain and inability to pivot or transfer, contacted orthopedics today who recommended surgery, and patient presented to the emergency room.  Plan for surgical repair in a.m.  Patient denies any other complaints.  States pain is not completely controlled with tramadol.  Musculoskeletal Problem  Associated symptoms include joint swelling and weakness. Pertinent negatives include no abdominal pain, anorexia, arthralgias, change in bowel habit, chest pain, chills, congestion, coughing, diaphoresis, fatigue, fever, headaches, myalgias, nausea, neck pain, rash, sore throat or vomiting.         Past Medical History    Essential (primary) hypertension                              Thyroid condition                                             Surgical History  Past Surgical History:   Procedure Laterality Date   • Hip surgery Bilateral         Allergies  ALLERGIES:   Allergen Reactions   • Erythromycin Other (See Comments)   • Hydrocodone CONSTIPATION   • Tetracycline Other (See Comments)     Unknown        Medications  Current Facility-Administered Medications   Medication Dose Route Frequency Provider Last Rate Last Dose   • [START ON  Progress Note - Nephrology   Mehrdad Pardo  78 y o  male MRN: [id number]  Unit/Bed#: [facility] 603-01 Encounter: [id number]      Assessment / Plan:    14-year-old man with a history of stage III CKD with baseline creatinine of 1 4-1 6, hypertension, diabetes mellitus, COPD, CHF on chronic home oxygen  Admitted with a UTI  MARANDA and CKD stage 3  · Baseline creatinine of 1 4-1 6  · MARANDA secondary to urosepsis, bacteremia culminating in ATN  · Creatinine has improved and currently is 1 46  · Peak creatinine of 2 80 on 01/22  · Renal ultrasound without hydronephrosis on the left, right renal pelvis with prominence without any overt hydronephrosis  · Avoid nephrotoxins and relative hypotension  CKD  · Secondary to diabetes mellitus and hypertension  · Follows with Dr Valery Ignacio as an outpatient  Hyponatremia  · Resolved with hypotonic fluid administration    Other issues:  · Klebsiella bacteremia  · COPD  · Chronic diastolic CHF  · Type 2 diabetes mellitus  · Prostate carcinoma  · H/o Lung cancer  · Chronic respiratory failure with hypoxia and chronic home oxygen  · Anemia-hemoglobin has decreased from 12 2 to 9 9-increased RDW in decreased MCV  Further workup per primary team - normal B12 and folate/iron 28-iron sat 9%-ferritin 157    Plan:  · One dose of Demadex today given persistent significant edema  · 2 doses of Diamox due to increased bicarbonate consistent with contraction alkalosis  · Recheck BMP in a m  · Trend PVR - 94ml 1/26  · Monitor GFR and re-dose antibiotics as needed  · Continue oral iron, could administer intravenous once infection cleared  Would also check stool guaiacs - per primary team      Subjective: The patient was seen examined  He denied any shortness of breath, chest pain or pressure, abdominal pain, vomiting, diarrhea, sweats, chills  He feels that his peripheral edema has improved        Objective:     Vitals: Blood pressure 109/64, pulse 85, temperature 98 5 °F (36 9 °C), 9/5/2020] amLODIPine (NORVASC) tablet 10 mg  10 mg Oral Daily Jamie Brooks MD       • [START ON 9/5/2020] aspirin (ECOTRIN) enteric coated tablet 81 mg  81 mg Oral Daily Jamie Brooks MD       • [START ON 9/5/2020] cholecalciferol (VITAMIN D) tablet 125 mcg  125 mcg Oral Daily Jamie Brooks MD       • [START ON 9/5/2020] levothyroxine (SYNTHROID, LEVOTHROID) tablet 50 mcg  50 mcg Oral Daily Jamie Brooks MD       • [START ON 9/5/2020] lisinopril (ZESTRIL) tablet 40 mg  40 mg Oral Daily Jamie Brooks MD       • traMADol (ULTRAM) tablet 25 mg  25 mg Oral Q4H Jamie Brooks MD       • sodium chloride 0.9 % flush bag 25 mL  25 mL Intravenous PRN Jamie Brooks MD       • sodium chloride (PF) 0.9 % injection 2 mL  2 mL Intracatheter Q12H MARY Jamie Brooks MD       • Potassium Standard Replacement Protocol   Does not apply See Admin Instructions Jamie Brooks MD       • Magnesium Standard Replacement Protocol   Does not apply See Admin Instructions Jamie Brooks MD       • ondansetron (ZOFRAN) injection 4 mg  4 mg Intravenous BID PRN Jamie Brooks MD       • acetaminophen (TYLENOL) tablet 650 mg  650 mg Oral Q4H PRN Jamie Brooks MD       • polyethylene glycol (MIRALAX) packet 17 g  17 g Oral Daily PRN Jamie Brooks MD       • docusate sodium-sennosides (SENOKOT S) 50-8.6 MG 2 tablet  2 tablet Oral Daily PRN Jamie Brooks MD       • aluminum-magnesium hydroxide-simethicone (MAALOX) 200-200-20 MG/5ML suspension 30 mL  30 mL Oral Q4H PRN Jamie Brooks MD       • sodium chloride 0.9 % flush bag 25 mL  25 mL Intravenous PRN Jamie Brooks MD       • sodium chloride 0.9% infusion   Intravenous Continuous Jamie Brooks MD       • HYDROcodone-acetaminophen (NORCO) 5-325 MG per tablet 1 tablet  1 tablet Oral Q4H PRN Jamie Brooks MD         Prior to Admission medications    Medication Sig Start Date End Date Taking? Authorizing Provider   traMADol (ULTRAM) 50  temperature source Oral, resp  rate 18, height 6' (1 829 m), weight 112 kg (246 lb 12 8 oz), SpO2 99 %  ,Body mass index is 33 47 kg/m²  Temp (24hrs), Av 5 °F (36 9 °C), Min:98 5 °F (36 9 °C), Max:98 5 °F (36 9 °C)      Weight (last 2 days)     None                 Intake/Output Summary (Last 24 hours) at 2022 1034  Last data filed at 2022 0900  Gross per 24 hour   Intake 1250 ml   Output 1850 ml   Net -600 ml     I/O last 24 hours: In: 1610 [P O :1610]  Out: 2150 [Urine:2150]        Physical Exam    /64 (BP Location: Left arm)   Pulse 85   Temp 98 5 °F (36 9 °C) (Oral)   Resp 18   Ht 6' (1 829 m)   Wt 112 kg (246 lb 12 8 oz)   SpO2 99%   BMI 33 47 kg/m²   Vital signs were reviewed  Constitutional:  The patient was awake, alert, pleasant, cooperative and in no apparent distress  Cardiovascular:  No overt jugular venous distention was noted, S1-S2, no pericardial friction rub S3 were appreciated, peripheral edema has improved somewhat  Pulmonary:  Adequate story effort, lungs were clear to auscultation percussion with no rales/rhonchi wheezing noted  Abdominal/GI:  Soft, nontender, no rebound or guarding was noted  Skin:  No hives noted            Invasive Devices  Report    Peripheral Intravenous Line            Peripheral IV 12/15/21 Dorsal (posterior); Right Forearm 44 days    Peripheral IV 22 Right Antecubital 2 days                Medications:    Scheduled Meds:  Current Facility-Administered Medications   Medication Dose Route Frequency Provider Last Rate    cephalexin  500 mg Oral 4x Daily Kaitlin Kimbrough MD      cyclobenzaprine  10 mg Oral TID Divina Miles DO      ferrous sulfate  325 mg Oral Daily With Breakfast Michelle Rudd MD      fluticasone-vilanterol  1 puff Inhalation Daily Michelle Rudd MD      heparin (porcine)  5,000 Units Subcutaneous [facility] Divina Miles DO      insulin glargine  18 Units Subcutaneous HS Michelle Rudd MD      insulin MG tablet Take 25 mg by mouth every 4 hours. take 1/2 tab (25mg) every 4 hrs as needed  Indications: Pain, as needed for moderate to severe pain 9/1/20   Nolan Hernandez MD   HYDROcodone-acetaminophen (NORCO) 5-325 MG per tablet Take 1 tablet by mouth every 6 hours as needed for Pain.  Patient not taking: Reported on 9/2/2020 8/31/20 9/4/20  William S Hulesch, MD   ibuprofen (MOTRIN) 200 MG tablet Take 600 mg by mouth every 6 hours as needed for Fever or Pain (moderate). Ibuprofen increased to 600mg on 8/31. 8/29/20   William S Hulesch, MD   docusate sodium (Colace) 100 MG capsule Take 1 capsule by mouth daily. 8/26/20   Thai Jones MD   aspirin (Aspirin 81) 81 MG EC tablet Take 1 tablet by mouth daily. 8/26/20   Thai Jones MD   quinapril (ACCUPRIL) 40 MG tablet Take 1 tablet by mouth daily. 7/9/20   William S Hulesch, MD   levothyroxine (SYNTHROID) 50 MCG tablet Take 1 tablet by mouth daily. 7/9/20   William S Hulesch, MD   amLODIPine (NORVASC) 10 MG tablet Take 1 tablet by mouth daily. 7/9/20   William S Hulesch, MD   Cholecalciferol (VITAMIN D3) 125 mcg (5,000 units) capsule Take 125 mcg by mouth daily. 5/2/20   Provider Not In System   magnesium 250 MG tablet Take 250 mg by mouth daily. 5/2/20   Provider Not In System   VITAMIN E COMPLEX PO Take 1 mg by mouth daily. 5/2/20   Provider Not In System   nystatin (MYCOSTATIN) 417748 UNIT/GM cream Apply topically 2 times daily. 1/9/20   William S Hulesch, MD        Family History  Family History   Problem Relation Age of Onset   • Diabetes Neg Hx    • Heart disease Neg Hx        Social History  Social History     Tobacco Use   • Smoking status: Never Smoker   • Smokeless tobacco: Never Used   Substance Use Topics   • Alcohol use: Never     Frequency: Never   • Drug use: Never       Review of Systems  Review of Systems   Constitutional: Negative for chills, diaphoresis, fatigue and fever.   HENT: Negative for congestion and sore throat.    Eyes: Negative for  lispro  1-5 Units Subcutaneous HS Karen Vo MD      insulin lispro  1-6 Units Subcutaneous TID [facility] Karen Vo MD      insulin lispro  3 Units Subcutaneous TID With Meals YOLANDE Covarrubias      metoprolol tartrate  25 mg Oral BID Karen Vo MD      oxyCODONE-acetaminophen  1 5 tablet Oral Q4H PRN Paris Dennis DO      pravastatin  80 mg Oral Daily With Alexa Chan MD      tiotropium  18 mcg Inhalation Daily Karen Vo MD         PRN Meds: oxyCODONE-acetaminophen    Continuous Infusions:         LAB RESULTS:      Results from last 7 days   Lab Units 01/28/22  0444 01/27/22  0444 01/26/22  0459 01/25/22  0928 01/25/22  0431 01/24/22  0434 01/23/22  0646 01/22/22  1945 01/22/22  0457 01/22/22  0457 01/21/22  1908 01/21/22  1117   WBC Thousand/uL  --   --   --  11 25*  --  11 86* 17 37*  --   --  19 98*  --  20 27*   HEMOGLOBIN g/dL 9 8*  --   --  9 8*  --  9 9* 10 4*  --   --  11 3*  --  11 5*   HEMATOCRIT % 32 5*  --   --  30 9*  --  31 2* 33 1*  --   --  35 1*  --  35 6*   PLATELETS Thousands/uL  --   --   --  272  --  195 181  --   --  180  --  178   NEUTROS PCT %  --   --   --  83*  --  85* 85*  --   --   --   --  88*   LYMPHS PCT %  --   --   --  6*  --  5* 4*  --   --   --   --  3*   MONOS PCT %  --   --   --  9  --  8 9  --   --   --   --  8   EOS PCT %  --   --   --  1  --  1 1  --   --   --   --  0   POTASSIUM mmol/L 4 8 4 3 4 2  --  3 4* 3 6 3 7 3 6   < > 3 7   < > 4 0   CHLORIDE mmol/L 102 103 106  --  106 104 102 101   < > 96*   < > 94*   CO2 mmol/L 33* 31 29  --  26 26 24 27   < > 23   < > 28   BUN mg/dL 32* 38* 44*  --  46* 60* 66* 64*   < > 55*   < > 43*   CREATININE mg/dL 1 46* 1 43* 1 70*  --  1 69* 2 12* 2 68* 2 80*   < > 2 76*   < > 2 70*   CALCIUM mg/dL 8 6 8 8 8 6  --  8 1* 8 0* 8 4 9 7   < > 8 2*   < > 8 9   ALK PHOS U/L 151*  --   --   --   --   --   --   --   --   --   --   --    ALT U/L 13  --   --   --   --   --   --   --   --   --   --   -- AST U/L 11  --   --   --   --   --   --   --   --   --   --   --    MAGNESIUM mg/dL  --   --   --   --  2 5  --   --   --   --   --   --   --    PHOSPHORUS mg/dL  --   --   --   --  3 1  --   --   --   --   --   --   --     < > = values in this interval not displayed  CUTURES:  Lab Results   Component Value Date    BLOODCX Klebsiella pneumoniae (A) 01/21/2022    BLOODCX Staphylococcus coagulase negative (A) 01/21/2022    BLOODCX Staphylococcus coagulase negative (A) 01/21/2022    BLOODCX Globicatella sanguinis (A) 01/21/2022    BLOODCX No Growth After 5 Days  06/20/2018    BLOODCX No Growth After 5 Days  06/20/2018    BLOODCX No Growth After 5 Days  06/19/2018    URINECX >100,000 cfu/ml Klebsiella pneumoniae (A) 01/21/2022    URINECX >100,000 cfu/ml  09/11/2018    URINECX >100,000 cfu/ml Staphylococcus coagulase negative 12/03/2016                 PLEASE NOTE:  This encounter was completed utilizing the [employer] in [place]/Fluency Direct Speech Voice Recognition Software  Grammatical errors, random word insertions, pronoun errors and incomplete sentences are occasional consequences of the system due to software limitations, ambient noise and hardware issues  These may be missed by proof reading prior to affixing electronic signature  Any questions or concerns about the content, text or information contained within the body of this dictation should be directly addressed to the physician for clarification  Please do not hesitate to call me directly if you have any any questions or concerns  visual disturbance.   Respiratory: Negative for cough and shortness of breath.    Cardiovascular: Negative for chest pain and leg swelling.   Gastrointestinal: Negative for abdominal pain, anorexia, change in bowel habit, diarrhea, nausea and vomiting.   Endocrine: Negative for polyuria.   Genitourinary: Negative for difficulty urinating.   Musculoskeletal: Positive for gait problem and joint swelling. Negative for arthralgias, back pain, myalgias and neck pain.        Chronically wheelchair-bound, bedbound   Skin: Negative for rash.   Neurological: Positive for weakness. Negative for headaches.   Hematological: Does not bruise/bleed easily.   Psychiatric/Behavioral: Negative for sleep disturbance.         Physical Exam  Visit Vitals  /65   Pulse 90   Temp 97.9 °F (36.6 °C) (Oral)   Resp 18   Ht 5' (1.524 m)   Wt 58.3 kg (128 lb 8.5 oz)   SpO2 94%   BMI 25.10 kg/m²        Physical Exam  Vitals signs and nursing note reviewed.   Constitutional:       General: She is not in acute distress.  HENT:      Head: Normocephalic.      Right Ear: External ear normal.      Left Ear: External ear normal.      Nose: Nose normal. No rhinorrhea.      Mouth/Throat:      Mouth: Mucous membranes are moist.      Pharynx: No posterior oropharyngeal erythema.   Eyes:      Extraocular Movements: Extraocular movements intact.      Conjunctiva/sclera: Conjunctivae normal.      Pupils: Pupils are equal, round, and reactive to light.   Cardiovascular:      Rate and Rhythm: Normal rate and regular rhythm.      Pulses: Normal pulses.      Heart sounds: No murmur.   Pulmonary:      Effort: Pulmonary effort is normal.      Breath sounds: Normal breath sounds.   Abdominal:      General: Bowel sounds are normal. There is no distension.      Palpations: Abdomen is soft.   Musculoskeletal:         General: Deformity present.      Right lower leg: No edema.      Left lower leg: No edema.      Comments: Right foot with inversion and contracture,  left foot with normal capillary refill and 2+ pulse.   Lymphadenopathy:      Cervical: No cervical adenopathy.   Skin:     General: Skin is warm and dry.      Capillary Refill: Capillary refill takes less than 2 seconds.      Findings: No rash.   Neurological:      General: No focal deficit present.      Mental Status: She is alert and oriented to person, place, and time.   Psychiatric:         Behavior: Behavior normal.          Labs   Recent Labs   Lab 09/04/20  1907   SODIUM 138   POTASSIUM 4.1   CHLORIDE 106   CO2 26   BUN 16   CREATININE 0.39*   CALCIUM 9.2   ALBUMIN 3.2*   BILIRUBIN 0.4   ALKPT 78   GPT 25   AST 26   GLUCOSE 94   WBC 12.8*   RBC 3.93*   HGB 12.3   HCT 37.3          Imaging  XR CHEST PA OR AP 1 VIEW   Final Result   1.  Mildly prominent cardiac silhouette and findings of mild perihilar interstitial edema are likely exaggerated due to AP technique and low lung volumes.   2.  Possible small left pleural effusion or instead pleural thickening, unchanged since the prior exam.   3.  No lung opacity concerning for consolidation.      Electronically Signed by: APOLONIA RUSH M.D.    Signed on: 9/4/2020 7:42 PM              Assessment and Plan  Tibia fracture  Plan for repair in a.m. by orthopedic surgery.  Class II cardiac risk for surgery, no further cardiac work-up at this time.  Continue blood pressure control perioperatively.  Pain control.    Acquired hypothyroidism  Synthroid.    Benign essential hypertension  Amlodipine, lisinopril    H/O osteoporosis  Vitamin D replacement    Hypercholesterolemia  Not on statin.  Diet controlled.    History of CVA (cerebrovascular accident)  On aspirin.  Will continue.      Will repeat labs in AM to assess response to management plan.  I independently reviewed prior and current records, labs, and imaging.  Discussed with clinical team, Dr. Cabrera    DVT Prophylaxis  Chemical prophylaxis on hold in anticipation for surgery    Code Status  Full  code    Will monitor overnight and re-evaluate in AM.     Discussed current status and plan of care with Patient, who verbalized understanding.     Jamie Brooks MD   9/4/2020

## 2022-01-28 NOTE — ASSESSMENT & PLAN NOTE
· Home medication Metoprolol tartrate was continued at increased dose(50 mg BID) due to tachycardia   · Diuretics held due to MARANDA  · BP now on the lower side  · Decrease Metoprolol tartrate to 25 mg BID  · Monitor BP

## 2022-01-28 NOTE — PHYSICAL THERAPY NOTE
Physical Therapy Cancellation Note    Attempted to see pt this AM   Pt declining OOB mobility at that time  Pt then observed to be standing at bedside to urinate without need for UE support immediately s/p attempt  Pt reported he was agreeable to attempt later, but as this PTA attempted in PM, was told that pt already refused OT & restorative attempts at mobility  Discussed the same with CM & supervising PT  Will keep pt on caseload & attempt once more  If pt refuses, will d/c PT services due to lack of participation      Homa Caruso, PTA

## 2022-01-28 NOTE — ASSESSMENT & PLAN NOTE
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

## 2022-01-28 NOTE — ASSESSMENT & PLAN NOTE
Wt Readings from Last 3 Encounters:   01/28/22 108 kg (238 lb 15 7 oz)   12/16/21 104 kg (229 lb 11 5 oz)   07/25/21 111 kg (244 lb 12 8 oz)     · Recent admission with acute on chronic heart failure     · Diuretics per MARANDA  · Monitor daily weights, I/O, volume status  · Low-sodium, fluid-restricted diet; patient admitted to some recent dietary indiscretion thus counseling provided  · Diuretic plan per MARANDA

## 2022-01-28 NOTE — PROGRESS NOTES
Progress Note - Infectious Disease   Stu Melendrez Sr  78 y o  male MRN: [id number]  Unit/Bed#: [facility] 603-01 Encounter: [id number]      Impression:  1  Klebsiella pneumoniae urosepsis with MARANDA on CKD  Gram-positive cocci contaminant  2  COPD with Chronic respiratory failure with hypoxia  3  Essential hypertension  4  Chronic diastolic CHF   5  Type 2 DM  6  History of prostate carcinoma     Recommendations:   Patient is afebrile with WBC count 11,250 when last taken  1  One blood and urine culture showing Klebsiella pneumoniae that is cefazolin susceptible  The other blood culture showing a coagulase-negative Staphylococcus which is a contaminant  2  Continue cephalexin 500 mg q i d  [place] x 4 more days  Antibiotics:   1  Cephalexin 500 mg q i d p o , Day 3 of 7    Subjective:  Mild pain in both legs and back  Denies fevers, chills, or sweats  Denies nausea, vomiting, or diarrhea  Objective:  Vitals:  Temp:  [98 5 °F (36 9 °C)-98 7 °F (37 1 °C)] 98 7 °F (37 1 °C)  HR:  [83-97] 94  Resp:  [18-19] 18  BP: (109-126)/(43-68) 119/48  SpO2:  [96 %-99 %] 97 %  Temp (24hrs), Av 6 °F (37 °C), Min:98 5 °F (36 9 °C), Max:98 7 °F (37 1 °C)  Current: Temperature: 98 7 °F (37 1 °C)    Physical Exam:     General Appearance:  Alert, chronically ill-appearing nontoxic, no acute distress  Throat: Oropharynx moist without lesions  Lips, mucosa, and tongue normal   Neck: Supple, symmetrical, trachea midline, no adenopathy,  no tenderness/mass/nodules   Lungs:   Hyper resonant few diffuse rhonchi, decreased respiratory expansion    Heart:  Regular rate and rhythm, S1, S2 normal, no murmur, rub or gallop   Abdomen:   Soft, non-tender, markedly protuberant non-distended, positive bowel sounds  No masses, no organomegaly    No CVA tenderness   Extremities: Lower extremity stasis changes   Skin: As above           Invasive Devices  Report    Peripheral Intravenous Line            Peripheral IV 12/15/21 Dorsal (posterior); Right Forearm 44 days    Peripheral IV 01/26/22 Right Antecubital 2 days                Labs, Imaging, & Other studies:   All pertinent labs were personally reviewed  Results from last 7 days   Lab Units 01/28/22 0444 01/25/22  0928 01/24/22  0434 01/23/22  0646 01/23/22  0646   WBC Thousand/uL  --  11 25* 11 86*  --  17 37*   HEMOGLOBIN g/dL 9 8* 9 8* 9 9*   < > 10 4*   PLATELETS Thousands/uL  --  272 195  --  181    < > = values in this interval not displayed  Results from last 7 days   Lab Units 01/28/22 0444 01/27/22 0444 01/27/22  0444 01/26/22  0459 01/26/22  0459   SODIUM mmol/L 139  --  138  --  141   POTASSIUM mmol/L 4 8   < > 4 3   < > 4 2   CHLORIDE mmol/L 102   < > 103   < > 106   CO2 mmol/L 33*   < > 31   < > 29   BUN mg/dL 32*   < > 38*   < > 44*   CREATININE mg/dL 1 46*   < > 1 43*   < > 1 70*   EGFR ml/min/1 73sq m 46   < > 47   < > 38   CALCIUM mg/dL 8 6   < > 8 8   < > 8 6   AST U/L 11  --   --   --   --    ALT U/L 13  --   --   --   --    ALK PHOS U/L 151*  --   --   --   --     < > = values in this interval not displayed

## 2022-01-28 NOTE — ASSESSMENT & PLAN NOTE
· H/o adenocarcinoma of the prostate   · S/p prostate seed brachytherapy followed by pelvic radiation in 2007  · Outpatient follow-up

## 2022-01-29 PROBLEM — N18.9 ACUTE KIDNEY INJURY SUPERIMPOSED ON CHRONIC KIDNEY DISEASE (HCC): Status: RESOLVED | Noted: 2022-01-21 | Resolved: 2022-01-29

## 2022-01-29 PROBLEM — E83.52 HYPERCALCEMIA: Status: ACTIVE | Noted: 2022-01-29

## 2022-01-29 PROBLEM — N17.9 ACUTE KIDNEY INJURY SUPERIMPOSED ON CHRONIC KIDNEY DISEASE (HCC): Status: RESOLVED | Noted: 2022-01-21 | Resolved: 2022-01-29

## 2022-01-29 PROBLEM — N18.30 CKD (CHRONIC KIDNEY DISEASE) STAGE 3, GFR 30-59 ML/MIN (HCC): Status: ACTIVE | Noted: 2022-01-29

## 2022-01-29 LAB
ANION GAP SERPL CALCULATED.3IONS-SCNC: 5 MMOL/L (ref 4–13)
BUN SERPL-MCNC: 28 MG/DL (ref 5–25)
CALCIUM SERPL-MCNC: 9.3 MG/DL (ref 8.3–10.1)
CHLORIDE SERPL-SCNC: 102 MMOL/L (ref 100–108)
CO2 SERPL-SCNC: 30 MMOL/L (ref 21–32)
CREAT SERPL-MCNC: 1.46 MG/DL (ref 0.6–1.3)
GFR SERPL CREATININE-BSD FRML MDRD: 46 ML/MIN/1.73SQ M
GLUCOSE SERPL-MCNC: 182 MG/DL (ref 65–140)
GLUCOSE SERPL-MCNC: 193 MG/DL (ref 65–140)
GLUCOSE SERPL-MCNC: 195 MG/DL (ref 65–140)
GLUCOSE SERPL-MCNC: 207 MG/DL (ref 65–140)
GLUCOSE SERPL-MCNC: 279 MG/DL (ref 65–140)
POTASSIUM SERPL-SCNC: 4.8 MMOL/L (ref 3.5–5.3)
SODIUM SERPL-SCNC: 137 MMOL/L (ref 136–145)

## 2022-01-29 PROCEDURE — 80048 BASIC METABOLIC PNL TOTAL CA: CPT | Performed by: INTERNAL MEDICINE

## 2022-01-29 PROCEDURE — 82948 REAGENT STRIP/BLOOD GLUCOSE: CPT

## 2022-01-29 PROCEDURE — 99231 SBSQ HOSP IP/OBS SF/LOW 25: CPT | Performed by: INTERNAL MEDICINE

## 2022-01-29 PROCEDURE — 99232 SBSQ HOSP IP/OBS MODERATE 35: CPT | Performed by: INTERNAL MEDICINE

## 2022-01-29 PROCEDURE — 99232 SBSQ HOSP IP/OBS MODERATE 35: CPT | Performed by: NURSE PRACTITIONER

## 2022-01-29 RX ADMIN — CYCLOBENZAPRINE HYDROCHLORIDE 10 MG: 10 TABLET, FILM COATED ORAL at 08:49

## 2022-01-29 RX ADMIN — CYCLOBENZAPRINE HYDROCHLORIDE 10 MG: 10 TABLET, FILM COATED ORAL at 21:44

## 2022-01-29 RX ADMIN — HEPARIN SODIUM 5000 UNITS: 5000 INJECTION INTRAVENOUS; SUBCUTANEOUS at 06:23

## 2022-01-29 RX ADMIN — PRAVASTATIN SODIUM 80 MG: 80 TABLET ORAL at 17:12

## 2022-01-29 RX ADMIN — CEPHALEXIN 500 MG: 500 CAPSULE ORAL at 17:12

## 2022-01-29 RX ADMIN — HEPARIN SODIUM 5000 UNITS: 5000 INJECTION INTRAVENOUS; SUBCUTANEOUS at 12:25

## 2022-01-29 RX ADMIN — CEPHALEXIN 500 MG: 500 CAPSULE ORAL at 08:50

## 2022-01-29 RX ADMIN — INSULIN LISPRO 3 UNITS: 100 INJECTION, SOLUTION INTRAVENOUS; SUBCUTANEOUS at 12:19

## 2022-01-29 RX ADMIN — METOPROLOL TARTRATE 25 MG: 25 TABLET, FILM COATED ORAL at 08:49

## 2022-01-29 RX ADMIN — CEPHALEXIN 500 MG: 500 CAPSULE ORAL at 21:44

## 2022-01-29 RX ADMIN — CEPHALEXIN 500 MG: 500 CAPSULE ORAL at 12:24

## 2022-01-29 RX ADMIN — CYCLOBENZAPRINE HYDROCHLORIDE 10 MG: 10 TABLET, FILM COATED ORAL at 17:12

## 2022-01-29 RX ADMIN — FLUTICASONE FUROATE AND VILANTEROL TRIFENATATE 1 PUFF: 100; 25 POWDER RESPIRATORY (INHALATION) at 08:50

## 2022-01-29 RX ADMIN — INSULIN LISPRO 2 UNITS: 100 INJECTION, SOLUTION INTRAVENOUS; SUBCUTANEOUS at 17:11

## 2022-01-29 RX ADMIN — HEPARIN SODIUM 5000 UNITS: 5000 INJECTION INTRAVENOUS; SUBCUTANEOUS at 21:44

## 2022-01-29 RX ADMIN — INSULIN LISPRO 1 UNITS: 100 INJECTION, SOLUTION INTRAVENOUS; SUBCUTANEOUS at 08:50

## 2022-01-29 RX ADMIN — INSULIN LISPRO 2 UNITS: 100 INJECTION, SOLUTION INTRAVENOUS; SUBCUTANEOUS at 21:44

## 2022-01-29 RX ADMIN — INSULIN GLARGINE 18 UNITS: 100 INJECTION, SOLUTION SUBCUTANEOUS at 21:44

## 2022-01-29 RX ADMIN — FERROUS SULFATE TAB 325 MG (65 MG ELEMENTAL FE) 325 MG: 325 (65 FE) TAB at 08:49

## 2022-01-29 RX ADMIN — INSULIN LISPRO 2 UNITS: 100 INJECTION, SOLUTION INTRAVENOUS; SUBCUTANEOUS at 12:18

## 2022-01-29 RX ADMIN — TIOTROPIUM BROMIDE 18 MCG: 18 CAPSULE ORAL; RESPIRATORY (INHALATION) at 08:50

## 2022-01-29 NOTE — PLAN OF CARE
Problem: MOBILITY - ADULT  Goal: Maintain or return to baseline ADL function  Description: INTERVENTIONS:  -  Assess patient's ability to carry out ADLs; assess patient's baseline for ADL function and identify physical deficits which impact ability to perform ADLs (bathing, care of mouth/teeth, toileting, grooming, dressing, etc )  - Assess/evaluate cause of self-care deficits   - Assess range of motion  - Assess patient's mobility; develop plan if impaired  - Assess patient's need for assistive devices and provide as appropriate  - Encourage maximum independence but intervene and supervise when necessary  - Involve family in performance of ADLs  - Assess for home care needs following discharge   - Consider OT consult to assist with ADL evaluation and planning for discharge  - Provide patient education as appropriate  Outcome: Progressing  Goal: Maintains/Returns to pre admission functional level  Description: INTERVENTIONS:  - Perform BMAT or MOVE assessment daily    - Set and communicate daily mobility goal to care team and patient/family/caregiver  - Collaborate with rehabilitation services on mobility goals if consulted  - Perform Range of Motion 3 times a day  - Reposition patient every 2 hours    - Dangle patient 3 times a day  - Stand patient 3 times a day  - Ambulate patient 3 times a day  - Out of bed to chair 3 times a day   - Out of bed for meals 3 times a day  - Out of bed for toileting  - Record patient progress and toleration of activity level   Outcome: Progressing     Problem: PAIN - ADULT  Goal: Verbalizes/displays adequate comfort level or baseline comfort level  Description: Interventions:  - Encourage patient to monitor pain and request assistance  - Assess pain using appropriate pain scale  - Administer analgesics based on type and severity of pain and evaluate response  - Implement non-pharmacological measures as appropriate and evaluate response  - Consider cultural and social influences on pain and pain management  - Notify physician/advanced practitioner if interventions unsuccessful or patient reports new pain  Outcome: Progressing     Problem: INFECTION - ADULT  Goal: Absence or prevention of progression during hospitalization  Description: INTERVENTIONS:  - Assess and monitor for signs and symptoms of infection  - Monitor lab/diagnostic results  - Monitor all insertion sites, i e  indwelling lines, tubes, and drains  - Monitor endotracheal if appropriate and nasal secretions for changes in amount and color  - [place] appropriate cooling/warming therapies per order  - Administer medications as ordered  - Instruct and encourage patient and family to use good hand hygiene technique  - Identify and instruct in appropriate isolation precautions for identified infection/condition  Outcome: Progressing  Goal: Absence of fever/infection during neutropenic period  Description: INTERVENTIONS:  - Monitor WBC    Outcome: Progressing     Problem: SAFETY ADULT  Goal: Maintain or return to baseline ADL function  Description: INTERVENTIONS:  -  Assess patient's ability to carry out ADLs; assess patient's baseline for ADL function and identify physical deficits which impact ability to perform ADLs (bathing, care of mouth/teeth, toileting, grooming, dressing, etc )  - Assess/evaluate cause of self-care deficits   - Assess range of motion  - Assess patient's mobility; develop plan if impaired  - Assess patient's need for assistive devices and provide as appropriate  - Encourage maximum independence but intervene and supervise when necessary  - Involve family in performance of ADLs  - Assess for home care needs following discharge   - Consider OT consult to assist with ADL evaluation and planning for discharge  - Provide patient education as appropriate  Outcome: Progressing  Goal: Maintains/Returns to pre admission functional level  Description: INTERVENTIONS:  - Perform BMAT or MOVE assessment daily    - Set and communicate daily mobility goal to care team and patient/family/caregiver  - Collaborate with rehabilitation services on mobility goals if consulted  - Perform Range of Motion 3 times a day  - Reposition patient every 2 hours    - Dangle patient 3 times a day  - Stand patient 3 times a day  - Ambulate patient 3 times a day  - Out of bed to chair 3 times a day   - Out of bed for meals 3 times a day  - Out of bed for toileting  - Record patient progress and toleration of activity level   Outcome: Progressing  Goal: Patient will remain free of falls  Description: INTERVENTIONS:  - Educate patient/family on patient safety including physical limitations  - Instruct patient to call for assistance with activity   - Consult OT/PT to assist with strengthening/mobility   - Keep Call bell within reach  - Keep bed low and locked with side rails adjusted as appropriate  - Keep care items and personal belongings within reach  - Initiate and maintain comfort rounds  - Make Fall Risk Sign visible to staff  - Offer Toileting every 2 Hours, in advance of need  - Initiate/Maintain alarm  - Obtain necessary fall risk management equipment:   - Apply yellow socks and bracelet for high fall risk patients  - Consider moving patient to room near nurses station  Outcome: Progressing     Problem: DISCHARGE PLANNING  Goal: Discharge to home or other facility with appropriate resources  Description: INTERVENTIONS:  - Identify barriers to discharge w/patient and caregiver  - Arrange for needed discharge resources and transportation as appropriate  - Identify discharge learning needs (meds, wound care, etc )  - Arrange for interpretive services to assist at discharge as needed  - Refer to Case Management Department for coordinating discharge planning if the patient needs post-hospital services based on physician/advanced practitioner order or complex needs related to functional status, cognitive ability, or social support system  Outcome: Progressing     Problem: Knowledge Deficit  Goal: Patient/family/caregiver demonstrates understanding of disease process, treatment plan, medications, and discharge instructions  Description: Complete learning assessment and assess knowledge base    Interventions:  - Provide teaching at level of understanding  - Provide teaching via preferred learning methods  Outcome: Progressing     Problem: Potential for Falls  Goal: Patient will remain free of falls  Description: INTERVENTIONS:  - Educate patient/family on patient safety including physical limitations  - Instruct patient to call for assistance with activity   - Consult OT/PT to assist with strengthening/mobility   - Keep Call bell within reach  - Keep bed low and locked with side rails adjusted as appropriate  - Keep care items and personal belongings within reach  - Initiate and maintain comfort rounds  - Make Fall Risk Sign visible to staff  - Offer Toileting every 2 Hours, in advance of need  - Initiate/Maintain alarm  - Obtain necessary fall risk management equipment:   - Apply yellow socks and bracelet for high fall risk patients  - Consider moving patient to room near nurses station  Outcome: Progressing     Problem: Prexisting or High Potential for Compromised Skin Integrity  Goal: Skin integrity is maintained or improved  Description: INTERVENTIONS:  - Identify patients at risk for skin breakdown  - Assess and monitor skin integrity  - Assess and monitor nutrition and hydration status  - Monitor labs   - Assess for incontinence   - Turn and reposition patient  - Assist with mobility/ambulation  - Relieve pressure over bony prominences  - Avoid friction and shearing  - Provide appropriate hygiene as needed including keeping skin clean and dry  - Evaluate need for skin moisturizer/barrier cream  - Collaborate with interdisciplinary team   - Patient/family teaching  - Consider wound care consult   Outcome: Progressing

## 2022-01-29 NOTE — ASSESSMENT & PLAN NOTE
· POA with tachycardia, leucocytosis  · Presented to the ED on 1/21/22 after he fell out of bed due to weakness  · Noted to have Klebsiella UTI and bacteremia  · Received Ceftriaxone and Cefepime initially - on cefazolin from 1/23 and changed to Cephalexin from 1/26 today day # 4 of 7  · ID following - plan to continue Cephalexin for total 7 days  · ID input appreciated

## 2022-01-29 NOTE — CASE MANAGEMENT
Case Management Discharge Planning Note    Patient name Mirtha Garcia  Location [facility] 315-53 MRN [id number]  : 1945 Date 2022       Current Admission Date: 2022  Current Admission Diagnosis:Sepsis [facility])   Patient Active Problem List    Diagnosis Date Noted    History of prostate cancer 2022    UTI (urinary tract infection) 2022    Bacteremia 2022    Adenocarcinoma of lung ([facility] ) 2022    Acute kidney injury superimposed on chronic kidney disease-3 ([facility] ) 2022    Fracture of navicular bone of left foot 2021    Chronic respiratory failure with hypoxia ([facility] ) 04/15/2021    Obesity, morbid ([facility] ) 2021    [facility] 2021    History of lung cancer 2020    DDD (degenerative disc disease), lumbar 2020    Anemia, iron deficiency 2020    Lung nodule 2020    Pulmonary hypertension ([address] ) 2019    JACQUELINE (obstructive sleep apnea) 2019    COPD with acute exacerbation ([facility] ) 2019    Oxygen dependent 2018    RBBB 2018    CAD (coronary artery disease) 2018    Chronic diastolic heart failure ([facility] ) 2018    Pleural effusion on right 10/31/2017    Sepsis ([facility] ) 2017    COPD, severe ([facility] ) 2017    Diabetic neuropathy ([facility] ) 2017    Essential hypertension 2016    Venous stasis dermatitis of both lower extremities 11/15/2016    Type 2 diabetes mellitus with hyperglycemia, with long-term current use of insulin ([facility] ) 2016    Obesity (BMI 30-39 9) 2016    HLD (hyperlipidemia) 2016      LOS (days): 8  Geometric Mean LOS (GMLOS) (days): 4 80  Days to GMLOS:-3 5     OBJECTIVE:  Risk of Unplanned Readmission Score: 29         Current admission status: Inpatient   Preferred Pharmacy:   [address] [place], [place] [address] [place] [address]  Phone: [phone] Fax: [phone]    Primary Care Provider: Tatiana Light MD    Primary Insurance: [facility]  Secondary Insurance:     DISCHARGE DETAILS:       Pt stable for d/c  SNF locations are reviewing but pt will need to have a therapy session for updated notes   Currently no accepting locations, but CM reached out

## 2022-01-29 NOTE — ASSESSMENT & PLAN NOTE
Wt Readings from Last 3 Encounters:   01/29/22 108 kg (238 lb 1 6 oz)   12/16/21 104 kg (229 lb 11 5 oz)   07/25/21 111 kg (244 lb 12 8 oz)     · Recent admission with acute on chronic heart failure     · Diuretics per MARANDA  · Monitor daily weights, I/O, volume status  · Low-sodium, fluid-restricted diet; patient admitted to some recent dietary indiscretion thus counseling provided  · Diuretic plan per MARANDA

## 2022-01-29 NOTE — ASSESSMENT & PLAN NOTE
· 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  · Completed SBRT to RUL on 12/31/20  · Last seen by radiation oncology on 9/14/21  · He had CTA study on July 23, 2021 - "Stable right lung volume loss secondary to areas of rounded atelectasis at the right middle and lower lobes   Small/moderate size right pleural effusion "  · CXR this admission - "Stable right lung volume loss with known right lung base round atelectasis and loculated pleural effusion "  · Recommended to have repeat CT chest January 2022 and f/u as outpatient will order one on Monday since pt remains in the hospital   · Continue to recommend outpatient f/u

## 2022-01-29 NOTE — PROGRESS NOTES
[facility] [facility]  Progress Note - Emily Barnes Sr  1945, 78 y o  male MRN: [id number]  [facility] Encounter: [id number]  Primary Care Provider: Roxy Kirkpatrick MD   Date and time admitted to hospital: 1/20/2022 11:50 PM    * Sepsis [facility])  Assessment & Plan  · POA with tachycardia, leucocytosis  · Presented to the ED on 1/21/22 after he fell out of bed due to weakness  · Noted to have Klebsiella UTI and bacteremia  · Received Ceftriaxone and Cefepime initially - on cefazolin from 1/23 and changed to Cephalexin from 1/26 today day # 4 of 7  · ID following - plan to continue Cephalexin for total 7 days  · ID input appreciated      Adenocarcinoma [facility] lung ([facility] )  Assessment & Plan  · 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  · Completed SBRT to [facility] on 12/31/20  · Last seen by radiation oncology on 9/14/21  · He had CTA study on July 23, 2021 - "Stable right lung volume loss secondary to areas of rounded atelectasis at the right middle and lower lobes  Small/moderate size right pleural effusion "  · CXR this admission - "Stable right lung volume loss with known right lung base round atelectasis and loculated pleural effusion "  · Recommended to have repeat CT chest January 2022 and f/u as outpatient will order one on Monday since pt remains in the hospital   · Continue to recommend outpatient f/u            COPD, severe ([facility] )  Assessment & Plan  Not in acute exacerbation  · Home Trelegy nonformulary - replaced with equivalent Breo, Spiriva    Chronic diastolic heart failure (HCC)  Assessment & Plan  Wt Readings from Last 3 Encounters:   01/29/22 108 kg (238 lb 1 6 oz)   12/16/21 104 kg (229 lb 11 5 oz)   07/25/21 111 kg (244 lb 12 8 oz)     · Recent admission with acute on chronic heart failure     · Diuretics per MARANDA  · Monitor daily weights, I/O, volume status  · Low-sodium, fluid-restricted diet; patient admitted to some recent dietary indiscretion thus counseling provided  · Diuretic plan per MARANDA        History of prostate cancer  Assessment & Plan  · H/o adenocarcinoma of the prostate   · S/p prostate seed brachytherapy followed by pelvic radiation in 2007  · Outpatient follow-up      UTI (urinary tract infection)  Assessment & Plan  · Plan as above    Bacteremia  Assessment & Plan  · 1 out of 2 blood cultures from admission with Klebsiella pneumoniae - from UTI - sensitive to Cefazolin - antibiotics as above  · Second blood culture with coagulase negative Staphylococcus - contaminant    Acute kidney injury superimposed on chronic kidney disease-3 [facility])  Assessment & Plan  Lab Results   Component Value Date    EGFR 46 01/29/2022    EGFR 46 01/28/2022    EGFR 47 01/27/2022    CREATININE 1 46 (H) 01/29/2022    CREATININE 1 46 (H) 01/28/2022    CREATININE 1 43 (H) 01/27/2022   POA with creatinine 2 10, baseline around 1 4 to 1 6 (due to DM and HTN,)  · Due to UTI/bacteremia causing ATN  · Renal US - no hydronephrosis on the left, right with mild prominence of the renal pelvis with no calyceal dilation similar to June 18, 2018  · Diuretics had been held then received 1 dose of Torsemide 10 mg on 1/26 and 10 mg today  2 doses of Diamox 250 mg ordered for elevated bicarb   · Creatinine 1 46 today  · Treatment per nephrology - input appreciated  · Avoid nephrotoxic medications and hypotension        Fracture of navicular bone of left foot  Assessment & Plan  Recent, December 2021  · Seen by ortho, was WBAT LLE CAM boot  · Recommended outpatient f/u 4 weeks which he has not done   Has not been wearing boot  · Team spoke with ortho on 1/21 recommended to check xray  · Xray shows healing fracture with stable alignment  · Will recommend continued WBAT and outpatient ortho f/u    Chronic respiratory failure with hypoxia ([address] )  Assessment & Plan  On 3 L NC continuously at baseline  · Continue supplemental oxygen and titrate as needed to maintain SaO2 88-94%  · CXR with stable findings     Anemia, iron deficiency  Assessment & Plan  · Drop in Hb from 12 2 on 1/21 to 9 8 on 1/25  · No overt bleeding  · Iron studies suggestive of iron deficiency anemia  · Hb stable at present  · F/u hemoccult  · Begun on FeSO4 on 1/27  · IV iron after infection treated    Essential hypertension  Assessment & Plan  · Home medication Metoprolol tartrate was continued at increased dose(50 mg BID) due to tachycardia   · Diuretics held due to MARADNA  · BP now on the lower side  · Decrease Metoprolol tartrate to 25 mg BID  · Monitor BP    Type 2 diabetes mellitus with hyperglycemia, with long-term current use of insulin [facility])  Assessment & Plan  Lab Results   Component Value Date    HGBA1C 9 2 (H) 04/14/2021       Recent Labs     01/28/22  1136 01/28/22  1720 01/28/22 2054 01/29/22  0655   POCGLU 220* 246* 267* 182*       Blood Sugar Average: Last 72 hrs:  · (P) 840 [id number]   · A1C poorly controlled, with hyperglycemia on admission  · Home regimen: U500 45 units before breakfast and lunch  · Was switched to basal/bolus on admission  · SSI with Accu-Cheks, carb controlled diet  · Hypoglycemia protocol  · Had persistent hypoglycemia hence Lantus decreased to 15 hs and Humalog to 3 TID on 1/26  · Added 3 units at lunch in addition to ssi   · Blood sugars had improved but now increasing with improved oral intake  · Increase Lantus to 18 hs and Humalog to 5 TID  · Monitor blood sugars and adjust insulin accordingly      HLD (hyperlipidemia)  Assessment & Plan  · On Simvastatin 40 mg at home  · Begin equivalent Pravastatin on 1/26 if repeat CK normal (was 578 on admission)        VTE Pharmacologic Prophylaxis: VTE Score: 5 High Risk (Score >/= 5) - Pharmacological DVT Prophylaxis Ordered: heparin  Sequential Compression Devices Ordered  Patient Centered Rounds: I performed bedside rounds with nursing staff today    Discussions with Specialists or Other Care Team Provider: nursing     Education and Discussions with Family / Patient: patient reports he is going home (he is not going to therapy ) will need updated therapy recs to see if ok for home vs need for rehab   Time Spent for Care: 45 minutes  More than 50% of total time spent on counseling and coordination of care as described above  Current Length of Stay: 8 day(s)  Current Patient Status: Inpatient   Certification Statement: The patient will continue to require additional inpatient hospital stay due to pendign bed placement   Discharge Plan: Anticipate discharge in >72 hrs to rehab facility  no accepting facilities as of yet  Cm just reached out again today to all of them to [facility]  Pt does require work with PT per cm as pt has been refusing discussed with the pt     Code Status: Level 1 - Full Code    Subjective:   Patient is lying in bed  Denies any problems with eating denies shortness of Breath remains on 3 L chronic oxygen  Denies any nausea vomiting diarrhea  Reports for would like to go home rather live therapy as he feels he can manage  Objective:     Vitals:   Temp (24hrs), Av 3 °F (36 8 °C), Min:97 7 °F (36 5 °C), Max:98 7 °F (37 1 °C)    Temp:  [97 7 °F (36 5 °C)-98 7 °F (37 1 °C)] 97 7 °F (36 5 °C)  HR:  [65-97] 65  Resp:  [20] 20  BP: (116-134)/(43-58) 134/55  SpO2:  [95 %-100 %] 95 %  Body mass index is 32 29 kg/m²  Input and Output Summary (last 24 hours): Intake/Output Summary (Last 24 hours) at 2022 1140  Last data filed at 2022 1044  Gross per 24 hour   Intake 820 ml   Output 2280 ml   Net -1460 ml       Physical Exam:   Physical Exam  Constitutional:       General: He is not in acute distress  Appearance: He is obese  He is not ill-appearing, toxic-appearing or diaphoretic  HENT:      Head: Normocephalic and atraumatic  Mouth/Throat:      Pharynx: Oropharynx is clear  Eyes:      General:         Right eye: No discharge  Left eye: No discharge        Conjunctiva/sclera: Conjunctivae normal    Cardiovascular:      Rate and Rhythm: Normal rate  Heart sounds: No murmur heard  No friction rub  No gallop  Pulmonary:      Effort: No respiratory distress  Breath sounds: No stridor  No wheezing, rhonchi or rales  Chest:      Chest wall: No tenderness  Abdominal:      General: There is no distension  Palpations: Abdomen is soft  There is no mass  Tenderness: There is no abdominal tenderness  There is no right CVA tenderness, left CVA tenderness, guarding or rebound  Hernia: No hernia is present  Comments: round   Musculoskeletal:      Right lower leg: Edema present  Left lower leg: Edema present  Skin:     Coloration: Skin is not jaundiced or pale  Findings: No bruising, erythema, lesion or rash  Neurological:      Mental Status: He is alert and oriented to person, place, and time  Psychiatric:         Behavior: Behavior normal           Additional Data:     Labs:  Results from last 7 days   Lab Units 01/28/22 0444 01/25/22 0928 01/25/22  0928   WBC Thousand/uL  --   --  11 25*   HEMOGLOBIN g/dL 9 8*   < > 9 8*   HEMATOCRIT % 32 5*   < > 30 9*   PLATELETS Thousands/uL  --   --  272   NEUTROS PCT %  --   --  83*   LYMPHS PCT %  --   --  6*   MONOS PCT %  --   --  9   EOS PCT %  --   --  1    < > = values in this interval not displayed  Results from last 7 days   Lab Units 01/29/22 0452 01/28/22 0444 01/28/22 0444   SODIUM mmol/L 137   < > 139   POTASSIUM mmol/L 4 8   < > 4 8   CHLORIDE mmol/L 102   < > 102   CO2 mmol/L 30   < > 33*   BUN mg/dL 28*   < > 32*   CREATININE mg/dL 1 46*   < > 1 46*   ANION GAP mmol/L 5   < > 4   CALCIUM mg/dL 9 3   < > 8 6   ALBUMIN g/dL  --   --  2 0*   TOTAL BILIRUBIN mg/dL  --   --  0 24   ALK PHOS U/L  --   --  151*   ALT U/L  --   --  13   AST U/L  --   --  11   GLUCOSE RANDOM mg/dL 193*   < > 203*    < > = values in this interval not displayed           Results from last 7 days   Lab Units 01/29/22  0655 01/28/22  2054 01/28/22  1720 01/28/22  1136 01/28/22  0731 01/27/22  2113 01/27/22  1659 01/27/22  1121 01/27/22  0745 01/26/22  2204 01/26/22  2056 01/26/22  1647   POC GLUCOSE mg/dl 182* 267* 246* 220* 230* 216* 237* 155* 94 189* 182* 125         Results from last 7 days   Lab Units 01/24/22  0737 01/24/22  0434   PROCALCITONIN ng/ml 9 39* 6 86*       Lines/Drains:  Invasive Devices  Report    Peripheral Intravenous Line            Peripheral IV 12/15/21 Dorsal (posterior); Right Forearm 45 days    Peripheral IV 01/26/22 Right Antecubital 3 days                      Imaging: Reviewed radiology reports from this admission including: ultrasound(s)    Recent Cultures (last 7 days):         Last 24 Hours Medication List:   Current Facility-Administered Medications   Medication Dose Route Frequency Provider Last Rate    cephalexin  500 mg Oral 4x Daily Bao Lozano MD      cyclobenzaprine  10 mg Oral TID Taylor Acevedo DO      ferrous sulfate  325 mg Oral Daily With Breakfast Sam Councilman, MD      fluticasone-vilanterol  1 puff Inhalation Daily Sam Councilman, MD      heparin (porcine)  5,000 Units Subcutaneous [facility] Taylor Acevedo DO      insulin glargine  18 Units Subcutaneous HS Sam Councilman, MD      insulin lispro  1-5 Units Subcutaneous HS Sam Councilman, MD      insulin lispro  1-6 Units Subcutaneous TID [facility] Sam Councilman, MD      insulin lispro  3 Units Subcutaneous Daily With [employer]DE      insulin lispro  5 Units Subcutaneous TID With Meals Sam Councilman, MD      metoprolol tartrate  25 mg Oral BID Sam Councilman, MD      oxyCODONE-acetaminophen  1 5 tablet Oral Q4H PRN Taylor Acevedo DO      pravastatin  80 mg Oral Daily With Christiano Mcpherson MD      tiotropium  18 mcg Inhalation Daily Sam Councilman, MD          Today, Patient Was Seen By: YOLANDE Henry    **Please Note: This note may have been constructed using a voice recognition system  **

## 2022-01-29 NOTE — PROGRESS NOTES
Progress Note - Nephrology   Edel Zapata  78 y o  male MRN: [id number]  [facility] Encounter: [id number]      Assessment / Plan:  1  MARANDA on CKD stage 3, MARANDA likely d/t urosepsis, bacteremia leading to ATN  -b/l sCr 1 4-1 6, now at baseline, MARANDA resolved  -CKD likely d/t DM/HTN and follows with Dr Ryley Mosqueda outpatient  -monitor BMP    2  Hypercalcemia - corrected calcium 10 9 today  Will hold on further Diamox for now  If remains elevated, pursue further work up    3  LE edema likely d/t third spacing in setting of hypoalbuminemia  Weight improved s/p diamox  Hold further diuresis for today  4  Anemia, iron deficiency - Hgb 9 8, monitor CBC, transfuse prn    Other issues:  Klebsiella bacteremia, COPD, chronic diastolic CHF, type 2 diabetes mellitus, prostate carcinoma, history of lung cancer, chronic respiratory failure with hypoxia on chronic home oxygen    Subjective:   He denies chest pain or shortness of breath or leg edema  No complaints  Objective:     Vitals: Blood pressure 128/64, pulse 79, temperature 97 9 °F (36 6 °C), resp  rate 21, height 6' (1 829 m), weight 108 kg (238 lb 1 6 oz), SpO2 98 %  ,Body mass index is 32 29 kg/m²  Temp (24hrs), Av 1 °F (36 7 °C), Min:97 7 °F (36 5 °C), Max:98 6 °F (37 °C)      Weight (last 2 days)     Date/Time Weight    22 1100 108 (238 1)    22 1100 108 (238 98)            Intake/Output Summary (Last 24 hours) at 2022 1619  Last data filed at 2022 1349  Gross per 24 hour   Intake 1000 ml   Output 2930 ml   Net -1930 ml     I/O last 24 hours: In: 200 [P O :1240]  Out: 3680 [Urine:3680]        Physical Exam:   Physical Exam  Vitals reviewed  Constitutional:       General: He is not in acute distress  Appearance: Normal appearance  He is well-developed  He is not diaphoretic  Comments: Disheveled, sitting up at side of bed eating   HENT:      Head: Normocephalic and atraumatic        Mouth/Throat:      Pharynx: No oropharyngeal exudate  Eyes:      General: No scleral icterus  Right eye: No discharge  Left eye: No discharge  Neck:      Thyroid: No thyromegaly  Cardiovascular:      Rate and Rhythm: Normal rate and regular rhythm  Heart sounds: Normal heart sounds  Comments: B/l LE edema  Pulmonary:      Effort: Pulmonary effort is normal       Breath sounds: Normal breath sounds  No wheezing or rales  Abdominal:      General: Bowel sounds are normal  There is no distension  Palpations: Abdomen is soft  Tenderness: There is no abdominal tenderness  Musculoskeletal:         General: Swelling (b/l LE ) present  Normal range of motion  Cervical back: Neck supple  Lymphadenopathy:      Cervical: No cervical adenopathy  Skin:     General: Skin is warm and dry  Findings: No rash  Neurological:      General: No focal deficit present  Mental Status: He is alert  Comments: awake   Psychiatric:         Mood and Affect: Mood normal          Behavior: Behavior normal          Invasive Devices  Report    Peripheral Intravenous Line            Peripheral IV 12/15/21 Dorsal (posterior); Right Forearm 45 days    Peripheral IV 01/26/22 Right Antecubital 3 days                Medications:    Scheduled Meds:  Current Facility-Administered Medications   Medication Dose Route Frequency Provider Last Rate    cephalexin  500 mg Oral 4x Daily Paddy Sampson MD      cyclobenzaprine  10 mg Oral TID Cathryn Dawkins DO      ferrous sulfate  325 mg Oral Daily With Breakfast Johanna Matos MD      fluticasone-vilanterol  1 puff Inhalation Daily Johanna Matos MD      heparin (porcine)  5,000 Units Subcutaneous [facility] Cathryn Dawkins DO      insulin glargine  18 Units Subcutaneous HS Johanna Matos MD      insulin lispro  1-5 Units Subcutaneous HS Johanna Matos MD      insulin lispro  1-6 Units Subcutaneous TID [facility] Johanna Matos MD      insulin lispro  3 Units Subcutaneous Daily With Lunch YOLANDE Cobb      metoprolol tartrate  25 mg Oral BID Yomaira Leger MD      oxyCODONE-acetaminophen  1 5 tablet Oral Q4H PRN Mel Jacobs DO      pravastatin  80 mg Oral Daily With Mary Lou Holguin MD      tiotropium  18 mcg Inhalation Daily Yomaira Leger MD         PRN Meds: oxyCODONE-acetaminophen    Continuous Infusions:         LAB RESULTS:      Results from last 7 days   Lab Units 01/29/22 0452 01/28/22 0444 01/27/22 0444 01/26/22 0459 01/25/22 0928 01/25/22 0431 01/24/22 0434 01/23/22  0646   WBC Thousand/uL  --   --   --   --  11 25*  --  11 86* 17 37*   HEMOGLOBIN g/dL  --  9 8*  --   --  9 8*  --  9 9* 10 4*   HEMATOCRIT %  --  32 5*  --   --  30 9*  --  31 2* 33 1*   PLATELETS Thousands/uL  --   --   --   --  272  --  195 181   NEUTROS PCT %  --   --   --   --  83*  --  85* 85*   LYMPHS PCT %  --   --   --   --  6*  --  5* 4*   MONOS PCT %  --   --   --   --  9  --  8 9   EOS PCT %  --   --   --   --  1  --  1 1   POTASSIUM mmol/L 4 8 4 8 4 3 4 2  --  3 4* 3 6 3 7   CHLORIDE mmol/L 102 102 103 106  --  106 104 102   CO2 mmol/L 30 33* 31 29  --  26 26 24   BUN mg/dL 28* 32* 38* 44*  --  46* 60* 66*   CREATININE mg/dL 1 46* 1 46* 1 43* 1 70*  --  1 69* 2 12* 2 68*   CALCIUM mg/dL 9 3 8 6 8 8 8 6  --  8 1* 8 0* 8 4   ALK PHOS U/L  --  151*  --   --   --   --   --   --    ALT U/L  --  13  --   --   --   --   --   --    AST U/L  --  11  --   --   --   --   --   --    MAGNESIUM mg/dL  --   --   --   --   --  2 5  --   --    PHOSPHORUS mg/dL  --   --   --   --   --  3 1  --   --        CUTURES:  Lab Results   Component Value Date    BLOODCX Klebsiella pneumoniae (A) 01/21/2022    BLOODCX Staphylococcus coagulase negative (A) 01/21/2022    BLOODCX Staphylococcus coagulase negative (A) 01/21/2022    BLOODCX Globicatella sanguinis (A) 01/21/2022    BLOODCX No Growth After 5 Days  06/20/2018    BLOODCX No Growth After 5 Days   06/20/2018 BLOODCX No Growth After 5 Days  06/19/2018    URINECX >100,000 cfu/ml Klebsiella pneumoniae (A) 01/21/2022    URINECX >100,000 cfu/ml  09/11/2018    URINECX >100,000 cfu/ml Staphylococcus coagulase negative 12/03/2016                 Portions of the record may have been created with voice recognition software  Occasional wrong word or "sound a like" substitutions may have occurred due to the inherent limitations of voice recognition software  Read the chart carefully and recognize, using context, where substitutions have occurred  If you have any questions, please contact the dictating provider

## 2022-01-29 NOTE — ASSESSMENT & PLAN NOTE
Lab Results   Component Value Date    HGBA1C 9 2 (H) 04/14/2021       Recent Labs     01/28/22  1136 01/28/22  1720 01/28/22 2054 01/29/22  0655   POCGLU 220* 246* 267* 182*       Blood Sugar Average: Last 72 hrs:  · (P) 310 [id number]   · A1C poorly controlled, with hyperglycemia on admission  · Home regimen: U500 45 units before breakfast and lunch  · Was switched to basal/bolus on admission  · SSI with Accu-Cheks, carb controlled diet  · Hypoglycemia protocol  · Had persistent hypoglycemia hence Lantus decreased to 15 hs and Humalog to 3 TID on 1/26  · Added 3 units at lunch in addition to ssi   · Blood sugars had improved but now increasing with improved oral intake  · Increase Lantus to 18 hs and Humalog to 5 TID  · Monitor blood sugars and adjust insulin accordingly

## 2022-01-30 VITALS
HEIGHT: 72 IN | DIASTOLIC BLOOD PRESSURE: 72 MMHG | OXYGEN SATURATION: 96 % | HEART RATE: 97 BPM | RESPIRATION RATE: 18 BRPM | WEIGHT: 236.77 LBS | SYSTOLIC BLOOD PRESSURE: 158 MMHG | BODY MASS INDEX: 32.07 KG/M2 | TEMPERATURE: 97.5 F

## 2022-01-30 PROBLEM — E83.52 HYPERCALCEMIA: Status: RESOLVED | Noted: 2022-01-29 | Resolved: 2022-01-30

## 2022-01-30 PROBLEM — A41.9 SEPSIS (HCC): Status: RESOLVED | Noted: 2017-07-24 | Resolved: 2022-01-30

## 2022-01-30 PROBLEM — R78.81 BACTEREMIA: Status: RESOLVED | Noted: 2022-01-22 | Resolved: 2022-01-30

## 2022-01-30 LAB
ALBUMIN SERPL BCP-MCNC: 1.9 G/DL (ref 3.5–5)
ALP SERPL-CCNC: 102 U/L (ref 46–116)
ALT SERPL W P-5'-P-CCNC: 12 U/L (ref 12–78)
ANION GAP SERPL CALCULATED.3IONS-SCNC: 4 MMOL/L (ref 4–13)
AST SERPL W P-5'-P-CCNC: 10 U/L (ref 5–45)
BASOPHILS # BLD AUTO: 0.03 THOUSANDS/ΜL (ref 0–0.1)
BASOPHILS NFR BLD AUTO: 0 % (ref 0–1)
BILIRUB SERPL-MCNC: 0.54 MG/DL (ref 0.2–1)
BUN SERPL-MCNC: 25 MG/DL (ref 5–25)
CALCIUM ALBUM COR SERPL-MCNC: 10.1 MG/DL (ref 8.3–10.1)
CALCIUM SERPL-MCNC: 8.4 MG/DL (ref 8.3–10.1)
CHLORIDE SERPL-SCNC: 105 MMOL/L (ref 100–108)
CO2 SERPL-SCNC: 29 MMOL/L (ref 21–32)
CREAT SERPL-MCNC: 1.37 MG/DL (ref 0.6–1.3)
EOSINOPHIL # BLD AUTO: 0.09 THOUSAND/ΜL (ref 0–0.61)
EOSINOPHIL NFR BLD AUTO: 1 % (ref 0–6)
ERYTHROCYTE [DISTWIDTH] IN BLOOD BY AUTOMATED COUNT: 15 % (ref 11.6–15.1)
GFR SERPL CREATININE-BSD FRML MDRD: 50 ML/MIN/1.73SQ M
GLUCOSE SERPL-MCNC: 184 MG/DL (ref 65–140)
GLUCOSE SERPL-MCNC: 211 MG/DL (ref 65–140)
GLUCOSE SERPL-MCNC: 231 MG/DL (ref 65–140)
HCT VFR BLD AUTO: 29.3 % (ref 36.5–49.3)
HGB BLD-MCNC: 9 G/DL (ref 12–17)
IMM GRANULOCYTES # BLD AUTO: 0.18 THOUSAND/UL (ref 0–0.2)
IMM GRANULOCYTES NFR BLD AUTO: 2 % (ref 0–2)
LYMPHOCYTES # BLD AUTO: 0.95 THOUSANDS/ΜL (ref 0.6–4.47)
LYMPHOCYTES NFR BLD AUTO: 9 % (ref 14–44)
MCH RBC QN AUTO: 25.6 PG (ref 26.8–34.3)
MCHC RBC AUTO-ENTMCNC: 30.7 G/DL (ref 31.4–37.4)
MCV RBC AUTO: 83 FL (ref 82–98)
MONOCYTES # BLD AUTO: 0.73 THOUSAND/ΜL (ref 0.17–1.22)
MONOCYTES NFR BLD AUTO: 7 % (ref 4–12)
NEUTROPHILS # BLD AUTO: 8.31 THOUSANDS/ΜL (ref 1.85–7.62)
NEUTS SEG NFR BLD AUTO: 81 % (ref 43–75)
NRBC BLD AUTO-RTO: 0 /100 WBCS
PLATELET # BLD AUTO: 254 THOUSANDS/UL (ref 149–390)
PMV BLD AUTO: 9.5 FL (ref 8.9–12.7)
POTASSIUM SERPL-SCNC: 4.9 MMOL/L (ref 3.5–5.3)
PROT SERPL-MCNC: 6.7 G/DL (ref 6.4–8.2)
RBC # BLD AUTO: 3.52 MILLION/UL (ref 3.88–5.62)
SODIUM SERPL-SCNC: 138 MMOL/L (ref 136–145)
WBC # BLD AUTO: 10.29 THOUSAND/UL (ref 4.31–10.16)

## 2022-01-30 PROCEDURE — 99232 SBSQ HOSP IP/OBS MODERATE 35: CPT | Performed by: INTERNAL MEDICINE

## 2022-01-30 PROCEDURE — 82948 REAGENT STRIP/BLOOD GLUCOSE: CPT

## 2022-01-30 PROCEDURE — 99239 HOSP IP/OBS DSCHRG MGMT >30: CPT | Performed by: NURSE PRACTITIONER

## 2022-01-30 PROCEDURE — 80053 COMPREHEN METABOLIC PANEL: CPT | Performed by: NURSE PRACTITIONER

## 2022-01-30 PROCEDURE — 85025 COMPLETE CBC W/AUTO DIFF WBC: CPT | Performed by: NURSE PRACTITIONER

## 2022-01-30 RX ORDER — FUROSEMIDE 40 MG/1
40 TABLET ORAL DAILY
Qty: 30 TABLET | Refills: 0 | Status: SHIPPED | OUTPATIENT
Start: 2022-01-30 | End: 2022-05-17 | Stop reason: SDUPTHER

## 2022-01-30 RX ORDER — FERROUS SULFATE 325(65) MG
325 TABLET ORAL
Qty: 30 TABLET | Refills: 0 | Status: ON HOLD | OUTPATIENT
Start: 2022-01-31 | End: 2022-02-23 | Stop reason: SDUPTHER

## 2022-01-30 RX ORDER — CEPHALEXIN 500 MG/1
500 CAPSULE ORAL 4 TIMES DAILY
Qty: 10 CAPSULE | Refills: 0 | Status: SHIPPED | OUTPATIENT
Start: 2022-01-30 | End: 2022-02-02

## 2022-01-30 RX ORDER — POTASSIUM CHLORIDE 20 MEQ/1
20 TABLET, EXTENDED RELEASE ORAL DAILY
Qty: 60 TABLET | Refills: 0 | Status: SHIPPED | OUTPATIENT
Start: 2022-01-30 | End: 2022-05-17 | Stop reason: SDUPTHER

## 2022-01-30 RX ADMIN — INSULIN LISPRO 3 UNITS: 100 INJECTION, SOLUTION INTRAVENOUS; SUBCUTANEOUS at 12:28

## 2022-01-30 RX ADMIN — INSULIN LISPRO 2 UNITS: 100 INJECTION, SOLUTION INTRAVENOUS; SUBCUTANEOUS at 08:21

## 2022-01-30 RX ADMIN — FLUTICASONE FUROATE AND VILANTEROL TRIFENATATE 1 PUFF: 100; 25 POWDER RESPIRATORY (INHALATION) at 08:19

## 2022-01-30 RX ADMIN — METOPROLOL TARTRATE 25 MG: 25 TABLET, FILM COATED ORAL at 08:18

## 2022-01-30 RX ADMIN — CEPHALEXIN 500 MG: 500 CAPSULE ORAL at 12:30

## 2022-01-30 RX ADMIN — CEPHALEXIN 500 MG: 500 CAPSULE ORAL at 08:18

## 2022-01-30 RX ADMIN — FERROUS SULFATE TAB 325 MG (65 MG ELEMENTAL FE) 325 MG: 325 (65 FE) TAB at 08:18

## 2022-01-30 RX ADMIN — TIOTROPIUM BROMIDE 18 MCG: 18 CAPSULE ORAL; RESPIRATORY (INHALATION) at 08:19

## 2022-01-30 RX ADMIN — HEPARIN SODIUM 5000 UNITS: 5000 INJECTION INTRAVENOUS; SUBCUTANEOUS at 05:22

## 2022-01-30 RX ADMIN — CYCLOBENZAPRINE HYDROCHLORIDE 10 MG: 10 TABLET, FILM COATED ORAL at 08:18

## 2022-01-30 NOTE — PLAN OF CARE
Problem: MOBILITY - ADULT  Goal: Maintain or return to baseline ADL function  Description: INTERVENTIONS:  -  Assess patient's ability to carry out ADLs; assess patient's baseline for ADL function and identify physical deficits which impact ability to perform ADLs (bathing, care of mouth/teeth, toileting, grooming, dressing, etc )  - Assess/evaluate cause of self-care deficits   - Assess range of motion  - Assess patient's mobility; develop plan if impaired  - Assess patient's need for assistive devices and provide as appropriate  - Encourage maximum independence but intervene and supervise when necessary  - Involve family in performance of ADLs  - Assess for home care needs following discharge   - Consider OT consult to assist with ADL evaluation and planning for discharge  - Provide patient education as appropriate  Outcome: Progressing  Goal: Maintains/Returns to pre admission functional level  Description: INTERVENTIONS:  - Perform BMAT or MOVE assessment daily    - Set and communicate daily mobility goal to care team and patient/family/caregiver     - Collaborate with rehabilitation services on mobility goals if consulted  - Out of bed for toileting  - Record patient progress and toleration of activity level   Outcome: Progressing     Problem: PAIN - ADULT  Goal: Verbalizes/displays adequate comfort level or baseline comfort level  Description: Interventions:  - Encourage patient to monitor pain and request assistance  - Assess pain using appropriate pain scale  - Administer analgesics based on type and severity of pain and evaluate response  - Implement non-pharmacological measures as appropriate and evaluate response  - Consider cultural and social influences on pain and pain management  - Notify physician/advanced practitioner if interventions unsuccessful or patient reports new pain  Outcome: Progressing     Problem: INFECTION - ADULT  Goal: Absence or prevention of progression during hospitalization  Description: INTERVENTIONS:  - Assess and monitor for signs and symptoms of infection  - Monitor lab/diagnostic results  - Monitor all insertion sites, i e  indwelling lines, tubes, and drains  - Monitor endotracheal if appropriate and nasal secretions for changes in amount and color  - [place] appropriate cooling/warming therapies per order  - Administer medications as ordered  - Instruct and encourage patient and family to use good hand hygiene technique  - Identify and instruct in appropriate isolation precautions for identified infection/condition  Outcome: Progressing  Goal: Absence of fever/infection during neutropenic period  Description: INTERVENTIONS:  - Monitor WBC    Outcome: Progressing     Problem: SAFETY ADULT  Goal: Maintain or return to baseline ADL function  Description: INTERVENTIONS:  -  Assess patient's ability to carry out ADLs; assess patient's baseline for ADL function and identify physical deficits which impact ability to perform ADLs (bathing, care of mouth/teeth, toileting, grooming, dressing, etc )  - Assess/evaluate cause of self-care deficits   - Assess range of motion  - Assess patient's mobility; develop plan if impaired  - Assess patient's need for assistive devices and provide as appropriate  - Encourage maximum independence but intervene and supervise when necessary  - Involve family in performance of ADLs  - Assess for home care needs following discharge   - Consider OT consult to assist with ADL evaluation and planning for discharge  - Provide patient education as appropriate  Outcome: Progressing  Goal: Maintains/Returns to pre admission functional level  Description: INTERVENTIONS:  - Perform BMAT or MOVE assessment daily    - Set and communicate daily mobility goal to care team and patient/family/caregiver     - Collaborate with rehabilitation services on mobility goals if consulted  - Out of bed for toileting  - Record patient progress and toleration of activity level   Outcome: Progressing  Goal: Patient will remain free of falls  Description: INTERVENTIONS:  - Educate patient/family on patient safety including physical limitations  - Instruct patient to call for assistance with activity   - Consult OT/PT to assist with strengthening/mobility   - Keep Call bell within reach  - Keep bed low and locked with side rails adjusted as appropriate  - Keep care items and personal belongings within reach  - Initiate and maintain comfort rounds  - Make Fall Risk Sign visible to staff  - Offer Toileting every 2 Hours, in advance of need  - Obtain necessary fall risk management equipment  - Apply yellow socks and bracelet for high fall risk patients  - Consider moving patient to room near nurses station  Outcome: Progressing     Problem: DISCHARGE PLANNING  Goal: Discharge to home or other facility with appropriate resources  Description: INTERVENTIONS:  - Identify barriers to discharge w/patient and caregiver  - Arrange for needed discharge resources and transportation as appropriate  - Identify discharge learning needs (meds, wound care, etc )  - Arrange for interpretive services to assist at discharge as needed  - Refer to Case Management Department for coordinating discharge planning if the patient needs post-hospital services based on physician/advanced practitioner order or complex needs related to functional status, cognitive ability, or social support system  Outcome: Progressing     Problem: Knowledge Deficit  Goal: Patient/family/caregiver demonstrates understanding of disease process, treatment plan, medications, and discharge instructions  Description: Complete learning assessment and assess knowledge base    Interventions:  - Provide teaching at level of understanding  - Provide teaching via preferred learning methods  Outcome: Progressing     Problem: Potential for Falls  Goal: Patient will remain free of falls  Description: INTERVENTIONS:  - Educate patient/family on patient safety including physical limitations  - Instruct patient to call for assistance with activity   - Consult OT/PT to assist with strengthening/mobility   - Keep Call bell within reach  - Keep bed low and locked with side rails adjusted as appropriate  - Keep care items and personal belongings within reach  - Initiate and maintain comfort rounds  - Make Fall Risk Sign visible to staff  - Offer Toileting   - Obtain necessary fall risk management equipment  - Apply yellow socks and bracelet for high fall risk patients  - Consider moving patient to room near nurses station  Outcome: Progressing     Problem: Prexisting or High Potential for Compromised Skin Integrity  Goal: Skin integrity is maintained or improved  Description: INTERVENTIONS:  - Identify patients at risk for skin breakdown  - Assess and monitor skin integrity  - Assess and monitor nutrition and hydration status  - Monitor labs   - Assess for incontinence   - Turn and reposition patient  - Assist with mobility/ambulation  - Relieve pressure over bony prominences  - Avoid friction and shearing  - Provide appropriate hygiene as needed including keeping skin clean and dry  - Evaluate need for skin moisturizer/barrier cream  - Collaborate with interdisciplinary team   - Patient/family teaching  - Consider wound care consult   Outcome: Progressing

## 2022-01-30 NOTE — PROGRESS NOTES
Progress Note - Nephrology   Edel Zapata  78 y o  male MRN: [id number]  [facility] Encounter: [id number]      Assessment / Plan:  1  MARANDA on CKD stage 3, MARANDA likely d/t urosepsis, bacteremia leading to ATN  -b/l sCr 1 4-1 6, now at baseline, MARANDA resolved  -CKD likely d/t DM/HTN and follows with Dr Ryley Mosqueda outpatient  -appears euvolemic, hold diuretic upon d/c except for weight gain > 3-5 lbs in short period of time  -OK for d/c from renal perspective, message sent to office for f/u appt  -monitor BMP    2  Hypercalcemia - resolved    3  LE edema likely d/t third spacing in setting of hypoalbuminemia  Weight improved s/p diamox  Hold further diuresis for today  4  Anemia, iron deficiency - Hgb 9, monitor CBC, transfuse prn    Other issues:  Klebsiella bacteremia, COPD, chronic diastolic CHF, type 2 diabetes mellitus, prostate carcinoma, history of lung cancer, chronic respiratory failure with hypoxia on chronic home oxygen    Subjective:   He denies chest pain, shortness of breath or leg edema  No complaints         Objective:     Vitals: Blood pressure 158/72, pulse 97, temperature 97 5 °F (36 4 °C), resp  rate 18, height 6' (1 829 m), weight 107 kg (236 lb 12 4 oz), SpO2 96 %  ,Body mass index is 32 11 kg/m²  Temp (24hrs), Av 7 °F (36 5 °C), Min:97 5 °F (36 4 °C), Max:97 9 °F (36 6 °C)      Weight (last 2 days)     Date/Time Weight    22 1056 107 (236 77)    22 1100 108 (238 1)    22 1100 108 (238 98)            Intake/Output Summary (Last 24 hours) at 2022 1214  Last data filed at 2022 0827  Gross per 24 hour   Intake 840 ml   Output 1850 ml   Net -1010 ml     I/O last 24 hours: In: 1020 [P O :1020]  Out: 2550 [Urine:2550]        Physical Exam:   Physical Exam  Vitals reviewed  Constitutional:       General: He is not in acute distress  Appearance: Normal appearance  He is well-developed  He is not diaphoretic     HENT:      Head: Normocephalic and atraumatic  Nose: Nose normal       Mouth/Throat:      Mouth: Mucous membranes are moist       Pharynx: No oropharyngeal exudate  Eyes:      General: No scleral icterus  Right eye: No discharge  Left eye: No discharge  Neck:      Thyroid: No thyromegaly  Cardiovascular:      Rate and Rhythm: Normal rate and regular rhythm  Heart sounds: Normal heart sounds  Comments: No LE edema  Pulmonary:      Effort: Pulmonary effort is normal       Breath sounds: Normal breath sounds  No wheezing or rales  Abdominal:      General: Bowel sounds are normal  There is no distension  Palpations: Abdomen is soft  Tenderness: There is no abdominal tenderness  Musculoskeletal:         General: No swelling  Normal range of motion  Cervical back: Neck supple  Comments: Chronic venous stasis changes of b/l LE   Lymphadenopathy:      Cervical: No cervical adenopathy  Skin:     General: Skin is warm and dry  Findings: No rash  Neurological:      General: No focal deficit present  Mental Status: He is alert  Comments: awake   Psychiatric:         Mood and Affect: Mood normal          Behavior: Behavior normal          Invasive Devices  Report    Peripheral Intravenous Line            Peripheral IV 12/15/21 Dorsal (posterior); Right Forearm 46 days    Peripheral IV 01/29/22 Left Forearm <1 day                Medications:    Scheduled Meds:  Current Facility-Administered Medications   Medication Dose Route Frequency Provider Last Rate    cephalexin  500 mg Oral 4x Daily Linh Olivares MD      cyclobenzaprine  10 mg Oral TID Benson Flannery DO      ferrous sulfate  325 mg Oral Daily With Breakfast Santiago Torres MD      fluticasone-vilanterol  1 puff Inhalation Daily Santiago Torres MD      heparin (porcine)  5,000 Units Subcutaneous [facility] Benson Flannery DO      insulin glargine  18 Units Subcutaneous HS Santiago Torres MD      insulin lispro 1-5 Units Subcutaneous HS Raghu Ocampo MD      insulin lispro  1-6 Units Subcutaneous TID [facility] Raghu Ocampo MD      insulin lispro  3 Units Subcutaneous Daily With Lunch YOLANDE Newsome      metoprolol tartrate  25 mg Oral BID Raghu Ocampo MD      oxyCODONE-acetaminophen  1 5 tablet Oral Q4H PRN Sindy Gramajo DO      pravastatin  80 mg Oral Daily With Rola Tena MD      tiotropium  18 mcg Inhalation Daily Raghu Ocampo MD         PRN Meds: oxyCODONE-acetaminophen    Continuous Infusions:         LAB RESULTS:      Results from last 7 days   Lab Units 01/30/22  0733 01/29/22  0452 01/28/22 0444 01/27/22 0444 01/26/22 0459 01/25/22  0928 01/25/22  0431 01/24/22  0434   WBC Thousand/uL 10 29*  --   --   --   --  11 25*  --  11 86*   HEMOGLOBIN g/dL 9 0*  --  9 8*  --   --  9 8*  --  9 9*   HEMATOCRIT % 29 3*  --  32 5*  --   --  30 9*  --  31 2*   PLATELETS Thousands/uL 254  --   --   --   --  272  --  195   NEUTROS PCT % 81*  --   --   --   --  83*  --  85*   LYMPHS PCT % 9*  --   --   --   --  6*  --  5*   MONOS PCT % 7  --   --   --   --  9  --  8   EOS PCT % 1  --   --   --   --  1  --  1   POTASSIUM mmol/L 4 9 4 8 4 8 4 3 4 2  --  3 4* 3 6   CHLORIDE mmol/L 105 102 102 103 106  --  106 104   CO2 mmol/L 29 30 33* 31 29  --  26 26   BUN mg/dL 25 28* 32* 38* 44*  --  46* 60*   CREATININE mg/dL 1 37* 1 46* 1 46* 1 43* 1 70*  --  1 69* 2 12*   CALCIUM mg/dL 8 4 9 3 8 6 8 8 8 6  --  8 1* 8 0*   ALK PHOS U/L 102  --  151*  --   --   --   --   --    ALT U/L 12  --  13  --   --   --   --   --    AST U/L 10  --  11  --   --   --   --   --    MAGNESIUM mg/dL  --   --   --   --   --   --  2 5  --    PHOSPHORUS mg/dL  --   --   --   --   --   --  3 1  --        CUTURES:  Lab Results   Component Value Date    BLOODCX Klebsiella pneumoniae (A) 01/21/2022    BLOODCX Staphylococcus coagulase negative (A) 01/21/2022    BLOODCX Staphylococcus coagulase negative (A) 01/21/2022    BLOODCX Globicatella sanguinis (A) 01/21/2022    BLOODCX No Growth After 5 Days  06/20/2018    BLOODCX No Growth After 5 Days  06/20/2018    BLOODCX No Growth After 5 Days  06/19/2018    URINECX >100,000 cfu/ml Klebsiella pneumoniae (A) 01/21/2022    URINECX >100,000 cfu/ml  09/11/2018    URINECX >100,000 cfu/ml Staphylococcus coagulase negative 12/03/2016                 Portions of the record may have been created with voice recognition software  Occasional wrong word or "sound a like" substitutions may have occurred due to the inherent limitations of voice recognition software  Read the chart carefully and recognize, using context, where substitutions have occurred  If you have any questions, please contact the dictating provider

## 2022-01-30 NOTE — PLAN OF CARE
Problem: MOBILITY - ADULT  Goal: Maintain or return to baseline ADL function  Description: INTERVENTIONS:  -  Assess patient's ability to carry out ADLs; assess patient's baseline for ADL function and identify physical deficits which impact ability to perform ADLs (bathing, care of mouth/teeth, toileting, grooming, dressing, etc )  - Assess/evaluate cause of self-care deficits   - Assess range of motion  - Assess patient's mobility; develop plan if impaired  - Assess patient's need for assistive devices and provide as appropriate  - Encourage maximum independence but intervene and supervise when necessary  - Involve family in performance of ADLs  - Assess for home care needs following discharge   - Consider OT consult to assist with ADL evaluation and planning for discharge  - Provide patient education as appropriate  Outcome: Progressing  Goal: Maintains/Returns to pre admission functional level  Description: INTERVENTIONS:  - Perform BMAT or MOVE assessment daily    - Set and communicate daily mobility goal to care team and patient/family/caregiver  - Collaborate with rehabilitation services on mobility goals if consulted  - Perform Range of Motion 3 times a day  - Reposition patient every 2 hours    - Dangle patient 3 times a day  - Stand patient 3 times a day  - Ambulate patient 3 times a day  - Out of bed to chair 3 times a day   - Out of bed for meals 3 times a day  - Out of bed for toileting  - Record patient progress and toleration of activity level   Outcome: Progressing     Problem: PAIN - ADULT  Goal: Verbalizes/displays adequate comfort level or baseline comfort level  Description: Interventions:  - Encourage patient to monitor pain and request assistance  - Assess pain using appropriate pain scale  - Administer analgesics based on type and severity of pain and evaluate response  - Implement non-pharmacological measures as appropriate and evaluate response  - Consider cultural and social influences on pain and pain management  - Notify physician/advanced practitioner if interventions unsuccessful or patient reports new pain  Outcome: Progressing     Problem: INFECTION - ADULT  Goal: Absence or prevention of progression during hospitalization  Description: INTERVENTIONS:  - Assess and monitor for signs and symptoms of infection  - Monitor lab/diagnostic results  - Monitor all insertion sites, i e  indwelling lines, tubes, and drains  - Monitor endotracheal if appropriate and nasal secretions for changes in amount and color  - [place] appropriate cooling/warming therapies per order  - Administer medications as ordered  - Instruct and encourage patient and family to use good hand hygiene technique  - Identify and instruct in appropriate isolation precautions for identified infection/condition  Outcome: Progressing  Goal: Absence of fever/infection during neutropenic period  Description: INTERVENTIONS:  - Monitor WBC    Outcome: Progressing     Problem: SAFETY ADULT  Goal: Maintain or return to baseline ADL function  Description: INTERVENTIONS:  -  Assess patient's ability to carry out ADLs; assess patient's baseline for ADL function and identify physical deficits which impact ability to perform ADLs (bathing, care of mouth/teeth, toileting, grooming, dressing, etc )  - Assess/evaluate cause of self-care deficits   - Assess range of motion  - Assess patient's mobility; develop plan if impaired  - Assess patient's need for assistive devices and provide as appropriate  - Encourage maximum independence but intervene and supervise when necessary  - Involve family in performance of ADLs  - Assess for home care needs following discharge   - Consider OT consult to assist with ADL evaluation and planning for discharge  - Provide patient education as appropriate  Outcome: Progressing  Goal: Maintains/Returns to pre admission functional level  Description: INTERVENTIONS:  - Perform BMAT or MOVE assessment daily    - Set and communicate daily mobility goal to care team and patient/family/caregiver  - Collaborate with rehabilitation services on mobility goals if consulted  - Perform Range of Motion 3 times a day  - Reposition patient every 2 hours    - Dangle patient 3 times a day  - Stand patient 3 times a day  - Ambulate patient 3 times a day  - Out of bed to chair 3 times a day   - Out of bed for meals 3 times a day  - Out of bed for toileting  - Record patient progress and toleration of activity level   Outcome: Progressing  Goal: Patient will remain free of falls  Description: INTERVENTIONS:  - Educate patient/family on patient safety including physical limitations  - Instruct patient to call for assistance with activity   - Consult OT/PT to assist with strengthening/mobility   - Keep Call bell within reach  - Keep bed low and locked with side rails adjusted as appropriate  - Keep care items and personal belongings within reach  - Initiate and maintain comfort rounds  - Make Fall Risk Sign visible to staff  - Offer Toileting every 2 Hours, in advance of need  - Initiate/Maintain alarm  - Obtain necessary fall risk management equipment:   - Apply yellow socks and bracelet for high fall risk patients  - Consider moving patient to room near nurses station  Outcome: Progressing     Problem: DISCHARGE PLANNING  Goal: Discharge to home or other facility with appropriate resources  Description: INTERVENTIONS:  - Identify barriers to discharge w/patient and caregiver  - Arrange for needed discharge resources and transportation as appropriate  - Identify discharge learning needs (meds, wound care, etc )  - Arrange for interpretive services to assist at discharge as needed  - Refer to Case Management Department for coordinating discharge planning if the patient needs post-hospital services based on physician/advanced practitioner order or complex needs related to functional status, cognitive ability, or social support system  Outcome: Progressing     Problem: Knowledge Deficit  Goal: Patient/family/caregiver demonstrates understanding of disease process, treatment plan, medications, and discharge instructions  Description: Complete learning assessment and assess knowledge base    Interventions:  - Provide teaching at level of understanding  - Provide teaching via preferred learning methods  Outcome: Progressing     Problem: Potential for Falls  Goal: Patient will remain free of falls  Description: INTERVENTIONS:  - Educate patient/family on patient safety including physical limitations  - Instruct patient to call for assistance with activity   - Consult OT/PT to assist with strengthening/mobility   - Keep Call bell within reach  - Keep bed low and locked with side rails adjusted as appropriate  - Keep care items and personal belongings within reach  - Initiate and maintain comfort rounds  - Make Fall Risk Sign visible to staff  - Offer Toileting every 2 Hours, in advance of need  - Initiate/Maintain alarm  - Obtain necessary fall risk management equipment:   - Apply yellow socks and bracelet for high fall risk patients  - Consider moving patient to room near nurses station  Outcome: Progressing     Problem: Prexisting or High Potential for Compromised Skin Integrity  Goal: Skin integrity is maintained or improved  Description: INTERVENTIONS:  - Identify patients at risk for skin breakdown  - Assess and monitor skin integrity  - Assess and monitor nutrition and hydration status  - Monitor labs   - Assess for incontinence   - Turn and reposition patient  - Assist with mobility/ambulation  - Relieve pressure over bony prominences  - Avoid friction and shearing  - Provide appropriate hygiene as needed including keeping skin clean and dry  - Evaluate need for skin moisturizer/barrier cream  - Collaborate with interdisciplinary team   - Patient/family teaching  - Consider wound care consult   Outcome: Progressing

## 2022-01-30 NOTE — PLAN OF CARE
Problem: MOBILITY - ADULT  Goal: Maintain or return to baseline ADL function  Description: INTERVENTIONS:  -  Assess patient's ability to carry out ADLs; assess patient's baseline for ADL function and identify physical deficits which impact ability to perform ADLs (bathing, care of mouth/teeth, toileting, grooming, dressing, etc )  - Assess/evaluate cause of self-care deficits   - Assess range of motion  - Assess patient's mobility; develop plan if impaired  - Assess patient's need for assistive devices and provide as appropriate  - Encourage maximum independence but intervene and supervise when necessary  - Involve family in performance of ADLs  - Assess for home care needs following discharge   - Consider OT consult to assist with ADL evaluation and planning for discharge  - Provide patient education as appropriate  1/30/2022 1446 by Zion Kilgore RN  Outcome: Completed  1/30/2022 0826 by Zion Kilgore RN  Outcome: Progressing  Goal: Maintains/Returns to pre admission functional level  Description: INTERVENTIONS:  - Perform BMAT or MOVE assessment daily    - Set and communicate daily mobility goal to care team and patient/family/caregiver  - Collaborate with rehabilitation services on mobility goals if consulted  - Perform Range of Motion 3 times a day  - Reposition patient every 2 hours    - Dangle patient 3 times a day  - Stand patient 3 times a day  - Ambulate patient 3 times a day  - Out of bed to chair 3 times a day   - Out of bed for meals 3 times a day  - Out of bed for toileting  - Record patient progress and toleration of activity level   1/30/2022 1446 by Zion Kilgore RN  Outcome: Completed  1/30/2022 0826 by Zion Kilgore RN  Outcome: Progressing     Problem: PAIN - ADULT  Goal: Verbalizes/displays adequate comfort level or baseline comfort level  Description: Interventions:  - Encourage patient to monitor pain and request assistance  - Assess pain using appropriate pain scale  - Administer analgesics based on type and severity of pain and evaluate response  - Implement non-pharmacological measures as appropriate and evaluate response  - Consider cultural and social influences on pain and pain management  - Notify physician/advanced practitioner if interventions unsuccessful or patient reports new pain  1/30/2022 1446 by Francoise Malloy RN  Outcome: Completed  1/30/2022 0826 by Francoise Malloy RN  Outcome: Progressing     Problem: INFECTION - ADULT  Goal: Absence or prevention of progression during hospitalization  Description: INTERVENTIONS:  - Assess and monitor for signs and symptoms of infection  - Monitor lab/diagnostic results  - Monitor all insertion sites, i e  indwelling lines, tubes, and drains  - Monitor endotracheal if appropriate and nasal secretions for changes in amount and color  - [place] appropriate cooling/warming therapies per order  - Administer medications as ordered  - Instruct and encourage patient and family to use good hand hygiene technique  - Identify and instruct in appropriate isolation precautions for identified infection/condition  1/30/2022 1446 by Francoise Malloy RN  Outcome: Completed  1/30/2022 0826 by Francoise Malloy RN  Outcome: Progressing  Goal: Absence of fever/infection during neutropenic period  Description: INTERVENTIONS:  - Monitor WBC    1/30/2022 1446 by Francoise Malloy RN  Outcome: Completed  1/30/2022 0826 by Francoise Malloy RN  Outcome: Progressing     Problem: SAFETY ADULT  Goal: Maintain or return to baseline ADL function  Description: INTERVENTIONS:  -  Assess patient's ability to carry out ADLs; assess patient's baseline for ADL function and identify physical deficits which impact ability to perform ADLs (bathing, care of mouth/teeth, toileting, grooming, dressing, etc )  - Assess/evaluate cause of self-care deficits   - Assess range of motion  - Assess patient's mobility; develop plan if impaired  - Assess patient's need for assistive devices and provide as appropriate  - Encourage maximum independence but intervene and supervise when necessary  - Involve family in performance of ADLs  - Assess for home care needs following discharge   - Consider OT consult to assist with ADL evaluation and planning for discharge  - Provide patient education as appropriate  1/30/2022 1446 by Shahriar New RN  Outcome: Completed  1/30/2022 0826 by Shahriar New RN  Outcome: Progressing  Goal: Maintains/Returns to pre admission functional level  Description: INTERVENTIONS:  - Perform BMAT or MOVE assessment daily    - Set and communicate daily mobility goal to care team and patient/family/caregiver  - Collaborate with rehabilitation services on mobility goals if consulted  - Perform Range of Motion 3 times a day  - Reposition patient every 2 hours    - Dangle patient 3 times a day  - Stand patient 3 times a day  - Ambulate patient 3 times a day  - Out of bed to chair 3 times a day   - Out of bed for meals 3 times a day  - Out of bed for toileting  - Record patient progress and toleration of activity level   1/30/2022 1446 by Shahriar New RN  Outcome: Completed  1/30/2022 0826 by Shahriar New RN  Outcome: Progressing  Goal: Patient will remain free of falls  Description: INTERVENTIONS:  - Educate patient/family on patient safety including physical limitations  - Instruct patient to call for assistance with activity   - Consult OT/PT to assist with strengthening/mobility   - Keep Call bell within reach  - Keep bed low and locked with side rails adjusted as appropriate  - Keep care items and personal belongings within reach  - Initiate and maintain comfort rounds  - Make Fall Risk Sign visible to staff  - Offer Toileting every 2 Hours, in advance of need  - Initiate/Maintain alarm  - Obtain necessary fall risk management equipment:   - Apply yellow socks and bracelet for high fall risk patients  - Consider moving patient to room near nurses station  1/30/2022 1446 by Milana Miller RN  Outcome: Completed  1/30/2022 0826 by Milana Miller RN  Outcome: Progressing     Problem: DISCHARGE PLANNING  Goal: Discharge to home or other facility with appropriate resources  Description: INTERVENTIONS:  - Identify barriers to discharge w/patient and caregiver  - Arrange for needed discharge resources and transportation as appropriate  - Identify discharge learning needs (meds, wound care, etc )  - Arrange for interpretive services to assist at discharge as needed  - Refer to Case Management Department for coordinating discharge planning if the patient needs post-hospital services based on physician/advanced practitioner order or complex needs related to functional status, cognitive ability, or social support system  1/30/2022 1446 by Milana Miller RN  Outcome: Completed  1/30/2022 0826 by Milana Miller RN  Outcome: Progressing     Problem: Knowledge Deficit  Goal: Patient/family/caregiver demonstrates understanding of disease process, treatment plan, medications, and discharge instructions  Description: Complete learning assessment and assess knowledge base    Interventions:  - Provide teaching at level of understanding  - Provide teaching via preferred learning methods  1/30/2022 1446 by Milana Miller RN  Outcome: Completed  1/30/2022 0826 by Milana Miller RN  Outcome: Progressing     Problem: Potential for Falls  Goal: Patient will remain free of falls  Description: INTERVENTIONS:  - Educate patient/family on patient safety including physical limitations  - Instruct patient to call for assistance with activity   - Consult OT/PT to assist with strengthening/mobility   - Keep Call bell within reach  - Keep bed low and locked with side rails adjusted as appropriate  - Keep care items and personal belongings within reach  - Initiate and maintain comfort rounds  - Make Fall Risk Sign visible to staff  - Offer Toileting every  Hours, in advance of need  - Initiate/Maintain alarm  - Obtain necessary fall risk management equipment:   - Apply yellow socks and bracelet for high fall risk patients  - Consider moving patient to room near nurses station  1/30/2022 1446 by Susan Rodríguez RN  Outcome: Completed  1/30/2022 0826 by Susan Rodríguez RN  Outcome: Progressing     Problem: Prexisting or High Potential for Compromised Skin Integrity  Goal: Skin integrity is maintained or improved  Description: INTERVENTIONS:  - Identify patients at risk for skin breakdown  - Assess and monitor skin integrity  - Assess and monitor nutrition and hydration status  - Monitor labs   - Assess for incontinence   - Turn and reposition patient  - Assist with mobility/ambulation  - Relieve pressure over bony prominences  - Avoid friction and shearing  - Provide appropriate hygiene as needed including keeping skin clean and dry  - Evaluate need for skin moisturizer/barrier cream  - Collaborate with interdisciplinary team   - Patient/family teaching  - Consider wound care consult   1/30/2022 1446 by Susan Rodríguez RN  Outcome: Completed  1/30/2022 0826 by Susan Rodríguez RN  Outcome: Progressing

## 2022-01-30 NOTE — DISCHARGE SUMMARY
[facility]  Discharge- Pavel Webster Sr  1945, 78 y o  male MRN: [id number]  Unit/Bed#: [facility] 603-01 Encounter: [id number]  Primary Care Provider: Ania Rico MD   Date and time admitted to hospital: 1/20/2022 11:50 PM    * Sepsis [facility])  Assessment & Plan  · POA with tachycardia, leucocytosis  · Presented to the ED on 1/21/22 after he fell out of bed due to weakness  · Noted to have Klebsiella UTI and bacteremia  · Received Ceftriaxone and Cefepime initially - on cefazolin from 1/23 and changed to Cephalexin from 1/26 today day # 4 of 7  · ID following - plan to continue Cephalexin for total 7 days  · ID input appreciated      Adenocarcinoma of lung ([facility] )  Assessment & Plan  · 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  · Completed SBRT to RUL on 12/31/20  · Last seen by radiation oncology on 9/14/21  · He had CTA study on July 23, 2021 - "Stable right lung volume loss secondary to areas of rounded atelectasis at the right middle and lower lobes  Small/moderate size right pleural effusion "  · CXR this admission - "Stable right lung volume loss with known right lung base round atelectasis and loculated pleural effusion "  · Recommended to have repeat CT chest January 2022 and f/u as outpatient will order one on Monday since pt remains in the hospital   · Continue to recommend outpatient f/u            COPD, severe ([facility] )  Assessment & Plan  Not in acute exacerbation  · Home Trelegy nonformulary - replaced with equivalent Breo, Spiriva    Chronic diastolic heart failure (HCC)  Assessment & Plan  Wt Readings from Last 3 Encounters:   01/30/22 107 kg (236 lb 12 4 oz)   12/16/21 104 kg (229 lb 11 5 oz)   07/25/21 111 kg (244 lb 12 8 oz)     · Recent admission with acute on chronic heart failure     · Diuretics per MARANDA  · Monitor daily weights, I/O, volume status  · Low-sodium, fluid-restricted diet; patient admitted to some recent dietary indiscretion thus counseling provided  · Diuretic plan per MARANDA        CKD (chronic kidney disease) stage 3, GFR 30-59 ml/min [facility])  Assessment & Plan  Lab Results   Component Value Date    EGFR 50 01/30/2022    EGFR 46 01/29/2022    EGFR 46 01/28/2022    CREATININE 1 37 (H) 01/30/2022    CREATININE 1 46 (H) 01/29/2022    CREATININE 1 46 (H) 01/28/2022       History of prostate cancer  Assessment & Plan  · H/o adenocarcinoma of the prostate   · S/p prostate seed brachytherapy followed by pelvic radiation in 2007  · Outpatient follow-up      UTI (urinary tract infection)  Assessment & Plan  · Plan as above    Bacteremia  Assessment & Plan  · 1 out of 2 blood cultures from admission with Klebsiella pneumoniae - from UTI - sensitive to Cefazolin - antibiotics as above  · Second blood culture with coagulase negative Staphylococcus - contaminant    Fracture of navicular bone of left foot  Assessment & Plan  Recent, December 2021  · Seen by ortho, was WBAT LLE CAM boot  · Recommended outpatient f/u 4 weeks which he has not done   Has not been wearing boot  · Team spoke with ortho on 1/21 recommended to check xray  · Xray shows healing fracture with stable alignment  · Will recommend continued WBAT and outpatient ortho f/u    Chronic respiratory failure with hypoxia ([facility] )  Assessment & Plan  On 3 L NC continuously at baseline  · Continue supplemental oxygen and titrate as needed to maintain SaO2 88-94%  · CXR with stable findings     Anemia, iron deficiency  Assessment & Plan  · Drop in Hb from 12 2 on 1/21 to 9 8 on 1/25  · No overt bleeding  · Iron studies suggestive of iron deficiency anemia  · Hb stable at present  · F/u hemoccult  · Begun on FeSO4 on 1/27  · IV iron after infection treated    Essential hypertension  Assessment & Plan  · Home medication Metoprolol tartrate was continued at increased dose(50 mg BID) due to tachycardia   · Diuretics held due to MARANDA  · BP now on the lower side  · Decrease Metoprolol tartrate to 25 mg BID  · Monitor BP    Type 2 diabetes mellitus with hyperglycemia, with long-term current use of insulin [facility])  Assessment & Plan  Lab Results   Component Value Date    HGBA1C 9 2 (H) 04/14/2021       Recent Labs     01/29/22  1709 01/29/22  2112 01/30/22  0822 01/30/22  1105   POCGLU 207* 279* 211* 231*       Blood Sugar Average: Last 72 hrs:  · (P) 212 [id number]   · A1C poorly controlled, with hyperglycemia on admission  · Home regimen: U500 45 units before breakfast and lunch  · Was switched to basal/bolus on admission  · SSI with Accu-Cheks, carb controlled diet  · Hypoglycemia protocol  · Had persistent hypoglycemia hence Lantus decreased to 15 hs and Humalog to 3 TID on 1/26  · Added 3 units at lunch in addition to ssi   · Blood sugars had improved but now increasing with improved oral intake  · Increase Lantus to 18 hs and Humalog to 5 TID  · Monitor blood sugars and adjust insulin accordingly      HLD (hyperlipidemia)  Assessment & Plan  · On Simvastatin 40 mg at home  · Begin equivalent Pravastatin on 1/26 if repeat CK normal (was 578 on admission)        Medical Problems             Resolved Problems  Date Reviewed: 1/30/2022          Resolved    Hyponatremia 1/26/2022     Resolved by  YOLANDE Wheeler    Acute kidney injury superimposed on chronic kidney disease-3 ([facility] ) 1/29/2022     Resolved by  Nimo Subramanian DO              Discharging Physician / Practitioner: YOLANDE Pickering  PCP: Ceferino Marsh MD  Admission Date:   Admission Orders (From admission, onward)     Ordered        01/21/22 0316  Inpatient Admission  Once                      Discharge Date: 01/30/22    Consultations During Hospital Stay:  · ID - Dr Katey Wall  · Nephrology - Dr Dionne Miner    Procedures Performed:   · Bun /creatin 1 37 on 1/30/22  · Urine culture on 1/21/22: > 21003ccr/ml Klebsiella  · Blood cultures 1/21/22 positive for klebsiella pneumoniae  · 2nd blood culture on 1/21/22 staph coag neg and globicatella sanguinis  · Covid /flu/rsv 2/21/22 negative  · Chest xray portable 1/21: Stable right lung volume loss with known right lung base round atelectasis and loculated pleural effusion  · Ct head wo contrast 1/21:Anterior subluxation of the left of the temporomandibular joint is questioned; no calvarial fracture or acute intracranial abnormality is seen otherwise      Other findings as above  · Xray ankle 1/22:Healing avulsion fracture of the navicular with stable alignment  · Us kidney and bladder 1/21:No hydronephrosis   Prominence of the right renal pelvis without any calyceal reticulation, unchanged from the previous study     Significant Findings / Test Results:   · See above     Incidental Findings:   · none    Test Results Pending at Discharge (will require follow up): · None      Outpatient Tests Requested:  · Call to schedule follow any with pcp in one week   · Follow up with nephrology call to schedule follow up and bmp in a week from discharge     Complications:  none    Reason for Admission:     Hospital Course:   Pawan Addison Sr  is a 78 y o  male patient who originally presented to the hospital on 1/20/2022 due to generalized weakness  Pt presented with hx of chronic diastolic heart failure , severe COPD, chronic hypoxic resp failure on continuous 3 liters NC, type 2 dm,htm  Reports his weakness have been increasing for approximately 1 week also reporting increasing urinary frequency without dysuria/hematuria no fevers or chills or abdominal pain nausea vomiting diarrhea during that time  Patient denied any chest pain pressure worsening shortness of breath cough wheezing worsening from his baseline or headache  Day of admission patient reports having fallen out of his bed was unable to rise due to the weakness which prompted a call to EMS    Upon the emergency rooms arrival he was tachycardic tachypneic labs revealed some leukocytosis acute renal failure thus a septic workup was initiated were patient's urinalysis was noted to have pyuria bacteriuria for which patient was started on ceftriaxone  A COVID/influenza/RSV PCR was negative chest x-ray was not consistent with pneumonia  He was then admitted for further management of sepsis currently suspected secondary to urinary tract infection  Patient was seen by Nephrology for MARANDA likely secondary to prerenal etiology in the setting of urosepsis  Baseline creatinine 1 4-1 6 mg/dL with the admitting creatinine of 2 7 mg/dL  Patient was also noted to have metabolic alkalosis likely secondary to volume depletion  Patient noted to be on diuretics at home and on my discussions with patient he seems rather emphatic that he needs to take his diuretic to keep his swelling in his legs down  Patient was also seen by infectious disease since patient's blood cultures returned positive for Klebsiella pneumoniae  Patient's urine culture positive for Klebsiella pneumoniae noting patient also had 2nd blood culture returned with some Gram-positive cocci isolates this suspected to be likely contaminant  Eventually patient was transitioned to cephalexin 500 mg q i d  to complete a course of 7 days total   Upon discharge patient's scripts were sent to his pharmacy for pickup  Patient understands that he should be taking full course of antibiotic  Discussion was also had in regards to holding his diuretic and only taking with a 3 lb weight gain  During my interactions with the patient on feel that patient may not be compliant with this as he is rather emphatic that he still has lower extremity edema and then taking the diuretic is the only help with this  Patient was educated on the fact that we still had to continue to monitor his renal function and that not aggressively diuresing was helping his kidneys  Discussion was had with Nephrology they still recommended patient treat 3 lb weight gain with diuretic as needed and take potassium on day of taking diuretic  Patient was discharged with visiting nurses for follow-up and patient will need to follow-up with his PCP within the next week as well as follow-up with Nephrology to call schedule an appointment  Patient was discharged with a script for BMP results to primary care and Nephrology  Patient was evaluated by Physical therapy with recommendations for rehab however patient was persistent and saying that he wanted to return home  Eventually patient was discharged home at his request with visiting nurses for follow-up  Please see above list of diagnoses and related plan for additional information  Condition at Discharge: fair    Discharge Day Visit / Exam:   Subjective:  Pt is doing well reports he is eager to go home does not want to go to a rehab  He understands and knows his medications he needs to take we spoke about completing his abx he has no acute pain right now he is eating  Vitals: Blood Pressure: 158/72 (01/30/22 0817)  Pulse: 97 (01/30/22 0817)  Temperature: 97 5 °F (36 4 °C) (01/30/22 0817)  Temp Source: Oral (01/28/22 0731)  Respirations: 18 (01/30/22 0817)  Height: 6' (182 9 cm) (01/21/22 1240)  Weight - Scale: 107 kg (236 lb 12 4 oz) (01/30/22 1056)  SpO2: 96 % (01/30/22 0817)  Exam:   Physical Exam  Constitutional:       General: He is not in acute distress  Appearance: He is obese  He is not ill-appearing, toxic-appearing or diaphoretic  HENT:      Head: Normocephalic and atraumatic  Nose: No congestion  Mouth/Throat:      Pharynx: Oropharynx is clear  No oropharyngeal exudate or posterior oropharyngeal erythema  Cardiovascular:      Rate and Rhythm: Normal rate  Heart sounds: No murmur heard  No friction rub  No gallop  Pulmonary:      Effort: No respiratory distress  Breath sounds: No stridor  No wheezing, rhonchi or rales  Chest:      Chest wall: No tenderness  Abdominal:      General: Abdomen is flat  There is no distension        Palpations: Abdomen is soft  There is no mass  Tenderness: There is no abdominal tenderness  There is no right CVA tenderness, left CVA tenderness, guarding or rebound  Hernia: No hernia is present  Musculoskeletal:         General: No swelling, tenderness, deformity or signs of injury  Right lower leg: No edema  Left lower leg: No edema  Skin:     Coloration: Skin is not jaundiced or pale  Findings: No bruising, erythema, lesion or rash  Neurological:      Mental Status: He is alert and oriented to person, place, and time  Psychiatric:         Behavior: Behavior normal           Discussion with Family: Patient declined call to   Discharge instructions/Information to patient and family:   See after visit summary for information provided to patient and family  Provisions for Follow-Up Care:  See after visit summary for information related to follow-up care and any pertinent home health orders  Disposition:   Home with VNA Services (Reminder: Complete face to face encounter)    Planned Readmission: no plan for readmission      Discharge Statement:  I spent 42 minutes discharging the patient  This time was spent on the day of discharge  I had direct contact with the patient on the day of discharge  Greater than 50% of the total time was spent examining patient, answering all patient questions, arranging and discussing plan of care with patient as well as directly providing post-discharge instructions  Additional time then spent on discharge activities  Discharge Medications:  See after visit summary for reconciled discharge medications provided to patient and/or family        **Please Note: This note may have been constructed using a voice recognition system**

## 2022-01-30 NOTE — ASSESSMENT & PLAN NOTE
Wt Readings from Last 3 Encounters:   01/30/22 107 kg (236 lb 12 4 oz)   12/16/21 104 kg (229 lb 11 5 oz)   07/25/21 111 kg (244 lb 12 8 oz)     · Recent admission with acute on chronic heart failure     · Diuretics per MARANDA  · Monitor daily weights, I/O, volume status  · Low-sodium, fluid-restricted diet; patient admitted to some recent dietary indiscretion thus counseling provided  · Diuretic plan per MARANDA

## 2022-01-30 NOTE — ASSESSMENT & PLAN NOTE
Lab Results   Component Value Date    EGFR 50 01/30/2022    EGFR 46 01/29/2022    EGFR 46 01/28/2022    CREATININE 1 37 (H) 01/30/2022    CREATININE 1 46 (H) 01/29/2022    CREATININE 1 46 (H) 01/28/2022

## 2022-01-30 NOTE — CASE MANAGEMENT
Case Management Discharge Planning Note    Patient name Stephanie Ybarra  Location [facility] 216-85 MRN [id number]  : 1945 Date 2022       Current Admission Date: 2022  Current Admission Diagnosis:Sepsis [facility])   Patient Active Problem List    Diagnosis Date Noted    CKD (chronic kidney disease) stage 3, GFR 30-59 ml/min ([facility] ) 2022    Hypercalcemia 2022    History of prostate cancer 2022    UTI (urinary tract infection) 2022    Bacteremia 2022    Adenocarcinoma of lung ([facility] ) 2022    Fracture of navicular bone of left foot 2021    Chronic respiratory failure with hypoxia ([facility] ) 04/15/2021    [facility], morbid [facility] 2021    PAD (peripheral artery disease) ([facility] ) 2021    History of lung cancer 2020    DDD (degenerative disc disease), lumbar 2020    Anemia, iron deficiency 2020    Lung nodule 2020    Pulmonary hypertension ([facility] ) 2019    JACQUELINE (obstructive sleep apnea) 2019    COPD with acute exacerbation ([facility] ) 2019    Oxygen dependent 2018    RBBB 2018    CAD (coronary artery disease) 2018    Chronic diastolic heart failure ([facility] ) 2018    Pleural effusion on right 10/31/2017    Sepsis ([facility] ) 2017    COPD, severe ([facility] ) 2017    Diabetic neuropathy ([facility] ) 2017    Essential hypertension 2016    Venous stasis dermatitis of both lower extremities 11/15/2016    Type 2 diabetes mellitus with hyperglycemia, with long-term current use of insulin ([facility] ) 2016    Obesity (BMI 30-39 9) 2016    HLD (hyperlipidemia) 2016      LOS (days): 9  Geometric Mean LOS (GMLOS) (days): 4 80  Days to GMLOS:-4 5     OBJECTIVE:  Risk of Unplanned Readmission Score: 29         Current admission status: Inpatient   Preferred Pharmacy:   [facility], [address] [place], [place] [address]  Phone: [phone] Fax: [phone]    Primary Care Provider: Ceferino Marsh MD    Primary Insurance: [facility]  Secondary Insurance:     DISCHARGE DETAILS:    Discharge planning discussed with[de-identified] Patient  Freedom of Choice: Yes     Contacts  Patient Contacts: Priyanka Santo  Relationship to Patient[de-identified] Family  Contact Method: Phone  Phone Number: [phone]  Reason/Outcome: Continuity of [address]         Is the patient interested in Lu Antunez at discharge?: Yes  [address] requested[de-identified] [address] Name[de-identified] Other ([address] [place] [address])  [address] [place] [address] Provider[de-identified] PCP  Home Health Services Needed[de-identified] Evaluate Functional Status and Safety,Strengthening/Theraputic Exercises to Improve Function,Other (comment) (medication management)  Homebound Criteria Met[de-identified] Requires the Assistance of Another Person for Safe Ambulation or to Leave the Home,Uses an Assist Device (i e  cane, walker, etc)  Supporting Clincal Findings[de-identified] Fatigues Easliy in Short Distances,Limited Endurance     Son aware that patient is refusing rehab, and is agreeable to home health and discharge home

## 2022-01-30 NOTE — ASSESSMENT & PLAN NOTE
Lab Results   Component Value Date    HGBA1C 9 2 (H) 04/14/2021       Recent Labs     01/29/22  1709 01/29/22  2112 01/30/22  0822 01/30/22  1105   POCGLU 207* 279* 211* 231*       Blood Sugar Average: Last 72 hrs:  · (P) 212 [id number]   · A1C poorly controlled, with hyperglycemia on admission  · Home regimen: U500 45 units before breakfast and lunch  · Was switched to basal/bolus on admission  · SSI with Accu-Cheks, carb controlled diet  · Hypoglycemia protocol  · Had persistent hypoglycemia hence Lantus decreased to 15 hs and Humalog to 3 TID on 1/26  · Added 3 units at lunch in addition to ssi   · Blood sugars had improved but now increasing with improved oral intake  · Increase Lantus to 18 hs and Humalog to 5 TID  · Monitor blood sugars and adjust insulin accordingly

## 2022-01-30 NOTE — PHYSICAL THERAPY NOTE
Physical Therapy Cancellation Note  Spoke to CM in regards to patient, with CM stating that patient is continuing refusing physical therapy at this time  Will continue to follow up with patient as appropriate       Ashely Doe, SOLO

## 2022-01-30 NOTE — PROGRESS NOTES
Progress Note - Infectious Disease   Herman Simmonds Sr  78 y o  male MRN: [id number]  Unit/Bed#: [facility] 603-01 Encounter: [id number]      Impression:  1  Klebsiella pneumoniae urosepsis with MARANAD on CKD  Gram-positive cocci contaminant  2  COPD with Chronic respiratory failure with hypoxia  3  Essential hypertension  4  Chronic diastolic CHF   5  Type 2 DM  6  History of prostate carcinoma     Recommendations:   Patient is afebrile with WBC count 11,250 when last taken  1  One blood and urine culture showing Klebsiella pneumoniae that is cefazolin susceptible  The other blood culture showing a coagulase-negative Staphylococcus which is a contaminant  2  Continue cephalexin 500 mg q i d  [place] Organ x 3 more days  3  Repeat CT chest ordered by primary service for    Antibiotics:   1  Cephalexin 500 mg q i d p o , Day 4 of 7    Subjective:  Mild pain in both legs and back  Denies fevers, chills, or sweats  Denies nausea, vomiting, or diarrhea  Objective:  Vitals:  Temp:  [97 7 °F (36 5 °C)-98 6 °F (37 °C)] 97 9 °F (36 6 °C)  HR:  [65-79] 79  Resp:  [20-21] 21  BP: (102-134)/(50-64) 102/58  SpO2:  [95 %-100 %] 98 %  Temp (24hrs), Av 1 °F (36 7 °C), Min:97 7 °F (36 5 °C), Max:98 6 °F (37 °C)  Current: Temperature: 97 9 °F (36 6 °C)    Physical Exam:     General Appearance:  Alert, chronically ill-appearing nontoxic, no acute distress  Throat: Oropharynx moist without lesions  Lips, mucosa, and tongue normal   Neck: Supple, symmetrical, trachea midline, no adenopathy,  no tenderness/mass/nodules   Lungs:   Hyper resonant few diffuse rhonchi, decreased respiratory expansion    Heart:  Regular rate and rhythm, S1, S2 normal, no murmur, rub or gallop   Abdomen:   Soft, non-tender, markedly protuberant non-distended, positive bowel sounds  No masses, no organomegaly    No CVA tenderness   Extremities: Lower extremity stasis changes   Skin: As above           Invasive Devices  Report    Peripheral Intravenous Line            Peripheral IV 12/15/21 Dorsal (posterior); Right Forearm 45 days    Peripheral IV 01/26/22 Right Antecubital 3 days                Labs, Imaging, & Other studies:   All pertinent labs were personally reviewed  Results from last 7 days   Lab Units 01/28/22  0444 01/25/22  0928 01/24/22  0434 01/23/22  0646 01/23/22  0646   WBC Thousand/uL  --  11 25* 11 86*  --  17 37*   HEMOGLOBIN g/dL 9 8* 9 8* 9 9*   < > 10 4*   PLATELETS Thousands/uL  --  272 195  --  181    < > = values in this interval not displayed  Results from last 7 days   Lab Units 01/29/22  0452 01/28/22 0444 01/28/22 0444 01/27/22 0444 01/27/22 0444   SODIUM mmol/L 137  --  139  --  138   POTASSIUM mmol/L 4 8   < > 4 8   < > 4 3   CHLORIDE mmol/L 102   < > 102   < > 103   CO2 mmol/L 30   < > 33*   < > 31   BUN mg/dL 28*   < > 32*   < > 38*   CREATININE mg/dL 1 46*   < > 1 46*   < > 1 43*   EGFR ml/min/1 73sq m 46   < > 46   < > 47   CALCIUM mg/dL 9 3   < > 8 6   < > 8 8   AST U/L  --   --  11  --   --    ALT U/L  --   --  13  --   --    ALK PHOS U/L  --   --  151*  --   --     < > = values in this interval not displayed

## 2022-01-31 ENCOUNTER — PATIENT OUTREACH (OUTPATIENT)
Dept: CASE MANAGEMENT | Facility: OTHER | Age: 77
End: 2022-01-31

## 2022-01-31 ENCOUNTER — TELEPHONE (OUTPATIENT)
Dept: FAMILY MEDICINE CLINIC | Facility: CLINIC | Age: 77
End: 2022-01-31

## 2022-01-31 NOTE — PROGRESS NOTES
OPCM  SW received ADT  Alert regarding patient  Chart review completed  Patient was admitted to [facility] from 01/20-01/30 for sepsis  He refused rehab, but was open to [facility]  Discharged with [facility] US CM will attempt one more outreach & then close if no response

## 2022-02-01 ENCOUNTER — PATIENT OUTREACH (OUTPATIENT)
Dept: CASE MANAGEMENT | Facility: OTHER | Age: 77
End: 2022-02-01

## 2022-02-01 ENCOUNTER — TELEPHONE (OUTPATIENT)
Dept: NEPHROLOGY | Facility: CLINIC | Age: 77
End: 2022-02-01

## 2022-02-01 ENCOUNTER — TELEPHONE (OUTPATIENT)
Dept: FAMILY MEDICINE CLINIC | Facility: CLINIC | Age: 77
End: 2022-02-01

## 2022-02-01 NOTE — TELEPHONE ENCOUNTER
----- Message from Kevin Al sent at 1/31/2022  8:53 AM EST -----  Regarding: FW: CKD hospital f/u appt    ----- Message -----  From: Gerardo Fowler DO  Sent: 1/30/2022  12:18 PM EST  To: Nephrology [place] [facility]  Subject: CKD hospital f/u appt                            Patient has been discharged from [place]  Please schedule hospital follow up for CKD in 2-3 weeks with first available provider  Patient should have BMP prior to office visit which has already been ordered  Thanks

## 2022-02-01 NOTE — UTILIZATION REVIEW
Notification of Discharge   This is a Notification of Discharge from our facility [address]  Please be advised that this patient has been discharge from our facility  Below you will find the admission and discharge date and time including the patients disposition  UTILIZATION REVIEW CONTACT:  Nadira Hurtado  Utilization   Network Utilization Review Department  Phone: [phone] x carefully listen to the prompts  All voicemails are confidential   Email: [email or web address]     PHYSICIAN ADVISORY SERVICES:  FOR [id number] REVIEW - MEDICAL NECESSITY DENIAL  Phone: [phone]  Fax: [phone]  Email: [email or web address]     PRESENTATION DATE: 1/20/2022 11:50 PM  OBERVATION ADMISSION DATE:   INPATIENT ADMISSION DATE: 1/21/22  3:16 AM   DISCHARGE DATE: 1/30/2022  2:48 PM  DISPOSITION: Home with [place] with [address] [place] [address] Road INFORMATION:  Send all requests for admission clinical reviews, approved or denied determinations and any other requests to dedicated fax number below belonging to the campus where the patient is receiving treatment   List of dedicated fax numbers:  [address] [place] [address] DENIALS (Administrative/Medical Necessity) [phone]   [address] (Maternity/NICU/Pediatrics) [phone]   [facility] [place] [phone]   [address] [facility] [phone]   [address] [place] [address] [phone]   [address] [place] [address],[address]Th Floor [address] [phone]   [facility]  [phone]   [address] [place] [address], [facility]  2401 [facility] 1000 [facility] [phone]

## 2022-02-01 NOTE — PROGRESS NOTES
Outreach attempted  I received a recording "please try again later"  No answer and no voicemail is available

## 2022-02-02 ENCOUNTER — TELEPHONE (OUTPATIENT)
Dept: NEPHROLOGY | Facility: CLINIC | Age: 77
End: 2022-02-02

## 2022-02-02 NOTE — TELEPHONE ENCOUNTER
----- Message from Ramesh Guadeloupe sent at 1/31/2022  8:53 AM EST -----  Regarding: FW: CKD hospital f/u appt    ----- Message -----  From: Marquita Patel DO  Sent: 1/30/2022  12:18 PM EST  To: [facility] [place] [facility]  Subject: CKD hospital f/u appt                            Patient has been discharged from [facility]  Please schedule hospital follow up for CKD in 2-3 weeks with first available provider  Patient should have BMP prior to office visit which has already been ordered  Thanks

## 2022-02-02 NOTE — TELEPHONE ENCOUNTER
Scheduled patients hospital f/u with Dr Willian Zuleta 03/08  Patient is aware that he needs lab work  I mailed labs and a appointment reminder card to his house

## 2022-02-04 ENCOUNTER — PATIENT OUTREACH (OUTPATIENT)
Dept: CASE MANAGEMENT | Facility: OTHER | Age: 77
End: 2022-02-04

## 2022-02-07 ENCOUNTER — PATIENT OUTREACH (OUTPATIENT)
Dept: CASE MANAGEMENT | Facility: OTHER | Age: 77
End: 2022-02-07

## 2022-02-07 NOTE — LETTER
Date: 02/07/22    Dear Smita Palma ,   My name is Vera Cristina; I am a registered nurse care manager working with [address] [place] [address]  I have not been able to reach you and would like to set a time that I can talk with you over the phone  My work is to help patients that have complex medical conditions get the care they need  This includes patients who may have been in the hospital or emergency room  Please call me with any questions you may have     Sincerely,  Ilya Wilson RN  [phone]  Outpatient Care Manager

## 2022-02-15 ENCOUNTER — PATIENT OUTREACH (OUTPATIENT)
Dept: CASE MANAGEMENT | Facility: OTHER | Age: 77
End: 2022-02-15

## 2022-02-15 NOTE — PROGRESS NOTES
No response from patient despite attempts by outreach and unable to reach letter  Patient is scheduled to see PCP on 2/21  I removed myself from the care team and notified the clinical office staff  I updated the care coordination note

## 2022-02-15 NOTE — PROGRESS NOTES
OP CM SW completed chart review  Unable to reach by SW CM and RN CM  Will be closing at this time  SW CM will be closing at this time

## 2022-02-21 ENCOUNTER — APPOINTMENT (EMERGENCY)
Dept: RADIOLOGY | Facility: HOSPITAL | Age: 77
DRG: 191 | End: 2022-02-21
Payer: COMMERCIAL

## 2022-02-21 ENCOUNTER — HOSPITAL ENCOUNTER (INPATIENT)
Facility: HOSPITAL | Age: 77
LOS: 2 days | Discharge: HOME | DRG: 191 | End: 2022-02-23
Attending: EMERGENCY MEDICINE | Admitting: INTERNAL MEDICINE
Payer: COMMERCIAL

## 2022-02-21 ENCOUNTER — APPOINTMENT (INPATIENT)
Dept: RADIOLOGY | Facility: HOSPITAL | Age: 77
DRG: 191 | End: 2022-02-21
Payer: COMMERCIAL

## 2022-02-21 DIAGNOSIS — C34.91 ADENOCARCINOMA OF RIGHT LUNG (HCC): ICD-10-CM

## 2022-02-21 DIAGNOSIS — Z86.79 HISTORY OF CHF (CONGESTIVE HEART FAILURE): ICD-10-CM

## 2022-02-21 DIAGNOSIS — J96.11 CHRONIC RESPIRATORY FAILURE WITH HYPOXIA (HCC): ICD-10-CM

## 2022-02-21 DIAGNOSIS — Z79.4 TYPE 2 DIABETES MELLITUS WITH HYPERGLYCEMIA, WITH LONG-TERM CURRENT USE OF INSULIN (HCC): Chronic | ICD-10-CM

## 2022-02-21 DIAGNOSIS — G89.29 CHRONIC BILATERAL LOW BACK PAIN, UNSPECIFIED WHETHER SCIATICA PRESENT: ICD-10-CM

## 2022-02-21 DIAGNOSIS — M54.50 CHRONIC BILATERAL LOW BACK PAIN, UNSPECIFIED WHETHER SCIATICA PRESENT: ICD-10-CM

## 2022-02-21 DIAGNOSIS — E11.9 DIABETES MELLITUS WITHOUT COMPLICATION (HCC): ICD-10-CM

## 2022-02-21 DIAGNOSIS — J44.1 COPD EXACERBATION (HCC): ICD-10-CM

## 2022-02-21 DIAGNOSIS — R07.89 LEFT-SIDED CHEST WALL PAIN: ICD-10-CM

## 2022-02-21 DIAGNOSIS — J44.1 COPD WITH ACUTE EXACERBATION (HCC): ICD-10-CM

## 2022-02-21 DIAGNOSIS — R07.9 CHEST PAIN: Primary | ICD-10-CM

## 2022-02-21 DIAGNOSIS — R06.00 DYSPNEA: ICD-10-CM

## 2022-02-21 DIAGNOSIS — E11.65 TYPE 2 DIABETES MELLITUS WITH HYPERGLYCEMIA, WITH LONG-TERM CURRENT USE OF INSULIN (HCC): Chronic | ICD-10-CM

## 2022-02-21 DIAGNOSIS — N18.31 STAGE 3A CHRONIC KIDNEY DISEASE (HCC): ICD-10-CM

## 2022-02-21 LAB
2HR DELTA HS TROPONIN: -1 NG/L
4HR DELTA HS TROPONIN: -1 NG/L
ALBUMIN SERPL BCP-MCNC: 2.9 G/DL (ref 3.5–5)
ALP SERPL-CCNC: 108 U/L (ref 46–116)
ALT SERPL W P-5'-P-CCNC: 14 U/L (ref 12–78)
ANION GAP SERPL CALCULATED.3IONS-SCNC: 5 MMOL/L (ref 4–13)
AST SERPL W P-5'-P-CCNC: 5 U/L (ref 5–45)
ATRIAL RATE: 113 BPM
BASOPHILS # BLD AUTO: 0.03 THOUSANDS/ΜL (ref 0–0.1)
BASOPHILS NFR BLD AUTO: 0 % (ref 0–1)
BILIRUB SERPL-MCNC: 0.26 MG/DL (ref 0.2–1)
BUN SERPL-MCNC: 26 MG/DL (ref 5–25)
CALCIUM ALBUM COR SERPL-MCNC: 9.9 MG/DL (ref 8.3–10.1)
CALCIUM SERPL-MCNC: 9 MG/DL (ref 8.3–10.1)
CARDIAC TROPONIN I PNL SERPL HS: 15 NG/L
CARDIAC TROPONIN I PNL SERPL HS: 15 NG/L
CARDIAC TROPONIN I PNL SERPL HS: 16 NG/L
CHLORIDE SERPL-SCNC: 100 MMOL/L (ref 100–108)
CO2 SERPL-SCNC: 27 MMOL/L (ref 21–32)
CREAT SERPL-MCNC: 1.64 MG/DL (ref 0.6–1.3)
EOSINOPHIL # BLD AUTO: 0.04 THOUSAND/ΜL (ref 0–0.61)
EOSINOPHIL NFR BLD AUTO: 0 % (ref 0–6)
ERYTHROCYTE [DISTWIDTH] IN BLOOD BY AUTOMATED COUNT: 15.4 % (ref 11.6–15.1)
EST. AVERAGE GLUCOSE BLD GHB EST-MCNC: 206 MG/DL
FLUAV RNA RESP QL NAA+PROBE: NEGATIVE
FLUBV RNA RESP QL NAA+PROBE: NEGATIVE
GFR SERPL CREATININE-BSD FRML MDRD: 40 ML/MIN/1.73SQ M
GLUCOSE SERPL-MCNC: 354 MG/DL (ref 65–140)
GLUCOSE SERPL-MCNC: 373 MG/DL (ref 65–140)
GLUCOSE SERPL-MCNC: 397 MG/DL (ref 65–140)
GLUCOSE SERPL-MCNC: 416 MG/DL (ref 65–140)
GLUCOSE SERPL-MCNC: 451 MG/DL (ref 65–140)
HBA1C MFR BLD: 8.8 %
HCT VFR BLD AUTO: 33.4 % (ref 36.5–49.3)
HGB BLD-MCNC: 10.3 G/DL (ref 12–17)
IMM GRANULOCYTES # BLD AUTO: 0.07 THOUSAND/UL (ref 0–0.2)
IMM GRANULOCYTES NFR BLD AUTO: 1 % (ref 0–2)
LYMPHOCYTES # BLD AUTO: 1.08 THOUSANDS/ΜL (ref 0.6–4.47)
LYMPHOCYTES NFR BLD AUTO: 7 % (ref 14–44)
MCH RBC QN AUTO: 25.5 PG (ref 26.8–34.3)
MCHC RBC AUTO-ENTMCNC: 30.8 G/DL (ref 31.4–37.4)
MCV RBC AUTO: 83 FL (ref 82–98)
MONOCYTES # BLD AUTO: 1.31 THOUSAND/ΜL (ref 0.17–1.22)
MONOCYTES NFR BLD AUTO: 9 % (ref 4–12)
NEUTROPHILS # BLD AUTO: 12.23 THOUSANDS/ΜL (ref 1.85–7.62)
NEUTS SEG NFR BLD AUTO: 83 % (ref 43–75)
NRBC BLD AUTO-RTO: 0 /100 WBCS
NT-PROBNP SERPL-MCNC: 1960 PG/ML
P AXIS: 58 DEGREES
PLATELET # BLD AUTO: 220 THOUSANDS/UL (ref 149–390)
PMV BLD AUTO: 10.8 FL (ref 8.9–12.7)
POTASSIUM SERPL-SCNC: 4.6 MMOL/L (ref 3.5–5.3)
PR INTERVAL: 232 MS
PROCALCITONIN SERPL-MCNC: 0.26 NG/ML
PROCALCITONIN SERPL-MCNC: 0.64 NG/ML
PROT SERPL-MCNC: 8.4 G/DL (ref 6.4–8.2)
QRS AXIS: 74 DEGREES
QRSD INTERVAL: 148 MS
QT INTERVAL: 356 MS
QTC INTERVAL: 481 MS
RBC # BLD AUTO: 4.04 MILLION/UL (ref 3.88–5.62)
RSV RNA RESP QL NAA+PROBE: NEGATIVE
SARS-COV-2 RNA RESP QL NAA+PROBE: NEGATIVE
SODIUM SERPL-SCNC: 132 MMOL/L (ref 136–145)
T WAVE AXIS: 27 DEGREES
VENTRICULAR RATE: 110 BPM
WBC # BLD AUTO: 14.76 THOUSAND/UL (ref 4.31–10.16)

## 2022-02-21 PROCEDURE — 82948 REAGENT STRIP/BLOOD GLUCOSE: CPT

## 2022-02-21 PROCEDURE — 99285 EMERGENCY DEPT VISIT HI MDM: CPT | Performed by: EMERGENCY MEDICINE

## 2022-02-21 PROCEDURE — 83036 HEMOGLOBIN GLYCOSYLATED A1C: CPT | Performed by: INTERNAL MEDICINE

## 2022-02-21 PROCEDURE — 94640 AIRWAY INHALATION TREATMENT: CPT

## 2022-02-21 PROCEDURE — 84484 ASSAY OF TROPONIN QUANT: CPT | Performed by: EMERGENCY MEDICINE

## 2022-02-21 PROCEDURE — 80053 COMPREHEN METABOLIC PANEL: CPT | Performed by: EMERGENCY MEDICINE

## 2022-02-21 PROCEDURE — 84145 PROCALCITONIN (PCT): CPT | Performed by: INTERNAL MEDICINE

## 2022-02-21 PROCEDURE — 99285 EMERGENCY DEPT VISIT HI MDM: CPT

## 2022-02-21 PROCEDURE — 93005 ELECTROCARDIOGRAM TRACING: CPT

## 2022-02-21 PROCEDURE — 85025 COMPLETE CBC W/AUTO DIFF WBC: CPT | Performed by: EMERGENCY MEDICINE

## 2022-02-21 PROCEDURE — 71275 CT ANGIOGRAPHY CHEST: CPT

## 2022-02-21 PROCEDURE — 99223 1ST HOSP IP/OBS HIGH 75: CPT | Performed by: INTERNAL MEDICINE

## 2022-02-21 PROCEDURE — G1004 CDSM NDSC: HCPCS

## 2022-02-21 PROCEDURE — 93010 ELECTROCARDIOGRAM REPORT: CPT | Performed by: INTERNAL MEDICINE

## 2022-02-21 PROCEDURE — 94760 N-INVAS EAR/PLS OXIMETRY 1: CPT

## 2022-02-21 PROCEDURE — 71045 X-RAY EXAM CHEST 1 VIEW: CPT

## 2022-02-21 PROCEDURE — 83880 ASSAY OF NATRIURETIC PEPTIDE: CPT | Performed by: INTERNAL MEDICINE

## 2022-02-21 PROCEDURE — 36415 COLL VENOUS BLD VENIPUNCTURE: CPT | Performed by: EMERGENCY MEDICINE

## 2022-02-21 PROCEDURE — 96374 THER/PROPH/DIAG INJ IV PUSH: CPT

## 2022-02-21 PROCEDURE — 94664 DEMO&/EVAL PT USE INHALER: CPT

## 2022-02-21 PROCEDURE — 0241U HB NFCT DS VIR RESP RNA 4 TRGT: CPT | Performed by: EMERGENCY MEDICINE

## 2022-02-21 RX ORDER — PREDNISONE 20 MG/1
40 TABLET ORAL DAILY
Status: COMPLETED | OUTPATIENT
Start: 2022-02-21 | End: 2022-02-23

## 2022-02-21 RX ORDER — FLUTICASONE FUROATE AND VILANTEROL 100; 25 UG/1; UG/1
1 POWDER RESPIRATORY (INHALATION) DAILY
Status: DISCONTINUED | OUTPATIENT
Start: 2022-02-21 | End: 2022-02-23 | Stop reason: HOSPADM

## 2022-02-21 RX ORDER — MORPHINE SULFATE 10 MG/ML
6 INJECTION, SOLUTION INTRAMUSCULAR; INTRAVENOUS ONCE
Status: COMPLETED | OUTPATIENT
Start: 2022-02-21 | End: 2022-02-21

## 2022-02-21 RX ORDER — LIDOCAINE 50 MG/G
1 PATCH TOPICAL DAILY
Status: DISCONTINUED | OUTPATIENT
Start: 2022-02-21 | End: 2022-02-23 | Stop reason: HOSPADM

## 2022-02-21 RX ORDER — MAGNESIUM HYDROXIDE/ALUMINUM HYDROXICE/SIMETHICONE 120; 1200; 1200 MG/30ML; MG/30ML; MG/30ML
30 SUSPENSION ORAL EVERY 6 HOURS PRN
Status: DISCONTINUED | OUTPATIENT
Start: 2022-02-21 | End: 2022-02-23 | Stop reason: HOSPADM

## 2022-02-21 RX ORDER — LEVALBUTEROL 1.25 MG/.5ML
1.25 SOLUTION, CONCENTRATE RESPIRATORY (INHALATION) EVERY 6 HOURS PRN
Status: DISCONTINUED | OUTPATIENT
Start: 2022-02-21 | End: 2022-02-21

## 2022-02-21 RX ORDER — ASPIRIN 81 MG/1
324 TABLET, CHEWABLE ORAL ONCE
Status: COMPLETED | OUTPATIENT
Start: 2022-02-21 | End: 2022-02-21

## 2022-02-21 RX ORDER — POTASSIUM CHLORIDE 20 MEQ/1
20 TABLET, EXTENDED RELEASE ORAL DAILY
Status: DISCONTINUED | OUTPATIENT
Start: 2022-02-21 | End: 2022-02-23 | Stop reason: HOSPADM

## 2022-02-21 RX ORDER — FERROUS SULFATE 325(65) MG
325 TABLET ORAL
Status: DISCONTINUED | OUTPATIENT
Start: 2022-02-21 | End: 2022-02-23 | Stop reason: HOSPADM

## 2022-02-21 RX ORDER — IPRATROPIUM BROMIDE AND ALBUTEROL SULFATE 2.5; .5 MG/3ML; MG/3ML
3 SOLUTION RESPIRATORY (INHALATION) EVERY 6 HOURS PRN
Status: DISCONTINUED | OUTPATIENT
Start: 2022-02-21 | End: 2022-02-23 | Stop reason: HOSPADM

## 2022-02-21 RX ORDER — AMOXICILLIN AND CLAVULANATE POTASSIUM 875; 125 MG/1; MG/1
1 TABLET, FILM COATED ORAL EVERY 12 HOURS SCHEDULED
Status: DISCONTINUED | OUTPATIENT
Start: 2022-02-21 | End: 2022-02-23 | Stop reason: HOSPADM

## 2022-02-21 RX ORDER — PREDNISONE 20 MG/1
40 TABLET ORAL DAILY
Status: DISCONTINUED | OUTPATIENT
Start: 2022-02-21 | End: 2022-02-21

## 2022-02-21 RX ORDER — DOCUSATE SODIUM 100 MG/1
100 CAPSULE, LIQUID FILLED ORAL 2 TIMES DAILY PRN
Status: DISCONTINUED | OUTPATIENT
Start: 2022-02-21 | End: 2022-02-23 | Stop reason: HOSPADM

## 2022-02-21 RX ORDER — SODIUM CHLORIDE FOR INHALATION 0.9 %
3 VIAL, NEBULIZER (ML) INHALATION ONCE
Status: COMPLETED | OUTPATIENT
Start: 2022-02-21 | End: 2022-02-21

## 2022-02-21 RX ORDER — OXYCODONE HYDROCHLORIDE 5 MG/1
5 TABLET ORAL EVERY 4 HOURS PRN
Status: DISCONTINUED | OUTPATIENT
Start: 2022-02-21 | End: 2022-02-23 | Stop reason: HOSPADM

## 2022-02-21 RX ORDER — OXYCODONE HYDROCHLORIDE 5 MG/1
2.5 TABLET ORAL EVERY 4 HOURS PRN
Status: DISCONTINUED | OUTPATIENT
Start: 2022-02-21 | End: 2022-02-23 | Stop reason: HOSPADM

## 2022-02-21 RX ORDER — ACETAMINOPHEN 325 MG/1
650 TABLET ORAL EVERY 6 HOURS PRN
Status: DISCONTINUED | OUTPATIENT
Start: 2022-02-21 | End: 2022-02-23 | Stop reason: HOSPADM

## 2022-02-21 RX ORDER — SODIUM CHLORIDE 9 MG/ML
3 INJECTION INTRAVENOUS
Status: DISCONTINUED | OUTPATIENT
Start: 2022-02-21 | End: 2022-02-23 | Stop reason: HOSPADM

## 2022-02-21 RX ORDER — PRAVASTATIN SODIUM 20 MG
80 TABLET ORAL
Status: DISCONTINUED | OUTPATIENT
Start: 2022-02-21 | End: 2022-02-23 | Stop reason: HOSPADM

## 2022-02-21 RX ORDER — SODIUM CHLORIDE FOR INHALATION 0.9 %
3 VIAL, NEBULIZER (ML) INHALATION EVERY 6 HOURS PRN
Status: DISCONTINUED | OUTPATIENT
Start: 2022-02-21 | End: 2022-02-21

## 2022-02-21 RX ORDER — GUAIFENESIN 600 MG
600 TABLET, EXTENDED RELEASE 12 HR ORAL 2 TIMES DAILY
Status: DISCONTINUED | OUTPATIENT
Start: 2022-02-21 | End: 2022-02-23 | Stop reason: HOSPADM

## 2022-02-21 RX ORDER — CYCLOBENZAPRINE HCL 10 MG
10 TABLET ORAL 3 TIMES DAILY
Status: DISCONTINUED | OUTPATIENT
Start: 2022-02-21 | End: 2022-02-23 | Stop reason: HOSPADM

## 2022-02-21 RX ORDER — FUROSEMIDE 40 MG/1
40 TABLET ORAL DAILY
Status: DISCONTINUED | OUTPATIENT
Start: 2022-02-21 | End: 2022-02-23 | Stop reason: HOSPADM

## 2022-02-21 RX ORDER — ONDANSETRON 2 MG/ML
4 INJECTION INTRAMUSCULAR; INTRAVENOUS EVERY 6 HOURS PRN
Status: DISCONTINUED | OUTPATIENT
Start: 2022-02-21 | End: 2022-02-23 | Stop reason: HOSPADM

## 2022-02-21 RX ORDER — HYDROMORPHONE HCL/PF 1 MG/ML
0.5 SYRINGE (ML) INJECTION EVERY 4 HOURS PRN
Status: DISCONTINUED | OUTPATIENT
Start: 2022-02-21 | End: 2022-02-23 | Stop reason: HOSPADM

## 2022-02-21 RX ADMIN — OXYCODONE HYDROCHLORIDE 5 MG: 5 TABLET ORAL at 11:37

## 2022-02-21 RX ADMIN — ISODIUM CHLORIDE 3 ML: 0.03 SOLUTION RESPIRATORY (INHALATION) at 03:19

## 2022-02-21 RX ADMIN — TIOTROPIUM BROMIDE 18 MCG: 18 CAPSULE ORAL; RESPIRATORY (INHALATION) at 13:12

## 2022-02-21 RX ADMIN — METOPROLOL TARTRATE 25 MG: 25 TABLET, FILM COATED ORAL at 08:39

## 2022-02-21 RX ADMIN — POTASSIUM CHLORIDE 20 MEQ: 1500 TABLET, EXTENDED RELEASE ORAL at 08:39

## 2022-02-21 RX ADMIN — INSULIN LISPRO 3 UNITS: 100 INJECTION, SOLUTION INTRAVENOUS; SUBCUTANEOUS at 21:58

## 2022-02-21 RX ADMIN — INSULIN LISPRO 6 UNITS: 100 INJECTION, SOLUTION INTRAVENOUS; SUBCUTANEOUS at 08:35

## 2022-02-21 RX ADMIN — PREDNISONE 40 MG: 20 TABLET ORAL at 04:54

## 2022-02-21 RX ADMIN — FERROUS SULFATE TAB 325 MG (65 MG ELEMENTAL FE) 325 MG: 325 (65 FE) TAB at 07:46

## 2022-02-21 RX ADMIN — GUAIFENESIN 600 MG: 600 TABLET, EXTENDED RELEASE ORAL at 08:40

## 2022-02-21 RX ADMIN — CYCLOBENZAPRINE HYDROCHLORIDE 10 MG: 10 TABLET, FILM COATED ORAL at 16:22

## 2022-02-21 RX ADMIN — AMOXICILLIN AND CLAVULANATE POTASSIUM 1 TABLET: 875; 125 TABLET, FILM COATED ORAL at 11:36

## 2022-02-21 RX ADMIN — INSULIN LISPRO 6 UNITS: 100 INJECTION, SOLUTION INTRAVENOUS; SUBCUTANEOUS at 18:39

## 2022-02-21 RX ADMIN — ENOXAPARIN SODIUM 40 MG: 40 INJECTION SUBCUTANEOUS at 08:40

## 2022-02-21 RX ADMIN — FUROSEMIDE 40 MG: 40 TABLET ORAL at 08:39

## 2022-02-21 RX ADMIN — IPRATROPIUM BROMIDE 1 MG: 0.5 SOLUTION RESPIRATORY (INHALATION) at 03:19

## 2022-02-21 RX ADMIN — METOPROLOL TARTRATE 25 MG: 25 TABLET, FILM COATED ORAL at 18:39

## 2022-02-21 RX ADMIN — PREDNISONE 40 MG: 20 TABLET ORAL at 13:12

## 2022-02-21 RX ADMIN — INSULIN HUMAN 45 UNITS: 500 INJECTION, SOLUTION SUBCUTANEOUS at 08:38

## 2022-02-21 RX ADMIN — ALBUTEROL SULFATE 10 MG: 2.5 SOLUTION RESPIRATORY (INHALATION) at 03:19

## 2022-02-21 RX ADMIN — CYCLOBENZAPRINE HYDROCHLORIDE 10 MG: 10 TABLET, FILM COATED ORAL at 21:57

## 2022-02-21 RX ADMIN — ASPIRIN 81 MG CHEWABLE TABLET 324 MG: 81 TABLET CHEWABLE at 03:18

## 2022-02-21 RX ADMIN — PRAVASTATIN SODIUM 80 MG: 20 TABLET ORAL at 16:22

## 2022-02-21 RX ADMIN — CYCLOBENZAPRINE HYDROCHLORIDE 10 MG: 10 TABLET, FILM COATED ORAL at 08:40

## 2022-02-21 RX ADMIN — IPRATROPIUM BROMIDE 0.5 MG: 0.5 SOLUTION RESPIRATORY (INHALATION) at 09:30

## 2022-02-21 RX ADMIN — GUAIFENESIN 600 MG: 600 TABLET, EXTENDED RELEASE ORAL at 18:39

## 2022-02-21 RX ADMIN — INSULIN LISPRO 6 UNITS: 100 INJECTION, SOLUTION INTRAVENOUS; SUBCUTANEOUS at 12:24

## 2022-02-21 RX ADMIN — OXYCODONE HYDROCHLORIDE 5 MG: 5 TABLET ORAL at 22:01

## 2022-02-21 RX ADMIN — INSULIN HUMAN 45 UNITS: 500 INJECTION, SOLUTION SUBCUTANEOUS at 18:38

## 2022-02-21 RX ADMIN — MORPHINE SULFATE 6 MG: 10 INJECTION INTRAVENOUS at 04:04

## 2022-02-21 RX ADMIN — AMOXICILLIN AND CLAVULANATE POTASSIUM 1 TABLET: 875; 125 TABLET, FILM COATED ORAL at 21:57

## 2022-02-21 RX ADMIN — IOHEXOL 85 ML: 350 INJECTION, SOLUTION INTRAVENOUS at 05:23

## 2022-02-21 NOTE — ASSESSMENT & PLAN NOTE
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  · Completed SBRT to RUL on 12/31/20  · Last seen by radiation oncology on 9/14/21  · He had CTA study on July 23, 2021 - "Stable right lung volume loss secondary to areas of rounded atelectasis at the right middle and lower lobes  Small/moderate size right pleural effusion "  · CXR this admission - "Stable right lung volume loss with known right lung base round atelectasis and loculated pleural effusion "  · Recommended to have repeat CT chest January 2022 and f/u as outpatient- this was done and did not show any acute findings   · Continue to recommend outpatient f/u    Hence will get CT scan of the chest tonight  Pulmonary consult

## 2022-02-21 NOTE — ASSESSMENT & PLAN NOTE
Patient is noted to have worse hypoxia with current use of 3 L O2 (is on home O2 3 L) noted saturation from usual 96 to 97 currently at 92  Will start patient on prednisone 40 mg daily  Continue Xopenex inhalation as needed  On Brio Ellipta 100/251 puff daily  Also continue Atrovent t i d  Pulmonary opinion

## 2022-02-21 NOTE — RESPIRATORY THERAPY NOTE
RT Protocol Note  Shira Manuel  78 y o  male MRN: [id number]  Unit/Bed#: ED 17 Encounter: [id number]    Assessment    Principal Problem:    Left-sided chest wall pain  Active Problems:    HLD (hyperlipidemia)    Type 2 diabetes mellitus with hyperglycemia, with long-term current use of insulin (HCC)    Pleural effusion on right    Essential hypertension    Oxygen dependent    COPD with acute exacerbation (HCC)    Adenocarcinoma of lung (HCC)    CKD (chronic kidney disease) stage 3, GFR 30-59 ml/min ([facility])      Home Pulmonary Medications:  3LNC  Home Devices/Therapy: (P) Home O2    Past Medical History:   Diagnosis Date    Abdominal pain     [facility]DA [facility]) ([facility] ) 2018    Cardiac disease     CHF (congestive heart failure) ([facility])     COPD, severe (HCC)     Coronary artery disease     DDD (degenerative disc disease), lumbar 2020    Diabetes mellitus ([facility] )     Dyspnea     GERD (gastroesophageal reflux disease)     Hyperlipidemia     Hypertension     MI (myocardial infarction) ([place] [address] )     with 3 stents    Nodule of apex of right lung     JACQUELINE (obstructive sleep apnea)     Prostate cancer ([place] [address] )      Social History     Socioeconomic History    Marital status: /Civil Union     Spouse name: None    Number of children: 1    Years of education: 8    Highest education level: None   Occupational History    None   Tobacco Use    Smoking status: Former Smoker     Packs/day: 1 50     Years: 50 00     Pack years: 75 00     Start date: 38     Quit date: 2020     Years since quittin 6    Smokeless tobacco: Never Used   Vaping Use    Vaping Use: Never used   Substance and Sexual Activity    Alcohol use: Not Currently    Drug use: No    Sexual activity: Never     Partners: Female   Other Topics Concern    None   Social History Narrative    Most recent tobacco use screenin2018    Do you currently or have you served in [employer]: No    Were you activated, into active duty, as a member of the [employer] or as a Reservist: No    Caffeine intake: Moderate    Alcohol intake: None    Marital status:     Number of children: 1    Education: 8    Occupation: retired    Sexual orientation: Heterosexual    General stress level: Medium    Live alone or with others: with others    Exercise level: None    Sexually active: No    Overweight: Yes    Obese: Yes    Guns present in home: No    Seat belts used routinely: Yes    Advance directive: No    Sunscreen used routinely: No    Smoke alarm in home: Yes    Legally blind in one or both eyes: No    Hard of hearing or deaf in one or both ears: No     Social Determinants of Health     Financial Resource Strain: Not on file   Food Insecurity: No Food Insecurity    Worried About Running Out of Food in the Last Year: Never true    Kenyatta of Food in the Last Year: Never true   Transportation Needs: No Transportation Needs    Lack of Transportation (Medical): No    Lack of Transportation (Non-Medical): No   Physical Activity: Not on file   Stress: Not on file   Social Connections: Not on file   Intimate Partner Violence: Not on file   Housing Stability: Unknown    Unable to Pay for Housing in the Last Year: No    Number of Places Lived in the Last Year: Not on file    Unstable Housing in the Last Year: No       Subjective         Objective    Physical Exam:   Assessment Type: (P) Assess only  General Appearance: (P) Awake,Alert  Respiratory Pattern: (P) Normal  Chest Assessment: (P) Chest expansion symmetrical  Bilateral Breath Sounds: (P) Diminished    Vitals:  Blood pressure 141/64, pulse 70, temperature 98 1 °F (36 7 °C), temperature source Oral, resp  rate 20, weight 107 kg (236 lb), SpO2 100 %  Imaging and other studies: I have personally reviewed pertinent reports              Plan    Respiratory Plan: (P) Mild Distress pathway  Airway Clearance Plan: (P) Discontinue Protocol     Resp Comments: (P) RT protocol assessment done  Pt is on 3lnc 29%  Pt has hx of copd w/ likely exacerbation  Will continue atrovent tid w/ xop prn

## 2022-02-21 NOTE — ASSESSMENT & PLAN NOTE
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  · Completed SBRT to RUL on 12/31/20  · Last seen by radiation oncology on 9/14/21  · He had CTA study on July 23, 2021 - "Stable right lung volume loss secondary to areas of rounded atelectasis at the right middle and lower lobes  Small/moderate size right pleural effusion "  · CXR this admission - "Stable right lung volume loss with known right lung base round atelectasis and loculated pleural effusion "  · Recommended to have repeat CT chest January 2022 and f/u as outpatient will order one on Monday since pt remains in the hospital   · Continue to recommend outpatient f/u    Hence will get CT scan of the chest tonight  Pulmonary consult

## 2022-02-21 NOTE — H&P
[address] [facility]  H&P- Elana Osborne   1945, 78 y o  male MRN: [id number]  Unit/Bed#: ED 17 Encounter: [id number]  Primary Care Provider: Nina Mata MD   Date and time admitted to hospital: 2/21/2022  2:45 AM    * Left-sided chest wall pain  Assessment & Plan  Patient came in with new onset left chest wall pain that is almost 24 hours old and is worsening throughout the night he cannot sleep  Noted the patient also has worsening hypoxia from 3 L O2 with saturation of 96 to 97% currently he is 92  Could be pleurisy in relation to COPD exacerbation  Patient is also due for CT scan of the chest for follow-up adeno carcinoma of the right lung (see note of January 30, 2022 discharge summary); patient has not had such study done  Will order current admission  Emergency room ordered for CTA chest PE protocol  Pulmonary consult  Pain control:  Geriatric pain protocol  Will also continue with oxygen supplementation:  3 L nasal cannula O2  Pleural effusion on right  Assessment & Plan  According to previous reports this has been stable; will follow-up with CT scan of chest     Adenocarcinoma of lung [facility])  Assessment & Plan  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  · Completed SBRT to RUL on 12/31/20  · Last seen by radiation oncology on 9/14/21  · He had CTA study on July 23, 2021 - "Stable right lung volume loss secondary to areas of rounded atelectasis at the right middle and lower lobes   Small/moderate size right pleural effusion "  · CXR this admission - "Stable right lung volume loss with known right lung base round atelectasis and loculated pleural effusion "  · Recommended to have repeat CT chest January 2022 and f/u as outpatient will order one on Monday since pt remains in the hospital   · Continue to recommend outpatient f/u    Hence will get CT scan of the chest tonight  Pulmonary consult  CKD (chronic kidney disease) stage 3, GFR 30-59 ml/min [facility])  Assessment & Plan  Lab Results   Component Value Date    EGFR 40 02/21/2022    EGFR 50 01/30/2022    EGFR 46 01/29/2022    CREATININE 1 64 (H) 02/21/2022    CREATININE 1 37 (H) 01/30/2022    CREATININE 1 46 (H) 01/29/2022   Current creatinine is 1 64 with GFR 40; will continue follow up creatinine levels  COPD with acute exacerbation [facility])  Assessment & Plan  Patient is noted to have worse hypoxia with current use of 3 L O2 (is on home O2 3 L) noted saturation from usual 96 to 97 currently at 92  Will start patient on prednisone 40 mg daily  Continue Xopenex inhalation as needed  On Brio Ellipta 100/251 puff daily  Also continue Atrovent t i d  Pulmonary opinion  Oxygen dependent  Assessment & Plan  Continue 3 L O2; might need increase if worsening hypoxia  Essential hypertension  Assessment & Plan  Metoprolol 25 mg twice daily to continue  On Lasix 40 mg daily  Type 2 diabetes mellitus with hyperglycemia, with long-term current use of insulin [facility])  Assessment & Plan  Lab Results   Component Value Date    HGBA1C 9 2 (H) 04/14/2021     On insulin regular  45 units twice daily with insulin sliding scale  Blood Sugar Average: Last 72 hrs:      HLD (hyperlipidemia)  Assessment & Plan  On Pravachol 80 mg  VTE Prophylaxis: Enoxaparin (Lovenox)  / sequential compression device   Code Status: Level 1 - Full Code as discussed with patient  POLST: There is no POLST form on file for this patient (pre-hospital)    Anticipated Length of Stay:  Patient will be admitted on an Inpatient basis with an anticipated length of stay of  greater than 2 midnights  Justification for Hospital Stay: Please see detailed plans noted above      Chief Complaint:     Left-sided pleuritic chest pain  History of Present Illness:  Liane Pierce  is a 78 y o  male who has past medical history significant for severe COPD on 3 L of O2 by nasal cannula at home; also has a past history of lung adeno carcinoma on the right with status post SBRT due to have repeat CT of the chest January 2022  He follows with Dr Chema Jimenez of pulmonary for this problem  In addition, he has chronic loculated pleural effusion on the right side where the adeno carcinoma is also located  He also has chronic kidney disease, essential hypertension, diabetes mellitus type 2 with long term use of insulin, hyperlipidemia obstructive sleep apnea, diabetic neuropathy, history of prostate cancer  He also has history of diastolic heart failure  According to patient, he has been in stable health until yesterday morning when he woke up with left-sided pleuritic chest pain located at the inferior anterior rib area and contiguous to the anterior left sternal area  It is worse with breathing  He denies any fever nor he denies any increased shortness of breath  He denies any sputum production or cough  Patient did not seek attention for this concern until early this morning because he could not sleep due to the pain  Here in the emergency room, he was noted to be hypoxic at 93 % saturation; in previous notes, he was at 97 %  He also has coarse breath sounds  Patient denies any sputum production  No nausea or vomiting  No abdominal pain  Chest x-ray that was taken tonight shows the presence of pleural effusion at the right somewhat similar to previous film  He denies any sick contacts  He claims that he lives alone  Currently, patient is lying on bed and is finishing a nebulization treatment  While he can speak in long sentences, is tachycardic at 111  He is also complaining of left-sided chest discomfort as noted      Review of Systems:    Constitutional:  Denies fever or chills   Eyes:  Denies change in visual acuity   HENT:  Denies nasal congestion or sore throat   Respiratory:  Denies cough or shortness of breath   Cardiovascular: Left-sided sharp chest discomfort without edema   GI:  Denies abdominal pain, nausea, vomiting, bloody stools or diarrhea   :  Denies dysuria denies any costovertebral angle tenderness  Musculoskeletal:  Denies back pain or joint pain   Integument:  Denies rash   Neurologic:  Denies headache, focal weakness or sensory changes   Endocrine:  Denies polyuria or polydipsia   Lymphatic:  Denies swollen glands   Psychiatric:  Denies depression or anxiety     Past Medical and Surgical History:   Past Medical History:   Diagnosis Date    Abdominal pain     MARANDA (acute kidney injury) ([facility] ) 6/19/2018    Cardiac disease     [facility]est[facility]) [facility]     COPD, severe (HCC)     Coronary artery disease     DDD (degenerative disc disease), lumbar 9/1/2020    Diabetes mellitus ([place] [address] )     Dyspnea     GERD (gastroesophageal reflux disease)     Hyperlipidemia     Hypertension     MI (myocardial infarction) ([place] [address] )     with 3 stents    Nodule of apex of right lung     JACQUELINE (obstructive sleep apnea)     Prostate cancer [facility])      Past Surgical History:   Procedure Laterality Date    ABDOMINAL SURGERY      exploratory    ANGIOPLASTY      3 stents    APPENDECTOMY      COLONOSCOPY  2015    CT NEEDLE BIOPSY LUNG  11/3/2020    ESOPHAGOGASTRODUODENOSCOPY N/A 10/2/2017    Procedure: ESOPHAGOGASTRODUODENOSCOPY (EGD); Surgeon: Mraleny Holcomb MD;  [facility]; Service: Gastroenterology    IR THORACENTESIS  11/3/2020    KNEE CARTILAGE SURGERY      OTHER SURGICAL HISTORY      stent placement    PROSTATE SURGERY      SKIN GRAFT      Basal cell CA back       Meds/Allergies:  (Not in a hospital admission)      Allergies:    Allergies   Allergen Reactions    Crestor [Rosuvastatin] Other (See Comments)     Unknown      Metformin GI Intolerance     History:  Marital Status: /Civil Union   Occupation:  Retired  Patient Pre-hospital Living Situation:  Lives at home  Patient Pre-hospital Level of Mobility: Ambulatory  Patient Pre-hospital Diet Restrictions:  Diabetic  Substance Use History:   Social History     Substance and Sexual Activity   Alcohol Use Not Currently     Social History     Tobacco Use   Smoking Status Former Smoker    Packs/day: 1 50    Years: 50 00    Pack years: 75 00    Start date: 36    Quit date: 2020    Years since quittin 6   Smokeless Tobacco Never Used     Social History     Substance and Sexual Activity   Drug Use No       Family History:  Family History   Problem Relation Age of Onset    Heart disease Father     Other Father         Mesothelioma        Physical Exam:     Vitals:   Blood Pressure: 114/55 (22)  Pulse: (!) 106 (22)  Temperature: 98 1 °F (36 7 °C) (22)  Temp Source: Oral (22)  Respirations: 20 (22)  Weight - Scale: 107 kg (236 lb) (22)  SpO2: 97 % (22)    Constitutional:  Well developed, well nourished, no acute distress, non-toxic appearance   Eyes:  PERRL, conjunctiva normal   HENT:  Atraumatic, external ears normal, nose normal, oropharynx moist, no pharyngeal exudates  Neck- normal range of motion, no tenderness, supple   Respiratory:  No respiratory distress, very coarse breath sounds, decreased breath sounds on right lower lung field  Cardiovascular:  Tachycardic, normal rhythm, no murmurs, no gallops, no rubs   GI:  Soft, nondistended, normal bowel sounds, nontender, no organomegaly, no mass, no rebound, no guarding   :  No costovertebral angle tenderness   Musculoskeletal:  No edema, no tenderness, no deformities  Back- no tenderness  Integument:  Well hydrated, no rash   Lymphatic:  No lymphadenopathy noted   Neurologic:  Alert &awake, communicative, CN 2-12 normal, normal motor function, normal sensory function, no focal deficits noted   Psychiatric:  Speech and behavior appropriate       Lab Results: I have personally reviewed pertinent reports        Results from last 7 days   Lab Units 02/21/22  0322   WBC Thousand/uL 14 76*   HEMOGLOBIN g/dL 10 3*   HEMATOCRIT % 33 4*   PLATELETS Thousands/uL 220   NEUTROS PCT % 83*   LYMPHS PCT % 7*   MONOS PCT % 9   EOS PCT % 0     Results from last 7 days   Lab Units 02/21/22  0322   POTASSIUM mmol/L 4 6   CHLORIDE mmol/L 100   CO2 mmol/L 27   BUN mg/dL 26*   CREATININE mg/dL 1 64*   CALCIUM mg/dL 9 0   ALK PHOS U/L 108   ALT U/L 14   AST U/L 5             Imaging: I have personally reviewed pertinent reports  Current chest x-ray reveals similar right lung base atelectatic changes and pleural effusion with same volume loss  No results found  ** Please Note: Dragon 360 Dictation voice to text software was used in the creation of this document   **

## 2022-02-21 NOTE — CASE MANAGEMENT
Case Management Discharge Planning Note    Patient name Michaelle Krueger  Location ED 17/ED 17 MRN [id number]  : 1945 Date 2022       Current Admission Date: 2022  Current Admission Diagnosis:Left-sided chest wall pain   Patient Active Problem List    Diagnosis Date Noted    Left-sided chest wall pain 2022    CKD (chronic kidney disease) stage 3, GFR 30-59 ml/min ([facility] ) 2022    History of prostate cancer 2022    UTI (urinary tract infection) 2022    [facility] ) 2022    Fracture of navicular bone of left foot 2021    Chronic respiratory failure with hypoxia ([facility] ) 04/15/2021    Obesity, morbid ([facility] ) 2021    PAD (peripheral artery disease) ([facility] 2021    History of lung cancer 2020    DDD (degenerative disc disease), lumbar 2020    Anemia, iron deficiency 2020    Lung nodule 2020    Pulmonary hypertension ([facility] ) 2019    JACQUELINE (obstructive sleep apnea) 2019    COPD with acute exacerbation ([facility] ) 2019    Oxygen dependent 2018    RBBB 2018    CAD (coronary artery disease) 2018    Chronic diastolic heart failure ([facility] ) 2018    Pleural effusion on right 10/31/2017    COPD, severe ([facility] ) 2017    Diabetic neuropathy ([facility] 2017    Essential hypertension 2016    Venous stasis dermatitis of both lower extremities 11/15/2016    Type 2 diabetes mellitus with hyperglycemia, with long-term current use of insulin ([facility] ) 2016    COPD exacerbation ([facility] ) 2016    Obesity (BMI 30-39 9) 2016    HLD (hyperlipidemia) 2016      LOS (days): 0  Geometric Mean LOS (GMLOS) (days):   Days to GMLOS:     OBJECTIVE:  Risk of Unplanned Readmission Score: 31         Current admission status: Inpatient   Preferred Pharmacy:   [address] [place], [place] [address] [place], [place] [id number]  Phone: [phone] Fax: [phone]    Primary Care Provider: Nick Choudhury MD    Primary Insurance: [facility]  Secondary Insurance:     DISCHARGE DETAILS:                                                                                                 Additional Comments: CM attepted to meet with pt at bedside to complete Open however pt was asleep  CM attempted to gently awaken pt at which time pt requested that CM come back at a later time  CM will attempt to complete Open at a later time

## 2022-02-21 NOTE — ASSESSMENT & PLAN NOTE
Lab Results   Component Value Date    HGBA1C 8 8 (H) 02/21/2022     On insulin regular  45 units twice daily with insulin sliding scale      Blood Sugar Average: Last 72 hrs:  (P) 416

## 2022-02-21 NOTE — ASSESSMENT & PLAN NOTE
· Patient came in with new onset left chest wall pain that is almost 24 hours old and is worsening throughout the night he cannot sleep  This really sounds non-cardiac and more pleuritic or muscular in nature  · Noted the patient also has worsening hypoxia from 3 L O2 with saturation of 96 to 97% currently he is 92  · Could be pleurisy/costrochondriotis in relation to COPD exacerbation  · Patient is also due for CT scan of the chest for follow-up adeno carcinoma of the right lung (see note of January 30, 2022 discharge summary); patient has not had such study done  Emergency room ordered for CTA chest PE protocol- reviewed results with patient  · Pulmonary consult  · Pain control:  Geriatric pain protocol    · Will also continue with oxygen supplementation:  3 L nasal cannula O2- at current baseline

## 2022-02-21 NOTE — ED ATTENDING ATTESTATION
2/21/2022  I, Charles Randle MD, saw and evaluated the patient  I have discussed the patient with the resident/non-physician practitioner and agree with the resident's/non-physician practitioner's findings, Plan of Care, and MDM as documented in the resident's/non-physician practitioner's note, except where noted  All available labs and Radiology studies were reviewed  I was present for key portions of any procedure(s) performed by the resident/non-physician practitioner and I was immediately available to provide assistance  At this point I agree with the current assessment done in the Emergency Department  I have conducted an independent evaluation of this patient a history and physical is as follows:    ED Course      Emergency Department Note- Rachel Traore  78 y o  male MRN: [id number]    Unit/Bed#: ED 17 Encounter: [id number]    Rachel Traore  is a 78 y o  male who presents with   Chief Complaint   Patient presents with    Chest Pain     Pt c/o severe left sided cp and sob since this morning  Pt states he has a hx of copd and was recently admitted/ discharged from [facility]         History of Present Illness   HPI:  Barb Costa Sr  is a 78 y o  male who presents for evaluation of:  Beryle Cross left-sided chest pain associated with dyspnea over the last 24 hours  The patient notes that he has a history of COPD and continues to smoke cigarettes  Any sort of activity provokes his dyspnea  He has a cough that is nonproductive of sputum  He denies any associated hemoptysis  He is dyspneic at rest   The chest pain is sharp and lateral to his midclavicular line on the left side  Review of Systems   Constitutional: Negative for chills and fever  HENT: Negative for congestion and rhinorrhea  Respiratory: Positive for cough and shortness of breath  Cardiovascular: Negative for leg swelling  Gastrointestinal: Negative for abdominal pain, nausea and vomiting     Genitourinary: Negative for flank pain and hematuria  Neurological: Negative for light-headedness and headaches  All other systems reviewed and are negative  Review of electronic medical record:  Patient had a recent admission for Klebsiella pneumonia sepsis of urinary origin associated with acute kidney injury and COPD exacerbation  Historical Information   Past Medical History:   Diagnosis Date    Abdominal pain     MARANDA (acute kidney injury) ([facility] ) 2018    Cardiac disease     CHF (congestive heart failure) (HCC)     COPD, severe (HCC)     Coronary artery disease     DDD (degenerative disc disease), lumbar 2020    Diabetes mellitus ([facility] )     Dyspnea     GERD (gastroesophageal reflux disease)     Hyperlipidemia     Hypertension     MI (myocardial infarction) ([facility] )     with 3 stents    Nodule of apex of right lung     JACQUELINE (obstructive sleep apnea)     Prostate cancer [facility])      Past Surgical History:   Procedure Laterality Date    ABDOMINAL SURGERY      exploratory    ANGIOPLASTY      3 stents    APPENDECTOMY      COLONOSCOPY      CT NEEDLE BIOPSY LUNG  11/3/2020    ESOPHAGOGASTRODUODENOSCOPY N/A 10/2/2017    Procedure: ESOPHAGOGASTRODUODENOSCOPY (EGD); Surgeon: Thomas Simon MD;  Location: [facility];   Service: Gastroenterology    IR THORACENTESIS  11/3/2020    KNEE CARTILAGE SURGERY      OTHER SURGICAL HISTORY      stent placement    PROSTATE SURGERY      SKIN GRAFT      Basal cell CA back     Social History   Social History     Substance and Sexual Activity   Alcohol Use Not Currently     Social History     Substance and Sexual Activity   Drug Use No     Social History     Tobacco Use   Smoking Status Former Smoker    Packs/day: 1 50    Years: 50 00    Pack years: 75 00    Start date: 38    Quit date: 2020    Years since quittin 6   Smokeless Tobacco Never Used     Family History:   Family History   Problem Relation Age of Onset    Heart disease Father     Other Father Mesothelioma        Meds/Allergies   PTA meds:   Prior to Admission Medications   Prescriptions Last Dose Informant Patient Reported? Taking? BD Insulin Syringe U-500 31G X 6MM 0 5 ML MISC   No No   Sig: USE 1 SYRINGE THREE TIMES A DAY FOR INJECTING INSULIN   HUMULIN R 500 UNIT/ML CONCENTRATED injection   No No   Sig: INJECT 45 UNITS TWICE DAILY BEFORE BREAKFAST AND LUNCH   OneTouch Ultra test strip   No No   Sig: TEST FOUR TIMES A DAY   Trelegy Ellipta 100-62 5-25 MCG/INH inhaler   No No   Sig: INHALE 1 PUFF BY MOUTH DAILY *RINSE MOUTH AFTER USE*   cyclobenzaprine (FLEXERIL) 10 mg tablet   No No   Sig: TAKE 1 TABLET BY MOUTH THREE TIMES A DAY   ferrous sulfate 325 (65 Fe) mg tablet   No No   Sig: Take 1 tablet (325 mg total) by mouth daily with breakfast   fluticasone-vilanterol (BREO ELLIPTA) 100-25 mcg/inh inhaler   No No   Sig: Inhale 1 puff daily Rinse mouth after use     furosemide (LASIX) 40 mg tablet   No No   Sig: Take 1 tablet (40 mg total) by mouth daily   metoprolol tartrate (LOPRESSOR) 25 mg tablet   No No   Sig: TAKE ONE TABLET BY MOUTH TWICE A DAY   oxyCODONE-acetaminophen (PERCOCET) 7 5-325 MG per tablet  Self Yes No   Sig: Take by mouth every 4 (four) hours     potassium chloride (K-DUR,KLOR-CON) 20 mEq tablet   No No   Sig: Take 1 tablet (20 mEq total) by mouth daily   simvastatin (ZOCOR) 40 mg tablet   No No   Sig: TAKE ONE TABLET BY MOUTH EVERY DAY      Facility-Administered Medications: None     Allergies   Allergen Reactions    Crestor [Rosuvastatin] Other (See Comments)     Unknown      Metformin GI Intolerance       Objective   First Vitals:   Blood Pressure: 160/74 (02/21/22 0249)  Pulse: (!) 111 (02/21/22 0252)  Temperature: 98 1 °F (36 7 °C) (02/21/22 0249)  Temp Source: Oral (02/21/22 0249)  Respirations: 20 (02/21/22 0249)  Weight - Scale: 107 kg (236 lb) (02/21/22 0249)  SpO2: 100 % (02/21/22 0252)    Current Vitals:   Blood Pressure: 124/81 (02/21/22 0555)  Pulse: 104 (02/21/22 0555)  Temperature: 98 1 °F (36 7 °C) (02/21/22 0249)  Temp Source: Oral (02/21/22 0249)  Respirations: 20 (02/21/22 0555)  Weight - Scale: 107 kg (236 lb) (02/21/22 0249)  SpO2: 99 % (02/21/22 0555)    No intake or output data in the 24 hours ending 02/21/22 0559    Invasive Devices  Report    Peripheral Intravenous Line            Peripheral IV 02/21/22 Left Antecubital <1 day                Physical Exam  Vitals and nursing note reviewed  Constitutional:       General: He is in acute distress ( secondary to respiratory status)  Appearance: Normal appearance  He is well-developed  HENT:      Head: Normocephalic and atraumatic  Right Ear: External ear normal       Left Ear: External ear normal       Nose: Nose normal       Mouth/Throat:      Pharynx: No oropharyngeal exudate  Eyes:      Conjunctiva/sclera: Conjunctivae normal       Pupils: Pupils are equal, round, and reactive to light  Cardiovascular:      Rate and Rhythm: Normal rate and regular rhythm  Pulmonary:      Effort: Respiratory distress (Mild) present  Breath sounds: Examination of the right-lower field reveals decreased breath sounds and rhonchi  Examination of the left-lower field reveals decreased breath sounds and rhonchi  Decreased breath sounds and rhonchi present  Abdominal:      General: Abdomen is flat  There is no distension  Musculoskeletal:         General: No deformity  Normal range of motion  Cervical back: Normal range of motion and neck supple  Skin:     General: Skin is warm and dry  Capillary Refill: Capillary refill takes less than 2 seconds  Neurological:      General: No focal deficit present  Mental Status: He is alert and oriented to person, place, and time  Mental status is at baseline  Coordination: Coordination normal    Psychiatric:         Mood and Affect: Mood normal          Behavior: Behavior normal          Thought Content:  Thought content normal          Judgment: Judgment normal            Medical Decision Makin  Acute dyspnea secondary to COPD exacerbation and chronic right lung abnormality:  2   Acute sharp chest pain:  Pleuritic; plan to obtain CT angiogram chest rule out PE    Recent Results (from the past 36 hour(s))   CBC and differential    Collection Time: 22  3:22 AM   Result Value Ref Range    WBC 14 76 (H) 4 31 - 10 16 Thousand/uL    RBC 4 04 3 88 - 5 62 Million/uL    Hemoglobin 10 3 (L) 12 0 - 17 0 g/dL    Hematocrit 33 4 (L) 36 5 - 49 3 %    MCV 83 82 - 98 fL    MCH 25 5 (L) 26 8 - 34 3 pg    MCHC 30 8 (L) 31 4 - 37 4 g/dL    RDW 15 4 (H) 11 6 - 15 1 %    MPV 10 8 8 9 - 12 7 fL    Platelets 755 155 - 371 Thousands/uL    nRBC 0 /100 WBCs    Neutrophils Relative 83 (H) 43 - 75 %    Immat GRANS % 1 0 - 2 %    Lymphocytes Relative 7 (L) 14 - 44 %    Monocytes Relative 9 4 - 12 %    Eosinophils Relative 0 0 - 6 %    Basophils Relative 0 0 - 1 %    Neutrophils Absolute 12 23 (H) 1 85 - 7 62 Thousands/µL    Immature Grans Absolute 0 07 0 00 - 0 20 Thousand/uL    Lymphocytes Absolute 1 08 0 60 - 4 47 Thousands/µL    Monocytes Absolute 1 31 (H) 0 17 - 1 22 Thousand/µL    Eosinophils Absolute 0 04 0 00 - 0 61 Thousand/µL    Basophils Absolute 0 03 0 00 - 0 10 Thousands/µL   HS Troponin 0hr (reflex protocol)    Collection Time: 22  3:22 AM   Result Value Ref Range    hs TnI 0hr 16 "Refer to ACS Flowchart"- see link ng/L   Comprehensive metabolic panel    Collection Time: 22  3:22 AM   Result Value Ref Range    Sodium 132 (L) 136 - 145 mmol/L    Potassium 4 6 3 5 - 5 3 mmol/L    Chloride 100 100 - 108 mmol/L    CO2 27 21 - 32 mmol/L    ANION GAP 5 4 - 13 mmol/L    BUN 26 (H) 5 - 25 mg/dL    Creatinine 1 64 (H) 0 60 - 1 30 mg/dL    Glucose 373 (H) 65 - 140 mg/dL    Calcium 9 0 8 3 - 10 1 mg/dL    Corrected Calcium 9 9 8 3 - 10 1 mg/dL    AST 5 5 - 45 U/L    ALT 14 12 - 78 U/L    Alkaline Phosphatase 108 46 - 116 U/L    Total Protein 8 4 (H) 6 4 - 8 2 g/dL    Albumin 2 9 (L) 3 5 - 5 0 g/dL    Total Bilirubin 0 26 0 20 - 1 00 mg/dL    eGFR 40 ml/min/1 73sq m   COVID/FLU/RSV - 2 hour TAT    Collection Time: 02/21/22  3:22 AM    Specimen: Nose; Nares   Result Value Ref Range    SARS-CoV-2 Negative Negative    INFLUENZA A PCR Negative Negative    INFLUENZA B PCR Negative Negative    RSV PCR Negative Negative   Hemoglobin A1c w/EAG Estimation (Orders if not completed within the last 90 days)    Collection Time: 02/21/22  5:21 AM   Result Value Ref Range    Hemoglobin A1C 8 8 (H) Normal 3 8-5 6%; PreDiabetic 5 7-6 4%; Diabetic >=6 5%; Glycemic control for adults with diabetes <7 0% %     mg/dl   HS Troponin I 2hr    Collection Time: 02/21/22  5:21 AM   Result Value Ref Range    hs TnI 2hr 15 "Refer to ACS Flowchart"- see link ng/L    Delta 2hr hsTnI -1 <20 ng/L     CTA ED chest PE study   Final Result      No evidence of pulmonary embolus  Findings consistent with bronchiolitis secondary to infection or aspiration at the anterior left upper lobe/lingula  Stable right-sided volume loss with areas of rounded atelectasis  Workstation performed: [id number]         X-ray chest 1 view portable    (Results Pending)         Portions of the record may have been created with voice recognition software  Occasional wrong word or "sound a like" substitutions may have occurred due to the inherent limitations of voice recognition software  Read the chart carefully and recognize, using context, where substitutions have occurred            Critical Care Time  Procedures

## 2022-02-21 NOTE — PROGRESS NOTES
[address] [facility]  Progress Note - Segundo Elam Sr  1945, 78 y o  male MRN: [id number]  Unit/Bed#: ED 17 Encounter: [id number]  Primary Care Provider: Yanira Thomas MD   Date and time admitted to hospital: 2/21/2022  2:45 AM    * Left-sided chest wall pain  Assessment & Plan  · Patient came in with new onset left chest wall pain that is almost 24 hours old and is worsening throughout the night he cannot sleep  This really sounds non-cardiac and more pleuritic or muscular in nature  · Noted the patient also has worsening hypoxia from 3 L O2 with saturation of 96 to 97% currently he is 92  · Could be pleurisy/costrochondriotis in relation to COPD exacerbation  · Patient is also due for CT scan of the chest for follow-up adeno carcinoma of the right lung (see note of January 30, 2022 discharge summary); patient has not had such study done  Emergency room ordered for CTA chest PE protocol- reviewed results with patient  · Pulmonary consult  · Pain control:  Geriatric pain protocol  · Will also continue with oxygen supplementation:  3 L nasal cannula O2- at current baseline     COPD with acute exacerbation ([facility] )  Assessment & Plan  · Patient is noted to have worse hypoxia with current use of 3 L O2 (is on home O2 3 L) noted saturation from usual 96 to 97 currently at 92  · Started patient on prednisone 40 mg daily  · Continue Xopenex inhalation as needed  · On Brio Ellipta 100/251 puff daily  · Also continue Atrovent t i d  · Pulmonary consult will be appreciated     Type 2 diabetes mellitus with hyperglycemia, with long-term current use of insulin [facility])  Assessment & Plan  Lab Results   Component Value Date    HGBA1C 8 8 (H) 02/21/2022     On insulin regular  45 units twice daily with insulin sliding scale      Blood Sugar Average: Last 72 hrs:  (P) 416    Adenocarcinoma of lung ([facility] )  Assessment & Plan  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  · Completed SBRT to [facility] on 12/31/20  · Last seen by radiation oncology on 9/14/21  · He had CTA study on July 23, 2021 - "Stable right lung volume loss secondary to areas of rounded atelectasis at the right middle and lower lobes  Small/moderate size right pleural effusion "  · CXR this admission - "Stable right lung volume loss with known right lung base round atelectasis and loculated pleural effusion "  · Recommended to have repeat CT chest January 2022 and f/u as outpatient- this was done and did not show any acute findings   · Continue to recommend outpatient f/u    Hence will get CT scan of the chest tonight  Pulmonary consult  Pleural effusion on right  Assessment & Plan  · According to previous reports this has been stable  · No evidence on CT from today     CKD (chronic kidney disease) stage 3, GFR 30-59 ml/min [facility])  Assessment & Plan  Lab Results   Component Value Date    EGFR 40 02/21/2022    EGFR 50 01/30/2022    EGFR 46 01/29/2022    CREATININE 1 64 (H) 02/21/2022    CREATININE 1 37 (H) 01/30/2022    CREATININE 1 46 (H) 01/29/2022   Current creatinine is 1 64 with GFR 40; will continue follow up creatinine levels  VTE Pharmacologic Prophylaxis:   Moderate Risk (Score 3-4) - Pharmacological DVT Prophylaxis Ordered: enoxaparin (Lovenox)  Patient Centered Rounds: I performed bedside rounds with nursing staff today  Discussions with Specialists or Other Care Team Provider: Primary RN    Education and Discussions with Family / Patient: Patient declined call to   Time Spent for Care: 30 minutes  More than 50% of total time spent on counseling and coordination of care as described above      Current Length of Stay: 0 day(s)  Current Patient Status: Inpatient   Certification Statement: The patient will continue to require additional inpatient hospital stay due to leftsided chest pain wiht h/o COPD  Discharge Plan: Anticipate discharge tomorrow to home  Code Status: Level 1 - Full Code    Subjective:   Still with left-sided chest pain today  He states that the steroids helped with his pain however starting to come back  He is requesting something stronger for pain as he was taking Flexeril at home with no relief  He states his son works at night so he is alone in the house when the pain started  He has not noticed any increasing coughing and no muscle strains  He has no fever or chills and does not feel sick  He has no history of aspirating  Objective:     Vitals:   Temp (24hrs), Av 1 °F (36 7 °C), Min:98 1 °F (36 7 °C), Max:98 1 °F (36 7 °C)    Temp:  [98 1 °F (36 7 °C)] 98 1 °F (36 7 °C)  HR:  [] 72  Resp:  [20] 20  BP: (114-160)/(55-81) 140/65  SpO2:  [97 %-100 %] 100 %  Body mass index is 32 01 kg/m²  Input and Output Summary (last 24 hours):   No intake or output data in the 24 hours ending 22 1119    Physical Exam:   Physical Exam  Constitutional:       Appearance: Normal appearance  He is not ill-appearing  HENT:      Head: Normocephalic  Nose: Nose normal       Mouth/Throat:      Mouth: Mucous membranes are moist    Cardiovascular:      Rate and Rhythm: Normal rate and regular rhythm  Pulses: Normal pulses  Heart sounds: Normal heart sounds  Pulmonary:      Effort: Pulmonary effort is normal  No respiratory distress  Breath sounds: Normal breath sounds  No wheezing  Abdominal:      General: Abdomen is flat  Palpations: Abdomen is soft  Musculoskeletal:         General: Normal range of motion  Cervical back: Normal range of motion and neck supple  Skin:     General: Skin is warm and dry  Capillary Refill: Capillary refill takes less than 2 seconds  Neurological:      General: No focal deficit present  Mental Status: He is alert and oriented to person, place, and time     Psychiatric:         Mood and Affect: Mood normal          Behavior: Behavior normal             Additional Data:     Labs:  Results from last 7 days   Lab Units 02/21/22  0322   WBC Thousand/uL 14 76*   HEMOGLOBIN g/dL 10 3*   HEMATOCRIT % 33 4*   PLATELETS Thousands/uL 220   NEUTROS PCT % 83*   LYMPHS PCT % 7*   MONOS PCT % 9   EOS PCT % 0     Results from last 7 days   Lab Units 02/21/22  0322   SODIUM mmol/L 132*   POTASSIUM mmol/L 4 6   CHLORIDE mmol/L 100   CO2 mmol/L 27   BUN mg/dL 26*   CREATININE mg/dL 1 64*   ANION GAP mmol/L 5   CALCIUM mg/dL 9 0   ALBUMIN g/dL 2 9*   TOTAL BILIRUBIN mg/dL 0 26   ALK PHOS U/L 108   ALT U/L 14   AST U/L 5   GLUCOSE RANDOM mg/dL 373*         Results from last 7 days   Lab Units 02/21/22  0744   POC GLUCOSE mg/dl 416*     Results from last 7 days   Lab Units 02/21/22  0521   HEMOGLOBIN A1C % 8 8*           Lines/Drains:  Invasive Devices  Report    Peripheral Intravenous Line            Peripheral IV 02/21/22 Left Antecubital <1 day                      Imaging: Reviewed radiology reports from this admission including: chest CT scan    Recent Cultures (last 7 days):         Last 24 Hours Medication List:   Current Facility-Administered Medications   Medication Dose Route Frequency Provider Last Rate    acetaminophen  650 mg Oral Q6H PRN Courtney Menjivar MD      aluminum-magnesium hydroxide-simethicone  30 mL Oral Q6H PRN Courtney Menjivar MD      amoxicillin-clavulanate  1 tablet Oral Q12H [address] [facility],       cyclobenzaprine  10 mg Oral TID Courtney Menjivar MD      docusate sodium  100 mg Oral BID PRN Courtney Menjivar MD      enoxaparin  40 mg Subcutaneous Daily Courntey Menjivar MD      ferrous sulfate  325 mg Oral Daily With Breakfast Courtney Menjivar MD      fluticasone-vilanterol  1 puff Inhalation Daily Courtney Menjivar MD      furosemide  40 mg Oral Daily Courtney Menjivar MD      guaiFENesin  600 mg Oral BID Courtney Menjivar MD      HYDROmorphone  0 5 mg Intravenous Q4H PRN Courtney Menjivar MD      insulin lispro  1-5 Units Subcutaneous HS Linh Lopez MD      insulin lispro  1-6 Units Subcutaneous TID [facility] Linh Lopez MD      insulin regular  45 Units Subcutaneous BID AC Linh Lopez MD      ipratropium-albuterol  3 mL Nebulization Q6H PRN Oh Meyers DO      lidocaine  1 patch Topical Daily Odilia Rosenthal DO      metoprolol tartrate  25 mg Oral BID Linh Lopez MD      ondansetron  4 mg Intravenous Q6H PRN Linh Lopez MD      oxyCODONE  2 5 mg Oral Q4H PRN Linh Lopez MD      oxyCODONE  5 mg Oral Q4H PRN Linh Lopez MD      potassium chloride  20 mEq Oral Daily Linh Lopez MD      pravastatin  80 mg Oral Daily With Kristi Gunter MD      sodium chloride (PF)  3 mL Intravenous Q1H PRN Linh Lopez MD      tiotropium  18 mcg Inhalation Daily Oh Meyers DO          Today, Patient Was Seen By: YOLANDE Santana    **Please Note: This note may have been constructed using a voice recognition system  **

## 2022-02-21 NOTE — CONSULTS
PULMONOLOGY CONSULT NOTE     Name: Mehrdad Pardo    Age & Sex: 78 y o  male   MRN: [id number]  Unit/Bed#: ED 16   Encounter: [id number]        Reason for consultation:  Adenocarcinoma of right lung, COPD, pleuritic left-sided chest pain    Requesting physician: Tejal rOtiz MD    Assessment:     1  Left-sided pleuritic chest pain  -suspect costochondritis/pleurisy versus pneumonia/pneumonitis  -chest CT with consolidation in the apical region of left upper lobe/lingula, CBC elevated prior to steroids, patient tachycardic  -pain reproducible with palpation  -procalcitonin pending  -COVID negative    2  Chronic hypoxic respiratory failure - on 3 L nasal cannula baseline  -currently saturating over 95% on 3 L nasal cannula and does not appear to be in any discomfort or distress    3  Severe COPD - not in exacerbation    4  Stage I A adenocarcinoma of the right lung - status post SBRT    5  Diastolic CHF - does not appear volume overloaded    6  CKD 3    7  History of nicotine dependence        Plan:    -continue supplemental oxygen and titrate as tolerated; goal SpO2 > 88%  -discontinue steroids; not in COPD exacerbation  -DuoNeb p r n   -continue Breo and add Spiriva; patient may resume home Trelegy as an outpatient (not on hospital formulary)  -start p o  Augmentin 875 mg b i d  X5 days  -pleural effusion appears stable  -diuretics per primary  -continue respiratory and airway clearance protocol  -out of bed as tolerated and incentive spirometry      History of Present Illness   HPI:  Dakota Ibarra Sr  is a 78 y o  male with stage I A adenocarcinoma of the right upper lobe status post radiation, chronic hypoxic respiratory failure on 3 L nasal cannula at baseline, recurrent right-sided pleural effusion status post thoracentesis x3, chronic diastolic CHF, CKD 3, severe COPD, insulin-dependent type 2 diabetes presenting for evaluation of left sided    Patient had sharp left-sided pleuritic chest pain along his inferior ribs  He states that the chest pain worsens with taking a deep breath and he felt some chest tightness  Denied significant shortness of breath, excessive sputum production, wheezing  Denied fever, chills, night sweats  In the emergency department patient had oxygen saturation of 93% on his 3 L nasal cannula oxygen lung with coarse breath sounds  Additionally patient was tachycardic and had WBC prior to administration of prednisone  He was administered a DuoNeb nebulization started on steroids for suspected COPD exacerbation  He had chest CT which revealed infiltration/consolidation in the lingula and chronic right lung changes  Currently the patient continues to report left-sided chest wall pain  Review of systems:  12 point review of systems was completed and was otherwise negative except as listed in HPI  Historical Information   Past Medical History:   Diagnosis Date    Abdominal pain     [facility] 6[facility]19/2018    Cardiac disease     CHF (congestive heart failure) (HCC)     COPD, severe (HCC)     Coronary artery disease     DDD (degenerative disc disease), lumbar 9/1/2020    Diabetes mellitus [facility]     Dyspnea     GERD (gastroesophageal reflux disease)     Hyperlipidemia     Hypertension     MI [facility]ocardial infarction[facility] ([facility] )     with 3 stents    Nodule of apex of right lung     JACQUELINE (obstructive sleep apnea)     Prostate cancer [facility])      Past Surgical History:   Procedure Laterality Date    ABDOMINAL SURGERY      exploratory    ANGIOPLASTY      3 stents    APPENDECTOMY      COLONOSCOPY  2015    CT NEEDLE BIOPSY LUNG  11/3/2020    ESOPHAGOGASTRODUODENOSCOPY N/A 10/2/2017    Procedure: ESOPHAGOGASTRODUODENOSCOPY (EGD); Surgeon: Keila Faulkner MD;  Location: [facility];   Service: Gastroenterology    IR THORACENTESIS  11/3/2020    KNEE CARTILAGE SURGERY      OTHER SURGICAL HISTORY      stent placement    PROSTATE SURGERY      SKIN GRAFT Basal cell CA back     Family History   Problem Relation Age of Onset    Heart disease Father     Other Father         Mesothelioma        Occupational History:  Noncontributory    Social History: Former smoker  Quit 7/13/20  1 5 packs per day for 50 years    75 pack-year smoking history    Meds/Allergies   Current Facility-Administered Medications   Medication Dose Route Frequency    acetaminophen (TYLENOL) tablet 650 mg  650 mg Oral Q6H PRN    aluminum-magnesium hydroxide-simethicone (MYLANTA) oral suspension 30 mL  30 mL Oral Q6H PRN    amoxicillin-clavulanate (AUGMENTIN) 875-125 mg per tablet 1 tablet  1 tablet Oral Q12H [address]    cyclobenzaprine (FLEXERIL) tablet 10 mg  10 mg Oral TID    docusate sodium (COLACE) capsule 100 mg  100 mg Oral BID PRN    enoxaparin (LOVENOX) subcutaneous injection 40 mg  40 mg Subcutaneous Daily    ferrous sulfate tablet 325 mg  325 mg Oral Daily With Breakfast    fluticasone-vilanterol (BREO ELLIPTA) 100-25 mcg/inh inhaler 1 puff  1 puff Inhalation Daily    furosemide (LASIX) tablet 40 mg  40 mg Oral Daily    guaiFENesin (MUCINEX) 12 hr tablet 600 mg  600 mg Oral BID    HYDROmorphone (DILAUDID) injection 0 5 mg  0 5 mg Intravenous Q4H PRN    insulin lispro (HumaLOG) 100 units/mL subcutaneous injection 1-5 Units  1-5 Units Subcutaneous HS    insulin lispro (HumaLOG) 100 units/mL subcutaneous injection 1-6 Units  1-6 Units Subcutaneous TID AC    insulin regular (HumuLIN R U-500) 500 units/mL CONCENTRATED injection 45 Units  45 Units Subcutaneous BID AC    ipratropium-albuterol (DUO-NEB) 0 5-2 5 mg/3 mL inhalation solution 3 mL  3 mL Nebulization Q6H PRN    lidocaine (LIDODERM) 5 % patch 1 patch  1 patch Topical Daily    metoprolol tartrate (LOPRESSOR) tablet 25 mg  25 mg Oral BID    ondansetron (ZOFRAN) injection 4 mg  4 mg Intravenous Q6H PRN    oxyCODONE (ROXICODONE) IR tablet 2 5 mg  2 5 mg Oral Q4H PRN    oxyCODONE (ROXICODONE) IR tablet 5 mg  5 mg Oral Q4H PRN    potassium chloride (K-DUR,KLOR-CON) CR tablet 20 mEq  20 mEq Oral Daily    pravastatin (PRAVACHOL) tablet 80 mg  80 mg Oral Daily With Dinner    sodium chloride (PF) 0 9 % injection 3 mL  3 mL Intravenous Q1H PRN    tiotropium (SPIRIVA) capsule for inhaler 18 mcg  18 mcg Inhalation Daily     (Not in a hospital admission)    Allergies   Allergen Reactions    Crestor [Rosuvastatin] Other (See Comments)     Unknown      Metformin GI Intolerance       Vitals: Blood pressure 140/65, pulse 72, temperature 98 1 °F (36 7 °C), temperature source Oral, resp  rate 20, weight 107 kg (236 lb), SpO2 100 %  , Body mass index is 32 01 kg/m²  No intake or output data in the 24 hours ending 02/21/22 1122    Physical Exam  Vitals and nursing note reviewed  Constitutional:       General: He is not in acute distress  Appearance: He is obese  He is not ill-appearing, toxic-appearing or diaphoretic  HENT:      Head: Normocephalic and atraumatic  Right Ear: External ear normal       Left Ear: External ear normal       Nose: Nose normal    Eyes:      General: No scleral icterus  Right eye: No discharge  Left eye: No discharge  Extraocular Movements: Extraocular movements intact  Conjunctiva/sclera: Conjunctivae normal    Cardiovascular:      Rate and Rhythm: Normal rate and regular rhythm  Pulses: Normal pulses  Heart sounds: Normal heart sounds  No murmur heard  No friction rub  No gallop  Pulmonary:      Effort: Pulmonary effort is normal  No respiratory distress  Breath sounds: No stridor  Examination of the right-upper field reveals rhonchi  Examination of the left-upper field reveals rhonchi  Examination of the right-lower field reveals decreased breath sounds  Decreased breath sounds and rhonchi present  No wheezing or rales  Chest:      Chest wall: Tenderness present  No swelling, crepitus or edema     Abdominal:      General: Bowel sounds are normal  Palpations: Abdomen is soft  Tenderness: There is no guarding or rebound  Hernia: No hernia is present  Musculoskeletal:      Right lower leg: No edema  Left lower leg: No edema  Skin:     General: Skin is warm and dry  Neurological:      Mental Status: He is alert and oriented to person, place, and time  Labs: I have personally reviewed pertinent lab results  Laboratory and Diagnostics  Results from last 7 days   Lab Units 02/21/22  0322   WBC Thousand/uL 14 76*   HEMOGLOBIN g/dL 10 3*   HEMATOCRIT % 33 4*   PLATELETS Thousands/uL 220   NEUTROS PCT % 83*   MONOS PCT % 9     Results from last 7 days   Lab Units 02/21/22  0322   SODIUM mmol/L 132*   POTASSIUM mmol/L 4 6   CHLORIDE mmol/L 100   CO2 mmol/L 27   ANION GAP mmol/L 5   BUN mg/dL 26*   CREATININE mg/dL 1 64*   CALCIUM mg/dL 9 0   GLUCOSE RANDOM mg/dL 373*   ALT U/L 14   AST U/L 5   ALK PHOS U/L 108   ALBUMIN g/dL 2 9*   TOTAL BILIRUBIN mg/dL 0 26         Imaging and other studies: I have personally reviewed pertinent reports  and I have personally reviewed pertinent films in PACS    CTA pulmonary embolism study 2/21/22:  No pulmonary embolism  Consolidation in anterior upper portion of left upper lobe/lingula  Tree-in-bud nodularity in the lingula  Right lung volume loss and round atelectasis and right middle lobe  Small right pleural effusion  Pulmonary function testing:     FEV1/FVC Ratio: 41 %  Forced Vital Capacity: 2 72 L    61 % predicted  FEV1: 1 11 L     33 % predicted     Lung volumes by body plethysmography:   Total Lung Capacity 89 % predicted   Residual volume 144 % predicted     DLCO corrected for patients hemoglobin level: 36 %    EKG, Pathology, and Other Studies: I have personally reviewed pertinent reports  and I have personally reviewed pertinent films in PACS    Echocardiogram 7/30/20:  Ejection fraction 68% with grade 1 diastolic dysfunction  Right ventricle dilated      Code Status: Level 1 - Full Code      Portions of the record may have been created with voice recognition software  Occasional wrong word or "sound a like" substitutions may have occurred due to the inherent limitations of voice recognition software  Read the chart carefully and recognize, using context, where substitutions have occurred      DO [facility]  Pulmonary/Critical Care Fellowship, PGY-5

## 2022-02-21 NOTE — PLAN OF CARE
Problem: Potential for Falls  Goal: Patient will remain free of falls  Description: INTERVENTIONS:  - Educate patient/family on patient safety including physical limitations  - Instruct patient to call for assistance with activity   - Consult OT/PT to assist with strengthening/mobility   - Keep Call bell within reach  - Keep bed low and locked with side rails adjusted as appropriate  - Keep care items and personal belongings within reach  - Initiate and maintain comfort rounds  - Make Fall Risk Sign visible to staff  - Offer Toileting every 2 Hours, in advance of need  - Obtain necessary fall risk management equipment  - Apply yellow socks and bracelet for high fall risk patients  - Consider moving patient to room near nurses station  Outcome: Progressing

## 2022-02-21 NOTE — ASSESSMENT & PLAN NOTE
Lab Results   Component Value Date    HGBA1C 9 2 (H) 04/14/2021     On insulin regular  45 units twice daily with insulin sliding scale      Blood Sugar Average: Last 72 hrs:

## 2022-02-21 NOTE — ED PROVIDER NOTES
History  Chief Complaint   Patient presents with    Chest Pain     Pt c/o severe left sided cp and sob since this morning  Pt states he has a hx of copd and was recently admitted/ discharged from [facility]     77 y/o male with hx of HTN, DM, CAD, CHF, and COPD presents to the ED for evaluation of pleuritic left chest pain and increased dyspnea this started yesterday morning (0600, approximately 21 hours ago)  The patient was recently seen and admitted for similar symptoms at this hospital   His symptoms are exacerbated with activity and he has a cough that is nonproductive  His chest pain is sharp, nonradiating, located in the left anterolateral chest wall  He denies fever, chills, hemoptysis, nausea, vomiting, diarrhea, urinary symptoms, back pain, neck pain, headache, numbness, or weakness  Prior to Admission Medications   Prescriptions Last Dose Informant Patient Reported? Taking? BD Insulin Syringe U-500 31G X 6MM 0 5 ML MISC 2/21/2022 at Unknown time  No Yes   Sig: USE 1 SYRINGE THREE TIMES A DAY FOR INJECTING INSULIN   HUMULIN R 500 UNIT/ML CONCENTRATED injection 2/21/2022 at Unknown time  No Yes   Sig: INJECT 45 UNITS TWICE DAILY BEFORE BREAKFAST AND LUNCH   OneTouch Ultra test strip 2/21/2022 at Unknown time  No Yes   Sig: TEST FOUR TIMES A DAY   Trelegy Ellipta 100-62 5-25 MCG/INH inhaler 2/21/2022 at Unknown time  No Yes   Sig: INHALE 1 PUFF BY MOUTH DAILY *RINSE MOUTH AFTER USE*   cyclobenzaprine (FLEXERIL) 10 mg tablet 2/21/2022 at Unknown time  No Yes   Sig: TAKE 1 TABLET BY MOUTH THREE TIMES A DAY   ferrous sulfate 325 (65 Fe) mg tablet 2/21/2022 at Unknown time  No Yes   Sig: Take 1 tablet (325 mg total) by mouth daily with breakfast   fluticasone-vilanterol (BREO ELLIPTA) 100-25 mcg/inh inhaler 2/21/2022 at Unknown time  No Yes   Sig: Inhale 1 puff daily Rinse mouth after use     furosemide (LASIX) 40 mg tablet 2/21/2022 at Unknown time  No Yes   Sig: Take 1 tablet (40 mg total) by mouth daily metoprolol tartrate (LOPRESSOR) 25 mg tablet 2/21/2022 at Unknown time  No Yes   Sig: TAKE ONE TABLET BY MOUTH TWICE A DAY   oxyCODONE-acetaminophen (PERCOCET) 7 5-325 MG per tablet 2/21/2022 at Unknown time Self Yes Yes   Sig: Take by mouth every 4 (four) hours     potassium chloride (K-DUR,KLOR-CON) 20 mEq tablet 2/21/2022 at Unknown time  No Yes   Sig: Take 1 tablet (20 mEq total) by mouth daily   simvastatin (ZOCOR) 40 mg tablet 2/21/2022 at Unknown time  No Yes   Sig: TAKE ONE TABLET BY MOUTH EVERY DAY      Facility-Administered Medications: None       Past Medical History:   Diagnosis Date    Abdominal pain     MARANDA (acute kidney injury) ([place] [address] ) 6/19/2018    Cardiac disease     CHF (congestive heart failure) ([facility])     COPD, severe ([place] [address] )     Coronary artery disease     DDD (degenerative disc disease), lumbar 9/1/2020    Diabetes mellitus ([place] [address] )     Dyspnea     GERD (gastroesophageal reflux disease)     Hyperlipidemia     Hypertension     MI (myocardial infarction) ([place] [address] )     with 3 stents    Nodule of apex of right lung     JACQUELINE (obstructive sleep apnea)     Prostate cancer [facility])        Past Surgical History:   Procedure Laterality Date    ABDOMINAL SURGERY      exploratory    ANGIOPLASTY      3 stents    APPENDECTOMY      COLONOSCOPY  2015    CT NEEDLE BIOPSY LUNG  11/3/2020    ESOPHAGOGASTRODUODENOSCOPY N/A 10/2/2017    Procedure: ESOPHAGOGASTRODUODENOSCOPY (EGD); Surgeon: Erich Fuller MD;  Location: [facility]; Service: Gastroenterology    IR THORACENTESIS  11/3/2020    KNEE CARTILAGE SURGERY      OTHER SURGICAL HISTORY      stent placement    PROSTATE SURGERY      SKIN GRAFT      Basal cell CA back       Family History   Problem Relation Age of Onset    Heart disease Father     Other Father         Mesothelioma      I have reviewed and agree with the history as documented      E-Cigarette/Vaping    E-Cigarette Use Never User      E-Cigarette/Vaping Substances    Nicotine No     THC No     CBD No     Flavoring No     Other No     Unknown No      Social History     Tobacco Use    Smoking status: Former Smoker     Packs/day: 1 50     Years: 50 00     Pack years: 75 00     Start date: 38     Quit date: 2020     Years since quittin 6    Smokeless tobacco: Never Used   Vaping Use    Vaping Use: Never used   Substance Use Topics    Alcohol use: Not Currently    Drug use: No        Review of Systems   Constitutional: Negative for chills and fever  HENT: Negative for congestion, rhinorrhea and sore throat  Respiratory: Positive for shortness of breath  Negative for cough  Cardiovascular: Positive for chest pain  Negative for palpitations and leg swelling  Gastrointestinal: Negative for abdominal pain, diarrhea, nausea and vomiting  Genitourinary: Negative for dysuria and hematuria  Musculoskeletal: Negative for back pain and neck pain  Neurological: Negative for weakness, light-headedness, numbness and headaches  All other systems reviewed and are negative  Physical Exam  ED Triage Vitals   Temperature Pulse Respirations Blood Pressure SpO2   22 0249 22 0252 22 0249 22 0249 22 0252   98 1 °F (36 7 °C) (!) 111 20 160/74 100 %      Temp Source Heart Rate Source Patient Position - Orthostatic VS BP Location FiO2 (%)   22 0249 22 0249 22 0249 22 0249 --   Oral Monitor Sitting Right arm       Pain Score       22 0404       6             Orthostatic Vital Signs  Vitals:    22 0718 22 1303 22 1737 22 2251   BP: 133/74 (!) 168/105 (!) 170/101 (!) 181/116   Pulse: 67 (!) 113 (!) 118 (!) 117   Patient Position - Orthostatic VS:           Physical Exam  Vitals and nursing note reviewed  Constitutional:       General: He is not in acute distress  Appearance: Normal appearance  He is normal weight  HENT:      Head: Normocephalic and atraumatic        Right Ear: External ear normal       Left Ear: External ear normal       Nose: Nose normal       Mouth/Throat:      Mouth: Mucous membranes are moist       Pharynx: Oropharynx is clear  No oropharyngeal exudate or posterior oropharyngeal erythema  Eyes:      Extraocular Movements: Extraocular movements intact  Conjunctiva/sclera: Conjunctivae normal       Pupils: Pupils are equal, round, and reactive to light  Cardiovascular:      Rate and Rhythm: Normal rate and regular rhythm  Pulses: Normal pulses  Heart sounds: Normal heart sounds  Pulmonary:      Comments: Decreased breath sounds bilaterally with poor air exchange  No wheezing or crackles on auscultation  Mild tachypnea without conversational dyspnea  On baseline 3L NC O2  Chest:      Chest wall: No tenderness  Abdominal:      General: Abdomen is flat  Bowel sounds are normal  There is no distension  Palpations: Abdomen is soft  Tenderness: There is no abdominal tenderness  There is no right CVA tenderness, left CVA tenderness or guarding  Musculoskeletal:         General: No swelling or tenderness  Normal range of motion  Cervical back: Normal range of motion and neck supple  No tenderness  Right lower leg: No tenderness  No edema  Left lower leg: No tenderness  No edema  Skin:     General: Skin is warm and dry  Neurological:      General: No focal deficit present  Mental Status: He is alert and oriented to person, place, and time           ED Medications  Medications   aspirin chewable tablet 324 mg (324 mg Oral Given 2/21/22 0318)   albuterol inhalation solution 10 mg (10 mg Nebulization Given 2/21/22 0319)     And   ipratropium (ATROVENT) 0 02 % inhalation solution 1 mg (1 mg Nebulization Given 2/21/22 0319)     And   sodium chloride 0 9 % inhalation solution 3 mL (3 mL Nebulization Given 2/21/22 0319)   morphine (PF) 10 mg/mL injection 6 mg (6 mg Intravenous Given 2/21/22 0404)   iohexol (OMNIPAQUE) 350 MG/ML injection (SINGLE-DOSE) 85 mL (85 mL Intravenous Given 2/21/22 0523)   predniSONE tablet 40 mg (40 mg Oral Given 2/23/22 0815)       Diagnostic Studies  Results Reviewed     Procedure Component Value Units Date/Time    Magnesium [[id number]]  (Normal) Collected: 02/22/22 0452    Lab Status: Final result Specimen: Blood from Arm, Right Updated: 02/22/22 0548     Magnesium 2 4 mg/dL     Comprehensive metabolic panel [[id number]]  (Abnormal) Collected: 02/22/22 0452    Lab Status: Final result Specimen: Blood from Arm, Right Updated: 02/22/22 0548     Sodium 132 mmol/L      Potassium 4 9 mmol/L      Chloride 101 mmol/L      CO2 25 mmol/L      ANION GAP 6 mmol/L      BUN 35 mg/dL      Creatinine 1 62 mg/dL      Glucose 169 mg/dL      Calcium 9 4 mg/dL      Corrected Calcium 10 4 mg/dL      AST 12 U/L      ALT 15 U/L      Alkaline Phosphatase 98 U/L      Total Protein 8 3 g/dL      Albumin 2 8 g/dL      Total Bilirubin 0 38 mg/dL      eGFR 41 ml/min/1 73sq m     Narrative:      [place] guidelines for Chronic Kidney Disease (CKD):     Stage 1 with normal or high GFR (GFR > 90 mL/min/1 73 square meters)    Stage 2 Mild CKD (GFR = 60-89 mL/min/1 73 square meters)    Stage 3A Moderate CKD (GFR = 45-59 mL/min/1 73 square meters)    Stage 3B Moderate CKD (GFR = 30-44 mL/min/1 73 square meters)    Stage 4 Severe CKD (GFR = 15-29 mL/min/1 73 square meters)    Stage 5 End Stage CKD (GFR <15 mL/min/1 73 square meters)  Note: GFR calculation is accurate only with a steady state creatinine    Phosphorus [[id number]]  (Normal) Collected: 02/22/22 0452    Lab Status: Final result Specimen: Blood from Arm, Right Updated: 02/22/22 0548     Phosphorus 3 5 mg/dL     CBC and differential [[id number]]  (Abnormal) Collected: 02/22/22 0452    Lab Status: Final result Specimen: Blood from Arm, Right Updated: 02/22/22 0516     WBC 14 67 Thousand/uL      RBC 3 97 Million/uL      Hemoglobin 10 2 g/dL      Hematocrit 32 7 % MCV 82 fL      MCH 25 7 pg      MCHC 31 2 g/dL      RDW 15 0 %      MPV 10 4 fL      Platelets 965 Thousands/uL      nRBC 0 /100 WBCs      Neutrophils Relative 88 %      Immat GRANS % 1 %      Lymphocytes Relative 6 %      Monocytes Relative 5 %      Eosinophils Relative 0 %      Basophils Relative 0 %      Neutrophils Absolute 12 96 Thousands/µL      Immature Grans Absolute 0 10 Thousand/uL      Lymphocytes Absolute 0 93 Thousands/µL      Monocytes Absolute 0 66 Thousand/µL      Eosinophils Absolute 0 00 Thousand/µL      Basophils Absolute 0 02 Thousands/µL     Procalcitonin Reflex [[id number]]  (Abnormal) Collected: 02/21/22 1325    Lab Status: Final result Specimen: Blood Updated: 02/21/22 1405     Procalcitonin 0 64 ng/ml     NT-BNP PRO [[id number]]  (Abnormal) Collected: 02/21/22 1130    Lab Status: Final result Specimen: Blood from Arm, Left Updated: 02/21/22 1238     NT-proBNP 1,960 pg/mL     Procalcitonin with AM Reflex [[id number]]  (Abnormal) Collected: 02/21/22 1130    Lab Status: Final result Specimen: Blood from Arm, Left Updated: 02/21/22 1209     Procalcitonin 0 26 ng/ml     Fingerstick Glucose (POCT) [[id number]]  (Abnormal) Collected: 02/21/22 1127    Lab Status: Final result Updated: 02/21/22 1133     POC Glucose 451 mg/dl     HS Troponin I 4hr [[id number]]  (Normal) Collected: 02/21/22 0745    Lab Status: Final result Specimen: Blood from Arm, Left Updated: 02/21/22 0829     hs TnI 4hr 15 ng/L      Delta 4hr hsTnI -1 ng/L     Fingerstick Glucose (POCT) [[id number]]  (Abnormal) Collected: 02/21/22 0744    Lab Status: Final result Updated: 02/21/22 0747     POC Glucose 416 mg/dl     Hemoglobin A1c w/EAG Estimation (Orders if not completed within the last 90 days) [[id number]]  (Abnormal) Collected: 02/21/22 0521    Lab Status: Final result Specimen: Blood from Arm, Left Updated: 02/21/22 0556     Hemoglobin A1C 8 8 %       mg/dl     HS Troponin I 2hr [[id number]]  (Normal) Collected: 02/21/22 7604 Lab Status: Final result Specimen: Blood from Arm, Left Updated: 02/21/22 0554     hs TnI 2hr 15 ng/L      Delta 2hr hsTnI -1 ng/L     HS Troponin 0hr (reflex protocol) [[id number]]  (Normal) Collected: 02/21/22 0322    Lab Status: Final result Specimen: Blood from Arm, Left Updated: 02/21/22 0452     hs TnI 0hr 16 ng/L     COVID/FLU/RSV - 2 hour TAT [[id number]]  (Normal) Collected: 02/21/22 0322    Lab Status: Final result Specimen: Nares from Nose Updated: 02/21/22 0431     SARS-CoV-2 Negative     INFLUENZA A PCR Negative     INFLUENZA B PCR Negative     RSV PCR Negative    Narrative:      FOR PEDIATRIC PATIENTS - copy/paste COVID Guidelines URL to browser: [email or web address]  [employer]    SARS-CoV-2 assay is a Nucleic Acid Amplification assay intended for the  qualitative detection of nucleic acid from SARS-CoV-2 in nasopharyngeal  swabs  Results are for the presumptive identification of SARS-CoV-2 RNA  Positive results are indicative of infection with SARS-CoV-2, the virus  causing COVID-19, but do not rule out bacterial infection or co-infection  with other viruses  Laboratories within the [place] and its  territories are required to report all positive results to the appropriate  public health authorities  Negative results do not preclude SARS-CoV-2  infection and should not be used as the sole basis for treatment or other  patient management decisions  Negative results must be combined with  clinical observations, patient history, and epidemiological information  This test has not been FDA cleared or approved  This test has been authorized by FDA under an Emergency Use Authorization  (EUA)   This test is only authorized for the duration of time the  declaration that circumstances exist justifying the authorization of the  emergency use of an in vitro diagnostic tests for detection of SARS-CoV-2  virus and/or diagnosis of COVID-19 infection under section 564(b)(1) of  the Act, 21 U  S C  810YXL-3(F)(8), unless the authorization is terminated  or revoked sooner  The test has been validated but independent review by FDA  and CLIA is pending  Test performed using [place] GeneXpert: This RT-PCR assay targets N2,  a region unique to SARS-CoV-2  A conserved region in the E-gene was chosen  for pan-Sarbecovirus detection which includes SARS-CoV-2      Comprehensive metabolic panel [[id number]]  (Abnormal) Collected: 02/21/22 0322    Lab Status: Final result Specimen: Blood from Arm, Left Updated: 02/21/22 0403     Sodium 132 mmol/L      Potassium 4 6 mmol/L      Chloride 100 mmol/L      CO2 27 mmol/L      ANION GAP 5 mmol/L      BUN 26 mg/dL      Creatinine 1 64 mg/dL      Glucose 373 mg/dL      Calcium 9 0 mg/dL      Corrected Calcium 9 9 mg/dL      AST 5 U/L      ALT 14 U/L      Alkaline Phosphatase 108 U/L      Total Protein 8 4 g/dL      Albumin 2 9 g/dL      Total Bilirubin 0 26 mg/dL      eGFR 40 ml/min/1 73sq m     Narrative:      [place] guidelines for Chronic Kidney Disease (CKD):     Stage 1 with normal or high GFR (GFR > 90 mL/min/1 73 square meters)    Stage 2 Mild CKD (GFR = 60-89 mL/min/1 73 square meters)    Stage 3A Moderate CKD (GFR = 45-59 mL/min/1 73 square meters)    Stage 3B Moderate CKD (GFR = 30-44 mL/min/1 73 square meters)    Stage 4 Severe CKD (GFR = 15-29 mL/min/1 73 square meters)    Stage 5 End Stage CKD (GFR <15 mL/min/1 73 square meters)  Note: GFR calculation is accurate only with a steady state creatinine    CBC and differential [[id number]]  (Abnormal) Collected: 02/21/22 0322    Lab Status: Final result Specimen: Blood from Arm, Left Updated: 02/21/22 0337     WBC 14 76 Thousand/uL      RBC 4 04 Million/uL      Hemoglobin 10 3 g/dL      Hematocrit 33 4 %      MCV 83 fL      MCH 25 5 pg      MCHC 30 8 g/dL      RDW 15 4 %      MPV 10 8 fL      Platelets 352 Thousands/uL      nRBC 0 /100 WBCs      Neutrophils Relative 83 %      Immat GRANS % 1 %      Lymphocytes Relative 7 %      Monocytes Relative 9 %      Eosinophils Relative 0 %      Basophils Relative 0 %      Neutrophils Absolute 12 23 Thousands/µL      Immature Grans Absolute 0 07 Thousand/uL      Lymphocytes Absolute 1 08 Thousands/µL      Monocytes Absolute 1 31 Thousand/µL      Eosinophils Absolute 0 04 Thousand/µL      Basophils Absolute 0 03 Thousands/µL                  CTA ED chest PE study   Final Result by Yamilex Jacob MD (02/21 2801)      No evidence of pulmonary embolus  Findings consistent with bronchiolitis secondary to infection or aspiration at the anterior left upper lobe/lingula  Stable right-sided volume loss with areas of rounded atelectasis  Workstation performed: [id number]         X-ray chest 1 view portable   Final Result by Eleazar Duenas MD (02/21 9420)      Stable chest x-ray with no new findings  Workstation performed: [id number]               Procedures  Procedures      ED Course  ED Course as of 03/02/22 1016   Mon Feb 21, 2022   0309 Procedure Note: EKG  Date/Time: 02/21/22 2:56 AM   Interpreted by: Kamaljit Brooke MD  Indications / Diagnosis:   ECG reviewed by me, the ED Physician: yes   The EKG demonstrates:  Sinus tachycardia 110 bpm   First degree AV block  Right bundle-branch block  Nonspecific ST-T wave changes  No STEMI  No significant change compared to ECG from 12/11/2021    0341 WBC(!): 14 76   0341 Hemoglobin(!): 10 3   0532 hs TnI 0hr: 16                             SBIRT 22yo+      Most Recent Value   SBIRT (25 yo +)    In order to provide better care to our patients, we are screening all of our patients for alcohol and drug use  Would it be okay to ask you these screening questions?  No Filed at: 02/21/2022 5304          Wells' Criteria for PE      Most Recent Value   Wells' Criteria for PE    Clinical signs and symptoms of DVT 0 Filed at: 02/21/2022 0425   PE is primary diagnosis or equally likely 3 Filed at: 02/21/2022 0425   HR >100 1 5 Filed at: 02/21/2022 0425   Immobilization at least 3 days or Surgery in the previous 4 weeks 0 Filed at: 02/21/2022 0425   Previous, objectively diagnosed PE or DVT 0 Filed at: 02/21/2022 0425   Hemoptysis 0 Filed at: 02/21/2022 0425   Malignancy with treatment within 6 months or palliative 0 Filed at: 02/21/2022 0425   Wells' Criteria Total 4 5 Filed at: 02/21/2022 0425            [facility]  Number of Diagnoses or Management Options  Chest pain  COPD exacerbation ([facility] [address] )  Dyspnea  History of CHF (congestive heart failure)  Diagnosis management comments: This is a 79 y/o male with hx of HTN, DM, CAD, CHF, and COPD presenting  for evaluation of pleuritic left chest pain and increased dyspnea this started yesterday morning (0600, approximately 21 hours ago)  The patient was recently seen and admitted for similar symptoms at this hospital   His symptoms are exacerbated with activity and he has a cough that is nonproductive  His chest pain is sharp, nonradiating, located in the left anterolateral chest wall  No other symptoms or complaints  On exam he has decreased breath sounds bilaterally with poor air exchange  No wheezing or crackles on auscultation  Mild tachypnea without conversational dyspnea  On baseline 3L NC O2  Patient with history of CHF as well as COPD, likely multifactorial causes for chest pain and dyspnea  Patient wells score is 4 5, will also evaluate for possible pulmonary embolism with CT PE study  PE scan negative  Discussed findings and indications for admission with the patient and he understands and agrees  [facility]        Disposition  Final diagnoses:   Chest pain   Dyspnea   COPD exacerbation ([facility] [address] )   History of CHF (congestive heart failure)     Time reflects when diagnosis was documented in both MDM as applicable and the Disposition within this note     Time User Action Codes Description Comment    2/21/2022  4:28 AM Lorelei Kirk S Add [C34 91] Adenocarcinoma of right lung ([place] [address] )     2/21/2022  4:28 AM Lorelei Kirk S Add [J44 1] COPD with acute exacerbation ([place] [address] )     2/21/2022  5:34 AM Candor Ted Add [R07 9] Chest pain     2/21/2022  5:34 AM Dana [place] Modify [C34 91] Adenocarcinoma of right lung ([place] [address] )     2/21/2022  5:34 AM Dana [place] Modify [R07 9] Chest pain     2/21/2022  5:34 AM Candor Ted Add [R06 00] Dyspnea     2/21/2022  5:34 AM Candor Ted Add [J44 1] COPD exacerbation ([place] [address] )     2/21/2022  5:35 AM Dana [place] Add [Z86 79] History of CHF (congestive heart failure)     2/22/2022  3:20 PM Zulay Sleigh Add [E11 9] Diabetes mellitus without complication ([place] [address] )     3/36/2972  3:22 PM Zulay Sleigh Add [M54 50,  G89 29] Chronic bilateral low back pain, unspecified whether sciatica present     2/22/2022  3:23 PM Zulay Sleigh Add [N18 31] Stage 3a chronic kidney disease ([place] [address] )     2/22/2022  3:24 PM Zulay Sleigh Modify [N18 31] Stage 3a chronic kidney disease ([place] [address] )     2/23/2022  9:44 AM Zulay Sleigh Add [R07 89] Left-sided chest wall pain     2/23/2022  9:44 AM Zulay Sleigh Add [J96 11] Chronic respiratory failure with hypoxia ([place] [address] )     2/23/2022  9:44 AM Zulay Sleigh Add [E11 65,  Z79 4] Type 2 diabetes mellitus with hyperglycemia, with long-term current use of insulin [facility])       ED Disposition     ED Disposition Condition Date/Time Comment    Admit Stable Mon Feb 21, 2022  5:34 AM Case was discussed with Dr Raul Yun, and the patient's admission status was agreed to be Admission Status: inpatient status to the service of [facility]          Follow-up Information     Follow up With Specialties Details Why [address] MD Family Medicine Follow up within 1 week, or please find a new family doctor and Wilfrid Caraballo an appointment as soon as they are able to get you in  [address]  [facility] [address]  [phone]            Discharge Medication List as of 2/23/2022  9:53 AM      START taking these medications    Details   amoxicillin-clavulanate (AUGMENTIN) 875-125 mg per tablet Take 1 tablet by mouth every 12 (twelve) hours for 3 doses, Starting Wed 2/23/2022, Until Fri 2/25/2022, Normal         CONTINUE these medications which have CHANGED    Details   cyclobenzaprine (FLEXERIL) 10 mg tablet Take 1 tablet (10 mg total) by mouth 3 (three) times a day for 10 days, Starting Wed 2/23/2022, Until Sat 3/5/2022, Normal      ferrous sulfate 325 (65 Fe) mg tablet Take 1 tablet (325 mg total) by mouth daily with breakfast, Starting Wed 2/23/2022, Normal      glucose blood (OneTouch Verio) test strip May substitute brand based on insurance coverage   Check glucose QID , Normal      oxyCODONE-acetaminophen (PERCOCET) 7 5-325 MG per tablet Take 1 tablet by mouth every 4 (four) hours as needed for moderate pain for up to 5 days Max Daily Amount: 6 tablets, Starting Wed 2/23/2022, Until Mon 2/28/2022 at 2359, Normal         CONTINUE these medications which have NOT CHANGED    Details   BD Insulin Syringe U-500 31G X 6MM 0 5 ML MISC USE 1 SYRINGE THREE TIMES A DAY FOR INJECTING INSULIN, Normal      fluticasone-vilanterol (BREO ELLIPTA) 100-25 mcg/inh inhaler Inhale 1 puff daily Rinse mouth after use , Starting Mon 1/31/2022, Normal      furosemide (LASIX) 40 mg tablet Take 1 tablet (40 mg total) by mouth daily, Starting Sun 1/30/2022, Normal      HUMULIN R 500 UNIT/ML CONCENTRATED injection INJECT 45 UNITS TWICE DAILY BEFORE BREAKFAST AND LUNCH, Normal      metoprolol tartrate (LOPRESSOR) 25 mg tablet TAKE ONE TABLET BY MOUTH TWICE A DAY, Normal      potassium chloride (K-DUR,KLOR-CON) 20 mEq tablet Take 1 tablet (20 mEq total) by mouth daily, Starting Sun 1/30/2022, Normal      simvastatin (ZOCOR) 40 mg tablet TAKE ONE TABLET BY MOUTH EVERY DAY, Normal      Trelegy Ellipta 100-62 5-25 MCG/INH inhaler INHALE 1 PUFF BY MOUTH DAILY *RINSE MOUTH AFTER USE*, Normal           Outpatient Discharge Orders   Discharge Diet     Activity as tolerated     Call provider for:  persistent nausea or vomiting     Call provider for:  severe uncontrolled pain       PDMP Review       Value Time User    PDMP Reviewed  Yes 1/21/2022  3:32 AM Brad Mendes DO           ED Provider  Attending physically available and evaluated Barb Raymundo I managed the patient along with the ED Attending      Electronically Signed by         Krupa Roa MD  03/02/22 1016

## 2022-02-21 NOTE — ASSESSMENT & PLAN NOTE
· Patient is noted to have worse hypoxia with current use of 3 L O2 (is on home O2 3 L) noted saturation from usual 96 to 97 currently at 92  · Started patient on prednisone 40 mg daily  · Continue Xopenex inhalation as needed  · On Brio Ellipta 100/251 puff daily  · Also continue Atrovent t i d    · Pulmonary consult will be appreciated

## 2022-02-21 NOTE — RESPIRATORY THERAPY NOTE
RT Protocol Note  Shaheen Gill  78 y o  male MRN: [id number]  Unit/Bed#: ED 17 Encounter: [id number]    Assessment    Principal Problem:    Left-sided chest wall pain  Active Problems:    HLD (hyperlipidemia)    Type 2 diabetes mellitus with hyperglycemia, with long-term current use of insulin (HCC)    Pleural effusion on right    Essential hypertension    Oxygen dependent    COPD with acute exacerbation (HCC)    Adenocarcinoma of lung (HCC)    CKD (chronic kidney disease) stage 3, GFR 30-59 ml/min ([facility])      Home Pulmonary Medications:  breo   Home Devices/Therapy: Home O2    Past Medical History:   Diagnosis Date    Abdominal pain     MARANDA (acute kidney injury) ([place] [address] ) 2018    Cardiac disease     CHF [facility]congestive heart failure) ([facility])     COPD, severe (HCC)     Coronary artery disease     DDD (degenerative disc disease), lumbar 2020    Diabetes mellitus ([place] [address] )     Dyspnea     GERD (gastroesophageal reflux disease)     Hyperlipidemia     Hypertension     MI (myocardial infarction) ([place] [address] )     with 3 stents    Nodule of apex of right lung     JACQUELINE (obstructive sleep apnea)     Prostate cancer ([place] [address] )      Social History     Socioeconomic History    Marital status: /Civil Union     Spouse name: None    Number of children: 1    Years of education: 6    Highest education level: None   Occupational History    None   Tobacco Use    Smoking status: Former Smoker     Packs/day: 1 50     Years: 50 00     Pack years: 75 00     Start date: 36     Quit date: 2020     Years since quittin 6    Smokeless tobacco: Never Used   Vaping Use    Vaping Use: Never used   Substance and Sexual Activity    Alcohol use: Not Currently    Drug use: No    Sexual activity: Never     Partners: Female   Other Topics Concern    None   Social History Narrative    Most recent tobacco use screenin2018    Do you currently or have you served in [employer]: No    Were you activated, into active duty, as a member of the [employer] or as a Reservist: No    Caffeine intake: Moderate    Alcohol intake: None    Marital status:     Number of children: 1    Education: 8    Occupation: retired    Sexual orientation: Heterosexual    General stress level: Medium    Live alone or with others: with others    Exercise level: None    Sexually active: No    Overweight: Yes    Obese: Yes    Guns present in home: No    Seat belts used routinely: Yes    Advance directive: No    Sunscreen used routinely: No    Smoke alarm in home: Yes    Legally blind in one or both eyes: No    Hard of hearing or deaf in one or both ears: No     Social Determinants of Health     Financial Resource Strain: Not on file   Food Insecurity: No Food Insecurity    Worried About Running Out of Food in the Last Year: Never true    Kenyatta of Food in the Last Year: Never true   Transportation Needs: No Transportation Needs    Lack of Transportation (Medical): No    Lack of Transportation (Non-Medical): No   Physical Activity: Not on file   Stress: Not on file   Social Connections: Not on file   Intimate Partner Violence: Not on file   Housing Stability: Unknown    Unable to Pay for Housing in the Last Year: No    Number of Places Lived in the Last Year: Not on file    Unstable Housing in the Last Year: No       Subjective         Objective    Physical Exam:   Assessment Type: Assess only    Vitals:  Blood pressure 124/81, pulse 104, temperature 98 1 °F (36 7 °C), temperature source Oral, resp  rate 20, weight 107 kg (236 lb), SpO2 99 %  Imaging and other studies: I have personally reviewed pertinent reports  Plan  To start pt on a mild distress pathway      Respiratory Plan: Mild Distress pathway

## 2022-02-21 NOTE — ASSESSMENT & PLAN NOTE
Patient came in with new onset left chest wall pain that is almost 24 hours old and is worsening throughout the night he cannot sleep  Noted the patient also has worsening hypoxia from 3 L O2 with saturation of 96 to 97% currently he is 92  Could be pleurisy in relation to COPD exacerbation  Patient is also due for CT scan of the chest for follow-up adeno carcinoma of the right lung (see note of January 30, 2022 discharge summary); patient has not had such study done  Will order current admission  Emergency room ordered for CTA chest PE protocol  Pulmonary consult  Pain control:  Geriatric pain protocol  Will also continue with oxygen supplementation:  3 L nasal cannula O2

## 2022-02-21 NOTE — ASSESSMENT & PLAN NOTE
Lab Results   Component Value Date    EGFR 40 02/21/2022    EGFR 50 01/30/2022    EGFR 46 01/29/2022    CREATININE 1 64 (H) 02/21/2022    CREATININE 1 37 (H) 01/30/2022    CREATININE 1 46 (H) 01/29/2022   Current creatinine is 1 64 with GFR 40; will continue follow up creatinine levels

## 2022-02-22 LAB
ALBUMIN SERPL BCP-MCNC: 2.8 G/DL (ref 3.5–5)
ALP SERPL-CCNC: 98 U/L (ref 46–116)
ALT SERPL W P-5'-P-CCNC: 15 U/L (ref 12–78)
ANION GAP SERPL CALCULATED.3IONS-SCNC: 6 MMOL/L (ref 4–13)
AST SERPL W P-5'-P-CCNC: 12 U/L (ref 5–45)
BASOPHILS # BLD AUTO: 0.02 THOUSANDS/ΜL (ref 0–0.1)
BASOPHILS NFR BLD AUTO: 0 % (ref 0–1)
BILIRUB SERPL-MCNC: 0.38 MG/DL (ref 0.2–1)
BUN SERPL-MCNC: 35 MG/DL (ref 5–25)
CALCIUM ALBUM COR SERPL-MCNC: 10.4 MG/DL (ref 8.3–10.1)
CALCIUM SERPL-MCNC: 9.4 MG/DL (ref 8.3–10.1)
CHLORIDE SERPL-SCNC: 101 MMOL/L (ref 100–108)
CO2 SERPL-SCNC: 25 MMOL/L (ref 21–32)
CREAT SERPL-MCNC: 1.62 MG/DL (ref 0.6–1.3)
EOSINOPHIL # BLD AUTO: 0 THOUSAND/ΜL (ref 0–0.61)
EOSINOPHIL NFR BLD AUTO: 0 % (ref 0–6)
ERYTHROCYTE [DISTWIDTH] IN BLOOD BY AUTOMATED COUNT: 15 % (ref 11.6–15.1)
GFR SERPL CREATININE-BSD FRML MDRD: 41 ML/MIN/1.73SQ M
GLUCOSE SERPL-MCNC: 169 MG/DL (ref 65–140)
GLUCOSE SERPL-MCNC: 207 MG/DL (ref 65–140)
GLUCOSE SERPL-MCNC: 223 MG/DL (ref 65–140)
GLUCOSE SERPL-MCNC: 357 MG/DL (ref 65–140)
GLUCOSE SERPL-MCNC: 400 MG/DL (ref 65–140)
HCT VFR BLD AUTO: 32.7 % (ref 36.5–49.3)
HGB BLD-MCNC: 10.2 G/DL (ref 12–17)
IMM GRANULOCYTES # BLD AUTO: 0.1 THOUSAND/UL (ref 0–0.2)
IMM GRANULOCYTES NFR BLD AUTO: 1 % (ref 0–2)
LYMPHOCYTES # BLD AUTO: 0.93 THOUSANDS/ΜL (ref 0.6–4.47)
LYMPHOCYTES NFR BLD AUTO: 6 % (ref 14–44)
MAGNESIUM SERPL-MCNC: 2.4 MG/DL (ref 1.6–2.6)
MCH RBC QN AUTO: 25.7 PG (ref 26.8–34.3)
MCHC RBC AUTO-ENTMCNC: 31.2 G/DL (ref 31.4–37.4)
MCV RBC AUTO: 82 FL (ref 82–98)
MONOCYTES # BLD AUTO: 0.66 THOUSAND/ΜL (ref 0.17–1.22)
MONOCYTES NFR BLD AUTO: 5 % (ref 4–12)
NEUTROPHILS # BLD AUTO: 12.96 THOUSANDS/ΜL (ref 1.85–7.62)
NEUTS SEG NFR BLD AUTO: 88 % (ref 43–75)
NRBC BLD AUTO-RTO: 0 /100 WBCS
PHOSPHATE SERPL-MCNC: 3.5 MG/DL (ref 2.3–4.1)
PLATELET # BLD AUTO: 223 THOUSANDS/UL (ref 149–390)
PMV BLD AUTO: 10.4 FL (ref 8.9–12.7)
POTASSIUM SERPL-SCNC: 4.9 MMOL/L (ref 3.5–5.3)
PROT SERPL-MCNC: 8.3 G/DL (ref 6.4–8.2)
RBC # BLD AUTO: 3.97 MILLION/UL (ref 3.88–5.62)
SODIUM SERPL-SCNC: 132 MMOL/L (ref 136–145)
WBC # BLD AUTO: 14.67 THOUSAND/UL (ref 4.31–10.16)

## 2022-02-22 PROCEDURE — 84100 ASSAY OF PHOSPHORUS: CPT | Performed by: INTERNAL MEDICINE

## 2022-02-22 PROCEDURE — 94760 N-INVAS EAR/PLS OXIMETRY 1: CPT | Performed by: SOCIAL WORKER

## 2022-02-22 PROCEDURE — 82948 REAGENT STRIP/BLOOD GLUCOSE: CPT

## 2022-02-22 PROCEDURE — 83735 ASSAY OF MAGNESIUM: CPT | Performed by: INTERNAL MEDICINE

## 2022-02-22 PROCEDURE — 99233 SBSQ HOSP IP/OBS HIGH 50: CPT | Performed by: INTERNAL MEDICINE

## 2022-02-22 PROCEDURE — 99232 SBSQ HOSP IP/OBS MODERATE 35: CPT | Performed by: NURSE PRACTITIONER

## 2022-02-22 PROCEDURE — 85025 COMPLETE CBC W/AUTO DIFF WBC: CPT | Performed by: INTERNAL MEDICINE

## 2022-02-22 PROCEDURE — 80053 COMPREHEN METABOLIC PANEL: CPT | Performed by: INTERNAL MEDICINE

## 2022-02-22 RX ADMIN — OXYCODONE HYDROCHLORIDE 5 MG: 5 TABLET ORAL at 12:35

## 2022-02-22 RX ADMIN — FUROSEMIDE 40 MG: 40 TABLET ORAL at 08:05

## 2022-02-22 RX ADMIN — HYDROMORPHONE HYDROCHLORIDE 0.5 MG: 1 INJECTION, SOLUTION INTRAMUSCULAR; INTRAVENOUS; SUBCUTANEOUS at 08:54

## 2022-02-22 RX ADMIN — OXYCODONE HYDROCHLORIDE 5 MG: 5 TABLET ORAL at 08:04

## 2022-02-22 RX ADMIN — INSULIN LISPRO 1 UNITS: 100 INJECTION, SOLUTION INTRAVENOUS; SUBCUTANEOUS at 21:56

## 2022-02-22 RX ADMIN — METOPROLOL TARTRATE 25 MG: 25 TABLET, FILM COATED ORAL at 17:28

## 2022-02-22 RX ADMIN — FERROUS SULFATE TAB 325 MG (65 MG ELEMENTAL FE) 325 MG: 325 (65 FE) TAB at 08:04

## 2022-02-22 RX ADMIN — POTASSIUM CHLORIDE 20 MEQ: 1500 TABLET, EXTENDED RELEASE ORAL at 08:04

## 2022-02-22 RX ADMIN — PREDNISONE 40 MG: 20 TABLET ORAL at 08:06

## 2022-02-22 RX ADMIN — PRAVASTATIN SODIUM 80 MG: 20 TABLET ORAL at 17:28

## 2022-02-22 RX ADMIN — OXYCODONE HYDROCHLORIDE 5 MG: 5 TABLET ORAL at 17:28

## 2022-02-22 RX ADMIN — AMOXICILLIN AND CLAVULANATE POTASSIUM 1 TABLET: 875; 125 TABLET, FILM COATED ORAL at 21:56

## 2022-02-22 RX ADMIN — TIOTROPIUM BROMIDE 18 MCG: 18 CAPSULE ORAL; RESPIRATORY (INHALATION) at 08:06

## 2022-02-22 RX ADMIN — CYCLOBENZAPRINE HYDROCHLORIDE 10 MG: 10 TABLET, FILM COATED ORAL at 08:04

## 2022-02-22 RX ADMIN — INSULIN LISPRO 2 UNITS: 100 INJECTION, SOLUTION INTRAVENOUS; SUBCUTANEOUS at 08:04

## 2022-02-22 RX ADMIN — ENOXAPARIN SODIUM 40 MG: 40 INJECTION SUBCUTANEOUS at 08:04

## 2022-02-22 RX ADMIN — INSULIN HUMAN 45 UNITS: 500 INJECTION, SOLUTION SUBCUTANEOUS at 17:22

## 2022-02-22 RX ADMIN — METOPROLOL TARTRATE 25 MG: 25 TABLET, FILM COATED ORAL at 08:06

## 2022-02-22 RX ADMIN — GUAIFENESIN 600 MG: 600 TABLET, EXTENDED RELEASE ORAL at 17:28

## 2022-02-22 RX ADMIN — INSULIN LISPRO 6 UNITS: 100 INJECTION, SOLUTION INTRAVENOUS; SUBCUTANEOUS at 17:22

## 2022-02-22 RX ADMIN — AMOXICILLIN AND CLAVULANATE POTASSIUM 1 TABLET: 875; 125 TABLET, FILM COATED ORAL at 08:04

## 2022-02-22 RX ADMIN — GUAIFENESIN 600 MG: 600 TABLET, EXTENDED RELEASE ORAL at 08:04

## 2022-02-22 RX ADMIN — FLUTICASONE FUROATE AND VILANTEROL TRIFENATATE 1 PUFF: 100; 25 POWDER RESPIRATORY (INHALATION) at 08:05

## 2022-02-22 RX ADMIN — INSULIN HUMAN 45 UNITS: 500 INJECTION, SOLUTION SUBCUTANEOUS at 08:53

## 2022-02-22 RX ADMIN — INSULIN LISPRO 6 UNITS: 100 INJECTION, SOLUTION INTRAVENOUS; SUBCUTANEOUS at 12:35

## 2022-02-22 NOTE — UTILIZATION REVIEW
Initial Clinical Review    Admission: Date/Time/Statement:   Admission Orders (From admission, onward)     Ordered        02/21/22 0432  Inpatient Admission  Once                      Orders Placed This Encounter   Procedures    Inpatient Admission     Standing Status:   Standing     Number of Occurrences:   1     Order Specific Question:   Level of Care     Answer:   Med Surg [16]     Order Specific Question:   Estimated length of stay     Answer:   More than 2 Midnights     Order Specific Question:   Certification     Answer:   I certify that inpatient services are medically necessary for this patient for a duration of greater than two midnights  See H&P and MD Progress Notes for additional information about the patient's course of treatment  ED Arrival Information     Expected Arrival Acuity    - 2/21/2022 02:40 Emergent         Means of arrival Escorted by Service Admission type    Wheelchair Family Member Hospitalist Emergency         Arrival complaint    chest pain,sob        Chief Complaint   Patient presents with    Chest Pain     Pt c/o severe left sided cp and sob since this morning  Pt states he has a hx of copd and was recently [facility]       Initial Presentation: 78 y o  male from home presented to the ED with left-sided chest wall pain x 1 day  PMH of right upper lobe adenocarcinoma s/p radiation, chronic hypoxic respiratory failure, CHF pEF, CKD stage 3, severe COPD, diabetes on insulin, obesity, nicotine dependence  Pt describes sharp left-sided chest pain that starts above his nipple radiates around his nipple and towards his back  Pain is worse with inspiration  Pain is reproducible with palpation under breast and towards side  In ED, tachycardic with coarse breath sounds present  Currently on 3L O2 NC, baseline home O2, hypoxic at O2 sat 92% usually 96-97%    CTA chest showed decreased volume in the right lung, tree-in-bud nodularity in the lingula, atelectasis right middle lobe and right lower lobe   mg x 1, IV Morphine, Albuterol and atrovent inhalation given  Plan: Inpatient admission for evaluation and treatment of left sided chest wall pain, COPD with acute exacerbation: Pulmonary consulted, pain control, cont O2 supplement, start Prednisone 40 mg daily, cont Xopenex inhalation prn, atrovent tid    02/21 Pulmonology Consult: Chronic hypoxic respiratory failure,Left sided pleuritic chest pain suspect costochondritis/pleurisy versus pneumonia/pneumonitis  currently saturating over 95% on 3 L nasal cannula and does not appear to be in any discomfort or distress  continue supplemental oxygen and titrate as tolerated; goal SpO2 > 88%  discontinue steroids; not in COPD exacerbation  DuoNeb p r n  continue Breo and add Spiriva, start p o  Augmentin 875 mg b i d  X5 days, diuretics per primary, OOB as tolerated, IS  Date: 02/22   Day 2:   IM Notes: Pt reports pain is better today  He is having a cough but is not able to move any mucus  Currently on baseline 3L O2 NC  Continue Xopenex s needed, Brio Ellipta 100/251 puff daily,  Atrovent t i d  Cont pain control  Prednisone 40 mg daily po  Cont to trend creatinine, UO  Pulmonology Notes: No acute events overnight  continue supplemental oxygen and titrate as tolerated; goal SpO2 > 88%  Continue steroids for 2 more days; seems to be helping his pain  Not for COPD exacerbation  DuoNeb p r n  continue p o  Augmentin 875 mg b i d  X5 days  continue Lidoderm patch  PE: Diminished breath sounds at lung bases  Reproducible tenderness to palpation of left inferior rib cage    Improved since yesterday    ED Triage Vitals   Temperature Pulse Respirations Blood Pressure SpO2   02/21/22 0249 02/21/22 0252 02/21/22 0249 02/21/22 0249 02/21/22 0252   98 1 °F (36 7 °C) (!) 111 20 160/74 100 %      Temp Source Heart Rate Source Patient Position - Orthostatic VS BP Location FiO2 (%)   02/21/22 0249 02/21/22 0249 02/21/22 0249 02/21/22 0249 --   Oral Monitor Sitting Right arm       Pain Score       02/21/22 0404       6          Wt Readings from Last 1 Encounters:   02/22/22 100 kg (220 lb 10 9 oz)     Additional Vital Signs:   Date/Time Temp Pulse Resp BP MAP (mmHg) SpO2 Calculated FIO2 (%) - Nasal Cannula Nasal Cannula O2 Flow Rate (L/min) O2 Device Patient Position - Orthostatic VS   02/22/22 15:03:34 97 4 °F (36 3 °C) Abnormal  80 -- 136/63 87 98 % -- -- -- --   02/22/22 15:03:27 97 4 °F (36 3 °C) Abnormal  -- -- 136/63 87 -- -- -- -- --       Date/Time Temp Pulse Resp BP MAP (mmHg) SpO2 Calculated FIO2 (%) - Nasal Cannula Nasal Cannula O2 Flow Rate (L/min) O2 Device Patient Position - Orthostatic VS   02/22/22 0744 -- -- -- -- -- 100 % 32 3 L/min Nasal cannula --   02/22/22 07:09:22 97 6 °F (36 4 °C) 71 -- 131/63 86 98 % -- -- -- Lying   02/21/22 22:36:14 97 6 °F (36 4 °C) -- 18 131/65 87 -- -- -- -- --   02/21/22 2200 -- -- -- -- -- -- 32 3 L/min Nasal cannula --   02/21/22 1535 -- -- -- -- -- 99 % 32 3 L/min Nasal cannula --   02/21/22 1500 98 °F (36 7 °C) 72 20 142/70 -- -- -- -- -- Lying   02/21/22 1300 -- 70 20 150/65 93 97 % 32 3 L/min Nasal cannula Lying   02/21/22 1100 -- 70 20 141/64 92 100 % 32 3 L/min Nasal cannula Lying   02/21/22 1000 -- 72 20 140/65 94 100 % 32 3 L/min Nasal cannula Lying   02/21/22 0930 -- -- -- -- -- 100 % -- -- -- --   02/21/22 0839 -- 101 -- 135/63 -- -- -- -- -- --   02/21/22 0800 -- 88 20 135/63 91 99 % 32 3 L/min Nasal cannula Lying   02/21/22 0700 -- 86 20 137/65 94 100 % 32 3 L/min Nasal cannula Lying   02/21/22 0555 -- 104 20 124/81 96 99 % 32 3 L/min None (Room air) Sitting   02/21/22 0430 -- 108 Abnormal  20 126/64 89 98 % 32 3 L/min Nasal cannula Sitting   02/21/22 0408 -- 106 Abnormal  20 114/55 75 97 % 32 3 L/min Nasal cannula Lying   02/21/22 0310 -- -- -- -- -- -- -- -- Nasal cannula --   02/21/22 0252 -- 111 Abnormal  -- -- -- 100 % -- -- -- --     Pertinent Labs/Diagnostic Test Results:   02/21  CXR: Stable chest x-ray with no new findings  CTA ED chest PE study: No evidence of pulmonary embolus      Findings consistent with bronchiolitis secondary to infection or aspiration at the anterior left upper lobe/lingula      Stable right-sided volume loss with areas of rounded atelectasis      EKG result: Sinus tachycardia with 1st degree A-V block with Fusion complexes  Right bundle branch block  Results from last 7 days   Lab Units 02/21/22  0322   SARS-COV-2  Negative     Results from last 7 days   Lab Units 02/22/22  0452 02/21/22  0322   WBC Thousand/uL 14 67* 14 76*   HEMOGLOBIN g/dL 10 2* 10 3*   HEMATOCRIT % 32 7* 33 4*   PLATELETS Thousands/uL 223 220   NEUTROS ABS Thousands/µL 12 96* 12 23*         Results from last 7 days   Lab Units 02/22/22  0452 02/21/22  0322   SODIUM mmol/L 132* 132*   POTASSIUM mmol/L 4 9 4 6   CHLORIDE mmol/L 101 100   CO2 mmol/L 25 27   ANION GAP mmol/L 6 5   BUN mg/dL 35* 26*   CREATININE mg/dL 1 62* 1 64*   EGFR ml/min/1 73sq m 41 40   CALCIUM mg/dL 9 4 9 0   MAGNESIUM mg/dL 2 4  --    PHOSPHORUS mg/dL 3 5  --      Results from last 7 days   Lab Units 02/22/22  0452 02/21/22  0322   AST U/L 12 5   ALT U/L 15 14   ALK PHOS U/L 98 108   TOTAL PROTEIN g/dL 8 3* 8 4*   ALBUMIN g/dL 2 8* 2 9*   TOTAL BILIRUBIN mg/dL 0 38 0 26     Results from last 7 days   Lab Units 02/22/22  0617 02/21/22  2051 02/21/22  1658 02/21/22  1127 02/21/22  0744   POC GLUCOSE mg/dl 223* 354* 397* 451* 416*     Results from last 7 days   Lab Units 02/22/22  0452 02/21/22  0322   GLUCOSE RANDOM mg/dL 169* 373*         Results from last 7 days   Lab Units 02/21/22  0521   HEMOGLOBIN A1C % 8 8*   EAG mg/dl 206     BETA-HYDROXYBUTYRATE   Date Value Ref Range Status   06/15/2019 0 2 <0 6 mmol/L Final      Results from last 7 days   Lab Units 02/21/22  0745 02/21/22  0521 02/21/22  0322   HS TNI 0HR ng/L  --   --  16   HS TNI 2HR ng/L  --  15  --    HSTNI D2 ng/L  --  -1  --    HS TNI 4HR ng/L 15  --   --    HSTNI D4 ng/L -1  --   --                  Results from last 7 days   Lab Units 02/21/22  1325 02/21/22  1130   PROCALCITONIN ng/ml 0 64* 0 26*                 Results from last 7 days   Lab Units 02/21/22  1130   NT-PRO BNP pg/mL 1,960*       Results from last 7 days   Lab Units 02/21/22  0322   INFLUENZA A PCR  Negative   INFLUENZA B PCR  Negative   RSV PCR  Negative     ED Treatment:   Medication Administration from 02/21/2022 0235 to 02/21/2022 1525       Date/Time Order Dose Route Action     02/21/2022 0318 aspirin chewable tablet 324 mg 324 mg Oral Given     02/21/2022 0319 albuterol inhalation solution 10 mg 10 mg Nebulization Given     02/21/2022 0319 ipratropium (ATROVENT) 0 02 % inhalation solution 1 mg 1 mg Nebulization Given     02/21/2022 0319 sodium chloride 0 9 % inhalation solution 3 mL 3 mL Nebulization Given     02/21/2022 0404 morphine (PF) 10 mg/mL injection 6 mg 6 mg Intravenous Given     02/21/2022 0840 cyclobenzaprine (FLEXERIL) tablet 10 mg 10 mg Oral Given     02/21/2022 0746 ferrous sulfate tablet 325 mg 325 mg Oral Given     02/21/2022 1030 fluticasone-vilanterol (BREO ELLIPTA) 100-25 mcg/inh inhaler 1 puff 1 puff Inhalation Not Given     02/21/2022 0839 furosemide (LASIX) tablet 40 mg 40 mg Oral Given     02/21/2022 0838 insulin regular (HumuLIN R U-500) 500 units/mL CONCENTRATED injection 45 Units 45 Units Subcutaneous Given     02/21/2022 0839 metoprolol tartrate (LOPRESSOR) tablet 25 mg 25 mg Oral Given     02/21/2022 0839 potassium chloride (K-DUR,KLOR-CON) CR tablet 20 mEq 20 mEq Oral Given     02/21/2022 0840 guaiFENesin (MUCINEX) 12 hr tablet 600 mg 600 mg Oral Given     02/21/2022 0930 ipratropium (ATROVENT) 0 02 % inhalation solution 0 5 mg 0 5 mg Nebulization Given     02/21/2022 0454 predniSONE tablet 40 mg 40 mg Oral Given     02/21/2022 0840 enoxaparin (LOVENOX) subcutaneous injection 40 mg 40 mg Subcutaneous Given     02/21/2022 1224 insulin lispro (HumaLOG) 100 units/mL subcutaneous injection 1-6 Units 6 Units Subcutaneous Given     02/21/2022 0835 insulin lispro (HumaLOG) 100 units/mL subcutaneous injection 1-6 Units 6 Units Subcutaneous Given     02/21/2022 1137 oxyCODONE (ROXICODONE) IR tablet 5 mg 5 mg Oral Given     02/21/2022 0523 iohexol (OMNIPAQUE) 350 MG/ML injection (SINGLE-DOSE) 85 mL 85 mL Intravenous Given     02/21/2022 1130 lidocaine (LIDODERM) 5 % patch 1 patch 1 patch Topical Not Given     02/21/2022 1312 tiotropium (SPIRIVA) capsule for inhaler 18 mcg 18 mcg Inhalation Given     02/21/2022 1136 amoxicillin-clavulanate (AUGMENTIN) 875-125 mg per tablet 1 tablet 1 tablet Oral Given     02/21/2022 1312 predniSONE tablet 40 mg 40 mg Oral Given        Past Medical History:   Diagnosis Date    Abdominal pain     [facility]ANDA [facility] injury) ([facility] ) 6/19/2018    Cardiac disease     [facility]     COPD, severe ([facility] )     Coronary artery disease     DDD (degenerative disc disease), lumbar 9/1/2020    Diabetes mellitus ([facility] )     Dyspnea     GERD (gastroesophageal reflux disease)     Hyperlipidemia     Hypertension     MI (myocardial infarction) ([facility] )     with 3 stents    Nodule of apex of right lung     JACQUELINE (obstructive sleep apnea)     Prostate cancer ([facility] )      Present on Admission:   Pleural effusion on right   [facility]   COPD with acute exacerbation ([facility]   CKD (chronic kidney disease) stage 3, GFR 30-59 ml/min ([facility])   Essential hypertension   HLD (hyperlipidemia)      Admitting Diagnosis: Chest pain [R07 9]  Dyspnea [R06 00]  COPD exacerbation (HCC) [J44 1]  History of CHF (congestive heart failure) [Z86 79]  COPD with acute exacerbation (HCC) [J44 1]  Adenocarcinoma of right lung (HCC) [C34 91]  Age/Sex: 78 y o  male  Admission Orders:  SCD  Daily weights  I/O  NC O2- Maintain O2 sat at >92%    Scheduled Medications:  amoxicillin-clavulanate, 1 tablet, Oral, Q12H [facility]  cyclobenzaprine, 10 mg, Oral, TID  enoxaparin, 40 mg, Subcutaneous, Daily  ferrous sulfate, 325 mg, Oral, Daily With Breakfast  fluticasone-vilanterol, 1 puff, Inhalation, Daily  furosemide, 40 mg, Oral, Daily  guaiFENesin, 600 mg, Oral, BID  insulin lispro, 1-5 Units, Subcutaneous, HS  insulin lispro, 1-6 Units, Subcutaneous, TID AC  insulin regular, 45 Units, Subcutaneous, BID AC  lidocaine, 1 patch, Topical, Daily  metoprolol tartrate, 25 mg, Oral, BID  potassium chloride, 20 mEq, Oral, Daily  pravastatin, 80 mg, Oral, Daily With Dinner  predniSONE, 40 mg, Oral, Daily  tiotropium, 18 mcg, Inhalation, Daily      Continuous IV Infusions: none     PRN Meds:  acetaminophen, 650 mg, Oral, Q6H PRN  aluminum-magnesium hydroxide-simethicone, 30 mL, Oral, Q6H PRN  docusate sodium, 100 mg, Oral, BID PRN  HYDROmorphone, 0 5 mg, Intravenous, Q4H PRN 02/22 x 1  ipratropium-albuterol, 3 mL, Nebulization, Q6H PRN  ondansetron, 4 mg, Intravenous, Q6H PRN  oxyCODONE, 2 5 mg, Oral, Q4H PRN  oxyCODONE, 5 mg, Oral, Q4H PRN 02/21 x 1, 02/22 x 2  sodium chloride (PF), 3 mL, Intravenous, Q1H PRN        IP CONSULT TO PULMONOLOGY  IP CONSULT TO CASE MANAGEMENT    Network Utilization Review Department  ATTENTION: Please call with any questions or concerns to [phone] and carefully listen to the prompts so that you are directed to the right person  All voicemails are confidential   Ann-Mariecarolina Ray all requests for admission clinical reviews, approved or denied determinations and any other requests to dedicated fax number below belonging to the campus where the patient is receiving treatment   List of dedicated fax numbers for the Facilities:  [address] DENIALS (Administrative/Medical Necessity) [phone]   [address] (Maternity/NICU/Pediatrics) [address] [facility],[address] [place] [address] [phone] 8049 [facility] [phone]     [address] [place] [address] [address] [place] [address] [address] [place], [place] [address] [phone]

## 2022-02-22 NOTE — ASSESSMENT & PLAN NOTE
Lab Results   Component Value Date    HGBA1C 8 8 (H) 02/21/2022     On insulin regular  45 units twice daily with insulin sliding scale      Blood Sugar Average: Last 72 hrs:  (P) 368 2

## 2022-02-22 NOTE — PROGRESS NOTES
PULMONOLOGY PROGRESS NOTE     Name: Roopa Khan    Age & Sex: 78 y o  male   MRN: [id number]  Unit/Bed#: -01   Encounter: [id number]    PATIENT INFORMATION     Name: Roopa Khan    Age & Sex: 78 y o  male   MRN: [id number]  Hospital Stay Days: 1    ASSESSMENT/PLAN     Principal Problem:    Left-sided chest wall pain  Active Problems:    HLD (hyperlipidemia)    Type 2 diabetes mellitus with hyperglycemia, with long-term current use of insulin (HCC)    Pleural effusion on right    Essential hypertension    Oxygen dependent    COPD with acute exacerbation (HCC)    Adenocarcinoma of lung (HCC)    CKD (chronic kidney disease) stage 3, GFR 30-59 ml/min ([facility])    Assessment:      1  Left-sided pleuritic chest pain  -suspect costochondritis/pleurisy versus pneumonia/pneumonitis  -chest CT with consolidation in the apical region of left upper lobe/lingula, CBC elevated prior to steroids, patient tachycardic  -pain reproducible with palpation  -procalcitonin pending  -COVID negative     2  Chronic hypoxic respiratory failure - on 3 L nasal cannula baseline  -currently saturating over 95% on 3 L nasal cannula and does not appear to be in any discomfort or distress     3  Severe COPD - not in exacerbation     4  Stage I A adenocarcinoma of the right lung - status post SBRT     5  Diastolic CHF - does not appear volume overloaded     6  CKD 3     7  History of nicotine dependence           Plan:     -continue supplemental oxygen and titrate as tolerated; goal SpO2 > 88%  -continue steroids for 2 more days; seems to be helping his pain  Not for COPD exacerbation  -DuoNeb p r n   -continue Breo and add Spiriva; patient may resume home Trelegy as an outpatient (not on hospital formulary)  -continue p o  Augmentin 875 mg b i d   X5 days  -pleural effusion appears stable  -diuretics per primary  -continue respiratory and airway clearance protocol  -out of bed as tolerated and incentive spirometry  -continue Lidoderm patch       Pulmonary will sign-off; please tiger text with any questions or concerns      SUBJECTIVE     Patient seen and examined  No acute events overnight  Resting comfortably in no acute distress  Reports left-sided chest wall pain improved as compared to previous day  Denies any significant shortness of breath  No Major complaints  OBJECTIVE     Vitals:    22 2236 22 0552 22 0709 22 0744   BP: 131/65  131/63    BP Location:       Pulse:   71    Resp: 18      Temp: 97 6 °F (36 4 °C)  97 6 °F (36 4 °C)    TempSrc: Oral  Oral    SpO2:   98% 100%   Weight:  100 kg (220 lb 10 9 oz)     Height:          Temperature:   Temp (24hrs), Av 7 °F (36 5 °C), Min:97 6 °F (36 4 °C), Max:98 °F (36 7 °C)    Temperature: 97 6 °F (36 4 °C)  Intake & Output:  I/O        0701   0700  0701   0700  0701   0700    P  O   240 180    Total Intake(mL/kg)  240 (2 4) 180 (1 8)    Urine (mL/kg/hr)  250 (0 1)     Total Output  250     Net  -10 +180               Weights:   IBW (Ideal Body Weight): 77 6 kg    Body mass index is 29 93 kg/m²  Weight (last 2 days)     Date/Time Weight    22 0552 100 (220 68)    22 1500 107 (236)    22 0249 107 (236)        Physical Exam  Vitals reviewed  Constitutional:       General: He is not in acute distress  Appearance: He is obese  He is not ill-appearing, toxic-appearing or diaphoretic  HENT:      Head: Normocephalic and atraumatic  Right Ear: External ear normal       Left Ear: External ear normal       Nose: Nose normal    Eyes:      General: No scleral icterus  Right eye: No discharge  Left eye: No discharge  Extraocular Movements: Extraocular movements intact  Conjunctiva/sclera: Conjunctivae normal    Cardiovascular:      Rate and Rhythm: Normal rate and regular rhythm  Pulses: Normal pulses  Heart sounds: Normal heart sounds  No murmur heard  No friction rub   No gallop  Pulmonary:      Effort: Pulmonary effort is normal       Comments: Diminished breath sounds at lung bases  Reproducible tenderness to palpation of left inferior rib cage  Improved since yesterday  Abdominal:      General: Bowel sounds are normal  There is no distension  Palpations: Abdomen is soft  Tenderness: There is no abdominal tenderness  There is no guarding or rebound  Musculoskeletal:      Right lower leg: No edema  Left lower leg: No edema  Skin:     General: Skin is warm  Neurological:      Mental Status: He is alert and oriented to person, place, and time  LABORATORY DATA     Labs: I have personally reviewed pertinent reports  Results from last 7 days   Lab Units 02/22/22  0452 02/21/22  0322   WBC Thousand/uL 14 67* 14 76*   HEMOGLOBIN g/dL 10 2* 10 3*   HEMATOCRIT % 32 7* 33 4*   PLATELETS Thousands/uL 223 220   NEUTROS PCT % 88* 83*   MONOS PCT % 5 9      Results from last 7 days   Lab Units 02/22/22  0452 02/21/22  0322   POTASSIUM mmol/L 4 9 4 6   CHLORIDE mmol/L 101 100   CO2 mmol/L 25 27   BUN mg/dL 35* 26*   CREATININE mg/dL 1 62* 1 64*   CALCIUM mg/dL 9 4 9 0   ALK PHOS U/L 98 108   ALT U/L 15 14   AST U/L 12 5     Results from last 7 days   Lab Units 02/22/22  0452   MAGNESIUM mg/dL 2 4     Results from last 7 days   Lab Units 02/22/22  0452   PHOSPHORUS mg/dL 3 5                      ABG:       IMAGING & DIAGNOSTIC TESTING     Radiology Results: I have personally reviewed pertinent reports  X-ray chest 1 view portable    Result Date: 2/21/2022  Impression: Stable chest x-ray with no new findings  Workstation performed: [id number]     CTA ED chest PE study    Result Date: 2/21/2022  Impression: No evidence of pulmonary embolus  Findings consistent with bronchiolitis secondary to infection or aspiration at the anterior left upper lobe/lingula  Stable right-sided volume loss with areas of rounded atelectasis   Workstation performed: [id number]     Other Diagnostic Testing: I have personally reviewed pertinent reports      ACTIVE MEDICATIONS     Current Facility-Administered Medications   Medication Dose Route Frequency    acetaminophen (TYLENOL) tablet 650 mg  650 mg Oral Q6H PRN    aluminum-magnesium hydroxide-simethicone (MYLANTA) oral suspension 30 mL  30 mL Oral Q6H PRN    amoxicillin-clavulanate (AUGMENTIN) 875-125 mg per tablet 1 tablet  1 tablet Oral Q12H [address]    cyclobenzaprine (FLEXERIL) tablet 10 mg  10 mg Oral TID    docusate sodium (COLACE) capsule 100 mg  100 mg Oral BID PRN    enoxaparin (LOVENOX) subcutaneous injection 40 mg  40 mg Subcutaneous Daily    ferrous sulfate tablet 325 mg  325 mg Oral Daily With Breakfast    fluticasone-vilanterol (BREO ELLIPTA) 100-25 mcg/inh inhaler 1 puff  1 puff Inhalation Daily    furosemide (LASIX) tablet 40 mg  40 mg Oral Daily    guaiFENesin (MUCINEX) 12 hr tablet 600 mg  600 mg Oral BID    HYDROmorphone (DILAUDID) injection 0 5 mg  0 5 mg Intravenous Q4H PRN    insulin lispro (HumaLOG) 100 units/mL subcutaneous injection 1-5 Units  1-5 Units Subcutaneous HS    insulin lispro (HumaLOG) 100 units/mL subcutaneous injection 1-6 Units  1-6 Units Subcutaneous TID AC    insulin regular (HumuLIN R U-500) 500 units/mL CONCENTRATED injection 45 Units  45 Units Subcutaneous BID AC    ipratropium-albuterol (DUO-NEB) 0 5-2 5 mg/3 mL inhalation solution 3 mL  3 mL Nebulization Q6H PRN    lidocaine (LIDODERM) 5 % patch 1 patch  1 patch Topical Daily    metoprolol tartrate (LOPRESSOR) tablet 25 mg  25 mg Oral BID    ondansetron (ZOFRAN) injection 4 mg  4 mg Intravenous Q6H PRN    oxyCODONE (ROXICODONE) IR tablet 2 5 mg  2 5 mg Oral Q4H PRN    oxyCODONE (ROXICODONE) IR tablet 5 mg  5 mg Oral Q4H PRN    potassium chloride (K-DUR,KLOR-CON) CR tablet 20 mEq  20 mEq Oral Daily    pravastatin (PRAVACHOL) tablet 80 mg  80 mg Oral Daily With Dinner    predniSONE tablet 40 mg  40 mg Oral Daily    sodium chloride (PF) 0 9 % injection 3 mL  3 mL Intravenous Q1H PRN    tiotropium (SPIRIVA) capsule for inhaler 18 mcg  18 mcg Inhalation Daily           Portions of the record may have been created with voice recognition software  Occasional wrong word or "sound a like" substitutions may have occurred due to the inherent limitations of voice recognition software    Read the chart carefully and recognize, using context, where substitutions have occurred   ==  Odilia Rowe, [address] [place] [address]  Pulmonary/Critical Care Fellowship PGY-5

## 2022-02-22 NOTE — ASSESSMENT & PLAN NOTE
· Patient is noted to have worse hypoxia with current use of 3 L O2 (is on home O2 3 L) noted saturation from usual 96 to 97 currently at 92  · Started patient on prednisone 40 mg daily  · Continue Xopenex inhalation as needed  · On Brio Ellipta 100/251 puff daily  · Also continue Atrovent t i d    · Pulmonary consult appreciated

## 2022-02-22 NOTE — RESPIRATORY THERAPY NOTE
resp care      02/22/22 0744   Respiratory Assessment   Assessment Type Assess only   General Appearance Awake; Alert   Respiratory Pattern Normal   Chest Assessment Chest expansion symmetrical   Bilateral Breath Sounds Diminished;Clear   Resp Comments Pt offers no resp c/o  States 3lpm nc at home along with prn udn  Pt denies need for tx at this time  BS clear, with strong np loose cough  Mucinex ordered  PT confirms flutter valve at home but does not use  due to it making him more SOB  Pt states he would not use if given one at this time   Will cont to follow with prn duoneb as ordered by [facility]     Oxygen Therapy/Pulse Ox   O2 Device Nasal cannula   Nasal Cannula O2 Flow Rate (L/min) 3 L/min   Calculated FIO2 (%) - Nasal Cannula 32   SpO2 100 %   $ Pulse Oximetry Spot Check Charge Completed

## 2022-02-22 NOTE — PLAN OF CARE
Problem: Potential for Falls  Goal: Patient will remain free of falls  Description: INTERVENTIONS:  - Educate patient/family on patient safety including physical limitations  - Instruct patient to call for assistance with activity   - Consult OT/PT to assist with strengthening/mobility   - Keep Call bell within reach  - Keep bed low and locked with side rails adjusted as appropriate  - Keep care items and personal belongings within reach  - Initiate and maintain comfort rounds  - Make Fall Risk Sign visible to staff  - Offer Toileting every  Hours, in advance of need  - Initiate/Maintain alarm  - Obtain necessary fall risk management equipment:   - Apply yellow socks and bracelet for high fall risk patients  - Consider moving patient to room near nurses station  Outcome: Progressing     Problem: Prexisting or High Potential for Compromised Skin Integrity  Goal: Skin integrity is maintained or improved  Description: INTERVENTIONS:  - Identify patients at risk for skin breakdown  - Assess and monitor skin integrity  - Assess and monitor nutrition and hydration status  - Monitor labs   - Assess for incontinence   - Turn and reposition patient  - Assist with mobility/ambulation  - Relieve pressure over bony prominences  - Avoid friction and shearing  - Provide appropriate hygiene as needed including keeping skin clean and dry  - Evaluate need for skin moisturizer/barrier cream  - Collaborate with interdisciplinary team   - Patient/family teaching  - Consider wound care consult   Outcome: Progressing     Problem: MOBILITY - ADULT  Goal: Maintain or return to baseline ADL function  Description: INTERVENTIONS:  -  Assess patient's ability to carry out ADLs; assess patient's baseline for ADL function and identify physical deficits which impact ability to perform ADLs (bathing, care of mouth/teeth, toileting, grooming, dressing, etc )  - Assess/evaluate cause of self-care deficits   - Assess range of motion  - Assess patient's mobility; develop plan if impaired  - Assess patient's need for assistive devices and provide as appropriate  - Encourage maximum independence but intervene and supervise when necessary  - Involve family in performance of ADLs  - Assess for home care needs following discharge   - Consider OT consult to assist with ADL evaluation and planning for discharge  - Provide patient education as appropriate  Outcome: Progressing  Goal: Maintains/Returns to pre admission functional level  Description: INTERVENTIONS:  - Perform BMAT or MOVE assessment daily    - Set and communicate daily mobility goal to care team and patient/family/caregiver  - Collaborate with rehabilitation services on mobility goals if consulted  - Perform Range of Motion  times a day  - Reposition patient every  hours    - Dangle patient  times a day  - Stand patient  times a day  - Ambulate patient  times a day  - Out of bed to chair  times a day   - Out of bed for meal times a day  - Out of bed for toileting  - Record patient progress and toleration of activity level   Outcome: Progressing     Problem: CARDIOVASCULAR - ADULT  Goal: Maintains optimal cardiac output and hemodynamic stability  Description: INTERVENTIONS:  - Monitor I/O, vital signs and rhythm  - Monitor for S/S and trends of decreased cardiac output  - Administer and titrate ordered vasoactive medications to optimize hemodynamic stability  - Assess quality of pulses, skin color and temperature  - Assess for signs of decreased coronary artery perfusion  - Instruct patient to report change in severity of symptoms  Outcome: Progressing  Goal: Absence of cardiac dysrhythmias or at baseline rhythm  Description: INTERVENTIONS:  - Continuous cardiac monitoring, vital signs, obtain 12 lead EKG if ordered  - Administer antiarrhythmic and heart rate control medications as ordered  - Monitor electrolytes and administer replacement therapy as ordered  Outcome: Progressing     Problem: RESPIRATORY - ADULT  Goal: Achieves optimal ventilation and oxygenation  Description: INTERVENTIONS:  - Assess for changes in respiratory status  - Assess for changes in mentation and behavior  - Position to facilitate oxygenation and minimize respiratory effort  - Oxygen administered by appropriate delivery if ordered  - Initiate smoking cessation education as indicated  - Encourage broncho-pulmonary hygiene including cough, deep breathe, Incentive Spirometry  - Assess the need for suctioning and aspirate as needed  - Assess and instruct to report SOB or any respiratory difficulty  - Respiratory Therapy support as indicated  Outcome: Progressing

## 2022-02-22 NOTE — ASSESSMENT & PLAN NOTE
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  · Completed SBRT to [facility] on 12/31/20  · Last seen by radiation oncology on 9/14/21  · He had CTA study on July 23, 2021 - "Stable right lung volume loss secondary to areas of rounded atelectasis at the right middle and lower lobes   Small/moderate size right pleural effusion "  · CXR this admission - "Stable right lung volume loss with known right lung base round atelectasis and loculated pleural effusion "  · Recommended to have repeat CT chest January 2022 and f/u as outpatient- this was done and did not show any acute findings   · Continue to recommend outpatient f/u

## 2022-02-22 NOTE — ASSESSMENT & PLAN NOTE
· Patient came in with new onset left chest wall pain that is almost 24 hours old and is worsening throughout the night he cannot sleep  This really sounds non-cardiac and more pleuritic or muscular in nature  · Noted the patient also has worsening hypoxia from 3 L O2 with saturation of 96 to 97% currently he is 92  · Could be pleurisy/costrochondriotis in relation to severe COPD  · Patient is also due for CT scan of the chest for follow-up adeno carcinoma of the right lung (see note of January 30, 2022 discharge summary); patient has not had such study done  Emergency room ordered for CTA chest PE protocol- reviewed results with patient- possible bronchiolitis contributing to symptoms   · Pulmonary consult appreciated   · Pain control:  Geriatric pain protocol    · Will also continue with oxygen supplementation:  3 L nasal cannula O2- at current baseline

## 2022-02-22 NOTE — PROGRESS NOTES
[address] [facility]  Progress Note - Louie Jensen Sr  1945, 78 y o  male MRN: [id number]  Unit/Bed#: -01 Encounter: [id number]  Primary Care Provider: Staci Garvey MD   Date and time admitted to hospital: 2/21/2022  2:45 AM    * Left-sided chest wall pain  Assessment & Plan  · Patient came in with new onset left chest wall pain that is almost 24 hours old and is worsening throughout the night he cannot sleep  This really sounds non-cardiac and more pleuritic or muscular in nature  · Noted the patient also has worsening hypoxia from 3 L O2 with saturation of 96 to 97% currently he is 92  · Could be pleurisy/costrochondriotis in relation to severe COPD  · Patient is also due for CT scan of the chest for follow-up adeno carcinoma of the right lung (see note of January 30, 2022 discharge summary); patient has not had such study done  Emergency room ordered for CTA chest PE protocol- reviewed results with patient- possible bronchiolitis contributing to symptoms   · Pulmonary consult appreciated   · Pain control:  Geriatric pain protocol  · Will also continue with oxygen supplementation:  3 L nasal cannula O2- at current baseline     COPD with acute exacerbation ([facility] [address] )  Assessment & Plan  · Patient is noted to have worse hypoxia with current use of 3 L O2 (is on home O2 3 L) noted saturation from usual 96 to 97 currently at 92  · Started patient on prednisone 40 mg daily  · Continue Xopenex inhalation as needed  · On Brio Ellipta 100/251 puff daily  · Also continue Atrovent t i d  · Pulmonary consult appreciated     Type 2 diabetes mellitus with hyperglycemia, with long-term current use of insulin [facility])  Assessment & Plan  Lab Results   Component Value Date    HGBA1C 8 8 (H) 02/21/2022     On insulin regular  45 units twice daily with insulin sliding scale      Blood Sugar Average: Last 72 hrs:  (P) 368 2    Adenocarcinoma of lung ([address] )  Assessment & Plan  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  · Completed SBRT to [facility] on 12/31/20  · Last seen by radiation oncology on 9/14/21  · He had CTA study on July 23, 2021 - "Stable right lung volume loss secondary to areas of rounded atelectasis at the right middle and lower lobes  Small/moderate size right pleural effusion "  · CXR this admission - "Stable right lung volume loss with known right lung base round atelectasis and loculated pleural effusion "  · Recommended to have repeat CT chest January 2022 and f/u as outpatient- this was done and did not show any acute findings   · Continue to recommend outpatient f/u        Pleural effusion on right  Assessment & Plan  · According to previous reports this has been stable  · No evidence on CT     CKD (chronic kidney disease) stage 3, GFR 30-59 ml/min [facility])  Assessment & Plan  Lab Results   Component Value Date    EGFR 41 02/22/2022    EGFR 40 02/21/2022    EGFR 50 01/30/2022    CREATININE 1 62 (H) 02/22/2022    CREATININE 1 64 (H) 02/21/2022    CREATININE 1 37 (H) 01/30/2022   · creatinine is 1 64 today  · Will trend  · Follow UO  · Avoid hypotension as able         VTE Pharmacologic Prophylaxis: VTE Score: 3 Moderate Risk (Score 3-4) - Pharmacological DVT Prophylaxis Ordered: enoxaparin (Lovenox)  Patient Centered Rounds: I performed bedside rounds with nursing staff today  Discussions with Specialists or Other Care Team Provider: Primary RN    Education and Discussions with Family / Patient: Patient declined call to   Time Spent for Care: 20 minutes  More than 50% of total time spent on counseling and coordination of care as described above      Current Length of Stay: 1 day(s)  Current Patient Status: Inpatient   Certification Statement: The patient will continue to require additional inpatient hospital stay due to COPD, left CP  Discharge Plan: Anticipate discharge tomorrow to home     Code Status: Level 1 - Full Code    Subjective:   Patient states his pain is better today  He is having a cough but is not able to move any mucus  No fever or chills  He thinks he is close to back to his baseline  Should be able to go home tomorrow  No abdominal pain, nausea vomiting diarrhea constipation  Objective:     Vitals:   Temp (24hrs), Av 7 °F (36 5 °C), Min:97 6 °F (36 4 °C), Max:98 °F (36 7 °C)    Temp:  [97 6 °F (36 4 °C)-98 °F (36 7 °C)] 97 6 °F (36 4 °C)  HR:  [70-72] 71  Resp:  [18-20] 18  BP: (131-150)/(63-70) 131/63  SpO2:  [97 %-100 %] 100 %  Body mass index is 29 93 kg/m²  Input and Output Summary (last 24 hours): Intake/Output Summary (Last 24 hours) at 2022 1129  Last data filed at 2022 0900  Gross per 24 hour   Intake 420 ml   Output 250 ml   Net 170 ml       Physical Exam:   Physical Exam  Constitutional:       Appearance: Normal appearance  HENT:      Nose: Nose normal       Mouth/Throat:      Mouth: Mucous membranes are moist    Cardiovascular:      Rate and Rhythm: Normal rate and regular rhythm  Pulses: Normal pulses  Heart sounds: Normal heart sounds  Pulmonary:      Effort: Pulmonary effort is normal       Breath sounds: Normal breath sounds  Abdominal:      General: Abdomen is flat  Palpations: Abdomen is soft  Musculoskeletal:         General: Normal range of motion  Skin:     General: Skin is warm and dry  Capillary Refill: Capillary refill takes less than 2 seconds  Neurological:      General: No focal deficit present  Mental Status: He is alert and oriented to person, place, and time     Psychiatric:         Mood and Affect: Mood normal             Additional Data:     Labs:  Results from last 7 days   Lab Units 22  0452   WBC Thousand/uL 14 67*   HEMOGLOBIN g/dL 10 2*   HEMATOCRIT % 32 7*   PLATELETS Thousands/uL 223   NEUTROS PCT % 88*   LYMPHS PCT % 6*   MONOS PCT % 5   EOS PCT % 0     Results from last 7 days   Lab Units 02/22/22  0452   SODIUM mmol/L 132*   POTASSIUM mmol/L 4 9   CHLORIDE mmol/L 101   CO2 mmol/L 25   BUN mg/dL 35*   CREATININE mg/dL 1 62*   ANION GAP mmol/L 6   CALCIUM mg/dL 9 4   ALBUMIN g/dL 2 8*   TOTAL BILIRUBIN mg/dL 0 38   ALK PHOS U/L 98   ALT U/L 15   AST U/L 12   GLUCOSE RANDOM mg/dL 169*         Results from last 7 days   Lab Units 02/22/22  0617 02/21/22  2051 02/21/22  1658 02/21/22  1127 02/21/22  0744   POC GLUCOSE mg/dl 223* 354* 397* 451* 416*     Results from last 7 days   Lab Units 02/21/22  0521   HEMOGLOBIN A1C % 8 8*     Results from last 7 days   Lab Units 02/21/22  1325 02/21/22  1130   PROCALCITONIN ng/ml 0 64* 0 26*       Lines/Drains:  Invasive Devices  Report    Peripheral Intravenous Line            Peripheral IV 02/21/22 Left Antecubital 1 day                      Imaging: Reviewed radiology reports from this admission including: chest CT scan    Recent Cultures (last 7 days):         Last 24 Hours Medication List:   Current Facility-Administered Medications   Medication Dose Route Frequency Provider Last Rate    acetaminophen  650 mg Oral Q6H PRN Courtney Menjivar MD      aluminum-magnesium hydroxide-simethicone  30 mL Oral Q6H PRN Courtney Menjivar MD      amoxicillin-clavulanate  1 tablet Oral Q12H [facility],       cyclobenzaprine  10 mg Oral TID Courtney Menjivar MD      docusate sodium  100 mg Oral BID PRN Courtney Menjivar MD      enoxaparin  40 mg Subcutaneous Daily Courtney Menjivar MD      ferrous sulfate  325 mg Oral Daily With Breakfast Courtney Menjivar MD      fluticasone-vilanterol  1 puff Inhalation Daily Courtney Menjivar MD      furosemide  40 mg Oral Daily Courtney Menjivar MD      guaiFENesin  600 mg Oral BID Courtney Menjivar MD      HYDROmorphone  0 5 mg Intravenous Q4H PRN Courtney Menjivar MD      insulin lispro  1-5 Units Subcutaneous HS Courtney Menjivar MD      insulin lispro  1-6 Units Subcutaneous TID Jag Page MD  insulin regular  45 Units Subcutaneous BID AC Jennyfer Smith MD      ipratropium-albuterol  3 mL Nebulization Q6H PRN Amelia Newman DO      lidocaine  1 patch Topical Daily Odilia Rosenthal DO      metoprolol tartrate  25 mg Oral BID Jennyfer Smith MD      ondansetron  4 mg Intravenous Q6H PRN Jennyfer mSith MD      oxyCODONE  2 5 mg Oral Q4H PRN Jennyfer Smith MD      oxyCODONE  5 mg Oral Q4H PRN Jennyfer Smith MD      potassium chloride  20 mEq Oral Daily Jennyfer Smith MD      pravastatin  80 mg Oral Daily With Blake Barbour MD      predniSONE  40 mg Oral Daily Mel Garcia DO      sodium chloride (PF)  3 mL Intravenous Q1H PRN Jennyfer Smith MD      tiotropium  18 mcg Inhalation Daily Amelia Newman DO          Today, Patient Was Seen By: YOLANDE Avelar    **Please Note: This note may have been constructed using a voice recognition system  **

## 2022-02-22 NOTE — RESPIRATORY THERAPY NOTE
resp care      02/22/22 0653   Respiratory Protocol   Respiratory Plan Discontinue Protocol   Respiratory Assessment   Resp Comments prn duoneb ordered by SLPM  PT not to be placed on resp protocol

## 2022-02-22 NOTE — ASSESSMENT & PLAN NOTE
Lab Results   Component Value Date    EGFR 41 02/22/2022    EGFR 40 02/21/2022    EGFR 50 01/30/2022    CREATININE 1 62 (H) 02/22/2022    CREATININE 1 64 (H) 02/21/2022    CREATININE 1 37 (H) 01/30/2022   · creatinine is 1 64 today  · Will trend  · Follow UO  · Avoid hypotension as able

## 2022-02-23 VITALS
TEMPERATURE: 98.6 F | OXYGEN SATURATION: 95 % | RESPIRATION RATE: 20 BRPM | SYSTOLIC BLOOD PRESSURE: 181 MMHG | HEIGHT: 72 IN | WEIGHT: 220.68 LBS | HEART RATE: 117 BPM | BODY MASS INDEX: 29.89 KG/M2 | DIASTOLIC BLOOD PRESSURE: 116 MMHG

## 2022-02-23 LAB — GLUCOSE SERPL-MCNC: 306 MG/DL (ref 65–140)

## 2022-02-23 PROCEDURE — 82948 REAGENT STRIP/BLOOD GLUCOSE: CPT

## 2022-02-23 PROCEDURE — 99239 HOSP IP/OBS DSCHRG MGMT >30: CPT | Performed by: NURSE PRACTITIONER

## 2022-02-23 RX ORDER — AMOXICILLIN AND CLAVULANATE POTASSIUM 875; 125 MG/1; MG/1
1 TABLET, FILM COATED ORAL EVERY 12 HOURS SCHEDULED
Qty: 7 TABLET | Refills: 0 | Status: SHIPPED | OUTPATIENT
Start: 2022-02-23 | End: 2022-02-25

## 2022-02-23 RX ORDER — CYCLOBENZAPRINE HCL 10 MG
10 TABLET ORAL 3 TIMES DAILY
Qty: 30 TABLET | Refills: 0 | Status: SHIPPED | OUTPATIENT
Start: 2022-02-23 | End: 2022-05-17 | Stop reason: SDUPTHER

## 2022-02-23 RX ORDER — BLOOD SUGAR DIAGNOSTIC
STRIP MISCELLANEOUS
Qty: 100 EACH | Refills: 0 | Status: SHIPPED | OUTPATIENT
Start: 2022-02-23

## 2022-02-23 RX ORDER — FERROUS SULFATE 325(65) MG
325 TABLET ORAL
Qty: 30 TABLET | Refills: 0 | Status: SHIPPED | OUTPATIENT
Start: 2022-02-23 | End: 2022-05-17 | Stop reason: SDUPTHER

## 2022-02-23 RX ORDER — OXYCODONE AND ACETAMINOPHEN 7.5; 325 MG/1; MG/1
1 TABLET ORAL EVERY 4 HOURS PRN
Qty: 30 TABLET | Refills: 0 | Status: SHIPPED | OUTPATIENT
Start: 2022-02-23 | End: 2022-02-28

## 2022-02-23 RX ADMIN — METOPROLOL TARTRATE 25 MG: 25 TABLET, FILM COATED ORAL at 08:14

## 2022-02-23 RX ADMIN — ENOXAPARIN SODIUM 40 MG: 40 INJECTION SUBCUTANEOUS at 08:14

## 2022-02-23 RX ADMIN — FLUTICASONE FUROATE AND VILANTEROL TRIFENATATE 1 PUFF: 100; 25 POWDER RESPIRATORY (INHALATION) at 08:15

## 2022-02-23 RX ADMIN — AMOXICILLIN AND CLAVULANATE POTASSIUM 1 TABLET: 875; 125 TABLET, FILM COATED ORAL at 08:14

## 2022-02-23 RX ADMIN — PREDNISONE 40 MG: 20 TABLET ORAL at 08:15

## 2022-02-23 RX ADMIN — FUROSEMIDE 40 MG: 40 TABLET ORAL at 08:14

## 2022-02-23 RX ADMIN — OXYCODONE HYDROCHLORIDE 5 MG: 5 TABLET ORAL at 08:13

## 2022-02-23 RX ADMIN — TIOTROPIUM BROMIDE 18 MCG: 18 CAPSULE ORAL; RESPIRATORY (INHALATION) at 08:15

## 2022-02-23 RX ADMIN — INSULIN HUMAN 45 UNITS: 500 INJECTION, SOLUTION SUBCUTANEOUS at 08:14

## 2022-02-23 RX ADMIN — FERROUS SULFATE TAB 325 MG (65 MG ELEMENTAL FE) 325 MG: 325 (65 FE) TAB at 08:14

## 2022-02-23 RX ADMIN — POTASSIUM CHLORIDE 20 MEQ: 1500 TABLET, EXTENDED RELEASE ORAL at 08:14

## 2022-02-23 RX ADMIN — CYCLOBENZAPRINE HYDROCHLORIDE 10 MG: 10 TABLET, FILM COATED ORAL at 08:14

## 2022-02-23 RX ADMIN — INSULIN LISPRO 4 UNITS: 100 INJECTION, SOLUTION INTRAVENOUS; SUBCUTANEOUS at 08:15

## 2022-02-23 RX ADMIN — GUAIFENESIN 600 MG: 600 TABLET, EXTENDED RELEASE ORAL at 08:14

## 2022-02-23 NOTE — NURSING NOTE
DC instructions given to patient  Verbalized understanding  Patient provided list of PCP per request  Awaiting ride with oxygen

## 2022-02-23 NOTE — ASSESSMENT & PLAN NOTE
Lab Results   Component Value Date    HGBA1C 8 8 (H) 02/21/2022     · On insulin regular  45 units twice daily with insulin sliding scale    · BS hyperglycemic due to steroids, expect they will lower as he gets home and the steroids are completed (he had 3 days already, no longer indicated)    Blood Sugar Average: Last 72 hrs:  (P) 125 [id number]

## 2022-02-23 NOTE — ASSESSMENT & PLAN NOTE
· Patient is noted to have worse hypoxia with current use of 3 L O2 (is on home O2 3 L) noted saturation from usual 96 to 97 currently at 92  · Was given prednisone 40 mg daily X3, then stopped   · Continue Xopenex inhalation as needed  · On Brio Ellipta 100/251 puff daily  · Also continue Atrovent t i d    · Pulmonary consult appreciated

## 2022-02-23 NOTE — CASE MANAGEMENT
Case Management Discharge Planning Note    Patient name Elsy Cancino  Location /-61 MRN [id number]  : 1945 Date 2022       Current Admission Date: 2022  Current Admission Diagnosis:Left-sided chest wall pain   Patient Active Problem List    Diagnosis Date Noted    Left-sided chest wall pain 2022    CKD (chronic kidney disease) stage 3, GFR 30-59 ml/min [facility] ) 2022    History of prostate cancer 2022    UTI (urinary tract infection) 2022    Adenocarcinoma of lung ([facility] ) 2022    Fracture of navicular bone of left foot 2021    Chronic respiratory failure with hypoxia ([facility] ) 04/15/2021    Obesity, morbid ([facility] ) 2021    PAD (peripheral artery disease) ([facility] ) 2021    History of lung cancer 2020    DDD (degenerative disc disease), lumbar 2020    Anemia, iron deficiency 2020    Lung nodule 2020    Pulmonary hypertension ([facility] ) 2019    JACQUELINE (obstructive sleep apnea) 2019    COPD with acute exacerbation ([facility] ) 2019    Oxygen dependent 2018    RBBB 2018    CAD (coronary artery disease) 2018    Chronic diastolic heart failure ([facility] ) 2018    Pleural effusion on right 10/31/2017    COPD, severe ([facility] ) 2017    Diabetic neuropathy ([facility] ) 2017    Essential hypertension 2016    Venous stasis dermatitis of both lower extremities 11/15/2016    Type 2 diabetes mellitus with hyperglycemia, with long-term current use of insulin ([facility] ) 2016    COPD exacerbation ([facility] ) 2016    Obesity (BMI 30-39 9) 2016    HLD (hyperlipidemia) 2016      LOS (days): 2  Geometric Mean LOS (GMLOS) (days): 3 10  Days to GMLOS:0 8     OBJECTIVE:  Risk of Unplanned Readmission Score: 33         Current admission status: Inpatient   Preferred Pharmacy:   [address] [place] [address], [address] [id number]  Phone: [phone] Fax: [phone]    Primary Care Provider: Keren Van MD    Primary Insurance: [facility]  Secondary Insurance:     DISCHARGE DETAILS:                                          Other Referral/Resources/Interventions Provided:  Interventions: DME  Referral Comments: Pt's son Lucia Thakur here to pick pt up for transport to home today  Pt does not have an oxygen tank with him for transport  pt states he does nto need it as he lives close by  This CM stated to wait and not leave as pt does need 3 liters of ocyegn contiunous and will obtai na tank fopr transport home  Pt also requested information about looking for another apartment in a [facility]  Informed pt to wait and this information will be obtained and brought to him before he leaves along with the oxygen tank  pt agreeble  Upon return to the pt's room not 15 mins later, with portable oxygen tank and information for [place] ,  pt already left and per primary nurse inés pt left without oxygen and did not want to wait

## 2022-02-23 NOTE — ASSESSMENT & PLAN NOTE
Lab Results   Component Value Date    EGFR 41 02/22/2022    EGFR 40 02/21/2022    EGFR 50 01/30/2022    CREATININE 1 62 (H) 02/22/2022    CREATININE 1 64 (H) 02/21/2022    CREATININE 1 37 (H) 01/30/2022   · creatinine is 1 62 at last check  · Follow up as outpatient   · Avoid hypotension as able

## 2022-02-23 NOTE — PLAN OF CARE
Problem: Potential for Falls  Goal: Patient will remain free of falls  Description: INTERVENTIONS:  - Educate patient/family on patient safety including physical limitations  - Instruct patient to call for assistance with activity   - Consult OT/PT to assist with strengthening/mobility   - Keep Call bell within reach  - Keep bed low and locked with side rails adjusted as appropriate  - Keep care items and personal belongings within reach  - Initiate and maintain comfort rounds  - Make Fall Risk Sign visible to staff  - Offer Toileting every  Hours, in advance of need  - Initiate/Maintain alarm  - Obtain necessary fall risk management equipment:   - Apply yellow socks and bracelet for high fall risk patients  - Consider moving patient to room near nurses station  Outcome: Progressing     Problem: Prexisting or High Potential for Compromised Skin Integrity  Goal: Skin integrity is maintained or improved  Description: INTERVENTIONS:  - Identify patients at risk for skin breakdown  - Assess and monitor skin integrity  - Assess and monitor nutrition and hydration status  - Monitor labs   - Assess for incontinence   - Turn and reposition patient  - Assist with mobility/ambulation  - Relieve pressure over bony prominences  - Avoid friction and shearing  - Provide appropriate hygiene as needed including keeping skin clean and dry  - Evaluate need for skin moisturizer/barrier cream  - Collaborate with interdisciplinary team   - Patient/family teaching  - Consider wound care consult   Outcome: Progressing     Problem: MOBILITY - ADULT  Goal: Maintain or return to baseline ADL function  Description: INTERVENTIONS:  -  Assess patient's ability to carry out ADLs; assess patient's baseline for ADL function and identify physical deficits which impact ability to perform ADLs (bathing, care of mouth/teeth, toileting, grooming, dressing, etc )  - Assess/evaluate cause of self-care deficits   - Assess range of motion  - Assess patient's mobility; develop plan if impaired  - Assess patient's need for assistive devices and provide as appropriate  - Encourage maximum independence but intervene and supervise when necessary  - Involve family in performance of ADLs  - Assess for home care needs following discharge   - Consider OT consult to assist with ADL evaluation and planning for discharge  - Provide patient education as appropriate  Outcome: Progressing  Goal: Maintains/Returns to pre admission functional level  Description: INTERVENTIONS:  - Perform BMAT or MOVE assessment daily    - Set and communicate daily mobility goal to care team and patient/family/caregiver  - Collaborate with rehabilitation services on mobility goals if consulted  - Perform Range of Motion  times a day  - Reposition patient every  hours    - Dangle patient  times a day  - Stand patient  times a day  - Ambulate patient  times a day  - Out of bed to chair  times a day   - Out of bed for meals  times a day  - Out of bed for toileting  - Record patient progress and toleration of activity level   Outcome: Progressing     Problem: CARDIOVASCULAR - ADULT  Goal: Maintains optimal cardiac output and hemodynamic stability  Description: INTERVENTIONS:  - Monitor I/O, vital signs and rhythm  - Monitor for S/S and trends of decreased cardiac output  - Administer and titrate ordered vasoactive medications to optimize hemodynamic stability  - Assess quality of pulses, skin color and temperature  - Assess for signs of decreased coronary artery perfusion  - Instruct patient to report change in severity of symptoms  Outcome: Progressing  Goal: Absence of cardiac dysrhythmias or at baseline rhythm  Description: INTERVENTIONS:  - Continuous cardiac monitoring, vital signs, obtain 12 lead EKG if ordered  - Administer antiarrhythmic and heart rate control medications as ordered  - Monitor electrolytes and administer replacement therapy as ordered  Outcome: Progressing     Problem: RESPIRATORY - ADULT  Goal: Achieves optimal ventilation and oxygenation  Description: INTERVENTIONS:  - Assess for changes in respiratory status  - Assess for changes in mentation and behavior  - Position to facilitate oxygenation and minimize respiratory effort  - Oxygen administered by appropriate delivery if ordered  - Initiate smoking cessation education as indicated  - Encourage broncho-pulmonary hygiene including cough, deep breathe, Incentive Spirometry  - Assess the need for suctioning and aspirate as needed  - Assess and instruct to report SOB or any respiratory difficulty  - Respiratory Therapy support as indicated  Outcome: Progressing

## 2022-02-23 NOTE — APP STUDENT NOTE
ZULEMA STUDENT  Inpatient Progress Note for TRAINING ONLY  Not Part of Legal Medical Record       Progress Note - Antonia Brantley Sr  78 y o  male MRN: [id number]    Unit/Bed#: -01 Encounter: [id number]      Assessment:  1  Left-sided chest wall pain - pleuritic vs musculoskeletal, possible bronchiolitis  2  COPD with acute exacerbation  3  Type 2 diabetes mellitus with hyperglycemia, with long-term current use of insulin  4  Adenocarcinoma of lung s/p SBRT  5  Pleural effusion on right  6  CKD stage 3, GFR 30-59 ml/min    Plan:  1  Stable to discharge home today  His son is going to pick him up  Medication refills were sent to the pharmacy  Continue on 3L of oxygen at home, steroids discontinued  Will take Augmentin for 5 days total - 2 days completed in the hospital  Script sent for 3 more days at home  Follow up with PCP in 1-2 weeks  2  Continue on 3L of oxygen, prednisone discontinued  Continue Brio Ellipta 100/25 one puff daily, Xopenex as needed, and Atrovent three times daily  3  On insulin regular  45 units twice daily at home using sliding scale, continue this for blood sugars  Blood sugars elevated during stay due to steroids and expect lowering as patient is home and since completing steroids  Last blood sugar 306 on 2/23/22  4  Repeat CT showed no acute findings and patient was continued to follow up outpatient  5  Stable, no evidence on CT  6  Follow outpatient, creatinine 1 62 on 2/22/22    Subjective:   Patient is doing well today and had no acute events overnight  He states his chest wall pain has resolved yesterday and he has had no pain today either  He is coughing without mucus  Denies chest pain or extra shortness of breath  Feels he is back to baseline and things the antibiotic is helping significantly  He is on his usual amount of oxygen  Denies fever, chills, nausea, vomiting, diarrhea, constipation, and abdominal pain   He is ready to go home and is going to call his son to come get him  Patient is looking for a new PCP when outpatient  Objective:     Vitals: Blood pressure 133/74, pulse 67, temperature 97 5 °F (36 4 °C), resp  rate 20, height 6' (1 829 m), weight 100 kg (220 lb 10 9 oz), SpO2 100 %  ,Body mass index is 29 93 kg/m²  Intake/Output Summary (Last 24 hours) at 2/23/2022 1042  Last data filed at 2/23/2022 0911  Gross per 24 hour   Intake 660 ml   Output 1000 ml   Net -340 ml       Physical Exam: /74   Pulse 67   Temp 97 5 °F (36 4 °C)   Resp 20   Ht 6' (1 829 m)   Wt 100 kg (220 lb 10 9 oz)   SpO2 100%   BMI 29 93 kg/m²   General appearance: alert and oriented, in no acute distress  Head: Normocephalic, without obvious abnormality, atraumatic  Lungs: clear to auscultation bilaterally  Heart: regular rate and rhythm, S1, S2 normal, no murmur, click, rub or gallop  Abdomen: soft, non-tender; bowel sounds normal; no masses,  no organomegaly  Extremities: extremities normal, warm and well-perfused; no cyanosis, clubbing, or edema  Skin: Skin color, texture, turgor normal  No rashes or lesions     Invasive Devices  Report    None                 Lab, Imaging and other studies: I have personally reviewed pertinent reports  chest CT scan reviewed     VTE Pharmacologic Prophylaxis: Enoxaparin (Lovenox)    CY Schwartz

## 2022-02-23 NOTE — CASE MANAGEMENT
Case Management Assessment & Discharge Planning Note    Patient name Mirtha Garcia  Location /-28 MRN [id number]  : 1945 Date 2022       Current Admission Date: 2022  Current Admission Diagnosis:Left-sided chest wall pain   Patient Active Problem List    Diagnosis Date Noted    Left-sided chest wall pain 2022    CKD (chronic kidney disease) stage 3, GFR 30-59 ml/min ([facility] ) 2022    History of prostate cancer 2022    UTI (urinary tract infection) 2022    [facility] 2022    Fracture of navicular bone of left foot 2021    Chronic respiratory failure with hypoxia ([facility] ) 04/15/2021    Obesity, morbid ([facility] ) 2021    PAD (peripheral artery disease) ([facility] ) 2021    History of lung cancer 2020    DDD (degenerative disc disease), lumbar 2020    Anemia, iron deficiency 2020    Lung nodule 2020    Pulmonary hypertension ([facility] ) 2019    JACQUELINE (obstructive sleep apnea) 2019    COPD with acute exacerbation ([facility] ) 2019    Oxygen dependent 2018    RBBB 2018    CAD (coronary artery disease) 2018    Chronic diastolic heart failure ([facility] ) 2018    Pleural effusion on right 10/31/2017    COPD, severe ([address] ) 2017    Diabetic neuropathy ([facility] ) 2017    Essential hypertension 2016    Venous stasis dermatitis of both lower extremities 11/15/2016    Type 2 diabetes mellitus with hyperglycemia, with long-term current use of insulin ([facility] ) 2016    COPD exacerbation ([facility] ) 2016    Obesity (BMI 30-39 9) 2016    HLD (hyperlipidemia) 2016      LOS (days): 2  Geometric Mean LOS (GMLOS) (days): 3 10  Days to GMLOS:0 9     OBJECTIVE:  PATIENT READMITTED TO HOSPITAL  Risk of Unplanned Readmission Score: 34         Current admission status: Inpatient  Referral Reason: VNA (VNA/DME/ post DC planning)    Preferred Pharmacy:   [facility], [facility]  [address] [place] [address]  Phone: [phone] Fax: [phone]    Primary Care Provider: Anne Finley MD    Primary Insurance: [facility]  Secondary Insurance:     ASSESSMENT:  [address] [facility] Representative - Son   Primary Phone: [phone] (Mobile)               Advance Directives  Does patient have a [address]?: Yes  Does patient have Advance Directives?: Yes  Advance Directives: Power of  for health care  Primary Contact: Marcelo Varghese         Readmission Root Cause  30 Day Readmission: Yes  Who directed you to return to the hospital?: Self  Did you understand whom to contact if you had questions or problems?: Yes (" i know where to go when i have a problem")  Did you get your prescriptions before you left the hospital?: No  Reason[de-identified] Declined service,Preference for own pharmacy  Were you able to get your prescriptions filled when you left the hospital?: Yes  Did you take your medications as prescribed?: Yes  Were you able to get to your follow-up appointments?: Yes  During previous admission, was a post-acute recommendation made?: Yes  What post-acute resources were offered?: STR  Patient was readmitted due to: shortness of breath  Action Plan: offered pt VNA at [place]  pt does not qualify for Care at home program due to insurance , pt also states he wants to get a new PCP  Info link card given and offered assistance to set up appt  Patient Information  Admitted from[de-identified] Home  Mental Status: Alert  During Assessment patient was accompanied by: Not accompanied during assessment  Assessment information provided by[de-identified] Patient  Primary Caregiver: Self  Support Systems: Son (son lives with the pt)  [place] of Residence: [address] [place] [address] do you live in?: [facility] entry access options  Select all that apply : Stairs  Number of steps to enter home  : 1  Do the steps have railings?: Yes  Type of Current Residence: 2 story home  Upon entering residence, is there a bedroom on the main floor (no further steps)?: Yes  Upon entering residence, is there a bathroom on the main floor (no further steps)?: Yes  In the last 12 months, was there a time when you were not able to pay the mortgage or rent on time?: No  In the last 12 months, how many places have you lived?: 1  In the last 12 months, was there a time when you did not have a steady place to sleep or slept in a shelter (including now)?: No  Homeless/housing insecurity resource given?: N/A  Living Arrangements: Lives w/ Son  Is patient a ?: No    Activities of Daily Living Prior to Admission  Functional Status: Independent  Completes ADLs independently?: Yes  Ambulates independently?: Yes  Does patient use assisted devices?: Yes  Assisted Devices (DME) used: Walker,Straight Cane,Bedside Commode,Home Oxygen concentrator,Other (Comment) (scooter and hover round)  DME Company Name (respiratory supplies):  [employer]  O2 Rate(s): 3  Does patient currently own DME?: Yes  What DME does the patient currently own?: [employer] [place] concentrator,Other (Comment) (scooter/hover round)  Does patient have a history of Outpatient Therapy (PT/OT)?: No  Does the patient have a history of Short-Term Rehab?: No (refused last hospital admission)  Does patient have a history of HHC?: Yes ([facility])  Does patient currently have [address]?: No         Patient Information Continued  Income Source: Pension/custodial  Does patient have prescription coverage?: Yes  Within the past 12 months, you worried that your food would run out before you got the money to buy more : Never true  Within the past 12 months, the food you bought just didnt last and you didnt have money to get more : Never true  Food insecurity resource given?: N/A  Does patient receive dialysis treatments?: No  Does patient have a history of substance abuse?: No  Does patient have a history of Mental Health Diagnosis?: No         Means of Transportation  Means of Transport to Appts[de-identified] Drives Self (pt states he drives himself to MD appts and locally around [facility], pt would like MD closer to home)  In the past 12 months, has lack of transportation kept you from medical appointments or from getting medications?: No  In the past 12 months, has lack of transportation kept you from meetings, work, or from getting things needed for daily living?: No  Was application for public transport provided?: N/A        DISCHARGE DETAILS:    Discharge planning discussed with[de-identified] Patient  Freedom of Choice: Yes  Comments - Freedom of Choice: Discussed FOC with home care agencies, pt expresses he has had and would like again [facility] contacted family/caregiver?: No- see comments (Pt states he will call his son later on today)  Were Treatment Team discharge recommendations reviewed with patient/caregiver?: Yes (rec home with SN services as pt demonstrates knowledge deficit regarding diabetic management)  Did patient/caregiver verbalize understanding of patient care needs?: Yes  Were patient/caregiver advised of the risks associated with not following Treatment Team discharge recommendations?: Yes (Discussed not following safe dc plan recommendation can lead to increased risk of falls at home as well as rehospitalizations )         [address]         Is the patient interested in [facility] at discharge?: Yes  [address] requested[de-identified] [address] Name[de-identified] [facility] Provider[de-identified] PCP  Home Health Services Needed[de-identified] Diabetes Management,Oxygen Via Nasal Cannula  Oxygen LPM Ordered (if applicable based on home health services needed):: 3 LPM  Homebound Criteria Met[de-identified] Requires the Assistance of Another Person for Safe Ambulation or to Leave the Home,Uses an Assist Device (i e  cane, walker, etc)  Supporting Clincal Findings[de-identified] Limited Endurance,Requires Oxygen,Fatigues Easliy in [employer]    DME Referral Provided  Referral made for DME?: No    Other Referral/Resources/Interventions Provided:  Interventions: C  Referral Comments: met with the patient at bedside, discussed reshospitalizations and how CM can offer assistance with home supports  Pt agreeable to VNA but not STR  Pt agreeable to Care at home program but does not have insurnace that is acceptable for the program  Pt states he has had and would like again [facility]  Pt demonstrates knowledge deficits with diabetic diet and management as well as symptom reporting to his PCP of his COPD  Pt mentions he wants to find an new PCP closer to home and prefers it to be through [address] [place] [address]  Infor link card and handout for [facility] family docs in the area provided at [facility]  This CM offered pt assistance with calling to set up appt, pt deferred for today  but pt agrreeable to [facility] for VNA, referral placed on [facility]  Pt receives MOW daily at home  Would you like to participate in our [address] service program?  : No - Declined    Treatment Team Recommendation: Home with [address] [employer] (Referrals placed to [facility] on [address])  Discharge Destination Plan[de-identified] Home with [place] at Discharge : Family (pt states his son Demar Pastrana will pick him up at [place])                                      Additional Comments: Pt denies mental halth problems or issues or inpatint psych stays  pt denies drug or alcohol abuse , Pt states he drives locally and to MD appts and this is not a barrier to keeping appts or that that is a problem  pt does not like his PCP and wants to change  Infor link and information provided for [facility] family docs in the area  Pt is on oxygen 3 liters at home through [facility]  Pt denies falls in the home and states he is independent with ADL's  Pt states he receives MOW 5 days a week  Pt is covid vaccinated   Pt does not qualify for Care at home program due to his insurance

## 2022-02-23 NOTE — ASSESSMENT & PLAN NOTE
· Patient came in with new onset left chest wall pain that was more than 24 hours old and is worsening throughout the night  He is alone at night  His son works night shift  This was non-cardiac and more pleuritic or muscular in nature; pleurisy/costrochondriotis in relation to severe COPD  · Noted the patient also has worsening hypoxia from 3 L O2 with saturation of 96 to 97% currently he is back to his baseline  · Patient is also due for CT scan of the chest for follow-up adeno carcinoma of the right lung (see note of January 30, 2022 discharge summary); patient has not had such study done  Emergency room ordered for CTA chest PE protocol- reviewed results with patient- possible bronchiolitis contributing to symptoms   · Pulmonary consult appreciated   · Pain control:  Geriatric pain protocol

## 2022-02-23 NOTE — CASE MANAGEMENT
Case Management Discharge Planning Note    Patient name Candice Platt  Location /-38 MRN [id number]  : 1945 Date 2022       Current Admission Date: 2022  Current Admission Diagnosis:Left-sided chest wall pain   Patient Active Problem List    Diagnosis Date Noted    Left-sided chest wall pain 2022    CKD (chronic kidney disease) stage 3, GFR 30-59 ml/min [facility] ) 2022    History of prostate cancer 2022    UTI (urinary tract infection) 2022    Adenocarcinoma of lung ([facility] ) 2022    Fracture of navicular bone of left foot 2021    Chronic respiratory failure with hypoxia ([facility] ) 04/15/2021    Obesity, morbid ([facility] ) 2021    PAD (peripheral artery disease) ([facility] ) 2021    History of lung cancer 2020    DDD (degenerative disc disease), lumbar 2020    Anemia, iron deficiency 2020    Lung nodule 2020    Pulmonary hypertension ([facility] ) 2019    JACQUELINE (obstructive sleep apnea) 2019    COPD with acute exacerbation ([facility] ) 2019    Oxygen dependent 2018    RBBB 2018    CAD (coronary artery disease) 2018    Chronic diastolic heart failure ([facility] ) 2018    Pleural effusion on right 10/31/2017    COPD, severe ([facility] ) 2017    Diabetic neuropathy ([facility] ) 2017    Essential hypertension 2016    Venous stasis dermatitis of both lower extremities 11/15/2016    Type 2 diabetes mellitus with hyperglycemia, with long-term current use of insulin ([facility] ) 2016    COPD exacerbation ([facility] ) 2016    Obesity (BMI 30-39 9) 2016    HLD (hyperlipidemia) 2016      LOS (days): 2  Geometric Mean LOS (GMLOS) (days): 3 10  Days to GMLOS:0 8     OBJECTIVE:  Risk of Unplanned Readmission Score: 33         Current admission status: Inpatient   Preferred Pharmacy:   [facility], [address] [address]  Phone: [phone] Fax: [phone]    Primary Care Provider: Rao Harrington MD    Primary Insurance: [facility]  Secondary Insurance:     DISCHARGE DETAILS:               Other Referral/Resources/Interventions Provided:  Interventions: C  Referral Comments: Not accepted by [facility] due to pt "non adherence to POC/teaching"  attempted to call pt x2 at [phone] without answer and mailbox full

## 2022-02-23 NOTE — DISCHARGE SUMMARY
[facility]  Discharge- Grady Damico Sr  1945, 78 y o  male MRN: [id number]  Unit/Bed#: -01 Encounter: [id number]  Primary Care Provider: Rao Harrington MD   Date and time admitted to hospital: 2/21/2022  2:45 AM    * Left-sided chest wall pain  Assessment & Plan  · Patient came in with new onset left chest wall pain that was more than 24 hours old and is worsening throughout the night  He is alone at night  His son works night shift  This was non-cardiac and more pleuritic or muscular in nature; pleurisy/costrochondriotis in relation to severe COPD  · Noted the patient also has worsening hypoxia from 3 L O2 with saturation of 96 to 97% currently he is back to his baseline  · Patient is also due for CT scan of the chest for follow-up adeno carcinoma of the right lung (see note of January 30, 2022 discharge summary); patient has not had such study done  Emergency room ordered for CTA chest PE protocol- reviewed results with patient- possible bronchiolitis contributing to symptoms   · Pulmonary consult appreciated   · Pain control:  Geriatric pain protocol  COPD with acute exacerbation ([facility] )  Assessment & Plan  · Patient is noted to have worse hypoxia with current use of 3 L O2 (is on home O2 3 L) noted saturation from usual 96 to 97 currently at 92  · Was given prednisone 40 mg daily X3, then stopped   · Continue Xopenex inhalation as needed  · On Brio Ellipta 100/251 puff daily  · Also continue Atrovent t i d  · Pulmonary consult appreciated     Type 2 diabetes mellitus with hyperglycemia, with long-term current use of insulin [facility])  Assessment & Plan  Lab Results   Component Value Date    HGBA1C 8 8 (H) 02/21/2022     · On insulin regular  45 units twice daily with insulin sliding scale    · BS hyperglycemic due to steroids, expect they will lower as he gets home and the steroids are completed (he had 3 days already, no longer indicated)    Blood Sugar Average: Last 72 hrs:  (P) 699 [id number]    Adenocarcinoma of lung ([facility] )  Assessment & Plan  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  · Completed SBRT to R[facility] on 12/31/20  · Last seen by radiation oncology on 9/14/21  · He had CTA study on July 23, 2021 - "Stable right lung volume loss secondary to areas of rounded atelectasis at the right middle and lower lobes   Small/moderate size right pleural effusion "  · CXR this admission - "Stable right lung volume loss with known right lung base round atelectasis and loculated pleural effusion "  · Recommended to have repeat CT chest January 2022 and f/u as outpatient- this was done and did not show any acute findings   · Continue to recommend outpatient f/u        Pleural effusion on right  Assessment & Plan  · According to previous reports this has been stable  · No evidence on CT     CKD (chronic kidney disease) stage 3, GFR 30-59 ml/min [facility])  Assessment & Plan  Lab Results   Component Value Date    EGFR 41 02/22/2022    EGFR 40 02/21/2022    EGFR 50 01/30/2022    CREATININE 1 62 (H) 02/22/2022    CREATININE 1 64 (H) 02/21/2022    CREATININE 1 37 (H) 01/30/2022   · creatinine is 1 62 at last check  · Follow up as outpatient   · Avoid hypotension as able         Medical Problems             Resolved Problems  Date Reviewed: 2/23/2022    None              Discharging Physician / Practitioner: Shukri Dacosta  PCP: Shirley Vega MD  Admission Date:   Admission Orders (From admission, onward)     Ordered        02/21/22 0432  Inpatient Admission  Once                      Discharge Date: 02/23/22    Consultations During Hospital Stay:  · Pulmonary     Procedures Performed:   · CT scan of the chest was done and showed possible bronchiolitis and no PE    Significant Findings / Test Results:   · None    Incidental Findings:   · None     Test Results Pending at Discharge (will require follow up): · None     Outpatient Tests Requested:  · None    Complications:  None    Reason for Admission:  Left-sided chest pain    Hospital Course:   Liane Pierce  is a 78 y o  male patient who originally presented to the hospital on 2/21/2022 due to left-sided chest pain  Is likely more related to pleuritic pain and noncardiac  Started on steroids antibiotics and Pulmonary was consulted  He did well with that treatment and was able to go home with the care of his son  VNA was arranged for him  Please see above list of diagnoses and related plan for additional information  Condition at Discharge: stable    Discharge Day Visit / Exam:   Subjective:  Patient states he feels better today  Offers no complaints of shortness of breath and feels as though he is able to go home  No nausea or vomiting  On his usual amount of oxygen  He is ready to go home  Vitals: Blood Pressure: 133/74 (02/23/22 0718)  Pulse: 67 (02/23/22 0718)  Temperature: 97 5 °F (36 4 °C) (02/23/22 0718)  Temp Source: Oral (02/22/22 2219)  Respirations: 20 (02/23/22 0718)  Height: 6' (182 9 cm) (02/21/22 1500)  Weight - Scale: 100 kg (220 lb 10 9 oz) (02/22/22 0552)  SpO2: 100 % (02/23/22 0718)  Exam:   Physical Exam  HENT:      Head: Normocephalic  Mouth/Throat:      Mouth: Mucous membranes are moist    Cardiovascular:      Rate and Rhythm: Normal rate and regular rhythm  Pulses: Normal pulses  Heart sounds: Normal heart sounds  Pulmonary:      Effort: Pulmonary effort is normal  No respiratory distress  Breath sounds: Normal breath sounds  Abdominal:      General: Abdomen is flat  Palpations: Abdomen is soft  Musculoskeletal:         General: Normal range of motion  Cervical back: Normal range of motion  Skin:     General: Skin is warm and dry  Neurological:      General: No focal deficit present  Mental Status: He is alert and oriented to person, place, and time  Psychiatric:         Mood and Affect: Mood normal          Behavior: Behavior normal           Discussion with Family: Patient declined call to   Discharge instructions/Information to patient and family:   See after visit summary for information provided to patient and family  Provisions for Follow-Up Care:  See after visit summary for information related to follow-up care and any pertinent home health orders  Disposition:   Home    Planned Readmission:  None anticipated     Discharge Statement:  I spent 35 minutes discharging the patient  This time was spent on the day of discharge  I had direct contact with the patient on the day of discharge  Greater than 50% of the total time was spent examining patient, answering all patient questions, arranging and discussing plan of care with patient as well as directly providing post-discharge instructions  Additional time then spent on discharge activities  Discharge Medications:  See after visit summary for reconciled discharge medications provided to patient and/or family        **Please Note: This note may have been constructed using a voice recognition system**

## 2022-02-25 ENCOUNTER — TRANSITIONAL CARE MANAGEMENT (OUTPATIENT)
Dept: FAMILY MEDICINE CLINIC | Facility: CLINIC | Age: 77
End: 2022-02-25

## 2022-03-07 ENCOUNTER — TELEPHONE (OUTPATIENT)
Dept: NEPHROLOGY | Facility: CLINIC | Age: 77
End: 2022-03-07

## 2022-03-07 NOTE — TELEPHONE ENCOUNTER
Appointment Confirmation   Person confirmed appointment with  If not patient, name of the person n/a    Date and time of appointment 03/08   Patient acknowledged and will be at appointment? no   Did you advise the patient that they will need a urine sample if they are a new patient? no    Did you advise the patient to bring their current medications for verification? (including any OTC) no   Additional Information  patient is not accepting new calls

## 2022-03-14 ENCOUNTER — TELEPHONE (OUTPATIENT)
Dept: NEPHROLOGY | Facility: CLINIC | Age: 77
End: 2022-03-14

## 2022-03-14 NOTE — TELEPHONE ENCOUNTER
Tried to call patient to reschedule his appointment from 03/08, but the patient is not accepting new calls  Will be sending letter

## 2022-03-29 ENCOUNTER — APPOINTMENT (EMERGENCY)
Dept: RADIOLOGY | Facility: HOSPITAL | Age: 77
End: 2022-03-29
Payer: COMMERCIAL

## 2022-03-29 ENCOUNTER — HOSPITAL ENCOUNTER (EMERGENCY)
Facility: HOSPITAL | Age: 77
Discharge: HOME | End: 2022-03-29
Attending: EMERGENCY MEDICINE
Payer: COMMERCIAL

## 2022-03-29 VITALS
HEART RATE: 82 BPM | OXYGEN SATURATION: 98 % | DIASTOLIC BLOOD PRESSURE: 88 MMHG | RESPIRATION RATE: 22 BRPM | SYSTOLIC BLOOD PRESSURE: 153 MMHG | TEMPERATURE: 97.4 F

## 2022-03-29 DIAGNOSIS — N39.0 UTI (URINARY TRACT INFECTION): Primary | ICD-10-CM

## 2022-03-29 DIAGNOSIS — R11.0 NAUSEA: ICD-10-CM

## 2022-03-29 DIAGNOSIS — R25.1 TREMOR: ICD-10-CM

## 2022-03-29 LAB
2HR DELTA HS TROPONIN: 0 NG/L
ALBUMIN SERPL BCP-MCNC: 3.3 G/DL (ref 3.5–5)
ALP SERPL-CCNC: 97 U/L (ref 46–116)
ALT SERPL W P-5'-P-CCNC: 17 U/L (ref 12–78)
ANION GAP SERPL CALCULATED.3IONS-SCNC: 2 MMOL/L (ref 4–13)
AST SERPL W P-5'-P-CCNC: 13 U/L (ref 5–45)
BACTERIA UR QL AUTO: ABNORMAL /HPF
BASOPHILS # BLD AUTO: 0.03 THOUSANDS/ΜL (ref 0–0.1)
BASOPHILS NFR BLD AUTO: 0 % (ref 0–1)
BILIRUB SERPL-MCNC: 0.34 MG/DL (ref 0.2–1)
BILIRUB UR QL STRIP: NEGATIVE
BUN SERPL-MCNC: 24 MG/DL (ref 5–25)
CALCIUM ALBUM COR SERPL-MCNC: 9.6 MG/DL (ref 8.3–10.1)
CALCIUM SERPL-MCNC: 9 MG/DL (ref 8.3–10.1)
CARDIAC TROPONIN I PNL SERPL HS: 12 NG/L
CARDIAC TROPONIN I PNL SERPL HS: 12 NG/L
CHLORIDE SERPL-SCNC: 100 MMOL/L (ref 100–108)
CLARITY UR: ABNORMAL
CO2 SERPL-SCNC: 32 MMOL/L (ref 21–32)
COLOR UR: YELLOW
CREAT SERPL-MCNC: 1.44 MG/DL (ref 0.6–1.3)
D DIMER PPP FEU-MCNC: 0.98 UG/ML FEU
EOSINOPHIL # BLD AUTO: 0.05 THOUSAND/ΜL (ref 0–0.61)
EOSINOPHIL NFR BLD AUTO: 1 % (ref 0–6)
ERYTHROCYTE [DISTWIDTH] IN BLOOD BY AUTOMATED COUNT: 15.7 % (ref 11.6–15.1)
GFR SERPL CREATININE-BSD FRML MDRD: 47 ML/MIN/1.73SQ M
GLUCOSE SERPL-MCNC: 245 MG/DL (ref 65–140)
GLUCOSE UR STRIP-MCNC: NEGATIVE MG/DL
HCT VFR BLD AUTO: 39.1 % (ref 36.5–49.3)
HGB BLD-MCNC: 12 G/DL (ref 12–17)
HGB UR QL STRIP.AUTO: ABNORMAL
IMM GRANULOCYTES # BLD AUTO: 0.06 THOUSAND/UL (ref 0–0.2)
IMM GRANULOCYTES NFR BLD AUTO: 1 % (ref 0–2)
KETONES UR STRIP-MCNC: NEGATIVE MG/DL
LEUKOCYTE ESTERASE UR QL STRIP: ABNORMAL
LIPASE SERPL-CCNC: 170 U/L (ref 73–393)
LYMPHOCYTES # BLD AUTO: 1.18 THOUSANDS/ΜL (ref 0.6–4.47)
LYMPHOCYTES NFR BLD AUTO: 11 % (ref 14–44)
MCH RBC QN AUTO: 24.8 PG (ref 26.8–34.3)
MCHC RBC AUTO-ENTMCNC: 30.7 G/DL (ref 31.4–37.4)
MCV RBC AUTO: 81 FL (ref 82–98)
MONOCYTES # BLD AUTO: 0.61 THOUSAND/ΜL (ref 0.17–1.22)
MONOCYTES NFR BLD AUTO: 6 % (ref 4–12)
MUCOUS THREADS UR QL AUTO: ABNORMAL
NEUTROPHILS # BLD AUTO: 8.49 THOUSANDS/ΜL (ref 1.85–7.62)
NEUTS SEG NFR BLD AUTO: 81 % (ref 43–75)
NITRITE UR QL STRIP: POSITIVE
NON-SQ EPI CELLS URNS QL MICRO: ABNORMAL /HPF
NRBC BLD AUTO-RTO: 0 /100 WBCS
PH UR STRIP.AUTO: 5.5 [PH] (ref 4.5–8)
PLATELET # BLD AUTO: 185 THOUSANDS/UL (ref 149–390)
PMV BLD AUTO: 9.8 FL (ref 8.9–12.7)
POTASSIUM SERPL-SCNC: 4.4 MMOL/L (ref 3.5–5.3)
PROT SERPL-MCNC: 8.4 G/DL (ref 6.4–8.2)
PROT UR STRIP-MCNC: ABNORMAL MG/DL
RBC # BLD AUTO: 4.84 MILLION/UL (ref 3.88–5.62)
RBC #/AREA URNS AUTO: ABNORMAL /HPF
SODIUM SERPL-SCNC: 134 MMOL/L (ref 136–145)
SP GR UR STRIP.AUTO: >=1.03 (ref 1–1.03)
UROBILINOGEN UR QL STRIP.AUTO: 0.2 E.U./DL
WBC # BLD AUTO: 10.42 THOUSAND/UL (ref 4.31–10.16)
WBC #/AREA URNS AUTO: ABNORMAL /HPF
WBC CLUMPS # UR AUTO: PRESENT /UL

## 2022-03-29 PROCEDURE — G1004 CDSM NDSC: HCPCS

## 2022-03-29 PROCEDURE — 87186 SC STD MICRODIL/AGAR DIL: CPT

## 2022-03-29 PROCEDURE — 83690 ASSAY OF LIPASE: CPT | Performed by: EMERGENCY MEDICINE

## 2022-03-29 PROCEDURE — 96375 TX/PRO/DX INJ NEW DRUG ADDON: CPT

## 2022-03-29 PROCEDURE — 85379 FIBRIN DEGRADATION QUANT: CPT | Performed by: EMERGENCY MEDICINE

## 2022-03-29 PROCEDURE — 96365 THER/PROPH/DIAG IV INF INIT: CPT

## 2022-03-29 PROCEDURE — 87077 CULTURE AEROBIC IDENTIFY: CPT

## 2022-03-29 PROCEDURE — 80053 COMPREHEN METABOLIC PANEL: CPT | Performed by: EMERGENCY MEDICINE

## 2022-03-29 PROCEDURE — 85025 COMPLETE CBC W/AUTO DIFF WBC: CPT | Performed by: EMERGENCY MEDICINE

## 2022-03-29 PROCEDURE — 93005 ELECTROCARDIOGRAM TRACING: CPT

## 2022-03-29 PROCEDURE — 70450 CT HEAD/BRAIN W/O DYE: CPT

## 2022-03-29 PROCEDURE — 81001 URINALYSIS AUTO W/SCOPE: CPT

## 2022-03-29 PROCEDURE — 84484 ASSAY OF TROPONIN QUANT: CPT | Performed by: EMERGENCY MEDICINE

## 2022-03-29 PROCEDURE — 74177 CT ABD & PELVIS W/CONTRAST: CPT

## 2022-03-29 PROCEDURE — 71275 CT ANGIOGRAPHY CHEST: CPT

## 2022-03-29 PROCEDURE — 99285 EMERGENCY DEPT VISIT HI MDM: CPT | Performed by: EMERGENCY MEDICINE

## 2022-03-29 PROCEDURE — 36415 COLL VENOUS BLD VENIPUNCTURE: CPT | Performed by: EMERGENCY MEDICINE

## 2022-03-29 PROCEDURE — 99285 EMERGENCY DEPT VISIT HI MDM: CPT

## 2022-03-29 PROCEDURE — 87086 URINE CULTURE/COLONY COUNT: CPT

## 2022-03-29 RX ORDER — ONDANSETRON 2 MG/ML
4 INJECTION INTRAMUSCULAR; INTRAVENOUS ONCE
Status: COMPLETED | OUTPATIENT
Start: 2022-03-29 | End: 2022-03-29

## 2022-03-29 RX ORDER — CEPHALEXIN 500 MG/1
500 CAPSULE ORAL EVERY 12 HOURS SCHEDULED
Qty: 10 CAPSULE | Refills: 0 | Status: SHIPPED | OUTPATIENT
Start: 2022-03-29 | End: 2022-04-03

## 2022-03-29 RX ADMIN — ONDANSETRON 4 MG: 2 INJECTION INTRAMUSCULAR; INTRAVENOUS at 15:49

## 2022-03-29 RX ADMIN — IOHEXOL 100 ML: 350 INJECTION, SOLUTION INTRAVENOUS at 18:17

## 2022-03-29 RX ADMIN — CEFTRIAXONE SODIUM 2000 MG: 10 INJECTION, POWDER, FOR SOLUTION INTRAVENOUS at 19:45

## 2022-03-29 NOTE — ED ATTENDING ATTESTATION
Final Diagnosis:  1  UTI (urinary tract infection)    2  Tremor    3  Nausea      ED Course as of 03/31/22 0328   Tue Mar 29, 2022   1705 Patient does state that he "keeps going through multiple underwears" without realizing  Also mentions feeling more unstable on his feet when walking  Will do [address] for NPH  Linsey Johnson MD, saw and evaluated the patient  All available labs and X-rays were ordered by me or the resident and have been reviewed by myself  I discussed the patient with the resident / non-physician and agree with the resident's / non-physician practitioner's findings and plan as documented in the resident's / non-physician practicitioner's note, except where noted  At this point, I agree with the current assessment done in the ED  I was present during key portions of all procedures performed unless otherwise stated  Chief Complaint   Patient presents with    Shortness of Breath     Pt states that he has a history of COPD  Stated that he regularly wears oxygen  Was not wearing upon arrival to triage  Stated that he has been feeling more short of breath  Also noted that he has been feeling shaky  This is a 78 y o  male presenting for evaluation of "tremors"  He has it baseline for years but for the last couple days it's gotten worse  He sometimes would have COPD exacerbation as a cause and it'd get better (the tremors that is)  He does SOB (despite nursing documentation)  He has a tremor so bad that he can't eat correctly  +intermittnet CP for several days  Last episode was yesterday  Not very active of a person  Non-exertional CP lasting minutes then resolves  LEFT-sided  No SOB  No dizziness  Denies nausea but "sick to stomach"  No vomiting  No belly pain  No f/ch  Denies any upper respiratory tract infection symptoms (cough, congestion, rhinorrhea, sore throat)       PMH:  COPD  CHF  Lung CA  DM   has a past medical history of Abdominal pain, MARANDA (acute kidney injury) ([facility] ) (2018), Cardiac disease, CHF (congestive heart failure) ([place] [address] ), COPD, severe ([place] [address] ), Coronary artery disease, DDD (degenerative disc disease), lumbar (2020), Diabetes mellitus ([place] [address] ), Dyspnea, GERD (gastroesophageal reflux disease), Hyperlipidemia, Hypertension, MI (myocardial infarction) ([place] [address] ), Nodule of apex of right lung, JACQUELINE (obstructive sleep apnea), and Prostate cancer ([place] [address] )  PSH:   has a past surgical history that includes Prostate surgery; Abdominal surgery; Angioplasty; Esophagogastroduodenoscopy (N/A, 10/2/2017); Appendectomy; Skin graft; Knee cartilage surgery; Other surgical history; Colonoscopy (); IR thoracentesis (11/3/2020); and CT needle biopsy lung (11/3/2020)  Social:  Social History     Substance and Sexual Activity   Alcohol Use Not Currently     Social History     Tobacco Use   Smoking Status Former Smoker    Packs/day: 1 50    Years: 50 00    Pack years: 75 00    Start date: 38    Quit date: 2020    Years since quittin 7   Smokeless Tobacco Never Used     Social History     Substance and Sexual Activity   Drug Use No     PE:  Vitals:    22 1522 22 1900   BP: (!) 193/84 153/88   BP Location:  Right arm   Pulse: 77 82   Resp: (!) 24 22   Temp: (!) 97 4 °F (36 3 °C)    TempSrc: Oral    SpO2: 94% 98%   General: VSS, NAD, awake, alert  Well-nourished, well-developed  Appears stated age  Head: Normocephalic, atraumatic, nontender  Eyes: PERRL, EOM-I  No diplopia  No hyphema  No subconjunctival hemorrhages  Symmetrical lids  ENTAtraumatic external nose and ears  MMM  No stridor  Normal phonation  No drooling  Base of mouth is soft  No mastoid tenderness  Neck: Symmetric, trachea midline  No JVD  CV: HR 100s   Peripheral pulses +2 throughout  No chest wall tenderness  Lungs:   Mildly labored while on 3L NC (chronic)  No retractions  No crepitus  No tachypnea  No paradoxical motion    Abd: +BS, soft, RUQ / LLQ midl tenderness  ND    redicuble imbilical hernia  No LE edema  Chronic venous stasis changesd   MSK:   FROM   No lower extremity edema  Back:   No CVAT  Skin: Dry, intact  Neuro: AAOx3, GCS 15, CN II-XII grossly intact  Motor grossly intact  Psychiatric/Behavioral: Appropriate mood and affect   Exam: deferred  A:  - UTI?  - abnormal gait / urinary symptoms  - SOB  P:  - CT AP  - CXR  - Cardiac workup  -   - 13 point ROS was performed and all are normal unless stated in the history above  - Nursing note reviewed  Vitals reviewed  - Orders placed by myself and/or advanced practitioner / resident     - Previous chart was reviewed  - No language barrier    - History obtained from patient  - There are no limitations to the history obtained  - Critical care time: Not applicable for this patient  Code Status: Prior  Advance Directive and Living Will:      Power of :    POLST:      Medications   ondansetron (ZOFRAN) injection 4 mg (4 mg Intravenous Given 3/29/22 1549)   iohexol (OMNIPAQUE) 350 MG/ML injection (SINGLE-DOSE) 100 mL (100 mL Intravenous Given 3/29/22 1817)   cefTRIAXone (ROCEPHIN) 2,000 mg in dextrose 5 % 50 mL IVPB (0 mg Intravenous Stopped 3/29/22 2015)     PE Study with CT Abdomen and Pelvis with contrast   Final Result      No acute findings in the chest  Chronic unchanged right lung volume loss and right lower lobe round atelectasis with a chronic small right basilar pleural effusion  No pulmonary arterial embolism  No acute inflammatory changes in the abdomen or the pelvis  Workstation performed: [id number]         CT head without contrast   Final Result      No acute intracranial abnormality                    Workstation performed: [id number]           Orders Placed This Encounter   Procedures    Urine culture    PE Study with CT Abdomen and Pelvis with contrast    CT head without contrast    CBC and differential    Comprehensive metabolic panel    Lipase HS Troponin 0hr (reflex protocol)    D-Dimer    HS Troponin I 2hr    Urine Microscopic    ECG 12 lead    ECG 12 lead     Labs Reviewed   CBC AND DIFFERENTIAL - Abnormal       Result Value Ref Range Status    WBC 10 42 (*) 4 31 - 10 16 Thousand/uL Final    RBC 4 84  3 88 - 5 62 Million/uL Final    Hemoglobin 12 0  12 0 - 17 0 g/dL Final    Hematocrit 39 1  36 5 - 49 3 % Final    MCV 81 (*) 82 - 98 fL Final    MCH 24 8 (*) 26 8 - 34 3 pg Final    MCHC 30 7 (*) 31 4 - 37 4 g/dL Final    RDW 15 7 (*) 11 6 - 15 1 % Final    MPV 9 8  8 9 - 12 7 fL Final    Platelets 121  762 - 390 Thousands/uL Final    nRBC 0  /100 WBCs Final    Neutrophils Relative 81 (*) 43 - 75 % Final    Immat GRANS % 1  0 - 2 % Final    Lymphocytes Relative 11 (*) 14 - 44 % Final    Monocytes Relative 6  4 - 12 % Final    Eosinophils Relative 1  0 - 6 % Final    Basophils Relative 0  0 - 1 % Final    Neutrophils Absolute 8 49 (*) 1 85 - 7 62 Thousands/µL Final    Immature Grans Absolute 0 06  0 00 - 0 20 Thousand/uL Final    Lymphocytes Absolute 1 18  0 60 - 4 47 Thousands/µL Final    Monocytes Absolute 0 61  0 17 - 1 22 Thousand/µL Final    Eosinophils Absolute 0 05  0 00 - 0 61 Thousand/µL Final    Basophils Absolute 0 03  0 00 - 0 10 Thousands/µL Final   COMPREHENSIVE METABOLIC PANEL - Abnormal    Sodium 134 (*) 136 - 145 mmol/L Final    Potassium 4 4  3 5 - 5 3 mmol/L Final    Chloride 100  100 - 108 mmol/L Final    CO2 32  21 - 32 mmol/L Final    ANION GAP 2 (*) 4 - 13 mmol/L Final    BUN 24  5 - 25 mg/dL Final    Creatinine 1 44 (*) 0 60 - 1 30 mg/dL Final    Comment: Standardized to IDMS reference method    Glucose 245 (*) 65 - 140 mg/dL Final    Comment: If the patient is fasting, the ADA then defines impaired fasting glucose as > 100 mg/dL and diabetes as > or equal to 123 mg/dL  Specimen collection should occur prior to Sulfasalazine administration due to the potential for falsely depressed results   Specimen collection should occur prior to Sulfapyridine administration due to the potential for falsely elevated results  Calcium 9 0  8 3 - 10 1 mg/dL Final    Corrected Calcium 9 6  8 3 - 10 1 mg/dL Final    AST 13  5 - 45 U/L Final    Comment: Specimen collection should occur prior to Sulfasalazine administration due to the potential for falsely depressed results  ALT 17  12 - 78 U/L Final    Comment: Specimen collection should occur prior to Sulfasalazine and/or Sulfapyridine administration due to the potential for falsely depressed results  Alkaline Phosphatase 97  46 - 116 U/L Final    Total Protein 8 4 (*) 6 4 - 8 2 g/dL Final    Albumin 3 3 (*) 3 5 - 5 0 g/dL Final    Total Bilirubin 0 34  0 20 - 1 00 mg/dL Final    Comment: Use of this assay is not recommended for patients undergoing treatment with eltrombopag due to the potential for falsely elevated results  eGFR 47  ml/min/1 73sq m Final    Narrative:     [place] guidelines for Chronic Kidney Disease (CKD):     Stage 1 with normal or high GFR (GFR > 90 mL/min/1 73 square meters)    Stage 2 Mild CKD (GFR = 60-89 mL/min/1 73 square meters)    Stage 3A Moderate CKD (GFR = 45-59 mL/min/1 73 square meters)    Stage 3B Moderate CKD (GFR = 30-44 mL/min/1 73 square meters)    Stage 4 Severe CKD (GFR = 15-29 mL/min/1 73 square meters)    Stage 5 End Stage CKD (GFR <15 mL/min/1 73 square meters)  Note: GFR calculation is accurate only with a steady state creatinine   D-DIMER, QUANTITATIVE - Abnormal    D-Dimer, Quant 0 98 (*) <0 50 ug/ml FEU Final    Comment: Reference and upper limits to exclude DVT and PE are the same  Do not use to exclude if clinical symptoms are present  Narrative:      In the evaluation for possible pulmonary embolism, in the appropriate (Well's Score of 4 or less) patient, the age adjusted d-dimer cutoff for this patient can be calculated as:    Age x 0 01 (in ug/mL) for Age-adjusted D-dimer exclusion threshold for a patient over 50 years  URINE MICROSCOPIC - Abnormal    RBC, UA 1-2  None Seen, 1-2 /hpf Final    WBC, UA Innumerable (*) None Seen, 1-2 /hpf Final    Epithelial Cells Occasional  None Seen, Occasional /hpf Final    Bacteria, UA Innumerable (*) None Seen, Occasional /hpf Final    MUCUS THREADS Moderate (*) None Seen Final    WBC Clumps PRESENT   Final   URINE MACROSCOPIC, POC - Abnormal    Color, UA Yellow   Final    Clarity, UA Cloudy   Final    pH, UA 5 5  4 5 - 8 0 Final    Leukocytes, UA Small (*) Negative Final    Nitrite, UA Positive (*) Negative Final    Protein,  (2+) (*) Negative mg/dl Final    Glucose, UA Negative  Negative mg/dl Final    Ketones, UA Negative  Negative mg/dl Final    Urobilinogen, UA 0 2  0 2, 1 0 E U /dl E U /dl Final    Bilirubin, UA Negative  Negative Final    Blood, UA Small (*) Negative Final    Specific Gravity, UA >=1 030  1 003 - 1 030 Final    Narrative:     CLINITEK RESULT   LIPASE - Normal    Lipase 170  73 - 393 u/L Final   HS TROPONIN I 0HR - Normal    hs TnI 0hr 12  "Refer to ACS Flowchart"- see link ng/L Final    Comment:                                              Initial (time 0) result  If >=50 ng/L, Myocardial injury suggested ;  Type of myocardial injury and treatment strategy  to be determined  If 5-49 ng/L, a delta result at 2 hours and or 4 hours will be needed to further evaluate  If <4 ng/L, and chest pain has been >3 hours since onset, patient may qualify for discharge based on the HEART score in the ED  If <5 ng/L and <3hours since onset of chest pain, a delta result at 2 hours will be needed to further evaluate  HS Troponin 99th Percentile URL of a Health Population=12 ng/L with a 95% Confidence Interval of 8-18 ng/L  Second Troponin (time 2 hours)  If calculated delta >= 20 ng/L,  Myocardial injury suggested ; Type of myocardial injury and treatment strategy to be determined    If 5-49 ng/L and the calculated delta is 5-19 ng/L, consult medical service for evaluation  Continue evaluation for ischemia on ecg and other possible etiology and repeat hs troponin at 4 hours  If delta is <5 ng/L at 2 hours, consider discharge based on risk stratification via the HEART score (if in ED), or MIKKI risk score in IP/Observation  HS Troponin 99th Percentile URL of a Health Population=12 ng/L with a 95% Confidence Interval of 8-18 ng/L    HS TROPONIN I 2HR - Normal    hs TnI 2hr 12  "Refer to ACS Flowchart"- see link ng/L Final    Comment:                                              Initial (time 0) result  If >=50 ng/L, Myocardial injury suggested ;  Type of myocardial injury and treatment strategy  to be determined  If 5-49 ng/L, a delta result at 2 hours and or 4 hours will be needed to further evaluate  If <4 ng/L, and chest pain has been >3 hours since onset, patient may qualify for discharge based on the HEART score in the ED  If <5 ng/L and <3hours since onset of chest pain, a delta result at 2 hours will be needed to further evaluate  HS Troponin 99th Percentile URL of a Health Population=12 ng/L with a 95% Confidence Interval of 8-18 ng/L  Second Troponin (time 2 hours)  If calculated delta >= 20 ng/L,  Myocardial injury suggested ; Type of myocardial injury and treatment strategy to be determined  If 5-49 ng/L and the calculated delta is 5-19 ng/L, consult medical service for evaluation  Continue evaluation for ischemia on ecg and other possible etiology and repeat hs troponin at 4 hours  If delta is <5 ng/L at 2 hours, consider discharge based on risk stratification via the HEART score (if in ED), or MIKKI risk score in IP/Observation  HS Troponin 99th Percentile URL of a Health Population=12 ng/L with a 95% Confidence Interval of 8-18 ng/L      Delta 2hr hsTnI 0  <20 ng/L Final     Time reflects when diagnosis was documented in both MDM as applicable and the Disposition within this note       Time User Action Codes Description Comment    3/29/2022 7:26 PM Sarai Leonard [N39 0] UTI (urinary tract infection)     3/29/2022  7:26 PM Sarai Hubbard Add [R25 1] Tremor     3/29/2022  7:26 PM Sarai Hubbard Add [R11 0] Nausea           ED Disposition       ED Disposition Condition Date/Time Comment    Discharge Stable Tue Mar 29, 2022  7:26 PM Cruz Alexander Sr  discharge to home/self care  Follow-up Information       Follow up With Specialties Details Why [address] Mathur MD Family Medicine In 2 days  [address]  [address]  [address] [place] [address]  [phone]            Discharge Medication List as of 3/29/2022  8:00 PM        START taking these medications    Details   cephalexin (KEFLEX) 500 mg capsule Take 1 capsule (500 mg total) by mouth every 12 (twelve) hours for 5 days, Starting Tue 3/29/2022, Until Sun 4/3/2022, Normal           CONTINUE these medications which have NOT CHANGED    Details   BD Insulin Syringe U-500 31G X 6MM 0 5 ML MISC USE 1 SYRINGE THREE TIMES A DAY FOR INJECTING INSULIN, Normal      cyclobenzaprine (FLEXERIL) 10 mg tablet Take 1 tablet (10 mg total) by mouth 3 (three) times a day for 10 days, Starting Wed 2/23/2022, Until Sat 3/5/2022, Normal      ferrous sulfate 325 (65 Fe) mg tablet Take 1 tablet (325 mg total) by mouth daily with breakfast, Starting Wed 2/23/2022, Normal      fluticasone-vilanterol (BREO ELLIPTA) 100-25 mcg/inh inhaler Inhale 1 puff daily Rinse mouth after use , Starting Mon 1/31/2022, Normal      furosemide (LASIX) 40 mg tablet Take 1 tablet (40 mg total) by mouth daily, Starting Sun 1/30/2022, Normal      glucose blood (OneTouch Verio) test strip May substitute brand based on insurance coverage   Check glucose QID , Normal      HUMULIN R 500 UNIT/ML CONCENTRATED injection INJECT 45 UNITS TWICE DAILY BEFORE BREAKFAST AND LUNCH, Normal      metoprolol tartrate (LOPRESSOR) 25 mg tablet TAKE ONE TABLET BY MOUTH TWICE A DAY, Normal      potassium chloride (K-DUR,KLOR-CON) 20 mEq tablet Take 1 tablet (20 mEq total) by mouth daily, Starting Sun 1/30/2022, Normal      simvastatin (ZOCOR) 40 mg tablet TAKE ONE TABLET BY MOUTH EVERY DAY, Normal      Trelegy Ellipta 100-62 5-25 MCG/INH inhaler INHALE 1 PUFF BY MOUTH DAILY *RINSE MOUTH AFTER USE*, Normal           No discharge procedures on file  Prior to Admission Medications   Prescriptions Last Dose Informant Patient Reported? Taking? BD Insulin Syringe U-500 31G X 6MM 0 5 ML MISC   No No   Sig: USE 1 SYRINGE THREE TIMES A DAY FOR INJECTING INSULIN   HUMULIN R 500 UNIT/ML CONCENTRATED injection   No No   Sig: INJECT 45 UNITS TWICE DAILY BEFORE BREAKFAST AND LUNCH   Trelegy Ellipta 100-62 5-25 MCG/INH inhaler   No No   Sig: INHALE 1 PUFF BY MOUTH DAILY *RINSE MOUTH AFTER USE*   cyclobenzaprine (FLEXERIL) 10 mg tablet   No No   Sig: Take 1 tablet (10 mg total) by mouth 3 (three) times a day for 10 days   ferrous sulfate 325 (65 Fe) mg tablet   No No   Sig: Take 1 tablet (325 mg total) by mouth daily with breakfast   fluticasone-vilanterol (BREO ELLIPTA) 100-25 mcg/inh inhaler   No No   Sig: Inhale 1 puff daily Rinse mouth after use  furosemide (LASIX) 40 mg tablet   No No   Sig: Take 1 tablet (40 mg total) by mouth daily   glucose blood (OneTouch Verio) test strip   No No   Sig: May substitute brand based on insurance coverage  Check glucose QID  metoprolol tartrate (LOPRESSOR) 25 mg tablet   No No   Sig: TAKE ONE TABLET BY MOUTH TWICE A DAY   potassium chloride (K-DUR,KLOR-CON) 20 mEq tablet   No No   Sig: Take 1 tablet (20 mEq total) by mouth daily   simvastatin (ZOCOR) 40 mg tablet   No No   Sig: TAKE ONE TABLET BY MOUTH EVERY DAY      Facility-Administered Medications: None       Portions of the record may have been created with voice recognition software  Occasional wrong word or "sound a like" substitutions may have occurred due to the inherent limitations of voice recognition software   Read the chart carefully and recognize, using context, where substitutions have occurred      Electronically signed by:  Lacey Sahu

## 2022-03-29 NOTE — ED PROVIDER NOTES
History  Chief Complaint   Patient presents with    Shortness of Breath     Pt states that he has a history of COPD  Stated that he regularly wears oxygen  Was not wearing upon arrival to triage  Stated that he has been feeling more short of breath  Also noted that he has been feeling shaky  72-year-old male with history of COPD on 3 L of O2 via nasal cannula at baseline, CHF, diabetes, lung cancer, hypertension, hyperlipidemia, CAD who presents for evaluation of tremors  Patient reports that he has baseline tremors which are usually manageable for him  2-3 days ago, he noticed acute worsening of his baseline tremor  He notes that this has gotten so bad that he has had difficulty eating, and that by the time he gets the fork or spoon to his mouth all of the food has fallen off  He has been able to walk normally  He states that this has happened to him in the past, however, it is generally associated with a COPD exacerbation  Furthermore on review of systems, patient notes that he has had chest pain intermittently over the past several days  He has not experienced any chest pain today  His last episode was yesterday  It lasts for several minutes before spontaneously resolving  It has no association with exertion  He also notes that he has been nauseous for the past few days but has had no episodes of vomiting  He otherwise denies shortness of breath, fevers, abdominal pain, diarrhea, cough  He has had multiple abdominal surgeries  Prior to Admission Medications   Prescriptions Last Dose Informant Patient Reported? Taking?    BD Insulin Syringe U-500 31G X 6MM 0 5 ML MISC   No No   Sig: USE 1 SYRINGE THREE TIMES A DAY FOR INJECTING INSULIN   HUMULIN R 500 UNIT/ML CONCENTRATED injection   No No   Sig: INJECT 45 UNITS TWICE DAILY BEFORE BREAKFAST AND LUNCH   Trelegy Ellipta 100-62 5-25 MCG/INH inhaler   No No   Sig: INHALE 1 PUFF BY MOUTH DAILY *RINSE MOUTH AFTER USE*   cyclobenzaprine (FLEXERIL) 10 mg tablet   No No   Sig: Take 1 tablet (10 mg total) by mouth 3 (three) times a day for 10 days   ferrous sulfate 325 (65 Fe) mg tablet   No No   Sig: Take 1 tablet (325 mg total) by mouth daily with breakfast   fluticasone-vilanterol (BREO ELLIPTA) 100-25 mcg/inh inhaler   No No   Sig: Inhale 1 puff daily Rinse mouth after use  furosemide (LASIX) 40 mg tablet   No No   Sig: Take 1 tablet (40 mg total) by mouth daily   glucose blood (OneTouch Verio) test strip   No No   Sig: May substitute brand based on insurance coverage  Check glucose QID  metoprolol tartrate (LOPRESSOR) 25 mg tablet   No No   Sig: TAKE ONE TABLET BY MOUTH TWICE A DAY   potassium chloride (K-DUR,KLOR-CON) 20 mEq tablet   No No   Sig: Take 1 tablet (20 mEq total) by mouth daily   simvastatin (ZOCOR) 40 mg tablet   No No   Sig: TAKE ONE TABLET BY MOUTH EVERY DAY      Facility-Administered Medications: None       Past Medical History:   Diagnosis Date    Abdominal pain     [facility]    Cardiac disease     CHF (congestive heart failure) (HCC)     COPD, severe (HCC)     Coronary artery disease     DDD (degenerative disc disease), lumbar 9/1/2020    [facility] mellitus [facility]     Dyspnea     GERD (gastroesophageal reflux disease)     Hyperlipidemia     Hypertension     MI (myocardial infarction) ([facility] )     with 3 stents    Nodule of apex of right lung     JACQUELINE (obstructive sleep apnea)     Prostate cancer [facility])        Past Surgical History:   Procedure Laterality Date    ABDOMINAL SURGERY      exploratory    ANGIOPLASTY      3 stents    APPENDECTOMY      COLONOSCOPY  2015    CT NEEDLE BIOPSY LUNG  11/3/2020    ESOPHAGOGASTRODUODENOSCOPY N/A 10/2/2017    Procedure: ESOPHAGOGASTRODUODENOSCOPY (EGD); Surgeon: Nuvia Nix MD;  Location: [facility];   Service: Gastroenterology    IR THORACENTESIS  11/3/2020    KNEE CARTILAGE SURGERY      OTHER SURGICAL HISTORY      stent placement    PROSTATE SURGERY      SKIN GRAFT      Basal cell CA back       Family History   Problem Relation Age of Onset    Heart disease Father     Other Father         Mesothelioma      I have reviewed and agree with the history as documented  E-Cigarette/Vaping    E-Cigarette Use Never User      E-Cigarette/Vaping Substances    Nicotine No     THC No     CBD No     Flavoring No     Other No     Unknown No      Social History     Tobacco Use    Smoking status: Former Smoker     Packs/day: 1 50     Years: 50 00     Pack years: 75 00     Start date: 38     Quit date: 2020     Years since quittin 7    Smokeless tobacco: Never Used   Vaping Use    Vaping Use: Never used   Substance Use Topics    Alcohol use: Not Currently    Drug use: No        Review of Systems   Constitutional: Negative for chills and fever  Eyes: Negative for visual disturbance  Respiratory: Negative for cough, chest tightness and shortness of breath  Cardiovascular: Positive for chest pain  Negative for leg swelling  Gastrointestinal: Positive for nausea  Negative for abdominal distention, abdominal pain, blood in stool, constipation, diarrhea and vomiting  Genitourinary: Negative for dysuria, flank pain, frequency and urgency  Musculoskeletal: Negative for back pain and gait problem  Skin: Negative for pallor and rash  Neurological: Positive for tremors  Negative for syncope, weakness, light-headedness and headaches  All other systems reviewed and are negative        Physical Exam  ED Triage Vitals   Temperature Pulse Respirations Blood Pressure SpO2   22 1522 22 1522 22 1522 22 1522 22 1522   (!) 97 4 °F (36 3 °C) 77 (!) 24 (!) 193/84 94 %      Temp Source Heart Rate Source Patient Position - Orthostatic VS BP Location FiO2 (%)   22 1522 22 1900 22 1900 22 1900 --   Oral Monitor Lying Right arm       Pain Score       22 1900       2 Orthostatic Vital Signs  Vitals:    03/29/22 1522 03/29/22 1900   BP: (!) 193/84 153/88   Pulse: 77 82   Patient Position - Orthostatic VS:  Lying       Physical Exam  Vitals and nursing note reviewed  Constitutional:       General: He is not in acute distress  HENT:      Head: Normocephalic and atraumatic  Nose: Nose normal       Mouth/Throat:      Mouth: Mucous membranes are moist    Eyes:      General: No scleral icterus  Extraocular Movements: Extraocular movements intact  Pupils: Pupils are equal, round, and reactive to light  Cardiovascular:      Rate and Rhythm: Regular rhythm  Tachycardia present  Heart sounds: No murmur heard  No friction rub  No gallop  Pulmonary:      Effort: Tachypnea present  Breath sounds: Normal breath sounds  No wheezing, rhonchi or rales  Abdominal:      General: There is no distension  Palpations: Abdomen is soft  Tenderness: There is abdominal tenderness in the right upper quadrant and left lower quadrant  There is no guarding or rebound  Musculoskeletal:         General: No swelling or tenderness  Normal range of motion  Cervical back: Normal range of motion and neck supple  Skin:     General: Skin is warm and dry  Findings: No rash  Comments: Chronic venous stasis changes noted to bilateral lower extremities  Neurological:      General: No focal deficit present  Mental Status: He is alert and oriented to person, place, and time     Psychiatric:         Behavior: Behavior normal          ED Medications  Medications   ondansetron (ZOFRAN) injection 4 mg (4 mg Intravenous Given 3/29/22 1549)   iohexol (OMNIPAQUE) 350 MG/ML injection (SINGLE-DOSE) 100 mL (100 mL Intravenous Given 3/29/22 1817)   cefTRIAXone (ROCEPHIN) 2,000 mg in dextrose 5 % 50 mL IVPB (0 mg Intravenous Stopped 3/29/22 2015)       Diagnostic Studies  Results Reviewed     Procedure Component Value Units Date/Time    HS Troponin I 2hr [[id number]]  (Normal) Collected: 03/29/22 1822    Lab Status: Final result Specimen: Blood from Arm, Left Updated: 03/29/22 1901     hs TnI 2hr 12 ng/L      Delta 2hr hsTnI 0 ng/L     Urine Microscopic [[id number]]  (Abnormal) Collected: 03/29/22 1755    Lab Status: Final result Specimen: Urine, Clean Catch Updated: 03/29/22 1820     RBC, UA 1-2 /hpf      WBC, UA Innumerable /hpf      Epithelial Cells Occasional /hpf      Bacteria, UA Innumerable /hpf      MUCUS THREADS Moderate     WBC Clumps PRESENT    Urine culture [[id number]] Collected: 03/29/22 1755    Lab Status: In process Specimen: Urine, Clean Catch Updated: 03/29/22 1820    Urine Macroscopic, POC [[id number]]  (Abnormal) Collected: 03/29/22 1755    Lab Status: Final result Specimen: Urine Updated: 03/29/22 1757     Color, UA Yellow     Clarity, UA Cloudy     pH, UA 5 5     Leukocytes, UA Small     Nitrite, UA Positive     Protein,  (2+) mg/dl      Glucose, UA Negative mg/dl      Ketones, UA Negative mg/dl      Urobilinogen, UA 0 2 E U /dl      Bilirubin, UA Negative     Blood, UA Small     Specific [place], UA >=1 030    Narrative:      CLINITEK RESULT    HS Troponin 0hr (reflex protocol) [[id number]]  (Normal) Collected: 03/29/22 1547    Lab Status: Final result Specimen: Blood from Line, Venous Updated: 03/29/22 1629     hs TnI 0hr 12 ng/L     D-Dimer [[id number]]  (Abnormal) Collected: 03/29/22 1547    Lab Status: Final result Specimen: Blood from Arm, Left Updated: 03/29/22 1625     D-Dimer, Quant 0 98 ug/ml FEU     Narrative: In the evaluation for possible pulmonary embolism, in the appropriate (Well's Score of 4 or less) patient, the age adjusted d-dimer cutoff for this patient can be calculated as:    Age x 0 01 (in ug/mL) for Age-adjusted D-dimer exclusion threshold for a patient over 50 years      Comprehensive metabolic panel [[id number]]  (Abnormal) Collected: 03/29/22 1547    Lab Status: Final result Specimen: Blood from Line, Venous Updated: 03/29/22 1623     Sodium 134 mmol/L      Potassium 4 4 mmol/L      Chloride 100 mmol/L      CO2 32 mmol/L      ANION GAP 2 mmol/L      BUN 24 mg/dL      Creatinine 1 44 mg/dL      Glucose 245 mg/dL      Calcium 9 0 mg/dL      Corrected Calcium 9 6 mg/dL      AST 13 U/L      ALT 17 U/L      Alkaline Phosphatase 97 U/L      Total Protein 8 4 g/dL      Albumin 3 3 g/dL      Total Bilirubin 0 34 mg/dL      eGFR 47 ml/min/1 73sq m     Narrative:      [place] guidelines for Chronic Kidney Disease (CKD):     Stage 1 with normal or high GFR (GFR > 90 mL/min/1 73 square meters)    Stage 2 Mild CKD (GFR = 60-89 mL/min/1 73 square meters)    Stage 3A Moderate CKD (GFR = 45-59 mL/min/1 73 square meters)    Stage 3B Moderate CKD (GFR = 30-44 mL/min/1 73 square meters)    Stage 4 Severe CKD (GFR = 15-29 mL/min/1 73 square meters)    Stage 5 End Stage CKD (GFR <15 mL/min/1 73 square meters)  Note: GFR calculation is accurate only with a steady state creatinine    Lipase [[id number]]  (Normal) Collected: 03/29/22 1547    Lab Status: Final result Specimen: Blood from Line, Venous Updated: 03/29/22 1623     Lipase 170 u/L     CBC and differential [[id number]]  (Abnormal) Collected: 03/29/22 1547    Lab Status: Final result Specimen: Blood from Line, Venous Updated: 03/29/22 1600     WBC 10 42 Thousand/uL      RBC 4 84 Million/uL      Hemoglobin 12 0 g/dL      Hematocrit 39 1 %      MCV 81 fL      MCH 24 8 pg      MCHC 30 7 g/dL      RDW 15 7 %      MPV 9 8 fL      Platelets 619 Thousands/uL      nRBC 0 /100 WBCs      Neutrophils Relative 81 %      Immat GRANS % 1 %      Lymphocytes Relative 11 %      Monocytes Relative 6 %      Eosinophils Relative 1 %      Basophils Relative 0 %      Neutrophils Absolute 8 49 Thousands/µL      Immature Grans Absolute 0 06 Thousand/uL      Lymphocytes Absolute 1 18 Thousands/µL      Monocytes Absolute 0 61 Thousand/µL      Eosinophils Absolute 0 05 Thousand/µL Basophils Absolute 0 03 Thousands/µL                  PE Study with CT Abdomen and Pelvis with contrast   Final Result by Juan R Garcia MD (03/29 1852)      No acute findings in the chest  Chronic unchanged right lung volume loss and right lower lobe round atelectasis with a chronic small right basilar pleural effusion  No pulmonary arterial embolism  No acute inflammatory changes in the abdomen or the pelvis  Workstation performed: [id number]         CT head without contrast   Final Result by Juan R Garcia MD (03/29 1840)      No acute intracranial abnormality  Workstation performed: [id number]               Procedures  Procedures      ED Course  ED Course as of 03/30/22 0028   Tue Mar 29, 2022   1540 Procedure Note: EKG  Date/Time: 03/29/22 3:33 PM   Interpreted by: Bay Guardado  Indications / Diagnosis: CP  ECG reviewed by me, the ED Provider: yes   The EKG demonstrates:  Rhythm: rate 106, sinus tachycardia  Intervals: normal intervals  Axis: RAD  QRS/Blocks: RBBB  ST Changes: No acute ST Changes, no STD/VINI       1627 D-Dimer, Quant(!): 0 98  CT PE study ordered   1627 Creatinine(!): 1 44  At baseline   1627 Comprehensive metabolic panel(!)   4264 CBC and differential(!)   1627 Lipase: 170   1629 hs TnI 0hr: 12   1800 Urine Macroscopic, POC(!)             HEART Risk Score      Most Recent Value   Heart Score Risk Calculator    History 0 Filed at: 03/30/2022 0027   ECG 0 Filed at: 03/30/2022 1810   Age 2 Filed at: 03/30/2022 8640   Risk Factors 2 Filed at: 03/30/2022 0027   Troponin 0 Filed at: 03/30/2022 6437   HEART Score 4 Filed at: 03/30/2022 3189        Identification of Seniors at Risk      Most Recent Value   (ISAR) Identification of Seniors at Risk    Before the illness or injury that brought you to the Emergency, did you need someone to help you on a regular basis?  0 Filed at: 03/29/2022 1523   In the last 24 hours, have you needed more help than usual? 1 Filed at: 03/29/2022 1523   Have you been hospitalized for one or more nights during the past 6 months? 1 Filed at: 03/29/2022 1523   In general, do you see well? 0 Filed at: 03/29/2022 1523   In general, do you have serious problems with your memory? 0 Filed at: 03/29/2022 1523   Do you take more than three different medications every day? 1 Filed at: 03/29/2022 1523   ISAR Score 3 Filed at: 03/29/2022 1523            PERC Rule for PE      Most Recent Value   PERC Rule for PE    Age >=50 1 Filed at: 03/29/2022 1625   HR >=100 1 Filed at: 03/29/2022 1625   O2 Sat on room air < 95% 0 Filed at: 03/29/2022 1625   History of PE or DVT 0 Filed at: 03/29/2022 1625   Recent trauma or surgery 0 Filed at: 03/29/2022 1625   Hemoptysis 0 Filed at: 03/29/2022 1625   Exogenous estrogen 0 Filed at: 03/29/2022 1625   Unilateral leg swelling 0 Filed at: 03/29/2022 1625   PERC Rule for PE Results 2 Filed at: 03/29/2022 1625              SBIRT 22yo+      Most Recent Value   SBIRT (25 yo +)    In order to provide better care to our patients, we are screening all of our patients for alcohol and drug use  Would it be okay to ask you these screening questions?  No Filed at: 03/29/2022 1823          Wells' Criteria for PE      Most Recent Value   Wells' Criteria for PE    Clinical signs and symptoms of DVT 0 Filed at: 03/29/2022 1625   PE is primary diagnosis or equally likely 0 Filed at: 03/29/2022 1625   HR >100 1 5 Filed at: 03/29/2022 1625   Immobilization at least 3 days or Surgery in the previous 4 weeks 0 Filed at: 03/29/2022 1625   Previous, objectively diagnosed PE or DVT 0 Filed at: 03/29/2022 1625   Hemoptysis 0 Filed at: 03/29/2022 1625   Malignancy with treatment within 6 months or palliative 1 Filed at: 03/29/2022 1625   Wells' Criteria Total 2 5 Filed at: 03/29/2022 1625            [facility]  Number of Diagnoses or Management Options  Nausea: new and requires workup  Tremor: new and requires workup  UTI (urinary tract infection): new and requires workup  Diagnosis management comments: 81-year-old male who presents for evaluation of tremors, nausea  Will obtain labs, UA, cardiac workup, D-dimer given patient's tachycardia  Will also obtain CT head as patient noted urinary incontinence  Labs unremarkable apart from elevated D-dimer for which CT PE study was ordered and troponin of 12  Delta troponin 0  No acute EKG changes  CT PE study without any acute pathology  UA suggestive of UTI  Patient given dose of Rocephin in the emergency department and prescription for Keflex sent to patient's pharmacy  Advised follow-up with primary care physician  Return precautions discussed  Amount and/or Complexity of Data Reviewed  Clinical lab tests: ordered and reviewed  Tests in the radiology section of CPT®: ordered and reviewed  Review and summarize past medical records: yes    Risk of Complications, Morbidity, and/or Mortality  Presenting problems: high  Diagnostic procedures: low  Management options: moderate    Patient Progress  Patient progress: stable      Disposition  Final diagnoses:   UTI (urinary tract infection)   Tremor   Nausea     Time reflects when diagnosis was documented in both MDM as applicable and the Disposition within this note     Time User Action Codes Description Comment    3/29/2022  7:26 PM Clarencee Han Add [N39 0] UTI (urinary tract infection)     3/29/2022  7:26 PM Clarencee Han Add [R25 1] Tremor     3/29/2022  7:26 PM Clarencee Han Add [R11 0] Nausea       ED Disposition     ED Disposition Condition Date/Time Comment    Discharge Stable Tue Mar 29, 2022  7:26 PM Carina Morse Sr  discharge to home/self care              Follow-up Information     Follow up With Specialties Details Why Alisa Lynch MD Family Medicine In 2 days  [address]  [address] [address]  [phone]            Discharge Medication List as of 3/29/2022 8:00 PM      START taking these medications    Details   cephalexin (KEFLEX) 500 mg capsule Take 1 capsule (500 mg total) by mouth every 12 (twelve) hours for 5 days, Starting Tue 3/29/2022, Until Sun 4/3/2022, Normal         CONTINUE these medications which have NOT CHANGED    Details   BD Insulin Syringe U-500 31G X 6MM 0 5 ML MISC USE 1 SYRINGE THREE TIMES A DAY FOR INJECTING INSULIN, Normal      cyclobenzaprine (FLEXERIL) 10 mg tablet Take 1 tablet (10 mg total) by mouth 3 (three) times a day for 10 days, Starting Wed 2/23/2022, Until Sat 3/5/2022, Normal      ferrous sulfate 325 (65 Fe) mg tablet Take 1 tablet (325 mg total) by mouth daily with breakfast, Starting Wed 2/23/2022, Normal      fluticasone-vilanterol (BREO ELLIPTA) 100-25 mcg/inh inhaler Inhale 1 puff daily Rinse mouth after use , Starting Mon 1/31/2022, Normal      furosemide (LASIX) 40 mg tablet Take 1 tablet (40 mg total) by mouth daily, Starting Sun 1/30/2022, Normal      glucose blood (OneTouch Verio) test strip May substitute brand based on insurance coverage  Check glucose QID , Normal      HUMULIN R 500 UNIT/ML CONCENTRATED injection INJECT 45 UNITS TWICE DAILY BEFORE BREAKFAST AND LUNCH, Normal      metoprolol tartrate (LOPRESSOR) 25 mg tablet TAKE ONE TABLET BY MOUTH TWICE A DAY, Normal      potassium chloride (K-DUR,KLOR-CON) 20 mEq tablet Take 1 tablet (20 mEq total) by mouth daily, Starting Sun 1/30/2022, Normal      simvastatin (ZOCOR) 40 mg tablet TAKE ONE TABLET BY MOUTH EVERY DAY, Normal      Trelegy Ellipta 100-62 5-25 MCG/INH inhaler INHALE 1 PUFF BY MOUTH DAILY *RINSE MOUTH AFTER USE*, Normal           No discharge procedures on file  PDMP Review       Value Time User    PDMP Reviewed  Yes 1/21/2022  3:32 AM Karolina Martinez DO           ED Provider  Attending physically available and evaluated Janee Welsh     I managed the patient along with the ED Attending      Electronically Signed by         Luis Zelaya MD  03/30/22 0028

## 2022-03-30 LAB
ATRIAL RATE: 106 BPM
P AXIS: 0 DEGREES
QRS AXIS: 186 DEGREES
QRSD INTERVAL: 140 MS
QT INTERVAL: 398 MS
QTC INTERVAL: 528 MS
T WAVE AXIS: 15 DEGREES
VENTRICULAR RATE: 106 BPM

## 2022-03-30 PROCEDURE — 93010 ELECTROCARDIOGRAM REPORT: CPT | Performed by: INTERNAL MEDICINE

## 2022-03-31 LAB — BACTERIA UR CULT: ABNORMAL

## 2022-04-03 NOTE — RESULT ENCOUNTER NOTE
Called multiple times, no answer, phone no longer allows me to leave a message, certified letter filed to be sent

## 2022-04-08 ENCOUNTER — HOSPITAL ENCOUNTER (EMERGENCY)
Facility: HOSPITAL | Age: 77
Discharge: HOME | End: 2022-04-08
Attending: EMERGENCY MEDICINE | Admitting: EMERGENCY MEDICINE
Payer: COMMERCIAL

## 2022-04-08 VITALS
TEMPERATURE: 97.9 F | RESPIRATION RATE: 18 BRPM | DIASTOLIC BLOOD PRESSURE: 79 MMHG | SYSTOLIC BLOOD PRESSURE: 152 MMHG | HEART RATE: 86 BPM | OXYGEN SATURATION: 99 %

## 2022-04-08 DIAGNOSIS — M79.675 GREAT TOE PAIN, LEFT: Primary | ICD-10-CM

## 2022-04-08 DIAGNOSIS — L60.0 INGROWN TOENAIL: ICD-10-CM

## 2022-04-08 PROCEDURE — 99283 EMERGENCY DEPT VISIT LOW MDM: CPT

## 2022-04-08 PROCEDURE — 64450 NJX AA&/STRD OTHER PN/BRANCH: CPT | Performed by: EMERGENCY MEDICINE

## 2022-04-08 PROCEDURE — 99282 EMERGENCY DEPT VISIT SF MDM: CPT | Performed by: EMERGENCY MEDICINE

## 2022-04-08 RX ORDER — ROPIVACAINE HYDROCHLORIDE 5 MG/ML
15 INJECTION, SOLUTION EPIDURAL; INFILTRATION; PERINEURAL ONCE
Status: COMPLETED | OUTPATIENT
Start: 2022-04-08 | End: 2022-04-08

## 2022-04-08 RX ADMIN — ROPIVACAINE HYDROCHLORIDE 15 ML: 5 INJECTION, SOLUTION EPIDURAL; INFILTRATION; PERINEURAL at 15:25

## 2022-04-08 NOTE — ED NOTES
Pt d/c by provider  All d/c instructions and prescriptions given by provider   Encouraged to return with any further concerns      Inder Garcia RN  04/08/22 0499

## 2022-04-08 NOTE — ED PROVIDER NOTES
History  Chief Complaint   Patient presents with    Toe Pain     PT reports L  great toe pain and swellign over the past few days  79 yo M w/ hx of IDDM presents w/ L great toe pain x 2-3 days  Pain gradual in onset, isolated to the medial edge of the nail of the L great toe, throbbing, worse w/ ambulation, and moderate to severe  Denies trauma, fever, pain w/ toe flexion or extension, or hx of similar pain  Has not tried anything for the pain  Prior to Admission Medications   Prescriptions Last Dose Informant Patient Reported? Taking? BD Insulin Syringe U-500 31G X 6MM 0 5 ML MISC   No No   Sig: USE 1 SYRINGE THREE TIMES A DAY FOR INJECTING INSULIN   HUMULIN R 500 UNIT/ML CONCENTRATED injection   No No   Sig: INJECT 45 UNITS TWICE DAILY BEFORE BREAKFAST AND LUNCH   Trelegy Ellipta 100-62 5-25 MCG/INH inhaler   No No   Sig: INHALE 1 PUFF BY MOUTH DAILY *RINSE MOUTH AFTER USE*   cyclobenzaprine (FLEXERIL) 10 mg tablet   No No   Sig: Take 1 tablet (10 mg total) by mouth 3 (three) times a day for 10 days   ferrous sulfate 325 (65 Fe) mg tablet   No No   Sig: Take 1 tablet (325 mg total) by mouth daily with breakfast   fluticasone-vilanterol (BREO ELLIPTA) 100-25 mcg/inh inhaler   No No   Sig: Inhale 1 puff daily Rinse mouth after use  furosemide (LASIX) 40 mg tablet   No No   Sig: Take 1 tablet (40 mg total) by mouth daily   glucose blood (OneTouch Verio) test strip   No No   Sig: May substitute brand based on insurance coverage  Check glucose QID     metoprolol tartrate (LOPRESSOR) 25 mg tablet   No No   Sig: TAKE ONE TABLET BY MOUTH TWICE A DAY   potassium chloride (K-DUR,KLOR-CON) 20 mEq tablet   No No   Sig: Take 1 tablet (20 mEq total) by mouth daily   simvastatin (ZOCOR) 40 mg tablet   No No   Sig: TAKE ONE TABLET BY MOUTH EVERY DAY      Facility-Administered Medications: None       Past Medical History:   Diagnosis Date    Abdominal pain     [facility] 6/19/2018    Cardiac disease     CHF (congestive heart failure) (HCC)     COPD, severe (HCC)     Coronary artery disease     DDD (degenerative disc disease), lumbar 2020    Diabetes mellitus ([facility] )     Dyspnea     GERD (gastroesophageal reflux disease)     Hyperlipidemia     Hypertension     [facility]     with 3 stents    Nodule of apex of right lung     JACQUELINE (obstructive sleep apnea)     Prostate cancer [facility])        Past Surgical History:   Procedure Laterality Date    ABDOMINAL SURGERY      exploratory    ANGIOPLASTY      3 stents    APPENDECTOMY      COLONOSCOPY      CT NEEDLE BIOPSY LUNG  11/3/2020    ESOPHAGOGASTRODUODENOSCOPY N/A 10/2/2017    Procedure: ESOPHAGOGASTRODUODENOSCOPY (EGD); Surgeon: Eveline Stroud MD;  Location:  GI LAB; Service: Gastroenterology    IR THORACENTESIS  11/3/2020    KNEE CARTILAGE SURGERY      OTHER SURGICAL HISTORY      stent placement    PROSTATE SURGERY      SKIN GRAFT      Basal cell CA back       Family History   Problem Relation Age of Onset    Heart disease Father     Other Father         Mesothelioma      I have reviewed and agree with the history as documented  E-Cigarette/Vaping    E-Cigarette Use Never User      E-Cigarette/Vaping Substances    Nicotine No     THC No     CBD No     Flavoring No     Other No     Unknown No      Social History     Tobacco Use    Smoking status: Former Smoker     Packs/day: 1 50     Years: 50 00     Pack years: 75 00     Start date: 38     Quit date: 2020     Years since quittin 7    Smokeless tobacco: Never Used   Vaping Use    Vaping Use: Never used   Substance Use Topics    Alcohol use: Not Currently    Drug use: No        Review of Systems   All other systems reviewed and are negative        Physical Exam  ED Triage Vitals [22 1347]   Temperature Pulse Respirations Blood Pressure SpO2   97 9 °F (36 6 °C) 86 18 152/79 99 %      Temp Source Heart Rate Source Patient Position - Orthostatic VS BP Location FiO2 (%)   Oral Monitor Lying Right arm --      Pain Score       8             Orthostatic Vital Signs  Vitals:    04/08/22 1347   BP: 152/79   Pulse: 86   Patient Position - Orthostatic VS: Lying       Physical Exam  Vitals and nursing note reviewed  Constitutional:       General: He is not in acute distress  Appearance: Normal appearance  He is well-developed and normal weight  He is not ill-appearing, toxic-appearing or diaphoretic  HENT:      Head: Normocephalic and atraumatic  Right Ear: External ear normal       Left Ear: External ear normal       Nose: Nose normal       Mouth/Throat:      Mouth: Mucous membranes are moist    Eyes:      General:         Right eye: No discharge  Left eye: No discharge  Conjunctiva/sclera: Conjunctivae normal    Cardiovascular:      Rate and Rhythm: Normal rate  Pulses: Normal pulses  Pulmonary:      Effort: Pulmonary effort is normal  No respiratory distress  Musculoskeletal:         General: Tenderness present  No swelling or signs of injury  Normal range of motion  Cervical back: Normal range of motion  Comments: Tenderness isolated the medial edge of toenail of L great toe  No erythema or drainage  Full ROM  No erythema or tenderness of DIP or PIP joints  Skin:     General: Skin is warm and dry  Capillary Refill: Capillary refill takes less than 2 seconds  Findings: No bruising, erythema or rash  Neurological:      Mental Status: He is alert and oriented to person, place, and time  Motor: No weakness     Psychiatric:         Mood and Affect: Mood normal          Behavior: Behavior normal          ED Medications  Medications   ropivacaine (NAROPIN) 0 5 % injection 15 mL (15 mL Caudal Block Given by Other 4/8/22 1529)       Diagnostic Studies  Results Reviewed     None                 No orders to display         Procedures  Digital Block  Performed by: Elvia Mckeon MD  Authorized by: Ernesto Mckeon MD     Procedure date/time:  4/8/2022 3:50 PM  Consent:     Consent obtained:  Verbal    Consent given by:  Patient    Risks discussed: Allergic reaction, infection, nerve damage, swelling, unsuccessful block, pain, intravascular injection and bleeding    Alternatives discussed:  No treatment  Indications:     Indications:  Pain relief  Location:     Block location:  Toe    Toe blocked:  L great toe  Pre-procedure details:     Neurovascular status: intact      Skin preparation:  Povidone-iodine  Procedure details (see MAR for exact dosages):     Syringe type:  Luer lock syringe    Needle gauge:  27 G    Local anesthetic: ropivacaine  Technique: Three-sided block    Injection procedure:  Anatomic landmarks identified, anatomic landmarks palpated, incremental injection, introduced needle and negative aspiration for blood  Post-procedure details:     Outcome:  Pain relieved    Patient tolerance of procedure: Tolerated well, no immediate complications          ED Course                                       MDM  Number of Diagnoses or Management Options  Great toe pain, left: new and does not require workup  Ingrown toenail: new and does not require workup  Diagnosis management comments: Impression: pain likely due to ingrown nail  No joint involvement  No signs or symptoms suggestive of septic arthritis or crystallopathy      Plan: digital block, follow up with podiatry       Amount and/or Complexity of Data Reviewed  Review and summarize past medical records: yes    Risk of Complications, Morbidity, and/or Mortality  Presenting problems: low  Diagnostic procedures: minimal  Management options: low    Patient Progress  Patient progress: improved      Disposition  Final diagnoses:   Great toe pain, left   Ingrown toenail     Time reflects when diagnosis was documented in both MDM as applicable and the Disposition within this note     Time User Action Codes Description Comment 4/8/2022  2:54 PM Elziumer Kirby Add [G71 252] Great toe pain, left     4/8/2022  3:52 PM Eliz Kirby Add [L60 0] Ingrown toenail       ED Disposition     ED Disposition Condition Date/Time Comment    Discharge Stable Fri Apr 8, 2022  3:58 PM Stan Francisco 794  discharge to home/self care  Follow-up Information     Follow up With Specialties Details Why Contact Info Additional Information    [facility] Call on 4/11/2022 for long-term nail management [address] [address]  [address] [facility], [address] [place], [place], [address] [facility]          Discharge Medication List as of 4/8/2022  3:58 PM      CONTINUE these medications which have NOT CHANGED    Details   BD Insulin Syringe U-500 31G X 6MM 0 5 ML MISC USE 1 SYRINGE THREE TIMES A DAY FOR INJECTING INSULIN, Normal      cyclobenzaprine (FLEXERIL) 10 mg tablet Take 1 tablet (10 mg total) by mouth 3 (three) times a day for 10 days, Starting Wed 2/23/2022, Until Sat 3/5/2022, Normal      ferrous sulfate 325 (65 Fe) mg tablet Take 1 tablet (325 mg total) by mouth daily with breakfast, Starting Wed 2/23/2022, Normal      fluticasone-vilanterol (BREO ELLIPTA) 100-25 mcg/inh inhaler Inhale 1 puff daily Rinse mouth after use , Starting Mon 1/31/2022, Normal      furosemide (LASIX) 40 mg tablet Take 1 tablet (40 mg total) by mouth daily, Starting Sun 1/30/2022, Normal      glucose blood (OneTouch Verio) test strip May substitute brand based on insurance coverage   Check glucose QID , Normal      HUMULIN R 500 UNIT/ML CONCENTRATED injection INJECT 45 UNITS TWICE DAILY BEFORE BREAKFAST AND LUNCH, Normal      metoprolol tartrate (LOPRESSOR) 25 mg tablet TAKE ONE TABLET BY MOUTH TWICE A DAY, Normal      potassium chloride (K-DUR,KLOR-CON) 20 mEq tablet Take 1 tablet (20 mEq total) by mouth daily, Starting Sun 1/30/2022, Normal      simvastatin (ZOCOR) 40 mg tablet TAKE ONE TABLET BY MOUTH EVERY DAY, Normal      Trelegy Ellipta 100-62 5-25 MCG/INH inhaler INHALE 1 PUFF BY MOUTH DAILY *RINSE MOUTH AFTER USE*, Normal           No discharge procedures on file  PDMP Review       Value Time User    PDMP Reviewed  Yes 1/21/2022  3:32 AM Ying Sifuentes DO           ED Provider  Attending physically available and evaluated Nikki Houston Raymundo    I managed the patient along with the ED Attending      Electronically Signed by         Novella Litten, MD  04/08/22 Terrance Boyer

## 2022-04-08 NOTE — ED ATTENDING ATTESTATION
Final Diagnosis:  1  Great toe pain, left    2  Ingrown toenail           Marina JAMES MD, saw and evaluated the patient  All available labs and X-rays were ordered by me or the resident and have been reviewed by myself  I discussed the patient with the resident / non-physician and agree with the resident's / non-physician practitioner's findings and plan as documented in the resident's / non-physician practicitioner's note, except where noted  At this point, I agree with the current assessment done in the ED  I was present during key portions of all procedures performed unless otherwise stated  Chief Complaint   Patient presents with    Toe Pain     PT reports L  great toe pain and swellign over the past few days  This is a 78 y o  male presenting for evaluation of LEFT big toe pain  Distally  Several days  Medially of nail  No f/ch  Pain with walking (throbbing)  No hx of similar     PMH:   has a past medical history of Abdominal pain, MARANDA (acute kidney injury) ([address] ) (6/19/2018), Cardiac disease, CHF (congestive heart failure) ([address] ), COPD, severe ([address] ), Coronary artery disease, DDD (degenerative disc disease), lumbar (9/1/2020), Diabetes mellitus ([address] ), Dyspnea, GERD (gastroesophageal reflux disease), Hyperlipidemia, Hypertension, MI (myocardial infarction) ([address] ), Nodule of apex of right lung, JACQUELINE (obstructive sleep apnea), and Prostate cancer ([address] )  PSH:   has a past surgical history that includes Prostate surgery; Abdominal surgery; Angioplasty; Esophagogastroduodenoscopy (N/A, 10/2/2017); Appendectomy; Skin graft; Knee cartilage surgery; Other surgical history; Colonoscopy (2015); IR thoracentesis (11/3/2020); and CT needle biopsy lung (11/3/2020)      Social:  Social History     Substance and Sexual Activity   Alcohol Use Not Currently     Social History     Tobacco Use   Smoking Status Former Smoker    Packs/day: 1 50    Years: 50 00    Pack years: 75 00    Start date: [address] date: 2020    Years since quittin 7   Smokeless Tobacco Never Used     Social History     Substance and Sexual Activity   Drug Use No     PE:  Vitals:    22 1347   BP: 152/79   BP Location: Right arm   Pulse: 86   Resp: 18   Temp: 97 9 °F (36 6 °C)   TempSrc: Oral   SpO2: 99%   General: VS reviewed  Appears in NAD  awake, alert  Well-nourished, well-developed  Appears stated age  Speaking normally in full sentences  Head: Normocephalic, atraumatic  Eyes: EOM-I  No diplopia  No hyphema  No subconjunctival hemorrhages  Symmetrical lids  ENT: Atraumatic external nose and ears  MMM  No malocclusion  No stridor  Normal phonation  No drooling  Normal swallowing  Neck: No JVD  CV: No pallor noted  Lungs:   No tachypnea  No respiratory distress  MSK:   FROM spontaneously  Skin: Dry  LEFT:  medial of 1st toe's nail, there's tenderness  No redness  The nail itself is 1 cm thick  No purulence  Component of ingrown toe-nail  Neuro: Awake, alert, GCS15, CN II-XII grossly intact  Motor grossly intact  Psychiatric/Behavioral: Appropriate mood and affect   Exam: deferred  A:  - ingrown toenail needing routine care? ?  P:  - offer block  - f/u podiatry    - 13 point ROS was performed and all are normal unless stated in the history above  - Nursing note reviewed  Vitals reviewed  - Orders placed by myself and/or advanced practitioner / resident     - Previous chart was reviewed  - No language barrier    - History obtained from patient  - There are no limitations to the history obtained  - Critical care time: Not applicable for this patient       Code Status: Prior  Advance Directive and Living Will:      Power of :    POLST:      Medications   ropivacaine (NAROPIN) 0 5 % injection 15 mL (15 mL Caudal Block Given by Other 22 3527)     No orders to display     Orders Placed This Encounter   Procedures    Digital Block       Labs Reviewed - No data to display  Time reflects when diagnosis was documented in both MDM as applicable and the Disposition within this note       Time User Action Codes Description Comment    4/8/2022  2:54 PM Severo Loveless Add [Z01 522] Great toe pain, left     4/8/2022  3:52 PM Severo Loveless Add [L60 0] Ingrown toenail           ED Disposition       ED Disposition Condition Date/Time Comment    Discharge Stable Fri Apr 8, 2022  3:58 PM Cydney Kahn Sr  discharge to home/self care  Follow-up Information       Follow up With Specialties Details Why Contact Info Additional Information    [facility] Call on 4/11/2022 for long-term nail management [address] [phone]  [address] [facility], [facility] [place], [place], [address] [facility]          Discharge Medication List as of 4/8/2022  3:58 PM        CONTINUE these medications which have NOT CHANGED    Details   BD Insulin Syringe U-500 31G X 6MM 0 5 ML MISC USE 1 SYRINGE THREE TIMES A DAY FOR INJECTING INSULIN, Normal      cyclobenzaprine (FLEXERIL) 10 mg tablet Take 1 tablet (10 mg total) by mouth 3 (three) times a day for 10 days, Starting Wed 2/23/2022, Until Sat 3/5/2022, Normal      ferrous sulfate 325 (65 Fe) mg tablet Take 1 tablet (325 mg total) by mouth daily with breakfast, Starting Wed 2/23/2022, Normal      fluticasone-vilanterol (BREO ELLIPTA) 100-25 mcg/inh inhaler Inhale 1 puff daily Rinse mouth after use , Starting Mon 1/31/2022, Normal      furosemide (LASIX) 40 mg tablet Take 1 tablet (40 mg total) by mouth daily, Starting Sun 1/30/2022, Normal      glucose blood (OneTouch Verio) test strip May substitute brand based on insurance coverage   Check glucose QID , Normal      HUMULIN R 500 UNIT/ML CONCENTRATED injection INJECT 45 UNITS TWICE DAILY BEFORE BREAKFAST AND LUNCH, Normal      metoprolol tartrate (LOPRESSOR) 25 mg tablet TAKE ONE TABLET BY MOUTH TWICE A DAY, Normal      potassium chloride (K-DUR,KLOR-CON) 20 mEq tablet Take 1 tablet (20 mEq total) by mouth daily, Starting Sun 1/30/2022, Normal      simvastatin (ZOCOR) 40 mg tablet TAKE ONE TABLET BY MOUTH EVERY DAY, Normal      Trelegy Ellipta 100-62 5-25 MCG/INH inhaler INHALE 1 PUFF BY MOUTH DAILY *RINSE MOUTH AFTER USE*, Normal           No discharge procedures on file  Prior to Admission Medications   Prescriptions Last Dose Informant Patient Reported? Taking? BD Insulin Syringe U-500 31G X 6MM 0 5 ML MISC   No No   Sig: USE 1 SYRINGE THREE TIMES A DAY FOR INJECTING INSULIN   HUMULIN R 500 UNIT/ML CONCENTRATED injection   No No   Sig: INJECT 45 UNITS TWICE DAILY BEFORE BREAKFAST AND LUNCH   Trelegy Ellipta 100-62 5-25 MCG/INH inhaler   No No   Sig: INHALE 1 PUFF BY MOUTH DAILY *RINSE MOUTH AFTER USE*   cyclobenzaprine (FLEXERIL) 10 mg tablet   No No   Sig: Take 1 tablet (10 mg total) by mouth 3 (three) times a day for 10 days   ferrous sulfate 325 (65 Fe) mg tablet   No No   Sig: Take 1 tablet (325 mg total) by mouth daily with breakfast   fluticasone-vilanterol (BREO ELLIPTA) 100-25 mcg/inh inhaler   No No   Sig: Inhale 1 puff daily Rinse mouth after use  furosemide (LASIX) 40 mg tablet   No No   Sig: Take 1 tablet (40 mg total) by mouth daily   glucose blood (OneTouch Verio) test strip   No No   Sig: May substitute brand based on insurance coverage  Check glucose QID  metoprolol tartrate (LOPRESSOR) 25 mg tablet   No No   Sig: TAKE ONE TABLET BY MOUTH TWICE A DAY   potassium chloride (K-DUR,KLOR-CON) 20 mEq tablet   No No   Sig: Take 1 tablet (20 mEq total) by mouth daily   simvastatin (ZOCOR) 40 mg tablet   No No   Sig: TAKE ONE TABLET BY MOUTH EVERY DAY      Facility-Administered Medications: None       Portions of the record may have been created with voice recognition software  Occasional wrong word or "sound a like" substitutions may have occurred due to the inherent limitations of voice recognition software   Read the chart carefully and recognize, using context, where substitutions have occurred      Electronically signed by:  Gavin Marrufo

## 2022-04-21 ENCOUNTER — HOSPITAL ENCOUNTER (INPATIENT)
Facility: HOSPITAL | Age: 77
LOS: 2 days | Discharge: HOME | DRG: 191 | End: 2022-04-24
Attending: EMERGENCY MEDICINE | Admitting: INTERNAL MEDICINE
Payer: COMMERCIAL

## 2022-04-21 ENCOUNTER — APPOINTMENT (EMERGENCY)
Dept: RADIOLOGY | Facility: HOSPITAL | Age: 77
DRG: 191 | End: 2022-04-21
Payer: COMMERCIAL

## 2022-04-21 DIAGNOSIS — I50.32 CHRONIC DIASTOLIC HEART FAILURE (HCC): ICD-10-CM

## 2022-04-21 DIAGNOSIS — I50.9 CHF (CONGESTIVE HEART FAILURE) (HCC): ICD-10-CM

## 2022-04-21 DIAGNOSIS — R07.9 CHEST PAIN: Primary | ICD-10-CM

## 2022-04-21 DIAGNOSIS — E11.65 TYPE 2 DIABETES MELLITUS WITH HYPERGLYCEMIA, WITH LONG-TERM CURRENT USE OF INSULIN (HCC): Chronic | ICD-10-CM

## 2022-04-21 DIAGNOSIS — R06.00 DYSPNEA: ICD-10-CM

## 2022-04-21 DIAGNOSIS — Z79.4 TYPE 2 DIABETES MELLITUS WITH HYPERGLYCEMIA, WITH LONG-TERM CURRENT USE OF INSULIN (HCC): Chronic | ICD-10-CM

## 2022-04-21 DIAGNOSIS — J44.1 COPD EXACERBATION (HCC): ICD-10-CM

## 2022-04-21 PROBLEM — R06.02 SHORTNESS OF BREATH: Status: ACTIVE | Noted: 2022-04-21

## 2022-04-21 LAB
2HR DELTA HS TROPONIN: -1 NG/L
ANION GAP SERPL CALCULATED.3IONS-SCNC: 2 MMOL/L (ref 4–13)
BASOPHILS # BLD AUTO: 0.03 THOUSANDS/ΜL (ref 0–0.1)
BASOPHILS NFR BLD AUTO: 0 % (ref 0–1)
BUN SERPL-MCNC: 19 MG/DL (ref 5–25)
CALCIUM SERPL-MCNC: 8.6 MG/DL (ref 8.3–10.1)
CARDIAC TROPONIN I PNL SERPL HS: 14 NG/L
CARDIAC TROPONIN I PNL SERPL HS: 15 NG/L
CHLORIDE SERPL-SCNC: 103 MMOL/L (ref 100–108)
CO2 SERPL-SCNC: 30 MMOL/L (ref 21–32)
CREAT SERPL-MCNC: 1.43 MG/DL (ref 0.6–1.3)
EOSINOPHIL # BLD AUTO: 0.07 THOUSAND/ΜL (ref 0–0.61)
EOSINOPHIL NFR BLD AUTO: 1 % (ref 0–6)
ERYTHROCYTE [DISTWIDTH] IN BLOOD BY AUTOMATED COUNT: 16.3 % (ref 11.6–15.1)
FLUAV RNA RESP QL NAA+PROBE: NEGATIVE
FLUBV RNA RESP QL NAA+PROBE: NEGATIVE
GFR SERPL CREATININE-BSD FRML MDRD: 47 ML/MIN/1.73SQ M
GLUCOSE SERPL-MCNC: 329 MG/DL (ref 65–140)
HCT VFR BLD AUTO: 38.1 % (ref 36.5–49.3)
HGB BLD-MCNC: 11.9 G/DL (ref 12–17)
IMM GRANULOCYTES # BLD AUTO: 0.05 THOUSAND/UL (ref 0–0.2)
IMM GRANULOCYTES NFR BLD AUTO: 1 % (ref 0–2)
LYMPHOCYTES # BLD AUTO: 1.11 THOUSANDS/ΜL (ref 0.6–4.47)
LYMPHOCYTES NFR BLD AUTO: 12 % (ref 14–44)
MCH RBC QN AUTO: 25.8 PG (ref 26.8–34.3)
MCHC RBC AUTO-ENTMCNC: 31.2 G/DL (ref 31.4–37.4)
MCV RBC AUTO: 83 FL (ref 82–98)
MONOCYTES # BLD AUTO: 0.63 THOUSAND/ΜL (ref 0.17–1.22)
MONOCYTES NFR BLD AUTO: 7 % (ref 4–12)
NEUTROPHILS # BLD AUTO: 7.06 THOUSANDS/ΜL (ref 1.85–7.62)
NEUTS SEG NFR BLD AUTO: 79 % (ref 43–75)
NRBC BLD AUTO-RTO: 0 /100 WBCS
NT-PROBNP SERPL-MCNC: 951 PG/ML
PLATELET # BLD AUTO: 155 THOUSANDS/UL (ref 149–390)
PMV BLD AUTO: 10.8 FL (ref 8.9–12.7)
POTASSIUM SERPL-SCNC: 4.7 MMOL/L (ref 3.5–5.3)
PROCALCITONIN SERPL-MCNC: 0.1 NG/ML
RBC # BLD AUTO: 4.62 MILLION/UL (ref 3.88–5.62)
RSV RNA RESP QL NAA+PROBE: NEGATIVE
SARS-COV-2 RNA RESP QL NAA+PROBE: NEGATIVE
SODIUM SERPL-SCNC: 135 MMOL/L (ref 136–145)
WBC # BLD AUTO: 8.95 THOUSAND/UL (ref 4.31–10.16)

## 2022-04-21 PROCEDURE — 94640 AIRWAY INHALATION TREATMENT: CPT

## 2022-04-21 PROCEDURE — 83880 ASSAY OF NATRIURETIC PEPTIDE: CPT | Performed by: EMERGENCY MEDICINE

## 2022-04-21 PROCEDURE — 36415 COLL VENOUS BLD VENIPUNCTURE: CPT | Performed by: EMERGENCY MEDICINE

## 2022-04-21 PROCEDURE — 71045 X-RAY EXAM CHEST 1 VIEW: CPT

## 2022-04-21 PROCEDURE — 99285 EMERGENCY DEPT VISIT HI MDM: CPT

## 2022-04-21 PROCEDURE — 99220 PR INITIAL OBSERVATION CARE/DAY 70 MINUTES: CPT | Performed by: INTERNAL MEDICINE

## 2022-04-21 PROCEDURE — 80048 BASIC METABOLIC PNL TOTAL CA: CPT | Performed by: EMERGENCY MEDICINE

## 2022-04-21 PROCEDURE — 84145 PROCALCITONIN (PCT): CPT | Performed by: INTERNAL MEDICINE

## 2022-04-21 PROCEDURE — 93005 ELECTROCARDIOGRAM TRACING: CPT

## 2022-04-21 PROCEDURE — 99285 EMERGENCY DEPT VISIT HI MDM: CPT | Performed by: EMERGENCY MEDICINE

## 2022-04-21 PROCEDURE — 96374 THER/PROPH/DIAG INJ IV PUSH: CPT

## 2022-04-21 PROCEDURE — 0241U HB NFCT DS VIR RESP RNA 4 TRGT: CPT | Performed by: EMERGENCY MEDICINE

## 2022-04-21 PROCEDURE — 85025 COMPLETE CBC W/AUTO DIFF WBC: CPT | Performed by: EMERGENCY MEDICINE

## 2022-04-21 PROCEDURE — 84484 ASSAY OF TROPONIN QUANT: CPT | Performed by: EMERGENCY MEDICINE

## 2022-04-21 RX ORDER — CYCLOBENZAPRINE HCL 10 MG
10 TABLET ORAL 3 TIMES DAILY
Status: DISCONTINUED | OUTPATIENT
Start: 2022-04-21 | End: 2022-04-24 | Stop reason: HOSPADM

## 2022-04-21 RX ORDER — ALBUTEROL SULFATE 2.5 MG/3ML
5 SOLUTION RESPIRATORY (INHALATION) ONCE
Status: COMPLETED | OUTPATIENT
Start: 2022-04-21 | End: 2022-04-21

## 2022-04-21 RX ORDER — FERROUS SULFATE 325(65) MG
325 TABLET ORAL
Status: DISCONTINUED | OUTPATIENT
Start: 2022-04-22 | End: 2022-04-24 | Stop reason: HOSPADM

## 2022-04-21 RX ORDER — SODIUM CHLORIDE 9 MG/ML
3 INJECTION INTRAVENOUS
Status: DISCONTINUED | OUTPATIENT
Start: 2022-04-21 | End: 2022-04-24 | Stop reason: HOSPADM

## 2022-04-21 RX ORDER — PRAVASTATIN SODIUM 80 MG/1
80 TABLET ORAL
Status: DISCONTINUED | OUTPATIENT
Start: 2022-04-22 | End: 2022-04-24 | Stop reason: HOSPADM

## 2022-04-21 RX ORDER — METHYLPREDNISOLONE SODIUM SUCCINATE 125 MG/2ML
80 INJECTION, POWDER, LYOPHILIZED, FOR SOLUTION INTRAMUSCULAR; INTRAVENOUS ONCE
Status: COMPLETED | OUTPATIENT
Start: 2022-04-21 | End: 2022-04-21

## 2022-04-21 RX ORDER — LEVALBUTEROL INHALATION SOLUTION 1.25 MG/3ML
1.25 SOLUTION RESPIRATORY (INHALATION)
Status: DISCONTINUED | OUTPATIENT
Start: 2022-04-21 | End: 2022-04-24 | Stop reason: HOSPADM

## 2022-04-21 RX ORDER — FUROSEMIDE 10 MG/ML
40 INJECTION INTRAMUSCULAR; INTRAVENOUS ONCE
Status: COMPLETED | OUTPATIENT
Start: 2022-04-21 | End: 2022-04-21

## 2022-04-21 RX ORDER — LEVALBUTEROL INHALATION SOLUTION 1.25 MG/3ML
1.25 SOLUTION RESPIRATORY (INHALATION) EVERY 6 HOURS PRN
Status: DISCONTINUED | OUTPATIENT
Start: 2022-04-21 | End: 2022-04-22

## 2022-04-21 RX ORDER — FLUTICASONE FUROATE AND VILANTEROL 100; 25 UG/1; UG/1
1 POWDER RESPIRATORY (INHALATION) DAILY
Status: DISCONTINUED | OUTPATIENT
Start: 2022-04-22 | End: 2022-04-24 | Stop reason: HOSPADM

## 2022-04-21 RX ORDER — FUROSEMIDE 40 MG/1
40 TABLET ORAL DAILY
Status: DISCONTINUED | OUTPATIENT
Start: 2022-04-22 | End: 2022-04-24 | Stop reason: HOSPADM

## 2022-04-21 RX ORDER — SODIUM CHLORIDE FOR INHALATION 0.9 %
3 VIAL, NEBULIZER (ML) INHALATION
Status: DISCONTINUED | OUTPATIENT
Start: 2022-04-21 | End: 2022-04-22

## 2022-04-21 RX ORDER — PREDNISONE 20 MG/1
40 TABLET ORAL DAILY
Status: DISCONTINUED | OUTPATIENT
Start: 2022-04-22 | End: 2022-04-24 | Stop reason: HOSPADM

## 2022-04-21 RX ORDER — SODIUM CHLORIDE FOR INHALATION 0.9 %
3 VIAL, NEBULIZER (ML) INHALATION EVERY 6 HOURS PRN
Status: DISCONTINUED | OUTPATIENT
Start: 2022-04-21 | End: 2022-04-22

## 2022-04-21 RX ADMIN — FUROSEMIDE 40 MG: 10 INJECTION, SOLUTION INTRAMUSCULAR; INTRAVENOUS at 22:24

## 2022-04-21 RX ADMIN — ALBUTEROL SULFATE 5 MG: 2.5 SOLUTION RESPIRATORY (INHALATION) at 19:39

## 2022-04-21 RX ADMIN — ALBUTEROL SULFATE 5 MG: 2.5 SOLUTION RESPIRATORY (INHALATION) at 17:47

## 2022-04-21 RX ADMIN — IPRATROPIUM BROMIDE 0.5 MG: 0.5 SOLUTION RESPIRATORY (INHALATION) at 17:47

## 2022-04-21 RX ADMIN — METHYLPREDNISOLONE SODIUM SUCCINATE 80 MG: 125 INJECTION, POWDER, FOR SOLUTION INTRAMUSCULAR; INTRAVENOUS at 17:51

## 2022-04-21 NOTE — ED PROVIDER NOTES
History  Chief Complaint   Patient presents with    Chest Pain     Pt began with chest pain this morning  Stated that the pains have been getting worse, but he put off coming into the ED  Pain is across his chest   Pt feels SOB, also began this morning  79 y/o male with hx of HTN, DM, CAD, COPD, and CHF presents to the ED for evaluation of chest pain and worsening dyspnea that started at 0900 this morning  He describes his pain as a pressure across his anterior chest/sternum, nonradiating  He uses 2 L nasal cannula oxygen at home intermittently, but notes that since today he has had to more frequently use his nasal cannula oxygen  He has a chronic cough, which he reports is unchanged and at baseline  He denies fever, chills, abdominal pain, nausea, vomiting, diarrhea, back pain, neck pain, headache, numbness, and weakness  Prior to Admission Medications   Prescriptions Last Dose Informant Patient Reported? Taking? BD Insulin Syringe U-500 31G X 6MM 0 5 ML MISC   No No   Sig: USE 1 SYRINGE THREE TIMES A DAY FOR INJECTING INSULIN   HUMULIN R 500 UNIT/ML CONCENTRATED injection   No No   Sig: INJECT 45 UNITS TWICE DAILY BEFORE BREAKFAST AND LUNCH   Trelegy Ellipta 100-62 5-25 MCG/INH inhaler   No No   Sig: INHALE 1 PUFF BY MOUTH DAILY *RINSE MOUTH AFTER USE*   cyclobenzaprine (FLEXERIL) 10 mg tablet   No No   Sig: Take 1 tablet (10 mg total) by mouth 3 (three) times a day for 10 days   ferrous sulfate 325 (65 Fe) mg tablet   No No   Sig: Take 1 tablet (325 mg total) by mouth daily with breakfast   fluticasone-vilanterol (BREO ELLIPTA) 100-25 mcg/inh inhaler   No No   Sig: Inhale 1 puff daily Rinse mouth after use  furosemide (LASIX) 40 mg tablet   No No   Sig: Take 1 tablet (40 mg total) by mouth daily   glucose blood (OneTouch Verio) test strip   No No   Sig: May substitute brand based on insurance coverage  Check glucose QID     metoprolol tartrate (LOPRESSOR) 25 mg tablet   No No   Sig: TAKE ONE TABLET BY MOUTH TWICE A DAY   potassium chloride (K-DUR,KLOR-CON) 20 mEq tablet   No No   Sig: Take 1 tablet (20 mEq total) by mouth daily   simvastatin (ZOCOR) 40 mg tablet   No No   Sig: TAKE ONE TABLET BY MOUTH EVERY DAY      Facility-Administered Medications: None       Past Medical History:   Diagnosis Date    Abdominal pain     MARANDA (acute kidney injury) ([place] [address] ) 2018    Cardiac disease     [facility])     COPD, severe (HCC)     Coronary artery disease     DDD (degenerative disc disease), lumbar 2020    Diabetes mellitus ([place] [address] )     Dyspnea     GERD (gastroesophageal reflux disease)     Hyperlipidemia     Hypertension     MI (myocardial infarction) ([place] [address] )     with 3 stents    Nodule of apex of right lung     JACQUELINE (obstructive sleep apnea)     Prostate cancer [facility])        Past Surgical History:   Procedure Laterality Date    ABDOMINAL SURGERY      exploratory    ANGIOPLASTY      3 stents    APPENDECTOMY      COLONOSCOPY      CT NEEDLE BIOPSY LUNG  11/3/2020    ESOPHAGOGASTRODUODENOSCOPY N/A 10/2/2017    Procedure: ESOPHAGOGASTRODUODENOSCOPY (EGD); Surgeon: Eveline Stroud MD;  Location: [facility]; Service: Gastroenterology    IR THORACENTESIS  11/3/2020    KNEE CARTILAGE SURGERY      OTHER SURGICAL HISTORY      stent placement    PROSTATE SURGERY      SKIN GRAFT      Basal cell CA back       Family History   Problem Relation Age of Onset    Heart disease Father     Other Father         Mesothelioma      I have reviewed and agree with the history as documented      E-Cigarette/Vaping    E-Cigarette Use Never User      E-Cigarette/Vaping Substances    Nicotine No     THC No     CBD No     Flavoring No     Other No     Unknown No      Social History     Tobacco Use    Smoking status: Former Smoker     Packs/day: 1 50     Years: 50 00     Pack years: 75 00     Start date: 38     Quit date: 2020     Years since quittin 8    Smokeless tobacco: Never Used   Vaping Use    Vaping Use: Never used   Substance Use Topics    Alcohol use: Not Currently    Drug use: No        Review of Systems   Constitutional: Negative for chills and fever  HENT: Negative for congestion, rhinorrhea and sore throat  Respiratory: Positive for cough and shortness of breath  Cardiovascular: Positive for chest pain and leg swelling  Negative for palpitations  Gastrointestinal: Negative for abdominal pain, diarrhea, nausea and vomiting  Genitourinary: Negative for dysuria and hematuria  Musculoskeletal: Negative for back pain and neck pain  Neurological: Negative for weakness, light-headedness, numbness and headaches  All other systems reviewed and are negative  Physical Exam  ED Triage Vitals   Temperature Pulse Respirations Blood Pressure SpO2   04/21/22 1700 04/21/22 1700 04/21/22 1700 04/21/22 1700 04/21/22 1700   98 9 °F (37 2 °C) (!) 107 22 (!) 174/82 97 %      Temp Source Heart Rate Source Patient Position - Orthostatic VS BP Location FiO2 (%)   04/21/22 1700 04/21/22 1700 04/21/22 1800 04/21/22 1800 --   Oral Monitor Lying Right arm       Pain Score       04/21/22 1700       8             Orthostatic Vital Signs  Vitals:    04/23/22 2252 04/24/22 0622 04/24/22 0622 04/24/22 0856   BP: 112/50 135/67 135/67    Pulse:    65   Patient Position - Orthostatic VS:           Physical Exam  Vitals and nursing note reviewed  Constitutional:       General: He is not in acute distress  Appearance: Normal appearance  He is well-developed  He is obese  Comments: Chronically-ill appearing but otherwise in no acute distress  HENT:      Head: Normocephalic and atraumatic  Right Ear: External ear normal       Left Ear: External ear normal       Nose: Nose normal       Mouth/Throat:      Mouth: Mucous membranes are moist       Pharynx: Oropharynx is clear  No oropharyngeal exudate or posterior oropharyngeal erythema     Eyes:      Extraocular Movements: Extraocular movements intact  Conjunctiva/sclera: Conjunctivae normal       Pupils: Pupils are equal, round, and reactive to light  Cardiovascular:      Rate and Rhythm: Normal rate and regular rhythm  Pulses: Normal pulses  Heart sounds: Normal heart sounds  Pulmonary:      Effort: Tachypnea present  No respiratory distress  Breath sounds: Decreased breath sounds and wheezing present  Chest:      Chest wall: No tenderness  Abdominal:      General: Abdomen is flat  Bowel sounds are normal  There is no distension  Palpations: Abdomen is soft  Tenderness: There is no abdominal tenderness  There is no right CVA tenderness, left CVA tenderness or guarding  Musculoskeletal:         General: No swelling or tenderness  Normal range of motion  Cervical back: Normal range of motion and neck supple  No tenderness  Right lower leg: No tenderness  Edema present  Left lower leg: No tenderness  Edema present  Skin:     General: Skin is warm and dry  Neurological:      General: No focal deficit present  Mental Status: He is alert and oriented to person, place, and time           ED Medications  Medications   albuterol inhalation solution 5 mg (5 mg Nebulization Given 4/21/22 1747)   ipratropium (ATROVENT) 0 02 % inhalation solution 0 5 mg (0 5 mg Nebulization Given 4/21/22 1747)   methylPREDNISolone sodium succinate (Solu-MEDROL) injection 80 mg (80 mg Intravenous Given 4/21/22 1751)   albuterol inhalation solution 5 mg (5 mg Nebulization Given 4/21/22 1939)   furosemide (LASIX) injection 40 mg (40 mg Intravenous Given 4/21/22 2224)       Diagnostic Studies  Results Reviewed     Procedure Component Value Units Date/Time    Fingerstick Glucose (POCT) [[id number]]  (Abnormal) Collected: 04/24/22 1123    Lab Status: Final result Updated: 04/24/22 1134     POC Glucose 230 mg/dl     Fingerstick Glucose (POCT) [[id number]]  (Normal) Collected: 04/24/22 0636    Lab Status: Final result Updated: 04/24/22 0703     POC Glucose 135 mg/dl     Fingerstick Glucose (POCT) [[id number]]  (Abnormal) Collected: 04/23/22 2058    Lab Status: Final result Updated: 04/23/22 2113     POC Glucose 141 mg/dl     Fingerstick Glucose (POCT) [[id number]]  (Abnormal) Collected: 04/23/22 1735    Lab Status: Final result Updated: 04/23/22 1737     POC Glucose 303 mg/dl     Fingerstick Glucose (POCT) [[id number]]  (Abnormal) Collected: 04/23/22 1153    Lab Status: Final result Updated: 04/23/22 1157     POC Glucose 300 mg/dl     Basic metabolic panel [[id number]]  (Abnormal) Collected: 04/23/22 0633    Lab Status: Final result Specimen: Blood from Arm, Left Updated: 04/23/22 0723     Sodium 142 mmol/L      Potassium 4 3 mmol/L      Chloride 104 mmol/L      CO2 36 mmol/L      ANION GAP 2 mmol/L      BUN 28 mg/dL      Creatinine 1 32 mg/dL      Glucose 49 mg/dL      Calcium 9 3 mg/dL      eGFR 52 ml/min/1 73sq m     Narrative:      [place] guidelines for Chronic Kidney Disease (CKD):     Stage 1 with normal or high GFR (GFR > 90 mL/min/1 73 square meters)    Stage 2 Mild CKD (GFR = 60-89 mL/min/1 73 square meters)    Stage 3A Moderate CKD (GFR = 45-59 mL/min/1 73 square meters)    Stage 3B Moderate CKD (GFR = 30-44 mL/min/1 73 square meters)    Stage 4 Severe CKD (GFR = 15-29 mL/min/1 73 square meters)    Stage 5 End Stage CKD (GFR <15 mL/min/1 73 square meters)  Note: GFR calculation is accurate only with a steady state creatinine    Fingerstick Glucose (POCT) [[id number]]  (Normal) Collected: 04/23/22 0714    Lab Status: Final result Updated: 04/23/22 0718     POC Glucose 122 mg/dl     Fingerstick Glucose (POCT) [[id number]]  (Normal) Collected: 04/23/22 0649    Lab Status: Final result Updated: 04/23/22 0655     POC Glucose 74 mg/dl     Fingerstick Glucose (POCT) [[id number]]  (Abnormal) Collected: 04/23/22 0623    Lab Status: Final result Updated: 04/23/22 0655     POC Glucose 42 mg/dl     Fingerstick Glucose (POCT) [[id number]]  (Abnormal) Collected: 04/22/22 2018    Lab Status: Final result Updated: 04/22/22 2021     POC Glucose 371 mg/dl     Fingerstick Glucose (POCT) [[id number]]  (Abnormal) Collected: 04/22/22 1628    Lab Status: Final result Updated: 04/22/22 1649     POC Glucose 237 mg/dl     Hemoglobin A1C w/ EAG Estimation [[id number]]  (Abnormal) Collected: 04/22/22 0357    Lab Status: Final result Specimen: Blood from Arm, Left Updated: 04/22/22 1539     Hemoglobin A1C 9 2 %       mg/dl     Fingerstick Glucose (POCT) [[id number]]  (Abnormal) Collected: 04/22/22 1129    Lab Status: Final result Updated: 04/22/22 1142     POC Glucose 345 mg/dl     Fingerstick Glucose (POCT) [[id number]]  (Abnormal) Collected: 04/22/22 0813    Lab Status: Final result Updated: 04/22/22 0815     POC Glucose 495 mg/dl     Fingerstick Glucose (POCT) [[id number]]  (Abnormal) Collected: 04/22/22 0610    Lab Status: Final result Updated: 04/22/22 0653     POC Glucose >500 mg/dl     Basic metabolic panel [[id number]]  (Abnormal) Collected: 04/22/22 0357    Lab Status: Final result Specimen: Blood from Arm, Left Updated: 04/22/22 0453     Sodium 128 mmol/L      Potassium 4 8 mmol/L      Chloride 94 mmol/L      CO2 26 mmol/L      ANION GAP 8 mmol/L      BUN 22 mg/dL      Creatinine 1 78 mg/dL      Glucose 617 mg/dL      Calcium 9 0 mg/dL      eGFR 36 ml/min/1 73sq m     Narrative:      [facility] guidelines for Chronic Kidney Disease (CKD):     Stage 1 with normal or high GFR (GFR > 90 mL/min/1 73 square meters)    Stage 2 Mild CKD (GFR = 60-89 mL/min/1 73 square meters)    Stage 3A Moderate CKD (GFR = 45-59 mL/min/1 73 square meters)    Stage 3B Moderate CKD (GFR = 30-44 mL/min/1 73 square meters)    Stage 4 Severe CKD (GFR = 15-29 mL/min/1 73 square meters)    Stage 5 End Stage CKD (GFR <15 mL/min/1 73 square meters)  Note: GFR calculation is accurate only with a steady state creatinine    CBC (With Platelets) [[id number]]  (Abnormal) Collected: 04/22/22 0357    Lab Status: Final result Specimen: Blood from Arm, Left Updated: 04/22/22 0414     WBC 7 78 Thousand/uL      RBC 4 98 Million/uL      Hemoglobin 12 7 g/dL      Hematocrit 40 8 %      MCV 82 fL      MCH 25 5 pg      MCHC 31 1 g/dL      RDW 15 9 %      Platelets 996 Thousands/uL      MPV 10 9 fL     Procalcitonin [[id number]]  (Normal) Collected: 04/21/22 2223    Lab Status: Final result Specimen: Blood from Arm, Right Updated: 04/21/22 2355     Procalcitonin 0 10 ng/ml     HS Troponin I 2hr [[id number]]  (Normal) Collected: 04/21/22 1951    Lab Status: Final result Specimen: Blood from Line, Venous Updated: 04/21/22 2026     hs TnI 2hr 14 ng/L      Delta 2hr hsTnI -1 ng/L     HS Troponin 0hr (reflex protocol) [[id number]]  (Normal) Collected: 04/21/22 1747    Lab Status: Final result Specimen: Blood from Arm, Right Updated: 04/21/22 1840     hs TnI 0hr 15 ng/L     COVID/FLU/RSV - 2 hour TAT [[id number]]  (Normal) Collected: 04/21/22 1747    Lab Status: Final result Specimen: Nares from Nose Updated: 04/21/22 1840     SARS-CoV-2 Negative     INFLUENZA A PCR Negative     INFLUENZA B PCR Negative     RSV PCR Negative    Narrative:      FOR PEDIATRIC PATIENTS - copy/paste COVID Guidelines URL to browser: [email or web address]    SARS-CoV-2 assay is a Nucleic Acid Amplification assay intended for the  qualitative detection of nucleic acid from SARS-CoV-2 in nasopharyngeal  swabs  Results are for the presumptive identification of SARS-CoV-2 RNA  Positive results are indicative of infection with SARS-CoV-2, the virus  causing COVID-19, but do not rule out bacterial infection or co-infection  with other viruses  Laboratories within the [place] and its  territories are required to report all positive results to the appropriate  public health authorities   Negative results do not preclude SARS-CoV-2  infection and should not be used as the sole basis for treatment or other  patient management decisions  Negative results must be combined with  clinical observations, patient history, and epidemiological information  This test has not been FDA cleared or approved  This test has been authorized by FDA under an Emergency Use Authorization  (EUA)  This test is only authorized for the duration of time the  declaration that circumstances exist justifying the authorization of the  emergency use of an in vitro diagnostic tests for detection of SARS-CoV-2  virus and/or diagnosis of COVID-19 infection under section 564(b)(1) of  the Act, 21 U  S C  805DHE-1(D)(9), unless the authorization is terminated  or revoked sooner  The test has been validated but independent review by FDA  and CLIA is pending  Test performed using [employer] Gene[employer]pert: This RT-PCR assay targets N2,  a region unique to SARS-CoV-2  A conserved region in the E-gene was chosen  for pan-Sarbecovirus detection which includes SARS-CoV-2      Basic metabolic panel [[id number]]  (Abnormal) Collected: 04/21/22 1747    Lab Status: Final result Specimen: Blood from Arm, Right Updated: 04/21/22 1815     Sodium 135 mmol/L      Potassium 4 7 mmol/L      Chloride 103 mmol/L      CO2 30 mmol/L      ANION GAP 2 mmol/L      BUN 19 mg/dL      Creatinine 1 43 mg/dL      Glucose 329 mg/dL      Calcium 8 6 mg/dL      eGFR 47 ml/min/1 73sq m     Narrative:      [place] guidelines for Chronic Kidney Disease (CKD):     Stage 1 with normal or high GFR (GFR > 90 mL/min/1 73 square meters)    Stage 2 Mild CKD (GFR = 60-89 mL/min/1 73 square meters)    Stage 3A Moderate CKD (GFR = 45-59 mL/min/1 73 square meters)    Stage 3B Moderate CKD (GFR = 30-44 mL/min/1 73 square meters)    Stage 4 Severe CKD (GFR = 15-29 mL/min/1 73 square meters)    Stage 5 End Stage CKD (GFR <15 mL/min/1 73 square meters)  Note: GFR calculation is accurate only with a steady state creatinine    NT-BNP PRO [[id number]]  (Abnormal) Collected: 04/21/22 1747    Lab Status: Final result Specimen: Blood from Arm, Right Updated: 04/21/22 1815     NT-proBNP 951 pg/mL     CBC and differential [[id number]]  (Abnormal) Collected: 04/21/22 1747    Lab Status: Final result Specimen: Blood from Arm, Right Updated: 04/21/22 1759     WBC 8 95 Thousand/uL      RBC 4 62 Million/uL      Hemoglobin 11 9 g/dL      Hematocrit 38 1 %      MCV 83 fL      MCH 25 8 pg      MCHC 31 2 g/dL      RDW 16 3 %      MPV 10 8 fL      Platelets 612 Thousands/uL      nRBC 0 /100 WBCs      Neutrophils Relative 79 %      Immat GRANS % 1 %      Lymphocytes Relative 12 %      Monocytes Relative 7 %      Eosinophils Relative 1 %      Basophils Relative 0 %      Neutrophils Absolute 7 06 Thousands/µL      Immature Grans Absolute 0 05 Thousand/uL      Lymphocytes Absolute 1 11 Thousands/µL      Monocytes Absolute 0 63 Thousand/µL      Eosinophils Absolute 0 07 Thousand/µL      Basophils Absolute 0 03 Thousands/µL                  X-ray chest 1 view portable   ED Interpretation by Natalya Gandhi MD (04/21 2006)   Chronic scarring right lung, unchanged from last portable chest x-ray  Final Result by Afua Nick MD (04/22 7305)      Persistent chronic right lung volume loss and pleural effusion                  Workstation performed: [id number]               Procedures  Procedures      ED Course  ED Course as of 05/01/22 2055   Thu Apr 21, 2022   1724 Procedure Note: EKG  Date/Time: 04/21/22 5:14 PM   Interpreted by: Antonia Gongora MD  Indications / Diagnosis: CP, SOB  ECG reviewed by me, the ED Physician: yes   The EKG demonstrates:  Sinus rhythm 84 bpm with 1st degree AV block  Right bundle branch block  Nonspecific ST-T wave changes  Replaces sinus tachycardia from ECG of 03/29/2022               HEART Risk Score      Most Recent Value   Heart Score Risk Calculator History 0 Filed at: 04/21/2022 2101   ECG 1 Filed at: 04/21/2022 2101   Age 2 Filed at: 04/21/2022 2101   Risk Factors 2 Filed at: 04/21/2022 2101   Troponin 1 Filed at: 04/21/2022 2101   HEART Score 6 Filed at: 04/21/2022 2101        Identification of Seniors at Risk      Most Recent Value   (ISAR) Identification of Seniors at Risk    Before the illness or injury that brought you to the Emergency, did you need someone to help you on a regular basis? 1 Filed at: 04/21/2022 1703   In the last 24 hours, have you needed more help than usual? 1 Filed at: 04/21/2022 1703   Have you been hospitalized for one or more nights during the past 6 months? 1 Filed at: 04/21/2022 1703   In general, do you see well? 0 Filed at: 04/21/2022 1703   In general, do you have serious problems with your memory? 0 Filed at: 04/21/2022 1703   Do you take more than three different medications every day? 1 Filed at: 04/21/2022 1703   ISAR Score 4 Filed at: 04/21/2022 1703                    SBIRT 20yo+      Most Recent Value   SBIRT (23 yo +)    In order to provide better care to our patients, we are screening all of our patients for alcohol and drug use  Would it be okay to ask you these screening questions? No Filed at: 04/21/2022 1729        MIKKI Risk Score      Most Recent Value   Age >= 72 1 Filed at: 04/21/2022 2200   Known CAD (stenosis >= 50%) 0 Filed at: 04/21/2022 2200   Recent (<=24 hrs) Service Angina 0 Filed at: 04/21/2022 2200   ST Deviation >= 0 5 mm 0 Filed at: 04/21/2022 2200   3+ CAD Risk Factors (FHx, HTN, HLP, DM, Smoker) 1 Filed at: 04/21/2022 2200   Aspirin Use Past 7 Days 0 Filed at: 04/21/2022 2200   Elevated Cardiac Markers 0 Filed at: 04/21/2022 2200   MIKKI Risk Score (Calculated) 2 Filed at: 04/21/2022 2200              MDM  Number of Diagnoses or Management Options  Chest pain  CHF (congestive heart failure) ([facility])  COPD exacerbation ([facility] [address] )  Dyspnea  Diagnosis management comments:  This is a 79 y/o male with hx of HTN, DM, CAD, COPD, and CHF presenting for evaluation of chest pain and worsening dyspnea that started at 0900 this morning  He describes his pain as a pressure across his anterior chest/sternum, nonradiating  He uses 2 L nasal cannula oxygen at home intermittently, but notes that since today he has had to more frequently use his nasal cannula oxygen  He has a chronic cough, which he reports is unchanged and at baseline  On exam he is tachypneic, with diminished breath sounds bilaterally and scattered wheezing  Bilateral lower extremity edema  Cardiac workup and will treat symptomatically with albuterol/ipratropium neb, steroids, and Lasix  Discussed indications for admission with the patient and he understands agrees  Case discussed with SLIM for admission  Disposition  Final diagnoses:   Chest pain   Dyspnea   COPD exacerbation ([facility] )   CHF (congestive heart failure) ([place] [address] )     Time reflects when diagnosis was documented in both MDM as applicable and the Disposition within this note     Time User Action Codes Description Comment    4/21/2022  9:00 PM Lacretia Gables Add [R07 9] Chest pain     4/21/2022  9:00 PM Lacretia Gables Add [R06 00] Dyspnea     4/21/2022  9:00 PM Lacretia Gables Add [J44 1] COPD exacerbation ([facility] )     4/21/2022  9:00 PM Lacretia Gables Add [I50 9] CHF (congestive heart failure) ([facility] )     4/22/2022  8:46 AM Anthony Stevens Add [E11 65,  Z79 4] Type 2 diabetes mellitus with hyperglycemia, with long-term current use of insulin ([facility] )     4/24/2022 12:03 PM Anthony Stevens Add [G47 06] Chronic diastolic heart failure [facility])       ED Disposition     ED Disposition Condition Date/Time Comment    Admit Stable Thu Apr 21, 2022  9:00 PM Case was discussed with Dr Eric Velasco, and the patient's admission status was agreed to be Admission Status: observation status to the service of Paul Schumacher          Follow-up Information     Follow up With Specialties Details Why [address], MD Family Medicine Schedule an appointment as soon as possible for a visit  [address]  RODRIGOLO Alaprabhjotma Katie Marrero MD Endocrinology, Internal Medicine Schedule an appointment as soon as possible for a visit  [facility]  [phone]            Discharge Medication List as of 4/24/2022 12:52 PM      CONTINUE these medications which have NOT CHANGED    Details   BD Insulin Syringe U-500 31G X 6MM 0 5 ML MISC USE 1 SYRINGE THREE TIMES A DAY FOR INJECTING INSULIN, Normal      cyclobenzaprine (FLEXERIL) 10 mg tablet Take 1 tablet (10 mg total) by mouth 3 (three) times a day for 10 days, Starting Wed 2/23/2022, Until Sat 3/5/2022, Normal      ferrous sulfate 325 (65 Fe) mg tablet Take 1 tablet (325 mg total) by mouth daily with breakfast, Starting Wed 2/23/2022, Normal      fluticasone-vilanterol (BREO ELLIPTA) 100-25 mcg/inh inhaler Inhale 1 puff daily Rinse mouth after use , Starting Mon 1/31/2022, Normal      furosemide (LASIX) 40 mg tablet Take 1 tablet (40 mg total) by mouth daily, Starting Sun 1/30/2022, Normal      glucose blood (OneTouch Verio) test strip May substitute brand based on insurance coverage   Check glucose QID , Normal      HUMULIN R 500 UNIT/ML CONCENTRATED injection INJECT 45 UNITS TWICE DAILY BEFORE BREAKFAST AND LUNCH, Normal      metoprolol tartrate (LOPRESSOR) 25 mg tablet TAKE ONE TABLET BY MOUTH TWICE A DAY, Normal      potassium chloride (K-DUR,KLOR-CON) 20 mEq tablet Take 1 tablet (20 mEq total) by mouth daily, Starting Sun 1/30/2022, Normal      simvastatin (ZOCOR) 40 mg tablet TAKE ONE TABLET BY MOUTH EVERY DAY, Normal      Trelegy Ellipta 100-62 5-25 MCG/INH inhaler INHALE 1 PUFF BY MOUTH DAILY *RINSE MOUTH AFTER USE*, Normal               PDMP Review       Value Time User    PDMP Reviewed  Yes 4/21/2022  9:19 PM Richardson Carmichael DO           ED Provider  Attending physically available and evaluated James Limon Divina Foote I managed the patient along with the ED Attending      Electronically Signed by         Addie Schroeder MD  05/01/22 2055

## 2022-04-22 LAB
ANION GAP SERPL CALCULATED.3IONS-SCNC: 8 MMOL/L (ref 4–13)
ATRIAL RATE: 84 BPM
BUN SERPL-MCNC: 22 MG/DL (ref 5–25)
CALCIUM SERPL-MCNC: 9 MG/DL (ref 8.3–10.1)
CHLORIDE SERPL-SCNC: 94 MMOL/L (ref 100–108)
CO2 SERPL-SCNC: 26 MMOL/L (ref 21–32)
CREAT SERPL-MCNC: 1.78 MG/DL (ref 0.6–1.3)
ERYTHROCYTE [DISTWIDTH] IN BLOOD BY AUTOMATED COUNT: 15.9 % (ref 11.6–15.1)
EST. AVERAGE GLUCOSE BLD GHB EST-MCNC: 217 MG/DL
GFR SERPL CREATININE-BSD FRML MDRD: 36 ML/MIN/1.73SQ M
GLUCOSE SERPL-MCNC: 237 MG/DL (ref 65–140)
GLUCOSE SERPL-MCNC: 345 MG/DL (ref 65–140)
GLUCOSE SERPL-MCNC: 371 MG/DL (ref 65–140)
GLUCOSE SERPL-MCNC: 495 MG/DL (ref 65–140)
GLUCOSE SERPL-MCNC: 617 MG/DL (ref 65–140)
GLUCOSE SERPL-MCNC: >500 MG/DL (ref 65–140)
HBA1C MFR BLD: 9.2 %
HCT VFR BLD AUTO: 40.8 % (ref 36.5–49.3)
HGB BLD-MCNC: 12.7 G/DL (ref 12–17)
MCH RBC QN AUTO: 25.5 PG (ref 26.8–34.3)
MCHC RBC AUTO-ENTMCNC: 31.1 G/DL (ref 31.4–37.4)
MCV RBC AUTO: 82 FL (ref 82–98)
P AXIS: 0 DEGREES
PLATELET # BLD AUTO: 156 THOUSANDS/UL (ref 149–390)
PMV BLD AUTO: 10.9 FL (ref 8.9–12.7)
POTASSIUM SERPL-SCNC: 4.8 MMOL/L (ref 3.5–5.3)
PR INTERVAL: 264 MS
QRS AXIS: 160 DEGREES
QRSD INTERVAL: 134 MS
QT INTERVAL: 386 MS
QTC INTERVAL: 456 MS
RBC # BLD AUTO: 4.98 MILLION/UL (ref 3.88–5.62)
SODIUM SERPL-SCNC: 128 MMOL/L (ref 136–145)
T WAVE AXIS: 55 DEGREES
VENTRICULAR RATE: 84 BPM
WBC # BLD AUTO: 7.78 THOUSAND/UL (ref 4.31–10.16)

## 2022-04-22 PROCEDURE — 99233 SBSQ HOSP IP/OBS HIGH 50: CPT | Performed by: INTERNAL MEDICINE

## 2022-04-22 PROCEDURE — 93010 ELECTROCARDIOGRAM REPORT: CPT | Performed by: INTERNAL MEDICINE

## 2022-04-22 PROCEDURE — 83036 HEMOGLOBIN GLYCOSYLATED A1C: CPT | Performed by: PHYSICIAN ASSISTANT

## 2022-04-22 PROCEDURE — 94640 AIRWAY INHALATION TREATMENT: CPT

## 2022-04-22 PROCEDURE — 80048 BASIC METABOLIC PNL TOTAL CA: CPT | Performed by: INTERNAL MEDICINE

## 2022-04-22 PROCEDURE — 82948 REAGENT STRIP/BLOOD GLUCOSE: CPT

## 2022-04-22 PROCEDURE — 99223 1ST HOSP IP/OBS HIGH 75: CPT | Performed by: INTERNAL MEDICINE

## 2022-04-22 PROCEDURE — 94760 N-INVAS EAR/PLS OXIMETRY 1: CPT

## 2022-04-22 PROCEDURE — 85027 COMPLETE CBC AUTOMATED: CPT | Performed by: INTERNAL MEDICINE

## 2022-04-22 RX ORDER — ALBUTEROL SULFATE 2.5 MG/3ML
2.5 SOLUTION RESPIRATORY (INHALATION) EVERY 4 HOURS PRN
Status: DISCONTINUED | OUTPATIENT
Start: 2022-04-22 | End: 2022-04-24 | Stop reason: HOSPADM

## 2022-04-22 RX ADMIN — INSULIN HUMAN 45 UNITS: 500 INJECTION, SOLUTION SUBCUTANEOUS at 19:58

## 2022-04-22 RX ADMIN — METOPROLOL TARTRATE 25 MG: 25 TABLET, FILM COATED ORAL at 09:52

## 2022-04-22 RX ADMIN — INSULIN LISPRO 3 UNITS: 100 INJECTION, SOLUTION INTRAVENOUS; SUBCUTANEOUS at 18:00

## 2022-04-22 RX ADMIN — CYCLOBENZAPRINE HYDROCHLORIDE 10 MG: 10 TABLET, FILM COATED ORAL at 17:57

## 2022-04-22 RX ADMIN — INSULIN HUMAN 45 UNITS: 500 INJECTION, SOLUTION SUBCUTANEOUS at 06:41

## 2022-04-22 RX ADMIN — IPRATROPIUM BROMIDE 0.5 MG: 0.5 SOLUTION RESPIRATORY (INHALATION) at 07:38

## 2022-04-22 RX ADMIN — IPRATROPIUM BROMIDE 0.5 MG: 0.5 SOLUTION RESPIRATORY (INHALATION) at 13:21

## 2022-04-22 RX ADMIN — FUROSEMIDE 40 MG: 40 TABLET ORAL at 09:54

## 2022-04-22 RX ADMIN — INSULIN LISPRO 4 UNITS: 100 INJECTION, SOLUTION INTRAVENOUS; SUBCUTANEOUS at 21:48

## 2022-04-22 RX ADMIN — Medication 325 MG: at 09:52

## 2022-04-22 RX ADMIN — FLUTICASONE FUROATE AND VILANTEROL TRIFENATATE 1 PUFF: 100; 25 POWDER RESPIRATORY (INHALATION) at 12:03

## 2022-04-22 RX ADMIN — CYCLOBENZAPRINE HYDROCHLORIDE 10 MG: 10 TABLET, FILM COATED ORAL at 21:48

## 2022-04-22 RX ADMIN — METOPROLOL TARTRATE 25 MG: 25 TABLET, FILM COATED ORAL at 17:59

## 2022-04-22 RX ADMIN — PRAVASTATIN SODIUM 80 MG: 80 TABLET ORAL at 17:59

## 2022-04-22 RX ADMIN — METOPROLOL TARTRATE 25 MG: 25 TABLET, FILM COATED ORAL at 01:14

## 2022-04-22 RX ADMIN — CYCLOBENZAPRINE HYDROCHLORIDE 10 MG: 10 TABLET, FILM COATED ORAL at 09:54

## 2022-04-22 RX ADMIN — INSULIN LISPRO 5 UNITS: 100 INJECTION, SOLUTION INTRAVENOUS; SUBCUTANEOUS at 12:03

## 2022-04-22 RX ADMIN — LEVALBUTEROL HYDROCHLORIDE 1.25 MG: 1.25 SOLUTION RESPIRATORY (INHALATION) at 19:56

## 2022-04-22 RX ADMIN — ENOXAPARIN SODIUM 40 MG: 40 INJECTION SUBCUTANEOUS at 09:51

## 2022-04-22 RX ADMIN — IPRATROPIUM BROMIDE 0.5 MG: 0.5 SOLUTION RESPIRATORY (INHALATION) at 19:56

## 2022-04-22 RX ADMIN — INSULIN HUMAN 45 UNITS: 500 INJECTION, SOLUTION SUBCUTANEOUS at 12:06

## 2022-04-22 RX ADMIN — INSULIN LISPRO 6 UNITS: 100 INJECTION, SOLUTION INTRAVENOUS; SUBCUTANEOUS at 06:41

## 2022-04-22 RX ADMIN — LEVALBUTEROL HYDROCHLORIDE 1.25 MG: 1.25 SOLUTION RESPIRATORY (INHALATION) at 13:21

## 2022-04-22 RX ADMIN — CYCLOBENZAPRINE HYDROCHLORIDE 10 MG: 10 TABLET, FILM COATED ORAL at 01:14

## 2022-04-22 RX ADMIN — PREDNISONE 40 MG: 20 TABLET ORAL at 09:52

## 2022-04-22 RX ADMIN — LEVALBUTEROL HYDROCHLORIDE 1.25 MG: 1.25 SOLUTION RESPIRATORY (INHALATION) at 07:38

## 2022-04-22 NOTE — ASSESSMENT & PLAN NOTE
· Possible mild exacerbation as patient note improvement with nebulizers and IV Solu-Medrol, however patient also received IV Lasix   · Will continue with nebulizers  · Anticipate sort course of oral prednisone  · Continue Breo  · Respiratory protocol, incentive spirometry, pulmonary toileting  · Continue supplemental oxygen, titrate as needed to maintain SaO2 at least 88%

## 2022-04-22 NOTE — RESPIRATORY THERAPY NOTE
RT Protocol Note  Monique Bolaños Sr  78 y o  male MRN: [id number]  Unit/Bed#: ED 29 Encounter: [id number]    Assessment    Principal Problem:    Shortness of breath  Active Problems:    Type 2 diabetes mellitus with hyperglycemia, with long-term current use of insulin (HCC)    COPD, severe (HCC)    Chronic diastolic heart failure (HCC)    History of lung cancer    Chronic respiratory failure with hypoxia (HCC)    CKD (chronic kidney disease) stage 3, GFR 30-59 ml/min (HCC)    Atypical chest pain      Home Pulmonary Medications:  Trelegy Ellipta 100-62 5-25mcg/ Albuterol PRN       Past Medical History:   Diagnosis Date    Abdominal pain     MARANDA (acute kidney injury) ([place] [address] ) 2018    Cardiac disease     [facility]     COPD, severe ([facility])     Coronary artery disease     DDD (degenerative disc disease), lumbar 2020    Diabetes mellitus ([place] [address] )     Dyspnea     GERD (gastroesophageal reflux disease)     Hyperlipidemia     Hypertension     MI (myocardial infarction) ([place] [address] )     with 3 stents    Nodule of apex of right lung     JACQUELINE (obstructive sleep apnea)     Prostate cancer ([place] [address] )      Social History     Socioeconomic History    Marital status:       Spouse name: None    Number of children: 1    Years of education: 6    Highest education level: None   Occupational History    None   Tobacco Use    Smoking status: Former Smoker     Packs/day: 1 50     Years: 50 00     Pack years: 75 00     Start date: 38     Quit date: 2020     Years since quittin 7    Smokeless tobacco: Never Used   Vaping Use    Vaping Use: Never used   Substance and Sexual Activity    Alcohol use: Not Currently    Drug use: No    Sexual activity: Never     Partners: Female   Other Topics Concern    None   Social History Narrative    Most recent tobacco use screenin2018    Do you currently or have you served in [employer]: No    Were you activated, into active duty, as a member of the [employer] or as a Reservist: No    Caffeine intake: Moderate    Alcohol intake: None    Marital status:     Number of children: 1    Education: 8    Occupation: retired    Sexual orientation: Heterosexual    General stress level: Medium    Live alone or with others: with others    Exercise level: None    Sexually active: No    Overweight: Yes    Obese: Yes    Guns present in home: No    Seat belts used routinely: Yes    Advance directive: No    Sunscreen used routinely: No    Smoke alarm in home: Yes    Legally blind in one or both eyes: No    Hard of hearing or deaf in one or both ears: No     Social Determinants of Health     Financial Resource Strain: Not on file   Food Insecurity: No Food Insecurity    Worried About Running Out of Food in the Last Year: Never true    Kenyatta of Food in the Last Year: Never true   Transportation Needs: No Transportation Needs    Lack of Transportation (Medical): No    Lack of Transportation (Non-Medical): No   Physical Activity: Not on file   Stress: Not on file   Social Connections: Not on file   Intimate Partner Violence: Not on file   Housing Stability: Low Risk     Unable to Pay for Housing in the Last Year: No    Number of Places Lived in the Last Year: 1    Unstable Housing in the Last Year: No       Subjective         Objective    Physical Exam:   Assessment Type: Assess only  General Appearance: Alert,Awake  Respiratory Pattern: Accessory muscle use  Chest Assessment: Chest expansion symmetrical  Bilateral Breath Sounds: Diminished  Cough: None  O2 Device: NC    Vitals:  Blood pressure 152/83, pulse 77, temperature 98 9 °F (37 2 °C), temperature source Oral, resp  rate 22, weight 104 kg (230 lb), SpO2 96 %  Imaging and other studies: I have personally reviewed pertinent films in PACS      04/22/22 0008   Respiratory Protocol   Protocol Initiated? Yes   Protocol Selection Respiratory   Language Barrier?  No   Medical & Social History Reviewed? Yes   Diagnostic Studies Reviewed? Yes   Physical Assessment Performed? Yes   Respiratory Plan Mild Distress pathway   Respiratory Assessment   Assessment Type Assess only   General Appearance Alert; Awake   Respiratory Pattern Accessory muscle use   Chest Assessment Chest expansion symmetrical   Bilateral Breath Sounds Diminished   Cough None   Resp Comments Patient presents to E  D  with SOB/chest pain  He is currently on nasal cannula 4 L/min, states his breathing is improved  He has multiple medical maladies including oxygen dependent COPD and hx lung CA  He uses trelegly ellipta for maintenance and albuterol nebulizer for rescue  He uses 2-3 L/min of oxygen at home  Will provide him with tid xopenex/atrovent, as needed albuterol and wean oxygen to baseline  O2 Device NC   Additional Assessments   SpO2 96 %       O2 Device: NC     Plan    Respiratory Plan: Mild Distress pathway        Resp Comments: Patient presents to E  D  with SOB/chest pain  He is currently on nasal cannula 4 L/min, states his breathing is improved  He has multiple medical maladies including oxygen dependent COPD and hx lung CA  He uses trelegly ellipta for maintenance and albuterol nebulizer for rescue  He uses 2-3 L/min of oxygen at home  Will provide him with tid xopenex/atrovent, as needed albuterol and wean oxygen to baseline

## 2022-04-22 NOTE — ASSESSMENT & PLAN NOTE
Lab Results   Component Value Date    HGBA1C 8 8 (H) 02/21/2022       Recent Labs     04/22/22  0610 04/22/22  0813 04/22/22  1129   POCGLU >500* 495* 345*       Blood Sugar Average: Last 72 hrs:  · (P) 420   · Uncontrolled   Update HbA1c  · Endocrinology consulted  · Suspect element of steroid-induced hyperglycemia

## 2022-04-22 NOTE — ASSESSMENT & PLAN NOTE
Lab Results   Component Value Date    EGFR 47 04/21/2022    EGFR 47 03/29/2022    EGFR 41 02/22/2022    CREATININE 1 43 (H) 04/21/2022    CREATININE 1 44 (H) 03/29/2022    CREATININE 1 62 (H) 02/22/2022   · Creatinine near recent baseline, monitor with BMP

## 2022-04-22 NOTE — ED ATTENDING ATTESTATION
4/21/2022  IGabbie MD, saw and evaluated the patient  I have discussed the patient with the resident/non-physician practitioner and agree with the resident's/non-physician practitioner's findings, Plan of Care, and MDM as documented in the resident's/non-physician practitioner's note, except where noted  All available labs and Radiology studies were reviewed  I was present for key portions of any procedure(s) performed by the resident/non-physician practitioner and I was immediately available to provide assistance  At this point I agree with the current assessment done in the Emergency Department  I have conducted an independent evaluation of this patient a history and physical is as follows:    ED Course     81-year-old male, history congestive heart failure and COPD, presenting to the emergency department for evaluation precordial chest pressure and worsening shortness of breath that started approximately 09:00 o'clock this morning  Patient reports that he has no change in his baseline cough  Patient denies any fever  No abdominal pain, nausea, vomiting, diarrhea  Ten systems reviewed and negative except as noted  On examination patient is noted to be mildly tachypneic  Patient is on baseline 2 L nasal cannula oxygen  Head is normocephalic atraumatic  Eyelids lashes are normal   No conjunctival icterus  Mucous membranes are dry  Neck is supple without meningismus  Lungs sounds are diminished throughout  Expiratory wheezing is noted  Heart is regular rate rhythm with no murmurs rubs or gallops  Abdomen is obese, soft, nontender  Extremities with trace bilateral lower extremity edema  Patient with chest discomfort and shortness of breath  Found to be wheezing with diminished lung sounds consistent with COPD exacerbation      Labs Reviewed   CBC AND DIFFERENTIAL - Abnormal       Result Value Ref Range Status    WBC 8 95  4 31 - 10 16 Thousand/uL Final    RBC 4 62  3 88 - 5 62 Million/uL Final    Hemoglobin 11 9 (*) 12 0 - 17 0 g/dL Final    Hematocrit 38 1  36 5 - 49 3 % Final    MCV 83  82 - 98 fL Final    MCH 25 8 (*) 26 8 - 34 3 pg Final    MCHC 31 2 (*) 31 4 - 37 4 g/dL Final    RDW 16 3 (*) 11 6 - 15 1 % Final    MPV 10 8  8 9 - 12 7 fL Final    Platelets 714  373 - 390 Thousands/uL Final    nRBC 0  /100 WBCs Final    Neutrophils Relative 79 (*) 43 - 75 % Final    Immat GRANS % 1  0 - 2 % Final    Lymphocytes Relative 12 (*) 14 - 44 % Final    Monocytes Relative 7  4 - 12 % Final    Eosinophils Relative 1  0 - 6 % Final    Basophils Relative 0  0 - 1 % Final    Neutrophils Absolute 7 06  1 85 - 7 62 Thousands/µL Final    Immature Grans Absolute 0 05  0 00 - 0 20 Thousand/uL Final    Lymphocytes Absolute 1 11  0 60 - 4 47 Thousands/µL Final    Monocytes Absolute 0 63  0 17 - 1 22 Thousand/µL Final    Eosinophils Absolute 0 07  0 00 - 0 61 Thousand/µL Final    Basophils Absolute 0 03  0 00 - 0 10 Thousands/µL Final   BASIC METABOLIC PANEL - Abnormal    Sodium 135 (*) 136 - 145 mmol/L Final    Potassium 4 7  3 5 - 5 3 mmol/L Final    Chloride 103  100 - 108 mmol/L Final    CO2 30  21 - 32 mmol/L Final    ANION GAP 2 (*) 4 - 13 mmol/L Final    BUN 19  5 - 25 mg/dL Final    Creatinine 1 43 (*) 0 60 - 1 30 mg/dL Final    Comment: Standardized to IDMS reference method    Glucose 329 (*) 65 - 140 mg/dL Final    Comment: If the patient is fasting, the ADA then defines impaired fasting glucose as > 100 mg/dL and diabetes as > or equal to 123 mg/dL  Specimen collection should occur prior to Sulfasalazine administration due to the potential for falsely depressed results  Specimen collection should occur prior to Sulfapyridine administration due to the potential for falsely elevated results      Calcium 8 6  8 3 - 10 1 mg/dL Final    eGFR 47  ml/min/1 73sq m Final    Narrative:     [place] guidelines for Chronic Kidney Disease (CKD):     Stage 1 with normal or high GFR (GFR > 90 mL/min/1 73 square meters)    Stage 2 Mild CKD (GFR = 60-89 mL/min/1 73 square meters)    Stage 3A Moderate CKD (GFR = 45-59 mL/min/1 73 square meters)    Stage 3B Moderate CKD (GFR = 30-44 mL/min/1 73 square meters)    Stage 4 Severe CKD (GFR = 15-29 mL/min/1 73 square meters)    Stage 5 End Stage CKD (GFR <15 mL/min/1 73 square meters)  Note: GFR calculation is accurate only with a steady state creatinine   NT-BNP PRO (BRAIN NATRIURETIC PEPTIDE) - Abnormal    NT-proBNP 951 (*) <125 pg/mL Final   COVID19, INFLUENZA A/B, RSV PCR, [facility] - Normal    SARS-CoV-2 Negative  Negative Final    Comment:      INFLUENZA A PCR Negative  Negative Final    Comment:      INFLUENZA B PCR Negative  Negative Final    Comment:      RSV PCR Negative  Negative Final    Comment:      Narrative:     FOR PEDIATRIC PATIENTS - copy/paste COVID Guidelines URL to browser: [email or web address]  [employer]    SARS-CoV-2 assay is a Nucleic Acid Amplification assay intended for the  qualitative detection of nucleic acid from SARS-CoV-2 in nasopharyngeal  swabs  Results are for the presumptive identification of SARS-CoV-2 RNA  Positive results are indicative of infection with SARS-CoV-2, the virus  causing COVID-19, but do not rule out bacterial infection or co-infection  with other viruses  Laboratories within the [place] and its  territories are required to report all positive results to the appropriate  public health authorities  Negative results do not preclude SARS-CoV-2  infection and should not be used as the sole basis for treatment or other  patient management decisions  Negative results must be combined with  clinical observations, patient history, and epidemiological information  This test has not been FDA cleared or approved  This test has been authorized by FDA under an Emergency Use Authorization  (EUA)   This test is only authorized for the duration of time the  declaration that circumstances exist justifying the authorization of the  emergency use of an in vitro diagnostic tests for detection of SARS-CoV-2  virus and/or diagnosis of COVID-19 infection under section 564(b)(1) of  the Act, 21 U  S C  610TVP-7(B)(1), unless the authorization is terminated  or revoked sooner  The test has been validated but independent review by FDA  and CLIA is pending  Test performed using [facility] GeneXpert: This RT-PCR assay targets N2,  a region unique to SARS-CoV-2  A conserved region in the E-gene was chosen  for pan-Sarbecovirus detection which includes SARS-CoV-2  HS TROPONIN I 0HR - Normal    hs TnI 0hr 15  "Refer to ACS Flowchart"- see link ng/L Final    Comment:                                              Initial (time 0) result  If >=50 ng/L, Myocardial injury suggested ;  Type of myocardial injury and treatment strategy  to be determined  If 5-49 ng/L, a delta result at 2 hours and or 4 hours will be needed to further evaluate  If <4 ng/L, and chest pain has been >3 hours since onset, patient may qualify for discharge based on the HEART score in the ED  If <5 ng/L and <3hours since onset of chest pain, a delta result at 2 hours will be needed to further evaluate  HS Troponin 99th Percentile URL of a Health Population=12 ng/L with a 95% Confidence Interval of 8-18 ng/L  Second Troponin (time 2 hours)  If calculated delta >= 20 ng/L,  Myocardial injury suggested ; Type of myocardial injury and treatment strategy to be determined  If 5-49 ng/L and the calculated delta is 5-19 ng/L, consult medical service for evaluation  Continue evaluation for ischemia on ecg and other possible etiology and repeat hs troponin at 4 hours  If delta is <5 ng/L at 2 hours, consider discharge based on risk stratification via the HEART score (if in ED), or MIKKI risk score in IP/Observation      HS Troponin 99th Percentile URL of a Health Population=12 ng/L with a 95% Confidence Interval of 8-18 ng/L    HS TROPONIN I 2HR - Normal    hs TnI 2hr 14  "Refer to ACS Flowchart"- see link ng/L Final    Comment:                                              Initial (time 0) result  If >=50 ng/L, Myocardial injury suggested ;  Type of myocardial injury and treatment strategy  to be determined  If 5-49 ng/L, a delta result at 2 hours and or 4 hours will be needed to further evaluate  If <4 ng/L, and chest pain has been >3 hours since onset, patient may qualify for discharge based on the HEART score in the ED  If <5 ng/L and <3hours since onset of chest pain, a delta result at 2 hours will be needed to further evaluate  HS Troponin 99th Percentile URL of a Health Population=12 ng/L with a 95% Confidence Interval of 8-18 ng/L  Second Troponin (time 2 hours)  If calculated delta >= 20 ng/L,  Myocardial injury suggested ; Type of myocardial injury and treatment strategy to be determined  If 5-49 ng/L and the calculated delta is 5-19 ng/L, consult medical service for evaluation  Continue evaluation for ischemia on ecg and other possible etiology and repeat hs troponin at 4 hours  If delta is <5 ng/L at 2 hours, consider discharge based on risk stratification via the HEART score (if in ED), or MIKKI risk score in IP/Observation  HS Troponin 99th Percentile URL of a Health Population=12 ng/L with a 95% Confidence Interval of 8-18 ng/L  Delta 2hr hsTnI -1  <20 ng/L Final       X-ray chest 1 view portable   ED Interpretation   Chronic scarring right lung, unchanged from last portable chest x-ray              Critical Care Time  Procedures

## 2022-04-22 NOTE — ASSESSMENT & PLAN NOTE
· On 2-3 L NC continuously in setting of prior lung cancer s/p lung surgery, COPD, CHF  · Continue supplemental oxygen, titrate as needed to maintain SaO2 greater than 88%  · On baseline oxygen requirements

## 2022-04-22 NOTE — ASSESSMENT & PLAN NOTE
· On 2-3 L NC continuously in setting of prior lung cancer s/p lung surgery, COPD, CHF  · Continue supplemental oxygen, titrate as needed to maintain SaO2 greater than 88%

## 2022-04-22 NOTE — ASSESSMENT & PLAN NOTE
· Increased shortness of breath developing today not responding to home nebulizers  Did note slight increase in cough  · Possible mild COPD exacerbation versus secondary to chest wall pain    Patient did note some improvement in symptoms with nebulizer treatment and IV Solu-Medrol  · Workup of chest pain as below  · Initiate short course of prednisone, continue with nebulizers, Breo  · Respiratory protocol, incentive spirometry  · Continue supplemental oxygen, titrate as needed to maintain SaO2 greater than 88%

## 2022-04-22 NOTE — UTILIZATION REVIEW
Initial Clinical Review  Observation on 04/21 @ 2119 upgraded to Inpatient on 04/22 @ 1611  Pt requiring continued stay d/t hyperglycemia and need to monitor symptoms     Admission: Date/Time/Statement:   Admission Orders (From admission, onward)     Ordered        04/22/22 1611  Inpatient Admission  Once            04/21/22 2119  Place in Observation  Once                      Orders Placed This Encounter   Procedures    Inpatient Admission     Standing Status:   Standing     Number of Occurrences:   1     Order Specific Question:   Level of Care     Answer:   Med Surg [16]     Order Specific Question:   Estimated length of stay     Answer:   More than 2 Midnights     Order Specific Question:   Certification     Answer:   I certify that inpatient services are medically necessary for this patient for a duration of greater than two midnights  See H&P and MD Progress Notes for additional information about the patient's course of treatment  ED Arrival Information     Expected Arrival Acuity    - 4/21/2022 16:51 Urgent         Means of arrival Escorted by Service Admission type    [facility] Urgent         Arrival complaint    Chest pain, Shortness of breath         Chief Complaint   Patient presents with    Chest Pain     Pt began with chest pain this morning  Stated that the pains have been getting worse, but he put off coming into the ED  Pain is across his chest   Pt feels SOB, also began this morning  Initial Presentation: 78 y o  male from home presented to the ED as a walk in with chest pain and sob  PMH of adenocarcinoma of lung s/p SBRT 12/2020, COPD, chronic diastolic heart failure, chronic respiratory failure on 2-3 L NC continuously,T2DM on insulin therapy, CKD stage 3  Pt woke up this am with anterior chest pain radiating from right to left side of chest, constant, not worsened with exertion   He also has sob that worsened with exertion, trialed nebulizer at home with no improvement  Reports does not weigh self consistently and only takes Lasix 1x/day  In the ED, trop x 2 negative and EKG without acute ischemic changes  Received nebulizers x2 and Solu-Medrol with some improvement in sob and chest pain but with symptoms still persisted  Admit as observation level of care for shortness of breath, atypical chest pain: Initiate short course of prednisone, continue with nebulizers, Breo  incentive spirometry  Continue supplemental oxygen, titrate as needed to maintain SaO2 greater than 88%    Repeat troponin/EKG if chest pain should markedly increased  Trial dose of IV Lasix   Monitor daily weights, I/O volume status  04/22 Endocrinology Consult: DM 2 uncontrolled long-term insulin use  Pt is on prednisone d/t COPD exacerbation  Currently on regular insulin U- 500 45 units BID  is having episodes of hyperglycemia , steroid induced, recommend to add U 500 regular insulin 45  units before dinner  Recommend to start U500 regular insulin 45 units t i d  before meals  If patient is still having  hyperglycemia tomorrow will consider to add another does of U-500  And change to  q 6 hours  optimal glucose range 140 to 180 continue with Accu-Chcks    Date: 04/22   Day 2:   IM Notes: Pt report dizziness this am with low blood sugar  Reports CP and SOB resolved  Insulin regimen per Endocrinology recs  Cont Prednisone, nebs, IS, pulmonary toileting  Cont po lasix, supplemental O2, titrate as needed to maintain SaO2 at least 88%  I/O, daily weights  PE:Pt AAO x 3  Currently on 2 5 O2 NC  Pulmonary effort normal, normal BS       ED Triage Vitals   Temperature Pulse Respirations Blood Pressure SpO2   04/21/22 1700 04/21/22 1700 04/21/22 1700 04/21/22 1700 04/21/22 1700   98 9 °F (37 2 °C) (!) 107 22 (!) 174/82 97 %      Temp Source Heart Rate Source Patient Position - Orthostatic VS BP Location FiO2 (%)   04/21/22 1700 04/21/22 1700 04/21/22 1800 04/21/22 1800 --   Oral Monitor Lying Right arm       Pain Score       04/21/22 1700       8          Wt Readings from Last 1 Encounters:   04/23/22 98 9 kg (218 lb 0 6 oz)     Additional Vital Signs:   Date/Time Temp Pulse Resp BP MAP (mmHg) SpO2 Calculated FIO2 (%) - Nasal Cannula Nasal Cannula O2 Flow Rate (L/min) O2 Device Patient Position - Orthostatic VS   04/23/22 15:26:13 98 1 °F (36 7 °C) -- 18 136/57 83 -- -- -- -- --   04/23/22 1303 -- -- 18 -- -- -- -- -- -- --   04/23/22 1300 -- -- -- -- -- 97 % -- -- -- --   04/23/22 08:27:47 -- 75 18 100/56 71 98 % 30 2 5 L/min Nasal cannula --   04/23/22 07:59:14 98 6 °F (37 °C) -- -- 97/58 71 -- -- -- -- --   04/23/22 0723 -- -- 18 -- -- 97 % 30 2 5 L/min Nasal cannula --   04/23/22 0500 -- -- -- -- -- -- 30 2 5 L/min Nasal cannula --   04/22/22 22:23:51 98 °F (36 7 °C) 85 17 118/59 79 97 % -- -- -- Lying   04/22/22 2100 -- -- -- -- -- -- 32 3 L/min Nasal cannula --   04/22/22 1759 -- 86 -- 115/55 -- -- -- -- -- --   04/22/22 14:17:14 98 2 °F (36 8 °C) -- -- 112/52 72 -- -- -- -- --   04/22/22 1322 -- 74 18 -- -- 97 % -- -- -- --   04/22/22 11:30:11 98 4 °F (36 9 °C) 82 18 115/52 73 99 % -- -- -- Lying   04/22/22 0952 -- 81 -- 152/68 -- -- -- -- -- --   04/22/22 0800 -- -- -- -- -- -- 32 3 L/min Nasal cannula --   04/22/22 0740 -- 70 20 -- -- 99 % 32 3 L/min Nasal cannula --     Date/Time Temp Pulse Resp BP MAP (mmHg) SpO2 Calculated FIO2 (%) - Nasal Cannula Nasal Cannula O2 Flow Rate (L/min) O2 Device Patient Position - Orthostatic VS   04/22/22 0740 -- 70 20 -- -- 99 % 32 3 L/min Nasal cannula --   04/22/22 06:13:01 97 5 °F (36 4 °C) -- -- 122/78 93 -- -- -- -- --   04/22/22 06:12:55 97 5 °F (36 4 °C) -- -- 122/78 93 -- -- -- -- --   04/22/22 03:47:12 98 °F (36 7 °C) 67 -- 113/82 92 100 % -- -- -- --   04/22/22 03:46:44 98 °F (36 7 °C) -- -- 113/82 92 -- -- -- -- --   04/22/22 0047 -- 82 22 151/65 -- 98 % -- -- Nasal cannula Lying   04/22/22 0008 -- -- -- -- -- 96 % 36 4 L/min Nasal cannula --   04/21/22 2045 -- 77 22 152/83 -- 96 % 28 2 L/min Nasal cannula --   04/21/22 1800 -- 88 20 183/87 Abnormal  125 100 % -- -- Nasal cannula Lying   04/21/22 1754 -- -- -- -- -- -- -- -- Nasal cannula --       Pertinent Labs/Diagnostic Test Results:   X-ray chest 1 view portable   ED Interpretation by Mita Rodrigez MD (04/21 2006)   Chronic scarring right lung, unchanged from last portable chest x-ray        Final Result by Paloma Menjivar MD (04/22 0828)      Persistent chronic right lung volume loss and pleural effusion                  Workstation performed: [id number]           04/21 EKG result: Sinus rhythm with 1st degree A-V block  Right bundle branch block  Results from last 7 days   Lab Units 04/21/22  1747   SARS-COV-2  Negative     Results from last 7 days   Lab Units 04/22/22  0357 04/21/22  1747   WBC Thousand/uL 7 78 8 95   HEMOGLOBIN g/dL 12 7 11 9*   HEMATOCRIT % 40 8 38 1   PLATELETS Thousands/uL 156 155   NEUTROS ABS Thousands/µL  --  7 06         Results from last 7 days   Lab Units 04/23/22  0633 04/22/22  0357 04/21/22  1747   SODIUM mmol/L 142 128* 135*   POTASSIUM mmol/L 4 3 4 8 4 7   CHLORIDE mmol/L 104 94* 103   CO2 mmol/L 36* 26 30   ANION GAP mmol/L 2* 8 2*   BUN mg/dL 28* 22 19   CREATININE mg/dL 1 32* 1 78* 1 43*   EGFR ml/min/1 73sq m 52 36 47   CALCIUM mg/dL 9 3 9 0 8 6         Results from last 7 days   Lab Units 04/23/22  1153 04/23/22  0714 04/23/22  0649 04/23/22  0623 04/22/22  2018 04/22/22  1628 04/22/22  1129 04/22/22  0813 04/22/22  0610   POC GLUCOSE mg/dl 300* 122 74 42* 371* 237* 345* 495* >500*     Results from last 7 days   Lab Units 04/23/22  0633 04/22/22  0357 04/21/22  1747   GLUCOSE RANDOM mg/dL 49* 617* 329*         Results from last 7 days   Lab Units 04/22/22  0357   HEMOGLOBIN A1C % 9 2*   EAG mg/dl 217     BETA-HYDROXYBUTYRATE   Date Value Ref Range Status   06/15/2019 0 2 <0 6 mmol/L Final        Results from last 7 days   Lab Units 04/21/22  6041 04/21/22  1747   HS TNI 0HR ng/L  --  15   HS TNI 2HR ng/L 14  --    HSTNI D2 ng/L -1  --      Results from last 7 days   Lab Units 04/21/22  2223   PROCALCITONIN ng/ml 0 10     Results from last 7 days   Lab Units 04/21/22  1747   NT-PRO BNP pg/mL 951*     Results from last 7 days   Lab Units 04/21/22  1747   INFLUENZA A PCR  Negative   INFLUENZA B PCR  Negative   RSV PCR  Negative     ED Treatment:   Medication Administration from 04/21/2022 1651 to 04/22/2022 0329       Date/Time Order Dose Route Action     04/21/2022 1747 albuterol inhalation solution 5 mg 5 mg Nebulization Given     04/21/2022 1747 ipratropium (ATROVENT) 0 02 % inhalation solution 0 5 mg 0 5 mg Nebulization Given     04/21/2022 1751 methylPREDNISolone sodium succinate (Solu-MEDROL) injection 80 mg 80 mg Intravenous Given     04/21/2022 1939 albuterol inhalation solution 5 mg 5 mg Nebulization Given     04/22/2022 0114 cyclobenzaprine (FLEXERIL) tablet 10 mg 10 mg Oral Given     04/22/2022 0114 metoprolol tartrate (LOPRESSOR) tablet 25 mg 25 mg Oral Given     04/21/2022 2200 levalbuterol (XOPENEX) inhalation solution 1 25 mg   Nebulization Canceled Entry     04/21/2022 2200 sodium chloride 0 9 % inhalation solution 3 mL   Nebulization Canceled Entry     04/21/2022 2200 ipratropium (ATROVENT) 0 02 % inhalation solution 0 5 mg   Nebulization Canceled Entry     04/22/2022 0050 insulin lispro (HumaLOG) 100 units/mL subcutaneous injection 1-5 Units 0 Units Subcutaneous Hold     04/21/2022 2224 furosemide (LASIX) injection 40 mg 40 mg Intravenous Given        Past Medical History:   Diagnosis Date    Abdominal pain     [facility] 6[facility]19/2018    Cardiac disease     [facility]     COPD, severe [facility]     Coronary artery disease     DDD (degenerative disc disease), lumbar 9/1/2020    Diabetes mellitus ([facility]     Dyspnea     GERD (gastroesophageal reflux disease)     Hyperlipidemia     Hypertension  MI [facility] infarction) [facility]     with 3 stents    Nodule of apex of right lung     JACQUELINE (obstructive sleep apnea)     Prostate cancer (HCC)      Present on Admission:   Shortness of breath   Chronic diastolic heart failure ([facility])   Chronic respiratory failure with hypoxia ([facility])   COPD, severe ([facility])   Atypical chest pain   CKD (chronic kidney disease) stage 3, GFR 30-59 ml/min ([facility])      Admitting Diagnosis: CHF (congestive heart failure) ([facility]) [I50 9]  Chest pain [R07 9]  Dyspnea [R06 00]  COPD exacerbation ([facility]) [J44 1]  Age/Sex: 78 y o  male  Admission Orders:  SCD  Continuous cardiac monitoring  Continuous pulse ox    Scheduled Medications:  cyclobenzaprine, 10 mg, Oral, TID  enoxaparin, 40 mg, Subcutaneous, Daily  ferrous sulfate, 325 mg, Oral, Daily With Breakfast  fluticasone-vilanterol, 1 puff, Inhalation, Daily  furosemide, 40 mg, Oral, Daily  insulin lispro, 1-6 Units, Subcutaneous, HS  insulin lispro, 2-12 Units, Subcutaneous, TID AC  insulin regular, 35 Units, Subcutaneous, Daily With Dinner  insulin regular, 45 Units, Subcutaneous, Daily With Lunch  [START ON 4/24/2022] insulin regular, 45 Units, Subcutaneous, Daily With Breakfast  ipratropium, 0 5 mg, Nebulization, TID  levalbuterol, 1 25 mg, Nebulization, TID  metoprolol tartrate, 25 mg, Oral, BID  pravastatin, 80 mg, Oral, Daily With Dinner  predniSONE, 40 mg, Oral, Daily      Continuous IV Infusions: none     PRN Meds:  albuterol, 2 5 mg, Nebulization, Q4H PRN  sodium chloride (PF), 3 mL, Intravenous, Q1H PRN        IP CONSULT TO ENDOCRINOLOGY    Network Utilization Review Department  ATTENTION: Please call with any questions or concerns to [phone] and carefully listen to the prompts so that you are directed to the right person   All voicemails are confidential   Sammie Bill all requests for admission clinical reviews, approved or denied determinations and any other requests to dedicated fax number below belonging to the campus where the patient is receiving treatment   List of dedicated fax numbers for the Facilities:  [address] [place] [address] DENIALS (Administrative/Medical Necessity) [phone]   [address] (Maternity/NICU/Pediatrics) [phone]   [address] [place] [address]  [address] [facility] [place] [address] [address] [place] [address] [address] [facility] [phone]

## 2022-04-22 NOTE — ASSESSMENT & PLAN NOTE
· Patient noting some increased shortness of breath however denies significant productive cough/wheezing  · Did note improvement with nebulizers and IV Solu-Medrol  · Will continue with nebulizers, transition steroids to oral prednisone  · Continue Breo  · Respiratory protocol, incentive spirometry, pulmonary toileting  · Continue supplemental oxygen, titrate as needed to maintain SaO2 at least 88%

## 2022-04-22 NOTE — H&P
[address] [facility]  H&P- David Duckworth Sr  1945, 78 y o  male MRN: [id number]  Unit/Bed#: Lo  Encounter: [id number]  Primary Care Provider: Jane Singer MD   Date and time admitted to hospital: 4/21/2022  4:56 PM    * Shortness of breath  Assessment & Plan  · Increased shortness of breath developing today not responding to home nebulizers  Did note slight increase in cough  · Possible mild COPD exacerbation versus secondary to chest wall pain  Patient did note some improvement in symptoms with nebulizer treatment and IV Solu-Medrol  · Workup of chest pain as below  · Initiate short course of prednisone, continue with nebulizers, Breo  · Respiratory protocol, incentive spirometry  · Continue supplemental oxygen, titrate as needed to maintain SaO2 greater than 88%    Atypical chest pain  Assessment & Plan  · MIKKI 2 on admission  · Presenting with anterior chest pain, slightly reproducible on palpation, improved from presentation  · Troponin x2 negative, EKG without acute ischemic change  · Suspect secondary to shortness of breath and possible costochondritis  Repeat troponin/EKG if chest pain should markedly increased    Consider outpatient stress testing    COPD, severe ([facility] [address] )  Assessment & Plan  · Patient noting some increased shortness of breath however denies significant productive cough/wheezing  · Did note improvement with nebulizers and IV Solu-Medrol  · Will continue with nebulizers, transition steroids to oral prednisone  · Continue Breo  · Respiratory protocol, incentive spirometry, pulmonary toileting  · Continue supplemental oxygen, titrate as needed to maintain SaO2 at least 88%    CKD (chronic kidney disease) stage 3, GFR 30-59 ml/min [facility])  Assessment & Plan  Lab Results   Component Value Date    EGFR 47 04/21/2022    EGFR 47 03/29/2022    EGFR 41 02/22/2022    CREATININE 1 43 (H) 04/21/2022    CREATININE 1 44 (H) 03/29/2022    CREATININE 1 62 (H) 02/22/2022 · Creatinine near recent baseline, monitor with BMP    Chronic respiratory failure with hypoxia (HCC)  Assessment & Plan  · On 2-3 L NC continuously in setting of prior lung cancer s/p lung surgery, COPD, CHF  · Continue supplemental oxygen, titrate as needed to maintain SaO2 greater than 88%    History of lung cancer  Assessment & Plan  · Stage IA2 per chart review s/p SBRT to RUL 12/31/2020  · CT chest January 2022 without acute findings  Needs ongoing outpatient follow-up    Chronic diastolic heart failure (HCC)  Assessment & Plan  Wt Readings from Last 3 Encounters:   04/21/22 104 kg (230 lb)   02/22/22 100 kg (220 lb 10 9 oz)   01/30/22 107 kg (236 lb 12 4 oz)     · Reports he is not compliant with twice daily dosing of Lasix, takes only once daily  Denies worsening edema or apparent weight gain but admits he does not weigh himself daily  · Trial dose of IV Lasix given some ongoing shortness of breath  Hopefully can transition back to oral tomorrow  · Monitor daily weights, I/O volume status        Type 2 diabetes mellitus with hyperglycemia, with long-term current use of insulin ([facility])  Assessment & Plan  Lab Results   Component Value Date    HGBA1C 8 8 (H) 02/21/2022       No results for input(s): POCGLU in the last 72 hours  Blood Sugar Average: Last 72 hrs:  ·  not controlled as per recent A1c  · For now continue insulin regular RU-500 45 units twice daily with correctional insulin/Accu-Cheks  · Carb controlled diet  · Initiate hypoglycemia protocol      VTE Prophylaxis: Enoxaparin (Lovenox)  / sequential compression device   Code Status: Level 1 - Full Code  POLST: POLST form is not discussed and not completed at this time  Anticipated Length of Stay:  Patient will be admitted on an Observation basis with an anticipated length of stay of  less than 2 midnights  Justification for Hospital Stay: Please see detailed plans noted above      Chief Complaint:     Chest pain and shortness of breath  History of Present Illness:  Jossie Kowalski Sr  is a 78 y o  male who presented for evaluation of chest pain and shortness of breath developing today  Has a past medical history significant for adenocarcinoma of lung s/p SBRT 12/2020, COPD, chronic diastolic heart failure, chronic respiratory failure on 2-3 L NC continuously, type 2 diabetes on insulin therapy, chronic kidney disease stage 3  Patient states he was in his normal state of health until this morning when he awoke with anterior chest pain radiating from right to left side of chest, constant, not worsened with exertion, and without associated nausea/vomiting or diaphoresis  He additionally did note some increased shortness of breath slightly worsened with exertion for which he trialed nebulizer at home without improvement, thus he presented for evaluation  He denies any fevers/chills, known sick contacts, recent travel, dizziness/lightheadedness, palpitations, worsening edema, productive cough/wheezing, orthopnea/PND  Does not believe he gained any weight, but admits he is not consistent with waiting himself and admits he only takes his Lasix once a day  During ED evaluation troponin x2 were noted to be negative and EKG without acute ischemic change  He received nebulizers x2 and Solu-Medrol with some improvement in shortness of breath and chest pain but with symptoms still persisted thus he is admitted as observation for further management      Review of Systems:    Constitutional:  Denies fever or chills   Eyes:  Denies change in visual acuity   HENT:  Denies nasal congestion or sore throat   Respiratory:  Denies cough but reported shortness of breath  Cardiovascular:  Denies edema but reported chest pain  GI:  Denies abdominal pain, nausea, vomiting, bloody stools or diarrhea   :  Denies dysuria   Musculoskeletal:  Denies back pain or joint pain   Integument:  Denies rash   Neurologic:  Denies headache, focal weakness or sensory changes   Endocrine:  Denies polyuria or polydipsia   Lymphatic:  Denies swollen glands   Psychiatric:  Denies depression or anxiety     Past Medical and Surgical History:   Past Medical History:   Diagnosis Date    Abdominal pain     MARANDA (acute kidney injury) ([facility] ) 6/19/2018    Cardiac disease     CHF (congestive heart failure) (HCC)     COPD, severe (HCC)     Coronary artery disease     DDD (degenerative disc disease), lumbar 9/1/2020    Diabetes mellitus ([place] [address] )     Dyspnea     GERD (gastroesophageal reflux disease)     Hyperlipidemia     Hypertension     MI (myocardial infarction) ([place] [address] )     with 3 stents    Nodule of apex of right lung     JACQUELINE (obstructive sleep apnea)     Prostate cancer [facility])      Past Surgical History:   Procedure Laterality Date    ABDOMINAL SURGERY      exploratory    ANGIOPLASTY      3 stents    APPENDECTOMY      COLONOSCOPY  2015    CT NEEDLE BIOPSY LUNG  11/3/2020    ESOPHAGOGASTRODUODENOSCOPY N/A 10/2/2017    Procedure: ESOPHAGOGASTRODUODENOSCOPY (EGD); Surgeon: Radha Ford MD;  Location:  GI LAB;   Service: Gastroenterology    IR THORACENTESIS  11/3/2020    KNEE CARTILAGE SURGERY      OTHER SURGICAL HISTORY      stent placement    PROSTATE SURGERY      SKIN GRAFT      Basal cell CA back       Meds/Allergies:    Current Facility-Administered Medications:     cyclobenzaprine (FLEXERIL) tablet 10 mg, 10 mg, Oral, TID, Laya Chign DO    [START ON 4/22/2022] enoxaparin (LOVENOX) subcutaneous injection 40 mg, 40 mg, Subcutaneous, Daily, Laya Ching DO    [START ON 4/22/2022] ferrous sulfate tablet 325 mg, 325 mg, Oral, Daily With Breakfast, Laya Ching DO    [START ON 4/22/2022] fluticasone-vilanterol (BREO ELLIPTA) 100-25 mcg/inh inhaler 1 puff, 1 puff, Inhalation, Daily, Laya Ching DO    furosemide (LASIX) injection 40 mg, 40 mg, Intravenous, Once, Laya Ching DO    [START ON 4/22/2022] furosemide (LASIX) tablet 40 mg, 40 mg, Oral, Daily, Machelle Arias DO    insulin lispro (HumaLOG) 100 units/mL subcutaneous injection 1-5 Units, 1-5 Units, Subcutaneous, HS, Machelle Arias DO    [START ON 4/22/2022] insulin lispro (HumaLOG) 100 units/mL subcutaneous injection 1-6 Units, 1-6 Units, Subcutaneous, TID AC **AND** [START ON 4/22/2022] Fingerstick Glucose (POCT), , , TID AC, Machelle Arias DO    [START ON 4/22/2022] insulin regular (HumuLIN R U-500) 500 units/mL CONCENTRATED injection 45 Units, 45 Units, Subcutaneous, BID AC, Machelle Arias DO    ipratropium (ATROVENT) 0 02 % inhalation solution 0 5 mg, 0 5 mg, Nebulization, TID, Machelle Arias DO    levalbuterol Kamila Flakita) inhalation solution 1 25 mg, 1 25 mg, Nebulization, TID **AND** sodium chloride 0 9 % inhalation solution 3 mL, 3 mL, Nebulization, TID, Machelle Arias DO    levalbuterol (XOPENEX) inhalation solution 1 25 mg, 1 25 mg, Nebulization, Q6H PRN **AND** sodium chloride 0 9 % inhalation solution 3 mL, 3 mL, Nebulization, Q6H PRN, Machelle Arias DO    metoprolol tartrate (LOPRESSOR) tablet 25 mg, 25 mg, Oral, BID, Machelle Arias DO    [START ON 4/22/2022] pravastatin (PRAVACHOL) tablet 80 mg, 80 mg, Oral, Daily With Dinner, Machelle Arias DO    [START ON 4/22/2022] predniSONE tablet 40 mg, 40 mg, Oral, Daily, Machelle Arias DO    Insert peripheral IV, , , Once **AND** sodium chloride (PF) 0 9 % injection 3 mL, 3 mL, Intravenous, Q1H PRN, Machelle Arias DO    Current Outpatient Medications:     BD Insulin Syringe U-500 31G X 6MM 0 5 ML MISC, USE 1 SYRINGE THREE TIMES A DAY FOR INJECTING INSULIN, Disp: 100 each, Rfl: 5    cyclobenzaprine (FLEXERIL) 10 mg tablet, Take 1 tablet (10 mg total) by mouth 3 (three) times a day for 10 days, Disp: 30 tablet, Rfl: 0    ferrous sulfate 325 (65 Fe) mg tablet, Take 1 tablet (325 mg total) by mouth daily with breakfast, Disp: 30 tablet, Rfl: 0    fluticasone-vilanterol (BREO ELLIPTA) 100-25 mcg/inh inhaler, Inhale 1 puff daily Rinse mouth after use , Disp: 60 blister, Rfl: 0    furosemide (LASIX) 40 mg tablet, Take 1 tablet (40 mg total) by mouth daily, Disp: 30 tablet, Rfl: 0    glucose blood (OneTouch Verio) test strip, May substitute brand based on insurance coverage  Check glucose QID , Disp: 100 each, Rfl: 0    HUMULIN R 500 UNIT/ML CONCENTRATED injection, INJECT 45 UNITS TWICE DAILY BEFORE BREAKFAST AND LUNCH, Disp: 40 mL, Rfl: 3    metoprolol tartrate (LOPRESSOR) 25 mg tablet, TAKE ONE TABLET BY MOUTH TWICE A DAY, Disp: 60 tablet, Rfl: 5    potassium chloride (K-DUR,KLOR-CON) 20 mEq tablet, Take 1 tablet (20 mEq total) by mouth daily, Disp: 60 tablet, Rfl: 0    simvastatin (ZOCOR) 40 mg tablet, TAKE ONE TABLET BY MOUTH EVERY DAY, Disp: 60 tablet, Rfl: 2    Trelegy Ellipta 100-62 5-25 MCG/INH inhaler, INHALE 1 PUFF BY MOUTH DAILY *RINSE MOUTH AFTER USE*, Disp: 60 each, Rfl: 3    Allergies: Allergies   Allergen Reactions    Crestor [Rosuvastatin] Other (See Comments)     Unknown      Metformin GI Intolerance     History:  Marital Status:       Substance Use History:   Social History     Substance and Sexual Activity   Alcohol Use Not Currently     Social History     Tobacco Use   Smoking Status Former Smoker    Packs/day: 1 50    Years: 50 00    Pack years: 75 00    Start date: 38    Quit date: 2020    Years since quittin 7   Smokeless Tobacco Never Used     Social History     Substance and Sexual Activity   Drug Use No       Family History:  Family History   Problem Relation Age of Onset    Heart disease Father     Other Father         Mesothelioma        Physical Exam:     Vitals:   Blood Pressure: 152/83 (22)  Pulse: 77 (22)  Temperature: 98 9 °F (37 2 °C) (22)  Temp Source: Oral (22)  Respirations: 22 (22)  Weight - Scale: 104 kg (230 lb) (22)  SpO2: 96 % (22)    Constitutional:  Well developed, obese, no acute distress, non-toxic appearance   Eyes:  PERRL, conjunctiva normal   HENT:  Atraumatic, external ears normal, nose normal, oropharynx moist, no pharyngeal exudates  Neck- normal range of motion, no tenderness, supple   Respiratory:  No respiratory distress, decreased bilateral breath sounds, no rales, no wheezing   Cardiovascular:  Normal rate, normal rhythm, no murmurs, no gallops, no rubs   GI:  Soft, nondistended, normal bowel sounds, nontender, no organomegaly, no mass, no rebound, no guarding   :  No costovertebral angle tenderness   Musculoskeletal:  No edema, no tenderness, no deformities  Back- no tenderness  Integument:  Well hydrated, no rash   Lymphatic:  No lymphadenopathy noted   Neurologic:  Alert &awake, communicative, CN 2-12 normal, normal motor function, normal sensory function, no focal deficits noted   Psychiatric:  Speech and behavior appropriate       Lab Results: I have personally reviewed pertinent reports  Results from last 7 days   Lab Units 04/21/22  1747   WBC Thousand/uL 8 95   HEMOGLOBIN g/dL 11 9*   HEMATOCRIT % 38 1   PLATELETS Thousands/uL 155   NEUTROS PCT % 79*   LYMPHS PCT % 12*   MONOS PCT % 7   EOS PCT % 1     Results from last 7 days   Lab Units 04/21/22  1747   POTASSIUM mmol/L 4 7   CHLORIDE mmol/L 103   CO2 mmol/L 30   BUN mg/dL 19   CREATININE mg/dL 1 43*   CALCIUM mg/dL 8 6           EKG:  Sinus rhythm with occasional PVCs HR 88, RBBB    Imaging: I have personally reviewed pertinent reports  and I have personally reviewed pertinent films in PACS     CXR:  Personally reviewed, chronic right lung scarring not significantly changed from prior  Final radiologist read is pending  CT head without contrast    Result Date: 3/29/2022  Narrative: CT BRAIN - WITHOUT CONTRAST INDICATION:   Gait instability, urinary incontinence  "This is a 78 y o  male presenting for evaluation of "tremors"  He has it baseline for years but for the last couple days it's gotten worse   He sometimes would have COPD exacerbation as a cause and it'd get better" COMPARISON:  None  TECHNIQUE:  CT examination of the brain was performed  In addition to axial images, sagittal and coronal 2D reformatted images were created and submitted for interpretation  Radiation dose length product (DLP) for this visit:  885 7 mGy-cm   This examination, like all CT scans performed in the [facility], was performed utilizing techniques to minimize radiation dose exposure, including the use of iterative reconstruction and automated exposure control  IMAGE QUALITY:  Diagnostic  FINDINGS: PARENCHYMA: Decreased attenuation is noted in periventricular and subcortical white matter demonstrating an appearance that is statistically most likely to represent mild microangiopathic change; this appearance is similar when compared to most recent prior examination  No CT signs of acute infarction  No intracranial mass, mass effect or midline shift  No acute parenchymal hemorrhage  VENTRICLES AND EXTRA-AXIAL SPACES:  Normal for the patient's age  VISUALIZED ORBITS AND PARANASAL SINUSES:  Unremarkable  CALVARIUM AND EXTRACRANIAL SOFT TISSUES:  Normal      Impression: No acute intracranial abnormality  Workstation performed: [id number]     PE Study with CT Abdomen and Pelvis with contrast    Result Date: 3/29/2022  Narrative: CT PULMONARY ANGIOGRAM OF THE CHEST AND CT ABDOMEN AND PELVIS WITH INTRAVENOUS CONTRAST INDICATION:   Nausea, abdominal tenderness, chest pain yesterday, tachycardic and tachypneic; r/o PE, intra-abdominal process  "Nausea, abdominal tenderness, chest pain yesterday, tachycardic and tachypneic;" COMPARISON:  CTA chest PE study 2/21/2022  CT chest abdomen pelvis 6/19/2018  TECHNIQUE:  CT examination of the chest, abdomen and pelvis was performed    Thin section CT angiographic technique was used in the chest in order to evaluate for pulmonary embolus and coronal 3D MIP postprocessing was performed on the acquisition scanner  Axial, sagittal, and coronal 2D reformatted images were created from the source data and submitted for interpretation  Radiation dose length product (DLP) for this visit:  1608 81 mGy-cm   This examination, like all CT scans performed in the [facility], was performed utilizing techniques to minimize radiation dose exposure, including the use of iterative reconstruction and automated exposure control  IV Contrast:  100 mL of iohexol (OMNIPAQUE) Enteric Contrast:  Enteric contrast was not administered  FINDINGS: CHEST PULMONARY ARTERIAL TREE:  No acute pulmonary arterial embolism  LUNGS:  Chronic right lung volume loss with linear scarring in the right upper lobe and lingula  unchanged right basilar round atelectasis  No new pulmonary infiltrate or consolidation  No suspicious focal nodules  Unchanged pulmonary emphysema  PLEURA:  Small chronic right pleural effusion  HEART/AORTA:  Heart is unremarkable for patient's age  No thoracic aortic aneurysm  Coronary artery calcifications  No pericardial effusion  MEDIASTINUM AND JEREMÍAS:  Mediastinum is shifted to the right secondary to the right chronic right lung volume loss  CHEST WALL AND LOWER NECK:   Unremarkable  ABDOMEN LIVER/BILIARY TREE:  Unremarkable  GALLBLADDER:  No calcified gallstones  No pericholecystic inflammatory change  SPLEEN:  Unremarkable  PANCREAS:  Unremarkable  ADRENAL GLANDS:  Unremarkable  KIDNEYS/URETERS:  Unremarkable  No hydronephrosis  STOMACH AND BOWEL:  No acute findings  Uncomplicated duodenal diverticulum  APPENDIX:  No findings to suggest appendicitis  ABDOMINOPELVIC CAVITY:  No ascites  No pneumoperitoneum  No lymphadenopathy  VESSELS:  Unremarkable for patient's age  PELVIS REPRODUCTIVE ORGANS:  Periprostatic radiation therapy seeds  URINARY BLADDER:  Unremarkable  ABDOMINAL WALL/INGUINAL REGIONS:  Small uncomplicated fat-containing umbilical and left inguinal hernias   OSSEOUS STRUCTURES: No acute fracture or destructive osseous lesion  Impression: No acute findings in the chest  Chronic unchanged right lung volume loss and right lower lobe round atelectasis with a chronic small right basilar pleural effusion  No pulmonary arterial embolism  No acute inflammatory changes in the abdomen or the pelvis  Workstation performed: [id number]         ** Please Note: Dragon 360 Dictation voice to text software was used in the creation of this document   **

## 2022-04-22 NOTE — ASSESSMENT & PLAN NOTE
· MIKKI was noted to be 2 on admission  · Patient also presented with anterior chest pain, which was noted by admitting provider to be slightly reproducible on palpation  · Currently patient denies CP  · Troponin x2 negative, EKG without acute ischemic change  · Consider outpatient stress testing

## 2022-04-22 NOTE — ASSESSMENT & PLAN NOTE
· Stage IA2 per chart review s/p SBRT to RUL 12/31/2020  · CT chest January 2022 without acute findings    Needs ongoing outpatient follow-up

## 2022-04-22 NOTE — ASSESSMENT & PLAN NOTE
Wt Readings from Last 3 Encounters:   04/21/22 104 kg (230 lb)   02/22/22 100 kg (220 lb 10 9 oz)   01/30/22 107 kg (236 lb 12 4 oz)     · Reports he is not compliant with twice daily dosing of Lasix, takes only once daily  Denies worsening edema or apparent weight gain but admits he does not weigh himself daily  · Trial dose of IV Lasix given some ongoing shortness of breath    Hopefully can transition back to oral tomorrow  · Monitor daily weights, I/O volume status

## 2022-04-22 NOTE — CASE MANAGEMENT
Case Management Assessment    Patient name Mika Guaman   Location [facility]32-66 MRN [id number]  : 1945 Date 2022       Current Admission Date: 2022  Current Admission Diagnosis:Shortness of breath   Patient Active Problem List    Diagnosis Date Noted    Shortness of breath 2022    Atypical chest pain 2022    CKD (chronic kidney disease) stage 3, GFR 30-59 ml/min ([facility] [address] ) 2022    History of prostate cancer 2022    UTI (urinary tract infection) 2022    [facility] lung [facility] ) 2022    Fracture of navicular bone of left foot 2021    Chronic respiratory failure with hypoxia ([facility] ) 04/15/2021    Obesity, morbid ([facility] ) 2021    PAD (peripheral artery disease) ([facility] ) 2021    History of lung cancer 2020    DDD (degenerative disc disease), lumbar 2020    Anemia, iron deficiency 2020    Lung nodule 2020    Pulmonary hypertension ([facility] ) 2019    JACQUELINE (obstructive sleep apnea) 2019    COPD with acute exacerbation ([facility] ) 2019    Oxygen dependent 2018    RBBB 2018    CAD (coronary artery disease) 2018    Chronic diastolic heart failure ([facility] ) 2018    Pleural effusion on right 10/31/2017    COPD, severe ([address] ) 2017    Diabetic neuropathy ([facility] ) 2017    Essential hypertension 2016    Venous stasis dermatitis of both lower extremities 11/15/2016    Type 2 diabetes mellitus with hyperglycemia, with long-term current use of insulin ([facility] ) 2016    COPD exacerbation ([facility] ) 2016    Obesity (BMI 30-39 9) 2016    HLD (hyperlipidemia) 2016      LOS (days): 0  Geometric Mean LOS (GMLOS) (days):   Days to GMLOS:     OBJECTIVE:        Current admission status: Observation       Preferred Pharmacy:   [address] [place] [address] [address] [address]  Phone: [phone] Fax: [phone]    Primary Care Provider: Amber Guzman MD    Primary Insurance: [facility]  Secondary Insurance:     ASSESSMENT:  [facility] Representative - Son   Primary Phone: [phone] (Mobile)                 Readmission Root Cause  30 Day Readmission: No    Patient Information  Admitted from[de-identified] Home  Mental Status: Alert  During Assessment patient was accompanied by: Not accompanied during assessment  Assessment information provided by[de-identified] Patient  Primary Caregiver: Self  Support Systems: Son  Type of Current Residence: 2 story home  Upon entering residence, is there a bedroom on the main floor (no further steps)?: No  A bedroom is located on the following floor levels of residence (select all that apply):: 2nd Floor  Upon entering residence, is there a bathroom on the main floor (no further steps)?: No  Indicate which floors of current residence have a bathroom (select all the apply):: 2nd Floor  Number of steps to 2nd floor from main floor: One Flight  Living Arrangements: Lives w/ Son  Is patient a ?: No    Activities of Daily Living Prior to Admission  Functional Status: Independent  Completes ADLs independently?: Yes  Ambulates independently?: Yes  Does patient use assisted devices?: No  Does patient currently own DME?: Yes  What DME does the patient currently own?: Home Oxygen concentrator,Portable Oxygen concentrator,Portable Oxygen tanks  Does patient have a history of Outpatient Therapy (PT/OT)?: No  Does the patient have a history of Short-Term Rehab?: No  Does patient have a history of HHC?: No  Does patient currently have [facility]?: No         Patient Information Continued  Income Source: Pension/custodial  Does patient have prescription coverage?: Yes  Does patient have a history of substance abuse?: No  Does patient have a history of Mental Health Diagnosis?: No         Means of Transportation  Means of Transport to [facility][de-identified] Drives Self

## 2022-04-22 NOTE — ASSESSMENT & PLAN NOTE
Lab Results   Component Value Date    EGFR 36 04/22/2022    EGFR 47 04/21/2022    EGFR 47 03/29/2022    CREATININE 1 78 (H) 04/22/2022    CREATININE 1 43 (H) 04/21/2022    CREATININE 1 44 (H) 03/29/2022   · Creatinine near recent baseline, monitor with BMP

## 2022-04-22 NOTE — ASSESSMENT & PLAN NOTE
Lab Results   Component Value Date    HGBA1C 8 8 (H) 02/21/2022       No results for input(s): POCGLU in the last 72 hours      Blood Sugar Average: Last 72 hrs:  ·  not controlled as per recent A1c  · For now continue insulin regular RU-500 45 units twice daily with correctional insulin/Accu-Cheks  · Carb controlled diet  · Initiate hypoglycemia protocol

## 2022-04-22 NOTE — PROGRESS NOTES
[facility]  Progress Note - Josefina Brumfield Sr  1945, 78 y o  male MRN: [id number]  Unit/Bed#: [facility] 528-01 Encounter: [id number]  Primary Care Provider: Abdiaziz Bangura MD   Date and time admitted to hospital: 4/21/2022  4:56 PM    * Shortness of breath  Assessment & Plan  · Patient presented with increased shortness of breath and slight increase in cough not responding to home nebulizers  · Unclear etiology as patient reports improvement/resolution of symptoms nebs and IV steroids along with IV Lasix   · Plan as per below     Atypical chest pain  Assessment & Plan  · MIKKI was noted to be 2 on admission  · Patient also presented with anterior chest pain, which was noted by admitting provider to be slightly reproducible on palpation  · Currently patient denies CP  · Troponin x2 negative, EKG without acute ischemic change  · Consider outpatient stress testing    Type 2 diabetes mellitus with hyperglycemia, with long-term current use of insulin [facility])  Assessment & Plan  Lab Results   Component Value Date    HGBA1C 8 8 (H) 02/21/2022       Recent Labs     04/22/22  0610 04/22/22  0813 04/22/22  1129   POCGLU >500* 495* 345*       Blood Sugar Average: Last 72 hrs:  · (P) 420   · Uncontrolled   Update HbA1c  · Endocrinology consulted  · Suspect element of steroid-induced hyperglycemia    COPD, severe ([address] )  Assessment & Plan  · Possible mild exacerbation as patient note improvement with nebulizers and IV Solu-Medrol, however patient also received IV Lasix   · Will continue with nebulizers  · Anticipate sort course of oral prednisone  · Continue Breo  · Respiratory protocol, incentive spirometry, pulmonary toileting  · Continue supplemental oxygen, titrate as needed to maintain SaO2 at least 88%    Chronic diastolic heart failure (HCC)  Assessment & Plan  Wt Readings from Last 3 Encounters:   04/22/22 100 kg (220 lb 14 4 oz)   02/22/22 100 kg (220 lb 10 9 oz)   01/30/22 107 kg (236 lb 12 4 oz)     · Reported on admission that he is not compliant with twice daily dosing of Lasix, takes only once daily  Denied worsening edema or apparent weight gain but admitted he does not weigh himself daily  · S/p dose of IV Lasix  · SOB and CP have resolved  · Continue PO Lasix   · Monitor daily weights, I/O volume status        Chronic respiratory failure with hypoxia (HCC)  Assessment & Plan  · On 2-3 L NC continuously in setting of prior lung cancer s/p lung surgery, COPD, CHF  · Continue supplemental oxygen, titrate as needed to maintain SaO2 greater than 88%  · On baseline oxygen requirements     CKD (chronic kidney disease) stage 3, GFR 30-59 ml/min [facility])  Assessment & Plan  Lab Results   Component Value Date    EGFR 36 04/22/2022    EGFR 47 04/21/2022    EGFR 47 03/29/2022    CREATININE 1 78 (H) 04/22/2022    CREATININE 1 43 (H) 04/21/2022    CREATININE 1 44 (H) 03/29/2022   · Creatinine near recent baseline, monitor with BMP    History of lung cancer  Assessment & Plan  · Stage IA2 per chart review s/p SBRT to [facility] 12/31/2020  · CT chest January 2022 without acute findings  Needs ongoing outpatient follow-up        VTE Pharmacologic Prophylaxis: VTE Score: 5 High Risk (Score >/= 5) - Pharmacological DVT Prophylaxis Ordered: enoxaparin (Lovenox)  Sequential Compression Devices Ordered  Patient Centered Rounds: I performed bedside rounds with nursing staff today  Fredy Triplett   Discussions with Specialists or Other Care Team Provider:      Education and Discussions with Family / Patient: Patient declined call to   Time Spent for Care: 20 minutes  More than 50% of total time spent on counseling and coordination of care as described above      Current Length of Stay: 0 day(s)  Current Patient Status: Observation   Certification Statement: The patient, admitted on an observation basis, will now require > 2 midnight hospital stay due to hyperglycemia and need to monitor symptoms Discharge Plan: Anticipate discharge tomorrow to home  Code Status: Level 1 - Full Code    Subjective:   Mr Phoebe Boone reports his CP and SOB have resolved  He attributes his improvement to IV steroids but he also received nebs and IV Lasix  Objective:     Vitals:   Temp (24hrs), Av 1 °F (36 7 °C), Min:97 5 °F (36 4 °C), Max:98 9 °F (37 2 °C)    Temp:  [97 5 °F (36 4 °C)-98 9 °F (37 2 °C)] 98 4 °F (36 9 °C)  HR:  [] 74  Resp:  [18-22] 18  BP: (113-183)/(52-87) 115/52  SpO2:  [96 %-100 %] 97 %  Body mass index is 29 96 kg/m²  Input and Output Summary (last 24 hours): Intake/Output Summary (Last 24 hours) at 2022 1335  Last data filed at 2022 1130  Gross per 24 hour   Intake 1780 ml   Output 1950 ml   Net -170 ml       Physical Exam:   Physical Exam  Vitals and nursing note reviewed  Constitutional:       Comments: Patient seen lying in bed, NAD   Cardiovascular:      Rate and Rhythm: Normal rate and regular rhythm  Pulmonary:      Effort: Pulmonary effort is normal  No respiratory distress  Breath sounds: Normal breath sounds  Comments: 3L NC O2  Abdominal:      General: Bowel sounds are normal       Palpations: Abdomen is soft  Tenderness: There is no abdominal tenderness  Musculoskeletal:      Right lower leg: No edema  Left lower leg: No edema  Skin:     General: Skin is warm  Neurological:      Mental Status: He is alert and oriented to person, place, and time     Psychiatric:         Mood and Affect: Mood normal          Behavior: Behavior normal           Additional Data:     Labs:  Results from last 7 days   Lab Units 22  0357 22  1747 22  1747   WBC Thousand/uL 7 78   < > 8 95   HEMOGLOBIN g/dL 12 7   < > 11 9*   HEMATOCRIT % 40 8   < > 38 1   PLATELETS Thousands/uL 156   < > 155   NEUTROS PCT %  --   --  79*   LYMPHS PCT %  --   --  12*   MONOS PCT %  --   --  7   EOS PCT %  --   --  1    < > = values in this interval not displayed       Results from last 7 days   Lab Units 04/22/22  0357   SODIUM mmol/L 128*   POTASSIUM mmol/L 4 8   CHLORIDE mmol/L 94*   CO2 mmol/L 26   BUN mg/dL 22   CREATININE mg/dL 1 78*   ANION GAP mmol/L 8   CALCIUM mg/dL 9 0   GLUCOSE RANDOM mg/dL 617*         Results from last 7 days   Lab Units 04/22/22  1129 04/22/22  0813 04/22/22  0610   POC GLUCOSE mg/dl 345* 495* >500*         Results from last 7 days   Lab Units 04/21/22  2223   PROCALCITONIN ng/ml 0 10       Lines/Drains:  Invasive Devices  Report    Peripheral Intravenous Line            Peripheral IV 04/21/22 Right Forearm <1 day                      Imaging: Reviewed radiology reports from this admission including: chest xray    Recent Cultures (last 7 days):         Last 24 Hours Medication List:   Current Facility-Administered Medications   Medication Dose Route Frequency Provider Last Rate    albuterol  2 5 mg Nebulization Q4H PRN Lemon Hopping, DO      cyclobenzaprine  10 mg Oral TID Lemon Hopping, DO      enoxaparin  40 mg Subcutaneous Daily Lemon Hopping, DO      ferrous sulfate  325 mg Oral Daily With Breakfast Lemon Hopping, DO      fluticasone-vilanterol  1 puff Inhalation Daily Lemon Hopping, DO      furosemide  40 mg Oral Daily Lemon Hopping, DO      insulin lispro  1-5 Units Subcutaneous HS Lemon Hopping, DO      insulin lispro  1-6 Units Subcutaneous TID AC Lemon Hopping, DO      insulin regular  45 Units Subcutaneous BID AC Lemon Hopping, DO      ipratropium  0 5 mg Nebulization TID Lemon Hopping, DO      levalbuterol  1 25 mg Nebulization TID Lemon Hopping, DO      metoprolol tartrate  25 mg Oral BID Lemon Hopping, DO      pravastatin  80 mg Oral Daily With [employer] Kiran, DO      predniSONE  40 mg Oral Daily Lemon Hopping, DO      sodium chloride (PF)  3 mL Intravenous Q1H PRN Lemon Hopping, DO          Today, Patient Was Seen By: Daryn Camp PA-C    **Please Note: This note may have been constructed using a voice recognition system  **

## 2022-04-22 NOTE — ED NOTES
Pt yelling at nurse "I asked for another pillow 40 minutes ago and you didn't get me it, get me a new nurse"  Pt already had a pillow on bed  Blankets and water provided        Tad Blake RN  04/22/22 1131

## 2022-04-22 NOTE — ASSESSMENT & PLAN NOTE
Wt Readings from Last 3 Encounters:   04/22/22 100 kg (220 lb 14 4 oz)   02/22/22 100 kg (220 lb 10 9 oz)   01/30/22 107 kg (236 lb 12 4 oz)     · Reported on admission that he is not compliant with twice daily dosing of Lasix, takes only once daily    Denied worsening edema or apparent weight gain but admitted he does not weigh himself daily  · S/p dose of IV Lasix  · SOB and CP have resolved  · Continue PO Lasix   · Monitor daily weights, I/O volume status

## 2022-04-22 NOTE — ASSESSMENT & PLAN NOTE
· Patient presented with increased shortness of breath and slight increase in cough not responding to home nebulizers     · Unclear etiology as patient reports improvement/resolution of symptoms nebs and IV steroids along with IV Lasix   · Plan as per below

## 2022-04-23 LAB
ANION GAP SERPL CALCULATED.3IONS-SCNC: 2 MMOL/L (ref 4–13)
BUN SERPL-MCNC: 28 MG/DL (ref 5–25)
CALCIUM SERPL-MCNC: 9.3 MG/DL (ref 8.3–10.1)
CHLORIDE SERPL-SCNC: 104 MMOL/L (ref 100–108)
CO2 SERPL-SCNC: 36 MMOL/L (ref 21–32)
CREAT SERPL-MCNC: 1.32 MG/DL (ref 0.6–1.3)
GFR SERPL CREATININE-BSD FRML MDRD: 52 ML/MIN/1.73SQ M
GLUCOSE SERPL-MCNC: 122 MG/DL (ref 65–140)
GLUCOSE SERPL-MCNC: 141 MG/DL (ref 65–140)
GLUCOSE SERPL-MCNC: 300 MG/DL (ref 65–140)
GLUCOSE SERPL-MCNC: 303 MG/DL (ref 65–140)
GLUCOSE SERPL-MCNC: 42 MG/DL (ref 65–140)
GLUCOSE SERPL-MCNC: 49 MG/DL (ref 65–140)
GLUCOSE SERPL-MCNC: 74 MG/DL (ref 65–140)
POTASSIUM SERPL-SCNC: 4.3 MMOL/L (ref 3.5–5.3)
SODIUM SERPL-SCNC: 142 MMOL/L (ref 136–145)

## 2022-04-23 PROCEDURE — 99232 SBSQ HOSP IP/OBS MODERATE 35: CPT | Performed by: PHYSICIAN ASSISTANT

## 2022-04-23 PROCEDURE — 80048 BASIC METABOLIC PNL TOTAL CA: CPT | Performed by: PHYSICIAN ASSISTANT

## 2022-04-23 PROCEDURE — 99232 SBSQ HOSP IP/OBS MODERATE 35: CPT | Performed by: INTERNAL MEDICINE

## 2022-04-23 PROCEDURE — 82948 REAGENT STRIP/BLOOD GLUCOSE: CPT

## 2022-04-23 PROCEDURE — 94760 N-INVAS EAR/PLS OXIMETRY 1: CPT

## 2022-04-23 PROCEDURE — 94640 AIRWAY INHALATION TREATMENT: CPT

## 2022-04-23 RX ORDER — LANOLIN ALCOHOL/MO/W.PET/CERES
3 CREAM (GRAM) TOPICAL
Status: DISCONTINUED | OUTPATIENT
Start: 2022-04-23 | End: 2022-04-24 | Stop reason: HOSPADM

## 2022-04-23 RX ADMIN — LEVALBUTEROL HYDROCHLORIDE 1.25 MG: 1.25 SOLUTION RESPIRATORY (INHALATION) at 13:01

## 2022-04-23 RX ADMIN — CYCLOBENZAPRINE HYDROCHLORIDE 10 MG: 10 TABLET, FILM COATED ORAL at 08:28

## 2022-04-23 RX ADMIN — INSULIN HUMAN 35 UNITS: 500 INJECTION, SOLUTION SUBCUTANEOUS at 17:36

## 2022-04-23 RX ADMIN — LEVALBUTEROL HYDROCHLORIDE 1.25 MG: 1.25 SOLUTION RESPIRATORY (INHALATION) at 07:22

## 2022-04-23 RX ADMIN — Medication 325 MG: at 08:28

## 2022-04-23 RX ADMIN — FLUTICASONE FUROATE AND VILANTEROL TRIFENATATE 1 PUFF: 100; 25 POWDER RESPIRATORY (INHALATION) at 08:46

## 2022-04-23 RX ADMIN — IPRATROPIUM BROMIDE 0.5 MG: 0.5 SOLUTION RESPIRATORY (INHALATION) at 07:22

## 2022-04-23 RX ADMIN — PRAVASTATIN SODIUM 80 MG: 80 TABLET ORAL at 17:34

## 2022-04-23 RX ADMIN — CYCLOBENZAPRINE HYDROCHLORIDE 10 MG: 10 TABLET, FILM COATED ORAL at 20:57

## 2022-04-23 RX ADMIN — PREDNISONE 40 MG: 20 TABLET ORAL at 08:30

## 2022-04-23 RX ADMIN — LEVALBUTEROL HYDROCHLORIDE 1.25 MG: 1.25 SOLUTION RESPIRATORY (INHALATION) at 19:44

## 2022-04-23 RX ADMIN — IPRATROPIUM BROMIDE 0.5 MG: 0.5 SOLUTION RESPIRATORY (INHALATION) at 13:01

## 2022-04-23 RX ADMIN — INSULIN LISPRO 8 UNITS: 100 INJECTION, SOLUTION INTRAVENOUS; SUBCUTANEOUS at 12:02

## 2022-04-23 RX ADMIN — IPRATROPIUM BROMIDE 0.5 MG: 0.5 SOLUTION RESPIRATORY (INHALATION) at 19:44

## 2022-04-23 RX ADMIN — ENOXAPARIN SODIUM 40 MG: 40 INJECTION SUBCUTANEOUS at 08:28

## 2022-04-23 RX ADMIN — INSULIN LISPRO 8 UNITS: 100 INJECTION, SOLUTION INTRAVENOUS; SUBCUTANEOUS at 17:36

## 2022-04-23 RX ADMIN — CYCLOBENZAPRINE HYDROCHLORIDE 10 MG: 10 TABLET, FILM COATED ORAL at 17:34

## 2022-04-23 RX ADMIN — INSULIN HUMAN 45 UNITS: 500 INJECTION, SOLUTION SUBCUTANEOUS at 12:02

## 2022-04-23 RX ADMIN — Medication 3 MG: at 21:36

## 2022-04-23 RX ADMIN — METOPROLOL TARTRATE 25 MG: 25 TABLET, FILM COATED ORAL at 17:34

## 2022-04-23 NOTE — PROGRESS NOTES
Progress Note - Claus Hidalgo Sr  78 y o  male MRN: [id number]    Unit/Bed#: [facility] 528-01 Encounter: [id number]    CC: diabetes f/u    Subjective:   Juan M Joyce  is a 78y o  year old male with type 2 diabetes who is admitted for COPD exacerbation and is being treated with steroids  He reports symptoms of hypoglycemia this morning when blood sugar was low  Breakfast mealtime insulin was held  He reports eating lighter dinner than he eats at home  Objective:     Vitals: Blood pressure 136/57, pulse 75, temperature 98 1 °F (36 7 °C), resp  rate 18, height 6' (1 829 m), weight 98 9 kg (218 lb 0 6 oz), SpO2 97 %  ,Body mass index is 29 57 kg/m²  Intake/Output Summary (Last 24 hours) at 4/23/2022 1559  Last data filed at 4/23/2022 1340  Gross per 24 hour   Intake 1640 ml   Output 1550 ml   Net 90 ml       Physical Exam:  General Appearance: awake, appears stated age and cooperative  Head: Normocephalic, without obvious abnormality, atraumatic  Extremities: moves all extremities  Skin: Skin color and temperature normal    Pulm: no labored breathing    Lab, Imaging and other studies: I have personally reviewed pertinent reports  POC Glucose (mg/dl)   Date Value   04/23/2022 300 (H)   04/23/2022 122   04/23/2022 74   04/23/2022 42 (L)   04/22/2022 371 (H)   04/22/2022 237 (H)   04/22/2022 345 (H)   04/22/2022 495 (H)   04/22/2022 >500 (HH)   02/23/2022 306 (H)       Assessment and plan:  Type 2 DM  Home regimen U 500 regular insulin 45 units before breakfast 45 units before lunch and 35 units before dinner  A1c 8 8  Pt was ordered 55 units u-500 TID but did not receive it due to hypoglycemia this AM  Decrease regimen to home regimen: u-500 45 units with breakfast and with lunch and 35 units with dinner  Continue to monitor blood glucoses and make adjustments as needed over time  COPD  treatment per primary team       Portions of the record may have been created with voice recognition software

## 2022-04-23 NOTE — ASSESSMENT & PLAN NOTE
Lab Results   Component Value Date    HGBA1C 9 2 (H) 04/22/2022       Recent Labs     04/22/22 2018 04/23/22  0623 04/23/22  0649 04/23/22  0714   POCGLU 371* 42* 74 122       Blood Sugar Average: Last 72 hrs:  · (P) 240 [id number]   · Uncontrolled as evidenced by A1c  · Endocrinology consult appreciated, insulin regimen per their recommendations  · Patient with significant hypoglycemia this AM  · Patient reports home regimen is as follows: U500 45 units - 45 units - 35 units

## 2022-04-23 NOTE — ASSESSMENT & PLAN NOTE
· MIKKI was noted to be 2 on admission  · Patient also presented with anterior chest pain, which was noted by admitting provider to be slightly reproducible on palpation  · CP has resolved and not recurred  · Troponin x2 negative, EKG without acute ischemic change  · Consider outpatient stress testing

## 2022-04-23 NOTE — ASSESSMENT & PLAN NOTE
Wt Readings from Last 3 Encounters:   04/23/22 98 9 kg (218 lb 0 6 oz)   02/22/22 100 kg (220 lb 10 9 oz)   01/30/22 107 kg (236 lb 12 4 oz)     · Reported on admission that he is not compliant with twice daily dosing of Lasix, takes only once daily    Denied worsening edema or apparent weight gain but admitted he does not weigh himself daily  · S/p dose of IV Lasix  · SOB and CP have resolved  · Continue PO Lasix   · Monitor daily weights, I/O volume status

## 2022-04-23 NOTE — ASSESSMENT & PLAN NOTE
· Patient presented with increased shortness of breath and slight increase in cough not responding to home nebulizers     · Unclear etiology as patient reports resolution of symptoms s/p nebs and IV steroids along with IV Lasix   · Plan as per below

## 2022-04-23 NOTE — PROGRESS NOTES
[address] [facility]  Progress Note - Claus Hidalgo Sr  1945, 78 y o  male MRN: [id number]  Unit/Bed#: [facility] 528-01 Encounter: [id number]  Primary Care Provider: Cally Peck MD   Date and time admitted to hospital: 4/21/2022  4:56 PM    * Shortness of breath  Assessment & Plan  · Patient presented with increased shortness of breath and slight increase in cough not responding to home nebulizers     · Unclear etiology as patient reports resolution of symptoms s/p nebs and IV steroids along with IV Lasix   · Plan as per below     Atypical chest pain  Assessment & Plan  · MIKKI was noted to be 2 on admission  · Patient also presented with anterior chest pain, which was noted by admitting provider to be slightly reproducible on palpation  · CP has resolved and not recurred  · Troponin x2 negative, EKG without acute ischemic change  · Consider outpatient stress testing    Type 2 diabetes mellitus with hyperglycemia, with long-term current use of insulin [facility])  Assessment & Plan  Lab Results   Component Value Date    HGBA1C 9 2 (H) 04/22/2022       Recent Labs     04/22/22 2018 04/23/22  0623 04/23/22  0649 04/23/22  0714   POCGLU 371* 42* 74 122       Blood Sugar Average: Last 72 hrs:  · (P) 240 [id number]   · Uncontrolled as evidenced by A1c  · Endocrinology consult appreciated, insulin regimen per their recommendations  · Patient with significant hypoglycemia this AM  · Patient reports home regimen is as follows: U500 45 units - 45 units - 35 units     COPD, severe ([address] )  Assessment & Plan  · Possible mild exacerbation as patient note improvement with nebulizers and IV Solu-Medrol, however patient also received IV Lasix   · Will continue with nebulizers  · Anticipate sort course of oral prednisone  · Continue Breo  · Respiratory protocol, incentive spirometry, pulmonary toileting  · Continue supplemental oxygen, titrate as needed to maintain SaO2 at least 88%    Chronic diastolic heart failure (HCC)  Assessment & Plan  Wt Readings from Last 3 Encounters:   04/23/22 98 9 kg (218 lb 0 6 oz)   02/22/22 100 kg (220 lb 10 9 oz)   01/30/22 107 kg (236 lb 12 4 oz)     · Reported on admission that he is not compliant with twice daily dosing of Lasix, takes only once daily  Denied worsening edema or apparent weight gain but admitted he does not weigh himself daily  · S/p dose of IV Lasix  · SOB and CP have resolved  · Continue PO Lasix   · Monitor daily weights, I/O volume status        Chronic respiratory failure with hypoxia (HCC)  Assessment & Plan  · On 2-3 L NC continuously in setting of prior lung cancer s/p lung surgery, COPD, CHF  · Continue supplemental oxygen, titrate as needed to maintain SaO2 greater than 88%  · On baseline oxygen requirements     CKD (chronic kidney disease) stage 3, GFR 30-59 ml/min [facility])  Assessment & Plan  Lab Results   Component Value Date    EGFR 52 04/23/2022    EGFR 36 04/22/2022    EGFR 47 04/21/2022    CREATININE 1 32 (H) 04/23/2022    CREATININE 1 78 (H) 04/22/2022    CREATININE 1 43 (H) 04/21/2022   · Creatinine near recent baseline, monitor with BMP    History of lung cancer  Assessment & Plan  · Stage IA2 per chart review s/p SBRT to [facility] 12/31/2020  · CT chest January 2022 without acute findings  Needs ongoing outpatient follow-up        VTE Pharmacologic Prophylaxis: VTE Score: 5 High Risk (Score >/= 5) - Pharmacological DVT Prophylaxis Ordered: enoxaparin (Lovenox)  Sequential Compression Devices Ordered  Patient Centered Rounds: I performed bedside rounds with nursing staff today  Discussions with Specialists or Other Care Team Provider:      Education and Discussions with Family / Patient: Patient declined call to   Time Spent for Care: 20 minutes  More than 50% of total time spent on counseling and coordination of care as described above      Current Length of Stay: 1 day(s)  Current Patient Status: Inpatient Certification Statement: The patient will continue to require additional inpatient hospital stay due to hyper and hypoglycemia  Discharge Plan: Anticipate discharge tomorrow to home  Code Status: Level 1 - Full Code    Subjective:   Mr Gennaro Lawson reports feeling dizzy this AM with low blood sugar  He reports that his CP and SOB resolved and has not recurred     Objective:     Vitals:   Temp (24hrs), Av 3 °F (36 8 °C), Min:98 °F (36 7 °C), Max:98 6 °F (37 °C)    Temp:  [98 °F (36 7 °C)-98 6 °F (37 °C)] 98 6 °F (37 °C)  HR:  [74-86] 75  Resp:  [17-18] 18  BP: ()/(52-59) 100/56  SpO2:  [97 %-98 %] 98 %  Body mass index is 29 57 kg/m²  Input and Output Summary (last 24 hours): Intake/Output Summary (Last 24 hours) at 2022 1135  Last data filed at 2022 0900  Gross per 24 hour   Intake 2000 ml   Output 1600 ml   Net 400 ml       Physical Exam:   Physical Exam  Vitals and nursing note reviewed  Constitutional:       Comments: Patient seen lying in bed comfortably resting, NAD, RN present   Cardiovascular:      Rate and Rhythm: Normal rate and regular rhythm  Pulmonary:      Effort: Pulmonary effort is normal  No respiratory distress  Breath sounds: Normal breath sounds  No wheezing  Comments: 2 5L NC O2, satting 99%  Abdominal:      General: Bowel sounds are normal       Palpations: Abdomen is soft  Tenderness: There is no abdominal tenderness  Musculoskeletal:      Right lower leg: No edema  Left lower leg: No edema  Skin:     General: Skin is warm  Neurological:      Mental Status: He is alert and oriented to person, place, and time     Psychiatric:         Mood and Affect: Mood normal          Behavior: Behavior normal           Additional Data:     Labs:  Results from last 7 days   Lab Units 22  0357 22  1747 22  1747   WBC Thousand/uL 7 78   < > 8 95   HEMOGLOBIN g/dL 12 7   < > 11 9*   HEMATOCRIT % 40 8   < > 38 1   PLATELETS Thousands/uL 156   < > 155   NEUTROS PCT %  --   --  79*   LYMPHS PCT %  --   --  12*   MONOS PCT %  --   --  7   EOS PCT %  --   --  1    < > = values in this interval not displayed  Results from last 7 days   Lab Units 04/23/22  0633   SODIUM mmol/L 142   POTASSIUM mmol/L 4 3   CHLORIDE mmol/L 104   CO2 mmol/L 36*   BUN mg/dL 28*   CREATININE mg/dL 1 32*   ANION GAP mmol/L 2*   CALCIUM mg/dL 9 3   GLUCOSE RANDOM mg/dL 49*         Results from last 7 days   Lab Units 04/23/22  0714 04/23/22  0649 04/23/22  0623 04/22/22 2018 04/22/22  1628 04/22/22  1129 04/22/22  0813 04/22/22  0610   POC GLUCOSE mg/dl 122 74 42* 371* 237* 345* 495* >500*     Results from last 7 days   Lab Units 04/22/22  0357   HEMOGLOBIN A1C % 9 2*     Results from last 7 days   Lab Units 04/21/22  2223   PROCALCITONIN ng/ml 0 10       Lines/Drains:  Invasive Devices  Report    Peripheral Intravenous Line            Peripheral IV 04/21/22 Right Forearm 1 day                      Imaging: No pertinent imaging reviewed      Recent Cultures (last 7 days):         Last 24 Hours Medication List:   Current Facility-Administered Medications   Medication Dose Route Frequency Provider Last Rate    albuterol  2 5 mg Nebulization Q4H PRN Leann Shoemaker DO      cyclobenzaprine  10 mg Oral TID Leann Shoemaker DO      enoxaparin  40 mg Subcutaneous Daily Leann Shoemaker DO      ferrous sulfate  325 mg Oral Daily With Breakfast Leann Shoemaker DO      fluticasone-vilanterol  1 puff Inhalation Daily Leann Shoemaker DO      furosemide  40 mg Oral Daily Leann Shoemaker DO      insulin lispro  1-6 Units Subcutaneous HS Valente Peters MD      insulin lispro  2-12 Units Subcutaneous TID AC Valente Peters MD      insulin regular  35 Units Subcutaneous Daily With [employer], DO      insulin regular  45 Units Subcutaneous Daily With Lunch Juana Cramer DO      [START ON 4/24/2022] insulin regular  45 Units Subcutaneous Daily With Breakfast Juana Cramer, DO      ipratropium  0 5 mg Nebulization TID [facility], DO      levalbuterol  1 25 mg Nebulization TID [facility], DO      metoprolol tartrate  25 mg Oral BID [facility], DO      pravastatin  80 mg Oral Daily With [employer] Kiran, DO      predniSONE  40 mg Oral Daily [facility], DO      sodium chloride (PF)  3 mL Intravenous Q1H PRN Amalia [place], DO          Today, Patient Was Seen By: Ale Petersen PA-C    **Please Note: This note may have been constructed using a voice recognition system  **

## 2022-04-23 NOTE — ASSESSMENT & PLAN NOTE
Lab Results   Component Value Date    EGFR 52 04/23/2022    EGFR 36 04/22/2022    EGFR 47 04/21/2022    CREATININE 1 32 (H) 04/23/2022    CREATININE 1 78 (H) 04/22/2022    CREATININE 1 43 (H) 04/21/2022   · Creatinine near recent baseline, monitor with BMP

## 2022-04-24 VITALS
DIASTOLIC BLOOD PRESSURE: 67 MMHG | WEIGHT: 219.8 LBS | HEIGHT: 72 IN | HEART RATE: 65 BPM | BODY MASS INDEX: 29.77 KG/M2 | SYSTOLIC BLOOD PRESSURE: 135 MMHG | TEMPERATURE: 97.3 F | OXYGEN SATURATION: 96 % | RESPIRATION RATE: 19 BRPM

## 2022-04-24 LAB
GLUCOSE SERPL-MCNC: 135 MG/DL (ref 65–140)
GLUCOSE SERPL-MCNC: 230 MG/DL (ref 65–140)

## 2022-04-24 PROCEDURE — 82948 REAGENT STRIP/BLOOD GLUCOSE: CPT

## 2022-04-24 PROCEDURE — 94664 DEMO&/EVAL PT USE INHALER: CPT

## 2022-04-24 PROCEDURE — 94640 AIRWAY INHALATION TREATMENT: CPT

## 2022-04-24 PROCEDURE — 94760 N-INVAS EAR/PLS OXIMETRY 1: CPT

## 2022-04-24 PROCEDURE — 99239 HOSP IP/OBS DSCHRG MGMT >30: CPT | Performed by: PHYSICIAN ASSISTANT

## 2022-04-24 RX ADMIN — LEVALBUTEROL HYDROCHLORIDE 1.25 MG: 1.25 SOLUTION RESPIRATORY (INHALATION) at 07:26

## 2022-04-24 RX ADMIN — FUROSEMIDE 40 MG: 40 TABLET ORAL at 08:56

## 2022-04-24 RX ADMIN — PREDNISONE 40 MG: 20 TABLET ORAL at 09:04

## 2022-04-24 RX ADMIN — Medication 325 MG: at 08:56

## 2022-04-24 RX ADMIN — INSULIN HUMAN 45 UNITS: 500 INJECTION, SOLUTION SUBCUTANEOUS at 11:52

## 2022-04-24 RX ADMIN — INSULIN LISPRO 4 UNITS: 100 INJECTION, SOLUTION INTRAVENOUS; SUBCUTANEOUS at 11:52

## 2022-04-24 RX ADMIN — ENOXAPARIN SODIUM 40 MG: 40 INJECTION SUBCUTANEOUS at 08:56

## 2022-04-24 RX ADMIN — IPRATROPIUM BROMIDE 0.5 MG: 0.5 SOLUTION RESPIRATORY (INHALATION) at 07:26

## 2022-04-24 RX ADMIN — CYCLOBENZAPRINE HYDROCHLORIDE 10 MG: 10 TABLET, FILM COATED ORAL at 08:56

## 2022-04-24 RX ADMIN — METOPROLOL TARTRATE 25 MG: 25 TABLET, FILM COATED ORAL at 08:56

## 2022-04-24 RX ADMIN — FLUTICASONE FUROATE AND VILANTEROL TRIFENATATE 1 PUFF: 100; 25 POWDER RESPIRATORY (INHALATION) at 09:00

## 2022-04-24 NOTE — DISCHARGE INSTRUCTIONS
Chest Pain, Ambulatory Care   GENERAL INFORMATION:   Chest pain  can be caused by a range of conditions, from not serious to life-threatening  It may be caused by a heart attack or a blood clot in your lungs  Sometimes chest pain or pressure is caused by poor blood flow to your heart (angina)  Infection, inflammation, or a fracture in the bones or cartilage in your chest can cause pain or discomfort  Chest pain can also be a symptom of a digestive problem, such as acid reflux or a stomach ulcer  Common symptoms include the following:   · Fever or sweating     · Nausea or vomiting     · Shortness of breath     · Discomfort or pressure that spreads from your chest to your back, jaw, or arm     · A racing or slow heartbeat     · Feeling weak, tired, or faint  Seek immediate care for the following symptoms:   · Any of the following signs of a heart attack:      ¨ Squeezing, pressure, or pain in your chest that lasts longer than 5 minutes or returns    ¨ Discomfort or pain in your back, neck, jaw, stomach, or arm     ¨ Trouble breathing     ¨ Nausea or vomiting    ¨ Lightheadedness or a sudden cold sweat, especially with trouble breathing         · Chest discomfort that gets worse, even with medicine    · Coughing or vomiting blood    · Black or bloody bowel movements     · Vomiting that does not stop, or pain when you swallow  Treatment for chest pain  may include medicine to treat your symptoms while he determines the cause of your chest pain  You may also need any of the following:  · Antiplatelets , such as aspirin, help prevent blood clots  Take your antiplatelet medicine exactly as directed  These medicines make it more likely for you to bleed or bruise  If you are told to take aspirin, do not take acetaminophen or ibuprofen instead  · Prescription pain medicine  may be given  Ask how to take this medicine safely  Do not smoke: If you smoke, it is never too late to quit   Smoking increases your risk for a heart attack and other heart and lung conditions  Ask your healthcare provider for information about how to stop smoking if you need help  Follow up with your healthcare provider as directed: You may need more tests  You may be referred to a specialist, such as a cardiologist or gastroenterologist  Write down your questions so you remember to ask them during your visits  CARE AGREEMENT:   You have the right to help plan your care  Learn about your health condition and how it may be treated  Discuss treatment options with your caregivers to decide what care you want to receive  You always have the right to refuse treatment  The above information is an  only  It is not intended as medical advice for individual conditions or treatments  Talk to your doctor, nurse or pharmacist before following any medical regimen to see if it is safe and effective for you  © 2014 [address] is for End User's use only and may not be sold, redistributed or otherwise used for commercial purposes  All illustrations and images included in CareNotes® are the copyrighted property of [employer]  or Onofre Carroll

## 2022-04-24 NOTE — DISCHARGE SUMMARY
Discharge Summary - [address] [facility]    Patient Information: Zi Coburn  78 y o  male MRN: [id number]  [facility]counter: [id number]    Discharging Physician / Practitioner: Luana Murray PA-C  PCP: Phil Mooney MD  Admission Date: 4/21/2022  Discharge Date: 04/24/22    Reason for Admission: SOB, CP, hyperglycemia     Discharge Diagnoses:     Principal Problem:    Shortness of breath  Active Problems:    Atypical chest pain    Type 2 diabetes mellitus with hyperglycemia, with long-term current use of insulin (HCC)    COPD, severe (HCC)    Chronic diastolic heart failure (HCC)    Chronic respiratory failure with hypoxia (HCC)    CKD (chronic kidney disease) stage 3, GFR 30-59 ml/min (HCC)    History of lung cancer  Resolved Problems:    * No resolved hospital problems  *      Consultations During Hospital Stay:  · Endocrinology     Procedures Performed:   · CXR  · COVID-19, influenza A/B, RSV  · CBC  · BMP  · Troponin  · NT-PRO BNP  · HbA1c  · Procalcitonin    Significant Findings:   · CXR: "Persistent chronic right lung volume loss and pleural effusion"  · COVID-19, influenza A/B, RSV: negative  · High sensitivity troponin 0 hour: 15  · High sensitivity troponin 2 hour: 14  · NT-PRO BNP: 951  · HbA1c: 9 2  · Procalcitonin: 0 10    Incidental Findings:   · None     Test Results Pending at Discharge (will require follow up): · None     Outpatient Tests Requested:  · Follow up with PCP  · Follow up with Endocrinology     Complications:  None    Hospital Course:     Zi Coburn  is a 78 y o  male with uncontrolled IDDM2 on U500, COPD, CHF, chronic oxygen use (2-3L baseline), CKD, and history of lung cancer who originally presented to the hospital on 4/21/2022 due to CP and SOB  Troponins were noted to be negative as above  BNP mildly elevated as above  CXR with persistent chronic right lung volume loss and pleural effusion    Symptoms resolved s/p nebulizers, IV steroid x1, and IV Lasix  He was placed on home p o  Diuretic regimen and 40 mg p o  Prednisone x 3 days  Endocrinology was consulted given hyperglycemia  I discussed with Endocrinology today who cleared the patient from their standpoint for discharge today on his home insulin regimen  Recommend outpatient follow-up with Endocrinology  ALso discussed with [facility] attending, Dr Natacha Rollins, on day of discharge  Symptoms resolved and have not recurred and patient on baseline oxygen requirements  Condition at Discharge: stable     Discharge Day Visit / Exam:     Subjective:    Mr Mervat Rodriguez reports CP and SOB have resolved and not recurred  He reports that when he came in his chest was TTP when provider palpated  Vitals: Blood Pressure: 135/67 (04/24/22 0622)  Pulse: 65 (04/24/22 0856)  Temperature: (!) 97 3 °F (36 3 °C) (04/24/22 0622)  Temp Source: Oral (04/22/22 2223)  Respirations: 19 (04/23/22 2252)  Height: 6' (182 9 cm) (04/22/22 0347)  Weight - Scale: 99 7 kg (219 lb 12 8 oz) (04/24/22 0520)  SpO2: 96 % (04/24/22 0900)  Exam:   Physical Exam  Vitals and nursing note reviewed  Constitutional:       Comments: Patient seen sitting in bed comfortably resting, NAD   Cardiovascular:      Rate and Rhythm: Normal rate and regular rhythm  Pulmonary:      Effort: Pulmonary effort is normal       Breath sounds: Normal breath sounds  Comments: 2L NC O2  Abdominal:      General: Bowel sounds are normal       Palpations: Abdomen is soft  Tenderness: There is no abdominal tenderness  Musculoskeletal:      Right lower leg: No edema  Left lower leg: No edema  Skin:     General: Skin is warm  Neurological:      Mental Status: He is alert and oriented to person, place, and time  Psychiatric:         Mood and Affect: Mood normal          Behavior: Behavior normal          Discharge instructions/Information to patient and family:   See after visit summary for information provided to patient and family        Provisions for Follow-Up Care:  See after visit summary for information related to follow-up care and any pertinent home health orders  Disposition:     Home    For Discharges to [facility] SNF:   · Not Applicable to this Patient - Not Applicable to this Patient    Planned Readmission: None     Discharge Statement:  I spent 35 minutes discharging the patient  This time was spent on the day of discharge  I had direct contact with the patient on the day of discharge  Greater than 50% of the total time was spent examining patient, answering all patient questions, arranging and discussing plan of care with patient as well as directly providing post-discharge instructions  Additional time then spent on discharge activities  Discharge Medications:  See after visit summary for reconciled discharge medications provided to patient and family  ** Please Note: Dragon 360 Dictation voice to text software may have been used in the creation of this document   **

## 2022-04-24 NOTE — PLAN OF CARE
Problem: Potential for Falls  Goal: Patient will remain free of falls  Description: INTERVENTIONS:  - Educate patient/family on patient safety including physical limitations  - Instruct patient to call for assistance with activity   - Consult OT/PT to assist with strengthening/mobility   - Keep Call bell within reach  - Keep bed low and locked with side rails adjusted as appropriate  - Keep care items and personal belongings within reach  - Initiate and maintain comfort rounds  - Make Fall Risk Sign visible to staff  - Offer Toileting every  Hours, in advance of need  - Initiate/Maintain alarm  - Obtain necessary fall risk management equipment:   - Apply yellow socks and bracelet for high fall risk patients  - Consider moving patient to room near nurses station  Outcome: Progressing     Problem: MOBILITY - ADULT  Goal: Maintain or return to baseline ADL function  Description: INTERVENTIONS:  -  Assess patient's ability to carry out ADLs; assess patient's baseline for ADL function and identify physical deficits which impact ability to perform ADLs (bathing, care of mouth/teeth, toileting, grooming, dressing, etc )  - Assess/evaluate cause of self-care deficits   - Assess range of motion  - Assess patient's mobility; develop plan if impaired  - Assess patient's need for assistive devices and provide as appropriate  - Encourage maximum independence but intervene and supervise when necessary  - Involve family in performance of ADLs  - Assess for home care needs following discharge   - Consider OT consult to assist with ADL evaluation and planning for discharge  - Provide patient education as appropriate  Outcome: Progressing  Goal: Maintains/Returns to pre admission functional level  Description: INTERVENTIONS:  - Perform BMAT or MOVE assessment daily    - Set and communicate daily mobility goal to care team and patient/family/caregiver     - Collaborate with rehabilitation services on mobility goals if consulted  - Perform Range of Motion  times a day  - Reposition patient every  hours    - Dangle patient  times a day  - Stand patient  times a day  - Ambulate patient  times a day  - Out of bed to chair  times a day   - Out of bed for meals times a day  - Out of bed for toileting  - Record patient progress and toleration of activity level   Outcome: Progressing     Problem: CARDIOVASCULAR - ADULT  Goal: Maintains optimal cardiac output and hemodynamic stability  Description: INTERVENTIONS:  - Monitor I/O, vital signs and rhythm  - Monitor for S/S and trends of decreased cardiac output  - Administer and titrate ordered vasoactive medications to optimize hemodynamic stability  - Assess quality of pulses, skin color and temperature  - Assess for signs of decreased coronary artery perfusion  - Instruct patient to report change in severity of symptoms  Outcome: Progressing  Goal: Absence of cardiac dysrhythmias or at baseline rhythm  Description: INTERVENTIONS:  - Continuous cardiac monitoring, vital signs, obtain 12 lead EKG if ordered  - Administer antiarrhythmic and heart rate control medications as ordered  - Monitor electrolytes and administer replacement therapy as ordered  Outcome: Progressing     Problem: RESPIRATORY - ADULT  Goal: Achieves optimal ventilation and oxygenation  Description: INTERVENTIONS:  - Assess for changes in respiratory status  - Assess for changes in mentation and behavior  - Position to facilitate oxygenation and minimize respiratory effort  - Oxygen administered by appropriate delivery if ordered  - Initiate smoking cessation education as indicated  - Encourage broncho-pulmonary hygiene including cough, deep breathe, Incentive Spirometry  - Assess the need for suctioning and aspirate as needed  - Assess and instruct to report SOB or any respiratory difficulty  - Respiratory Therapy support as indicated  Outcome: Progressing     Problem: METABOLIC, FLUID AND ELECTROLYTES - ADULT  Goal: Electrolytes maintained within normal limits  Description: INTERVENTIONS:  - Monitor labs and assess patient for signs and symptoms of electrolyte imbalances  - Administer electrolyte replacement as ordered  - Monitor response to electrolyte replacements, including repeat lab results as appropriate  - Instruct patient on fluid and nutrition as appropriate  Outcome: Progressing  Goal: Fluid balance maintained  Description: INTERVENTIONS:  - Monitor labs   - Monitor I/O and WT  - Instruct patient on fluid and nutrition as appropriate  - Assess for signs & symptoms of volume excess or deficit  Outcome: Progressing  Goal: Glucose maintained within target range  Description: INTERVENTIONS:  - Monitor Blood Glucose as ordered  - Assess for signs and symptoms of hyperglycemia and hypoglycemia  - Administer ordered medications to maintain glucose within target range  - Assess nutritional intake and initiate nutrition service referral as needed  Outcome: Progressing

## 2022-04-25 NOTE — UTILIZATION REVIEW
Notification of Discharge   This is a Notification of Discharge from our facility [address]  Please be advised that this patient has been discharge from our facility  Below you will find the admission and discharge date and time including the patients disposition  UTILIZATION REVIEW CONTACT:  [facility]  Utilization   Network Utilization Review Department  Phone: [phone] x carefully listen to the prompts  All voicemails are confidential   Email: [email or web address]     PHYSICIAN ADVISORY SERVICES:  FOR VQAP-QK-LASL REVIEW - MEDICAL NECESSITY DENIAL  Phone: [phone]  Fax: [phone]  Email: [email or web address]     PRESENTATION DATE: 4/21/2022  4:56 PM  OBERVATION ADMISSION DATE:   INPATIENT ADMISSION DATE: 4/22/22  4:11 PM   DISCHARGE DATE: 4/24/2022  6:49 PM  DISPOSITION: Home/Self Care Home/Self Care      IMPORTANT INFORMATION:  Send all requests for admission clinical reviews, approved or denied determinations and any other requests to dedicated fax number below belonging to the campus where the patient is receiving treatment   List of dedicated fax numbers:  [address] DENIALS (Administrative/Medical Necessity) [phone]   1000  [facility] (Maternity/NICU/Pediatrics) [phone]   [facility] [phone]   130 [facility] [phone]   [address] [place] [address] [phone]   2000 [facility] [address] [place] [address],[address] [place] [address] [phone]   [facility]  [phone]   [address] [place] [address], [address]  2401 [facility] 1000 [facility] [phone]

## 2022-04-26 ENCOUNTER — TRANSITIONAL CARE MANAGEMENT (OUTPATIENT)
Dept: FAMILY MEDICINE CLINIC | Facility: CLINIC | Age: 77
End: 2022-04-26

## 2022-05-01 ENCOUNTER — APPOINTMENT (EMERGENCY)
Dept: RADIOLOGY | Facility: HOSPITAL | Age: 77
DRG: 189 | End: 2022-05-01
Payer: COMMERCIAL

## 2022-05-01 ENCOUNTER — HOSPITAL ENCOUNTER (INPATIENT)
Facility: HOSPITAL | Age: 77
LOS: 1 days | Discharge: HOME HEALTH | DRG: 189 | End: 2022-05-03
Attending: EMERGENCY MEDICINE | Admitting: INTERNAL MEDICINE
Payer: COMMERCIAL

## 2022-05-01 DIAGNOSIS — J44.9 COPD, SEVERE (HCC): ICD-10-CM

## 2022-05-01 DIAGNOSIS — R06.00 DYSPNEA: Primary | ICD-10-CM

## 2022-05-01 DIAGNOSIS — R06.02 SHORTNESS OF BREATH: ICD-10-CM

## 2022-05-01 DIAGNOSIS — R07.9 CHEST PAIN: ICD-10-CM

## 2022-05-01 DIAGNOSIS — J96.11 CHRONIC RESPIRATORY FAILURE WITH HYPOXIA (HCC): ICD-10-CM

## 2022-05-01 DIAGNOSIS — J18.9 PNEUMONIA: ICD-10-CM

## 2022-05-01 DIAGNOSIS — J44.1 COPD EXACERBATION (HCC): ICD-10-CM

## 2022-05-01 DIAGNOSIS — J44.1 COPD WITH ACUTE EXACERBATION (HCC): ICD-10-CM

## 2022-05-01 DIAGNOSIS — I50.9 CHF (CONGESTIVE HEART FAILURE) (HCC): ICD-10-CM

## 2022-05-01 DIAGNOSIS — J44.9 CHRONIC OBSTRUCTIVE PULMONARY DISEASE, UNSPECIFIED COPD TYPE (HCC): ICD-10-CM

## 2022-05-01 LAB
ANION GAP SERPL CALCULATED.3IONS-SCNC: 0 MMOL/L (ref 4–13)
BASOPHILS # BLD AUTO: 0.01 THOUSANDS/ΜL (ref 0–0.1)
BASOPHILS NFR BLD AUTO: 0 % (ref 0–1)
BUN SERPL-MCNC: 22 MG/DL (ref 5–25)
CALCIUM SERPL-MCNC: 8.5 MG/DL (ref 8.3–10.1)
CARDIAC TROPONIN I PNL SERPL HS: 21 NG/L
CHLORIDE SERPL-SCNC: 105 MMOL/L (ref 100–108)
CO2 SERPL-SCNC: 35 MMOL/L (ref 21–32)
CREAT SERPL-MCNC: 1.12 MG/DL (ref 0.6–1.3)
EOSINOPHIL # BLD AUTO: 0.03 THOUSAND/ΜL (ref 0–0.61)
EOSINOPHIL NFR BLD AUTO: 0 % (ref 0–6)
ERYTHROCYTE [DISTWIDTH] IN BLOOD BY AUTOMATED COUNT: 15.9 % (ref 11.6–15.1)
GFR SERPL CREATININE-BSD FRML MDRD: 64 ML/MIN/1.73SQ M
GLUCOSE SERPL-MCNC: 226 MG/DL (ref 65–140)
HCT VFR BLD AUTO: 38.8 % (ref 36.5–49.3)
HGB BLD-MCNC: 11.9 G/DL (ref 12–17)
IMM GRANULOCYTES # BLD AUTO: 0.05 THOUSAND/UL (ref 0–0.2)
IMM GRANULOCYTES NFR BLD AUTO: 1 % (ref 0–2)
LYMPHOCYTES # BLD AUTO: 0.98 THOUSANDS/ΜL (ref 0.6–4.47)
LYMPHOCYTES NFR BLD AUTO: 14 % (ref 14–44)
MCH RBC QN AUTO: 25.6 PG (ref 26.8–34.3)
MCHC RBC AUTO-ENTMCNC: 30.7 G/DL (ref 31.4–37.4)
MCV RBC AUTO: 83 FL (ref 82–98)
MONOCYTES # BLD AUTO: 0.52 THOUSAND/ΜL (ref 0.17–1.22)
MONOCYTES NFR BLD AUTO: 7 % (ref 4–12)
NEUTROPHILS # BLD AUTO: 5.46 THOUSANDS/ΜL (ref 1.85–7.62)
NEUTS SEG NFR BLD AUTO: 78 % (ref 43–75)
NRBC BLD AUTO-RTO: 0 /100 WBCS
NT-PROBNP SERPL-MCNC: 1322 PG/ML
PLATELET # BLD AUTO: 140 THOUSANDS/UL (ref 149–390)
PMV BLD AUTO: 11 FL (ref 8.9–12.7)
POTASSIUM SERPL-SCNC: 4.3 MMOL/L (ref 3.5–5.3)
RBC # BLD AUTO: 4.65 MILLION/UL (ref 3.88–5.62)
SODIUM SERPL-SCNC: 140 MMOL/L (ref 136–145)
WBC # BLD AUTO: 7.05 THOUSAND/UL (ref 4.31–10.16)

## 2022-05-01 PROCEDURE — 93005 ELECTROCARDIOGRAM TRACING: CPT

## 2022-05-01 PROCEDURE — 85025 COMPLETE CBC W/AUTO DIFF WBC: CPT | Performed by: EMERGENCY MEDICINE

## 2022-05-01 PROCEDURE — 96374 THER/PROPH/DIAG INJ IV PUSH: CPT

## 2022-05-01 PROCEDURE — 99285 EMERGENCY DEPT VISIT HI MDM: CPT | Performed by: EMERGENCY MEDICINE

## 2022-05-01 PROCEDURE — 36415 COLL VENOUS BLD VENIPUNCTURE: CPT | Performed by: EMERGENCY MEDICINE

## 2022-05-01 PROCEDURE — 84484 ASSAY OF TROPONIN QUANT: CPT | Performed by: EMERGENCY MEDICINE

## 2022-05-01 PROCEDURE — 94760 N-INVAS EAR/PLS OXIMETRY 1: CPT

## 2022-05-01 PROCEDURE — 71046 X-RAY EXAM CHEST 2 VIEWS: CPT

## 2022-05-01 PROCEDURE — 83880 ASSAY OF NATRIURETIC PEPTIDE: CPT | Performed by: EMERGENCY MEDICINE

## 2022-05-01 PROCEDURE — 94644 CONT INHLJ TX 1ST HOUR: CPT

## 2022-05-01 PROCEDURE — 94664 DEMO&/EVAL PT USE INHALER: CPT

## 2022-05-01 PROCEDURE — 99285 EMERGENCY DEPT VISIT HI MDM: CPT

## 2022-05-01 PROCEDURE — 80048 BASIC METABOLIC PNL TOTAL CA: CPT | Performed by: EMERGENCY MEDICINE

## 2022-05-01 RX ORDER — FUROSEMIDE 10 MG/ML
20 INJECTION INTRAMUSCULAR; INTRAVENOUS ONCE
Status: COMPLETED | OUTPATIENT
Start: 2022-05-01 | End: 2022-05-01

## 2022-05-01 RX ORDER — SODIUM CHLORIDE FOR INHALATION 0.9 %
3 VIAL, NEBULIZER (ML) INHALATION ONCE
Status: COMPLETED | OUTPATIENT
Start: 2022-05-01 | End: 2022-05-01

## 2022-05-01 RX ADMIN — FUROSEMIDE 20 MG: 10 INJECTION, SOLUTION INTRAMUSCULAR; INTRAVENOUS at 23:16

## 2022-05-01 RX ADMIN — PREDNISONE 50 MG: 20 TABLET ORAL at 21:16

## 2022-05-01 RX ADMIN — ISODIUM CHLORIDE 3 ML: 0.03 SOLUTION RESPIRATORY (INHALATION) at 21:21

## 2022-05-01 RX ADMIN — ALBUTEROL SULFATE 10 MG: 2.5 SOLUTION RESPIRATORY (INHALATION) at 21:21

## 2022-05-01 RX ADMIN — IPRATROPIUM BROMIDE 1 MG: 0.5 SOLUTION RESPIRATORY (INHALATION) at 21:21

## 2022-05-02 ENCOUNTER — APPOINTMENT (OUTPATIENT)
Dept: RADIOLOGY | Facility: HOSPITAL | Age: 77
DRG: 189 | End: 2022-05-02
Payer: COMMERCIAL

## 2022-05-02 LAB
2HR DELTA HS TROPONIN: 3 NG/L
4HR DELTA HS TROPONIN: -1 NG/L
ALBUMIN SERPL BCP-MCNC: 3 G/DL (ref 3.5–5)
ALP SERPL-CCNC: 90 U/L (ref 46–116)
ALT SERPL W P-5'-P-CCNC: 22 U/L (ref 12–78)
ANION GAP SERPL CALCULATED.3IONS-SCNC: 4 MMOL/L (ref 4–13)
APTT PPP: 26 SECONDS (ref 23–37)
AST SERPL W P-5'-P-CCNC: 16 U/L (ref 5–45)
BASOPHILS # BLD MANUAL: 0 THOUSAND/UL (ref 0–0.1)
BASOPHILS NFR MAR MANUAL: 0 % (ref 0–1)
BILIRUB SERPL-MCNC: 0.21 MG/DL (ref 0.2–1)
BUN SERPL-MCNC: 20 MG/DL (ref 5–25)
BURR CELLS BLD QL SMEAR: PRESENT
CALCIUM ALBUM COR SERPL-MCNC: 9.3 MG/DL (ref 8.3–10.1)
CALCIUM SERPL-MCNC: 8.5 MG/DL (ref 8.3–10.1)
CARDIAC TROPONIN I PNL SERPL HS: 20 NG/L
CARDIAC TROPONIN I PNL SERPL HS: 24 NG/L
CHLORIDE SERPL-SCNC: 101 MMOL/L (ref 100–108)
CO2 SERPL-SCNC: 31 MMOL/L (ref 21–32)
CREAT SERPL-MCNC: 1.21 MG/DL (ref 0.6–1.3)
DACRYOCYTES BLD QL SMEAR: PRESENT
EOSINOPHIL # BLD MANUAL: 0 THOUSAND/UL (ref 0–0.4)
EOSINOPHIL NFR BLD MANUAL: 0 % (ref 0–6)
ERYTHROCYTE [DISTWIDTH] IN BLOOD BY AUTOMATED COUNT: 15.9 % (ref 11.6–15.1)
GFR SERPL CREATININE-BSD FRML MDRD: 58 ML/MIN/1.73SQ M
GLUCOSE SERPL-MCNC: 178 MG/DL (ref 65–140)
GLUCOSE SERPL-MCNC: 194 MG/DL (ref 65–140)
GLUCOSE SERPL-MCNC: 197 MG/DL (ref 65–140)
GLUCOSE SERPL-MCNC: 286 MG/DL (ref 65–140)
GLUCOSE SERPL-MCNC: 363 MG/DL (ref 65–140)
GLUCOSE SERPL-MCNC: 376 MG/DL (ref 65–140)
HCT VFR BLD AUTO: 40.9 % (ref 36.5–49.3)
HELMET CELLS BLD QL SMEAR: PRESENT
HGB BLD-MCNC: 12.2 G/DL (ref 12–17)
INR PPP: 0.9 (ref 0.84–1.19)
L PNEUMO1 AG UR QL IA.RAPID: NEGATIVE
LYMPHOCYTES # BLD AUTO: 0 % (ref 14–44)
LYMPHOCYTES # BLD AUTO: 0 THOUSAND/UL (ref 0.6–4.47)
MACROCYTES BLD QL AUTO: PRESENT
MCH RBC QN AUTO: 25.1 PG (ref 26.8–34.3)
MCHC RBC AUTO-ENTMCNC: 29.8 G/DL (ref 31.4–37.4)
MCV RBC AUTO: 84 FL (ref 82–98)
MICROCYTES BLD QL AUTO: PRESENT
MONOCYTES # BLD AUTO: 0 THOUSAND/UL (ref 0–1.22)
MONOCYTES NFR BLD: 0 % (ref 4–12)
NEUTROPHILS # BLD MANUAL: 9.08 THOUSAND/UL (ref 1.85–7.62)
NEUTS BAND NFR BLD MANUAL: 4 % (ref 0–8)
NEUTS SEG NFR BLD AUTO: 94 % (ref 43–75)
OVALOCYTES BLD QL SMEAR: PRESENT
PLATELET # BLD AUTO: 140 THOUSANDS/UL (ref 149–390)
PLATELET BLD QL SMEAR: ABNORMAL
PMV BLD AUTO: 10.4 FL (ref 8.9–12.7)
POIKILOCYTOSIS BLD QL SMEAR: PRESENT
POLYCHROMASIA BLD QL SMEAR: PRESENT
POTASSIUM SERPL-SCNC: 4.4 MMOL/L (ref 3.5–5.3)
PROCALCITONIN SERPL-MCNC: 0.1 NG/ML
PROT SERPL-MCNC: 7.4 G/DL (ref 6.4–8.2)
PROTHROMBIN TIME: 11.8 SECONDS (ref 11.6–14.5)
RBC # BLD AUTO: 4.87 MILLION/UL (ref 3.88–5.62)
RBC MORPH BLD: PRESENT
S PNEUM AG UR QL: NEGATIVE
SCHISTOCYTES BLD QL SMEAR: PRESENT
SODIUM SERPL-SCNC: 136 MMOL/L (ref 136–145)
TSH SERPL DL<=0.05 MIU/L-ACNC: 0.54 UIU/ML (ref 0.45–4.5)
VARIANT LYMPHS # BLD AUTO: 2 %
WBC # BLD AUTO: 9.27 THOUSAND/UL (ref 4.31–10.16)

## 2022-05-02 PROCEDURE — 85027 COMPLETE CBC AUTOMATED: CPT | Performed by: INTERNAL MEDICINE

## 2022-05-02 PROCEDURE — 82948 REAGENT STRIP/BLOOD GLUCOSE: CPT

## 2022-05-02 PROCEDURE — 80053 COMPREHEN METABOLIC PANEL: CPT | Performed by: INTERNAL MEDICINE

## 2022-05-02 PROCEDURE — 85610 PROTHROMBIN TIME: CPT | Performed by: INTERNAL MEDICINE

## 2022-05-02 PROCEDURE — 71250 CT THORAX DX C-: CPT

## 2022-05-02 PROCEDURE — 84145 PROCALCITONIN (PCT): CPT | Performed by: INTERNAL MEDICINE

## 2022-05-02 PROCEDURE — 94640 AIRWAY INHALATION TREATMENT: CPT

## 2022-05-02 PROCEDURE — 87449 NOS EACH ORGANISM AG IA: CPT | Performed by: INTERNAL MEDICINE

## 2022-05-02 PROCEDURE — 84484 ASSAY OF TROPONIN QUANT: CPT | Performed by: EMERGENCY MEDICINE

## 2022-05-02 PROCEDURE — 94760 N-INVAS EAR/PLS OXIMETRY 1: CPT

## 2022-05-02 PROCEDURE — 97166 OT EVAL MOD COMPLEX 45 MIN: CPT

## 2022-05-02 PROCEDURE — 97162 PT EVAL MOD COMPLEX 30 MIN: CPT

## 2022-05-02 PROCEDURE — 87070 CULTURE OTHR SPECIMN AEROBIC: CPT | Performed by: INTERNAL MEDICINE

## 2022-05-02 PROCEDURE — 85007 BL SMEAR W/DIFF WBC COUNT: CPT | Performed by: INTERNAL MEDICINE

## 2022-05-02 PROCEDURE — 99223 1ST HOSP IP/OBS HIGH 75: CPT | Performed by: INTERNAL MEDICINE

## 2022-05-02 PROCEDURE — 87040 BLOOD CULTURE FOR BACTERIA: CPT | Performed by: INTERNAL MEDICINE

## 2022-05-02 PROCEDURE — 85730 THROMBOPLASTIN TIME PARTIAL: CPT | Performed by: INTERNAL MEDICINE

## 2022-05-02 PROCEDURE — G1004 CDSM NDSC: HCPCS

## 2022-05-02 PROCEDURE — 87205 SMEAR GRAM STAIN: CPT | Performed by: INTERNAL MEDICINE

## 2022-05-02 PROCEDURE — 84443 ASSAY THYROID STIM HORMONE: CPT | Performed by: INTERNAL MEDICINE

## 2022-05-02 PROCEDURE — 99220 PR INITIAL OBSERVATION CARE/DAY 70 MINUTES: CPT | Performed by: INTERNAL MEDICINE

## 2022-05-02 RX ORDER — ACETAMINOPHEN 325 MG/1
650 TABLET ORAL EVERY 8 HOURS PRN
Status: DISCONTINUED | OUTPATIENT
Start: 2022-05-02 | End: 2022-05-03 | Stop reason: HOSPADM

## 2022-05-02 RX ORDER — LEVALBUTEROL INHALATION SOLUTION 1.25 MG/3ML
1.25 SOLUTION RESPIRATORY (INHALATION)
Status: DISCONTINUED | OUTPATIENT
Start: 2022-05-02 | End: 2022-05-02

## 2022-05-02 RX ORDER — INSULIN LISPRO 100 [IU]/ML
1-5 INJECTION, SOLUTION INTRAVENOUS; SUBCUTANEOUS
Status: DISCONTINUED | OUTPATIENT
Start: 2022-05-02 | End: 2022-05-02

## 2022-05-02 RX ORDER — ATORVASTATIN CALCIUM 40 MG/1
40 TABLET, FILM COATED ORAL
Status: DISCONTINUED | OUTPATIENT
Start: 2022-05-02 | End: 2022-05-02

## 2022-05-02 RX ORDER — IPRATROPIUM BROMIDE AND ALBUTEROL SULFATE 2.5; .5 MG/3ML; MG/3ML
3 SOLUTION RESPIRATORY (INHALATION) EVERY 6 HOURS PRN
Status: DISCONTINUED | OUTPATIENT
Start: 2022-05-02 | End: 2022-05-03 | Stop reason: HOSPADM

## 2022-05-02 RX ORDER — INSULIN LISPRO 100 [IU]/ML
1-5 INJECTION, SOLUTION INTRAVENOUS; SUBCUTANEOUS
Status: DISCONTINUED | OUTPATIENT
Start: 2022-05-02 | End: 2022-05-03 | Stop reason: HOSPADM

## 2022-05-02 RX ORDER — FORMOTEROL FUMARATE 20 UG/2ML
20 SOLUTION RESPIRATORY (INHALATION)
Status: DISCONTINUED | OUTPATIENT
Start: 2022-05-02 | End: 2022-05-02

## 2022-05-02 RX ORDER — FUROSEMIDE 40 MG/1
40 TABLET ORAL DAILY
Status: DISCONTINUED | OUTPATIENT
Start: 2022-05-02 | End: 2022-05-03 | Stop reason: HOSPADM

## 2022-05-02 RX ORDER — FLUTICASONE FUROATE AND VILANTEROL 100; 25 UG/1; UG/1
1 POWDER RESPIRATORY (INHALATION) DAILY
Status: DISCONTINUED | OUTPATIENT
Start: 2022-05-02 | End: 2022-05-02

## 2022-05-02 RX ORDER — BUDESONIDE 0.5 MG/2ML
0.5 INHALANT ORAL
Status: DISCONTINUED | OUTPATIENT
Start: 2022-05-02 | End: 2022-05-02

## 2022-05-02 RX ORDER — ATORVASTATIN CALCIUM 20 MG/1
20 TABLET, FILM COATED ORAL
Status: DISCONTINUED | OUTPATIENT
Start: 2022-05-02 | End: 2022-05-03 | Stop reason: HOSPADM

## 2022-05-02 RX ORDER — FERROUS SULFATE 325(65) MG
325 TABLET ORAL
Status: DISCONTINUED | OUTPATIENT
Start: 2022-05-02 | End: 2022-05-03 | Stop reason: HOSPADM

## 2022-05-02 RX ORDER — FORMOTEROL FUMARATE 20 UG/2ML
20 SOLUTION RESPIRATORY (INHALATION)
Status: DISCONTINUED | OUTPATIENT
Start: 2022-05-02 | End: 2022-05-03

## 2022-05-02 RX ORDER — BUDESONIDE 0.5 MG/2ML
0.5 INHALANT ORAL
Status: DISCONTINUED | OUTPATIENT
Start: 2022-05-02 | End: 2022-05-03

## 2022-05-02 RX ORDER — LEVALBUTEROL INHALATION SOLUTION 1.25 MG/3ML
1.25 SOLUTION RESPIRATORY (INHALATION)
Status: DISCONTINUED | OUTPATIENT
Start: 2022-05-02 | End: 2022-05-03 | Stop reason: HOSPADM

## 2022-05-02 RX ORDER — HEPARIN SODIUM 5000 [USP'U]/ML
5000 INJECTION, SOLUTION INTRAVENOUS; SUBCUTANEOUS EVERY 8 HOURS SCHEDULED
Status: DISCONTINUED | OUTPATIENT
Start: 2022-05-02 | End: 2022-05-03 | Stop reason: HOSPADM

## 2022-05-02 RX ADMIN — AZITHROMYCIN MONOHYDRATE 500 MG: 500 INJECTION, POWDER, LYOPHILIZED, FOR SOLUTION INTRAVENOUS at 05:55

## 2022-05-02 RX ADMIN — METOPROLOL TARTRATE 25 MG: 25 TABLET, FILM COATED ORAL at 08:16

## 2022-05-02 RX ADMIN — HEPARIN SODIUM 5000 UNITS: 5000 INJECTION INTRAVENOUS; SUBCUTANEOUS at 21:09

## 2022-05-02 RX ADMIN — LEVALBUTEROL HYDROCHLORIDE 1.25 MG: 1.25 SOLUTION RESPIRATORY (INHALATION) at 19:53

## 2022-05-02 RX ADMIN — BUDESONIDE 0.5 MG: 0.5 INHALANT ORAL at 19:53

## 2022-05-02 RX ADMIN — INSULIN LISPRO 3 UNITS: 100 INJECTION, SOLUTION INTRAVENOUS; SUBCUTANEOUS at 11:04

## 2022-05-02 RX ADMIN — FORMOTEROL FUMARATE DIHYDRATE 20 MCG: 20 SOLUTION RESPIRATORY (INHALATION) at 19:53

## 2022-05-02 RX ADMIN — INSULIN HUMAN 45 UNITS: 500 INJECTION, SOLUTION SUBCUTANEOUS at 17:42

## 2022-05-02 RX ADMIN — CEFTRIAXONE SODIUM 1000 MG: 10 INJECTION, POWDER, FOR SOLUTION INTRAVENOUS at 05:04

## 2022-05-02 RX ADMIN — HEPARIN SODIUM 5000 UNITS: 5000 INJECTION INTRAVENOUS; SUBCUTANEOUS at 05:04

## 2022-05-02 RX ADMIN — FERROUS SULFATE TAB 325 MG (65 MG ELEMENTAL FE) 325 MG: 325 (65 FE) TAB at 07:43

## 2022-05-02 RX ADMIN — INSULIN LISPRO 1 UNITS: 100 INJECTION, SOLUTION INTRAVENOUS; SUBCUTANEOUS at 03:10

## 2022-05-02 RX ADMIN — INSULIN HUMAN 45 UNITS: 500 INJECTION, SOLUTION SUBCUTANEOUS at 06:27

## 2022-05-02 RX ADMIN — FUROSEMIDE 40 MG: 40 TABLET ORAL at 08:16

## 2022-05-02 RX ADMIN — LEVALBUTEROL HYDROCHLORIDE 1.25 MG: 1.25 SOLUTION RESPIRATORY (INHALATION) at 13:00

## 2022-05-02 RX ADMIN — INSULIN LISPRO 1 UNITS: 100 INJECTION, SOLUTION INTRAVENOUS; SUBCUTANEOUS at 21:09

## 2022-05-02 RX ADMIN — METOPROLOL TARTRATE 25 MG: 25 TABLET, FILM COATED ORAL at 21:09

## 2022-05-02 RX ADMIN — FLUTICASONE FUROATE AND VILANTEROL TRIFENATATE 1 PUFF: 100; 25 POWDER RESPIRATORY (INHALATION) at 08:16

## 2022-05-02 RX ADMIN — IPRATROPIUM BROMIDE 0.5 MG: 0.5 SOLUTION RESPIRATORY (INHALATION) at 19:53

## 2022-05-02 RX ADMIN — METOPROLOL TARTRATE 25 MG: 25 TABLET, FILM COATED ORAL at 03:10

## 2022-05-02 RX ADMIN — ATORVASTATIN CALCIUM 20 MG: 20 TABLET, FILM COATED ORAL at 17:42

## 2022-05-02 RX ADMIN — IPRATROPIUM BROMIDE 0.5 MG: 0.5 SOLUTION RESPIRATORY (INHALATION) at 13:00

## 2022-05-02 RX ADMIN — HEPARIN SODIUM 5000 UNITS: 5000 INJECTION INTRAVENOUS; SUBCUTANEOUS at 14:47

## 2022-05-02 RX ADMIN — LEVALBUTEROL HYDROCHLORIDE 1.25 MG: 1.25 SOLUTION RESPIRATORY (INHALATION) at 07:04

## 2022-05-02 RX ADMIN — INSULIN LISPRO 4 UNITS: 100 INJECTION, SOLUTION INTRAVENOUS; SUBCUTANEOUS at 06:27

## 2022-05-02 RX ADMIN — INSULIN LISPRO 1 UNITS: 100 INJECTION, SOLUTION INTRAVENOUS; SUBCUTANEOUS at 17:42

## 2022-05-02 NOTE — PHYSICAL THERAPY NOTE
Physical Therapy Evaluation    Patient's Name: Nicolasa Amato  Admitting Diagnosis  Shortness of breath [R06 02]  CHF (congestive heart failure) ([facility]) [I50 9]  Chest pain [R07 9]  Dyspnea [R06 00]  COPD with acute exacerbation ([facility]) [J44 1]    Problem List  Patient Active Problem List   Diagnosis    COPD exacerbation ([place] [address] )    Obesity (BMI 30-39  9)    HLD (hyperlipidemia)    Type 2 diabetes mellitus with hyperglycemia, with long-term current use of insulin (HCC)    Venous stasis dermatitis of both lower extremities    COPD, severe (HCC)    Pleural effusion on right    Chronic diastolic heart failure (HCC)    Essential hypertension    Diabetic neuropathy (HCC)    CAD (coronary artery disease)    RBBB    Oxygen dependent    COPD with acute exacerbation (HCC)    Pulmonary hypertension (HCC)    JACQUELINE (obstructive sleep apnea)    Lung nodule    DDD (degenerative disc disease), lumbar    Anemia, iron deficiency    History of lung cancer    Obesity, morbid ([place] [address] )    PAD (peripheral artery disease) ([facility])    Chronic respiratory failure with hypoxia ([facility])    Fracture of navicular bone of left foot    Adenocarcinoma of lung (HCC)    UTI (urinary tract infection)    History of prostate cancer    CKD (chronic kidney disease) stage 3, GFR 30-59 ml/min ([facility])    Atypical chest pain    Shortness of breath       Past Medical History  Past Medical History:   Diagnosis Date    Abdominal pain     MARANDA (acute kidney injury) ([place] [address] ) 6/19/2018    Cardiac disease     CHF (congestive heart failure) (HCC)     COPD, severe (HCC)     Coronary artery disease     DDD (degenerative disc disease), lumbar 9/1/2020    Diabetes mellitus ([place] [address] )     Dyspnea     GERD (gastroesophageal reflux disease)     Hyperlipidemia     Hypertension     MI (myocardial infarction) ([place] [address] )     with 3 stents    Nodule of apex of right lung     JACQUELINE (obstructive sleep apnea)     Prostate cancer [facility])        Past Surgical History  Past Surgical History:   Procedure Laterality Date    ABDOMINAL SURGERY      exploratory    ANGIOPLASTY      3 stents    APPENDECTOMY      COLONOSCOPY  2015    CT NEEDLE BIOPSY LUNG  11/3/2020    ESOPHAGOGASTRODUODENOSCOPY N/A 10/2/2017    Procedure: ESOPHAGOGASTRODUODENOSCOPY (EGD); Surgeon: Fay Whaley MD;  Location:  GI LAB; Service: Gastroenterology    IR THORACENTESIS  11/3/2020    KNEE CARTILAGE SURGERY      OTHER SURGICAL HISTORY      stent placement    PROSTATE SURGERY      SKIN GRAFT      Basal cell CA back        05/02/22 0824   PT Last Visit   PT Visit Date 05/02/22   Note Type   Note type Evaluation   Pain Assessment   Pain Assessment Tool 0-10   Pain Score No Pain   Restrictions/Precautions   Other Precautions Multiple lines;O2;Fall Risk;Telemetry   Home Living   Type of Home House   Home Layout Two level;Performs ADLs on one level  (0 VINI)   Bathroom Toilet Raised   Bathroom Equipment Grab bars in shower;Grab bars around toilet   Home Equipment Wheelchair-electric;Cane   Additional Comments Pt reports living in a [facility] with 0 VINI, has [address]   Reports ambulating with SPC prn, but typically gets around his house without any AD; uses electric scooter for community mobility   Prior Function   Level of [place] Independent with ADLs and functional mobility   Lives With Son   Receives Help From Family   ADL Assistance Independent   IADLs Independent   Comments Pt is on 2-3L O2 at baseline   Cognition   Overall Cognitive Status WFL   Orientation Level Oriented X4   Memory Decreased recall of precautions   Following Commands Follows one step commands without difficulty   RLE Assessment   RLE Assessment WFL   LLE Assessment   LLE Assessment WFL   Bed Mobility   Additional Comments pt seated EOB at start of session   Transfers   Sit to Stand 5  Supervision   Additional items Assist x 1   Stand to Sit 5  Supervision   Additional items Assist x 1   Additional Comments pt declining use of any AD for transfers   Ambulation/Elevation   Gait pattern Excessively slow; Foward flexed; Short stride   Gait Assistance 5  Supervision   Additional items Assist x 1   Assistive Device None   Distance ~20ft within room- pt declining any further ambulation   Ambulation/Elevation Additional Comments Pt denying ambulation trial with AD- demonstrated ability to navigate short distances with no AD at supervision level   Balance   Static Sitting Good   Dynamic Sitting Fair +   Static Standing Fair   Dynamic Standing Fair -   Ambulatory Fair -   Endurance Deficit   Endurance Deficit Yes   Endurance Deficit Description requires supplemental O2, limited activity tolerance   Activity Tolerance   Activity Tolerance Patient limited by fatigue   Medical Staff Made Aware Seen with OT 2* pt's medical complexity and multiple comorbidities  PT focus on functional transfers, gait, and endurance   Nurse Made Aware ok to see per RN   Assessment   Prognosis Good   Assessment Pt is a 78 y o  male seen for PT evaluation s/p admit [facility] 5/1/[facility]22  Pt was admitted with a primary dx of: shortness of breath  PT now consulted for assessment of mobility and d/c needs  Pt with Up as tolerated, PT evaluation orders  Pts current comorbidities effecting treatment include: MARANDA, cardiac disease, CHF, DM, lumbar DDD, HTN, hx of MI, prostate cancer  Pts current clinical presentation is Evolving (medium complexity) due to Ongoing medical management for primary dx, Decreased activity tolerance compared to baseline, Fall risk, Increased assistance needed from caregiver at current time, Trending lab values  Prior to admission, pt was reports being independent with ADLs and ambulating with SPC vs no AD household distances and using electric scooter for community navigation  Pt lives with his son in a [facility] with 0 VINI and [employer]   Upon evaluation, pt currently is requiring supervision for transfers and supervision for ambulation 20 ft w/ no AD (pt declining any further mobility)  Pt presents at PT eval functioning at/near baseline mobility level  No acute PT needs identified, PT signing off  Pt would benefit from continued ambulation with nursing and restorative staff as able  At conclusion of PT session seated EOB with phone and call bell within reach  Pt denies any further questions at this time  The patient's AM-PAC Basic Mobility Inpatient Short Form Raw Score is 17  A Raw score of greater than 16 suggests the patient may benefit from discharge to home  Please also refer to the recommendation of the Physical Therapist for safe discharge planning  Recommend home with HHPT upon hospital D/C  Goals   Patient Goals to rest   Plan   PT Frequency Other (Comment)  (1x visit, D/C PT)   Recommendation   PT Discharge Recommendation Home with home health rehabilitation   [facility] [address] Mobility Inpatient   Turning in Bed Without Bedrails 3   Lying on Back to Sitting on Edge of Flat Bed 3   Moving Bed to Chair 3   Standing Up From Chair 3   Walk in Room 3   Climb 3-5 Stairs 2   Basic Mobility Inpatient Raw Score 17   Basic Mobility Standardized Score 39 67   Highest Level Of Mobility   JH-HLM Goal 5: Stand one or more mins   JH-HLM Highest Level of Mobility 6: Walk 10 steps or more   JH-HLM Goal Achieved Yes   Modified [place] Scale   Modified [place] Scale 3   End of Consult   Patient Position at End of Consult Seated edge of bed; All needs within reach       Raymond Cruz, PT, DPT

## 2022-05-02 NOTE — H&P
[facility]  H&P- Tarik Oneill Sr  1945, 78 y o  male MRN: [id number]  Unit/Bed#: [facility] 380-20 Encounter: [id number]  Primary Care Provider: Raya Clark MD   Date and time admitted to hospital: 5/1/2022  8:42 PM    * Shortness of breath  Assessment & Plan  -SOB beginning earlier in the day as well as chest tightness   -No relief with breathing treatment at home  -Hx Chronic Respiratory Failure on baseline 2-3L NC in setting of prior Lung cancer surgery, COPD, CHF  -Hx Lung Cancer Stage IA2 per chart review s/p SBRT to RUL 12/31/2020  -On pta breo inhaler, lasix  -Recently hospitalized and d/c'd on 4/24 for similar symptoms; Treated with Oral steroids, home diuretics and nebulizers with reported improvement  -BNP elevated at 1322  -CXR with evidence or R sided effusion  -EKG: Sinus, rate 91, 1st degree AV block, RBBB, nonspecific st changes  Similar to previous EKG  -Troponin negative x 2  -Given Oral Prednisone, IV lasix and duonebs in the ER  -No wheezing on exam, decreased breath sounds on the R  -CT Chest without contrast in ER: Airspace opacities within the left upper lobe which may represent infection  (spoke with radiologist regarding this to clarify)  Recommend short-term follow-up chest CT scan in 3 months to evaluate for resolution  Plan:  >Admit to medicine on telemetry  Treat for Non-Severe CAP with Ceftriaxone and Azithromycin  Order blood cultures and sputum cx/gram stain  Will consult Pulmonology for their opinion given patient's numerous pulmonary issues     >Continue pta inhalers  >Continue pta lasix  >Daily labs  >Pulmonary hygiene      Chronic respiratory failure with hypoxia (HCC)  Assessment & Plan  -On baseline 2-3 at rest  -Hx Lung Cancer, COPD, CHF  Plan:  >As above in SOB section    COPD, severe ([address] )  Assessment & Plan  -On pta Breo and trelegy?  -Given prednisone in ER as well as duoneb without improvement  -Not wheezing on exam  Plan:  >Treat for pneumonia  Continue pta breo and prn duonebs  Consult Pulm    Chronic diastolic heart failure (HCC)  Assessment & Plan  Wt Readings from Last 3 Encounters:   05/02/22 98 2 kg (216 lb 9 6 oz)   04/24/22 99 7 kg (219 lb 12 8 oz)   02/22/22 100 kg (220 lb 10 9 oz)       -Weight 3lbs less than when patient was recently discharged  -Hx HFpEF  -BNP elevated at 1322  -On pta lasix   Plan:  >Continue pta lasix and toprol  Treating for pneumonia  Atypical chest pain  Assessment & Plan  -Chest heaviness  -Troponin negative, EKG without ischemic changes  -Previously seen several days ago at [facility] with similar chest pain and felt to not likely be cardiac in origin  -In setting of possible pneumonia  Plan:  >Treat for pneumonia as above in SOB section  If patient's pain continues despite treatment for pneumonia, consider cardiology consultation and repeat EKG/troponin  History of lung cancer  Assessment & Plan  -Hx Lung Cancer dx in 2020, stage IA2  Plan:  >Consult Pulm  Treatment as per SOB section above  VTE Prophylaxis: Heparin  / sequential compression device   Code Status: Level 1 - Full Code     Anticipated Length of Stay:  Patient will be admitted on an Observation basis with an anticipated length of stay of  < 2 midnights  Justification for Hospital Stay: Please see detailed plans noted above  Chief Complaint:     Shortness of breath    History of Present Illness:  Reema Lockwood Sr  is a 78 y o  male who has past medical history significant for Chronic hypoxic respiratory failure on baseline 2-3L at rest, COPD, CHF, Lung Cancer, T2D who presented to [facility] on the evening of 5/1/22 with symptoms of shortness of breath that began that morning  Patient reports that he had similar symptoms about 1 5 weeks ago for which he was hospitalized at [facility] for copd/chf exacerbation  Patient reports that he tried his nebulizer at home but this did not provide relief  Patient also reports fatigue   Patient also reports a productive cough  Patient denies any documented fevers  Patient also reports a chest "heaviness" that he reports he had at last admission  Patient reports that he felt better after leaving the hospital for several days but that his symptoms returned this morning  Patient denies any increased abdominal distension on exam and reports his abdomen is always this size  Patient was given nebulizer as well as steroids in the ER without significant improvement in symptoms  Patient reports that steroids and nebulizer usually helps his symptoms  Patient denies any increased lower extremity edema or calf pain         Review of Systems:    Constitutional:  Denies fever or chills   Eyes:  Denies change in visual acuity   HENT:  Denies nasal congestion or sore throat   Respiratory:  Positive for sob   Cardiovascular:  Positive for chest pain   GI:  Denies abdominal pain or bloody stools  :  Denies dysuria   Musculoskeletal:  Denies back pain or joint pain   Integument:  Denies rash   Neurologic:  Denies headache or sensory changes   Endocrine:  Denies polyuria or polydipsia   Lymphatic:  Denies swollen glands   Psychiatric:  Denies depression or anxiety     Past Medical and Surgical History:   Past Medical History:   Diagnosis Date    Abdominal pain     MARANDA [facility] kidney injury) ([facility] ) 6/19/2018    Cardiac disease     [facility]est[facility])     COPD, severe (HCC)     Coronary artery disease     DDD (degenerative disc disease), lumbar 9/1/2020    Diabetes mellitus ([facility] )     Dyspnea     GERD (gastroesophageal reflux disease)     Hyperlipidemia     Hypertension     MI (myocardial infarction) ([facility] )     with 3 stents    Nodule of apex of right lung     JACQUELINE (obstructive sleep apnea)     Prostate cancer (HCC)      Past Surgical History:   Procedure Laterality Date    ABDOMINAL SURGERY      exploratory    ANGIOPLASTY      3 stents    APPENDECTOMY      COLONOSCOPY  2015    CT NEEDLE BIOPSY LUNG  11/3/2020    ESOPHAGOGASTRODUODENOSCOPY N/A 10/2/2017    Procedure: ESOPHAGOGASTRODUODENOSCOPY (EGD); Surgeon: Erika Michele MD;  Location: [facility]; Service: Gastroenterology    IR THORACENTESIS  11/3/2020    KNEE CARTILAGE SURGERY      OTHER SURGICAL HISTORY      stent placement    PROSTATE SURGERY      SKIN GRAFT      Basal cell CA back       Meds/Allergies:  Medications Prior to Admission   Medication    BD Insulin Syringe U-500 31G X 6MM 0 5 ML MISC    cyclobenzaprine (FLEXERIL) 10 mg tablet    ferrous sulfate 325 (65 Fe) mg tablet    fluticasone-vilanterol (BREO ELLIPTA) 100-25 mcg/inh inhaler    furosemide (LASIX) 40 mg tablet    glucose blood (OneTouch Verio) test strip    HUMULIN R 500 UNIT/ML CONCENTRATED injection    metoprolol tartrate (LOPRESSOR) 25 mg tablet    potassium chloride (K-DUR,KLOR-CON) 20 mEq tablet    simvastatin (ZOCOR) 40 mg tablet    Trelegy Ellipta 100-62 5-25 MCG/INH inhaler       Allergies:    Allergies   Allergen Reactions    Crestor [Rosuvastatin] Other (See Comments)     Unknown      Metformin GI Intolerance     History:    Substance Use History:   Social History     Substance and Sexual Activity   Alcohol Use Never     Social History     Tobacco Use   Smoking Status Former Smoker    Packs/day: 1 50    Years: 50 00    Pack years: 75 00    Start date: 38    Quit date: 2020    Years since quittin 8   Smokeless Tobacco Never Used     Social History     Substance and Sexual Activity   Drug Use No       Family History:  Family History   Problem Relation Age of Onset    Heart disease Father     Other Father         Mesothelioma        Physical Exam:     Vitals:   Blood Pressure: 163/86 (22)  Pulse: 71 (22)  Temperature: 98 1 °F (36 7 °C) (22)  Temp Source: Oral (22)  Respirations: 20 (22 0151)  Height: 6' (182 9 cm) (22)  Weight - Scale: 98 2 kg (216 lb 9 6 oz) (22)  SpO2: 99 % (05/02/22 0300)    Constitutional:  Obese, Appears chronically ill  Eyes:  PERRL, conjunctiva normal   HENT:  Atraumatic, oropharynx moist  Neck- non-tender   Respiratory:  Decreased breath sounds on the right, no significant wheezing  Cardiovascular:  Normal rate, no murmurs, no gallops, no rubs   GI:  Soft, mildly distended, no guarding  :  No costovertebral angle tenderness   Musculoskeletal:  No edema, no tenderness, no deformities  Back- no tenderness  Integument:  Well hydrated, no rash   Lymphatic:  No lymphadenopathy noted   Neurologic:  Alert &awake, communicative, CN 2-12 normal,  no focal deficits noted         Lab Results: I have personally reviewed pertinent reports  Results from last 7 days   Lab Units 05/02/22  0309 05/01/22  2116 05/01/22  2116   WBC Thousand/uL 9 27   < > 7 05   HEMOGLOBIN g/dL 12 2   < > 11 9*   HEMATOCRIT % 40 9   < > 38 8   PLATELETS Thousands/uL 140*   < > 140*   NEUTROS PCT %  --   --  78*   LYMPHS PCT %  --   --  14   MONOS PCT %  --   --  7   EOS PCT %  --   --  0    < > = values in this interval not displayed  Results from last 7 days   Lab Units 05/02/22  0309   POTASSIUM mmol/L 4 4   CHLORIDE mmol/L 101   CO2 mmol/L 31   BUN mg/dL 20   CREATININE mg/dL 1 21   CALCIUM mg/dL 8 5   ALK PHOS U/L 90   ALT U/L 22   AST U/L 16     Results from last 7 days   Lab Units 05/02/22  0309   INR  0 90         Imaging: I have personally reviewed pertinent reports  X-ray chest 1 view portable    Result Date: 4/22/2022  Narrative: CHEST INDICATION:   chest pain  COMPARISON:  3/29/2022 CT EXAM PERFORMED/VIEWS:  XR CHEST PORTABLE FINDINGS: Chronic right lung volume loss and scarring  Persistent right pleural effusion  Cardiomediastinal silhouette appears unremarkable  No pneumothorax Osseous structures appear within normal limits for patient age       Impression: Persistent chronic right lung volume loss and pleural effusion Workstation performed: [id number]     CT chest wo contrast    Addendum Date: 5/2/2022 Addendum:   ADDENDUM: Airspace opacities within the left upper lobe which may represent infection  Recommend short-term follow-up chest CT scan in 3 months to evaluate for resolution  Result Date: 5/2/2022  Narrative: CT CHEST WITHOUT IV CONTRAST INDICATION:   Abnormal xray - lung opacity/opacities SOB, Hx Lung Cancer, R sided effsuion please evaluate if worsened from previous studies  COMPARISON:  March 29, 2022 TECHNIQUE: CT examination of the chest was performed without intravenous contrast   Axial, sagittal, and coronal 2D reformatted images were created from the source data and submitted for interpretation  Radiation dose length product (DLP) for this visit:  663 05 mGy-cm   This examination, like all CT scans performed in the [facility], was performed utilizing techniques to minimize radiation dose exposure, including the use of iterative  reconstruction and automated exposure control  FINDINGS: LUNGS:  Chronic right lung volume loss with linear scarring in the right upper lobe and lingula is again seen  Unchanged right basilar round atelectasis  Scattered airspace opacities within the left upper lobe are seen  Unchanged pulmonary emphysema is seen  PLEURA:  Right-sided pleural effusion is seen  HEART/GREAT VESSELS: Heavy atherosclerotic coronary artery calcification is noted  Heart is otherwise unremarkable  No thoracic aortic aneurysm  MEDIASTINUM AND JEREMÍAS:  Mediastinum is shifted to the right secondary to chronic lung volume loss CHEST WALL AND LOWER NECK:  Unremarkable  VISUALIZED STRUCTURES IN THE UPPER ABDOMEN:  Unremarkable  OSSEOUS STRUCTURES:  No acute fracture or destructive osseous lesion  Impression: Airspace opacities within the left upper lobe which may represent  Recommend short-term follow-up chest CT scan in 3 months to evaluate for resolution  The study was marked in [facility] for immediate notification   Workstation performed: [id number]         ** Please Note: Dragon 360 Dictation voice to text software was used in the creation of this document   **

## 2022-05-02 NOTE — ASSESSMENT & PLAN NOTE
-Chest heaviness  -Troponin negative, EKG without ischemic changes  -Previously seen several days ago at [facility] with similar chest pain and felt to not likely be cardiac in origin  -In setting of possible pneumonia  Plan:  >Treat for pneumonia as above in SOB section  If patient's pain continues despite treatment for pneumonia, consider cardiology consultation and repeat EKG/troponin

## 2022-05-02 NOTE — PLAN OF CARE
Problem: MOBILITY - ADULT  Goal: Maintain or return to baseline ADL function  Description: INTERVENTIONS:  -  Assess patient's ability to carry out ADLs; assess patient's baseline for ADL function and identify physical deficits which impact ability to perform ADLs (bathing, care of mouth/teeth, toileting, grooming, dressing, etc )  - Assess/evaluate cause of self-care deficits   - Assess range of motion  - Assess patient's mobility; develop plan if impaired  - Assess patient's need for assistive devices and provide as appropriate  - Encourage maximum independence but intervene and supervise when necessary  - Involve family in performance of ADLs  - Assess for home care needs following discharge   - Consider OT consult to assist with ADL evaluation and planning for discharge  - Provide patient education as appropriate  Outcome: Progressing  Goal: Maintains/Returns to pre admission functional level  Description: INTERVENTIONS:  - Perform BMAT or MOVE assessment daily    - Set and communicate daily mobility goal to care team and patient/family/caregiver  - Collaborate with rehabilitation services on mobility goals if consulted  - Perform Range of Motion 3 times a day  - Reposition patient every 3 hours    - Dangle patient 3 times a day  - Stand patient 3 times a day  - Ambulate patient 3 times a day  - Out of bed to chair 3 times a day   - Out of bed for meals 3 times a day  - Out of bed for toileting  - Record patient progress and toleration of activity level   Outcome: Progressing     Problem: Potential for Falls  Goal: Patient will remain free of falls  Description: INTERVENTIONS:  - Educate patient/family on patient safety including physical limitations  - Instruct patient to call for assistance with activity   - Consult OT/PT to assist with strengthening/mobility   - Keep Call bell within reach  - Keep bed low and locked with side rails adjusted as appropriate  - Keep care items and personal belongings within reach  - Initiate and maintain comfort rounds  - Make Fall Risk Sign visible to staff  - Apply yellow socks and bracelet for high fall risk patients  - Consider moving patient to room near nurses station  Outcome: Progressing     Problem: CARDIOVASCULAR - ADULT  Goal: Maintains optimal cardiac output and hemodynamic stability  Description: INTERVENTIONS:  - Monitor I/O, vital signs and rhythm  - Monitor for S/S and trends of decreased cardiac output  - Administer and titrate ordered vasoactive medications to optimize hemodynamic stability  - Assess quality of pulses, skin color and temperature  - Assess for signs of decreased coronary artery perfusion  - Instruct patient to report change in severity of symptoms  Outcome: Progressing  Goal: Absence of cardiac dysrhythmias or at baseline rhythm  Description: INTERVENTIONS:  - Continuous cardiac monitoring, vital signs, obtain 12 lead EKG if ordered  - Administer antiarrhythmic and heart rate control medications as ordered  - Monitor electrolytes and administer replacement therapy as ordered  Outcome: Progressing     Problem: RESPIRATORY - ADULT  Goal: Achieves optimal ventilation and oxygenation  Description: INTERVENTIONS:  - Assess for changes in respiratory status  - Assess for changes in mentation and behavior  - Position to facilitate oxygenation and minimize respiratory effort  - Oxygen administered by appropriate delivery if ordered  - Initiate smoking cessation education as indicated  - Encourage broncho-pulmonary hygiene including cough, deep breathe, Incentive Spirometry  - Assess the need for suctioning and aspirate as needed  - Assess and instruct to report SOB or any respiratory difficulty  - Respiratory Therapy support as indicated  Outcome: Progressing     Problem: METABOLIC, FLUID AND ELECTROLYTES - ADULT  Goal: Electrolytes maintained within normal limits  Description: INTERVENTIONS:  - Monitor labs and assess patient for signs and symptoms of electrolyte imbalances  - Administer electrolyte replacement as ordered  - Monitor response to electrolyte replacements, including repeat lab results as appropriate  - Instruct patient on fluid and nutrition as appropriate  Outcome: Progressing  Goal: Fluid balance maintained  Description: INTERVENTIONS:  - Monitor labs   - Monitor I/O and WT  - Instruct patient on fluid and nutrition as appropriate  - Assess for signs & symptoms of volume excess or deficit  Outcome: Progressing  Goal: Glucose maintained within target range  Description: INTERVENTIONS:  - Monitor Blood Glucose as ordered  - Assess for signs and symptoms of hyperglycemia and hypoglycemia  - Administer ordered medications to maintain glucose within target range  - Assess nutritional intake and initiate nutrition service referral as needed  Outcome: Progressing     Problem: PAIN - ADULT  Goal: Verbalizes/displays adequate comfort level or baseline comfort level  Description: Interventions:  - Encourage patient to monitor pain and request assistance  - Assess pain using appropriate pain scale  - Administer analgesics based on type and severity of pain and evaluate response  - Implement non-pharmacological measures as appropriate and evaluate response  - Consider cultural and social influences on pain and pain management  - Notify physician/advanced practitioner if interventions unsuccessful or patient reports new pain  Outcome: Progressing     Problem: INFECTION - ADULT  Goal: Absence or prevention of progression during hospitalization  Description: INTERVENTIONS:  - Assess and monitor for signs and symptoms of infection  - Monitor lab/diagnostic results  - Monitor all insertion sites, i e  indwelling lines, tubes, and drains  - Monitor endotracheal if appropriate and nasal secretions for changes in amount and color  - Parnell appropriate cooling/warming therapies per order  - Administer medications as ordered  - Instruct and encourage patient and family to use good hand hygiene technique  - Identify and instruct in appropriate isolation precautions for identified infection/condition  Outcome: Progressing  Goal: Absence of fever/infection during neutropenic period  Description: INTERVENTIONS:  - Monitor WBC    Outcome: Progressing     Problem: SAFETY ADULT  Goal: Maintain or return to baseline ADL function  Description: INTERVENTIONS:  -  Assess patient's ability to carry out ADLs; assess patient's baseline for ADL function and identify physical deficits which impact ability to perform ADLs (bathing, care of mouth/teeth, toileting, grooming, dressing, etc )  - Assess/evaluate cause of self-care deficits   - Assess range of motion  - Assess patient's mobility; develop plan if impaired  - Assess patient's need for assistive devices and provide as appropriate  - Encourage maximum independence but intervene and supervise when necessary  - Involve family in performance of ADLs  - Assess for home care needs following discharge   - Consider OT consult to assist with ADL evaluation and planning for discharge  - Provide patient education as appropriate  Outcome: Progressing  Goal: Maintains/Returns to pre admission functional level  Description: INTERVENTIONS:  - Perform BMAT or MOVE assessment daily    - Set and communicate daily mobility goal to care team and patient/family/caregiver  - Collaborate with rehabilitation services on mobility goals if consulted  - Perform Range of Motion 3 times a day  - Reposition patient every 3 hours    - Dangle patient 3 times a day  - Stand patient 3 times a day  - Ambulate patient 3 times a day  - Out of bed to chair 3 times a day   - Out of bed for meals 3 times a day  - Out of bed for toileting  - Record patient progress and toleration of activity level   Outcome: Progressing  Goal: Patient will remain free of falls  Description: INTERVENTIONS:  - Educate patient/family on patient safety including physical limitations  - Instruct patient to call for assistance with activity   - Consult OT/PT to assist with strengthening/mobility   - Keep Call bell within reach  - Keep bed low and locked with side rails adjusted as appropriate  - Keep care items and personal belongings within reach  - Initiate and maintain comfort rounds  - Make Fall Risk Sign visible to staff  - Offer Toileting every 3 Hours, in advance of need  - Initiate/Maintain 3alarm  - Obtain necessary fall risk management equipment: 35  - Apply yellow socks and bracelet for high fall risk patients  - Consider moving patient to room near nurses station  Outcome: Progressing     Problem: DISCHARGE PLANNING  Goal: Discharge to home or other facility with appropriate resources  Description: INTERVENTIONS:  - Identify barriers to discharge w/patient and caregiver  - Arrange for needed discharge resources and transportation as appropriate  - Identify discharge learning needs (meds, wound care, etc )  - Arrange for interpretive services to assist at discharge as needed  - Refer to Case Management Department for coordinating discharge planning if the patient needs post-hospital services based on physician/advanced practitioner order or complex needs related to functional status, cognitive ability, or social support system  Outcome: Progressing     Problem: Knowledge Deficit  Goal: Patient/family/caregiver demonstrates understanding of disease process, treatment plan, medications, and discharge instructions  Description: Complete learning assessment and assess knowledge base    Interventions:  - Provide teaching at level of understanding  - Provide teaching via preferred learning methods  Outcome: Progressing

## 2022-05-02 NOTE — OCCUPATIONAL THERAPY NOTE
Occupational Therapy Evaluation     Patient Name: Magdalena Rosales  Today's Date: 5/2/2022  Problem List  Principal Problem:    Shortness of breath  Active Problems:    COPD, severe (HCC)    Chronic diastolic heart failure (HCC)    History of lung cancer    Chronic respiratory failure with hypoxia (HCC)    Atypical chest pain    Past Medical History  Past Medical History:   Diagnosis Date    Abdominal pain     [facility] ([facility]) ([facility] ) 6/19/2018    Cardiac disease     CHF (congestive heart failure) (HCC)     COPD, severe (HCC)     Coronary artery disease     DDD (degenerative disc disease), lumbar 9/1/2020    Diabetes mellitus ([facility] )     Dyspnea     GERD (gastroesophageal reflux disease)     Hyperlipidemia     Hypertension     MI (myocardial infarction) ([facility] )     with 3 stents    Nodule of apex of right lung     JACQUELINE (obstructive sleep apnea)     Prostate cancer [facility])      Past Surgical History  Past Surgical History:   Procedure Laterality Date    ABDOMINAL SURGERY      exploratory    ANGIOPLASTY      3 stents    APPENDECTOMY      COLONOSCOPY  2015    CT NEEDLE BIOPSY LUNG  11/3/2020    ESOPHAGOGASTRODUODENOSCOPY N/A 10/2/2017    Procedure: ESOPHAGOGASTRODUODENOSCOPY (EGD); Surgeon: Fay Whaley MD;  [facility]; Service: Gastroenterology    IR THORACENTESIS  11/3/2020    KNEE CARTILAGE SURGERY      OTHER SURGICAL HISTORY      stent placement    PROSTATE SURGERY      SKIN GRAFT      Basal cell CA back        05/02/22 0823   OT Last Visit   OT Visit Date 05/02/22   Note Type   Note type Evaluation   Restrictions/Precautions   Weight Bearing Precautions Per Order No   Other Precautions Multiple lines;O2;Fall Risk;Telemetry   Pain Assessment   Pain Assessment Tool 0-10   Pain Score No Pain   Home Living   Type of Home House   Home Layout Two level;Performs ADLs on one level; Able to live on main level with bedroom/bathroom  (0ste)   Bathroom Shower/Tub Walk-in shower Bathroom Toilet Raised   Bathroom Equipment Grab bars in shower;Grab bars around toilet   [address] Cane;Electric scooter   Prior Function   Level of [place] Independent with ADLs and functional mobility   Lives With [employer] Help From Family   ADL Assistance Independent   IADLs Independent   Falls in the last 6 months 0   Vocational Retired   Lifestyle   Autonomy pta, pt was I w ADL/IADL, reports use of SPC for short community mobility, if performing long distanced mobility, used electric scooter  In the home, he performs FM without AD  +    Reciprocal Relationships supportive son, can A as needed  Service to Others retired    Intrinsic Gratification watching tv   Psychosocial   Psychosocial (WDL) X   Patient Behaviors/Mood Flat affect   Subjective   Subjective "I don't really need help "   ADL   Where Assessed Edge of bed   Eating Assistance 7  Independent   Grooming Assistance 7  Independent   UB Bathing Assistance 7  Independent   LB Bathing Assistance 6  Modified Independent   UB Dressing Assistance 7  Independent   LB Dressing Assistance 6  Modified independent   [address]  6  Modified independent   [address] 6  Modified independent   Bed Mobility   Additional Comments pt found sitting EOB, left sitting EOB w all needs in reach    Transfers   Sit to Stand 5  Supervision   Additional items Increased time required   Stand to Sit 5  Supervision   Additional items Increased time required   Additional Comments pt declining AD at this time, reports this is his baseline    Functional Mobility   Functional Mobility 5  Supervision   Additional Comments inc time required, a bit impulsive   refusing AD    Balance   Static Sitting Good   Dynamic Sitting Fair +   Static Standing Fair   Dynamic Standing Fair -   Ambulatory Fair -   Activity Tolerance   Activity Tolerance Patient limited by fatigue   Medical Staff Made Aware DPT Lizzie 2' pts med complexity, comorbidities and regression from baseline    Nurse Made Aware ok per RN   RUE Assessment   RUE Assessment WFL   LUE Assessment   LUE Assessment WFL   Hand Function   Gross Motor Coordination Functional   Fine Motor Coordination Functional   Cognition   Overall Cognitive Status WFL   Arousal/Participation Alert; Responsive   Attention Within functional limits   Orientation Level Oriented X4   Memory Within functional limits   Following Commands Follows one step commands without difficulty   Assessment   Prognosis Good   Assessment Pt is a 78 y o  male admitted 5/1/22 with SOB  Pt w active OT eval and treat orders  PMH includes  has a past medical history of Abdominal pain, MARANDA (acute kidney injury) ([facility] ), Cardiac disease, CHF (congestive heart failure) ([facility] ), COPD, severe ([facility] ), Coronary artery disease, DDD (degenerative disc disease), lumbar, Diabetes mellitus ([facility] ), Dyspnea, GERD (gastroesophageal reflux disease), Hyperlipidemia, Hypertension, MI (myocardial infarction) ([facility] ), Nodule of apex of right lung, JACQUELINE (obstructive sleep apnea), and Prostate cancer ([facility] )  He has no past medical history of Asthma, Disease of thyroid gland, History of transfusion, Psychiatric disorder, or TIA (transient ischemic attack)  Pt lives w son in a 2 SH with 0 VINI, repotrs first floor set-up including walk-in shower with grab bars, raised toilet with grab bars  Pta, pt was independent w/ ADL/IADL  Pt reports using a cane for short distanced mobility, electric scooter for long distanced mobility, was driving  Currently, pt is ind for UB ADL, mod I for LB ADL and completed transfers/FM without AD, as pt refusing use of AD while in hospital, reporting this is his baseline  From OT standpoint, pt is functioning at baseline level and does not appear to require additional acute OT at this time   Discussed pt's comfort with d/c from OT and any concerns with returning to previous environment; pt does not report any at this time  The patient's raw score on the AM-PAC Daily Activity inpatient short form is 24, standardized score is 57 54, greater than 39 4  Patients at this level are likely to benefit from discharge to home  Please refer to the recommendation of the Occupational Therapist for safe discharge planning  From OT perspective, pt should D/C HOME when medically stable  Acute OT no longer rq at this time, D/C OT      Goals   Patient Goals to go back to bed    Recommendation   OT Discharge Recommendation No rehabilitation needs   OT - OK to Discharge Yes   AM-PAC Daily Activity Inpatient   Lower Body Dressing 4   Bathing 4   Toileting 4   Upper Body Dressing 4   Grooming 4   Eating 4   Daily Activity Raw Score 24   Daily Activity Standardized Score (Calc for Raw Score >=11) 57 54   AM-PAC Applied Cognition Inpatient   Following a Speech/Presentation 4   Understanding Ordinary Conversation 4   Taking Medications 4   Remembering Where Things Are Placed or Put Away 4   Remembering List of 4-5 Errands 4   Taking Care of Complicated Tasks 4   Applied Cognition Raw Score 24   Applied Cognition Standardized Score 62 21   Modified [place] Scale   Modified [place] Scale 3       Kimberly Rasheed, MOT, OTR/L

## 2022-05-02 NOTE — UTILIZATION REVIEW
Initial Clinical Review  Observation on 05/02 @ 0026 upgraded to Inpatient on 05/03 @ 1506  Pt requiring continued stay d/t continued treatment of COPD    Admission: Date/Time/Statement:   Admission Orders (From admission, onward)     Ordered        05/03/22 1506  Inpatient Admission  Once            05/02/22 0026  Place in Observation  Once                      Orders Placed This Encounter   Procedures    Inpatient Admission     Standing Status:   Standing     Number of Occurrences:   1     Order Specific Question:   Level of Care     Answer:   Med Surg [16]     Order Specific Question:   Estimated length of stay     Answer:   More than 2 Midnights     Order Specific Question:   Certification     Answer:   I certify that inpatient services are medically necessary for this patient for a duration of greater than two midnights  See H&P and MD Progress Notes for additional information about the patient's course of treatment  ED Arrival Information     Expected Arrival Acuity    - 5/1/2022 20:42 Emergent         Means of arrival Escorted by Service Admission type    Ambulance [facility] Emergency         Arrival complaint    Shortness of Breath        Chief Complaint   Patient presents with    Shortness of Breath     pt [facility] by [facility] with c/o shortness of breath x 45 minutes, chest pain following  took symbicort prior to ems arrival  hx COPD/chf, c/o nonproductive cough       Initial Presentation: 78 y o  male from home presented to the ED with SOB that started this am  PMH of Chronic hypoxic respiratory failure on baseline 2-3L at rest, COPD, CHF, Lung Cancer, T2D  Pt was recently admitted and discharged on 4/24 for similar symptoms, diagnosed with copd/chf exacerbation and was treated with Oral steroids, home diuretics and nebulizers with reported improvement  Reports that he tried home nebulizer with no improvement   He also reports chest heaviness that he had last admission, fatigue and productive cough  In the ED, BNP elevated at 1322  CXR with evidence or R sided effusion  EKG: Sinus, rate 91, 1st degree AV block, RBBB, nonspecific st changes  Similar to previous EKG  CT Chest without contrast in ER: Airspace opacities within the left upper lobe which may represent infection  On exam, no wheezing, decreased breath sounds on the R  Given Oral Prednisone, IV lasix and duonebs  Admit as observation level of care for Non-Severe CAP, acute on chronic resp failure d/t COPD exacerbation:  Ceftriaxone and Azithromycin, Order blood cultures and sputum cx/gram stain  consult Pulmonology, Continue pta inhalers, Continue pta lasix, Daily labs, Pulmonary hygiene    05/02 Pulmonology Consult: Chronic respiratory failure secondary to severe COPD: D/t pt's technique and the inhalers he's on is not getting to his lungs given his poor lung function and chronic volume loss -  will switch his regimen to nebulizers for now  - start atrovent/xopenex TID  - Perforomist and Pulmicort nebs BID  Continue CTX/Azithro for now, likely d/c or adjust tomorrow  IS      05/03 Pulmonology Notes:  No acute overnight events  On RA  Pt reports feeling worse with nebulizer tx  On exam, Pulm diminished BS diffusely but slightly increased air movement, no rales or wheeze  Cont to mon off steroids  Continue xopenex/atrovent TID can consider Duoneb prn as outpatient  Resume Breo 100mcg   DC abx   IS      ED Triage Vitals   Temperature Pulse Respirations Blood Pressure SpO2   05/01/22 2051 05/01/22 2051 05/01/22 2051 05/01/22 2051 05/01/22 2051   98 3 °F (36 8 °C) 90 (!) 24 (!) 188/85 97 %      Temp Source Heart Rate Source Patient Position - Orthostatic VS BP Location FiO2 (%)   05/01/22 2051 05/01/22 2051 05/02/22 0151 05/02/22 0151 --   Oral Monitor Lying Left arm       Pain Score       05/02/22 0324       No Pain          Wt Readings from Last 1 Encounters:   05/03/22 99 7 kg (219 lb 12 8 oz)     Additional Vital Signs:   Date/Time Temp Pulse Resp BP MAP (mmHg) SpO2 Calculated FIO2 (%) - Nasal Cannula Nasal Cannula O2 Flow Rate (L/min) O2 Device Patient Position - Orthostatic VS   05/03/22 14:32:50 98 3 °F (36 8 °C) 71 18 146/71 96 94 % -- -- -- --   05/03/22 1404 -- -- -- -- -- 94 % -- -- -- --   05/03/22 10:52:31 98 1 °F (36 7 °C) 63 18 130/59 83 93 % -- -- -- --   05/03/22 0908 -- -- -- -- -- -- -- -- None (Room air) --   05/03/22 0830 -- -- -- -- -- 95 % -- -- -- --   05/03/22 07:34:54 97 7 °F (36 5 °C) 76 -- 151/80 104 93 % -- -- -- --   05/03/22 02:25:10 98 2 °F (36 8 °C) 67 20 143/74 97 93 % -- -- -- --   05/02/22 21:57:48 98 5 °F (36 9 °C) 67 20 124/47 Abnormal  73 90 % -- -- -- --   05/02/22 21:11:08 -- 91 -- 132/63 86 93 % -- -- -- --   05/02/22 2109 -- 91 -- 132/63 -- -- -- -- -- --   05/02/22 1953 -- -- -- -- -- 93 % -- -- -- --   05/02/22 19:31:49 98 7 °F (37 1 °C) 63 20 139/49 Abnormal  79 91 % -- -- -- --   05/02/22 1930 -- -- -- -- -- -- -- -- None (Room air) --   05/02/22 14:22:05 98 7 °F (37 1 °C) 75 19 148/66 93 94 % -- -- -- Lying   05/02/22 1300 -- -- -- -- -- 93 % -- -- -- --   05/02/22 10:39:50 98 2 °F (36 8 °C) 74 18 149/65 93 94 % -- -- -- Sitting   05/02/22 10:38:19 98 2 °F (36 8 °C) 74 -- 149/65 93 95 % -- -- -- --       Date/Time Temp Pulse Resp BP MAP (mmHg) SpO2 Calculated FIO2 (%) - Nasal Cannula Nasal Cannula O2 Flow Rate (L/min) O2 Device Patient Position - Orthostatic VS   05/02/22 08:59:10 98 4 °F (36 9 °C) 83 19 154/111 Abnormal  125 92 % -- -- -- --   05/02/22 0816 -- 82 -- 160/79 -- -- -- -- -- --   05/02/22 0746 -- -- -- -- -- 92 % 28 2 L/min Nasal cannula --   05/02/22 0704 -- -- -- -- -- 95 % 28 2 L/min Nasal cannula --   05/02/22 0300 -- -- -- -- -- 99 % -- -- -- --   05/02/22 0151 -- -- 20 -- -- -- 28 2 L/min Nasal cannula Lying   05/02/22 01:47:35 98 1 °F (36 7 °C) 71 -- 163/86 112 -- -- -- -- --   05/01/22 2319 -- 72 18 169/77 -- 100 % -- -- None (Room air) --   05/01/22 2122 -- -- -- -- -- 96 % -- -- -- --   05/01/22 2052 -- -- -- -- -- -- -- -- Nasal cannula --       Pertinent Labs/Diagnostic Test Results:   CT chest wo contrast   Final Result by Dano Hernandez DO (05/02 7869)   Addendum 1 of 1 by Dano Hernandez DO (05/02 3053)   ADDENDUM:      Airspace opacities within the left upper lobe which may represent    infection  Recommend short-term follow-up chest CT scan in 3 months to    evaluate for resolution  Final      Airspace opacities within the left upper lobe which may represent  Recommend short-term follow-up chest CT scan in 3 months to evaluate for resolution  The study was marked in [facility] for immediate notification  Workstation performed: [id number]         XR chest 2 views   Final Result by Yin Raymond DO (05/02 6046)      Stable chest       Increased density right base likely a combination of pleural effusion and/or atelectasis, unchanged                 Workstation performed: [id number]               Results from last 7 days   Lab Units 05/02/22  0309 05/01/22  2116   WBC Thousand/uL 9 27 7 05   HEMOGLOBIN g/dL 12 2 11 9*   HEMATOCRIT % 40 9 38 8   PLATELETS Thousands/uL 140* 140*   NEUTROS ABS Thousands/µL  --  5 46   BANDS PCT % 4  --          Results from last 7 days   Lab Units 05/02/22  0309 05/01/22  2116   SODIUM mmol/L 136 140   POTASSIUM mmol/L 4 4 4 3   CHLORIDE mmol/L 101 105   CO2 mmol/L 31 35*   ANION GAP mmol/L 4 0*   BUN mg/dL 20 22   CREATININE mg/dL 1 21 1 12   EGFR ml/min/1 73sq m 58 64   CALCIUM mg/dL 8 5 8 5     Results from last 7 days   Lab Units 05/02/22  0309   AST U/L 16   ALT U/L 22   ALK PHOS U/L 90   TOTAL PROTEIN g/dL 7 4   ALBUMIN g/dL 3 0*   TOTAL BILIRUBIN mg/dL 0 21     Results from last 7 days   Lab Units 05/03/22  1627 05/03/22  1050 05/03/22  0557 05/02/22  2042 05/02/22  1521 05/02/22  1039 05/02/22  0625 05/02/22  0230   POC GLUCOSE mg/dl 180* 300* 211* 197* 178* 363* 376* 194*     Results from last 7 days Lab Units 05/02/22  0309 05/01/22 2116   GLUCOSE RANDOM mg/dL 286* 226*             BETA-HYDROXYBUTYRATE   Date Value Ref Range Status   06/15/2019 0 2 <0 6 mmol/L Final                      Results from last 7 days   Lab Units 05/02/22  0309 05/01/22  2328 05/01/22  2116   HS TNI 0HR ng/L  --   --  21   HS TNI 2HR ng/L  --  24  --    HSTNI D2 ng/L  --  3  --    HS TNI 4HR ng/L 20  --   --    HSTNI D4 ng/L -1  --   --          Results from last 7 days   Lab Units 05/02/22  0309   PROTIME seconds 11 8   INR  0 90   PTT seconds 26     Results from last 7 days   Lab Units 05/02/22  0309   TSH 3RD GENERATON uIU/mL 0 544     Results from last 7 days   Lab Units 05/02/22  0454   PROCALCITONIN ng/ml 0 10                 Results from last 7 days   Lab Units 05/01/22  2116   NT-PRO BNP pg/mL 1,322*     ED Treatment:   Medication Administration from 05/01/2022 2042 to 05/02/2022 0146       Date/Time Order Dose Route Action     05/01/2022 2121 albuterol inhalation solution 10 mg 10 mg Nebulization Given     05/01/2022 2121 ipratropium (ATROVENT) 0 02 % inhalation solution 1 mg 1 mg Nebulization Given     05/01/2022 2121 sodium chloride 0 9 % inhalation solution 3 mL 3 mL Nebulization Given     05/01/2022 2116 predniSONE tablet 50 mg 50 mg Oral Given     05/01/2022 2316 furosemide (LASIX) injection 20 mg 20 mg Intravenous Given        Past Medical History:   Diagnosis Date    Abdominal pain     [facility]DA [facility] ) 6/19/2018    Cardiac disease     [facility]congestive heart failure) ([facility])     COPD, severe ([facility] )     Coronary artery disease     DDD (degenerative disc disease), lumbar 9/1/2020    Diabetes mellitus ([facility] )     Dyspnea     GERD (gastroesophageal reflux disease)     Hyperlipidemia     Hypertension     MI (myocardial infarction) ([facility] )     with 3 stents    Nodule of apex of right lung     JACQUELINE (obstructive sleep apnea)     Prostate cancer ([facility] )      Present on Admission:   Shortness of breath   Atypical chest pain   COPD, severe (HCC)   Chronic diastolic heart failure (HCC)   Chronic respiratory failure with hypoxia (HCC)      Admitting Diagnosis: Shortness of breath [R06 02]  CHF (congestive heart failure) (HCC) [I50 9]  Chest pain [R07 9]  Dyspnea [R06 00]  COPD with acute exacerbation (HCC) [J44 1]  Age/Sex: 78 y o  male  Admission Orders:  SCD  Daily weights  Strict I/O  IS  Fluid restriction 1500 ml    Scheduled Medications:  atorvastatin, 20 mg, Oral, Daily With Dinner  ferrous sulfate, 325 mg, Oral, Daily With Breakfast  fluticasone-vilanterol, 1 puff, Inhalation, Daily  furosemide, 40 mg, Oral, Daily  heparin (porcine), 5,000 Units, Subcutaneous, Q8H [address]  insulin lispro, 1-5 Units, Subcutaneous, 4x Daily (AC & HS)  insulin regular, 45 Units, Subcutaneous, BID AC  ipratropium, 0 5 mg, Nebulization, TID  levalbuterol, 1 25 mg, Nebulization, TID  melatonin, 3 mg, Oral, HS  metoprolol tartrate, 25 mg, Oral, BID      Continuous IV Infusions: none     PRN Meds:  acetaminophen, 650 mg, Oral, Q8H PRN 05/03  ipratropium-albuterol, 3 mL, Nebulization, Q6H PRN        IP CONSULT TO NUTRITION SERVICES  IP CONSULT TO PULMONOLOGY    Network Utilization Review Department  ATTENTION: Please call with any questions or concerns to [phone] and carefully listen to the prompts so that you are directed to the right person  All voicemails are confidential   Michael Gong all requests for admission clinical reviews, approved or denied determinations and any other requests to dedicated fax number below belonging to the campus where the patient is receiving treatment   List of dedicated fax numbers for the Facilities:  [address] DENIALS (Administrative/Medical Necessity) [phone]   [address] [place] [address] (Maternity/NICU/Pediatrics) 261 [facility],[address] Floor [address]  [address] [place] [address] [address] [place] [address] [address] 9969 [facility] 951 [phone]

## 2022-05-02 NOTE — CASE MANAGEMENT
Case Management Assessment & Discharge Planning Note    Patient name Aurther Card   Location [facility] 016-30 MRN [id number]  : 1945 Date 2022       Current Admission Date: 2022  Current Admission Diagnosis:Shortness of breath   Patient Active Problem List    Diagnosis Date Noted    Shortness of breath 2022    Atypical chest pain 2022    CKD (chronic kidney disease) stage 3, GFR 30-59 ml/min ([facility] ) 2022    History of prostate cancer 2022    UTI (urinary tract infection) 2022    Adenocarcinoma of lung ([facility] ) 2022    Fracture of navicular bone of left foot 2021    Chronic respiratory failure with hypoxia ([facility] ) 04/15/2021    Obesity, morbid ([place] [address] ) 2021    PAD (peripheral artery disease) ([facility] ) 2021    History of lung cancer 2020    DDD (degenerative disc disease), lumbar 2020    Anemia, iron deficiency 2020    Lung nodule 2020    Pulmonary hypertension ([facility] ) 2019    JACQUELINE (obstructive sleep apnea) 2019    COPD with acute exacerbation ([facility] ) 2019    Oxygen dependent 2018    RBBB 2018    CAD (coronary artery disease) 2018    Chronic diastolic heart failure ([facility] ) 2018    Pleural effusion on right 10/31/2017    COPD, severe ([facility] ) 2017    Diabetic neuropathy ([facility] ) 2017    Essential hypertension 2016    Venous stasis dermatitis of both lower extremities 11/15/2016    Type 2 diabetes mellitus with hyperglycemia, with long-term current use of insulin ([facility] ) 2016    COPD exacerbation ([facility] ) 2016    Obesity (BMI 30-39 9) 2016    HLD (hyperlipidemia) 2016      LOS (days): 0  Geometric Mean LOS (GMLOS) (days):   Days to GMLOS:     OBJECTIVE:              Current admission status: Observation       Preferred Pharmacy:   [address] [facility], [address] [place] [address]  Phone: [phone] Fax: [phone]    Primary Care Provider: Chasity Paige MD    Primary Insurance: [facility]  Secondary Insurance:     ASSESSMENT:  [address] [facility] - Son   Primary Phone: [phone] (Mobile)                    Obs Notice Signed: 05/02/22 (9:45am)      DISCHARGE DETAILS:     Additional Comments: Patient is currently under observation   The Observation notice was explained and completed at 9:45am

## 2022-05-02 NOTE — ASSESSMENT & PLAN NOTE
Wt Readings from Last 3 Encounters:   05/02/22 98 2 kg (216 lb 9 6 oz)   04/24/22 99 7 kg (219 lb 12 8 oz)   02/22/22 100 kg (220 lb 10 9 oz)       -Weight 3lbs less than when patient was recently discharged  -Hx HFpEF  -BNP elevated at 1322  -On pta lasix   Plan:  >Continue pta lasix and toprol  Treating for pneumonia

## 2022-05-02 NOTE — RESPIRATORY THERAPY NOTE
RT Protocol Note  Brianna Doyle Sr  78 y o  male MRN: [id number]  Unit/Bed#: [facility] 026-07 Encounter: [id number]    Assessment    Principal Problem:    Shortness of breath  Active Problems:    COPD, severe (HCC)    Chronic diastolic heart failure (HCC)    History of lung cancer    Chronic respiratory failure with hypoxia (HCC)    Atypical chest pain      Home Pulmonary Medications:  Breo ellipta       Past Medical History:   Diagnosis Date    Abdominal pain     [facility]ANDA [facility]) ([facility] ) 2018    Cardiac disease     [facility])     COPD, severe (HCC)     Coronary artery disease     DDD (degenerative disc disease), lumbar 2020    Diabetes mellitus ([facility] )     Dyspnea     GERD (gastroesophageal reflux disease)     Hyperlipidemia     Hypertension     MI (myocardial infarction) ([place] [address] )     with 3 stents    Nodule of apex of right lung     JAQCUELINE (obstructive sleep apnea)     Prostate cancer ([facility])      Social History     Socioeconomic History    Marital status:      Spouse name: None    Number of children: 1    Years of education: 8    Highest education level: None   Occupational History    None   Tobacco Use    Smoking status: Former Smoker     Packs/day: 1 50     Years: 50 00     Pack years: 75 00     Start date: 38     Quit date: 2020     Years since quittin 8    Smokeless tobacco: Never Used   Vaping Use    Vaping Use: Never used   Substance and Sexual Activity    Alcohol use: Never    Drug use: No    Sexual activity: Never     Partners: Female   Other Topics Concern    None   Social History Narrative    Most recent tobacco use screenin2018    Do you currently or have you served in the [employer]: No    Were you activated, into active duty, as a member of the [employer] or as a Reservist: No    Caffeine intake: Moderate    Alcohol intake: None    Marital status:      Number of children: 1    Education: 8 Occupation: retired    Sexual orientation: Heterosexual    General stress level: Medium    Live alone or with others: with others    Exercise level: None    Sexually active: No    Overweight: Yes    Obese: Yes    Guns present in home: No    Seat belts used routinely: Yes    Advance directive: No    Sunscreen used routinely: No    Smoke alarm in home: Yes    Legally blind in one or both eyes: No    Hard of hearing or deaf in one or both ears: No     Social Determinants of Health     Financial Resource Strain: Not on file   Food Insecurity: No Food Insecurity    Worried About Running Out of Food in the Last Year: Never true    Kenyatta of Food in the Last Year: Never true   Transportation Needs: No Transportation Needs    Lack of Transportation (Medical): No    Lack of Transportation (Non-Medical): No   Physical Activity: Not on file   Stress: Not on file   Social Connections: Not on file   Intimate Partner Violence: Not on file   Housing Stability: Low Risk     Unable to Pay for Housing in the Last Year: No    Number of Places Lived in the Last Year: 1    Unstable Housing in the Last Year: No       Subjective         Objective    Physical Exam:   Assessment Type: Assess only  General Appearance: Awake (eating)  Respiratory Pattern: Normal  Bilateral Breath Sounds: Diminished,Clear    Vitals:  Blood pressure 163/86, pulse 71, temperature 98 1 °F (36 7 °C), temperature source Oral, resp  rate 20, height 6' (1 829 m), weight 98 2 kg (216 lb 9 6 oz), SpO2 (P) 99 %  Imaging and other studies: I have personally reviewed pertinent reports  Plan    Respiratory Plan: Mild Distress pathway        Resp Comments: (P) Pt is 75y/o male admitted for shortness of breathe  History of chf and copd  There is no home udn use listed in chart  Pt was given a 1 hour neb in er  Breathe sounds are diminished and clear   Will start pt on udns tid at this time and continue to moniter pt per protocol

## 2022-05-02 NOTE — ASSESSMENT & PLAN NOTE
-On pta Breo and trelegy?  -Given prednisone in ER as well as duoneb without improvement  -Not wheezing on exam  Plan:  >Treat for pneumonia  Continue pta breo and prn duonebs   Consult Pulm

## 2022-05-02 NOTE — ED PROVIDER NOTES
History  Chief Complaint   Patient presents with    Shortness of Breath     pt [facility] with c/o shortness of breath x 45 minutes, chest pain following  took symbicort prior to ems arrival  hx COPD/chf, c/o nonproductive cough     77 y/o male with hx of HTN, DM, CAD, COPD, and CHF presents to the ED for evaluation of chest pain and worsening dyspnea that started 45 minutes ago  He describes his pain as a pressure across his anterior chest/sternum, nonradiating  He uses 2 L nasal cannula oxygen at home intermittently  He has a chronic cough, which he reports is unchanged and at baseline  He used his home Symbicort with no significant relief  He denies fever, chills, abdominal pain, nausea, vomiting, diarrhea, back pain, neck pain, headache, numbness, and weakness  He has been seen in the ER several times for similar symptoms and has been admitted for CHF and COPD exacerbations  Prior to Admission Medications   Prescriptions Last Dose Informant Patient Reported? Taking? BD Insulin Syringe U-500 31G X 6MM 0 5 ML MISC   No No   Sig: USE 1 SYRINGE THREE TIMES A DAY FOR INJECTING INSULIN   HUMULIN R 500 UNIT/ML CONCENTRATED injection   No No   Sig: INJECT 45 UNITS TWICE DAILY BEFORE BREAKFAST AND LUNCH   Trelegy Ellipta 100-62 5-25 MCG/INH inhaler   No No   Sig: INHALE 1 PUFF BY MOUTH DAILY *RINSE MOUTH AFTER USE*   cyclobenzaprine (FLEXERIL) 10 mg tablet   No No   Sig: Take 1 tablet (10 mg total) by mouth 3 (three) times a day for 10 days   ferrous sulfate 325 (65 Fe) mg tablet   No No   Sig: Take 1 tablet (325 mg total) by mouth daily with breakfast   fluticasone-vilanterol (BREO ELLIPTA) 100-25 mcg/inh inhaler   No No   Sig: Inhale 1 puff daily Rinse mouth after use  furosemide (LASIX) 40 mg tablet   No No   Sig: Take 1 tablet (40 mg total) by mouth daily   glucose blood (OneTouch Verio) test strip   No No   Sig: May substitute brand based on insurance coverage  Check glucose QID  potassium chloride (K-DUR,KLOR-CON) 20 mEq tablet   No No   Sig: Take 1 tablet (20 mEq total) by mouth daily   simvastatin (ZOCOR) 40 mg tablet   No No   Sig: TAKE ONE TABLET BY MOUTH EVERY DAY      Facility-Administered Medications: None       Past Medical History:   Diagnosis Date    Abdominal pain     MARANDA (acute kidney injury) ([place] [address] ) 2018    Cardiac disease     [facility]     COPD, severe (HCC)     Coronary artery disease     DDD (degenerative disc disease), lumbar 2020    Diabetes mellitus ([place] [address] )     Dyspnea     GERD (gastroesophageal reflux disease)     Hyperlipidemia     Hypertension     MI (myocardial infarction) ([place] [address] )     with 3 stents    Nodule of apex of right lung     JACQUELINE (obstructive sleep apnea)     Prostate cancer [facility])        Past Surgical History:   Procedure Laterality Date    ABDOMINAL SURGERY      exploratory    ANGIOPLASTY      3 stents    APPENDECTOMY      COLONOSCOPY      CT NEEDLE BIOPSY LUNG  11/3/2020    ESOPHAGOGASTRODUODENOSCOPY N/A 10/2/2017    Procedure: ESOPHAGOGASTRODUODENOSCOPY (EGD); Surgeon: Edgardo Romero MD;  [facility]: [facility]; Service: Gastroenterology    IR THORACENTESIS  11/3/2020    KNEE CARTILAGE SURGERY      OTHER SURGICAL HISTORY      stent placement    PROSTATE SURGERY      SKIN GRAFT      Basal cell CA back       Family History   Problem Relation Age of Onset    Heart disease Father     Other Father         Mesothelioma      I have reviewed and agree with the history as documented      E-Cigarette/Vaping    E-Cigarette Use Never User      E-Cigarette/Vaping Substances    Nicotine No     THC No     CBD No     Flavoring No     Other No     Unknown No      Social History     Tobacco Use    Smoking status: Former Smoker     Packs/day: 1 50     Years: 50 00     Pack years: 75 00     Start date: 38     Quit date: 2020     Years since quittin 8    Smokeless tobacco: Never Used   Vaping Use  Vaping Use: Never used   Substance Use Topics    Alcohol use: Never    Drug use: No        Review of Systems   Constitutional: Negative for chills and fever  HENT: Negative for congestion, rhinorrhea and sore throat  Respiratory: Positive for chest tightness and shortness of breath  Negative for cough  Cardiovascular: Positive for chest pain  Negative for palpitations  Gastrointestinal: Negative for abdominal pain, diarrhea, nausea and vomiting  Genitourinary: Negative for dysuria and hematuria  Musculoskeletal: Negative for back pain and neck pain  Neurological: Negative for weakness, light-headedness, numbness and headaches  All other systems reviewed and are negative  Physical Exam  ED Triage Vitals   Temperature Pulse Respirations Blood Pressure SpO2   05/01/22 2051 05/01/22 2051 05/01/22 2051 05/01/22 2051 05/01/22 2051   98 3 °F (36 8 °C) 90 (!) 24 (!) 188/85 97 %      Temp Source Heart Rate Source Patient Position - Orthostatic VS BP Location FiO2 (%)   05/01/22 2051 05/01/22 2051 05/02/22 0151 05/02/22 0151 --   Oral Monitor Lying Left arm       Pain Score       05/02/22 0324       No Pain             Orthostatic Vital Signs  Vitals:    05/03/22 0225 05/03/22 0734 05/03/22 1052 05/03/22 1432   BP: 143/74 151/80 130/59 146/71   Pulse: 67 76 63 71   Patient Position - Orthostatic VS:           Physical Exam  Vitals and nursing note reviewed  Constitutional:       General: He is not in acute distress  Appearance: Normal appearance  He is well-developed  He is obese  He is ill-appearing (Chronically)  HENT:      Head: Normocephalic and atraumatic  Right Ear: External ear normal       Left Ear: External ear normal       Nose: Nose normal       Mouth/Throat:      Mouth: Mucous membranes are moist       Pharynx: Oropharynx is clear  No oropharyngeal exudate or posterior oropharyngeal erythema  Eyes:      Extraocular Movements: Extraocular movements intact  Conjunctiva/sclera: Conjunctivae normal       Pupils: Pupils are equal, round, and reactive to light  Cardiovascular:      Rate and Rhythm: Normal rate and regular rhythm  Pulses: Normal pulses  Heart sounds: Normal heart sounds  Pulmonary:      Effort: Tachypnea present  No respiratory distress  Breath sounds: Wheezing present  Comments: Bilateral wheezing and tight air exchange  Mild tachypnea  Chest:      Chest wall: No tenderness  Abdominal:      General: Abdomen is flat  Bowel sounds are normal  There is no distension  Palpations: Abdomen is soft  Tenderness: There is no abdominal tenderness  There is no right CVA tenderness, left CVA tenderness or guarding  Musculoskeletal:         General: No swelling or tenderness  Normal range of motion  Cervical back: Normal range of motion and neck supple  No tenderness  Skin:     General: Skin is warm and dry  Neurological:      General: No focal deficit present  Mental Status: He is alert and oriented to person, place, and time           ED Medications  Medications   albuterol inhalation solution 10 mg (10 mg Nebulization Given 5/1/22 2121)     And   ipratropium (ATROVENT) 0 02 % inhalation solution 1 mg (1 mg Nebulization Given 5/1/22 2121)     And   sodium chloride 0 9 % inhalation solution 3 mL (3 mL Nebulization Given 5/1/22 2121)   predniSONE tablet 50 mg (50 mg Oral Given 5/1/22 2116)   furosemide (LASIX) injection 20 mg (20 mg Intravenous Given 5/1/22 2316)       Diagnostic Studies  Results Reviewed     Procedure Component Value Units Date/Time    HS Troponin I 4hr [[id number]]  (Normal) Collected: 05/02/22 0309    Lab Status: Final result Specimen: Blood from Arm, Right Updated: 05/02/22 0416     hs TnI 4hr 20 ng/L      Delta 4hr hsTnI -1 ng/L     HS Troponin I 2hr [[id number]]  (Normal) Collected: 05/01/22 2328    Lab Status: Final result Specimen: Blood from Arm, Right Updated: 05/02/22 0016     hs TnI 2hr 24 ng/L      Delta 2hr hsTnI 3 ng/L     HS Troponin 0hr (reflex protocol) [[id number]]  (Normal) Collected: 05/01/22 2116    Lab Status: Final result Specimen: Blood from Arm, Right Updated: 05/01/22 2227     hs TnI 0hr 21 ng/L     Basic metabolic panel [[id number]]  (Abnormal) Collected: 05/01/22 2116    Lab Status: Final result Specimen: Blood from Arm, Right Updated: 05/01/22 2149     Sodium 140 mmol/L      Potassium 4 3 mmol/L      Chloride 105 mmol/L      CO2 35 mmol/L      ANION GAP 0 mmol/L      BUN 22 mg/dL      Creatinine 1 12 mg/dL      Glucose 226 mg/dL      Calcium 8 5 mg/dL      eGFR 64 ml/min/1 73sq m     Narrative:      [place] guidelines for Chronic Kidney Disease (CKD):     Stage 1 with normal or high GFR (GFR > 90 mL/min/1 73 square meters)    Stage 2 Mild CKD (GFR = 60-89 mL/min/1 73 square meters)    Stage 3A Moderate CKD (GFR = 45-59 mL/min/1 73 square meters)    Stage 3B Moderate CKD (GFR = 30-44 mL/min/1 73 square meters)    Stage 4 Severe CKD (GFR = 15-29 mL/min/1 73 square meters)    Stage 5 End Stage CKD (GFR <15 mL/min/1 73 square meters)  Note: GFR calculation is accurate only with a steady state creatinine    NT-BNP PRO [[id number]]  (Abnormal) Collected: 05/01/22 2116    Lab Status: Final result Specimen: Blood from Arm, Right Updated: 05/01/22 2149     NT-proBNP 1,322 pg/mL     CBC and differential [[id number]]  (Abnormal) Collected: 05/01/22 2116    Lab Status: Final result Specimen: Blood from Arm, Right Updated: 05/01/22 2126     WBC 7 05 Thousand/uL      RBC 4 65 Million/uL      Hemoglobin 11 9 g/dL      Hematocrit 38 8 %      MCV 83 fL      MCH 25 6 pg      MCHC 30 7 g/dL      RDW 15 9 %      MPV 11 0 fL      Platelets 220 Thousands/uL      nRBC 0 /100 WBCs      Neutrophils Relative 78 %      Immat GRANS % 1 %      Lymphocytes Relative 14 %      Monocytes Relative 7 %      Eosinophils Relative 0 %      Basophils Relative 0 %      Neutrophils Absolute 5 46 Thousands/µL      Immature Grans Absolute 0 05 Thousand/uL      Lymphocytes Absolute 0 98 Thousands/µL      Monocytes Absolute 0 52 Thousand/µL      Eosinophils Absolute 0 03 Thousand/µL      Basophils Absolute 0 01 Thousands/µL                  CT chest wo contrast   Final Result by Kandace Mendez DO (05/02 1403)   Addendum 1 of 1 by Kandace Mendez DO (05/02 2453)   ADDENDUM:      Airspace opacities within the left upper lobe which may represent    infection  Recommend short-term follow-up chest CT scan in 3 months to    evaluate for resolution  Final      Airspace opacities within the left upper lobe which may represent  Recommend short-term follow-up chest CT scan in 3 months to evaluate for resolution  The study was marked in [facility] for immediate notification  Workstation performed: [id number]         XR chest 2 views   Final Result by Vishal Leggett DO (05/02 8573)      Stable chest       Increased density right base likely a combination of pleural effusion and/or atelectasis, unchanged  Workstation performed: [id number]               Procedures  Procedures      ED Course  ED Course as of 05/10/22 1215   Sun May 01, 2022   2109 Procedure Note: EKG  Date/Time: 05/01/22 8:51 PM   Interpreted by: Samreen Gordon MD  Indications / Diagnosis: CP, dyspnea  ECG reviewed by me, the ED Physician: yes   The EKG demonstrates:  Sinus rhythm with first degree AV block, rate 91 bpm  RBBB  Nonspecific ST-T wave changes  No STEMI  No significant change compared to ECG from 04/21/2022  MDM  Number of Diagnoses or Management Options  Chest pain  CHF (congestive heart failure) (HCC)  COPD with acute exacerbation ([address] )  Dyspnea  Diagnosis management comments: This is a 72-year-old male presenting the ER via EMS for evaluation of increased dyspnea and chest discomfort    Patient has been seen in the ER several times in the past and been admitted for similar symptoms  No improvement with home breathing treatment  On 2 L nasal cannula at baseline  Symptoms appear consistent with CHF and COPD exacerbation  Will treat symptomatically  Obtained labs, ECG, chest x-ray  Discussed findings indications for admission with the patient and he understands and agrees  Case discussed with [facility] IM for admission  Disposition  Final diagnoses:   Dyspnea   Chest pain   COPD with acute exacerbation ([facility] )   CHF (congestive heart failure) ([place] [address] )     Time reflects when diagnosis was documented in both MDM as applicable and the Disposition within this note     Time User Action Codes Description Comment    5/2/2022 12:25 AM Latrelle Kugel Add [R06 00] Dyspnea     5/2/2022 12:25 AM Latrelle Kugel Add [R07 9] Chest pain     5/2/2022 12:25 AM Latrelle Kugel Add [J44 1] COPD with acute exacerbation ([place] [address] )     5/2/2022 12:25 AM Latrelle Kugel Add [I50 9] CHF (congestive heart failure) ([place] [address] )     5/2/2022  1:58 AM Shania Héctor Add [R06 02] Shortness of breath     5/2/2022  4:20 AM Shania Héctor Add [J18 9] Pneumonia     5/3/2022  2:23 PM RebeccaMel irizarry Add [J44 9] Chronic obstructive pulmonary disease, unspecified COPD type ([place] [address] )     5/3/2022  4:02 PM Rebecca, Nitasa Add [J44 9] COPD, severe ([place] [address] )     5/3/2022  4:02 PM Rebecca, Nitasa Add [J44 1] COPD exacerbation ([place] [address] )     7/3/1776  2:16 PM Court Fails Add [E69 74] Chronic respiratory failure with hypoxia [facility])       ED Disposition     ED Disposition Condition Date/Time Comment    Admit Stable Mon May 2, 2022 12:25 AM Case was discussed with Dr Patsy Gayle, and the patient's admission status was agreed to be Admission Status: observation status to the service of Dr Patsy Gayle  Follow-up Information     Follow up With Specialties Details Why [address] [place] [address]  Follow up  [address] [place] [id number] [phone]    Jordon Begum MD Radiation Oncology   [place]   [address]   [phone]            Discharge Medication List as of 5/3/2022  5:06 PM      START taking these medications    Details   ipratropium-albuterol (DUO-NEB) 0 5-2 5 mg/3 mL nebulizer solution Take 3 mL by nebulization 3 (three) times a day, Starting Tue 5/3/2022, Normal         CONTINUE these medications which have CHANGED    Details   !! fluticasone-umeclidinium-vilanterol (Trelegy Ellipta) 100-62 5-25 MCG/INH inhaler Inhale 1 puff daily Rinse mouth after use , Starting Tue 5/3/2022, Normal      !! fluticasone-umeclidinium-vilanterol (Trelegy Ellipta) 100-62 5-25 MCG/INH inhaler Inhale 1 puff daily Rinse mouth after use , Starting Tue 5/3/2022, Until Thu 6/2/2022, Normal       !! - Potential duplicate medications found  Please discuss with provider  CONTINUE these medications which have NOT CHANGED    Details   ferrous sulfate 325 (65 Fe) mg tablet Take 1 tablet (325 mg total) by mouth daily with breakfast, Starting Wed 2/23/2022, Normal      furosemide (LASIX) 40 mg tablet Take 1 tablet (40 mg total) by mouth daily, Starting Sun 1/30/2022, Normal      potassium chloride (K-DUR,KLOR-CON) 20 mEq tablet Take 1 tablet (20 mEq total) by mouth daily, Starting Sun 1/30/2022, Normal      simvastatin (ZOCOR) 40 mg tablet TAKE ONE TABLET BY MOUTH EVERY DAY, Normal      metoprolol tartrate (LOPRESSOR) 25 mg tablet TAKE ONE TABLET BY MOUTH TWICE A DAY, Normal      BD Insulin Syringe U-500 31G X 6MM 0 5 ML MISC USE 1 SYRINGE THREE TIMES A DAY FOR INJECTING INSULIN, Normal      cyclobenzaprine (FLEXERIL) 10 mg tablet Take 1 tablet (10 mg total) by mouth 3 (three) times a day for 10 days, Starting Wed 2/23/2022, Until Sat 3/5/2022, Normal      glucose blood (OneTouch Verio) test strip May substitute brand based on insurance coverage   Check glucose QID , Normal      HUMULIN R 500 UNIT/ML CONCENTRATED injection INJECT 45 UNITS TWICE DAILY BEFORE BREAKFAST AND LUNCH, Normal STOP taking these medications       fluticasone-vilanterol (BREO ELLIPTA) 100-25 mcg/inh inhaler Comments:   Reason for Stopping:                 PDMP Review       Value Time User    PDMP Reviewed  Yes 5/5/2022 11:38 PM Deepa Larsen DO           ED Provider  Attending physically available and evaluated Ceci Iona Sr    I managed the patient along with the ED Attending      Electronically Signed by         Yolande Hodgkin, MD  05/10/22 1501

## 2022-05-02 NOTE — CONSULTS
PULMONOLOGY CONSULT NOTE     Name: Beatrice Duckworth    Age & Sex: 78 y o  male   MRN: [id number]  Unit/Bed#: [facility] 519-01   Encounter: [id number]        Reason for consultation: SOB     Requesting physician: Dr Sejal Jackson and Plan:    Chronic respiratory failure secondary to severe COPD   - FEV1/FVC 41%, FEV1 33%   - on 3lpm baseline, however, may not need oxygen at rest   - will need repeat desaturation test upon DC to determine O2 needs   - he is afebrile, no WBC, PCT 0 10  - sputum cx polymicrobial- f/u speciation- if nothing, can DC   - I have a feeling his technique and the inhalers he's on is not getting to his lungs given his poor lung function and chronic volume loss -  will switch his regimen to nebulizers for now   - start atrovent/xopenex TID   - Perforomist and Pulmicort nebs BID   - may repeat PFTs outpatient and benefit from pulmonary rehab   - incentive spirometry   - CT scan reviewed with chronic R sided volume loss with scarring and rounded atelectasis, chronic R sided effusion   - no indication for steroids currently   - follows with Dr Jossie Byers     Chronic diastolic heart failure  - does not appear fluid overloaded- c/w home lasix     Chronic R pleural effusion   - no indication for thoracentesis at this time- cannot rule out malignant fluid     Probable JACQUELINE   - as per notes, intolerant to PAP therapy, however, will discuss with patient retrialing with different mask   - last sleep study in 2017 with severe obstruction during REM - would benefit from repeat sleep study outpatient     Hx Stage IA adenocarcinoma s/p SBRT   - has some radiation changes     Tobacco Abuse   - continues to smoke 1 pack every 3 months   - counselled     Discussed with primary team and bedside RN       History of Present Illness   HPI:  Lakhaniivonne Adam Sr  is a 78 y o  male with PMHx lung adenocarcinoma, severe COPD, recurrent R pleural effusion, Chronic hypoxic respiratory failure on 2 lpm  He reports he does not move much at baseline and reports he was just in the hospital last week  He reports he feels his breathing gets worse when his blood sugars become elevated  He denies any weight gain, sputum production, wheezing  He did try to use his nebulizers at home when he felt more SOB, however, they didn't work  He does use trelegy at home but feels the medicine may not be getting into his lungs  He remains on his baseline oxygen, however, and has not increased at home  He does not have a pulse oximeter at home  He presented again with some chest pain last night after experiencing it around 7pm  He was given lasix, nebulizers and a dose of steroids with no improvement thus admitted  Today, he reports feeling a little bit better but does not want to keep coming in and out of the hospital       Review of systems:  12 point review of systems was completed and was otherwise negative except as listed in HPI  Historical Information   Past Medical History:   Diagnosis Date    Abdominal pain     [facility]2018    Cardiac disease     CH[facility]congestive [facility] failure) [facility]     COPD, severe (HCC)     Coronary artery disease     DDD (degenerative disc disease), lumbar 9/1/2020    Diabetes mellitus [facility] )     Dyspnea     GERD (gastroesophageal reflux disease)     Hyperlipidemia     Hypertension     MI [facility]myocardial infarction) ([facility] )     with 3 stents    Nodule of apex of right lung     JACQUELINE (obstructive sleep apnea)     Prostate cancer [facility])      Past Surgical History:   Procedure Laterality Date    ABDOMINAL SURGERY      exploratory    ANGIOPLASTY      3 stents    APPENDECTOMY      COLONOSCOPY  2015    CT NEEDLE BIOPSY LUNG  11/3/2020    ESOPHAGOGASTRODUODENOSCOPY N/A 10/2/2017    Procedure: ESOPHAGOGASTRODUODENOSCOPY (EGD); Surgeon: Floyd Zeng MD;  Location: [facility];   Service: Gastroenterology    IR THORACENTESIS  11/3/2020    KNEE CARTILAGE SURGERY      OTHER SURGICAL HISTORY      stent placement    PROSTATE SURGERY      SKIN GRAFT      Basal cell CA back     Family History   Problem Relation Age of Onset    Heart disease Father     Other Father         Mesothelioma        Occupational History: does not work     Social History:   Social History     Tobacco Use   Smoking Status Former Smoker    Packs/day: 1 50    Years: 50 00    Pack years: 75 00    Start date:     Quit date: 2020    Years since quittin 8   Smokeless Tobacco Never Used         Meds/Allergies   Current Facility-Administered Medications   Medication Dose Route Frequency    acetaminophen (TYLENOL) tablet 650 mg  650 mg Oral Q8H PRN    atorvastatin (LIPITOR) tablet 20 mg  20 mg Oral Daily With Dinner    azithromycin (ZITHROMAX) 500 mg in sodium chloride 0 9 % 250 mL IVPB  500 mg Intravenous Q24H    cefTRIAXone (ROCEPHIN) 1,000 mg in dextrose 5 % 50 mL IVPB  1,000 mg Intravenous Q24H    ferrous sulfate tablet 325 mg  325 mg Oral Daily With Breakfast    fluticasone-vilanterol (BREO ELLIPTA) 100-25 mcg/inh inhaler 1 puff  1 puff Inhalation Daily    furosemide (LASIX) tablet 40 mg  40 mg Oral Daily    heparin (porcine) subcutaneous injection 5,000 Units  5,000 Units Subcutaneous Q8H [address]    insulin lispro (HumaLOG) 100 units/mL subcutaneous injection 1-5 Units  1-5 Units Subcutaneous TID AC    insulin lispro (HumaLOG) 100 units/mL subcutaneous injection 1-5 Units  1-5 Units Subcutaneous 0200    insulin regular (HumuLIN R U-500) 500 units/mL CONCENTRATED injection 45 Units  45 Units Subcutaneous BID AC    ipratropium-albuterol (DUO-NEB) 0 5-2 5 mg/3 mL inhalation solution 3 mL  3 mL Nebulization Q6H PRN    levalbuterol (XOPENEX) inhalation solution 1 25 mg  1 25 mg Nebulization TID    metoprolol tartrate (LOPRESSOR) tablet 25 mg  25 mg Oral BID     Medications Prior to Admission   Medication    BD Insulin Syringe U-500 31G X 6MM 0 5 ML MISC    cyclobenzaprine (FLEXERIL) 10 mg tablet  ferrous sulfate 325 (65 Fe) mg tablet    fluticasone-vilanterol (BREO ELLIPTA) 100-25 mcg/inh inhaler    furosemide (LASIX) 40 mg tablet    glucose blood (OneTouch Verio) test strip    HUMULIN R 500 UNIT/ML CONCENTRATED injection    metoprolol tartrate (LOPRESSOR) 25 mg tablet    potassium chloride (K-DUR,KLOR-CON) 20 mEq tablet    simvastatin (ZOCOR) 40 mg tablet    Trelegy Ellipta 100-62 5-25 MCG/INH inhaler     Allergies   Allergen Reactions    Crestor [Rosuvastatin] Other (See Comments)     Unknown      Metformin GI Intolerance       Vitals: Blood pressure 163/86, pulse 71, temperature 98 1 °F (36 7 °C), temperature source Oral, resp  rate 20, height 6' (1 829 m), weight 98 2 kg (216 lb 9 6 oz), SpO2 92 %  ,  Body mass index is 29 38 kg/m²  Intake/Output Summary (Last 24 hours) at 5/2/2022 0804  Last data filed at 5/2/2022 0709  Gross per 24 hour   Intake 680 ml   Output 1575 ml   Net -895 ml       Physical Exam  Vitals reviewed  Constitutional:       Appearance: He is obese  HENT:      Head: Normocephalic  Mouth/Throat:      Mouth: Mucous membranes are moist    Eyes:      Pupils: Pupils are equal, round, and reactive to light  Cardiovascular:      Rate and Rhythm: Normal rate and regular rhythm  Pulses: Normal pulses  Heart sounds: Normal heart sounds  Pulmonary:      Effort: Pulmonary effort is normal       Breath sounds: Normal breath sounds  Abdominal:      Palpations: Abdomen is soft  Musculoskeletal:         General: Normal range of motion  Comments: Trace pitting edema b/l   Neurological:      General: No focal deficit present  Mental Status: He is alert and oriented to person, place, and time  Labs: I have personally reviewed pertinent lab results    Laboratory and Diagnostics  Results from last 7 days   Lab Units 05/02/22  0309 05/01/22  2116   WBC Thousand/uL 9 27 7 05   HEMOGLOBIN g/dL 12 2 11 9*   HEMATOCRIT % 40 9 38 8   PLATELETS Thousands/uL 140* 140*   NEUTROS PCT %  --  78*   BANDS PCT % 4  --    MONOS PCT %  --  7   MONO PCT % 0*  --      Results from last 7 days   Lab Units 22  0309 22  2116   SODIUM mmol/L 136 140   POTASSIUM mmol/L 4 4 4 3   CHLORIDE mmol/L 101 105   CO2 mmol/L 31 35*   ANION GAP mmol/L 4 0*   BUN mg/dL 20 22   CREATININE mg/dL 1 21 1 12   CALCIUM mg/dL 8 5 8 5   GLUCOSE RANDOM mg/dL 286* 226*   ALT U/L 22  --    AST U/L 16  --    ALK PHOS U/L 90  --    ALBUMIN g/dL 3 0*  --    TOTAL BILIRUBIN mg/dL 0 21  --           Results from last 7 days   Lab Units 22  0309   INR  0 90   PTT seconds 26                              Results from last 7 days   Lab Units 22  0454   PROCALCITONIN ng/ml 0 10         Imaging and other studies: I have personally reviewed pertinent reports  and I have personally reviewed pertinent films in PACS  CT chest wo contrast    Addendum Date: 2022    ADDENDUM: Airspace opacities within the left upper lobe which may represent infection  Recommend short-term follow-up chest CT scan in 3 months to evaluate for resolution  Result Date: 2022  Impression: Airspace opacities within the left upper lobe which may represent  Recommend short-term follow-up chest CT scan in 3 months to evaluate for resolution  The study was marked in [facility] for immediate notification  Workstation performed: [id number]         Pulmonary function testin: Results:  FEV1/FVC Ratio: 41 %  Forced Vital Capacity: 2 72 L    61 % predicted  FEV1: 1 11 L     33 % predicted     Lung volumes by body plethysmography:   Total Lung Capacity 89 % predicted   Residual volume 144 % predicted     DLCO corrected for patients hemoglobin level: 36 %    EKG, Pathology, and Other Studies: I have personally reviewed pertinent reports     and I have personally reviewed pertinent films in PACS      Code Status: Level 1 - Full Code    VTE Pharmacologic Prophylaxis: Heparin  VTE Mechanical Prophylaxis: sequential compression device      Mariusz Meng MD  Pulmonary/Critical Care Fellow  [facility]

## 2022-05-02 NOTE — ASSESSMENT & PLAN NOTE
-SOB beginning earlier in the day as well as chest tightness   -No relief with breathing treatment at home  -Hx Chronic Respiratory Failure on baseline 2-3L NC in setting of prior Lung cancer surgery, COPD, CHF  -Hx Lung Cancer Stage IA2 per chart review s/p SBRT to RUL 12/31/2020  -On pta breo inhaler, lasix  -Recently hospitalized and d/c'd on 4/24 for similar symptoms; Treated with Oral steroids, home diuretics and nebulizers with reported improvement  -BNP elevated at 1322  -CXR with evidence or R sided effusion  -EKG: Sinus, rate 91, 1st degree AV block, RBBB, nonspecific st changes  Similar to previous EKG  -Troponin negative x 2  -Given Oral Prednisone, IV lasix and duonebs in the ER  -No wheezing on exam, decreased breath sounds on the R  -CT Chest without contrast in ER: Airspace opacities within the left upper lobe which may represent infection  (spoke with radiologist regarding this to clarify)  Recommend short-term follow-up chest CT scan in 3 months to evaluate for resolution  Plan:  >Admit to medicine on telemetry  Treat for Non-Severe CAP with Ceftriaxone and Azithromycin  Order blood cultures and sputum cx/gram stain  Will consult Pulmonology for their opinion given patient's numerous pulmonary issues     >Continue pta inhalers  >Continue pta lasix  >Daily labs  >Pulmonary hygiene

## 2022-05-03 VITALS
RESPIRATION RATE: 18 BRPM | HEIGHT: 72 IN | OXYGEN SATURATION: 94 % | DIASTOLIC BLOOD PRESSURE: 71 MMHG | SYSTOLIC BLOOD PRESSURE: 146 MMHG | BODY MASS INDEX: 29.77 KG/M2 | HEART RATE: 71 BPM | TEMPERATURE: 98.3 F | WEIGHT: 219.8 LBS

## 2022-05-03 LAB
ATRIAL RATE: 91 BPM
GLUCOSE SERPL-MCNC: 180 MG/DL (ref 65–140)
GLUCOSE SERPL-MCNC: 211 MG/DL (ref 65–140)
GLUCOSE SERPL-MCNC: 300 MG/DL (ref 65–140)
QRS AXIS: 152 DEGREES
QRSD INTERVAL: 130 MS
QT INTERVAL: 380 MS
QTC INTERVAL: 467 MS
T WAVE AXIS: 55 DEGREES
VENTRICULAR RATE: 91 BPM

## 2022-05-03 PROCEDURE — 82948 REAGENT STRIP/BLOOD GLUCOSE: CPT

## 2022-05-03 PROCEDURE — 93010 ELECTROCARDIOGRAM REPORT: CPT | Performed by: INTERNAL MEDICINE

## 2022-05-03 PROCEDURE — 99232 SBSQ HOSP IP/OBS MODERATE 35: CPT | Performed by: INTERNAL MEDICINE

## 2022-05-03 PROCEDURE — 94760 N-INVAS EAR/PLS OXIMETRY 1: CPT

## 2022-05-03 PROCEDURE — 99239 HOSP IP/OBS DSCHRG MGMT >30: CPT | Performed by: INTERNAL MEDICINE

## 2022-05-03 PROCEDURE — 94640 AIRWAY INHALATION TREATMENT: CPT

## 2022-05-03 RX ORDER — LANOLIN ALCOHOL/MO/W.PET/CERES
3 CREAM (GRAM) TOPICAL
Status: DISCONTINUED | OUTPATIENT
Start: 2022-05-03 | End: 2022-05-03 | Stop reason: HOSPADM

## 2022-05-03 RX ORDER — IPRATROPIUM BROMIDE AND ALBUTEROL SULFATE 2.5; .5 MG/3ML; MG/3ML
3 SOLUTION RESPIRATORY (INHALATION) 3 TIMES DAILY
Qty: 3 ML | Refills: 0 | Status: ON HOLD | OUTPATIENT
Start: 2022-05-03 | End: 2022-06-19 | Stop reason: SDUPTHER

## 2022-05-03 RX ORDER — FLUTICASONE FUROATE, UMECLIDINIUM BROMIDE AND VILANTEROL TRIFENATATE 100; 62.5; 25 UG/1; UG/1; UG/1
1 POWDER RESPIRATORY (INHALATION) DAILY
Qty: 60 EACH | Refills: 3 | Status: SHIPPED | OUTPATIENT
Start: 2022-05-03 | End: 2022-05-17

## 2022-05-03 RX ORDER — FLUTICASONE FUROATE AND VILANTEROL 100; 25 UG/1; UG/1
1 POWDER RESPIRATORY (INHALATION) DAILY
Status: DISCONTINUED | OUTPATIENT
Start: 2022-05-03 | End: 2022-05-03 | Stop reason: HOSPADM

## 2022-05-03 RX ORDER — FLUTICASONE FUROATE, UMECLIDINIUM BROMIDE AND VILANTEROL TRIFENATATE 100; 62.5; 25 UG/1; UG/1; UG/1
1 POWDER RESPIRATORY (INHALATION) DAILY
Qty: 60 BLISTER | Refills: 0 | Status: SHIPPED | OUTPATIENT
Start: 2022-05-03 | End: 2022-05-17 | Stop reason: SDUPTHER

## 2022-05-03 RX ADMIN — AZITHROMYCIN MONOHYDRATE 500 MG: 500 INJECTION, POWDER, LYOPHILIZED, FOR SOLUTION INTRAVENOUS at 03:39

## 2022-05-03 RX ADMIN — BUDESONIDE 0.5 MG: 0.5 INHALANT ORAL at 08:29

## 2022-05-03 RX ADMIN — FUROSEMIDE 40 MG: 40 TABLET ORAL at 09:03

## 2022-05-03 RX ADMIN — CEFTRIAXONE SODIUM 1000 MG: 10 INJECTION, POWDER, FOR SOLUTION INTRAVENOUS at 05:23

## 2022-05-03 RX ADMIN — INSULIN LISPRO 1 UNITS: 100 INJECTION, SOLUTION INTRAVENOUS; SUBCUTANEOUS at 07:18

## 2022-05-03 RX ADMIN — IPRATROPIUM BROMIDE 0.5 MG: 0.5 SOLUTION RESPIRATORY (INHALATION) at 08:29

## 2022-05-03 RX ADMIN — LEVALBUTEROL HYDROCHLORIDE 1.25 MG: 1.25 SOLUTION RESPIRATORY (INHALATION) at 08:29

## 2022-05-03 RX ADMIN — HEPARIN SODIUM 5000 UNITS: 5000 INJECTION INTRAVENOUS; SUBCUTANEOUS at 05:01

## 2022-05-03 RX ADMIN — METOPROLOL TARTRATE 25 MG: 25 TABLET, FILM COATED ORAL at 09:04

## 2022-05-03 RX ADMIN — LEVALBUTEROL HYDROCHLORIDE 1.25 MG: 1.25 SOLUTION RESPIRATORY (INHALATION) at 14:01

## 2022-05-03 RX ADMIN — FLUTICASONE FUROATE AND VILANTEROL TRIFENATATE 1 PUFF: 100; 25 POWDER RESPIRATORY (INHALATION) at 12:10

## 2022-05-03 RX ADMIN — FERROUS SULFATE TAB 325 MG (65 MG ELEMENTAL FE) 325 MG: 325 (65 FE) TAB at 07:17

## 2022-05-03 RX ADMIN — IPRATROPIUM BROMIDE 0.5 MG: 0.5 SOLUTION RESPIRATORY (INHALATION) at 14:01

## 2022-05-03 RX ADMIN — INSULIN LISPRO 3 UNITS: 100 INJECTION, SOLUTION INTRAVENOUS; SUBCUTANEOUS at 12:10

## 2022-05-03 RX ADMIN — ACETAMINOPHEN 650 MG: 325 TABLET, FILM COATED ORAL at 15:17

## 2022-05-03 RX ADMIN — FORMOTEROL FUMARATE DIHYDRATE 20 MCG: 20 SOLUTION RESPIRATORY (INHALATION) at 08:29

## 2022-05-03 RX ADMIN — ACETAMINOPHEN 650 MG: 325 TABLET, FILM COATED ORAL at 07:16

## 2022-05-03 RX ADMIN — INSULIN HUMAN 45 UNITS: 500 INJECTION, SOLUTION SUBCUTANEOUS at 07:17

## 2022-05-03 RX ADMIN — HEPARIN SODIUM 5000 UNITS: 5000 INJECTION INTRAVENOUS; SUBCUTANEOUS at 15:17

## 2022-05-03 NOTE — ED ATTENDING ATTESTATION
5/1/2022  I, Juana Pfeiffer MD, saw and evaluated the patient  I have discussed the patient with the resident/non-physician practitioner and agree with the resident's/non-physician practitioner's findings, Plan of Care, and MDM as documented in the resident's/non-physician practitioner's note, except where noted  All available labs and Radiology studies were reviewed  I was present for key portions of any procedure(s) performed by the resident/non-physician practitioner and I was immediately available to provide assistance  At this point I agree with the current assessment done in the Emergency Department  I have conducted an independent evaluation of this patient a history and physical is as follows:    ED Course     68-year-old male with history of CHF COPD presenting with shortness of breath onset symptoms was 45 minutes prior to arrival associated symptoms include chest pain and tightness  Cough is dry nonproductive  Vital signs reviewed  Heart regular rate rhythm without murmurs  Lungs bilateral wheezes per lung fields abdomen soft nontender nondistended normal bowel sounds extremities bilateral edema    Impression:  Dyspnea plan check cardiac evaluation chest x-ray in trial bronchodilators steroids diuretics reassess    Lab studies reviewed elevated BNP chest x-ray shows right-sided effusion appears unchanged from prior  Patient admitted to Medicine Service for further evaluation management         Critical Care Time  Procedures

## 2022-05-03 NOTE — ASSESSMENT & PLAN NOTE
· -On baseline 2-3 at rest  · - multifactorial, Hx Lung Cancer, COPD, CHF  · Complicated by continued cigarette use  · Plan:  · As above in SOB section

## 2022-05-03 NOTE — RESTORATIVE TECHNICIAN NOTE
Restorative Technician Note      Patient Name: Delbert Renee       Note Type: Mobility (Pt defering ambulation at this time will attempt to see in afternoon)  Patient Position Upon Consult: Supine

## 2022-05-03 NOTE — PROGRESS NOTES
Progress Note - Pulmonary   Maurice Wilmer Sr  78 y o  male MRN: [id number]  Unit/Bed#: [facility] 519-01 Encounter: [id number]      Assessment and Plan:     Chronic respiratory failure secondary to severe COPD   - FEV1/FVC 41%, FEV1 33%   - on 3lpm baseline, however, may not need oxygen at rest   - will need repeat desaturation test upon DC to determine O2 needs   - he is afebrile, no WBC, PCT 0 10  - sputum cx polymicrobial- f/u speciation- if nothing, can DC   - he tried nebulizers over 24 hours, however, feels like it makes him cough more   - c/w atrovent/xopenex TID  - may repeat PFTs outpatient and benefit from pulmonary rehab (did complete pulmonary rehab last year)   - incentive spirometry   - CT scan reviewed with chronic R sided volume loss with scarring and rounded atelectasis, chronic R sided effusion   - no indication for steroids currently   - follows with Dr Danette Ryan   - he will obtain his Trelegy next week once he receives his check   - will set up for [facility] COPD program and see if we can get him Trelegy for home      Chronic diastolic heart failure  - does not appear fluid overloaded- c/w home lasix      Chronic R pleural effusion   - no indication for thoracentesis at this time- cannot rule out malignant fluid      Probable JACQUELINE   - as per notes, intolerant to PAP therapy, however, will discuss with patient retrialing with different mask   - last sleep study in 2017 with severe obstruction during REM - would benefit from repeat sleep study outpatient      Hx Stage IA adenocarcinoma s/p SBRT   - has some radiation changes      Tobacco Abuse   - continues to smoke 1 pack every 3 months   - counselled      Tigertexted primary team, CM, and RN  He can be discharged from a pulmonary standpoint  Subjective:   No new complaints  Does not like the nebulizers  Willing to do the walk test today, remains off oxygen during the day      Review of Systems   All other systems reviewed and are negative        Objective: Vitals:    22 2157 22 0225 22 0449 22 0734   BP: (!) 124/47 143/74  151/80   BP Location:       Pulse: 67 67  76   Resp: 20 20     Temp: 98 5 °F (36 9 °C) 98 2 °F (36 8 °C)  97 7 °F (36 5 °C)   TempSrc:       SpO2: 90% 93%  93%   Weight:   99 7 kg (219 lb 12 8 oz)    Height:               Intake/Output Summary (Last 24 hours) at 5/3/2022 7528  Last data filed at 5/3/2022 0720  Gross per 24 hour   Intake 1210 ml   Output 2325 ml   Net -1115 ml       Social History     Tobacco Use   Smoking Status Former Smoker    Packs/day: 1 50    Years: 50 00    Pack years: 75 00    Start date:     Quit date: 2020    Years since quittin 8   Smokeless Tobacco Never Used       Physical Exam  Constitutional:       Appearance: Normal appearance  He is obese  HENT:      Head: Normocephalic  Mouth/Throat:      Mouth: Mucous membranes are moist    Eyes:      Pupils: Pupils are equal, round, and reactive to light  Cardiovascular:      Rate and Rhythm: Normal rate and regular rhythm  Pulses: Normal pulses  Heart sounds: Normal heart sounds  Pulmonary:      Effort: Pulmonary effort is normal       Breath sounds: Normal breath sounds  Abdominal:      General: Abdomen is flat  Palpations: Abdomen is soft  Musculoskeletal:         General: Normal range of motion  Skin:     General: Skin is warm and dry  Neurological:      General: No focal deficit present  Mental Status: He is alert and oriented to person, place, and time  Labs: I have personally reviewed pertinent lab results    Results from last 7 days   Lab Units 22  03022   WBC Thousand/uL 9 27 7 05   HEMOGLOBIN g/dL 12 2 11 9*   HEMATOCRIT % 40 9 38 8   PLATELETS Thousands/uL 140* 140*   NEUTROS PCT %  --  78*   MONOS PCT %  --  7   MONO PCT % 0*  --       Results from last 7 days   Lab Units 22  030226   POTASSIUM mmol/L 4 4 4 3   CHLORIDE mmol/L 101 105 CO2 mmol/L 31 35*   BUN mg/dL 20 22   CREATININE mg/dL 1 21 1 12   CALCIUM mg/dL 8 5 8 5   ALK PHOS U/L 90  --    ALT U/L 22  --    AST U/L 16  --               Results from last 7 days   Lab Units 05/02/22  0309   INR  0 90   PTT seconds 26         0   Lab Value Date/Time    TROPONINI <0 02 07/24/2021 0443    TROPONINI <0 02 07/24/2021 0019    TROPONINI <0 02 07/23/2021 2020    TROPONINI <0 02 04/14/2021 1541    TROPONINI <0 02 07/27/2020 2043    TROPONINI <0 02 06/14/2019 2119    TROPONINI <0 02 06/14/2019 1804    TROPONINI <0 02 06/14/2019 1325    TROPONINI <0 02 06/19/2018 0750    TROPONINI <0 02 06/19/2018 0445    TROPONINI 0 02 05/24/2018 0500    TROPONINI 0 03 05/24/2018 0045    TROPONINI 0 06 (H) 05/23/2018 1252    TROPONINI <0 02 04/27/2018 1522    TROPONINI 0 20 (H) 02/02/2018 0239    TROPONINI 0 25 (H) 02/01/2018 2332    TROPONINI 0 27 (H) 02/01/2018 2043    TROPONINI 0 12 (H) 02/01/2018 1400    TROPONINI <0 02 11/01/2017 0440    TROPONINI <0 02 11/01/2017 0108    TROPONINI 0 10 (H) 09/28/2017 2005    TROPONINI 0 10 (H) 09/28/2017 1730    TROPONINI <0 02 11/13/2016 0014    TROPONINI <0 02 11/12/2016 2018    TROPONINI <0 02 01/21/2016 0543    TROPONINI <0 02 01/21/2016 0129    TROPONINI <0 02 01/20/2016 1217    TROPONINI <0 02 01/20/2016 0347    TROPONINI <0 02 01/20/2016 0141    TROPONINI <0 02 09/09/2015 0530    TROPONINI <0 02 09/09/2015 0041    TROPONINI 0 04 03/23/2015 1443    TROPONINI <0 04 03/23/2015 0629    TROPONINI <0 04 03/20/2014 0614    TROPONINI <0 04 03/20/2014 0149       Microbiology:  As above     Imaging and other studies: I have personally reviewed pertinent reports  and I have personally reviewed pertinent films in PACS  XR chest 2 views    Result Date: 5/2/2022  Impression: Stable chest  Increased density right base likely a combination of pleural effusion and/or atelectasis, unchanged   Workstation performed: [id number]     CT chest wo contrast    Addendum Date: 5/2/2022 ADDENDUM: Airspace opacities within the left upper lobe which may represent infection  Recommend short-term follow-up chest CT scan in 3 months to evaluate for resolution  Result Date: 5/2/2022  Impression: Airspace opacities within the left upper lobe which may represent  Recommend short-term follow-up chest CT scan in 3 months to evaluate for resolution  The study was marked in [facility] for immediate notification   Workstation performed: [id number]           Bryan Villavicencio MD   Pulmonary & Critical Care Fellow  [facility]

## 2022-05-03 NOTE — ASSESSMENT & PLAN NOTE
· -Chest heaviness  · -Troponin negative, EKG without ischemic changes  · -Previously seen several days ago at [facility] with similar chest pain and felt to not likely be cardiac in origin  · Plan:  · Will DC antibiotics, procalcitonin negative and patient is afebrile with no leukocytosis; low of suspicion for pneumonia  · Is atypical chest pain is likely related to his underlying COPD

## 2022-05-03 NOTE — PLAN OF CARE
Problem: MOBILITY - ADULT  Goal: Maintain or return to baseline ADL function  Description: INTERVENTIONS:  -  Assess patient's ability to carry out ADLs; assess patient's baseline for ADL function and identify physical deficits which impact ability to perform ADLs (bathing, care of mouth/teeth, toileting, grooming, dressing, etc )  - Assess/evaluate cause of self-care deficits   - Assess range of motion  - Assess patient's mobility; develop plan if impaired  - Assess patient's need for assistive devices and provide as appropriate  - Encourage maximum independence but intervene and supervise when necessary  - Involve family in performance of ADLs  - Assess for home care needs following discharge   - Consider OT consult to assist with ADL evaluation and planning for discharge  - Provide patient education as appropriate  Outcome: Progressing  Goal: Maintains/Returns to pre admission functional level  Description: INTERVENTIONS:  - Perform BMAT or MOVE assessment daily    - Set and communicate daily mobility goal to care team and patient/family/caregiver  - Collaborate with rehabilitation services on mobility goals if consulted  - Perform Range of Motion 2 times a day  - Reposition patient every 2 hours    - Dangle patient 2 times a day  - Stand patient 2 times a day  - Ambulate patient 2 times a day  - Out of bed to chair 2 times a day   - Out of bed for meals 2 times a day  - Out of bed for toileting  - Record patient progress and toleration of activity level   Outcome: Progressing     Problem: Potential for Falls  Goal: Patient will remain free of falls  Description: INTERVENTIONS:  - Educate patient/family on patient safety including physical limitations  - Instruct patient to call for assistance with activity   - Consult OT/PT to assist with strengthening/mobility   - Keep Call bell within reach  - Keep bed low and locked with side rails adjusted as appropriate  - Keep care items and personal belongings within reach  - Initiate and maintain comfort rounds  - Make Fall Risk Sign visible to staff  - Apply yellow socks and bracelet for high fall risk patients  - Consider moving patient to room near nurses station  Outcome: Progressing     Problem: CARDIOVASCULAR - ADULT  Goal: Maintains optimal cardiac output and hemodynamic stability  Description: INTERVENTIONS:  - Monitor I/O, vital signs and rhythm  - Monitor for S/S and trends of decreased cardiac output  - Administer and titrate ordered vasoactive medications to optimize hemodynamic stability  - Assess quality of pulses, skin color and temperature  - Assess for signs of decreased coronary artery perfusion  - Instruct patient to report change in severity of symptoms  Outcome: Progressing     Problem: RESPIRATORY - ADULT  Goal: Achieves optimal ventilation and oxygenation  Description: INTERVENTIONS:  - Assess for changes in respiratory status  - Assess for changes in mentation and behavior  - Position to facilitate oxygenation and minimize respiratory effort  - Oxygen administered by appropriate delivery if ordered  - Initiate smoking cessation education as indicated  - Encourage broncho-pulmonary hygiene including cough, deep breathe, Incentive Spirometry  - Assess the need for suctioning and aspirate as needed  - Assess and instruct to report SOB or any respiratory difficulty  - Respiratory Therapy support as indicated  Outcome: Progressing     Problem: METABOLIC, FLUID AND ELECTROLYTES - ADULT  Goal: Electrolytes maintained within normal limits  Description: INTERVENTIONS:  - Monitor labs and assess patient for signs and symptoms of electrolyte imbalances  - Administer electrolyte replacement as ordered  - Monitor response to electrolyte replacements, including repeat lab results as appropriate  - Instruct patient on fluid and nutrition as appropriate  Outcome: Progressing  Goal: Fluid balance maintained  Description: INTERVENTIONS:  - Monitor labs   - Monitor I/O and WT  - Instruct patient on fluid and nutrition as appropriate  - Assess for signs & symptoms of volume excess or deficit  Outcome: Progressing  Goal: Glucose maintained within target range  Description: INTERVENTIONS:  - Monitor Blood Glucose as ordered  - Assess for signs and symptoms of hyperglycemia and hypoglycemia  - Administer ordered medications to maintain glucose within target range  - Assess nutritional intake and initiate nutrition service referral as needed  Outcome: Progressing     Problem: PAIN - ADULT  Goal: Verbalizes/displays adequate comfort level or baseline comfort level  Description: Interventions:  - Encourage patient to monitor pain and request assistance  - Assess pain using appropriate pain scale  - Administer analgesics based on type and severity of pain and evaluate response  - Implement non-pharmacological measures as appropriate and evaluate response  - Consider cultural and social influences on pain and pain management  - Notify physician/advanced practitioner if interventions unsuccessful or patient reports new pain  Outcome: Progressing     Problem: INFECTION - ADULT  Goal: Absence or prevention of progression during hospitalization  Description: INTERVENTIONS:  - Assess and monitor for signs and symptoms of infection  - Monitor lab/diagnostic results  - Monitor all insertion sites, i e  indwelling lines, tubes, and drains  - Monitor endotracheal if appropriate and nasal secretions for changes in amount and color  - [place] appropriate cooling/warming therapies per order  - Administer medications as ordered  - Instruct and encourage patient and family to use good hand hygiene technique  - Identify and instruct in appropriate isolation precautions for identified infection/condition  Outcome: Progressing  Goal: Absence of fever/infection during neutropenic period  Description: INTERVENTIONS:  - Monitor WBC    Outcome: Progressing     Problem: SAFETY ADULT  Goal: Maintain or return to baseline ADL function  Description: INTERVENTIONS:  -  Assess patient's ability to carry out ADLs; assess patient's baseline for ADL function and identify physical deficits which impact ability to perform ADLs (bathing, care of mouth/teeth, toileting, grooming, dressing, etc )  - Assess/evaluate cause of self-care deficits   - Assess range of motion  - Assess patient's mobility; develop plan if impaired  - Assess patient's need for assistive devices and provide as appropriate  - Encourage maximum independence but intervene and supervise when necessary  - Involve family in performance of ADLs  - Assess for home care needs following discharge   - Consider OT consult to assist with ADL evaluation and planning for discharge  - Provide patient education as appropriate  Outcome: Progressing  Goal: Maintains/Returns to pre admission functional level  Description: INTERVENTIONS:  - Perform BMAT or MOVE assessment daily    - Set and communicate daily mobility goal to care team and patient/family/caregiver  - Collaborate with rehabilitation services on mobility goals if consulted  - Perform Range of Motion 2 times a day  - Reposition patient every 2 hours    - Dangle patient 2 times a day  - Stand patient 2 times a day  - Ambulate patient 2 times a day  - Out of bed to chair 2 times a day   - Out of bed for meals 2 times a day  - Out of bed for toileting  - Record patient progress and toleration of activity level   Outcome: Progressing  Goal: Patient will remain free of falls  Description: INTERVENTIONS:  - Educate patient/family on patient safety including physical limitations  - Instruct patient to call for assistance with activity   - Consult OT/PT to assist with strengthening/mobility   - Keep Call bell within reach  - Keep bed low and locked with side rails adjusted as appropriate  - Keep care items and personal belongings within reach  - Initiate and maintain comfort rounds  - Make Fall Risk Sign visible to staff  - Apply yellow socks and bracelet for high fall risk patients  - Consider moving patient to room near nurses station  Outcome: Progressing     Problem: DISCHARGE PLANNING  Goal: Discharge to home or other facility with appropriate resources  Description: INTERVENTIONS:  - Identify barriers to discharge w/patient and caregiver  - Arrange for needed discharge resources and transportation as appropriate  - Identify discharge learning needs (meds, wound care, etc )  - Arrange for interpretive services to assist at discharge as needed  - Refer to Case Management Department for coordinating discharge planning if the patient needs post-hospital services based on physician/advanced practitioner order or complex needs related to functional status, cognitive ability, or social support system  Outcome: Progressing     Problem: Knowledge Deficit  Goal: Patient/family/caregiver demonstrates understanding of disease process, treatment plan, medications, and discharge instructions  Description: Complete learning assessment and assess knowledge base    Interventions:  - Provide teaching at level of understanding  - Provide teaching via preferred learning methods  Outcome: Progressing     Problem: Prexisting or High Potential for Compromised Skin Integrity  Goal: Skin integrity is maintained or improved  Description: INTERVENTIONS:  - Identify patients at risk for skin breakdown  - Assess and monitor skin integrity  - Assess and monitor nutrition and hydration status  - Monitor labs   - Assess for incontinence   - Turn and reposition patient  - Assist with mobility/ambulation  - Relieve pressure over bony prominences  - Avoid friction and shearing  - Provide appropriate hygiene as needed including keeping skin clean and dry  - Evaluate need for skin moisturizer/barrier cream  - Collaborate with interdisciplinary team   - Patient/family teaching  - Consider wound care consult   Outcome: Progressing     Problem: CARDIOVASCULAR - ADULT  Goal: Absence of cardiac dysrhythmias or at baseline rhythm  Description: INTERVENTIONS:  - Continuous cardiac monitoring, vital signs, obtain 12 lead EKG if ordered  - Administer antiarrhythmic and heart rate control medications as ordered  - Monitor electrolytes and administer replacement therapy as ordered  Outcome: Not Progressing

## 2022-05-03 NOTE — CASE MANAGEMENT
Case Management Discharge Planning Note    Patient name Juan M Joyce   [facility] MRN [id number]  : 1945 Date 5/3/2022       Current Admission Date: 2022  Current Admission Diagnosis:Shortness of breath   Patient Active Problem List    Diagnosis Date Noted    Shortness of breath 2022    Atypical chest pain 2022    CKD (chronic kidney disease) stage 3, GFR 30-59 ml/min ([facility] [address] ) 2022    History of prostate cancer 2022    UTI (urinary tract infection) 2022    [facility]    Fracture of navicular bone of left foot 2021    Chronic respiratory failure with hypoxia ([facility] [address] ) 04/15/2021    Obesity, morbid ([facility] ) 2021    PAD (peripheral artery disease) ([facility] 2021    History of lung cancer 2020    DDD (degenerative disc disease), lumbar 2020    Anemia, iron deficiency 2020    Lung nodule 2020    Pulmonary hypertension ([facility] [address] ) 2019    JACQUELINE (obstructive sleep apnea) 2019    COPD with acute exacerbation ([facility] [address] ) 2019    Oxygen dependent 2018    RBBB 2018    CAD (coronary artery disease) 2018    Chronic diastolic heart failure ([facility] [address] ) 2018    Pleural effusion on right 10/31/2017    COPD, severe ([address] ) 2017    Diabetic neuropathy [facility] 2017    Essential hypertension 2016    Venous stasis dermatitis of both lower extremities 11/15/2016    Type 2 diabetes mellitus with hyperglycemia, with long-term current use of insulin ([facility] [address] ) 2016    COPD exacerbation [facility] 2016    Obesity (BMI 30-39 9) 2016    HLD (hyperlipidemia) 2016      LOS (days): 0  Geometric Mean LOS (GMLOS) (days):   Days to GMLOS:     OBJECTIVE:            Current admission status: Observation   Preferred Pharmacy:   [facility], [address] [place] [id number]  Phone: [phone] Fax: [phone]    Primary Care Provider: Abdiaziz Bangura MD    Primary Insurance: [id number]  Secondary Insurance:     DISCHARGE DETAILS:    Discharge planning discussed with[de-identified] patient  Freedom of Choice: Yes  Comments - Freedom of Choice: patient has no preference of [address] agency     Were Treatment Team discharge recommendations reviewed with patient/caregiver?: Yes  Did patient/caregiver verbalize understanding of patient care needs?: Yes  Were patient/caregiver advised of the risks associated with not following Treatment Team discharge recommendations?: Yes         [address] [place] [address]         Is the patient interested in [address] at discharge?: Yes  [address] requested[de-identified] Rene Galeana Name[de-identified] [facility] Provider[de-identified] PCP  Home Health Services Needed[de-identified] COPD Management,Gait/ADL Training,Heart Failure Management,Strengthening/Theraputic Exercises to Improve Function  Homebound Criteria Met[de-identified] Uses an Assist Device (i e  cane, walker, etc),Requires the Assistance of Another Person for Safe Ambulation or to Leave the Home  Supporting Clincal Findings[de-identified] Limited Endurance,Fatigues Easliy in Short Distances,Requires Oxygen         Other Referral/Resources/Interventions Provided:  Programs[de-identified] COPD,CHF         [facility]  Discharge Destination Plan[de-identified] Home with [place] at Discharge : Other (Comment) (friend Jude Aguilar)       Additional Comments: Patient states he is inn agreement to [address] [facility]  A referral wqas sent to [facility] via [facility] and awaiting determination  Patient states he has a standard cane, rollator, 2 electric wheelchairs and oxygen at home            4:13pm   [facility] said the presciption for Trelegy will cost $9 87 each month  Patient said he can afford the cost of the medication  Dr Dary Pat said the patient can be DC    Patient states his keys to his house are in the house and his son Dougie Lund [phone] is on his way to work  Dougie Lund was contacted and said he can take patient home this evening and will come back to the hospital   Patient said he wants the Trelegy sent to [facility] so Pulmonary was made aware and said they would send the script  [facility] was made aware of patient request to change pharmacies    No further CM needs anticipated

## 2022-05-03 NOTE — ASSESSMENT & PLAN NOTE
· Acute on chronic respiratory failure with hypoxia secondary to COPD exacerbation  · -No relief with breathing treatment at home  · -Hx Chronic Respiratory Failure on baseline 2-3L NC in setting of prior Lung cancer surgery, COPD, CHF  · -Hx Lung Cancer Stage IA2 per chart review s/p SBRT to RUL 12/31/2020  · -On pta breo inhaler, lasix  · -Recently hospitalized and d/c'd on 4/24 for similar symptoms; Treated with Oral steroids, home diuretics and nebulizers with reported improvement  · -BNP elevated at 1322  · -CXR with evidence or R sided effusion  · -EKG: Sinus, rate 91, 1st degree AV block, RBBB, nonspecific st changes  Similar to previous EKG  · -Troponin negative x 2  · -Given Oral Prednisone, IV lasix and duonebs in the ER  · -No wheezing on exam, decreased breath sounds on the R  · -CT Chest without contrast in ER: Airspace opacities within the left upper lobe which may represent infection  (spoke with radiologist regarding this to clarify)  Recommend short-term follow-up chest CT scan in 3 months to evaluate for resolution  · Patient was able to be titrated to room air at rest, and refused to do ambulatory pulse ox to test for the oxygen    · Plan  · Pulmonary following  · Continue with recommendations per Pulmonary  · Trelegy sent to pharmacy, patient agreed and can afford it; discussed with case management  · DC home today

## 2022-05-03 NOTE — ASSESSMENT & PLAN NOTE
Wt Readings from Last 3 Encounters:   05/03/22 99 7 kg (219 lb 12 8 oz)   04/24/22 99 7 kg (219 lb 12 8 oz)   02/22/22 100 kg (220 lb 10 9 oz)       · -Weight 3lbs less than when patient was recently discharged  · Does not appear fluid overloaded  · Plan:  · Continue home Lasix and metoprolol

## 2022-05-03 NOTE — DISCHARGE SUMMARY
[address] [facility]  Discharge- Montana Maria Sr  1945, 78 y o  male MRN: [id number]  Unit/Bed#: [facility] 307-45 Encounter: [id number]  Primary Care Provider: Cailin Gaxiola MD   Date and time admitted to hospital: 5/1/2022  8:42 PM    * Shortness of breath  Assessment & Plan  · Acute on chronic respiratory failure with hypoxia secondary to COPD exacerbation  · -No relief with breathing treatment at home  · -Hx Chronic Respiratory Failure on baseline 2-3L NC in setting of prior Lung cancer surgery, COPD, CHF  · -Hx Lung Cancer Stage IA2 per chart review s/p SBRT to RUL 12/31/2020  · -On pta breo inhaler, lasix  · -Recently hospitalized and d/c'd on 4/24 for similar symptoms; Treated with Oral steroids, home diuretics and nebulizers with reported improvement  · -BNP elevated at 1322  · -CXR with evidence or R sided effusion  · -EKG: Sinus, rate 91, 1st degree AV block, RBBB, nonspecific st changes  Similar to previous EKG  · -Troponin negative x 2  · -Given Oral Prednisone, IV lasix and duonebs in the ER  · -No wheezing on exam, decreased breath sounds on the R  · -CT Chest without contrast in ER: Airspace opacities within the left upper lobe which may represent infection  (spoke with radiologist regarding this to clarify)  Recommend short-term follow-up chest CT scan in 3 months to evaluate for resolution  · Patient was able to be titrated to room air at rest, and refused to do ambulatory pulse ox to test for the oxygen    · Plan  · Pulmonary following  · Continue with recommendations per Pulmonary  · Trelegy sent to pharmacy, patient agreed and can afford it; discussed with case management  · DC home today        Chronic respiratory failure with hypoxia ([facility] [address] )  Assessment & Plan  · -On baseline 2-3 at rest  · - multifactorial, Hx Lung Cancer, COPD, CHF  · Complicated by continued cigarette use  · Plan:  · As above in SOB section    Atypical chest pain  Assessment & Plan  · -Chest heaviness  · -Troponin negative, EKG without ischemic changes  · -Previously seen several days ago at [facility] [place], [place] with similar chest pain and felt to not likely be cardiac in origin  · Plan:  · Will DC antibiotics, procalcitonin negative and patient is afebrile with no leukocytosis; low of suspicion for pneumonia  · Is atypical chest pain is likely related to his underlying COPD      History of lung cancer  Assessment & Plan  · -Hx Lung Cancer dx in 2020, stage IA2  · Plan:  · Follows with Pulmonary outpatient    Chronic diastolic heart failure (HCC)  Assessment & Plan  Wt Readings from Last 3 Encounters:   05/03/22 99 7 kg (219 lb 12 8 oz)   04/24/22 99 7 kg (219 lb 12 8 oz)   02/22/22 100 kg (220 lb 10 9 oz)       · -Weight 3lbs less than when patient was recently discharged  · Does not appear fluid overloaded  · Plan:  · Continue home Lasix and metoprolol      Medical Problems             Resolved Problems  Date Reviewed: 5/3/2022    None              Discharging Physician / Practitioner: Yamilet Jackson MD  PCP: Raffaele Daniel MD  Admission Date:   Admission Orders (From admission, onward)     Ordered        05/03/22 1506  Inpatient Admission  Once            05/02/22 0026  Place in Observation  Once                      Discharge Date: 05/03/22    Consultations During Hospital Stay:  · Pulmonology    Procedures Performed:   · None    Significant Findings / Test Results:   · None    Incidental Findings:   · None    Test Results Pending at Discharge (will require follow up): · None     Outpatient Tests Requested:  · pals program    Complications:  None    Reason for Admission:  Shortness of breath and chest heaviness    Hospital Course:   Adriana Lamb Sr  is a 78 y o  male patient who originally presented to the hospital on 5/1/2022 due to symptoms of shortness of breath that began that morning   Patient reports that he had similar symptoms about 1 5 weeks ago for which he was hospitalized at [facility] for copd/chf exacerbation  Patient reports that he tried his nebulizer at home but this did not provide relief  Patient also reports fatigue  Patient also reports a productive cough  Patient denies any documented fevers  Patient also reports a chest "heaviness" that he reports he had at last admission  Patient reports that he felt better after leaving the hospital for several days but that his symptoms returned this morning  Patient denies any increased abdominal distension on exam and reports his abdomen is always this size  Patient was given nebulizer as well as steroids in the ER without significant improvement in symptoms  Patient reports that steroids and nebulizer usually helps his symptoms  Patient denies any increased lower extremity edema or calf pain  Patient was evaluated by Pulmonary team   He was initially started on ceftriaxone azithromycin for cap treatment  Patient is afebrile with a leukocytosis  Patient's procalcitonin was negative was taken off antibiotics  The patient was in no zain his COPD exacerbation she was monitor off steroids  His return shortness of breath now was of the view of his resumption of cigarette use at home  Pulmonary team recommended Trelegy treatment  A case management aware the patient was able to afford trilogy for outpatient use  Patient medically optimized for discharge with outpatient follow-up with his PCP in 1 week and Pulmonary  Please see above list of diagnoses and related plan for additional information  Condition at Discharge: good    Discharge Day Visit / Exam:   Subjective:  Patient seen at bedside, no acute events overnight  Patient has no new complaints  Denies any shortness of breath chest pain, nausea, vomiting, diarrhea      Vitals: Blood Pressure: 146/71 (05/03/22 1432)  Pulse: 71 (05/03/22 1432)  Temperature: 98 3 °F (36 8 °C) (05/03/22 1432)  Temp Source: Oral (05/02/22 1422)  Respirations: 18 (05/03/22 1432)  Height: 6' (182 9 cm) (05/02/22 0147)  Weight - Scale: 99 7 kg (219 lb 12 8 oz) (05/03/22 0449)  SpO2: 94 % (05/03/22 1432)  Exam:   Physical Exam  Vitals and nursing note reviewed  Constitutional:       Appearance: He is well-developed  HENT:      Head: Normocephalic and atraumatic  Eyes:      Conjunctiva/sclera: Conjunctivae normal    Cardiovascular:      Rate and Rhythm: Normal rate and regular rhythm  Heart sounds: No murmur heard  Pulmonary:      Effort: Pulmonary effort is normal  No respiratory distress  Breath sounds: Normal breath sounds  Abdominal:      Palpations: Abdomen is soft  Tenderness: There is no abdominal tenderness  Musculoskeletal:      Cervical back: Neck supple  Skin:     General: Skin is warm and dry  Neurological:      Mental Status: He is alert  Discussion with Family: Patient declined call to   Discharge instructions/Information to patient and family:   See after visit summary for information provided to patient and family  Provisions for Follow-Up Care:  See after visit summary for information related to follow-up care and any pertinent home health orders  Disposition:   Home with [facility] Services (Reminder: Complete face to face encounter)    Planned Readmission: No     Discharge Statement:  I spent 35 minutes discharging the patient  This time was spent on the day of discharge  I had direct contact with the patient on the day of discharge  Greater than 50% of the total time was spent examining patient, answering all patient questions, arranging and discussing plan of care with patient as well as directly providing post-discharge instructions  Additional time then spent on discharge activities  Discharge Medications:  See after visit summary for reconciled discharge medications provided to patient and/or family        **Please Note: This note may have been constructed using a voice recognition system**

## 2022-05-04 ENCOUNTER — TRANSITIONAL CARE MANAGEMENT (OUTPATIENT)
Dept: FAMILY MEDICINE CLINIC | Facility: CLINIC | Age: 77
End: 2022-05-04

## 2022-05-04 LAB
BACTERIA SPT RESP CULT: ABNORMAL
GRAM STN SPEC: ABNORMAL

## 2022-05-05 ENCOUNTER — HOSPITAL ENCOUNTER (INPATIENT)
Facility: HOSPITAL | Age: 77
LOS: 2 days | Discharge: HOME | DRG: 313 | End: 2022-05-08
Attending: EMERGENCY MEDICINE | Admitting: INTERNAL MEDICINE
Payer: COMMERCIAL

## 2022-05-05 ENCOUNTER — APPOINTMENT (EMERGENCY)
Dept: RADIOLOGY | Facility: HOSPITAL | Age: 77
DRG: 313 | End: 2022-05-05
Payer: COMMERCIAL

## 2022-05-05 DIAGNOSIS — I10 ESSENTIAL HYPERTENSION: ICD-10-CM

## 2022-05-05 DIAGNOSIS — I25.10 CORONARY ARTERY DISEASE INVOLVING NATIVE CORONARY ARTERY OF NATIVE HEART WITHOUT ANGINA PECTORIS: ICD-10-CM

## 2022-05-05 DIAGNOSIS — R07.9 CHEST PAIN: Primary | ICD-10-CM

## 2022-05-05 LAB
2HR DELTA HS TROPONIN: 5 NG/L
ANION GAP SERPL CALCULATED.3IONS-SCNC: 6 MMOL/L (ref 4–13)
BASOPHILS # BLD AUTO: 0.02 THOUSANDS/ΜL (ref 0–0.1)
BASOPHILS NFR BLD AUTO: 0 % (ref 0–1)
BUN SERPL-MCNC: 26 MG/DL (ref 5–25)
CALCIUM SERPL-MCNC: 8.3 MG/DL (ref 8.3–10.1)
CARDIAC TROPONIN I PNL SERPL HS: 12 NG/L
CARDIAC TROPONIN I PNL SERPL HS: 17 NG/L
CHLORIDE SERPL-SCNC: 106 MMOL/L (ref 100–108)
CO2 SERPL-SCNC: 25 MMOL/L (ref 21–32)
CREAT SERPL-MCNC: 1.15 MG/DL (ref 0.6–1.3)
EOSINOPHIL # BLD AUTO: 0.04 THOUSAND/ΜL (ref 0–0.61)
EOSINOPHIL NFR BLD AUTO: 0 % (ref 0–6)
ERYTHROCYTE [DISTWIDTH] IN BLOOD BY AUTOMATED COUNT: 16 % (ref 11.6–15.1)
GFR SERPL CREATININE-BSD FRML MDRD: 62 ML/MIN/1.73SQ M
GLUCOSE SERPL-MCNC: 243 MG/DL (ref 65–140)
HCT VFR BLD AUTO: 38.1 % (ref 36.5–49.3)
HGB BLD-MCNC: 11.7 G/DL (ref 12–17)
IMM GRANULOCYTES # BLD AUTO: 0.04 THOUSAND/UL (ref 0–0.2)
IMM GRANULOCYTES NFR BLD AUTO: 0 % (ref 0–2)
LYMPHOCYTES # BLD AUTO: 0.96 THOUSANDS/ΜL (ref 0.6–4.47)
LYMPHOCYTES NFR BLD AUTO: 8 % (ref 14–44)
MCH RBC QN AUTO: 25.2 PG (ref 26.8–34.3)
MCHC RBC AUTO-ENTMCNC: 30.7 G/DL (ref 31.4–37.4)
MCV RBC AUTO: 82 FL (ref 82–98)
MONOCYTES # BLD AUTO: 0.68 THOUSAND/ΜL (ref 0.17–1.22)
MONOCYTES NFR BLD AUTO: 6 % (ref 4–12)
NEUTROPHILS # BLD AUTO: 9.76 THOUSANDS/ΜL (ref 1.85–7.62)
NEUTS SEG NFR BLD AUTO: 86 % (ref 43–75)
NRBC BLD AUTO-RTO: 0 /100 WBCS
PLATELET # BLD AUTO: 142 THOUSANDS/UL (ref 149–390)
PMV BLD AUTO: 10.5 FL (ref 8.9–12.7)
POTASSIUM SERPL-SCNC: 4.6 MMOL/L (ref 3.5–5.3)
RBC # BLD AUTO: 4.65 MILLION/UL (ref 3.88–5.62)
SODIUM SERPL-SCNC: 137 MMOL/L (ref 136–145)
WBC # BLD AUTO: 11.5 THOUSAND/UL (ref 4.31–10.16)

## 2022-05-05 PROCEDURE — 71046 X-RAY EXAM CHEST 2 VIEWS: CPT

## 2022-05-05 PROCEDURE — 80048 BASIC METABOLIC PNL TOTAL CA: CPT

## 2022-05-05 PROCEDURE — 96374 THER/PROPH/DIAG INJ IV PUSH: CPT

## 2022-05-05 PROCEDURE — 99285 EMERGENCY DEPT VISIT HI MDM: CPT | Performed by: EMERGENCY MEDICINE

## 2022-05-05 PROCEDURE — 85025 COMPLETE CBC W/AUTO DIFF WBC: CPT

## 2022-05-05 PROCEDURE — 99285 EMERGENCY DEPT VISIT HI MDM: CPT

## 2022-05-05 PROCEDURE — 99220 PR INITIAL OBSERVATION CARE/DAY 70 MINUTES: CPT | Performed by: INTERNAL MEDICINE

## 2022-05-05 PROCEDURE — 93005 ELECTROCARDIOGRAM TRACING: CPT

## 2022-05-05 PROCEDURE — 36415 COLL VENOUS BLD VENIPUNCTURE: CPT

## 2022-05-05 PROCEDURE — 84484 ASSAY OF TROPONIN QUANT: CPT

## 2022-05-05 RX ORDER — KETOROLAC TROMETHAMINE 30 MG/ML
30 INJECTION, SOLUTION INTRAMUSCULAR; INTRAVENOUS ONCE
Status: COMPLETED | OUTPATIENT
Start: 2022-05-05 | End: 2022-05-05

## 2022-05-05 RX ORDER — ASPIRIN 81 MG/1
162 TABLET, CHEWABLE ORAL ONCE
Status: COMPLETED | OUTPATIENT
Start: 2022-05-05 | End: 2022-05-05

## 2022-05-05 RX ADMIN — ASPIRIN 81 MG CHEWABLE TABLET 162 MG: 81 TABLET CHEWABLE at 23:05

## 2022-05-05 RX ADMIN — KETOROLAC TROMETHAMINE 30 MG: 30 INJECTION, SOLUTION INTRAMUSCULAR at 22:44

## 2022-05-05 NOTE — ED PROVIDER NOTES
History  Chief Complaint   Patient presents with    Chest Pain     Pt states that chest pain and SOB started around 12 today, was admitted for similar symptoms on sunday     78year-old male with history of CHF, COPD, CAD, DM, GERD, hyperlipidemia, hypertension, prior MI status post stenting x3, prostate cancer, presents due to chest pain  He was recently admitted for similar complaints and notes that although his pain improved, his breathing remained difficult  Today when he was sitting in his chair at home, he started having return of chest pain located under his right breast  It does not radiate  Associated with mild nausea but no vomiting  He denies associated diaphoresis, worsening with movement or deep breathing, and denies other subjective symptoms  He has been compliant with medications  Prior to Admission Medications   Prescriptions Last Dose Informant Patient Reported? Taking? BD Insulin Syringe U-500 31G X 6MM 0 5 ML MISC   No No   Sig: USE 1 SYRINGE THREE TIMES A DAY FOR INJECTING INSULIN   HUMULIN R 500 UNIT/ML CONCENTRATED injection   No No   Sig: INJECT 45 UNITS TWICE DAILY BEFORE BREAKFAST AND LUNCH   cyclobenzaprine (FLEXERIL) 10 mg tablet   No No   Sig: Take 1 tablet (10 mg total) by mouth 3 (three) times a day for 10 days   ferrous sulfate 325 (65 Fe) mg tablet 5/5/2022 at Unknown time  No Yes   Sig: Take 1 tablet (325 mg total) by mouth daily with breakfast   fluticasone-umeclidinium-vilanterol (Trelegy Ellipta) 100-62 5-25 MCG/INH inhaler 5/5/2022 at Unknown time  No Yes   Sig: Inhale 1 puff daily Rinse mouth after use  fluticasone-umeclidinium-vilanterol (Trelegy Ellipta) 100-62 5-25 MCG/INH inhaler 5/5/2022 at Unknown time  No Yes   Sig: Inhale 1 puff daily Rinse mouth after use     furosemide (LASIX) 40 mg tablet 5/5/2022 at Unknown time  No Yes   Sig: Take 1 tablet (40 mg total) by mouth daily   glucose blood (OneTouch Verio) test strip   No No   Sig: May substitute brand based on insurance coverage  Check glucose QID    ipratropium-albuterol (DUO-NEB) 0 5-2 5 mg/3 mL nebulizer solution   No No   Sig: Take 3 mL by nebulization 3 (three) times a day   metoprolol tartrate (LOPRESSOR) 25 mg tablet 5/5/2022 at Unknown time  No Yes   Sig: TAKE ONE TABLET BY MOUTH TWICE A DAY   potassium chloride (K-DUR,KLOR-CON) 20 mEq tablet 5/5/2022 at Unknown time  No Yes   Sig: Take 1 tablet (20 mEq total) by mouth daily   simvastatin (ZOCOR) 40 mg tablet 5/5/2022 at Unknown time  No Yes   Sig: TAKE ONE TABLET BY MOUTH EVERY DAY      Facility-Administered Medications: None       Past Medical History:   Diagnosis Date    Abdominal pain     [facility]DA [facility]) ([facility] ) 6/19/2018    Cardiac disease     [facility]congestive [facility] failure[facility])     COPD, severe ([facility] )     Coronary artery disease     DDD (degenerative disc disease), lumbar 9/1/2020    Diabetes mellitus ([place] [id number] )     Dyspnea     GERD (gastroesophageal reflux disease)     Hyperlipidemia     Hypertension     [facility] )     with 3 stents    Nodule of apex of right lung     JACQUELINE (obstructive sleep apnea)     Prostate cancer [facility])        Past Surgical History:   Procedure Laterality Date    ABDOMINAL SURGERY      exploratory    ANGIOPLASTY      3 stents    APPENDECTOMY      COLONOSCOPY  2015    CT NEEDLE BIOPSY LUNG  11/3/2020    ESOPHAGOGASTRODUODENOSCOPY N/A 10/2/2017    Procedure: ESOPHAGOGASTRODUODENOSCOPY (EGD); Surgeon: Wilver Elliott MD;  [facility]; Service: Gastroenterology    IR THORACENTESIS  11/3/2020    KNEE CARTILAGE SURGERY      OTHER SURGICAL HISTORY      stent placement    PROSTATE SURGERY      SKIN GRAFT      Basal cell CA back       Family History   Problem Relation Age of Onset    Heart disease Father     Other Father         Mesothelioma      I have reviewed and agree with the history as documented      E-Cigarette/Vaping    E-Cigarette Use Never User      E-Cigarette/Vaping Substances    Nicotine No     THC No     CBD No     Flavoring No     Other No     Unknown No      Social History     Tobacco Use    Smoking status: Former Smoker     Packs/day: 1 50     Years: 50 00     Pack years: 75 00     Start date: 38     Quit date: 2020     Years since quittin 8    Smokeless tobacco: Never Used   Vaping Use    Vaping Use: Never used   Substance Use Topics    Alcohol use: Never    Drug use: No        Review of Systems   All other systems reviewed and are negative  Physical Exam  ED Triage Vitals   Temperature Pulse Respirations Blood Pressure SpO2   22 1920 22 1852 22 1852 22 1852 22 1852   98 9 °F (37 2 °C) 105 22 165/80 99 %      Temp Source Heart Rate Source Patient Position - Orthostatic VS BP Location FiO2 (%)   22 1920 22 0031 22 0031 22 0031 --   Oral Monitor Lying Left arm       Pain Score       22 2244       7             Orthostatic Vital Signs  Vitals:    22 0642 22 0643 22 1305 22 1444   BP: 165/65 165/65 165/65 167/70   Pulse: 61 65 65 61   Patient Position - Orthostatic VS:           Physical Exam  Vitals and nursing note reviewed  Constitutional:       General: He is not in acute distress  Appearance: He is obese  HENT:      Head: Normocephalic and atraumatic  Right Ear: External ear normal       Left Ear: External ear normal       Nose: Nose normal       Mouth/Throat:      Mouth: Mucous membranes are moist    Eyes:      Conjunctiva/sclera: Conjunctivae normal       Pupils: Pupils are equal, round, and reactive to light  Cardiovascular:      Rate and Rhythm: Normal rate and regular rhythm  Heart sounds: Murmur heard  Pulmonary:      Effort: Pulmonary effort is normal  No respiratory distress  Breath sounds: Rales (bilateral lung bases) present  Abdominal:      Palpations: Abdomen is soft  Tenderness: There is no abdominal tenderness  Musculoskeletal:      Cervical back: Neck supple  Right lower leg: Edema present  Left lower leg: Edema present  Skin:     General: Skin is warm and dry  Findings: Rash (bilateral lower extremity dermatitis) present  Neurological:      General: No focal deficit present  Mental Status: He is alert     Psychiatric:         Mood and Affect: Mood normal          ED Medications  Medications   pravastatin (PRAVACHOL) tablet 80 mg (80 mg Oral Given 5/7/22 1555)   insulin regular (HumuLIN R U-500) 500 units/mL CONCENTRATED injection 45 Units (45 Units Subcutaneous Given 5/8/22 0640)   furosemide (LASIX) tablet 40 mg (40 mg Oral Given 5/8/22 1025)   fluticasone-vilanterol (BREO ELLIPTA) 100-25 mcg/inh inhaler 1 puff (1 puff Inhalation Given 5/8/22 1024)   enoxaparin (LOVENOX) subcutaneous injection 40 mg (40 mg Subcutaneous Refused 5/8/22 1025)   ondansetron (ZOFRAN) injection 4 mg (has no administration in time range)   insulin lispro (HumaLOG) 100 units/mL subcutaneous injection 1-6 Units (1 Units Subcutaneous Not Given 5/8/22 1546)   insulin lispro (HumaLOG) 100 units/mL subcutaneous injection 1-5 Units (4 Units Subcutaneous Given 5/7/22 2125)   nitroglycerin (NITROSTAT) SL tablet 0 4 mg (0 4 mg Sublingual Given 5/6/22 0150)   carvedilol (COREG) tablet 6 25 mg (6 25 mg Oral Given 5/8/22 0657)   albuterol inhalation solution 2 5 mg (has no administration in time range)   ferrous sulfate tablet 325 mg (325 mg Oral Given 5/8/22 1025)   lisinopril (ZESTRIL) tablet 10 mg (has no administration in time range)   aspirin (ECOTRIN LOW STRENGTH) EC tablet 81 mg (has no administration in time range)   perflutren lipid microsphere (DEFINITY) injection (has no administration in time range)   ketorolac (TORADOL) injection 30 mg (30 mg Intravenous Given 5/5/22 4584)   aspirin chewable tablet 162 mg (162 mg Oral Given 5/5/22 2305)   hydrALAZINE (APRESOLINE) injection 5 mg (5 mg Intravenous Given 5/6/22 1159) regadenoson (LEXISCAN) injection 0 4 mg (0 4 mg Intravenous Given 5/7/22 1256)       Diagnostic Studies  Results Reviewed     Procedure Component Value Units Date/Time    D-dimer, quantitative [[id number]]  (Abnormal) Collected: 05/06/22 0150    Lab Status: Final result Specimen: Blood from Arm, Right Updated: 05/06/22 0336     D-Dimer, Quant 0 56 ug/ml FEU     Narrative: In the evaluation for possible pulmonary embolism, in the appropriate (Well's Score of 4 or less) patient, the age adjusted d-dimer cutoff for this patient can be calculated as:    Age x 0 01 (in ug/mL) for Age-adjusted D-dimer exclusion threshold for a patient over 50 years      HS Troponin I 2hr [[id number]]  (Normal) Collected: 05/05/22 2150    Lab Status: Final result Specimen: Blood from Hand, Right Updated: 05/05/22 2224     hs TnI 2hr 17 ng/L      Delta 2hr hsTnI 5 ng/L     HS Troponin 0hr (reflex protocol) [[id number]]  (Normal) Collected: 05/05/22 1919    Lab Status: Final result Specimen: Blood from Arm, Right Updated: 05/05/22 1957     hs TnI 0hr 12 ng/L     Basic metabolic panel [[id number]]  (Abnormal) Collected: 05/05/22 1919    Lab Status: Final result Specimen: Blood from Arm, Right Updated: 05/05/22 1952     Sodium 137 mmol/L      Potassium 4 6 mmol/L      Chloride 106 mmol/L      CO2 25 mmol/L      ANION GAP 6 mmol/L      BUN 26 mg/dL      Creatinine 1 15 mg/dL      Glucose 243 mg/dL      Calcium 8 3 mg/dL      eGFR 62 ml/min/1 73sq m     Narrative:      [place] guidelines for Chronic Kidney Disease (CKD):     Stage 1 with normal or high GFR (GFR > 90 mL/min/1 73 square meters)    Stage 2 Mild CKD (GFR = 60-89 mL/min/1 73 square meters)    Stage 3A Moderate CKD (GFR = 45-59 mL/min/1 73 square meters)    Stage 3B Moderate CKD (GFR = 30-44 mL/min/1 73 square meters)    Stage 4 Severe CKD (GFR = 15-29 mL/min/1 73 square meters)    Stage 5 End Stage CKD (GFR <15 mL/min/1 73 square meters)  Note: GFR calculation is accurate only with a steady state creatinine    CBC and differential [[id number]]  (Abnormal) Collected: 05/05/22 1919    Lab Status: Final result Specimen: Blood from Arm, Right Updated: 05/05/22 1932     WBC 11 50 Thousand/uL      RBC 4 65 Million/uL      Hemoglobin 11 7 g/dL      Hematocrit 38 1 %      MCV 82 fL      MCH 25 2 pg      MCHC 30 7 g/dL      RDW 16 0 %      MPV 10 5 fL      Platelets 014 Thousands/uL      nRBC 0 /100 WBCs      Neutrophils Relative 86 %      Immat GRANS % 0 %      Lymphocytes Relative 8 %      Monocytes Relative 6 %      Eosinophils Relative 0 %      Basophils Relative 0 %      Neutrophils Absolute 9 76 Thousands/µL      Immature Grans Absolute 0 04 Thousand/uL      Lymphocytes Absolute 0 96 Thousands/µL      Monocytes Absolute 0 68 Thousand/µL      Eosinophils Absolute 0 04 Thousand/µL      Basophils Absolute 0 02 Thousands/µL                  XR chest 2 views   ED Interpretation by Nhan Cueto DO (05/05 2211)   Chest x-ray interpreted me shows a chronic scarring of the right upper lobe, chronic right-sided pleural effusion versus atelectasis, no acute pathology, no acute change from May 1, 2022      Final Result by Reny Lilly MD (05/06 0920)      1  No acute pulmonary process  Recent chest CT noted airspace opacities in the left upper lobe, not appreciated on this chest x-ray exam    2   Chronic pleural parenchymal changes in the right lower lung zone with possible small effusion                    Workstation performed: [id number]               Procedures  ECG 12 Lead Documentation Only    Date/Time: 5/5/2022 4:08 PM  Performed by: Jesu Salinas MD  Authorized by: Jesu Salinas MD     ECG reviewed by me, the ED Provider: yes    Patient location:  ED  Previous ECG:     Previous ECG:  Compared to current    Similarity:  No change  Interpretation:     Interpretation: abnormal    Rate:     ECG rate assessment: normal    Rhythm:     Rhythm: A-V block    Ectopy: Ectopy: none    QRS:     QRS axis:  Normal    QRS intervals: Wide  Conduction:     Conduction: abnormal      Abnormal conduction: complete RBBB and bifascicular block    ST segments:     ST segments:  Normal  T waves:     T waves: non-specific            ED Course  ED Course as of 05/08/22 1611   Thu May 05, 2022   2156 Repeat EKG unchanged  HEART Risk Score      Most Recent Value   Heart Score Risk Calculator    History 1 Filed at: 05/08/2022 1610   ECG 0 Filed at: 05/08/2022 1610   Age 2 Filed at: 05/08/2022 1610   Risk Factors 2 Filed at: 05/08/2022 1610   Troponin 1 Filed at: 05/08/2022 1610   HEART Score 6 Filed at: 05/08/2022 1610                      SBIRT 20yo+      Most Recent Value   SBIRT (25 yo +)    In order to provide better care to our patients, we are screening all of our patients for alcohol and drug use  Would it be okay to ask you these screening questions? No Filed at: 05/05/2022 1856        MIKKI Risk Score      Most Recent Value   Age >= 72 1 Filed at: 05/05/2022 2337   Known CAD (stenosis >= 50%) 1 Filed at: 05/05/2022 2337   Recent (<=24 hrs) Service Angina 0 Filed at: 05/05/2022 2337   ST Deviation >= 0 5 mm 0 Filed at: 05/05/2022 2337   3+ CAD Risk Factors (FHx, HTN, HLP, DM, Smoker) 1 Filed at: 05/05/2022 2337   Aspirin Use Past 7 Days 0 Filed at: 05/05/2022 2337   Elevated Cardiac Markers 0 Filed at: 05/05/2022 2337   MIKKI Risk Score (Calculated) 3 Filed at: 05/05/2022 2337              MDM  Number of Diagnoses or Management Options  Chest pain  Diagnosis management comments: 78 y o M w/ complicated cardiopulmonary history presenting with ACS  Will order ACS w/u  HEART score 6  ASA and toradol given for comfort  Patient high risk for cardiac events  Will admit to medicine service for further evaluation and management  Patient agreeable with plan         Disposition  Final diagnoses:   Chest pain     Time reflects when diagnosis was documented in both MDM as applicable and the Disposition within this note     Time User Action Codes Description Comment    5/5/2022 11:22 PM Bin Mt Add [R07 9] Chest pain     5/8/2022 11:09 AM Deyvi Barakat Add [I10] Essential hypertension     5/8/2022 11:09 AM Deyvi Barakat Add [I25 10] Coronary artery disease involving native coronary artery of native heart without angina pectoris       ED Disposition     ED Disposition Condition Date/Time Comment    Admit Stable Thu May 5, 2022 11:22 PM Case was discussed with Dr Delisa Albert and the patient's admission status was agreed to be Admission Status: observation status to the service of Dr Delisa Albert   Follow-up Information     Follow up With Specialties Details Why [address] MD Family Medicine Follow up  [address]  [address] [facility]  [id number] [place] [address]  [phone]            Current Discharge Medication List      START taking these medications    Details   aspirin (ECOTRIN LOW STRENGTH) 81 mg EC tablet Take 1 tablet (81 mg total) by mouth daily  Qty: 30 tablet, Refills: 2    Associated Diagnoses: Essential hypertension; Coronary artery disease involving native coronary artery of native heart without angina pectoris      carvedilol (COREG) 6 25 mg tablet Take 1 tablet (6 25 mg total) by mouth 2 (two) times a day with meals  Qty: 60 tablet, Refills: 1    Associated Diagnoses: Essential hypertension; Coronary artery disease involving native coronary artery of native heart without angina pectoris      lisinopril (ZESTRIL) 10 mg tablet Take 1 tablet (10 mg total) by mouth daily  Qty: 30 tablet, Refills: 0    Associated Diagnoses: Essential hypertension         CONTINUE these medications which have NOT CHANGED    Details   ferrous sulfate 325 (65 Fe) mg tablet Take 1 tablet (325 mg total) by mouth daily with breakfast  Qty: 30 tablet, Refills: 0    Associated Diagnoses: Stage 3a chronic kidney disease ([address] )      ! ! fluticasone-umeclidinium-vilanterol (Trelegy Ellipta) 100-62 5-25 MCG/INH inhaler Inhale 1 puff daily Rinse mouth after use  Qty: 60 each, Refills: 3    Associated Diagnoses: Chronic obstructive pulmonary disease, unspecified COPD type ([facility] )      ! ! fluticasone-umeclidinium-vilanterol (Trelegy Ellipta) 100-62 5-25 MCG/INH inhaler Inhale 1 puff daily Rinse mouth after use  Qty: 60 blister, Refills: 0    Associated Diagnoses: COPD exacerbation ([facility])      furosemide (LASIX) 40 mg tablet Take 1 tablet (40 mg total) by mouth daily  Qty: 30 tablet, Refills: 0    Comments: Take dose with any 3 pound weight gain   Associated Diagnoses: Acute on chronic diastolic (congestive) heart failure ([facility])      potassium chloride (K-DUR,KLOR-CON) 20 mEq tablet Take 1 tablet (20 mEq total) by mouth daily  Qty: 60 tablet, Refills: 0    Comments: Take this pill when you take your lasix  Associated Diagnoses: Chronic respiratory failure with hypoxia and hypercapnia ([facility])      simvastatin (ZOCOR) 40 mg tablet TAKE ONE TABLET BY MOUTH EVERY DAY  Qty: 60 tablet, Refills: 2    Associated Diagnoses: Hypercholesterolemia      BD Insulin Syringe U-500 31G X 6MM 0 5 ML MISC USE 1 SYRINGE THREE TIMES A DAY FOR INJECTING INSULIN  Qty: 100 each, Refills: 5    Associated Diagnoses: Type 2 diabetes mellitus with hyperglycemia, with long-term current use of insulin ([facility])      cyclobenzaprine (FLEXERIL) 10 mg tablet Take 1 tablet (10 mg total) by mouth 3 (three) times a day for 10 days  Qty: 30 tablet, Refills: 0    Associated Diagnoses: Chronic bilateral low back pain, unspecified whether sciatica present      glucose blood (OneTouch Verio) test strip May substitute brand based on insurance coverage  Check glucose QID    Qty: 100 each, Refills: 0    Associated Diagnoses: Type 2 diabetes mellitus with hyperglycemia, with long-term current use of insulin ([facility])      HUMULIN R 500 UNIT/ML CONCENTRATED injection INJECT 45 UNITS TWICE DAILY BEFORE BREAKFAST AND LUNCH  Qty: 40 mL, Refills: 3    Associated Diagnoses: Chronic respiratory failure with hypoxia and hypercapnia ([facility] ); Type 2 diabetes mellitus with complication, with long-term current use of insulin ([facility])      ipratropium-albuterol (DUO-NEB) 0 5-2 5 mg/3 mL nebulizer solution Take 3 mL by nebulization 3 (three) times a day  Qty: 3 mL, Refills: 0    Associated Diagnoses: Chronic respiratory failure with hypoxia ([facility] )       ! ! - Potential duplicate medications found  Please discuss with provider  STOP taking these medications       metoprolol tartrate (LOPRESSOR) 25 mg tablet Comments:   Reason for Stopping:             Outpatient Discharge Orders   Basic metabolic panel   Standing Status: Future Standing Exp  Date: 05/08/23       PDMP Review       Value Time User    PDMP Reviewed  Yes 5/5/2022 11:38 PM Leann Shoemaker DO           ED Provider  Attending physically available and evaluated Josefina Kedillan Raymundo    I managed the patient along with the ED Attending      Electronically Signed by         Lizandro Hassan MD  05/08/22 3903

## 2022-05-05 NOTE — ED ATTENDING ATTESTATION
5/5/2022  IMing DO, saw and evaluated the patient  I have discussed the patient with the resident/non-physician practitioner and agree with the resident's/non-physician practitioner's findings, Plan of Care, and MDM as documented in the resident's/non-physician practitioner's note, except where noted  All available labs and Radiology studies were reviewed  I was present for key portions of any procedure(s) performed by the resident/non-physician practitioner and I was immediately available to provide assistance  At this point I agree with the current assessment done in the Emergency Department  I have conducted an independent evaluation of this patient a history and physical is as follows:    Patient is a 73-year-old gentleman with a history of 2 L O2 dependent COPD and CHF, presents for recurrent chest pain and shortness of breath  Patient was hospitalized May 1st through May 3rd for shortness of breath and right-sided chest discomfort, which felt similar to prior symptoms he had had about a week and half prior for which was also hospitalized  On the 1st he had tried his nebulizer which did not provide significant relief, and a right-sided chest heaviness which he has had off and on  The heaviness comes and goes, not exertion related, not knife-like, ripping, or tearing and not pleuritic in nature  During his previous hospitalizations he has had serial cardiac biomarkers which were unremarkable  During the last hospitalization it was felt the patient may have a respiratory infection secondary to CT findings was initially started on antibiotics, however he was afebrile, procalcitonin unremarkable, patient taken off antibiotics and remained afebrile  Patient said his chest discomfort resolved the day of discharge, and he was still feeling short of breath at discharge but felt good enough to go home  It was recommended that he start Trelegy    Patient says that after going home, he has noticed some episodes of right-sided chest discomfort and feeling a little more short of breath  He says he is compliant with his medications, and is trying to quit smoking, he says currently he goes through about a pack of cigarettes a month, last cigarette was about a week and half ago  Patient says currently he has got a slight chronic cough, lives with his son who is asymptomatic  He says his right-sided chest discomfort feels similar to his multiple prior episodes  General:  Patient is well-appearing  Head:  Atraumatic  Eyes:  Conjunctiva pink  ENT:  Mucous membranes are moist  Neck:  Supple  Cardiac:  S1-S2 present without murmurs  Lungs:  Very trace expiratory wheeze, no rales or rhonchi  Abdomen:  Soft, nontender, normal bowel sounds, no CVA tenderness, no tympany, no rigidity, no guarding, easily reducible small umbilical hernia  Extremities:  Normal range of motion, patient has trace nonpitting pedal edema which he says is chronic for him, no calf asymmetry  Neurologic:  Awake, fluent speech, normal comprehension  AAOx3     Skin:  Pink warm and dry  Psychiatric:  Alert, pleasant, cooperative            ED Course     ECG interpreted me, atrial fibrillation, right bundle-branch block, no rapid ventricular response, ST segment elevation or depression, there is some old lateral and inferior infarct changes, no acute change from May 1, 2022    XR chest 2 views   ED Interpretation   Chest x-ray interpreted me shows a chronic scarring of the right upper lobe, chronic right-sided pleural effusion versus atelectasis, no acute pathology, no acute change from May 1, 2022          Labs Reviewed   CBC AND DIFFERENTIAL - Abnormal       Result Value Ref Range Status    WBC 11 50 (*) 4 31 - 10 16 Thousand/uL Final    RBC 4 65  3 88 - 5 62 Million/uL Final    Hemoglobin 11 7 (*) 12 0 - 17 0 g/dL Final    Hematocrit 38 1  36 5 - 49 3 % Final    MCV 82  82 - 98 fL Final    MCH 25 2 (*) 26 8 - 34 3 pg Final    MCHC 30 7 (*) 31 4 - 37 4 g/dL Final    RDW 16 0 (*) 11 6 - 15 1 % Final    MPV 10 5  8 9 - 12 7 fL Final    Platelets 800 (*) 575 - 390 Thousands/uL Final    nRBC 0  /100 WBCs Final    Neutrophils Relative 86 (*) 43 - 75 % Final    Immat GRANS % 0  0 - 2 % Final    Lymphocytes Relative 8 (*) 14 - 44 % Final    Monocytes Relative 6  4 - 12 % Final    Eosinophils Relative 0  0 - 6 % Final    Basophils Relative 0  0 - 1 % Final    Neutrophils Absolute 9 76 (*) 1 85 - 7 62 Thousands/µL Final    Immature Grans Absolute 0 04  0 00 - 0 20 Thousand/uL Final    Lymphocytes Absolute 0 96  0 60 - 4 47 Thousands/µL Final    Monocytes Absolute 0 68  0 17 - 1 22 Thousand/µL Final    Eosinophils Absolute 0 04  0 00 - 0 61 Thousand/µL Final    Basophils Absolute 0 02  0 00 - 0 10 Thousands/µL Final   BASIC METABOLIC PANEL - Abnormal    Sodium 137  136 - 145 mmol/L Final    Potassium 4 6  3 5 - 5 3 mmol/L Final    Comment: Slightly Hemolyzed; Results May be Affected    Chloride 106  100 - 108 mmol/L Final    CO2 25  21 - 32 mmol/L Final    ANION GAP 6  4 - 13 mmol/L Final    BUN 26 (*) 5 - 25 mg/dL Final    Creatinine 1 15  0 60 - 1 30 mg/dL Final    Comment: Standardized to IDMS reference method    Glucose 243 (*) 65 - 140 mg/dL Final    Comment: If the patient is fasting, the ADA then defines impaired fasting glucose as > 100 mg/dL and diabetes as > or equal to 123 mg/dL  Specimen collection should occur prior to Sulfasalazine administration due to the potential for falsely depressed results  Specimen collection should occur prior to Sulfapyridine administration due to the potential for falsely elevated results      Calcium 8 3  8 3 - 10 1 mg/dL Final    eGFR 62  ml/min/1 73sq m Final    Narrative:     [place] guidelines for Chronic Kidney Disease (CKD):     Stage 1 with normal or high GFR (GFR > 90 mL/min/1 73 square meters)    Stage 2 Mild CKD (GFR = 60-89 mL/min/1 73 square meters)    Stage 3A Moderate CKD (GFR = 45-59 mL/min/1 73 square meters)    Stage 3B Moderate CKD (GFR = 30-44 mL/min/1 73 square meters)    Stage 4 Severe CKD (GFR = 15-29 mL/min/1 73 square meters)    Stage 5 End Stage CKD (GFR <15 mL/min/1 73 square meters)  Note: GFR calculation is accurate only with a steady state creatinine   HS TROPONIN I 0HR - Normal    hs TnI 0hr 12  "Refer to ACS Flowchart"- see link ng/L Final    Comment:                                              Initial (time 0) result  If >=50 ng/L, Myocardial injury suggested ;  Type of myocardial injury and treatment strategy  to be determined  If 5-49 ng/L, a delta result at 2 hours and or 4 hours will be needed to further evaluate  If <4 ng/L, and chest pain has been >3 hours since onset, patient may qualify for discharge based on the HEART score in the ED  If <5 ng/L and <3hours since onset of chest pain, a delta result at 2 hours will be needed to further evaluate  HS Troponin 99th Percentile URL of a Health Population=12 ng/L with a 95% Confidence Interval of 8-18 ng/L  Second Troponin (time 2 hours)  If calculated delta >= 20 ng/L,  Myocardial injury suggested ; Type of myocardial injury and treatment strategy to be determined  If 5-49 ng/L and the calculated delta is 5-19 ng/L, consult medical service for evaluation  Continue evaluation for ischemia on ecg and other possible etiology and repeat hs troponin at 4 hours  If delta is <5 ng/L at 2 hours, consider discharge based on risk stratification via the HEART score (if in ED), or MIKKI risk score in IP/Observation  HS Troponin 99th Percentile URL of a Health Population=12 ng/L with a 95% Confidence Interval of 8-18 ng/L    HS TROPONIN I 2HR - Normal    hs TnI 2hr 17  "Refer to ACS Flowchart"- see link ng/L Final    Comment:                                              Initial (time 0) result  If >=50 ng/L, Myocardial injury suggested ;  Type of myocardial injury and treatment strategy  to be determined    If 5-49 ng/L, a delta result at 2 hours and or 4 hours will be needed to further evaluate  If <4 ng/L, and chest pain has been >3 hours since onset, patient may qualify for discharge based on the HEART score in the ED  If <5 ng/L and <3hours since onset of chest pain, a delta result at 2 hours will be needed to further evaluate  HS Troponin 99th Percentile URL of a Health Population=12 ng/L with a 95% Confidence Interval of 8-18 ng/L  Second Troponin (time 2 hours)  If calculated delta >= 20 ng/L,  Myocardial injury suggested ; Type of myocardial injury and treatment strategy to be determined  If 5-49 ng/L and the calculated delta is 5-19 ng/L, consult medical service for evaluation  Continue evaluation for ischemia on ecg and other possible etiology and repeat hs troponin at 4 hours  If delta is <5 ng/L at 2 hours, consider discharge based on risk stratification via the HEART score (if in ED), or MIKKI risk score in IP/Observation  HS Troponin 99th Percentile URL of a Health Population=12 ng/L with a 95% Confidence Interval of 8-18 ng/L  Delta 2hr hsTnI 5  <20 ng/L Final       On reassessment, patient feeling a little better, delta troponin elevated, plan is to discuss admission with patient, anticipate admission based on Stephanie Miramontes protocol   Signed out to Dr Sumit Morales Time  Procedures

## 2022-05-06 LAB
D DIMER PPP FEU-MCNC: 0.56 UG/ML FEU
GLUCOSE SERPL-MCNC: 209 MG/DL (ref 65–140)
GLUCOSE SERPL-MCNC: 264 MG/DL (ref 65–140)
GLUCOSE SERPL-MCNC: 338 MG/DL (ref 65–140)
GLUCOSE SERPL-MCNC: 93 MG/DL (ref 65–140)

## 2022-05-06 PROCEDURE — 93010 ELECTROCARDIOGRAM REPORT: CPT | Performed by: INTERNAL MEDICINE

## 2022-05-06 PROCEDURE — 94760 N-INVAS EAR/PLS OXIMETRY 1: CPT

## 2022-05-06 PROCEDURE — 36415 COLL VENOUS BLD VENIPUNCTURE: CPT | Performed by: INTERNAL MEDICINE

## 2022-05-06 PROCEDURE — 82948 REAGENT STRIP/BLOOD GLUCOSE: CPT

## 2022-05-06 PROCEDURE — 85379 FIBRIN DEGRADATION QUANT: CPT | Performed by: INTERNAL MEDICINE

## 2022-05-06 PROCEDURE — 99232 SBSQ HOSP IP/OBS MODERATE 35: CPT | Performed by: INTERNAL MEDICINE

## 2022-05-06 RX ORDER — ENOXAPARIN SODIUM 100 MG/ML
40 INJECTION SUBCUTANEOUS DAILY
Status: DISCONTINUED | OUTPATIENT
Start: 2022-05-06 | End: 2022-05-08 | Stop reason: HOSPADM

## 2022-05-06 RX ORDER — INSULIN LISPRO 100 [IU]/ML
1-5 INJECTION, SOLUTION INTRAVENOUS; SUBCUTANEOUS
Status: DISCONTINUED | OUTPATIENT
Start: 2022-05-06 | End: 2022-05-08 | Stop reason: HOSPADM

## 2022-05-06 RX ORDER — HYDRALAZINE HYDROCHLORIDE 20 MG/ML
5 INJECTION INTRAMUSCULAR; INTRAVENOUS ONCE
Status: COMPLETED | OUTPATIENT
Start: 2022-05-06 | End: 2022-05-06

## 2022-05-06 RX ORDER — IPRATROPIUM BROMIDE AND ALBUTEROL SULFATE 2.5; .5 MG/3ML; MG/3ML
3 SOLUTION RESPIRATORY (INHALATION)
Status: DISCONTINUED | OUTPATIENT
Start: 2022-05-06 | End: 2022-05-06

## 2022-05-06 RX ORDER — ONDANSETRON 2 MG/ML
4 INJECTION INTRAMUSCULAR; INTRAVENOUS EVERY 6 HOURS PRN
Status: DISCONTINUED | OUTPATIENT
Start: 2022-05-06 | End: 2022-05-08 | Stop reason: HOSPADM

## 2022-05-06 RX ORDER — FERROUS SULFATE 325(65) MG
325 TABLET ORAL
Status: DISCONTINUED | OUTPATIENT
Start: 2022-05-06 | End: 2022-05-07

## 2022-05-06 RX ORDER — ALBUTEROL SULFATE 2.5 MG/3ML
2.5 SOLUTION RESPIRATORY (INHALATION) EVERY 4 HOURS PRN
Status: DISCONTINUED | OUTPATIENT
Start: 2022-05-06 | End: 2022-05-08 | Stop reason: HOSPADM

## 2022-05-06 RX ORDER — INSULIN LISPRO 100 [IU]/ML
1-6 INJECTION, SOLUTION INTRAVENOUS; SUBCUTANEOUS
Status: DISCONTINUED | OUTPATIENT
Start: 2022-05-06 | End: 2022-05-08 | Stop reason: HOSPADM

## 2022-05-06 RX ORDER — IPRATROPIUM BROMIDE AND ALBUTEROL SULFATE 2.5; .5 MG/3ML; MG/3ML
3 SOLUTION RESPIRATORY (INHALATION) 3 TIMES DAILY
Status: DISCONTINUED | OUTPATIENT
Start: 2022-05-06 | End: 2022-05-06

## 2022-05-06 RX ORDER — FLUTICASONE FUROATE AND VILANTEROL 100; 25 UG/1; UG/1
1 POWDER RESPIRATORY (INHALATION) DAILY
Status: DISCONTINUED | OUTPATIENT
Start: 2022-05-06 | End: 2022-05-08 | Stop reason: HOSPADM

## 2022-05-06 RX ORDER — PRAVASTATIN SODIUM 80 MG/1
80 TABLET ORAL
Status: DISCONTINUED | OUTPATIENT
Start: 2022-05-06 | End: 2022-05-08 | Stop reason: HOSPADM

## 2022-05-06 RX ORDER — FUROSEMIDE 40 MG/1
40 TABLET ORAL DAILY
Status: DISCONTINUED | OUTPATIENT
Start: 2022-05-06 | End: 2022-05-08 | Stop reason: HOSPADM

## 2022-05-06 RX ORDER — NITROGLYCERIN 0.4 MG/1
0.4 TABLET SUBLINGUAL
Status: DISCONTINUED | OUTPATIENT
Start: 2022-05-06 | End: 2022-05-08 | Stop reason: HOSPADM

## 2022-05-06 RX ORDER — CARVEDILOL 6.25 MG/1
6.25 TABLET ORAL 2 TIMES DAILY WITH MEALS
Status: DISCONTINUED | OUTPATIENT
Start: 2022-05-06 | End: 2022-05-08 | Stop reason: HOSPADM

## 2022-05-06 RX ADMIN — HYDRALAZINE HYDROCHLORIDE 5 MG: 20 INJECTION INTRAMUSCULAR; INTRAVENOUS at 11:59

## 2022-05-06 RX ADMIN — INSULIN HUMAN 45 UNITS: 500 INJECTION, SOLUTION SUBCUTANEOUS at 06:44

## 2022-05-06 RX ADMIN — INSULIN LISPRO 1 UNITS: 100 INJECTION, SOLUTION INTRAVENOUS; SUBCUTANEOUS at 22:01

## 2022-05-06 RX ADMIN — Medication 325 MG: at 09:38

## 2022-05-06 RX ADMIN — ENOXAPARIN SODIUM 40 MG: 40 INJECTION SUBCUTANEOUS at 09:38

## 2022-05-06 RX ADMIN — CARVEDILOL 6.25 MG: 6.25 TABLET, FILM COATED ORAL at 17:21

## 2022-05-06 RX ADMIN — FLUTICASONE FUROATE AND VILANTEROL TRIFENATATE 1 PUFF: 100; 25 POWDER RESPIRATORY (INHALATION) at 09:39

## 2022-05-06 RX ADMIN — FUROSEMIDE 40 MG: 40 TABLET ORAL at 09:38

## 2022-05-06 RX ADMIN — INSULIN LISPRO 5 UNITS: 100 INJECTION, SOLUTION INTRAVENOUS; SUBCUTANEOUS at 06:44

## 2022-05-06 RX ADMIN — PRAVASTATIN SODIUM 80 MG: 80 TABLET ORAL at 17:20

## 2022-05-06 RX ADMIN — NITROGLYCERIN 0.4 MG: 0.4 TABLET SUBLINGUAL at 01:50

## 2022-05-06 RX ADMIN — METOPROLOL TARTRATE 25 MG: 25 TABLET, FILM COATED ORAL at 12:00

## 2022-05-06 RX ADMIN — INSULIN LISPRO 3 UNITS: 100 INJECTION, SOLUTION INTRAVENOUS; SUBCUTANEOUS at 12:05

## 2022-05-06 NOTE — ASSESSMENT & PLAN NOTE
· S/p prior stenting in 2015  · Workup of chest pain as above with plan for stress echo  · Continue cardiac medications

## 2022-05-06 NOTE — ASSESSMENT & PLAN NOTE
· MIKKI 3 on admission  · Recurrence of right-sided chest heaviness prompting presentation, currently somewhat persistent with deep inspiration but improved following IV Toradol  · HStrop 12->17, EKG without acute ischemic change  · Suspect this may be atypical in setting of musculoskeletal versus underlying COPD, however given recurrent admissions for same along with reported cardiac history will follow-up final troponin and obtain stress ECHO

## 2022-05-06 NOTE — ASSESSMENT & PLAN NOTE
Wt Readings from Last 3 Encounters:   05/06/22 102 kg (225 lb 1 4 oz)   05/03/22 99 7 kg (219 lb 12 8 oz)   04/24/22 99 7 kg (219 lb 12 8 oz)     · Appears euvolemic at this time  · Continue home cardiac medications  · Monitor daily weights, I/O, volume status    5/6  -last echocardiogram in 2020 with preserved ejection fraction, will obtain new echocardiogram given recent admission for acute on chronic hypoxic respiratory failure and ongoing complain of chest pain

## 2022-05-06 NOTE — ASSESSMENT & PLAN NOTE
Lab Results   Component Value Date    HGBA1C 9 2 (H) 04/22/2022       Recent Labs     05/03/22  0557 05/03/22  1050 05/03/22  1627   POCGLU 211* 300* 180*       Blood Sugar Average: Last 72 hrs:  ·  with hyperglycemia on admission  · Continue home insulin regimen along with correctional insulin/Accu-Cheks while inpatient  · Carb controlled diet  · Initiate hypoglycemia protocol

## 2022-05-06 NOTE — UTILIZATION REVIEW
Initial Clinical Review    OBS 5/5 @ 7284 UPGRADED TO INPATIENT 5/6 @ 1027 FOR CONTINUED W/U and [address] PAIN    05/06/22 1555  Inpatient Admission  Once        Transfer Service: Hospitalist       Question Answer Comment   Level of Care Med Surg        05/06/22 1554     ED Arrival Information     Expected Arrival Acuity    - 5/5/2022 18:48 Urgent         Means of arrival Escorted by Service Admission type    Ambulance [facility] Hospitalist Urgent         Arrival complaint    Chest Pain         Chief Complaint   Patient presents with    Chest Pain     Pt states that chest pain and SOB started around 12 today, was admitted for similar symptoms on sunday       Initial Presentation: 78 y o  male with PMHx of CAD, CHF, severe COPD, adenocarcinoma of the lung, T2 DM presents to ED by EMS with chest pain  Pt has had 2 recent admissions at this hospital 1st with worsening chest pain and sob improved with nebulizers, IV Lasix, and steroids in setting of presumed COPD/CHF exacerbation with recurrent admission with worsening sob treated with nebulizers in conjunction with pulmonology, also endorsing some chest pain at that time but improved after therapy  Initially pt did well following d/c, however today he noted recurrence of his chest pain described as primarily localized to the right side of the chest, sharp but previously with tightness, worsened with inspiration, nonradiating, and not a/w exertion, n/v, or diaphoresis  In ED troponins wnl, EKG without acute ischemic change  Admit to M/S/Tele unit under observation with atypical chest pain --- tele monitoring  Continue pta meds  Date: 5/6   Day 2: Pt denies chest pain or sob on exam today  Pt on metoprolol, unable to perform stress echo today as he received beta-blockers in the hospital  Changed to nuclear stress test to be performed on May 7  D/c metoprolol, start coreg 6 25 mg BID  Continue supportive care      ED Triage Vitals Temperature Pulse Respirations Blood Pressure SpO2   05/05/22 1920 05/05/22 1852 05/05/22 1852 05/05/22 1852 05/05/22 1852   98 9 °F (37 2 °C) 105 22 165/80 99 %      Temp Source Heart Rate Source Patient Position - Orthostatic VS BP Location FiO2 (%)   05/05/22 1920 05/06/22 0031 05/06/22 0031 05/06/22 0031 --   Oral Monitor Lying Left arm       Pain Score       05/05/22 2244       7          Wt Readings from Last 1 Encounters:   05/06/22 102 kg (225 lb 1 4 oz)     Additional Vital Signs:   Date/Time Temp Pulse Resp BP MAP (mmHg) SpO2 Calculated FIO2 (%) - Nasal Cannula Nasal Cannula O2 Flow Rate (L/min) O2 Device   05/06/22 0728 97 5 °F (36 4 °C) 73 17 180/84 Abnormal  116 97 % -- -- --   05/06/22 03:43:43 97 5 °F (36 4 °C) 71 16 157/81 106 99 % 28 2 L/min Nasal cannula   05/06/22 03:43:10 -- 76 -- 157/81 106 99 % -- -- --   05/06/22 0031 -- 83 18 137/66 92 99 % 28 2 L/min Nasal cannula   05/05/22 1920 98 9 °F (37 2 °C) -- -- -- -- -- -- -- --   05/05/22 1856 -- -- -- -- -- -- -- -- Nasal cannula   05/05/22 1852 -- 105 22 165/80 -- 99 % 28 2 L/min Nasal cannula       Pertinent Labs/Diagnostic Test Results:   XR chest 2 views   ED Interpretation by Juilo Pearson DO (05/05 2211)   Chest x-ray interpreted me shows a chronic scarring of the right upper lobe, chronic right-sided pleural effusion versus atelectasis, no acute pathology, no acute change from May 1, 2022        EKG 5/5:  Age and gender specific ECG analysis   Atrial fibrillation  Right bundle branch block  Abnormal ECG  When compared with ECG of 01-MAY-2022 20:51,      Results from last 7 days   Lab Units 05/05/22  1919 05/02/22  0309 05/01/22  2116   WBC Thousand/uL 11 50* 9 27 7 05   HEMOGLOBIN g/dL 11 7* 12 2 11 9*   HEMATOCRIT % 38 1 40 9 38 8   PLATELETS Thousands/uL 142* 140* 140*   NEUTROS ABS Thousands/µL 9 76*  --  5 46   BANDS PCT %  --  4  --      Results from last 7 days   Lab Units 05/05/22  1919 05/02/22  0309 05/01/22 2116   SODIUM mmol/L 137 136 140   POTASSIUM mmol/L 4 6 4 4 4 3   CHLORIDE mmol/L 106 101 105   CO2 mmol/L 25 31 35*   ANION GAP mmol/L 6 4 0*   BUN mg/dL 26* 20 22   CREATININE mg/dL 1 15 1 21 1 12   EGFR ml/min/1 73sq m 62 58 64   CALCIUM mg/dL 8 3 8 5 8 5     Results from last 7 days   Lab Units 05/02/22  0309   AST U/L 16   ALT U/L 22   ALK PHOS U/L 90   TOTAL PROTEIN g/dL 7 4   ALBUMIN g/dL 3 0*   TOTAL BILIRUBIN mg/dL 0 21     Results from last 7 days   Lab Units 05/06/22  0612 05/03/22  1627 05/03/22  1050 05/03/22  0557 05/02/22  2042 05/02/22  1521 05/02/22  1039 05/02/22  0625 05/02/22  0230   POC GLUCOSE mg/dl 338* 180* 300* 211* 197* 178* 363* 376* 194*     Results from last 7 days   Lab Units 05/05/22 1919 05/02/22  0309 05/01/22  2116   GLUCOSE RANDOM mg/dL 243* 286* 226*     BETA-HYDROXYBUTYRATE   Date Value Ref Range Status   06/15/2019 0 2 <0 6 mmol/L Final      Results from last 7 days   Lab Units 05/05/22  2150 05/05/22 1919 05/02/22  0309 05/01/22  2328 05/01/22  2116   HS TNI 0HR ng/L  --  12  --   --  21   HS TNI 2HR ng/L 17  --   --  24  --    HSTNI D2 ng/L 5  --   --  3  --    HS TNI 4HR ng/L  --   --  20  --   --    HSTNI D4 ng/L  --   --  -1  --   --      Results from last 7 days   Lab Units 05/06/22  0150   D-DIMER QUANTITATIVE ug/ml FEU 0 56*     Results from last 7 days   Lab Units 05/02/22  0309   PROTIME seconds 11 8   INR  0 90   PTT seconds 26     Results from last 7 days   Lab Units 05/02/22  0309   TSH 3RD GENERATON uIU/mL 0 544     Results from last 7 days   Lab Units 05/02/22  0454   PROCALCITONIN ng/ml 0 10       Results from last 7 days   Lab Units 05/01/22  2116   NT-PRO BNP pg/mL 1,322*       Results from last 7 days   Lab Units 05/02/22  0457   STREP PNEUMONIAE ANTIGEN, URINE  Negative   LEGIONELLA URINARY ANTIGEN  Negative       Results from last 7 days   Lab Units 05/02/22  0458 05/02/22  0455   BLOOD CULTURE   --  No Growth at 72 hrs  No Growth at 72 hrs     SPUTUM CULTURE  3+ Growth of --    GRAM STAIN RESULT  1+ Epithelial cells per low power field*  No polys seen*  3+ Gram positive cocci in pairs and chains*  1+ Gram negative rods*  3+ Gram Positive Rods Resembling Diphtheroids*  3+ Gram positive cocci in clusters*  --          ED Treatment:   Medication Administration from 05/05/2022 1848 to 05/06/2022 0322       Date/Time Order Dose Route Action     05/05/2022 2244 ketorolac (TORADOL) injection 30 mg 30 mg Intravenous Given     05/05/2022 2305 aspirin chewable tablet 162 mg 162 mg Oral Given     05/06/2022 0150 nitroglycerin (NITROSTAT) SL tablet 0 4 mg 0 4 mg Sublingual Given     Past Medical History:   Diagnosis Date    Abdominal pain     [facility]ANDA [facility]) ([facility] ) 6/19/2018    Cardiac disease     CHF (congestive heart failure) (HCC)     COPD, severe (HCC)     Coronary artery disease     DDD (degenerative disc disease), lumbar 9/1/2020    [facility] )     Dyspnea     GERD (gastroesophageal reflux disease)     Hyperlipidemia     Hypertension     MI (myocardial infarction) ([facility] )     with 3 stents    Nodule of apex of right lung     JACQUELINE (obstructive sleep apnea)     Prostate cancer (HCC)      Present on Admission:   Atypical chest pain   COPD, severe (HCC)   Chronic diastolic heart failure (HCC)   CAD (coronary artery disease)   Adenocarcinoma of lung (HCC)      Admitting Diagnosis: Chest pain [R07 9]  Age/Sex: 78 y o  male  Admission Orders:  Scheduled Medications:  enoxaparin, 40 mg, Subcutaneous, Daily  ferrous sulfate, 325 mg, Oral, Daily With Breakfast  fluticasone-vilanterol, 1 puff, Inhalation, Daily  furosemide, 40 mg, Oral, Daily  insulin lispro, 1-5 Units, Subcutaneous, HS  insulin lispro, 1-6 Units, Subcutaneous, TID AC  insulin regular, 45 Units, Subcutaneous, BID AC  ipratropium-albuterol, 3 mL, Nebulization, TID  pravastatin, 80 mg, Oral, Daily With Dinner     PRN Meds:  nitroglycerin, 0 4 mg, Sublingual, Q5 Min PRN  ondansetron, 4 mg, Intravenous, Q6H PRN          Network Utilization Review Department  ATTENTION: Please call with any questions or concerns to [phone] and carefully listen to the prompts so that you are directed to the right person  All voicemails are confidential   Demetra Muss all requests for admission clinical reviews, approved or denied determinations and any other requests to dedicated fax number below belonging to the campus where the patient is receiving treatment   List of dedicated fax numbers for the Facilities:  [address] DENIALS (Administrative/Medical Necessity) [phone]   [address] (Maternity/NICU/Pediatrics) [phone]   [address] [place] [address]  [address] [facility] [place] [address] [address] [place] [address] [address] [facility] [place] [address] [phone]

## 2022-05-06 NOTE — ASSESSMENT & PLAN NOTE
Lab Results   Component Value Date    HGBA1C 9 2 (H) 04/22/2022       Recent Labs     05/03/22  1627 05/06/22  0612 05/06/22  1036   POCGLU 180* 338* 264*       Blood Sugar Average: Last 72 hrs:  · (P) 301 with hyperglycemia on admission  · Continue home insulin regimen along with correctional insulin/Accu-Cheks while inpatient  · Carb controlled diet  · Initiate hypoglycemia protocol

## 2022-05-06 NOTE — RESPIRATORY THERAPY NOTE
RT Protocol Note  Leyla Whitt Sr  78 y o  male MRN: [id number]  Unit/Bed#: ED 21 Encounter: [id number]    Assessment    Principal Problem:    Atypical chest pain  Active Problems:    Type 2 diabetes mellitus with hyperglycemia, with long-term current use of insulin (HCC)    COPD, severe (HCC)    Chronic diastolic heart failure (HCC)    CAD (coronary artery disease)    Adenocarcinoma of lung (HCC)      Home Pulmonary Medications:  Duonebs TID via nebulizer/Trelegy Ellipta 100-6 5-25 05/06/22 0254   Respiratory Protocol   Protocol Initiated? Yes   Protocol Selection Respiratory   Language Barrier? No   Medical & Social History Reviewed? Yes   Diagnostic Studies Reviewed? Yes   Physical Assessment Performed? Yes   Home Devices/Therapy Home O2;Other (Comment)  (Home nebulizer)   Respiratory Plan Home Bronchodilator Patient pathway   Respiratory Assessment   Assessment Type Assess only   General Appearance Sleeping   Respiratory Pattern Accessory muscle use   Chest Assessment Chest expansion symmetrical   Bilateral Breath Sounds Diminished   Cough None   Resp Comments Patient presents to ED with atypical chest pain  He does have a history of severe COPD with home oxygen and inhaled pulmonary medications  He is currently sleeping in bed, nasal cannula 2 L/min in place  He is ordered on his home pulmonary medications and is not in exacerbation at this time per hospitalist notes      O2 Device NC     Home Devices/Therapy: Home O2,Other (Comment) (Home nebulizer)    Past Medical History:   Diagnosis Date    Abdominal pain     MARANDA [facility]) ([facility] [address] ) 6/19/2018    Cardiac disease     CHF (congestive heart failure) (HCC)     COPD, severe (HCC)     Coronary artery disease     DDD (degenerative disc disease), lumbar 9/1/2020    Diabetes mellitus ([address] )     Dyspnea     GERD (gastroesophageal reflux disease)     Hyperlipidemia     Hypertension     MI (myocardial infarction) ([address] )     with 3 stents    Nodule of apex of right lung     JACQUELINE (obstructive sleep apnea)     Prostate cancer [facility])      Social History     Socioeconomic History    Marital status:      Spouse name: None    Number of children: 1    Years of education: 8    Highest education level: None   Occupational History    None   Tobacco Use    Smoking status: Former Smoker     Packs/day: 1 50     Years: 50 00     Pack years: 75 00     Start date: 38     Quit date: 2020     Years since quittin 8    Smokeless tobacco: Never Used   Vaping Use    Vaping Use: Never used   Substance and Sexual Activity    Alcohol use: Never    Drug use: No    Sexual activity: Never     Partners: Female   Other Topics Concern    None   Social History Narrative    Most recent tobacco use screenin2018    Do you currently or have you served in the [employer]: No    Were you activated, into active duty, as a member of the [employer] or as a Reservist: No    Caffeine intake: Moderate    Alcohol intake: None    Marital status:     Number of children: 1    Education: 8    Occupation: retired    Sexual orientation: Heterosexual    General stress level: Medium    Live alone or with others: with others    Exercise level: None    Sexually active: No    Overweight: Yes    Obese: Yes    Guns present in home: No    Seat belts used routinely: Yes    Advance directive: No    Sunscreen used routinely: No    Smoke alarm in home: Yes    Legally blind in one or both eyes: No    Hard of hearing or deaf in one or both ears: No     Social Determinants of Health     Financial Resource Strain: Not on file   Food Insecurity: No Food Insecurity    Worried About Running Out of Food in the Last Year: Never true    Kenyatta of Food in the Last Year: Never true   Transportation Needs: No Transportation Needs    Lack of Transportation (Medical): No    Lack of Transportation (Non-Medical):  No   Physical Activity: Not on file   Stress: Not on file Social Connections: Not on file   Intimate Partner Violence: Not on file   Housing Stability: [address] Unable to Pay for Housing in the Last Year: No    Number of [place] in the Last Year: 1    Unstable Housing in the Last Year: No       Subjective         Objective    Physical Exam:   Assessment Type: Assess only  General Appearance: Sleeping  Respiratory Pattern: Accessory muscle use  Chest Assessment: Chest expansion symmetrical  Bilateral Breath Sounds: Diminished  Cough: None  O2 Device: NC    Vitals:  Blood pressure 137/66, pulse 83, temperature 98 9 °F (37 2 °C), temperature source Oral, resp  rate 18, SpO2 99 %  Imaging and other studies: I have personally reviewed pertinent films in PACS with a Radiologist     O2 Device: NC     Plan    Respiratory Plan: Home Bronchodilator Patient pathway        Resp Comments: Patient presents to ED with atypical chest pain  He does have a history of severe COPD with home oxygen and inhaled pulmonary medications  He is currently sleeping in bed, nasal cannula 2 L/min in place  He is ordered on his home pulmonary medications and is not in exacerbation at this time per hospitalist notes

## 2022-05-06 NOTE — ASSESSMENT & PLAN NOTE
· MIKKI 3 on admission  · Recurrence of right-sided chest heaviness prompting presentation, currently somewhat persistent with deep inspiration but improved following IV Toradol  · HStrop 12->17, EKG without acute ischemic change  · Suspect this may be atypical in setting of musculoskeletal versus underlying COPD, however given recurrent admissions for same along with reported cardiac history will follow-up final troponin and obtain stress ECHO    5/6  -patient on metoprolol, unable to perform stress echo today as he received beta-blockers in the hospital   Changed to nuclear stress test to be performed on May 7

## 2022-05-06 NOTE — H&P
[address] [facility]  H&P- Ant Garcia Sr  1945, 78 y o  male MRN: [id number]  Unit/Bed#: ED 21 Encounter: [id number]  Primary Care Provider: Stephanie Tobin MD   Date and time admitted to hospital: 5/5/2022  6:48 PM    * Atypical chest pain  Assessment & Plan  · MIKKI 3 on admission  · Recurrence of right-sided chest heaviness prompting presentation, currently somewhat persistent with deep inspiration but improved following IV Toradol  · HStrop 12->17, EKG without acute ischemic change  · Suspect this may be atypical in setting of musculoskeletal versus underlying COPD, however given recurrent admissions for same along with reported cardiac history will follow-up final troponin and obtain stress ECHO    COPD, severe (HCC)  Assessment & Plan  · Not in acute exacerbation, continue home inhalers/nebulizers    Chronic diastolic heart failure (HCC)  Assessment & Plan  Wt Readings from Last 3 Encounters:   05/03/22 99 7 kg (219 lb 12 8 oz)   04/24/22 99 7 kg (219 lb 12 8 oz)   02/22/22 100 kg (220 lb 10 9 oz)     · Appears euvolemic at this time  · Continue home cardiac medications  · Monitor daily weights, I/O, volume status        Adenocarcinoma of lung (HCC)  Assessment & Plan  · History of adenocarcinoma of lung s/p resection  · Continue outpatient follow-up with pulmonology    CAD (coronary artery disease)  Assessment & Plan  · S/p prior stenting in 2015  · Workup of chest pain as above with plan for stress echo  · Continue cardiac medications    Type 2 diabetes mellitus with hyperglycemia, with long-term current use of insulin [facility])  Assessment & Plan  Lab Results   Component Value Date    HGBA1C 9 2 (H) 04/22/2022       Recent Labs     05/03/22  0557 05/03/22  1050 05/03/22  1627   POCGLU 211* 300* 180*       Blood Sugar Average: Last 72 hrs:  ·  with hyperglycemia on admission  · Continue home insulin regimen along with correctional insulin/Accu-Cheks while inpatient  · Carb controlled diet  · Initiate hypoglycemia protocol        VTE Prophylaxis: Enoxaparin (Lovenox)  / sequential compression device   Code Status:  Full code  POLST: POLST form is not discussed and not completed at this time  Anticipated Length of Stay:  Patient will be admitted on an Observation basis with an anticipated length of stay of  less than 2 midnights  Justification for Hospital Stay: Please see detailed plans noted above  Chief Complaint:     Chest pain  History of Present Illness:  Jossie Kowalski Sr  is a 78 y o  male who presented for evaluation of chest pain  Has had 2 recent admissions at this hospital 1st with worsening chest pain and shortness of breath improved with nebulizers, IV Lasix, and steroids in setting of presumed COPD/CHF exacerbation with recurrent admission with worsening shortness of breath treated with nebulizers in conjunction with pulmonology, also endorsing some chest pain at that time but improved after therapy  Initially patient did well following discharge, however on the presentation he noted recurrence of his chest pain described as primarily localized to the right side of the chest, sharp but previously with tightness, worsened with inspiration, nonradiating, and not associated with exertion, nausea/vomiting, or diaphoresis  He denies any fevers/chills, worsening shortness of Breath from baseline, cough/wheezing, weight gain, worsening edema, orthopnea/PND  During ED evaluation was noted with normal troponins and EKG without acute ischemic change, however given recurrent recurrence of his chest pain is admitted for further evaluation of this    Currently, patient is lying in bed in no acute distress comfortable appearing  Does note some slight residual chest pain particularly with palpation however is improved from initial presentation following IV Toradol provided during ED evaluation      Review of Systems:    Constitutional:  Denies fever or chills   Eyes: Denies change in visual acuity   HENT:  Denies nasal congestion or sore throat   Respiratory:  Denies cough or shortness of breath   Cardiovascular:  Denies edema but endorsed chest pain  GI:  Denies abdominal pain, nausea, vomiting, bloody stools or diarrhea   :  Denies dysuria   Musculoskeletal:  Denies back pain or joint pain   Integument:  Denies rash   Neurologic:  Denies headache, focal weakness or sensory changes   Endocrine:  Denies polyuria or polydipsia   Lymphatic:  Denies swollen glands   Psychiatric:  Denies depression or anxiety     Past Medical and Surgical History:   Past Medical History:   Diagnosis Date    Abdominal pain     [facility]D [facility]acute [facility]) ([facility] ) 6/19/2018    Cardiac disease     [facility]congest[facility] failure) ([facility])     COPD, severe (HCC)     Coronary artery disease     DDD (degenerative disc disease), lumbar 9/1/2020    Diabetes mellitus ([place] [address] )     Dyspnea     GERD (gastroesophageal reflux disease)     Hyperlipidemia     Hypertension     MI (myocardial infarction) ([place] [address] )     with 3 stents    Nodule of apex of right lung     JACQUELINE (obstructive sleep apnea)     Prostate cancer [facility])      Past Surgical History:   Procedure Laterality Date    ABDOMINAL SURGERY      exploratory    ANGIOPLASTY      3 stents    APPENDECTOMY      COLONOSCOPY  2015    CT NEEDLE BIOPSY LUNG  11/3/2020    ESOPHAGOGASTRODUODENOSCOPY N/A 10/2/2017    Procedure: ESOPHAGOGASTRODUODENOSCOPY (EGD); Surgeon: Abbey Richey MD;  [facility]: [facility]; Service: Gastroenterology    IR THORACENTESIS  11/3/2020    KNEE CARTILAGE SURGERY      OTHER SURGICAL HISTORY      stent placement    PROSTATE SURGERY      SKIN GRAFT      Basal cell CA back       Meds/Allergies:  No current facility-administered medications for this encounter      Current Outpatient Medications:     BD Insulin Syringe U-500 31G X 6MM 0 5 ML MISC, USE 1 SYRINGE THREE TIMES A DAY FOR INJECTING INSULIN, Disp: 100 each, Rfl: 5   cyclobenzaprine (FLEXERIL) 10 mg tablet, Take 1 tablet (10 mg total) by mouth 3 (three) times a day for 10 days, Disp: 30 tablet, Rfl: 0    ferrous sulfate 325 (65 Fe) mg tablet, Take 1 tablet (325 mg total) by mouth daily with breakfast, Disp: 30 tablet, Rfl: 0    fluticasone-umeclidinium-vilanterol (Trelegy Ellipta) 100-62 5-25 MCG/INH inhaler, Inhale 1 puff daily Rinse mouth after use , Disp: 60 each, Rfl: 3    fluticasone-umeclidinium-vilanterol (Trelegy Ellipta) 100-62 5-25 MCG/INH inhaler, Inhale 1 puff daily Rinse mouth after use , Disp: 60 blister, Rfl: 0    furosemide (LASIX) 40 mg tablet, Take 1 tablet (40 mg total) by mouth daily, Disp: 30 tablet, Rfl: 0    glucose blood (OneTouch Verio) test strip, May substitute brand based on insurance coverage  Check glucose QID , Disp: 100 each, Rfl: 0    HUMULIN R 500 UNIT/ML CONCENTRATED injection, INJECT 45 UNITS TWICE DAILY BEFORE BREAKFAST AND LUNCH, Disp: 40 mL, Rfl: 3    ipratropium-albuterol (DUO-NEB) 0 5-2 5 mg/3 mL nebulizer solution, Take 3 mL by nebulization 3 (three) times a day, Disp: 3 mL, Rfl: 0    metoprolol tartrate (LOPRESSOR) 25 mg tablet, TAKE ONE TABLET BY MOUTH TWICE A DAY, Disp: 60 tablet, Rfl: 5    potassium chloride (K-DUR,KLOR-CON) 20 mEq tablet, Take 1 tablet (20 mEq total) by mouth daily, Disp: 60 tablet, Rfl: 0    simvastatin (ZOCOR) 40 mg tablet, TAKE ONE TABLET BY MOUTH EVERY DAY, Disp: 60 tablet, Rfl: 2    Allergies: Allergies   Allergen Reactions    Crestor [Rosuvastatin] Other (See Comments)     Unknown      Metformin GI Intolerance     History:  Marital Status:       Substance Use History:   Social History     Substance and Sexual Activity   Alcohol Use Never     Social History     Tobacco Use   Smoking Status Former Smoker    Packs/day: 1 50    Years: 50 00    Pack years: 75 00    Start date: 38    Quit date: 2020    Years since quittin 8   Smokeless Tobacco Never Used     Social History Substance and Sexual Activity   Drug Use No       Family History:  Family History   Problem Relation Age of Onset    Heart disease Father     Other Father         Mesothelioma        Physical Exam:     Vitals:   Blood Pressure: 165/80 (05/05/22 1852)  Pulse: 105 (05/05/22 1852)  Temperature: 98 9 °F (37 2 °C) (05/05/22 1920)  Temp Source: Oral (05/05/22 1920)  Respirations: 22 (05/05/22 1852)  SpO2: 99 % (05/05/22 1852)    Constitutional:  Well developed, obese, no acute distress, non-toxic appearance   Eyes:  PERRL, conjunctiva normal   HENT:  Atraumatic, external ears normal, nose normal, oropharynx moist, no pharyngeal exudates  Neck- normal range of motion, no tenderness, supple   Respiratory:  No respiratory distress, normal breath sounds, no rales, no wheezing   Cardiovascular:  Normal rate, normal rhythm, no murmurs, no gallops, no rubs   GI:  Soft, nondistended, normal bowel sounds, nontender, no organomegaly, no mass, no rebound, no guarding   :  No costovertebral angle tenderness   Musculoskeletal:  No edema, right lower anterior chest wall mildly tender to palpation, no deformities  Back- no tenderness  Integument:  Well hydrated, chronic venous stasis dermatitis   Lymphatic:  No lymphadenopathy noted   Neurologic:  Alert &awake, communicative, CN 2-12 normal, normal motor function, normal sensory function, no focal deficits noted   Psychiatric:  Speech and behavior appropriate       Lab Results: I have personally reviewed pertinent reports        Results from last 7 days   Lab Units 05/05/22 1919   WBC Thousand/uL 11 50*   HEMOGLOBIN g/dL 11 7*   HEMATOCRIT % 38 1   PLATELETS Thousands/uL 142*   NEUTROS PCT % 86*   LYMPHS PCT % 8*   MONOS PCT % 6   EOS PCT % 0     Results from last 7 days   Lab Units 05/05/22 1919 05/02/22  0309 05/02/22  0309   POTASSIUM mmol/L 4 6   < > 4 4   CHLORIDE mmol/L 106   < > 101   CO2 mmol/L 25   < > 31   BUN mg/dL 26*   < > 20   CREATININE mg/dL 1 15   < > 1 21 CALCIUM mg/dL 8 3   < > 8 5   ALK PHOS U/L  --   --  90   ALT U/L  --   --  22   AST U/L  --   --  16    < > = values in this interval not displayed  Results from last 7 days   Lab Units 05/02/22  0309   INR  0 90       Imaging: I have personally reviewed pertinent reports  X-ray chest 1 view portable    Result Date: 4/22/2022  Narrative: CHEST INDICATION:   chest pain  COMPARISON:  3/29/2022 CT EXAM PERFORMED/VIEWS:  XR CHEST PORTABLE FINDINGS: Chronic right lung volume loss and scarring  Persistent right pleural effusion  Cardiomediastinal silhouette appears unremarkable  No pneumothorax Osseous structures appear within normal limits for patient age  Impression: Persistent chronic right lung volume loss and pleural effusion Workstation performed: [id number]     XR chest 2 views    Result Date: 5/2/2022  Narrative: CHEST INDICATION:   Chest pain, dyspnea  COMPARISON:  4/21/2022, CT chest, abdomen and pelvis 3/29/2022 EXAM PERFORMED/VIEWS:  XR CHEST AP & LATERAL Images: 3 FINDINGS:  The patient is rotated to the right  Cardiomediastinal silhouette appears stable  The right heart border is poorly defined  Linear atelectasis or scarring right upper lobe  Increased density right base likely a combination of pleural effusion and/or atelectasis, unchanged  The left lung is clear, noting emphysematous changes  Osseous structures appear within normal limits for patient age  Impression: Stable chest  Increased density right base likely a combination of pleural effusion and/or atelectasis, unchanged  Workstation performed: [id number]     CT chest wo contrast    Addendum Date: 5/2/2022 Addendum:   ADDENDUM: Airspace opacities within the left upper lobe which may represent infection  Recommend short-term follow-up chest CT scan in 3 months to evaluate for resolution      Result Date: 5/2/2022  Narrative: CT CHEST WITHOUT IV CONTRAST INDICATION:   Abnormal xray - lung opacity/opacities SOB, Hx Lung Cancer, R sided effsuion please evaluate if worsened from previous studies  COMPARISON:  March 29, 2022 TECHNIQUE: CT examination of the chest was performed without intravenous contrast   Axial, sagittal, and coronal 2D reformatted images were created from the source data and submitted for interpretation  Radiation dose length product (DLP) for this visit:  663 05 mGy-cm   This examination, like all CT scans performed in the [facility], was performed utilizing techniques to minimize radiation dose exposure, including the use of iterative  reconstruction and automated exposure control  FINDINGS: LUNGS:  Chronic right lung volume loss with linear scarring in the right upper lobe and lingula is again seen  Unchanged right basilar round atelectasis  Scattered airspace opacities within the left upper lobe are seen  Unchanged pulmonary emphysema is seen  PLEURA:  Right-sided pleural effusion is seen  HEART/GREAT VESSELS: Heavy atherosclerotic coronary artery calcification is noted  Heart is otherwise unremarkable  No thoracic aortic aneurysm  MEDIASTINUM AND JEREMÍAS:  Mediastinum is shifted to the right secondary to chronic lung volume loss CHEST WALL AND LOWER NECK:  Unremarkable  VISUALIZED STRUCTURES IN THE UPPER ABDOMEN:  Unremarkable  OSSEOUS STRUCTURES:  No acute fracture or destructive osseous lesion  Impression: Airspace opacities within the left upper lobe which may represent  Recommend short-term follow-up chest CT scan in 3 months to evaluate for resolution  The study was marked in [facility] for immediate notification  Workstation performed: [id number]         ** Please Note: Dragon 360 Dictation voice to text software was used in the creation of this document   **

## 2022-05-06 NOTE — RESPIRATORY THERAPY NOTE
RT Protocol Note  Catalina Severe Sr  78 y o  male MRN: [id number]  Unit/Bed#: [facility] 420-01 Encounter: [id number]    Assessment    Principal Problem:    Atypical chest pain  Active Problems:    Type 2 diabetes mellitus with hyperglycemia, with long-term current use of insulin (HCC)    COPD, severe (HCC)    Chronic diastolic heart failure (HCC)    CAD (coronary artery disease)    Adenocarcinoma of lung (HCC)      Home Pulmonary Medications:  trellegy    Home Devices/Therapy: Home O2,Other (Comment) (Home nebulizer)    Past Medical History:   Diagnosis Date    Abdominal pain     MARANDA [facility]acute [facility]) ([facility] [address] ) 2018    Cardiac disease     [facility]) [facility])     COPD, severe (HCC)     Coronary artery disease     DDD (degenerative disc disease), lumbar 2020    [facility]     Dyspnea     GERD (gastroesophageal reflux disease)     Hyperlipidemia     Hypertension     MI [facility]) ([facility] )     with 3 stents    Nodule of apex of right lung     JACQUELINE (obstructive sleep apnea)     Prostate cancer ([facility])      Social History     Socioeconomic History    Marital status:       Spouse name: None    Number of children: 1    Years of education: 8    Highest education level: None   Occupational History    None   Tobacco Use    Smoking status: Former Smoker     Packs/day: 1 50     Years: 50 00     Pack years: 75 00     Start date: 38     Quit date: 2020     Years since quittin 8    Smokeless tobacco: Never Used   Vaping Use    Vaping Use: Never used   Substance and Sexual Activity    Alcohol use: Never    Drug use: No    Sexual activity: Never     Partners: Female   Other Topics Concern    None   Social History Narrative    Most recent tobacco use screenin2018    Do you currently or have you served in the [employer]: No    Were you activated, into active duty, as a member of the [employer] or as a Reservist: No    Caffeine intake: Moderate    Alcohol intake: None    Marital status:     Number of children: 1    Education: 8    Occupation: retired    Sexual orientation: Heterosexual    General stress level: Medium    Live alone or with others: with others    Exercise level: None    Sexually active: No    Overweight: Yes    Obese: Yes    Guns present in home: No    Seat belts used routinely: Yes    Advance directive: No    Sunscreen used routinely: No    Smoke alarm in home: Yes    Legally blind in one or both eyes: No    Hard of hearing or deaf in one or both ears: No     Social Determinants of Health     Financial Resource Strain: Not on file   Food Insecurity: No Food Insecurity    Worried About Running Out of Food in the Last Year: Never true    Kenyatta of Food in the Last Year: Never true   Transportation Needs: No Transportation Needs    Lack of Transportation (Medical): No    Lack of Transportation (Non-Medical): No   Physical Activity: Not on file   Stress: Not on file   Social Connections: Not on file   Intimate Partner Violence: Not on file   Housing Stability: Low Risk     Unable to Pay for Housing in the Last Year: No    Number of Places Lived in the Last Year: 1    Unstable Housing in the Last Year: No       Subjective         Objective    Physical Exam:   Assessment Type: Assess only  General Appearance: Alert,Awake  Respiratory Pattern: Normal  Chest Assessment: Chest expansion symmetrical  Bilateral Breath Sounds: Diminished,Clear    Vitals:  Blood pressure 157/66, pulse 64, temperature 97 8 °F (36 6 °C), resp  rate 18, height 6' (1 829 m), weight 102 kg (225 lb 1 4 oz), SpO2 100 %  Imaging and other studies: I have personally reviewed pertinent reports  O2 Device: NC     Plan    Respiratory Plan: Home Bronchodilator Patient pathway        Resp Comments: Pt assessed at this time per resp protocol  Pt admitted for chest pain  He has a HX of COPD and uses 2L NC at home as needed   Pt currently has NC off with SpO2 WNL  Per chart pt takes trellegy at home as well as duoneb TID  Per pt he only uses albuterol udn prn at home  He states the he does not need scheduled bronchodilator therapy at this time  Per pt request pt will be scheduled on albuterol udn prn and scheduled udn will be D/C  Pt in no acute resp distress, SpO2 WNL, no wheezing heard  Will continue to monitor per resp protocol

## 2022-05-06 NOTE — PROGRESS NOTES
Pt brought down for Stress Echo and noted that pt will not be able to walk on treadmill secondary to WINTERS and leg weakness  Reviewed with Dr Yvette Whaley and will schedule pt for nuclear stress test over the weekend after confirming with his attending

## 2022-05-06 NOTE — ASSESSMENT & PLAN NOTE
Wt Readings from Last 3 Encounters:   05/03/22 99 7 kg (219 lb 12 8 oz)   04/24/22 99 7 kg (219 lb 12 8 oz)   02/22/22 100 kg (220 lb 10 9 oz)     · Appears euvolemic at this time  · Continue home cardiac medications  · Monitor daily weights, I/O, volume status

## 2022-05-07 ENCOUNTER — APPOINTMENT (INPATIENT)
Dept: NON INVASIVE DIAGNOSTICS | Facility: HOSPITAL | Age: 77
DRG: 313 | End: 2022-05-07
Payer: COMMERCIAL

## 2022-05-07 ENCOUNTER — APPOINTMENT (INPATIENT)
Dept: RADIOLOGY | Facility: HOSPITAL | Age: 77
DRG: 313 | End: 2022-05-07
Payer: COMMERCIAL

## 2022-05-07 LAB
BACTERIA BLD CULT: NORMAL
BACTERIA BLD CULT: NORMAL
GLUCOSE SERPL-MCNC: 244 MG/DL (ref 65–140)
GLUCOSE SERPL-MCNC: 348 MG/DL (ref 65–140)
GLUCOSE SERPL-MCNC: 356 MG/DL (ref 65–140)

## 2022-05-07 PROCEDURE — 93017 CV STRESS TEST TRACING ONLY: CPT

## 2022-05-07 PROCEDURE — 82948 REAGENT STRIP/BLOOD GLUCOSE: CPT

## 2022-05-07 PROCEDURE — G1004 CDSM NDSC: HCPCS

## 2022-05-07 PROCEDURE — 93018 CV STRESS TEST I&R ONLY: CPT | Performed by: INTERNAL MEDICINE

## 2022-05-07 PROCEDURE — 93016 CV STRESS TEST SUPVJ ONLY: CPT | Performed by: INTERNAL MEDICINE

## 2022-05-07 PROCEDURE — 99232 SBSQ HOSP IP/OBS MODERATE 35: CPT | Performed by: INTERNAL MEDICINE

## 2022-05-07 PROCEDURE — 78452 HT MUSCLE IMAGE SPECT MULT: CPT | Performed by: INTERNAL MEDICINE

## 2022-05-07 PROCEDURE — A9502 TC99M TETROFOSMIN: HCPCS

## 2022-05-07 PROCEDURE — 78452 HT MUSCLE IMAGE SPECT MULT: CPT

## 2022-05-07 PROCEDURE — 99222 1ST HOSP IP/OBS MODERATE 55: CPT | Performed by: INTERNAL MEDICINE

## 2022-05-07 RX ORDER — AMINOPHYLLINE DIHYDRATE 25 MG/ML
INJECTION, SOLUTION INTRAVENOUS
Status: DISCONTINUED
Start: 2022-05-07 | End: 2022-05-07 | Stop reason: WASHOUT

## 2022-05-07 RX ORDER — FERROUS SULFATE 325(65) MG
325 TABLET ORAL EVERY OTHER DAY
Status: DISCONTINUED | OUTPATIENT
Start: 2022-05-08 | End: 2022-05-08 | Stop reason: HOSPADM

## 2022-05-07 RX ADMIN — FLUTICASONE FUROATE AND VILANTEROL TRIFENATATE 1 PUFF: 100; 25 POWDER RESPIRATORY (INHALATION) at 15:55

## 2022-05-07 RX ADMIN — REGADENOSON 0.4 MG: 0.08 INJECTION, SOLUTION INTRAVENOUS at 12:56

## 2022-05-07 RX ADMIN — INSULIN LISPRO 4 UNITS: 100 INJECTION, SOLUTION INTRAVENOUS; SUBCUTANEOUS at 21:25

## 2022-05-07 RX ADMIN — PRAVASTATIN SODIUM 80 MG: 80 TABLET ORAL at 15:55

## 2022-05-07 RX ADMIN — INSULIN HUMAN 45 UNITS: 500 INJECTION, SOLUTION SUBCUTANEOUS at 18:32

## 2022-05-07 RX ADMIN — INSULIN LISPRO 5 UNITS: 100 INJECTION, SOLUTION INTRAVENOUS; SUBCUTANEOUS at 18:44

## 2022-05-07 RX ADMIN — CARVEDILOL 6.25 MG: 6.25 TABLET, FILM COATED ORAL at 15:55

## 2022-05-07 NOTE — UTILIZATION REVIEW
Continued Stay Review    Date: 5/7/2022                      Current Patient Class: inpatient  Current Level of Care: med/surg  HPI:76 y o  male initially admitted on 5/5 obs to inpatient 5/6 with atypical chest pain, sob   Assessment/Plan:  Pt denies cp or sob this AM  Weaned to room air  Continue po meds, inhalers  Plan for stress test this afternoon  Cardiology note: Pt has chest pain with typical and atypical features  He has constant pain  Bedside echo and a nuclear stress done which showed overall preserved LV function with some inferior hypokinesis  Troponins were negative x3  Feel it is safe to proceed with Odette Galindo to r/o significant ischemic burden  Unfortunately he is an active tobacco user  Will up titrate medical therapy await formal echo and stress test report  Addendum -- Myoview Nuclear stress test showed no ischemia or scar with preserved left ventricular systolic function       Vital Signs:   Date/Time Temp Pulse Resp BP MAP (mmHg) SpO2 Calculated FIO2 (%) - Nasal Cannula Nasal Cannula O2 Flow Rate (L/min) O2 Device   05/07/22 15:16:51 98 °F (36 7 °C) 89 21 141/77 98 95 % -- -- --   05/07/22 07:17:47 97 5 °F (36 4 °C) 69 18 141/62 88 99 % -- -- --   05/07/22 02:21:37 97 6 °F (36 4 °C) 66 14 173/64 Abnormal  100 100 % -- -- --   05/06/22 2300 97 4 °F (36 3 °C) Abnormal  67 -- 155/65 -- -- -- -- --   05/06/22 22:59:12 97 4 °F (36 3 °C) Abnormal  68 17 155/65 95 98 % -- -- --   05/06/22 2026 -- -- -- -- -- 99 % -- -- None (Room air)    05/06/22 19:12:25 97 6 °F (36 4 °C) 66 15 156/67 97 99 % -- -- None (Room air)   05/06/22 1713 -- 64 -- -- -- 100 % -- -- None (Room air)   05/06/22 15:26:37 97 8 °F (36 6 °C) 70 18 157/66 96 99 % -- -- --   05/06/22 1300 -- 65 -- 164/83 110 97 % -- -- --     Pertinent Labs/Diagnostic Results:      EKG 5/6:  Atrial fibrillation  Right bundle branch block  Left posterior fascicular block Bifascicular block   Abnormal ECG  When compared with ECG of 05-MAY-2022 18:59, (unconfirmed)  Atrial fibrillation has replaced Atrial flutter    Myoview stress test 5/7:  Interpretation Summary     Stress ECG: The ECG was not diagnostic due to pharmacological (vasodilator) stress      Perfusion: There is a left ventricular perfusion defect that is medium in size present in the basal to mid inferior location(s) that is paradoxical     Stress Function: Left ventricular function post-stress is normal  Post-stress ejection fraction is 61 %      Echo formal report pending      Results from last 7 days   Lab Units 05/05/22 1919 05/02/22 0309 05/01/22 2116   WBC Thousand/uL 11 50* 9 27 7 05   HEMOGLOBIN g/dL 11 7* 12 2 11 9*   HEMATOCRIT % 38 1 40 9 38 8   PLATELETS Thousands/uL 142* 140* 140*   NEUTROS ABS Thousands/µL 9 76*  --  5 46   BANDS PCT %  --  4  --      Results from last 7 days   Lab Units 05/05/22 1919 05/02/22 0309 05/01/22 2116   SODIUM mmol/L 137 136 140   POTASSIUM mmol/L 4 6 4 4 4 3   CHLORIDE mmol/L 106 101 105   CO2 mmol/L 25 31 35*   ANION GAP mmol/L 6 4 0*   BUN mg/dL 26* 20 22   CREATININE mg/dL 1 15 1 21 1 12   EGFR ml/min/1 73sq m 62 58 64   CALCIUM mg/dL 8 3 8 5 8 5     Results from last 7 days   Lab Units 05/07/22 0530 05/06/22 2027 05/06/22  1617 05/06/22  1036 05/06/22  0612 05/03/22  1627 05/03/22  1050 05/03/22  0557 05/02/22  2042 05/02/22  1521 05/02/22  1039 05/02/22  0625   POC GLUCOSE mg/dl 244* 209* 93 264* 338* 180* 300* 211* 197* 178* 363* 376*     Results from last 7 days   Lab Units 05/05/22 2150 05/05/22 1919 05/02/22 0309 05/01/22 2328 05/01/22 2116   HS TNI 0HR ng/L  --  12  --   --  21   HS TNI 2HR ng/L 17  --   --  24  --    HSTNI D2 ng/L 5  --   --  3  --    HS TNI 4HR ng/L  --   --  20  --   --    HSTNI D4 ng/L  --   --  -1  --   --      Results from last 7 days   Lab Units 05/06/22  0150   D-DIMER QUANTITATIVE ug/ml FEU 0 56*     Results from last 7 days   Lab Units 05/02/22  0457   STREP PNEUMONIAE ANTIGEN, URINE Negative   LEGIONELLA URINARY ANTIGEN  Negative     Results from last 7 days   Lab Units 05/02/22  0458 05/02/22  0455   BLOOD CULTURE   --  No Growth After 5 Days  No Growth After 5 Days  SPUTUM CULTURE  3+ Growth of   --    GRAM STAIN RESULT  1+ Epithelial cells per low power field*  No polys seen*  3+ Gram positive cocci in pairs and chains*  1+ Gram negative rods*  3+ Gram Positive Rods Resembling Diphtheroids*  3+ Gram positive cocci in clusters*  --        Medications:   Scheduled Medications:  carvedilol, 6 25 mg, Oral, BID With Meals  enoxaparin, 40 mg, Subcutaneous, Daily  [START ON 5/8/2022] ferrous sulfate, 325 mg, Oral, Every Other Day  fluticasone-vilanterol, 1 puff, Inhalation, Daily  furosemide, 40 mg, Oral, Daily  insulin lispro, 1-5 Units, Subcutaneous, HS  insulin lispro, 1-6 Units, Subcutaneous, TID AC  insulin regular, 45 Units, Subcutaneous, BID AC  pravastatin, 80 mg, Oral, Daily With Dinner  regadenoson     PRN Meds:  albuterol, 2 5 mg, Nebulization, Q4H PRN  nitroglycerin, 0 4 mg, Sublingual, Q5 Min PRN  ondansetron, 4 mg, Intravenous, Q6H PRN        Discharge Plan: D    Network Utilization Review Department  ATTENTION: Please call with any questions or concerns to [phone] and carefully listen to the prompts so that you are directed to the right person  All voicemails are confidential   [facility] all requests for admission clinical reviews, approved or denied determinations and any other requests to dedicated fax number below belonging to the campus where the patient is receiving treatment   List of dedicated fax numbers for the Facilities:  [address] DENIALS (Administrative/Medical Necessity) [phone]   [address] (Maternity/NICU/Pediatrics) [phone]   [address] [place] [address] - [address] [place] Joseram[address] [place] [address] [address] [place] [address] [address] [address] [place], [place] [address] [phone]

## 2022-05-07 NOTE — CONSULTS
Consultation - Cardiology Team One  Bertram Cruz  78 y o  male MRN: [id number]  Unit/Bed#: [facility] [place] 420-01 Encounter: [id number]    Consults    Physician Requesting Consult: Funmilayo Rodríguez DO  Reason for Consult / Principal Problem: chest pain      Assessment/ Plan:    1  Chest pain with hx of CAD: pt with recurrent chest pain for past weeks to months  Mid sternal chest pressure without radiation; occurs both at rest and with exertion  Not clear relief with nitro  Serial Hs trop negative x3  EKg showing RBBB  He has current pain at 7/10; bedside echo performed by attending in nuclear medicine showing preserved EF without wall motion abnormalities  OK to proceed with nuclear ST  Check full transthoracic echo  Not on ASA; unclear why; resume 81mg daily if no contraindications  Continue statin and BB  Further plan pending results of testing  2  HTN: BPs elevated at times; did not take meds this AM; see how it is after ST and can titrate meds if needed  3  HLD: on simvastatin; check lipid panel in AM  4  COPD: chronic home O2 2L  5  Chronic diastolic CHF: no clear s/s volume overlaod; continue home lasix 40mg daily  6  Tobacco use: trying to quit; last cigarette 3 days ago  7  DM Type II:  management per primary team  8  Hx of lung CA: s p resection  History of Present Illness      HPI: Gwendolyn Torres Sr  is a 78y o  year old male who has a history of CAD with prior stenting (D2 and RCA x2 prior to 2015), chronic diastolic CHF, COPD on chronic home O2 2L, tobacco use, hx of lung CA s/p resection, Dm Type II  He follows with cardiologist Dr Goldy Ellsworth; prior to that was [facility]; last [facility] in [facility] office 2020  Pt presented to ED yesterday 5/5/22 with complaints of chest pain  On presentation he was HS stable with /80; afebrile, SPO2 99% on his home dose O2 of 2L  EKG showed sinus rhythm with preciously seen RBBB  HS troponin was negative  He was admitted for further workup   Received Toradol in ED for pain as there was felt to be some component of MSK pain  Stress echo was ordered but he was unable to exercise on  Treadmill yesterday so Nuclear ST was ordered by primary team  While down in nuclear medicine he complaints of chest pain 10/10, I evaluated him down in nuclear medicine  He reported pain 7/10 at the time of my exam; mid sternal pressure like without radiation  Tells me pain has been going on for weeks to months; intermittent; can be at rest and with exertion; has taken nitroglycerin at home with minimal relief  No wt gain or LE edema  Carries past hx of CAD; prior stenting before 2015 to D2 and RCA x2; last cath 2015 showed 30% LAD stenosis and patent RCA and diag stents  Echo 2020 showed normal LVEF 68% without WMA, dilated RV with reduced systolic function  He has been a smoker; cutting back lately and trying to quit; last cigarette 3 days ago  He is not active at home  EKG reviewed personally:  5/7/2022  Sinus rhythm with previously seen RBBB    Review of Systems   Constitutional: Negative for decreased appetite and fever  Cardiovascular: Positive for chest pain  Negative for leg swelling, orthopnea, palpitations and syncope  Respiratory: Negative for cough and shortness of breath (at baseline)  Gastrointestinal: Negative for nausea and vomiting  Genitourinary: Negative for dysuria  Neurological: Negative for dizziness and light-headedness  Psychiatric/Behavioral: Negative for altered mental status  All other systems reviewed and are negative         Historical Information   Past Medical History:   Diagnosis Date    Abdominal pain     MARANDA [facility]acute kidney injury) ([facility] [address] ) 6/19/2018    Cardiac disease     CHF (congestive heart failure) (HCC)     COPD, severe (HCC)     Coronary artery disease     DDD (degenerative disc disease), lumbar 9/1/2020    Diabetes mellitus ([facility] [address] )     Dyspnea     GERD (gastroesophageal reflux disease)     Hyperlipidemia  Hypertension     MI (myocardial infarction) ([facility] [address] )     with 3 stents    Nodule of apex of right lung     JACQUELINE (obstructive sleep apnea)     Prostate cancer [facility])      Past Surgical History:   Procedure Laterality Date    ABDOMINAL SURGERY      exploratory    ANGIOPLASTY      3 stents    APPENDECTOMY      COLONOSCOPY      CT NEEDLE BIOPSY LUNG  11/3/2020    ESOPHAGOGASTRODUODENOSCOPY N/A 10/2/2017    Procedure: ESOPHAGOGASTRODUODENOSCOPY (EGD); Surgeon: Erika Michele MD;  Location:  GI LAB;   Service: Gastroenterology    IR THORACENTESIS  11/3/2020    KNEE CARTILAGE SURGERY      OTHER SURGICAL HISTORY      stent placement    PROSTATE SURGERY      SKIN GRAFT      Basal cell CA back     Social History     Substance and Sexual Activity   Alcohol Use Never     Social History     Substance and Sexual Activity   Drug Use No     Social History     Tobacco Use   Smoking Status Former Smoker    Packs/day: 1 50    Years: 50 00    Pack years: 75 00    Start date: 38    Quit date: 2020    Years since quittin 8   Smokeless Tobacco Never Used     Family History:   Family History   Problem Relation Age of Onset    Heart disease Father     Other Father         Mesothelioma      Meds/Allergies   current meds:   Current Facility-Administered Medications   Medication Dose Route Frequency    albuterol inhalation solution 2 5 mg  2 5 mg Nebulization Q4H PRN    aminophylline 25 mg/mL injection **ADS Override Pull**        carvedilol (COREG) tablet 6 25 mg  6 25 mg Oral BID With Meals    enoxaparin (LOVENOX) subcutaneous injection 40 mg  40 mg Subcutaneous Daily    [START ON 2022] ferrous sulfate tablet 325 mg  325 mg Oral Every Other Day    fluticasone-vilanterol (BREO ELLIPTA) 100-25 mcg/inh inhaler 1 puff  1 puff Inhalation Daily    furosemide (LASIX) tablet 40 mg  40 mg Oral Daily    insulin lispro (HumaLOG) 100 units/mL subcutaneous injection 1-5 Units  1-5 Units Subcutaneous HS    insulin lispro (HumaLOG) 100 units/mL subcutaneous injection 1-6 Units  1-6 Units Subcutaneous TID AC    insulin regular (HumuLIN R U-500) 500 units/mL CONCENTRATED injection 45 Units  45 Units Subcutaneous BID AC    nitroglycerin (NITROSTAT) SL tablet 0 4 mg  0 4 mg Sublingual Q5 Min PRN    ondansetron (ZOFRAN) injection 4 mg  4 mg Intravenous Q6H PRN    pravastatin (PRAVACHOL) tablet 80 mg  80 mg Oral Daily With Dinner    regadenoson (LEXISCAN) 0 4 mg/5 mL injection **ADS Override Pull**         Allergies   Allergen Reactions    Crestor [Rosuvastatin] Other (See Comments)     Unknown      Metformin GI Intolerance     Objective   Vitals: Blood pressure 141/62, pulse 69, temperature 97 5 °F (36 4 °C), resp  rate 18, height 6' (1 829 m), weight 102 kg (225 lb 1 4 oz), SpO2 99 %  ,     Body mass index is 30 53 kg/m²  ,     Systolic (85MBV), GND:701 , Min:141 , BWD:553     Diastolic (15MBF), RYT:27, Min:62, Max:83      Intake/Output Summary (Last 24 hours) at 5/7/2022 1159  Last data filed at 5/7/2022 1035  Gross per 24 hour   Intake 480 ml   Output 3750 ml   Net -3270 ml     Weight (last 2 days)     Date/Time Weight    05/06/22 0340 102 (225 09)        Invasive Devices  Report    Peripheral Intravenous Line            Peripheral IV 05/05/22 Right Wrist 1 day              Physical Exam  Vitals reviewed  Constitutional:       Appearance: Normal appearance  Comments: Pt sitting up in stretcher in NAD; alert and cooperative   HENT:      Head: Normocephalic  Mouth/Throat:      Mouth: Mucous membranes are moist    Cardiovascular:      Rate and Rhythm: Normal rate and regular rhythm  Heart sounds: S1 normal and S2 normal  No murmur heard  Comments: Trace ankle edema  Pulmonary:      Effort: Pulmonary effort is normal       Breath sounds: Normal breath sounds  No wheezing or rales  Comments: On o2 2L NC  Abdominal:      Palpations: Abdomen is soft  Skin:     General: Skin is warm  Neurological:      Mental Status: He is alert and oriented to person, place, and time     Psychiatric:         Mood and Affect: Mood normal        LABORATORY RESULTS:      CBC with diff:   Results from last 7 days   Lab Units 05/05/22 1919 05/02/22  0309 05/01/22  2116   WBC Thousand/uL 11 50* 9 27 7 05   HEMOGLOBIN g/dL 11 7* 12 2 11 9*   HEMATOCRIT % 38 1 40 9 38 8   MCV fL 82 84 83   PLATELETS Thousands/uL 142* 140* 140*   MCH pg 25 2* 25 1* 25 6*   MCHC g/dL 30 7* 29 8* 30 7*   RDW % 16 0* 15 9* 15 9*   MPV fL 10 5 10 4 11 0   NRBC AUTO /100 WBCs 0  --  0     CMP:  Results from last 7 days   Lab Units 05/05/22 1919 05/02/22 0309 05/01/22 2116   POTASSIUM mmol/L 4 6 4 4 4 3   CHLORIDE mmol/L 106 101 105   CO2 mmol/L 25 31 35*   BUN mg/dL 26* 20 22   CREATININE mg/dL 1 15 1 21 1 12   CALCIUM mg/dL 8 3 8 5 8 5   AST U/L  --  16  --    ALT U/L  --  22  --    ALK PHOS U/L  --  90  --    EGFR ml/min/1 73sq m 62 58 64     BMP:  Results from last 7 days   Lab Units 05/05/22 1919 05/02/22 0309 05/01/22 2116   POTASSIUM mmol/L 4 6 4 4 4 3   CHLORIDE mmol/L 106 101 105   CO2 mmol/L 25 31 35*   BUN mg/dL 26* 20 22   CREATININE mg/dL 1 15 1 21 1 12   CALCIUM mg/dL 8 3 8 5 8 5     Lab Results   Component Value Date    NTBNP 1,322 (H) 05/01/2022    NTBNP 951 (H) 04/21/2022    NTBNP 1,960 (H) 02/21/2022     Results from last 7 days   Lab Units 05/02/22  0309   TSH 3RD GENERATON uIU/mL 0 544     Results from last 7 days   Lab Units 05/02/22  0309   INR  0 90     Lipid Profile:   Lab Results   Component Value Date    CHOL 152 09/10/2015    CHOL 164 03/23/2015     Lab Results   Component Value Date    HDL 40 03/10/2020    HDL 52 12/09/2019    HDL 50 08/27/2019     Lab Results   Component Value Date    LDLCALC 74 03/10/2020    LDLCALC 60 12/09/2019    LDLCALC 64 08/27/2019     Lab Results   Component Value Date    TRIG 216 (H) 03/10/2020    TRIG 184 (H) 12/09/2019    TRIG 122 08/27/2019     Cardiac testing:   Results for orders placed during the hospital encounter of 20    Echo complete with contrast if indicated    Narrative  [address]  [facility], [address] [facility]  [phone]    Transthoracic Echocardiogram  2D, Doppler, and Color Doppler    Study date:  2020    Patient: Justus Calabrese  MR number: [id number]  Account number: [de-identified]  : 1945  Age: 69 years  Gender: Male  Status: Inpatient  Location: Bedside  Height: 72 in  Weight: 265 lb  BP: 158/ 83 mmHg    Indications: Heart Failure    Diagnoses: I50 9 - Heart failure, unspecified    Sonographer:  JOLIE Duong  Primary Physician:  Renea Pacheco MD  Referring Physician: YOLANDE Singh  Group:  [facility]  Cardiology Fellow:  David Lopez DO  Interpreting Physician:  Ras Jensen MD    SUMMARY    PROCEDURE INFORMATION:  This was a technically difficult study  Cardiac structures were not all visualized  Intravenous contrast (  4ml Definity in NSS) was administered to opacify the left ventricle  LEFT VENTRICLE:  Systolic function was normal  Ejection fraction was estimated to be 68 %  There were no regional wall motion abnormalities  Wall thickness was increased  Concentric hypertrophy was present  Doppler parameters were consistent with abnormal left ventricular relaxation (grade 1 diastolic dysfunction)  RIGHT VENTRICLE:  The ventricle was dilated  Systolic function was reduced  LEFT ATRIUM:  The atrium was dilated  HISTORY: PRIOR HISTORY: GERD,Pleural effusion,Obesity,DM2,CKD3,MI,COPD,SOB,CHF,CAD,CP,RBBB,JACQUELINE,HTN,HLD    PROCEDURE: The procedure was performed at the bedside  This was a routine study  The transthoracic approach was used  The study included complete 2D imaging, complete spectral Doppler, and color Doppler  The heart rate was 92 bpm, at the  start of the study   Intravenous contrast (  4ml Definity in NSS) was administered to opacify the left ventricle  Echocardiographic views were limited due to poor acoustic window availability, decreased penetration, and lung interference  No M-mode measurements were possible  This was a technically difficult study  Cardiac structures were not all visualized  LEFT VENTRICLE: Size was normal  Systolic function was normal  Ejection fraction was estimated to be 68 %  There were no regional wall motion abnormalities  Wall thickness was increased  Concentric hypertrophy was present  DOPPLER: Doppler  parameters were consistent with abnormal left ventricular relaxation (grade 1 diastolic dysfunction)  RIGHT VENTRICLE: The ventricle was dilated  Systolic function was reduced  LEFT ATRIUM: The atrium was dilated  MITRAL VALVE: Valve structure was normal  There was normal leaflet separation  DOPPLER: The transmitral velocity was within the normal range  There was no evidence for stenosis  There was no regurgitation  AORTIC VALVE: The valve was trileaflet  Leaflets exhibited normal cuspal separation and sclerosis  DOPPLER: Transaortic velocity was within the normal range  There was no evidence for stenosis  There was no regurgitation  PERICARDIUM: There was no pericardial effusion  The pericardium was normal in appearance  AORTA: The root exhibited normal size  SYSTEM MEASUREMENT TABLES    PW  E': 0 08 m/s  E/E': 9 45  MV A Jsoe: 1 04 m/s  MV Dec [place]: 4 07 m/s2  MV DecT: 195 86 ms  MV E Jose: 0 8 m/s  MV E/A Ratio: 0 76  MV PHT: 56 8 ms  MVA By PHT: 3 87 cm2    [employer] Accredited Echocardiography Laboratory    Prepared and electronically signed by    Nigel Jack MD  Signed 30-Jul-2020 13:42:14    Imaging: I have personally reviewed pertinent reports  X-ray chest 1 view portable    Result Date: 4/22/2022  Narrative: CHEST INDICATION:   chest pain   COMPARISON:  3/29/2022 CT EXAM PERFORMED/VIEWS:  XR CHEST PORTABLE FINDINGS: Chronic right lung volume loss and scarring  Persistent right pleural effusion  Cardiomediastinal silhouette appears unremarkable  No pneumothorax Osseous structures appear within normal limits for patient age  Impression: Persistent chronic right lung volume loss and pleural effusion Workstation performed: [id number]     XR chest 2 views    Result Date: 5/6/2022  Narrative: CHEST INDICATION:   R chest pain, rales  COMPARISON:  Chest x-ray 5/1/2022, CT chest 5/2/2022 EXAM PERFORMED/VIEWS:  XR CHEST PA & LATERAL FINDINGS: Cardiomediastinal silhouette appears unremarkable  Stable volume loss on the right  There is right apical scarring  Increased hazy density in the right lower lung zone, stable may represent pleural parenchymal changes and possible small effusion  Left lung is clear  Recent chest CT noted airspace opacities in the left upper lobe, not appreciated on this chest x-ray exam  Osseous structures appear within normal limits for patient age  Impression: 1  No acute pulmonary process  Recent chest CT noted airspace opacities in the left upper lobe, not appreciated on this chest x-ray exam  2   Chronic pleural parenchymal changes in the right lower lung zone with possible small effusion  Workstation performed: [id number]     XR chest 2 views    Result Date: 5/2/2022  Narrative: CHEST INDICATION:   Chest pain, dyspnea  COMPARISON:  4/21/2022, CT chest, abdomen and pelvis 3/29/2022 EXAM PERFORMED/VIEWS:  XR CHEST AP & LATERAL Images: 3 FINDINGS:  The patient is rotated to the right  Cardiomediastinal silhouette appears stable  The right heart border is poorly defined  Linear atelectasis or scarring right upper lobe  Increased density right base likely a combination of pleural effusion and/or atelectasis, unchanged  The left lung is clear, noting emphysematous changes  Osseous structures appear within normal limits for patient age       Impression: Stable chest  Increased density right base likely a combination of pleural effusion and/or atelectasis, unchanged  Workstation performed: [id number]     CT chest wo contrast    Addendum Date: 5/2/2022 Addendum:   ADDENDUM: Airspace opacities within the left upper lobe which may represent infection  Recommend short-term follow-up chest CT scan in 3 months to evaluate for resolution  Result Date: 5/2/2022  Narrative: CT CHEST WITHOUT IV CONTRAST INDICATION:   Abnormal xray - lung opacity/opacities SOB, Hx Lung Cancer, R sided effsuion please evaluate if worsened from previous studies  COMPARISON:  March 29, 2022 TECHNIQUE: CT examination of the chest was performed without intravenous contrast   Axial, sagittal, and coronal 2D reformatted images were created from the source data and submitted for interpretation  Radiation dose length product (DLP) for this visit:  663 05 mGy-cm   This examination, like all CT scans performed in the [facility], was performed utilizing techniques to minimize radiation dose exposure, including the use of iterative  reconstruction and automated exposure control  FINDINGS: LUNGS:  Chronic right lung volume loss with linear scarring in the right upper lobe and lingula is again seen  Unchanged right basilar round atelectasis  Scattered airspace opacities within the left upper lobe are seen  Unchanged pulmonary emphysema is seen  PLEURA:  Right-sided pleural effusion is seen  HEART/GREAT VESSELS: Heavy atherosclerotic coronary artery calcification is noted  Heart is otherwise unremarkable  No thoracic aortic aneurysm  MEDIASTINUM AND JEREMÍAS:  Mediastinum is shifted to the right secondary to chronic lung volume loss CHEST WALL AND LOWER NECK:  Unremarkable  VISUALIZED STRUCTURES IN THE UPPER ABDOMEN:  Unremarkable  OSSEOUS STRUCTURES:  No acute fracture or destructive osseous lesion  Impression: Airspace opacities within the left upper lobe which may represent    Recommend short-term follow-up chest CT scan in 3 months to evaluate for resolution  The study was marked in [facility] for immediate notification  Workstation performed: [id number]     Assessment  Principal Problem:    Atypical chest pain  Active Problems:    Type 2 diabetes mellitus with hyperglycemia, with long-term current use of insulin (HCC)    COPD, severe (HCC)    Chronic diastolic heart failure (HCC)    CAD (coronary artery disease)    Adenocarcinoma of lung ([facility] [address] )    Thank you for allowing us to participate in this patient's care  Counseling / Coordination of Care  Total floor / unit time spent today 45 minutes  Greater than 50% of total time was spent with the patient and / or family counseling and / or coordination of care  A description of the counseling / coordination of care: Review of history, current assessment, development of a plan  Code Status: Level 1 - Full Code    ** Please Note: Dragon 360 Dictation voice to text software may have been used in the creation of this document   **

## 2022-05-07 NOTE — PROGRESS NOTES
[address] [facility]  Progress Note - Tho Spears Sr  1945, 78 y o  male MRN: [id number]  Unit/Bed#: [facility] 420-01 Encounter: [id number]  Primary Care Provider: Renea Pacheco MD   Date and time admitted to hospital: 5/5/2022  6:48 PM    Adenocarcinoma of lung [facility])  Assessment & Plan  · History of adenocarcinoma of lung s/p resection  · Continue outpatient follow-up with pulmonology    CAD (coronary artery disease)  Assessment & Plan  · S/p prior stenting in 2015  · Workup of chest pain as above with plan for stress echo  · Continue cardiac medications  · Patient denies chest pain this morning    Chronic diastolic heart failure ([facility] )  Assessment & Plan  Wt Readings from Last 3 Encounters:   05/06/22 102 kg (225 lb 1 4 oz)   05/03/22 99 7 kg (219 lb 12 8 oz)   04/24/22 99 7 kg (219 lb 12 8 oz)     · Appears euvolemic at this time  · Continue home cardiac medications  · Monitor daily weights, I/O, volume status    5/6  -last echocardiogram in 2020 with preserved ejection fraction, will obtain new echocardiogram given recent admission for acute on chronic hypoxic respiratory failure and ongoing complain of chest pain    5/7/22:  Patient pending stress test this afternoon  This morning on my evaluation denies any recurrence of his feet overnight with this morning    He is currently comfortable at rest he denies any shortness of breath    -continue daily Lasix  -monitor in's an out's    COPD, severe ([facility] )  Assessment & Plan  · Not in acute exacerbation, continue home inhalers/nebulizers  · Comfortable on 2lnc    Type 2 diabetes mellitus with hyperglycemia, with long-term current use of insulin [facility])  Assessment & Plan  Lab Results   Component Value Date    HGBA1C 9 2 (H) 04/22/2022       Recent Labs     05/06/22  1036 05/06/22  1617 05/06/22 2027 05/07/22  0530   POCGLU 264* 93 209* 244*       Blood Sugar Average: Last 72 hrs:  · (P) 229 6 with hyperglycemia on admission  · Continue home insulin regimen along with correctional insulin/Accu-Cheks while inpatient  · Carb controlled diet  · Initiate hypoglycemia protocol  ·     * Atypical chest pain  Assessment & Plan  · MIKKI 3 on admission  · Recurrence of right-sided chest heaviness prompting presentation, currently somewhat persistent with deep inspiration but improved following IV Toradol  · HStrop 12->17, EKG without acute ischemic change  · Suspect this may be atypical in setting of musculoskeletal versus underlying COPD, however given recurrent admissions for same along with reported cardiac history will follow-up final troponin and obtain stress ECHO    5/7/22:  Pending nuclear stress test this afternoon  Currently denies chest pain  Further management based on results  -in terms of his presentation he reports several weeks of intermittent chest pressure in the substernal area without any radiation  He states the pain comes on with exertion but also occurs at rest as well  Denies any nausea diaphoresis radiation of the pain lightheadedness dizziness palpitations lower extremity swelling shortness of breath  VTE Pharmacologic Prophylaxis: VTE Score: 4 Moderate Risk (Score 3-4) - Pharmacological DVT Prophylaxis Ordered: enoxaparin (Lovenox)  Patient Centered Rounds: I performed bedside rounds with nursing staff today  Discussions with Specialists or Other Care Team Provider: n/a    Education and Discussions with Family / Patient: Attempted to update  (son) via phone  Unable to contact  Time Spent for Care: 45 minutes  More than 50% of total time spent on counseling and coordination of care as described above  Current Length of Stay: 1 day(s)  Current Patient Status: Inpatient   Certification Statement: The patient will continue to require additional inpatient hospital stay due to Pending stress test  Discharge Plan: Anticipate discharge tomorrow to home      Code Status: Level 1 - Full Code    Subjective: Patient denies any chest pain this morning, denies shortness of breath fever chills nausea vomiting abdominal pain  No acute events overnight    Objective:     Vitals:   Temp (24hrs), Av 6 °F (36 4 °C), Min:97 4 °F (36 3 °C), Max:97 8 °F (36 6 °C)    Temp:  [97 4 °F (36 3 °C)-97 8 °F (36 6 °C)] 97 5 °F (36 4 °C)  HR:  [64-79] 69  Resp:  [14-18] 18  BP: (141-173)/(62-83) 141/62  SpO2:  [95 %-100 %] 99 %  Body mass index is 30 53 kg/m²  Input and Output Summary (last 24 hours): Intake/Output Summary (Last 24 hours) at 2022 1214  Last data filed at 2022 1035  Gross per 24 hour   Intake 480 ml   Output 3400 ml   Net -2920 ml       Physical Exam:   Physical Exam  Vitals and nursing note reviewed  Constitutional:       General: He is not in acute distress  Appearance: He is well-developed  He is not ill-appearing, toxic-appearing or diaphoretic  HENT:      Head: Normocephalic and atraumatic  Eyes:      General: No scleral icterus  Conjunctiva/sclera: Conjunctivae normal    Cardiovascular:      Rate and Rhythm: Normal rate and regular rhythm  Heart sounds: No murmur heard  No friction rub  No gallop  Pulmonary:      Effort: Pulmonary effort is normal  No respiratory distress  Breath sounds: Normal breath sounds  No stridor  No wheezing, rhonchi or rales  Comments: 2lnc  Decreased breath sounds bilaterally  Chest:      Chest wall: No tenderness  Abdominal:      General: There is no distension  Palpations: Abdomen is soft  There is no mass  Tenderness: There is no abdominal tenderness  There is no guarding or rebound  Hernia: No hernia is present  Musculoskeletal:      Cervical back: Neck supple  Right lower leg: Edema present  Left lower leg: Edema present  Skin:     General: Skin is warm and dry  Neurological:      Mental Status: He is alert and oriented to person, place, and time            Additional Data:     Labs:  Results from last 7 days   Lab Units 05/05/22 1919 05/02/22 0309 05/02/22  0309   WBC Thousand/uL 11 50*   < > 9 27   HEMOGLOBIN g/dL 11 7*   < > 12 2   HEMATOCRIT % 38 1   < > 40 9   PLATELETS Thousands/uL 142*   < > 140*   BANDS PCT %  --   --  4   NEUTROS PCT % 86*  --   --    LYMPHS PCT % 8*  --   --    LYMPHO PCT %  --   --  0*   MONOS PCT % 6  --   --    MONO PCT %  --   --  0*   EOS PCT % 0  --  0    < > = values in this interval not displayed  Results from last 7 days   Lab Units 05/05/22 1919 05/02/22 0309 05/02/22  0309   SODIUM mmol/L 137   < > 136   POTASSIUM mmol/L 4 6   < > 4 4   CHLORIDE mmol/L 106   < > 101   CO2 mmol/L 25   < > 31   BUN mg/dL 26*   < > 20   CREATININE mg/dL 1 15   < > 1 21   ANION GAP mmol/L 6   < > 4   CALCIUM mg/dL 8 3   < > 8 5   ALBUMIN g/dL  --   --  3 0*   TOTAL BILIRUBIN mg/dL  --   --  0 21   ALK PHOS U/L  --   --  90   ALT U/L  --   --  22   AST U/L  --   --  16   GLUCOSE RANDOM mg/dL 243*   < > 286*    < > = values in this interval not displayed  Results from last 7 days   Lab Units 05/02/22  0309   INR  0 90     Results from last 7 days   Lab Units 05/07/22  0530 05/06/22  2027 05/06/22  1617 05/06/22  1036 05/06/22  0612 05/03/22  1627 05/03/22  1050 05/03/22  0557 05/02/22  2042 05/02/22  1521 05/02/22  1039 05/02/22  0625   POC GLUCOSE mg/dl 244* 209* 93 264* 338* 180* 300* 211* 197* 178* 363* 376*         Results from last 7 days   Lab Units 05/02/22  0454   PROCALCITONIN ng/ml 0 10       Lines/Drains:  Invasive Devices  Report    Peripheral Intravenous Line            Peripheral IV 05/05/22 Right Wrist 1 day                      Imaging: No pertinent imaging reviewed  Recent Cultures (last 7 days):   Results from last 7 days   Lab Units 05/02/22  0458 05/02/22  0457 05/02/22  0455   BLOOD CULTURE   --   --  No Growth After 5 Days  No Growth After 5 Days     SPUTUM CULTURE  3+ Growth of   --   --    GRAM STAIN RESULT  1+ Epithelial cells per low power field*  No polys seen*  3+ Gram positive cocci in pairs and chains*  1+ Gram negative rods*  3+ Gram Positive Rods Resembling Diphtheroids*  3+ Gram positive cocci in clusters*  --   --    LEGIONELLA URINARY ANTIGEN   --  Negative  --        Last 24 Hours Medication List:   Current Facility-Administered Medications   Medication Dose Route Frequency Provider Last Rate    albuterol  2 5 mg Nebulization Q4H PRN Mabel Mckeon MD      aminophylline           carvedilol  6 25 mg Oral BID With Meals Mabel Mckeon MD      enoxaparin  40 mg Subcutaneous Daily Scout Rollins DO      [START ON 5/8/2022] ferrous sulfate  325 mg Oral Every Other Day Deyvi Rosario DO      fluticasone-vilanterol  1 puff Inhalation Daily Scout Rollins DO      furosemide  40 mg Oral Daily Scout Rollins DO      insulin lispro  1-5 Units Subcutaneous HS Scout Rollins DO      insulin lispro  1-6 Units Subcutaneous TID AC Scout Rollins DO      insulin regular  45 Units Subcutaneous BID AC Scout Rollins DO      nitroglycerin  0 4 mg Sublingual Q5 Min PRN Scout Rollins DO      ondansetron  4 mg Intravenous Q6H PRN Scout Rollins DO      pravastatin  80 mg Oral Daily With Michael Call DO      regadenoson               Today, Patient Was Seen By: Simon Hammonds DO    **Please Note: This note may have been constructed using a voice recognition system  **

## 2022-05-07 NOTE — ASSESSMENT & PLAN NOTE
Wt Readings from Last 3 Encounters:   05/06/22 102 kg (225 lb 1 4 oz)   05/03/22 99 7 kg (219 lb 12 8 oz)   04/24/22 99 7 kg (219 lb 12 8 oz)     · Appears euvolemic at this time  · Continue home cardiac medications  · Monitor daily weights, I/O, volume status    5/6  -last echocardiogram in 2020 with preserved ejection fraction, will obtain new echocardiogram given recent admission for acute on chronic hypoxic respiratory failure and ongoing complain of chest pain    5/7/22:  Patient pending stress test this afternoon  This morning on my evaluation denies any recurrence of his feet overnight with this morning    He is currently comfortable at rest he denies any shortness of breath    -continue daily Lasix  -monitor in's an out's

## 2022-05-07 NOTE — ASSESSMENT & PLAN NOTE
· S/p prior stenting in 2015  · Workup of chest pain as above with plan for stress echo  · Continue cardiac medications  · Patient denies chest pain this morning

## 2022-05-07 NOTE — ASSESSMENT & PLAN NOTE
· MIKKI 3 on admission  · Recurrence of right-sided chest heaviness prompting presentation, currently somewhat persistent with deep inspiration but improved following IV Toradol  · HStrop 12->17, EKG without acute ischemic change  · Suspect this may be atypical in setting of musculoskeletal versus underlying COPD, however given recurrent admissions for same along with reported cardiac history will follow-up final troponin and obtain stress ECHO    5/7/22:  Pending nuclear stress test this afternoon  Currently denies chest pain  Further management based on results  -in terms of his presentation he reports several weeks of intermittent chest pressure in the substernal area without any radiation  He states the pain comes on with exertion but also occurs at rest as well  Denies any nausea diaphoresis radiation of the pain lightheadedness dizziness palpitations lower extremity swelling shortness of breath

## 2022-05-07 NOTE — PROGRESS NOTES
Patient refused to take all medications prior to his stress test   Reporting he did want any medications to effect the test occurrence or results  Patient was educated that current scheduled medications will not effect the occurrence or the result of the test   It was also shared with the patient that the rounding practitioner from the cardiology team advised that all medications were allowed to be given   Patient continued to refuse medications at this time and reported that he will take medications when he returns from stress test

## 2022-05-07 NOTE — ASSESSMENT & PLAN NOTE
Lab Results   Component Value Date    HGBA1C 9 2 (H) 04/22/2022       Recent Labs     05/06/22  1036 05/06/22  1617 05/06/22 2027 05/07/22  0530   POCGLU 264* 93 209* 244*       Blood Sugar Average: Last 72 hrs:  · (P) 229 6 with hyperglycemia on admission  · Continue home insulin regimen along with correctional insulin/Accu-Cheks while inpatient  · Carb controlled diet  · Initiate hypoglycemia protocol  ·

## 2022-05-08 ENCOUNTER — APPOINTMENT (INPATIENT)
Dept: NON INVASIVE DIAGNOSTICS | Facility: HOSPITAL | Age: 77
DRG: 313 | End: 2022-05-08
Payer: COMMERCIAL

## 2022-05-08 VITALS
HEIGHT: 72 IN | OXYGEN SATURATION: 100 % | SYSTOLIC BLOOD PRESSURE: 167 MMHG | DIASTOLIC BLOOD PRESSURE: 70 MMHG | RESPIRATION RATE: 19 BRPM | BODY MASS INDEX: 30.61 KG/M2 | WEIGHT: 226 LBS | HEART RATE: 61 BPM | TEMPERATURE: 96.9 F

## 2022-05-08 LAB
ANION GAP SERPL CALCULATED.3IONS-SCNC: 4 MMOL/L (ref 4–13)
AORTIC VALVE MEAN VELOCITY: 7 M/S
APICAL FOUR CHAMBER EJECTION FRACTION: 59 %
ATRIAL RATE: 208 BPM
ATRIAL RATE: 214 BPM
AV LVOT MEAN GRADIENT: 1 MMHG
AV LVOT PEAK GRADIENT: 3 MMHG
AV MEAN GRADIENT: 2 MMHG
AV PEAK GRADIENT: 5 MMHG
AV VELOCITY RATIO: 0.75
BASELINE ST DEPRESSION: 0 MM
BASOPHILS # BLD AUTO: 0.01 THOUSANDS/ΜL (ref 0–0.1)
BASOPHILS NFR BLD AUTO: 0 % (ref 0–1)
BUN SERPL-MCNC: 26 MG/DL (ref 5–25)
CALCIUM SERPL-MCNC: 9 MG/DL (ref 8.3–10.1)
CHLORIDE SERPL-SCNC: 107 MMOL/L (ref 100–108)
CO2 SERPL-SCNC: 31 MMOL/L (ref 21–32)
CREAT SERPL-MCNC: 1.13 MG/DL (ref 0.6–1.3)
DOP CALC AO PEAK VEL: 1.1 M/S
DOP CALC AO VTI: 18.46 CM
DOP CALC LVOT PEAK VEL VTI: 15.47 CM
DOP CALC LVOT PEAK VEL: 0.82 M/S
E WAVE DECELERATION TIME: 110 MS
EOSINOPHIL # BLD AUTO: 0.09 THOUSAND/ΜL (ref 0–0.61)
EOSINOPHIL NFR BLD AUTO: 1 % (ref 0–6)
ERYTHROCYTE [DISTWIDTH] IN BLOOD BY AUTOMATED COUNT: 15.8 % (ref 11.6–15.1)
FRACTIONAL SHORTENING: 29 % (ref 28–44)
GFR SERPL CREATININE-BSD FRML MDRD: 63 ML/MIN/1.73SQ M
GLUCOSE SERPL-MCNC: 110 MG/DL (ref 65–140)
GLUCOSE SERPL-MCNC: 110 MG/DL (ref 65–140)
GLUCOSE SERPL-MCNC: 55 MG/DL (ref 65–140)
HCT VFR BLD AUTO: 35.4 % (ref 36.5–49.3)
HGB BLD-MCNC: 11.3 G/DL (ref 12–17)
IMM GRANULOCYTES # BLD AUTO: 0.04 THOUSAND/UL (ref 0–0.2)
IMM GRANULOCYTES NFR BLD AUTO: 1 % (ref 0–2)
INTERVENTRICULAR SEPTUM IN DIASTOLE (PARASTERNAL SHORT AXIS VIEW): 1.5 CM
INTERVENTRICULAR SEPTUM: 1.5 CM (ref 0.56–1.06)
LAAS-AP4: 25.7 CM2
LEFT INTERNAL DIMENSION IN SYSTOLE: 3.6 CM (ref 4.45–6.74)
LEFT VENTRICULAR INTERNAL DIMENSION IN DIASTOLE: 5.1 CM (ref 7.44–11.09)
LEFT VENTRICULAR POSTERIOR WALL IN END DIASTOLE: 1.3 CM (ref 0.55–1.04)
LEFT VENTRICULAR STROKE VOLUME: 68 ML
LVSV (TEICH): 68 ML
LYMPHOCYTES # BLD AUTO: 1.08 THOUSANDS/ΜL (ref 0.6–4.47)
LYMPHOCYTES NFR BLD AUTO: 16 % (ref 14–44)
MCH RBC QN AUTO: 25.8 PG (ref 26.8–34.3)
MCHC RBC AUTO-ENTMCNC: 31.9 G/DL (ref 31.4–37.4)
MCV RBC AUTO: 81 FL (ref 82–98)
MONOCYTES # BLD AUTO: 0.59 THOUSAND/ΜL (ref 0.17–1.22)
MONOCYTES NFR BLD AUTO: 9 % (ref 4–12)
MV E'TISSUE VEL-LAT: 19 CM/S
MV E'TISSUE VEL-SEP: 13 CM/S
MV PEAK A VEL: 0.76 M/S
MV PEAK E VEL: 115 CM/S
MV STENOSIS PRESSURE HALF TIME: 32 MS
MV VALVE AREA P 1/2 METHOD: 6.88 CM2
NEUTROPHILS # BLD AUTO: 5.15 THOUSANDS/ΜL (ref 1.85–7.62)
NEUTS SEG NFR BLD AUTO: 73 % (ref 43–75)
NRBC BLD AUTO-RTO: 0 /100 WBCS
NUC STRESS EJECTION FRACTION: 61 %
P AXIS: 0 DEGREES
P AXIS: 116 DEGREES
PLATELET # BLD AUTO: 115 THOUSANDS/UL (ref 149–390)
PMV BLD AUTO: 10.6 FL (ref 8.9–12.7)
POTASSIUM SERPL-SCNC: 3.6 MMOL/L (ref 3.5–5.3)
QRS AXIS: 129 DEGREES
QRS AXIS: 170 DEGREES
QRSD INTERVAL: 132 MS
QRSD INTERVAL: 134 MS
QT INTERVAL: 396 MS
QT INTERVAL: 424 MS
QTC INTERVAL: 484 MS
QTC INTERVAL: 489 MS
RATE PRESSURE PRODUCT: NORMAL
RBC # BLD AUTO: 4.38 MILLION/UL (ref 3.88–5.62)
RIGHT ATRIAL 2D VOLUME: 129 ML
RIGHT ATRIUM AREA SYSTOLE A4C: 31 CM2
RIGHT VENTRICLE ID DIMENSION: 4.8 CM
SL CV LV EF: 60
SL CV PED ECHO LEFT VENTRICLE DIASTOLIC VOLUME (MOD BIPLANE) 2D: 122 ML
SL CV PED ECHO LEFT VENTRICLE SYSTOLIC VOLUME (MOD BIPLANE) 2D: 54 ML
SL CV REST NUCLEAR ISOTOPE DOSE: 10.8 MCI
SL CV STRESS NUCLEAR ISOTOPE DOSE: 33 MCI
SL CV STRESS RECOVERY BP: NORMAL MMHG
SL CV STRESS RECOVERY HR: 81 BPM
SL CV STRESS RECOVERY O2 SAT: 98 %
SODIUM SERPL-SCNC: 142 MMOL/L (ref 136–145)
STRESS ANGINA INDEX: 0
STRESS BASELINE BP: NORMAL MMHG
STRESS BASELINE HR: 71 BPM
STRESS O2 SAT REST: 94 %
STRESS PEAK HR: 90 BPM
STRESS POST O2 SAT PEAK: 99 %
STRESS POST PEAK BP: 145 MMHG
STRESS/REST PERFUSION RATIO: 0.95
T WAVE AXIS: 39 DEGREES
T WAVE AXIS: 47 DEGREES
TR MAX PG: 39 MMHG
TR PEAK VELOCITY: 3.1 M/S
TRICUSPID VALVE PEAK REGURGITATION VELOCITY: 3.11 M/S
VENTRICULAR RATE: 80 BPM
VENTRICULAR RATE: 90 BPM
WBC # BLD AUTO: 6.96 THOUSAND/UL (ref 4.31–10.16)
Z-SCORE OF INTERVENTRICULAR SEPTUM IN END DIASTOLE: 5.48
Z-SCORE OF LEFT VENTRICULAR DIMENSION IN END DIASTOLE: -5.77
Z-SCORE OF LEFT VENTRICULAR DIMENSION IN END SYSTOLE: -3.32
Z-SCORE OF LEFT VENTRICULAR POSTERIOR WALL IN END DIASTOLE: 4

## 2022-05-08 PROCEDURE — 82948 REAGENT STRIP/BLOOD GLUCOSE: CPT

## 2022-05-08 PROCEDURE — 93306 TTE W/DOPPLER COMPLETE: CPT | Performed by: INTERNAL MEDICINE

## 2022-05-08 PROCEDURE — 99232 SBSQ HOSP IP/OBS MODERATE 35: CPT | Performed by: INTERNAL MEDICINE

## 2022-05-08 PROCEDURE — 93306 TTE W/DOPPLER COMPLETE: CPT

## 2022-05-08 PROCEDURE — 99239 HOSP IP/OBS DSCHRG MGMT >30: CPT | Performed by: INTERNAL MEDICINE

## 2022-05-08 PROCEDURE — 80048 BASIC METABOLIC PNL TOTAL CA: CPT | Performed by: INTERNAL MEDICINE

## 2022-05-08 PROCEDURE — 85025 COMPLETE CBC W/AUTO DIFF WBC: CPT | Performed by: INTERNAL MEDICINE

## 2022-05-08 RX ORDER — LISINOPRIL 10 MG/1
10 TABLET ORAL DAILY
Qty: 30 TABLET | Refills: 0 | Status: SHIPPED | OUTPATIENT
Start: 2022-05-08 | End: 2022-05-17 | Stop reason: SDUPTHER

## 2022-05-08 RX ORDER — CARVEDILOL 6.25 MG/1
6.25 TABLET ORAL 2 TIMES DAILY WITH MEALS
Qty: 60 TABLET | Refills: 1 | Status: SHIPPED | OUTPATIENT
Start: 2022-05-08 | End: 2022-05-17 | Stop reason: SDUPTHER

## 2022-05-08 RX ORDER — ASPIRIN 81 MG/1
81 TABLET ORAL DAILY
Qty: 30 TABLET | Refills: 2 | Status: SHIPPED | OUTPATIENT
Start: 2022-05-08

## 2022-05-08 RX ORDER — LISINOPRIL 10 MG/1
10 TABLET ORAL DAILY
Status: DISCONTINUED | OUTPATIENT
Start: 2022-05-08 | End: 2022-05-08 | Stop reason: HOSPADM

## 2022-05-08 RX ORDER — ASPIRIN 81 MG/1
81 TABLET ORAL DAILY
Status: DISCONTINUED | OUTPATIENT
Start: 2022-05-08 | End: 2022-05-08 | Stop reason: HOSPADM

## 2022-05-08 RX ORDER — AMLODIPINE BESYLATE 5 MG/1
5 TABLET ORAL DAILY
Status: DISCONTINUED | OUTPATIENT
Start: 2022-05-08 | End: 2022-05-08

## 2022-05-08 RX ADMIN — LISINOPRIL 10 MG: 10 TABLET ORAL at 16:46

## 2022-05-08 RX ADMIN — CARVEDILOL 6.25 MG: 6.25 TABLET, FILM COATED ORAL at 06:57

## 2022-05-08 RX ADMIN — ASPIRIN 81 MG: 81 TABLET, COATED ORAL at 16:46

## 2022-05-08 RX ADMIN — INSULIN HUMAN 45 UNITS: 500 INJECTION, SOLUTION SUBCUTANEOUS at 06:40

## 2022-05-08 RX ADMIN — FUROSEMIDE 40 MG: 40 TABLET ORAL at 10:25

## 2022-05-08 RX ADMIN — Medication 325 MG: at 10:25

## 2022-05-08 RX ADMIN — PRAVASTATIN SODIUM 80 MG: 80 TABLET ORAL at 16:38

## 2022-05-08 RX ADMIN — INSULIN HUMAN 45 UNITS: 500 INJECTION, SOLUTION SUBCUTANEOUS at 16:30

## 2022-05-08 RX ADMIN — CARVEDILOL 6.25 MG: 6.25 TABLET, FILM COATED ORAL at 16:38

## 2022-05-08 RX ADMIN — FLUTICASONE FUROATE AND VILANTEROL TRIFENATATE 1 PUFF: 100; 25 POWDER RESPIRATORY (INHALATION) at 10:24

## 2022-05-08 NOTE — PLAN OF CARE
Problem: SAFETY ADULT  Goal: Patient will remain free of falls  Description: INTERVENTIONS:  - Educate patient/family on patient safety including physical limitations  - Instruct patient to call for assistance with activity   - Consult OT/PT to assist with strengthening/mobility   - Keep Call bell within reach  - Keep bed low and locked with side rails adjusted as appropriate  - Keep care items and personal belongings within reach  - Initiate and maintain comfort rounds  - Make Fall Risk Sign visible to staff  - Initiate/Maintain alarm  - Obtain necessary fall risk management equipment:   - Apply yellow socks and bracelet for high fall risk patients  - Consider moving patient to room near nurses station  Outcome: Progressing

## 2022-05-08 NOTE — PLAN OF CARE
Problem: Potential for Falls  Goal: Patient will remain free of falls  Description: INTERVENTIONS:  - Educate patient/family on patient safety including physical limitations  - Instruct patient to call for assistance with activity   - Consult OT/PT to assist with strengthening/mobility   - Keep Call bell within reach  - Keep bed low and locked with side rails adjusted as appropriate  - Keep care items and personal belongings within reach  - Initiate and maintain comfort rounds  - Make Fall Risk Sign visible to staff  Problem: MOBILITY - ADULT  Goal: Maintain or return to baseline ADL function  Description: INTERVENTIONS:  -  Assess patient's ability to carry out ADLs; assess patient's baseline for ADL function and identify physical deficits which impact ability to perform ADLs (bathing, care of mouth/teeth, toileting, grooming, dressing, etc )  - Assess/evaluate cause of self-care deficits   - Assess range of motion  - Assess patient's mobility; develop plan if impaired  - Assess patient's need for assistive devices and provide as appropriate  - Encourage maximum independence but intervene and supervise when necessary  - Involve family in performance of ADLs  - Assess for home care needs following discharge   - Consider OT consult to assist with ADL evaluation and planning for discharge  - Provide patient education as appropriate  Outcome: Progressing  Goal: Maintains/Returns to pre admission functional level  Description: INTERVENTIONS:  - Perform BMAT or MOVE assessment daily    - Set and communicate daily mobility goal to care team and patient/family/caregiver     - Collaborate with rehabilitation services on mobility goals if consulted  Problem: Prexisting or High Potential for Compromised Skin Integrity  Goal: Skin integrity is maintained or improved  Description: INTERVENTIONS:  - Identify patients at risk for skin breakdown  - Assess and monitor skin integrity  - Assess and monitor nutrition and hydration status  - Monitor labs   - Assess for incontinence   - Turn and reposition patient  - Assist with mobility/ambulation  - Relieve pressure over bony prominences  - Avoid friction and shearing  - Provide appropriate hygiene as needed including keeping skin clean and dry  - Evaluate need for skin moisturizer/barrier cream  - Collaborate with interdisciplinary team   - Patient/family teaching  - Consider wound care consult   Outcome: Progressing     Problem: PAIN - ADULT  Goal: Verbalizes/displays adequate comfort level or baseline comfort level  Description: Interventions:  - Encourage patient to monitor pain and request assistance  - Assess pain using appropriate pain scale  - Administer analgesics based on type and severity of pain and evaluate response  - Implement non-pharmacological measures as appropriate and evaluate response  - Consider cultural and social influences on pain and pain management  - Notify physician/advanced practitioner if interventions unsuccessful or patient reports new pain  Outcome: Progressing     Problem: Knowledge Deficit  Goal: Patient/family/caregiver demonstrates understanding of disease process, treatment plan, medications, and discharge instructions  Description: Complete learning assessment and assess knowledge base    Interventions:  - Provide teaching at level of understanding  - Provide teaching via preferred learning methods  Outcome: Progressing     - Out of bed for toileting  - Record patient progress and toleration of activity level   Outcome: Progressing     - Apply yellow socks and bracelet for high fall risk patients  - Consider moving patient to room near nurses station  Outcome: Progressing Asthma

## 2022-05-08 NOTE — CASE MANAGEMENT
Case Management Assessment & Discharge Planning Note    Patient name Gerald Yuan  [facility] [id number]  : 1945 Date 2022       Current Admission Date: 2022  Current Admission Diagnosis:Atypical chest pain   Patient Active Problem List    Diagnosis Date Noted    Shortness of breath 2022    Atypical chest pain 2022    CKD (chronic kidney disease) stage 3, GFR 30-59 ml/min [facility] 2022    History of prostate cancer 2022    UTI (urinary tract infection) 2022    [facility] 2022    Fracture of navicular bone of left foot 2021    Chronic respiratory failure with hypoxia ([facility] ) 04/15/2021    Obesity, morbid ([facility] ) 2021    PAD (peripheral artery disease) ([facility] ) 2021    History of lung cancer 2020    DDD (degenerative disc disease), lumbar 2020    Anemia, iron deficiency 2020    Lung nodule 2020    Pulmonary hypertension ([facility] 2019    JACQUELINE (obstructive sleep apnea) 2019    COPD with acute exacerbation ([facility] ) 2019    Oxygen dependent 2018    RBBB 2018    CAD (coronary artery disease) 2018    Chronic diastolic heart failure (HCC) 2018    Pleural effusion on right 10/31/2017    COPD, severe ([facility] ) 2017    Diabetic neuropathy ([facility] 2017    Essential hypertension 2016    Venous stasis dermatitis of both lower extremities 11/15/2016    Type 2 diabetes mellitus with hyperglycemia, with long-term current use of insulin ([facility] ) 2016    COPD exacerbation ([facility] 2016    Obesity (BMI 30-39 9) 2016    HLD (hyperlipidemia) 2016      LOS (days): 2  Geometric Mean LOS (GMLOS) (days): 1 70  Days to GMLOS:-0 2     OBJECTIVE:  PATIENT READMITTED TO HOSPITAL  Risk of Unplanned Readmission Score: 41         Current admission status: Inpatient       Preferred Pharmacy:   [facility] [id number] - [place], [address] [place] [address]  Phone: [phone] Fax: [phone]    [address] [place], [place] - [address] [facility] [address] [facility] [place] [address]  Phone: [phone] Fax: [phone]    Primary Care Provider: Flora Castanon MD    Primary Insurance: [facility]  Secondary Insurance:     ASSESSMENT:  [address] [employer] Representative - Son   Primary Phone: [phone] (Mobile)                  Readmission Root Cause  30 Day Readmission: No    Patient Information  Admitted from[de-identified] Home  Mental Status: Alert  During Assessment patient was accompanied by: Not accompanied during assessment  Assessment information provided by[de-identified] Patient  Primary Caregiver: Self  Support Systems: Son  [place] of Residence: [address] [place] [address] do you live in?: [place]  Type of Current Residence: 2 story home  Upon entering residence, is there a bedroom on the main floor (no further steps)?: Yes  Upon entering residence, is there a bathroom on the main floor (no further steps)?: Yes  In the last 12 months, was there a time when you were not able to pay the mortgage or rent on time?: No  In the last 12 months, how many places have you lived?: 1  In the last 12 months, was there a time when you did not have a steady place to sleep or slept in a shelter (including now)?: No  Homeless/housing insecurity resource given?: No  Living Arrangements: Lives w/ Son    Activities of Daily Living Prior to Admission  Functional Status: Independent  Completes ADLs independently?: Yes  Ambulates independently?: Yes  Does patient use assisted devices?: Yes  Assisted Devices (DME) used: Home Oxygen concentrator  DME Company Name (respiratory supplies):  [employer]  O2 Rate(s): 2  Does patient currently own DME?: Yes  What DME does the patient currently own?: Home Oxygen concentrator  Does patient have a history of Outpatient Therapy (PT/OT)?: No  Does the patient have a history of Short-Term Rehab?: No  Does patient have a history of HHC?: No  Does patient currently have [facility]?: No     Patient Information Continued  Income Source: Pension/correction  Does patient have prescription coverage?: Yes  Within the past 12 months, you worried that your food would run out before you got the money to buy more : Never true  Within the past 12 months, the food you bought just didnt last and you didnt have money to get more : Never true  Food insecurity resource given?: No  Does patient receive dialysis treatments?: No  Does patient have a history of substance abuse?: No  Does patient have a history of Mental Health Diagnosis?: No     Means of Transportation  Means of Transport to Appts[de-identified] Family transport  In the past 12 months, has lack of transportation kept you from medical appointments or from getting medications?: No  In the past 12 months, has lack of transportation kept you from meetings, work, or from getting things needed for daily living?: No  Was application for public transport provided?: No    DISCHARGE DETAILS:    Discharge planning discussed with[de-identified] Patient  Freedom of Choice: Yes     CM contacted family/caregiver?: Yes  Were Treatment Team discharge recommendations reviewed with patient/caregiver?: Yes  Did patient/caregiver verbalize understanding of patient care needs?: Yes  Were patient/caregiver advised of the risks associated with not following Treatment Team discharge recommendations?: Yes    Contacts  Patient Contacts: Remy Reynolds  Relationship to Patient[de-identified] Family  Contact Method: Phone  Phone Number: [phone]  Reason/Outcome: Continuity of [facility] Planning       Treatment Team Recommendation: Home  Discharge Destination Plan[de-identified] Home  Transport at Discharge : Family      Additional Comments: CM met with pt at bedside after attending requested CM assistance with transportation to home later today    Pt is still waiting for Echo results but will be able to d/c to home with no needs after results have been recieved  Initially care team was unable to reach pt's son to discuss d/c plan however, son called back and is agreeable to provide transportation to home for pt when pt is ready  Care team made aware

## 2022-05-08 NOTE — PROGRESS NOTES
Cardiology Progress Note - Brianna Doyle Sr  78 y o  male MRN: [id number]    Unit/Bed#: [facility] 420-01 Encounter: [id number]    Assessment and plan  1  Atypical chest pain with history of coronary artery disease  2  Hypertension  3  Hyperlipidemia  4  Chronic diastolic congestive heart failure  5  Tobacco abuse  6  History of lung cancer status post resection  7  Diabetes mellitus type 2  8  COPD    Recommendations:  Chest pain has improved  Nuclear stress test showed no ischemia or scar with preserved left ventricular systolic function  Will review formal echocardiogram today  If within normal limits no further cardiac workup required in the hospital could be followed up as an outpatient  EKGs were reviewed there does not appear to be any atrial fibrillation he is sinus rhythm with frequent PACs and first-degree AV block  Subjective:    No significant events overnight  Denies any chest pain or discomfort still has some mild shortness of breath  No palpitations or lightheaded there is no significant events on telemetry  ROS    Objective:   Vitals: Blood pressure 165/65, pulse 65, temperature (!) 97 3 °F (36 3 °C), resp  rate 17, height 6' (1 829 m), weight 102 kg (225 lb 1 4 oz), SpO2 96 %  , Body mass index is 30 53 kg/m² ,   Orthostatic Blood Pressures      Most Recent Value   Blood Pressure 165/65 filed at 05/08/2022 9491   Patient Position - Orthostatic VS Lying filed at 05/07/2022 2183         Systolic (97BWJ), SNB:058 , Min:141 , RNI:905     Diastolic (75EKG), GBN:48, Min:53, Max:77      Intake/Output Summary (Last 24 hours) at 5/8/2022 1019  Last data filed at 5/8/2022 0555  Gross per 24 hour   Intake --   Output 2635 ml   Net -2635 ml     Weight (last 2 days)     Date/Time Weight    05/06/22 0340 102 (225 09)            Telemetry Review: No significant arrhythmias seen on telemetry review  EKG personally reviewed by Lizz Blake DO  Physical Exam  Vitals and nursing note reviewed  Constitutional:       General: He is not in acute distress  Appearance: He is well-developed  HENT:      Head: Normocephalic and atraumatic  Eyes:      Conjunctiva/sclera: Conjunctivae normal       Pupils: Pupils are equal, round, and reactive to light  Cardiovascular:      Rate and Rhythm: Normal rate and regular rhythm  Heart sounds: Normal heart sounds  No murmur heard  No friction rub  Pulmonary:      Effort: Pulmonary effort is normal  No respiratory distress  Breath sounds: Normal breath sounds  No wheezing or rales  Abdominal:      General: Bowel sounds are normal  There is no distension  Palpations: Abdomen is soft  Tenderness: There is no abdominal tenderness  There is no rebound  Musculoskeletal:         General: No tenderness or deformity  Normal range of motion  Cervical back: Neck supple  Skin:     General: Skin is warm and dry  Findings: No erythema  Neurological:      Mental Status: He is alert and oriented to person, place, and time  Cranial Nerves: No cranial nerve deficit             Laboratory Results:        CBC with diff:   Results from last 7 days   Lab Units 05/08/22 0442 05/05/22 1919 05/02/22  0309 05/01/22  2116   WBC Thousand/uL 6 96 11 50* 9 27 7 05   HEMOGLOBIN g/dL 11 3* 11 7* 12 2 11 9*   HEMATOCRIT % 35 4* 38 1 40 9 38 8   MCV fL 81* 82 84 83   PLATELETS Thousands/uL 115* 142* 140* 140*   MCH pg 25 8* 25 2* 25 1* 25 6*   MCHC g/dL 31 9 30 7* 29 8* 30 7*   RDW % 15 8* 16 0* 15 9* 15 9*   MPV fL 10 6 10 5 10 4 11 0   NRBC AUTO /100 WBCs 0 0  --  0         CMP:  Results from last 7 days   Lab Units 05/08/22 0442 05/05/22 1919 05/02/22  0309 05/01/22  2116   POTASSIUM mmol/L 3 6 4 6 4 4 4 3   CHLORIDE mmol/L 107 106 101 105   CO2 mmol/L 31 25 31 35*   BUN mg/dL 26* 26* 20 22   CREATININE mg/dL 1 13 1 15 1 21 1 12   CALCIUM mg/dL 9 0 8 3 8 5 8 5   AST U/L  --   --  16  --    ALT U/L  --   --  22  --    ALK PHOS U/L  --   --  90 --    EGFR ml/min/1 73sq m 63 62 58 64         BMP:  Results from last 7 days   Lab Units 22  0442 22  1919 22  0309 22  2116   POTASSIUM mmol/L 3 6 4 6 4 4 4 3   CHLORIDE mmol/L 107 106 101 105   CO2 mmol/L 31 25 31 35*   BUN mg/dL 26* 26* 20 22   CREATININE mg/dL 1 13 1 15 1 21 1 12   CALCIUM mg/dL 9 0 8 3 8 5 8 5       BNP: No results for input(s): BNP in the last 72 hours  Magnesium:       Coags:   Results from last 7 days   Lab Units 22  0309   PTT seconds 26   INR  0 90       TSH:        Hemoglobin A1C       Lipid Profile:       Cardiac testing:   Results for orders placed during the hospital encounter of 20    Echo complete with contrast if indicated    [facility]to  [facility]  [facility], [address] [facility]  [phone]    Transthoracic Echocardiogram  2D, Doppler, and Color Doppler    Study date:  2020    Patient: Isabel Perez  MR number: [id number]  Account number: [de-identified]  : 1945  Age: 69 years  Gender: Male  Status: Inpatient  Location: Bedside  Height: 72 in  Weight: 265 lb  BP: 158/ 83 mmHg    Indications: Heart Failure    Diagnoses: I50 9 - Heart failure, unspecified    Sonographer:  JOLIE Rosado  Primary Physician:  Oscar Mccarthy MD  Referring Physician: YOLANDE Gordon  Group:  [facility]  Cardiology Fellow:  Ap Candelario DO  Interpreting Physician:  Cristy Estrada MD    SUMMARY    PROCEDURE INFORMATION:  This was a technically difficult study  Cardiac structures were not all visualized  Intravenous contrast (  4ml Definity in NSS) was administered to opacify the left ventricle  LEFT VENTRICLE:  Systolic function was normal  Ejection fraction was estimated to be 68 %  There were no regional wall motion abnormalities  Wall thickness was increased  Concentric hypertrophy was present    Doppler parameters were consistent with abnormal left ventricular relaxation (grade 1 diastolic dysfunction)  RIGHT VENTRICLE:  The ventricle was dilated  Systolic function was reduced  LEFT ATRIUM:  The atrium was dilated  HISTORY: PRIOR HISTORY: GERD,Pleural effusion,Obesity,DM2,CKD3,MI,COPD,SOB,CHF,CAD,CP,RBBB,JACQUELINE,HTN,HLD    PROCEDURE: The procedure was performed at the bedside  This was a routine study  The transthoracic approach was used  The study included complete 2D imaging, complete spectral Doppler, and color Doppler  The heart rate was 92 bpm, at the  start of the study  Intravenous contrast (  4ml Definity in NSS) was administered to opacify the left ventricle  Echocardiographic views were limited due to poor acoustic window availability, decreased penetration, and lung interference  No M-mode measurements were possible  This was a technically difficult study  Cardiac structures were not all visualized  LEFT VENTRICLE: Size was normal  Systolic function was normal  Ejection fraction was estimated to be 68 %  There were no regional wall motion abnormalities  Wall thickness was increased  Concentric hypertrophy was present  DOPPLER: Doppler  parameters were consistent with abnormal left ventricular relaxation (grade 1 diastolic dysfunction)  RIGHT VENTRICLE: The ventricle was dilated  Systolic function was reduced  LEFT ATRIUM: The atrium was dilated  MITRAL VALVE: Valve structure was normal  There was normal leaflet separation  DOPPLER: The transmitral velocity was within the normal range  There was no evidence for stenosis  There was no regurgitation  AORTIC VALVE: The valve was trileaflet  Leaflets exhibited normal cuspal separation and sclerosis  DOPPLER: Transaortic velocity was within the normal range  There was no evidence for stenosis  There was no regurgitation  PERICARDIUM: There was no pericardial effusion  The pericardium was normal in appearance  AORTA: The root exhibited normal size      SYSTEM MEASUREMENT TABLES    PW  E': 0 08 m/s  E/E': 9 45  MV A Jose: 1 04 m/s  MV Dec [place]: 4 07 m/s2  MV DecT: 195 86 ms  MV E Jose: 0 8 m/s  MV E/A Ratio: 0 76  MV PHT: 56 8 ms  MVA By PHT: 3 87 cm2    [employer] Accredited Echocardiography Laboratory    Prepared and electronically signed by    Lazara Comer MD  Signed 30-Jul-2020 13:42:14    No results found for this or any previous visit  No results found for this or any previous visit  No results found for this or any previous visit  Meds/Allergies   all current active meds have been reviewed  Medications Prior to Admission   Medication    ferrous sulfate 325 (65 Fe) mg tablet    fluticasone-umeclidinium-vilanterol (Trelegy Ellipta) 100-62 5-25 MCG/INH inhaler    fluticasone-umeclidinium-vilanterol (Trelegy Ellipta) 100-62 5-25 MCG/INH inhaler    furosemide (LASIX) 40 mg tablet    metoprolol tartrate (LOPRESSOR) 25 mg tablet    potassium chloride (K-DUR,KLOR-CON) 20 mEq tablet    simvastatin (ZOCOR) 40 mg tablet    BD Insulin Syringe U-500 31G X 6MM 0 5 ML MISC    cyclobenzaprine (FLEXERIL) 10 mg tablet    glucose blood (OneTouch Verio) test strip    HUMULIN R 500 UNIT/ML CONCENTRATED injection    ipratropium-albuterol (DUO-NEB) 0 5-2 5 mg/3 mL nebulizer solution          Assessment:  Principal Problem:    Atypical chest pain  Active Problems:    Type 2 diabetes mellitus with hyperglycemia, with long-term current use of insulin (HCC)    COPD, severe (HCC)    Chronic diastolic heart failure (HCC)    CAD (coronary artery disease)    Adenocarcinoma of lung (HCC)            Counseling / Coordination of Care  Total floor / unit time spent today 25 minutes  Greater than 50% of total time was spent with the patient and / or family counseling and / or coordination of care  A description of the counseling / coordination of care: Sandy Fernandes

## 2022-05-09 ENCOUNTER — TRANSITIONAL CARE MANAGEMENT (OUTPATIENT)
Dept: FAMILY MEDICINE CLINIC | Facility: CLINIC | Age: 77
End: 2022-05-09

## 2022-05-09 NOTE — UTILIZATION REVIEW
Notification of Discharge   This is a Notification of Discharge from our facility [address]  Please be advised that this patient has been discharge from our facility  Below you will find the admission and discharge date and time including the patients disposition  UTILIZATION REVIEW CONTACT:  [facility]  Utilization   Network Utilization Review Department  Phone: [phone] x carefully listen to the prompts  All voicemails are confidential   Email: [email or web address]     PHYSICIAN ADVISORY SERVICES:  FOR CUMP-JL-BYBJ REVIEW - MEDICAL NECESSITY DENIAL  Phone: [phone]  Fax: [phone]  Email: [email or web address]     PRESENTATION DATE: 5/5/2022  6:48 PM  OBERVATION ADMISSION DATE:   INPATIENT ADMISSION DATE: 5/6/22  3:54 PM   DISCHARGE DATE: 5/8/2022  6:22 PM  DISPOSITION: Home/Self Care Home/Self Care      IMPORTANT INFORMATION:  Send all requests for admission clinical reviews, approved or denied determinations and any other requests to dedicated fax number below belonging to the campus where the patient is receiving treatment   List of dedicated fax numbers:  [address] DENIALS (Administrative/Medical Necessity) [phone]   [address] (Maternity/NICU/Pediatrics) [phone]   [facility] [phone]   [address] [facility] [phone]   [address] [place] [address] [phone]   [address] [phone]   [facility]  [phone]   [address] [place] [address], [facility]  [address] [phone]

## 2022-05-10 LAB
CHEST PAIN STATEMENT: NORMAL
MAX DIASTOLIC BP: 75 MMHG
MAX HEART RATE: 90 BPM
MAX PREDICTED HEART RATE: 146 BPM
MAX. SYSTOLIC BP: 157 MMHG
PROTOCOL NAME: NORMAL
REASON FOR TERMINATION: NORMAL
TARGET HR FORMULA: NORMAL
TEST INDICATION: NORMAL
TIME IN EXERCISE PHASE: NORMAL

## 2022-05-11 ENCOUNTER — TELEPHONE (OUTPATIENT)
Dept: FAMILY MEDICINE CLINIC | Facility: CLINIC | Age: 77
End: 2022-05-11

## 2022-05-14 NOTE — ASSESSMENT & PLAN NOTE
· MIKKI 3 on admission  · Recurrence of right-sided chest heaviness prompting presentation, currently somewhat persistent with deep inspiration but improved following IV Toradol  · HStrop 12->17, EKG without acute ischemic change  · Suspect this may be atypical in setting of musculoskeletal versus underlying COPD, however given recurrent admissions for same along with reported cardiac history will follow-up final troponin and obtain stress ECHO      -patient cleared for discharge after negative stress test

## 2022-05-14 NOTE — ASSESSMENT & PLAN NOTE
· Not in acute exacerbation, continue home inhalers/nebulizers  · Comfortable on 2lnc which is baseline

## 2022-05-14 NOTE — ASSESSMENT & PLAN NOTE
Wt Readings from Last 3 Encounters:   05/08/22 103 kg (226 lb)   05/03/22 99 7 kg (219 lb 12 8 oz)   04/24/22 99 7 kg (219 lb 12 8 oz)     · Appears euvolemic at this time  · Continue home cardiac medications  · Monitor daily weights, I/O, volume status    5/6  -last echocardiogram in 2020 with preserved ejection fraction, will obtain new echocardiogram given recent admission for acute on chronic hypoxic respiratory failure and ongoing complain of chest pain    5/7/22:  Patient pending stress test this afternoon  This morning on my evaluation denies any recurrence of his feet overnight with this morning    He is currently comfortable at rest he denies any shortness of breath    -continue daily Lasix  -monitor in's an out's    5/8/22:  Reviewed results of stress test with Cardiology, negative for ischemia, patient is stable to discharge home today

## 2022-05-14 NOTE — DISCHARGE SUMMARY
[facility]  Discharge- Janee Welsh Sr  1945, 78 y o  male MRN: [id number]  Unit/Bed#: [facility] 420-01 Encounter: [id number]  Primary Care Provider: Wan Rowan MD   Date and time admitted to hospital: 5/5/2022  6:48 PM    Adenocarcinoma of lung [facility])  Assessment & Plan  · History of adenocarcinoma of lung s/p resection  · Continue outpatient follow-up with pulmonology    CAD (coronary artery disease)  Assessment & Plan  · S/p prior stenting in 2015  · Workup of chest pain as above with plan for stress echo-results returned negative patient will be discharged today with outpatient cardiology follow-up  · Continue cardiac medications  · Patient denies chest pain this morning    Chronic diastolic heart failure ([address] )  Assessment & Plan  Wt Readings from Last 3 Encounters:   05/08/22 103 kg (226 lb)   05/03/22 99 7 kg (219 lb 12 8 oz)   04/24/22 99 7 kg (219 lb 12 8 oz)     · Appears euvolemic at this time  · Continue home cardiac medications  · Monitor daily weights, I/O, volume status    5/6  -last echocardiogram in 2020 with preserved ejection fraction, will obtain new echocardiogram given recent admission for acute on chronic hypoxic respiratory failure and ongoing complain of chest pain    5/7/22:  Patient pending stress test this afternoon  This morning on my evaluation denies any recurrence of his feet overnight with this morning    He is currently comfortable at rest he denies any shortness of breath    -continue daily Lasix  -monitor in's an out's    5/8/22:  Reviewed results of stress test with Cardiology, negative for ischemia, patient is stable to discharge home today      COPD, severe ([address] )  Assessment & Plan  · Not in acute exacerbation, continue home inhalers/nebulizers  · Comfortable on 2lnc which is baseline    Type 2 diabetes mellitus with hyperglycemia, with long-term current use of insulin [facility])  Assessment & Plan  Lab Results   Component Value Date HGBA1C 9 2 (H) 04/22/2022       No results for input(s): POCGLU in the last 72 hours  Blood Sugar Average: Last 72 hrs:  ·  with hyperglycemia on admission  · Continue home insulin regimen along with correctional insulin/Accu-Cheks while inpatient  · Carb controlled diet  · Initiate hypoglycemia protocol  ·     * Atypical chest pain  Assessment & Plan  · MIKKI 3 on admission  · Recurrence of right-sided chest heaviness prompting presentation, currently somewhat persistent with deep inspiration but improved following IV Toradol  · HStrop 12->17, EKG without acute ischemic change  · Suspect this may be atypical in setting of musculoskeletal versus underlying COPD, however given recurrent admissions for same along with reported cardiac history will follow-up final troponin and obtain stress ECHO      -patient cleared for discharge after negative stress test        Medical Problems             Resolved Problems  Date Reviewed: 5/14/2022   None               Discharging Physician / Practitioner: Marshall Loera DO  PCP: Cally Peck MD  Admission Date:   Admission Orders (From admission, onward)     Ordered        05/06/22 1554  Inpatient Admission  Once            05/05/22 2322  Place in Observation  Once                      Discharge Date: 05/08/22  Consultations During Hospital Stay:  · cardiology    Procedures Performed:   · Stress test-negative    Significant Findings / Test Results:   XR chest 2 views   ED Interpretation by Jolly Burciaga DO (05/05 2211)   Chest x-ray interpreted me shows a chronic scarring of the right upper lobe, chronic right-sided pleural effusion versus atelectasis, no acute pathology, no acute change from May 1, 2022      Final Result by Nikos Stephens MD (05/06 1120)      1  No acute pulmonary process    Recent chest CT noted airspace opacities in the left upper lobe, not appreciated on this chest x-ray exam    2   Chronic pleural parenchymal changes in the right lower lung zone with possible small effusion  Workstation performed: [id number]         ·     Incidental Findings:   · As under imaging     Test Results Pending at Discharge (will require follow up):   · none     Outpatient Tests Requested:  · Cardiology f/u    Complications:  none    Reason for Admission: chest pain    Hospital Course:   Sarabjit Modi  is a 78 y o  male patient who originally presented to the hospital on 5/5/2022 due to chest pain in the setting of a history of CAD  Troponin was negative x3 his EKG showed right bundle branch block, nuclear stress test was negative  Patient was discharged with outpatient cardiology follow-up  He was continued on aspirin, statin, beta-blocker  Please see above list of diagnoses and related plan for additional information  Condition at Discharge: good    Discharge Day Visit / Exam:   Subjective:  Patient denies any chest pain shortness of breath on the day of discharge  Discussed negative stress test and need to follow-up with outpatient physician  Vitals: Blood Pressure: 167/70 (05/08/22 1444)  Pulse: 61 (05/08/22 1444)  Temperature: (!) 96 9 °F (36 1 °C) (05/08/22 1444)  Temp Source: Oral (05/07/22 2219)  Respirations: 19 (05/08/22 1444)  Height: 6' (182 9 cm) (05/08/22 1305)  Weight - Scale: 103 kg (226 lb) (05/08/22 1305)  SpO2: 100 % (05/08/22 1444)  Exam:   Physical Exam  Vitals and nursing note reviewed  Constitutional:       General: He is not in acute distress  Appearance: He is well-developed  He is not ill-appearing, toxic-appearing or diaphoretic  HENT:      Head: Normocephalic and atraumatic  Eyes:      General: No scleral icterus  Conjunctiva/sclera: Conjunctivae normal    Cardiovascular:      Rate and Rhythm: Normal rate and regular rhythm  Heart sounds: No murmur heard  No friction rub  No gallop  Pulmonary:      Effort: Pulmonary effort is normal  No respiratory distress  Breath sounds: Normal breath sounds   No stridor  No wheezing, rhonchi or rales  Comments: 2lnc  Chest:      Chest wall: No tenderness  Abdominal:      General: There is no distension  Palpations: Abdomen is soft  There is no mass  Tenderness: There is no abdominal tenderness  There is no guarding or rebound  Hernia: No hernia is present  Musculoskeletal:      Cervical back: Neck supple  Right lower leg: Edema present  Left lower leg: Edema present  Skin:     General: Skin is warm and dry  Neurological:      Mental Status: He is alert and oriented to person, place, and time  Discussion with Family: Updated  (son) via phone  Discharge instructions/Information to patient and family:   See after visit summary for information provided to patient and family  Provisions for Follow-Up Care:  See after visit summary for information related to follow-up care and any pertinent home health orders  Disposition:   Home    Planned Readmission: no     Discharge Statement:  I spent 35 minutes discharging the patient  This time was spent on the day of discharge  I had direct contact with the patient on the day of discharge  Greater than 50% of the total time was spent examining patient, answering all patient questions, arranging and discussing plan of care with patient as well as directly providing post-discharge instructions  Additional time then spent on discharge activities  Discharge Medications:  See after visit summary for reconciled discharge medications provided to patient and/or family        **Please Note: This note may have been constructed using a voice recognition system**

## 2022-05-14 NOTE — ASSESSMENT & PLAN NOTE
Lab Results   Component Value Date    HGBA1C 9 2 (H) 04/22/2022       No results for input(s): POCGLU in the last 72 hours      Blood Sugar Average: Last 72 hrs:  ·  with hyperglycemia on admission  · Continue home insulin regimen along with correctional insulin/Accu-Cheks while inpatient  · Carb controlled diet  · Initiate hypoglycemia protocol  ·

## 2022-05-14 NOTE — ASSESSMENT & PLAN NOTE
· S/p prior stenting in 2015  · Workup of chest pain as above with plan for stress echo-results returned negative patient will be discharged today with outpatient cardiology follow-up  · Continue cardiac medications  · Patient denies chest pain this morning

## 2022-05-17 ENCOUNTER — OFFICE VISIT (OUTPATIENT)
Dept: FAMILY MEDICINE CLINIC | Facility: CLINIC | Age: 77
End: 2022-05-17
Payer: COMMERCIAL

## 2022-05-17 VITALS
DIASTOLIC BLOOD PRESSURE: 70 MMHG | RESPIRATION RATE: 16 BRPM | WEIGHT: 218 LBS | HEART RATE: 94 BPM | OXYGEN SATURATION: 96 % | SYSTOLIC BLOOD PRESSURE: 150 MMHG | TEMPERATURE: 98 F | BODY MASS INDEX: 29.53 KG/M2 | HEIGHT: 72 IN

## 2022-05-17 DIAGNOSIS — J96.12 CHRONIC RESPIRATORY FAILURE WITH HYPOXIA AND HYPERCAPNIA (HCC): Chronic | ICD-10-CM

## 2022-05-17 DIAGNOSIS — I25.10 CORONARY ARTERY DISEASE INVOLVING NATIVE CORONARY ARTERY OF NATIVE HEART WITHOUT ANGINA PECTORIS: ICD-10-CM

## 2022-05-17 DIAGNOSIS — E11.8 TYPE 2 DIABETES MELLITUS WITH COMPLICATION, WITH LONG-TERM CURRENT USE OF INSULIN (HCC): ICD-10-CM

## 2022-05-17 DIAGNOSIS — N18.30 STAGE 3 CHRONIC KIDNEY DISEASE, UNSPECIFIED WHETHER STAGE 3A OR 3B CKD (HCC): ICD-10-CM

## 2022-05-17 DIAGNOSIS — G89.29 CHRONIC BILATERAL LOW BACK PAIN, UNSPECIFIED WHETHER SCIATICA PRESENT: ICD-10-CM

## 2022-05-17 DIAGNOSIS — Z79.4 TYPE 2 DIABETES MELLITUS WITH COMPLICATION, WITH LONG-TERM CURRENT USE OF INSULIN (HCC): ICD-10-CM

## 2022-05-17 DIAGNOSIS — J44.1 COPD EXACERBATION (HCC): ICD-10-CM

## 2022-05-17 DIAGNOSIS — C34.91 ADENOCARCINOMA OF RIGHT LUNG (HCC): Chronic | ICD-10-CM

## 2022-05-17 DIAGNOSIS — I10 ESSENTIAL HYPERTENSION: ICD-10-CM

## 2022-05-17 DIAGNOSIS — G89.29 CHRONIC BILATERAL LOW BACK PAIN WITHOUT SCIATICA: ICD-10-CM

## 2022-05-17 DIAGNOSIS — E11.65 TYPE 2 DIABETES MELLITUS WITH HYPERGLYCEMIA, WITH LONG-TERM CURRENT USE OF INSULIN (HCC): Chronic | ICD-10-CM

## 2022-05-17 DIAGNOSIS — Z79.4 TYPE 2 DIABETES MELLITUS WITH HYPERGLYCEMIA, WITH LONG-TERM CURRENT USE OF INSULIN (HCC): Chronic | ICD-10-CM

## 2022-05-17 DIAGNOSIS — M54.50 CHRONIC BILATERAL LOW BACK PAIN, UNSPECIFIED WHETHER SCIATICA PRESENT: ICD-10-CM

## 2022-05-17 DIAGNOSIS — E78.00 HYPERCHOLESTEROLEMIA: ICD-10-CM

## 2022-05-17 DIAGNOSIS — N18.31 STAGE 3A CHRONIC KIDNEY DISEASE (HCC): ICD-10-CM

## 2022-05-17 DIAGNOSIS — M54.50 CHRONIC BILATERAL LOW BACK PAIN WITHOUT SCIATICA: ICD-10-CM

## 2022-05-17 DIAGNOSIS — R07.89 ATYPICAL CHEST PAIN: Primary | ICD-10-CM

## 2022-05-17 DIAGNOSIS — I50.32 CHRONIC DIASTOLIC HEART FAILURE (HCC): ICD-10-CM

## 2022-05-17 DIAGNOSIS — I50.33 ACUTE ON CHRONIC DIASTOLIC (CONGESTIVE) HEART FAILURE (HCC): ICD-10-CM

## 2022-05-17 DIAGNOSIS — J96.11 CHRONIC RESPIRATORY FAILURE WITH HYPOXIA AND HYPERCAPNIA (HCC): Chronic | ICD-10-CM

## 2022-05-17 DIAGNOSIS — J44.9 COPD, SEVERE (HCC): ICD-10-CM

## 2022-05-17 PROBLEM — N39.0 UTI (URINARY TRACT INFECTION): Status: RESOLVED | Noted: 2022-01-27 | Resolved: 2022-05-17

## 2022-05-17 PROBLEM — R06.02 SHORTNESS OF BREATH: Status: RESOLVED | Noted: 2022-04-21 | Resolved: 2022-05-17

## 2022-05-17 PROBLEM — E66.01 OBESITY, MORBID (HCC): Status: RESOLVED | Noted: 2021-03-12 | Resolved: 2022-05-17

## 2022-05-17 PROCEDURE — 99495 TRANSJ CARE MGMT MOD F2F 14D: CPT | Performed by: FAMILY MEDICINE

## 2022-05-17 RX ORDER — POTASSIUM CHLORIDE 20 MEQ/1
20 TABLET, EXTENDED RELEASE ORAL DAILY
Qty: 30 TABLET | Refills: 5 | Status: ON HOLD | OUTPATIENT
Start: 2022-05-17 | End: 2022-05-22 | Stop reason: SDUPTHER

## 2022-05-17 RX ORDER — CYCLOBENZAPRINE HCL 10 MG
10 TABLET ORAL 2 TIMES DAILY
Qty: 60 TABLET | Refills: 5 | Status: SHIPPED | OUTPATIENT
Start: 2022-05-17

## 2022-05-17 RX ORDER — FERROUS SULFATE 325(65) MG
325 TABLET ORAL
Qty: 30 TABLET | Refills: 5 | Status: SHIPPED | OUTPATIENT
Start: 2022-05-17

## 2022-05-17 RX ORDER — INSULIN HUMAN 500 [IU]/ML
45 INJECTION, SOLUTION SUBCUTANEOUS
Qty: 40 ML | Refills: 5 | Status: SHIPPED | OUTPATIENT
Start: 2022-05-17

## 2022-05-17 RX ORDER — FLUTICASONE FUROATE, UMECLIDINIUM BROMIDE AND VILANTEROL TRIFENATATE 100; 62.5; 25 UG/1; UG/1; UG/1
1 POWDER RESPIRATORY (INHALATION) DAILY
Qty: 1 EACH | Refills: 5 | Status: SHIPPED | OUTPATIENT
Start: 2022-05-17 | End: 2022-06-16

## 2022-05-17 RX ORDER — SIMVASTATIN 40 MG
40 TABLET ORAL DAILY
Qty: 30 TABLET | Refills: 5 | Status: SHIPPED | OUTPATIENT
Start: 2022-05-17

## 2022-05-17 RX ORDER — CARVEDILOL 6.25 MG/1
6.25 TABLET ORAL 2 TIMES DAILY WITH MEALS
Qty: 60 TABLET | Refills: 5 | Status: ON HOLD | OUTPATIENT
Start: 2022-05-17 | End: 2022-05-22 | Stop reason: SDUPTHER

## 2022-05-17 RX ORDER — CELECOXIB 200 MG/1
200 CAPSULE ORAL DAILY
Qty: 30 CAPSULE | Refills: 5 | Status: SHIPPED | OUTPATIENT
Start: 2022-05-17 | End: 2022-06-19

## 2022-05-17 RX ORDER — LISINOPRIL 10 MG/1
10 TABLET ORAL DAILY
Qty: 30 TABLET | Refills: 5 | Status: ON HOLD | OUTPATIENT
Start: 2022-05-17 | End: 2022-05-22 | Stop reason: SDUPTHER

## 2022-05-17 RX ORDER — FUROSEMIDE 40 MG/1
40 TABLET ORAL DAILY
Qty: 30 TABLET | Refills: 5 | Status: ON HOLD | OUTPATIENT
Start: 2022-05-17 | End: 2022-05-22 | Stop reason: SDUPTHER

## 2022-05-17 NOTE — ASSESSMENT & PLAN NOTE
Lab Results   Component Value Date    EGFR 63 05/08/2022    EGFR 62 05/05/2022    EGFR 58 05/02/2022    CREATININE 1 13 05/08/2022    CREATININE 1 15 05/05/2022    CREATININE 1 21 05/02/2022     Has been stable

## 2022-05-17 NOTE — PROGRESS NOTES
BMI Counseling: Body mass index is 29 57 kg/m²  The BMI is above normal  Nutrition recommendations include decreasing portion sizes, encouraging healthy choices of fruits and vegetables, consuming healthier snacks and moderation in carbohydrate intake  Exercise recommendations include exercising 3-5 times per week  No pharmacotherapy was ordered  Rationale for BMI follow-up plan is due to patient being overweight or obese  Depression Screening and Follow-up Plan: Patient was screened for depression during today's encounter  They screened negative with a PHQ-2 score of 0  Assessment/Plan:         Problem List Items Addressed This Visit        Endocrine    Type 2 diabetes mellitus with hyperglycemia, with long-term current use of insulin (HCC) (Chronic)     A1C 9 2 in April 2022  Increase Humulin R insulin 45U to bid  Advised pt to follow a low carb diet and to exercise on a regular basis  Needs to schedule eye exam  Foot exam  Had flu shot in Dec 2021  Lab Results   Component Value Date    HGBA1C 9 2 (H) 04/22/2022              Relevant Medications    insulin regular (HUMULIN R) 500 units/mL CONCENTRATED injection       Respiratory    Adenocarcinoma of lung (HCC) (Chronic)     Pt had resection and radiation  Had CT of chest in May 2022 and was ok  Had CT of head and A/P in March 2022 and normal             Relevant Medications    fluticasone-umeclidinium-vilanterol (Trelegy Ellipta) 100-62 5-25 MCG/INH inhaler    COPD, severe (HCC)     Stable on current medications  Needs to follow-up with Pulmonary  Relevant Medications    fluticasone-umeclidinium-vilanterol (Trelegy Ellipta) 100-62 5-25 MCG/INH inhaler    COPD exacerbation (HCC)    Relevant Medications    fluticasone-umeclidinium-vilanterol (Trelegy Ellipta) 100-62 5-25 MCG/INH inhaler       Cardiovascular and Mediastinum    Essential hypertension     BP has been ok on current meds              Relevant Medications    carvedilol (COREG) 6 25 mg tablet    furosemide (LASIX) 40 mg tablet    lisinopril (ZESTRIL) 10 mg tablet    Chronic diastolic heart failure (HCC)     Wt Readings from Last 3 Encounters:   05/08/22 103 kg (226 lb)   05/03/22 99 7 kg (219 lb 12 8 oz)   04/24/22 99 7 kg (219 lb 12 8 oz)     Stable on current medications  Had echocardiogram done in May 2022 and EF was 60%  Relevant Medications    carvedilol (COREG) 6 25 mg tablet    CAD (coronary artery disease)     Pt had stent placed in 2015  Had stress test in May 2022 and was ok  Continue current medications and follow-up with Cardiology  Relevant Medications    carvedilol (COREG) 6 25 mg tablet       Genitourinary    CKD (chronic kidney disease) stage 3, GFR 30-59 ml/min [facility])     Lab Results   Component Value Date    EGFR 63 05/08/2022    EGFR 62 05/05/2022    EGFR 58 05/02/2022    CREATININE 1 13 05/08/2022    CREATININE 1 15 05/05/2022    CREATININE 1 21 05/02/2022     Has been stable  Relevant Medications    ferrous sulfate 325 (65 Fe) mg tablet    furosemide (LASIX) 40 mg tablet       Other    Chronic bilateral low back pain without sciatica     Will start celebrex 200 qd and continue flexeril 10 mg bid  Relevant Medications    celecoxib (CeleBREX) 200 mg capsule    Atypical chest pain - Primary     Pt was admitted from 5/5/22 to 5/8/22  Cardiac enzymes were negative  Pt had nuclear stress test and echo done  Both were ok  Pt advised to follow-up with Cardiology as out-patient                Other Visit Diagnoses     Chronic bilateral low back pain, unspecified whether sciatica present        Relevant Medications    cyclobenzaprine (FLEXERIL) 10 mg tablet    Acute on chronic diastolic (congestive) heart failure        Relevant Medications    carvedilol (COREG) 6 25 mg tablet    furosemide (LASIX) 40 mg tablet    Chronic respiratory failure with hypoxia and hypercapnia (HCC)  (Chronic)       Relevant Medications    insulin regular (HUMULIN R) 500 units/mL CONCENTRATED injection    potassium chloride (K-DUR,KLOR-CON) 20 mEq tablet    Type 2 diabetes mellitus with complication, with long-term current use of insulin (HCC)        Relevant Medications    insulin regular (HUMULIN R) 500 units/mL CONCENTRATED injection    Hypercholesterolemia        Relevant Medications    simvastatin (ZOCOR) 40 mg tablet        TCM Call (since 4/16/2022)     Date and time call was made  5/16/2022 11:41 AM    Hospital care reviewed  Records reviewed    Patient was hospitialized at  [facility]    Date of Admission  05/05/22    Date of discharge  05/08/22    Diagnosis  Atypical chest pain    Disposition  Home    Current Symptoms  None      TCM Call (since 4/16/2022)     Post hospital issues  None    Scheduled for follow up? Yes    Did you obtain your prescribed medications  Yes    Do you need help managing your prescriptions or medications  No    Is transportation to your appointment needed  No    I have advised the patient to call PCP with any new or worsening symptoms  [place], [place]              Subjective:      Patient ID: Riya Durbin  is a 78 y o  male  Pt here for f/u hospital admission from 5/5 to 5/8 for chest pain  Pt had cardiac enzymes done and were negative  Pt also had stress test and was ok  Pt still gets pressure at times but not as bad  Pt denies burping or burning  Pt gets sob at times and uses inhalers  Sugars higher and has only been using insulin bid instead of tid  Pt wears O2 at home at 2L and uses prn         The following portions of the patient's history were reviewed and updated as appropriate:   Past Medical History:  He has a past medical history of Abdominal pain, MARANDA (acute kidney injury) ([address] ) (6/19/2018), Cardiac disease, CHF (congestive heart failure) ([address] ), COPD, severe ([address] ), Coronary artery disease, DDD (degenerative disc disease), lumbar (9/1/2020), Diabetes mellitus ([address] ), Dyspnea, GERD (gastroesophageal reflux disease), Hyperlipidemia, Hypertension, MI (myocardial infarction) ([facility] ), Nodule of apex of right lung, JACQUELINE (obstructive sleep apnea), and Prostate cancer ([facility] )  ,  _______________________________________________________________________  Medical Problems:  does not have any pertinent problems on file ,  _______________________________________________________________________  Past Surgical History:   has a past surgical history that includes Prostate surgery; Abdominal surgery; Angioplasty; Esophagogastroduodenoscopy (N/A, 10/2/2017); Appendectomy; Skin graft; Knee cartilage surgery; Other surgical history; Colonoscopy (2015); IR thoracentesis (11/3/2020); and CT needle biopsy lung (11/3/2020)  ,  _______________________________________________________________________  Family History:  family history includes Heart disease in his father; Other in his father ,  _______________________________________________________________________  Social History:   reports that he quit smoking about 22 months ago  He started smoking about 65 years ago  He has a 75 00 pack-year smoking history  He has never used smokeless tobacco  He reports that he does not drink alcohol and does not use drugs  ,  _______________________________________________________________________  Allergies:  is allergic to crestor [rosuvastatin] and metformin     _______________________________________________________________________  Current Outpatient Medications   Medication Sig Dispense Refill    aspirin (ECOTRIN LOW STRENGTH) 81 mg EC tablet Take 1 tablet (81 mg total) by mouth daily 30 tablet 2    BD Insulin Syringe U-500 31G X 6MM 0 5 ML MISC USE 1 SYRINGE THREE TIMES A DAY FOR INJECTING INSULIN 100 each 5    carvedilol (COREG) 6 25 mg tablet Take 1 tablet (6 25 mg total) by mouth in the morning and 1 tablet (6 25 mg total) in the evening  Take with meals   60 tablet 5    celecoxib (CeleBREX) 200 mg capsule Take 1 capsule (200 mg total) by mouth in the morning  30 capsule 5    cyclobenzaprine (FLEXERIL) 10 mg tablet Take 1 tablet (10 mg total) by mouth in the morning and 1 tablet (10 mg total) before bedtime  60 tablet 5    ferrous sulfate 325 (65 Fe) mg tablet Take 1 tablet (325 mg total) by mouth daily with breakfast 30 tablet 5    fluticasone-umeclidinium-vilanterol (Trelegy Ellipta) 100-62 5-25 MCG/INH inhaler Inhale 1 puff  in the morning  Rinse mouth after use    1 each 5    furosemide (LASIX) 40 mg tablet Take 1 tablet (40 mg total) by mouth in the morning  30 tablet 5    glucose blood (OneTouch Verio) test strip May substitute brand based on insurance coverage  Check glucose QID  100 each 0    insulin regular (HUMULIN R) 500 units/mL CONCENTRATED injection Inject 0 09 mL (45 Units total) under the skin in the morning and 0 09 mL (45 Units total) at noon and 0 09 mL (45 Units total) in the evening  Inject before meals  40 mL 5    lisinopril (ZESTRIL) 10 mg tablet Take 1 tablet (10 mg total) by mouth in the morning  30 tablet 5    potassium chloride (K-DUR,KLOR-CON) 20 mEq tablet Take 1 tablet (20 mEq total) by mouth in the morning  30 tablet 5    simvastatin (ZOCOR) 40 mg tablet Take 1 tablet (40 mg total) by mouth in the morning  30 tablet 5    ipratropium-albuterol (DUO-NEB) 0 5-2 5 mg/3 mL nebulizer solution Take 3 mL by nebulization 3 (three) times a day (Patient not taking: Reported on 5/17/2022) 3 mL 0     No current facility-administered medications for this visit      _______________________________________________________________________  Review of Systems   Constitutional: Negative for fatigue and unexpected weight change  Respiratory: Positive for shortness of breath  Negative for cough  Cardiovascular: Positive for chest pain  Gastrointestinal: Negative for abdominal pain, constipation, diarrhea and vomiting  Musculoskeletal: Positive for back pain  Negative for arthralgias     Neurological: Negative for dizziness and headaches  Psychiatric/Behavioral: Negative for dysphoric mood  The patient is not nervous/anxious  Objective:  Vitals:    05/17/22 1104   BP: 150/70   BP Location: Left arm   Patient Position: Sitting   Cuff Size: Standard   Pulse: 94   Resp: 16   Temp: 98 °F (36 7 °C)   TempSrc: Temporal   SpO2: 96%   Weight: 98 9 kg (218 lb)   Height: 6' (1 829 m)     Body mass index is 29 57 kg/m²  Physical Exam  Vitals and nursing note reviewed  Constitutional:       Appearance: Normal appearance  He is well-developed and normal weight  Neck:      Thyroid: No thyromegaly  Cardiovascular:      Rate and Rhythm: Normal rate and regular rhythm  Heart sounds: Normal heart sounds  No murmur heard  Pulmonary:      Effort: Pulmonary effort is normal  No respiratory distress  Breath sounds: Normal breath sounds  No wheezing  Musculoskeletal:      Cervical back: Normal range of motion and neck supple  Right lower leg: No edema  Left lower leg: No edema  Lymphadenopathy:      Cervical: No cervical adenopathy  Neurological:      Mental Status: He is alert and oriented to person, place, and time  Cranial Nerves: No cranial nerve deficit  Sensory: Sensory deficit (hands and feet) present  Psychiatric:         Mood and Affect: Mood normal          Behavior: Behavior normal          Thought Content:  Thought content normal          Judgment: Judgment normal

## 2022-05-17 NOTE — ASSESSMENT & PLAN NOTE
Wt Readings from Last 3 Encounters:   05/08/22 103 kg (226 lb)   05/03/22 99 7 kg (219 lb 12 8 oz)   04/24/22 99 7 kg (219 lb 12 8 oz)     Stable on current medications  Had echocardiogram done in May 2022 and EF was 60%

## 2022-05-17 NOTE — ASSESSMENT & PLAN NOTE
Pt was admitted from 5/5/22 to 5/8/22  Cardiac enzymes were negative  Pt had nuclear stress test and echo done  Both were ok  Pt advised to follow-up with Cardiology as out-patient

## 2022-05-17 NOTE — ASSESSMENT & PLAN NOTE
Pt had stent placed in 2015  Had stress test in May 2022 and was ok  Continue current medications and follow-up with Cardiology

## 2022-05-17 NOTE — ASSESSMENT & PLAN NOTE
Pt had resection and radiation  Had CT of chest in May 2022 and was ok   Had CT of head and A/P in March 2022 and normal

## 2022-05-17 NOTE — ASSESSMENT & PLAN NOTE
A1C 9 2 in April 2022  Increase Humulin R insulin 45U to bid  Advised pt to follow a low carb diet and to exercise on a regular basis  Needs to schedule eye exam  Foot exam  Had flu shot in Dec 2021       Lab Results   Component Value Date    HGBA1C 9 2 (H) 04/22/2022

## 2022-05-21 ENCOUNTER — APPOINTMENT (EMERGENCY)
Dept: RADIOLOGY | Facility: HOSPITAL | Age: 77
End: 2022-05-21
Payer: COMMERCIAL

## 2022-05-21 ENCOUNTER — HOSPITAL ENCOUNTER (OUTPATIENT)
Facility: HOSPITAL | Age: 77
Setting detail: OBSERVATION
Discharge: HOME | End: 2022-05-22
Attending: EMERGENCY MEDICINE | Admitting: INTERNAL MEDICINE
Payer: COMMERCIAL

## 2022-05-21 DIAGNOSIS — J96.11 CHRONIC RESPIRATORY FAILURE WITH HYPOXIA AND HYPERCAPNIA (HCC): Chronic | ICD-10-CM

## 2022-05-21 DIAGNOSIS — I25.10 CORONARY ARTERY DISEASE INVOLVING NATIVE CORONARY ARTERY OF NATIVE HEART WITHOUT ANGINA PECTORIS: ICD-10-CM

## 2022-05-21 DIAGNOSIS — R07.9 CHEST PAIN: Primary | ICD-10-CM

## 2022-05-21 DIAGNOSIS — I10 ESSENTIAL HYPERTENSION: ICD-10-CM

## 2022-05-21 DIAGNOSIS — I50.33 ACUTE ON CHRONIC DIASTOLIC (CONGESTIVE) HEART FAILURE (HCC): ICD-10-CM

## 2022-05-21 DIAGNOSIS — J96.12 CHRONIC RESPIRATORY FAILURE WITH HYPOXIA AND HYPERCAPNIA (HCC): Chronic | ICD-10-CM

## 2022-05-21 LAB
ALBUMIN SERPL BCP-MCNC: 2.3 G/DL (ref 3.5–5)
ALP SERPL-CCNC: 92 U/L (ref 46–116)
ALT SERPL W P-5'-P-CCNC: 18 U/L (ref 12–78)
ANION GAP SERPL CALCULATED.3IONS-SCNC: 7 MMOL/L (ref 4–13)
AST SERPL W P-5'-P-CCNC: 11 U/L (ref 5–45)
BASOPHILS # BLD AUTO: 0.02 THOUSANDS/ΜL (ref 0–0.1)
BASOPHILS NFR BLD AUTO: 0 % (ref 0–1)
BILIRUB SERPL-MCNC: 0.19 MG/DL (ref 0.2–1)
BUN SERPL-MCNC: 24 MG/DL (ref 5–25)
CALCIUM ALBUM COR SERPL-MCNC: 9.8 MG/DL (ref 8.3–10.1)
CALCIUM SERPL-MCNC: 8.4 MG/DL (ref 8.3–10.1)
CARDIAC TROPONIN I PNL SERPL HS: 14 NG/L
CHLORIDE SERPL-SCNC: 104 MMOL/L (ref 100–108)
CO2 SERPL-SCNC: 25 MMOL/L (ref 21–32)
CREAT SERPL-MCNC: 1.57 MG/DL (ref 0.6–1.3)
EOSINOPHIL # BLD AUTO: 0.07 THOUSAND/ΜL (ref 0–0.61)
EOSINOPHIL NFR BLD AUTO: 1 % (ref 0–6)
ERYTHROCYTE [DISTWIDTH] IN BLOOD BY AUTOMATED COUNT: 15.7 % (ref 11.6–15.1)
FLUAV RNA RESP QL NAA+PROBE: NEGATIVE
FLUBV RNA RESP QL NAA+PROBE: NEGATIVE
GFR SERPL CREATININE-BSD FRML MDRD: 42 ML/MIN/1.73SQ M
GLUCOSE SERPL-MCNC: 352 MG/DL (ref 65–140)
HCT VFR BLD AUTO: 38.7 % (ref 36.5–49.3)
HGB BLD-MCNC: 12.1 G/DL (ref 12–17)
IMM GRANULOCYTES # BLD AUTO: 0.06 THOUSAND/UL (ref 0–0.2)
IMM GRANULOCYTES NFR BLD AUTO: 1 % (ref 0–2)
LYMPHOCYTES # BLD AUTO: 1.2 THOUSANDS/ΜL (ref 0.6–4.47)
LYMPHOCYTES NFR BLD AUTO: 13 % (ref 14–44)
MCH RBC QN AUTO: 25.5 PG (ref 26.8–34.3)
MCHC RBC AUTO-ENTMCNC: 31.3 G/DL (ref 31.4–37.4)
MCV RBC AUTO: 82 FL (ref 82–98)
MONOCYTES # BLD AUTO: 0.65 THOUSAND/ΜL (ref 0.17–1.22)
MONOCYTES NFR BLD AUTO: 7 % (ref 4–12)
NEUTROPHILS # BLD AUTO: 7.18 THOUSANDS/ΜL (ref 1.85–7.62)
NEUTS SEG NFR BLD AUTO: 78 % (ref 43–75)
NRBC BLD AUTO-RTO: 0 /100 WBCS
PLATELET # BLD AUTO: 155 THOUSANDS/UL (ref 149–390)
PMV BLD AUTO: 11 FL (ref 8.9–12.7)
POTASSIUM SERPL-SCNC: 4.4 MMOL/L (ref 3.5–5.3)
PROT SERPL-MCNC: 7.1 G/DL (ref 6.4–8.2)
RBC # BLD AUTO: 4.75 MILLION/UL (ref 3.88–5.62)
RSV RNA RESP QL NAA+PROBE: NEGATIVE
SARS-COV-2 RNA RESP QL NAA+PROBE: NEGATIVE
SODIUM SERPL-SCNC: 136 MMOL/L (ref 136–145)
WBC # BLD AUTO: 9.18 THOUSAND/UL (ref 4.31–10.16)

## 2022-05-21 PROCEDURE — 93005 ELECTROCARDIOGRAM TRACING: CPT

## 2022-05-21 PROCEDURE — 71045 X-RAY EXAM CHEST 1 VIEW: CPT

## 2022-05-21 PROCEDURE — 36415 COLL VENOUS BLD VENIPUNCTURE: CPT | Performed by: EMERGENCY MEDICINE

## 2022-05-21 PROCEDURE — 80053 COMPREHEN METABOLIC PANEL: CPT | Performed by: EMERGENCY MEDICINE

## 2022-05-21 PROCEDURE — 96374 THER/PROPH/DIAG INJ IV PUSH: CPT

## 2022-05-21 PROCEDURE — 85025 COMPLETE CBC W/AUTO DIFF WBC: CPT | Performed by: EMERGENCY MEDICINE

## 2022-05-21 PROCEDURE — 99285 EMERGENCY DEPT VISIT HI MDM: CPT | Performed by: EMERGENCY MEDICINE

## 2022-05-21 PROCEDURE — 84484 ASSAY OF TROPONIN QUANT: CPT | Performed by: EMERGENCY MEDICINE

## 2022-05-21 PROCEDURE — 99285 EMERGENCY DEPT VISIT HI MDM: CPT

## 2022-05-21 PROCEDURE — 0241U HB NFCT DS VIR RESP RNA 4 TRGT: CPT | Performed by: EMERGENCY MEDICINE

## 2022-05-21 RX ORDER — NITROGLYCERIN 0.4 MG/1
0.4 TABLET SUBLINGUAL ONCE
Status: COMPLETED | OUTPATIENT
Start: 2022-05-21 | End: 2022-05-21

## 2022-05-21 RX ORDER — ASPIRIN 325 MG
325 TABLET ORAL ONCE
Status: COMPLETED | OUTPATIENT
Start: 2022-05-21 | End: 2022-05-21

## 2022-05-21 RX ORDER — FENTANYL CITRATE 50 UG/ML
50 INJECTION, SOLUTION INTRAMUSCULAR; INTRAVENOUS ONCE
Status: COMPLETED | OUTPATIENT
Start: 2022-05-21 | End: 2022-05-21

## 2022-05-21 RX ADMIN — ASPIRIN 325 MG ORAL TABLET 325 MG: 325 PILL ORAL at 21:26

## 2022-05-21 RX ADMIN — FENTANYL CITRATE 50 MCG: 50 INJECTION, SOLUTION INTRAMUSCULAR; INTRAVENOUS at 23:59

## 2022-05-21 RX ADMIN — NITROGLYCERIN 0.4 MG: 0.4 TABLET SUBLINGUAL at 21:27

## 2022-05-22 VITALS
DIASTOLIC BLOOD PRESSURE: 75 MMHG | RESPIRATION RATE: 20 BRPM | OXYGEN SATURATION: 99 % | SYSTOLIC BLOOD PRESSURE: 175 MMHG | HEART RATE: 72 BPM | TEMPERATURE: 97.5 F

## 2022-05-22 LAB
2HR DELTA HS TROPONIN: -1 NG/L
4HR DELTA HS TROPONIN: 0 NG/L
CARDIAC TROPONIN I PNL SERPL HS: 13 NG/L
CARDIAC TROPONIN I PNL SERPL HS: 14 NG/L
GLUCOSE SERPL-MCNC: 263 MG/DL (ref 65–140)
GLUCOSE SERPL-MCNC: 322 MG/DL (ref 65–140)

## 2022-05-22 PROCEDURE — 36415 COLL VENOUS BLD VENIPUNCTURE: CPT | Performed by: EMERGENCY MEDICINE

## 2022-05-22 PROCEDURE — 84484 ASSAY OF TROPONIN QUANT: CPT | Performed by: EMERGENCY MEDICINE

## 2022-05-22 PROCEDURE — NC001 PR NO CHARGE: Performed by: PHYSICIAN ASSISTANT

## 2022-05-22 PROCEDURE — 99236 HOSP IP/OBS SAME DATE HI 85: CPT | Performed by: INTERNAL MEDICINE

## 2022-05-22 PROCEDURE — 93005 ELECTROCARDIOGRAM TRACING: CPT

## 2022-05-22 PROCEDURE — 82948 REAGENT STRIP/BLOOD GLUCOSE: CPT

## 2022-05-22 RX ORDER — CYCLOBENZAPRINE HCL 10 MG
10 TABLET ORAL 2 TIMES DAILY
Status: DISCONTINUED | OUTPATIENT
Start: 2022-05-22 | End: 2022-05-22 | Stop reason: HOSPADM

## 2022-05-22 RX ORDER — ONDANSETRON 2 MG/ML
4 INJECTION INTRAMUSCULAR; INTRAVENOUS EVERY 6 HOURS PRN
Status: DISCONTINUED | OUTPATIENT
Start: 2022-05-22 | End: 2022-05-22 | Stop reason: HOSPADM

## 2022-05-22 RX ORDER — HEPARIN SODIUM 5000 [USP'U]/ML
5000 INJECTION, SOLUTION INTRAVENOUS; SUBCUTANEOUS EVERY 8 HOURS SCHEDULED
Status: DISCONTINUED | OUTPATIENT
Start: 2022-05-22 | End: 2022-05-22 | Stop reason: HOSPADM

## 2022-05-22 RX ORDER — POTASSIUM CHLORIDE 20 MEQ/1
20 TABLET, EXTENDED RELEASE ORAL DAILY
Qty: 17 TABLET | Refills: 0 | Status: SHIPPED | OUTPATIENT
Start: 2022-05-22

## 2022-05-22 RX ORDER — LIDOCAINE 50 MG/G
1 PATCH TOPICAL DAILY
Status: DISCONTINUED | OUTPATIENT
Start: 2022-05-22 | End: 2022-05-22 | Stop reason: HOSPADM

## 2022-05-22 RX ORDER — CARVEDILOL 6.25 MG/1
6.25 TABLET ORAL 2 TIMES DAILY WITH MEALS
Qty: 34 TABLET | Refills: 0 | Status: SHIPPED | OUTPATIENT
Start: 2022-05-22 | End: 2022-06-13 | Stop reason: SDUPTHER

## 2022-05-22 RX ORDER — PRAVASTATIN SODIUM 80 MG/1
80 TABLET ORAL
Status: DISCONTINUED | OUTPATIENT
Start: 2022-05-22 | End: 2022-05-22 | Stop reason: HOSPADM

## 2022-05-22 RX ORDER — ACETAMINOPHEN 325 MG/1
975 TABLET ORAL EVERY 8 HOURS SCHEDULED
Status: DISCONTINUED | OUTPATIENT
Start: 2022-05-22 | End: 2022-05-22 | Stop reason: HOSPADM

## 2022-05-22 RX ORDER — FLUTICASONE FUROATE AND VILANTEROL 100; 25 UG/1; UG/1
1 POWDER RESPIRATORY (INHALATION) DAILY
Status: DISCONTINUED | OUTPATIENT
Start: 2022-05-22 | End: 2022-05-22 | Stop reason: HOSPADM

## 2022-05-22 RX ORDER — FUROSEMIDE 40 MG/1
40 TABLET ORAL DAILY
Qty: 17 TABLET | Refills: 0 | Status: SHIPPED | OUTPATIENT
Start: 2022-05-22 | End: 2022-06-13 | Stop reason: SDUPTHER

## 2022-05-22 RX ORDER — INSULIN LISPRO 100 [IU]/ML
4-20 INJECTION, SOLUTION INTRAVENOUS; SUBCUTANEOUS
Status: DISCONTINUED | OUTPATIENT
Start: 2022-05-22 | End: 2022-05-22 | Stop reason: HOSPADM

## 2022-05-22 RX ORDER — FUROSEMIDE 40 MG/1
40 TABLET ORAL DAILY
Status: DISCONTINUED | OUTPATIENT
Start: 2022-05-22 | End: 2022-05-22 | Stop reason: HOSPADM

## 2022-05-22 RX ORDER — LISINOPRIL 10 MG/1
10 TABLET ORAL DAILY
Qty: 17 TABLET | Refills: 0 | Status: SHIPPED | OUTPATIENT
Start: 2022-05-22 | End: 2022-06-13 | Stop reason: SDUPTHER

## 2022-05-22 RX ORDER — ASPIRIN 81 MG/1
81 TABLET ORAL DAILY
Status: DISCONTINUED | OUTPATIENT
Start: 2022-05-22 | End: 2022-05-22 | Stop reason: HOSPADM

## 2022-05-22 RX ORDER — INSULIN LISPRO 100 [IU]/ML
2-12 INJECTION, SOLUTION INTRAVENOUS; SUBCUTANEOUS
Status: DISCONTINUED | OUTPATIENT
Start: 2022-05-22 | End: 2022-05-22 | Stop reason: HOSPADM

## 2022-05-22 RX ORDER — CARVEDILOL 6.25 MG/1
6.25 TABLET ORAL 2 TIMES DAILY WITH MEALS
Status: DISCONTINUED | OUTPATIENT
Start: 2022-05-22 | End: 2022-05-22 | Stop reason: HOSPADM

## 2022-05-22 RX ORDER — FERROUS SULFATE 325(65) MG
325 TABLET ORAL
Status: DISCONTINUED | OUTPATIENT
Start: 2022-05-22 | End: 2022-05-22 | Stop reason: HOSPADM

## 2022-05-22 RX ADMIN — CARVEDILOL 6.25 MG: 6.25 TABLET, FILM COATED ORAL at 10:17

## 2022-05-22 RX ADMIN — INSULIN LISPRO 8 UNITS: 100 INJECTION, SOLUTION INTRAVENOUS; SUBCUTANEOUS at 12:32

## 2022-05-22 RX ADMIN — FUROSEMIDE 40 MG: 40 TABLET ORAL at 10:17

## 2022-05-22 RX ADMIN — INSULIN LISPRO 12 UNITS: 100 INJECTION, SOLUTION INTRAVENOUS; SUBCUTANEOUS at 06:39

## 2022-05-22 RX ADMIN — FLUTICASONE FUROATE AND VILANTEROL TRIFENATATE 1 PUFF: 100; 25 POWDER RESPIRATORY (INHALATION) at 10:18

## 2022-05-22 RX ADMIN — HEPARIN SODIUM 5000 UNITS: 5000 INJECTION INTRAVENOUS; SUBCUTANEOUS at 06:06

## 2022-05-22 RX ADMIN — LIDOCAINE 5% 1 PATCH: 700 PATCH TOPICAL at 02:57

## 2022-05-22 RX ADMIN — INSULIN HUMAN 45 UNITS: 500 INJECTION, SOLUTION SUBCUTANEOUS at 12:32

## 2022-05-22 RX ADMIN — CYCLOBENZAPRINE HYDROCHLORIDE 10 MG: 10 TABLET, FILM COATED ORAL at 02:57

## 2022-05-22 RX ADMIN — ACETAMINOPHEN 975 MG: 325 TABLET, FILM COATED ORAL at 02:57

## 2022-05-22 RX ADMIN — CYCLOBENZAPRINE HYDROCHLORIDE 10 MG: 10 TABLET, FILM COATED ORAL at 10:16

## 2022-05-22 RX ADMIN — ASPIRIN 81 MG: 81 TABLET, COATED ORAL at 10:17

## 2022-05-22 RX ADMIN — FERROUS SULFATE TAB 325 MG (65 MG ELEMENTAL FE) 325 MG: 325 (65 FE) TAB at 10:17

## 2022-05-22 NOTE — DISCHARGE SUMMARY
[facility]  Discharge- Ant Garcia Sr  1945, 78 y o  male MRN: [id number]  Unit/Bed#: 2 214-01 Encounter: [id number]  Primary Care Provider: Stephanie Tobin MD   Date and time admitted to hospital: 5/21/2022  9:00 PM    COPD, severe ([address] )  Assessment & Plan  · Not in acute exacerbation, continue home inhalers    * Atypical chest pain  Assessment & Plan  · MIKKI 3 on admission, patient presenting with recurrence of right-sided chest pain quite reproducible on palpation  No response to ASA/nitro/fentanyl  · Troponin x3 negative, EKG without evidence of acute ischemia  · Notably with negative nuclear stress test at last admission, seen in conjunction with Cardiology  · Suspect this is musculoskeletal in etiology vs pulmonary/pleuritic with chronic lung changes/fibrosis/atelectasis/COPD  · PRN pain control with home Flexeril, recommend OTD lidocaine patch    Recommend close f/u with pulmonology as suspect primary pulmonary cause to his pain     CKD (chronic kidney disease) stage 3, GFR 30-59 ml/min [facility])  Assessment & Plan  Lab Results   Component Value Date    EGFR 42 05/21/2022    EGFR 63 05/08/2022    EGFR 62 05/05/2022    CREATININE 1 57 (H) 05/21/2022    CREATININE 1 13 05/08/2022    CREATININE 1 15 05/05/2022   · Creatinine above recent discharge values however prior baseline was noted 1 2-1 5  · Monitor with BMP    Chronic respiratory failure with hypoxia (HCC)  Assessment & Plan  · On 2 L NC continuously, continue and titrate as needed to maintain SaO2 greater than 88%    Chronic diastolic heart failure (HCC)  Assessment & Plan  Wt Readings from Last 3 Encounters:   05/17/22 98 9 kg (218 lb)   05/08/22 103 kg (226 lb)   05/03/22 99 7 kg (219 lb 12 8 oz)     · Does not volume overloaded, has missed low-dose Lasix for past 2 days as he ran out  · Restart home dose Lasix, continue other cardiac medications  · Monitor daily weights, I/O, volume status    Type 2 diabetes mellitus with hyperglycemia, with long-term current use of insulin [facility])  Assessment & Plan  Lab Results   Component Value Date    HGBA1C 9 2 (H) 04/22/2022       Recent Labs     05/22/22  0609   POCGLU 322*       Blood Sugar Average: Last 72 hrs:  · (P) 322 not controlled with hyperglycemia on presentation  · Continue U500 insulin 45 units t i d  with meals, start correctional insulin with Accu-Cheks  · Carb controlled diet  · Initiate hypoglycemia protocol        Medical Problems             Resolved Problems  Date Reviewed: 5/22/2022   None               Discharging Physician / Practitioner: Venecia Whitlock PA-C  PCP: Flora Castanon MD  Admission Date:   Admission Orders (From admission, onward)     Ordered        05/22/22 0201  Place in Observation  Once                      Discharge Date: 05/22/22    Consultations During Hospital Stay:  · None    Procedures Performed:   · None    Significant Findings / Test Results:   · CXR with decreased R lung volume with chronic effusion, unchanged from prior imaging   · Troponin negative     Incidental Findings:   · None     Test Results Pending at Discharge (will require follow up): · None     Outpatient Tests Requested:  · None    Complications:  None    Reason for Admission: chest pain    Hospital Course:   Sarabjit Modi  is a 78 y o  male patient who originally presented to the hospital on 5/21/2022 due to chest pain  It was RT sided chest pain that did not radiate  EKG non ischemic, troponin negative  CP improved this AM   Do not suspect cardiac source of pain   Suspect related to underlying chronic lung disease/changes on imaging as this is where his pain is located  Recommend he have close follow up with pulmonology  Please see above list of diagnoses and related plan for additional information  Condition at Discharge: good    Discharge Day Visit / Exam:   Subjective:  Pt has no acute complaints, CP improved, denies shortness of breath     Vitals: Blood Pressure: (!) 175/75 (05/22/22 0754)  Pulse: 72 (05/22/22 0754)  Temperature: 97 5 °F (36 4 °C) (05/22/22 0754)  Temp Source: Oral (05/22/22 0244)  Respirations: 20 (05/22/22 0754)  SpO2: 99 % (05/22/22 0754)  Exam:   Physical Exam  Vitals reviewed  Constitutional:       General: He is not in acute distress  Appearance: He is not toxic-appearing  HENT:      Head: Normocephalic and atraumatic  Eyes:      Extraocular Movements: Extraocular movements intact  Cardiovascular:      Rate and Rhythm: Normal rate and regular rhythm  Pulmonary:      Effort: Pulmonary effort is normal  No respiratory distress  Comments: Decreased BS b/l  Abdominal:      General: Bowel sounds are normal  There is no distension  Palpations: Abdomen is soft  Tenderness: There is no abdominal tenderness  Musculoskeletal:         General: Swelling (mild pedal edema) present  Normal range of motion  Cervical back: Normal range of motion  Skin:     General: Skin is warm and dry  Neurological:      General: No focal deficit present  Mental Status: He is alert and oriented to person, place, and time  Cranial Nerves: No cranial nerve deficit  Psychiatric:         Mood and Affect: Mood normal          Behavior: Behavior normal          Thought Content: Thought content normal          Judgment: Judgment normal           Discussion with Family: Patient declined call to   Discharge instructions/Information to patient and family:   See after visit summary for information provided to patient and family  Provisions for Follow-Up Care:  See after visit summary for information related to follow-up care and any pertinent home health orders  Disposition:   Home    Planned Readmission: no     Discharge Statement:  I spent 35 minutes discharging the patient  This time was spent on the day of discharge  I had direct contact with the patient on the day of discharge   Greater than 50% of the total time was spent examining patient, answering all patient questions, arranging and discussing plan of care with patient as well as directly providing post-discharge instructions  Additional time then spent on discharge activities  Discharge Medications:  See after visit summary for reconciled discharge medications provided to patient and/or family        **Please Note: This note may have been constructed using a voice recognition system**

## 2022-05-22 NOTE — CASE MANAGEMENT
Case Management Assessment & Discharge Planning Note    Patient name Nicolasa Drilling   Location CW2 214/CW2 214-01 MRN [id number]  : 1945 Date 2022       Current Admission Date: 2022  Current Admission Diagnosis:Atypical chest pain   Patient Active Problem List    Diagnosis Date Noted    Chronic bilateral low back pain without sciatica 2022    Atypical chest pain 2022    CKD (chronic kidney disease) stage 3, GFR 30-59 ml/min ([address] ) 2022    History of prostate cancer 2022    Adenocarcinoma of lung ([address] ) 2022    Fracture of navicular bone of left foot 2021    Chronic respiratory failure with hypoxia [facility] ) 04/15/2021    PAD (peripheral artery disease) ([facility] ) 2021    History of lung cancer 2020    DDD (degenerative disc disease), lumbar 2020    Anemia, iron deficiency 2020    Lung nodule 2020    Pulmonary hypertension ([address] ) 2019    JACQUELINE (obstructive sleep apnea) 2019    COPD with acute exacerbation ([facility] ) 2019    Oxygen dependent 2018    RBBB 2018    CAD (coronary artery disease) 2018    Chronic diastolic heart failure ([facility] ) 2018    Pleural effusion on right 10/31/2017    COPD, severe ([address] ) 2017    Diabetic neuropathy ([facility] ) 2017    Essential hypertension 2016    Venous stasis dermatitis of both lower extremities 11/15/2016    Type 2 diabetes mellitus with hyperglycemia, with long-term current use of insulin ([facility] ) 2016    COPD exacerbation ([facility] ) 2016    HLD (hyperlipidemia) 2016      LOS (days): 0  Geometric Mean LOS (GMLOS) (days):   Days to GMLOS:     OBJECTIVE:              Current admission status: Observation       Preferred Pharmacy:   [address] [place] [address], [address] [place] [address]  Phone: [phone] Fax: [address] [address] [address]  Phone: [phone] Fax: [phone]    Primary Care Provider: Amber Guzman MD    Primary Insurance: [facility]  Secondary Insurance: [employer]    ASSESSMENT:  [address], [address] [facility] Representative - Son   Primary Phone: [phone] (Mobile)                                                                DISCHARGE DETAILS:    Discharge planning discussed with[de-identified] Pt cleared for discharged and lyft set up for 1500   unit RN made aware

## 2022-05-22 NOTE — ASSESSMENT & PLAN NOTE
· MIKKI 3 on admission, patient presenting with recurrence of right-sided chest pain quite reproducible on palpation  No response to ASA/nitro/fentanyl  · Troponin x2 negative, EKG without evidence of acute ischemia  · Notably with negative nuclear stress test at last admission, seen in conjunction with Cardiology  · Suspect this is musculoskeletal in etiology, however will obtain final troponin  · Trial urgent patch, p r n  pain control    Will need to discharge on better pain control regimen once confirmed noncardiac

## 2022-05-22 NOTE — ASSESSMENT & PLAN NOTE
Lab Results   Component Value Date    HGBA1C 9 2 (H) 04/22/2022       Recent Labs     05/22/22  0609   POCGLU 322*       Blood Sugar Average: Last 72 hrs:  · (P) 322 not controlled with hyperglycemia on presentation  · Continue U500 insulin 45 units t i d  with meals, start correctional insulin with Accu-Cheks  · Carb controlled diet  · Initiate hypoglycemia protocol

## 2022-05-22 NOTE — CASE MANAGEMENT
Case Management Assessment & Discharge Planning Note    Patient name Quyen [facility]   Location CW2 214/CW2 214-01 MRN [id number]  : 1945 Date 2022       Current Admission Date: 2022  Current Admission Diagnosis:Atypical chest pain   Patient Active Problem List    Diagnosis Date Noted    Chronic bilateral low back pain without sciatica 2022    Atypical chest pain 2022    CKD (chronic kidney disease) stage 3, GFR 30-59 ml/min ([facility] [facility] ) 2022    History of prostate cancer 2022    Adenocarcinoma of lung ([facility] ) 2022    Fracture of navicular bone of left foot 2021    Chronic respiratory failure with hypoxia ([facility] ) 04/15/2021    PAD (peripheral artery disease) ([facility] [facility] ) 2021    History of lung cancer 2020    DDD (degenerative disc disease), lumbar 2020    Anemia, iron deficiency 2020    Lung nodule 2020    Pulmonary hypertension ([address] ) 2019    JACQUELINE (obstructive sleep apnea) 2019    COPD with acute exacerbation ([facility] ) 2019    Oxygen dependent 2018    RBBB 2018    CAD (coronary artery disease) 2018    Chronic diastolic heart failure ([facility] [facility] ) 2018    Pleural effusion on right 10/31/2017    COPD, severe ([address] ) 2017    Diabetic neuropathy ([facility] ) 2017    Essential hypertension 2016    Venous stasis dermatitis of both lower extremities 11/15/2016    Type 2 diabetes mellitus with hyperglycemia, with long-term current use of insulin ([facility] [facility] ) 2016    COPD exacerbation ([facility] [facility] ) 2016    HLD (hyperlipidemia) 2016      LOS (days): 0  Geometric Mean LOS (GMLOS) (days):   Days to GMLOS:     OBJECTIVE:              Current admission status: Observation       Preferred Pharmacy:   [address] [place] [address], [address] [place] [address]  Phone: [phone] Fax: [address] [address] [place]  Phone: [phone] Fax: [phone]    Primary Care Provider: Stewart Rodrigues MD    Primary Insurance: [facility]  Secondary Insurance: Rylee Christie [address] [place] [address] [facility]    ASSESSMENT:  [address] [facility] Representative - Son   Primary Phone: [phone] (Mobile)                                                                DISCHARGE DETAILS:    Discharge planning discussed with[de-identified] medication ppicked up to pt and at bed side  Pt currently on RA  Cm to arrange lyft upon discharged

## 2022-05-22 NOTE — CASE MANAGEMENT
Case Management Assessment & Discharge Planning Note    Patient name Jere Coates   Location CW2 214/CW2 214-01 MRN [id number]  : 1945 Date 2022       Current Admission Date: 2022  Current Admission Diagnosis:Atypical chest pain   Patient Active Problem List    Diagnosis Date Noted    Chronic bilateral low back pain without sciatica 2022    Atypical chest pain 2022    CKD (chronic kidney disease) stage 3, GFR 30-59 ml/min ([facility] ) 2022    History of prostate cancer 2022    Adenocarcinoma of lung ([facility] ) 2022    Fracture of navicular bone of left foot 2021    Chronic respiratory failure with hypoxia ([facility] ) 04/15/2021    PAD (peripheral artery disease) ([facility] ) 2021    History of lung cancer 2020    DDD (degenerative disc disease), lumbar 2020    Anemia, iron deficiency 2020    Lung nodule 2020    Pulmonary hypertension ([address] ) 2019    JACQUELINE (obstructive sleep apnea) 2019    COPD with acute exacerbation ([facility] ) 2019    Oxygen dependent 2018    RBBB 2018    CAD (coronary artery disease) 2018    Chronic diastolic heart failure ([facility] ) 2018    Pleural effusion on right 10/31/2017    COPD, severe ([address] ) 2017    Diabetic neuropathy ([facility] ) 2017    Essential hypertension 2016    Venous stasis dermatitis of both lower extremities 11/15/2016    Type 2 diabetes mellitus with hyperglycemia, with long-term current use of insulin ([facility] ) 2016    COPD exacerbation ([facility] ) 2016    HLD (hyperlipidemia) 2016      LOS (days): 0  Geometric Mean LOS (GMLOS) (days):   Days to GMLOS:     OBJECTIVE:              Current admission status: Observation       Preferred Pharmacy:   [address] [place] [address], [address] [place] [address]  Phone: [phone] Fax: [address] [address] [place]  Phone: [phone] Fax: [phone]    Primary Care Provider: Oscar Mccarthy MD    Primary Insurance: [facility]  Secondary Insurance: [employer] [place] [employer]    ASSESSMENT:  [address] [facility] Representative - Son   Primary Phone: [phone] (Mobile)                              Patient Information  Admitted from[de-identified] Home  Mental Status: Alert  During Assessment patient was accompanied by: Not accompanied during assessment  Assessment information provided by[de-identified] Patient  Primary Caregiver: Self  Support Systems: Self, [facility] of Residence: [address] [facility] do you live in?: [employer] entry access options   Select all that apply : Stairs  Number of steps to enter home : 1  Type of Current Residence: [address] [place] home  Upon entering residence, is there a bathroom on the main floor (no further steps)?: No  Living Arrangements: Lives w/ Son    Activities of Daily Living Prior to Admission  Completes ADLs independently?: No  Level of ADL dependence: Assistance  Ambulates independently?: Yes  Does patient use assisted devices?: Yes  Assisted Devices (DME) used: Portable Oxygen concentrator, Portable Oxygen tanks, Shower Chair, [facility], Ruff Pilling, Wheelchair  DME Company Name (respiratory supplies): [employer]  O2 Rate(s): 2 lier as needed  Does patient currently own DME?: Yes  What DME does the patient currently own?: Straight Cane, Walker, Portable Oxygen tanks, Portable Oxygen concentrator, Shower Chair  Does patient have a history of Outpatient Therapy (PT/OT)?: No  Does the patient have a history of Short-Term Rehab?: Yes  Does patient have a history of HHC?: No  Does patient currently have [facility]?: No         Patient Information Continued  Income Source: Pension/longterm  Does patient have prescription coverage?: No                  DISCHARGE DETAILS:    Discharge planning discussed with[de-identified] Pt reviewed with medical tem, per medical team, pt potential discharged today  pt to discharged home pending medication confirmed  per medical team, pt expressed unable to afford medication cost pending his monthly check in June  Pt has insurances listed on file  Medication sent to [facility], Per homestar, copya for medication is $2:21  Cm spoke to pt made aware of same  Pt reported he can pay for medication however will need a ride home due to son [place] of state

## 2022-05-22 NOTE — ED PROVIDER NOTES
History  Chief Complaint   Patient presents with    Chest Pain     Patient c/o chest pressure and shortness of breath that began around 5p  Denies dizziness or lightheadedness but reports nausea     81-year-old male past medical history significant for hypertension, hyperlipidemia, insulin dependent diabetes, coronary artery disease, history cancer that presents ED today for chest pressure  Patient said that the pressure began around 5:00 p m  Today  He was sitting on the couch watching TV and was not exerting himself  He describes it as a chest pressure located in the right side of his chest without radiation to the bilateral arms or up to his neck  He has some associated nausea but no vomiting and no diaphoresis  Also has some shortness of breath currently  Prior to Admission Medications   Prescriptions Last Dose Informant Patient Reported? Taking? BD Insulin Syringe U-500 31G X 6MM 0 5 ML MISC   No No   Sig: USE 1 SYRINGE THREE TIMES A DAY FOR INJECTING INSULIN   aspirin (ECOTRIN LOW STRENGTH) 81 mg EC tablet   No No   Sig: Take 1 tablet (81 mg total) by mouth daily   carvedilol (COREG) 6 25 mg tablet   No No   Sig: Take 1 tablet (6 25 mg total) by mouth in the morning and 1 tablet (6 25 mg total) in the evening  Take with meals  celecoxib (CeleBREX) 200 mg capsule   No No   Sig: Take 1 capsule (200 mg total) by mouth in the morning  cyclobenzaprine (FLEXERIL) 10 mg tablet   No No   Sig: Take 1 tablet (10 mg total) by mouth in the morning and 1 tablet (10 mg total) before bedtime  ferrous sulfate 325 (65 Fe) mg tablet   No No   Sig: Take 1 tablet (325 mg total) by mouth daily with breakfast   fluticasone-umeclidinium-vilanterol (Trelegy Ellipta) 100-62 5-25 MCG/INH inhaler   No No   Sig: Inhale 1 puff  in the morning  Rinse mouth after use      furosemide (LASIX) 40 mg tablet   No No   Sig: Take 1 tablet (40 mg total) by mouth in the morning     glucose blood (OneTouch Verio) test strip No No   Sig: May substitute brand based on insurance coverage  Check glucose QID  insulin regular (HUMULIN R) 500 units/mL CONCENTRATED injection   No No   Sig: Inject 0 09 mL (45 Units total) under the skin in the morning and 0 09 mL (45 Units total) at noon and 0 09 mL (45 Units total) in the evening  Inject before meals  ipratropium-albuterol (DUO-NEB) 0 5-2 5 mg/3 mL nebulizer solution   No No   Sig: Take 3 mL by nebulization 3 (three) times a day   Patient not taking: Reported on 5/17/2022   lisinopril (ZESTRIL) 10 mg tablet   No No   Sig: Take 1 tablet (10 mg total) by mouth in the morning  potassium chloride (K-DUR,KLOR-CON) 20 mEq tablet   No No   Sig: Take 1 tablet (20 mEq total) by mouth in the morning  simvastatin (ZOCOR) 40 mg tablet   No No   Sig: Take 1 tablet (40 mg total) by mouth in the morning  Facility-Administered Medications: None       Past Medical History:   Diagnosis Date    Abdominal pain     MARANDA (acute kidney injury) ([facility] [address] ) 6/19/2018    Cardiac disease     CHF (congestive heart failure) (HCC)     COPD, severe (HCC)     Coronary artery disease     DDD (degenerative disc disease), lumbar 9/1/2020    Diabetes mellitus ([facility] [address] )     Dyspnea     GERD (gastroesophageal reflux disease)     Hyperlipidemia     Hypertension     MI (myocardial infarction) ([facility] [address] )     with 3 stents    Nodule of apex of right lung     JACQUELINE (obstructive sleep apnea)     Prostate cancer [facility])        Past Surgical History:   Procedure Laterality Date    ABDOMINAL SURGERY      exploratory    ANGIOPLASTY      3 stents    APPENDECTOMY      COLONOSCOPY  2015    CT NEEDLE BIOPSY LUNG  11/3/2020    ESOPHAGOGASTRODUODENOSCOPY N/A 10/2/2017    Procedure: ESOPHAGOGASTRODUODENOSCOPY (EGD); Surgeon: Marleny Kwon MD;  Location: [facility];   Service: Gastroenterology    IR THORACENTESIS  11/3/2020    KNEE CARTILAGE SURGERY      OTHER SURGICAL HISTORY      stent placement    PROSTATE SURGERY      SKIN GRAFT      Basal cell CA back       Family History   Problem Relation Age of Onset    Heart disease Father     Other Father         Mesothelioma      I have reviewed and agree with the history as documented  E-Cigarette/Vaping    E-Cigarette Use Never User      E-Cigarette/Vaping Substances    Nicotine No     THC No     CBD No     Flavoring No     Other No     Unknown No      Social History     Tobacco Use    Smoking status: Former Smoker     Packs/day: 1 50     Years: 50 00     Pack years: 75 00     Start date: 38     Quit date: 2020     Years since quittin 8    Smokeless tobacco: Never Used   Vaping Use    Vaping Use: Never used   Substance Use Topics    Alcohol use: Never    Drug use: No        Review of Systems   Constitutional: Negative for chills and fever  HENT: Negative for hearing loss  Eyes: Negative for visual disturbance  Respiratory: Positive for shortness of breath  Cardiovascular: Positive for chest pain  Gastrointestinal: Positive for nausea  Negative for abdominal pain, constipation, diarrhea and vomiting  Genitourinary: Negative for difficulty urinating  Musculoskeletal: Negative for myalgias  Skin: Negative for rash  Neurological: Negative for dizziness  Psychiatric/Behavioral: Negative for agitation  All other systems reviewed and are negative        Physical Exam  ED Triage Vitals   Temperature Pulse Respirations Blood Pressure SpO2   22   99 4 °F (37 4 °C) 100 22 164/80 95 %      Temp Source Heart Rate Source Patient Position - Orthostatic VS BP Location FiO2 (%)   22 --   Oral Monitor Lying Right arm       Pain Score       22       8             Orthostatic Vital Signs  Vitals:    22 0008 22 0244 22 0754   BP: (!) 175/74 139/61 (!) 172/76 (!) 175/75   Pulse: 97 74 81 72   Patient Position - Orthostatic VS: Lying Lying         Physical Exam  Vitals and nursing note reviewed  Constitutional:       General: He is not in acute distress  Appearance: Normal appearance  He is obese  He is not ill-appearing  HENT:      Head: Normocephalic and atraumatic  Nose: Nose normal  No congestion  Mouth/Throat:      Mouth: Mucous membranes are moist       Pharynx: Oropharynx is clear  No oropharyngeal exudate  Eyes:      General:         Right eye: No discharge  Left eye: No discharge  Extraocular Movements: Extraocular movements intact  Conjunctiva/sclera: Conjunctivae normal       Pupils: Pupils are equal, round, and reactive to light  Cardiovascular:      Rate and Rhythm: Normal rate and regular rhythm  Pulses: Normal pulses  Heart sounds: Normal heart sounds  No murmur heard  Pulmonary:      Effort: Pulmonary effort is normal       Breath sounds: Normal breath sounds  Abdominal:      General: Abdomen is flat  Bowel sounds are normal  There is no distension  Palpations: Abdomen is soft  Tenderness: There is no abdominal tenderness  Musculoskeletal:         General: Normal range of motion  Cervical back: Normal range of motion  No rigidity  Right lower leg: No edema  Left lower leg: No edema  Skin:     General: Skin is warm and dry  Capillary Refill: Capillary refill takes less than 2 seconds  Neurological:      General: No focal deficit present  Mental Status: He is alert and oriented to person, place, and time  Mental status is at baseline  Motor: No weakness     Psychiatric:         Mood and Affect: Mood normal          Behavior: Behavior normal          ED Medications  Medications   aspirin tablet 325 mg (325 mg Oral Given 5/21/22 2126)   nitroglycerin (NITROSTAT) SL tablet 0 4 mg (0 4 mg Sublingual Given 5/21/22 2127)   fentanyl citrate (PF) 100 MCG/2ML 50 mcg (50 mcg Intravenous Given 5/21/22 0351) Diagnostic Studies  Results Reviewed     Procedure Component Value Units Date/Time    HS Troponin I 4hr [[id number]]  (Normal) Collected: 05/22/22 0214    Lab Status: Final result Specimen: Blood from Arm, Left Updated: 05/22/22 0304     hs TnI 4hr 14 ng/L      Delta 4hr hsTnI 0 ng/L     HS Troponin I 2hr [[id number]]  (Normal) Collected: 05/21/22 2359    Lab Status: Final result Specimen: Blood from Arm, Left Updated: 05/22/22 0110     hs TnI 2hr 13 ng/L      Delta 2hr hsTnI -1 ng/L     COVID/FLU/RSV [[id number]]  (Normal) Collected: 05/21/22 2149    Lab Status: Final result Specimen: Nares from Nose Updated: 05/21/22 2244     SARS-CoV-2 Negative     INFLUENZA A PCR Negative     INFLUENZA B PCR Negative     RSV PCR Negative    Narrative:      FOR PEDIATRIC PATIENTS - copy/paste COVID Guidelines URL to browser: [email or web address]  [employer]    SARS-CoV-2 assay is a Nucleic Acid Amplification assay intended for the  qualitative detection of nucleic acid from SARS-CoV-2 in nasopharyngeal  swabs  Results are for the presumptive identification of SARS-CoV-2 RNA  Positive results are indicative of infection with SARS-CoV-2, the virus  causing COVID-19, but do not rule out bacterial infection or co-infection  with other viruses  Laboratories within the [place] and its  territories are required to report all positive results to the appropriate  public health authorities  Negative results do not preclude SARS-CoV-2  infection and should not be used as the sole basis for treatment or other  patient management decisions  Negative results must be combined with  clinical observations, patient history, and epidemiological information  This test has not been FDA cleared or approved  This test has been authorized by FDA under an Emergency Use Authorization  (EUA)   This test is only authorized for the duration of time the  declaration that circumstances exist justifying the authorization of the  emergency use of an in vitro diagnostic tests for detection of SARS-CoV-2  virus and/or diagnosis of COVID-19 infection under section 564(b)(1) of  the Act, 21 U  S C  136YRG-5(R)(9), unless the authorization is terminated  or revoked sooner  The test has been validated but independent review by FDA  and CLIA is pending  Test performed using [employer] GeneXpert: This RT-PCR assay targets N2,  a region unique to SARS-CoV-2  A conserved region in the E-gene was chosen  for pan-Sarbecovirus detection which includes SARS-CoV-2      HS Troponin 0hr (reflex protocol) [[id number]]  (Normal) Collected: 05/21/22 2122    Lab Status: Final result Specimen: Blood from Arm, Left Updated: 05/21/22 2215     hs TnI 0hr 14 ng/L     Comprehensive metabolic panel [[id number]]  (Abnormal) Collected: 05/21/22 2122    Lab Status: Final result Specimen: Blood from Arm, Left Updated: 05/21/22 2159     Sodium 136 mmol/L      Potassium 4 4 mmol/L      Chloride 104 mmol/L      CO2 25 mmol/L      ANION GAP 7 mmol/L      BUN 24 mg/dL      Creatinine 1 57 mg/dL      Glucose 352 mg/dL      Calcium 8 4 mg/dL      Corrected Calcium 9 8 mg/dL      AST 11 U/L      ALT 18 U/L      Alkaline Phosphatase 92 U/L      Total Protein 7 1 g/dL      Albumin 2 3 g/dL      Total Bilirubin 0 19 mg/dL      eGFR 42 ml/min/1 73sq m     Narrative:      Sh[facility]zad guidelines for Chronic Kidney Disease (CKD):     Stage 1 with normal or high GFR (GFR > 90 mL/min/1 73 square meters)    Stage 2 Mild CKD (GFR = 60-89 mL/min/1 73 square meters)    Stage 3A Moderate CKD (GFR = 45-59 mL/min/1 73 square meters)    Stage 3B Moderate CKD (GFR = 30-44 mL/min/1 73 square meters)    Stage 4 Severe CKD (GFR = 15-29 mL/min/1 73 square meters)    Stage 5 End Stage CKD (GFR <15 mL/min/1 73 square meters)  Note: GFR calculation is accurate only with a steady state creatinine    CBC and differential [[id number]]  (Abnormal) Collected: 05/21/22 2122    Lab Status: Final result Specimen: Blood from Arm, Left Updated: 05/21/22 2132     WBC 9 18 Thousand/uL      RBC 4 75 Million/uL      Hemoglobin 12 1 g/dL      Hematocrit 38 7 %      MCV 82 fL      MCH 25 5 pg      MCHC 31 3 g/dL      RDW 15 7 %      MPV 11 0 fL      Platelets 581 Thousands/uL      nRBC 0 /100 WBCs      Neutrophils Relative 78 %      Immat GRANS % 1 %      Lymphocytes Relative 13 %      Monocytes Relative 7 %      Eosinophils Relative 1 %      Basophils Relative 0 %      Neutrophils Absolute 7 18 Thousands/µL      Immature Grans Absolute 0 06 Thousand/uL      Lymphocytes Absolute 1 20 Thousands/µL      Monocytes Absolute 0 65 Thousand/µL      Eosinophils Absolute 0 07 Thousand/µL      Basophils Absolute 0 02 Thousands/µL                  X-ray chest 1 view portable   ED Interpretation by Kevin Bell MD (05/21 2142)   Partial lobectomy on the right  Otherwise no acute pulmonary disease      Final Result by Erin Benavides MD (05/22 1016)      Decreased right lung volume with chronic effusion and post lobectomy changes with unchanged left lung  Workstation performed: [id number]               Procedures  Procedures      ED Course  ED Course as of 05/22/22 2156   Sat May 21, 2022   2127 Procedure Note: EKG  Date/Time: 05/21/22 9:27 PM   Interpreted by: Romana Gall   Indications / Diagnosis: CP  ECG reviewed by me, the ED Provider: yes   The EKG demonstrates:  Rhythm: normal sinus  Intervals: normal intervals  Axis: normal axis  QRS/Blocks: RBBB  ST Changes: No acute ST Changes, no STD/VINI                   HEART Risk Score    Flowsheet Row Most Recent Value   Heart Score Risk Calculator    History 1 Filed at: 05/21/2022 2256   ECG 0 Filed at: 05/21/2022 2256   Age 2 Filed at: 05/21/2022 2256   Risk Factors 2 Filed at: 05/21/2022 2256   Troponin 1 Filed at: 05/21/2022 2256   HEART Score 6 Filed at: 05/21/2022 2256                      SBIRT 22yo+    Flowsheet Row Most Recent Value   SBIRT (25 yo +)    In order to provide better care to our patients, we are screening all of our patients for alcohol and drug use  Would it be okay to ask you these screening questions? No Filed at: 05/21/2022 2114        MIKKI Risk Score    Flowsheet Row Most Recent Value   Age >= 72 1 Filed at: 05/22/2022 0204   Known CAD (stenosis >= 50%) 0 Filed at: 05/22/2022 0204   Recent (<=24 hrs) Service Angina 0 Filed at: 05/22/2022 0204   ST Deviation >= 0 5 mm 0 Filed at: 05/22/2022 0204   3+ CAD Risk Factors (FHx, HTN, HLP, DM, Smoker) 1 Filed at: 05/22/2022 6929   Aspirin Use Past 7 Days 1 Filed at: 05/22/2022 9956   Elevated Cardiac Markers 0 Filed at: 05/22/2022 1674   MIKKI Risk Score (Calculated) 3 Filed at: 05/22/2022 0204              MDM  Number of Diagnoses or Management Options  Chest pain  Diagnosis management comments: 79-year-old male presenting to ED today for chest pressure  At this time concern for ACS versus respiratory pathology  Will evaluate with a CBC, CMP, troponin, 12 lead EKG, chest x-ray  Was admitted to due to High HEART score and no relief of pain with fentanyl and nitrates         Disposition  Final diagnoses:   Chest pain     Time reflects when diagnosis was documented in both MDM as applicable and the Disposition within this note     Time User Action Codes Description Comment    5/22/2022  2:01 AM Joi Redding Add [R07 9] Chest pain     5/22/2022 11:09 AM Marcia Workman [I10] Essential hypertension     5/22/2022 11:09 AM Marcia Workman [J96 11,  J96 12] Chronic respiratory failure with hypoxia and hypercapnia ([facility] [address] )     5/22/2022 11:09 AM Marcia Workman [I25 10] Coronary artery disease involving native coronary artery of native heart without angina pectoris     5/22/2022 11:09 AM Marcia Workman [I50 33] Acute on chronic diastolic (congestive) heart failure       ED Disposition     ED Disposition   Admit    Condition   Stable    Date/Time   Sun May 22, 2022  2:01 AM    Comment   Case was discussed with Dr Zafar Brooks and the patient's admission status was agreed to be Admission Status: observation status to the service of Dr Zafar Brooks   Follow-up Information     Follow up With Specialties Details Why Iris Mathur MD Family Medicine Follow up Please follow up with your primary care provider within one week of discharge [address]  [id number] [address] [address]  1 [facility], [address] [place] [address], Pulmonary Disease, Pulmonology, Intensive Care Follow up Follow up with pulmonology on discharge [address]  [phone]            Discharge Medication List as of 5/22/2022  2:37 PM      CONTINUE these medications which have CHANGED    Details   carvedilol (COREG) 6 25 mg tablet Take 1 tablet (6 25 mg total) by mouth in the morning and 1 tablet (6 25 mg total) in the evening  Take with meals  , Starting Sun 5/22/2022, Normal      furosemide (LASIX) 40 mg tablet Take 1 tablet (40 mg total) by mouth in the morning , Starting Sun 5/22/2022, Normal      lisinopril (ZESTRIL) 10 mg tablet Take 1 tablet (10 mg total) by mouth in the morning , Starting Sun 5/22/2022, Normal      potassium chloride (K-DUR,KLOR-CON) 20 mEq tablet Take 1 tablet (20 mEq total) by mouth in the morning , Starting Sun 5/22/2022, Normal         CONTINUE these medications which have NOT CHANGED    Details   aspirin (ECOTRIN LOW STRENGTH) 81 mg EC tablet Take 1 tablet (81 mg total) by mouth daily, Starting Sun 5/8/2022, Normal      BD Insulin Syringe U-500 31G X 6MM 0 5 ML MISC USE 1 SYRINGE THREE TIMES A DAY FOR INJECTING INSULIN, Normal      celecoxib (CeleBREX) 200 mg capsule Take 1 capsule (200 mg total) by mouth in the morning , Starting Tue 5/17/2022, Normal      cyclobenzaprine (FLEXERIL) 10 mg tablet Take 1 tablet (10 mg total) by mouth in the morning and 1 tablet (10 mg total) before bedtime  , Starting Tue 5/17/2022, Normal      ferrous sulfate 325 (65 Fe) mg tablet Take 1 tablet (325 mg total) by mouth daily with breakfast, Starting Tue 5/17/2022, Normal      fluticasone-umeclidinium-vilanterol (Trelegy Ellipta) 100-62 5-25 MCG/INH inhaler Inhale 1 puff  in the morning  Rinse mouth after use   , Starting Tue 5/17/2022, Until Thu 6/16/2022, Normal      glucose blood (OneTouch Verio) test strip May substitute brand based on insurance coverage  Check glucose QID , Normal      insulin regular (HUMULIN R) 500 units/mL CONCENTRATED injection Inject 0 09 mL (45 Units total) under the skin in the morning and 0 09 mL (45 Units total) at noon and 0 09 mL (45 Units total) in the evening  Inject before meals  , Starting Tue 5/17/2022, Normal      ipratropium-albuterol (DUO-NEB) 0 5-2 5 mg/3 mL nebulizer solution Take 3 mL by nebulization 3 (three) times a day, Starting Tue 5/3/2022, Normal      simvastatin (ZOCOR) 40 mg tablet Take 1 tablet (40 mg total) by mouth in the morning , Starting Tue 5/17/2022, Normal           Outpatient Discharge Orders   Discharge Diet     Activity as tolerated       PDMP Review       Value Time User    PDMP Reviewed  Yes 5/5/2022 11:38 PM Aime Lees DO           ED Provider  Attending physically available and evaluated Ronnigaudencio Adonis Raymundo    I managed the patient along with the ED Attending      Electronically Signed by         Alvaro Melchor MD  05/22/22 0921

## 2022-05-22 NOTE — H&P
[address] [facility]  H&P- David Duckworth Sr  1945, 78 y o  male MRN: [id number]  Unit/Bed#: ED 11 Encounter: [id number]  Primary Care Provider: Jane Singer MD   Date and time admitted to hospital: 5/21/2022  9:00 PM    * Atypical chest pain  Assessment & Plan  · MIKKI 3 on admission, patient presenting with recurrence of right-sided chest pain quite reproducible on palpation  No response to ASA/nitro/fentanyl  · Troponin x2 negative, EKG without evidence of acute ischemia  · Notably with negative nuclear stress test at last admission, seen in conjunction with Cardiology  · Suspect this is musculoskeletal in etiology, however will obtain final troponin  · Trial urgent patch, p r n  pain control    Will need to discharge on better pain control regimen once confirmed noncardiac    COPD, severe (HCC)  Assessment & Plan  · Not in acute exacerbation, continue home inhalers    Chronic diastolic heart failure (HCC)  Assessment & Plan  Wt Readings from Last 3 Encounters:   05/17/22 98 9 kg (218 lb)   05/08/22 103 kg (226 lb)   05/03/22 99 7 kg (219 lb 12 8 oz)     · Does not volume overloaded, has missed low-dose Lasix for past 2 days as he ran out  · Restart home dose Lasix, continue other cardiac medications  · Monitor daily weights, I/O, volume status      CKD (chronic kidney disease) stage 3, GFR 30-59 ml/min [facility])  Assessment & Plan  Lab Results   Component Value Date    EGFR 42 05/21/2022    EGFR 63 05/08/2022    EGFR 62 05/05/2022    CREATININE 1 57 (H) 05/21/2022    CREATININE 1 13 05/08/2022    CREATININE 1 15 05/05/2022   · Creatinine above recent discharge values however prior baseline was noted 1 2-1 5  · Monitor with BMP    Chronic respiratory failure with hypoxia (HCC)  Assessment & Plan  · On 2 L NC continuously, continue and titrate as needed to maintain SaO2 greater than 88%    Type 2 diabetes mellitus with hyperglycemia, with long-term current use of insulin [facility])  Assessment & Plan  Lab Results   Component Value Date    HGBA1C 9 2 (H) 04/22/2022       No results for input(s): POCGLU in the last 72 hours  Blood Sugar Average: Last 72 hrs:  ·  not controlled with hyperglycemia on presentation  · Continue U500 insulin 45 units t i d  with meals, start correctional insulin with Accu-Cheks  · Carb controlled diet  · Initiate hypoglycemia protocol        VTE Prophylaxis: Heparin  / sequential compression device   Code Status:  Full code  POLST: POLST form is not discussed and not completed at this time  Anticipated Length of Stay:  Patient will be admitted on an Observation basis with an anticipated length of stay of  less than 2 midnights  Justification for Hospital Stay: Please see detailed plans noted above  Chief Complaint:     Chest pain  History of Present Illness:  Maurice Guillen Sr  is a 78 y o  male who presents with recurrence of right side chest wall pain  Has had several recent admissions for similar, including most recently 05/05/2022-05/08/2022 where he was seen by Cardiology and underwent nuclear stress test during that admission which was negative for ischemia  Had been doing well since this time, however this evening while sitting on couch developed right-sided chest pain without further radiation described as sharp and nonradiating, not associated with exertion, and not associated with shortness of breath, nausea/vomiting, or diaphoresis  He denies any recent illnesses, shortness of breath worse than baseline, worsening cough/wheezing, weight gain, worsening edema, or dizziness/lightheadedness  During ED evaluation troponin x2 were negative and EKG without acute ischemic change however chest pain was persistent despite full-dose ASA, nitroglycerin, and fentanyl and patient requesting admission as observation for further evaluation    He continues to endorse chest pain during my evaluation as described above, 6/10, although was sleeping on my arrival to room  Review of Systems:    Constitutional:  Denies fever or chills   Eyes:  Denies change in visual acuity   HENT:  Denies nasal congestion or sore throat   Respiratory:  Denies cough or shortness of breath   Cardiovascular:  Denies edema but endorses chest pain  GI:  Denies abdominal pain, nausea, vomiting, bloody stools or diarrhea   :  Denies dysuria   Musculoskeletal:  Denies back pain or joint pain   Integument:  Denies rash   Neurologic:  Denies headache, focal weakness or sensory changes   Endocrine:  Denies polyuria or polydipsia   Lymphatic:  Denies swollen glands   Psychiatric:  Denies depression or anxiety     Past Medical and Surgical History:   Past Medical History:   Diagnosis Date    Abdominal pain     [facility]/2018    Cardiac disease     CH[facility]congestive [facility] failure) [facility]     COPD, severe (HCC)     Coronary artery disease     DDD (degenerative disc disease), lumbar 9/1/2020    Diabetes mellitus ([facility] )     Dyspnea     GERD (gastroesophageal reflux disease)     Hyperlipidemia     Hypertension     MI (myocardial infarction) ([facility] )     with 3 stents    Nodule of apex of right lung     JACQUELINE (obstructive sleep apnea)     Prostate cancer [facility])      Past Surgical History:   Procedure Laterality Date    ABDOMINAL SURGERY      exploratory    ANGIOPLASTY      3 stents    APPENDECTOMY      COLONOSCOPY  2015    CT NEEDLE BIOPSY LUNG  11/3/2020    ESOPHAGOGASTRODUODENOSCOPY N/A 10/2/2017    Procedure: ESOPHAGOGASTRODUODENOSCOPY (EGD); Surgeon: Katarzyna Espinal MD;  [facility]: [facility]; Service: Gastroenterology    IR THORACENTESIS  11/3/2020    KNEE CARTILAGE SURGERY      OTHER SURGICAL HISTORY      stent placement    PROSTATE SURGERY      SKIN GRAFT      Basal cell CA back       Meds/Allergies:  No current facility-administered medications for this encounter      Current Outpatient Medications:     aspirin (ECOTRIN LOW STRENGTH) 81 mg EC tablet, Take 1 tablet (81 mg total) by mouth daily, Disp: 30 tablet, Rfl: 2    BD Insulin Syringe U-500 31G X 6MM 0 5 ML MISC, USE 1 SYRINGE THREE TIMES A DAY FOR INJECTING INSULIN, Disp: 100 each, Rfl: 5    carvedilol (COREG) 6 25 mg tablet, Take 1 tablet (6 25 mg total) by mouth in the morning and 1 tablet (6 25 mg total) in the evening  Take with meals  , Disp: 60 tablet, Rfl: 5    celecoxib (CeleBREX) 200 mg capsule, Take 1 capsule (200 mg total) by mouth in the morning , Disp: 30 capsule, Rfl: 5    cyclobenzaprine (FLEXERIL) 10 mg tablet, Take 1 tablet (10 mg total) by mouth in the morning and 1 tablet (10 mg total) before bedtime  , Disp: 60 tablet, Rfl: 5    ferrous sulfate 325 (65 Fe) mg tablet, Take 1 tablet (325 mg total) by mouth daily with breakfast, Disp: 30 tablet, Rfl: 5    fluticasone-umeclidinium-vilanterol (Trelegy Ellipta) 100-62 5-25 MCG/INH inhaler, Inhale 1 puff  in the morning  Rinse mouth after use   , Disp: 1 each, Rfl: 5    furosemide (LASIX) 40 mg tablet, Take 1 tablet (40 mg total) by mouth in the morning , Disp: 30 tablet, Rfl: 5    glucose blood (OneTouch Verio) test strip, May substitute brand based on insurance coverage  Check glucose QID , Disp: 100 each, Rfl: 0    insulin regular (HUMULIN R) 500 units/mL CONCENTRATED injection, Inject 0 09 mL (45 Units total) under the skin in the morning and 0 09 mL (45 Units total) at noon and 0 09 mL (45 Units total) in the evening  Inject before meals  , Disp: 40 mL, Rfl: 5    ipratropium-albuterol (DUO-NEB) 0 5-2 5 mg/3 mL nebulizer solution, Take 3 mL by nebulization 3 (three) times a day (Patient not taking: Reported on 5/17/2022), Disp: 3 mL, Rfl: 0    lisinopril (ZESTRIL) 10 mg tablet, Take 1 tablet (10 mg total) by mouth in the morning , Disp: 30 tablet, Rfl: 5    potassium chloride (K-DUR,KLOR-CON) 20 mEq tablet, Take 1 tablet (20 mEq total) by mouth in the morning , Disp: 30 tablet, Rfl: 5    simvastatin (ZOCOR) 40 mg tablet, Take 1 tablet (40 mg total) by mouth in the morning , Disp: 30 tablet, Rfl: 5    Allergies: Allergies   Allergen Reactions    Crestor [Rosuvastatin] Other (See Comments)     Unknown      Metformin GI Intolerance     History:  Marital Status:      Substance Use History:   Social History     Substance and Sexual Activity   Alcohol Use Never     Social History     Tobacco Use   Smoking Status Former Smoker    Packs/day: 1 50    Years: 50 00    Pack years: 75 00    Start date: 38    Quit date: 2020    Years since quittin 8   Smokeless Tobacco Never Used     Social History     Substance and Sexual Activity   Drug Use No       Family History:  Family History   Problem Relation Age of Onset    Heart disease Father     Other Father         Mesothelioma        Physical Exam:     Vitals:   Blood Pressure: 139/61 (22)  Pulse: 74 (22)  Temperature: 99 4 °F (37 4 °C) (22)  Temp Source: Oral (22)  Respirations: 20 (22)  SpO2: 93 % (22)    Constitutional:  Well developed, obese, no acute distress, non-toxic appearance   Eyes:  PERRL, conjunctiva normal   HENT:  Atraumatic, external ears normal, nose normal, oropharynx moist, no pharyngeal exudates  Neck- normal range of motion, no tenderness, supple   Respiratory:  No respiratory distress, normal breath sounds, no rales, no wheezing   Cardiovascular:  Normal rate, normal rhythm, no murmurs, no gallops, no rubs   GI:  Soft, nondistended, normal bowel sounds, nontender, no organomegaly, no mass, no rebound, no guarding   :  No costovertebral angle tenderness   Musculoskeletal:  No edema, right chest wall tender to palpation, no deformities   Back- no tenderness  Integument:  Well hydrated, bilateral lower extremity chronic venous stasis changes   Lymphatic:  No lymphadenopathy noted   Neurologic:  Alert &awake, communicative, CN 2-12 normal, normal motor function, normal sensory function, no focal deficits noted   Psychiatric:  Speech and behavior appropriate       Lab Results: I have personally reviewed pertinent reports  Results from last 7 days   Lab Units 05/21/22 2122   WBC Thousand/uL 9 18   HEMOGLOBIN g/dL 12 1   HEMATOCRIT % 38 7   PLATELETS Thousands/uL 155   NEUTROS PCT % 78*   LYMPHS PCT % 13*   MONOS PCT % 7   EOS PCT % 1     Results from last 7 days   Lab Units 05/21/22 2122   POTASSIUM mmol/L 4 4   CHLORIDE mmol/L 104   CO2 mmol/L 25   BUN mg/dL 24   CREATININE mg/dL 1 57*   CALCIUM mg/dL 8 4   ALK PHOS U/L 92   ALT U/L 18   AST U/L 11           EKG: Wide QRS rhythm , RBBB, LPFB    Imaging: I have personally reviewed pertinent reports  and I have personally reviewed pertinent films in PACS     CXR 05/21/2022:  Personally reviewed, no acute cardiopulmonary disease however chronic pleural parenchymal changes right lower lobe suspected atelectasis/possible small pleural effusion again visualized but not markedly changed from prior  Final radiologist read is pending  XR chest 2 views    Result Date: 5/6/2022  Narrative: CHEST INDICATION:   R chest pain, rales  COMPARISON:  Chest x-ray 5/1/2022, CT chest 5/2/2022 EXAM PERFORMED/VIEWS:  XR CHEST PA & LATERAL FINDINGS: Cardiomediastinal silhouette appears unremarkable  Stable volume loss on the right  There is right apical scarring  Increased hazy density in the right lower lung zone, stable may represent pleural parenchymal changes and possible small effusion  Left lung is clear  Recent chest CT noted airspace opacities in the left upper lobe, not appreciated on this chest x-ray exam  Osseous structures appear within normal limits for patient age  Impression: 1  No acute pulmonary process  Recent chest CT noted airspace opacities in the left upper lobe, not appreciated on this chest x-ray exam  2   Chronic pleural parenchymal changes in the right lower lung zone with possible small effusion  Workstation performed: [id number]     XR chest 2 views    Result Date: 5/2/2022  Narrative: CHEST INDICATION:   Chest pain, dyspnea  COMPARISON:  4/21/2022, CT chest, abdomen and pelvis 3/29/2022 EXAM PERFORMED/VIEWS:  XR CHEST AP & LATERAL Images: 3 FINDINGS:  The patient is rotated to the right  Cardiomediastinal silhouette appears stable  The right heart border is poorly defined  Linear atelectasis or scarring right upper lobe  Increased density right base likely a combination of pleural effusion and/or atelectasis, unchanged  The left lung is clear, noting emphysematous changes  Osseous structures appear within normal limits for patient age  Impression: Stable chest  Increased density right base likely a combination of pleural effusion and/or atelectasis, unchanged  Workstation performed: [id number]     CT chest wo contrast    Addendum Date: 5/2/2022 Addendum:   ADDENDUM: Airspace opacities within the left upper lobe which may represent infection  Recommend short-term follow-up chest CT scan in 3 months to evaluate for resolution  Result Date: 5/2/2022  Narrative: CT CHEST WITHOUT IV CONTRAST INDICATION:   Abnormal xray - lung opacity/opacities SOB, Hx Lung Cancer, R sided effsuion please evaluate if worsened from previous studies  COMPARISON:  March 29, 2022 TECHNIQUE: CT examination of the chest was performed without intravenous contrast   Axial, sagittal, and coronal 2D reformatted images were created from the source data and submitted for interpretation  Radiation dose length product (DLP) for this visit:  663 05 mGy-cm   This examination, like all CT scans performed in the [facility], was performed utilizing techniques to minimize radiation dose exposure, including the use of iterative  reconstruction and automated exposure control  FINDINGS: LUNGS:  Chronic right lung volume loss with linear scarring in the right upper lobe and lingula is again seen    Unchanged right basilar round atelectasis  Scattered airspace opacities within the left upper lobe are seen  Unchanged pulmonary emphysema is seen  PLEURA:  Right-sided pleural effusion is seen  HEART/GREAT VESSELS: Heavy atherosclerotic coronary artery calcification is noted  Heart is otherwise unremarkable  No thoracic aortic aneurysm  MEDIASTINUM AND JEREMÍAS:  Mediastinum is shifted to the right secondary to chronic lung volume loss CHEST WALL AND LOWER NECK:  Unremarkable  VISUALIZED STRUCTURES IN THE UPPER ABDOMEN:  Unremarkable  OSSEOUS STRUCTURES:  No acute fracture or destructive osseous lesion  Impression: Airspace opacities within the left upper lobe which may represent  Recommend short-term follow-up chest CT scan in 3 months to evaluate for resolution  The study was marked in [facility] for immediate notification  Workstation performed: [id number]     Stress strip    Result Date: 5/10/2022  Narrative: Confirmed by Sangeeta Reyes ([id number]),  Bita Treadwell ([id number]) on 5/10/2022 10:00:02 AM    Echo complete w/ contrast if indicated    Result Date: 5/8/2022  Narrative: Kamryn Pederson  Left Ventricle: Left ventricular cavity size is normal  Wall thickness is increased  The left ventricular ejection fraction is 60%  Systolic function is normal  Wall motion is normal  Diastolic function is normal    Right Ventricle: Right ventricular cavity size is mildly dilated  Systolic function is normal    Right Atrium: The atrium is mildly dilated    Aortic Valve: There is mild regurgitation    Tricuspid Valve: There is mild regurgitation  NM myocardial perfusion spect (rx stress and/or rest)    Result Date: 5/8/2022  Narrative: Kamryn Pederson  Stress ECG: The ECG was not diagnostic due to pharmacological (vasodilator) stress     Perfusion: There is a left ventricular perfusion defect that is medium in size present in the basal to mid inferior location(s) that is paradoxical    Stress Function: Left ventricular function post-stress is normal  Post-stress ejection fraction is 61 %  No ischemia or scar  [id number]%         ** Please Note: Dragon 360 Dictation voice to text software was used in the creation of this document   **

## 2022-05-22 NOTE — ASSESSMENT & PLAN NOTE
· MIKKI 3 on admission, patient presenting with recurrence of right-sided chest pain quite reproducible on palpation  No response to ASA/nitro/fentanyl  · Troponin x3 negative, EKG without evidence of acute ischemia  · Notably with negative nuclear stress test at last admission, seen in conjunction with Cardiology  · Suspect this is musculoskeletal in etiology vs pulmonary/pleuritic with chronic lung disease with fibrosis/atelectasis/COPD  · PRN pain control with home Flexeril, recommend OTC lidocaine patch    Recommend close f/u with pulmonology as suspect primary pulmonary cause to his pain

## 2022-05-22 NOTE — DISCHARGE INSTRUCTIONS
Suspect your chest pain to be related to underlying lung disease/scarring as cardiac work up was normal   Please follow up with your primary care provider and pulmonologist on discharge

## 2022-05-22 NOTE — ASSESSMENT & PLAN NOTE
Wt Readings from Last 3 Encounters:   05/17/22 98 9 kg (218 lb)   05/08/22 103 kg (226 lb)   05/03/22 99 7 kg (219 lb 12 8 oz)     · Does not volume overloaded, has missed low-dose Lasix for past 2 days as he ran out  · Restart home dose Lasix, continue other cardiac medications  · Monitor daily weights, I/O, volume status

## 2022-05-22 NOTE — ASSESSMENT & PLAN NOTE
Lab Results   Component Value Date    EGFR 42 05/21/2022    EGFR 63 05/08/2022    EGFR 62 05/05/2022    CREATININE 1 57 (H) 05/21/2022    CREATININE 1 13 05/08/2022    CREATININE 1 15 05/05/2022   · Creatinine above recent discharge values however prior baseline was noted 1 2-1 5  · Monitor with BMP

## 2022-05-22 NOTE — ASSESSMENT & PLAN NOTE
Lab Results   Component Value Date    HGBA1C 9 2 (H) 04/22/2022       No results for input(s): POCGLU in the last 72 hours      Blood Sugar Average: Last 72 hrs:  ·  not controlled with hyperglycemia on presentation  · Continue U500 insulin 45 units t i d  with meals, start correctional insulin with Accu-Cheks  · Carb controlled diet  · Initiate hypoglycemia protocol

## 2022-05-22 NOTE — ASSESSMENT & PLAN NOTE
Wt Readings from Last 3 Encounters:   05/17/22 98 9 kg (218 lb)   05/08/22 103 kg (226 lb)   05/03/22 99 7 kg (219 lb 12 8 oz)     · Does not appear volume overloaded, has missed low-dose Lasix for past 2 days as he ran out  · Restart home dose Lasix, continue other cardiac medications -- provided prescription on d/c, patient has in his hand delivered from pharmacy   · Monitor daily weights, I/O, volume status

## 2022-05-22 NOTE — ED ATTENDING ATTESTATION
5/21/2022  I, Martha Robert MD, saw and evaluated the patient  I have discussed the patient with the resident/non-physician practitioner and agree with the resident's/non-physician practitioner's findings, Plan of Care, and MDM as documented in the resident's/non-physician practitioner's note, except where noted  All available labs and Radiology studies were reviewed  I was present for key portions of any procedure(s) performed by the resident/non-physician practitioner and I was immediately available to provide assistance  At this point I agree with the current assessment done in the Emergency Department    I have conducted an independent evaluation of this patient a history and physical is as follows:  Cp   Known   Cad   Stents   In 2015   Had stress test this month   Chest right sided pressure  While watching TV  Mild   Sob    No radiation to the arm   Nausea  No sweating   No cough  No leg pain or swelling   Exam nad Heent normal lungs clear heart rrr no m abd soft nt nd pos bs   Ext normal   Imp  CP   ED Course         Critical Care Time  Procedures

## 2022-05-23 NOTE — UTILIZATION REVIEW
Observation Admission Authorization Request   NOTIFICATION OF OBSERVATION ADMISSION/OBSERVATION AUTHORIZATION REQUEST   SERVICING FACILITY:   [facility]  Address: [address] [place] [address], [address] [place] [address]  Tax ID: [id number]  NPI: [id number]  Place of Service: On [address] [place] [address] Code: 22  CPT Code for Observation: CPT   CPT 08275     ATTENDING PROVIDER:  Attending Name and NPI#: Lilian Herrera [[id number]]  Address: [address] [place] [address], [address] [place] [id number]  Phone: [phone]     UTILIZATION REVIEW CONTACT:  Bruce Connolly Utilization   Network Utilization Review Department  Phone: [phone]  Fax: [phone]  Email: [email or web address]     PHYSICIAN ADVISORY SERVICES:  FOR [facility] REVIEW - MEDICAL NECESSITY DENIAL  Phone: [phone]  Fax: [phone]  Email: [email or web address]     TYPE OF REQUEST:  Observation Status     ADMISSION INFORMATION:  ADMISSION DATE/TIME: PATIENT DIAGNOSIS CODE/DESCRIPTION:  Chest pain [R07 9]  DISCHARGE DATE/TIME: 5/22/2022  5:00 PM   IMPORTANT INFORMATION:  Please contact the [place] directly with any questions or concerns regarding this request  Department voicemails are confidential     Send requests for admission clinical reviews, concurrent reviews, approvals, and administrative denials due to lack of clinical to fax [phone]

## 2022-05-24 LAB
ATRIAL RATE: 102 BPM
ATRIAL RATE: 75 BPM
P AXIS: 125 DEGREES
PR INTERVAL: 280 MS
QRS AXIS: 116 DEGREES
QRS AXIS: 177 DEGREES
QRSD INTERVAL: 140 MS
QRSD INTERVAL: 140 MS
QT INTERVAL: 386 MS
QT INTERVAL: 430 MS
QTC INTERVAL: 480 MS
QTC INTERVAL: 505 MS
T WAVE AXIS: 36 DEGREES
T WAVE AXIS: 38 DEGREES
VENTRICULAR RATE: 103 BPM
VENTRICULAR RATE: 75 BPM

## 2022-05-24 PROCEDURE — 93010 ELECTROCARDIOGRAM REPORT: CPT | Performed by: INTERNAL MEDICINE

## 2022-06-09 ENCOUNTER — APPOINTMENT (EMERGENCY)
Dept: RADIOLOGY | Facility: HOSPITAL | Age: 77
DRG: 206 | End: 2022-06-09
Payer: COMMERCIAL

## 2022-06-09 ENCOUNTER — HOSPITAL ENCOUNTER (INPATIENT)
Facility: HOSPITAL | Age: 77
LOS: 3 days | Discharge: HOME | DRG: 206 | End: 2022-06-12
Attending: EMERGENCY MEDICINE | Admitting: HOSPITALIST
Payer: COMMERCIAL

## 2022-06-09 DIAGNOSIS — Z79.4 TYPE 2 DIABETES MELLITUS WITH HYPERGLYCEMIA, WITH LONG-TERM CURRENT USE OF INSULIN (HCC): ICD-10-CM

## 2022-06-09 DIAGNOSIS — R07.9 CHEST PAIN: ICD-10-CM

## 2022-06-09 DIAGNOSIS — E11.65 TYPE 2 DIABETES MELLITUS WITH HYPERGLYCEMIA, WITH LONG-TERM CURRENT USE OF INSULIN (HCC): ICD-10-CM

## 2022-06-09 DIAGNOSIS — R06.00 DYSPNEA: Primary | ICD-10-CM

## 2022-06-09 DIAGNOSIS — J44.1 COPD WITH ACUTE EXACERBATION (HCC): ICD-10-CM

## 2022-06-09 PROBLEM — I25.10 CAD (CORONARY ARTERY DISEASE): Chronic | Status: ACTIVE | Noted: 2018-05-23

## 2022-06-09 PROBLEM — J96.01 ACUTE RESPIRATORY FAILURE WITH HYPOXIA (HCC): Status: ACTIVE | Noted: 2022-06-09

## 2022-06-09 LAB
2HR DELTA HS TROPONIN: -2 NG/L
ALBUMIN SERPL BCP-MCNC: 3.1 G/DL (ref 3.5–5)
ALP SERPL-CCNC: 97 U/L (ref 46–116)
ALT SERPL W P-5'-P-CCNC: 18 U/L (ref 12–78)
ANION GAP SERPL CALCULATED.3IONS-SCNC: 7 MMOL/L (ref 4–13)
AST SERPL W P-5'-P-CCNC: 10 U/L (ref 5–45)
BASOPHILS # BLD AUTO: 0.03 THOUSANDS/ΜL (ref 0–0.1)
BASOPHILS NFR BLD AUTO: 0 % (ref 0–1)
BILIRUB SERPL-MCNC: 0.46 MG/DL (ref 0.2–1)
BUN SERPL-MCNC: 23 MG/DL (ref 5–25)
CALCIUM ALBUM COR SERPL-MCNC: 9.9 MG/DL (ref 8.3–10.1)
CALCIUM SERPL-MCNC: 9.2 MG/DL (ref 8.3–10.1)
CARDIAC TROPONIN I PNL SERPL HS: 12 NG/L
CARDIAC TROPONIN I PNL SERPL HS: 14 NG/L
CHLORIDE SERPL-SCNC: 98 MMOL/L (ref 100–108)
CO2 SERPL-SCNC: 29 MMOL/L (ref 21–32)
CREAT SERPL-MCNC: 1.24 MG/DL (ref 0.6–1.3)
EOSINOPHIL # BLD AUTO: 0.05 THOUSAND/ΜL (ref 0–0.61)
EOSINOPHIL NFR BLD AUTO: 0 % (ref 0–6)
ERYTHROCYTE [DISTWIDTH] IN BLOOD BY AUTOMATED COUNT: 15.3 % (ref 11.6–15.1)
FLUAV RNA RESP QL NAA+PROBE: NEGATIVE
FLUBV RNA RESP QL NAA+PROBE: NEGATIVE
GFR SERPL CREATININE-BSD FRML MDRD: 56 ML/MIN/1.73SQ M
GLUCOSE SERPL-MCNC: 275 MG/DL (ref 65–140)
GLUCOSE SERPL-MCNC: >500 MG/DL (ref 65–140)
HCT VFR BLD AUTO: 40.8 % (ref 36.5–49.3)
HGB BLD-MCNC: 13.4 G/DL (ref 12–17)
IMM GRANULOCYTES # BLD AUTO: 0.08 THOUSAND/UL (ref 0–0.2)
IMM GRANULOCYTES NFR BLD AUTO: 1 % (ref 0–2)
LYMPHOCYTES # BLD AUTO: 1.39 THOUSANDS/ΜL (ref 0.6–4.47)
LYMPHOCYTES NFR BLD AUTO: 9 % (ref 14–44)
MCH RBC QN AUTO: 25.9 PG (ref 26.8–34.3)
MCHC RBC AUTO-ENTMCNC: 32.8 G/DL (ref 31.4–37.4)
MCV RBC AUTO: 79 FL (ref 82–98)
MONOCYTES # BLD AUTO: 0.93 THOUSAND/ΜL (ref 0.17–1.22)
MONOCYTES NFR BLD AUTO: 6 % (ref 4–12)
NEUTROPHILS # BLD AUTO: 13 THOUSANDS/ΜL (ref 1.85–7.62)
NEUTS SEG NFR BLD AUTO: 84 % (ref 43–75)
NRBC BLD AUTO-RTO: 0 /100 WBCS
NT-PROBNP SERPL-MCNC: 996 PG/ML
PLATELET # BLD AUTO: 173 THOUSANDS/UL (ref 149–390)
PMV BLD AUTO: 10.4 FL (ref 8.9–12.7)
POTASSIUM SERPL-SCNC: 4.8 MMOL/L (ref 3.5–5.3)
PROCALCITONIN SERPL-MCNC: 0.08 NG/ML
PROCALCITONIN SERPL-MCNC: 0.09 NG/ML
PROT SERPL-MCNC: 7.7 G/DL (ref 6.4–8.2)
RBC # BLD AUTO: 5.17 MILLION/UL (ref 3.88–5.62)
RSV RNA RESP QL NAA+PROBE: NEGATIVE
SARS-COV-2 RNA RESP QL NAA+PROBE: NEGATIVE
SODIUM SERPL-SCNC: 134 MMOL/L (ref 136–145)
WBC # BLD AUTO: 15.48 THOUSAND/UL (ref 4.31–10.16)

## 2022-06-09 PROCEDURE — 94760 N-INVAS EAR/PLS OXIMETRY 1: CPT

## 2022-06-09 PROCEDURE — 99223 1ST HOSP IP/OBS HIGH 75: CPT | Performed by: HOSPITALIST

## 2022-06-09 PROCEDURE — 94644 CONT INHLJ TX 1ST HOUR: CPT

## 2022-06-09 PROCEDURE — 94668 MNPJ CHEST WALL SBSQ: CPT

## 2022-06-09 PROCEDURE — 0241U HB NFCT DS VIR RESP RNA 4 TRGT: CPT

## 2022-06-09 PROCEDURE — 83880 ASSAY OF NATRIURETIC PEPTIDE: CPT

## 2022-06-09 PROCEDURE — 84145 PROCALCITONIN (PCT): CPT

## 2022-06-09 PROCEDURE — 71045 X-RAY EXAM CHEST 1 VIEW: CPT

## 2022-06-09 PROCEDURE — 94664 DEMO&/EVAL PT USE INHALER: CPT

## 2022-06-09 PROCEDURE — 84145 PROCALCITONIN (PCT): CPT | Performed by: HOSPITALIST

## 2022-06-09 PROCEDURE — 84484 ASSAY OF TROPONIN QUANT: CPT

## 2022-06-09 PROCEDURE — 80053 COMPREHEN METABOLIC PANEL: CPT

## 2022-06-09 PROCEDURE — 99285 EMERGENCY DEPT VISIT HI MDM: CPT

## 2022-06-09 PROCEDURE — 36415 COLL VENOUS BLD VENIPUNCTURE: CPT

## 2022-06-09 PROCEDURE — 99285 EMERGENCY DEPT VISIT HI MDM: CPT | Performed by: EMERGENCY MEDICINE

## 2022-06-09 PROCEDURE — 96374 THER/PROPH/DIAG INJ IV PUSH: CPT

## 2022-06-09 PROCEDURE — 82948 REAGENT STRIP/BLOOD GLUCOSE: CPT

## 2022-06-09 PROCEDURE — 85025 COMPLETE CBC W/AUTO DIFF WBC: CPT

## 2022-06-09 RX ORDER — METHYLPREDNISOLONE SODIUM SUCCINATE 40 MG/ML
40 INJECTION, POWDER, LYOPHILIZED, FOR SOLUTION INTRAMUSCULAR; INTRAVENOUS EVERY 12 HOURS SCHEDULED
Status: DISCONTINUED | OUTPATIENT
Start: 2022-06-10 | End: 2022-06-10

## 2022-06-09 RX ORDER — AZITHROMYCIN 250 MG/1
500 TABLET, FILM COATED ORAL EVERY 24 HOURS
Status: COMPLETED | OUTPATIENT
Start: 2022-06-09 | End: 2022-06-11

## 2022-06-09 RX ORDER — FLUTICASONE PROPIONATE AND SALMETEROL 250; 50 UG/1; UG/1
1 POWDER RESPIRATORY (INHALATION) EVERY 12 HOURS SCHEDULED
Refills: 5 | Status: DISCONTINUED | OUTPATIENT
Start: 2022-06-09 | End: 2022-06-09

## 2022-06-09 RX ORDER — PRAVASTATIN SODIUM 80 MG/1
80 TABLET ORAL
Refills: 5 | Status: DISCONTINUED | OUTPATIENT
Start: 2022-06-10 | End: 2022-06-12 | Stop reason: HOSPADM

## 2022-06-09 RX ORDER — CARVEDILOL 6.25 MG/1
6.25 TABLET ORAL 2 TIMES DAILY WITH MEALS
Status: DISCONTINUED | OUTPATIENT
Start: 2022-06-10 | End: 2022-06-12 | Stop reason: HOSPADM

## 2022-06-09 RX ORDER — OXYCODONE HYDROCHLORIDE 5 MG/1
2.5 TABLET ORAL EVERY 4 HOURS PRN
Status: DISCONTINUED | OUTPATIENT
Start: 2022-06-09 | End: 2022-06-12 | Stop reason: HOSPADM

## 2022-06-09 RX ORDER — SODIUM CHLORIDE FOR INHALATION 0.9 %
3 VIAL, NEBULIZER (ML) INHALATION ONCE
Status: COMPLETED | OUTPATIENT
Start: 2022-06-09 | End: 2022-06-09

## 2022-06-09 RX ORDER — LISINOPRIL 10 MG/1
10 TABLET ORAL DAILY
Status: DISCONTINUED | OUTPATIENT
Start: 2022-06-10 | End: 2022-06-12 | Stop reason: HOSPADM

## 2022-06-09 RX ORDER — DOCUSATE SODIUM 100 MG/1
100 CAPSULE, LIQUID FILLED ORAL 2 TIMES DAILY
Status: DISCONTINUED | OUTPATIENT
Start: 2022-06-09 | End: 2022-06-12 | Stop reason: HOSPADM

## 2022-06-09 RX ORDER — ONDANSETRON 2 MG/ML
4 INJECTION INTRAMUSCULAR; INTRAVENOUS EVERY 4 HOURS PRN
Status: DISCONTINUED | OUTPATIENT
Start: 2022-06-09 | End: 2022-06-09

## 2022-06-09 RX ORDER — FUROSEMIDE 40 MG/1
40 TABLET ORAL DAILY
Status: DISCONTINUED | OUTPATIENT
Start: 2022-06-10 | End: 2022-06-12 | Stop reason: HOSPADM

## 2022-06-09 RX ORDER — ALBUTEROL SULFATE 90 UG/1
2 AEROSOL, METERED RESPIRATORY (INHALATION) EVERY 4 HOURS PRN
Status: DISCONTINUED | OUTPATIENT
Start: 2022-06-09 | End: 2022-06-12 | Stop reason: HOSPADM

## 2022-06-09 RX ORDER — ASPIRIN 81 MG/1
81 TABLET ORAL DAILY
Status: DISCONTINUED | OUTPATIENT
Start: 2022-06-10 | End: 2022-06-12 | Stop reason: HOSPADM

## 2022-06-09 RX ORDER — IPRATROPIUM BROMIDE AND ALBUTEROL SULFATE 2.5; .5 MG/3ML; MG/3ML
3 SOLUTION RESPIRATORY (INHALATION)
Status: DISCONTINUED | OUTPATIENT
Start: 2022-06-09 | End: 2022-06-09

## 2022-06-09 RX ORDER — GUAIFENESIN 600 MG
600 TABLET, EXTENDED RELEASE 12 HR ORAL 2 TIMES DAILY
Status: DISCONTINUED | OUTPATIENT
Start: 2022-06-09 | End: 2022-06-12 | Stop reason: HOSPADM

## 2022-06-09 RX ORDER — OXYCODONE HYDROCHLORIDE 5 MG/1
5 TABLET ORAL EVERY 4 HOURS PRN
Status: DISCONTINUED | OUTPATIENT
Start: 2022-06-09 | End: 2022-06-12 | Stop reason: HOSPADM

## 2022-06-09 RX ORDER — CLONIDINE HYDROCHLORIDE 0.1 MG/1
0.1 TABLET ORAL ONCE
Status: COMPLETED | OUTPATIENT
Start: 2022-06-09 | End: 2022-06-09

## 2022-06-09 RX ORDER — INSULIN LISPRO 100 [IU]/ML
1-6 INJECTION, SOLUTION INTRAVENOUS; SUBCUTANEOUS
Status: DISCONTINUED | OUTPATIENT
Start: 2022-06-10 | End: 2022-06-09

## 2022-06-09 RX ORDER — ONDANSETRON 2 MG/ML
4 INJECTION INTRAMUSCULAR; INTRAVENOUS EVERY 6 HOURS PRN
Status: DISCONTINUED | OUTPATIENT
Start: 2022-06-09 | End: 2022-06-12 | Stop reason: HOSPADM

## 2022-06-09 RX ORDER — SODIUM CHLORIDE 9 MG/ML
75 INJECTION, SOLUTION INTRAVENOUS CONTINUOUS
Status: DISCONTINUED | OUTPATIENT
Start: 2022-06-09 | End: 2022-06-10

## 2022-06-09 RX ORDER — POTASSIUM CHLORIDE 20 MEQ/1
20 TABLET, EXTENDED RELEASE ORAL DAILY
Status: DISCONTINUED | OUTPATIENT
Start: 2022-06-10 | End: 2022-06-12 | Stop reason: HOSPADM

## 2022-06-09 RX ORDER — ACETAMINOPHEN 325 MG/1
650 TABLET ORAL EVERY 6 HOURS PRN
Status: DISCONTINUED | OUTPATIENT
Start: 2022-06-09 | End: 2022-06-12 | Stop reason: HOSPADM

## 2022-06-09 RX ORDER — AMOXICILLIN 250 MG
1 CAPSULE ORAL 2 TIMES DAILY
Status: DISCONTINUED | OUTPATIENT
Start: 2022-06-09 | End: 2022-06-12 | Stop reason: HOSPADM

## 2022-06-09 RX ORDER — ACETAMINOPHEN 325 MG/1
650 TABLET ORAL EVERY 6 HOURS PRN
Status: DISCONTINUED | OUTPATIENT
Start: 2022-06-09 | End: 2022-06-09

## 2022-06-09 RX ORDER — CYCLOBENZAPRINE HCL 10 MG
10 TABLET ORAL 2 TIMES DAILY
Status: DISCONTINUED | OUTPATIENT
Start: 2022-06-09 | End: 2022-06-12 | Stop reason: HOSPADM

## 2022-06-09 RX ORDER — IPRATROPIUM BROMIDE AND ALBUTEROL SULFATE 2.5; .5 MG/3ML; MG/3ML
3 SOLUTION RESPIRATORY (INHALATION) 3 TIMES DAILY
Status: DISCONTINUED | OUTPATIENT
Start: 2022-06-09 | End: 2022-06-09

## 2022-06-09 RX ORDER — HEPARIN SODIUM 5000 [USP'U]/ML
5000 INJECTION, SOLUTION INTRAVENOUS; SUBCUTANEOUS EVERY 8 HOURS SCHEDULED
Status: DISCONTINUED | OUTPATIENT
Start: 2022-06-09 | End: 2022-06-09

## 2022-06-09 RX ORDER — ENOXAPARIN SODIUM 100 MG/ML
40 INJECTION SUBCUTANEOUS DAILY
Status: DISCONTINUED | OUTPATIENT
Start: 2022-06-10 | End: 2022-06-12 | Stop reason: HOSPADM

## 2022-06-09 RX ORDER — FERROUS SULFATE 325(65) MG
325 TABLET ORAL
Status: DISCONTINUED | OUTPATIENT
Start: 2022-06-10 | End: 2022-06-12 | Stop reason: HOSPADM

## 2022-06-09 RX ORDER — INSULIN LISPRO 100 [IU]/ML
1-6 INJECTION, SOLUTION INTRAVENOUS; SUBCUTANEOUS
Status: DISCONTINUED | OUTPATIENT
Start: 2022-06-09 | End: 2022-06-09

## 2022-06-09 RX ORDER — FLUTICASONE FUROATE AND VILANTEROL 100; 25 UG/1; UG/1
1 POWDER RESPIRATORY (INHALATION) DAILY
Status: DISCONTINUED | OUTPATIENT
Start: 2022-06-10 | End: 2022-06-10

## 2022-06-09 RX ORDER — MAGNESIUM HYDROXIDE/ALUMINUM HYDROXICE/SIMETHICONE 120; 1200; 1200 MG/30ML; MG/30ML; MG/30ML
30 SUSPENSION ORAL EVERY 6 HOURS PRN
Status: DISCONTINUED | OUTPATIENT
Start: 2022-06-09 | End: 2022-06-12 | Stop reason: HOSPADM

## 2022-06-09 RX ORDER — METHYLPREDNISOLONE SODIUM SUCCINATE 125 MG/2ML
125 INJECTION, POWDER, LYOPHILIZED, FOR SOLUTION INTRAMUSCULAR; INTRAVENOUS ONCE
Status: COMPLETED | OUTPATIENT
Start: 2022-06-09 | End: 2022-06-09

## 2022-06-09 RX ORDER — LEVALBUTEROL 1.25 MG/.5ML
1.25 SOLUTION, CONCENTRATE RESPIRATORY (INHALATION)
Status: DISCONTINUED | OUTPATIENT
Start: 2022-06-10 | End: 2022-06-10

## 2022-06-09 RX ADMIN — SODIUM CHLORIDE 10 UNITS/HR: 9 INJECTION, SOLUTION INTRAVENOUS at 23:58

## 2022-06-09 RX ADMIN — ISODIUM CHLORIDE 3 ML: 0.03 SOLUTION RESPIRATORY (INHALATION) at 18:20

## 2022-06-09 RX ADMIN — AZITHROMYCIN MONOHYDRATE 500 MG: 250 TABLET ORAL at 22:26

## 2022-06-09 RX ADMIN — IPRATROPIUM BROMIDE 1 MG: 0.5 SOLUTION RESPIRATORY (INHALATION) at 18:20

## 2022-06-09 RX ADMIN — SODIUM CHLORIDE 75 ML/HR: 0.9 INJECTION, SOLUTION INTRAVENOUS at 22:28

## 2022-06-09 RX ADMIN — ALBUTEROL SULFATE 10 MG: 2.5 SOLUTION RESPIRATORY (INHALATION) at 18:20

## 2022-06-09 RX ADMIN — CYCLOBENZAPRINE HYDROCHLORIDE 10 MG: 10 TABLET, FILM COATED ORAL at 22:27

## 2022-06-09 RX ADMIN — GUAIFENESIN 600 MG: 600 TABLET, EXTENDED RELEASE ORAL at 22:27

## 2022-06-09 RX ADMIN — CLONIDINE HYDROCHLORIDE 0.1 MG: 0.1 TABLET ORAL at 22:27

## 2022-06-09 RX ADMIN — METHYLPREDNISOLONE SODIUM SUCCINATE 125 MG: 125 INJECTION, POWDER, FOR SOLUTION INTRAMUSCULAR; INTRAVENOUS at 18:24

## 2022-06-09 NOTE — ED PROVIDER NOTES
History  Chief Complaint   Patient presents with    Shortness of Breath     Began with SOB last night  History of COPD  SOB increased since last night  Patient is a 30-year-old male with a significant past medical history of COPD, CHF with a recent echocardiogram showing a 60% ejection fraction and normal diastolic function, myocardial infarction with stents, diabetes, currently presenting with shortness of breath  He states that this started yesterday relatively unprovoked  It has been gradually worsening  It is described as difficulty getting deep breaths in  He feels as if it is similar to past COPD exacerbations  Of note he was recently admitted for a COPD exacerbation and discharged on 05/21/2022  He typically uses home nebulizers, however he has not been using for the last day or so secondary to a nonproductive cough which seems to be provoked by his nebulizer treatments  He associates some substernal, nonradiating, intermittent chest pain he described as a pressure  This chest pain is not associated with any specific factors including exertion  He does not have any associated diaphoresis  He has been nauseous without any vomiting  He has been having a nonproductive cough, however he states this is at his baseline  He has had no fevers, chills, sick contacts  He does endorse orthopnea, however this appears to be at his baseline  He has had no lower extremity swelling, tenderness, warmth, abdominal distention, or weight gain  He has never been intubated or admitted to the ICU with his past COPD exacerbations  Prior to Admission Medications   Prescriptions Last Dose Informant Patient Reported? Taking?    BD Insulin Syringe U-500 31G X 6MM 0 5 ML MISC 6/8/2022 at Unknown time  No Yes   Sig: USE 1 SYRINGE THREE TIMES A DAY FOR INJECTING INSULIN   aspirin (ECOTRIN LOW STRENGTH) 81 mg EC tablet 6/8/2022 at Unknown time  No Yes   Sig: Take 1 tablet (81 mg total) by mouth daily carvedilol (COREG) 6 25 mg tablet 6/9/2022 at Unknown time  No Yes   Sig: Take 1 tablet (6 25 mg total) by mouth in the morning and 1 tablet (6 25 mg total) in the evening  Take with meals  celecoxib (CeleBREX) 200 mg capsule 6/8/2022 at Unknown time  No Yes   Sig: Take 1 capsule (200 mg total) by mouth in the morning  cyclobenzaprine (FLEXERIL) 10 mg tablet 6/8/2022 at Unknown time  No Yes   Sig: Take 1 tablet (10 mg total) by mouth in the morning and 1 tablet (10 mg total) before bedtime  ferrous sulfate 325 (65 Fe) mg tablet 6/8/2022 at Unknown time  No Yes   Sig: Take 1 tablet (325 mg total) by mouth daily with breakfast   fluticasone-umeclidinium-vilanterol (Trelegy Ellipta) 100-62 5-25 MCG/INH inhaler 6/8/2022 at Unknown time  No Yes   Sig: Inhale 1 puff  in the morning  Rinse mouth after use      furosemide (LASIX) 40 mg tablet 6/8/2022 at Unknown time  No Yes   Sig: Take 1 tablet (40 mg total) by mouth in the morning  glucose blood (OneTouch Verio) test strip 6/8/2022 at Unknown time  No Yes   Sig: May substitute brand based on insurance coverage  Check glucose QID  insulin regular (HUMULIN R) 500 units/mL CONCENTRATED injection 6/8/2022 at Unknown time  No Yes   Sig: Inject 0 09 mL (45 Units total) under the skin in the morning and 0 09 mL (45 Units total) at noon and 0 09 mL (45 Units total) in the evening  Inject before meals  ipratropium-albuterol (DUO-NEB) 0 5-2 5 mg/3 mL nebulizer solution Not Taking at Unknown time  No No   Sig: Take 3 mL by nebulization 3 (three) times a day   Patient not taking: No sig reported   lisinopril (ZESTRIL) 10 mg tablet 6/8/2022 at Unknown time  No Yes   Sig: Take 1 tablet (10 mg total) by mouth in the morning  potassium chloride (K-DUR,KLOR-CON) 20 mEq tablet 6/8/2022 at Unknown time  No Yes   Sig: Take 1 tablet (20 mEq total) by mouth in the morning     simvastatin (ZOCOR) 40 mg tablet 6/8/2022 at Unknown time  No Yes   Sig: Take 1 tablet (40 mg total) by mouth in the morning  Facility-Administered Medications: None       Past Medical History:   Diagnosis Date    Abdominal pain     [facility]DA (acute [facility] injury) ([facility] ) 2018    Cardiac disease     CHF (congestive heart failure) (HCC)     COPD, severe (HCC)     Coronary artery disease     DDD (degenerative disc disease), lumbar 2020    Diabetes mellitus ([place] [address] )     Dyspnea     GERD (gastroesophageal reflux disease)     Hyperlipidemia     Hypertension     MI (myocardial infarction) ([place] [address] )     with 3 stents    Nodule of apex of right lung     JACQUELINE (obstructive sleep apnea)     Prostate cancer [facility])        Past Surgical History:   Procedure Laterality Date    ABDOMINAL SURGERY      exploratory    ANGIOPLASTY      3 stents    APPENDECTOMY      COLONOSCOPY      CT NEEDLE BIOPSY LUNG  11/3/2020    ESOPHAGOGASTRODUODENOSCOPY N/A 10/2/2017    Procedure: ESOPHAGOGASTRODUODENOSCOPY (EGD); Surgeon: Mitzi Ribeiro MD;  [facility]; Service: Gastroenterology    IR THORACENTESIS  11/3/2020    KNEE CARTILAGE SURGERY      OTHER SURGICAL HISTORY      stent placement    PROSTATE SURGERY      SKIN GRAFT      Basal cell CA back       Family History   Problem Relation Age of Onset    Heart disease Father     Other Father         Mesothelioma      I have reviewed and agree with the history as documented  E-Cigarette/Vaping    E-Cigarette Use Never User      E-Cigarette/Vaping Substances    Nicotine No     THC No     CBD No     Flavoring No     Other No     Unknown No      Social History     Tobacco Use    Smoking status: Former Smoker     Packs/day: 1 50     Years: 50 00     Pack years: 75 00     Start date: 38     Quit date: 2020     Years since quittin 9    Smokeless tobacco: Never Used   Vaping Use    Vaping Use: Never used   Substance Use Topics    Alcohol use: Never    Drug use: No        Review of Systems   Constitutional: Negative for chills and fever     HENT: Negative for sore throat  Eyes: Negative for visual disturbance  Respiratory: Positive for cough and shortness of breath  Cardiovascular: Positive for chest pain  Negative for leg swelling  Gastrointestinal: Positive for nausea  Negative for abdominal pain, constipation, diarrhea and vomiting  Genitourinary: Negative for dysuria  Musculoskeletal: Negative for back pain and neck pain  Skin: Negative for rash  Neurological: Negative for dizziness, syncope, light-headedness and headaches  Psychiatric/Behavioral: Negative for agitation  All other systems reviewed and are negative  Physical Exam  ED Triage Vitals   Temperature Pulse Respirations Blood Pressure SpO2   06/09/22 1731 06/09/22 1730 06/09/22 1730 06/09/22 1730 06/09/22 1732   98 1 °F (36 7 °C) 79 20 144/75 98 %      Temp Source Heart Rate Source Patient Position - Orthostatic VS BP Location FiO2 (%)   06/09/22 1731 -- -- -- --   Oral          Pain Score       06/09/22 2011       7             Orthostatic Vital Signs  Vitals:    06/09/22 1730 06/09/22 2018 06/09/22 2329   BP: 144/75 (!) 183/88 141/62   Pulse: 79 92 80       Physical Exam  Vitals and nursing note reviewed  Constitutional:       General: He is not in acute distress  Appearance: Normal appearance  He is not ill-appearing  Comments: Appears mildly short of breath   HENT:      Head: Normocephalic and atraumatic  Right Ear: External ear normal       Left Ear: External ear normal       Nose: Nose normal       Mouth/Throat:      Mouth: Mucous membranes are moist    Eyes:      General: No scleral icterus  Right eye: No discharge  Left eye: No discharge  Extraocular Movements: Extraocular movements intact  Conjunctiva/sclera: Conjunctivae normal       Comments: No subconjunctival hemorrhage   Neck:      Vascular: No JVD  Trachea: No tracheal deviation  Cardiovascular:      Rate and Rhythm: Normal rate and regular rhythm  Pulses: Normal pulses  Heart sounds: Normal heart sounds  No murmur heard  No friction rub  No gallop  Pulmonary:      Effort: Pulmonary effort is normal  Tachypnea present  No accessory muscle usage or respiratory distress  Breath sounds: No stridor  Examination of the right-middle field reveals decreased breath sounds  Examination of the left-middle field reveals decreased breath sounds  Examination of the right-lower field reveals decreased breath sounds  Examination of the left-lower field reveals decreased breath sounds  Decreased breath sounds present  Chest:      Chest wall: No tenderness  Abdominal:      General: Abdomen is flat  There is no distension  Palpations: Abdomen is soft  Tenderness: There is no abdominal tenderness  Genitourinary:     Comments: Deferred  Musculoskeletal:         General: Normal range of motion  Cervical back: Normal range of motion  Right lower leg: No tenderness  No edema  Left lower leg: No tenderness  No edema  Comments: Venous stasis b/l LE   Skin:     General: Skin is warm and dry  Neurological:      General: No focal deficit present  Mental Status: He is alert     Psychiatric:         Mood and Affect: Mood normal          ED Medications  Medications   acetaminophen (TYLENOL) tablet 650 mg (has no administration in time range)   aspirin (ECOTRIN LOW STRENGTH) EC tablet 81 mg (81 mg Oral Given 6/10/22 0818)   carvedilol (COREG) tablet 6 25 mg (6 25 mg Oral Given 6/10/22 0818)   cyclobenzaprine (FLEXERIL) tablet 10 mg (10 mg Oral Given 6/10/22 0818)   ferrous sulfate tablet 325 mg (325 mg Oral Given 6/10/22 0818)   furosemide (LASIX) tablet 40 mg (40 mg Oral Given 6/10/22 0818)   lisinopril (ZESTRIL) tablet 10 mg (10 mg Oral Given 6/10/22 0817)   potassium chloride (K-DUR,KLOR-CON) CR tablet 20 mEq (20 mEq Oral Given 6/10/22 0818)   pravastatin (PRAVACHOL) tablet 80 mg (has no administration in time range)   sodium chloride 0 9 % infusion (75 mL/hr Intravenous New Bag 6/10/22 0944)   docusate sodium (COLACE) capsule 100 mg (100 mg Oral Refused 6/10/22 0818)   ondansetron (ZOFRAN) injection 4 mg (has no administration in time range)   aluminum-magnesium hydroxide-simethicone (MYLANTA) oral suspension 30 mL (has no administration in time range)   guaiFENesin (MUCINEX) 12 hr tablet 600 mg (600 mg Oral Given 6/10/22 0818)   methylPREDNISolone sodium succinate (Solu-MEDROL) injection 40 mg (40 mg Intravenous Given 6/10/22 0818)   azithromycin (ZITHROMAX) tablet 500 mg (500 mg Oral Given 6/9/22 2226)   enoxaparin (LOVENOX) subcutaneous injection 40 mg (40 mg Subcutaneous Given 6/10/22 0817)   oxyCODONE (ROXICODONE) IR tablet 2 5 mg (has no administration in time range)   oxyCODONE (ROXICODONE) IR tablet 5 mg (has no administration in time range)   senna-docusate sodium (SENOKOT S) 8 6-50 mg per tablet 1 tablet (1 tablet Oral Refused 6/10/22 0818)   levalbuterol (XOPENEX) inhalation solution 1 25 mg (1 25 mg Nebulization Given 6/10/22 0731)   ipratropium (ATROVENT) 0 02 % inhalation solution 0 5 mg (0 5 mg Nebulization Given 6/10/22 0731)   albuterol (PROVENTIL HFA,VENTOLIN HFA) inhaler 2 puff (has no administration in time range)   umeclidinium bromide (INCRUSE ELLIPTA) 62 5 mcg/inh inhaler AEPB 1 puff (1 puff Inhalation Given 6/10/22 0821)     And   fluticasone-vilanterol (BREO ELLIPTA) 100-25 mcg/inh inhaler 1 puff (1 puff Inhalation Given 6/10/22 0821)   insulin regular (HumuLIN R,NovoLIN R) 1 Units/mL in sodium chloride 0 9 % 100 mL infusion (11 Units/hr Intravenous Rate/Dose Change 6/10/22 1014)   albuterol inhalation solution 10 mg (10 mg Nebulization Given 6/9/22 1820)     And   ipratropium (ATROVENT) 0 02 % inhalation solution 1 mg (1 mg Nebulization Given 6/9/22 1820)     And   sodium chloride 0 9 % inhalation solution 3 mL (3 mL Nebulization Given 6/9/22 1820)   methylPREDNISolone sodium succinate (Solu-MEDROL) injection 125 mg (125 mg Intravenous Given 6/9/22 1824)   cloNIDine (CATAPRES) tablet 0 1 mg (0 1 mg Oral Given 6/9/22 2227)       Diagnostic Studies  Results Reviewed     Procedure Component Value Units Date/Time    HS Troponin I 2hr [[id number]]  (Normal) Collected: 06/09/22 2054    Lab Status: Final result Specimen: Blood from Arm, Left Updated: 06/09/22 2142     hs TnI 2hr 12 ng/L      Delta 2hr hsTnI -2 ng/L     COVID/FLU/RSV - 2 hour TAT [[id number]]  (Normal) Collected: 06/09/22 1816    Lab Status: Final result Specimen: Nares from Nose Updated: 06/09/22 1917     SARS-CoV-2 Negative     INFLUENZA A PCR Negative     INFLUENZA B PCR Negative     RSV PCR Negative    Narrative:      FOR PEDIATRIC PATIENTS - copy/paste COVID Guidelines URL to browser: [email or web address]  [email or web address]    SARS-CoV-2 assay is a Nucleic Acid Amplification assay intended for the  qualitative detection of nucleic acid from SARS-CoV-2 in nasopharyngeal  swabs  Results are for the presumptive identification of SARS-CoV-2 RNA  Positive results are indicative of infection with SARS-CoV-2, the virus  causing COVID-19, but do not rule out bacterial infection or co-infection  with other viruses  Laboratories within the [place] and its  territories are required to report all positive results to the appropriate  public health authorities  Negative results do not preclude SARS-CoV-2  infection and should not be used as the sole basis for treatment or other  patient management decisions  Negative results must be combined with  clinical observations, patient history, and epidemiological information  This test has not been FDA cleared or approved  This test has been authorized by FDA under an Emergency Use Authorization  (EUA)   This test is only authorized for the duration of time the  declaration that circumstances exist justifying the authorization of the  emergency use of an in vitro diagnostic tests for detection of SARS-CoV-2  virus and/or diagnosis of COVID-19 infection under section 564(b)(1) of  the Act, 21 U  S C  169LIE-5(M)(2), unless the authorization is terminated  or revoked sooner  The test has been validated but independent review by FDA  and CLIA is pending  Test performed using [facility] GeneXpert: This RT-PCR assay targets N2,  a region unique to SARS-CoV-2  A conserved region in the E-gene was chosen  for pan-Sarbecovirus detection which includes SARS-CoV-2      Procalcitonin [[id number]]  (Normal) Collected: 06/09/22 1816    Lab Status: Final result Specimen: Blood from Arm, Right Updated: 06/09/22 1911     Procalcitonin 0 09 ng/ml     NT-BNP PRO [[id number]]  (Abnormal) Collected: 06/09/22 1816    Lab Status: Final result Specimen: Blood from Arm, Right Updated: 06/09/22 1906     NT-proBNP 996 pg/mL     HS Troponin 0hr (reflex protocol) [[id number]]  (Normal) Collected: 06/09/22 1816    Lab Status: Final result Specimen: Blood from Arm, Right Updated: 06/09/22 1905     hs TnI 0hr 14 ng/L     Comprehensive metabolic panel [[id number]]  (Abnormal) Collected: 06/09/22 1816    Lab Status: Final result Specimen: Blood from Arm, Right Updated: 06/09/22 1905     Sodium 134 mmol/L      Potassium 4 8 mmol/L      Chloride 98 mmol/L      CO2 29 mmol/L      ANION GAP 7 mmol/L      BUN 23 mg/dL      Creatinine 1 24 mg/dL      Glucose 275 mg/dL      Calcium 9 2 mg/dL      Corrected Calcium 9 9 mg/dL      AST 10 U/L      ALT 18 U/L      Alkaline Phosphatase 97 U/L      Total Protein 7 7 g/dL      Albumin 3 1 g/dL      Total Bilirubin 0 46 mg/dL      eGFR 56 ml/min/1 73sq m     Narrative:      [place] guidelines for Chronic Kidney Disease (CKD):     Stage 1 with normal or high GFR (GFR > 90 mL/min/1 73 square meters)    Stage 2 Mild CKD (GFR = 60-89 mL/min/1 73 square meters)    Stage 3A Moderate CKD (GFR = 45-59 mL/min/1 73 square meters)    Stage 3B Moderate CKD (GFR = 30-44 mL/min/1 73 square meters)    Stage 4 Severe CKD (GFR = 15-29 mL/min/1 73 square meters)    Stage 5 End Stage CKD (GFR <15 mL/min/1 73 square meters)  Note: GFR calculation is accurate only with a steady state creatinine    CBC and differential [[id number]]  (Abnormal) Collected: 06/09/22 1816    Lab Status: Final result Specimen: Blood from Arm, Right Updated: 06/09/22 1836     WBC 15 48 Thousand/uL      RBC 5 17 Million/uL      Hemoglobin 13 4 g/dL      Hematocrit 40 8 %      MCV 79 fL      MCH 25 9 pg      MCHC 32 8 g/dL      RDW 15 3 %      MPV 10 4 fL      Platelets 781 Thousands/uL      nRBC 0 /100 WBCs      Neutrophils Relative 84 %      Immat GRANS % 1 %      Lymphocytes Relative 9 %      Monocytes Relative 6 %      Eosinophils Relative 0 %      Basophils Relative 0 %      Neutrophils Absolute 13 00 Thousands/µL      Immature Grans Absolute 0 08 Thousand/uL      Lymphocytes Absolute 1 39 Thousands/µL      Monocytes Absolute 0 93 Thousand/µL      Eosinophils Absolute 0 05 Thousand/µL      Basophils Absolute 0 03 Thousands/µL                  XR chest 1 view portable   Final Result by Yang Tavarez MD (06/10 1985)   Stable post lobectomy changes with right apex pulmonary scarring and persistent right basilar opacity likely reflecting a combination of pleural fluid and compressive atelectasis  No acute cardiopulmonary disease              Workstation performed: [id number]               Procedures  POC Lung US    Date/Time: 6/9/2022 6:13 PM  Performed by: Mike Wray DO  Authorized by: Mike Wray DO     Patient location:  ED  Other Assisting Provider: No    Procedure details:     Exam Type:  Diagnostic    Indications: dyspnea      Assessment / Evaluation for:  Pneumothorax, hemothorax and pneumonia    Structures Visualized: pleural line, rib, left hemithorax and right hemithorax      Exam Type: initial exam      Image quality: diagnostic      Image availability:  Images available in PACS  Left Hemithorax Findings:     Left pleura visualized:  Visualized    Left Hemithorax Findings: normal      Left lung findings: normal interstitium    Right Lung Findings:     Right pleural visualized:  Visualized and limited quality    Right hemithorax findings comment:  Technically difficult given history of right lobectomy    Right lung findings: unable to assess    Interpretation:     Findings: normal thoracic ultrasound       No b lines visualized, technically difficult right lung findings given history of lobectomy however patient does appear to have lung sliding  ECG 12 Lead Documentation Only    Date/Time: 6/9/2022 5:58 PM  Performed by: Anusha Jung DO  Authorized by: Anusha Jung DO     Indications / Diagnosis:  Dysnpea, Chest pain  ECG reviewed by me, the ED Provider: yes    Patient location:  ED  Previous ECG:     Previous ECG:  Compared to current    Similarity:  No change  Interpretation:     Interpretation: abnormal    Rate:     ECG rate:  81    ECG rate assessment: normal    Rhythm:     Rhythm: sinus rhythm    QRS:     QRS axis:  Normal  Conduction:     Conduction: abnormal      Abnormal conduction: bifascicular block    Comments:      Bifasicular block          ED Course  ED Course as of 06/10/22 1043   Thu Jun 09, 2022   1740 SpO2: 98 %   1740 Temperature: 98 1 °F (36 7 °C)   1837 WBC(!): 15 48   1908 NT-proBNP(!): 996  baseline   1908 hs TnI 0hr: 14   1926 SARS-COV-2: Negative             HEART Risk Score    Flowsheet Row Most Recent Value   Heart Score Risk Calculator    History 0 Filed at: 06/09/2022 1930   ECG 1 Filed at: 06/09/2022 1930   Age 2 Filed at: 06/09/2022 1930   Risk Factors 2 Filed at: 06/09/2022 1930   Troponin 1 Filed at: 06/09/2022 1930   HEART Score 6 Filed at: 06/09/2022 1930                      SBIRT 20yo+    Flowsheet Row Most Recent Value   SBIRT (25 yo +)    In order to provide better care to our patients, we are screening all of our patients for alcohol and drug use  Would it be okay to ask you these screening questions? Unable to answer at this time Filed at: 06/09/2022 1750                MDM  Number of Diagnoses or Management Options  Chest pain: new and requires workup  COPD with acute exacerbation [facility]): new and requires workup  Dyspnea: new and requires workup  Diagnosis management comments: Patient is a 78year old male presenting with symptoms most consistent with an acute COPD exacerbation  Differential also includes CHF exacerbation, however less likely given POCUS without b lines, no evidence of volume overload on initial clinical assessment  These symptoms are similar to prior exacerbations  Low suspicion for alternate etiologies such as pneumothorax, acute PE, pneumonia  Presentation less consistent with ACS, however given patient cardiac history and chest pain will investigate further  HEART score 6  Plan: maintain oxygen 90-94% with supplemental oxygen, based on current work of breathing will not need NIPPV  Plan for trial of albuterol/ipratropium nebulizer in the form of a DE [place] ROSA neb, steroids  Will perform labs, CXR, ECG to evaluate for other acute cardiopulmonary processes  Patient symptoms improved on my reassessment after de la rosa neb  Labs largely unremarkable  Presentation most consistent with COPD exacerbation  Recommend admission for repeat nebs and further management and disposition  Patient seems to understand this plan and is agreeable  All questions answered  Patient admitted  Portions of the record may have been created with voice recognition software  Occasional wrong word or "sound a like" substitutions may have occurred due to the inherent limitations of voice recognition software   Read the chart carefully and recognize, using context, where substitutions have occurred         Amount and/or Complexity of Data Reviewed  Clinical lab tests: ordered and reviewed  Tests in the radiology section of CPT®: ordered and reviewed  Review and summarize past medical records: yes  Discuss the patient with other providers: yes  Independent visualization of images, tracings, or specimens: yes    Patient Progress  Patient progress: improved      Disposition  Final diagnoses:   Dyspnea   COPD with acute exacerbation ([facility] )   Chest pain     Time reflects when diagnosis was documented in both MDM as applicable and the Disposition within this note     Time User Action Codes Description Comment    6/9/2022  7:34 PM Clari Ramírez Add [R06 00] Dyspnea     6/9/2022  7:34 PM [place], [address] [J44 1] COPD with acute exacerbation ([facility] )     6/9/2022  7:34 PM Clari Ramírez Add [R07 9] Chest pain     6/10/2022 10:40 AM Meredith Priest Add [E11 65,  Z79 4] Type 2 diabetes mellitus with hyperglycemia, with long-term current use of insulin [facility])       ED Disposition     ED Disposition   Admit    Condition   Stable    Date/Time   Thu Jun 9, 2022  7:35 PM    Comment   Case was discussed with VIKTORIA and the patient's admission status was agreed to be Admission Status: inpatient status to the service of Dr Adan Grossman   Follow-up Information    None         Current Discharge Medication List      CONTINUE these medications which have NOT CHANGED    Details   aspirin (ECOTRIN LOW STRENGTH) 81 mg EC tablet Take 1 tablet (81 mg total) by mouth daily  Qty: 30 tablet, Refills: 2    Associated Diagnoses: Essential hypertension; Coronary artery disease involving native coronary artery of native heart without angina pectoris      BD Insulin Syringe U-500 31G X 6MM 0 5 ML MISC USE 1 SYRINGE THREE TIMES A DAY FOR INJECTING INSULIN  Qty: 100 each, Refills: 5    Associated Diagnoses: Type 2 diabetes mellitus with hyperglycemia, with long-term current use of insulin (HCC)      carvedilol (COREG) 6 25 mg tablet Take 1 tablet (6 25 mg total) by mouth in the morning and 1 tablet (6 25 mg total) in the evening  Take with meals    Qty: 34 tablet, Refills: 0 Associated Diagnoses: Essential hypertension; Coronary artery disease involving native coronary artery of native heart without angina pectoris      celecoxib (CeleBREX) 200 mg capsule Take 1 capsule (200 mg total) by mouth in the morning  Qty: 30 capsule, Refills: 5    Associated Diagnoses: Chronic bilateral low back pain without sciatica      cyclobenzaprine (FLEXERIL) 10 mg tablet Take 1 tablet (10 mg total) by mouth in the morning and 1 tablet (10 mg total) before bedtime  Qty: 60 tablet, Refills: 5    Associated Diagnoses: Chronic bilateral low back pain, unspecified whether sciatica present      ferrous sulfate 325 (65 Fe) mg tablet Take 1 tablet (325 mg total) by mouth daily with breakfast  Qty: 30 tablet, Refills: 5    Associated Diagnoses: Stage 3a chronic kidney disease ([facility])      fluticasone-umeclidinium-vilanterol (Trelegy Ellipta) 100-62 5-25 MCG/INH inhaler Inhale 1 puff  in the morning  Rinse mouth after use  Js Paci: 1 each, Refills: 5    Associated Diagnoses: COPD exacerbation ([facility])      furosemide (LASIX) 40 mg tablet Take 1 tablet (40 mg total) by mouth in the morning  Qty: 17 tablet, Refills: 0    Comments: Take dose with any 3 pound weight gain   Associated Diagnoses: Acute on chronic diastolic (congestive) heart failure ([facility])      glucose blood (OneTouch Verio) test strip May substitute brand based on insurance coverage  Check glucose QID  Qty: 100 each, Refills: 0    Associated Diagnoses: Type 2 diabetes mellitus with hyperglycemia, with long-term current use of insulin ([facility])      insulin regular (HUMULIN R) 500 units/mL CONCENTRATED injection Inject 0 09 mL (45 Units total) under the skin in the morning and 0 09 mL (45 Units total) at noon and 0 09 mL (45 Units total) in the evening  Inject before meals  Qty: 40 mL, Refills: 5    Associated Diagnoses: Chronic respiratory failure with hypoxia and hypercapnia ([facility] [address] );  Type 2 diabetes mellitus with complication, with long-term current use of insulin (HCC)      lisinopril (ZESTRIL) 10 mg tablet Take 1 tablet (10 mg total) by mouth in the morning  Qty: 17 tablet, Refills: 0    Associated Diagnoses: Essential hypertension      potassium chloride (K-DUR,KLOR-CON) 20 mEq tablet Take 1 tablet (20 mEq total) by mouth in the morning  Qty: 17 tablet, Refills: 0    Comments: Take this pill when you take your lasix  Associated Diagnoses: Chronic respiratory failure with hypoxia and hypercapnia (HCC)      simvastatin (ZOCOR) 40 mg tablet Take 1 tablet (40 mg total) by mouth in the morning  Qty: 30 tablet, Refills: 5    Associated Diagnoses: Hypercholesterolemia      ipratropium-albuterol (DUO-NEB) 0 5-2 5 mg/3 mL nebulizer solution Take 3 mL by nebulization 3 (three) times a day  Qty: 3 mL, Refills: 0    Associated Diagnoses: Chronic respiratory failure with hypoxia (HCC)           No discharge procedures on file  PDMP Review       Value Time User    PDMP Reviewed  Yes 5/5/2022 11:38 PM Stefani Anderson DO           ED Provider  Attending physically available and evaluated Ant Garcia Sr    I managed the patient along with the ED Attending      Electronically Signed by         Rody Monroy DO  06/10/22 5023

## 2022-06-10 LAB
2HR DELTA HS TROPONIN: 1 NG/L
4HR DELTA HS TROPONIN: -1 NG/L
ANION GAP SERPL CALCULATED.3IONS-SCNC: 7 MMOL/L (ref 4–13)
BASOPHILS # BLD AUTO: 0.01 THOUSANDS/ΜL (ref 0–0.1)
BASOPHILS NFR BLD AUTO: 0 % (ref 0–1)
BUN SERPL-MCNC: 28 MG/DL (ref 5–25)
CALCIUM SERPL-MCNC: 8.9 MG/DL (ref 8.3–10.1)
CARDIAC TROPONIN I PNL SERPL HS: 10 NG/L
CARDIAC TROPONIN I PNL SERPL HS: 11 NG/L
CARDIAC TROPONIN I PNL SERPL HS: 9 NG/L
CHLORIDE SERPL-SCNC: 103 MMOL/L (ref 100–108)
CO2 SERPL-SCNC: 27 MMOL/L (ref 21–32)
CREAT SERPL-MCNC: 1.26 MG/DL (ref 0.6–1.3)
EOSINOPHIL # BLD AUTO: 0 THOUSAND/ΜL (ref 0–0.61)
EOSINOPHIL NFR BLD AUTO: 0 % (ref 0–6)
ERYTHROCYTE [DISTWIDTH] IN BLOOD BY AUTOMATED COUNT: 15 % (ref 11.6–15.1)
GFR SERPL CREATININE-BSD FRML MDRD: 55 ML/MIN/1.73SQ M
GLUCOSE SERPL-MCNC: 118 MG/DL (ref 65–140)
GLUCOSE SERPL-MCNC: 155 MG/DL (ref 65–140)
GLUCOSE SERPL-MCNC: 170 MG/DL (ref 65–140)
GLUCOSE SERPL-MCNC: 195 MG/DL (ref 65–140)
GLUCOSE SERPL-MCNC: 201 MG/DL (ref 65–140)
GLUCOSE SERPL-MCNC: 221 MG/DL (ref 65–140)
GLUCOSE SERPL-MCNC: 226 MG/DL (ref 65–140)
GLUCOSE SERPL-MCNC: 243 MG/DL (ref 65–140)
GLUCOSE SERPL-MCNC: 250 MG/DL (ref 65–140)
GLUCOSE SERPL-MCNC: 276 MG/DL (ref 65–140)
GLUCOSE SERPL-MCNC: 323 MG/DL (ref 65–140)
GLUCOSE SERPL-MCNC: 344 MG/DL (ref 65–140)
GLUCOSE SERPL-MCNC: 438 MG/DL (ref 65–140)
HCT VFR BLD AUTO: 39 % (ref 36.5–49.3)
HGB BLD-MCNC: 12.6 G/DL (ref 12–17)
IMM GRANULOCYTES # BLD AUTO: 0.05 THOUSAND/UL (ref 0–0.2)
IMM GRANULOCYTES NFR BLD AUTO: 1 % (ref 0–2)
LYMPHOCYTES # BLD AUTO: 0.62 THOUSANDS/ΜL (ref 0.6–4.47)
LYMPHOCYTES NFR BLD AUTO: 6 % (ref 14–44)
MAGNESIUM SERPL-MCNC: 2.4 MG/DL (ref 1.6–2.6)
MCH RBC QN AUTO: 25.6 PG (ref 26.8–34.3)
MCHC RBC AUTO-ENTMCNC: 32.3 G/DL (ref 31.4–37.4)
MCV RBC AUTO: 79 FL (ref 82–98)
MONOCYTES # BLD AUTO: 0.27 THOUSAND/ΜL (ref 0.17–1.22)
MONOCYTES NFR BLD AUTO: 3 % (ref 4–12)
NEUTROPHILS # BLD AUTO: 9.68 THOUSANDS/ΜL (ref 1.85–7.62)
NEUTS SEG NFR BLD AUTO: 90 % (ref 43–75)
NRBC BLD AUTO-RTO: 0 /100 WBCS
PHOSPHATE SERPL-MCNC: 3.1 MG/DL (ref 2.3–4.1)
PLATELET # BLD AUTO: 152 THOUSANDS/UL (ref 149–390)
PMV BLD AUTO: 10.4 FL (ref 8.9–12.7)
POTASSIUM SERPL-SCNC: 4.5 MMOL/L (ref 3.5–5.3)
RBC # BLD AUTO: 4.93 MILLION/UL (ref 3.88–5.62)
SODIUM SERPL-SCNC: 137 MMOL/L (ref 136–145)
WBC # BLD AUTO: 10.63 THOUSAND/UL (ref 4.31–10.16)

## 2022-06-10 PROCEDURE — 94664 DEMO&/EVAL PT USE INHALER: CPT

## 2022-06-10 PROCEDURE — 99222 1ST HOSP IP/OBS MODERATE 55: CPT | Performed by: INTERNAL MEDICINE

## 2022-06-10 PROCEDURE — 94668 MNPJ CHEST WALL SBSQ: CPT

## 2022-06-10 PROCEDURE — 97166 OT EVAL MOD COMPLEX 45 MIN: CPT

## 2022-06-10 PROCEDURE — 82948 REAGENT STRIP/BLOOD GLUCOSE: CPT

## 2022-06-10 PROCEDURE — 94640 AIRWAY INHALATION TREATMENT: CPT

## 2022-06-10 PROCEDURE — 97163 PT EVAL HIGH COMPLEX 45 MIN: CPT

## 2022-06-10 PROCEDURE — 83735 ASSAY OF MAGNESIUM: CPT | Performed by: INTERNAL MEDICINE

## 2022-06-10 PROCEDURE — 85025 COMPLETE CBC W/AUTO DIFF WBC: CPT | Performed by: INTERNAL MEDICINE

## 2022-06-10 PROCEDURE — 84100 ASSAY OF PHOSPHORUS: CPT | Performed by: INTERNAL MEDICINE

## 2022-06-10 PROCEDURE — 80048 BASIC METABOLIC PNL TOTAL CA: CPT | Performed by: INTERNAL MEDICINE

## 2022-06-10 PROCEDURE — 94760 N-INVAS EAR/PLS OXIMETRY 1: CPT

## 2022-06-10 PROCEDURE — 99223 1ST HOSP IP/OBS HIGH 75: CPT | Performed by: INTERNAL MEDICINE

## 2022-06-10 PROCEDURE — 99232 SBSQ HOSP IP/OBS MODERATE 35: CPT | Performed by: INTERNAL MEDICINE

## 2022-06-10 PROCEDURE — 84484 ASSAY OF TROPONIN QUANT: CPT | Performed by: HOSPITALIST

## 2022-06-10 RX ORDER — FLUTICASONE FUROATE AND VILANTEROL 200; 25 UG/1; UG/1
1 POWDER RESPIRATORY (INHALATION) DAILY
Status: DISCONTINUED | OUTPATIENT
Start: 2022-06-11 | End: 2022-06-12 | Stop reason: HOSPADM

## 2022-06-10 RX ORDER — BUDESONIDE 0.5 MG/2ML
0.5 INHALANT ORAL
Status: DISCONTINUED | OUTPATIENT
Start: 2022-06-10 | End: 2022-06-10

## 2022-06-10 RX ORDER — FUROSEMIDE 10 MG/ML
20 INJECTION INTRAMUSCULAR; INTRAVENOUS ONCE
Status: COMPLETED | OUTPATIENT
Start: 2022-06-10 | End: 2022-06-10

## 2022-06-10 RX ADMIN — FLUTICASONE FUROATE AND VILANTEROL TRIFENATATE 1 PUFF: 100; 25 POWDER RESPIRATORY (INHALATION) at 08:21

## 2022-06-10 RX ADMIN — GUAIFENESIN 600 MG: 600 TABLET, EXTENDED RELEASE ORAL at 08:18

## 2022-06-10 RX ADMIN — FERROUS SULFATE TAB 325 MG (65 MG ELEMENTAL FE) 325 MG: 325 (65 FE) TAB at 08:18

## 2022-06-10 RX ADMIN — SODIUM CHLORIDE 8 UNITS/HR: 9 INJECTION, SOLUTION INTRAVENOUS at 09:43

## 2022-06-10 RX ADMIN — POTASSIUM CHLORIDE 20 MEQ: 1500 TABLET, EXTENDED RELEASE ORAL at 08:18

## 2022-06-10 RX ADMIN — LEVALBUTEROL HYDROCHLORIDE 1.25 MG: 1.25 SOLUTION, CONCENTRATE RESPIRATORY (INHALATION) at 07:31

## 2022-06-10 RX ADMIN — ASPIRIN 81 MG: 81 TABLET, COATED ORAL at 08:18

## 2022-06-10 RX ADMIN — ENOXAPARIN SODIUM 40 MG: 40 INJECTION SUBCUTANEOUS at 08:17

## 2022-06-10 RX ADMIN — CARVEDILOL 6.25 MG: 6.25 TABLET, FILM COATED ORAL at 17:07

## 2022-06-10 RX ADMIN — METHYLPREDNISOLONE SODIUM SUCCINATE 40 MG: 40 INJECTION, POWDER, FOR SOLUTION INTRAMUSCULAR; INTRAVENOUS at 08:18

## 2022-06-10 RX ADMIN — CYCLOBENZAPRINE HYDROCHLORIDE 10 MG: 10 TABLET, FILM COATED ORAL at 08:18

## 2022-06-10 RX ADMIN — CYCLOBENZAPRINE HYDROCHLORIDE 10 MG: 10 TABLET, FILM COATED ORAL at 20:11

## 2022-06-10 RX ADMIN — IPRATROPIUM BROMIDE 0.5 MG: 0.5 SOLUTION RESPIRATORY (INHALATION) at 07:31

## 2022-06-10 RX ADMIN — AZITHROMYCIN MONOHYDRATE 500 MG: 250 TABLET ORAL at 20:11

## 2022-06-10 RX ADMIN — UMECLIDINIUM 1 PUFF: 62.5 AEROSOL, POWDER ORAL at 08:21

## 2022-06-10 RX ADMIN — FUROSEMIDE 40 MG: 40 TABLET ORAL at 08:18

## 2022-06-10 RX ADMIN — LISINOPRIL 10 MG: 10 TABLET ORAL at 08:17

## 2022-06-10 RX ADMIN — CARVEDILOL 6.25 MG: 6.25 TABLET, FILM COATED ORAL at 08:18

## 2022-06-10 RX ADMIN — GUAIFENESIN 600 MG: 600 TABLET, EXTENDED RELEASE ORAL at 17:07

## 2022-06-10 RX ADMIN — SODIUM CHLORIDE 75 ML/HR: 0.9 INJECTION, SOLUTION INTRAVENOUS at 09:44

## 2022-06-10 RX ADMIN — PRAVASTATIN SODIUM 80 MG: 80 TABLET ORAL at 17:07

## 2022-06-10 RX ADMIN — FUROSEMIDE 20 MG: 10 INJECTION, SOLUTION INTRAMUSCULAR; INTRAVENOUS at 12:15

## 2022-06-10 NOTE — CONSULTS
PULMONOLOGY CONSULT NOTE     Name: Zi Coburn    Age & Sex: 78 y o  male   MRN: [id number]  Unit/Bed#: [facility] 823-01   Encounter: [id number]        Reason for consultation: Dyspnea    Requesting physician: Bishop Siemens, PA-C    Assessment:   Pulmonary edema  Acute on chronic decompensated HFpEF  Hx of chronic lymphocytic/exudative right pleural effusion  Chronic respiratory failure with hypoxia  Severe COPD  Hx stage 1A R lung adenocarcinoma s/p SBRT   Former long-standing tobacco use  DM2 with steroid induced hyperglycemia  CKD3      Plan:  · There is no wheezing on examination  Do not feel this is a COPD exacerbation  · Will discontinue steroids, IVF, nebulizations  · Start LABA/ICS/LAMA hospital formulary inhalers, on DC resume home regimen  · Can continue azithromycin 500 mg x3 days  Could consider addition of maintenance azithromycin but will best done in outpatient  · He has mild pulmonary edema on CXR and elevated BNP  Will give 1x dose IV lasix in addition to oral lasix  · Chronically on 2-3L supplemental O2  Saturation >92% on this  Titrate supplemental O2 to spO2 goal of >88%  · Small right pleural effusion, not amenable to thoracentesis  · Did have [place] [address] on echo from 2018, not commented on in subsequent echos though RA and RV do have evidence of damage from what ever process was going on back in 2018  Could consider PH as etiology of SOB  Outpatient work up with possible [address]  History of Present Illness   HPI:  Ramana Nichols Sr  is a 78 y o  male who has a past medical history of severe COPD (FEV1 33%), Stage 1A right lung adenocarcinoma s/p SBRT, chronic lymphoctic/exudative right pleural effusion, chronic hypoxic respiratory failure on 2-3 L home O2, HFpEF, CKD3, DM2, HLD, Venous statis dermatitis, CAD, MI s/p stent placement,  DDD and possible JACQUELINE presents to [facility] for evaluation of dyspnea   Of note the patient gives nonspecific answers and the story changes so some of the history may be unreliable  He states he has been short of breath for 4 years ever since his SBRT to his lung which he claims "he has 1 5 lungs " He reports this he began feeling short of breath at home but is unable to tell me for me for how long  He reports the tried using his rescue inhaler and nebulizer at home without relieving symptoms  He then reports that he felt chest tightness following this which concerned him and he then presented to the hospital  In the ED, he informed the team there that he was not using his nebulizer machine due to coughing induced from it  He states the chest pain varies from a tightness to a pressure sensation without radiation  He denied additional symptoms to me on my examination  Review of systems:  12 point review of systems was completed and was otherwise negative except as listed in HPI  Historical Information   Past Medical History:   Diagnosis Date    Abdominal pain     MARANDA (acute kidney injury) ([facility] [address] ) 6/19/2018    Cardiac disease     CHF (congestive heart failure) (HCC)     COPD, severe (HCC)     Coronary artery disease     DDD (degenerative disc disease), lumbar 9/1/2020    Diabetes mellitus ([facility] [address] )     Dyspnea     GERD (gastroesophageal reflux disease)     Hyperlipidemia     Hypertension     MI (myocardial infarction) ([facility] [address] )     with 3 stents    Nodule of apex of right lung     JACQUELINE (obstructive sleep apnea)     Prostate cancer [facility])      Past Surgical History:   Procedure Laterality Date    ABDOMINAL SURGERY      exploratory    ANGIOPLASTY      3 stents    APPENDECTOMY      COLONOSCOPY  2015    CT NEEDLE BIOPSY LUNG  11/3/2020    ESOPHAGOGASTRODUODENOSCOPY N/A 10/2/2017    Procedure: ESOPHAGOGASTRODUODENOSCOPY (EGD); Surgeon: Jana Ramírez MD;  Location: [facility];   Service: Gastroenterology    IR THORACENTESIS  11/3/2020    KNEE CARTILAGE SURGERY      OTHER SURGICAL HISTORY      stent placement    PROSTATE SURGERY      SKIN GRAFT      Basal cell CA back     Family History   Problem Relation Age of Onset    Heart disease Father     Other Father         Mesothelioma    %    Occupational History: former     Social History:   Social History     Socioeconomic History    Marital status:      Spouse name: Not on file    Number of children: 1    Years of education: 8    Highest education level: Not on file   Occupational History    Not on file   Tobacco Use    Smoking status: Former Smoker     Packs/day: 1 50     Years: 50 00     Pack years: 75 00     Start date: 38     Quit date: 2020     Years since quittin 9    Smokeless tobacco: Never Used   Vaping Use    Vaping Use: Never used   Substance and Sexual Activity    Alcohol use: Never    Drug use: No    Sexual activity: Never     Partners: Female   Other Topics Concern    Not on file   Social History Narrative    Most recent tobacco use screenin2018    Do you currently or have you served in the [employer]: No    Were you activated, into active duty, as a member of the [employer] or as a Reservist: No    Caffeine intake: Moderate    Alcohol intake: None    Marital status:      Number of children: 1    Education: 8    Occupation: retired    Sexual orientation: Heterosexual    General stress level: Medium    Live alone or with others: with others    Exercise level: None    Sexually active: No    Overweight: Yes    Obese: Yes    Guns present in home: No    Seat belts used routinely: Yes    Advance directive: No    Sunscreen used routinely: No    Smoke alarm in home: Yes    Legally blind in one or both eyes: No    Hard of hearing or deaf in one or both ears: No     Social Determinants of Health     Financial Resource Strain: Not on file   Food Insecurity: No Food Insecurity    Worried About Running Out of Food in the Last Year: Never true    Kenyatta of Food in the Last Year: Never true   Transportation Needs: No Transportation Needs    Lack of Transportation (Medical): No    Lack of Transportation (Non-Medical):  No   Physical Activity: Not on file   Stress: Not on file   Social Connections: Not on file   Intimate Partner Violence: Not on file   Housing Stability: Low Risk     Unable to Pay for Housing in the Last Year: No    Number of Places Lived in the Last Year: 1    Unstable Housing in the Last Year: No         Meds/Allergies   Current Facility-Administered Medications   Medication Dose Route Frequency    acetaminophen (TYLENOL) tablet 650 mg  650 mg Oral Q6H PRN    albuterol (PROVENTIL HFA,VENTOLIN HFA) inhaler 2 puff  2 puff Inhalation Q4H PRN    aluminum-magnesium hydroxide-simethicone (MYLANTA) oral suspension 30 mL  30 mL Oral Q6H PRN    aspirin (ECOTRIN LOW STRENGTH) EC tablet 81 mg  81 mg Oral Daily    azithromycin (ZITHROMAX) tablet 500 mg  500 mg Oral Q24H    carvedilol (COREG) tablet 6 25 mg  6 25 mg Oral BID With Meals    cyclobenzaprine (FLEXERIL) tablet 10 mg  10 mg Oral BID    docusate sodium (COLACE) capsule 100 mg  100 mg Oral BID    enoxaparin (LOVENOX) subcutaneous injection 40 mg  40 mg Subcutaneous Daily    ferrous sulfate tablet 325 mg  325 mg Oral Daily With Breakfast    fluticasone-vilanterol (BREO ELLIPTA) 200-25 MCG/INH inhaler 1 puff  1 puff Inhalation Daily    furosemide (LASIX) injection 20 mg  20 mg Intravenous Once    furosemide (LASIX) tablet 40 mg  40 mg Oral Daily    guaiFENesin (MUCINEX) 12 hr tablet 600 mg  600 mg Oral BID    insulin regular (HumuLIN R,NovoLIN R) 1 Units/mL in sodium chloride 0 9 % 100 mL infusion  0 3-21 Units/hr Intravenous Titrated    lisinopril (ZESTRIL) tablet 10 mg  10 mg Oral Daily    ondansetron (ZOFRAN) injection 4 mg  4 mg Intravenous Q6H PRN    oxyCODONE (ROXICODONE) IR tablet 2 5 mg  2 5 mg Oral Q4H PRN    oxyCODONE (ROXICODONE) IR tablet 5 mg  5 mg Oral Q4H PRN    potassium chloride (K-DUR,KLOR-CON) CR tablet 20 mEq  20 mEq Oral Daily    pravastatin (PRAVACHOL) tablet 80 mg  80 mg Oral Daily With Dinner    senna-docusate sodium (SENOKOT S) 8 6-50 mg per tablet 1 tablet  1 tablet Oral BID    umeclidinium bromide (INCRUSE ELLIPTA) 62 5 mcg/inh inhaler AEPB 1 puff  1 puff Inhalation Daily     Medications Prior to Admission   Medication    aspirin (ECOTRIN LOW STRENGTH) 81 mg EC tablet    BD Insulin Syringe U-500 31G X 6MM 0 5 ML MISC    carvedilol (COREG) 6 25 mg tablet    celecoxib (CeleBREX) 200 mg capsule    cyclobenzaprine (FLEXERIL) 10 mg tablet    ferrous sulfate 325 (65 Fe) mg tablet    fluticasone-umeclidinium-vilanterol (Trelegy Ellipta) 100-62 5-25 MCG/INH inhaler    furosemide (LASIX) 40 mg tablet    glucose blood (OneTouch Verio) test strip    insulin regular (HUMULIN R) 500 units/mL CONCENTRATED injection    lisinopril (ZESTRIL) 10 mg tablet    potassium chloride (K-DUR,KLOR-CON) 20 mEq tablet    simvastatin (ZOCOR) 40 mg tablet    ipratropium-albuterol (DUO-NEB) 0 5-2 5 mg/3 mL nebulizer solution     Allergies   Allergen Reactions    Crestor [Rosuvastatin] Other (See Comments)     Unknown      Metformin GI Intolerance       Vitals: Blood pressure 141/62, pulse 80, temperature 98 5 °F (36 9 °C), temperature source Oral, resp  rate 16, height 6' (1 829 m), weight 99 8 kg (220 lb), SpO2 94 %  , 2L, Body mass index is 29 84 kg/m²  Intake/Output Summary (Last 24 hours) at 6/10/2022 1121  Last data filed at 6/10/2022 0840  Gross per 24 hour   Intake 480 ml   Output 850 ml   Net -370 ml       Physical Exam  Vitals and nursing note reviewed  Constitutional:       General: He is not in acute distress  Appearance: Normal appearance  He is well-developed  He is obese  He is ill-appearing  He is not toxic-appearing  Interventions: He is not intubated  HENT:      Head: Normocephalic and atraumatic        Right Ear: External ear normal       Left Ear: External ear normal       Nose: Nose normal       Mouth/Throat:      Mouth: Mucous membranes are moist       Pharynx: Oropharynx is clear  Eyes:      General: No scleral icterus  Conjunctiva/sclera: Conjunctivae normal    Cardiovascular:      Rate and Rhythm: Normal rate and regular rhythm  Pulses: Normal pulses  Heart sounds: Normal heart sounds  No murmur heard  No friction rub  No gallop  Pulmonary:      Effort: Pulmonary effort is normal  No tachypnea, accessory muscle usage or respiratory distress  He is not intubated  Breath sounds: Normal air entry  No decreased air movement  Decreased breath sounds present  No wheezing, rhonchi or rales  Abdominal:      General: Abdomen is flat  Bowel sounds are normal       Palpations: Abdomen is soft  Musculoskeletal:         General: No swelling or tenderness  Cervical back: Neck supple  No tenderness  Skin:     General: Skin is warm and dry  Neurological:      General: No focal deficit present  Mental Status: He is alert and oriented to person, place, and time  Mental status is at baseline  Labs: I have personally reviewed pertinent lab results    Laboratory and Diagnostics  Results from last 7 days   Lab Units 06/10/22  0622 06/09/22  1816   WBC Thousand/uL 10 63* 15 48*   HEMOGLOBIN g/dL 12 6 13 4   HEMATOCRIT % 39 0 40 8   PLATELETS Thousands/uL 152 173   NEUTROS PCT % 90* 84*   MONOS PCT % 3* 6     Results from last 7 days   Lab Units 06/10/22  0557 06/09/22  1816   SODIUM mmol/L 137 134*   POTASSIUM mmol/L 4 5 4 8   CHLORIDE mmol/L 103 98*   CO2 mmol/L 27 29   ANION GAP mmol/L 7 7   BUN mg/dL 28* 23   CREATININE mg/dL 1 26 1 24   CALCIUM mg/dL 8 9 9 2   GLUCOSE RANDOM mg/dL 243* 275*   ALT U/L  --  18   AST U/L  --  10   ALK PHOS U/L  --  97   ALBUMIN g/dL  --  3 1*   TOTAL BILIRUBIN mg/dL  --  0 46     Results from last 7 days   Lab Units 06/10/22  0557   MAGNESIUM mg/dL 2 4   PHOSPHORUS mg/dL 3 1                                   Results from last 7 days   Lab Units 06/09/22 2054 06/09/22  1816 PROCALCITONIN ng/ml 0 08 0 09         Imaging and other studies: I have personally reviewed pertinent reports  and I have personally reviewed pertinent films in PACS  CXR 6/9/2022: right apex pulmonary scarring and persistent right basilar opacity likely reflecting a combination of pleural fluid and compressive atelectasis  CXR 5/21/2022:Left lung parenchyma is hyperinflated and clear  Decreased right lung volume with scarring in the right apex and unchanged opacity in the right lung base obscuring hemidiaphragm and costophrenic angle and cardiac size  CT chest 5/2/2022: LUNGS:  Chronic right lung volume loss with linear scarring in the right upper lobe and lingula is again seen  Unchanged right basilar round atelectasis  Scattered airspace opacities within the left upper lobe are seen  Unchanged pulmonary emphysema   is seen      PLEURA:  Right-sided pleural effusion is seen      HEART/GREAT VESSELS: Heavy atherosclerotic coronary artery calcification is noted  Heart is otherwise unremarkable  No thoracic aortic aneurysm      MEDIASTINUM AND JEREMÍAS:  Mediastinum is shifted to the right secondary to chronic lung volume loss     CHEST WALL AND LOWER NECK:  Unremarkable  Pulmonary function testing: 10/14/2020  FEV1/FVC Ratio: 41 %  Forced Vital Capacity: 2 72 L    61 % predicted  FEV1: 1 11 L     33 % predicted     Lung volumes by body plethysmography:   Total Lung Capacity 89 % predicted   Residual volume 144 % predicted     DLCO corrected for patients hemoglobin level: 36 %    Severe obstruction, air trapping, severe diffusion deficit    EKG, Pathology, and Other Studies: I have personally reviewed pertinent reports  and I have personally reviewed pertinent films in PACS  Echo 5/22    Left Ventricle: Left ventricular cavity size is normal  Wall thickness is increased  The left ventricular ejection fraction is 60%   Systolic function is normal  Wall motion is normal  Diastolic function is normal    Right Ventricle: Right ventricular cavity size is mildly dilated  Systolic function is normal     Right Atrium: The atrium is mildly dilated    Aortic Valve: There is mild regurgitation    Tricuspid Valve: There is mild regurgitation  Echo 7/2020  LEFT VENTRICLE:  Systolic function was normal  Ejection fraction was estimated to be 68 %  There were no regional wall motion abnormalities  Wall thickness was increased  Concentric hypertrophy was present  Doppler parameters were consistent with abnormal left ventricular relaxation (grade 1 diastolic dysfunction)      RIGHT VENTRICLE:  The ventricle was dilated  Systolic function was reduced      LEFT ATRIUM:  The atrium was dilated  Echo 6/2018  The left ventricle was normal in size and overall systolic function  The estimated ejection fraction was 65%  Regional left ventricular wall motion could not be assessed adequately, although was grossly normal      The right ventricular size and function were normal      There was mild tricuspid regurgitation  Severe pulmonary hypertension with and estimated pulmonary artery pressure of 70mm Hg      There was no pericardial effusion  Code Status: Level 1 - Full Code    VTE Pharmacologic Prophylaxis: Enoxaparin (Lovenox)  VTE Mechanical Prophylaxis: sequential compression device    Disclaimer: Portions of the record may have been created with voice recognition software  Occasional wrong word or "sound a like" substitutions may have occurred due to the inherent limitations of voice recognition software  Careful consideration should be taken to recognize, using context, where substitutions have occurred      Berta Torres DO   Pulmonary/Critical Care Fellowship PGY-IV  [facility]

## 2022-06-10 NOTE — PLAN OF CARE
Problem: PAIN - ADULT  Goal: Verbalizes/displays adequate comfort level or baseline comfort level  Description: Interventions:  - Encourage patient to monitor pain and request assistance  - Assess pain using appropriate pain scale  - Administer analgesics based on type and severity of pain and evaluate response  - Implement non-pharmacological measures as appropriate and evaluate response  - Consider cultural and social influences on pain and pain management  - Notify physician/advanced practitioner if interventions unsuccessful or patient reports new pain  Outcome: Progressing     Problem: INFECTION - ADULT  Goal: Absence or prevention of progression during hospitalization  Description: INTERVENTIONS:  - Assess and monitor for signs and symptoms of infection  - Monitor lab/diagnostic results  - Monitor all insertion sites, i e  indwelling lines, tubes, and drains  - Monitor endotracheal if appropriate and nasal secretions for changes in amount and color  - [place] appropriate cooling/warming therapies per order  - Administer medications as ordered  - Instruct and encourage patient and family to use good hand hygiene technique  - Identify and instruct in appropriate isolation precautions for identified infection/condition  Outcome: Progressing  Goal: Absence of fever/infection during neutropenic period  Description: INTERVENTIONS:  - Monitor WBC    Outcome: Progressing

## 2022-06-10 NOTE — OCCUPATIONAL THERAPY NOTE
Occupational Therapy Evaluation     Patient Name: Delbert Renee  Today's Date: 6/10/2022  Problem List  Principal Problem:    Shortness of breath  Active Problems:    HLD (hyperlipidemia)    Type 2 diabetes mellitus with hyperglycemia, with long-term current use of insulin (HCC)    Venous stasis dermatitis of both lower extremities    COPD, severe (HCC)    Chronic diastolic heart failure (HCC)    CAD (coronary artery disease)    Chronic respiratory failure with hypoxia (HCC)    Adenocarcinoma of lung (HCC)    CKD (chronic kidney disease) stage 3, GFR 30-59 ml/min (HCC)    Past Medical History  Past Medical History:   Diagnosis Date    Abdominal pain     MARANDA (acute kidney injury) ([facility] [address] ) 6/19/2018    Cardiac disease     CHF (congestive heart failure) (HCC)     COPD, severe (HCC)     Coronary artery disease     DDD (degenerative disc disease), lumbar 9/1/2020    Diabetes mellitus (HCC)     Dyspnea     GERD (gastroesophageal reflux disease)     Hyperlipidemia     Hypertension     MI (myocardial infarction) ([facility] [address] )     with 3 stents    Nodule of apex of right lung     JACQUELINE (obstructive sleep apnea)     Prostate cancer [facility])      Past Surgical History  Past Surgical History:   Procedure Laterality Date    ABDOMINAL SURGERY      exploratory    ANGIOPLASTY      3 stents    APPENDECTOMY      COLONOSCOPY  2015    CT NEEDLE BIOPSY LUNG  11/3/2020    ESOPHAGOGASTRODUODENOSCOPY N/A 10/2/2017    Procedure: ESOPHAGOGASTRODUODENOSCOPY (EGD); Surgeon: Christine Akers MD;  Location: [facility]; Service: Gastroenterology    IR THORACENTESIS  11/3/2020    KNEE CARTILAGE SURGERY      OTHER SURGICAL HISTORY      stent placement    PROSTATE SURGERY      SKIN GRAFT      Basal cell CA back            06/10/22 1101   OT Last Visit   OT Visit Date 06/10/22   Note Type   Note type Evaluation   Restrictions/Precautions   Weight Bearing Precautions Per Order No   Other Precautions Chair Alarm;Cognitive; Bed Alarm; Fall Risk;Pain;O2;Multiple lines  (2L O2)   Pain Assessment   Pain Assessment Tool 0-10   Pain Score No Pain   Home Living   Type of Home House   Home Layout Two level;Bed/bath upstairs;1/2 bath on main level   Bathroom Shower/Tub Walk-in shower   Bathroom Toilet Raised   Bathroom Equipment Grab bars in shower;Grab bars around toilet   Home Equipment Cane;Walker; Wheelchair-manual;Other (Comment)  (home O2)   Additional Comments Pt lives with son in [facility] with 0 VINI  Prior Function   Level of [place] Independent with ADLs and functional mobility   Lives With Son   Receives Help From Family   ADL Assistance Independent   IADLs Needs assistance   Vocational Retired   Comments PTA, Pt reports being I with ADLs and functional mobility with no AD  Lifestyle   Autonomy I with ADLs   Reciprocal Relationships Supportive son can assist as needed   Service to Others Retired   Intrinsic Gratification Watching TV   Psychosocial   Psychosocial (WDL) WDL   ADL   Where Assessed Edge of bed   Eating Assistance 7  Independent   Grooming Assistance 5  [address] [facility] 5  Supervision/Setup    [facility] [address] 5  [facility]  5  [id number] [facility] 5  Supervision/Setup   Additional Comments Pt refused ADLs at this time and above information is provided via clinical reasoning  Bed Mobility   Supine to Sit 5  Supervision   Additional items Increased time required   Sit to Supine 5  Supervision   Additional items Increased time required   Additional Comments Pt greeted supine in bed and left supine in bed at end of session  All needs met, call bell nearby, and bed alarm engaged  Transfers   Sit to Stand   (Refused)   Stand to Sit   (Refused)   Additional Comments Pt ademately refused OOB today despite max encouragement/education provided     Functional Mobility   Additional Comments Pt refused OOB today despite max encouragement/education provided  Balance   Static Sitting Fair +   Dynamic Sitting Fair   Activity Tolerance   Activity Tolerance Patient limited by fatigue;Patient limited by pain;Treatment limited secondary to agitation   Medical Staff Made Aware Co-eval with Jimbo Lee DPT 2* to Pt's medical complexity and decreased endurance  Care coordination with CM and Emily García (PAYAW)  Nurse Made Aware RN cleared/updated  RUE Assessment   RUE Assessment WFL   LUE Assessment   LUE Assessment WFL   Hand Function   Gross Motor Coordination Functional   Fine Motor Coordination Functional   Sensation   Light Touch No apparent deficits   Cognition   Overall Cognitive Status WFL   Arousal/Participation Uncooperative   Orientation Level Oriented X4   Memory Within functional limits   Comments Pt pleasant and agreeable upon arrival and able to sit on EOB during OT eval  Pt then ademately refused OOB and returned himself supine in bed  Pt refusing therapy services while in hospital, however, states he would like home therapy  Assessment   Limitation Decreased ADL status; Decreased endurance   Prognosis Fair   Assessment Pt is a 77 yo Male who presented to [facility] on 6/9/2022 with worsening of SOB for the past x2 weeks  Pt with diagnosis of SOB  Pt  has a past medical history of Abdominal pain, MARANDA (acute kidney injury) ([address] ) (6/19/2018), Cardiac disease, CHF (congestive heart failure) ([address] ), COPD, severe ([address] ), Coronary artery disease, DDD (degenerative disc disease), lumbar (9/1/2020), Diabetes mellitus ([address] ), Dyspnea, GERD (gastroesophageal reflux disease), Hyperlipidemia, Hypertension, MI (myocardial infarction) ([address] ), Nodule of apex of right lung, JACQUELINE (obstructive sleep apnea), and Prostate cancer ([address] )  Pt greeted bedside for OT evaluation on 6/10/2022  Pt lives with son in [facility] with 0 VINI  PTA, Pt reports being I with ADLs and functional mobility with no AD   Pt demonstrating the following occupational deficits: S with UB ADLs, min A with LB, and S with bed mobility  Pt agreeable to OT eval upon entrance, however, became irritable throughout evaluation  Pt ademateley refusing OOB and wishes to discontinue therapy services in hospital  [facility]) updated and CM updated  Pt with no further acute OT concerns and encouraged to continue participating in ADLs/functional mobility with nursing/restorative staff during hospitalization  Pt would benefit from returning home with increased social support upon DC to maximize safety and independence with ADLs and functional tasks of choice  DC skilled OT services  Goals   Patient Goals To sleep   Plan   OT Frequency Eval only   Recommendation   OT Discharge Recommendation No rehabilitation needs   OT - OK to Discharge Yes   Additional Comments  The patient's raw score on the AM-PAC Daily Activity inpatient short form is 19, standardized score is 40 22, greater than 39 4  Patients at this level are likely to benefit from discharge to home  Please refer to the recommendation of the Occupational Therapist for safe discharge planning     AM-PAC Daily Activity Inpatient   Lower Body Dressing 3   Bathing 3   Toileting 3   Upper Body Dressing 3   Grooming 3   Eating 4   Daily Activity Raw Score 19   Daily Activity Standardized Score (Calc for Raw Score >=11) 40 22   AM-PAC Applied Cognition Inpatient   Following a Speech/Presentation 3   Understanding Ordinary Conversation 4   Taking Medications 3   Remembering Where Things Are Placed or Put Away 3   Remembering List of 4-5 Errands 3   Taking Care of Complicated Tasks 2   Applied Cognition Raw Score 18   Applied Cognition Standardized Score 38 07        Ami Bojorquez MS, OTR/L

## 2022-06-10 NOTE — ASSESSMENT & PLAN NOTE
Lab Results   Component Value Date    HGBA1C 9 2 (H) 04/22/2022       No results for input(s): POCGLU in the last 72 hours      Blood Sugar Average: Last 72 hrs:   continue Accu-Cheks insulin sliding scale

## 2022-06-10 NOTE — ASSESSMENT & PLAN NOTE
Presented with worsening SOB x few weeks along with nonproductive cough  · CXR negative for acute disease, does show post lobectomy changes, R apex pulmonary scarring and persistent R basilar opacity likely reflecting combination of pleural fluid and atelectasis  · COVID/Flu/RSV negative  · Possible COPD exacerbation--see below  · Typically on 2L via NC at baseline  · Will not repeat echo as recently had one

## 2022-06-10 NOTE — CONSULTS
Consultation - Nicolasa Amato  78 y o  male MRN: [id number]    Unit/Bed#: [facility] 823-01 Encounter: [id number]      Assessment/Plan     Assessment: This is a 78y o -year-old male with diabetes with hyperglycemia and COPD exacerbation  Plan:  1  Type 2 diabetes hyperglycemia-this is exacerbated by IV steroids for COPD exacerbation  Continue IV insulin  Monitor blood sugars every 2 hours while he is on IV insulin  As the steroids are decreased, consider changing over to subcutaneous insulin  2  COPD exacerbation-management per primary team     CC: Diabetes Consult    History of Present Illness     HPI: Ant Garcia Sr  is a 78y o  year old male with type 2 diabetes for 5 years  He is on insulin at home  He denies any polyuria, polydipsia, nocturia and blurry vision  He denies retinopathy, stroke and claudication but does admit to neuropathy, nephropathy and heart attack  He denies any hypoglycemia  He is admitted to the hospital with COPD exacerbation for which he is receiving IV steroids  Inpatient consult to Endocrinology  Consult performed by: Misha Jeronimo MD  Consult ordered by: Santos Finch PA-C          Review of Systems   Constitutional: Negative for chills and fever  Respiratory: Negative for shortness of breath  Cardiovascular: Negative for chest pain  Gastrointestinal: Negative for constipation, diarrhea, nausea and vomiting  Endocrine: Negative for polydipsia and polyuria  All other systems reviewed and are negative        Historical Information   Past Medical History:   Diagnosis Date    Abdominal pain     MARANDA [facility] kidney injury) ([facility] [address] ) 6/19/2018    Cardiac disease     CHF (congestive heart failure) (HCC)     COPD, severe (HCC)     Coronary artery disease     DDD (degenerative disc disease), lumbar 9/1/2020    Diabetes mellitus ([address] )     Dyspnea     GERD (gastroesophageal reflux disease)     Hyperlipidemia     Hypertension     MI (myocardial infarction) [facility])     with 3 stents    Nodule of apex of right lung     JACQUELINE (obstructive sleep apnea)     Prostate cancer [facility])      Past Surgical History:   Procedure Laterality Date    ABDOMINAL SURGERY      exploratory    ANGIOPLASTY      3 stents    APPENDECTOMY      COLONOSCOPY      CT NEEDLE BIOPSY LUNG  11/3/2020    ESOPHAGOGASTRODUODENOSCOPY N/A 10/2/2017    Procedure: ESOPHAGOGASTRODUODENOSCOPY (EGD); Surgeon: Sherine Vizcaino MD;  [facility];   Service: Gastroenterology    IR THORACENTESIS  11/3/2020    KNEE CARTILAGE SURGERY      OTHER SURGICAL HISTORY      stent placement    PROSTATE SURGERY      SKIN GRAFT      Basal cell CA back     Social History   Social History     Substance and Sexual Activity   Alcohol Use Never     Social History     Substance and Sexual Activity   Drug Use No     Social History     Tobacco Use   Smoking Status Former Smoker    Packs/day: 1 50    Years: 50 00    Pack years: 75 00    Start date: 38    Quit date: 2020    Years since quittin 9   Smokeless Tobacco Never Used     Family History:   Family History   Problem Relation Age of Onset    Heart disease Father     Other Father         Mesothelioma        Meds/Allergies   Current Facility-Administered Medications   Medication Dose Route Frequency Provider Last Rate Last Admin    acetaminophen (TYLENOL) tablet 650 mg  650 mg Oral Q6H PRN Aly Mayfield MD        albuterol (PROVENTIL HFA,VENTOLIN HFA) inhaler 2 puff  2 puff Inhalation Q4H PRN Linh Khan MD        aluminum-magnesium hydroxide-simethicone (MYLANTA) oral suspension 30 mL  30 mL Oral Q6H PRN Rico Carlos MD        aspirin (ECOTRIN LOW STRENGTH) EC tablet 81 mg  81 mg Oral Daily Rico Carlos MD   81 mg at 06/10/22 0818    azithromycin (ZITHROMAX) tablet 500 mg  500 mg Oral Q24H Rico Carlos MD   500 mg at 22 2226    carvedilol (COREG) tablet 6 25 mg  6 25 mg Oral BID With Meals Rico Carlos MD   6 25 mg at 06/10/22 0818    cyclobenzaprine (FLEXERIL) tablet 10 mg  10 mg Oral BID Deyanira Mehta MD   10 mg at 06/10/22 0818    docusate sodium (COLACE) capsule 100 mg  100 mg Oral BID Deyanira Mehat MD        enoxaparin (LOVENOX) subcutaneous injection 40 mg  40 mg Subcutaneous Daily Deyanira Mehta MD   40 mg at 06/10/22 6555    ferrous sulfate tablet 325 mg  325 mg Oral Daily With Breakfast Deyanira Mehta MD   325 mg at 06/10/22 0818    [START ON 6/11/2022] fluticasone-vilanterol (BREO ELLIPTA) 200-25 MCG/INH inhaler 1 puff  1 puff Inhalation Daily Abdi Art DO        furosemide (LASIX) tablet 40 mg  40 mg Oral Daily Deyanira Mehta MD   40 mg at 06/10/22 0818    guaiFENesin (MUCINEX) 12 hr tablet 600 mg  600 mg Oral BID Deyanira Mehta MD   600 mg at 06/10/22 0818    insulin regular (HumuLIN R,NovoLIN R) 1 Units/mL in sodium chloride 0 9 % 100 mL infusion  0 3-21 Units/hr Intravenous Titrated Denise YOLANDE Cano 4 mL/hr at 06/10/22 1214 4 Units/hr at 06/10/22 1214    lisinopril (ZESTRIL) tablet 10 mg  10 mg Oral Daily Deyanira Mehta MD   10 mg at 06/10/22 0817    ondansetron (ZOFRAN) injection 4 mg  4 mg Intravenous Q6H PRN Deyanira Mehta MD        oxyCODONE (ROXICODONE) IR tablet 2 5 mg  2 5 mg Oral Q4H PRN Deyanira Mehta MD        oxyCODONE (ROXICODONE) IR tablet 5 mg  5 mg Oral Q4H PRN Deyanira Mehta MD        potassium chloride (K-DUR,KLOR-CON) CR tablet 20 mEq  20 mEq Oral Daily Deyanira Mehta MD   20 mEq at 06/10/22 0818    pravastatin (PRAVACHOL) tablet 80 mg  80 mg Oral Daily With Ant Barone MD        senna-docusate sodium (SENOKOT S) 8 6-50 mg per tablet 1 tablet  1 tablet Oral BID MD Ange Garcia ON 6/11/2022] umeclidinium bromide (INCRUSE ELLIPTA) 62 5 mcg/inh inhaler AEPB 1 puff  1 puff Inhalation Daily Abdi Art, DO         Allergies   Allergen Reactions    Crestor [Rosuvastatin] Other (See Comments)     Unknown      Metformin GI Intolerance       Objective   Vitals: Blood pressure 141/62, pulse 80, temperature 98 5 °F (36 9 °C), temperature source Oral, resp  rate 16, height 6' (1 829 m), weight 99 8 kg (220 lb), SpO2 94 %  Intake/Output Summary (Last 24 hours) at 6/10/2022 1404  Last data filed at 6/10/2022 1338  Gross per 24 hour   Intake 480 ml   Output 1805 ml   Net -1325 ml     Invasive Devices  Report    Peripheral Intravenous Line  Duration           Peripheral IV 06/09/22 Right;Ventral (anterior) Forearm <1 day                Physical Exam  Vitals reviewed  Constitutional:       General: He is not in acute distress  Appearance: He is well-developed  He is not diaphoretic  HENT:      Head: Normocephalic and atraumatic  Mouth/Throat:      Pharynx: No oropharyngeal exudate  Eyes:      General: Lids are normal  No scleral icterus  Right eye: No discharge  Left eye: No discharge  Conjunctiva/sclera: Conjunctivae normal    Neck:      Thyroid: No thyromegaly  Cardiovascular:      Rate and Rhythm: Normal rate and regular rhythm  Heart sounds: Normal heart sounds  No murmur heard  No friction rub  No gallop  Pulmonary:      Effort: Pulmonary effort is normal  No respiratory distress  Breath sounds: Normal breath sounds  No wheezing  Chest:   Breasts:      Right: No supraclavicular adenopathy  Left: No supraclavicular adenopathy  Abdominal:      General: Bowel sounds are normal  There is no distension  Palpations: Abdomen is soft  Tenderness: There is no abdominal tenderness  Musculoskeletal:         General: No tenderness or deformity  Normal range of motion  Cervical back: Neck supple  Lymphadenopathy:      Head:      Right side of head: No occipital adenopathy  Left side of head: No occipital adenopathy  Upper Body:      Right upper body: No supraclavicular adenopathy        Left upper body: No supraclavicular adenopathy  Skin:     General: Skin is warm  Findings: No erythema or rash  Neurological:      Mental Status: He is alert and oriented to person, place, and time  Cranial Nerves: No cranial nerve deficit  Coordination: Coordination normal    Psychiatric:         Mood and Affect: Mood normal          The history was obtained from the review of the chart, patient  Lab Results:       Lab Results   Component Value Date    WBC 10 63 (H) 06/10/2022    HGB 12 6 06/10/2022    HCT 39 0 06/10/2022    MCV 79 (L) 06/10/2022     06/10/2022     Lab Results   Component Value Date/Time    BUN 28 (H) 06/10/2022 05:57 AM    BUN 17 09/09/2015 05:30 AM     09/09/2015 05:30 AM    K 4 5 06/10/2022 05:57 AM    K 4 2 09/09/2015 05:30 AM     06/10/2022 05:57 AM     09/09/2015 05:30 AM    CO2 27 06/10/2022 05:57 AM    CO2 27 06/14/2019 08:47 PM    CREATININE 1 26 06/10/2022 05:57 AM    CREATININE 1 03 09/09/2015 05:30 AM    AST 10 06/09/2022 06:16 PM    AST 19 09/08/2015 02:00 PM    ALT 18 06/09/2022 06:16 PM    ALT 27 09/08/2015 02:00 PM    ALB 3 1 (L) 06/09/2022 06:16 PM    ALB 3 3 (L) 09/08/2015 02:00 PM     No results for input(s): CHOL, HDL, LDL, TRIG, VLDL in the last 72 hours  No results found for: Chloe Butts  POC Glucose (mg/dl)   Date Value   06/10/2022 170 (H)   06/10/2022 323 (H)   06/10/2022 221 (H)   06/10/2022 226 (H)   06/10/2022 250 (H)   06/10/2022 344 (H)   06/10/2022 438 (H)   06/09/2022 >500 (HH)   06/09/2022 >500 (HH)   06/09/2022 >500 (HH)       Imaging Studies: I have personally reviewed pertinent reports  Portions of the record may have been created with voice recognition software

## 2022-06-10 NOTE — ASSESSMENT & PLAN NOTE
Lab Results   Component Value Date    HGBA1C 9 2 (H) 04/22/2022       Recent Labs     06/10/22  0609 06/10/22  0709 06/10/22  0828 06/10/22  1010   POCGLU 250* 226* 221* 323*       Blood Sugar Average: Last 72 hrs:  (P) 460 [id number]   · A1C above goal, glucose markedly elevated 2/2 IV steroids  · Placed on insulin gtt, continue for now and consult endocrinology   · Home regimen: concentrated insulin 45 units TIDWM

## 2022-06-10 NOTE — CASE MANAGEMENT
Case Management Assessment & Discharge Planning Note    Patient name Char Ward   Location [facility] 823/[facility] 823-01 MRN [id number]  : 1945 Date 6/10/2022       Current Admission Date: 2022  Current Admission Diagnosis:Shortness of breath   Patient Active Problem List    Diagnosis Date Noted    Acute [facility] failure with [facility] ) 2022    Chronic bilateral low back pain without sciatica 2022    Shortness of breath 2022    Atypical chest pain 2022    CKD (chronic kidney disease) stage 3, GFR 30-59 ml/min ([facility] ) 2022    History of prostate cancer 2022    Adenocarcinoma of lung ([facility] ) 2022    Fracture of navicular bone of left foot 2021    Chronic respiratory failure with hypoxia ([facility] ) 04/15/2021    PAD (peripheral artery disease) ([facility] ) 2021    History of lung cancer 2020    DDD (degenerative disc disease), lumbar 2020    Anemia, iron deficiency 2020    Lung nodule 2020    Pulmonary hypertension ([facility] ) 2019    JACQUELINE (obstructive sleep apnea) 2019    COPD with acute exacerbation ([facility] ) 2019    Oxygen dependent 2018    RBBB 2018    CAD (coronary artery disease) 2018    Chronic diastolic heart failure (HCC) 2018    Pleural effusion on right 10/31/2017    Accelerated hypertension 10/31/2017    COPD, severe ([facility] ) 2017    Diabetic neuropathy ([facility] ) 2017    Essential hypertension 2016    Venous stasis dermatitis of both lower extremities 11/15/2016    Type 2 diabetes mellitus with hyperglycemia, with long-term current use of insulin ([facility] ) 2016    COPD exacerbation ([facility] ) 2016    HLD (hyperlipidemia) 2016      LOS (days): 1  Geometric Mean LOS (GMLOS) (days): 2 90  Days to GMLOS:2 3     OBJECTIVE:    Risk of Unplanned Readmission Score: 45 42         Current admission status: Inpatient       Preferred Pharmacy:   [facility] [address], [address] [place] [address]  Phone: [phone] Fax: [phone]    [address] [place] [place] [address] [place] [address] [address] [facility] [address] [address]  Phone: [phone] Fax: [phone]    Primary Care Provider: Chai Shoemaker MD    Primary Insurance: [facility]  Secondary Insurance: [employer] [place] [employer]    ASSESSMENT:  [address] Proxies    There are no active Health Care Proxies on file  Advance Directives  Does patient have a [address]?: No  Does patient have Advance Directives?: No              Patient Information  Admitted from[de-identified] Home  Mental Status: Alert  During Assessment patient was accompanied by: Not accompanied during assessment  Assessment information provided by[de-identified] Patient  Primary Caregiver: Self  Support Systems: Son, Family members  What city do you live in?: [facility] entry access options   Select all that apply : Stairs  Number of steps to enter home : 1  Type of Current Residence: [address]  Upon entering residence, is there a bedroom on the main floor (no further steps)?: Yes  Upon entering residence, is there a bathroom on the main floor (no further steps)?: Yes  In the last 12 months, was there a time when you were not able to pay the mortgage or rent on time?: No  In the last 12 months, was there a time when you did not have a steady place to sleep or slept in a shelter (including now)?: No  Homeless/housing insecurity resource given?: N/A  Living Arrangements: Lives w/ Son    Activities of Daily Living Prior to Admission  Functional Status: Independent  Completes ADLs independently?: Yes  Ambulates independently?: Yes  Does patient use assisted devices?: Yes  Assisted Devices (DME) used: Darian Ozuna  Does patient currently own DME?: Yes  What DME does the patient currently own?: Straight Laurie Pinta, Walker, Wheelchair, Portable Oxygen concentrator, Portable Oxygen tal (scooter)  Does patient have a history of Outpatient Therapy (PT/OT)?: No  Does the patient have a history of Short-Term Rehab?: No  Does patient have a history of HHC?: Yes ([facility])         Patient Information Continued  Income Source: Pension/longterm  Within the past 12 months, you worried that your food would run out before you got the money to buy more : Never true  Within the past 12 months, the food you bought just didn't last and you didn't have money to get more : Never true  Food insecurity resource given?: N/A  Does patient receive dialysis treatments?: No  Does patient have a history of substance abuse?: No  Does patient have a history of Mental Health Diagnosis?: No         Means of Transportation  Means of Transport to Appts[de-identified] Drives Self  In the past 12 months, has lack of transportation kept you from medical appointments or from getting medications?: No  In the past 12 months, has lack of transportation kept you from meetings, work, or from getting things needed for daily living?: No  Was application for public transport provided?: N/A        DISCHARGE DETAILS:    Discharge planning discussed with[de-identified] patient  Freedom of Choice: Yes                   Contacts  Patient Contacts: Concetta Sykes  Relationship to Patient[de-identified] Family  Contact Method: Phone  Phone Number: [phone]  Reason/Outcome: Continuity of Care, Discharge Planning, Emergency Contact     CM reviewed d/c planning process including the following: identifying help at home, patient preference for d/c planning needs, Discharge Lounge, [facility] Meds to Bed program, availability of treatment team to discuss questions or concerns patient and/or family may have regarding understanding medications and recognizing signs and symptoms once discharged  CM also encouraged patient to follow up with all recommended appointments after discharge   Patient advised of importance for patient and family to participate in managing patients medical well being  Patient/caregiver received discharge checklist   Content reviewed  Patient/caregiver encouraged to participate in discharge plan of care prior to discharge home

## 2022-06-10 NOTE — ASSESSMENT & PLAN NOTE
Wt Readings from Last 3 Encounters:   06/09/22 99 8 kg (220 lb)   05/17/22 98 9 kg (218 lb)   05/08/22 103 kg (226 lb)     Recent echo reviewed, appears euvolemic  · Continue home dose lasix

## 2022-06-10 NOTE — UTILIZATION REVIEW
Initial Clinical Review    Admission: Date/Time/Statement:   Admission Orders (From admission, onward)     Ordered        06/09/22 1935  Inpatient Admission  Once                      Orders Placed This Encounter   Procedures    Inpatient Admission     Standing Status:   Standing     Number of Occurrences:   1     Order Specific Question:   Level of Care     Answer:   Med Surg [16]     Order Specific Question:   Estimated length of stay     Answer:   More than 2 Midnights     Order Specific Question:   Certification     Answer:   I certify that inpatient services are medically necessary for this patient for a duration of greater than two midnights  See H&P and MD Progress Notes for additional information about the patient's course of treatment  ED Arrival Information     Expected   -    Arrival   6/9/2022 17:18    Acuity   Urgent            Means of arrival   Walk-In    Escorted by   Family Member    Service   Hospitalist    Admission type   Urgent            Arrival complaint   trouble breathing           Chief Complaint   Patient presents with    Shortness of Breath     Began with SOB last night  History of COPD  SOB increased since last night  Initial Presentation: 78 y o  male presented to the ED with acute on chronic shortness of breath and nonexertional midsternal chest pain  Patient reports that since yesterday he noted getting worse his chronic shortness of breath he also endorses nonproductive cough  PMH: lung cancer s/p R lobectomy, chronic O2 dependence of 2L, COPD, CAD, DM, HTN, CHF, GERD, hyperlipidemia, MI, prostate cancer  Plan: Inpatient admission for evaluation and treatment of shortness of breath and atypical chest pain: solu-medrol duo nebs long acting inhalers, supplemental oxygen, serial cardiac enzymes, telemetry, insulin drip  Date: 6/10   Day 2:     Internal Medicine: suspect acute COPD exacerbation-started on IV steroids  Continue nebs, inhalers  Consult Pulmonology  Maintain insulin drip  Endocrinology consult: Continue IV insulin  Monitor blood sugars every 2 hours while he is on IV insulin  As the steroids are decreased, consider changing over to subcutaneous insulin  ED Triage Vitals   Temperature Pulse Respirations Blood Pressure SpO2   06/09/22 1731 06/09/22 1730 06/09/22 1730 06/09/22 1730 06/09/22 1732   98 1 °F (36 7 °C) 79 20 144/75 98 %      Temp Source Heart Rate Source Patient Position - Orthostatic VS BP Location FiO2 (%)   06/09/22 1731 -- -- -- --   Oral          Pain Score       06/09/22 2011       7          Wt Readings from Last 1 Encounters:   06/09/22 99 8 kg (220 lb)     Additional Vital Signs:     Date/Time Temp Pulse Resp BP MAP (mmHg) SpO2 Calculated FIO2 (%) - Nasal Cannula Nasal Cannula O2 Flow Rate (L/min) O2 Device   06/09/22 23:29:16 98 5 °F (36 9 °C) 80 16 141/62 88 94 % -- -- --   06/09/22 20:18:02 98 9 °F (37 2 °C) 92 22 183/88 Abnormal  120 95 % -- -- --   06/09/22 1858 -- -- -- -- -- -- -- -- Nasal cannula   06/09/22 1820 -- -- -- -- -- 98 % -- -- --   06/09/22 1732 -- -- -- -- -- 98 % 28 2 L/min Nasal cannula   06/09/22 1731 98 1 °F (36 7 °C) -- -- -- -- -- -- -- --       Pertinent Labs/Diagnostic Test Results:   XR chest 1 view portable   Final Result by Nicho Smallwood MD (06/10 1135)   Stable post lobectomy changes with right apex pulmonary scarring and persistent right basilar opacity likely reflecting a combination of pleural fluid and compressive atelectasis  No acute cardiopulmonary disease              Workstation performed: [id number]           Results from last 7 days   Lab Units 06/09/22 1816   SARS-COV-2  Negative     Results from last 7 days   Lab Units 06/10/22  0622 06/09/22  1816   WBC Thousand/uL 10 63* 15 48*   HEMOGLOBIN g/dL 12 6 13 4   HEMATOCRIT % 39 0 40 8   PLATELETS Thousands/uL 152 173   NEUTROS ABS Thousands/µL 9 68* 13 00*         Results from last 7 days   Lab Units 06/10/22  0557 06/09/22  1816   SODIUM mmol/L 137 134*   POTASSIUM mmol/L 4 5 4 8   CHLORIDE mmol/L 103 98*   CO2 mmol/L 27 29   ANION GAP mmol/L 7 7   BUN mg/dL 28* 23   CREATININE mg/dL 1 26 1 24   EGFR ml/min/1 73sq m 55 56   CALCIUM mg/dL 8 9 9 2   MAGNESIUM mg/dL 2 4  --    PHOSPHORUS mg/dL 3 1  --      Results from last 7 days   Lab Units 06/09/22  1816   AST U/L 10   ALT U/L 18   ALK PHOS U/L 97   TOTAL PROTEIN g/dL 7 7   ALBUMIN g/dL 3 1*   TOTAL BILIRUBIN mg/dL 0 46     Results from last 7 days   Lab Units 06/10/22  1010 06/10/22  0828 06/10/22  0709 06/10/22  0609 06/10/22  0403 06/10/22  0150 06/09/22  2349 06/09/22  2347 06/09/22  2149   POC GLUCOSE mg/dl 323* 221* 226* 250* 344* 438* >500* >500* >500*     Results from last 7 days   Lab Units 06/10/22  0557 06/09/22  1816   GLUCOSE RANDOM mg/dL 243* 275*             BETA-HYDROXYBUTYRATE   Date Value Ref Range Status   06/15/2019 0 2 <0 6 mmol/L Final                      Results from last 7 days   Lab Units 06/10/22  1036 06/10/22  0822 06/10/22  0618 06/09/22 2054 06/09/22  1816   HS TNI 0HR ng/L  --   --  10  --  14   HS TNI 2HR ng/L  --  11  --  12  --    HSTNI D2 ng/L  --  1  --  -2  --    HS TNI 4HR ng/L 9  --   --   --   --    HSTNI D4 ng/L -1  --   --   --   --                  Results from last 7 days   Lab Units 06/09/22 2054 06/09/22  1816   PROCALCITONIN ng/ml 0 08 0 09                 Results from last 7 days   Lab Units 06/09/22  1816   NT-PRO BNP pg/mL 996*           Results from last 7 days   Lab Units 06/09/22  1816   INFLUENZA A PCR  Negative   INFLUENZA B PCR  Negative   RSV PCR  Negative         ED Treatment:   Medication Administration from 06/09/2022 1718 to 06/09/2022 1952       Date/Time Order Dose Route Action     06/09/2022 1820 albuterol inhalation solution 10 mg 10 mg Nebulization Given     06/09/2022 1820 ipratropium (ATROVENT) 0 02 % inhalation solution 1 mg 1 mg Nebulization Given     06/09/2022 1820 sodium chloride 0 9 % inhalation solution 3 mL 3 mL Nebulization Given     06/09/2022 1824 methylPREDNISolone sodium succinate (Solu-MEDROL) injection 125 mg 125 mg Intravenous Given        Past Medical History:   Diagnosis Date    Abdominal pain     MARANDA (acute kidney injury) ([facility] ) 6/19/2018    Cardiac disease     CHF (congestive heart failure) (HCC)     COPD, severe (HCC)     Coronary artery disease     DDD (degenerative disc disease), lumbar 9/1/2020    Diabetes mellitus ([place] [address] )     Dyspnea     GERD (gastroesophageal reflux disease)     Hyperlipidemia     Hypertension     MI (myocardial infarction) ([place] [address] )     with 3 stents    Nodule of apex of right lung     JACQUELINE (obstructive sleep apnea)     Prostate cancer ([facility])      Present on Admission:   Shortness of breath   Chronic respiratory failure with hypoxia ([facility])   Adenocarcinoma of lung ([facility])   Venous stasis dermatitis of both lower extremities   HLD (hyperlipidemia)   CAD (coronary artery disease)   COPD, severe (HCC)   CKD (chronic kidney disease) stage 3, GFR 30-59 ml/min ([facility])   Chronic diastolic heart failure ([facility])      Admitting Diagnosis: Chest pain [R07 9]  Dyspnea [R06 00]  Trouble breathing [R06 89]  COPD with acute exacerbation ([facility]) [J44 1]  Age/Sex: 78 y o  male  Admission Orders:  Scheduled Medications:  aspirin, 81 mg, Oral, Daily  azithromycin, 500 mg, Oral, Q24H  carvedilol, 6 25 mg, Oral, BID With Meals  cyclobenzaprine, 10 mg, Oral, BID  docusate sodium, 100 mg, Oral, BID  enoxaparin, 40 mg, Subcutaneous, Daily  ferrous sulfate, 325 mg, Oral, Daily With Breakfast  fluticasone-vilanterol, 1 puff, Inhalation, Daily  furosemide, 20 mg, Intravenous, Once  furosemide, 40 mg, Oral, Daily  guaiFENesin, 600 mg, Oral, BID  lisinopril, 10 mg, Oral, Daily  potassium chloride, 20 mEq, Oral, Daily  pravastatin, 80 mg, Oral, Daily With Dinner  senna-docusate sodium, 1 tablet, Oral, BID  umeclidinium bromide, 1 puff, Inhalation, Daily    methylPREDNISolone sodium succinate (Solu-MEDROL) injection 40 mg  Dose: 40 mg  Freq: Every 12 hours scheduled Route: IV  Start: 06/10/22 0900 End: 06/10/22 1120    Continuous IV Infusions:  insulin regular (HumuLIN R,NovoLIN R) infusion, 0 3-21 Units/hr, Intravenous, Titrated    sodium chloride 0 9 % infusion  Rate: 75 mL/hr Dose: 75 mL/hr  Freq: Continuous Route: IV  Last Dose: 75 mL/hr (06/10/22 0944)  Start: 06/09/22 2045 End: 06/10/22 1120    PRN Meds:  acetaminophen, 650 mg, Oral, Q6H PRN  albuterol, 2 puff, Inhalation, Q4H PRN  aluminum-magnesium hydroxide-simethicone, 30 mL, Oral, Q6H PRN  ondansetron, 4 mg, Intravenous, Q6H PRN  oxyCODONE, 2 5 mg, Oral, Q4H PRN  oxyCODONE, 5 mg, Oral, Q4H PRN        IP CONSULT TO ENDOCRINOLOGY  IP CONSULT TO PULMONOLOGY    Network Utilization Review Department  ATTENTION: Please call with any questions or concerns to [phone] and carefully listen to the prompts so that you are directed to the right person  All voicemails are confidential   [place] [facility] all requests for admission clinical reviews, approved or denied determinations and any other requests to dedicated fax number below belonging to the campus where the patient is receiving treatment   List of dedicated fax numbers for the Facilities:  [address] DENIALS (Administrative/Medical Necessity) [phone]   [address] (Maternity/NICU/Pediatrics) [phone]   [address] [place] [address] [address] [address] [address]  Tristin Hanson 37 [phone]   [place] [address] [place] [address] [phone]

## 2022-06-10 NOTE — ASSESSMENT & PLAN NOTE
Lab Results   Component Value Date    EGFR 55 06/10/2022    EGFR 56 06/09/2022    EGFR 42 05/21/2022    CREATININE 1 26 06/10/2022    CREATININE 1 24 06/09/2022    CREATININE 1 57 (H) 05/21/2022   Baseline appears around 1 2-1 4; creat stable at baseline for now  · Avoid hypotension  · Continues on home dose lasix 40 mg daily  · monitor BMP

## 2022-06-10 NOTE — ASSESSMENT & PLAN NOTE
Patient with multiple comorbidities chronic oxygen dependent on 2 L O2 who presented with getting worse shortness of breath over the past few weeks   he also endorses nonproductive cough denies excessive use of inhaler ,will treat for possible COPD exacerbation no need to repeat echo is mostly his recent echo reported preserved ejection fraction no diastolic dysfunction    Shortness of breath may be partially related to poorly controlled blood pressures he presented with significant elevated blood pressure on admission, questionable Rx compliance particularly  in the setting of hyperglycemia and elevated blood pressure  Continue Solu-Medrol duo nebs long-acting inhalers  Continue supplemental oxygen  If no clinical improvement consider pulmonary evaluation

## 2022-06-10 NOTE — ASSESSMENT & PLAN NOTE
FEV1/FVC 41%, FEV1 33%, on baseline 2-3L  · With suspected acute exacerbation, started on IV steroids  · Continue for now  · Consult pulmonology  · Continue nebs, inhalers

## 2022-06-10 NOTE — ASSESSMENT & PLAN NOTE
Follows with radiation oncology as outpatient, most recent office note reviewed from 9/2021  · Recent CTA chest 3/29/22 without acute findings in chest  · Continue outpatient f/u

## 2022-06-10 NOTE — PLAN OF CARE
Problem: PAIN - ADULT  Goal: Verbalizes/displays adequate comfort level or baseline comfort level  Description: Interventions:  - Encourage patient to monitor pain and request assistance  - Assess pain using appropriate pain scale  - Administer analgesics based on type and severity of pain and evaluate response  - Implement non-pharmacological measures as appropriate and evaluate response  - Consider cultural and social influences on pain and pain management  - Notify physician/advanced practitioner if interventions unsuccessful or patient reports new pain  Outcome: Progressing     Problem: INFECTION - ADULT  Goal: Absence or prevention of progression during hospitalization  Description: INTERVENTIONS:  - Assess and monitor for signs and symptoms of infection  - Monitor lab/diagnostic results  - Monitor all insertion sites, i e  indwelling lines, tubes, and drains  - Monitor endotracheal if appropriate and nasal secretions for changes in amount and color  - [place] appropriate cooling/warming therapies per order  - Administer medications as ordered  - Instruct and encourage patient and family to use good hand hygiene technique  - Identify and instruct in appropriate isolation precautions for identified infection/condition  Outcome: Progressing  Goal: Absence of fever/infection during neutropenic period  Description: INTERVENTIONS:  - Monitor WBC    Outcome: Progressing     Problem: DISCHARGE PLANNING  Goal: Discharge to home or other facility with appropriate resources  Description: INTERVENTIONS:  - Identify barriers to discharge w/patient and caregiver  - Arrange for needed discharge resources and transportation as appropriate  - Identify discharge learning needs (meds, wound care, etc )  - Arrange for interpretive services to assist at discharge as needed  - Refer to Case Management Department for coordinating discharge planning if the patient needs post-hospital services based on physician/advanced practitioner order or complex needs related to functional status, cognitive ability, or social support system  Outcome: Progressing     Problem: Knowledge Deficit  Goal: Patient/family/caregiver demonstrates understanding of disease process, treatment plan, medications, and discharge instructions  Description: Complete learning assessment and assess knowledge base    Interventions:  - Provide teaching at level of understanding  - Provide teaching via preferred learning methods  Outcome: Progressing

## 2022-06-10 NOTE — PHYSICAL THERAPY NOTE
PHYSICAL THERAPY EVALUATION  NAME:  Nicolasa Amato  DATE: 06/10/22    AGE:   78 y o  Mrn:   [id number]  ADMIT DX:  Chest pain [R07 9]  Dyspnea [R06 00]  Trouble breathing [R06 89]  COPD with acute exacerbation (HCC) [J44 1]    Past Medical History:   Diagnosis Date    Abdominal pain     MARANDA (acute [facility] injury) ([facility] ) 6/19/2018    Cardiac disease     CHF (congestive heart failure) (HCC)     COPD, severe (HCC)     Coronary artery disease     DDD (degenerative disc disease), lumbar 9/1/2020    Diabetes mellitus (HCC)     Dyspnea     GERD (gastroesophageal reflux disease)     Hyperlipidemia     Hypertension     MI (myocardial infarction) ([facility] )     with 3 stents    Nodule of apex of right lung     JACQUELINE (obstructive sleep apnea)     Prostate cancer [facility])      Past Surgical History:   Procedure Laterality Date    ABDOMINAL SURGERY      exploratory    ANGIOPLASTY      3 stents    APPENDECTOMY      COLONOSCOPY  2015    CT NEEDLE BIOPSY LUNG  11/3/2020    ESOPHAGOGASTRODUODENOSCOPY N/A 10/2/2017    Procedure: ESOPHAGOGASTRODUODENOSCOPY (EGD); Surgeon: Katarzyna Espinal MD;  [facility]; Service: Gastroenterology    IR THORACENTESIS  11/3/2020    KNEE CARTILAGE SURGERY      OTHER SURGICAL HISTORY      stent placement    PROSTATE SURGERY      SKIN GRAFT      Basal cell CA back       Length Of Stay: 1  PHYSICAL THERAPY EVALUATION :    06/10/22 1030   Restrictions/Precautions   Weight Bearing Precautions Per Order No   Other Precautions Impulsive;Agitated; Chair Alarm; Bed Alarm;O2;Fall Risk;Multiple lines   Home Living   Type of Home House   Home Layout Two level   Bathroom Shower/Tub Walk-in shower   Bathroom Toilet Raised   Bathroom Equipment Grab bars in shower; Shower chair   Bathroom Accessibility Accessible   Home Equipment Walker;Cane;Wheelchair-manual  (O2 CONCENTRATOR)   Prior Function   Level of [place] Independent with ADLs and functional mobility   Lives With Son   Receives Help From Family   ADL Assistance Independent   IADLs Needs assistance   Vocational Retired   Cognition   Overall Cognitive Status WFL   Orientation Level Oriented X4   Memory Within functional limits   Comments initially agreeable to therapy services/ eval- was able to sit up and move in bed w/ S then became agitated and cursing PT when asked to get up and ambualte- was educated by PT (then w/ NP- Nidhi Angel) also present pt adamently refused  further particiopation in mobility- asked that therapy services not return while in hospital- reports "I walked in her and I'll walk out when Im ready " I don't need you"  RUE Assessment   RUE Assessment WFL   LUE Assessment   LUE Assessment WFL   RLE Assessment   RLE Assessment WFL   LLE Assessment   LLE Assessment WFL   Light Touch   RLE Light Touch Impaired   LLE Light Touch Impaired   Bed Mobility   Supine to Sit 5  Supervision   Sit to Supine 5  Supervision   Transfers   Sit to Stand Unable to assess  (adament refused)   Stand to Sit Unable to assess  (adamently refused)   Balance   Static Sitting Fair +   Dynamic Sitting Fair   Activity Tolerance   Activity Tolerance Patient limited by fatigue;Patient limited by pain;Treatment limited secondary to agitation   Medical Staff Made Aware OT   Nurse Made Aware RN and team made aware of pt's choice to not participate in therapy services while in hospital- pt is aware that he will not qualify for ongoing home services if he does not participate actively in his own care  - pt does express understanding of this and continues to refuse- will d/c from caseload   Assessment   Prognosis Poor   Problem List Decreased strength;Decreased endurance; Impaired balance;Decreased mobility; Impaired judgement;Decreased safety awareness; Impaired sensation;Pain   Barriers to Discharge Inaccessible home environment;Decreased caregiver support   Barriers to Discharge Comments noncompliance w/ recommended therapy evals/ services- refused to participate in mobility OOB Goals   Patient Goals to sleep   Recommendation   PT Discharge Recommendation No rehabilitation needs  (refuses participation in PT/OT while hospitalized)   AM-PAC Basic Mobility Inpatient   Turning in Bed Without Bedrails 4   Lying on Back to Sitting on Edge of Flat Bed 4   Moving Bed to Chair 1   Standing Up From Chair 1   Walk in Room 1   Climb 3-5 Stairs 1   Basic Mobility Inpatient Raw Score 12   Basic Mobility Standardized Score 32 23   Highest Level Of Mobility   -HL Goal 4: Move to chair/commode   -HLM Achieved 3: Sit at edge of bed   Pt is 78 y o  male seen for PT evaluation s/p admit [facility] on 6/9/2022  Pt presenting w/ SOB progressive x2 weeks/ weakness  Current dx/ problem list includes: SOB COPD exacerbation; chest pain  Comorbidities affecting pt's physical performance at time of assessment listed above and significant for:  has a past medical history of Abdominal pain, MARANDA (acute kidney injury) ([facility] ) (6/19/2018), Cardiac disease, CHF (congestive heart failure) ([facility] ), COPD, severe ([facility] ), Coronary artery disease, DDD (degenerative disc disease), lumbar (9/1/2020), Diabetes mellitus ([address] ), Dyspnea, GERD (gastroesophageal reflux disease), Hyperlipidemia, Hypertension, MI (myocardial infarction) ([facility] ), Nodule of apex of right lung, JACQUELINE (obstructive sleep apnea), and Prostate cancer ([facility] )    Personal factors affecting pt at time of IE include: steps to enter environment, limited home support, advanced age, past experience, inability to perform IADLs, inability to perform ADLs, limited motivation and participation; behavioral and motivational barriers to progression; poor health management- refusing completion of PT evaluation past bed mobility   However- Due to acute medical issues, ongoing medical workup for primary dx; fall risk, decreased activity tolerance compared to baseline, increased assistance needed from caregiver at current time, continuous telemetry monitoring, multiple lines, decline in overall functional mobility status; health management issues; note unstable clinical picture (high complexity)- pt w/ high risk for readmission; falls and decline due to above-   Prior to admission, pt was living w/ son (who provides ? Support) in a [facility] w/ full flight to main bed and bath on 2nd floor  Pt is on 2Lo2 chronically and reprost 1 recent fall over tubing; reports not using AD in home and being indep w/ ADL's  Pt appears unkempt/ flat affect/ ir ratable-    Currently pt  is requiring S for roling and supine> sit on 2L- then sat EOB for ~15 mins providing social hx- upon request to stand / ambulate pt became increasingly angry and agitated; did yell at PT to leave- stated he walked into building and will walk out when ready  PT educated pt on mobility and pt continued to refuse  Pt sis state he is on nightshift schedule and normally sleeps days  PT did explain that pt would need to participate and when asked if this therapist or other could return pt adamantly refused- pt aware he will be d/c from PT at this time  Restorative updated and will attempt to mobilize as able   Pt presents functioning below baseline and currently w/ overall mobility deficits 2* to: decreased LE strength/AROM; limited flexibility;  generalized weakness/ deconditioning; decreased endurance; decreased activity tolerance; decreased safety awareness; SOB/WINTERS; fatigue; impaired safety and judgement; limited insight into current deficits; bed/ chair alarms; multiple lines; behavioral barriers; unkempt; motivational barriers; environmental barriers to d/c  Pt currently at risk for falls    (Please find additional objective findings from PT assessment regarding body systems outlined above )    Marquita Colón, PT

## 2022-06-10 NOTE — H&P
[address] [facility]  H&P- Nic Grimm   1945, 78 y o  male MRN: [id number]  Unit/Bed#: [facility] 823-01 Encounter: [id number]  Primary Care Provider: Edmundo Fuller MD   Date and time admitted to hospital: 6/9/2022  5:27 PM    * Shortness of breath  Assessment & Plan  Patient with multiple comorbidities chronic oxygen dependent on 2 L O2 who presented with getting worse shortness of breath over the past few weeks   he also endorses nonproductive cough denies excessive use of inhaler ,will treat for possible COPD exacerbation no need to repeat echo is mostly his recent echo reported preserved ejection fraction no diastolic dysfunction  Shortness of breath may be partially related to poorly controlled blood pressures he presented with significant elevated blood pressure on admission, questionable Rx compliance particularly  in the setting of hyperglycemia and elevated blood pressure  Continue Solu-Medrol duo nebs long-acting inhalers  Continue supplemental oxygen  If no clinical improvement consider pulmonary evaluation    Atypical chest pain  Assessment & Plan  Admit to telemetry  Serial cardiac enzymes  Continue home meds    Adenocarcinoma of lung [facility])  Assessment & Plan  Follow up with outpatient Oncology  Continue supplemental oxygen    Chronic respiratory failure with hypoxia [facility])  Assessment & Plan  Patient reports chronic oxygen use 2 L at home    CAD (coronary artery disease)  Assessment & Plan  Continue aspirin carvedilol statin    Venous stasis dermatitis of both lower extremities  Assessment & Plan  Keep extremities elevated    Type 2 diabetes mellitus with hyperglycemia, with long-term current use of insulin (HCC)  Assessment & Plan  Lab Results   Component Value Date    HGBA1C 9 2 (H) 04/22/2022       No results for input(s): POCGLU in the last 72 hours      Blood Sugar Average: Last 72 hrs:   continue Accu-Cheks insulin sliding scale    HLD (hyperlipidemia)  Assessment & Plan  Continue atorvastatin    VTE Pharmacologic Prophylaxis: VTE Score: 4 Moderate Risk (Score 3-4) - Pharmacological DVT Prophylaxis Ordered: enoxaparin (Lovenox)  Code Status: Level 1 - Full Code       Anticipated Length of Stay: Patient will be admitted on an inpatient basis with an anticipated length of stay of greater than 2 midnights secondary to COPD exacerbation treatment  Total Time for Visit, including Counseling / Coordination of Care: 45 minutes Greater than 50% of this total time spent on direct patient counseling and coordination of care  Chief Complaint:  Shortness of breath    History of Present Illness:  Leyla Whitt Sr  is a 78 y o  male with a PMH of lung cancer status post right lobectomy, chronic oxygen dependent on 2 L, COPD ,coronary artery disease, poorly-controlled diabetes ,, hypertension who presented from home for evaluation of acute on chronic shortness of breath and nonexertional midsternal chest pain  Patient reports that since yesterday he noted getting worse his chronic shortness of breath he also endorses nonproductive cough but denies fever chills sputum production nausea vomiting , excessive inhaler use  Workup in the emergency room reveal BNP 9 9 6, WBC 15 48, glucose 263, troponin 13  No ischemic EKG changes  Patient afebrile with O2 sat in mid 90s on his 2 L of oxygen via nasal cannula   bedside lungs ultrasound did not reveal B lines  Patient was given Solu-Medrol nebs in the ER and he admitted to the hospitalist service on inpatient basis      Review of Systems:  Review of Systems   Respiratory: Positive for chest tightness and shortness of breath          Past Medical and Surgical History:   Past Medical History:   Diagnosis Date    Abdominal pain     MARANDA (acute [facility] injury) ([facility] ) 6/19/2018    Cardiac disease     CHF (congestive heart failure) (HCC)     COPD, severe (HCC)     Coronary artery disease     DDD (degenerative disc disease), lumbar 9/1/2020  Diabetes mellitus ([facility] )     Dyspnea     GERD (gastroesophageal reflux disease)     Hyperlipidemia     Hypertension     [facility] )     with 3 stents    Nodule of apex of right lung     JACQUELINE (obstructive sleep apnea)     Prostate cancer [facility])        Past Surgical History:   Procedure Laterality Date    ABDOMINAL SURGERY      exploratory    ANGIOPLASTY      3 stents    APPENDECTOMY      COLONOSCOPY  2015    CT NEEDLE BIOPSY LUNG  11/3/2020    ESOPHAGOGASTRODUODENOSCOPY N/A 10/2/2017    Procedure: ESOPHAGOGASTRODUODENOSCOPY (EGD); Surgeon: Floyd Wing MD;  [facility]; Service: Gastroenterology    IR THORACENTESIS  11/3/2020    KNEE CARTILAGE SURGERY      OTHER SURGICAL HISTORY      stent placement    PROSTATE SURGERY      SKIN GRAFT      Basal cell CA back       Meds/Allergies:  Prior to Admission medications    Medication Sig Start Date End Date Taking? Authorizing Provider   aspirin (ECOTRIN LOW STRENGTH) 81 mg EC tablet Take 1 tablet (81 mg total) by mouth daily 5/8/22  Yes Deyvi Rosario DO   BD Insulin Syringe U-500 31G X 6MM 0 5 ML MISC USE 1 SYRINGE THREE TIMES A DAY FOR INJECTING INSULIN 10/27/21  Yes Erik Bangura MD   carvedilol (COREG) 6 25 mg tablet Take 1 tablet (6 25 mg total) by mouth in the morning and 1 tablet (6 25 mg total) in the evening  Take with meals  5/22/22  Yes Marcia Workman PA-C   celecoxib (CeleBREX) 200 mg capsule Take 1 capsule (200 mg total) by mouth in the morning  5/17/22  Yes Stewart Rodrigues MD   cyclobenzaprine (FLEXERIL) 10 mg tablet Take 1 tablet (10 mg total) by mouth in the morning and 1 tablet (10 mg total) before bedtime  5/17/22  Yes Stewart Rodrigues MD   ferrous sulfate 325 (65 Fe) mg tablet Take 1 tablet (325 mg total) by mouth daily with breakfast 5/17/22  Yes Stewart Rodrigues MD   fluticasone-umeclidinium-vilanterol (Trelegy Ellipta) 890-36 4-01 MCG/INH inhaler Inhale 1 puff  in the morning  Rinse mouth after use    5/17/22 6/16/22 Yes Lashaun Urrutia MD   furosemide (LASIX) 40 mg tablet Take 1 tablet (40 mg total) by mouth in the morning  22  Yes Reji Bronson PA-C   glucose blood (OneTouch Verio) test strip May substitute brand based on insurance coverage  Check glucose QID  22  Yes YOLANDE Guardado   insulin regular (HUMULIN R) 500 units/mL CONCENTRATED injection Inject 0 09 mL (45 Units total) under the skin in the morning and 0 09 mL (45 Units total) at noon and 0 09 mL (45 Units total) in the evening  Inject before meals  22  Yes Lashaun Urrutia MD   lisinopril (ZESTRIL) 10 mg tablet Take 1 tablet (10 mg total) by mouth in the morning  22  Yes Reji Bronson PA-C   potassium chloride (K-DUR,KLOR-CON) 20 mEq tablet Take 1 tablet (20 mEq total) by mouth in the morning  22  Yes Marcia Workman PA-C   simvastatin (ZOCOR) 40 mg tablet Take 1 tablet (40 mg total) by mouth in the morning  22  Yes Lashaun Urrutia MD   ipratropium-albuterol (DUO-NEB) 0 5-2 5 mg/3 mL nebulizer solution Take 3 mL by nebulization 3 (three) times a day  Patient not taking: No sig reported 5/3/22   Tamar Herron MD     I have reviewed home medications with a medical source (PCP, Pharmacy, other)  Allergies: Allergies   Allergen Reactions    Crestor [Rosuvastatin] Other (See Comments)     Unknown      Metformin GI Intolerance       Social History:  Marital Status:     Occupation: none  Patient Pre-hospital Living Situation: Home  Patient Pre-hospital Level of Mobility: walks  Patient Pre-hospital Diet Restrictions: reg  Substance Use History:   Social History     Substance and Sexual Activity   Alcohol Use Never     Social History     Tobacco Use   Smoking Status Former Smoker    Packs/day: 1 50    Years: 50 00    Pack years: 75 00    Start date: 38    Quit date: 2020    Years since quittin 9   Smokeless Tobacco Never Used     Social History     Substance and Sexual Activity   Drug Use No       Family History:  Family History   Problem Relation Age of Onset    Heart disease Father     Other Father         Mesothelioma        Physical Exam:     Vitals:   Blood Pressure: (!) 183/88 (06/09/22 2018)  Pulse: 92 (06/09/22 2018)  Temperature: 98 9 °F (37 2 °C) (06/09/22 2018)  Temp Source: Oral (06/09/22 2018)  Respirations: 22 (06/09/22 2018)  Height: 6' (182 9 cm) (06/09/22 2018)  Weight - Scale: 99 8 kg (220 lb) (06/09/22 2018)  SpO2: 95 % (06/09/22 2018)    Physical Exam  Constitutional:       Appearance: He is not diaphoretic  HENT:      Head: Normocephalic  Mouth/Throat:      Mouth: Mucous membranes are moist    Eyes:      Pupils: Pupils are equal, round, and reactive to light  Cardiovascular:      Rate and Rhythm: Normal rate  Heart sounds: No murmur heard  Pulmonary:      Effort: No respiratory distress  Abdominal:      Tenderness: There is no abdominal tenderness  Musculoskeletal:         General: Swelling present  Skin:     Comments: Bilateral venous statis dermatitis   Neurological:      General: No focal deficit present     Psychiatric:         Mood and Affect: Mood normal           Additional Data:     Lab Results:  Results from last 7 days   Lab Units 06/09/22  1816   WBC Thousand/uL 15 48*   HEMOGLOBIN g/dL 13 4   HEMATOCRIT % 40 8   PLATELETS Thousands/uL 173   NEUTROS PCT % 84*   LYMPHS PCT % 9*   MONOS PCT % 6   EOS PCT % 0     Results from last 7 days   Lab Units 06/09/22  1816   SODIUM mmol/L 134*   POTASSIUM mmol/L 4 8   CHLORIDE mmol/L 98*   CO2 mmol/L 29   BUN mg/dL 23   CREATININE mg/dL 1 24   ANION GAP mmol/L 7   CALCIUM mg/dL 9 2   ALBUMIN g/dL 3 1*   TOTAL BILIRUBIN mg/dL 0 46   ALK PHOS U/L 97   ALT U/L 18   AST U/L 10   GLUCOSE RANDOM mg/dL 275*                 Results from last 7 days   Lab Units 06/09/22  1816   PROCALCITONIN ng/ml 0 09       Imaging: Reviewed radiology reports from this admission including: chest xray  XR chest 1 view portable    (Results Pending) ** Please Note: This note has been constructed using a voice recognition system   **

## 2022-06-10 NOTE — RESPIRATORY THERAPY NOTE
RT Protocol Note  Hina Ruano Sr  78 y o  male MRN: [id number]  Unit/Bed#: [facility] 823-01 Encounter: [id number]    Assessment    Principal Problem:    Shortness of breath  Active Problems:    HLD (hyperlipidemia)    Type 2 diabetes mellitus with hyperglycemia, with long-term current use of insulin (HCC)    Venous stasis dermatitis of both lower extremities    Accelerated hypertension    CAD (coronary artery disease)    Chronic respiratory failure with hypoxia (HCC)    Adenocarcinoma of lung (HCC)    Atypical chest pain      Home Pulmonary Medications:  Duo-nebs TID/ Trelegy Elpita daily/ Symbicort BID    Home Devices/Therapy: Home O2    Past Medical History:   Diagnosis Date    Abdominal pain     MARANDA (acute kidney injury) ([facility] ) 2018    Cardiac disease     CHF (congestive heart failure) (HCC)     COPD, severe (HCC)     Coronary artery disease     DDD (degenerative disc disease), lumbar 2020    Diabetes mellitus (HCC)     Dyspnea     GERD (gastroesophageal reflux disease)     Hyperlipidemia     Hypertension     MI (myocardial infarction) ([place] [id number] )     with 3 stents    Nodule of apex of right lung     JACQUELINE (obstructive sleep apnea)     Prostate cancer ([place] [id number] )      Social History     Socioeconomic History    Marital status:       Spouse name: None    Number of children: 1    Years of education: 8    Highest education level: None   Occupational History    None   Tobacco Use    Smoking status: Former Smoker     Packs/day: 1 50     Years: 50 00     Pack years: 75 00     Start date: 36     Quit date: 2020     Years since quittin 9    Smokeless tobacco: Never Used   Vaping Use    Vaping Use: Never used   Substance and Sexual Activity    Alcohol use: Never    Drug use: No    Sexual activity: Never     Partners: Female   Other Topics Concern    None   Social History Narrative    Most recent tobacco use screenin2018    Do you currently or have you served in [employer]: No    Were you activated, into active duty, as a member of the [email or web address] or as a Reservist: No    Caffeine intake: Moderate    Alcohol intake: None    Marital status:     Number of children: 1    Education: 8    Occupation: retired    Sexual orientation: Heterosexual    General stress level: Medium    Live alone or with others: with others    Exercise level: None    Sexually active: No    Overweight: Yes    Obese: Yes    Guns present in home: No    Seat belts used routinely: Yes    Advance directive: No    Sunscreen used routinely: No    Smoke alarm in home: Yes    Legally blind in one or both eyes: No    Hard of hearing or deaf in one or both ears: No     Social Determinants of Health     Financial Resource Strain: Not on file   Food Insecurity: No Food Insecurity    Worried About Running Out of Food in the Last Year: Never true    [address] in the Last Year: Never true   Transportation Needs: No Transportation Needs    Lack of Transportation (Medical): No    Lack of Transportation (Non-Medical): No   Physical Activity: Not on file   Stress: Not on file   Social Connections: Not on file   Intimate Partner Violence: Not on file   Housing Stability: Low Risk     Unable to Pay for Housing in the Last Year: No    Number of Places Lived in the Last Year: 1    Unstable Housing in the Last Year: No       Subjective         Objective    Physical Exam:   Assessment Type: Assess only  General Appearance: Awake  Respiratory Pattern: Dyspnea with exertion  Chest Assessment: Chest expansion symmetrical  Bilateral Breath Sounds: Diminished (crackles at bases)  Cough: Moist, Non-productive  O2 Device: NC    Vitals:  Blood pressure (!) 183/88, pulse 92, temperature 98 9 °F (37 2 °C), temperature source Oral, resp  rate 22, height 6' (1 829 m), weight 99 8 kg (220 lb), SpO2 95 %  Imaging and other studies: I have personally reviewed pertinent reports        O2 Device: NC     Plan    Respiratory Plan: Mild Distress pathway  Airway Clearance Plan: Flutter     Resp Comments: PT ordered on txs so protocol and ACP protocol done at this time  PT is here with SOB/COPD  PT has hx of COPD and JACQUELINE  Pt wears 2L NC at home PRN  PT takes Duo-nebs TId and Symbicort/Trelegy Elipta at home  Pt currently states his breathing feels betters since coming up from ER  PT has dimished b/s with crackles at bases  PT also started on Flutter valve to help mobilze secretions and promote a cough  PT will be made TID per Protocol Xopenex/Atrovent  with PRN MDI ALbuterol

## 2022-06-10 NOTE — ASSESSMENT & PLAN NOTE
history of CAD with prior stenting (D2 and RCA x2 prior to 2015)  Has had recurrent chest pain for weeks--months   Was recently seen by cardiology during admission early May 2022 and had negative nuclear stress test  Recommended for outpatient f/u  · Continue aspirin carvedilol statin

## 2022-06-11 LAB
ANION GAP SERPL CALCULATED.3IONS-SCNC: 6 MMOL/L (ref 4–13)
BASOPHILS # BLD AUTO: 0.01 THOUSANDS/ΜL (ref 0–0.1)
BASOPHILS NFR BLD AUTO: 0 % (ref 0–1)
BUN SERPL-MCNC: 28 MG/DL (ref 5–25)
CALCIUM SERPL-MCNC: 9 MG/DL (ref 8.3–10.1)
CHLORIDE SERPL-SCNC: 101 MMOL/L (ref 100–108)
CO2 SERPL-SCNC: 31 MMOL/L (ref 21–32)
CREAT SERPL-MCNC: 1.11 MG/DL (ref 0.6–1.3)
EOSINOPHIL # BLD AUTO: 0.02 THOUSAND/ΜL (ref 0–0.61)
EOSINOPHIL NFR BLD AUTO: 0 % (ref 0–6)
ERYTHROCYTE [DISTWIDTH] IN BLOOD BY AUTOMATED COUNT: 15.6 % (ref 11.6–15.1)
GFR SERPL CREATININE-BSD FRML MDRD: 65 ML/MIN/1.73SQ M
GLUCOSE SERPL-MCNC: 100 MG/DL (ref 65–140)
GLUCOSE SERPL-MCNC: 103 MG/DL (ref 65–140)
GLUCOSE SERPL-MCNC: 103 MG/DL (ref 65–140)
GLUCOSE SERPL-MCNC: 106 MG/DL (ref 65–140)
GLUCOSE SERPL-MCNC: 112 MG/DL (ref 65–140)
GLUCOSE SERPL-MCNC: 119 MG/DL (ref 65–140)
GLUCOSE SERPL-MCNC: 133 MG/DL (ref 65–140)
GLUCOSE SERPL-MCNC: 146 MG/DL (ref 65–140)
GLUCOSE SERPL-MCNC: 170 MG/DL (ref 65–140)
GLUCOSE SERPL-MCNC: 180 MG/DL (ref 65–140)
GLUCOSE SERPL-MCNC: 231 MG/DL (ref 65–140)
GLUCOSE SERPL-MCNC: 342 MG/DL (ref 65–140)
HCT VFR BLD AUTO: 40.1 % (ref 36.5–49.3)
HGB BLD-MCNC: 12.4 G/DL (ref 12–17)
IMM GRANULOCYTES # BLD AUTO: 0.06 THOUSAND/UL (ref 0–0.2)
IMM GRANULOCYTES NFR BLD AUTO: 0 % (ref 0–2)
LYMPHOCYTES # BLD AUTO: 1.17 THOUSANDS/ΜL (ref 0.6–4.47)
LYMPHOCYTES NFR BLD AUTO: 7 % (ref 14–44)
MCH RBC QN AUTO: 25.2 PG (ref 26.8–34.3)
MCHC RBC AUTO-ENTMCNC: 30.9 G/DL (ref 31.4–37.4)
MCV RBC AUTO: 82 FL (ref 82–98)
MONOCYTES # BLD AUTO: 1.41 THOUSAND/ΜL (ref 0.17–1.22)
MONOCYTES NFR BLD AUTO: 9 % (ref 4–12)
NEUTROPHILS # BLD AUTO: 13.52 THOUSANDS/ΜL (ref 1.85–7.62)
NEUTS SEG NFR BLD AUTO: 84 % (ref 43–75)
NRBC BLD AUTO-RTO: 0 /100 WBCS
PLATELET # BLD AUTO: 171 THOUSANDS/UL (ref 149–390)
PMV BLD AUTO: 11.5 FL (ref 8.9–12.7)
POTASSIUM SERPL-SCNC: 4 MMOL/L (ref 3.5–5.3)
RBC # BLD AUTO: 4.92 MILLION/UL (ref 3.88–5.62)
SODIUM SERPL-SCNC: 138 MMOL/L (ref 136–145)
WBC # BLD AUTO: 16.19 THOUSAND/UL (ref 4.31–10.16)

## 2022-06-11 PROCEDURE — 99232 SBSQ HOSP IP/OBS MODERATE 35: CPT | Performed by: INTERNAL MEDICINE

## 2022-06-11 PROCEDURE — 87205 SMEAR GRAM STAIN: CPT | Performed by: HOSPITALIST

## 2022-06-11 PROCEDURE — 87070 CULTURE OTHR SPECIMN AEROBIC: CPT | Performed by: HOSPITALIST

## 2022-06-11 PROCEDURE — 85025 COMPLETE CBC W/AUTO DIFF WBC: CPT | Performed by: INTERNAL MEDICINE

## 2022-06-11 PROCEDURE — 80048 BASIC METABOLIC PNL TOTAL CA: CPT | Performed by: INTERNAL MEDICINE

## 2022-06-11 PROCEDURE — 94668 MNPJ CHEST WALL SBSQ: CPT

## 2022-06-11 PROCEDURE — 82948 REAGENT STRIP/BLOOD GLUCOSE: CPT

## 2022-06-11 RX ORDER — INSULIN LISPRO 100 [IU]/ML
4-20 INJECTION, SOLUTION INTRAVENOUS; SUBCUTANEOUS
Status: DISCONTINUED | OUTPATIENT
Start: 2022-06-11 | End: 2022-06-12 | Stop reason: HOSPADM

## 2022-06-11 RX ORDER — INSULIN LISPRO 100 [IU]/ML
4-20 INJECTION, SOLUTION INTRAVENOUS; SUBCUTANEOUS
Status: DISCONTINUED | OUTPATIENT
Start: 2022-06-11 | End: 2022-06-11

## 2022-06-11 RX ORDER — INSULIN LISPRO 100 [IU]/ML
2-12 INJECTION, SOLUTION INTRAVENOUS; SUBCUTANEOUS
Status: DISCONTINUED | OUTPATIENT
Start: 2022-06-11 | End: 2022-06-12 | Stop reason: HOSPADM

## 2022-06-11 RX ADMIN — OXYCODONE HYDROCHLORIDE 5 MG: 5 TABLET ORAL at 13:34

## 2022-06-11 RX ADMIN — GUAIFENESIN 600 MG: 600 TABLET, EXTENDED RELEASE ORAL at 08:47

## 2022-06-11 RX ADMIN — OXYCODONE HYDROCHLORIDE 5 MG: 5 TABLET ORAL at 17:22

## 2022-06-11 RX ADMIN — DOCUSATE SODIUM 100 MG: 100 CAPSULE, LIQUID FILLED ORAL at 08:47

## 2022-06-11 RX ADMIN — ASPIRIN 81 MG: 81 TABLET, COATED ORAL at 08:47

## 2022-06-11 RX ADMIN — INSULIN LISPRO 4 UNITS: 100 INJECTION, SOLUTION INTRAVENOUS; SUBCUTANEOUS at 22:15

## 2022-06-11 RX ADMIN — AZITHROMYCIN MONOHYDRATE 500 MG: 250 TABLET ORAL at 22:14

## 2022-06-11 RX ADMIN — FLUTICASONE FUROATE AND VILANTEROL TRIFENATATE 1 PUFF: 200; 25 POWDER RESPIRATORY (INHALATION) at 08:47

## 2022-06-11 RX ADMIN — UMECLIDINIUM 1 PUFF: 62.5 AEROSOL, POWDER ORAL at 08:47

## 2022-06-11 RX ADMIN — CYCLOBENZAPRINE HYDROCHLORIDE 10 MG: 10 TABLET, FILM COATED ORAL at 08:47

## 2022-06-11 RX ADMIN — CYCLOBENZAPRINE HYDROCHLORIDE 10 MG: 10 TABLET, FILM COATED ORAL at 22:14

## 2022-06-11 RX ADMIN — SODIUM CHLORIDE 6 UNITS/HR: 9 INJECTION, SOLUTION INTRAVENOUS at 13:35

## 2022-06-11 RX ADMIN — CARVEDILOL 6.25 MG: 6.25 TABLET, FILM COATED ORAL at 08:52

## 2022-06-11 RX ADMIN — FUROSEMIDE 40 MG: 40 TABLET ORAL at 08:47

## 2022-06-11 RX ADMIN — DOCUSATE SODIUM 100 MG: 100 CAPSULE, LIQUID FILLED ORAL at 17:22

## 2022-06-11 RX ADMIN — SENNOSIDES AND DOCUSATE SODIUM 1 TABLET: 50; 8.6 TABLET ORAL at 17:22

## 2022-06-11 RX ADMIN — SENNOSIDES AND DOCUSATE SODIUM 1 TABLET: 50; 8.6 TABLET ORAL at 08:47

## 2022-06-11 RX ADMIN — FERROUS SULFATE TAB 325 MG (65 MG ELEMENTAL FE) 325 MG: 325 (65 FE) TAB at 08:52

## 2022-06-11 RX ADMIN — GUAIFENESIN 600 MG: 600 TABLET, EXTENDED RELEASE ORAL at 17:22

## 2022-06-11 RX ADMIN — CARVEDILOL 6.25 MG: 6.25 TABLET, FILM COATED ORAL at 17:22

## 2022-06-11 RX ADMIN — INSULIN HUMAN 50 UNITS: 500 INJECTION, SOLUTION SUBCUTANEOUS at 17:26

## 2022-06-11 RX ADMIN — LISINOPRIL 10 MG: 10 TABLET ORAL at 08:47

## 2022-06-11 RX ADMIN — ENOXAPARIN SODIUM 40 MG: 40 INJECTION SUBCUTANEOUS at 08:47

## 2022-06-11 RX ADMIN — PRAVASTATIN SODIUM 80 MG: 80 TABLET ORAL at 17:22

## 2022-06-11 RX ADMIN — POTASSIUM CHLORIDE 20 MEQ: 1500 TABLET, EXTENDED RELEASE ORAL at 08:47

## 2022-06-11 NOTE — ASSESSMENT & PLAN NOTE
Lab Results   Component Value Date    HGBA1C 9 2 (H) 04/22/2022       Recent Labs     06/11/22  0405 06/11/22  0603 06/11/22  0810 06/11/22  1011   POCGLU 100 133 146* 342*       Blood Sugar Average: Last 72 hrs:  (P) 218 [id number]   · A1C above goal, glucose markedly elevated 2/2 IV steroids that he received on admission  · Patient was placed on insulin gtt--steroids stopped on 6/10    Appreciate endocrinology consultation in order to transition from insulin drip back to concentrated insulin regimen for discharge tentatively 6/12  · Home regimen: concentrated insulin 45 units TIDWM

## 2022-06-11 NOTE — ASSESSMENT & PLAN NOTE
FEV1/FVC 41%, FEV1 33%, on baseline 2-3L  · With suspected acute exacerbation, started on IV steroids however pulmonary evaluated and do not feel this is acute exacerbation  · Steroids stopped  · Continue nebs, inhalers

## 2022-06-11 NOTE — PROGRESS NOTES
[address] [facility]  Progress Note - Hina Ruano Sr  1945, 78 y o  male MRN: [id number]  [facility] Encounter: [id number]  Primary Care Provider: Radhika Erickson MD   Date and time admitted to hospital: 6/9/2022  5:27 PM    * Shortness of breath  Assessment & Plan  Presented with worsening SOB x few weeks along with nonproductive cough  ETIOLOGY UNCLEAR  · CXR negative for acute disease, does show post lobectomy changes, R apex pulmonary scarring and persistent R basilar opacity likely reflecting combination of pleural fluid and atelectasis  · COVID/Flu/RSV negative  · Not due to acute COPD exacerbation per pulmonary  · Typically on 2L via NC at baseline--at baseline  · Will not repeat echo as recently had one  · S/P 1x dose of IV lasix though does not appear grossly volume overloaded  · Plan to dc home 6/12 as long as can come off insulin gtt etc  Refused to work with PT/OT thus cannot receive home services     Adenocarcinoma of lung [facility])  [address] with radiation oncology as outpatient, most recent office note reviewed from 9/2021  · Recent CTA chest 3/29/22 without acute findings in chest  · Continue outpatient f/u      CAD (coronary artery disease)  Assessment & Plan  history of CAD with prior stenting (D2 and RCA x2 prior to 2015)  Has had recurrent chest pain for weeks--months   Was recently seen by cardiology during admission early May 2022 and had negative nuclear stress test  Recommended for outpatient f/u  · Continue aspirin carvedilol statin    COPD, severe ([address] )  Assessment & Plan  FEV1/FVC 41%, FEV1 33%, on baseline 2-3L  · With suspected acute exacerbation, started on IV steroids however pulmonary evaluated and do not feel this is acute exacerbation  · Steroids stopped  · Continue nebs, inhalers    CKD (chronic kidney disease) stage 3, GFR 30-59 ml/min [facility])  Assessment & Plan  Lab Results   Component Value Date    EGFR 65 06/11/2022 EGFR 55 06/10/2022    EGFR 56 06/09/2022    CREATININE 1 11 06/11/2022    CREATININE 1 26 06/10/2022    CREATININE 1 24 06/09/2022   Baseline appears around 1 2-1 4; creat stable at baseline for now  · Avoid hypotension  · Continues on home dose lasix 40 mg daily  · monitor BMP    Chronic respiratory failure with hypoxia (HCC)  Assessment & Plan  · On baseline 2-3L NC     Chronic diastolic heart failure (HCC)  Assessment & Plan  Wt Readings from Last 3 Encounters:   06/09/22 99 8 kg (220 lb)   05/17/22 98 9 kg (218 lb)   05/08/22 103 kg (226 lb)     Recent echo reviewed, appears euvolemic  He is status post 1 time dose of IV Lasix on 06/10  · Continue home dose lasix        Venous stasis dermatitis of both lower extremities  Assessment & Plan  · Keep extremities elevated    Type 2 diabetes mellitus with hyperglycemia, with long-term current use of insulin [facility])  Assessment & Plan  Lab Results   Component Value Date    HGBA1C 9 2 (H) 04/22/2022       Recent Labs     06/11/22  0405 06/11/22  0603 06/11/22  0810 06/11/22  1011   POCGLU 100 133 146* 342*       Blood Sugar Average: Last 72 hrs:  (P) 218 [id number]   · A1C above goal, glucose markedly elevated 2/2 IV steroids that he received on admission  · Patient was placed on insulin gtt--steroids stopped on 6/10  Appreciate endocrinology consultation in order to transition from insulin drip back to concentrated insulin regimen for discharge tentatively 6/12  · Home regimen: concentrated insulin 45 units TIDWM    HLD (hyperlipidemia)  Assessment & Plan  · Continue atorvastatin        VTE Pharmacologic Prophylaxis: VTE Score: 4 Moderate Risk (Score 3-4) - Pharmacological DVT Prophylaxis Ordered: enoxaparin (Lovenox)  Patient Centered Rounds: I performed bedside rounds with nursing staff today    Discussions with Specialists or Other Care Team Provider:  Discussed with endocrinology directly    Education and Discussions with Family / Patient: Updated contact person (son) via phone  Time Spent for Care: 30 minutes  More than 50% of total time spent on counseling and coordination of care as described above  Current Length of Stay: 2 day(s)  Current Patient Status: Inpatient   Certification Statement: The patient will continue to require additional inpatient hospital stay due to Weaning of IV insulin drip back to home regimen  Discharge Plan: Anticipate discharge tomorrow to home  Code Status: Level 1 - Full Code    Subjective:   Patient reports feeling better today  Denies any shortness of breath  Only mild cough  No other additional complaints  Objective:     Vitals:   Temp (24hrs), Av 9 °F (37 2 °C), Min:98 8 °F (37 1 °C), Max:99 °F (37 2 °C)    Temp:  [98 8 °F (37 1 °C)-99 °F (37 2 °C)] 99 °F (37 2 °C)  HR:  [55-74] 74  Resp:  [18] 18  BP: (128-153)/(56-68) 153/68  SpO2:  [97 %-99 %] 97 %  Body mass index is 29 84 kg/m²  Input and Output Summary (last 24 hours): Intake/Output Summary (Last 24 hours) at 2022 1053  Last data filed at 6/10/2022 2300  Gross per 24 hour   Intake 360 ml   Output 2455 ml   Net -2095 ml       Physical Exam:   Physical Exam  Vitals and nursing note reviewed  Constitutional:       General: He is not in acute distress  Comments: Oxygen not in nares at time of my evaluation, O2 sats 98%   Cardiovascular:      Rate and Rhythm: Normal rate and regular rhythm  Pulmonary:      Effort: No respiratory distress  Abdominal:      General: There is no distension  Musculoskeletal:      Right lower leg: No edema  Left lower leg: No edema  Neurological:      Mental Status: He is oriented to person, place, and time     Psychiatric:      Comments: Flat          Additional Data:     Labs:  Results from last 7 days   Lab Units 22  0540   WBC Thousand/uL 16 19*   HEMOGLOBIN g/dL 12 4   HEMATOCRIT % 40 1   PLATELETS Thousands/uL 171   NEUTROS PCT % 84*   LYMPHS PCT % 7*   MONOS PCT % 9   EOS PCT % 0     Results from last 7 days   Lab Units 06/11/22  0540 06/10/22  0557 06/09/22  1816   SODIUM mmol/L 138   < > 134*   POTASSIUM mmol/L 4 0   < > 4 8   CHLORIDE mmol/L 101   < > 98*   CO2 mmol/L 31   < > 29   BUN mg/dL 28*   < > 23   CREATININE mg/dL 1 11   < > 1 24   ANION GAP mmol/L 6   < > 7   CALCIUM mg/dL 9 0   < > 9 2   ALBUMIN g/dL  --   --  3 1*   TOTAL BILIRUBIN mg/dL  --   --  0 46   ALK PHOS U/L  --   --  97   ALT U/L  --   --  18   AST U/L  --   --  10   GLUCOSE RANDOM mg/dL 103   < > 275*    < > = values in this interval not displayed  Results from last 7 days   Lab Units 06/11/22  1011 06/11/22  0810 06/11/22  0603 06/11/22  0405 06/11/22  0205 06/11/22  0001 06/10/22  2213 06/10/22  2006 06/10/22  1806 06/10/22  1550 06/10/22  1409 06/10/22  1211   POC GLUCOSE mg/dl 342* 146* 133 100 180* 119 118 155* 195* 201* 276* 170*         Results from last 7 days   Lab Units 06/09/22  2054 06/09/22  1816   PROCALCITONIN ng/ml 0 08 0 09       Lines/Drains:  Invasive Devices  Report    Peripheral Intravenous Line  Duration           Peripheral IV 06/09/22 Right;Ventral (anterior) Forearm 1 day                      Imaging: No pertinent imaging reviewed      Recent Cultures (last 7 days):         Last 24 Hours Medication List:   Current Facility-Administered Medications   Medication Dose Route Frequency Provider Last Rate    acetaminophen  650 mg Oral Q6H PRN Morena Mejia MD      albuterol  2 puff Inhalation Q4H PRN Robert Oleary MD      aluminum-magnesium hydroxide-simethicone  30 mL Oral Q6H PRN Mathieu Molina MD      aspirin  81 mg Oral Daily Mathieu Molina MD      azithromycin  500 mg Oral Q24H Mathieu Molina MD      carvedilol  6 25 mg Oral BID With Meals Mathieu Molina MD      cyclobenzaprine  10 mg Oral BID Mathieu Molina MD      docusate sodium  100 mg Oral BID Mathieu Molina MD      enoxaparin  40 mg Subcutaneous Daily Mathieu Molina MD      ferrous sulfate  325 mg Oral Daily With Breakfast Sheng Hernández MD      fluticasone-vilanterol  1 puff Inhalation Daily Birtha Blush, DO      furosemide  40 mg Oral Daily Sheng Hernández MD      guaiFENesin  600 mg Oral BID Sheng Hernández MD      insulin regular (HumuLIN R,NovoLIN R) infusion  0 3-21 Units/hr Intravenous Titrated Denise PAULA CanoNP 15 Units/hr (06/11/22 1012)    lisinopril  10 mg Oral Daily Sheng Hernández MD      ondansetron  4 mg Intravenous Q6H PRN Sheng Hernández MD      oxyCODONE  2 5 mg Oral Q4H PRN Sheng Hernández MD      oxyCODONE  5 mg Oral Q4H PRN Sheng Hernández MD      potassium chloride  20 mEq Oral Daily Sheng Hernández MD      pravastatin  80 mg Oral Daily With Nora Jean MD      senna-docusate sodium  1 tablet Oral BID Sheng Hernández MD      umeclidinium bromide  1 puff Inhalation Daily Birtha Blush, DO          Today, Patient Was Seen By: Pamela Brush PA-C    **Please Note: This note may have been constructed using a voice recognition system  **

## 2022-06-11 NOTE — ASSESSMENT & PLAN NOTE
Presented with worsening SOB x few weeks along with nonproductive cough   ETIOLOGY UNCLEAR  · CXR negative for acute disease, does show post lobectomy changes, R apex pulmonary scarring and persistent R basilar opacity likely reflecting combination of pleural fluid and atelectasis  · COVID/Flu/RSV negative  · Not due to acute COPD exacerbation per pulmonary  · Typically on 2L via NC at baseline--at baseline  · Will not repeat echo as recently had one  · S/P 1x dose of IV lasix though does not appear grossly volume overloaded  · Plan to dc home 6/12 as long as can come off insulin gtt etc  Refused to work with PT/OT thus cannot receive home services

## 2022-06-11 NOTE — ASSESSMENT & PLAN NOTE
Wt Readings from Last 3 Encounters:   06/09/22 99 8 kg (220 lb)   05/17/22 98 9 kg (218 lb)   05/08/22 103 kg (226 lb)     Recent echo reviewed, appears euvolemic    He is status post 1 time dose of IV Lasix on 06/10  · Continue home dose lasix

## 2022-06-11 NOTE — PLAN OF CARE
Problem: Prexisting or High Potential for Compromised Skin Integrity  Goal: Skin integrity is maintained or improved  Description: INTERVENTIONS:  - Identify patients at risk for skin breakdown  - Assess and monitor skin integrity  - Assess and monitor nutrition and hydration status  - Monitor labs   - Assess for incontinence   - Turn and reposition patient  - Assist with mobility/ambulation  - Relieve pressure over bony prominences  - Avoid friction and shearing  - Provide appropriate hygiene as needed including keeping skin clean and dry  - Evaluate need for skin moisturizer/barrier cream  - Collaborate with interdisciplinary team   - Patient/family teaching  - Consider wound care consult   Outcome: Progressing     Problem: PAIN - ADULT  Goal: Verbalizes/displays adequate comfort level or baseline comfort level  Description: Interventions:  - Encourage patient to monitor pain and request assistance  - Assess pain using appropriate pain scale  - Administer analgesics based on type and severity of pain and evaluate response  - Implement non-pharmacological measures as appropriate and evaluate response  - Consider cultural and social influences on pain and pain management  - Notify physician/advanced practitioner if interventions unsuccessful or patient reports new pain  Outcome: Progressing     Problem: INFECTION - ADULT  Goal: Absence or prevention of progression during hospitalization  Description: INTERVENTIONS:  - Assess and monitor for signs and symptoms of infection  - Monitor lab/diagnostic results  - Monitor all insertion sites, i e  indwelling lines, tubes, and drains  - Monitor endotracheal if appropriate and nasal secretions for changes in amount and color  - [place] appropriate cooling/warming therapies per order  - Administer medications as ordered  - Instruct and encourage patient and family to use good hand hygiene technique  - Identify and instruct in appropriate isolation precautions for identified infection/condition  Outcome: Progressing     Problem: DISCHARGE PLANNING  Goal: Discharge to home or other facility with appropriate resources  Description: INTERVENTIONS:  - Identify barriers to discharge w/patient and caregiver  - Arrange for needed discharge resources and transportation as appropriate  - Identify discharge learning needs (meds, wound care, etc )  - Arrange for interpretive services to assist at discharge as needed  - Refer to Case Management Department for coordinating discharge planning if the patient needs post-hospital services based on physician/advanced practitioner order or complex needs related to functional status, cognitive ability, or social support system  Outcome: Progressing     Problem: Knowledge Deficit  Goal: Patient/family/caregiver demonstrates understanding of disease process, treatment plan, medications, and discharge instructions  Description: Complete learning assessment and assess knowledge base    Interventions:  - Provide teaching at level of understanding  - Provide teaching via preferred learning methods  Outcome: Progressing     Problem: RESPIRATORY - ADULT  Goal: Achieves optimal ventilation and oxygenation  Description: INTERVENTIONS:  - Assess for changes in respiratory status  - Assess for changes in mentation and behavior  - Position to facilitate oxygenation and minimize respiratory effort  - Oxygen administered by appropriate delivery if ordered  - Initiate smoking cessation education as indicated  - Encourage broncho-pulmonary hygiene including cough, deep breathe, Incentive Spirometry  - Assess the need for suctioning and aspirate as needed  - Assess and instruct to report SOB or any respiratory difficulty  - Respiratory Therapy support as indicated  Outcome: Progressing     Problem: METABOLIC, FLUID AND ELECTROLYTES - ADULT  Goal: Electrolytes maintained within normal limits  Description: INTERVENTIONS:  - Monitor labs and assess patient for signs and symptoms of electrolyte imbalances  - Administer electrolyte replacement as ordered  - Monitor response to electrolyte replacements, including repeat lab results as appropriate  - Instruct patient on fluid and nutrition as appropriate  Outcome: Progressing  Goal: Fluid balance maintained  Description: INTERVENTIONS:  - Monitor labs   - Monitor I/O and WT  - Instruct patient on fluid and nutrition as appropriate  - Assess for signs & symptoms of volume excess or deficit  Outcome: Progressing  Goal: Glucose maintained within target range  Description: INTERVENTIONS:  - Monitor Blood Glucose as ordered  - Assess for signs and symptoms of hyperglycemia and hypoglycemia  - Administer ordered medications to maintain glucose within target range  - Assess nutritional intake and initiate nutrition service referral as needed  Outcome: Progressing

## 2022-06-11 NOTE — RESPIRATORY THERAPY NOTE
RT Protocol Note  Monique Bolaños Sr  78 y o  male MRN: [id number]  [facility] Encounter: [id number]    Assessment    Principal Problem:    Shortness of breath  Active Problems:    HLD (hyperlipidemia)    Type 2 diabetes mellitus with hyperglycemia, with long-term current use of insulin (HCC)    Venous stasis dermatitis of both lower extremities    COPD, severe (HCC)    Chronic diastolic heart failure (HCC)    CAD (coronary artery disease)    Chronic respiratory failure with hypoxia (HCC)    Adenocarcinoma of lung (HCC)    CKD (chronic kidney disease) stage 3, GFR 30-59 ml/min (HCC)      Home Pulmonary Medications:mdi's  Home Devices/Therapy: Home O2    Past Medical History:   Diagnosis Date    Abdominal pain     MARANDA (acute kidney injury) ([facility] ) 2018    Cardiac disease     CHF (congestive heart failure) ([facility])     COPD, severe (HCC)     Coronary artery disease     DDD (degenerative disc disease), lumbar 2020    Diabetes mellitus ([facility])     Dyspnea     GERD (gastroesophageal reflux disease)     Hyperlipidemia     Hypertension     MI (myocardial infarction) ([place] [id number] )     with 3 stents    Nodule of apex of right lung     JACQUELINE (obstructive sleep apnea)     Prostate cancer ([place] [address] )      Social History     Socioeconomic History    Marital status:       Spouse name: None    Number of children: 1    Years of education: 8    Highest education level: None   Occupational History    None   Tobacco Use    Smoking status: Former Smoker     Packs/day: 1 50     Years: 50 00     Pack years: 75 00     Start date: 38     Quit date: 2020     Years since quittin 9    Smokeless tobacco: Never Used   Vaping Use    Vaping Use: Never used   Substance and Sexual Activity    Alcohol use: Never    Drug use: No    Sexual activity: Never     Partners: Female   Other Topics Concern    None   Social History Narrative    Most recent tobacco use screenin2018    Do you currently or have you served in the [address] [place] [address] Forces: No    Were you activated, into active duty, as a member of the [employer] or as a Reservist: No    Caffeine intake: Moderate    Alcohol intake: None    Marital status:     Number of children: 1    Education: 8    Occupation: retired    Sexual orientation: Heterosexual    General stress level: Medium    Live alone or with others: with others    Exercise level: None    Sexually active: No    Overweight: Yes    Obese: Yes    Guns present in home: No    Seat belts used routinely: Yes    Advance directive: No    Sunscreen used routinely: No    Smoke alarm in home: Yes    Legally blind in one or both eyes: No    Hard of hearing or deaf in one or both ears: No     Social Determinants of Health     Financial Resource Strain: Not on file   Food Insecurity: No Food Insecurity    Worried About Running Out of Food in the Last Year: Never true    [address] in the Last Year: Never true   Transportation Needs: No Transportation Needs    Lack of Transportation (Medical): No    Lack of Transportation (Non-Medical): No   Physical Activity: Not on file   Stress: Not on file   Social Connections: Not on file   Intimate Partner Violence: Not on file   Housing Stability: Low Risk     Unable to Pay for Housing in the Last Year: No    Number of Places Lived in the Last Year: 1    Unstable Housing in the Last Year: No       Subjective         Objective    Physical Exam:   Cough: None    Vitals:  Blood pressure 153/68, pulse 74, temperature 99 °F (37 2 °C), resp  rate 18, height 6' (1 829 m), weight 99 8 kg (220 lb), SpO2 97 %  Imaging and other studies: I have personally reviewed pertinent reports        O2 Device: NC     Plan    Respiratory Plan: Mild Distress pathway  Airway Clearance Plan: Flutter     Resp Comments: (P) ACP/flutter valve d/c'd per protocol, pt can do on his ownk, d/c'd resp protocol, pt takes mdi's at home

## 2022-06-11 NOTE — PROGRESS NOTES
Progress Note - Roselia Terry Sr  78 y o  male MRN: [id number]    Unit/Bed#: [facility] 823-01 Encounter: [id number]      CC: diabetes f/u    Subjective:   Laureano Woodard  is a 78y o  year old male with type 2 diabetes  Feels better, good appetite, no nausea vomiting  No complaints  No hypoglycemia  Steroids were stopped yesterday-at home he takes U500 regular insulin 45 units before breakfast and lunch and 35 units before dinner  Reports sugars at home usually 200 to 300s  Objective:     Vitals: Blood pressure 153/68, pulse 74, temperature 99 °F (37 2 °C), resp  rate 18, height 6' (1 829 m), weight 99 8 kg (220 lb), SpO2 97 %  ,Body mass index is 29 84 kg/m²  Intake/Output Summary (Last 24 hours) at 6/11/2022 1130  Last data filed at 6/10/2022 2300  Gross per 24 hour   Intake 360 ml   Output 2130 ml   Net -1770 ml       Physical Exam:  General Appearance: awake, appears stated age and cooperative  Head: Normocephalic, without obvious abnormality, atraumatic  Extremities: moves all extremities , no edema bilateral lower extremities  Skin:  Chronic vascular changes bilateral lower extremities  Pulm: normal effort     Lab, Imaging and other studies: I have personally reviewed pertinent reports  POC Glucose (mg/dl)   Date Value   06/11/2022 342 (H)   06/11/2022 146 (H)   06/11/2022 133   06/11/2022 100   06/11/2022 180 (H)   06/11/2022 119   06/10/2022 118   06/10/2022 155 (H)   06/10/2022 195 (H)   06/10/2022 201 (H)       Assessment:  Type 2 diabetes with hyperglycemia on long-term insulin therapy  Chronic kidney disease  COPD-off  steroid since yesterday    Plan: Will switch to subcu insulin therapy-start U 500 regular insulin 50 units before meals  IV insulin infusion to be stopped 1 hour after  pre dinner doses given  Monitor blood sugars before meals and bedtime adjust accordingly      Portions of the record may have been created with voice recognition software

## 2022-06-12 VITALS
WEIGHT: 220 LBS | BODY MASS INDEX: 29.8 KG/M2 | DIASTOLIC BLOOD PRESSURE: 57 MMHG | OXYGEN SATURATION: 91 % | TEMPERATURE: 98.9 F | HEART RATE: 74 BPM | SYSTOLIC BLOOD PRESSURE: 130 MMHG | HEIGHT: 72 IN | RESPIRATION RATE: 20 BRPM

## 2022-06-12 LAB
ANION GAP SERPL CALCULATED.3IONS-SCNC: 6 MMOL/L (ref 4–13)
BASOPHILS # BLD AUTO: 0.03 THOUSANDS/ΜL (ref 0–0.1)
BASOPHILS NFR BLD AUTO: 0 % (ref 0–1)
BUN SERPL-MCNC: 25 MG/DL (ref 5–25)
CALCIUM SERPL-MCNC: 8.7 MG/DL (ref 8.3–10.1)
CHLORIDE SERPL-SCNC: 100 MMOL/L (ref 100–108)
CO2 SERPL-SCNC: 32 MMOL/L (ref 21–32)
CREAT SERPL-MCNC: 1.21 MG/DL (ref 0.6–1.3)
EOSINOPHIL # BLD AUTO: 0.04 THOUSAND/ΜL (ref 0–0.61)
EOSINOPHIL NFR BLD AUTO: 0 % (ref 0–6)
ERYTHROCYTE [DISTWIDTH] IN BLOOD BY AUTOMATED COUNT: 15.8 % (ref 11.6–15.1)
GFR SERPL CREATININE-BSD FRML MDRD: 58 ML/MIN/1.73SQ M
GLUCOSE SERPL-MCNC: 123 MG/DL (ref 65–140)
GLUCOSE SERPL-MCNC: 216 MG/DL (ref 65–140)
GLUCOSE SERPL-MCNC: 293 MG/DL (ref 65–140)
HCT VFR BLD AUTO: 38.2 % (ref 36.5–49.3)
HGB BLD-MCNC: 11.8 G/DL (ref 12–17)
IMM GRANULOCYTES # BLD AUTO: 0.07 THOUSAND/UL (ref 0–0.2)
IMM GRANULOCYTES NFR BLD AUTO: 1 % (ref 0–2)
LYMPHOCYTES # BLD AUTO: 0.98 THOUSANDS/ΜL (ref 0.6–4.47)
LYMPHOCYTES NFR BLD AUTO: 7 % (ref 14–44)
MCH RBC QN AUTO: 25.3 PG (ref 26.8–34.3)
MCHC RBC AUTO-ENTMCNC: 30.9 G/DL (ref 31.4–37.4)
MCV RBC AUTO: 82 FL (ref 82–98)
MONOCYTES # BLD AUTO: 1.47 THOUSAND/ΜL (ref 0.17–1.22)
MONOCYTES NFR BLD AUTO: 10 % (ref 4–12)
NEUTROPHILS # BLD AUTO: 12.14 THOUSANDS/ΜL (ref 1.85–7.62)
NEUTS SEG NFR BLD AUTO: 82 % (ref 43–75)
NRBC BLD AUTO-RTO: 0 /100 WBCS
PLATELET # BLD AUTO: 145 THOUSANDS/UL (ref 149–390)
PMV BLD AUTO: 11 FL (ref 8.9–12.7)
POTASSIUM SERPL-SCNC: 4.2 MMOL/L (ref 3.5–5.3)
RBC # BLD AUTO: 4.66 MILLION/UL (ref 3.88–5.62)
SODIUM SERPL-SCNC: 138 MMOL/L (ref 136–145)
WBC # BLD AUTO: 14.73 THOUSAND/UL (ref 4.31–10.16)

## 2022-06-12 PROCEDURE — 80048 BASIC METABOLIC PNL TOTAL CA: CPT | Performed by: INTERNAL MEDICINE

## 2022-06-12 PROCEDURE — 85025 COMPLETE CBC W/AUTO DIFF WBC: CPT | Performed by: INTERNAL MEDICINE

## 2022-06-12 PROCEDURE — 82948 REAGENT STRIP/BLOOD GLUCOSE: CPT

## 2022-06-12 PROCEDURE — 99239 HOSP IP/OBS DSCHRG MGMT >30: CPT | Performed by: INTERNAL MEDICINE

## 2022-06-12 RX ADMIN — ASPIRIN 81 MG: 81 TABLET, COATED ORAL at 09:22

## 2022-06-12 RX ADMIN — POTASSIUM CHLORIDE 20 MEQ: 1500 TABLET, EXTENDED RELEASE ORAL at 09:22

## 2022-06-12 RX ADMIN — ALBUTEROL SULFATE 2 PUFF: 90 AEROSOL, METERED RESPIRATORY (INHALATION) at 09:23

## 2022-06-12 RX ADMIN — SENNOSIDES AND DOCUSATE SODIUM 1 TABLET: 50; 8.6 TABLET ORAL at 09:22

## 2022-06-12 RX ADMIN — FUROSEMIDE 40 MG: 40 TABLET ORAL at 09:22

## 2022-06-12 RX ADMIN — INSULIN LISPRO 12 UNITS: 100 INJECTION, SOLUTION INTRAVENOUS; SUBCUTANEOUS at 12:00

## 2022-06-12 RX ADMIN — GUAIFENESIN 600 MG: 600 TABLET, EXTENDED RELEASE ORAL at 09:22

## 2022-06-12 RX ADMIN — ENOXAPARIN SODIUM 40 MG: 40 INJECTION SUBCUTANEOUS at 09:22

## 2022-06-12 RX ADMIN — DOCUSATE SODIUM 100 MG: 100 CAPSULE, LIQUID FILLED ORAL at 09:22

## 2022-06-12 RX ADMIN — FLUTICASONE FUROATE AND VILANTEROL TRIFENATATE 1 PUFF: 200; 25 POWDER RESPIRATORY (INHALATION) at 09:23

## 2022-06-12 RX ADMIN — CYCLOBENZAPRINE HYDROCHLORIDE 10 MG: 10 TABLET, FILM COATED ORAL at 09:22

## 2022-06-12 RX ADMIN — CARVEDILOL 6.25 MG: 6.25 TABLET, FILM COATED ORAL at 09:30

## 2022-06-12 RX ADMIN — FERROUS SULFATE TAB 325 MG (65 MG ELEMENTAL FE) 325 MG: 325 (65 FE) TAB at 09:30

## 2022-06-12 RX ADMIN — LISINOPRIL 10 MG: 10 TABLET ORAL at 09:22

## 2022-06-12 RX ADMIN — INSULIN LISPRO 8 UNITS: 100 INJECTION, SOLUTION INTRAVENOUS; SUBCUTANEOUS at 09:24

## 2022-06-12 RX ADMIN — UMECLIDINIUM 1 PUFF: 62.5 AEROSOL, POWDER ORAL at 09:23

## 2022-06-12 RX ADMIN — INSULIN HUMAN 50 UNITS: 500 INJECTION, SOLUTION SUBCUTANEOUS at 09:30

## 2022-06-12 RX ADMIN — INSULIN HUMAN 50 UNITS: 500 INJECTION, SOLUTION SUBCUTANEOUS at 12:02

## 2022-06-12 NOTE — ASSESSMENT & PLAN NOTE
Presented with worsening SOB x few weeks along with nonproductive cough   ETIOLOGY UNCLEAR  · CXR negative for acute disease, does show post lobectomy changes, R apex pulmonary scarring and persistent R basilar opacity likely reflecting combination of pleural fluid and atelectasis  · COVID/Flu/RSV negative  · Not due to acute COPD exacerbation per pulmonary  · Typically on 2L via NC at baseline--at baseline  · Will not repeat echo as recently had one  · S/P 1x dose of IV lasix though does not appear grossly volume overloaded  · Refused to work with PT/OT thus could not arrange home services

## 2022-06-12 NOTE — DISCHARGE SUMMARY
[address] [facility]  Discharge- Era Choudhury Sr  1945, 78 y o  male MRN: [id number]  Unit/Bed#: [facility] 823-01 Encounter: [id number]  Primary Care Provider: Dalton Gee MD   Date and time admitted to hospital: 6/9/2022  5:27 PM    * Shortness of breath  Assessment & Plan  Presented with worsening SOB x few weeks along with nonproductive cough  ETIOLOGY UNCLEAR  · CXR negative for acute disease, does show post lobectomy changes, R apex pulmonary scarring and persistent R basilar opacity likely reflecting combination of pleural fluid and atelectasis  · COVID/Flu/RSV negative  · Not due to acute COPD exacerbation per pulmonary  · Typically on 2L via NC at baseline--at baseline  · Will not repeat echo as recently had one  · S/P 1x dose of IV lasix though does not appear grossly volume overloaded  · Refused to work with PT/OT thus could not arrange home services     Adenocarcinoma of lung [facility])  [address] with radiation oncology as outpatient, most recent office note reviewed from 9/2021  · Recent CTA chest 3/29/22 without acute findings in chest  · Continue outpatient f/u      CAD (coronary artery disease)  Assessment & Plan  history of CAD with prior stenting (D2 and RCA x2 prior to 2015)  Has had recurrent chest pain for weeks--months   Was recently seen by cardiology during admission early May 2022 and had negative nuclear stress test  Recommended for outpatient f/u  · Continue aspirin carvedilol statin    COPD, severe ([address] )  Assessment & Plan  FEV1/FVC 41%, FEV1 33%, on baseline 2-3L  · With suspected acute exacerbation, started on IV steroids however pulmonary evaluated and do not feel this is acute exacerbation  · Steroids stopped  · Continue nebs, inhalers    CKD (chronic kidney disease) stage 3, GFR 30-59 ml/min [facility])  Assessment & Plan  Lab Results   Component Value Date    EGFR 58 06/12/2022    EGFR 65 06/11/2022    EGFR 55 06/10/2022    CREATININE 1 21 06/12/2022    CREATININE 1 11 06/11/2022    CREATININE 1 26 06/10/2022   Baseline appears around 1 2-1 4; creat stable at baseline for now  · Avoid hypotension  · Continues on home dose lasix 40 mg daily  · monitor BMP    Chronic respiratory failure with hypoxia (HCC)  Assessment & Plan  · On baseline 2-3L NC     Chronic diastolic heart failure (HCC)  Assessment & Plan  Wt Readings from Last 3 Encounters:   06/09/22 99 8 kg (220 lb)   05/17/22 98 9 kg (218 lb)   05/08/22 103 kg (226 lb)     Recent echo reviewed, appears euvolemic  He is status post 1 time dose of IV Lasix on 06/10  · Continue home dose lasix        Venous stasis dermatitis of both lower extremities  Assessment & Plan  · Keep extremities elevated    Type 2 diabetes mellitus with hyperglycemia, with long-term current use of insulin [facility])  Assessment & Plan  Lab Results   Component Value Date    HGBA1C 9 2 (H) 04/22/2022       Recent Labs     06/11/22  1556 06/11/22  1726 06/11/22  2119 06/12/22  0823   POCGLU 103 106 231* 216*       Blood Sugar Average: Last 72 hrs:  (P) 203  125   · A1C above goal, glucose markedly elevated 2/2 IV steroids that he received on admission  · Patient was placed on insulin gtt--steroids stopped on 6/10      · Appreciate endocrinology consultation in order to transition from insulin drip back to concentrated insulin regimen which was done 6/11  · Home regimen: concentrated insulin 45 units TIDWM    HLD (hyperlipidemia)  Assessment & Plan  · Continue atorvastatin        Medical Problems             Resolved Problems  Date Reviewed: 6/12/2022   None               Discharging Physician / Practitioner: Bishop Siemens, PA-C  PCP: Phil Mooney MD  Admission Date:   Admission Orders (From admission, onward)     Ordered        06/09/22 1935  Inpatient Admission  Once                      Discharge Date: 06/12/22    Consultations During Hospital Stay:  · Pulmonology    Procedures Performed:   · Sputum culture: pending  · COVID/Flu/RSV: negative x4  · CXR: Stable post lobectomy changes with right apex pulmonary scarring and persistent right basilar opacity likely reflecting a combination of pleural fluid and compressive atelectasis   No acute cardiopulmonary disease  Significant Findings / Test Results:   · See above    Incidental Findings:   · See above     Test Results Pending at Discharge (will require follow up):   · none     Outpatient Tests Requested:  · F/u cards  · F/u pulm  · F/u PCP    Complications:  none    Reason for Admission: SOB    Hospital Course:   Jose Manuel Marie  is a 78 y o  male patient with extensive PMHx of lung adenocarcinoma, CAD s/p stenting, severe COPD, chronic hypoxic respiratoroy failure on 2-3L via NC at baseline, CKD stage 3 (baseline creatinine 1 2-1 4), venous stasis dermatitis, uncontrolled type 2 DM on insulin and HLD who originally presented to the hospital on 6/9/2022 due to shortness of breath  Initially was felt to be possible COPD exacerbation however pulmonlogy saw patient and did not agree with this  Steroids stopped  Given 1x dose of IV lasix however did not examine grossly volume overloaded  Suspect his SOB was multifactorial in setting of chronic issues as above  Refused to work with PT/OT during hospital stay  Respiratory status at baseline  Stable for discharge home  Please see above list of diagnoses and related plan for additional information  Condition at Discharge: stable    Discharge Day Visit / Exam:   Subjective:  Doing fine  Eating breakfast without any oxygen on and O2 sats 96%  Feels at baseline     Vitals: Blood Pressure: 130/57 (06/12/22 0724)  Pulse: 74 (06/12/22 0724)  Temperature: 98 9 °F (37 2 °C) (06/12/22 0724)  Temp Source: Oral (06/09/22 2329)  Respirations: 20 (06/12/22 0724)  Height: 6' (182 9 cm) (06/09/22 2018)  Weight - Scale: 99 8 kg (220 lb) (06/09/22 2018)  SpO2: 91 % (06/12/22 0724)  Exam:   Physical Exam  Vitals and nursing note reviewed  Constitutional:       Comments: Was on RA at time of my evaluation    Cardiovascular:      Rate and Rhythm: Normal rate and regular rhythm  Pulmonary:      Effort: No respiratory distress  Breath sounds: No wheezing or rales  Abdominal:      General: There is no distension  Tenderness: There is no abdominal tenderness  Musculoskeletal:      Right lower leg: No edema  Left lower leg: No edema  Skin:     Coloration: Skin is pale  Neurological:      Mental Status: He is oriented to person, place, and time  Psychiatric:         Mood and Affect: Mood normal           Discussion with Family: Updated  (son) via phone  Discharge instructions/Information to patient and family:   See after visit summary for information provided to patient and family  Provisions for Follow-Up Care:  See after visit summary for information related to follow-up care and any pertinent home health orders  Disposition:   Home    Planned Readmission: no     Discharge Statement:  I spent 34 minutes discharging the patient  This time was spent on the day of discharge  I had direct contact with the patient on the day of discharge  Greater than 50% of the total time was spent examining patient, answering all patient questions, arranging and discussing plan of care with patient as well as directly providing post-discharge instructions  Additional time then spent on discharge activities  Discharge Medications:  See after visit summary for reconciled discharge medications provided to patient and/or family        **Please Note: This note may have been constructed using a voice recognition system**

## 2022-06-12 NOTE — ASSESSMENT & PLAN NOTE
Lab Results   Component Value Date    EGFR 58 06/12/2022    EGFR 65 06/11/2022    EGFR 55 06/10/2022    CREATININE 1 21 06/12/2022    CREATININE 1 11 06/11/2022    CREATININE 1 26 06/10/2022   Baseline appears around 1 2-1 4; creat stable at baseline for now  · Avoid hypotension  · Continues on home dose lasix 40 mg daily  · monitor BMP

## 2022-06-12 NOTE — ASSESSMENT & PLAN NOTE
Lab Results   Component Value Date    HGBA1C 9 2 (H) 04/22/2022       Recent Labs     06/11/22  1556 06/11/22  1726 06/11/22  2119 06/12/22  0823   POCGLU 103 106 231* 216*       Blood Sugar Average: Last 72 hrs:  (P) 203  125   · A1C above goal, glucose markedly elevated 2/2 IV steroids that he received on admission  · Patient was placed on insulin gtt--steroids stopped on 6/10      · Appreciate endocrinology consultation in order to transition from insulin drip back to concentrated insulin regimen which was done 6/11  · Home regimen: concentrated insulin 45 units TIDWM

## 2022-06-12 NOTE — ED ATTENDING ATTESTATION
6/9/2022  ILudy DO, saw and evaluated the patient  I have discussed the patient with the resident/non-physician practitioner and agree with the resident's/non-physician practitioner's findings, Plan of Care, and MDM as documented in the resident's/non-physician practitioner's note, except where noted  All available labs and Radiology studies were reviewed  I was present for key portions of any procedure(s) performed by the resident/non-physician practitioner and I was immediately available to provide assistance  At this point I agree with the current assessment done in the Emergency Department  I have conducted an independent evaluation of this patient a history and physical is as follows:    60-year-old male presents for dyspnea  The patient recently admitted for heart failure exacerbation a few weeks ago  Also has a history of COPD  reports that he has had 24 to 48 hours of dyspnea  Feels like COPD  Also associated chest pain it does not radiate and is not exertional   Nonproductive cough  No fever no chills  No other associated symptoms modifying factors exam reveals mild wheezing  No lower extremity edema normal vitals    Plan breathing treatments, steroids, chest x-ray rule pulmonary edema may need admission to the    ED Course         Critical Care Time  Procedures

## 2022-06-13 ENCOUNTER — TRANSITIONAL CARE MANAGEMENT (OUTPATIENT)
Dept: FAMILY MEDICINE CLINIC | Facility: CLINIC | Age: 77
End: 2022-06-13

## 2022-06-13 ENCOUNTER — TELEPHONE (OUTPATIENT)
Dept: FAMILY MEDICINE CLINIC | Facility: CLINIC | Age: 77
End: 2022-06-13

## 2022-06-13 DIAGNOSIS — I50.33 ACUTE ON CHRONIC DIASTOLIC (CONGESTIVE) HEART FAILURE (HCC): ICD-10-CM

## 2022-06-13 DIAGNOSIS — Z71.89 COMPLEX CARE COORDINATION: Primary | ICD-10-CM

## 2022-06-13 DIAGNOSIS — I25.10 CORONARY ARTERY DISEASE INVOLVING NATIVE CORONARY ARTERY OF NATIVE HEART WITHOUT ANGINA PECTORIS: ICD-10-CM

## 2022-06-13 DIAGNOSIS — I10 ESSENTIAL HYPERTENSION: ICD-10-CM

## 2022-06-13 LAB
BACTERIA SPT RESP CULT: ABNORMAL
GRAM STN SPEC: ABNORMAL

## 2022-06-13 RX ORDER — FUROSEMIDE 40 MG/1
40 TABLET ORAL DAILY
Qty: 90 TABLET | Refills: 0 | Status: SHIPPED | OUTPATIENT
Start: 2022-06-13

## 2022-06-13 RX ORDER — LISINOPRIL 10 MG/1
10 TABLET ORAL DAILY
Qty: 90 TABLET | Refills: 0 | Status: SHIPPED | OUTPATIENT
Start: 2022-06-13

## 2022-06-13 RX ORDER — CARVEDILOL 6.25 MG/1
6.25 TABLET ORAL 2 TIMES DAILY WITH MEALS
Qty: 180 TABLET | Refills: 0 | Status: SHIPPED | OUTPATIENT
Start: 2022-06-13

## 2022-06-13 NOTE — TELEPHONE ENCOUNTER
I didn't cancel any of his meds  Call pharmacy to find out what is going on  Why did they tell him that?

## 2022-06-13 NOTE — TELEPHONE ENCOUNTER
I checked his med list and all were cancelled by hospital doctor NOT me   But ok for refills of all 3

## 2022-06-13 NOTE — TELEPHONE ENCOUNTER
Maria C Calvo called & said he was just released from the hospital, was given a list of his meds, BUT pharmacy told him Dr MARIN cancelled them all??   Uses [facility]

## 2022-06-13 NOTE — TELEPHONE ENCOUNTER
Called the pharmacy- they said we canceled carvedilol, lisinopril and furosemide  Shes not sure if it was a duplicate and we tried to get it out of the computer at one point but it was connected to the pharmacy and whatever we did canceled it    If he does need to be on these medications, [facility] will [facility]

## 2022-06-15 ENCOUNTER — APPOINTMENT (EMERGENCY)
Dept: RADIOLOGY | Facility: HOSPITAL | Age: 77
DRG: 728 | End: 2022-06-15
Payer: COMMERCIAL

## 2022-06-15 ENCOUNTER — HOSPITAL ENCOUNTER (INPATIENT)
Facility: HOSPITAL | Age: 77
LOS: 4 days | Discharge: HOME HEALTH | DRG: 728 | End: 2022-06-19
Attending: EMERGENCY MEDICINE | Admitting: INTERNAL MEDICINE
Payer: COMMERCIAL

## 2022-06-15 DIAGNOSIS — N45.1 EPIDIDYMITIS: ICD-10-CM

## 2022-06-15 DIAGNOSIS — J96.11 CHRONIC RESPIRATORY FAILURE WITH HYPOXIA (HCC): ICD-10-CM

## 2022-06-15 DIAGNOSIS — N45.1 EPIDIDYMITIS, BILATERAL: ICD-10-CM

## 2022-06-15 DIAGNOSIS — N45.2 ORCHITIS: ICD-10-CM

## 2022-06-15 DIAGNOSIS — Z79.4 TYPE 2 DIABETES MELLITUS WITH HYPERGLYCEMIA, WITH LONG-TERM CURRENT USE OF INSULIN (HCC): ICD-10-CM

## 2022-06-15 DIAGNOSIS — L03.311 CELLULITIS OF ABDOMINAL WALL: ICD-10-CM

## 2022-06-15 DIAGNOSIS — N39.0 UTI (URINARY TRACT INFECTION): Primary | ICD-10-CM

## 2022-06-15 DIAGNOSIS — M54.50 CHRONIC BILATERAL LOW BACK PAIN WITHOUT SCIATICA: ICD-10-CM

## 2022-06-15 DIAGNOSIS — N30.00 ACUTE CYSTITIS WITHOUT HEMATURIA: ICD-10-CM

## 2022-06-15 DIAGNOSIS — E11.65 TYPE 2 DIABETES MELLITUS WITH HYPERGLYCEMIA, WITH LONG-TERM CURRENT USE OF INSULIN (HCC): ICD-10-CM

## 2022-06-15 DIAGNOSIS — G89.29 CHRONIC BILATERAL LOW BACK PAIN WITHOUT SCIATICA: ICD-10-CM

## 2022-06-15 DIAGNOSIS — J44.9 COPD, SEVERE (HCC): ICD-10-CM

## 2022-06-15 LAB
ALBUMIN SERPL BCP-MCNC: 2.6 G/DL (ref 3.5–5)
ALP SERPL-CCNC: 103 U/L (ref 46–116)
ALT SERPL W P-5'-P-CCNC: 15 U/L (ref 12–78)
ANION GAP SERPL CALCULATED.3IONS-SCNC: 1 MMOL/L (ref 4–13)
AST SERPL W P-5'-P-CCNC: 12 U/L (ref 5–45)
BACTERIA UR QL AUTO: ABNORMAL /HPF
BASOPHILS # BLD AUTO: 0.03 THOUSANDS/ΜL (ref 0–0.1)
BASOPHILS NFR BLD AUTO: 0 % (ref 0–1)
BILIRUB SERPL-MCNC: 0.33 MG/DL (ref 0.2–1)
BILIRUB UR QL STRIP: NEGATIVE
BUN SERPL-MCNC: 37 MG/DL (ref 5–25)
CALCIUM ALBUM COR SERPL-MCNC: 9.8 MG/DL (ref 8.3–10.1)
CALCIUM SERPL-MCNC: 8.7 MG/DL (ref 8.3–10.1)
CHLORIDE SERPL-SCNC: 103 MMOL/L (ref 100–108)
CLARITY UR: ABNORMAL
CO2 SERPL-SCNC: 31 MMOL/L (ref 21–32)
COLOR UR: ABNORMAL
CREAT SERPL-MCNC: 1.54 MG/DL (ref 0.6–1.3)
EOSINOPHIL # BLD AUTO: 0.2 THOUSAND/ΜL (ref 0–0.61)
EOSINOPHIL NFR BLD AUTO: 2 % (ref 0–6)
ERYTHROCYTE [DISTWIDTH] IN BLOOD BY AUTOMATED COUNT: 15.4 % (ref 11.6–15.1)
GFR SERPL CREATININE-BSD FRML MDRD: 43 ML/MIN/1.73SQ M
GLUCOSE SERPL-MCNC: 203 MG/DL (ref 65–140)
GLUCOSE SERPL-MCNC: 374 MG/DL (ref 65–140)
GLUCOSE SERPL-MCNC: 398 MG/DL (ref 65–140)
GLUCOSE SERPL-MCNC: 464 MG/DL (ref 65–140)
GLUCOSE UR STRIP-MCNC: ABNORMAL MG/DL
HCT VFR BLD AUTO: 39.9 % (ref 36.5–49.3)
HGB BLD-MCNC: 13.1 G/DL (ref 12–17)
HGB UR QL STRIP.AUTO: ABNORMAL
IMM GRANULOCYTES # BLD AUTO: 0.06 THOUSAND/UL (ref 0–0.2)
IMM GRANULOCYTES NFR BLD AUTO: 1 % (ref 0–2)
KETONES UR STRIP-MCNC: NEGATIVE MG/DL
LACTATE SERPL-SCNC: 1.4 MMOL/L (ref 0.5–2)
LEUKOCYTE ESTERASE UR QL STRIP: ABNORMAL
LIPASE SERPL-CCNC: 147 U/L (ref 73–393)
LYMPHOCYTES # BLD AUTO: 1.36 THOUSANDS/ΜL (ref 0.6–4.47)
LYMPHOCYTES NFR BLD AUTO: 11 % (ref 14–44)
MCH RBC QN AUTO: 25.7 PG (ref 26.8–34.3)
MCHC RBC AUTO-ENTMCNC: 32.8 G/DL (ref 31.4–37.4)
MCV RBC AUTO: 78 FL (ref 82–98)
MONOCYTES # BLD AUTO: 1.21 THOUSAND/ΜL (ref 0.17–1.22)
MONOCYTES NFR BLD AUTO: 10 % (ref 4–12)
NEUTROPHILS # BLD AUTO: 9.92 THOUSANDS/ΜL (ref 1.85–7.62)
NEUTS SEG NFR BLD AUTO: 76 % (ref 43–75)
NITRITE UR QL STRIP: POSITIVE
NON-SQ EPI CELLS URNS QL MICRO: ABNORMAL /HPF
NRBC BLD AUTO-RTO: 0 /100 WBCS
PH UR STRIP.AUTO: 5.5 [PH]
PLATELET # BLD AUTO: 224 THOUSANDS/UL (ref 149–390)
PMV BLD AUTO: 10.3 FL (ref 8.9–12.7)
POTASSIUM SERPL-SCNC: 4.5 MMOL/L (ref 3.5–5.3)
PROT SERPL-MCNC: 7.9 G/DL (ref 6.4–8.2)
PROT UR STRIP-MCNC: ABNORMAL MG/DL
RBC # BLD AUTO: 5.09 MILLION/UL (ref 3.88–5.62)
RBC #/AREA URNS AUTO: ABNORMAL /HPF
SODIUM SERPL-SCNC: 135 MMOL/L (ref 136–145)
SP GR UR STRIP.AUTO: 1.01 (ref 1–1.03)
UROBILINOGEN UR STRIP-ACNC: <2 MG/DL
WBC # BLD AUTO: 12.78 THOUSAND/UL (ref 4.31–10.16)
WBC #/AREA URNS AUTO: ABNORMAL /HPF

## 2022-06-15 PROCEDURE — 94640 AIRWAY INHALATION TREATMENT: CPT

## 2022-06-15 PROCEDURE — 99285 EMERGENCY DEPT VISIT HI MDM: CPT | Performed by: EMERGENCY MEDICINE

## 2022-06-15 PROCEDURE — 87086 URINE CULTURE/COLONY COUNT: CPT | Performed by: EMERGENCY MEDICINE

## 2022-06-15 PROCEDURE — 87186 SC STD MICRODIL/AGAR DIL: CPT | Performed by: EMERGENCY MEDICINE

## 2022-06-15 PROCEDURE — 99285 EMERGENCY DEPT VISIT HI MDM: CPT

## 2022-06-15 PROCEDURE — 99222 1ST HOSP IP/OBS MODERATE 55: CPT | Performed by: PHYSICIAN ASSISTANT

## 2022-06-15 PROCEDURE — 99223 1ST HOSP IP/OBS HIGH 75: CPT | Performed by: INTERNAL MEDICINE

## 2022-06-15 PROCEDURE — 81001 URINALYSIS AUTO W/SCOPE: CPT | Performed by: EMERGENCY MEDICINE

## 2022-06-15 PROCEDURE — 83690 ASSAY OF LIPASE: CPT | Performed by: EMERGENCY MEDICINE

## 2022-06-15 PROCEDURE — 96365 THER/PROPH/DIAG IV INF INIT: CPT

## 2022-06-15 PROCEDURE — 80053 COMPREHEN METABOLIC PANEL: CPT | Performed by: EMERGENCY MEDICINE

## 2022-06-15 PROCEDURE — 82948 REAGENT STRIP/BLOOD GLUCOSE: CPT

## 2022-06-15 PROCEDURE — 36415 COLL VENOUS BLD VENIPUNCTURE: CPT | Performed by: EMERGENCY MEDICINE

## 2022-06-15 PROCEDURE — 85025 COMPLETE CBC W/AUTO DIFF WBC: CPT | Performed by: EMERGENCY MEDICINE

## 2022-06-15 PROCEDURE — 74176 CT ABD & PELVIS W/O CONTRAST: CPT

## 2022-06-15 PROCEDURE — G1004 CDSM NDSC: HCPCS

## 2022-06-15 PROCEDURE — 96375 TX/PRO/DX INJ NEW DRUG ADDON: CPT

## 2022-06-15 PROCEDURE — 94760 N-INVAS EAR/PLS OXIMETRY 1: CPT

## 2022-06-15 PROCEDURE — 87077 CULTURE AEROBIC IDENTIFY: CPT | Performed by: EMERGENCY MEDICINE

## 2022-06-15 PROCEDURE — 76870 US EXAM SCROTUM: CPT

## 2022-06-15 PROCEDURE — 83605 ASSAY OF LACTIC ACID: CPT | Performed by: EMERGENCY MEDICINE

## 2022-06-15 RX ORDER — TAMSULOSIN HYDROCHLORIDE 0.4 MG/1
0.4 CAPSULE ORAL
Status: DISCONTINUED | OUTPATIENT
Start: 2022-06-15 | End: 2022-06-19 | Stop reason: HOSPADM

## 2022-06-15 RX ORDER — INSULIN LISPRO 100 [IU]/ML
1-5 INJECTION, SOLUTION INTRAVENOUS; SUBCUTANEOUS
Status: DISCONTINUED | OUTPATIENT
Start: 2022-06-15 | End: 2022-06-16

## 2022-06-15 RX ORDER — POTASSIUM CHLORIDE 20 MEQ/1
20 TABLET, EXTENDED RELEASE ORAL DAILY
Status: DISCONTINUED | OUTPATIENT
Start: 2022-06-15 | End: 2022-06-19 | Stop reason: HOSPADM

## 2022-06-15 RX ORDER — FENTANYL CITRATE 50 UG/ML
50 INJECTION, SOLUTION INTRAMUSCULAR; INTRAVENOUS ONCE
Status: COMPLETED | OUTPATIENT
Start: 2022-06-15 | End: 2022-06-15

## 2022-06-15 RX ORDER — CYCLOBENZAPRINE HCL 10 MG
10 TABLET ORAL 2 TIMES DAILY
Status: DISCONTINUED | OUTPATIENT
Start: 2022-06-15 | End: 2022-06-19 | Stop reason: HOSPADM

## 2022-06-15 RX ORDER — ASPIRIN 81 MG/1
81 TABLET ORAL DAILY
Status: DISCONTINUED | OUTPATIENT
Start: 2022-06-15 | End: 2022-06-19 | Stop reason: HOSPADM

## 2022-06-15 RX ORDER — ACETAMINOPHEN 325 MG/1
975 TABLET ORAL EVERY 8 HOURS SCHEDULED
Status: DISCONTINUED | OUTPATIENT
Start: 2022-06-15 | End: 2022-06-19 | Stop reason: HOSPADM

## 2022-06-15 RX ORDER — LEVALBUTEROL 1.25 MG/.5ML
1.25 SOLUTION, CONCENTRATE RESPIRATORY (INHALATION)
Status: DISCONTINUED | OUTPATIENT
Start: 2022-06-15 | End: 2022-06-19 | Stop reason: HOSPADM

## 2022-06-15 RX ORDER — LISINOPRIL 10 MG/1
10 TABLET ORAL DAILY
Status: DISCONTINUED | OUTPATIENT
Start: 2022-06-15 | End: 2022-06-19 | Stop reason: HOSPADM

## 2022-06-15 RX ORDER — HEPARIN SODIUM 5000 [USP'U]/ML
5000 INJECTION, SOLUTION INTRAVENOUS; SUBCUTANEOUS EVERY 8 HOURS SCHEDULED
Status: DISCONTINUED | OUTPATIENT
Start: 2022-06-15 | End: 2022-06-19 | Stop reason: HOSPADM

## 2022-06-15 RX ORDER — FLUTICASONE FUROATE AND VILANTEROL 200; 25 UG/1; UG/1
1 POWDER RESPIRATORY (INHALATION) DAILY
Status: DISCONTINUED | OUTPATIENT
Start: 2022-06-15 | End: 2022-06-19 | Stop reason: HOSPADM

## 2022-06-15 RX ORDER — CARVEDILOL 6.25 MG/1
6.25 TABLET ORAL 2 TIMES DAILY WITH MEALS
Status: DISCONTINUED | OUTPATIENT
Start: 2022-06-15 | End: 2022-06-19 | Stop reason: HOSPADM

## 2022-06-15 RX ORDER — FERROUS SULFATE 325(65) MG
325 TABLET ORAL
Status: DISCONTINUED | OUTPATIENT
Start: 2022-06-16 | End: 2022-06-19 | Stop reason: HOSPADM

## 2022-06-15 RX ORDER — ALBUTEROL SULFATE 2.5 MG/3ML
2.5 SOLUTION RESPIRATORY (INHALATION) EVERY 4 HOURS PRN
Status: DISCONTINUED | OUTPATIENT
Start: 2022-06-15 | End: 2022-06-19 | Stop reason: HOSPADM

## 2022-06-15 RX ORDER — ONDANSETRON 2 MG/ML
4 INJECTION INTRAMUSCULAR; INTRAVENOUS EVERY 6 HOURS PRN
Status: DISCONTINUED | OUTPATIENT
Start: 2022-06-15 | End: 2022-06-19 | Stop reason: HOSPADM

## 2022-06-15 RX ORDER — FUROSEMIDE 40 MG/1
40 TABLET ORAL DAILY
Status: DISCONTINUED | OUTPATIENT
Start: 2022-06-15 | End: 2022-06-19 | Stop reason: HOSPADM

## 2022-06-15 RX ORDER — OXYCODONE HYDROCHLORIDE 5 MG/1
5 TABLET ORAL EVERY 4 HOURS PRN
Status: DISCONTINUED | OUTPATIENT
Start: 2022-06-15 | End: 2022-06-19 | Stop reason: HOSPADM

## 2022-06-15 RX ORDER — PRAVASTATIN SODIUM 80 MG/1
80 TABLET ORAL
Status: DISCONTINUED | OUTPATIENT
Start: 2022-06-15 | End: 2022-06-19 | Stop reason: HOSPADM

## 2022-06-15 RX ORDER — IPRATROPIUM BROMIDE AND ALBUTEROL SULFATE 2.5; .5 MG/3ML; MG/3ML
3 SOLUTION RESPIRATORY (INHALATION) 3 TIMES DAILY
Status: DISCONTINUED | OUTPATIENT
Start: 2022-06-15 | End: 2022-06-15

## 2022-06-15 RX ADMIN — ACETAMINOPHEN 975 MG: 325 TABLET, FILM COATED ORAL at 16:48

## 2022-06-15 RX ADMIN — FUROSEMIDE 40 MG: 40 TABLET ORAL at 16:49

## 2022-06-15 RX ADMIN — HEPARIN SODIUM 5000 UNITS: 5000 INJECTION INTRAVENOUS; SUBCUTANEOUS at 16:48

## 2022-06-15 RX ADMIN — ASPIRIN 81 MG: 81 TABLET, COATED ORAL at 16:49

## 2022-06-15 RX ADMIN — IPRATROPIUM BROMIDE 0.5 MG: 0.5 SOLUTION RESPIRATORY (INHALATION) at 20:30

## 2022-06-15 RX ADMIN — CYCLOBENZAPRINE HYDROCHLORIDE 10 MG: 10 TABLET, FILM COATED ORAL at 22:36

## 2022-06-15 RX ADMIN — INSULIN LISPRO 5 UNITS: 100 INJECTION, SOLUTION INTRAVENOUS; SUBCUTANEOUS at 16:56

## 2022-06-15 RX ADMIN — ACETAMINOPHEN 975 MG: 325 TABLET, FILM COATED ORAL at 22:36

## 2022-06-15 RX ADMIN — TAMSULOSIN HYDROCHLORIDE 0.4 MG: 0.4 CAPSULE ORAL at 16:49

## 2022-06-15 RX ADMIN — FENTANYL CITRATE 50 MCG: 50 INJECTION INTRAMUSCULAR; INTRAVENOUS at 05:30

## 2022-06-15 RX ADMIN — LEVALBUTEROL HYDROCHLORIDE 1.25 MG: 1.25 SOLUTION, CONCENTRATE RESPIRATORY (INHALATION) at 20:30

## 2022-06-15 RX ADMIN — HEPARIN SODIUM 5000 UNITS: 5000 INJECTION INTRAVENOUS; SUBCUTANEOUS at 22:36

## 2022-06-15 RX ADMIN — FLUTICASONE FUROATE AND VILANTEROL TRIFENATATE 1 PUFF: 200; 25 POWDER RESPIRATORY (INHALATION) at 16:47

## 2022-06-15 RX ADMIN — INSULIN LISPRO 3 UNITS: 100 INJECTION, SOLUTION INTRAVENOUS; SUBCUTANEOUS at 22:36

## 2022-06-15 RX ADMIN — LISINOPRIL 10 MG: 10 TABLET ORAL at 16:49

## 2022-06-15 RX ADMIN — INSULIN HUMAN 45 UNITS: 500 INJECTION, SOLUTION SUBCUTANEOUS at 16:47

## 2022-06-15 RX ADMIN — POTASSIUM CHLORIDE 20 MEQ: 1500 TABLET, EXTENDED RELEASE ORAL at 16:49

## 2022-06-15 RX ADMIN — INSULIN LISPRO 5 UNITS: 100 INJECTION, SOLUTION INTRAVENOUS; SUBCUTANEOUS at 14:22

## 2022-06-15 RX ADMIN — PRAVASTATIN SODIUM 80 MG: 80 TABLET ORAL at 16:48

## 2022-06-15 RX ADMIN — CEFTRIAXONE 2000 MG: 2 INJECTION, POWDER, FOR SOLUTION INTRAMUSCULAR; INTRAVENOUS at 09:58

## 2022-06-15 RX ADMIN — CARVEDILOL 6.25 MG: 6.25 TABLET, FILM COATED ORAL at 16:49

## 2022-06-15 NOTE — ASSESSMENT & PLAN NOTE
Lab Results   Component Value Date    EGFR 43 06/15/2022    EGFR 58 06/12/2022    EGFR 65 06/11/2022    CREATININE 1 54 (H) 06/15/2022    CREATININE 1 21 06/12/2022    CREATININE 1 11 06/11/2022   Baseline appears around 1 2-1 4  Monitor

## 2022-06-15 NOTE — PLAN OF CARE
Problem: PAIN - ADULT  Goal: Verbalizes/displays adequate comfort level or baseline comfort level  Description: Interventions:  - Encourage patient to monitor pain and request assistance  - Assess pain using appropriate pain scale  - Administer analgesics based on type and severity of pain and evaluate response  - Implement non-pharmacological measures as appropriate and evaluate response  - Consider cultural and social influences on pain and pain management  - Notify physician/advanced practitioner if interventions unsuccessful or patient reports new pain  Outcome: Progressing     Problem: INFECTION - ADULT  Goal: Absence or prevention of progression during hospitalization  Description: INTERVENTIONS:  - Assess and monitor for signs and symptoms of infection  - Monitor lab/diagnostic results  - Monitor all insertion sites, i e  indwelling lines, tubes, and drains  - Monitor endotracheal if appropriate and nasal secretions for changes in amount and color  - Lenore appropriate cooling/warming therapies per order  - Administer medications as ordered  - Instruct and encourage patient and family to use good hand hygiene technique  - Identify and instruct in appropriate isolation precautions for identified infection/condition  Outcome: Progressing

## 2022-06-15 NOTE — ASSESSMENT & PLAN NOTE
Lab Results   Component Value Date    HGBA1C 9 2 (H) 04/22/2022       No results for input(s): POCGLU in the last 72 hours      Blood Sugar Average: Last 72 hrs:     Continue home dose insulin  Monitor blood sugar

## 2022-06-15 NOTE — ED ATTENDING ATTESTATION
6/15/2022  ILola MD, saw and evaluated the patient  I have discussed the patient with the resident/non-physician practitioner and agree with the resident's/non-physician practitioner's findings, Plan of Care, and MDM as documented in the resident's/non-physician practitioner's note, except where noted  All available labs and Radiology studies were reviewed  I was present for key portions of any procedure(s) performed by the resident/non-physician practitioner and I was immediately available to provide assistance  At this point I agree with the current assessment done in the Emergency Department  I have conducted an independent evaluation of this patient a history and physical is as follows:    ED Course     Emergency Department Note- Lesvia Adam Sr  78 y o  male MRN: [id number]    Unit/Bed#: ED 08 Encounter: [id number]    Mansi Earl is a 78 y o  male who presents with   Chief Complaint   Patient presents with    Medical Problem     Pt states his scrotum is swollen for the past few days         History of Present Illness   HPI:  Mansi Earl  is a 78 y o  male who presents for evaluation of:  Progressive pain and swelling around his scrotum and suprapubic area over the last week and worse over the last several days  Patient was recently admitted to the hospital and discharged 2 days ago after admission for dyspnea  Patient notes that he has had progressive swelling associated with progressively worse discomfort over the last week and more so over the last 2 days  He denies any associated dysuria  Review of Systems   Constitutional: Negative for chills and fever  HENT: Negative for congestion and rhinorrhea  Respiratory: Negative for cough and shortness of breath  Cardiovascular: Negative for chest pain and palpitations  Gastrointestinal: Negative for nausea and vomiting  Genitourinary: Positive for difficulty urinating  Negative for dysuria and flank pain  All other systems reviewed and are negative  Historical Information   Past Medical History:   Diagnosis Date    Abdominal pain     [facility]    Cardiac disease     CHF (congestive heart failure) (HCC)     COPD, severe (HCC)     Coronary artery disease     DDD (degenerative disc disease), lumbar 2020    Diabetes mellitus ([facility] )     Dyspnea     GERD (gastroesophageal reflux disease)     Hyperlipidemia     Hypertension     MI (myocardial infarction) ([facility] )     with 3 stents    Nodule of apex of right lung     JACQUELINE (obstructive sleep apnea)     Prostate cancer [facility])      Past Surgical History:   Procedure Laterality Date    ABDOMINAL SURGERY      exploratory    ANGIOPLASTY      3 stents    APPENDECTOMY      COLONOSCOPY      CT NEEDLE BIOPSY LUNG  11/3/2020    ESOPHAGOGASTRODUODENOSCOPY N/A 10/2/2017    Procedure: ESOPHAGOGASTRODUODENOSCOPY (EGD); Surgeon: Jana Ramírez MD;  Location: [facility]; Service: Gastroenterology    IR THORACENTESIS  11/3/2020    KNEE CARTILAGE SURGERY      OTHER SURGICAL HISTORY      stent placement    PROSTATE SURGERY      SKIN GRAFT      Basal cell CA back     Social History   Social History     Substance and Sexual Activity   Alcohol Use Never     Social History     Substance and Sexual Activity   Drug Use No     Social History     Tobacco Use   Smoking Status Former Smoker    Packs/day: 1 50    Years: 50 00    Pack years: 75 00    Start date: 38    Quit date: 2020    Years since quittin 9   Smokeless Tobacco Never Used     Family History:   Family History   Problem Relation Age of Onset    Heart disease Father     Other Father         Mesothelioma        Meds/Allergies   PTA meds:   Prior to Admission Medications   Prescriptions Last Dose Informant Patient Reported? Taking?    BD Insulin Syringe U-500 31G X 6MM 0 5 ML MISC   No No   Sig: USE 1 SYRINGE THREE TIMES A DAY FOR INJECTING INSULIN   aspirin (ECOTRIN LOW STRENGTH) 81 mg EC tablet   No No   Sig: Take 1 tablet (81 mg total) by mouth daily   carvedilol (COREG) 6 25 mg tablet   No No   Sig: Take 1 tablet (6 25 mg total) by mouth 2 (two) times a day with meals   celecoxib (CeleBREX) 200 mg capsule   No No   Sig: Take 1 capsule (200 mg total) by mouth in the morning  cyclobenzaprine (FLEXERIL) 10 mg tablet   No No   Sig: Take 1 tablet (10 mg total) by mouth in the morning and 1 tablet (10 mg total) before bedtime  ferrous sulfate 325 (65 Fe) mg tablet   No No   Sig: Take 1 tablet (325 mg total) by mouth daily with breakfast   fluticasone-umeclidinium-vilanterol (Trelegy Ellipta) 100-62 5-25 MCG/INH inhaler   No No   Sig: Inhale 1 puff  in the morning  Rinse mouth after use      furosemide (LASIX) 40 mg tablet   No No   Sig: Take 1 tablet (40 mg total) by mouth daily   glucose blood (OneTouch Verio) test strip   No No   Sig: May substitute brand based on insurance coverage  Check glucose QID  insulin regular (HUMULIN R) 500 units/mL CONCENTRATED injection   No No   Sig: Inject 0 09 mL (45 Units total) under the skin in the morning and 0 09 mL (45 Units total) at noon and 0 09 mL (45 Units total) in the evening  Inject before meals  ipratropium-albuterol (DUO-NEB) 0 5-2 5 mg/3 mL nebulizer solution   No No   Sig: Take 3 mL by nebulization 3 (three) times a day   Patient not taking: No sig reported   lisinopril (ZESTRIL) 10 mg tablet   No No   Sig: Take 1 tablet (10 mg total) by mouth daily   potassium chloride (K-DUR,KLOR-CON) 20 mEq tablet   No No   Sig: Take 1 tablet (20 mEq total) by mouth in the morning  simvastatin (ZOCOR) 40 mg tablet   No No   Sig: Take 1 tablet (40 mg total) by mouth in the morning        Facility-Administered Medications: None     Allergies   Allergen Reactions    Crestor [Rosuvastatin] Other (See Comments)     Unknown      Metformin GI Intolerance       Objective   First Vitals:   Blood Pressure: 149/67 (06/15/22 6620)  Pulse: 55 (06/15/22 0508)  Temperature: 97 7 °F (36 5 °C) (06/15/22 0508)  Temp Source: Oral (06/15/22 0508)  Respirations: 18 (06/15/22 0508)  SpO2: 96 % (06/15/22 0508)    Current Vitals:   Blood Pressure: 149/67 (06/15/22 0508)  Pulse: 55 (06/15/22 0508)  Temperature: 97 7 °F (36 5 °C) (06/15/22 0508)  Temp Source: Oral (06/15/22 0508)  Respirations: 18 (06/15/22 0508)  SpO2: 96 % (06/15/22 0508)    No intake or output data in the 24 hours ending 06/15/22 0639    Invasive Devices  Report    Peripheral Intravenous Line  Duration           Peripheral IV 06/15/22 Right Forearm <1 day                Physical Exam  Vitals and nursing note reviewed  Constitutional:       General: He is not in acute distress  Appearance: Normal appearance  He is well-developed  HENT:      Head: Normocephalic and atraumatic  Right Ear: External ear normal       Left Ear: External ear normal       Nose: Nose normal       Mouth/Throat:      Pharynx: No oropharyngeal exudate  Eyes:      Conjunctiva/sclera: Conjunctivae normal       Pupils: Pupils are equal, round, and reactive to light  Cardiovascular:      Rate and Rhythm: Normal rate and regular rhythm  Pulmonary:      Effort: Pulmonary effort is normal  No respiratory distress  Abdominal:      General: Abdomen is flat  There is no distension  Palpations: Abdomen is soft  Genitourinary:      Musculoskeletal:         General: No deformity  Normal range of motion  Cervical back: Normal range of motion and neck supple  Skin:     General: Skin is warm and dry  Capillary Refill: Capillary refill takes less than 2 seconds  Neurological:      General: No focal deficit present  Mental Status: He is alert and oriented to person, place, and time  Mental status is at baseline  Coordination: Coordination normal    Psychiatric:         Mood and Affect: Mood normal          Behavior: Behavior normal          Thought Content:  Thought content normal          Judgment: Judgment normal            Medical Decision Makin   Acute scrotal and penile pain: CTAP; CBC; lactate; CMP; pain control    Recent Results (from the past 36 hour(s))   CBC and differential    Collection Time: 06/15/22  5:30 AM   Result Value Ref Range    WBC 12 78 (H) 4 31 - 10 16 Thousand/uL    RBC 5 09 3 88 - 5 62 Million/uL    Hemoglobin 13 1 12 0 - 17 0 g/dL    Hematocrit 39 9 36 5 - 49 3 %    MCV 78 (L) 82 - 98 fL    MCH 25 7 (L) 26 8 - 34 3 pg    MCHC 32 8 31 4 - 37 4 g/dL    RDW 15 4 (H) 11 6 - 15 1 %    MPV 10 3 8 9 - 12 7 fL    Platelets 268 012 - 188 Thousands/uL    nRBC 0 /100 WBCs    Neutrophils Relative 76 (H) 43 - 75 %    Immat GRANS % 1 0 - 2 %    Lymphocytes Relative 11 (L) 14 - 44 %    Monocytes Relative 10 4 - 12 %    Eosinophils Relative 2 0 - 6 %    Basophils Relative 0 0 - 1 %    Neutrophils Absolute 9 92 (H) 1 85 - 7 62 Thousands/µL    Immature Grans Absolute 0 06 0 00 - 0 20 Thousand/uL    Lymphocytes Absolute 1 36 0 60 - 4 47 Thousands/µL    Monocytes Absolute 1 21 0 17 - 1 22 Thousand/µL    Eosinophils Absolute 0 20 0 00 - 0 61 Thousand/µL    Basophils Absolute 0 03 0 00 - 0 10 Thousands/µL   Comprehensive metabolic panel    Collection Time: 06/15/22  5:30 AM   Result Value Ref Range    Sodium 135 (L) 136 - 145 mmol/L    Potassium 4 5 3 5 - 5 3 mmol/L    Chloride 103 100 - 108 mmol/L    CO2 31 21 - 32 mmol/L    ANION GAP 1 (L) 4 - 13 mmol/L    BUN 37 (H) 5 - 25 mg/dL    Creatinine 1 54 (H) 0 60 - 1 30 mg/dL    Glucose 203 (H) 65 - 140 mg/dL    Calcium 8 7 8 3 - 10 1 mg/dL    Corrected Calcium 9 8 8 3 - 10 1 mg/dL    AST 12 5 - 45 U/L    ALT 15 12 - 78 U/L    Alkaline Phosphatase 103 46 - 116 U/L    Total Protein 7 9 6 4 - 8 2 g/dL    Albumin 2 6 (L) 3 5 - 5 0 g/dL    Total Bilirubin 0 33 0 20 - 1 00 mg/dL    eGFR 43 ml/min/1 73sq m   Lipase    Collection Time: 06/15/22  5:30 AM   Result Value Ref Range    Lipase 147 73 - 393 u/L     CT abdomen pelvis wo contrast    (Results Pending)         Portions of the record may have been created with voice recognition software  Occasional wrong word or "sound a like" substitutions may have occurred due to the inherent limitations of voice recognition software  Read the chart carefully and recognize, using context, where substitutions have occurred          Critical Care Time  Procedures

## 2022-06-15 NOTE — CONSULTS
Consult - Urology   Brice Galvan   1945, 78 y o  male MRN: [id number]    Unit/Bed#: ED 08 Encounter: [id number]      * Acute cystitis without hematuria  Assessment & Plan  Assessment  BPH  UTI  Epididymorchitis L>R secondary to above  Prostate cancer s/p brachytherapy and EBRT around 2010    Plan  CT a/p and scrotal US reviewed  Bladder scan/PVR to ensure adequate emptying  Daily scrotal exams  Ceftriaxone, urine c&s sent/pending  Analgesia prn  Scrotal elevation/support/ice for comfort  Flomax 0 4mg qhs restarted          Subjective: scrotal pain and swelling 2 days L>R  Also slower flow  No hematuria or dysuria  No fevers  Was just discharged last week from inpatient stay for exacerbation chronic respiratory failure  It does not appear he had any urinary catheterization during that stay  He did receive steroids and lasix  He does have prostate cancer treated with brachyseed implantation and external beam boost radiation about 10 years ago in [address] [place] [address] with rad-onc (his urologist is dr Mikayla Cuevas), pt rreports undetectable PSA since then and done very well except for urgency at times  Last PSA I can find is 2018  Was on flomax few years ago but not recently, helped him in the past he is not sure why he stopped it  Review of Systems   Constitutional: Negative for activity change, appetite change, chills, fever and unexpected weight change  HENT: Negative  Respiratory: Negative  Negative for shortness of breath  Cardiovascular: Negative  Negative for chest pain  Gastrointestinal: Negative for abdominal pain, diarrhea, nausea and vomiting  Endocrine: Negative  Genitourinary: Positive for scrotal swelling, testicular pain and urgency  Negative for decreased urine volume, difficulty urinating, dysuria, flank pain, frequency, hematuria, penile pain and penile swelling  Slower flow this week   Musculoskeletal: Negative for back pain and gait problem  Skin: Negative  Allergic/Immunologic: Negative  Neurological: Negative  Hematological: Negative for adenopathy  Does not bruise/bleed easily  Objective:  Vitals: Blood pressure 132/60, pulse 92, temperature 97 7 °F (36 5 °C), temperature source Oral, resp  rate 18, SpO2 95 %  ,There is no height or weight on file to calculate BMI  Physical Exam  Vitals and nursing note reviewed  Constitutional:       Appearance: He is well-developed  HENT:      Head: Normocephalic and atraumatic  Cardiovascular:      Rate and Rhythm: Normal rate and regular rhythm  Heart sounds: Normal heart sounds  No murmur heard  Pulmonary:      Effort: Pulmonary effort is normal       Breath sounds: Normal breath sounds  Abdominal:      General: Bowel sounds are normal       Palpations: Abdomen is soft  Tenderness: There is no right CVA tenderness or left CVA tenderness  Comments: Suprapubic tattoo consistent with reported history of EBRT   Genitourinary:     Comments: Circumcised penis, normal phallus, orthotopic patent meatus  Testes descended bilaterally into the scrotum  Tender with some indurated inferior scrotal and suprapubic skin very faint erythema  Left testicle firm and tender > right  No crepitus fluctuance or significant erythema  Normal skin nontender perineum and thighs  Musculoskeletal:         General: Normal range of motion  Skin:     General: Skin is warm and dry  Capillary Refill: Capillary refill takes less than 2 seconds  Coloration: Skin is not pale  Neurological:      Mental Status: He is alert and oriented to person, place, and time  Imaging:  CT a/p  US scrotum/testes  Imaging reviewed - both report and images personally reviewed       Labs:  Recent Labs     06/15/22  0530   WBC 12 78*     Recent Labs     06/15/22  0530   HGB 13 1       Recent Labs     06/15/22  0530   CREATININE 1 54*       Microbiology:  UA +leuk +nitrite +bl  UCX sent/pending    History:  Social History Socioeconomic History    Marital status:      Spouse name: Not on file    Number of children: 1    Years of education: 8    Highest education level: Not on file   Occupational History    Not on file   Tobacco Use    Smoking status: Former Smoker     Packs/day: 1 50     Years: 50 00     Pack years: 75 00     Start date: 38     Quit date: 2020     Years since quittin 9    Smokeless tobacco: Never Used   Vaping Use    Vaping Use: Never used   Substance and Sexual Activity    Alcohol use: Never    Drug use: No    Sexual activity: Never     Partners: Female   Other Topics Concern    Not on file   Social History Narrative    Most recent tobacco use screenin2018    Do you currently or have you served in the [employer]: No    Were you activated, into active duty, as a member of the [employer] or as a Reservist: No    Caffeine intake: Moderate    Alcohol intake: None    Marital status:     Number of children: 1    Education: 8    Occupation: retired    Sexual orientation: Heterosexual    General stress level: Medium    Live alone or with others: with others    Exercise level: None    Sexually active: No    Overweight: Yes    Obese: Yes    Guns present in home: No    Seat belts used routinely: Yes    Advance directive: No    Sunscreen used routinely: No    Smoke alarm in home: Yes    Legally blind in one or both eyes: No    Hard of hearing or deaf in one or both ears: No     Social Determinants of Health     Financial Resource Strain: Not on file   Food Insecurity: No Food Insecurity    Worried About Running Out of Food in the Last Year: Never true    Kenyatta of Food in the Last Year: Never true   Transportation Needs: No Transportation Needs    Lack of Transportation (Medical): No    Lack of Transportation (Non-Medical):  No   Physical Activity: Not on file   Stress: Not on file   Social Connections: Not on file   Intimate Partner Violence: Not on file   Housing Stability: Low Risk     Unable to Pay for Housing in the Last Year: No    Number of Places Lived in the Last Year: 1    Unstable Housing in the Last Year: No     Financial Resource Strain: Not on file   Food Insecurity: No Food Insecurity    Worried About [facility] in the Last Year: Never true    Kenyatta of Food in the Last Year: Never true   Transportation Needs: No Transportation Needs    Lack of Transportation (Medical): No    Lack of Transportation (Non-Medical): No   Physical Activity: Not on file   Stress: Not on file   Social Connections: Not on file   Intimate Partner Violence: Not on file   Housing Stability: Low Risk     Unable to Pay for Housing in the Last Year: No    Number of Places Lived in the Last Year: 1    Unstable Housing in the Last Year: No      Diagnosis Date    Abdominal pain     MARANDA (acute kidney injury) ([place] [id number] ) 6/19/2018    Cardiac disease     [facility]     COPD, severe ([place] [address] )     Coronary artery disease     DDD (degenerative disc disease), lumbar 9/1/2020    Diabetes mellitus ([place] [address] )     Dyspnea     GERD (gastroesophageal reflux disease)     Hyperlipidemia     Hypertension     MI (myocardial infarction) ([place] [address] )     with 3 stents    Nodule of apex of right lung     JACQUELINE (obstructive sleep apnea)     Prostate cancer [facility])      Past Surgical History:   Procedure Laterality Date    ABDOMINAL SURGERY      exploratory    ANGIOPLASTY      3 stents    APPENDECTOMY      COLONOSCOPY  2015    CT NEEDLE BIOPSY LUNG  11/3/2020    ESOPHAGOGASTRODUODENOSCOPY N/A 10/2/2017    Procedure: ESOPHAGOGASTRODUODENOSCOPY (EGD); Surgeon: Erika Michele MD;  Location: [facility];   Service: Gastroenterology    IR THORACENTESIS  11/3/2020    KNEE CARTILAGE SURGERY      OTHER SURGICAL HISTORY      stent placement    PROSTATE SURGERY      SKIN GRAFT      Basal cell CA back     Family History   Problem Relation Age of Onset    Heart disease Father    Vishnu Ann Other Father         Mesothelioma        Gutierrez Chaudhary [place]  Date: 6/15/2022 Time: 1:56 PM

## 2022-06-15 NOTE — ASSESSMENT & PLAN NOTE
Assessment  BPH  UTI  Epididymorchitis L>R secondary to above  Prostate cancer s/p brachytherapy and EBRT around 2010    Plan  CT a/p and scrotal US reviewed  Bladder scan/PVR to ensure adequate emptying  Continue daily scrotal exams  Urine culture positive Pseudomonas aeruginosa    Will add Cipro  WBC 11 89 (9 56 yesterday, 12 78 on 06/15/2022)  Analgesia prn  Scrotal elevation/support/ice for comfort  Flomax 0 4mg qhs restarted

## 2022-06-15 NOTE — ED PROVIDER NOTES
History  Chief Complaint   Patient presents with    Medical Problem     Pt states his scrotum is swollen for the past few days     77-year-old male history of CHF, COPD, CAD, hypertension, obesity, recently admitted to the hospital for CHF exacerbation discharge within the last week presenting due to lower abdominal/testicular pain and swelling  Patient says over the past week he has had pain in his lower abdomen and testicle  Says it has gotten severe and it feels like someone kicked me in the nuts"  Says this pain has been persistent for days  Denies taking anything for symptoms  Some associated nausea  Patient says he has baseline shortness of breath and orthopnea as well as extremity swelling with no acute change  He denies any overlying skin changes in his lower abdomen or groin  Denies any fever chills, nausea or vomiting, dysuria or urinary incontinence retention  Prior to Admission Medications   Prescriptions Last Dose Informant Patient Reported? Taking? BD Insulin Syringe U-500 31G X 6MM 0 5 ML MISC 6/15/2022 at Unknown time  No Yes   Sig: USE 1 SYRINGE THREE TIMES A DAY FOR INJECTING INSULIN   aspirin (ECOTRIN LOW STRENGTH) 81 mg EC tablet 6/15/2022 at Unknown time  No Yes   Sig: Take 1 tablet (81 mg total) by mouth daily   carvedilol (COREG) 6 25 mg tablet 6/15/2022 at Unknown time  No Yes   Sig: Take 1 tablet (6 25 mg total) by mouth 2 (two) times a day with meals   celecoxib (CeleBREX) 200 mg capsule Unknown at Unknown time  No No   Sig: Take 1 capsule (200 mg total) by mouth in the morning  cyclobenzaprine (FLEXERIL) 10 mg tablet Unknown at Unknown time  No No   Sig: Take 1 tablet (10 mg total) by mouth in the morning and 1 tablet (10 mg total) before bedtime     ferrous sulfate 325 (65 Fe) mg tablet Unknown at Unknown time  No No   Sig: Take 1 tablet (325 mg total) by mouth daily with breakfast   fluticasone-umeclidinium-vilanterol (Trelegy Ellipta) 100-62 5-25 MCG/INH inhaler Unknown at Unknown time  No No   Sig: Inhale 1 puff  in the morning  Rinse mouth after use      furosemide (LASIX) 40 mg tablet 6/15/2022 at Unknown time  No Yes   Sig: Take 1 tablet (40 mg total) by mouth daily   glucose blood (OneTouch Verio) test strip Unknown at Unknown time  No No   Sig: May substitute brand based on insurance coverage  Check glucose QID  insulin regular (HUMULIN R) 500 units/mL CONCENTRATED injection 6/15/2022 at Unknown time  No Yes   Sig: Inject 0 09 mL (45 Units total) under the skin in the morning and 0 09 mL (45 Units total) at noon and 0 09 mL (45 Units total) in the evening  Inject before meals  ipratropium-albuterol (DUO-NEB) 0 5-2 5 mg/3 mL nebulizer solution Not Taking at Unknown time  No No   Sig: Take 3 mL by nebulization 3 (three) times a day   Patient not taking: No sig reported   lisinopril (ZESTRIL) 10 mg tablet 6/15/2022 at Unknown time  No Yes   Sig: Take 1 tablet (10 mg total) by mouth daily   potassium chloride (K-DUR,KLOR-CON) 20 mEq tablet 6/15/2022 at Unknown time  No Yes   Sig: Take 1 tablet (20 mEq total) by mouth in the morning  simvastatin (ZOCOR) 40 mg tablet Unknown at Unknown time  No No   Sig: Take 1 tablet (40 mg total) by mouth in the morning        Facility-Administered Medications: None       Past Medical History:   Diagnosis Date    Abdominal pain     [facility]2018    Cardiac disease     [facility]     COPD, severe (HCC)     Coronary artery disease     DDD (degenerative disc disease), lumbar 9/1/2020    Diabetes mellitus [facility]     Dyspnea     GERD (gastroesophageal reflux disease)     Hyperlipidemia     Hypertension     [facility] infarction[facility] )     with 3 stents    Nodule of apex of right lung     JACQUELINE (obstructive sleep apnea)     Prostate cancer [facility])        Past Surgical History:   Procedure Laterality Date    ABDOMINAL SURGERY      exploratory    ANGIOPLASTY      3 stents  APPENDECTOMY      COLONOSCOPY      CT NEEDLE BIOPSY LUNG  11/3/2020    ESOPHAGOGASTRODUODENOSCOPY N/A 10/2/2017    Procedure: ESOPHAGOGASTRODUODENOSCOPY (EGD); Surgeon: Kenan Rae MD;  [facility]: [facility]; Service: Gastroenterology    IR THORACENTESIS  11/3/2020    KNEE CARTILAGE SURGERY      OTHER SURGICAL HISTORY      stent placement    PROSTATE SURGERY      SKIN GRAFT      Basal cell CA back       Family History   Problem Relation Age of Onset    Heart disease Father     Other Father         Mesothelioma      I have reviewed and agree with the history as documented  E-Cigarette/Vaping    E-Cigarette Use Never User      E-Cigarette/Vaping Substances    Nicotine No     THC No     CBD No     Flavoring No     Other No     Unknown No      Social History     Tobacco Use    Smoking status: Former Smoker     Packs/day: 1 50     Years: 50 00     Pack years: 75 00     Start date:      Quit date: 2020     Years since quittin 9    Smokeless tobacco: Never Used   Vaping Use    Vaping Use: Never used   Substance Use Topics    Alcohol use: Never    Drug use: No        Review of Systems   Constitutional: Negative for fatigue and fever  HENT: Negative for congestion and trouble swallowing  Eyes: Negative for visual disturbance  Respiratory: Negative for cough, chest tightness and shortness of breath  Cardiovascular: Negative for chest pain  Gastrointestinal: Positive for abdominal pain  Negative for diarrhea, nausea and vomiting  Genitourinary: Positive for testicular pain  Negative for flank pain, hematuria, penile discharge and penile pain  Musculoskeletal: Negative for back pain and gait problem  Skin: Negative for rash and wound  Neurological: Negative for syncope and headaches  Psychiatric/Behavioral: Negative for agitation  All other systems reviewed and are negative        Physical Exam  ED Triage Vitals   Temperature Pulse Respirations Blood Pressure SpO2   06/15/22 0508 06/15/22 0508 06/15/22 0508 06/15/22 0508 06/15/22 0508   97 7 °F (36 5 °C) 55 18 149/67 96 %      Temp Source Heart Rate Source Patient Position - Orthostatic VS BP Location FiO2 (%)   06/15/22 0508 06/15/22 0904 -- 06/15/22 0904 --   Oral Monitor  Left arm       Pain Score       06/15/22 0530       10 - Worst Possible Pain             Orthostatic Vital Signs  Vitals:    06/18/22 0710 06/18/22 1527 06/18/22 2223 06/19/22 0659   BP: (!) 126/47 129/60 133/60 120/54   Pulse: 70 71 76        Physical Exam  Vitals and nursing note reviewed  Constitutional:       Appearance: He is well-developed  He is not diaphoretic  HENT:      Head: Normocephalic and atraumatic  Right Ear: External ear normal       Left Ear: External ear normal    Eyes:      General:         Right eye: No discharge  Left eye: No discharge  Conjunctiva/sclera: Conjunctivae normal    Neck:      Vascular: No JVD  Trachea: No tracheal deviation  Cardiovascular:      Rate and Rhythm: Normal rate and regular rhythm  Heart sounds: Normal heart sounds  Pulmonary:      Effort: Pulmonary effort is normal       Breath sounds: Normal breath sounds  No wheezing  Abdominal:      General: There is no distension  Tenderness: There is abdominal tenderness  There is no guarding or rebound  Genitourinary:     Penis: Normal        Comments: Testicular tenderness, no overlying skin changes or major swelling  Normal lie  Musculoskeletal:         General: Normal range of motion  Cervical back: Normal range of motion  Skin:     General: Skin is warm and dry  Neurological:      Mental Status: He is alert and oriented to person, place, and time     Psychiatric:         Mood and Affect: Mood normal          Speech: Speech normal          Behavior: Behavior normal          ED Medications  Medications   fentanyl citrate (PF) 100 MCG/2ML 50 mcg (50 mcg Intravenous Given 6/15/22 0530)   cefTRIAXone (ROCEPHIN) 2,000 mg in dextrose 5 % 50 mL IVPB (0 mg Intravenous Stopped 6/15/22 1028)       Diagnostic Studies  Results Reviewed     Procedure Component Value Units Date/Time    Urine culture [[id number]]  (Abnormal)  (Susceptibility) Collected: 06/15/22 0814    Lab Status: Final result Specimen: Urine, Clean Catch Updated: 06/18/22 0478     Urine Culture >100,000 cfu/ml Pseudomonas aeruginosa    Susceptibility     Pseudomonas aeruginosa (1)     Antibiotic Interpretation Microscan   Method Status    ZID Performed  Yes  RUBIO Final    Amikacin ($$) Susceptible <=16 ug/ml RUBIO Final    Aztreonam ($$$)  Susceptible <=4 ug/ml RUBIO Final    Cefepime ($) Susceptible 4 00 ug/ml RUBIO Final    Ceftazidime ($$) Susceptible 4 ug/ml RUBIO Final    Ciprofloxacin ($)  Susceptible <=0 25 ug/ml RUBIO Final    Gentamicin ($$) Intermediate 8 ug/ml RUBIO Final    Imipenem Susceptible 2 ug/ml RUBIO Final    Levofloxacin ($) Susceptible <=0 50 ug/ml RUBIO Final    Meropenem ($$) Susceptible <=1 00 ug/ml RUBIO Final    Piperacillin + Tazobactam ($$$) Susceptible <=8 ug/ml RUBIO Final    Tobramycin ($) Susceptible <=2 ug/ml RUBIO Final                   Fingerstick Glucose (POCT) [[id number]]  (Abnormal) Collected: 06/15/22 1359    Lab Status: Final result Updated: 06/15/22 1401     POC Glucose 464 mg/dl     Urine Microscopic [[id number]]  (Abnormal) Collected: 06/15/22 0814    Lab Status: Final result Specimen: Urine, Clean Catch Updated: 06/15/22 0843     RBC, UA 4-10 /hpf      WBC, UA Innumerable /hpf      Epithelial Cells Occasional /hpf      Bacteria, UA Innumerable /hpf     UA w Reflex to Microscopic w Reflex to Culture [[id number]]  (Abnormal) Collected: 06/15/22 0814    Lab Status: Final result Specimen: Urine, Clean Catch Updated: 06/15/22 0828     Color, UA Light Yellow     Clarity, UA Turbid     Specific [place], UA 1 014     pH, UA 5 5     Leukocytes, UA Large     Nitrite, UA Positive     Protein, UA 70 (1+) mg/dl      Glucose, UA 70 (7/100%) mg/dl Ketones, UA Negative mg/dl      Urobilinogen, UA <2 0 mg/dl      Bilirubin, UA Negative     Blood, UA Small    Lactic acid, plasma [[id number]]  (Normal) Collected: 06/15/22 0608    Lab Status: Final result Specimen: Blood from Arm, Right Updated: 06/15/22 0649     LACTIC ACID 1 4 mmol/L     Narrative:      Result may be elevated if tourniquet was used during collection      Comprehensive metabolic panel [[id number]]  (Abnormal) Collected: 06/15/22 0530    Lab Status: Final result Specimen: Blood from Arm, Right Updated: 06/15/22 0612     Sodium 135 mmol/L      Potassium 4 5 mmol/L      Chloride 103 mmol/L      CO2 31 mmol/L      ANION GAP 1 mmol/L      BUN 37 mg/dL      Creatinine 1 54 mg/dL      Glucose 203 mg/dL      Calcium 8 7 mg/dL      Corrected Calcium 9 8 mg/dL      AST 12 U/L      ALT 15 U/L      Alkaline Phosphatase 103 U/L      Total Protein 7 9 g/dL      Albumin 2 6 g/dL      Total Bilirubin 0 33 mg/dL      eGFR 43 ml/min/1 73sq m     Narrative:      [place] guidelines for Chronic Kidney Disease (CKD):     Stage 1 with normal or high GFR (GFR > 90 mL/min/1 73 square meters)    Stage 2 Mild CKD (GFR = 60-89 mL/min/1 73 square meters)    Stage 3A Moderate CKD (GFR = 45-59 mL/min/1 73 square meters)    Stage 3B Moderate CKD (GFR = 30-44 mL/min/1 73 square meters)    Stage 4 Severe CKD (GFR = 15-29 mL/min/1 73 square meters)    Stage 5 End Stage CKD (GFR <15 mL/min/1 73 square meters)  Note: GFR calculation is accurate only with a steady state creatinine    Lipase [[id number]]  (Normal) Collected: 06/15/22 0530    Lab Status: Final result Specimen: Blood from Arm, Right Updated: 06/15/22 0612     Lipase 147 u/L     CBC and differential [[id number]]  (Abnormal) Collected: 06/15/22 0530    Lab Status: Final result Specimen: Blood from Arm, Right Updated: 06/15/22 0541     WBC 12 78 Thousand/uL      RBC 5 09 Million/uL      Hemoglobin 13 1 g/dL      Hematocrit 39 9 %      MCV 78 fL      MCH 25 7 pg      MCHC 32 8 g/dL      RDW 15 4 %      MPV 10 3 fL      Platelets 719 Thousands/uL      nRBC 0 /100 WBCs      Neutrophils Relative 76 %      Immat GRANS % 1 %      Lymphocytes Relative 11 %      Monocytes Relative 10 %      Eosinophils Relative 2 %      Basophils Relative 0 %      Neutrophils Absolute 9 92 Thousands/µL      Immature Grans Absolute 0 06 Thousand/uL      Lymphocytes Absolute 1 36 Thousands/µL      Monocytes Absolute 1 21 Thousand/µL      Eosinophils Absolute 0 20 Thousand/µL      Basophils Absolute 0 03 Thousands/µL                  US scrotum and testicles   Final Result by Kinsey العلي MD (06/15 4758)          1  Bilateral epididymitis and left-sided orchitis  2   Complex left hydrocele possibly reactive related to adjacent inflammation/infection  Recommend follow-up imaging to assess for resolution as blood products could also be present  Workstation performed: [id number]         CT abdomen pelvis wo contrast   Final Result by Eh Chau MD (06/15 2211)         1  Findings most likely representing cellulitis in the area of the mons pubis  No abscess or gas within the soft tissues  2   small right pleural effusion and right lower lobe rounded atelectasis  The study was marked in [facility] for immediate notification  Workstation performed: [id number]               Procedures  Procedures      ED Course                             SBIRT 22yo+    Flowsheet Row Most Recent Value   SBIRT (25 yo +)    In order to provide better care to our patients, we are screening all of our patients for alcohol and drug use  Would it be okay to ask you these screening questions?  Unable to answer at this time Filed at: 06/15/2022 4607                MDM  Number of Diagnoses or Management Options  Cellulitis of abdominal wall  Epididymitis  Orchitis  UTI (urinary tract infection)  Diagnosis management comments: Patient was signed out prior to CT results and final dispo   Findings consistent with cellulitis of lower abdominal wall as well as orchitis  Patient was started on empiric antibiotics and admitted to Medicine for further workup and evaluation  No obvious signs of nec fasc on exam        Disposition  Final diagnoses:   UTI (urinary tract infection)   Epididymitis   Orchitis   Cellulitis of abdominal wall     Time reflects when diagnosis was documented in both MDM as applicable and the Disposition within this note     Time User Action Codes Description Comment    6/15/2022 12:02 PM Lenn Julia Add [N39 0] UTI (urinary tract infection)     6/15/2022 12:02 PM Lenn Julia Add [N45 1] Epididymitis     6/15/2022 12:02 PM Lenn Julia Add [N45 2] Orchitis     6/15/2022 12:02 PM Lenn Julia Add [H81 882] Cellulitis of abdominal wall     6/15/2022 12:24 PM Hope Eagles Add [N45 1] Epididymitis, bilateral     6/16/2022  9:16 AM Yesenia Lenin Add [E11 65,  Z79 4] Type 2 diabetes mellitus with hyperglycemia, with long-term current use of insulin ([facility] [address] )     6/19/2022  9:45 AM Yesenia Lenin Add [J96 11] Chronic respiratory failure with hypoxia ([address] )     6/19/2022  9:48 AM Yesenia Lenin Add [N30 00] Acute cystitis without hematuria     6/19/2022  9:48 AM Yesenia Lenin Add [M54 50,  G89 29] Chronic bilateral low back pain without sciatica     6/19/2022  9:48 AM Yesenia Lenin Add [J44 9] COPD, severe [facility])       ED Disposition     ED Disposition   Admit    Condition   Stable    Date/Time   Wed Melvin 15, 2022 12:02 PM    Comment   Case was discussed with VIKTORIA and the patient's admission status was agreed to be Admission Status: inpatient status to the service of Dr Ryan Held   Follow-up Information     Follow up With Specialties Details Why Contact Info Raiza Gallardo  Follow up  [id number] [place] [address] [phone]     [facility] Follow up in 2 week(s) Service will contact patient/caregiver with hospital follow-up appointment date and time once discharged   [address] [facility] [place] [address]  [address]  [facility] Michael, [address] [place], [place] [address], [place], [address]    Aron Mares MD Family Medicine Call in 1 day(s)  [address]  [address]  [address] [facility] [address] [phone]             Discharge Medication List as of 6/19/2022  9:54 AM      START taking these medications    Details   acetaminophen (TYLENOL) 500 mg tablet Take 2 tablets (1,000 mg total) by mouth every 8 (eight) hours, Starting Sun 6/19/2022, Normal      ciprofloxacin (CIPRO) 500 mg tablet Take 1 tablet (500 mg total) by mouth every 12 (twelve) hours for 15 doses, Starting Sun 6/19/2022, Until Mon 6/27/2022, Normal      gabapentin (NEURONTIN) 100 mg capsule Take 1 capsule (100 mg total) by mouth 3 (three) times a day, Starting Sun 6/19/2022, Normal      lidocaine (XYLOCAINE) 5 % ointment Apply topically as needed for mild pain, Starting Sun 6/19/2022, Normal      oxyCODONE 7 5 MG TABS Take 7 5 mg by mouth 2 (two) times a day as needed for severe pain Max Daily Amount: 15 mg, Starting Sun 6/19/2022, Normal      tamsulosin (FLOMAX) 0 4 mg Take 1 capsule (0 4 mg total) by mouth daily with dinner, Starting Sun 6/19/2022, Normal         CONTINUE these medications which have CHANGED    Details   ipratropium-albuterol (DUO-NEB) 0 5-2 5 mg/3 mL nebulizer solution Take 3 mL by nebulization 3 (three) times a day, Starting Sun 6/19/2022, Normal         CONTINUE these medications which have NOT CHANGED    Details   aspirin (ECOTRIN LOW STRENGTH) 81 mg EC tablet Take 1 tablet (81 mg total) by mouth daily, Starting Sun 5/8/2022, Normal      BD Insulin Syringe U-500 31G X 6MM 0 5 ML MISC USE 1 SYRINGE THREE TIMES A DAY FOR INJECTING INSULIN, Normal      carvedilol (COREG) 6 25 mg tablet Take 1 tablet (6 25 mg total) by mouth 2 (two) times a day with meals, Starting Mon 6/13/2022, Normal      cyclobenzaprine (FLEXERIL) 10 mg tablet Take 1 tablet (10 mg total) by mouth in the morning and 1 tablet (10 mg total) before bedtime  , Starting Tue 5/17/2022, Normal      ferrous sulfate 325 (65 Fe) mg tablet Take 1 tablet (325 mg total) by mouth daily with breakfast, Starting Tue 5/17/2022, Normal      fluticasone-umeclidinium-vilanterol (Trelegy Ellipta) 100-62 5-25 MCG/INH inhaler Inhale 1 puff  in the morning  Rinse mouth after use   , Starting Tue 5/17/2022, Until Thu 6/16/2022, Normal      furosemide (LASIX) 40 mg tablet Take 1 tablet (40 mg total) by mouth daily, Starting Mon 6/13/2022, Normal      glucose blood (OneTouch Verio) test strip May substitute brand based on insurance coverage  Check glucose QID , Normal      insulin regular (HUMULIN R) 500 units/mL CONCENTRATED injection Inject 0 09 mL (45 Units total) under the skin in the morning and 0 09 mL (45 Units total) at noon and 0 09 mL (45 Units total) in the evening  Inject before meals  , Starting Tue 5/17/2022, Normal      lisinopril (ZESTRIL) 10 mg tablet Take 1 tablet (10 mg total) by mouth daily, Starting Mon 6/13/2022, Normal      potassium chloride (K-DUR,KLOR-CON) 20 mEq tablet Take 1 tablet (20 mEq total) by mouth in the morning , Starting Sun 5/22/2022, Normal      simvastatin (ZOCOR) 40 mg tablet Take 1 tablet (40 mg total) by mouth in the morning , Starting Tue 5/17/2022, Normal         STOP taking these medications       celecoxib (CeleBREX) 200 mg capsule Comments:   Reason for Stopping:                 PDMP Review       Value Time User    PDMP Reviewed  Yes 5/5/2022 11:38 PM Chante James DO           ED Provider  Attending physically available and evaluated Jossie Dex Raymundo    I managed the patient along with the ED Attending      Electronically Signed by         Kam Ryder DO  06/20/22 4137

## 2022-06-15 NOTE — ASSESSMENT & PLAN NOTE
Patient presented with progressive scrotal pain for the past 2 days  Ultrasound with  Bilateral epididymitis and left-sided orchitis  Complex left hydrocele possibly reactive related to adjacent inflammation/infection      CT scan with  cellulitis in the area of the mons pubis  Abnormal urinalysis  Patient afebrile with leukocytosis  Continue with IV ceftriaxone  Consult urology for further management  Follow on urine culture  Pain control and supportive care

## 2022-06-15 NOTE — CASE MANAGEMENT
Case Management Assessment & Discharge Planning Note    Patient name Janie Shaikh  [facility] [id number]  : 1945 Date 6/15/2022       Current Admission Date: 6/15/2022  Current Admission Diagnosis:Acute cystitis without hematuria   Patient Active Problem List    Diagnosis Date Noted    Epididymitis, bilateral 06/15/2022    Acute cystitis without hematuria 06/15/2022    Acute respiratory failure with hypoxia ([facility] ) 2022    Chronic bilateral low back pain without sciatica 2022    Shortness of breath 2022    CKD (chronic kidney disease) stage 3, GFR 30-59 ml/min ([facility] ) 2022    History of prostate cancer 2022    Adenocarcinoma of lung ([facility] ) 2022    Fracture of navicular bone of left foot 2021    Chronic respiratory failure with hypoxia ([facility] ) 04/15/2021    PAD (peripheral artery disease) ([facility] ) 2021    DDD (degenerative disc disease), lumbar 2020    Anemia, iron deficiency 2020    Lung nodule 2020    Pulmonary hypertension ([facility] ) 2019    JACQUELINE (obstructive sleep apnea) 2019    COPD with acute exacerbation ([facility] ) 2019    Oxygen dependent 2018    RBBB 2018    CAD (coronary artery disease) 2018    Chronic diastolic heart failure ([facility] ) 2018    Pleural effusion on right 10/31/2017    COPD, severe ([facility] ) 2017    Diabetic neuropathy ([facility] ) 2017    Essential hypertension 2016    Venous stasis dermatitis of both lower extremities 11/15/2016    Type 2 diabetes mellitus with hyperglycemia, with long-term current use of insulin ([facility] ) 2016    COPD exacerbation ([facility] ) 2016    HLD (hyperlipidemia) 2016      LOS (days): 0  Geometric Mean LOS (GMLOS) (days): 3 00  Days to GMLOS:2 9     OBJECTIVE:  PATIENT READMITTED TO HOSPITAL  Risk of Unplanned Readmission Score: 36 5         Current admission status: Inpatient       Preferred Pharmacy:   [address] [place] [address] [address] [place] [address]  Phone: [phone] Fax: [phone]    Primary Care Provider: Cally Peck MD    Primary Insurance: [facility]  Secondary Insurance:     ASSESSMENT:  Medardo Allen Proxies    There are no active Health Care Proxies on file  Advance Directives  Does patient have a [address]?: No  Does patient have Advance Directives?: No         Readmission Root Cause  30 Day Readmission: Yes  Who directed you to return to the hospital?: Self  Did you understand whom to contact if you had questions or problems?: Yes  Did you get your prescriptions before you left the hospital?: Yes  Were you able to get your prescriptions filled when you left the hospital?: Yes  Did you take your medications as prescribed?: Yes  Were you able to get to your follow-up appointments?: Yes  During previous admission, was a post-acute recommendation made?: No  Patient was readmitted due to: epididymitis  Action Plan: pain control    Patient Information  Admitted from[de-identified] Home  Mental Status: Alert  Assessment information provided by[de-identified]  (readmission)  Primary Caregiver: Self  Support Systems: Son, Family members  What city do you live in?: [address] [facility] [address] entry access options   Select all that apply : Stairs  Number of steps to enter home : 1  Type of Current Residence: [place]  In the last 12 months, was there a time when you were not able to pay the mortgage or rent on time?: No  In the last 12 months, was there a time when you did not have a steady place to sleep or slept in a shelter (including now)?: No  Homeless/housing insecurity resource given?: N/A  Living Arrangements: Lives w/ Son    Activities of Daily Living Prior to Admission  Functional Status: Independent  Completes ADLs independently?: Yes  Ambulates independently?: Yes  Does patient use assisted devices?: Yes  Assisted Devices (DME) used: [employer]  Does patient currently own DME?: Yes  What DME does the patient currently own?: [place], Walker, Wheelchair, Portable Oxygen concentrator, Portable Oxygen tanks, Other (Comment) (soo)  Does patient have a history of Outpatient Therapy (PT/OT)?: No  Does the patient have a history of Short-Term Rehab?: No  Does patient have a history of HHC?: Yes ([facility])  Does patient currently have [facility]?: No         Patient Information Continued  Income Source: Pension/intermediate  Within the past 12 months, you worried that your food would run out before you got the money to buy more : Never true  Within the past 12 months, the food you bought just didn't last and you didn't have money to get more : Never true  Food insecurity resource given?: N/A  Does patient receive dialysis treatments?: No  Does patient have a history of substance abuse?: No  Does patient have a history of Mental Health Diagnosis?: No         Means of Transportation  Means of Transport to Appts[de-identified] Drives Self  In the past 12 months, has lack of transportation kept you from medical appointments or from getting medications?: No  In the past 12 months, has lack of transportation kept you from meetings, work, or from getting things needed for daily living?: No  Was application for public transport provided?: N/A        DISCHARGE DETAILS:    Discharge planning discussed with[de-identified] readmission-info from prior admit  Freedom of Choice: Yes                   Contacts  Patient Contacts: Ramon Milligan  Relationship to Patient[de-identified] Family  Contact Method: Phone  Phone Number: [phone]  Reason/Outcome: Continuity of Care, Discharge Planning, Emergency Contact                   Would you like to participate in our [address] service program?  : No - Declined    Treatment Team Recommendation: Home     CM reviewed d/c planning process including the following: identifying help at home, patient preference for d/c planning needs, Discharge Cinthia Chavis Meds to Bed program, availability of treatment team to discuss questions or concerns patient and/or family may have regarding understanding medications and recognizing signs and symptoms once discharged  CM also encouraged patient to follow up with all recommended appointments after discharge  Patient advised of importance for patient and family to participate in managing patients medical well being  Patient/caregiver received discharge checklist   Content reviewed  Patient/caregiver encouraged to participate in discharge plan of care prior to discharge home

## 2022-06-15 NOTE — UTILIZATION REVIEW
Notification of Discharge   This is a Notification of Discharge from our facility [address]  Please be advised that this patient has been discharge from our facility  Below you will find the admission and discharge date and time including the patients disposition  UTILIZATION REVIEW CONTACT:  Paulina Loo  Utilization   Network Utilization Review Department  Phone: [phone] x carefully listen to the prompts  All voicemails are confidential   Email: [email or web address]     PHYSICIAN ADVISORY SERVICES:  FOR [id number] REVIEW - MEDICAL NECESSITY DENIAL  Phone: [phone]  Fax: [phone]  Email: [email or web address]     PRESENTATION DATE: 6/9/2022  5:27 PM  OBERVATION ADMISSION DATE:   INPATIENT ADMISSION DATE: 6/9/22  7:35 PM   DISCHARGE DATE: 6/12/2022  2:02 PM  DISPOSITION: Home/Self Care Home/Self Care      IMPORTANT INFORMATION:  Send all requests for admission clinical reviews, approved or denied determinations and any other requests to dedicated fax number below belonging to the campus where the patient is receiving treatment   List of dedicated fax numbers:  [address] DENIALS (Administrative/Medical Necessity) [phone]   [address]  (Maternity/NICU/Pediatrics) [phone]   [facility] [phone]   [address] [facility] [phone]   [address] [place] [address] [phone]   [address] [phone]   [facility]  [phone]   [address] [place] [address], [address] W  [address] [facility] [address] [facility] [phone]

## 2022-06-15 NOTE — ASSESSMENT & PLAN NOTE
history of CAD with prior stenting (D2 and RCA x2 prior to 2015)    · Continue aspirin carvedilol statin

## 2022-06-15 NOTE — ED NOTES
Pt reports feeling hungry, message sent to Riky Liriano to verify ok to eat     Simona Dudley RN  06/15/22 2976

## 2022-06-15 NOTE — H&P
[address] [facility]  H&P- Era Choudhury   1945, 78 y o  male MRN: [id number]  Unit/Bed#: ED 08 Encounter: [id number]  Primary Care Provider: Dalton Gee MD   Date and time admitted to hospital: 6/15/2022  5:05 AM    * Epididymitis, bilateral  Assessment & Plan  Patient presented with progressive scrotal pain for the past 2 days  Ultrasound with  Bilateral epididymitis and left-sided orchitis  Complex left hydrocele possibly reactive related to adjacent inflammation/infection  CT scan with  cellulitis in the area of the mons pubis  Abnormal urinalysis  Patient afebrile with leukocytosis  Continue with IV ceftriaxone  Consult urology for further management  Follow on urine culture  Pain control and supportive care    Chronic respiratory failure with hypoxia ([address] )  Assessment & Plan  · On baseline 2-3L NC       CAD (coronary artery disease)  Assessment & Plan  history of CAD with prior stenting (D2 and RCA x2 prior to 2015)    · Continue aspirin carvedilol statin      COPD, severe (HCC)  Assessment & Plan  · Stable  · Continue nebs, inhalers      CKD (chronic kidney disease) stage 3, GFR 30-59 ml/min [facility])  Assessment & Plan  Lab Results   Component Value Date    EGFR 43 06/15/2022    EGFR 58 06/12/2022    EGFR 65 06/11/2022    CREATININE 1 54 (H) 06/15/2022    CREATININE 1 21 06/12/2022    CREATININE 1 11 06/11/2022   Baseline appears around 1 2-1 4  Monitor    Adenocarcinoma of lung [facility])  Assessment & Plan  Follows with radiation oncology as outpatient, most recent office note reviewed from 9/2021  · Recent CTA chest 3/29/22 without acute findings in chest  · Continue outpatient f/u      Essential hypertension  Assessment & Plan  Acceptable BP  Continue home meds    Chronic diastolic heart failure (HCC)  Assessment & Plan  Wt Readings from Last 3 Encounters:   06/09/22 99 8 kg (220 lb)   05/17/22 98 9 kg (218 lb)   05/08/22 103 kg (226 lb)     Continue home dose Lasix        Venous stasis dermatitis of both lower extremities  Assessment & Plan  Continue with local care    Type 2 diabetes mellitus with hyperglycemia, with long-term current use of insulin ([facility])  Assessment & Plan  Lab Results   Component Value Date    HGBA1C 9 2 (H) 04/22/2022       No results for input(s): POCGLU in the last 72 hours  Blood Sugar Average: Last 72 hrs:     Continue home dose insulin  Monitor blood sugar    VTE Pharmacologic Prophylaxis: VTE Score: 10 High Risk (Score >/= 5) - Pharmacological DVT Prophylaxis Ordered: heparin  Sequential Compression Devices Ordered  Code Status:  Full code    Anticipated Length of Stay: Patient will be admitted on an inpatient basis with an anticipated length of stay of greater than 2 midnights secondary to Management of epididymitis  Total Time for Visit, including Counseling / Coordination of Care: 60 minutes Greater than 50% of this total time spent on direct patient counseling and coordination of care  Chief Complaint:   Scrotal swelling and pain    History of Present Illness:    Cydney Kahn Sr  is a 78 y o  male with a PMH of lung adenocarcinoma , CAD status post stenting , severe COPD, chronic hypoxic respiratory failure on home oxygen, CKD stage 3, venous stasis dermatitis and uncontrolled diabetes mellitus type 2 on insulin who presents with progressive scrotal pain and swelling over the past 2 days  Patient was recently hospitalized due to shortness of breath and COPD exacerbation and he was discharged home on 6/12/2022  He presented today with above symptoms including scrotal swelling, pain and tenderness   Denied fever or chills, no nausea vomiting or diarrhea  No dysuria or urinary retention    Patient was evaluated in the emergency room he was found to have abnormal urinalysis, cellulitis on CT scan and bilateral epididymitis and orchitis on scrotal ultrasound    Patient afebrile with leukocytosis  Received IV ceftriaxone    Review of Systems:  Review of Systems   Constitutional: Negative for chills and fever  HENT: Negative for sore throat  Respiratory: Positive for shortness of breath (Chronic)  Negative for chest tightness  Cardiovascular: Negative for chest pain, palpitations and leg swelling  Gastrointestinal: Positive for abdominal pain  Negative for blood in stool, diarrhea, nausea and vomiting  Genitourinary: Positive for scrotal swelling and testicular pain  Negative for difficulty urinating and dysuria  Neurological: Negative for dizziness, speech difficulty and headaches  All other systems reviewed and are negative  Past Medical and Surgical History:   Past Medical History:   Diagnosis Date    Abdominal pain     MARANDA [facility]acute kidney [facility]) [facility] ) 6/19/2018    Cardiac disease     CH[facility]congestive heart failure) ([facility])     COPD, severe (HCC)     Coronary artery disease     DDD (degenerative disc disease), lumbar 9/1/2020    Diabetes mellitus ([place] [address] )     Dyspnea     GERD (gastroesophageal reflux disease)     Hyperlipidemia     Hypertension     MI (myocardial infarction) ([place] [address] )     with 3 stents    Nodule of apex of right lung     JACQUELINE (obstructive sleep apnea)     Prostate cancer [facility])        Past Surgical History:   Procedure Laterality Date    ABDOMINAL SURGERY      exploratory    ANGIOPLASTY      3 stents    APPENDECTOMY      COLONOSCOPY  2015    CT NEEDLE BIOPSY LUNG  11/3/2020    ESOPHAGOGASTRODUODENOSCOPY N/A 10/2/2017    Procedure: ESOPHAGOGASTRODUODENOSCOPY (EGD); Surgeon: Marleny Kwon MD;  [facility]: [facility]; Service: Gastroenterology    IR THORACENTESIS  11/3/2020    KNEE CARTILAGE SURGERY      OTHER SURGICAL HISTORY      stent placement    PROSTATE SURGERY      SKIN GRAFT      Basal cell CA back       Meds/Allergies:  Prior to Admission medications    Medication Sig Start Date End Date Taking?  Authorizing Provider   aspirin (ECOTRIN LOW STRENGTH) 81 mg EC tablet Take 1 tablet (81 mg total) by mouth daily 5/8/22   Deyvi Rosario DO   BD Insulin Syringe U-500 31G X 6MM 0 5 ML MISC USE 1 SYRINGE THREE TIMES A DAY FOR INJECTING INSULIN 10/27/21   Chang Rascon MD   carvedilol (COREG) 6 25 mg tablet Take 1 tablet (6 25 mg total) by mouth 2 (two) times a day with meals 6/13/22   Luz Beltre MD   celecoxib (CeleBREX) 200 mg capsule Take 1 capsule (200 mg total) by mouth in the morning  5/17/22   Luz Beltre MD   cyclobenzaprine (FLEXERIL) 10 mg tablet Take 1 tablet (10 mg total) by mouth in the morning and 1 tablet (10 mg total) before bedtime  5/17/22   Luz Beltre MD   ferrous sulfate 325 (65 Fe) mg tablet Take 1 tablet (325 mg total) by mouth daily with breakfast 5/17/22   Luz Beltre MD   fluticasone-umeclidinium-vilanterol (Trelegy Ellipta) 149-53 8-35 MCG/INH inhaler Inhale 1 puff  in the morning  Rinse mouth after use    5/17/22 6/16/22  Luz Beltre MD   furosemide (LASIX) 40 mg tablet Take 1 tablet (40 mg total) by mouth daily 6/13/22   Luz Beltre MD   glucose blood (OneTouch Verio) test strip May substitute brand based on insurance coverage  Check glucose QID  2/23/22   YOLANDE Fernandez   insulin regular (HUMULIN R) 500 units/mL CONCENTRATED injection Inject 0 09 mL (45 Units total) under the skin in the morning and 0 09 mL (45 Units total) at noon and 0 09 mL (45 Units total) in the evening  Inject before meals  5/17/22   Luz Beltre MD   ipratropium-albuterol (DUO-NEB) 0 5-2 5 mg/3 mL nebulizer solution Take 3 mL by nebulization 3 (three) times a day  Patient not taking: No sig reported 5/3/22   Velma Sanchez MD   lisinopril (ZESTRIL) 10 mg tablet Take 1 tablet (10 mg total) by mouth daily 6/13/22   Luz Beltre MD   potassium chloride (K-DUR,KLOR-CON) 20 mEq tablet Take 1 tablet (20 mEq total) by mouth in the morning  5/22/22   Marcia Workman PA-C   simvastatin (ZOCOR) 40 mg tablet Take 1 tablet (40 mg total) by mouth in the morning   5/17/22   Luz Beltre MD     I have reviewed home medications with a medical source (PCP, Pharmacy, other)  Allergies: Allergies   Allergen Reactions    Crestor [Rosuvastatin] Other (See Comments)     Unknown      Metformin GI Intolerance       Social History:  Marital Status:      Substance Use History:   Social History     Substance and Sexual Activity   Alcohol Use Never     Social History     Tobacco Use   Smoking Status Former Smoker    Packs/day: 1 50    Years: 50 00    Pack years: 75 00    Start date: 38    Quit date: 2020    Years since quittin 9   Smokeless Tobacco Never Used     Social History     Substance and Sexual Activity   Drug Use No       Family History:    Family History   Problem Relation Age of Onset    Heart disease Father     Other Father         Mesothelioma      Physical Exam:     Vitals:   Blood Pressure: 132/60 (06/15/22 0904)  Pulse: 92 (06/15/22 0904)  Temperature: 97 7 °F (36 5 °C) (06/15/22 0508)  Temp Source: Oral (06/15/22 0508)  Respirations: 18 (06/15/22 0904)  SpO2: 95 % (06/15/22 09)    Physical Exam  Vitals reviewed  Constitutional:       Appearance: Normal appearance  He is ill-appearing  HENT:      Head: Normocephalic and atraumatic  Mouth/Throat:      Mouth: Mucous membranes are moist       Pharynx: No oropharyngeal exudate  Eyes:      General: No scleral icterus  Extraocular Movements: Extraocular movements intact  Cardiovascular:      Rate and Rhythm: Normal rate and regular rhythm  Pulses: Normal pulses  Heart sounds: Normal heart sounds  No murmur heard  Pulmonary:      Effort: Respiratory distress present  Comments: Decreased breath sounds bilateral  Abdominal:      General: Bowel sounds are normal  There is no distension  Palpations: Abdomen is soft  Tenderness: There is no abdominal tenderness     Genitourinary:     Comments: Scrotal edema with swelling and erythema  Tender to touch  Musculoskeletal:         General: Normal range of motion  Cervical back: Normal range of motion and neck supple  Right lower leg: No edema  Left lower leg: No edema  Comments: Bilateral lower extremity venous stasis edema   Skin:     General: Skin is warm and dry  Neurological:      General: No focal deficit present  Mental Status: He is alert and oriented to person, place, and time  Cranial Nerves: No cranial nerve deficit  Additional Data:     Lab Results:  Results from last 7 days   Lab Units 06/15/22  0530   WBC Thousand/uL 12 78*   HEMOGLOBIN g/dL 13 1   HEMATOCRIT % 39 9   PLATELETS Thousands/uL 224   NEUTROS PCT % 76*   LYMPHS PCT % 11*   MONOS PCT % 10   EOS PCT % 2     Results from last 7 days   Lab Units 06/15/22  0530   SODIUM mmol/L 135*   POTASSIUM mmol/L 4 5   CHLORIDE mmol/L 103   CO2 mmol/L 31   BUN mg/dL 37*   CREATININE mg/dL 1 54*   ANION GAP mmol/L 1*   CALCIUM mg/dL 8 7   ALBUMIN g/dL 2 6*   TOTAL BILIRUBIN mg/dL 0 33   ALK PHOS U/L 103   ALT U/L 15   AST U/L 12   GLUCOSE RANDOM mg/dL 203*         Results from last 7 days   Lab Units 06/12/22  1121 06/12/22  0823 06/11/22  2119 06/11/22  1726 06/11/22  1556 06/11/22  1409 06/11/22  1229 06/11/22  1011 06/11/22  0810 06/11/22  0603 06/11/22  0405 06/11/22  0205   POC GLUCOSE mg/dl 293* 216* 231* 106 103 112 170* 342* 146* 133 100 180*         Results from last 7 days   Lab Units 06/15/22  0608 06/09/22  2054 06/09/22  1816   LACTIC ACID mmol/L 1 4  --   --    PROCALCITONIN ng/ml  --  0 08 0 09       Imaging:  Reviewed  US scrotum and testicles   Final Result by Lucho Boswell MD (06/15 1048)          1  Bilateral epididymitis and left-sided orchitis  2   Complex left hydrocele possibly reactive related to adjacent inflammation/infection  Recommend follow-up imaging to assess for resolution as blood products could also be present         Workstation performed: [id number]         CT abdomen pelvis wo contrast   Final Result by Shelbi Gaxiola Lilian Treadwell MD (06/15 8390)         1  Findings most likely representing cellulitis in the area of the mons pubis  No abscess or gas within the soft tissues  2   small right pleural effusion and right lower lobe rounded atelectasis  The study was marked in [facility] for immediate notification  Workstation performed: [id number]             EKG and Other Studies Reviewed on Admission:   · yes    ** Please Note: This note has been constructed using a voice recognition system   **

## 2022-06-15 NOTE — ASSESSMENT & PLAN NOTE
Wt Readings from Last 3 Encounters:   06/09/22 99 8 kg (220 lb)   05/17/22 98 9 kg (218 lb)   05/08/22 103 kg (226 lb)     Continue home dose Lasix

## 2022-06-16 LAB
ANION GAP SERPL CALCULATED.3IONS-SCNC: 1 MMOL/L (ref 4–13)
BUN SERPL-MCNC: 24 MG/DL (ref 5–25)
CALCIUM SERPL-MCNC: 8.6 MG/DL (ref 8.3–10.1)
CHLORIDE SERPL-SCNC: 100 MMOL/L (ref 100–108)
CO2 SERPL-SCNC: 32 MMOL/L (ref 21–32)
CREAT SERPL-MCNC: 1.29 MG/DL (ref 0.6–1.3)
ERYTHROCYTE [DISTWIDTH] IN BLOOD BY AUTOMATED COUNT: 15.2 % (ref 11.6–15.1)
GFR SERPL CREATININE-BSD FRML MDRD: 54 ML/MIN/1.73SQ M
GLUCOSE SERPL-MCNC: 104 MG/DL (ref 65–140)
GLUCOSE SERPL-MCNC: 131 MG/DL (ref 65–140)
GLUCOSE SERPL-MCNC: 229 MG/DL (ref 65–140)
GLUCOSE SERPL-MCNC: 265 MG/DL (ref 65–140)
GLUCOSE SERPL-MCNC: 302 MG/DL (ref 65–140)
GLUCOSE SERPL-MCNC: 84 MG/DL (ref 65–140)
HCT VFR BLD AUTO: 40.1 % (ref 36.5–49.3)
HGB BLD-MCNC: 12.7 G/DL (ref 12–17)
MCH RBC QN AUTO: 26 PG (ref 26.8–34.3)
MCHC RBC AUTO-ENTMCNC: 31.7 G/DL (ref 31.4–37.4)
MCV RBC AUTO: 82 FL (ref 82–98)
PLATELET # BLD AUTO: 165 THOUSANDS/UL (ref 149–390)
PMV BLD AUTO: 9.8 FL (ref 8.9–12.7)
POTASSIUM SERPL-SCNC: 4.4 MMOL/L (ref 3.5–5.3)
RBC # BLD AUTO: 4.89 MILLION/UL (ref 3.88–5.62)
SODIUM SERPL-SCNC: 133 MMOL/L (ref 136–145)
WBC # BLD AUTO: 9.56 THOUSAND/UL (ref 4.31–10.16)

## 2022-06-16 PROCEDURE — 94640 AIRWAY INHALATION TREATMENT: CPT

## 2022-06-16 PROCEDURE — 82948 REAGENT STRIP/BLOOD GLUCOSE: CPT

## 2022-06-16 PROCEDURE — 99232 SBSQ HOSP IP/OBS MODERATE 35: CPT | Performed by: NURSE PRACTITIONER

## 2022-06-16 PROCEDURE — 94760 N-INVAS EAR/PLS OXIMETRY 1: CPT

## 2022-06-16 PROCEDURE — 99222 1ST HOSP IP/OBS MODERATE 55: CPT | Performed by: INTERNAL MEDICINE

## 2022-06-16 PROCEDURE — 80048 BASIC METABOLIC PNL TOTAL CA: CPT | Performed by: GENERAL PRACTICE

## 2022-06-16 PROCEDURE — 97163 PT EVAL HIGH COMPLEX 45 MIN: CPT

## 2022-06-16 PROCEDURE — 99232 SBSQ HOSP IP/OBS MODERATE 35: CPT | Performed by: GENERAL PRACTICE

## 2022-06-16 PROCEDURE — 85027 COMPLETE CBC AUTOMATED: CPT | Performed by: GENERAL PRACTICE

## 2022-06-16 PROCEDURE — 97166 OT EVAL MOD COMPLEX 45 MIN: CPT

## 2022-06-16 RX ORDER — HYDROMORPHONE HCL IN WATER/PF 6 MG/30 ML
0.2 PATIENT CONTROLLED ANALGESIA SYRINGE INTRAVENOUS
Status: DISCONTINUED | OUTPATIENT
Start: 2022-06-16 | End: 2022-06-19 | Stop reason: HOSPADM

## 2022-06-16 RX ORDER — OXYCODONE HYDROCHLORIDE 5 MG/1
7.5 TABLET ORAL EVERY 4 HOURS PRN
Status: DISCONTINUED | OUTPATIENT
Start: 2022-06-16 | End: 2022-06-19 | Stop reason: HOSPADM

## 2022-06-16 RX ORDER — INSULIN LISPRO 100 [IU]/ML
4-20 INJECTION, SOLUTION INTRAVENOUS; SUBCUTANEOUS EVERY 6 HOURS
Status: DISCONTINUED | OUTPATIENT
Start: 2022-06-16 | End: 2022-06-19 | Stop reason: HOSPADM

## 2022-06-16 RX ADMIN — IPRATROPIUM BROMIDE 0.5 MG: 0.5 SOLUTION RESPIRATORY (INHALATION) at 14:33

## 2022-06-16 RX ADMIN — FLUTICASONE FUROATE AND VILANTEROL TRIFENATATE 1 PUFF: 200; 25 POWDER RESPIRATORY (INHALATION) at 08:17

## 2022-06-16 RX ADMIN — OXYCODONE HYDROCHLORIDE 5 MG: 5 TABLET ORAL at 12:55

## 2022-06-16 RX ADMIN — TAMSULOSIN HYDROCHLORIDE 0.4 MG: 0.4 CAPSULE ORAL at 17:03

## 2022-06-16 RX ADMIN — CYCLOBENZAPRINE HYDROCHLORIDE 10 MG: 10 TABLET, FILM COATED ORAL at 08:14

## 2022-06-16 RX ADMIN — LEVALBUTEROL HYDROCHLORIDE 1.25 MG: 1.25 SOLUTION, CONCENTRATE RESPIRATORY (INHALATION) at 14:33

## 2022-06-16 RX ADMIN — OXYCODONE HYDROCHLORIDE 5 MG: 5 TABLET ORAL at 02:13

## 2022-06-16 RX ADMIN — LEVALBUTEROL HYDROCHLORIDE 1.25 MG: 1.25 SOLUTION, CONCENTRATE RESPIRATORY (INHALATION) at 19:31

## 2022-06-16 RX ADMIN — ACETAMINOPHEN 975 MG: 325 TABLET, FILM COATED ORAL at 22:47

## 2022-06-16 RX ADMIN — INSULIN LISPRO 2 UNITS: 100 INJECTION, SOLUTION INTRAVENOUS; SUBCUTANEOUS at 08:15

## 2022-06-16 RX ADMIN — HEPARIN SODIUM 5000 UNITS: 5000 INJECTION INTRAVENOUS; SUBCUTANEOUS at 13:46

## 2022-06-16 RX ADMIN — LISINOPRIL 10 MG: 10 TABLET ORAL at 08:14

## 2022-06-16 RX ADMIN — HEPARIN SODIUM 5000 UNITS: 5000 INJECTION INTRAVENOUS; SUBCUTANEOUS at 04:44

## 2022-06-16 RX ADMIN — INSULIN HUMAN 45 UNITS: 500 INJECTION, SOLUTION SUBCUTANEOUS at 11:42

## 2022-06-16 RX ADMIN — FERROUS SULFATE TAB 325 MG (65 MG ELEMENTAL FE) 325 MG: 325 (65 FE) TAB at 08:14

## 2022-06-16 RX ADMIN — IPRATROPIUM BROMIDE 0.5 MG: 0.5 SOLUTION RESPIRATORY (INHALATION) at 19:31

## 2022-06-16 RX ADMIN — INSULIN HUMAN 45 UNITS: 500 INJECTION, SOLUTION SUBCUTANEOUS at 08:15

## 2022-06-16 RX ADMIN — HYDROMORPHONE HYDROCHLORIDE 0.2 MG: 0.2 INJECTION, SOLUTION INTRAMUSCULAR; INTRAVENOUS; SUBCUTANEOUS at 13:44

## 2022-06-16 RX ADMIN — CARVEDILOL 6.25 MG: 6.25 TABLET, FILM COATED ORAL at 17:03

## 2022-06-16 RX ADMIN — PRAVASTATIN SODIUM 80 MG: 80 TABLET ORAL at 17:03

## 2022-06-16 RX ADMIN — CEFTRIAXONE 2000 MG: 2 INJECTION, POWDER, FOR SOLUTION INTRAMUSCULAR; INTRAVENOUS at 08:14

## 2022-06-16 RX ADMIN — OXYCODONE HYDROCHLORIDE 7.5 MG: 5 TABLET ORAL at 22:46

## 2022-06-16 RX ADMIN — OXYCODONE HYDROCHLORIDE 7.5 MG: 5 TABLET ORAL at 18:30

## 2022-06-16 RX ADMIN — INSULIN HUMAN 40 UNITS: 500 INJECTION, SOLUTION SUBCUTANEOUS at 18:34

## 2022-06-16 RX ADMIN — FUROSEMIDE 40 MG: 40 TABLET ORAL at 08:14

## 2022-06-16 RX ADMIN — INSULIN LISPRO 2 UNITS: 100 INJECTION, SOLUTION INTRAVENOUS; SUBCUTANEOUS at 11:42

## 2022-06-16 RX ADMIN — ACETAMINOPHEN 975 MG: 325 TABLET, FILM COATED ORAL at 13:45

## 2022-06-16 RX ADMIN — CARVEDILOL 6.25 MG: 6.25 TABLET, FILM COATED ORAL at 08:14

## 2022-06-16 RX ADMIN — ACETAMINOPHEN 975 MG: 325 TABLET, FILM COATED ORAL at 04:43

## 2022-06-16 RX ADMIN — HEPARIN SODIUM 5000 UNITS: 5000 INJECTION INTRAVENOUS; SUBCUTANEOUS at 22:47

## 2022-06-16 RX ADMIN — CYCLOBENZAPRINE HYDROCHLORIDE 10 MG: 10 TABLET, FILM COATED ORAL at 22:47

## 2022-06-16 RX ADMIN — ASPIRIN 81 MG: 81 TABLET, COATED ORAL at 08:14

## 2022-06-16 RX ADMIN — POTASSIUM CHLORIDE 20 MEQ: 1500 TABLET, EXTENDED RELEASE ORAL at 08:14

## 2022-06-16 NOTE — CASE MANAGEMENT
Case Management Discharge Planning Note    Patient name Nicolasa Drilling   Location [facility] 179-16 MRN [id number]  : 1945 Date 2022       Current Admission Date: 6/15/2022  Current Admission Diagnosis:Acute cystitis without hematuria   Patient Active Problem List    Diagnosis Date Noted    Epididymitis, bilateral 06/15/2022    Acute cystitis without hematuria 06/15/2022    Acute respiratory failure with hypoxia ([address] ) 2022    Chronic bilateral low back pain without sciatica 2022    Shortness of breath 2022    CKD (chronic kidney disease) stage 3, GFR 30-59 ml/min ([facility] ) 2022    History of prostate cancer 2022    Adenocarcinoma of lung ([facility] ) 2022    Fracture of navicular bone of left foot 2021    Chronic respiratory failure with hypoxia ([facility] ) 04/15/2021    PAD (peripheral artery disease) ([facility] ) 2021    DDD (degenerative disc disease), lumbar 2020    Anemia, iron deficiency 2020    Lung nodule 2020    Pulmonary hypertension ([address] ) 2019    JACQUELINE (obstructive sleep apnea) 2019    COPD with acute exacerbation ([facility] ) 2019    Oxygen dependent 2018    RBBB 2018    CAD (coronary artery disease) 2018    Chronic diastolic heart failure ([facility] ) 2018    Pleural effusion on right 10/31/2017    COPD, severe ([address] ) 2017    Diabetic neuropathy ([facility] ) 2017    Essential hypertension 2016    Venous stasis dermatitis of both lower extremities 11/15/2016    Type 2 diabetes mellitus with hyperglycemia, with long-term current use of insulin ([facility] ) 2016    COPD exacerbation ([facility] ) 2016    HLD (hyperlipidemia) 2016      LOS (days): 1  Geometric Mean LOS (GMLOS) (days): 3 00  Days to GMLOS:1 8     OBJECTIVE:  Risk of Unplanned Readmission Score: 40 45         Current admission status: Inpatient   Preferred Pharmacy:   [facility] [address] [facility] 8[facility], [address] [place] [address] [address]  Phone: [phone] Fax: [phone]    Primary Care Provider: Grecia Corona MD    Primary Insurance: [facility]  Secondary Insurance:     DISCHARGE DETAILS:    Discharge planning discussed with[de-identified] patient  Freedom of Choice: Yes     CM contacted family/caregiver?: No- see comments  Were Treatment Team discharge recommendations reviewed with patient/caregiver?: Yes  Did patient/caregiver verbalize understanding of patient care needs?: Yes  Were patient/caregiver advised of the risks associated with not following Treatment Team discharge recommendations?: Yes         [facility]         Is the patient interested in Abrilu Tulio at discharge?: Yes  Via Freddie Aguilar requested[de-identified] Occupational Therapy, Physical [address] [place], [place] [address]e Name[de-identified] Other  [address] [place] [address] Provider[de-identified] PCP  Home Health Services Needed[de-identified] Evaluate Functional Status and Safety, Gait/ADL Training, Strengthening/Theraputic Exercises to Improve Function  Homebound Criteria Met[de-identified] Uses an Assist Device (i e  cane, walker, etc)  Supporting Clincal Findings[de-identified] Limited Endurance         Other Referral/Resources/Interventions Provided:  Referral Comments: Pt discussed during care cooridnation rounds  Will need home therapy  S/w pt & agreeable to blanket referrals to [facility]  Referrals placed  CM to f/u for accepting agency & update DCI

## 2022-06-16 NOTE — PLAN OF CARE
Problem: PAIN - ADULT  Goal: Verbalizes/displays adequate comfort level or baseline comfort level  Description: Interventions:  - Encourage patient to monitor pain and request assistance  - Assess pain using appropriate pain scale  - Administer analgesics based on type and severity of pain and evaluate response  - Implement non-pharmacological measures as appropriate and evaluate response  - Consider cultural and social influences on pain and pain management  - Notify physician/advanced practitioner if interventions unsuccessful or patient reports new pain  6/16/2022 0339 by Jenniffer Coleman RN  Outcome: Progressing  6/15/2022 1709 by Jenniffer Coleman RN  Outcome: Progressing     Problem: INFECTION - ADULT  Goal: Absence or prevention of progression during hospitalization  Description: INTERVENTIONS:  - Assess and monitor for signs and symptoms of infection  - Monitor lab/diagnostic results  - Monitor all insertion sites, i e  indwelling lines, tubes, and drains  - Monitor endotracheal if appropriate and nasal secretions for changes in amount and color  - [place] appropriate cooling/warming therapies per order  - Administer medications as ordered  - Instruct and encourage patient and family to use good hand hygiene technique  - Identify and instruct in appropriate isolation precautions for identified infection/condition  6/16/2022 0339 by Jenniffer Coleman RN  Outcome: Progressing  6/15/2022 1709 by Jenniffer Coleman RN  Outcome: Progressing

## 2022-06-16 NOTE — PROGRESS NOTES
[facility]  Progress Note - Montana Maria Sr  1945, 78 y o  male MRN: [id number]  Unit/Bed#: [facility] 827-01 Encounter: [id number]  Primary Care Provider: Cailin Gaxiola MD   Date and time admitted to hospital: 6/15/2022  5:05 AM    * Acute cystitis without hematuria  Assessment & Plan  · U Cx > 100,000 GNR  · Rocephin    Epididymitis, bilateral  Assessment & Plan  Patient presented with progressive scrotal pain for the past 2 days  Ultrasound with  Bilateral epididymitis and left-sided orchitis  Complex left hydrocele possibly reactive related to adjacent inflammation/infection  CT scan with cellulitis in the area of the mons pubis  Abnormal urinalysis  Patient afebrile with leukocytosis  Continue with IV ceftriaxone  Consult urology for further management - appreciated  Follow on urine culture  Pain control and supportive care - Increase oxycodone  Can trial 10 mg if needed    CKD (chronic kidney disease) stage 3, GFR 30-59 ml/min [facility])  Assessment & Plan  Lab Results   Component Value Date    EGFR 54 06/16/2022    EGFR 43 06/15/2022    EGFR 58 06/12/2022    CREATININE 1 29 06/16/2022    CREATININE 1 54 (H) 06/15/2022    CREATININE 1 21 06/12/2022   Estimated Creatinine Clearance: 61 5 mL/min (by C-G formula based on SCr of 1 29 mg/dL)  Baseline appears around 1 2-1 4  Monitor    Adenocarcinoma of lung [facility])  Assessment & Plan  Follows with radiation oncology as outpatient, most recent office note reviewed from 9/2021  · Recent CTA chest 3/29/22 without acute findings in chest  · Continue outpatient f/u      Chronic respiratory failure with hypoxia ([address] )  Assessment & Plan  · On baseline 2-3L NC       CAD (coronary artery disease)  Assessment & Plan  history of CAD with prior stenting (D2 and RCA x2 prior to 2015)    · Continue aspirin carvedilol statin      Essential hypertension  Assessment & Plan  Acceptable BP  Continue home meds    Chronic diastolic heart failure [facility])  Assessment & Plan  Wt Readings from Last 3 Encounters:   06/15/22 99 8 kg (220 lb)   22 99 8 kg (220 lb)   22 98 9 kg (218 lb)     Continue home dose Lasix        COPD, severe (HCC)  Assessment & Plan  · Stable  · Continue nebs, inhalers      Venous stasis dermatitis of both lower extremities  Assessment & Plan  Continue with local care    Type 2 diabetes mellitus with hyperglycemia, with long-term current use of insulin [facility])  Assessment & Plan  Lab Results   Component Value Date    HGBA1C 9 2 (H) 2022       Recent Labs     06/15/22  1628 06/15/22  2233 22  0722 22  1038   POCGLU 398* 374* 229* 265*       Blood Sugar Average: Last 72 hrs:  (P) 346   Continue U500  Monitor blood sugar  BSs high, will consult endo to help maange these BSs  VTE Pharmacologic Prophylaxis: VTE Score: 10 High Risk (Score >/= 5) - Pharmacological DVT Prophylaxis Ordered: heparin  Sequential Compression Devices Ordered  Patient Centered Rounds: I performed bedside rounds with nursing staff today  Discussions with Specialists or Other Care Team Provider: rosendo    Education and Discussions with Family / Patient: Patient declined call to   Time Spent for Care: 30 minutes  More than 50% of total time spent on counseling and coordination of care as described above  Current Length of Stay: 1 day(s)  Current Patient Status: Inpatient   Certification Statement: The patient will continue to require additional inpatient hospital stay due to need for IV Rocephin and pain control  Discharge Plan: Anticipate discharge in 24-48 hrs to home with home services      Code Status: Level 1 - Full Code    Subjective:   Severe scrotal pain    Objective:     Vitals:   Temp (24hrs), Av °F (36 7 °C), Min:97 1 °F (36 2 °C), Max:98 5 °F (36 9 °C)    Temp:  [97 1 °F (36 2 °C)-98 5 °F (36 9 °C)] 97 1 °F (36 2 °C)  HR:  [69-90] 69  Resp:  [15-23] 16  BP: (117-136)/(59-78) 135/60  SpO2:  [95 %-99 %] 97 %  Body mass index is 29 84 kg/m²  Input and Output Summary (last 24 hours): Intake/Output Summary (Last 24 hours) at 6/16/2022 1445  Last data filed at 6/16/2022 1256  Gross per 24 hour   Intake 660 ml   Output 1800 ml   Net -1140 ml       Physical Exam:   Physical Exam  HENT:      Head: Normocephalic and atraumatic  Nose: Nose normal       Mouth/Throat:      Mouth: Mucous membranes are moist    Eyes:      Extraocular Movements: Extraocular movements intact  Conjunctiva/sclera: Conjunctivae normal    Cardiovascular:      Rate and Rhythm: Normal rate and regular rhythm  Pulmonary:      Effort: Pulmonary effort is normal       Breath sounds: Normal breath sounds  No wheezing or rales  Abdominal:      General: Bowel sounds are normal  There is no distension  Palpations: Abdomen is soft  Tenderness: There is no abdominal tenderness  Musculoskeletal:         General: Normal range of motion  Cervical back: Normal range of motion  Right lower leg: No edema  Left lower leg: No edema  Skin:     General: Skin is warm and dry  Neurological:      Mental Status: He is alert and oriented to person, place, and time           Additional Data:     Labs:  Results from last 7 days   Lab Units 06/16/22  1015 06/15/22  0530   WBC Thousand/uL 9 56 12 78*   HEMOGLOBIN g/dL 12 7 13 1   HEMATOCRIT % 40 1 39 9   PLATELETS Thousands/uL 165 224   NEUTROS PCT %  --  76*   LYMPHS PCT %  --  11*   MONOS PCT %  --  10   EOS PCT %  --  2     Results from last 7 days   Lab Units 06/16/22  1015 06/15/22  0530   SODIUM mmol/L 133* 135*   POTASSIUM mmol/L 4 4 4 5   CHLORIDE mmol/L 100 103   CO2 mmol/L 32 31   BUN mg/dL 24 37*   CREATININE mg/dL 1 29 1 54*   ANION GAP mmol/L 1* 1*   CALCIUM mg/dL 8 6 8 7   ALBUMIN g/dL  --  2 6*   TOTAL BILIRUBIN mg/dL  --  0 33   ALK PHOS U/L  --  103   ALT U/L  --  15   AST U/L  --  12   GLUCOSE RANDOM mg/dL 302* 203*         Results from last 7 days   Lab Units 06/16/22  1038 06/16/22  0722 06/15/22  2233 06/15/22  1628 06/15/22  1359 06/12/22  1121 06/12/22  0823 06/11/22  2119 06/11/22  1726 06/11/22  1556 06/11/22  1409 06/11/22  1229   POC GLUCOSE mg/dl 265* 229* 374* 398* 464* 293* 216* 231* 106 103 112 170*         Results from last 7 days   Lab Units 06/15/22  0608 06/09/22  2054 06/09/22  1816   LACTIC ACID mmol/L 1 4  --   --    PROCALCITONIN ng/ml  --  0 08 0 09       Lines/Drains:  Invasive Devices  Report    Peripheral Intravenous Line  Duration           Peripheral IV 06/15/22 Right Forearm 1 day                      Imaging: No pertinent imaging reviewed      Recent Cultures (last 7 days):   Results from last 7 days   Lab Units 06/15/22  0814 06/11/22  1355   SPUTUM CULTURE   --  3+ Growth of    GRAM STAIN RESULT   --  1+ Polys*  1+ Gram positive cocci in pairs and chains*  No Epithelial cells seen*   URINE CULTURE  >100,000 cfu/ml Gram Negative Jonny*  --        Last 24 Hours Medication List:   Current Facility-Administered Medications   Medication Dose Route Frequency Provider Last Rate    acetaminophen  975 mg Oral [facility] Trang Lateduar, DO      albuterol  2 5 mg Nebulization Q4H PRN Trang Lateduar, DO      aspirin  81 mg Oral Daily Earsondra Latch, DO      carvedilol  6 25 mg Oral BID With Meals Trang Ron, DO      cefTRIAXone  2,000 mg Intravenous Q24H Trang Latch, DO 2,000 mg (06/16/22 9172)    cyclobenzaprine  10 mg Oral BID Earvin Latch, DO      ferrous sulfate  325 mg Oral Daily With Breakfast Earsondra Ron, DO      fluticasone-vilanterol  1 puff Inhalation Daily Earsondra Lateduar, DO      furosemide  40 mg Oral Daily Earsondra Lateduar, DO      heparin (porcine)  5,000 Units Subcutaneous [facility] Earsondra Ron, DO      HYDROmorphone  0 2 mg Intravenous Q3H PRN Estrada Ream, DO      insulin lispro  1-5 Units Subcutaneous TID AC Trang Ron, DO      insulin lispro  1-5 Units Subcutaneous HS Trang Latch, DO      insulin regular  45 Units Subcutaneous TID [facility] Marvelyn Bamberger, DO      ipratropium  0 5 mg Nebulization TID Marvelyn Bamberger, DO      levalbuterol  1 25 mg Nebulization TID Marvelyn Bamberger, DO      lisinopril  10 mg Oral Daily Marvelyn Bamberger, DO      ondansetron  4 mg Intravenous Q6H PRN Marvelyn Bamberger, DO      oxyCODONE  5 mg Oral Q4H PRN Marvelyn Bamberger, DO      oxyCODONE  7 5 mg Oral Q4H PRN Chang Burks,       potassium chloride  20 mEq Oral Daily Marvelyn Bamberger, DO      pravastatin  80 mg Oral Daily With netFactor, DO      tamsulosin  0 4 mg Oral Daily With Deanne Roth PA-C          Today, Patient Was Seen By: Chang Burks DO    **Please Note: This note may have been constructed using a voice recognition system  **

## 2022-06-16 NOTE — PLAN OF CARE
Problem: PHYSICAL THERAPY ADULT  Goal: Performs mobility at highest level of function for planned discharge setting  See evaluation for individualized goals  Description: Treatment/Interventions: Functional transfer training, LE strengthening/ROM, Elevations, Therapeutic exercise, Endurance training, Patient/family training, Equipment eval/education, Bed mobility, Gait training  Equipment Recommended: Jason Henao (pt reports he owns RW)       See flowsheet documentation for full assessment, interventions and recommendations  Note: Prognosis: Good  Problem List: Decreased endurance, Impaired balance, Decreased mobility, Decreased safety awareness, Pain  Assessment: Pt is a 78 y o  male seen for PT evaluation s/p admit to [facility] on 6/15/2022  Pt was admitted with a primary dx of: Acute cystitis  PT now consulted for assessment of mobility and d/c needs  Pt with Up with assistance orders  Pts current comorbidities and personal factors effecting treatment include: COPD, CAD, CKD, Prostate ca, HTN, CHF, DM, Venous stasis B/L LE's  Pts current clinical presentation is Unstable/Unpredictable (high complexity) due to Ongoing medical management for primary dx, Increased reliance on more restrictive AD compared to baseline, Decreased activity tolerance compared to baseline, Fall risk, Increased assistance needed from caregiver at current time, Trending lab values  Prior to admission, pt was independent without AD  Upon evaluation, pt currently is requiring Supervision for transfers and Flory for ambulation 40 ft w/ RW  Pt presents at PT eval functioning below baseline and currently w/ overall mobility deficits 2* to: BLE weakness, impaired balance, decreased endurance, gait deviations, pain, decreased activity tolerance compared to baseline, decreased functional mobility tolerance compared to baseline, fall risk  Pt currently at a fall risk 2* to impairments listed above    Pt will continue to benefit from skilled acute PT interventions to address stated impairments; to maximize functional mobility; for ongoing pt/ family training; and DME needs  At conclusion of PT session pt returned back in chair and chair alarm engaged with phone and call bell within reach  Pt denies any further questions at this time  Recommend home with family care and HHPT upon hospital D/C  PT Discharge Recommendation: Home with home health rehabilitation          See flowsheet documentation for full assessment

## 2022-06-16 NOTE — OCCUPATIONAL THERAPY NOTE
Occupational Therapy Evaluation     Patient Name: Radha Tompkins  Today's Date: 6/16/2022  Problem List  Principal Problem:    Acute cystitis without hematuria  Active Problems:    Type 2 diabetes mellitus with hyperglycemia, with long-term current use of insulin (HCC)    Venous stasis dermatitis of both lower extremities    COPD, severe (HCC)    Chronic diastolic heart failure (HCC)    Essential hypertension    CAD (coronary artery disease)    Chronic respiratory failure with hypoxia (HCC)    Adenocarcinoma of lung (HCC)    CKD (chronic kidney disease) stage 3, GFR 30-59 ml/min (HCC)    Epididymitis, bilateral    Past Medical History  Past Medical History:   Diagnosis Date    Abdominal pain     MARANDA (acute kidney injury) ([facility] [address] ) 6/19/2018    Cardiac disease     CHF (congestive heart failure) (HCC)     COPD, severe (HCC)     Coronary artery disease     DDD (degenerative disc disease), lumbar 9/1/2020    Diabetes mellitus (HCC)     Dyspnea     GERD (gastroesophageal reflux disease)     Hyperlipidemia     Hypertension     MI (myocardial infarction) ([facility] [place] [address] )     with 3 stents    Nodule of apex of right lung     JACQUELINE (obstructive sleep apnea)     Prostate cancer [facility])      Past Surgical History  Past Surgical History:   Procedure Laterality Date    ABDOMINAL SURGERY      exploratory    ANGIOPLASTY      3 stents    APPENDECTOMY      COLONOSCOPY  2015    CT NEEDLE BIOPSY LUNG  11/3/2020    ESOPHAGOGASTRODUODENOSCOPY N/A 10/2/2017    Procedure: ESOPHAGOGASTRODUODENOSCOPY (EGD); Surgeon: Mitzi Ribeiro MD;  Location: [facility];   Service: Gastroenterology    IR THORACENTESIS  11/3/2020    KNEE CARTILAGE SURGERY      OTHER SURGICAL HISTORY      stent placement    PROSTATE SURGERY      SKIN GRAFT      Basal cell CA back           06/16/22 1031   OT Last Visit   OT Visit Date 06/16/22   Note Type   Note type Evaluation   Restrictions/Precautions   Weight Bearing Precautions Per Order No   Other Precautions Fall Risk;Pain Pain Assessment   Pain Assessment Tool 0-10   Pain Score 8   Pain Location/Orientation   (scrotum/groin)   Patient's Stated Pain Goal No pain   Hospital Pain Intervention(s) Repositioned; Ambulation/increased activity; Emotional support   Home Living   Type of [address] Two level;Bed/bath upstairs;1/2 bath on main level   Bathroom Shower/Tub Walk-in shower   Bathroom Toilet Raised   Bathroom Equipment Grab bars in shower;Grab bars around toilet;Commode   Bathroom Accessibility Accessible   Home Equipment Walker;Cane;Wheelchair-manual  (portable O2 concentrator)   Prior Function   Level of [place] Independent with ADLs and functional mobility   Lives With [employer] Help From Family   ADL Assistance Independent   IADLs Independent   Falls in the last 6 months 1 to 4  (2)   Vocational Retired   Lifestyle   Autonomy pta, pt was I W ADL/IADL, no AD fxnl mobility, +    Reciprocal Relationships supportive son who he lives with, can A as needed  Service to Others retired   Intrinsic Gratification watching TV   Psychosocial   Psychosocial (WDL) WDL   Subjective   Subjective "I am doing ok"   ADL   Where Assessed Edge of bed   Eating Assistance 6  Modified independent   Grooming Assistance 6  [address] 5  Supervision/Setup   LB R [address] 4  Minimal Assistance   Bed Mobility   Additional Comments found sitting EOB, left in chair w all needs in reach and chair alarm on  Transfers   Sit to Stand 5  Supervision   Additional items Increased time required   Stand to Sit 5  Supervision   Additional items Increased time required   Additional Comments initial STS performed w/o AD  G safety awareness, inc time 2' scrotal pain     Functional Mobility   Functional Mobility 4 Minimal assistance   Additional Comments c rw, VC for proper placement  Initial steps w/o RW, then utilized 2' scrotal pain  Additional items Rolling walker   Balance   Static Sitting Good   Dynamic Sitting Fair +   Static Standing Fair   Dynamic Standing Fair -   Ambulatory Poor +   Activity Tolerance   Activity Tolerance Patient tolerated treatment well   Medical Staff Made Aware DPT Jaqueline 2' pts med complexity, comorbidities and regression from baseline function  Nurse Made Aware ok per RN   RUE Assessment   RUE Assessment WFL   LUE Assessment   LUE Assessment WFL   Hand Function   Gross Motor Coordination Functional   Fine Motor Coordination Functional   Cognition   Overall Cognitive Status WFL   Arousal/Participation Alert; Responsive; Cooperative   Attention Within functional limits   Orientation Level Oriented X4   Memory Within functional limits   Following Commands Follows one step commands without difficulty   Comments pt pleasant and cooperative, G safety awareness and insight to condition  A bit limited 2' pain  Assessment   Limitation Decreased endurance;Decreased self-care trans;Decreased high-level ADLs   Prognosis Good   Assessment Pt is a 78 y o  male admitted 6/15/22 with SOB, scrotal swelling and pain  Pt final reason for admission is acute cystitis w/o hematuria  Pt w active OT eval and treat orders at this time  PMH includes  has a past medical history of Abdominal pain, MARANDA (acute kidney injury) ([address] ), Cardiac disease, CHF (congestive heart failure) ([address] ), COPD, severe ([address] ), Coronary artery disease, DDD (degenerative disc disease), lumbar, Diabetes mellitus ([address] ), Dyspnea, GERD (gastroesophageal reflux disease), Hyperlipidemia, Hypertension, MI (myocardial infarction) ([address] ), Nodule of apex of right lung, JACQUELINE (obstructive sleep apnea), and Prostate cancer ([address] )  Pt lives w son in a [facility], reports bed/bath upstairs, including walk in shower with grab bars, raised toilet w grab bars   Pt reports he has cane, RW and wheelchair at home, but was not using  Pta, pt was independent w/ ADL/IADL and functional mobility, was driving and was not using any DME at baseline  Currently, pt is S for UB ADL, min A for LB ADL and completed transfers w S, fxnl mobility with min A, RW for support  Pt is limited at this time 2* decreased endurance/activtiy tolerance, decreased ADL/High-level ADL status, decreased self-care trans, and is a fall risk  This impacts pt's ability to complete UB and LB dressing and bathing, toileting, transfers, functional mobility, community mobility, home and health maintenance, and safe engagement in typical daily routine  The patient's raw score on the AM-PAC Daily Activity inpatient short form is 21, standardized score is 44 27, greater than 39 4  Patients at this level are likely to benefit from discharge to home  Please refer to the recommendation of the Occupational Therapist for safe discharge planning  From OT standpoint, pt should D/C to STR when medically stable  Pt will benefit from continued acute OT services 3-5x/wk for 10-14 days to meet goals  Goals   Patient Goals to have less pain  LTG Time Frame 10-14   Long Term Goal #1 see below   Plan   Treatment Interventions ADL retraining;Functional transfer training; Endurance training;Energy conservation; Activityengagement;Equipment evaluation/education; Compensatory technique education   Goal Expiration Date 06/30/22   OT Frequency 2-3x/wk   Recommendation   OT Discharge Recommendation Home with home health rehabilitation   OT - OK to Discharge Yes   Additional Comments  pending continued progression/reduction in pain     AM-PAC Daily Activity Inpatient   Lower Body Dressing 3   Bathing 3   Toileting 3   Upper Body Dressing 4   Grooming 4   Eating 4   Daily Activity Raw Score 21   Daily Activity Standardized Score (Calc for Raw Score >=11) 44 27   AM-PAC Applied Cognition Inpatient   Following a Speech/Presentation 4 Understanding Ordinary Conversation 4   Taking Medications 4   Remembering Where Things Are Placed or Put Away 4   Remembering List of 4-5 Errands 4   Taking Care of Complicated Tasks 4   Applied Cognition Raw Score 24   Applied Cognition Standardized Score 62 21        Pt will complete functional mobility with Mod I using appropriate DME as needed  Pt will complete UB dressing and bathing with Mod I using appropriate DME as needed  Pt will complete LB dressing and bathing with Mod I using appropriate DME as needed  Pt will complete transfers with Mod I using appropriate DME as needed  Pt will complete toileting with Mod I using appropriate DME as needed  Pt will complete home maintenance task with Mod I using appropriate DME as needed  Pt will utilize energy conservation techniques throughout functional activity/ADL s/p skilled education  Pt will demonstrate increased safety awareness during functional tasks/ADL's s/p skilled education  Pt will increase activity tolerance to 30 minutes in order to complete ADL's/ functional tasks, using appropriate DME as needed         Johnnie Oswald, BURAK, OTR/L

## 2022-06-16 NOTE — PLAN OF CARE
Problem: OCCUPATIONAL THERAPY ADULT  Goal: Performs self-care activities at highest level of function for planned discharge setting  See evaluation for individualized goals  Description: Treatment Interventions: ADL retraining, Functional transfer training, Endurance training, Energy conservation, Activityengagement, Equipment evaluation/education, Compensatory technique education          See flowsheet documentation for full assessment, interventions and recommendations  Note: Limitation: Decreased endurance, Decreased self-care trans, Decreased high-level ADLs  Prognosis: Good  Assessment: Pt is a 78 y o  male admitted 6/15/22 with SOB, scrotal swelling and pain  Pt final reason for admission is acute cystitis w/o hematuria  Pt w active OT eval and treat orders at this time  PMH includes  has a past medical history of Abdominal pain, MARANDA (acute kidney injury) ([facility] [address] ), Cardiac disease, CHF (congestive heart failure) ([facility] [address] ), COPD, severe ([facility] [address] ), Coronary artery disease, DDD (degenerative disc disease), lumbar, Diabetes mellitus ([facility] [address] ), Dyspnea, GERD (gastroesophageal reflux disease), Hyperlipidemia, Hypertension, MI (myocardial infarction) ([facility] [address] ), Nodule of apex of right lung, JACQUELINE (obstructive sleep apnea), and Prostate cancer ([facility] [address] )  Pt lives w son in a [facility], reports bed/bath upstairs, including walk in shower with grab bars, raised toilet w grab bars  Pt reports he has cane, RW and wheelchair at home, but was not using  Pta, pt was independent w/ ADL/IADL and functional mobility, was driving and was not using any DME at baseline  Currently, pt is S for UB ADL, min A for LB ADL and completed transfers w S, fxnl mobility with min A, RW for support  Pt is limited at this time 2* decreased endurance/activtiy tolerance, decreased ADL/High-level ADL status, decreased self-care trans, and is a fall risk   This impacts pt's ability to complete UB and LB dressing and bathing, toileting, transfers, functional mobility, community mobility, home and health maintenance, and safe engagement in typical daily routine  The patient's raw score on the AM-PAC Daily Activity inpatient short form is 21, standardized score is 44 27, greater than 39 4  Patients at this level are likely to benefit from discharge to home  Please refer to the recommendation of the Occupational Therapist for safe discharge planning  From OT standpoint, pt should D/C to STR when medically stable  Pt will benefit from continued acute OT services 3-5x/wk for 10-14 days to meet goals  OT Discharge Recommendation: Home with home health rehabilitation  OT - OK to Discharge:  Yes

## 2022-06-16 NOTE — PHYSICAL THERAPY NOTE
Physical Therapy Evaluation    Patient's Name: Lamont Flores      Admitting Diagnosis  Cellulitis of abdominal wall [L03 311]  Epididymitis [N45 1]  Orchitis [N45 2]  UTI (urinary tract infection) [N39 0]  Epididymitis, bilateral [N45 1]  Scrotum pain [N50 82]    Problem List  Patient Active Problem List   Diagnosis    COPD exacerbation ([facility] )    HLD (hyperlipidemia)    Type 2 diabetes mellitus with hyperglycemia, with long-term current use of insulin ([facility])    Venous stasis dermatitis of both lower extremities    COPD, severe (HCC)    Pleural effusion on right    Chronic diastolic heart failure (HCC)    Essential hypertension    Diabetic neuropathy (HCC)    CAD (coronary artery disease)    RBBB    Oxygen dependent    COPD with acute exacerbation (HCC)    Pulmonary hypertension (HCC)    JACQUELINE (obstructive sleep apnea)    Lung nodule    DDD (degenerative disc disease), lumbar    Anemia, iron deficiency    PAD (peripheral artery disease) ([facility])    Chronic respiratory failure with hypoxia (HCC)    Fracture of navicular bone of left foot    Adenocarcinoma of lung (HCC)    History of prostate cancer    CKD (chronic kidney disease) stage 3, GFR 30-59 ml/min ([facility])    Shortness of breath    Chronic bilateral low back pain without sciatica    Acute respiratory failure with hypoxia (HCC)    Epididymitis, bilateral    Acute cystitis without hematuria       Past Medical History  Past Medical History:   Diagnosis Date    Abdominal pain     MARANDA (acute kidney injury) ([place] [id number] ) 6/19/2018    Cardiac disease     CHF (congestive heart failure) ([facility])     COPD, severe ([facility] )     Coronary artery disease     DDD (degenerative disc disease), lumbar 9/1/2020    Diabetes mellitus (HCC)     Dyspnea     GERD (gastroesophageal reflux disease)     Hyperlipidemia     Hypertension     MI (myocardial infarction) ([facility] )     with 3 stents    Nodule of apex of right lung     JACQUELINE (obstructive sleep apnea)     Prostate cancer [facility])        Past Surgical History  Past Surgical History:   Procedure Laterality Date    ABDOMINAL SURGERY      exploratory    ANGIOPLASTY      3 stents    APPENDECTOMY      COLONOSCOPY  2015    CT NEEDLE BIOPSY LUNG  11/3/2020    ESOPHAGOGASTRODUODENOSCOPY N/A 10/2/2017    Procedure: ESOPHAGOGASTRODUODENOSCOPY (EGD); Surgeon: Wilver Elliott MD;  Location:  GI LAB; Service: Gastroenterology    IR THORACENTESIS  11/3/2020    KNEE CARTILAGE SURGERY      OTHER SURGICAL HISTORY      stent placement    PROSTATE SURGERY      SKIN GRAFT      Basal cell CA back        06/16/22 1032   PT Last Visit   PT Visit Date 06/16/22   Note Type   Note type Evaluation   Pain Assessment   Pain Assessment Tool 0-10   Pain Score 8   Pain Location/Orientation Orientation: Bilateral;[facility]   Hospital Pain Intervention(s) Repositioned; Ambulation/increased activity   Restrictions/Precautions   Weight Bearing Precautions Per Order No   Other Precautions Cognitive; Chair Alarm; Fall Risk;Pain   Home Living   Type of [address] [place] [address] Two level;Bed/bath upstairs;1/2 bath on main level   Home Equipment Walker;Cane   Prior Function   Level of [place] Independent with ADLs and functional mobility   Lives With Son   Receives Help From [facility] in the last 6 months 1 to 4  (2)   Comments Pt reports no AD PTA, son able to provide limited assistance upon discharge   General   Family/Caregiver Present No   Cognition   Overall Cognitive Status WFL   Attention Attends with cues to redirect   Orientation Level Oriented X4   Following Commands Follows one step commands with increased time or repetition   Comments Pt cooperative, impulsive at times, cues for safety   RLE Assessment   RLE Assessment WFL  (Grossly 4/5)   LLE Assessment   LLE Assessment WFL  (Grossly 4/5)   Light Touch   RLE Light Touch Grossly intact   LLE Light Touch Grossly intact   Bed Mobility   Additional Comments Pt sitting EOB upon PT arrival   Transfers   Sit to Stand 5  Supervision Stand to Sit 5  Supervision   Additional Comments no AD, pain into standing   Ambulation/Elevation   Gait pattern Decreased foot clearance; Forward Flexion; Antalgic; Short stride   Gait Assistance 4  Minimal assist   Additional items Assist x 1   Assistive Device Rolling walker   Distance 40 ft, limited by pain   Balance   Static Sitting Good   Dynamic Sitting Fair +   Static Standing Fair   Dynamic Standing Fair -   Ambulatory Poor +   Activity Tolerance   Activity Tolerance Patient limited by pain   Nurse Made Aware RN updated  Chair alarm engaged at end of session   Assessment   Prognosis Good   Problem List Decreased endurance; Impaired balance;Decreased mobility; Decreased safety awareness;Pain   Assessment Pt is a 78 y o  male seen for PT evaluation s/p admit to [facility] on 6/15/2022  Pt was admitted with a primary dx of: Acute cystitis  PT now consulted for assessment of mobility and d/c needs  Pt with Up with assistance orders  Pts current comorbidities and personal factors effecting treatment include: COPD, CAD, CKD, Prostate ca, HTN, CHF, DM, Venous stasis B/L LE's  Pts current clinical presentation is Unstable/Unpredictable (high complexity) due to Ongoing medical management for primary dx, Increased reliance on more restrictive AD compared to baseline, Decreased activity tolerance compared to baseline, Fall risk, Increased assistance needed from caregiver at current time, Trending lab values  Prior to admission, pt was independent without AD  Upon evaluation, pt currently is requiring Supervision for transfers and Flory for ambulation 40 ft w/ RW  Pt presents at PT eval functioning below baseline and currently w/ overall mobility deficits 2* to: BLE weakness, impaired balance, decreased endurance, gait deviations, pain, decreased activity tolerance compared to baseline, decreased functional mobility tolerance compared to baseline, fall risk   Pt currently at a fall risk 2* to impairments listed above   Pt will continue to benefit from skilled acute PT interventions to address stated impairments; to maximize functional mobility; for ongoing pt/ family training; and DME needs  At conclusion of PT session pt returned back in chair and chair alarm engaged with phone and call bell within reach  Pt denies any further questions at this time  Recommend home with family care and HHPT upon hospital D/C  Goals   Patient Goals to decrease pain   STG Expiration Date 06/30/22   Short Term Goal #1 In 14 days pt will be able to: 1  Demonstrate ability to perform all aspects of bed mobility independently to improve functional safety  2  Perform functional transfers with LRAD independently to facilitate safe return to previous living environment  3   Ambulate 150 ft with LRAD independently with stable vitals to improve safety with household distances and reduce fall risk  4  Improve LE strength grades by 1 to increase ease of functional mobility with transfers and gait  5  Pt will demonstrate improved balance by one grade in order to decrease risk of falls  6  Climb 12 steps with 1 HR and supervision to simulate home  PT Treatment Day 0   Plan   Treatment/Interventions Functional transfer training;LE strengthening/ROM; Elevations; Therapeutic exercise; Endurance training;Patient/family training;Equipment eval/education; Bed mobility;Gait training   PT Frequency 3-5x/wk   Recommendation   PT Discharge Recommendation Home with home health rehabilitation   Equipment Recommended William Paget  (pt reports he owns RW)   [address]   Turning in Bed Without Bedrails 4   Lying on Back to Sitting on Edge of Flat Bed 4   Moving Bed to Chair 3   Standing Up From Chair 3   Walk in Room 3   Climb 3-5 Stairs 2   Basic Mobility Inpatient Raw Score 19   Basic Mobility Standardized Score 42 48   Highest Level Of Mobility   JH-HLM Goal 6: Walk 10 steps or more   JH-HLM Achieved 7: Walk 25 feet or more       Deandre Singer PT, DPT, GCS

## 2022-06-16 NOTE — UTILIZATION REVIEW
Initial Clinical Review    Admission: Date/Time/Statement:   Admission Orders (From admission, onward)     Ordered        06/15/22 1202  Inpatient Admission  Once                      Orders Placed This Encounter   Procedures    Inpatient Admission     Standing Status:   Standing     Number of Occurrences:   1     Order Specific Question:   Level of Care     Answer:   Med Surg [16]     Order Specific Question:   Estimated length of stay     Answer:   More than 2 Midnights     Order Specific Question:   Certification     Answer:   I certify that inpatient services are medically necessary for this patient for a duration of greater than two midnights  See H&P and MD Progress Notes for additional information about the patient's course of treatment  ED Arrival Information     Expected   -    Arrival   6/15/2022 04:55    Acuity   Less Urgent            Means of arrival   Wheelchair    Escorted by   Self    Service   Hospitalist    Admission type   Urgent            Arrival complaint   scrotum pain           Chief Complaint   Patient presents with    Medical Problem     Pt states his scrotum is swollen for the past few days       Initial Presentation: 78 y o  male with PMH of lung adenocarcinoma , CAD status post stenting , severe COPD, chronic hypoxic respiratory failure on home oxygen, CKD stage 3, venous stasis dermatitis and uncontrolled diabetes mellitus type 2 on insulin presents to [place] [facility] via personal vehicle with c/o progressive scrotal pain and swelling over the past 2 days  Recent admission for SOB and COPD exacerbation, dc'd on 6/12/22  In ED,  Abnormal UA, CT AP revealed cellulitis in the area of the mons pubis, BL epididymitis and orchitis on scrotal US  IV ABX started in ED    Admit Inpatient med surg d/t  BL epididymitis, chronic respiratory failure w/ hypoxia:  Continue IV ABX, urology consult, f//u on urine cx, pain control, supplemental O2 prn baseline 2-3 L NC, continue nebs and inhalers and home meds  Monitor blood sugars and continue home insulin dose  6/15 Urology Consult:  Acute cystitis without hematuria:  Bladder scan, PVR, daily scrotal exams, continue IV ABX, f/u on urine cx, pain control, scrotal elevation, support, ice for comfort  Restart flomax  Date: 6/16   Day 2:   Continues w/ severe scrotal pain  Continue IV ABX, f/u on urine cx, pain control w/ increased oxycodone to 7 5 mg prn, continue to monitor CKD, baseline Cr 1 2-1 4  Continue supplemental O2  Blood sugars high, consult endocrinology, continue accuchecks  Continue home meds  ED Triage Vitals   Temperature Pulse Respirations Blood Pressure SpO2   06/15/22 0508 06/15/22 0508 06/15/22 0508 06/15/22 0508 06/15/22 0508   97 7 °F (36 5 °C) 55 18 149/67 96 %      Temp Source Heart Rate Source Patient Position - Orthostatic VS BP Location FiO2 (%)   06/15/22 0508 06/15/22 0904 -- 06/15/22 0904 --   Oral Monitor  Left arm       Pain Score       06/15/22 0530       10 - Worst Possible Pain          Wt Readings from Last 1 Encounters:   06/15/22 99 8 kg (220 lb)     Additional Vital Signs:   Date/Time Temp Pulse Resp BP MAP (mmHg) SpO2 O2 Device   06/16/22 07:43:34 -- 69 16 135/60 85 99 % --   06/15/22 22:54:32 97 1 °F (36 2 °C) Abnormal  73 23 Abnormal  136/59 85 96 % --   06/15/22 2030 -- -- -- -- -- 96 % --   06/15/22 1627 98 5 °F (36 9 °C) 85 -- 133/60 84 95 % --   06/15/22 1600 -- -- -- -- -- -- None (Room air)   06/15/22 1510 98 4 °F (36 9 °C) 90 15 117/78 91 95 % --   06/15/22 0904 -- 92 18 132/60 -- 95 % None (Room air)   06/15/22 0508 97 7 °F (36 5 °C) 55 18 149/67 -- 96 % None (Room air)       Pertinent Labs/Diagnostic Test Results:   US scrotum and testicles   Final Result by Keron Terrell MD (06/15 1270)      1  Bilateral epididymitis and left-sided orchitis  2   Complex left hydrocele possibly reactive related to adjacent inflammation/infection    Recommend follow-up imaging to assess for resolution as blood products could also be present  CT abdomen pelvis wo contrast   Final Result by Eh Chau MD (06/15 0840)      1  Findings most likely representing cellulitis in the area of the mons pubis  No abscess or gas within the soft tissues  2   small right pleural effusion and right lower lobe rounded atelectasis       Results from last 7 days   Lab Units 06/09/22  1816   SARS-COV-2  Negative     Results from last 7 days   Lab Units 06/16/22  1015 06/15/22  0530 06/12/22  0513 06/11/22  0540 06/10/22  0622   WBC Thousand/uL 9 56 12 78* 14 73* 16 19* 10 63*   HEMOGLOBIN g/dL 12 7 13 1 11 8* 12 4 12 6   HEMATOCRIT % 40 1 39 9 38 2 40 1 39 0   PLATELETS Thousands/uL 165 224 145* 171 152   NEUTROS ABS Thousands/µL  --  9 92* 12 14* 13 52* 9 68*     Results from last 7 days   Lab Units 06/15/22  0530 06/12/22  0513 06/11/22  0540 06/10/22  0557 06/09/22  1816   SODIUM mmol/L 135* 138 138 137 134*   POTASSIUM mmol/L 4 5 4 2 4 0 4 5 4 8   CHLORIDE mmol/L 103 100 101 103 98*   CO2 mmol/L 31 32 31 27 29   ANION GAP mmol/L 1* 6 6 7 7   BUN mg/dL 37* 25 28* 28* 23   CREATININE mg/dL 1 54* 1 21 1 11 1 26 1 24   EGFR ml/min/1 73sq m 43 58 65 55 56   CALCIUM mg/dL 8 7 8 7 9 0 8 9 9 2   MAGNESIUM mg/dL  --   --   --  2 4  --    PHOSPHORUS mg/dL  --   --   --  3 1  --      Results from last 7 days   Lab Units 06/15/22  0530 06/09/22  1816   AST U/L 12 10   ALT U/L 15 18   ALK PHOS U/L 103 97   TOTAL PROTEIN g/dL 7 9 7 7   ALBUMIN g/dL 2 6* 3 1*   TOTAL BILIRUBIN mg/dL 0 33 0 46     Results from last 7 days   Lab Units 06/16/22  0722 06/15/22  2233 06/15/22  1628 06/15/22  1359 06/12/22  1121 06/12/22  0823 06/11/22  2119 06/11/22  1726 06/11/22  1556 06/11/22  1409 06/11/22  1229 06/11/22  1011   POC GLUCOSE mg/dl 229* 374* 398* 464* 293* 216* 231* 106 103 112 170* 342*     Results from last 7 days   Lab Units 06/15/22  0530 06/12/22  0513 06/11/22  0540 06/10/22  0557 06/09/22  1816   GLUCOSE RANDOM mg/dL 203* 123 103 243* 275*     BETA-HYDROXYBUTYRATE   Date Value Ref Range Status   06/15/2019 0 2 <0 6 mmol/L Final      Results from last 7 days   Lab Units 06/10/22  1036 06/10/22  0822 06/10/22  0618 06/09/22 2054 06/09/22  1816   HS TNI 0HR ng/L  --   --  10  --  14   HS TNI 2HR ng/L  --  11  --  12  --    HSTNI D2 ng/L  --  1  --  -2  --    HS TNI 4HR ng/L 9  --   --   --   --    HSTNI D4 ng/L -1  --   --   --   --      Results from last 7 days   Lab Units 06/09/22 2054 06/09/22  1816   PROCALCITONIN ng/ml 0 08 0 09     Results from last 7 days   Lab Units 06/15/22  0608   LACTIC ACID mmol/L 1 4     Results from last 7 days   Lab Units 06/09/22  1816   NT-PRO BNP pg/mL 996*     Results from last 7 days   Lab Units 06/15/22  0530   LIPASE u/L 147     Results from last 7 days   Lab Units 06/15/22  0814   CLARITY UA  Turbid   COLOR UA  Light Yellow   SPEC GRAV UA  1 014   PH UA  5 5   GLUCOSE UA mg/dl 70 (7/100%)*   KETONES UA mg/dl Negative   BLOOD UA  Small*   PROTEIN UA mg/dl 70 (1+)*   NITRITE UA  Positive*   BILIRUBIN UA  Negative   UROBILINOGEN UA (BE) mg/dl <2 0   LEUKOCYTES UA  Large*   WBC UA /hpf Innumerable*   RBC UA /hpf 4-10*   BACTERIA UA /hpf Innumerable*   EPITHELIAL CELLS WET PREP /hpf Occasional     Results from last 7 days   Lab Units 06/09/22  1816   INFLUENZA A PCR  Negative   INFLUENZA B PCR  Negative   RSV PCR  Negative     Results from last 7 days   Lab Units 06/15/22  0814 06/11/22  1355   SPUTUM CULTURE   --  3+ Growth of    GRAM STAIN RESULT   --  1+ Polys*  1+ Gram positive cocci in pairs and chains*  No Epithelial cells seen*   URINE CULTURE  >100,000 cfu/ml Gram Negative Jonny*  --      ED Treatment:   Medication Administration from 06/15/2022 0454 to 06/15/2022 1504       Date/Time Order Dose Route Action     06/15/2022 0530 fentanyl citrate (PF) 100 MCG/2ML 50 mcg 50 mcg Intravenous Given     06/15/2022 1273 cefTRIAXone (ROCEPHIN) 2,000 mg in dextrose 5 % 50 mL IVPB 2,000 mg Intravenous New Bag     06/15/2022 1422 insulin lispro (HumaLOG) 100 units/mL subcutaneous injection 1-5 Units 5 Units Subcutaneous Given        Past Medical History:   Diagnosis Date    Abdominal pain     MARANDA (acute kidney injury) ([place] [address] ) 6/19/2018    Cardiac disease     [facility]congestive [facility] failure) [facility])     COPD, severe (HCC)     Coronary artery disease     DDD (degenerative disc disease), lumbar 9/1/2020    Diabetes mellitus ([place] [address] )     Dyspnea     GERD (gastroesophageal reflux disease)     Hyperlipidemia     Hypertension     MI (myocardial infarction) ([place] [address] )     with 3 stents    Nodule of apex of right lung     JACQUELINE (obstructive sleep apnea)     Prostate cancer ([facility])      Present on Admission:   Adenocarcinoma of lung ([place] [address] )   CAD (coronary artery disease)   Chronic respiratory failure with hypoxia ([facility])   COPD, severe ([facility])   Chronic diastolic heart failure ([facility])   CKD (chronic kidney disease) stage 3, GFR 30-59 ml/min ([facility])   Essential hypertension   Venous stasis dermatitis of both lower extremities      Admitting Diagnosis: Cellulitis of abdominal wall [L03 311]  Epididymitis [N45 1]  Orchitis [N45 2]  UTI (urinary tract infection) [N39 0]  Epididymitis, bilateral [N45 1]  Scrotum pain [N50 82]  Age/Sex: 78 y o  male  Admission Orders:  Scheduled Medications:  acetaminophen, 975 mg, Oral, Q8H [facility]  aspirin, 81 mg, Oral, Daily  carvedilol, 6 25 mg, Oral, BID With Meals  cefTRIAXone, 2,000 mg, Intravenous, Q24H  cyclobenzaprine, 10 mg, Oral, BID  ferrous sulfate, 325 mg, Oral, Daily With Breakfast  fluticasone-vilanterol, 1 puff, Inhalation, Daily  furosemide, 40 mg, Oral, Daily  heparin (porcine), 5,000 Units, Subcutaneous, Q8H DRAKE  insulin lispro, 1-5 Units, Subcutaneous, TID AC  insulin lispro, 1-5 Units, Subcutaneous, HS  insulin regular, 45 Units, Subcutaneous, TID AC  ipratropium, 0 5 mg, Nebulization, TID  levalbuterol, 1 25 mg, Nebulization, TID  lisinopril, 10 mg, Oral, Daily  potassium chloride, 20 mEq, Oral, Daily  pravastatin, 80 mg, Oral, Daily With Dinner  tamsulosin, 0 4 mg, Oral, Daily With Dinner      Continuous IV Infusions: none     PRN Meds:  albuterol, 2 5 mg, Nebulization, Q4H PRN  ondansetron, 4 mg, Intravenous, Q6H PRN  oxyCODONE, 5 mg, Oral, Q4H PRN x2 thus far    scd  Measure PVR    IP CONSULT TO UROLOGY  IP CONSULT TO ENDOCRINOLOGY    Network Utilization Review Department  ATTENTION: Please call with any questions or concerns to [phone] and carefully listen to the prompts so that you are directed to the right person  All voicemails are confidential   Jerry Palumbo all requests for admission clinical reviews, approved or denied determinations and any other requests to dedicated fax number below belonging to the campus where the patient is receiving treatment   List of dedicated fax numbers for the Facilities:  [address] DENIALS (Administrative/Medical Necessity) [phone]   [address] (Maternity/NICU/Pediatrics) [phone]   [address]  [address] [place] [address] [address] [place] [address] [address] [facility] [place] [address] [phone]

## 2022-06-16 NOTE — ASSESSMENT & PLAN NOTE
Lab Results   Component Value Date    HGBA1C 9 2 (H) 04/22/2022       Recent Labs     06/15/22  1628 06/15/22  2233 06/16/22  0722 06/16/22  1038   POCGLU 398* 374* 229* 265*       Blood Sugar Average: Last 72 hrs:  (P) 346   Continue U500  Monitor blood sugar  BSs high, will consult endo to help maange these BSs

## 2022-06-16 NOTE — ASSESSMENT & PLAN NOTE
Wt Readings from Last 3 Encounters:   06/15/22 99 8 kg (220 lb)   06/09/22 99 8 kg (220 lb)   05/17/22 98 9 kg (218 lb)     Continue home dose Lasix

## 2022-06-16 NOTE — ASSESSMENT & PLAN NOTE
Patient presented with progressive scrotal pain for the past 2 days  Ultrasound with  Bilateral epididymitis and left-sided orchitis  Complex left hydrocele possibly reactive related to adjacent inflammation/infection  CT scan with cellulitis in the area of the mons pubis  Abnormal urinalysis  Patient afebrile with leukocytosis  Continue with IV ceftriaxone  Consult urology for further management - appreciated  Follow on urine culture  Pain control and supportive care - Increase oxycodone    Can trial 10 mg if needed

## 2022-06-16 NOTE — PROGRESS NOTES
Progress Note - Urology  Josefina Brumfield Sr  1945, 78 y o  male MRN: [id number]    Unit/Bed#: [facility] 827-01 Encounter: [id number]    * Acute cystitis without hematuria  Assessment & Plan  Assessment  BPH  UTI  Epididymorchitis L>R secondary to above  Prostate cancer s/p brachytherapy and EBRT around 2010    Plan  CT a/p and scrotal US reviewed  Bladder scan/PVR to ensure adequate emptying  Continue daily scrotal exams  Ceftriaxone, urine c&s gram-negative johnathan, sensitivities pending  Analgesia prn  Scrotal elevation/support/ice for comfort  Flomax 0 4mg qhs restarted          Subjective:  Resting in bed  Reports no voiding difficulty  Has ongoing bilateral testicular discomfort which is not improved  Denies fevers chills  Eating and drinking okay  He has a history of prostate cancer treated with brachy seed implantation external beam boost radiation about 10 years ago in [place]  His urologist is Dr Alexander Zhao  Patient reports undetectable PSA since radiation completed  Last PSA I can find is 2018  Was on flomax few years ago but not recently, helped him in the past he is not sure why he stopped it  24 HR EVENTS:   no significant events  Patient has  complaints of Bilateral testicular pain  HPI:     Review of Systems   Constitutional: Negative for activity change, appetite change, chills, fatigue, fever and unexpected weight change  HENT: Negative for facial swelling  Eyes: Negative for discharge  Respiratory: Negative  Negative for cough and shortness of breath  Cardiovascular: Negative for chest pain and leg swelling  Gastrointestinal: Negative  Negative for abdominal distention, abdominal pain, constipation, diarrhea, nausea and vomiting  Endocrine: Negative  Genitourinary: Positive for scrotal swelling, testicular pain and urgency  Negative for decreased urine volume, difficulty urinating, dysuria, enuresis, flank pain, frequency, genital sores and hematuria  Musculoskeletal: Negative for back pain and myalgias  Skin: Negative for pallor and rash  Allergic/Immunologic: Negative  Negative for immunocompromised state  Neurological: Negative for facial asymmetry and speech difficulty  Psychiatric/Behavioral: Negative for agitation and confusion  Objective:  Nursing Rounds:   Vitals: Blood pressure 102/64, pulse 85, temperature 98 4 °F (36 9 °C), resp  rate 16, height 6' (1 829 m), weight 99 8 kg (220 lb), SpO2 96 %  ,Body mass index is 29 84 kg/m²  INS & OUTS:  I/O last 24 hours: In: 80 [P O :660; IV Piggyback:50]  Out: 7875 [Urine:2375]    Physical Exam  Vitals and nursing note reviewed  Constitutional:       General: He is not in acute distress  Appearance: Normal appearance  He is normal weight  He is not ill-appearing, toxic-appearing or diaphoretic  HENT:      Head: Normocephalic  Pulmonary:      Effort: Pulmonary effort is normal  No respiratory distress  Abdominal:      General: Abdomen is flat  There is no distension  Tenderness: There is no abdominal tenderness  There is no guarding or rebound  Genitourinary:     Penis: Circumcised  No hypospadias, erythema, tenderness, discharge, swelling or lesions  Testes:         Right: Tenderness and swelling present  Left: Tenderness and swelling present  Epididymis:      Right: Tenderness present  Left: Tenderness present  Comments: Not tolerating exam well  Left testicle firm and tender > right  No crepitus fluctuance or significant erythema  Normal skin nontender perineum and thighs  Musculoskeletal:         General: No swelling  Cervical back: Normal range of motion  Skin:     General: Skin is warm and dry  Neurological:      General: No focal deficit present  Mental Status: He is alert and oriented to person, place, and time     Psychiatric:         Mood and Affect: Mood normal          Behavior: Behavior normal          Thought Content: Thought content normal          Judgment: Judgment normal            Imaging:  SCROTAL ULTRASOUND     INDICATION:    testicular pain      COMPARISON: 1/21/2022; 6/15/2022     TECHNIQUE:   Ultrasound the scrotal contents was performed with a high frequency linear transducer utilizing volumetric sweep imaging as well as standard still image techniques  Imaging performed in longitudinal and transverse orientation  Color and   spectral Doppler evaluation also performed bilaterally      FINDINGS:     TESTES:   Testes are symmetric and normal in size      RIGHT testis = 2 6 x 1 6 x 2 1 cm  Volume 4 5 mL  Normal contour with homogeneous smooth echotexture  No intratesticular mass lesion or calcifications      LEFT testis = 2 6 x 2 3 x 1 9 cm  Volume 6 0 mL  There is marked hyperemia of the left testis compared to the right  Echotexture is normal   No intratesticular mass lesion or calcifications      Doppler flow within both testes is present and symmetric      EPIDIDYMIDES:   Marked enlargement of both epididymides  Bilateral epididymal hyperemia  No epididymal lesions      HYDROCELE:  Complex moderate-sized left-sided hydrocele with internal septations and echogenic elements      VARICOCELE:  None present      SCROTUM:  Scrotal thickness and appearance within normal limits  No evidence for extratesticular mass or hernia demonstrated      IMPRESSION:        1   Bilateral epididymitis and left-sided orchitis  2   Complex left hydrocele possibly reactive related to adjacent inflammation/infection  Recommend follow-up imaging to assess for resolution as blood products could also be present       Imaging reviewed - both report and images personally reviewed     CT ABDOMEN AND PELVIS WITHOUT IV CONTRAST     INDICATION:   suprapubic swelling, testicular swelling      COMPARISON:  Most recent prior exam is a CT scan dated March 29, 2022      TECHNIQUE:  CT examination of the abdomen and pelvis was performed without intravenous contrast  This examination was performed without intravenous contrast in the context of the critical nationwide Omnipaque shortage  Axial, sagittal, and coronal 2D   reformatted images were created from the source data and submitted for interpretation       Radiation dose length product (DLP) for this visit:  1057 44 mGy-cm   This examination, like all CT scans performed in the [facility], was performed utilizing techniques to minimize radiation dose exposure, including the use of   iterative reconstruction and automated exposure control       Enteric contrast was not administered       FINDINGS:     ABDOMEN     LOWER CHEST:  Volume loss with shift of the mediastinum to the right, small right pleural effusion and rounded atelectasis in the right base      LIVER/BILIARY TREE:  Unremarkable      GALLBLADDER:  No calcified gallstones  No pericholecystic inflammatory change      SPLEEN:  Unremarkable      PANCREAS:  Unremarkable      ADRENAL GLANDS:  Unremarkable      KIDNEYS/URETERS:  Nonspecific bilateral perinephric edema      STOMACH AND BOWEL:  Scattered colonic diverticula but no evidence of acute diverticulitis  Large amount of formed stool throughout the colon  No evidence of bowel obstruction      APPENDIX:  No findings to suggest appendicitis      ABDOMINOPELVIC CAVITY:  No ascites  No pneumoperitoneum  No lymphadenopathy      VESSELS:  Atherosclerotic changes are present  No evidence of aneurysm      PELVIS     REPRODUCTIVE ORGANS:  Multiple brachytherapy seeds are in the prostate gland      URINARY BLADDER:  Unremarkable      ABDOMINAL WALL/INGUINAL REGIONS:  Infiltration of the subcutaneous fat over the region of the mons pubis and skin thickening  There is no abscess or gas within the soft tissues      Small fat-containing umbilical hernia    Subcutaneous nodule in the left abdominal wall just below the umbilicus with surrounding inflammatory fat stranding and skin thickening  No abscess      Left inguinal canal lipoma      OSSEOUS STRUCTURES:  No acute fracture or destructive osseous lesion  Spinal degenerative changes are noted      IMPRESSION:        1  Findings most likely representing cellulitis in the area of the mons pubis  No abscess or gas within the soft tissues  2   small right pleural effusion and right lower lobe rounded atelectasis      The study was marked in EPIC for immediate notification  Labs:  Recent Labs     06/15/22  0530 06/16/22  1015   WBC 12 78* 9 56       Recent Labs     06/15/22  0530 06/16/22  1015   HGB 13 1 12 7     Recent Labs     06/15/22  0530 06/16/22  1015   HCT 39 9 40 1     Recent Labs     06/15/22  0530 06/16/22  1015   CREATININE 1 54* 1 29     Urine Culture: Growth: Gram-negative johnathan and Sensitivities Pending    History:    Past Medical History:   Diagnosis Date    Abdominal pain     [facility]ANDA [facility]) ([facility] ) 6/19/2018    Cardiac disease     [facility]     COPD, severe (HCC)     Coronary artery disease     DDD (degenerative disc disease), lumbar 9/1/2020    Diabetes mellitus ([facility] )     Dyspnea     GERD (gastroesophageal reflux disease)     Hyperlipidemia     Hypertension     MI (myocardial infarction) ([facility] )     with 3 stents    Nodule of apex of right lung     JACQUELINE (obstructive sleep apnea)     Prostate cancer [facility])      Past Surgical History:   Procedure Laterality Date    ABDOMINAL SURGERY      exploratory    ANGIOPLASTY      3 stents    APPENDECTOMY      COLONOSCOPY  2015    CT NEEDLE BIOPSY LUNG  11/3/2020    ESOPHAGOGASTRODUODENOSCOPY N/A 10/2/2017    Procedure: ESOPHAGOGASTRODUODENOSCOPY (EGD); Surgeon: Michael Finch MD;  [facility]: [facility];   Service: Gastroenterology    IR THORACENTESIS  11/3/2020    KNEE CARTILAGE SURGERY      OTHER SURGICAL HISTORY      stent placement    PROSTATE SURGERY      SKIN GRAFT      Basal cell CA back     Family History   Problem Relation Age of Onset    Heart disease Father     Other Father         Mesothelioma      Social History     Socioeconomic History    Marital status:      Spouse name: None    Number of children: 1    Years of education: 8    Highest education level: None   Occupational History    None   Tobacco Use    Smoking status: Former Smoker     Packs/day: 1 50     Years: 50 00     Pack years: 75 00     Start date: 38     Quit date: 2020     Years since quittin 9    Smokeless tobacco: Never Used   Vaping Use    Vaping Use: Never used   Substance and Sexual Activity    Alcohol use: Never    Drug use: No    Sexual activity: Not Currently     Partners: Female   Other Topics Concern    None   Social History Narrative    Most recent tobacco use screenin2018    Do you currently or have you served in the [employer]: No    Were you activated, into active duty, as a member of the [employer] or as a Reservist: No    Caffeine intake: Moderate    Alcohol intake: None    Marital status:     Number of children: 1    Education: 8    Occupation: retired    Sexual orientation: Heterosexual    General stress level: Medium    Live alone or with others: with others    Exercise level: None    Sexually active: No    Overweight: Yes    Obese: Yes    Guns present in home: No    Seat belts used routinely: Yes    Advance directive: No    Sunscreen used routinely: No    Smoke alarm in home: Yes    Legally blind in one or both eyes: No    Hard of hearing or deaf in one or both ears: No     Social Determinants of Health     Financial Resource Strain: Not on file   Food Insecurity: No Food Insecurity    Worried About Running Out of Food in the Last Year: Never true    Kenyatta of Food in the Last Year: Never true   Transportation Needs: No Transportation Needs    Lack of Transportation (Medical): No    Lack of Transportation (Non-Medical):  No   Physical Activity: Not on file   Stress: Not on file Social Connections: Not on file   Intimate Partner Violence: Not on file   Housing Stability: [address] Unable to Pay for Housing in the Last Year: No    Number of [place] in the Last Year: 1    Unstable Housing in the Last Year: No       YOLANDE Gibbs  Date: 6/16/2022 Time: 5:58 PM

## 2022-06-16 NOTE — ASSESSMENT & PLAN NOTE
Lab Results   Component Value Date    EGFR 54 06/16/2022    EGFR 43 06/15/2022    EGFR 58 06/12/2022    CREATININE 1 29 06/16/2022    CREATININE 1 54 (H) 06/15/2022    CREATININE 1 21 06/12/2022   Estimated Creatinine Clearance: 61 5 mL/min (by C-G formula based on SCr of 1 29 mg/dL)    Baseline appears around 1 2-1 4  Monitor

## 2022-06-16 NOTE — CONSULTS
Consultation - Lamont Flores  78 y o  male MRN: [id number]    Unit/Bed#: [facility] 827-01 Encounter: [id number]    Assessment/Plan     Assessment: This is a 78y o -year-old male with type 2 DM admitted for bilateral epididymitis and L sided orchitis  Plan:  Type 2 DM  Most recent a1c 9 2  Home regimen: u-500 45/45/35 before meals  Inpatient regimen: 45 units TID with meals u-500  Will adjust regimen to 40 units u-500 q6h starting at 6pm and algorithm 5 humalog q6h for correctional coverage  Continue to monitor blood glucoses and make adjustments as needed over time  Bilateral epididymitis, left sided orchitis  Management as per primary team    CC: Diabetes Consult    History of Present Illness     HPI: Ceci Monson Sr  is a 78y o  year old male with type 2 DM for 6-7 years admitted to the hospital with scrotal pain and found to have bilateral epididymitis, left sided orchitis  He was recently hospitalized for COPD exacerbation  Endocrinology is following for type 2 DM management  Most recent a1c 9 2  Diabetes is complicated by CKD, neuropathy  He has no major complaints today besides scrotal pain from infection  Inpatient consult to Endocrinology  Consult performed by: Paradise Jaeger DO  Consult ordered by: Liz Posey DO        Review of Systems   Constitutional: Negative for chills, fatigue and fever  HENT: Negative for rhinorrhea and sore throat  Respiratory: Negative for choking, chest tightness, shortness of breath, wheezing and stridor  Cardiovascular: Negative for chest pain, palpitations and leg swelling  Gastrointestinal: Negative for abdominal pain, blood in stool, constipation, diarrhea, nausea and vomiting  Genitourinary: Negative for hematuria  Neurological: Negative for dizziness and light-headedness  All other systems reviewed and are negative        Historical Information   Past Medical History:   Diagnosis Date    Abdominal pain     [facility] ([facility] ) 2018    Cardiac disease     CHF (congestive heart failure) (HCC)     COPD, severe (HCC)     Coronary artery disease     DDD (degenerative disc disease), lumbar 2020    Diabetes mellitus (HCC)     Dyspnea     GERD (gastroesophageal reflux disease)     Hyperlipidemia     Hypertension     MI (myocardial infarction) ([facility] )     with 3 stents    Nodule of apex of right lung     JACQUELINE (obstructive sleep apnea)     Prostate cancer [facility])      Past Surgical History:   Procedure Laterality Date    ABDOMINAL SURGERY      exploratory    ANGIOPLASTY      3 stents    APPENDECTOMY      COLONOSCOPY      CT NEEDLE BIOPSY LUNG  11/3/2020    ESOPHAGOGASTRODUODENOSCOPY N/A 10/2/2017    Procedure: ESOPHAGOGASTRODUODENOSCOPY (EGD); Surgeon: Fay Whaley MD;  Location:  GI LAB;   Service: Gastroenterology    IR THORACENTESIS  11/3/2020    KNEE CARTILAGE SURGERY      OTHER SURGICAL HISTORY      stent placement    PROSTATE SURGERY      SKIN GRAFT      Basal cell CA back     Social History   Social History     Substance and Sexual Activity   Alcohol Use Never     Social History     Substance and Sexual Activity   Drug Use No     Social History     Tobacco Use   Smoking Status Former Smoker    Packs/day: 1 50    Years: 50 00    Pack years: 75 00    Start date: 38    Quit date: 2020    Years since quittin 9   Smokeless Tobacco Never Used     Family History:   Family History   Problem Relation Age of Onset    Heart disease Father     Other Father         Mesothelioma        Meds/Allergies   Current Facility-Administered Medications   Medication Dose Route Frequency Provider Last Rate Last Admin    acetaminophen (TYLENOL) tablet 975 mg  975 mg Oral [address] Bautista Cantrell DO   975 mg at 22 1345    albuterol inhalation solution 2 5 mg  2 5 mg Nebulization Q4H PRN Yogi Cantrell DO        aspirin (ECOTRIN LOW STRENGTH) EC tablet 81 mg  81 mg Oral Daily Diony Molina DO   81 mg at 06/16/22 0814    carvedilol (COREG) tablet 6 25 mg  6 25 mg Oral BID With Meals Khadijah Number, DO   6 25 mg at 06/16/22 0814    cefTRIAXone (ROCEPHIN) 2,000 mg in dextrose 5 % 50 mL IVPB  2,000 mg Intravenous Q24H Khadijah Number,  mL/hr at 06/16/22 0814 2,000 mg at 06/16/22 0814    cyclobenzaprine (FLEXERIL) tablet 10 mg  10 mg Oral BID Khadijah Number, DO   10 mg at 06/16/22 3833    ferrous sulfate tablet 325 mg  325 mg Oral Daily With Breakfast Khadijah Number, DO   325 mg at 06/16/22 0814    fluticasone-vilanterol (BREO ELLIPTA) 200-25 MCG/INH inhaler 1 puff  1 puff Inhalation Daily Khadijah Number, DO   1 puff at 06/16/22 0817    furosemide (LASIX) tablet 40 mg  40 mg Oral Daily Khadijah Number, DO   40 mg at 06/16/22 0158    heparin (porcine) subcutaneous injection 5,000 Units  5,000 Units Subcutaneous [facility] Khadijah Number, DO   5,000 Units at 06/16/22 1346    HYDROmorphone HCl (DILAUDID) injection 0 2 mg  0 2 mg Intravenous Q3H PRN Love Grey, DO   0 2 mg at 06/16/22 1344    insulin lispro (HumaLOG) 100 units/mL subcutaneous injection 4-20 Units  4-20 Units Subcutaneous Q6H [facility], [facility]        insulin regular (HumuLIN R U-500) 500 units/mL CONCENTRATED injection 40 Units  40 Units Subcutaneous Q6H [facility], DO        ipratropium (ATROVENT) 0 02 % inhalation solution 0 5 mg  0 5 mg Nebulization TID Khadijah Number, DO   0 5 mg at 06/16/22 1433    levalbuterol (XOPENEX) inhalation solution 1 25 mg  1 25 mg Nebulization TID Khadijah Number, DO   1 25 mg at 06/16/22 1433    lisinopril (ZESTRIL) tablet 10 mg  10 mg Oral Daily Khadijah Number, DO   10 mg at 06/16/22 0814    ondansetron (ZOFRAN) injection 4 mg  4 mg Intravenous Q6H PRN Khadijah Number, DO        oxyCODONE (ROXICODONE) IR tablet 5 mg  5 mg Oral Q4H PRN Khadijah Number, DO   5 mg at 06/16/22 1255    oxyCODONE (ROXICODONE) IR tablet 7 5 mg  7 5 mg Oral Q4H PRN Love Edmonds, DO        potassium chloride (K-DUR,KLOR-CON) CR tablet 20 mEq  20 mEq Oral Daily Elif Chirinos, DO   20 mEq at 06/16/22 0814    pravastatin (PRAVACHOL) tablet 80 mg  80 mg Oral Daily With Netlift, DO   80 mg at 06/15/22 1648    tamsulosin (FLOMAX) capsule 0 4 mg  0 4 mg Oral Daily With Dinner Gutierrez Chaudhary PA-C   0 4 mg at 06/15/22 1649     Allergies   Allergen Reactions    Crestor [Rosuvastatin] Other (See Comments)     Unknown      Metformin GI Intolerance       Objective   Vitals: Blood pressure 102/64, pulse 85, temperature 98 4 °F (36 9 °C), resp  rate 16, height 6' (1 829 m), weight 99 8 kg (220 lb), SpO2 96 %  Intake/Output Summary (Last 24 hours) at 6/16/2022 1658  Last data filed at 6/16/2022 1454  Gross per 24 hour   Intake 660 ml   Output 2075 ml   Net -1415 ml     Invasive Devices  Report    Peripheral Intravenous Line  Duration           Peripheral IV 06/15/22 Right Forearm 1 day                Physical Exam  Constitutional:       Appearance: He is well-developed  HENT:      Head: Normocephalic and atraumatic  Eyes:      General:         Right eye: No discharge  Left eye: No discharge  Cardiovascular:      Rate and Rhythm: Normal rate and regular rhythm  Heart sounds: Normal heart sounds  No murmur heard  No friction rub  No gallop  Pulmonary:      Effort: Pulmonary effort is normal  No respiratory distress  Breath sounds: Normal breath sounds  No wheezing or rales  Chest:      Chest wall: No tenderness  Abdominal:      General: Bowel sounds are normal  There is no distension  Palpations: Abdomen is soft  There is no mass  Tenderness: There is no abdominal tenderness  Musculoskeletal:         General: Normal range of motion  Cervical back: Normal range of motion and neck supple  Skin:     General: Skin is warm and dry  Neurological:      Mental Status: He is alert and oriented to person, place, and time     Psychiatric:         Behavior: Behavior normal        The history was obtained from the review of the chart, patient  Lab Results:       Lab Results   Component Value Date    WBC 9 56 06/16/2022    HGB 12 7 06/16/2022    HCT 40 1 06/16/2022    MCV 82 06/16/2022     06/16/2022     Lab Results   Component Value Date/Time    BUN 24 06/16/2022 10:15 AM    BUN 17 09/09/2015 05:30 AM     09/09/2015 05:30 AM    K 4 4 06/16/2022 10:15 AM    K 4 2 09/09/2015 05:30 AM     06/16/2022 10:15 AM     09/09/2015 05:30 AM    CO2 32 06/16/2022 10:15 AM    CO2 27 06/14/2019 08:47 PM    CREATININE 1 29 06/16/2022 10:15 AM    CREATININE 1 03 09/09/2015 05:30 AM    AST 12 06/15/2022 05:30 AM    AST 19 09/08/2015 02:00 PM    ALT 15 06/15/2022 05:30 AM    ALT 27 09/08/2015 02:00 PM    ALB 2 6 (L) 06/15/2022 05:30 AM    ALB 3 3 (L) 09/08/2015 02:00 PM     No results for input(s): CHOL, HDL, LDL, TRIG, VLDL in the last 72 hours  No results found for: Ibis Rich  POC Glucose (mg/dl)   Date Value   06/16/2022 104   06/16/2022 265 (H)   06/16/2022 229 (H)   06/15/2022 374 (H)   06/15/2022 398 (H)   06/15/2022 464 (H)   06/12/2022 293 (H)   06/12/2022 216 (H)   06/11/2022 231 (H)   06/11/2022 106       Imaging Studies: I have personally reviewed pertinent reports  Portions of the record may have been created with voice recognition software

## 2022-06-17 ENCOUNTER — PATIENT OUTREACH (OUTPATIENT)
Dept: FAMILY MEDICINE CLINIC | Facility: CLINIC | Age: 77
End: 2022-06-17

## 2022-06-17 LAB
ANION GAP SERPL CALCULATED.3IONS-SCNC: 4 MMOL/L (ref 4–13)
BUN SERPL-MCNC: 24 MG/DL (ref 5–25)
CALCIUM SERPL-MCNC: 8.8 MG/DL (ref 8.3–10.1)
CHLORIDE SERPL-SCNC: 99 MMOL/L (ref 100–108)
CO2 SERPL-SCNC: 31 MMOL/L (ref 21–32)
CREAT SERPL-MCNC: 1.41 MG/DL (ref 0.6–1.3)
ERYTHROCYTE [DISTWIDTH] IN BLOOD BY AUTOMATED COUNT: 15.3 % (ref 11.6–15.1)
GFR SERPL CREATININE-BSD FRML MDRD: 48 ML/MIN/1.73SQ M
GLUCOSE SERPL-MCNC: 119 MG/DL (ref 65–140)
GLUCOSE SERPL-MCNC: 120 MG/DL (ref 65–140)
GLUCOSE SERPL-MCNC: 177 MG/DL (ref 65–140)
GLUCOSE SERPL-MCNC: 289 MG/DL (ref 65–140)
HCT VFR BLD AUTO: 40.2 % (ref 36.5–49.3)
HGB BLD-MCNC: 12.8 G/DL (ref 12–17)
MCH RBC QN AUTO: 26 PG (ref 26.8–34.3)
MCHC RBC AUTO-ENTMCNC: 31.8 G/DL (ref 31.4–37.4)
MCV RBC AUTO: 82 FL (ref 82–98)
PLATELET # BLD AUTO: 216 THOUSANDS/UL (ref 149–390)
PMV BLD AUTO: 9.9 FL (ref 8.9–12.7)
POTASSIUM SERPL-SCNC: 4.4 MMOL/L (ref 3.5–5.3)
RBC # BLD AUTO: 4.92 MILLION/UL (ref 3.88–5.62)
SODIUM SERPL-SCNC: 134 MMOL/L (ref 136–145)
WBC # BLD AUTO: 11.89 THOUSAND/UL (ref 4.31–10.16)

## 2022-06-17 PROCEDURE — 94760 N-INVAS EAR/PLS OXIMETRY 1: CPT

## 2022-06-17 PROCEDURE — 94640 AIRWAY INHALATION TREATMENT: CPT

## 2022-06-17 PROCEDURE — 99232 SBSQ HOSP IP/OBS MODERATE 35: CPT | Performed by: INTERNAL MEDICINE

## 2022-06-17 PROCEDURE — 80048 BASIC METABOLIC PNL TOTAL CA: CPT | Performed by: GENERAL PRACTICE

## 2022-06-17 PROCEDURE — 85027 COMPLETE CBC AUTOMATED: CPT | Performed by: GENERAL PRACTICE

## 2022-06-17 PROCEDURE — 99232 SBSQ HOSP IP/OBS MODERATE 35: CPT | Performed by: GENERAL PRACTICE

## 2022-06-17 PROCEDURE — 82948 REAGENT STRIP/BLOOD GLUCOSE: CPT

## 2022-06-17 PROCEDURE — 99232 SBSQ HOSP IP/OBS MODERATE 35: CPT | Performed by: UROLOGY

## 2022-06-17 RX ORDER — GABAPENTIN 100 MG/1
100 CAPSULE ORAL 3 TIMES DAILY
Status: DISCONTINUED | OUTPATIENT
Start: 2022-06-17 | End: 2022-06-19 | Stop reason: HOSPADM

## 2022-06-17 RX ORDER — CIPROFLOXACIN 2 MG/ML
400 INJECTION, SOLUTION INTRAVENOUS EVERY 12 HOURS
Status: DISCONTINUED | OUTPATIENT
Start: 2022-06-17 | End: 2022-06-19 | Stop reason: HOSPADM

## 2022-06-17 RX ORDER — LIDOCAINE 50 MG/G
1 PATCH TOPICAL DAILY
Status: DISCONTINUED | OUTPATIENT
Start: 2022-06-17 | End: 2022-06-19 | Stop reason: HOSPADM

## 2022-06-17 RX ADMIN — CARVEDILOL 6.25 MG: 6.25 TABLET, FILM COATED ORAL at 17:44

## 2022-06-17 RX ADMIN — OXYCODONE HYDROCHLORIDE 7.5 MG: 5 TABLET ORAL at 05:53

## 2022-06-17 RX ADMIN — POTASSIUM CHLORIDE 20 MEQ: 1500 TABLET, EXTENDED RELEASE ORAL at 08:21

## 2022-06-17 RX ADMIN — FLUTICASONE FUROATE AND VILANTEROL TRIFENATATE 1 PUFF: 200; 25 POWDER RESPIRATORY (INHALATION) at 08:22

## 2022-06-17 RX ADMIN — GABAPENTIN 100 MG: 100 CAPSULE ORAL at 17:43

## 2022-06-17 RX ADMIN — GABAPENTIN 100 MG: 100 CAPSULE ORAL at 09:24

## 2022-06-17 RX ADMIN — IPRATROPIUM BROMIDE 0.5 MG: 0.5 SOLUTION RESPIRATORY (INHALATION) at 20:05

## 2022-06-17 RX ADMIN — GABAPENTIN 100 MG: 100 CAPSULE ORAL at 21:12

## 2022-06-17 RX ADMIN — TAMSULOSIN HYDROCHLORIDE 0.4 MG: 0.4 CAPSULE ORAL at 17:46

## 2022-06-17 RX ADMIN — CYCLOBENZAPRINE HYDROCHLORIDE 10 MG: 10 TABLET, FILM COATED ORAL at 08:21

## 2022-06-17 RX ADMIN — INSULIN LISPRO 4 UNITS: 100 INJECTION, SOLUTION INTRAVENOUS; SUBCUTANEOUS at 12:19

## 2022-06-17 RX ADMIN — LIDOCAINE 5% 1 PATCH: 700 PATCH TOPICAL at 09:24

## 2022-06-17 RX ADMIN — CEFTRIAXONE 2000 MG: 2 INJECTION, POWDER, FOR SOLUTION INTRAMUSCULAR; INTRAVENOUS at 08:23

## 2022-06-17 RX ADMIN — FERROUS SULFATE TAB 325 MG (65 MG ELEMENTAL FE) 325 MG: 325 (65 FE) TAB at 08:21

## 2022-06-17 RX ADMIN — CYCLOBENZAPRINE HYDROCHLORIDE 10 MG: 10 TABLET, FILM COATED ORAL at 21:11

## 2022-06-17 RX ADMIN — LEVALBUTEROL HYDROCHLORIDE 1.25 MG: 1.25 SOLUTION, CONCENTRATE RESPIRATORY (INHALATION) at 08:29

## 2022-06-17 RX ADMIN — INSULIN HUMAN 35 UNITS: 500 INJECTION, SOLUTION SUBCUTANEOUS at 17:46

## 2022-06-17 RX ADMIN — ACETAMINOPHEN 975 MG: 325 TABLET, FILM COATED ORAL at 21:12

## 2022-06-17 RX ADMIN — HEPARIN SODIUM 5000 UNITS: 5000 INJECTION INTRAVENOUS; SUBCUTANEOUS at 05:53

## 2022-06-17 RX ADMIN — ASPIRIN 81 MG: 81 TABLET, COATED ORAL at 08:21

## 2022-06-17 RX ADMIN — INSULIN HUMAN 40 UNITS: 500 INJECTION, SOLUTION SUBCUTANEOUS at 06:05

## 2022-06-17 RX ADMIN — HEPARIN SODIUM 5000 UNITS: 5000 INJECTION INTRAVENOUS; SUBCUTANEOUS at 14:08

## 2022-06-17 RX ADMIN — ACETAMINOPHEN 975 MG: 325 TABLET, FILM COATED ORAL at 14:08

## 2022-06-17 RX ADMIN — CIPROFLOXACIN 400 MG: 2 INJECTION, SOLUTION INTRAVENOUS at 14:08

## 2022-06-17 RX ADMIN — OXYCODONE HYDROCHLORIDE 7.5 MG: 5 TABLET ORAL at 21:11

## 2022-06-17 RX ADMIN — LEVALBUTEROL HYDROCHLORIDE 1.25 MG: 1.25 SOLUTION, CONCENTRATE RESPIRATORY (INHALATION) at 20:05

## 2022-06-17 RX ADMIN — INSULIN LISPRO 12 UNITS: 100 INJECTION, SOLUTION INTRAVENOUS; SUBCUTANEOUS at 17:46

## 2022-06-17 RX ADMIN — ACETAMINOPHEN 975 MG: 325 TABLET, FILM COATED ORAL at 05:53

## 2022-06-17 RX ADMIN — HEPARIN SODIUM 5000 UNITS: 5000 INJECTION INTRAVENOUS; SUBCUTANEOUS at 21:12

## 2022-06-17 RX ADMIN — IPRATROPIUM BROMIDE 0.5 MG: 0.5 SOLUTION RESPIRATORY (INHALATION) at 08:29

## 2022-06-17 RX ADMIN — PRAVASTATIN SODIUM 80 MG: 80 TABLET ORAL at 17:46

## 2022-06-17 NOTE — ASSESSMENT & PLAN NOTE
Patient presented with progressive scrotal pain for the past 2 days  Ultrasound with  Bilateral epididymitis and left-sided orchitis  Complex left hydrocele possibly reactive related to adjacent inflammation/infection  CT scan with cellulitis in the area of the mons pubis  Abnormal urinalysis  Patient afebrile with leukocytosis  IV ceftriaxone -> Cipro as U Cx grew [address] [facility] urology for further management - appreciated  Pain control and supportive care - c/w oxycodone 7 5 mg prn    Can trial 10 mg if needed

## 2022-06-17 NOTE — PLAN OF CARE
Problem: Potential for Falls  Goal: Patient will remain free of falls  Description: INTERVENTIONS:  - Educate patient/family on patient safety including physical limitations  - Instruct patient to call for assistance with activity   - Consult OT/PT to assist with strengthening/mobility   - Keep Call bell within reach  - Keep bed low and locked with side rails adjusted as appropriate  - Keep care items and personal belongings within reach  - Initiate and maintain comfort rounds  - Make Fall Risk Sign visible to staff  - Apply yellow socks and bracelet for high fall risk patients  - Consider moving patient to room near nurses station  Outcome: Progressing     Problem: PAIN - ADULT  Goal: Verbalizes/displays adequate comfort level or baseline comfort level  Description: Interventions:  - Encourage patient to monitor pain and request assistance  - Assess pain using appropriate pain scale  - Administer analgesics based on type and severity of pain and evaluate response  - Implement non-pharmacological measures as appropriate and evaluate response  - Consider cultural and social influences on pain and pain management  - Notify physician/advanced practitioner if interventions unsuccessful or patient reports new pain  Outcome: Progressing     Problem: INFECTION - ADULT  Goal: Absence or prevention of progression during hospitalization  Description: INTERVENTIONS:  - Assess and monitor for signs and symptoms of infection  - Monitor lab/diagnostic results  - Monitor all insertion sites, i e  indwelling lines, tubes, and drains  - Monitor endotracheal if appropriate and nasal secretions for changes in amount and color  - [place] appropriate cooling/warming therapies per order  - Administer medications as ordered  - Instruct and encourage patient and family to use good hand hygiene technique  - Identify and instruct in appropriate isolation precautions for identified infection/condition  Outcome: Progressing  Goal: Absence of fever/infection during neutropenic period  Description: INTERVENTIONS:  - Monitor WBC    Outcome: Progressing     Problem: SAFETY ADULT  Goal: Patient will remain free of falls  Description: INTERVENTIONS:  - Educate patient/family on patient safety including physical limitations  - Instruct patient to call for assistance with activity   - Consult OT/PT to assist with strengthening/mobility   - Keep Call bell within reach  - Keep bed low and locked with side rails adjusted as appropriate  - Keep care items and personal belongings within reach  - Initiate and maintain comfort rounds  - Make Fall Risk Sign visible to staff  - Apply yellow socks and bracelet for high fall risk patients  - Consider moving patient to room near nurses station  Outcome: Progressing  Goal: Maintain or return to baseline ADL function  Description: INTERVENTIONS:  -  Assess patient's ability to carry out ADLs; assess patient's baseline for ADL function and identify physical deficits which impact ability to perform ADLs (bathing, care of mouth/teeth, toileting, grooming, dressing, etc )  - Assess/evaluate cause of self-care deficits   - Assess range of motion  - Assess patient's mobility; develop plan if impaired  - Assess patient's need for assistive devices and provide as appropriate  - Encourage maximum independence but intervene and supervise when necessary  - Involve family in performance of ADLs  - Assess for home care needs following discharge   - Consider OT consult to assist with ADL evaluation and planning for discharge  - Provide patient education as appropriate  Outcome: Progressing  Goal: Maintains/Returns to pre admission functional level  Description: INTERVENTIONS:  - Perform BMAT or MOVE assessment daily    - Set and communicate daily mobility goal to care team and patient/family/caregiver     - Collaborate with rehabilitation services on mobility goals if consulted  - Out of bed for toileting  - Record patient progress and toleration of activity level   Outcome: Progressing     Problem: DISCHARGE PLANNING  Goal: Discharge to home or other facility with appropriate resources  Description: INTERVENTIONS:  - Identify barriers to discharge w/patient and caregiver  - Arrange for needed discharge resources and transportation as appropriate  - Identify discharge learning needs (meds, wound care, etc )  - Arrange for interpretive services to assist at discharge as needed  - Refer to Case Management Department for coordinating discharge planning if the patient needs post-hospital services based on physician/advanced practitioner order or complex needs related to functional status, cognitive ability, or social support system  Outcome: Progressing     Problem: Knowledge Deficit  Goal: Patient/family/caregiver demonstrates understanding of disease process, treatment plan, medications, and discharge instructions  Description: Complete learning assessment and assess knowledge base    Interventions:  - Provide teaching at level of understanding  - Provide teaching via preferred learning methods  Outcome: Progressing

## 2022-06-17 NOTE — ASSESSMENT & PLAN NOTE
Lab Results   Component Value Date    EGFR 48 06/17/2022    EGFR 54 06/16/2022    EGFR 43 06/15/2022    CREATININE 1 41 (H) 06/17/2022    CREATININE 1 29 06/16/2022    CREATININE 1 54 (H) 06/15/2022   Estimated Creatinine Clearance: 56 2 mL/min (A) (by C-G formula based on SCr of 1 41 mg/dL (H))    Baseline appears around 1 2-1 4  Monitor

## 2022-06-17 NOTE — CASE MANAGEMENT
Case Management Discharge Planning Note    Patient name Harshal Michael   Location [facility] 827/[facility] 735-17 MRN [id number]  : 1945 Date 2022       Current Admission Date: 6/15/2022  Current Admission Diagnosis:Acute cystitis without hematuria   Patient Active Problem List    Diagnosis Date Noted    Epididymitis, bilateral 06/15/2022    Acute cystitis without hematuria 06/15/2022    [facility] failure with hypoxia [facility] ) 2022    Chronic bilateral low back pain without sciatica 2022    Shortness of breath 2022    CKD (chronic kidney disease) stage 3, GFR 30-59 ml/min ([facility] ) 2022    History of prostate cancer 2022    Adenocarcinoma of lung ([facility] ) 2022    Fracture of navicular bone of left foot 2021    Chronic respiratory failure with hypoxia ([facility] ) 04/15/2021    PAD (peripheral artery disease) ([facility] ) 2021    DDD (degenerative disc disease), lumbar 2020    Anemia, iron deficiency 2020    Lung nodule 2020    Pulmonary hypertension ([facility] ) 2019    JACQUELINE (obstructive sleep apnea) 2019    COPD with acute exacerbation ([facility] ) 2019    Oxygen dependent 2018    RBBB 2018    CAD (coronary artery disease) 2018    Chronic diastolic heart failure ([facility] ) 2018    Pleural effusion on right 10/31/2017    COPD, severe ([facility] ) 2017    Diabetic neuropathy ([facility] ) 2017    Essential hypertension 2016    Venous stasis dermatitis of both lower extremities 11/15/2016    Type 2 diabetes mellitus with hyperglycemia, with long-term current use of insulin ([facility] ) 2016    COPD exacerbation ([facility] ) 2016    HLD (hyperlipidemia) 2016      LOS (days): 2  Geometric Mean LOS (GMLOS) (days): 3 00  Days to GMLOS:1 2     OBJECTIVE:  Risk of Unplanned Readmission Score: 42 01         Current admission status: Inpatient   Preferred Pharmacy:   [facility] [address] [facility] [address], [address] [facility] [address] [address] [facility] [address]  Phone: [phone] Fax: [phone]    Primary Care Provider: Lashaun Urrutia MD    Primary Insurance: [facility]  Secondary Insurance:     DISCHARGE DETAILS:               [address] [facility] Agency Name[de-identified] [facility]         Other Referral/Resources/Interventions Provided:  Referral Comments: [facility] accepted  Updated DCI

## 2022-06-17 NOTE — PROGRESS NOTES
Progress Note - Reema Lockwood Sr  78 y o  male MRN: [id number]    Unit/Bed#: [facility] 827-01 Encounter: [id number]    CC: diabetes f/u    Subjective:   Reema Lockwood Sr  is a 78y o  year old male with type 2 diabetes admitted for bilateral epididymitis and left sided orchitis  He denies any new complaints today  He did not get u-500 insulin at midnight as ordered last night because blood sugars were 84  Objective:     Vitals: Blood pressure (!) 108/43, pulse 69, temperature 98 °F (36 7 °C), resp  rate 16, height 6' (1 829 m), weight 99 8 kg (220 lb), SpO2 95 %  ,Body mass index is 29 84 kg/m²  Intake/Output Summary (Last 24 hours) at 6/17/2022 1053  Last data filed at 6/17/2022 5760  Gross per 24 hour   Intake --   Output 1975 ml   Net -1975 ml       Physical Exam:  General Appearance: awake, appears stated age and cooperative  Head: Normocephalic, without obvious abnormality, atraumatic  Extremities: moves all extremities  Skin: Skin color and temperature normal    Pulm: no labored breathing    Lab, Imaging and other studies: I have personally reviewed pertinent reports  POC Glucose (mg/dl)   Date Value   06/17/2022 120   06/16/2022 84   06/16/2022 131   06/16/2022 104   06/16/2022 265 (H)   06/16/2022 229 (H)   06/15/2022 374 (H)   06/15/2022 398 (H)   06/15/2022 464 (H)   06/12/2022 293 (H)     Assessment and plan: This is a 78y o -year-old male with type 2 DM admitted for bilateral epididymitis and L sided orchitis      Plan:  Type 2 DM  Most recent a1c 9 2  Home regimen: u-500 45/45/35 before meals  Inpatient regimen: 40 units u-500 q6h  Resume home regimen with 45 units before breakfast, 45 units before lunch, 35 units before dinner of u-500  Continue to monitor blood glucoses and make adjustments as needed over time      Bilateral epididymitis, left sided orchitis  Management as per primary team      Portions of the record may have been created with voice recognition software

## 2022-06-17 NOTE — PLAN OF CARE
Problem: Potential for Falls  Goal: Patient will remain free of falls  Description: INTERVENTIONS:  - Educate patient/family on patient safety including physical limitations  - Instruct patient to call for assistance with activity   - Consult OT/PT to assist with strengthening/mobility   - Keep Call bell within reach  - Keep bed low and locked with side rails adjusted as appropriate  - Keep care items and personal belongings within reach  - Initiate and maintain comfort rounds  - Make Fall Risk Sign visible to staff  - Apply yellow socks and bracelet for high fall risk patients  - Consider moving patient to room near nurses station  Outcome: Progressing     Problem: PAIN - ADULT  Goal: Verbalizes/displays adequate comfort level or baseline comfort level  Description: Interventions:  - Encourage patient to monitor pain and request assistance  - Assess pain using appropriate pain scale  - Administer analgesics based on type and severity of pain and evaluate response  - Implement non-pharmacological measures as appropriate and evaluate response  - Consider cultural and social influences on pain and pain management  - Notify physician/advanced practitioner if interventions unsuccessful or patient reports new pain  Outcome: Progressing     Problem: INFECTION - ADULT  Goal: Absence or prevention of progression during hospitalization  Description: INTERVENTIONS:  - Assess and monitor for signs and symptoms of infection  - Monitor lab/diagnostic results  - Monitor all insertion sites, i e  indwelling lines, tubes, and drains  - Monitor endotracheal if appropriate and nasal secretions for changes in amount and color  - [place] appropriate cooling/warming therapies per order  - Administer medications as ordered  - Instruct and encourage patient and family to use good hand hygiene technique  - Identify and instruct in appropriate isolation precautions for identified infection/condition  Outcome: Progressing  Goal: Absence of fever/infection during neutropenic period  Description: INTERVENTIONS:  - Monitor WBC    Outcome: Progressing     Problem: SAFETY ADULT  Goal: Patient will remain free of falls  Description: INTERVENTIONS:  - Educate patient/family on patient safety including physical limitations  - Instruct patient to call for assistance with activity   - Consult OT/PT to assist with strengthening/mobility   - Keep Call bell within reach  - Keep bed low and locked with side rails adjusted as appropriate  - Keep care items and personal belongings within reach  - Initiate and maintain comfort rounds  - Make Fall Risk Sign visible to staff  - Apply yellow socks and bracelet for high fall risk patients  - Consider moving patient to room near nurses station  Outcome: Progressing  Goal: Maintain or return to baseline ADL function  Description: INTERVENTIONS:  -  Assess patient's ability to carry out ADLs; assess patient's baseline for ADL function and identify physical deficits which impact ability to perform ADLs (bathing, care of mouth/teeth, toileting, grooming, dressing, etc )  - Assess/evaluate cause of self-care deficits   - Assess range of motion  - Assess patient's mobility; develop plan if impaired  - Assess patient's need for assistive devices and provide as appropriate  - Encourage maximum independence but intervene and supervise when necessary  - Involve family in performance of ADLs  - Assess for home care needs following discharge   - Consider OT consult to assist with ADL evaluation and planning for discharge  - Provide patient education as appropriate  Outcome: Progressing  Goal: Maintains/Returns to pre admission functional level  Description: INTERVENTIONS:  - Perform BMAT or MOVE assessment daily    - Set and communicate daily mobility goal to care team and patient/family/caregiver     - Collaborate with rehabilitation services on mobility goals if consulted  - Out of bed for toileting  - Record patient progress and toleration of activity level   Outcome: Progressing

## 2022-06-17 NOTE — PROGRESS NOTES
Patient is a HRR referral  He is currently admitted to [facility] with cystitis  He also has DM II, last A1c was 9 2, CHF, severe COPD, & CKD 3  Will follow in surveillance & outreach when discharged home

## 2022-06-17 NOTE — ASSESSMENT & PLAN NOTE
Lab Results   Component Value Date    HGBA1C 9 2 (H) 04/22/2022       Recent Labs     06/16/22  1804 06/16/22  2304 06/17/22  0601 06/17/22  1140   POCGLU 131 84 120 177*       Blood Sugar Average: Last 72 hrs:  (P) 234 6   Continue U500  Monitor blood sugar  BSs high, endo appreciated  Will do U500 q6h while IP

## 2022-06-17 NOTE — PROGRESS NOTES
[address] [facility]  Progress Note - David Duckworth Sr  1945, 78 y o  male MRN: [id number]  Unit/Bed#: [facility] 827-01 Encounter: [id number]  Primary Care Provider: Jane Singer MD   Date and time admitted to hospital: 6/15/2022  5:05 AM    * Acute cystitis without hematuria  Assessment & Plan  · U Cx grew Psuedomonas  · Rocephin -> Cipro    Epididymitis, bilateral  Assessment & Plan  Patient presented with progressive scrotal pain for the past 2 days  Ultrasound with  Bilateral epididymitis and left-sided orchitis  Complex left hydrocele possibly reactive related to adjacent inflammation/infection  CT scan with cellulitis in the area of the mons pubis  Abnormal urinalysis  Patient afebrile with leukocytosis  IV ceftriaxone -> Cipro as U Cx grew [address] urology for further management - appreciated  Pain control and supportive care - c/w oxycodone 7 5 mg prn  Can trial 10 mg if needed    CKD (chronic kidney disease) stage 3, GFR 30-59 ml/min [facility])  Assessment & Plan  Lab Results   Component Value Date    EGFR 48 06/17/2022    EGFR 54 06/16/2022    EGFR 43 06/15/2022    CREATININE 1 41 (H) 06/17/2022    CREATININE 1 29 06/16/2022    CREATININE 1 54 (H) 06/15/2022   Estimated Creatinine Clearance: 56 2 mL/min (A) (by C-G formula based on SCr of 1 41 mg/dL (H))    Baseline appears around 1 2-1 4  Monitor    Adenocarcinoma of lung [facility])  Assessment & Plan  Follows with radiation oncology as outpatient, most recent office note reviewed from 9/2021  · Recent CTA chest 3/29/22 without acute findings in chest  · Continue outpatient f/u      Chronic respiratory failure with hypoxia (HCC)  Assessment & Plan  · On baseline 2-3L NC       DDD (degenerative disc disease), lumbar  Assessment & Plan  · C/w Flexeril bid  · Add Neurontin  · Can use oxycodone for this as well    CAD (coronary artery disease)  Assessment & Plan  history of CAD with prior stenting (D2 and RCA x2 prior to )   · Continue aspirin carvedilol statin      Essential hypertension  Assessment & Plan  Acceptable BP  Continue home meds    Chronic diastolic heart failure (HCC)  Assessment & Plan  Wt Readings from Last 3 Encounters:   06/15/22 99 8 kg (220 lb)   22 99 8 kg (220 lb)   22 98 9 kg (218 lb)     Continue home dose Lasix        COPD, severe (HCC)  Assessment & Plan  · Stable  · Continue nebs, inhalers      Venous stasis dermatitis of both lower extremities  Assessment & Plan  Continue with local care    Type 2 diabetes mellitus with hyperglycemia, with long-term current use of insulin [facility])  Assessment & Plan  Lab Results   Component Value Date    HGBA1C 9 2 (H) 2022       Recent Labs     22  1804 22  2304 22  0601 22  1140   POCGLU 131 84 120 177*       Blood Sugar Average: Last 72 hrs:  (P) 234 6   Continue U500  Monitor blood sugar  BSs high, endo appreciated  Will do U500 q6h while IP      VTE Pharmacologic Prophylaxis: VTE Score: 10 High Risk (Score >/= 5) - Pharmacological DVT Prophylaxis Ordered: heparin  Sequential Compression Devices Ordered  Patient Centered Rounds: I performed bedside rounds with nursing staff today  Discussions with Specialists or Other Care Team Provider: no    Education and Discussions with Family / Patient: Patient declined call to   Time Spent for Care: 30 minutes  More than 50% of total time spent on counseling and coordination of care as described above  Current Length of Stay: 2 day(s)  Current Patient Status: Inpatient   Certification Statement: The patient will continue to require additional inpatient hospital stay due to need for pain control  Discharge Plan: Anticipate discharge in 24-48 hrs to home with home services      Code Status: Level 1 - Full Code    Subjective:   C/o back pain and pain in scrotum      Objective:     Vitals:   Temp (24hrs), Av 2 °F (36 8 °C), Min:98 °F (36 7 °C), Max:98 4 °F (36 9 °C)    Temp:  [98 °F (36 7 °C)-98 4 °F (36 9 °C)] 98 °F (36 7 °C)  HR:  [69-85] 69  Resp:  [16] 16  BP: (102-108)/(43-64) 108/43  SpO2:  [93 %-97 %] 95 %  Body mass index is 29 84 kg/m²  Input and Output Summary (last 24 hours): Intake/Output Summary (Last 24 hours) at 6/17/2022 1357  Last data filed at 6/17/2022 1101  Gross per 24 hour   Intake 480 ml   Output 1625 ml   Net -1145 ml       Physical Exam:   Physical Exam  HENT:      Head: Normocephalic and atraumatic  Nose: Nose normal       Mouth/Throat:      Mouth: Mucous membranes are moist    Eyes:      Extraocular Movements: Extraocular movements intact  Conjunctiva/sclera: Conjunctivae normal    Cardiovascular:      Rate and Rhythm: Normal rate and regular rhythm  Pulmonary:      Effort: Pulmonary effort is normal       Breath sounds: Normal breath sounds  No wheezing or rales  Abdominal:      General: Bowel sounds are normal  There is no distension  Palpations: Abdomen is soft  Tenderness: There is no abdominal tenderness  Musculoskeletal:         General: Normal range of motion  Cervical back: Normal range of motion and neck supple  Right lower leg: No edema  Left lower leg: No edema  Skin:     General: Skin is warm and dry  Neurological:      Mental Status: He is alert and oriented to person, place, and time  Additional Data:     Labs:  Results from last 7 days   Lab Units 06/17/22 0601 06/16/22  1015 06/15/22  0530   WBC Thousand/uL 11 89*   < > 12 78*   HEMOGLOBIN g/dL 12 8   < > 13 1   HEMATOCRIT % 40 2   < > 39 9   PLATELETS Thousands/uL 216   < > 224   NEUTROS PCT %  --   --  76*   LYMPHS PCT %  --   --  11*   MONOS PCT %  --   --  10   EOS PCT %  --   --  2    < > = values in this interval not displayed       Results from last 7 days   Lab Units 06/17/22 0601 06/16/22  1015 06/15/22  0530   SODIUM mmol/L 134*   < > 135*   POTASSIUM mmol/L 4 4   < > 4 5   CHLORIDE mmol/L 99*   < > 103   CO2 mmol/L 31   < > 31   BUN mg/dL 24   < > 37*   CREATININE mg/dL 1 41*   < > 1 54*   ANION GAP mmol/L 4   < > 1*   CALCIUM mg/dL 8 8   < > 8 7   ALBUMIN g/dL  --   --  2 6*   TOTAL BILIRUBIN mg/dL  --   --  0 33   ALK PHOS U/L  --   --  103   ALT U/L  --   --  15   AST U/L  --   --  12   GLUCOSE RANDOM mg/dL 119   < > 203*    < > = values in this interval not displayed  Results from last 7 days   Lab Units 06/17/22  1140 06/17/22  0601 06/16/22  2304 06/16/22  1804 06/16/22  1606 06/16/22  1038 06/16/22  0722 06/15/22  2233 06/15/22  1628 06/15/22  1359 06/12/22  1121 06/12/22  0823   POC GLUCOSE mg/dl 177* 120 84 131 104 265* 229* 374* 398* 464* 293* 216*         Results from last 7 days   Lab Units 06/15/22  0608   LACTIC ACID mmol/L 1 4       Lines/Drains:  Invasive Devices  Report    Peripheral Intravenous Line  Duration           Peripheral IV 06/15/22 Right Forearm 2 days                      Imaging: No pertinent imaging reviewed      Recent Cultures (last 7 days):   Results from last 7 days   Lab Units 06/15/22  0814 06/11/22  1355   SPUTUM CULTURE   --  3+ Growth of    GRAM STAIN RESULT   --  1+ Polys*  1+ Gram positive cocci in pairs and chains*  No Epithelial cells seen*   URINE CULTURE  >100,000 cfu/ml Pseudomonas aeruginosa*  --        Last 24 Hours Medication List:   Current Facility-Administered Medications   Medication Dose Route Frequency Provider Last Rate    acetaminophen  975 mg Oral [facility] Kathia Galindo,       albuterol  2 5 mg Nebulization Q4H PRN Kathia Galindo, DO      aspirin  81 mg Oral Daily Kathia Galindo, DO      carvedilol  6 25 mg Oral BID With Meals Kathia Galindo, DO      ciprofloxacin  400 mg Intravenous Q12H YOLANDE Correa      cyclobenzaprine  10 mg Oral BID Kathia Galindo, DO      ferrous sulfate  325 mg Oral Daily With Breakfast Kathia Galindo, DO      fluticasone-vilanterol  1 puff Inhalation Daily Kathia Galindo DO      furosemide  40 mg Oral Daily Kathia Galindo,   gabapentin  100 mg Oral TID Zulema Chol, DO      heparin (porcine)  5,000 Units Subcutaneous [facility] Sarah Amend, DO      HYDROmorphone  0 2 mg Intravenous Q3H PRN Zulema Chol, DO      insulin lispro  4-20 Units Subcutaneous Q6H Juana Chamakkala, DO      insulin regular  40 Units Subcutaneous Q6H Juana Chamakkala, DO      ipratropium  0 5 mg Nebulization TID Sarah Amend, DO      levalbuterol  1 25 mg Nebulization TID Sarah Amend, DO      lidocaine  1 patch Topical Daily Zulema Chol, DO      lisinopril  10 mg Oral Daily Sarah Amend, DO      ondansetron  4 mg Intravenous Q6H PRN Sarah Amend, DO      oxyCODONE  5 mg Oral Q4H PRN Sarah Amend, DO      oxyCODONE  7 5 mg Oral Q4H PRN Zulema Chol, DO      potassium chloride  20 mEq Oral Daily Sarah Amend, DO      pravastatin  80 mg Oral Daily With Voddler, DO      tamsulosin  0 4 mg Oral Daily With Deanne Roth PA-C          Today, Patient Was Seen By: Zulema Oshea, DO    **Please Note: This note may have been constructed using a voice recognition system  **

## 2022-06-18 LAB
ANION GAP SERPL CALCULATED.3IONS-SCNC: 4 MMOL/L (ref 4–13)
BACTERIA UR CULT: ABNORMAL
BUN SERPL-MCNC: 24 MG/DL (ref 5–25)
CALCIUM SERPL-MCNC: 8.8 MG/DL (ref 8.3–10.1)
CHLORIDE SERPL-SCNC: 101 MMOL/L (ref 100–108)
CO2 SERPL-SCNC: 31 MMOL/L (ref 21–32)
CREAT SERPL-MCNC: 1.29 MG/DL (ref 0.6–1.3)
ERYTHROCYTE [DISTWIDTH] IN BLOOD BY AUTOMATED COUNT: 15.2 % (ref 11.6–15.1)
GFR SERPL CREATININE-BSD FRML MDRD: 54 ML/MIN/1.73SQ M
GLUCOSE SERPL-MCNC: 159 MG/DL (ref 65–140)
GLUCOSE SERPL-MCNC: 168 MG/DL (ref 65–140)
GLUCOSE SERPL-MCNC: 170 MG/DL (ref 65–140)
GLUCOSE SERPL-MCNC: 188 MG/DL (ref 65–140)
GLUCOSE SERPL-MCNC: 244 MG/DL (ref 65–140)
GLUCOSE SERPL-MCNC: 86 MG/DL (ref 65–140)
HCT VFR BLD AUTO: 40.3 % (ref 36.5–49.3)
HGB BLD-MCNC: 12.4 G/DL (ref 12–17)
MAGNESIUM SERPL-MCNC: 2.6 MG/DL (ref 1.6–2.6)
MCH RBC QN AUTO: 25.1 PG (ref 26.8–34.3)
MCHC RBC AUTO-ENTMCNC: 30.8 G/DL (ref 31.4–37.4)
MCV RBC AUTO: 82 FL (ref 82–98)
PLATELET # BLD AUTO: 198 THOUSANDS/UL (ref 149–390)
PMV BLD AUTO: 10.2 FL (ref 8.9–12.7)
POTASSIUM SERPL-SCNC: 4.4 MMOL/L (ref 3.5–5.3)
RBC # BLD AUTO: 4.94 MILLION/UL (ref 3.88–5.62)
SODIUM SERPL-SCNC: 136 MMOL/L (ref 136–145)
WBC # BLD AUTO: 9.75 THOUSAND/UL (ref 4.31–10.16)

## 2022-06-18 PROCEDURE — 94760 N-INVAS EAR/PLS OXIMETRY 1: CPT

## 2022-06-18 PROCEDURE — 80048 BASIC METABOLIC PNL TOTAL CA: CPT | Performed by: GENERAL PRACTICE

## 2022-06-18 PROCEDURE — 82948 REAGENT STRIP/BLOOD GLUCOSE: CPT

## 2022-06-18 PROCEDURE — 94640 AIRWAY INHALATION TREATMENT: CPT

## 2022-06-18 PROCEDURE — 99232 SBSQ HOSP IP/OBS MODERATE 35: CPT | Performed by: NURSE PRACTITIONER

## 2022-06-18 PROCEDURE — 85027 COMPLETE CBC AUTOMATED: CPT | Performed by: GENERAL PRACTICE

## 2022-06-18 PROCEDURE — 99232 SBSQ HOSP IP/OBS MODERATE 35: CPT | Performed by: GENERAL PRACTICE

## 2022-06-18 PROCEDURE — 99232 SBSQ HOSP IP/OBS MODERATE 35: CPT | Performed by: INTERNAL MEDICINE

## 2022-06-18 PROCEDURE — 83735 ASSAY OF MAGNESIUM: CPT | Performed by: GENERAL PRACTICE

## 2022-06-18 RX ADMIN — OXYCODONE HYDROCHLORIDE 7.5 MG: 5 TABLET ORAL at 21:05

## 2022-06-18 RX ADMIN — LEVALBUTEROL HYDROCHLORIDE 1.25 MG: 1.25 SOLUTION, CONCENTRATE RESPIRATORY (INHALATION) at 20:51

## 2022-06-18 RX ADMIN — CIPROFLOXACIN 400 MG: 2 INJECTION, SOLUTION INTRAVENOUS at 00:22

## 2022-06-18 RX ADMIN — IPRATROPIUM BROMIDE 0.5 MG: 0.5 SOLUTION RESPIRATORY (INHALATION) at 14:14

## 2022-06-18 RX ADMIN — ACETAMINOPHEN 975 MG: 325 TABLET, FILM COATED ORAL at 21:04

## 2022-06-18 RX ADMIN — HEPARIN SODIUM 5000 UNITS: 5000 INJECTION INTRAVENOUS; SUBCUTANEOUS at 13:30

## 2022-06-18 RX ADMIN — ASPIRIN 81 MG: 81 TABLET, COATED ORAL at 08:00

## 2022-06-18 RX ADMIN — HEPARIN SODIUM 5000 UNITS: 5000 INJECTION INTRAVENOUS; SUBCUTANEOUS at 21:05

## 2022-06-18 RX ADMIN — INSULIN HUMAN 40 UNITS: 500 INJECTION, SOLUTION SUBCUTANEOUS at 18:07

## 2022-06-18 RX ADMIN — FUROSEMIDE 40 MG: 40 TABLET ORAL at 08:00

## 2022-06-18 RX ADMIN — INSULIN LISPRO 4 UNITS: 100 INJECTION, SOLUTION INTRAVENOUS; SUBCUTANEOUS at 05:02

## 2022-06-18 RX ADMIN — FERROUS SULFATE TAB 325 MG (65 MG ELEMENTAL FE) 325 MG: 325 (65 FE) TAB at 07:58

## 2022-06-18 RX ADMIN — INSULIN LISPRO 4 UNITS: 100 INJECTION, SOLUTION INTRAVENOUS; SUBCUTANEOUS at 00:19

## 2022-06-18 RX ADMIN — CIPROFLOXACIN 400 MG: 2 INJECTION, SOLUTION INTRAVENOUS at 13:30

## 2022-06-18 RX ADMIN — CARVEDILOL 6.25 MG: 6.25 TABLET, FILM COATED ORAL at 07:57

## 2022-06-18 RX ADMIN — INSULIN LISPRO 8 UNITS: 100 INJECTION, SOLUTION INTRAVENOUS; SUBCUTANEOUS at 11:52

## 2022-06-18 RX ADMIN — GABAPENTIN 100 MG: 100 CAPSULE ORAL at 08:00

## 2022-06-18 RX ADMIN — CARVEDILOL 6.25 MG: 6.25 TABLET, FILM COATED ORAL at 18:05

## 2022-06-18 RX ADMIN — GABAPENTIN 100 MG: 100 CAPSULE ORAL at 21:05

## 2022-06-18 RX ADMIN — LISINOPRIL 10 MG: 10 TABLET ORAL at 08:00

## 2022-06-18 RX ADMIN — CYCLOBENZAPRINE HYDROCHLORIDE 10 MG: 10 TABLET, FILM COATED ORAL at 08:00

## 2022-06-18 RX ADMIN — GABAPENTIN 100 MG: 100 CAPSULE ORAL at 18:05

## 2022-06-18 RX ADMIN — CYCLOBENZAPRINE HYDROCHLORIDE 10 MG: 10 TABLET, FILM COATED ORAL at 21:04

## 2022-06-18 RX ADMIN — INSULIN HUMAN 45 UNITS: 500 INJECTION, SOLUTION SUBCUTANEOUS at 11:54

## 2022-06-18 RX ADMIN — INSULIN HUMAN 45 UNITS: 500 INJECTION, SOLUTION SUBCUTANEOUS at 07:58

## 2022-06-18 RX ADMIN — FLUTICASONE FUROATE AND VILANTEROL TRIFENATATE 1 PUFF: 200; 25 POWDER RESPIRATORY (INHALATION) at 08:01

## 2022-06-18 RX ADMIN — ACETAMINOPHEN 975 MG: 325 TABLET, FILM COATED ORAL at 05:02

## 2022-06-18 RX ADMIN — LIDOCAINE 5% 1 PATCH: 700 PATCH TOPICAL at 21:24

## 2022-06-18 RX ADMIN — LEVALBUTEROL HYDROCHLORIDE 1.25 MG: 1.25 SOLUTION, CONCENTRATE RESPIRATORY (INHALATION) at 07:42

## 2022-06-18 RX ADMIN — IPRATROPIUM BROMIDE 0.5 MG: 0.5 SOLUTION RESPIRATORY (INHALATION) at 20:51

## 2022-06-18 RX ADMIN — POTASSIUM CHLORIDE 20 MEQ: 1500 TABLET, EXTENDED RELEASE ORAL at 08:00

## 2022-06-18 RX ADMIN — TAMSULOSIN HYDROCHLORIDE 0.4 MG: 0.4 CAPSULE ORAL at 18:05

## 2022-06-18 RX ADMIN — PRAVASTATIN SODIUM 80 MG: 80 TABLET ORAL at 18:05

## 2022-06-18 RX ADMIN — LEVALBUTEROL HYDROCHLORIDE 1.25 MG: 1.25 SOLUTION, CONCENTRATE RESPIRATORY (INHALATION) at 14:14

## 2022-06-18 RX ADMIN — HEPARIN SODIUM 5000 UNITS: 5000 INJECTION INTRAVENOUS; SUBCUTANEOUS at 05:02

## 2022-06-18 RX ADMIN — IPRATROPIUM BROMIDE 0.5 MG: 0.5 SOLUTION RESPIRATORY (INHALATION) at 07:42

## 2022-06-18 NOTE — PROGRESS NOTES
Progress Note - Kayli Client   78 y o  male MRN: [id number]    Unit/Bed#: [facility] 827-01 Encounter: [id number]      CC: diabetes f/u    Subjective:   Kayli Client   is a 78y o  year old male with type 2 diabetes admitted for bilateral epididymitis and left sided orchitis  no episodes of hypoglycemia pt feels ok    Objective:     Vitals: Blood pressure 129/60, pulse 71, temperature (!) 97 4 °F (36 3 °C), resp  rate 16, height 6' (1 829 m), weight 99 8 kg (220 lb), SpO2 97 %  ,Body mass index is 29 84 kg/m²  Intake/Output Summary (Last 24 hours) at 6/18/2022 1602  Last data filed at 6/18/2022 1156  Gross per 24 hour   Intake 200 ml   Output 2250 ml   Net -2050 ml       Physical Exam:  General Appearance: awake, appears stated age and cooperative  Head: Normocephalic, without obvious abnormality, atraumatic  Extremities: moves all extremities  Skin: Skin color and temperature normal    Pulm: no labored breathing        POC Glucose (mg/dl)   Date Value   06/18/2022 244 (H)   06/18/2022 188 (H)   06/18/2022 170 (H)   06/17/2022 289 (H)   06/17/2022 177 (H)   06/17/2022 120   06/16/2022 84   06/16/2022 131   06/16/2022 104   06/16/2022 265 (H)       Assessment and plan: This is O 22 y o -year-old male with type 2 DM admitted for bilateral epididymitis and L sided orchitis      Plan:  Type 2 DM  Most recent a1c 9 2  Home regimen: u-500 45/45/35 before meals  Currently patient on U500 insulin 45-45-35 before meals, pt had episodes of hyperglycemia recommend to increase doses to 48 with breakfasts 48 units with lunch and 40 units with dinner  Continue with accu checks  Optimal glucose range 140-180s     Bilateral epididymitis, left sided orchitis  Management as per primary team         Portions of the record may have been created with voice recognition software

## 2022-06-18 NOTE — ASSESSMENT & PLAN NOTE
Lab Results   Component Value Date    EGFR 54 06/18/2022    EGFR 48 06/17/2022    EGFR 54 06/16/2022    CREATININE 1 29 06/18/2022    CREATININE 1 41 (H) 06/17/2022    CREATININE 1 29 06/16/2022   Estimated Creatinine Clearance: 61 5 mL/min (by C-G formula based on SCr of 1 29 mg/dL)    Baseline appears around 1 2-1 4  Monitor

## 2022-06-18 NOTE — PROGRESS NOTES
Progress Note - Roselia Terry Sr  78 y o  male MRN: [id number]    Unit/Bed#: [facility] 827-01 Encounter: [id number]      Assessment:  Laureano Woodard  is a 37-year-old male with history of prostate cancer, brachytherapy admitted scrotal pain, ultrasound demonstrating bilateral epididymitis and positive pseudomonal UTI  On Cipro empirically  Normal white count and renal function    Plan:  · Continue medical optimization and antibiotics  · Narrow per sensitivities  · Patient is incontinent  Monitor voiding  · Scrotal supporter and ice to genitalia  · Can at NSAIDs  · Continue oxycodone and hydromorphone for severe pain  · Discharge at the discretion of the Internal Medicine service  · Will follow-up for symptom reassessment outpatient  Subjective:   Complaints of scrotal pain  Denies fever, chills, dysuria gross hematuria    Objective:     Vitals: Blood pressure 166/61, pulse 95, temperature 97 6 °F (36 4 °C), temperature source Oral, resp  rate 22, height 6' (1 829 m), weight 99 8 kg (220 lb), SpO2 94 %  ,Body mass index is 29 84 kg/m²  Intake/Output Summary (Last 24 hours) at 6/18/2022 0707  Last data filed at 6/18/2022 0401  Gross per 24 hour   Intake 680 ml   Output 1450 ml   Net -770 ml       Physical Exam: General appearance: alert and oriented, in no acute distress, appears stated age, cooperative and no distress  Head: Normocephalic, without obvious abnormality, atraumatic  Neck: no adenopathy, no carotid bruit, no JVD, supple, symmetrical, trachea midline and thyroid not enlarged, symmetric, no tenderness/mass/nodules  Lungs: clear to auscultation bilaterally  Heart: regular rate and rhythm, S1, S2 normal, no murmur, click, rub or gallop  Abdomen: soft, non-tender; bowel sounds normal; no masses,  no organomegaly  Extremities: extremities normal, warm and well-perfused; no cyanosis, clubbing, or edema  Pulses: 2+ and symmetric  Neurologic: Grossly normal  Mild erythema and edema    No crepitus, necrosis or fluctuance appreciated     Invasive Devices  Report    Peripheral Intravenous Line  Duration           Peripheral IV 06/15/22 Right Forearm 3 days              Lab Results   Component Value Date    WBC 9 75 06/18/2022    HGB 12 4 06/18/2022    HCT 40 3 06/18/2022    MCV 82 06/18/2022     06/18/2022     Lab Results   Component Value Date    SODIUM 136 06/18/2022    K 4 4 06/18/2022     06/18/2022    CO2 31 06/18/2022    BUN 24 06/18/2022    CREATININE 1 29 06/18/2022    GLUC 168 (H) 06/18/2022    CALCIUM 8 8 06/18/2022       Lab, Imaging and other studies: I have personally reviewed pertinent reports

## 2022-06-18 NOTE — PLAN OF CARE
Problem: PAIN - ADULT  Goal: Verbalizes/displays adequate comfort level or baseline comfort level  Description: Interventions:  - Encourage patient to monitor pain and request assistance  - Assess pain using appropriate pain scale  - Administer analgesics based on type and severity of pain and evaluate response  - Implement non-pharmacological measures as appropriate and evaluate response  - Consider cultural and social influences on pain and pain management  - Notify physician/advanced practitioner if interventions unsuccessful or patient reports new pain  Outcome: Progressing     Problem: INFECTION - ADULT  Goal: Absence or prevention of progression during hospitalization  Description: INTERVENTIONS:  - Assess and monitor for signs and symptoms of infection  - Monitor lab/diagnostic results  - Monitor all insertion sites, i e  indwelling lines, tubes, and drains  - Monitor endotracheal if appropriate and nasal secretions for changes in amount and color  - [place] appropriate cooling/warming therapies per order  - Administer medications as ordered  - Instruct and encourage patient and family to use good hand hygiene technique  - Identify and instruct in appropriate isolation precautions for identified infection/condition  Outcome: Progressing  Goal: Absence of fever/infection during neutropenic period  Description: INTERVENTIONS:  - Monitor WBC    Outcome: Progressing

## 2022-06-18 NOTE — ASSESSMENT & PLAN NOTE
Lab Results   Component Value Date    HGBA1C 9 2 (H) 04/22/2022       Recent Labs     06/17/22  1731 06/18/22  0016 06/18/22  0450 06/18/22  1150   POCGLU 289* 170* 188* 244*       Blood Sugar Average: Last 72 hrs:  (P) 231 [id number]   Continue U500  Monitor blood sugar  BSs high, endo appreciated  U500 switched back to tid

## 2022-06-18 NOTE — PROGRESS NOTES
[address] [facility]  Progress Note - Ant Garcia Sr  1945, 78 y o  male MRN: [id number]  Unit/Bed#: [facility] 827-01 Encounter: [id number]  Primary Care Provider: Stephanie Tobin MD   Date and time admitted to hospital: 6/15/2022  5:05 AM    * Acute cystitis without hematuria  Assessment & Plan  · U Cx grew Psuedomonas  · Rocephin -> Cipro  · Plan to complete 10 days Cipro    Epididymitis, bilateral  Assessment & Plan  Patient presented with progressive scrotal pain for the past 2 days  Ultrasound with  Bilateral epididymitis and left-sided orchitis  Complex left hydrocele possibly reactive related to adjacent inflammation/infection  CT scan with cellulitis in the area of the mons pubis  Abnormal urinalysis  Patient afebrile with leukocytosis  IV ceftriaxone -> Cipro as U Cx grew [address] urology for further management - appreciated  Pain control and supportive care - c/w oxycodone 7 5 mg prn  CKD (chronic kidney disease) stage 3, GFR 30-59 ml/min [facility])  Assessment & Plan  Lab Results   Component Value Date    EGFR 54 06/18/2022    EGFR 48 06/17/2022    EGFR 54 06/16/2022    CREATININE 1 29 06/18/2022    CREATININE 1 41 (H) 06/17/2022    CREATININE 1 29 06/16/2022   Estimated Creatinine Clearance: 61 5 mL/min (by C-G formula based on SCr of 1 29 mg/dL)    Baseline appears around 1 2-1 4  Monitor    Adenocarcinoma of lung [facility])  Assessment & Plan  Follows with radiation oncology as outpatient, most recent office note reviewed from 9/2021  · Recent CTA chest 3/29/22 without acute findings in chest  · Continue outpatient f/u      Chronic respiratory failure with hypoxia (HCC)  Assessment & Plan  · On baseline 2-3L NC       DDD (degenerative disc disease), lumbar  Assessment & Plan  · C/w Flexeril bid  · Added Neurontin  · Can use oxycodone for this as well    CAD (coronary artery disease)  Assessment & Plan  history of CAD with prior stenting (D2 and RCA x2 prior to )   · Continue aspirin carvedilol statin      Essential hypertension  Assessment & Plan  Acceptable BP  Continue home meds    Chronic diastolic heart failure (HCC)  Assessment & Plan  Wt Readings from Last 3 Encounters:   06/15/22 99 8 kg (220 lb)   22 99 8 kg (220 lb)   22 98 9 kg (218 lb)     Continue home dose Lasix        COPD, severe (HCC)  Assessment & Plan  · Stable  · Continue nebs, inhalers      Venous stasis dermatitis of both lower extremities  Assessment & Plan  Continue with local care    Type 2 diabetes mellitus with hyperglycemia, with long-term current use of insulin [facility])  Assessment & Plan  Lab Results   Component Value Date    HGBA1C 9 2 (H) 2022       Recent Labs     22  1731 22  0016 22  0450 22  1150   POCGLU 289* 170* 188* 244*       Blood Sugar Average: Last 72 hrs:  (P) 231 [id number]   Continue U500  Monitor blood sugar  BSs high, endo appreciated  U500 switched back to tid      VTE Pharmacologic Prophylaxis: VTE Score: 10 High Risk (Score >/= 5) - Pharmacological DVT Prophylaxis Ordered: heparin  Sequential Compression Devices Ordered  Patient Centered Rounds: I performed bedside rounds with nursing staff today  Discussions with Specialists or Other Care Team Provider: no    Education and Discussions with Family / Patient: Patient declined call to   Time Spent for Care: 30 minutes  More than 50% of total time spent on counseling and coordination of care as described above  Current Length of Stay: 3 day(s)  Current Patient Status: Inpatient   Certification Statement: The patient will continue to require additional inpatient hospital stay due to need for IV abx  Discharge Plan: Anticipate discharge tomorrow to home with home services      Code Status: Level 1 - Full Code    Subjective:   Still has pain but improved    Objective:     Vitals:   Temp (24hrs), Av 6 °F (36 4 °C), Min:97 6 °F (36 4 °C), Max:97 6 °F (36 4 °C)    Temp:  [97 6 °F (36 4 °C)] 97 6 °F (36 4 °C)  HR:  [70] 70  Resp:  [18] 18  BP: (126)/(47) 126/47  SpO2:  [94 %-98 %] 96 %  Body mass index is 29 84 kg/m²  Input and Output Summary (last 24 hours): Intake/Output Summary (Last 24 hours) at 6/18/2022 1504  Last data filed at 6/18/2022 1156  Gross per 24 hour   Intake 200 ml   Output 2250 ml   Net -2050 ml       Physical Exam:   Physical Exam  HENT:      Head: Normocephalic and atraumatic  Nose: Nose normal       Mouth/Throat:      Mouth: Mucous membranes are moist    Eyes:      Extraocular Movements: Extraocular movements intact  Conjunctiva/sclera: Conjunctivae normal    Cardiovascular:      Rate and Rhythm: Normal rate and regular rhythm  Pulmonary:      Effort: Pulmonary effort is normal       Breath sounds: Normal breath sounds  No wheezing or rales  Abdominal:      General: Bowel sounds are normal  There is no distension  Palpations: Abdomen is soft  Tenderness: There is no abdominal tenderness  Musculoskeletal:         General: Normal range of motion  Cervical back: Normal range of motion and neck supple  Right lower leg: No edema  Left lower leg: No edema  Skin:     General: Skin is warm and dry  Neurological:      Mental Status: He is alert and oriented to person, place, and time  Additional Data:     Labs:  Results from last 7 days   Lab Units 06/18/22  0503 06/16/22  1015 06/15/22  0530   WBC Thousand/uL 9 75   < > 12 78*   HEMOGLOBIN g/dL 12 4   < > 13 1   HEMATOCRIT % 40 3   < > 39 9   PLATELETS Thousands/uL 198   < > 224   NEUTROS PCT %  --   --  76*   LYMPHS PCT %  --   --  11*   MONOS PCT %  --   --  10   EOS PCT %  --   --  2    < > = values in this interval not displayed       Results from last 7 days   Lab Units 06/18/22  0503 06/16/22  1015 06/15/22  0530   SODIUM mmol/L 136   < > 135*   POTASSIUM mmol/L 4 4   < > 4 5   CHLORIDE mmol/L 101   < > 103   CO2 mmol/L 31   < > 31 BUN mg/dL 24   < > 37*   CREATININE mg/dL 1 29   < > 1 54*   ANION GAP mmol/L 4   < > 1*   CALCIUM mg/dL 8 8   < > 8 7   ALBUMIN g/dL  --   --  2 6*   TOTAL BILIRUBIN mg/dL  --   --  0 33   ALK PHOS U/L  --   --  103   ALT U/L  --   --  15   AST U/L  --   --  12   GLUCOSE RANDOM mg/dL 168*   < > 203*    < > = values in this interval not displayed  Results from last 7 days   Lab Units 06/18/22  1150 06/18/22  0450 06/18/22  0016 06/17/22  1731 06/17/22  1140 06/17/22  0601 06/16/22  2304 06/16/22  1804 06/16/22  1606 06/16/22  1038 06/16/22  0722 06/15/22  2233   POC GLUCOSE mg/dl 244* 188* 170* 289* 177* 120 84 131 104 265* 229* 374*         Results from last 7 days   Lab Units 06/15/22  0608   LACTIC ACID mmol/L 1 4       Lines/Drains:  Invasive Devices  Report    Peripheral Intravenous Line  Duration           Peripheral IV 06/15/22 Right Forearm 3 days                      Imaging: No pertinent imaging reviewed      Recent Cultures (last 7 days):   Results from last 7 days   Lab Units 06/15/22  0814   URINE CULTURE  >100,000 cfu/ml Pseudomonas aeruginosa*       Last 24 Hours Medication List:   Current Facility-Administered Medications   Medication Dose Route Frequency Provider Last Rate    acetaminophen  975 mg Oral [facility] Marvelyn Bamberger, DO      albuterol  2 5 mg Nebulization Q4H PRN Marvelyn Bamberger, DO      aspirin  81 mg Oral Daily Marvelyn Bamberger, DO      carvedilol  6 25 mg Oral BID With Meals Marvelyn Bamberger, DO      ciprofloxacin  400 mg Intravenous Q12H Prema Louis, CRNP 400 mg (06/18/22 1330)    cyclobenzaprine  10 mg Oral BID Marvelyn Bamberger, DO      ferrous sulfate  325 mg Oral Daily With Breakfast Marvelyn Bamberger, DO      fluticasone-vilanterol  1 puff Inhalation Daily Marvelyn Bamberger, DO      furosemide  40 mg Oral Daily Marvelyn Bamberger, DO      gabapentin  100 mg Oral TID Chang Burks DO      heparin (porcine)  5,000 Units Subcutaneous [facility] Marvelyn Bamberger, DO      HYDROmorphone  0 2 mg Intravenous Q3H PRN Evon Kehr, DO      insulin lispro  4-20 Units Subcutaneous Q6H Juana Gabrielala, DO      insulin regular  40 Units Subcutaneous Daily With Luis Alberto Tolliver MD      [START ON 6/19/2022] insulin regular  48 Units Subcutaneous Daily With Lunch Addie Sy MD      [START ON 6/19/2022] insulin regular  48 Units Subcutaneous Daily With Breakfast Addie Sy MD      ipratropium  0 5 mg Nebulization TID Kathia Galindo, DO      levalbuterol  1 25 mg Nebulization TID Kathia Galindo, DO      lidocaine  1 patch Topical Daily Evon Kehr, DO      lisinopril  10 mg Oral Daily Kathia Galindo, DO      ondansetron  4 mg Intravenous Q6H PRN Kathia Galindo, DO      oxyCODONE  5 mg Oral Q4H PRN Kathia Galindo, DO      oxyCODONE  7 5 mg Oral Q4H PRN Evon Kehr, DO      potassium chloride  20 mEq Oral Daily Kathia Galindo, DO      pravastatin  80 mg Oral Daily With [employer], DO      tamsulosin  0 4 mg Oral Daily With Deanne Roth PA-C          Today, Patient Was Seen By: Evon Kehr, DO    **Please Note: This note may have been constructed using a voice recognition system  **

## 2022-06-19 VITALS
HEART RATE: 76 BPM | OXYGEN SATURATION: 100 % | TEMPERATURE: 97.3 F | SYSTOLIC BLOOD PRESSURE: 120 MMHG | BODY MASS INDEX: 29.8 KG/M2 | HEIGHT: 72 IN | RESPIRATION RATE: 18 BRPM | DIASTOLIC BLOOD PRESSURE: 54 MMHG | WEIGHT: 220 LBS

## 2022-06-19 LAB
GLUCOSE SERPL-MCNC: 113 MG/DL (ref 65–140)
GLUCOSE SERPL-MCNC: 156 MG/DL (ref 65–140)

## 2022-06-19 PROCEDURE — 99239 HOSP IP/OBS DSCHRG MGMT >30: CPT | Performed by: GENERAL PRACTICE

## 2022-06-19 PROCEDURE — 94640 AIRWAY INHALATION TREATMENT: CPT

## 2022-06-19 PROCEDURE — 82948 REAGENT STRIP/BLOOD GLUCOSE: CPT

## 2022-06-19 PROCEDURE — 94760 N-INVAS EAR/PLS OXIMETRY 1: CPT

## 2022-06-19 RX ORDER — CIPROFLOXACIN 500 MG/1
500 TABLET, FILM COATED ORAL EVERY 12 HOURS SCHEDULED
Qty: 15 TABLET | Refills: 0 | Status: SHIPPED | OUTPATIENT
Start: 2022-06-19 | End: 2022-06-27

## 2022-06-19 RX ORDER — TAMSULOSIN HYDROCHLORIDE 0.4 MG/1
0.4 CAPSULE ORAL
Qty: 30 CAPSULE | Refills: 0 | Status: SHIPPED | OUTPATIENT
Start: 2022-06-19

## 2022-06-19 RX ORDER — GABAPENTIN 100 MG/1
100 CAPSULE ORAL 3 TIMES DAILY
Qty: 90 CAPSULE | Refills: 0 | Status: SHIPPED | OUTPATIENT
Start: 2022-06-19

## 2022-06-19 RX ORDER — LIDOCAINE 50 MG/G
OINTMENT TOPICAL AS NEEDED
Qty: 35.44 G | Refills: 0 | Status: SHIPPED | OUTPATIENT
Start: 2022-06-19

## 2022-06-19 RX ORDER — IPRATROPIUM BROMIDE AND ALBUTEROL SULFATE 2.5; .5 MG/3ML; MG/3ML
3 SOLUTION RESPIRATORY (INHALATION) 3 TIMES DAILY
Qty: 270 ML | Refills: 0 | Status: SHIPPED | OUTPATIENT
Start: 2022-06-19

## 2022-06-19 RX ORDER — ACETAMINOPHEN 500 MG
1000 TABLET ORAL EVERY 8 HOURS SCHEDULED
Qty: 180 TABLET | Refills: 0 | Status: SHIPPED | OUTPATIENT
Start: 2022-06-19

## 2022-06-19 RX ADMIN — GABAPENTIN 100 MG: 100 CAPSULE ORAL at 08:08

## 2022-06-19 RX ADMIN — POTASSIUM CHLORIDE 20 MEQ: 1500 TABLET, EXTENDED RELEASE ORAL at 08:08

## 2022-06-19 RX ADMIN — ACETAMINOPHEN 975 MG: 325 TABLET, FILM COATED ORAL at 05:36

## 2022-06-19 RX ADMIN — LIDOCAINE 5% 1 PATCH: 700 PATCH TOPICAL at 08:09

## 2022-06-19 RX ADMIN — FERROUS SULFATE TAB 325 MG (65 MG ELEMENTAL FE) 325 MG: 325 (65 FE) TAB at 08:08

## 2022-06-19 RX ADMIN — IPRATROPIUM BROMIDE 0.5 MG: 0.5 SOLUTION RESPIRATORY (INHALATION) at 07:22

## 2022-06-19 RX ADMIN — CARVEDILOL 6.25 MG: 6.25 TABLET, FILM COATED ORAL at 08:08

## 2022-06-19 RX ADMIN — CIPROFLOXACIN 400 MG: 2 INJECTION, SOLUTION INTRAVENOUS at 01:13

## 2022-06-19 RX ADMIN — FUROSEMIDE 40 MG: 40 TABLET ORAL at 08:08

## 2022-06-19 RX ADMIN — INSULIN HUMAN 48 UNITS: 500 INJECTION, SOLUTION SUBCUTANEOUS at 09:24

## 2022-06-19 RX ADMIN — CYCLOBENZAPRINE HYDROCHLORIDE 10 MG: 10 TABLET, FILM COATED ORAL at 08:08

## 2022-06-19 RX ADMIN — HEPARIN SODIUM 5000 UNITS: 5000 INJECTION INTRAVENOUS; SUBCUTANEOUS at 05:36

## 2022-06-19 RX ADMIN — LEVALBUTEROL HYDROCHLORIDE 1.25 MG: 1.25 SOLUTION, CONCENTRATE RESPIRATORY (INHALATION) at 07:22

## 2022-06-19 RX ADMIN — LISINOPRIL 10 MG: 10 TABLET ORAL at 08:08

## 2022-06-19 RX ADMIN — ASPIRIN 81 MG: 81 TABLET, COATED ORAL at 08:08

## 2022-06-19 RX ADMIN — INSULIN LISPRO 4 UNITS: 100 INJECTION, SOLUTION INTRAVENOUS; SUBCUTANEOUS at 00:13

## 2022-06-19 RX ADMIN — OXYCODONE HYDROCHLORIDE 7.5 MG: 5 TABLET ORAL at 03:22

## 2022-06-19 NOTE — CASE MANAGEMENT
Case Management Discharge Planning Note    Patient name Aurther Card   Location [facility] 827/[facility] 985-03 MRN [id number]  : 1945 Date 2022       Current Admission Date: 6/15/2022  Current Admission Diagnosis:Acute cystitis without hematuria   Patient Active Problem List    Diagnosis Date Noted    Epididymitis, bilateral 06/15/2022    Acute cystitis without hematuria 06/15/2022    Acute respiratory failure with hypoxia ([facility] ) 2022    Chronic bilateral low back pain without sciatica 2022    Shortness of breath 2022    CKD (chronic kidney disease) stage 3, GFR 30-59 ml/min [facility] ) 2022    History of prostate cancer 2022    Adenocarcinoma of lung ([facility] ) 2022    Fracture of navicular bone of left foot 2021    Chronic respiratory failure with hypoxia ([facility] ) 04/15/2021    PAD (peripheral artery disease) ([facility] ) 2021    DDD (degenerative disc disease), lumbar 2020    Anemia, iron deficiency 2020    Lung nodule 2020    Pulmonary hypertension ([facility] ) 2019    JACQUELINE (obstructive sleep apnea) 2019    COPD with acute exacerbation ([facility] ) 2019    Oxygen dependent 2018    RBBB 2018    CAD (coronary artery disease) 2018    Chronic diastolic heart failure ([facility] ) 2018    Pleural effusion on right 10/31/2017    COPD, severe ([facility] ) 2017    Diabetic neuropathy ([facility] ) 2017    Essential hypertension 2016    Venous stasis dermatitis of both lower extremities 11/15/2016    Type 2 diabetes mellitus with hyperglycemia, with long-term current use of insulin ([facility] ) 2016    COPD exacerbation ([facility] ) 2016    HLD (hyperlipidemia) 2016      LOS (days): 4  Geometric Mean LOS (GMLOS) (days): 3 00  Days to GMLOS:-0 9     OBJECTIVE:  Risk of Unplanned Readmission Score: 39 75         Current admission status: Inpatient   Preferred Pharmacy:   [facility] [address] [facility], [address] [place] [address] [address]  Phone: [phone] Fax: [phone]    Primary Care Provider: Dalton Gee MD    Primary Insurance: [facility]  Secondary Insurance:     DISCHARGE DETAILS:                                          Other Referral/Resources/Interventions Provided:  Referral Comments: Pt cleared for dc today  Ecin message sent to [facility] [place]  Faxed AVS via [employer]  Pt states son will transport home  IMM reviewed with patient, patient agrees with discharge determination

## 2022-06-19 NOTE — ASSESSMENT & PLAN NOTE
Lab Results   Component Value Date    HGBA1C 9 2 (H) 04/22/2022       Recent Labs     06/18/22  1755 06/18/22  2119 06/19/22  0004 06/19/22  0537   POCGLU 86 159* 156* 113       Blood Sugar Average: Last 72 hrs:  (P) 932 [id number]   Continue U500  Monitor blood sugar  BSs high, endo appreciated  U500 switched back to tid  Per rosendo, d/c on home regimen

## 2022-06-19 NOTE — ASSESSMENT & PLAN NOTE
Patient presented with progressive scrotal pain for the past 2 days  Ultrasound with  Bilateral epididymitis and left-sided orchitis  Complex left hydrocele possibly reactive related to adjacent inflammation/infection  CT scan with cellulitis in the area of the mons pubis  Abnormal urinalysis  Patient afebrile with leukocytosis  IV ceftriaxone -> Cipro as U Cx grew [address] urology for further management - appreciated  Pain control and supportive care - c/w oxycodone 7 5 mg prn

## 2022-06-20 ENCOUNTER — PATIENT OUTREACH (OUTPATIENT)
Dept: FAMILY MEDICINE CLINIC | Facility: CLINIC | Age: 77
End: 2022-06-20

## 2022-06-20 PROBLEM — R06.02 SHORTNESS OF BREATH: Status: RESOLVED | Noted: 2022-04-21 | Resolved: 2022-06-20

## 2022-06-20 NOTE — UTILIZATION REVIEW
Notification of Discharge   This is a Notification of Discharge from our facility [address]  Please be advised that this patient has been discharge from our facility  Below you will find the admission and discharge date and time including the patients disposition  UTILIZATION REVIEW CONTACT:  Erwin Nava  Utilization   Network Utilization Review Department  Phone: [phone] x carefully listen to the prompts  All voicemails are confidential   Email: [email or web address]     PHYSICIAN ADVISORY SERVICES:  FOR ICLP-DV-HHMY REVIEW - MEDICAL NECESSITY DENIAL  Phone: [phone]  Fax: [phone]  Email: [email or web address]     PRESENTATION DATE: 6/15/2022  5:05 AM  OBERVATION ADMISSION DATE: INPATIENT ADMISSION DATE: 6/15/22 12:03 PM   DISCHARGE DATE: 6/19/2022 11:56 AM  DISPOSITION: Home with [place] with [address] INFORMATION:  Send all requests for admission clinical reviews, approved or denied determinations and any other requests to dedicated fax number below belonging to the campus where the patient is receiving treatment   List of dedicated fax numbers:  [address] DENIALS (Administrative/Medical Necessity) [phone]   [address] (Maternity/NICU/Pediatrics) [phone]   [place] [phone]   [address] [phone]   [address] [place] [address] [phone]   [address] [place] [address] [address] [place] [address],[address] [phone]   [facility]  [phone]   [address] [place], [place] [address] [facility] [address] [facility] [phone]

## 2022-06-20 NOTE — PROGRESS NOTES
Called the patient  Cell phone is still out of service  Reached the patient on his home number which is his son's cell  Patient's son states they are working on getting a new phone for the patient, but we can use his number for now  Introduced the patient to complex care management  He agrees to continued outreach  Patient had the wrong date for his PCP appointment  He agrees to reschedule  Patient just woke up & was unable to review his meds  Per patient & son, the patient manages his meds & appointments  The son provides transportation  Son made aware of tomorrow's pulmonary appointment  He suggests calling back around noon later this week  Reminder set

## 2022-06-21 ENCOUNTER — TRANSITIONAL CARE MANAGEMENT (OUTPATIENT)
Dept: FAMILY MEDICINE CLINIC | Facility: CLINIC | Age: 77
End: 2022-06-21

## 2022-06-23 ENCOUNTER — PATIENT OUTREACH (OUTPATIENT)
Dept: FAMILY MEDICINE CLINIC | Facility: CLINIC | Age: 77
End: 2022-06-23

## 2022-06-23 NOTE — PROGRESS NOTES
Followed up with the patient via his son's cell  Re-introduced complex care management  The patient is not interested in participating  Patient does state he still has not been contacted by [facility]  They did not have a working phone number to reach the patient  Provided them with the son's number  They will call to schedule start of care  Called the patient back to make him aware  Patient states understanding  Offered to provide the patient with this RN's number should he change his mind about participating in care management  Patient declined stating he would not change his mind  Patient closed to care management at this time

## 2022-07-05 ENCOUNTER — APPOINTMENT (OUTPATIENT)
Dept: RADIOLOGY | Facility: HOSPITAL | Age: 77
DRG: 552 | End: 2022-07-05
Payer: COMMERCIAL

## 2022-07-05 ENCOUNTER — APPOINTMENT (EMERGENCY)
Dept: RADIOLOGY | Facility: HOSPITAL | Age: 77
DRG: 552 | End: 2022-07-05
Payer: COMMERCIAL

## 2022-07-05 ENCOUNTER — TELEPHONE (OUTPATIENT)
Dept: RADIOLOGY | Facility: HOSPITAL | Age: 77
End: 2022-07-05

## 2022-07-05 ENCOUNTER — HOSPITAL ENCOUNTER (INPATIENT)
Facility: HOSPITAL | Age: 77
LOS: 1 days | Discharge: HOME HEALTH | DRG: 552 | End: 2022-07-07
Attending: EMERGENCY MEDICINE | Admitting: INTERNAL MEDICINE
Payer: COMMERCIAL

## 2022-07-05 DIAGNOSIS — R26.2 AMBULATORY DYSFUNCTION: Primary | ICD-10-CM

## 2022-07-05 DIAGNOSIS — M54.9 BACK PAIN: ICD-10-CM

## 2022-07-05 DIAGNOSIS — R32 URINARY INCONTINENCE: ICD-10-CM

## 2022-07-05 LAB
ALBUMIN SERPL BCP-MCNC: 3 G/DL (ref 3.5–5)
ALP SERPL-CCNC: 85 U/L (ref 46–116)
ALT SERPL W P-5'-P-CCNC: 24 U/L (ref 12–78)
ANION GAP SERPL CALCULATED.3IONS-SCNC: 6 MMOL/L (ref 4–13)
AST SERPL W P-5'-P-CCNC: 17 U/L (ref 5–45)
BACTERIA UR QL AUTO: ABNORMAL /HPF
BASOPHILS # BLD AUTO: 0.04 THOUSANDS/ΜL (ref 0–0.1)
BASOPHILS NFR BLD AUTO: 0 % (ref 0–1)
BILIRUB SERPL-MCNC: 0.28 MG/DL (ref 0.2–1)
BILIRUB UR QL STRIP: NEGATIVE
BUN SERPL-MCNC: 35 MG/DL (ref 5–25)
CALCIUM ALBUM COR SERPL-MCNC: 9.9 MG/DL (ref 8.3–10.1)
CALCIUM SERPL-MCNC: 9.1 MG/DL (ref 8.3–10.1)
CHLORIDE SERPL-SCNC: 102 MMOL/L (ref 100–108)
CLARITY UR: CLEAR
CO2 SERPL-SCNC: 29 MMOL/L (ref 21–32)
COLOR UR: ABNORMAL
CREAT SERPL-MCNC: 1.74 MG/DL (ref 0.6–1.3)
EOSINOPHIL # BLD AUTO: 0.24 THOUSAND/ΜL (ref 0–0.61)
EOSINOPHIL NFR BLD AUTO: 3 % (ref 0–6)
ERYTHROCYTE [DISTWIDTH] IN BLOOD BY AUTOMATED COUNT: 16.8 % (ref 11.6–15.1)
GFR SERPL CREATININE-BSD FRML MDRD: 37 ML/MIN/1.73SQ M
GLUCOSE SERPL-MCNC: 290 MG/DL (ref 65–140)
GLUCOSE UR STRIP-MCNC: ABNORMAL MG/DL
HCT VFR BLD AUTO: 38 % (ref 36.5–49.3)
HGB BLD-MCNC: 11.8 G/DL (ref 12–17)
HGB UR QL STRIP.AUTO: NEGATIVE
IMM GRANULOCYTES # BLD AUTO: 0.06 THOUSAND/UL (ref 0–0.2)
IMM GRANULOCYTES NFR BLD AUTO: 1 % (ref 0–2)
KETONES UR STRIP-MCNC: NEGATIVE MG/DL
LEUKOCYTE ESTERASE UR QL STRIP: NEGATIVE
LYMPHOCYTES # BLD AUTO: 1.46 THOUSANDS/ΜL (ref 0.6–4.47)
LYMPHOCYTES NFR BLD AUTO: 16 % (ref 14–44)
MCH RBC QN AUTO: 25.3 PG (ref 26.8–34.3)
MCHC RBC AUTO-ENTMCNC: 31.1 G/DL (ref 31.4–37.4)
MCV RBC AUTO: 81 FL (ref 82–98)
MONOCYTES # BLD AUTO: 0.69 THOUSAND/ΜL (ref 0.17–1.22)
MONOCYTES NFR BLD AUTO: 8 % (ref 4–12)
NEUTROPHILS # BLD AUTO: 6.52 THOUSANDS/ΜL (ref 1.85–7.62)
NEUTS SEG NFR BLD AUTO: 72 % (ref 43–75)
NITRITE UR QL STRIP: NEGATIVE
NON-SQ EPI CELLS URNS QL MICRO: ABNORMAL /HPF
NRBC BLD AUTO-RTO: 0 /100 WBCS
PH UR STRIP.AUTO: 6 [PH]
PLATELET # BLD AUTO: 152 THOUSANDS/UL (ref 149–390)
PMV BLD AUTO: 10.5 FL (ref 8.9–12.7)
POTASSIUM SERPL-SCNC: 4.6 MMOL/L (ref 3.5–5.3)
PROT SERPL-MCNC: 7.3 G/DL (ref 6.4–8.2)
PROT UR STRIP-MCNC: ABNORMAL MG/DL
RBC # BLD AUTO: 4.67 MILLION/UL (ref 3.88–5.62)
RBC #/AREA URNS AUTO: ABNORMAL /HPF
SODIUM SERPL-SCNC: 137 MMOL/L (ref 136–145)
SP GR UR STRIP.AUTO: 1.01 (ref 1–1.03)
UROBILINOGEN UR STRIP-ACNC: <2 MG/DL
WBC # BLD AUTO: 9.01 THOUSAND/UL (ref 4.31–10.16)
WBC #/AREA URNS AUTO: ABNORMAL /HPF

## 2022-07-05 PROCEDURE — 99285 EMERGENCY DEPT VISIT HI MDM: CPT | Performed by: EMERGENCY MEDICINE

## 2022-07-05 PROCEDURE — 74176 CT ABD & PELVIS W/O CONTRAST: CPT

## 2022-07-05 PROCEDURE — G1004 CDSM NDSC: HCPCS

## 2022-07-05 PROCEDURE — 36415 COLL VENOUS BLD VENIPUNCTURE: CPT | Performed by: EMERGENCY MEDICINE

## 2022-07-05 PROCEDURE — 99285 EMERGENCY DEPT VISIT HI MDM: CPT

## 2022-07-05 PROCEDURE — 80053 COMPREHEN METABOLIC PANEL: CPT | Performed by: EMERGENCY MEDICINE

## 2022-07-05 PROCEDURE — 85025 COMPLETE CBC W/AUTO DIFF WBC: CPT | Performed by: EMERGENCY MEDICINE

## 2022-07-05 PROCEDURE — 81001 URINALYSIS AUTO W/SCOPE: CPT | Performed by: EMERGENCY MEDICINE

## 2022-07-05 PROCEDURE — 70030 X-RAY EYE FOR FOREIGN BODY: CPT

## 2022-07-05 RX ORDER — ONDANSETRON 2 MG/ML
4 INJECTION INTRAMUSCULAR; INTRAVENOUS EVERY 6 HOURS PRN
Status: DISCONTINUED | OUTPATIENT
Start: 2022-07-05 | End: 2022-07-07 | Stop reason: HOSPADM

## 2022-07-05 RX ORDER — ENOXAPARIN SODIUM 100 MG/ML
40 INJECTION SUBCUTANEOUS DAILY
Status: DISCONTINUED | OUTPATIENT
Start: 2022-07-06 | End: 2022-07-07 | Stop reason: HOSPADM

## 2022-07-05 RX ORDER — ACETAMINOPHEN 325 MG/1
650 TABLET ORAL ONCE
Status: COMPLETED | OUTPATIENT
Start: 2022-07-05 | End: 2022-07-05

## 2022-07-05 RX ORDER — SODIUM CHLORIDE, SODIUM GLUCONATE, SODIUM ACETATE, POTASSIUM CHLORIDE, MAGNESIUM CHLORIDE, SODIUM PHOSPHATE, DIBASIC, AND POTASSIUM PHOSPHATE .53; .5; .37; .037; .03; .012; .00082 G/100ML; G/100ML; G/100ML; G/100ML; G/100ML; G/100ML; G/100ML
100 INJECTION, SOLUTION INTRAVENOUS CONTINUOUS
Status: DISCONTINUED | OUTPATIENT
Start: 2022-07-05 | End: 2022-07-06

## 2022-07-05 RX ORDER — POTASSIUM CHLORIDE 20 MEQ/1
20 TABLET, EXTENDED RELEASE ORAL DAILY
Status: DISCONTINUED | OUTPATIENT
Start: 2022-07-06 | End: 2022-07-07 | Stop reason: HOSPADM

## 2022-07-05 RX ORDER — ACETAMINOPHEN 325 MG/1
975 TABLET ORAL EVERY 8 HOURS SCHEDULED
Status: DISCONTINUED | OUTPATIENT
Start: 2022-07-05 | End: 2022-07-07 | Stop reason: HOSPADM

## 2022-07-05 RX ORDER — CYCLOBENZAPRINE HCL 10 MG
10 TABLET ORAL 2 TIMES DAILY
Status: DISCONTINUED | OUTPATIENT
Start: 2022-07-05 | End: 2022-07-07 | Stop reason: HOSPADM

## 2022-07-05 RX ORDER — FUROSEMIDE 40 MG/1
40 TABLET ORAL DAILY
Status: DISCONTINUED | OUTPATIENT
Start: 2022-07-06 | End: 2022-07-07 | Stop reason: HOSPADM

## 2022-07-05 RX ORDER — FERROUS SULFATE 325(65) MG
325 TABLET ORAL
Status: DISCONTINUED | OUTPATIENT
Start: 2022-07-06 | End: 2022-07-07 | Stop reason: HOSPADM

## 2022-07-05 RX ORDER — MAGNESIUM HYDROXIDE/ALUMINUM HYDROXICE/SIMETHICONE 120; 1200; 1200 MG/30ML; MG/30ML; MG/30ML
30 SUSPENSION ORAL EVERY 6 HOURS PRN
Status: DISCONTINUED | OUTPATIENT
Start: 2022-07-05 | End: 2022-07-07 | Stop reason: HOSPADM

## 2022-07-05 RX ORDER — HYDROMORPHONE HCL/PF 1 MG/ML
0.5 SYRINGE (ML) INJECTION ONCE
Status: COMPLETED | OUTPATIENT
Start: 2022-07-05 | End: 2022-07-05

## 2022-07-05 RX ORDER — ASPIRIN 81 MG/1
81 TABLET ORAL DAILY
Status: DISCONTINUED | OUTPATIENT
Start: 2022-07-06 | End: 2022-07-07 | Stop reason: HOSPADM

## 2022-07-05 RX ORDER — HYDROMORPHONE HCL/PF 1 MG/ML
0.5 SYRINGE (ML) INJECTION
Status: DISCONTINUED | OUTPATIENT
Start: 2022-07-05 | End: 2022-07-07 | Stop reason: HOSPADM

## 2022-07-05 RX ORDER — CARVEDILOL 6.25 MG/1
6.25 TABLET ORAL 2 TIMES DAILY WITH MEALS
Status: DISCONTINUED | OUTPATIENT
Start: 2022-07-06 | End: 2022-07-07 | Stop reason: HOSPADM

## 2022-07-05 RX ORDER — PRAVASTATIN SODIUM 80 MG/1
80 TABLET ORAL
Status: DISCONTINUED | OUTPATIENT
Start: 2022-07-06 | End: 2022-07-07 | Stop reason: HOSPADM

## 2022-07-05 RX ORDER — OXYCODONE HYDROCHLORIDE 5 MG/1
5 TABLET ORAL EVERY 4 HOURS PRN
Status: DISCONTINUED | OUTPATIENT
Start: 2022-07-05 | End: 2022-07-07 | Stop reason: HOSPADM

## 2022-07-05 RX ORDER — TAMSULOSIN HYDROCHLORIDE 0.4 MG/1
0.4 CAPSULE ORAL
Status: DISCONTINUED | OUTPATIENT
Start: 2022-07-06 | End: 2022-07-07 | Stop reason: HOSPADM

## 2022-07-05 RX ORDER — OXYCODONE HYDROCHLORIDE 5 MG/1
2.5 TABLET ORAL EVERY 4 HOURS PRN
Status: DISCONTINUED | OUTPATIENT
Start: 2022-07-05 | End: 2022-07-05

## 2022-07-05 RX ORDER — DOCUSATE SODIUM 100 MG/1
100 CAPSULE, LIQUID FILLED ORAL 2 TIMES DAILY PRN
Status: DISCONTINUED | OUTPATIENT
Start: 2022-07-05 | End: 2022-07-07 | Stop reason: HOSPADM

## 2022-07-05 RX ORDER — GABAPENTIN 100 MG/1
100 CAPSULE ORAL 3 TIMES DAILY
Status: DISCONTINUED | OUTPATIENT
Start: 2022-07-05 | End: 2022-07-07 | Stop reason: HOSPADM

## 2022-07-05 RX ORDER — LISINOPRIL 10 MG/1
10 TABLET ORAL DAILY
Status: DISCONTINUED | OUTPATIENT
Start: 2022-07-06 | End: 2022-07-07 | Stop reason: HOSPADM

## 2022-07-05 RX ORDER — OXYCODONE HYDROCHLORIDE 10 MG/1
10 TABLET ORAL EVERY 4 HOURS PRN
Status: DISCONTINUED | OUTPATIENT
Start: 2022-07-05 | End: 2022-07-07 | Stop reason: HOSPADM

## 2022-07-05 RX ORDER — LIDOCAINE 50 MG/G
1 PATCH TOPICAL ONCE
Status: COMPLETED | OUTPATIENT
Start: 2022-07-05 | End: 2022-07-06

## 2022-07-05 RX ADMIN — CYCLOBENZAPRINE HYDROCHLORIDE 10 MG: 10 TABLET, FILM COATED ORAL at 22:19

## 2022-07-05 RX ADMIN — ACETAMINOPHEN 650 MG: 325 TABLET, FILM COATED ORAL at 19:48

## 2022-07-05 RX ADMIN — LIDOCAINE 5% 1 PATCH: 700 PATCH TOPICAL at 19:49

## 2022-07-05 RX ADMIN — HYDROMORPHONE HYDROCHLORIDE 0.5 MG: 1 INJECTION, SOLUTION INTRAMUSCULAR; INTRAVENOUS; SUBCUTANEOUS at 22:19

## 2022-07-05 RX ADMIN — GABAPENTIN 100 MG: 100 CAPSULE ORAL at 22:20

## 2022-07-05 RX ADMIN — SODIUM CHLORIDE, SODIUM GLUCONATE, SODIUM ACETATE, POTASSIUM CHLORIDE, MAGNESIUM CHLORIDE, SODIUM PHOSPHATE, DIBASIC, AND POTASSIUM PHOSPHATE 100 ML/HR: .53; .5; .37; .037; .03; .012; .00082 INJECTION, SOLUTION INTRAVENOUS at 22:23

## 2022-07-05 NOTE — ED PROVIDER NOTES
History  Chief Complaint   Patient presents with    Back Pain     Patient complains of left lower back pain  Denies any injury  HPI  Patient 78year old male presenting with acute on chronic back pain  Hx of insulin dependent diabetes, DDD of lumbar spine, MI with 3 stents, prostate cancer and lung cancer  Patient states that past couple of days the left lower back pain got increasingly worse and he also experienced urinary incontinence yesterday which is not his baseline  States that due to pain he is having trouble ambulating  Patient otherwise has normal bowel function and also does not have any new lower extremity weakness  Denies any IV drug use, recent infection or blood thinners  Patient denies any urinary symptoms, fever, chills, n/v, diarrea  Prior to Admission Medications   Prescriptions Last Dose Informant Patient Reported? Taking? BD Insulin Syringe U-500 31G X 6MM 0 5 ML MISC 7/5/2022 at Unknown time  No Yes   Sig: USE 1 SYRINGE THREE TIMES A DAY FOR INJECTING INSULIN   acetaminophen (TYLENOL) 500 mg tablet Not Taking at Unknown time  No No   Sig: Take 2 tablets (1,000 mg total) by mouth every 8 (eight) hours   Patient not taking: Reported on 7/5/2022   aspirin (ECOTRIN LOW STRENGTH) 81 mg EC tablet 7/5/2022 at Unknown time  No Yes   Sig: Take 1 tablet (81 mg total) by mouth daily   carvedilol (COREG) 6 25 mg tablet 7/5/2022 at Unknown time  No Yes   Sig: Take 1 tablet (6 25 mg total) by mouth 2 (two) times a day with meals   cyclobenzaprine (FLEXERIL) 10 mg tablet 7/5/2022 at Unknown time  No Yes   Sig: Take 1 tablet (10 mg total) by mouth in the morning and 1 tablet (10 mg total) before bedtime  ferrous sulfate 325 (65 Fe) mg tablet 7/5/2022 at Unknown time  No Yes   Sig: Take 1 tablet (325 mg total) by mouth daily with breakfast   fluticasone-umeclidinium-vilanterol (Trelegy Ellipta) 100-62 5-25 MCG/INH inhaler   No No   Sig: Inhale 1 puff  in the morning  Rinse mouth after use  Haleigh Skipper furosemide (LASIX) 40 mg tablet 7/5/2022 at Unknown time  No Yes   Sig: Take 1 tablet (40 mg total) by mouth daily   gabapentin (NEURONTIN) 100 mg capsule 7/5/2022 at Unknown time  No Yes   Sig: Take 1 capsule (100 mg total) by mouth 3 (three) times a day   glucose blood (OneTouch Verio) test strip 7/5/2022 at Unknown time  No Yes   Sig: May substitute brand based on insurance coverage  Check glucose QID  insulin regular (HUMULIN R) 500 units/mL CONCENTRATED injection 7/5/2022 at Unknown time  No Yes   Sig: Inject 0 09 mL (45 Units total) under the skin in the morning and 0 09 mL (45 Units total) at noon and 0 09 mL (45 Units total) in the evening  Inject before meals  ipratropium-albuterol (DUO-NEB) 0 5-2 5 mg/3 mL nebulizer solution 7/5/2022 at Unknown time  No Yes   Sig: Take 3 mL by nebulization 3 (three) times a day   lidocaine (XYLOCAINE) 5 % ointment Past Week at Unknown time  No Yes   Sig: Apply topically as needed for mild pain   lisinopril (ZESTRIL) 10 mg tablet 7/5/2022 at Unknown time  No Yes   Sig: Take 1 tablet (10 mg total) by mouth daily   oxyCODONE 7 5 MG TABS 7/5/2022 at Unknown time  No Yes   Sig: Take 7 5 mg by mouth 2 (two) times a day as needed for severe pain Max Daily Amount: 15 mg   potassium chloride (K-DUR,KLOR-CON) 20 mEq tablet 7/5/2022 at Unknown time  No Yes   Sig: Take 1 tablet (20 mEq total) by mouth in the morning  simvastatin (ZOCOR) 40 mg tablet 7/5/2022 at Unknown time  No Yes   Sig: Take 1 tablet (40 mg total) by mouth in the morning     tamsulosin (FLOMAX) 0 4 mg Unknown at Unknown time  No No   Sig: Take 1 capsule (0 4 mg total) by mouth daily with dinner      Facility-Administered Medications: None       Past Medical History:   Diagnosis Date    Abdominal pain     [facility]ANDA [facility] kidney injury) ([facility] ) 6/19/2018    Cardiac disease     [facility]     COPD, severe ([facility] )     Coronary artery disease     DDD (degenerative disc disease), lumbar 2020    Diabetes mellitus (HCC)     Dyspnea     GERD (gastroesophageal reflux disease)     Hyperlipidemia     Hypertension     MI (myocardial infarction) ([address] )     with 3 stents    Nodule of apex of right lung     JACQUELINE (obstructive sleep apnea)     Prostate cancer [facility])        Past Surgical History:   Procedure Laterality Date    ABDOMINAL SURGERY      exploratory    ANGIOPLASTY      3 stents    APPENDECTOMY      COLONOSCOPY      CT NEEDLE BIOPSY LUNG  11/3/2020    ESOPHAGOGASTRODUODENOSCOPY N/A 10/2/2017    Procedure: ESOPHAGOGASTRODUODENOSCOPY (EGD); Surgeon: Manju Ortiz MD;  [facility]; Service: Gastroenterology    IR THORACENTESIS  11/3/2020    KNEE CARTILAGE SURGERY      OTHER SURGICAL HISTORY      stent placement    PROSTATE SURGERY      SKIN GRAFT      Basal cell CA back       Family History   Problem Relation Age of Onset    Heart disease Father     Other Father         Mesothelioma      I have reviewed and agree with the history as documented  E-Cigarette/Vaping    E-Cigarette Use Never User      E-Cigarette/Vaping Substances    Nicotine No     THC No     CBD No     Flavoring No     Other No     Unknown No      Social History     Tobacco Use    Smoking status: Former Smoker     Packs/day: 1 50     Years: 50 00     Pack years: 75 00     Start date: 38     Quit date: 2020     Years since quittin 9    Smokeless tobacco: Never Used   Vaping Use    Vaping Use: Never used   Substance Use Topics    Alcohol use: Never    Drug use: No        Review of Systems   Constitutional: Negative for chills, diaphoresis, fever and unexpected weight change  HENT: Negative for ear pain and sore throat  Eyes: Negative for visual disturbance  Respiratory: Negative for cough, chest tightness and shortness of breath  Cardiovascular: Negative for chest pain and leg swelling     Gastrointestinal: Negative for abdominal distention, abdominal pain, constipation, diarrhea, nausea and vomiting  Endocrine: Negative  Genitourinary: Negative for dysuria  Incontinence     Musculoskeletal: Positive for back pain  Skin: Negative  Allergic/Immunologic: Negative  Neurological: Negative  Hematological: Negative  Psychiatric/Behavioral: Negative  All other systems reviewed and are negative  Physical Exam  ED Triage Vitals [07/05/22 1733]   Temperature Pulse Respirations Blood Pressure SpO2   (!) 97 2 °F (36 2 °C) 105 18 127/73 95 %      Temp Source Heart Rate Source Patient Position - Orthostatic VS BP Location FiO2 (%)   Temporal Monitor Sitting Left arm --      Pain Score       9             Orthostatic Vital Signs  Vitals:    07/05/22 2158 07/06/22 0739 07/06/22 1809 07/06/22 2210   BP: 130/62 169/79 (!) 180/85 155/71   Pulse: 101 87 93    Patient Position - Orthostatic VS:   Sitting        Physical Exam  Vitals reviewed  Constitutional:       General: He is not in acute distress  Appearance: Normal appearance  He is not ill-appearing  HENT:      Head: Normocephalic and atraumatic  Right Ear: External ear normal       Left Ear: External ear normal       Nose: Nose normal       Mouth/Throat:      Mouth: Mucous membranes are moist       Pharynx: Oropharynx is clear  Eyes:      General: No scleral icterus  Right eye: No discharge  Left eye: No discharge  Extraocular Movements: Extraocular movements intact  Conjunctiva/sclera: Conjunctivae normal       Pupils: Pupils are equal, round, and reactive to light  Cardiovascular:      Rate and Rhythm: Normal rate and regular rhythm  Pulses: Normal pulses  Heart sounds: Normal heart sounds  Pulmonary:      Effort: Pulmonary effort is normal       Breath sounds: Normal breath sounds  Abdominal:      General: Abdomen is flat  Bowel sounds are normal  There is no distension  Palpations: Abdomen is soft  Tenderness: There is no abdominal tenderness  There is no guarding or rebound  Musculoskeletal:         General: Tenderness (Left lower back) present  Normal range of motion  Cervical back: Normal range of motion and neck supple  Skin:     General: Skin is warm and dry  Capillary Refill: Capillary refill takes less than 2 seconds  Neurological:      General: No focal deficit present  Mental Status: He is alert and oriented to person, place, and time  Mental status is at baseline  Psychiatric:         Mood and Affect: Mood normal          Behavior: Behavior normal          Thought Content:  Thought content normal          Judgment: Judgment normal          ED Medications  Medications   acetaminophen (TYLENOL) tablet 975 mg (975 mg Oral Given 7/6/22 2119)   aspirin (ECOTRIN LOW STRENGTH) EC tablet 81 mg (81 mg Oral Given 7/6/22 0836)   carvedilol (COREG) tablet 6 25 mg (6 25 mg Oral Given 7/6/22 1800)   cyclobenzaprine (FLEXERIL) tablet 10 mg (10 mg Oral Given 7/6/22 2119)   ferrous sulfate tablet 325 mg (325 mg Oral Given 7/6/22 0836)   fluticasone-vilanterol (BREO ELLIPTA) 100-25 mcg/inh inhaler 1 puff (1 puff Inhalation Given 7/6/22 0837)   furosemide (LASIX) tablet 40 mg (40 mg Oral Given 7/6/22 0836)   gabapentin (NEURONTIN) capsule 100 mg (100 mg Oral Given 7/6/22 2119)   insulin regular (HumuLIN R U-500) 500 units/mL CONCENTRATED injection 45 Units (45 Units Subcutaneous Given 7/6/22 1800)   umeclidinium bromide (INCRUSE ELLIPTA) 62 5 mcg/inh inhaler AEPB 1 puff (1 puff Inhalation Given 7/6/22 0837)   lisinopril (ZESTRIL) tablet 10 mg (10 mg Oral Given 7/6/22 0836)   potassium chloride (K-DUR,KLOR-CON) CR tablet 20 mEq (20 mEq Oral Given 7/6/22 0836)   pravastatin (PRAVACHOL) tablet 80 mg (80 mg Oral Given 7/6/22 1800)   tamsulosin (FLOMAX) capsule 0 4 mg (0 4 mg Oral Given 7/6/22 1759)   docusate sodium (COLACE) capsule 100 mg (100 mg Oral Not Given 7/6/22 1810)   ondansetron (ZOFRAN) injection 4 mg (has no administration in time range)   aluminum-magnesium hydroxide-simethicone (MYLANTA) oral suspension 30 mL (has no administration in time range)   enoxaparin (LOVENOX) subcutaneous injection 40 mg (40 mg Subcutaneous Given 7/6/22 0836)   HYDROmorphone (DILAUDID) injection 0 5 mg (has no administration in time range)   oxyCODONE (ROXICODONE) immediate release tablet 10 mg (has no administration in time range)   oxyCODONE (ROXICODONE) IR tablet 5 mg (has no administration in time range)   insulin lispro (HumaLOG) 100 units/mL subcutaneous injection 1-6 Units (1 Units Subcutaneous Not Given 7/6/22 1808)   insulin lispro (HumaLOG) 100 units/mL subcutaneous injection 1-5 Units (1 Units Subcutaneous Not Given 7/6/22 2126)   acetaminophen (TYLENOL) tablet 650 mg (650 mg Oral Given 7/5/22 1948)   lidocaine (LIDODERM) 5 % patch 1 patch (1 patch Topical Patch Removed 7/6/22 0837)   HYDROmorphone (DILAUDID) injection 0 5 mg (0 5 mg Intravenous Given 7/5/22 2219)   LORazepam (ATIVAN) injection 1 mg (1 mg Intravenous Given 7/6/22 0302)       Diagnostic Studies  Results Reviewed     Procedure Component Value Units Date/Time    Comprehensive metabolic panel [[id number]]  (Abnormal) Collected: 07/06/22 0437    Lab Status: Final result Specimen: Blood from Arm, Left Updated: 07/06/22 0602     Sodium 135 mmol/L      Potassium 4 2 mmol/L      Chloride 100 mmol/L      CO2 28 mmol/L      ANION GAP 7 mmol/L      BUN 39 mg/dL      Creatinine 1 60 mg/dL      Glucose 394 mg/dL      Calcium 8 1 mg/dL      Corrected Calcium 9 3 mg/dL      AST 11 U/L      ALT 19 U/L      Alkaline Phosphatase 72 U/L      Total Protein 6 5 g/dL      Albumin 2 5 g/dL      Total Bilirubin 0 31 mg/dL      eGFR 41 ml/min/1 73sq m     Narrative:      [employer] guidelines for Chronic Kidney Disease (CKD):     Stage 1 with normal or high GFR (GFR > 90 mL/min/1 73 square meters)    Stage 2 Mild CKD (GFR = 60-89 mL/min/1 73 square meters)    Stage 3A Moderate CKD (GFR = 45-59 mL/min/1 73 square meters)    Stage 3B Moderate CKD (GFR = 30-44 mL/min/1 73 square meters)    Stage 4 Severe CKD (GFR = 15-29 mL/min/1 73 square meters)    Stage 5 End Stage CKD (GFR <15 mL/min/1 73 square meters)  Note: GFR calculation is accurate only with a steady state creatinine    Phosphorus [[id number]]  (Normal) Collected: 07/06/22 0437    Lab Status: Final result Specimen: Blood from Arm, Left Updated: 07/06/22 0555     Phosphorus 3 4 mg/dL     Magnesium [[id number]]  (Normal) Collected: 07/06/22 0437    Lab Status: Final result Specimen: Blood from Arm, Left Updated: 07/06/22 0555     Magnesium 2 0 mg/dL     CBC and differential [[id number]]  (Abnormal) Collected: 07/06/22 0437    Lab Status: Final result Specimen: Blood from Arm, Left Updated: 07/06/22 0525     WBC 6 17 Thousand/uL      RBC 3 99 Million/uL      Hemoglobin 10 2 g/dL      Hematocrit 32 2 %      MCV 81 fL      MCH 25 6 pg      MCHC 31 7 g/dL      RDW 16 7 %      MPV 10 7 fL      Platelets 312 Thousands/uL      nRBC 0 /100 WBCs      Neutrophils Relative 65 %      Immat GRANS % 1 %      Lymphocytes Relative 19 %      Monocytes Relative 11 %      Eosinophils Relative 3 %      Basophils Relative 1 %      Neutrophils Absolute 4 05 Thousands/µL      Immature Grans Absolute 0 04 Thousand/uL      Lymphocytes Absolute 1 20 Thousands/µL      Monocytes Absolute 0 68 Thousand/µL      Eosinophils Absolute 0 17 Thousand/µL      Basophils Absolute 0 03 Thousands/µL     Comprehensive metabolic panel [[id number]]  (Abnormal) Collected: 07/05/22 1946    Lab Status: Final result Specimen: Blood from Arm, Right Updated: 07/05/22 2032     Sodium 137 mmol/L      Potassium 4 6 mmol/L      Chloride 102 mmol/L      CO2 29 mmol/L      ANION GAP 6 mmol/L      BUN 35 mg/dL      Creatinine 1 74 mg/dL      Glucose 290 mg/dL      Calcium 9 1 mg/dL      Corrected Calcium 9 9 mg/dL      AST 17 U/L      ALT 24 U/L      Alkaline Phosphatase 85 U/L      Total Protein 7 3 g/dL      Albumin 3 0 g/dL      Total Bilirubin 0 28 mg/dL      eGFR 37 ml/min/1 73sq m     Narrative:      National Kidney Disease Foundation guidelines for Chronic Kidney Disease (CKD):     Stage 1 with normal or high GFR (GFR > 90 mL/min/1 73 square meters)    Stage 2 Mild CKD (GFR = 60-89 mL/min/1 73 square meters)    Stage 3A Moderate CKD (GFR = 45-59 mL/min/1 73 square meters)    Stage 3B Moderate CKD (GFR = 30-44 mL/min/1 73 square meters)    Stage 4 Severe CKD (GFR = 15-29 mL/min/1 73 square meters)    Stage 5 End Stage CKD (GFR <15 mL/min/1 73 square meters)  Note: GFR calculation is accurate only with a steady state creatinine    Urine Microscopic [[id number]]  (Abnormal) Collected: 07/05/22 1946    Lab Status: Final result Specimen: Urine, Clean Catch Updated: 07/05/22 2025     RBC, UA 1-2 /hpf      WBC, UA 2-4 /hpf      Epithelial Cells Occasional /hpf      Bacteria, UA Occasional /hpf     UA (URINE) with reflex to Scope [[id number]]  (Abnormal) Collected: 07/05/22 1946    Lab Status: Final result Specimen: Urine, Clean Catch Updated: 07/05/22 2023     Color, UA Light Yellow     Clarity, UA Clear     Specific Gravity, UA 1 015     pH, UA 6 0     Leukocytes, UA Negative     Nitrite, UA Negative     Protein,  (2+) mg/dl      Glucose, UA 70 (7/100%) mg/dl      Ketones, UA Negative mg/dl      Urobilinogen, UA <2 0 mg/dl      Bilirubin, UA Negative     Occult Blood, UA Negative    CBC and differential [[id number]]  (Abnormal) Collected: 07/05/22 1946    Lab Status: Final result Specimen: Blood from Arm, Right Updated: 07/05/22 2011     WBC 9 01 Thousand/uL      RBC 4 67 Million/uL      Hemoglobin 11 8 g/dL      Hematocrit 38 0 %      MCV 81 fL      MCH 25 3 pg      MCHC 31 1 g/dL      RDW 16 8 %      MPV 10 5 fL      Platelets 149 Thousands/uL      nRBC 0 /100 WBCs      Neutrophils Relative 72 %      Immat GRANS % 1 %      Lymphocytes Relative 16 %      Monocytes Relative 8 %      Eosinophils Relative 3 %      Basophils Relative 0 %      Neutrophils Absolute 6 52 Thousands/µL      Immature Grans Absolute 0 06 Thousand/uL      Lymphocytes Absolute 1 46 Thousands/µL      Monocytes Absolute 0 69 Thousand/µL      Eosinophils Absolute 0 24 Thousand/µL      Basophils Absolute 0 04 Thousands/µL                  MRI lumbar spine wo contrast   Final Result by Lenora Rodriguez MD (07/06 1026)      1  Please note that there is transitional lumbosacral anatomy  There are 6 non-rib bearing lumbar-type vertebrae  The transitional lumbosacral vertebra is designated as S1 in this report for the purposes of dictation  2   Mild degenerative change without high-grade canal or foraminal stenosis as detailed  Workstation performed: [id number]         XR orbits for foreign body   Final Result by Homero Zamorano MD (07/06 0009)      No radiopaque orbital foreign body  Punctate metallic density noted within the soft tissues along the anterior left lower jaw of uncertain etiology/clinical significance  Correlate clinically  Workstation performed: [id number]         CT renal stone study abdomen pelvis wo contrast   Final Result by Homero Zamorano MD (07/05 2113)      No evidence of obstructive uropathy or nephroureterolithiasis  Redemonstrated small right pleural effusion and probable rounded atelectasis at the right base similar in appearance to prior  Workstation performed: [id number]               Procedures  Procedures      ED Course               Identification of Seniors at [facility] Most Recent Value   (ISAR) Identification of Seniors at Risk    Before the illness or injury that brought you to the Emergency, did you need someone to help you on a regular basis? 0 Filed at: 07/05/2022 1734   In the last 24 hours, have you needed more help than usual? 0 Filed at: 07/05/2022 1734   Have you been hospitalized for one or more nights during the past 6 months?  1 Filed at: 07/05/2022 [phone]   In general, do you see well? 0 Filed at: 07/05/2022 1734   In general, do you have serious problems with your memory? 0 Filed at: 07/05/2022 1734   Do you take more than three different medications every day? 1 Filed at: 07/05/2022 1734   ISAR Score 2 Filed at: 07/05/2022 1734                              MDM  Number of Diagnoses or Management Options  Ambulatory dysfunction  Back pain  Urinary incontinence  Diagnosis management comments:     Patient 78year old male with lower back pain, incontinence and ambulatory dysfunction  Will obtain basic labs as well as urinalysis to assess for any kidney related disorders   CT scan to assess for possible kidney stones causing acute left flank pain and to visualize the kidney   Lab workup normal and near patient's baseline   Patient to be admitted to [facility] for further MRI imaging for possible cord compression and also for ambulatory dysfunction     Disposition  Final diagnoses:   Ambulatory dysfunction   Back pain   Urinary incontinence     Time reflects when diagnosis was documented in both MDM as applicable and the Disposition within this note     Time User Action Codes Description Comment    7/5/2022  9:00 PM Ardelia Jury Add [R26 2] Ambulatory dysfunction     7/5/2022  9:00 PM Ardelia Jurbella Add [M54 9] Back pain     7/5/2022  9:00 PM Ardelia Jury Add [R32] Urinary incontinence       ED Disposition     ED Disposition   Admit    Condition   Stable    Date/Time   Tue Jul 5, 2022  9:00 PM    Comment   Case was discussed with Dr Lj Flaherty and the patient's admission status was agreed to be Admission Status: observation status to the service of Dr Lj Flaherty              Follow-up Information     Follow up With Specialties Details Why Contact Info Additional Information    Anne Finley MD Family Medicine Schedule an appointment as soon as possible for a visit in 1 week(s)  [address]  [facility] [id number] Follow up Please see outpatient pain management [address] [id number]  Steve Hubbard, [address] [facility], [place], [place], [address] [phone]    Иван Jacobs [facility]  Follow up  Saad Bentley [facility] [address]  [phone]             Current Discharge Medication List      CONTINUE these medications which have NOT CHANGED    Details   aspirin (ECOTRIN LOW STRENGTH) 81 mg EC tablet Take 1 tablet (81 mg total) by mouth daily  Qty: 30 tablet, Refills: 2    Associated Diagnoses: Essential hypertension; Coronary artery disease involving native coronary artery of native heart without angina pectoris      BD Insulin Syringe U-500 31G X 6MM 0 5 ML MISC USE 1 SYRINGE THREE TIMES A DAY FOR INJECTING INSULIN  Qty: 100 each, Refills: 5    Associated Diagnoses: Type 2 diabetes mellitus with hyperglycemia, with long-term current use of insulin ([facility])      carvedilol (COREG) 6 25 mg tablet Take 1 tablet (6 25 mg total) by mouth 2 (two) times a day with meals  Qty: 180 tablet, Refills: 0    Associated Diagnoses: Essential hypertension; Coronary artery disease involving native coronary artery of native heart without angina pectoris      cyclobenzaprine (FLEXERIL) 10 mg tablet Take 1 tablet (10 mg total) by mouth in the morning and 1 tablet (10 mg total) before bedtime  Qty: 60 tablet, Refills: 5    Associated Diagnoses: Chronic bilateral low back pain, unspecified whether sciatica present      ferrous sulfate 325 (65 Fe) mg tablet Take 1 tablet (325 mg total) by mouth daily with breakfast  Qty: 30 tablet, Refills: 5    Associated Diagnoses: Stage 3a chronic kidney disease (HCC)      furosemide (LASIX) 40 mg tablet Take 1 tablet (40 mg total) by mouth daily  Qty: 90 tablet, Refills: 0    Comments: Take dose with any 3 pound weight gain     Associated Diagnoses: Acute on chronic diastolic (congestive) heart failure ([facility])      gabapentin (NEURONTIN) 100 mg capsule Take 1 capsule (100 mg total) by mouth 3 (three) times a day  Qty: 90 capsule, Refills: 0    Associated Diagnoses: Chronic bilateral low back pain without sciatica      glucose blood (OneTouch Verio) test strip May substitute brand based on insurance coverage  Check glucose QID  Qty: 100 each, Refills: 0    Associated Diagnoses: Type 2 diabetes mellitus with hyperglycemia, with long-term current use of insulin ([facility])      insulin regular (HUMULIN R) 500 units/mL CONCENTRATED injection Inject 0 09 mL (45 Units total) under the skin in the morning and 0 09 mL (45 Units total) at noon and 0 09 mL (45 Units total) in the evening  Inject before meals  Qty: 40 mL, Refills: 5    Associated Diagnoses: Chronic respiratory failure with hypoxia and hypercapnia ([facility] ); Type 2 diabetes mellitus with complication, with long-term current use of insulin ([facility])      ipratropium-albuterol (DUO-NEB) 0 5-2 5 mg/3 mL nebulizer solution Take 3 mL by nebulization 3 (three) times a day  Qty: 270 mL, Refills: 0    Associated Diagnoses: Chronic respiratory failure with hypoxia ([facility])      lidocaine (XYLOCAINE) 5 % ointment Apply topically as needed for mild pain  Qty: 35 44 g, Refills: 0    Associated Diagnoses: Chronic bilateral low back pain without sciatica      lisinopril (ZESTRIL) 10 mg tablet Take 1 tablet (10 mg total) by mouth daily  Qty: 90 tablet, Refills: 0    Associated Diagnoses: Essential hypertension      oxyCODONE 7 5 MG TABS Take 7 5 mg by mouth 2 (two) times a day as needed for severe pain Max Daily Amount: 15 mg  Qty: 10 tablet, Refills: 0    Associated Diagnoses: Acute cystitis without hematuria      potassium chloride (K-DUR,KLOR-CON) 20 mEq tablet Take 1 tablet (20 mEq total) by mouth in the morning    Qty: 17 tablet, Refills: 0    Comments: Take this pill when you take your lasix  Associated Diagnoses: Chronic respiratory failure with hypoxia and hypercapnia ([address] ) simvastatin (ZOCOR) 40 mg tablet Take 1 tablet (40 mg total) by mouth in the morning  Qty: 30 tablet, Refills: 5    Associated Diagnoses: Hypercholesterolemia      acetaminophen (TYLENOL) 500 mg tablet Take 2 tablets (1,000 mg total) by mouth every 8 (eight) hours  Qty: 180 tablet, Refills: 0    Associated Diagnoses: Acute cystitis without hematuria      fluticasone-umeclidinium-vilanterol (Trelegy Ellipta) 100-62 5-25 MCG/INH inhaler Inhale 1 puff  in the morning  Rinse mouth after use  Prasanna [place]: 1 each, Refills: 5    Associated Diagnoses: COPD exacerbation (HCC)      tamsulosin (FLOMAX) 0 4 mg Take 1 capsule (0 4 mg total) by mouth daily with dinner  Qty: 30 capsule, Refills: 0    Associated Diagnoses: Acute cystitis without hematuria           Outpatient Discharge Orders   EXTERNAL: Ambulatory Referral to Home Health   Standing Status: Future Standing Exp  Date: 07/06/23      Discharge Diet     Activity as tolerated     Call provider for:  severe uncontrolled pain       PDMP Review       Value Time User    PDMP Reviewed  Yes 5/5/2022 11:38 PM Ryan Garcia DO           ED Provider  Attending physically available and evaluated Mary Calixto Sr    I managed the patient along with the ED Attending      Electronically Signed by         Lan Cooley MD  07/06/22 1675

## 2022-07-06 ENCOUNTER — APPOINTMENT (OUTPATIENT)
Dept: RADIOLOGY | Facility: HOSPITAL | Age: 77
DRG: 552 | End: 2022-07-06
Payer: COMMERCIAL

## 2022-07-06 ENCOUNTER — HOME HEALTH ADMISSION (OUTPATIENT)
Dept: HOME HEALTH SERVICES | Facility: HOME HEALTHCARE | Age: 77
End: 2022-07-06

## 2022-07-06 LAB
ALBUMIN SERPL BCP-MCNC: 2.5 G/DL (ref 3.5–5)
ALP SERPL-CCNC: 72 U/L (ref 46–116)
ALT SERPL W P-5'-P-CCNC: 19 U/L (ref 12–78)
ANION GAP SERPL CALCULATED.3IONS-SCNC: 7 MMOL/L (ref 4–13)
AST SERPL W P-5'-P-CCNC: 11 U/L (ref 5–45)
BASOPHILS # BLD AUTO: 0.03 THOUSANDS/ΜL (ref 0–0.1)
BASOPHILS NFR BLD AUTO: 1 % (ref 0–1)
BILIRUB SERPL-MCNC: 0.31 MG/DL (ref 0.2–1)
BUN SERPL-MCNC: 39 MG/DL (ref 5–25)
CALCIUM ALBUM COR SERPL-MCNC: 9.3 MG/DL (ref 8.3–10.1)
CALCIUM SERPL-MCNC: 8.1 MG/DL (ref 8.3–10.1)
CHLORIDE SERPL-SCNC: 100 MMOL/L (ref 100–108)
CO2 SERPL-SCNC: 28 MMOL/L (ref 21–32)
CREAT SERPL-MCNC: 1.6 MG/DL (ref 0.6–1.3)
EOSINOPHIL # BLD AUTO: 0.17 THOUSAND/ΜL (ref 0–0.61)
EOSINOPHIL NFR BLD AUTO: 3 % (ref 0–6)
ERYTHROCYTE [DISTWIDTH] IN BLOOD BY AUTOMATED COUNT: 16.7 % (ref 11.6–15.1)
EST. AVERAGE GLUCOSE BLD GHB EST-MCNC: 249 MG/DL
GFR SERPL CREATININE-BSD FRML MDRD: 41 ML/MIN/1.73SQ M
GLUCOSE SERPL-MCNC: 109 MG/DL (ref 65–140)
GLUCOSE SERPL-MCNC: 276 MG/DL (ref 65–140)
GLUCOSE SERPL-MCNC: 394 MG/DL (ref 65–140)
GLUCOSE SERPL-MCNC: 88 MG/DL (ref 65–140)
HBA1C MFR BLD: 10.3 %
HCT VFR BLD AUTO: 32.2 % (ref 36.5–49.3)
HGB BLD-MCNC: 10.2 G/DL (ref 12–17)
IMM GRANULOCYTES # BLD AUTO: 0.04 THOUSAND/UL (ref 0–0.2)
IMM GRANULOCYTES NFR BLD AUTO: 1 % (ref 0–2)
LYMPHOCYTES # BLD AUTO: 1.2 THOUSANDS/ΜL (ref 0.6–4.47)
LYMPHOCYTES NFR BLD AUTO: 19 % (ref 14–44)
MAGNESIUM SERPL-MCNC: 2 MG/DL (ref 1.6–2.6)
MCH RBC QN AUTO: 25.6 PG (ref 26.8–34.3)
MCHC RBC AUTO-ENTMCNC: 31.7 G/DL (ref 31.4–37.4)
MCV RBC AUTO: 81 FL (ref 82–98)
MONOCYTES # BLD AUTO: 0.68 THOUSAND/ΜL (ref 0.17–1.22)
MONOCYTES NFR BLD AUTO: 11 % (ref 4–12)
NEUTROPHILS # BLD AUTO: 4.05 THOUSANDS/ΜL (ref 1.85–7.62)
NEUTS SEG NFR BLD AUTO: 65 % (ref 43–75)
NRBC BLD AUTO-RTO: 0 /100 WBCS
PHOSPHATE SERPL-MCNC: 3.4 MG/DL (ref 2.3–4.1)
PLATELET # BLD AUTO: 115 THOUSANDS/UL (ref 149–390)
PMV BLD AUTO: 10.7 FL (ref 8.9–12.7)
POTASSIUM SERPL-SCNC: 4.2 MMOL/L (ref 3.5–5.3)
PROT SERPL-MCNC: 6.5 G/DL (ref 6.4–8.2)
RBC # BLD AUTO: 3.99 MILLION/UL (ref 3.88–5.62)
SODIUM SERPL-SCNC: 135 MMOL/L (ref 136–145)
WBC # BLD AUTO: 6.17 THOUSAND/UL (ref 4.31–10.16)

## 2022-07-06 PROCEDURE — 99220 PR INITIAL OBSERVATION CARE/DAY 70 MINUTES: CPT | Performed by: INTERNAL MEDICINE

## 2022-07-06 PROCEDURE — 83735 ASSAY OF MAGNESIUM: CPT | Performed by: INTERNAL MEDICINE

## 2022-07-06 PROCEDURE — 72148 MRI LUMBAR SPINE W/O DYE: CPT

## 2022-07-06 PROCEDURE — 99235 HOSP IP/OBS SAME DATE MOD 70: CPT | Performed by: NURSE PRACTITIONER

## 2022-07-06 PROCEDURE — 97163 PT EVAL HIGH COMPLEX 45 MIN: CPT

## 2022-07-06 PROCEDURE — 97167 OT EVAL HIGH COMPLEX 60 MIN: CPT

## 2022-07-06 PROCEDURE — 84100 ASSAY OF PHOSPHORUS: CPT | Performed by: INTERNAL MEDICINE

## 2022-07-06 PROCEDURE — 85025 COMPLETE CBC W/AUTO DIFF WBC: CPT | Performed by: INTERNAL MEDICINE

## 2022-07-06 PROCEDURE — G1004 CDSM NDSC: HCPCS

## 2022-07-06 PROCEDURE — 83036 HEMOGLOBIN GLYCOSYLATED A1C: CPT | Performed by: INTERNAL MEDICINE

## 2022-07-06 PROCEDURE — 82948 REAGENT STRIP/BLOOD GLUCOSE: CPT

## 2022-07-06 PROCEDURE — 80053 COMPREHEN METABOLIC PANEL: CPT | Performed by: INTERNAL MEDICINE

## 2022-07-06 RX ORDER — INSULIN LISPRO 100 [IU]/ML
1-5 INJECTION, SOLUTION INTRAVENOUS; SUBCUTANEOUS
Status: DISCONTINUED | OUTPATIENT
Start: 2022-07-06 | End: 2022-07-07 | Stop reason: HOSPADM

## 2022-07-06 RX ORDER — INSULIN LISPRO 100 [IU]/ML
1-6 INJECTION, SOLUTION INTRAVENOUS; SUBCUTANEOUS
Status: DISCONTINUED | OUTPATIENT
Start: 2022-07-06 | End: 2022-07-07 | Stop reason: HOSPADM

## 2022-07-06 RX ORDER — LORAZEPAM 2 MG/ML
1 INJECTION INTRAMUSCULAR ONCE AS NEEDED
Status: COMPLETED | OUTPATIENT
Start: 2022-07-06 | End: 2022-07-06

## 2022-07-06 RX ADMIN — CYCLOBENZAPRINE HYDROCHLORIDE 10 MG: 10 TABLET, FILM COATED ORAL at 21:19

## 2022-07-06 RX ADMIN — LORAZEPAM 1 MG: 2 INJECTION INTRAMUSCULAR; INTRAVENOUS at 03:02

## 2022-07-06 RX ADMIN — INSULIN HUMAN 45 UNITS: 500 INJECTION, SOLUTION SUBCUTANEOUS at 11:15

## 2022-07-06 RX ADMIN — TAMSULOSIN HYDROCHLORIDE 0.4 MG: 0.4 CAPSULE ORAL at 17:59

## 2022-07-06 RX ADMIN — UMECLIDINIUM 1 PUFF: 62.5 AEROSOL, POWDER ORAL at 08:37

## 2022-07-06 RX ADMIN — CARVEDILOL 6.25 MG: 6.25 TABLET, FILM COATED ORAL at 18:00

## 2022-07-06 RX ADMIN — FERROUS SULFATE TAB 325 MG (65 MG ELEMENTAL FE) 325 MG: 325 (65 FE) TAB at 08:36

## 2022-07-06 RX ADMIN — ACETAMINOPHEN 975 MG: 325 TABLET, FILM COATED ORAL at 21:19

## 2022-07-06 RX ADMIN — ACETAMINOPHEN 975 MG: 325 TABLET, FILM COATED ORAL at 05:48

## 2022-07-06 RX ADMIN — FUROSEMIDE 40 MG: 40 TABLET ORAL at 08:36

## 2022-07-06 RX ADMIN — INSULIN LISPRO 4 UNITS: 100 INJECTION, SOLUTION INTRAVENOUS; SUBCUTANEOUS at 11:14

## 2022-07-06 RX ADMIN — POTASSIUM CHLORIDE 20 MEQ: 1500 TABLET, EXTENDED RELEASE ORAL at 08:36

## 2022-07-06 RX ADMIN — CARVEDILOL 6.25 MG: 6.25 TABLET, FILM COATED ORAL at 08:36

## 2022-07-06 RX ADMIN — CYCLOBENZAPRINE HYDROCHLORIDE 10 MG: 10 TABLET, FILM COATED ORAL at 08:35

## 2022-07-06 RX ADMIN — ENOXAPARIN SODIUM 40 MG: 40 INJECTION SUBCUTANEOUS at 08:36

## 2022-07-06 RX ADMIN — GABAPENTIN 100 MG: 100 CAPSULE ORAL at 18:00

## 2022-07-06 RX ADMIN — INSULIN HUMAN 45 UNITS: 500 INJECTION, SOLUTION SUBCUTANEOUS at 18:00

## 2022-07-06 RX ADMIN — ACETAMINOPHEN 975 MG: 325 TABLET, FILM COATED ORAL at 14:11

## 2022-07-06 RX ADMIN — GABAPENTIN 100 MG: 100 CAPSULE ORAL at 08:36

## 2022-07-06 RX ADMIN — PRAVASTATIN SODIUM 80 MG: 80 TABLET ORAL at 18:00

## 2022-07-06 RX ADMIN — GABAPENTIN 100 MG: 100 CAPSULE ORAL at 21:19

## 2022-07-06 RX ADMIN — INSULIN HUMAN 45 UNITS: 500 INJECTION, SOLUTION SUBCUTANEOUS at 08:44

## 2022-07-06 RX ADMIN — LISINOPRIL 10 MG: 10 TABLET ORAL at 08:36

## 2022-07-06 RX ADMIN — INSULIN LISPRO 6 UNITS: 100 INJECTION, SOLUTION INTRAVENOUS; SUBCUTANEOUS at 08:37

## 2022-07-06 RX ADMIN — ASPIRIN 81 MG: 81 TABLET, COATED ORAL at 08:36

## 2022-07-06 RX ADMIN — FLUTICASONE FUROATE AND VILANTEROL TRIFENATATE 1 PUFF: 100; 25 POWDER RESPIRATORY (INHALATION) at 08:37

## 2022-07-06 NOTE — PHYSICAL THERAPY NOTE
PHYSICAL THERAPY EVALUATION  NAME:  Cesario Thompson  DATE: 07/06/22    AGE:   78 y o  Mrn:   [id number]  ADMIT DX:  Back pain [M54 9]  Urinary incontinence [R32]  Ambulatory dysfunction [R26 2]    Past Medical History:   Diagnosis Date    Abdominal pain     MARANDA (acute kidney injury) ([facility] ) 6/19/2018    Cardiac disease     CHF (congestive heart failure) (HCC)     COPD, severe (HCC)     Coronary artery disease     DDD (degenerative disc disease), lumbar 9/1/2020    Diabetes mellitus (HCC)     Dyspnea     GERD (gastroesophageal reflux disease)     Hyperlipidemia     Hypertension     MI (myocardial infarction) ([facility] )     with 3 stents    Nodule of apex of right lung     JACQUELINE (obstructive sleep apnea)     Prostate cancer [facility])        Past Surgical History:   Procedure Laterality Date    ABDOMINAL SURGERY      exploratory    ANGIOPLASTY      3 stents    APPENDECTOMY      COLONOSCOPY  2015    CT NEEDLE BIOPSY LUNG  11/3/2020    ESOPHAGOGASTRODUODENOSCOPY N/A 10/2/2017    Procedure: ESOPHAGOGASTRODUODENOSCOPY (EGD); Surgeon: Altagracia Arevalo MD;  [facility]; Service: Gastroenterology    IR THORACENTESIS  11/3/2020    KNEE CARTILAGE SURGERY      OTHER SURGICAL HISTORY      stent placement    PROSTATE SURGERY      SKIN GRAFT      Basal cell CA back       Length Of Stay: 0    PHYSICAL THERAPY EVALUATION:        07/06/22 1115   Note Type   Note type Evaluation   Pain Assessment   Pain Assessment Tool 0-10   Pain Score 6   Pain Location/Orientation Location: Back   Pain Onset/Description Onset: Ongoing;Frequency: Constant/Continuous; Descriptor: Aching   Effect of Pain on Daily Activities Increased pain with activity   Patient's Stated Pain Goal No pain   Hospital Pain Intervention(s) Ambulation/increased activity;Repositioned   Restrictions/Precautions   Weight Bearing Precautions Per Order No   Other Precautions Cognitive; Chair Alarm; Bed Alarm;Multiple lines; Fall Risk;Pain   Home Living   Type of [address] Two level; Able to live on main level with bedroom/bathroom;Stairs to enter with rails  (1 VINI)   Home Equipment Walker;Cane   Additional Comments Patient reports living with son who is able to assist if needed  Pts son is home during the days and works night shift   Prior Function   Level of [place] Independent with ADLs and functional mobility   Lives With Son   [place] in the last 6 months 1 to 4   Comments Patient denies use of an assistive device for ambulation prior to admission   General   Family/Caregiver Present No   Cognition   Overall Cognitive Status Impaired   Arousal/Participation Alert   Attention Attends with cues to redirect   Orientation Level Oriented to person;Oriented to place;Oriented to situation;Disoriented to time   Memory Decreased recall of recent events   Following Commands Follows one step commands without difficulty   RUE Assessment   RUE Assessment WFL   LUE Assessment   LUE Assessment WFL   RLE Assessment   RLE Assessment WFL   Strength RLE   RLE Overall Strength 4/5   LLE Assessment   LLE Assessment WFL   Strength LLE   LLE Overall Strength 4/5   Bed Mobility   Supine to Sit 5  Supervision   Additional items Increased time required   Transfers   Sit to Stand 4  Minimal assistance   Additional items Assist x 1; Increased time required;Verbal cues   Stand to Sit 4  Minimal assistance   Additional items Assist x 1; Increased time required;Verbal cues   Additional Comments VC needed for hand placement during transfers   Ambulation/Elevation   Gait pattern Short stride; Foward flexed   Gait Assistance 4  Minimal assist   Additional items Assist x 1   Assistive Device Rolling walker   Distance 55ft   Stair Management Assistance   (pt denies concerns negotiating 1 VINI home)   Balance   Static Sitting Fair +   Static Standing Fair -   Ambulatory Poor +   Endurance Deficit   Endurance Deficit Yes   Endurance Deficit Description fatigue, pain   Activity Tolerance   Activity Tolerance Patient limited by fatigue;Patient limited by pain   Medical Staff Made Aware Chise, OT   Nurse Made Aware Patient appropriate to be seen and mobilized per nursing   Assessment   Prognosis Good   Problem List Decreased strength;Decreased endurance; Impaired balance;Decreased mobility;Pain;Decreased safety awareness;Decreased cognition   Assessment Pt is 78 y o  male seen for PT evaluation at [facility] on 7/5/2022  Two pt identifiers were used to confirm  Pt presented w/ exacerbation of chronic back pain  Pt with a primary dx of:  Chronic left-sided low back pain without sciatica, and other active problems including CKD, CAD, essential HTN, COPD, type 2 DM, HLD  PT now consulted for assessment of mobility and d/c needs  Pt with Up and OOB as tolerated  orders  Pts current co morbidities affecting treatment include:  CHF, COPD, CAD, degenerative disc disease, DM, dyspnea, HLD, HTN, mi, obstructive sleep apnea, prostate cancer  Pts current clinical presentation is Unstable/ Unpredictable (high complexity) due to Ongoing medical management for primary dx, Decreased activity tolerance compared to baseline, Fall risk, Increased assistance needed from caregiver at current time, Cog status, Continuous pulse oximetry monitoring     Upon evaluation, pt currently is requiring Supervision for bed mobility; Min Ax1 for transfers and Min Ax1 for ambulation w/ RW  Pt presents at PT eval functioning below baseline and currently w/ overall mobility deficits 2* to: BLE weakness, impaired balance, decreased endurance, gait deviations, pain, decreased activity tolerance compared to baseline, decreased safety awareness, fall risk  Pt currently at a fall risk 2* to impairments listed above    Based on the aforementioned PT evaluation, pt will continue to benefit from skilled Acute PT interventions to address stated impairments; to maximize functional mobility; for ongoing pt/ family training; and DME needs  At conclusion of PT session pt returned back in chair and chair alarm engaged with phone and call bell within reach  Pt denies any further questions at this time  PT is currently recommending Home PT and Home with increased family support  PT will continue to follow during hospital stay  Barriers to Discharge Comments Patient denies any mobility or safety concerns about returning home at time of discharge   Goals   Patient Goals " to go home"   [facility] Expiration Date 07/16/22   Short Term Goal #1 In 10 days pt will complete: 1) Bed mobility skills with mod I to increase safety and independence as well as decrease caregiver burden  2) Functional transfers with mod I to promote increased independence, safety, and QOL  3) Ambulate 150' using least restrictive AD with mod I without LOB and stable vitals so that pt can negotiate previous living environment safely and promote independence with functional mobility and return to PLOF  4) Stair training up/ down 1 step/s using rail/s with S so that pt can enter/negotiate previous living environment safely and decrease fall risk  5) Improve balance grades by 1/2 grade to increase safety with all mobility and decrease fall risk  6) Improve BLE strength by 1/2 grade to help increase overall functional mobility and decrease fall risk  Plan   Treatment/Interventions Functional transfer training;LE strengthening/ROM; Elevations; Therapeutic exercise; Endurance training;Patient/family training;Equipment eval/education; Bed mobility;Gait training;Spoke to nursing;OT   PT Frequency 3-5x/wk   Recommendation   PT Discharge Recommendation Home with home health rehabilitation   Equipment Recommended [place] walker   AM-PAC Basic Mobility Inpatient   Turning in Bed Without Bedrails 4   Lying on Back to Sitting on Edge of Flat Bed 4   Moving Bed to Chair 3   Standing Up From Chair 3   Walk in Room 3   Climb 3-5 Stairs 3   Basic Mobility Inpatient Raw Score 20   Basic Mobility Standardized Score 43 99   Highest Level Of Mobility   -HL Goal 6: Walk 10 steps or more   -HLM Achieved 7: Walk 25 feet or more   Modified [place] Scale   Modified [place] Scale 4   Barthel Index   Feeding 10   Bathing 5   Grooming Score 5   Dressing Score 5   Bladder Score 0   Bowels Score 10   Toilet Use Score 10   Transfers (Bed/Chair) Score 10   Mobility (Level Surface) Score 0   Stairs Score 0   Barthel Index Score 55   Portions of the documentation may have been created using voice recognition software  Occasional wrong word or sound alike substitutions may have occurred due to the inherent limitations of the voice recognition software  Read the chart carefully and recognize, using context, where substitutions have occurred      Delmy Larios, PT, DPT

## 2022-07-06 NOTE — ASSESSMENT & PLAN NOTE
Lab Results   Component Value Date    EGFR 37 07/05/2022    EGFR 54 06/18/2022    EGFR 48 06/17/2022    CREATININE 1 74 (H) 07/05/2022    CREATININE 1 29 06/18/2022    CREATININE 1 41 (H) 06/17/2022

## 2022-07-06 NOTE — OCCUPATIONAL THERAPY NOTE
Occupational Therapy Evaluation     Patient Name: Radha Miramontes  Today's Date: 7/6/2022  Problem List  Principal Problem:    Chronic left-sided low back pain without sciatica  Active Problems:    HLD (hyperlipidemia)    Type 2 diabetes mellitus with hyperglycemia, with long-term current use of insulin (HCC)    COPD, severe (HCC)    Essential hypertension    CAD (coronary artery disease)    CKD (chronic kidney disease) stage 3, GFR 30-59 ml/min ([facility])    Past Medical History  Past Medical History:   Diagnosis Date    Abdominal pain     MARANDA (acute kidney injury) ([facility] ) 6/19/2018    Cardiac disease     CHF (congestive heart failure) (HCC)     COPD, severe (HCC)     Coronary artery disease     DDD (degenerative disc disease), lumbar 9/1/2020    Diabetes mellitus ([facility])     Dyspnea     GERD (gastroesophageal reflux disease)     Hyperlipidemia     Hypertension     MI (myocardial infarction) ([facility] )     with 3 stents    Nodule of apex of right lung     JACQUELINE (obstructive sleep apnea)     Prostate cancer [facility])      Past Surgical History  Past Surgical History:   Procedure Laterality Date    ABDOMINAL SURGERY      exploratory    ANGIOPLASTY      3 stents    APPENDECTOMY      COLONOSCOPY  2015    CT NEEDLE BIOPSY LUNG  11/3/2020    ESOPHAGOGASTRODUODENOSCOPY N/A 10/2/2017    Procedure: ESOPHAGOGASTRODUODENOSCOPY (EGD); Surgeon: Kaitlynn Baer MD;  [facility]; Service: Gastroenterology    IR THORACENTESIS  11/3/2020    KNEE CARTILAGE SURGERY      OTHER SURGICAL HISTORY      stent placement    PROSTATE SURGERY      SKIN GRAFT      Basal cell CA back         07/06/22 1100   OT Last Visit   OT Visit Date 07/06/22   Note Type   Note type Evaluation   Restrictions/Precautions   Weight Bearing Precautions Per Order No   Other Precautions Cognitive; Chair Alarm;Multiple lines; Fall Risk;Pain   Pain Assessment   Pain Assessment Tool 0-10   Pain Score 6   Pain Location/Orientation Location: Back   Patient's Stated Pain Goal No pain   Hospital Pain Intervention(s) Repositioned; Ambulation/increased activity; Emotional support   Home Living   Type of [address] Two level; Able to live on main level with bedroom/bathroom;Stairs to enter with rails  (1 VINI)   Bathroom Shower/Tub Walk-in shower   Bathroom Toilet Raised   Bathroom Equipment Shower chair   [address] Walker;Cane   Additional Comments NO USE OF AD AT BASELINE  + USE OF SC   Prior Function   Level of [place] Independent with ADLs and functional mobility   Lives With Son   Receives Help From Family   ADL Assistance Independent   IADLs Independent   Falls in the last 6 months 1 to 4  (AT LEAST 1)   Vocational Retired   Lifestyle   Autonomy  [facility] ADLS/LT IADLS/DRIVING PTA   Reciprocal Relationships [address]  PT REPORTS SON IS ABLE TO ASSIST DURING THE DAY   Service to Others RETIRED;    Intrinsic Gratification ENJOYS RESTING AND WATCHING TV   Psychosocial   Psychosocial (WDL) WDL   Subjective   Subjective "I WEAR A BRACE WHEN I'M HAVING PAIN  I DON'T NEED IT NOW"   ADL   Eating Assistance 5  Supervision/Setup   Grooming Assistance 5  Supervision/Setup   UB Bathing Assistance 4  Minimal Assistance   LB Bathing Assistance 3  Moderate Assistance   UB Dressing Assistance 4  Minimal Assistance   LB Dressing Assistance 3  Moderate Assistance   Toileting Assistance  3  Moderate Assistance   Functional Assistance 4  Minimal Assistance   Bed Mobility   Supine to Sit 5  Supervision   Additional items Increased time required   Sit to Supine Unable to assess  (PT LEFT OOB WITH ALARM ON AND ALL NEEDS IN REACH)   Transfers   Sit to Stand 4  Minimal assistance   Additional items Assist x 1; Increased time required;Verbal cues   Stand to Sit 4  Minimal assistance   Additional items Assist x 1; Increased time required;Verbal cues   Functional Mobility   Functional Mobility 4  Minimal assistance   Additional items Rolling walker   Balance   Static Sitting Fair +   Static Standing Fair -   Ambulatory Poor +   Activity Tolerance   Activity Tolerance Patient limited by fatigue   Medical Staff Made Aware SPOKE TO PT FOR D/C PLANNING   Nurse Made Aware APPROPRIATE TO SEE   RUE Assessment   RUE Assessment WFL   LUE Assessment   LUE Assessment WFL   Hand Function   Gross Motor Coordination Functional   Fine Motor Coordination Functional   Cognition   Overall Cognitive Status Impaired   Arousal/Participation Alert   Attention Attends with cues to redirect   Orientation Level Oriented to person;Oriented to place;Oriented to situation;Disoriented to time  (TIME INCLUDING YEAR ONLY)   Memory Decreased recall of recent events   Following Commands Follows multistep commands with increased time or repetition   Comments PT IS MILDLY SLOW TO PROCESS/RESPOND  LIMITED INSIGHT/JUDGEMENT/SAFETY AWARENESS  NOTED TO BE INCONTINENT WITHOUT ACKNOWLEGMENT  ALARM ON FOR SAFETY   Assessment   Limitation Decreased ADL status; Decreased Safe judgement during ADL;Decreased cognition;Decreased endurance;Decreased self-care trans;Decreased high-level ADLs   Prognosis Good   Assessment 79 YO Male SEEN FOR INITIAL OCCUPATIONAL THERAPY EVALUATION FOLLOWING ADMISSION TO [facility] WITH ACUTE ON CHRONIC BACK PAIN  MRI WITHOUT ACUTE FINDINGS  PROBLEMS LIST INCLUDES Abdominal pain, MARANDA (acute kidney injury) ([address] ), Cardiac disease, CHF (congestive heart failure) ([address] ), COPD, severe ([address] ), Coronary artery disease, DDD (degenerative disc disease), lumbar, Diabetes mellitus ([address] ), Dyspnea, GERD (gastroesophageal reflux disease), Hyperlipidemia, Hypertension, MI (myocardial infarction) ([address] ), Nodule of apex of right lung, JACQUELINE (obstructive sleep apnea), and Prostate cancer ([address] )  PT IS FROM HOME WITH SON WHO WORKS NIGHT SHIFT  PT REPORTS BEING INDEPENDENT WITH ADLS/LT IADLS/DRIVING PTA   PT CURRENTLY REQUIRES OVERALL MIN-MOD A WITH ADLS, AND MIN A WITH TRANSFERS /FUNCTIONAL MOBILITY WITH USE OF RW  PT IS LIMITED 2' PAIN, FATIGUE, IMPAIRED BALANCE, FALL RISK , LIMITED SAFETY AWARENESS/INSIGHT/JUDGEMENT, OVERALL WEAKNESS/DECONDITIONING , SOB and OVERALL LIMITED ACTIVITY TOLERANCE  PT EDUCATED ON DEEP BREATHING TECHNIQUES T/O ACTIVITY, SLOWING OF PACE, ENERGY CONSERVATION TECHNIQUES FOR CARRY OVER UPON D/C, INCREASED FAMILY SUPPORT and CONTINUE PARTICIPATION IN SELF-CARE/MOBILITY WITH STAFF Mary W Geovanny Mathur   The patient's raw score on the AM-PAC Daily Activity inpatient short form is 16, standardized score is 35 96, less than 39 4  Patients at this level are likely to benefit from discharge to post-acute rehabilitation services  Please refer to the recommendation of the Occupational Therapist for safe discharge planning  FROM AN OCCUPATIONAL THERAPY PERSPECTIVE, PT CAN RETURN HOME WITH INCREASED FAMILY SUPPORT + HOME OT SERVICES UPON D/C  WILL CONT TO FOLLOW TO ADDRESS THE BELOW DESCRIBED GOALS  Goals   Patient Goals TO HAVE A SEATED REST BREAK   LTG Time Frame 10-14   Long Term Goal #1 SEE BELOW   Plan   Treatment Interventions ADL retraining;Functional transfer training; Endurance training;Cognitive reorientation;Patient/family training;Equipment evaluation/education; Compensatory technique education; Energy conservation; Activityengagement   Goal Expiration Date 07/20/22   OT Frequency 3-5x/wk   Recommendation   Recommendation Geriatric Consult   OT Discharge Recommendation Home with home health rehabilitation  (PENDING PT PROGRESS)   AM-PAC Daily Activity Inpatient   Lower Body Dressing 2   Bathing 2   Toileting 2   Upper Body Dressing 3   Grooming 3   Eating 4   Daily Activity Raw Score 16   Daily Activity Standardized Score (Calc for Raw Score >=11) 35 96   AM-PAC Applied Cognition Inpatient   Following a Speech/Presentation 3   Understanding Ordinary Conversation 4   Taking Medications 3   Remembering Where Things Are Placed or Put Away 3   Remembering List of 4-5 Errands 3   Taking Care of Complicated Tasks 3   Applied Cognition Raw Score 19   Applied Cognition Standardized Score 39 77   Modified [place] Scale   Modified [place] Scale 4       OCCUPATIONAL THERAPY GOALS TO BE MET WITHIN 10-14 DAYS:    -Pt will complete additional cognitive assessment with 100% attention to task in order to assist with safe d/c plan  -Pt will follow 100% simple 2-step commands and be A&O x4 consistently with environmental cues to increase participation in functional activities  -Pt will increase bed mobility to MOD I to participate in functional activities with G tolerance and balance  -Pt will improve functional mobility and transfers to MOD I on/off all surfaces w/ G balance/safety including toileting   -Pt will participate in lt grooming task with MOD I after set-up standing at sink ~3-5 minutes with G safety and balance  -Pt will increase independence in all ADLS to MOD I with G balance sitting upright in chair   -Pt will improve activity tolerance to G for 30 min txment sessions w/ G carry over of learned energy conservation techniques   -Pt will improve independence in lt homemaking activities to MOD I without requiring cues for safety   -Pt will demonstrate G carryover of learned safety techniques and proper body mechanics in functional and leisure activities with use of DME      Documentation completed by Twin Rodas, [facility], OTR/L  [address] [place] [address], Ne Certified ID# [id number]

## 2022-07-06 NOTE — H&P
[address] [facility]  H&P- John Conley   1945, 78 y o  male MRN: [id number]  Unit/Bed#: [facility] 604-01 Encounter: [id number]  Primary Care Provider: Aditi Benz MD   Date and time admitted to hospital: 7/5/2022  6:00 PM    * Chronic left-sided low back pain without sciatica  Assessment & Plan  There was a concern of whether the patient may have radiculopathy however currently claiming numbness and tingling of bilateral lower extremities as well as bilateral hands which may be related to neuropathy  Straight leg testing is negative  MRI of lumbosacral back  Continue xylocaine patch  Recently placed on oxycodone 7 5 mg twice daily p r n ; will place adult pain protocol for now for the back pain  (oxycodone 5, 10, Dilaudid 0 5)  Physical and occupational therapy with case management consult  Continue Neurontin 100 mg 3 times daily  CKD (chronic kidney disease) stage 3, GFR 30-59 ml/min [facility])  Assessment & Plan  Lab Results   Component Value Date    EGFR 37 07/05/2022    EGFR 54 06/18/2022    EGFR 48 06/17/2022    CREATININE 1 74 (H) 07/05/2022    CREATININE 1 29 06/18/2022    CREATININE 1 41 (H) 06/17/2022       CAD (coronary artery disease)  Assessment & Plan  Continue Coreg 6 25 twice daily  Essential hypertension  Assessment & Plan  Continue Zestril 10 mg daily  Lasix 40 mg daily  Coreg 6 25 twice daily  COPD, severe ([address] )  Assessment & Plan  On Incruse; continue Brio  Type 2 diabetes mellitus with hyperglycemia, with long-term current use of insulin ([facility])  Assessment & Plan  Lab Results   Component Value Date    HGBA1C 9 2 (H) 04/22/2022       Continue regular insulin 45 units U 500; 3 times daily  Insulin sliding scale with Accu-Cheks per protocol  Blood Sugar Average: Last 72 hrs:      HLD (hyperlipidemia)  Assessment & Plan  On Pravachol/Zocor            VTE Prophylaxis: Enoxaparin (Lovenox)  / sequential compression device   Code Status: Level 1 - Full Code as discussed with patient  POLST: There is no POLST form on file for this patient (pre-hospital)    Anticipated Length of Stay:  Patient will be admitted on an Observation basis with an anticipated length of stay of  less than 2 midnights  Justification for Hospital Stay: Please see detailed plans noted above  Chief Complaint:     Exacerbation of left back pain  History of Present Illness:  Reginaldo Diamond Sr  is a 78 y o  male who has past medical history significant for chronic bilateral back pain without sciatica; likewise has a recent history of epididymitis treated with ciprofloxacin and was put on oxycodone 7 5 mg twice daily p r n  , CAD, COPD, hypertension, hyperlipidemia, diabetes mellitus type 2 with use of insulin with chronic neuropathy, 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  Patient comes in due to exacerbation of back pain  According to the patient, he might have woke up with it and denies having strained or carrying any heavy equipment  Patient also denies any recent trauma or sudden unexpected movement  He claims that the pain is located at the left lumbar area and it is excruciating that he cannot walk  Currently, patient is sitting at the side of the bed watching TV  Patient's claiming left lower back pain  However, the patient is able to move spontaneously and lie flat on bed and ordered to      Review of Systems:    Constitutional:  Denies fever or chills   Eyes:  Denies change in visual acuity   HENT:  Denies nasal congestion or sore throat   Respiratory:  Denies cough or shortness of breath   Cardiovascular:  Denies chest pain or edema   GI:  Denies abdominal pain, nausea, vomiting, bloody stools or diarrhea   :  Denies dysuria   Musculoskeletal:  Claims left-sided back pain at the lumbar area  Integument:  Denies rash   Neurologic:  Denies headache, focal weakness or sensory changes   Endocrine:  Denies polyuria or polydipsia   Lymphatic:  Denies swollen glands   Psychiatric:  Denies depression or anxiety     Past Medical and Surgical History:   Past Medical History:   Diagnosis Date    Abdominal pain     [facility] 6/19/2018    Cardiac disease     CHF (congestive heart failure) (HCC)     COPD, severe (HCC)     Coronary artery disease     DDD (degenerative disc disease), lumbar 9/1/2020    Diabetes mellitus ([facility] )     Dyspnea     GERD (gastroesophageal reflux disease)     Hyperlipidemia     Hypertension     MI (myocardial infarction) ([facility] )     with 3 stents    Nodule of apex of right lung     JACQUELINE (obstructive sleep apnea)     Prostate cancer [facility])      Past Surgical History:   Procedure Laterality Date    ABDOMINAL SURGERY      exploratory    ANGIOPLASTY      3 stents    APPENDECTOMY      COLONOSCOPY  2015    CT NEEDLE BIOPSY LUNG  11/3/2020    ESOPHAGOGASTRODUODENOSCOPY N/A 10/2/2017    Procedure: ESOPHAGOGASTRODUODENOSCOPY (EGD); Surgeon: Monique Cunningham MD;  Location:  GI LAB;   Service: Gastroenterology    IR THORACENTESIS  11/3/2020    KNEE CARTILAGE SURGERY      OTHER SURGICAL HISTORY      stent placement    PROSTATE SURGERY      SKIN GRAFT      Basal cell CA back       Meds/Allergies:  Medications Prior to Admission   Medication    aspirin (ECOTRIN LOW STRENGTH) 81 mg EC tablet    BD Insulin Syringe U-500 31G X 6MM 0 5 ML MISC    carvedilol (COREG) 6 25 mg tablet    cyclobenzaprine (FLEXERIL) 10 mg tablet    ferrous sulfate 325 (65 Fe) mg tablet    furosemide (LASIX) 40 mg tablet    gabapentin (NEURONTIN) 100 mg capsule    glucose blood (OneTouch Verio) test strip    insulin regular (HUMULIN R) 500 units/mL CONCENTRATED injection    ipratropium-albuterol (DUO-NEB) 0 5-2 5 mg/3 mL nebulizer solution    lidocaine (XYLOCAINE) 5 % ointment    lisinopril (ZESTRIL) 10 mg tablet    oxyCODONE 7 5 MG TABS    potassium chloride (K-DUR,KLOR-CON) 20 mEq tablet    simvastatin (ZOCOR) 40 mg tablet    acetaminophen (TYLENOL) 500 mg tablet    fluticasone-umeclidinium-vilanterol (Trelegy Ellipta) 100-62 5-25 MCG/INH inhaler    tamsulosin (FLOMAX) 0 4 mg       Allergies: Allergies   Allergen Reactions    Crestor [Rosuvastatin] Other (See Comments)     Unknown      Metformin GI Intolerance     History:  Marital Status:    Occupation:  Currently retired  Patient Pre-hospital Living Situation:  Home  Patient Pre-hospital Level of Mobility:  Ambulatory  Patient Pre-hospital Diet Restrictions:  Regular  Substance Use History:   Social History     Substance and Sexual Activity   Alcohol Use Never     Social History     Tobacco Use   Smoking Status Former Smoker    Packs/day: 1 50    Years: 50 00    Pack years: 75 00    Start date: 38    Quit date: 2020    Years since quittin 9   Smokeless Tobacco Never Used     Social History     Substance and Sexual Activity   Drug Use No       Family History:  Family History   Problem Relation Age of Onset    Heart disease Father     Other Father         Mesothelioma        Physical Exam:     Vitals:   Blood Pressure: 130/62 (22)  Pulse: 101 (22)  Temperature: 98 3 °F (36 8 °C) (22)  Temp Source: Temporal (22 1733)  Respirations: 18 (22)  Height: 6' (182 9 cm) (22)  Weight - Scale: 99 8 kg (220 lb) (22)  SpO2: 92 % (22)    Constitutional:  Well developed, well nourished, no acute distress, non-toxic appearance   Eyes:  PERRL, conjunctiva normal   HENT:  Atraumatic, external ears normal, nose normal, oropharynx moist, no pharyngeal exudates   Neck- normal range of motion, no tenderness, supple   Respiratory:  No respiratory distress, normal breath sounds, no rales, no wheezing   Cardiovascular:  Normal rate, normal rhythm, no murmurs, no gallops, no rubs   GI:  Soft, nondistended, normal bowel sounds, nontender, no organomegaly, no mass, no rebound, no guarding   :  No costovertebral angle tenderness   Musculoskeletal:  No edema, no tenderness, no deformities  Back-tenderness located at the paraspinal lumbar area approximately at lumbar 2 to 3; straight leg testing negative  Integument:  Well hydrated, no rash   Lymphatic:  No lymphadenopathy noted   Neurologic:  Alert &awake, communicative, CN 2-12 normal, normal motor function, normal sensory function, no focal deficits noted   Psychiatric:  Speech and behavior appropriate       Lab Results: I have personally reviewed pertinent reports  Results from last 7 days   Lab Units 07/05/22  1944   WBC Thousand/uL 9 01   HEMOGLOBIN g/dL 11 8*   HEMATOCRIT % 38 0   PLATELETS Thousands/uL 152   NEUTROS PCT % 72   LYMPHS PCT % 16   MONOS PCT % 8   EOS PCT % 3     Results from last 7 days   Lab Units 07/05/22 1946   POTASSIUM mmol/L 4 6   CHLORIDE mmol/L 102   CO2 mmol/L 29   BUN mg/dL 35*   CREATININE mg/dL 1 74*   CALCIUM mg/dL 9 1   ALK PHOS U/L 85   ALT U/L 24   AST U/L 17         Imaging: I have personally reviewed pertinent reports  CT abdomen pelvis wo contrast    Result Date: 6/15/2022  Narrative: CT ABDOMEN AND PELVIS WITHOUT IV CONTRAST INDICATION:   suprapubic swelling, testicular swelling  COMPARISON:  Most recent prior exam is a CT scan dated March 29, 2022  TECHNIQUE:  CT examination of the abdomen and pelvis was performed without intravenous contrast  This examination was performed without intravenous contrast in the context of the critical nationwide Omnipaque shortage  Axial, sagittal, and coronal 2D reformatted images were created from the source data and submitted for interpretation  Radiation dose length product (DLP) for this visit:  1057 44 mGy-cm   This examination, like all CT scans performed in the [facility], was performed utilizing techniques to minimize radiation dose exposure, including the use of iterative reconstruction and automated exposure control   Enteric contrast was not administered  FINDINGS: ABDOMEN LOWER CHEST:  Volume loss with shift of the mediastinum to the right, small right pleural effusion and rounded atelectasis in the right base  LIVER/BILIARY TREE:  Unremarkable  GALLBLADDER:  No calcified gallstones  No pericholecystic inflammatory change  SPLEEN:  Unremarkable  PANCREAS:  Unremarkable  ADRENAL GLANDS:  Unremarkable  KIDNEYS/URETERS:  Nonspecific bilateral perinephric edema  STOMACH AND BOWEL:  Scattered colonic diverticula but no evidence of acute diverticulitis  Large amount of formed stool throughout the colon  No evidence of bowel obstruction  APPENDIX:  No findings to suggest appendicitis  ABDOMINOPELVIC CAVITY:  No ascites  No pneumoperitoneum  No lymphadenopathy  VESSELS:  Atherosclerotic changes are present  No evidence of aneurysm  PELVIS REPRODUCTIVE ORGANS:  Multiple brachytherapy seeds are in the prostate gland  URINARY BLADDER:  Unremarkable  ABDOMINAL WALL/INGUINAL REGIONS:  Infiltration of the subcutaneous fat over the region of the mons pubis and skin thickening  There is no abscess or gas within the soft tissues  Small fat-containing umbilical hernia  Subcutaneous nodule in the left abdominal wall just below the umbilicus with surrounding inflammatory fat stranding and skin thickening  No abscess  Left inguinal canal lipoma  OSSEOUS STRUCTURES:  No acute fracture or destructive osseous lesion  Spinal degenerative changes are noted  Impression: 1  Findings most likely representing cellulitis in the area of the mons pubis  No abscess or gas within the soft tissues  2   small right pleural effusion and right lower lobe rounded atelectasis  The study was marked in [facility] for immediate notification  Workstation performed: [id number]     XR orbits for foreign body    Result Date: 7/6/2022  Narrative: ORBITS INDICATION:   hx of eye injury grinding metal, need clearance for MRI brain   COMPARISON:  None VIEWS:  XR ORBITS FOR FOREIGN BODY FINDINGS: There is no evidence of radiopaque orbital foreign body  Punctate metallic density noted within the soft tissues along the anterior left lower jaw  The paranasal sinuses are clear  Impression: No radiopaque orbital foreign body  Punctate metallic density noted within the soft tissues along the anterior left lower jaw of uncertain etiology/clinical significance  Correlate clinically  Workstation performed: [id number]     XR chest 1 view portable    Result Date: 6/10/2022  Narrative: CHEST INDICATION:   shortness of breath  COMPARISON:  5/21/2022 EXAM PERFORMED/VIEWS:  XR CHEST PORTABLE FINDINGS: Cardiomegaly noted as before  Scarring right apex as before  Right basilar opacity redemonstrated  Osseous structures appear within normal limits for patient age  Impression: Stable post lobectomy changes with right apex pulmonary scarring and persistent right basilar opacity likely reflecting a combination of pleural fluid and compressive atelectasis  No acute cardiopulmonary disease  Workstation performed: [place] scrotum and testicles    Result Date: 6/15/2022  Narrative: SCROTAL ULTRASOUND INDICATION:    testicular pain  COMPARISON: 1/21/2022; 6/15/2022 TECHNIQUE:   Ultrasound the scrotal contents was performed with a high frequency linear transducer utilizing volumetric sweep imaging as well as standard still image techniques  Imaging performed in longitudinal and transverse orientation  Color and spectral Doppler evaluation also performed bilaterally  FINDINGS: TESTES: Testes are symmetric and normal in size  RIGHT testis = 2 6 x 1 6 x 2 1 cm  Volume 4 5 mL Normal contour with homogeneous smooth echotexture  No intratesticular mass lesion or calcifications  LEFT testis = 2 6 x 2 3 x 1 9 cm  Volume 6 0 mL There is marked hyperemia of the left testis compared to the right  Echotexture is normal  No intratesticular mass lesion or calcifications   Doppler flow within both testes is present and symmetric  EPIDIDYMIDES: Marked enlargement of both epididymides Bilateral epididymal hyperemia  No epididymal lesions  HYDROCELE:  Complex moderate-sized left-sided hydrocele with internal septations and echogenic elements  VARICOCELE:  None present  SCROTUM:  Scrotal thickness and appearance within normal limits  No evidence for extratesticular mass or hernia demonstrated  Impression:  1  Bilateral epididymitis and left-sided orchitis  2   Complex left hydrocele possibly reactive related to adjacent inflammation/infection  Recommend follow-up imaging to assess for resolution as blood products could also be present  Workstation performed: [id number]     CT renal stone study abdomen pelvis wo contrast    Result Date: 7/5/2022  Narrative: CT ABDOMEN AND PELVIS WITHOUT IV CONTRAST - LOW DOSE RENAL STONE INDICATION:   Flank pain, kidney stone suspected rule out kidney stone  COMPARISON:  6/15/2022  TECHNIQUE:  Low radiation dose thin section CT examination of the abdomen and pelvis was performed without intravenous or oral contrast according to a protocol specifically designed to evaluate for urinary tract calculus  Axial, sagittal, and coronal 2D  reformatted images were created from the source data and submitted for interpretation  Evaluation for pathology in the abdomen and pelvis that is unrelated to urinary tract calculi is limited  Radiation dose length product (DLP) for this visit:  790 mGy-cm   This examination, like all CT scans performed in the [facility], was performed utilizing techniques to minimize radiation dose exposure, including the use of iterative reconstruction and automated exposure control  URINARY TRACT FINDINGS: RIGHT KIDNEY AND URETER:  No urinary tract calculi  No hydronephrosis or hydroureter  Stable nonspecific perinephric stranding  LEFT KIDNEY AND URETER:  No urinary tract calculi  No hydronephrosis or hydroureter  Stable nonspecific perinephric stranding  URINARY BLADDER:  Unremarkable  ADDITIONAL FINDINGS: LOWER CHEST:  Redemonstrated volume loss within the right hemithorax and rightward mediastinal shift, small right pleural effusion and rounded atelectasis at the right base similar in appearance to prior  SOLID VISCERA: Limited low radiation dose noncontrast CT evaluation demonstrates no clinically significant abnormality of the imaged portions of the liver, spleen, pancreas, or adrenal glands  GALLBLADDER/BILIARY TREE:  Probable small amount layering stones versus sludge noted  No pericholecystic inflammatory change  No biliary dilatation  STOMACH AND BOWEL:  There is colonic diverticulosis without evidence of acute diverticulitis  APPENDIX:  No findings to suggest appendicitis  ABDOMINOPELVIC CAVITY:  No ascites  No pneumoperitoneum  Stable appearance shotty periaortic/retroperitoneal lymph nodes  No pathologic lymphadenopathy  REPRODUCTIVE ORGANS:  Prostate brachytherapy seeds again noted  ABDOMINAL WALL/INGUINAL REGIONS:  Fat containing left inguinal hernia noted  Previously noted infiltrative changes within the pubic subcutaneous fat have improved  OSSEOUS STRUCTURES:  No acute fracture or destructive osseous lesion  Impression: No evidence of obstructive uropathy or nephroureterolithiasis  Redemonstrated small right pleural effusion and probable rounded atelectasis at the right base similar in appearance to prior  Workstation performed: [id number]     [address] [facility] bedside procedure    Result Date: 6/9/2022  Narrative: 1 2 [id number]  2 446 [id number] 11 1        ** Please Note: Dragon 360 Dictation voice to text software was used in the creation of this document   **

## 2022-07-06 NOTE — DISCHARGE INSTR - OTHER ORDERS
1  Apply hydraguard to b/l heels, buttocks and sacrum BID and PRN for prevention and protection  2  Apply skin nourishing cream the entire skin daily for moisture  3  Turn and reposition patient every  2 hours   4  Elevate heels off of bed with pillows to offload pressure   5  Apply EHOB waffle cushion to chair when OOB, if able  6  Cleanse L posterior forearm wounds with NSS, pat dry, and apply dermagran gauze to the wound beds (cut to fit size of the wounds)  Cover with 4x4 gauze  Secure the dressings with savage wrap  Change every other day and as needed for soilage/dislodgement

## 2022-07-06 NOTE — ASSESSMENT & PLAN NOTE
Lab Results   Component Value Date    HGBA1C 9 2 (H) 04/22/2022       Continue regular insulin 45 units U 500; 3 times daily  Insulin sliding scale with Accu-Cheks per protocol      Blood Sugar Average: Last 72 hrs:

## 2022-07-06 NOTE — PLAN OF CARE
Problem: PAIN - ADULT  Goal: Verbalizes/displays adequate comfort level or baseline comfort level  Description: Interventions:  - Encourage patient to monitor pain and request assistance  - Assess pain using appropriate pain scale  - Administer analgesics based on type and severity of pain and evaluate response  - Implement non-pharmacological measures as appropriate and evaluate response  - Consider cultural and social influences on pain and pain management  - Notify physician/advanced practitioner if interventions unsuccessful or patient reports new pain  Outcome: Progressing     Problem: INFECTION - ADULT  Goal: Absence or prevention of progression during hospitalization  Description: INTERVENTIONS:  - Assess and monitor for signs and symptoms of infection  - Monitor lab/diagnostic results  - Monitor all insertion sites, i e  indwelling lines, tubes, and drains  - Monitor endotracheal if appropriate and nasal secretions for changes in amount and color  - [place] appropriate cooling/warming therapies per order  - Administer medications as ordered  - Instruct and encourage patient and family to use good hand hygiene technique  - Identify and instruct in appropriate isolation precautions for identified infection/condition  Outcome: Progressing  Goal: Absence of fever/infection during neutropenic period  Description: INTERVENTIONS:  - Monitor WBC    Outcome: Progressing     Problem: SAFETY ADULT  Goal: Patient will remain free of falls  Description: INTERVENTIONS:  - Educate patient/family on patient safety including physical limitations  - Instruct patient to call for assistance with activity   - Consult OT/PT to assist with strengthening/mobility   - Keep Call bell within reach  - Keep bed low and locked with side rails adjusted as appropriate  - Keep care items and personal belongings within reach  - Initiate and maintain comfort rounds  - Make Fall Risk Sign visible to staff  - Offer Toileting every 2 Hours, in advance of need  - Initiate/Maintain alarm  - Obtain necessary fall risk management equipment  - Apply yellow socks and bracelet for high fall risk patients  - Consider moving patient to room near nurses station  Outcome: Progressing  Goal: Maintain or return to baseline ADL function  Description: INTERVENTIONS:  -  Assess patient's ability to carry out ADLs; assess patient's baseline for ADL function and identify physical deficits which impact ability to perform ADLs (bathing, care of mouth/teeth, toileting, grooming, dressing, etc )  - Assess/evaluate cause of self-care deficits   - Assess range of motion  - Assess patient's mobility; develop plan if impaired  - Assess patient's need for assistive devices and provide as appropriate  - Encourage maximum independence but intervene and supervise when necessary  - Involve family in performance of ADLs  - Assess for home care needs following discharge   - Consider OT consult to assist with ADL evaluation and planning for discharge  - Provide patient education as appropriate  Outcome: Progressing  Goal: Maintains/Returns to pre admission functional level  Description: INTERVENTIONS:  - Perform BMAT or MOVE assessment daily    - Set and communicate daily mobility goal to care team and patient/family/caregiver  - Collaborate with rehabilitation services on mobility goals if consulted  - Perform Range of Motion 3 times a day  - Reposition patient every 3 hours    - Dangle patient 3 times a day  - Stand patient 3 times a day  - Ambulate patient 3 times a day  - Out of bed to chair 3 times a day   - Out of bed for meals 3 times a day  - Out of bed for toileting  - Record patient progress and toleration of activity level   Outcome: Progressing     Problem: DISCHARGE PLANNING  Goal: Discharge to home or other facility with appropriate resources  Description: INTERVENTIONS:  - Identify barriers to discharge w/patient and caregiver  - Arrange for needed discharge resources and transportation as appropriate  - Identify discharge learning needs (meds, wound care, etc )  - Arrange for interpretive services to assist at discharge as needed  - Refer to Case Management Department for coordinating discharge planning if the patient needs post-hospital services based on physician/advanced practitioner order or complex needs related to functional status, cognitive ability, or social support system  Outcome: Progressing     Problem: Knowledge Deficit  Goal: Patient/family/caregiver demonstrates understanding of disease process, treatment plan, medications, and discharge instructions  Description: Complete learning assessment and assess knowledge base    Interventions:  - Provide teaching at level of understanding  - Provide teaching via preferred learning methods  Outcome: Progressing

## 2022-07-06 NOTE — CASE MANAGEMENT
Case Management Assessment & Discharge Planning Note    Patient name Lauryn Fuller   [facility] MRN [id number]  : 1945 Date 2022       Current Admission Date: 2022  Current Admission Diagnosis:Chronic left-sided low back pain without sciatica   Patient Active Problem List    Diagnosis Date Noted    Epididymitis, bilateral 06/15/2022    Acute cystitis without hematuria 06/15/2022    Acute respiratory failure with hypoxia ([facility] ) 2022    Chronic left-sided low back pain without sciatica 2022    CKD (chronic kidney disease) stage 3, GFR 30-59 ml/min ([facility] ) 2022    History of prostate cancer 2022    Adenocarcinoma of lung ([facility] ) 2022    Fracture of navicular bone of left foot 2021    Chronic respiratory failure with hypoxia ([facility] ) 04/15/2021    PAD (peripheral artery disease) ([facility] ) 2021    DDD (degenerative disc disease), lumbar 2020    Anemia, iron deficiency 2020    Lung nodule 2020    Pulmonary hypertension ([facility] ) 2019    JACQUELINE (obstructive sleep apnea) 2019    COPD with acute exacerbation ([facility] ) 2019    Oxygen dependent 2018    RBBB 2018    CAD (coronary artery disease) 2018    Chronic diastolic heart failure ([facility] ) 2018    Pleural effusion on right 10/31/2017    COPD, severe ([address] ) 2017    Diabetic neuropathy ([facility] ) 2017    Essential hypertension 2016    Venous stasis dermatitis of both lower extremities 11/15/2016    Type 2 diabetes mellitus with hyperglycemia, with long-term current use of insulin ([facility] ) 2016    COPD exacerbation ([facility] ) 2016    HLD (hyperlipidemia) 2016      LOS (days): 0  Geometric Mean LOS (GMLOS) (days):   Days to GMLOS:     OBJECTIVE:              Current admission status: Observation       Preferred Pharmacy:   [facility], [address] [facility] [address] [place] [address]  Phone: [phone] Fax: [phone]    Primary Care Provider: Keren Van MD    Primary Insurance: [facility]  Secondary Insurance:     ASSESSMENT:  [facility] [address], [address] [facility] Representative - Son   Primary Phone: [phone] (Mobile)               Advance Directives  Does patient have a [address] [facility] [address]?: No  Was patient offered paperwork?: Yes  Does patient currently have a Health Care decision maker?: No  Does patient have Advance Directives?: No  Was patient offered paperwork?: Yes         Readmission Root Cause  30 Day Readmission: Yes  Who directed you to return to the hospital?: Self  Did you understand whom to contact if you had questions or problems?: Yes  Did you get your prescriptions before you left the hospital?: Yes  Were you able to get your prescriptions filled when you left the hospital?: Yes  Did you take your medications as prescribed?: Yes  Were you able to get to your follow-up appointments?: Yes  During previous admission, was a post-acute recommendation made?: Yes  Patient was readmitted due to: back pain  Action Plan: awaiting medical clearance and therapy recs    Patient Information  Admitted from[de-identified] Home  Mental Status: Alert  During Assessment patient was accompanied by: Not accompanied during assessment  Assessment information provided by[de-identified] Patient  Primary Caregiver: Self  Support Systems: Self, Son  Home entry access options  Select all that apply : Stairs  Number of steps to enter home  : 1  Do the steps have railings?: Yes  Type of Current Residence: [address] [place] [address] home  Upon entering residence, is there a bedroom on the main floor (no further steps)?: Yes  Upon entering residence, is there a bathroom on the main floor (no further steps)?: Yes  In the last 12 months, was there a time when you were not able to pay the mortgage or rent on time?: No  In the last 12 months, how many places have you lived?: 1  In the last 12 months, was there a time when you did not have a steady place to sleep or slept in a shelter (including now)?: No  Homeless/housing insecurity resource given?: N/A  Living Arrangements: Lives w/ Son  Is patient a ?: No    Activities of Daily Living Prior to Admission  Functional Status: Independent  Completes ADLs independently?: Yes  Ambulates independently?: Yes  Does patient use assisted devices?: Yes  Assisted Devices (DME) used:  Other (Comment), Straight Cane, Walker (2 hover rounds, scooter)  Does patient have a history of Outpatient Therapy (PT/OT)?: No  Does the patient have a history of Short-Term Rehab?: No  Does patient have a history of HHC?: Yes (unsure of agency)  Does patient currently have [facility]?: No         Patient Information Continued  Income Source: Pension/intermediate  Does patient have prescription coverage?: Yes  Within the past 12 months, you worried that your food would run out before you got the money to buy more : Never true  Within the past 12 months, the food you bought just didn't last and you didn't have money to get more : Never true  Food insecurity resource given?: N/A  Does patient receive dialysis treatments?: No  Does patient have a history of substance abuse?: No  Does patient have a history of Mental Health Diagnosis?: No         Means of Transportation  Means of Transport to Appts[de-identified] Drives Self  In the past 12 months, has lack of transportation kept you from medical appointments or from getting medications?: No  In the past 12 months, has lack of transportation kept you from meetings, work, or from getting things needed for daily living?: No  Was application for public transport provided?: N/A        DISCHARGE DETAILS:    Discharge planning discussed with[de-identified] Patient  Freedom of Choice: Yes  Comments - Freedom of Choice: Discussed fOC  CM contacted family/caregiver?: No- see comments (Declined)    Contacts  Patient Contacts: Mariaelena Rivera  Relationship to Patient[de-identified] Family  Contact Method: Phone  Phone Number: [phone]  Reason/Outcome: Continuity of Care, Discharge Planning, Emergency Contact     CM reviewed d/c planning process including the following: identifying help at home, patient preference for d/c planning needs, Discharge Lounge, [facility] Meds to Bed program, availability of treatment team to discuss questions or concerns patient and/or family may have regarding understanding medications and recognizing signs and symptoms once discharged  CM also encouraged patient to follow up with all recommended appointments after discharge  Patient advised of importance for patient and family to participate in managing patients medical well being  Information obtained from patient, lives in 2 story home with son, 1 VINI, has 1st floor set up  IADLS, has scooter, 2 hover rounds, walker and canes at home  States he does not currently use DME  Retired , drives self  States he has had 2 covid vaccines

## 2022-07-06 NOTE — UTILIZATION REVIEW
Initial Clinical Review    Admission: Date/Time/Statement:   Admission Orders (From admission, onward)     Ordered        07/05/22 2109  Place in Observation  Once                      Orders Placed This Encounter   Procedures    Place in Observation     Standing Status:   Standing     Number of Occurrences:   1     Order Specific Question:   Level of Care     Answer:   Med Surg [16]     ED Arrival Information     Expected   -    Arrival   7/5/2022 17:21    Acuity   Urgent            Means of arrival   Wheelchair    Escorted by   Family Member    Service   Hospitalist    Admission type   Urgent            Arrival complaint   back pain           Chief Complaint   Patient presents with    Back Pain     Patient complains of left lower back pain  Denies any injury  Initial Presentation: 78 y o  male with a pmh of chronic bilateral back pain without sciatica, a recent history of epididymitis treated with ciprofloxacin and was put on oxycodone 7 5 mg bid prn, CAD  COPD, HTN, HLD, DM2 with use of insulin with chronic neuropathy, Lung adeno carcinoma with degenerative disc disease  He presents to the ED with an exacerbation of back pain, he reports he woke up with it, he denies having strained or carried any heavy equipment  He denies any recent trauma or sudden unexpected movement  He reports the pain is located at the left lumbar area and is excruciating that he cannot walk  He is admitted to observation status due to his intractable back pain  Concern that he may have radiculopathy, he claims numbness ad tingling of bilateral lower extremities as well as bilateral hands, this may be related to neuropathy  Date: 7/6/22     Day 2:  PT presented with low back pain  MRI without acute findings  Pain somewhat improved  PT/OT recommend home with therapy  He requested additional Oxycodone his home meds are "low", PDMP checked, no additional opioids will be prescribed on discharge   He should follow with his PCP and or pain management  ED Triage Vitals [07/05/22 1733]   Temperature Pulse Respirations Blood Pressure SpO2   (!) 97 2 °F (36 2 °C) 105 18 127/73 95 %      Temp Source Heart Rate Source Patient Position - Orthostatic VS BP Location FiO2 (%)   Temporal Monitor Sitting Left arm --      Pain Score       9          Wt Readings from Last 1 Encounters:   07/05/22 99 8 kg (220 lb)     Additional Vital Signs:   Date/Time Temp Pulse Resp BP MAP (mmHg) SpO2 O2 Device Patient Position - Orthostatic VS   07/05/22 2249 -- -- -- -- -- -- None (Room air) --   07/05/22 21:58:16 98 3 °F (36 8 °C) 101 18 130/62 85 92 % -- --   07/05/22 1952 -- 103 18 155/72 103 95 % None (Room air) Sitting             Pertinent Labs/Diagnostic Test Results:   XR orbits for foreign body   Final Result by Raguel Phalen, MD (07/06 0009)      No radiopaque orbital foreign body  Punctate metallic density noted within the soft tissues along the anterior left lower jaw of uncertain etiology/clinical significance  Correlate clinically  Workstation performed: [id number]         CT renal stone study abdomen pelvis wo contrast   Final Result by Raguel Phalen, MD (07/05 2113)      No evidence of obstructive uropathy or nephroureterolithiasis  Redemonstrated small right pleural effusion and probable rounded atelectasis at the right base similar in appearance to prior           Workstation performed: [id number]             MRI Lumbar Spine - 7/6 - pending      Results from last 7 days   Lab Units 07/06/22 0437 07/05/22 1946   WBC Thousand/uL 6 17 9 01   HEMOGLOBIN g/dL 10 2* 11 8*   HEMATOCRIT % 32 2* 38 0   PLATELETS Thousands/uL 115* 152   NEUTROS ABS Thousands/µL 4 05 6 52         Results from last 7 days   Lab Units 07/06/22 0437 07/05/22 1946   SODIUM mmol/L 135* 137   POTASSIUM mmol/L 4 2 4 6   CHLORIDE mmol/L 100 102   CO2 mmol/L 28 29   ANION GAP mmol/L 7 6   BUN mg/dL 39* 35*   CREATININE mg/dL 1 60* 1 74*   EGFR ml/min/1 73sq m 41 37   CALCIUM mg/dL 8 1* 9 1   MAGNESIUM mg/dL 2 0  --    PHOSPHORUS mg/dL 3 4  --      Results from last 7 days   Lab Units 07/06/22  0437 07/05/22  1946   AST U/L 11 17   ALT U/L 19 24   ALK PHOS U/L 72 85   TOTAL PROTEIN g/dL 6 5 7 3   ALBUMIN g/dL 2 5* 3 0*   TOTAL BILIRUBIN mg/dL 0 31 0 28     Results from last 7 days   Lab Units 07/06/22  0437 07/05/22 1946   GLUCOSE RANDOM mg/dL 394* 290*       BETA-HYDROXYBUTYRATE   Date Value Ref Range Status   06/15/2019 0 2 <0 6 mmol/L Final        Results from last 7 days   Lab Units 07/05/22 1946   CLARITY UA  Clear   COLOR UA  Light Yellow   SPEC GRAV UA  1 015   PH UA  6 0   GLUCOSE UA mg/dl 70 (7/100%)*   KETONES UA mg/dl Negative   BLOOD UA  Negative   PROTEIN UA mg/dl 100 (2+)*   NITRITE UA  Negative   BILIRUBIN UA  Negative   UROBILINOGEN UA (BE) mg/dl <2 0   LEUKOCYTES UA  Negative   WBC UA /hpf 2-4*   RBC UA /hpf 1-2   BACTERIA UA /hpf Occasional   EPITHELIAL CELLS WET PREP /hpf Occasional       ED Treatment:   Medication Administration from 07/05/2022 1721 to 07/05/2022 2145       Date/Time Order Dose Route Action Comments     07/05/2022 1948 acetaminophen (TYLENOL) tablet 650 mg 650 mg Oral Given      07/05/2022 1949 lidocaine (LIDODERM) 5 % patch 1 patch 1 patch Topical Medication Applied         Past Medical History:   Diagnosis Date    Abdominal pain     MARANDA (acute kidney injury) ([facility] ) 6/19/2018    Cardiac disease     [facility]estive [facility] failure) ([facility])     COPD, severe (HCC)     Coronary artery disease     DDD (degenerative disc disease), lumbar 9/1/2020    Diabetes mellitus ([facility] )     Dyspnea     GERD (gastroesophageal reflux disease)     Hyperlipidemia     Hypertension     MI (myocardial infarction) ([facility] )     with 3 stents    Nodule of apex of right lung     JACQUELINE (obstructive sleep apnea)     Prostate cancer (HCC)      Present on Admission:   Chronic left-sided low back pain without sciatica   CAD (coronary artery disease)   COPD, severe (HCC)   CKD (chronic kidney disease) stage 3, GFR 30-59 ml/min (HCC)   Essential hypertension   HLD (hyperlipidemia)      Admitting Diagnosis: Back pain [M54 9]  Urinary incontinence [R32]  Ambulatory dysfunction [R26 2]  Age/Sex: 78 y o  male       Admission Orders:  Scheduled Medications:  acetaminophen, 975 mg, Oral, Q8H [address]  aspirin, 81 mg, Oral, Daily  carvedilol, 6 25 mg, Oral, BID With Meals  cyclobenzaprine, 10 mg, Oral, BID  enoxaparin, 40 mg, Subcutaneous, Daily  ferrous sulfate, 325 mg, Oral, Daily With Breakfast  fluticasone-vilanterol, 1 puff, Inhalation, Daily  furosemide, 40 mg, Oral, Daily  gabapentin, 100 mg, Oral, TID  insulin lispro, 1-5 Units, Subcutaneous, HS  insulin lispro, 1-6 Units, Subcutaneous, TID AC  insulin regular, 45 Units, Subcutaneous, TID AC  lidocaine, 1 patch, Topical, Once - placed on 7/5  lisinopril, 10 mg, Oral, Daily  potassium chloride, 20 mEq, Oral, Daily  pravastatin, 80 mg, Oral, Daily With Dinner  tamsulosin, 0 4 mg, Oral, Daily With Dinner  umeclidinium bromide, 1 puff, Inhalation, Daily  Dilaudid 0 5 mg iv once - 7/5 x1       Continuous IV Infusions:  multi-electrolyte, 100 mL/hr, Intravenous, Continuous      PRN Meds:  aluminum-magnesium hydroxide-simethicone, 30 mL, Oral, Q6H PRN  docusate sodium, 100 mg, Oral, BID PRN  HYDROmorphone, 0 5 mg, Intravenous, Q1H PRN  ondansetron, 4 mg, Intravenous, Q6H PRN  oxyCODONE, 10 mg, Oral, Q4H PRN  oxyCODONE, 5 mg, Oral, Q4H PRN  Ativan 1 mg iv once - 7/6 x 1       Nursing Orders - VS - SCD's to le's - Aqua K pad - up as tolerated  with assistance - measure post void residual - cardiac diet  - PT/OT evaluation       Network Utilization Review Department  ATTENTION: Please call with any questions or concerns to [phone] and carefully listen to the prompts so that you are directed to the right person   All voicemails are confidential   [place] Blake all requests for admission clinical reviews, approved or denied determinations and any other requests to dedicated fax number below belonging to the campus where the patient is receiving treatment   List of dedicated fax numbers for the Facilities:  [address] [place] [address] DENIALS (Administrative/Medical Necessity) [phone]   [address] (Maternity/NICU/Pediatrics) [phone]   [address]  [address] Medical [place] [address] [address] [place] [address] [address] [facility] [phone]

## 2022-07-06 NOTE — CASE MANAGEMENT
Case Management Discharge Planning Note    Patient name Shira Manuel   [facility] 893-63 MRN [id number]  : 1945 Date 2022       Current Admission Date: 2022  Current Admission Diagnosis:Chronic left-sided low back pain without sciatica   Patient Active Problem List    Diagnosis Date Noted    Epididymitis, bilateral 06/15/2022    Acute cystitis without hematuria 06/15/2022    Acute [facility] failure [facility] hypoxia [facility] 2022    Chronic left-sided low back pain without sciatica 2022    CKD (chronic kidney disease) stage 3, GFR 30-59 ml/min ([facility] ) 2022    History of prostate cancer 2022    Adenocarcinoma of lung ([facility] ) 2022    Fracture of navicular bone of left foot 2021    Chronic respiratory failure with hypoxia ([facility] ) 04/15/2021    PAD (peripheral artery disease) ([facility] ) 2021    DDD (degenerative disc disease), lumbar 2020    Anemia, iron deficiency 2020    Lung nodule 2020    Pulmonary hypertension ([facility] ) 2019    JACQUELINE (obstructive sleep apnea) 2019    COPD with acute exacerbation ([facility] ) 2019    Oxygen dependent 2018    RBBB 2018    CAD (coronary artery disease) 2018    Chronic diastolic heart failure ([facility] ) 2018    Pleural effusion on right 10/31/2017    COPD, severe ([facility] ) 2017    Diabetic neuropathy ([facility] ) 2017    Essential hypertension 2016    Venous stasis dermatitis of both lower extremities 11/15/2016    Type 2 diabetes mellitus with hyperglycemia, with long-term current use of insulin ([facility] ) 2016    COPD exacerbation ([facility] ) 2016    HLD (hyperlipidemia) 2016      LOS (days): 0  Geometric Mean LOS (GMLOS) (days):   Days to GMLOS:     OBJECTIVE:            Current admission status: Observation   Preferred Pharmacy:   [address] [place], [place] [address]  Phone: [phone] Fax: [phone]    Primary Care Provider: Kay Elizabeth MD    [place] [facility] [place] Insurance:     DISCHARGE DETAILS:    Discharge planning discussed with[de-identified] Patient  Freedom of Choice: Yes  Comments - Freedom of Choice: Discussed [address]         Is the patient interested in [facility] at discharge?: Yes  [address] requested[de-identified] Occupational Therapy, Physical [address] Name[de-identified] [address] [place] [address] Provider[de-identified] PCP  Home Health Services Needed[de-identified] Strengthening/Theraputic Exercises to Improve Function, Evaluate Functional Status and Safety  Homebound Criteria Met[de-identified] Uses an Assist Device (i e  cane, walker, etc), Requires the Assistance of Another Person for Safe Ambulation or to Leave the Home  Supporting Clincal Findings[de-identified] Limited Endurance, Fatigues Easliy in [employer]

## 2022-07-06 NOTE — WOUND OSTOMY CARE
Progress Note - Wound   Grady Damico Sr  78 y o  male MRN: [id number]  Unit/Bed#: [facility] 604-01 Encounter: [id number]      Assessment:  Wound care consulted for assessment of b/l lower extremity wounds and L forearm wounds  Patient admitted with sciatica  History of - CKD, CAD, COPD, HTN, DM, and HLD  Patient seen in bed, alert and oriented x  4, cooperative and agreeable for the assessment  Patient is ambulatory at baseline  Occasional urinary incontinence noted in nursing flow-sheets  Patient denies fecal incontinence  Independent with turning for the assessment  B/l heels are intact without redness or wounds  Dry skin noted  B/l sacrum and buttocks are dry and intact without redness or wounds  No maceration  1  L posterior forearm - 2 partial thickness wounds measured together  Patient reports this occurred during a fall  Wound beds are 100% moist pink/red tissues  Edges fragile and attached without maceration  Small amount of scabbing noted  Karina-wounds are intact with ecchymosis  -will recommend dermagran gauze and dry dressing  2  B/l lower extremities are intact with hemosiderin staining, dry skin and scattered areas of well adhered dry scabbing  No drainage or warmth noted  Recommend to apply skin nourishing cream to the skin  Educated patient on the importance of frequent offloading of pressure via turning, repositioning and weight-shifting  Verbalized understanding of plan of care  No induration, fluctuance, odor, warmth/temperature differences, redness, or purulence noted to the above noted wounds and skin areas assessed  New dressings applied  Patient tolerated assessment well- denies pain and no s/s of non-verbal pain or discomfort observed during the encounter  Primary nurse aware of plan of care  See flow sheets for more detailed assessment findings  Plan:   1  Apply hydraguard to b/l heels, buttocks and sacrum BID and PRN for prevention and protection  2   Apply skin nourishing cream the entire skin daily for moisture  3  Turn and reposition patient every  2 hours   4  Elevate heels off of bed with pillows to offload pressure   5  Apply EHOB waffle cushion to chair when OOB, if able  6  Cleanse L posterior forearm wounds with NSS, pat dry, and apply dermagran gauze to the wound beds (cut to fit size of the wounds)  Cover with 4x4 gauze  Secure the dressings with savage wrap  Change every other day and as needed for soilage/dislodgement  Objective:    Vitals: Blood pressure 169/79, pulse 87, temperature 97 7 °F (36 5 °C), resp  rate 17, height 6' (1 829 m), weight 99 8 kg (220 lb), SpO2 91 %  ,Body mass index is 29 84 kg/m²  Wound 06/11/22 Skin tear Arm Left; Lower; Posterior (Active)   Wound Image   07/06/22 1011   Wound Description Beefy red;Pink 07/06/22 1011   Karina-wound Assessment Dry; Intact;Fragile 07/06/22 1011   Wound Length (cm) 4 5 cm 07/06/22 1011   Wound Width (cm) 0 8 cm 07/06/22 1011   Wound Depth (cm) 0 1 cm 07/06/22 1011   Wound Surface Area (cm^2) 3 6 cm^2 07/06/22 1011   Wound Volume (cm^3) 0 36 cm^3 07/06/22 1011   Calculated Wound Volume (cm^3) 0 36 cm^3 07/06/22 1011   Tunneling 0 cm 07/06/22 1011   Tunneling in depth located at 0 07/06/22 1011   Undermining 0 07/06/22 1011   Undermining is depth extending from 0 07/06/22 1011   Drainage Amount Small 07/06/22 1011   Drainage Description Serosanguineous 07/06/22 1011   Non-staged Wound Description Partial thickness 07/06/22 1011   Treatments Cleansed;Site care 07/06/22 1011   Dressing Dermagran gauze;Dry dressing 07/06/22 1011   Wound packed? No 07/06/22 1011   Packing- # removed 0 07/06/22 1011   Packing- # inserted 0 07/06/22 1011   Dressing Changed New 07/06/22 1011   Patient Tolerance Tolerated well 07/06/22 1011   Dressing Status Clean;Dry; Intact 07/06/22 1011         Wound care will sign off at this time, please re-consult if needed    Mona Tracy RN BSN CWON

## 2022-07-06 NOTE — DISCHARGE SUMMARY
Discharge Summary - [place] [address] Internal Medicine    Patient Information: Niles Lobo  78 y o  male MRN: [id number]  Unit/Bed#: [facility] 604-01 Encounter: [id number]    [facility] Physician / Practitioner: YOLANDE Schuler  PCP: Ceferino Marsh MD  Admission Date: 7/5/2022  Discharge Date: 07/06/22    Reason for Admission:  Acute on chronic back pain    Discharge Diagnoses:     Principal Problem:    Chronic left-sided low back pain without sciatica  Active Problems:    HLD (hyperlipidemia)    Type 2 diabetes mellitus with hyperglycemia, with long-term current use of insulin (HCC)    COPD, severe (HCC)    Essential hypertension    CAD (coronary artery disease)    CKD (chronic kidney disease) stage 3, GFR 30-59 ml/min ([facility])  Resolved Problems:    * No resolved hospital problems  *      Consultations During Hospital Stay:  · PT/OT  · Case management    Procedures Performed:     · None    Significant Findings / Test Results:     MRI lumbar spine wo contrast   Final Result by Arlon Lanes, MD (07/06 1026)      1  Please note that there is transitional lumbosacral anatomy  There are 6 non-rib bearing lumbar-type vertebrae  The transitional lumbosacral vertebra is designated as S1 in this report for the purposes of dictation  2   Mild degenerative change without high-grade canal or foraminal stenosis as detailed  Workstation performed: [id number]         XR orbits for foreign body   Final Result by Tiffany Fuentes MD (07/06 0009)      No radiopaque orbital foreign body  Punctate metallic density noted within the soft tissues along the anterior left lower jaw of uncertain etiology/clinical significance  Correlate clinically  Workstation performed: [id number]         CT renal stone study abdomen pelvis wo contrast   Final Result by Tiffany Fuentes MD (07/05 2113)      No evidence of obstructive uropathy or nephroureterolithiasis        Redemonstrated small right pleural effusion and probable rounded atelectasis at the right base similar in appearance to prior  Workstation performed: [id number]             Incidental Findings:   · None    Test Results Pending at Discharge (will require follow up): · None     Outpatient Tests Requested:  · Outpatient follow-up with PCP  · Outpatient follow-up with pain management    Complications:  None    Hospital Course:     Uriah Shrestha is a 78 y o  male patient with past medical history of chronic low back pain, CKD, CAD, recurrent pleural effusion, CAD, hypertension, COPD, type 2 diabetes, hyperlipidemia, obesity who originally presented to the hospital on 7/5/2022 due to low back pain  MRI without acute findings  Pain somewhat improved  PT/OT recommending home with home therapies, this will be coordinated by case management  Patient requesting oxycodone  States he has some at home but is low " I am able to see in EPIC that oxycodone was prescribed on 06/19, patient states he picked this up  PDMP is not reflective of this  No additional opioids will be prescribed on discharge  Patient should follow-up with PCP and/or pain management  Patient states that he previously saw pain management but they worked me out of the practice " Upon brief chart review, I do not see any recent Pain Management notes  Condition at Discharge: stable     Discharge Day Visit / Exam:     Subjective:  Patient reports improvement in back pain  He is agreeable for discharge  Vitals: Blood Pressure: 169/79 (07/06/22 0739)  Pulse: 87 (07/06/22 0739)  Temperature: 97 7 °F (36 5 °C) (07/06/22 0739)  Temp Source: Temporal (07/05/22 1733)  Respirations: 17 (07/06/22 0739)  Height: 6' (182 9 cm) (07/05/22 2100)  Weight - Scale: 99 8 kg (220 lb) (07/05/22 2100)  SpO2: 91 % (07/06/22 0739)  Exam:   Physical Exam  Vitals and nursing note reviewed  Constitutional:       Appearance: He is obese  Cardiovascular:      Rate and Rhythm: Normal rate     Pulmonary: Breath sounds: Normal breath sounds  Abdominal:      Tenderness: There is no abdominal tenderness  Musculoskeletal:         General: No swelling  Skin:     General: Skin is warm  Neurological:      Mental Status: He is alert and oriented to person, place, and time  Mental status is at baseline  Psychiatric:         Mood and Affect: Affect is flat  Discussion with Family: Patient and son over phone     Discharge instructions/Information to patient and family:   See after visit summary for information provided to patient and family  Provisions for Follow-Up Care:  See after visit summary for information related to follow-up care and any pertinent home health orders  Disposition:     Home with [facility] Services (Reminder: Complete face to face encounter)    For Discharges to [facility]:   · Not Applicable to this Patient - Not Applicable to this Patient    Planned Readmission: no     Discharge Statement:  I spent 40 minutes discharging the patient  This time was spent on the day of discharge  I had direct contact with the patient on the day of discharge  Greater than 50% of the total time was spent examining patient, answering all patient questions, arranging and discussing plan of care with patient as well as directly providing post-discharge instructions  Additional time then spent on discharge activities  Discharge Medications:  See after visit summary for reconciled discharge medications provided to patient and family        ** Please Note: This note has been constructed using a voice recognition system **

## 2022-07-06 NOTE — ASSESSMENT & PLAN NOTE
There was a concern of whether the patient may have radiculopathy however currently claiming numbness and tingling of bilateral lower extremities as well as bilateral hands which may be related to neuropathy  Straight leg testing is negative  MRI of lumbosacral back  Continue xylocaine patch  Recently placed on oxycodone 7 5 mg twice daily p r n ; will place adult pain protocol for now for the back pain  (oxycodone 5, 10, Dilaudid 0 5)  Physical and occupational therapy with case management consult  Continue Neurontin 100 mg 3 times daily

## 2022-07-06 NOTE — PLAN OF CARE
Problem: PHYSICAL THERAPY ADULT  Goal: Performs mobility at highest level of function for planned discharge setting  See evaluation for individualized goals  Description: Treatment/Interventions: Functional transfer training, LE strengthening/ROM, Elevations, Therapeutic exercise, Endurance training, Patient/family training, Equipment eval/education, Bed mobility, Gait training, Spoke to nursing, OT  Equipment Recommended: Merari Innocent       See flowsheet documentation for full assessment, interventions and recommendations  Note: Prognosis: Good  Problem List: Decreased strength, Decreased endurance, Impaired balance, Decreased mobility, Pain, Decreased safety awareness, Decreased cognition  Assessment: Pt is 78 y o  male seen for PT evaluation at [facility] on 7/5/2022  Two pt identifiers were used to confirm  Pt presented w/ exacerbation of chronic back pain  Pt with a primary dx of:  Chronic left-sided low back pain without sciatica, and other active problems including CKD, CAD, essential HTN, COPD, type 2 DM, HLD  PT now consulted for assessment of mobility and d/c needs  Pt with Up and OOB as tolerated  orders  Pts current co morbidities affecting treatment include:  CHF, COPD, CAD, degenerative disc disease, DM, dyspnea, HLD, HTN, mi, obstructive sleep apnea, prostate cancer  Pts current clinical presentation is Unstable/ Unpredictable (high complexity) due to Ongoing medical management for primary dx, Decreased activity tolerance compared to baseline, Fall risk, Increased assistance needed from caregiver at current time, Cog status, Continuous pulse oximetry monitoring     Upon evaluation, pt currently is requiring Supervision for bed mobility; Min Ax1 for transfers and Min Ax1 for ambulation w/ RW   Pt presents at PT eval functioning below baseline and currently w/ overall mobility deficits 2* to: BLE weakness, impaired balance, decreased endurance, gait deviations, pain, decreased activity tolerance compared to baseline, decreased safety awareness, fall risk  Pt currently at a fall risk 2* to impairments listed above  Based on the aforementioned PT evaluation, pt will continue to benefit from skilled Acute PT interventions to address stated impairments; to maximize functional mobility; for ongoing pt/ family training; and DME needs  At conclusion of PT session pt returned back in chair and chair alarm engaged with phone and call bell within reach  Pt denies any further questions at this time  PT is currently recommending Home PT and Home with increased family support  PT will continue to follow during hospital stay  Barriers to Discharge Comments: Patient denies any mobility or safety concerns about returning home at time of discharge     PT Discharge Recommendation: Home with home health rehabilitation          See flowsheet documentation for full assessment

## 2022-07-06 NOTE — PLAN OF CARE
Problem: PAIN - ADULT  Goal: Verbalizes/displays adequate comfort level or baseline comfort level  Description: Interventions:  - Encourage patient to monitor pain and request assistance  - Assess pain using appropriate pain scale  - Administer analgesics based on type and severity of pain and evaluate response  - Implement non-pharmacological measures as appropriate and evaluate response  - Consider cultural and social influences on pain and pain management  - Notify physician/advanced practitioner if interventions unsuccessful or patient reports new pain  Outcome: Progressing     Problem: INFECTION - ADULT  Goal: Absence or prevention of progression during hospitalization  Description: INTERVENTIONS:  - Assess and monitor for signs and symptoms of infection  - Monitor lab/diagnostic results  - Monitor all insertion sites, i e  indwelling lines, tubes, and drains  - Monitor endotracheal if appropriate and nasal secretions for changes in amount and color  - [place] appropriate cooling/warming therapies per order  - Administer medications as ordered  - Instruct and encourage patient and family to use good hand hygiene technique  - Identify and instruct in appropriate isolation precautions for identified infection/condition  Outcome: Progressing  Goal: Absence of fever/infection during neutropenic period  Description: INTERVENTIONS:  - Monitor WBC    Outcome: Progressing     Problem: SAFETY ADULT  Goal: Patient will remain free of falls  Description: INTERVENTIONS:  - Educate patient/family on patient safety including physical limitations  - Instruct patient to call for assistance with activity   - Consult OT/PT to assist with strengthening/mobility   - Keep Call bell within reach  - Keep bed low and locked with side rails adjusted as appropriate  - Keep care items and personal belongings within reach  - Initiate and maintain comfort rounds  - Make Fall Risk Sign visible to staff  - Offer Toileting every  Hours, in advance of need  - Initiate/Maintain alarm  - Obtain necessary fall risk management equipment:   - Apply yellow socks and bracelet for high fall risk patients  - Consider moving patient to room near nurses station  Outcome: Progressing  Goal: Maintain or return to baseline ADL function  Description: INTERVENTIONS:  -  Assess patient's ability to carry out ADLs; assess patient's baseline for ADL function and identify physical deficits which impact ability to perform ADLs (bathing, care of mouth/teeth, toileting, grooming, dressing, etc )  - Assess/evaluate cause of self-care deficits   - Assess range of motion  - Assess patient's mobility; develop plan if impaired  - Assess patient's need for assistive devices and provide as appropriate  - Encourage maximum independence but intervene and supervise when necessary  - Involve family in performance of ADLs  - Assess for home care needs following discharge   - Consider OT consult to assist with ADL evaluation and planning for discharge  - Provide patient education as appropriate  Outcome: Progressing  Goal: Maintains/Returns to pre admission functional level  Description: INTERVENTIONS:  - Perform BMAT or MOVE assessment daily    - Set and communicate daily mobility goal to care team and patient/family/caregiver  - Collaborate with rehabilitation services on mobility goals if consulted  - Perform Range of Motion  times a day  - Reposition patient every  hours    - Dangle patient  times a day  - Stand patient times a day  - Ambulate patient  times a day  - Out of bed to chair  times a day   - Out of bed for meals times a day  - Out of bed for toileting  - Record patient progress and toleration of activity level   Outcome: Progressing     Problem: DISCHARGE PLANNING  Goal: Discharge to home or other facility with appropriate resources  Description: INTERVENTIONS:  - Identify barriers to discharge w/patient and caregiver  - Arrange for needed discharge resources and transportation as appropriate  - Identify discharge learning needs (meds, wound care, etc )  - Arrange for interpretive services to assist at discharge as needed  - Refer to Case Management Department for coordinating discharge planning if the patient needs post-hospital services based on physician/advanced practitioner order or complex needs related to functional status, cognitive ability, or social support system  Outcome: Progressing     Problem: Knowledge Deficit  Goal: Patient/family/caregiver demonstrates understanding of disease process, treatment plan, medications, and discharge instructions  Description: Complete learning assessment and assess knowledge base    Interventions:  - Provide teaching at level of understanding  - Provide teaching via preferred learning methods  Outcome: Progressing

## 2022-07-06 NOTE — PLAN OF CARE
Problem: OCCUPATIONAL THERAPY ADULT  Goal: Performs self-care activities at highest level of function for planned discharge setting  See evaluation for individualized goals  Description: Treatment Interventions: ADL retraining, Functional transfer training, Endurance training, Cognitive reorientation, Patient/family training, Equipment evaluation/education, Compensatory technique education, Energy conservation, Activityengagement          See flowsheet documentation for full assessment, interventions and recommendations  Note: Limitation: Decreased ADL status, Decreased Safe judgement during ADL, Decreased cognition, Decreased endurance, Decreased self-care trans, Decreased high-level ADLs  Prognosis: Good  Assessment: 79 YO Male SEEN FOR INITIAL OCCUPATIONAL THERAPY EVALUATION FOLLOWING ADMISSION TO [facility] WITH ACUTE ON CHRONIC BACK PAIN  MRI WITHOUT ACUTE FINDINGS  PROBLEMS LIST INCLUDES Abdominal pain, MARANDA (acute kidney injury) ([facility] ), Cardiac disease, CHF (congestive heart failure) ([facility] ), COPD, severe ([facility] ), Coronary artery disease, DDD (degenerative disc disease), lumbar, Diabetes mellitus ([facility] ), Dyspnea, GERD (gastroesophageal reflux disease), Hyperlipidemia, Hypertension, MI (myocardial infarction) ([facility] ), Nodule of apex of right lung, JACQUELINE (obstructive sleep apnea), and Prostate cancer ([facility] )  PT IS FROM HOME WITH SON WHO WORKS NIGHT SHIFT  PT REPORTS BEING INDEPENDENT WITH ADLS/LT IADLS/DRIVING PTA  PT CURRENTLY REQUIRES OVERALL MIN-MOD A WITH ADLS, AND MIN A WITH TRANSFERS /FUNCTIONAL MOBILITY WITH USE OF RW  PT IS LIMITED 2' PAIN, FATIGUE, IMPAIRED BALANCE, FALL RISK , LIMITED SAFETY AWARENESS/INSIGHT/JUDGEMENT, OVERALL WEAKNESS/DECONDITIONING , SOB and OVERALL LIMITED ACTIVITY TOLERANCE   PT EDUCATED ON DEEP BREATHING TECHNIQUES T/O ACTIVITY, SLOWING OF PACE, ENERGY CONSERVATION TECHNIQUES FOR CARRY OVER UPON D/C, INCREASED FAMILY SUPPORT and CONTINUE PARTICIPATION IN SELF-CARE/MOBILITY WITH STAFF WHILE IN THE HOSPITAL   The patient's raw score on the AM-PAC Daily Activity inpatient short form is 16, standardized score is 35 96, less than 39 4  Patients at this level are likely to benefit from discharge to post-acute rehabilitation services  Please refer to the recommendation of the Occupational Therapist for safe discharge planning  FROM AN OCCUPATIONAL THERAPY PERSPECTIVE, PT CAN RETURN HOME WITH INCREASED FAMILY SUPPORT + HOME OT SERVICES UPON D/C  WILL CONT TO FOLLOW TO ADDRESS THE BELOW DESCRIBED GOALS    Recommendation: Geriatric Consult  OT Discharge Recommendation: Home with home health rehabilitation (PENDING PT PROGRESS)

## 2022-07-07 ENCOUNTER — HOME CARE VISIT (OUTPATIENT)
Dept: HOME HEALTH SERVICES | Facility: HOME HEALTHCARE | Age: 77
End: 2022-07-07

## 2022-07-07 VITALS
HEART RATE: 88 BPM | TEMPERATURE: 98.2 F | SYSTOLIC BLOOD PRESSURE: 122 MMHG | RESPIRATION RATE: 16 BRPM | WEIGHT: 220 LBS | OXYGEN SATURATION: 93 % | BODY MASS INDEX: 29.8 KG/M2 | DIASTOLIC BLOOD PRESSURE: 74 MMHG | HEIGHT: 72 IN

## 2022-07-07 LAB — GLUCOSE SERPL-MCNC: 142 MG/DL (ref 65–140)

## 2022-07-07 PROCEDURE — 82948 REAGENT STRIP/BLOOD GLUCOSE: CPT

## 2022-07-07 PROCEDURE — 99239 HOSP IP/OBS DSCHRG MGMT >30: CPT | Performed by: PHYSICIAN ASSISTANT

## 2022-07-07 RX ADMIN — FUROSEMIDE 40 MG: 40 TABLET ORAL at 08:39

## 2022-07-07 RX ADMIN — ENOXAPARIN SODIUM 40 MG: 40 INJECTION SUBCUTANEOUS at 08:40

## 2022-07-07 RX ADMIN — FLUTICASONE FUROATE AND VILANTEROL TRIFENATATE 1 PUFF: 100; 25 POWDER RESPIRATORY (INHALATION) at 08:42

## 2022-07-07 RX ADMIN — GABAPENTIN 100 MG: 100 CAPSULE ORAL at 08:39

## 2022-07-07 RX ADMIN — UMECLIDINIUM 1 PUFF: 62.5 AEROSOL, POWDER ORAL at 08:42

## 2022-07-07 RX ADMIN — POTASSIUM CHLORIDE 20 MEQ: 1500 TABLET, EXTENDED RELEASE ORAL at 08:39

## 2022-07-07 RX ADMIN — LISINOPRIL 10 MG: 10 TABLET ORAL at 08:39

## 2022-07-07 RX ADMIN — CYCLOBENZAPRINE HYDROCHLORIDE 10 MG: 10 TABLET, FILM COATED ORAL at 08:39

## 2022-07-07 RX ADMIN — FERROUS SULFATE TAB 325 MG (65 MG ELEMENTAL FE) 325 MG: 325 (65 FE) TAB at 08:39

## 2022-07-07 RX ADMIN — INSULIN HUMAN 45 UNITS: 500 INJECTION, SOLUTION SUBCUTANEOUS at 08:46

## 2022-07-07 RX ADMIN — ASPIRIN 81 MG: 81 TABLET, COATED ORAL at 08:39

## 2022-07-07 RX ADMIN — CARVEDILOL 6.25 MG: 6.25 TABLET, FILM COATED ORAL at 08:39

## 2022-07-07 RX ADMIN — ACETAMINOPHEN 975 MG: 325 TABLET, FILM COATED ORAL at 05:36

## 2022-07-07 NOTE — PLAN OF CARE
Problem: PAIN - ADULT  Goal: Verbalizes/displays adequate comfort level or baseline comfort level  Description: Interventions:  - Encourage patient to monitor pain and request assistance  - Assess pain using appropriate pain scale  - Administer analgesics based on type and severity of pain and evaluate response  - Implement non-pharmacological measures as appropriate and evaluate response  - Consider cultural and social influences on pain and pain management  - Notify physician/advanced practitioner if interventions unsuccessful or patient reports new pain  Outcome: Adequate for Discharge     Problem: INFECTION - ADULT  Goal: Absence or prevention of progression during hospitalization  Description: INTERVENTIONS:  - Assess and monitor for signs and symptoms of infection  - Monitor lab/diagnostic results  - Monitor all insertion sites, i e  indwelling lines, tubes, and drains  - Monitor endotracheal if appropriate and nasal secretions for changes in amount and color  - [place] appropriate cooling/warming therapies per order  - Administer medications as ordered  - Instruct and encourage patient and family to use good hand hygiene technique  - Identify and instruct in appropriate isolation precautions for identified infection/condition  Outcome: Adequate for Discharge  Goal: Absence of fever/infection during neutropenic period  Description: INTERVENTIONS:  - Monitor WBC    Outcome: Adequate for Discharge     Problem: SAFETY ADULT  Goal: Patient will remain free of falls  Description: INTERVENTIONS:  - Educate patient/family on patient safety including physical limitations  - Instruct patient to call for assistance with activity   - Consult OT/PT to assist with strengthening/mobility   - Keep Call bell within reach  - Keep bed low and locked with side rails adjusted as appropriate  - Keep care items and personal belongings within reach  - Initiate and maintain comfort rounds  - Make Fall Risk Sign visible to staff  - Offer Toileting every so Hours, in advance of need  - Initiate/Maintain alarm  - Obtain necessary fall risk management equipment:   - Apply yellow socks and bracelet for high fall risk patients  - Consider moving patient to room near nurses station  Outcome: Adequate for Discharge  Goal: Maintain or return to baseline ADL function  Description: INTERVENTIONS:  -  Assess patient's ability to carry out ADLs; assess patient's baseline for ADL function and identify physical deficits which impact ability to perform ADLs (bathing, care of mouth/teeth, toileting, grooming, dressing, etc )  - Assess/evaluate cause of self-care deficits   - Assess range of motion  - Assess patient's mobility; develop plan if impaired  - Assess patient's need for assistive devices and provide as appropriate  - Encourage maximum independence but intervene and supervise when necessary  - Involve family in performance of ADLs  - Assess for home care needs following discharge   - Consider OT consult to assist with ADL evaluation and planning for discharge  - Provide patient education as appropriate  Outcome: Adequate for Discharge  Goal: Maintains/Returns to pre admission functional level  Description: INTERVENTIONS:  - Perform BMAT or MOVE assessment daily    - Set and communicate daily mobility goal to care team and patient/family/caregiver  - Collaborate with rehabilitation services on mobility goals if consulted  - Perform Range of Motion times a day  - Reposition patient every  hours    - Dangle patient  times a day  - Stand patient  times a day  - Ambulate patient  times a day  - Out of bed to chair times a day   - Out of bed for meals  times a day  - Out of bed for toileting  - Record patient progress and toleration of activity level   Outcome: Adequate for Discharge     Problem: DISCHARGE PLANNING  Goal: Discharge to home or other facility with appropriate resources  Description: INTERVENTIONS:  - Identify barriers to discharge w/patient and caregiver  - Arrange for needed discharge resources and transportation as appropriate  - Identify discharge learning needs (meds, wound care, etc )  - Arrange for interpretive services to assist at discharge as needed  - Refer to Case Management Department for coordinating discharge planning if the patient needs post-hospital services based on physician/advanced practitioner order or complex needs related to functional status, cognitive ability, or social support system  Outcome: Adequate for Discharge     Problem: Knowledge Deficit  Goal: Patient/family/caregiver demonstrates understanding of disease process, treatment plan, medications, and discharge instructions  Description: Complete learning assessment and assess knowledge base    Interventions:  - Provide teaching at level of understanding  - Provide teaching via preferred learning methods  Outcome: Adequate for Discharge

## 2022-07-07 NOTE — CASE MANAGEMENT
Case Management Progress Note    Patient name Elizabeth Adams  Location [facility] 604/[facility] 091-27 MRN [id number]  : 1945 Date 2022       LOS (days): 0  Geometric Mean LOS (GMLOS) (days):   Days to GMLOS:        OBJECTIVE:        Current admission status: Inpatient  Preferred Pharmacy:   [address] [place] [address]  Phone: [phone] Fax: [phone]    Primary Care Provider: Walter Polk MD    Primary Insurance: [facility]  Secondary Insurance:     PROGRESS NOTE: TC to son, Demar Pastrana, who states he can  patient in about 20 minutes    Provider aware to DC, nurse aware

## 2022-07-07 NOTE — DISCHARGE INSTR - AVS FIRST PAGE
Please follow-up with your primary care provider in 1-2 weeks  Please follow-up with pain, spine management  Referral has been placed in your discharge summary, call him to schedule an appointment  Return to the emergency department if you are having worsening pain, bowel/bladder dysfunction, fevers, or other concerning symptoms

## 2022-07-07 NOTE — ASSESSMENT & PLAN NOTE
· On Incruse; continue Brio    · Does not appear to be in acute exacerbation at this time  · Outpatient follow-up

## 2022-07-07 NOTE — ASSESSMENT & PLAN NOTE
Lab Results   Component Value Date    EGFR 41 07/06/2022    EGFR 37 07/05/2022    EGFR 54 06/18/2022    CREATININE 1 60 (H) 07/06/2022    CREATININE 1 74 (H) 07/05/2022    CREATININE 1 29 06/18/2022   · Stable  · Patient may follow-up outpatient with PCP

## 2022-07-07 NOTE — UTILIZATION REVIEW
Continued Stay Review    Date: 7/7/2022                        Current Patient Class:  Observation   Current Level of Care: Med Surg     Pt placed in observation status on 7/5 due to intractable back pain changed to inpatient admission on 7/7 due to need for continued medical treatment  07/07/22 0816  Inpatient Admission         Transfer Service: Hospitalist       Question Answer   Level of Care Med Surg   Estimated length of stay More than 2 Midnights   Certification I certify that inpatient services are medically necessary for this patient for a duration of greater than two midnights  See H&P and MD Progress Notes for additional information about the patient's course of treatment  HPI:76 y o  male initially admitted on 7/5 due to low back pain,  With concern for radiculopathy with numbness and tingling of b/l LE's and b/l hands  CKD, CAD, HTN, COPD, DM2    Wound care consult 7/6 - Pt with b/l lower extremity wounds and L forearm wounds  He reports occasional urinary incontinence, Independent with turning for assessment  B/l heels intact without redness, B/L sacrum and buttocks are dry and intact    1  L posterior forearm - 2 partial thickness wounds measured together  Patient reports this occurred during a fall  Wound beds are 100% moist pink/red tissues  Edges fragile and attached without maceration  Small amount of scabbing noted  Karina-wounds are intact with ecchymosis  -will recommend dermagran gauze and dry dressing         2  B/l lower extremities are intact with hemosiderin staining, dry skin and scattered areas of well adhered dry scabbing  No drainage or warmth noted   Recommend to apply skin nourishing cream to the skin    Plan: Apply hydraguard to b/l heels, buttocks and sacrum bid & prn  Turn and reposition q2  Elevate heels off of bed with pillows to offload pressure  Apply EHOB waffle cushion to chair when OOB  Cleanse L forearm wounds with NSS apply dermagran gauze to the wound beds, cover with 4x4 gauze secure with savage wrap change QOD and prn       Assessment/Plan: 7/7/2022 MRI showed mild degenerative changes  Pt reports still having some pain today, continue Xylocaine patch  He  states that this is more manageable then when he was admitted  Numbness and tingling of b/l lLE's and b/l hands may be related to neuropathy  He has issues with transportation, son is able to transport today  Home with  Home health and follow up with PCP    Vital Signs:   Date/Time Temp Pulse Resp BP MAP (mmHg) SpO2 O2 Device Patient Position - Orthostatic VS   07/07/22 07:53:29 98 2 °F (36 8 °C) 88 16 122/74 90 93 % -- --   07/07/22 0100 -- -- -- -- -- -- None (Room air) --   07/06/22 22:10:17 97 1 °F (36 2 °C) Abnormal  -- 16 155/71 99 -- -- --   07/06/22 1809 -- 93 -- 180/85 Abnormal  -- -- -- Sitting   07/06/22 07:39:47 97 7 °F (36 5 °C) 87 17 169/79 109 91 % -- --   07/06/22 0730 -- -- -- -- -- 90 % None (Room air) --   07/05/22 2249 -- -- -- -- -- -- None (Room air) --   07/05/22 21:58:16 98 3 °F (36 8 °C) 101 18 130/62 85 92 % -- --   07/05/22 1952 -- 103 18 155/72 103 95 % None (Room air) Sitting         Pertinent Labs/Diagnostic Results:     MRI Lumbar Spine - 7/6 - 1   Please note that there is transitional lumbosacral anatomy   There are 6 non-rib bearing lumbar-type vertebrae   The transitional lumbosacral vertebra is designated as S1 in this report for the purposes of dictation  2   Mild degenerative change without high-grade canal or foraminal stenosis            Results from last 7 days   Lab Units 07/06/22  0437 07/05/22  1946   WBC Thousand/uL 6 17 9 01   HEMOGLOBIN g/dL 10 2* 11 8*   HEMATOCRIT % 32 2* 38 0   PLATELETS Thousands/uL 115* 152   NEUTROS ABS Thousands/µL 4 05 6 52         Results from last 7 days   Lab Units 07/06/22  0437 07/05/22  1946   SODIUM mmol/L 135* 137   POTASSIUM mmol/L 4 2 4 6   CHLORIDE mmol/L 100 102   CO2 mmol/L 28 29   ANION GAP mmol/L 7 6   BUN mg/dL 39* 35* CREATININE mg/dL 1 60* 1 74*   EGFR ml/min/1 73sq m 41 37   CALCIUM mg/dL 8 1* 9 1   MAGNESIUM mg/dL 2 0  --    PHOSPHORUS mg/dL 3 4  --      Results from last 7 days   Lab Units 07/06/22 0437 07/05/22  1946   AST U/L 11 17   ALT U/L 19 24   ALK PHOS U/L 72 85   TOTAL PROTEIN g/dL 6 5 7 3   ALBUMIN g/dL 2 5* 3 0*   TOTAL BILIRUBIN mg/dL 0 31 0 28     Results from last 7 days   Lab Units 07/07/22  0752 07/06/22  2102 07/06/22  1657 07/06/22  1102   POC GLUCOSE mg/dl 142* 109 88 276*     Results from last 7 days   Lab Units 07/06/22 0437 07/05/22 1946   GLUCOSE RANDOM mg/dL 394* 290*         Results from last 7 days   Lab Units 07/06/22 0437   HEMOGLOBIN A1C % 10 3*   EAG mg/dl 249     BETA-HYDROXYBUTYRATE   Date Value Ref Range Status   06/15/2019 0 2 <0 6 mmol/L Final        Results from last 7 days   Lab Units 07/05/22 1946   CLARITY UA  Clear   COLOR UA  Light Yellow   SPEC GRAV UA  1 015   PH UA  6 0   GLUCOSE UA mg/dl 70 (7/100%)*   KETONES UA mg/dl Negative   BLOOD UA  Negative   PROTEIN UA mg/dl 100 (2+)*   NITRITE UA  Negative   BILIRUBIN UA  Negative   UROBILINOGEN UA (BE) mg/dl <2 0   LEUKOCYTES UA  Negative   WBC UA /hpf 2-4*   RBC UA /hpf 1-2   BACTERIA UA /hpf Occasional   EPITHELIAL CELLS WET PREP /hpf Occasional         Medications:   Scheduled Medications:  acetaminophen, 975 mg, Oral, Q8H DRAKE  aspirin, 81 mg, Oral, Daily  carvedilol, 6 25 mg, Oral, BID With Meals  cyclobenzaprine, 10 mg, Oral, BID  enoxaparin, 40 mg, Subcutaneous, Daily  ferrous sulfate, 325 mg, Oral, Daily With Breakfast  fluticasone-vilanterol, 1 puff, Inhalation, Daily  furosemide, 40 mg, Oral, Daily  gabapentin, 100 mg, Oral, TID  insulin lispro, 1-5 Units, Subcutaneous, HS  insulin lispro, 1-6 Units, Subcutaneous, TID AC  insulin regular, 45 Units, Subcutaneous, TID AC  lisinopril, 10 mg, Oral, Daily  potassium chloride, 20 mEq, Oral, Daily  pravastatin, 80 mg, Oral, Daily With Dinner  tamsulosin, 0 4 mg, Oral, Daily With Dinner  umeclidinium bromide, 1 puff, Inhalation, Daily      Continuous IV Infusions:    multi-electrolyte (PLASMALYTE-A/ISOLYTE-S PH 7 4) IV solution  Rate: 100 mL/hr Dose: 100 mL/hr  Freq: Continuous Route: IV  Indications of Use: IV Hydration  Last Dose: Stopped (07/06/22 1127)  Start: 07/05/22 2115 End: 07/06/22 1135      PRN Meds:  aluminum-magnesium hydroxide-simethicone, 30 mL, Oral, Q6H PRN  docusate sodium, 100 mg, Oral, BID PRN  HYDROmorphone, 0 5 mg, Intravenous, Q1H PRN  ondansetron, 4 mg, Intravenous, Q6H PRN  oxyCODONE, 10 mg, Oral, Q4H PRN  oxyCODONE, 5 mg, Oral, Q4H PRN  Ativan 1 mg iv once - 7/6 x1     Nursing Orders - VS - Wound care, up & OOB as tolerated - SCD's to le's - diet cardiac - Therapy treatments PT/OT    Discharge Plan:  D     Network Utilization Review Department  ATTENTION: Please call with any questions or concerns to [phone] and carefully listen to the prompts so that you are directed to the right person  All voicemails are confidential   [facility] all requests for admission clinical reviews, approved or denied determinations and any other requests to dedicated fax number below belonging to the campus where the patient is receiving treatment   List of dedicated fax numbers for the Facilities:  [address] DENIALS (Administrative/Medical Necessity) [phone]   [address] (Maternity/NICU/Pediatrics) [phone]   [address]  [address] [place] [address] [address] [place] [address] [address] [phone] [place] [address] [phone]

## 2022-07-07 NOTE — ASSESSMENT & PLAN NOTE
Lab Results   Component Value Date    HGBA1C 10 3 (H) 07/06/2022       Continue home insulin regimen    Recommend outpatient follow-up with Endocrinology given patient's hemoglobin A1c greater than 10    Blood Sugar Average: Last 72 hrs:  (P) 073 62

## 2022-07-07 NOTE — DISCHARGE SUMMARY
[facility]  Discharge- John Conley Sr  1945, 78 y o  male MRN: [id number]  Unit/Bed#: [facility] 604-01 Encounter: [id number]  Primary Care Provider: Aditi Benz MD   Date and time admitted to hospital: 7/5/2022  6:00 PM    * Chronic left-sided low back pain without sciatica  Assessment & Plan  · There was a concern of whether the patient may have radiculopathy however currently claiming numbness and tingling of bilateral lower extremities as well as bilateral hands which may be related to neuropathy  · Straight leg testing is negative  · MRI of lumbosacral back- mild degenerative changes  · Continue xylocaine patch  · Patient stable for discharge at this time with outpatient follow-up with Spine/pain management  · He can follow-up with PCP as well  · Home with home health  · Patient agreeable to discharge at this time  · Reports back pain is improved    Type 2 diabetes mellitus with hyperglycemia, with long-term current use of insulin [facility])  Assessment & Plan  Lab Results   Component Value Date    HGBA1C 10 3 (H) 07/06/2022       Continue home insulin regimen  Recommend outpatient follow-up with Endocrinology given patient's hemoglobin A1c greater than 10    Blood Sugar Average: Last 72 hrs:  (P) 153 75    CKD (chronic kidney disease) stage 3, GFR 30-59 ml/min [facility])  Assessment & Plan  Lab Results   Component Value Date    EGFR 41 07/06/2022    EGFR 37 07/05/2022    EGFR 54 06/18/2022    CREATININE 1 60 (H) 07/06/2022    CREATININE 1 74 (H) 07/05/2022    CREATININE 1 29 06/18/2022   · Stable  · Patient may follow-up outpatient with PCP    CAD (coronary artery disease)  Assessment & Plan  · Continue Coreg 6 25 twice daily  Essential hypertension  Assessment & Plan  · Continue Zestril 10 mg daily  · Lasix 40 mg daily  · Coreg 6 25 twice daily  COPD, severe ([address] )  Assessment & Plan  · On Incruse; continue Brio    · Does not appear to be in acute exacerbation at this time  · Outpatient follow-up    HLD (hyperlipidemia)  Assessment & Plan  · On Pravachol/Zocor  Medical Problems             Resolved Problems  Date Reviewed: 7/6/2022   None               Discharging Physician / Practitioner: Kate Pacheco PA-C  PCP: Ceferino Marsh MD  Admission Date:   Admission Orders (From admission, onward)     Ordered        07/07/22 0815  Inpatient Admission  Once            07/05/22 2109  Place in Observation  Once                      Discharge Date: 07/07/22    Consultations During Hospital Stay:  · None     Procedures Performed:       XR orbits for foreign body    Result Date: 7/6/2022  Narrative: ORBITS INDICATION:   hx of eye injury grinding metal, need clearance for MRI brain  COMPARISON:  None VIEWS:  XR ORBITS FOR FOREIGN BODY FINDINGS: There is no evidence of radiopaque orbital foreign body  Punctate metallic density noted within the soft tissues along the anterior left lower jaw  The paranasal sinuses are clear  Impression: No radiopaque orbital foreign body  Punctate metallic density noted within the soft tissues along the anterior left lower jaw of uncertain etiology/clinical significance  Correlate clinically  Workstation performed: [id number]       MRI lumbar spine wo contrast    Result Date: 7/6/2022  Narrative: MRI LUMBAR SPINE WITHOUT CONTRAST INDICATION: lumbar sacral mRI  COMPARISON:  X-ray lumbar spine 7/21/2017 TECHNIQUE:  Sagittal T1, sagittal T2, sagittal inversion recovery, axial T1 and axial T2, coronal T2   IMAGE QUALITY:  Diagnostic FINDINGS: VERTEBRAL BODIES:  There are 6 non-rib bearing lumbar-type vertebrae  The transitional lumbosacral vertebra is designated as S1 in this report for the purposes of dictation  There is mild dextroscoliosis with apex at L2-3  There is chronic mild compression deformity of superior and inferior endplates at levels L3 and L4  There is L1 left superior endplate hemangioma  SACRUM:  Normal signal within the sacrum   No evidence of insufficiency or stress fracture  DISTAL CORD AND CONUS:  Normal size and signal within the distal cord and conus  PARASPINAL SOFT TISSUES:  Paraspinal soft tissues are unremarkable  Infrarenal abdominal aorta 2 4 cm ectasia, similar to CT 9/28/2017  Punctuate T2 hyperintense foci are noted in the left kidney which are nonspecific, but statistically favoring benign cysts  LOWER THORACIC DISC SPACES:  Normal disc height and signal   No disc herniation, canal stenosis or foraminal narrowing  LUMBAR DISC SPACES: L1-L2:  No significant disc bulge  No canal or foraminal stenosis  L2-L3:  Small right foraminal disc protrusion  No canal stenosis  Minimal right foraminal narrowing  L3-L4:  Minimal bilateral foraminal disc protrusion  Mild facet arthropathy  No significant canal stenosis  No significant foraminal narrowing  L4-L5:  Mild disc bulge  Mild bilateral facet arthropathy  No significant canal stenosis  Mild bilateral foraminal narrowing  L5-S1:  Disc bulge  Mild facet arthropathy  Minimal canal narrowing  Mild bilateral foraminal stenosis  S1-S2: Transitional level  Mild disc bulge  Mild bilateral facet arthropathy  No significant canal stenosis  Mild right foraminal narrowing  Impression: 1  Please note that there is transitional lumbosacral anatomy  There are 6 non-rib bearing lumbar-type vertebrae  The transitional lumbosacral vertebra is designated as S1 in this report for the purposes of dictation  2   Mild degenerative change without high-grade canal or foraminal stenosis as detailed  Workstation performed: [id number]     US scrotum and testicles      CT renal stone study abdomen pelvis wo contrast    Result Date: 7/5/2022  Narrative: CT ABDOMEN AND PELVIS WITHOUT IV CONTRAST - LOW DOSE RENAL STONE INDICATION:   Flank pain, kidney stone suspected rule out kidney stone  COMPARISON:  6/15/2022   TECHNIQUE:  Low radiation dose thin section CT examination of the abdomen and pelvis was performed without intravenous or oral contrast according to a protocol specifically designed to evaluate for urinary tract calculus  Axial, sagittal, and coronal 2D  reformatted images were created from the source data and submitted for interpretation  Evaluation for pathology in the abdomen and pelvis that is unrelated to urinary tract calculi is limited  Radiation dose length product (DLP) for this visit:  790 mGy-cm   This examination, like all CT scans performed in the [facility], was performed utilizing techniques to minimize radiation dose exposure, including the use of iterative reconstruction and automated exposure control  URINARY TRACT FINDINGS: RIGHT KIDNEY AND URETER:  No urinary tract calculi  No hydronephrosis or hydroureter  Stable nonspecific perinephric stranding  LEFT KIDNEY AND URETER:  No urinary tract calculi  No hydronephrosis or hydroureter  Stable nonspecific perinephric stranding  URINARY BLADDER:  Unremarkable  ADDITIONAL FINDINGS: LOWER CHEST:  Redemonstrated volume loss within the right hemithorax and rightward mediastinal shift, small right pleural effusion and rounded atelectasis at the right base similar in appearance to prior  SOLID VISCERA: Limited low radiation dose noncontrast CT evaluation demonstrates no clinically significant abnormality of the imaged portions of the liver, spleen, pancreas, or adrenal glands  GALLBLADDER/BILIARY TREE:  Probable small amount layering stones versus sludge noted  No pericholecystic inflammatory change  No biliary dilatation  STOMACH AND BOWEL:  There is colonic diverticulosis without evidence of acute diverticulitis  APPENDIX:  No findings to suggest appendicitis  ABDOMINOPELVIC CAVITY:  No ascites  No pneumoperitoneum  Stable appearance shotty periaortic/retroperitoneal lymph nodes  No pathologic lymphadenopathy  REPRODUCTIVE ORGANS:  Prostate brachytherapy seeds again noted   ABDOMINAL WALL/INGUINAL REGIONS:  Fat containing left inguinal hernia noted  Previously noted infiltrative changes within the pubic subcutaneous fat have improved  OSSEOUS STRUCTURES:  No acute fracture or destructive osseous lesion  Impression: No evidence of obstructive uropathy or nephroureterolithiasis  Redemonstrated small right pleural effusion and probable rounded atelectasis at the right base similar in appearance to prior  Workstation performed: [id number]       Significant Findings / Test Results:   · As noted above     Incidental Findings:   · None      Test Results Pending at Discharge (will require follow up): · None      Outpatient Tests Requested:  · None     Complications:  None     Reason for Admission: back pain     Hospital Course:   Shadi López  is a 78 y o  male patient who originally presented to the hospital on 7/5/2022 due to chronic lower back pain  Patient originally presented to the hospital 7/5 2 lower back pain  MRI did not show any significant acute findings rather chronic degenerative changes  Pain was improved on discharge according to patient  PT OT recommending home with home therapies  Patient was due to be discharged yesterday, however due to a late discharge time, son unable to take patient up as he works at night  Patient is okay with discharge at this time is some will be here to pick him up today  For further hospital course, please see attached notes  Please see above list of diagnoses and related plan for additional information  Condition at Discharge: good    Discharge Day Visit / Exam:   Subjective:  Patient still having some pain today, but states that this is more manageable then when he first came in     Vitals: Blood Pressure: 122/74 (07/07/22 0753)  Pulse: 88 (07/07/22 0753)  Temperature: 98 2 °F (36 8 °C) (07/07/22 0753)  Temp Source: Temporal (07/05/22 1733)  Respirations: 16 (07/07/22 0753)  Height: 6' (182 9 cm) (07/05/22 2100)  Weight - Scale: 99 8 kg (220 lb) (07/05/22 2100)  SpO2: 93 % (07/07/22 0753)  Exam:   Physical Exam  Vitals and nursing note reviewed  Constitutional:       General: He is not in acute distress  Appearance: He is obese  HENT:      Head: Normocephalic and atraumatic  Eyes:      General:         Left eye: No discharge  Cardiovascular:      Rate and Rhythm: Regular rhythm  Heart sounds: Normal heart sounds  Pulmonary:      Effort: Pulmonary effort is normal       Breath sounds: Normal breath sounds  Abdominal:      General: Abdomen is flat  Palpations: Abdomen is soft  Skin:     General: Skin is warm and dry  Neurological:      Mental Status: Mental status is at baseline  Psychiatric:         Mood and Affect: Mood normal          Discussion with Family: Patient declined call to   Discharge instructions/Information to patient and family:   See after visit summary for information provided to patient and family  Provisions for Follow-Up Care:  See after visit summary for information related to follow-up care and any pertinent home health orders  Disposition:   Home with [facility]NA Services (Reminder: Complete face to face encounter)    Planned Readmission: none      Discharge Statement:  I spent 60 minutes discharging the patient  This time was spent on the day of discharge  I had direct contact with the patient on the day of discharge  Greater than 50% of the total time was spent examining patient, answering all patient questions, arranging and discussing plan of care with patient as well as directly providing post-discharge instructions  Additional time then spent on discharge activities  Discharge Medications:  See after visit summary for reconciled discharge medications provided to patient and/or family        **Please Note: This note may have been constructed using a voice recognition system**

## 2022-07-07 NOTE — ASSESSMENT & PLAN NOTE
· There was a concern of whether the patient may have radiculopathy however currently claiming numbness and tingling of bilateral lower extremities as well as bilateral hands which may be related to neuropathy  · Straight leg testing is negative  · MRI of lumbosacral back- mild degenerative changes  · Continue xylocaine patch    · Patient stable for discharge at this time with outpatient follow-up with Spine/pain management  · He can follow-up with PCP as well  · Home with home health  · Patient agreeable to discharge at this time  · Reports back pain is improved

## 2022-07-08 NOTE — ED ATTENDING ATTESTATION
7/5/2022  IJalen DO, saw and evaluated the patient  I have discussed the patient with the resident/non-physician practitioner and agree with the resident's/non-physician practitioner's findings, Plan of Care, and MDM as documented in the resident's/non-physician practitioner's note, except where noted  All available labs and Radiology studies were reviewed  I was present for key portions of any procedure(s) performed by the resident/non-physician practitioner and I was immediately available to provide assistance  At this point I agree with the current assessment done in the Emergency Department  I have conducted an independent evaluation of this patient a history and physical is as follows:    68yo male presents with acute on chronic back pain  Reports past few days left lower back pain worsening and pt having difficulty ambulating  Denies new LE weakness, no bowel complaints  But does report 2 episodes where he was unable to get to bathroom in time to urinate  No f/c  On exam - nad, heart reg, no resp distres, tenderness left flank  Plan - CT stone study, check urine and labs  If CT and urine normal may need MRI    Likely will need admit    ED Course         Critical Care Time  Procedures

## 2022-07-08 NOTE — UTILIZATION REVIEW
Notification of Discharge   This is a Notification of Discharge from our facility [address]  Please be advised that this patient has been discharge from our facility  Below you will find the admission and discharge date and time including the patients disposition  UTILIZATION REVIEW CONTACT:  Juvenal Stanton  Utilization   Network Utilization Review Department  Phone: [phone] x carefully listen to the prompts  All voicemails are confidential   Email: [email or web address]     PHYSICIAN ADVISORY SERVICES:  FOR [facility] REVIEW - MEDICAL NECESSITY DENIAL  Phone: [phone]  Fax: [phone]  Email: [email or web address]     PRESENTATION DATE: 7/5/2022  6:00 PM  OBERVATION ADMISSION DATE:   INPATIENT ADMISSION DATE: 7/7/22  8:15 AM   DISCHARGE DATE: 7/7/2022 11:03 AM  DISPOSITION: Home with [place] with [address] INFORMATION:  Send all requests for admission clinical reviews, approved or denied determinations and any other requests to dedicated fax number below belonging to the campus where the patient is receiving treatment   List of dedicated fax numbers:  [address] DENIALS (Administrative/Medical Necessity) [phone]   [address] (Maternity/NICU/Pediatrics) [phone]   [facility] [phone] [address] [facility] [phone]   [address] [place] [address] [phone]   2000 [facility] [address] [place] [address],[address] [phone]   [facility]  [phone]   [address] [place] [address]  [address] [phone]

## 2022-07-09 ENCOUNTER — HOME CARE VISIT (OUTPATIENT)
Dept: HOME HEALTH SERVICES | Facility: HOME HEALTHCARE | Age: 77
End: 2022-07-09

## 2022-07-09 NOTE — CASE COMMUNICATION
Notification of Assess not [address] has assessed your patient for Home Health services and has determined the patient is not eligible for service due to the following he declined difficulty with mobility since discharged from hospital   He is ambulating independent household surfaces including steps and outdoor surfaces  He reports there werent any medicine changes and he is independent with his meds  His back pain is get ting better  He reports he is driving    He declined need for Home care intervention at this time

## 2022-07-10 ENCOUNTER — HOME CARE VISIT (OUTPATIENT)
Dept: HOME HEALTH SERVICES | Facility: HOME HEALTHCARE | Age: 77
End: 2022-07-10

## 2022-07-12 ENCOUNTER — TELEPHONE (OUTPATIENT)
Dept: UROLOGY | Facility: AMBULATORY SURGERY CENTER | Age: 77
End: 2022-07-12

## 2022-07-12 NOTE — TELEPHONE ENCOUNTER
Pt called and stated "I am done do not call me back"  Pt does not want the appt that is being held or any appt going forward

## 2022-07-12 NOTE — TELEPHONE ENCOUNTER
JAIME MARINO to see if pt can come in at [facility] f/u epididymo-orchitis and pseudomonal UTI  AP visit in approximately 2 weeks for symptom reassessment and examination  07/11/22 pt called and he can to make the 2:30 but he will be there at 4:00PM    I attempted to reschedule the patient, he was incompliant  He inquired about a wheelchair I provided him with a wheelchair and retreived it at the entrance  Please call to reschedule

## 2022-07-12 NOTE — TELEPHONE ENCOUNTER
Called the patient and left a detailed message on his voice mail to call [phone] to inform him:    An appt is available Monday 7/18 @ 3:15  It is on hold if he is available

## 2022-08-03 NOTE — ASSESSMENT & PLAN NOTE
Counseling provided on diet exercise and weight loss  Continue above management How Severe Is Your Skin Lesion?: moderate Has Your Skin Lesion Been Treated?: not been treated Is This A New Presentation, Or A Follow-Up?: Skin Lesions How Severe Is Your Skin Lesion?: mild Additional History: Per pt Dr. Rutherford may have called them seborrheic keratoses. Pt informed she was told by Dr. Rutherford that lesions could be frozen off in future. Pt noted one on left forehead is itchy.

## 2022-08-15 ENCOUNTER — HOSPITAL ENCOUNTER (INPATIENT)
Facility: HOSPITAL | Age: 77
LOS: 1 days | Discharge: HOME | DRG: 637 | End: 2022-08-16
Attending: PEDIATRICS | Admitting: INTERNAL MEDICINE
Payer: COMMERCIAL

## 2022-08-15 ENCOUNTER — APPOINTMENT (EMERGENCY)
Dept: RADIOLOGY | Facility: HOSPITAL | Age: 77
DRG: 637 | End: 2022-08-15
Payer: COMMERCIAL

## 2022-08-15 DIAGNOSIS — E16.2 HYPOGLYCEMIA: Primary | ICD-10-CM

## 2022-08-15 DIAGNOSIS — R79.89 ELEVATED LACTIC ACID LEVEL: ICD-10-CM

## 2022-08-15 DIAGNOSIS — E11.649 TYPE 2 DIABETES MELLITUS WITH HYPOGLYCEMIA WITHOUT COMA, WITH LONG-TERM CURRENT USE OF INSULIN (HCC): ICD-10-CM

## 2022-08-15 DIAGNOSIS — R41.82 ALTERED MENTAL STATUS: ICD-10-CM

## 2022-08-15 DIAGNOSIS — Z79.4 TYPE 2 DIABETES MELLITUS WITH HYPOGLYCEMIA WITHOUT COMA, WITH LONG-TERM CURRENT USE OF INSULIN (HCC): ICD-10-CM

## 2022-08-15 DIAGNOSIS — E53.8 B12 DEFICIENCY: ICD-10-CM

## 2022-08-15 LAB
ALBUMIN SERPL BCP-MCNC: 3.2 G/DL (ref 3.5–5)
ALP SERPL-CCNC: 88 U/L (ref 46–116)
ALT SERPL W P-5'-P-CCNC: 18 U/L (ref 12–78)
ANION GAP SERPL CALCULATED.3IONS-SCNC: 1 MMOL/L (ref 4–13)
APTT PPP: 26 SECONDS (ref 23–37)
AST SERPL W P-5'-P-CCNC: 21 U/L (ref 5–45)
BACTERIA UR QL AUTO: NORMAL /HPF
BASE EX.OXY STD BLDV CALC-SCNC: 49.9 % (ref 60–80)
BASE EXCESS BLDA CALC-SCNC: 4 MMOL/L (ref -2–3)
BASE EXCESS BLDV CALC-SCNC: 0.7 MMOL/L
BASOPHILS # BLD AUTO: 0.03 THOUSANDS/ΜL (ref 0–0.1)
BASOPHILS NFR BLD AUTO: 0 % (ref 0–1)
BILIRUB SERPL-MCNC: 0.31 MG/DL (ref 0.2–1)
BILIRUB UR QL STRIP: NEGATIVE
BUN SERPL-MCNC: 38 MG/DL (ref 5–25)
CA-I BLD-SCNC: 1.07 MMOL/L (ref 1.12–1.32)
CALCIUM ALBUM COR SERPL-MCNC: 8.8 MG/DL (ref 8.3–10.1)
CALCIUM SERPL-MCNC: 8.2 MG/DL (ref 8.3–10.1)
CARDIAC TROPONIN I PNL SERPL HS: 13 NG/L
CHLORIDE SERPL-SCNC: 106 MMOL/L (ref 96–108)
CK SERPL-CCNC: 87 U/L (ref 39–308)
CLARITY UR: CLEAR
CO2 SERPL-SCNC: 31 MMOL/L (ref 21–32)
COLOR UR: ABNORMAL
CREAT SERPL-MCNC: 1.63 MG/DL (ref 0.6–1.3)
EOSINOPHIL # BLD AUTO: 0.03 THOUSAND/ΜL (ref 0–0.61)
EOSINOPHIL NFR BLD AUTO: 0 % (ref 0–6)
ERYTHROCYTE [DISTWIDTH] IN BLOOD BY AUTOMATED COUNT: 17.1 % (ref 11.6–15.1)
GFR SERPL CREATININE-BSD FRML MDRD: 40 ML/MIN/1.73SQ M
GLUCOSE SERPL-MCNC: 147 MG/DL (ref 65–140)
GLUCOSE SERPL-MCNC: 58 MG/DL (ref 65–140)
GLUCOSE SERPL-MCNC: 67 MG/DL (ref 65–140)
GLUCOSE SERPL-MCNC: 71 MG/DL (ref 65–140)
GLUCOSE SERPL-MCNC: 83 MG/DL (ref 65–140)
GLUCOSE UR STRIP-MCNC: NEGATIVE MG/DL
HCO3 BLDA-SCNC: 33 MMOL/L (ref 24–30)
HCO3 BLDV-SCNC: 30.6 MMOL/L (ref 24–30)
HCT VFR BLD AUTO: 45.2 % (ref 36.5–49.3)
HCT VFR BLD CALC: 41 % (ref 36.5–49.3)
HGB BLD-MCNC: 14.2 G/DL (ref 12–17)
HGB BLDA-MCNC: 13.9 G/DL (ref 12–17)
HGB UR QL STRIP.AUTO: ABNORMAL
IMM GRANULOCYTES # BLD AUTO: 0.08 THOUSAND/UL (ref 0–0.2)
IMM GRANULOCYTES NFR BLD AUTO: 1 % (ref 0–2)
INR PPP: 0.94 (ref 0.84–1.19)
KETONES UR STRIP-MCNC: NEGATIVE MG/DL
LACTATE SERPL-SCNC: 2.2 MMOL/L (ref 0.5–2)
LEUKOCYTE ESTERASE UR QL STRIP: NEGATIVE
LYMPHOCYTES # BLD AUTO: 0.59 THOUSANDS/ΜL (ref 0.6–4.47)
LYMPHOCYTES NFR BLD AUTO: 7 % (ref 14–44)
MCH RBC QN AUTO: 26.9 PG (ref 26.8–34.3)
MCHC RBC AUTO-ENTMCNC: 31.4 G/DL (ref 31.4–37.4)
MCV RBC AUTO: 86 FL (ref 82–98)
MONOCYTES # BLD AUTO: 0.53 THOUSAND/ΜL (ref 0.17–1.22)
MONOCYTES NFR BLD AUTO: 6 % (ref 4–12)
NEUTROPHILS # BLD AUTO: 7.61 THOUSANDS/ΜL (ref 1.85–7.62)
NEUTS SEG NFR BLD AUTO: 86 % (ref 43–75)
NITRITE UR QL STRIP: NEGATIVE
NON-SQ EPI CELLS URNS QL MICRO: NORMAL /HPF
NRBC BLD AUTO-RTO: 0 /100 WBCS
O2 CT BLDV-SCNC: 10.4 ML/DL
PCO2 BLD: 35 MMOL/L (ref 21–32)
PCO2 BLD: 69.2 MM HG (ref 42–50)
PCO2 BLDV: 75.2 MM HG (ref 42–50)
PH BLD: 7.29 [PH] (ref 7.3–7.4)
PH BLDV: 7.23 [PH] (ref 7.3–7.4)
PH UR STRIP.AUTO: 5.5 [PH]
PLATELET # BLD AUTO: 141 THOUSANDS/UL (ref 149–390)
PMV BLD AUTO: 10.5 FL (ref 8.9–12.7)
PO2 BLD: 21 MM HG (ref 35–45)
PO2 BLDV: 31 MM HG (ref 35–45)
POTASSIUM BLD-SCNC: 3.9 MMOL/L (ref 3.5–5.3)
POTASSIUM SERPL-SCNC: 3.7 MMOL/L (ref 3.5–5.3)
PROCALCITONIN SERPL-MCNC: 0.11 NG/ML
PROT SERPL-MCNC: 8.1 G/DL (ref 6.4–8.4)
PROT UR STRIP-MCNC: ABNORMAL MG/DL
PROTHROMBIN TIME: 12.8 SECONDS (ref 11.6–14.5)
RBC # BLD AUTO: 5.28 MILLION/UL (ref 3.88–5.62)
RBC #/AREA URNS AUTO: NORMAL /HPF
SAO2 % BLD FROM PO2: 26 % (ref 60–85)
SODIUM BLD-SCNC: 140 MMOL/L (ref 136–145)
SODIUM SERPL-SCNC: 138 MMOL/L (ref 135–147)
SP GR UR STRIP.AUTO: 1.01 (ref 1–1.03)
SPECIMEN SOURCE: ABNORMAL
UROBILINOGEN UR STRIP-ACNC: <2 MG/DL
WBC # BLD AUTO: 8.87 THOUSAND/UL (ref 4.31–10.16)
WBC #/AREA URNS AUTO: NORMAL /HPF

## 2022-08-15 PROCEDURE — 99291 CRITICAL CARE FIRST HOUR: CPT | Performed by: EMERGENCY MEDICINE

## 2022-08-15 PROCEDURE — 85610 PROTHROMBIN TIME: CPT

## 2022-08-15 PROCEDURE — 82947 ASSAY GLUCOSE BLOOD QUANT: CPT

## 2022-08-15 PROCEDURE — 84132 ASSAY OF SERUM POTASSIUM: CPT

## 2022-08-15 PROCEDURE — 81001 URINALYSIS AUTO W/SCOPE: CPT

## 2022-08-15 PROCEDURE — 83605 ASSAY OF LACTIC ACID: CPT

## 2022-08-15 PROCEDURE — 99291 CRITICAL CARE FIRST HOUR: CPT

## 2022-08-15 PROCEDURE — G1004 CDSM NDSC: HCPCS

## 2022-08-15 PROCEDURE — 87040 BLOOD CULTURE FOR BACTERIA: CPT

## 2022-08-15 PROCEDURE — 85730 THROMBOPLASTIN TIME PARTIAL: CPT

## 2022-08-15 PROCEDURE — 82805 BLOOD GASES W/O2 SATURATION: CPT

## 2022-08-15 PROCEDURE — 70450 CT HEAD/BRAIN W/O DYE: CPT

## 2022-08-15 PROCEDURE — 82550 ASSAY OF CK (CPK): CPT

## 2022-08-15 PROCEDURE — 93005 ELECTROCARDIOGRAM TRACING: CPT

## 2022-08-15 PROCEDURE — 71045 X-RAY EXAM CHEST 1 VIEW: CPT

## 2022-08-15 PROCEDURE — 84145 PROCALCITONIN (PCT): CPT

## 2022-08-15 PROCEDURE — 82948 REAGENT STRIP/BLOOD GLUCOSE: CPT

## 2022-08-15 PROCEDURE — 36415 COLL VENOUS BLD VENIPUNCTURE: CPT

## 2022-08-15 PROCEDURE — 84295 ASSAY OF SERUM SODIUM: CPT

## 2022-08-15 PROCEDURE — 85014 HEMATOCRIT: CPT

## 2022-08-15 PROCEDURE — 82330 ASSAY OF CALCIUM: CPT

## 2022-08-15 PROCEDURE — 96361 HYDRATE IV INFUSION ADD-ON: CPT

## 2022-08-15 PROCEDURE — 84484 ASSAY OF TROPONIN QUANT: CPT

## 2022-08-15 PROCEDURE — 80053 COMPREHEN METABOLIC PANEL: CPT

## 2022-08-15 PROCEDURE — 96374 THER/PROPH/DIAG INJ IV PUSH: CPT

## 2022-08-15 PROCEDURE — 85025 COMPLETE CBC W/AUTO DIFF WBC: CPT

## 2022-08-15 PROCEDURE — 82803 BLOOD GASES ANY COMBINATION: CPT

## 2022-08-15 RX ORDER — DEXTROSE MONOHYDRATE 25 G/50ML
INJECTION, SOLUTION INTRAVENOUS
Status: COMPLETED
Start: 2022-08-15 | End: 2022-08-15

## 2022-08-15 RX ORDER — DEXTROSE MONOHYDRATE 25 G/50ML
50 INJECTION, SOLUTION INTRAVENOUS ONCE
Status: COMPLETED | OUTPATIENT
Start: 2022-08-15 | End: 2022-08-15

## 2022-08-15 RX ORDER — DEXTROSE MONOHYDRATE 25 G/50ML
50 INJECTION, SOLUTION INTRAVENOUS ONCE
Status: CANCELLED | OUTPATIENT
Start: 2022-08-15 | End: 2022-08-15

## 2022-08-15 RX ORDER — DEXTROSE MONOHYDRATE 25 G/50ML
25 INJECTION, SOLUTION INTRAVENOUS ONCE
Status: CANCELLED | OUTPATIENT
Start: 2022-08-15 | End: 2022-08-15

## 2022-08-15 RX ORDER — DEXTROSE AND SODIUM CHLORIDE 5; .9 G/100ML; G/100ML
200 INJECTION, SOLUTION INTRAVENOUS CONTINUOUS
Status: DISCONTINUED | OUTPATIENT
Start: 2022-08-15 | End: 2022-08-16

## 2022-08-15 RX ADMIN — DEXTROSE AND SODIUM CHLORIDE 125 ML/HR: 5; .9 INJECTION, SOLUTION INTRAVENOUS at 22:26

## 2022-08-15 RX ADMIN — DEXTROSE MONOHYDRATE 50 ML: 25 INJECTION, SOLUTION INTRAVENOUS at 22:53

## 2022-08-16 ENCOUNTER — TELEPHONE (OUTPATIENT)
Dept: RADIOLOGY | Facility: HOSPITAL | Age: 77
End: 2022-08-16

## 2022-08-16 ENCOUNTER — APPOINTMENT (INPATIENT)
Dept: RADIOLOGY | Facility: HOSPITAL | Age: 77
DRG: 637 | End: 2022-08-16
Payer: COMMERCIAL

## 2022-08-16 VITALS
OXYGEN SATURATION: 93 % | RESPIRATION RATE: 17 BRPM | BODY MASS INDEX: 29.86 KG/M2 | HEIGHT: 72 IN | SYSTOLIC BLOOD PRESSURE: 154 MMHG | TEMPERATURE: 97.9 F | DIASTOLIC BLOOD PRESSURE: 75 MMHG | HEART RATE: 84 BPM | WEIGHT: 220.46 LBS

## 2022-08-16 PROBLEM — T68.XXXA HYPOTHERMIA: Status: ACTIVE | Noted: 2022-08-16

## 2022-08-16 LAB
2HR DELTA HS TROPONIN: -1 NG/L
ANION GAP SERPL CALCULATED.3IONS-SCNC: 5 MMOL/L (ref 4–13)
ATRIAL RATE: 69 BPM
BUN SERPL-MCNC: 37 MG/DL (ref 5–25)
CALCIUM SERPL-MCNC: 7.5 MG/DL (ref 8.3–10.1)
CARDIAC TROPONIN I PNL SERPL HS: 12 NG/L
CHLORIDE SERPL-SCNC: 105 MMOL/L (ref 96–108)
CO2 SERPL-SCNC: 27 MMOL/L (ref 21–32)
CORTIS AM PEAK SERPL-MCNC: 21.5 UG/DL (ref 4.2–22.4)
CREAT SERPL-MCNC: 1.48 MG/DL (ref 0.6–1.3)
ERYTHROCYTE [DISTWIDTH] IN BLOOD BY AUTOMATED COUNT: 16.6 % (ref 11.6–15.1)
FOLATE SERPL-MCNC: 9.1 NG/ML (ref 3.1–17.5)
GFR SERPL CREATININE-BSD FRML MDRD: 45 ML/MIN/1.73SQ M
GLUCOSE SERPL-MCNC: 196 MG/DL (ref 65–140)
GLUCOSE SERPL-MCNC: 198 MG/DL (ref 65–140)
GLUCOSE SERPL-MCNC: 297 MG/DL (ref 65–140)
GLUCOSE SERPL-MCNC: 330 MG/DL (ref 65–140)
GLUCOSE SERPL-MCNC: 393 MG/DL (ref 65–140)
GLUCOSE SERPL-MCNC: 69 MG/DL (ref 65–140)
GLUCOSE SERPL-MCNC: 74 MG/DL (ref 65–140)
GLUCOSE SERPL-MCNC: 97 MG/DL (ref 65–140)
HCT VFR BLD AUTO: 37.2 % (ref 36.5–49.3)
HGB BLD-MCNC: 11.7 G/DL (ref 12–17)
LACTATE SERPL-SCNC: 1.8 MMOL/L (ref 0.5–2)
MCH RBC QN AUTO: 27.1 PG (ref 26.8–34.3)
MCHC RBC AUTO-ENTMCNC: 31.5 G/DL (ref 31.4–37.4)
MCV RBC AUTO: 86 FL (ref 82–98)
PLATELET # BLD AUTO: 119 THOUSANDS/UL (ref 149–390)
PMV BLD AUTO: 10.7 FL (ref 8.9–12.7)
POTASSIUM SERPL-SCNC: 3.8 MMOL/L (ref 3.5–5.3)
QRS AXIS: 211 DEGREES
QRSD INTERVAL: 154 MS
QT INTERVAL: 448 MS
QTC INTERVAL: 476 MS
RBC # BLD AUTO: 4.32 MILLION/UL (ref 3.88–5.62)
SODIUM SERPL-SCNC: 137 MMOL/L (ref 135–147)
T WAVE AXIS: 6 DEGREES
TSH SERPL DL<=0.05 MIU/L-ACNC: 0.54 UIU/ML (ref 0.45–4.5)
VENTRICULAR RATE: 68 BPM
VIT B12 SERPL-MCNC: 246 PG/ML (ref 100–900)
WBC # BLD AUTO: 11.45 THOUSAND/UL (ref 4.31–10.16)

## 2022-08-16 PROCEDURE — 80048 BASIC METABOLIC PNL TOTAL CA: CPT | Performed by: INTERNAL MEDICINE

## 2022-08-16 PROCEDURE — 82948 REAGENT STRIP/BLOOD GLUCOSE: CPT

## 2022-08-16 PROCEDURE — 99239 HOSP IP/OBS DSCHRG MGMT >30: CPT | Performed by: INTERNAL MEDICINE

## 2022-08-16 PROCEDURE — 99222 1ST HOSP IP/OBS MODERATE 55: CPT | Performed by: INTERNAL MEDICINE

## 2022-08-16 PROCEDURE — 83605 ASSAY OF LACTIC ACID: CPT

## 2022-08-16 PROCEDURE — 99223 1ST HOSP IP/OBS HIGH 75: CPT | Performed by: INTERNAL MEDICINE

## 2022-08-16 PROCEDURE — 85027 COMPLETE CBC AUTOMATED: CPT | Performed by: INTERNAL MEDICINE

## 2022-08-16 PROCEDURE — 84443 ASSAY THYROID STIM HORMONE: CPT | Performed by: INTERNAL MEDICINE

## 2022-08-16 PROCEDURE — 82533 TOTAL CORTISOL: CPT | Performed by: INTERNAL MEDICINE

## 2022-08-16 PROCEDURE — 94640 AIRWAY INHALATION TREATMENT: CPT

## 2022-08-16 PROCEDURE — 74177 CT ABD & PELVIS W/CONTRAST: CPT

## 2022-08-16 PROCEDURE — 96361 HYDRATE IV INFUSION ADD-ON: CPT

## 2022-08-16 PROCEDURE — 82746 ASSAY OF FOLIC ACID SERUM: CPT | Performed by: INTERNAL MEDICINE

## 2022-08-16 PROCEDURE — 84484 ASSAY OF TROPONIN QUANT: CPT

## 2022-08-16 PROCEDURE — 82607 VITAMIN B-12: CPT | Performed by: INTERNAL MEDICINE

## 2022-08-16 PROCEDURE — 94760 N-INVAS EAR/PLS OXIMETRY 1: CPT

## 2022-08-16 PROCEDURE — 93010 ELECTROCARDIOGRAM REPORT: CPT | Performed by: INTERNAL MEDICINE

## 2022-08-16 PROCEDURE — 36415 COLL VENOUS BLD VENIPUNCTURE: CPT

## 2022-08-16 PROCEDURE — G1004 CDSM NDSC: HCPCS

## 2022-08-16 RX ORDER — INSULIN ASPART 100 [IU]/ML
45 INJECTION, SUSPENSION SUBCUTANEOUS EVERY 8 HOURS
Qty: 40 ML | Refills: 0 | Status: SHIPPED | OUTPATIENT
Start: 2022-08-16 | End: 2022-08-16

## 2022-08-16 RX ORDER — FERROUS SULFATE 325(65) MG
325 TABLET ORAL
Status: DISCONTINUED | OUTPATIENT
Start: 2022-08-16 | End: 2022-08-16 | Stop reason: HOSPADM

## 2022-08-16 RX ORDER — INSULIN LISPRO 100 [IU]/ML
1-6 INJECTION, SOLUTION INTRAVENOUS; SUBCUTANEOUS EVERY 6 HOURS SCHEDULED
Status: DISCONTINUED | OUTPATIENT
Start: 2022-08-16 | End: 2022-08-16

## 2022-08-16 RX ORDER — ALBUTEROL SULFATE 2.5 MG/3ML
2.5 SOLUTION RESPIRATORY (INHALATION) EVERY 4 HOURS PRN
Status: DISCONTINUED | OUTPATIENT
Start: 2022-08-16 | End: 2022-08-16 | Stop reason: HOSPADM

## 2022-08-16 RX ORDER — CARVEDILOL 6.25 MG/1
6.25 TABLET ORAL 2 TIMES DAILY WITH MEALS
Status: DISCONTINUED | OUTPATIENT
Start: 2022-08-16 | End: 2022-08-16 | Stop reason: HOSPADM

## 2022-08-16 RX ORDER — GABAPENTIN 100 MG/1
100 CAPSULE ORAL 3 TIMES DAILY
Status: DISCONTINUED | OUTPATIENT
Start: 2022-08-16 | End: 2022-08-16 | Stop reason: HOSPADM

## 2022-08-16 RX ORDER — FUROSEMIDE 40 MG/1
40 TABLET ORAL DAILY
Status: DISCONTINUED | OUTPATIENT
Start: 2022-08-16 | End: 2022-08-16 | Stop reason: HOSPADM

## 2022-08-16 RX ORDER — PRAVASTATIN SODIUM 80 MG/1
80 TABLET ORAL
Refills: 5 | Status: DISCONTINUED | OUTPATIENT
Start: 2022-08-16 | End: 2022-08-16 | Stop reason: HOSPADM

## 2022-08-16 RX ORDER — INSULIN LISPRO PROTAMIN/LISPRO 75-25/ML
45 VIAL (ML) SUBCUTANEOUS 3 TIMES DAILY
Qty: 40 ML | Refills: 0 | OUTPATIENT
Start: 2022-08-16 | End: 2022-08-22

## 2022-08-16 RX ORDER — CYANOCOBALAMIN 1000 UG/ML
1000 INJECTION, SOLUTION INTRAMUSCULAR; SUBCUTANEOUS
Qty: 1 ML | Refills: 0
Start: 2022-08-16 | End: 2022-08-22

## 2022-08-16 RX ORDER — HEPARIN SODIUM 5000 [USP'U]/ML
5000 INJECTION, SOLUTION INTRAVENOUS; SUBCUTANEOUS EVERY 8 HOURS SCHEDULED
Status: DISCONTINUED | OUTPATIENT
Start: 2022-08-16 | End: 2022-08-16 | Stop reason: HOSPADM

## 2022-08-16 RX ORDER — ASPIRIN 81 MG/1
81 TABLET ORAL DAILY
Status: DISCONTINUED | OUTPATIENT
Start: 2022-08-16 | End: 2022-08-16 | Stop reason: HOSPADM

## 2022-08-16 RX ORDER — ONDANSETRON 2 MG/ML
4 INJECTION INTRAMUSCULAR; INTRAVENOUS EVERY 6 HOURS PRN
Status: DISCONTINUED | OUTPATIENT
Start: 2022-08-16 | End: 2022-08-16 | Stop reason: HOSPADM

## 2022-08-16 RX ORDER — LEVALBUTEROL 1.25 MG/.5ML
1.25 SOLUTION, CONCENTRATE RESPIRATORY (INHALATION)
Status: DISCONTINUED | OUTPATIENT
Start: 2022-08-16 | End: 2022-08-16 | Stop reason: HOSPADM

## 2022-08-16 RX ORDER — INSULIN ASPART 100 [IU]/ML
45 INJECTION, SUSPENSION SUBCUTANEOUS EVERY 8 HOURS
Status: DISCONTINUED | OUTPATIENT
Start: 2022-08-16 | End: 2022-08-16 | Stop reason: HOSPADM

## 2022-08-16 RX ORDER — OXYCODONE HYDROCHLORIDE 5 MG/1
7.5 TABLET ORAL 2 TIMES DAILY PRN
Refills: 0 | Status: DISCONTINUED | OUTPATIENT
Start: 2022-08-16 | End: 2022-08-16 | Stop reason: HOSPADM

## 2022-08-16 RX ORDER — LISINOPRIL 10 MG/1
10 TABLET ORAL DAILY
Status: DISCONTINUED | OUTPATIENT
Start: 2022-08-16 | End: 2022-08-16 | Stop reason: HOSPADM

## 2022-08-16 RX ORDER — INSULIN LISPRO 100 [IU]/ML
1-5 INJECTION, SOLUTION INTRAVENOUS; SUBCUTANEOUS
Status: DISCONTINUED | OUTPATIENT
Start: 2022-08-16 | End: 2022-08-16 | Stop reason: HOSPADM

## 2022-08-16 RX ORDER — INSULIN LISPRO 100 [IU]/ML
1-6 INJECTION, SOLUTION INTRAVENOUS; SUBCUTANEOUS
Status: DISCONTINUED | OUTPATIENT
Start: 2022-08-16 | End: 2022-08-16 | Stop reason: HOSPADM

## 2022-08-16 RX ORDER — IPRATROPIUM BROMIDE AND ALBUTEROL SULFATE 2.5; .5 MG/3ML; MG/3ML
3 SOLUTION RESPIRATORY (INHALATION) 3 TIMES DAILY
Status: DISCONTINUED | OUTPATIENT
Start: 2022-08-16 | End: 2022-08-16

## 2022-08-16 RX ORDER — CYANOCOBALAMIN 1000 UG/ML
1000 INJECTION, SOLUTION INTRAMUSCULAR; SUBCUTANEOUS DAILY
Status: DISCONTINUED | OUTPATIENT
Start: 2022-08-16 | End: 2022-08-16 | Stop reason: HOSPADM

## 2022-08-16 RX ORDER — TAMSULOSIN HYDROCHLORIDE 0.4 MG/1
0.4 CAPSULE ORAL
Status: DISCONTINUED | OUTPATIENT
Start: 2022-08-16 | End: 2022-08-16 | Stop reason: HOSPADM

## 2022-08-16 RX ADMIN — INSULIN ASPART 45 UNITS: 100 INJECTION, SUSPENSION SUBCUTANEOUS at 15:05

## 2022-08-16 RX ADMIN — LEVALBUTEROL HYDROCHLORIDE 1.25 MG: 1.25 SOLUTION, CONCENTRATE RESPIRATORY (INHALATION) at 08:59

## 2022-08-16 RX ADMIN — SODIUM CHLORIDE 75 ML/HR: 234 INJECTION, SOLUTION INTRAVENOUS at 01:41

## 2022-08-16 RX ADMIN — TAMSULOSIN HYDROCHLORIDE 0.4 MG: 0.4 CAPSULE ORAL at 17:06

## 2022-08-16 RX ADMIN — CARVEDILOL 6.25 MG: 6.25 TABLET, FILM COATED ORAL at 10:00

## 2022-08-16 RX ADMIN — IPRATROPIUM BROMIDE 0.5 MG: 0.5 SOLUTION RESPIRATORY (INHALATION) at 08:59

## 2022-08-16 RX ADMIN — GABAPENTIN 100 MG: 100 CAPSULE ORAL at 15:05

## 2022-08-16 RX ADMIN — FERROUS SULFATE TAB 325 MG (65 MG ELEMENTAL FE) 325 MG: 325 (65 FE) TAB at 10:00

## 2022-08-16 RX ADMIN — ASPIRIN 81 MG: 81 TABLET, COATED ORAL at 10:00

## 2022-08-16 RX ADMIN — PRAVASTATIN SODIUM 80 MG: 80 TABLET ORAL at 17:06

## 2022-08-16 RX ADMIN — INSULIN LISPRO 4 UNITS: 100 INJECTION, SOLUTION INTRAVENOUS; SUBCUTANEOUS at 17:06

## 2022-08-16 RX ADMIN — IPRATROPIUM BROMIDE 0.5 MG: 0.5 SOLUTION RESPIRATORY (INHALATION) at 13:50

## 2022-08-16 RX ADMIN — FUROSEMIDE 40 MG: 40 TABLET ORAL at 10:00

## 2022-08-16 RX ADMIN — IOHEXOL 85 ML: 350 INJECTION, SOLUTION INTRAVENOUS at 04:49

## 2022-08-16 RX ADMIN — HEPARIN SODIUM 5000 UNITS: 5000 INJECTION INTRAVENOUS; SUBCUTANEOUS at 05:29

## 2022-08-16 RX ADMIN — CEFEPIME HYDROCHLORIDE 2000 MG: 2 INJECTION, POWDER, FOR SOLUTION INTRAVENOUS at 01:37

## 2022-08-16 RX ADMIN — LEVALBUTEROL HYDROCHLORIDE 1.25 MG: 1.25 SOLUTION, CONCENTRATE RESPIRATORY (INHALATION) at 13:50

## 2022-08-16 RX ADMIN — LISINOPRIL 10 MG: 10 TABLET ORAL at 10:00

## 2022-08-16 RX ADMIN — GABAPENTIN 100 MG: 100 CAPSULE ORAL at 10:00

## 2022-08-16 RX ADMIN — INSULIN LISPRO 6 UNITS: 100 INJECTION, SOLUTION INTRAVENOUS; SUBCUTANEOUS at 11:49

## 2022-08-16 RX ADMIN — CYANOCOBALAMIN 1000 MCG: 1000 INJECTION, SOLUTION INTRAMUSCULAR at 12:18

## 2022-08-16 RX ADMIN — CARVEDILOL 6.25 MG: 6.25 TABLET, FILM COATED ORAL at 17:06

## 2022-08-16 RX ADMIN — HEPARIN SODIUM 5000 UNITS: 5000 INJECTION INTRAVENOUS; SUBCUTANEOUS at 13:41

## 2022-08-16 NOTE — DISCHARGE SUMMARY
[address] [facility]  Discharge- Michlele Singleton Sr  1945, 78 y o  male MRN: [id number]  Unit/Bed#: [facility] 788-37 Encounter: [id number]  Primary Care Provider: Dipika Pham MD   Date and time admitted to hospital: 8/15/2022  9:56 PM    * Type 2 diabetes mellitus with hypoglycemia without coma, with long-term current use of insulin [facility])  Assessment & Plan  Lab Results   Component Value Date    HGBA1C 10 3 (H) 07/06/2022       Recent Labs     08/16/22  0528 08/16/22  1142 08/16/22  1504 08/16/22  1555   POCGLU 196* 393* 330* 297*       Blood Sugar Average: Last 72 hrs:  (P) 174 4   · Presenting after he was found down at home by son, noted apparently blood glucose 23 by EMS  Patient with history of uncontrolled type 2 diabetes on intensive insulin therapy  · Hypoglycemia resolved  · D/w endocrinology, will changed to mixed insulin  · Novolog 70/30 not covered by his insurance, changed to humalog 75/25    COPD, severe ([address] )  Assessment & Plan  · Not in acute exacerbation, continue home inhalers     Hypothermia  Assessment & Plan  · Hypothermic on ED arrival requiring Serenity Hugger  · Suspect in setting of hypoglycemia  · Serum cortisol is WNL    CKD (chronic kidney disease) stage 3, GFR 30-59 ml/min [facility])  Assessment & Plan  Lab Results   Component Value Date    EGFR 45 08/16/2022    EGFR 40 08/15/2022    EGFR 41 07/06/2022    CREATININE 1 48 (H) 08/16/2022    CREATININE 1 63 (H) 08/15/2022    CREATININE 1 60 (H) 07/06/2022   · Creatinine near recent baseline, monitor with BMP    Acute metabolic encephalopathy  Assessment & Plan  · Marked encephalopathy, currently suspect in setting of hypoglycemia    Only groans and localizes to voice, only intermittently follows commands  · Remainder of labs without marked abnormality compared to prior, CT head negative for acute intracranial pathology  · Resolved  · B12 found to be low, will start supplementation with monthly injections    Essential hypertension  Assessment & Plan  · Holding home antihypertensives given marked acute encephalopathy, resume once more alert and able to tolerate p o   · IV p r n  antihypertensive if patient with marked hypertension        Medical Problems             Resolved Problems  Date Reviewed: 8/16/2022   None               Discharging Physician / Practitioner: Murphy Camacho MD  PCP: Jean oRa MD  Admission Date:   Admission Orders (From admission, onward)     Ordered        08/16/22 0116  INPATIENT ADMISSION  Once            08/16/22 0117  Inpatient Admission  Once                      Discharge Date: 08/16/22    Consultations During Hospital Stay:  · endocrinology    Procedures Performed:   · none    Significant Findings / Test Results:   Ct abdomen: Focal left upper lobe small bowel ileus  No evidence for small bowel obstruction  No transition point  The remainder of the visualized bowel is unremarkable    Unchanged right lower lobe findings as above  CXR: Pleural-parenchymal density at the right lung base has mildly worsened as compared to the studies of June and April  Increased effusion and/or new pneumonia could be present superimposed on more chronic changes noted previously  Follow-up is advised  Shaun Barrera is continued volume loss in the right hemithorax    The left lung appears clear  Incidental Findings:   · None     Test Results Pending at Discharge (will require follow up):   · none     Outpatient Tests Requested:  · None    Complications:  None    Reason for Admission: AMS hypoglycemia    Hospital Course:   Alyssa Piña Sr  is a 78 y o  male patient who originally presented to the hospital on 8/15/2022 due to AMs and hypoglycmia  He was given IV dextrose until his insulin effects wore off  He was seen by endocrinology and recommended to stop u500 insulin, this was changed to mixed insulin upon discharge  Mental status returned to baseline   B12 was found to be low and he was given a B12 injection  Please see above list of diagnoses and related plan for additional information  Condition at Discharge: stable    Discharge Day Visit / Exam:   Subjective:  Denies any acute complaints  Vitals: Blood Pressure: 153/75 (08/16/22 1533)  Pulse: 84 (08/16/22 1001)  Temperature: 97 9 °F (36 6 °C) (08/16/22 0545)  Temp Source: Oral (08/16/22 0545)  Respirations: 17 (08/16/22 1533)  Height: 6' (182 9 cm) (08/16/22 0745)  Weight - Scale: 100 kg (220 lb 7 4 oz) (08/16/22 0745)  SpO2: 93 % (08/16/22 1350)  Exam:   Physical Exam  Constitutional:       Appearance: Normal appearance  HENT:      Head: Normocephalic and atraumatic  Mouth/Throat:      Mouth: Mucous membranes are moist       Pharynx: Oropharynx is clear  Eyes:      Extraocular Movements: Extraocular movements intact  Cardiovascular:      Rate and Rhythm: Normal rate and regular rhythm  Pulmonary:      Effort: Pulmonary effort is normal       Breath sounds: No wheezing or rales  Abdominal:      Palpations: Abdomen is soft  Neurological:      General: No focal deficit present  Mental Status: He is alert and oriented to person, place, and time  Psychiatric:         Mood and Affect: Mood normal          Behavior: Behavior normal           Discussion with Family: Patient declined call to   Discharge instructions/Information to patient and family:   See after visit summary for information provided to patient and family  Provisions for Follow-Up Care:  See after visit summary for information related to follow-up care and any pertinent home health orders  Disposition:   Home    Planned Readmission: No     Discharge Statement:  I spent 45 minutes discharging the patient  This time was spent on the day of discharge  I had direct contact with the patient on the day of discharge   Greater than 50% of the total time was spent examining patient, answering all patient questions, arranging and discussing plan of care with patient as well as directly providing post-discharge instructions  Additional time then spent on discharge activities  Discharge Medications:  See after visit summary for reconciled discharge medications provided to patient and/or family        **Please Note: This note may have been constructed using a voice recognition system**

## 2022-08-16 NOTE — ASSESSMENT & PLAN NOTE
· Hypothermic on ED arrival requiring Serenity Hugger  · Suspect in setting of hypoglycemia  · Serum cortisol is WNL

## 2022-08-16 NOTE — H&P
[address] [facility]  H&P- Antonia Brantley Sr  1945, 78 y o  male MRN: [id number]  Unit/Bed#: ED 20 Encounter: [id number]  Primary Care Provider: Ceferino Marsh MD   Date and time admitted to hospital: 8/15/2022  9:56 PM    * Type 2 diabetes mellitus with hypoglycemia without coma, with long-term current use of insulin [facility])  Assessment & Plan  Lab Results   Component Value Date    HGBA1C 10 3 (H) 07/06/2022       Recent Labs     08/15/22  2247 08/15/22  2326 08/16/22  0048 08/16/22  0112   POCGLU 58* 147* 74 69       Blood Sugar Average: Last 72 hrs:  (P) 86 2   · Presenting after he was found down at home by son, noted apparently blood glucose 23 by EMS  Patient with history of uncontrolled type 2 diabetes on intensive insulin therapy  · Remains with low blood sugars despite D50 then D5 GTT, additionally remaining with encephalopathy  · For now transitioned to D10 GTT, monitor blood glucose every 2 hours  · Hold insulin for now  · Initiate hypoglycemia protocol  · Will attempt to gain further history regarding circumstance from patient once his encephalopathy resolves/son once he can be reached; ? If patient with poor appetite or if he took extra insulin    Acute metabolic encephalopathy  Assessment & Plan  · Marked encephalopathy, currently suspect in setting of hypoglycemia    Only groans and localizes to voice, only intermittently follows commands  · Remainder of labs without marked abnormality compared to prior, CT head negative for acute intracranial pathology  · Patient does groan during abdominal exam, will obtain CT abdomen/pelvis  · Check TSH/B12/folate  · Close monitoring of mental status, NPO for now pending improvement in mental status    Hypothermia  Assessment & Plan  · Hypothermic on ED arrival requiring Serenity Hugger  · Suspect in setting of hypoglycemia however will check TSH and a m  cortisol to rule out contributing factors  · Continue Serenity Hugger until temperature improved    CKD (chronic kidney disease) stage 3, GFR 30-59 ml/min [facility])  Assessment & Plan  Lab Results   Component Value Date    EGFR 40 08/15/2022    EGFR 41 07/06/2022    EGFR 37 07/05/2022    CREATININE 1 63 (H) 08/15/2022    CREATININE 1 60 (H) 07/06/2022    CREATININE 1 74 (H) 07/05/2022   · Creatinine near recent baseline, monitor with BMP    Essential hypertension  Assessment & Plan  · Holding home antihypertensives given marked acute encephalopathy, resume once more alert and able to tolerate p o   · IV p r n  antihypertensive if patient with marked hypertension    COPD, severe ([address] )  Assessment & Plan  · Not in acute exacerbation, continue home inhalers once patient more alert        VTE Prophylaxis: Heparin  / sequential compression device   Code Status:  Full code but will readdress once able to reach son/once patient's mental status improves  POLST: POLST form is not discussed and not completed at this time  Discussion with family:  Attempted to reach son Yue Page via telephone but unable to reach    Anticipated Length of Stay:  Patient will be admitted on an Inpatient basis with an anticipated length of stay of  greater than 2 midnights  Justification for Hospital Stay: Please see detailed plans noted above  Chief Complaint:     Altered mental status and hypoglycemia  History of Present Illness:  Venu Torres  is a 78 y o  male who presented from home with altered mental status/unresponsiveness as found by son at home  He has a past medical history significant for type 2 diabetes on intensive insulin therapy, COPD, chronic diastolic heart failure, chronic respiratory failure requiring 2 L NC continuously, hypertension, chronic kidney disease stage 3; notably has had multiple admissions at this hospital this calendar year for exacerbations of his multiple comorbidities    History is obtained from chart review and discussion with ED physician as patient with marked altered mental status and does not participate in history and I am unable to reach son at this time for further clarification  Apparently patient was found this evening at home unresponsive by son, prompting call to EMS  On EMS arrival blood sugars taken found to be 23 apparently for which patient received 25 g D50 with increasing glucose to 212 however without improvement in mental status thus patient brought here for evaluation  On initial ED arrival he apparently was spontaneously looking around room and following some commands but not speaking  Patient further drop in blood glucose requiring further D50 then D5 GTT but still with blood glucose drop  Further workup was without marked lab abnormalities save for lactic acidosis and CT head was negative for acute intracranial pathology  Given his ongoing hypoglycemia and altered mental status he is admitted for further management  Currently, patient is lying in bed, responding localizing to voice but does not open eyes for me and also only intermittently follows commands  He responds only in groans thus further history cannot be obtained from patient at this time      Review of Systems:  Unable to perform ROS:  Mental status change    Past Medical and Surgical History:   Past Medical History:   Diagnosis Date    Abdominal pain     MARANDA (acute kidney injury) ([address] ) 6/19/2018    Cardiac disease     [facility]est[facility]     COPD, severe (HCC)     Coronary artery disease     DDD (degenerative disc disease), lumbar 9/1/2020    Diabetes mellitus ([address] )     Dyspnea     GERD (gastroesophageal reflux disease)     Hyperlipidemia     Hypertension     MI (myocardial infarction) ([address] )     with 3 stents    Nodule of apex of right lung     JACQUELINE (obstructive sleep apnea)     Prostate cancer (HCC)      Past Surgical History:   Procedure Laterality Date    ABDOMINAL SURGERY      exploratory    ANGIOPLASTY      3 stents    APPENDECTOMY      COLONOSCOPY  2015    CT NEEDLE BIOPSY LUNG  11/3/2020    ESOPHAGOGASTRODUODENOSCOPY N/A 10/2/2017    Procedure: ESOPHAGOGASTRODUODENOSCOPY (EGD); Surgeon: Kaitlynn Baer MD;  Location: [facility]; Service: Gastroenterology    IR THORACENTESIS  11/3/2020    KNEE CARTILAGE SURGERY      OTHER SURGICAL HISTORY      stent placement    PROSTATE SURGERY      SKIN GRAFT      Basal cell CA back       Meds/Allergies:    Current Facility-Administered Medications:     dextrose 10 % and normal saline infusion, 75 mL/hr, Intravenous, Continuous, Jaden Kemp DO, Last Rate: 75 mL/hr at 08/16/22 0141, 75 mL/hr at 08/16/22 0141    Current Outpatient Medications:     acetaminophen (TYLENOL) 500 mg tablet, Take 2 tablets (1,000 mg total) by mouth every 8 (eight) hours (Patient not taking: Reported on 7/5/2022), Disp: 180 tablet, Rfl: 0    aspirin (ECOTRIN LOW STRENGTH) 81 mg EC tablet, Take 1 tablet (81 mg total) by mouth daily, Disp: 30 tablet, Rfl: 2    BD Insulin Syringe U-500 31G X 6MM 0 5 ML MISC, USE 1 SYRINGE THREE TIMES A DAY FOR INJECTING INSULIN, Disp: 100 each, Rfl: 5    carvedilol (COREG) 6 25 mg tablet, Take 1 tablet (6 25 mg total) by mouth 2 (two) times a day with meals, Disp: 180 tablet, Rfl: 0    cyclobenzaprine (FLEXERIL) 10 mg tablet, Take 1 tablet (10 mg total) by mouth in the morning and 1 tablet (10 mg total) before bedtime  , Disp: 60 tablet, Rfl: 5    ferrous sulfate 325 (65 Fe) mg tablet, Take 1 tablet (325 mg total) by mouth daily with breakfast, Disp: 30 tablet, Rfl: 5    fluticasone-umeclidinium-vilanterol (Trelegy Ellipta) 100-62 5-25 MCG/INH inhaler, Inhale 1 puff  in the morning  Rinse mouth after use   , Disp: 1 each, Rfl: 5    furosemide (LASIX) 40 mg tablet, Take 1 tablet (40 mg total) by mouth daily, Disp: 90 tablet, Rfl: 0    gabapentin (NEURONTIN) 100 mg capsule, Take 1 capsule (100 mg total) by mouth 3 (three) times a day, Disp: 90 capsule, Rfl: 0    glucose blood (OneTouch Verio) test strip, May substitute brand based on insurance coverage  Check glucose QID , Disp: 100 each, Rfl: 0    insulin regular (HUMULIN R) 500 units/mL CONCENTRATED injection, Inject 0 09 mL (45 Units total) under the skin in the morning and 0 09 mL (45 Units total) at noon and 0 09 mL (45 Units total) in the evening  Inject before meals  , Disp: 40 mL, Rfl: 5    ipratropium-albuterol (DUO-NEB) 0 5-2 5 mg/3 mL nebulizer solution, Take 3 mL by nebulization 3 (three) times a day, Disp: 270 mL, Rfl: 0    lidocaine (XYLOCAINE) 5 % ointment, Apply topically as needed for mild pain, Disp: 35 44 g, Rfl: 0    lisinopril (ZESTRIL) 10 mg tablet, Take 1 tablet (10 mg total) by mouth daily, Disp: 90 tablet, Rfl: 0    oxyCODONE 7 5 MG TABS, Take 7 5 mg by mouth 2 (two) times a day as needed for severe pain Max Daily Amount: 15 mg, Disp: 10 tablet, Rfl: 0    potassium chloride (K-DUR,KLOR-CON) 20 mEq tablet, Take 1 tablet (20 mEq total) by mouth in the morning , Disp: 17 tablet, Rfl: 0    simvastatin (ZOCOR) 40 mg tablet, Take 1 tablet (40 mg total) by mouth in the morning , Disp: 30 tablet, Rfl: 5    tamsulosin (FLOMAX) 0 4 mg, Take 1 capsule (0 4 mg total) by mouth daily with dinner, Disp: 30 capsule, Rfl: 0    Allergies: Allergies   Allergen Reactions    Crestor [Rosuvastatin] Other (See Comments)     Unknown      Metformin GI Intolerance     History:  Marital Status:       Substance Use History:   Social History     Substance and Sexual Activity   Alcohol Use Never     Social History     Tobacco Use   Smoking Status Former Smoker    Packs/day: 1 50    Years: 50 00    Pack years: 75 00    Start date: 38    Quit date: 2020    Years since quittin 0   Smokeless Tobacco Never Used     Social History     Substance and Sexual Activity   Drug Use No       Family History:  Family History   Problem Relation Age of Onset    Heart disease Father     Other Father         Mesothelioma        Physical Exam:     Vitals: Blood Pressure: 128/69 (08/16/22 0200)  Pulse: 70 (08/16/22 0200)  Temperature: (!) 95 °F (35 °C) (08/16/22 0145)  Temp Source: Bladder (08/16/22 0145)  Respirations: 18 (08/16/22 0145)  SpO2: 93 % (08/16/22 0200)    Constitutional:  Well developed, well nourished, no acute distress, non-toxic but disheveled appearance   Eyes:  PERRL, conjunctiva normal   HENT:  Atraumatic, external ears normal, nose normal, oropharynx moist, no pharyngeal exudates  Neck- normal range of motion, no tenderness, supple   Respiratory:  No respiratory distress, normal breath sounds, no rales, no wheezing   Cardiovascular:  Normal rate, normal rhythm, no murmurs, no gallops, no rubs   GI:  Soft, nondistended, normal bowel sounds, diffuse discomfort to palpation, no organomegaly, no mass, no rebound, no guarding   :  No costovertebral angle tenderness   Musculoskeletal:  No edema, no tenderness, no deformities  Back- no tenderness  Integument:  Well hydrated, no rash   Lymphatic:  No lymphadenopathy noted   Neurologic:  Somnolent, turns head and localizes to voice but does not spontaneously open eyes, answers only in groans, only intermittently follows commands, CN 2-12 normal, MAEx4  Psychiatric:  Unable to assess secondary to mental status change, groans only to answers      Lab Results: I have personally reviewed pertinent reports        Results from last 7 days   Lab Units 08/15/22  2203   WBC Thousand/uL 8 87   HEMOGLOBIN g/dL 14 2   HEMATOCRIT % 45 2   PLATELETS Thousands/uL 141*   NEUTROS PCT % 86*   LYMPHS PCT % 7*   MONOS PCT % 6   EOS PCT % 0     Results from last 7 days   Lab Units 08/15/22  2203 08/15/22  2201   POTASSIUM mmol/L 3 7  --    CHLORIDE mmol/L 106  --    CO2 mmol/L 31  --    CO2, I-STAT mmol/L  --  35*   BUN mg/dL 38*  --    CREATININE mg/dL 1 63*  --    CALCIUM mg/dL 8 2*  --    ALK PHOS U/L 88  --    ALT U/L 18  --    AST U/L 21  --    GLUCOSE, ISTAT mg/dl  --  71     Results from last 7 days   Lab Units 08/15/22  2203   INR  0 94       EKG:  Sinus rhythm with occasional PVCs HR 68, RBBB    Imaging: I have personally reviewed pertinent reports  CT head without contrast    Result Date: 8/16/2022  Narrative: CT BRAIN - WITHOUT CONTRAST INDICATION:   Mental status change, unknown cause fall unwitnessed altered  COMPARISON:  CT of the head on March 29, 2022  TECHNIQUE:  CT examination of the brain was performed  In addition to axial images, sagittal and coronal 2D reformatted images were created and submitted for interpretation  Radiation dose length product (DLP) for this visit:  896 99 mGy-cm   This examination, like all CT scans performed in the [facility], was performed utilizing techniques to minimize radiation dose exposure, including the use of iterative  reconstruction and automated exposure control  IMAGE QUALITY:  Diagnostic  FINDINGS: PARENCHYMA: Decreased attenuation is noted in periventricular and subcortical white matter demonstrating an appearance that is statistically most likely to represent mild microangiopathic change  No CT signs of acute infarction  No intracranial mass, mass effect or midline shift  No acute parenchymal hemorrhage  VENTRICLES AND EXTRA-AXIAL SPACES:  Normal for the patient's age  VISUALIZED ORBITS AND PARANASAL SINUSES:  Unremarkable  CALVARIUM AND EXTRACRANIAL SOFT TISSUES:  Normal      Impression: No acute intracranial hemorrhage, midline shift, or mass effect  Workstation performed: [id number]         ** Please Note: Dragon 360 Dictation voice to text software was used in the creation of this document   **

## 2022-08-16 NOTE — ED NOTES
Pt becoming more alert and increasingly agitated with lori hugger and removing it  Pt remains partially under blanket but still on gaymar  Continuous temperature monitoring continues         Ruth Vincent, BEATRICE  08/16/22 0015

## 2022-08-16 NOTE — INCIDENTAL FINDINGS
The following findings require follow up:  Radiographic finding   Finding: pleural effusion   Follow up required: appointmet with primary care physician or pulmonologist    Follow up should be done within 1 month(s)    Please notify the following clinician to assist with the follow up:   Dr Ulises Alston

## 2022-08-16 NOTE — ASSESSMENT & PLAN NOTE
Lab Results   Component Value Date    HGBA1C 10 3 (H) 07/06/2022       Recent Labs     08/15/22  2247 08/15/22  2326 08/16/22  0048 08/16/22  0112   POCGLU 58* 147* 74 69       Blood Sugar Average: Last 72 hrs:  (P) 86 2   · Presenting after he was found down at home by son, noted apparently blood glucose 23 by EMS  Patient with history of uncontrolled type 2 diabetes on intensive insulin therapy  · Remains with low blood sugars despite D50 then D5 GTT, additionally remaining with encephalopathy  · For now transitioned to D10 GTT, monitor blood glucose every 2 hours  · Hold insulin for now  · Initiate hypoglycemia protocol  · Will attempt to gain further history regarding circumstance from patient once his encephalopathy resolves/son once he can be reached; ?   If patient with poor appetite or if he took extra insulin

## 2022-08-16 NOTE — ASSESSMENT & PLAN NOTE
· Holding home antihypertensives given marked acute encephalopathy, resume once more alert and able to tolerate p o   · IV p r n  antihypertensive if patient with marked hypertension

## 2022-08-16 NOTE — ASSESSMENT & PLAN NOTE
· Hypothermic on ED arrival requiring Serenity Hugger  · Suspect in setting of hypoglycemia however will check TSH and a m  cortisol to rule out contributing factors  · Continue Serenity Hugger until temperature improved

## 2022-08-16 NOTE — CASE MANAGEMENT
Case Management Discharge Planning Note    Patient name Ziyad Peter   Location [facility] 508/[facility] 878-70 MRN [id number]  : 1945 Date 2022       Current Admission Date: 8/15/2022  Current Admission Diagnosis:Type 2 diabetes mellitus with hypoglycemia without coma, with long-term current use of insulin [facility])   Patient Active Problem List    Diagnosis Date Noted    Hypothermia 2022    Epididymitis, bilateral 06/15/2022    Acute cystitis without hematuria 06/15/2022    Acute respiratory failure with hypoxia ([facility] ) 2022    Chronic left-sided low back pain without sciatica 2022    CKD (chronic kidney disease) stage 3, GFR 30-59 ml/min ([facility] ) 2022    History of prostate cancer 2022    [facility] 20[facility]    Fracture of navicular bone of left foot 2021    Chronic respiratory failure with hypoxia ([facility] ) 04/15/2021    PAD (peripheral artery disease) ([facility] ) 2021    DDD (degenerative disc disease), lumbar 2020    Anemia, iron deficiency 2020    Lung nodule 2020    Pulmonary hypertension ([facility] ) 2019    JACQUELINE (obstructive sleep apnea) 2019    COPD with acute exacerbation ([facility] ) 2019    Oxygen dependent 2018    Acute metabolic encephalopathy     RBBB 2018    CAD (coronary artery disease) 2018    Chronic diastolic heart failure ([facility] ) 2018    Pleural effusion on right 10/31/2017    COPD, severe ([address] ) 2017    Diabetic neuropathy ([facility] ) 2017    Essential hypertension 2016    Venous stasis dermatitis of both lower extremities 11/15/2016    Type 2 diabetes mellitus with hypoglycemia without coma, with long-term current use of insulin ([facility] ) 2016    COPD exacerbation ([facility] ) 2016    HLD (hyperlipidemia) 2016      LOS (days): 0  Geometric Mean LOS (GMLOS) (days):   Days to GMLOS:     OBJECTIVE:  Risk of Unplanned Readmission Score: 63 37 Current admission status: Inpatient   Preferred Pharmacy:   [address] [place] [address] [address]  [place]  [address] [address]  Phone: [phone] Fax: [phone]    Primary Care Provider: Keren Van MD    Primary Insurance: [facility]  Secondary Insurance:     DISCHARGE DETAILS:    Discharge planning discussed with[de-identified] patient  Freedom of Choice: Yes  Comments - Freedom of Choice: discussed [facility] for jL Yeager referral made to [facility]     Contacts  Patient Contacts: Georgi White  Relationship to Patient[de-identified] Family  Contact Method: Phone  Phone Number: [phone]  Reason/Outcome: Continuity of Care, Emergency Contact, Discharge [address]         Is the patient interested in [facility] at discharge?: Yes  Via Freddie Aguilar requested[de-identified] [address] Name[de-identified] [facility] Provider[de-identified] PCP  Home Health Services Needed[de-identified] Diabetes Management  Homebound Criteria Met[de-identified] Uses an Assist Device (i e  cane, walker, etc)  Supporting Clincal Findings[de-identified] Fatigues Easliy in Short Distances    Other Referral/Resources/Interventions Provided:  Interventions: [facility]  Referral Comments: AIDIN referral to [facility]    Treatment Team Recommendation: Home with [address] [employer]     Additional Comments: Patient lives with son who owns the home  Patient interested in finding his own place to live  Gave patient resource information about housing  Sent [employer] message to OP CM requesring follow up-this is patient's 8th admission since April 2022

## 2022-08-16 NOTE — ASSESSMENT & PLAN NOTE
Lab Results   Component Value Date    HGBA1C 10 3 (H) 07/06/2022       Recent Labs     08/16/22  0528 08/16/22  1142 08/16/22  1504 08/16/22  1555   POCGLU 196* 393* 330* 297*       Blood Sugar Average: Last 72 hrs:  (P) 174 4   · Presenting after he was found down at home by son, noted apparently blood glucose 23 by EMS    Patient with history of uncontrolled type 2 diabetes on intensive insulin therapy  · Hypoglycemia resolved  · D/w endocrinology, will changed to mixed insulin  · Novolog 70/30 not covered by his insurance, changed to humalog 75/25

## 2022-08-16 NOTE — CASE MANAGEMENT
Case Management Discharge Planning Note    Patient name Pablito Booth   [facility] [id number]  : 1945 Date 2022       Current Admission Date: 8/15/2022  Current Admission Diagnosis:Type 2 diabetes mellitus with hypoglycemia without coma, with long-term current use of insulin [facility])   Patient Active Problem List    Diagnosis Date Noted    Hypothermia 2022    Epididymitis, bilateral 06/15/2022    Acute cystitis without hematuria 06/15/2022    Acute respiratory failure with hypoxia ([facility] ) 2022    Chronic left-sided low back pain without sciatica 2022    CKD (chronic kidney disease) stage 3, GFR 30-59 ml/min ([facility] ) 2022    History of prostate cancer 2022    Adenocarcinoma [facility] lung ([facility] ) 2022    Fracture of navicular bone of left foot 2021    Chronic respiratory failure with hypoxia ([facility] ) 04/15/2021    PAD (peripheral artery disease) ([facility] ) 2021    DDD (degenerative disc disease), lumbar 2020    Anemia, iron deficiency 2020    Lung nodule 2020    Pulmonary hypertension ([facility] ) 2019    JACQUELINE (obstructive sleep apnea) 2019    COPD with acute exacerbation ([facility] ) 2019    Oxygen dependent 2018    Acute metabolic encephalopathy 53/10/8193    RBBB 2018    CAD (coronary artery disease) 2018    Chronic diastolic heart failure ([facility] ) 2018    Pleural effusion on right 10/31/2017    COPD, severe ([facility] ) 2017    Diabetic neuropathy ([facility] ) 2017    Essential hypertension 2016    Venous stasis dermatitis of both lower extremities 11/15/2016    Type 2 diabetes mellitus with hypoglycemia without coma, with long-term current use of insulin ([facility] ) 2016    COPD exacerbation ([facility] ) 2016    HLD (hyperlipidemia) 2016      LOS (days): 0  Geometric Mean LOS (GMLOS) (days):   Days to GMLOS:     OBJECTIVE:  Risk of Unplanned Readmission Score: 64 02 Current admission status: Inpatient   Preferred Pharmacy:   [address] [place] [address]  Phone: [phone] Fax: [phone]    Primary Care Provider: Rao Harrington MD    Primary Insurance: [facility]  Secondary Insurance:     DISCHARGE DETAILS:    Additional Comments: Michelle Alejo referral was made to [facility] and  was notified that they are not willing to accept the patient due to non compliance  With past referral the patient refused service and did not answer the door  Informed Dr Marsha Hernández RN and explained to the patient

## 2022-08-16 NOTE — ED NOTES
Pt fed and blanket removed as temperature is WNL  Pt requesting it removed several times but encouraged to keep it on as long as he could  Pt transported to CT and blanket turned off when leaving       Arla Fabry, RN  08/16/22 [id number]

## 2022-08-16 NOTE — QUICK NOTE
On reassessment patient now awake and alert, answering questions appropriately  He states that recently his blood glucose has been out of control despite use of U500 insulin regimen 55-55-35 with meals  He states that today he does not believe he took more insulin than usual   States he felt warm earlier the afternoon of presentation, thus he sat down on the couch before apparently passing out  Denies any known sick contacts, chest pain/pressure, worsening shortness of breath, dizziness/lightheadedness now, abdominal pain/nausea/vomiting/diarrhea, dysuria, or focal weakness  Patient now normothermic, and most recent blood glucose improving but still on lower side  Continue D10 GTT for now, and given report of uncontrolled blood glucose will request Endocrinology consult while inpatient  Given his marked improvement in mental status will allow for diet, resume home oral medications  Remainder of care plan as per my H&P earlier this evening

## 2022-08-16 NOTE — ED PROVIDER NOTES
History  Chief Complaint   Patient presents with    Hypoglycemia - Symptomatic     Found by son down between 10 min and 3 hrs  BBG 23 recoveredd to 212 after 25g dextrose  Pt alert yet still not responsive  Patient is a 14-year-old male presenting to the emergency department via EMS after being found down by his son unresponsive  For for EMS the patient blood sugar was 23  They gave him 25g dextrose and the glucose came up to 212  Patient was still minimally responsive for EMS but time of arrival patient was spontaneously looking around the room and following commands but was not speaking  Per EMS the patient is a known diabetic as well as some has COPD  Prior to Admission Medications   Prescriptions Last Dose Informant Patient Reported? Taking? BD Insulin Syringe U-500 31G X 6MM 0 5 ML MISC   No No   Sig: USE 1 SYRINGE THREE TIMES A DAY FOR INJECTING INSULIN   acetaminophen (TYLENOL) 500 mg tablet   No No   Sig: Take 2 tablets (1,000 mg total) by mouth every 8 (eight) hours   Patient not taking: Reported on 7/5/2022   aspirin (ECOTRIN LOW STRENGTH) 81 mg EC tablet   No No   Sig: Take 1 tablet (81 mg total) by mouth daily   carvedilol (COREG) 6 25 mg tablet   No No   Sig: Take 1 tablet (6 25 mg total) by mouth 2 (two) times a day with meals   cyclobenzaprine (FLEXERIL) 10 mg tablet   No No   Sig: Take 1 tablet (10 mg total) by mouth in the morning and 1 tablet (10 mg total) before bedtime  ferrous sulfate 325 (65 Fe) mg tablet   No No   Sig: Take 1 tablet (325 mg total) by mouth daily with breakfast   fluticasone-umeclidinium-vilanterol (Trelegy Ellipta) 100-62 5-25 MCG/INH inhaler   No No   Sig: Inhale 1 puff  in the morning  Rinse mouth after use      furosemide (LASIX) 40 mg tablet   No No   Sig: Take 1 tablet (40 mg total) by mouth daily   gabapentin (NEURONTIN) 100 mg capsule   No No   Sig: Take 1 capsule (100 mg total) by mouth 3 (three) times a day   glucose blood (OneTouch Verio) test strip   No No   Sig: May substitute brand based on insurance coverage  Check glucose QID  insulin regular (HUMULIN R) 500 units/mL CONCENTRATED injection   No No   Sig: Inject 0 09 mL (45 Units total) under the skin in the morning and 0 09 mL (45 Units total) at noon and 0 09 mL (45 Units total) in the evening  Inject before meals  ipratropium-albuterol (DUO-NEB) 0 5-2 5 mg/3 mL nebulizer solution   No No   Sig: Take 3 mL by nebulization 3 (three) times a day   lidocaine (XYLOCAINE) 5 % ointment   No No   Sig: Apply topically as needed for mild pain   lisinopril (ZESTRIL) 10 mg tablet   No No   Sig: Take 1 tablet (10 mg total) by mouth daily   oxyCODONE 7 5 MG TABS   No No   Sig: Take 7 5 mg by mouth 2 (two) times a day as needed for severe pain Max Daily Amount: 15 mg   potassium chloride (K-DUR,KLOR-CON) 20 mEq tablet   No No   Sig: Take 1 tablet (20 mEq total) by mouth in the morning  simvastatin (ZOCOR) 40 mg tablet   No No   Sig: Take 1 tablet (40 mg total) by mouth in the morning     tamsulosin (FLOMAX) 0 4 mg   No No   Sig: Take 1 capsule (0 4 mg total) by mouth daily with dinner      Facility-Administered Medications: None       Past Medical History:   Diagnosis Date    Abdominal pain     [facility] 6/19/2018    Cardiac disease     [facility]     [facility], [facility]     Coronary artery disease     DDD (degenerative disc disease), lumbar 9/1/2020    Diabetes mellitus [facility]     Dyspnea     GERD (gastroesophageal reflux disease)     Hyperlipidemia     Hypertension     [facility]     with 3 stents    Nodule of apex of right lung     JACQUELINE (obstructive sleep apnea)     Prostate cancer [facility])        Past Surgical History:   Procedure Laterality Date    ABDOMINAL SURGERY      exploratory    ANGIOPLASTY      3 stents    APPENDECTOMY      COLONOSCOPY  2015    CT NEEDLE BIOPSY LUNG  11/3/2020    ESOPHAGOGASTRODUODENOSCOPY N/A 10/2/2017    Procedure: ESOPHAGOGASTRODUODENOSCOPY (EGD); Surgeon: Suzi Morse MD;  Location: [facility]; Service: Gastroenterology    IR THORACENTESIS  11/3/2020    KNEE CARTILAGE SURGERY      OTHER SURGICAL HISTORY      stent placement    PROSTATE SURGERY      SKIN GRAFT      Basal cell CA back       Family History   Problem Relation Age of Onset    Heart disease Father     Other Father         Mesothelioma      I have reviewed and agree with the history as documented  E-Cigarette/Vaping    E-Cigarette Use Never User      E-Cigarette/Vaping Substances    Nicotine No     THC No     CBD No     Flavoring No     Other No     Unknown No      Social History     Tobacco Use    Smoking status: Former Smoker     Packs/day: 1 50     Years: 50 00     Pack years: 75 00     Start date: 38     Quit date: 2020     Years since quittin 0    Smokeless tobacco: Never Used   Vaping Use    Vaping Use: Never used   Substance Use Topics    Alcohol use: Never    Drug use: No        Review of Systems   Unable to perform ROS: Mental status change       Physical Exam  ED Triage Vitals [08/15/22 2200]   Temperature Pulse Respirations Blood Pressure SpO2   (!) 92 8 °F (33 8 °C) 62 21 137/66 99 %      Temp Source Heart Rate Source Patient Position - Orthostatic VS BP Location FiO2 (%)   Rectal Monitor Held Right arm --      Pain Score       --             Orthostatic Vital Signs  Vitals:    22 0015 22 0030 22 0045 22 0100   BP:   129/70    Pulse: 60 62 60 76   Patient Position - Orthostatic VS:           Physical Exam  Vitals and nursing note reviewed  Constitutional:       Appearance: He is well-developed  He is ill-appearing  HENT:      Head: Normocephalic and atraumatic        Right Ear: Tympanic membrane, ear canal and external ear normal       Left Ear: Tympanic membrane, ear canal and external ear normal       Ears:      Comments: No hemotympanum bilaterally     Nose: Nose normal       Mouth/Throat:      Mouth: Mucous membranes are dry  Eyes:      Extraocular Movements: Extraocular movements intact  Conjunctiva/sclera: Conjunctivae normal       Comments: Pupils are +3 sluggish to react but symmetric bilaterally   Cardiovascular:      Rate and Rhythm: Regular rhythm  Tachycardia present  Pulses: Normal pulses  Heart sounds: Normal heart sounds  No murmur heard  Pulmonary:      Effort: Pulmonary effort is normal  No accessory muscle usage or respiratory distress  Breath sounds: Normal breath sounds  Decreased air movement present  No rhonchi or rales  Abdominal:      General: There is no distension  Palpations: Abdomen is soft  Tenderness: There is no abdominal tenderness  There is no right CVA tenderness, left CVA tenderness or rebound  Musculoskeletal:         General: Normal range of motion  Cervical back: Normal range of motion and neck supple  No rigidity  Skin:     General: Skin is warm and dry  Capillary Refill: Capillary refill takes 2 to 3 seconds  Neurological:      Mental Status: He is alert  GCS: GCS eye subscore is 4  GCS verbal subscore is 1  GCS motor subscore is 6  Cranial Nerves: No facial asymmetry  Sensory: Sensation is intact           ED Medications  Medications   dextrose 5 % and sodium chloride 0 9 % infusion (200 mL/hr Intravenous Rate/Dose Change 8/15/22 2250)   dextrose 10 % and normal saline infusion (has no administration in time range)   cefepime (MAXIPIME) 2 g/50 mL dextrose IVPB (has no administration in time range)   dextrose 50 % IV solution 50 mL (50 mL Intravenous Given 8/15/22 2253)       Diagnostic Studies  Results Reviewed     Procedure Component Value Units Date/Time    Fingerstick Glucose (POCT) [[id number]]  (Normal) Collected: 08/16/22 0112    Lab Status: Final result Updated: 08/16/22 0113     POC Glucose 69 mg/dl     HS Troponin I 2hr [[id number]] Collected: 08/16/22 0051    Lab Status: In process Specimen: Blood from Arm, Left Updated: 08/16/22 0055    Lactic acid 2 Hours [[id number]] Collected: 08/16/22 0050    Lab Status: In process Specimen: Blood from Arm, Left Updated: 08/16/22 0053    Fingerstick Glucose (POCT) [[id number]]  (Normal) Collected: 08/16/22 0048    Lab Status: Final result Updated: 08/16/22 0051     POC Glucose 74 mg/dl     HS Troponin I 4hr [[id number]]     Lab Status: No result Specimen: Blood     Fingerstick Glucose (POCT) [[id number]]  (Abnormal) Collected: 08/15/22 2326    Lab Status: Final result Updated: 08/15/22 2330     POC Glucose 147 mg/dl     Lactic acid [[id number]]  (Abnormal) Collected: 08/15/22 2203    Lab Status: Final result Specimen: Blood from Arm, Left Updated: 08/15/22 2328     LACTIC ACID 2 2 mmol/L     Narrative:      Result may be elevated if tourniquet was used during collection      Procalcitonin [[id number]]  (Normal) Collected: 08/15/22 2203    Lab Status: Final result Specimen: Blood from Arm, Left Updated: 08/15/22 2255     Procalcitonin 0 11 ng/ml     HS Troponin 0hr (reflex protocol) [[id number]]  (Normal) Collected: 08/15/22 2203    Lab Status: Final result Specimen: Blood from Arm, Left Updated: 08/15/22 2254     hs TnI 0hr 13 ng/L     Fingerstick Glucose (POCT) [[id number]]  (Abnormal) Collected: 08/15/22 2247    Lab Status: Final result Updated: 08/15/22 2251     POC Glucose 58 mg/dl     Fingerstick Glucose (POCT) [[id number]]  (Normal) Collected: 08/15/22 2156    Lab Status: Final result Updated: 08/15/22 2251     POC Glucose 83 mg/dl     Comprehensive metabolic panel [[id number]]  (Abnormal) Collected: 08/15/22 2203    Lab Status: Final result Specimen: Blood from Arm, Left Updated: 08/15/22 2245     Sodium 138 mmol/L      Potassium 3 7 mmol/L      Chloride 106 mmol/L      CO2 31 mmol/L      ANION GAP 1 mmol/L      BUN 38 mg/dL      Creatinine 1 63 mg/dL      Glucose 67 mg/dL      Calcium 8 2 mg/dL      Corrected Calcium 8 8 mg/dL      AST 21 U/L      ALT 18 U/L      Alkaline Phosphatase 88 U/L      Total Protein 8 1 g/dL      Albumin 3 2 g/dL      Total Bilirubin 0 31 mg/dL      eGFR 40 ml/min/1 73sq m     Narrative:      [place] guidelines for Chronic Kidney Disease (CKD):     Stage 1 with normal or high GFR (GFR > 90 mL/min/1 73 square meters)    Stage 2 Mild CKD (GFR = 60-89 mL/min/1 73 square meters)    Stage 3A Moderate CKD (GFR = 45-59 mL/min/1 73 square meters)    Stage 3B Moderate CKD (GFR = 30-44 mL/min/1 73 square meters)    Stage 4 Severe CKD (GFR = 15-29 mL/min/1 73 square meters)    Stage 5 End Stage CKD (GFR <15 mL/min/1 73 square meters)  Note: GFR calculation is accurate only with a steady state creatinine    CK (with reflex to MB) [[id number]]  (Normal) Collected: 08/15/22 2203    Lab Status: Final result Specimen: Blood from Arm, Left Updated: 08/15/22 2245     Total CK 87 U/L     Urine Microscopic [[id number]]  (Normal) Collected: 08/15/22 2217    Lab Status: Final result Specimen: Urine, Straight Cath Updated: 08/15/22 2242     RBC, UA 1-2 /hpf      WBC, UA 1-2 /hpf      Epithelial Cells Occasional /hpf      Bacteria, UA None Seen /hpf     UA w Reflex to Microscopic w Reflex to Culture [[id number]]  (Abnormal) Collected: 08/15/22 2217    Lab Status: Final result Specimen: Urine, Straight Cath Updated: 08/15/22 2240     Color, UA Light Yellow     Clarity, UA Clear     Specific Gravity, UA 1 011     pH, UA 5 5     Leukocytes, UA Negative     Nitrite, UA Negative     Protein,  (2+) mg/dl      Glucose, UA Negative mg/dl      Ketones, UA Negative mg/dl      Urobilinogen, UA <2 0 mg/dl      Bilirubin, UA Negative     Occult Blood, UA Trace    Protime-INR [[id number]]  (Normal) Collected: 08/15/22 2203    Lab Status: Final result Specimen: Blood from Arm, Left Updated: 08/15/22 2237     Protime 12 8 seconds      INR 0 94    APTT [[id number]]  (Normal) Collected: 08/15/22 2203    Lab Status: Final result Specimen: Blood from Arm, Left Updated: 08/15/22 2237     PTT 26 seconds     CBC and differential [[id number]]  (Abnormal) Collected: 08/15/22 2203    Lab Status: Final result Specimen: Blood from Arm, Left Updated: 08/15/22 2226     WBC 8 87 Thousand/uL      RBC 5 28 Million/uL      Hemoglobin 14 2 g/dL      Hematocrit 45 2 %      MCV 86 fL      MCH 26 9 pg      MCHC 31 4 g/dL      RDW 17 1 %      MPV 10 5 fL      Platelets 107 Thousands/uL      nRBC 0 /100 WBCs      Neutrophils Relative 86 %      Immat GRANS % 1 %      Lymphocytes Relative 7 %      Monocytes Relative 6 %      Eosinophils Relative 0 %      Basophils Relative 0 %      Neutrophils Absolute 7 61 Thousands/µL      Immature Grans Absolute 0 08 Thousand/uL      Lymphocytes Absolute 0 59 Thousands/µL      Monocytes Absolute 0 53 Thousand/µL      Eosinophils Absolute 0 03 Thousand/µL      Basophils Absolute 0 03 Thousands/µL     Blood gas, venous [[id number]]  (Abnormal) Collected: 08/15/22 2203    Lab Status: Final result Specimen: Blood from Arm, Left Updated: 08/15/22 2224     pH, Dwain 7 228     pCO2, Dwain 75 2 mm Hg      pO2, Dwain 31 0 mm Hg      HCO3, Dwain 30 6 mmol/L      Base Excess, Dwain 0 7 mmol/L      O2 Content, Dwain 10 4 ml/dL      O2 HGB, VENOUS 49 9 %     Blood culture #2 [[id number]] Collected: 08/15/22 2203    Lab Status: In process Specimen: Blood from Arm, Left Updated: 08/15/22 2216    Blood culture #1 [[id number]] Collected: 08/15/22 2203    Lab Status:  In process Specimen: Blood from Arm, Left Updated: 08/15/22 2216    POCT Blood Gas (CG8+) [[id number]]  (Abnormal) Collected: 08/15/22 2201    Lab Status: Final result Specimen: Venous Updated: 08/15/22 2205     ph, Dwain ISTAT 7 287     pCO2, Dwain i-STAT 69 2 mm HG      pO2, Dwain i-STAT 21 0 mm HG      BE, i-STAT 4 mmol/L      HCO3, Dwain i-STAT 33 0 mmol/L      CO2, i-STAT 35 mmol/L      O2 Sat, i-STAT 26 %      SODIUM, I-STAT 140 mmol/l      Potassium, i-STAT 3 9 mmol/L      Calcium, Ionized i-STAT 1 07 mmol/L      Hct, i-STAT 41 %      Hgb, i-STAT 13 9 g/dl      Glucose, i-STAT 71 mg/dl      Specimen Type VENOUS                 XR chest 1 view portable   ED Interpretation by Johnnie Esparza DO (08/16 0108)   Complex effusion on right side      CT head without contrast   Final Result by Leta Glover MD (08/16 8022)      No acute intracranial hemorrhage, midline shift, or mass effect  Workstation performed: [id number]         CT abdomen pelvis w contrast    (Results Pending)         Procedures  ECG 12 Lead Documentation Only    Date/Time: 8/15/2022 10:00 PM  Performed by: Johnnie Esparza DO  Authorized by: Johnnie Esparza DO     Indications / Diagnosis:  Hypoglycemia  ECG reviewed by me, the ED Provider: yes    Patient location:  ED  Previous ECG:     Previous ECG:  Compared to current    Similarity:  Changes noted  Interpretation:     Interpretation: abnormal    Rate:     ECG rate:  68    ECG rate assessment: normal    Ectopy:     Ectopy: PVCs    QRS:     QRS axis:  Right    QRS intervals: Wide  Conduction:     Conduction: abnormal      Abnormal conduction: complete RBBB    ST segments:     ST segments:  Non-specific  T waves:     T waves: non-specific            ED Course  ED Course as of 08/16/22 0121   Mon Aug 15, 2022   2217 Blood Pressure: 137/66   2217 Temperature(!): 92 8 °F (33 8 °C)   2217 Temp Source: Rectal   2217 Pulse: 62   2217 Respirations: 21   2217 SpO2: 99 %   2217 Patient is a 81-year-old male presenting from home after being found by his son unresponsive  Per EMS patient's glucose was in the 20s they gave him an amp of glucose a came up to the 200s  Upon arrival to the hospital patient was minimally responsive coming around  Putting care glucose was taken which showed was in his 76s  Rectal temperature was 92 8° bear hugger was initiated  D5 normal saline infusion was started  Septic workup in place    As well as CK and troponin will check chest x-ray given patient is not having great air exchange but is oxygenating just fine on room air  Patient is following commands but is sluggish coming around  Will also get a CT head as he is on asa and it was unwitnessd  Will frequently re-evaluate  2238 ph, Kinga Leahy(!): 7 287   2239 pCO2, Dwain i-STAT(!): 69 2   2248 Patient is hypoglycemic again at 58  Will give glucose  2249 Creatinine(!): 1 63  At baseline   2249 BUN(!): 38  At baseline   2255 POC Glucose(!): 58   2311 Blood Pressure: 118/56   2311 Pulse: 60   2311 Respirations: 16  Patient able to tell me his name  2311 SpO2: 97 %   2326 Called CT for patient to be brought down for Ct  They are coming to get him now  2328 LACTIC ACID(!!): 2 2   2330 POC Glucose(!): 147   Tue Aug 16, 2022   0033 CHF hx so changed to D10 by Kiran   0119 CT head read as normal  Admitted to [facility]  SBIRT 22yo+    Flowsheet Row Most Recent Value   SBIRT (25 yo +)    In order to provide better care to our patients, we are screening all of our patients for alcohol and drug use  Would it be okay to ask you these screening questions?  Unable to answer at this time Filed at: 08/15/2022 2216                MDM    Disposition  Final diagnoses:   Hypoglycemia   Altered mental status   Elevated lactic acid level     Time reflects when diagnosis was documented in both MDM as applicable and the Disposition within this note     Time User Action Codes Description Comment    8/16/2022  1:15 AM Yeni Overall Add [E16 2] Hypoglycemia     8/16/2022  1:15 AM Yeni Overall Add [R41 82] Altered mental status     8/16/2022  1:20 AM Yeni Overall Add [R79 89] Elevated lactic acid level       ED Disposition     ED Disposition   Admit    Condition   Stable    Date/Time   Tue Aug 16, 2022  1:15 AM    Comment   Case was discussed with Dr Dipti Chinchilla and the patient's admission status was agreed to be inpatient to the service of [facility] Follow-up Information    None         Patient's Medications   Discharge Prescriptions    No medications on file     No discharge procedures on file  PDMP Review       Value Time User    PDMP Reviewed  Yes 8/16/2022  1:15 AM Paris Dennis DO           ED Provider  Attending physically available and evaluated Raeann George Sr    I managed the patient along with the ED Attending      Electronically Signed by         Clari Houser DO  08/16/22 0123

## 2022-08-16 NOTE — ASSESSMENT & PLAN NOTE
· Marked encephalopathy, currently suspect in setting of hypoglycemia    Only groans and localizes to voice, only intermittently follows commands  · Remainder of labs without marked abnormality compared to prior, CT head negative for acute intracranial pathology  · Patient does groan during abdominal exam, will obtain CT abdomen/pelvis  · Check TSH/B12/folate  · Close monitoring of mental status, NPO for now pending improvement in mental status

## 2022-08-16 NOTE — ASSESSMENT & PLAN NOTE
Lab Results   Component Value Date    EGFR 45 08/16/2022    EGFR 40 08/15/2022    EGFR 41 07/06/2022    CREATININE 1 48 (H) 08/16/2022    CREATININE 1 63 (H) 08/15/2022    CREATININE 1 60 (H) 07/06/2022   · Creatinine near recent baseline, monitor with BMP

## 2022-08-16 NOTE — ASSESSMENT & PLAN NOTE
Lab Results   Component Value Date    EGFR 40 08/15/2022    EGFR 41 07/06/2022    EGFR 37 07/05/2022    CREATININE 1 63 (H) 08/15/2022    CREATININE 1 60 (H) 07/06/2022    CREATININE 1 74 (H) 07/05/2022   · Creatinine near recent baseline, monitor with BMP

## 2022-08-16 NOTE — TELEPHONE ENCOUNTER
RN to call prior to going to floor  wants scan done then due to temp being low and trying to maintain that at this time    6613  rdr

## 2022-08-16 NOTE — RESPIRATORY THERAPY NOTE
RT Protocol Note  Grady Damico Sr  78 y o  male MRN: [id number]  [facility] Encounter: [id number]    Assessment    Principal Problem:    Type 2 diabetes mellitus with hypoglycemia without coma, with long-term current use of insulin ([facility])  Active Problems:    COPD, severe (HCC)    Essential hypertension    Acute metabolic encephalopathy    CKD (chronic kidney disease) stage 3, GFR 30-59 ml/min ([facility])    Hypothermia      Home Pulmonary Medications:  Trelegy, duoneb, albuterol     Home Devices/Therapy: (P) Home O2    Past Medical History:   Diagnosis Date    Abdominal pain     MARANDA (acute kidney injury) ([facility] ) 2018    Cardiac disease     CHF (congestive heart failure) ([facility])     COPD, severe ([facility])     Coronary artery disease     DDD (degenerative disc disease), lumbar 2020    Diabetes mellitus ([facility])     Dyspnea     GERD (gastroesophageal reflux disease)     Hyperlipidemia     Hypertension     MI (myocardial infarction) ([place] [address] )     with 3 stents    Nodule of apex of right lung     JACQUELINE (obstructive sleep apnea)     Prostate cancer ([place] [address] )      Social History     Socioeconomic History    Marital status:       Spouse name: None    Number of children: 1    Years of education: 8    Highest education level: None   Occupational History    None   Tobacco Use    Smoking status: Former Smoker     Packs/day: 1 50     Years: 50 00     Pack years: 75 00     Start date: 38     Quit date: 2020     Years since quittin 0    Smokeless tobacco: Never Used   Vaping Use    Vaping Use: Never used   Substance and Sexual Activity    Alcohol use: Never    Drug use: No    Sexual activity: Not Currently     Partners: Female   Other Topics Concern    None   Social History Narrative    Most recent tobacco use screenin2018    Do you currently or have you served in the [employer]: No    Were you activated, into active duty, as a member of the [employer] or as a Reservist: No    Caffeine intake: Moderate    Alcohol intake: None    Marital status:     Number of children: 1    Education: 8    Occupation: retired    Sexual orientation: Heterosexual    General stress level: Medium    Live alone or with others: with others    Exercise level: None    Sexually active: No    Overweight: Yes    Obese: Yes    Guns present in home: No    Seat belts used routinely: Yes    Advance directive: No    Sunscreen used routinely: No    Smoke alarm in home: Yes    Legally blind in one or both eyes: No    Hard of hearing or deaf in one or both ears: No     Social Determinants of Health     Financial Resource Strain: Not on file   Food Insecurity: No Food Insecurity    Worried About Running Out of Food in the Last Year: Never true    [address] in the Last Year: Never true   Transportation Needs: No Transportation Needs    Lack of Transportation (Medical): No    Lack of Transportation (Non-Medical): No   Physical Activity: Not on file   Stress: Not on file   Social Connections: Not on file   Intimate Partner Violence: Not on file   Housing Stability: Low Risk     Unable to Pay for Housing in the Last Year: No    Number of Places Lived in the Last Year: 1    Unstable Housing in the Last Year: No       Subjective         Objective    Physical Exam:   Assessment Type: (P) Pre-treatment  General Appearance: (P) Alert, Awake  Respiratory Pattern: (P) Symmetrical, Tripod position  Chest Assessment: (P) Chest expansion symmetrical  Bilateral Breath Sounds: (P) Clear, Diminished  Cough: (P) Unable to assess  O2 Device: (P) ra    Vitals:  Blood pressure 149/75, pulse 96, temperature 97 9 °F (36 6 °C), temperature source Oral, resp  rate 18, height 6' (1 829 m), weight 100 kg (220 lb 7 4 oz), SpO2 93 %  Imaging and other studies: I have personally reviewed pertinent reports        O2 Device: (P) ra     Plan    Respiratory Plan: (P) Home Bronchodilator Patient pathway        Resp Comments: (P) Pt admitted for uncontrolled diabetes  Pt has a hx of severe COPD  Pt found on RA Spo2 93%, appears barrel chested and in tripod position but in no distress  Pt is unreliable in providing accurate smoking hx, states he quit 20 years ago but "cheats" and "trying to quit again"  Wears 2L O2 as needed at home but states he rarely uses it  Home meds include trelegy, duoneb, and albuterol  Pt to be ordered xopenex/atrovent TID and albuterol prn and pt to use breath accuated nebulizer

## 2022-08-16 NOTE — CONSULTS
Consultation - Rasta Puente  78 y o  male MRN: [id number]    Unit/Bed#: [facility] 508-01 Encounter: [id number]      Impression:  Diabetes type 2, on long-term insulin use with history of complications  Hypoglycemia  History of CKD, neuropathy  History of hypertension    Assessment: This is a 78y o -year-old male with  past medical history of diabetes type 2 long-term insulin use with history of complications  History of CKD, neuropathy  Who was admitted to hospital for hypoglycemia  Endocrinology consulted for diabetes management    Plan:  A1c 10  Home regimen:  Insulin U500: 55 units before breakfast, 55 units before lunch, and 35 units before dinner  Patient had an episode of hypoglycemia in the 40s and was treated with D5 infusion  D5 infusion was escalated to D10 infusion currently in hold, no more episodes of hypoglycemia today  Currently patient is on correctional scale  Recommend to start Novolog mix insulin 70/30 recommend to start 45 units every 8 hours starting today at 3 pm  At discharge consider to prescribe Mix insulin instead of U500, to lower risk of hypoglycemia  Follow up with Endocrinology outpt  CC: Diabetes Consult    HPI: Rasta Puente  is a 78y o  year old male with type 2 diabetes, with history of complications, neuropathy, CKD, retinopathy   Who was admitted to the hospital for an episode of hypoglycemia  On admission patient was started on D5 infusion  And then was escalated D10 infusion  Patient reports that he is compliant with his home insulin and he denied taking a higher dose of his insulin  Patient reports that he was eating okay at home  Currently patient reports neuropathy, and denies any other symptoms associated with diabetes such as, polydipsia, polyuria ,polyphagia  Pt f/u with endocrinology outpt      Review of Systems   Constitutional: Negative for chills and fever  HENT: Negative for ear pain and sore throat      Eyes: Negative for pain and visual disturbance  Respiratory: Negative for cough and shortness of breath  Cardiovascular: Negative for chest pain and palpitations  Gastrointestinal: Negative for abdominal pain and vomiting  Genitourinary: Negative for dysuria and hematuria  Musculoskeletal: Negative for arthralgias and back pain  Skin: Negative for color change and rash  Neurological: Negative for seizures and syncope  All other systems reviewed and are negative  Historical Information   Past Medical History:   Diagnosis Date    Abdominal pain     MARANDA (acute kidney injury) ([place] [id number] ) 2018    Cardiac disease     [facility]congestive heart failure) [facility])     COPD, severe (HCC)     Coronary artery disease     DDD (degenerative disc disease), lumbar 2020    Diabetes mellitus ([place] [id number] )     Dyspnea     GERD (gastroesophageal reflux disease)     Hyperlipidemia     Hypertension     MI (myocardial infarction) ([place] [address] )     with 3 stents    Nodule of apex of right lung     JACQUELINE (obstructive sleep apnea)     Prostate cancer [facility])      Past Surgical History:   Procedure Laterality Date    ABDOMINAL SURGERY      exploratory    ANGIOPLASTY      3 stents    APPENDECTOMY      COLONOSCOPY      CT NEEDLE BIOPSY LUNG  11/3/2020    ESOPHAGOGASTRODUODENOSCOPY N/A 10/2/2017    Procedure: ESOPHAGOGASTRODUODENOSCOPY (EGD); Surgeon: Octavio Ambrose MD;  Location: [facility];   Service: Gastroenterology    IR THORACENTESIS  11/3/2020    KNEE CARTILAGE SURGERY      OTHER SURGICAL HISTORY      stent placement    PROSTATE SURGERY      SKIN GRAFT      Basal cell CA back     Social History   Social History     Substance and Sexual Activity   Alcohol Use Never     Social History     Substance and Sexual Activity   Drug Use No     Social History     Tobacco Use   Smoking Status Former Smoker    Packs/day: 1 50    Years: 50 00    Pack years: 75 00    Start date: 38    Quit date: 2020    Years since quittin 0   Smokeless Tobacco Never Used     Family History:   Family History   Problem Relation Age of Onset    Heart disease Father     Other Father         Mesothelioma        Meds/Allergies   Current Facility-Administered Medications   Medication Dose Route Frequency Provider Last Rate Last Admin    albuterol inhalation solution 2 5 mg  2 5 mg Nebulization Q4H PRN Lilly Hastings MD        aspirin (ECOTRIN LOW STRENGTH) EC tablet 81 mg  81 mg Oral Daily Milta Halsted, DO   81 mg at 08/16/22 1000    carvedilol (COREG) tablet 6 25 mg  6 25 mg Oral BID With Meals Milta Halsted, DO   6 25 mg at 08/16/22 1000    cyanocobalamin injection 1,000 mcg  1,000 mcg Intramuscular Daily Lilly Hastings MD   1,000 mcg at 08/16/22 1218    ferrous sulfate tablet 325 mg  325 mg Oral Daily With Breakfast Milta Halsted, DO   325 mg at 08/16/22 1000    furosemide (LASIX) tablet 40 mg  40 mg Oral Daily Milta Halsted, DO   40 mg at 08/16/22 1000    gabapentin (NEURONTIN) capsule 100 mg  100 mg Oral TID Milta Halsted, DO   100 mg at 08/16/22 1000    heparin (porcine) subcutaneous injection 5,000 Units  5,000 Units Subcutaneous [facility] Milta Halsted, DO   5,000 Units at 08/16/22 0529    insulin lispro (HumaLOG) 100 units/mL subcutaneous injection 1-5 Units  1-5 Units Subcutaneous HS Milta Halsted, DO        insulin lispro (HumaLOG) 100 units/mL subcutaneous injection 1-6 Units  1-6 Units Subcutaneous TID AC Milta Halsted, DO   6 Units at 08/16/22 1149    ipratropium (ATROVENT) 0 02 % inhalation solution 0 5 mg  0 5 mg Nebulization TID Lilly Hastings MD   0 5 mg at 08/16/22 0859    levalbuterol ([place]) inhalation solution 1 25 mg  1 25 mg Nebulization TID Lilly Hastings MD   1 25 mg at 08/16/22 0859    lisinopril (ZESTRIL) tablet 10 mg  10 mg Oral Daily Milta Halsted, DO   10 mg at 08/16/22 1000    ondansetron (ZOFRAN) injection 4 mg  4 mg Intravenous Q6H PRN Milta Halsted, DO        oxyCODONE (ROXICODONE) IR tablet 7 5 mg  7 5 mg Oral BID PRN Isma Garrido DO        pravastatin (PRAVACHOL) tablet 80 mg  80 mg Oral Daily With Elizabeth Kwon DO        tamsulosin [facility]) capsule 0 4 mg  0 4 mg Oral Daily With Elizabeth Kwon DO         Allergies   Allergen Reactions    Crestor [Rosuvastatin] Other (See Comments)     Unknown      Metformin GI Intolerance       Objective   Vitals: Blood pressure 133/69, pulse 84, temperature 97 9 °F (36 6 °C), temperature source Oral, resp  rate 18, height 6' (1 829 m), weight 100 kg (220 lb 7 4 oz), SpO2 93 %  Intake/Output Summary (Last 24 hours) at 8/16/2022 1321  Last data filed at 8/16/2022 1221  Gross per 24 hour   Intake 1980 ml   Output 2325 ml   Net -345 ml     Invasive Devices  Report    Peripheral Intravenous Line  Duration           Peripheral IV 08/15/22 Left Arm <1 day    Peripheral IV 08/16/22 Proximal;Right;Ventral (anterior) Forearm <1 day          Drain  Duration           Urethral Catheter Temperature probe 16 Fr  <1 day                Physical Exam  Constitutional:       General: He is not in acute distress  Appearance: He is not ill-appearing  HENT:      Head: Normocephalic and atraumatic  Nose: No congestion or rhinorrhea  Eyes:      Conjunctiva/sclera: Conjunctivae normal       Pupils: Pupils are equal, round, and reactive to light  Cardiovascular:      Rate and Rhythm: Normal rate  Pulses: Normal pulses  Heart sounds: No murmur heard  Pulmonary:      Effort: No respiratory distress  Breath sounds: Normal breath sounds  No wheezing  Abdominal:      General: There is no distension  Tenderness: There is no abdominal tenderness  Musculoskeletal:         General: No swelling or tenderness  Cervical back: No rigidity or tenderness  Skin:     Coloration: Skin is not jaundiced or pale  Neurological:      Mental Status: He is alert and oriented to person, place, and time  Motor: No weakness     Psychiatric:         Mood and Affect: Mood normal          Thought Content: Thought content normal          Lab Results:       Lab Results   Component Value Date    WBC 11 45 (H) 08/16/2022    HGB 11 7 (L) 08/16/2022    HCT 37 2 08/16/2022    MCV 86 08/16/2022     (L) 08/16/2022     Lab Results   Component Value Date/Time    BUN 37 (H) 08/16/2022 05:36 AM    BUN 17 09/09/2015 05:30 AM     09/09/2015 05:30 AM    K 3 8 08/16/2022 05:36 AM    K 4 2 09/09/2015 05:30 AM     08/16/2022 05:36 AM     09/09/2015 05:30 AM    CO2 27 08/16/2022 05:36 AM    CO2 35 (H) 08/15/2022 10:01 PM    CREATININE 1 48 (H) 08/16/2022 05:36 AM    CREATININE 1 03 09/09/2015 05:30 AM    AST 21 08/15/2022 10:03 PM    AST 19 09/08/2015 02:00 PM    ALT 18 08/15/2022 10:03 PM    ALT 27 09/08/2015 02:00 PM    ALB 3 2 (L) 08/15/2022 10:03 PM    ALB 3 3 (L) 09/08/2015 02:00 PM     No results for input(s): CHOL, HDL, LDL, TRIG, VLDL in the last 72 hours  No results found for: Kana Avery  POC Glucose (mg/dl)   Date Value   08/16/2022 393 (H)   08/16/2022 196 (H)   08/16/2022 97   08/16/2022 69   08/16/2022 74   08/15/2022 147 (H)   08/15/2022 58 (L)   08/15/2022 83   07/07/2022 142 (H)   07/06/2022 109         Portions of the record may have been created with voice recognition software

## 2022-08-16 NOTE — ED ATTENDING ATTESTATION
8/15/2022  IZak MD, saw and evaluated the patient  I have discussed the patient with the resident/non-physician practitioner and agree with the resident's/non-physician practitioner's findings, Plan of Care, and MDM as documented in the resident's/non-physician practitioner's note, except where noted  All available labs and Radiology studies were reviewed  I was present for key portions of any procedure(s) performed by the resident/non-physician practitioner and I was immediately available to provide assistance  At this point I agree with the current assessment done in the Emergency Department  I have conducted an independent evaluation of this patient a history and physical is as follows:    ED Course     Patient is a 37-year-old male with history of lung mass, COPD, diabetes mellitus presents with acute altered mental status patient was noted to be found down brought on known  Time  Blood sugar noted to be in the 20s by EMS given dextrose with mild improvement of symptoms however mental status changes persisted  Patient transported to the hospital on arrival, patient noted to be alert but nonverbal   Vital signs reviewed patient noted to be hypothermic  No external signs of trauma  Heart regular rate rhythm lungs decreased air entry but bilateral breath sounds noted  Abdomen soft nontender nondistended  Extremities no edema no rashes noted  Patient's blood sugar noted to be trending downward patient started on D5 infusion with rate increased ultimately transition to a D10 infusion  Lab studies sent culture sent  Serenity Garzon applied for hypothermia  Plan to check chest x-ray CTAP sepsis labs    Anticipate admission    Critical Care Time  CriticalCare Time  Performed by: Zak Peguero MD  Authorized by: Zak Peguero MD     Critical care provider statement:     Critical care time (minutes):  34    Critical care time was exclusive of:  Separately billable procedures and treating other patients and teaching time    Critical care was necessary to treat or prevent imminent or life-threatening deterioration of the following conditions:  Endocrine crisis and metabolic crisis    Critical care was time spent personally by me on the following activities:  Obtaining history from patient or surrogate, development of treatment plan with patient or surrogate, evaluation of patient's response to treatment, examination of patient, ordering and performing treatments and interventions, ordering and review of laboratory studies, ordering and review of radiographic studies and re-evaluation of patient's condition    I assumed direction of critical care for this patient from another provider in my specialty: no

## 2022-08-16 NOTE — ASSESSMENT & PLAN NOTE
· Marked encephalopathy, currently suspect in setting of hypoglycemia    Only groans and localizes to voice, only intermittently follows commands  · Remainder of labs without marked abnormality compared to prior, CT head negative for acute intracranial pathology  · Resolved  · B12 found to be low, will start supplementation with monthly injections

## 2022-08-17 ENCOUNTER — TELEPHONE (OUTPATIENT)
Dept: FAMILY MEDICINE CLINIC | Facility: CLINIC | Age: 77
End: 2022-08-17

## 2022-08-17 DIAGNOSIS — I50.33 ACUTE ON CHRONIC DIASTOLIC (CONGESTIVE) HEART FAILURE (HCC): ICD-10-CM

## 2022-08-17 DIAGNOSIS — I25.10 CORONARY ARTERY DISEASE INVOLVING NATIVE CORONARY ARTERY OF NATIVE HEART WITHOUT ANGINA PECTORIS: ICD-10-CM

## 2022-08-17 DIAGNOSIS — I10 ESSENTIAL HYPERTENSION: ICD-10-CM

## 2022-08-17 RX ORDER — LISINOPRIL 10 MG/1
TABLET ORAL
Qty: 90 TABLET | Refills: 0 | OUTPATIENT
Start: 2022-08-17 | End: 2022-08-22

## 2022-08-17 RX ORDER — FUROSEMIDE 40 MG/1
TABLET ORAL
Qty: 90 TABLET | Refills: 0 | OUTPATIENT
Start: 2022-08-17 | End: 2022-08-22

## 2022-08-17 RX ORDER — CARVEDILOL 6.25 MG/1
TABLET ORAL
Qty: 180 TABLET | Refills: 0 | OUTPATIENT
Start: 2022-08-17 | End: 2022-08-22

## 2022-08-17 NOTE — UTILIZATION REVIEW
Initial Clinical Review    Admission: Date/Time/Statement:   Admission Orders (From admission, onward)     Ordered        08/16/22 0116  INPATIENT ADMISSION  Once                      Orders Placed This Encounter   Procedures    INPATIENT ADMISSION     Standing Status:   Standing     Number of Occurrences:   1     Order Specific Question:   Level of Care     Answer:   Level 2 Stepdown / HOT [14]     Order Specific Question:   Estimated length of stay     Answer:   More than 2 Midnights     Order Specific Question:   Certification     Answer:   I certify that inpatient services are medically necessary for this patient for a duration of greater than two midnights  See H&P and MD Progress Notes for additional information about the patient's course of treatment  ED Arrival Information     Expected   -    Arrival   8/15/2022 21:52    Acuity   Emergent            Means of arrival   Ambulance    Escorted by   [facility]    Service   Hospitalist    Admission type   Emergency            Arrival complaint   unresponsive            Chief Complaint   Patient presents with    Hypoglycemia - Symptomatic     Found by son down between 10 min and 3 hrs  BBG 23 recoveredd to 212 after 25g dextrose  Pt alert yet still not responsive  Initial Presentation: 78 y o  male , presented to the ED @ [facility], from home via EMS  Admitted as Inpatient due to Type 2 Diabetes Mellitus without Coma, with long term current use of Insulin  Date: 08/16/2022    Altered mental status/unresponsiveness as found by son at home  He has a past medical history significant for type 2 diabetes on intensive insulin therapy, COPD, chronic diastolic heart failure, chronic respiratory failure requiring 2 L NC continuously, hypertension, chronic kidney disease stage 3; notably has had multiple admissions at this hospital this calendar year for exacerbations of his multiple comorbidities    History is obtained from chart review and discussion with ED physician as patient with marked altered mental status  and does not participate in history and I am unable to reach son at this time for further clarification  Remains with low blood sugars despite D50 then D5 GTT, additionally remaining with encephalopathy  For now transitioned to D10 GTT, monitor blood glucose every 2 hours  Hold insulin for now  Continue [employer]  Monitor & trend BMP       08/16/2022  Consult Endocrine:  Assessment: This is a 78y o -year-old male with  past medical history of diabetes type 2 long-term insulin use with history of complications  History of CKD, neuropathy  Who was admitted to hospital for hypoglycemia  Endocrinology consulted for diabetes management    Plan:  A1c 10  Home regimen:  Insulin U500: 55 units before breakfast, 55 units before lunch, and 35 units before dinner  Patient had an episode of hypoglycemia in the 40s and was treated with D5 infusion  D5 infusion was escalated to D10 infusion currently in hold, no more episodes of hypoglycemia today  Currently patient is on correctional scale  Recommend to start Novolog mix insulin 70/30 recommend to start 45 units every 8 hours starting today at 3 pm  At discharge consider to prescribe Mix insulin instead of U500, to lower risk of hypoglycemia  Follow up with Endocrinology outpt       ED Triage Vitals   Temperature Pulse Respirations Blood Pressure SpO2   08/15/22 2200 08/15/22 2200 08/15/22 2200 08/15/22 2200 08/15/22 2200   (!) 92 8 °F (33 8 °C) 62 21 137/66 99 %      Temp Source Heart Rate Source Patient Position - Orthostatic VS BP Location FiO2 (%)   08/15/22 2200 08/15/22 2200 08/15/22 2200 08/15/22 2200 --   Rectal Monitor Held Right arm       Pain Score       08/16/22 0725       No Pain          Wt Readings from Last 1 Encounters:   08/16/22 100 kg (220 lb 7 4 oz)     Additional Vital Signs:   Date/Time Temp Pulse Resp BP MAP (mmHg) SpO2 O2 Device Patient Position - Orthostatic VS 08/16/22 17:07:43 -- -- -- 154/75 101 -- -- --   08/16/22 15:33:44 -- -- 17 153/75 101 -- -- --   08/16/22 1350 -- -- -- -- -- 93 % -- --   08/16/22 10:01:44 -- 84 -- 133/69 90 93 % -- --   08/16/22 1000 -- -- -- 133/69 -- -- -- --   08/16/22 0859 -- -- -- -- -- 93 % -- --   08/16/22 0545 97 9 °F (36 6 °C) 96 -- 149/75 -- 93 % None (Room air) --   08/16/22 0500 97 9 °F (36 6 °C) 90 -- -- -- 97 % None (Room air) --   08/16/22 0445 -- 96 -- 163/76 102 -- -- --   08/16/22 0415 97 8 °F (36 6 °C) 86 -- 129/68 93 94 % -- --   08/16/22 0400 -- 98 -- 136/67 96 -- -- --   08/16/22 0345 -- 84 -- 138/65 89 -- -- --   08/16/22 0330 97 3 °F (36 3 °C) Abnormal  80 -- 119/64 87 94 % -- --   08/16/22 0315 96 9 °F (36 1 °C) Abnormal  78 -- 115/65 85 94 % -- --   08/16/22 0300 -- 80 -- 123/59 85 94 % -- --   08/16/22 0245 95 5 °F (35 3 °C) Abnormal  82 -- 128/65 90 96 % -- --   08/16/22 0230 95 3 °F (35 2 °C) Abnormal  78 -- 114/58 80 94 % -- --   08/16/22 0200 -- 70 -- 128/69 91 93 % -- --   08/16/22 0145 95 °F (35 °C) Abnormal  64 18 126/62 89 95 % None (Room air) Lying   08/16/22 0130 -- 66 -- 140/62 91 95 % -- --   08/16/22 0115 -- 62 -- 144/70 100 96 % -- --   08/16/22 0109 94 6 °F (34 8 °C) Abnormal  -- -- -- -- -- -- --   08/16/22 0100 94 6 °F (34 8 °C) Abnormal  76 -- -- -- 95 % -- --   08/16/22 0045 92 1 °F (33 4 °C) Abnormal  60 20 129/70 94 96 % -- --   08/16/22 0030 93 9 °F (34 4 °C) Abnormal  62 18 -- -- 96 % -- --   08/16/22 0015 94 3 °F (34 6 °C) Abnormal  60 25 Abnormal  -- -- 97 % -- --   08/16/22 0000 93 9 °F (34 4 °C) Abnormal  60 22 -- -- 96 % -- --   08/15/22 2345 93 2 °F (34 °C) Abnormal  60 18 -- -- 97 % -- --   08/15/22 2330 -- 60 20 109/57 78 98 % -- --   08/15/22 2315 93 6 °F (34 2 °C) Abnormal  60 13 116/59 84 97 % -- --   08/15/22 2305 -- 60 16 118/56 80 97 % -- --   08/15/22 2300 93 6 °F (34 2 °C) Abnormal  60 20 104/58 78 98 % -- --   08/15/22 2245 93 6 °F (34 2 °C) Abnormal  60 23 Abnormal  141/73 100 99 % -- --   08/15/22 2230 93 9 °F (34 4 °C) Abnormal  60 14 133/64 92 97 % -- --   08/15/22 2222 93 56 °F (34 2 °C) Abnormal  60 24 Abnormal  145/65 93 100 % None (Room air) --   08/15/22 2215 -- 60 24 Abnormal  146/65 94 100 % -- --     Date and Time Eye Opening Best Verbal Response Best Motor Response Meseret Coma Scale Score   22 0725 4 5 6 15   08/15/22 2228 4 5 6 15     08/15/2022 @ 2200  EC, NSR, RBBB    Pertinent Labs/Diagnostic Test Results:   CT abdomen pelvis w contrast   Final Result by Thao Church MD ( [id number])      Focal left upper lobe small bowel ileus  No evidence for small bowel obstruction  No transition point  The remainder of the visualized bowel is unremarkable  Unchanged right lower lobe findings as above  XR chest 1 view portable   ED Interpretation by Mari Gomez DO ( [id number])   Complex effusion on right side      Final Result by Linn Gutierrez MD ( [id number])      Pleural-parenchymal density at the right lung base has mildly worsened as compared to the studies of  and April  Increased effusion and/or new pneumonia could be present superimposed on more chronic changes noted previously  Follow-up is advised      There is continued volume loss in the right hemithorax  The left lung appears clear  The study was marked in [facility] for immediate notification  CT head without contrast   Final Result by Roshan Simons MD ( [id number])      No acute intracranial hemorrhage, midline shift, or mass effect          Results from last 7 days   Lab Units 22  0536 08/15/22  2203 08/15/22  2201   WBC Thousand/uL 11 45* 8 87  --    HEMOGLOBIN g/dL 11 7* 14 2  --    I STAT HEMOGLOBIN g/dl  --   --  13 9   HEMATOCRIT % 37 2 45 2  --    HEMATOCRIT, ISTAT %  --   --  41   PLATELETS Thousands/uL 119* 141*  --    NEUTROS ABS Thousands/µL  --  7 61  --      Results from last 7 days   Lab Units 22  0536 08/15/22  2203 08/15/22  2201   SODIUM mmol/L 137 138  --    POTASSIUM mmol/L 3 8 3 7  --    CHLORIDE mmol/L 105 106  --    CO2 mmol/L 27 31  --    CO2, I-STAT mmol/L  --   --  35*   ANION GAP mmol/L 5 1*  --    BUN mg/dL 37* 38*  --    CREATININE mg/dL 1 48* 1 63*  --    EGFR ml/min/1 73sq m 45 40  --    CALCIUM mg/dL 7 5* 8 2*  --    CALCIUM, IONIZED, ISTAT mmol/L  --   --  1 07*     Results from last 7 days   Lab Units 08/15/22  2203   AST U/L 21   ALT U/L 18   ALK PHOS U/L 88   TOTAL PROTEIN g/dL 8 1   ALBUMIN g/dL 3 2*   TOTAL BILIRUBIN mg/dL 0 31     Results from last 7 days   Lab Units 08/16/22  1555 08/16/22  1504 08/16/22  1142 08/16/22  0528 08/16/22  0256 08/16/22  0112 08/16/22  0048 08/15/22  2326 08/15/22  2247 08/15/22  2156   POC GLUCOSE mg/dl 297* 330* 393* 196* 97 69 74 147* 58* 83     Results from last 7 days   Lab Units 08/16/22  0536 08/15/22  2203   GLUCOSE RANDOM mg/dL 198* 67     BETA-HYDROXYBUTYRATE   Date Value Ref Range Status   06/15/2019 0 2 <0 6 mmol/L Final      Results from last 7 days   Lab Units 08/15/22  2203   PH MAYE  7 228*   PCO2 MAYE mm Hg 75 2*   PO2 MAYE mm Hg 31 0*   HCO3 MAYE mmol/L 30 6*   BASE EXC MAYE mmol/L 0 7   O2 CONTENT MAYE ml/dL 10 4   O2 HGB, VENOUS % 49 9*     Results from last 7 days   Lab Units 08/15/22  2201   PH, MAYE I-STAT  7 287*   PCO2, MAYE ISTAT mm HG 69 2*   PO2, MAYE ISTAT mm HG 21 0*   HCO3, MAYE ISTAT mmol/L 33 0*   I STAT BASE EXC mmol/L 4*   I STAT O2 SAT % 26*     Results from last 7 days   Lab Units 08/15/22  2203   CK TOTAL U/L 87     Results from last 7 days   Lab Units 08/16/22  0051 08/15/22  2203   HS TNI 0HR ng/L  --  13   HS TNI 2HR ng/L 12  --    HSTNI D2 ng/L -1  --      Results from last 7 days   Lab Units 08/15/22  2203   PROTIME seconds 12 8   INR  0 94   PTT seconds 26     Results from last 7 days   Lab Units 08/16/22  0536   TSH 3RD GENERATON uIU/mL 0 539     Results from last 7 days   Lab Units 08/15/22  2203   PROCALCITONIN ng/ml 0 11     Results from last 7 days   Lab Units 08/16/22  0050 08/15/22  2203   LACTIC ACID mmol/L 1 8 2 2*     Results from last 7 days   Lab Units 08/15/22  2217   CLARITY UA  Clear   COLOR UA  Light Yellow   SPEC GRAV UA  1 011   PH UA  5 5   GLUCOSE UA mg/dl Negative   KETONES UA mg/dl Negative   BLOOD UA  Trace*   PROTEIN UA mg/dl 100 (2+)*   NITRITE UA  Negative   BILIRUBIN UA  Negative   UROBILINOGEN UA (BE) mg/dl <2 0   LEUKOCYTES UA  Negative   WBC UA /hpf 1-2   RBC UA /hpf 1-2   BACTERIA UA /hpf None Seen   EPITHELIAL CELLS WET PREP /hpf Occasional     Results from last 7 days   Lab Units 08/15/22  2203   BLOOD CULTURE  No Growth at 24 hrs  No Growth at 24 hrs       ED Treatment:   Medication Administration from 08/15/2022 2152 to 08/16/2022 2536       Date/Time Order Dose Route Action     08/15/2022 2226 dextrose 5 % and sodium chloride 0 9 % infusion 125 mL/hr Intravenous New Bag     08/15/2022 2253 dextrose 50 % IV solution 50 mL 50 mL Intravenous Given     08/16/2022 0141 dextrose 10 % and normal saline infusion 75 mL/hr Intravenous New Bag     08/16/2022 0137 cefepime (MAXIPIME) 2 g/50 mL dextrose IVPB 2,000 mg Intravenous New Bag     08/16/2022 0529 heparin (porcine) subcutaneous injection 5,000 Units 5,000 Units Subcutaneous Given     08/16/2022 0449 iohexol (OMNIPAQUE) 350 MG/ML injection (SINGLE-DOSE) 100 mL 85 mL Intravenous Given        Past Medical History:   Diagnosis Date    Abdominal pain     [facility]/2018    Cardiac disease     [facility]     [facility], [facility]     Coronary artery disease     DDD (degenerative disc disease), lumbar 9/1/2020    Diabetes mellitus ([facility] )     Dyspnea     GERD (gastroesophageal reflux disease)     Hyperlipidemia     Hypertension     MI (myocardial infarction) ([facility] )     with 3 stents    Nodule of apex of right lung     JACQUELINE (obstructive sleep apnea)     Prostate cancer ([facility] )      Present on Admission:   Acute metabolic encephalopathy   [facility] severe ([facility] )   CKD (chronic kidney disease) stage 3, GFR 30-59 ml/min ([facility])   Essential hypertension   Hypothermia      Admitting Diagnosis: Altered mental status [R41 82]  Hypoglycemia [E16 2]  Elevated lactic acid level [R79 89]  Type 2 diabetes mellitus with hypoglycemia without coma, with long-term current use of insulin ([facility] ) [E11 649, Z79 4]  Age/Sex: 78 y o  male  Admission Orders:  NPO > CV diet with fld restriction  Aspiration precautions  Activity as tolerated / Up with assistance / Ambulate  Consult PT/OT  Apply warming blankets  Insert castro  Patrick SCDs        Medications 08/15 08/16   aspirin (ECOTRIN LOW STRENGTH) EC tablet 81 mg  Dose: 81 mg  Freq: Daily Route: PO  Start: 08/16/22 0900 End: 08/16/22 2029   Admin Instructions:   Do Not Crush - Enteric coated  1000     2029-D/C'd      carvedilol (COREG) tablet 6 25 mg  Dose: 6 25 mg  Freq: 2 times daily with meals Route: PO  Start: 08/16/22 0730 End: 08/16/22 2029   Admin Instructions:   Hold for heart rate less than 50 beats per minute  Order specific questions:   Hold for systolic blood pressure less than (mmHg) 110     1000 [C]     1706 [C]     2029-D/C'd      cefepime (MAXIPIME) 2 g/50 mL dextrose IVPB  Dose: 2,000 mg  Freq: Once Route: IV  Last Dose: Stopped (08/16/22 0207)  Start: 08/16/22 0115 End: 08/16/22 0207   Admin Instructions:   Refrigerate   Order specific questions:   Indication: Pneumonia     0137     0207        cyanocobalamin injection 1,000 mcg  Dose: 1,000 mcg  Freq: Daily Route: IM  Start: 08/16/22 1030 End: 08/16/22 2029     1218     2029-D/C'd      dextrose 50 % IV solution 50 mL  Dose: 50 mL  Freq:  Once Route: IV  Start: 08/15/22 2300 End: 08/15/22 2253    2253         ferrous sulfate tablet 325 mg  Dose: 325 mg  Freq: Daily with breakfast Route: PO  Start: 08/16/22 0730 End: 08/16/22 2029   Admin Instructions:   Food and drug interaction, teaching and documentation must be done; Administer 2 hours before or 4 hours after antacids; Avoid administration w/ cereals,dietary fiber,tea,coffee,eggs,or milk  1000     2029-D/C'd      furosemide (LASIX) tablet 40 mg  Dose: 40 mg  Freq: Daily Route: PO  Start: 08/16/22 0900 End: 08/16/22 2029   Admin Instructions:   LOOK ALIKE SOUND ALIKE MED   Order specific questions:   Hold for systolic blood pressure less than (mmHg) 110     1000 [C]     2029-D/C'd      gabapentin (NEURONTIN) capsule 100 mg  Dose: 100 mg  Freq: 3 times daily Route: PO  Start: 08/16/22 0900 End: 08/16/22 2029   Admin Instructions:   LOOK ALIKE SOUND ALIKE MED     1000     1505     2029-D/C'd      heparin (porcine) subcutaneous injection 5,000 Units  Dose: 5,000 Units  Freq: Every 8 hours scheduled Route: SC  Start: 08/16/22 0600 End: 08/16/22 2029   Admin Instructions:   Check for allergies to pork or pork derivatives/dietary restrictions before administration  High alert medication  [place]     0529     1341     2029-D/C'd      insulin aspart protamine-insulin aspart (NovoLOG 70/30) 100 units/mL subcutaneous injection 45 Units  Dose: 45 Units  Freq: Every 8 hours Route: SC  Start: 08/16/22 1500 End: 08/16/22 2029   Admin Instructions:   HIGH ALERT MEDICATION  Vial should be gently rolled between the palms ten times to resuspend insulin prior to injection *DISPOSE IN 8 GALLON BLACK CONTAINER* LOOK/SOUND ALIKE MED     1505     2029-D/C'd      insulin lispro (HumaLOG) 100 units/mL subcutaneous injection 1-5 Units  Dose: 1-5 Units  Freq: Daily at bedtime Route: SC  Start: 08/16/22 2200 End: 08/16/22 2029   Admin Instructions:   Algorithm 2    Blood Glucose 150 - 209: 1 Unit of Insulin  Blood Glucose 210 - 269: 2 Units of Insulin  Blood Glucose 270 - 329: 3 Units of Insulin  Blood Glucose 330 - 389: 4 Units of Insulin  Blood Glucose greater than or equal to 390: 5 Units of Insulin  HIGH ALERT MEDICATION  **DISPOSE IN 8 GALLON BLACK CONTAINER**   LOOK ALIKE SOUND ALIKE MED     2029-D/C'd       insulin lispro (HumaLOG) 100 units/mL subcutaneous injection 1-6 Units  Dose: 1-6 Units  Freq: 3 times daily before meals Route: SC  Start: 08/16/22 0700 End: 08/16/22 2029   Admin Instructions:   Algorithm 3    Blood Glucose 150 - 189: 1 Unit of Insulin  Blood Glucose 190 - 229: 2 Units of Insulin  Blood Glucose 230 - 269: 3 Units of Insulin  Blood Glucose 270 - 309: 4 Units of Insulin  Blood Glucose 310 - 349: 5 Units of Insulin  Blood Glucose greater than or equal to 350: 6 Units of Insulin  HIGH ALERT MEDICATION  **DISPOSE IN 8 GALLON BLACK CONTAINER**  [employer]     ([id number]) [C]     8746 [C]     1728 [C]     2029-D/C'd       insulin lispro (HumaLOG) 100 units/mL subcutaneous injection 1-6 Units  Dose: 1-6 Units  Freq: Every 6 hours scheduled Route: SC  Start: 08/16/22 0230 End: 08/16/22 0517   Admin Instructions:   Algorithm 3    Blood Glucose 150 - 189: 1 Unit of Insulin  Blood Glucose 190 - 229: 2 Units of Insulin  Blood Glucose 230 - 269: 3 Units of Insulin  Blood Glucose 270 - 309: 4 Units of Insulin  Blood Glucose 310 - 349: 5 Units of Insulin  Blood Glucose greater than or equal to 350: 6 Units of Insulin  HIGH ALERT MEDICATION  **DISPOSE IN 8 GALLON BLACK CONTAINER**   [facility]     ([id number]) 1350-D/C'd      ipratropium (ATROVENT) 0 02 % inhalation solution 0 5 mg  Dose: 0 5 mg  Freq: 3 times daily (RESP) Route: NEBULIZATION  Start: 08/16/22 0845 End: 08/16/22 2029     0859     1350     2029-D/C'd      ipratropium-albuterol (DUO-NEB) 0 5-2 5 mg/3 mL inhalation solution 3 mL  Dose: 3 mL  Freq: 3 times daily Route: NEBULIZATION  Start: 08/16/22 0900 End: 08/16/22 0835     0835-D/C'd      levalbuterol (XOPENEX) inhalation solution 1 25 mg  Dose: 1 25 mg  Freq: 3 times daily (RESP) Route: NEBULIZATION  Start: 08/16/22 0845 End: 08/16/22 2029 0859     1350 2029-D/C'd      lisinopril (ZESTRIL) tablet 10 mg  Dose: 10 mg  Freq: Daily Route: PO  Start: 08/16/22 0900 End: 08/16/22 2029   Admin Instructions:   LOOK ALIKE SOUND ALIKE MED   Order specific questions:   Hold for systolic blood pressure less than (mmHg) 130     1000     2029-D/C'd      pravastatin (PRAVACHOL) tablet 80 mg  Dose: 80 mg  Freq: Daily with dinner Route: PO  Start: 08/16/22 1630 End: 08/16/22 2029 1706 2029-D/C'd      tamsulosin (FLOMAX) capsule 0 4 mg  Dose: 0 4 mg  Freq: Daily with dinner Route: PO  Start: 08/16/22 1630 End: 08/16/22 2029   Admin Instructions:   Swallow whole; do not crush, chew, or open     1706 2029-D/C'd      vancomycin (VANCOCIN) 2,000 mg in sodium chloride 0 9 % 500 mL IVPB  Dose: 20 mg/kg  Weight Dosing Info: 99 8 kg  Freq: Once Route: IV  Start: 08/16/22 0115 End: 08/16/22 0116   Admin Instructions: Fastest infusion time = 30min for every 500mg administered; decrease rate if Red Man Syndrome occurs  Refrigerate if in solution  Order specific questions:   Indication: methicillin-resistant staphylococcus aureus     0116-D/C'd  (0157)        Medications 08/15 08/16           Continuous Meds Sorted by Name  Medications 08/15 08/16   dextrose 10 % and normal saline infusion  Rate: 75 mL/hr Dose: 75 mL/hr  Freq: Continuous Route: IV  Last Dose: Stopped (08/16/22 1149)  Start: 08/16/22 0030 End: 08/16/22 1211     0141     1149     1211-D/C'd      dextrose 5 % and sodium chloride 0 9 % infusion  Rate: 200 mL/hr Dose: 200 mL/hr  Freq: Continuous Route: IV  Indications of Use: IV Hydration  Last Dose: Stopped (08/16/22 0144)  Start: 08/15/22 2215 End: 08/16/22 0210 2226     2250      0144     0210-D/C'd              PRN Meds Sorted by Name  Medications 08/15 08/16   albuterol inhalation solution 2 5 mg  Dose: 2 5 mg  Freq: Every 4 hours PRN Route: NEBULIZATION  PRN Reasons: wheezing,shortness of breath  Indications of Use: BRONCHOSPASM  Start: 08/16/22 0920 End: 08/16/22 2029 2029-D/C'd      iohexol (OMNIPAQUE) 350 MG/ML injection (SINGLE-DOSE) 100 mL  Dose: 100 mL  Freq:  Once in imaging Route: IV  PRN Reason: contrast  Start: 08/16/22 0448 End: 08/16/22 0449 0449        ondansetron (ZOFRAN) injection 4 mg  Dose: 4 mg  Freq: Every 6 hours PRN Route: IV  PRN Reasons: nausea,vomiting  Start: 08/16/22 0226 End: 08/16/22 2029   Admin Instructions:   Push over 2 minutes  2029-D/C'd      oxyCODONE (ROXICODONE) IR tablet 7 5 mg  Dose: 7 5 mg  Freq: 2 times daily PRN Route: PO  PRN Reason: severe pain  Start: 08/16/22 0515 End: 08/16/22 2029   Admin Instructions:   High alert medication  LOOK ALIKE SOUND ALIKE MED     2029-D/C'd             IP CONSULT TO ENDOCRINOLOGY    Network Utilization Review Department  ATTENTION: Please call with any questions or concerns to [phone] and carefully listen to the prompts so that you are directed to the right person  All voicemails are confidential   Esteban Bell all requests for admission clinical reviews, approved or denied determinations and any other requests to dedicated fax number below belonging to the campus where the patient is receiving treatment   List of dedicated fax numbers for the Facilities:  [address] DENIALS (Administrative/Medical Necessity) [phone]   [address] (Maternity/NICU/Pediatrics) [phone]   [address]  [address] [facility] [place] [address] [address] [place] [address] [address] [facility] [place] [address] [phone]

## 2022-08-17 NOTE — TELEPHONE ENCOUNTER
LM on our VM:  Artem Charter called stating he is just out of the hospital & needs "all meds refilled"

## 2022-08-17 NOTE — UTILIZATION REVIEW
Notification of Discharge   This is a Notification of Discharge from our facility [address]  Please be advised that this patient has been discharge from our facility  Below you will find the admission and discharge date and time including the patients disposition  UTILIZATION REVIEW CONTACT:  Raghu [place]  Utilization   Network Utilization Review Department  Phone: [phone] x carefully listen to the prompts  All voicemails are confidential   Email: [email or web address]     PHYSICIAN ADVISORY SERVICES:  FOR [id number] REVIEW - MEDICAL NECESSITY DENIAL  Phone: [phone]  Fax: [phone]  Email: [email or web address]     PRESENTATION DATE: 8/15/2022  9:56 PM  OBERVATION ADMISSION DATE:   INPATIENT ADMISSION DATE: 8/16/22  1:16 AM   DISCHARGE DATE: 8/16/2022  6:29 PM  DISPOSITION: Home/Self Care Home/Self Care      IMPORTANT INFORMATION:  Send all requests for admission clinical reviews, approved or denied determinations and any other requests to dedicated fax number below belonging to the campus where the patient is receiving treatment   List of dedicated fax numbers:  [address] DENIALS (Administrative/Medical Necessity) [phone]   [address] (Maternity/NICU/Pediatrics) [phone]   Kelly Baptise [phone]   [address] [facility] [phone]   [address] [place] [address] [phone]   [address] [place] [address],[address] [phone]   [facility]  [phone]   [address] [place] [address], [address]  [address] [facility] 1000 [address] [facility] [phone]

## 2022-08-17 NOTE — UTILIZATION REVIEW
Initial Clinical Review    Admission: Date/Time/Statement:   Admission Orders (From admission, onward)     Ordered        08/16/22 0116  Inpatient Admission                        Orders Placed This Encounter   Procedures    Inpatient Admission     Standing Status:   Standing     Number of Occurrences:   1     Order Specific Question:   Level of Care     Answer:   Level 2 Stepdown / HOT [14]     Order Specific Question:   Estimated length of stay     Answer:   More than 2 Midnights     Order Specific Question:   Certification     Answer:   I certify that inpatient services are medically necessary for this patient for a duration of greater than two midnights  See H&P and MD Progress Notes for additional information about the patient's course of treatment  ED Arrival Information     Expected   -    Arrival   8/15/2022 21:52    Acuity   Emergent            Means of arrival   Ambulance    Escorted by   [facility]    Service   Hospitalist    Admission type   Emergency            Arrival complaint   unresponsive            Chief Complaint   Patient presents with    Hypoglycemia - Symptomatic     Found by son down between 10 min and 3 hrs  BBG 23 recoveredd to 212 after 25g dextrose  Pt alert yet still not responsive  Initial Presentation: 78 y o  male with a pmh  significant for type 2 diabetes on intensive insulin therapy, COPD, chronic diastolic heart failure, chronic respiratory failure requiring 2 L NC continuously, hypertension, chronic kidney disease stage 3; notably has had multiple admissions at this hospital this calendar year for exacerbations of his multiple comorbidities  He was found by his son at home unresponsive  EMS called blood sugar was 23 - he received 25 g D50 with Glucose increased to 212 without improvement in mental status  In the ED he was following some commands but not speaking  Blood Glucose dropped requiring further D50 then D5 gtt but blood glucose continued to drop  Labs showed lactic acidosis, CT head was negative  He is admitted inpatient due to ongoing hypoglycemia and altered mental status  Endocrine Consult - 11/12 - 72 yo m with DM2 admitted with severe hypoglycemia  He has had DM for many years and is on U500 regular insulin at home  His lat A1c was 10%  The etiology of hypoglycemia is unknown  His DM is complicated by neuropathy, CKD and retinopathy  On exam he is A & O x 3, he is now off D10  His blood sugars are running high  Resume SC insulin with Novolog 70/30 -45 units q8 first dose at 3 pm  Continue to monitor and adjust as necessary  8/16:  5 pm note -  Pt was discharged to home,  He presented with acute mental status change and hypoglycemia  He was given IV dextrose until insulin effects wore off  He was seen by endocrine and his insulin regimen was changed to mixed insulin  Mental status returned to baseline  B12 was low and he was given a B12 injection       ED Triage Vitals   Temperature Pulse Respirations Blood Pressure SpO2   08/15/22 2200 08/15/22 2200 08/15/22 2200 08/15/22 2200 08/15/22 2200   (!) 92 8 °F (33 8 °C) 62 21 137/66 99 %      Temp Source Heart Rate Source Patient Position - Orthostatic VS BP Location FiO2 (%)   08/15/22 2200 08/15/22 2200 08/15/22 2200 08/15/22 2200 --   Rectal Monitor Held Right arm       Pain Score       08/16/22 0725       No Pain          Wt Readings from Last 1 Encounters:   08/16/22 100 kg (220 lb 7 4 oz)     Additional Vital Signs:   Date/Time Temp Pulse Resp BP MAP (mmHg) SpO2 O2 Device Patient Position - Orthostatic VS   08/16/22 17:07:43 -- -- -- 154/75 101 -- -- --   08/16/22 15:33:44 -- -- 17 153/75 101 -- -- --   08/16/22 1350 -- -- -- -- -- 93 % -- --   08/16/22 10:01:44 -- 84 -- 133/69 90 93 % -- --   08/16/22 1000 -- -- -- 133/69 -- -- -- --   08/16/22 0859 -- -- -- -- -- 93 % -- --   08/16/22 0545 97 9 °F (36 6 °C) 96 -- 149/75 -- 93 % None (Room air) --   08/16/22 0500 97 9 °F (36 6 °C) 90 -- -- -- 97 % None (Room air) --   08/16/22 0445 -- 96 -- 163/76 102 -- -- --   08/16/22 0415 97 8 °F (36 6 °C) 86 -- 129/68 93 94 % -- --   08/16/22 0400 -- 98 -- 136/67 96 -- -- --   08/16/22 0345 -- 84 -- 138/65 89 -- -- --   08/16/22 0330 97 3 °F (36 3 °C) Abnormal  80 -- 119/64 87 94 % -- --   08/16/22 0315 96 9 °F (36 1 °C) Abnormal  78 -- 115/65 85 94 % -- --   08/16/22 0300 -- 80 -- 123/59 85 94 % -- --   08/16/22 0245 95 5 °F (35 3 °C) Abnormal  82 -- 128/65 90 96 % -- --   08/16/22 0230 95 3 °F (35 2 °C) Abnormal  78 -- 114/58 80 94 % -- --   08/16/22 0200 -- 70 -- 128/69 91 93 % -- --   08/16/22 0145 95 °F (35 °C) Abnormal  64 18 126/62 89 95 % None (Room air) Lying   08/16/22 0130 -- 66 -- 140/62 91 95 % -- --   08/16/22 0115 -- 62 -- 144/70 100 96 % -- --   08/16/22 0109 94 6 °F (34 8 °C) Abnormal  -- -- -- -- -- -- --   08/16/22 0100 94 6 °F (34 8 °C) Abnormal  76 -- -- -- 95 % -- --   08/16/22 0045 92 1 °F (33 4 °C) Abnormal  60 20 129/70 94 96 % -- --   08/16/22 0030 93 9 °F (34 4 °C) Abnormal  62 18 -- -- 96 % -- --   08/16/22 0015 94 3 °F (34 6 °C) Abnormal  60 25 Abnormal  -- -- 97 % -- --   08/16/22 0000 93 9 °F (34 4 °C) Abnormal  60 22 -- -- 96 % -- --   08/15/22 2345 93 2 °F (34 °C) Abnormal  60 18 -- -- 97 % -- --   08/15/22 2330 -- 60 20 109/57 78 98 % -- --   08/15/22 2315 93 6 °F (34 2 °C) Abnormal  60 13 116/59 84 97 % -- --   08/15/22 2305 -- 60 16 118/56 80 97 % -- --   08/15/22 2300 93 6 °F (34 2 °C) Abnormal  60 20 104/58 78 98 % -- --   08/15/22 2245 93 6 °F (34 2 °C) Abnormal  60 23 Abnormal  141/73 100 99 % -- --   08/15/22 2230 93 9 °F (34 4 °C) Abnormal  60 14 133/64 92 97 % -- --   08/15/22 2222 93 56 °F (34 2 °C) Abnormal  60 24 Abnormal  145/65 93 100 % None (Room air) --   08/15/22 2215 -- 60 24 Abnormal  146/65 94 100 % --                Pertinent Labs/Diagnostic Test Results:   CT abdomen pelvis w contrast   Final Result by Felipa Laguna MD (08/16 0921)      Focal left upper lobe small bowel ileus  No evidence for small bowel obstruction  No transition point  The remainder of the visualized bowel is unremarkable  Unchanged right lower lobe findings as above  Workstation performed: [id number]         XR chest 1 view portable   ED Interpretation by Mary Landon DO (08/16 0108)   Complex effusion on right side      Final Result by Waleska Cameron MD (08/16 1041)      Pleural-parenchymal density at the right lung base has mildly worsened as compared to the studies of June and April  Increased effusion and/or new pneumonia could be present superimposed on more chronic changes noted previously  Follow-up is advised      There is continued volume loss in the right hemithorax  The left lung appears clear  The study was marked in [facility] for immediate notification  Workstation performed: [id number]         CT head without contrast   Final Result by Cory Love MD (08/16 1209)      No acute intracranial hemorrhage, midline shift, or mass effect  Workstation performed: [id number]             ECG 8/16 -  NSR - RBBB - Rate 69  Collection Time Result Time Vent R Atrial R OK Int  QRSD Int  QT Int  QTC Int   P Axis QRS Axis T Wave Ax    08/15/22 22:00:41 08/16/22 08:31:04 68 69  154 448 476  211 6           Results from last 7 days   Lab Units 08/16/22  0536 08/15/22  2203 08/15/22  2201   WBC Thousand/uL 11 45* 8 87  --    HEMOGLOBIN g/dL 11 7* 14 2  --    I STAT HEMOGLOBIN g/dl  --   --  13 9   HEMATOCRIT % 37 2 45 2  --    HEMATOCRIT, ISTAT %  --   --  41   PLATELETS Thousands/uL 119* 141*  --    NEUTROS ABS Thousands/µL  --  7 61  --          Results from last 7 days   Lab Units 08/16/22  0536 08/15/22  2203 08/15/22  2201   SODIUM mmol/L 137 138  --    POTASSIUM mmol/L 3 8 3 7  --    CHLORIDE mmol/L 105 106  --    CO2 mmol/L 27 31  --    CO2, I-STAT mmol/L  --   --  35*   ANION GAP mmol/L 5 1*  --    BUN mg/dL 37* 38*  --    CREATININE mg/dL 1 48* 1 63*  --    EGFR ml/min/1 73sq m 45 40  --    CALCIUM mg/dL 7 5* 8 2*  --    CALCIUM, IONIZED, ISTAT mmol/L  --   --  1 07*     Results from last 7 days   Lab Units 08/15/22  2203   AST U/L 21   ALT U/L 18   ALK PHOS U/L 88   TOTAL PROTEIN g/dL 8 1   ALBUMIN g/dL 3 2*   TOTAL BILIRUBIN mg/dL 0 31     Results from last 7 days   Lab Units 08/16/22  1555 08/16/22  1504 08/16/22  1142 08/16/22  0528 08/16/22  0256 08/16/22  0112 08/16/22  0048 08/15/22  2326 08/15/22  2247 08/15/22  2156   POC GLUCOSE mg/dl 297* 330* 393* 196* 97 69 74 147* 58* 83     Results from last 7 days   Lab Units 08/16/22  0536 08/15/22  2203   GLUCOSE RANDOM mg/dL 198* 67             BETA-HYDROXYBUTYRATE   Date Value Ref Range Status   06/15/2019 0 2 <0 6 mmol/L Final          Results from last 7 days   Lab Units 08/15/22  2203   PH MAYE  7 228*   PCO2 MAYE mm Hg 75 2*   PO2 MAYE mm Hg 31 0*   HCO3 MAYE mmol/L 30 6*   BASE EXC MAYE mmol/L 0 7   O2 CONTENT MAYE ml/dL 10 4   O2 HGB, VENOUS % 49 9*     Results from last 7 days   Lab Units 08/15/22  2201   PH, MAYE I-STAT  7 287*   PCO2, MAYE ISTAT mm HG 69 2*   PO2, MAYE ISTAT mm HG 21 0*   HCO3, MAYE ISTAT mmol/L 33 0*   I STAT BASE EXC mmol/L 4*   I STAT O2 SAT % 26*     Results from last 7 days   Lab Units 08/15/22  2203   CK TOTAL U/L 87     Results from last 7 days   Lab Units 08/16/22  0051 08/15/22  2203   HS TNI 0HR ng/L  --  13   HS TNI 2HR ng/L 12  --    HSTNI D2 ng/L -1  --          Results from last 7 days   Lab Units 08/15/22  2203   PROTIME seconds 12 8   INR  0 94   PTT seconds 26     Results from last 7 days   Lab Units 08/16/22  0536   TSH 3RD GENERATON uIU/mL 0 539     Results from last 7 days   Lab Units 08/15/22  2203   PROCALCITONIN ng/ml 0 11     Results from last 7 days   Lab Units 08/16/22  0050 08/15/22  2203   LACTIC ACID mmol/L 1 8 2 2*       Results from last 7 days   Lab Units 08/15/22  2217   CLARITY UA  Clear   COLOR UA  Light Yellow   SPEC GRAV UA  1 011   PH UA  5 5 GLUCOSE UA mg/dl Negative   KETONES UA mg/dl Negative   BLOOD UA  Trace*   PROTEIN UA mg/dl 100 (2+)*   NITRITE UA  Negative   BILIRUBIN UA  Negative   UROBILINOGEN UA (BE) mg/dl <2 0   LEUKOCYTES UA  Negative   WBC UA /hpf 1-2   RBC UA /hpf 1-2   BACTERIA UA /hpf None Seen   EPITHELIAL CELLS WET PREP /hpf Occasional       Results from last 7 days   Lab Units 08/15/22  2203   BLOOD CULTURE  No Growth at 24 hrs  No Growth at 24 hrs           ED Treatment:   Medication Administration from 08/15/2022 2152 to 08/16/2022 8507       Date/Time Order Dose Route Action Comments     08/15/2022 2250 dextrose 5 % and sodium chloride 0 9 % infusion 200 mL/hr Intravenous Rate/Dose Change      08/15/2022 2226 dextrose 5 % and sodium chloride 0 9 % infusion 125 mL/hr Intravenous New Bag      08/15/2022 2253 dextrose 50 % IV solution 50 mL 50 mL Intravenous Given      08/16/2022 0141 dextrose 10 % and normal saline infusion 75 mL/hr Intravenous New Bag      08/16/2022 0137 cefepime (MAXIPIME) 2 g/50 mL dextrose IVPB 2,000 mg Intravenous New Bag      08/16/2022 0529 heparin (porcine) subcutaneous injection 5,000 Units 5,000 Units Subcutaneous Given      08/16/2022 0449 iohexol (OMNIPAQUE) 350 MG/ML injection (SINGLE-DOSE) 100 mL 85 mL Intravenous Given         Past Medical History:   Diagnosis Date    Abdominal pain     [facility] 6/19/2018    Cardiac disease     [facility]     COPD, severe ([facility] )     Coronary artery disease     DDD (degenerative disc disease), lumbar 9/1/2020    Diabetes mellitus ([facility] )     Dyspnea     GERD (gastroesophageal reflux disease)     Hyperlipidemia     Hypertension     MI (myocardial infarction) ([facility] )     with 3 stents    Nodule of apex of right lung     JACQUELINE (obstructive sleep apnea)     Prostate cancer ([facility] )      Present on Admission:   Acute metabolic encephalopathy   COPD, severe (HCC)   CKD (chronic kidney disease) stage 3, GFR 30-59 ml/min ([facility]   Essential hypertension   Hypothermia      Admitting Diagnosis: Altered mental status [R41 82]  Hypoglycemia [E16 2]  Elevated lactic acid level [R79 89]  Type 2 diabetes mellitus with hypoglycemia without coma, with long-term current use of insulin (HCC) [E11 649, Z79 4]  Age/Sex: 78 y o  male       Admission Orders:  Scheduled Medications:     Coreg 6 25mg po bid  ASA 81 mg po qd  Cyanocobalamin 1000 mcg Im qd  Ferrous sulfate 325 mg po qd  Lasix 40 mg po qd  Neurontin 100 mg po tid  Heparin 5000 units sc q8  Novolog 70/30 - 45 units sc q8  Humalog 1-5 units SC daily at bedtime  Humalog 1-5 units sc daily before meals  Atrovent 0 02% Neb tid  Duo-Neb - 0 5-2 5mg/3ml inhl solution - Neb tid  Xopenex 1 25mg Neb tid  Lisinopril 10 mg po qd  Pravastatin 80 mg po with dinner  Flomax 0 4mg po with dinner      Continuous IV Infusions:  dextrose 5 % and sodium chloride 0 9 % infusion  Rate: 200 mL/hr Dose: 200 mL/hr  Freq: Continuous Route: IV  Indications of Use: IV Hydration  Last Dose: Stopped (08/16/22 0144)  Start: 08/15/22 2215 End: 08/16/22 0210    dextrose 10 % and normal saline infusion  Rate: 75 mL/hr Dose: 75 mL/hr  Freq: Continuous Route: IV  Last Dose: Stopped (08/16/22 1149)  Start: 08/16/22 0030 End: 08/16/22 1211      PRN Meds:  Albuterol 2 5 mg q4 prn  Zofran 4mg Iv prn  Oxycodone 7 5mg po prn       Nursing Orders - neuro checks - SCD's to le's - PT/OT evaluation - up with assistance - Aspiration precautions -         Network Utilization Review Department  ATTENTION: Please call with any questions or concerns to [phone] and carefully listen to the prompts so that you are directed to the right person  All voicemails are confidential   Josesito Boyd all requests for admission clinical reviews, approved or denied determinations and any other requests to dedicated fax number below belonging to the campus where the patient is receiving treatment   List of dedicated fax numbers for the Facilities:  FACILITY NAME UR FAX NUMBER   ADMISSION DENIALS (Administrative/Medical Necessity) [phone]   [address] (Maternity/NICU/Pediatrics) 261 [facility] [address] [place] [address]  [phone]   Mariola Ramsay [address] [place] [id number] [address] [place] [address] [phone]

## 2022-08-18 ENCOUNTER — TRANSITIONAL CARE MANAGEMENT (OUTPATIENT)
Dept: FAMILY MEDICINE CLINIC | Facility: CLINIC | Age: 77
End: 2022-08-18

## 2022-08-19 NOTE — TELEPHONE ENCOUNTER
Assessment & Plan     1. Encounter to establish care    2. Chronic left-sided low back pain with left-sided sciatica  - MR Lumbar Spine w/o Contrast    3. Anxiety  - Vitamin D Deficiency    4. Family history of ischemic heart disease  - Lipid Profile  - Basic metabolic panel    5. Family history of thyroid disease  - TSH with free T4 reflex     Tobacco Cessation:   reports that he has been smoking.  He has a 23.00 pack-year smoking history. He has quit using smokeless tobacco.  Tobacco Cessation Action Plan: Information offered: Patient not interested at this time        FUTURE APPOINTMENTS:       - Follow-up visit as needed       Socorro Benitez NP  [facility]         Needs ov

## 2022-08-21 ENCOUNTER — HOSPITAL ENCOUNTER (EMERGENCY)
Facility: HOSPITAL | Age: 77
Discharge: HOME | End: 2022-08-21
Attending: EMERGENCY MEDICINE
Payer: COMMERCIAL

## 2022-08-21 ENCOUNTER — APPOINTMENT (OUTPATIENT)
Dept: RADIOLOGY | Facility: HOSPITAL | Age: 77
End: 2022-08-21
Payer: COMMERCIAL

## 2022-08-21 ENCOUNTER — HOSPITAL ENCOUNTER (OUTPATIENT)
Facility: HOSPITAL | Age: 77
Setting detail: OBSERVATION
Discharge: LEFT BEFORE DISCHARGE | End: 2022-08-22
Attending: EMERGENCY MEDICINE | Admitting: INTERNAL MEDICINE
Payer: COMMERCIAL

## 2022-08-21 VITALS
SYSTOLIC BLOOD PRESSURE: 176 MMHG | HEART RATE: 97 BPM | TEMPERATURE: 98.2 F | RESPIRATION RATE: 17 BRPM | OXYGEN SATURATION: 96 % | DIASTOLIC BLOOD PRESSURE: 79 MMHG

## 2022-08-21 VITALS
WEIGHT: 220 LBS | RESPIRATION RATE: 22 BRPM | HEART RATE: 80 BPM | SYSTOLIC BLOOD PRESSURE: 135 MMHG | TEMPERATURE: 98.1 F | DIASTOLIC BLOOD PRESSURE: 77 MMHG | OXYGEN SATURATION: 98 % | HEIGHT: 72 IN | BODY MASS INDEX: 29.8 KG/M2

## 2022-08-21 DIAGNOSIS — Z79.899 MEDICATION MANAGEMENT: ICD-10-CM

## 2022-08-21 DIAGNOSIS — R07.89 MUSCULOSKELETAL CHEST PAIN: Primary | ICD-10-CM

## 2022-08-21 DIAGNOSIS — R07.9 CHEST PAIN, UNSPECIFIED: Primary | ICD-10-CM

## 2022-08-21 DIAGNOSIS — E11.65 TYPE 2 DIABETES MELLITUS WITH HYPERGLYCEMIA, WITH LONG-TERM CURRENT USE OF INSULIN (HCC): Chronic | ICD-10-CM

## 2022-08-21 DIAGNOSIS — Z79.4 TYPE 2 DIABETES MELLITUS WITH HYPERGLYCEMIA, WITH LONG-TERM CURRENT USE OF INSULIN (HCC): Chronic | ICD-10-CM

## 2022-08-21 DIAGNOSIS — E11.9 DIABETES (HCC): ICD-10-CM

## 2022-08-21 LAB
2HR DELTA HS TROPONIN: 1 NG/L
ALBUMIN SERPL BCP-MCNC: 2.7 G/DL (ref 3.5–5)
ALP SERPL-CCNC: 75 U/L (ref 46–116)
ALT SERPL W P-5'-P-CCNC: 17 U/L (ref 12–78)
ANION GAP SERPL CALCULATED.3IONS-SCNC: 1 MMOL/L (ref 4–13)
ANION GAP SERPL CALCULATED.3IONS-SCNC: 3 MMOL/L (ref 4–13)
AST SERPL W P-5'-P-CCNC: 19 U/L (ref 5–45)
BACTERIA BLD CULT: NORMAL
BACTERIA BLD CULT: NORMAL
BASOPHILS # BLD AUTO: 0.02 THOUSANDS/ΜL (ref 0–0.1)
BASOPHILS # BLD AUTO: 0.03 THOUSANDS/ΜL (ref 0–0.1)
BASOPHILS NFR BLD AUTO: 0 % (ref 0–1)
BASOPHILS NFR BLD AUTO: 0 % (ref 0–1)
BILIRUB SERPL-MCNC: 0.12 MG/DL (ref 0.2–1)
BUN SERPL-MCNC: 21 MG/DL (ref 5–25)
BUN SERPL-MCNC: 23 MG/DL (ref 5–25)
CALCIUM ALBUM COR SERPL-MCNC: 9.1 MG/DL (ref 8.3–10.1)
CALCIUM SERPL-MCNC: 8.1 MG/DL (ref 8.3–10.1)
CALCIUM SERPL-MCNC: 8.1 MG/DL (ref 8.3–10.1)
CARDIAC TROPONIN I PNL SERPL HS: 13 NG/L
CARDIAC TROPONIN I PNL SERPL HS: 14 NG/L
CARDIAC TROPONIN I PNL SERPL HS: 14 NG/L
CHLORIDE SERPL-SCNC: 104 MMOL/L (ref 96–108)
CHLORIDE SERPL-SCNC: 104 MMOL/L (ref 96–108)
CO2 SERPL-SCNC: 28 MMOL/L (ref 21–32)
CO2 SERPL-SCNC: 31 MMOL/L (ref 21–32)
CREAT SERPL-MCNC: 1.67 MG/DL (ref 0.6–1.3)
CREAT SERPL-MCNC: 1.69 MG/DL (ref 0.6–1.3)
EOSINOPHIL # BLD AUTO: 0.06 THOUSAND/ΜL (ref 0–0.61)
EOSINOPHIL # BLD AUTO: 0.06 THOUSAND/ΜL (ref 0–0.61)
EOSINOPHIL NFR BLD AUTO: 1 % (ref 0–6)
EOSINOPHIL NFR BLD AUTO: 1 % (ref 0–6)
ERYTHROCYTE [DISTWIDTH] IN BLOOD BY AUTOMATED COUNT: 16.9 % (ref 11.6–15.1)
ERYTHROCYTE [DISTWIDTH] IN BLOOD BY AUTOMATED COUNT: 16.9 % (ref 11.6–15.1)
GFR SERPL CREATININE-BSD FRML MDRD: 39 ML/MIN/1.73SQ M
GFR SERPL CREATININE-BSD FRML MDRD: 39 ML/MIN/1.73SQ M
GLUCOSE SERPL-MCNC: 355 MG/DL (ref 65–140)
GLUCOSE SERPL-MCNC: 365 MG/DL (ref 65–140)
GLUCOSE SERPL-MCNC: 375 MG/DL (ref 65–140)
HCT VFR BLD AUTO: 36.8 % (ref 36.5–49.3)
HCT VFR BLD AUTO: 39.1 % (ref 36.5–49.3)
HGB BLD-MCNC: 11.4 G/DL (ref 12–17)
HGB BLD-MCNC: 12.1 G/DL (ref 12–17)
IMM GRANULOCYTES # BLD AUTO: 0.05 THOUSAND/UL (ref 0–0.2)
IMM GRANULOCYTES # BLD AUTO: 0.06 THOUSAND/UL (ref 0–0.2)
IMM GRANULOCYTES NFR BLD AUTO: 1 % (ref 0–2)
IMM GRANULOCYTES NFR BLD AUTO: 1 % (ref 0–2)
LIPASE SERPL-CCNC: 153 U/L (ref 73–393)
LIPASE SERPL-CCNC: 168 U/L (ref 73–393)
LYMPHOCYTES # BLD AUTO: 0.72 THOUSANDS/ΜL (ref 0.6–4.47)
LYMPHOCYTES # BLD AUTO: 0.84 THOUSANDS/ΜL (ref 0.6–4.47)
LYMPHOCYTES NFR BLD AUTO: 10 % (ref 14–44)
LYMPHOCYTES NFR BLD AUTO: 8 % (ref 14–44)
MCH RBC QN AUTO: 26.7 PG (ref 26.8–34.3)
MCH RBC QN AUTO: 26.9 PG (ref 26.8–34.3)
MCHC RBC AUTO-ENTMCNC: 30.9 G/DL (ref 31.4–37.4)
MCHC RBC AUTO-ENTMCNC: 31 G/DL (ref 31.4–37.4)
MCV RBC AUTO: 86 FL (ref 82–98)
MCV RBC AUTO: 87 FL (ref 82–98)
MONOCYTES # BLD AUTO: 0.81 THOUSAND/ΜL (ref 0.17–1.22)
MONOCYTES # BLD AUTO: 0.85 THOUSAND/ΜL (ref 0.17–1.22)
MONOCYTES NFR BLD AUTO: 9 % (ref 4–12)
MONOCYTES NFR BLD AUTO: 9 % (ref 4–12)
NEUTROPHILS # BLD AUTO: 7.04 THOUSANDS/ΜL (ref 1.85–7.62)
NEUTROPHILS # BLD AUTO: 7.66 THOUSANDS/ΜL (ref 1.85–7.62)
NEUTS SEG NFR BLD AUTO: 79 % (ref 43–75)
NEUTS SEG NFR BLD AUTO: 81 % (ref 43–75)
NRBC BLD AUTO-RTO: 0 /100 WBCS
NRBC BLD AUTO-RTO: 0 /100 WBCS
NT-PROBNP SERPL-MCNC: 3143 PG/ML
PLATELET # BLD AUTO: 121 THOUSANDS/UL (ref 149–390)
PLATELET # BLD AUTO: 133 THOUSANDS/UL (ref 149–390)
PMV BLD AUTO: 11 FL (ref 8.9–12.7)
PMV BLD AUTO: 11.7 FL (ref 8.9–12.7)
POTASSIUM SERPL-SCNC: 4.2 MMOL/L (ref 3.5–5.3)
POTASSIUM SERPL-SCNC: 4.6 MMOL/L (ref 3.5–5.3)
PROT SERPL-MCNC: 7.3 G/DL (ref 6.4–8.4)
RBC # BLD AUTO: 4.27 MILLION/UL (ref 3.88–5.62)
RBC # BLD AUTO: 4.49 MILLION/UL (ref 3.88–5.62)
SODIUM SERPL-SCNC: 135 MMOL/L (ref 135–147)
SODIUM SERPL-SCNC: 136 MMOL/L (ref 135–147)
WBC # BLD AUTO: 8.83 THOUSAND/UL (ref 4.31–10.16)
WBC # BLD AUTO: 9.37 THOUSAND/UL (ref 4.31–10.16)

## 2022-08-21 PROCEDURE — 99285 EMERGENCY DEPT VISIT HI MDM: CPT

## 2022-08-21 PROCEDURE — 82948 REAGENT STRIP/BLOOD GLUCOSE: CPT

## 2022-08-21 PROCEDURE — 36415 COLL VENOUS BLD VENIPUNCTURE: CPT

## 2022-08-21 PROCEDURE — 80048 BASIC METABOLIC PNL TOTAL CA: CPT | Performed by: STUDENT IN AN ORGANIZED HEALTH CARE EDUCATION/TRAINING PROGRAM

## 2022-08-21 PROCEDURE — 83690 ASSAY OF LIPASE: CPT | Performed by: STUDENT IN AN ORGANIZED HEALTH CARE EDUCATION/TRAINING PROGRAM

## 2022-08-21 PROCEDURE — 84484 ASSAY OF TROPONIN QUANT: CPT

## 2022-08-21 PROCEDURE — 99285 EMERGENCY DEPT VISIT HI MDM: CPT | Performed by: EMERGENCY MEDICINE

## 2022-08-21 PROCEDURE — 85025 COMPLETE CBC W/AUTO DIFF WBC: CPT

## 2022-08-21 PROCEDURE — 83690 ASSAY OF LIPASE: CPT

## 2022-08-21 PROCEDURE — 83880 ASSAY OF NATRIURETIC PEPTIDE: CPT

## 2022-08-21 PROCEDURE — 93005 ELECTROCARDIOGRAM TRACING: CPT

## 2022-08-21 PROCEDURE — 99220 PR INITIAL OBSERVATION CARE/DAY 70 MINUTES: CPT | Performed by: INTERNAL MEDICINE

## 2022-08-21 PROCEDURE — 71046 X-RAY EXAM CHEST 2 VIEWS: CPT

## 2022-08-21 PROCEDURE — 85025 COMPLETE CBC W/AUTO DIFF WBC: CPT | Performed by: STUDENT IN AN ORGANIZED HEALTH CARE EDUCATION/TRAINING PROGRAM

## 2022-08-21 PROCEDURE — 84484 ASSAY OF TROPONIN QUANT: CPT | Performed by: STUDENT IN AN ORGANIZED HEALTH CARE EDUCATION/TRAINING PROGRAM

## 2022-08-21 PROCEDURE — 80053 COMPREHEN METABOLIC PANEL: CPT

## 2022-08-21 RX ORDER — CYANOCOBALAMIN 1000 UG/ML
1000 INJECTION, SOLUTION INTRAMUSCULAR; SUBCUTANEOUS
Status: DISCONTINUED | OUTPATIENT
Start: 2022-08-21 | End: 2022-08-22 | Stop reason: HOSPADM

## 2022-08-21 RX ORDER — FERROUS SULFATE 325(65) MG
325 TABLET ORAL
Status: DISCONTINUED | OUTPATIENT
Start: 2022-08-22 | End: 2022-08-22 | Stop reason: HOSPADM

## 2022-08-21 RX ORDER — SODIUM CHLORIDE 9 MG/ML
3 INJECTION INTRAVENOUS
Status: DISCONTINUED | OUTPATIENT
Start: 2022-08-21 | End: 2022-08-22 | Stop reason: HOSPADM

## 2022-08-21 RX ORDER — INSULIN LISPRO 100 [IU]/ML
1-5 INJECTION, SOLUTION INTRAVENOUS; SUBCUTANEOUS
Status: DISCONTINUED | OUTPATIENT
Start: 2022-08-21 | End: 2022-08-22 | Stop reason: HOSPADM

## 2022-08-21 RX ORDER — CYCLOBENZAPRINE HCL 10 MG
10 TABLET ORAL 2 TIMES DAILY
Status: DISCONTINUED | OUTPATIENT
Start: 2022-08-21 | End: 2022-08-22 | Stop reason: HOSPADM

## 2022-08-21 RX ORDER — POTASSIUM CHLORIDE 20 MEQ/1
20 TABLET, EXTENDED RELEASE ORAL DAILY
Status: DISCONTINUED | OUTPATIENT
Start: 2022-08-22 | End: 2022-08-22 | Stop reason: HOSPADM

## 2022-08-21 RX ORDER — GABAPENTIN 100 MG/1
100 CAPSULE ORAL 3 TIMES DAILY
Status: DISCONTINUED | OUTPATIENT
Start: 2022-08-21 | End: 2022-08-22 | Stop reason: HOSPADM

## 2022-08-21 RX ORDER — FUROSEMIDE 40 MG/1
40 TABLET ORAL DAILY
Status: DISCONTINUED | OUTPATIENT
Start: 2022-08-22 | End: 2022-08-22 | Stop reason: HOSPADM

## 2022-08-21 RX ORDER — LISINOPRIL 10 MG/1
10 TABLET ORAL DAILY
Status: DISCONTINUED | OUTPATIENT
Start: 2022-08-22 | End: 2022-08-22 | Stop reason: HOSPADM

## 2022-08-21 RX ORDER — MAGNESIUM HYDROXIDE/ALUMINUM HYDROXICE/SIMETHICONE 120; 1200; 1200 MG/30ML; MG/30ML; MG/30ML
30 SUSPENSION ORAL EVERY 6 HOURS PRN
Status: DISCONTINUED | OUTPATIENT
Start: 2022-08-21 | End: 2022-08-22 | Stop reason: HOSPADM

## 2022-08-21 RX ORDER — DOCUSATE SODIUM 100 MG/1
100 CAPSULE, LIQUID FILLED ORAL 2 TIMES DAILY PRN
Status: DISCONTINUED | OUTPATIENT
Start: 2022-08-21 | End: 2022-08-22 | Stop reason: HOSPADM

## 2022-08-21 RX ORDER — CARVEDILOL 6.25 MG/1
6.25 TABLET ORAL 2 TIMES DAILY WITH MEALS
Status: DISCONTINUED | OUTPATIENT
Start: 2022-08-22 | End: 2022-08-22 | Stop reason: HOSPADM

## 2022-08-21 RX ORDER — ASPIRIN 81 MG/1
81 TABLET ORAL DAILY
Status: DISCONTINUED | OUTPATIENT
Start: 2022-08-22 | End: 2022-08-22 | Stop reason: HOSPADM

## 2022-08-21 RX ORDER — FUROSEMIDE 10 MG/ML
40 INJECTION INTRAMUSCULAR; INTRAVENOUS ONCE
Status: DISCONTINUED | OUTPATIENT
Start: 2022-08-21 | End: 2022-08-22 | Stop reason: HOSPADM

## 2022-08-21 RX ORDER — BLOOD SUGAR DIAGNOSTIC
STRIP MISCELLANEOUS
Qty: 100 EACH | Refills: 0 | Status: SHIPPED | OUTPATIENT
Start: 2022-08-21 | End: 2022-08-21 | Stop reason: CLARIF

## 2022-08-21 RX ORDER — INSULIN LISPRO 100 [IU]/ML
1-6 INJECTION, SOLUTION INTRAVENOUS; SUBCUTANEOUS
Status: DISCONTINUED | OUTPATIENT
Start: 2022-08-22 | End: 2022-08-22 | Stop reason: HOSPADM

## 2022-08-21 RX ORDER — ACETAMINOPHEN 325 MG/1
650 TABLET ORAL EVERY 6 HOURS PRN
Status: DISCONTINUED | OUTPATIENT
Start: 2022-08-21 | End: 2022-08-22 | Stop reason: HOSPADM

## 2022-08-21 RX ORDER — TAMSULOSIN HYDROCHLORIDE 0.4 MG/1
0.4 CAPSULE ORAL
Status: DISCONTINUED | OUTPATIENT
Start: 2022-08-22 | End: 2022-08-22 | Stop reason: HOSPADM

## 2022-08-21 RX ORDER — ENOXAPARIN SODIUM 100 MG/ML
40 INJECTION SUBCUTANEOUS DAILY
Status: DISCONTINUED | OUTPATIENT
Start: 2022-08-22 | End: 2022-08-22 | Stop reason: HOSPADM

## 2022-08-21 RX ORDER — ONDANSETRON 2 MG/ML
4 INJECTION INTRAMUSCULAR; INTRAVENOUS EVERY 6 HOURS PRN
Status: DISCONTINUED | OUTPATIENT
Start: 2022-08-21 | End: 2022-08-22 | Stop reason: HOSPADM

## 2022-08-21 RX ORDER — OXYCODONE HYDROCHLORIDE 5 MG/1
7.5 TABLET ORAL 2 TIMES DAILY PRN
Status: DISCONTINUED | OUTPATIENT
Start: 2022-08-21 | End: 2022-08-22 | Stop reason: HOSPADM

## 2022-08-21 RX ORDER — INSULIN ASPART 100 [IU]/ML
45 INJECTION, SUSPENSION SUBCUTANEOUS
Status: DISCONTINUED | OUTPATIENT
Start: 2022-08-22 | End: 2022-08-22 | Stop reason: HOSPADM

## 2022-08-21 NOTE — DISCHARGE INSTRUCTIONS
Please call your PCP tomorrow for an appointment to discuss your diabetes management  It is imperative that you schedule an appointment so that you learn how to get better control of DM  I believe your chest pain is related to muscular issue  I recommend Tylenol for pain control  If the pain worsens or becomes associated with exertion, return to the ED for evaluation

## 2022-08-21 NOTE — Clinical Note
Case was discussed with Donta Short ([place]) and the patient's admission status was agreed to be Admission Status: observation status to the service of Dr Donta Short ([place])

## 2022-08-21 NOTE — ED ATTENDING ATTESTATION
Final Diagnosis:  1  Chest pain, unspecified    2  Type 2 diabetes mellitus with hyperglycemia, with long-term current use of insulin [facility])      ED Course as of 08/22/22 2302   Sun Aug 21, 2022   1603 Creatinine(!): 1 69  CKD   1657 POCUS       Prince JAMES MD, saw and evaluated the patient  All available labs and X-rays were ordered by me or the resident and have been reviewed by myself  I discussed the patient with the resident / non-physician and agree with the resident's / non-physician practitioner's findings and plan as documented in the resident's / non-physician practicitioner's note, except where noted  At this point, I agree with the current assessment done in the ED  I was present during key portions of all procedures performed unless otherwise stated  Chief Complaint   Patient presents with    Chest Pain     Patient presents with constant CP since this morning  States he was watching TV when it started  Patient took 3 ibuprofen without relief  Denies SOB  This is a 78 y o  male presenting for evaluation of CP  Since this morning he has been having CP, LEFT sternal border  No f/ch/n/v   Chronic SOB that is unchanged  He is on oxygen at night, 2L  No falls/trauma  He was recently in the hospital    This started only today though  3 motrin didn't help  He doesn't have test strips to help manage his diabetes but has been compliant with medications including insulin  PMH:   has a past medical history of Abdominal pain, MARANDA (acute kidney injury) ([address] ) (6/19/2018), Cardiac disease, CHF (congestive heart failure) ([address] ), COPD, severe ([address] ), Coronary artery disease, DDD (degenerative disc disease), lumbar (9/1/2020), Diabetes mellitus ([address] ), Dyspnea, GERD (gastroesophageal reflux disease), Hyperlipidemia, Hypertension, MI (myocardial infarction) ([address] ), Nodule of apex of right lung, JACQUELINE (obstructive sleep apnea), and Prostate cancer ([address] )      PSH:   has a past surgical history that includes Prostate surgery; Abdominal surgery; Angioplasty; Esophagogastroduodenoscopy (N/A, 10/2/2017); Appendectomy; Skin graft; Knee cartilage surgery; Other surgical history; Colonoscopy (); IR thoracentesis (11/3/2020); and CT needle biopsy lung (11/3/2020)  Social:  Social History     Substance and Sexual Activity   Alcohol Use Never     Social History     Tobacco Use   Smoking Status Former Smoker    Packs/day: 1 50    Years: 50 00    Pack years: 75 00    Start date: 38    Quit date: 2020    Years since quittin 1   Smokeless Tobacco Never Used     Social History     Substance and Sexual Activity   Drug Use No     PE:  Vitals:    22 1500 22 1504 22 1745   BP: 144/72  135/77   BP Location: Left arm  Left arm   Pulse: 83  80   Resp: 20  22   Temp:  98 1 °F (36 7 °C)    TempSrc:  Tympanic    SpO2: 100%  98%   Weight: 99 8 kg (220 lb)     Height: 6' (1 829 m)     General: VSS, NAD, awake, alert  Well-nourished, well-developed  Appears stated age  Head: Normocephalic, atraumatic, nontender  Eyes: PERRL, EOM-I  No diplopia  No hyphema  No subconjunctival hemorrhages  Symmetrical lids  ENTAtraumatic external nose and ears  MMM  No stridor  Normal phonation  No drooling  Base of mouth is soft  No mastoid tenderness  Neck: Symmetric, trachea midline  No JVD  CV: Peripheral pulses +2 throughout    +chest wall tenderness LEFT sternal border   Lungs:   Unlabored   No retractions  No crepitus  No tachypnea  No paradoxical motion  Abd: +BS, soft, NT/ND    MSK:   FROM   Back:   No CVAT  Skin: Dry, intact  Neuro: AAOx3, GCS 15, CN II-XII grossly intact  Motor grossly intact  Psychiatric/Behavioral: Appropriate mood and affect   Exam: deferred  A:  - CP  P:  - ACS r/o  - NSAIDs  - reassuring story    - 13 point ROS was performed and all are normal unless stated in the history above  - Nursing note reviewed  Vitals reviewed     - Orders placed by myself and/or advanced practitioner / resident     - Previous chart was reviewed  - No language barrier    - History obtained from patient  - There are no limitations to the history obtained  - Critical care time: Not applicable for this patient  Code Status: Prior  Advance Directive and Living Will:      Power of :    POLST:      Medications - No data to display  XR chest 2 views   ED Interpretation   R sided pleural effusion improved compared to prior         Final Result      Stable appearance of the right lung with volume loss, right upper lobe scarring and right pleural effusion                    Workstation performed: [id number]           Orders Placed This Encounter   Procedures    XR chest 2 views    CBC and differential    Comprehensive metabolic panel    HS Troponin 0hr (reflex protocol)    NT-BNP PRO    Lipase    HS Troponin I 2hr    ECG 12 lead     Labs Reviewed   CBC AND DIFFERENTIAL - Abnormal       Result Value Ref Range Status    WBC 8 83  4 31 - 10 16 Thousand/uL Final    RBC 4 49  3 88 - 5 62 Million/uL Final    Hemoglobin 12 1  12 0 - 17 0 g/dL Final    Hematocrit 39 1  36 5 - 49 3 % Final    MCV 87  82 - 98 fL Final    MCH 26 9  26 8 - 34 3 pg Final    MCHC 30 9 (*) 31 4 - 37 4 g/dL Final    RDW 16 9 (*) 11 6 - 15 1 % Final    MPV 11 7  8 9 - 12 7 fL Final    Platelets 796 (*) 061 - 390 Thousands/uL Final    nRBC 0  /100 WBCs Final    Neutrophils Relative 79 (*) 43 - 75 % Final    Immat GRANS % 1  0 - 2 % Final    Lymphocytes Relative 10 (*) 14 - 44 % Final    Monocytes Relative 9  4 - 12 % Final    Eosinophils Relative 1  0 - 6 % Final    Basophils Relative 0  0 - 1 % Final    Neutrophils Absolute 7 04  1 85 - 7 62 Thousands/µL Final    Immature Grans Absolute 0 05  0 00 - 0 20 Thousand/uL Final    Lymphocytes Absolute 0 84  0 60 - 4 47 Thousands/µL Final    Monocytes Absolute 0 81  0 17 - 1 22 Thousand/µL Final    Eosinophils Absolute 0 06  0 00 - 0 61 Thousand/µL Final    Basophils Absolute 0 03  0 00 - 0 10 Thousands/µL Final   COMPREHENSIVE METABOLIC PANEL - Abnormal    Sodium 135  135 - 147 mmol/L Final    Potassium 4 6  3 5 - 5 3 mmol/L Final    Comment: Slightly Hemolyzed; Results May be Affected    Chloride 104  96 - 108 mmol/L Final    CO2 28  21 - 32 mmol/L Final    ANION GAP 3 (*) 4 - 13 mmol/L Final    BUN 21  5 - 25 mg/dL Final    Creatinine 1 69 (*) 0 60 - 1 30 mg/dL Final    Comment: Standardized to IDMS reference method    Glucose 365 (*) 65 - 140 mg/dL Final    Comment: If the patient is fasting, the ADA then defines impaired fasting glucose as > 100 mg/dL and diabetes as > or equal to 123 mg/dL  Specimen collection should occur prior to Sulfasalazine administration due to the potential for falsely depressed results  Specimen collection should occur prior to Sulfapyridine administration due to the potential for falsely elevated results  Calcium 8 1 (*) 8 3 - 10 1 mg/dL Final    Corrected Calcium 9 1  8 3 - 10 1 mg/dL Final    AST 19  5 - 45 U/L Final    Comment: Slightly Hemolyzed; Results May be Affected  Specimen collection should occur prior to Sulfasalazine administration due to the potential for falsely depressed results  ALT 17  12 - 78 U/L Final    Comment: Specimen collection should occur prior to Sulfasalazine and/or Sulfapyridine administration due to the potential for falsely depressed results  Alkaline Phosphatase 75  46 - 116 U/L Final    Total Protein 7 3  6 4 - 8 4 g/dL Final    Albumin 2 7 (*) 3 5 - 5 0 g/dL Final    Total Bilirubin 0 12 (*) 0 20 - 1 00 mg/dL Final    Comment: Use of this assay is not recommended for patients undergoing treatment with eltrombopag due to the potential for falsely elevated results      eGFR 39  ml/min/1 73sq m Final    Narrative:     [place] guidelines for Chronic Kidney Disease (CKD):     Stage 1 with normal or high GFR (GFR > 90 mL/min/1 73 square meters)    Stage 2 Mild CKD (GFR = 60-89 mL/min/1 73 square meters)    Stage 3A Moderate CKD (GFR = 45-59 mL/min/1 73 square meters)    Stage 3B Moderate CKD (GFR = 30-44 mL/min/1 73 square meters)    Stage 4 Severe CKD (GFR = 15-29 mL/min/1 73 square meters)    Stage 5 End Stage CKD (GFR <15 mL/min/1 73 square meters)  Note: GFR calculation is accurate only with a steady state creatinine   NT-BNP PRO (BRAIN NATRIURETIC PEPTIDE) - Abnormal    NT-proBNP 3,143 (*) <125 pg/mL Final   POCT GLUCOSE - Abnormal    POC Glucose 375 (*) 65 - 140 mg/dl Final   HS TROPONIN I 0HR - Normal    hs TnI 0hr 13  "Refer to ACS Flowchart"- see link ng/L Final    Comment:                                              Initial (time 0) result  If >=50 ng/L, Myocardial injury suggested ;  Type of myocardial injury and treatment strategy  to be determined  If 5-49 ng/L, a delta result at 2 hours and or 4 hours will be needed to further evaluate  If <4 ng/L, and chest pain has been >3 hours since onset, patient may qualify for discharge based on the HEART score in the ED  If <5 ng/L and <3hours since onset of chest pain, a delta result at 2 hours will be needed to further evaluate  HS Troponin 99th Percentile URL of a Health Population=12 ng/L with a 95% Confidence Interval of 8-18 ng/L  Second Troponin (time 2 hours)  If calculated delta >= 20 ng/L,  Myocardial injury suggested ; Type of myocardial injury and treatment strategy to be determined  If 5-49 ng/L and the calculated delta is 5-19 ng/L, consult medical service for evaluation  Continue evaluation for ischemia on ecg and other possible etiology and repeat hs troponin at 4 hours  If delta is <5 ng/L at 2 hours, consider discharge based on risk stratification via the HEART score (if in ED), or MIKKI risk score in IP/Observation  HS Troponin 99th Percentile URL of a Health Population=12 ng/L with a 95% Confidence Interval of 8-18 ng/L     LIPASE - Normal    Lipase 153  73 - 393 u/L Final   HS TROPONIN I 2HR - Normal    hs TnI 2hr 14  "Refer to ACS Flowchart"- see link ng/L Final    Comment:                                              Initial (time 0) result  If >=50 ng/L, Myocardial injury suggested ;  Type of myocardial injury and treatment strategy  to be determined  If 5-49 ng/L, a delta result at 2 hours and or 4 hours will be needed to further evaluate  If <4 ng/L, and chest pain has been >3 hours since onset, patient may qualify for discharge based on the HEART score in the ED  If <5 ng/L and <3hours since onset of chest pain, a delta result at 2 hours will be needed to further evaluate  HS Troponin 99th Percentile URL of a Health Population=12 ng/L with a 95% Confidence Interval of 8-18 ng/L  Second Troponin (time 2 hours)  If calculated delta >= 20 ng/L,  Myocardial injury suggested ; Type of myocardial injury and treatment strategy to be determined  If 5-49 ng/L and the calculated delta is 5-19 ng/L, consult medical service for evaluation  Continue evaluation for ischemia on ecg and other possible etiology and repeat hs troponin at 4 hours  If delta is <5 ng/L at 2 hours, consider discharge based on risk stratification via the HEART score (if in ED), or MIKKI risk score in IP/Observation  HS Troponin 99th Percentile URL of a Health Population=12 ng/L with a 95% Confidence Interval of 8-18 ng/L  Delta 2hr hsTnI 1  <20 ng/L Final     Time reflects when diagnosis was documented in both MDM as applicable and the Disposition within this note       Time User Action Codes Description Comment    8/21/2022  6:32 PM Cony Leonard [R07 [id number]] Chest pain, unspecified     8/21/2022  6:41 PM Cony Leonard [E11 [id number],  Z79 4] Type 2 diabetes mellitus with hyperglycemia, with long-term current use of insulin [facility])           ED Disposition       ED Disposition   Discharge    Condition   Stable    Date/Time   Sun Aug 21, 2022  6:41 PM    Comment   John Conley Sr  discharge to home/self care  Follow-up Information       Follow up With Specialties Details Why Iris CHENG Martell Mathur MD Family Medicine Call in 1 day  [phone]  [phone] [address] [id number] [phone]            Discharge Medication List as of 8/21/2022  6:43 PM        CONTINUE these medications which have CHANGED    Details   glucose blood (OneTouch Verio) test strip May substitute brand based on insurance coverage  Check glucose QID , Normal           CONTINUE these medications which have NOT CHANGED    Details   aspirin (ECOTRIN LOW STRENGTH) 81 mg EC tablet Take 1 tablet (81 mg total) by mouth daily, Starting Sun 5/8/2022, Normal      BD Insulin Syringe U-500 31G X 6MM 0 5 ML MISC USE 1 SYRINGE THREE TIMES A DAY FOR INJECTING INSULIN, Normal      carvedilol (COREG) 6 25 mg tablet TAKE 1 TABLET BY MOUTH TWO TIMES A DAY WITH MEALS, Normal      cyanocobalamin 1,000 mcg/mL Inject 1 mL (1,000 mcg total) into a muscle every 30 (thirty) days, Starting Tue 8/16/2022, No Print      cyclobenzaprine (FLEXERIL) 10 mg tablet Take 1 tablet (10 mg total) by mouth in the morning and 1 tablet (10 mg total) before bedtime  , Starting Tue 5/17/2022, Normal      ferrous sulfate 325 (65 Fe) mg tablet Take 1 tablet (325 mg total) by mouth daily with breakfast, Starting Tue 5/17/2022, Normal      fluticasone-umeclidinium-vilanterol (Trelegy Ellipta) 100-62 5-25 MCG/INH inhaler Inhale 1 puff  in the morning  Rinse mouth after use   , Starting Tue 5/17/2022, Until u 6/16/2022, Normal      furosemide (LASIX) 40 mg tablet TAKE 1 TABLET BY MOUTH DAILY, Normal      gabapentin (NEURONTIN) 100 mg capsule Take 1 capsule (100 mg total) by mouth 3 (three) times a day, Starting Sun 6/19/2022, Normal      insulin lispro protamine-insulin lispro (HumaLOG Mix 75/25) 100 units/mL Inject 45 Units under the skin 3 (three) times a day, Starting Tue 8/16/2022, Normal      ipratropium-albuterol (DUO-NEB) 0 5-2 5 mg/3 mL nebulizer solution Take 3 mL by nebulization 3 (three) times a day, Starting Sun 6/19/2022, Normal      lisinopril (ZESTRIL) 10 mg tablet TAKE 1 TABLET BY MOUTH DAILY, Normal      oxyCODONE 7 5 MG TABS Take 7 5 mg by mouth 2 (two) times a day as needed for severe pain Max Daily Amount: 15 mg, Starting Sun 6/19/2022, Normal      potassium chloride (K-DUR,KLOR-CON) 20 mEq tablet Take 1 tablet (20 mEq total) by mouth in the morning , Starting Sun 5/22/2022, Normal      simvastatin (ZOCOR) 40 mg tablet Take 1 tablet (40 mg total) by mouth in the morning , Starting Tue 5/17/2022, Normal      tamsulosin (FLOMAX) 0 4 mg Take 1 capsule (0 4 mg total) by mouth daily with dinner, Starting Sun 6/19/2022, Normal           No discharge procedures on file  Prior to Admission Medications   Prescriptions Last Dose Informant Patient Reported? Taking? BD Insulin Syringe U-500 31G X 6MM 0 5 ML MISC   No No   Sig: USE 1 SYRINGE THREE TIMES A DAY FOR INJECTING INSULIN   glucose blood (OneTouch Verio) test strip   No No   Sig: May substitute brand based on insurance coverage  Check glucose QID  Facility-Administered Medications: None       Portions of the record may have been created with voice recognition software  Occasional wrong word or "sound a like" substitutions may have occurred due to the inherent limitations of voice recognition software  Read the chart carefully and recognize, using context, where substitutions have occurred      Electronically signed by:  Desmond Escobedo

## 2022-08-21 NOTE — ED PROVIDER NOTES
History  Chief Complaint   Patient presents with    Chest Pain     Patient presents with constant CP since this morning  States he was watching TV when it started  Patient took 3 ibuprofen without relief  Denies SOB  Patient is a 78year old male with PMHx COPD, IDDM, CAD, CHF, GERD, HLD, HTN, presenting to the ED for evaluation of chest pain  States that he woke up with the pain this morning in the center of his chest, described as constant aching sensation that is not associated with exertion  Pain does not radiate, no associated exacerbation of his chronic dyspnea, no dizziness, lightheadedness, diaphoresis, coughing, congestion, fevers, abdominal pain, nausea, vomiting, diarrhea, urinary complaints  Feels that his legs have been swollen recently  Reports being compliant with daily Lasix  Also states that he is on oxygen at home sometimes "when I need it" at 2L [place]  Patient denies current smoking  Reports being admitted to the hospital recently for hyperglycemia and diabetes management  States he is on insulin, but recently ran out of his glucose test strips  Prior to Admission Medications   Prescriptions Last Dose Informant Patient Reported? Taking? BD Insulin Syringe U-500 31G X 6MM 0 5 ML MISC   No No   Sig: USE 1 SYRINGE THREE TIMES A DAY FOR INJECTING INSULIN   aspirin (ECOTRIN LOW STRENGTH) 81 mg EC tablet   No No   Sig: Take 1 tablet (81 mg total) by mouth daily   carvedilol (COREG) 6 25 mg tablet   No No   Sig: TAKE 1 TABLET BY MOUTH TWO TIMES A DAY WITH MEALS   cyanocobalamin 1,000 mcg/mL   No No   Sig: Inject 1 mL (1,000 mcg total) into a muscle every 30 (thirty) days   cyclobenzaprine (FLEXERIL) 10 mg tablet   No No   Sig: Take 1 tablet (10 mg total) by mouth in the morning and 1 tablet (10 mg total) before bedtime     ferrous sulfate 325 (65 Fe) mg tablet   No No   Sig: Take 1 tablet (325 mg total) by mouth daily with breakfast   fluticasone-umeclidinium-vilanterol (Trelegy Ellipta) 100-62 5-25 MCG/INH inhaler   No No   Sig: Inhale 1 puff  in the morning  Rinse mouth after use      furosemide (LASIX) 40 mg tablet   No No   Sig: TAKE 1 TABLET BY MOUTH DAILY   gabapentin (NEURONTIN) 100 mg capsule   No No   Sig: Take 1 capsule (100 mg total) by mouth 3 (three) times a day   glucose blood (OneTouch Verio) test strip   No No   Sig: May substitute brand based on insurance coverage  Check glucose QID  insulin lispro protamine-insulin lispro (HumaLOG Mix 75/25) 100 units/mL   No No   Sig: Inject 45 Units under the skin 3 (three) times a day   ipratropium-albuterol (DUO-NEB) 0 5-2 5 mg/3 mL nebulizer solution   No No   Sig: Take 3 mL by nebulization 3 (three) times a day   lisinopril (ZESTRIL) 10 mg tablet   No No   Sig: TAKE 1 TABLET BY MOUTH DAILY   oxyCODONE 7 5 MG TABS   No No   Sig: Take 7 5 mg by mouth 2 (two) times a day as needed for severe pain Max Daily Amount: 15 mg   Patient not taking: Reported on 8/16/2022   potassium chloride (K-DUR,KLOR-CON) 20 mEq tablet   No No   Sig: Take 1 tablet (20 mEq total) by mouth in the morning  simvastatin (ZOCOR) 40 mg tablet   No No   Sig: Take 1 tablet (40 mg total) by mouth in the morning     tamsulosin (FLOMAX) 0 4 mg   No No   Sig: Take 1 capsule (0 4 mg total) by mouth daily with dinner      Facility-Administered Medications: None       Past Medical History:   Diagnosis Date    Abdominal pain     [facility] 6/19/2018    Cardiac disease     [facility]est[facility] failure[facility]     COPD, severe ([facility]     Coronary artery disease     DDD (degenerative disc disease), lumbar 9/1/2020    Diabetes mellitus ([facility] )     Dyspnea     GERD (gastroesophageal reflux disease)     Hyperlipidemia     Hypertension     MI (myocardial infarction) ([facility] )     with 3 stents    Nodule of apex of right lung     JACQUELINE (obstructive sleep apnea)     Prostate cancer [facility])        Past Surgical History:   Procedure Laterality Date    ABDOMINAL SURGERY      exploratory    ANGIOPLASTY      3 stents    APPENDECTOMY      COLONOSCOPY      CT NEEDLE BIOPSY LUNG  11/3/2020    ESOPHAGOGASTRODUODENOSCOPY N/A 10/2/2017    Procedure: ESOPHAGOGASTRODUODENOSCOPY (EGD); Surgeon: Marleny Holcomb MD;  Location: [facility]; Service: Gastroenterology    IR THORACENTESIS  11/3/2020    KNEE CARTILAGE SURGERY      OTHER SURGICAL HISTORY      stent placement    PROSTATE SURGERY      SKIN GRAFT      Basal cell CA back       Family History   Problem Relation Age of Onset    Heart disease Father     Other Father         Mesothelioma      I have reviewed and agree with the history as documented  E-Cigarette/Vaping    E-Cigarette Use Never User      E-Cigarette/Vaping Substances    Nicotine No     THC No     CBD No     Flavoring No     Other No     Unknown No      Social History     Tobacco Use    Smoking status: Former Smoker     Packs/day: 1 50     Years: 50 00     Pack years: 75 00     Start date: 38     Quit date: 2020     Years since quittin 1    Smokeless tobacco: Never Used   Vaping Use    Vaping Use: Never used   Substance Use Topics    Alcohol use: Never    Drug use: No        Review of Systems   Constitutional: Negative for activity change, chills, diaphoresis, fatigue and fever  HENT: Negative for congestion, ear pain, sinus pressure, sinus pain and sore throat  Eyes: Negative for pain and visual disturbance  Respiratory: Positive for shortness of breath (chronic)  Negative for cough and chest tightness  Cardiovascular: Positive for chest pain and leg swelling  Negative for palpitations  Gastrointestinal: Negative for abdominal pain, diarrhea, nausea and vomiting  Genitourinary: Negative for difficulty urinating, dysuria, flank pain, frequency, hematuria and urgency  Musculoskeletal: Negative for arthralgias, back pain, myalgias and neck pain  Skin: Negative for color change and rash     Neurological: Negative for dizziness, seizures, syncope, light-headedness and headaches  All other systems reviewed and are negative  Physical Exam  ED Triage Vitals   Temperature Pulse Respirations Blood Pressure SpO2   08/21/22 1504 08/21/22 1500 08/21/22 1500 08/21/22 1500 08/21/22 1500   98 1 °F (36 7 °C) 83 20 144/72 100 %      Temp Source Heart Rate Source Patient Position - Orthostatic VS BP Location FiO2 (%)   08/21/22 1504 08/21/22 1500 08/21/22 1500 08/21/22 1500 --   Tympanic Monitor Sitting Left arm       Pain Score       08/21/22 1500       7             Orthostatic Vital Signs  Vitals:    08/21/22 1500 08/21/22 1745   BP: 144/72 135/77   Pulse: 83 80   Patient Position - Orthostatic VS: Sitting Sitting       Physical Exam  Vitals and nursing note reviewed  Constitutional:       General: He is not in acute distress  Appearance: He is well-developed and normal weight  He is not toxic-appearing  HENT:      Head: Normocephalic and atraumatic  Eyes:      Extraocular Movements: Extraocular movements intact  Conjunctiva/sclera: Conjunctivae normal       Pupils: Pupils are equal, round, and reactive to light  Neck:      Vascular: No JVD  Trachea: No tracheal deviation  Cardiovascular:      Rate and Rhythm: Normal rate and regular rhythm  Pulses:           Radial pulses are 2+ on the right side and 2+ on the left side  Heart sounds: Normal heart sounds  No murmur heard  Pulmonary:      Effort: Pulmonary effort is normal  No tachypnea or respiratory distress  Breath sounds: Normal breath sounds  No decreased breath sounds, wheezing, rhonchi or rales  Chest:      Chest wall: No mass or tenderness  Abdominal:      General: Bowel sounds are normal       Palpations: Abdomen is soft  Tenderness: There is no abdominal tenderness  There is no guarding  Musculoskeletal:      Cervical back: Neck supple  Right lower leg: No tenderness  Edema (slight bipedal) present        Left lower leg: No tenderness  Edema (slight bipedal) present  Skin:     General: Skin is warm and dry  Capillary Refill: Capillary refill takes less than 2 seconds  Findings: No rash  Comments: Multiple areas of healing ecchymosis and chronic skin changes     Neurological:      General: No focal deficit present  Mental Status: He is alert and oriented to person, place, and time     Psychiatric:         Mood and Affect: Mood normal          ED Medications  Medications - No data to display    Diagnostic Studies  Results Reviewed     Procedure Component Value Units Date/Time    HS Troponin I 2hr [[id number]]  (Normal) Collected: 08/21/22 1744    Lab Status: Final result Specimen: Blood from Arm, Left Updated: 08/21/22 1829     hs TnI 2hr 14 ng/L      Delta 2hr hsTnI 1 ng/L     HS Troponin I 4hr [[id number]]     Lab Status: No result Specimen: Blood     HS Troponin 0hr (reflex protocol) [[id number]]  (Normal) Collected: 08/21/22 1526    Lab Status: Final result Specimen: Blood from Arm, Right Updated: 08/21/22 1559     hs TnI 0hr 13 ng/L     NT-BNP PRO [[id number]]  (Abnormal) Collected: 08/21/22 1526    Lab Status: Final result Specimen: Blood from Arm, Right Updated: 08/21/22 1554     NT-proBNP 3,143 pg/mL     Lipase [[id number]]  (Normal) Collected: 08/21/22 1526    Lab Status: Final result Specimen: Blood from Arm, Right Updated: 08/21/22 1553     Lipase 153 u/L     Comprehensive metabolic panel [[id number]]  (Abnormal) Collected: 08/21/22 1526    Lab Status: Final result Specimen: Blood from Arm, Right Updated: 08/21/22 1553     Sodium 135 mmol/L      Potassium 4 6 mmol/L      Chloride 104 mmol/L      CO2 28 mmol/L      ANION GAP 3 mmol/L      BUN 21 mg/dL      Creatinine 1 69 mg/dL      Glucose 365 mg/dL      Calcium 8 1 mg/dL      Corrected Calcium 9 1 mg/dL      AST 19 U/L      ALT 17 U/L      Alkaline Phosphatase 75 U/L      Total Protein 7 3 g/dL      Albumin 2 7 g/dL      Total Bilirubin 0 12 mg/dL eGFR 39 ml/min/1 73sq m     Narrative:      Shahrzad guidelines for Chronic Kidney Disease (CKD):     Stage 1 with normal or high GFR (GFR > 90 mL/min/1 73 square meters)    Stage 2 Mild CKD (GFR = 60-89 mL/min/1 73 square meters)    Stage 3A Moderate CKD (GFR = 45-59 mL/min/1 73 square meters)    Stage 3B Moderate CKD (GFR = 30-44 mL/min/1 73 square meters)    Stage 4 Severe CKD (GFR = 15-29 mL/min/1 73 square meters)    Stage 5 End Stage CKD (GFR <15 mL/min/1 73 square meters)  Note: GFR calculation is accurate only with a steady state creatinine    CBC and differential [[id number]]  (Abnormal) Collected: 08/21/22 1526    Lab Status: Final result Specimen: Blood from Arm, Right Updated: 08/21/22 1534     WBC 8 83 Thousand/uL      RBC 4 49 Million/uL      Hemoglobin 12 1 g/dL      Hematocrit 39 1 %      MCV 87 fL      MCH 26 9 pg      MCHC 30 9 g/dL      RDW 16 9 %      MPV 11 7 fL      Platelets 302 Thousands/uL      nRBC 0 /100 WBCs      Neutrophils Relative 79 %      Immat GRANS % 1 %      Lymphocytes Relative 10 %      Monocytes Relative 9 %      Eosinophils Relative 1 %      Basophils Relative 0 %      Neutrophils Absolute 7 04 Thousands/µL      Immature Grans Absolute 0 05 Thousand/uL      Lymphocytes Absolute 0 84 Thousands/µL      Monocytes Absolute 0 81 Thousand/µL      Eosinophils Absolute 0 06 Thousand/µL      Basophils Absolute 0 03 Thousands/µL     Fingerstick Glucose (POCT) [[id number]]  (Abnormal) Collected: 08/21/22 1507    Lab Status: Final result Updated: 08/21/22 1509     POC Glucose 375 mg/dl                  XR chest 2 views   ED Interpretation by Renata oMrtensen DO (08/21 1655)   R sided pleural effusion improved compared to prior               Procedures  Procedures      ED Course  ED Course as of 08/21/22 1903   Sun Aug 21, 2022   1552 This is a 68-year-old male with past medical history of coronary artery disease, CHF, COPD    He presents today for evaluation of chest pain localized to the sternal border, not associated with a exacerbation  At this time, I suspect patient's symptoms might related to CHF, have COPD or accessory should, or ACS  Low suspicion for PE given absence of abnormal vital signs and no change in patient's chronic shortness of breath due to COPD  Will do cardiac workup, including CBC, CMP, BNP, troponin, EKG and chest x-ray  The patient's initial fingerstick is significant for glucose of 375  Low suspicion for DKA at this time  The EKG shows normal sinus rhythm at 77 beats per minute, right bundle branch block pattern, no acute ischemic changes  1554 POC Glucose(!): 375   1607 hs TnI 0hr: 13  Will do delta troponin  1607 NT-proBNP(!): 3,143  Elevated from 996 taken 2 months ago  1654 CXR reviewed and shows R sided pleural effusion  Bedside US shows small pleural effusion on the R base  1831 hs TnI 2hr: 14   1852 Diagnostics reviewed  No significant change in troponin  On re-evaluation, patient is resting comfortably  No chest pain  Reading the paper  Tolerated PO during ED stay  Advised patient that he needs to call his doctor tomorrow to schedule an appointment for diabetes follow up  States he has plenty of insulin, but no test strips  Patient states he gets them for free at the pharmacy  I asked why he hasn't gone there to get his test strips and patient states "I haven't done it yet  " I encouraged patient to make this a priority for tomorrow and he agreed  Patient observed ambulatory from the department  I reviewed all testing with the patient:  I gave oral return precautions for what to return for in addition to the written return precautions  The patient verbalized understanding of the discharge instructions and warnings that would necessitate return to the Emergency Department  I specifically highlighted areas of special concern regarding the written and verbal discharge instructions and return precautions      All questions were answered prior to discharge  Identification of Seniors at [address] [place] [address] Most Recent Value   (ISAR) Identification of Seniors at Risk    Before the illness or injury that brought you to the Emergency, did you need someone to help you on a regular basis? 0 Filed at: 08/21/2022 1504   In the last 24 hours, have you needed more help than usual? 0 Filed at: 08/21/2022 1504   Have you been hospitalized for one or more nights during the past 6 months? 1 Filed at: 08/21/2022 1504   In general, do you see well? 0 Filed at: 08/21/2022 1504   In general, do you have serious problems with your memory? 1 Filed at: 08/21/2022 1504   Do you take more than three different medications every day? 1 Filed at: 08/21/2022 1504   ISAR Score 3 Filed at: 08/21/2022 1504                    SBIRT 20yo+    Flowsheet Row Most Recent Value   SBIRT (23 yo +)    In order to provide better care to our patients, we are screening all of our patients for alcohol and drug use  Would it be okay to ask you these screening questions? Yes Filed at: 08/21/2022 1511   Initial Alcohol Screen: US AUDIT-C     1  How often do you have a drink containing alcohol? 0 Filed at: 08/21/2022 1511   2  How many drinks containing alcohol do you have on a typical day you are drinking? 0 Filed at: 08/21/2022 1511   3a  Male UNDER 65: How often do you have five or more drinks on one occasion? 0 Filed at: 08/21/2022 1511   3b  FEMALE Any Age, or MALE 65+: How often do you have 4 or more drinks on one occassion? 0 Filed at: 08/21/2022 1511   Audit-C Score 0 Filed at: 08/21/2022 1511   BERTHA: How many times in the past year have you    Used an illegal drug or used a prescription medication for non-medical reasons?  Never Filed at: 08/21/2022 1511                MDM    Disposition  Final diagnoses:   Chest pain, unspecified     Time reflects when diagnosis was documented in both MDM as applicable and the Disposition within this note     Time User Action Codes Description Comment    8/21/2022  6:32 PM Benito Triplett Add [R07 9] Chest pain, unspecified     8/21/2022  6:41 PM Benito Triplett Add [E11 65,  Z79 4] Type 2 diabetes mellitus with hyperglycemia, with long-term current use of insulin [facility])       ED Disposition     ED Disposition   Discharge    Condition   Stable    Date/Time   Sun Aug 21, 2022  6:41 PM    Comment   Dakota Ibarra Sr  discharge to home/self care  Follow-up Information     Follow up With Specialties Details Why [address] MD Family Medicine Call in 1 day  [address]  [id number] [place] [address]  [phone]            Discharge Medication List as of 8/21/2022  6:43 PM      CONTINUE these medications which have CHANGED    Details   glucose blood (OneTouch Verio) test strip May substitute brand based on insurance coverage  Check glucose QID , Normal         CONTINUE these medications which have NOT CHANGED    Details   aspirin (ECOTRIN LOW STRENGTH) 81 mg EC tablet Take 1 tablet (81 mg total) by mouth daily, Starting Sun 5/8/2022, Normal      BD Insulin Syringe U-500 31G X 6MM 0 5 ML MISC USE 1 SYRINGE THREE TIMES A DAY FOR INJECTING INSULIN, Normal      carvedilol (COREG) 6 25 mg tablet TAKE 1 TABLET BY MOUTH TWO TIMES A DAY WITH MEALS, Normal      cyanocobalamin 1,000 mcg/mL Inject 1 mL (1,000 mcg total) into a muscle every 30 (thirty) days, Starting Tue 8/16/2022, No Print      cyclobenzaprine (FLEXERIL) 10 mg tablet Take 1 tablet (10 mg total) by mouth in the morning and 1 tablet (10 mg total) before bedtime  , Starting Tue 5/17/2022, Normal      ferrous sulfate 325 (65 Fe) mg tablet Take 1 tablet (325 mg total) by mouth daily with breakfast, Starting Tue 5/17/2022, Normal      fluticasone-umeclidinium-vilanterol (Trelegy Ellipta) 100-62 5-25 MCG/INH inhaler Inhale 1 puff  in the morning  Rinse mouth after use   , Starting Tue 5/17/2022, Until Thu 6/16/2022, Normal      furosemide (LASIX) 40 mg tablet TAKE 1 TABLET BY MOUTH DAILY, Normal      gabapentin (NEURONTIN) 100 mg capsule Take 1 capsule (100 mg total) by mouth 3 (three) times a day, Starting Sun 6/19/2022, Normal      insulin lispro protamine-insulin lispro (HumaLOG Mix 75/25) 100 units/mL Inject 45 Units under the skin 3 (three) times a day, Starting Tue 8/16/2022, Normal      ipratropium-albuterol (DUO-NEB) 0 5-2 5 mg/3 mL nebulizer solution Take 3 mL by nebulization 3 (three) times a day, Starting Sun 6/19/2022, Normal      lisinopril (ZESTRIL) 10 mg tablet TAKE 1 TABLET BY MOUTH DAILY, Normal      oxyCODONE 7 5 MG TABS Take 7 5 mg by mouth 2 (two) times a day as needed for severe pain Max Daily Amount: 15 mg, Starting Sun 6/19/2022, Normal      potassium chloride (K-DUR,KLOR-CON) 20 mEq tablet Take 1 tablet (20 mEq total) by mouth in the morning , Starting Sun 5/22/2022, Normal      simvastatin (ZOCOR) 40 mg tablet Take 1 tablet (40 mg total) by mouth in the morning , Starting Tue 5/17/2022, Normal      tamsulosin (FLOMAX) 0 4 mg Take 1 capsule (0 4 mg total) by mouth daily with dinner, Starting Sun 6/19/2022, Normal           No discharge procedures on file  PDMP Review       Value Time User    PDMP Reviewed  Yes 8/16/2022  1:15 AM Ekta Hernandes DO           ED Provider  Attending physically available and evaluated Herman Simmonds Sr    I managed the patient along with the ED Attending      Electronically Signed by         Alfred Cannon DO  08/21/22 0279

## 2022-08-22 ENCOUNTER — TELEPHONE (OUTPATIENT)
Dept: FAMILY MEDICINE CLINIC | Facility: CLINIC | Age: 77
End: 2022-08-22

## 2022-08-22 LAB
ATRIAL RATE: 208 BPM
ATRIAL RATE: 214 BPM
P AXIS: 90 DEGREES
QRS AXIS: 129 DEGREES
QRS AXIS: 154 DEGREES
QRSD INTERVAL: 142 MS
QRSD INTERVAL: 142 MS
QT INTERVAL: 354 MS
QT INTERVAL: 408 MS
QTC INTERVAL: 400 MS
QTC INTERVAL: 461 MS
T WAVE AXIS: 39 DEGREES
T WAVE AXIS: 70 DEGREES
VENTRICULAR RATE: 77 BPM
VENTRICULAR RATE: 77 BPM

## 2022-08-22 PROCEDURE — 93010 ELECTROCARDIOGRAM REPORT: CPT | Performed by: INTERNAL MEDICINE

## 2022-08-22 PROCEDURE — 99217 PR OBSERVATION CARE DISCHARGE MANAGEMENT: CPT | Performed by: STUDENT IN AN ORGANIZED HEALTH CARE EDUCATION/TRAINING PROGRAM

## 2022-08-22 NOTE — ASSESSMENT & PLAN NOTE
In May how the ready assessment of cardiac function and perfusion; was normal stress test   Current chest pain is reproducible and troponins have been normal   This is also in conjunction with chronic back pain and has affected way he is functioning  Will have acute pain management see patient  Meanwhile will continue with oxycodone 7 5 mg twice daily p r n  for severe pain

## 2022-08-22 NOTE — ASSESSMENT & PLAN NOTE
Wt Readings from Last 3 Encounters:   08/21/22 99 8 kg (220 lb)   08/16/22 100 kg (220 lb 7 4 oz)   07/05/22 99 8 kg (220 lb)     · Patient has been drinking more fluids lately; BNP elevated but does not demonstrate shortness of breath is able to lie flat on bed with out shortness of breath  · Lasix 40 mg intravenous x1

## 2022-08-22 NOTE — ASSESSMENT & PLAN NOTE
· Patient has problems with procuring medications due to limited availability of resources and is dependent on social security  · He lives with his son who also has difficulty taking care of his needs  · He gets his meals via meals on wheels and he is unsure regarding dietary compliance  · Patient with concerns for mobility and self-care; will get case management with physical therapy and occupational therapy involved  Notified that pt was unhappy with having to wait in the ED before getting a bed upstairs, stating he was not being taken care of, he was "just thrown in a room and forgotten"  I tried to explain the situation to the pt to which he replied with more cursing  I told him I would like him to sign an AMA form, he vehemently refused  After nursing staff tried to contact multiple family members, a [employer] was called, pt walked himself out of the ED and was taken home

## 2022-08-22 NOTE — ASSESSMENT & PLAN NOTE
Patient has problems with procuring medications due to limited availability of resources and is dependent on social security  In addition, he lives with his son who also has difficulty taking care of his needs  He gets his meals via meals on wheels and he is unsure regarding dietary compliance  Patient also claims he has been drinking water a lot lately  Patient with concerns for mobility and self-care; will get case management with physical therapy and occupational therapy involved

## 2022-08-22 NOTE — ED NOTES
Pt wants to leave AMA  When this RN ask pt why he wanted to leave, pt stated, "I was told I was going to get a room upstairs and now I'm not  I am not staying if I have to be in the ED " PA came to speak with pt  Pt left DAVID Guaman, BEATRICE  08/22/22 9098

## 2022-08-22 NOTE — ASSESSMENT & PLAN NOTE
Lab Results   Component Value Date    EGFR 39 08/21/2022    EGFR 39 08/21/2022    EGFR 45 08/16/2022    CREATININE 1 67 (H) 08/21/2022    CREATININE 1 69 (H) 08/21/2022    CREATININE 1 48 (H) 08/16/2022   Stable GFR currently 39

## 2022-08-22 NOTE — ED PROVIDER NOTES
History  Chief Complaint   Patient presents with    Chest Pain     Pt reports while waiting for his ride home his chest pain returned  States the chest pain has since subsided      Patient is a 68-year-old male, past medical history of CHF, COPD, diabetes, GERD, hypertension, hyperlipidemia, prior MI status post 3 stents, who presents to the emergency department for nausea and vomiting  Patient states he was recently seen in the emergency department for chest pain  Workup was negative  He was ultimately discharged  He states while waiting for a ride to go home he began to feel nauseous and vomited  He states the chest pain returned following that  Currently, patient states the chest pain involves the left lateral chest   Pain is worse with palpation  There are no other modifying factors  There are no other associated symptoms  He states he no longer feels nauseous  He denies any fevers, chills, diarrhea, back pain, or any other new or concerning symptoms  In addition, patient states that he is worried about his blood sugars  He states he does not have any means to check his blood sugar at home (he has run out of his test strips and has no money to get any more)  He states he has just been giving himself an insulin dose without checking his glucose level and has makes no adjustments to the amount of insulin given  He states he is not be able to procure any further diabetes medications supplies until next month  Prior to Admission Medications   Prescriptions Last Dose Informant Patient Reported? Taking?    BD Insulin Syringe U-500 31G X 6MM 0 5 ML MISC   No No   Sig: USE 1 SYRINGE THREE TIMES A DAY FOR INJECTING INSULIN   aspirin (ECOTRIN LOW STRENGTH) 81 mg EC tablet   No No   Sig: Take 1 tablet (81 mg total) by mouth daily   carvedilol (COREG) 6 25 mg tablet   No No   Sig: TAKE 1 TABLET BY MOUTH TWO TIMES A DAY WITH MEALS   cyanocobalamin 1,000 mcg/mL   No No   Sig: Inject 1 mL (1,000 mcg total) into a muscle every 30 (thirty) days   cyclobenzaprine (FLEXERIL) 10 mg tablet   No No   Sig: Take 1 tablet (10 mg total) by mouth in the morning and 1 tablet (10 mg total) before bedtime  ferrous sulfate 325 (65 Fe) mg tablet   No No   Sig: Take 1 tablet (325 mg total) by mouth daily with breakfast   fluticasone-umeclidinium-vilanterol (Trelegy Ellipta) 100-62 5-25 MCG/INH inhaler   No No   Sig: Inhale 1 puff  in the morning  Rinse mouth after use      furosemide (LASIX) 40 mg tablet   No No   Sig: TAKE 1 TABLET BY MOUTH DAILY   gabapentin (NEURONTIN) 100 mg capsule   No No   Sig: Take 1 capsule (100 mg total) by mouth 3 (three) times a day   insulin lispro protamine-insulin lispro (HumaLOG Mix 75/25) 100 units/mL   No No   Sig: Inject 45 Units under the skin 3 (three) times a day   ipratropium-albuterol (DUO-NEB) 0 5-2 5 mg/3 mL nebulizer solution   No No   Sig: Take 3 mL by nebulization 3 (three) times a day   lisinopril (ZESTRIL) 10 mg tablet   No No   Sig: TAKE 1 TABLET BY MOUTH DAILY   oxyCODONE 7 5 MG TABS   No No   Sig: Take 7 5 mg by mouth 2 (two) times a day as needed for severe pain Max Daily Amount: 15 mg   Patient not taking: Reported on 8/16/2022   potassium chloride (K-DUR,KLOR-CON) 20 mEq tablet   No No   Sig: Take 1 tablet (20 mEq total) by mouth in the morning  simvastatin (ZOCOR) 40 mg tablet   No No   Sig: Take 1 tablet (40 mg total) by mouth in the morning     tamsulosin (FLOMAX) 0 4 mg   No No   Sig: Take 1 capsule (0 4 mg total) by mouth daily with dinner      Facility-Administered Medications: None       Past Medical History:   Diagnosis Date    Abdominal pain     MARANDA [facility]) ([facility] ) 6/19/2018    Cardiac disease     [facility]congestive heart failure) ([facility])     COPD, severe ([place] [address] )     Coronary artery disease     DDD (degenerative disc disease), lumbar 9/1/2020    Diabetes mellitus ([place] [address] )     Dyspnea     GERD (gastroesophageal reflux disease)     Hyperlipidemia  Hypertension     MI ([facility]ocardial infarction) ([facility] [address] )     with 3 stents    Nodule of apex of right lung     JACQUELINE (obstructive sleep apnea)     Prostate cancer [facility])        Past Surgical History:   Procedure Laterality Date    ABDOMINAL SURGERY      exploratory    ANGIOPLASTY      3 stents    APPENDECTOMY      COLONOSCOPY      CT NEEDLE BIOPSY LUNG  11/3/2020    ESOPHAGOGASTRODUODENOSCOPY N/A 10/2/2017    Procedure: ESOPHAGOGASTRODUODENOSCOPY (EGD); Surgeon: Viky Flores MD;  [facility]: [facility]; Service: Gastroenterology    IR THORACENTESIS  11/3/2020    KNEE CARTILAGE SURGERY      OTHER SURGICAL HISTORY      stent placement    PROSTATE SURGERY      SKIN GRAFT      Basal cell CA back       Family History   Problem Relation Age of Onset    Heart disease Father     Other Father         Mesothelioma      I have reviewed and agree with the history as documented  E-Cigarette/Vaping    E-Cigarette Use Never User      E-Cigarette/Vaping Substances    Nicotine No     THC No     CBD No     Flavoring No     Other No     Unknown No      Social History     Tobacco Use    Smoking status: Former Smoker     Packs/day: 1 50     Years: 50 00     Pack years: 75 00     Start date: 38     Quit date: 2020     Years since quittin 1    Smokeless tobacco: Never Used   Vaping Use    Vaping Use: Never used   Substance Use Topics    Alcohol use: Never    Drug use: No        Review of Systems   Constitutional: Negative for chills and fever  Respiratory: Negative for cough and shortness of breath  Cardiovascular: Positive for chest pain  Gastrointestinal: Positive for nausea and vomiting  Negative for abdominal pain and diarrhea  All other systems reviewed and are negative        Physical Exam  ED Triage Vitals [22]   Temperature Pulse Respirations Blood Pressure SpO2   98 2 °F (36 8 °C) 97 17 (!) 176/79 96 %      Temp Source Heart Rate Source Patient Position - Orthostatic VS BP Location FiO2 (%)   Oral Monitor Lying Right arm --      Pain Score       --             Orthostatic Vital Signs  Vitals:    08/21/22 2040   BP: (!) 176/79   Pulse: 97   Patient Position - Orthostatic VS: Lying       Physical Exam  Vitals and nursing note reviewed  Constitutional:       General: He is not in acute distress  Appearance: He is well-developed  He is not diaphoretic  HENT:      Head: Normocephalic and atraumatic  Right Ear: External ear normal       Left Ear: External ear normal       Nose: Nose normal    Eyes:      General: Lids are normal  No scleral icterus  Cardiovascular:      Rate and Rhythm: Normal rate and regular rhythm  Heart sounds: Normal heart sounds  No murmur heard  No friction rub  No gallop  Pulmonary:      Effort: Pulmonary effort is normal  No respiratory distress  Breath sounds: Normal breath sounds  No wheezing or rales  Chest:          Comments: Reproducible left-sided chest wall tenderness to palpation  Abdominal:      Palpations: Abdomen is soft  Tenderness: There is no abdominal tenderness  There is no guarding or rebound  Musculoskeletal:         General: No deformity  Normal range of motion  Cervical back: Normal range of motion and neck supple  Skin:     General: Skin is warm and dry  Neurological:      General: No focal deficit present  Mental Status: He is alert     Psychiatric:         Mood and Affect: Mood normal          Behavior: Behavior normal          ED Medications  Medications   sodium chloride (PF) 0 9 % injection 3 mL (has no administration in time range)   aspirin (ECOTRIN LOW STRENGTH) EC tablet 81 mg (has no administration in time range)   carvedilol (COREG) tablet 6 25 mg (has no administration in time range)   cyanocobalamin injection 1,000 mcg (has no administration in time range)   cyclobenzaprine (FLEXERIL) tablet 10 mg (has no administration in time range)   ferrous sulfate tablet 325 mg (has no administration in time range)   fluticasone-vilanterol (BREO ELLIPTA) 100-25 mcg/inh inhaler 1 puff (has no administration in time range)   umeclidinium bromide (INCRUSE ELLIPTA) 62 5 mcg/inh inhaler AEPB 1 puff (has no administration in time range)   furosemide (LASIX) tablet 40 mg (has no administration in time range)   furosemide (LASIX) injection 40 mg (has no administration in time range)   gabapentin (NEURONTIN) capsule 100 mg (has no administration in time range)   insulin aspart protamine-insulin aspart (NovoLOG 70/30) 100 units/mL subcutaneous injection 45 Units (has no administration in time range)   lisinopril (ZESTRIL) tablet 10 mg (has no administration in time range)   oxyCODONE (ROXICODONE) IR tablet 7 5 mg (has no administration in time range)   potassium chloride (K-DUR,KLOR-CON) CR tablet 20 mEq (has no administration in time range)   tamsulosin (FLOMAX) capsule 0 4 mg (has no administration in time range)   acetaminophen (TYLENOL) tablet 650 mg (has no administration in time range)   docusate sodium (COLACE) capsule 100 mg (has no administration in time range)   ondansetron (ZOFRAN) injection 4 mg (has no administration in time range)   aluminum-magnesium hydroxide-simethicone (MYLANTA) oral suspension 30 mL (has no administration in time range)   enoxaparin (LOVENOX) subcutaneous injection 40 mg (has no administration in time range)   insulin lispro (HumaLOG) 100 units/mL subcutaneous injection 1-6 Units (has no administration in time range)   insulin lispro (HumaLOG) 100 units/mL subcutaneous injection 1-5 Units (has no administration in time range)       Diagnostic Studies  Results Reviewed     Procedure Component Value Units Date/Time    HS Troponin 0hr (reflex protocol) [[id number]]  (Normal) Collected: 08/21/22 2158    Lab Status: Final result Specimen: Blood from Arm, Right Updated: 08/21/22 2231     hs TnI 0hr 14 ng/L     Basic metabolic panel [[id number]]  (Abnormal) Collected: 08/21/22 2158    Lab Status: Final result Specimen: Blood from Arm, Right Updated: 08/21/22 2221     Sodium 136 mmol/L      Potassium 4 2 mmol/L      Chloride 104 mmol/L      CO2 31 mmol/L      ANION GAP 1 mmol/L      BUN 23 mg/dL      Creatinine 1 67 mg/dL      Glucose 355 mg/dL      Calcium 8 1 mg/dL      eGFR 39 ml/min/1 73sq m     Narrative:      [place] guidelines for Chronic Kidney Disease (CKD):     Stage 1 with normal or high GFR (GFR > 90 mL/min/1 73 square meters)    Stage 2 Mild CKD (GFR = 60-89 mL/min/1 73 square meters)    Stage 3A Moderate CKD (GFR = 45-59 mL/min/1 73 square meters)    Stage 3B Moderate CKD (GFR = 30-44 mL/min/1 73 square meters)    Stage 4 Severe CKD (GFR = 15-29 mL/min/1 73 square meters)    Stage 5 End Stage CKD (GFR <15 mL/min/1 73 square meters)  Note: GFR calculation is accurate only with a steady state creatinine    Lipase [[id number]]  (Normal) Collected: 08/21/22 2158    Lab Status: Final result Specimen: Blood from Arm, Right Updated: 08/21/22 2221     Lipase 168 u/L     CBC and differential [[id number]]  (Abnormal) Collected: 08/21/22 2158    Lab Status: Final result Specimen: Blood from Arm, Right Updated: 08/21/22 2208     WBC 9 37 Thousand/uL      RBC 4 27 Million/uL      Hemoglobin 11 4 g/dL      Hematocrit 36 8 %      MCV 86 fL      MCH 26 7 pg      MCHC 31 0 g/dL      RDW 16 9 %      MPV 11 0 fL      Platelets 331 Thousands/uL      nRBC 0 /100 WBCs      Neutrophils Relative 81 %      Immat GRANS % 1 %      Lymphocytes Relative 8 %      Monocytes Relative 9 %      Eosinophils Relative 1 %      Basophils Relative 0 %      Neutrophils Absolute 7 66 Thousands/µL      Immature Grans Absolute 0 06 Thousand/uL      Lymphocytes Absolute 0 72 Thousands/µL      Monocytes Absolute 0 85 Thousand/µL      Eosinophils Absolute 0 06 Thousand/µL      Basophils Absolute 0 02 Thousands/µL                  CT chest wo contrast    (Results Pending) Procedures  ECG 12 Lead Documentation Only    Date/Time: 8/22/2022 1:44 AM  Performed by: Jesus Russo DO  Authorized by: Jesus Russo DO     ECG reviewed by me, the ED Provider: yes    Patient location:  ED  Interpretation:     Interpretation: abnormal    Rate:     ECG rate:  77    ECG rate assessment: normal    Rhythm:     Rhythm: atrial fibrillation    Ectopy:     Ectopy: none    QRS:     QRS axis:  Right    QRS intervals: Wide  Conduction:     Conduction: abnormal      Abnormal conduction: complete RBBB            ED Course  ED Course as of 08/22/22 0151   Sun Aug 21, 2022   2209 WBC: 9 37   2209 Hemoglobin(!): 11 4   2224 Lipase: 168   2224 Creatinine(!): 1 67  Baseline   2240 Discuss with SLIM  Accepts admission             HEART Risk Score    Flowsheet Row Most Recent Value   Heart Score Risk Calculator    History 0 Filed at: 08/22/2022 0150   ECG 1 Filed at: 08/22/2022 0150   Age 2 Filed at: 08/22/2022 0150   Risk Factors 2 Filed at: 08/22/2022 0150   Troponin 1 Filed at: 08/22/2022 0150   HEART Score 6 Filed at: 08/22/2022 0150                                MDM  Number of Diagnoses or Management Options  Diabetes [facility])  Medication management  Musculoskeletal chest pain  Diagnosis management comments: Patient is a 78 y o  male who presented to the ED for left-sided reproducible chest pain  Patient's chest pain is most likely musculoskeletal  Based on history and physical, there is low suspicion for ACS, acute PE, pericarditis / myocarditis, thoracic aortic dissection, pneumothorax, pneumonia or other acute infectious process  Presentation not consistent with other acute, emergent causes of chest pain at this time  As far as patient's medication problems, will likely need case management assistance  Plan: Labs, EKG, reassessment  Will discuss with SLIM about possible admission  Portions of the record may have been created with voice recognition software   Occasional wrong word or "sound a like" substitutions may have occurred due to the inherent limitations of voice recognition software  Read the chart carefully and recognize, using context, where substitutions have occurred  Amount and/or Complexity of Data Reviewed  Clinical lab tests: ordered and reviewed  Tests in the radiology section of CPT®: ordered and reviewed  Tests in the medicine section of CPT®: ordered  Review and summarize past medical records: yes    Risk of Complications, Morbidity, and/or Mortality  Presenting problems: moderate  Diagnostic procedures: low  Management options: low    Patient Progress  Patient progress: stable      Disposition  Final diagnoses:   Musculoskeletal chest pain   Diabetes ([facility] [address] )   Medication management     Time reflects when diagnosis was documented in both MDM as applicable and the Disposition within this note     Time User Action Codes Description Comment    8/21/2022 10:26 PM Cheikh Flaherty Add [R07 9] Chest pain     8/21/2022 10:42 PM Cheikh Flaherty Remove [R07 9] Chest pain     8/21/2022 10:42 PM Cheikh Flaherty Add [R07 89] Musculoskeletal chest pain     8/21/2022 10:42 PM Cheikh Flaherty Add [E11 9] Diabetes ([facility] [address] )     8/22/2022  1:46 AM Cheikh Flaherty Add [Z79 899] Medication management       ED Disposition     ED Disposition   Admit    Condition   Stable    Date/Time   Mon Aug 22, 2022  1:45 AM    Comment   Case was discussed with Dr Aruna Covington and the patient's admission status was agreed to be Admission Status: observation status to the service of Dr Aruna Covington              Follow-up Information     Follow up With Specialties Details Why Contact Info Additional Vinod Dunn MD Family Medicine   [address]  [address] [place] [address] Emergency Department Emergency Medicine  As needed [address] [id number] [address] Emergency Department, [address] [facility], [address] [facility], [address]    [address] [facility] Cardiology   [address] [facility] [address] [facility] [place] [facility], [address], [facility], [address] [facility], [address]          Current Discharge Medication List      CONTINUE these medications which have NOT CHANGED    Details   aspirin (ECOTRIN LOW STRENGTH) 81 mg EC tablet Take 1 tablet (81 mg total) by mouth daily  Qty: 30 tablet, Refills: 2    Associated Diagnoses: Essential hypertension; Coronary artery disease involving native coronary artery of native heart without angina pectoris      BD Insulin Syringe U-500 31G X 6MM 0 5 ML MISC USE 1 SYRINGE THREE TIMES A DAY FOR INJECTING INSULIN  Qty: 100 each, Refills: 5    Associated Diagnoses: Type 2 diabetes mellitus with hyperglycemia, with long-term current use of insulin ([facility])      carvedilol (COREG) 6 25 mg tablet TAKE 1 TABLET BY MOUTH TWO TIMES A DAY WITH MEALS  Qty: 180 tablet, Refills: 0    Associated Diagnoses: Essential hypertension; Coronary artery disease involving native coronary artery of native heart without angina pectoris      cyanocobalamin 1,000 mcg/mL Inject 1 mL (1,000 mcg total) into a muscle every 30 (thirty) days  Qty: 1 mL, Refills: 0    Associated Diagnoses: B12 deficiency      cyclobenzaprine (FLEXERIL) 10 mg tablet Take 1 tablet (10 mg total) by mouth in the morning and 1 tablet (10 mg total) before bedtime  Qty: 60 tablet, Refills: 5    Associated Diagnoses: Chronic bilateral low back pain, unspecified whether sciatica present      ferrous sulfate 325 (65 Fe) mg tablet Take 1 tablet (325 mg total) by mouth daily with breakfast  Qty: 30 tablet, Refills: 5    Associated Diagnoses: Stage 3a chronic kidney disease (HCC)      fluticasone-umeclidinium-vilanterol (Trelegy Ellipta) 100-62 5-25 MCG/INH inhaler Inhale 1 puff  in the morning   Rinse mouth after use  Ernestine Mortono: 1 each, Refills: 5    Associated Diagnoses: COPD exacerbation ([facility])      furosemide (LASIX) 40 mg tablet TAKE 1 TABLET BY MOUTH DAILY  Qty: 90 tablet, Refills: 0    Associated Diagnoses: Acute on chronic diastolic (congestive) heart failure ([facility])      gabapentin (NEURONTIN) 100 mg capsule Take 1 capsule (100 mg total) by mouth 3 (three) times a day  Qty: 90 capsule, Refills: 0    Associated Diagnoses: Chronic bilateral low back pain without sciatica      insulin lispro protamine-insulin lispro (HumaLOG Mix 75/25) 100 units/mL Inject 45 Units under the skin 3 (three) times a day  Qty: 40 mL, Refills: 0    Associated Diagnoses: Type 2 diabetes mellitus with hypoglycemia without coma, with long-term current use of insulin ([facility])      ipratropium-albuterol (DUO-NEB) 0 5-2 5 mg/3 mL nebulizer solution Take 3 mL by nebulization 3 (three) times a day  Qty: 270 mL, Refills: 0    Associated Diagnoses: Chronic respiratory failure with hypoxia ([facility])      lisinopril (ZESTRIL) 10 mg tablet TAKE 1 TABLET BY MOUTH DAILY  Qty: 90 tablet, Refills: 0    Associated Diagnoses: Essential hypertension      oxyCODONE 7 5 MG TABS Take 7 5 mg by mouth 2 (two) times a day as needed for severe pain Max Daily Amount: 15 mg  Qty: 10 tablet, Refills: 0    Associated Diagnoses: Acute cystitis without hematuria      potassium chloride (K-DUR,KLOR-CON) 20 mEq tablet Take 1 tablet (20 mEq total) by mouth in the morning  Qty: 17 tablet, Refills: 0    Comments: Take this pill when you take your lasix  Associated Diagnoses: Chronic respiratory failure with hypoxia and hypercapnia ([facility])      simvastatin (ZOCOR) 40 mg tablet Take 1 tablet (40 mg total) by mouth in the morning    Qty: 30 tablet, Refills: 5    Associated Diagnoses: Hypercholesterolemia      tamsulosin (FLOMAX) 0 4 mg Take 1 capsule (0 4 mg total) by mouth daily with dinner  Qty: 30 capsule, Refills: 0    Associated Diagnoses: Acute cystitis without hematuria No discharge procedures on file  PDMP Review       Value Time User    PDMP Reviewed  Yes 8/16/2022  1:15 AM Milta Halsted, DO           ED Provider  Attending physically available and evaluated Asia Jimenez Sr    I managed the patient along with the ED Attending      Electronically Signed by         Roseann Ortiz DO  08/22/22 [address],   08/22/22 [id number]

## 2022-08-22 NOTE — ASSESSMENT & PLAN NOTE
Lab Results   Component Value Date    HGBA1C 10 3 (H) 07/06/2022     Continue insulin 70 30 (75/25) 45 units t i d  with meals; continue insulin sliding scale per protocol    Recent Labs     08/21/22  1507   POCGLU 375*       Blood Sugar Average: Last 72 hrs:

## 2022-08-22 NOTE — H&P
[address] [facility]  H&P- Louie Jensen   1945, 78 y o  male MRN: [id number]  Unit/Bed#: ED 14 Encounter: [id number]  Primary Care Provider: Staci Garvey MD   Date and time admitted to hospital: 8/21/2022  8:40 PM    * Medication management  Assessment & Plan  Patient has problems with procuring medications due to limited availability of resources and is dependent on social security  In addition, he lives with his son who also has difficulty taking care of his needs  He gets his meals via meals on wheels and he is unsure regarding dietary compliance  Patient also claims he has been drinking water a lot lately  Patient with concerns for mobility and self-care; will get case management with physical therapy and occupational therapy involved  Chronic left-sided low back pain without sciatica  Assessment & Plan  Also June, was discharged with oxycodone 7 5 mg twice daily p r n  for severe pain  This is also in conjunction with complaints of chest pain which is also musculoskeletal   It is reproducible  As per patient, was advised that he needs to see pain management  While in hospital, will continue with oxycodone 7 5 mg twice daily p r n  for severe pain  Acetaminophen 650 mg Q 6 p r n  for mild pain  Will also get Pain Management involved to give us an opinion regarding out patient treatment of pain  Physical and occupational therapy with case management consult  Pleural effusion on right  Assessment & Plan  Pleural effusion on right is currently stable  Noted elevated BNP compared to previous however patient is not in distress  Continue Brio and Incruse  Will administer Lasix 40 mg intravenous x1  Input and output with daily weights  Continue Lasix 40 mg daily by mouth as per outpatient medication  Adenocarcinoma of lung [facility])  Assessment & Plan  Currently on inhalers    May CT this year showed the following findings: LUNGS:  Chronic right lung volume loss with linear scarring in the right upper lobe and lingula is again seen  Unchanged right basilar round atelectasis  Scattered airspace opacities within the left upper lobe are seen  Unchanged pulmonary emphysema is seen  Impression as follows:Airspace opacities within the left upper lobe which may represent  Recommend short-term follow-up chest CT scan in 3 months to evaluate for resolution  Would order CT of the chest without contrast for follow-up  Chest pain, musculoskeletal  Assessment & Plan  In May how the ready assessment of cardiac function and perfusion; was normal stress test   Current chest pain is reproducible and troponins have been normal   This is also in conjunction with chronic back pain and has affected way he is functioning  Will have acute pain management see patient  Meanwhile will continue with oxycodone 7 5 mg twice daily p r n  for severe pain  CKD (chronic kidney disease) stage 3, GFR 30-59 ml/min [facility])  Assessment & Plan  Lab Results   Component Value Date    EGFR 39 08/21/2022    EGFR 39 08/21/2022    EGFR 45 08/16/2022    CREATININE 1 67 (H) 08/21/2022    CREATININE 1 69 (H) 08/21/2022    CREATININE 1 48 (H) 08/16/2022   Stable GFR currently 39  CAD (coronary artery disease)  Assessment & Plan  Continue Coreg 6 25 twice daily  Continue aspirin 81 mg daily  Troponins have been done and is stable without elevation  Recent stress testing did not show abnormalities  May 2022:  Stress ECG: The ECG was not diagnostic due to pharmacological (vasodilator) stress    Perfusion: There is a left ventricular perfusion defect that is medium in size present in the basal to mid inferior location(s) that is paradoxical     Stress Function: Left ventricular function post-stress is normal  Post-stress ejection fraction is 61 %      No ischemia or scar    LVEF61%         Diabetic neuropathy [facility])  Assessment & Plan  Lab Results   Component Value Date    HGBA1C 10 3 (H) 07/06/2022 Continue insulin 70 30 (75/25) 45 units t i d  with meals; continue insulin sliding scale per protocol  Recent Labs     08/21/22  1507   POCGLU 375*       Blood Sugar Average: Last 72 hrs:      Essential hypertension  Assessment & Plan  On Coreg 6 25 twice daily  Lasix 40 mg daily  Zestril 10 mg daily  Chronic diastolic heart failure (HCC)  Assessment & Plan  Wt Readings from Last 3 Encounters:   08/21/22 99 8 kg (220 lb)   08/16/22 100 kg (220 lb 7 4 oz)   07/05/22 99 8 kg (220 lb)     Patient has been drinking more fluids lately; BNP elevated but does not demonstrate shortness of breath is able to lie flat on bed with out shortness of breath  Lasix 40 mg intravenous x1  Input and output  Daily-weights  COPD, severe [facility])  Assessment & Plan  Patient without shortness of breath or distress  Chest pain is musculoskeletal   Continue Brio and Incruse  Patient has nasal cannula oxygen at night  Uses oxygen as well on a p r n  basis  HLD (hyperlipidemia)  Assessment & Plan  Continue Zocor  VTE Prophylaxis: Enoxaparin (Lovenox)  / sequential compression device   Code Status: Level 1 - Full Code as discussed with patient  POLST: There is no POLST form on file for this patient (pre-hospital)    Anticipated Length of Stay:  Patient will be admitted on an Observation basis with an anticipated length of stay of  less than 2 midnights  Justification for Hospital Stay: Please see detailed plans noted above  For may require extension depending on patient needs and functioning  Would require physical and occupational therapy consult with case management to address current functioning and medication concerns  Pain management      Chief Complaint:     Recurrent chest discomfort  History of Present Illness:  Horacio Crowe  is a 78 y o  male who has past medical history significant for recurrent chest discomfort which has been worked up for any cardiac cause as he has a history of CAD hyperlipidemia and diabetes and hypertension  Noted is that in May his pharmacologic stress test was deemed normal   He also has a good ejection fraction  He has a history of COPD, degenerative disc disease, chronic diastolic heart failure, obstructive sleep apnea, on and off chronic respiratory failure and may need nasal cannula oxygen especially at night, chronic left-sided low back pain, CKD stage 3b, history of prostate CA, peripheral artery disease, chronic edema  The patient came in earlier in the emergency room complaining of chest pain which he says usually as the front chest and it is sharp, across the chest and reproducible  Patient denies having any shortness of breath  He also has chronic back pain and claims to have degenerative disc disease  This chest pain as well as the back pain limits his activity  Earlier, he was then worked up for a cardiac cause however troponins have been normal   He was then sent home  While waiting for transportation, he complained again of recurrent chest discomfort and sign himself in the ER  Patient demonstrates frustration dealing with said chest discomfort  Review of history shows that the patient was given oxycodone 7 5 mg twice daily the past and that his primary care physician would not want to renew such medications unless he is seen by pain management  During the course of interview, the patient brings concerns that he could not take care of himself at home due to the fact that he has limited resources relying on social security  That said he has no way to check his blood sugars that have been running approximately 160 even as high as 300 or 600 at times  He is also unsure of his insulin supply at home  Patient gets his meals from [employer] and is dependent on what ever they gave him  He was able to eat 2 sandwiches this afternoon  He also ate a cheese [place] for the morning  He claims to be drinking water without any regard to amount    He denies having any fever, cough or cold  No other systemic symptoms  No abdominal pain  Has nausea but no vomiting  Review of chest CT done in May showed airspace disease recommending a repeat CT follow-up in 3 months  Currently, patient is sitting on bed appearing comfortable  Chest pain is not as bad as before  This is reproducible  Patient is able to speak in long sentences without any dyspnea or conversational distress  Review of Systems:    Constitutional:  Denies fever or chills   Eyes:  Denies change in visual acuity   HENT:  Denies nasal congestion or sore throat   Respiratory:  Denies cough or shortness of breath   Cardiovascular:  Chest pain as above  GI:  Denies abdominal pain, nausea, vomiting, bloody stools or diarrhea   :  Denies dysuria   Musculoskeletal:  Denies back pain or joint pain   Integument:  Denies rash   Neurologic:  Denies headache, focal weakness or sensory changes   Endocrine:  Denies polyuria or polydipsia   Lymphatic:  Denies swollen glands   Psychiatric:  Denies depression or anxiety     Past Medical and Surgical History:   Past Medical History:   Diagnosis Date    Abdominal pain     MARANDA (acute kidney injury) ([facility] ) 6/19/2018    Cardiac disease     [facility]     COPD, severe ([facility] )     Coronary artery disease     DDD (degenerative disc disease), lumbar 9/1/2020    Diabetes mellitus ([facility] )     Dyspnea     GERD (gastroesophageal reflux disease)     Hyperlipidemia     Hypertension     MI (myocardial infarction) ([facility] [facility] )     with 3 stents    Nodule of apex of right lung     JACQUELINE (obstructive sleep apnea)     Prostate cancer [facility])      Past Surgical History:   Procedure Laterality Date    ABDOMINAL SURGERY      exploratory    ANGIOPLASTY      3 stents    APPENDECTOMY      COLONOSCOPY  2015    CT NEEDLE BIOPSY LUNG  11/3/2020    ESOPHAGOGASTRODUODENOSCOPY N/A 10/2/2017    Procedure: ESOPHAGOGASTRODUODENOSCOPY (EGD);   Surgeon: Kevin Drake Luigi Dakin, MD;  Location: [facility]; Service: Gastroenterology    IR THORACENTESIS  11/3/2020    KNEE CARTILAGE SURGERY      OTHER SURGICAL HISTORY      stent placement    PROSTATE SURGERY      SKIN GRAFT      Basal cell CA back       Meds/Allergies:  (Not in a hospital admission)      Allergies: Allergies   Allergen Reactions    Crestor [Rosuvastatin] Other (See Comments)     Unknown      Metformin GI Intolerance     History:  Marital Status:    Occupation:  He is retired  The patient's wife  2 years ago  Patient Pre-hospital Living Situation:  Lives at home with son  Patient Pre-hospital Level of Mobility:  Ambulatory  Patient Pre-hospital Diet Restrictions:  Should be regular cardiac and diabetic  Unsure of compliance  Substance Use History:   Social History     Substance and Sexual Activity   Alcohol Use Never     Social History     Tobacco Use   Smoking Status Former Smoker    Packs/day: 1 50    Years: 50 00    Pack years: 75 00    Start date: 38    Quit date: 2020    Years since quittin 1   Smokeless Tobacco Never Used     Social History     Substance and Sexual Activity   Drug Use No       Family History:  Family History   Problem Relation Age of Onset    Heart disease Father     Other Father         Mesothelioma        Physical Exam:     Vitals:   Blood Pressure: (!) 176/79 (22)  Pulse: 97 (22)  Temperature: 98 2 °F (36 8 °C) (22)  Temp Source: Oral (22)  Respirations: 17 (22)  SpO2: 96 % (22)    Constitutional:  Well developed, well nourished, no acute distress, non-toxic appearance   Eyes:  PERRL, conjunctiva normal   HENT:  Atraumatic, external ears normal, nose normal, oropharynx moist, no pharyngeal exudates   Neck- normal range of motion, no tenderness, supple   Respiratory:  No respiratory distress, normal breath sounds, no rales, no wheezing with decreased breath sounds at the right base   Cardiovascular:  Normal rate, normal rhythm, no murmurs, no gallops, no rubs   GI:  Soft, nondistended, normal bowel sounds, nontender, no organomegaly, no mass, no rebound, no guarding   :  No costovertebral angle tenderness   Musculoskeletal:  No edema, no tenderness, no deformities  Back- no tenderness  Integument:  Well hydrated, no rash   Lymphatic:  No lymphadenopathy noted   Neurologic:  Alert &awake, communicative, CN 2-12 normal, normal motor function, normal sensory function, no focal deficits noted   Psychiatric:  Speech and behavior appropriate       Lab Results: I have personally reviewed pertinent reports  Results from last 7 days   Lab Units 08/21/22 2158   WBC Thousand/uL 9 37   HEMOGLOBIN g/dL 11 4*   HEMATOCRIT % 36 8   PLATELETS Thousands/uL 121*   NEUTROS PCT % 81*   LYMPHS PCT % 8*   MONOS PCT % 9   EOS PCT % 1     Results from last 7 days   Lab Units 08/21/22  2158 08/21/22  1526 08/15/22  2203 08/15/22  2201   POTASSIUM mmol/L 4 2 4 6   < >  --    CHLORIDE mmol/L 104 104   < >  --    CO2 mmol/L 31 28   < >  --    CO2, I-STAT mmol/L  --   --   --  35*   BUN mg/dL 23 21   < >  --    CREATININE mg/dL 1 67* 1 69*   < >  --    CALCIUM mg/dL 8 1* 8 1*   < >  --    ALK PHOS U/L  --  75   < >  --    ALT U/L  --  17   < >  --    AST U/L  --  19   < >  --    GLUCOSE, ISTAT mg/dl  --   --   --  71    < > = values in this interval not displayed  Results from last 7 days   Lab Units 08/15/22  2203   INR  0 94       EKG:  Right bundle branch block, no acute ST T-wave elevation or depressions  But baseline EKG is abnormal with no significant change from previous  Imaging: I have personally reviewed pertinent reports  XR chest 1 view portable    Result Date: 8/16/2022  Narrative: CHEST INDICATION:   sepsis  COMPARISON:  6/9/2022; CT from 5/2/2022; x-ray from 4/21/2022 EXAM PERFORMED/VIEWS:  XR CHEST PORTABLE FINDINGS: Cardiomediastinal silhouette appears unremarkable   There is evidence of right pleural effusion and some volume loss in the right hemithorax  Bandlike atelectasis is noted in the right upper lobe  Right base opacity is similar to the prior examination  No pneumothorax  The left lung appears clear  No  pneumothorax or pleural effusion  Osseous structures appear within normal limits for patient age  Impression: Pleural-parenchymal density at the right lung base has mildly worsened as compared to the studies of June and April  Increased effusion and/or new pneumonia could be present superimposed on more chronic changes noted previously  Follow-up is advised There is continued volume loss in the right hemithorax  The left lung appears clear  The study was marked in [facility] for immediate notification  Workstation performed: [id number]     CT head without contrast    Result Date: 8/16/2022  Narrative: CT BRAIN - WITHOUT CONTRAST INDICATION:   Mental status change, unknown cause fall unwitnessed altered  COMPARISON:  CT of the head on March 29, 2022  TECHNIQUE:  CT examination of the brain was performed  In addition to axial images, sagittal and coronal 2D reformatted images were created and submitted for interpretation  Radiation dose length product (DLP) for this visit:  896 99 mGy-cm   This examination, like all CT scans performed in the [facility], was performed utilizing techniques to minimize radiation dose exposure, including the use of iterative  reconstruction and automated exposure control  IMAGE QUALITY:  Diagnostic  FINDINGS: PARENCHYMA: Decreased attenuation is noted in periventricular and subcortical white matter demonstrating an appearance that is statistically most likely to represent mild microangiopathic change  No CT signs of acute infarction  No intracranial mass, mass effect or midline shift  No acute parenchymal hemorrhage  VENTRICLES AND EXTRA-AXIAL SPACES:  Normal for the patient's age   VISUALIZED ORBITS AND PARANASAL SINUSES: Unremarkable  CALVARIUM AND EXTRACRANIAL SOFT TISSUES:  Normal      Impression: No acute intracranial hemorrhage, midline shift, or mass effect  Workstation performed: [id number]     CT abdomen pelvis w contrast    Result Date: 8/16/2022  Narrative: CT ABDOMEN AND PELVIS WITH IV CONTRAST INDICATION:   Abdominal pain, acute, nonlocalized Abdominal pain, generalized  COMPARISON:  7/5/2022  TECHNIQUE:  CT examination of the abdomen and pelvis was performed  Axial, sagittal, and coronal 2D reformatted images were created from the source data and submitted for interpretation  Radiation dose length product (DLP) for this visit:  1017 17 mGy-cm   This examination, like all CT scans performed in the [facility], was performed utilizing techniques to minimize radiation dose exposure, including the use of iterative reconstruction and automated exposure control  IV Contrast:  85 mL of iohexol (OMNIPAQUE) Enteric Contrast:  Enteric contrast was not administered  FINDINGS: ABDOMEN LOWER CHEST:  Unchanged right lower lobe volume loss  Unchanged right pleural effusion  Unchanged right basilar rounded atelectasis  LIVER/BILIARY TREE:  Unremarkable  GALLBLADDER:  No calcified gallstones  No pericholecystic inflammatory change  SPLEEN:  Unremarkable  PANCREAS:  Unremarkable  ADRENAL GLANDS:  Unremarkable  KIDNEYS/URETERS:  Unremarkable  No hydronephrosis  STOMACH AND BOWEL: Unchanged periampullary duodenal diverticulum  There is colonic diverticulosis without evidence of acute diverticulitis  Focal left upper lobe small bowel ileus  No evidence for small bowel obstruction  No transition point  The remainder of the visualized bowel is unremarkable  APPENDIX:  No findings to suggest appendicitis  ABDOMINOPELVIC CAVITY:  No ascites  No pneumoperitoneum  No lymphadenopathy  VESSELS:  Unremarkable for patient's age  PELVIS REPRODUCTIVE ORGANS: Prostatic brachytherapy   URINARY BLADDER:  Decompressed by Cook catheter whose tip and balloon are seen within the bladder  Small volume of air within the bladder likely from catheter insertion  ABDOMINAL WALL/INGUINAL REGIONS:  There is a small fat-containing umbilical hernia  OSSEOUS STRUCTURES:  No acute fracture or destructive osseous lesion  Impression: Focal left upper lobe small bowel ileus  No evidence for small bowel obstruction  No transition point  The remainder of the visualized bowel is unremarkable  Unchanged right lower lobe findings as above  Workstation performed: [id number]     US bedside procedure    Result Date: 8/21/2022  Narrative: 1 2 840 [id number]  2 446 246 [id number]        ** Please Note: Dragon 360 Dictation voice to text software was used in the creation of this document   **

## 2022-08-22 NOTE — ASSESSMENT & PLAN NOTE
Continue Coreg 6 25 twice daily  Continue aspirin 81 mg daily  Troponins have been done and is stable without elevation  Recent stress testing did not show abnormalities  May 2022:  Stress ECG: The ECG was not diagnostic due to pharmacological (vasodilator) stress    Perfusion: There is a left ventricular perfusion defect that is medium in size present in the basal to mid inferior location(s) that is paradoxical     Stress Function: Left ventricular function post-stress is normal  Post-stress ejection fraction is 61 %      No ischemia or scar    LVEF61%

## 2022-08-22 NOTE — ED ATTENDING ATTESTATION
Final Diagnosis:  1  Musculoskeletal chest pain    2  Diabetes ([address] )           Jessica Flor MD, saw and evaluated the patient  All available labs and X-rays were ordered by me or the resident and have been reviewed by myself  I discussed the patient with the resident / non-physician and agree with the resident's / non-physician practitioner's findings and plan as documented in the resident's / non-physician practicitioner's note, except where noted  At this point, I agree with the current assessment done in the ED  I was present during key portions of all procedures performed unless otherwise stated  Chief Complaint   Patient presents with    Chest Pain     Pt reports while waiting for his ride home his chest pain returned  States the chest pain has since subsided      This is a 78 y o  male presenting for evaluation of musculoskeletal pain  I saw him an hour ago or so  He had a negative workup for same  While waiting for an uber ride in the waiting room, he had return of CP that has since resolved  +epigastirc pain too  It's completely resolved  No vomiting  No diaphoresis  No dizziness/LH  No SOB    PMH:   has a past medical history of Abdominal pain, MARANDA (acute kidney injury) ([address] ) (6/19/2018), Cardiac disease, CHF (congestive heart failure) ([address] ), COPD, severe ([address] ), Coronary artery disease, DDD (degenerative disc disease), lumbar (9/1/2020), Diabetes mellitus ([address] ), Dyspnea, GERD (gastroesophageal reflux disease), Hyperlipidemia, Hypertension, MI (myocardial infarction) ([address] ), Nodule of apex of right lung, JACQUELINE (obstructive sleep apnea), and Prostate cancer ([address] )  PSH:   has a past surgical history that includes Prostate surgery; Abdominal surgery; Angioplasty; Esophagogastroduodenoscopy (N/A, 10/2/2017); Appendectomy; Skin graft; Knee cartilage surgery; Other surgical history; Colonoscopy (2015); IR thoracentesis (11/3/2020); and CT needle biopsy lung (11/3/2020)      Social:  Social History     Substance and Sexual Activity   Alcohol Use Never     Social History     Tobacco Use   Smoking Status Former Smoker    Packs/day: 1 50    Years: 50 00    Pack years: 75 00    Start date: 38    Quit date: 2020    Years since quittin 1   Smokeless Tobacco Never Used     Social History     Substance and Sexual Activity   Drug Use No     PE:  Vitals:    22   BP: (!) 176/79   BP Location: Right arm   Pulse: 97   Resp: 17   Temp: 98 2 °F (36 8 °C)   TempSrc: Oral   SpO2: 96%   General: VSS, NAD, awake, alert  Well-nourished, well-developed  Appears stated age  Head: Normocephalic, atraumatic, nontender  Eyes: PERRL, EOM-I  No diplopia  No hyphema  No subconjunctival hemorrhages  Symmetrical lids  ENTAtraumatic external nose and ears  MMM  No stridor  Normal phonation  No drooling  Base of mouth is soft  No mastoid tenderness  Neck: Symmetric, trachea midline  No JVD  CV: Peripheral pulses +2 throughout  No chest wall tenderness  Lungs:   Unlabored   No retractions  No crepitus  No tachypnea  No paradoxical motion  Abd: +BS, soft, NT/ND    MSK:   FROM   Back:   No CVAT  Skin: Dry, intact  Neuro: AAOx3, GCS 15, CN II-XII grossly intact  Motor grossly intact  Psychiatric/Behavioral: Appropriate mood and affect   Exam: deferred  A:  - MSK  P:  - Cardiac workup add lipase  - social disposition likely  - 13 point ROS was performed and all are normal unless stated in the history above  - Nursing note reviewed  Vitals reviewed  - Orders placed by myself and/or advanced practitioner / resident     - Previous chart was reviewed  - No language barrier    - History obtained from patient  - There are no limitations to the history obtained  - Critical care time: Not applicable for this patient       Code Status: Prior  Advance Directive and Living Will:      Power of :    POLST:      Medications   sodium chloride (PF) 0 9 % injection 3 mL (has no administration in time range)     No orders to display     Orders Placed This Encounter   Procedures    CBC and differential    Basic metabolic panel    HS Troponin 0hr (reflex protocol)    Lipase    HS Troponin I 2hr    HS Troponin I 4hr    Continuous cardiac monitoring    Continuous pulse oximetry    Insert peripheral IV    ECG 12 lead    ECG 12 lead repeat Q30 minutes PRN persistent chest pain x 2    ECG 12 lead repeat in 2hrs    ECG 12 lead repeat in 4hrs    ECG 12 lead    Place in Observation     Labs Reviewed   CBC AND DIFFERENTIAL - Abnormal       Result Value Ref Range Status    WBC 9 37  4 31 - 10 16 Thousand/uL Final    RBC 4 27  3 88 - 5 62 Million/uL Final    Hemoglobin 11 4 (*) 12 0 - 17 0 g/dL Final    Hematocrit 36 8  36 5 - 49 3 % Final    MCV 86  82 - 98 fL Final    MCH 26 7 (*) 26 8 - 34 3 pg Final    MCHC 31 0 (*) 31 4 - 37 4 g/dL Final    RDW 16 9 (*) 11 6 - 15 1 % Final    MPV 11 0  8 9 - 12 7 fL Final    Platelets 900 (*) 300 - 390 Thousands/uL Final    nRBC 0  /100 WBCs Final    Neutrophils Relative 81 (*) 43 - 75 % Final    Immat GRANS % 1  0 - 2 % Final    Lymphocytes Relative 8 (*) 14 - 44 % Final    Monocytes Relative 9  4 - 12 % Final    Eosinophils Relative 1  0 - 6 % Final    Basophils Relative 0  0 - 1 % Final    Neutrophils Absolute 7 66 (*) 1 85 - 7 62 Thousands/µL Final    Immature Grans Absolute 0 06  0 00 - 0 20 Thousand/uL Final    Lymphocytes Absolute 0 72  0 60 - 4 47 Thousands/µL Final    Monocytes Absolute 0 85  0 17 - 1 22 Thousand/µL Final    Eosinophils Absolute 0 06  0 00 - 0 61 Thousand/µL Final    Basophils Absolute 0 02  0 00 - 0 10 Thousands/µL Final   BASIC METABOLIC PANEL - Abnormal    Sodium 136  135 - 147 mmol/L Final    Potassium 4 2  3 5 - 5 3 mmol/L Final    Chloride 104  96 - 108 mmol/L Final    CO2 31  21 - 32 mmol/L Final    ANION GAP 1 (*) 4 - 13 mmol/L Final    BUN 23  5 - 25 mg/dL Final    Creatinine 1 67 (*) 0 60 - 1 30 mg/dL Final    Comment: Standardized to IDMS reference method    Glucose 355 (*) 65 - 140 mg/dL Final    Comment: If the patient is fasting, the ADA then defines impaired fasting glucose as > 100 mg/dL and diabetes as > or equal to 123 mg/dL  Specimen collection should occur prior to Sulfasalazine administration due to the potential for falsely depressed results  Specimen collection should occur prior to Sulfapyridine administration due to the potential for falsely elevated results  Calcium 8 1 (*) 8 3 - 10 1 mg/dL Final    eGFR 39  ml/min/1 73sq m Final    Narrative:     [place] guidelines for Chronic Kidney Disease (CKD):     Stage 1 with normal or high GFR (GFR > 90 mL/min/1 73 square meters)    Stage 2 Mild CKD (GFR = 60-89 mL/min/1 73 square meters)    Stage 3A Moderate CKD (GFR = 45-59 mL/min/1 73 square meters)    Stage 3B Moderate CKD (GFR = 30-44 mL/min/1 73 square meters)    Stage 4 Severe CKD (GFR = 15-29 mL/min/1 73 square meters)    Stage 5 End Stage CKD (GFR <15 mL/min/1 73 square meters)  Note: GFR calculation is accurate only with a steady state creatinine   HS TROPONIN I 0HR - Normal    hs TnI 0hr 14  "Refer to ACS Flowchart"- see link ng/L Final    Comment:                                              Initial (time 0) result  If >=50 ng/L, Myocardial injury suggested ;  Type of myocardial injury and treatment strategy  to be determined  If 5-49 ng/L, a delta result at 2 hours and or 4 hours will be needed to further evaluate  If <4 ng/L, and chest pain has been >3 hours since onset, patient may qualify for discharge based on the HEART score in the ED  If <5 ng/L and <3hours since onset of chest pain, a delta result at 2 hours will be needed to further evaluate  HS Troponin 99th Percentile URL of a Health Population=12 ng/L with a 95% Confidence Interval of 8-18 ng/L      Second Troponin (time 2 hours)  If calculated delta >= 20 ng/L,  Myocardial injury suggested ; Type of myocardial injury and treatment strategy to be determined  If 5-49 ng/L and the calculated delta is 5-19 ng/L, consult medical service for evaluation  Continue evaluation for ischemia on ecg and other possible etiology and repeat hs troponin at 4 hours  If delta is <5 ng/L at 2 hours, consider discharge based on risk stratification via the HEART score (if in ED), or MIKKI risk score in IP/Observation  HS Troponin 99th Percentile URL of a Health Population=12 ng/L with a 95% Confidence Interval of 8-18 ng/L  LIPASE - Normal    Lipase 168  73 - 393 u/L Final   HS TROPONIN I 2HR     Time reflects when diagnosis was documented in both MDM as applicable and the Disposition within this note     Time User Action Codes Description Comment    8/21/2022 10:26 PM Severo Ramp Add [R07 9] Chest pain     8/21/2022 10:42 PM Severo Ramp Remove [R07 9] Chest pain     8/21/2022 10:42 PM Severo Ramp Add [R07 89] Musculoskeletal chest pain     8/21/2022 10:42 PM Severo Ramp Add [E11 9] Diabetes [facility])       ED Disposition     ED Disposition   Admit    Condition   Stable    Date/Time   Sun Aug 21, 2022 10:41 PM    Comment   Case was discussed with Audrey Chang and the patient's admission status was agreed to be Admission Status: observation status to the service of Dr Audrey Chang              Follow-up Information     Follow up With Specialties Details Why Contact Info Additional Vinod Dunn MD Family Medicine   [address]  [address] [place] [address] Emergency Department Emergency Medicine  As needed [address] [id number]   [facility] Emergency Department, [address], [address]    [address] [facility] Cardiology   [address] [facility] [address], [place], [place], [address] [phone]        Current Discharge Medication List      CONTINUE these medications which have NOT CHANGED    Details   aspirin (ECOTRIN LOW STRENGTH) 81 mg EC tablet Take 1 tablet (81 mg total) by mouth daily  Qty: 30 tablet, Refills: 2    Associated Diagnoses: Essential hypertension; Coronary artery disease involving native coronary artery of native heart without angina pectoris      BD Insulin Syringe U-500 31G X 6MM 0 5 ML MISC USE 1 SYRINGE THREE TIMES A DAY FOR INJECTING INSULIN  Qty: 100 each, Refills: 5    Associated Diagnoses: Type 2 diabetes mellitus with hyperglycemia, with long-term current use of insulin ([facility])      carvedilol (COREG) 6 25 mg tablet TAKE 1 TABLET BY MOUTH TWO TIMES A DAY WITH MEALS  Qty: 180 tablet, Refills: 0    Associated Diagnoses: Essential hypertension; Coronary artery disease involving native coronary artery of native heart without angina pectoris      cyanocobalamin 1,000 mcg/mL Inject 1 mL (1,000 mcg total) into a muscle every 30 (thirty) days  Qty: 1 mL, Refills: 0    Associated Diagnoses: B12 deficiency      cyclobenzaprine (FLEXERIL) 10 mg tablet Take 1 tablet (10 mg total) by mouth in the morning and 1 tablet (10 mg total) before bedtime  Qty: 60 tablet, Refills: 5    Associated Diagnoses: Chronic bilateral low back pain, unspecified whether sciatica present      ferrous sulfate 325 (65 Fe) mg tablet Take 1 tablet (325 mg total) by mouth daily with breakfast  Qty: 30 tablet, Refills: 5    Associated Diagnoses: Stage 3a chronic kidney disease ([facility])      fluticasone-umeclidinium-vilanterol (Trelegy Ellipta) 100-62 5-25 MCG/INH inhaler Inhale 1 puff  in the morning  Rinse mouth after use  Isabel Radar: 1 each, Refills: 5    Associated Diagnoses: COPD exacerbation ([facility])      furosemide (LASIX) 40 mg tablet TAKE 1 TABLET BY MOUTH DAILY  Qty: 90 tablet, Refills: 0    Associated Diagnoses: Acute on chronic diastolic (congestive) heart failure (HCC)      gabapentin (NEURONTIN) 100 mg capsule Take 1 capsule (100 mg total) by mouth 3 (three) times a day  Qty: 90 capsule, Refills: 0    Associated Diagnoses: Chronic bilateral low back pain without sciatica      insulin lispro protamine-insulin lispro (HumaLOG Mix 75/25) 100 units/mL Inject 45 Units under the skin 3 (three) times a day  Qty: 40 mL, Refills: 0    Associated Diagnoses: Type 2 diabetes mellitus with hypoglycemia without coma, with long-term current use of insulin ([facility])      ipratropium-albuterol (DUO-NEB) 0 5-2 5 mg/3 mL nebulizer solution Take 3 mL by nebulization 3 (three) times a day  Qty: 270 mL, Refills: 0    Associated Diagnoses: Chronic respiratory failure with hypoxia ([facility])      lisinopril (ZESTRIL) 10 mg tablet TAKE 1 TABLET BY MOUTH DAILY  Qty: 90 tablet, Refills: 0    Associated Diagnoses: Essential hypertension      oxyCODONE 7 5 MG TABS Take 7 5 mg by mouth 2 (two) times a day as needed for severe pain Max Daily Amount: 15 mg  Qty: 10 tablet, Refills: 0    Associated Diagnoses: Acute cystitis without hematuria      potassium chloride (K-DUR,KLOR-CON) 20 mEq tablet Take 1 tablet (20 mEq total) by mouth in the morning  Qty: 17 tablet, Refills: 0    Comments: Take this pill when you take your lasix  Associated Diagnoses: Chronic respiratory failure with hypoxia and hypercapnia ([facility])      simvastatin (ZOCOR) 40 mg tablet Take 1 tablet (40 mg total) by mouth in the morning  Qty: 30 tablet, Refills: 5    Associated Diagnoses: Hypercholesterolemia      tamsulosin (FLOMAX) 0 4 mg Take 1 capsule (0 4 mg total) by mouth daily with dinner  Qty: 30 capsule, Refills: 0    Associated Diagnoses: Acute cystitis without hematuria           No discharge procedures on file  Prior to Admission Medications   Prescriptions Last Dose Informant Patient Reported? Taking?    BD Insulin Syringe U-500 31G X 6MM 0 5 ML MISC   No No   Sig: USE 1 SYRINGE THREE TIMES A DAY FOR INJECTING INSULIN   aspirin (ECOTRIN LOW STRENGTH) 81 mg EC tablet   No No   Sig: Take 1 tablet (81 mg total) by mouth daily   carvedilol (COREG) 6 25 mg tablet   No No   Sig: TAKE 1 TABLET BY MOUTH TWO TIMES A DAY WITH MEALS   cyanocobalamin 1,000 mcg/mL   No No   Sig: Inject 1 mL (1,000 mcg total) into a muscle every 30 (thirty) days   cyclobenzaprine (FLEXERIL) 10 mg tablet   No No   Sig: Take 1 tablet (10 mg total) by mouth in the morning and 1 tablet (10 mg total) before bedtime  ferrous sulfate 325 (65 Fe) mg tablet   No No   Sig: Take 1 tablet (325 mg total) by mouth daily with breakfast   fluticasone-umeclidinium-vilanterol (Trelegy Ellipta) 100-62 5-25 MCG/INH inhaler   No No   Sig: Inhale 1 puff  in the morning  Rinse mouth after use      furosemide (LASIX) 40 mg tablet   No No   Sig: TAKE 1 TABLET BY MOUTH DAILY   gabapentin (NEURONTIN) 100 mg capsule   No No   Sig: Take 1 capsule (100 mg total) by mouth 3 (three) times a day   insulin lispro protamine-insulin lispro (HumaLOG Mix 75/25) 100 units/mL   No No   Sig: Inject 45 Units under the skin 3 (three) times a day   ipratropium-albuterol (DUO-NEB) 0 5-2 5 mg/3 mL nebulizer solution   No No   Sig: Take 3 mL by nebulization 3 (three) times a day   lisinopril (ZESTRIL) 10 mg tablet   No No   Sig: TAKE 1 TABLET BY MOUTH DAILY   oxyCODONE 7 5 MG TABS   No No   Sig: Take 7 5 mg by mouth 2 (two) times a day as needed for severe pain Max Daily Amount: 15 mg   Patient not taking: Reported on 8/16/2022   potassium chloride (K-DUR,KLOR-CON) 20 mEq tablet   No No   Sig: Take 1 tablet (20 mEq total) by mouth in the morning  simvastatin (ZOCOR) 40 mg tablet   No No   Sig: Take 1 tablet (40 mg total) by mouth in the morning  tamsulosin (FLOMAX) 0 4 mg   No No   Sig: Take 1 capsule (0 4 mg total) by mouth daily with dinner      Facility-Administered Medications: None       Portions of the record may have been created with voice recognition software   Occasional wrong word or "sound a like" substitutions may have occurred due to the inherent limitations of voice recognition software  Read the chart carefully and recognize, using context, where substitutions have occurred      Electronically signed by:  Henna Tavares

## 2022-08-22 NOTE — ASSESSMENT & PLAN NOTE
Patient without shortness of breath or distress  Chest pain is musculoskeletal   Continue Brio and Incruse  Patient has nasal cannula oxygen at night  Uses oxygen as well on a p r n  basis

## 2022-08-22 NOTE — ASSESSMENT & PLAN NOTE
· Continue Coreg 6 25 twice daily, ASA 81 daily  · Troponin negative  May 2022:  Stress ECG: The ECG was not diagnostic due to pharmacological (vasodilator) stress    Perfusion: There is a left ventricular perfusion defect that is medium in size present in the basal to mid inferior location(s) that is paradoxical     Stress Function: Left ventricular function post-stress is normal  Post-stress ejection fraction is 61 %

## 2022-08-22 NOTE — ASSESSMENT & PLAN NOTE
Wt Readings from Last 3 Encounters:   08/21/22 99 8 kg (220 lb)   08/16/22 100 kg (220 lb 7 4 oz)   07/05/22 99 8 kg (220 lb)     Patient has been drinking more fluids lately; BNP elevated but does not demonstrate shortness of breath is able to lie flat on bed with out shortness of breath  Lasix 40 mg intravenous x1  Input and output  Daily-weights

## 2022-08-22 NOTE — ED NOTES
Pt pulled off ECG leads at this time  RN attempted to reapply  Pt expressed desire to leave  Admitting physician made aware        David Griffiths RN  08/22/22 3529

## 2022-08-22 NOTE — ASSESSMENT & PLAN NOTE
Pleural effusion on right is currently stable  Noted elevated BNP compared to previous however patient is not in distress  Continue Brio and Incruse  Will administer Lasix 40 mg intravenous x1  Input and output with daily weights  Continue Lasix 40 mg daily by mouth as per outpatient medication

## 2022-08-22 NOTE — ASSESSMENT & PLAN NOTE
Currently on inhalers  May CT this year showed the following findings: LUNGS:  Chronic right lung volume loss with linear scarring in the right upper lobe and lingula is again seen  Unchanged right basilar round atelectasis  Scattered airspace opacities within the left upper lobe are seen  Unchanged pulmonary emphysema is seen  Impression as follows:Airspace opacities within the left upper lobe which may represent  Recommend short-term follow-up chest CT scan in 3 months to evaluate for resolution  Would order CT of the chest without contrast for follow-up

## 2022-08-22 NOTE — ASSESSMENT & PLAN NOTE
· Assessment of cardiac function and perfusion 05/22 was normal  · Current chest pain is reproducible and troponins have been normal  · Meanwhile will continue with oxycodone 7 5 mg twice daily p r n  for severe pain - acute pain was to see the pt for further recommendations however the pt left AMA

## 2022-08-22 NOTE — DISCHARGE SUMMARY
[facility]  Discharge- Jaja Every Sr  1945, 78 y o  male MRN: [id number]  Unit/Bed#: ED 14 Encounter: [id number]  Primary Care Provider: Starr Li MD   Date and time admitted to hospital: 8/21/2022  8:40 PM    * Medication management  Assessment & Plan  · Patient has problems with procuring medications due to limited availability of resources and is dependent on social security  · He lives with his son who also has difficulty taking care of his needs  · He gets his meals via meals on wheels and he is unsure regarding dietary compliance  · Patient with concerns for mobility and self-care; will get case management with physical therapy and occupational therapy involved  Notified that pt was unhappy with having to wait in the ED before getting a bed upstairs, stating he was not being taken care of, he was "just thrown in a room and forgotten"  I tried to explain the situation to the pt to which he replied with more cursing  I told him I would like him to sign an AMA form, he vehemently refused  After nursing staff tried to contact multiple family members, a Lyft was called, pt walked himself out of the ED and was taken home  Chest pain, musculoskeletal  Assessment & Plan  · Assessment of cardiac function and perfusion 05/22 was normal  · Current chest pain is reproducible and troponins have been normal  · Meanwhile will continue with oxycodone 7 5 mg twice daily p r n  for severe pain - acute pain was to see the pt for further recommendations however the pt left AMA    CAD (coronary artery disease)  Assessment & Plan  · Continue Coreg 6 25 twice daily, ASA 81 daily  · Troponin negative  May 2022:  Stress ECG: The ECG was not diagnostic due to pharmacological (vasodilator) stress      Perfusion: There is a left ventricular perfusion defect that is medium in size present in the basal to mid inferior location(s) that is paradoxical     Stress Function: Left ventricular function post-stress is normal  Post-stress ejection fraction is 61 %            Chronic diastolic heart failure (HCC)  Assessment & Plan  Wt Readings from Last 3 Encounters:   08/21/22 99 8 kg (220 lb)   08/16/22 100 kg (220 lb 7 4 oz)   07/05/22 99 8 kg (220 lb)     · Patient has been drinking more fluids lately; BNP elevated but does not demonstrate shortness of breath is able to lie flat on bed with out shortness of breath  · Lasix 40 mg intravenous x1  Essential hypertension  Assessment & Plan  · Coreg 6 25 twice daily  · Lasix 40 mg daily  · Zestril 10 mg daily  Medical Problems             Resolved Problems  Date Reviewed: 8/22/2022   None                 Admission Date:   Admission Orders (From admission, onward)     Ordered        08/21/22 2241  Place in Observation  Once                        Admitting Diagnosis: Chest pain [R07 9]    HPI: 77 yo male presented originally to [facility] ED with constant aching CP since this morning while watching TV  He states he took 3 motrin without relief  He was discharged home after negative troponins and no recurring CP  While he was waiting for his ride home, the CP returned and the pt states he vomited  Pain was worse with palpation  Of note, pt stated he has run out of diabetes supplies at home and has not been checking his sugars before giving himself insulin  Due to negative workup, CP is likely MSK however pt was admitted for a social/medical management issue and was to have case management and PT/OT see him while he was admitted  Pt became upset about having to stay in the ED as he was being held there due to no bed availability upstairs  Pt was adamant about going home as well as refusing to sign the AMA form  He stated that he was not being taken care of and that no one was doing anything for him so he wanted to go home  A lyft was called from the ED and he walked himself to the waiting room       Procedures Performed: Orders Placed This Encounter   Procedures    ED ECG Documentation Only       Summary of Hospital Course: As noted above    Significant Findings, Care, Treatment and Services Provided: none    Complications: none    Condition at Discharge: stable         Discharge instructions/Information to patient and family:   See after visit summary for information provided to patient and family  Provisions for Follow-Up Care:  See after visit summary for information related to follow-up care and any pertinent home health orders  PCP: Socrates Melgar MD    Disposition: Left against medical advice    Planned Readmission: No    Discharge Statement   I spent 40 minutes discharging the patient  This time was spent on the day of discharge  I had direct contact with the patient on the day of discharge  Additional documentation is required if more than 30 minutes were spent on discharge  Discharge Medications:  See after visit summary for reconciled discharge medications provided to patient and family

## 2022-08-22 NOTE — ASSESSMENT & PLAN NOTE
Also June, was discharged with oxycodone 7 5 mg twice daily p r n  for severe pain  This is also in conjunction with complaints of chest pain which is also musculoskeletal   It is reproducible  As per patient, was advised that he needs to see pain management  While in hospital, will continue with oxycodone 7 5 mg twice daily p r n  for severe pain  Acetaminophen 650 mg Q 6 p r n  for mild pain  Will also get Pain Management involved to give us an opinion regarding out patient treatment of pain  Physical and occupational therapy with case management consult

## 2022-08-23 ENCOUNTER — TELEPHONE (OUTPATIENT)
Dept: FAMILY MEDICINE CLINIC | Facility: CLINIC | Age: 77
End: 2022-08-23

## 2022-08-23 DIAGNOSIS — Z79.4 TYPE 2 DIABETES MELLITUS WITH HYPOGLYCEMIA WITHOUT COMA, WITH LONG-TERM CURRENT USE OF INSULIN (HCC): Primary | ICD-10-CM

## 2022-08-23 DIAGNOSIS — E11.649 TYPE 2 DIABETES MELLITUS WITH HYPOGLYCEMIA WITHOUT COMA, WITH LONG-TERM CURRENT USE OF INSULIN (HCC): Primary | ICD-10-CM

## 2022-08-23 NOTE — TELEPHONE ENCOUNTER
Patient needs script for:    One Touch Ultra test strips # 50     [facility]  - needs for [facility] to deliver since he doesn't have transportation

## 2022-08-24 ENCOUNTER — TRANSITIONAL CARE MANAGEMENT (OUTPATIENT)
Dept: FAMILY MEDICINE CLINIC | Facility: CLINIC | Age: 77
End: 2022-08-24

## 2022-08-25 ENCOUNTER — OFFICE VISIT (OUTPATIENT)
Dept: FAMILY MEDICINE CLINIC | Facility: CLINIC | Age: 77
End: 2022-08-25
Payer: COMMERCIAL

## 2022-08-25 VITALS
BODY MASS INDEX: 30.88 KG/M2 | WEIGHT: 228 LBS | HEIGHT: 72 IN | SYSTOLIC BLOOD PRESSURE: 168 MMHG | TEMPERATURE: 97.5 F | HEART RATE: 72 BPM | DIASTOLIC BLOOD PRESSURE: 70 MMHG | OXYGEN SATURATION: 92 %

## 2022-08-25 DIAGNOSIS — N30.00 ACUTE CYSTITIS WITHOUT HEMATURIA: ICD-10-CM

## 2022-08-25 DIAGNOSIS — E78.00 HYPERCHOLESTEROLEMIA: ICD-10-CM

## 2022-08-25 DIAGNOSIS — Z79.4 TYPE 2 DIABETES MELLITUS WITH HYPOGLYCEMIA WITHOUT COMA, WITH LONG-TERM CURRENT USE OF INSULIN (HCC): ICD-10-CM

## 2022-08-25 DIAGNOSIS — M54.50 CHRONIC BILATERAL LOW BACK PAIN WITHOUT SCIATICA: ICD-10-CM

## 2022-08-25 DIAGNOSIS — Z76.89 ENCOUNTER FOR SUPPORT AND COORDINATION OF TRANSITION OF CARE: ICD-10-CM

## 2022-08-25 DIAGNOSIS — J96.11 CHRONIC RESPIRATORY FAILURE WITH HYPOXIA AND HYPERCAPNIA (HCC): Chronic | ICD-10-CM

## 2022-08-25 DIAGNOSIS — I50.33 ACUTE ON CHRONIC DIASTOLIC (CONGESTIVE) HEART FAILURE (HCC): ICD-10-CM

## 2022-08-25 DIAGNOSIS — I10 ESSENTIAL HYPERTENSION: ICD-10-CM

## 2022-08-25 DIAGNOSIS — J96.12 CHRONIC RESPIRATORY FAILURE WITH HYPOXIA AND HYPERCAPNIA (HCC): Chronic | ICD-10-CM

## 2022-08-25 DIAGNOSIS — J44.1 COPD EXACERBATION (HCC): ICD-10-CM

## 2022-08-25 DIAGNOSIS — G89.29 CHRONIC BILATERAL LOW BACK PAIN WITHOUT SCIATICA: ICD-10-CM

## 2022-08-25 DIAGNOSIS — N18.31 STAGE 3A CHRONIC KIDNEY DISEASE (HCC): ICD-10-CM

## 2022-08-25 DIAGNOSIS — I25.10 CORONARY ARTERY DISEASE INVOLVING NATIVE CORONARY ARTERY OF NATIVE HEART WITHOUT ANGINA PECTORIS: ICD-10-CM

## 2022-08-25 DIAGNOSIS — Z65.8 INSUFFICIENT SOCIAL SUPPORT: Primary | ICD-10-CM

## 2022-08-25 DIAGNOSIS — Z23 ENCOUNTER FOR IMMUNIZATION: ICD-10-CM

## 2022-08-25 DIAGNOSIS — E11.649 TYPE 2 DIABETES MELLITUS WITH HYPOGLYCEMIA WITHOUT COMA, WITH LONG-TERM CURRENT USE OF INSULIN (HCC): ICD-10-CM

## 2022-08-25 PROCEDURE — 91305 PR SARSCOV2 VACCINE 30MCG/0.3ML TRIS-SUCROSE IM USE: CPT

## 2022-08-25 PROCEDURE — 99495 TRANSJ CARE MGMT MOD F2F 14D: CPT

## 2022-08-25 PROCEDURE — 0054A PR IMM ADMN SARSCOV2 30MCG/0.3ML TRIS-SUCROSE BST: CPT

## 2022-08-25 RX ORDER — CARVEDILOL 6.25 MG/1
6.25 TABLET ORAL 2 TIMES DAILY WITH MEALS
Qty: 180 TABLET | Refills: 0 | Status: ON HOLD | OUTPATIENT
Start: 2022-08-25 | End: 2022-09-01 | Stop reason: SDUPTHER

## 2022-08-25 RX ORDER — GABAPENTIN 100 MG/1
100 CAPSULE ORAL 3 TIMES DAILY
Qty: 90 CAPSULE | Refills: 0 | Status: ON HOLD | OUTPATIENT
Start: 2022-08-25 | End: 2022-09-01 | Stop reason: SDUPTHER

## 2022-08-25 RX ORDER — POTASSIUM CHLORIDE 20 MEQ/1
20 TABLET, EXTENDED RELEASE ORAL DAILY
Qty: 17 TABLET | Refills: 0 | Status: ON HOLD | OUTPATIENT
Start: 2022-08-25 | End: 2022-09-02 | Stop reason: SDUPTHER

## 2022-08-25 RX ORDER — INSULIN LISPRO PROTAMIN/LISPRO 75-25/ML
45 VIAL (ML) SUBCUTANEOUS 3 TIMES DAILY
Qty: 40 ML | Refills: 0 | Status: ON HOLD | OUTPATIENT
Start: 2022-08-25 | End: 2022-09-01 | Stop reason: SDUPTHER

## 2022-08-25 RX ORDER — FERROUS SULFATE 325(65) MG
325 TABLET ORAL
Qty: 30 TABLET | Refills: 5 | Status: ON HOLD | OUTPATIENT
Start: 2022-08-25 | End: 2022-09-01 | Stop reason: SDUPTHER

## 2022-08-25 RX ORDER — SIMVASTATIN 40 MG
40 TABLET ORAL DAILY
Qty: 30 TABLET | Refills: 5 | Status: ON HOLD | OUTPATIENT
Start: 2022-08-25 | End: 2022-09-01 | Stop reason: SDUPTHER

## 2022-08-25 RX ORDER — TAMSULOSIN HYDROCHLORIDE 0.4 MG/1
0.4 CAPSULE ORAL
Qty: 30 CAPSULE | Refills: 0 | Status: ON HOLD | OUTPATIENT
Start: 2022-08-25 | End: 2022-09-01 | Stop reason: SDUPTHER

## 2022-08-25 RX ORDER — ASPIRIN 81 MG/1
81 TABLET ORAL DAILY
Qty: 30 TABLET | Refills: 2 | Status: ON HOLD | OUTPATIENT
Start: 2022-08-25 | End: 2022-09-01 | Stop reason: SDUPTHER

## 2022-08-25 RX ORDER — LOSARTAN POTASSIUM 50 MG/1
50 TABLET ORAL DAILY
Qty: 90 TABLET | Refills: 1 | Status: ON HOLD | OUTPATIENT
Start: 2022-08-25 | End: 2022-09-02 | Stop reason: SDUPTHER

## 2022-08-25 RX ORDER — CYCLOBENZAPRINE HCL 10 MG
10 TABLET ORAL 2 TIMES DAILY
Qty: 60 TABLET | Refills: 5 | Status: ON HOLD | COMMUNITY
Start: 2022-08-25 | End: 2022-09-01 | Stop reason: SDUPTHER

## 2022-08-25 RX ORDER — FUROSEMIDE 40 MG/1
40 TABLET ORAL DAILY
Qty: 90 TABLET | Refills: 0 | Status: ON HOLD | OUTPATIENT
Start: 2022-08-25 | End: 2022-09-02 | Stop reason: SDUPTHER

## 2022-08-25 NOTE — PROGRESS NOTES
Assessment/Plan:    Chronic diastolic heart failure (HCC)  Wt Readings from Last 3 Encounters:   08/25/22 103 kg (228 lb)   08/21/22 99 8 kg (220 lb)   08/16/22 100 kg (220 lb 7 4 oz)     Educated on monitoring fluid intake  Denies SOB continue lasix as prescribed and cardiac diet  Essential hypertension  Coreg 6 25 mg BID, lasix 40mg  States lisinopril causes confusion and sleepiness, switched to losartan 50mg  And needs to monitor BP and f/u  Type 2 diabetes mellitus with hypoglycemia without coma, with long-term current use of insulin ([facility])    Lab Results   Component Value Date    HGBA1C 10 3 (H) 07/06/2022   He reports he is taking humalog 45 units at breakfast and lunch and 35 units at night time  HLD (hyperlipidemia)  Continue zocor as prescribed  He has not been taking medication    Oxygen dependent  Continue 3L O2 as needed       Diagnoses and all orders for this visit:    Insufficient social support  -     Cancel: Ambulatory Referral to Social Work Care Management Program; Future  -     Ambulatory Referral to Social Work Care Management Program; Future    Acute cystitis without hematuria  -     tamsulosin (FLOMAX) 0 4 mg; Take 1 capsule (0 4 mg total) by mouth daily with dinner    Hypercholesterolemia  -     simvastatin (ZOCOR) 40 mg tablet; Take 1 tablet (40 mg total) by mouth daily    Chronic respiratory failure with hypoxia and hypercapnia ([facility])  -     potassium chloride (K-DUR,KLOR-CON) 20 mEq tablet; Take 1 tablet (20 mEq total) by mouth daily    Type 2 diabetes mellitus with hypoglycemia without coma, with long-term current use of insulin ([facility])  -     insulin lispro protamine-insulin lispro (HumaLOG Mix 75/25) 100 units/mL; Inject 45 Units under the skin 3 (three) times a day    Acute on chronic diastolic (congestive) heart failure  -     furosemide (LASIX) 40 mg tablet; Take 1 tablet (40 mg total) by mouth daily    Essential hypertension  -     carvedilol (COREG) 6 25 mg tablet;  Take 1 tablet (6 25 mg total) by mouth 2 (two) times a day with meals  -     aspirin (ECOTRIN LOW STRENGTH) 81 mg EC tablet; Take 1 tablet (81 mg total) by mouth daily  -     losartan (COZAAR) 50 mg tablet; Take 1 tablet (50 mg total) by mouth daily    Coronary artery disease involving native coronary artery of native heart without angina pectoris  -     carvedilol (COREG) 6 25 mg tablet; Take 1 tablet (6 25 mg total) by mouth 2 (two) times a day with meals  -     aspirin (ECOTRIN LOW STRENGTH) 81 mg EC tablet; Take 1 tablet (81 mg total) by mouth daily    COPD exacerbation (HCC)  -     fluticasone-umeclidinium-vilanterol (Trelegy Ellipta) 100-62 5-25 MCG/INH inhaler; Inhale 1 puff daily Rinse mouth after use  Stage 3a chronic kidney disease (HCC)  -     ferrous sulfate 325 (65 Fe) mg tablet; Take 1 tablet (325 mg total) by mouth daily with breakfast    Chronic bilateral low back pain without sciatica  -     gabapentin (NEURONTIN) 100 mg capsule; Take 1 capsule (100 mg total) by mouth 3 (three) times a day    Encounter for support and coordination of transition of care    Encounter for immunization  -     COVID-19 Pfizer vacc roger-sucrose gray cap formulation    Other orders  -     cyclobenzaprine (FLEXERIL) 10 mg tablet; Take 10 mg by mouth 2 (two) times a day          Subjective:      Patient ID: Shira Manuel is a 78 y o  male  TCM Call     Date and time call was made  6/21/2022 10:50 AM    Hospital care reviewed  Records reviewed    Patient was hospitial[facility] at  [facility]    Date of Admission  06/15/22    Date of discharge  06/19/22    Diagnosis  Acute cystitis without hematuria    Disposition  Home    Were the patients medications reviewed and updated  Yes    Current Symptoms  None    Weakness severity  Mild      TCM Call     Post hospital issues  None    Should patient be enrolled in anticoag monitoring? No    Scheduled for follow up?   Yes    Did you obtain your prescribed medications  Yes Do you need help managing your prescriptions or medications  No    Is transportation to your appointment needed  No    I have advised the patient to call PCP with any new or worsening symptoms    Carlos Fuller 35  Family    The type of support provided  Emotional    Do you have social support  Yes, as much as I need    Are you recieving any outpatient services  No    Are you recieving home care services  Yes    Types of home care services  Home health aid    Are you using any community resources  No    Current waiver services  No    Have you fallen in the last 12 months  No    Interperter language line needed  No      Judith Smith was at the ER 8/21 for chest pain  Labs and cardiac work-up done, cardiac perfusion and function were normal, however he left without further treatment  He states his chest pain has since subsided  He states all his medications were discontinued and shows as such in the system  He is using 3L of oxygen as needed and states he does not have any money until 9/14 to get his medications  Referral sent to care management and patient made aware he needs to speak with them when they call so we can try to assist him in obtaining medications since he had refused [facility] in the past  He provided number for his son since he does not have a phone and information relayed in referral     Acute on chronic CHF -  He is prescribed lasix 40mg  Needs medication refilled  Has b/l lower extremity edema on examination that he states has been improving since he was at the hospital  He has been drinking a lot of water advised to monitor fluid intake, elevate legs and compression stockings  Essential HTN - BP elevated today, he has not been taking any of his medications  States that he stopped lisinopril as it was making him confused and sleepy  He is supposed to be on carvedilol BID but does not have any medication left   Refill sent and switched him to losartan 50mg     Type 2 DM - He is taking Humalog 45 units at breakfast and lunch and 35 units at night  He has medication at home and request for strips were sent to pharmacy for him to  yesterday  He is requesting COVID vaccine at this time and will schedule f/u visit for MAW  Refill sent for the remainder of his medications  The following portions of the patient's history were reviewed and updated as appropriate: allergies, current medications, past family history, past medical history, past social history, past surgical history and problem list     Review of Systems   Constitutional: Negative for appetite change, chills, fatigue and fever  HENT: Negative for congestion, ear discharge, postnasal drip, sneezing and sore throat  Eyes: Negative for visual disturbance  Respiratory: Positive for cough  Negative for chest tightness, shortness of breath and wheezing  Cardiovascular: Positive for leg swelling  Negative for chest pain  Gastrointestinal: Negative for abdominal distention, abdominal pain, diarrhea, nausea and vomiting  Genitourinary: Negative for decreased urine volume and urgency  Musculoskeletal: Positive for back pain and gait problem  Negative for joint swelling  Skin: Negative for color change and rash  Allergic/Immunologic: Negative for environmental allergies  Neurological: Negative for dizziness, seizures, facial asymmetry, weakness, light-headedness, numbness and headaches  Hematological: Negative for adenopathy  Psychiatric/Behavioral: Negative for agitation and suicidal ideas  The patient is not nervous/anxious  Objective:      /70 (BP Location: Right arm, Patient Position: Sitting, Cuff Size: Standard)   Pulse 72   Temp 97 5 °F (36 4 °C) (Temporal)   Ht 6' (1 829 m)   Wt 103 kg (228 lb)   SpO2 92%   BMI 30 92 kg/m²          Physical Exam  Vitals and nursing note reviewed  Constitutional:       General: He is not in acute distress  Appearance: Normal appearance  He is obese  He is not ill-appearing or toxic-appearing  HENT:      Head: Normocephalic and atraumatic  Right Ear: Tympanic membrane normal  There is no impacted cerumen  Left Ear: Tympanic membrane normal  There is no impacted cerumen  Nose: Nose normal       Mouth/Throat:      Mouth: Mucous membranes are moist    Eyes:      Conjunctiva/sclera: Conjunctivae normal       Pupils: Pupils are equal, round, and reactive to light  Cardiovascular:      Rate and Rhythm: Normal rate and regular rhythm  Pulses: Normal pulses  Heart sounds: Normal heart sounds  No murmur heard  Pulmonary:      Effort: Pulmonary effort is normal  No respiratory distress  Breath sounds: Normal breath sounds  No decreased breath sounds  Abdominal:      General: Bowel sounds are normal       Tenderness: There is no right CVA tenderness or left CVA tenderness  Musculoskeletal:         General: No swelling  Normal range of motion  Cervical back: Normal range of motion  No tenderness  Right lower le+ Pitting Edema present  Left lower le+ Pitting Edema present  Skin:     General: Skin is warm  Capillary Refill: Capillary refill takes less than 2 seconds  Neurological:      Mental Status: He is alert and oriented to person, place, and time  Motor: Weakness present  Coordination: Coordination normal       Gait: Gait abnormal       Deep Tendon Reflexes: Reflexes normal    Psychiatric:         Mood and Affect: Mood normal          Behavior: Behavior normal          Thought Content:  Thought content normal          Judgment: Judgment normal

## 2022-08-25 NOTE — ASSESSMENT & PLAN NOTE
Coreg 6 25 mg BID, lasix 40mg  States lisinopril causes confusion and sleepiness, switched to losartan 50mg  And needs to monitor BP and f/u

## 2022-08-25 NOTE — ASSESSMENT & PLAN NOTE
Wt Readings from Last 3 Encounters:   08/25/22 103 kg (228 lb)   08/21/22 99 8 kg (220 lb)   08/16/22 100 kg (220 lb 7 4 oz)     Educated on monitoring fluid intake  Denies SOB continue lasix as prescribed and cardiac diet

## 2022-08-25 NOTE — PATIENT INSTRUCTIONS
Low-Sodium Diet   WHAT YOU NEED TO KNOW:   A low-sodium diet limits foods that are high in sodium (salt)  You will need to follow a low-sodium diet if you have high blood pressure, kidney disease, or heart failure  You may also need to follow this diet if you have a condition that is causing your body to retain (hold) extra fluid  You may need to limit the amount of sodium you eat in a day to 1,500 to 2,000 mg  Ask your healthcare provider how much sodium you can have each day  DISCHARGE INSTRUCTIONS:   How to use food labels to choose foods that are low in sodium:  Read food labels to find the amount of sodium they contain  The amount of sodium is listed in milligrams (mg)  The % Daily Value (DV) column tells you how much of your daily needs are met by 1 serving of the food for each nutrient listed  Choose foods that have less than 5% of the DV of sodium  These foods are considered low in sodium  Foods that have 20% or more of the DV of sodium are considered high in sodium  Some food labels may also list any of the following terms that tell you about the sodium content in the food:  Sodium-free:  Less than 5 mg in each serving    Very low sodium:  35 mg of sodium or less in each serving    Low sodium:  140 mg of sodium or less in each serving    Reduced sodium: At least 25% less sodium in each serving than the regular type    Light in sodium:  50% less sodium in each serving    Unsalted or no added salt:  No extra salt is added during processing (the food may still contain sodium)       Foods to avoid:  Salty foods are high in sodium   You should avoid the following:  Processed foods:      Mixes for cornbread, biscuits, cake, and pudding     Instant foods, such as potatoes, cereals, noodles, and rice     Packaged foods, such as bread stuffing, rice and pasta mixes, snack dip mixes, and macaroni and cheese     Canned foods, such as canned vegetables, soups, broths, sauces, and vegetable or tomato juice    Snack foods, such as salted chips, popcorn, pretzels, pork rinds, salted crackers, and salted nuts    Frozen foods, such as dinners, entrees, vegetables with sauces, and breaded meats    Sauerkraut, pickled vegetables, and other foods prepared in brine    Meats and cheeses:      Smoked or cured meat, such as corned beef, rivero, ham, hot dogs, and sausage    Canned meats or spreads, such as potted meats, sardines, anchovies, and imitation seafood    Deli or lunch meats, such as bologna, ham, turkey, and roast beef    Processed cheese, such as American cheese and cheese spreads    Condiments, sauces, and seasonings:      Salt (¼ teaspoon of salt contains 575 mg of sodium)    Seasonings made with salt, such as garlic salt, celery salt, onion salt, and seasoned salt    Regular soy sauce, barbecue sauce, teriyaki sauce, steak sauce, Worcestershire sauce, and most flavored vinegars    Canned gravy and mixes     Regular condiments, such as mustard, ketchup, and salad dressings    Pickles and olives    Meat tenderizers and monosodium glutamate (MSG)    Foods to include:  Read the food label to find the amount of sodium in each serving  Bread and cereal:  Try to choose breads with less than 80 mg of sodium per serving  Bread, roll, francisca, tortilla, or unsalted crackers  Ready-to-eat cereals with less than 5% DV of sodium (examples include shredded wheat and puffed rice)    Pasta    Vegetables and fruits:      Unsalted fresh, frozen, or canned vegetables    Fresh, frozen, or canned fruits    Fruit juice    Dairy:  One serving has about 150 mg of sodium  Milk, all types    Yogurt    Hard cheese, such as cheddar, Swiss, [employer], or [employer] and other protein foods:  Some raw meats may have added sodium       Plain meats, fish, and poultry     Egg    Other foods:      Homemade pudding    Unsalted nuts, popcorn, or pretzels    Unsalted butter or margarine    Ways to decrease sodium:   Add spices and herbs to foods instead of salt during cooking  Use salt-free seasonings to add flavor to foods  Examples include onion powder, garlic powder, basil, robles powder, paprika, and parsley  Try lemon or lime juice or vinegar to give foods a tart flavor  Use hot peppers, pepper, or cayenne pepper to add a spicy flavor  Do not keep a salt shaker at your kitchen table  This may help keep you from adding salt to food at the table  A teaspoon of salt has 2,300 mg of sodium  It may take time to get used to enjoying the natural flavor of food instead of adding salt  Talk to your healthcare provider before you use salt substitutes  Some salt substitutes have a high amount of potassium and need to be avoided if you have kidney disease  Choose low-sodium foods at restaurants  Meals from restaurants are often high in sodium  Some restaurants have nutrition information on the menu that tells you the amount of sodium in their foods  If possible, ask for your food to be prepared with less, or no salt  Shop for unsalted or low-sodium foods and snacks at the grocery store  Examples include unsalted or low-sodium broths, soups, and canned vegetables  Choose fresh or frozen vegetables instead  Choose unsalted nuts or seeds or fresh fruits or vegetables as snacks  Read food labels and choose salt-free, very low-sodium, or low-sodium foods  © Copyright [facility] 2022 Information is for End User's use only and may not be sold, redistributed or otherwise used for commercial purposes  All illustrations and images included in CareNotes® are the copyrighted property of A D A M , Inc  or Reynold Rizvi   The above information is an  only  It is not intended as medical advice for individual conditions or treatments  Talk to your doctor, nurse or pharmacist before following any medical regimen to see if it is safe and effective for you

## 2022-08-25 NOTE — ASSESSMENT & PLAN NOTE
Lab Results   Component Value Date    HGBA1C 10 3 (H) 07/06/2022   He reports he is taking humalog 45 units at breakfast and lunch and 35 units at night time

## 2022-08-29 ENCOUNTER — HOSPITAL ENCOUNTER (INPATIENT)
Facility: HOSPITAL | Age: 77
LOS: 4 days | Discharge: HOME | DRG: 291 | End: 2022-09-02
Attending: EMERGENCY MEDICINE | Admitting: INTERNAL MEDICINE
Payer: COMMERCIAL

## 2022-08-29 ENCOUNTER — APPOINTMENT (OUTPATIENT)
Dept: RADIOLOGY | Facility: HOSPITAL | Age: 77
DRG: 291 | End: 2022-08-29
Payer: COMMERCIAL

## 2022-08-29 DIAGNOSIS — E11.649 TYPE 2 DIABETES MELLITUS WITH HYPOGLYCEMIA WITHOUT COMA, WITH LONG-TERM CURRENT USE OF INSULIN (HCC): ICD-10-CM

## 2022-08-29 DIAGNOSIS — M54.50 CHRONIC BILATERAL LOW BACK PAIN WITHOUT SCIATICA: ICD-10-CM

## 2022-08-29 DIAGNOSIS — N18.31 STAGE 3A CHRONIC KIDNEY DISEASE (HCC): ICD-10-CM

## 2022-08-29 DIAGNOSIS — E78.00 HYPERCHOLESTEROLEMIA: ICD-10-CM

## 2022-08-29 DIAGNOSIS — G89.29 CHRONIC BILATERAL LOW BACK PAIN WITHOUT SCIATICA: ICD-10-CM

## 2022-08-29 DIAGNOSIS — M62.838 MUSCLE SPASM: ICD-10-CM

## 2022-08-29 DIAGNOSIS — Z79.4 TYPE 2 DIABETES MELLITUS WITH HYPOGLYCEMIA WITHOUT COMA, WITH LONG-TERM CURRENT USE OF INSULIN (HCC): ICD-10-CM

## 2022-08-29 DIAGNOSIS — J96.11 CHRONIC RESPIRATORY FAILURE WITH HYPOXIA AND HYPERCAPNIA (HCC): Chronic | ICD-10-CM

## 2022-08-29 DIAGNOSIS — I25.10 CORONARY ARTERY DISEASE INVOLVING NATIVE CORONARY ARTERY OF NATIVE HEART WITHOUT ANGINA PECTORIS: ICD-10-CM

## 2022-08-29 DIAGNOSIS — I10 ESSENTIAL HYPERTENSION: ICD-10-CM

## 2022-08-29 DIAGNOSIS — I50.33 ACUTE ON CHRONIC DIASTOLIC (CONGESTIVE) HEART FAILURE (HCC): ICD-10-CM

## 2022-08-29 DIAGNOSIS — N18.30 STAGE 3 CHRONIC KIDNEY DISEASE, UNSPECIFIED WHETHER STAGE 3A OR 3B CKD (HCC): ICD-10-CM

## 2022-08-29 DIAGNOSIS — J96.12 CHRONIC RESPIRATORY FAILURE WITH HYPOXIA AND HYPERCAPNIA (HCC): Chronic | ICD-10-CM

## 2022-08-29 DIAGNOSIS — I50.9 ACUTE ON CHRONIC CONGESTIVE HEART FAILURE, UNSPECIFIED HEART FAILURE TYPE (HCC): Primary | ICD-10-CM

## 2022-08-29 DIAGNOSIS — N30.00 ACUTE CYSTITIS WITHOUT HEMATURIA: ICD-10-CM

## 2022-08-29 DIAGNOSIS — J44.1 COPD EXACERBATION (HCC): ICD-10-CM

## 2022-08-29 PROBLEM — Z78.9 FREQUENT HOSPITAL ADMISSIONS: Status: ACTIVE | Noted: 2022-08-29

## 2022-08-29 LAB
2HR DELTA HS TROPONIN: 2 NG/L
ALBUMIN SERPL BCP-MCNC: 2.6 G/DL (ref 3.5–5)
ALP SERPL-CCNC: 86 U/L (ref 46–116)
ALT SERPL W P-5'-P-CCNC: 20 U/L (ref 12–78)
ANION GAP SERPL CALCULATED.3IONS-SCNC: 5 MMOL/L (ref 4–13)
AST SERPL W P-5'-P-CCNC: 11 U/L (ref 5–45)
BASOPHILS # BLD AUTO: 0.01 THOUSANDS/ΜL (ref 0–0.1)
BASOPHILS NFR BLD AUTO: 0 % (ref 0–1)
BILIRUB SERPL-MCNC: 0.2 MG/DL (ref 0.2–1)
BUN SERPL-MCNC: 29 MG/DL (ref 5–25)
CALCIUM ALBUM COR SERPL-MCNC: 9.3 MG/DL (ref 8.3–10.1)
CALCIUM SERPL-MCNC: 8.2 MG/DL (ref 8.3–10.1)
CARDIAC TROPONIN I PNL SERPL HS: 16 NG/L
CARDIAC TROPONIN I PNL SERPL HS: 18 NG/L
CHLORIDE SERPL-SCNC: 101 MMOL/L (ref 96–108)
CO2 SERPL-SCNC: 32 MMOL/L (ref 21–32)
CREAT SERPL-MCNC: 1.56 MG/DL (ref 0.6–1.3)
EOSINOPHIL # BLD AUTO: 0.08 THOUSAND/ΜL (ref 0–0.61)
EOSINOPHIL NFR BLD AUTO: 1 % (ref 0–6)
ERYTHROCYTE [DISTWIDTH] IN BLOOD BY AUTOMATED COUNT: 15.9 % (ref 11.6–15.1)
GFR SERPL CREATININE-BSD FRML MDRD: 43 ML/MIN/1.73SQ M
GLUCOSE SERPL-MCNC: 312 MG/DL (ref 65–140)
GLUCOSE SERPL-MCNC: 331 MG/DL (ref 65–140)
HCT VFR BLD AUTO: 37.5 % (ref 36.5–49.3)
HGB BLD-MCNC: 12.1 G/DL (ref 12–17)
IMM GRANULOCYTES # BLD AUTO: 0.06 THOUSAND/UL (ref 0–0.2)
IMM GRANULOCYTES NFR BLD AUTO: 1 % (ref 0–2)
LYMPHOCYTES # BLD AUTO: 0.86 THOUSANDS/ΜL (ref 0.6–4.47)
LYMPHOCYTES NFR BLD AUTO: 11 % (ref 14–44)
MCH RBC QN AUTO: 27.3 PG (ref 26.8–34.3)
MCHC RBC AUTO-ENTMCNC: 32.3 G/DL (ref 31.4–37.4)
MCV RBC AUTO: 85 FL (ref 82–98)
MONOCYTES # BLD AUTO: 0.59 THOUSAND/ΜL (ref 0.17–1.22)
MONOCYTES NFR BLD AUTO: 8 % (ref 4–12)
NEUTROPHILS # BLD AUTO: 6.17 THOUSANDS/ΜL (ref 1.85–7.62)
NEUTS SEG NFR BLD AUTO: 79 % (ref 43–75)
NRBC BLD AUTO-RTO: 0 /100 WBCS
NT-PROBNP SERPL-MCNC: 2510 PG/ML
PLATELET # BLD AUTO: 122 THOUSANDS/UL (ref 149–390)
PMV BLD AUTO: 11.1 FL (ref 8.9–12.7)
POTASSIUM SERPL-SCNC: 4.2 MMOL/L (ref 3.5–5.3)
PROT SERPL-MCNC: 7.1 G/DL (ref 6.4–8.4)
RBC # BLD AUTO: 4.44 MILLION/UL (ref 3.88–5.62)
SODIUM SERPL-SCNC: 138 MMOL/L (ref 135–147)
WBC # BLD AUTO: 7.77 THOUSAND/UL (ref 4.31–10.16)

## 2022-08-29 PROCEDURE — 36415 COLL VENOUS BLD VENIPUNCTURE: CPT

## 2022-08-29 PROCEDURE — 93005 ELECTROCARDIOGRAM TRACING: CPT

## 2022-08-29 PROCEDURE — 99285 EMERGENCY DEPT VISIT HI MDM: CPT | Performed by: EMERGENCY MEDICINE

## 2022-08-29 PROCEDURE — 85025 COMPLETE CBC W/AUTO DIFF WBC: CPT

## 2022-08-29 PROCEDURE — 71045 X-RAY EXAM CHEST 1 VIEW: CPT

## 2022-08-29 PROCEDURE — 82948 REAGENT STRIP/BLOOD GLUCOSE: CPT

## 2022-08-29 PROCEDURE — 80053 COMPREHEN METABOLIC PANEL: CPT

## 2022-08-29 PROCEDURE — 99285 EMERGENCY DEPT VISIT HI MDM: CPT

## 2022-08-29 PROCEDURE — 99223 1ST HOSP IP/OBS HIGH 75: CPT | Performed by: INTERNAL MEDICINE

## 2022-08-29 PROCEDURE — 83880 ASSAY OF NATRIURETIC PEPTIDE: CPT

## 2022-08-29 PROCEDURE — 96374 THER/PROPH/DIAG INJ IV PUSH: CPT

## 2022-08-29 PROCEDURE — 84484 ASSAY OF TROPONIN QUANT: CPT

## 2022-08-29 RX ORDER — HEPARIN SODIUM 5000 [USP'U]/ML
5000 INJECTION, SOLUTION INTRAVENOUS; SUBCUTANEOUS EVERY 8 HOURS SCHEDULED
Status: DISCONTINUED | OUTPATIENT
Start: 2022-08-29 | End: 2022-09-02 | Stop reason: HOSPADM

## 2022-08-29 RX ORDER — INSULIN ASPART 100 [IU]/ML
45 INJECTION, SUSPENSION SUBCUTANEOUS
Status: DISCONTINUED | OUTPATIENT
Start: 2022-08-30 | End: 2022-09-01

## 2022-08-29 RX ORDER — TAMSULOSIN HYDROCHLORIDE 0.4 MG/1
0.4 CAPSULE ORAL
Status: DISCONTINUED | OUTPATIENT
Start: 2022-08-30 | End: 2022-09-02 | Stop reason: HOSPADM

## 2022-08-29 RX ORDER — FERROUS SULFATE 325(65) MG
325 TABLET ORAL
Status: DISCONTINUED | OUTPATIENT
Start: 2022-08-30 | End: 2022-09-01

## 2022-08-29 RX ORDER — INSULIN LISPRO 100 [IU]/ML
4-20 INJECTION, SOLUTION INTRAVENOUS; SUBCUTANEOUS
Status: DISCONTINUED | OUTPATIENT
Start: 2022-08-30 | End: 2022-09-01

## 2022-08-29 RX ORDER — LOSARTAN POTASSIUM 50 MG/1
50 TABLET ORAL DAILY
Status: DISCONTINUED | OUTPATIENT
Start: 2022-08-30 | End: 2022-08-30

## 2022-08-29 RX ORDER — FUROSEMIDE 10 MG/ML
40 INJECTION INTRAMUSCULAR; INTRAVENOUS 2 TIMES DAILY
Status: DISCONTINUED | OUTPATIENT
Start: 2022-08-30 | End: 2022-08-31

## 2022-08-29 RX ORDER — ASPIRIN 81 MG/1
81 TABLET ORAL DAILY
Status: DISCONTINUED | OUTPATIENT
Start: 2022-08-30 | End: 2022-09-02 | Stop reason: HOSPADM

## 2022-08-29 RX ORDER — PRAVASTATIN SODIUM 80 MG/1
80 TABLET ORAL
Status: DISCONTINUED | OUTPATIENT
Start: 2022-08-30 | End: 2022-09-02 | Stop reason: HOSPADM

## 2022-08-29 RX ORDER — CYCLOBENZAPRINE HCL 10 MG
10 TABLET ORAL 2 TIMES DAILY
Status: DISCONTINUED | OUTPATIENT
Start: 2022-08-29 | End: 2022-09-02 | Stop reason: HOSPADM

## 2022-08-29 RX ORDER — CARVEDILOL 6.25 MG/1
6.25 TABLET ORAL 2 TIMES DAILY WITH MEALS
Status: DISCONTINUED | OUTPATIENT
Start: 2022-08-30 | End: 2022-09-02 | Stop reason: HOSPADM

## 2022-08-29 RX ORDER — GABAPENTIN 100 MG/1
100 CAPSULE ORAL 3 TIMES DAILY
Status: DISCONTINUED | OUTPATIENT
Start: 2022-08-29 | End: 2022-09-02 | Stop reason: HOSPADM

## 2022-08-29 RX ORDER — FUROSEMIDE 10 MG/ML
40 INJECTION INTRAMUSCULAR; INTRAVENOUS ONCE
Status: COMPLETED | OUTPATIENT
Start: 2022-08-29 | End: 2022-08-29

## 2022-08-29 RX ORDER — INSULIN LISPRO 100 [IU]/ML
2-12 INJECTION, SOLUTION INTRAVENOUS; SUBCUTANEOUS
Status: DISCONTINUED | OUTPATIENT
Start: 2022-08-29 | End: 2022-09-01

## 2022-08-29 RX ADMIN — FUROSEMIDE 40 MG: 10 INJECTION, SOLUTION INTRAMUSCULAR; INTRAVENOUS at 19:10

## 2022-08-29 RX ADMIN — NITROGLYCERIN 1 INCH: 20 OINTMENT TOPICAL at 19:10

## 2022-08-29 RX ADMIN — GABAPENTIN 100 MG: 100 CAPSULE ORAL at 23:34

## 2022-08-29 RX ADMIN — CYCLOBENZAPRINE HYDROCHLORIDE 10 MG: 10 TABLET, FILM COATED ORAL at 23:34

## 2022-08-29 RX ADMIN — HEPARIN SODIUM 5000 UNITS: 5000 INJECTION INTRAVENOUS; SUBCUTANEOUS at 23:34

## 2022-08-29 NOTE — ED ATTENDING ATTESTATION
8/29/2022  I, Toney Arana MD, saw and evaluated the patient  I have discussed the patient with the resident/non-physician practitioner and agree with the resident's/non-physician practitioner's findings, Plan of Care, and MDM as documented in the resident's/non-physician practitioner's note, except where noted  All available labs and Radiology studies were reviewed  I was present for key portions of any procedure(s) performed by the resident/non-physician practitioner and I was immediately available to provide assistance  At this point I agree with the current assessment done in the Emergency Department    I have conducted an independent evaluation of this patient a history and physical is as follows:  Pt was to be admitted for sob and signed out ama Pt has not taken meds since 8/21 Pt has had increasing sob and swelling of legs Pt has home O2 pt has mild cp with exertion PE: alert heart reg lungs decreased BS at bases  abd soft nontender ext + edema bilat  MDM: cxr lasix ntg labs will admit  ED Course         Critical Care Time  Procedures

## 2022-08-29 NOTE — ASSESSMENT & PLAN NOTE
Lab Results   Component Value Date    EGFR 46 01/28/2022    EGFR 47 01/27/2022    EGFR 38 01/26/2022    CREATININE 1 46 (H) 01/28/2022    CREATININE 1 43 (H) 01/27/2022    CREATININE 1 70 (H) 01/26/2022   POA with creatinine 2 10, baseline around 1 4 to 1 6 (due to DM and HTN,)  · Due to UTI/bacteremia causing ATN  · Renal US - no hydronephrosis on the left, right with mild prominence of the renal pelvis with no calyceal dilation similar to June 18, 2018  · Diuretics had been held then received 1 dose of Torsemide 10 mg on 1/26 and 10 mg today   2 doses of Diamox 250 mg ordered for elevated bicarb   · Creatinine 1 46 today  · Treatment per nephrology - input appreciated  · Avoid nephrotoxic medications and hypotension Area H Indication Text: Tumors in this location are included in Area H (eyelids, eyebrows, central face, nose, lips, chin, ear, pre-auricular, post-auricular, temple, genitalia, hands, feet, ankles and areola).  Tissue conservation is critical in these anatomic locations.

## 2022-08-29 NOTE — ED PROVIDER NOTES
History  Chief Complaint   Patient presents with    Shortness of Breath     Chest pain     Patient is a 53-year-old male past medical history COPD, insulin-dependent diabetes, CAD, CHF, GERD, hyperlipidemia, hypertension, who presents to the ED for evaluation of shortness of breath for the past 1 week worsening last night  The patient reports he has not taken his prescriptions since the 21st due to a physician cancelling them being unable to afford refilling these prescriptions  Patient reports worsening bilateral lower extremity swelling over the past 1 week and worsening shortness of breath  Patient is on 2 L nasal cannula at baseline at home with no need for additional oxygen recently  Patient reports his blood sugars have been in the 300s to 400s even with taking his insulin  Reports worsening sores to bilateral lower extremities  Denies any chest pain, cough, sick contacts, abdominal pain, nausea vomiting diarrhea, changes in bowel or bladder habits, or other complaints or concerns at this time  Prior to Admission Medications   Prescriptions Last Dose Informant Patient Reported? Taking?   aspirin (ECOTRIN LOW STRENGTH) 81 mg EC tablet   No No   Sig: Take 1 tablet (81 mg total) by mouth daily   carvedilol (COREG) 6 25 mg tablet   No No   Sig: Take 1 tablet (6 25 mg total) by mouth 2 (two) times a day with meals   cyclobenzaprine (FLEXERIL) 10 mg tablet   Yes No   Sig: Take 10 mg by mouth 2 (two) times a day   ferrous sulfate 325 (65 Fe) mg tablet   No No   Sig: Take 1 tablet (325 mg total) by mouth daily with breakfast   fluticasone-umeclidinium-vilanterol (Trelegy Ellipta) 100-62 5-25 MCG/INH inhaler   No No   Sig: Inhale 1 puff daily Rinse mouth after use     furosemide (LASIX) 40 mg tablet   No No   Sig: Take 1 tablet (40 mg total) by mouth daily   gabapentin (NEURONTIN) 100 mg capsule   No No   Sig: Take 1 capsule (100 mg total) by mouth 3 (three) times a day   glucose blood test strip   No No Sig: Use 1 each daily as needed (check sugars) Use as instructed  Uses One Touch Ultra test strip   insulin lispro protamine-insulin lispro (HumaLOG Mix 75/25) 100 units/mL   No No   Sig: Inject 45 Units under the skin 3 (three) times a day   losartan (COZAAR) 50 mg tablet   No No   Sig: Take 1 tablet (50 mg total) by mouth daily   potassium chloride (K-DUR,KLOR-CON) 20 mEq tablet   No No   Sig: Take 1 tablet (20 mEq total) by mouth daily   simvastatin (ZOCOR) 40 mg tablet   No No   Sig: Take 1 tablet (40 mg total) by mouth daily   tamsulosin (FLOMAX) 0 4 mg   No No   Sig: Take 1 capsule (0 4 mg total) by mouth daily with dinner      Facility-Administered Medications: None       Past Medical History:   Diagnosis Date    Abdominal pain     MARANDA [facility] injury) ([facility] ) 6/19/2018    Cardiac disease     [facility]est[facility]     COPD, severe (HCC)     Coronary artery disease     DDD (degenerative disc disease), lumbar 9/1/2020    Diabetes mellitus ([facility] )     Dyspnea     GERD (gastroesophageal reflux disease)     Hyperlipidemia     Hypertension     MI (myocardial infarction) ([facility] )     with 3 stents    Nodule of apex of right lung     JACQUELINE (obstructive sleep apnea)     Prostate cancer [facility])        Past Surgical History:   Procedure Laterality Date    ABDOMINAL SURGERY      exploratory    ANGIOPLASTY      3 stents    APPENDECTOMY      COLONOSCOPY  2015    CT NEEDLE BIOPSY LUNG  11/3/2020    ESOPHAGOGASTRODUODENOSCOPY N/A 10/2/2017    Procedure: ESOPHAGOGASTRODUODENOSCOPY (EGD); Surgeon: Birgit Bruner MD;  Location: [facility];   Service: Gastroenterology    IR THORACENTESIS  11/3/2020    KNEE CARTILAGE SURGERY      OTHER SURGICAL HISTORY      stent placement    PROSTATE SURGERY      SKIN GRAFT      Basal cell CA back       Family History   Problem Relation Age of Onset    Heart disease Father     Other Father         Mesothelioma      I have reviewed and agree with the history as documented  E-Cigarette/Vaping    E-Cigarette Use Never User      E-Cigarette/Vaping Substances    Nicotine No     THC No     CBD No     Flavoring No     Other No     Unknown No      Social History     Tobacco Use    Smoking status: Former Smoker     Packs/day: 1 50     Years: 50 00     Pack years: 75 00     Start date: 38     Quit date: 2020     Years since quittin 1    Smokeless tobacco: Never Used   Vaping Use    Vaping Use: Never used   Substance Use Topics    Alcohol use: Never    Drug use: No        Review of Systems   Constitutional: Negative for chills and fever  HENT: Negative for ear pain and sore throat  Eyes: Negative for pain and visual disturbance  Respiratory: Positive for shortness of breath  Negative for cough  Cardiovascular: Positive for leg swelling  Negative for chest pain and palpitations  Gastrointestinal: Negative for abdominal pain and vomiting  Genitourinary: Negative for dysuria and hematuria  Musculoskeletal: Negative for arthralgias and back pain  Skin: Positive for wound (bilateral lower extremities)  Negative for color change and rash  Neurological: Negative for seizures and syncope  All other systems reviewed and are negative  Physical Exam  ED Triage Vitals [22 1807]   Temperature Pulse Respirations Blood Pressure SpO2   98 7 °F (37 1 °C) 65 22 (!) 195/92 96 %      Temp Source Heart Rate Source Patient Position - Orthostatic VS BP Location FiO2 (%)   Oral Monitor Sitting Right arm --      Pain Score       4             Orthostatic Vital Signs  Vitals:    22 2200 22 2308 22 0717 22 1537   BP: 168/77 120/87 143/65 144/57   Pulse: 84 83 71 71   Patient Position - Orthostatic VS: Lying Sitting         Physical Exam  Vitals and nursing note reviewed  Constitutional:       General: He is not in acute distress  Appearance: He is well-developed  He is not diaphoretic     HENT:      Head: Normocephalic and atraumatic  Mouth/Throat:      Mouth: Mucous membranes are moist       Pharynx: Oropharynx is clear  Eyes:      Extraocular Movements: Extraocular movements intact  Conjunctiva/sclera: Conjunctivae normal       Pupils: Pupils are equal, round, and reactive to light  Cardiovascular:      Rate and Rhythm: Normal rate and regular rhythm  Pulses: Normal pulses  Heart sounds: Normal heart sounds  No murmur heard  Pulmonary:      Effort: Pulmonary effort is normal  No accessory muscle usage or respiratory distress  Breath sounds: Examination of the right-lower field reveals decreased breath sounds  Examination of the left-lower field reveals decreased breath sounds  Decreased breath sounds present  Abdominal:      Palpations: Abdomen is soft  Tenderness: There is no abdominal tenderness  Musculoskeletal:         General: Normal range of motion  Cervical back: Normal range of motion and neck supple  Right lower leg: Edema (1+ pitting) present  Left lower leg: Edema (1+ pitting) present  Skin:     General: Skin is warm and dry  Capillary Refill: Capillary refill takes less than 2 seconds  Comments: Ulceration noted to the medial right distal leg with other areas of healing ecchymoses and skin changes noted to the bilateral lower legs  Neurological:      Mental Status: He is alert and oriented to person, place, and time  Psychiatric:         Attention and Perception: Attention normal          Mood and Affect: Mood normal          Behavior: Behavior is cooperative           ED Medications  Medications   aspirin (ECOTRIN LOW STRENGTH) EC tablet 81 mg (81 mg Oral Given 8/30/22 0849)   carvedilol (COREG) tablet 6 25 mg (6 25 mg Oral Given 8/30/22 1613)   cyclobenzaprine (FLEXERIL) tablet 10 mg (10 mg Oral Given 8/30/22 0849)   ferrous sulfate tablet 325 mg (325 mg Oral Given 8/30/22 0856)   fluticasone-vilanterol (BREO ELLIPTA) 100-25 mcg/inh inhaler 1 puff (1 puff Inhalation Given 8/30/22 0851)   umeclidinium bromide (INCRUSE ELLIPTA) 62 5 mcg/inh inhaler AEPB 1 puff (1 puff Inhalation Given 8/30/22 0851)   gabapentin (NEURONTIN) capsule 100 mg (100 mg Oral Given 8/30/22 1613)   insulin aspart protamine-insulin aspart (NovoLOG 70/30) 100 units/mL subcutaneous injection 45 Units (45 Units Subcutaneous Given 8/30/22 1251)   pravastatin (PRAVACHOL) tablet 80 mg (80 mg Oral Given 8/30/22 1613)   tamsulosin (FLOMAX) capsule 0 4 mg (0 4 mg Oral Given 8/30/22 1613)   furosemide (LASIX) injection 40 mg (40 mg Intravenous Given 8/30/22 0850)   heparin (porcine) subcutaneous injection 5,000 Units (5,000 Units Subcutaneous Given 8/30/22 1255)   insulin lispro (HumaLOG) 100 units/mL subcutaneous injection 4-20 Units (8 Units Subcutaneous Given 8/30/22 1247)   insulin lispro (HumaLOG) 100 units/mL subcutaneous injection 2-12 Units (2 Units Subcutaneous Not Given 8/30/22 0208)   losartan (COZAAR) tablet 25 mg (has no administration in time range)   furosemide (LASIX) injection 40 mg (40 mg Intravenous Given 8/29/22 1910)   nitroglycerin (NITRO-BID) 2 % TD ointment 1 inch (1 inch Topical Given 8/29/22 1910)       Diagnostic Studies  Results Reviewed     Procedure Component Value Units Date/Time    Protime-INR [[id number]]  (Normal) Collected: 08/30/22 0500    Lab Status: Final result Specimen: Blood from Arm, Left Updated: 08/30/22 0610     Protime 13 5 seconds      INR 2 37    Basic metabolic panel [[id number]]  (Abnormal) Collected: 08/30/22 0500    Lab Status: Final result Specimen: Blood from Arm, Left Updated: 08/30/22 1365     Sodium 141 mmol/L      Potassium 4 1 mmol/L      Chloride 103 mmol/L      CO2 33 mmol/L      ANION GAP 5 mmol/L      BUN 27 mg/dL      Creatinine 1 42 mg/dL      Glucose 272 mg/dL      Calcium 8 4 mg/dL      eGFR 48 ml/min/1 73sq m     Narrative:      [place] guidelines for Chronic Kidney Disease (CKD):     Stage 1 with normal or high GFR (GFR > 90 mL/min/1 73 square meters)    Stage 2 Mild CKD (GFR = 60-89 mL/min/1 73 square meters)    Stage 3A Moderate CKD (GFR = 45-59 mL/min/1 73 square meters)    Stage 3B Moderate CKD (GFR = 30-44 mL/min/1 73 square meters)    Stage 4 Severe CKD (GFR = 15-29 mL/min/1 73 square meters)    Stage 5 End Stage CKD (GFR <15 mL/min/1 73 square meters)  Note: GFR calculation is accurate only with a steady state creatinine    Magnesium [[id number]]  (Normal) Collected: 08/30/22 0500    Lab Status: Final result Specimen: Blood from Arm, Left Updated: 08/30/22 0603     Magnesium 2 1 mg/dL     CBC (With Platelets) [[id number]]  (Abnormal) Collected: 08/30/22 0500    Lab Status: Final result Specimen: Blood from Arm, Left Updated: 08/30/22 0524     WBC 6 84 Thousand/uL      RBC 4 08 Million/uL      Hemoglobin 11 0 g/dL      Hematocrit 34 3 %      MCV 84 fL      MCH 27 0 pg      MCHC 32 1 g/dL      RDW 15 9 %      Platelets 957 Thousands/uL      MPV 10 9 fL     HS Troponin I 2hr [[id number]]  (Normal) Collected: 08/29/22 2144    Lab Status: Final result Specimen: Blood from Arm, Right Updated: 08/29/22 2222     hs TnI 2hr 18 ng/L      Delta 2hr hsTnI 2 ng/L     HS Troponin 0hr (reflex protocol) [[id number]]  (Normal) Collected: 08/29/22 1901    Lab Status: Final result Specimen: Blood from Arm, Left Updated: 08/29/22 1946     hs TnI 0hr 16 ng/L     NT-BNP PRO [[id number]]  (Abnormal) Collected: 08/29/22 1901    Lab Status: Final result Specimen: Blood from Arm, Left Updated: 08/29/22 1936     NT-proBNP 2,510 pg/mL     Comprehensive metabolic panel [[id number]]  (Abnormal) Collected: 08/29/22 1901    Lab Status: Final result Specimen: Blood from Arm, Left Updated: 08/29/22 1935     Sodium 138 mmol/L      Potassium 4 2 mmol/L      Chloride 101 mmol/L      CO2 32 mmol/L      ANION GAP 5 mmol/L      BUN 29 mg/dL      Creatinine 1 56 mg/dL      Glucose 312 mg/dL      Calcium 8 2 mg/dL      Corrected Calcium 9 3 mg/dL AST 11 U/L      ALT 20 U/L      Alkaline Phosphatase 86 U/L      Total Protein 7 1 g/dL      Albumin 2 6 g/dL      Total Bilirubin 0 20 mg/dL      eGFR 43 ml/min/1 73sq m     Narrative:      [place] guidelines for Chronic Kidney Disease (CKD):     Stage 1 with normal or high GFR (GFR > 90 mL/min/1 73 square meters)    Stage 2 Mild CKD (GFR = 60-89 mL/min/1 73 square meters)    Stage 3A Moderate CKD (GFR = 45-59 mL/min/1 73 square meters)    Stage 3B Moderate CKD (GFR = 30-44 mL/min/1 73 square meters)    Stage 4 Severe CKD (GFR = 15-29 mL/min/1 73 square meters)    Stage 5 End Stage CKD (GFR <15 mL/min/1 73 square meters)  Note: GFR calculation is accurate only with a steady state creatinine    CBC and differential [[id number]]  (Abnormal) Collected: 08/29/22 1901    Lab Status: Final result Specimen: Blood from Arm, Left Updated: 08/29/22 1922     WBC 7 77 Thousand/uL      RBC 4 44 Million/uL      Hemoglobin 12 1 g/dL      Hematocrit 37 5 %      MCV 85 fL      MCH 27 3 pg      MCHC 32 3 g/dL      RDW 15 9 %      MPV 11 1 fL      Platelets 195 Thousands/uL      nRBC 0 /100 WBCs      Neutrophils Relative 79 %      Immat GRANS % 1 %      Lymphocytes Relative 11 %      Monocytes Relative 8 %      Eosinophils Relative 1 %      Basophils Relative 0 %      Neutrophils Absolute 6 17 Thousands/µL      Immature Grans Absolute 0 06 Thousand/uL      Lymphocytes Absolute 0 86 Thousands/µL      Monocytes Absolute 0 59 Thousand/µL      Eosinophils Absolute 0 08 Thousand/µL      Basophils Absolute 0 01 Thousands/µL                  XR chest 1 view portable   Final Result by Rafa Jensen MD (08/30 5992)   Persistent right lung volume loss after surgery with right basilar pleural effusion  No acute pulmonary findings        Workstation performed: [id number]               Procedures  Procedures      ED Course                                       MDM  Number of Diagnoses or Management Options  Diagnosis management comments: Patient is a 30-year-old male past medical history COPD, insulin-dependent diabetes, CAD, CHF, GERD, hyperlipidemia, hypertension, who presents to the ED for evaluation of shortness of breath for the past 1 week worsening last night  DDx:  CHF exacerbation versus ACS versus COPD exacerbation versus pneumonia (unlikely)    EKG, chest x-ray, CBC, CMP, troponin, BNP ordered  Hypertension noted, patient treated with 1 in nitroglycerin paste  Treated with Lasix 40 mg IV  Labs and diagnostics reviewed  BNP noted to be 2510 elevated but improved from 3143 from a week ago, CXR shows a worsening right pleural effusion  Initial Trop was 16 but he denies any acute chest pain  BUN 29, CR 1 56, Glucose 312 (IDDM not controlled), WBC 7 77, Hgb 12 1    Case discussed with Dr Bryan Silverio who admit patient to inpatient with telemetry  Disposition  Final diagnoses:   Acute on chronic congestive heart failure, unspecified heart failure type [facility])     Time reflects when diagnosis was documented in both MDM as applicable and the Disposition within this note     Time User Action Codes Description Comment    8/29/2022  9:44 PM Monica Gilbert Add [I50 9] Acute on chronic congestive heart failure, unspecified heart failure type [facility])       ED Disposition     ED Disposition   Admit    Condition   Stable    Date/Time   Mon Aug 29, 2022  9:44 PM    Comment   Case was discussed with VIKTORIA and the patient's admission status was agreed to be Admission Status: inpatient status to the service of Dr Bryan Silverio             Follow-up Information    None         Current Discharge Medication List      CONTINUE these medications which have NOT CHANGED    Details   aspirin (ECOTRIN LOW STRENGTH) 81 mg EC tablet Take 1 tablet (81 mg total) by mouth daily  Qty: 30 tablet, Refills: 2    Associated Diagnoses: Essential hypertension; Coronary artery disease involving native coronary artery of native heart without angina pectoris      carvedilol (COREG) 6 25 mg tablet Take 1 tablet (6 25 mg total) by mouth 2 (two) times a day with meals  Qty: 180 tablet, Refills: 0    Associated Diagnoses: Essential hypertension; Coronary artery disease involving native coronary artery of native heart without angina pectoris      cyclobenzaprine (FLEXERIL) 10 mg tablet Take 10 mg by mouth 2 (two) times a day  Qty: 60 tablet, Refills: 5      ferrous sulfate 325 (65 Fe) mg tablet Take 1 tablet (325 mg total) by mouth daily with breakfast  Qty: 30 tablet, Refills: 5    Associated Diagnoses: Stage 3a chronic kidney disease ([facility])      fluticasone-umeclidinium-vilanterol (Trelegy Ellipta) 100-62 5-25 MCG/INH inhaler Inhale 1 puff daily Rinse mouth after use  Qty: 1 each, Refills: 5    Associated Diagnoses: COPD exacerbation ([facility])      furosemide (LASIX) 40 mg tablet Take 1 tablet (40 mg total) by mouth daily  Qty: 90 tablet, Refills: 0    Associated Diagnoses: Acute on chronic diastolic (congestive) heart failure ([facility])      gabapentin (NEURONTIN) 100 mg capsule Take 1 capsule (100 mg total) by mouth 3 (three) times a day  Qty: 90 capsule, Refills: 0    Associated Diagnoses: Chronic bilateral low back pain without sciatica      glucose blood test strip Use 1 each daily as needed (check sugars) Use as instructed  Uses One Touch Ultra test strip  Qty: 50 strip, Refills: 2    Comments: Needs to be delivered   Has no transportation  Associated Diagnoses: Type 2 diabetes mellitus with hypoglycemia without coma, with long-term current use of insulin ([facility])      insulin lispro protamine-insulin lispro (HumaLOG Mix 75/25) 100 units/mL Inject 45 Units under the skin 3 (three) times a day  Qty: 40 mL, Refills: 0    Associated Diagnoses: Type 2 diabetes mellitus with hypoglycemia without coma, with long-term current use of insulin ([facility])      losartan (COZAAR) 50 mg tablet Take 1 tablet (50 mg total) by mouth daily  Qty: 90 tablet, Refills: 1 Associated Diagnoses: Essential hypertension      potassium chloride (K-DUR,KLOR-CON) 20 mEq tablet Take 1 tablet (20 mEq total) by mouth daily  Qty: 17 tablet, Refills: 0    Comments: Take this pill when you take your lasix  Associated Diagnoses: Chronic respiratory failure with hypoxia and hypercapnia (HCC)      simvastatin (ZOCOR) 40 mg tablet Take 1 tablet (40 mg total) by mouth daily  Qty: 30 tablet, Refills: 5    Associated Diagnoses: Hypercholesterolemia      tamsulosin (FLOMAX) 0 4 mg Take 1 capsule (0 4 mg total) by mouth daily with dinner  Qty: 30 capsule, Refills: 0    Associated Diagnoses: Acute cystitis without hematuria           No discharge procedures on file  PDMP Review       Value Time User    PDMP Reviewed  Yes 8/29/2022 10:03 PM Villa Cunningham DO           ED Provider  Attending physically available and evaluated Markel Jones Sr    I managed the patient along with the ED Attending      Electronically Signed by         Misty Desouza DO  08/30/22 3732

## 2022-08-30 LAB
ANION GAP SERPL CALCULATED.3IONS-SCNC: 5 MMOL/L (ref 4–13)
ATRIAL RATE: 277 BPM
BUN SERPL-MCNC: 27 MG/DL (ref 5–25)
CALCIUM SERPL-MCNC: 8.4 MG/DL (ref 8.3–10.1)
CHLORIDE SERPL-SCNC: 103 MMOL/L (ref 96–108)
CO2 SERPL-SCNC: 33 MMOL/L (ref 21–32)
CREAT SERPL-MCNC: 1.42 MG/DL (ref 0.6–1.3)
ERYTHROCYTE [DISTWIDTH] IN BLOOD BY AUTOMATED COUNT: 15.9 % (ref 11.6–15.1)
GFR SERPL CREATININE-BSD FRML MDRD: 48 ML/MIN/1.73SQ M
GLUCOSE SERPL-MCNC: 231 MG/DL (ref 65–140)
GLUCOSE SERPL-MCNC: 234 MG/DL (ref 65–140)
GLUCOSE SERPL-MCNC: 248 MG/DL (ref 65–140)
GLUCOSE SERPL-MCNC: 272 MG/DL (ref 65–140)
GLUCOSE SERPL-MCNC: 34 MG/DL (ref 65–140)
GLUCOSE SERPL-MCNC: 83 MG/DL (ref 65–140)
HCT VFR BLD AUTO: 34.3 % (ref 36.5–49.3)
HGB BLD-MCNC: 11 G/DL (ref 12–17)
INR PPP: 1.01 (ref 0.84–1.19)
MAGNESIUM SERPL-MCNC: 2.1 MG/DL (ref 1.6–2.6)
MCH RBC QN AUTO: 27 PG (ref 26.8–34.3)
MCHC RBC AUTO-ENTMCNC: 32.1 G/DL (ref 31.4–37.4)
MCV RBC AUTO: 84 FL (ref 82–98)
P AXIS: -88 DEGREES
PLATELET # BLD AUTO: 108 THOUSANDS/UL (ref 149–390)
PMV BLD AUTO: 10.9 FL (ref 8.9–12.7)
POTASSIUM SERPL-SCNC: 4.1 MMOL/L (ref 3.5–5.3)
PROTHROMBIN TIME: 13.5 SECONDS (ref 11.6–14.5)
QRS AXIS: 174 DEGREES
QRSD INTERVAL: 142 MS
QT INTERVAL: 452 MS
QTC INTERVAL: 470 MS
RBC # BLD AUTO: 4.08 MILLION/UL (ref 3.88–5.62)
SODIUM SERPL-SCNC: 141 MMOL/L (ref 135–147)
T WAVE AXIS: 53 DEGREES
VENTRICULAR RATE: 65 BPM
WBC # BLD AUTO: 6.84 THOUSAND/UL (ref 4.31–10.16)

## 2022-08-30 PROCEDURE — 93010 ELECTROCARDIOGRAM REPORT: CPT | Performed by: INTERNAL MEDICINE

## 2022-08-30 PROCEDURE — 82948 REAGENT STRIP/BLOOD GLUCOSE: CPT

## 2022-08-30 PROCEDURE — 85027 COMPLETE CBC AUTOMATED: CPT | Performed by: INTERNAL MEDICINE

## 2022-08-30 PROCEDURE — 99232 SBSQ HOSP IP/OBS MODERATE 35: CPT | Performed by: INTERNAL MEDICINE

## 2022-08-30 PROCEDURE — 80048 BASIC METABOLIC PNL TOTAL CA: CPT | Performed by: INTERNAL MEDICINE

## 2022-08-30 PROCEDURE — 85610 PROTHROMBIN TIME: CPT | Performed by: INTERNAL MEDICINE

## 2022-08-30 PROCEDURE — 83735 ASSAY OF MAGNESIUM: CPT | Performed by: INTERNAL MEDICINE

## 2022-08-30 RX ORDER — INSULIN LISPRO 100 [IU]/ML
5 INJECTION, SOLUTION INTRAVENOUS; SUBCUTANEOUS
Status: DISCONTINUED | OUTPATIENT
Start: 2022-08-30 | End: 2022-08-30

## 2022-08-30 RX ORDER — LOSARTAN POTASSIUM 25 MG/1
25 TABLET ORAL DAILY
Status: DISCONTINUED | OUTPATIENT
Start: 2022-08-31 | End: 2022-08-31

## 2022-08-30 RX ADMIN — FUROSEMIDE 40 MG: 10 INJECTION, SOLUTION INTRAMUSCULAR; INTRAVENOUS at 08:50

## 2022-08-30 RX ADMIN — GABAPENTIN 100 MG: 100 CAPSULE ORAL at 16:13

## 2022-08-30 RX ADMIN — HEPARIN SODIUM 5000 UNITS: 5000 INJECTION INTRAVENOUS; SUBCUTANEOUS at 21:27

## 2022-08-30 RX ADMIN — FLUTICASONE FUROATE AND VILANTEROL TRIFENATATE 1 PUFF: 100; 25 POWDER RESPIRATORY (INHALATION) at 08:51

## 2022-08-30 RX ADMIN — INSULIN LISPRO 8 UNITS: 100 INJECTION, SOLUTION INTRAVENOUS; SUBCUTANEOUS at 12:47

## 2022-08-30 RX ADMIN — CYCLOBENZAPRINE HYDROCHLORIDE 10 MG: 10 TABLET, FILM COATED ORAL at 21:27

## 2022-08-30 RX ADMIN — CYCLOBENZAPRINE HYDROCHLORIDE 10 MG: 10 TABLET, FILM COATED ORAL at 08:49

## 2022-08-30 RX ADMIN — FERROUS SULFATE TAB 325 MG (65 MG ELEMENTAL FE) 325 MG: 325 (65 FE) TAB at 08:56

## 2022-08-30 RX ADMIN — LOSARTAN POTASSIUM 50 MG: 50 TABLET, FILM COATED ORAL at 08:49

## 2022-08-30 RX ADMIN — INSULIN LISPRO 8 UNITS: 100 INJECTION, SOLUTION INTRAVENOUS; SUBCUTANEOUS at 08:57

## 2022-08-30 RX ADMIN — PRAVASTATIN SODIUM 80 MG: 80 TABLET ORAL at 16:13

## 2022-08-30 RX ADMIN — TAMSULOSIN HYDROCHLORIDE 0.4 MG: 0.4 CAPSULE ORAL at 16:13

## 2022-08-30 RX ADMIN — INSULIN ASPART 45 UNITS: 100 INJECTION, SUSPENSION SUBCUTANEOUS at 09:01

## 2022-08-30 RX ADMIN — CARVEDILOL 6.25 MG: 6.25 TABLET, FILM COATED ORAL at 08:56

## 2022-08-30 RX ADMIN — UMECLIDINIUM 1 PUFF: 62.5 AEROSOL, POWDER ORAL at 08:51

## 2022-08-30 RX ADMIN — FUROSEMIDE 40 MG: 10 INJECTION, SOLUTION INTRAMUSCULAR; INTRAVENOUS at 18:47

## 2022-08-30 RX ADMIN — INSULIN ASPART 45 UNITS: 100 INJECTION, SUSPENSION SUBCUTANEOUS at 12:51

## 2022-08-30 RX ADMIN — INSULIN LISPRO 4 UNITS: 100 INJECTION, SOLUTION INTRAVENOUS; SUBCUTANEOUS at 21:28

## 2022-08-30 RX ADMIN — HEPARIN SODIUM 5000 UNITS: 5000 INJECTION INTRAVENOUS; SUBCUTANEOUS at 12:55

## 2022-08-30 RX ADMIN — CARVEDILOL 6.25 MG: 6.25 TABLET, FILM COATED ORAL at 16:13

## 2022-08-30 RX ADMIN — GABAPENTIN 100 MG: 100 CAPSULE ORAL at 08:50

## 2022-08-30 RX ADMIN — GABAPENTIN 100 MG: 100 CAPSULE ORAL at 21:27

## 2022-08-30 RX ADMIN — ASPIRIN 81 MG: 81 TABLET, COATED ORAL at 08:49

## 2022-08-30 NOTE — ASSESSMENT & PLAN NOTE
· CXR appears with increased size of right pleural effusion, has required thoracentesis previously  · Patient currently comfortable on room air, will continue IV Lasix 40 mg b i d   · Continue inhalers for COPD history

## 2022-08-30 NOTE — ASSESSMENT & PLAN NOTE
Wt Readings from Last 3 Encounters:   08/25/22 103 kg (228 lb)   08/21/22 99 8 kg (220 lb)   08/16/22 100 kg (220 lb 7 4 oz)     · Patient noting increasing shortness of breath x3 days along with orthopnea and leg swelling    CXR with evidence of pulmonary vascular congestion/increased size of right pleural effusion  · S/p 40 mg IV Lasix during ED evaluation, continue IV Lasix 40 mg b i d   · Echocardiogram 05/2022 EF 57%, normal diastolic function  · Monitor daily weights, I/O, volume status  · Low-sodium, fluid-restricted diet  · Monitor electrolytes, creatinine while on IV diuresis

## 2022-08-30 NOTE — PLAN OF CARE
Problem: MOBILITY - ADULT  Goal: Maintain or return to baseline ADL function  Description: INTERVENTIONS:  -  Assess patient's ability to carry out ADLs; assess patient's baseline for ADL function and identify physical deficits which impact ability to perform ADLs (bathing, care of mouth/teeth, toileting, grooming, dressing, etc )  - Assess/evaluate cause of self-care deficits   - Assess range of motion  - Assess patient's mobility; develop plan if impaired  - Assess patient's need for assistive devices and provide as appropriate  - Encourage maximum independence but intervene and supervise when necessary  - Involve family in performance of ADLs  - Assess for home care needs following discharge   - Consider OT consult to assist with ADL evaluation and planning for discharge  - Provide patient education as appropriate  Outcome: Progressing  Goal: Maintains/Returns to pre admission functional level  Description: INTERVENTIONS:  - Perform BMAT or MOVE assessment daily    - Set and communicate daily mobility goal to care team and patient/family/caregiver  - Collaborate with rehabilitation services on mobility goals if consulted  - Perform Range of Motion  times a day  - Reposition patient every  hours    - Dangle patient  times a day  - Stand patient  times a day  - Ambulate patient  times a day  - Out of bed to chair  times a day   - Out of bed for meals  times a day  - Out of bed for toileting  - Record patient progress and toleration of activity level   Outcome: Progressing     Problem: Potential for Falls  Goal: Patient will remain free of falls  Description: INTERVENTIONS:  - Educate patient/family on patient safety including physical limitations  - Instruct patient to call for assistance with activity   - Consult OT/PT to assist with strengthening/mobility   - Keep Call bell within reach  - Keep bed low and locked with side rails adjusted as appropriate  - Keep care items and personal belongings within reach  - Initiate and maintain comfort rounds  - Make Fall Risk Sign visible to staff  - Offer Toileting every  Hours, in advance of need  - Initiate/Maintain alarm  - Obtain necessary fall risk management equipment:   - Apply yellow socks and bracelet for high fall risk patients  - Consider moving patient to room near nurses station  Outcome: Progressing     Problem: SAFETY ADULT  Goal: Maintain or return to baseline ADL function  Description: INTERVENTIONS:  -  Assess patient's ability to carry out ADLs; assess patient's baseline for ADL function and identify physical deficits which impact ability to perform ADLs (bathing, care of mouth/teeth, toileting, grooming, dressing, etc )  - Assess/evaluate cause of self-care deficits   - Assess range of motion  - Assess patient's mobility; develop plan if impaired  - Assess patient's need for assistive devices and provide as appropriate  - Encourage maximum independence but intervene and supervise when necessary  - Involve family in performance of ADLs  - Assess for home care needs following discharge   - Consider OT consult to assist with ADL evaluation and planning for discharge  - Provide patient education as appropriate  Outcome: Progressing  Goal: Maintains/Returns to pre admission functional level  Description: INTERVENTIONS:  - Perform BMAT or MOVE assessment daily    - Set and communicate daily mobility goal to care team and patient/family/caregiver  - Collaborate with rehabilitation services on mobility goals if consulted  - Perform Range of Motion  times a day  - Reposition patient every  hours    - Dangle patient times a day  - Stand patient  times a day  - Ambulate patient  times a day  - Out of bed to chair  times a day   - Out of bed for meals  times a day  - Out of bed for toileting  - Record patient progress and toleration of activity level   Outcome: Progressing  Goal: Patient will remain free of falls  Description: INTERVENTIONS:  - Educate patient/family on patient safety including physical limitations  - Instruct patient to call for assistance with activity   - Consult OT/PT to assist with strengthening/mobility   - Keep Call bell within reach  - Keep bed low and locked with side rails adjusted as appropriate  - Keep care items and personal belongings within reach  - Initiate and maintain comfort rounds  - Make Fall Risk Sign visible to staff  - Offer Toileting every  Hours, in advance of need  - Initiate/Maintain alarm  - Obtain necessary fall risk management equipment:  - Apply yellow socks and bracelet for high fall risk patients  - Consider moving patient to room near nurses station  Outcome: Progressing     Problem: Knowledge Deficit  Goal: Patient/family/caregiver demonstrates understanding of disease process, treatment plan, medications, and discharge instructions  Description: Complete learning assessment and assess knowledge base    Interventions:  - Provide teaching at level of understanding  - Provide teaching via preferred learning methods  Outcome: Progressing     Problem: CARDIOVASCULAR - ADULT  Goal: Maintains optimal cardiac output and hemodynamic stability  Description: INTERVENTIONS:  - Monitor I/O, vital signs and rhythm  - Monitor for S/S and trends of decreased cardiac output  - Administer and titrate ordered vasoactive medications to optimize hemodynamic stability  - Assess quality of pulses, skin color and temperature  - Assess for signs of decreased coronary artery perfusion  - Instruct patient to report change in severity of symptoms  Outcome: Progressing  Goal: Absence of cardiac dysrhythmias or at baseline rhythm  Description: INTERVENTIONS:  - Continuous cardiac monitoring, vital signs, obtain 12 lead EKG if ordered  - Administer antiarrhythmic and heart rate control medications as ordered  - Monitor electrolytes and administer replacement therapy as ordered  Outcome: Progressing     Problem: RESPIRATORY - ADULT  Goal: Achieves optimal ventilation and oxygenation  Description: INTERVENTIONS:  - Assess for changes in respiratory status  - Assess for changes in mentation and behavior  - Position to facilitate oxygenation and minimize respiratory effort  - Oxygen administered by appropriate delivery if ordered  - Initiate smoking cessation education as indicated  - Encourage broncho-pulmonary hygiene including cough, deep breathe, Incentive Spirometry  - Assess the need for suctioning and aspirate as needed  - Assess and instruct to report SOB or any respiratory difficulty  - Respiratory Therapy support as indicated  Outcome: Progressing     Problem: METABOLIC, FLUID AND ELECTROLYTES - ADULT  Goal: Electrolytes maintained within normal limits  Description: INTERVENTIONS:  - Monitor labs and assess patient for signs and symptoms of electrolyte imbalances  - Administer electrolyte replacement as ordered  - Monitor response to electrolyte replacements, including repeat lab results as appropriate  - Instruct patient on fluid and nutrition as appropriate  Outcome: Progressing  Goal: Fluid balance maintained  Description: INTERVENTIONS:  - Monitor labs   - Monitor I/O and WT  - Instruct patient on fluid and nutrition as appropriate  - Assess for signs & symptoms of volume excess or deficit  Outcome: Progressing  Goal: Glucose maintained within target range  Description: INTERVENTIONS:  - Monitor Blood Glucose as ordered  - Assess for signs and symptoms of hyperglycemia and hypoglycemia  - Administer ordered medications to maintain glucose within target range  - Assess nutritional intake and initiate nutrition service referral as needed  Outcome: Progressing

## 2022-08-30 NOTE — ASSESSMENT & PLAN NOTE
Lab Results   Component Value Date    HGBA1C 10 3 (H) 07/06/2022       No results for input(s): POCGLU in the last 72 hours      Blood Sugar Average: Last 72 hrs:  ·  not controlled and with hyperglycemia on presentation, patient admitting to missed doses of medications  · Resume Humalog mix 45 units t i d  with meals, add correctional insulin with Accu-Cheks while inpatient  · Carb controlled diet  · Initiate hypoglycemia protocol

## 2022-08-30 NOTE — H&P
[address] [facility]  H&P- Barb Costa Sr  1945, 78 y o  male MRN: [id number]  Unit/Bed#: ED 28 Encounter: [id number]  Primary Care Provider: Kay Elizabeth MD   Date and time admitted to hospital: 8/29/2022  5:53 PM    * Acute on chronic diastolic (congestive) heart failure  Assessment & Plan  Wt Readings from Last 3 Encounters:   08/25/22 103 kg (228 lb)   08/21/22 99 8 kg (220 lb)   08/16/22 100 kg (220 lb 7 4 oz)     · Patient noting increasing shortness of breath x3 days along with orthopnea and leg swelling    CXR with evidence of pulmonary vascular congestion/increased size of right pleural effusion  · S/p 40 mg IV Lasix during ED evaluation, continue IV Lasix 40 mg b i d   · Echocardiogram 05/2022 EF 29%, normal diastolic function  · Monitor daily weights, I/O, volume status  · Low-sodium, fluid-restricted diet  · Monitor electrolytes, creatinine while on IV diuresis        Pleural effusion on right  Assessment & Plan  · CXR appears with increased size of right pleural effusion, has required thoracentesis previously  · Will attempt trial of diuresis 1st, however if patient with persistent/worsening shortness of breath will need to consider IR thoracentesis    Frequent hospital admissions  Assessment & Plan  · Cm consult    Medication management  Assessment & Plan  · Patient stating he had run out of several medications at home, unable to refill as apparently orders were cancelled   · Cm consult    CKD (chronic kidney disease) stage 3, GFR 30-59 ml/min [facility])  Assessment & Plan  Lab Results   Component Value Date    EGFR 43 08/29/2022    EGFR 39 08/21/2022    EGFR 39 08/21/2022    CREATININE 1 56 (H) 08/29/2022    CREATININE 1 67 (H) 08/21/2022    CREATININE 1 69 (H) 08/21/2022   · Creatinine at baseline, monitor with BMP    Chronic respiratory failure with hypoxia (HCC)  Assessment & Plan  · On 3L NC continuously and at baseline oxygen requirement  · Continue supplemental oxygen as needed to maintain SaO2 greater than 88%  · Incentive spirometry, pulmonary toileting    Essential hypertension  Assessment & Plan  · Continue home antihypertensives    COPD, severe ([address] )  Assessment & Plan  · Not in acute exacerbation, continue home inhalers    Type 2 diabetes mellitus with hyperglycemia, with long-term current use of insulin (HCC)  Assessment & Plan  Lab Results   Component Value Date    HGBA1C 10 3 (H) 07/06/2022       No results for input(s): POCGLU in the last 72 hours  Blood Sugar Average: Last 72 hrs:  ·  not controlled and with hyperglycemia on presentation, patient admitting to missed doses of medications  · Resume Humalog mix 45 units t i d  with meals, add correctional insulin with Accu-Cheks while inpatient  · Carb controlled diet  · Initiate hypoglycemia protocol      VTE Prophylaxis: Heparin  / sequential compression device   Code Status:  Full code  POLST: POLST form is not discussed and not completed at this time  Discussion with family:  Patient declines at this time    Anticipated Length of Stay:  Patient will be admitted on an Inpatient basis with an anticipated length of stay of  greater than 2 midnights  Justification for Hospital Stay: Please see detailed plans noted above  Chief Complaint:     Shortness of breath  History of Present Illness:  John Conley Sr  is a 78 y o  male who presents for worsening shortness of breath over the past 3 days  Patient's past medical history as below particular significant for chronic diastolic heart failure, COPD, prior history of lung cancer s/p resection and radiation, recurrent right pleural effusion, and uncontrolled type 2 diabetes on intensive insulin therapy  Of note patient was briefly hospitalized here the evening of 08/21/2022 with chest pain and additionally for medication management however left AMA due to prolonged ED hold time      He states after he left he attempted to refill several was medications but states he was unable to as many orders were cancelled, though of note appears he followed up with his PCP after discharge  For the past 3 days, however, he noted increased shortness of breath particularly with exertion associated with some intermittent substernal chest pain, intermittent cough nonproductive of sputum, orthopnea, increased lower extremity edema  He denies any fevers/chills, apparent weight gain, wheezing, or dizziness/lightheadedness  During ED evaluation here concern was noted for acute on chronic heart failure exacerbation for which he was provided IV Lasix and is admitted for further management      Review of Systems:    Constitutional:  Denies fever or chills   Eyes:  Denies change in visual acuity   HENT:  Denies nasal congestion or sore throat   Respiratory:  Denies cough but reports shortness of breath  Cardiovascular:  Denies chest pain but reports edema  GI:  Denies abdominal pain, nausea, vomiting, bloody stools or diarrhea   :  Denies dysuria   Musculoskeletal:  Denies back pain or joint pain   Integument:  Denies rash   Neurologic:  Denies headache, focal weakness or sensory changes   Endocrine:  Denies polyuria or polydipsia   Lymphatic:  Denies swollen glands   Psychiatric:  Denies depression or anxiety     Past Medical and Surgical History:   Past Medical History:   Diagnosis Date    Abdominal pain     MARANDA (acute kidney injury) ([facility] ) 6/19/2018    Cardiac disease     [facility]est[facility]     COPD, severe (HCC)     Coronary artery disease     DDD (degenerative disc disease), lumbar 9/1/2020    Diabetes mellitus ([facility] )     Dyspnea     GERD (gastroesophageal reflux disease)     Hyperlipidemia     Hypertension     MI (myocardial infarction) ([facility] )     with 3 stents    Nodule of apex of right lung     JACQUELINE (obstructive sleep apnea)     Prostate cancer [facility])      Past Surgical History:   Procedure Laterality Date    ABDOMINAL SURGERY      exploratory    ANGIOPLASTY      3 stents    APPENDECTOMY      COLONOSCOPY  2015    CT NEEDLE BIOPSY LUNG  11/3/2020    ESOPHAGOGASTRODUODENOSCOPY N/A 10/2/2017    Procedure: ESOPHAGOGASTRODUODENOSCOPY (EGD); Surgeon: Debo Adams MD;  Location: [facility]; Service: Gastroenterology    IR THORACENTESIS  11/3/2020    KNEE CARTILAGE SURGERY      OTHER SURGICAL HISTORY      stent placement    PROSTATE SURGERY      SKIN GRAFT      Basal cell CA back       Meds/Allergies:  No current facility-administered medications for this encounter  Current Outpatient Medications:     aspirin (ECOTRIN LOW STRENGTH) 81 mg EC tablet, Take 1 tablet (81 mg total) by mouth daily, Disp: 30 tablet, Rfl: 2    carvedilol (COREG) 6 25 mg tablet, Take 1 tablet (6 25 mg total) by mouth 2 (two) times a day with meals, Disp: 180 tablet, Rfl: 0    cyclobenzaprine (FLEXERIL) 10 mg tablet, Take 10 mg by mouth 2 (two) times a day, Disp: 60 tablet, Rfl: 5    ferrous sulfate 325 (65 Fe) mg tablet, Take 1 tablet (325 mg total) by mouth daily with breakfast, Disp: 30 tablet, Rfl: 5    fluticasone-umeclidinium-vilanterol (Trelegy Ellipta) 100-62 5-25 MCG/INH inhaler, Inhale 1 puff daily Rinse mouth after use , Disp: 1 each, Rfl: 5    furosemide (LASIX) 40 mg tablet, Take 1 tablet (40 mg total) by mouth daily, Disp: 90 tablet, Rfl: 0    gabapentin (NEURONTIN) 100 mg capsule, Take 1 capsule (100 mg total) by mouth 3 (three) times a day, Disp: 90 capsule, Rfl: 0    glucose blood test strip, Use 1 each daily as needed (check sugars) Use as instructed   Uses One Touch Ultra test strip, Disp: 50 strip, Rfl: 2    insulin lispro protamine-insulin lispro (HumaLOG Mix 75/25) 100 units/mL, Inject 45 Units under the skin 3 (three) times a day, Disp: 40 mL, Rfl: 0    losartan (COZAAR) 50 mg tablet, Take 1 tablet (50 mg total) by mouth daily, Disp: 90 tablet, Rfl: 1    potassium chloride (K-DUR,KLOR-CON) 20 mEq tablet, Take 1 tablet (20 mEq total) by mouth daily, Disp: 17 tablet, Rfl: 0    simvastatin (ZOCOR) 40 mg tablet, Take 1 tablet (40 mg total) by mouth daily, Disp: 30 tablet, Rfl: 5    tamsulosin (FLOMAX) 0 4 mg, Take 1 capsule (0 4 mg total) by mouth daily with dinner, Disp: 30 capsule, Rfl: 0    Allergies: Allergies   Allergen Reactions    Crestor [Rosuvastatin] Other (See Comments)     Unknown      Metformin GI Intolerance     History:  Marital Status:      Substance Use History:   Social History     Substance and Sexual Activity   Alcohol Use Never     Social History     Tobacco Use   Smoking Status Former Smoker    Packs/day: 1 50    Years: 50 00    Pack years: 75 00    Start date: 38    Quit date: 2020    Years since quittin 1   Smokeless Tobacco Never Used     Social History     Substance and Sexual Activity   Drug Use No       Family History:  Family History   Problem Relation Age of Onset    Heart disease Father     Other Father         Mesothelioma        Physical Exam:     Vitals:   Blood Pressure: 168/77 (22 2200)  Pulse: 84 (22 2200)  Temperature: 98 7 °F (37 1 °C) (22 180)  Temp Source: Oral (22 180)  Respirations: 20 (22 2100)  SpO2: 94 % (22 2200)    Constitutional:  Disheveled-appearing, obese, no acute distress, non-toxic appearance   Eyes:  PERRL, conjunctiva normal   HENT:  Atraumatic, external ears normal, nose normal, oropharynx moist, no pharyngeal exudates  Neck- normal range of motion, no tenderness, supple   Respiratory:  No respiratory distress, decreased breath sounds right greater than left, bibasilar rales no wheezing   Cardiovascular:  Normal rate, normal rhythm, no murmurs, no gallops, no rubs   GI:  Soft, nondistended, normal bowel sounds, nontender, no organomegaly, no mass, no rebound, no guarding   :  No costovertebral angle tenderness   Musculoskeletal:  Bilateral lower extremity edema, no tenderness, no deformities   Back- no tenderness  Integument:  Well hydrated, bilateral chronic venous stasis dermatitis   Lymphatic:  No lymphadenopathy noted   Neurologic:  Alert &awake, communicative, CN 2-12 normal, normal motor function, normal sensory function, no focal deficits noted   Psychiatric:  Speech and behavior appropriate       Lab Results: I have personally reviewed pertinent reports  Results from last 7 days   Lab Units 08/29/22  1901   WBC Thousand/uL 7 77   HEMOGLOBIN g/dL 12 1   HEMATOCRIT % 37 5   PLATELETS Thousands/uL 122*   NEUTROS PCT % 79*   LYMPHS PCT % 11*   MONOS PCT % 8   EOS PCT % 1     Results from last 7 days   Lab Units 08/29/22  1901   POTASSIUM mmol/L 4 2   CHLORIDE mmol/L 101   CO2 mmol/L 32   BUN mg/dL 29*   CREATININE mg/dL 1 56*   CALCIUM mg/dL 8 2*   ALK PHOS U/L 86   ALT U/L 20   AST U/L 11           EKG:  Atrial flutter HR 65    Imaging: I have personally reviewed pertinent reports  and I have personally reviewed pertinent films in PACS     CXR:  Personally reviewed, pulmonary vascular congestion with apparent increased size of right pleural effusion    XR chest 1 view portable    Result Date: 8/16/2022  Narrative: CHEST INDICATION:   sepsis  COMPARISON:  6/9/2022; CT from 5/2/2022; x-ray from 4/21/2022 EXAM PERFORMED/VIEWS:  XR CHEST PORTABLE FINDINGS: Cardiomediastinal silhouette appears unremarkable  There is evidence of right pleural effusion and some volume loss in the right hemithorax  Bandlike atelectasis is noted in the right upper lobe  Right base opacity is similar to the prior examination  No pneumothorax  The left lung appears clear  No  pneumothorax or pleural effusion  Osseous structures appear within normal limits for patient age  Impression: Pleural-parenchymal density at the right lung base has mildly worsened as compared to the studies of June and April  Increased effusion and/or new pneumonia could be present superimposed on more chronic changes noted previously    Follow-up is advised There is continued volume loss in the right hemithorax  The left lung appears clear  The study was marked in [facility] for immediate notification  Workstation performed: [id number]     XR chest 2 views    Result Date: 8/22/2022  Narrative: CHEST INDICATION:   chest pain; hx COPD  COMPARISON:  08/15/2022, 06/09/2022 EXAM PERFORMED/VIEWS:  XR CHEST PA & LATERAL  The frontal view was performed utilizing dual energy radiographic technique  Images: 4 FINDINGS: The heart is of normal size  There is shift of the heart and mediastinal structures to the right related to prior right lung surgery  Scarring in the right upper lobe  Right pleural effusion  The left lung is hyperinflated  There is no pneumothorax  Osseous structures appear within normal limits for patient age  Impression: Stable appearance of the right lung with volume loss, right upper lobe scarring and right pleural effusion  Workstation performed: [id number]     CT head without contrast    Result Date: 8/16/2022  Narrative: CT BRAIN - WITHOUT CONTRAST INDICATION:   Mental status change, unknown cause fall unwitnessed altered  COMPARISON:  CT of the head on March 29, 2022  TECHNIQUE:  CT examination of the brain was performed  In addition to axial images, sagittal and coronal 2D reformatted images were created and submitted for interpretation  Radiation dose length product (DLP) for this visit:  896 99 mGy-cm   This examination, like all CT scans performed in the [facility], was performed utilizing techniques to minimize radiation dose exposure, including the use of iterative  reconstruction and automated exposure control  IMAGE QUALITY:  Diagnostic  FINDINGS: PARENCHYMA: Decreased attenuation is noted in periventricular and subcortical white matter demonstrating an appearance that is statistically most likely to represent mild microangiopathic change  No CT signs of acute infarction  No intracranial mass, mass effect or midline shift    No acute parenchymal hemorrhage  VENTRICLES AND EXTRA-AXIAL SPACES:  Normal for the patient's age  VISUALIZED ORBITS AND PARANASAL SINUSES:  Unremarkable  CALVARIUM AND EXTRACRANIAL SOFT TISSUES:  Normal      Impression: No acute intracranial hemorrhage, midline shift, or mass effect  Workstation performed: [id number]     CT abdomen pelvis w contrast    Result Date: 8/16/2022  Narrative: CT ABDOMEN AND PELVIS WITH IV CONTRAST INDICATION:   Abdominal pain, acute, nonlocalized Abdominal pain, generalized  COMPARISON:  7/5/2022  TECHNIQUE:  CT examination of the abdomen and pelvis was performed  Axial, sagittal, and coronal 2D reformatted images were created from the source data and submitted for interpretation  Radiation dose length product (DLP) for this visit:  1017 17 mGy-cm   This examination, like all CT scans performed in the [facility], was performed utilizing techniques to minimize radiation dose exposure, including the use of iterative reconstruction and automated exposure control  IV Contrast:  85 mL of iohexol (OMNIPAQUE) Enteric Contrast:  Enteric contrast was not administered  FINDINGS: ABDOMEN LOWER CHEST:  Unchanged right lower lobe volume loss  Unchanged right pleural effusion  Unchanged right basilar rounded atelectasis  LIVER/BILIARY TREE:  Unremarkable  GALLBLADDER:  No calcified gallstones  No pericholecystic inflammatory change  SPLEEN:  Unremarkable  PANCREAS:  Unremarkable  ADRENAL GLANDS:  Unremarkable  KIDNEYS/URETERS:  Unremarkable  No hydronephrosis  STOMACH AND BOWEL: Unchanged periampullary duodenal diverticulum  There is colonic diverticulosis without evidence of acute diverticulitis  Focal left upper lobe small bowel ileus  No evidence for small bowel obstruction  No transition point  The remainder of the visualized bowel is unremarkable  APPENDIX:  No findings to suggest appendicitis  ABDOMINOPELVIC CAVITY:  No ascites  No pneumoperitoneum  No lymphadenopathy   VESSELS: Unremarkable for patient's age  PELVIS REPRODUCTIVE ORGANS: Prostatic brachytherapy  URINARY BLADDER:  Decompressed by Cook catheter whose tip and balloon are seen within the bladder  Small volume of air within the bladder likely from catheter insertion  ABDOMINAL WALL/INGUINAL REGIONS:  There is a small fat-containing umbilical hernia  OSSEOUS STRUCTURES:  No acute fracture or destructive osseous lesion  Impression: Focal left upper lobe small bowel ileus  No evidence for small bowel obstruction  No transition point  The remainder of the visualized bowel is unremarkable  Unchanged right lower lobe findings as above  Workstation performed: [id number]     US bedside procedure    Result Date: 8/21/2022  Narrative: 1 [id number]  2 [id number]        ** Please Note: Dragon 360 Dictation voice to text software was used in the creation of this document   **

## 2022-08-30 NOTE — ASSESSMENT & PLAN NOTE
· Continue home antihypertensives  · Will decrease losartan from 50-25 mg to allow more room for diuresis, monitor creatinine closely

## 2022-08-30 NOTE — ASSESSMENT & PLAN NOTE
· CXR appears with increased size of right pleural effusion, has required thoracentesis previously  · Will attempt trial of diuresis 1st, however if patient with persistent/worsening shortness of breath will need to consider IR thoracentesis

## 2022-08-30 NOTE — ASSESSMENT & PLAN NOTE
Wt Readings from Last 3 Encounters:   08/30/22 101 kg (222 lb 10 6 oz)   08/25/22 103 kg (228 lb)   08/21/22 99 8 kg (220 lb)     · Patient noting increasing shortness of breath x3 days along with orthopnea and leg swelling  CXR with evidence of pulmonary vascular congestion/increased size of right pleural effusion  · Patient responded well to 40 mg IV Lasix yesterday, will continue on 40 mg IV Lasix b i d  And monitor urine output    Trend creatinine  · Decreased dose of losartan from 50-25 mg, if creatinine elevates will hold losartan  · Echocardiogram 05/2022 EF 79%, normal diastolic function  · Monitor daily weights, I/O, volume status  · Low-sodium, fluid-restricted diet  · Monitor electrolytes, creatinine while on IV diuresis

## 2022-08-30 NOTE — ASSESSMENT & PLAN NOTE
Lab Results   Component Value Date    EGFR 43 08/29/2022    EGFR 39 08/21/2022    EGFR 39 08/21/2022    CREATININE 1 56 (H) 08/29/2022    CREATININE 1 67 (H) 08/21/2022    CREATININE 1 69 (H) 08/21/2022   · Creatinine at baseline, monitor with BMP

## 2022-08-30 NOTE — ASSESSMENT & PLAN NOTE
· Patient stating he had run out of several medications at home, unable to refill as apparently orders were cancelled   · Cm consult

## 2022-08-30 NOTE — PROGRESS NOTES
[address] [facility]  Progress Note - Devonte Mcdonald Sr  1945, 78 y o  male MRN: [id number]  Unit/Bed#: -01 Encounter: [id number]  Primary Care Provider: Agus Cowan MD   Date and time admitted to hospital: 8/29/2022  5:53 PM    Frequent hospital admissions  Assessment & Plan  · Cm consult    Medication management  Assessment & Plan  · Patient stating he had run out of several medications at home, unable to refill as apparently orders were cancelled   · Cm consult    CKD (chronic kidney disease) stage 3, GFR 30-59 ml/min [facility])  Assessment & Plan  Lab Results   Component Value Date    EGFR 48 08/30/2022    EGFR 43 08/29/2022    EGFR 39 08/21/2022    CREATININE 1 42 (H) 08/30/2022    CREATININE 1 56 (H) 08/29/2022    CREATININE 1 67 (H) 08/21/2022   · Creatinine at baseline, monitor with BMP  · Creatinine improving with diuresis, will continue  · Decrease losartan from 50-25 mg    Chronic respiratory failure with hypoxia ([address] )  Assessment & Plan  · On 3L NC continuously and at baseline oxygen requirement    Patient reports he usually uses oxygen on exertion only  · Patient currently on room air saturating over 95% without any shortness of breath  · Continue supplemental oxygen as needed to maintain SaO2 greater than 88%  · Incentive spirometry, pulmonary toileting    Essential hypertension  Assessment & Plan  · Continue home antihypertensives  · Will decrease losartan from 50-25 mg to allow more room for diuresis, monitor creatinine closely    Pleural effusion on right  Assessment & Plan  · CXR appears with increased size of right pleural effusion, has required thoracentesis previously  · Patient currently comfortable on room air, will continue IV Lasix 40 mg b i d   · Continue inhalers for COPD history    COPD, severe (HCC)  Assessment & Plan  · Not in acute exacerbation, continue home inhalers    Type 2 diabetes mellitus with hyperglycemia, with long-term current use of insulin [facility])  Assessment & Plan  Lab Results   Component Value Date    HGBA1C 10 3 (H) 07/06/2022       Recent Labs     08/29/22  2334 08/30/22  0644 08/30/22  1119   POCGLU 331* 234* 231*       Blood Sugar Average: Last 72 hrs:  · (P) 216 [id number] not controlled and with hyperglycemia on presentation, patient admitting to missed doses of medications  · Resume Humalog mix 45 units t i d  with meals, add correctional insulin with Accu-Cheks while inpatient  · Carb controlled diet  · Initiate hypoglycemia protocol    * Acute on chronic diastolic (congestive) heart failure  Assessment & Plan  Wt Readings from Last 3 Encounters:   08/30/22 101 kg (222 lb 10 6 oz)   08/25/22 103 kg (228 lb)   08/21/22 99 8 kg (220 lb)     · Patient noting increasing shortness of breath x3 days along with orthopnea and leg swelling  CXR with evidence of pulmonary vascular congestion/increased size of right pleural effusion  · Patient responded well to 40 mg IV Lasix yesterday, will continue on 40 mg IV Lasix b i d  And monitor urine output  Trend creatinine  · Decreased dose of losartan from 50-25 mg, if creatinine elevates will hold losartan  · Echocardiogram 05/2022 EF 26%, normal diastolic function  · Monitor daily weights, I/O, volume status  · Low-sodium, fluid-restricted diet  · Monitor electrolytes, creatinine while on IV diuresis              VTE Pharmacologic Prophylaxis: VTE Score: 4 Moderate Risk (Score 3-4) - Pharmacological DVT Prophylaxis Ordered: heparin  Patient Centered Rounds: I performed bedside rounds with nursing staff today  Discussions with Specialists or Other Care Team Provider: n/a    Education and Discussions with Family / Patient: Attempted to update  (son) via phone  Left voicemail  Time Spent for Care: 30 minutes  More than 50% of total time spent on counseling and coordination of care as described above      Current Length of Stay: 1 day(s)  Current Patient Status: Inpatient Certification Statement: The patient will continue to require additional inpatient hospital stay due to IV diuretics  Discharge Plan: Anticipate discharge in 48 hrs to discharge location to be determined pending rehab evaluations  Code Status: Level 1 - Full Code    Subjective:   Patient denies any acute complaints    Objective:     Vitals:   Temp (24hrs), Av 5 °F (36 9 °C), Min:98 2 °F (36 8 °C), Max:98 7 °F (37 1 °C)    Temp:  [98 2 °F (36 8 °C)-98 7 °F (37 1 °C)] 98 5 °F (36 9 °C)  HR:  [65-84] 71  Resp:  [15-22] 15  BP: (120-195)/(65-92) 143/65  SpO2:  [90 %-100 %] 99 %  Body mass index is 30 2 kg/m²  Input and Output Summary (last 24 hours): Intake/Output Summary (Last 24 hours) at 2022 1529  Last data filed at 2022 1300  Gross per 24 hour   Intake 840 ml   Output 1225 ml   Net -385 ml       Physical Exam:   Physical Exam  Vitals and nursing note reviewed  Constitutional:       General: He is not in acute distress  Appearance: He is well-developed  He is not toxic-appearing or diaphoretic  Comments: Disheveled appearance   HENT:      Head: Normocephalic and atraumatic  Eyes:      General: No scleral icterus  Conjunctiva/sclera: Conjunctivae normal    Cardiovascular:      Rate and Rhythm: Normal rate and regular rhythm  Heart sounds: No murmur heard  No friction rub  No gallop  Pulmonary:      Effort: Pulmonary effort is normal  No respiratory distress  Breath sounds: Normal breath sounds  No stridor  No wheezing, rhonchi or rales  Chest:      Chest wall: No tenderness  Abdominal:      General: There is no distension  Palpations: Abdomen is soft  There is no mass  Tenderness: There is no abdominal tenderness  There is no guarding or rebound  Hernia: No hernia is present  Musculoskeletal:         General: No swelling, tenderness, deformity or signs of injury  Cervical back: Neck supple  Right lower leg: Edema present  Left lower leg: Edema present  Comments: Chronic venous stasis changes with lower extremity wounds, does not appear infected, low concern for any cellulitic process   Skin:     General: Skin is warm and dry  Neurological:      Mental Status: He is alert and oriented to person, place, and time  Additional Data:     Labs:  Results from last 7 days   Lab Units 08/30/22  0500 08/29/22  1901   WBC Thousand/uL 6 84 7 77   HEMOGLOBIN g/dL 11 0* 12 1   HEMATOCRIT % 34 3* 37 5   PLATELETS Thousands/uL 108* 122*   NEUTROS PCT %  --  79*   LYMPHS PCT %  --  11*   MONOS PCT %  --  8   EOS PCT %  --  1     Results from last 7 days   Lab Units 08/30/22  0500 08/29/22  1901   SODIUM mmol/L 141 138   POTASSIUM mmol/L 4 1 4 2   CHLORIDE mmol/L 103 101   CO2 mmol/L 33* 32   BUN mg/dL 27* 29*   CREATININE mg/dL 1 42* 1 56*   ANION GAP mmol/L 5 5   CALCIUM mg/dL 8 4 8 2*   ALBUMIN g/dL  --  2 6*   TOTAL BILIRUBIN mg/dL  --  0 20   ALK PHOS U/L  --  86   ALT U/L  --  20   AST U/L  --  11   GLUCOSE RANDOM mg/dL 272* 312*     Results from last 7 days   Lab Units 08/30/22  0500   INR  1 01     Results from last 7 days   Lab Units 08/30/22  1119 08/30/22  0644 08/29/22  2334   POC GLUCOSE mg/dl 231* 234* 331*               Lines/Drains:  Invasive Devices  Report    Peripheral Intravenous Line  Duration           Peripheral IV 08/29/22 Right Forearm <1 day                      Imaging: No pertinent imaging reviewed      Recent Cultures (last 7 days):         Last 24 Hours Medication List:   Current Facility-Administered Medications   Medication Dose Route Frequency Provider Last Rate    aspirin  81 mg Oral Daily Dashawn Tompkins DO      carvedilol  6 25 mg Oral BID With Meals Dashawn Tompkins DO      cyclobenzaprine  10 mg Oral BID Dashawn Tompkins DO      ferrous sulfate  325 mg Oral Daily With Breakfast Dashawn Tompkins DO      fluticasone-vilanterol  1 puff Inhalation Daily Dashawn Tompkins DO      furosemide  40 mg Intravenous BID Jemma Oyster, DO      gabapentin  100 mg Oral TID Jemma Oyster, DO      heparin (porcine)  5,000 Units Subcutaneous Counts include [facility] beds [facility] the [facility] Jemma Oyster, DO      insulin aspart protamine-insulin aspart  45 Units Subcutaneous TID AC Jemma Oyster, DO      insulin lispro  2-12 Units Subcutaneous HS Jemma Oyster, DO      insulin lispro  4-20 Units Subcutaneous TID AC Jemma Oyster, DO      [START ON 8/31/2022] losartan  25 mg Oral Daily Deyvi Rosario,       pravastatin  80 mg Oral Daily With [employer], DO      tamsulosin  0 4 mg Oral Daily With [employer], DO      umeclidinium bromide  1 puff Inhalation Daily Jemma Oyster, DO          Today, Patient Was Seen By: Boston Glover DO    **Please Note: This note may have been constructed using a voice recognition system  **

## 2022-08-30 NOTE — WOUND OSTOMY CARE
Consult Note - Wound   Brian Hollow Sr  78 y o  male MRN: [id number]  Unit/Bed#: -01 Encounter: [id number]        History and Present Illness:  Patient is 77 yo male admitted to [facility] with CHF  Patient has history of venous stasis with ulcers  Patient is continent of bowel and bladder and independent with ADLs  Assessment Findings:       1  Left leg wound- small partial thickness wound likely due to venous stasis and edema, yellow/white tissue, and scant drainage  Placed Allevyn foam for treatment  Orders listed below and wound care will sign off, call or tiger text with questions  Bedside nurse updated of findings and orders  Flowsheets below with pictures and measurements  Skin care plans:  1-Hydraguard to bilateral sacrum, buttock and heels BID and PRN  2-Elevate heels to offload pressure  3-Ehob cushion in chair when out of bed  4-Moisturize skin daily with skin nourishing cream   5-Turn/reposition q2h or when medically stable for pressure re-distribution on skin  6-Cleanse left leg with soap and water  Apply Allevyn foam, bobby T for treatment, and change every 3 days and PRN soilage/displacement    Wounds:  Wound 06/11/22 Skin tear Arm Left; Lower; Posterior (Active)       Wound 08/30/22 Pretibial Left (Active)   Wound Image   08/30/22 1411   Wound Description Beefy red 08/30/22 1411   Karina-wound Assessment Yellow-brown 08/30/22 1411   Wound Length (cm) 0 5 cm 08/30/22 1411   Wound Width (cm) 0 5 cm 08/30/22 1411   Wound Depth (cm) 0 1 cm 08/30/22 1411   Wound Surface Area (cm^2) 0 25 cm^2 08/30/22 1411   Wound Volume (cm^3) 0 025 cm^3 08/30/22 1411   Calculated Wound Volume (cm^3) 0 03 cm^3 08/30/22 1411   Tunneling 0 cm 08/30/22 1411   Tunneling in depth located at 0 08/30/22 1411   Undermining 0 08/30/22 1411   Undermining is depth extending from 0 08/30/22 1411   Wound Site Closure ANDREAS 08/30/22 1411   Drainage Amount Scant 08/30/22 1411   Drainage Description Sanguineous 08/30/22 1411 Non-staged Wound Description Partial thickness 08/30/22 1411   Treatments Cleansed 08/30/22 1411   Dressing Foam, Silicon (eg  Allevyn, etc) 08/30/22 1411   Wound packed? No 08/30/22 1411   Packing- # removed 0 08/30/22 1411   Packing- # inserted 0 08/30/22 1411   Dressing Changed New 08/30/22 1411   Patient Tolerance Tolerated well 08/30/22 1411   Dressing Status Clean;Dry; Intact 08/30/22 1411           Shanthi PINEDAN, RN, [facility]

## 2022-08-30 NOTE — DISCHARGE INSTR - OTHER ORDERS
Skin care plans:  1-Hydraguard to bilateral sacrum, buttock and heels BID and PRN  2-Elevate heels to offload pressure  3-Ehob cushion in chair when out of bed  4-Moisturize skin daily with skin nourishing cream   5-Turn/reposition q2h or when medically stable for pressure re-distribution on skin  6-Cleanse left leg with soap and water   Apply Allevyn foam, bobby T for treatment, and change every 3 days and PRN soilage/displacement

## 2022-08-30 NOTE — ASSESSMENT & PLAN NOTE
Lab Results   Component Value Date    HGBA1C 10 3 (H) 07/06/2022       Recent Labs     08/29/22  2334 08/30/22  0644 08/30/22  1119   POCGLU 331* 234* 231*       Blood Sugar Average: Last 72 hrs:  · (P) 605 [id number] not controlled and with hyperglycemia on presentation, patient admitting to missed doses of medications  · Resume Humalog mix 45 units t i d  with meals, add correctional insulin with Accu-Cheks while inpatient  · Carb controlled diet  · Initiate hypoglycemia protocol

## 2022-08-30 NOTE — UTILIZATION REVIEW
Initial Clinical Review    Admission: Date/Time/Statement:   Admission Orders (From admission, onward)     Ordered        08/29/22 2150  INPATIENT ADMISSION  Once                      Orders Placed This Encounter   Procedures    INPATIENT ADMISSION     Standing Status:   Standing     Number of Occurrences:   1     Order Specific Question:   Level of Care     Answer:   Med Surg [16]     Order Specific Question:   Estimated length of stay     Answer:   More than 2 Midnights     Order Specific Question:   Certification     Answer:   I certify that inpatient services are medically necessary for this patient for a duration of greater than two midnights  See H&P and MD Progress Notes for additional information about the patient's course of treatment  ED Arrival Information     Expected   -    Arrival   8/29/2022 17:53    Acuity   Urgent            Means of arrival   Ambulance    Escorted by   [facility]    Service   Hospitalist    Admission type   Urgent            Arrival complaint   -           Chief Complaint   Patient presents with    Shortness of Breath     Chest pain       Initial Presentation: 78 y o  male with PMHx of CHF, COPD on chronic O2 3L nc, h/o lung cancer s/p resection and radiation, recurrent R pleural effusion, uncontrolled T2 DM on insulin, CKD  presents to ED by EMS with increasing shortness of breath x3 days along with orthopnea and leg swelling  Pt states he ran out of many of his meds as he attempted to refill and was unable  In ED Plts 29 Cr 1 56, Ca 8 2  CXR with increased size of right pleural effusion, has required thoracentesis previously  Lasix IV x1 given in ED  ADMIT INPATIENT to M/S/TELE UNIT with ACUTE on CHRONIC dHF, R pleural effusion  Continue IV Lasix BID, low sodium diet, fluid restriction  Monitor electrolytes, replace as needed  May need to consider IR thoracentesis  Continue home po meds   HgbA1c 10 3, resume Humalog mix 45 units t i d  with meals, add correctional insulin with Acu-Checks  Carb controlled diet    Date: 8/30   Day 2:  Pt denies any complaints today  He responded well to 40 mg IV Lasix yesterday, continue on 40 mg IV Lasix b i d  and monitor I/Os  Trend creatinine  Decreased dose of losartan from 50-25 mg, if creatinine elevates will hold losartan  Remains on room air  Wound care consulted for LLE wound, skin care orders for nursing care  Cardiac diet  SCD's  OOB  Supportive care  ED Triage Vitals [08/29/22 1807]   Temperature Pulse Respirations Blood Pressure SpO2   98 7 °F (37 1 °C) 65 22 (!) 195/92 96 %      Temp Source Heart Rate Source Patient Position - Orthostatic VS BP Location FiO2 (%)   Oral Monitor Sitting Right arm --      Pain Score       4          Wt Readings from Last 1 Encounters:   08/30/22 101 kg (223 lb 9 6 oz)     Additional Vital Signs:   Date/Time Temp Pulse Resp BP MAP (mmHg) SpO2 O2 Flow Rate (L/min) O2 Device   08/30/22 07:17:06 98 5 °F (36 9 °C) 71 15 143/65 91 99 % -- --   08/30/22 0500 -- -- -- -- -- 98 % -- --   08/29/22 2347 -- -- -- -- -- -- 2 L/min Nasal cannula   08/29/22 2321 -- -- -- -- -- -- -- Nasal cannula   08/29/22 23:08:37 98 2 °F (36 8 °C) 83 20 120/87 98 90 % -- None (Room air)   08/29/22 2200 -- 84 -- 168/77 111 94 % -- None (Room air)   08/29/22 2100 -- 80 20 144/66 95 94 % -- None (Room air)   08/29/22 1900 -- 76 20 170/79 113 96 % -- None (Room air)   08/29/22 1815 -- 72 20 159/70 101 100 % -- None (Room air)   08/29/22 1807 98 7 °F (37 1 °C) 65 22 195/92 Abnormal  -- 96 % -- None (Room air)       Pertinent Labs/Diagnostic Test Results:   XR chest 1 view portable   Final Result by Savannah Phillips MD (08/30 0151)   Persistent right lung volume loss after surgery with right basilar pleural effusion  No acute pulmonary findings              Results from last 7 days   Lab Units 08/30/22  0500 08/29/22  1901   WBC Thousand/uL 6 84 7 77   HEMOGLOBIN g/dL 11 0* 12 1   HEMATOCRIT % 34 3* 37 5   PLATELETS Thousands/uL 108* 122*   NEUTROS ABS Thousands/µL  --  6 17     Results from last 7 days   Lab Units 08/30/22  0500 08/29/22  1901   SODIUM mmol/L 141 138   POTASSIUM mmol/L 4 1 4 2   CHLORIDE mmol/L 103 101   CO2 mmol/L 33* 32   ANION GAP mmol/L 5 5   BUN mg/dL 27* 29*   CREATININE mg/dL 1 42* 1 56*   EGFR ml/min/1 73sq m 48 43   CALCIUM mg/dL 8 4 8 2*   MAGNESIUM mg/dL 2 1  --      Results from last 7 days   Lab Units 08/29/22  1901   AST U/L 11   ALT U/L 20   ALK PHOS U/L 86   TOTAL PROTEIN g/dL 7 1   ALBUMIN g/dL 2 6*   TOTAL BILIRUBIN mg/dL 0 20     Results from last 7 days   Lab Units 08/30/22  0644 08/29/22  2334   POC GLUCOSE mg/dl 234* 331*     Results from last 7 days   Lab Units 08/30/22  0500 08/29/22  1901   GLUCOSE RANDOM mg/dL 272* 312*     BETA-HYDROXYBUTYRATE   Date Value Ref Range Status   06/15/2019 0 2 <0 6 mmol/L Final      Results from last 7 days   Lab Units 08/29/22  2144 08/29/22  1901   HS TNI 0HR ng/L  --  16   HS TNI 2HR ng/L 18  --    HSTNI D2 ng/L 2  --      Results from last 7 days   Lab Units 08/30/22  0500   PROTIME seconds 13 5   INR  1 01     Results from last 7 days   Lab Units 08/29/22  1901   NT-PRO BNP pg/mL 2,510*       ED Treatment:   Medication Administration from 08/29/2022 1753 to 08/29/2022 2304       Date/Time Order Dose Route Action     08/29/2022 1910 furosemide (LASIX) injection 40 mg 40 mg Intravenous Given     08/29/2022 1910 nitroglycerin (NITRO-BID) 2 % TD ointment 1 inch 1 inch Topical Given     Past Medical History:   Diagnosis Date    Abdominal pain     [facility]ANDA ([facility] kidney injury) ([facility] ) 6/19/2018    Cardiac disease     [facility]     COPD, severe ([facility] )     Coronary artery disease     DDD (degenerative disc disease), lumbar 9/1/2020    Diabetes mellitus ([address] )     Dyspnea     GERD (gastroesophageal reflux disease)     Hyperlipidemia     Hypertension     [facility] infarction) [facility] )     with 3 stents    Nodule of apex of right lung     JACQUELINE (obstructive sleep apnea)     Prostate cancer (HCC)      Present on Admission:   Chronic respiratory failure with hypoxia (HCC)   Acute on chronic diastolic (congestive) heart failure   COPD, severe (HCC)   CKD (chronic kidney disease) stage 3, GFR 30-59 ml/min (HCC)   Essential hypertension   Pleural effusion on right   Frequent hospital admissions      Admitting Diagnosis: SOB (shortness of breath) [R06 02]  Acute on chronic congestive heart failure, unspecified heart failure type ([facility] ) [I50 9]  Age/Sex: 78 y o  male  Admission Orders:  Scheduled Medications:  aspirin, 81 mg, Oral, Daily  carvedilol, 6 25 mg, Oral, BID With Meals  cyclobenzaprine, 10 mg, Oral, BID  ferrous sulfate, 325 mg, Oral, Daily With Breakfast  fluticasone-vilanterol, 1 puff, Inhalation, Daily  furosemide, 40 mg, Intravenous, BID  gabapentin, 100 mg, Oral, TID  heparin (porcine), 5,000 Units, Subcutaneous, Q8H [address]  insulin aspart protamine-insulin aspart, 45 Units, Subcutaneous, TID AC  insulin lispro, 2-12 Units, Subcutaneous, HS  insulin lispro, 4-20 Units, Subcutaneous, TID AC  [START ON 8/31/2022] losartan, 25 mg, Oral, Daily  pravastatin, 80 mg, Oral, Daily With Dinner  tamsulosin, 0 4 mg, Oral, Daily With Dinner  umeclidinium bromide, 1 puff, Inhalation, Daily        Network Utilization Review Department  ATTENTION: Please call with any questions or concerns to [phone] and carefully listen to the prompts so that you are directed to the right person  All voicemails are confidential   [employer] all requests for admission clinical reviews, approved or denied determinations and any other requests to dedicated fax number below belonging to the campus where the patient is receiving treatment   List of dedicated fax numbers for the Facilities:  [address] DENIALS (Administrative/Medical Necessity) [phone]   [address] (Maternity/NICU/Pediatrics) [phone]   Mariaelena Mendoza 907 [facility] 40 125 [facility]  [phone]   [facility] [address] [id number] [place] [address] [address] [place] [address] [phone]

## 2022-08-30 NOTE — ASSESSMENT & PLAN NOTE
· On 3L NC continuously and at baseline oxygen requirement    Patient reports he usually uses oxygen on exertion only  · Patient currently on room air saturating over 95% without any shortness of breath  · Continue supplemental oxygen as needed to maintain SaO2 greater than 88%  · Incentive spirometry, pulmonary toileting

## 2022-08-30 NOTE — ASSESSMENT & PLAN NOTE
Lab Results   Component Value Date    EGFR 48 08/30/2022    EGFR 43 08/29/2022    EGFR 39 08/21/2022    CREATININE 1 42 (H) 08/30/2022    CREATININE 1 56 (H) 08/29/2022    CREATININE 1 67 (H) 08/21/2022   · Creatinine at baseline, monitor with BMP  · Creatinine improving with diuresis, will continue  · Decrease losartan from 50-25 mg

## 2022-08-30 NOTE — PLAN OF CARE
Problem: MOBILITY - ADULT  Goal: Maintain or return to baseline ADL function  Description: INTERVENTIONS:  -  Assess patient's ability to carry out ADLs; assess patient's baseline for ADL function and identify physical deficits which impact ability to perform ADLs (bathing, care of mouth/teeth, toileting, grooming, dressing, etc )  - Assess/evaluate cause of self-care deficits   - Assess range of motion  - Assess patient's mobility; develop plan if impaired  - Assess patient's need for assistive devices and provide as appropriate  - Encourage maximum independence but intervene and supervise when necessary  - Involve family in performance of ADLs  - Assess for home care needs following discharge   - Consider OT consult to assist with ADL evaluation and planning for discharge  - Provide patient education as appropriate  Outcome: Progressing  Goal: Maintains/Returns to pre admission functional level  Description: INTERVENTIONS:  - Perform BMAT or MOVE assessment daily    - Set and communicate daily mobility goal to care team and patient/family/caregiver  - Collaborate with rehabilitation services on mobility goals if consulted  - Perform Range of Motion  times a day  - Reposition patient every  hours    - Dangle patient  times a day  - Stand patient  times a day  - Ambulate patient  times a day  - Out of bed to chair  times a day   - Out of bed for meals  times a day  - Out of bed for toileting  - Record patient progress and toleration of activity level   Outcome: Progressing     Problem: Potential for Falls  Goal: Patient will remain free of falls  Description: INTERVENTIONS:  - Educate patient/family on patient safety including physical limitations  - Instruct patient to call for assistance with activity   - Consult OT/PT to assist with strengthening/mobility   - Keep Call bell within reach  - Keep bed low and locked with side rails adjusted as appropriate  - Keep care items and personal belongings within reach  - Initiate and maintain comfort rounds  - Make Fall Risk Sign visible to staff  - Offer Toileting every  Hours, in advance of need  - Initiate/Maintain alarm  - Obtain necessary fall risk management equipment:   - Apply yellow socks and bracelet for high fall risk patients  - Consider moving patient to room near nurses station  Outcome: Progressing     Problem: SAFETY ADULT  Goal: Maintain or return to baseline ADL function  Description: INTERVENTIONS:  -  Assess patient's ability to carry out ADLs; assess patient's baseline for ADL function and identify physical deficits which impact ability to perform ADLs (bathing, care of mouth/teeth, toileting, grooming, dressing, etc )  - Assess/evaluate cause of self-care deficits   - Assess range of motion  - Assess patient's mobility; develop plan if impaired  - Assess patient's need for assistive devices and provide as appropriate  - Encourage maximum independence but intervene and supervise when necessary  - Involve family in performance of ADLs  - Assess for home care needs following discharge   - Consider OT consult to assist with ADL evaluation and planning for discharge  - Provide patient education as appropriate  Outcome: Progressing  Goal: Maintains/Returns to pre admission functional level  Description: INTERVENTIONS:  - Perform BMAT or MOVE assessment daily    - Set and communicate daily mobility goal to care team and patient/family/caregiver  - Collaborate with rehabilitation services on mobility goals if consulted  - Perform Range of Motion  times a day  - Reposition patient every  hours    - Dangle patient  times a day  - Stand patient  times a day  - Ambulate patient  times a day  - Out of bed to chair  times a day   - Out of bed for meals  times a day  - Out of bed for toileting  - Record patient progress and toleration of activity level   Outcome: Progressing  Goal: Patient will remain free of falls  Description: INTERVENTIONS:  - Educate patient/family on patient safety including physical limitations  - Instruct patient to call for assistance with activity   - Consult OT/PT to assist with strengthening/mobility   - Keep Call bell within reach  - Keep bed low and locked with side rails adjusted as appropriate  - Keep care items and personal belongings within reach  - Initiate and maintain comfort rounds  - Make Fall Risk Sign visible to staff  - Offer Toileting every  Hours, in advance of need  - Initiate/Maintain alarm  - Obtain necessary fall risk management equipment  - Apply yellow socks and bracelet for high fall risk patients  - Consider moving patient to room near nurses station  Outcome: Progressing     Problem: Knowledge Deficit  Goal: Patient/family/caregiver demonstrates understanding of disease process, treatment plan, medications, and discharge instructions  Description: Complete learning assessment and assess knowledge base    Interventions:  - Provide teaching at level of understanding  - Provide teaching via preferred learning methods  Outcome: Progressing     Problem: CARDIOVASCULAR - ADULT  Goal: Maintains optimal cardiac output and hemodynamic stability  Description: INTERVENTIONS:  - Monitor I/O, vital signs and rhythm  - Monitor for S/S and trends of decreased cardiac output  - Administer and titrate ordered vasoactive medications to optimize hemodynamic stability  - Assess quality of pulses, skin color and temperature  - Assess for signs of decreased coronary artery perfusion  - Instruct patient to report change in severity of symptoms  Outcome: Progressing  Goal: Absence of cardiac dysrhythmias or at baseline rhythm  Description: INTERVENTIONS:  - Continuous cardiac monitoring, vital signs, obtain 12 lead EKG if ordered  - Administer antiarrhythmic and heart rate control medications as ordered  - Monitor electrolytes and administer replacement therapy as ordered  Outcome: Progressing     Problem: RESPIRATORY - ADULT  Goal: Achieves optimal ventilation and oxygenation  Description: INTERVENTIONS:  - Assess for changes in respiratory status  - Assess for changes in mentation and behavior  - Position to facilitate oxygenation and minimize respiratory effort  - Oxygen administered by appropriate delivery if ordered  - Initiate smoking cessation education as indicated  - Encourage broncho-pulmonary hygiene including cough, deep breathe, Incentive Spirometry  - Assess the need for suctioning and aspirate as needed  - Assess and instruct to report SOB or any respiratory difficulty  - Respiratory Therapy support as indicated  Outcome: Progressing     Problem: METABOLIC, FLUID AND ELECTROLYTES - ADULT  Goal: Electrolytes maintained within normal limits  Description: INTERVENTIONS:  - Monitor labs and assess patient for signs and symptoms of electrolyte imbalances  - Administer electrolyte replacement as ordered  - Monitor response to electrolyte replacements, including repeat lab results as appropriate  - Instruct patient on fluid and nutrition as appropriate  Outcome: Progressing  Goal: Fluid balance maintained  Description: INTERVENTIONS:  - Monitor labs   - Monitor I/O and WT  - Instruct patient on fluid and nutrition as appropriate  - Assess for signs & symptoms of volume excess or deficit  Outcome: Progressing  Goal: Glucose maintained within target range  Description: INTERVENTIONS:  - Monitor Blood Glucose as ordered  - Assess for signs and symptoms of hyperglycemia and hypoglycemia  - Administer ordered medications to maintain glucose within target range  - Assess nutritional intake and initiate nutrition service referral as needed  Outcome: Progressing

## 2022-08-31 LAB
ANION GAP SERPL CALCULATED.3IONS-SCNC: 2 MMOL/L (ref 4–13)
BASOPHILS # BLD AUTO: 0.03 THOUSANDS/ΜL (ref 0–0.1)
BASOPHILS NFR BLD AUTO: 0 % (ref 0–1)
BUN SERPL-MCNC: 34 MG/DL (ref 5–25)
CALCIUM SERPL-MCNC: 8.4 MG/DL (ref 8.3–10.1)
CHLORIDE SERPL-SCNC: 100 MMOL/L (ref 96–108)
CO2 SERPL-SCNC: 35 MMOL/L (ref 21–32)
CREAT SERPL-MCNC: 1.6 MG/DL (ref 0.6–1.3)
EOSINOPHIL # BLD AUTO: 0.18 THOUSAND/ΜL (ref 0–0.61)
EOSINOPHIL NFR BLD AUTO: 3 % (ref 0–6)
ERYTHROCYTE [DISTWIDTH] IN BLOOD BY AUTOMATED COUNT: 15.8 % (ref 11.6–15.1)
GFR SERPL CREATININE-BSD FRML MDRD: 41 ML/MIN/1.73SQ M
GLUCOSE SERPL-MCNC: 103 MG/DL (ref 65–140)
GLUCOSE SERPL-MCNC: 125 MG/DL (ref 65–140)
GLUCOSE SERPL-MCNC: 139 MG/DL (ref 65–140)
GLUCOSE SERPL-MCNC: 190 MG/DL (ref 65–140)
GLUCOSE SERPL-MCNC: 328 MG/DL (ref 65–140)
HCT VFR BLD AUTO: 38.6 % (ref 36.5–49.3)
HGB BLD-MCNC: 12.1 G/DL (ref 12–17)
IMM GRANULOCYTES # BLD AUTO: 0.03 THOUSAND/UL (ref 0–0.2)
IMM GRANULOCYTES NFR BLD AUTO: 0 % (ref 0–2)
LYMPHOCYTES # BLD AUTO: 1.28 THOUSANDS/ΜL (ref 0.6–4.47)
LYMPHOCYTES NFR BLD AUTO: 19 % (ref 14–44)
MCH RBC QN AUTO: 26.7 PG (ref 26.8–34.3)
MCHC RBC AUTO-ENTMCNC: 31.3 G/DL (ref 31.4–37.4)
MCV RBC AUTO: 85 FL (ref 82–98)
MONOCYTES # BLD AUTO: 0.57 THOUSAND/ΜL (ref 0.17–1.22)
MONOCYTES NFR BLD AUTO: 9 % (ref 4–12)
NEUTROPHILS # BLD AUTO: 4.61 THOUSANDS/ΜL (ref 1.85–7.62)
NEUTS SEG NFR BLD AUTO: 69 % (ref 43–75)
NRBC BLD AUTO-RTO: 0 /100 WBCS
PLATELET # BLD AUTO: 101 THOUSANDS/UL (ref 149–390)
PMV BLD AUTO: 10.6 FL (ref 8.9–12.7)
POTASSIUM SERPL-SCNC: 4 MMOL/L (ref 3.5–5.3)
RBC # BLD AUTO: 4.53 MILLION/UL (ref 3.88–5.62)
SODIUM SERPL-SCNC: 137 MMOL/L (ref 135–147)
WBC # BLD AUTO: 6.7 THOUSAND/UL (ref 4.31–10.16)

## 2022-08-31 PROCEDURE — 99232 SBSQ HOSP IP/OBS MODERATE 35: CPT | Performed by: INTERNAL MEDICINE

## 2022-08-31 PROCEDURE — 82948 REAGENT STRIP/BLOOD GLUCOSE: CPT

## 2022-08-31 PROCEDURE — 85025 COMPLETE CBC W/AUTO DIFF WBC: CPT | Performed by: INTERNAL MEDICINE

## 2022-08-31 PROCEDURE — 80048 BASIC METABOLIC PNL TOTAL CA: CPT | Performed by: INTERNAL MEDICINE

## 2022-08-31 RX ORDER — FUROSEMIDE 40 MG/1
40 TABLET ORAL
Status: DISCONTINUED | OUTPATIENT
Start: 2022-09-01 | End: 2022-09-02 | Stop reason: HOSPADM

## 2022-08-31 RX ADMIN — GABAPENTIN 100 MG: 100 CAPSULE ORAL at 15:00

## 2022-08-31 RX ADMIN — FERROUS SULFATE TAB 325 MG (65 MG ELEMENTAL FE) 325 MG: 325 (65 FE) TAB at 08:41

## 2022-08-31 RX ADMIN — INSULIN LISPRO 4 UNITS: 100 INJECTION, SOLUTION INTRAVENOUS; SUBCUTANEOUS at 18:37

## 2022-08-31 RX ADMIN — CARVEDILOL 6.25 MG: 6.25 TABLET, FILM COATED ORAL at 08:41

## 2022-08-31 RX ADMIN — HEPARIN SODIUM 5000 UNITS: 5000 INJECTION INTRAVENOUS; SUBCUTANEOUS at 05:56

## 2022-08-31 RX ADMIN — FLUTICASONE FUROATE AND VILANTEROL TRIFENATATE 1 PUFF: 100; 25 POWDER RESPIRATORY (INHALATION) at 08:38

## 2022-08-31 RX ADMIN — LOSARTAN POTASSIUM 25 MG: 25 TABLET, FILM COATED ORAL at 08:38

## 2022-08-31 RX ADMIN — UMECLIDINIUM 1 PUFF: 62.5 AEROSOL, POWDER ORAL at 08:38

## 2022-08-31 RX ADMIN — INSULIN ASPART 45 UNITS: 100 INJECTION, SUSPENSION SUBCUTANEOUS at 18:39

## 2022-08-31 RX ADMIN — ASPIRIN 81 MG: 81 TABLET, COATED ORAL at 08:38

## 2022-08-31 RX ADMIN — CYCLOBENZAPRINE HYDROCHLORIDE 10 MG: 10 TABLET, FILM COATED ORAL at 21:11

## 2022-08-31 RX ADMIN — GABAPENTIN 100 MG: 100 CAPSULE ORAL at 21:11

## 2022-08-31 RX ADMIN — INSULIN ASPART 45 UNITS: 100 INJECTION, SUSPENSION SUBCUTANEOUS at 12:28

## 2022-08-31 RX ADMIN — CYCLOBENZAPRINE HYDROCHLORIDE 10 MG: 10 TABLET, FILM COATED ORAL at 08:38

## 2022-08-31 RX ADMIN — HEPARIN SODIUM 5000 UNITS: 5000 INJECTION INTRAVENOUS; SUBCUTANEOUS at 14:57

## 2022-08-31 RX ADMIN — TAMSULOSIN HYDROCHLORIDE 0.4 MG: 0.4 CAPSULE ORAL at 15:34

## 2022-08-31 RX ADMIN — INSULIN LISPRO 12 UNITS: 100 INJECTION, SOLUTION INTRAVENOUS; SUBCUTANEOUS at 12:29

## 2022-08-31 RX ADMIN — GABAPENTIN 100 MG: 100 CAPSULE ORAL at 08:38

## 2022-08-31 RX ADMIN — CARVEDILOL 6.25 MG: 6.25 TABLET, FILM COATED ORAL at 15:35

## 2022-08-31 RX ADMIN — FUROSEMIDE 40 MG: 10 INJECTION, SOLUTION INTRAMUSCULAR; INTRAVENOUS at 08:38

## 2022-08-31 RX ADMIN — PRAVASTATIN SODIUM 80 MG: 80 TABLET ORAL at 18:39

## 2022-08-31 RX ADMIN — FUROSEMIDE 40 MG: 10 INJECTION, SOLUTION INTRAMUSCULAR; INTRAVENOUS at 18:39

## 2022-08-31 RX ADMIN — HEPARIN SODIUM 5000 UNITS: 5000 INJECTION INTRAVENOUS; SUBCUTANEOUS at 21:11

## 2022-08-31 NOTE — ASSESSMENT & PLAN NOTE
Lab Results   Component Value Date    HGBA1C 10 3 (H) 07/06/2022       Recent Labs     08/30/22  2030 08/31/22  0709 08/31/22  1219 08/31/22  1635   POCGLU 248* 139 328* 190*       Blood Sugar Average: Last 72 hrs:  · (P) 202 not controlled and with hyperglycemia on presentation, patient admitting to missed doses of medications  · Resume Humalog mix 45 units t i d  with meals, add correctional insulin with Accu-Cheks while inpatient  · Carb controlled diet  · Initiate hypoglycemia protocol

## 2022-08-31 NOTE — PLAN OF CARE
Alert and oriented  In bed with call light within reach  Bed in low position  Denies pain at this moment  Blood sugar and Blood pressure monitored  On 2 L of oxygen  On 1500 mL FR  Voiding fine   Will continue to monitor him

## 2022-08-31 NOTE — ASSESSMENT & PLAN NOTE
Lab Results   Component Value Date    EGFR 41 08/31/2022    EGFR 48 08/30/2022    EGFR 43 08/29/2022    CREATININE 1 60 (H) 08/31/2022    CREATININE 1 42 (H) 08/30/2022    CREATININE 1 56 (H) 08/29/2022   · Creatinine at baseline, monitor with BMP  · Hold losartan  · Recheck BMP tomorrow tele trend creatinine

## 2022-08-31 NOTE — PROGRESS NOTES
[address] [facility]  Progress Note - Jah Zambrano Sr  1945, 78 y o  male MRN: [id number]  Unit/Bed#: -01 Encounter: [id number]  Primary Care Provider: Manish Inman MD   Date and time admitted to hospital: 8/29/2022  5:53 PM    Frequent hospital admissions  Assessment & Plan  · Cm consult    Medication management  Assessment & Plan  · Patient stating he had run out of several medications at home, unable to refill as apparently orders were cancelled   · Cm consult    CKD (chronic kidney disease) stage 3, GFR 30-59 ml/min [facility])  Assessment & Plan  Lab Results   Component Value Date    EGFR 41 08/31/2022    EGFR 48 08/30/2022    EGFR 43 08/29/2022    CREATININE 1 60 (H) 08/31/2022    CREATININE 1 42 (H) 08/30/2022    CREATININE 1 56 (H) 08/29/2022   · Creatinine at baseline, monitor with BMP  · Hold losartan  · Recheck BMP tomorrow tele trend creatinine    Chronic respiratory failure with hypoxia ([facility] [address] )  Assessment & Plan  · On 3L NC continuously and at baseline oxygen requirement    Patient reports he usually uses oxygen on exertion only  · Patient currently on room air saturating over 95% without any shortness of breath  · Continue supplemental oxygen as needed to maintain SaO2 greater than 88%  · Incentive spirometry, pulmonary toileting    Essential hypertension  Assessment & Plan  · Continue home antihypertensives  · Hold losartan    Pleural effusion on right  Assessment & Plan  · CXR appears with increased size of right pleural effusion, has required thoracentesis previously  · Patient currently comfortable on room air, transition to oral diuretics tomorrow, will continue IV Lasix for 1 more day today  · Continue inhalers for COPD history    COPD, severe (HCC)  Assessment & Plan  · Not in acute exacerbation, continue home inhalers    Type 2 diabetes mellitus with hyperglycemia, with long-term current use of insulin [facility])  Assessment & Plan  Lab Results   Component Value Date    HGBA1C 10 3 (H) 07/06/2022       Recent Labs     08/30/22  2030 08/31/22  0709 08/31/22  1219 08/31/22  1635   POCGLU 248* 139 328* 190*       Blood Sugar Average: Last 72 hrs:  · (P) 202 not controlled and with hyperglycemia on presentation, patient admitting to missed doses of medications  · Resume Humalog mix 45 units t i d  with meals, add correctional insulin with Accu-Cheks while inpatient  · Carb controlled diet  · Initiate hypoglycemia protocol    * Acute on chronic diastolic (congestive) heart failure  Assessment & Plan  Wt Readings from Last 3 Encounters:   08/31/22 100 kg (220 lb 6 4 oz)   08/25/22 103 kg (228 lb)   08/21/22 99 8 kg (220 lb)     · Patient noting increasing shortness of breath x3 days along with orthopnea and leg swelling  CXR with evidence of pulmonary vascular congestion/increased size of right pleural effusion  · Patient responded well to 40 mg IV Lasix yesterday, will continue on 40 mg IV Lasix b i d  And monitor urine output  Trend creatinine  Plan to transition to oral diuretics tomorrow  · Hold losartan  · Echocardiogram 05/2022 EF 55%, normal diastolic function  · Monitor daily weights, I/O, volume status  · Low-sodium, fluid-restricted diet  · Monitor electrolytes, creatinine while on IV diuresis            VTE Pharmacologic Prophylaxis: VTE Score: 4 Moderate Risk (Score 3-4) - Pharmacological DVT Prophylaxis Ordered: heparin  Patient Centered Rounds: I performed bedside rounds with nursing staff today  Discussions with Specialists or Other Care Team Provider: n/a    Education and Discussions with Family / Patient: Attempted to update  (son) via phone  Unable to contact  Time Spent for Care: 30 minutes  More than 50% of total time spent on counseling and coordination of care as described above      Current Length of Stay: 2 day(s)  Current Patient Status: Inpatient   Certification Statement: The patient will continue to require additional inpatient hospital stay due to IV diuretics  Discharge Plan: Anticipate discharge in 24-48 hrs to discharge location to be determined pending rehab evaluations  Code Status: Level 1 - Full Code    Subjective:   Patient denies any acute complaints point denies shortness breath or chest pain, does report bleeding from 1 of his wounds in his legs    Objective:     Vitals:   Temp (24hrs), Av 7 °F (36 5 °C), Min:97 5 °F (36 4 °C), Max:98 °F (36 7 °C)    Temp:  [97 5 °F (36 4 °C)-98 °F (36 7 °C)] 97 6 °F (36 4 °C)  HR:  [60-83] 83  Resp:  [16-17] 17  BP: (113-132)/(52-67) 123/57  SpO2:  [92 %-99 %] 92 %  Body mass index is 29 89 kg/m²  Input and Output Summary (last 24 hours): Intake/Output Summary (Last 24 hours) at 2022 1903  Last data filed at 2022 1300  Gross per 24 hour   Intake 300 ml   Output 1950 ml   Net -1650 ml       Physical Exam:   Physical Exam  Vitals and nursing note reviewed  Constitutional:       General: He is not in acute distress  Appearance: He is well-developed  He is not toxic-appearing or diaphoretic  Comments: Disheveled appearance   HENT:      Head: Normocephalic and atraumatic  Eyes:      General: No scleral icterus  Conjunctiva/sclera: Conjunctivae normal    Cardiovascular:      Rate and Rhythm: Normal rate and regular rhythm  Heart sounds: No murmur heard  No friction rub  No gallop  Pulmonary:      Effort: Pulmonary effort is normal  No respiratory distress  Breath sounds: Normal breath sounds  No stridor  No wheezing, rhonchi or rales  Chest:      Chest wall: No tenderness  Abdominal:      General: There is no distension  Palpations: Abdomen is soft  There is no mass  Tenderness: There is no abdominal tenderness  There is no guarding or rebound  Hernia: No hernia is present  Musculoskeletal:         General: No swelling, tenderness, deformity or signs of injury  Cervical back: Neck supple        Right lower leg: Edema present  Left lower leg: Edema present  Comments: Chronic venous stasis changes with lower extremity wounds, does not appear infected, low concern for any cellulitic process   Skin:     General: Skin is warm and dry  Neurological:      Mental Status: He is alert and oriented to person, place, and time  Additional Data:     Labs:  Results from last 7 days   Lab Units 08/31/22  0555   WBC Thousand/uL 6 70   HEMOGLOBIN g/dL 12 1   HEMATOCRIT % 38 6   PLATELETS Thousands/uL 101*   NEUTROS PCT % 69   LYMPHS PCT % 19   MONOS PCT % 9   EOS PCT % 3     Results from last 7 days   Lab Units 08/31/22  0555 08/30/22  0500 08/29/22  1901   SODIUM mmol/L 137   < > 138   POTASSIUM mmol/L 4 0   < > 4 2   CHLORIDE mmol/L 100   < > 101   CO2 mmol/L 35*   < > 32   BUN mg/dL 34*   < > 29*   CREATININE mg/dL 1 60*   < > 1 56*   ANION GAP mmol/L 2*   < > 5   CALCIUM mg/dL 8 4   < > 8 2*   ALBUMIN g/dL  --   --  2 6*   TOTAL BILIRUBIN mg/dL  --   --  0 20   ALK PHOS U/L  --   --  86   ALT U/L  --   --  20   AST U/L  --   --  11   GLUCOSE RANDOM mg/dL 125   < > 312*    < > = values in this interval not displayed  Results from last 7 days   Lab Units 08/30/22  0500   INR  1 01     Results from last 7 days   Lab Units 08/31/22  1635 08/31/22  1219 08/31/22  0709 08/30/22  2030 08/30/22  1745 08/30/22  1721 08/30/22  1119 08/30/22  0644 08/29/22  2334   POC GLUCOSE mg/dl 190* 328* 139 248* 83 34* 231* 234* 331*               Lines/Drains:  Invasive Devices  Report    Peripheral Intravenous Line  Duration           Peripheral IV 08/29/22 Right Forearm 2 days                      Imaging: No pertinent imaging reviewed      Recent Cultures (last 7 days):         Last 24 Hours Medication List:   Current Facility-Administered Medications   Medication Dose Route Frequency Provider Last Rate    aspirin  81 mg Oral Daily Gudelilahn Cunningham, DO      carvedilol  6 25 mg Oral BID With Meals Villa Helmes, DO      cyclobenzaprine 10 mg Oral BID Evlyn Salle, DO      ferrous sulfate  325 mg Oral Daily With Breakfast Evlyn Salle, DO      fluticasone-vilanterol  1 puff Inhalation Daily Evlyn Salle, DO      [START ON 9/1/2022] furosemide  40 mg Oral BID (diuretic) Deyvi Banai, DO      gabapentin  100 mg Oral TID Evlyn Salle, DO      heparin (porcine)  5,000 Units Subcutaneous [facility] Evlyn Salle, DO      insulin aspart protamine-insulin aspart  45 Units Subcutaneous TID AC Evlyn Salle, DO      insulin lispro  2-12 Units Subcutaneous HS Evlyn Salle, DO      insulin lispro  4-20 Units Subcutaneous TID AC Evlyn Salle, DO      pravastatin  80 mg Oral Daily With [employer], DO      tamsulosin  0 4 mg Oral Daily With [employer] Kiran, DO      umeclidinium bromide  1 puff Inhalation Daily Evlyn Salle, DO          Today, Patient Was Seen By: Victor M Bender DO    **Please Note: This note may have been constructed using a voice recognition system  **

## 2022-08-31 NOTE — PLAN OF CARE
Problem: MOBILITY - ADULT  Goal: Maintain or return to baseline ADL function  Description: INTERVENTIONS:  -  Assess patient's ability to carry out ADLs; assess patient's baseline for ADL function and identify physical deficits which impact ability to perform ADLs (bathing, care of mouth/teeth, toileting, grooming, dressing, etc )  - Assess/evaluate cause of self-care deficits   - Assess range of motion  - Assess patient's mobility; develop plan if impaired  - Assess patient's need for assistive devices and provide as appropriate  - Encourage maximum independence but intervene and supervise when necessary  - Involve family in performance of ADLs  - Assess for home care needs following discharge   - Consider OT consult to assist with ADL evaluation and planning for discharge  - Provide patient education as appropriate  Outcome: Progressing  Goal: Maintains/Returns to pre admission functional level  Description: INTERVENTIONS:  - Perform BMAT or MOVE assessment daily    - Set and communicate daily mobility goal to care team and patient/family/caregiver  - Collaborate with rehabilitation services on mobility goals if consulted  - Perform Range of Motion  times a day  - Reposition patient every  hours    - Dangle patient  times a day  - Stand patient  times a day  - Ambulate patient  times a day  - Out of bed to chair  times a day   - Out of bed for meals  times a day  - Out of bed for toileting  - Record patient progress and toleration of activity level   Outcome: Progressing     Problem: Potential for Falls  Goal: Patient will remain free of falls  Description: INTERVENTIONS:  - Educate patient/family on patient safety including physical limitations  - Instruct patient to call for assistance with activity   - Consult OT/PT to assist with strengthening/mobility   - Keep Call bell within reach  - Keep bed low and locked with side rails adjusted as appropriate  - Keep care items and personal belongings within reach  - Initiate and maintain comfort rounds  - Make Fall Risk Sign visible to staff  - Offer Toileting every Hours, in advance of need  - Initiate/Maintain alarm  - Obtain necessary fall risk management equipment:   - Apply yellow socks and bracelet for high fall risk patients  - Consider moving patient to room near nurses station  Outcome: Progressing     Problem: SAFETY ADULT  Goal: Maintain or return to baseline ADL function  Description: INTERVENTIONS:  -  Assess patient's ability to carry out ADLs; assess patient's baseline for ADL function and identify physical deficits which impact ability to perform ADLs (bathing, care of mouth/teeth, toileting, grooming, dressing, etc )  - Assess/evaluate cause of self-care deficits   - Assess range of motion  - Assess patient's mobility; develop plan if impaired  - Assess patient's need for assistive devices and provide as appropriate  - Encourage maximum independence but intervene and supervise when necessary  - Involve family in performance of ADLs  - Assess for home care needs following discharge   - Consider OT consult to assist with ADL evaluation and planning for discharge  - Provide patient education as appropriate  Outcome: Progressing  Goal: Maintains/Returns to pre admission functional level  Description: INTERVENTIONS:  - Perform BMAT or MOVE assessment daily    - Set and communicate daily mobility goal to care team and patient/family/caregiver  - Collaborate with rehabilitation services on mobility goals if consulted  - Perform Range of Motion  times a day  - Reposition patient every  hours    - Dangle patient  times a day  - Stand patient  times a day  - Ambulate patient  times a day  - Out of bed to chair  times a day   - Out of bed for meals  times a day  - Out of bed for toileting  - Record patient progress and toleration of activity level   Outcome: Progressing  Goal: Patient will remain free of falls  Description: INTERVENTIONS:  - Educate patient/family on patient safety including physical limitations  - Instruct patient to call for assistance with activity   - Consult OT/PT to assist with strengthening/mobility   - Keep Call bell within reach  - Keep bed low and locked with side rails adjusted as appropriate  - Keep care items and personal belongings within reach  - Initiate and maintain comfort rounds  - Make Fall Risk Sign visible to staff  - Offer Toileting every  Hours, in advance of need  - Initiate/Maintain alarm  - Obtain necessary fall risk management equipment:   - Apply yellow socks and bracelet for high fall risk patients  - Consider moving patient to room near nurses station  Outcome: Progressing     Problem: Knowledge Deficit  Goal: Patient/family/caregiver demonstrates understanding of disease process, treatment plan, medications, and discharge instructions  Description: Complete learning assessment and assess knowledge base    Interventions:  - Provide teaching at level of understanding  - Provide teaching via preferred learning methods  Outcome: Progressing     Problem: CARDIOVASCULAR - ADULT  Goal: Maintains optimal cardiac output and hemodynamic stability  Description: INTERVENTIONS:  - Monitor I/O, vital signs and rhythm  - Monitor for S/S and trends of decreased cardiac output  - Administer and titrate ordered vasoactive medications to optimize hemodynamic stability  - Assess quality of pulses, skin color and temperature  - Assess for signs of decreased coronary artery perfusion  - Instruct patient to report change in severity of symptoms  Outcome: Progressing  Goal: Absence of cardiac dysrhythmias or at baseline rhythm  Description: INTERVENTIONS:  - Continuous cardiac monitoring, vital signs, obtain 12 lead EKG if ordered  - Administer antiarrhythmic and heart rate control medications as ordered  - Monitor electrolytes and administer replacement therapy as ordered  Outcome: Progressing     Problem: RESPIRATORY - ADULT  Goal: Achieves optimal ventilation and oxygenation  Description: INTERVENTIONS:  - Assess for changes in respiratory status  - Assess for changes in mentation and behavior  - Position to facilitate oxygenation and minimize respiratory effort  - Oxygen administered by appropriate delivery if ordered  - Initiate smoking cessation education as indicated  - Encourage broncho-pulmonary hygiene including cough, deep breathe, Incentive Spirometry  - Assess the need for suctioning and aspirate as needed  - Assess and instruct to report SOB or any respiratory difficulty  - Respiratory Therapy support as indicated  Outcome: Progressing     Problem: METABOLIC, FLUID AND ELECTROLYTES - ADULT  Goal: Electrolytes maintained within normal limits  Description: INTERVENTIONS:  - Monitor labs and assess patient for signs and symptoms of electrolyte imbalances  - Administer electrolyte replacement as ordered  - Monitor response to electrolyte replacements, including repeat lab results as appropriate  - Instruct patient on fluid and nutrition as appropriate  Outcome: Progressing  Goal: Fluid balance maintained  Description: INTERVENTIONS:  - Monitor labs   - Monitor I/O and WT  - Instruct patient on fluid and nutrition as appropriate  - Assess for signs & symptoms of volume excess or deficit  Outcome: Progressing  Goal: Glucose maintained within target range  Description: INTERVENTIONS:  - Monitor Blood Glucose as ordered  - Assess for signs and symptoms of hyperglycemia and hypoglycemia  - Administer ordered medications to maintain glucose within target range  - Assess nutritional intake and initiate nutrition service referral as needed  Outcome: Progressing     Problem: Nutrition/Hydration-ADULT  Goal: Nutrient/Hydration intake appropriate for improving, restoring or maintaining nutritional needs  Description: Monitor and assess patient's nutrition/hydration status for malnutrition   Collaborate with interdisciplinary team and initiate plan and interventions as ordered  Monitor patient's weight and dietary intake as ordered or per policy  Utilize nutrition screening tool and intervene as necessary  Determine patient's food preferences and provide high-protein, high-caloric foods as appropriate       INTERVENTIONS:  - Monitor oral intake, urinary output, labs, and treatment plans  - Assess nutrition and hydration status and recommend course of action  - Evaluate amount of meals eaten  - Assist patient with eating if necessary   - Allow adequate time for meals  - Recommend/ encourage appropriate diets, oral nutritional supplements, and vitamin/mineral supplements  - Order, calculate, and assess calorie counts as needed  - Recommend, monitor, and adjust tube feedings and TPN/PPN based on assessed needs  - Assess need for intravenous fluids  - Provide specific nutrition/hydration education as appropriate  - Include patient/family/caregiver in decisions related to nutrition  Outcome: Progressing

## 2022-08-31 NOTE — ASSESSMENT & PLAN NOTE
Wt Readings from Last 3 Encounters:   08/31/22 100 kg (220 lb 6 4 oz)   08/25/22 103 kg (228 lb)   08/21/22 99 8 kg (220 lb)     · Patient noting increasing shortness of breath x3 days along with orthopnea and leg swelling  CXR with evidence of pulmonary vascular congestion/increased size of right pleural effusion  · Patient responded well to 40 mg IV Lasix yesterday, will continue on 40 mg IV Lasix b i d  And monitor urine output  Trend creatinine    Plan to transition to oral diuretics tomorrow  · Hold losartan  · Echocardiogram 05/2022 EF 71%, normal diastolic function  · Monitor daily weights, I/O, volume status  · Low-sodium, fluid-restricted diet  · Monitor electrolytes, creatinine while on IV diuresis

## 2022-09-01 LAB
ANION GAP SERPL CALCULATED.3IONS-SCNC: 4 MMOL/L (ref 4–13)
BUN SERPL-MCNC: 38 MG/DL (ref 5–25)
CALCIUM SERPL-MCNC: 8.6 MG/DL (ref 8.3–10.1)
CHLORIDE SERPL-SCNC: 100 MMOL/L (ref 96–108)
CO2 SERPL-SCNC: 36 MMOL/L (ref 21–32)
CREAT SERPL-MCNC: 1.54 MG/DL (ref 0.6–1.3)
ERYTHROCYTE [DISTWIDTH] IN BLOOD BY AUTOMATED COUNT: 15.7 % (ref 11.6–15.1)
GFR SERPL CREATININE-BSD FRML MDRD: 43 ML/MIN/1.73SQ M
GLUCOSE SERPL-MCNC: 128 MG/DL (ref 65–140)
GLUCOSE SERPL-MCNC: 185 MG/DL (ref 65–140)
GLUCOSE SERPL-MCNC: 256 MG/DL (ref 65–140)
GLUCOSE SERPL-MCNC: 65 MG/DL (ref 65–140)
GLUCOSE SERPL-MCNC: 86 MG/DL (ref 65–140)
HCT VFR BLD AUTO: 36.2 % (ref 36.5–49.3)
HGB BLD-MCNC: 11.3 G/DL (ref 12–17)
MCH RBC QN AUTO: 26.7 PG (ref 26.8–34.3)
MCHC RBC AUTO-ENTMCNC: 31.2 G/DL (ref 31.4–37.4)
MCV RBC AUTO: 86 FL (ref 82–98)
PLATELET # BLD AUTO: 118 THOUSANDS/UL (ref 149–390)
PMV BLD AUTO: 11.7 FL (ref 8.9–12.7)
POTASSIUM SERPL-SCNC: 3.9 MMOL/L (ref 3.5–5.3)
RBC # BLD AUTO: 4.23 MILLION/UL (ref 3.88–5.62)
SODIUM SERPL-SCNC: 140 MMOL/L (ref 135–147)
WBC # BLD AUTO: 7.93 THOUSAND/UL (ref 4.31–10.16)

## 2022-09-01 PROCEDURE — 80048 BASIC METABOLIC PNL TOTAL CA: CPT | Performed by: INTERNAL MEDICINE

## 2022-09-01 PROCEDURE — 82948 REAGENT STRIP/BLOOD GLUCOSE: CPT

## 2022-09-01 PROCEDURE — 85027 COMPLETE CBC AUTOMATED: CPT | Performed by: INTERNAL MEDICINE

## 2022-09-01 PROCEDURE — 99232 SBSQ HOSP IP/OBS MODERATE 35: CPT | Performed by: INTERNAL MEDICINE

## 2022-09-01 PROCEDURE — 97166 OT EVAL MOD COMPLEX 45 MIN: CPT

## 2022-09-01 RX ORDER — CARVEDILOL 6.25 MG/1
6.25 TABLET ORAL 2 TIMES DAILY WITH MEALS
Qty: 60 TABLET | Refills: 0 | Status: ON HOLD | OUTPATIENT
Start: 2022-09-01 | End: 2022-10-24

## 2022-09-01 RX ORDER — INSULIN ASPART 100 [IU]/ML
40 INJECTION, SUSPENSION SUBCUTANEOUS
Status: DISCONTINUED | OUTPATIENT
Start: 2022-09-01 | End: 2022-09-02 | Stop reason: HOSPADM

## 2022-09-01 RX ORDER — INSULIN LISPRO PROTAMIN/LISPRO 75-25/ML
45 VIAL (ML) SUBCUTANEOUS 3 TIMES DAILY
Qty: 40.5 ML | Refills: 0 | Status: ON HOLD | OUTPATIENT
Start: 2022-09-01 | End: 2022-10-15

## 2022-09-01 RX ORDER — INSULIN LISPRO 100 [IU]/ML
1-5 INJECTION, SOLUTION INTRAVENOUS; SUBCUTANEOUS
Status: DISCONTINUED | OUTPATIENT
Start: 2022-09-01 | End: 2022-09-02 | Stop reason: HOSPADM

## 2022-09-01 RX ORDER — LIDOCAINE 50 MG/G
1 PATCH TOPICAL DAILY
Status: DISCONTINUED | OUTPATIENT
Start: 2022-09-01 | End: 2022-09-02 | Stop reason: HOSPADM

## 2022-09-01 RX ORDER — CYCLOBENZAPRINE HCL 10 MG
10 TABLET ORAL 2 TIMES DAILY PRN
Qty: 21 TABLET | Refills: 0 | Status: ON HOLD | OUTPATIENT
Start: 2022-09-01

## 2022-09-01 RX ORDER — ACETAMINOPHEN 325 MG/1
650 TABLET ORAL EVERY 6 HOURS PRN
Status: DISCONTINUED | OUTPATIENT
Start: 2022-09-01 | End: 2022-09-02 | Stop reason: HOSPADM

## 2022-09-01 RX ORDER — ASPIRIN 81 MG/1
81 TABLET ORAL DAILY
Qty: 30 TABLET | Refills: 0 | Status: ON HOLD | OUTPATIENT
Start: 2022-09-01 | End: 2022-10-23

## 2022-09-01 RX ORDER — GABAPENTIN 100 MG/1
100 CAPSULE ORAL 3 TIMES DAILY
Qty: 90 CAPSULE | Refills: 0 | Status: ON HOLD | OUTPATIENT
Start: 2022-09-01

## 2022-09-01 RX ORDER — FERROUS SULFATE 325(65) MG
325 TABLET ORAL
Qty: 30 TABLET | Refills: 0 | Status: ON HOLD | OUTPATIENT
Start: 2022-09-01 | End: 2022-10-23

## 2022-09-01 RX ORDER — FERROUS SULFATE 325(65) MG
325 TABLET ORAL EVERY OTHER DAY
Status: DISCONTINUED | OUTPATIENT
Start: 2022-09-01 | End: 2022-09-02 | Stop reason: HOSPADM

## 2022-09-01 RX ORDER — TAMSULOSIN HYDROCHLORIDE 0.4 MG/1
0.4 CAPSULE ORAL
Qty: 30 CAPSULE | Refills: 0 | Status: ON HOLD | OUTPATIENT
Start: 2022-09-01 | End: 2022-10-23

## 2022-09-01 RX ORDER — SIMVASTATIN 40 MG
40 TABLET ORAL DAILY
Qty: 30 TABLET | Refills: 0 | Status: ON HOLD | OUTPATIENT
Start: 2022-09-01 | End: 2022-10-23

## 2022-09-01 RX ADMIN — TAMSULOSIN HYDROCHLORIDE 0.4 MG: 0.4 CAPSULE ORAL at 16:33

## 2022-09-01 RX ADMIN — GABAPENTIN 100 MG: 100 CAPSULE ORAL at 16:33

## 2022-09-01 RX ADMIN — FUROSEMIDE 40 MG: 40 TABLET ORAL at 16:33

## 2022-09-01 RX ADMIN — ACETAMINOPHEN 650 MG: 325 TABLET ORAL at 22:07

## 2022-09-01 RX ADMIN — LIDOCAINE 5% 1 PATCH: 700 PATCH TOPICAL at 22:09

## 2022-09-01 RX ADMIN — GABAPENTIN 100 MG: 100 CAPSULE ORAL at 09:58

## 2022-09-01 RX ADMIN — UMECLIDINIUM 1 PUFF: 62.5 AEROSOL, POWDER ORAL at 10:00

## 2022-09-01 RX ADMIN — INSULIN ASPART 40 UNITS: 100 INJECTION, SUSPENSION SUBCUTANEOUS at 12:44

## 2022-09-01 RX ADMIN — PRAVASTATIN SODIUM 80 MG: 80 TABLET ORAL at 16:34

## 2022-09-01 RX ADMIN — GABAPENTIN 100 MG: 100 CAPSULE ORAL at 20:57

## 2022-09-01 RX ADMIN — CYCLOBENZAPRINE HYDROCHLORIDE 10 MG: 10 TABLET, FILM COATED ORAL at 09:59

## 2022-09-01 RX ADMIN — HEPARIN SODIUM 5000 UNITS: 5000 INJECTION INTRAVENOUS; SUBCUTANEOUS at 06:12

## 2022-09-01 RX ADMIN — FERROUS SULFATE TAB 325 MG (65 MG ELEMENTAL FE) 325 MG: 325 (65 FE) TAB at 09:58

## 2022-09-01 RX ADMIN — ASPIRIN 81 MG: 81 TABLET, COATED ORAL at 09:58

## 2022-09-01 RX ADMIN — CARVEDILOL 6.25 MG: 6.25 TABLET, FILM COATED ORAL at 09:58

## 2022-09-01 RX ADMIN — HEPARIN SODIUM 5000 UNITS: 5000 INJECTION INTRAVENOUS; SUBCUTANEOUS at 13:31

## 2022-09-01 RX ADMIN — INSULIN LISPRO 2 UNITS: 100 INJECTION, SOLUTION INTRAVENOUS; SUBCUTANEOUS at 12:43

## 2022-09-01 RX ADMIN — CYCLOBENZAPRINE HYDROCHLORIDE 10 MG: 10 TABLET, FILM COATED ORAL at 20:57

## 2022-09-01 RX ADMIN — HEPARIN SODIUM 5000 UNITS: 5000 INJECTION INTRAVENOUS; SUBCUTANEOUS at 21:00

## 2022-09-01 RX ADMIN — FLUTICASONE FUROATE AND VILANTEROL TRIFENATATE 1 PUFF: 100; 25 POWDER RESPIRATORY (INHALATION) at 09:59

## 2022-09-01 RX ADMIN — FUROSEMIDE 40 MG: 40 TABLET ORAL at 09:59

## 2022-09-01 RX ADMIN — CARVEDILOL 6.25 MG: 6.25 TABLET, FILM COATED ORAL at 16:33

## 2022-09-01 NOTE — OCCUPATIONAL THERAPY NOTE
Occupational Therapy Evaluation     Patient Name: Ranjan Rivers  Today's Date: 9/1/2022  Problem List  Principal Problem:    Acute on chronic diastolic (congestive) heart failure  Active Problems:    Type 2 diabetes mellitus with hyperglycemia, with long-term current use of insulin (HCC)    COPD, severe (HCC)    Pleural effusion on right    Essential hypertension    Chronic respiratory failure with hypoxia (HCC)    CKD (chronic kidney disease) stage 3, GFR 30-59 ml/min ([facility])    Medication management    Frequent hospital admissions    Past Medical History  Past Medical History:   Diagnosis Date    Abdominal pain     MARANDA (acute kidney injury) ([facility] [address] ) 6/19/2018    Cardiac disease     CHF (congestive heart failure) (HCC)     COPD, severe (HCC)     Coronary artery disease     DDD (degenerative disc disease), lumbar 9/1/2020    Diabetes mellitus (HCC)     Dyspnea     GERD (gastroesophageal reflux disease)     Hyperlipidemia     Hypertension     MI (myocardial infarction) ([facility] [address] )     with 3 stents    Nodule of apex of right lung     JACQUELINE (obstructive sleep apnea)     Prostate cancer [facility])      Past Surgical History  Past Surgical History:   Procedure Laterality Date    ABDOMINAL SURGERY      exploratory    ANGIOPLASTY      3 stents    APPENDECTOMY      COLONOSCOPY  2015    CT NEEDLE BIOPSY LUNG  11/3/2020    ESOPHAGOGASTRODUODENOSCOPY N/A 10/2/2017    Procedure: ESOPHAGOGASTRODUODENOSCOPY (EGD); Surgeon: Daniel Samaniego MD;  Location: [facility];   Service: Gastroenterology    IR THORACENTESIS  11/3/2020    KNEE CARTILAGE SURGERY      OTHER SURGICAL HISTORY      stent placement    PROSTATE SURGERY      SKIN GRAFT      Basal cell CA back        09/01/22 1336   OT Last Visit   OT Visit Date 09/01/22   Note Type   Note type Evaluation   Restrictions/Precautions   Weight Bearing Precautions Per Order No   Pain Assessment   Pain Assessment Tool 0-10   Pain Score No Pain   Home Living   Type of Home House   Home Layout Two level;Performs ADLs on one level; Able to live on main level with bedroom/bathroom  (1 tono, ffsu)   Bathroom Shower/Tub Walk-in shower   Bathroom Toilet Raised   Bathroom Equipment Shower chair   [address] Walker;Cane  (not using)   Prior Function   Level of [place] Independent with ADLs and functional mobility   Lives With Son   Receives Help From [facility], [place] in the last 6 months 0   Vocational Retired   Lifestyle   Autonomy pta, pt was I W ADL/IADL, no AD mobility  Reciprocal Relationships supportive son who works night shift, is home during the day to assist    Service to Others retired   Intrinsic Gratification crossword puzzles   Psychosocial   Psychosocial (WDL) [address] "I am doing ok"   ADL   Where Assessed Edge of bed   Eating Assistance 6  Modified independent   Grooming Assistance 6  [address] [place] 6  Modified Independent   LB [address]  [address]  6  Modified independent   [address]  [address]  5  [address] 5  Supervision/Setup   Bed Mobility   Additional Comments pt found and left sitting EOB w all needs in reach, tray table infront of him   Transfers   Sit to Stand 6  Modified independent   Additional items Increased time required   Stand to Sit 6  Modified independent   Additional items Increased time required   Additional Comments c rw   Functional Mobility   Functional Mobility 6  Modified independent   Additional Comments c rw, household + distance  Pt reports he typically doesn't walk as far as he did today     Additional items Rolling walker   Balance   Static Sitting Good   Dynamic Sitting Fair +   Static Standing Fair   Dynamic Standing Fair -   Ambulatory Fair -   Activity Tolerance   Activity Tolerance Patient tolerated treatment well   Nurse Made Aware ok per RN   RUE Assessment   RUE Assessment WFL   LUE Assessment   LUE Assessment WFL   Hand Function   Gross Motor Coordination Functional   Fine Motor Coordination Functional   Cognition   Overall Cognitive Status WFL   Arousal/Participation Responsive; Alert; Cooperative   Attention Within functional limits   Orientation Level Oriented X4   Memory Within functional limits   Following Commands Follows all commands and directions without difficulty   Comments pt pleasant and cooperative, G safety awareness and insight condition  Assessment   Prognosis Good   Assessment Pt is a 78 y o  male admitted 8/29/22 w acute on chornic diastolic CHF- pt reports he was unable to afford his medications, due to having to pay for unexpected car repairs, therefore presented to ED w worsening SOB  Pt w active OT eval and treat orders  PMH includes  has a past medical history of Abdominal pain, MARANDA (acute kidney injury) ([address] ), Cardiac disease, CHF (congestive heart failure) ([address] ), COPD, severe ([address] ), Coronary artery disease, DDD (degenerative disc disease), lumbar, Diabetes mellitus ([address] ), Dyspnea, GERD (gastroesophageal reflux disease), Hyperlipidemia, Hypertension, MI (myocardial infarction) ([address] ), Nodule of apex of right lung, JACQUELINE (obstructive sleep apnea), and Prostate cancer ([address] )  Pt lives w son in a 2 SH with 1 VINI, reports first floor set-up including walk in shower with shower chair and raised toilet  Pta, pt was independent w/ ADL/IADL and functional mobility, was driving and was not using any DME at baseline- has RW and SPC if needed  Currently, pt is mod I for UB ADL, S for LB ADL and completed transfers/FM w RW- rec use of RW in future, pt agreeable  From OT standpoint, pt is functioning at baseline level and does not appear to require additional acute OT at this time   Discussed pt's comfort with d/c from OT and any concerns with returning to previous environment; pt does not report any at this time  The patient's raw score on the AM-PAC Daily Activity inpatient short form is 24, standardized score is 57 54, greater than 39 4  Patients at this level are likely to benefit from discharge to home  Please refer to the recommendation of the Occupational Therapist for safe discharge planning  From OT perspective, pt should D/C HOME when medically stable  Acute OT no longer rq at this time, D/C OT     Goals   Patient Goals to get home   Recommendation   OT Discharge Recommendation No rehabilitation needs   AM-PAC Daily Activity Inpatient   Lower Body Dressing 4   Bathing 4   Toileting 4   Upper Body Dressing 4   Grooming 4   Eating 4   Daily Activity Raw Score 24   Daily Activity Standardized Score (Calc for Raw Score >=11) 57 54   AM-PAC Applied Cognition Inpatient   Following a Speech/Presentation 4   Understanding Ordinary Conversation 4   Taking Medications 4   Remembering Where Things Are Placed or Put Away 4   Remembering List of 4-5 Errands 4   Taking Care of Complicated Tasks 4   Applied Cognition Raw Score 24   Applied Cognition Standardized Score 62 21   Modified [place] Scale   Modified [place] Scale 2         Larry Isbell, MOT, OTR/L

## 2022-09-01 NOTE — PROGRESS NOTES
[address] [facility]  Progress Note - Marcy Salinas Sr  1945, 78 y o  male MRN: [id number]  Unit/Bed#: -01 Encounter: [id number]  Primary Care Provider: Jerlene Sacks, MD   Date and time admitted to hospital: 8/29/2022  5:53 PM    * Acute on chronic diastolic (congestive) heart failure  Assessment & Plan  Wt Readings from Last 3 Encounters:   09/01/22 100 kg (220 lb 7 4 oz)   08/25/22 103 kg (228 lb)   08/21/22 99 8 kg (220 lb)     · Patient noting increasing shortness of breath x3 days along with orthopnea and leg swelling  CXR with evidence of pulmonary vascular congestion/increased size of right pleural effusion  · Transitioned to po lasix starting today  Monitor overnight to ensure optimal diuresis  · Hold losartan  · Echocardiogram 05/2022 EF 63%, normal diastolic function  · Monitor daily weights, I/O, volume status  · Low-sodium, fluid-restricted diet  · Monitor electrolytes, creatinine while on IV diuresis        Frequent hospital admissions  Assessment & Plan  · Cm on board    Medication management  Assessment & Plan  · Patient stating he had run out of several medications at home, unable to refill due to financial difficulties  Apparently had to fix his car  · Case management on board  Will be provided 30 day supply of meds at no cost, patient reports that thereafter he will be able to afford his meds       CKD (chronic kidney disease) stage 3, GFR 30-59 ml/min [facility])  Assessment & Plan  Lab Results   Component Value Date    EGFR 43 09/01/2022    EGFR 41 08/31/2022    EGFR 48 08/30/2022    CREATININE 1 54 (H) 09/01/2022    CREATININE 1 60 (H) 08/31/2022    CREATININE 1 42 (H) 08/30/2022   · Creatinine at baseline, monitor with BMP  · Hold losartan  · Recheck BMP tomorrow to trend cr    Chronic respiratory failure with hypoxia (HCC)  Assessment & Plan  · Per pt chronically on 2L prn  · Continue supplemental oxygen as needed to maintain SaO2 greater than 88%  · Incentive spirometry, pulmonary toileting    Essential hypertension  Assessment & Plan  · Continue home antihypertensives  · Hold losartan to allow optimal bp tolerance for diuresis    Pleural effusion on right  Assessment & Plan  · CXR appears with increased size of right pleural effusion, has required thoracentesis previously  · Patient currently comfortable on room air, transitioned to oral diuretics today      COPD, severe ([address] )  Assessment & Plan  · Not in acute exacerbation, continue home inhalers    Type 2 diabetes mellitus with hyperglycemia, with long-term current use of insulin [facility])  Assessment & Plan  Lab Results   Component Value Date    HGBA1C 10 3 (H) 2022       Recent Labs     22  1635 22  2053 22  0625 22  1145   POCGLU 190* 103 86 256*       Blood Sugar Average: Last 72 hrs:  · (P) 781 [id number] not controlled and with hyperglycemia on presentation, due to non compliance  · Resumed Humalog mix 45 units t i d  with meals  · Cont ISS         VTE Pharmacologic Prophylaxis:   Pharmacologic: Heparin  Mechanical VTE Prophylaxis in Place: No    Patient Centered Rounds: I have performed bedside rounds with nursing staff today  Discussions with Specialists or Other Care Team Provider:     Education and Discussions with Family / Patient: Patient  Called his son Demar Pastrana no answer    Time Spent for Care: 30 minutes  More than 50% of total time spent on counseling and coordination of care as described above  Current Length of Stay: 3 day(s)    Current Patient Status: Inpatient   Certification Statement: The patient will continue to require additional inpatient hospital stay due to CHF exacerbation    Discharge Plan: D/c planning tomorrow    Code Status: Level 1 - Full Code      Subjective:   Sitting up in bed  Denies dyspnea   Presently off oxygen    Objective:     Vitals:   Temp (24hrs), Av 8 °F (36 6 °C), Min:97 4 °F (36 3 °C), Max:98 1 °F (36 7 °C)    Temp:  [97 4 °F (36 3 °C)-98 1 °F (36 7 °C)] 97 9 °F (36 6 °C)  HR:  [60-72] 60  Resp:  [17-19] 18  BP: (116-129)/(50-59) 116/50  SpO2:  [92 %-96 %] 93 %  Body mass index is 29 9 kg/m²  Input and Output Summary (last 24 hours): Intake/Output Summary (Last 24 hours) at 9/1/2022 1645  Last data filed at 9/1/2022 0626  Gross per 24 hour   Intake 358 ml   Output 1525 ml   Net -1167 ml       Physical Exam:     Physical Exam  Cardiovascular:      Rate and Rhythm: Normal rate and regular rhythm  Pulses: Normal pulses  Heart sounds: Normal heart sounds  No murmur heard  Pulmonary:      Effort: Pulmonary effort is normal  No respiratory distress  Breath sounds: Normal breath sounds  No wheezing or rales  Abdominal:      General: Bowel sounds are normal  There is no distension  Palpations: Abdomen is soft  Tenderness: There is no abdominal tenderness  There is no guarding  Musculoskeletal:         General: Normal range of motion  Cervical back: Normal range of motion and neck supple  Right lower leg: Edema present  Left lower leg: Edema present  Comments: Trace edema   Skin:     General: Skin is warm and dry  Comments: + multiple superficial LE wounds   Neurological:      General: No focal deficit present  Mental Status: He is alert and oriented to person, place, and time  Mental status is at baseline  Cranial Nerves: No cranial nerve deficit  Motor: No weakness             Additional Data:     Labs:    Results from last 7 days   Lab Units 09/01/22  0409 08/31/22  0555   WBC Thousand/uL 7 93 6 70   HEMOGLOBIN g/dL 11 3* 12 1   HEMATOCRIT % 36 2* 38 6   PLATELETS Thousands/uL 118* 101*   NEUTROS PCT %  --  69   LYMPHS PCT %  --  19   MONOS PCT %  --  9   EOS PCT %  --  3     Results from last 7 days   Lab Units 09/01/22  0409 08/30/22  0500 08/29/22  1901   SODIUM mmol/L 140   < > 138   POTASSIUM mmol/L 3 9   < > 4 2   CHLORIDE mmol/L 100   < > 101   CO2 mmol/L 36*   < > 32   BUN mg/dL 38*   < > 29*   CREATININE mg/dL 1 54*   < > 1 56*   ANION GAP mmol/L 4   < > 5   CALCIUM mg/dL 8 6   < > 8 2*   ALBUMIN g/dL  --   --  2 6*   TOTAL BILIRUBIN mg/dL  --   --  0 20   ALK PHOS U/L  --   --  86   ALT U/L  --   --  20   AST U/L  --   --  11   GLUCOSE RANDOM mg/dL 65   < > 312*    < > = values in this interval not displayed  Results from last 7 days   Lab Units 08/30/22  0500   INR  1 01     Results from last 7 days   Lab Units 09/01/22  1145 09/01/22  0625 08/31/22  2053 08/31/22  1635 08/31/22  1219 08/31/22  0709 08/30/22  2030 08/30/22  1745 08/30/22  1721 08/30/22  1119 08/30/22  0644 08/29/22  2334   POC GLUCOSE mg/dl 256* 86 103 190* 328* 139 248* 83 34* 231* 234* 331*                   * I Have Reviewed All Lab Data Listed Above  * Additional Pertinent Lab Tests Reviewed:  All Labs Within Last 24 Hours Reviewed    Imaging:    Imaging Reports Reviewed Today Include:   Imaging Personally Reviewed by Myself Includes:      Recent Cultures (last 7 days):           Last 24 Hours Medication List:   Current Facility-Administered Medications   Medication Dose Route Frequency Provider Last Rate    aspirin  81 mg Oral Daily Bertis Spar, DO      carvedilol  6 25 mg Oral BID With Meals Bertis Spar, DO      cyclobenzaprine  10 mg Oral BID Bertis Spar, DO      ferrous sulfate  325 mg Oral Every Other Day Deyvi Banai, DO      fluticasone-vilanterol  1 puff Inhalation Daily Bertis Spar, DO      furosemide  40 mg Oral BID (diuretic) Deyvi Banai, DO      gabapentin  100 mg Oral TID Bertis Spar, DO      heparin (porcine)  5,000 Units Subcutaneous Q8H [address] Bertis Spar, DO      insulin aspart protamine-insulin aspart  40 Units Subcutaneous TID [facility], DO      insulin lispro  1-5 Units Subcutaneous TID [facility], DO      pravastatin  80 mg Oral Daily With [employer], DO      tamsulosin  0 4 mg Oral Daily With [employer] Divina Miles DO      umeclidinium bromide  1 puff Inhalation Daily Divina Miles DO          Today, Patient Was Seen By: Guzman Calabrese DO    ** Please Note: Dictation voice to text software may have been used in the creation of this document   **

## 2022-09-01 NOTE — ASSESSMENT & PLAN NOTE
Wt Readings from Last 3 Encounters:   09/01/22 100 kg (220 lb 7 4 oz)   08/25/22 103 kg (228 lb)   08/21/22 99 8 kg (220 lb)     · Patient noting increasing shortness of breath x3 days along with orthopnea and leg swelling  CXR with evidence of pulmonary vascular congestion/increased size of right pleural effusion  · Transitioned to po lasix starting today   Monitor overnight to ensure optimal diuresis  · Hold losartan  · Echocardiogram 05/2022 EF 31%, normal diastolic function  · Monitor daily weights, I/O, volume status  · Low-sodium, fluid-restricted diet  · Monitor electrolytes, creatinine while on IV diuresis

## 2022-09-01 NOTE — ASSESSMENT & PLAN NOTE
Lab Results   Component Value Date    EGFR 43 09/01/2022    EGFR 41 08/31/2022    EGFR 48 08/30/2022    CREATININE 1 54 (H) 09/01/2022    CREATININE 1 60 (H) 08/31/2022    CREATININE 1 42 (H) 08/30/2022   · Creatinine at baseline, monitor with BMP  · Hold losartan  · Recheck BMP tomorrow to trend cr

## 2022-09-01 NOTE — PLAN OF CARE
Problem: MOBILITY - ADULT  Goal: Maintain or return to baseline ADL function  Description: INTERVENTIONS:  -  Assess patient's ability to carry out ADLs; assess patient's baseline for ADL function and identify physical deficits which impact ability to perform ADLs (bathing, care of mouth/teeth, toileting, grooming, dressing, etc )  - Assess/evaluate cause of self-care deficits   - Assess range of motion  - Assess patient's mobility; develop plan if impaired  - Assess patient's need for assistive devices and provide as appropriate  - Encourage maximum independence but intervene and supervise when necessary  - Involve family in performance of ADLs  - Assess for home care needs following discharge   - Consider OT consult to assist with ADL evaluation and planning for discharge  - Provide patient education as appropriate  Outcome: Progressing  Goal: Maintains/Returns to pre admission functional level  Description: INTERVENTIONS:  - Perform BMAT or MOVE assessment daily    - Set and communicate daily mobility goal to care team and patient/family/caregiver  - Collaborate with rehabilitation services on mobility goals if consulted  - Perform Range of Motion  times a day  - Reposition patient every  hours    - Dangle patient  times a day  - Stand patient  times a day  - Ambulate patient  times a day  - Out of bed to chair  times a day   - Out of bed for meals  times a day  - Out of bed for toileting  - Record patient progress and toleration of activity level   Outcome: Progressing     Problem: Potential for Falls  Goal: Patient will remain free of falls  Description: INTERVENTIONS:  - Educate patient/family on patient safety including physical limitations  - Instruct patient to call for assistance with activity   - Consult OT/PT to assist with strengthening/mobility   - Keep Call bell within reach  - Keep bed low and locked with side rails adjusted as appropriate  - Keep care items and personal belongings within reach  - Initiate and maintain comfort rounds  - Make Fall Risk Sign visible to staff  - Offer Toileting every  Hours, in advance of need  - Initiate/Maintain alarm  - Obtain necessary fall risk management equipment:   - Apply yellow socks and bracelet for high fall risk patients  - Consider moving patient to room near nurses station  Outcome: Progressing     Problem: SAFETY ADULT  Goal: Maintain or return to baseline ADL function  Description: INTERVENTIONS:  -  Assess patient's ability to carry out ADLs; assess patient's baseline for ADL function and identify physical deficits which impact ability to perform ADLs (bathing, care of mouth/teeth, toileting, grooming, dressing, etc )  - Assess/evaluate cause of self-care deficits   - Assess range of motion  - Assess patient's mobility; develop plan if impaired  - Assess patient's need for assistive devices and provide as appropriate  - Encourage maximum independence but intervene and supervise when necessary  - Involve family in performance of ADLs  - Assess for home care needs following discharge   - Consider OT consult to assist with ADL evaluation and planning for discharge  - Provide patient education as appropriate  Outcome: Progressing  Goal: Maintains/Returns to pre admission functional level  Description: INTERVENTIONS:  - Perform BMAT or MOVE assessment daily    - Set and communicate daily mobility goal to care team and patient/family/caregiver  - Collaborate with rehabilitation services on mobility goals if consulted  - Perform Range of Motion  times a day  - Reposition patient every  hours    - Dangle patient  times a day  - Stand patient  times a day  - Ambulate patient  times a day  - Out of bed to chair  times a day   - Out of bed for meals  times a day  - Out of bed for toileting  - Record patient progress and toleration of activity level   Outcome: Progressing  Goal: Patient will remain free of falls  Description: INTERVENTIONS:  - Educate patient/family on patient safety including physical limitations  - Instruct patient to call for assistance with activity   - Consult OT/PT to assist with strengthening/mobility   - Keep Call bell within reach  - Keep bed low and locked with side rails adjusted as appropriate  - Keep care items and personal belongings within reach  - Initiate and maintain comfort rounds  - Make Fall Risk Sign visible to staff  - Offer Toileting every  Hours, in advance of need  - Initiate/Maintain alarm  - Obtain necessary fall risk management equipment:  - Apply yellow socks and bracelet for high fall risk patients  - Consider moving patient to room near nurses station  Outcome: Progressing     Problem: Knowledge Deficit  Goal: Patient/family/caregiver demonstrates understanding of disease process, treatment plan, medications, and discharge instructions  Description: Complete learning assessment and assess knowledge base    Interventions:  - Provide teaching at level of understanding  - Provide teaching via preferred learning methods  Outcome: Progressing     Problem: CARDIOVASCULAR - ADULT  Goal: Maintains optimal cardiac output and hemodynamic stability  Description: INTERVENTIONS:  - Monitor I/O, vital signs and rhythm  - Monitor for S/S and trends of decreased cardiac output  - Administer and titrate ordered vasoactive medications to optimize hemodynamic stability  - Assess quality of pulses, skin color and temperature  - Assess for signs of decreased coronary artery perfusion  - Instruct patient to report change in severity of symptoms  Outcome: Progressing  Goal: Absence of cardiac dysrhythmias or at baseline rhythm  Description: INTERVENTIONS:  - Continuous cardiac monitoring, vital signs, obtain 12 lead EKG if ordered  - Administer antiarrhythmic and heart rate control medications as ordered  - Monitor electrolytes and administer replacement therapy as ordered  Outcome: Progressing     Problem: RESPIRATORY - ADULT  Goal: Achieves optimal ventilation and oxygenation  Description: INTERVENTIONS:  - Assess for changes in respiratory status  - Assess for changes in mentation and behavior  - Position to facilitate oxygenation and minimize respiratory effort  - Oxygen administered by appropriate delivery if ordered  - Initiate smoking cessation education as indicated  - Encourage broncho-pulmonary hygiene including cough, deep breathe, Incentive Spirometry  - Assess the need for suctioning and aspirate as needed  - Assess and instruct to report SOB or any respiratory difficulty  - Respiratory Therapy support as indicated  Outcome: Progressing     Problem: METABOLIC, FLUID AND ELECTROLYTES - ADULT  Goal: Electrolytes maintained within normal limits  Description: INTERVENTIONS:  - Monitor labs and assess patient for signs and symptoms of electrolyte imbalances  - Administer electrolyte replacement as ordered  - Monitor response to electrolyte replacements, including repeat lab results as appropriate  - Instruct patient on fluid and nutrition as appropriate  Outcome: Progressing  Goal: Fluid balance maintained  Description: INTERVENTIONS:  - Monitor labs   - Monitor I/O and WT  - Instruct patient on fluid and nutrition as appropriate  - Assess for signs & symptoms of volume excess or deficit  Outcome: Progressing  Goal: Glucose maintained within target range  Description: INTERVENTIONS:  - Monitor Blood Glucose as ordered  - Assess for signs and symptoms of hyperglycemia and hypoglycemia  - Administer ordered medications to maintain glucose within target range  - Assess nutritional intake and initiate nutrition service referral as needed  Outcome: Progressing     Problem: Nutrition/Hydration-ADULT  Goal: Nutrient/Hydration intake appropriate for improving, restoring or maintaining nutritional needs  Description: Monitor and assess patient's nutrition/hydration status for malnutrition   Collaborate with interdisciplinary team and initiate plan and interventions as ordered  Monitor patient's weight and dietary intake as ordered or per policy  Utilize nutrition screening tool and intervene as necessary  Determine patient's food preferences and provide high-protein, high-caloric foods as appropriate       INTERVENTIONS:  - Monitor oral intake, urinary output, labs, and treatment plans  - Assess nutrition and hydration status and recommend course of action  - Evaluate amount of meals eaten  - Assist patient with eating if necessary   - Allow adequate time for meals  - Recommend/ encourage appropriate diets, oral nutritional supplements, and vitamin/mineral supplements  - Order, calculate, and assess calorie counts as needed  - Recommend, monitor, and adjust tube feedings and TPN/PPN based on assessed needs  - Assess need for intravenous fluids  - Provide specific nutrition/hydration education as appropriate  - Include patient/family/caregiver in decisions related to nutrition  Outcome: Progressing     Problem: Prexisting or High Potential for Compromised Skin Integrity  Goal: Skin integrity is maintained or improved  Description: INTERVENTIONS:  - Identify patients at risk for skin breakdown  - Assess and monitor skin integrity  - Assess and monitor nutrition and hydration status  - Monitor labs   - Assess for incontinence   - Turn and reposition patient  - Assist with mobility/ambulation  - Relieve pressure over bony prominences  - Avoid friction and shearing  - Provide appropriate hygiene as needed including keeping skin clean and dry  - Evaluate need for skin moisturizer/barrier cream  - Collaborate with interdisciplinary team   - Patient/family teaching  - Consider wound care consult   Outcome: Progressing

## 2022-09-01 NOTE — ASSESSMENT & PLAN NOTE
· Patient stating he had run out of several medications at home, unable to refill due to financial difficulties  Apparently had to fix his car  · Case management on board  Will be provided 30 day supply of meds at no cost, patient reports that thereafter he will be able to afford his meds

## 2022-09-01 NOTE — ASSESSMENT & PLAN NOTE
Lab Results   Component Value Date    HGBA1C 10 3 (H) 07/06/2022       Recent Labs     08/31/22  1635 08/31/22  2053 09/01/22  0625 09/01/22  1145   POCGLU 190* 103 86 256*       Blood Sugar Average: Last 72 hrs:  · (P) 019 [id number] not controlled and with hyperglycemia on presentation, due to non compliance  · Resumed Humalog mix 45 units t i d  with meals  · Cont ISS

## 2022-09-01 NOTE — CASE MANAGEMENT
Case Management Assessment & Discharge Planning Note    Patient name Ramon Diaz  Location /-35 MRN [id number]  : 1945 Date 2022       Current Admission Date: 2022  Current Admission Diagnosis:Acute on chronic diastolic (congestive) heart failure   Patient Active Problem List    Diagnosis Date Noted    Frequent hospital admissions 2022    Medication management 2022    Chest pain, musculoskeletal 2022    Hypothermia 2022    Epididymitis, bilateral 06/15/2022    Acute cystitis without hematuria 06/15/2022    Acute respiratory failure with hypoxia ([address] ) 2022    Chronic left-sided low back pain without sciatica 2022    CKD (chronic kidney disease) stage 3, GFR 30-59 ml/min ([address] ) 2022    History of prostate cancer 2022    Adenocarcinoma of lung ([address] ) 2022    Fracture of navicular bone of left foot 2021    Chronic respiratory failure with hypoxia ([address] ) 04/15/2021    PAD (peripheral artery disease) ([address] ) 2021    DDD (degenerative disc disease), lumbar 2020    Anemia, iron deficiency 2020    Lung nodule 2020    Pulmonary hypertension ([address] ) 2019    JACQUELINE (obstructive sleep apnea) 2019    COPD with acute exacerbation ([address] ) 2019    Oxygen dependent 2018    Acute metabolic encephalopathy     RBBB 2018    Acute on chronic diastolic (congestive) heart failure 2018    CAD (coronary artery disease) 2018    Chronic diastolic heart failure ([address] ) 2018    Pleural effusion on right 10/31/2017    COPD, severe ([address] ) 2017    Diabetic neuropathy ([address] ) 2017    Essential hypertension 2016    Venous stasis dermatitis of both lower extremities 11/15/2016    Type 2 diabetes mellitus with hyperglycemia, with long-term current use of insulin ([address] ) 2016    COPD exacerbation ([address] ) 2016    HLD (hyperlipidemia) 01/20/2016      LOS (days): 3  Geometric Mean LOS (GMLOS) (days): 3 80  Days to GMLOS:1 2     OBJECTIVE:  PATIENT READMITTED [address]  of Unplanned Readmission Score: 63 68         Current admission status: Inpatient  Referral Reason: Medication Assistance    Preferred Pharmacy:   [facility], [address] [facility]  [address] [address]  Phone: [phone] Fax: [phone]    Primary Care Provider: Anne Finley MD    Primary Insurance: [facility]  Secondary Insurance:     ASSESSMENT:  [facility] Representative - Son   Primary Phone: [phone] (Mobile)               Advance Directives  Does patient have a [address]?: No  Was patient offered paperwork?: Yes (declined)  Does patient currently have a Health Care decision maker?: Yes, please see Health Care Proxy section  Does patient have Advance Directives?: No  Was patient offered paperwork?: Yes (declined)  Primary Contact: Michael Cast (son) [phone]         Readmission Root Cause  30 Day Readmission: Yes  Who directed you to return to the hospital?: Self  Did you understand whom to contact if you had questions or problems?: Yes  Did you get your prescriptions before you left the hospital?: No  Reason[de-identified] Not preferred pharmacy  Were you able to get your prescriptions filled when you left the hospital?: No  Reason[de-identified] Lack of funds  Did you take your medications as prescribed?: No  During previous admission, was a post-acute recommendation made?: Yes  What post-acute resources were offered?: [facility]  Patient was readmitted due to: CHF/SOB  Action Plan: IV diueresis with transition to PO    Patient Information  Admitted from[de-identified] Home  Mental Status: Alert  During Assessment patient was accompanied by: Not accompanied during assessment  Assessment information provided by[de-identified] Patient  Primary Caregiver: Self  Support Systems: [facility] of Residence: [address] [facility] do you live in?: [facility] entry access options  Select all that apply : Stairs  Number of steps to enter home  : 1  Do the steps have railings?: No  Type of Current Residence: 2 story home  Upon entering residence, is there a bedroom on the main floor (no further steps)?: Yes  Upon entering residence, is there a bathroom on the main floor (no further steps)?: Yes  In the last 12 months, was there a time when you were not able to pay the mortgage or rent on time?: No  In the last 12 months, how many places have you lived?: 1  In the last 12 months, was there a time when you did not have a steady place to sleep or slept in a shelter (including now)?: No  Homeless/housing insecurity resource given?: N/A  Living Arrangements: Lives w/ Son  Is patient a ?: No    Activities of Daily Living Prior to Admission  Functional Status: Independent  Completes ADLs independently?: Yes  Ambulates independently?: Yes  Does patient use assisted devices?: Yes  Assisted Devices (DME) used: Portable Oxygen tanks, Home Oxygen concentrator  DME Company Name (respiratory supplies):  [employer]  O2 Rate(s): 3  Does patient currently own DME?: Yes  What DME does the patient currently own?: Noe Saenz, Other (Comment) (2 [address])  Does patient have a history of Outpatient Therapy (PT/OT)?: Yes  Does the patient have a history of Short-Term Rehab?: Yes [facility])  Does patient have a history of HHC?: Yes (ALEJANDRA SALGUERO)  Does patient currently have [facility]?: No         Patient Information Continued  Income Source: Pension/senior care (Patient is a retired )  Does patient have prescription coverage?: Yes  Within the past 12 months, you worried that your food would run out before you got the money to buy more : Never true  Within the past 12 months, the food you bought just didn't last and you didn't have money to get more : Never true  Food insecurity resource given?: N/A  Does patient receive dialysis treatments?: No  Does patient have a history of substance abuse?: No  Does patient have a history of Mental Health Diagnosis?: No         Means of Transportation  Means of Transport to Appts[de-identified] Drives Self  In the past 12 months, has lack of transportation kept you from medical appointments or from getting medications?: No  In the past 12 months, has lack of transportation kept you from meetings, work, or from getting things needed for daily living?: No  Was application for public transport provided?: N/A        DISCHARGE DETAILS:    Discharge planning discussed with[de-identified] Patient  Freedom of Choice: Yes     CM contacted family/caregiver?: No- see comments (Patient declined)  Were Treatment Team discharge recommendations reviewed with patient/caregiver?:  (Therapy recs pending)          Contacts  Patient Contacts: Reagan Dick  Relationship to Patient[de-identified] Family  Contact Method: Phone  Phone Number: [phone]  Reason/Outcome: Continuity of Care, Emergency Contact                   Would you like to participate in our [address] service program?  : Yes                     Patient lives with son in a [address] with 1STE, bedroom and bathroom access on main level  Patient is independent with ADLs  Patient is readmission, reports noncompliance with medications as he was unable to pay for them due to his car breaking down unexpectedly  Patient stated he doesn't have trouble paying for his medications normally  Patient with multiple ER visits/hospitalizations recently  Per last CM note, [facility] unable to accept due to history of noncompliance  CM sent message to request outpatient CM      CM reviewed d/c planning process including the following: identifying help at home, patient preference for d/c planning needs, Discharge Lounge, [facility] Meds to Bed program, availability of treatment team to discuss questions or concerns patient and/or family may have regarding understanding medications and recognizing signs and symptoms once discharged  CM also encouraged patient to follow up with all recommended appointments after discharge  Patient advised of importance for patient and family to participate in managing patients medical well being

## 2022-09-01 NOTE — ASSESSMENT & PLAN NOTE
· CXR appears with increased size of right pleural effusion, has required thoracentesis previously  · Patient currently comfortable on room air, transitioned to oral diuretics today

## 2022-09-01 NOTE — ASSESSMENT & PLAN NOTE
· Per pt chronically on 2L prn  · Continue supplemental oxygen as needed to maintain SaO2 greater than 88%  · Incentive spirometry, pulmonary toileting

## 2022-09-02 VITALS
WEIGHT: 235.52 LBS | OXYGEN SATURATION: 98 % | HEIGHT: 72 IN | RESPIRATION RATE: 18 BRPM | SYSTOLIC BLOOD PRESSURE: 143 MMHG | HEART RATE: 72 BPM | BODY MASS INDEX: 31.9 KG/M2 | DIASTOLIC BLOOD PRESSURE: 69 MMHG | TEMPERATURE: 97.2 F

## 2022-09-02 DIAGNOSIS — Z71.89 COMPLEX CARE COORDINATION: Primary | ICD-10-CM

## 2022-09-02 LAB
ANION GAP SERPL CALCULATED.3IONS-SCNC: 4 MMOL/L (ref 4–13)
BUN SERPL-MCNC: 37 MG/DL (ref 5–25)
CALCIUM SERPL-MCNC: 8.8 MG/DL (ref 8.3–10.1)
CHLORIDE SERPL-SCNC: 100 MMOL/L (ref 96–108)
CO2 SERPL-SCNC: 35 MMOL/L (ref 21–32)
CREAT SERPL-MCNC: 1.74 MG/DL (ref 0.6–1.3)
DME PARACHUTE DELIVERY DATE REQUESTED: NORMAL
DME PARACHUTE ITEM DESCRIPTION: NORMAL
DME PARACHUTE ORDER STATUS: NORMAL
DME PARACHUTE SUPPLIER NAME: NORMAL
DME PARACHUTE SUPPLIER PHONE: NORMAL
GFR SERPL CREATININE-BSD FRML MDRD: 37 ML/MIN/1.73SQ M
GLUCOSE SERPL-MCNC: 136 MG/DL (ref 65–140)
GLUCOSE SERPL-MCNC: 141 MG/DL (ref 65–140)
GLUCOSE SERPL-MCNC: 242 MG/DL (ref 65–140)
POTASSIUM SERPL-SCNC: 4.5 MMOL/L (ref 3.5–5.3)
SODIUM SERPL-SCNC: 139 MMOL/L (ref 135–147)

## 2022-09-02 PROCEDURE — 82948 REAGENT STRIP/BLOOD GLUCOSE: CPT

## 2022-09-02 PROCEDURE — 99239 HOSP IP/OBS DSCHRG MGMT >30: CPT | Performed by: INTERNAL MEDICINE

## 2022-09-02 PROCEDURE — 80048 BASIC METABOLIC PNL TOTAL CA: CPT | Performed by: INTERNAL MEDICINE

## 2022-09-02 RX ORDER — FUROSEMIDE 40 MG/1
40 TABLET ORAL DAILY
Qty: 30 TABLET | Refills: 0 | Status: ON HOLD | OUTPATIENT
Start: 2022-09-02 | End: 2022-10-23

## 2022-09-02 RX ORDER — LOSARTAN POTASSIUM 50 MG/1
50 TABLET ORAL DAILY
Qty: 30 TABLET | Refills: 0 | Status: SHIPPED | OUTPATIENT
Start: 2022-09-02 | End: 2022-10-15

## 2022-09-02 RX ORDER — POTASSIUM CHLORIDE 20 MEQ/1
20 TABLET, EXTENDED RELEASE ORAL DAILY
Qty: 30 TABLET | Refills: 0 | Status: ON HOLD | OUTPATIENT
Start: 2022-09-02 | End: 2022-10-23

## 2022-09-02 RX ADMIN — CARVEDILOL 6.25 MG: 6.25 TABLET, FILM COATED ORAL at 08:54

## 2022-09-02 RX ADMIN — HEPARIN SODIUM 5000 UNITS: 5000 INJECTION INTRAVENOUS; SUBCUTANEOUS at 06:10

## 2022-09-02 RX ADMIN — ASPIRIN 81 MG: 81 TABLET, COATED ORAL at 08:52

## 2022-09-02 RX ADMIN — INSULIN LISPRO 2 UNITS: 100 INJECTION, SOLUTION INTRAVENOUS; SUBCUTANEOUS at 12:16

## 2022-09-02 RX ADMIN — CYCLOBENZAPRINE HYDROCHLORIDE 10 MG: 10 TABLET, FILM COATED ORAL at 08:52

## 2022-09-02 RX ADMIN — UMECLIDINIUM 1 PUFF: 62.5 AEROSOL, POWDER ORAL at 08:53

## 2022-09-02 RX ADMIN — HEPARIN SODIUM 5000 UNITS: 5000 INJECTION INTRAVENOUS; SUBCUTANEOUS at 13:15

## 2022-09-02 RX ADMIN — INSULIN ASPART 40 UNITS: 100 INJECTION, SUSPENSION SUBCUTANEOUS at 12:16

## 2022-09-02 RX ADMIN — FUROSEMIDE 40 MG: 40 TABLET ORAL at 08:52

## 2022-09-02 RX ADMIN — INSULIN ASPART 40 UNITS: 100 INJECTION, SUSPENSION SUBCUTANEOUS at 08:52

## 2022-09-02 RX ADMIN — GABAPENTIN 100 MG: 100 CAPSULE ORAL at 08:52

## 2022-09-02 RX ADMIN — FLUTICASONE FUROATE AND VILANTEROL TRIFENATATE 1 PUFF: 100; 25 POWDER RESPIRATORY (INHALATION) at 08:53

## 2022-09-02 NOTE — ASSESSMENT & PLAN NOTE
Wt Readings from Last 3 Encounters:   09/02/22 107 kg (235 lb 8 3 oz)   08/25/22 103 kg (228 lb)   08/21/22 99 8 kg (220 lb)     · Patient noting increasing shortness of breath x3 days along with orthopnea and leg swelling  CXR with evidence of pulmonary vascular congestion/increased size of right pleural effusion  · Exacerbation secondary to non compliance  · Transitioned to po lasix to continue at chronic dose  Re-iterated multiple times importance of compliance to medication regimen   Also educated on low salt diet and monitoring daily wts

## 2022-09-02 NOTE — DISCHARGE SUMMARY
[facility]  Discharge- Segundo Elam Sr  1945, 78 y o  male MRN: [id number]  Unit/Bed#: -01 Encounter: [id number]  Primary Care Provider: Yanira Thomas MD   Date and time admitted to hospital: 8/29/2022  5:53 PM    * Acute on chronic diastolic (congestive) heart failure  Assessment & Plan  Wt Readings from Last 3 Encounters:   09/02/22 107 kg (235 lb 8 3 oz)   08/25/22 103 kg (228 lb)   08/21/22 99 8 kg (220 lb)     · Patient noting increasing shortness of breath x3 days along with orthopnea and leg swelling  CXR with evidence of pulmonary vascular congestion/increased size of right pleural effusion  · Exacerbation secondary to non compliance  · Transitioned to po lasix to continue at chronic dose  Re-iterated multiple times importance of compliance to medication regimen  Also educated on low salt diet and monitoring daily wts        Medication management  Assessment & Plan  · Patient stating he had run out of several medications at home, unable to refill due to financial difficulties  Apparently had to fix his car  · Case management on board  Pt was provided 30 day supply of meds at no cost, patient reports that thereafter he will be able to afford his meds       CKD (chronic kidney disease) stage 3, GFR 30-59 ml/min [facility])  Assessment & Plan  Lab Results   Component Value Date    EGFR 37 09/02/2022    EGFR 43 09/01/2022    EGFR 41 08/31/2022    CREATININE 1 74 (H) 09/02/2022    CREATININE 1 54 (H) 09/01/2022    CREATININE 1 60 (H) 08/31/2022   · Creatinine w/in baseline range  · Resume back on losartan given chronic proteinuria  · Repeat bmp in 1 week as outpt     Chronic respiratory failure with hypoxia (HCC)  Assessment & Plan  · Per pt chronically on 2L prn  · Remains at baseline    Essential hypertension  Assessment & Plan  · Continue home antihypertensives      Pleural effusion on right  Assessment & Plan  · CXR appears with increased size of right pleural effusion, has required thoracentesis previously  · Patient currently comfortable on room air, s/p IV diuresis and transition to oral diuretics      COPD, severe ([address] )  Assessment & Plan  · Not in acute exacerbation, continue home inhalers    Type 2 diabetes mellitus with hyperglycemia, with long-term current use of insulin [facility])  Assessment & Plan  Lab Results   Component Value Date    HGBA1C 10 3 (H) 07/06/2022       Recent Labs     09/01/22  1145 09/01/22  1656 09/01/22  2043 09/02/22  0647   POCGLU 256* 128 185* 141*       Blood Sugar Average: Last 72 hrs:  · Poorly controlled secondary to  noncompliance  · Cont Humalog mix 45 units t i d  with meals  · Encouraged glucose monitoring at home and compliance with diabetic diet        Discharging Physician / Practitioner: Lindsay Garza DO  PCP: Shirley Vega MD  Admission Date:   Admission Orders (From admission, onward)     Ordered        08/29/22 2150  INPATIENT ADMISSION  Once                      Discharge Date: 09/02/22    Medical Problems             Resolved Problems  Date Reviewed: 9/2/2022   None                 Consultations During Hospital Stay:  ·     Procedures Performed:   ·     Significant Findings / Test Results:   · See above    Incidental Findings:   ·     Test Results Pending at Discharge (will require follow up):   ·      Outpatient Tests Requested:  · BMP in 1 week    Complications:  None    Reason for Admission: SOB    Hospital Course:     HPI: Gayle Medina  is a 78 y o  male who presents for worsening shortness of breath over the past 3 days  Patient's past medical history as below particular significant for chronic diastolic heart failure, COPD, prior history of lung cancer s/p resection and radiation, recurrent right pleural effusion, and uncontrolled type 2 diabetes on intensive insulin therapy    Of note patient was briefly hospitalized here the evening of 08/21/2022 with chest pain and additionally for medication management however left AMA due to prolonged ED hold time      He states after he left he attempted to refill several was medications but states he was unable to as many orders were cancelled, though of note appears he followed up with his PCP after discharge  For the past 3 days, however, he noted increased shortness of breath particularly with exertion associated with some intermittent substernal chest pain, intermittent cough nonproductive of sputum, orthopnea, increased lower extremity edema  He denies any fevers/chills, apparent weight gain, wheezing, or dizziness/lightheadedness  During ED evaluation here concern was noted for acute on chronic heart failure exacerbation for which he was provided IV Lasix and is admitted for further management  Please see above list of diagnoses and related plan for additional information  Condition at Discharge: stable     Discharge Day Visit / Exam:     Subjective:  Pt seen and examined at bedside  Denies dyspnea  Off oxygen  No acute events overnight  Vitals: Blood Pressure: 144/68 (09/02/22 0733)  Pulse: 72 (09/02/22 0733)  Temperature: (!) 97 2 °F (36 2 °C) (09/02/22 0733)  Temp Source: Oral (09/02/22 0733)  Respirations: 18 (09/01/22 2214)  Height: 6' (182 9 cm) (08/29/22 2321)  Weight - Scale: 107 kg (235 lb 8 3 oz) (09/02/22 0550)  SpO2: 98 % (09/02/22 0733)  Exam:   Physical Exam  Cardiovascular:      Rate and Rhythm: Normal rate and regular rhythm  Pulses: Normal pulses  Heart sounds: Normal heart sounds  No murmur heard  Pulmonary:      Effort: Pulmonary effort is normal  No respiratory distress  Breath sounds: Normal breath sounds  No wheezing or rales  Abdominal:      General: Bowel sounds are normal  There is no distension  Palpations: Abdomen is soft  Tenderness: There is no abdominal tenderness  There is no guarding  Musculoskeletal:         General: Normal range of motion        Cervical back: Normal range of motion and neck supple  Comments: Trace minimal edema   Skin:     General: Skin is warm and dry  Comments: Multiple chronic LE superficial wounds   Neurological:      General: No focal deficit present  Mental Status: He is alert and oriented to person, place, and time  Mental status is at baseline  Cranial Nerves: No cranial nerve deficit  Motor: No weakness  Discussion with Family: Called his son no answer  Thus called his sister-in-law and discussed plan of care/discharge at length    Discharge instructions/Information to patient and family:   See after visit summary for information provided to patient and family  Provisions for Follow-Up Care:  See after visit summary for information related to follow-up care and any pertinent home health orders  Disposition:     Home    For Discharges to [facility] SNF:   · Not Applicable to this Patient - Not Applicable to this Patient    Planned Readmission: No     Discharge Statement:  I spent 35 minutes discharging the patient  This time was spent on the day of discharge  I had direct contact with the patient on the day of discharge  Greater than 50% of the total time was spent examining patient, answering all patient questions, arranging and discussing plan of care with patient as well as directly providing post-discharge instructions  Additional time then spent on discharge activities  Discharge Medications:  See after visit summary for reconciled discharge medications provided to patient and family        ** Please Note: This note has been constructed using a voice recognition system **

## 2022-09-02 NOTE — ASSESSMENT & PLAN NOTE
Lab Results   Component Value Date    HGBA1C 10 3 (H) 07/06/2022       Recent Labs     09/01/22  1145 09/01/22  1656 09/01/22 2043 09/02/22  0647   POCGLU 256* 128 185* 141*       Blood Sugar Average: Last 72 hrs:  · Poorly controlled secondary to  noncompliance  · Cont Humalog mix 45 units t i d  with meals  · Encouraged glucose monitoring at home and compliance with diabetic diet

## 2022-09-02 NOTE — ASSESSMENT & PLAN NOTE
Lab Results   Component Value Date    EGFR 37 09/02/2022    EGFR 43 09/01/2022    EGFR 41 08/31/2022    CREATININE 1 74 (H) 09/02/2022    CREATININE 1 54 (H) 09/01/2022    CREATININE 1 60 (H) 08/31/2022   · Creatinine w/in baseline range  · Resume back on losartan given chronic proteinuria  · Repeat bmp in 1 week as outpt

## 2022-09-02 NOTE — CASE MANAGEMENT
Case Management Discharge Planning Note    Patient name Maria Antonia Vazquez  Location /-90 MRN [id number]  : 1945 Date 2022       Current Admission Date: 2022  Current Admission Diagnosis:Acute on chronic diastolic (congestive) heart failure   Patient Active Problem List    Diagnosis Date Noted    Frequent hospital admissions 2022    Medication management 2022    Chest pain, musculoskeletal 2022    Hypothermia 2022    Epididymitis, bilateral 06/15/2022    Acute cystitis without hematuria 06/15/2022    Acute respiratory failure with hypoxia ([address] ) 2022    Chronic left-sided low back pain without sciatica 2022    CKD (chronic kidney disease) stage 3, GFR 30-59 ml/min ([address] ) 2022    History of prostate cancer 2022    Adenocarcinoma of [facility] ) 2022    Fracture of navicular bone of left foot 2021    Chronic respiratory failure with hypoxia ([address] ) 04/15/2021    PAD (peripheral artery disease) ([facility] ) 2021    DDD (degenerative disc disease), lumbar 2020    Anemia, iron deficiency 2020    Lung nodule 2020    Pulmonary hypertension ([address] ) 2019    JACQUELINE (obstructive sleep apnea) 2019    COPD with acute exacerbation ([address] ) 2019    Oxygen dependent 2018    Acute metabolic encephalopathy     RBBB 2018    Acute on chronic diastolic (congestive) heart failure 2018    CAD (coronary artery disease) 2018    Chronic diastolic heart failure ([address] ) 2018    Pleural effusion on right 10/31/2017    COPD, severe ([address] ) 2017    Diabetic neuropathy ([address] ) 2017    Essential hypertension 2016    Venous stasis dermatitis of both lower extremities 11/15/2016    Type 2 diabetes mellitus with hyperglycemia, with long-term current use of insulin ([address] ) 2016    COPD exacerbation ([address] ) 2016    HLD (hyperlipidemia) 2016 LOS (days): 4  Geometric Mean LOS (GMLOS) (days): 3 80  Days to GMLOS:0 3     OBJECTIVE:  Risk of Unplanned Readmission Score: 64 59         Current admission status: Inpatient   Preferred Pharmacy:   [address] [place] [address], [address] [place] [address] [address] [place], [place] [address]an  [address] [place] [address]  Phone: [phone] Fax: [phone] [address] 18 [facility] 91 [facility]  Phone: [phone] Fax: [phone]    Primary Care Provider: Walter Polk MD    Primary Insurance: [facility]  Secondary Insurance:     DISCHARGE DETAILS:    Discharge planning discussed with[de-identified] Patient  Freedom of Choice: Yes                                  Other Referral/Resources/Interventions Provided:  Referral Comments: Patient reports he cannot afford his medications due to having an unexpected bill for his car  Per financial eligibility team, patient qualifies for 100% discount, patient made aware  Medications sent to [facility], total $34 03  Financial assistance form for medication faxed to [facility]  IMM Given (Date):: 09/02/22  IMM Given to[de-identified] Patient  IMM reviewed with patient, patient agrees with discharge determination

## 2022-09-02 NOTE — ASSESSMENT & PLAN NOTE
· CXR appears with increased size of right pleural effusion, has required thoracentesis previously  · Patient currently comfortable on room air, s/p IV diuresis and transition to oral diuretics

## 2022-09-02 NOTE — PHYSICAL THERAPY NOTE
Physical Therapy Screen       09/02/22 1100   PT Last Visit   PT Visit Date 09/02/22   Note Type   Note type Screen   Additional Comments PT orders recieved, chart reviewed  Pt is functionaing at Mod I level with RW for mobility at this time  Spoke to pt and reports no concerns regarding DC home  Reports he does not have a RW and would like one    DC from PT caseload at this time, recommend DC home with RW      Elizabeth Daly, PT, DPT

## 2022-09-02 NOTE — CASE MANAGEMENT
Case Management Discharge Planning Note    Patient name Chelsey Martin  Location /-08 MRN [id number]  : 1945 Date 2022       Current Admission Date: 2022  Current Admission Diagnosis:Acute on chronic diastolic (congestive) heart failure   Patient Active Problem List    Diagnosis Date Noted    Frequent hospital admissions 2022    Medication management 2022    Chest pain, musculoskeletal 2022    Hypothermia 2022    Epididymitis, bilateral 06/15/2022    Acute cystitis without hematuria 06/15/2022    Acute respiratory failure with hypoxia ([address] ) 2022    Chronic left-sided low back pain without sciatica 2022    CKD (chronic kidney disease) stage 3, GFR 30-59 ml/min ([address] ) 2022    History of prostate cancer 2022    Adenocarcinoma of lung ([facility] ) 2022    Fracture of navicular bone of left foot 2021    Chronic respiratory failure with hypoxia ([address] ) 04/15/2021    PAD (peripheral artery disease) ([facility] ) 2021    DDD (degenerative disc disease), lumbar 2020    Anemia, iron deficiency 2020    Lung nodule 2020    Pulmonary hypertension ([address] ) 2019    JACQUELINE (obstructive sleep apnea) 2019    COPD with acute exacerbation ([address] ) 2019    Oxygen dependent 2018    Acute metabolic encephalopathy     RBBB 2018    Acute on chronic diastolic (congestive) heart failure 2018    CAD (coronary artery disease) 2018    Chronic diastolic heart failure ([address] ) 2018    Pleural effusion on right 10/31/2017    COPD, severe ([address] ) 2017    Diabetic neuropathy ([address] ) 2017    Essential hypertension 2016    Venous stasis dermatitis of both lower extremities 11/15/2016    Type 2 diabetes mellitus with hyperglycemia, with long-term current use of insulin ([address] ) 2016    COPD exacerbation ([address] ) 2016    HLD (hyperlipidemia) 2016 LOS (days): 4  Geometric Mean LOS (GMLOS) (days): 3 80  Days to GMLOS:0 1     OBJECTIVE:  Risk of Unplanned Readmission Score: 64 66         Current admission status: Inpatient   Preferred Pharmacy:   382 [facility], [address] [place] [address] [address] [place] [address] [place]  [address] [place] [address]  Phone: [phone] Fax: [phone] [facility] 91 [facility]  Phone: [phone] Fax: [phone]    Primary Care Provider: Kirstin Sanchez MD    Primary Insurance: [facility]  Secondary Insurance:     DISCHARGE DETAILS:    Discharge planning discussed with[de-identified] Patient  Freedom of Choice: Yes        Were Treatment Team discharge recommendations reviewed with patient/caregiver?: Yes  Did patient/caregiver verbalize understanding of patient care needs?: Yes  Were patient/caregiver advised of the risks associated with not following Treatment Team discharge recommendations?: Yes              DME Referral Provided  Referral made for DME?: Yes  DME referral completed for the following items[de-identified] Amy Deniz  DME Supplier Name[de-identified] [employer]    Other Referral/Resources/Interventions Provided:  Referral Comments: Patient cleared for discharge  Per PT recommendation, pt needs RW  CM discussed recommendation for RW with patient, agreeable for CM to order, order placed with [employer] via [place]  Patient's sister unable to get transportation for patient, patient requesting ride home  Patient is now on room air, only uses o2 prn  CM awaiting RW delivery then will arrange ride home for patient, shuttle vs lyft  Treatment Team Recommendation: Home  Discharge Destination Plan[de-identified] Home  Transport at Discharge : Other (Comment) (Lyft vs shuttle)                      Update 9/2/22 @ 1400: RW delivered  Shuttle able to take patient home, will meet patient in [place]   PCA Kinza to bring patient to [place]

## 2022-09-02 NOTE — ASSESSMENT & PLAN NOTE
· Patient stating he had run out of several medications at home, unable to refill due to financial difficulties  Apparently had to fix his car  · Case management on board  Pt was provided 30 day supply of meds at no cost, patient reports that thereafter he will be able to afford his meds

## 2022-09-06 ENCOUNTER — PATIENT OUTREACH (OUTPATIENT)
Dept: FAMILY MEDICINE CLINIC | Facility: CLINIC | Age: 77
End: 2022-09-06

## 2022-09-06 NOTE — PROGRESS NOTES
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

## 2022-09-06 NOTE — PROGRESS NOTES
Referral received to outreach pt for SW follow up, SW reviewed chart  Referral notes pt has limited social support, however, pt reported no concerns  SW sent inbasket to [address]  [facility] inquiring about referral       Per Cierra, pt needs medication management and is having transportation difficulties  He does not have a phone and had provided phone number for his son  SW called pt's son's number  [phone]  Answering machine picked up and there was a request asking no messages be left  SW to follow

## 2022-09-06 NOTE — UTILIZATION REVIEW
Notification of Discharge   This is a Notification of Discharge from our facility [address]  Please be advised that this patient has been discharge from our facility  Below you will find the admission and discharge date and time including the patients disposition  UTILIZATION REVIEW CONTACT:  Dinora Bojorquez  Utilization   Network Utilization Review Department  Phone: [phone] x carefully listen to the prompts  All voicemails are confidential   Email: [email or web address]     PHYSICIAN ADVISORY SERVICES:  FOR [id number] REVIEW - MEDICAL NECESSITY DENIAL  Phone: [phone]  Fax: [phone]  Email: [email or web address]     PRESENTATION DATE: 8/29/2022  5:53 PM  OBERVATION ADMISSION DATE:   INPATIENT ADMISSION DATE: 8/29/22  9:50 PM   DISCHARGE DATE: 9/2/2022  2:52 PM  DISPOSITION: Home/Self Care Home/Self Care      IMPORTANT INFORMATION:  Send all requests for admission clinical reviews, approved or denied determinations and any other requests to dedicated fax number below belonging to the campus where the patient is receiving treatment   List of dedicated fax numbers:  [address] DENIALS (Administrative/Medical Necessity) [phone]   [address] (Maternity/NICU/Pediatrics) [phone]   [facility] [phone]   [facility] [phone]   [address] [place] [address] [phone]   2000 [facility] [address] [place] [address],[address] [phone]   [facility]  [phone]   2205 [facility] [address] [facility]  2401 [facility] 1000 [facility] [phone]

## 2022-09-07 ENCOUNTER — TRANSITIONAL CARE MANAGEMENT (OUTPATIENT)
Dept: FAMILY MEDICINE CLINIC | Facility: CLINIC | Age: 77
End: 2022-09-07

## 2022-09-09 LAB
DME PARACHUTE DELIVERY DATE ACTUAL: NORMAL
DME PARACHUTE DELIVERY DATE REQUESTED: NORMAL
DME PARACHUTE ITEM DESCRIPTION: NORMAL
DME PARACHUTE ORDER STATUS: NORMAL
DME PARACHUTE SUPPLIER NAME: NORMAL
DME PARACHUTE SUPPLIER PHONE: NORMAL

## 2022-09-14 ENCOUNTER — PATIENT OUTREACH (OUTPATIENT)
Dept: FAMILY MEDICINE CLINIC | Facility: CLINIC | Age: 77
End: 2022-09-14

## 2022-09-14 NOTE — PROGRESS NOTES
Chart was reviewed and this OPCM RN attempted to call patient and discuss the complex case management program  There was no answer and message #2 was left with a request for a call back  An Unable to Reach Letter was mailed  OPCM RN to follow up in 2 weeks

## 2022-09-14 NOTE — LETTER
Date: 09/14/22  Dear Critsina Kilgore,   My name is Juan Manuel Del Castillo; I am a registered nurse care manager working with Dr Jaime Steele office  I have not been able to reach you and would like to set a time that I can talk with you over the phone  My work is to help patients that have complex medical conditions get the care they need  This includes patients who may have been in the hospital or emergency room  Please call me with any questions you may have  I look forward to speaking with you    Sincerely,  Juan Manuel Del Castillo RN, [place]  [phone]  Outpatient Care Manager  Copy:  (Dr Namita Grijalva)

## 2022-09-17 ENCOUNTER — APPOINTMENT (OUTPATIENT)
Dept: RADIOLOGY | Facility: HOSPITAL | Age: 77
End: 2022-09-17
Payer: COMMERCIAL

## 2022-09-17 ENCOUNTER — HOSPITAL ENCOUNTER (EMERGENCY)
Facility: HOSPITAL | Age: 77
Discharge: HOME | End: 2022-09-17
Attending: EMERGENCY MEDICINE
Payer: COMMERCIAL

## 2022-09-17 ENCOUNTER — APPOINTMENT (EMERGENCY)
Dept: RADIOLOGY | Facility: HOSPITAL | Age: 77
End: 2022-09-17
Payer: COMMERCIAL

## 2022-09-17 VITALS
BODY MASS INDEX: 30.11 KG/M2 | TEMPERATURE: 97.6 F | SYSTOLIC BLOOD PRESSURE: 110 MMHG | DIASTOLIC BLOOD PRESSURE: 54 MMHG | WEIGHT: 222 LBS | HEART RATE: 82 BPM | OXYGEN SATURATION: 96 % | RESPIRATION RATE: 18 BRPM

## 2022-09-17 DIAGNOSIS — R07.89 CHEST WALL PAIN: ICD-10-CM

## 2022-09-17 DIAGNOSIS — F41.9 ANXIETY: ICD-10-CM

## 2022-09-17 DIAGNOSIS — R07.89 OTHER CHEST PAIN: Primary | ICD-10-CM

## 2022-09-17 LAB
2HR DELTA HS TROPONIN: 0 NG/L
ALBUMIN SERPL BCP-MCNC: 2.8 G/DL (ref 3.5–5)
ALP SERPL-CCNC: 77 U/L (ref 46–116)
ALT SERPL W P-5'-P-CCNC: 15 U/L (ref 12–78)
ANION GAP SERPL CALCULATED.3IONS-SCNC: 4 MMOL/L (ref 4–13)
APTT PPP: 25 SECONDS (ref 23–37)
AST SERPL W P-5'-P-CCNC: 8 U/L (ref 5–45)
BASE EX.OXY STD BLDV CALC-SCNC: 56.1 % (ref 60–80)
BASE EXCESS BLDV CALC-SCNC: -6.2 MMOL/L
BASOPHILS # BLD AUTO: 0.03 THOUSANDS/ΜL (ref 0–0.1)
BASOPHILS NFR BLD AUTO: 0 % (ref 0–1)
BILIRUB SERPL-MCNC: 0.32 MG/DL (ref 0.2–1)
BUN SERPL-MCNC: 51 MG/DL (ref 5–25)
CALCIUM ALBUM COR SERPL-MCNC: 9.2 MG/DL (ref 8.3–10.1)
CALCIUM SERPL-MCNC: 8.2 MG/DL (ref 8.3–10.1)
CARDIAC TROPONIN I PNL SERPL HS: 12 NG/L
CARDIAC TROPONIN I PNL SERPL HS: 12 NG/L
CHLORIDE SERPL-SCNC: 107 MMOL/L (ref 96–108)
CO2 SERPL-SCNC: 25 MMOL/L (ref 21–32)
CREAT SERPL-MCNC: 2.31 MG/DL (ref 0.6–1.3)
EOSINOPHIL # BLD AUTO: 0.15 THOUSAND/ΜL (ref 0–0.61)
EOSINOPHIL NFR BLD AUTO: 1 % (ref 0–6)
ERYTHROCYTE [DISTWIDTH] IN BLOOD BY AUTOMATED COUNT: 16 % (ref 11.6–15.1)
FLUAV RNA RESP QL NAA+PROBE: NEGATIVE
FLUBV RNA RESP QL NAA+PROBE: NEGATIVE
GFR SERPL CREATININE-BSD FRML MDRD: 26 ML/MIN/1.73SQ M
GLUCOSE SERPL-MCNC: 107 MG/DL (ref 65–140)
HCO3 BLDV-SCNC: 22.2 MMOL/L (ref 24–30)
HCT VFR BLD AUTO: 41.5 % (ref 36.5–49.3)
HGB BLD-MCNC: 13.3 G/DL (ref 12–17)
IMM GRANULOCYTES # BLD AUTO: 0.06 THOUSAND/UL (ref 0–0.2)
IMM GRANULOCYTES NFR BLD AUTO: 1 % (ref 0–2)
INR PPP: 0.99 (ref 0.84–1.19)
LYMPHOCYTES # BLD AUTO: 1.31 THOUSANDS/ΜL (ref 0.6–4.47)
LYMPHOCYTES NFR BLD AUTO: 12 % (ref 14–44)
MCH RBC QN AUTO: 26.9 PG (ref 26.8–34.3)
MCHC RBC AUTO-ENTMCNC: 32 G/DL (ref 31.4–37.4)
MCV RBC AUTO: 84 FL (ref 82–98)
MONOCYTES # BLD AUTO: 0.89 THOUSAND/ΜL (ref 0.17–1.22)
MONOCYTES NFR BLD AUTO: 8 % (ref 4–12)
NEUTROPHILS # BLD AUTO: 8.29 THOUSANDS/ΜL (ref 1.85–7.62)
NEUTS SEG NFR BLD AUTO: 78 % (ref 43–75)
NRBC BLD AUTO-RTO: 0 /100 WBCS
NT-PROBNP SERPL-MCNC: 1045 PG/ML
O2 CT BLDV-SCNC: 11.2 ML/DL
PCO2 BLDV: 55.5 MM HG (ref 42–50)
PH BLDV: 7.22 [PH] (ref 7.3–7.4)
PLATELET # BLD AUTO: 114 THOUSANDS/UL (ref 149–390)
PMV BLD AUTO: 10.3 FL (ref 8.9–12.7)
PO2 BLDV: 33.1 MM HG (ref 35–45)
POTASSIUM SERPL-SCNC: 4.7 MMOL/L (ref 3.5–5.3)
PROT SERPL-MCNC: 7.5 G/DL (ref 6.4–8.4)
PROTHROMBIN TIME: 13.3 SECONDS (ref 11.6–14.5)
RBC # BLD AUTO: 4.95 MILLION/UL (ref 3.88–5.62)
RSV RNA RESP QL NAA+PROBE: NEGATIVE
SARS-COV-2 RNA RESP QL NAA+PROBE: NEGATIVE
SODIUM SERPL-SCNC: 136 MMOL/L (ref 135–147)
WBC # BLD AUTO: 10.73 THOUSAND/UL (ref 4.31–10.16)

## 2022-09-17 PROCEDURE — 99285 EMERGENCY DEPT VISIT HI MDM: CPT | Performed by: EMERGENCY MEDICINE

## 2022-09-17 PROCEDURE — 93005 ELECTROCARDIOGRAM TRACING: CPT

## 2022-09-17 PROCEDURE — 85610 PROTHROMBIN TIME: CPT | Performed by: EMERGENCY MEDICINE

## 2022-09-17 PROCEDURE — 0241U HB NFCT DS VIR RESP RNA 4 TRGT: CPT

## 2022-09-17 PROCEDURE — 84484 ASSAY OF TROPONIN QUANT: CPT | Performed by: EMERGENCY MEDICINE

## 2022-09-17 PROCEDURE — 71250 CT THORAX DX C-: CPT

## 2022-09-17 PROCEDURE — G1004 CDSM NDSC: HCPCS

## 2022-09-17 PROCEDURE — 82805 BLOOD GASES W/O2 SATURATION: CPT | Performed by: EMERGENCY MEDICINE

## 2022-09-17 PROCEDURE — 85730 THROMBOPLASTIN TIME PARTIAL: CPT | Performed by: EMERGENCY MEDICINE

## 2022-09-17 PROCEDURE — 71046 X-RAY EXAM CHEST 2 VIEWS: CPT

## 2022-09-17 PROCEDURE — 36415 COLL VENOUS BLD VENIPUNCTURE: CPT

## 2022-09-17 PROCEDURE — 83880 ASSAY OF NATRIURETIC PEPTIDE: CPT | Performed by: EMERGENCY MEDICINE

## 2022-09-17 PROCEDURE — 99285 EMERGENCY DEPT VISIT HI MDM: CPT

## 2022-09-17 PROCEDURE — 96374 THER/PROPH/DIAG INJ IV PUSH: CPT

## 2022-09-17 PROCEDURE — 96375 TX/PRO/DX INJ NEW DRUG ADDON: CPT

## 2022-09-17 PROCEDURE — 85025 COMPLETE CBC W/AUTO DIFF WBC: CPT | Performed by: EMERGENCY MEDICINE

## 2022-09-17 PROCEDURE — 80053 COMPREHEN METABOLIC PANEL: CPT | Performed by: EMERGENCY MEDICINE

## 2022-09-17 RX ORDER — ASPIRIN 81 MG/1
162 TABLET, CHEWABLE ORAL ONCE
Status: COMPLETED | OUTPATIENT
Start: 2022-09-17 | End: 2022-09-17

## 2022-09-17 RX ORDER — HYDROMORPHONE HCL/PF 1 MG/ML
0.5 SYRINGE (ML) INJECTION ONCE
Status: COMPLETED | OUTPATIENT
Start: 2022-09-17 | End: 2022-09-17

## 2022-09-17 RX ORDER — LIDOCAINE 50 MG/G
1 PATCH TOPICAL DAILY
Qty: 6 PATCH | Refills: 0 | Status: ON HOLD | OUTPATIENT
Start: 2022-09-17 | End: 2022-10-23

## 2022-09-17 RX ORDER — LIDOCAINE 50 MG/G
1 PATCH TOPICAL ONCE
Status: DISCONTINUED | OUTPATIENT
Start: 2022-09-17 | End: 2022-09-18 | Stop reason: HOSPADM

## 2022-09-17 RX ORDER — ONDANSETRON 2 MG/ML
4 INJECTION INTRAMUSCULAR; INTRAVENOUS ONCE
Status: COMPLETED | OUTPATIENT
Start: 2022-09-17 | End: 2022-09-17

## 2022-09-17 RX ADMIN — ASPIRIN 81 MG CHEWABLE TABLET 162 MG: 81 TABLET CHEWABLE at 20:22

## 2022-09-17 RX ADMIN — LIDOCAINE 5% 1 PATCH: 700 PATCH TOPICAL at 23:39

## 2022-09-17 RX ADMIN — ONDANSETRON 4 MG: 2 INJECTION INTRAMUSCULAR; INTRAVENOUS at 22:04

## 2022-09-17 RX ADMIN — HYDROMORPHONE HYDROCHLORIDE 0.5 MG: 1 INJECTION, SOLUTION INTRAMUSCULAR; INTRAVENOUS; SUBCUTANEOUS at 23:10

## 2022-09-17 NOTE — ED ATTENDING ATTESTATION
9/17/2022  I, Marisol Braswell MD, saw and evaluated the patient  I have discussed the patient with the resident/non-physician practitioner and agree with the resident's/non-physician practitioner's findings, Plan of Care, and MDM as documented in the resident's/non-physician practitioner's note, except where noted  All available labs and Radiology studies were reviewed  I was present for key portions of any procedure(s) performed by the resident/non-physician practitioner and I was immediately available to provide assistance  At this point I agree with the current assessment done in the Emergency Department  I have conducted an independent evaluation of this patient a history and physical is as follows:  Pt has bilateral chest pain since this evening which started at rest  Pt sates he was breathing heavy and then chest started to hurt Pt states this is similar to chest pain in past  no radiation of pain PE: alert some increased work of breathing heart reg lungs exp wheeze bilat upper lobes abd soft nontender ?  Hernia ext no edema chronic changes PE: alert nad heart reg lungs rales L side abd soft nontender ext chronic changes MDM: will do cardiac vu cxr  ED Course         Critical Care Time  Procedures

## 2022-09-18 LAB
ATRIAL RATE: 192 BPM
ATRIAL RATE: 202 BPM
QRS AXIS: 13 DEGREES
QRS AXIS: 8 DEGREES
QRSD INTERVAL: 142 MS
QRSD INTERVAL: 144 MS
QT INTERVAL: 400 MS
QT INTERVAL: 416 MS
QTC INTERVAL: 444 MS
QTC INTERVAL: 477 MS
T WAVE AXIS: 179 DEGREES
T WAVE AXIS: 180 DEGREES
VENTRICULAR RATE: 74 BPM
VENTRICULAR RATE: 79 BPM

## 2022-09-18 PROCEDURE — 93010 ELECTROCARDIOGRAM REPORT: CPT | Performed by: INTERNAL MEDICINE

## 2022-09-18 NOTE — DISCHARGE INSTRUCTIONS
Please call your doctor on Monday to have your follow-up appointment moved up to a sooner date  Please return to the emergency room with any new or worsening chest pain

## 2022-09-18 NOTE — ED PROVIDER NOTES
History  Chief Complaint   Patient presents with    Chest Pain     Pt c/o chest pain x years, c/o intermittent sob with hx copd  68-year-old male past medical history of CHF, CKD, chronic respiratory failure (on 2 L O2 chronically at home), known right-sided pleural effusion, diabetes presents to ED complaining of central chest pain since early this evening  Patient states that he was at home relaxing when he gradually developed chest pressure over his sternum and radiating to both right and left sides of his chest   He denies any radiation to his neck or back  Patient states that this pain feels very similar to the pain he gets when he has difficulty breathing    Patient states that he has been taking more frequent and deeper breaths since he feels like he is having trouble breathing  Patient states he has been compliant with all of his medications since last hospital discharge including his Lasix 40 mg  Patient states that he has been using his home oxygen at 2 L without any difficulty  Patient denies any fevers chills, no cough, no recent illnesses  He endorses nausea but no vomiting, endorses decreased p o  Intake secondary to lack of appetite which is a chronic finding he states has been going on for months  No dysuria hematuria no diarrhea or constipation  Prior to Admission Medications   Prescriptions Last Dose Informant Patient Reported?  Taking?   aspirin (ECOTRIN LOW STRENGTH) 81 mg EC tablet 9/17/2022 at Unknown time  No Yes   Sig: Take 1 tablet (81 mg total) by mouth daily   carvedilol (COREG) 6 25 mg tablet 9/17/2022 at Unknown time  No Yes   Sig: Take 1 tablet (6 25 mg total) by mouth 2 (two) times a day with meals   cyclobenzaprine (FLEXERIL) 10 mg tablet 9/17/2022 at Unknown time  No Yes   Sig: Take 1 tablet (10 mg total) by mouth 2 (two) times a day as needed for muscle spasms   ferrous sulfate 325 (65 Fe) mg tablet 9/17/2022 at Unknown time  No Yes   Sig: Take 1 tablet (325 mg total) by mouth daily with breakfast   fluticasone-umeclidinium-vilanterol (Trelegy Ellipta) 100-62 5-25 MCG/INH inhaler 9/17/2022 at Unknown time  No Yes   Sig: Inhale 1 puff daily Rinse mouth after use  furosemide (LASIX) 40 mg tablet 9/17/2022 at Unknown time  No Yes   Sig: Take 1 tablet (40 mg total) by mouth daily   gabapentin (NEURONTIN) 100 mg capsule 9/17/2022 at Unknown time  No Yes   Sig: Take 1 capsule (100 mg total) by mouth 3 (three) times a day   glucose blood test strip 9/17/2022 at Unknown time  No Yes   Sig: Use 1 each daily as needed (check sugars) Use as instructed   Uses One Touch Ultra test strip   insulin lispro protamine-insulin lispro (HumaLOG Mix 75/25) 100 units/mL 9/17/2022 at Unknown time  No Yes   Sig: Inject 45 Units under the skin 3 (three) times a day   losartan (COZAAR) 50 mg tablet 9/17/2022 at Unknown time  No Yes   Sig: Take 1 tablet (50 mg total) by mouth daily   potassium chloride (K-DUR,KLOR-CON) 20 mEq tablet 9/17/2022 at Unknown time  No Yes   Sig: Take 1 tablet (20 mEq total) by mouth daily   simvastatin (ZOCOR) 40 mg tablet 9/17/2022 at Unknown time  No Yes   Sig: Take 1 tablet (40 mg total) by mouth daily   tamsulosin (FLOMAX) 0 4 mg 9/17/2022 at Unknown time  No Yes   Sig: Take 1 capsule (0 4 mg total) by mouth daily with dinner      Facility-Administered Medications: None       Past Medical History:   Diagnosis Date    Abdominal pain     [facility]2018    Cardiac disease     [facility]     [facility], [facility]     Coronary artery disease     DDD (degenerative disc disease), lumbar 9/1/2020    Diabetes mellitus ([facility]     Dyspnea     GERD (gastroesophageal reflux disease)     Hyperlipidemia     Hypertension     MI [facility]myocardial infarction[facility] )     with 3 stents    Nodule of apex of right lung     JACQUELINE (obstructive sleep apnea)     Prostate cancer [facility])        Past Surgical History:   Procedure Laterality Date    ABDOMINAL SURGERY      exploratory    ANGIOPLASTY      3 stents    APPENDECTOMY      COLONOSCOPY      CT NEEDLE BIOPSY LUNG  11/3/2020    ESOPHAGOGASTRODUODENOSCOPY N/A 10/2/2017    Procedure: ESOPHAGOGASTRODUODENOSCOPY (EGD); Surgeon: Steve Gordon MD;  Location: [facility]; Service: Gastroenterology    IR THORACENTESIS  11/3/2020    KNEE CARTILAGE SURGERY      OTHER SURGICAL HISTORY      stent placement    PROSTATE SURGERY      SKIN GRAFT      Basal cell CA back       Family History   Problem Relation Age of Onset    Heart disease Father     Other Father         Mesothelioma      I have reviewed and agree with the history as documented  E-Cigarette/Vaping    E-Cigarette Use Never User      E-Cigarette/Vaping Substances    Nicotine No     THC No     CBD No     Flavoring No     Other No     Unknown No      Social History     Tobacco Use    Smoking status: Former Smoker     Packs/day: 1 50     Years: 50 00     Pack years: 75 00     Start date: 38     Quit date: 2020     Years since quittin 1    Smokeless tobacco: Never Used   Vaping Use    Vaping Use: Never used   Substance Use Topics    Alcohol use: Never    Drug use: No        Review of Systems   Constitutional: Negative for chills and fever  HENT: Negative for ear pain and sore throat  Eyes: Negative for pain and visual disturbance  Respiratory: Negative for cough and shortness of breath  Cardiovascular: Positive for chest pain  Negative for palpitations and leg swelling  Gastrointestinal: Positive for nausea  Negative for abdominal pain and vomiting  Genitourinary: Negative for dysuria and hematuria  Musculoskeletal: Negative for arthralgias and back pain  Skin: Negative for color change and rash  Neurological: Negative for seizures and syncope  Psychiatric/Behavioral: Negative for agitation and behavioral problems  All other systems reviewed and are negative        Physical Exam  ED Triage Vitals [09/17/22 1933]   Temperature Pulse Respirations Blood Pressure SpO2   97 6 °F (36 4 °C) 82 (!) 24 90/56 95 %      Temp Source Heart Rate Source Patient Position - Orthostatic VS BP Location FiO2 (%)   Oral Monitor Sitting Left arm --      Pain Score       8             Orthostatic Vital Signs  Vitals:    09/17/22 1933 09/17/22 2000 09/17/22 2207 09/17/22 2339   BP: 90/56 97/50 108/52 110/54   Pulse: 82 80 78 82   Patient Position - Orthostatic VS: Sitting          Physical Exam  Vitals and nursing note reviewed  Constitutional:       General: He is not in acute distress  Appearance: He is well-developed  He is obese  He is not toxic-appearing  Comments: Pale-appearing   HENT:      Head: Normocephalic and atraumatic  Eyes:      Conjunctiva/sclera: Conjunctivae normal    Cardiovascular:      Rate and Rhythm: Normal rate and regular rhythm  Heart sounds: Heart sounds are distant  No murmur heard  Pulmonary:      Effort: Accessory muscle usage present  No respiratory distress  Breath sounds: Examination of the right-upper field reveals wheezing  Examination of the left-upper field reveals wheezing  Examination of the right-middle field reveals wheezing  Examination of the left-middle field reveals wheezing  Examination of the right-lower field reveals decreased breath sounds  Examination of the left-lower field reveals decreased breath sounds  Decreased breath sounds and wheezing present  Chest:      Chest wall: No mass or tenderness  Comments: Chest pain not reproducible to palpation  Abdominal:      General: Abdomen is protuberant  Palpations: Abdomen is soft  Tenderness: There is no abdominal tenderness  Hernia: A hernia is present  Hernia is present in the ventral area  Musculoskeletal:      Cervical back: Neck supple  Skin:     General: Skin is warm and dry  Neurological:      Mental Status: He is alert           ED Medications  Medications   lidocaine (LIDODERM) 5 % patch 1 patch (1 patch Topical Medication Applied 9/17/22 2339)   aspirin chewable tablet 162 mg (162 mg Oral Given 9/17/22 2022)   ondansetron (ZOFRAN) injection 4 mg (4 mg Intravenous Given 9/17/22 2204)   HYDROmorphone (DILAUDID) injection 0 5 mg (0 5 mg Intravenous Given 9/17/22 2310)       Diagnostic Studies  Results Reviewed     Procedure Component Value Units Date/Time    HS Troponin I 2hr [[id number]]  (Normal) Collected: 09/17/22 2204    Lab Status: Final result Specimen: Blood from Arm, Right Updated: 09/17/22 2258     hs TnI 2hr 12 ng/L      Delta 2hr hsTnI 0 ng/L     FLU/RSV/COVID - if FLU/RSV clinically relevant [[id number]]  (Normal) Collected: 09/17/22 2204    Lab Status: Final result Specimen: Nares from Nasopharyngeal Swab Updated: 09/17/22 2252     SARS-CoV-2 Negative     INFLUENZA A PCR Negative     INFLUENZA B PCR Negative     RSV PCR Negative    Narrative:      FOR PEDIATRIC PATIENTS - copy/paste COVID Guidelines URL to browser: [email or web address]    SARS-CoV-2 assay is a Nucleic Acid Amplification assay intended for the  qualitative detection of nucleic acid from SARS-CoV-2 in nasopharyngeal  swabs  Results are for the presumptive identification of SARS-CoV-2 RNA  Positive results are indicative of infection with SARS-CoV-2, the virus  causing COVID-19, but do not rule out bacterial infection or co-infection  with other viruses  Laboratories within the [place] and its  territories are required to report all positive results to the appropriate  public health authorities  Negative results do not preclude SARS-CoV-2  infection and should not be used as the sole basis for treatment or other  patient management decisions  Negative results must be combined with  clinical observations, patient history, and epidemiological information  This test has not been FDA cleared or approved      This test has been authorized by FDA under an Emergency Use Authorization  (EUA)  This test is only authorized for the duration of time the  declaration that circumstances exist justifying the authorization of the  emergency use of an in vitro diagnostic tests for detection of SARS-CoV-2  virus and/or diagnosis of COVID-19 infection under section 564(b)(1) of  the Act, 21 U  S C  903VJM-5(Z)(1), unless the authorization is terminated  or revoked sooner  The test has been validated but independent review by FDA  and CLIA is pending  Test performed using [employer] Gene[employer]pert: This RT-PCR assay targets N2,  a region unique to SARS-CoV-2  A conserved region in the E-gene was chosen  for pan-Sarbecovirus detection which includes SARS-CoV-2      HS Troponin I 4hr [[id number]]     Lab Status: No result Specimen: Blood     HS Troponin 0hr (reflex protocol) [[id number]]  (Normal) Collected: 09/17/22 1943    Lab Status: Final result Specimen: Blood Updated: 09/17/22 2049     hs TnI 0hr 12 ng/L     NT-BNP PRO [[id number]]  (Abnormal) Collected: 09/17/22 1938    Lab Status: Final result Specimen: Blood from Arm, Right Updated: 09/17/22 2011     NT-proBNP 1,045 pg/mL     Comprehensive metabolic panel [[id number]]  (Abnormal) Collected: 09/17/22 1938    Lab Status: Final result Specimen: Blood from Arm, Right Updated: 09/17/22 2010     Sodium 136 mmol/L      Potassium 4 7 mmol/L      Chloride 107 mmol/L      CO2 25 mmol/L      ANION GAP 4 mmol/L      BUN 51 mg/dL      Creatinine 2 31 mg/dL      Glucose 107 mg/dL      Calcium 8 2 mg/dL      Corrected Calcium 9 2 mg/dL      AST 8 U/L      ALT 15 U/L      Alkaline Phosphatase 77 U/L      Total Protein 7 5 g/dL      Albumin 2 8 g/dL      Total Bilirubin 0 32 mg/dL      eGFR 26 ml/min/1 73sq m     Narrative:      Shahrzad guidelines for Chronic Kidney Disease (CKD):     Stage 1 with normal or high GFR (GFR > 90 mL/min/1 73 square meters)    Stage 2 Mild CKD (GFR = 60-89 mL/min/1 73 square meters)   Stage 3A Moderate CKD (GFR = 45-59 mL/min/1 73 square meters)    Stage 3B Moderate CKD (GFR = 30-44 mL/min/1 73 square meters)    Stage 4 Severe CKD (GFR = 15-29 mL/min/1 73 square meters)    Stage 5 End Stage CKD (GFR <15 mL/min/1 73 square meters)  Note: GFR calculation is accurate only with a steady state creatinine    Protime-INR [[id number]]  (Normal) Collected: 09/17/22 1938    Lab Status: Final result Specimen: Blood from Arm, Right Updated: 09/17/22 2008     Protime 13 3 seconds      INR 0 99    APTT [[id number]]  (Normal) Collected: 09/17/22 1938    Lab Status: Final result Specimen: Blood from Arm, Right Updated: 09/17/22 2008     PTT 25 seconds     Blood gas, venous [[id number]]  (Abnormal) Collected: 09/17/22 1943    Lab Status: Final result Specimen: Blood Updated: 09/17/22 1955     pH, Dwain 7 219     pCO2, Dwain 55 5 mm Hg      pO2, Dwain 33 1 mm Hg      HCO3, Dwain 22 2 mmol/L      Base Excess, Dwain -6 2 mmol/L      O2 Content, Dwain 11 2 ml/dL      O2 HGB, VENOUS 56 1 %     CBC and differential [[id number]]  (Abnormal) Collected: 09/17/22 1938    Lab Status: Final result Specimen: Blood from Arm, Right Updated: 09/17/22 1948     WBC 10 73 Thousand/uL      RBC 4 95 Million/uL      Hemoglobin 13 3 g/dL      Hematocrit 41 5 %      MCV 84 fL      MCH 26 9 pg      MCHC 32 0 g/dL      RDW 16 0 %      MPV 10 3 fL      Platelets 018 Thousands/uL      nRBC 0 /100 WBCs      Neutrophils Relative 78 %      Immat GRANS % 1 %      Lymphocytes Relative 12 %      Monocytes Relative 8 %      Eosinophils Relative 1 %      Basophils Relative 0 %      Neutrophils Absolute 8 29 Thousands/µL      Immature Grans Absolute 0 06 Thousand/uL      Lymphocytes Absolute 1 31 Thousands/µL      Monocytes Absolute 0 89 Thousand/µL      Eosinophils Absolute 0 15 Thousand/µL      Basophils Absolute 0 03 Thousands/µL                  CT chest wo contrast   Final Result by Rosamaria Perez MD (09/17 2220)      1    Right basal airway debris, mucous secretions versus aspiration  No new airspace infiltrate  2   Right lower lobe round atelectasis with stable pleural effusion and pleural thickening               Workstation performed: [id number]         XR chest 2 views    (Results Pending)         Procedures  ECG 12 Lead Documentation Only    Date/Time: 9/17/2022 10:05 PM  Performed by: Ivory Hearn MD  Authorized by: Ivory Hearn MD     ECG reviewed by me, the ED Provider: yes    Patient location:  ED  Previous ECG:     Previous ECG:  Compared to current    Similarity:  No change    Comparison to cardiac monitor: Yes    Interpretation:     Interpretation: abnormal    Rate:     ECG rate:  74    ECG rate assessment: normal    Rhythm:     Rhythm: atrial fibrillation    Ectopy:     Ectopy: none    QRS:     QRS intervals: Wide  Conduction:     Conduction: abnormal      Abnormal conduction: complete RBBB    ST segments:     ST segments:  Normal  T waves:     T waves: normal            ED Course             HEART Risk Score    Flowsheet Row Most Recent Value   Heart Score Risk Calculator    History 0 Filed at: 09/17/2022 2317   ECG 1 Filed at: 09/17/2022 2317   Age 2 Filed at: 09/17/2022 2317   Risk Factors 2 Filed at: 09/17/2022 2317   Troponin 0 Filed at: 09/17/2022 2317   HEART Score 5 Filed at: 09/17/2022 2317        Identification of Seniors at [address] [place] [address] Most Recent Value   (ISAR) Identification of Seniors at Risk    Before the illness or injury that brought you to the Emergency, did you need someone to help you on a regular basis? 0 Filed at: 09/17/2022 1935   In the last 24 hours, have you needed more help than usual? 0 Filed at: 09/17/2022 1935   Have you been hospitalized for one or more nights during the past 6 months? 1 Filed at: 09/17/2022 1935   In general, do you see well? 0 Filed at: 09/17/2022 1935   In general, do you have serious problems with your memory?  0 Filed at: 09/17/2022 1935   Do you take more than three different medications every day? 1 Filed at: 09/17/2022 1935   ISAR Score 2 Filed at: 09/17/2022 1935                    SBIRT 22yo+    Flowsheet Row Most Recent Value   SBIRT (23 yo +)    In order to provide better care to our patients, we are screening all of our patients for alcohol and drug use  Would it be okay to ask you these screening questions? No Filed at: 09/17/2022 2043                MDM  Number of Diagnoses or Management Options  Anxiety  Chest wall pain  Other chest pain  Diagnosis management comments: 68-year-old male past medical history of CHF, CKD, chronic respiratory failure (on 2 L O2 chronically at home), known right-sided pleural effusion, diabetes presents to ED complaining of central chest pain since early this evening  DDx: ACS, pneumonia, worsening pleural effusion, musculoskeletal chest pain  Will obtain chest pain workup including troponin, BMP and chest x-ray  Chest x-ray significant for right-sided pleural effusion and obscured cardiac border on the right  Will obtain CT chest to further evaluate  CT showing  Right basal airway debris, mucous secretions versus aspiration  No new airspace infiltrate  Right lower lobe round atelectasis with stable pleural effusion and pleural thickening  Delta trop obtain, 0  Patient's chest pain treated with 0 5 mg Dilaudid with relief of pain  Case discussed with inpatient medicine, was told that patient was extensively evaluated for chest pain which is unchanged from prior  This chest pain was deemed to be musculoskeletal in nature, no new quality no significant findings on workup here in the ED  Discussed risk benefits of going home with close PCP follow-up and patient agreeable to discharge with lidocaine patch for musculoskeletal chest pain and close PCP  Discussed strict return precautions for any new or worsening chest pain shortness of breath or any other concerning symptoms        Disposition  Final diagnoses:   Other chest pain   Chest wall pain   Anxiety     Time reflects when diagnosis was documented in both MDM as applicable and the Disposition within this note     Time User Action Codes Description Comment    9/17/2022 11:27 PM Judythe Sellers Add [R07 89] Other chest pain     9/17/2022 11:32 PM Judythe Sellers Add [R07 89] Chest wall pain     9/17/2022 11:32 PM Judythe Sellers Add [F41 9] Anxiety       ED Disposition     ED Disposition   Discharge    Condition   Stable    Date/Time   Sat Sep 17, 2022 11:32 PM    Comment   Ric Crawford Sr  discharge to home/self care  Follow-up Information     Follow up With Specialties Details Why Jaime Rodriguez MD Family Medicine Schedule an appointment as soon as possible for a visit   [address]  [phone] [address]  [phone]            Patient's Medications   Discharge Prescriptions    LIDOCAINE (LIDODERM) 5 %    Apply 1 patch topically daily for 6 days Remove & Discard patch within 12 hours or as directed by MD       Start Date: 9/17/2022 End Date: 9/23/2022       Order Dose: 1 patch       Quantity: 6 patch    Refills: 0     No discharge procedures on file  PDMP Review       Value Time User    PDMP Reviewed  Yes 8/29/2022 10:03 PM Darrell Rodriguez DO           ED Provider  Attending physically available and evaluated Ric Crawford Sr    I managed the patient along with the ED Attending      Electronically Signed by         Tony Monge MD  09/17/22 7123

## 2022-09-21 ENCOUNTER — PATIENT OUTREACH (OUTPATIENT)
Dept: FAMILY MEDICINE CLINIC | Facility: CLINIC | Age: 77
End: 2022-09-21

## 2022-09-21 NOTE — PROGRESS NOTES
This OPCM RN again attempted to call patient without success  There was no answer and a message was left with request for call back   No CCM episode was opened and and this OPCM RN removed self from care team

## 2022-10-10 ENCOUNTER — HOSPITAL ENCOUNTER (INPATIENT)
Facility: HOSPITAL | Age: 77
LOS: 5 days | Discharge: HOME HEALTH | DRG: 682 | End: 2022-10-15
Attending: EMERGENCY MEDICINE | Admitting: INTERNAL MEDICINE
Payer: COMMERCIAL

## 2022-10-10 ENCOUNTER — APPOINTMENT (OUTPATIENT)
Dept: RADIOLOGY | Facility: HOSPITAL | Age: 77
DRG: 682 | End: 2022-10-10
Payer: COMMERCIAL

## 2022-10-10 DIAGNOSIS — I48.91 A-FIB (HCC): ICD-10-CM

## 2022-10-10 DIAGNOSIS — I50.9 ACUTE EXACERBATION OF CHF (CONGESTIVE HEART FAILURE) (HCC): ICD-10-CM

## 2022-10-10 DIAGNOSIS — E11.649 TYPE 2 DIABETES MELLITUS WITH HYPOGLYCEMIA WITHOUT COMA, WITH LONG-TERM CURRENT USE OF INSULIN (HCC): ICD-10-CM

## 2022-10-10 DIAGNOSIS — Z79.4 TYPE 2 DIABETES MELLITUS WITH HYPERGLYCEMIA, WITH LONG-TERM CURRENT USE OF INSULIN (HCC): ICD-10-CM

## 2022-10-10 DIAGNOSIS — J44.1 COPD WITH ACUTE EXACERBATION (HCC): Primary | ICD-10-CM

## 2022-10-10 DIAGNOSIS — E11.65 TYPE 2 DIABETES MELLITUS WITH HYPERGLYCEMIA, WITH LONG-TERM CURRENT USE OF INSULIN (HCC): ICD-10-CM

## 2022-10-10 DIAGNOSIS — N17.9 AKI (ACUTE KIDNEY INJURY) (HCC): ICD-10-CM

## 2022-10-10 DIAGNOSIS — Z79.4 TYPE 2 DIABETES MELLITUS WITH HYPOGLYCEMIA WITHOUT COMA, WITH LONG-TERM CURRENT USE OF INSULIN (HCC): ICD-10-CM

## 2022-10-10 LAB
2HR DELTA HS TROPONIN: 1 NG/L
4HR DELTA HS TROPONIN: 1 NG/L
ALBUMIN SERPL BCP-MCNC: 2.8 G/DL (ref 3.5–5)
ALP SERPL-CCNC: 87 U/L (ref 46–116)
ALT SERPL W P-5'-P-CCNC: 15 U/L (ref 12–78)
ANION GAP SERPL CALCULATED.3IONS-SCNC: 8 MMOL/L (ref 4–13)
AST SERPL W P-5'-P-CCNC: 8 U/L (ref 5–45)
ATRIAL RATE: 108 BPM
BASOPHILS # BLD AUTO: 0.04 THOUSANDS/ÂΜL (ref 0–0.1)
BASOPHILS NFR BLD AUTO: 0 % (ref 0–1)
BILIRUB SERPL-MCNC: 0.29 MG/DL (ref 0.2–1)
BUN SERPL-MCNC: 41 MG/DL (ref 5–25)
CALCIUM ALBUM COR SERPL-MCNC: 9.5 MG/DL (ref 8.3–10.1)
CALCIUM SERPL-MCNC: 8.5 MG/DL (ref 8.3–10.1)
CARDIAC TROPONIN I PNL SERPL HS: 15 NG/L
CARDIAC TROPONIN I PNL SERPL HS: 16 NG/L
CARDIAC TROPONIN I PNL SERPL HS: 16 NG/L
CHLORIDE SERPL-SCNC: 105 MMOL/L (ref 96–108)
CO2 SERPL-SCNC: 22 MMOL/L (ref 21–32)
CREAT SERPL-MCNC: 2.27 MG/DL (ref 0.6–1.3)
EOSINOPHIL # BLD AUTO: 0.08 THOUSAND/ÂΜL (ref 0–0.61)
EOSINOPHIL NFR BLD AUTO: 1 % (ref 0–6)
ERYTHROCYTE [DISTWIDTH] IN BLOOD BY AUTOMATED COUNT: 15.3 % (ref 11.6–15.1)
EST. AVERAGE GLUCOSE BLD GHB EST-MCNC: 232 MG/DL
FLUAV RNA RESP QL NAA+PROBE: NEGATIVE
FLUBV RNA RESP QL NAA+PROBE: NEGATIVE
GFR SERPL CREATININE-BSD FRML MDRD: 27 ML/MIN/1.73SQ M
GLUCOSE SERPL-MCNC: 283 MG/DL (ref 65–140)
GLUCOSE SERPL-MCNC: 359 MG/DL (ref 65–140)
GLUCOSE SERPL-MCNC: 480 MG/DL (ref 65–140)
GLUCOSE SERPL-MCNC: 484 MG/DL (ref 65–140)
GLUCOSE SERPL-MCNC: >500 MG/DL (ref 65–140)
GLUCOSE SERPL-MCNC: >500 MG/DL (ref 65–140)
HBA1C MFR BLD: 9.7 %
HCT VFR BLD AUTO: 35.4 % (ref 36.5–49.3)
HGB BLD-MCNC: 12.2 G/DL (ref 12–17)
IMM GRANULOCYTES # BLD AUTO: 0.07 THOUSAND/UL (ref 0–0.2)
IMM GRANULOCYTES NFR BLD AUTO: 1 % (ref 0–2)
LIPASE SERPL-CCNC: 174 U/L (ref 73–393)
LYMPHOCYTES # BLD AUTO: 0.8 THOUSANDS/ÂΜL (ref 0.6–4.47)
LYMPHOCYTES NFR BLD AUTO: 6 % (ref 14–44)
MCH RBC QN AUTO: 28.6 PG (ref 26.8–34.3)
MCHC RBC AUTO-ENTMCNC: 34.5 G/DL (ref 31.4–37.4)
MCV RBC AUTO: 83 FL (ref 82–98)
MONOCYTES # BLD AUTO: 0.97 THOUSAND/ÂΜL (ref 0.17–1.22)
MONOCYTES NFR BLD AUTO: 7 % (ref 4–12)
NEUTROPHILS # BLD AUTO: 11.35 THOUSANDS/ÂΜL (ref 1.85–7.62)
NEUTS SEG NFR BLD AUTO: 85 % (ref 43–75)
NRBC BLD AUTO-RTO: 0 /100 WBCS
NT-PROBNP SERPL-MCNC: 3176 PG/ML
PLATELET # BLD AUTO: 112 THOUSANDS/UL (ref 149–390)
PMV BLD AUTO: 11.5 FL (ref 8.9–12.7)
POTASSIUM SERPL-SCNC: 4.4 MMOL/L (ref 3.5–5.3)
PROT SERPL-MCNC: 7.4 G/DL (ref 6.4–8.4)
QRS AXIS: 155 DEGREES
QRSD INTERVAL: 134 MS
QT INTERVAL: 400 MS
QTC INTERVAL: 518 MS
RBC # BLD AUTO: 4.27 MILLION/UL (ref 3.88–5.62)
RSV RNA RESP QL NAA+PROBE: NEGATIVE
SARS-COV-2 RNA RESP QL NAA+PROBE: NEGATIVE
SODIUM SERPL-SCNC: 135 MMOL/L (ref 135–147)
T WAVE AXIS: 46 DEGREES
TSH SERPL DL<=0.05 MIU/L-ACNC: 1.1 UIU/ML (ref 0.45–4.5)
VENTRICULAR RATE: 101 BPM
WBC # BLD AUTO: 13.31 THOUSAND/UL (ref 4.31–10.16)

## 2022-10-10 PROCEDURE — 85025 COMPLETE CBC W/AUTO DIFF WBC: CPT | Performed by: EMERGENCY MEDICINE

## 2022-10-10 PROCEDURE — 80053 COMPREHEN METABOLIC PANEL: CPT | Performed by: EMERGENCY MEDICINE

## 2022-10-10 PROCEDURE — 83036 HEMOGLOBIN GLYCOSYLATED A1C: CPT | Performed by: INTERNAL MEDICINE

## 2022-10-10 PROCEDURE — 93010 ELECTROCARDIOGRAM REPORT: CPT | Performed by: INTERNAL MEDICINE

## 2022-10-10 PROCEDURE — 96374 THER/PROPH/DIAG INJ IV PUSH: CPT

## 2022-10-10 PROCEDURE — 94640 AIRWAY INHALATION TREATMENT: CPT

## 2022-10-10 PROCEDURE — 84484 ASSAY OF TROPONIN QUANT: CPT | Performed by: EMERGENCY MEDICINE

## 2022-10-10 PROCEDURE — 84443 ASSAY THYROID STIM HORMONE: CPT | Performed by: INTERNAL MEDICINE

## 2022-10-10 PROCEDURE — 0241U HB NFCT DS VIR RESP RNA 4 TRGT: CPT | Performed by: INTERNAL MEDICINE

## 2022-10-10 PROCEDURE — 83880 ASSAY OF NATRIURETIC PEPTIDE: CPT | Performed by: EMERGENCY MEDICINE

## 2022-10-10 PROCEDURE — 99285 EMERGENCY DEPT VISIT HI MDM: CPT

## 2022-10-10 PROCEDURE — 71045 X-RAY EXAM CHEST 1 VIEW: CPT

## 2022-10-10 PROCEDURE — 96375 TX/PRO/DX INJ NEW DRUG ADDON: CPT

## 2022-10-10 PROCEDURE — 99285 EMERGENCY DEPT VISIT HI MDM: CPT | Performed by: EMERGENCY MEDICINE

## 2022-10-10 PROCEDURE — 93005 ELECTROCARDIOGRAM TRACING: CPT

## 2022-10-10 PROCEDURE — 99222 1ST HOSP IP/OBS MODERATE 55: CPT | Performed by: INTERNAL MEDICINE

## 2022-10-10 PROCEDURE — 36415 COLL VENOUS BLD VENIPUNCTURE: CPT | Performed by: EMERGENCY MEDICINE

## 2022-10-10 PROCEDURE — 82948 REAGENT STRIP/BLOOD GLUCOSE: CPT

## 2022-10-10 PROCEDURE — 83690 ASSAY OF LIPASE: CPT

## 2022-10-10 RX ORDER — INSULIN LISPRO 100 [IU]/ML
15 INJECTION, SOLUTION INTRAVENOUS; SUBCUTANEOUS ONCE
Status: DISCONTINUED | OUTPATIENT
Start: 2022-10-10 | End: 2022-10-10

## 2022-10-10 RX ORDER — METHYLPREDNISOLONE SODIUM SUCCINATE 40 MG/ML
40 INJECTION, POWDER, LYOPHILIZED, FOR SOLUTION INTRAMUSCULAR; INTRAVENOUS EVERY 12 HOURS SCHEDULED
Status: DISCONTINUED | OUTPATIENT
Start: 2022-10-10 | End: 2022-10-11

## 2022-10-10 RX ORDER — TAMSULOSIN HYDROCHLORIDE 0.4 MG/1
0.4 CAPSULE ORAL
Status: DISCONTINUED | OUTPATIENT
Start: 2022-10-10 | End: 2022-10-15 | Stop reason: HOSPADM

## 2022-10-10 RX ORDER — LEVALBUTEROL 1.25 MG/.5ML
1.25 SOLUTION, CONCENTRATE RESPIRATORY (INHALATION)
Status: DISCONTINUED | OUTPATIENT
Start: 2022-10-10 | End: 2022-10-15 | Stop reason: HOSPADM

## 2022-10-10 RX ORDER — INSULIN ASPART 100 [IU]/ML
45 INJECTION, SUSPENSION SUBCUTANEOUS
Status: DISCONTINUED | OUTPATIENT
Start: 2022-10-10 | End: 2022-10-10

## 2022-10-10 RX ORDER — INSULIN LISPRO 100 [IU]/ML
2-12 INJECTION, SOLUTION INTRAVENOUS; SUBCUTANEOUS
Status: DISCONTINUED | OUTPATIENT
Start: 2022-10-10 | End: 2022-10-10

## 2022-10-10 RX ORDER — ONDANSETRON 2 MG/ML
4 INJECTION INTRAMUSCULAR; INTRAVENOUS EVERY 6 HOURS PRN
Status: DISCONTINUED | OUTPATIENT
Start: 2022-10-10 | End: 2022-10-10

## 2022-10-10 RX ORDER — PRAVASTATIN SODIUM 40 MG
40 TABLET ORAL
Status: DISCONTINUED | OUTPATIENT
Start: 2022-10-10 | End: 2022-10-15 | Stop reason: HOSPADM

## 2022-10-10 RX ORDER — ACETAMINOPHEN 325 MG/1
975 TABLET ORAL ONCE
Status: COMPLETED | OUTPATIENT
Start: 2022-10-10 | End: 2022-10-10

## 2022-10-10 RX ORDER — FERROUS SULFATE 325(65) MG
325 TABLET ORAL
Status: DISCONTINUED | OUTPATIENT
Start: 2022-10-11 | End: 2022-10-15 | Stop reason: HOSPADM

## 2022-10-10 RX ORDER — CYCLOBENZAPRINE HCL 10 MG
10 TABLET ORAL 2 TIMES DAILY PRN
Status: DISCONTINUED | OUTPATIENT
Start: 2022-10-10 | End: 2022-10-15 | Stop reason: HOSPADM

## 2022-10-10 RX ORDER — GABAPENTIN 100 MG/1
100 CAPSULE ORAL 3 TIMES DAILY
Status: DISCONTINUED | OUTPATIENT
Start: 2022-10-10 | End: 2022-10-15 | Stop reason: HOSPADM

## 2022-10-10 RX ORDER — HEPARIN SODIUM 5000 [USP'U]/ML
5000 INJECTION, SOLUTION INTRAVENOUS; SUBCUTANEOUS EVERY 8 HOURS SCHEDULED
Status: DISCONTINUED | OUTPATIENT
Start: 2022-10-10 | End: 2022-10-10

## 2022-10-10 RX ORDER — ALBUTEROL SULFATE 2.5 MG/3ML
5 SOLUTION RESPIRATORY (INHALATION) ONCE
Status: COMPLETED | OUTPATIENT
Start: 2022-10-10 | End: 2022-10-10

## 2022-10-10 RX ORDER — INSULIN ASPART 100 [IU]/ML
45 INJECTION, SUSPENSION SUBCUTANEOUS
Status: DISCONTINUED | OUTPATIENT
Start: 2022-10-11 | End: 2022-10-10

## 2022-10-10 RX ORDER — FUROSEMIDE 40 MG/1
40 TABLET ORAL DAILY
Status: DISCONTINUED | OUTPATIENT
Start: 2022-10-11 | End: 2022-10-11

## 2022-10-10 RX ORDER — INSULIN LISPRO 100 [IU]/ML
1-6 INJECTION, SOLUTION INTRAVENOUS; SUBCUTANEOUS
Status: DISCONTINUED | OUTPATIENT
Start: 2022-10-10 | End: 2022-10-10

## 2022-10-10 RX ORDER — AZITHROMYCIN 500 MG/1
500 TABLET, FILM COATED ORAL EVERY 24 HOURS
Status: CANCELLED | OUTPATIENT
Start: 2022-10-10 | End: 2022-10-13

## 2022-10-10 RX ORDER — CARVEDILOL 6.25 MG/1
6.25 TABLET ORAL 2 TIMES DAILY WITH MEALS
Status: DISCONTINUED | OUTPATIENT
Start: 2022-10-10 | End: 2022-10-15 | Stop reason: HOSPADM

## 2022-10-10 RX ORDER — FUROSEMIDE 10 MG/ML
40 INJECTION INTRAMUSCULAR; INTRAVENOUS ONCE
Status: COMPLETED | OUTPATIENT
Start: 2022-10-10 | End: 2022-10-10

## 2022-10-10 RX ORDER — ACETAMINOPHEN 325 MG/1
650 TABLET ORAL EVERY 6 HOURS PRN
Status: DISCONTINUED | OUTPATIENT
Start: 2022-10-10 | End: 2022-10-15 | Stop reason: HOSPADM

## 2022-10-10 RX ORDER — ASPIRIN 81 MG/1
81 TABLET ORAL DAILY
Status: DISCONTINUED | OUTPATIENT
Start: 2022-10-10 | End: 2022-10-15 | Stop reason: HOSPADM

## 2022-10-10 RX ORDER — METHYLPREDNISOLONE SODIUM SUCCINATE 125 MG/2ML
125 INJECTION, POWDER, LYOPHILIZED, FOR SOLUTION INTRAMUSCULAR; INTRAVENOUS ONCE
Status: COMPLETED | OUTPATIENT
Start: 2022-10-10 | End: 2022-10-10

## 2022-10-10 RX ADMIN — ACETAMINOPHEN 975 MG: 325 TABLET, FILM COATED ORAL at 09:44

## 2022-10-10 RX ADMIN — PRAVASTATIN SODIUM 40 MG: 40 TABLET ORAL at 17:02

## 2022-10-10 RX ADMIN — APIXABAN 5 MG: 5 TABLET, FILM COATED ORAL at 17:02

## 2022-10-10 RX ADMIN — FUROSEMIDE 40 MG: 10 INJECTION, SOLUTION INTRAMUSCULAR; INTRAVENOUS at 10:42

## 2022-10-10 RX ADMIN — GABAPENTIN 100 MG: 100 CAPSULE ORAL at 22:56

## 2022-10-10 RX ADMIN — GABAPENTIN 100 MG: 100 CAPSULE ORAL at 17:02

## 2022-10-10 RX ADMIN — ALBUTEROL SULFATE 5 MG: 2.5 SOLUTION RESPIRATORY (INHALATION) at 09:45

## 2022-10-10 RX ADMIN — METHYLPREDNISOLONE SODIUM SUCCINATE 125 MG: 125 INJECTION, POWDER, FOR SOLUTION INTRAMUSCULAR; INTRAVENOUS at 10:16

## 2022-10-10 RX ADMIN — TAMSULOSIN HYDROCHLORIDE 0.4 MG: 0.4 CAPSULE ORAL at 17:02

## 2022-10-10 RX ADMIN — IPRATROPIUM BROMIDE 0.5 MG: 0.5 SOLUTION RESPIRATORY (INHALATION) at 19:14

## 2022-10-10 RX ADMIN — LEVALBUTEROL HYDROCHLORIDE 1.25 MG: 1.25 SOLUTION, CONCENTRATE RESPIRATORY (INHALATION) at 19:14

## 2022-10-10 RX ADMIN — INSULIN ASPART 45 UNITS: 100 INJECTION, SUSPENSION SUBCUTANEOUS at 17:02

## 2022-10-10 RX ADMIN — CARVEDILOL 6.25 MG: 6.25 TABLET, FILM COATED ORAL at 17:02

## 2022-10-10 RX ADMIN — METHYLPREDNISOLONE SODIUM SUCCINATE 40 MG: 40 INJECTION, POWDER, FOR SOLUTION INTRAMUSCULAR; INTRAVENOUS at 22:56

## 2022-10-10 RX ADMIN — SODIUM CHLORIDE 7 UNITS/HR: 9 INJECTION, SOLUTION INTRAVENOUS at 22:53

## 2022-10-10 RX ADMIN — ASPIRIN 81 MG: 81 TABLET, COATED ORAL at 12:51

## 2022-10-10 RX ADMIN — IPRATROPIUM BROMIDE 1 MG: 0.5 SOLUTION RESPIRATORY (INHALATION) at 09:45

## 2022-10-10 RX ADMIN — INSULIN LISPRO 12 UNITS: 100 INJECTION, SOLUTION INTRAVENOUS; SUBCUTANEOUS at 12:15

## 2022-10-10 RX ADMIN — INSULIN LISPRO 12 UNITS: 100 INJECTION, SOLUTION INTRAVENOUS; SUBCUTANEOUS at 17:03

## 2022-10-10 NOTE — ASSESSMENT & PLAN NOTE
Noted in prior EKG's as well but w/o formal evaluation per cardiology, Presenting EKG reading confirmed per cardiology  · Continue Coreg  · [id number] of 6, will require anticoagulation  Started on Eliquis, case management to price check    · Outpatient follow-up with Cardiology

## 2022-10-10 NOTE — ASSESSMENT & PLAN NOTE
Admitted with shortness of breath and wheezing as well as increasing oxygen requirements  · COVID/influenza/RSV negative  · Chest x-ray without any acute findings  · Improved with intravenous steroids, treated as COPD exacerbation, switch to oral prednisone for quick taper    · Rest of plan as below

## 2022-10-10 NOTE — ASSESSMENT & PLAN NOTE
Likely secondary to COPD exacerbation  Though elevated probnp, clinically appears to be compensated   No cxr findings of pulm vas congestion, and minimal LE edema  Rule out covid  Baseline chronic resp failure, on o2 2L prn  Cont supportive care

## 2022-10-10 NOTE — ED PROVIDER NOTES
History  Chief Complaint   Patient presents with   • Shortness of Breath     Pt has been feeling SOB for about a week, chest pain that feels like pressure w/ upset stomach feeling  80M PMH MI, CHF, COPD presented to the emergency department shortness of breath  Patient reports over the past week he has had progressive shortness of breath, he normally wears 2L O2 NC at home as needed but has required it continuously throughout the week  He denies fevers and has a mild dry cough but denies congestion or sore throat  He reports having a constant pressure across the lower chest that has been 10/10 and is nonradiating and nonexertional   He has chronic nausea but denies vomiting  He states he has been taking his medications including his diuretic, he denies worsening leg swelling  Prior to Admission Medications   Prescriptions Last Dose Informant Patient Reported? Taking?   aspirin (ECOTRIN LOW STRENGTH) 81 mg EC tablet   No No   Sig: Take 1 tablet (81 mg total) by mouth daily   carvedilol (COREG) 6 25 mg tablet   No No   Sig: Take 1 tablet (6 25 mg total) by mouth 2 (two) times a day with meals   cyclobenzaprine (FLEXERIL) 10 mg tablet   No No   Sig: Take 1 tablet (10 mg total) by mouth 2 (two) times a day as needed for muscle spasms   ferrous sulfate 325 (65 Fe) mg tablet   No No   Sig: Take 1 tablet (325 mg total) by mouth daily with breakfast   fluticasone-umeclidinium-vilanterol (Trelegy Ellipta) 100-62 5-25 MCG/INH inhaler   No No   Sig: Inhale 1 puff daily Rinse mouth after use  furosemide (LASIX) 40 mg tablet   No No   Sig: Take 1 tablet (40 mg total) by mouth daily   gabapentin (NEURONTIN) 100 mg capsule   No No   Sig: Take 1 capsule (100 mg total) by mouth 3 (three) times a day   glucose blood test strip   No No   Sig: Use 1 each daily as needed (check sugars) Use as instructed   Uses One Touch Ultra test strip   insulin lispro protamine-insulin lispro (HumaLOG Mix 75/25) 100 units/mL   No No Sig: Inject 45 Units under the skin 3 (three) times a day   lidocaine (Lidoderm) 5 %   No No   Sig: Apply 1 patch topically daily for 6 days Remove & Discard patch within 12 hours or as directed by MD   losartan (COZAAR) 50 mg tablet   No No   Sig: Take 1 tablet (50 mg total) by mouth daily   potassium chloride (K-DUR,KLOR-CON) 20 mEq tablet   No No   Sig: Take 1 tablet (20 mEq total) by mouth daily   simvastatin (ZOCOR) 40 mg tablet   No No   Sig: Take 1 tablet (40 mg total) by mouth daily   tamsulosin (FLOMAX) 0 4 mg   No No   Sig: Take 1 capsule (0 4 mg total) by mouth daily with dinner      Facility-Administered Medications: None       Past Medical History:   Diagnosis Date   • Abdominal pain    • MARANDA (acute kidney injury) ([facility] ) 6/19/2018   • Cardiac disease    • CHF (congestive heart failure) ([facility])    • COPD, severe ([facility])    • Coronary artery disease    • DDD (degenerative disc disease), lumbar 9/1/2020   • Diabetes mellitus (HCC)    • Dyspnea    • GERD (gastroesophageal reflux disease)    • Hyperlipidemia    • Hypertension    • MI (myocardial infarction) ([facility] )     with 3 stents   • Nodule of apex of right lung    • JACQUELINE (obstructive sleep apnea)    • Prostate cancer [facility])        Past Surgical History:   Procedure Laterality Date   • ABDOMINAL SURGERY      exploratory   • ANGIOPLASTY      3 stents   • APPENDECTOMY     • COLONOSCOPY  2015   • CT NEEDLE BIOPSY LUNG  11/3/2020   • ESOPHAGOGASTRODUODENOSCOPY N/A 10/2/2017    Procedure: ESOPHAGOGASTRODUODENOSCOPY (EGD); Surgeon: Jacinda Meyers MD;  Location: [facility]; Service: Gastroenterology   • IR THORACENTESIS  11/3/2020   • KNEE CARTILAGE SURGERY     • OTHER SURGICAL HISTORY      stent placement   • PROSTATE SURGERY     • SKIN GRAFT      Basal cell CA back       Family History   Problem Relation Age of Onset   • Heart disease Father    • Other Father         Mesothelioma      I have reviewed and agree with the history as documented      E-Cigarette/Vaping   • E-Cigarette Use Never User      E-Cigarette/Vaping Substances   • Nicotine No    • THC No    • CBD No    • Flavoring No    • Other No    • Unknown No      Social History     Tobacco Use   • Smoking status: Former Smoker     Packs/day: 1 50     Years: 50 00     Pack years: 75 00     Start date: 38     Quit date: 2020     Years since quittin 2   • Smokeless tobacco: Never Used   Vaping Use   • Vaping Use: Never used   Substance Use Topics   • Alcohol use: Never   • Drug use: No        Review of Systems   Constitutional: Negative for fever  HENT: Negative for congestion and sore throat  Respiratory: Positive for cough, shortness of breath and wheezing  Cardiovascular: Positive for chest pain  Gastrointestinal: Positive for nausea  Negative for abdominal pain, diarrhea and vomiting  Genitourinary: Negative for dysuria  All other systems reviewed and are negative  Physical Exam  ED Triage Vitals   Temperature Pulse Respirations Blood Pressure SpO2   10/10/22 1015 10/10/22 0910 10/10/22 0910 10/10/22 0910 10/10/22 0910   98 5 °F (36 9 °C) 101 15 137/62 98 %      Temp Source Heart Rate Source Patient Position - Orthostatic VS BP Location FiO2 (%)   10/10/22 1015 10/10/22 0910 10/10/22 0910 10/10/22 0910 --   Oral Monitor Lying Left arm       Pain Score       10/10/22 0944       8             Orthostatic Vital Signs  Vitals:    10/10/22 0910 10/10/22 1308   BP: 137/62 142/63   Pulse: 101 90   Patient Position - Orthostatic VS: Lying        Physical Exam  Vitals and nursing note reviewed  Constitutional:       General: He is not in acute distress  HENT:      Head: Normocephalic  Mouth/Throat:      Mouth: Mucous membranes are moist       Pharynx: Oropharynx is clear  Eyes:      Pupils: Pupils are equal, round, and reactive to light  Cardiovascular:      Rate and Rhythm: Normal rate and regular rhythm  Heart sounds: No murmur heard    Pulmonary:      Effort: No respiratory distress  Breath sounds: No wheezing or rhonchi  Comments: Mild tachypnea  No wheezing but decreased air movement bilaterally  Abdominal:      General: Abdomen is flat  There is no distension  Palpations: Abdomen is soft  Tenderness: There is no abdominal tenderness  There is no guarding or rebound  Musculoskeletal:      Right lower leg: Edema (Trace) present  Left lower leg: Edema (Trace) present  Skin:     General: Skin is warm and dry  Neurological:      Mental Status: He is alert           ED Medications  Medications   acetaminophen (TYLENOL) tablet 650 mg (has no administration in time range)   insulin lispro (HumaLOG) 100 units/mL subcutaneous injection 2-12 Units (12 Units Subcutaneous Given 10/10/22 1703)   insulin lispro (HumaLOG) 100 units/mL subcutaneous injection 1-6 Units (has no administration in time range)   aspirin (ECOTRIN LOW STRENGTH) EC tablet 81 mg (81 mg Oral Given 10/10/22 1251)   carvedilol (COREG) tablet 6 25 mg (6 25 mg Oral Given 10/10/22 1702)   cyclobenzaprine (FLEXERIL) tablet 10 mg (has no administration in time range)   ferrous sulfate tablet 325 mg (has no administration in time range)   fluticasone-vilanterol (BREO ELLIPTA) 100-25 mcg/inh inhaler 1 puff (has no administration in time range)   furosemide (LASIX) tablet 40 mg (has no administration in time range)   gabapentin (NEURONTIN) capsule 100 mg (100 mg Oral Given 10/10/22 1702)   insulin aspart protamine-insulin aspart (NovoLOG 70/30) 100 units/mL subcutaneous injection 45 Units (45 Units Subcutaneous Given 10/10/22 1702)   pravastatin (PRAVACHOL) tablet 40 mg (40 mg Oral Given 10/10/22 1702)   tamsulosin (FLOMAX) capsule 0 4 mg (0 4 mg Oral Given 10/10/22 1702)   methylPREDNISolone sodium succinate (Solu-MEDROL) injection 40 mg (has no administration in time range)   levalbuterol (XOPENEX) inhalation solution 1 25 mg (1 25 mg Nebulization Not Given 10/10/22 1336)   apixaban (ELIQUIS) tablet 5 mg (5 mg Oral Given 10/10/22 1702)   umeclidinium bromide (INCRUSE ELLIPTA) 62 5 mcg/inh inhaler AEPB 1 puff (has no administration in time range)   ipratropium (ATROVENT) 0 02 % inhalation solution 0 5 mg (0 5 mg Nebulization Not Given 10/10/22 1336)   acetaminophen (TYLENOL) tablet 975 mg (975 mg Oral Given 10/10/22 0944)   albuterol inhalation solution 5 mg (5 mg Nebulization Given 10/10/22 0945)   ipratropium (ATROVENT) 0 02 % inhalation solution 1 mg (1 mg Nebulization Given 10/10/22 0945)   methylPREDNISolone sodium succinate (Solu-MEDROL) injection 125 mg (125 mg Intravenous Given 10/10/22 1016)   furosemide (LASIX) injection 40 mg (40 mg Intravenous Given 10/10/22 1042)       Diagnostic Studies  Results Reviewed     Procedure Component Value Units Date/Time    HS Troponin I 4hr [[id number]]  (Normal) Collected: 10/10/22 1318    Lab Status: Final result Specimen: Blood from Arm, Right Updated: 10/10/22 1401     hs TnI 4hr 16 ng/L      Delta 4hr hsTnI 1 ng/L     Hemoglobin A1C [[id number]]  (Abnormal) Collected: 10/10/22 0929    Lab Status: Final result Specimen: Blood from Arm, Right Updated: 10/10/22 1316     Hemoglobin A1C 9 7 %       mg/dl     TSH, 3rd generation with Free T4 reflex [[id number]]  (Normal) Collected: 10/10/22 0929    Lab Status: Final result Specimen: Blood from Arm, Right Updated: 10/10/22 1308     TSH 3RD GENERATON 1 100 uIU/mL     Narrative:      Patients undergoing fluorescein dye angiography may retain small amounts of fluorescein in the body for 48-72 hours post procedure  Samples containing fluorescein can produce falsely depressed TSH values  If the patient had this procedure,a specimen should be resubmitted post fluorescein clearance        HS Troponin I 2hr [[id number]]  (Normal) Collected: 10/10/22 1134    Lab Status: Final result Specimen: Blood from Arm, Right Updated: 10/10/22 1209     hs TnI 2hr 16 ng/L      Delta 2hr hsTnI 1 ng/L     Fingerstick Glucose (POCT) [[id number]] (Abnormal) Collected: 10/10/22 1153    Lab Status: Final result Updated: 10/10/22 1154     POC Glucose 484 mg/dl     FLU/RSV/COVID - if FLU/RSV clinically relevant [[id number]]  (Normal) Collected: 10/10/22 1101    Lab Status: Final result Specimen: Nares from Nose Updated: 10/10/22 1147     SARS-CoV-2 Negative     INFLUENZA A PCR Negative     INFLUENZA B PCR Negative     RSV PCR Negative    Narrative:      FOR PEDIATRIC PATIENTS - copy/paste COVID Guidelines URL to browser: [email or web address]  [employer]    SARS-CoV-2 assay is a Nucleic Acid Amplification assay intended for the  qualitative detection of nucleic acid from SARS-CoV-2 in nasopharyngeal  swabs  Results are for the presumptive identification of SARS-CoV-2 RNA  Positive results are indicative of infection with SARS-CoV-2, the virus  causing COVID-19, but do not rule out bacterial infection or co-infection  with other viruses  Laboratories within the [place] and its  territories are required to report all positive results to the appropriate  public health authorities  Negative results do not preclude SARS-CoV-2  infection and should not be used as the sole basis for treatment or other  patient management decisions  Negative results must be combined with  clinical observations, patient history, and epidemiological information  This test has not been FDA cleared or approved  This test has been authorized by FDA under an Emergency Use Authorization  (EUA)  This test is only authorized for the duration of time the  declaration that circumstances exist justifying the authorization of the  emergency use of an in vitro diagnostic tests for detection of SARS-CoV-2  virus and/or diagnosis of COVID-19 infection under section 564(b)(1) of  the Act, 21 U  S C  598LAQ-0(B)(7), unless the authorization is terminated  or revoked sooner   The test has been validated but independent review by FDA  and CLIA is pending  Test performed using [employer] GeneXpert: This RT-PCR assay targets N2,  a region unique to SARS-CoV-2  A conserved region in the E-gene was chosen  for pan-Sarbecovirus detection which includes SARS-CoV-2  According to CMS-2020-01-R, this platform meets the definition of high-throughput technology      HS Troponin 0hr (reflex protocol) [[id number]]  (Normal) Collected: 10/10/22 0929    Lab Status: Final result Specimen: Blood from Arm, Right Updated: 10/10/22 1017     hs TnI 0hr 15 ng/L     Comprehensive metabolic panel [[id number]]  (Abnormal) Collected: 10/10/22 0929    Lab Status: Final result Specimen: Blood from Arm, Right Updated: 10/10/22 1006     Sodium 135 mmol/L      Potassium 4 4 mmol/L      Chloride 105 mmol/L      CO2 22 mmol/L      ANION GAP 8 mmol/L      BUN 41 mg/dL      Creatinine 2 27 mg/dL      Glucose 359 mg/dL      Calcium 8 5 mg/dL      Corrected Calcium 9 5 mg/dL      AST 8 U/L      ALT 15 U/L      Alkaline Phosphatase 87 U/L      Total Protein 7 4 g/dL      Albumin 2 8 g/dL      Total Bilirubin 0 29 mg/dL      eGFR 27 ml/min/1 73sq m     Narrative:      [place] guidelines for Chronic Kidney Disease (CKD):   •  Stage 1 with normal or high GFR (GFR > 90 mL/min/1 73 square meters)  •  Stage 2 Mild CKD (GFR = 60-89 mL/min/1 73 square meters)  •  Stage 3A Moderate CKD (GFR = 45-59 mL/min/1 73 square meters)  •  Stage 3B Moderate CKD (GFR = 30-44 mL/min/1 73 square meters)  •  Stage 4 Severe CKD (GFR = 15-29 mL/min/1 73 square meters)  •  Stage 5 End Stage CKD (GFR <15 mL/min/1 73 square meters)  Note: GFR calculation is accurate only with a steady state creatinine    NT-BNP PRO [[id number]]  (Abnormal) Collected: 10/10/22 0929    Lab Status: Final result Specimen: Blood from Arm, Right Updated: 10/10/22 1006     NT-proBNP 3,176 pg/mL     Lipase [[id number]]  (Normal) Collected: 10/10/22 0929    Lab Status: Final result Specimen: Blood from Arm, Right Updated: 10/10/22 1002     Lipase 174 u/L     CBC and differential [[id number]]  (Abnormal) Collected: 10/10/22 0929    Lab Status: Final result Specimen: Blood from Arm, Right Updated: 10/10/22 0938     WBC 13 31 Thousand/uL      RBC 4 27 Million/uL      Hemoglobin 12 2 g/dL      Hematocrit 35 4 %      MCV 83 fL      MCH 28 6 pg      MCHC 34 5 g/dL      RDW 15 3 %      MPV 11 5 fL      Platelets 275 Thousands/uL      nRBC 0 /100 WBCs      Neutrophils Relative 85 %      Immat GRANS % 1 %      Lymphocytes Relative 6 %      Monocytes Relative 7 %      Eosinophils Relative 1 %      Basophils Relative 0 %      Neutrophils Absolute 11 35 Thousands/µL      Immature Grans Absolute 0 07 Thousand/uL      Lymphocytes Absolute 0 80 Thousands/µL      Monocytes Absolute 0 97 Thousand/µL      Eosinophils Absolute 0 08 Thousand/µL      Basophils Absolute 0 04 Thousands/µL                  XR chest 1 view portable   Final Result by Arcadio Boast, MD (10/10 2262)      No acute cardiopulmonary disease  Redemonstration of scar in the right upper lung, right base rounded atelectasis, and small loculated right effusion  Workstation performed: [id number]               Procedures  ECG 12 Lead Documentation Only    Date/Time: 10/10/2022 9:28 AM  Performed by: Lizandro Tapia MD  Authorized by: Lizandro Tapia MD     ECG reviewed by me, the ED Provider: yes    Patient location:  ED  Previous ECG:     Previous ECG:  Compared to current    Similarity:  No change  Interpretation:     Interpretation: abnormal    Rate:     ECG rate:  101    ECG rate assessment: tachycardic    Rhythm:     Rhythm: sinus tachycardia    Ectopy:     Ectopy: none    QRS:     QRS axis:  Right    QRS intervals:   Wide  Conduction:     Conduction: abnormal      Abnormal conduction: complete RBBB    ST segments:     ST segments:  Normal  T waves:     T waves: normal            ED Course MDM  Number of Diagnoses or Management Options  Acute exacerbation of CHF (congestive heart failure) (HCC)  COPD with acute exacerbation (HCC)  Diagnosis management comments: 77M PMH MI, CHF, COPD presented to the emergency department shortness of breath  Workup including vital signs, physical exam, EKG, chest x-ray, labs  Exam with decreased air movement bilaterally  X-ray with chronic right atelectasis and small effusion, no new infiltrate  Elevated BNP  Likely mixed component of CHF and COPD  Treatment including nebulizers, steroids, and diuresis  Plan for admission to medicine  Disposition  Final diagnoses:   COPD with acute exacerbation ([facility] )   Acute exacerbation of CHF (congestive heart failure) ([place] [address] )     Time reflects when diagnosis was documented in both MDM as applicable and the Disposition within this note     Time User Action Codes Description Comment    10/10/2022 10:46 AM Kev López Add [J44 1] COPD with acute exacerbation ([place] [address] )     10/10/2022 10:47 AM Kev López Add [I50 9] Acute exacerbation of CHF (congestive heart failure) [facility])       ED Disposition     ED Disposition   Admit    Condition   Stable    Date/Time   Mon Oct 10, 2022 10:47 AM    Comment   Case was discussed with Dr Renea Guillen and the patient's admission status was agreed to be Admission Status: inpatient status to the service of Dr Renea Guillen              Follow-up Information    None         Current Discharge Medication List      CONTINUE these medications which have NOT CHANGED    Details   aspirin (ECOTRIN LOW STRENGTH) 81 mg EC tablet Take 1 tablet (81 mg total) by mouth daily  Qty: 30 tablet, Refills: 0    Associated Diagnoses: Essential hypertension; Coronary artery disease involving native coronary artery of native heart without angina pectoris      carvedilol (COREG) 6 25 mg tablet Take 1 tablet (6 25 mg total) by mouth 2 (two) times a day with meals  Qty: 60 tablet, Refills: 0    Associated Diagnoses: Essential hypertension; Coronary artery disease involving native coronary artery of native heart without angina pectoris      cyclobenzaprine (FLEXERIL) 10 mg tablet Take 1 tablet (10 mg total) by mouth 2 (two) times a day as needed for muscle spasms  Qty: 21 tablet, Refills: 0    Associated Diagnoses: Muscle spasm      ferrous sulfate 325 (65 Fe) mg tablet Take 1 tablet (325 mg total) by mouth daily with breakfast  Qty: 30 tablet, Refills: 0    Associated Diagnoses: Stage 3a chronic kidney disease ([facility])      fluticasone-umeclidinium-vilanterol (Trelegy Ellipta) 100-62 5-25 MCG/INH inhaler Inhale 1 puff daily Rinse mouth after use  Qty: 1 each, Refills: 0    Associated Diagnoses: COPD exacerbation ([address] )      furosemide (LASIX) 40 mg tablet Take 1 tablet (40 mg total) by mouth daily  Qty: 30 tablet, Refills: 0    Associated Diagnoses: Acute on chronic diastolic (congestive) heart failure ([facility])      gabapentin (NEURONTIN) 100 mg capsule Take 1 capsule (100 mg total) by mouth 3 (three) times a day  Qty: 90 capsule, Refills: 0    Associated Diagnoses: Chronic bilateral low back pain without sciatica      glucose blood test strip Use 1 each daily as needed (check sugars) Use as instructed  Uses One Touch Ultra test strip  Qty: 50 strip, Refills: 2    Comments: Needs to be delivered   Has no transportation  Associated Diagnoses: Type 2 diabetes mellitus with hypoglycemia without coma, with long-term current use of insulin ([facility])      insulin lispro protamine-insulin lispro (HumaLOG Mix 75/25) 100 units/mL Inject 45 Units under the skin 3 (three) times a day  Qty: 40 5 mL, Refills: 0    Associated Diagnoses: Type 2 diabetes mellitus with hypoglycemia without coma, with long-term current use of insulin ([facility])      lidocaine (Lidoderm) 5 % Apply 1 patch topically daily for 6 days Remove & Discard patch within 12 hours or as directed by MD  Qty: 6 patch, Refills: 0    Associated Diagnoses: Chest wall pain      losartan (COZAAR) 50 mg tablet Take 1 tablet (50 mg total) by mouth daily  Qty: 30 tablet, Refills: 0    Associated Diagnoses: Essential hypertension      potassium chloride (K-DUR,KLOR-CON) 20 mEq tablet Take 1 tablet (20 mEq total) by mouth daily  Qty: 30 tablet, Refills: 0    Comments: Take this pill when you take your lasix  Associated Diagnoses: Chronic respiratory failure with hypoxia and hypercapnia (HCC)      simvastatin (ZOCOR) 40 mg tablet Take 1 tablet (40 mg total) by mouth daily  Qty: 30 tablet, Refills: 0    Associated Diagnoses: Hypercholesterolemia      tamsulosin (FLOMAX) 0 4 mg Take 1 capsule (0 4 mg total) by mouth daily with dinner  Qty: 30 capsule, Refills: 0    Associated Diagnoses: Acute cystitis without hematuria           No discharge procedures on file  PDMP Review       Value Time User    PDMP Reviewed  Yes 8/29/2022 10:03 PM Marycruz Davis DO           ED Provider  Attending physically available and evaluated Sally Marshall Raymundo    I managed the patient along with the ED Attending      Electronically Signed by         Supa Agustin MD  10/10/22 2938

## 2022-10-10 NOTE — ASSESSMENT & PLAN NOTE
Lab Results   Component Value Date    EGFR 24 10/11/2022    EGFR 27 10/10/2022    EGFR 26 09/17/2022    CREATININE 2 51 (H) 10/11/2022    CREATININE 2 27 (H) 10/10/2022    CREATININE 2 31 (H) 09/17/2022   · MARANDA on CKD, prior baseline appears to be 1 5-1 7, 2 27 on admission, worse today at 2 51   · Holding Lasix and losartan  · Gentle intravenous hydration  · Check PVR, do no issues with urinary retention in the past   · Avoid nephrotoxins and hypotension  · BMP in a m

## 2022-10-10 NOTE — ASSESSMENT & PLAN NOTE
Wt Readings from Last 3 Encounters:   09/17/22 101 kg (222 lb)   09/02/22 107 kg (235 lb 8 3 oz)   08/25/22 103 kg (228 lb)   Cont outpt diuretic dosing  Cont to monitor fluid status

## 2022-10-10 NOTE — ASSESSMENT & PLAN NOTE
Lab Results   Component Value Date    HGBA1C 10 3 (H) 07/06/2022       No results for input(s): POCGLU in the last 72 hours      Blood Sugar Average: Last 72 hrs:  Hx of poor control  Resume on insulin regimen, ISS  Repeat hgA1c as last one > 3 months ago

## 2022-10-10 NOTE — H&P
[address] [facility]  H&P- Rich Janellek Sr  1945, 78 y o  male MRN: [id number]  Unit/Bed#: CW2 218-01 Encounter: [id number]  Primary Care Provider: Manish Inman MD   Date and time admitted to hospital: 10/10/2022  9:01 AM    * SOB (shortness of breath)  Assessment & Plan  Likely secondary to COPD exacerbation  Though elevated probnp, clinically appears to be compensated  No cxr findings of pulm vas congestion, wt lower  than most recent's admission, and minimal LE edema  Rule out covid  Baseline chronic resp failure, on o2 2L prn  Cont supportive care    COPD exacerbation ([address] )  Assessment & Plan  Place on IV steriods 40mg bid  Pulm toilet  R/o covid  No consolidation on cxr      Chronic diastolic heart failure (HCC)  Assessment & Plan  Wt Readings from Last 3 Encounters:   10/10/22 94 8 kg (209 lb)   09/17/22 101 kg (222 lb)   09/02/22 107 kg (235 lb 8 3 oz)   Cont outpt diuretic dosing  Cont to monitor fluid status          Chronic respiratory failure with hypoxia (HCC)  Assessment & Plan  On home o2 2l prn  Wean to baseline requirement    CKD (chronic kidney disease) stage 3, GFR 30-59 ml/min [facility])  Assessment & Plan  Lab Results   Component Value Date    EGFR 27 10/10/2022    EGFR 26 09/17/2022    EGFR 37 09/02/2022    CREATININE 2 27 (H) 10/10/2022    CREATININE 2 31 (H) 09/17/2022    CREATININE 1 74 (H) 09/02/2022   Cr w/in baseline range  Cont to monitor    A-fib [facility])  Assessment & Plan  Noted in prior EKG's as well but w/o formal evaluation per cardiology  Presenting EKG reading confirmed per cardiology  Cont on coreg  Most recent Echo 5/2022 w/o mitral stenosis  [id number] of 6, thus will require a/c  Baseline CKD but does not meet age or wt cutoff  Start on eliquis 5mg bid   Will need confirmation of insurance coverage per CM  Outpt f/u with cardiology      Essential hypertension  Assessment & Plan  Stable    Type 2 diabetes mellitus with hyperglycemia, with long-term current use of insulin ([facility] )  Assessment & Plan  Lab Results   Component Value Date    HGBA1C 9 7 (H) 10/10/2022       Recent Labs     10/10/22  1153   POCGLU 484*       Blood Sugar Average: Last 72 hrs:  (P) 484Hx of poor control  Resume on insulin regimen, ISS  Repeat hgA1c as last one > 3 months ago    VTE Prophylaxis: Enoxaparin (Lovenox)  / sequential compression device   Code Status: Full code  POLST: There is no POLST form on file for this patient (pre-hospital)  Discussion with family: Discussed with patient    Anticipated Length of Stay:  Patient will be admitted on an Inpatient basis with an anticipated length of stay of  > 2 midnights  Justification for Hospital Stay: SOB, COPD exacerbation    Total Time for Visit, including Counseling / Coordination of Care: 70 minutes  Greater than 50% of this total time spent on direct patient counseling and coordination of care  Chief Complaint:   SOB x 1 week    History of Present Illness:    Timmy Scherer Sr  is a 78 y o  male medical hx significant for CAD, CKD stg III, COPD, chronic resp failure, CHF, hx of non compliance presents to the ED with c/o SOB x 1 week  He reports of non productive cough  He reports as well of chest tightness across his lower sternum, no radiation  Chronically he is on 2L NC as needed but has noticed that he has had to use it more frequently  He denies any worsening LE, states his LE looks the best it has been  He admits that he does not weigh himself  He denies palpitation, fever, chills  He is adamant that he has been compliant with his medications  Upon presentation to the ed, was given IV lasix, IV steroids, nebs with some improvement  Review of Systems:    Review of Systems   Respiratory: Positive for cough and shortness of breath  Cardiovascular: Positive for chest pain  All other systems reviewed and are negative        Past Medical and Surgical History:     Past Medical History:   Diagnosis Date   • Abdominal pain    • MARANDA (acute kidney injury) ([facility] [address] ) 6/19/2018   • Cardiac disease    • CHF (congestive heart failure) (HCC)    • COPD, severe (HCC)    • Coronary artery disease    • DDD (degenerative disc disease), lumbar 9/1/2020   • Diabetes mellitus (HCC)    • Dyspnea    • GERD (gastroesophageal reflux disease)    • Hyperlipidemia    • Hypertension    • MI (myocardial infarction) ([facility] )     with 3 stents   • Nodule of apex of right lung    • JACQUELINE (obstructive sleep apnea)    • Prostate cancer [facility])        Past Surgical History:   Procedure Laterality Date   • ABDOMINAL SURGERY      exploratory   • ANGIOPLASTY      3 stents   • APPENDECTOMY     • COLONOSCOPY  2015   • CT NEEDLE BIOPSY LUNG  11/3/2020   • ESOPHAGOGASTRODUODENOSCOPY N/A 10/2/2017    Procedure: ESOPHAGOGASTRODUODENOSCOPY (EGD); Surgeon: Kimberly Fair MD;  Location: [facility]; Service: Gastroenterology   • IR THORACENTESIS  11/3/2020   • KNEE CARTILAGE SURGERY     • OTHER SURGICAL HISTORY      stent placement   • PROSTATE SURGERY     • SKIN GRAFT      Basal cell CA back       Meds/Allergies:    Prior to Admission medications    Medication Sig Start Date End Date Taking? Authorizing Provider   aspirin (ECOTRIN LOW STRENGTH) 81 mg EC tablet Take 1 tablet (81 mg total) by mouth daily 9/1/22 10/1/22  Gilmer Hanson, DO   carvedilol (COREG) 6 25 mg tablet Take 1 tablet (6 25 mg total) by mouth 2 (two) times a day with meals 9/1/22 10/1/22  Gilmer Hanson, DO   cyclobenzaprine (FLEXERIL) 10 mg tablet Take 1 tablet (10 mg total) by mouth 2 (two) times a day as needed for muscle spasms 9/1/22   Gilmer Hanson, DO   ferrous sulfate 325 (65 Fe) mg tablet Take 1 tablet (325 mg total) by mouth daily with breakfast 9/1/22 10/1/22  Gilmer Hanson, DO   fluticasone-umeclidinium-vilanterol (Trelegy Ellipta) 378-47 5-79 MCG/INH inhaler Inhale 1 puff daily Rinse mouth after use   9/1/22 10/1/22  Oliva Hubbard, DO   furosemide (LASIX) 40 mg tablet Take 1 tablet (40 mg total) by mouth daily 9/2/22 10/2/22  Ladona Fitting, DO   gabapentin (NEURONTIN) 100 mg capsule Take 1 capsule (100 mg total) by mouth 3 (three) times a day 9/1/22   Ladona Fitting, DO   glucose blood test strip Use 1 each daily as needed (check sugars) Use as instructed  Uses One Touch Ultra test strip 8/23/22   Rao Harrington MD   insulin lispro protamine-insulin lispro (HumaLOG Mix 75/25) 100 units/mL Inject 45 Units under the skin 3 (three) times a day 9/1/22 10/1/22  Ladona Fitting, DO   lidocaine (Lidoderm) 5 % Apply 1 patch topically daily for 6 days Remove & Discard patch within 12 hours or as directed by MD 9/17/22 9/23/22  Kassi Alonzo MD   losartan (COZAAR) 50 mg tablet Take 1 tablet (50 mg total) by mouth daily 9/2/22 10/2/22  Ladona Fitting, DO   potassium chloride (K-DUR,KLOR-CON) 20 mEq tablet Take 1 tablet (20 mEq total) by mouth daily 9/2/22 10/2/22  Ladona Fitting, DO   simvastatin (ZOCOR) 40 mg tablet Take 1 tablet (40 mg total) by mouth daily 9/1/22 10/1/22  Ladona Fitting, DO   tamsulosin [facility]) 0 4 mg Take 1 capsule (0 4 mg total) by mouth daily with dinner 9/1/22 10/1/22  Ladona Fitting, DO   cyanocobalamin 1,000 mcg/mL Inject 1 mL (1,000 mcg total) into a muscle every 30 (thirty) days 8/16/22 8/22/22  Milad James MD   insulin aspart protamine-insulin aspart (NovoLOG 70/30) 100 units/mL injection Inject 45 Units under the skin every 8 (eight) hours 8/16/22 8/16/22  Milad James MD   lisinopril (ZESTRIL) 10 mg tablet TAKE 1 TABLET BY MOUTH DAILY 8/17/22 8/22/22  Danyell Arcos MD     I have reviewed home medications using allscripts  Allergies: Allergies   Allergen Reactions   • Crestor [Rosuvastatin] Other (See Comments)     Unknown     • Lisinopril GI Intolerance, Dizziness and Abdominal Pain   • Metformin GI Intolerance       Social History:     Marital Status:     Occupation: Patient Pre-hospital Living Situation: Resides with son  Patient Pre-hospital Level of Mobility: Independent  Patient Pre-hospital Diet Restrictions: None  Substance Use History:   Social History     Substance and Sexual Activity   Alcohol Use Never     Social History     Tobacco Use   Smoking Status Former Smoker   • Packs/day: 1 50   • Years: 50 00   • Pack years: 75 00   • Start date: 38   • Quit date: 2020   • Years since quittin 2   Smokeless Tobacco Never Used     Social History     Substance and Sexual Activity   Drug Use No       Family History:    Family History   Problem Relation Age of Onset   • Heart disease Father    • Other Father         Mesothelioma        Physical Exam:     Vitals:   Blood Pressure: 142/63 (10/10/22 1308)  Pulse: 90 (10/10/22 1308)  Temperature: 97 8 °F (36 6 °C) (10/10/22 1308)  Temp Source: Oral (10/10/22 1015)  Respirations: 15 (10/10/22 0910)  Weight - Scale: 94 8 kg (209 lb) (10/10/22 1308)  SpO2: 100 % (10/10/22 1308)    Physical Exam  Cardiovascular:      Rate and Rhythm: Normal rate and regular rhythm  Pulses: Normal pulses  Heart sounds: Normal heart sounds  No murmur heard  Pulmonary:      Effort: Pulmonary effort is normal  No respiratory distress  Breath sounds: Wheezing present  No rales  Comments: Moderate air entry  Abdominal:      General: Bowel sounds are normal  There is no distension  Palpations: Abdomen is soft  Tenderness: There is no abdominal tenderness  There is no guarding  Musculoskeletal:         General: Normal range of motion  Cervical back: Normal range of motion and neck supple  Comments: Trace edema   Skin:     General: Skin is warm and dry  Comments: Chronic skin changes consistent with chronic venous stasis   Neurological:      General: No focal deficit present  Mental Status: He is alert and oriented to person, place, and time  Mental status is at baseline  Cranial Nerves:  No cranial nerve deficit  Motor: No weakness  Additional Data:     Lab Results: I have personally reviewed pertinent reports  Results from last 7 days   Lab Units 10/10/22  0929   WBC Thousand/uL 13 31*   HEMOGLOBIN g/dL 12 2   HEMATOCRIT % 35 4*   PLATELETS Thousands/uL 112*   NEUTROS PCT % 85*   LYMPHS PCT % 6*   MONOS PCT % 7   EOS PCT % 1     Results from last 7 days   Lab Units 10/10/22  0929   SODIUM mmol/L 135   POTASSIUM mmol/L 4 4   CHLORIDE mmol/L 105   CO2 mmol/L 22   BUN mg/dL 41*   CREATININE mg/dL 2 27*   ANION GAP mmol/L 8   CALCIUM mg/dL 8 5   ALBUMIN g/dL 2 8*   TOTAL BILIRUBIN mg/dL 0 29   ALK PHOS U/L 87   ALT U/L 15   AST U/L 8   GLUCOSE RANDOM mg/dL 359*         Results from last 7 days   Lab Units 10/10/22  1153   POC GLUCOSE mg/dl 484*     Results from last 7 days   Lab Units 10/10/22  0929   HEMOGLOBIN A1C % 9 7*           Imaging: I have personally reviewed pertinent reports  XR chest 1 view portable   Final Result by Nicolás Mak MD (10/10 9748)      No acute cardiopulmonary disease  Redemonstration of scar in the right upper lung, right base rounded atelectasis, and small loculated right effusion  Workstation performed: [id number]             EKG, Pathology, and Other Studies Reviewed on Admission:   · EKG: afib at 101 bpm, RBBB (as confirmed per cardiology)    Allscripts / Epic Records Reviewed: Yes     ** Please Note: This note has been constructed using a voice recognition system   **

## 2022-10-10 NOTE — ED ATTENDING ATTESTATION
10/10/2022  IKeaton MD, saw and evaluated the patient  I have discussed the patient with the resident/non-physician practitioner and agree with the resident's/non-physician practitioner's findings, Plan of Care, and MDM as documented in the resident's/non-physician practitioner's note, except where noted  All available labs and Radiology studies were reviewed  I was present for key portions of any procedure(s) performed by the resident/non-physician practitioner and I was immediately available to provide assistance  At this point I agree with the current assessment done in the Emergency Department  I have conducted an independent evaluation of this patient a history and physical is as follows:    ED Course     72-year-old male presenting to the emergency department for evaluation of chest discomfort and shortness of breath  Patient reports bilateral lower sternal chest discomfort  Nonradiating  No known alleviating or exacerbating factors  Patient also endorses shortness of breath  No cough  No fever  Positive nausea without vomiting  Ten systems reviewed and negative except as noted in the history of present illness  On examination the patient appears chronically sick  Head is normocephalic atraumatic  Eyelids lashes normal   Lung sounds are diminished bilaterally  No wheezing rales or rhonchi  Heart is tachycardic and regular  Abdomen is nondistended, soft and nontender  Lower extremities with bilateral lower extremity venous stasis changes      Labs Reviewed   CBC AND DIFFERENTIAL - Abnormal       Result Value Ref Range Status    WBC 13 31 (*) 4 31 - 10 16 Thousand/uL Final    RBC 4 27  3 88 - 5 62 Million/uL Final    Hemoglobin 12 2  12 0 - 17 0 g/dL Final    Hematocrit 35 4 (*) 36 5 - 49 3 % Final    MCV 83  82 - 98 fL Final    MCH 28 6  26 8 - 34 3 pg Final    MCHC 34 5  31 4 - 37 4 g/dL Final    RDW 15 3 (*) 11 6 - 15 1 % Final    MPV 11 5  8 9 - 12 7 fL Final Platelets 658 (*) 785 - 390 Thousands/uL Final    nRBC 0  /100 WBCs Final    Neutrophils Relative 85 (*) 43 - 75 % Final    Immat GRANS % 1  0 - 2 % Final    Lymphocytes Relative 6 (*) 14 - 44 % Final    Monocytes Relative 7  4 - 12 % Final    Eosinophils Relative 1  0 - 6 % Final    Basophils Relative 0  0 - 1 % Final    Neutrophils Absolute 11 35 (*) 1 85 - 7 62 Thousands/µL Final    Immature Grans Absolute 0 07  0 00 - 0 20 Thousand/uL Final    Lymphocytes Absolute 0 80  0 60 - 4 47 Thousands/µL Final    Monocytes Absolute 0 97  0 17 - 1 22 Thousand/µL Final    Eosinophils Absolute 0 08  0 00 - 0 61 Thousand/µL Final    Basophils Absolute 0 04  0 00 - 0 10 Thousands/µL Final   COMPREHENSIVE METABOLIC PANEL - Abnormal    Sodium 135  135 - 147 mmol/L Final    Potassium 4 4  3 5 - 5 3 mmol/L Final    Chloride 105  96 - 108 mmol/L Final    CO2 22  21 - 32 mmol/L Final    ANION GAP 8  4 - 13 mmol/L Final    BUN 41 (*) 5 - 25 mg/dL Final    Creatinine 2 27 (*) 0 60 - 1 30 mg/dL Final    Comment: Standardized to IDMS reference method    Glucose 359 (*) 65 - 140 mg/dL Final    Comment: If the patient is fasting, the ADA then defines impaired fasting glucose as > 100 mg/dL and diabetes as > or equal to 123 mg/dL  Specimen collection should occur prior to Sulfasalazine administration due to the potential for falsely depressed results  Specimen collection should occur prior to Sulfapyridine administration due to the potential for falsely elevated results  Calcium 8 5  8 3 - 10 1 mg/dL Final    Corrected Calcium 9 5  8 3 - 10 1 mg/dL Final    AST 8  5 - 45 U/L Final    Comment: Specimen collection should occur prior to Sulfasalazine administration due to the potential for falsely depressed results  ALT 15  12 - 78 U/L Final    Comment: Specimen collection should occur prior to Sulfasalazine and/or Sulfapyridine administration due to the potential for falsely depressed results       Alkaline Phosphatase 87  46 - 116 U/L Final    Total Protein 7 4  6 4 - 8 4 g/dL Final    Albumin 2 8 (*) 3 5 - 5 0 g/dL Final    Total Bilirubin 0 29  0 20 - 1 00 mg/dL Final    Comment: Use of this assay is not recommended for patients undergoing treatment with eltrombopag due to the potential for falsely elevated results  eGFR 27  ml/min/1 73sq m Final    Narrative:     [place] guidelines for Chronic Kidney Disease (CKD):   •  Stage 1 with normal or high GFR (GFR > 90 mL/min/1 73 square meters)  •  Stage 2 Mild CKD (GFR = 60-89 mL/min/1 73 square meters)  •  Stage 3A Moderate CKD (GFR = 45-59 mL/min/1 73 square meters)  •  Stage 3B Moderate CKD (GFR = 30-44 mL/min/1 73 square meters)  •  Stage 4 Severe CKD (GFR = 15-29 mL/min/1 73 square meters)  •  Stage 5 End Stage CKD (GFR <15 mL/min/1 73 square meters)  Note: GFR calculation is accurate only with a steady state creatinine   NT-BNP PRO (BRAIN NATRIURETIC PEPTIDE) - Abnormal    NT-proBNP 3,176 (*) <450 pg/mL Final   HS TROPONIN I 0HR - Normal    hs TnI 0hr 15  "Refer to ACS Flowchart"- see link ng/L Final    Comment:                                              Initial (time 0) result  If >=50 ng/L, Myocardial injury suggested ;  Type of myocardial injury and treatment strategy  to be determined  If 5-49 ng/L, a delta result at 2 hours and or 4 hours will be needed to further evaluate  If <4 ng/L, and chest pain has been >3 hours since onset, patient may qualify for discharge based on the HEART score in the ED  If <5 ng/L and <3hours since onset of chest pain, a delta result at 2 hours will be needed to further evaluate  HS Troponin 99th Percentile URL of a Health Population=12 ng/L with a 95% Confidence Interval of 8-18 ng/L  Second Troponin (time 2 hours)  If calculated delta >= 20 ng/L,  Myocardial injury suggested ; Type of myocardial injury and treatment strategy to be determined    If 5-49 ng/L and the calculated delta is 5-19 ng/L, consult medical service for evaluation  Continue evaluation for ischemia on ecg and other possible etiology and repeat hs troponin at 4 hours  If delta is <5 ng/L at 2 hours, consider discharge based on risk stratification via the HEART score (if in ED), or MIKKI risk score in IP/Observation  HS Troponin 99th Percentile URL of a Health Population=12 ng/L with a 95% Confidence Interval of 8-18 ng/L  LIPASE - Normal    Lipase 174  73 - 393 u/L Final   COVID19, INFLUENZA A/B, RSV PCR, [facility]   HS TROPONIN I 2HR   HS TROPONIN I 4HR   LIGHT BLUE TOP       XR chest 1 view portable   Final Result      No acute cardiopulmonary disease  Redemonstration of scar in the right upper lung, right base rounded atelectasis, and small loculated right effusion                    Workstation performed: [id number]                 Critical Care Time  Procedures

## 2022-10-10 NOTE — ASSESSMENT & PLAN NOTE
Lab Results   Component Value Date    EGFR 27 10/10/2022    EGFR 26 09/17/2022    EGFR 37 09/02/2022    CREATININE 2 27 (H) 10/10/2022    CREATININE 2 31 (H) 09/17/2022    CREATININE 1 74 (H) 09/02/2022   Cr w/in baseline range  Cont to monitor

## 2022-10-10 NOTE — ASSESSMENT & PLAN NOTE
Wt Readings from Last 3 Encounters:   10/11/22 94 6 kg (208 lb 8 9 oz)   09/17/22 101 kg (222 lb)   09/02/22 107 kg (235 lb 8 3 oz)   · Appears euvolemic to dry on exam   · Continue beta-blocker, holding diuretic secondary to above    · I&Os, daily weights, low-sodium diet  · Monitor volume status

## 2022-10-10 NOTE — ASSESSMENT & PLAN NOTE
Noted in prior EKG's as well but w/o formal evaluation per cardiology  Presenting EKG reading confirmed per cardiology  Cont on coreg  Most recent Echo 5/2022 w/o mitral stenosis  [id number] of 6, thus will require a/c  Baseline CKD but does not meet age or wt cutoff  Start on eliquis 5mg bid   Will need confirmation of insurance coverage per CM  Outpt f/u with cardiology

## 2022-10-10 NOTE — ASSESSMENT & PLAN NOTE
Lab Results   Component Value Date    HGBA1C 9 7 (H) 10/10/2022       Recent Labs     10/10/22  2252 10/11/22  0105 10/11/22  0323 10/11/22  0628   POCGLU 283* 100 126 193*     Hx of poor control  · Hyperglycemic on admission requiring insulin gtt overnight, improved  · Home regimen 75/25 tid with meals continued, continue sliding scale    · Continue carb control diet  · PCP manages insulin, Recommend patient to follow up with endocrinology outpatient

## 2022-10-10 NOTE — ED NOTES
covid swab requested by [facility] for bed placement on [address], RN  10/10/22 0801

## 2022-10-11 PROBLEM — N30.00 ACUTE CYSTITIS WITHOUT HEMATURIA: Status: RESOLVED | Noted: 2022-06-15 | Resolved: 2022-10-11

## 2022-10-11 LAB
ANION GAP SERPL CALCULATED.3IONS-SCNC: 7 MMOL/L (ref 4–13)
BUN SERPL-MCNC: 50 MG/DL (ref 5–25)
CALCIUM SERPL-MCNC: 8.3 MG/DL (ref 8.3–10.1)
CHLORIDE SERPL-SCNC: 104 MMOL/L (ref 96–108)
CO2 SERPL-SCNC: 24 MMOL/L (ref 21–32)
CREAT SERPL-MCNC: 2.51 MG/DL (ref 0.6–1.3)
ERYTHROCYTE [DISTWIDTH] IN BLOOD BY AUTOMATED COUNT: 14.9 % (ref 11.6–15.1)
GFR SERPL CREATININE-BSD FRML MDRD: 24 ML/MIN/1.73SQ M
GLUCOSE SERPL-MCNC: 100 MG/DL (ref 65–140)
GLUCOSE SERPL-MCNC: 126 MG/DL (ref 65–140)
GLUCOSE SERPL-MCNC: 160 MG/DL (ref 65–140)
GLUCOSE SERPL-MCNC: 165 MG/DL (ref 65–140)
GLUCOSE SERPL-MCNC: 193 MG/DL (ref 65–140)
GLUCOSE SERPL-MCNC: 209 MG/DL (ref 65–140)
GLUCOSE SERPL-MCNC: 262 MG/DL (ref 65–140)
HCT VFR BLD AUTO: 30.8 % (ref 36.5–49.3)
HGB BLD-MCNC: 10.2 G/DL (ref 12–17)
MCH RBC QN AUTO: 27.3 PG (ref 26.8–34.3)
MCHC RBC AUTO-ENTMCNC: 33.1 G/DL (ref 31.4–37.4)
MCV RBC AUTO: 82 FL (ref 82–98)
PLATELET # BLD AUTO: 103 THOUSANDS/UL (ref 149–390)
PMV BLD AUTO: 11.4 FL (ref 8.9–12.7)
POTASSIUM SERPL-SCNC: 4.6 MMOL/L (ref 3.5–5.3)
RBC # BLD AUTO: 3.74 MILLION/UL (ref 3.88–5.62)
SODIUM SERPL-SCNC: 135 MMOL/L (ref 135–147)
WBC # BLD AUTO: 6.92 THOUSAND/UL (ref 4.31–10.16)

## 2022-10-11 PROCEDURE — 82948 REAGENT STRIP/BLOOD GLUCOSE: CPT

## 2022-10-11 PROCEDURE — 85027 COMPLETE CBC AUTOMATED: CPT | Performed by: INTERNAL MEDICINE

## 2022-10-11 PROCEDURE — 94640 AIRWAY INHALATION TREATMENT: CPT

## 2022-10-11 PROCEDURE — 99232 SBSQ HOSP IP/OBS MODERATE 35: CPT | Performed by: NURSE PRACTITIONER

## 2022-10-11 PROCEDURE — 80048 BASIC METABOLIC PNL TOTAL CA: CPT | Performed by: INTERNAL MEDICINE

## 2022-10-11 PROCEDURE — 94760 N-INVAS EAR/PLS OXIMETRY 1: CPT

## 2022-10-11 RX ORDER — INSULIN ASPART 100 [IU]/ML
45 INJECTION, SUSPENSION SUBCUTANEOUS
Status: DISCONTINUED | OUTPATIENT
Start: 2022-10-11 | End: 2022-10-14

## 2022-10-11 RX ORDER — ALPRAZOLAM 0.25 MG/1
0.25 TABLET ORAL
Status: COMPLETED | OUTPATIENT
Start: 2022-10-11 | End: 2022-10-11

## 2022-10-11 RX ORDER — PREDNISONE 20 MG/1
40 TABLET ORAL DAILY
Status: DISCONTINUED | OUTPATIENT
Start: 2022-10-12 | End: 2022-10-15 | Stop reason: HOSPADM

## 2022-10-11 RX ORDER — LANOLIN ALCOHOL/MO/W.PET/CERES
3 CREAM (GRAM) TOPICAL
Status: DISCONTINUED | OUTPATIENT
Start: 2022-10-11 | End: 2022-10-15 | Stop reason: HOSPADM

## 2022-10-11 RX ORDER — INSULIN LISPRO 100 [IU]/ML
2-12 INJECTION, SOLUTION INTRAVENOUS; SUBCUTANEOUS
Status: DISCONTINUED | OUTPATIENT
Start: 2022-10-11 | End: 2022-10-15 | Stop reason: HOSPADM

## 2022-10-11 RX ORDER — PANTOPRAZOLE SODIUM 40 MG/1
40 TABLET, DELAYED RELEASE ORAL
Status: DISCONTINUED | OUTPATIENT
Start: 2022-10-11 | End: 2022-10-15 | Stop reason: HOSPADM

## 2022-10-11 RX ORDER — SODIUM CHLORIDE 9 MG/ML
50 INJECTION, SOLUTION INTRAVENOUS ONCE
Status: COMPLETED | OUTPATIENT
Start: 2022-10-11 | End: 2022-10-11

## 2022-10-11 RX ADMIN — INSULIN LISPRO 2 UNITS: 100 INJECTION, SOLUTION INTRAVENOUS; SUBCUTANEOUS at 06:38

## 2022-10-11 RX ADMIN — IPRATROPIUM BROMIDE 0.5 MG: 0.5 SOLUTION RESPIRATORY (INHALATION) at 19:20

## 2022-10-11 RX ADMIN — TAMSULOSIN HYDROCHLORIDE 0.4 MG: 0.4 CAPSULE ORAL at 17:17

## 2022-10-11 RX ADMIN — METHYLPREDNISOLONE SODIUM SUCCINATE 40 MG: 40 INJECTION, POWDER, FOR SOLUTION INTRAMUSCULAR; INTRAVENOUS at 08:03

## 2022-10-11 RX ADMIN — UMECLIDINIUM 1 PUFF: 62.5 AEROSOL, POWDER ORAL at 08:05

## 2022-10-11 RX ADMIN — IPRATROPIUM BROMIDE 0.5 MG: 0.5 SOLUTION RESPIRATORY (INHALATION) at 09:30

## 2022-10-11 RX ADMIN — CARVEDILOL 6.25 MG: 6.25 TABLET, FILM COATED ORAL at 08:03

## 2022-10-11 RX ADMIN — INSULIN ASPART 45 UNITS: 100 INJECTION, SUSPENSION SUBCUTANEOUS at 17:17

## 2022-10-11 RX ADMIN — FLUTICASONE FUROATE AND VILANTEROL TRIFENATATE 1 PUFF: 100; 25 POWDER RESPIRATORY (INHALATION) at 08:05

## 2022-10-11 RX ADMIN — APIXABAN 5 MG: 5 TABLET, FILM COATED ORAL at 08:03

## 2022-10-11 RX ADMIN — CARVEDILOL 6.25 MG: 6.25 TABLET, FILM COATED ORAL at 17:17

## 2022-10-11 RX ADMIN — ASPIRIN 81 MG: 81 TABLET, COATED ORAL at 08:03

## 2022-10-11 RX ADMIN — LEVALBUTEROL HYDROCHLORIDE 1.25 MG: 1.25 SOLUTION, CONCENTRATE RESPIRATORY (INHALATION) at 13:41

## 2022-10-11 RX ADMIN — PRAVASTATIN SODIUM 40 MG: 40 TABLET ORAL at 17:17

## 2022-10-11 RX ADMIN — INSULIN ASPART 45 UNITS: 100 INJECTION, SUSPENSION SUBCUTANEOUS at 11:48

## 2022-10-11 RX ADMIN — INSULIN LISPRO 2 UNITS: 100 INJECTION, SOLUTION INTRAVENOUS; SUBCUTANEOUS at 23:36

## 2022-10-11 RX ADMIN — IPRATROPIUM BROMIDE 0.5 MG: 0.5 SOLUTION RESPIRATORY (INHALATION) at 13:41

## 2022-10-11 RX ADMIN — PANTOPRAZOLE SODIUM 40 MG: 40 TABLET, DELAYED RELEASE ORAL at 12:23

## 2022-10-11 RX ADMIN — GABAPENTIN 100 MG: 100 CAPSULE ORAL at 08:03

## 2022-10-11 RX ADMIN — INSULIN ASPART 45 UNITS: 100 INJECTION, SUSPENSION SUBCUTANEOUS at 08:04

## 2022-10-11 RX ADMIN — GABAPENTIN 100 MG: 100 CAPSULE ORAL at 17:17

## 2022-10-11 RX ADMIN — SODIUM CHLORIDE 50 ML/HR: 0.9 INJECTION, SOLUTION INTRAVENOUS at 11:58

## 2022-10-11 RX ADMIN — FUROSEMIDE 40 MG: 40 TABLET ORAL at 08:03

## 2022-10-11 RX ADMIN — LEVALBUTEROL HYDROCHLORIDE 1.25 MG: 1.25 SOLUTION, CONCENTRATE RESPIRATORY (INHALATION) at 19:20

## 2022-10-11 RX ADMIN — INSULIN LISPRO 4 UNITS: 100 INJECTION, SOLUTION INTRAVENOUS; SUBCUTANEOUS at 17:18

## 2022-10-11 RX ADMIN — FERROUS SULFATE TAB 325 MG (65 MG ELEMENTAL FE) 325 MG: 325 (65 FE) TAB at 08:03

## 2022-10-11 RX ADMIN — APIXABAN 5 MG: 5 TABLET, FILM COATED ORAL at 17:17

## 2022-10-11 RX ADMIN — LEVALBUTEROL HYDROCHLORIDE 1.25 MG: 1.25 SOLUTION, CONCENTRATE RESPIRATORY (INHALATION) at 09:30

## 2022-10-11 RX ADMIN — GABAPENTIN 100 MG: 100 CAPSULE ORAL at 23:48

## 2022-10-11 RX ADMIN — ALPRAZOLAM 0.25 MG: 0.25 TABLET ORAL at 01:19

## 2022-10-11 RX ADMIN — INSULIN LISPRO 6 UNITS: 100 INJECTION, SOLUTION INTRAVENOUS; SUBCUTANEOUS at 11:48

## 2022-10-11 RX ADMIN — Medication 3 MG: at 23:38

## 2022-10-11 NOTE — UTILIZATION REVIEW
Initial Clinical Review    Admission: Date/Time/Statement:   Admission Orders (From admission, onward)     Ordered        10/10/22 1047  [address]  Once                      Orders Placed This Encounter   Procedures   • INPATIENT ADMISSION     Standing Status:   Standing     Number of Occurrences:   1     Order Specific Question:   Level of Care     Answer:   Med Surg [16]     Order Specific Question:   Estimated length of stay     Answer:   More than 2 Midnights     Order Specific Question:   Certification     Answer:   I certify that inpatient services are medically necessary for this patient for a duration of greater than two midnights  See H&P and MD Progress Notes for additional information about the patient's course of treatment  ED Arrival Information     Expected   -    Arrival   10/10/2022 08:55    Acuity   Urgent            Means of arrival   Walk-In    Escorted by   Self    Service   Hospitalist    Admission type   Emergency            Arrival complaint   Chest pain/SOB           Chief Complaint   Patient presents with   • Shortness of Breath     Pt has been feeling SOB for about a week, chest pain that feels like pressure w/ upset stomach feeling  Initial Presentation: 78 y o  male , presented to the ED @ [facility], from home, via walk in  Admitted as Inpatient due to  SOB / COPD  Date: 10/10/2022   Presents to the ED with report of SOB/wheezing for 1 week, also reports nonproductive cough  Chest tightness  On home O2 @ 2L via NC  Though elevated probnp, clinically appears to be compensated  No cxr findings of pulm vas congestion, wt lower  than most recent's admission, and minimal LE edema  Upon presentation to the ed, was given IV lasix, IV steroids, nebs with some improvement  Continue O2 @ 2L via NC  Pulmonary toilet  IV steroids  Daily weight   I&O  Monitor on telemetry  [id number] of 6, thus will require a/c  Baseline CKD but does not meet age or wt cutoff  Start on eliquis 5mg bid  Will need confirmation of insurance coverage per   VTE Prophylaxis: Enoxaparin (Lovenox)  / sequential compression device     Day 2: 10/11/2022  COVID/influenza/RSV negative  COPD Exacerbation  Chest x-ray without any acute findings  Continue IV steriods switch to PO 10/12  Maintain O2  BMP in AM   Continue BB, holding diuretic  I&O  Daily weight  Low Na diet         ED Triage Vitals   Temperature Pulse Respirations Blood Pressure SpO2   10/10/22 1015 10/10/22 0910 10/10/22 0910 10/10/22 0910 10/10/22 0910   98 5 °F (36 9 °C) 101 15 137/62 98 %      Temp Source Heart Rate Source Patient Position - Orthostatic VS BP Location FiO2 (%)   10/10/22 1015 10/10/22 0910 10/10/22 0910 10/10/22 0910 --   Oral Monitor Lying Left arm       Pain Score       10/10/22 0944       8          Wt Readings from Last 1 Encounters:   10/11/22 94 6 kg (208 lb 8 9 oz)     Additional Vital Signs:   Date/Time Temp Pulse Resp BP MAP (mmHg) SpO2 Calculated FIO2 (%) - Nasal Cannula Nasal Cannula O2 Flow Rate (L/min) O2 Device Patient Position - Orthostatic VS   10/11/22 1342 -- -- -- -- -- 98 % 28 2 L/min Nasal cannula --   10/11/22 11:26:06 97 8 °F (36 6 °C) 68 16 113/52 72 98 % -- -- -- --   10/11/22 0931 -- -- -- -- -- 95 % 28 2 L/min Nasal cannula --   10/11/22 0809 -- -- -- -- -- -- 28 2 L/min Nasal cannula --   10/11/22 07:41:47 97 3 °F (36 3 °C) Abnormal  71 16 138/66 90 100 % -- -- -- --   10/10/22 22:55:25 97 9 °F (36 6 °C) 62 -- 140/68 92 97 % -- -- -- --   10/10/22 1914 -- -- -- -- -- -- 28 2 L/min Nasal cannula --   10/10/22 18:32:21 -- 77 -- 138/58 85 93 % -- -- -- --   10/10/22 1539 -- -- -- -- -- -- 28 2 L/min Nasal cannula --   10/10/22 13:08:41 97 8 °F (36 6 °C) 90 -- 142/63 89 100 % -- -- -- --     10/10/2022 @ 0921  EC, A Fib, RBBB      Pertinent Labs/Diagnostic Test Results:   XR chest 1 view portable   Final Result by Alfonzo Mcknight MD (10/10 1541)      No acute cardiopulmonary disease  Redemonstration of scar in the right upper lung, right base rounded atelectasis, and small loculated right effusion          Results from last 7 days   Lab Units 10/10/22  1101   SARS-COV-2  Negative     Results from last 7 days   Lab Units 10/11/22  0507 10/10/22  0929   WBC Thousand/uL 6 92 13 31*   HEMOGLOBIN g/dL 10 2* 12 2   HEMATOCRIT % 30 8* 35 4*   PLATELETS Thousands/uL 103* 112*   NEUTROS ABS Thousands/µL  --  11 35*     Results from last 7 days   Lab Units 10/11/22  0507 10/10/22  0929   SODIUM mmol/L 135 135   POTASSIUM mmol/L 4 6 4 4   CHLORIDE mmol/L 104 105   CO2 mmol/L 24 22   ANION GAP mmol/L 7 8   BUN mg/dL 50* 41*   CREATININE mg/dL 2 51* 2 27*   EGFR ml/min/1 73sq m 24 27   CALCIUM mg/dL 8 3 8 5     Results from last 7 days   Lab Units 10/10/22  0929   AST U/L 8   ALT U/L 15   ALK PHOS U/L 87   TOTAL PROTEIN g/dL 7 4   ALBUMIN g/dL 2 8*   TOTAL BILIRUBIN mg/dL 0 29     Results from last 7 days   Lab Units 10/11/22  1125 10/11/22  0628 10/11/22  0323 10/11/22  0105 10/10/22  2252 10/10/22  2033 10/10/22  1830 10/10/22  1655 10/10/22  1153   POC GLUCOSE mg/dl 262* 193* 126 100 283* 480* >500* >500* 484*     Results from last 7 days   Lab Units 10/11/22  0507 10/10/22  0929   GLUCOSE RANDOM mg/dL 165* 359*     Results from last 7 days   Lab Units 10/10/22  0929   HEMOGLOBIN A1C % 9 7*   EAG mg/dl 232     BETA-HYDROXYBUTYRATE   Date Value Ref Range Status   06/15/2019 0 2 <0 6 mmol/L Final      Results from last 7 days   Lab Units 10/10/22  1318 10/10/22  1134 10/10/22  0929   HS TNI 0HR ng/L  --   --  15   HS TNI 2HR ng/L  --  16  --    HSTNI D2 ng/L  --  1  --    HS TNI 4HR ng/L 16  --   --    HSTNI D4 ng/L 1  --   --      Results from last 7 days   Lab Units 10/10/22  0929   TSH 3RD GENERATON uIU/mL 1 100     Results from last 7 days   Lab Units 10/10/22  0929   NT-PRO BNP pg/mL 3,176*     Results from last 7 days   Lab Units 10/10/22  0929   LIPASE u/L 174     Results from last 7 days   Lab Units 10/10/22  1101   INFLUENZA A PCR  Negative   INFLUENZA B PCR  Negative   RSV PCR  Negative     ED Treatment:   Medication Administration from 10/10/2022 0855 to 10/10/2022 1239       Date/Time Order Dose Route Action     10/10/2022 0944 acetaminophen (TYLENOL) tablet 975 mg 975 mg Oral Given     10/10/2022 0945 albuterol inhalation solution 5 mg 5 mg Nebulization Given     10/10/2022 0945 ipratropium (ATROVENT) 0 02 % inhalation solution 1 mg 1 mg Nebulization Given     10/10/2022 1016 methylPREDNISolone sodium succinate (Solu-MEDROL) injection 125 mg 125 mg Intravenous Given     10/10/2022 1042 furosemide (LASIX) injection 40 mg 40 mg Intravenous Given     10/10/2022 1215 insulin lispro (HumaLOG) 100 units/mL subcutaneous injection 2-12 Units 12 Units Subcutaneous Given        Past Medical History:   Diagnosis Date   • Abdominal pain    • MARANDA (acute kidney injury) ([place] [address] ) 6/19/2018   • Cardiac disease    • CHF (congestive heart failure) ([facility])    • COPD, severe ([facility])    • Coronary artery disease    • DDD (degenerative disc disease), lumbar 9/1/2020   • Diabetes mellitus ([facility])    • Dyspnea    • GERD (gastroesophageal reflux disease)    • Hyperlipidemia    • Hypertension    • MI (myocardial infarction) ([place] [address] )     with 3 stents   • Nodule of apex of right lung    • JACQUELINE (obstructive sleep apnea)    • Prostate cancer ([place] [address] )      Present on Admission:  • COPD exacerbation ([place] [address] )  • Chronic diastolic heart failure ([place] [address] )  • Essential hypertension  • Chronic respiratory failure with hypoxia ([facility])  • CKD (chronic kidney disease) stage 3, GFR 30-59 ml/min ([facility])  • CAD (coronary artery disease)      Admitting Diagnosis: Chest pain [R07 9]  Acute exacerbation of CHF (congestive heart failure) ([facility]) [I50 9]  COPD with acute exacerbation ([place] [address] ) [J44 1]  Age/Sex: 78 y o  male  Admission Orders:   CV diet  Up & OOB as tolerated  Daily weight / I&O  Consult PT/OT  Patrick SCDs     Scheduled Medications:  apixaban, 5 mg, Oral, BID  aspirin, 81 mg, Oral, Daily  carvedilol, 6 25 mg, Oral, BID With Meals  ferrous sulfate, 325 mg, Oral, Daily With Breakfast  fluticasone-vilanterol, 1 puff, Inhalation, Daily  gabapentin, 100 mg, Oral, TID  insulin aspart protamine-insulin aspart, 45 Units, Subcutaneous, TID With Meals  insulin lispro, 2-12 Units, Subcutaneous, 4x Daily (AC & HS)  ipratropium, 0 5 mg, Nebulization, TID  levalbuterol, 1 25 mg, Nebulization, TID  pantoprazole, 40 mg, Oral, Early Morning  pravastatin, 40 mg, Oral, After Dinner  [START ON 10/12/2022] predniSONE, 40 mg, Oral, Daily  tamsulosin, 0 4 mg, Oral, After Dinner  umeclidinium bromide, 1 puff, Inhalation, Daily      Continuous IV Infusions:  insulin regular (HumuLIN R,NovoLIN R) 1 Units/mL in sodium chloride 0 9 % 100 mL infusion  Rate: 0 3-21 mL/hr Dose: 0 3-21 Units/hr  Freq: Titrated Route: IV  Last Dose: Stopped (10/11/22 0105)  Start: 10/10/22 2130 End: 10/11/22 0626    PRN Meds:  acetaminophen, 650 mg, Oral, Q6H PRN  cyclobenzaprine, 10 mg, Oral, BID PRN  melatonin, 3 mg, Oral, HS PRN            Network Utilization Review Department  ATTENTION: Please call with any questions or concerns to [phone] and carefully listen to the prompts so that you are directed to the right person  All voicemails are confidential   Ny Lozano all requests for admission clinical reviews, approved or denied determinations and any other requests to dedicated fax number below belonging to the campus where the patient is receiving treatment   List of dedicated fax numbers for the Facilities:  [address] DENIALS (Administrative/Medical Necessity) [phone]   [address] (Maternity/NICU/Pediatrics) [phone]   911 Annabelle Roth [phone]   [facility] [phone]   [facility] [phone]   [address] Steven Graver [phone]   [address] [address] Jackelyn Hoyt [place] [address] [phone] [address] [facility] 005 [facility] [phone]

## 2022-10-11 NOTE — CASE MANAGEMENT
Case Management Assessment & Discharge Planning Note    Patient name Sondra Galvez  Location 2 218/CW2 218-01 MRN [id number]  : 1945 Date 10/11/2022       Current Admission Date: 10/10/2022  Current Admission Diagnosis:SOB (shortness of breath)   Patient Active Problem List    Diagnosis Date Noted   • [facility]) 10/10/2022   • Frequent hospital admissions 2022   • Medication management 2022   • Chest pain, musculoskeletal 2022   • Hypothermia 2022   • Epididymitis, bilateral 06/15/2022   • Acute respiratory failure with hypoxia ([address] ) 2022   • Chronic left-sided low back pain without sciatica 2022   • SOB (shortness of breath) 2022   • CKD (chronic kidney disease) stage 3, GFR 30-59 ml/min ([address] ) 2022   • History of prostate cancer 2022   • Adenocarcinoma of lung ([address] ) 2022   • Fracture of navicular bone of left foot 2021   • Chronic respiratory failure with hypoxia ([address] ) 04/15/2021   • PAD (peripheral artery disease) ([address] ) 2021   • DDD (degenerative disc disease), lumbar 2020   • Anemia, iron deficiency 2020   • Lung nodule 2020   • Pulmonary hypertension ([address] ) 2019   • JACQUELINE (obstructive sleep apnea) 2019   • COPD with acute exacerbation ([address] ) 2019   • Oxygen dependent 2018   • Acute metabolic encephalopathy    • RBBB 2018   • Acute on chronic diastolic (congestive) heart failure 2018   • CAD (coronary artery disease) 2018   • Chronic diastolic heart failure ([address] ) 2018   • Pleural effusion on right 10/31/2017   • COPD, severe ([address] ) 2017   • Diabetic neuropathy ([address] ) 2017   • Essential hypertension 2016   • Venous stasis dermatitis of both lower extremities 11/15/2016   • Type 2 diabetes mellitus with hyperglycemia, with long-term current use of insulin ([facility] ) 2016   • COPD exacerbation ([facility] ) 2016   • HLD (hyperlipidemia) 01/20/2016      LOS (days): 1  Geometric Mean LOS (GMLOS) (days):   Days to GMLOS:     OBJECTIVE:    Risk of Unplanned Readmission Score: 65 42         Current admission status: Inpatient       Preferred Pharmacy:   [address] [facility], [address] [facility] [address] [place] [address]  Phone: [phone] Fax: [phone] [place], [place] - [address] [facility] [address] [facility] [address] [address]  Phone: [phone] Fax: [phone]    Primary Care Provider: Walter Polk MD    Primary Insurance: [facility]  Secondary Insurance:     ASSESSMENT:  [address] Representative - Son   Primary Phone: [phone] (Mobile)  Home Phone: [phone]                  Readmission Root Cause  30 Day Readmission: No    Patient Information  Admitted from[de-identified] Home  Mental Status: Alert  During Assessment patient was accompanied by: Not accompanied during assessment  Assessment information provided by[de-identified] Patient  Primary Caregiver: Self  Support Systems: Self, [facility] of Residence: [address] [facility] do you live in?: [facility] entry access options   Select all that apply : No steps to enter home  Type of Current Residence: 2 story home  Upon entering residence, is there a bedroom on the main floor (no further steps)?: Yes  Upon entering residence, is there a bathroom on the main floor (no further steps)?: Yes  In the last 12 months, was there a time when you were not able to pay the mortgage or rent on time?: No  In the last 12 months, how many places have you lived?: 1  In the last 12 months, was there a time when you did not have a steady place to sleep or slept in a shelter (including now)?: No  Homeless/housing insecurity resource given?: No  Living Arrangements: Lives w/ Son  Is patient a ?: No    Activities of Daily Living Prior to Admission  Functional Status: Independent  Completes ADLs independently?: Yes  Ambulates independently?: Yes  Does patient use assisted devices?: Yes  Assisted Devices (DME) used: Home Oxygen concentrator, Portable Oxygen concentrator, Portable Oxygen tanks, Shower Chair, Nora Vernon  DME Company Name (respiratory supplies): [employer]  Does patient currently own DME?: Yes  What DME does the patient currently own?: Home Oxygen concentrator, Portable Oxygen concentrator, Portable Oxygen tanks, Shower Chair, Straight Cane, Walker  Does patient have a history of Outpatient Therapy (PT/OT)?: Yes  Does the patient have a history of Short-Term Rehab?: No  Does patient have a history of HHC?: No  Does patient currently have [employer] [address] [employer]?: No         Patient Information Continued  Income Source: Pension/alf  Does patient have prescription coverage?: Yes  Within the past 12 months, you worried that your food would run out before you got the money to buy more : Never true  Within the past 12 months, the food you bought just didn't last and you didn't have money to get more : Never true  Food insecurity resource given?: No  Does patient receive dialysis treatments?: No  Does patient have a history of substance abuse?: No  Does patient have a history of Mental Health Diagnosis?: No         Means of Transportation  Means of Transport to Appts[de-identified] Drives Self  In the past 12 months, has lack of transportation kept you from medical appointments or from getting medications?: No  In the past 12 months, has lack of transportation kept you from meetings, work, or from getting things needed for daily living?: No        DISCHARGE DETAILS:    Discharge planning discussed with[de-identified] Pt  Freedom of Choice: Yes     CM contacted family/caregiver?: No- see comments (Pt was alert and oriented)        Treatment Team Recommendation: Home  Discharge Destination Plan[de-identified] Home  Transport at Discharge : Family      Accompanied by: Family member      Additional Comments: CM spoke to pt at bed side   Pt was alert and oriented  Pt said his son would be able to pick him up at time of d/c  Pt ask CM if he was able to get a rollator, CM informed the pt that CM would look into the rollator for him and see if his insurance would cover it  Insurance will not cover the cost of the rollator due to pt getting a walker recently  CM will follow up with pt and provide him with information on out of pocket cost for the rollator  CM called and spoke to [facility] at [facility] to get information on his copayment for Eliquis  CM spoke to the pharmacy, copayment would be $9 58  CM informed  Attending Shanika Pacheco of the copayment for Yue

## 2022-10-11 NOTE — ASSESSMENT & PLAN NOTE
· Had chest pain on admission with negative troponins and nonischemic EKG  · Continue beta-blocker, aspirin, statin  · Of note had stress test in May which was negative for ischemia

## 2022-10-11 NOTE — PROGRESS NOTES
[facility]  Progress Note - Jaja Every Sr  1945, 78 y o  male MRN: [id number]  Unit/Bed#: CW2 218-01 Encounter: [id number]  Primary Care Provider: Starr Li MD   Date and time admitted to hospital: 10/10/2022  9:01 AM    * SOB (shortness of breath)  Assessment & Plan  Admitted with shortness of breath and wheezing as well as increasing oxygen requirements  · COVID/influenza/RSV negative  · Chest x-ray without any acute findings  · Improved with intravenous steroids, treated as COPD exacerbation, switch to oral prednisone for quick taper  · Rest of plan as below      CKD (chronic kidney disease) stage 3, GFR 30-59 ml/min [facility])  Assessment & Plan  Lab Results   Component Value Date    EGFR 24 10/11/2022    EGFR 27 10/10/2022    EGFR 26 09/17/2022    CREATININE 2 51 (H) 10/11/2022    CREATININE 2 27 (H) 10/10/2022    CREATININE 2 31 (H) 09/17/2022   · MARANDA on CKD, prior baseline appears to be 1 5-1 7, 2 27 on admission, worse today at 2 51   · Holding Lasix and losartan  · Gentle intravenous hydration  · Check PVR, do no issues with urinary retention in the past   · Avoid nephrotoxins and hypotension  · BMP in a m  COPD exacerbation ([facility] [address] )  Assessment & Plan  · Plan as above      Chronic diastolic heart failure (HCC)  Assessment & Plan  Wt Readings from Last 3 Encounters:   10/11/22 94 6 kg (208 lb 8 9 oz)   09/17/22 101 kg (222 lb)   09/02/22 107 kg (235 lb 8 3 oz)   · Appears euvolemic to dry on exam   · Continue beta-blocker, holding diuretic secondary to above  · I&Os, daily weights, low-sodium diet  · Monitor volume status    A-fib ([facility] [address] )  Assessment & Plan  Noted in prior EKG's as well but w/o formal evaluation per cardiology, Presenting EKG reading confirmed per cardiology  · Continue Coreg  · [id number] of 6, will require anticoagulation  Started on Eliquis, case management to price check    · Outpatient follow-up with Cardiology       Type 2 diabetes mellitus with hyperglycemia, with long-term current use of insulin [facility])  Assessment & Plan  Lab Results   Component Value Date    HGBA1C 9 7 (H) 10/10/2022       Recent Labs     10/10/22  2252 10/11/22  0105 10/11/22  0323 10/11/22  0628   POCGLU 283* 100 126 193*     Hx of poor control  · Hyperglycemic on admission requiring insulin gtt overnight, improved  · Home regimen 75/25 tid with meals continued, continue sliding scale  · Continue carb control diet  · PCP manages insulin, Recommend patient to follow up with endocrinology outpatient    Essential hypertension  Assessment & Plan  · Blood pressure acceptable  Chronic respiratory failure with hypoxia (HCC)  Assessment & Plan  · Wears 2 L PRN at baseline        CAD (coronary artery disease)  Assessment & Plan  · Had chest pain on admission with negative troponins and nonischemic EKG  · Continue beta-blocker, aspirin, statin  · Of note had stress test in May which was negative for ischemia  VTE Pharmacologic Prophylaxis: VTE Score: 3 High Risk (Score >/= 5) - Pharmacological DVT Prophylaxis Ordered: apixaban (Eliquis)  Sequential Compression Devices Ordered  Patient Centered Rounds: I performed bedside rounds with nursing staff today  Discussions with Specialists or Other Care Team Provider: none    Education and Discussions with Family / Patient: Patient declined call to   Time Spent for Care: 30 minutes  More than 50% of total time spent on counseling and coordination of care as described above  Current Length of Stay: 1 day(s)    Current Patient Status: Inpatient     Certification Statement: The patient will continue to require additional inpatient hospital stay due to MARANDA     Discharge Plan: Anticipate discharge in 24-48 hrs to home with home services  Code Status: Level 1 - Full Code    Subjective:   Denies shortness of breath or chest pain  Wheezing has resolved  Complains of some nausea but no vomiting    No diarrhea or abdominal pain  Had normal bowel movement yesterday  Reports that he has been urinating without difficulty  Objective:     Vitals:   Temp (24hrs), Av 7 °F (36 5 °C), Min:97 3 °F (36 3 °C), Max:97 9 °F (36 6 °C)    Temp:  [97 3 °F (36 3 °C)-97 9 °F (36 6 °C)] 97 8 °F (36 6 °C)  HR:  [62-90] 68  Resp:  [16] 16  BP: (113-142)/(52-68) 113/52  SpO2:  [93 %-100 %] 98 %  Body mass index is 28 29 kg/m²  Input and Output Summary (last 24 hours): Intake/Output Summary (Last 24 hours) at 10/11/2022 1158  Last data filed at 10/11/2022 0844  Gross per 24 hour   Intake 240 ml   Output 1530 ml   Net -1290 ml       Physical Exam:   Physical Exam  Vitals and nursing note reviewed  Constitutional:       Appearance: He is ill-appearing  Interventions: Nasal cannula in place  HENT:      Nose: Nose normal    Eyes:      Pupils: Pupils are equal, round, and reactive to light  Cardiovascular:      Rate and Rhythm: Normal rate  Rhythm irregular  Heart sounds: No murmur heard  Pulmonary:      Effort: No respiratory distress  Breath sounds: Decreased breath sounds present  Chest:      Chest wall: No tenderness  Abdominal:      General: Bowel sounds are normal  There is no distension  Palpations: Abdomen is soft  Tenderness: There is no abdominal tenderness  Musculoskeletal:      Comments: trace edema B/L LE, at baseline per patient  Skin:     General: Skin is warm and dry  Coloration: Skin is pale  Comments: PVD discoloration B/L LE   Neurological:      Mental Status: He is alert and oriented to person, place, and time  Mental status is at baseline  Psychiatric:         Mood and Affect: Mood normal  Affect is flat           Additional Data:     Labs:  Results from last 7 days   Lab Units 10/11/22  0507 10/10/22  0929   WBC Thousand/uL 6 92 13 31*   HEMOGLOBIN g/dL 10 2* 12 2   HEMATOCRIT % 30 8* 35 4*   PLATELETS Thousands/uL 103* 112*   NEUTROS PCT %  --  85* LYMPHS PCT %  --  6*   MONOS PCT %  --  7   EOS PCT %  --  1     Results from last 7 days   Lab Units 10/11/22  0507 10/10/22  0929   SODIUM mmol/L 135 135   POTASSIUM mmol/L 4 6 4 4   CHLORIDE mmol/L 104 105   CO2 mmol/L 24 22   BUN mg/dL 50* 41*   CREATININE mg/dL 2 51* 2 27*   ANION GAP mmol/L 7 8   CALCIUM mg/dL 8 3 8 5   ALBUMIN g/dL  --  2 8*   TOTAL BILIRUBIN mg/dL  --  0 29   ALK PHOS U/L  --  87   ALT U/L  --  15   AST U/L  --  8   GLUCOSE RANDOM mg/dL 165* 359*         Results from last 7 days   Lab Units 10/11/22  1125 10/11/22  0628 10/11/22  0323 10/11/22  0105 10/10/22  2252 10/10/22  2033 10/10/22  1830 10/10/22  1655 10/10/22  1153   POC GLUCOSE mg/dl 262* 193* 126 100 283* 480* >500* >500* 484*     Results from last 7 days   Lab Units 10/10/22  0929   HEMOGLOBIN A1C % 9 7*           Lines/Drains:  Invasive Devices  Report    Peripheral Intravenous Line  Duration           Peripheral IV 10/10/22 Right Forearm 1 day                  Telemetry:  Telemetry Orders (From admission, onward)             48 Hour Telemetry Monitoring  Continuous x 48 hours        References:    Telemetry Guidelines   Question:  Reason for 48 Hour Telemetry  Answer:  Arrhythmias Requiring Medical Therapy (eg  SVT, Vtach/fib, Bradycardia, Uncontrolled A-fib)                 Telemetry Reviewed: Atrial fibrillation   HR averaging   Indication for Continued Telemetry Use: Arrthymias requiring medical therapy             Imaging: Personally reviewed the following imaging: chest xray, ECHO    Recent Cultures (last 7 days):         Last 24 Hours Medication List:   Current Facility-Administered Medications   Medication Dose Route Frequency Provider Last Rate   • acetaminophen  650 mg Oral Q6H PRN Augusto Oscoda, DO     • apixaban  5 mg Oral BID YOLANDE Fiore     • aspirin  81 mg Oral Daily Augusto Oscoda, DO     • carvedilol  6 25 mg Oral BID With Meals Augusto Oscoda, DO     • cyclobenzaprine 10 mg Oral BID PRN Montey Bruins, DO     • ferrous sulfate  325 mg Oral Daily With Breakfast Montey Bruins, DO     • fluticasone-vilanterol  1 puff Inhalation Daily Montey Bruins, DO     • gabapentin  100 mg Oral TID Montey Bruins, DO     • insulin aspart protamine-insulin aspart  45 Units Subcutaneous TID With Meals Nile Hanson PA-C     • insulin lispro  2-12 Units Subcutaneous 4x Daily (AC & HS) Nile Hanson PA-C     • ipratropium  0 5 mg Nebulization TID Montey Bruins, DO     • levalbuterol  1 25 mg Nebulization TID Montey Bruins, DO     • melatonin  3 mg Oral HS PRN Nile Hanson PA-C     • pantoprazole  40 mg Oral Early Morning YOLANDE Fiore     • pravastatin  40 mg Oral After Anabella Prier, DO     • predniSONE  40 mg Oral Daily YOLANDE Fiore     • sodium chloride  50 mL/hr Intravenous Once YOLANDE Gifford     • tamsulosin  0 4 mg Oral After Anabella Prier, DO     • umeclidinium bromide  1 puff Inhalation Daily Montey Bruins, DO          Today, Patient Was Seen By: Julia Escoto    **Please Note: This note may have been constructed using a voice recognition system  **

## 2022-10-12 LAB
ANION GAP SERPL CALCULATED.3IONS-SCNC: 6 MMOL/L (ref 4–13)
BACTERIA UR QL AUTO: NORMAL /HPF
BILIRUB UR QL STRIP: NEGATIVE
BUN SERPL-MCNC: 70 MG/DL (ref 5–25)
CALCIUM SERPL-MCNC: 8.5 MG/DL (ref 8.3–10.1)
CHLORIDE SERPL-SCNC: 104 MMOL/L (ref 96–108)
CLARITY UR: CLEAR
CO2 SERPL-SCNC: 25 MMOL/L (ref 21–32)
COLOR UR: COLORLESS
CREAT SERPL-MCNC: 2.84 MG/DL (ref 0.6–1.3)
GFR SERPL CREATININE-BSD FRML MDRD: 20 ML/MIN/1.73SQ M
GLUCOSE SERPL-MCNC: 108 MG/DL (ref 65–140)
GLUCOSE SERPL-MCNC: 119 MG/DL (ref 65–140)
GLUCOSE SERPL-MCNC: 149 MG/DL (ref 65–140)
GLUCOSE SERPL-MCNC: 173 MG/DL (ref 65–140)
GLUCOSE SERPL-MCNC: 93 MG/DL (ref 65–140)
GLUCOSE UR STRIP-MCNC: NEGATIVE MG/DL
HGB UR QL STRIP.AUTO: NEGATIVE
KETONES UR STRIP-MCNC: NEGATIVE MG/DL
LEUKOCYTE ESTERASE UR QL STRIP: NEGATIVE
NITRITE UR QL STRIP: NEGATIVE
NON-SQ EPI CELLS URNS QL MICRO: NORMAL /HPF
PH UR STRIP.AUTO: 5 [PH]
POTASSIUM SERPL-SCNC: 4.3 MMOL/L (ref 3.5–5.3)
PROT UR STRIP-MCNC: NEGATIVE MG/DL
RBC #/AREA URNS AUTO: NORMAL /HPF
SODIUM SERPL-SCNC: 135 MMOL/L (ref 135–147)
SP GR UR STRIP.AUTO: 1.01 (ref 1–1.03)
UROBILINOGEN UR STRIP-ACNC: <2 MG/DL
WBC #/AREA URNS AUTO: NORMAL /HPF

## 2022-10-12 PROCEDURE — 81001 URINALYSIS AUTO W/SCOPE: CPT | Performed by: NURSE PRACTITIONER

## 2022-10-12 PROCEDURE — 94664 DEMO&/EVAL PT USE INHALER: CPT

## 2022-10-12 PROCEDURE — 94640 AIRWAY INHALATION TREATMENT: CPT

## 2022-10-12 PROCEDURE — 99223 1ST HOSP IP/OBS HIGH 75: CPT | Performed by: INTERNAL MEDICINE

## 2022-10-12 PROCEDURE — 82948 REAGENT STRIP/BLOOD GLUCOSE: CPT

## 2022-10-12 PROCEDURE — 97163 PT EVAL HIGH COMPLEX 45 MIN: CPT

## 2022-10-12 PROCEDURE — 99232 SBSQ HOSP IP/OBS MODERATE 35: CPT | Performed by: NURSE PRACTITIONER

## 2022-10-12 PROCEDURE — 97167 OT EVAL HIGH COMPLEX 60 MIN: CPT

## 2022-10-12 PROCEDURE — 80048 BASIC METABOLIC PNL TOTAL CA: CPT | Performed by: NURSE PRACTITIONER

## 2022-10-12 PROCEDURE — 94760 N-INVAS EAR/PLS OXIMETRY 1: CPT

## 2022-10-12 RX ORDER — SODIUM CHLORIDE 9 MG/ML
50 INJECTION, SOLUTION INTRAVENOUS CONTINUOUS
Status: DISPENSED | OUTPATIENT
Start: 2022-10-12 | End: 2022-10-12

## 2022-10-12 RX ADMIN — UMECLIDINIUM 1 PUFF: 62.5 AEROSOL, POWDER ORAL at 09:09

## 2022-10-12 RX ADMIN — CARVEDILOL 6.25 MG: 6.25 TABLET, FILM COATED ORAL at 16:31

## 2022-10-12 RX ADMIN — ASPIRIN 81 MG: 81 TABLET, COATED ORAL at 09:09

## 2022-10-12 RX ADMIN — CARVEDILOL 6.25 MG: 6.25 TABLET, FILM COATED ORAL at 09:09

## 2022-10-12 RX ADMIN — LEVALBUTEROL HYDROCHLORIDE 1.25 MG: 1.25 SOLUTION, CONCENTRATE RESPIRATORY (INHALATION) at 14:52

## 2022-10-12 RX ADMIN — INSULIN ASPART 45 UNITS: 100 INJECTION, SUSPENSION SUBCUTANEOUS at 06:51

## 2022-10-12 RX ADMIN — APIXABAN 5 MG: 5 TABLET, FILM COATED ORAL at 09:09

## 2022-10-12 RX ADMIN — GABAPENTIN 100 MG: 100 CAPSULE ORAL at 21:41

## 2022-10-12 RX ADMIN — PRAVASTATIN SODIUM 40 MG: 40 TABLET ORAL at 17:39

## 2022-10-12 RX ADMIN — FLUTICASONE FUROATE AND VILANTEROL TRIFENATATE 1 PUFF: 100; 25 POWDER RESPIRATORY (INHALATION) at 09:09

## 2022-10-12 RX ADMIN — SODIUM CHLORIDE 50 ML/HR: 0.9 INJECTION, SOLUTION INTRAVENOUS at 13:06

## 2022-10-12 RX ADMIN — TAMSULOSIN HYDROCHLORIDE 0.4 MG: 0.4 CAPSULE ORAL at 17:39

## 2022-10-12 RX ADMIN — IPRATROPIUM BROMIDE 0.5 MG: 0.5 SOLUTION RESPIRATORY (INHALATION) at 21:20

## 2022-10-12 RX ADMIN — LEVALBUTEROL HYDROCHLORIDE 1.25 MG: 1.25 SOLUTION, CONCENTRATE RESPIRATORY (INHALATION) at 21:20

## 2022-10-12 RX ADMIN — FERROUS SULFATE TAB 325 MG (65 MG ELEMENTAL FE) 325 MG: 325 (65 FE) TAB at 09:09

## 2022-10-12 RX ADMIN — PANTOPRAZOLE SODIUM 40 MG: 40 TABLET, DELAYED RELEASE ORAL at 06:51

## 2022-10-12 RX ADMIN — INSULIN ASPART 45 UNITS: 100 INJECTION, SUSPENSION SUBCUTANEOUS at 16:31

## 2022-10-12 RX ADMIN — APIXABAN 5 MG: 5 TABLET, FILM COATED ORAL at 17:39

## 2022-10-12 RX ADMIN — IPRATROPIUM BROMIDE 0.5 MG: 0.5 SOLUTION RESPIRATORY (INHALATION) at 14:52

## 2022-10-12 RX ADMIN — LEVALBUTEROL HYDROCHLORIDE 1.25 MG: 1.25 SOLUTION, CONCENTRATE RESPIRATORY (INHALATION) at 08:39

## 2022-10-12 RX ADMIN — IPRATROPIUM BROMIDE 0.5 MG: 0.5 SOLUTION RESPIRATORY (INHALATION) at 08:39

## 2022-10-12 RX ADMIN — PREDNISONE 40 MG: 20 TABLET ORAL at 09:09

## 2022-10-12 RX ADMIN — GABAPENTIN 100 MG: 100 CAPSULE ORAL at 16:30

## 2022-10-12 RX ADMIN — INSULIN ASPART 45 UNITS: 100 INJECTION, SUSPENSION SUBCUTANEOUS at 12:25

## 2022-10-12 RX ADMIN — INSULIN LISPRO 2 UNITS: 100 INJECTION, SOLUTION INTRAVENOUS; SUBCUTANEOUS at 21:41

## 2022-10-12 RX ADMIN — GABAPENTIN 100 MG: 100 CAPSULE ORAL at 09:09

## 2022-10-12 NOTE — CONSULTS
Consultation - Nephrology   Michelle Singleton Sr  78 y o  male MRN: [id number]  Unit/Bed#: CW2 218-01 Encounter: [id number]    ASSESSMENT and PLAN:  Acute kidney injury:  Etiology: Suspect prerenal in the setting of diuretics, varying BP, prior MARANDA episodes and loss of autoregulatory system with Losartan  Assessment:  • After review medical records through [facility] everywhere baseline creatinine 1 4-1 7 however has had prior MARANDA episodes and concern for progression  • Admission creatinine 2 27  • Last outpatient creatinine 9/17/2022 was 2 31  • Current creatinine 2 84>2 51  • Acid base and lytes stable   • Off Losartan  • Clinically, patient is not uremic and there is no acute indication for renal replacement therapy  Workup:  · Nothing currently  Plan:  • Strict I/Os, daily weights  • Avoid nephrotoxins, NSAIDs, IV contrast if possible  • Avoid hypotension and pertubation of BP to prevent decreased renal fucntion  • Adjust meds to appropriate GFR  • Optimize hemodynamic status to avoid delay in renal recovery  • Check U/A with Micro  • Check bladder scan  • Will give  ml over 10 hours  • Check BMP in am    Chronic kidney disease IIIB:    Etiology:  Suspect secondary to Diabetic kidney disease, hypertension nephrosclerosis, chronic COPD and age related nephron loss  Assessment and Plan:  • After review of medical records for [facility] and [facility] everywhere baseline creatinine 1 4-1 7  • Previously followed Dr Chriss Banks; last seen in 2018   Was to see Dr Alonso Teague but was no show  • Recommend follow-up on discharge for long-term management    Blood pressure/Hypertension:  Assessment and Plan:  • Current blood pressure 120's currenlty  • Home medications:Coreg 6 25 mg 2 times daily, Furosemide 40 mg daily, Losartan 50 mg dialy  • Current medications: COreg 6 25 mg 2 times daily  • Maximize hemodynamics to maintain MAP >65  • Avoid hypotension or fluctuations in blood pressure  • Will continue to trend    Anemia: Likely of CKD  Assessment and Plan:  • Current hemoglobin 10 2  • Previous iron stores low  • On oral iron outpatient  • recheck iron stores  • Per primary team  • Transfuse for Hgb <7 0    COPD exacerbation  Assessment and Plan:  • On home O2-uses intermittently  • Treated with IV steroids, now on oral prednisone  • Consider pulmonary consult if worsens    Chronic Diastolic Heart Failure  Assessment and Plan:  • Examining volume depleted  • Chest xray with outvascular congestion  • Continue to hold Furosemide  • Give 500 ml NSS at 50 ml/hour for 10 hours    Other medical issues:  IDDM-management per primary care  A-Fib on Coreg  CAD  Lung cancer s/p radiation-chest xray reports RUL scaring and persistent small loculated effusion-? Causing CP   prostate cancer s/p brachytherapy,      HISTORY OF PRESENT ILLNESS:  Requesting Physician: Gopi Kirk MD  Reason for Consult: MARANDA on CKD    Alyssa Piña Sr  is a 78 y o  male with past medical history of CKD IIIB, prior MARANDA episodes, CHF, COPD on chronic O2 NC, IDDM, HLD, HTN, JACQUELINE, prostate cancer s/p brachytherapy, right lung cancer s/p radiation, hx of non compliance, who presented to ER with SOB and chest tightness: S/P IV lasix x 1  Since admission creatinine has increased and a renal consultation is requested today for assistance in the management of MARANDA  On discussion,  He feeling okay  Reported bloody nose last night; now dry  Reported NP cough  Doesn't remember seeing a nephrologist as outpatient in past        PAST MEDICAL HISTORY:  Past Medical History:   Diagnosis Date   • Abdominal pain    • [facility]DA [facility] 6/19/2018   • Cardiac disease    • CHF (congestive heart failure) (HCC)    • COPD, severe (HCC)    • Coronary artery disease    • DDD (degenerative disc disease), lumbar 9/1/2020   • Diabetes mellitus (HCC)    • Dyspnea    • GERD (gastroesophageal reflux disease)    • Hyperlipidemia    • Hypertension    • MI (myocardial infarction) ([facility] [address] ) with 3 stents   • Nodule of apex of right lung    • JACQUELINE (obstructive sleep apnea)    • Prostate cancer ([address] )        PAST SURGICAL HISTORY:  Past Surgical History:   Procedure Laterality Date   • ABDOMINAL SURGERY      exploratory   • ANGIOPLASTY      3 stents   • APPENDECTOMY     • COLONOSCOPY     • CT NEEDLE BIOPSY LUNG  11/3/2020   • ESOPHAGOGASTRODUODENOSCOPY N/A 10/2/2017    Procedure: ESOPHAGOGASTRODUODENOSCOPY (EGD); Surgeon: Kusum Yun MD;  Location: [facility];   Service: Gastroenterology   • IR THORACENTESIS  11/3/2020   • KNEE CARTILAGE SURGERY     • OTHER SURGICAL HISTORY      stent placement   • PROSTATE SURGERY     • SKIN GRAFT      Basal cell CA back       ALLERGIES:  Allergies   Allergen Reactions   • Crestor [Rosuvastatin] Other (See Comments)     Unknown     • Lisinopril GI Intolerance, Dizziness and Abdominal Pain   • Metformin GI Intolerance       SOCIAL HISTORY:  Social History     Substance and Sexual Activity   Alcohol Use Never     Social History     Substance and Sexual Activity   Drug Use No     Social History     Tobacco Use   Smoking Status Former Smoker   • Packs/day: 1 50   • Years: 50 00   • Pack years: 75 00   • Start date: 38   • Quit date: 2020   • Years since quittin 2   Smokeless Tobacco Never Used       FAMILY HISTORY:  Family History   Problem Relation Age of Onset   • Heart disease Father    • Other Father         Mesothelioma        MEDICATIONS:    Current Facility-Administered Medications:   •  acetaminophen (TYLENOL) tablet 650 mg, 650 mg, Oral, Q6H PRN, Karen Money, DO  •  apixaban (ELIQUIS) tablet 5 mg, 5 mg, Oral, BID, Sangeetha M Malou, CRNP, 5 mg at 10/12/22 0909  •  aspirin (ECOTRIN LOW STRENGTH) EC tablet 81 mg, 81 mg, Oral, Daily, Karen Money, DO, 81 mg at 10/12/22 0909  •  carvedilol (COREG) tablet 6 25 mg, 6 25 mg, Oral, BID With Meals, Karen Money, DO, 6 25 mg at 10/12/22 0909  •  cyclobenzaprine (FLEXERIL) tablet 10 mg, 10 mg, Oral, BID PRN, Edi Gaming DO  •  ferrous sulfate tablet 325 mg, 325 mg, Oral, Daily With Breakfast, Edi Gaming DO, 325 mg at 10/12/22 0909  •  fluticasone-vilanterol (BREO ELLIPTA) 100-25 mcg/inh inhaler 1 puff, 1 puff, Inhalation, Daily, Edi Gaming DO, 1 puff at 10/12/22 0909  •  gabapentin (NEURONTIN) capsule 100 mg, 100 mg, Oral, TID, Edi Gaming DO, 100 mg at 10/12/22 0909  •  insulin aspart protamine-insulin aspart (NovoLOG 70/30) 100 units/mL subcutaneous injection 45 Units, 45 Units, Subcutaneous, TID With Meals, Leander Cabrera PA-C, 45 Units at 10/12/22 0651  •  insulin lispro (HumaLOG) 100 units/mL subcutaneous injection 2-12 Units, 2-12 Units, Subcutaneous, 4x Daily (AC & HS), 2 Units at 10/11/22 2336 **AND** Fingerstick Glucose (POCT), , , 4x Daily AC and at bedtime, Leander Cabrera PA-C  •  ipratropium (ATROVENT) 0 02 % inhalation solution 0 5 mg, 0 5 mg, Nebulization, TID, Edi Gaming DO, 0 5 mg at 10/12/22 2135  •  levalbuterol (XOPENEX) inhalation solution 1 25 mg, 1 25 mg, Nebulization, TID, Edi Gaming DO, 1 25 mg at 10/12/22 4160  •  melatonin tablet 3 mg, 3 mg, Oral, HS PRN, Leander Cabrera PA-C, 3 mg at 10/11/22 2338  •  pantoprazole (PROTONIX) EC tablet 40 mg, 40 mg, Oral, Early Morning, YOLANDE Fiore, 40 mg at 10/12/22 4721  •  pravastatin (PRAVACHOL) tablet 40 mg, 40 mg, Oral, After [place] Spell, DO, 40 mg at 10/11/22 1717  •  predniSONE tablet 40 mg, 40 mg, Oral, Daily, YOLANDE Fiore, 40 mg at 10/12/22 0909  •  tamsulosin (FLOMAX) capsule 0 4 mg, 0 4 mg, Oral, After Dinner, Edi Angers, DO, 0 4 mg at 10/11/22 1717  •  umeclidinium bromide (INCRUSE ELLIPTA) 62 5 mcg/inh inhaler AEPB 1 puff, 1 puff, Inhalation, Daily, Edimonserrat Garciass, DO, 1 puff at 10/12/22 0909    REVIEW OF SYSTEMS:  Patient seen and examined at bedside  Review of Systems Constitutional: Positive for fatigue  HENT: Positive for nosebleeds  Reported nose bleed last night  Now dry nares   Eyes: Negative  Respiratory: Positive for cough  Non productive   Cardiovascular: Positive for leg swelling  Gastrointestinal: Negative  Endocrine: Negative  Genitourinary: Negative  Musculoskeletal: Negative  Skin: Negative  Allergic/Immunologic: Negative  Neurological: Negative  Hematological: Negative  Psychiatric/Behavioral: Negative              PHYSICAL EXAM:  Current Weight: Weight - Scale: 95 2 kg (209 lb 12 8 oz)  First Weight: Weight - Scale: 94 8 kg (209 lb)  Vitals:    10/12/22 0334 10/12/22 0600 10/12/22 0817 10/12/22 0841   BP: 124/53  118/74    Pulse: 72  73    Resp: 20  20    Temp: (!) 97 3 °F (36 3 °C)  98 7 °F (37 1 °C)    TempSrc:       SpO2: 95%  (!) 76% 92%   Weight:  95 2 kg (209 lb 12 8 oz)         Intake/Output Summary (Last 24 hours) at 10/12/2022 1153  Last data filed at 10/12/2022 7286  Gross per 24 hour   Intake 920 ml   Output 2700 ml   Net -1780 ml       Physical Exam           Lab Results:   Results from last 7 days   Lab Units 10/12/22  0910 10/11/22  0507 10/10/22  0929   WBC Thousand/uL  --  6 92 13 31*   HEMOGLOBIN g/dL  --  10 2* 12 2   HEMATOCRIT %  --  30 8* 35 4*   PLATELETS Thousands/uL  --  103* 112*   SODIUM mmol/L 135 135 135   POTASSIUM mmol/L 4 3 4 6 4 4   CHLORIDE mmol/L 104 104 105   CO2 mmol/L 25 24 22   BUN mg/dL 70* 50* 41*   CREATININE mg/dL 2 84* 2 51* 2 27*   CALCIUM mg/dL 8 5 8 3 8 5   ALK PHOS U/L  --   --  87   ALT U/L  --   --  15   AST U/L  --   --  8

## 2022-10-12 NOTE — PROGRESS NOTES
[facility]  Progress Note - Marcy Salinas Sr  1945, 78 y o  male MRN: [id number]  Unit/Bed#: [facility] 218-01 Encounter: [id number]  Primary Care Provider: Jerlene Sacks, MD   Date and time admitted to hospital: 10/10/2022  9:01 AM    * SOB (shortness of breath)  Assessment & Plan  Admitted with shortness of breath and wheezing as well as increasing oxygen requirements  Met SIRS criteria on admission, infectious etiologies ruled out  · COVID/influenza/RSV negative  · Chest x-ray without any acute findings  · Improved with intravenous steroids, treated as COPD exacerbation, switched to oral prednisone 10/11 to complete 5 day course  · Rest of plan as below      CKD (chronic kidney disease) stage 3, GFR 30-59 ml/min [facility])  Assessment & Plan  Lab Results   Component Value Date    EGFR 20 10/12/2022    EGFR 24 10/11/2022    EGFR 27 10/10/2022    CREATININE 2 84 (H) 10/12/2022    CREATININE 2 51 (H) 10/11/2022    CREATININE 2 27 (H) 10/10/2022   · MARANDA on CKD, prior baseline appears to be 1 5-1 7, 2 27 on admission, worse today at 2 84 despite holding diuretics and giving gentle IV hydration  · Holding Lasix and losartan  · PVR negative  · Consult nephrology   · Avoid nephrotoxins and hypotension  · BMP in a m  COPD exacerbation ([facility] [address] )  Assessment & Plan  · Plan as above      Chronic diastolic heart failure (HCC)  Assessment & Plan  Wt Readings from Last 3 Encounters:   10/12/22 95 2 kg (209 lb 12 8 oz)   09/17/22 101 kg (222 lb)   09/02/22 107 kg (235 lb 8 3 oz)   · Appears euvolemic to dry on exam   · Continue beta-blocker, holding diuretic secondary to above  · I&Os, daily weights, low-sodium diet  · Monitor volume status    A-fib ([facility] [address] )  Assessment & Plan  Noted in prior EKG's as well but w/o formal evaluation per cardiology, Presenting EKG reading confirmed afib per cardiology  · Continue Coreg  · [id number] of 6, will require anticoagulation    Started on Eliquis, $10 per month, script sent to outpatient pharmacy  · Will need outpatient follow-up with Cardiology       Type 2 diabetes mellitus with hyperglycemia, with long-term current use of insulin [facility])  Assessment & Plan  Lab Results   Component Value Date    HGBA1C 9 7 (H) 10/10/2022       Recent Labs     10/11/22  1125 10/11/22  1557 10/11/22  2316 10/12/22  0629   POCGLU 262* 209* 160* 119     Hx of poor control  · Hyperglycemic on admission requiring insulin gtt overnight, improved  · Home regimen 75/25 tid with meals continued, continue sliding scale  · Continue carb control diet  · PCP manages insulin, Recommend patient to follow up with endocrinology outpatient    Essential hypertension  Assessment & Plan  · Blood pressure acceptable  Chronic respiratory failure with hypoxia (HCC)  Assessment & Plan  · Wears 2 L PRN at baseline        CAD (coronary artery disease)  Assessment & Plan  · Had chest pain on admission with negative troponins and nonischemic EKG  No further complaints  · Continue beta-blocker, aspirin, statin  · Of note had stress test in May which was negative for ischemia  VTE Pharmacologic Prophylaxis: VTE Score: 3 Moderate Risk (Score 3-4) - Pharmacological DVT Prophylaxis Ordered: apixaban (Eliquis)  Patient Centered Rounds: I performed bedside rounds with nursing staff today  Discussions with Specialists or Other Care Team Provider: nursing, case management     Education and Discussions with Family / Patient: Patient declined call to   Time Spent for Care: 30 minutes  More than 50% of total time spent on counseling and coordination of care as described above  Current Length of Stay: 2 day(s)    Current Patient Status: Inpatient     Certification Statement: The patient will continue to require additional inpatient hospital stay due to MARANDA requiring nephrology consult  Discharge Plan: Anticipate discharge in 48-72 hrs to home      Code Status: Level 1 - Full Code    Subjective:   Patient denies shortness of breath or wheezing  No further complaints of chest pain  Has a good appetite but has been having intermittent nausea without vomiting  No abdominal pain  States that he has been drinking lots of oral fluid  Urinating without difficulty  Objective:     Vitals:   Temp (24hrs), Av 1 °F (36 7 °C), Min:97 3 °F (36 3 °C), Max:98 7 °F (37 1 °C)    Temp:  [97 3 °F (36 3 °C)-98 7 °F (37 1 °C)] 98 7 °F (37 1 °C)  HR:  [65-81] 73  Resp:  [16-20] 20  BP: (113-139)/(45-74) 118/74  SpO2:  [76 %-99 %] 92 %  Body mass index is 28 45 kg/m²  Input and Output Summary (last 24 hours): Intake/Output Summary (Last 24 hours) at 10/12/2022 1107  Last data filed at 10/12/2022 2673  Gross per 24 hour   Intake 920 ml   Output 2700 ml   Net -1780 ml       Physical Exam:   Physical Exam  Vitals and nursing note reviewed  Constitutional:       General: He is not in acute distress  Appearance: He is obese  Cardiovascular:      Rate and Rhythm: Normal rate  Rhythm irregular  Pulmonary:      Breath sounds: Decreased breath sounds present  No wheezing or rales  Abdominal:      Tenderness: There is no abdominal tenderness  Musculoskeletal:         General: Swelling (trace edema b/l LE) present  Skin:     General: Skin is warm and dry  Neurological:      Mental Status: He is alert and oriented to person, place, and time  Mental status is at baseline  Psychiatric:         Mood and Affect: Affect is flat            Additional Data:     Labs:  Results from last 7 days   Lab Units 10/11/22  0507 10/10/22  0929   WBC Thousand/uL 6 92 13 31*   HEMOGLOBIN g/dL 10 2* 12 2   HEMATOCRIT % 30 8* 35 4*   PLATELETS Thousands/uL 103* 112*   NEUTROS PCT %  --  85*   LYMPHS PCT %  --  6*   MONOS PCT %  --  7   EOS PCT %  --  1     Results from last 7 days   Lab Units 10/12/22  0910 10/11/22  0507 10/10/22  0929   SODIUM mmol/L 135   < > 135   POTASSIUM mmol/L 4 3   < > 4 4 CHLORIDE mmol/L 104   < > 105   CO2 mmol/L 25   < > 22   BUN mg/dL 70*   < > 41*   CREATININE mg/dL 2 84*   < > 2 27*   ANION GAP mmol/L 6   < > 8   CALCIUM mg/dL 8 5   < > 8 5   ALBUMIN g/dL  --   --  2 8*   TOTAL BILIRUBIN mg/dL  --   --  0 29   ALK PHOS U/L  --   --  87   ALT U/L  --   --  15   AST U/L  --   --  8   GLUCOSE RANDOM mg/dL 108   < > 359*    < > = values in this interval not displayed  Results from last 7 days   Lab Units 10/12/22  0629 10/11/22  2316 10/11/22  1557 10/11/22  1125 10/11/22  0628 10/11/22  0323 10/11/22  0105 10/10/22  2252 10/10/22  2033 10/10/22  1830 10/10/22  1655 10/10/22  1153   POC GLUCOSE mg/dl 119 160* 209* 262* 193* 126 100 283* 480* >500* >500* 484*     Results from last 7 days   Lab Units 10/10/22  0929   HEMOGLOBIN A1C % 9 7*           Lines/Drains:  Invasive Devices  Report    Peripheral Intravenous Line  Duration           Peripheral IV 10/10/22 Right Forearm 2 days                      Imaging: No pertinent imaging reviewed      Recent Cultures (last 7 days):         Last 24 Hours Medication List:   Current Facility-Administered Medications   Medication Dose Route Frequency Provider Last Rate   • acetaminophen  650 mg Oral Q6H PRN La Pepper, DO     • apixaban  5 mg Oral BID YOLANDE Fiore     • aspirin  81 mg Oral Daily La Pepper, DO     • carvedilol  6 25 mg Oral BID With Meals La Pepper, DO     • cyclobenzaprine  10 mg Oral BID PRN La Pepper, DO     • ferrous sulfate  325 mg Oral Daily With Breakfast La Pepper, DO     • fluticasone-vilanterol  1 puff Inhalation Daily La Pepper, DO     • gabapentin  100 mg Oral TID La Pepper, DO     • insulin aspart protamine-insulin aspart  45 Units Subcutaneous TID With Meals Dilma Dawkins PA-C     • insulin lispro  2-12 Units Subcutaneous 4x Daily (AC & HS) Dilma Dawkins PA-C     • ipratropium  0 5 mg Nebulization TID Yessicaa Aracely, DO     • levalbuterol  1 25 mg Nebulization TID Yessicaa Aracely, DO     • melatonin  3 mg Oral HS PRN Mal Cuellar PA-C     • pantoprazole  40 mg Oral Early Morning YOLANDE Fiore     • pravastatin  40 mg Oral After Vega Crenshaw, DO     • predniSONE  40 mg Oral Daily YOLANDE Fiore     • tamsulosin  0 4 mg Oral After Vega Crenshaw, DO     • umeclidinium bromide  1 puff Inhalation Daily Viktoria Jessica DO          Today, Patient Was Seen By: Ryan Kim    **Please Note: This note may have been constructed using a voice recognition system  **

## 2022-10-12 NOTE — ASSESSMENT & PLAN NOTE
Wt Readings from Last 3 Encounters:   10/12/22 95 2 kg (209 lb 12 8 oz)   09/17/22 101 kg (222 lb)   09/02/22 107 kg (235 lb 8 3 oz)   · Appears euvolemic to dry on exam   · Continue beta-blocker, holding diuretic secondary to above    · I&Os, daily weights, low-sodium diet  · Monitor volume status

## 2022-10-12 NOTE — ASSESSMENT & PLAN NOTE
Admitted with shortness of breath and wheezing as well as increasing oxygen requirements  Met SIRS criteria on admission, infectious etiologies ruled out  · COVID/influenza/RSV negative  · Chest x-ray without any acute findings  · Improved with intravenous steroids, treated as COPD exacerbation, switched to oral prednisone 10/11 to complete 5 day course     · Rest of plan as below

## 2022-10-12 NOTE — PLAN OF CARE
Problem: PHYSICAL THERAPY ADULT  Goal: Performs mobility at highest level of function for planned discharge setting  See evaluation for individualized goals  Description: Treatment/Interventions: Functional transfer training, LE strengthening/ROM, Elevations, Therapeutic exercise, Endurance training, Equipment eval/education, Gait training  Equipment Recommended:  (tbd- pt would like rollator for home)       See flowsheet documentation for full assessment, interventions and recommendations  Note: Prognosis: Good  Problem List: Decreased endurance, Impaired balance, Decreased mobility  Assessment: Pt is a 78 y o  male seen for PT evaluation s/p admit to [facility] on 10/10/2022  Pt was admitted with a primary dx of: shortness of breath  PT now consulted for assessment of mobility and d/c needs  Pt with Up and OOB as tolerated , PT evaluation orders  Pts current comorbidities effecting treatment include: cardiac disease, CHF, DDD, DM, HTN, prostate cancer, MI  Pts current clinical presentation is Unstable/ Unpredictable (high complexity) due to Ongoing medical management for primary dx, Increased reliance on more restrictive AD compared to baseline, Decreased activity tolerance compared to baseline, Fall risk, Trending lab values  Prior to admission, pt was living with his son in a [place] with 0 VINI, has [address]  Pt is normally I with ADLs, ambulates without AD inside his house and uses SPC in the community, wears 2L O2 at baseline Upon evaluation, pt currently is requiring supervision for transfers and supervision (occasional CGA for steadying) for ambulation 60 ft w/ no AD- pt declining AD but then reaching for handrails to steady himself, would benefit from [facility]   Pt presents at PT eval functioning below baseline and currently w/ overall mobility deficits 2* to: BLE weakness, impaired balance, decreased endurance, gait deviations, decreased activity tolerance compared to baseline, decreased functional mobility tolerance compared to baseline, fall risk, SOB upon exertion  Pt currently at a fall risk 2* to impairments listed above  Pt will continue to benefit from skilled acute PT interventions to address stated impairments; to maximize functional mobility; for ongoing pt/ family training; and DME needs  At conclusion of PT session pt returned back in chair with phone and call bell within reach  Pt denies any further questions at this time  The patient's AM-PAC Basic Mobility Inpatient Short Form Raw Score is 20  A Raw score of greater than 16 suggests the patient may benefit from discharge to home  Please also refer to the recommendation of the Physical Therapist for safe discharge planning  Recommend home with HHPT upon hospital D/C  PT Discharge Recommendation: Home with home health rehabilitation    See flowsheet documentation for full assessment

## 2022-10-12 NOTE — ASSESSMENT & PLAN NOTE
Lab Results   Component Value Date    EGFR 20 10/12/2022    EGFR 24 10/11/2022    EGFR 27 10/10/2022    CREATININE 2 84 (H) 10/12/2022    CREATININE 2 51 (H) 10/11/2022    CREATININE 2 27 (H) 10/10/2022   · MARANDA on CKD, prior baseline appears to be 1 5-1 7, 2 27 on admission, worse today at 2 84 despite holding diuretics and giving gentle IV hydration  · Holding Lasix and losartan  · PVR negative  · Consult nephrology   · Avoid nephrotoxins and hypotension  · BMP in a m

## 2022-10-12 NOTE — ASSESSMENT & PLAN NOTE
Lab Results   Component Value Date    HGBA1C 9 7 (H) 10/10/2022       Recent Labs     10/11/22  1125 10/11/22  1557 10/11/22  2316 10/12/22  0629   POCGLU 262* 209* 160* 119     Hx of poor control  · Hyperglycemic on admission requiring insulin gtt overnight, improved  · Home regimen 75/25 tid with meals continued, continue sliding scale    · Continue carb control diet  · PCP manages insulin, Recommend patient to follow up with endocrinology outpatient

## 2022-10-12 NOTE — PHYSICAL THERAPY NOTE
Physical Therapy Evaluation    Patient's Name: Ke Ragsdale      Admitting Diagnosis  Chest pain [R07 9]  Acute exacerbation of CHF (congestive heart failure) ([facility]) [I50 9]  COPD with acute exacerbation ([facility]) [J44 1]    Problem List  Patient Active Problem List   Diagnosis    COPD exacerbation ([facility] )    HLD (hyperlipidemia)    Type 2 diabetes mellitus with hyperglycemia, with long-term current use of insulin ([facility])    Venous stasis dermatitis of both lower extremities    COPD, severe ([facility])    Pleural effusion on right    Chronic diastolic [facility] failure ([facility] )    Essential hypertension    Diabetic neuropathy (HCC)    Acute on chronic diastolic (congestive) heart failure    CAD (coronary artery disease)    Acute metabolic encephalopathy    RBBB    Oxygen dependent    COPD with acute exacerbation ([facility])    Pulmonary hypertension ([facility])    JACQUELINE (obstructive sleep apnea)    Lung nodule    DDD (degenerative disc disease), lumbar    Anemia, iron deficiency    PAD (peripheral artery disease) ([facility])    Chronic respiratory failure with hypoxia ([facility])    Fracture of navicular bone of left foot    Adenocarcinoma of lung (HCC)    History of prostate cancer    CKD (chronic kidney disease) stage 3, GFR 30-59 ml/min ([facility])    SOB (shortness of breath)    Chronic left-sided low back pain without sciatica    Acute respiratory failure with hypoxia ([facility])    Epididymitis, bilateral    Hypothermia    Medication management    Chest pain, musculoskeletal    Frequent hospital admissions    [facility])       Past Medical History  Past Medical History:   Diagnosis Date    Abdominal pain     MARANDA (acute kidney injury) ([facility] ) 6/19/2018    Cardiac disease     CHF (congestive heart failure) ([facility])     COPD, severe ([facility] )     Coronary artery disease     DDD (degenerative disc disease), lumbar 9/1/2020    Diabetes mellitus ([facility] )     Dyspnea     GERD (gastroesophageal reflux disease)     Hyperlipidemia     Hypertension     MI (myocardial infarction) ([facility] ) with 3 stents    Nodule of apex of right lung     JACQUELINE (obstructive sleep apnea)     Prostate cancer [facility])        Past Surgical History  Past Surgical History:   Procedure Laterality Date    ABDOMINAL SURGERY      exploratory    ANGIOPLASTY      3 stents    APPENDECTOMY      COLONOSCOPY  2015    CT NEEDLE BIOPSY LUNG  11/3/2020    ESOPHAGOGASTRODUODENOSCOPY N/A 10/2/2017    Procedure: ESOPHAGOGASTRODUODENOSCOPY (EGD); Surgeon: Paz Phillips MD;  Location: [facility]; Service: Gastroenterology    IR THORACENTESIS  11/3/2020    KNEE CARTILAGE SURGERY      OTHER SURGICAL HISTORY      stent placement    PROSTATE SURGERY      SKIN GRAFT      Basal cell CA back        10/12/22 0904   PT Last Visit   PT Visit Date 10/12/22   Note Type   Note type Evaluation   Pain Assessment   Pain Assessment Tool 0-10   Pain Score No Pain   Restrictions/Precautions   Other Precautions O2;Fall Risk  (2L O2)   Home Living   Type of Home House   Home Layout Two level; Able to live on main level with bedroom/bathroom  (0 VINI)   Home Equipment Walker;Cane   Additional Comments Pt lives in a [facility] with 0 VINI, has FFSU  Ambulates without any AD inside his house, but uses SPC in the community   Prior Function   Level of [place] Independent with ADLs; Independent with functional mobility   Lives With Son   [place] in the last 6 months 0   Comments Pt is on 2L O2 at baseline   Cognition   Overall Cognitive Status WFL   Orientation Level Oriented X4   Memory Within functional limits   Following Commands Follows one step commands without difficulty   RLE Assessment   RLE Assessment WFL   LLE Assessment   LLE Assessment WFL   Bed Mobility   Additional Comments pt seated OOB upon arrival   Transfers   Sit to Stand 5  Supervision   Additional items Armrests; Impulsive   Stand to Sit 5  Supervision   Additional items Armrests; Impulsive   Additional Comments transfers without AD   Ambulation/Elevation   Gait pattern Inconsistent george;Decreased foot clearance   Gait Assistance 5  Supervision   Additional items Assist x 1   Assistive Device None   Distance 60ft - pt declining AD but then reaching for HR requiring occasional CGA for steadying   Ambulation/Elevation Additional Comments Educated pt on use of SPC next session or when d/c home, pt verbalizing understanding   Balance   Static Sitting Good   Dynamic Sitting Fair +   Static Standing Fair -   Dynamic Standing Fair -   Ambulatory Poor +   Endurance Deficit   Endurance Deficit Yes   Endurance Deficit Description WINTERS   Activity Tolerance   Activity Tolerance Patient limited by fatigue   Medical Staff Made Aware Seen with OT 2* pt's medical complexity and multiple comorbidities  PT focus on functional mobility, transfers, gait, and endurance   Nurse Made Aware ok to see per RN   Assessment   Prognosis Good   Problem List Decreased endurance; Impaired balance;Decreased mobility   Assessment Pt is a 78 y o  male seen for PT evaluation s/p admit to [facility] on 10/10/2022  Pt was admitted with a primary dx of: shortness of breath  PT now consulted for assessment of mobility and d/c needs  Pt with Up and OOB as tolerated , PT evaluation orders  Pts current comorbidities effecting treatment include: cardiac disease, CHF, DDD, DM, HTN, prostate cancer, MI  Pts current clinical presentation is Unstable/ Unpredictable (high complexity) due to Ongoing medical management for primary dx, Increased reliance on more restrictive AD compared to baseline, Decreased activity tolerance compared to baseline, Fall risk, Trending lab values  Prior to admission, pt was living with his son in a [facility] with 0 VINI, has [address]   Pt is normally I with ADLs, ambulates without AD inside his house and uses SPC in the community, wears 2L O2 at baseline Upon evaluation, pt currently is requiring supervision for transfers and supervision (occasional CGA for steadying) for ambulation 60 ft w/ no AD- pt declining AD but then reaching for handrails to steady himself, would benefit from [facility]  Pt presents at PT eval functioning below baseline and currently w/ overall mobility deficits 2* to: BLE weakness, impaired balance, decreased endurance, gait deviations, decreased activity tolerance compared to baseline, decreased functional mobility tolerance compared to baseline, fall risk, SOB upon exertion  Pt currently at a fall risk 2* to impairments listed above  Pt will continue to benefit from skilled acute PT interventions to address stated impairments; to maximize functional mobility; for ongoing pt/ family training; and DME needs  At conclusion of PT session pt returned back in chair with phone and call bell within reach  Pt denies any further questions at this time  The patient's AM-PAC Basic Mobility Inpatient Short Form Raw Score is 20  A Raw score of greater than 16 suggests the patient may benefit from discharge to home  Please also refer to the recommendation of the Physical Therapist for safe discharge planning  Recommend home with HHPT upon hospital D/C  Goals   Patient Goals to get a rollator   STG Expiration Date 10/22/22   Short Term Goal #1 In 10-14 days pt will be able to: 1  Demonstrate ability to perform all aspects of bed mobility independently to increase functional independence  2  Perform functional transfers at mod (I) level to facilitate safe return to previous living environment  3   Ambulate at least 200 ft with LRAD at mod (I) level with stable vitals to improve safety with household distances and reduce fall risk  4  Improve LE strength grades by 1 to increase ease of functional mobility with transfers and gait  5  Pt will demonstrate improved balance by one grade order to decrease risk of falls  PT Treatment Day 0   Plan   Treatment/Interventions Functional transfer training;LE strengthening/ROM; Elevations; Therapeutic exercise; Endurance training;Equipment eval/education;Gait training   PT Frequency 2-3x/wk   Recommendation   PT Discharge Recommendation Home with home health rehabilitation   Equipment Recommended   (tbd- pt would like rollator for home)   AM-PAC Basic Mobility Inpatient   Turning in Bed Without Bedrails 4   Lying on Back to Sitting on Edge of Flat Bed 4   Moving Bed to Chair 3   Standing Up From Chair 3   Walk in Room 3   Climb 3-5 Stairs 3   Basic Mobility Inpatient Raw Score 20   Basic Mobility Standardized Score 43 99   Highest Level Of Mobility   JH-HLM Goal 6: Walk 10 steps or more   JH-HLM Achieved 7: Walk 25 feet or more   Modified [place] Scale   Modified [place] Scale 3       Izzy Proper, PT, DPT

## 2022-10-12 NOTE — ASSESSMENT & PLAN NOTE
Noted in prior EKG's as well but w/o formal evaluation per cardiology, Presenting EKG reading confirmed afib per cardiology  · Continue Coreg  · [id number] of 6, will require anticoagulation  Started on Eliquis, $10 per month, script sent to outpatient pharmacy     · Will need outpatient follow-up with Cardiology

## 2022-10-12 NOTE — ASSESSMENT & PLAN NOTE
· Had chest pain on admission with negative troponins and nonischemic EKG  No further complaints  · Continue beta-blocker, aspirin, statin  · Of note had stress test in May which was negative for ischemia

## 2022-10-12 NOTE — OCCUPATIONAL THERAPY NOTE
Occupational Therapy Evaluation     Patient Name: Drew Nelson  Today's Date: 10/12/2022  Problem List  Principal Problem:    SOB (shortness of breath)  Active Problems:    COPD exacerbation (HCC)    Type 2 diabetes mellitus with hyperglycemia, with long-term current use of insulin (HCC)    Chronic diastolic heart failure (HCC)    Essential hypertension    CAD (coronary artery disease)    Chronic respiratory failure with hypoxia (HCC)    CKD (chronic kidney disease) stage 3, GFR 30-59 ml/min (HCC)    A-fib (HCC)    Past Medical History  Past Medical History:   Diagnosis Date    Abdominal pain     MARANDA (acute kidney injury) ([facility] ) 6/19/2018    Cardiac disease     CHF (congestive heart failure) (formerly [facility])     COPD, severe (HCC)     Coronary artery disease     DDD (degenerative disc disease), lumbar 9/1/2020    Diabetes mellitus (formerly [facility])     Dyspnea     GERD (gastroesophageal reflux disease)     Hyperlipidemia     Hypertension     MI (myocardial infarction) ([facility] )     with 3 stents    Nodule of apex of right lung     JACQUELINE (obstructive sleep apnea)     Prostate cancer [facility])      Past Surgical History  Past Surgical History:   Procedure Laterality Date    ABDOMINAL SURGERY      exploratory    ANGIOPLASTY      3 stents    APPENDECTOMY      COLONOSCOPY  2015    CT NEEDLE BIOPSY LUNG  11/3/2020    ESOPHAGOGASTRODUODENOSCOPY N/A 10/2/2017    Procedure: ESOPHAGOGASTRODUODENOSCOPY (EGD); Surgeon: Valarie Hutchins MD;  Location: [facility];   Service: Gastroenterology    IR THORACENTESIS  11/3/2020    KNEE CARTILAGE SURGERY      OTHER SURGICAL HISTORY      stent placement    PROSTATE SURGERY      SKIN GRAFT      Basal cell CA back           10/12/22 0906   OT Last Visit   OT Visit Date 10/12/22   Note Type   Note type Evaluation   Pain Assessment   Pain Assessment Tool 0-10   Pain Score No Pain   Restrictions/Precautions   Weight Bearing Precautions Per Order No   Other Precautions O2;Fall Risk  (2 L NC)   Home Living   Type of Home House   Home Layout Two level;Performs ADLs on one level; Able to live on main level with bedroom/bathroom  (FFSU, 1 VINI)   Bathroom Shower/Tub Walk-in shower   Bathroom Toilet Raised   Bathroom Equipment Shower chair  (reports use PTA)   Home Equipment Walker;Cane   Additional Comments uses 2 L NC at baseline   Prior Function   Level of [place] Independent with ADLs; Independent with functional mobility   Lives With Son   Receives Help From Family   IADLs   (shares with son)   Falls in the last 6 months 0   Lifestyle   Autonomy At baseline pt was completing all ADLs IND, shares IADLs with his son, ambulating IND w/o AD in household, use of SPC in community, (+) driving  Reciprocal Relationships support from son   Service to Others retired   Intrinsic Gratification pt enjoys riding [employer] [address]  [address] [place] [address] [address] 5  [address]  4  [address] [facility] [address]  4  Minimal Assistance   Transfers   Sit to [address] to Sit 5  Supervision   Additional Comments no AD   Functional Mobility   Functional Mobility 4  Minimal assistance   Additional Comments held onto hallway railing  Would benefit from AD trial  Short distance in hallway with seated rest break   Balance   Static Sitting Good   Dynamic Sitting Fair +   Static Standing Fair   Dynamic Standing Fair -   Activity Tolerance   Activity Tolerance Patient limited by fatigue   Medical Staff Made Aware PT   Nurse Made Aware Per RN pt appropriate to be seen   RUE Assessment   RUE Assessment WFL   LUE Assessment   LUE Assessment WFL   Hand Function   Gross Motor Coordination Functional   Fine Motor Coordination Functional   Cognition   Overall Cognitive Status WFL   Arousal/Participation Alert; Cooperative   Attention Within functional limits   Orientation Level Oriented X4   Memory Within functional limits   Following Commands Follows one step commands without difficulty   Assessment   Limitation Decreased ADL status; Decreased endurance;Decreased self-care trans;Decreased high-level ADLs   Prognosis Good   Assessment Pt is a 78 y o  male who was admitted to [facility] on 10/10/2022 with SOB (shortness of breath) and increased oxygen requirements likely 2' COPD exacerbation  Pt  has a past medical history of Abdominal pain, MARANDA (acute kidney injury) ([facility] ) (6/19/2018), Cardiac disease, CHF (congestive heart failure) ([facility] ), COPD, severe ([facility] ), Coronary artery disease, DDD (degenerative disc disease), lumbar (9/1/2020), Diabetes mellitus ([facility] ), Dyspnea, GERD (gastroesophageal reflux disease), Hyperlipidemia, Hypertension, MI (myocardial infarction) ([facility] ), Nodule of apex of right lung, JACQUELINE (obstructive sleep apnea), and Prostate cancer ([facility] )  At baseline pt was completing all ADLs IND, shares IADLs with his son, ambulating IND w/o AD in household, use of SPC in community, (+) driving  Pt uses 2 L NC of supplemental O2 at baseline  Pt lives with his son in a house with [address]  Currently pt requires SUP-MIN A for overall ADLS and for functional mobility/transfers  Pt currently presents with impairments in the following categories -difficulty performing ADLS and difficulty performing IADLS  activity tolerance, endurance, standing balance/tolerance and sitting balance/tolerance  These impairments, as well as pt's fatigue, SOB, decreased caregiver support and risk for falls  limit pt's ability to safely engage in all baseline areas of occupation, includinggrooming, bathing, dressing, toileting, functional mobility/transfers, community mobility, meal prep, cleaning and leisure activities   From OT standpoint, recommend HOME WITH FAMILY SUPPORT upon D/C  OT will continue to follow to address the below stated goals     Goals   Patient Goals to get a rollator   LTG Time Frame 10-14   Long Term Goal #1 see goals below   Plan   Treatment Interventions ADL retraining;Functional transfer training; Endurance training;Patient/family training;Equipment evaluation/education; Compensatory technique education; Energy conservation; Activityengagement   Goal Expiration Date 10/26/22   OT Frequency 3-5x/wk   Recommendation   OT Discharge Recommendation No rehabilitation needs   Equipment Recommended   (recommend continued use of SC)   AM-PAC Daily Activity Inpatient   Lower Body Dressing 3   Bathing 3   Toileting 3   Upper Body Dressing 3   Grooming 3   Eating 4   Daily Activity Raw Score 19   Daily Activity Standardized Score (Calc for Raw Score >=11) 40 22   AM-PAC Applied Cognition Inpatient   Following a Speech/Presentation 4   Understanding Ordinary Conversation 4   Taking Medications 4   Remembering Where Things Are Placed or Put Away 4   Remembering List of 4-5 Errands 4   Taking Care of Complicated Tasks 4   Applied Cognition Raw Score 24   Applied Cognition Standardized Score 62 21   Modified [place] Scale   Modified [place] Scale 4       The patient's raw score on the AM-PAC Daily Activity inpatient short form is 19, standardized score is 40 22, greater than 39 4  Patients at this level are likely to benefit from discharge to home  Please refer to the recommendation of the Occupational Therapist for safe discharge planning            GOALS     Pt will improve activity tolerance to G for min 30 min txment sessions     Pt will complete LB ADLs with MOD IND w/ use of AE and DME as needed     Pt will complete toileting w/ MOD IND w/ G hygiene/thoroughness using DME as needed     Pt will improve functional transfers to Mod I on/off all surfaces using DME as needed w/ G balance/safety      Pt will improve functional mobility during ADL/IADL/leisure tasks to Mod I using DME as needed w/ G balance/safety      Pt will demonstrate G carryover of pt/caregiver education and training as appropriate w/ mod I w/o cues w/ good tolerance    Pt to demo % carryover of energy conservation strategies during functional tasks            Cathie Oliveira

## 2022-10-12 NOTE — PLAN OF CARE
Problem: OCCUPATIONAL THERAPY ADULT  Goal: Performs self-care activities at highest level of function for planned discharge setting  See evaluation for individualized goals  Description: Treatment Interventions: ADL retraining, Functional transfer training, Endurance training, Patient/family training, Equipment evaluation/education, Compensatory technique education, Energy conservation, Activityengagement  Equipment Recommended:  (recommend continued use of SC)       See flowsheet documentation for full assessment, interventions and recommendations  Note: Limitation: Decreased ADL status, Decreased endurance, Decreased self-care trans, Decreased high-level ADLs  Prognosis: Good  Assessment: Pt is a 78 y o  male who was [facility] to [facility] on 10/10/2022 with SOB (shortness of breath) and increased oxygen requirements likely 2' COPD exacerbation  Pt  has a past medical history of Abdominal pain, MARANDA (acute kidney injury) ([facility] ) (6/19/2018), Cardiac disease, CHF (congestive heart failure) ([facility] ), COPD, severe ([facility] ), Coronary artery disease, DDD (degenerative disc disease), lumbar (9/1/2020), Diabetes mellitus ([facility] ), Dyspnea, GERD (gastroesophageal reflux disease), Hyperlipidemia, Hypertension, MI (myocardial infarction) ([facility] ), Nodule of apex of right lung, JACQUELINE (obstructive sleep apnea), and Prostate cancer ([facility] )  At baseline pt was completing all ADLs IND, shares IADLs with his son, ambulating IND w/o AD in household, use of SPC in community, (+) driving  Pt uses 2 L NC of supplemental O2 at baseline  Pt lives with his son in a house with [address]  Currently pt requires SUP-MIN A for overall ADLS and for functional mobility/transfers  Pt currently presents with impairments in the following categories -difficulty performing ADLS and difficulty performing IADLS  activity tolerance, endurance, standing balance/tolerance and sitting balance/tolerance   These impairments, as well as pt's fatigue, SOB, decreased caregiver support and risk for falls  limit pt's ability to safely engage in all baseline areas of occupation, includinggrooming, bathing, dressing, toileting, functional mobility/transfers, community mobility, meal prep, cleaning and leisure activities   From OT standpoint, recommend HOME WITH FAMILY SUPPORT upon D/C  OT will continue to follow to address the below stated goals       OT Discharge Recommendation: No rehabilitation needs

## 2022-10-13 LAB
ANION GAP SERPL CALCULATED.3IONS-SCNC: 5 MMOL/L (ref 4–13)
BUN SERPL-MCNC: 74 MG/DL (ref 5–25)
CALCIUM SERPL-MCNC: 8.3 MG/DL (ref 8.3–10.1)
CHLORIDE SERPL-SCNC: 107 MMOL/L (ref 96–108)
CO2 SERPL-SCNC: 25 MMOL/L (ref 21–32)
CREAT SERPL-MCNC: 2.81 MG/DL (ref 0.6–1.3)
GFR SERPL CREATININE-BSD FRML MDRD: 21 ML/MIN/1.73SQ M
GLUCOSE SERPL-MCNC: 167 MG/DL (ref 65–140)
GLUCOSE SERPL-MCNC: 192 MG/DL (ref 65–140)
GLUCOSE SERPL-MCNC: 247 MG/DL (ref 65–140)
GLUCOSE SERPL-MCNC: 67 MG/DL (ref 65–140)
GLUCOSE SERPL-MCNC: 73 MG/DL (ref 65–140)
GLUCOSE SERPL-MCNC: 82 MG/DL (ref 65–140)
GLUCOSE SERPL-MCNC: 86 MG/DL (ref 65–140)
PHOSPHATE SERPL-MCNC: 4.9 MG/DL (ref 2.3–4.1)
POTASSIUM SERPL-SCNC: 4.5 MMOL/L (ref 3.5–5.3)
SODIUM SERPL-SCNC: 137 MMOL/L (ref 135–147)

## 2022-10-13 PROCEDURE — 84100 ASSAY OF PHOSPHORUS: CPT | Performed by: NURSE PRACTITIONER

## 2022-10-13 PROCEDURE — 80048 BASIC METABOLIC PNL TOTAL CA: CPT | Performed by: NURSE PRACTITIONER

## 2022-10-13 PROCEDURE — 94640 AIRWAY INHALATION TREATMENT: CPT

## 2022-10-13 PROCEDURE — 99232 SBSQ HOSP IP/OBS MODERATE 35: CPT | Performed by: INTERNAL MEDICINE

## 2022-10-13 PROCEDURE — 94760 N-INVAS EAR/PLS OXIMETRY 1: CPT

## 2022-10-13 PROCEDURE — 82948 REAGENT STRIP/BLOOD GLUCOSE: CPT

## 2022-10-13 PROCEDURE — 99232 SBSQ HOSP IP/OBS MODERATE 35: CPT | Performed by: PHYSICIAN ASSISTANT

## 2022-10-13 RX ADMIN — IPRATROPIUM BROMIDE 0.5 MG: 0.5 SOLUTION RESPIRATORY (INHALATION) at 07:50

## 2022-10-13 RX ADMIN — LEVALBUTEROL HYDROCHLORIDE 1.25 MG: 1.25 SOLUTION, CONCENTRATE RESPIRATORY (INHALATION) at 13:43

## 2022-10-13 RX ADMIN — GABAPENTIN 100 MG: 100 CAPSULE ORAL at 19:48

## 2022-10-13 RX ADMIN — GABAPENTIN 100 MG: 100 CAPSULE ORAL at 08:42

## 2022-10-13 RX ADMIN — CARVEDILOL 6.25 MG: 6.25 TABLET, FILM COATED ORAL at 19:49

## 2022-10-13 RX ADMIN — PRAVASTATIN SODIUM 40 MG: 40 TABLET ORAL at 19:49

## 2022-10-13 RX ADMIN — FERROUS SULFATE TAB 325 MG (65 MG ELEMENTAL FE) 325 MG: 325 (65 FE) TAB at 08:43

## 2022-10-13 RX ADMIN — APIXABAN 5 MG: 5 TABLET, FILM COATED ORAL at 08:43

## 2022-10-13 RX ADMIN — INSULIN LISPRO 2 UNITS: 100 INJECTION, SOLUTION INTRAVENOUS; SUBCUTANEOUS at 19:50

## 2022-10-13 RX ADMIN — UMECLIDINIUM 1 PUFF: 62.5 AEROSOL, POWDER ORAL at 08:44

## 2022-10-13 RX ADMIN — PANTOPRAZOLE SODIUM 40 MG: 40 TABLET, DELAYED RELEASE ORAL at 06:38

## 2022-10-13 RX ADMIN — IPRATROPIUM BROMIDE 0.5 MG: 0.5 SOLUTION RESPIRATORY (INHALATION) at 13:43

## 2022-10-13 RX ADMIN — ASPIRIN 81 MG: 81 TABLET, COATED ORAL at 08:43

## 2022-10-13 RX ADMIN — INSULIN ASPART 45 UNITS: 100 INJECTION, SUSPENSION SUBCUTANEOUS at 12:45

## 2022-10-13 RX ADMIN — Medication 3 MG: at 19:51

## 2022-10-13 RX ADMIN — INSULIN ASPART 45 UNITS: 100 INJECTION, SUSPENSION SUBCUTANEOUS at 08:42

## 2022-10-13 RX ADMIN — PREDNISONE 40 MG: 20 TABLET ORAL at 08:43

## 2022-10-13 RX ADMIN — INSULIN ASPART 45 UNITS: 100 INJECTION, SUSPENSION SUBCUTANEOUS at 19:49

## 2022-10-13 RX ADMIN — FLUTICASONE FUROATE AND VILANTEROL TRIFENATATE 1 PUFF: 100; 25 POWDER RESPIRATORY (INHALATION) at 08:44

## 2022-10-13 RX ADMIN — APIXABAN 5 MG: 5 TABLET, FILM COATED ORAL at 19:49

## 2022-10-13 RX ADMIN — LEVALBUTEROL HYDROCHLORIDE 1.25 MG: 1.25 SOLUTION, CONCENTRATE RESPIRATORY (INHALATION) at 07:50

## 2022-10-13 RX ADMIN — CARVEDILOL 6.25 MG: 6.25 TABLET, FILM COATED ORAL at 08:43

## 2022-10-13 RX ADMIN — IPRATROPIUM BROMIDE 0.5 MG: 0.5 SOLUTION RESPIRATORY (INHALATION) at 19:36

## 2022-10-13 RX ADMIN — LEVALBUTEROL HYDROCHLORIDE 1.25 MG: 1.25 SOLUTION, CONCENTRATE RESPIRATORY (INHALATION) at 19:36

## 2022-10-13 RX ADMIN — INSULIN LISPRO 4 UNITS: 100 INJECTION, SOLUTION INTRAVENOUS; SUBCUTANEOUS at 12:45

## 2022-10-13 RX ADMIN — ACETAMINOPHEN 650 MG: 325 TABLET, FILM COATED ORAL at 20:25

## 2022-10-13 RX ADMIN — TAMSULOSIN HYDROCHLORIDE 0.4 MG: 0.4 CAPSULE ORAL at 19:49

## 2022-10-13 NOTE — OCCUPATIONAL THERAPY NOTE
Occupational Therapy Cancellation       10/13/22 1125   OT Last Visit   OT Visit Date 10/13/22   Note Type   Note Type Cancelled Session   Cancel Reasons Other       Chart reviewed  Attempted to see pt  Pt refusing therapy session - reporting he is upset about not getting a rollator and wants "nothing to do" with therapy  OT to continue to follow and attempt session as able        Edil Cavazos

## 2022-10-13 NOTE — PROGRESS NOTES
[facility]  Progress Note - Alyssa Piña Sr  1945, 78 y o  male MRN: [id number]  Unit/Bed#: CW2 218-01 Encounter: [id number]  Primary Care Provider: Jean Roa MD   Date and time admitted to hospital: 10/10/2022  9:01 AM    * SOB (shortness of breath)  Assessment & Plan  Admitted with shortness of breath and wheezing as well as increasing oxygen requirements  Met SIRS criteria on admission, infectious etiologies ruled out  · COVID/influenza/RSV negative  · Chest x-ray without any acute findings  · Improved with intravenous steroids, treated as COPD exacerbation, switched to oral prednisone 10/11 to complete 5 day course  · Rest of plan as below    A-fib [facility])  Assessment & Plan  Noted in prior EKG's as well but w/o formal evaluation per cardiology, Presenting EKG reading confirmed afib per cardiology  · Continue Coreg  · [id number] of 6, will require anticoagulation  Started on Eliquis, $10 per month, script sent to outpatient pharmacy  · Will need outpatient follow-up with Cardiology     CKD (chronic kidney disease) stage 3, GFR 30-59 ml/min [facility])  Assessment & Plan  Lab Results   Component Value Date    EGFR 21 10/13/2022    EGFR 20 10/12/2022    EGFR 24 10/11/2022    CREATININE 2 81 (H) 10/13/2022    CREATININE 2 84 (H) 10/12/2022    CREATININE 2 51 (H) 10/11/2022   · MARANDA on CKD, prior baseline appears to be 1 5-1 7, 2 27 on admission, worse today at 2 84 despite holding diuretics and giving gentle IV hydration  · Holding Lasix and losartan  · PVR negative  · Nephrology eval appreciated, given IV fluids with minimal improvement  Today monitoring off fluids/diuretics/ARB  If renal function stable/improved tomorrow, can d/c from renal standpoint   · Avoid nephrotoxins and hypotension  · BMP in a m      Chronic respiratory failure with hypoxia (HCC)  Assessment & Plan  · Wears 2 L PRN at baseline    CAD (coronary artery disease)  Assessment & Plan  · Had chest pain on admission with negative troponins and nonischemic EKG  No further complaints  · Continue beta-blocker, aspirin, statin  · Of note had stress test in May which was negative for ischemia  Essential hypertension  Assessment & Plan  · Blood pressure acceptable  Chronic diastolic heart failure (HCC)  Assessment & Plan  Wt Readings from Last 3 Encounters:   10/13/22 96 kg (211 lb 9 6 oz)   09/17/22 101 kg (222 lb)   09/02/22 107 kg (235 lb 8 3 oz)   · Appears euvolemic to dry on exam   · Continue beta-blocker, holding diuretic secondary to above  · I&Os, daily weights, low-sodium diet  · Monitor volume status    Type 2 diabetes mellitus with hyperglycemia, with long-term current use of insulin [facility])  Assessment & Plan  Lab Results   Component Value Date    HGBA1C 9 7 (H) 10/10/2022       Recent Labs     10/12/22  2042 10/13/22  0638 10/13/22  0717 10/13/22  1147   POCGLU 173* 67 82 247*     Hx of poor control  · Hyperglycemic on admission requiring insulin gtt overnight, improved  · Home regimen 75/25 tid with meals continued, continue sliding scale  · Continue carb control diet  · PCP manages insulin, Recommend patient to follow up with endocrinology outpatient    COPD exacerbation ([facility] )  Assessment & Plan  · Plan as above          VTE Pharmacologic Prophylaxis: VTE Score: 3 Moderate Risk (Score 3-4) - Pharmacological DVT Prophylaxis Ordered: apixaban (Eliquis)  Patient Centered Rounds: I performed bedside rounds with nursing staff today  Discussions with Specialists or Other Care Team Provider: RN     Education and Discussions with Family / Patient: Patient declined call to   Time Spent for Care: 30 minutes  More than 50% of total time spent on counseling and coordination of care as described above      Current Length of Stay: 3 day(s)  Current Patient Status: Inpatient   Certification Statement: The patient will continue to require additional inpatient hospital stay due to MARANDA  Discharge Plan: Anticipate discharge in 24-48 hrs to home  Code Status: Level 1 - Full Code    Subjective:   Patient has no acute complaints, feels fine  Objective:     Vitals:   Temp (24hrs), Av 4 °F (36 9 °C), Min:98 2 °F (36 8 °C), Max:98 5 °F (36 9 °C)    Temp:  [98 2 °F (36 8 °C)-98 5 °F (36 9 °C)] 98 2 °F (36 8 °C)  HR:  [63-67] 65  Resp:  [20] 20  BP: (130-136)/(55-69) 135/62  SpO2:  [91 %-100 %] 97 %  Body mass index is 28 7 kg/m²  Input and Output Summary (last 24 hours): Intake/Output Summary (Last 24 hours) at 10/13/2022 1651  Last data filed at 10/13/2022 0815  Gross per 24 hour   Intake 1240 83 ml   Output 1500 ml   Net -259 17 ml       Physical Exam:   Physical Exam  Vitals reviewed  Constitutional:       General: He is not in acute distress  Appearance: He is not toxic-appearing  HENT:      Head: Normocephalic and atraumatic  Eyes:      Extraocular Movements: Extraocular movements intact  Cardiovascular:      Rate and Rhythm: Normal rate and regular rhythm  Pulmonary:      Effort: Pulmonary effort is normal  No respiratory distress  Breath sounds: Normal breath sounds  Abdominal:      General: Bowel sounds are normal  There is no distension  Palpations: Abdomen is soft  Tenderness: There is no abdominal tenderness  Musculoskeletal:         General: Normal range of motion  Cervical back: Normal range of motion  Neurological:      General: No focal deficit present  Mental Status: He is alert and oriented to person, place, and time  Psychiatric:         Mood and Affect: Mood normal          Behavior: Behavior normal          Thought Content:  Thought content normal           Additional Data:     Labs:  Results from last 7 days   Lab Units 10/11/22  0507 10/10/22  0929   WBC Thousand/uL 6 92 13 31*   HEMOGLOBIN g/dL 10 2* 12 2   HEMATOCRIT % 30 8* 35 4*   PLATELETS Thousands/uL 103* 112*   NEUTROS PCT %  --  85*   LYMPHS PCT %  --  6* MONOS PCT %  --  7   EOS PCT %  --  1     Results from last 7 days   Lab Units 10/13/22  0646 10/11/22  0507 10/10/22  0929   SODIUM mmol/L 137   < > 135   POTASSIUM mmol/L 4 5   < > 4 4   CHLORIDE mmol/L 107   < > 105   CO2 mmol/L 25   < > 22   BUN mg/dL 74*   < > 41*   CREATININE mg/dL 2 81*   < > 2 27*   ANION GAP mmol/L 5   < > 8   CALCIUM mg/dL 8 3   < > 8 5   ALBUMIN g/dL  --   --  2 8*   TOTAL BILIRUBIN mg/dL  --   --  0 29   ALK PHOS U/L  --   --  87   ALT U/L  --   --  15   AST U/L  --   --  8   GLUCOSE RANDOM mg/dL 73   < > 359*    < > = values in this interval not displayed  Results from last 7 days   Lab Units 10/13/22  1147 10/13/22  0717 10/13/22  2931 10/12/22  2042 10/12/22  1607 10/12/22  1152 10/12/22  0629 10/11/22  2316 10/11/22  1557 10/11/22  1125 10/11/22  0628 10/11/22  0323   POC GLUCOSE mg/dl 247* 82 67 173* 149* 93 119 160* 209* 262* 193* 126     Results from last 7 days   Lab Units 10/10/22  0929   HEMOGLOBIN A1C % 9 7*           Lines/Drains:  Invasive Devices  Report    Peripheral Intravenous Line  Duration           Peripheral IV 10/10/22 Right Forearm 3 days                      Imaging: No pertinent imaging reviewed      Recent Cultures (last 7 days):         Last 24 Hours Medication List:   Current Facility-Administered Medications   Medication Dose Route Frequency Provider Last Rate   • acetaminophen  650 mg Oral Q6H PRN Crissy Earthly, DO     • apixaban  5 mg Oral BID YOLANDE Fiore     • aspirin  81 mg Oral Daily Crissy Earthly, DO     • carvedilol  6 25 mg Oral BID With Meals Crissy Earthly, DO     • cyclobenzaprine  10 mg Oral BID PRN Crissy Earthly, DO     • ferrous sulfate  325 mg Oral Daily With Breakfast Crissy Earthly, DO     • fluticasone-vilanterol  1 puff Inhalation Daily Crissy Earthly, DO     • gabapentin  100 mg Oral TID Crissy Earthly, DO     • insulin aspart protamine-insulin aspart  45 Units Subcutaneous TID With Meals Dontrell De Santiago PA-C     • insulin lispro  2-12 Units Subcutaneous 4x Daily (AC & HS) Dontrell De Santiago PA-C     • ipratropium  0 5 mg Nebulization TID Oneida Pen, DO     • levalbuterol  1 25 mg Nebulization TID Michaela Pen, DO     • melatonin  3 mg Oral HS PRN Dontrell De Santiago PA-C     • pantoprazole  40 mg Oral Early Morning YOLANDE Fiore     • pravastatin  40 mg Oral After [place] Caulk, DO     • predniSONE  40 mg Oral Daily YOLANDE Fiore     • tamsulosin  0 4 mg Oral After Carol Caulk, DO     • umeclidinium bromide  1 puff Inhalation Daily Oneida Pen, DO          Today, Patient Was Seen By: José Miguel Barrientos    **Please Note: This note may have been constructed using a voice recognition system  **

## 2022-10-13 NOTE — PHYSICAL THERAPY NOTE
Physical Therapy Cancellation Note    Patient's Name: Drew Nelson        10/13/22 1342   PT Last Visit   PT Visit Date 10/13/22   Note Type   Note Type Cancelled Session   Cancel Reasons Other       Orders received, chart reviewed  Attempted to see pt for PT however pt currently undergoing breathing treatment with respiratory therapy  Will hold PT at this time and follow for treatment as medically appropriate  Thank you       Sal Khan, PT, DPT

## 2022-10-13 NOTE — ASSESSMENT & PLAN NOTE
Lab Results   Component Value Date    HGBA1C 9 7 (H) 10/10/2022       Recent Labs     10/12/22  2042 10/13/22  0638 10/13/22  0717 10/13/22  1147   POCGLU 173* 67 82 247*     Hx of poor control  · Hyperglycemic on admission requiring insulin gtt overnight, improved  · Home regimen 75/25 tid with meals continued, continue sliding scale    · Continue carb control diet  · PCP manages insulin, Recommend patient to follow up with endocrinology outpatient

## 2022-10-13 NOTE — ASSESSMENT & PLAN NOTE
Wt Readings from Last 3 Encounters:   10/13/22 96 kg (211 lb 9 6 oz)   09/17/22 101 kg (222 lb)   09/02/22 107 kg (235 lb 8 3 oz)   · Appears euvolemic to dry on exam   · Continue beta-blocker, holding diuretic secondary to above    · I&Os, daily weights, low-sodium diet  · Monitor volume status

## 2022-10-13 NOTE — PROGRESS NOTES
NEPHROLOGY PROGRESS NOTE   Ke Ragsdale  78 y o  male MRN: [id number]  Unit/Bed#: 2 218-01 Encounter: [id number]      ASSESSMENT & PLAN:  1  Acute kidney injury on top of CKD stage 3 with previous creatinine around 1 4-1 7 though lately seems to be in the low 2s  Admitted with a creatinine of 2 27, creatinine increased to 2 84 on 10/12  Patient on exam does not looks grossly volume overload  He received 500 cc of normal saline yesterday  Creatinine today essentially unchanged  Recommend to keep holding ARB and diuretics for another 24 hours  Urinalysis on 10/12 bland, no rbc's, no wbc's, no protein  Avoid relative hypotension  If kidney function tomorrow continues to improve, stable from kidney standpoint of view to be discharged  He will need outpatient follow-up, last time he was seen in our office was in 2018    2  Chronic kidney disease stage IIIB with previous creatinine in the high 1s though lately in the low 2s    3  Shortness of breath, suspected COPD exacerbation, continue medical management per primary team     4  Chronic diastolic congestive heart failure, atrial fibrillation  Hold diuretics for another 24 hours due to MARANDA  Follow low-salt diet    5  Mild anemia, last hemoglobin 10 2 on 10/11    6  Hypertension, blood pressure stable, keep holding ARB in the setting of acute kidney injury    My plan and recommendations were discussed with Internal Medicine team via [facility] text      SUBJECTIVE:  Patient seen and examined, denies significant chest pain, shortness of breath is slightly improved compared to admission  Denies any abdominal pain, no nausea, no vomiting, no urinary problems        OBJECTIVE:  Current Weight: Weight - Scale: 96 kg (211 lb 9 6 oz)  Vitals:    10/13/22 0751   BP:    Pulse:    Resp:    Temp:    SpO2: 100%       Intake/Output Summary (Last 24 hours) at 10/13/2022 0901  Last data filed at 10/13/2022 0630  Gross per 24 hour   Intake 1240 83 ml   Output 2415 ml   Net -1174 17 ml       General: conscious, cooperative, in not acute distress  Eyes: conjunctivae pink, anicteric sclerae  ENT: lips and mucous membranes moist, on room air  Neck: supple, no JVD  Chest:  Prolonged expiration, expiratory wheezes, some rales  CVS: distinct S1 & S2, normal rate, regular rhythm  Abdomen: soft, non-tender, non-distended, normoactive bowel sounds  Extremities:  Minimal edema of both legs  Skin:  Multiple ecchymosis in upper extremities  Neuro: awake, alert, oriented        Medications:    Current Facility-Administered Medications:   •  acetaminophen (TYLENOL) tablet 650 mg, 650 mg, Oral, Q6H PRN, Aundrea Lock, DO  •  apixaban (ELIQUIS) tablet 5 mg, 5 mg, Oral, BID, YOLANDE Fiore, 5 mg at 10/13/22 4059  •  aspirin (ECOTRIN LOW STRENGTH) EC tablet 81 mg, 81 mg, Oral, Daily, Aundrea Lock, DO, 81 mg at 10/13/22 7575  •  carvedilol (COREG) tablet 6 25 mg, 6 25 mg, Oral, BID With Meals, Aundrea Lock, DO, 6 25 mg at 10/13/22 6714  •  cyclobenzaprine (FLEXERIL) tablet 10 mg, 10 mg, Oral, BID PRN, Aundrea Lock, DO  •  ferrous sulfate tablet 325 mg, 325 mg, Oral, Daily With Breakfast, Aundrea Lock, DO, 325 mg at 10/13/22 0843  •  fluticasone-vilanterol (BREO ELLIPTA) 100-25 mcg/inh inhaler 1 puff, 1 puff, Inhalation, Daily, Aundrea Lock, DO, 1 puff at 10/13/22 0844  •  gabapentin (NEURONTIN) capsule 100 mg, 100 mg, Oral, TID, Sarah Hubbard, DO, 100 mg at 10/13/22 9907  •  insulin aspart protamine-insulin aspart (NovoLOG 70/30) 100 units/mL subcutaneous injection 45 Units, 45 Units, Subcutaneous, TID With Meals, Alexa Handy PA-C, 45 Units at 10/13/22 0842  •  insulin lispro (HumaLOG) 100 units/mL subcutaneous injection 2-12 Units, 2-12 Units, Subcutaneous, 4x Daily (AC & HS), 2 Units at 10/12/22 2141 **AND** Fingerstick Glucose (POCT), , , 4x Daily AC and at bedtime, Alexa Handy PA-C  •  ipratropium (ATROVENT) 0 02 % inhalation solution 0 5 mg, 0 5 mg, Nebulization, TID, La Pepper, DO, 0 5 mg at 10/13/22 0750  •  levalbuterol (Harvy Grater) inhalation solution 1 25 mg, 1 25 mg, Nebulization, TID, La Pepper, DO, 1 25 mg at 10/13/22 0750  •  melatonin tablet 3 mg, 3 mg, Oral, HS PRN, Dilma Dawkins PA-C, 3 mg at 10/11/22 2338  •  pantoprazole (PROTONIX) EC tablet 40 mg, 40 mg, Oral, Early Morning, Sangeetha M Malou, CRNP, 40 mg at 10/13/22 0336  •  pravastatin (PRAVACHOL) tablet 40 mg, 40 mg, Oral, After Jim Mcgowan DO, 40 mg at 10/12/22 1739  •  predniSONE tablet 40 mg, 40 mg, Oral, Daily, Sangeetha M Malou, CRNP, 40 mg at 10/13/22 7860  •  tamsulosin (FLOMAX) capsule 0 4 mg, 0 4 mg, Oral, After Jim Mcgowan DO, 0 4 mg at 10/12/22 1739  •  umeclidinium bromide (INCRUSE ELLIPTA) 62 5 mcg/inh inhaler AEPB 1 puff, 1 puff, Inhalation, Daily, Abhinav Velasco, DO, 1 puff at 10/13/22 0844    Invasive Devices:        Lab Results:   Results from last 7 days   Lab Units 10/13/22  0646 10/12/22  0910 10/11/22  0507 10/10/22  0929   WBC Thousand/uL  --   --  6 92 13 31*   HEMOGLOBIN g/dL  --   --  10 2* 12 2   HEMATOCRIT %  --   --  30 8* 35 4*   PLATELETS Thousands/uL  --   --  103* 112*   SODIUM mmol/L 137 135 135 135   POTASSIUM mmol/L 4 5 4 3 4 6 4 4   CHLORIDE mmol/L 107 104 104 105   CO2 mmol/L 25 25 24 22   BUN mg/dL 74* 70* 50* 41*   CREATININE mg/dL 2 81* 2 84* 2 51* 2 27*   CALCIUM mg/dL 8 3 8 5 8 3 8 5   PHOSPHORUS mg/dL 4 9*  --   --   --    ALK PHOS U/L  --   --   --  87   ALT U/L  --   --   --  15   AST U/L  --   --   --  8       Portions of the record may have been created with voice recognition software  Occasional wrong word or "sound a like" substitutions may have occurred due to the inherent limitations of voice recognition software  Read the chart carefully and recognize, using context, where substitutions have occurred  If you have any questions, please contact the dictating provider

## 2022-10-13 NOTE — ASSESSMENT & PLAN NOTE
Noted in prior EKG's as well but w/o formal evaluation per cardiology, Presenting EKG reading confirmed afib per cardiology  · Continue Coreg  · [id number] of 6, will require anticoagulation  Started on Eliquis, $10 per month, script sent to outpatient pharmacy     · Will need outpatient follow-up with Cardiology

## 2022-10-13 NOTE — ASSESSMENT & PLAN NOTE
Lab Results   Component Value Date    EGFR 21 10/13/2022    EGFR 20 10/12/2022    EGFR 24 10/11/2022    CREATININE 2 81 (H) 10/13/2022    CREATININE 2 84 (H) 10/12/2022    CREATININE 2 51 (H) 10/11/2022   · MARANDA on CKD, prior baseline appears to be 1 5-1 7, 2 27 on admission, worse today at 2 84 despite holding diuretics and giving gentle IV hydration  · Holding Lasix and losartan  · PVR negative  · Nephrology eval appreciated, given IV fluids with minimal improvement  Today monitoring off fluids/diuretics/ARB  If renal function stable/improved tomorrow, can d/c from renal standpoint   · Avoid nephrotoxins and hypotension  · BMP in a m

## 2022-10-13 NOTE — CASE MANAGEMENT
Case Management Discharge Planning Note    Patient name Estefania Chávez  Location 2 218/CW2 218-01 MRN [id number]  : 1945 Date 10/13/2022       Current Admission Date: 10/10/2022  Current Admission Diagnosis:SOB (shortness of breath)   Patient Active Problem List    Diagnosis Date Noted   • [facility]) 10/10/2022   • Frequent hospital admissions 2022   • Medication management 2022   • Chest pain, musculoskeletal 2022   • Hypothermia 2022   • Epididymitis, bilateral 06/15/2022   • Acute respiratory failure with hypoxia ([address] ) 2022   • Chronic left-sided low back pain without sciatica 2022   • SOB (shortness of breath) 2022   • CKD (chronic kidney disease) stage 3, GFR 30-59 ml/min ([address] ) 2022   • History of prostate cancer 2022   • Adenocarcinoma of lung ([address] ) 2022   • Fracture of navicular bone of left foot 2021   • Chronic respiratory failure with hypoxia ([address] ) 04/15/2021   • PAD (peripheral artery disease) ([address] ) 2021   • DDD (degenerative disc disease), lumbar 2020   • Anemia, iron deficiency 2020   • Lung nodule 2020   • Pulmonary hypertension ([address] ) 2019   • JACQUELINE (obstructive sleep apnea) 2019   • COPD with acute exacerbation ([address] ) 2019   • Oxygen dependent 2018   • Acute metabolic encephalopathy 7619   • RBBB 2018   • Acute on chronic diastolic (congestive) heart failure 2018   • CAD (coronary artery disease) 2018   • Chronic diastolic heart failure ([address] ) 2018   • Pleural effusion on right 10/31/2017   • COPD, severe ([address] ) 2017   • Diabetic neuropathy ([address] ) 2017   • Essential hypertension 2016   • Venous stasis dermatitis of both lower extremities 11/15/2016   • Type 2 diabetes mellitus with hyperglycemia, with long-term current use of insulin ([facility] ) 2016   • COPD exacerbation ([facility] ) 2016   • HLD (hyperlipidemia) 2016 LOS (days): 3  Geometric Mean LOS (GMLOS) (days): 3 10  Days to GMLOS:0 1     OBJECTIVE:  Risk of Unplanned Readmission Score: 69 21         Current admission status: Inpatient   Preferred Pharmacy:   [address] [place] [address], [address] [place] [address] [address] [place] [address] Natan Pierce  [address] [place] [address]  Phone: [phone] Fax: [phone] [facility] [address] [facility] [address] [facility] 38 210 [facility]  Phone: [phone] Fax: [phone]    Primary Care Provider: Tatiana Light MD    Primary Insurance: [facility]  Secondary Insurance:     DISCHARGE DETAILS:                 Treatment Team Recommendation: Home with [address]  Discharge Destination Plan[de-identified] Home with [address]               Additional Comments: Met with patient at d/c to discuss discharge plan  Patient requested that he be able to "swap out" his current rolling walker with a rollator, CM contacted DME company, unfortunately this is not possible and insurance will not pay for a rollator at this time  Patient unhappy about this information, CM offered to order a rollator, but informed patient would get a bill, patient stated he would 'look elsewhere'  Discussed current recommendation for home health/PT, referral placed to [address] [place] [address] , [facility] to follow

## 2022-10-14 LAB
ANION GAP SERPL CALCULATED.3IONS-SCNC: 6 MMOL/L (ref 4–13)
BUN SERPL-MCNC: 72 MG/DL (ref 5–25)
CALCIUM SERPL-MCNC: 8.1 MG/DL (ref 8.3–10.1)
CHLORIDE SERPL-SCNC: 108 MMOL/L (ref 96–108)
CO2 SERPL-SCNC: 25 MMOL/L (ref 21–32)
CREAT SERPL-MCNC: 2.71 MG/DL (ref 0.6–1.3)
GFR SERPL CREATININE-BSD FRML MDRD: 21 ML/MIN/1.73SQ M
GLUCOSE SERPL-MCNC: 104 MG/DL (ref 65–140)
GLUCOSE SERPL-MCNC: 130 MG/DL (ref 65–140)
GLUCOSE SERPL-MCNC: 182 MG/DL (ref 65–140)
GLUCOSE SERPL-MCNC: 368 MG/DL (ref 65–140)
GLUCOSE SERPL-MCNC: 43 MG/DL (ref 65–140)
GLUCOSE SERPL-MCNC: 49 MG/DL (ref 65–140)
GLUCOSE SERPL-MCNC: 53 MG/DL (ref 65–140)
POTASSIUM SERPL-SCNC: 4.5 MMOL/L (ref 3.5–5.3)
SODIUM SERPL-SCNC: 139 MMOL/L (ref 135–147)

## 2022-10-14 PROCEDURE — 99232 SBSQ HOSP IP/OBS MODERATE 35: CPT | Performed by: INTERNAL MEDICINE

## 2022-10-14 PROCEDURE — 99232 SBSQ HOSP IP/OBS MODERATE 35: CPT | Performed by: NURSE PRACTITIONER

## 2022-10-14 PROCEDURE — 80048 BASIC METABOLIC PNL TOTAL CA: CPT | Performed by: INTERNAL MEDICINE

## 2022-10-14 PROCEDURE — 94640 AIRWAY INHALATION TREATMENT: CPT

## 2022-10-14 PROCEDURE — 99222 1ST HOSP IP/OBS MODERATE 55: CPT | Performed by: INTERNAL MEDICINE

## 2022-10-14 PROCEDURE — 94760 N-INVAS EAR/PLS OXIMETRY 1: CPT

## 2022-10-14 PROCEDURE — 82948 REAGENT STRIP/BLOOD GLUCOSE: CPT

## 2022-10-14 RX ORDER — INSULIN ASPART 100 [IU]/ML
40 INJECTION, SUSPENSION SUBCUTANEOUS
Status: DISCONTINUED | OUTPATIENT
Start: 2022-10-14 | End: 2022-10-14

## 2022-10-14 RX ORDER — FUROSEMIDE 40 MG/1
40 TABLET ORAL DAILY
Status: DISCONTINUED | OUTPATIENT
Start: 2022-10-14 | End: 2022-10-15 | Stop reason: HOSPADM

## 2022-10-14 RX ORDER — INSULIN ASPART 100 [IU]/ML
40 INJECTION, SUSPENSION SUBCUTANEOUS
Status: DISCONTINUED | OUTPATIENT
Start: 2022-10-15 | End: 2022-10-15 | Stop reason: HOSPADM

## 2022-10-14 RX ORDER — INSULIN ASPART 100 [IU]/ML
50 INJECTION, SUSPENSION SUBCUTANEOUS
Status: DISCONTINUED | OUTPATIENT
Start: 2022-10-15 | End: 2022-10-15 | Stop reason: HOSPADM

## 2022-10-14 RX ORDER — INSULIN ASPART 100 [IU]/ML
30 INJECTION, SUSPENSION SUBCUTANEOUS
Status: DISCONTINUED | OUTPATIENT
Start: 2022-10-14 | End: 2022-10-15 | Stop reason: HOSPADM

## 2022-10-14 RX ORDER — DEXTROSE MONOHYDRATE 25 G/50ML
INJECTION, SOLUTION INTRAVENOUS
Status: COMPLETED
Start: 2022-10-14 | End: 2022-10-14

## 2022-10-14 RX ADMIN — GABAPENTIN 100 MG: 100 CAPSULE ORAL at 08:59

## 2022-10-14 RX ADMIN — CARVEDILOL 6.25 MG: 6.25 TABLET, FILM COATED ORAL at 08:59

## 2022-10-14 RX ADMIN — INSULIN ASPART 45 UNITS: 100 INJECTION, SUSPENSION SUBCUTANEOUS at 13:11

## 2022-10-14 RX ADMIN — LEVALBUTEROL HYDROCHLORIDE 1.25 MG: 1.25 SOLUTION, CONCENTRATE RESPIRATORY (INHALATION) at 13:29

## 2022-10-14 RX ADMIN — INSULIN ASPART 45 UNITS: 100 INJECTION, SUSPENSION SUBCUTANEOUS at 08:58

## 2022-10-14 RX ADMIN — IPRATROPIUM BROMIDE 0.5 MG: 0.5 SOLUTION RESPIRATORY (INHALATION) at 08:20

## 2022-10-14 RX ADMIN — GABAPENTIN 100 MG: 100 CAPSULE ORAL at 18:10

## 2022-10-14 RX ADMIN — FUROSEMIDE 40 MG: 40 TABLET ORAL at 13:05

## 2022-10-14 RX ADMIN — Medication 3 MG: at 20:36

## 2022-10-14 RX ADMIN — UMECLIDINIUM 1 PUFF: 62.5 AEROSOL, POWDER ORAL at 13:03

## 2022-10-14 RX ADMIN — PANTOPRAZOLE SODIUM 40 MG: 40 TABLET, DELAYED RELEASE ORAL at 06:43

## 2022-10-14 RX ADMIN — PRAVASTATIN SODIUM 40 MG: 40 TABLET ORAL at 18:04

## 2022-10-14 RX ADMIN — APIXABAN 5 MG: 5 TABLET, FILM COATED ORAL at 18:04

## 2022-10-14 RX ADMIN — LEVALBUTEROL HYDROCHLORIDE 1.25 MG: 1.25 SOLUTION, CONCENTRATE RESPIRATORY (INHALATION) at 08:21

## 2022-10-14 RX ADMIN — ACETAMINOPHEN 650 MG: 325 TABLET, FILM COATED ORAL at 08:59

## 2022-10-14 RX ADMIN — CARVEDILOL 6.25 MG: 6.25 TABLET, FILM COATED ORAL at 18:04

## 2022-10-14 RX ADMIN — INSULIN LISPRO 10 UNITS: 100 INJECTION, SOLUTION INTRAVENOUS; SUBCUTANEOUS at 13:04

## 2022-10-14 RX ADMIN — ASPIRIN 81 MG: 81 TABLET, COATED ORAL at 08:59

## 2022-10-14 RX ADMIN — INSULIN ASPART 30 UNITS: 100 INJECTION, SUSPENSION SUBCUTANEOUS at 18:04

## 2022-10-14 RX ADMIN — ACETAMINOPHEN 650 MG: 325 TABLET, FILM COATED ORAL at 18:06

## 2022-10-14 RX ADMIN — INSULIN LISPRO 2 UNITS: 100 INJECTION, SOLUTION INTRAVENOUS; SUBCUTANEOUS at 21:35

## 2022-10-14 RX ADMIN — CYCLOBENZAPRINE HYDROCHLORIDE 10 MG: 10 TABLET, FILM COATED ORAL at 13:11

## 2022-10-14 RX ADMIN — TAMSULOSIN HYDROCHLORIDE 0.4 MG: 0.4 CAPSULE ORAL at 18:04

## 2022-10-14 RX ADMIN — IPRATROPIUM BROMIDE 0.5 MG: 0.5 SOLUTION RESPIRATORY (INHALATION) at 13:29

## 2022-10-14 RX ADMIN — APIXABAN 5 MG: 5 TABLET, FILM COATED ORAL at 08:59

## 2022-10-14 RX ADMIN — DEXTROSE MONOHYDRATE 25 ML: 25 INJECTION, SOLUTION INTRAVENOUS at 07:08

## 2022-10-14 RX ADMIN — Medication 4 G: at 06:42

## 2022-10-14 RX ADMIN — FLUTICASONE FUROATE AND VILANTEROL TRIFENATATE 1 PUFF: 100; 25 POWDER RESPIRATORY (INHALATION) at 13:03

## 2022-10-14 RX ADMIN — PREDNISONE 40 MG: 20 TABLET ORAL at 08:59

## 2022-10-14 RX ADMIN — FERROUS SULFATE TAB 325 MG (65 MG ELEMENTAL FE) 325 MG: 325 (65 FE) TAB at 08:59

## 2022-10-14 RX ADMIN — GABAPENTIN 100 MG: 100 CAPSULE ORAL at 20:36

## 2022-10-14 NOTE — PROGRESS NOTES
NEPHROLOGY PROGRESS NOTE   Cesario Thompson  78 y o  male MRN: [id number]  Unit/Bed#: 2 218-01 Encounter: [id number]      ASSESSMENT & PLAN:  1  Acute kidney injury on top of CKD stage 3 with previous creatinine around 1 4-1 7 though lately seems to be in the low 2s  Admitted with a creatinine of 2 27, creatinine increased to 2 84 on 10/12  ARB and diuretics on hold  Urinalysis on 10/12 bland: no rbc's, no wbc's, no protein  Avoid relative hypotension  Creatinine trending down to 2 71, okay to resume home diuretics  Avoid nephrotoxins, follow low-salt diet  He will need outpatient follow-up, last time he was seen in our office was in 2018  Stable from kidney standpoint of view to be discharged as long as okay with primary team   He will need to resume outpatient follow-up after    2  Chronic kidney disease stage IIIB with previous creatinine in the high 1s though lately in the low 2s    3  Shortness of breath, suspected COPD exacerbation, continue medical management per primary team     4  Chronic diastolic congestive heart failure, atrial fibrillation  Kidney function seems to be improving, okay to resume home diuretics  Once again discussed with patient about importance of following a low-salt diet less than 2 g salt in a day    5  Mild anemia, last hemoglobin 10 2 on 10/11    6  Hypertension, blood pressure stable, keep holding ARB in the setting of acute kidney injury    My plan and recommendations were discussed with Internal Medicine team       SUBJECTIVE:  Patient seen and examined, continues with chronic dyspnea on exertion that seems to be stable, denies any chest pain, no abdominal pain, no nausea, no vomiting    Feels his ankle swelling is worsening        OBJECTIVE:  Current Weight: Weight - Scale: 96 9 kg (213 lb 9 6 oz)  Vitals:    10/14/22 0809   BP:    Pulse:    Resp:    Temp:    SpO2: 94%       Intake/Output Summary (Last 24 hours) at 10/14/2022 1012  Last data filed at 10/14/2022 0901  Gross per 24 hour   Intake 1210 ml   Output 2150 ml   Net -940 ml       General: conscious, cooperative, in not acute distress  Eyes: conjunctivae pink, anicteric sclerae  ENT: lips and mucous membranes moist, oxygen nasal cannula  Neck: supple, no JVD  Chest:  Prolonged expiration, some wheezes and rales  CVS: distinct S1 & S2, normal rate, regular rhythm  Abdomen: soft, non-tender, non-distended, normoactive bowel sounds  Extremities:  Trace to 1+ edema of both legs  Skin: no rash  Neuro: awake, alert, oriented          Medications:    Current Facility-Administered Medications:   •  acetaminophen (TYLENOL) tablet 650 mg, 650 mg, Oral, Q6H PRN, Lachelle Machado DO, 650 mg at 10/14/22 9773  •  apixaban (ELIQUIS) tablet 5 mg, 5 mg, Oral, BID, YOLANDE Fiore, 5 mg at 10/14/22 3233  •  aspirin (ECOTRIN LOW STRENGTH) EC tablet 81 mg, 81 mg, Oral, Daily, Lachelle Machado DO, 81 mg at 10/14/22 7771  •  carvedilol (COREG) tablet 6 25 mg, 6 25 mg, Oral, BID With Meals, Lachelle Machado DO, 6 25 mg at 10/14/22 0189  •  cyclobenzaprine (FLEXERIL) tablet 10 mg, 10 mg, Oral, BID PRN, Lachelle Machado DO  •  ferrous sulfate tablet 325 mg, 325 mg, Oral, Daily With Breakfast, Lachelle Machado DO, 325 mg at 10/14/22 0859  •  fluticasone-vilanterol (BREO ELLIPTA) 100-25 mcg/inh inhaler 1 puff, 1 puff, Inhalation, Daily, Lachelle Machado DO, 1 puff at 10/13/22 0844  •  furosemide (LASIX) tablet 40 mg, 40 mg, Oral, Daily, YOLANDE Bernabe  •  gabapentin (NEURONTIN) capsule 100 mg, 100 mg, Oral, TID, Ac Hubbard DO, 100 mg at 10/14/22 0859  •  insulin aspart protamine-insulin aspart (NovoLOG 70/30) 100 units/mL subcutaneous injection 45 Units, 45 Units, Subcutaneous, TID With Meals, Rosalind Mohamud PA-C 45 Units at 10/14/22 0858  •  insulin lispro (HumaLOG) 100 units/mL subcutaneous injection 2-12 Units, 2-12 Units, Subcutaneous, 4x Daily (AC & HS), 2 Units at 10/13/22 1950 **AND** Fingerstick Glucose (POCT), , , 4x Daily AC and at bedtime, Melissa Crystal PA-C  •  ipratropium (ATROVENT) 0 02 % inhalation solution 0 5 mg, 0 5 mg, Nebulization, TID, Sebastián Drone, DO, 0 5 mg at 10/14/22 0820  •  levalbuterol (Omie Lango) inhalation solution 1 25 mg, 1 25 mg, Nebulization, TID, Sebastián Drone, DO, 1 25 mg at 10/14/22 3680  •  melatonin tablet 3 mg, 3 mg, Oral, HS PRN, Melissa Crystal PA-C, 3 mg at 10/13/22 1951  •  pantoprazole (PROTONIX) EC tablet 40 mg, 40 mg, Oral, Early Morning, Sangeetha M Malou, CRNP, 40 mg at 10/14/22 6403  •  pravastatin (PRAVACHOL) tablet 40 mg, 40 mg, Oral, After Bernie Rodriguez, DO, 40 mg at 10/13/22 1949  •  predniSONE tablet 40 mg, 40 mg, Oral, Daily, Sangeetha M Malou, CRNP, 40 mg at 10/14/22 8402  •  tamsulosin (FLOMAX) capsule 0 4 mg, 0 4 mg, Oral, After Bernie Rodriguez, DO, 0 4 mg at 10/13/22 1949  •  umeclidinium bromide (INCRUSE ELLIPTA) 62 5 mcg/inh inhaler AEPB 1 puff, 1 puff, Inhalation, Daily, Sebastián Drone, DO, 1 puff at 10/13/22 0844    Invasive Devices:        Lab Results:   Results from last 7 days   Lab Units 10/14/22  0637 10/13/22  0646 10/12/22  0910 10/11/22  0507 10/10/22  0929   WBC Thousand/uL  --   --   --  6 92 13 31*   HEMOGLOBIN g/dL  --   --   --  10 2* 12 2   HEMATOCRIT %  --   --   --  30 8* 35 4*   PLATELETS Thousands/uL  --   --   --  103* 112*   SODIUM mmol/L 139 137 135 135 135   POTASSIUM mmol/L 4 5 4 5 4 3 4 6 4 4   CHLORIDE mmol/L 108 107 104 104 105   CO2 mmol/L 25 25 25 24 22   BUN mg/dL 72* 74* 70* 50* 41*   CREATININE mg/dL 2 71* 2 81* 2 84* 2 51* 2 27*   CALCIUM mg/dL 8 1* 8 3 8 5 8 3 8 5   PHOSPHORUS mg/dL  --  4 9*  --   --   --    ALK PHOS U/L  --   --   --   --  87   ALT U/L  --   --   --   --  15   AST U/L  --   --   --   --  8       Portions of the record may have been created with voice recognition software   Occasional wrong word or "sound a like" substitutions may have occurred due to the inherent limitations of voice recognition software  Read the chart carefully and recognize, using context, where substitutions have occurred  If you have any questions, please contact the dictating provider

## 2022-10-14 NOTE — ASSESSMENT & PLAN NOTE
Lab Results   Component Value Date    EGFR 21 10/14/2022    EGFR 21 10/13/2022    EGFR 20 10/12/2022    CREATININE 2 71 (H) 10/14/2022    CREATININE 2 81 (H) 10/13/2022    CREATININE 2 84 (H) 10/12/2022   · MARANDA on CKD, prior baseline appears to be 1 5-1 7, per nephrology baseline appears to be more in the 2's  Admitted with a creat of 2 27 on admission, increased to 2 84 despite holding diuretics/ARB and giving gentle IV hydration  · C/w holding losartan  · PVR negative  · Nephrology cleared to restart lasix 40mg daily PO today and cleared for d/c with outpatient f/u  · Avoid nephrotoxins and hypotension  · BMP in a m

## 2022-10-14 NOTE — PLAN OF CARE
Problem: PAIN - ADULT  Goal: Verbalizes/displays adequate comfort level or baseline comfort level  Description: Interventions:  - Encourage patient to monitor pain and request assistance  - Assess pain using appropriate pain scale  - Administer analgesics based on type and severity of pain and evaluate response  - Implement non-pharmacological measures as appropriate and evaluate response  - Consider cultural and social influences on pain and pain management  - Notify physician/advanced practitioner if interventions unsuccessful or patient reports new pain  Outcome: Progressing     Problem: INFECTION - ADULT  Goal: Absence of fever/infection during neutropenic period  Description: INTERVENTIONS:  - Monitor WBC    Outcome: Progressing     Problem: Prexisting or High Potential for Compromised Skin Integrity  Goal: Skin integrity is maintained or improved  Description: INTERVENTIONS:  - Identify patients at risk for skin breakdown  - Assess and monitor skin integrity  - Assess and monitor nutrition and hydration status  - Monitor labs   - Assess for incontinence   - Turn and reposition patient  - Assist with mobility/ambulation  - Relieve pressure over bony prominences  - Avoid friction and shearing  - Provide appropriate hygiene as needed including keeping skin clean and dry  - Evaluate need for skin moisturizer/barrier cream  - Collaborate with interdisciplinary team   - Patient/family teaching  - Consider wound care consult   Outcome: Progressing

## 2022-10-14 NOTE — ASSESSMENT & PLAN NOTE
Lab Results   Component Value Date    HGBA1C 9 7 (H) 10/10/2022       Recent Labs     10/14/22  0636 10/14/22  0701 10/14/22  0734 10/14/22  1206   POCGLU 49* 43* 130 368*     Hx of poor control  · Hyperglycemic on admission requiring insulin gtt, transitioned to 70/30 45 units TID with continued poor control  · Consult endocrinology  · Decrease 70/30 to 40 units TID given hypoglycemia   · Hypoglycemia protocol  · Continue carb control diet  · PCP manages insulin, Recommend patient to follow up with endocrinology outpatient

## 2022-10-14 NOTE — ASSESSMENT & PLAN NOTE
Noted in prior EKG's as well but w/o formal evaluation per cardiology, Presenting EKG reading confirmed afib per cardiology  · Continue Coreg  · [id number] of 6, will require anticoagulation  Started on Eliquis, $10 per month, script sent to outpatient pharmacy     · Will need outpatient follow-up with Cardiology

## 2022-10-14 NOTE — OCCUPATIONAL THERAPY NOTE
Occupational Therapy         Patient Name: Shant Smallwood    Today's Date: 10/14/2022       10/14/22 1200   OT Last Visit   OT Visit Date 10/14/22   Note Type   Note Type Cancelled Session   Cancel Reasons Other     Pt refused - reports his BS was low this morning and he feels dizzy    [employer]

## 2022-10-14 NOTE — PHYSICAL THERAPY NOTE
Physical Therapy Cancellation Note    Patient's Name: Brian Huerta Sr        10/14/22 1203   PT Last Visit   PT Visit Date 10/14/22   Note Type   Note Type Cancelled Session   Cancel Reasons Other       Orders received, chart reviewed  Pt reporting that he is too dizzy from low blood sugar levels this am to participate in therapy  Will hold PT at this time and follow for treatment as medically appropriate  Thank you       Piter Du, PT, DPT

## 2022-10-14 NOTE — CASE MANAGEMENT
Case Management Discharge Planning Note    Patient name Dylon Jorge  Location 2 218/CW2 218-01 MRN [id number]  : 1945 Date 10/14/2022       Current Admission Date: 10/10/2022  Current Admission Diagnosis:SOB (shortness of breath)   Patient Active Problem List    Diagnosis Date Noted   • A-[facility]) 10/10/2022   • Frequent hospital admissions 2022   • Medication management 2022   • Chest pain, musculoskeletal 2022   • Hypothermia 2022   • Epididymitis, bilateral 06/15/2022   • Acute respiratory failure with hypoxia ([address] ) 2022   • Chronic left-sided low back pain without sciatica 2022   • SOB (shortness of breath) 2022   • CKD (chronic kidney disease) stage 3, GFR 30-59 ml/min ([address] ) 2022   • History of prostate cancer 2022   • Adenocarcinoma of lung ([address] ) 2022   • Fracture of navicular bone of left foot 2021   • Chronic respiratory failure with hypoxia ([address] ) 04/15/2021   • PAD (peripheral artery disease) ([address] ) 2021   • DDD (degenerative disc disease), lumbar 2020   • Anemia, iron deficiency 2020   • Lung nodule 2020   • Pulmonary hypertension ([address] ) 2019   • JACQUELINE (obstructive sleep apnea) 2019   • COPD with acute exacerbation ([address] ) 2019   • Oxygen dependent 2018   • Acute metabolic encephalopathy    • RBBB 2018   • Acute on chronic diastolic (congestive) heart failure 2018   • CAD (coronary artery disease) 2018   • Chronic diastolic heart failure ([address] ) 2018   • Pleural effusion on right 10/31/2017   • COPD, severe ([address] ) 2017   • Diabetic neuropathy ([address] ) 2017   • Essential hypertension 2016   • Venous stasis dermatitis of both lower extremities 11/15/2016   • Type 2 diabetes mellitus with hyperglycemia, with long-term current use of insulin ([facility] ) 2016   • COPD exacerbation ([facility] ) 2016   • HLD (hyperlipidemia) 2016 LOS (days): 4  Geometric Mean LOS (GMLOS) (days): 3 10  Days to GMLOS:-1 1     OBJECTIVE:  Risk of Unplanned Readmission Score: 71 1         Current admission status: Inpatient   Preferred Pharmacy:   [facility], [address] [place] [address] [place] [address]  Phone: [phone] Fax: [phone] [address] [address] [facility] [address] [facility] 38 210 [facility]  Phone: [phone] Fax: [phone]    Primary Care Provider: Mohan Grimaldo MD    Primary Insurance: [facility]  Secondary Insurance:     DISCHARGE DETAILS:       [address]         Is the patient interested in [facility] at discharge?: Yes  [address] requested[de-identified] Nursing, Physical [address] Name[de-identified] [facility] Provider[de-identified] PCP  Home Health Services Needed[de-identified] Heart Failure Management, Oxygen Via Nasal Cannula, Gait/ADL Training, Evaluate Functional Status and Safety, Strengthening/Theraputic Exercises to Improve Function  Homebound Criteria Met[de-identified] Requires the Assistance of Another Person for Safe Ambulation or to Leave the Home, Uses an Assist Device (i e  cane, walker, etc)  Supporting Clincal Findings[de-identified] Requires Oxygen, Limited Endurance    DME Referral Provided  Referral made for DME?: No           Additional Comments: Patient expected to be medically cleared this weekend, home with home health, [facility] has accepted referral   Patient had requested CM "swap out" his walker for a rollator, discussed with [employer], however this is not possible  Discussed with patient that he would have to pay out of pocket for a rollator, he was unhappy with this news  CM will continue to follow, family should be able to provide d/c transportation

## 2022-10-14 NOTE — PROGRESS NOTES
[facility]  Progress Note - Markel Jones Sr  1945, 78 y o  male MRN: [id number]  Unit/Bed#: [facility] 218-01 Encounter: [id number]  Primary Care Provider: Froilan Menjivar MD   Date and time admitted to hospital: 10/10/2022  9:01 AM    * SOB (shortness of breath)  Assessment & Plan  Admitted with shortness of breath and wheezing as well as increasing oxygen requirements  Met SIRS criteria on admission, infectious etiologies ruled out  · COVID/influenza/RSV negative  · Chest x-ray without any acute findings  · Improved with intravenous steroids, treated as COPD exacerbation, switched to oral prednisone 10/11 to complete 5 day course  · Rest of plan as below    CKD (chronic kidney disease) stage 3, GFR 30-59 ml/min [facility])  Assessment & Plan  Lab Results   Component Value Date    EGFR 21 10/14/2022    EGFR 21 10/13/2022    EGFR 20 10/12/2022    CREATININE 2 71 (H) 10/14/2022    CREATININE 2 81 (H) 10/13/2022    CREATININE 2 84 (H) 10/12/2022   · MARANDA on CKD, prior baseline appears to be 1 5-1 7, per nephrology baseline appears to be more in the 2's  Admitted with a creat of 2 27 on admission, increased to 2 84 despite holding diuretics/ARB and giving gentle IV hydration  · C/w holding losartan  · PVR negative  · Nephrology cleared to restart lasix 40mg daily PO today and cleared for d/c with outpatient f/u  · Avoid nephrotoxins and hypotension  · BMP in a m      Chronic diastolic heart failure (HCC)  Assessment & Plan  Wt Readings from Last 3 Encounters:   10/14/22 96 9 kg (213 lb 9 6 oz)   09/17/22 101 kg (222 lb)   09/02/22 107 kg (235 lb 8 3 oz)   · Continue beta-blocker  · Resume lasix 40mg daily today per nephrology recommendation   · I&Os, daily weights, low-sodium diet- per RN patient is not following a fluid restriction   · Monitor volume status    Type 2 diabetes mellitus with hyperglycemia, with long-term current use of insulin [facility])  Assessment & Plan  Lab Results Component Value Date    HGBA1C 9 7 (H) 10/10/2022       Recent Labs     10/14/22  0636 10/14/22  0701 10/14/22  0734 10/14/22  1206   POCGLU 49* 43* 130 368*     Hx of poor control  · Hyperglycemic on admission requiring insulin gtt, transitioned to 70/30 45 units TID with continued poor control  · Consult endocrinology  · Decrease 70/30 to 40 units TID given hypoglycemia   · Hypoglycemia protocol  · Continue carb control diet  · PCP manages insulin, Recommend patient to follow up with endocrinology outpatient    A-fib [facility])  Assessment & Plan  Noted in prior EKG's as well but w/o formal evaluation per cardiology, Presenting EKG reading confirmed afib per cardiology  · Continue Coreg  · [id number] of 6, will require anticoagulation  Started on Eliquis, $10 per month, script sent to outpatient pharmacy  · Will need outpatient follow-up with Cardiology     Chronic respiratory failure with hypoxia ([facility] )  Assessment & Plan  · Wears 2 L PRN at baseline    CAD (coronary artery disease)  Assessment & Plan  · Had chest pain on admission with negative troponins and nonischemic EKG  No further complaints  · Continue beta-blocker, aspirin, statin  · Of note had stress test in May which was negative for ischemia  Essential hypertension  Assessment & Plan  · Blood pressure acceptable  · C/w holding ARB in the setting of MARANDA     COPD exacerbation ([facility] )  Assessment & Plan  · Plan as above          VTE Pharmacologic Prophylaxis: VTE Score: 3 Moderate Risk (Score 3-4) - Pharmacological DVT Prophylaxis Ordered: apixaban (Eliquis)  Patient Centered Rounds: I performed bedside rounds with nursing staff today  Discussions with Specialists or Other Care Team Provider: d/w RN d/w nephrology     Education and Discussions with Family / Patient: Patient declined call to   Time Spent for Care: 30 minutes  More than 50% of total time spent on counseling and coordination of care as described above      Current Length of Stay: 4 day(s)  Current Patient Status: Inpatient   Certification Statement: The patient will continue to require additional inpatient hospital stay due to monitor BMP and glucose   Discharge Plan: Anticipate discharge in 24-48 hrs to home  Code Status: Level 1 - Full Code    Subjective:   Pt concerned about lower extremity edma  denies any other complaints  Did admit to feeling dizzy when his blood sugar was low but denies any other complaints  Objective:     Vitals:   Temp (24hrs), Av 9 °F (36 6 °C), Min:97 5 °F (36 4 °C), Max:98 2 °F (36 8 °C)    Temp:  [97 5 °F (36 4 °C)-98 2 °F (36 8 °C)] 97 5 °F (36 4 °C)  HR:  [64-75] 64  Resp:  [18] 18  BP: (114-133)/(60-66) 114/66  SpO2:  [94 %-96 %] 95 %  Body mass index is 28 97 kg/m²  Input and Output Summary (last 24 hours): Intake/Output Summary (Last 24 hours) at 10/14/2022 1558  Last data filed at 10/14/2022 1301  Gross per 24 hour   Intake 1450 ml   Output 2500 ml   Net -1050 ml       Physical Exam:   Physical Exam  Vitals and nursing note reviewed  Constitutional:       General: He is not in acute distress  Appearance: He is well-developed  He is not ill-appearing  Cardiovascular:      Rate and Rhythm: Normal rate and regular rhythm  Heart sounds: Normal heart sounds  No murmur heard  Pulmonary:      Effort: Pulmonary effort is normal  No respiratory distress  Breath sounds: Normal breath sounds  Abdominal:      General: Bowel sounds are normal  There is no distension  Palpations: Abdomen is soft  Tenderness: There is no abdominal tenderness  Musculoskeletal:         General: Swelling present  Right lower leg: Edema (moderate) present  Left lower leg: Edema (moderate) present  Skin:     General: Skin is warm and dry  Neurological:      General: No focal deficit present  Mental Status: He is alert  Mental status is at baseline     Psychiatric:         Mood and Affect: Mood normal  Additional Data:     Labs:  Results from last 7 days   Lab Units 10/11/22  0507 10/10/22  0929   WBC Thousand/uL 6 92 13 31*   HEMOGLOBIN g/dL 10 2* 12 2   HEMATOCRIT % 30 8* 35 4*   PLATELETS Thousands/uL 103* 112*   NEUTROS PCT %  --  85*   LYMPHS PCT %  --  6*   MONOS PCT %  --  7   EOS PCT %  --  1     Results from last 7 days   Lab Units 10/14/22  0637 10/11/22  0507 10/10/22  0929   SODIUM mmol/L 139   < > 135   POTASSIUM mmol/L 4 5   < > 4 4   CHLORIDE mmol/L 108   < > 105   CO2 mmol/L 25   < > 22   BUN mg/dL 72*   < > 41*   CREATININE mg/dL 2 71*   < > 2 27*   ANION GAP mmol/L 6   < > 8   CALCIUM mg/dL 8 1*   < > 8 5   ALBUMIN g/dL  --   --  2 8*   TOTAL BILIRUBIN mg/dL  --   --  0 29   ALK PHOS U/L  --   --  87   ALT U/L  --   --  15   AST U/L  --   --  8   GLUCOSE RANDOM mg/dL 53*   < > 359*    < > = values in this interval not displayed           Results from last 7 days   Lab Units 10/14/22  1206 10/14/22  0734 10/14/22  0701 10/14/22  0636 10/13/22  2126 10/13/22  1838 10/13/22  1757 10/13/22  1147 10/13/22  0717 10/13/22  0638 10/12/22  2042 10/12/22  1607   POC GLUCOSE mg/dl 368* 130 43* 49* 192* 167* 86 247* 82 67 173* 149*     Results from last 7 days   Lab Units 10/10/22  0929   HEMOGLOBIN A1C % 9 7*           Lines/Drains:  Invasive Devices  Report    Peripheral Intravenous Line  Duration           Peripheral IV 10/10/22 Right Forearm 4 days                      Imaging: Reviewed radiology reports from this admission including: chest xray    Recent Cultures (last 7 days):         Last 24 Hours Medication List:   Current Facility-Administered Medications   Medication Dose Route Frequency Provider Last Rate   • acetaminophen  650 mg Oral Q6H PRN Sebastián Preciado, DO     • apixaban  5 mg Oral BID YOLANDE Fiore     • aspirin  81 mg Oral Daily Sebastián Drone, DO     • carvedilol  6 25 mg Oral BID With Meals Sebastián Preciado DO     • cyclobenzaprine  10 mg Oral BID PRN Aundrea Lock, DO     • ferrous sulfate  325 mg Oral Daily With Breakfast Aundrea Lock, DO     • fluticasone-vilanterol  1 puff Inhalation Daily Aundrea Lock, DO     • furosemide  40 mg Oral Daily YOLANDE Nickerson     • gabapentin  100 mg Oral TID Aundrea Lock, DO     • insulin aspart protamine-insulin aspart  40 Units Subcutaneous TID With Meals YOLANDE Nickerson     • insulin lispro  2-12 Units Subcutaneous 4x Daily (AC & HS) Alexa Handy PA-C     • ipratropium  0 5 mg Nebulization TID Aundrea Lock, DO     • levalbuterol  1 25 mg Nebulization TID Aundrea Lock, DO     • melatonin  3 mg Oral HS PRN Alexa Handy PA-C     • pantoprazole  40 mg Oral Early Morning YOLANDE Fiore     • pravastatin  40 mg Oral After Alexys Fort, DO     • predniSONE  40 mg Oral Daily YOLANDE Fiore     • tamsulosin  0 4 mg Oral After Alexys Fort, DO     • umeclidinium bromide  1 puff Inhalation Daily Aundrea Lock, DO          Today, Patient Was Seen By: Zi Hernandez    **Please Note: This note may have been constructed using a voice recognition system  **

## 2022-10-14 NOTE — ASSESSMENT & PLAN NOTE
Wt Readings from Last 3 Encounters:   10/14/22 96 9 kg (213 lb 9 6 oz)   09/17/22 101 kg (222 lb)   09/02/22 107 kg (235 lb 8 3 oz)   · Continue beta-blocker  · Resume lasix 40mg daily today per nephrology recommendation   · I&Os, daily weights, low-sodium diet- per RN patient is not following a fluid restriction   · Monitor volume status

## 2022-10-14 NOTE — NURSING NOTE
Patient has been frequently verbally abusive/agressive towards staff throughout the evening and night and continues to yell at and belittle staff if his demands aren't met within a few minutes  Staff has regularly redirected patient to explain that there are other patients on the floor, and sometimes the RNs or PCA cannot get his baby powder or pretzels in a timely fashion if other patients are having chest pain or are short of breath  Will continue to monitor

## 2022-10-15 VITALS
RESPIRATION RATE: 20 BRPM | WEIGHT: 217.69 LBS | HEART RATE: 66 BPM | DIASTOLIC BLOOD PRESSURE: 66 MMHG | TEMPERATURE: 97.5 F | OXYGEN SATURATION: 93 % | BODY MASS INDEX: 29.52 KG/M2 | SYSTOLIC BLOOD PRESSURE: 94 MMHG

## 2022-10-15 LAB
ANION GAP SERPL CALCULATED.3IONS-SCNC: 6 MMOL/L (ref 4–13)
BUN SERPL-MCNC: 72 MG/DL (ref 5–25)
CALCIUM SERPL-MCNC: 8.2 MG/DL (ref 8.3–10.1)
CHLORIDE SERPL-SCNC: 108 MMOL/L (ref 96–108)
CO2 SERPL-SCNC: 25 MMOL/L (ref 21–32)
CREAT SERPL-MCNC: 2.66 MG/DL (ref 0.6–1.3)
ERYTHROCYTE [DISTWIDTH] IN BLOOD BY AUTOMATED COUNT: 15.1 % (ref 11.6–15.1)
GFR SERPL CREATININE-BSD FRML MDRD: 22 ML/MIN/1.73SQ M
GLUCOSE SERPL-MCNC: 129 MG/DL (ref 65–140)
GLUCOSE SERPL-MCNC: 159 MG/DL (ref 65–140)
GLUCOSE SERPL-MCNC: 215 MG/DL (ref 65–140)
GLUCOSE SERPL-MCNC: 218 MG/DL (ref 65–140)
GLUCOSE SERPL-MCNC: 224 MG/DL (ref 65–140)
HCT VFR BLD AUTO: 32.3 % (ref 36.5–49.3)
HGB BLD-MCNC: 10.3 G/DL (ref 12–17)
MCH RBC QN AUTO: 27.1 PG (ref 26.8–34.3)
MCHC RBC AUTO-ENTMCNC: 31.9 G/DL (ref 31.4–37.4)
MCV RBC AUTO: 85 FL (ref 82–98)
PLATELET # BLD AUTO: 122 THOUSANDS/UL (ref 149–390)
PMV BLD AUTO: 11.4 FL (ref 8.9–12.7)
POTASSIUM SERPL-SCNC: 4.8 MMOL/L (ref 3.5–5.3)
RBC # BLD AUTO: 3.8 MILLION/UL (ref 3.88–5.62)
SODIUM SERPL-SCNC: 139 MMOL/L (ref 135–147)
WBC # BLD AUTO: 6.23 THOUSAND/UL (ref 4.31–10.16)

## 2022-10-15 PROCEDURE — 94760 N-INVAS EAR/PLS OXIMETRY 1: CPT

## 2022-10-15 PROCEDURE — 80048 BASIC METABOLIC PNL TOTAL CA: CPT | Performed by: NURSE PRACTITIONER

## 2022-10-15 PROCEDURE — 82948 REAGENT STRIP/BLOOD GLUCOSE: CPT

## 2022-10-15 PROCEDURE — 85027 COMPLETE CBC AUTOMATED: CPT | Performed by: NURSE PRACTITIONER

## 2022-10-15 PROCEDURE — 94640 AIRWAY INHALATION TREATMENT: CPT

## 2022-10-15 PROCEDURE — 99232 SBSQ HOSP IP/OBS MODERATE 35: CPT | Performed by: INTERNAL MEDICINE

## 2022-10-15 PROCEDURE — 99239 HOSP IP/OBS DSCHRG MGMT >30: CPT | Performed by: INTERNAL MEDICINE

## 2022-10-15 RX ORDER — PANTOPRAZOLE SODIUM 40 MG/1
40 TABLET, DELAYED RELEASE ORAL
Qty: 30 TABLET | Refills: 1 | Status: SHIPPED | OUTPATIENT
Start: 2022-10-16 | End: 2022-10-24

## 2022-10-15 RX ORDER — INSULIN LISPRO PROTAMIN/LISPRO 75-25/ML
45 VIAL (ML) SUBCUTANEOUS 3 TIMES DAILY
Qty: 40.5 ML | Refills: 0 | Status: SHIPPED | OUTPATIENT
Start: 2022-10-15 | End: 2022-10-24

## 2022-10-15 RX ADMIN — UMECLIDINIUM 1 PUFF: 62.5 AEROSOL, POWDER ORAL at 10:07

## 2022-10-15 RX ADMIN — LEVALBUTEROL HYDROCHLORIDE 1.25 MG: 1.25 SOLUTION, CONCENTRATE RESPIRATORY (INHALATION) at 13:45

## 2022-10-15 RX ADMIN — IPRATROPIUM BROMIDE 0.5 MG: 0.5 SOLUTION RESPIRATORY (INHALATION) at 13:45

## 2022-10-15 RX ADMIN — ACETAMINOPHEN 650 MG: 325 TABLET, FILM COATED ORAL at 16:07

## 2022-10-15 RX ADMIN — ACETAMINOPHEN 650 MG: 325 TABLET, FILM COATED ORAL at 06:45

## 2022-10-15 RX ADMIN — ACETAMINOPHEN 650 MG: 325 TABLET, FILM COATED ORAL at 01:03

## 2022-10-15 RX ADMIN — PREDNISONE 40 MG: 20 TABLET ORAL at 09:57

## 2022-10-15 RX ADMIN — APIXABAN 5 MG: 5 TABLET, FILM COATED ORAL at 09:56

## 2022-10-15 RX ADMIN — GABAPENTIN 100 MG: 100 CAPSULE ORAL at 16:08

## 2022-10-15 RX ADMIN — FERROUS SULFATE TAB 325 MG (65 MG ELEMENTAL FE) 325 MG: 325 (65 FE) TAB at 09:56

## 2022-10-15 RX ADMIN — TAMSULOSIN HYDROCHLORIDE 0.4 MG: 0.4 CAPSULE ORAL at 16:08

## 2022-10-15 RX ADMIN — IPRATROPIUM BROMIDE 0.5 MG: 0.5 SOLUTION RESPIRATORY (INHALATION) at 07:49

## 2022-10-15 RX ADMIN — INSULIN ASPART 30 UNITS: 100 INJECTION, SUSPENSION SUBCUTANEOUS at 16:20

## 2022-10-15 RX ADMIN — GABAPENTIN 100 MG: 100 CAPSULE ORAL at 09:56

## 2022-10-15 RX ADMIN — FLUTICASONE FUROATE AND VILANTEROL TRIFENATATE 1 PUFF: 100; 25 POWDER RESPIRATORY (INHALATION) at 10:06

## 2022-10-15 RX ADMIN — PRAVASTATIN SODIUM 40 MG: 40 TABLET ORAL at 16:08

## 2022-10-15 RX ADMIN — LEVALBUTEROL HYDROCHLORIDE 1.25 MG: 1.25 SOLUTION, CONCENTRATE RESPIRATORY (INHALATION) at 07:49

## 2022-10-15 RX ADMIN — CARVEDILOL 6.25 MG: 6.25 TABLET, FILM COATED ORAL at 09:57

## 2022-10-15 RX ADMIN — FUROSEMIDE 40 MG: 40 TABLET ORAL at 09:57

## 2022-10-15 RX ADMIN — INSULIN LISPRO 4 UNITS: 100 INJECTION, SOLUTION INTRAVENOUS; SUBCUTANEOUS at 06:45

## 2022-10-15 RX ADMIN — ASPIRIN 81 MG: 81 TABLET, COATED ORAL at 09:56

## 2022-10-15 RX ADMIN — CARVEDILOL 6.25 MG: 6.25 TABLET, FILM COATED ORAL at 16:08

## 2022-10-15 RX ADMIN — INSULIN ASPART 50 UNITS: 100 INJECTION, SUSPENSION SUBCUTANEOUS at 09:57

## 2022-10-15 RX ADMIN — APIXABAN 5 MG: 5 TABLET, FILM COATED ORAL at 16:08

## 2022-10-15 RX ADMIN — PANTOPRAZOLE SODIUM 40 MG: 40 TABLET, DELAYED RELEASE ORAL at 06:45

## 2022-10-15 NOTE — PLAN OF CARE
Problem: PAIN - ADULT  Goal: Verbalizes/displays adequate comfort level or baseline comfort level  Description: Interventions:  - Encourage patient to monitor pain and request assistance  - Assess pain using appropriate pain scale  - Administer analgesics based on type and severity of pain and evaluate response  - Implement non-pharmacological measures as appropriate and evaluate response  - Consider cultural and social influences on pain and pain management  - Notify physician/advanced practitioner if interventions unsuccessful or patient reports new pain  Outcome: Adequate for Discharge     Problem: INFECTION - ADULT  Goal: Absence or prevention of progression during hospitalization  Description: INTERVENTIONS:  - Assess and monitor for signs and symptoms of infection  - Monitor lab/diagnostic results  - Monitor all insertion sites, i e  indwelling lines, tubes, and drains  - Monitor endotracheal if appropriate and nasal secretions for changes in amount and color  - [place] appropriate cooling/warming therapies per order  - Administer medications as ordered  - Instruct and encourage patient and family to use good hand hygiene technique  - Identify and instruct in appropriate isolation precautions for identified infection/condition  Outcome: Adequate for Discharge  Goal: Absence of fever/infection during neutropenic period  Description: INTERVENTIONS:  - Monitor WBC    Outcome: Adequate for Discharge     Problem: Knowledge Deficit  Goal: Patient/family/caregiver demonstrates understanding of disease process, treatment plan, medications, and discharge instructions  Description: Complete learning assessment and assess knowledge base    Interventions:  - Provide teaching at level of understanding  - Provide teaching via preferred learning methods  Outcome: Adequate for Discharge     Problem: DISCHARGE PLANNING  Goal: Discharge to home or other facility with appropriate resources  Description: INTERVENTIONS:  - Identify barriers to discharge w/patient and caregiver  - Arrange for needed discharge resources and transportation as appropriate  - Identify discharge learning needs (meds, wound care, etc )  - Arrange for interpretive services to assist at discharge as needed  - Refer to Case Management Department for coordinating discharge planning if the patient needs post-hospital services based on physician/advanced practitioner order or complex needs related to functional status, cognitive ability, or social support system  Outcome: Adequate for Discharge

## 2022-10-15 NOTE — PLAN OF CARE
Problem: INFECTION - ADULT  Goal: Absence or prevention of progression during hospitalization  Description: INTERVENTIONS:  - Assess and monitor for signs and symptoms of infection  - Monitor lab/diagnostic results  - Monitor all insertion sites, i e  indwelling lines, tubes, and drains  - Monitor endotracheal if appropriate and nasal secretions for changes in amount and color  - [place] appropriate cooling/warming therapies per order  - Administer medications as ordered  - Instruct and encourage patient and family to use good hand hygiene technique  - Identify and instruct in appropriate isolation precautions for identified infection/condition  Outcome: Progressing     Problem: Knowledge Deficit  Goal: Patient/family/caregiver demonstrates understanding of disease process, treatment plan, medications, and discharge instructions  Description: Complete learning assessment and assess knowledge base    Interventions:  - Provide teaching at level of understanding  - Provide teaching via preferred learning methods  Outcome: Progressing

## 2022-10-15 NOTE — CASE MANAGEMENT
Case Management Discharge Planning Note    Patient name Angelika Hernandez  Location 2 218/CW2 218-01 MRN [id number]  : 1945 Date 10/15/2022       Current Admission Date: 10/10/2022  Current Admission Diagnosis:SOB (shortness of breath)   Patient Active Problem List    Diagnosis Date Noted   • [facility]) 10/10/2022   • Frequent hospital admissions 2022   • Medication management 2022   • Chest pain, musculoskeletal 2022   • Hypothermia 2022   • Epididymitis, bilateral 06/15/2022   • Acute respiratory failure with hypoxia ([address] ) 2022   • Chronic left-sided low back pain without sciatica 2022   • SOB (shortness of breath) 2022   • CKD (chronic kidney disease) stage 3, GFR 30-59 ml/min ([address] ) 2022   • History of prostate cancer 2022   • Adenocarcinoma of lung ([address] ) 2022   • Fracture of navicular bone of left foot 2021   • Chronic respiratory failure with hypoxia ([address] ) 04/15/2021   • PAD (peripheral artery disease) ([address] ) 2021   • DDD (degenerative disc disease), lumbar 2020   • Anemia, iron deficiency 2020   • Lung nodule 2020   • Pulmonary hypertension ([address] ) 2019   • JACQUELINE (obstructive sleep apnea) 2019   • COPD with acute exacerbation ([address] ) 2019   • Oxygen dependent 2018   • Acute metabolic encephalopathy    • RBBB 2018   • Acute on chronic diastolic (congestive) heart failure 2018   • CAD (coronary artery disease) 2018   • Chronic diastolic heart failure ([address] ) 2018   • Pleural effusion on right 10/31/2017   • COPD, severe ([address] ) 2017   • Diabetic neuropathy ([address] ) 2017   • Essential hypertension 2016   • Venous stasis dermatitis of both lower extremities 11/15/2016   • Type 2 diabetes mellitus with hyperglycemia, with long-term current use of insulin ([facility] ) 2016   • COPD exacerbation ([facility] ) 2016   • HLD (hyperlipidemia) 2016 LOS (days): 5  Geometric Mean LOS (GMLOS) (days): 3 10  Days to GMLOS:-2 1     OBJECTIVE:  Risk of Unplanned Readmission Score: 74 88         Current admission status: Inpatient   Preferred Pharmacy:   [address] [place] [address] [place] [address]ie Roth [address]  Phone: [phone] Fax: [phone] [address] [place], [address] [facility] [address] [facility] 38 210 [facility]  Phone: [phone] Fax: [phone]    Primary Care Provider: Suze Freitas MD    Primary Insurance: [facility]  Secondary Insurance:     DISCHARGE DETAILS:    Pt needs a a lyft to go home  CM scheduled lyft entrance A to pt home  Nurse made aware

## 2022-10-15 NOTE — CASE MANAGEMENT
Case Management Discharge Planning Note    Patient name aVmshi Puri  Location 2 218/CW2 218-01 MRN [id number]  : 1945 Date 10/15/2022       Current Admission Date: 10/10/2022  Current Admission Diagnosis:SOB (shortness of breath)   Patient Active Problem List    Diagnosis Date Noted   • [facility]) 10/10/2022   • Frequent hospital admissions 2022   • Medication management 2022   • Chest pain, musculoskeletal 2022   • Hypothermia 2022   • Epididymitis, bilateral 06/15/2022   • Acute respiratory failure with hypoxia ([address] ) 2022   • Chronic left-sided low back pain without sciatica 2022   • SOB (shortness of breath) 2022   • CKD (chronic kidney disease) stage 3, GFR 30-59 ml/min ([address] ) 2022   • History of prostate cancer 2022   • Adenocarcinoma of lung ([address] ) 2022   • Fracture of navicular bone of left foot 2021   • Chronic respiratory failure with hypoxia ([address] ) 04/15/2021   • PAD (peripheral artery disease) ([address] ) 2021   • DDD (degenerative disc disease), lumbar 2020   • Anemia, iron deficiency 2020   • Lung nodule 2020   • Pulmonary hypertension ([address] ) 2019   • JACQUELINE (obstructive sleep apnea) 2019   • COPD with acute exacerbation ([address] ) 2019   • Oxygen dependent 2018   • Acute metabolic encephalopathy    • RBBB 2018   • Acute on chronic diastolic (congestive) heart failure 2018   • CAD (coronary artery disease) 2018   • Chronic diastolic heart failure ([address] ) 2018   • Pleural effusion on right 10/31/2017   • COPD, severe ([address] ) 2017   • Diabetic neuropathy ([address] ) 2017   • Essential hypertension 2016   • Venous stasis dermatitis of both lower extremities 11/15/2016   • Type 2 diabetes mellitus with hyperglycemia, with long-term current use of insulin ([facility] ) 2016   • COPD exacerbation ([facility] ) 2016   • HLD (hyperlipidemia) 2016 LOS (days): 5  Geometric Mean LOS (GMLOS) (days): 3 10  Days to GMLOS:-2     OBJECTIVE:  Risk of Unplanned Readmission Score: 74 11         Current admission status: Inpatient   Preferred Pharmacy:   [address] [place] [address], [address] [place] [address] 74 Dinesh Pierce RACHANA [address] [place] [address]  Phone: [phone] Fax: [phone] [address] [place] [facility] 38 210 [facility]  Phone: [phone] Fax: [phone]    Primary Care Provider: Leigha Ching MD    Primary Insurance: [facility]  Secondary Insurance:     DISCHARGE DETAILS:      [facility] Name[de-identified] Other ([address])    [facility] is unable to accept for PT/Nursing  [facility] is able to accept, [facility] 10/19  DO aware and agreeable to [facility]

## 2022-10-15 NOTE — CONSULTS
Consultation - [facility]    Patient Information: Lopez Leroy  78 y o  male MRN: [id number]  Unit/Bed#: CW2 218-01 Encounter: [id number]  Admitting Physician: Giovanni Rai  PCP: Rian Meraz MD  Date of Consultation:  10/15/22    ASSESSMENT:      PLAN:    1  Type 2 diabetes with hyperglycemia and hypoglycemia  Last A1c was 9 7% recently  Home regimen: Humalog mix 75/25 45u Q8h  Differentials may include exogenous insulin antibody syndrome  Presently, he seems to have postprandial hyperglycemia after breakfast and then hypoglycemia post dose after lunch and dinner  He has been transitioned from IV steroids to prednisone 40mg daily am dose 3 days ago  For now advised to continue Humalog 75/25 50u before breakfast, 40u at lunch 1pm and 30u before dinner at 7pm   Goal capillary glucose should be 100-180mg/dL but upto 200mg/dL may be tolerated  Further titrate to avoid hypoglycemia first and treat hyperglycemia next  Advised follow up in office in 4 weeks  Total time spent was 45 min of which >50% was in coordination of care  Reason for consultation:   diabetes    History of Present Illness:    Gilmer Tillman Sr  is a 78 y o  male with type 2 diabetes for 22 yrs, hypertension, hyperlipidemia, DPN, nephropathy, CAD and COPD who is admitted for COPD exacerbation  He is on long term insulin therapy with uncontrolled glycemia  He wa switched from U500 regimen to Novolin 70/30 tid regimen last admission 8/16/22  Review of Systems:    Review of Systems   Constitutional: Positive for activity change, appetite change and fatigue  HENT: Negative  Eyes: Negative  Respiratory: Negative  Cardiovascular: Negative for chest pain  Gastrointestinal: Negative  Endocrine: Negative  Genitourinary: Negative  Musculoskeletal: Negative  Skin: Negative  Allergic/Immunologic: Negative  Neurological: Negative  Hematological: Negative  Psychiatric/Behavioral: Negative  All other systems reviewed and are negative  Past Medical and Surgical History:     Past Medical History:   Diagnosis Date   • Abdominal pain    • MARANDA (acute kidney injury) ([address] ) 6/19/2018   • Cardiac disease    • CHF (congestive heart failure) (HCC)    • COPD, severe (HCC)    • Coronary artery disease    • DDD (degenerative disc disease), lumbar 9/1/2020   • Diabetes mellitus (HCC)    • Dyspnea    • GERD (gastroesophageal reflux disease)    • Hyperlipidemia    • Hypertension    • MI (myocardial infarction) ([facility] )     with 3 stents   • Nodule of apex of right lung    • JACQUELINE (obstructive sleep apnea)    • Prostate cancer [facility])        Past Surgical History:   Procedure Laterality Date   • ABDOMINAL SURGERY      exploratory   • ANGIOPLASTY      3 stents   • APPENDECTOMY     • COLONOSCOPY  2015   • CT NEEDLE BIOPSY LUNG  11/3/2020   • ESOPHAGOGASTRODUODENOSCOPY N/A 10/2/2017    Procedure: ESOPHAGOGASTRODUODENOSCOPY (EGD); Surgeon: Winnie Sinha MD;  Location: [facility]; Service: Gastroenterology   • IR THORACENTESIS  11/3/2020   • KNEE CARTILAGE SURGERY     • OTHER SURGICAL HISTORY      stent placement   • PROSTATE SURGERY     • SKIN GRAFT      Basal cell CA back       Meds/Allergies:    PTA meds:   Prior to Admission Medications   Prescriptions Last Dose Informant Patient Reported?  Taking?   aspirin (ECOTRIN LOW STRENGTH) 81 mg EC tablet   No No   Sig: Take 1 tablet (81 mg total) by mouth daily   carvedilol (COREG) 6 25 mg tablet   No No   Sig: Take 1 tablet (6 25 mg total) by mouth 2 (two) times a day with meals   cyclobenzaprine (FLEXERIL) 10 mg tablet   No No   Sig: Take 1 tablet (10 mg total) by mouth 2 (two) times a day as needed for muscle spasms   ferrous sulfate 325 (65 Fe) mg tablet   No No   Sig: Take 1 tablet (325 mg total) by mouth daily with breakfast   fluticasone-umeclidinium-vilanterol (Trelegy Ellipta) 100-62 5-25 MCG/INH inhaler   No No   Sig: Inhale 1 puff daily Rinse mouth after use  furosemide (LASIX) 40 mg tablet   No No   Sig: Take 1 tablet (40 mg total) by mouth daily   gabapentin (NEURONTIN) 100 mg capsule   No No   Sig: Take 1 capsule (100 mg total) by mouth 3 (three) times a day   glucose blood test strip   No No   Sig: Use 1 each daily as needed (check sugars) Use as instructed  Uses One Touch Ultra test strip   insulin lispro protamine-insulin lispro (HumaLOG Mix 75/25) 100 units/mL   No No   Sig: Inject 45 Units under the skin 3 (three) times a day   lidocaine (Lidoderm) 5 %   No No   Sig: Apply 1 patch topically daily for 6 days Remove & Discard patch within 12 hours or as directed by MD   losartan (COZAAR) 50 mg tablet   No No   Sig: Take 1 tablet (50 mg total) by mouth daily   potassium chloride (K-DUR,KLOR-CON) 20 mEq tablet   No No   Sig: Take 1 tablet (20 mEq total) by mouth daily   simvastatin (ZOCOR) 40 mg tablet   No No   Sig: Take 1 tablet (40 mg total) by mouth daily   tamsulosin (FLOMAX) 0 4 mg   No No   Sig: Take 1 capsule (0 4 mg total) by mouth daily with dinner      Facility-Administered Medications: None       Allergies: Allergies   Allergen Reactions   • Crestor [Rosuvastatin] Other (See Comments)     Unknown     • Lisinopril GI Intolerance, Dizziness and Abdominal Pain   • Metformin GI Intolerance     History:     Marital Status:     Occupation:   Substance Use History:   Social History     Substance and Sexual Activity   Alcohol Use Never     Social History     Tobacco Use   Smoking Status Former Smoker   • Packs/day: 1 50   • Years: 50 00   • Pack years: 75 00   • Start date: 38   • Quit date: 2020   • Years since quittin 2   Smokeless Tobacco Never Used     Social History     Substance and Sexual Activity   Drug Use No       Family History:    Family History   Problem Relation Age of Onset   • Heart disease Father    • Other Father         Mesothelioma        Physical Exam:     Vitals:   Blood Pressure: 123/58 (10/14/22 2312)  Pulse: 71 (10/14/22 2312)  Temperature: 98 2 °F (36 8 °C) (10/14/22 2312)  Temp Source: Oral (10/14/22 1531)  Respirations: 18 (10/14/22 0751)  Weight - Scale: 96 9 kg (213 lb 9 6 oz) (10/14/22 0645)  SpO2: 99 % (10/14/22 2312)    Physical Exam  Constitutional:       Appearance: He is well-developed  HENT:      Head: Normocephalic  Eyes:      Pupils: Pupils are equal, round, and reactive to light  Neck:      Thyroid: No thyromegaly  Cardiovascular:      Rate and Rhythm: Normal rate  Heart sounds: Normal heart sounds  Pulmonary:      Effort: Pulmonary effort is normal       Breath sounds: Normal breath sounds  Abdominal:      General: Bowel sounds are normal       Palpations: Abdomen is soft  Musculoskeletal:         General: No deformity  Cervical back: Normal range of motion  Skin:     General: Skin is dry  Capillary Refill: Capillary refill takes less than 2 seconds  Coloration: Skin is not pale  Findings: No rash  Neurological:      Mental Status: He is alert and oriented to person, place, and time  Lab and Imaging Results: I have personally reviewed pertinent films in PACS    Results from last 7 days   Lab Units 10/11/22  0507 10/10/22  0929   WBC Thousand/uL 6 92 13 31*   HEMOGLOBIN g/dL 10 2* 12 2   HEMATOCRIT % 30 8* 35 4*   PLATELETS Thousands/uL 103* 112*   NEUTROS PCT %  --  85*   LYMPHS PCT %  --  6*   MONOS PCT %  --  7   EOS PCT %  --  1     Results from last 7 days   Lab Units 10/14/22  0637 10/11/22  0507 10/10/22  0929   POTASSIUM mmol/L 4 5   < > 4 4   CHLORIDE mmol/L 108   < > 105   CO2 mmol/L 25   < > 22   BUN mg/dL 72*   < > 41*   CREATININE mg/dL 2 71*   < > 2 27*   CALCIUM mg/dL 8 1*   < > 8 5   ALK PHOS U/L  --   --  87   ALT U/L  --   --  15   AST U/L  --   --  8    < > = values in this interval not displayed           POC Glucose (mg/dl)   Date Value   10/15/2022 215 (H)   10/14/2022 182 (H)   10/14/2022 104   10/14/2022 368 (H)   10/14/2022 130   10/14/2022 43 (L)   10/14/2022 49 (L)   10/13/2022 192 (H)   10/13/2022 167 (H)   10/13/2022 86         ** Please Note: Dragon 360 Dictation voice to text software may have been used in the creation of this document   **

## 2022-10-15 NOTE — PROGRESS NOTES
NEPHROLOGY PROGRESS NOTE   Jailyn Cavazos  78 y o  male MRN: [id number]  Unit/Bed#: CW2 218-01 Encounter: [id number]      ASSESSMENT & PLAN:  1  Acute kidney injury on top of CKD stage 3 with previous creatinine around 1 4-1 7 though lately seems to be in the low 2s  Admitted with a creatinine of 2 27, creatinine peak at 2 84 on 10/12  ARB and diuretics on hold  Urinalysis on 10/12 bland: no rbc's, no wbc's, no protein  Avoid relative hypotension  Previously ARB and diuretics were held  Kidney function improving with creatinine down to 2 66 today  Home diuretics were resumed yesterday  Avoid nephrotoxins, follow low-salt diet  He will need outpatient follow-up, last time he was seen in our office was in 2018  Stable from kidney standpoint of view to be discharged as long as okay with primary team   Will arrange for outpatient follow-up after discharge    2  Chronic kidney disease stage IIIB with previous creatinine in the high 1s though lately in the low 2s    3  Shortness of breath, suspected COPD exacerbation, continue medical management per primary team     4  Chronic diastolic congestive heart failure, atrial fibrillation  Kidney function improving, home diuretics were resumed yesterday  Once again discussed with patient about importance of following a low-salt diet less than 2 g salt in a day    5  Mild anemia, hemoglobin low but stable, last hemoglobin 10 3 on 10/15    6  Hypertension, blood pressure stable, keep holding ARB in the setting of acute kidney injury, that can be resumed as an outpatient        SUBJECTIVE:  Patient seen and examined, reports shortness of breath improving  Denies any chest pain, no nausea, no vomiting, no abdominal pain    Would like to go home    OBJECTIVE:  Current Weight: Weight - Scale: 98 7 kg (217 lb 11 1 oz)  Vitals:    10/15/22 0805   BP: 129/59   Pulse: 66   Resp: 20   Temp: 97 5 °F (36 4 °C)   SpO2: 93%       Intake/Output Summary (Last 24 hours) at 10/15/2022 1915  Last data filed at 10/15/2022 0600  Gross per 24 hour   Intake 1650 ml   Output 4050 ml   Net -2400 ml       General: conscious, cooperative, in not acute distress  Eyes: conjunctivae pale, anicteric sclerae  ENT: lips and mucous membranes moist  Neck: supple, no JVD  Chest:  Diffuse rales and rhonchi  CVS: distinct S1 & S2, normal rate, regular rhythm  Abdomen: soft, non-tender, non-distended, normoactive bowel sounds  Extremities:  Positive ankle edema of both legs  Skin: no rash  Neuro: awake, alert, oriented          Medications:    Current Facility-Administered Medications:   •  acetaminophen (TYLENOL) tablet 650 mg, 650 mg, Oral, Q6H PRN, La Pepper, DO, 650 mg at 10/15/22 9595  •  apixaban (ELIQUIS) tablet 5 mg, 5 mg, Oral, BID, YOLANDE Fiore, 5 mg at 10/14/22 1804  •  aspirin (ECOTRIN LOW STRENGTH) EC tablet 81 mg, 81 mg, Oral, Daily, Precious Hubbard DO, 81 mg at 10/14/22 0514  •  carvedilol (COREG) tablet 6 25 mg, 6 25 mg, Oral, BID With Meals, Abhinav Velasco, DO, 6 25 mg at 10/14/22 1804  •  cyclobenzaprine (FLEXERIL) tablet 10 mg, 10 mg, Oral, BID PRN, Abhinav Velasco, DO, 10 mg at 10/14/22 1311  •  ferrous sulfate tablet 325 mg, 325 mg, Oral, Daily With Breakfast, Abhinav Velasco DO, 325 mg at 10/14/22 0859  •  fluticasone-vilanterol (BREO ELLIPTA) 100-25 mcg/inh inhaler 1 puff, 1 puff, Inhalation, Daily, Adam Hubbard DO, 1 puff at 10/14/22 1303  •  furosemide (LASIX) tablet 40 mg, 40 mg, Oral, Daily, YOLANDE Bernabe, 40 mg at 10/14/22 1305  •  gabapentin (NEURONTIN) capsule 100 mg, 100 mg, Oral, TID, Adam Hubbard DO, 100 mg at 10/14/22 2036  •  insulin aspart protamine-insulin aspart (NovoLOG 70/30) 100 units/mL subcutaneous injection 30 Units, 30 Units, Subcutaneous, Daily With Lindsay Gosselin, MD, 30 Units at 10/14/22 1805  •  insulin aspart protamine-insulin aspart (NovoLOG 70/30) 100 units/mL subcutaneous injection 40 Units, 40 Units, Subcutaneous, Daily With Lunch, Lolita Savage MD  •  insulin aspart protamine-insulin aspart (NovoLOG 70/30) 100 units/mL subcutaneous injection 50 Units, 50 Units, Subcutaneous, Daily With Breakfast, Lolita Savage MD  •  insulin lispro (HumaLOG) 100 units/mL subcutaneous injection 2-12 Units, 2-12 Units, Subcutaneous, 4x Daily (AC & HS), 4 Units at 10/15/22 0645 **AND** Fingerstick Glucose (POCT), , , 4x Daily AC and at bedtime, Jing Gibbons PA-C  •  ipratropium (ATROVENT) 0 02 % inhalation solution 0 5 mg, 0 5 mg, Nebulization, TID, Denisa Hubbard DO, 0 5 mg at 10/15/22 0478  •  levalbuterol (XOPENEX) inhalation solution 1 25 mg, 1 25 mg, Nebulization, TID, Denisa Hubbard DO, 1 25 mg at 10/15/22 0749  •  melatonin tablet 3 mg, 3 mg, Oral, HS PRN, Jing Gibbons PA-C, 3 mg at 10/14/22 2036  •  pantoprazole (PROTONIX) EC tablet 40 mg, 40 mg, Oral, Early Morning, Sangeetha YOLANDE Thomas, 40 mg at 10/15/22 6761  •  pravastatin (PRAVACHOL) tablet 40 mg, 40 mg, Oral, After Nedra Lacer, DO, 40 mg at 10/14/22 1804  •  predniSONE tablet 40 mg, 40 mg, Oral, Daily, Sangeetha PAULA ThomasNP, 40 mg at 10/14/22 7162  •  tamsulosin (FLOMAX) capsule 0 4 mg, 0 4 mg, Oral, After Dinner, Karen Money, DO, 0 4 mg at 10/14/22 1804  •  umeclidinium bromide (INCRUSE ELLIPTA) 62 5 mcg/inh inhaler AEPB 1 puff, 1 puff, Inhalation, Daily, Karen Money, DO, 1 puff at 10/14/22 1303    Invasive Devices:        Lab Results:   Results from last 7 days   Lab Units 10/15/22  0611 10/14/22  0637 10/13/22  0646 10/12/22  0910 10/11/22  0507 10/10/22  0929   WBC Thousand/uL 6 23  --   --   --  6 92 13 31*   HEMOGLOBIN g/dL 10 3*  --   --   --  10 2* 12 2   HEMATOCRIT % 32 3*  --   --   --  30 8* 35 4*   PLATELETS Thousands/uL 122*  --   --   --  103* 112*   SODIUM mmol/L 139 139 137   < > 135 135   POTASSIUM mmol/L 4 8 4 5 4 5   < > 4 6 4 4   CHLORIDE mmol/L 108 108 107   < > 104 105   CO2 mmol/L 25 25 25   < > 24 22   BUN mg/dL 72* 72* 74*   < > 50* 41*   CREATININE mg/dL 2 66* 2 71* 2 81*   < > 2 51* 2 27*   CALCIUM mg/dL 8 2* 8 1* 8 3   < > 8 3 8 5   PHOSPHORUS mg/dL  --   --  4 9*  --   --   --    ALK PHOS U/L  --   --   --   --   --  87   ALT U/L  --   --   --   --   --  15   AST U/L  --   --   --   --   --  8    < > = values in this interval not displayed  Portions of the record may have been created with voice recognition software  Occasional wrong word or "sound a like" substitutions may have occurred due to the inherent limitations of voice recognition software  Read the chart carefully and recognize, using context, where substitutions have occurred  If you have any questions, please contact the dictating provider

## 2022-10-16 ENCOUNTER — APPOINTMENT (EMERGENCY)
Dept: RADIOLOGY | Facility: HOSPITAL | Age: 77
DRG: 189 | End: 2022-10-16
Payer: COMMERCIAL

## 2022-10-16 ENCOUNTER — HOSPITAL ENCOUNTER (INPATIENT)
Facility: HOSPITAL | Age: 77
LOS: 8 days | Discharge: HOME HEALTH | DRG: 189 | End: 2022-10-24
Attending: EMERGENCY MEDICINE | Admitting: INTERNAL MEDICINE
Payer: COMMERCIAL

## 2022-10-16 DIAGNOSIS — D62 ABLA (ACUTE BLOOD LOSS ANEMIA): ICD-10-CM

## 2022-10-16 DIAGNOSIS — I25.10 CORONARY ARTERY DISEASE INVOLVING NATIVE CORONARY ARTERY OF NATIVE HEART WITHOUT ANGINA PECTORIS: ICD-10-CM

## 2022-10-16 DIAGNOSIS — E87.5 HYPERKALEMIA: ICD-10-CM

## 2022-10-16 DIAGNOSIS — E11.649 TYPE 2 DIABETES MELLITUS WITH HYPOGLYCEMIA WITHOUT COMA, WITH LONG-TERM CURRENT USE OF INSULIN (HCC): ICD-10-CM

## 2022-10-16 DIAGNOSIS — I50.33 ACUTE ON CHRONIC DIASTOLIC (CONGESTIVE) HEART FAILURE (HCC): ICD-10-CM

## 2022-10-16 DIAGNOSIS — J96.02 ACUTE ON CHRONIC RESPIRATORY ACIDOSIS (HCC): Primary | ICD-10-CM

## 2022-10-16 DIAGNOSIS — J44.1 COPD EXACERBATION (HCC): ICD-10-CM

## 2022-10-16 DIAGNOSIS — J44.9 COPD (CHRONIC OBSTRUCTIVE PULMONARY DISEASE) (HCC): ICD-10-CM

## 2022-10-16 DIAGNOSIS — J96.12 CHRONIC RESPIRATORY FAILURE WITH HYPOXIA AND HYPERCAPNIA (HCC): Chronic | ICD-10-CM

## 2022-10-16 DIAGNOSIS — N18.31 STAGE 3A CHRONIC KIDNEY DISEASE (HCC): ICD-10-CM

## 2022-10-16 DIAGNOSIS — Z79.4 TYPE 2 DIABETES MELLITUS WITH HYPOGLYCEMIA WITHOUT COMA, WITH LONG-TERM CURRENT USE OF INSULIN (HCC): ICD-10-CM

## 2022-10-16 DIAGNOSIS — J96.11 CHRONIC RESPIRATORY FAILURE WITH HYPOXIA AND HYPERCAPNIA (HCC): Chronic | ICD-10-CM

## 2022-10-16 DIAGNOSIS — N30.00 ACUTE CYSTITIS WITHOUT HEMATURIA: ICD-10-CM

## 2022-10-16 DIAGNOSIS — R07.89 CHEST WALL PAIN: ICD-10-CM

## 2022-10-16 DIAGNOSIS — N17.9 AKI (ACUTE KIDNEY INJURY) (HCC): ICD-10-CM

## 2022-10-16 DIAGNOSIS — R07.9 CHEST PAIN: ICD-10-CM

## 2022-10-16 DIAGNOSIS — E78.00 HYPERCHOLESTEROLEMIA: ICD-10-CM

## 2022-10-16 DIAGNOSIS — I50.32 CHRONIC DIASTOLIC (CONGESTIVE) HEART FAILURE (HCC): ICD-10-CM

## 2022-10-16 DIAGNOSIS — E11.65 TYPE 2 DIABETES MELLITUS WITH HYPERGLYCEMIA, WITH LONG-TERM CURRENT USE OF INSULIN (HCC): ICD-10-CM

## 2022-10-16 DIAGNOSIS — J96.12 ACUTE ON CHRONIC RESPIRATORY ACIDOSIS (HCC): Primary | ICD-10-CM

## 2022-10-16 DIAGNOSIS — R04.0 EPISTAXIS: ICD-10-CM

## 2022-10-16 DIAGNOSIS — D64.9 ANEMIA: ICD-10-CM

## 2022-10-16 DIAGNOSIS — I10 ESSENTIAL HYPERTENSION: ICD-10-CM

## 2022-10-16 DIAGNOSIS — R09.02 HYPOXIA: ICD-10-CM

## 2022-10-16 DIAGNOSIS — J44.1 COPD WITH ACUTE EXACERBATION (HCC): ICD-10-CM

## 2022-10-16 DIAGNOSIS — R73.9 HYPERGLYCEMIA: ICD-10-CM

## 2022-10-16 DIAGNOSIS — Z79.4 TYPE 2 DIABETES MELLITUS WITH HYPERGLYCEMIA, WITH LONG-TERM CURRENT USE OF INSULIN (HCC): ICD-10-CM

## 2022-10-16 DIAGNOSIS — I10 HIGH BLOOD PRESSURE: ICD-10-CM

## 2022-10-16 PROBLEM — N18.32 STAGE 3B CHRONIC KIDNEY DISEASE (CKD) (HCC): Status: ACTIVE | Noted: 2022-01-29

## 2022-10-16 PROBLEM — J96.21 ACUTE ON CHRONIC RESPIRATORY FAILURE WITH HYPOXIA (HCC): Status: ACTIVE | Noted: 2021-04-15

## 2022-10-16 LAB
2HR DELTA HS TROPONIN: -2 NG/L
ALBUMIN SERPL BCP-MCNC: 3 G/DL (ref 3.5–5)
ALP SERPL-CCNC: 67 U/L (ref 46–116)
ALT SERPL W P-5'-P-CCNC: 39 U/L (ref 12–78)
ANION GAP SERPL CALCULATED.3IONS-SCNC: 3 MMOL/L (ref 4–13)
AST SERPL W P-5'-P-CCNC: 25 U/L (ref 5–45)
BASE EX.OXY STD BLDV CALC-SCNC: 78.7 % (ref 60–80)
BASE EXCESS BLDV CALC-SCNC: 0.1 MMOL/L
BASOPHILS # BLD MANUAL: 0 THOUSAND/UL (ref 0–0.1)
BASOPHILS NFR MAR MANUAL: 0 % (ref 0–1)
BILIRUB SERPL-MCNC: 0.37 MG/DL (ref 0.2–1)
BUN SERPL-MCNC: 90 MG/DL (ref 5–25)
CALCIUM ALBUM COR SERPL-MCNC: 9 MG/DL (ref 8.3–10.1)
CALCIUM SERPL-MCNC: 8.2 MG/DL (ref 8.3–10.1)
CARDIAC TROPONIN I PNL SERPL HS: 38 NG/L
CARDIAC TROPONIN I PNL SERPL HS: 40 NG/L
CHLORIDE SERPL-SCNC: 104 MMOL/L (ref 96–108)
CO2 SERPL-SCNC: 28 MMOL/L (ref 21–32)
CREAT SERPL-MCNC: 2.93 MG/DL (ref 0.6–1.3)
EOSINOPHIL # BLD MANUAL: 0.71 THOUSAND/UL (ref 0–0.4)
EOSINOPHIL NFR BLD MANUAL: 5 % (ref 0–6)
ERYTHROCYTE [DISTWIDTH] IN BLOOD BY AUTOMATED COUNT: 15.2 % (ref 11.6–15.1)
GFR SERPL CREATININE-BSD FRML MDRD: 19 ML/MIN/1.73SQ M
GLUCOSE SERPL-MCNC: 344 MG/DL (ref 65–140)
HCO3 BLDV-SCNC: 28.8 MMOL/L (ref 24–30)
HCT VFR BLD AUTO: 32.3 % (ref 36.5–49.3)
HGB BLD-MCNC: 10.2 G/DL (ref 12–17)
LYMPHOCYTES # BLD AUTO: 17 % (ref 14–44)
LYMPHOCYTES # BLD AUTO: 2.4 THOUSAND/UL (ref 0.6–4.47)
MCH RBC QN AUTO: 27 PG (ref 26.8–34.3)
MCHC RBC AUTO-ENTMCNC: 31.6 G/DL (ref 31.4–37.4)
MCV RBC AUTO: 85 FL (ref 82–98)
MONOCYTES # BLD AUTO: 0.85 THOUSAND/UL (ref 0–1.22)
MONOCYTES NFR BLD: 6 % (ref 4–12)
NEUTROPHILS # BLD MANUAL: 10.17 THOUSAND/UL (ref 1.85–7.62)
NEUTS BAND NFR BLD MANUAL: 1 % (ref 0–8)
NEUTS SEG NFR BLD AUTO: 71 % (ref 43–75)
O2 CT BLDV-SCNC: 12.7 ML/DL
OVALOCYTES BLD QL SMEAR: PRESENT
PCO2 BLDV: 69.5 MM HG (ref 42–50)
PH BLDV: 7.24 [PH] (ref 7.3–7.4)
PLATELET # BLD AUTO: 183 THOUSANDS/UL (ref 149–390)
PLATELET BLD QL SMEAR: ADEQUATE
PMV BLD AUTO: 11.3 FL (ref 8.9–12.7)
PO2 BLDV: 52.2 MM HG (ref 35–45)
POIKILOCYTOSIS BLD QL SMEAR: PRESENT
POTASSIUM SERPL-SCNC: 5.9 MMOL/L (ref 3.5–5.3)
PROT SERPL-MCNC: 7.5 G/DL (ref 6.4–8.4)
RBC # BLD AUTO: 3.78 MILLION/UL (ref 3.88–5.62)
RBC MORPH BLD: PRESENT
SARS-COV-2 RNA RESP QL NAA+PROBE: NEGATIVE
SODIUM SERPL-SCNC: 135 MMOL/L (ref 135–147)
WBC # BLD AUTO: 14.13 THOUSAND/UL (ref 4.31–10.16)

## 2022-10-16 PROCEDURE — 82805 BLOOD GASES W/O2 SATURATION: CPT

## 2022-10-16 PROCEDURE — 85007 BL SMEAR W/DIFF WBC COUNT: CPT

## 2022-10-16 PROCEDURE — 80053 COMPREHEN METABOLIC PANEL: CPT

## 2022-10-16 PROCEDURE — 99223 1ST HOSP IP/OBS HIGH 75: CPT | Performed by: INTERNAL MEDICINE

## 2022-10-16 PROCEDURE — 99285 EMERGENCY DEPT VISIT HI MDM: CPT

## 2022-10-16 PROCEDURE — 84484 ASSAY OF TROPONIN QUANT: CPT

## 2022-10-16 PROCEDURE — 93005 ELECTROCARDIOGRAM TRACING: CPT

## 2022-10-16 PROCEDURE — 71045 X-RAY EXAM CHEST 1 VIEW: CPT

## 2022-10-16 PROCEDURE — 85027 COMPLETE CBC AUTOMATED: CPT

## 2022-10-16 PROCEDURE — U0003 INFECTIOUS AGENT DETECTION BY NUCLEIC ACID (DNA OR RNA); SEVERE ACUTE RESPIRATORY SYNDROME CORONAVIRUS 2 (SARS-COV-2) (CORONAVIRUS DISEASE [COVID-19]), AMPLIFIED PROBE TECHNIQUE, MAKING USE OF HIGH THROUGHPUT TECHNOLOGIES AS DESCRIBED BY CMS-2020-01-R: HCPCS

## 2022-10-16 PROCEDURE — U0005 INFEC AGEN DETEC AMPLI PROBE: HCPCS

## 2022-10-16 PROCEDURE — 36415 COLL VENOUS BLD VENIPUNCTURE: CPT

## 2022-10-16 PROCEDURE — 94640 AIRWAY INHALATION TREATMENT: CPT

## 2022-10-16 RX ORDER — INSULIN LISPRO 100 [IU]/ML
1-5 INJECTION, SOLUTION INTRAVENOUS; SUBCUTANEOUS
Status: DISCONTINUED | OUTPATIENT
Start: 2022-10-16 | End: 2022-10-23

## 2022-10-16 RX ORDER — PANTOPRAZOLE SODIUM 40 MG/1
40 TABLET, DELAYED RELEASE ORAL
Status: DISCONTINUED | OUTPATIENT
Start: 2022-10-17 | End: 2022-10-21

## 2022-10-16 RX ORDER — IPRATROPIUM BROMIDE AND ALBUTEROL SULFATE .5; 3 MG/3ML; MG/3ML
1 SOLUTION RESPIRATORY (INHALATION) ONCE
Status: COMPLETED | OUTPATIENT
Start: 2022-10-16 | End: 2022-10-16

## 2022-10-16 RX ORDER — FERROUS SULFATE 325(65) MG
325 TABLET ORAL
Status: DISCONTINUED | OUTPATIENT
Start: 2022-10-17 | End: 2022-10-24 | Stop reason: HOSPADM

## 2022-10-16 RX ORDER — GUAIFENESIN 600 MG/1
600 TABLET, EXTENDED RELEASE ORAL 2 TIMES DAILY
Status: DISCONTINUED | OUTPATIENT
Start: 2022-10-16 | End: 2022-10-24 | Stop reason: HOSPADM

## 2022-10-16 RX ORDER — INSULIN ASPART 100 [IU]/ML
40 INJECTION, SUSPENSION SUBCUTANEOUS
Status: DISCONTINUED | OUTPATIENT
Start: 2022-10-17 | End: 2022-10-19

## 2022-10-16 RX ORDER — PRAVASTATIN SODIUM 80 MG/1
80 TABLET ORAL
Status: DISCONTINUED | OUTPATIENT
Start: 2022-10-17 | End: 2022-10-24 | Stop reason: HOSPADM

## 2022-10-16 RX ORDER — ALBUTEROL SULFATE 2.5 MG/3ML
1 SOLUTION RESPIRATORY (INHALATION) ONCE
Status: COMPLETED | OUTPATIENT
Start: 2022-10-16 | End: 2022-10-16

## 2022-10-16 RX ORDER — TAMSULOSIN HYDROCHLORIDE 0.4 MG/1
0.4 CAPSULE ORAL
Status: DISCONTINUED | OUTPATIENT
Start: 2022-10-17 | End: 2022-10-24 | Stop reason: HOSPADM

## 2022-10-16 RX ORDER — FUROSEMIDE 40 MG/1
40 TABLET ORAL DAILY
Status: DISCONTINUED | OUTPATIENT
Start: 2022-10-17 | End: 2022-10-20

## 2022-10-16 RX ORDER — PREDNISONE 20 MG/1
40 TABLET ORAL DAILY
Status: DISCONTINUED | OUTPATIENT
Start: 2022-10-16 | End: 2022-10-17

## 2022-10-16 RX ORDER — CYCLOBENZAPRINE HCL 10 MG
10 TABLET ORAL 2 TIMES DAILY PRN
Status: DISCONTINUED | OUTPATIENT
Start: 2022-10-16 | End: 2022-10-24 | Stop reason: HOSPADM

## 2022-10-16 RX ORDER — SODIUM CHLORIDE FOR INHALATION 0.9 %
3 VIAL, NEBULIZER (ML) INHALATION EVERY 6 HOURS PRN
Status: DISCONTINUED | OUTPATIENT
Start: 2022-10-16 | End: 2022-10-17

## 2022-10-16 RX ORDER — SODIUM CHLORIDE FOR INHALATION 0.9 %
3 VIAL, NEBULIZER (ML) INHALATION ONCE
Status: COMPLETED | OUTPATIENT
Start: 2022-10-16 | End: 2022-10-16

## 2022-10-16 RX ORDER — HEPARIN SODIUM 5000 [USP'U]/ML
5000 INJECTION, SOLUTION INTRAVENOUS; SUBCUTANEOUS EVERY 8 HOURS SCHEDULED
Status: DISCONTINUED | OUTPATIENT
Start: 2022-10-16 | End: 2022-10-16

## 2022-10-16 RX ORDER — CARVEDILOL 6.25 MG/1
6.25 TABLET ORAL 2 TIMES DAILY WITH MEALS
Status: DISCONTINUED | OUTPATIENT
Start: 2022-10-17 | End: 2022-10-18

## 2022-10-16 RX ORDER — INSULIN ASPART 100 [IU]/ML
30 INJECTION, SUSPENSION SUBCUTANEOUS
Status: DISCONTINUED | OUTPATIENT
Start: 2022-10-17 | End: 2022-10-22

## 2022-10-16 RX ORDER — POTASSIUM CHLORIDE 20 MEQ/1
20 TABLET, EXTENDED RELEASE ORAL DAILY
Status: DISCONTINUED | OUTPATIENT
Start: 2022-10-17 | End: 2022-10-17

## 2022-10-16 RX ORDER — ONDANSETRON 2 MG/ML
4 INJECTION INTRAMUSCULAR; INTRAVENOUS EVERY 6 HOURS PRN
Status: DISCONTINUED | OUTPATIENT
Start: 2022-10-16 | End: 2022-10-17

## 2022-10-16 RX ORDER — GABAPENTIN 100 MG/1
100 CAPSULE ORAL 3 TIMES DAILY
Status: DISCONTINUED | OUTPATIENT
Start: 2022-10-16 | End: 2022-10-24 | Stop reason: HOSPADM

## 2022-10-16 RX ORDER — DOCUSATE SODIUM 100 MG/1
100 CAPSULE, LIQUID FILLED ORAL 2 TIMES DAILY PRN
Status: DISCONTINUED | OUTPATIENT
Start: 2022-10-16 | End: 2022-10-17

## 2022-10-16 RX ORDER — SODIUM CHLORIDE FOR INHALATION 0.9 %
3 VIAL, NEBULIZER (ML) INHALATION
Status: DISCONTINUED | OUTPATIENT
Start: 2022-10-16 | End: 2022-10-23

## 2022-10-16 RX ORDER — INSULIN ASPART 100 [IU]/ML
50 INJECTION, SUSPENSION SUBCUTANEOUS
Status: DISCONTINUED | OUTPATIENT
Start: 2022-10-17 | End: 2022-10-21

## 2022-10-16 RX ORDER — INSULIN LISPRO 100 [IU]/ML
1-6 INJECTION, SOLUTION INTRAVENOUS; SUBCUTANEOUS
Status: DISCONTINUED | OUTPATIENT
Start: 2022-10-17 | End: 2022-10-24 | Stop reason: HOSPADM

## 2022-10-16 RX ORDER — MAGNESIUM HYDROXIDE/ALUMINUM HYDROXICE/SIMETHICONE 120; 1200; 1200 MG/30ML; MG/30ML; MG/30ML
30 SUSPENSION ORAL EVERY 6 HOURS PRN
Status: DISCONTINUED | OUTPATIENT
Start: 2022-10-16 | End: 2022-10-17

## 2022-10-16 RX ORDER — LIDOCAINE 50 MG/G
1 PATCH TOPICAL DAILY
Status: DISCONTINUED | OUTPATIENT
Start: 2022-10-17 | End: 2022-10-24 | Stop reason: HOSPADM

## 2022-10-16 RX ORDER — ASPIRIN 81 MG/1
81 TABLET ORAL DAILY
Status: DISCONTINUED | OUTPATIENT
Start: 2022-10-17 | End: 2022-10-24 | Stop reason: HOSPADM

## 2022-10-16 RX ORDER — LEVALBUTEROL 1.25 MG/.5ML
1.25 SOLUTION, CONCENTRATE RESPIRATORY (INHALATION) EVERY 6 HOURS PRN
Status: DISCONTINUED | OUTPATIENT
Start: 2022-10-16 | End: 2022-10-17

## 2022-10-16 RX ORDER — LEVALBUTEROL 1.25 MG/.5ML
1.25 SOLUTION, CONCENTRATE RESPIRATORY (INHALATION)
Status: DISCONTINUED | OUTPATIENT
Start: 2022-10-16 | End: 2022-10-23

## 2022-10-16 RX ORDER — INSULIN ASPART 100 [IU]/ML
50 INJECTION, SUSPENSION SUBCUTANEOUS
Status: DISCONTINUED | OUTPATIENT
Start: 2022-10-17 | End: 2022-10-17

## 2022-10-16 RX ORDER — ACETAMINOPHEN 325 MG/1
650 TABLET ORAL EVERY 6 HOURS PRN
Status: DISCONTINUED | OUTPATIENT
Start: 2022-10-16 | End: 2022-10-24 | Stop reason: HOSPADM

## 2022-10-16 RX ADMIN — ISODIUM CHLORIDE 3 ML: 0.03 SOLUTION RESPIRATORY (INHALATION) at 20:55

## 2022-10-16 RX ADMIN — IPRATROPIUM BROMIDE 1 MG: 0.5 SOLUTION RESPIRATORY (INHALATION) at 20:55

## 2022-10-16 RX ADMIN — ALBUTEROL SULFATE 10 MG: 2.5 SOLUTION RESPIRATORY (INHALATION) at 20:55

## 2022-10-17 ENCOUNTER — TELEPHONE (OUTPATIENT)
Dept: NEPHROLOGY | Facility: CLINIC | Age: 77
End: 2022-10-17

## 2022-10-17 ENCOUNTER — TRANSITIONAL CARE MANAGEMENT (OUTPATIENT)
Dept: FAMILY MEDICINE CLINIC | Facility: CLINIC | Age: 77
End: 2022-10-17

## 2022-10-17 DIAGNOSIS — N17.9 AKI (ACUTE KIDNEY INJURY) (HCC): ICD-10-CM

## 2022-10-17 DIAGNOSIS — N18.32 STAGE 3B CHRONIC KIDNEY DISEASE (CKD) (HCC): Primary | ICD-10-CM

## 2022-10-17 PROBLEM — J96.22 ACUTE ON CHRONIC RESPIRATORY FAILURE WITH HYPOXIA AND HYPERCAPNIA (HCC): Status: ACTIVE | Noted: 2021-04-15

## 2022-10-17 PROBLEM — E87.5 HYPERKALEMIA: Status: ACTIVE | Noted: 2022-10-17

## 2022-10-17 LAB
4HR DELTA HS TROPONIN: 0 NG/L
ALBUMIN SERPL BCP-MCNC: 2.9 G/DL (ref 3.5–5)
ALP SERPL-CCNC: 62 U/L (ref 46–116)
ALT SERPL W P-5'-P-CCNC: 30 U/L (ref 12–78)
ANION GAP SERPL CALCULATED.3IONS-SCNC: 4 MMOL/L (ref 4–13)
ANION GAP SERPL CALCULATED.3IONS-SCNC: 7 MMOL/L (ref 4–13)
ANION GAP SERPL CALCULATED.3IONS-SCNC: 7 MMOL/L (ref 4–13)
AST SERPL W P-5'-P-CCNC: 10 U/L (ref 5–45)
ATRIAL RATE: 110 BPM
BASOPHILS # BLD AUTO: 0.04 THOUSANDS/ÂΜL (ref 0–0.1)
BASOPHILS NFR BLD AUTO: 0 % (ref 0–1)
BILIRUB SERPL-MCNC: 0.27 MG/DL (ref 0.2–1)
BUN SERPL-MCNC: 78 MG/DL (ref 5–25)
BUN SERPL-MCNC: 82 MG/DL (ref 5–25)
BUN SERPL-MCNC: 90 MG/DL (ref 5–25)
CALCIUM ALBUM COR SERPL-MCNC: 9.4 MG/DL (ref 8.3–10.1)
CALCIUM SERPL-MCNC: 8.3 MG/DL (ref 8.3–10.1)
CALCIUM SERPL-MCNC: 8.5 MG/DL (ref 8.3–10.1)
CALCIUM SERPL-MCNC: 8.6 MG/DL (ref 8.3–10.1)
CARDIAC TROPONIN I PNL SERPL HS: 40 NG/L
CHLORIDE SERPL-SCNC: 103 MMOL/L (ref 96–108)
CHLORIDE SERPL-SCNC: 104 MMOL/L (ref 96–108)
CHLORIDE SERPL-SCNC: 105 MMOL/L (ref 96–108)
CO2 SERPL-SCNC: 25 MMOL/L (ref 21–32)
CO2 SERPL-SCNC: 25 MMOL/L (ref 21–32)
CO2 SERPL-SCNC: 27 MMOL/L (ref 21–32)
CREAT SERPL-MCNC: 2.61 MG/DL (ref 0.6–1.3)
CREAT SERPL-MCNC: 2.78 MG/DL (ref 0.6–1.3)
CREAT SERPL-MCNC: 2.82 MG/DL (ref 0.6–1.3)
EOSINOPHIL # BLD AUTO: 0 THOUSAND/ÂΜL (ref 0–0.61)
EOSINOPHIL NFR BLD AUTO: 0 % (ref 0–6)
ERYTHROCYTE [DISTWIDTH] IN BLOOD BY AUTOMATED COUNT: 15.3 % (ref 11.6–15.1)
GFR SERPL CREATININE-BSD FRML MDRD: 20 ML/MIN/1.73SQ M
GFR SERPL CREATININE-BSD FRML MDRD: 21 ML/MIN/1.73SQ M
GFR SERPL CREATININE-BSD FRML MDRD: 22 ML/MIN/1.73SQ M
GLUCOSE SERPL-MCNC: 179 MG/DL (ref 65–140)
GLUCOSE SERPL-MCNC: 183 MG/DL (ref 65–140)
GLUCOSE SERPL-MCNC: 237 MG/DL (ref 65–140)
GLUCOSE SERPL-MCNC: 240 MG/DL (ref 65–140)
GLUCOSE SERPL-MCNC: 264 MG/DL (ref 65–140)
GLUCOSE SERPL-MCNC: 270 MG/DL (ref 65–140)
GLUCOSE SERPL-MCNC: 386 MG/DL (ref 65–140)
GLUCOSE SERPL-MCNC: 392 MG/DL (ref 65–140)
GLUCOSE SERPL-MCNC: 457 MG/DL (ref 65–140)
HCT VFR BLD AUTO: 32.8 % (ref 36.5–49.3)
HGB BLD-MCNC: 9.9 G/DL (ref 12–17)
IMM GRANULOCYTES # BLD AUTO: 0.21 THOUSAND/UL (ref 0–0.2)
IMM GRANULOCYTES NFR BLD AUTO: 2 % (ref 0–2)
LYMPHOCYTES # BLD AUTO: 0.74 THOUSANDS/ÂΜL (ref 0.6–4.47)
LYMPHOCYTES NFR BLD AUTO: 6 % (ref 14–44)
MAGNESIUM SERPL-MCNC: 2.6 MG/DL (ref 1.6–2.6)
MCH RBC QN AUTO: 27.2 PG (ref 26.8–34.3)
MCHC RBC AUTO-ENTMCNC: 30.2 G/DL (ref 31.4–37.4)
MCV RBC AUTO: 90 FL (ref 82–98)
MONOCYTES # BLD AUTO: 0.33 THOUSAND/ÂΜL (ref 0.17–1.22)
MONOCYTES NFR BLD AUTO: 3 % (ref 4–12)
NEUTROPHILS # BLD AUTO: 10.78 THOUSANDS/ÂΜL (ref 1.85–7.62)
NEUTS SEG NFR BLD AUTO: 89 % (ref 43–75)
NRBC BLD AUTO-RTO: 0 /100 WBCS
P AXIS: 0 DEGREES
PHOSPHATE SERPL-MCNC: 5.2 MG/DL (ref 2.3–4.1)
PLATELET # BLD AUTO: 171 THOUSANDS/UL (ref 149–390)
PMV BLD AUTO: 11.2 FL (ref 8.9–12.7)
POTASSIUM SERPL-SCNC: 5.7 MMOL/L (ref 3.5–5.3)
POTASSIUM SERPL-SCNC: 6.4 MMOL/L (ref 3.5–5.3)
POTASSIUM SERPL-SCNC: 6.7 MMOL/L (ref 3.5–5.3)
PR INTERVAL: 224 MS
PROT SERPL-MCNC: 7 G/DL (ref 6.4–8.4)
QRS AXIS: 120 DEGREES
QRSD INTERVAL: 136 MS
QT INTERVAL: 344 MS
QTC INTERVAL: 446 MS
RBC # BLD AUTO: 3.64 MILLION/UL (ref 3.88–5.62)
SODIUM SERPL-SCNC: 134 MMOL/L (ref 135–147)
SODIUM SERPL-SCNC: 136 MMOL/L (ref 135–147)
SODIUM SERPL-SCNC: 137 MMOL/L (ref 135–147)
T WAVE AXIS: 54 DEGREES
VENTRICULAR RATE: 101 BPM
WBC # BLD AUTO: 12.1 THOUSAND/UL (ref 4.31–10.16)

## 2022-10-17 PROCEDURE — 80048 BASIC METABOLIC PNL TOTAL CA: CPT | Performed by: PHYSICIAN ASSISTANT

## 2022-10-17 PROCEDURE — 80053 COMPREHEN METABOLIC PANEL: CPT | Performed by: INTERNAL MEDICINE

## 2022-10-17 PROCEDURE — 93010 ELECTROCARDIOGRAM REPORT: CPT | Performed by: INTERNAL MEDICINE

## 2022-10-17 PROCEDURE — 84484 ASSAY OF TROPONIN QUANT: CPT | Performed by: INTERNAL MEDICINE

## 2022-10-17 PROCEDURE — 82948 REAGENT STRIP/BLOOD GLUCOSE: CPT

## 2022-10-17 PROCEDURE — 99222 1ST HOSP IP/OBS MODERATE 55: CPT | Performed by: INTERNAL MEDICINE

## 2022-10-17 PROCEDURE — 94640 AIRWAY INHALATION TREATMENT: CPT

## 2022-10-17 PROCEDURE — 99223 1ST HOSP IP/OBS HIGH 75: CPT | Performed by: INTERNAL MEDICINE

## 2022-10-17 PROCEDURE — 94640 AIRWAY INHALATION TREATMENT: CPT | Performed by: SOCIAL WORKER

## 2022-10-17 PROCEDURE — 99232 SBSQ HOSP IP/OBS MODERATE 35: CPT | Performed by: PHYSICIAN ASSISTANT

## 2022-10-17 PROCEDURE — 84100 ASSAY OF PHOSPHORUS: CPT | Performed by: INTERNAL MEDICINE

## 2022-10-17 PROCEDURE — 85025 COMPLETE CBC W/AUTO DIFF WBC: CPT | Performed by: INTERNAL MEDICINE

## 2022-10-17 PROCEDURE — 94760 N-INVAS EAR/PLS OXIMETRY 1: CPT | Performed by: SOCIAL WORKER

## 2022-10-17 PROCEDURE — 93005 ELECTROCARDIOGRAM TRACING: CPT

## 2022-10-17 PROCEDURE — 83735 ASSAY OF MAGNESIUM: CPT | Performed by: INTERNAL MEDICINE

## 2022-10-17 PROCEDURE — 94760 N-INVAS EAR/PLS OXIMETRY 1: CPT

## 2022-10-17 RX ORDER — SODIUM POLYSTYRENE SULFONATE 4.1 MEQ/G
15 POWDER, FOR SUSPENSION ORAL; RECTAL ONCE
Status: DISCONTINUED | OUTPATIENT
Start: 2022-10-17 | End: 2022-10-17

## 2022-10-17 RX ORDER — METHYLPREDNISOLONE SODIUM SUCCINATE 40 MG/ML
40 INJECTION, POWDER, LYOPHILIZED, FOR SOLUTION INTRAMUSCULAR; INTRAVENOUS EVERY 12 HOURS SCHEDULED
Status: DISCONTINUED | OUTPATIENT
Start: 2022-10-17 | End: 2022-10-18

## 2022-10-17 RX ORDER — FUROSEMIDE 10 MG/ML
40 INJECTION INTRAMUSCULAR; INTRAVENOUS ONCE
Status: COMPLETED | OUTPATIENT
Start: 2022-10-17 | End: 2022-10-17

## 2022-10-17 RX ORDER — CALCIUM GLUCONATE 20 MG/ML
1 INJECTION, SOLUTION INTRAVENOUS ONCE
Status: COMPLETED | OUTPATIENT
Start: 2022-10-17 | End: 2022-10-18

## 2022-10-17 RX ORDER — ALBUTEROL SULFATE 2.5 MG/3ML
2.5 SOLUTION RESPIRATORY (INHALATION) EVERY 4 HOURS PRN
Status: DISCONTINUED | OUTPATIENT
Start: 2022-10-17 | End: 2022-10-24 | Stop reason: HOSPADM

## 2022-10-17 RX ORDER — CALCIUM GLUCONATE 20 MG/ML
1 INJECTION, SOLUTION INTRAVENOUS ONCE
Status: COMPLETED | OUTPATIENT
Start: 2022-10-17 | End: 2022-10-17

## 2022-10-17 RX ADMIN — INSULIN HUMAN 10 UNITS: 100 INJECTION, SOLUTION PARENTERAL at 23:18

## 2022-10-17 RX ADMIN — FUROSEMIDE 40 MG: 10 INJECTION, SOLUTION INTRAMUSCULAR; INTRAVENOUS at 23:29

## 2022-10-17 RX ADMIN — CARVEDILOL 6.25 MG: 6.25 TABLET, FILM COATED ORAL at 09:31

## 2022-10-17 RX ADMIN — GABAPENTIN 100 MG: 100 CAPSULE ORAL at 17:13

## 2022-10-17 RX ADMIN — PREDNISONE 40 MG: 20 TABLET ORAL at 00:41

## 2022-10-17 RX ADMIN — GUAIFENESIN 600 MG: 600 TABLET, EXTENDED RELEASE ORAL at 17:05

## 2022-10-17 RX ADMIN — CYCLOBENZAPRINE HYDROCHLORIDE 10 MG: 10 TABLET, FILM COATED ORAL at 00:41

## 2022-10-17 RX ADMIN — POTASSIUM CHLORIDE 20 MEQ: 1500 TABLET, EXTENDED RELEASE ORAL at 09:31

## 2022-10-17 RX ADMIN — GABAPENTIN 100 MG: 100 CAPSULE ORAL at 21:40

## 2022-10-17 RX ADMIN — ISODIUM CHLORIDE 3 ML: 0.03 SOLUTION RESPIRATORY (INHALATION) at 14:35

## 2022-10-17 RX ADMIN — GUAIFENESIN 600 MG: 600 TABLET, EXTENDED RELEASE ORAL at 09:31

## 2022-10-17 RX ADMIN — LIDOCAINE 5% 1 PATCH: 700 PATCH TOPICAL at 09:30

## 2022-10-17 RX ADMIN — CALCIUM GLUCONATE 1 G: 20 INJECTION, SOLUTION INTRAVENOUS at 17:18

## 2022-10-17 RX ADMIN — INSULIN LISPRO 2 UNITS: 100 INJECTION, SOLUTION INTRAVENOUS; SUBCUTANEOUS at 02:16

## 2022-10-17 RX ADMIN — METHYLPREDNISOLONE SODIUM SUCCINATE 40 MG: 40 INJECTION, POWDER, FOR SOLUTION INTRAMUSCULAR; INTRAVENOUS at 21:39

## 2022-10-17 RX ADMIN — LEVALBUTEROL HYDROCHLORIDE 1.25 MG: 1.25 SOLUTION, CONCENTRATE RESPIRATORY (INHALATION) at 08:26

## 2022-10-17 RX ADMIN — TAMSULOSIN HYDROCHLORIDE 0.4 MG: 0.4 CAPSULE ORAL at 17:05

## 2022-10-17 RX ADMIN — LEVALBUTEROL HYDROCHLORIDE 1.25 MG: 1.25 SOLUTION, CONCENTRATE RESPIRATORY (INHALATION) at 19:02

## 2022-10-17 RX ADMIN — APIXABAN 5 MG: 5 TABLET, FILM COATED ORAL at 00:41

## 2022-10-17 RX ADMIN — GABAPENTIN 100 MG: 100 CAPSULE ORAL at 00:41

## 2022-10-17 RX ADMIN — INSULIN LISPRO 1 UNITS: 100 INJECTION, SOLUTION INTRAVENOUS; SUBCUTANEOUS at 09:34

## 2022-10-17 RX ADMIN — PREDNISONE 40 MG: 20 TABLET ORAL at 09:31

## 2022-10-17 RX ADMIN — ASPIRIN 81 MG: 81 TABLET, COATED ORAL at 09:31

## 2022-10-17 RX ADMIN — FLUTICASONE FUROATE AND VILANTEROL TRIFENATATE 1 PUFF: 100; 25 POWDER RESPIRATORY (INHALATION) at 09:34

## 2022-10-17 RX ADMIN — NICOTINE 7 MG: 7 PATCH, EXTENDED RELEASE TRANSDERMAL at 09:30

## 2022-10-17 RX ADMIN — ALBUTEROL SULFATE 10 MG: 2.5 SOLUTION RESPIRATORY (INHALATION) at 22:50

## 2022-10-17 RX ADMIN — GUAIFENESIN 600 MG: 600 TABLET, EXTENDED RELEASE ORAL at 00:41

## 2022-10-17 RX ADMIN — INSULIN LISPRO 6 UNITS: 100 INJECTION, SOLUTION INTRAVENOUS; SUBCUTANEOUS at 12:56

## 2022-10-17 RX ADMIN — APIXABAN 5 MG: 5 TABLET, FILM COATED ORAL at 17:05

## 2022-10-17 RX ADMIN — INSULIN LISPRO 3 UNITS: 100 INJECTION, SOLUTION INTRAVENOUS; SUBCUTANEOUS at 17:14

## 2022-10-17 RX ADMIN — INSULIN LISPRO 2 UNITS: 100 INJECTION, SOLUTION INTRAVENOUS; SUBCUTANEOUS at 21:40

## 2022-10-17 RX ADMIN — ISODIUM CHLORIDE 3 ML: 0.03 SOLUTION RESPIRATORY (INHALATION) at 22:50

## 2022-10-17 RX ADMIN — ISODIUM CHLORIDE 3 ML: 0.03 SOLUTION RESPIRATORY (INHALATION) at 19:02

## 2022-10-17 RX ADMIN — LEVALBUTEROL HYDROCHLORIDE 1.25 MG: 1.25 SOLUTION, CONCENTRATE RESPIRATORY (INHALATION) at 14:33

## 2022-10-17 RX ADMIN — ISODIUM CHLORIDE 3 ML: 0.03 SOLUTION RESPIRATORY (INHALATION) at 08:26

## 2022-10-17 RX ADMIN — PRAVASTATIN SODIUM 80 MG: 80 TABLET ORAL at 17:05

## 2022-10-17 RX ADMIN — GABAPENTIN 100 MG: 100 CAPSULE ORAL at 09:31

## 2022-10-17 RX ADMIN — SODIUM CHLORIDE 250 ML: 0.9 INJECTION, SOLUTION INTRAVENOUS at 23:23

## 2022-10-17 RX ADMIN — UMECLIDINIUM 1 PUFF: 62.5 AEROSOL, POWDER ORAL at 09:34

## 2022-10-17 RX ADMIN — INSULIN ASPART 30 UNITS: 100 INJECTION, SUSPENSION SUBCUTANEOUS at 17:15

## 2022-10-17 RX ADMIN — INSULIN ASPART 50 UNITS: 100 INJECTION, SUSPENSION SUBCUTANEOUS at 09:30

## 2022-10-17 RX ADMIN — INSULIN ASPART 40 UNITS: 100 INJECTION, SUSPENSION SUBCUTANEOUS at 13:01

## 2022-10-17 RX ADMIN — FUROSEMIDE 40 MG: 40 TABLET ORAL at 09:31

## 2022-10-17 RX ADMIN — APIXABAN 5 MG: 5 TABLET, FILM COATED ORAL at 09:30

## 2022-10-17 RX ADMIN — FERROUS SULFATE TAB 325 MG (65 MG ELEMENTAL FE) 325 MG: 325 (65 FE) TAB at 09:31

## 2022-10-17 NOTE — CONSULTS
PULMONOLOGY CONSULT NOTE     Name: Andrew Lucas    Age & Sex: 78 y o  male   MRN: [id number]  Unit/Bed#: [facility] 627-01   Encounter: [id number]        Reason for consultation: COPD exacerbation currently on prednisone, acute on chronic respiratory acidosis    Requesting physician: Dianelys Mahmood MD    Assessment:   Patient admitted recently for COPD exacerbation and discharged on 10/15 with 5 days prednisone  Presented again yesterday with shortness of breath, acute onset  Patient has been continued on his 40 mg daily prednisone since admission, breo/incruse 100/25/62 5 daily  On physical exam, wheezes were not appreciated  Patient appeared clinically stable, resting comfortably in the room  Not thoroughly convinced patient experienced COPD exacerbation  Patient also requested percocet for pain management  Plan:  Continue home inhaler treatment for COPD  Discontinue current steroid regimen   Opt to treat back pain per primary team    History of Present Illness   HPI:  Raz Riddle Sr  is a 78 y o  male PMH T2DM long term insulin complicated by CAD and diabetic nephropathy, tobacco abuse, COPD, has been readmitted yesterday for suspected COPD exacerbation  Recently admitted with similar complaints of difficulty breathing/acute SOB this month  Patient was restarted on steroids on admission with 40 mg IV methylpredisolone 40 mg q12  Patient seen in person and did not appear to be in respiratory distress, no considerable wheezes noted on physical exam  Okay to take patient off of steroids  He also complains of back pain which should be treated via primary team plan  No further recommendations  Review of systems:  12 point review of systems was completed and was otherwise negative except as listed in HPI        Historical Information   Past Medical History:   Diagnosis Date   • Abdominal pain    • MARANDA (acute kidney injury) ([facility] ) 6/19/2018   • Cardiac disease    • CHF (congestive heart failure) ([place] [address] )    • COPD, severe (HCC)    • Coronary artery disease    • DDD (degenerative disc disease), lumbar 9/1/2020   • Diabetes mellitus ([place] [address] )    • Dyspnea    • GERD (gastroesophageal reflux disease)    • Hyperlipidemia    • Hypertension    • MI (myocardial infarction) ([place] [address] )     with 3 stents   • Nodule of apex of right lung    • JACQUELINE (obstructive sleep apnea)    • Prostate cancer [facility])      Past Surgical History:   Procedure Laterality Date   • ABDOMINAL SURGERY      exploratory   • ANGIOPLASTY      3 stents   • APPENDECTOMY     • COLONOSCOPY  2015   • CT NEEDLE BIOPSY LUNG  11/3/2020   • ESOPHAGOGASTRODUODENOSCOPY N/A 10/2/2017    Procedure: ESOPHAGOGASTRODUODENOSCOPY (EGD); Surgeon: Grisel Mcdaniel MD;  Location: [facility]; Service: Gastroenterology   • IR THORACENTESIS  11/3/2020   • KNEE CARTILAGE SURGERY     • OTHER SURGICAL HISTORY      stent placement   • PROSTATE SURGERY     • SKIN GRAFT      Basal cell CA back     Family History   Problem Relation Age of Onset   • Heart disease Father    • Other Father         Mesothelioma        Occupational History: former      Social History: Lives at home with son who works  Patient is retired  Receives meals on wheels son also cooks occasionally       Meds/Allergies   Current Facility-Administered Medications   Medication Dose Route Frequency   • acetaminophen (TYLENOL) tablet 650 mg  650 mg Oral Q6H PRN   • albuterol inhalation solution 2 5 mg  2 5 mg Nebulization Q4H PRN   • aluminum-magnesium hydroxide-simethicone (MYLANTA) oral suspension 30 mL  30 mL Oral Q6H PRN   • apixaban (ELIQUIS) tablet 5 mg  5 mg Oral BID   • aspirin (ECOTRIN LOW STRENGTH) EC tablet 81 mg  81 mg Oral Daily   • carvedilol (COREG) tablet 6 25 mg  6 25 mg Oral BID With Meals   • cyclobenzaprine (FLEXERIL) tablet 10 mg  10 mg Oral BID PRN   • docusate sodium (COLACE) capsule 100 mg  100 mg Oral BID PRN   • ferrous sulfate tablet 325 mg  325 mg Oral Daily With Breakfast   • fluticasone-vilanterol (BREO ELLIPTA) 100-25 mcg/inh inhaler 1 puff  1 puff Inhalation Daily   • furosemide (LASIX) tablet 40 mg  40 mg Oral Daily   • gabapentin (NEURONTIN) capsule 100 mg  100 mg Oral TID   • guaiFENesin (MUCINEX) 12 hr tablet 600 mg  600 mg Oral BID   • insulin aspart protamine-insulin aspart (NovoLOG 70/30) 100 units/mL subcutaneous injection 30 Units  30 Units Subcutaneous Before Dinner   • insulin aspart protamine-insulin aspart (NovoLOG 70/30) 100 units/mL subcutaneous injection 40 Units  40 Units Subcutaneous Daily Before Lunch   • insulin aspart protamine-insulin aspart (NovoLOG 70/30) 100 units/mL subcutaneous injection 50 Units  50 Units Subcutaneous Daily With Breakfast   • insulin aspart protamine-insulin aspart (NovoLOG 70/30) 100 units/mL subcutaneous injection 50 Units  50 Units Subcutaneous Daily Before Breakfast   • insulin lispro (HumaLOG) 100 units/mL subcutaneous injection 1-5 Units  1-5 Units Subcutaneous HS   • insulin lispro (HumaLOG) 100 units/mL subcutaneous injection 1-6 Units  1-6 Units Subcutaneous TID AC   • levalbuterol (XOPENEX) inhalation solution 1 25 mg  1 25 mg Nebulization TID    And   • sodium chloride 0 9 % inhalation solution 3 mL  3 mL Nebulization TID   • lidocaine (LIDODERM) 5 % patch 1 patch  1 patch Topical Daily   • methylPREDNISolone sodium succinate (Solu-MEDROL) injection 40 mg  40 mg Intravenous Q12H DRAKE   • nicotine (NICODERM CQ) 7 mg/24hr TD 24 hr patch 7 mg  7 mg Transdermal Daily   • ondansetron (ZOFRAN) injection 4 mg  4 mg Intravenous Q6H PRN   • pantoprazole (PROTONIX) EC tablet 40 mg  40 mg Oral Early Morning   • potassium chloride (K-DUR,KLOR-CON) CR tablet 20 mEq  20 mEq Oral Daily   • pravastatin (PRAVACHOL) tablet 80 mg  80 mg Oral Daily With Dinner   • tamsulosin (FLOMAX) capsule 0 4 mg  0 4 mg Oral Daily With Dinner   • umeclidinium bromide (INCRUSE ELLIPTA) 62 5 mcg/inh inhaler AEPB 1 puff  1 puff Inhalation Daily     Medications Prior to Admission   Medication   • apixaban (ELIQUIS) 5 mg   • aspirin (ECOTRIN LOW STRENGTH) 81 mg EC tablet   • carvedilol (COREG) 6 25 mg tablet   • cyclobenzaprine (FLEXERIL) 10 mg tablet   • ferrous sulfate 325 (65 Fe) mg tablet   • fluticasone-umeclidinium-vilanterol (Trelegy Ellipta) 100-62 5-25 MCG/INH inhaler   • furosemide (LASIX) 40 mg tablet   • gabapentin (NEURONTIN) 100 mg capsule   • glucose blood test strip   • insulin lispro protamine-insulin lispro (HumaLOG Mix 75/25) 100 units/mL   • lidocaine (Lidoderm) 5 %   • pantoprazole (PROTONIX) 40 mg tablet   • potassium chloride (K-DUR,KLOR-CON) 20 mEq tablet   • simvastatin (ZOCOR) 40 mg tablet   • tamsulosin (FLOMAX) 0 4 mg   • umeclidinium bromide (INCRUSE ELLIPTA) 62 5 mcg/inh AEPB inhaler     Allergies   Allergen Reactions   • Crestor [Rosuvastatin] Other (See Comments)     Unknown     • Lisinopril GI Intolerance, Dizziness and Abdominal Pain   • Metformin GI Intolerance       Vitals: Blood pressure 130/65, pulse 66, temperature (!) 97 2 °F (36 2 °C), resp  rate 18, height 6' (1 829 m), weight 97 5 kg (214 lb 15 2 oz), SpO2 94 %  Body mass index is 29 15 kg/m²  Intake/Output Summary (Last 24 hours) at 10/17/2022 1559  Last data filed at 10/17/2022 1500  Gross per 24 hour   Intake 480 ml   Output 1100 ml   Net -620 ml       Physical Exam  Constitutional:       Appearance: Normal appearance  He is obese  Cardiovascular:      Rate and Rhythm: Normal rate and regular rhythm  Pulses: Normal pulses  Heart sounds: Normal heart sounds  Pulmonary:      Effort: Pulmonary effort is normal       Breath sounds: Normal breath sounds  Skin:     General: Skin is warm and dry  Neurological:      Mental Status: He is alert  Labs: I have personally reviewed pertinent lab results    Laboratory and Diagnostics  Results from last 7 days   Lab Units 10/17/22  1003 10/16/22  2032 10/15/22  0611 10/11/22  0507   WBC Thousand/uL 12 10* 14 13* 6 23 6 92   HEMOGLOBIN g/dL 9 9* 10 2* 10 3* 10 2*   HEMATOCRIT % 32 8* 32 3* 32 3* 30 8*   PLATELETS Thousands/uL 171 183 122* 103*   NEUTROS PCT % 89*  --   --   --    BANDS PCT %  --  1  --   --    MONOS PCT % 3*  --   --   --    MONO PCT %  --  6  --   --      Results from last 7 days   Lab Units 10/17/22  1003 10/16/22  2032 10/15/22  0611 10/14/22  0637 10/13/22  0646 10/12/22  0910 10/11/22  0507   SODIUM mmol/L 137 135 139 139 137 135 135   POTASSIUM mmol/L 5 7* 5 9* 4 8 4 5 4 5 4 3 4 6   CHLORIDE mmol/L 105 104 108 108 107 104 104   CO2 mmol/L 25 28 25 25 25 25 24   ANION GAP mmol/L 7 3* 6 6 5 6 7   BUN mg/dL 78* 90* 72* 72* 74* 70* 50*   CREATININE mg/dL 2 61* 2 93* 2 66* 2 71* 2 81* 2 84* 2 51*   CALCIUM mg/dL 8 5 8 2* 8 2* 8 1* 8 3 8 5 8 3   GLUCOSE RANDOM mg/dL 386* 344* 218* 53* 73 108 165*   ALT U/L 30 39  --   --   --   --   --    AST U/L 10 25  --   --   --   --   --    ALK PHOS U/L 62 67  --   --   --   --   --    ALBUMIN g/dL 2 9* 3 0*  --   --   --   --   --    TOTAL BILIRUBIN mg/dL 0 27 0 37  --   --   --   --   --      Results from last 7 days   Lab Units 10/17/22  1003 10/13/22  0646   MAGNESIUM mg/dL 2 6  --    PHOSPHORUS mg/dL 5 2* 4 9*                                         ABG:           Imaging and other studies: I have personally reviewed pertinent reports  XR chest 1 view portable    Result Date: 10/17/2022  Impression: Stable appearance of volume loss and chronic opacities in the right Workstation performed: [id number]           EKG, Pathology, and Other Studies: I have personally reviewed pertinent reports  Code Status: Level 1 - Full Code    VTE Pharmacologic Prophylaxis: Eliquis  VTE Mechanical Prophylaxis: sequential compression device    Disclaimer: Portions of the record may have been created with voice recognition software  Occasional wrong word or "sound a like" substitutions may have occurred due to the inherent limitations of voice recognition software   Careful consideration should be taken to recognize, using context, where substitutions have occurred      [address] [place] [address] Internal Medicine Residency MS4

## 2022-10-17 NOTE — ASSESSMENT & PLAN NOTE
Recent admission for COPD exacerbation, discharge on 10/15 with 5 days prednisone  Re-presents due to acute onset shortness of breath  On admission found to by hypoxic with respiratory acidosis  · CXR on admission: Stable appearance of volume loss and chronic opacities in the right   · Continued on prednisone 40 mg daily on admission  · Continue with Breo/Incruse 100/25/62 5 daily  · Guaifenesin for expectoration 600mg twice daily  · Xopenex round-the-clock and p r n  Echo Ward   · No wheezing appreciated on my exam today  · Awaiting Pulmonary consult and recommendations

## 2022-10-17 NOTE — CASE MANAGEMENT
Case Management Discharge Planning Note    Patient name Lilly Khanna   Location [facility] 627/[facility] 857-79 MRN [id number]  : 1945 Date 10/17/2022       Current Admission Date: 10/16/2022  Current Admission Diagnosis:Acute on chronic respiratory failure with hypoxia and hypercapnia [facility])   Patient Active Problem List    Diagnosis Date Noted   • Hyperkalemia 10/17/2022   • A-fib ([facility] ) 10/10/2022   • Frequent hospital admissions 2022   • Medication management 2022   • Chest pain, musculoskeletal 2022   • Hypothermia 2022   • Epididymitis, bilateral 06/15/2022   • Chronic left-sided low back pain without sciatica 2022   • SOB (shortness of breath) 2022   • Stage 3b chronic kidney disease (CKD) ([facility] ) 2022   • History of prostate cancer 2022   • Adenocarcinoma of lung ([facility] ) 2022   • Fracture of navicular bone of left foot 2021   • Acute on chronic respiratory failure with hypoxia and hypercapnia ([facility] ) 04/15/2021   • PAD (peripheral artery disease) ([facility] ) 2021   • DDD (degenerative disc disease), lumbar 2020   • Anemia, iron deficiency 2020   • Lung nodule 2020   • Pulmonary hypertension ([facility] ) 2019   • JACQUELINE (obstructive sleep apnea) 2019   • COPD with acute exacerbation ([facility] ) 2019   • Oxygen dependent 2018   • Acute metabolic encephalopathy    • RBBB 2018   • Chronic diastolic (congestive) heart failure ([facility] ) 2018   • CAD (coronary artery disease) 2018   • Chronic diastolic heart failure ([facility] ) 2018   • Pleural effusion on right 10/31/2017   • COPD, severe ([facility] ) 2017   • Diabetic neuropathy ([facility] ) 2017   • Essential hypertension 2016   • Venous stasis dermatitis of both lower extremities 11/15/2016   • Type 2 diabetes mellitus with hyperglycemia, with long-term current use of insulin ([facility] ) 2016   • COPD exacerbation ([facility] ) 2016   • HLD (hyperlipidemia) 01/20/2016      LOS (days): 1  Geometric Mean LOS (GMLOS) (days):   Days to GMLOS:     OBJECTIVE:  Risk of Unplanned Readmission Score: 78 52         Current admission status: Inpatient   Preferred Pharmacy:   [address] [place] [address] [place] [address] [place] [address]  Phone: [phone] Fax: [phone] [address] [place],[address], [address] [place] [address]  Phone: [phone] Fax: [phone]    Primary Care Provider: Tracy Shah MD    Primary Insurance: [facility]  Secondary Insurance:     DISCHARGE DETAILS: This CM emailed high utilizer program to assess potentially following patient

## 2022-10-17 NOTE — ASSESSMENT & PLAN NOTE
Lab Results   Component Value Date    EGFR 19 10/16/2022    EGFR 22 10/15/2022    EGFR 21 10/14/2022    CREATININE 2 93 (H) 10/16/2022    CREATININE 2 66 (H) 10/15/2022    CREATININE 2 71 (H) 10/14/2022   Continue monitoring creatinine and GFR

## 2022-10-17 NOTE — UTILIZATION REVIEW
NOTIFICATION OF ADMISSION DISCHARGE   This is a Notification of Discharge from [address]  Please be advised that this patient has been discharge from our facility  Below you will find the admission and discharge date and time including the patient’s disposition  UTILIZATION REVIEW CONTACT:  Juvenal Stanton  Utilization   Network Utilization Review Department  Phone: [phone] x carefully listen to the prompts  All voicemails are confidential   Email: [email or web address]     ADMISSION INFORMATION  PRESENTATION DATE: 10/10/2022  9:01 AM  OBERVATION ADMISSION DATE:   INPATIENT ADMISSION DATE: 10/10/22 10:47 AM   DISCHARGE DATE: 10/15/2022  6:27 PM  DISPOSITION: Home with [place] with [address] INFORMATION:  Send all requests for admission clinical reviews, approved or denied determinations and any other requests to dedicated fax number below belonging to the campus where the patient is receiving treatment   List of dedicated fax numbers:  [address] DENIALS (Administrative/Medical Necessity) [phone]   [address] (Maternity/NICU/Pediatrics) [phone]   [facility] [phone]   [facility] [phone]   [phone]   220 [facility] [phone]   90 [facility] [phone]   [address] [place] [address] 119 [phone]   [facility]  [phone]   405 [facility] [phone]   412 [facility] 850 [address] [facility] [phone]

## 2022-10-17 NOTE — PROGRESS NOTES
[facility]  Progress Note - Laureano Mckinney Sr  1945, 78 y o  male MRN: [id number]  Unit/Bed#: [facility] 627-01 Encounter: [id number]  Primary Care Provider: Anamaria Martini MD   Date and time admitted to hospital: 10/16/2022  8:22 PM    * Acute on chronic respiratory failure with hypoxia and hypercapnia [facility])  Assessment & Plan  Presenting with complains of SOB  At baseline uses 2 L NC as needed however was acutely hypoxic on admission requirring 3-4 L NC   · VBG on admission: pH 4 23, CO2 69, O2 52, HCO3 28  · Currently, on room air with SpO2 low 90s at rest   · Monitor and titrate supplemental oxygen as needed   · Concern for possible COPD exacerbation, see below    · Appreciate pulmonary inputs   · Respiratory protocol     Possible COPD exacerbation ([facility] [address] )  Assessment & Plan  Recent admission for COPD exacerbation, discharge on 10/15 with 5 days prednisone  Re-presents due to acute onset shortness of breath  On admission found to by hypoxic with respiratory acidosis  · CXR on admission: Stable appearance of volume loss and chronic opacities in the right   · Continued on prednisone 40 mg daily on admission  · Continue with Breo/Incruse 100/25/62 5 daily  · Guaifenesin for expectoration 600mg twice daily  · Xopenex round-the-clock and p r n  Joanne Sharp   · No wheezing appreciated on my exam today  · Awaiting Pulmonary consult and recommendations     Type 2 diabetes mellitus with hyperglycemia, with long-term current use of insulin [facility])  Assessment & Plan  Lab Results   Component Value Date    HGBA1C 9 7 (H) 10/10/2022       Recent Labs     10/15/22  1619 10/17/22  0034 10/17/22  0750 10/17/22  1142   POCGLU 129 270* 179* 457*       Blood Sugar Average: Last 72 hrs:  (P) 302   · Patient reporting labile blood sugars since discharge, as high as in the 300s and as low as 20  · Recently discharged on new dosing regimen of Humalog 75/25 - 50 units, 40 units, 30 units with breakfast lunch and dinner respectively  · Appreciate endocrinology consult and recommendations  · Carb controlled diet   · Hypoglycemia protocol     Hyperkalemia  Assessment & Plan  K 5 9 on admission but results hemolyzed, repeat is 5 7   · Likely due to hyperglycemia bow s/p insulin, see plan above   · Follow-up repeat BMP     Stage 3b chronic kidney disease (CKD) [facility])  Assessment & Plan  Lab Results   Component Value Date    EGFR 22 10/17/2022    EGFR 19 10/16/2022    EGFR 22 10/15/2022    CREATININE 2 61 (H) 10/17/2022    CREATININE 2 93 (H) 10/16/2022    CREATININE 2 66 (H) 10/15/2022   ·   Evaluated by nephrology on recent admission, recent baseline has been in the low 2s and was discharged with creatinine of 2 6  · Creatinine is currently stable in mid-2s at this time   · Continue home dose diuretics   · Avoid nephrotoxins and hypotension   · Monitor BMP     Chronic diastolic (congestive) heart failure ([address] )  Assessment & Plan  Wt Readings from Last 3 Encounters:   10/17/22 97 5 kg (214 lb 15 2 oz)   10/15/22 98 7 kg (217 lb 11 1 oz)   09/17/22 101 kg (222 lb)   · Currently examines euvolemic   · Most recent echo with LVEF 60%  · Continue home dose Coreg and Lasix  · Monitor volume status     CAD (coronary artery disease)  Assessment & Plan  Status post prior stenting, presenting with SOB as above but denies chest pain   · NM stress test in May 2022 unremarkable   · Troponin trend negative and EKG without ischemic changes   · Continue aspirin, statin, BB     Essential hypertension  Assessment & Plan  · Hypertensive urgency on admission, now improved   · Continue home dose Coreg and Lasix  · Monitor routinely     A-fib ([address] )  Assessment & Plan  · Continue AC with Eliquis   · Continue home dose Coreg     HLD (hyperlipidemia)  Assessment & Plan  · On pravastatin 80 mg daily      VTE Pharmacologic Prophylaxis: VTE Score: 7 High Risk (Score >/= 5) - Pharmacological DVT Prophylaxis Ordered: apixaban (Eliquis)   Sequential Compression Devices Ordered  Patient Centered Rounds: I performed bedside rounds with nursing staff today  Discussions with Specialists or Other Care Team Provider: primary RN, case management, pulmonary, endocrine     Education and Discussions with Family / Patient: Patient declined call to   Time Spent for Care: 30 minutes  More than 50% of total time spent on counseling and coordination of care as described above  Current Length of Stay: 1 day(s)  Current Patient Status: Inpatient   Certification Statement: The patient will continue to require additional inpatient hospital stay due to monitoring oxygen saturaions, management of labile blood sugars, awiaiting pulmonary and endocrine recommendations  Discharge Plan: Anticipate discharge in 24-48 hrs to home  Code Status: Level 1 - Full Code    Subjective:   Patient offers no specific complaints at this time  Tells me his breathing feels overall improved but notes subjective wheezing and tightness  Denies chest pain  Notes he has had intermittent nose bleeds since discharge on Eliquis  Objective:     Vitals:   Temp (24hrs), Av 6 °F (36 4 °C), Min:97 2 °F (36 2 °C), Max:98 °F (36 7 °C)    Temp:  [97 2 °F (36 2 °C)-98 °F (36 7 °C)] 97 2 °F (36 2 °C)  HR:  [] 66  Resp:  [18-24] 18  BP: (130-200)/(65-90) 130/65  SpO2:  [90 %-100 %] 94 %  Body mass index is 29 15 kg/m²  Input and Output Summary (last 24 hours): Intake/Output Summary (Last 24 hours) at 10/17/2022 1455  Last data filed at 10/17/2022 1253  Gross per 24 hour   Intake 480 ml   Output 500 ml   Net -20 ml       Physical Exam:   Physical Exam  Vitals reviewed  Constitutional:       Appearance: He is obese  Cardiovascular:      Rate and Rhythm: Normal rate  Pulmonary:      Effort: Pulmonary effort is normal  No respiratory distress  Breath sounds: Decreased air movement present  Decreased breath sounds present  No wheezing, rhonchi or rales  Comments:  On room air with SpO2 92% at rest   Musculoskeletal:      Right lower leg: No edema  Left lower leg: No edema  Neurological:      General: No focal deficit present  Mental Status: He is alert and oriented to person, place, and time  Mental status is at baseline            Additional Data:     Labs:  Results from last 7 days   Lab Units 10/17/22  1003 10/16/22  2032   WBC Thousand/uL 12 10* 14 13*   HEMOGLOBIN g/dL 9 9* 10 2*   HEMATOCRIT % 32 8* 32 3*   PLATELETS Thousands/uL 171 183   BANDS PCT %  --  1   NEUTROS PCT % 89*  --    LYMPHS PCT % 6*  --    LYMPHO PCT %  --  17   MONOS PCT % 3*  --    MONO PCT %  --  6   EOS PCT % 0 5     Results from last 7 days   Lab Units 10/17/22  1003   SODIUM mmol/L 137   POTASSIUM mmol/L 5 7*   CHLORIDE mmol/L 105   CO2 mmol/L 25   BUN mg/dL 78*   CREATININE mg/dL 2 61*   ANION GAP mmol/L 7   CALCIUM mg/dL 8 5   ALBUMIN g/dL 2 9*   TOTAL BILIRUBIN mg/dL 0 27   ALK PHOS U/L 62   ALT U/L 30   AST U/L 10   GLUCOSE RANDOM mg/dL 386*         Results from last 7 days   Lab Units 10/17/22  1142 10/17/22  0750 10/17/22  0034 10/15/22  1619 10/15/22  1146 10/15/22  0802 10/15/22  3937 10/14/22  2116 10/14/22  1634 10/14/22  1206 10/14/22  0734 10/14/22  0701   POC GLUCOSE mg/dl 457* 179* 270* 129 159* 224* 215* 182* 104 368* 130 43*               Lines/Drains:  Invasive Devices  Report    Peripheral Intravenous Line  Duration           Peripheral IV 10/16/22 Left Forearm <1 day                      Imaging: Reviewed radiology reports from this admission including: chest xray    Recent Cultures (last 7 days):         Last 24 Hours Medication List:   Current Facility-Administered Medications   Medication Dose Route Frequency Provider Last Rate   • acetaminophen  650 mg Oral Q6H PRN Maicol Ortega MD     • albuterol  2 5 mg Nebulization Q4H PRN Belkis Ziegler MD     • aluminum-magnesium hydroxide-simethicone  30 mL Oral Q6H PRN Maicol Ortega MD     • apixaban  5 mg Oral BID Maicol Ortega MD     • aspirin  81 mg Oral Daily Maicol Ortega MD     • carvedilol  6 25 mg Oral BID With Meals Maicol Ortega MD     • cyclobenzaprine  10 mg Oral BID PRN Maicol Ortega MD     • docusate sodium  100 mg Oral BID PRN Maicol Ortega MD     • ferrous sulfate  325 mg Oral Daily With Breakfast Maicol Ortega MD     • fluticasone-vilanterol  1 puff Inhalation Daily Maicol Ortega MD     • furosemide  40 mg Oral Daily Maicol Ortega MD     • gabapentin  100 mg Oral TID Maicol Ortega MD     • guaiFENesin  600 mg Oral BID Maicol Ortega MD     • insulin aspart protamine-insulin aspart  30 Units Subcutaneous Before Lyndsey Delcid MD     • insulin aspart protamine-insulin aspart  40 Units Subcutaneous Daily Before Lunch Maicol Ortega MD     • insulin aspart protamine-insulin aspart  50 Units Subcutaneous Daily With Breakfast Maicol Ortega MD     • insulin aspart protamine-insulin aspart  50 Units Subcutaneous Daily Before Breakfast Maicol Ortega MD     • insulin lispro  1-5 Units Subcutaneous HS Maicol Ortega MD     • insulin lispro  1-6 Units Subcutaneous TID AC Maicol Ortega MD     • levalbuterol  1 25 mg Nebulization TID Maicol Ortega MD      And   • sodium chloride  3 mL Nebulization TID Maicol Ortega MD     • lidocaine  1 patch Topical Daily Maicol Ortega MD     • methylPREDNISolone sodium succinate  40 mg Intravenous Q12H [facility] Prashant Dunlap MD     • nicotine  7 mg Transdermal Daily Maicol Ortega MD     • ondansetron  4 mg Intravenous Q6H PRN Maicol Ortega MD     • pantoprazole  40 mg Oral Early Morning Maicol Ortega MD     • potassium chloride  20 mEq Oral Daily Maicol Ortega MD     • pravastatin  80 mg Oral Daily With Lyndsey Delcid MD     • tamsulosin  0 4 mg Oral Daily With Lyndsey Delcid MD     • umeclidinium bromide  1 puff Inhalation Daily Yue Webster MD          Today, Patient Was Seen By: Tee Charlton    **Please Note: This note may have been constructed using a voice recognition system  **

## 2022-10-17 NOTE — ASSESSMENT & PLAN NOTE
Presenting with complains of SOB   At baseline uses 2 L NC as needed however was acutely hypoxic on admission requirring 3-4 L NC   · VBG on admission: pH 4 23, CO2 69, O2 52, HCO3 28  · Currently, on room air with SpO2 low 90s at rest   · Monitor and titrate supplemental oxygen as needed   · Concern for possible COPD exacerbation, see below    · Appreciate pulmonary inputs   · Respiratory protocol

## 2022-10-17 NOTE — ASSESSMENT & PLAN NOTE
Wt Readings from Last 3 Encounters:   10/17/22 97 5 kg (214 lb 15 2 oz)   10/15/22 98 7 kg (217 lb 11 1 oz)   09/17/22 101 kg (222 lb)   · Currently examines euvolemic   · Most recent echo with LVEF 60%  · Continue home dose Coreg and Lasix  · Monitor volume status

## 2022-10-17 NOTE — ASSESSMENT & PLAN NOTE
Lab Results   Component Value Date    EGFR 22 10/17/2022    EGFR 19 10/16/2022    EGFR 22 10/15/2022    CREATININE 2 61 (H) 10/17/2022    CREATININE 2 93 (H) 10/16/2022    CREATININE 2 66 (H) 10/15/2022   ·   Evaluated by nephrology on recent admission, recent baseline has been in the low 2s and was discharged with creatinine of 2 6  · Creatinine is currently stable in mid-2s at this time   · Continue home dose diuretics   · Avoid nephrotoxins and hypotension   · Monitor BMP

## 2022-10-17 NOTE — ED PROVIDER NOTES
History  Chief Complaint   Patient presents with   • Shortness of Breath     Pt recently D/C from hospital  Today started with increased dyspnea on exertion  Home SPO2 80      46-year-old male with history of CHF and COPD presents in respiratory distress  EMS report they were called to patient's house by family member and found the patient to be dyspneic with oxygen saturation of 88%, improved to 94% on 2 L nasal cannula  Patient reports acute onset dyspnea earlier today  On 2 L nasal cannula baseline  Patient denies chest pain, fevers, cough, calf pain, history of PE or DVT  Discharge from the hospital yesterday for respiratory distress likely due to COPD exacerbation  Patient has been compliant with daily steroids  Prior to Admission Medications   Prescriptions Last Dose Informant Patient Reported? Taking? apixaban (ELIQUIS) 5 mg   No No   Sig: Take 1 tablet (5 mg total) by mouth 2 (two) times a day   aspirin (ECOTRIN LOW STRENGTH) 81 mg EC tablet   No No   Sig: Take 1 tablet (81 mg total) by mouth daily   carvedilol (COREG) 6 25 mg tablet   No No   Sig: Take 1 tablet (6 25 mg total) by mouth 2 (two) times a day with meals   cyclobenzaprine (FLEXERIL) 10 mg tablet   No No   Sig: Take 1 tablet (10 mg total) by mouth 2 (two) times a day as needed for muscle spasms   ferrous sulfate 325 (65 Fe) mg tablet   No No   Sig: Take 1 tablet (325 mg total) by mouth daily with breakfast   fluticasone-umeclidinium-vilanterol (Trelegy Ellipta) 100-62 5-25 MCG/INH inhaler   No No   Sig: Inhale 1 puff daily Rinse mouth after use  furosemide (LASIX) 40 mg tablet   No No   Sig: Take 1 tablet (40 mg total) by mouth daily   gabapentin (NEURONTIN) 100 mg capsule   No No   Sig: Take 1 capsule (100 mg total) by mouth 3 (three) times a day   glucose blood test strip   No No   Sig: Use 1 each daily as needed (check sugars) Use as instructed   Uses One Touch Ultra test strip   insulin lispro protamine-insulin lispro (HumaLOG Mix 75/25) 100 units/mL   No No   Sig: Inject 45 Units under the skin 3 (three) times a day 50 units with breakfast 40 units with lunch30 units with dinner   lidocaine (Lidoderm) 5 %   No No   Sig: Apply 1 patch topically daily for 6 days Remove & Discard patch within 12 hours or as directed by MD   pantoprazole (PROTONIX) 40 mg tablet   No No   Sig: Take 1 tablet (40 mg total) by mouth daily in the early morning Do not start before October 16, 2022  potassium chloride (K-DUR,KLOR-CON) 20 mEq tablet   No No   Sig: Take 1 tablet (20 mEq total) by mouth daily   simvastatin (ZOCOR) 40 mg tablet   No No   Sig: Take 1 tablet (40 mg total) by mouth daily   tamsulosin (FLOMAX) 0 4 mg   No No   Sig: Take 1 capsule (0 4 mg total) by mouth daily with dinner   umeclidinium bromide (INCRUSE ELLIPTA) 62 5 mcg/inh AEPB inhaler   No No   Sig: Inhale 1 puff daily Do not start before October 16, 2022  Facility-Administered Medications: None       Past Medical History:   Diagnosis Date   • Abdominal pain    • MARANDA (acute kidney injury) ([facility] [address] ) 6/19/2018   • Cardiac disease    • CHF (congestive heart failure) (HCC)    • COPD, severe (HCC)    • Coronary artery disease    • DDD (degenerative disc disease), lumbar 9/1/2020   • Diabetes mellitus (HCC)    • Dyspnea    • GERD (gastroesophageal reflux disease)    • Hyperlipidemia    • Hypertension    • MI (myocardial infarction) ([facility] [address] )     with 3 stents   • Nodule of apex of right lung    • JACQUELINE (obstructive sleep apnea)    • Prostate cancer [facility])        Past Surgical History:   Procedure Laterality Date   • ABDOMINAL SURGERY      exploratory   • ANGIOPLASTY      3 stents   • APPENDECTOMY     • COLONOSCOPY  2015   • CT NEEDLE BIOPSY LUNG  11/3/2020   • ESOPHAGOGASTRODUODENOSCOPY N/A 10/2/2017    Procedure: ESOPHAGOGASTRODUODENOSCOPY (EGD); Surgeon: Gustavo Taylor MD;  Location: [facility];   Service: Gastroenterology   • IR THORACENTESIS  11/3/2020   • KNEE CARTILAGE SURGERY     • OTHER SURGICAL HISTORY      stent placement   • PROSTATE SURGERY     • SKIN GRAFT      Basal cell CA back       Family History   Problem Relation Age of Onset   • Heart disease Father    • Other Father         Mesothelioma      I have reviewed and agree with the history as documented  E-Cigarette/Vaping   • E-Cigarette Use Never User      E-Cigarette/Vaping Substances   • Nicotine No    • THC No    • CBD No    • Flavoring No    • Other No    • Unknown No      Social History     Tobacco Use   • Smoking status: Former Smoker     Packs/day: 1 50     Years: 50 00     Pack years: 75 00     Start date: 38     Quit date: 2020     Years since quittin 2   • Smokeless tobacco: Never Used   Vaping Use   • Vaping Use: Never used   Substance Use Topics   • Alcohol use: Never   • Drug use: No        Review of Systems   Unable to perform ROS: Acuity of condition       Physical Exam  ED Triage Vitals   Temperature Pulse Respirations Blood Pressure SpO2   10/16/22 2135 10/16/22 2023 10/16/22 2023 10/16/22 2027 10/16/22 2023   98 °F (36 7 °C) 100 (!) 24 (!) 200/90 90 %      Temp Source Heart Rate Source Patient Position - Orthostatic VS BP Location FiO2 (%)   10/16/22 2135 10/16/22 2023 10/16/22 2023 10/16/22 2023 --   Oral Monitor Lying Left arm       Pain Score       10/17/22 0114       8             Orthostatic Vital Signs  Vitals:    10/16/22 2330 10/17/22 0004 10/17/22 0107 10/17/22 0747   BP: 142/66 (!) 171/75  130/65   Pulse: 102  82 66   Patient Position - Orthostatic VS:           Physical Exam  Vitals and nursing note reviewed  Constitutional:       General: He is in acute distress  Appearance: He is well-developed  He is ill-appearing  He is not toxic-appearing or diaphoretic  HENT:      Head: Normocephalic and atraumatic  Mouth/Throat:      Comments: Nebulizer treatment in mouth  Eyes:      Conjunctiva/sclera: Conjunctivae normal       Pupils: Pupils are equal, round, and reactive to light  Cardiovascular:      Rate and Rhythm: Normal rate  Rhythm irregular  Pulses: Normal pulses  Heart sounds: No murmur heard  Pulmonary:      Effort: Tachypnea and respiratory distress present  Breath sounds: Decreased breath sounds present  No wheezing, rhonchi or rales  Chest:      Chest wall: No tenderness  Abdominal:      Palpations: Abdomen is soft  Tenderness: There is no abdominal tenderness  Musculoskeletal:      Cervical back: Normal range of motion and neck supple  Right lower leg: No tenderness  Edema present  Left lower leg: No tenderness  Edema present  Skin:     General: Skin is warm and dry  Capillary Refill: Capillary refill takes less than 2 seconds  Coloration: Skin is not cyanotic  Neurological:      Mental Status: He is alert and oriented to person, place, and time     Psychiatric:         Mood and Affect: Mood normal          Behavior: Behavior normal          ED Medications  Medications   apixaban (ELIQUIS) tablet 5 mg (5 mg Oral Given 10/17/22 0930)   aspirin (ECOTRIN LOW STRENGTH) EC tablet 81 mg (81 mg Oral Given 10/17/22 0931)   carvedilol (COREG) tablet 6 25 mg (6 25 mg Oral Given 10/17/22 0931)   cyclobenzaprine (FLEXERIL) tablet 10 mg (10 mg Oral Given 10/17/22 0041)   ferrous sulfate tablet 325 mg (325 mg Oral Given 10/17/22 0931)   fluticasone-vilanterol (BREO ELLIPTA) 100-25 mcg/inh inhaler 1 puff (1 puff Inhalation Given 10/17/22 0934)   furosemide (LASIX) tablet 40 mg (40 mg Oral Given 10/17/22 0931)   gabapentin (NEURONTIN) capsule 100 mg (100 mg Oral Given 10/17/22 0931)   insulin aspart protamine-insulin aspart (NovoLOG 70/30) 100 units/mL subcutaneous injection 50 Units (50 Units Subcutaneous Not Given 10/17/22 0933)   insulin aspart protamine-insulin aspart (NovoLOG 70/30) 100 units/mL subcutaneous injection 50 Units (50 Units Subcutaneous Given 10/17/22 0930)   insulin aspart protamine-insulin aspart (NovoLOG 70/30) 100 units/mL subcutaneous injection 40 Units (40 Units Subcutaneous Given 10/17/22 1301)   insulin aspart protamine-insulin aspart (NovoLOG 70/30) 100 units/mL subcutaneous injection 30 Units (has no administration in time range)   lidocaine (LIDODERM) 5 % patch 1 patch (1 patch Topical Medication Applied 10/17/22 0930)   pantoprazole (PROTONIX) EC tablet 40 mg (40 mg Oral Refused 10/17/22 0504)   potassium chloride (K-DUR,KLOR-CON) CR tablet 20 mEq (20 mEq Oral Given 10/17/22 0931)   pravastatin (PRAVACHOL) tablet 80 mg (has no administration in time range)   tamsulosin (FLOMAX) capsule 0 4 mg (has no administration in time range)   umeclidinium bromide (INCRUSE ELLIPTA) 62 5 mcg/inh inhaler AEPB 1 puff (1 puff Inhalation Given 10/17/22 0934)   acetaminophen (TYLENOL) tablet 650 mg (has no administration in time range)   docusate sodium (COLACE) capsule 100 mg (has no administration in time range)   ondansetron (ZOFRAN) injection 4 mg (has no administration in time range)   aluminum-magnesium hydroxide-simethicone (MYLANTA) oral suspension 30 mL (has no administration in time range)   guaiFENesin (MUCINEX) 12 hr tablet 600 mg (600 mg Oral Given 10/17/22 0931)   levalbuterol (XOPENEX) inhalation solution 1 25 mg (1 25 mg Nebulization Given 10/17/22 0826)     And   sodium chloride 0 9 % inhalation solution 3 mL (3 mL Nebulization Given 10/17/22 0826)   levalbuterol (XOPENEX) inhalation solution 1 25 mg (has no administration in time range)     And   sodium chloride 0 9 % inhalation solution 3 mL (has no administration in time range)   insulin lispro (HumaLOG) 100 units/mL subcutaneous injection 1-6 Units (6 Units Subcutaneous Given 10/17/22 1256)   insulin lispro (HumaLOG) 100 units/mL subcutaneous injection 1-5 Units (2 Units Subcutaneous Given 10/17/22 0216)   nicotine (NICODERM CQ) 7 mg/24hr TD 24 hr patch 7 mg (7 mg Transdermal Medication Applied 10/17/22 0930)   methylPREDNISolone sodium succinate (Solu-MEDROL) injection 40 mg (has no administration in time range)   albuterol (FOR EMS ONLY) (2 5 mg/3 mL) 0 083 % inhalation solution 2 5 mg (0 mg Does not apply Given to EMS 10/16/22 2042)   ipratropium-albuterol (FOR EMS ONLY) (DUO-NEB) 0 5-2 5 mg/3 mL inhalation solution 3 mL (0 mL Does not apply Given to EMS 10/16/22 2042)   albuterol inhalation solution 10 mg (10 mg Nebulization Given 10/16/22 2055)     And   ipratropium (ATROVENT) 0 02 % inhalation solution 1 mg (1 mg Nebulization Given 10/16/22 2055)     And   sodium chloride 0 9 % inhalation solution 3 mL (3 mL Nebulization Given 10/16/22 2055)       Diagnostic Studies  Results Reviewed     Procedure Component Value Units Date/Time    Comprehensive metabolic panel [[id number]]  (Abnormal) Collected: 10/17/22 1003    Lab Status: Final result Specimen: Blood from Arm, Right Updated: 10/17/22 1030     Sodium 137 mmol/L      Potassium 5 7 mmol/L      Chloride 105 mmol/L      CO2 25 mmol/L      ANION GAP 7 mmol/L      BUN 78 mg/dL      Creatinine 2 61 mg/dL      Glucose 386 mg/dL      Calcium 8 5 mg/dL      Corrected Calcium 9 4 mg/dL      AST 10 U/L      ALT 30 U/L      Alkaline Phosphatase 62 U/L      Total Protein 7 0 g/dL      Albumin 2 9 g/dL      Total Bilirubin 0 27 mg/dL      eGFR 22 ml/min/1 73sq m     Narrative:      [place] guidelines for Chronic Kidney Disease (CKD):   •  Stage 1 with normal or high GFR (GFR > 90 mL/min/1 73 square meters)  •  Stage 2 Mild CKD (GFR = 60-89 mL/min/1 73 square meters)  •  Stage 3A Moderate CKD (GFR = 45-59 mL/min/1 73 square meters)  •  Stage 3B Moderate CKD (GFR = 30-44 mL/min/1 73 square meters)  •  Stage 4 Severe CKD (GFR = 15-29 mL/min/1 73 square meters)  •  Stage 5 End Stage CKD (GFR <15 mL/min/1 73 square meters)  Note: GFR calculation is accurate only with a steady state creatinine    Phosphorus [[id number]]  (Abnormal) Collected: 10/17/22 1003    Lab Status: Final result Specimen: Blood from Arm, Right Updated: 10/17/22 1030     Phosphorus 5 2 mg/dL     Magnesium [[id number]]  (Normal) Collected: 10/17/22 1003    Lab Status: Final result Specimen: Blood from Arm, Right Updated: 10/17/22 1030     Magnesium 2 6 mg/dL     CBC and differential [[id number]]  (Abnormal) Collected: 10/17/22 1003    Lab Status: Final result Specimen: Blood from Arm, Right Updated: 10/17/22 1012     WBC 12 10 Thousand/uL      RBC 3 64 Million/uL      Hemoglobin 9 9 g/dL      Hematocrit 32 8 %      MCV 90 fL      MCH 27 2 pg      MCHC 30 2 g/dL      RDW 15 3 %      MPV 11 2 fL      Platelets 207 Thousands/uL      nRBC 0 /100 WBCs      Neutrophils Relative 89 %      Immat GRANS % 2 %      Lymphocytes Relative 6 %      Monocytes Relative 3 %      Eosinophils Relative 0 %      Basophils Relative 0 %      Neutrophils Absolute 10 78 Thousands/µL      Immature Grans Absolute 0 21 Thousand/uL      Lymphocytes Absolute 0 74 Thousands/µL      Monocytes Absolute 0 33 Thousand/µL      Eosinophils Absolute 0 00 Thousand/µL      Basophils Absolute 0 04 Thousands/µL     HS Troponin I 4hr [[id number]]  (Normal) Collected: 10/17/22 0109    Lab Status: Final result Specimen: Blood from Arm, Right Updated: 10/17/22 0147     hs TnI 4hr 40 ng/L      Delta 4hr hsTnI 0 ng/L     HS Troponin I 2hr [[id number]]  (Normal) Collected: 10/16/22 2211    Lab Status: Final result Specimen: Blood from Arm, Left Updated: 10/16/22 2259     hs TnI 2hr 38 ng/L      Delta 2hr hsTnI -2 ng/L     COVID only [[id number]]  (Normal) Collected: 10/16/22 2211    Lab Status: Final result Specimen: Nares from Nose Updated: 10/16/22 2256     SARS-CoV-2 Negative    Narrative:      FOR PEDIATRIC PATIENTS - copy/paste COVID Guidelines URL to browser: [email or web address]    SARS-CoV-2 assay is a Nucleic Acid Amplification assay intended for the  qualitative detection of nucleic acid from SARS-CoV-2 in nasopharyngeal  swabs   Results are for the presumptive identification of SARS-CoV-2 RNA  Positive results are indicative of infection with SARS-CoV-2, the virus  causing COVID-19, but do not rule out bacterial infection or co-infection  with other viruses  Laboratories within the [place] and its  territories are required to report all positive results to the appropriate  public health authorities  Negative results do not preclude SARS-CoV-2  infection and should not be used as the sole basis for treatment or other  patient management decisions  Negative results must be combined with  clinical observations, patient history, and epidemiological information  This test has not been FDA cleared or approved  This test has been authorized by FDA under an Emergency Use Authorization  (EUA)  This test is only authorized for the duration of time the  declaration that circumstances exist justifying the authorization of the  emergency use of an in vitro diagnostic tests for detection of SARS-CoV-2  virus and/or diagnosis of COVID-19 infection under section 564(b)(1) of  the Act, 21 U  S C  475MYI-3(E)(2), unless the authorization is terminated  or revoked sooner  The test has been validated but independent review by FDA  and CLIA is pending  Test performed using [employer] GeneXpert: This RT-PCR assay targets N2,  a region unique to SARS-CoV-2  A conserved region in the E-gene was chosen  for pan-Sarbecovirus detection which includes SARS-CoV-2  According to CMS-2020-01-R, this platform meets the definition of high-throughput technology      HS Troponin 0hr (reflex protocol) [[id number]]  (Normal) Collected: 10/16/22 2032    Lab Status: Final result Specimen: Blood from Arm, Right Updated: 10/16/22 2106     hs TnI 0hr 40 ng/L     Manual Differential(PHLEBS Do Not Order) [[id number]]  (Abnormal) Collected: 10/16/22 2032    Lab Status: Final result Specimen: Blood from Arm, Right Updated: 10/16/22 2105     Segmented % 71 %      Bands % 1 %      Lymphocytes % 17 %      Monocytes % 6 %      Eosinophils, % 5 %      Basophils % 0 %      Absolute Neutrophils 10 17 Thousand/uL      Lymphocytes Absolute 2 40 Thousand/uL      Monocytes Absolute 0 85 Thousand/uL      Eosinophils Absolute 0 71 Thousand/uL      Basophils Absolute 0 00 Thousand/uL      Total Counted --     RBC Morphology Present     Ovalocytes Present     Poikilocytes Present     Platelet Estimate Adequate    CBC and differential [[id number]]  (Abnormal) Collected: 10/16/22 2032    Lab Status: Final result Specimen: Blood from Arm, Right Updated: 10/16/22 2105     WBC 14 13 Thousand/uL      RBC 3 78 Million/uL      Hemoglobin 10 2 g/dL      Hematocrit 32 3 %      MCV 85 fL      MCH 27 0 pg      MCHC 31 6 g/dL      RDW 15 2 %      MPV 11 3 fL      Platelets 343 Thousands/uL     Narrative: This is an appended report  These results have been appended to a previously verified report      Comprehensive metabolic panel [[id number]]  (Abnormal) Collected: 10/16/22 2032    Lab Status: Final result Specimen: Blood from Arm, Right Updated: 10/16/22 2057     Sodium 135 mmol/L      Potassium 5 9 mmol/L      Chloride 104 mmol/L      CO2 28 mmol/L      ANION GAP 3 mmol/L      BUN 90 mg/dL      Creatinine 2 93 mg/dL      Glucose 344 mg/dL      Calcium 8 2 mg/dL      Corrected Calcium 9 0 mg/dL      AST 25 U/L      ALT 39 U/L      Alkaline Phosphatase 67 U/L      Total Protein 7 5 g/dL      Albumin 3 0 g/dL      Total Bilirubin 0 37 mg/dL      eGFR 19 ml/min/1 73sq m     Narrative:      [facility] guidelines for Chronic Kidney Disease (CKD):   •  Stage 1 with normal or high GFR (GFR > 90 mL/min/1 73 square meters)  •  Stage 2 Mild CKD (GFR = 60-89 mL/min/1 73 square meters)  •  Stage 3A Moderate CKD (GFR = 45-59 mL/min/1 73 square meters)  •  Stage 3B Moderate CKD (GFR = 30-44 mL/min/1 73 square meters)  •  Stage 4 Severe CKD (GFR = 15-29 mL/min/1 73 square meters)  •  Stage 5 End Stage CKD (GFR <15 mL/min/1 73 square meters)  Note: GFR calculation is accurate only with a steady state creatinine    Blood gas, venous [[id number]]  (Abnormal) Collected: 10/16/22 2032    Lab Status: Final result Specimen: Blood from Arm, Right Updated: 10/16/22 2039     pH, Dwain 7 236     pCO2, Dwain 69 5 mm Hg      pO2, Dwain 52 2 mm Hg      HCO3, Dwain 28 8 mmol/L      Base Excess, Dwain 0 1 mmol/L      O2 Content, Dwain 12 7 ml/dL      O2 HGB, VENOUS 78 7 %                  XR chest 1 view portable   Final Result by Moira Wilkins MD (10/17 0845)      Stable appearance of volume loss and chronic opacities in the right                  Workstation performed: [id number]               Procedures  ECG 12 Lead Documentation Only    Date/Time: 10/16/2022 9:42 PM  Performed by: Venita Campoverde MD  Authorized by: Hussain Macedo MD     Indications / Diagnosis:  Respiratory distress  ECG reviewed by me, the ED Provider: yes    Patient location:  ED  Previous ECG:     Previous ECG:  Unavailable    Comparison to cardiac monitor: Yes    Interpretation:     Interpretation: abnormal    Rate:     ECG rate:  101    ECG rate assessment: tachycardic    Rhythm:     Rhythm: sinus rhythm    Ectopy:     Ectopy: none    QRS:     QRS axis:  Normal    QRS intervals:   Wide  Conduction:     Conduction: abnormal      Abnormal conduction: incomplete RBBB and 1st degree    ST segments:     ST segments:  Normal  T waves:     T waves: normal    POC Cardiac US    Date/Time: 10/16/2022 10:18 PM  Performed by: Venita Campoverde MD  Authorized by: Hussain Macedo MD     Patient location:  ED  Other Assisting Provider: No    Procedure details:     Exam Type:  Diagnostic    Indications: respiratory distress      Assessment / Evaluation for: cardiac function, pericardial effusion and right heart strain (suspected pulmonary embolism)      Exam Type: initial exam      Image quality: limited diagnostic      Image availability:  Images available in PACS, still images obtained and video obtained  Patient Details:     Cardiac Rhythm:  Regular    Mechanical ventilation: No    Cardiac findings:     Echo technique: limited 2D and M-mode      Views obtained: parasternal short axis and subcostal      Pericardial effusion: absent      Tamponade physiology: absent      Wall motion: normal      LV systolic function: normal      RV dilation: none    Pulmonary findings:     Left Lung Findings: left lung sliding      B-lines: no B-lines present    Interpretation:     Fluid Status:  Euvolemic          ED Course                             SBIRT 22yo+    Flowsheet Row Most Recent Value   SBIRT (23 yo +)    In order to provide better care to our patients, we are screening all of our patients for alcohol and drug use  Would it be okay to ask you these screening questions? Yes Filed at: 10/16/2022 2045   Initial Alcohol Screen: US AUDIT-C     1  How often do you have a drink containing alcohol? 0 Filed at: 10/16/2022 2045   2  How many drinks containing alcohol do you have on a typical day you are drinking? 0 Filed at: 10/16/2022 2045   3a  Male UNDER 65: How often do you have five or more drinks on one occasion? 0 Filed at: 10/16/2022 2045   3b  FEMALE Any Age, or MALE 65+: How often do you have 4 or more drinks on one occassion? 0 Filed at: 10/16/2022 2045   Audit-C Score 0 Filed at: 10/16/2022 2045   BERTHA: How many times in the past year have you    Used an illegal drug or used a prescription medication for non-medical reasons?  Never Filed at: 10/16/2022 2045                MDM  Number of Diagnoses or Management Options  Acute on chronic respiratory acidosis ([facility] [address] ): established and worsening  COPD exacerbation ([address] ): new and requires workup  High blood pressure: established and worsening  Hyperglycemia: established and worsening  Hyperkalemia: established and worsening  Hypoxia: established and worsening  Diagnosis management comments: 37-year-old male with history of CHF and COPD presents in respiratory distress  Hospitalized and discharged yesterday for COPD exacerbation  Tachypneic, mild tachycardia, afebrile  Diminished lung sounds bilaterally, bilateral lower extremity edema, otherwise normal exam with normal mentation  Symptoms likely due to COPD exacerbation based on patient's response to nebulizer treatment and decreased breath sounds bilaterally  Differential also includes ACS, CHF exacerbation, pneumothorax, less likely pneumonia    Plan:  [place] neb, chest x-ray, CBC, CMP, troponin, EKG, frequent reassessment, plan to admit       Amount and/or Complexity of Data Reviewed  Clinical lab tests: ordered and reviewed  Tests in the radiology section of CPT®: ordered and reviewed  Obtain history from someone other than the patient: yes  Review and summarize past medical records: yes  Independent visualization of images, tracings, or specimens: yes    Risk of Complications, Morbidity, and/or Mortality  Presenting problems: moderate  Diagnostic procedures: low  Management options: low    Patient Progress  Patient progress: improved      Disposition  Final diagnoses:   Acute on chronic respiratory acidosis ([facility] [address] )   Hypoxia   Hyperglycemia   High blood pressure   Hyperkalemia   COPD exacerbation ([facility] [address] )     Time reflects when diagnosis was documented in both MDM as applicable and the Disposition within this note     Time User Action Codes Description Comment    10/16/2022  9:19 PM Siobhan Or Add [J96 02,  J96 12] Acute on chronic respiratory acidosis ([address] )     10/16/2022  9:19 PM Siobhan Or Add [R09 02] Hypoxia     10/16/2022  9:20 PM Siobhan Or Add [R73 9] Hyperglycemia     10/16/2022  9:21 PM Siobhan Or Add [I10] High blood pressure     10/16/2022  9:45 PM Siobhan Or Add [E87 5] Hyperkalemia     10/16/2022 10:20 PM Siobhan Or Add [J44 1] COPD exacerbation ([address] )     10/16/2022 10:22 PM Lianne Roberson Add [J44 1] COPD with acute exacerbation [facility])       ED Disposition     ED Disposition   Admit    Condition   Stable    Date/Time   Sun Oct 16, 2022 10:20 PM    Comment   Case was discussed with Dr José Luis Menard and the patient's admission status was agreed to be Admission Status: inpatient status to the service of Dr José Luis Menard   Follow-up Information    None         Current Discharge Medication List      CONTINUE these medications which have NOT CHANGED    Details   apixaban (ELIQUIS) 5 mg Take 1 tablet (5 mg total) by mouth 2 (two) times a day  Qty: 60 tablet, Refills: 0    Associated Diagnoses: A-fib (HCC)      aspirin (ECOTRIN LOW STRENGTH) 81 mg EC tablet Take 1 tablet (81 mg total) by mouth daily  Qty: 30 tablet, Refills: 0    Associated Diagnoses: Essential hypertension; Coronary artery disease involving native coronary artery of native heart without angina pectoris      carvedilol (COREG) 6 25 mg tablet Take 1 tablet (6 25 mg total) by mouth 2 (two) times a day with meals  Qty: 60 tablet, Refills: 0    Associated Diagnoses: Essential hypertension; Coronary artery disease involving native coronary artery of native heart without angina pectoris      cyclobenzaprine (FLEXERIL) 10 mg tablet Take 1 tablet (10 mg total) by mouth 2 (two) times a day as needed for muscle spasms  Qty: 21 tablet, Refills: 0    Associated Diagnoses: Muscle spasm      ferrous sulfate 325 (65 Fe) mg tablet Take 1 tablet (325 mg total) by mouth daily with breakfast  Qty: 30 tablet, Refills: 0    Associated Diagnoses: Stage 3a chronic kidney disease (HCC)      fluticasone-umeclidinium-vilanterol (Trelegy Ellipta) 100-62 5-25 MCG/INH inhaler Inhale 1 puff daily Rinse mouth after use    Qty: 1 each, Refills: 0    Associated Diagnoses: COPD exacerbation ([address] )      furosemide (LASIX) 40 mg tablet Take 1 tablet (40 mg total) by mouth daily  Qty: 30 tablet, Refills: 0    Associated Diagnoses: Acute on chronic diastolic (congestive) heart failure ([facility])      gabapentin (NEURONTIN) 100 mg capsule Take 1 capsule (100 mg total) by mouth 3 (three) times a day  Qty: 90 capsule, Refills: 0    Associated Diagnoses: Chronic bilateral low back pain without sciatica      glucose blood test strip Use 1 each daily as needed (check sugars) Use as instructed  Uses One Touch Ultra test strip  Qty: 50 strip, Refills: 2    Comments: Needs to be delivered  Has no transportation  Associated Diagnoses: Type 2 diabetes mellitus with hypoglycemia without coma, with long-term current use of insulin ([facility])      insulin lispro protamine-insulin lispro (HumaLOG Mix 75/25) 100 units/mL Inject 45 Units under the skin 3 (three) times a day 50 units with breakfast 40 units with lunch30 units with dinner  Qty: 40 5 mL, Refills: 0    Associated Diagnoses: Type 2 diabetes mellitus with hypoglycemia without coma, with long-term current use of insulin ([facility])      lidocaine (Lidoderm) 5 % Apply 1 patch topically daily for 6 days Remove & Discard patch within 12 hours or as directed by MD  Qty: 6 patch, Refills: 0    Associated Diagnoses: Chest wall pain      pantoprazole (PROTONIX) 40 mg tablet Take 1 tablet (40 mg total) by mouth daily in the early morning Do not start before October 16, 2022    Qty: 30 tablet, Refills: 1    Associated Diagnoses: Type 2 diabetes mellitus with hypoglycemia without coma, with long-term current use of insulin ([facility])      potassium chloride (K-DUR,KLOR-CON) 20 mEq tablet Take 1 tablet (20 mEq total) by mouth daily  Qty: 30 tablet, Refills: 0    Comments: Take this pill when you take your lasix  Associated Diagnoses: Chronic respiratory failure with hypoxia and hypercapnia ([facility])      simvastatin (ZOCOR) 40 mg tablet Take 1 tablet (40 mg total) by mouth daily  Qty: 30 tablet, Refills: 0    Associated Diagnoses: Hypercholesterolemia      tamsulosin (FLOMAX) 0 4 mg Take 1 capsule (0 4 mg total) by mouth daily with dinner  Qty: 30 capsule, Refills: 0    Associated Diagnoses: Acute cystitis without hematuria      umeclidinium bromide (INCRUSE ELLIPTA) 62 5 mcg/inh AEPB inhaler Inhale 1 puff daily Do not start before October 16, 2022  Qty: 30 blister, Refills: 0    Associated Diagnoses: COPD with acute exacerbation ([facility] )           No discharge procedures on file  PDMP Review       Value Time User    PDMP Reviewed  Yes 8/29/2022 10:03 PM David Salgado DO           ED Provider  Attending physically available and evaluated Cheri Isbell Sr    I managed the patient along with the ED Attending      Electronically Signed by         Sherry Pacheco MD  10/17/22 2972

## 2022-10-17 NOTE — PLAN OF CARE
Problem: PAIN - ADULT  Goal: Verbalizes/displays adequate comfort level or baseline comfort level  Description: Interventions:  - Encourage patient to monitor pain and request assistance  - Assess pain using appropriate pain scale  - Administer analgesics based on type and severity of pain and evaluate response  - Implement non-pharmacological measures as appropriate and evaluate response  - Consider cultural and social influences on pain and pain management  - Notify physician/advanced practitioner if interventions unsuccessful or patient reports new pain  Outcome: Progressing     Problem: INFECTION - ADULT  Goal: Absence or prevention of progression during hospitalization  Description: INTERVENTIONS:  - Assess and monitor for signs and symptoms of infection  - Monitor lab/diagnostic results  - Monitor all insertion sites, i e  indwelling lines, tubes, and drains  - Monitor endotracheal if appropriate and nasal secretions for changes in amount and color  - [place] appropriate cooling/warming therapies per order  - Administer medications as ordered  - Instruct and encourage patient and family to use good hand hygiene technique  - Identify and instruct in appropriate isolation precautions for identified infection/condition  Outcome: Progressing  Goal: Absence of fever/infection during neutropenic period  Description: INTERVENTIONS:  - Monitor WBC    Outcome: Progressing

## 2022-10-17 NOTE — ASSESSMENT & PLAN NOTE
· Hypertensive urgency on admission, now improved   · Continue home dose Coreg and Lasix  · Monitor routinely

## 2022-10-17 NOTE — ASSESSMENT & PLAN NOTE
Lab Results   Component Value Date    HGBA1C 9 7 (H) 10/10/2022       Recent Labs     10/15/22  0633 10/15/22  0802 10/15/22  1146 10/15/22  1619   POCGLU 215* 224* 159* 129       Blood Sugar Average: Last 72 hrs:   patient states that current blood sugars are still up and as high as in the 300s would go down suddenly to 20  He is on a new dosing regimen of 75/25 50 units, 40 units, 30 units at breakfast lunch and dinner respectively  As patient still has blood sugars all over the place and current use of prednisone; will place endocrinology consult

## 2022-10-17 NOTE — PLAN OF CARE
Problem: PAIN - ADULT  Goal: Verbalizes/displays adequate comfort level or baseline comfort level  Description: Interventions:  - Encourage patient to monitor pain and request assistance  - Assess pain using appropriate pain scale  - Administer analgesics based on type and severity of pain and evaluate response  - Implement non-pharmacological measures as appropriate and evaluate response  - Consider cultural and social influences on pain and pain management  - Notify physician/advanced practitioner if interventions unsuccessful or patient reports new pain  Outcome: Progressing     Problem: INFECTION - ADULT  Goal: Absence or prevention of progression during hospitalization  Description: INTERVENTIONS:  - Assess and monitor for signs and symptoms of infection  - Monitor lab/diagnostic results  - Monitor all insertion sites, i e  indwelling lines, tubes, and drains  - Monitor endotracheal if appropriate and nasal secretions for changes in amount and color  - [place] appropriate cooling/warming therapies per order  - Administer medications as ordered  - Instruct and encourage patient and family to use good hand hygiene technique  - Identify and instruct in appropriate isolation precautions for identified infection/condition  Outcome: Progressing  Goal: Absence of fever/infection during neutropenic period  Description: INTERVENTIONS:  - Monitor WBC    Outcome: Progressing     Problem: SAFETY ADULT  Goal: Patient will remain free of falls  Description: INTERVENTIONS:  - Educate patient/family on patient safety including physical limitations  - Instruct patient to call for assistance with activity   - Consult OT/PT to assist with strengthening/mobility   - Keep Call bell within reach  - Keep bed low and locked with side rails adjusted as appropriate  - Keep care items and personal belongings within reach  - Initiate and maintain comfort rounds  - Make Fall Risk Sign visible to staff  - Offer Toileting every 2 Hours, in advance of need  - Initiate/Maintain bed alarm  - Obtain necessary fall risk management equipment: alarm  - Apply yellow socks and bracelet for high fall risk patients  - Consider moving patient to room near nurses station  Outcome: Progressing  Goal: Maintain or return to baseline ADL function  Description: INTERVENTIONS:  -  Assess patient's ability to carry out ADLs; assess patient's baseline for ADL function and identify physical deficits which impact ability to perform ADLs (bathing, care of mouth/teeth, toileting, grooming, dressing, etc )  - Assess/evaluate cause of self-care deficits   - Assess range of motion  - Assess patient's mobility; develop plan if impaired  - Assess patient's need for assistive devices and provide as appropriate  - Encourage maximum independence but intervene and supervise when necessary  - Involve family in performance of ADLs  - Assess for home care needs following discharge   - Consider OT consult to assist with ADL evaluation and planning for discharge  - Provide patient education as appropriate  Outcome: Progressing  Goal: Maintains/Returns to pre admission functional level  Description: INTERVENTIONS:  - Perform BMAT or MOVE assessment daily    - Set and communicate daily mobility goal to care team and patient/family/caregiver  - Collaborate with rehabilitation services on mobility goals if consulted  - Perform Range of Motion 3 times a day  - Reposition patient every 2 hours    - Dangle patient 3 times a day  - Stand patient3 times a day  - Ambulate patient 3 times a day  - Out of bed to chair 3 times a day   - Out of bed for meals bed times a day  - Out of bed for toileting  - Record patient progress and toleration of activity level   Outcome: Progressing     Problem: DISCHARGE PLANNING  Goal: Discharge to home or other facility with appropriate resources  Description: INTERVENTIONS:  - Identify barriers to discharge w/patient and caregiver  - Arrange for needed discharge resources and transportation as appropriate  - Identify discharge learning needs (meds, wound care, etc )  - Arrange for interpretive services to assist at discharge as needed  - Refer to Case Management Department for coordinating discharge planning if the patient needs post-hospital services based on physician/advanced practitioner order or complex needs related to functional status, cognitive ability, or social support system  Outcome: Progressing     Problem: Knowledge Deficit  Goal: Patient/family/caregiver demonstrates understanding of disease process, treatment plan, medications, and discharge instructions  Description: Complete learning assessment and assess knowledge base    Interventions:  - Provide teaching at level of understanding  - Provide teaching via preferred learning methods  Outcome: Progressing     Problem: RESPIRATORY - ADULT  Goal: Achieves optimal ventilation and oxygenation  Description: INTERVENTIONS:  - Assess for changes in respiratory status  - Assess for changes in mentation and behavior  - Position to facilitate oxygenation and minimize respiratory effort  - Oxygen administered by appropriate delivery if ordered  - Initiate smoking cessation education as indicated  - Encourage broncho-pulmonary hygiene including cough, deep breathe, Incentive Spirometry  - Assess the need for suctioning and aspirate as needed  - Assess and instruct to report SOB or any respiratory difficulty  - Respiratory Therapy support as indicated  Outcome: Progressing

## 2022-10-17 NOTE — ASSESSMENT & PLAN NOTE
Status post prior stenting, presenting with SOB as above but denies chest pain   · NM stress test in May 2022 unremarkable   · Troponin trend negative and EKG without ischemic changes   · Continue aspirin, statin, BB

## 2022-10-17 NOTE — UTILIZATION REVIEW
Initial Clinical Review    Admission: Date/Time/Statement:   Admission Orders (From admission, onward)     Ordered        10/16/22 2220  [address] [facility] [address] [place]  [address]                      Orders Placed This Encounter   Procedures   • INPATIENT ADMISSION     Standing Status:   Standing     Number of Occurrences:   1     Order Specific Question:   Level of Care     Answer:   Med Surg [16]     Order Specific Question:   Estimated length of stay     Answer:   More than 2 Midnights     Order Specific Question:   Certification     Answer:   I certify that inpatient services are medically necessary for this patient for a duration of greater than two midnights  See H&P and MD Progress Notes for additional information about the patient's course of treatment  ED Arrival Information     Expected   -    Arrival   10/16/2022 20:20    Acuity   Urgent            Means of arrival   Ambulance    Escorted by   [address] [place] [address]    Admission type   Emergency            Arrival complaint   -           Chief Complaint   Patient presents with   • Shortness of Breath     Pt recently D/C from hospital  Today started with increased dyspnea on exertion  Home SPO2 88%      Initial Presentation: 78 y o  male to ED for sudden shortness of breath  Also with bld sugar issues  Repots his bld sugars have been “all over the place” as high as in the 300s and suddenly with drop in the 20s  During this time that he was looking for his test strips, he suddenly developed shortness of breath  In ED given 10 mg albuterol nebs with Atrovent with some improvement but still with abdominal breathing  Recent hospitalization from 10/10 to 10/15/22 for COPD exacerbation and discharge on prednisone pulsed steroids for 5 days  PMH for Severe COPD, currently in inhalers   CAD, although recent stress testing showed ejection fraction of 61% and with note of left ventricular perfusion defect medium size in basal to inferior location which is paradoxica1  HLD, Atrial fib on Eliquis  CKD stage IIIB, Chronic diastolic CHF, HTN and N3QJ on Insulin  Active smoker 1 to 2 cigarettes/ day  Admit Inpatient level of care for Exacerbation of COPD, Acute on chronic respiratory failure with hypoxia, Type 2 DM with hyperglycemia  Hypoxia and respiratory acidosis  Continue prednisone daily for now  Continue Breo/Incruse 100/25/62 5 daily  Guaifenesin bid  Xopenex scheduled and prn  Pulmonary consult  New insulin dose regimen  Endocrinology consult  On exam; mild distress with bronchial breath sounds, expiratory rhonchi  Wheezing  Date: 10/17   Day 2:   Progress notes; At baseline uses 2 L NC as needed however was acutely hypoxic on admission requirring 3-4 L NC  VBG on admission: pH 4 23, CO2 69, O2 52, HCO3 28  Currently on room air with SpO2 low 90s at rest  Pulmonary consult pending  Continue prednisone daily  K 5 9 on admit but results hemolyzed, repeat is 5 7  Likely due to hyperglycemia  F/u repeat BMP  On exam; Decreased air movement and decrease breath sound  Endocrinology cons; Type 2 DM with uncontrolled hyperglycemia  COPD on steriods  Glucose uncontrolled on current regimen likely from concurrent high dose steroids for his COPD  If recheck glucose still >400, recommend starting on IV insulin for glycemic control  Otherwise will continue on current insulin regimen for now  Monitor Accu-Cheks and adjust insulin regimen as indicated  Pulmonary cons; COPD with recent  acute exacerbation that has been improving  Continue Prednisone taper and bronchodilators         ED Triage Vitals   Temperature Pulse Respirations Blood Pressure SpO2   10/16/22 2135 10/16/22 2023 10/16/22 2023 10/16/22 2027 10/16/22 2023   98 °F (36 7 °C) 100 (!) 24 (!) 200/90 90 %      Temp Source Heart Rate Source Patient Position - Orthostatic VS BP Location FiO2 (%)   10/16/22 2135 10/16/22 2023 10/16/22 2023 10/16/22 2023 --   Oral Monitor Lying Left arm       Pain Score 10/17/22 0114       8          Wt Readings from Last 1 Encounters:   10/17/22 97 5 kg (214 lb 15 2 oz)     Additional Vital Signs:   10/17/22 0828 -- -- -- -- -- -- 28 2 L/min Nasal cannula --   10/17/22 07:47:29 97 2 °F (36 2 °C) Abnormal  66 18 130/65 87 100 % -- -- -- --   10/17/22 01:07:10 97 7 °F (36 5 °C) 82 20 -- -- 100 % 32 3 L/min Nasal cannula --   10/17/22 0004 -- -- -- 171/75   Abnormal  107 -- -- -- -- --   10/16/22 2330 -- 102 -- 142/66 95 94 % 28 2 L/min Nasal cannula --   10/16/22 2135 98 °F (36 7 °C) -- -- -- -- -- -- -- -- --   10/16/22 2027 -- -- -- 200/90   Abnormal  -- -- -- -- -- --   10/16/22 2025 -- -- -- -- -- -- -- -- Nasal cannula       10/16/22 2025 -- -- -- -- -- -- -- -- Nasal cannula  --   O2 Device: 4L at 10/16/22 2025     Pertinent Labs/Diagnostic Test Results:   XR chest 1 view portable   Final Result by Kevin Lopez MD (10/17 0845)      Stable appearance of volume loss and chronic opacities in the right                  Workstation performed: [id number]           Results from last 7 days   Lab Units 10/16/22  2211   SARS-COV-2  Negative     Lab Units 10/17/22  1003 10/16/22  2032   WBC Thousand/uL 12 10* 14 13*   HEMOGLOBIN g/dL 9 9* 10 2*   HEMATOCRIT % 32 8* 32 3*   PLATELETS Thousands/uL 171 183   NEUTROS ABS Thousands/µL 10 78*  --    BANDS PCT %  --  1         Lab Units 10/17/22  1003 10/16/22  2032   SODIUM mmol/L 137 135   POTASSIUM mmol/L 5 7* 5 9*   CHLORIDE mmol/L 105 104   CO2 mmol/L 25 28   ANION GAP mmol/L 7 3*   BUN mg/dL 78* 90*   CREATININE mg/dL 2 61* 2 93*   EGFR ml/min/1 73sq m 22 19   CALCIUM mg/dL 8 5 8 2*   MAGNESIUM mg/dL 2 6  --    PHOSPHORUS mg/dL 5 2*  --      Results from last 7 days   Lab Units 10/17/22  1003 10/16/22  2032   AST U/L 10 25   ALT U/L 30 39   ALK PHOS U/L 62 67   TOTAL PROTEIN g/dL 7 0 7 5   ALBUMIN g/dL 2 9* 3 0*   TOTAL BILIRUBIN mg/dL 0 27 0 37     Lab Units 10/17/22  0750 10/17/22  0034   POC GLUCOSE mg/dl 179* 270*     Lab Units 10/17/22  1003 10/16/22  2032   GLUCOSE RANDOM mg/dL 386* 344*             BETA-HYDROXYBUTYRATE   Date Value Ref Range Status   06/15/2019 0 2 <0 6 mmol/L Final          Results from last 7 days   Lab Units 10/16/22  2032   PH MAYE  7 236*   PCO2 MAYE mm Hg 69 5*   PO2 MAYE mm Hg 52 2*   HCO3 MAYE mmol/L 28 8   BASE EXC MAYE mmol/L 0 1   O2 CONTENT MAYE ml/dL 12 7   O2 HGB, VENOUS % 78 7             Lab Units 10/17/22  0109 10/16/22  2211 10/16/22  2032   HS TNI 0HR ng/L  --   --  40   HS TNI 2HR ng/L  --  38  --    HSTNI D2 ng/L  --  -2  --    HS TNI 4HR ng/L 40  --   --    HSTNI D4 ng/L 0  --   --            ED Treatment:   Medication Administration from 10/16/2022 2020 to 10/16/2022 2358       Date/Time Order Dose Route Action     10/16/2022 2055 albuterol inhalation solution 10 mg 10 mg Nebulization Given     10/16/2022 2055 ipratropium (ATROVENT) 0 02 % inhalation solution 1 mg 1 mg Nebulization Given     10/16/2022 2055 sodium chloride 0 9 % inhalation solution 3 mL 3 mL Nebulization Given        Past Medical History:   Diagnosis Date   • Abdominal pain    • MARANDA (acute kidney injury) ([place] [address] ) 6/19/2018   • Cardiac disease    • CHF (congestive heart failure) ([facility])    • COPD, severe ([facility])    • Coronary artery disease    • DDD (degenerative disc disease), lumbar 9/1/2020   • Diabetes mellitus ([facility])    • Dyspnea    • GERD (gastroesophageal reflux disease)    • Hyperlipidemia    • Hypertension    • MI (myocardial infarction) ([place] [address] )     with 3 stents   • Nodule of apex of right lung    • JACQUELINE (obstructive sleep apnea)    • Prostate cancer ([place] [address] )      Present on Admission:  • Acute on chronic respiratory failure with hypoxia ([facility])  • Chronic diastolic (congestive) heart failure ([facility])  • CAD (coronary artery disease)  • COPD exacerbation ([facility])  • Stage 3b chronic kidney disease (CKD) ([place] [address] )  • A-fib ([place] [address] )  • Essential hypertension  • HLD (hyperlipidemia)      Admitting Diagnosis: Hyperkalemia [E87 5]  High blood pressure [I10]  Hyperglycemia [R73 9]  Hypoxia [R09 02]  COPD exacerbation (HCC) [J44 1]  COPD with acute exacerbation (HCC) [J44 1]  Acute on chronic respiratory acidosis (HCC) [J96 02, J96 12]  Age/Sex: 78 y o  male     Admission Orders:  Scheduled Medications:  apixaban, 5 mg, Oral, BID  aspirin, 81 mg, Oral, Daily  carvedilol, 6 25 mg, Oral, BID With Meals  ferrous sulfate, 325 mg, Oral, Daily With Breakfast  fluticasone-vilanterol, 1 puff, Inhalation, Daily  furosemide, 40 mg, Oral, Daily  gabapentin, 100 mg, Oral, TID  guaiFENesin, 600 mg, Oral, BID  insulin aspart protamine-insulin aspart, 30 Units, Subcutaneous, Before Dinner  insulin aspart protamine-insulin aspart, 40 Units, Subcutaneous, Daily Before Lunch  insulin aspart protamine-insulin aspart, 50 Units, Subcutaneous, Daily With Breakfast  insulin lispro, 1-5 Units, Subcutaneous, HS  insulin lispro, 1-6 Units, Subcutaneous, TID AC  levalbuterol, 1 25 mg, Nebulization, TID   And  sodium chloride, 3 mL, Nebulization, TID  lidocaine, 1 patch, Topical, Daily  nicotine, 7 mg, Transdermal, Daily  pantoprazole, 40 mg, Oral, Early Morning  potassium chloride, 20 mEq, Oral, Daily  pravastatin, 80 mg, Oral, Daily With Dinner  predniSONE, 40 mg, Oral, Daily  tamsulosin, 0 4 mg, Oral, Daily With Dinner  umeclidinium bromide, 1 puff, Inhalation, Daily      Continuous IV Infusions: None     PRN Meds:  acetaminophen, 650 mg, Oral, Q6H PRN  aluminum-magnesium hydroxide-simethicone, 30 mL, Oral, Q6H PRN  cyclobenzaprine, 10 mg, Oral, BID PRN 10/17 x1  docusate sodium, 100 mg, Oral, BID PRN  levalbuterol, 1 25 mg, Nebulization, Q6H PRN   And  sodium chloride, 3 mL, Nebulization, Q6H PRN  ondansetron, 4 mg, Intravenous, Q6H PRN        IP CONSULT TO PULMONOLOGY  IP CONSULT TO ENDOCRINOLOGY  IP CONSULT TO CASE MANAGEMENT    Network Utilization Review Department  ATTENTION: Please call with any questions or concerns to [phone] and carefully listen to the prompts so that you are directed to the right person  All voicemails are confidential   Lanette Sees all requests for admission clinical reviews, approved or denied determinations and any other requests to dedicated fax number below belonging to the campus where the patient is receiving treatment   List of dedicated fax numbers for the Facilities:  [address] DENIALS (Administrative/Medical Necessity) [phone]   [address] (Maternity/NICU/Pediatrics) [phone]   8 Annabelle Roth [phone]   [facility] [phone]   [address] [address] [phone]   1553 [facility] 134 5 [facility] [phone]

## 2022-10-17 NOTE — ASSESSMENT & PLAN NOTE
Wt Readings from Last 3 Encounters:   10/15/22 98 7 kg (217 lb 11 1 oz)   09/17/22 101 kg (222 lb)   09/02/22 107 kg (235 lb 8 3 oz)   Currently with out signs of failure although short of breath most likely secondary to COPD exacerbation  Continue Coreg and Lasix

## 2022-10-17 NOTE — ASSESSMENT & PLAN NOTE
K 5 9 on admission but results hemolyzed, repeat is 5 7   · Likely due to hyperglycemia bow s/p insulin, see plan above   · Follow-up repeat BMP

## 2022-10-17 NOTE — TELEPHONE ENCOUNTER
Labs in system    ----- Message from Lawyer Mikala MD sent at 10/15/2022  6:41 PM EDT -----  Regarding: Hospital follow up  Patient was recently hospitalized at [facility] with MARANDA on CKD, COPD exacerbation    Saw Dr Erinn May in the past but not compliant with follow up      Please arrange HFU with Dr Erinn May or first available AP in 2-3 weeks with repeat labs (please order CBC, RFP, PTH and UPC)  Thanks

## 2022-10-17 NOTE — CASE MANAGEMENT
Case Management Assessment & Discharge Planning Note    Patient name Fly Stade   [facility]N [id number]  : 1945 Date 10/17/2022       Current Admission Date: 10/16/2022  Current Admission Diagnosis:COPD exacerbation [facility])   Patient Active Problem List    Diagnosis Date Noted   • A-fib [facility]) 10/10/2022   • Frequent hospital admissions 2022   • Medication management 2022   • Chest pain, musculoskeletal 2022   • Hypothermia 2022   • Epididymitis, bilateral 06/15/2022   • Acute respiratory failure with hypoxia ([facility] ) 2022   • Chronic left-sided low back pain without sciatica 2022   • SOB (shortness of breath) 2022   • Stage 3b chronic kidney disease (CKD) ([facility] ) 2022   • History of prostate cancer 2022   • Adenocarcinoma of lung ([facility] ) 2022   • Fracture of navicular bone of left foot 2021   • Acute on chronic respiratory failure with hypoxia ([facility] ) 04/15/2021   • PAD (peripheral artery disease) ([facility] ) 2021   • DDD (degenerative disc disease), lumbar 2020   • Anemia, iron deficiency 2020   • Lung nodule 2020   • Pulmonary hypertension ([facility] ) 2019   • JACQUELINE (obstructive sleep apnea) 2019   • COPD with acute exacerbation ([facility] ) 2019   • Oxygen dependent 2018   • Acute metabolic encephalopathy    • RBBB 2018   • Chronic diastolic (congestive) heart failure ([address] ) 2018   • CAD (coronary artery disease) 2018   • Chronic diastolic heart failure ([facility] ) 2018   • Pleural effusion on right 10/31/2017   • COPD, severe ([address] ) 2017   • Diabetic neuropathy ([facility] ) 2017   • Essential hypertension 2016   • Venous stasis dermatitis of both lower extremities 11/15/2016   • Type 2 diabetes mellitus with hyperglycemia, with long-term current use of insulin ([facility] ) 2016   • COPD exacerbation ([facility] ) 2016   • HLD (hyperlipidemia) 2016 LOS (days): 1  Geometric Mean LOS (GMLOS) (days):   Days to GMLOS:     OBJECTIVE:  PATIENT READMITTED TO HOSPITAL  Risk of Unplanned Readmission Score: 79 43         Current admission status: Inpatient       Preferred Pharmacy:   [address] [place] [address] [address] [place] [address] [place]  [address] [address]  Phone: [phone] Fax: [phone] [facility] 94 [facility] 38 210 [facility]  Phone: [phone] Fax: [phone]    Primary Care Provider: Nadya Flores MD    Primary Insurance: [facility]  Secondary Insurance:     ASSESSMENT:  [address], [address] Representative - Son   Primary Phone: [phone] (Mobile)  Home Phone: [phone]               Advance Directives  Does patient have a [address]?: No  Was patient offered paperwork?: Yes  Does patient have Advance Directives?: No  Was patient offered paperwork?: Yes    Readmission Root Cause  30 Day Readmission: Yes  Who directed you to return to the hospital?: Self  Did you understand whom to contact if you had questions or problems?: Yes  Did you get your prescriptions before you left the hospital?: Yes  Were you able to get your prescriptions filled when you left the hospital?: Yes  Did you take your medications as prescribed?: Yes  Were you able to get to your follow-up appointments?: Yes  What post-acute resources were offered?: Lu Antunez  Patient was readmitted due to: COPD exacerbation  Action Plan: awaiting medical clearance and therapy recs    Patient Information  Admitted from[de-identified] Home  Mental Status: Alert  During Assessment patient was accompanied by: Not accompanied during assessment  Assessment information provided by[de-identified] Patient  Primary Caregiver: Self  Support Systems: Son  [place] of Residence: [address] [place] [address] do you live in?: Mixpanel entry access options   Select all that apply : Stairs  Number of steps to enter home  : 1  Do the steps have railings?: Yes  Type of Current Residence: [address] home  Upon entering residence, is there a bedroom on the main floor (no further steps)?: Yes  Upon entering residence, is there a bathroom on the main floor (no further steps)?: Yes  In the last 12 months, was there a time when you were not able to pay the mortgage or rent on time?: No  In the last 12 months, how many places have you lived?: 1  In the last 12 months, was there a time when you did not have a steady place to sleep or slept in a shelter (including now)?: No  Homeless/housing insecurity resource given?: N/A  Living Arrangements: Lives w/ Son  Is patient a ?: No    Activities of Daily Living Prior to Admission  Functional Status: Independent  Completes ADLs independently?: Yes  Ambulates independently?: Yes  Does patient currently own DME?: Yes  What DME does the patient currently own?: Home Oxygen concentrator, Portable Oxygen tanks, Walker, Other (Comment) (2 jeremiah pagan, soo)  Does patient have a history of Outpatient Therapy (PT/OT)?: Yes  Does the patient have a history of Short-Term Rehab?: Yes ([facility])  Does patient have a history of HHC?: Yes ([facility])  Does patient currently have [facility]?: No         Patient Information Continued  Income Source: Pension/senior living  Does patient have prescription coverage?: Yes  Within the past 12 months, you worried that your food would run out before you got the money to buy more : Never true  Within the past 12 months, the food you bought just didn't last and you didn't have money to get more : Never true  Food insecurity resource given?: N/A  Does patient receive dialysis treatments?: No  Does patient have a history of substance abuse?: No  Does patient have a history of Mental Health Diagnosis?: No         Means of Transportation  Means of Transport to Appts[de-identified] Drives Self  In the past 12 months, has lack of transportation kept you from medical appointments or from getting medications?: No  In the past 12 months, has lack of transportation kept you from meetings, work, or from getting things needed for daily living?: No  Was application for public transport provided?: N/A        DISCHARGE DETAILS:    Discharge planning discussed with[de-identified] Patient  Freedom of Choice: Yes  Comments - Freedom of Choice: Discussed FOC  CM contacted family/caregiver?: No- see comments (Declined)     Contacts  Patient Contacts: Mariano Fox  Relationship to Patient[de-identified] Family  Contact Method: Phone  Phone Number: [phone]  Reason/Outcome: Continuity of Care, Emergency Contact, Discharge Planning     CM reviewed d/c planning process including the following: identifying help at home, patient preference for d/c planning needs, Discharge Lounge, [facility] Meds to Bed program, availability of treatment team to discuss questions or concerns patient and/or family may have regarding understanding medications and recognizing signs and symptoms once discharged  CM also encouraged patient to follow up with all recommended appointments after discharge  Patient advised of importance for patient and family to participate in managing patient’s medical well being  Information obtained from patient, lives with son in 2 story home, 1 [address], has 1st floor set up  IADLS, retired, drives self  Multiple ER visits, hospitalizations  Patient reports he is taking medications at home as prescribed    Fully vaccinated for covid

## 2022-10-17 NOTE — ASSESSMENT & PLAN NOTE
Lab Results   Component Value Date    HGBA1C 9 7 (H) 10/10/2022       Recent Labs     10/15/22  1619 10/17/22  0034 10/17/22  0750 10/17/22  1142   POCGLU 129 270* 179* 457*       Blood Sugar Average: Last 72 hrs:  (P) 302   · Patient reporting labile blood sugars since discharge, as high as in the 300s and as low as 20  · Recently discharged on new dosing regimen of Humalog 75/25 - 50 units, 40 units, 30 units with breakfast lunch and dinner respectively  · Appreciate endocrinology consult and recommendations  · Carb controlled diet   · Hypoglycemia protocol

## 2022-10-17 NOTE — CONSULTS
Consultation - Christine Ceja Sr  78 y o  male MRN: [id number]    [facility] Encounter: [id number]      Assessment/Plan     Assessment: This is a 78y o -year-old male with Type 2 diabetes on long-term insulin complicated by diabetic nephropathy and CAD admitting had  for COPD exacerbation, currently on steroids  Plan:  # type 2 diabetes with uncontrolled hyperglycemia  # COPD on steroids  A1c: 9 7 (10/2022)  Home regimen:  40 units with breakfast and lunch, 35U with dinner  Recommendations:  Glucose uncontrolled on current regimen likely from concurrent high dose steroids for his COPD  If recheck glucose still >400, recommend starting on IV insulin for glycemic control  Otherwise will continue on current insulin regimen for now  Monitor Accu-Cheks and adjust insulin regimen as indicated    CC: Diabetes Consult    History of Present Illness     HPI: Christine Ceja Sr  is a 78y o  year old male with type 2 diabetes on long-term insulin complicated by CAD and diabetic nephropathy, tobacco abuse, COPD readmitted for COPD exacerbation  He was admitted here with similar complaints and presented within a few hours of discharge with acute onset of SOB  Was seen in consult on 10/14/2022 for assistance with diabetes management  Per chart review,  insulin regimen on discharge was 75/25 insulin 50 U before breakfast, 40 U with lunch and 30 U with dinner  however on encounter today patient reports he was told to continue on U500 insulin on discharge (was previously on U500 40 units with breakfast and lunch and 35 units with dinner, changed to Novolog 70/30 insulin regimen in 08/2022)  On admission, he has been restarted on steroids- got 2 doses of 40 mg oral prednisone at midnight and 9AM this morning, now switched to IV methylprednisolone 40 mg q12 (starting  Tonight)          Inpatient consult to Endocrinology  Consult performed by: Sarika Young MD  Consult ordered by: Joyce Mckeon MD        ROS  Constitutional: Negative for appetite change  Negative for activity change, fatigue and unexpected weight change  Respiratory: Shortness of breath (improving), wheezing, cough  Cardiovascular: Negative for chest pain and palpitations  Gastrointestinal: Negative for abdominal pain, nausea and vomiting  Negative for diarrhea or constipation  Musculoskeletal: Negative for arthralgias  Neurological: Negative for dizziness, light-headedness and headaches  All other ROS reviewed and negative    Historical Information   Past Medical History:   Diagnosis Date   • Abdominal pain    • MARANDA (acute kidney injury) ([facility] ) 6/19/2018   • Cardiac disease    • CHF (congestive heart failure) (HCC)    • COPD, severe (HCC)    • Coronary artery disease    • DDD (degenerative disc disease), lumbar 9/1/2020   • Diabetes mellitus (HCC)    • Dyspnea    • GERD (gastroesophageal reflux disease)    • Hyperlipidemia    • Hypertension    • MI (myocardial infarction) ([facility] )     with 3 stents   • Nodule of apex of right lung    • JACQUELINE (obstructive sleep apnea)    • Prostate cancer [facility])      Past Surgical History:   Procedure Laterality Date   • ABDOMINAL SURGERY      exploratory   • ANGIOPLASTY      3 stents   • APPENDECTOMY     • COLONOSCOPY  2015   • CT NEEDLE BIOPSY LUNG  11/3/2020   • ESOPHAGOGASTRODUODENOSCOPY N/A 10/2/2017    Procedure: ESOPHAGOGASTRODUODENOSCOPY (EGD); Surgeon: Philip Bridges MD;  Location: [facility];   Service: Gastroenterology   • IR THORACENTESIS  11/3/2020   • KNEE CARTILAGE SURGERY     • OTHER SURGICAL HISTORY      stent placement   • PROSTATE SURGERY     • SKIN GRAFT      Basal cell CA back     Social History   Social History     Substance and Sexual Activity   Alcohol Use Never     Social History     Substance and Sexual Activity   Drug Use No     Social History     Tobacco Use   Smoking Status Former Smoker   • Packs/day: 1 50   • Years: 50 00   • Pack years: 75 00   • Start date: 38   • Quit date: 2020   • Years since quittin 2   Smokeless Tobacco Never Used     Family History:   Family History   Problem Relation Age of Onset   • Heart disease Father    • Other Father         Mesothelioma        Meds/Allergies   Current Facility-Administered Medications   Medication Dose Route Frequency Provider Last Rate Last Admin   • acetaminophen (TYLENOL) tablet 650 mg  650 mg Oral Q6H PRN Malika Lawton MD       • aluminum-magnesium hydroxide-simethicone (MYLANTA) oral suspension 30 mL  30 mL Oral Q6H PRN Sebas Castanon MD       • apixaban (ELIQUIS) tablet 5 mg  5 mg Oral BID Malika Lawton MD   5 mg at 10/17/22 0041   • aspirin (ECOTRIN LOW STRENGTH) EC tablet 81 mg  81 mg Oral Daily Malika Lawton MD       • carvedilol (COREG) tablet 6 25 mg  6 25 mg Oral BID With Meals Malika Lawton MD       • cyclobenzaprine (FLEXERIL) tablet 10 mg  10 mg Oral BID PRN Malika Lawton MD   10 mg at 10/17/22 0041   • docusate sodium (COLACE) capsule 100 mg  100 mg Oral BID PRN Malika Lawton MD       • ferrous sulfate tablet 325 mg  325 mg Oral Daily With Breakfast Malika Lawton MD       • fluticasone-vilanterol (BREO ELLIPTA) 100-25 mcg/inh inhaler 1 puff  1 puff Inhalation Daily Malika Lawton MD       • furosemide (LASIX) tablet 40 mg  40 mg Oral Daily Malika Lawton MD       • gabapentin (NEURONTIN) capsule 100 mg  100 mg Oral TID Malika Lawton MD   100 mg at 10/17/22 0041   • guaiFENesin (MUCINEX) 12 hr tablet 600 mg  600 mg Oral BID Malika Lawton MD   600 mg at 10/17/22 0041   • insulin aspart protamine-insulin aspart (NovoLOG 70/30) 100 units/mL subcutaneous injection 30 Units  30 Units Subcutaneous Before Valentín Obrien MD       • insulin aspart protamine-insulin aspart (NovoLOG 70/30) 100 units/mL subcutaneous injection 40 Units  40 Units Subcutaneous Daily Before Lunch Malika Lawton MD       • insulin aspart protamine-insulin aspart (NovoLOG 70/30) 100 units/mL subcutaneous injection 50 Units  50 Units Subcutaneous Daily With Breakfast Sebas Lawton MD       • insulin aspart protamine-insulin aspart (NovoLOG 70/30) 100 units/mL subcutaneous injection 50 Units  50 Units Subcutaneous Daily Before Breakfast Sebas Lawton MD       • insulin lispro (HumaLOG) 100 units/mL subcutaneous injection 1-5 Units  1-5 Units Subcutaneous HS Mario Kim MD   2 Units at 10/17/22 0216   • insulin lispro (HumaLOG) 100 units/mL subcutaneous injection 1-6 Units  1-6 Units Subcutaneous TID AC Sebas Lawton MD       • levalbuterol Alexandra Favor) inhalation solution 1 25 mg  1 25 mg Nebulization TID Mario Kim MD   1 25 mg at 10/17/22 7942    And   • sodium chloride 0 9 % inhalation solution 3 mL  3 mL Nebulization TID Mario Kim MD   3 mL at 10/17/22 0826   • levalbuterol (Manuel Ogles) inhalation solution 1 25 mg  1 25 mg Nebulization Q6H PRN Maroi Kim MD        And   • sodium chloride 0 9 % inhalation solution 3 mL  3 mL Nebulization Q6H PRN Mario Kim MD       • lidocaine (LIDODERM) 5 % patch 1 patch  1 patch Topical Daily Mario Kim MD       • nicotine (NICODERM CQ) 7 mg/24hr TD 24 hr patch 7 mg  7 mg Transdermal Daily Mario Kim MD       • ondansetron (ZOFRAN) injection 4 mg  4 mg Intravenous Q6H PRN Mario Kim MD       • pantoprazole (PROTONIX) EC tablet 40 mg  40 mg Oral Early Morning Mario Kim MD       • potassium chloride (K-DUR,KLOR-CON) CR tablet 20 mEq  20 mEq Oral Daily Mario Kim MD       • pravastatin (PRAVACHOL) tablet 80 mg  80 mg Oral Daily With Simran White MD       • predniSONE tablet 40 mg  40 mg Oral Daily Mario Kim MD   40 mg at 10/17/22 0041   • tamsulosin (FLOMAX) capsule 0 4 mg  0 4 mg Oral Daily With Simran White MD       • umeclidinium bromide (INCRUSE ELLIPTA) 62 5 mcg/inh inhaler AEPB 1 puff  1 puff Inhalation Daily Sebas Laboy MD         Allergies   Allergen Reactions   • Crestor [Rosuvastatin] Other (See Comments)     Unknown     • Lisinopril GI Intolerance, Dizziness and Abdominal Pain   • Metformin GI Intolerance       Objective   Vitals: Blood pressure 130/65, pulse 66, temperature (!) 97 2 °F (36 2 °C), resp  rate 18, height 6' (1 829 m), weight 97 5 kg (214 lb 15 2 oz), SpO2 100 %  Intake/Output Summary (Last 24 hours) at 10/17/2022 0911  Last data filed at 10/17/2022 0501  Gross per 24 hour   Intake --   Output 200 ml   Net -200 ml     Invasive Devices  Report    Peripheral Intravenous Line  Duration           Peripheral IV 10/16/22 Left Forearm <1 day                Physical Exam  GEN: Awake and alert, not in acute distress  HEENT: MM moist  No scleral icterus  CV: RRR, nml S1/S2  Lungs: CTA bilaterally, no w/r/r  Abd: Soft, NT, ND  Skin: No rashes or lesions noted  Psych: Normal mood and affect    The history was obtained from the review of the chart, patient      Lab Results:   Results from last 7 days   Lab Units 10/10/22  0929   HEMOGLOBIN A1C % 9 7*     Lab Results   Component Value Date    WBC 14 13 (H) 10/16/2022    HGB 10 2 (L) 10/16/2022    HCT 32 3 (L) 10/16/2022    MCV 85 10/16/2022     10/16/2022     Lab Results   Component Value Date/Time    BUN 90 (H) 10/16/2022 08:32 PM    BUN 17 09/09/2015 05:30 AM     09/09/2015 05:30 AM    K 5 9 (H) 10/16/2022 08:32 PM    K 4 2 09/09/2015 05:30 AM     10/16/2022 08:32 PM     09/09/2015 05:30 AM    CO2 28 10/16/2022 08:32 PM    CO2 35 (H) 08/15/2022 10:01 PM    CREATININE 2 93 (H) 10/16/2022 08:32 PM    CREATININE 1 03 09/09/2015 05:30 AM    AST 25 10/16/2022 08:32 PM    AST 19 09/08/2015 02:00 PM    ALT 39 10/16/2022 08:32 PM    ALT 27 09/08/2015 02:00 PM    ALB 3 0 (L) 10/16/2022 08:32 PM    ALB 3 3 (L) 09/08/2015 02:00 PM     No results for input(s): CHOL, HDL, LDL, TRIG, VLDL in the last 72 hours  No results found for: [place]  POC Glucose (mg/dl)   Date Value   10/17/2022 179 (H)   10/17/2022 270 (H)   10/15/2022 129   10/15/2022 159 (H)   10/15/2022 224 (H)   10/15/2022 215 (H)   10/14/2022 182 (H)   10/14/2022 104   10/14/2022 368 (H)   10/14/2022 130       Imaging Studies: I have personally reviewed pertinent reports  Portions of the record may have been created with voice recognition software

## 2022-10-17 NOTE — ASSESSMENT & PLAN NOTE
· Patient was doing well at home currently on prednisone for 5 days last day being today  Suddenly with shortness of breath and here with hypoxia and respiratory acidosis  · Given nebulizations and currently is improved  · Will continue with prednisone for now 40 mg daily  · Pulmonary consult  · Continue with Breo/Incruse 100/25/62 5 daily  · Guaifenesin for expectoration 600mg twice daily  · Xopenex round-the-clock and p r n  Daniel Noelan · Noted patient has chronic right-sided lower lobe atelectasis with stable pleural effusion in recent imaging studies  This is seemingly stable with current chest x-ray  · Please note:  Patient is agreeable to stay for now despite the potential event of lack of beds for the night  However, I said to him that in the event that he needs to stay in the emergency room as a holding patient, we will change the current stretcher to a hospital bed

## 2022-10-17 NOTE — H&P
[address]  H&P- Joanna Padron   1945, 78 y o  male MRN: [id number]  Unit/Bed#: ED 13 Encounter: [id number]  Primary Care Provider: Annamarie Goncalves MD   Date and time admitted to hospital: 10/16/2022  8:22 PM    * COPD exacerbation [facility])  Assessment & Plan  · Patient was doing well at home currently on prednisone for 5 days last day being today  Suddenly with shortness of breath and here with hypoxia and respiratory acidosis  · Given nebulizations and currently is improved  · Will continue with prednisone for now 40 mg daily  · Pulmonary consult  · Continue with Breo/Incruse 100/25/62 5 daily  · Guaifenesin for expectoration 600mg twice daily  · Xopenex round-the-clock and p r n  Birtha Ariela · Noted patient has chronic right-sided lower lobe atelectasis with stable pleural effusion in recent imaging studies  This is seemingly stable with current chest x-ray  · Please note:  Patient is agreeable to stay for now despite the potential event of lack of beds for the night  However, I said to him that in the event that he needs to stay in the emergency room as a holding patient, we will change the current stretcher to a hospital bed  Acute on chronic respiratory failure with hypoxia [facility])  Assessment & Plan  Secondary to COPD exacerbation  Pulmonary consult  Respiratory protocol and support as noted above  Type 2 diabetes mellitus with hyperglycemia, with long-term current use of insulin [facility])  Assessment & Plan  Lab Results   Component Value Date    HGBA1C 9 7 (H) 10/10/2022       Recent Labs     10/15/22  0633 10/15/22  0802 10/15/22  1146 10/15/22  1619   POCGLU 215* 224* 159* 129       Blood Sugar Average: Last 72 hrs:   patient states that current blood sugars are still up and as high as in the 300s would go down suddenly to 20  He is on a new dosing regimen of 75/25 50 units, 40 units, 30 units at breakfast lunch and dinner respectively    As patient still has blood sugars all over the place and current use of prednisone; will place endocrinology consult  Chronic diastolic (congestive) heart failure (HCC)  Assessment & Plan  Wt Readings from Last 3 Encounters:   10/15/22 98 7 kg (217 lb 11 1 oz)   09/17/22 101 kg (222 lb)   09/02/22 107 kg (235 lb 8 3 oz)   Currently with out signs of failure although short of breath most likely secondary to COPD exacerbation  Continue Coreg and Lasix  A-fib (HCC)  Assessment & Plan  On Eliquis 5 mg daily  Stage 3b chronic kidney disease (CKD) [facility])  Assessment & Plan  Lab Results   Component Value Date    EGFR 19 10/16/2022    EGFR 22 10/15/2022    EGFR 21 10/14/2022    CREATININE 2 93 (H) 10/16/2022    CREATININE 2 66 (H) 10/15/2022    CREATININE 2 71 (H) 10/14/2022   Continue monitoring creatinine and GFR  CAD (coronary artery disease)  Assessment & Plan  Was worked up happened in May the and ejection fraction of 61%  Unremarkable  Essential hypertension  Assessment & Plan  Continue Coreg and Lasix  HLD (hyperlipidemia)  Assessment & Plan  On pravastatin 80 mg daily  VTE Prophylaxis: Apixaban (Eliquis)  / sequential compression device   Code Status: Level 1 - Full Code as discussed with patient  POLST: There is no POLST form on file for this patient (pre-hospital)    Anticipated Length of Stay:  Patient will be admitted on an Inpatient basis with an anticipated length of stay of  greater than 2 midnights  Justification for Hospital Stay: Please see detailed plans noted above  For treatment of COPD exacerbation despite being on steroids  Respiratory support and protocol  Will need pulmonary consult  Likewise, patient has hyperglycemia especially on prednisone and recent modification of insulin regimen  Will need endocrinology consult  Chief Complaint:     Hyperglycemia and shortness of breath    History of Present Illness:  Nikogaudencio Rossi Sr  is a 78 y o  male who has past medical history significant for severe COPD currently on inhalers and recently was admitted for exacerbation and was discharged on prednisone pulsed steroids for 5 days today being the last day  In addition, he has CAD although recent stress testing showed ejection fraction of 61% and with note of left ventricular perfusion defect medium size in basal to inferior location which is paradoxical   He also has hyperglycemia especially being on steroids  Recently, his insulin was modified as 75/25 50, 40, 30 units at breakfast lunch and dinner  He also has a history of active smoking however limits himself to 1 or 2 cigarettes per day  , hyperlipidemia and atrial fibrillation on Eliquis  Chronic kidney disease stage IIIB  Patient comes in with shortness of breath which occurred suddenly  Reportedly, he was going to check his blood sugars and could not find his test strips  He also reports that the blood sugars have been “all over the place” as high as in the 300s and suddenly with drop in the 20s  During this time that he was looking for his test strips, he suddenly developed shortness of breath  He was then brought to [facility] for further evaluation  Here, he received a 10 mg albuterol nebulization with Atrovent  He feels much better although there is still some abdominal breathing  Is able to speak in longer sentences  Currently, patient is lying on the stretcher with mild elevation and he is feeling much better as of the moment  Review of Systems:    Constitutional:  Denies fever or chills   Eyes:  Denies change in visual acuity   HENT:  Denies nasal congestion or sore throat   Respiratory:  Instantaneous shortness of breath    Cardiovascular:  Denies chest pain or edema   GI:  Denies abdominal pain, nausea, vomiting, bloody stools or diarrhea   :  Denies dysuria   Musculoskeletal:  Denies back pain or joint pain   Integument:  Denies rash   Neurologic:  Denies headache, focal weakness or sensory changes   Endocrine:  Denies polyuria or polydipsia   Psychiatric:  Denies depression or anxiety     Past Medical and Surgical History:   Past Medical History:   Diagnosis Date   • Abdominal pain    • MARANDA (acute kidney injury) ([facility] ) 6/19/2018   • Cardiac disease    • CHF (congestive heart failure) (HCC)    • COPD, severe (HCC)    • Coronary artery disease    • DDD (degenerative disc disease), lumbar 9/1/2020   • Diabetes mellitus (HCC)    • Dyspnea    • GERD (gastroesophageal reflux disease)    • Hyperlipidemia    • Hypertension    • MI (myocardial infarction) ([facility] )     with 3 stents   • Nodule of apex of right lung    • JACQUELINE (obstructive sleep apnea)    • Prostate cancer [facility])      Past Surgical History:   Procedure Laterality Date   • ABDOMINAL SURGERY      exploratory   • ANGIOPLASTY      3 stents   • APPENDECTOMY     • COLONOSCOPY  2015   • CT NEEDLE BIOPSY LUNG  11/3/2020   • ESOPHAGOGASTRODUODENOSCOPY N/A 10/2/2017    Procedure: ESOPHAGOGASTRODUODENOSCOPY (EGD); Surgeon: Jacklynn Goodell, MD;  Location: [facility]; Service: Gastroenterology   • IR THORACENTESIS  11/3/2020   • KNEE CARTILAGE SURGERY     • OTHER SURGICAL HISTORY      stent placement   • PROSTATE SURGERY     • SKIN GRAFT      Basal cell CA back       Meds/Allergies:  (Not in a hospital admission)      Allergies: Allergies   Allergen Reactions   • Crestor [Rosuvastatin] Other (See Comments)     Unknown     • Lisinopril GI Intolerance, Dizziness and Abdominal Pain   • Metformin GI Intolerance     History:  Marital Status:    Occupation:  He is retired  Patient Pre-hospital Living Situation:  Lives at home with his son who also works  His meals are care of meals on wheels  His son also does some of the cooking  Patient Pre-hospital Level of Mobility:  Ambulatory  Patient Pre-hospital Diet Restrictions:  Regular  Meals on wheels delivered  He says the meals on wheels give him diabetic meals    Substance Use History:   Social History Substance and Sexual Activity   Alcohol Use Never     Social History     Tobacco Use   Smoking Status Former Smoker   • Packs/day: 1 50   • Years: 50 00   • Pack years: 75 00   • Start date: 38   • Quit date: 2020   • Years since quittin 2   Smokeless Tobacco Never Used     Social History     Substance and Sexual Activity   Drug Use No       Family History:  Family History   Problem Relation Age of Onset   • Heart disease Father    • Other Father         Mesothelioma        Physical Exam:     Vitals:   Blood Pressure: (!) 200/90 (10/16/22 2027)  Pulse: 100 (10/16/22 2023)  Temperature: 98 °F (36 7 °C) (10/16/22 2135)  Temp Source: Oral (10/16/22 2135)  Respirations: (!) 24 (10/16/22 2023)  SpO2: 90 % (10/16/22 2023)    Constitutional:  Well developed, well nourished, mild distress, can speak in long sentences, no overt dyspnea on conversation but with abdominal breathing  Eyes:  PERRL, conjunctiva normal   HENT:  Atraumatic, external ears normal, nose normal, oropharynx moist, no pharyngeal exudates  Neck- normal range of motion, no tenderness, supple   Respiratory:  Mild distress with bronchial breath sounds, expiratory rhonchi  Wheezing  Cardiovascular:  Normal rate, normal rhythm, no murmurs, no gallops, no rubs   GI:  Soft, nondistended, normal bowel sounds, nontender, no organomegaly, no mass, no rebound, no guarding   :  No costovertebral angle tenderness   Musculoskeletal:  No edema, no tenderness, no deformities  Back- no tenderness  Integument:  Well hydrated, no rash   Lymphatic:  No lymphadenopathy noted   Neurologic:  Alert &awake, communicative, CN 2-12 normal, normal motor function, normal sensory function, no focal deficits noted   Psychiatric:  Speech and behavior appropriate       Lab Results: I have personally reviewed pertinent reports        Results from last 7 days   Lab Units 10/16/22  2032 10/11/22  0507 10/10/22  0929   WBC Thousand/uL 14 13*   < > 13 31*   HEMOGLOBIN g/dL 10 2*   < > 12 2   HEMATOCRIT % 32 3*   < > 35 4*   PLATELETS Thousands/uL 183   < > 112*   NEUTROS PCT %  --   --  85*   LYMPHS PCT %  --   --  6*   LYMPHO PCT % 17  --   --    MONOS PCT %  --   --  7   MONO PCT % 6  --   --    EOS PCT % 5  --  1    < > = values in this interval not displayed  Results from last 7 days   Lab Units 10/16/22  2032   POTASSIUM mmol/L 5 9*   CHLORIDE mmol/L 104   CO2 mmol/L 28   BUN mg/dL 90*   CREATININE mg/dL 2 93*   CALCIUM mg/dL 8 2*   ALK PHOS U/L 67   ALT U/L 39   AST U/L 25             Imaging: I have personally reviewed pertinent reports  XR chest 1 view portable    Result Date: 10/10/2022  Narrative: CHEST INDICATION:   sob  COMPARISON:  CXR 09/17/2022 and 8/29/2022, chest CT from 9/17/2022  EXAM PERFORMED/VIEWS:  XR CHEST PORTABLE FINDINGS: Cardiomediastinal silhouette appears unremarkable  No acute disease  Scar in the right upper lung with rounded atelectasis in the right base and persistent small loculated right effusion  Osseous structures appear within normal limits for patient age  Impression: No acute cardiopulmonary disease  Redemonstration of scar in the right upper lung, right base rounded atelectasis, and small loculated right effusion  Workstation performed: [id number]     XR chest 2 views    Result Date: 9/18/2022  Narrative: CHEST INDICATION:   history of CHF, chronic resp failure and R sided pleural effusion and chest pain  COMPARISON:  CXR 8/29/2022, chest CT 9/17/2022  EXAM PERFORMED/VIEWS:  XR CHEST AP & LATERAL FINDINGS: Cardiomediastinal silhouette appears unremarkable  Stable moderate right pleural effusion and rounded atelectasis in the right base  Left lung clear  No pneumothorax  Osseous structures appear within normal limits for patient age  Impression: No definite acute disease with right base opacity corresponding with loculated right effusion and right base rounded atelectasis on CT   Workstation performed: [id number]     CT chest wo contrast    Result Date: 9/17/2022  Narrative: CT CHEST WITHOUT IV CONTRAST INDICATION:   Chest wall pain Chest pain, known R sided pleural effusion, evaluate  COMPARISON:  CT 8/16/22, 3/29/22 TECHNIQUE: CT examination of the chest was performed without intravenous contrast  Axial, sagittal, and coronal 2D reformatted images were created from the source data and submitted for interpretation  Radiation dose length product (DLP) for this visit:  554 mGy-cm   This examination, like all CT scans performed in the [facility], was performed utilizing techniques to minimize radiation dose exposure, including the use of iterative reconstruction and automated exposure control  FINDINGS: LUNGS: Peripheral rounded abnormality in the right base, appears similar to prior  Debris within the right basal segments airways, also present on the most recent prior Right upper lobe scarring Moderate emphysema Right hemithorax volume loss Right upper lobectomy PLEURA:  Small right pleural effusion with peripheral pleural thickening HEART/GREAT VESSELS: Heart is unremarkable for patient's age  No thoracic aortic aneurysm  MEDIASTINUM AND JEREMÍAS:  Unremarkable  CHEST WALL AND LOWER NECK:  Unremarkable  VISUALIZED STRUCTURES IN THE UPPER ABDOMEN:  Unremarkable  OSSEOUS STRUCTURES:  Old right rib fractures Degenerative spinal changes     Impression: 1  Right basal airway debris, mucous secretions versus aspiration  No new airspace infiltrate  2   Right lower lobe round atelectasis with stable pleural effusion and pleural thickening Workstation performed: [id number]         ** Please Note: Dragon 360 Dictation voice to text software was used in the creation of this document   **

## 2022-10-18 ENCOUNTER — TELEPHONE (OUTPATIENT)
Dept: NEPHROLOGY | Facility: CLINIC | Age: 77
End: 2022-10-18

## 2022-10-18 PROBLEM — D64.9 ANEMIA: Status: ACTIVE | Noted: 2022-10-18

## 2022-10-18 LAB
ANION GAP SERPL CALCULATED.3IONS-SCNC: 3 MMOL/L (ref 4–13)
ANION GAP SERPL CALCULATED.3IONS-SCNC: 9 MMOL/L (ref 4–13)
ATRIAL RATE: 109 BPM
ATRIAL RATE: 63 BPM
ATRIAL RATE: 66 BPM
BUN SERPL-MCNC: 86 MG/DL (ref 5–25)
BUN SERPL-MCNC: 90 MG/DL (ref 5–25)
CALCIUM SERPL-MCNC: 8.4 MG/DL (ref 8.3–10.1)
CALCIUM SERPL-MCNC: 8.5 MG/DL (ref 8.3–10.1)
CHLORIDE SERPL-SCNC: 102 MMOL/L (ref 96–108)
CHLORIDE SERPL-SCNC: 103 MMOL/L (ref 96–108)
CO2 SERPL-SCNC: 25 MMOL/L (ref 21–32)
CO2 SERPL-SCNC: 29 MMOL/L (ref 21–32)
CREAT SERPL-MCNC: 2.68 MG/DL (ref 0.6–1.3)
CREAT SERPL-MCNC: 2.78 MG/DL (ref 0.6–1.3)
ERYTHROCYTE [DISTWIDTH] IN BLOOD BY AUTOMATED COUNT: 15.2 % (ref 11.6–15.1)
FERRITIN SERPL-MCNC: 62 NG/ML (ref 8–388)
FOLATE SERPL-MCNC: 7.2 NG/ML (ref 3.1–17.5)
GFR SERPL CREATININE-BSD FRML MDRD: 21 ML/MIN/1.73SQ M
GFR SERPL CREATININE-BSD FRML MDRD: 22 ML/MIN/1.73SQ M
GLUCOSE SERPL-MCNC: 140 MG/DL (ref 65–140)
GLUCOSE SERPL-MCNC: 174 MG/DL (ref 65–140)
GLUCOSE SERPL-MCNC: 188 MG/DL (ref 65–140)
GLUCOSE SERPL-MCNC: 221 MG/DL (ref 65–140)
GLUCOSE SERPL-MCNC: 227 MG/DL (ref 65–140)
GLUCOSE SERPL-MCNC: 279 MG/DL (ref 65–140)
GLUCOSE SERPL-MCNC: 394 MG/DL (ref 65–140)
HCT VFR BLD AUTO: 24.9 % (ref 36.5–49.3)
HGB BLD-MCNC: 7.9 G/DL (ref 12–17)
IRON SATN MFR SERPL: 23 % (ref 20–50)
IRON SERPL-MCNC: 77 UG/DL (ref 65–175)
MCH RBC QN AUTO: 27.5 PG (ref 26.8–34.3)
MCHC RBC AUTO-ENTMCNC: 31.7 G/DL (ref 31.4–37.4)
MCV RBC AUTO: 87 FL (ref 82–98)
PLATELET # BLD AUTO: 117 THOUSANDS/UL (ref 149–390)
PMV BLD AUTO: 10.7 FL (ref 8.9–12.7)
POTASSIUM SERPL-SCNC: 5.5 MMOL/L (ref 3.5–5.3)
POTASSIUM SERPL-SCNC: 5.7 MMOL/L (ref 3.5–5.3)
PR INTERVAL: 328 MS
PR INTERVAL: 328 MS
QRS AXIS: 109 DEGREES
QRS AXIS: 140 DEGREES
QRS AXIS: 151 DEGREES
QRSD INTERVAL: 130 MS
QRSD INTERVAL: 140 MS
QRSD INTERVAL: 142 MS
QT INTERVAL: 382 MS
QT INTERVAL: 432 MS
QT INTERVAL: 432 MS
QTC INTERVAL: 442 MS
QTC INTERVAL: 452 MS
QTC INTERVAL: 467 MS
RBC # BLD AUTO: 2.87 MILLION/UL (ref 3.88–5.62)
SODIUM SERPL-SCNC: 135 MMOL/L (ref 135–147)
SODIUM SERPL-SCNC: 136 MMOL/L (ref 135–147)
T WAVE AXIS: 49 DEGREES
T WAVE AXIS: 57 DEGREES
T WAVE AXIS: 57 DEGREES
TIBC SERPL-MCNC: 341 UG/DL (ref 250–450)
VENTRICULAR RATE: 63 BPM
VENTRICULAR RATE: 66 BPM
VENTRICULAR RATE: 90 BPM
VIT B12 SERPL-MCNC: 359 PG/ML (ref 100–900)
WBC # BLD AUTO: 8.81 THOUSAND/UL (ref 4.31–10.16)

## 2022-10-18 PROCEDURE — 99223 1ST HOSP IP/OBS HIGH 75: CPT | Performed by: INTERNAL MEDICINE

## 2022-10-18 PROCEDURE — 94760 N-INVAS EAR/PLS OXIMETRY 1: CPT

## 2022-10-18 PROCEDURE — 80048 BASIC METABOLIC PNL TOTAL CA: CPT | Performed by: PHYSICIAN ASSISTANT

## 2022-10-18 PROCEDURE — 83540 ASSAY OF IRON: CPT | Performed by: PHYSICIAN ASSISTANT

## 2022-10-18 PROCEDURE — 85027 COMPLETE CBC AUTOMATED: CPT | Performed by: PHYSICIAN ASSISTANT

## 2022-10-18 PROCEDURE — 82607 VITAMIN B-12: CPT | Performed by: PHYSICIAN ASSISTANT

## 2022-10-18 PROCEDURE — 93010 ELECTROCARDIOGRAM REPORT: CPT | Performed by: INTERNAL MEDICINE

## 2022-10-18 PROCEDURE — 83550 IRON BINDING TEST: CPT | Performed by: PHYSICIAN ASSISTANT

## 2022-10-18 PROCEDURE — 99232 SBSQ HOSP IP/OBS MODERATE 35: CPT | Performed by: PHYSICIAN ASSISTANT

## 2022-10-18 PROCEDURE — 82728 ASSAY OF FERRITIN: CPT | Performed by: PHYSICIAN ASSISTANT

## 2022-10-18 PROCEDURE — 82746 ASSAY OF FOLIC ACID SERUM: CPT | Performed by: PHYSICIAN ASSISTANT

## 2022-10-18 PROCEDURE — 82948 REAGENT STRIP/BLOOD GLUCOSE: CPT

## 2022-10-18 PROCEDURE — 94640 AIRWAY INHALATION TREATMENT: CPT

## 2022-10-18 RX ORDER — CARVEDILOL 6.25 MG/1
6.25 TABLET ORAL 2 TIMES DAILY WITH MEALS
Status: DISCONTINUED | OUTPATIENT
Start: 2022-10-19 | End: 2022-10-24 | Stop reason: HOSPADM

## 2022-10-18 RX ORDER — HYDRALAZINE HYDROCHLORIDE 20 MG/ML
5 INJECTION INTRAMUSCULAR; INTRAVENOUS EVERY 6 HOURS PRN
Status: DISCONTINUED | OUTPATIENT
Start: 2022-10-18 | End: 2022-10-24 | Stop reason: HOSPADM

## 2022-10-18 RX ORDER — OXYMETAZOLINE HYDROCHLORIDE 0.05 G/100ML
2 SPRAY NASAL EVERY 12 HOURS SCHEDULED
Status: DISPENSED | OUTPATIENT
Start: 2022-10-18 | End: 2022-10-20

## 2022-10-18 RX ORDER — SODIUM POLYSTYRENE SULFONATE 4.1 MEQ/G
15 POWDER, FOR SUSPENSION ORAL; RECTAL ONCE
Status: COMPLETED | OUTPATIENT
Start: 2022-10-18 | End: 2022-10-18

## 2022-10-18 RX ORDER — ECHINACEA PURPUREA EXTRACT 125 MG
1 TABLET ORAL 4 TIMES DAILY
Status: DISCONTINUED | OUTPATIENT
Start: 2022-10-18 | End: 2022-10-24 | Stop reason: HOSPADM

## 2022-10-18 RX ORDER — PREDNISONE 20 MG/1
40 TABLET ORAL DAILY
Status: COMPLETED | OUTPATIENT
Start: 2022-10-18 | End: 2022-10-19

## 2022-10-18 RX ORDER — ISOSORBIDE MONONITRATE 30 MG/1
30 TABLET, EXTENDED RELEASE ORAL DAILY
Status: DISCONTINUED | OUTPATIENT
Start: 2022-10-19 | End: 2022-10-24 | Stop reason: HOSPADM

## 2022-10-18 RX ADMIN — INSULIN ASPART 30 UNITS: 100 INJECTION, SUSPENSION SUBCUTANEOUS at 17:21

## 2022-10-18 RX ADMIN — LEVALBUTEROL HYDROCHLORIDE 1.25 MG: 1.25 SOLUTION, CONCENTRATE RESPIRATORY (INHALATION) at 19:01

## 2022-10-18 RX ADMIN — FLUTICASONE FUROATE AND VILANTEROL TRIFENATATE 1 PUFF: 100; 25 POWDER RESPIRATORY (INHALATION) at 09:02

## 2022-10-18 RX ADMIN — ISODIUM CHLORIDE 3 ML: 0.03 SOLUTION RESPIRATORY (INHALATION) at 14:18

## 2022-10-18 RX ADMIN — LIDOCAINE 5% 1 PATCH: 700 PATCH TOPICAL at 08:58

## 2022-10-18 RX ADMIN — APIXABAN 5 MG: 5 TABLET, FILM COATED ORAL at 08:58

## 2022-10-18 RX ADMIN — INSULIN LISPRO 1 UNITS: 100 INJECTION, SOLUTION INTRAVENOUS; SUBCUTANEOUS at 22:13

## 2022-10-18 RX ADMIN — TAMSULOSIN HYDROCHLORIDE 0.4 MG: 0.4 CAPSULE ORAL at 17:21

## 2022-10-18 RX ADMIN — MUPIROCIN: 20 OINTMENT TOPICAL at 12:02

## 2022-10-18 RX ADMIN — MUPIROCIN: 20 OINTMENT TOPICAL at 17:22

## 2022-10-18 RX ADMIN — PREDNISONE 40 MG: 20 TABLET ORAL at 09:04

## 2022-10-18 RX ADMIN — UMECLIDINIUM 1 PUFF: 62.5 AEROSOL, POWDER ORAL at 09:02

## 2022-10-18 RX ADMIN — FERROUS SULFATE TAB 325 MG (65 MG ELEMENTAL FE) 325 MG: 325 (65 FE) TAB at 08:58

## 2022-10-18 RX ADMIN — GABAPENTIN 100 MG: 100 CAPSULE ORAL at 08:58

## 2022-10-18 RX ADMIN — ISODIUM CHLORIDE 3 ML: 0.03 SOLUTION RESPIRATORY (INHALATION) at 07:42

## 2022-10-18 RX ADMIN — OXYMETAZOLINE HYDROCHLORIDE 2 SPRAY: 0.05 SPRAY NASAL at 12:03

## 2022-10-18 RX ADMIN — ASPIRIN 81 MG: 81 TABLET, COATED ORAL at 08:58

## 2022-10-18 RX ADMIN — INSULIN LISPRO 2 UNITS: 100 INJECTION, SOLUTION INTRAVENOUS; SUBCUTANEOUS at 08:57

## 2022-10-18 RX ADMIN — INSULIN LISPRO 6 UNITS: 100 INJECTION, SOLUTION INTRAVENOUS; SUBCUTANEOUS at 12:01

## 2022-10-18 RX ADMIN — CALCIUM GLUCONATE 1 G: 20 INJECTION, SOLUTION INTRAVENOUS at 00:15

## 2022-10-18 RX ADMIN — NICOTINE 7 MG: 7 PATCH, EXTENDED RELEASE TRANSDERMAL at 08:58

## 2022-10-18 RX ADMIN — PRAVASTATIN SODIUM 80 MG: 80 TABLET ORAL at 17:21

## 2022-10-18 RX ADMIN — GUAIFENESIN 600 MG: 600 TABLET, EXTENDED RELEASE ORAL at 08:58

## 2022-10-18 RX ADMIN — GABAPENTIN 100 MG: 100 CAPSULE ORAL at 17:22

## 2022-10-18 RX ADMIN — SALINE NASAL SPRAY 1 SPRAY: 1.5 SOLUTION NASAL at 12:02

## 2022-10-18 RX ADMIN — INSULIN ASPART 40 UNITS: 100 INJECTION, SUSPENSION SUBCUTANEOUS at 11:59

## 2022-10-18 RX ADMIN — GABAPENTIN 100 MG: 100 CAPSULE ORAL at 22:07

## 2022-10-18 RX ADMIN — ISODIUM CHLORIDE 3 ML: 0.03 SOLUTION RESPIRATORY (INHALATION) at 19:01

## 2022-10-18 RX ADMIN — LEVALBUTEROL HYDROCHLORIDE 1.25 MG: 1.25 SOLUTION, CONCENTRATE RESPIRATORY (INHALATION) at 14:18

## 2022-10-18 RX ADMIN — GUAIFENESIN 600 MG: 600 TABLET, EXTENDED RELEASE ORAL at 17:21

## 2022-10-18 RX ADMIN — LEVALBUTEROL HYDROCHLORIDE 1.25 MG: 1.25 SOLUTION, CONCENTRATE RESPIRATORY (INHALATION) at 07:42

## 2022-10-18 RX ADMIN — SALINE NASAL SPRAY 1 SPRAY: 1.5 SOLUTION NASAL at 17:21

## 2022-10-18 RX ADMIN — PANTOPRAZOLE SODIUM 40 MG: 40 TABLET, DELAYED RELEASE ORAL at 05:21

## 2022-10-18 RX ADMIN — FUROSEMIDE 40 MG: 40 TABLET ORAL at 08:58

## 2022-10-18 RX ADMIN — INSULIN ASPART 50 UNITS: 100 INJECTION, SUSPENSION SUBCUTANEOUS at 08:57

## 2022-10-18 RX ADMIN — SODIUM POLYSTYRENE SULFONATE 15 G: 4.1 POWDER, FOR SUSPENSION ORAL; RECTAL at 10:24

## 2022-10-18 NOTE — ASSESSMENT & PLAN NOTE
Lab Results   Component Value Date    HGBA1C 9 7 (H) 10/10/2022       Recent Labs     10/17/22  0034 10/17/22  0750 10/17/22  1142 10/17/22  1636   POCGLU 270* 179* 457* 264*     Hx of poor control  · Will discharge on Endocrine recommendations for insulin

## 2022-10-18 NOTE — ASSESSMENT & PLAN NOTE
Recent admission for COPD exacerbation, discharge on 10/15 with 5 days prednisone  Re-presents due to acute onset shortness of breath  On admission found to by hypoxic with respiratory acidosis  · CXR on admission: Stable appearance of volume loss and chronic opacities in the right   · Continued on prednisone 40 mg daily on admission -- continue through 10/19   · Continue with Breo/Incruse 100/25/62 5 daily  · Guaifenesin for expectoration 600mg twice daily  · Xopenex round-the-clock and p r n  Elnor    · Pulm recs appreciated, do not suspect exacerbation but do recommend he complete prednisone through 10/19

## 2022-10-18 NOTE — ASSESSMENT & PLAN NOTE
Presenting with complains of SOB  At baseline uses 2 L NC as needed however was acutely hypoxic on admission requirring 3-4 L NC   · VBG on admission: pH 7 23, CO2 69, O2 52, HCO3 28  · Currently, on room air with SpO2 low 90s at rest   · Monitor and titrate supplemental oxygen as needed   · Pulmonology input appreciated, do not suspect recurrent COPD exacerbation    Continue short course of PO Prednisone through 10/19  · Respiratory protocol

## 2022-10-18 NOTE — PROGRESS NOTES
[address] [facility]  Progress Note - Deborha Reasons Sr  1945, 78 y o  male MRN: [id number]  Unit/Bed#: [facility] 627-01 Encounter: [id number]  Primary Care Provider: Harman Holt MD   Date and time admitted to hospital: 10/16/2022  8:22 PM    * Acute on chronic respiratory failure with hypoxia and hypercapnia [facility])  Assessment & Plan  Presenting with complains of SOB  At baseline uses 2 L NC as needed however was acutely hypoxic on admission requirring 3-4 L NC   · VBG on admission: pH 7 23, CO2 69, O2 52, HCO3 28  · Currently, on room air with SpO2 low 90s at rest   · Monitor and titrate supplemental oxygen as needed   · Pulmonology input appreciated, do not suspect recurrent COPD exacerbation  Continue short course of PO Prednisone through 10/19  · Respiratory protocol     Anemia  Assessment & Plan  · Noted, appears baseline Hgb has been around 12    Hgb has been slowly trending down and today 7 9   · Recently started on Eliquis this month with subsequent drop in Hgb  · Check iron panel, B12, folate, FOBT   · Pt reports intermittent episodes of epistaxis -- consult ENT for evaluation  · Holding Eliquis for now   · If ENT eval is unremarkable likely will need GI eval     Hyperkalemia  Assessment & Plan  Persistent hyperkalemia in high 5s despite hyperkalemia cocktail with albuterol, lasix/fluids, kayexalate   · Continue low potassium diet  · Irean Lewandowsky was discontinued   · Likely due in part to hyperglycemia   · Kayexalate again today   · Monitor BMP   · Nephrology eval     A-fib [facility])  Assessment & Plan  · On AC with Eliquis  -- hold for now given acute anemia/epistaxis   · Continue home dose Coreg     Stage 3b chronic kidney disease (CKD) [facility])  Assessment & Plan  Lab Results   Component Value Date    EGFR 21 10/18/2022    EGFR 22 10/18/2022    EGFR 21 10/17/2022    CREATININE 2 78 (H) 10/18/2022    CREATININE 2 68 (H) 10/18/2022    CREATININE 2 78 (H) 10/17/2022     Evaluated by nephrology on recent admission, recent baseline has been in the low 2s and was discharged with creatinine of 2 6  · Creatinine is currently stable in mid-2s at this time   · Continue home dose diuretics   · Avoid nephrotoxins and hypotension   · Monitor BMP     CAD (coronary artery disease)  Assessment & Plan  Status post prior stenting, presenting with SOB as above but denies chest pain   · NM stress test in May 2022 unremarkable   · Troponin trend negative and EKG without ischemic changes   · Continue aspirin, statin, BB     Chronic diastolic (congestive) heart failure (HCC)  Assessment & Plan  Wt Readings from Last 3 Encounters:   10/17/22 97 5 kg (214 lb 15 2 oz)   10/15/22 98 7 kg (217 lb 11 1 oz)   09/17/22 101 kg (222 lb)   · Currently examines euvolemic   · Most recent echo with LVEF 60%  · Continue home dose Coreg and Lasix  · Monitor volume status     Essential hypertension  Assessment & Plan  · Hypertensive urgency on admission, now improved   · Continue home dose Coreg and Lasix  · Monitor routinely     Type 2 diabetes mellitus with hyperglycemia, with long-term current use of insulin [facility])  Assessment & Plan  Lab Results   Component Value Date    HGBA1C 9 7 (H) 10/10/2022       Recent Labs     10/17/22  1636 10/17/22  2054 10/17/22  2317 10/18/22  0807   POCGLU 264* 237* 183* 227*       Blood Sugar Average: Last 72 hrs:  (P) 259 [id number]   · Patient reporting labile blood sugars since discharge, as high as in the 300s and as low as 20  · Recently discharged on new dosing regimen of Humalog 75/25 - 50 units, 40 units, 30 units with breakfast lunch and dinner respectively  · Appreciate endocrinology consult and recommendations  · Carb controlled diet   · Hypoglycemia protocol     HLD (hyperlipidemia)  Assessment & Plan  · On pravastatin 80 mg daily    Possible COPD exacerbation ([facility] [address] )  Assessment & Plan  Recent admission for COPD exacerbation, discharge on 10/15 with 5 days prednisone   Re-presents due to acute onset shortness of breath  On admission found to by hypoxic with respiratory acidosis  · CXR on admission: Stable appearance of volume loss and chronic opacities in the right   · Continued on prednisone 40 mg daily on admission -- continue through 10/19   · Continue with Breo/Incruse 100/25/62 5 daily  · Guaifenesin for expectoration 600mg twice daily  · Xopenex round-the-clock and p r n  Elnor  · Pulm recs appreciated, do not suspect exacerbation but do recommend he complete prednisone through 10/19       VTE Pharmacologic Prophylaxis: VTE Score: 7 High Risk (Score >/= 5) - Pharmacological DVT Prophylaxis Ordered: apixaban (Eliquis)  Sequential Compression Devices Ordered  Patient Centered Rounds: I performed bedside rounds with nursing staff today  Discussions with Specialists or Other Care Team Provider: TT ENT, cardiology, pulm     Education and Discussions with Family / Patient: Attempted to update  (son) via phone  Unable to contact  Time Spent for Care: 30 minutes  More than 50% of total time spent on counseling and coordination of care as described above  Current Length of Stay: 2 day(s)  Current Patient Status: Inpatient   Certification Statement: The patient will continue to require additional inpatient hospital stay due to acute anemia requiring work up, cardiology eval, ENT eval  Discharge Plan: Anticipate discharge in 24-48 hrs to home  Code Status: Level 1 - Full Code    Subjective:   Patient reports SOB is improved and denies CP today  He reports that he has had intermittent nose bleeds since being put on Eliquis    Denies any other overt bleeding, reports stool is brown in color     Objective:     Vitals:   Temp (24hrs), Av 2 °F (36 8 °C), Min:97 8 °F (36 6 °C), Max:98 5 °F (36 9 °C)    Temp:  [97 8 °F (36 6 °C)-98 5 °F (36 9 °C)] 98 2 °F (36 8 °C)  HR:  [54-78] 70  Resp:  [16-18] 16  BP: (109-141)/(44-59) 135/51  SpO2:  [90 %-99 %] 94 %  Body mass index is 29 15 kg/m²  Input and Output Summary (last 24 hours): Intake/Output Summary (Last 24 hours) at 10/18/2022 1236  Last data filed at 10/18/2022 1211  Gross per 24 hour   Intake 730 ml   Output 3725 ml   Net -2995 ml       Physical Exam:   Physical Exam  Vitals reviewed  Constitutional:       General: He is not in acute distress  Appearance: He is not toxic-appearing  HENT:      Head: Normocephalic and atraumatic  Eyes:      Extraocular Movements: Extraocular movements intact  Cardiovascular:      Rate and Rhythm: Normal rate and regular rhythm  Pulmonary:      Effort: Pulmonary effort is normal  No respiratory distress  Comments: Decreased BS b/l  Abdominal:      General: Bowel sounds are normal  There is no distension  Palpations: Abdomen is soft  Tenderness: There is no abdominal tenderness  Musculoskeletal:         General: Swelling present  Normal range of motion  Cervical back: Normal range of motion  Neurological:      General: No focal deficit present  Mental Status: He is alert and oriented to person, place, and time  Psychiatric:         Mood and Affect: Mood normal          Behavior: Behavior normal          Thought Content:  Thought content normal          Judgment: Judgment normal           Additional Data:     Labs:  Results from last 7 days   Lab Units 10/18/22  0157 10/17/22  1003 10/16/22  2032   WBC Thousand/uL 8 81 12 10* 14 13*   HEMOGLOBIN g/dL 7 9* 9 9* 10 2*   HEMATOCRIT % 24 9* 32 8* 32 3*   PLATELETS Thousands/uL 117* 171 183   BANDS PCT %  --   --  1   NEUTROS PCT %  --  89*  --    LYMPHS PCT %  --  6*  --    LYMPHO PCT %  --   --  17   MONOS PCT %  --  3*  --    MONO PCT %  --   --  6   EOS PCT %  --  0 5     Results from last 7 days   Lab Units 10/18/22  0847 10/17/22  1504 10/17/22  1003   SODIUM mmol/L 136   < > 137   POTASSIUM mmol/L 5 5*   < > 5 7*   CHLORIDE mmol/L 102   < > 105   CO2 mmol/L 25   < > 25   BUN mg/dL 86*   < > 78* CREATININE mg/dL 2 78*   < > 2 61*   ANION GAP mmol/L 9   < > 7   CALCIUM mg/dL 8 5   < > 8 5   ALBUMIN g/dL  --   --  2 9*   TOTAL BILIRUBIN mg/dL  --   --  0 27   ALK PHOS U/L  --   --  62   ALT U/L  --   --  30   AST U/L  --   --  10   GLUCOSE RANDOM mg/dL 279*   < > 386*    < > = values in this interval not displayed  Results from last 7 days   Lab Units 10/18/22  1108 10/18/22  0807 10/17/22  2317 10/17/22  2054 10/17/22  1636 10/17/22  1142 10/17/22  0750 10/17/22  0034 10/15/22  1619 10/15/22  1146 10/15/22  0802 10/15/22  0633   POC GLUCOSE mg/dl 394* 227* 183* 237* 264* 457* 179* 270* 129 159* 224* 215*               Lines/Drains:  Invasive Devices  Report    Peripheral Intravenous Line  Duration           Peripheral IV 10/16/22 Left Forearm 1 day                  Telemetry:  Telemetry Orders (From admission, onward)             24 Hour Telemetry Monitoring  Continuous x 24 Hours (Telem)        References:    Telemetry Guidelines   Question:  Reason for 24 Hour Telemetry  Answer:  Metabolic/Electrolyte Disturbance with High Probability of Dysrhythmia (K level <3 or >6, or KCL infusion >=10mEq/hr)                 Telemetry Reviewed: Normal Sinus Rhythm  Indication for Continued Telemetry Use: Arrthymias requiring medical therapy             Imaging: No pertinent imaging reviewed      Recent Cultures (last 7 days):         Last 24 Hours Medication List:   Current Facility-Administered Medications   Medication Dose Route Frequency Provider Last Rate   • acetaminophen  650 mg Oral Q6H PRN Bel Nix MD     • albuterol  2 5 mg Nebulization Q4H PRN Brand Paget, MD     • aspirin  81 mg Oral Daily Bel Nix MD     • [START ON 10/19/2022] carvedilol  6 25 mg Oral BID With Meals Brianna Cantrell PA-C     • cyclobenzaprine  10 mg Oral BID PRN Bel Nix MD     • ferrous sulfate  325 mg Oral Daily With Breakfast Bel Nix MD     • fluticasone-vilanterol  1 puff Inhalation Daily Ailin Christy MD     • furosemide  40 mg Oral Daily Ailin Christy MD     • gabapentin  100 mg Oral TID Ailin Christy MD     • guaiFENesin  600 mg Oral BID Ailin Christy MD     • hydrALAZINE  5 mg Intravenous Q6H PRN Harmeet Sawyer PA-C     • insulin aspart protamine-insulin aspart  30 Units Subcutaneous Before Rajinder Perez MD     • insulin aspart protamine-insulin aspart  40 Units Subcutaneous Daily Before Lunch Ailin Christy MD     • insulin aspart protamine-insulin aspart  50 Units Subcutaneous Daily With Breakfast Ailin Christy MD     • insulin lispro  1-5 Units Subcutaneous HS Ailin Christy MD     • insulin lispro  1-6 Units Subcutaneous TID AC Ailin Christy MD     • levalbuterol  1 25 mg Nebulization TID Ailin Christy MD      And   • sodium chloride  3 mL Nebulization TID Ailin Christy MD     • lidocaine  1 patch Topical Daily Ailin Christy MD     • mupirocin   Topical BID Juan Fragoso MD     • nicotine  7 mg Transdermal Daily Ailin Christy MD     • oxymetazoline  2 spray Each Nare Q12H [address] Marcia Workman PA-C     • pantoprazole  40 mg Oral Early Morning Ailin Christy MD     • pravastatin  80 mg Oral Daily With Rajinder Perez MD     • predniSONE  40 mg Oral Daily Margarita Huang MD     • sodium chloride  1 spray Each Nare 4x Daily Jaun Fragoso MD     • tamsulosin  0 4 mg Oral Daily With Rajinder Perez MD     • umeclidinium bromide  1 puff Inhalation Daily Ailin Christy MD          Today, Patient Was Seen By: Julio César Kirby    **Please Note: This note may have been constructed using a voice recognition system  **

## 2022-10-18 NOTE — PLAN OF CARE
Problem: Potential for Falls  Goal: Patient will remain free of falls  Description: INTERVENTIONS:  - Educate patient/family on patient safety including physical limitations  - Instruct patient to call for assistance with activity   - Consult OT/PT to assist with strengthening/mobility   - Keep Call bell within reach  - Keep bed low and locked with side rails adjusted as appropriate  - Keep care items and personal belongings within reach  - Initiate and maintain comfort rounds  - Make Fall Risk Sign visible to staff  - Apply yellow socks and bracelet for high fall risk patients  - Consider moving patient to room near nurses station  Outcome: Progressing     Problem: PAIN - ADULT  Goal: Verbalizes/displays adequate comfort level or baseline comfort level  Description: Interventions:  - Encourage patient to monitor pain and request assistance  - Assess pain using appropriate pain scale  - Administer analgesics based on type and severity of pain and evaluate response  - Implement non-pharmacological measures as appropriate and evaluate response  - Consider cultural and social influences on pain and pain management  - Notify physician/advanced practitioner if interventions unsuccessful or patient reports new pain  Outcome: Progressing     Problem: INFECTION - ADULT  Goal: Absence or prevention of progression during hospitalization  Description: INTERVENTIONS:  - Assess and monitor for signs and symptoms of infection  - Monitor lab/diagnostic results  - Monitor all insertion sites, i e  indwelling lines, tubes, and drains  - Monitor endotracheal if appropriate and nasal secretions for changes in amount and color  - [place] appropriate cooling/warming therapies per order  - Administer medications as ordered  - Instruct and encourage patient and family to use good hand hygiene technique  - Identify and instruct in appropriate isolation precautions for identified infection/condition  Outcome: Progressing     Problem: SAFETY ADULT  Goal: Patient will remain free of falls  Description: INTERVENTIONS:  - Educate patient/family on patient safety including physical limitations  - Instruct patient to call for assistance with activity   - Consult OT/PT to assist with strengthening/mobility   - Keep Call bell within reach  - Keep bed low and locked with side rails adjusted as appropriate  - Keep care items and personal belongings within reach  - Initiate and maintain comfort rounds  - Make Fall Risk Sign visible to staff  - Apply yellow socks and bracelet for high fall risk patients  - Consider moving patient to room near nurses station  Outcome: Progressing

## 2022-10-18 NOTE — ASSESSMENT & PLAN NOTE
Persistent hyperkalemia in high 5s despite hyperkalemia cocktail with albuterol, lasix/fluids, kayexalate   · Continue low potassium diet  · Kdur was discontinued   · Likely due in part to hyperglycemia   · Kayexalate again today   · Monitor BMP   · Nephrology eval

## 2022-10-18 NOTE — CONSULTS
Consultation - Nephrology   Qigladys Elodia Sr  78 y o  male MRN: [id number]  Unit/Bed#: [facility] 627-01 Encounter: [id number]    ASSESSMENT AND PLAN:  Patient is 32-year-old male with significant medical issues of uncontrolled diabetes, COPD, history of prostate cancer, status post radiation/seed treatment, CAD, status post PCI, diastolic CHF, was recently hospitalized with shortness of breath and now presents with shortness of breath again  We are consulted for MARANDA/CKD management  MARANDA POA on CKD stage III to stage IV, baseline creatinine 1 4 to 1 7 fluctuating going back to mid 2021  -since recent hospitalization creatinine seems to have plateaued 2 6 to 2 8 over last one week  -creatinine 2 7 today  -overall some progression of CKD suspected as well  -CKD suspect in the setting of hypertension, uncontrolled diabetes  -check bladder scan for PVR  -diuretics as below  -recent UA this month shows no hematuria or proteinuria   -avoid nephrotoxins or NSAIDs, BMP in a m   -continue to avoid ARB for now    Diastolic CHF  -recent echo in May 2022 shows EF 60%, dilated IVC  -currently not in any overt respiratory distress  Does have leg edema   -continue Lasix 40 mg daily  Status post IV Lasix yesterday   -remains on room air at the time of my encounter   -daily standing weight, accurate intake and output    Hyperkalemia  -suspect secondary to uncontrolled hyperglycemia, dietary component in the setting of advanced renal failure  Was also discharged on potassium supplement during discharge from last admission  Avoid K supplements for now    -serum potassium 6 7 now improving to 5 5  -good urine output with IV Lasix yesterday   -status post Kayexalate today  -BMP in a m   -strict low-potassium diet recommended  -needs better control of blood glucose    Anemia in CKD  -transfuse p r n  For hemoglobin less than seven, iron saturation 23%  COPD with recent exacerbation, pulmonary following, on tapering steroid  Currently remains on room air  Discussed above plan in detail with primary team    HISTORY OF PRESENT ILLNESS:  Requesting Physician: Mainor Lubin MD  Reason for Consult:  Sirisha Valdez  is a 78y o  year old male who was admitted to [facility] after presenting with worsening shortness of breath  A renal consultation is requested today for assistance in the management of MARANDA/CKD  Old medical records including prior lab values from [address] [place] [address] records were reviewed  Patient was recently hospitalized with shortness of breath and was discharge now presents again with shortness of breath  Currently remains on room air at the time of my encounter  he denies any nausea, vomiting  Since discharge from recent hospitalization, he admits to be eating tomatoes, potatoes, oranges etc  His breathing somewhat improved since admission  Does have stable leg edema  PAST MEDICAL HISTORY:  Past Medical History:   Diagnosis Date   • Abdominal pain    • MARANDA (acute kidney injury) ([facility] ) 6/19/2018   • Cardiac disease    • CHF (congestive heart failure) ([facility])    • COPD, severe (HCC)    • Coronary artery disease    • DDD (degenerative disc disease), lumbar 9/1/2020   • Diabetes mellitus (HCC)    • Dyspnea    • GERD (gastroesophageal reflux disease)    • Hyperlipidemia    • Hypertension    • MI (myocardial infarction) ([facility] )     with 3 stents   • Nodule of apex of right lung    • JACQUELINE (obstructive sleep apnea)    • Prostate cancer ([facility] )        PAST SURGICAL HISTORY:  Past Surgical History:   Procedure Laterality Date   • ABDOMINAL SURGERY      exploratory   • ANGIOPLASTY      3 stents   • APPENDECTOMY     • COLONOSCOPY  2015   • CT NEEDLE BIOPSY LUNG  11/3/2020   • ESOPHAGOGASTRODUODENOSCOPY N/A 10/2/2017    Procedure: ESOPHAGOGASTRODUODENOSCOPY (EGD); Surgeon: Grisel Mcdaniel MD;  Location: [facility];   Service: Gastroenterology   • IR THORACENTESIS  11/3/2020   • KNEE CARTILAGE SURGERY     • OTHER SURGICAL HISTORY      stent placement   • PROSTATE SURGERY     • SKIN GRAFT      Basal cell CA back       ALLERGIES:  Allergies   Allergen Reactions   • Crestor [Rosuvastatin] Other (See Comments)     Unknown     • Lisinopril GI Intolerance, Dizziness and Abdominal Pain   • Metformin GI Intolerance       SOCIAL HISTORY:  Social History     Substance and Sexual Activity   Alcohol Use Never     Social History     Substance and Sexual Activity   Drug Use No     Social History     Tobacco Use   Smoking Status Former Smoker   • Packs/day: 1 50   • Years: 50 00   • Pack years: 75 00   • Start date: 38   • Quit date: 2020   • Years since quittin 2   Smokeless Tobacco Never Used       FAMILY HISTORY:  Family History   Problem Relation Age of Onset   • Heart disease Father    • Other Father         Mesothelioma        MEDICATIONS:    Current Facility-Administered Medications:   •  acetaminophen (TYLENOL) tablet 650 mg, 650 mg, Oral, Q6H PRN, Bin Broussard MD  •  albuterol inhalation solution 2 5 mg, 2 5 mg, Nebulization, Q4H PRN, Evelina Gamble MD  •  aspirin (ECOTRIN LOW STRENGTH) EC tablet 81 mg, 81 mg, Oral, Daily, Bin Broussard MD, 81 mg at 10/18/22 0858  •  [START ON 10/19/2022] carvedilol (COREG) tablet 6 25 mg, 6 25 mg, Oral, BID With Meals, Maxim Toledo PA-C  •  cyclobenzaprine (FLEXERIL) tablet 10 mg, 10 mg, Oral, BID PRN, Bin Broussard MD, 10 mg at 10/17/22 0041  •  ferrous sulfate tablet 325 mg, 325 mg, Oral, Daily With Breakfast, Bin Broussard MD, 325 mg at 10/18/22 0858  •  fluticasone-vilanterol (BREO ELLIPTA) 100-25 mcg/inh inhaler 1 puff, 1 puff, Inhalation, Daily, Bin Broussard MD, 1 puff at 10/18/22 0902  •  furosemide (LASIX) tablet 40 mg, 40 mg, Oral, Daily, Bin Broussard MD, 40 mg at 10/18/22 0858  •  gabapentin (NEURONTIN) capsule 100 mg, 100 mg, Oral, TID, Bin Broussard MD, 100 mg at 10/18/22 0858  •  guaiFENesin (MUCINEX) 12 hr tablet 600 mg, 600 mg, Oral, BID, Ania Maldonado MD, 600 mg at 10/18/22 1930  •  hydrALAZINE (APRESOLINE) injection 5 mg, 5 mg, Intravenous, Q6H PRN, Daryl Zurita PA-C  •  insulin aspart protamine-insulin aspart (NovoLOG 70/30) 100 units/mL subcutaneous injection 30 Units, 30 Units, Subcutaneous, Before Dominique MD Fred, 30 Units at 10/17/22 1715  •  insulin aspart protamine-insulin aspart (NovoLOG 70/30) 100 units/mL subcutaneous injection 40 Units, 40 Units, Subcutaneous, Daily Before Lunch, Ania Maldonado MD, 40 Units at 10/18/22 1159  •  insulin aspart protamine-insulin aspart (NovoLOG 70/30) 100 units/mL subcutaneous injection 50 Units, 50 Units, Subcutaneous, Daily With Breakfast, Ania Maldonado MD, 50 Units at 10/18/22 0857  •  insulin lispro (HumaLOG) 100 units/mL subcutaneous injection 1-5 Units, 1-5 Units, Subcutaneous, HS, Sebas Perales MD, 2 Units at 10/17/22 2140  •  insulin lispro (HumaLOG) 100 units/mL subcutaneous injection 1-6 Units, 1-6 Units, Subcutaneous, TID AC, 6 Units at 10/18/22 1201 **AND** Fingerstick Glucose (POCT), , , TID AC, Ania Madlonado MD  •  levalbuterol (XOPENEX) inhalation solution 1 25 mg, 1 25 mg, Nebulization, TID, 1 25 mg at 10/18/22 0742 **AND** sodium chloride 0 9 % inhalation solution 3 mL, 3 mL, Nebulization, TID, Ania Maldonado MD, 3 mL at 10/18/22 0742  •  lidocaine (LIDODERM) 5 % patch 1 patch, 1 patch, Topical, Daily, Ania Maldonado MD, 1 patch at 10/18/22 0858  •  mupirocin (BACTROBAN) 2 % ointment, , Topical, BID, Lou Paiz MD, Given at 10/18/22 1202  •  nicotine (NICODERM CQ) 7 mg/24hr TD 24 hr patch 7 mg, 7 mg, Transdermal, Daily, Ania Maldonado MD, 7 mg at 10/18/22 0858  •  oxymetazoline (AFRIN) 0 05 % nasal spray 2 spray, 2 spray, Each Nare, Q12H [address], Marcia Workman PA-C, 2 spray at 10/18/22 1203  •  pantoprazole (PROTONIX) EC tablet 40 mg, 40 mg, Oral, Early Morning, Sebas gaston, MD, 40 mg at 10/18/22 8914  •  pravastatin (PRAVACHOL) tablet 80 mg, 80 mg, Oral, Daily With Rajinder Perez MD, 80 mg at 10/17/22 1705  •  predniSONE tablet 40 mg, 40 mg, Oral, Daily, Margarita Huang MD, 40 mg at 10/18/22 5408  •  sodium chloride (OCEAN) 0 65 % nasal spray 1 spray, 1 spray, Each Nare, 4x Daily, Juan Fragoso MD, 1 spray at 10/18/22 1202  •  tamsulosin (FLOMAX) capsule 0 4 mg, 0 4 mg, Oral, Daily With Dinner, Ailin Christy MD, 0 4 mg at 10/17/22 1705  •  umeclidinium bromide (INCRUSE ELLIPTA) 62 5 mcg/inh inhaler AEPB 1 puff, 1 puff, Inhalation, Daily, Ailin Christy MD, 1 puff at 10/18/22 0902    REVIEW OF SYSTEMS:  Complete 10 point review of systems were obtained and discussed in length with the patient  Complete review of systems were negative / unremarkable except mentioned in the HPI section       PHYSICAL EXAM:  Current Weight: Weight - Scale: 97 5 kg (214 lb 15 2 oz)  First Weight: Weight - Scale: 96 6 kg (213 lb)  Vitals:    10/18/22 1109   BP: 135/51   Pulse: 70   Resp: 16   Temp: 98 2 °F (36 8 °C)   SpO2: 94%       Intake/Output Summary (Last 24 hours) at 10/18/2022 1318  Last data filed at 10/18/2022 1211  Gross per 24 hour   Intake 250 ml   Output 3725 ml   Net -3475 ml     Wt Readings from Last 3 Encounters:   10/17/22 97 5 kg (214 lb 15 2 oz)   10/15/22 98 7 kg (217 lb 11 1 oz)   09/17/22 101 kg (222 lb)     Temp Readings from Last 3 Encounters:   10/18/22 98 2 °F (36 8 °C)   10/15/22 97 5 °F (36 4 °C)   09/17/22 97 6 °F (36 4 °C) (Oral)     BP Readings from Last 3 Encounters:   10/18/22 135/51   10/15/22 94/66   09/17/22 110/54     Pulse Readings from Last 3 Encounters:   10/18/22 70   10/15/22 66   09/17/22 82        Physical Examination:  General:  Sitting in bed, no acute distress  Eyes:  Mild conjunctival pallor present  ENT:  External examination of ears and nose unremarkable  Neck:  No obvious lymphadenopathy appreciated  Respiratory: Bilateral air entry present  CVS:  S1, S2 present  GI:  Soft, nondistended  CNS:  Active alert oriented x3  Extremities:  1+ edema in both legs  Psych:  Conscious, coherent, oriented  Skin:  No new rash in legs    Invasive Devices:      Lab Results:   Results from last 7 days   Lab Units 10/18/22  0847 10/18/22  0157 10/18/22  0148 10/17/22  2031 10/17/22  1504 10/17/22  1003 10/16/22  2032 10/15/22  0611 10/14/22  0637 10/13/22  0646   WBC Thousand/uL  --  8 81  --   --   --  12 10* 14 13* 6 23  --   --    HEMOGLOBIN g/dL  --  7 9*  --   --   --  9 9* 10 2* 10 3*  --   --    HEMATOCRIT %  --  24 9*  --   --   --  32 8* 32 3* 32 3*  --   --    PLATELETS Thousands/uL  --  117*  --   --   --  171 183 122*  --   --    POTASSIUM mmol/L 5 5*  --  5 7* 6 7* 6 4* 5 7* 5 9* 4 8   < > 4 5   CHLORIDE mmol/L 102  --  103 103 104 105 104 108   < > 107   CO2 mmol/L 25  --  29 27 25 25 28 25   < > 25   BUN mg/dL 86*  --  90* 90* 82* 78* 90* 72*   < > 74*   CREATININE mg/dL 2 78*  --  2 68* 2 78* 2 82* 2 61* 2 93* 2 66*   < > 2 81*   CALCIUM mg/dL 8 5  --  8 4 8 3 8 6 8 5 8 2* 8 2*   < > 8 3   MAGNESIUM mg/dL  --   --   --   --   --  2 6  --   --   --   --    PHOSPHORUS mg/dL  --   --   --   --   --  5 2*  --   --   --  4 9*    < > = values in this interval not displayed  Other Studies:   XR chest 1 view portable   Final Result by Sarahy Barker MD (10/17 0845)      Stable appearance of volume loss and chronic opacities in the right                  Workstation performed: [id number]         Chest x-ray images personally reviewed which shows chronic opacities in the right lung    Portions of the record may have been created with voice recognition software  Occasional wrong word or "sound a like" substitutions may have occurred due to the inherent limitations of voice recognition software  Read the chart carefully and recognize, using context, where substitutions have occurred

## 2022-10-18 NOTE — DISCHARGE SUMMARY
[address] [facility]  Discharge- Brian Hollow Sr  1945, 78 y o  male MRN: [id number]  Unit/Bed#: CW2 218-01 Encounter: [id number]  Primary Care Provider: Maximiliano Hamm MD   Date and time admitted to hospital: 10/10/2022  9:01 AM    A-fib [facility])  Assessment & Plan  · Start Eliquis on discharge price check 10 dollars per month    Stage 3b chronic kidney disease (CKD) ([facility] )  Assessment & Plan  · Will hold Arb on discharge  · Continue Lasix 40 mg daily  · Repeat BMP after discharge    Acute on chronic respiratory failure with hypoxia and hypercapnia (HCC)  Assessment & Plan  · Wears 2 L PRN at baseline  · Currently on baseline oxygen requirements    CAD (coronary artery disease)  Assessment & Plan  · Continue aspirin statin beta-blocker    Essential hypertension  Assessment & Plan  · We will continue to hold Arb on discharge  · Continue home regiment otherwise    Chronic diastolic heart failure (HCC)  Assessment & Plan  Wt Readings from Last 3 Encounters:   10/17/22 97 5 kg (214 lb 15 2 oz)   10/15/22 98 7 kg (217 lb 11 1 oz)   09/17/22 101 kg (222 lb)   · Continue beta-blocker  · Discharge on Lasix 40 mg daily  · I&Os, daily weights, low-sodium diet- per RN patient is not following a fluid restriction   · Monitor volume status    Type 2 diabetes mellitus with hyperglycemia, with long-term current use of insulin [facility])  Assessment & Plan  Lab Results   Component Value Date    HGBA1C 9 7 (H) 10/10/2022       Recent Labs     10/17/22  0034 10/17/22  0750 10/17/22  1142 10/17/22  1636   POCGLU 270* 179* 457* 264*     Hx of poor control  · Will discharge on Endocrine recommendations for insulin    * SOB (shortness of breath)  Assessment & Plan  · Discharge with prednisone course        Medical Problems             Resolved Problems  Date Reviewed: 10/17/2022   None               Discharging Physician / Practitioner: Johny Dominguez  PCP: Maximiliano Hamm MD  Admission Date:   Admission Orders (From admission, onward)     Ordered        10/10/22 1047  [address]  [address]                      Discharge Date: 10/15/22  Consultations During Hospital Stay:  · Nephrology  · Endocrinology    Procedures Performed:   · none    Significant Findings / Test Results:   XR chest 1 view portable   Final Result by Jakob Simpson MD (10/10 3479)      No acute cardiopulmonary disease  Redemonstration of scar in the right upper lung, right base rounded atelectasis, and small loculated right effusion  Workstation performed: [id number]         ·     Incidental Findings:   · As under ioamging     Test Results Pending at Discharge (will require follow up):   · none     Outpatient Tests Requested:  · bmp    Complications:  none    Reason for Admission:  Shortness of breath    Hospital Course:   Tutu Calix  is a 78 y o  male patient who originally presented to the hospital on 10/10/2022 due to shortness of breath  His symptoms improved with diuretics  Nephrology was consulted for acute kidney injury his Arb was held and he was continued on 40 mg of Lasix on discharge  Will have repeat BMP as well  Endocrinology was consulted for insulin treatment optimization  He was recommended to continue on Humalog 50 units before breakfast, 40 units at lunch and 30 units before dinner  Please see above list of diagnoses and related plan for additional information  Condition at Discharge: stable    Discharge Day Visit / Exam:   Subjective:  Patient denies any acute complaints  Vitals: Blood Pressure: 94/66 (10/15/22 1600)  Pulse: 66 (10/15/22 0805)  Temperature: 97 5 °F (36 4 °C) (10/15/22 0805)  Temp Source: Oral (10/14/22 1531)  Respirations: 20 (10/15/22 0805)  Weight - Scale: 98 7 kg (217 lb 11 1 oz) (10/15/22 0600)  SpO2: 93 % (10/15/22 0805)  Exam:   Physical Exam  Vitals and nursing note reviewed  Constitutional:       General: He is not in acute distress       Appearance: He is well-developed  He is not ill-appearing, toxic-appearing or diaphoretic  HENT:      Head: Normocephalic and atraumatic  Eyes:      General: No scleral icterus  Conjunctiva/sclera: Conjunctivae normal    Cardiovascular:      Rate and Rhythm: Normal rate  Rhythm irregular  Heart sounds: No murmur heard  No friction rub  No gallop  Pulmonary:      Effort: Pulmonary effort is normal  No respiratory distress  Breath sounds: Normal breath sounds  No stridor  No wheezing or rhonchi  Abdominal:      General: There is no distension  Palpations: Abdomen is soft  There is no mass  Tenderness: There is no abdominal tenderness  There is no guarding or rebound  Hernia: No hernia is present  Musculoskeletal:      Cervical back: Neck supple  Right lower leg: Edema present  Left lower leg: Edema present  Skin:     General: Skin is warm and dry  Neurological:      Mental Status: He is alert and oriented to person, place, and time  Discussion with Family: Patient declined call to   Discharge instructions/Information to patient and family:   See after visit summary for information provided to patient and family  Provisions for Follow-Up Care:  See after visit summary for information related to follow-up care and any pertinent home health orders  Disposition:   Home with [facility] Services (Reminder: Complete face to face encounter)    Planned Readmission: no     Discharge Statement:  I spent 35 minutes discharging the patient  This time was spent on the day of discharge  I had direct contact with the patient on the day of discharge  Greater than 50% of the total time was spent examining patient, answering all patient questions, arranging and discussing plan of care with patient as well as directly providing post-discharge instructions  Additional time then spent on discharge activities      Discharge Medications:  See after visit summary for reconciled discharge medications provided to patient and/or family        **Please Note: This note may have been constructed using a voice recognition system**

## 2022-10-18 NOTE — ASSESSMENT & PLAN NOTE
Wt Readings from Last 3 Encounters:   10/17/22 97 5 kg (214 lb 15 2 oz)   10/15/22 98 7 kg (217 lb 11 1 oz)   09/17/22 101 kg (222 lb)   · Continue beta-blocker  · Discharge on Lasix 40 mg daily  · I&Os, daily weights, low-sodium diet- per RN patient is not following a fluid restriction   · Monitor volume status

## 2022-10-18 NOTE — ASSESSMENT & PLAN NOTE
Lab Results   Component Value Date    HGBA1C 9 7 (H) 10/10/2022       Recent Labs     10/17/22  1636 10/17/22  2054 10/17/22  2317 10/18/22  0807   POCGLU 264* 237* 183* 227*       Blood Sugar Average: Last 72 hrs:  (P) 259 [id number]   · Patient reporting labile blood sugars since discharge, as high as in the 300s and as low as 20  · Recently discharged on new dosing regimen of Humalog 75/25 - 50 units, 40 units, 30 units with breakfast lunch and dinner respectively  · Appreciate endocrinology consult and recommendations  · Carb controlled diet   · Hypoglycemia protocol

## 2022-10-18 NOTE — CONSULTS
Consultation - General Cardiology Team B  Erlin Jorge  78 y o  male MRN: [id number]  Unit/Bed#: [facility] 476-08 Encounter: [id number]    Inpatient consult to Cardiology  Consult performed by: Sylvia De Santiago  Consult ordered by: Tino Resendiz PA-C      PCP: Tracy Shah MD   Outpatient Cardiologist: No regular follow-up, saw Dr Ny Carroll in 9/2020    History of Present Illness   Physician Requesting Consult: Hernan Larson MD  Reason for Consult / Principal Problem: Chest pain, chronic diastolic heart failure    HPI: Erlin Jorge  is a 78y o  year old male who presented to the ED 10/16 with shortness of breath and chest pain at rest  Episode brought on by pain in back  This follows recent admission 10/10-10/15 for COPD exacerbation, multiple admissions this year  He reports chest pain accompanied by shortness of breath that occurs both at rest and with exertion  Pain resolves without intervention within thirty minutes to few hours  Has not taking nitroglycerin for this pain  On 2L nc at home  Reports baseline level of bilateral lower extremity edema  Has history of CAD and reports having three stents placed in the past, year unknown  Nuclear study in May 2022 negative for ischemia, EKG 10/17 showed no acute ischemia  Troponins normal on admission  Additional history of tobacco use, COPD, chronic diastolic HF EF 77%, A fib on eliquis (currently held), CKD IIIB, DM on insulin, HLD, HTN, lung cancer, prostate cancer  Currently denies chest pain, shortness of breath, palpitations, increase in lower extremity swelling  Acute anemia present with Hb 7 9 from baseline of 12  Workup pending per primary  Denies blood in stool  Review of Systems  Review of system was conducted and was negative except for as stated in the HPI        Historical Information   Past Medical History:   Diagnosis Date   • Abdominal pain    • MARANDA (acute kidney injury) ([facility] [address] ) 6/19/2018   • Cardiac disease    • CHF (congestive heart failure) (HCC)    • COPD, severe (HCC)    • Coronary artery disease    • DDD (degenerative disc disease), lumbar 2020   • Diabetes mellitus (HCC)    • Dyspnea    • GERD (gastroesophageal reflux disease)    • Hyperlipidemia    • Hypertension    • MI (myocardial infarction) ([address] )     with 3 stents   • Nodule of apex of right lung    • JACQUELINE (obstructive sleep apnea)    • Prostate cancer [facility])      Past Surgical History:   Procedure Laterality Date   • ABDOMINAL SURGERY      exploratory   • ANGIOPLASTY      3 stents   • APPENDECTOMY     • COLONOSCOPY     • CT NEEDLE BIOPSY LUNG  11/3/2020   • ESOPHAGOGASTRODUODENOSCOPY N/A 10/2/2017    Procedure: ESOPHAGOGASTRODUODENOSCOPY (EGD); Surgeon: Merribeth Nissen, MD;  Location: [facility];   Service: Gastroenterology   • IR THORACENTESIS  11/3/2020   • KNEE CARTILAGE SURGERY     • OTHER SURGICAL HISTORY      stent placement   • PROSTATE SURGERY     • SKIN GRAFT      Basal cell CA back     Social History     Substance and Sexual Activity   Alcohol Use Never     Social History     Substance and Sexual Activity   Drug Use No     Social History     Tobacco Use   Smoking Status Former Smoker   • Packs/day: 1 50   • Years: 50 00   • Pack years: 75 00   • Start date: 38   • Quit date: 2020   • Years since quittin 2   Smokeless Tobacco Never Used     Meds/Allergies   Hospital Medications:   Current Facility-Administered Medications   Medication Dose Route Frequency   • acetaminophen (TYLENOL) tablet 650 mg  650 mg Oral Q6H PRN   • albuterol inhalation solution 2 5 mg  2 5 mg Nebulization Q4H PRN   • aspirin (ECOTRIN LOW STRENGTH) EC tablet 81 mg  81 mg Oral Daily   • [START ON 10/19/2022] carvedilol (COREG) tablet 6 25 mg  6 25 mg Oral BID With Meals   • cyclobenzaprine (FLEXERIL) tablet 10 mg  10 mg Oral BID PRN   • ferrous sulfate tablet 325 mg  325 mg Oral Daily With Breakfast   • fluticasone-vilanterol (BREO ELLIPTA) 100-25 mcg/inh inhaler 1 puff  1 puff Inhalation Daily   • furosemide (LASIX) tablet 40 mg  40 mg Oral Daily   • gabapentin (NEURONTIN) capsule 100 mg  100 mg Oral TID   • guaiFENesin (MUCINEX) 12 hr tablet 600 mg  600 mg Oral BID   • hydrALAZINE (APRESOLINE) injection 5 mg  5 mg Intravenous Q6H PRN   • insulin aspart protamine-insulin aspart (NovoLOG 70/30) 100 units/mL subcutaneous injection 30 Units  30 Units Subcutaneous Before Dinner   • insulin aspart protamine-insulin aspart (NovoLOG 70/30) 100 units/mL subcutaneous injection 40 Units  40 Units Subcutaneous Daily Before Lunch   • insulin aspart protamine-insulin aspart (NovoLOG 70/30) 100 units/mL subcutaneous injection 50 Units  50 Units Subcutaneous Daily With Breakfast   • insulin lispro (HumaLOG) 100 units/mL subcutaneous injection 1-5 Units  1-5 Units Subcutaneous HS   • insulin lispro (HumaLOG) 100 units/mL subcutaneous injection 1-6 Units  1-6 Units Subcutaneous TID AC   • levalbuterol (XOPENEX) inhalation solution 1 25 mg  1 25 mg Nebulization TID    And   • sodium chloride 0 9 % inhalation solution 3 mL  3 mL Nebulization TID   • lidocaine (LIDODERM) 5 % patch 1 patch  1 patch Topical Daily   • mupirocin (BACTROBAN) 2 % ointment   Topical BID   • nicotine (NICODERM CQ) 7 mg/24hr TD 24 hr patch 7 mg  7 mg Transdermal Daily   • oxymetazoline (AFRIN) 0 05 % nasal spray 2 spray  2 spray Each Nare Q12H [address]   • pantoprazole (PROTONIX) EC tablet 40 mg  40 mg Oral Early Morning   • pravastatin (PRAVACHOL) tablet 80 mg  80 mg Oral Daily With Dinner   • predniSONE tablet 40 mg  40 mg Oral Daily   • sodium chloride (OCEAN) 0 65 % nasal spray 1 spray  1 spray Each Nare 4x Daily   • tamsulosin (FLOMAX) capsule 0 4 mg  0 4 mg Oral Daily With Dinner   • umeclidinium bromide (INCRUSE ELLIPTA) 62 5 mcg/inh inhaler AEPB 1 puff  1 puff Inhalation Daily     Home Medications:   Medications Prior to Admission   Medication   • apixaban (ELIQUIS) 5 mg   • aspirin (ECOTRIN LOW STRENGTH) 81 mg EC tablet   • carvedilol (COREG) 6 25 mg tablet   • cyclobenzaprine (FLEXERIL) 10 mg tablet   • ferrous sulfate 325 (65 Fe) mg tablet   • fluticasone-umeclidinium-vilanterol (Trelegy Ellipta) 100-62 5-25 MCG/INH inhaler   • furosemide (LASIX) 40 mg tablet   • gabapentin (NEURONTIN) 100 mg capsule   • glucose blood test strip   • insulin lispro protamine-insulin lispro (HumaLOG Mix 75/25) 100 units/mL   • lidocaine (Lidoderm) 5 %   • pantoprazole (PROTONIX) 40 mg tablet   • potassium chloride (K-DUR,KLOR-CON) 20 mEq tablet   • simvastatin (ZOCOR) 40 mg tablet   • tamsulosin (FLOMAX) 0 4 mg   • umeclidinium bromide (INCRUSE ELLIPTA) 62 5 mcg/inh AEPB inhaler       Allergies   Allergen Reactions   • Crestor [Rosuvastatin] Other (See Comments)     Unknown     • Lisinopril GI Intolerance, Dizziness and Abdominal Pain   • Metformin GI Intolerance       Objective   Vitals: Blood pressure 135/51, pulse 70, temperature 98 2 °F (36 8 °C), resp  rate 16, height 6' (1 829 m), weight 97 5 kg (214 lb 15 2 oz), SpO2 94 %  Orthostatic Blood Pressures    Flowsheet Row Most Recent Value   Blood Pressure 135/51 filed at 10/18/2022 1109   Patient Position - Orthostatic VS Lying filed at 10/16/2022 2023            Invasive Devices  Report    Peripheral Intravenous Line  Duration           Peripheral IV 10/16/22 Left Forearm 1 day                Physical Exam    GEN: Lizzy De Guzman Sr  appears well, alert and oriented x 3  NECK: No JVD  HEART: Normal rate, normal S1 and S2, no murmurs, clicks, gallops or rubs   LUNGS: Decreased breath sounds  No wheezes, rales, or rhonchi; respiration nonlabored on 2 L nc   EXTREMITIES: Bilateral lower extremity edema to level of knees  NEURO: Grossly intact  SKIN: Discoloration/rash of both shins, chronic per patient         Lab Results:   CBC with diff:   Results from last 7 days   Lab Units 10/18/22  0157   WBC Thousand/uL 8 81   RBC Million/uL 2 87*   HEMOGLOBIN g/dL 7 9*   HEMATOCRIT % 24 9*   MCV fL 87 MCH pg 27 5   MCHC g/dL 31 7   RDW % 15 2*   MPV fL 10 7   PLATELETS Thousands/uL 117*     CMP:   Results from last 7 days   Lab Units 10/18/22  0847 10/17/22  1504 10/17/22  1003   SODIUM mmol/L 136   < > 137   CHLORIDE mmol/L 102   < > 105   CO2 mmol/L 25   < > 25   BUN mg/dL 86*   < > 78*   CREATININE mg/dL 2 78*   < > 2 61*   CALCIUM mg/dL 8 5   < > 8 5   AST U/L  --   --  10   ALT U/L  --   --  30   ALK PHOS U/L  --   --  62   EGFR ml/min/1 73sq m 21   < > 22    < > = values in this interval not displayed       HS Troponin:   0   Lab Value Date/Time    HSTNI0 40 10/16/2022 2032    HSTNI2 38 10/16/2022 2211    HSTNI4 40 10/17/2022 0109    HSTNI4 16 10/10/2022 1318    HSTNI4 9 06/10/2022 1036    HSTNI4 14 05/22/2022 0214    HSTNI4 20 05/02/2022 0309    HSTNI4 15 02/21/2022 0745    HSTNI4 61 01/21/2022 0444    HSTNI4 15 12/11/2021 1735     BNP:   Results from last 7 days   Lab Units 10/18/22  0847   POTASSIUM mmol/L 5 5*   CHLORIDE mmol/L 102   CO2 mmol/L 25   BUN mg/dL 86*   CREATININE mg/dL 2 78*   CALCIUM mg/dL 8 5   EGFR ml/min/1 73sq m 21     Magnesium:   Results from last 7 days   Lab Units 10/17/22  1003   MAGNESIUM mg/dL 2 6     Lipid Profile:     Results from last 7 days   Lab Units 10/17/22  0109 10/16/22  2211 10/16/22  2032   HS TNI 0HR ng/L  --   --  40   HS TNI 2HR ng/L  --  38  --    HS TNI 4HR ng/L 40  --   --          Results from last 7 days   Lab Units 10/18/22  0847 10/18/22  0148 10/17/22  2031   POTASSIUM mmol/L 5 5* 5 7* 6 7*   CO2 mmol/L 25 29 27   CHLORIDE mmol/L 102 103 103   BUN mg/dL 86* 90* 90*   CREATININE mg/dL 2 78* 2 68* 2 78*     Results from last 7 days   Lab Units 10/18/22  0157 10/17/22  1003 10/16/22  2032   HEMOGLOBIN g/dL 7 9* 9 9* 10 2*   HEMATOCRIT % 24 9* 32 8* 32 3*   PLATELETS Thousands/uL 117* 171 183           EKG:   Date: 10/17/22  Interpretation:   Atrial fibrillation  Right bundle branch block  Left posterior fascicular block  Bifascicular block  Possible Inferior infarct (cited on or before 17-OCT-2022)  Abnormal ECG  When compared with ECG of 17-OCT-2022 22:07, (unconfirmed)  No significant change was found  Confirmed by Juanpablo Gracia ([id number]) on 10/18/2022 7:39:13 AM    Previous STRESS TEST:  No results found for this or any previous visit  No results found for this or any previous visit  Results for orders placed during the hospital encounter of 22    NM myocardial perfusion spect (rx stress and/or rest)    Interpretation Summary  •  Stress ECG: The ECG was not diagnostic due to pharmacological (vasodilator) stress  •  Perfusion: There is a left ventricular perfusion defect that is medium in size present in the basal to mid inferior location(s) that is paradoxical   •  Stress Function: Left ventricular function post-stress is normal  Post-stress ejection fraction is 61 %  No ischemia or scar  [id number]%      Previous Cath/PCI:  No results found for this or any previous visit  No results found for this or any previous visit  No results found for this or any previous visit  ECHO:  Results for orders placed during the hospital encounter of 20    Echo complete with contrast if indicated    Narrative  [facility] [address]  [facility] [facility]  [phone]    Transthoracic Echocardiogram  2D, Doppler, and Color Doppler    Study date:  2020    Patient: Teresa Ellsworth  MR number: [id number]  Account number: [de-identified]  : 1945  Age: 69 years  Gender: Male  Status: Inpatient  Location: Bedside  Height: 72 in  Weight: 265 lb  BP: 158/ 83 mmHg    Indications: Heart Failure    Diagnoses: I50 9 - Heart failure, unspecified    Sonographer:  JOLIE Guadalupe  Primary Physician:  Karen Cole MD  Referring Physician:   YOLANDE More  Group:  [facility]  Cardiology Fellow:  Anirudh Avitia DO  Interpreting Physician:  Germain Beltre MD    SUMMARY    PROCEDURE INFORMATION:  This was a technically difficult study  Cardiac structures were not all visualized  Intravenous contrast (  4ml Definity in NSS) was administered to opacify the left ventricle  LEFT VENTRICLE:  Systolic function was normal  Ejection fraction was estimated to be 68 %  There were no regional wall motion abnormalities  Wall thickness was increased  Concentric hypertrophy was present  Doppler parameters were consistent with abnormal left ventricular relaxation (grade 1 diastolic dysfunction)  RIGHT VENTRICLE:  The ventricle was dilated  Systolic function was reduced  LEFT ATRIUM:  The atrium was dilated  HISTORY: PRIOR HISTORY: GERD,Pleural effusion,Obesity,DM2,CKD3,MI,COPD,SOB,CHF,CAD,CP,RBBB,JACQUELINE,HTN,HLD    PROCEDURE: The procedure was performed at the bedside  This was a routine study  The transthoracic approach was used  The study included complete 2D imaging, complete spectral Doppler, and color Doppler  The heart rate was 92 bpm, at the  start of the study  Intravenous contrast (  4ml Definity in NSS) was administered to opacify the left ventricle  Echocardiographic views were limited due to poor acoustic window availability, decreased penetration, and lung interference  No M-mode measurements were possible  This was a technically difficult study  Cardiac structures were not all visualized  LEFT VENTRICLE: Size was normal  Systolic function was normal  Ejection fraction was estimated to be 68 %  There were no regional wall motion abnormalities  Wall thickness was increased  Concentric hypertrophy was present  DOPPLER: Doppler  parameters were consistent with abnormal left ventricular relaxation (grade 1 diastolic dysfunction)  RIGHT VENTRICLE: The ventricle was dilated  Systolic function was reduced  LEFT ATRIUM: The atrium was dilated  MITRAL VALVE: Valve structure was normal  There was normal leaflet separation   DOPPLER: The transmitral velocity was within the normal range  There was no evidence for stenosis  There was no regurgitation  AORTIC VALVE: The valve was trileaflet  Leaflets exhibited normal cuspal separation and sclerosis  DOPPLER: Transaortic velocity was within the normal range  There was no evidence for stenosis  There was no regurgitation  PERICARDIUM: There was no pericardial effusion  The pericardium was normal in appearance  AORTA: The root exhibited normal size  SYSTEM MEASUREMENT TABLES    PW  E': 0 08 m/s  E/E': 9 45  MV A Jose: 1 04 m/s  MV Dec Bradley: 4 07 m/s2  MV DecT: 195 86 ms  MV E Jose: 0 8 m/s  MV E/A Ratio: 0 76  MV PHT: 56 8 ms  MVA By PHT: 3 87 cm2    [employer] Accredited Echocardiography Laboratory    Prepared and electronically signed by    Yogi Bueno MD  Signed 30-Jul-2020 13:42:14    Results for orders placed during the hospital encounter of 05/05/22    Echo complete w/ contrast if indicated    Interpretation Summary  •  Left Ventricle: Left ventricular cavity size is normal  Wall thickness is increased  The left ventricular ejection fraction is 60%  Systolic function is normal  Wall motion is normal  Diastolic function is normal   •  Right Ventricle: Right ventricular cavity size is mildly dilated  Systolic function is normal   •  Right Atrium: The atrium is mildly dilated  •  Aortic Valve: There is mild regurgitation  •  Tricuspid Valve: There is mild regurgitation  VIV:  No results found for this or any previous visit  No results found for this or any previous visit  CMR:  No results found for this or any previous visit  No results found for this or any previous visit  No results found for this or any previous visit  HOLTER  No results found for this or any previous visit  No results found for this or any previous visit  VTE Prophylaxis: Sequential compression device (Venodyne)       Assessment/Plan     Assessment:  78 yom who presented to ED due to SOB, chest pain   Does not appear to be in acute heart failure, no acute ischemic event recorded on EKG  Chest pain/tightness possibly attributed to COPD exacerbations, cannot rule out ischemic component with history of CAD  Plan:  - Anemia workup per primary  - Begin Imdur 30 mg daily for potentially anginal chest pain   - Eliquis for A fib held in setting of acute anemia   - Continue Lasix 40 mg daily  - Coreg 6 25 mg BID  - Monitor I/O, weights  - Follow up outpatient cardiology     Case discussed and reviewed with Dr Ladi Rasheed who agrees with my assessment and plan  Thank you for involving us in the care of your patient  Eveline Avitia, M4  Cardiology     ==========================================================================================    Counseling / Coordination of Care  Total floor / unit time spent today 30 minutes  Greater than 50% of total time was spent with the patient and/or coordination of care

## 2022-10-18 NOTE — PLAN OF CARE
Problem: Potential for Falls  Goal: Patient will remain free of falls  Description: INTERVENTIONS:  - Educate patient/family on patient safety including physical limitations  - Instruct patient to call for assistance with activity   - Consult OT/PT to assist with strengthening/mobility   - Keep Call bell within reach  - Keep bed low and locked with side rails adjusted as appropriate  - Keep care items and personal belongings within reach  - Initiate and maintain comfort rounds  - Make Fall Risk Sign visible to staff  - Offer Toileting every Hours, in advance of need  - Initiate/Maintain alarm  - Obtain necessary fall risk management equipment:   - Apply yellow socks and bracelet for high fall risk patients  - Consider moving patient to room near nurses station  Outcome: Progressing     Problem: PAIN - ADULT  Goal: Verbalizes/displays adequate comfort level or baseline comfort level  Description: Interventions:  - Encourage patient to monitor pain and request assistance  - Assess pain using appropriate pain scale  - Administer analgesics based on type and severity of pain and evaluate response  - Implement non-pharmacological measures as appropriate and evaluate response  - Consider cultural and social influences on pain and pain management  - Notify physician/advanced practitioner if interventions unsuccessful or patient reports new pain  Outcome: Progressing     Problem: INFECTION - ADULT  Goal: Absence or prevention of progression during hospitalization  Description: INTERVENTIONS:  - Assess and monitor for signs and symptoms of infection  - Monitor lab/diagnostic results  - Monitor all insertion sites, i e  indwelling lines, tubes, and drains  - Monitor endotracheal if appropriate and nasal secretions for changes in amount and color  - Kirby appropriate cooling/warming therapies per order  - Administer medications as ordered  - Instruct and encourage patient and family to use good hand hygiene technique  - Identify and instruct in appropriate isolation precautions for identified infection/condition  Outcome: Progressing  Goal: Absence of fever/infection during neutropenic period  Description: INTERVENTIONS:  - Monitor WBC    Outcome: Progressing     Problem: SAFETY ADULT  Goal: Patient will remain free of falls  Description: INTERVENTIONS:  - Educate patient/family on patient safety including physical limitations  - Instruct patient to call for assistance with activity   - Consult OT/PT to assist with strengthening/mobility   - Keep Call bell within reach  - Keep bed low and locked with side rails adjusted as appropriate  - Keep care items and personal belongings within reach  - Initiate and maintain comfort rounds  - Make Fall Risk Sign visible to staff  - Offer Toileting every Hours, in advance of need  - Initiate/Maintain alarm  - Obtain necessary fall risk management equipment:   - Apply yellow socks and bracelet for high fall risk patients  - Consider moving patient to room near nurses station  Outcome: Progressing  Goal: Maintain or return to baseline ADL function  Description: INTERVENTIONS:  -  Assess patient's ability to carry out ADLs; assess patient's baseline for ADL function and identify physical deficits which impact ability to perform ADLs (bathing, care of mouth/teeth, toileting, grooming, dressing, etc )  - Assess/evaluate cause of self-care deficits   - Assess range of motion  - Assess patient's mobility; develop plan if impaired  - Assess patient's need for assistive devices and provide as appropriate  - Encourage maximum independence but intervene and supervise when necessary  - Involve family in performance of ADLs  - Assess for home care needs following discharge   - Consider OT consult to assist with ADL evaluation and planning for discharge  - Provide patient education as appropriate  Outcome: Progressing  Goal: Maintains/Returns to pre admission functional level  Description: INTERVENTIONS:  - Perform BMAT or MOVE assessment daily    - Set and communicate daily mobility goal to care team and patient/family/caregiver  - Collaborate with rehabilitation services on mobility goals if consulted  - Perform Range of Motion  times a day  - Reposition patient every  hours  - Dangle patient times a day  - Stand patient  times a day  - Ambulate patient  times a day  - Out of bed to chair  times a day   - Out of bed for meals  times a day  - Out of bed for toileting  - Record patient progress and toleration of activity level   Outcome: Progressing     Problem: DISCHARGE PLANNING  Goal: Discharge to home or other facility with appropriate resources  Description: INTERVENTIONS:  - Identify barriers to discharge w/patient and caregiver  - Arrange for needed discharge resources and transportation as appropriate  - Identify discharge learning needs (meds, wound care, etc )  - Arrange for interpretive services to assist at discharge as needed  - Refer to Case Management Department for coordinating discharge planning if the patient needs post-hospital services based on physician/advanced practitioner order or complex needs related to functional status, cognitive ability, or social support system  Outcome: Progressing     Problem: Knowledge Deficit  Goal: Patient/family/caregiver demonstrates understanding of disease process, treatment plan, medications, and discharge instructions  Description: Complete learning assessment and assess knowledge base    Interventions:  - Provide teaching at level of understanding  - Provide teaching via preferred learning methods  Outcome: Progressing     Problem: RESPIRATORY - ADULT  Goal: Achieves optimal ventilation and oxygenation  Description: INTERVENTIONS:  - Assess for changes in respiratory status  - Assess for changes in mentation and behavior  - Position to facilitate oxygenation and minimize respiratory effort  - Oxygen administered by appropriate delivery if ordered  - Initiate smoking cessation education as indicated  - Encourage broncho-pulmonary hygiene including cough, deep breathe, Incentive Spirometry  - Assess the need for suctioning and aspirate as needed  - Assess and instruct to report SOB or any respiratory difficulty  - Respiratory Therapy support as indicated  Outcome: Progressing     Problem: Prexisting or High Potential for Compromised Skin Integrity  Goal: Skin integrity is maintained or improved  Description: INTERVENTIONS:  - Identify patients at risk for skin breakdown  - Assess and monitor skin integrity  - Assess and monitor nutrition and hydration status  - Monitor labs   - Assess for incontinence   - Turn and reposition patient  - Assist with mobility/ambulation  - Relieve pressure over bony prominences  - Avoid friction and shearing  - Provide appropriate hygiene as needed including keeping skin clean and dry  - Evaluate need for skin moisturizer/barrier cream  - Collaborate with interdisciplinary team   - Patient/family teaching  - Consider wound care consult   Outcome: Progressing

## 2022-10-18 NOTE — ASSESSMENT & PLAN NOTE
· Noted, appears baseline Hgb has been around 12    Hgb has been slowly trending down and today 7 9   · Recently started on Eliquis this month with subsequent drop in Hgb  · Check iron panel, B12, folate, FOBT   · Pt reports intermittent episodes of epistaxis -- consult ENT for evaluation  · Holding Eliquis for now   · If ENT eval is unremarkable likely will need GI eval

## 2022-10-19 LAB
ANION GAP SERPL CALCULATED.3IONS-SCNC: 8 MMOL/L (ref 4–13)
BUN SERPL-MCNC: 100 MG/DL (ref 5–25)
CALCIUM SERPL-MCNC: 8 MG/DL (ref 8.3–10.1)
CHLORIDE SERPL-SCNC: 105 MMOL/L (ref 96–108)
CO2 SERPL-SCNC: 26 MMOL/L (ref 21–32)
CREAT SERPL-MCNC: 2.72 MG/DL (ref 0.6–1.3)
ERYTHROCYTE [DISTWIDTH] IN BLOOD BY AUTOMATED COUNT: 15.6 % (ref 11.6–15.1)
GFR SERPL CREATININE-BSD FRML MDRD: 21 ML/MIN/1.73SQ M
GLUCOSE SERPL-MCNC: 184 MG/DL (ref 65–140)
GLUCOSE SERPL-MCNC: 203 MG/DL (ref 65–140)
GLUCOSE SERPL-MCNC: 232 MG/DL (ref 65–140)
GLUCOSE SERPL-MCNC: 235 MG/DL (ref 65–140)
GLUCOSE SERPL-MCNC: 238 MG/DL (ref 65–140)
GLUCOSE SERPL-MCNC: 313 MG/DL (ref 65–140)
HCT VFR BLD AUTO: 25.6 % (ref 36.5–49.3)
HEMOCCULT STL QL: ABNORMAL
HEMOCCULT STL QL: ABNORMAL
HEMOCCULT STL QL: POSITIVE
HGB BLD-MCNC: 7.9 G/DL (ref 12–17)
MCH RBC QN AUTO: 27.3 PG (ref 26.8–34.3)
MCHC RBC AUTO-ENTMCNC: 30.9 G/DL (ref 31.4–37.4)
MCV RBC AUTO: 89 FL (ref 82–98)
PLATELET # BLD AUTO: 140 THOUSANDS/UL (ref 149–390)
PMV BLD AUTO: 10.4 FL (ref 8.9–12.7)
POTASSIUM SERPL-SCNC: 4.6 MMOL/L (ref 3.5–5.3)
RBC # BLD AUTO: 2.89 MILLION/UL (ref 3.88–5.62)
SODIUM SERPL-SCNC: 139 MMOL/L (ref 135–147)
WBC # BLD AUTO: 10.26 THOUSAND/UL (ref 4.31–10.16)

## 2022-10-19 PROCEDURE — 94760 N-INVAS EAR/PLS OXIMETRY 1: CPT

## 2022-10-19 PROCEDURE — 94640 AIRWAY INHALATION TREATMENT: CPT

## 2022-10-19 PROCEDURE — 80048 BASIC METABOLIC PNL TOTAL CA: CPT | Performed by: INTERNAL MEDICINE

## 2022-10-19 PROCEDURE — 99223 1ST HOSP IP/OBS HIGH 75: CPT | Performed by: INTERNAL MEDICINE

## 2022-10-19 PROCEDURE — 99233 SBSQ HOSP IP/OBS HIGH 50: CPT | Performed by: INTERNAL MEDICINE

## 2022-10-19 PROCEDURE — 85027 COMPLETE CBC AUTOMATED: CPT | Performed by: PHYSICIAN ASSISTANT

## 2022-10-19 PROCEDURE — 86901 BLOOD TYPING SEROLOGIC RH(D): CPT | Performed by: NURSE PRACTITIONER

## 2022-10-19 PROCEDURE — 86923 COMPATIBILITY TEST ELECTRIC: CPT

## 2022-10-19 PROCEDURE — 86850 RBC ANTIBODY SCREEN: CPT | Performed by: NURSE PRACTITIONER

## 2022-10-19 PROCEDURE — 99232 SBSQ HOSP IP/OBS MODERATE 35: CPT | Performed by: PHYSICIAN ASSISTANT

## 2022-10-19 PROCEDURE — 86900 BLOOD TYPING SEROLOGIC ABO: CPT | Performed by: NURSE PRACTITIONER

## 2022-10-19 PROCEDURE — 82272 OCCULT BLD FECES 1-3 TESTS: CPT | Performed by: PHYSICIAN ASSISTANT

## 2022-10-19 PROCEDURE — 82948 REAGENT STRIP/BLOOD GLUCOSE: CPT

## 2022-10-19 PROCEDURE — 99232 SBSQ HOSP IP/OBS MODERATE 35: CPT | Performed by: INTERNAL MEDICINE

## 2022-10-19 PROCEDURE — 80053 COMPREHEN METABOLIC PANEL: CPT | Performed by: NURSE PRACTITIONER

## 2022-10-19 PROCEDURE — 85027 COMPLETE CBC AUTOMATED: CPT | Performed by: NURSE PRACTITIONER

## 2022-10-19 RX ORDER — INSULIN ASPART 100 [IU]/ML
35 INJECTION, SUSPENSION SUBCUTANEOUS
Status: DISCONTINUED | OUTPATIENT
Start: 2022-10-20 | End: 2022-10-21

## 2022-10-19 RX ADMIN — LEVALBUTEROL HYDROCHLORIDE 1.25 MG: 1.25 SOLUTION, CONCENTRATE RESPIRATORY (INHALATION) at 19:34

## 2022-10-19 RX ADMIN — INSULIN LISPRO 1 UNITS: 100 INJECTION, SOLUTION INTRAVENOUS; SUBCUTANEOUS at 17:46

## 2022-10-19 RX ADMIN — PREDNISONE 40 MG: 20 TABLET ORAL at 09:12

## 2022-10-19 RX ADMIN — LIDOCAINE 5% 1 PATCH: 700 PATCH TOPICAL at 09:21

## 2022-10-19 RX ADMIN — ISOSORBIDE MONONITRATE 30 MG: 30 TABLET, EXTENDED RELEASE ORAL at 09:11

## 2022-10-19 RX ADMIN — GABAPENTIN 100 MG: 100 CAPSULE ORAL at 21:30

## 2022-10-19 RX ADMIN — INSULIN ASPART 50 UNITS: 100 INJECTION, SUSPENSION SUBCUTANEOUS at 09:17

## 2022-10-19 RX ADMIN — FUROSEMIDE 40 MG: 40 TABLET ORAL at 09:13

## 2022-10-19 RX ADMIN — FLUTICASONE FUROATE AND VILANTEROL TRIFENATATE 1 PUFF: 100; 25 POWDER RESPIRATORY (INHALATION) at 09:22

## 2022-10-19 RX ADMIN — SALINE NASAL SPRAY 1 SPRAY: 1.5 SOLUTION NASAL at 09:24

## 2022-10-19 RX ADMIN — PANTOPRAZOLE SODIUM 40 MG: 40 TABLET, DELAYED RELEASE ORAL at 05:16

## 2022-10-19 RX ADMIN — ISODIUM CHLORIDE 3 ML: 0.03 SOLUTION RESPIRATORY (INHALATION) at 13:19

## 2022-10-19 RX ADMIN — ISODIUM CHLORIDE 3 ML: 0.03 SOLUTION RESPIRATORY (INHALATION) at 07:28

## 2022-10-19 RX ADMIN — TAMSULOSIN HYDROCHLORIDE 0.4 MG: 0.4 CAPSULE ORAL at 17:44

## 2022-10-19 RX ADMIN — LEVALBUTEROL HYDROCHLORIDE 1.25 MG: 1.25 SOLUTION, CONCENTRATE RESPIRATORY (INHALATION) at 13:18

## 2022-10-19 RX ADMIN — MUPIROCIN: 20 OINTMENT TOPICAL at 09:24

## 2022-10-19 RX ADMIN — LEVALBUTEROL HYDROCHLORIDE 1.25 MG: 1.25 SOLUTION, CONCENTRATE RESPIRATORY (INHALATION) at 07:28

## 2022-10-19 RX ADMIN — UMECLIDINIUM 1 PUFF: 62.5 AEROSOL, POWDER ORAL at 09:22

## 2022-10-19 RX ADMIN — NICOTINE 7 MG: 7 PATCH, EXTENDED RELEASE TRANSDERMAL at 09:11

## 2022-10-19 RX ADMIN — ISODIUM CHLORIDE 3 ML: 0.03 SOLUTION RESPIRATORY (INHALATION) at 19:34

## 2022-10-19 RX ADMIN — GUAIFENESIN 600 MG: 600 TABLET, EXTENDED RELEASE ORAL at 09:12

## 2022-10-19 RX ADMIN — GUAIFENESIN 600 MG: 600 TABLET, EXTENDED RELEASE ORAL at 17:44

## 2022-10-19 RX ADMIN — ASPIRIN 81 MG: 81 TABLET, COATED ORAL at 09:12

## 2022-10-19 RX ADMIN — GABAPENTIN 100 MG: 100 CAPSULE ORAL at 17:44

## 2022-10-19 RX ADMIN — INSULIN ASPART 40 UNITS: 100 INJECTION, SUSPENSION SUBCUTANEOUS at 11:51

## 2022-10-19 RX ADMIN — INSULIN LISPRO 3 UNITS: 100 INJECTION, SOLUTION INTRAVENOUS; SUBCUTANEOUS at 09:17

## 2022-10-19 RX ADMIN — CYCLOBENZAPRINE HYDROCHLORIDE 10 MG: 10 TABLET, FILM COATED ORAL at 09:15

## 2022-10-19 RX ADMIN — INSULIN LISPRO 2 UNITS: 100 INJECTION, SOLUTION INTRAVENOUS; SUBCUTANEOUS at 21:30

## 2022-10-19 RX ADMIN — PRAVASTATIN SODIUM 80 MG: 80 TABLET ORAL at 17:43

## 2022-10-19 RX ADMIN — POLYETHYLENE GLYCOL 3350, SODIUM SULFATE ANHYDROUS, SODIUM BICARBONATE, SODIUM CHLORIDE, POTASSIUM CHLORIDE 4000 ML: 236; 22.74; 6.74; 5.86; 2.97 POWDER, FOR SOLUTION ORAL at 17:42

## 2022-10-19 RX ADMIN — INSULIN LISPRO 2 UNITS: 100 INJECTION, SOLUTION INTRAVENOUS; SUBCUTANEOUS at 11:51

## 2022-10-19 RX ADMIN — GABAPENTIN 100 MG: 100 CAPSULE ORAL at 09:12

## 2022-10-19 RX ADMIN — OXYMETAZOLINE HYDROCHLORIDE 2 SPRAY: 0.05 SPRAY NASAL at 09:24

## 2022-10-19 RX ADMIN — SALINE NASAL SPRAY 1 SPRAY: 1.5 SOLUTION NASAL at 21:30

## 2022-10-19 RX ADMIN — FERROUS SULFATE TAB 325 MG (65 MG ELEMENTAL FE) 325 MG: 325 (65 FE) TAB at 09:12

## 2022-10-19 RX ADMIN — CARVEDILOL 6.25 MG: 6.25 TABLET, FILM COATED ORAL at 09:12

## 2022-10-19 RX ADMIN — SALINE NASAL SPRAY 1 SPRAY: 1.5 SOLUTION NASAL at 11:53

## 2022-10-19 RX ADMIN — INSULIN ASPART 30 UNITS: 100 INJECTION, SUSPENSION SUBCUTANEOUS at 17:51

## 2022-10-19 NOTE — CONSULTS
OTOLARYNGOLOGY CONSULT    Date of Service: 10/19/2022    Reason for consult: epistaxis    ASSESSMENT/PLAN:  Pb Miranda is a 78 y o  male who we are consulted on for intermittent epistaxis on eliquis without any active epistaxis     - history of blood in mucus when blowing nose without any true epistaxis would not explain drop in hemoglobin observed  - Ocean spray TID  - Afrin BID x 3 days  - bactroban BID for two weeks, then 2-3 times a week after that  - humidified oxygen ONLY   - avoid placing any tissues in nose  - in case of bleed, place small cotton ball soaked in afrin in nose on the side of bleed, hold pressure for 10 mins, repeat if still bleeding  - ENT will sign off at this time     HPI  77yoM who is admitted to the hospital for acute on chronic respiratory failure  He was recently started on eliquis this month, and his prior hgb has been in the 12 range  During this admission, it has been slowly trending down  Most recent value 7 9  patient reports that he has not had any true nosebleeds, only a little blood in mucus with blowing nose since starting sprays and ointment yesterday he has not noticed any blood in his mucus  No hx of epistaxis  No family hx of bleeding       [facility] MEDICATIONS  Current Facility-Administered Medications   Medication Dose Route Frequency Provider Last Rate Last Admin   • acetaminophen (TYLENOL) tablet 650 mg  650 mg Oral Q6H PRN Bin Broussard MD       • albuterol inhalation solution 2 5 mg  2 5 mg Nebulization Q4H PRN Nabila Dior MD       • aspirin (ECOTRIN LOW STRENGTH) EC tablet 81 mg  81 mg Oral Daily Bin Broussard MD   81 mg at 10/18/22 0858   • carvedilol (COREG) tablet 6 25 mg  6 25 mg Oral BID With Meals Maxim Toledo PA-C       • cyclobenzaprine (FLEXERIL) tablet 10 mg  10 mg Oral BID PRN Bin Broussard MD   10 mg at 10/17/22 0041   • ferrous sulfate tablet 325 mg  325 mg Oral Daily With Breakfast Sebas Jj MD   325 mg at 10/18/22 0858   • fluticasone-vilanterol (BREO ELLIPTA) 100-25 mcg/inh inhaler 1 puff  1 puff Inhalation Daily Laura Still MD   1 puff at 10/18/22 0902   • furosemide (LASIX) tablet 40 mg  40 mg Oral Daily Laura Still MD   40 mg at 10/18/22 9761   • gabapentin (NEURONTIN) capsule 100 mg  100 mg Oral TID Laura Still MD   100 mg at 10/18/22 2207   • guaiFENesin (MUCINEX) 12 hr tablet 600 mg  600 mg Oral BID Laura Still MD   600 mg at 10/18/22 1721   • hydrALAZINE (APRESOLINE) injection 5 mg  5 mg Intravenous Q6H PRN Ninoska Purdy PA-C       • insulin aspart protamine-insulin aspart (NovoLOG 70/30) 100 units/mL subcutaneous injection 30 Units  30 Units Subcutaneous Before Glocuba Yost MD   30 Units at 10/18/22 1721   • insulin aspart protamine-insulin aspart (NovoLOG 70/30) 100 units/mL subcutaneous injection 40 Units  40 Units Subcutaneous Daily Before Lunch Laura Still MD   40 Units at 10/18/22 1159   • insulin aspart protamine-insulin aspart (NovoLOG 70/30) 100 units/mL subcutaneous injection 50 Units  50 Units Subcutaneous Daily With Breakfast Laura Still MD   50 Units at 10/18/22 0857   • insulin lispro (HumaLOG) 100 units/mL subcutaneous injection 1-5 Units  1-5 Units Subcutaneous HS Laura Still MD   1 Units at 10/18/22 2213   • insulin lispro (HumaLOG) 100 units/mL subcutaneous injection 1-6 Units  1-6 Units Subcutaneous TID AC Sebas Watkins MD   6 Units at 10/18/22 1201   • isosorbide mononitrate (IMDUR) 24 hr tablet 30 mg  30 mg Oral Daily Siobhan Anders DO       • levalbuterol Rebekah Dallas) inhalation solution 1 25 mg  1 25 mg Nebulization TID Laura Still MD   1 25 mg at 10/18/22 1901    And   • sodium chloride 0 9 % inhalation solution 3 mL  3 mL Nebulization TID Laura Still MD   3 mL at 10/18/22 1901   • lidocaine (LIDODERM) 5 % patch 1 patch  1 patch Topical Daily Laura Still MD   1 patch at 10/18/22 0858   • mupirocin (BACTROBAN) 2 % ointment   Topical BID Karthik Soriano MD   Given at 10/18/22 1722   • nicotine (NICODERM CQ) 7 mg/24hr TD 24 hr patch 7 mg  7 mg Transdermal Daily Hoang Jacob MD   7 mg at 10/18/22 0858   • oxymetazoline (AFRIN) 0 05 % nasal spray 2 spray  2 spray Each Nare Q12H [facility] Marcia Workman PA-C   2 spray at 10/18/22 1203   • pantoprazole (PROTONIX) EC tablet 40 mg  40 mg Oral Early Morning Hoang Jacob MD   40 mg at 10/19/22 0516   • pravastatin (PRAVACHOL) tablet 80 mg  80 mg Oral Daily With Dionne Munson MD   80 mg at 10/18/22 1721   • predniSONE tablet 40 mg  40 mg Oral Daily Johnna Day MD   40 mg at 10/18/22 4042   • sodium chloride (OCEAN) 0 65 % nasal spray 1 spray  1 spray Each Nare 4x Daily Karthik Soriano MD   1 spray at 10/18/22 1721   • tamsulosin (FLOMAX) capsule 0 4 mg  0 4 mg Oral Daily With Dionne Munson MD   0 4 mg at 10/18/22 1721   • umeclidinium bromide (INCRUSE ELLIPTA) 62 5 mcg/inh inhaler AEPB 1 puff  1 puff Inhalation Daily Hoang Jacob MD   1 puff at 10/18/22 0902       REVIEW OF SYSTEMS  As above    HISTORIES  PMH:  Past Medical History:   Diagnosis Date   • Abdominal pain    • MARANDA (acute kidney [facility]) ([facility] ) 6/19/2018   • Cardiac disease    • CHF (congestive heart failure) ([facility])    • COPD, severe ([facility] )    • Coronary artery disease    • DDD (degenerative disc disease), lumbar 9/1/2020   • Diabetes mellitus ([facility] )    • Dyspnea    • GERD (gastroesophageal reflux disease)    • Hyperlipidemia    • Hypertension    • MI (myocardial infarction) ([facility] )     with 3 stents   • Nodule of apex of right lung    • JACQUELINE (obstructive sleep apnea)    • Prostate cancer (HCC)        PSH:  Past Surgical History:   Procedure Laterality Date   • ABDOMINAL SURGERY      exploratory   • ANGIOPLASTY      3 stents   • APPENDECTOMY     • COLONOSCOPY  2015   • CT NEEDLE BIOPSY LUNG  11/3/2020   • ESOPHAGOGASTRODUODENOSCOPY N/A 10/2/2017    Procedure: ESOPHAGOGASTRODUODENOSCOPY (EGD); Surgeon: Merribeth Nissen, MD;  Location: [facility]; Service: Gastroenterology   • IR THORACENTESIS  11/3/2020   • KNEE CARTILAGE SURGERY     • OTHER SURGICAL HISTORY      stent placement   • PROSTATE SURGERY     • SKIN GRAFT      Basal cell CA back       SocHx:  Social History     Tobacco Use   • Smoking status: Former Smoker     Packs/day: 1 50     Years: 50 00     Pack years: 75 00     Start date: 38     Quit date: 2020     Years since quittin 2   • Smokeless tobacco: Never Used   Vaping Use   • Vaping Use: Never used   Substance Use Topics   • Alcohol use: Never   • Drug use: No       FH:  Family History   Problem Relation Age of Onset   • Heart disease Father    • Other Father         Mesothelioma        ALLERGIES:  Allergies   Allergen Reactions   • Crestor [Rosuvastatin] Other (See Comments)     Unknown     • Lisinopril GI Intolerance, Dizziness and Abdominal Pain   • Metformin GI Intolerance       PHYSICAL EXAM  Visit Vitals  /61 (BP Location: Right arm)   Pulse 63   Temp 97 6 °F (36 4 °C) (Oral)   Resp 18   Ht 6' (1 829 m)   Wt 85 kg (187 lb 6 3 oz)   SpO2 93%   BMI 25 41 kg/m²   Smoking Status Former Smoker   BSA 2 07 m²       General: NAD, AOx4  Eyes:  EOMI, PERRL  Ears:  External ears normal in appearance   Nose:  External appearance normal, no septal deviation R>L small septal irritations bilaterally no active bleeding   Oral cavity:  No trismus, no mass/lesions no blood   Neck: Trachea is midline; no thyroid nodules, Salivary glands symmetrical, no masses/abnormality on palpation  Lymph:  No cervical lymphadenopathy  Skin:  No obvious facial lesions  Neuro: Motor and sensory grossly intact  Face symmetrical, no obvious cranial nerve palsies,motor and sensory grossly intact, no focal deficits     Lungs:  Normal work of breathing, symmetrical chest expansion  Vascular: Well perfused      LABORATORY  Reviewed    PROCEDURES  none    RADIOLOGY  none    Patient Active Problem List    Diagnosis Date Noted   • Anemia 10/18/2022   • Hyperkalemia 10/17/2022   • A-fib ([place] [address] ) 10/10/2022   • Frequent hospital admissions 08/29/2022   • Medication management 08/21/2022   • Chest pain, musculoskeletal 08/21/2022   • Hypothermia 08/16/2022   • Epididymitis, bilateral 06/15/2022   • Chronic left-sided low back pain without sciatica 05/17/2022   • SOB (shortness of breath) 04/21/2022   • Stage 3b chronic kidney disease (CKD) ([place] [address] ) 01/29/2022   • History of prostate cancer 01/28/2022   • Adenocarcinoma of lung ([place] [address] ) 01/21/2022   • Fracture of navicular bone of left foot 12/12/2021   • Acute on chronic respiratory failure with hypoxia and hypercapnia ([place] [address] ) 04/15/2021   • PAD (peripheral artery disease) ([place] [address] ) 03/12/2021   • DDD (degenerative disc disease), lumbar 09/01/2020   • Anemia, iron deficiency 09/01/2020   • Lung nodule 07/28/2020   • Pulmonary hypertension ([place] [address] ) 07/30/2019   • JACQUELINE (obstructive sleep apnea) 07/30/2019   • COPD with acute exacerbation ([place] [address] ) 06/14/2019   • Oxygen dependent 09/12/2018   • Acute metabolic encephalopathy 08/79/7667   • RBBB 06/19/2018   • Chronic diastolic (congestive) heart failure ([facility] ) 05/23/2018   • CAD (coronary artery disease) 05/23/2018   • Chronic diastolic heart failure ([place] [address] ) 02/01/2018   • Pleural effusion on right 10/31/2017   • COPD, severe ([place] [address] ) 07/23/2017   • Diabetic neuropathy ([place] [address] ) 01/20/2017   • Essential hypertension 12/06/2016   • Venous stasis dermatitis of both lower extremities 11/15/2016   • Type 2 diabetes mellitus with hyperglycemia, with long-term current use of insulin ([place] [address] ) 11/13/2016   • Possible COPD exacerbation ([place] [address] ) 01/20/2016   • HLD (hyperlipidemia) 01/20/2016

## 2022-10-19 NOTE — ASSESSMENT & PLAN NOTE
Recent admission for COPD exacerbation, discharge on 10/15 with 5 days prednisone  Re-presents due to acute onset shortness of breath  On admission found to by hypoxic with respiratory acidosis  · CXR on admission: Stable appearance of volume loss and chronic opacities in the right   · Continued on prednisone 40 mg daily on admission -- continue through 10/19   · Continue with Breo/Incruse 100/25/62 5 daily  · Guaifenesin for expectoration 600mg twice daily  · Xopenex round-the-clock and p r n  Marissa Barrientos   · Pulm recs appreciated, do not suspect exacerbation but do recommend he complete prednisone through 10/19

## 2022-10-19 NOTE — ASSESSMENT & PLAN NOTE
· Noted, appears baseline Hgb has been around 12    Hgb has been slowly trending down and today 7 9   · Recently started on Eliquis this month with subsequent drop in Hgb  · Iron panel, B12, folate unremarkable  · FOBT pending   · Pt reports intermittent episodes of epistaxis which appears mild -- ENT evaluated patient, degree of anemia would not be explained with mild nosebleed, and has since resolved with Afrin/holding Eliquis   · They recommend Bactroban BID x 2 weeks, then 2-3x/week after that   · Holding Eliquis for now   · Will ask GI for evaluation

## 2022-10-19 NOTE — CONSULTS
Consultation - [facility]  Fly Hernandez  78 y o  male MRN: [id number]  Unit/Bed#: [facility] 627-01 Encounter: [id number]              Inpatient consult to gastroenterology     Performed by  Ryan Negrete MD     Authorized by Blaise Polk PA-C            Reason for Consult / Principal Problem:   Acute Anemia     ASSESSMENT AND PLAN:    77 yo M with hx of COPD on 2L baseline, Afib on eliquis, CAD and DM on insulin presenting with acute anemia concerning for GI bleed    Acute Anemia  Hgb upon presentation noted to be 10 2 and now hgb is 7 9 with normal platelets, normal iron panel, normal MCV  Bun elevated in the setting of MARANDA on CKD  Exam unremarkable with soft abd and no evidence of melena or active bleeding  Hx remarkable for previous gastric ulcers and chronic NSAID use and smoking  Patient is at high risk for gastric ulcers and may be bleeding from that area in the setting of new anticoagulation    - Cont hold Eliquis   - Will schedule for EGD and possible colonoscopy tmrw   - Will start bowel prep now   - Cont IV PPI 40mg BID   - Clear liquid diet and NPO at midnight   - Transfuse for Hgb < 7   - Place 2 large bore IVs for transfusion       ______________________________________________________________________    HPI:    77 yo M with hx of COPD on 2L baseline, Afib on eliquis, CAD and DM on insulin presenting with acute anemia  Patient presented to the hospital on 10/16 for acute on chronic repository failure 2/2 COPD exacerbation  While inpatient patient was noted to have a hgb drop from 12 to 7 9 over 3 days  Patient had small intermittent episodes of epistaxis that were present when blowing his nose yesterday however ENT was consulted and based on history and exam less likely acute anemia is from small epistaxis  Patient denies melena, hematochezia and hematemesis at this time   Patient previously in 2017 had an EGD and colonoscopy done for acute anemia and was found to have 9 small sessile adenomas in the ascending colon that were resected and 2 small non bleeding gastric ulcers that were clipped  Patient denies abd pain, nausea/vomiting  Patient is a lifelong smoker, denies alcohol use  Endorses daily NSAID use due to chronic back pain  REVIEW OF SYSTEMS:    CONSTITUTIONAL: Denies any fever, chills, rigors, and weight loss  HEENT: No earache or tinnitus  Denies hearing loss or visual disturbances  CARDIOVASCULAR: No chest pain or palpitations  RESPIRATORY: Denies any cough, hemoptysis, shortness of breath or dyspnea on exertion  GASTROINTESTINAL: As noted in the History of Present Illness  GENITOURINARY: No problems with urination  Denies any hematuria or dysuria  NEUROLOGIC: No dizziness or vertigo, denies headaches  MUSCULOSKELETAL: Denies any muscle or joint pain  SKIN: Denies skin rashes or itching  ENDOCRINE: Denies excessive thirst  Denies intolerance to heat or cold  PSYCHOSOCIAL: Denies depression or anxiety  Denies any recent memory loss  Historical Information   Past Medical History:   Diagnosis Date   • Abdominal pain    • MARANDA (acute kidney injury) ([facility] ) 6/19/2018   • Cardiac disease    • CHF (congestive heart failure) ([facility])    • COPD, severe (HCC)    • Coronary artery disease    • DDD (degenerative disc disease), lumbar 9/1/2020   • Diabetes mellitus ([facility] [address] )    • Dyspnea    • GERD (gastroesophageal reflux disease)    • Hyperlipidemia    • Hypertension    • MI (myocardial infarction) ([facility] )     with 3 stents   • Nodule of apex of right lung    • JACQUELINE (obstructive sleep apnea)    • Prostate cancer [facility])      Past Surgical History:   Procedure Laterality Date   • ABDOMINAL SURGERY      exploratory   • ANGIOPLASTY      3 stents   • APPENDECTOMY     • COLONOSCOPY  2015   • CT NEEDLE BIOPSY LUNG  11/3/2020   • ESOPHAGOGASTRODUODENOSCOPY N/A 10/2/2017    Procedure: ESOPHAGOGASTRODUODENOSCOPY (EGD); Surgeon: Julius Tavarez MD;  [facility];   Service: Gastroenterology   • IR THORACENTESIS  11/3/2020   • KNEE CARTILAGE SURGERY     • OTHER SURGICAL HISTORY      stent placement   • PROSTATE SURGERY     • SKIN GRAFT      Basal cell CA back     Social History   Social History     Substance and Sexual Activity   Alcohol Use Never     Social History     Substance and Sexual Activity   Drug Use No     Social History     Tobacco Use   Smoking Status Former Smoker   • Packs/day: 1 50   • Years: 50 00   • Pack years: 75 00   • Start date: 38   • Quit date: 2020   • Years since quittin 2   Smokeless Tobacco Never Used     Family History   Problem Relation Age of Onset   • Heart disease Father    • Other Father         Mesothelioma        Meds/Allergies     Medications Prior to Admission   Medication   • apixaban (ELIQUIS) 5 mg   • aspirin (ECOTRIN LOW STRENGTH) 81 mg EC tablet   • carvedilol (COREG) 6 25 mg tablet   • cyclobenzaprine (FLEXERIL) 10 mg tablet   • ferrous sulfate 325 (65 Fe) mg tablet   • fluticasone-umeclidinium-vilanterol (Trelegy Ellipta) 100-62 5-25 MCG/INH inhaler   • furosemide (LASIX) 40 mg tablet   • gabapentin (NEURONTIN) 100 mg capsule   • glucose blood test strip   • insulin lispro protamine-insulin lispro (HumaLOG Mix 75/25) 100 units/mL   • lidocaine (Lidoderm) 5 %   • pantoprazole (PROTONIX) 40 mg tablet   • potassium chloride (K-DUR,KLOR-CON) 20 mEq tablet   • simvastatin (ZOCOR) 40 mg tablet   • tamsulosin (FLOMAX) 0 4 mg   • umeclidinium bromide (INCRUSE ELLIPTA) 62 5 mcg/inh AEPB inhaler     Current Facility-Administered Medications   Medication Dose Route Frequency   • acetaminophen (TYLENOL) tablet 650 mg  650 mg Oral Q6H PRN   • albuterol inhalation solution 2 5 mg  2 5 mg Nebulization Q4H PRN   • aspirin (ECOTRIN LOW STRENGTH) EC tablet 81 mg  81 mg Oral Daily   • carvedilol (COREG) tablet 6 25 mg  6 25 mg Oral BID With Meals   • cyclobenzaprine (FLEXERIL) tablet 10 mg  10 mg Oral BID PRN   • ferrous sulfate tablet 325 mg  325 mg Oral Daily With Breakfast   • fluticasone-vilanterol (BREO ELLIPTA) 100-25 mcg/inh inhaler 1 puff  1 puff Inhalation Daily   • furosemide (LASIX) tablet 40 mg  40 mg Oral Daily   • gabapentin (NEURONTIN) capsule 100 mg  100 mg Oral TID   • guaiFENesin (MUCINEX) 12 hr tablet 600 mg  600 mg Oral BID   • hydrALAZINE (APRESOLINE) injection 5 mg  5 mg Intravenous Q6H PRN   • insulin aspart protamine-insulin aspart (NovoLOG 70/30) 100 units/mL subcutaneous injection 30 Units  30 Units Subcutaneous Before Dinner   • insulin aspart protamine-insulin aspart (NovoLOG 70/30) 100 units/mL subcutaneous injection 40 Units  40 Units Subcutaneous Daily Before Lunch   • insulin aspart protamine-insulin aspart (NovoLOG 70/30) 100 units/mL subcutaneous injection 50 Units  50 Units Subcutaneous Daily With Breakfast   • insulin lispro (HumaLOG) 100 units/mL subcutaneous injection 1-5 Units  1-5 Units Subcutaneous HS   • insulin lispro (HumaLOG) 100 units/mL subcutaneous injection 1-6 Units  1-6 Units Subcutaneous TID AC   • isosorbide mononitrate (IMDUR) 24 hr tablet 30 mg  30 mg Oral Daily   • levalbuterol (XOPENEX) inhalation solution 1 25 mg  1 25 mg Nebulization TID    And   • sodium chloride 0 9 % inhalation solution 3 mL  3 mL Nebulization TID   • lidocaine (LIDODERM) 5 % patch 1 patch  1 patch Topical Daily   • mupirocin (BACTROBAN) 2 % ointment   Topical BID   • nicotine (NICODERM CQ) 7 mg/24hr TD 24 hr patch 7 mg  7 mg Transdermal Daily   • oxymetazoline (AFRIN) 0 05 % nasal spray 2 spray  2 spray Each Nare Q12H DRAKE   • pantoprazole (PROTONIX) EC tablet 40 mg  40 mg Oral Early Morning   • pravastatin (PRAVACHOL) tablet 80 mg  80 mg Oral Daily With Dinner   • sodium chloride (OCEAN) 0 65 % nasal spray 1 spray  1 spray Each Nare 4x Daily   • tamsulosin (FLOMAX) capsule 0 4 mg  0 4 mg Oral Daily With Dinner   • umeclidinium bromide (INCRUSE ELLIPTA) 62 5 mcg/inh inhaler AEPB 1 puff  1 puff Inhalation Daily       Allergies Allergen Reactions   • Crestor [Rosuvastatin] Other (See Comments)     Unknown     • Lisinopril GI Intolerance, Dizziness and Abdominal Pain   • Metformin GI Intolerance           Objective     Blood pressure 120/100, pulse 63, temperature 97 6 °F (36 4 °C), temperature source Oral, resp  rate 18, height 6' (1 829 m), weight 85 kg (187 lb 6 3 oz), SpO2 94 %  Body mass index is 25 41 kg/m²  Intake/Output Summary (Last 24 hours) at 10/19/2022 1430  Last data filed at 10/19/2022 1201  Gross per 24 hour   Intake 240 ml   Output 2625 ml   Net -2385 ml         PHYSICAL EXAM:      General Appearance:   Alert, cooperative, no distress   HEENT:   Normocephalic, atraumatic, anicteric      Neck:  Supple, symmetrical, trachea midline   Lungs:   Clear to auscultation bilaterally; no rales, rhonchi or wheezing; respirations unlabored    Heart[de-identified]   Regular rate and rhythm; no murmur, rub, or gallop  Abdomen:   Soft, non-tender, non-distended; normal bowel sounds; no masses, no organomegaly    Genitalia:   Deferred    Rectal:   Deferred    Extremities:  No cyanosis, clubbing or edema    Pulses:  2+ and symmetric all extremities    Skin:  No jaundice, rashes, or lesions    Lymph nodes:  No palpable cervical lymphadenopathy        Lab Results:   No results displayed because visit has over 200 results  Imaging Studies: I have personally reviewed pertinent imaging studies

## 2022-10-19 NOTE — ASSESSMENT & PLAN NOTE
Lab Results   Component Value Date    EGFR 21 10/19/2022    EGFR 21 10/18/2022    EGFR 22 10/18/2022    CREATININE 2 72 (H) 10/19/2022    CREATININE 2 78 (H) 10/18/2022    CREATININE 2 68 (H) 10/18/2022     Evaluated by nephrology on recent admission, recent baseline has been in the low 2s and was discharged with creatinine of 2 6  · Creatinine is currently stable in mid-2s at this time   · Continue home dose diuretics   · Avoid nephrotoxins and hypotension   · Monitor BMP

## 2022-10-19 NOTE — PROGRESS NOTES
[facility] [facility]  Progress Note - Jesenia Wilson Sr  1945, 78 y o  male MRN: [id number]  Unit/Bed#: [facility] 627-01 Encounter: [id number]  Primary Care Provider: Yamilet Valenzuela MD   Date and time admitted to hospital: 10/16/2022  8:22 PM    * Acute on chronic respiratory failure with hypoxia and hypercapnia [facility])  Assessment & Plan  Presenting with complains of SOB  At baseline uses 2 L NC as needed however was acutely hypoxic on admission requirring 3-4 L NC   · VBG on admission: pH 7 23, CO2 69, O2 52, HCO3 28  · Currently, on room air with SpO2 low 90s at rest   · Monitor and titrate supplemental oxygen as needed   · Pulmonology input appreciated, do not suspect recurrent COPD exacerbation  Continue short course of PO Prednisone through 10/19  · Respiratory protocol     Anemia  Assessment & Plan  · Noted, appears baseline Hgb has been around 12    Hgb has been slowly trending down and today 7 9   · Recently started on Eliquis this month with subsequent drop in Hgb  · Iron panel, B12, folate unremarkable  · FOBT pending   · Pt reports intermittent episodes of epistaxis which appears mild -- ENT evaluated patient, degree of anemia would not be explained with mild nosebleed, and has since resolved with Afrin/holding Eliquis   · They recommend Bactroban BID x 2 weeks, then 2-3x/week after that   · Holding Eliquis for now   · Will ask GI for evaluation     Hyperkalemia  Assessment & Plan  Likely related to hyperglycemia and CKD   · Continue low potassium diet  · Machado Cake was discontinued   · Resolved with hyperkalemia medications   · Nephrology input appreciated -- will need outpt f/u      A-fib [facility])  Assessment & Plan  · On AC with Eliquis  -- hold for now given acute anemia/epistaxis   · Continue home dose Coreg     Stage 3b chronic kidney disease (CKD) [facility])  Assessment & Plan  Lab Results   Component Value Date    EGFR 21 10/19/2022    EGFR 21 10/18/2022    EGFR 22 10/18/2022 CREATININE 2 72 (H) 10/19/2022    CREATININE 2 78 (H) 10/18/2022    CREATININE 2 68 (H) 10/18/2022     Evaluated by nephrology on recent admission, recent baseline has been in the low 2s and was discharged with creatinine of 2 6  · Creatinine is currently stable in mid-2s at this time   · Continue home dose diuretics   · Avoid nephrotoxins and hypotension   · Monitor BMP     CAD (coronary artery disease)  Assessment & Plan  Status post prior stenting, presenting with SOB as above but denies chest pain (though reported to other providers he did have chest pain)   · NM stress test in May 2022 unremarkable   · Troponin trend negative and EKG without ischemic changes   · Continue aspirin, statin, BB   · Cardiology input appreciated, do not recommend any additional ischemic work up at this time, added Imdur     Chronic diastolic (congestive) heart failure ([facility] [address] )  Assessment & Plan  Wt Readings from Last 3 Encounters:   10/19/22 85 kg (187 lb 6 3 oz)   10/15/22 98 7 kg (217 lb 11 1 oz)   09/17/22 101 kg (222 lb)   · Currently examines euvolemic   · Most recent echo with LVEF 60%  · Continue home dose Coreg and Lasix  · Monitor volume status     Essential hypertension  Assessment & Plan  · Hypertensive urgency on admission, now improved   · Continue home dose Coreg and Lasix  · Monitor routinely     Type 2 diabetes mellitus with hyperglycemia, with long-term current use of insulin [facility])  Assessment & Plan  Lab Results   Component Value Date    HGBA1C 9 7 (H) 10/10/2022       Recent Labs     10/18/22  2030 10/18/22  2205 10/19/22  0820 10/19/22  1150   POCGLU 188* 174* 238* 203*       Blood Sugar Average: Last 72 hrs:  (P) 132 [id number]   · Patient reporting labile blood sugars since discharge, as high as in the 300s and as low as 20  · Recently discharged on new dosing regimen of Humalog 75/25 - 50 units, 40 units, 30 units with breakfast lunch and dinner respectively  · Appreciate endocrinology consult and recommendations  · Carb controlled diet   · Hypoglycemia protocol     HLD (hyperlipidemia)  Assessment & Plan  · On pravastatin 80 mg daily    Possible COPD exacerbation ([address] )  Assessment & Plan  Recent admission for COPD exacerbation, discharge on 10/15 with 5 days prednisone  Re-presents due to acute onset shortness of breath  On admission found to by hypoxic with respiratory acidosis  · CXR on admission: Stable appearance of volume loss and chronic opacities in the right   · Continued on prednisone 40 mg daily on admission -- continue through 10/19   · Continue with Breo/Incruse 100/25/62 5 daily  · Guaifenesin for expectoration 600mg twice daily  · Xopenex round-the-clock and p r n  Marissa Crate · Pulm recs appreciated, do not suspect exacerbation but do recommend he complete prednisone through 10/19         VTE Pharmacologic Prophylaxis: VTE Score: 7 High Risk (Score >/= 5) - Pharmacological DVT Prophylaxis Contraindicated  Sequential Compression Devices Ordered  Patient Centered Rounds: I performed bedside rounds with nursing staff today  Discussions with Specialists or Other Care Team Provider: RN     Education and Discussions with Family / Patient: Patient declined call to   Time Spent for Care: 20 minutes  More than 50% of total time spent on counseling and coordination of care as described above  Current Length of Stay: 3 day(s)  Current Patient Status: Inpatient   Certification Statement: The patient will continue to require additional inpatient hospital stay due to anemia work up   Discharge Plan: Anticipate discharge in 48-72 hrs to home      Code Status: Level 1 - Full Code    Subjective:   Patient has no acute complaints today, wants to go home     Objective:     Vitals:   Temp (24hrs), Av °F (36 7 °C), Min:97 6 °F (36 4 °C), Max:98 6 °F (37 °C)    Temp:  [97 6 °F (36 4 °C)-98 6 °F (37 °C)] 97 6 °F (36 4 °C)  HR:  [63-94] 63  Resp:  [16-18] 18  BP: (108-147)/() 120/100  SpO2: [90 %-100 %] 94 %  Body mass index is 25 41 kg/m²  Input and Output Summary (last 24 hours): Intake/Output Summary (Last 24 hours) at 10/19/2022 1410  Last data filed at 10/19/2022 1201  Gross per 24 hour   Intake 240 ml   Output 2625 ml   Net -2385 ml       Physical Exam:   Physical Exam  Vitals reviewed  Constitutional:       General: He is not in acute distress  Appearance: He is not toxic-appearing  HENT:      Head: Normocephalic and atraumatic  Eyes:      Extraocular Movements: Extraocular movements intact  Cardiovascular:      Rate and Rhythm: Normal rate and regular rhythm  Pulmonary:      Effort: Pulmonary effort is normal  No respiratory distress  Comments: Decreased BS b/l  Abdominal:      General: Bowel sounds are normal  There is no distension  Palpations: Abdomen is soft  Tenderness: There is no abdominal tenderness  Musculoskeletal:         General: Normal range of motion  Cervical back: Normal range of motion  Neurological:      General: No focal deficit present  Mental Status: He is alert and oriented to person, place, and time  Psychiatric:         Mood and Affect: Mood normal          Behavior: Behavior normal          Thought Content: Thought content normal           Additional Data:     Labs:  Results from last 7 days   Lab Units 10/19/22  0714 10/18/22  0157 10/17/22  1003 10/16/22  2032   WBC Thousand/uL 10 26*   < > 12 10* 14 13*   HEMOGLOBIN g/dL 7 9*   < > 9 9* 10 2*   HEMATOCRIT % 25 6*   < > 32 8* 32 3*   PLATELETS Thousands/uL 140*   < > 171 183   BANDS PCT %  --   --   --  1   NEUTROS PCT %  --   --  89*  --    LYMPHS PCT %  --   --  6*  --    LYMPHO PCT %  --   --   --  17   MONOS PCT %  --   --  3*  --    MONO PCT %  --   --   --  6   EOS PCT %  --   --  0 5    < > = values in this interval not displayed       Results from last 7 days   Lab Units 10/19/22  0531 10/17/22  1504 10/17/22  1003   SODIUM mmol/L 139   < > 137   POTASSIUM mmol/L 4 6   < > 5 7*   CHLORIDE mmol/L 105   < > 105   CO2 mmol/L 26   < > 25   BUN mg/dL 100*   < > 78*   CREATININE mg/dL 2 72*   < > 2 61*   ANION GAP mmol/L 8   < > 7   CALCIUM mg/dL 8 0*   < > 8 5   ALBUMIN g/dL  --   --  2 9*   TOTAL BILIRUBIN mg/dL  --   --  0 27   ALK PHOS U/L  --   --  62   ALT U/L  --   --  30   AST U/L  --   --  10   GLUCOSE RANDOM mg/dL 232*   < > 386*    < > = values in this interval not displayed  Results from last 7 days   Lab Units 10/19/22  1150 10/19/22  0820 10/18/22  2205 10/18/22  2030 10/18/22  1711 10/18/22  1108 10/18/22  0807 10/17/22  2317 10/17/22  2054 10/17/22  1636 10/17/22  1142 10/17/22  0750   POC GLUCOSE mg/dl 203* 238* 174* 188* 140 394* 227* 183* 237* 264* 457* 179*               Lines/Drains:  Invasive Devices  Report    Peripheral Intravenous Line  Duration           Peripheral IV 10/18/22 Left;Proximal;Ventral (anterior) Forearm <1 day                      Imaging: No pertinent imaging reviewed      Recent Cultures (last 7 days):         Last 24 Hours Medication List:   Current Facility-Administered Medications   Medication Dose Route Frequency Provider Last Rate   • acetaminophen  650 mg Oral Q6H PRN Maicol Ortega MD     • albuterol  2 5 mg Nebulization Q4H PRN Belkis Ziegler MD     • aspirin  81 mg Oral Daily Maicol Ortega MD     • carvedilol  6 25 mg Oral BID With Meals Aristides Hoang PA-C     • cyclobenzaprine  10 mg Oral BID PRN Maicol Ortega MD     • ferrous sulfate  325 mg Oral Daily With Breakfast Maicol Ortega MD     • fluticasone-vilanterol  1 puff Inhalation Daily Maicol Ortega MD     • furosemide  40 mg Oral Daily Maicol Ortega MD     • gabapentin  100 mg Oral TID Maicol Ortega MD     • guaiFENesin  600 mg Oral BID Maicol Ortega MD     • hydrALAZINE  5 mg Intravenous Q6H PRN CATRINA EscotoC     • insulin aspart protamine-insulin aspart  30 Units Subcutaneous Before Sophie Sanford MD     • insulin aspart protamine-insulin aspart  40 Units Subcutaneous Daily Before Lunch Joyce Mckeon MD     • insulin aspart protamine-insulin aspart  50 Units Subcutaneous Daily With Breakfast Joyce Mckeon MD     • insulin lispro  1-5 Units Subcutaneous HS Joyce Mckeon MD     • insulin lispro  1-6 Units Subcutaneous TID AC Joyce Mckeon MD     • isosorbide mononitrate  30 mg Oral Daily Bertram Huerta, DO     • levalbuterol  1 25 mg Nebulization TID Joyce Mckeon MD      And   • sodium chloride  3 mL Nebulization TID Joyce Mckeon MD     • lidocaine  1 patch Topical Daily Joyce Mckeon MD     • mupirocin   Topical BID Shmuel Taylor MD     • nicotine  7 mg Transdermal Daily Joyce Mckeon MD     • oxymetazoline  2 spray Each Nare Q12H [address] Marcia Workman PA-C     • pantoprazole  40 mg Oral Early Morning Joyce Mckeon MD     • pravastatin  80 mg Oral Daily With Sophie Sanford MD     • sodium chloride  1 spray Each Nare 4x Daily Shmuel Taylor MD     • tamsulosin  0 4 mg Oral Daily With Sophie Sanford MD     • umeclidinium bromide  1 puff Inhalation Daily Joyce Mckeon MD          Today, Patient Was Seen By: Sherry Osuna    **Please Note: This note may have been constructed using a voice recognition system  **

## 2022-10-19 NOTE — ASSESSMENT & PLAN NOTE
Status post prior stenting, presenting with SOB as above but denies chest pain (though reported to other providers he did have chest pain)   · NM stress test in May 2022 unremarkable   · Troponin trend negative and EKG without ischemic changes   · Continue aspirin, statin, BB   · Cardiology input appreciated, do not recommend any additional ischemic work up at this time, added Imdur

## 2022-10-19 NOTE — ASSESSMENT & PLAN NOTE
Wt Readings from Last 3 Encounters:   10/19/22 85 kg (187 lb 6 3 oz)   10/15/22 98 7 kg (217 lb 11 1 oz)   09/17/22 101 kg (222 lb)   · Currently examines euvolemic   · Most recent echo with LVEF 60%  · Continue home dose Coreg and Lasix  · Monitor volume status

## 2022-10-19 NOTE — ASSESSMENT & PLAN NOTE
Lab Results   Component Value Date    HGBA1C 9 7 (H) 10/10/2022       Recent Labs     10/18/22  2030 10/18/22  2205 10/19/22  0820 10/19/22  1150   POCGLU 188* 174* 238* 203*       Blood Sugar Average: Last 72 hrs:  (P) 388 [id number]   · Patient reporting labile blood sugars since discharge, as high as in the 300s and as low as 20  · Recently discharged on new dosing regimen of Humalog 75/25 - 50 units, 40 units, 30 units with breakfast lunch and dinner respectively  · Appreciate endocrinology consult and recommendations  · Carb controlled diet   · Hypoglycemia protocol

## 2022-10-19 NOTE — PROGRESS NOTES
Progress Note - Nephrology   Violet Napoleon Sr  78 y o  male MRN: [id number]  Unit/Bed#: [facility] 627-01 Encounter: [id number]      44-year-old male with significant medical issues of COPD, diabetes (uncontrolled), history of prostate cancer status post radiation seed treatment, diastolic CHF, CAD status post PCI with recent hospitalization shortness of breath treated with steroid now presents with shortness of breath and nephrology following for MARANDA and Chronic Kidney Disease management  ASSESSMENT and PLAN:  Acute kidney injury (POA):  On top of Chronic Kidney Disease IIIB/IV  MARANDA Etiology:  Previous MARANDA episode from last admission versus progression of Chronic Kidney Disease  CKD Etiology:  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, hypertension nephrosclerosis, chronic COPD and age-related nephron loss  Assessment:  • After review of medical records through [address] [place] [address] it appears that the patient had a baseline Creatinine of 1 4-1 7  • Since last hospitalization creatinine has plateaued between 2 6 in 2 8  • Current creatinine 2 72-overall remains stable and likely reuse resulting in new baseline  • Admission creatinine 2 93  • Previously on losartan-currently off  Workup:  · Bladder scan insignificant-66 mL  · Recent UA earlier this month revealed no hematuria or proteinuria  Plan:  • Avoid hypotension or perturbations of blood pressure to prevent decreased renal perfusion  • Avoid NSAIDs, nephrotoxins and limit IV contrast  • Patient requires hospital follow-up for Chronic Kidney Disease management-discussed with patient the importance of ongoing follow-up with advancing Chronic Kidney Disease-patient not his head and said yes  • Continue monitor BMP while admitted  • Continue to hold losartan    Blood pressure/Hypertension:  Assessment and Plan:  • Current blood pressure:   Acceptable  • Current medications include:  Carvedilol 6 25 mg 2 times daily, furosemide 40 mg daily, Imdur 30 mg daily,  • Maximize hemodynamics to maintain MAP >65  • Avoid hypotension or fluctuations in blood pressure  • Will continue to trend    Diastolic congestive heart failure  Assessment and Plan:  • Patient examining fairly euvolemic  • Not in respiratory distress-on room air, lungs are clear, no JVD  • Continue with furosemide 40 mg daily  • Given one dose IV on admission  • Continue monitor standing weights and accurate I&O  • Recommend no treatment change    Hyperkalemia  Assessment and Plan:  • Suspect secondary to uncontrolled hyperglycemia, dietary indiscretion, in the setting of advanced kidney disease  • Status post medical management and received Kayexalate yesterday  • Admission potassium 6 7 and currently 4 6  • Continue with low-potassium diet  • Encourage better glucose control  • Check BMP in a m  Anemia Chronic Kidney Disease  Assessment and Plan:  · Current hemoglobin 7 9  · Recent iron stores within normal limits iron saturations 23, iron 77  · Currently on oral iron  · With change in treatment  · Management per primary team    COPD:  With recent exacerbation treated with steroid  Assessment and plan  · Pulmonary following  · Now on tapering dose of steroid  · Remains on room air    Disposition:  Renal function remains stable and potassium has improved  Okay from Nephrology standpoint for discharge when medically stable per primary team   Patient will require outpatient follow-up for Chronic Kidney Disease management    Review of systems  Patient seen and examined at bedside:  Feels well wants to go home  Review of Systems   Constitutional: Negative  Negative for activity change, appetite change, chills, diaphoresis, fatigue and fever  HENT: Negative  Negative for congestion and facial swelling  Respiratory: Negative  Cardiovascular: Negative  Gastrointestinal: Negative  Endocrine: Negative  Genitourinary: Negative  Musculoskeletal: Negative  Skin: Negative  Allergic/Immunologic: Negative  Neurological: Negative  Hematological: Negative  Psychiatric/Behavioral: Negative  Physical exam:  Current Weight: Weight - Scale: 85 kg (187 lb 6 3 oz)  Vitals:    10/19/22 0600 10/19/22 0623 10/19/22 0728 10/19/22 0910   BP:  108/61  120/100   BP Location:  Right arm     Pulse:  63     Resp:  18     Temp:  97 6 °F (36 4 °C)     TempSrc:  Oral     SpO2:  93% 93%    Weight: 85 kg (187 lb 6 3 oz)      Height:           Intake/Output Summary (Last 24 hours) at 10/19/2022 1015  Last data filed at 10/19/2022 0554  Gross per 24 hour   Intake 360 ml   Output 2725 ml   Net -2365 ml       Physical Exam  Vitals and nursing note reviewed  Constitutional:       Appearance: Normal appearance  Comments: NAD, OOB   HENT:      Head: Normocephalic and atraumatic  Nose: Nose normal       Mouth/Throat:      Mouth: Mucous membranes are moist       Pharynx: Oropharynx is clear  Eyes:      Extraocular Movements: Extraocular movements intact  Conjunctiva/sclera: Conjunctivae normal    Cardiovascular:      Rate and Rhythm: Normal rate and regular rhythm  Pulses: Normal pulses  Heart sounds: Normal heart sounds  Pulmonary:      Effort: Pulmonary effort is normal       Breath sounds: Normal breath sounds  Abdominal:      General: Bowel sounds are normal       Palpations: Abdomen is soft  Musculoskeletal:      Cervical back: Normal range of motion and neck supple  Right lower leg: Edema present  Left lower leg: Edema present  Skin:     General: Skin is warm and dry  Neurological:      General: No focal deficit present  Mental Status: He is alert and oriented to person, place, and time  Psychiatric:         Mood and Affect: Mood normal          Behavior: Behavior normal          Thought Content:  Thought content normal          Judgment: Judgment normal             Medications:    Current Facility-Administered Medications:   • acetaminophen (TYLENOL) tablet 650 mg, 650 mg, Oral, Q6H PRN, Yue Webster MD  •  albuterol inhalation solution 2 5 mg, 2 5 mg, Nebulization, Q4H PRN, Consuelo Chan MD  •  aspirin (ECOTRIN LOW STRENGTH) EC tablet 81 mg, 81 mg, Oral, Daily, Sebas Davidson MD, 81 mg at 10/19/22 0912  •  carvedilol (COREG) tablet 6 25 mg, 6 25 mg, Oral, BID With Meals, Charlotte Rock PA-C, 6 25 mg at 10/19/22 9638  •  cyclobenzaprine (FLEXERIL) tablet 10 mg, 10 mg, Oral, BID PRN, Yue Webster MD, 10 mg at 10/19/22 0915  •  ferrous sulfate tablet 325 mg, 325 mg, Oral, Daily With Breakfast, Yue Webster MD, 325 mg at 10/19/22 0912  •  fluticasone-vilanterol (BREO ELLIPTA) 100-25 mcg/inh inhaler 1 puff, 1 puff, Inhalation, Daily, Yue Webster MD, 1 puff at 10/19/22 2641  •  furosemide (LASIX) tablet 40 mg, 40 mg, Oral, Daily, Yue Webster MD, 40 mg at 10/19/22 0913  •  gabapentin (NEURONTIN) capsule 100 mg, 100 mg, Oral, TID, Yue Webster MD, 100 mg at 10/19/22 0912  •  guaiFENesin (MUCINEX) 12 hr tablet 600 mg, 600 mg, Oral, BID, Yue Webster MD, 600 mg at 10/19/22 3268  •  hydrALAZINE (APRESOLINE) injection 5 mg, 5 mg, Intravenous, Q6H PRN, Charlotte Rock PA-C  •  insulin aspart protamine-insulin aspart (NovoLOG 70/30) 100 units/mL subcutaneous injection 30 Units, 30 Units, Subcutaneous, Before Stephanie Estrada MD, 30 Units at 10/18/22 1721  •  insulin aspart protamine-insulin aspart (NovoLOG 70/30) 100 units/mL subcutaneous injection 40 Units, 40 Units, Subcutaneous, Daily Before Lunch, Yue Webster MD, 40 Units at 10/18/22 1159  •  insulin aspart protamine-insulin aspart (NovoLOG 70/30) 100 units/mL subcutaneous injection 50 Units, 50 Units, Subcutaneous, Daily With Breakfast, Yue Webster MD, 50 Units at 10/19/22 0917  •  insulin lispro (HumaLOG) 100 units/mL subcutaneous injection 1-5 Units, 1-5 Units, Subcutaneous, Sebas Bing Dumont MD, 1 Units at 10/18/22 2213  •  insulin lispro (HumaLOG) 100 units/mL subcutaneous injection 1-6 Units, 1-6 Units, Subcutaneous, TID AC, 3 Units at 10/19/22 0917 **AND** Fingerstick Glucose (POCT), , , TID AC, Sebas Dumont MD  •  isosorbide mononitrate (IMDUR) 24 hr tablet 30 mg, 30 mg, Oral, Daily, Maria A Gell, DO, 30 mg at 10/19/22 0911  •  levalbuterol (XOPENEX) inhalation solution 1 25 mg, 1 25 mg, Nebulization, TID, 1 25 mg at 10/19/22 0728 **AND** sodium chloride 0 9 % inhalation solution 3 mL, 3 mL, Nebulization, TID, Manpreet Bruner MD, 3 mL at 10/19/22 0728  •  lidocaine (LIDODERM) 5 % patch 1 patch, 1 patch, Topical, Daily, Manpreet Bruner MD, 1 patch at 10/19/22 6546  •  mupirocin (BACTROBAN) 2 % ointment, , Topical, BID, Jenelle Potts MD, Given at 10/19/22 3535  •  nicotine (NICODERM CQ) 7 mg/24hr TD 24 hr patch 7 mg, 7 mg, Transdermal, Daily, Manpreet Bruner MD, 7 mg at 10/19/22 0911  •  oxymetazoline (AFRIN) 0 05 % nasal spray 2 spray, 2 spray, Each Nare, Q12H [facility], Marcia Workman PA-C, 2 spray at 10/19/22 0924  •  pantoprazole (PROTONIX) EC tablet 40 mg, 40 mg, Oral, Early Morning, Manpreet Bruner MD, 40 mg at 10/19/22 0516  •  pravastatin (PRAVACHOL) tablet 80 mg, 80 mg, Oral, Daily With Ugo Guy MD, 80 mg at 10/18/22 1721  •  sodium chloride (OCEAN) 0 65 % nasal spray 1 spray, 1 spray, Each Nare, 4x Daily, Jenelle Potts MD, 1 spray at 10/19/22 0969  •  tamsulosin (FLOMAX) capsule 0 4 mg, 0 4 mg, Oral, Daily With Ugo Guy MD, 0 4 mg at 10/18/22 1721  •  umeclidinium bromide (INCRUSE ELLIPTA) 62 5 mcg/inh inhaler AEPB 1 puff, 1 puff, Inhalation, Daily, Manpreet Bruner MD, 1 puff at 10/19/22 9075    Laboratory Results:  Results from last 7 days   Lab Units 10/19/22  0714 10/19/22  0531 10/18/22  0847 10/18/22  0157 10/18/22  0148 10/17/22  2031 10/17/22  1504 10/17/22  1003 10/16/22  2032 10/15/22  0611 10/14/22  0637 10/13/22  0646   WBC Thousand/uL 10 26*  --   --  8 81  --   --   --  12 10* 14 13* 6 23  --   --    HEMOGLOBIN g/dL 7 9*  --   --  7 9*  --   --   --  9 9* 10 2* 10 3*  --   --    HEMATOCRIT % 25 6*  --   --  24 9*  --   --   --  32 8* 32 3* 32 3*  --   --    PLATELETS Thousands/uL 140*  --   --  117*  --   --   --  171 183 122*  --   --    SODIUM mmol/L  --  139 136  --  135 134* 136 137 135 139   < > 137   POTASSIUM mmol/L  --  4 6 5 5*  --  5 7* 6 7* 6 4* 5 7* 5 9* 4 8   < > 4 5   CHLORIDE mmol/L  --  105 102  --  103 103 104 105 104 108   < > 107   CO2 mmol/L  --  26 25  --  29 27 25 25 28 25   < > 25   BUN mg/dL  --  100* 86*  --  90* 90* 82* 78* 90* 72*   < > 74*   CREATININE mg/dL  --  2 72* 2 78*  --  2 68* 2 78* 2 82* 2 61* 2 93* 2 66*   < > 2 81*   CALCIUM mg/dL  --  8 0* 8 5  --  8 4 8 3 8 6 8 5 8 2* 8 2*   < > 8 3   MAGNESIUM mg/dL  --   --   --   --   --   --   --  2 6  --   --   --   --    PHOSPHORUS mg/dL  --   --   --   --   --   --   --  5 2*  --   --   --  4 9*    < > = values in this interval not displayed

## 2022-10-19 NOTE — PROGRESS NOTES
Cardiology Progress Note - Liborio Mejía Sr  78 y o  male MRN: [id number]    Unit/Bed#: [facility] 627-01 Encounter: [id number]    Subjective:   Patient seen and examined  Denies chest pain, shortness of breath, palpitations, increase in extremity swelling, dizziness, additional complaints  Reports ambulating around room without recurrence of chest pain  Hospital Course:   Marques Thomas is a 78y o  year old male with a history of CAD s/p multiple stents, persistent A fib on eliquis (held for acute anemia), COPD, chronic diastolic HF EF 62%, CKD IIIB, DM on insulin, HLD, HTN, lung cancer, prostate cancer  He presented to the ED 10/16 with shortness of breath and chest pain at rest  Episode brought on by pain in back  This follows recent admission 10/10-10/15 for COPD exacerbation, multiple admissions this year  Cardiology consulted for shortness of breath and chest pain in setting of cardiac history  Patient started on 30 mg Imdur for potentially anginal symptoms described which occurred prior to admission, first dose this morning  Workup for acute anemia with Hb 7 9 from baseline 12 ongoing per primary  Vitals: Blood pressure 120/100, pulse 63, temperature 97 6 °F (36 4 °C), temperature source Oral, resp  rate 18, height 6' (1 829 m), weight 85 kg (187 lb 6 3 oz), SpO2 93 %  , Body mass index is 25 41 kg/m² ,   Orthostatic Blood Pressures    Flowsheet Row Most Recent Value   Blood Pressure 120/100 filed at 10/19/2022 3869   Patient Position - Orthostatic VS Sitting filed at 10/19/2022 9687            Intake/Output Summary (Last 24 hours) at 10/19/2022 0941  Last data filed at 10/19/2022 0554  Gross per 24 hour   Intake 360 ml   Output 2725 ml   Net -2365 ml       Review of System:  Review of system was conducted and was negative except for as stated in the subjective course      Physical Exam:    GEN: Liborio Dill Sr  appears well, alert and oriented x 3, pleasant and cooperative   HEENT: Normocephalic, atraumatic, anicteric, moist mucous membranes  NECK: No JVD  HEART: Regular rate, normal S1 and S2, no murmurs, clicks, gallops or rubs   LUNGS: Clear to auscultation bilaterally; no wheezes, rales, or rhonchi; respiration nonlabored on room air at time of exam    EXTREMITIES: Bilateral lower extremity edema to level of knee, slightly improved from yesterday  NEURO: Grossly intact  SKIN:  Discoloration/rash of both shins, chronic per patient       Current Facility-Administered Medications:   •  acetaminophen (TYLENOL) tablet 650 mg, 650 mg, Oral, Q6H PRN, Manuel Rodriguez MD  •  albuterol inhalation solution 2 5 mg, 2 5 mg, Nebulization, Q4H PRN, Evelina Gamble MD  •  aspirin (ECOTRIN LOW STRENGTH) EC tablet 81 mg, 81 mg, Oral, Daily, Manuel Rodriguez MD, 81 mg at 10/19/22 0912  •  carvedilol (COREG) tablet 6 25 mg, 6 25 mg, Oral, BID With Meals, Patt Kendrick PA-C, 6 25 mg at 10/19/22 9456  •  cyclobenzaprine (FLEXERIL) tablet 10 mg, 10 mg, Oral, BID PRN, Manuel Rodriguez MD, 10 mg at 10/19/22 0915  •  ferrous sulfate tablet 325 mg, 325 mg, Oral, Daily With Breakfast, Manuel Rodriguez MD, 325 mg at 10/19/22 0912  •  fluticasone-vilanterol (BREO ELLIPTA) 100-25 mcg/inh inhaler 1 puff, 1 puff, Inhalation, Daily, Manuel Rodriguez MD, 1 puff at 10/19/22 4715  •  furosemide (LASIX) tablet 40 mg, 40 mg, Oral, Daily, Manuel Rodriguez MD, 40 mg at 10/19/22 0913  •  gabapentin (NEURONTIN) capsule 100 mg, 100 mg, Oral, TID, Manuel Rodriguez MD, 100 mg at 10/19/22 0912  •  guaiFENesin (MUCINEX) 12 hr tablet 600 mg, 600 mg, Oral, BID, Manuel Rodriguez MD, 600 mg at 10/19/22 1366  •  hydrALAZINE (APRESOLINE) injection 5 mg, 5 mg, Intravenous, Q6H PRN, Patt Kendrick PA-C  •  insulin aspart protamine-insulin aspart (NovoLOG 70/30) 100 units/mL subcutaneous injection 30 Units, 30 Units, Subcutaneous, Before Ella Chavez MD, 30 Units at 10/18/22 1721  • insulin aspart protamine-insulin aspart (NovoLOG 70/30) 100 units/mL subcutaneous injection 40 Units, 40 Units, Subcutaneous, Daily Before Lunch, Camelia Parham MD, 40 Units at 10/18/22 1159  •  insulin aspart protamine-insulin aspart (NovoLOG 70/30) 100 units/mL subcutaneous injection 50 Units, 50 Units, Subcutaneous, Daily With Breakfast, Camelia Parham MD, 50 Units at 10/19/22 0917  •  insulin lispro (HumaLOG) 100 units/mL subcutaneous injection 1-5 Units, 1-5 Units, Subcutaneous, HS, Camelia Parham MD, 1 Units at 10/18/22 2213  •  insulin lispro (HumaLOG) 100 units/mL subcutaneous injection 1-6 Units, 1-6 Units, Subcutaneous, TID AC, 3 Units at 10/19/22 0917 **AND** Fingerstick Glucose (POCT), , , TID AC, Sebas Lindquist MD  •  isosorbide mononitrate (IMDUR) 24 hr tablet 30 mg, 30 mg, Oral, Daily, Virgel Fort, DO, 30 mg at 10/19/22 0911  •  levalbuterol (XOPENEX) inhalation solution 1 25 mg, 1 25 mg, Nebulization, TID, 1 25 mg at 10/19/22 0728 **AND** sodium chloride 0 9 % inhalation solution 3 mL, 3 mL, Nebulization, TID, Camelia Parham MD, 3 mL at 10/19/22 0728  •  lidocaine (LIDODERM) 5 % patch 1 patch, 1 patch, Topical, Daily, Camelia Parham MD, 1 patch at 10/19/22 7086  •  mupirocin (BACTROBAN) 2 % ointment, , Topical, BID, Giovanna Cornelius MD, Given at 10/19/22 0790  •  nicotine (NICODERM CQ) 7 mg/24hr TD 24 hr patch 7 mg, 7 mg, Transdermal, Daily, Camelia Parham MD, 7 mg at 10/19/22 0911  •  oxymetazoline (AFRIN) 0 05 % nasal spray 2 spray, 2 spray, Each Nare, Q12H [facility] & longterm, Marcia Workmna PA-C, 2 spray at 10/19/22 0924  •  pantoprazole (PROTONIX) EC tablet 40 mg, 40 mg, Oral, Early Morning, Camelia Parham MD, 40 mg at 10/19/22 0516  •  pravastatin (PRAVACHOL) tablet 80 mg, 80 mg, Oral, Daily With Dianna Gonzalez MD, 80 mg at 10/18/22 1721  •  sodium chloride (OCEAN) 0 65 % nasal spray 1 spray, 1 spray, Each Nare, 4x Daily, Augusto Bustamante Ankit Galvan MD, 1 spray at 10/19/22 4710  •  tamsulosin (FLOMAX) capsule 0 4 mg, 0 4 mg, Oral, Daily With Dinner, Jesse Rodriguez MD, 0 4 mg at 10/18/22 1721  •  umeclidinium bromide (INCRUSE ELLIPTA) 62 5 mcg/inh inhaler AEPB 1 puff, 1 puff, Inhalation, Daily, Jesse Rodriguez MD, 1 puff at 10/19/22 0922    Labs & Results:  Results from last 7 days   Lab Units 10/17/22  0109 10/16/22  2211 10/16/22  2032   HS TNI 0HR ng/L  --   --  40   HS TNI 2HR ng/L  --  38  --    HS TNI 4HR ng/L 40  --   --          Results from last 7 days   Lab Units 10/19/22  0531 10/18/22  0847 10/18/22  0148   POTASSIUM mmol/L 4 6 5 5* 5 7*   CO2 mmol/L 26 25 29   CHLORIDE mmol/L 105 102 103   BUN mg/dL 100* 86* 90*   CREATININE mg/dL 2 72* 2 78* 2 68*     Results from last 7 days   Lab Units 10/19/22  0714 10/18/22  0157 10/17/22  1003   HEMOGLOBIN g/dL 7 9* 7 9* 9 9*   HEMATOCRIT % 25 6* 24 9* 32 8*   PLATELETS Thousands/uL 140* 117* 171             VTE Prophylaxis: Sequential compression device (Venodyne)        Assessment:  Principal Problem:    Acute on chronic respiratory failure with hypoxia and hypercapnia (HCC)  Active Problems:    Possible COPD exacerbation (HCC)    HLD (hyperlipidemia)    Type 2 diabetes mellitus with hyperglycemia, with long-term current use of insulin (HCC)    Essential hypertension    Chronic diastolic (congestive) heart failure (HCC)    CAD (coronary artery disease)    Stage 3b chronic kidney disease (CKD) (HCC)    A-fib (HCC)    Hyperkalemia    Anemia    78 yom with history of CAD s/p multiple stents, persistent A fib on eliquis (held for acute anemia), COPD, chronic diastolic HF EF 58%, CKD IIIB, DM on insulin, HLD, HTN, lung cancer, prostate cancer  Admitted for back pain, shortness of breath, chest pain  Reports chronic shortness of breath and chest pain/heaviness   Will not pursue further ischemic workup at this time in setting of normal stress test from May 2022, acute anemia, elevated creatinine  Plan:    - Continue Imdur 30 mg daily for potentially anginal chest pain   - Continue Lasix 40 mg daily  - Continue Coreg 6 25 mg BID  - Eliquis for A fib held in setting of acute anemia   - Anemia workup per primary  - No further recommendations at this time  - Follow up outpatient cardiology     Thank you for involving us in the care of your patient      ---  Damaris Mcmahon, M4  Cardiology

## 2022-10-19 NOTE — PLAN OF CARE
Problem: Potential for Falls  Goal: Patient will remain free of falls  Description: INTERVENTIONS:  - Educate patient/family on patient safety including physical limitations  - Instruct patient to call for assistance with activity   - Consult OT/PT to assist with strengthening/mobility   - Keep Call bell within reach  - Keep bed low and locked with side rails adjusted as appropriate  - Keep care items and personal belongings within reach  - Initiate and maintain comfort rounds  - Make Fall Risk Sign visible to staff  - Apply yellow socks and bracelet for high fall risk patients  - Consider moving patient to room near nurses station  Outcome: Progressing     Problem: PAIN - ADULT  Goal: Verbalizes/displays adequate comfort level or baseline comfort level  Description: Interventions:  - Encourage patient to monitor pain and request assistance  - Assess pain using appropriate pain scale  - Administer analgesics based on type and severity of pain and evaluate response  - Implement non-pharmacological measures as appropriate and evaluate response  - Consider cultural and social influences on pain and pain management  - Notify physician/advanced practitioner if interventions unsuccessful or patient reports new pain  Outcome: Progressing     Problem: INFECTION - ADULT  Goal: Absence or prevention of progression during hospitalization  Description: INTERVENTIONS:  - Assess and monitor for signs and symptoms of infection  - Monitor lab/diagnostic results  - Monitor all insertion sites, i e  indwelling lines, tubes, and drains  - Monitor endotracheal if appropriate and nasal secretions for changes in amount and color  - Supply appropriate cooling/warming therapies per order  - Administer medications as ordered  - Instruct and encourage patient and family to use good hand hygiene technique  - Identify and instruct in appropriate isolation precautions for identified infection/condition  Outcome: Progressing     Problem: SAFETY ADULT  Goal: Patient will remain free of falls  Description: INTERVENTIONS:  - Educate patient/family on patient safety including physical limitations  - Instruct patient to call for assistance with activity   - Consult OT/PT to assist with strengthening/mobility   - Keep Call bell within reach  - Keep bed low and locked with side rails adjusted as appropriate  - Keep care items and personal belongings within reach  - Initiate and maintain comfort rounds  - Make Fall Risk Sign visible to staff  - Apply yellow socks and bracelet for high fall risk patients  - Consider moving patient to room near nurses station  Outcome: Progressing

## 2022-10-19 NOTE — QUICK NOTE
Chart reviewed  Currently on prednisone 40 mg daily (? Last dose of steroids today)  Will adjust insulin regimen to NPH 50 units with breakfast, 35 units at lunch and 35 units with dinner  For follow-up Accu-Cheks and adjust insulin regimen as indicated

## 2022-10-20 ENCOUNTER — ANESTHESIA EVENT (INPATIENT)
Dept: GASTROENTEROLOGY | Facility: HOSPITAL | Age: 77
DRG: 189 | End: 2022-10-20
Payer: COMMERCIAL

## 2022-10-20 ENCOUNTER — APPOINTMENT (OUTPATIENT)
Dept: GASTROENTEROLOGY | Facility: HOSPITAL | Age: 77
DRG: 189 | End: 2022-10-20
Payer: COMMERCIAL

## 2022-10-20 ENCOUNTER — ANESTHESIA (INPATIENT)
Dept: GASTROENTEROLOGY | Facility: HOSPITAL | Age: 77
DRG: 189 | End: 2022-10-20
Payer: COMMERCIAL

## 2022-10-20 PROBLEM — K92.2 GI BLEEDING: Status: ACTIVE | Noted: 2022-10-20

## 2022-10-20 LAB
ABO GROUP BLD: NORMAL
ABO GROUP BLD: NORMAL
ALBUMIN SERPL BCP-MCNC: 2.9 G/DL (ref 3.5–5)
ALP SERPL-CCNC: 47 U/L (ref 46–116)
ALT SERPL W P-5'-P-CCNC: 53 U/L (ref 12–78)
ANION GAP SERPL CALCULATED.3IONS-SCNC: 6 MMOL/L (ref 4–13)
ANION GAP SERPL CALCULATED.3IONS-SCNC: 6 MMOL/L (ref 4–13)
AST SERPL W P-5'-P-CCNC: 47 U/L (ref 5–45)
BASOPHILS # BLD AUTO: 0 THOUSANDS/ÂΜL (ref 0–0.1)
BASOPHILS # BLD AUTO: 0.01 THOUSANDS/ÂΜL (ref 0–0.1)
BASOPHILS NFR BLD AUTO: 0 % (ref 0–1)
BASOPHILS NFR BLD AUTO: 0 % (ref 0–1)
BILIRUB SERPL-MCNC: 0.26 MG/DL (ref 0.2–1)
BLD GP AB SCN SERPL QL: NEGATIVE
BUN SERPL-MCNC: 106 MG/DL (ref 5–25)
BUN SERPL-MCNC: 110 MG/DL (ref 5–25)
CALCIUM ALBUM COR SERPL-MCNC: 8.6 MG/DL (ref 8.3–10.1)
CALCIUM SERPL-MCNC: 7.6 MG/DL (ref 8.3–10.1)
CALCIUM SERPL-MCNC: 7.7 MG/DL (ref 8.3–10.1)
CHLORIDE SERPL-SCNC: 100 MMOL/L (ref 96–108)
CHLORIDE SERPL-SCNC: 104 MMOL/L (ref 96–108)
CO2 SERPL-SCNC: 30 MMOL/L (ref 21–32)
CO2 SERPL-SCNC: 30 MMOL/L (ref 21–32)
CREAT SERPL-MCNC: 2.64 MG/DL (ref 0.6–1.3)
CREAT SERPL-MCNC: 2.74 MG/DL (ref 0.6–1.3)
EOSINOPHIL # BLD AUTO: 0 THOUSAND/ÂΜL (ref 0–0.61)
EOSINOPHIL # BLD AUTO: 0.01 THOUSAND/ÂΜL (ref 0–0.61)
EOSINOPHIL NFR BLD AUTO: 0 % (ref 0–6)
EOSINOPHIL NFR BLD AUTO: 0 % (ref 0–6)
ERYTHROCYTE [DISTWIDTH] IN BLOOD BY AUTOMATED COUNT: 15.5 % (ref 11.6–15.1)
ERYTHROCYTE [DISTWIDTH] IN BLOOD BY AUTOMATED COUNT: 15.8 % (ref 11.6–15.1)
ERYTHROCYTE [DISTWIDTH] IN BLOOD BY AUTOMATED COUNT: 15.9 % (ref 11.6–15.1)
GFR SERPL CREATININE-BSD FRML MDRD: 21 ML/MIN/1.73SQ M
GFR SERPL CREATININE-BSD FRML MDRD: 22 ML/MIN/1.73SQ M
GLUCOSE SERPL-MCNC: 141 MG/DL (ref 65–140)
GLUCOSE SERPL-MCNC: 182 MG/DL (ref 65–140)
GLUCOSE SERPL-MCNC: 195 MG/DL (ref 65–140)
GLUCOSE SERPL-MCNC: 221 MG/DL (ref 65–140)
GLUCOSE SERPL-MCNC: 265 MG/DL (ref 65–140)
GLUCOSE SERPL-MCNC: 329 MG/DL (ref 65–140)
GLUCOSE SERPL-MCNC: 376 MG/DL (ref 65–140)
HCT VFR BLD AUTO: 18.1 % (ref 36.5–49.3)
HCT VFR BLD AUTO: 20.6 % (ref 36.5–49.3)
HCT VFR BLD AUTO: 20.8 % (ref 36.5–49.3)
HCT VFR BLD AUTO: 25.3 % (ref 36.5–49.3)
HGB BLD-MCNC: 5.8 G/DL (ref 12–17)
HGB BLD-MCNC: 6.4 G/DL (ref 12–17)
HGB BLD-MCNC: 6.6 G/DL (ref 12–17)
HGB BLD-MCNC: 8.4 G/DL (ref 12–17)
IMM GRANULOCYTES # BLD AUTO: 0.12 THOUSAND/UL (ref 0–0.2)
IMM GRANULOCYTES # BLD AUTO: 0.18 THOUSAND/UL (ref 0–0.2)
IMM GRANULOCYTES NFR BLD AUTO: 2 % (ref 0–2)
IMM GRANULOCYTES NFR BLD AUTO: 2 % (ref 0–2)
LYMPHOCYTES # BLD AUTO: 0.64 THOUSANDS/ÂΜL (ref 0.6–4.47)
LYMPHOCYTES # BLD AUTO: 0.87 THOUSANDS/ÂΜL (ref 0.6–4.47)
LYMPHOCYTES NFR BLD AUTO: 8 % (ref 14–44)
LYMPHOCYTES NFR BLD AUTO: 9 % (ref 14–44)
MCH RBC QN AUTO: 27.1 PG (ref 26.8–34.3)
MCH RBC QN AUTO: 27.9 PG (ref 26.8–34.3)
MCH RBC QN AUTO: 28.3 PG (ref 26.8–34.3)
MCHC RBC AUTO-ENTMCNC: 31.1 G/DL (ref 31.4–37.4)
MCHC RBC AUTO-ENTMCNC: 31.7 G/DL (ref 31.4–37.4)
MCHC RBC AUTO-ENTMCNC: 32 G/DL (ref 31.4–37.4)
MCV RBC AUTO: 87 FL (ref 82–98)
MCV RBC AUTO: 87 FL (ref 82–98)
MCV RBC AUTO: 89 FL (ref 82–98)
MONOCYTES # BLD AUTO: 0.68 THOUSAND/ÂΜL (ref 0.17–1.22)
MONOCYTES # BLD AUTO: 0.97 THOUSAND/ÂΜL (ref 0.17–1.22)
MONOCYTES NFR BLD AUTO: 10 % (ref 4–12)
MONOCYTES NFR BLD AUTO: 9 % (ref 4–12)
NEUTROPHILS # BLD AUTO: 6.31 THOUSANDS/ÂΜL (ref 1.85–7.62)
NEUTROPHILS # BLD AUTO: 7.82 THOUSANDS/ÂΜL (ref 1.85–7.62)
NEUTS SEG NFR BLD AUTO: 79 % (ref 43–75)
NEUTS SEG NFR BLD AUTO: 81 % (ref 43–75)
NRBC BLD AUTO-RTO: 0 /100 WBCS
NRBC BLD AUTO-RTO: 0 /100 WBCS
PLATELET # BLD AUTO: 116 THOUSANDS/UL (ref 149–390)
PLATELET # BLD AUTO: 117 THOUSANDS/UL (ref 149–390)
PLATELET # BLD AUTO: 148 THOUSANDS/UL (ref 149–390)
PMV BLD AUTO: 10 FL (ref 8.9–12.7)
PMV BLD AUTO: 10.3 FL (ref 8.9–12.7)
PMV BLD AUTO: 10.9 FL (ref 8.9–12.7)
POTASSIUM SERPL-SCNC: 4.3 MMOL/L (ref 3.5–5.3)
POTASSIUM SERPL-SCNC: 5.3 MMOL/L (ref 3.5–5.3)
PROT SERPL-MCNC: 5.8 G/DL (ref 6.4–8.4)
RBC # BLD AUTO: 2.08 MILLION/UL (ref 3.88–5.62)
RBC # BLD AUTO: 2.33 MILLION/UL (ref 3.88–5.62)
RBC # BLD AUTO: 2.36 MILLION/UL (ref 3.88–5.62)
RH BLD: POSITIVE
RH BLD: POSITIVE
SODIUM SERPL-SCNC: 136 MMOL/L (ref 135–147)
SODIUM SERPL-SCNC: 140 MMOL/L (ref 135–147)
SPECIMEN EXPIRATION DATE: NORMAL
WBC # BLD AUTO: 7.75 THOUSAND/UL (ref 4.31–10.16)
WBC # BLD AUTO: 8.95 THOUSAND/UL (ref 4.31–10.16)
WBC # BLD AUTO: 9.86 THOUSAND/UL (ref 4.31–10.16)

## 2022-10-20 PROCEDURE — 3E0G8GC INTRODUCTION OF OTHER THERAPEUTIC SUBSTANCE INTO UPPER GI, VIA NATURAL OR ARTIFICIAL OPENING ENDOSCOPIC: ICD-10-PCS | Performed by: INTERNAL MEDICINE

## 2022-10-20 PROCEDURE — P9016 RBC LEUKOCYTES REDUCED: HCPCS

## 2022-10-20 PROCEDURE — 85014 HEMATOCRIT: CPT | Performed by: PHYSICIAN ASSISTANT

## 2022-10-20 PROCEDURE — 85018 HEMOGLOBIN: CPT | Performed by: PHYSICIAN ASSISTANT

## 2022-10-20 PROCEDURE — 94640 AIRWAY INHALATION TREATMENT: CPT

## 2022-10-20 PROCEDURE — 30233N1 TRANSFUSION OF NONAUTOLOGOUS RED BLOOD CELLS INTO PERIPHERAL VEIN, PERCUTANEOUS APPROACH: ICD-10-PCS | Performed by: FAMILY MEDICINE

## 2022-10-20 PROCEDURE — 99232 SBSQ HOSP IP/OBS MODERATE 35: CPT | Performed by: INTERNAL MEDICINE

## 2022-10-20 PROCEDURE — 99232 SBSQ HOSP IP/OBS MODERATE 35: CPT | Performed by: PHYSICIAN ASSISTANT

## 2022-10-20 PROCEDURE — 94760 N-INVAS EAR/PLS OXIMETRY 1: CPT

## 2022-10-20 PROCEDURE — 85025 COMPLETE CBC W/AUTO DIFF WBC: CPT | Performed by: NURSE PRACTITIONER

## 2022-10-20 PROCEDURE — 80048 BASIC METABOLIC PNL TOTAL CA: CPT | Performed by: INTERNAL MEDICINE

## 2022-10-20 PROCEDURE — 0D598ZZ DESTRUCTION OF DUODENUM, VIA NATURAL OR ARTIFICIAL OPENING ENDOSCOPIC: ICD-10-PCS | Performed by: INTERNAL MEDICINE

## 2022-10-20 PROCEDURE — 82948 REAGENT STRIP/BLOOD GLUCOSE: CPT

## 2022-10-20 PROCEDURE — XW0G886 INTRODUCTION OF MINERAL-BASED TOPICAL HEMOSTATIC AGENT INTO UPPER GI, VIA NATURAL OR ARTIFICIAL OPENING ENDOSCOPIC, NEW TECHNOLOGY GROUP 6: ICD-10-PCS | Performed by: INTERNAL MEDICINE

## 2022-10-20 PROCEDURE — 85025 COMPLETE CBC W/AUTO DIFF WBC: CPT | Performed by: STUDENT IN AN ORGANIZED HEALTH CARE EDUCATION/TRAINING PROGRAM

## 2022-10-20 RX ORDER — SODIUM CHLORIDE 9 MG/ML
50 INJECTION, SOLUTION INTRAVENOUS CONTINUOUS
OUTPATIENT
Start: 2022-10-20

## 2022-10-20 RX ORDER — SODIUM CHLORIDE 9 MG/ML
INJECTION, SOLUTION INTRAVENOUS CONTINUOUS PRN
Status: DISCONTINUED | OUTPATIENT
Start: 2022-10-20 | End: 2022-10-20

## 2022-10-20 RX ORDER — PROPOFOL 10 MG/ML
INJECTION, EMULSION INTRAVENOUS CONTINUOUS PRN
Status: DISCONTINUED | OUTPATIENT
Start: 2022-10-20 | End: 2022-10-20

## 2022-10-20 RX ORDER — PROPOFOL 10 MG/ML
INJECTION, EMULSION INTRAVENOUS AS NEEDED
Status: DISCONTINUED | OUTPATIENT
Start: 2022-10-20 | End: 2022-10-20

## 2022-10-20 RX ORDER — EPINEPHRINE 0.1 MG/ML
SYRINGE (ML) INJECTION CODE/TRAUMA/SEDATION MEDICATION
Status: DISCONTINUED | OUTPATIENT
Start: 2022-10-20 | End: 2022-10-24 | Stop reason: HOSPADM

## 2022-10-20 RX ORDER — FUROSEMIDE 40 MG/1
40 TABLET ORAL DAILY
Status: DISCONTINUED | OUTPATIENT
Start: 2022-10-21 | End: 2022-10-24 | Stop reason: HOSPADM

## 2022-10-20 RX ORDER — LIDOCAINE HYDROCHLORIDE 10 MG/ML
INJECTION, SOLUTION EPIDURAL; INFILTRATION; INTRACAUDAL; PERINEURAL AS NEEDED
Status: DISCONTINUED | OUTPATIENT
Start: 2022-10-20 | End: 2022-10-20

## 2022-10-20 RX ADMIN — PROPOFOL 100 MG: 10 INJECTION, EMULSION INTRAVENOUS at 14:12

## 2022-10-20 RX ADMIN — EPINEPHRINE 0.4 MG: 0.1 INJECTION INTRACARDIAC; INTRAVENOUS at 14:28

## 2022-10-20 RX ADMIN — FLUTICASONE FUROATE AND VILANTEROL TRIFENATATE 1 PUFF: 100; 25 POWDER RESPIRATORY (INHALATION) at 09:08

## 2022-10-20 RX ADMIN — SODIUM CHLORIDE: 9 INJECTION, SOLUTION INTRAVENOUS at 14:12

## 2022-10-20 RX ADMIN — LIDOCAINE HYDROCHLORIDE 50 MG: 10 INJECTION, SOLUTION EPIDURAL; INFILTRATION; INTRACAUDAL; PERINEURAL at 14:12

## 2022-10-20 RX ADMIN — INSULIN LISPRO 3 UNITS: 100 INJECTION, SOLUTION INTRAVENOUS; SUBCUTANEOUS at 17:47

## 2022-10-20 RX ADMIN — INSULIN LISPRO 2 UNITS: 100 INJECTION, SOLUTION INTRAVENOUS; SUBCUTANEOUS at 11:55

## 2022-10-20 RX ADMIN — PRAVASTATIN SODIUM 80 MG: 80 TABLET ORAL at 17:45

## 2022-10-20 RX ADMIN — UMECLIDINIUM 1 PUFF: 62.5 AEROSOL, POWDER ORAL at 09:07

## 2022-10-20 RX ADMIN — TAMSULOSIN HYDROCHLORIDE 0.4 MG: 0.4 CAPSULE ORAL at 17:45

## 2022-10-20 RX ADMIN — LEVALBUTEROL HYDROCHLORIDE 1.25 MG: 1.25 SOLUTION, CONCENTRATE RESPIRATORY (INHALATION) at 20:30

## 2022-10-20 RX ADMIN — PROPOFOL 100 MCG/KG/MIN: 10 INJECTION, EMULSION INTRAVENOUS at 14:24

## 2022-10-20 RX ADMIN — PROPOFOL 50 MG: 10 INJECTION, EMULSION INTRAVENOUS at 14:16

## 2022-10-20 RX ADMIN — GABAPENTIN 100 MG: 100 CAPSULE ORAL at 17:50

## 2022-10-20 RX ADMIN — SALINE NASAL SPRAY 1 SPRAY: 1.5 SOLUTION NASAL at 09:07

## 2022-10-20 RX ADMIN — ISODIUM CHLORIDE 3 ML: 0.03 SOLUTION RESPIRATORY (INHALATION) at 20:30

## 2022-10-20 RX ADMIN — LIDOCAINE 5% 1 PATCH: 700 PATCH TOPICAL at 09:14

## 2022-10-20 RX ADMIN — GUAIFENESIN 600 MG: 600 TABLET, EXTENDED RELEASE ORAL at 17:44

## 2022-10-20 RX ADMIN — INSULIN ASPART 30 UNITS: 100 INJECTION, SUSPENSION SUBCUTANEOUS at 17:50

## 2022-10-20 RX ADMIN — LEVALBUTEROL HYDROCHLORIDE 1.25 MG: 1.25 SOLUTION, CONCENTRATE RESPIRATORY (INHALATION) at 07:07

## 2022-10-20 RX ADMIN — PROPOFOL 50 MG: 10 INJECTION, EMULSION INTRAVENOUS at 14:22

## 2022-10-20 RX ADMIN — INSULIN LISPRO 4 UNITS: 100 INJECTION, SOLUTION INTRAVENOUS; SUBCUTANEOUS at 21:08

## 2022-10-20 RX ADMIN — NICOTINE 7 MG: 7 PATCH, EXTENDED RELEASE TRANSDERMAL at 09:13

## 2022-10-20 RX ADMIN — GABAPENTIN 100 MG: 100 CAPSULE ORAL at 21:07

## 2022-10-20 RX ADMIN — PROPOFOL 20 MG: 10 INJECTION, EMULSION INTRAVENOUS at 14:39

## 2022-10-20 RX ADMIN — CARVEDILOL 6.25 MG: 6.25 TABLET, FILM COATED ORAL at 17:45

## 2022-10-20 RX ADMIN — ISODIUM CHLORIDE 3 ML: 0.03 SOLUTION RESPIRATORY (INHALATION) at 07:07

## 2022-10-20 NOTE — PLAN OF CARE
Problem: Potential for Falls  Goal: Patient will remain free of falls  Description: INTERVENTIONS:  - Educate patient/family on patient safety including physical limitations  - Instruct patient to call for assistance with activity   - Consult OT/PT to assist with strengthening/mobility   - Keep Call bell within reach  - Keep bed low and locked with side rails adjusted as appropriate  - Keep care items and personal belongings within reach  - Initiate and maintain comfort rounds  - Make Fall Risk Sign visible to staff  - Offer Toileting every 2 Hours, in advance of need  - Initiate/Maintain tabs alarm  - Obtain necessary fall risk management equipment  - Apply yellow socks and bracelet for high fall risk patients  - Consider moving patient to room near nurses station  Outcome: Progressing     Problem: PAIN - ADULT  Goal: Verbalizes/displays adequate comfort level or baseline comfort level  Description: Interventions:  - Encourage patient to monitor pain and request assistance  - Assess pain using appropriate pain scale  - Administer analgesics based on type and severity of pain and evaluate response  - Implement non-pharmacological measures as appropriate and evaluate response  - Consider cultural and social influences on pain and pain management  - Notify physician/advanced practitioner if interventions unsuccessful or patient reports new pain  Outcome: Progressing     Problem: INFECTION - ADULT  Goal: Absence or prevention of progression during hospitalization  Description: INTERVENTIONS:  - Assess and monitor for signs and symptoms of infection  - Monitor lab/diagnostic results  - Monitor all insertion sites, i e  indwelling lines, tubes, and drains  - Monitor endotracheal if appropriate and nasal secretions for changes in amount and color  - [place] appropriate cooling/warming therapies per order  - Administer medications as ordered  - Instruct and encourage patient and family to use good hand hygiene technique  - Identify and instruct in appropriate isolation precautions for identified infection/condition  Outcome: Progressing  Goal: Absence of fever/infection during neutropenic period  Description: INTERVENTIONS:  - Monitor WBC    Outcome: Progressing     Problem: SAFETY ADULT  Goal: Patient will remain free of falls  Description: INTERVENTIONS:  - Educate patient/family on patient safety including physical limitations  - Instruct patient to call for assistance with activity   - Consult OT/PT to assist with strengthening/mobility   - Keep Call bell within reach  - Keep bed low and locked with side rails adjusted as appropriate  - Keep care items and personal belongings within reach  - Initiate and maintain comfort rounds  - Make Fall Risk Sign visible to staff  - Offer Toileting every 2 Hours, in advance of need  - Initiate/Maintain tabs alarm  - Obtain necessary fall risk management equipment  - Apply yellow socks and bracelet for high fall risk patients  - Consider moving patient to room near nurses station  Outcome: Progressing  Goal: Maintain or return to baseline ADL function  Description: INTERVENTIONS:  - Educate patient/family on patient safety including physical limitations  - Instruct patient to call for assistance with activity   - Consult OT/PT to assist with strengthening/mobility   - Keep Call bell within reach  - Keep bed low and locked with side rails adjusted as appropriate  - Keep care items and personal belongings within reach  - Initiate and maintain comfort rounds  - Make Fall Risk Sign visible to staff  - Offer Toileting every 2 Hours, in advance of need  - Initiate/Maintain bed alarm  - Obtain necessary fall risk management equipment  - Apply yellow socks and bracelet for high fall risk patients  - Consider moving patient to room near nurses station  Outcome: Progressing  Goal: Maintains/Returns to pre admission functional level  Description: INTERVENTIONS:  - Perform BMAT or MOVE assessment daily    - Set and communicate daily mobility goal to care team and patient/family/caregiver  - Collaborate with rehabilitation services on mobility goals if consulted  - Perform Range of Motion *3** times a day  - Reposition patient every *2** hours  - Dangle patient *3** times a day  - Stand patient *3** times a day  - Ambulate patient **3* times a day  - Out of bed to chair **3* times a day   - Out of bed for meals **3* times a day  - Out of bed for toileting  - Record patient progress and toleration of activity level   Outcome: Progressing     Problem: DISCHARGE PLANNING  Goal: Discharge to home or other facility with appropriate resources  Description: INTERVENTIONS:  - Identify barriers to discharge w/patient and caregiver  - Arrange for needed discharge resources and transportation as appropriate  - Identify discharge learning needs (meds, wound care, etc )  - Arrange for interpretive services to assist at discharge as needed  - Refer to Case Management Department for coordinating discharge planning if the patient needs post-hospital services based on physician/advanced practitioner order or complex needs related to functional status, cognitive ability, or social support system  Outcome: Progressing     Problem: Knowledge Deficit  Goal: Patient/family/caregiver demonstrates understanding of disease process, treatment plan, medications, and discharge instructions  Description: Complete learning assessment and assess knowledge base    Interventions:  - Provide teaching at level of understanding  - Provide teaching via preferred learning methods  Outcome: Progressing     Problem: RESPIRATORY - ADULT  Goal: Achieves optimal ventilation and oxygenation  Description: INTERVENTIONS:  - Assess for changes in respiratory status  - Assess for changes in mentation and behavior  - Position to facilitate oxygenation and minimize respiratory effort  - Oxygen administered by appropriate delivery if ordered  - Initiate smoking cessation education as indicated  - Encourage broncho-pulmonary hygiene including cough, deep breathe, Incentive Spirometry  - Assess the need for suctioning and aspirate as needed  - Assess and instruct to report SOB or any respiratory difficulty  - Respiratory Therapy support as indicated  Outcome: Progressing     Problem: Prexisting or High Potential for Compromised Skin Integrity  Goal: Skin integrity is maintained or improved  Description: INTERVENTIONS:  - Identify patients at risk for skin breakdown  - Assess and monitor skin integrity  - Assess and monitor nutrition and hydration status  - Monitor labs   - Assess for incontinence   - Turn and reposition patient  - Assist with mobility/ambulation  - Relieve pressure over bony prominences  - Avoid friction and shearing  - Provide appropriate hygiene as needed including keeping skin clean and dry  - Evaluate need for skin moisturizer/barrier cream  - Collaborate with interdisciplinary team   - Patient/family teaching  - Consider wound care consult   Outcome: Progressing     Problem: MOBILITY - ADULT  Goal: Maintain or return to baseline ADL function  Description: INTERVENTIONS:  - Educate patient/family on patient safety including physical limitations  - Instruct patient to call for assistance with activity   - Consult OT/PT to assist with strengthening/mobility   - Keep Call bell within reach  - Keep bed low and locked with side rails adjusted as appropriate  - Keep care items and personal belongings within reach  - Initiate and maintain comfort rounds  - Make Fall Risk Sign visible to staff  - Offer Toileting every 2 Hours, in advance of need  - Initiate/Maintain bed alarm  - Obtain necessary fall risk management equipment  - Apply yellow socks and bracelet for high fall risk patients  - Consider moving patient to room near nurses station  Outcome: Progressing  Goal: Maintains/Returns to pre admission functional level  Description: INTERVENTIONS:  - Perform BMAT or MOVE assessment daily    - Set and communicate daily mobility goal to care team and patient/family/caregiver  - Collaborate with rehabilitation services on mobility goals if consulted  - Perform Range of Motion 3 times a day  - Reposition patient every **2* hours    - Dangle patient **3* times a day  - Stand patient *3** times a day  - Ambulate patient *3** times a day  - Out of bed to chair *3** times a day   - Out of bed for meals *3** times a day  - Out of bed for toileting  - Record patient progress and toleration of activity level   Outcome: Progressing

## 2022-10-20 NOTE — ANESTHESIA POSTPROCEDURE EVALUATION
Post-Op Assessment Note    CV Status:  Stable    Pain management: adequate     Mental Status:  Alert and awake   Hydration Status:  Euvolemic   PONV Controlled:  Controlled   Airway Patency:  Patent      Post Op Vitals Reviewed: Yes      Staff: CRNA         No complications documented      /74 (10/20/22 1512)    Temp 97 9 °F (36 6 °C) (10/20/22 1512)    Pulse 104 (10/20/22 1512)   Resp 18 (10/20/22 1512)    SpO2 99 % (10/20/22 1512)

## 2022-10-20 NOTE — ED ATTENDING ATTESTATION
10/16/2022   IOrlin MD, saw and evaluated the patient  I have discussed the patient with the resident/non-physician practitioner and agree with the resident's/non-physician practitioner's findings, Plan of Care, and MDM as documented in the resident's/non-physician practitioner's note, except where noted  All available labs and Radiology studies were reviewed  I was present for key portions of any procedure(s) performed by the resident/non-physician practitioner and I was immediately available to provide assistance  At this point I agree with the current assessment done in the Emergency Department  I have conducted an independent evaluation of this patient a history and physical is as follows:    Chief Complaint   Patient presents with   • Shortness of Breath     Pt recently D/C from hospital  Today started with increased dyspnea on exertion  Home SPO2 88%              Physical Exam  /73   Pulse (!) 106   Temp 97 8 °F (36 6 °C) (Tympanic)   Resp 20   Ht 6' (1 829 m)   Wt 97 5 kg (215 lb)   SpO2 93%   BMI 29 16 kg/m²      Vital signs and nursing notes reviewed    ** IF YOU ARE READING THIS, THE EXAM TEMPLATE BELOW HAS NOT BEEN UPDATED**    CONSTITUTIONAL: male appearing stated age resting in bed, in no acute distress  HEENT: atraumatic, normocephalic  Sclera anicteric, conjunctiva are not injected  Moist oral mucosa  CARDIOVASCULAR/CHEST: RRR, no M/R/G  2+ radial pulses  PULMONARY: Breathing comfortably on RA  Breath sounds are equal and clear to auscultation  ABDOMEN: non-distended  BS present, normoactive  Non-tender  MSK: moves all extremities, no deformities, no peripheral edema, no calf asymmetry  NEURO: Awake, alert, and oriented x 3  Face symmetric  Moves all extremities spontaneously   No focal neurologic deficits  SKIN: Warm, appears well-perfused  MENTAL STATUS: Normal affect      Labs and Imaging  Labs Reviewed   CBC AND DIFFERENTIAL - Abnormal       Result Value Ref Range Status    WBC 14 13 (*) 4 31 - 10 16 Thousand/uL Final    RBC 3 78 (*) 3 88 - 5 62 Million/uL Final    Hemoglobin 10 2 (*) 12 0 - 17 0 g/dL Final    Hematocrit 32 3 (*) 36 5 - 49 3 % Final    MCV 85  82 - 98 fL Final    MCH 27 0  26 8 - 34 3 pg Final    MCHC 31 6  31 4 - 37 4 g/dL Final    RDW 15 2 (*) 11 6 - 15 1 % Final    MPV 11 3  8 9 - 12 7 fL Final    Platelets 226  331 - 390 Thousands/uL Final    Narrative: This is an appended report  These results have been appended to a previously verified report  BLOOD GAS, VENOUS - Abnormal    pH, Dwain 7 236 (*) 7 300 - 7 400 Final    pCO2, Dwain 69 5 (*) 42 0 - 50 0 mm Hg Final    pO2, Dwain 52 2 (*) 35 0 - 45 0 mm Hg Final    HCO3, Dwain 28 8  24 - 30 mmol/L Final    Base Excess, Dwain 0 1  mmol/L Final    O2 Content, Dwain 12 7  ml/dL Final    O2 HGB, VENOUS 78 7  60 0 - 80 0 % Final   COMPREHENSIVE METABOLIC PANEL - Abnormal    Sodium 135  135 - 147 mmol/L Final    Potassium 5 9 (*) 3 5 - 5 3 mmol/L Final    Comment: Slightly Hemolyzed; Results May be Affected    Chloride 104  96 - 108 mmol/L Final    CO2 28  21 - 32 mmol/L Final    ANION GAP 3 (*) 4 - 13 mmol/L Final    BUN 90 (*) 5 - 25 mg/dL Final    Creatinine 2 93 (*) 0 60 - 1 30 mg/dL Final    Comment: Standardized to IDMS reference method    Glucose 344 (*) 65 - 140 mg/dL Final    Comment: If the patient is fasting, the ADA then defines impaired fasting glucose as > 100 mg/dL and diabetes as > or equal to 123 mg/dL  Specimen collection should occur prior to Sulfasalazine administration due to the potential for falsely depressed results  Specimen collection should occur prior to Sulfapyridine administration due to the potential for falsely elevated results  Calcium 8 2 (*) 8 3 - 10 1 mg/dL Final    Corrected Calcium 9 0  8 3 - 10 1 mg/dL Final    AST 25  5 - 45 U/L Final    Comment: Slightly Hemolyzed;  Results May be Affected  Specimen collection should occur prior to Sulfasalazine administration due to the potential for falsely depressed results  ALT 39  12 - 78 U/L Final    Comment: Specimen collection should occur prior to Sulfasalazine and/or Sulfapyridine administration due to the potential for falsely depressed results  Alkaline Phosphatase 67  46 - 116 U/L Final    Total Protein 7 5  6 4 - 8 4 g/dL Final    Albumin 3 0 (*) 3 5 - 5 0 g/dL Final    Total Bilirubin 0 37  0 20 - 1 00 mg/dL Final    Comment: Use of this assay is not recommended for patients undergoing treatment with eltrombopag due to the potential for falsely elevated results      eGFR 19  ml/min/1 73sq m Final    Narrative:     National Kidney Disease Foundation guidelines for Chronic Kidney Disease (CKD):   •  Stage 1 with normal or high GFR (GFR > 90 mL/min/1 73 square meters)  •  Stage 2 Mild CKD (GFR = 60-89 mL/min/1 73 square meters)  •  Stage 3A Moderate CKD (GFR = 45-59 mL/min/1 73 square meters)  •  Stage 3B Moderate CKD (GFR = 30-44 mL/min/1 73 square meters)  •  Stage 4 Severe CKD (GFR = 15-29 mL/min/1 73 square meters)  •  Stage 5 End Stage CKD (GFR <15 mL/min/1 73 square meters)  Note: GFR calculation is accurate only with a steady state creatinine   MANUAL DIFFERENTIAL(PHLEBS DO NOT ORDER) - Abnormal    Segmented % 71  43 - 75 % Final    Bands % 1  0 - 8 % Final    Lymphocytes % 17  14 - 44 % Final    Monocytes % 6  4 - 12 % Final    Eosinophils, % 5  0 - 6 % Final    Basophils % 0  0 - 1 % Final    Absolute Neutrophils 10 17 (*) 1 85 - 7 62 Thousand/uL Final    Lymphocytes Absolute 2 40  0 60 - 4 47 Thousand/uL Final    Monocytes Absolute 0 85  0 00 - 1 22 Thousand/uL Final    Eosinophils Absolute 0 71 (*) 0 00 - 0 40 Thousand/uL Final    Basophils Absolute 0 00  0 00 - 0 10 Thousand/uL Final    Total Counted     Final    RBC Morphology Present   Final    Ovalocytes Present   Final    Poikilocytes Present   Final    Platelet Estimate Adequate  Adequate Final   NOVEL CORONAVIRUS (COVID-19), PCR [facility] - Normal    SARS-CoV-2 Negative  Negative Final    Comment:      Narrative:     FOR PEDIATRIC PATIENTS - copy/paste COVID Guidelines URL to browser: [email or web address]    SARS-CoV-2 assay is a Nucleic Acid Amplification assay intended for the  qualitative detection of nucleic acid from SARS-CoV-2 in nasopharyngeal  swabs  Results are for the presumptive identification of SARS-CoV-2 RNA  Positive results are indicative of infection with SARS-CoV-2, the virus  causing COVID-19, but do not rule out bacterial infection or co-infection  with other viruses  Laboratories within the [place] and its  territories are required to report all positive results to the appropriate  public health authorities  Negative results do not preclude SARS-CoV-2  infection and should not be used as the sole basis for treatment or other  patient management decisions  Negative results must be combined with  clinical observations, patient history, and epidemiological information  This test has not been FDA cleared or approved  This test has been authorized by FDA under an Emergency Use Authorization  (EUA)  This test is only authorized for the duration of time the  declaration that circumstances exist justifying the authorization of the  emergency use of an in vitro diagnostic tests for detection of SARS-CoV-2  virus and/or diagnosis of COVID-19 infection under section 564(b)(1) of  the Act, 21 U  S C  501HYO-1(Q)(6), unless the authorization is terminated  or revoked sooner  The test has been validated but independent review by FDA  and CLIA is pending  Test performed using [employer] Gene[employer]pert: This RT-PCR assay targets N2,  a region unique to SARS-CoV-2  A conserved region in the E-gene was chosen  for pan-Sarbecovirus detection which includes SARS-CoV-2  According to CMS-2020-01-R, this platform meets the definition of high-throughput technology     HS TROPONIN I 0HR - Normal    hs TnI 0hr 40 "Refer to ACS Flowchart"- see link ng/L Final    Comment:                                              Initial (time 0) result  If >=50 ng/L, Myocardial injury suggested ;  Type of myocardial injury and treatment strategy  to be determined  If 5-49 ng/L, a delta result at 2 hours and or 4 hours will be needed to further evaluate  If <4 ng/L, and chest pain has been >3 hours since onset, patient may qualify for discharge based on the HEART score in the ED  If <5 ng/L and <3hours since onset of chest pain, a delta result at 2 hours will be needed to further evaluate  HS Troponin 99th Percentile URL of a Health Population=12 ng/L with a 95% Confidence Interval of 8-18 ng/L  Second Troponin (time 2 hours)  If calculated delta >= 20 ng/L,  Myocardial injury suggested ; Type of myocardial injury and treatment strategy to be determined  If 5-49 ng/L and the calculated delta is 5-19 ng/L, consult medical service for evaluation  Continue evaluation for ischemia on ecg and other possible etiology and repeat hs troponin at 4 hours  If delta is <5 ng/L at 2 hours, consider discharge based on risk stratification via the HEART score (if in ED), or MIKKI risk score in IP/Observation  HS Troponin 99th Percentile URL of a Health Population=12 ng/L with a 95% Confidence Interval of 8-18 ng/L    HS TROPONIN I 2HR - Normal    hs TnI 2hr 38  "Refer to ACS Flowchart"- see link ng/L Final    Comment:                                              Initial (time 0) result  If >=50 ng/L, Myocardial injury suggested ;  Type of myocardial injury and treatment strategy  to be determined  If 5-49 ng/L, a delta result at 2 hours and or 4 hours will be needed to further evaluate  If <4 ng/L, and chest pain has been >3 hours since onset, patient may qualify for discharge based on the HEART score in the ED  If <5 ng/L and <3hours since onset of chest pain, a delta result at 2 hours will be needed to further evaluate      HS Troponin 99th Percentile URL of a Health Population=12 ng/L with a 95% Confidence Interval of 8-18 ng/L  Second Troponin (time 2 hours)  If calculated delta >= 20 ng/L,  Myocardial injury suggested ; Type of myocardial injury and treatment strategy to be determined  If 5-49 ng/L and the calculated delta is 5-19 ng/L, consult medical service for evaluation  Continue evaluation for ischemia on ecg and other possible etiology and repeat hs troponin at 4 hours  If delta is <5 ng/L at 2 hours, consider discharge based on risk stratification via the HEART score (if in ED), or MIKKI risk score in IP/Observation  HS Troponin 99th Percentile URL of a Health Population=12 ng/L with a 95% Confidence Interval of 8-18 ng/L      Delta 2hr hsTnI -2  <20 ng/L Final       XR chest 1 view portable   Final Result      Stable appearance of volume loss and chronic opacities in the right                  Workstation performed: [id number]               Procedures  Procedures        ED Course  Medications   aspirin (ECOTRIN LOW STRENGTH) EC tablet 81 mg (81 mg Oral Not Given 10/20/22 0837)   cyclobenzaprine (FLEXERIL) tablet 10 mg (10 mg Oral Given 10/19/22 0915)   ferrous sulfate tablet 325 mg (325 mg Oral Not Given 10/20/22 0738)   fluticasone-vilanterol (BREO ELLIPTA) 100-25 mcg/inh inhaler 1 puff (1 puff Inhalation Given 10/20/22 0908)   gabapentin (NEURONTIN) capsule 100 mg (100 mg Oral Not Given 10/20/22 0837)   insulin aspart protamine-insulin aspart (NovoLOG 70/30) 100 units/mL subcutaneous injection 50 Units (50 Units Subcutaneous Not Given 10/20/22 0739)   insulin aspart protamine-insulin aspart (NovoLOG 70/30) 100 units/mL subcutaneous injection 30 Units (30 Units Subcutaneous Given 10/19/22 7845)   lidocaine (LIDODERM) 5 % patch 1 patch (1 patch Topical Medication Applied 10/20/22 0914)   pantoprazole (PROTONIX) EC tablet 40 mg (0 mg Oral Hold 10/20/22 0600)   pravastatin (PRAVACHOL) tablet 80 mg (80 mg Oral Given 10/19/22 9564) tamsulosin (FLOMAX) capsule 0 4 mg (0 4 mg Oral Given 10/19/22 1744)   umeclidinium bromide (INCRUSE ELLIPTA) 62 5 mcg/inh inhaler AEPB 1 puff (1 puff Inhalation Given 10/20/22 0907)   acetaminophen (TYLENOL) tablet 650 mg (has no administration in time range)   guaiFENesin (MUCINEX) 12 hr tablet 600 mg (600 mg Oral Not Given 10/20/22 0837)   levalbuterol (XOPENEX) inhalation solution 1 25 mg (1 25 mg Nebulization Not Given 10/20/22 1500)     And   sodium chloride 0 9 % inhalation solution 3 mL (3 mL Nebulization Not Given 10/20/22 1501)   insulin lispro (HumaLOG) 100 units/mL subcutaneous injection 1-6 Units (2 Units Subcutaneous Given 10/20/22 1155)   insulin lispro (HumaLOG) 100 units/mL subcutaneous injection 1-5 Units (2 Units Subcutaneous Given 10/19/22 2130)   nicotine (NICODERM CQ) 7 mg/24hr TD 24 hr patch 7 mg (7 mg Transdermal Medication Applied 10/20/22 0913)   albuterol inhalation solution 2 5 mg (has no administration in time range)   carvedilol (COREG) tablet 6 25 mg (6 25 mg Oral Not Given 10/20/22 0738)   hydrALAZINE (APRESOLINE) injection 5 mg (has no administration in time range)   oxymetazoline (AFRIN) 0 05 % nasal spray 2 spray (2 sprays Each Nare Refused 10/19/22 2130)   sodium chloride (OCEAN) 0 65 % nasal spray 1 spray (1 spray Each Nare Not Given 10/20/22 1157)   mupirocin (BACTROBAN) 2 % ointment ( Topical Refused 10/20/22 0908)   isosorbide mononitrate (IMDUR) 24 hr tablet 30 mg (30 mg Oral Not Given 10/20/22 0838)   insulin aspart protamine-insulin aspart (NovoLOG 70/30) 100 units/mL subcutaneous injection 35 Units (35 Units Subcutaneous Not Given 10/20/22 1154)   furosemide (LASIX) tablet 40 mg (has no administration in time range)   EPINEPHrine (ADRENALIN) injection (0 4 mg Intravenous Given 10/20/22 1428)   albuterol (FOR EMS ONLY) (2 5 mg/3 mL) 0 083 % inhalation solution 2 5 mg (0 mg Does not apply Given to EMS 10/16/22 2042)   ipratropium-albuterol (FOR EMS ONLY) (DUO-NEB) 0 5-2 5 mg/3 mL inhalation solution 3 mL (0 mL Does not apply Given to EMS 10/16/22 2042)   albuterol inhalation solution 10 mg (10 mg Nebulization Given 10/16/22 2055)     And   ipratropium (ATROVENT) 0 02 % inhalation solution 1 mg (1 mg Nebulization Given 10/16/22 2055)     And   sodium chloride 0 9 % inhalation solution 3 mL (3 mL Nebulization Given 10/16/22 2055)   calcium gluconate 1 g in sodium chloride 0 9% 50 mL (premix) (1 g Intravenous New Bag 10/17/22 1718)   furosemide (LASIX) injection 40 mg (40 mg Intravenous Given 10/17/22 2329)   insulin regular (HumuLIN R,NovoLIN R) injection 10 Units (10 Units Intravenous Given 10/17/22 2318)   albuterol inhalation solution 10 mg (10 mg Nebulization Given 10/17/22 2250)   calcium gluconate 1 g in sodium chloride 0 9% 50 mL (premix) (0 g Intravenous Stopped 10/18/22 0102)   sodium chloride 0 9 % bolus 250 mL (0 mL Intravenous Stopped 10/18/22 0002)   insulin regular (HumuLIN R,NovoLIN R) injection 10 Units (10 Units Intravenous Given 10/18/22 0327)   predniSONE tablet 40 mg (40 mg Oral Given 10/19/22 0912)   sodium polystyrene (KAYEXALATE) powder 15 g (15 g Oral Given 10/18/22 1024)   polyethylene glycol (GOLYTELY) bowel prep 4,000 mL (4,000 mL Oral Given 10/19/22 1742)

## 2022-10-20 NOTE — QUICK NOTE
TT from nursing in regard to pt feeling dizzy and visibly shaky in his hands  Vitals stable bp 141/61, pulse 86, oxygen 99% and blood sugar 235 prior to receiving 2 units of insulin   Upon my arrival to the floor pt is sitting in the chair he feels lightheaded and dizzy  He has a gallon of golylty in front of him for which there is about a 1/4 of a container left  Pt has had multiple bms he reports darker in color than when he started the prep her reports as normal bm   I have asked rn to draw labs as I suspect may be electrolytes asked her to give pt some broth and apple juice before midnight     Labs currently in process

## 2022-10-20 NOTE — ASSESSMENT & PLAN NOTE
Lab Results   Component Value Date    EGFR 22 10/20/2022    EGFR 21 10/19/2022    EGFR 21 10/19/2022    CREATININE 2 64 (H) 10/20/2022    CREATININE 2 74 (H) 10/19/2022    CREATININE 2 72 (H) 10/19/2022     Evaluated by nephrology on recent admission, recent baseline has been in the low 2s and was discharged with creatinine of 2 6  · Creatinine is currently stable in mid-2s at this time   · Continue home dose diuretics   · Avoid nephrotoxins and hypotension   · Monitor BMP

## 2022-10-20 NOTE — QUICK NOTE
All lab resulted at 2335 last evening however I was just tt emergently from lab with results of cbc at 410am this morning  I have sees Mr Tim Scott still feeling shakey and dizzy a little better than earlier   We discussed his need for blood transfusion discussed risks and benefits   He is agreeable and signed consent will transfuse pt with one unit prbc over 4 hours due to his ckd

## 2022-10-20 NOTE — ED ATTENDING ATTESTATION
10/16/2022   I, Hue Guerra MD, saw and evaluated the patient  I have discussed the patient with the resident/non-physician practitioner and agree with the resident's/non-physician practitioner's findings, Plan of Care, and MDM as documented in the resident's/non-physician practitioner's note, except where noted  All available labs and Radiology studies were reviewed  I was present for key portions of any procedure(s) performed by the resident/non-physician practitioner and I was immediately available to provide assistance  At this point I agree with the current assessment done in the Emergency Department  I have conducted an independent evaluation of this patient a history and physical is as follows:    Chief Complaint   Patient presents with   • Shortness of Breath     Pt recently D/C from hospital  Today started with increased dyspnea on exertion  Home SPO2 88%              Physical Exam  /73   Pulse (!) 106   Temp 97 8 °F (36 6 °C) (Tympanic)   Resp 20   Ht 6' (1 829 m)   Wt 97 5 kg (215 lb)   SpO2 93%   BMI 29 16 kg/m²      Vital signs and nursing notes reviewed    ** IF YOU ARE READING THIS, THE EXAM TEMPLATE BELOW HAS NOT BEEN UPDATED**    CONSTITUTIONAL: male appearing stated age resting in bed, in no acute distress  HEENT: atraumatic, normocephalic  Sclera anicteric, conjunctiva are not injected  Moist oral mucosa  CARDIOVASCULAR/CHEST: RRR, no M/R/G  2+ radial pulses  PULMONARY: Breathing comfortably on RA  Breath sounds are equal and clear to auscultation  ABDOMEN: non-distended  BS present, normoactive  Non-tender  MSK: moves all extremities, no deformities, no peripheral edema, no calf asymmetry  NEURO: Awake, alert, and oriented x 3  Face symmetric  Moves all extremities spontaneously   No focal neurologic deficits  SKIN: Warm, appears well-perfused  MENTAL STATUS: Normal affect      Labs and Imaging  Labs Reviewed   CBC AND DIFFERENTIAL - Abnormal       Result Value Ref Range Status    WBC 14 13 (*) 4 31 - 10 16 Thousand/uL Final    RBC 3 78 (*) 3 88 - 5 62 Million/uL Final    Hemoglobin 10 2 (*) 12 0 - 17 0 g/dL Final    Hematocrit 32 3 (*) 36 5 - 49 3 % Final    MCV 85  82 - 98 fL Final    MCH 27 0  26 8 - 34 3 pg Final    MCHC 31 6  31 4 - 37 4 g/dL Final    RDW 15 2 (*) 11 6 - 15 1 % Final    MPV 11 3  8 9 - 12 7 fL Final    Platelets 745  417 - 390 Thousands/uL Final    Narrative: This is an appended report  These results have been appended to a previously verified report  BLOOD GAS, VENOUS - Abnormal    pH, Dwain 7 236 (*) 7 300 - 7 400 Final    pCO2, Dwain 69 5 (*) 42 0 - 50 0 mm Hg Final    pO2, Dwain 52 2 (*) 35 0 - 45 0 mm Hg Final    HCO3, Dwain 28 8  24 - 30 mmol/L Final    Base Excess, Dwain 0 1  mmol/L Final    O2 Content, Dwain 12 7  ml/dL Final    O2 HGB, VENOUS 78 7  60 0 - 80 0 % Final   COMPREHENSIVE METABOLIC PANEL - Abnormal    Sodium 135  135 - 147 mmol/L Final    Potassium 5 9 (*) 3 5 - 5 3 mmol/L Final    Comment: Slightly Hemolyzed; Results May be Affected    Chloride 104  96 - 108 mmol/L Final    CO2 28  21 - 32 mmol/L Final    ANION GAP 3 (*) 4 - 13 mmol/L Final    BUN 90 (*) 5 - 25 mg/dL Final    Creatinine 2 93 (*) 0 60 - 1 30 mg/dL Final    Comment: Standardized to IDMS reference method    Glucose 344 (*) 65 - 140 mg/dL Final    Comment: If the patient is fasting, the ADA then defines impaired fasting glucose as > 100 mg/dL and diabetes as > or equal to 123 mg/dL  Specimen collection should occur prior to Sulfasalazine administration due to the potential for falsely depressed results  Specimen collection should occur prior to Sulfapyridine administration due to the potential for falsely elevated results  Calcium 8 2 (*) 8 3 - 10 1 mg/dL Final    Corrected Calcium 9 0  8 3 - 10 1 mg/dL Final    AST 25  5 - 45 U/L Final    Comment: Slightly Hemolyzed;  Results May be Affected  Specimen collection should occur prior to Sulfasalazine administration due to the potential for falsely depressed results  ALT 39  12 - 78 U/L Final    Comment: Specimen collection should occur prior to Sulfasalazine and/or Sulfapyridine administration due to the potential for falsely depressed results  Alkaline Phosphatase 67  46 - 116 U/L Final    Total Protein 7 5  6 4 - 8 4 g/dL Final    Albumin 3 0 (*) 3 5 - 5 0 g/dL Final    Total Bilirubin 0 37  0 20 - 1 00 mg/dL Final    Comment: Use of this assay is not recommended for patients undergoing treatment with eltrombopag due to the potential for falsely elevated results      eGFR 19  ml/min/1 73sq m Final    Narrative:     National Kidney Disease Foundation guidelines for Chronic Kidney Disease (CKD):   •  Stage 1 with normal or high GFR (GFR > 90 mL/min/1 73 square meters)  •  Stage 2 Mild CKD (GFR = 60-89 mL/min/1 73 square meters)  •  Stage 3A Moderate CKD (GFR = 45-59 mL/min/1 73 square meters)  •  Stage 3B Moderate CKD (GFR = 30-44 mL/min/1 73 square meters)  •  Stage 4 Severe CKD (GFR = 15-29 mL/min/1 73 square meters)  •  Stage 5 End Stage CKD (GFR <15 mL/min/1 73 square meters)  Note: GFR calculation is accurate only with a steady state creatinine   MANUAL DIFFERENTIAL(PHLEBS DO NOT ORDER) - Abnormal    Segmented % 71  43 - 75 % Final    Bands % 1  0 - 8 % Final    Lymphocytes % 17  14 - 44 % Final    Monocytes % 6  4 - 12 % Final    Eosinophils, % 5  0 - 6 % Final    Basophils % 0  0 - 1 % Final    Absolute Neutrophils 10 17 (*) 1 85 - 7 62 Thousand/uL Final    Lymphocytes Absolute 2 40  0 60 - 4 47 Thousand/uL Final    Monocytes Absolute 0 85  0 00 - 1 22 Thousand/uL Final    Eosinophils Absolute 0 71 (*) 0 00 - 0 40 Thousand/uL Final    Basophils Absolute 0 00  0 00 - 0 10 Thousand/uL Final    Total Counted     Final    RBC Morphology Present   Final    Ovalocytes Present   Final    Poikilocytes Present   Final    Platelet Estimate Adequate  Adequate Final   NOVEL CORONAVIRUS (COVID-19), PCR [facility] - Normal    SARS-CoV-2 Negative  Negative Final    Comment:      Narrative:     FOR PEDIATRIC PATIENTS - copy/paste COVID Guidelines URL to browser: [email or web address]    SARS-CoV-2 assay is a Nucleic Acid Amplification assay intended for the  qualitative detection of nucleic acid from SARS-CoV-2 in nasopharyngeal  swabs  Results are for the presumptive identification of SARS-CoV-2 RNA  Positive results are indicative of infection with SARS-CoV-2, the virus  causing COVID-19, but do not rule out bacterial infection or co-infection  with other viruses  Laboratories within the [place] and its  territories are required to report all positive results to the appropriate  public health authorities  Negative results do not preclude SARS-CoV-2  infection and should not be used as the sole basis for treatment or other  patient management decisions  Negative results must be combined with  clinical observations, patient history, and epidemiological information  This test has not been FDA cleared or approved  This test has been authorized by FDA under an Emergency Use Authorization  (EUA)  This test is only authorized for the duration of time the  declaration that circumstances exist justifying the authorization of the  emergency use of an in vitro diagnostic tests for detection of SARS-CoV-2  virus and/or diagnosis of COVID-19 infection under section 564(b)(1) of  the Act, 21 U  S C  285HKJ-3(X)(5), unless the authorization is terminated  or revoked sooner  The test has been validated but independent review by FDA  and CLIA is pending  Test performed using [employer] GeneXpert: This RT-PCR assay targets N2,  a region unique to SARS-CoV-2  A conserved region in the E-gene was chosen  for pan-Sarbecovirus detection which includes SARS-CoV-2  According to CMS-2020-01-R, this platform meets the definition of high-throughput technology     HS TROPONIN I 0HR - Normal    hs TnI 0hr 40 "Refer to ACS Flowchart"- see link ng/L Final    Comment:                                              Initial (time 0) result  If >=50 ng/L, Myocardial injury suggested ;  Type of myocardial injury and treatment strategy  to be determined  If 5-49 ng/L, a delta result at 2 hours and or 4 hours will be needed to further evaluate  If <4 ng/L, and chest pain has been >3 hours since onset, patient may qualify for discharge based on the HEART score in the ED  If <5 ng/L and <3hours since onset of chest pain, a delta result at 2 hours will be needed to further evaluate  HS Troponin 99th Percentile URL of a Health Population=12 ng/L with a 95% Confidence Interval of 8-18 ng/L  Second Troponin (time 2 hours)  If calculated delta >= 20 ng/L,  Myocardial injury suggested ; Type of myocardial injury and treatment strategy to be determined  If 5-49 ng/L and the calculated delta is 5-19 ng/L, consult medical service for evaluation  Continue evaluation for ischemia on ecg and other possible etiology and repeat hs troponin at 4 hours  If delta is <5 ng/L at 2 hours, consider discharge based on risk stratification via the HEART score (if in ED), or MIKKI risk score in IP/Observation  HS Troponin 99th Percentile URL of a Health Population=12 ng/L with a 95% Confidence Interval of 8-18 ng/L    HS TROPONIN I 2HR - Normal    hs TnI 2hr 38  "Refer to ACS Flowchart"- see link ng/L Final    Comment:                                              Initial (time 0) result  If >=50 ng/L, Myocardial injury suggested ;  Type of myocardial injury and treatment strategy  to be determined  If 5-49 ng/L, a delta result at 2 hours and or 4 hours will be needed to further evaluate  If <4 ng/L, and chest pain has been >3 hours since onset, patient may qualify for discharge based on the HEART score in the ED  If <5 ng/L and <3hours since onset of chest pain, a delta result at 2 hours will be needed to further evaluate      HS Troponin 99th Percentile URL of a Health Population=12 ng/L with a 95% Confidence Interval of 8-18 ng/L  Second Troponin (time 2 hours)  If calculated delta >= 20 ng/L,  Myocardial injury suggested ; Type of myocardial injury and treatment strategy to be determined  If 5-49 ng/L and the calculated delta is 5-19 ng/L, consult medical service for evaluation  Continue evaluation for ischemia on ecg and other possible etiology and repeat hs troponin at 4 hours  If delta is <5 ng/L at 2 hours, consider discharge based on risk stratification via the HEART score (if in ED), or MIKKI risk score in IP/Observation  HS Troponin 99th Percentile URL of a Health Population=12 ng/L with a 95% Confidence Interval of 8-18 ng/L      Delta 2hr hsTnI -2  <20 ng/L Final       XR chest 1 view portable   Final Result      Stable appearance of volume loss and chronic opacities in the right                  Workstation performed: [id number]               Procedures  Procedures        ED Course  Medications   aspirin (ECOTRIN LOW STRENGTH) EC tablet 81 mg (81 mg Oral Not Given 10/20/22 0837)   cyclobenzaprine (FLEXERIL) tablet 10 mg (10 mg Oral Given 10/19/22 0915)   ferrous sulfate tablet 325 mg (325 mg Oral Not Given 10/20/22 0738)   fluticasone-vilanterol (BREO ELLIPTA) 100-25 mcg/inh inhaler 1 puff (1 puff Inhalation Given 10/20/22 0908)   gabapentin (NEURONTIN) capsule 100 mg (100 mg Oral Not Given 10/20/22 0837)   insulin aspart protamine-insulin aspart (NovoLOG 70/30) 100 units/mL subcutaneous injection 50 Units (50 Units Subcutaneous Not Given 10/20/22 0739)   insulin aspart protamine-insulin aspart (NovoLOG 70/30) 100 units/mL subcutaneous injection 30 Units (30 Units Subcutaneous Given 10/19/22 8801)   lidocaine (LIDODERM) 5 % patch 1 patch (1 patch Topical Medication Applied 10/20/22 0914)   pantoprazole (PROTONIX) EC tablet 40 mg (0 mg Oral Hold 10/20/22 0600)   pravastatin (PRAVACHOL) tablet 80 mg (80 mg Oral Given 10/19/22 4104) tamsulosin (FLOMAX) capsule 0 4 mg (0 4 mg Oral Given 10/19/22 1744)   umeclidinium bromide (INCRUSE ELLIPTA) 62 5 mcg/inh inhaler AEPB 1 puff (1 puff Inhalation Given 10/20/22 0907)   acetaminophen (TYLENOL) tablet 650 mg (has no administration in time range)   guaiFENesin (MUCINEX) 12 hr tablet 600 mg (600 mg Oral Not Given 10/20/22 0837)   levalbuterol (XOPENEX) inhalation solution 1 25 mg (1 25 mg Nebulization Not Given 10/20/22 1500)     And   sodium chloride 0 9 % inhalation solution 3 mL (3 mL Nebulization Not Given 10/20/22 1501)   insulin lispro (HumaLOG) 100 units/mL subcutaneous injection 1-6 Units (2 Units Subcutaneous Given 10/20/22 1155)   insulin lispro (HumaLOG) 100 units/mL subcutaneous injection 1-5 Units (2 Units Subcutaneous Given 10/19/22 2130)   nicotine (NICODERM CQ) 7 mg/24hr TD 24 hr patch 7 mg (7 mg Transdermal Medication Applied 10/20/22 0913)   albuterol inhalation solution 2 5 mg (has no administration in time range)   carvedilol (COREG) tablet 6 25 mg (6 25 mg Oral Not Given 10/20/22 0738)   hydrALAZINE (APRESOLINE) injection 5 mg (has no administration in time range)   oxymetazoline (AFRIN) 0 05 % nasal spray 2 spray (2 sprays Each Nare Refused 10/19/22 2130)   sodium chloride (OCEAN) 0 65 % nasal spray 1 spray (1 spray Each Nare Not Given 10/20/22 1157)   mupirocin (BACTROBAN) 2 % ointment ( Topical Refused 10/20/22 0908)   isosorbide mononitrate (IMDUR) 24 hr tablet 30 mg (30 mg Oral Not Given 10/20/22 0838)   insulin aspart protamine-insulin aspart (NovoLOG 70/30) 100 units/mL subcutaneous injection 35 Units (35 Units Subcutaneous Not Given 10/20/22 1154)   furosemide (LASIX) tablet 40 mg (has no administration in time range)   EPINEPHrine (ADRENALIN) injection (0 4 mg Intravenous Given 10/20/22 1428)   albuterol (FOR EMS ONLY) (2 5 mg/3 mL) 0 083 % inhalation solution 2 5 mg (0 mg Does not apply Given to EMS 10/16/22 2042)   ipratropium-albuterol (FOR EMS ONLY) (DUO-NEB) 0 5-2 5 mg/3 mL inhalation solution 3 mL (0 mL Does not apply Given to EMS 10/16/22 2042)   albuterol inhalation solution 10 mg (10 mg Nebulization Given 10/16/22 2055)     And   ipratropium (ATROVENT) 0 02 % inhalation solution 1 mg (1 mg Nebulization Given 10/16/22 2055)     And   sodium chloride 0 9 % inhalation solution 3 mL (3 mL Nebulization Given 10/16/22 2055)   calcium gluconate 1 g in sodium chloride 0 9% 50 mL (premix) (1 g Intravenous New Bag 10/17/22 1718)   furosemide (LASIX) injection 40 mg (40 mg Intravenous Given 10/17/22 2329)   insulin regular (HumuLIN R,NovoLIN R) injection 10 Units (10 Units Intravenous Given 10/17/22 2318)   albuterol inhalation solution 10 mg (10 mg Nebulization Given 10/17/22 2250)   calcium gluconate 1 g in sodium chloride 0 9% 50 mL (premix) (0 g Intravenous Stopped 10/18/22 0102)   sodium chloride 0 9 % bolus 250 mL (0 mL Intravenous Stopped 10/18/22 0002)   insulin regular (HumuLIN R,NovoLIN R) injection 10 Units (10 Units Intravenous Given 10/18/22 0327)   predniSONE tablet 40 mg (40 mg Oral Given 10/19/22 0912)   sodium polystyrene (KAYEXALATE) powder 15 g (15 g Oral Given 10/18/22 1024)   polyethylene glycol (GOLYTELY) bowel prep 4,000 mL (4,000 mL Oral Given 10/19/22 1742)

## 2022-10-20 NOTE — ASSESSMENT & PLAN NOTE
· Noted, appears baseline Hgb has been around 12  Hgb has been slowly trending down  Most recent Hgb this AM 5 8, s/p 1 unit PRBC, repeat H/H pending  · Recently started on Eliquis this month with subsequent drop in Hgb -- currently on hold   · Iron panel, B12, folate unremarkable  · Pt reports intermittent episodes of epistaxis which appears mild -- ENT evaluated patient, degree of anemia would not be explained with mild nosebleed, and has since resolved with Afrin/holding Eliquis   · They recommend Bactroban BID x 2 weeks, then 2-3x/week after that   · Pt denied any melena/hematochezia  BUN is elevated  GI evaluation appreciated, plan for EGD/colonoscopy on 10/20     · Monitor serial H/H and transfuse prn

## 2022-10-20 NOTE — PLAN OF CARE
Problem: Potential for Falls  Goal: Patient will remain free of falls  Description: INTERVENTIONS:  - Educate patient/family on patient safety including physical limitations  - Instruct patient to call for assistance with activity   - Consult OT/PT to assist with strengthening/mobility   - Keep Call bell within reach  - Keep bed low and locked with side rails adjusted as appropriate  - Keep care items and personal belongings within reach  - Initiate and maintain comfort rounds  - Make Fall Risk Sign visible to staff  - Offer Toileting every 2 Hours, in advance of need  - Initiate/Maintain tabs alarm  - Obtain necessary fall risk management equipment  - Apply yellow socks and bracelet for high fall risk patients  - Consider moving patient to room near nurses station  Outcome: Progressing     Problem: PAIN - ADULT  Goal: Verbalizes/displays adequate comfort level or baseline comfort level  Description: Interventions:  - Encourage patient to monitor pain and request assistance  - Assess pain using appropriate pain scale  - Administer analgesics based on type and severity of pain and evaluate response  - Implement non-pharmacological measures as appropriate and evaluate response  - Consider cultural and social influences on pain and pain management  - Notify physician/advanced practitioner if interventions unsuccessful or patient reports new pain  Outcome: Progressing     Problem: INFECTION - ADULT  Goal: Absence or prevention of progression during hospitalization  Description: INTERVENTIONS:  - Assess and monitor for signs and symptoms of infection  - Monitor lab/diagnostic results  - Monitor all insertion sites, i e  indwelling lines, tubes, and drains  - Monitor endotracheal if appropriate and nasal secretions for changes in amount and color  - Barrett appropriate cooling/warming therapies per order  - Administer medications as ordered  - Instruct and encourage patient and family to use good hand hygiene technique  - Identify and instruct in appropriate isolation precautions for identified infection/condition  Outcome: Progressing  Goal: Absence of fever/infection during neutropenic period  Description: INTERVENTIONS:  - Monitor WBC    Outcome: Progressing     Problem: SAFETY ADULT  Goal: Patient will remain free of falls  Description: INTERVENTIONS:  - Educate patient/family on patient safety including physical limitations  - Instruct patient to call for assistance with activity   - Consult OT/PT to assist with strengthening/mobility   - Keep Call bell within reach  - Keep bed low and locked with side rails adjusted as appropriate  - Keep care items and personal belongings within reach  - Initiate and maintain comfort rounds  - Make Fall Risk Sign visible to staff  - Offer Toileting every 2 Hours, in advance of need  - Initiate/Maintain tabs alarm  - Obtain necessary fall risk management equipment  - Apply yellow socks and bracelet for high fall risk patients  - Consider moving patient to room near nurses station  Outcome: Progressing  Goal: Maintain or return to baseline ADL function  Description: INTERVENTIONS:  - Educate patient/family on patient safety including physical limitations  - Instruct patient to call for assistance with activity   - Consult OT/PT to assist with strengthening/mobility   - Keep Call bell within reach  - Keep bed low and locked with side rails adjusted as appropriate  - Keep care items and personal belongings within reach  - Initiate and maintain comfort rounds  - Make Fall Risk Sign visible to staff  - Offer Toileting every 2 Hours, in advance of need  - Initiate/Maintain bed alarm  - Obtain necessary fall risk management equipment  - Apply yellow socks and bracelet for high fall risk patients  - Consider moving patient to room near nurses station  Outcome: Progressing  Goal: Maintains/Returns to pre admission functional level  Description: INTERVENTIONS:  - Perform BMAT or MOVE assessment daily    - Set and communicate daily mobility goal to care team and patient/family/caregiver  - Collaborate with rehabilitation services on mobility goals if consulted  - Perform Range of Motion 3 times a day  - Reposition patient every 2 hours  - Dangle patient 3 times a day  - Stand patient 3 times a day  - Ambulate patient 3 times a day  - Out of bed to chair 3 times a day   - Out of bed for meals 3 times a day  - Out of bed for toileting  - Record patient progress and toleration of activity level   Outcome: Progressing     Problem: DISCHARGE PLANNING  Goal: Discharge to home or other facility with appropriate resources  Description: INTERVENTIONS:  - Identify barriers to discharge w/patient and caregiver  - Arrange for needed discharge resources and transportation as appropriate  - Identify discharge learning needs (meds, wound care, etc )  - Arrange for interpretive services to assist at discharge as needed  - Refer to Case Management Department for coordinating discharge planning if the patient needs post-hospital services based on physician/advanced practitioner order or complex needs related to functional status, cognitive ability, or social support system  Outcome: Progressing     Problem: Knowledge Deficit  Goal: Patient/family/caregiver demonstrates understanding of disease process, treatment plan, medications, and discharge instructions  Description: Complete learning assessment and assess knowledge base    Interventions:  - Provide teaching at level of understanding  - Provide teaching via preferred learning methods  Outcome: Progressing     Problem: RESPIRATORY - ADULT  Goal: Achieves optimal ventilation and oxygenation  Description: INTERVENTIONS:  - Assess for changes in respiratory status  - Assess for changes in mentation and behavior  - Position to facilitate oxygenation and minimize respiratory effort  - Oxygen administered by appropriate delivery if ordered  - Initiate smoking cessation education as indicated  - Encourage broncho-pulmonary hygiene including cough, deep breathe, Incentive Spirometry  - Assess the need for suctioning and aspirate as needed  - Assess and instruct to report SOB or any respiratory difficulty  - Respiratory Therapy support as indicated  Outcome: Progressing     Problem: Prexisting or High Potential for Compromised Skin Integrity  Goal: Skin integrity is maintained or improved  Description: INTERVENTIONS:  - Identify patients at risk for skin breakdown  - Assess and monitor skin integrity  - Assess and monitor nutrition and hydration status  - Monitor labs   - Assess for incontinence   - Turn and reposition patient  - Assist with mobility/ambulation  - Relieve pressure over bony prominences  - Avoid friction and shearing  - Provide appropriate hygiene as needed including keeping skin clean and dry  - Evaluate need for skin moisturizer/barrier cream  - Collaborate with interdisciplinary team   - Patient/family teaching  - Consider wound care consult   Outcome: Progressing

## 2022-10-20 NOTE — ASSESSMENT & PLAN NOTE
Presenting with complains of SOB  At baseline uses 2 L NC as needed however was acutely hypoxic on admission requirring 3-4 L NC   · VBG on admission: pH 7 23, CO2 69, O2 52, HCO3 28  · Currently, on room air with SpO2 low 90s at rest   · Monitor and titrate supplemental oxygen as needed   · Pulmonology input appreciated, do not suspect recurrent COPD exacerbation    Completed short course of PO Prednisone on 10/19  · Respiratory protocol

## 2022-10-20 NOTE — ASSESSMENT & PLAN NOTE
Lab Results   Component Value Date    HGBA1C 9 7 (H) 10/10/2022       Recent Labs     10/19/22  1709 10/19/22  2048 10/19/22  2206 10/20/22  0735   POCGLU 184* 235* 313* 141*       Blood Sugar Average: Last 72 hrs:  (P) 057 [id number]   · Patient reporting labile blood sugars since discharge, as high as in the 300s and as low as 20  · Recently discharged on new dosing regimen of Humalog 75/25 - 50 units, 40 units, 30 units with breakfast lunch and dinner respectively  · Appreciate endocrinology consult and recommendations -- continue insulin as per their recs   · Carb controlled diet   · Hypoglycemia protocol

## 2022-10-20 NOTE — ASSESSMENT & PLAN NOTE
Recent admission for COPD exacerbation, discharge on 10/15 with 5 days prednisone  Re-presents due to acute onset shortness of breath  On admission found to by hypoxic with respiratory acidosis  · CXR on admission: Stable appearance of volume loss and chronic opacities in the right   · Continued on prednisone 40 mg daily on admission -- continue through 10/19   · Continue with Breo/Incruse 100/25/62 5 daily  · Guaifenesin for expectoration 600mg twice daily  · Xopenex round-the-clock and p r n  Alberto Edwards   · Pulm recs appreciated, do not suspect exacerbation but do recommend he complete prednisone through 10/19

## 2022-10-20 NOTE — ASSESSMENT & PLAN NOTE
Likely related to hyperglycemia and CKD   · Continue low potassium diet  · Cleatis Shield was discontinued   · Resolved with hyperkalemia medications   · Nephrology input appreciated -- will need outpt f/u no

## 2022-10-20 NOTE — PROGRESS NOTES
[address] [facility]  Progress Note - Claudio Outhouse Sr  1945, 78 y o  male MRN: [id number]  Unit/Bed#: [facility] 627-01 Encounter: [id number]  Primary Care Provider: Jett Goodman MD   Date and time admitted to hospital: 10/16/2022  8:22 PM    * Acute on chronic respiratory failure with hypoxia and hypercapnia [facility])  Assessment & Plan  Presenting with complains of SOB  At baseline uses 2 L NC as needed however was acutely hypoxic on admission requirring 3-4 L NC   · VBG on admission: pH 7 23, CO2 69, O2 52, HCO3 28  · Currently, on room air with SpO2 low 90s at rest   · Monitor and titrate supplemental oxygen as needed   · Pulmonology input appreciated, do not suspect recurrent COPD exacerbation  Completed short course of PO Prednisone on 10/19  · Respiratory protocol     Anemia  Assessment & Plan  · Noted, appears baseline Hgb has been around 12  Hgb has been slowly trending down  Most recent Hgb this AM 5 8, s/p 1 unit PRBC, repeat H/H pending  · Recently started on Eliquis this month with subsequent drop in Hgb -- currently on hold   · Iron panel, B12, folate unremarkable  · Pt reports intermittent episodes of epistaxis which appears mild -- ENT evaluated patient, degree of anemia would not be explained with mild nosebleed, and has since resolved with Afrin/holding Eliquis   · They recommend Bactroban BID x 2 weeks, then 2-3x/week after that   · Pt denied any melena/hematochezia  BUN is elevated  GI evaluation appreciated, plan for EGD/colonoscopy on 10/20     · Monitor serial H/H and transfuse prn     Hyperkalemia  Assessment & Plan  Likely related to hyperglycemia and CKD   · Continue low potassium diet  · Santiago Loulou was discontinued   · Resolved with hyperkalemia medications   · Nephrology input appreciated -- will need outpt f/u      A-fib [facility])  Assessment & Plan  · On AC with Eliquis  -- hold for now given acute anemia/epistaxis   · Continue home dose Coreg     Stage 3b chronic kidney disease (CKD) [facility])  Assessment & Plan  Lab Results   Component Value Date    EGFR 22 10/20/2022    EGFR 21 10/19/2022    EGFR 21 10/19/2022    CREATININE 2 64 (H) 10/20/2022    CREATININE 2 74 (H) 10/19/2022    CREATININE 2 72 (H) 10/19/2022     Evaluated by nephrology on recent admission, recent baseline has been in the low 2s and was discharged with creatinine of 2 6  · Creatinine is currently stable in mid-2s at this time   · Continue home dose diuretics   · Avoid nephrotoxins and hypotension   · Monitor BMP     CAD (coronary artery disease)  Assessment & Plan  Status post prior stenting, presenting with SOB as above but denies chest pain (though reported to other providers he did have chest pain)   · NM stress test in May 2022 unremarkable   · Troponin trend negative and EKG without ischemic changes   · Continue aspirin, statin, BB   · Cardiology input appreciated, do not recommend any additional ischemic work up at this time, added Imdur     Chronic diastolic (congestive) heart failure ([address] )  Assessment & Plan  Wt Readings from Last 3 Encounters:   10/20/22 97 5 kg (215 lb)   10/15/22 98 7 kg (217 lb 11 1 oz)   09/17/22 101 kg (222 lb)   · Currently examines euvolemic   · Most recent echo with LVEF 60%  · Continue home dose Coreg and Lasix  · Monitor volume status     Essential hypertension  Assessment & Plan  · Hypertensive urgency on admission, now improved   · Continue home dose Coreg and Lasix  · Monitor routinely     Type 2 diabetes mellitus with hyperglycemia, with long-term current use of insulin [facility])  Assessment & Plan  Lab Results   Component Value Date    HGBA1C 9 7 (H) 10/10/2022       Recent Labs     10/19/22  1709 10/19/22  2048 10/19/22  2206 10/20/22  0735   POCGLU 184* 235* 313* 141*       Blood Sugar Average: Last 72 hrs:  (P) 275 [id number]   · Patient reporting labile blood sugars since discharge, as high as in the 300s and as low as 20  · Recently discharged on new dosing regimen of Humalog 75/25 - 50 units, 40 units, 30 units with breakfast lunch and dinner respectively  · Appreciate endocrinology consult and recommendations -- continue insulin as per their recs   · Carb controlled diet   · Hypoglycemia protocol     HLD (hyperlipidemia)  Assessment & Plan  · On pravastatin 80 mg daily    Possible COPD exacerbation ([address] )  Assessment & Plan  Recent admission for COPD exacerbation, discharge on 10/15 with 5 days prednisone  Re-presents due to acute onset shortness of breath  On admission found to by hypoxic with respiratory acidosis  · CXR on admission: Stable appearance of volume loss and chronic opacities in the right   · Continued on prednisone 40 mg daily on admission -- continue through 10/19   · Continue with Breo/Incruse 100/25/62 5 daily  · Guaifenesin for expectoration 600mg twice daily  · Xopenex round-the-clock and p r n  Elease Breanna · Pulm recs appreciated, do not suspect exacerbation but do recommend he complete prednisone through 10/19         VTE Pharmacologic Prophylaxis: VTE Score: 7 High Risk (Score >/= 5) - Pharmacological DVT Prophylaxis Contraindicated  Sequential Compression Devices Ordered  Patient Centered Rounds: I performed bedside rounds with nursing staff today  Discussions with Specialists or Other Care Team Provider: RN    Education and Discussions with Family / Patient: Patient declined call to   Time Spent for Care: 20 minutes  More than 50% of total time spent on counseling and coordination of care as described above  Current Length of Stay: 4 day(s)  Current Patient Status: Inpatient   Certification Statement: The patient will continue to require additional inpatient hospital stay due to acute anemia requiring transfusion, monitoring h/h, EGD/colonoscopy today   Discharge Plan: Anticipate discharge in 48 hrs to home      Code Status: Level 1 - Full Code    Subjective:   Patient reports he is intermittently dizzy but denies lightheadedness/dizziness, sob, cp presently  Would like something to eat/drink  Objective:     Vitals:   Temp (24hrs), Av 9 °F (36 6 °C), Min:97 6 °F (36 4 °C), Max:98 4 °F (36 9 °C)    Temp:  [97 6 °F (36 4 °C)-98 4 °F (36 9 °C)] 98 4 °F (36 9 °C)  HR:  [82-91] 83  Resp:  [16-20] 18  BP: (107-143)/(51-61) 143/58  SpO2:  [94 %-100 %] 100 %  Body mass index is 29 16 kg/m²  Input and Output Summary (last 24 hours): Intake/Output Summary (Last 24 hours) at 10/20/2022 1026  Last data filed at 10/20/2022 0915  Gross per 24 hour   Intake 4350 ml   Output 1550 ml   Net 2800 ml       Physical Exam:   Physical Exam  Vitals reviewed  Constitutional:       General: He is not in acute distress  Appearance: He is not toxic-appearing  HENT:      Head: Normocephalic and atraumatic  Eyes:      Extraocular Movements: Extraocular movements intact  Cardiovascular:      Rate and Rhythm: Normal rate and regular rhythm  Pulmonary:      Effort: Pulmonary effort is normal  No respiratory distress  Comments: Decreased BS b/l  Abdominal:      General: Bowel sounds are normal  There is no distension  Palpations: Abdomen is soft  Tenderness: There is no abdominal tenderness  Musculoskeletal:         General: Normal range of motion  Cervical back: Normal range of motion  Neurological:      General: No focal deficit present  Mental Status: He is alert and oriented to person, place, and time  Psychiatric:         Mood and Affect: Mood normal          Behavior: Behavior normal          Thought Content:  Thought content normal          Judgment: Judgment normal           Additional Data:     Labs:  Results from last 7 days   Lab Units 10/20/22  0431 10/17/22  1003 10/16/22  2032   WBC Thousand/uL 7 75   < > 14 13*   HEMOGLOBIN g/dL 5 8*   < > 10 2*   HEMATOCRIT % 18 1*   < > 32 3*   PLATELETS Thousands/uL 116*   < > 183   BANDS PCT %  --   --  1   NEUTROS PCT % 81*   < >  --    LYMPHS PCT % 8*   < >  --    LYMPHO PCT %  --   --  17   MONOS PCT % 9   < >  --    MONO PCT %  --   --  6   EOS PCT % 0   < > 5    < > = values in this interval not displayed  Results from last 7 days   Lab Units 10/20/22  0431 10/19/22  2335   SODIUM mmol/L 140 136   POTASSIUM mmol/L 4 3 5 3   CHLORIDE mmol/L 104 100   CO2 mmol/L 30 30   BUN mg/dL 110* 106*   CREATININE mg/dL 2 64* 2 74*   ANION GAP mmol/L 6 6   CALCIUM mg/dL 7 6* 7 7*   ALBUMIN g/dL  --  2 9*   TOTAL BILIRUBIN mg/dL  --  0 26   ALK PHOS U/L  --  47   ALT U/L  --  53   AST U/L  --  47*   GLUCOSE RANDOM mg/dL 182* 329*         Results from last 7 days   Lab Units 10/20/22  0735 10/19/22  2206 10/19/22  2048 10/19/22  1709 10/19/22  1150 10/19/22  0820 10/18/22  2205 10/18/22  2030 10/18/22  1711 10/18/22  1108 10/18/22  0807 10/17/22  2317   POC GLUCOSE mg/dl 141* 313* 235* 184* 203* 238* 174* 188* 140 394* 227* 183*               Lines/Drains:  Invasive Devices  Report    Peripheral Intravenous Line  Duration           Peripheral IV 10/18/22 Left;Proximal;Ventral (anterior) Forearm 1 day                      Imaging: No pertinent imaging reviewed      Recent Cultures (last 7 days):         Last 24 Hours Medication List:   Current Facility-Administered Medications   Medication Dose Route Frequency Provider Last Rate   • acetaminophen  650 mg Oral Q6H PRN Eros Garcia MD     • albuterol  2 5 mg Nebulization Q4H PRN Kita Oates MD     • aspirin  81 mg Oral Daily Eros Garcia MD     • carvedilol  6 25 mg Oral BID With Meals Nina Carreon PA-C     • cyclobenzaprine  10 mg Oral BID PRN Eros Garcia MD     • ferrous sulfate  325 mg Oral Daily With Breakfast Eors Garcia MD     • fluticasone-vilanterol  1 puff Inhalation Daily Eros Garcia MD     • furosemide  40 mg Oral Daily Eros Garcia MD     • gabapentin  100 mg Oral TID Eros Garcia MD     • guaiFENesin  600 mg Oral BID Sebas Bassett Keira Lewis MD     • hydrALAZINE  5 mg Intravenous Q6H PRN Charlotte Rock PA-C     • insulin aspart protamine-insulin aspart  30 Units Subcutaneous Before Radha Mckeon MD     • insulin aspart protamine-insulin aspart  35 Units Subcutaneous Daily Before Lunch [place] Mira Maddox MD     • insulin aspart protamine-insulin aspart  50 Units Subcutaneous Daily With Breakfast Yue Webster MD     • insulin lispro  1-5 Units Subcutaneous HS Yue Webster MD     • insulin lispro  1-6 Units Subcutaneous TID AC Yue Webster MD     • isosorbide mononitrate  30 mg Oral Daily Staci Mccartney, DO     • levalbuterol  1 25 mg Nebulization TID Yue Webster MD      And   • sodium chloride  3 mL Nebulization TID Yue Webster MD     • lidocaine  1 patch Topical Daily Yue Webster MD     • mupirocin   Topical BID Ivan Reinoso MD     • nicotine  7 mg Transdermal Daily Yue Webster MD     • pantoprazole  40 mg Oral Early Morning Yue Webster MD     • pravastatin  80 mg Oral Daily With Radha Mckeon MD     • sodium chloride  1 spray Each Nare 4x Daily Ivan Reinoso MD     • tamsulosin  0 4 mg Oral Daily With Radha Mckeon MD     • umeclidinium bromide  1 puff Inhalation Daily Yue Webster MD          Today, Patient Was Seen By: Shan Gaxiola    **Please Note: This note may have been constructed using a voice recognition system  **

## 2022-10-20 NOTE — ASSESSMENT & PLAN NOTE
Wt Readings from Last 3 Encounters:   10/20/22 97 5 kg (215 lb)   10/15/22 98 7 kg (217 lb 11 1 oz)   09/17/22 101 kg (222 lb)   · Currently examines euvolemic   · Most recent echo with LVEF 60%  · Continue home dose Coreg and Lasix  · Monitor volume status

## 2022-10-20 NOTE — PLAN OF CARE
Problem: Potential for Falls  Goal: Patient will remain free of falls  Description: INTERVENTIONS:  - Educate patient/family on patient safety including physical limitations  - Instruct patient to call for assistance with activity   - Consult OT/PT to assist with strengthening/mobility   - Keep Call bell within reach  - Keep bed low and locked with side rails adjusted as appropriate  - Keep care items and personal belongings within reach  - Initiate and maintain comfort rounds  - Make Fall Risk Sign visible to staff  - Offer Toileting every 2 Hours, in advance of need  - Initiate/Maintain tabs alarm  - Obtain necessary fall risk management equipment  - Apply yellow socks and bracelet for high fall risk patients  - Consider moving patient to room near nurses station  Outcome: Progressing     Problem: PAIN - ADULT  Goal: Verbalizes/displays adequate comfort level or baseline comfort level  Description: Interventions:  - Encourage patient to monitor pain and request assistance  - Assess pain using appropriate pain scale  - Administer analgesics based on type and severity of pain and evaluate response  - Implement non-pharmacological measures as appropriate and evaluate response  - Consider cultural and social influences on pain and pain management  - Notify physician/advanced practitioner if interventions unsuccessful or patient reports new pain  Outcome: Progressing     Problem: INFECTION - ADULT  Goal: Absence or prevention of progression during hospitalization  Description: INTERVENTIONS:  - Assess and monitor for signs and symptoms of infection  - Monitor lab/diagnostic results  - Monitor all insertion sites, i e  indwelling lines, tubes, and drains  - Monitor endotracheal if appropriate and nasal secretions for changes in amount and color  - [place] appropriate cooling/warming therapies per order  - Administer medications as ordered  - Instruct and encourage patient and family to use good hand hygiene technique  - Identify and instruct in appropriate isolation precautions for identified infection/condition  Outcome: Progressing  Goal: Absence of fever/infection during neutropenic period  Description: INTERVENTIONS:  - Monitor WBC    Outcome: Progressing     Problem: SAFETY ADULT  Goal: Patient will remain free of falls  Description: INTERVENTIONS:  - Educate patient/family on patient safety including physical limitations  - Instruct patient to call for assistance with activity   - Consult OT/PT to assist with strengthening/mobility   - Keep Call bell within reach  - Keep bed low and locked with side rails adjusted as appropriate  - Keep care items and personal belongings within reach  - Initiate and maintain comfort rounds  - Make Fall Risk Sign visible to staff  - Offer Toileting every 2 Hours, in advance of need  - Initiate/Maintain tabs alarm  - Obtain necessary fall risk management equipment  - Apply yellow socks and bracelet for high fall risk patients  - Consider moving patient to room near nurses station  Outcome: Progressing  Goal: Maintain or return to baseline ADL function  Description: INTERVENTIONS:  - Educate patient/family on patient safety including physical limitations  - Instruct patient to call for assistance with activity   - Consult OT/PT to assist with strengthening/mobility   - Keep Call bell within reach  - Keep bed low and locked with side rails adjusted as appropriate  - Keep care items and personal belongings within reach  - Initiate and maintain comfort rounds  - Make Fall Risk Sign visible to staff  - Offer Toileting every 2 Hours, in advance of need  - Initiate/Maintain bed alarm  - Obtain necessary fall risk management equipment  - Apply yellow socks and bracelet for high fall risk patients  - Consider moving patient to room near nurses station  Outcome: Progressing  Goal: Maintains/Returns to pre admission functional level  Description: INTERVENTIONS:  - Perform BMAT or MOVE assessment daily    - Set and communicate daily mobility goal to care team and patient/family/caregiver  - Collaborate with rehabilitation services on mobility goals if consulted  - Out of bed for toileting  - Record patient progress and toleration of activity level   Outcome: Progressing     Problem: DISCHARGE PLANNING  Goal: Discharge to home or other facility with appropriate resources  Description: INTERVENTIONS:  - Identify barriers to discharge w/patient and caregiver  - Arrange for needed discharge resources and transportation as appropriate  - Identify discharge learning needs (meds, wound care, etc )  - Arrange for interpretive services to assist at discharge as needed  - Refer to Case Management Department for coordinating discharge planning if the patient needs post-hospital services based on physician/advanced practitioner order or complex needs related to functional status, cognitive ability, or social support system  Outcome: Progressing     Problem: Knowledge Deficit  Goal: Patient/family/caregiver demonstrates understanding of disease process, treatment plan, medications, and discharge instructions  Description: Complete learning assessment and assess knowledge base    Interventions:  - Provide teaching at level of understanding  - Provide teaching via preferred learning methods  Outcome: Progressing     Problem: RESPIRATORY - ADULT  Goal: Achieves optimal ventilation and oxygenation  Description: INTERVENTIONS:  - Assess for changes in respiratory status  - Assess for changes in mentation and behavior  - Position to facilitate oxygenation and minimize respiratory effort  - Oxygen administered by appropriate delivery if ordered  - Initiate smoking cessation education as indicated  - Encourage broncho-pulmonary hygiene including cough, deep breathe, Incentive Spirometry  - Assess the need for suctioning and aspirate as needed  - Assess and instruct to report SOB or any respiratory difficulty  - Respiratory Therapy support as indicated  Outcome: Progressing     Problem: Prexisting or High Potential for Compromised Skin Integrity  Goal: Skin integrity is maintained or improved  Description: INTERVENTIONS:  - Identify patients at risk for skin breakdown  - Assess and monitor skin integrity  - Assess and monitor nutrition and hydration status  - Monitor labs   - Assess for incontinence   - Turn and reposition patient  - Assist with mobility/ambulation  - Relieve pressure over bony prominences  - Avoid friction and shearing  - Provide appropriate hygiene as needed including keeping skin clean and dry  - Evaluate need for skin moisturizer/barrier cream  - Collaborate with interdisciplinary team   - Patient/family teaching  - Consider wound care consult   Outcome: Progressing     Problem: MOBILITY - ADULT  Goal: Maintain or return to baseline ADL function  Description: INTERVENTIONS:  - Educate patient/family on patient safety including physical limitations  - Instruct patient to call for assistance with activity   - Consult OT/PT to assist with strengthening/mobility   - Keep Call bell within reach  - Keep bed low and locked with side rails adjusted as appropriate  - Keep care items and personal belongings within reach  - Initiate and maintain comfort rounds  - Make Fall Risk Sign visible to staff  - Offer Toileting every 2 Hours, in advance of need  - Initiate/Maintain bed alarm  - Obtain necessary fall risk management equipment  - Apply yellow socks and bracelet for high fall risk patients  - Consider moving patient to room near nurses station  Outcome: Progressing  Goal: Maintains/Returns to pre admission functional level  Description: INTERVENTIONS:  - Perform BMAT or MOVE assessment daily    - Set and communicate daily mobility goal to care team and patient/family/caregiver     - Collaborate with rehabilitation services on mobility goals if consulted  - Out of bed for toileting  - Record patient progress and toleration of activity level   Outcome: Progressing

## 2022-10-20 NOTE — PROGRESS NOTES
NEPHROLOGY PROGRESS NOTE   Claudio Clifton Sr  78 y o  male MRN: [id number]  Unit/Bed#: [facility] 627-01 Encounter: [id number]      ASSESSMENT/PLAN:  1  MARANDA, POA: possibly related to GI bleed and some progression of CKD   At risk of worsening MARANDA given hgb drop to 5 8 today   · Creatinine plateaued 3 6-2 0 for the past week and is stable at 2 6 today   · Bladder scan nonsignficant  · Recent UA without proteinuria or hematuria   · Hold ARB   · Lasix also held this am as he is NPO for GI labs today   2  CKD IIIB-IV: baseline creatinine 1 4-1 7 due to HTN and DM   3  Azotemia: BUN worsening to 110 likely due to GI bleed + steroids   4  Anemia of CKD + GI bleed: hgb dropped to 5 8 today   · +FOBT  · S/p transfusion   · Going to GI lab today   · Iron sat 23%   5  Diastolic CHF: currently on lasix 40 mg po daily (held this am)   · Does have some edema but is comfortable on room air   6  COPD with recent exacerbation: tapering off steroids   7  Afib: eliquis on hold     Plan Summary:   • Check am BMP   • Lasix on hold today     SUBJECTIVE:  Feeling ok with no chest pain or shortness of breath       OBJECTIVE:  Current Weight: Weight - Scale: 97 5 kg (215 lb)  Vitals:    10/20/22 0900   BP:    Pulse:    Resp: 18   Temp:    SpO2:        Intake/Output Summary (Last 24 hours) at 10/20/2022 2506  Last data filed at 10/20/2022 0915  Gross per 24 hour   Intake 4350 ml   Output 1550 ml   Net 2800 ml       General:  appears comfortable and in no acute distress   Skin:  No rash, warm, good skin turgor   Eyes:  Sclerae anicteric, no periorbital edema   ENT:  Moist mucous membranes  Neck:  Trachea midline, symmetric   Chest:  Clear to auscultation bilaterally with no wheezes, rales or rhonchi  CVS:  Regular rate and rhythm  Abdomen:  Soft, nontender, nondistended  Neuro:  Awake and alert  Psych:  Appropriate affect  Extremities: +1 lower extremity edema     Medications:  Scheduled Meds:  Current Facility-Administered Medications Medication Dose Route Frequency Provider Last Rate   • acetaminophen  650 mg Oral Q6H PRN Jannette Ahmadi MD     • albuterol  2 5 mg Nebulization Q4H PRN Salomón Dubon MD     • aspirin  81 mg Oral Daily Jannette Ahmadi MD     • carvedilol  6 25 mg Oral BID With Meals Leora Henry PA-C     • cyclobenzaprine  10 mg Oral BID PRN Jannette Ahmadi MD     • ferrous sulfate  325 mg Oral Daily With Breakfast Jannette Ahmadi MD     • fluticasone-vilanterol  1 puff Inhalation Daily Jannette Ahmadi MD     • furosemide  40 mg Oral Daily Jannette Ahmadi MD     • gabapentin  100 mg Oral TID Jannette Ahmadi MD     • guaiFENesin  600 mg Oral BID Jannette Ahmadi MD     • hydrALAZINE  5 mg Intravenous Q6H PRN Leora Henry PA-C     • insulin aspart protamine-insulin aspart  30 Units Subcutaneous Before Dennis Hernandez MD     • insulin aspart protamine-insulin aspart  35 Units Subcutaneous Daily Before Lunch Marianmimarc Ignacio MD     • insulin aspart protamine-insulin aspart  50 Units Subcutaneous Daily With Breakfast Jannette Ahmadi MD     • insulin lispro  1-5 Units Subcutaneous HS Jannette Ahmadi MD     • insulin lispro  1-6 Units Subcutaneous TID AC Jannette Ahmadi MD     • isosorbide mononitrate  30 mg Oral Daily Zeynep Xavier DO     • levalbuterol  1 25 mg Nebulization TID Jannette Ahmadi MD      And   • sodium chloride  3 mL Nebulization TID Jannette Ahmadi MD     • lidocaine  1 patch Topical Daily Jannette Ahmadi MD     • mupirocin   Topical BID Laurel Cordon MD     • nicotine  7 mg Transdermal Daily Jannette Ahmadi MD     • pantoprazole  40 mg Oral Early Morning Jannette Ahmadi MD     • pravastatin  80 mg Oral Daily With Dennis Hernandez MD     • sodium chloride  1 spray Each Nare 4x Daily Laurel Cordon MD     • tamsulosin  0 4 mg Oral Daily With Dennis Hernandez MD • umeclidinium bromide  1 puff Inhalation Daily Manpreet Bruner MD         PRN Meds: •  acetaminophen  •  albuterol  •  cyclobenzaprine  •  hydrALAZINE    Laboratory Results:  Results from last 7 days   Lab Units 10/20/22  0431 10/19/22  2335 10/19/22  0714 10/19/22  0531 10/18/22  0847 10/18/22  0157 10/18/22  0148 10/17/22  2031 10/17/22  1504 10/17/22  1003 10/16/22  2032 10/15/22  0611   WBC Thousand/uL 7 75 8 95 10 26*  --   --  8 81  --   --   --  12 10* 14 13* 6 23   HEMOGLOBIN g/dL 5 8* 6 4* 7 9*  --   --  7 9*  --   --   --  9 9* 10 2* 10 3*   HEMATOCRIT % 18 1* 20 6* 25 6*  --   --  24 9*  --   --   --  32 8* 32 3* 32 3*   PLATELETS Thousands/uL 116* 148* 140*  --   --  117*  --   --   --  171 183 122*   SODIUM mmol/L 140 136  --  139 136  --  135 134* 136 137 135 139   POTASSIUM mmol/L 4 3 5 3  --  4 6 5 5*  --  5 7* 6 7* 6 4* 5 7* 5 9* 4 8   CHLORIDE mmol/L 104 100  --  105 102  --  103 103 104 105 104 108   CO2 mmol/L 30 30  --  26 25  --  29 27 25 25 28 25   BUN mg/dL 110* 106*  --  100* 86*  --  90* 90* 82* 78* 90* 72*   CREATININE mg/dL 2 64* 2 74*  --  2 72* 2 78*  --  2 68* 2 78* 2 82* 2 61* 2 93* 2 66*   CALCIUM mg/dL 7 6* 7 7*  --  8 0* 8 5  --  8 4 8 3 8 6 8 5 8 2* 8 2*   MAGNESIUM mg/dL  --   --   --   --   --   --   --   --   --  2 6  --   --    PHOSPHORUS mg/dL  --   --   --   --   --   --   --   --   --  5 2*  --   --

## 2022-10-21 LAB
ABO GROUP BLD BPU: NORMAL
ABO GROUP BLD BPU: NORMAL
ANION GAP SERPL CALCULATED.3IONS-SCNC: 6 MMOL/L (ref 4–13)
BPU ID: NORMAL
BPU ID: NORMAL
BUN SERPL-MCNC: 91 MG/DL (ref 5–25)
CALCIUM SERPL-MCNC: 7.9 MG/DL (ref 8.3–10.1)
CHLORIDE SERPL-SCNC: 105 MMOL/L (ref 96–108)
CO2 SERPL-SCNC: 29 MMOL/L (ref 21–32)
CREAT SERPL-MCNC: 2.44 MG/DL (ref 0.6–1.3)
CROSSMATCH: NORMAL
CROSSMATCH: NORMAL
ERYTHROCYTE [DISTWIDTH] IN BLOOD BY AUTOMATED COUNT: 16.1 % (ref 11.6–15.1)
GFR SERPL CREATININE-BSD FRML MDRD: 24 ML/MIN/1.73SQ M
GLUCOSE SERPL-MCNC: 159 MG/DL (ref 65–140)
GLUCOSE SERPL-MCNC: 167 MG/DL (ref 65–140)
GLUCOSE SERPL-MCNC: 243 MG/DL (ref 65–140)
GLUCOSE SERPL-MCNC: 274 MG/DL (ref 65–140)
GLUCOSE SERPL-MCNC: 284 MG/DL (ref 65–140)
GLUCOSE SERPL-MCNC: 311 MG/DL (ref 65–140)
GLUCOSE SERPL-MCNC: 392 MG/DL (ref 65–140)
HCT VFR BLD AUTO: 19.1 % (ref 36.5–49.3)
HCT VFR BLD AUTO: 21.4 % (ref 36.5–49.3)
HCT VFR BLD AUTO: 22.3 % (ref 36.5–49.3)
HGB BLD-MCNC: 6.2 G/DL (ref 12–17)
HGB BLD-MCNC: 7.2 G/DL (ref 12–17)
HGB BLD-MCNC: 7.3 G/DL (ref 12–17)
MCH RBC QN AUTO: 28.9 PG (ref 26.8–34.3)
MCHC RBC AUTO-ENTMCNC: 32.7 G/DL (ref 31.4–37.4)
MCV RBC AUTO: 88 FL (ref 82–98)
PLATELET # BLD AUTO: 110 THOUSANDS/UL (ref 149–390)
PMV BLD AUTO: 10.8 FL (ref 8.9–12.7)
POTASSIUM SERPL-SCNC: 4.7 MMOL/L (ref 3.5–5.3)
RBC # BLD AUTO: 2.53 MILLION/UL (ref 3.88–5.62)
SODIUM SERPL-SCNC: 140 MMOL/L (ref 135–147)
UNIT DISPENSE STATUS: NORMAL
UNIT DISPENSE STATUS: NORMAL
UNIT PRODUCT CODE: NORMAL
UNIT PRODUCT CODE: NORMAL
UNIT PRODUCT VOLUME: 350 ML
UNIT PRODUCT VOLUME: 350 ML
UNIT RH: NORMAL
UNIT RH: NORMAL
WBC # BLD AUTO: 12.33 THOUSAND/UL (ref 4.31–10.16)

## 2022-10-21 PROCEDURE — 85027 COMPLETE CBC AUTOMATED: CPT | Performed by: PHYSICIAN ASSISTANT

## 2022-10-21 PROCEDURE — 97163 PT EVAL HIGH COMPLEX 45 MIN: CPT

## 2022-10-21 PROCEDURE — 85018 HEMOGLOBIN: CPT | Performed by: PHYSICIAN ASSISTANT

## 2022-10-21 PROCEDURE — 85014 HEMATOCRIT: CPT | Performed by: PHYSICIAN ASSISTANT

## 2022-10-21 PROCEDURE — 99232 SBSQ HOSP IP/OBS MODERATE 35: CPT | Performed by: INTERNAL MEDICINE

## 2022-10-21 PROCEDURE — 80048 BASIC METABOLIC PNL TOTAL CA: CPT | Performed by: PHYSICIAN ASSISTANT

## 2022-10-21 PROCEDURE — 97166 OT EVAL MOD COMPLEX 45 MIN: CPT

## 2022-10-21 PROCEDURE — C9113 INJ PANTOPRAZOLE SODIUM, VIA: HCPCS | Performed by: STUDENT IN AN ORGANIZED HEALTH CARE EDUCATION/TRAINING PROGRAM

## 2022-10-21 PROCEDURE — 82948 REAGENT STRIP/BLOOD GLUCOSE: CPT

## 2022-10-21 PROCEDURE — 99232 SBSQ HOSP IP/OBS MODERATE 35: CPT | Performed by: NURSE PRACTITIONER

## 2022-10-21 PROCEDURE — P9016 RBC LEUKOCYTES REDUCED: HCPCS

## 2022-10-21 PROCEDURE — 94760 N-INVAS EAR/PLS OXIMETRY 1: CPT

## 2022-10-21 PROCEDURE — 94640 AIRWAY INHALATION TREATMENT: CPT

## 2022-10-21 RX ORDER — INSULIN ASPART 100 [IU]/ML
40 INJECTION, SUSPENSION SUBCUTANEOUS
Status: DISCONTINUED | OUTPATIENT
Start: 2022-10-22 | End: 2022-10-23

## 2022-10-21 RX ORDER — PANTOPRAZOLE SODIUM 40 MG/10ML
40 INJECTION, POWDER, LYOPHILIZED, FOR SOLUTION INTRAVENOUS EVERY 12 HOURS SCHEDULED
Status: DISCONTINUED | OUTPATIENT
Start: 2022-10-21 | End: 2022-10-22

## 2022-10-21 RX ORDER — LANOLIN ALCOHOL/MO/W.PET/CERES
3 CREAM (GRAM) TOPICAL
Status: DISCONTINUED | OUTPATIENT
Start: 2022-10-21 | End: 2022-10-24 | Stop reason: HOSPADM

## 2022-10-21 RX ORDER — INSULIN ASPART 100 [IU]/ML
30 INJECTION, SUSPENSION SUBCUTANEOUS
Status: DISCONTINUED | OUTPATIENT
Start: 2022-10-22 | End: 2022-10-23

## 2022-10-21 RX ADMIN — ISODIUM CHLORIDE 3 ML: 0.03 SOLUTION RESPIRATORY (INHALATION) at 20:53

## 2022-10-21 RX ADMIN — CARVEDILOL 6.25 MG: 6.25 TABLET, FILM COATED ORAL at 08:50

## 2022-10-21 RX ADMIN — ISOSORBIDE MONONITRATE 30 MG: 30 TABLET, EXTENDED RELEASE ORAL at 08:47

## 2022-10-21 RX ADMIN — ASPIRIN 81 MG: 81 TABLET, COATED ORAL at 08:47

## 2022-10-21 RX ADMIN — TAMSULOSIN HYDROCHLORIDE 0.4 MG: 0.4 CAPSULE ORAL at 17:52

## 2022-10-21 RX ADMIN — MUPIROCIN: 20 OINTMENT TOPICAL at 08:52

## 2022-10-21 RX ADMIN — PANTOPRAZOLE SODIUM 40 MG: 40 INJECTION, POWDER, FOR SOLUTION INTRAVENOUS at 21:19

## 2022-10-21 RX ADMIN — FERROUS SULFATE TAB 325 MG (65 MG ELEMENTAL FE) 325 MG: 325 (65 FE) TAB at 08:49

## 2022-10-21 RX ADMIN — LEVALBUTEROL HYDROCHLORIDE 1.25 MG: 1.25 SOLUTION, CONCENTRATE RESPIRATORY (INHALATION) at 20:53

## 2022-10-21 RX ADMIN — Medication 3 MG: at 21:19

## 2022-10-21 RX ADMIN — LEVALBUTEROL HYDROCHLORIDE 1.25 MG: 1.25 SOLUTION, CONCENTRATE RESPIRATORY (INHALATION) at 13:37

## 2022-10-21 RX ADMIN — NICOTINE 7 MG: 7 PATCH, EXTENDED RELEASE TRANSDERMAL at 08:47

## 2022-10-21 RX ADMIN — GABAPENTIN 100 MG: 100 CAPSULE ORAL at 17:51

## 2022-10-21 RX ADMIN — INSULIN LISPRO 5 UNITS: 100 INJECTION, SOLUTION INTRAVENOUS; SUBCUTANEOUS at 12:07

## 2022-10-21 RX ADMIN — INSULIN ASPART 35 UNITS: 100 INJECTION, SUSPENSION SUBCUTANEOUS at 15:16

## 2022-10-21 RX ADMIN — ISODIUM CHLORIDE 3 ML: 0.03 SOLUTION RESPIRATORY (INHALATION) at 13:37

## 2022-10-21 RX ADMIN — INSULIN LISPRO 4 UNITS: 100 INJECTION, SOLUTION INTRAVENOUS; SUBCUTANEOUS at 17:53

## 2022-10-21 RX ADMIN — FLUTICASONE FUROATE AND VILANTEROL TRIFENATATE 1 PUFF: 100; 25 POWDER RESPIRATORY (INHALATION) at 08:51

## 2022-10-21 RX ADMIN — GABAPENTIN 100 MG: 100 CAPSULE ORAL at 21:19

## 2022-10-21 RX ADMIN — CARVEDILOL 6.25 MG: 6.25 TABLET, FILM COATED ORAL at 17:54

## 2022-10-21 RX ADMIN — ISODIUM CHLORIDE 3 ML: 0.03 SOLUTION RESPIRATORY (INHALATION) at 07:38

## 2022-10-21 RX ADMIN — INSULIN LISPRO 1 UNITS: 100 INJECTION, SOLUTION INTRAVENOUS; SUBCUTANEOUS at 08:50

## 2022-10-21 RX ADMIN — FUROSEMIDE 40 MG: 40 TABLET ORAL at 08:48

## 2022-10-21 RX ADMIN — GUAIFENESIN 600 MG: 600 TABLET, EXTENDED RELEASE ORAL at 17:52

## 2022-10-21 RX ADMIN — PRAVASTATIN SODIUM 80 MG: 80 TABLET ORAL at 17:51

## 2022-10-21 RX ADMIN — GABAPENTIN 100 MG: 100 CAPSULE ORAL at 08:47

## 2022-10-21 RX ADMIN — INSULIN LISPRO 2 UNITS: 100 INJECTION, SOLUTION INTRAVENOUS; SUBCUTANEOUS at 21:21

## 2022-10-21 RX ADMIN — SALINE NASAL SPRAY 1 SPRAY: 1.5 SOLUTION NASAL at 08:52

## 2022-10-21 RX ADMIN — UMECLIDINIUM 1 PUFF: 62.5 AEROSOL, POWDER ORAL at 08:51

## 2022-10-21 RX ADMIN — GUAIFENESIN 600 MG: 600 TABLET, EXTENDED RELEASE ORAL at 08:47

## 2022-10-21 RX ADMIN — LEVALBUTEROL HYDROCHLORIDE 1.25 MG: 1.25 SOLUTION, CONCENTRATE RESPIRATORY (INHALATION) at 07:38

## 2022-10-21 RX ADMIN — INSULIN ASPART 30 UNITS: 100 INJECTION, SUSPENSION SUBCUTANEOUS at 18:03

## 2022-10-21 RX ADMIN — LIDOCAINE HYDROCHLORIDE 10 ML: 20 SOLUTION ORAL; TOPICAL at 13:31

## 2022-10-21 RX ADMIN — LIDOCAINE 5% 1 PATCH: 700 PATCH TOPICAL at 08:47

## 2022-10-21 NOTE — PROGRESS NOTES
[facility]  Progress Note - Mita Holman Sr  1945, 78 y o  male MRN: [id number]  Unit/Bed#: [facility] 627-01 Encounter: [id number]  Primary Care Provider: Nadya Flores MD   Date and time admitted to hospital: 10/16/2022  8:22 PM    * Acute on chronic respiratory failure with hypoxia and hypercapnia [facility])  Assessment & Plan  Presenting with complaints of SOB  At baseline uses 2 L NC as needed however was acutely hypoxic on admission requiring 3-4 L NC   · VBG on admission: pH 7 23, CO2 69, O2 52, HCO3 28  · Currently, on room air with SpO2 mid-low 90s at rest   · Monitor and titrate supplemental oxygen as needed   · Pulmonology input appreciated, do not suspect recurrent COPD exacerbation  Completed short course of PO Prednisone on 10/19  · Respiratory protocol     Stage 3b chronic kidney disease (CKD) [facility])  Assessment & Plan  Lab Results   Component Value Date    EGFR 24 10/21/2022    EGFR 22 10/20/2022    EGFR 21 10/19/2022    CREATININE 2 44 (H) 10/21/2022    CREATININE 2 64 (H) 10/20/2022    CREATININE 2 74 (H) 10/19/2022     Evaluated by nephrology on recent admission, recent baseline has been in the low 2s and was discharged with creatinine of 2 6  · Creatinine is currently stable in mid-2s at this time   · Continue home dose diuretics   · Avoid nephrotoxins and hypotension   · Will need outpatient nephrology follow up  · Monitor BMP     Possible COPD exacerbation [facility])  Assessment & Plan  Recent admission for COPD exacerbation, discharge on 10/15 with 5 days prednisone  Re-presents due to acute onset shortness of breath  On admission found to by hypoxic with respiratory acidosis    · CXR on admission: Stable appearance of volume loss and chronic opacities in the right   · Continued on prednisone 40 mg daily on admission -- continued through 10/19   · Continue with Breo/Incruse 100/25/62 5 daily  · Guaifenesin for expectoration 600mg twice daily  · Xopenex round-the-clock and p r n  Elner Clos · Pulm recs appreciated, do not suspect exacerbation but do recommend he complete prednisone through 10/19     Chronic diastolic (congestive) heart failure (HCC)  Assessment & Plan  Wt Readings from Last 3 Encounters:   10/21/22 103 kg (227 lb 14 4 oz)   10/15/22 98 7 kg (217 lb 11 1 oz)   09/17/22 101 kg (222 lb)   · Currently examines euvolemic   · Most recent echo with LVEF 60%  · Continue home dose Coreg and Lasix  · Monitor volume status     Anemia  Assessment & Plan  · Noted, appears baseline Hgb has been around 12  Hgb has been slowly trending down  Recently started on Eliquis for AFib, currently on hold  · Iron panel, B12, folate unremarkable   · Pt reports intermittent episodes of epistaxis which appears mild -- ENT evaluated patient, degree of anemia would not be explained with mild nosebleed, and has since resolved with Afrin/holding Eliquis   · They recommend Bactroban BID x 2 weeks, then 2-3x/week after that   · Status post EGD 10/20 which showed bleeding ulcer with visible vessel status post epi, ablation and hemo spray  · Continue to hold Eliquis  · Continue PPI  · Has had 1 unit PRBC this admission, 10/20  If hemoglobin continues to trend down, plan for repeat EGD  A-fib [facility])  Assessment & Plan  · On AC with Eliquis, continue to hold    · Continue home dose Coreg     Type 2 diabetes mellitus with hyperglycemia, with long-term current use of insulin [facility])  Assessment & Plan  Lab Results   Component Value Date    HGBA1C 9 7 (H) 10/10/2022       Recent Labs     10/20/22  1715 10/20/22  2054 10/21/22  0005 10/21/22  0809   POCGLU 265* 376* 284* 167*       · Patient reporting labile blood sugars since discharge, as high as in the 300s and as low as 20  · Recently discharged on new dosing regimen of Humalog 75/25 - 50 units, 40 units, 30 units with breakfast lunch and dinner respectively  · Appreciate endocrinology consult and recommendations -- continue insulin as per their recommendations  · Carb controlled diet   · Hypoglycemia protocol     GI bleeding  Assessment & Plan  · As above     Essential hypertension  Assessment & Plan  · Hypertensive urgency on admission, now improved   · Continue home dose Coreg and Lasix  · Monitor routinely     Hyperkalemia  Assessment & Plan  Likely related to hyperglycemia and CKD   · Continue low potassium diet  · Danay Route was discontinued   · Resolved with hyperkalemia medications     CAD (coronary artery disease)  Assessment & Plan  Status post prior stenting, presenting with SOB as above but denies chest pain (though reported to other providers he did have chest pain)   · NM stress test in May 2022 unremarkable   · Troponin trend negative and EKG without ischemic changes   · Continue aspirin, statin, beta-blocker   · Cardiology input appreciated, do not recommend any additional ischemic work up at this time, added Imdur     HLD (hyperlipidemia)  Assessment & Plan  · On pravastatin 80 mg daily          VTE Pharmacologic Prophylaxis: VTE Score: 7 Moderate Risk (Score 3-4) - Pharmacological DVT Prophylaxis Contraindicated  Sequential Compression Devices Ordered  Patient Centered Rounds: I performed bedside rounds with nursing staff today  Discussions with Specialists or Other Care Team Provider: nursing, cm, GI    Education and Discussions with Family / Patient: Patient declined call to   Time Spent for Care: 30 minutes  More than 50% of total time spent on counseling and coordination of care as described above  Current Length of Stay: 5 day(s)  Current Patient Status: Inpatient     Certification Statement: The patient will continue to require additional inpatient hospital stay due to close monitoring of hemoglobin     Discharge Plan: Anticipate discharge in 24-48 hrs to home  Code Status: Level 1 - Full Code    Subjective:   Patient offers no complaints at this time, states he's tired  Asking for regular diet  Objective:     Vitals:   Temp (24hrs), Av 1 °F (36 7 °C), Min:97 8 °F (36 6 °C), Max:98 8 °F (37 1 °C)    Temp:  [97 8 °F (36 6 °C)-98 8 °F (37 1 °C)] 98 8 °F (37 1 °C)  HR:  [] 106  Resp:  [16-20] 16  BP: (101-167)/(55-87) 141/59  SpO2:  [93 %-99 %] 99 %  Body mass index is 30 91 kg/m²  Input and Output Summary (last 24 hours): Intake/Output Summary (Last 24 hours) at 10/21/2022 1404  Last data filed at 10/21/2022 0843  Gross per 24 hour   Intake 650 ml   Output 1680 ml   Net -1030 ml       Physical Exam:   Physical Exam  Vitals and nursing note reviewed  Constitutional:       General: He is not in acute distress  Appearance: He is obese  HENT:      Nose: No signs of injury or congestion  Right Nostril: No epistaxis  Left Nostril: No epistaxis  Cardiovascular:      Rate and Rhythm: Normal rate  Rhythm irregular  Pulmonary:      Effort: No respiratory distress  Breath sounds: Normal breath sounds  No wheezing or rhonchi  Abdominal:      General: Bowel sounds are normal  There is no distension  Palpations: Abdomen is soft  Musculoskeletal:         General: Swelling present  Cervical back: No tenderness  Right lower le+ Pitting Edema present  Left lower le+ Pitting Edema present  Skin:     General: Skin is warm and dry  Comments: B/L PVD   Neurological:      Mental Status: He is alert and oriented to person, place, and time  Mental status is at baseline  Psychiatric:         Mood and Affect: Affect is flat           Additional Data:     Labs:  Results from last 7 days   Lab Units 10/21/22  0812 10/20/22  1632 10/20/22  1056 10/17/22  1003 10/16/22  2032   WBC Thousand/uL 12 33*  --  9 86   < > 14 13*   HEMOGLOBIN g/dL 7 3*   < > 6 6*   < > 10 2*   HEMATOCRIT % 22 3*   < > 20 8*   < > 32 3*   PLATELETS Thousands/uL 110*  --  117*   < > 183   BANDS PCT %  --   --   --   --  1   NEUTROS PCT %  --   --  79*   < >  --    LYMPHS PCT % --   --  9*   < >  --    LYMPHO PCT %  --   --   --   --  17   MONOS PCT %  --   --  10   < >  --    MONO PCT %  --   --   --   --  6   EOS PCT %  --   --  0   < > 5    < > = values in this interval not displayed  Results from last 7 days   Lab Units 10/21/22  0812 10/20/22  0431 10/19/22  2335   SODIUM mmol/L 140   < > 136   POTASSIUM mmol/L 4 7   < > 5 3   CHLORIDE mmol/L 105   < > 100   CO2 mmol/L 29   < > 30   BUN mg/dL 91*   < > 106*   CREATININE mg/dL 2 44*   < > 2 74*   ANION GAP mmol/L 6   < > 6   CALCIUM mg/dL 7 9*   < > 7 7*   ALBUMIN g/dL  --   --  2 9*   TOTAL BILIRUBIN mg/dL  --   --  0 26   ALK PHOS U/L  --   --  47   ALT U/L  --   --  53   AST U/L  --   --  47*   GLUCOSE RANDOM mg/dL 159*   < > 329*    < > = values in this interval not displayed           Results from last 7 days   Lab Units 10/21/22  1109 10/21/22  0809 10/21/22  0005 10/20/22  2054 10/20/22  1715 10/20/22  1403 10/20/22  1054 10/20/22  0735 10/19/22  2206 10/19/22  2048 10/19/22  1709 10/19/22  1150   POC GLUCOSE mg/dl 311* 167* 284* 376* 265* 221* 195* 141* 313* 235* 184* 203*               Lines/Drains:  Invasive Devices  Report    Peripheral Intravenous Line  Duration           Peripheral IV 10/20/22 Right Antecubital 1 day    Peripheral IV 10/20/22 Right;Ventral (anterior) Forearm 1 day                      Imaging: Reviewed radiology reports from this admission including: chest xray    Recent Cultures (last 7 days):         Last 24 Hours Medication List:   Current Facility-Administered Medications   Medication Dose Route Frequency Provider Last Rate   • acetaminophen  650 mg Oral Q6H PRN Manuel Rodriguez MD     • al mag oxide-diphenhydramine-lidocaine viscous  10 mL Swish & Swallow Q4H PRN Leora Dominguez MD     • albuterol  2 5 mg Nebulization Q4H PRN Ronald Garces MD     • aspirin  81 mg Oral Daily Manuel Rodriguez MD     • carvedilol  6 25 mg Oral BID With Meals Patt Kendrick PA-C     • cyclobenzaprine  10 mg Oral BID PRN Ricardo Spears MD     • EPINEPHrine   Intravenous Code/Trauma/Sedation Med JANETH [facility], DO     • ferrous sulfate  325 mg Oral Daily With Breakfast Sebas Rojas MD     • fluticasone-vilanterol  1 puff Inhalation Daily Ricardo Spears MD     • furosemide  40 mg Oral Daily Dori Peña MD     • gabapentin  100 mg Oral TID Ricardo Spears MD     • guaiFENesin  600 mg Oral BID Ricardo Spears MD     • hydrALAZINE  5 mg Intravenous Q6H PRN David Oshea PA-C     • insulin aspart protamine-insulin aspart  30 Units Subcutaneous Before Nina Ortiz MD     • insulin aspart protamine-insulin aspart  35 Units Subcutaneous Daily Before Lunch Tapanuwatomisin Janece Burkitt, MD     • insulin aspart protamine-insulin aspart  50 Units Subcutaneous Daily With Breakfast Ricardo Spears MD     • insulin lispro  1-5 Units Subcutaneous HS Ricardo Spears MD     • insulin lispro  1-6 Units Subcutaneous TID AC Ricardo Spears MD     • isosorbide mononitrate  30 mg Oral Daily Kia Miles DO     • levalbuterol  1 25 mg Nebulization TID Ricardo Spears MD      And   • sodium chloride  3 mL Nebulization TID Ricardo Spears MD     • lidocaine  1 patch Topical Daily Ricardo Spears MD     • melatonin  3 mg Oral HS YOLANDE Fine     • mupirocin   Topical BID Vito Oliveira MD     • nicotine  7 mg Transdermal Daily Ricardo Spears MD     • pantoprazole  40 mg Oral Early Morning Ricardo Spears MD     • pravastatin  80 mg Oral Daily With Nina Ortiz MD     • sodium chloride  1 spray Each Nare 4x Daily Vito Oliveira MD     • tamsulosin  0 4 mg Oral Daily With Nina Ortiz MD     • umeclidinium bromide  1 puff Inhalation Daily Ricardo Spears MD          Today, Patient Was Seen By: Katharine Han    **Please Note: This note may have been constructed using a voice recognition system  **

## 2022-10-21 NOTE — ASSESSMENT & PLAN NOTE
Wt Readings from Last 3 Encounters:   10/21/22 103 kg (227 lb 14 4 oz)   10/15/22 98 7 kg (217 lb 11 1 oz)   09/17/22 101 kg (222 lb)   · Currently examines euvolemic   · Most recent echo with LVEF 60%  · Continue home dose Coreg and Lasix  · Monitor volume status

## 2022-10-21 NOTE — PHYSICAL THERAPY NOTE
Physical Therapy Evaluation     Patient's Name: Pamela Saavedra      Admitting Diagnosis  Hyperkalemia [E87 5]  High blood pressure [I10]  Hyperglycemia [R73 9]  Hypoxia [R09 02]  COPD exacerbation (HCC) [J44 1]  COPD with acute exacerbation (HCC) [J44 1]  Acute on chronic respiratory acidosis (HCC) [J96 02, J96 12]    Problem List  Patient Active Problem List   Diagnosis    Possible COPD exacerbation ([facility] )    HLD (hyperlipidemia)    Type 2 diabetes mellitus with hyperglycemia, with long-term current use of insulin (HCC)    Venous stasis dermatitis of both lower extremities    COPD, severe (HCC)    Pleural effusion on right    Chronic diastolic heart failure (HCC)    Essential hypertension    Diabetic neuropathy (HCC)    Chronic diastolic (congestive) heart failure (HCC)    CAD (coronary artery disease)    Acute metabolic encephalopathy    RBBB    Oxygen dependent    COPD with acute exacerbation (HCC)    Pulmonary hypertension (HCC)    JACQUELINE (obstructive sleep apnea)    Lung nodule    DDD (degenerative disc disease), lumbar    Anemia, iron deficiency    PAD (peripheral artery disease) (HCC)    Acute on chronic respiratory failure with hypoxia and hypercapnia (HCC)    Fracture of navicular bone of left foot    Adenocarcinoma of lung (HCC)    History of prostate cancer    Stage 3b chronic kidney disease (CKD) (HCC)    SOB (shortness of breath)    Chronic left-sided low back pain without sciatica    Epididymitis, bilateral    Hypothermia    Medication management    Chest pain, musculoskeletal    Frequent hospital admissions    [facility])    Hyperkalemia    Anemia    GI bleeding       Past Medical History  Past Medical History:   Diagnosis Date    Abdominal pain     MARANDA (acute kidney injury) ([facility] ) 6/19/2018    Cardiac disease     CHF (congestive heart failure) (HCC)     COPD, severe ([facility] )     Coronary artery disease     DDD (degenerative disc disease), lumbar 9/1/2020    Diabetes mellitus (HCC)     Dyspnea     GERD (gastroesophageal reflux disease)     Hyperlipidemia     Hypertension     MI (myocardial infarction) ([address] )     with 3 stents    Nodule of apex of right lung     JACQUELINE (obstructive sleep apnea)     Prostate cancer [facility])        Past Surgical History  Past Surgical History:   Procedure Laterality Date    ABDOMINAL SURGERY      exploratory    ANGIOPLASTY      3 stents    APPENDECTOMY      COLONOSCOPY  2015    CT NEEDLE BIOPSY LUNG  11/3/2020    ESOPHAGOGASTRODUODENOSCOPY N/A 10/2/2017    Procedure: ESOPHAGOGASTRODUODENOSCOPY (EGD); Surgeon: Julius Tavarez MD;  Location: [facility]; Service: Gastroenterology    IR THORACENTESIS  11/3/2020    KNEE CARTILAGE SURGERY      OTHER SURGICAL HISTORY      stent placement    PROSTATE SURGERY      SKIN GRAFT      Basal cell CA back        10/21/22 1155   PT Last Visit   PT Visit Date 10/21/22   Note Type   Note type Evaluation   Pain Assessment   Pain Assessment Tool FLACC   Pain Location/Orientation Other (Comment)  (throat)   Hospital Pain Intervention(s) Repositioned   Pain Rating: FLACC (Rest) - Face 0   Pain Rating: FLACC (Rest) - Legs 0   Pain Rating: FLACC (Rest) - Activity 0   Pain Rating: FLACC (Rest) - Cry 0   Pain Rating: FLACC (Rest) - Consolability 0   Score: FLACC (Rest) 0   Pain Rating: FLACC (Activity) - Face 1   Pain Rating: FLACC (Activity) - Legs 0   Pain Rating: FLACC (Activity) - Activity 0   Pain Rating: FLACC (Activity) - Cry 1   Pain Rating: FLACC (Activity) - Consolability 0   Score: FLACC (Activity) 2   Restrictions/Precautions   Other Precautions Impulsive;Multiple lines;O2   Home Living   Type of Home House   Home Layout Two level; Able to live on main level with bedroom/bathroom  (0 VINI)   Home Equipment Jayjay Samuel  (for community distance mobility)   Prior Function   Level of [place] Independent with functional mobility   Lives With Spouse   Receives Help From Family   Cognition   Orientation Level Oriented X4   RLE Assessment   RLE Assessment WFL   LLE Assessment   LLE Assessment WFL   Coordination   Movements are Fluid and Coordinated 0   Coordination and Movement Description impulsive   Bed Mobility   Supine to Sit 5  Supervision   Sit to Supine Unable to assess   Additional Comments Pt left resting in chair as requested, call bell in reach   Transfers   Sit to Stand 5  Supervision   Stand to Sit 5  Supervision   Ambulation/Elevation   Gait pattern Shuffling; Short stride;Decreased foot clearance   Gait Assistance 5  Supervision   Additional items Assist x 1   Assistive Device None   Distance 4   Balance   Static Sitting Fair   Dynamic Sitting Fair -   Static Standing Fair -   Ambulatory Fair -   Endurance Deficit   Endurance Deficit Yes   Endurance Deficit Description limited by fatigue compared to baseline mobility   Activity Tolerance   Activity Tolerance Patient limited by fatigue   Nurse Made Aware yes, nsg gave clearance to work with pt, aware of pt request for pain medication   Assessment   Prognosis Good   Problem List Decreased endurance; Impaired balance;Decreased mobility; Decreased cognition;Decreased safety awareness;Pain   Assessment Pt is 78 y o  male seen for PT evaluation s/p admit to [facility] on 10/16/2022 w/ Acute on chronic respiratory failure with hypoxia and hypercapnia ([facility] )  PT consulted to assess pt's functional mobility and d/c needs  Order placed for PT eval and tx, w/ up w/ A order  Comorbidities affecting pt's physical performance at time of assessment include:  has a past medical history of Abdominal pain, MARANDA (acute kidney injury) ([address] ), Cardiac disease, CHF (congestive heart failure) ([address] ), COPD, severe ([address] ), Coronary artery disease, DDD (degenerative disc disease), lumbar, Diabetes mellitus ([address] ), Dyspnea, GERD (gastroesophageal reflux disease), Hyperlipidemia, Hypertension, MI (myocardial infarction) ([address] ), Nodule of apex of right lung, JACQUELINE (obstructive sleep apnea), and Prostate cancer ([address] )   PTA, pt was ambulates community distances and elevations and lives in multi level home although has first floor setup  Pt reports using SPC for community distances  Personal factors affecting pt at time of IE include: limited home support, impulsivity, unable to perform physical activity, inability to perform IADLs and inability to perform ADLs  Please find objective findings from PT assessment regarding body systems outlined above with impairments and limitations including impaired balance, decreased endurance, gait deviations, pain, decreased activity tolerance, decreased functional mobility tolerance, decreased safety awareness, fall risk and SOB upon exertion  Pt agreeable to minimal mobility this session with reports of increased pain in throat  Demonstrated ability to complete bed mobility with S  Required S for transfers with impulsivity  [place] for mobility with poor upright posture and attempting to reach for objects for stability  Pt will benefit from inpt skilled PT to maximize functional mobility & safety to prevent risk of falls  The following objective measures performed on IE also reveal limitations: AM-PAC 6-Clicks: 14/49  Pt's clinical presentation is currently unstable/unpredictable seen in pt's presentation of ongoing medical workup  Pt to benefit from continued PT tx to address deficits as defined above and maximize level of functional independent mobility and consistency  From PT/mobility standpoint, recommendation at time of d/c would be home with home health rehabilitation pending progress in order to facilitate return to PLOF  Barriers to Discharge Inaccessible home environment;Decreased caregiver support   Goals   Patient Goals To have my bed changed and medication for my throat   STG Expiration Date 11/02/22   Short Term Goal #1 1  Complete bed mobility and transfers I to decrease need for caregiver in home   2  Ambulate 300' I to complete household and community mobility without A  3  Improve dynamic balance to good to decrease need for UE support during ambulation  Plan   Treatment/Interventions OT; Spoke to nursing   PT Frequency 2-3x/wk   Recommendation   PT Discharge Recommendation Home with home health rehabilitation   [address] Mobility Inpatient   Turning in Bed Without Bedrails 4   Lying on Back to Sitting on Edge of Flat Bed 4   Moving Bed to Chair 4   Standing Up From Chair 4   Walk in Room 3   Climb 3-5 Stairs 2   Basic Mobility Inpatient Raw Score 21   Basic Mobility Standardized Score 45 55   Highest Level Of Mobility   JH-HLM Goal 6: Walk 10 steps or more   JH-HLM Achieved 5: Stand (1 or more minutes)           Kristi Show

## 2022-10-21 NOTE — PROGRESS NOTES
NEPHROLOGY PROGRESS NOTE   Bobby Mane Sr  78 y o  male MRN: [id number]  Unit/Bed#: [facility] 627-01 Encounter: [id number]      ASSESSMENT/PLAN:  1  MARANDA, POA: possibly related to GI bleed and some progression of CKD   · Creatinine plateaued 9 2-7 3 but now trending down to 2 44 today   · Bladder scan nonsignficant  · Recent UA without proteinuria or hematuria   · Hold ARB   · Lasix on hold given that he was NPO yesterday and had acute drop in hgb  2  CKD IIIB-IV: baseline creatinine 1 4-1 7 due to HTN and DM   3  Azotemia: due to GI bleed + steroids  BUN down to 91 today    4  Anemia of CKD + GI bleed: hgb was up to 8 4 last night but then dropped to 7 2  Now fairly stable at 7 3 today    · +FOBT  · S/p EGD yesterday showing duodenal ulcer s/p epi injection, argon plasma and hemospray   · S/p transfusions   · Iron sat 23%   5  Diastolic CHF: lasix currently on hold  Consider starting later today if hgb remains stable   · Does have some edema but is comfortable on room air   6  COPD with recent exacerbation: tapering off steroids   7  Afib: eliquis on hold     Plan Summary:   • Check am BMP   • Consider restarting lasix later today if hgb stable     SUBJECTIVE:  No chest pain or shortness of breath  Complains of being hungry today -- on a clear liquid diet currently       OBJECTIVE:  Current Weight: Weight - Scale: 103 kg (227 lb 14 4 oz)  Vitals:    10/21/22 0848   BP: 141/59   Pulse: (!) 106   Resp:    Temp:    SpO2: 98%       Intake/Output Summary (Last 24 hours) at 10/21/2022 1028  Last data filed at 10/21/2022 0843  Gross per 24 hour   Intake 650 ml   Output 1930 ml   Net -1280 ml       General:  appears comfortable and in no acute distress   Skin:  No rash, warm, good skin turgor   Eyes:  Sclerae anicteric, no periorbital edema   ENT:  Moist mucous membranes  Neck:  Trachea midline, symmetric   Chest:  Clear to auscultation bilaterally with no wheezes, rales or rhonchi  CVS:  Regular rate and rhythm  Abdomen: Soft, nontender, nondistended  Neuro:  Awake and alert  Psych:  Appropriate affect  Extremities: +1 lower extremity edema     Medications:  Scheduled Meds:  Current Facility-Administered Medications   Medication Dose Route Frequency Provider Last Rate   • acetaminophen  650 mg Oral Q6H PRN Ricardo Spears MD     • albuterol  2 5 mg Nebulization Q4H PRN Renea Morales MD     • aspirin  81 mg Oral Daily Ricardo Spears MD     • carvedilol  6 25 mg Oral BID With Meals David Oshea PA-C     • cyclobenzaprine  10 mg Oral BID PRN Ricardo Spears MD     • EPINEPHrine   Intravenous Code/Trauma/Sedation Med Aaron [employer], DO     • ferrous sulfate  325 mg Oral Daily With Breakfast Ricardo Spears MD     • fluticasone-vilanterol  1 puff Inhalation Daily Ricardo Spears MD     • furosemide  40 mg Oral Daily Dori Peña MD     • gabapentin  100 mg Oral TID Ricardo Spears MD     • guaiFENesin  600 mg Oral BID Ricardo Spears MD     • hydrALAZINE  5 mg Intravenous Q6H PRN David Oshea PA-C     • insulin aspart protamine-insulin aspart  30 Units Subcutaneous Before Ninablanca Ortiz MD     • insulin aspart protamine-insulin aspart  35 Units Subcutaneous Daily Before Lunch Oluwatomisin Janece Burkitt, MD     • insulin aspart protamine-insulin aspart  50 Units Subcutaneous Daily With Breakfast Ricardo Spears MD     • insulin lispro  1-5 Units Subcutaneous HS Ricardo Spears MD     • insulin lispro  1-6 Units Subcutaneous TID AC Ricardo Spears MD     • isosorbide mononitrate  30 mg Oral Daily Kia Miles DO     • levalbuterol  1 25 mg Nebulization TID Ricardo Spears MD      And   • sodium chloride  3 mL Nebulization TID Ricardo Spears MD     • lidocaine  1 patch Topical Daily Ricardo Spears MD     • melatonin  3 mg Oral HS YOLANDE Fine     • mupirocin   Topical BID Vito Oliveira MD     • nicotine  7 mg Transdermal Daily Margaret Bryant MD     • pantoprazole  40 mg Oral Early Morning Margaret Bryant MD     • pravastatin  80 mg Oral Daily With John Bender MD     • sodium chloride  1 spray Each Nare 4x Daily Familia Escobar MD     • tamsulosin  0 4 mg Oral Daily With John Bender MD     • umeclidinium bromide  1 puff Inhalation Daily Margaret Bryant MD         PRN Meds: •  acetaminophen  •  albuterol  •  cyclobenzaprine  •  EPINEPHrine  •  hydrALAZINE    Laboratory Results:  Results from last 7 days   Lab Units 10/21/22  0812 10/21/22  0007 10/20/22  1632 10/20/22  1056 10/20/22  0431 10/19/22  2335 10/19/22  0714 10/19/22  0531 10/18/22  0847 10/18/22  0157 10/18/22  0148 10/17/22  2031 10/17/22  1504 10/17/22  1003   WBC Thousand/uL 12 33*  --   --  9 86 7 75 8 95 10 26*  --   --  8 81  --   --   --  12 10*   HEMOGLOBIN g/dL 7 3* 7 2* 8 4* 6 6* 5 8* 6 4* 7 9*  --   --  7 9*  --   --   --  9 9*   HEMATOCRIT % 22 3* 21 4* 25 3* 20 8* 18 1* 20 6* 25 6*  --   --  24 9*  --   --   --  32 8*   PLATELETS Thousands/uL 110*  --   --  117* 116* 148* 140*  --   --  117*  --   --   --  171   SODIUM mmol/L 140  --   --   --  140 136  --  139 136  --  135 134*   < > 137   POTASSIUM mmol/L 4 7  --   --   --  4 3 5 3  --  4 6 5 5*  --  5 7* 6 7*   < > 5 7*   CHLORIDE mmol/L 105  --   --   --  104 100  --  105 102  --  103 103   < > 105   CO2 mmol/L 29  --   --   --  30 30  --  26 25  --  29 27   < > 25   BUN mg/dL 91*  --   --   --  110* 106*  --  100* 86*  --  90* 90*   < > 78*   CREATININE mg/dL 2 44*  --   --   --  2 64* 2 74*  --  2 72* 2 78*  --  2 68* 2 78*   < > 2 61*   CALCIUM mg/dL 7 9*  --   --   --  7 6* 7 7*  --  8 0* 8 5  --  8 4 8 3   < > 8 5   MAGNESIUM mg/dL  --   --   --   --   --   --   --   --   --   --   --   --   --  2 6   PHOSPHORUS mg/dL  --   --   --   --   --   --   --   --   --   --   --   --   --  5 2*    < > = values in this interval not displayed

## 2022-10-21 NOTE — QUICK NOTE
Chart reviewed  Glucose trend noted  Met with patient at bedside  Appeared uncomfortable, reports difficulty with swallowing after his EGD procedure yesterday  Completed course of steroids  Currently on clears  Recommend decreasing insulin regimen to Nph 40 units with breakfast, 30 units with lunch and dinner with correctional insulin

## 2022-10-21 NOTE — ASSESSMENT & PLAN NOTE
Likely related to hyperglycemia and CKD   · Continue low potassium diet  · Ileene Mainor was discontinued   · Resolved with hyperkalemia medications

## 2022-10-21 NOTE — PLAN OF CARE
Problem: Potential for Falls  Goal: Patient will remain free of falls  Description: INTERVENTIONS:  - Educate patient/family on patient safety including physical limitations  - Instruct patient to call for assistance with activity   - Consult OT/PT to assist with strengthening/mobility   - Keep Call bell within reach  - Keep bed low and locked with side rails adjusted as appropriate  - Keep care items and personal belongings within reach  - Initiate and maintain comfort rounds  - Make Fall Risk Sign visible to staff  - Offer Toileting every 2 Hours, in advance of need  - Initiate/Maintain tabs alarm  - Obtain necessary fall risk management equipment  - Apply yellow socks and bracelet for high fall risk patients  - Consider moving patient to room near nurses station  Outcome: Progressing     Problem: PAIN - ADULT  Goal: Verbalizes/displays adequate comfort level or baseline comfort level  Description: Interventions:  - Encourage patient to monitor pain and request assistance  - Assess pain using appropriate pain scale  - Administer analgesics based on type and severity of pain and evaluate response  - Implement non-pharmacological measures as appropriate and evaluate response  - Consider cultural and social influences on pain and pain management  - Notify physician/advanced practitioner if interventions unsuccessful or patient reports new pain  Outcome: Progressing     Problem: INFECTION - ADULT  Goal: Absence or prevention of progression during hospitalization  Description: INTERVENTIONS:  - Assess and monitor for signs and symptoms of infection  - Monitor lab/diagnostic results  - Monitor all insertion sites, i e  indwelling lines, tubes, and drains  - Monitor endotracheal if appropriate and nasal secretions for changes in amount and color  - [place] appropriate cooling/warming therapies per order  - Administer medications as ordered  - Instruct and encourage patient and family to use good hand hygiene technique  - Identify and instruct in appropriate isolation precautions for identified infection/condition  Outcome: Progressing  Goal: Absence of fever/infection during neutropenic period  Description: INTERVENTIONS:  - Monitor WBC    Outcome: Progressing     Problem: SAFETY ADULT  Goal: Patient will remain free of falls  Description: INTERVENTIONS:  - Educate patient/family on patient safety including physical limitations  - Instruct patient to call for assistance with activity   - Consult OT/PT to assist with strengthening/mobility   - Keep Call bell within reach  - Keep bed low and locked with side rails adjusted as appropriate  - Keep care items and personal belongings within reach  - Initiate and maintain comfort rounds  - Make Fall Risk Sign visible to staff  - Offer Toileting every 2 Hours, in advance of need  - Initiate/Maintain tabs alarm  - Obtain necessary fall risk management equipment  - Apply yellow socks and bracelet for high fall risk patients  - Consider moving patient to room near nurses station  Outcome: Progressing  Goal: Maintain or return to baseline ADL function  Description: INTERVENTIONS:  - Educate patient/family on patient safety including physical limitations  - Instruct patient to call for assistance with activity   - Consult OT/PT to assist with strengthening/mobility   - Keep Call bell within reach  - Keep bed low and locked with side rails adjusted as appropriate  - Keep care items and personal belongings within reach  - Initiate and maintain comfort rounds  - Make Fall Risk Sign visible to staff  - Offer Toileting every 2 Hours, in advance of need  - Initiate/Maintain bed alarm  - Obtain necessary fall risk management equipment  - Apply yellow socks and bracelet for high fall risk patients  - Consider moving patient to room near nurses station  Outcome: Progressing  Goal: Maintains/Returns to pre admission functional level  Description: INTERVENTIONS:  - Perform BMAT or MOVE assessment daily    - Set and communicate daily mobility goal to care team and patient/family/caregiver  - Collaborate with rehabilitation services on mobility goals if consulted  - Perform Range of Motion  times a day  - Reposition patient every  hours  - Dangle patient  times a day  - Stand patient  times a day  - Ambulate patient  times a day  - Out of bed to chair  times a day   - Out of bed for meals  times a day  - Out of bed for toileting  - Record patient progress and toleration of activity level   Outcome: Progressing     Problem: DISCHARGE PLANNING  Goal: Discharge to home or other facility with appropriate resources  Description: INTERVENTIONS:  - Identify barriers to discharge w/patient and caregiver  - Arrange for needed discharge resources and transportation as appropriate  - Identify discharge learning needs (meds, wound care, etc )  - Arrange for interpretive services to assist at discharge as needed  - Refer to Case Management Department for coordinating discharge planning if the patient needs post-hospital services based on physician/advanced practitioner order or complex needs related to functional status, cognitive ability, or social support system  Outcome: Progressing     Problem: Knowledge Deficit  Goal: Patient/family/caregiver demonstrates understanding of disease process, treatment plan, medications, and discharge instructions  Description: Complete learning assessment and assess knowledge base    Interventions:  - Provide teaching at level of understanding  - Provide teaching via preferred learning methods  Outcome: Progressing     Problem: RESPIRATORY - ADULT  Goal: Achieves optimal ventilation and oxygenation  Description: INTERVENTIONS:  - Assess for changes in respiratory status  - Assess for changes in mentation and behavior  - Position to facilitate oxygenation and minimize respiratory effort  - Oxygen administered by appropriate delivery if ordered  - Initiate smoking cessation education as indicated  - Encourage broncho-pulmonary hygiene including cough, deep breathe, Incentive Spirometry  - Assess the need for suctioning and aspirate as needed  - Assess and instruct to report SOB or any respiratory difficulty  - Respiratory Therapy support as indicated  Outcome: Progressing     Problem: Prexisting or High Potential for Compromised Skin Integrity  Goal: Skin integrity is maintained or improved  Description: INTERVENTIONS:  - Identify patients at risk for skin breakdown  - Assess and monitor skin integrity  - Assess and monitor nutrition and hydration status  - Monitor labs   - Assess for incontinence   - Turn and reposition patient  - Assist with mobility/ambulation  - Relieve pressure over bony prominences  - Avoid friction and shearing  - Provide appropriate hygiene as needed including keeping skin clean and dry  - Evaluate need for skin moisturizer/barrier cream  - Collaborate with interdisciplinary team   - Patient/family teaching  - Consider wound care consult   Outcome: Progressing     Problem: MOBILITY - ADULT  Goal: Maintain or return to baseline ADL function  Description: INTERVENTIONS:  - Educate patient/family on patient safety including physical limitations  - Instruct patient to call for assistance with activity   - Consult OT/PT to assist with strengthening/mobility   - Keep Call bell within reach  - Keep bed low and locked with side rails adjusted as appropriate  - Keep care items and personal belongings within reach  - Initiate and maintain comfort rounds  - Make Fall Risk Sign visible to staff  - Offer Toileting every 2 Hours, in advance of need  - Initiate/Maintain bed alarm  - Obtain necessary fall risk management equipment  - Apply yellow socks and bracelet for high fall risk patients  - Consider moving patient to room near nurses station  Outcome: Progressing  Goal: Maintains/Returns to pre admission functional level  Description: INTERVENTIONS:  - Perform BMAT or MOVE assessment daily    - Set and communicate daily mobility goal to care team and patient/family/caregiver  - Collaborate with rehabilitation services on mobility goals if consulted  - Perform Range of Motion  times a day  - Reposition patient every  hours    - Dangle patient  times a day  - Stand patient  times a day  - Ambulate patient  times a day  - Out of bed to chair times a day   - Out of bed for meals  times a day  - Out of bed for toileting  - Record patient progress and toleration of activity level   Outcome: Progressing

## 2022-10-21 NOTE — PLAN OF CARE
Problem: PHYSICAL THERAPY ADULT  Goal: Performs mobility at highest level of function for planned discharge setting  See evaluation for individualized goals  Description: Treatment/Interventions: OT, Spoke to nursing          See flowsheet documentation for full assessment, interventions and recommendations  Note: Prognosis: Good  Problem List: Decreased endurance, Impaired balance, Decreased mobility, Decreased cognition, Decreased safety awareness, Pain  Assessment: Pt is 78 y o  male seen for PT evaluation s/p admit to [facility] on 10/16/2022 w/ Acute on chronic respiratory failure with hypoxia and hypercapnia ([facility] [address] )  PT consulted to assess pt's functional mobility and d/c needs  Order placed for PT eval and tx, w/ up w/ A order  Comorbidities affecting pt's physical performance at time of assessment include:  has a past medical history of Abdominal pain, MARANDA (acute kidney injury) ([facility] [address] ), Cardiac disease, CHF (congestive heart failure) ([facility] [address] ), COPD, severe ([facility] [address] ), Coronary artery disease, DDD (degenerative disc disease), lumbar, Diabetes mellitus ([facility] [address] ), Dyspnea, GERD (gastroesophageal reflux disease), Hyperlipidemia, Hypertension, MI (myocardial infarction) ([facility] [address] ), Nodule of apex of right lung, JACQUELINE (obstructive sleep apnea), and Prostate cancer ([facility] [address] )  PTA, pt was ambulates community distances and elevations and lives in multi level home although has first floor setup  Pt reports using SPC for community distances  Personal factors affecting pt at time of IE include: limited home support, impulsivity, unable to perform physical activity, inability to perform IADLs and inability to perform ADLs   Please find objective findings from PT assessment regarding body systems outlined above with impairments and limitations including impaired balance, decreased endurance, gait deviations, pain, decreased activity tolerance, decreased functional mobility tolerance, decreased safety awareness, fall risk and SOB upon exertion  Pt agreeable to minimal mobility this session with reports of increased pain in throat  Demonstrated ability to complete bed mobility with S  Required S for transfers with impulsivity  Flory for mobility with poor upright posture and attempting to reach for objects for stability  Pt will benefit from inpt skilled PT to maximize functional mobility & safety to prevent risk of falls  The following objective measures performed on IE also reveal limitations: AM-PAC 6-Clicks: 90/94  Pt's clinical presentation is currently unstable/unpredictable seen in pt's presentation of ongoing medical workup  Pt to benefit from continued PT tx to address deficits as defined above and maximize level of functional independent mobility and consistency  From PT/mobility standpoint, recommendation at time of d/c would be home with home health rehabilitation pending progress in order to facilitate return to PLOF  Barriers to Discharge: Inaccessible home environment, Decreased caregiver support     PT Discharge Recommendation: Home with home health rehabilitation    See flowsheet documentation for full assessment

## 2022-10-21 NOTE — PROGRESS NOTES
Consultation - [facility]  Cheri Isbell Sr  78 y o  male MRN: [id number]  Unit/Bed#: [facility] 627-01 Encounter: [id number]        [address]    Reason for Consult / Principal Problem:   Acute Anemia        ASSESSMENT AND PLAN:    79 yo M with hx of COPD on 2L baseline, Afib on eliquis, CAD and DM on insulin presenting with acute anemia concerning for GI bleed  Acute Anemia  Patient is now s/p EGD which showed a duodenal ulcer  Epi, ablation and hemospray were used to control the bleeding  Hgb dropped from 8 4 to 7 2 overnight however this am hgb is stable at 7 3 and patient denies signs of active bleeding  Will continue to obtain serial Hgb and if patient continues to drop will get a repeat EGD    - Cont hold Eliquis  - Cont IV PPI 40mg BID for 72 hours, then oral PPI for 8-12 weeks   - Tolerating clear liquid diet   - Transfuse for Hgb < 7   - Cont serial hgb q8    Esophageal discomfort   Patient underwent EGD and now has minimal discomfort but is able to tolerate clear liquid and food  Low concern for esophageal perforation based on symptoms, and esophagus was without ulceration of inflammation on EGD  Discomfort is most likely from the scope   - Magic mouthwash ordered PRN for discomfort     ______________________________________________________________________    Subjective:   Patient is s/p EGD yesterday  He feels like he has some esophagus discomfort after the procedure, but has not spit up blood, has been tolerating solid food, denies feeling like his food is getting stuck in his throat  Denies abd pain, nausea/vomiting  Has not had a bowel movement since before the EGD, but denies blood in his rectal area  REVIEW OF SYSTEMS:    CONSTITUTIONAL: Denies any fever, chills, rigors, and weight loss  HEENT: No earache or tinnitus  Denies hearing loss or visual disturbances  CARDIOVASCULAR: No chest pain or palpitations     RESPIRATORY: Denies any cough, hemoptysis, shortness of breath or dyspnea on exertion  GASTROINTESTINAL: As noted in the History of Present Illness  GENITOURINARY: No problems with urination  Denies any hematuria or dysuria  NEUROLOGIC: No dizziness or vertigo, denies headaches  MUSCULOSKELETAL: Denies any muscle or joint pain  SKIN: Denies skin rashes or itching  ENDOCRINE: Denies excessive thirst  Denies intolerance to heat or cold  PSYCHOSOCIAL: Denies depression or anxiety  Denies any recent memory loss  Historical Information   Past Medical History:   Diagnosis Date   • Abdominal pain    • MARANDA (acute kidney injury) ([place] [address] ) 2018   • Cardiac disease    • CHF (congestive heart failure) ([facility])    • COPD, severe (HCC)    • Coronary artery disease    • DDD (degenerative disc disease), lumbar 2020   • Diabetes mellitus ([place] [address] )    • Dyspnea    • GERD (gastroesophageal reflux disease)    • Hyperlipidemia    • Hypertension    • MI (myocardial infarction) ([place] [address] )     with 3 stents   • Nodule of apex of right lung    • JACQUELINE (obstructive sleep apnea)    • Prostate cancer [facility])      Past Surgical History:   Procedure Laterality Date   • ABDOMINAL SURGERY      exploratory   • ANGIOPLASTY      3 stents   • APPENDECTOMY     • COLONOSCOPY     • CT NEEDLE BIOPSY LUNG  11/3/2020   • ESOPHAGOGASTRODUODENOSCOPY N/A 10/2/2017    Procedure: ESOPHAGOGASTRODUODENOSCOPY (EGD); Surgeon: Phyllis Nath MD;  Location: [facility];   Service: Gastroenterology   • IR THORACENTESIS  11/3/2020   • KNEE CARTILAGE SURGERY     • OTHER SURGICAL HISTORY      stent placement   • PROSTATE SURGERY     • SKIN GRAFT      Basal cell CA back     Social History   Social History     Substance and Sexual Activity   Alcohol Use Never     Social History     Substance and Sexual Activity   Drug Use No     Social History     Tobacco Use   Smoking Status Former Smoker   • Packs/day: 1 50   • Years: 50 00   • Pack years: 75 00   • Start date: 38   • Quit date: 2020   • Years since quittin 2 Smokeless Tobacco Never Used     Family History   Problem Relation Age of Onset   • Heart disease Father    • Other Father         Mesothelioma        Meds/Allergies     Medications Prior to Admission   Medication   • apixaban (ELIQUIS) 5 mg   • aspirin (ECOTRIN LOW STRENGTH) 81 mg EC tablet   • carvedilol (COREG) 6 25 mg tablet   • cyclobenzaprine (FLEXERIL) 10 mg tablet   • ferrous sulfate 325 (65 Fe) mg tablet   • fluticasone-umeclidinium-vilanterol (Trelegy Ellipta) 100-62 5-25 MCG/INH inhaler   • furosemide (LASIX) 40 mg tablet   • gabapentin (NEURONTIN) 100 mg capsule   • glucose blood test strip   • insulin lispro protamine-insulin lispro (HumaLOG Mix 75/25) 100 units/mL   • lidocaine (Lidoderm) 5 %   • pantoprazole (PROTONIX) 40 mg tablet   • potassium chloride (K-DUR,KLOR-CON) 20 mEq tablet   • simvastatin (ZOCOR) 40 mg tablet   • tamsulosin (FLOMAX) 0 4 mg   • umeclidinium bromide (INCRUSE ELLIPTA) 62 5 mcg/inh AEPB inhaler     Current Facility-Administered Medications   Medication Dose Route Frequency   • acetaminophen (TYLENOL) tablet 650 mg  650 mg Oral Q6H PRN   • al mag oxide-diphenhydramine-lidocaine viscous (MAGIC MOUTHWASH) suspension 10 mL  10 mL Swish & Swallow Q4H PRN   • albuterol inhalation solution 2 5 mg  2 5 mg Nebulization Q4H PRN   • aspirin (ECOTRIN LOW STRENGTH) EC tablet 81 mg  81 mg Oral Daily   • carvedilol (COREG) tablet 6 25 mg  6 25 mg Oral BID With Meals   • cyclobenzaprine (FLEXERIL) tablet 10 mg  10 mg Oral BID PRN   • EPINEPHrine (ADRENALIN) injection   Intravenous Code/Trauma/Sedation Med   • ferrous sulfate tablet 325 mg  325 mg Oral Daily With Breakfast   • fluticasone-vilanterol (BREO ELLIPTA) 100-25 mcg/inh inhaler 1 puff  1 puff Inhalation Daily   • furosemide (LASIX) tablet 40 mg  40 mg Oral Daily   • gabapentin (NEURONTIN) capsule 100 mg  100 mg Oral TID   • guaiFENesin (MUCINEX) 12 hr tablet 600 mg  600 mg Oral BID   • hydrALAZINE (APRESOLINE) injection 5 mg  5 mg Intravenous Q6H PRN   • insulin aspart protamine-insulin aspart (NovoLOG 70/30) 100 units/mL subcutaneous injection 30 Units  30 Units Subcutaneous Before Dinner   • insulin aspart protamine-insulin aspart (NovoLOG 70/30) 100 units/mL subcutaneous injection 35 Units  35 Units Subcutaneous Daily Before Lunch   • insulin aspart protamine-insulin aspart (NovoLOG 70/30) 100 units/mL subcutaneous injection 50 Units  50 Units Subcutaneous Daily With Breakfast   • insulin lispro (HumaLOG) 100 units/mL subcutaneous injection 1-5 Units  1-5 Units Subcutaneous HS   • insulin lispro (HumaLOG) 100 units/mL subcutaneous injection 1-6 Units  1-6 Units Subcutaneous TID AC   • isosorbide mononitrate (IMDUR) 24 hr tablet 30 mg  30 mg Oral Daily   • levalbuterol (XOPENEX) inhalation solution 1 25 mg  1 25 mg Nebulization TID    And   • sodium chloride 0 9 % inhalation solution 3 mL  3 mL Nebulization TID   • lidocaine (LIDODERM) 5 % patch 1 patch  1 patch Topical Daily   • melatonin tablet 3 mg  3 mg Oral HS   • mupirocin (BACTROBAN) 2 % ointment   Topical BID   • nicotine (NICODERM CQ) 7 mg/24hr TD 24 hr patch 7 mg  7 mg Transdermal Daily   • pantoprazole (PROTONIX) EC tablet 40 mg  40 mg Oral Early Morning   • pravastatin (PRAVACHOL) tablet 80 mg  80 mg Oral Daily With Dinner   • sodium chloride (OCEAN) 0 65 % nasal spray 1 spray  1 spray Each Nare 4x Daily   • tamsulosin (FLOMAX) capsule 0 4 mg  0 4 mg Oral Daily With Dinner   • umeclidinium bromide (INCRUSE ELLIPTA) 62 5 mcg/inh inhaler AEPB 1 puff  1 puff Inhalation Daily       Allergies   Allergen Reactions   • Crestor [Rosuvastatin] Other (See Comments)     Unknown     • Lisinopril GI Intolerance, Dizziness and Abdominal Pain   • Metformin GI Intolerance           Objective     Blood pressure 141/59, pulse (!) 106, temperature 98 8 °F (37 1 °C), resp  rate 16, height 6' (1 829 m), weight 103 kg (227 lb 14 4 oz), SpO2 98 %  Body mass index is 30 91 kg/m²        Intake/Output Summary (Last 24 hours) at 10/21/2022 1117  Last data filed at 10/21/2022 0843  Gross per 24 hour   Intake 650 ml   Output 1930 ml   Net -1280 ml         PHYSICAL EXAM:      General Appearance:   Alert, cooperative, no distress   HEENT:   Normocephalic, atraumatic, anicteric      Neck:  Supple, symmetrical, trachea midline   Lungs:   Clear to auscultation bilaterally; no rales, rhonchi or wheezing; respirations unlabored    Heart[de-identified]   Regular rate and rhythm; no murmur, rub, or gallop  Abdomen:   Soft, non-tender, non-distended; normal bowel sounds; no masses, no organomegaly    Genitalia:   Deferred    Rectal:   Deferred    Extremities:  No cyanosis, clubbing or edema    Pulses:  2+ and symmetric all extremities    Skin:  No jaundice, rashes, or lesions    Lymph nodes:  No palpable cervical lymphadenopathy        Lab Results:   No results displayed because visit has over 200 results  Imaging Studies: I have personally reviewed pertinent imaging studies

## 2022-10-21 NOTE — ASSESSMENT & PLAN NOTE
Recent admission for COPD exacerbation, discharge on 10/15 with 5 days prednisone  Re-presents due to acute onset shortness of breath  On admission found to by hypoxic with respiratory acidosis  · CXR on admission: Stable appearance of volume loss and chronic opacities in the right   · Continued on prednisone 40 mg daily on admission -- continued through 10/19   · Continue with Breo/Incruse 100/25/62 5 daily  · Guaifenesin for expectoration 600mg twice daily  · Xopenex round-the-clock and p r n  Ernesto Batista   · Pulm recs appreciated, do not suspect exacerbation but do recommend he complete prednisone through 10/19

## 2022-10-21 NOTE — QUICK NOTE
GI quick note  -----------------    · Patient had drop in hemoglobin to 7 3 however repeat was normal  · Initial plan for relook EGD today however patient had his breakfast   Given stability of hemoglobin and no further episodes of melena can have patient on clear liquid diet and if patient is to rebleed will perform an EGD for evaluation  · If patient is to become hemodynamically unstable recommend IR consultation for embolization of GDA  · Hold anticoagulation today, if hemoglobin stable can be restarted tomorrow    Valentina Qureshi MD   Gastroenterology Fellow

## 2022-10-21 NOTE — ASSESSMENT & PLAN NOTE
Status post prior stenting, presenting with SOB as above but denies chest pain (though reported to other providers he did have chest pain)   · NM stress test in May 2022 unremarkable   · Troponin trend negative and EKG without ischemic changes   · Continue aspirin, statin, beta-blocker   · Cardiology input appreciated, do not recommend any additional ischemic work up at this time, added Imdur

## 2022-10-21 NOTE — ASSESSMENT & PLAN NOTE
Lab Results   Component Value Date    EGFR 24 10/21/2022    EGFR 22 10/20/2022    EGFR 21 10/19/2022    CREATININE 2 44 (H) 10/21/2022    CREATININE 2 64 (H) 10/20/2022    CREATININE 2 74 (H) 10/19/2022     Evaluated by nephrology on recent admission, recent baseline has been in the low 2s and was discharged with creatinine of 2 6  · Creatinine is currently stable in mid-2s at this time   · Continue home dose diuretics   · Avoid nephrotoxins and hypotension   · Will need outpatient nephrology follow up  · Monitor BMP

## 2022-10-21 NOTE — OCCUPATIONAL THERAPY NOTE
Occupational Therapy Evaluation     Patient Name: Tammie Tate  Today's Date: 10/21/2022  Problem List  Principal Problem:    Acute on chronic respiratory failure with hypoxia and hypercapnia (HCC)  Active Problems:    Possible COPD exacerbation (HCC)    HLD (hyperlipidemia)    Type 2 diabetes mellitus with hyperglycemia, with long-term current use of insulin (HCC)    Essential hypertension    Chronic diastolic (congestive) heart failure (HCC)    CAD (coronary artery disease)    Stage 3b chronic kidney disease (CKD) (HCC)    A-fib (HCC)    Hyperkalemia    Anemia    GI bleeding    Past Medical History  Past Medical History:   Diagnosis Date    Abdominal pain     MARANDA (acute kidney injury) ([facility] [address] ) 6/19/2018    Cardiac disease     CHF (congestive heart failure) (HCC)     COPD, severe (HCC)     Coronary artery disease     DDD (degenerative disc disease), lumbar 9/1/2020    Diabetes mellitus (HCC)     Dyspnea     GERD (gastroesophageal reflux disease)     Hyperlipidemia     Hypertension     MI (myocardial infarction) ([facility] [address] )     with 3 stents    Nodule of apex of right lung     JACQUELINE (obstructive sleep apnea)     Prostate cancer [facility])      Past Surgical History  Past Surgical History:   Procedure Laterality Date    ABDOMINAL SURGERY      exploratory    ANGIOPLASTY      3 stents    APPENDECTOMY      COLONOSCOPY  2015    CT NEEDLE BIOPSY LUNG  11/3/2020    ESOPHAGOGASTRODUODENOSCOPY N/A 10/2/2017    Procedure: ESOPHAGOGASTRODUODENOSCOPY (EGD); Surgeon: Candie Jim MD;  [facility];   Service: Gastroenterology    IR THORACENTESIS  11/3/2020    KNEE CARTILAGE SURGERY      OTHER SURGICAL HISTORY      stent placement    PROSTATE SURGERY      SKIN GRAFT      Basal cell CA back         10/21/22 1200   OT Last Visit   OT Visit Date 10/21/22   Note Type   Note type Evaluation   Pain Assessment   Pain Assessment Tool 0-10   Pain Score No Pain   Restrictions/Precautions   Weight Bearing Precautions Per Order No   Other Precautions O2;Multiple lines   Home Living   Type of [address] [facility] Two level; Able to live on main level with bedroom/bathroom   Home Equipment Cane   Prior Function   Level of [place] Independent with ADLs; Independent with IADLS; Independent with functional mobility   Lives With Spouse   Receives Help From Family   IADLs Independent with driving; Independent with meal prep; Independent with medication management   Falls in the last 6 months 0   Vocational Retired   [address] and mobility - i iadls - shares homemaking with family   Reciprocal Relationships supportive family   Service to Others retired   Intrinsic Gratification sedentary   General   Family/Caregiver Present No   Subjective   Subjective c/o fatigue and sore throat   ADL   Eating Assistance 7  Independent   Grooming Assistance 5  Supervision/Setup   UB Bathing Assistance 5  Supervision/Setup   LB Bathing Assistance 5  Supervision/Setup   UB Dressing Assistance 5  Supervision/Setup   LB Dressing Assistance 5  Supervision/Setup   Toileting Assistance  5  Supervision/Setup   Bed Mobility   Supine to Sit 5  Supervision   Transfers   Sit to Stand 5  Supervision   Stand to Sit 5  Supervision   Stand pivot 5  Supervision   Balance   Static Sitting Fair +   Dynamic Sitting Fair   Static Standing Fair   Dynamic Standing [address],Flo[address],4, & 5 -   Activity Tolerance   Activity Tolerance Patient limited by fatigue;Treatment limited secondary to medical complications (Comment)   RUE Assessment   RUE Assessment WFL   LUE Assessment   LUE Assessment WFL   Cognition   Overall Cognitive Status WFL   Assessment   Limitation Decreased endurance;Decreased self-care trans;Decreased high-level ADLs   Prognosis Good   Assessment Pt is a 78 y o  male who was admitted to [facility] on 10/16/2022 with Acute on chronic respiratory failure with hypoxia and hypercapnia ([facility] [address] )    Pt's problem list also includes PMH of DM, HTN, COPD, cancer history, neuropathy and HLD, venous stasis dermatitis of BLE, CHF, CAD, R BBB, O2 dependency, JACQUELINE  Lung nodule, anemia, PAD, adenocarcinoma of lung  CKD, prostate ca, hypothermia, afib  At baseline pt was completing adls and mobility, I iadls - shares homemaking with family  Pt lives with spouse in 2 story home with [address]  Currently pt requires sba for overall ADLS and sba for functional mobility/transfers  Pt currently presents with impairments in the following categories -difficulty performing IADLS , health management  and environment activity tolerance, endurance and standing balance/tolerance  These impairments, as well as pt's fatigue, SOB, WINTERS and risk for falls  limit pt's ability to safely engage in all baseline areas of occupation, includingfunctional mobility/transfers, community mobility, laundry , driving, house maintenance, meal prep, cleaning, social participation  and leisure activities  From OT standpoint, recommend home with family support upon D/C  No immediate OT needs indicated at this time as pt is functioning at/close to baseline - d/c from caseload   Goals   Patient Goals have my sheets changed   Plan   OT Frequency Eval only   Recommendation   OT Discharge Recommendation No rehabilitation needs   AM-PAC Daily Activity Inpatient   Lower Body Dressing 3   Bathing 3   Toileting 4   Upper Body Dressing 4   Grooming 4   Eating 4   Daily Activity Raw Score 22   Daily Activity Standardized Score (Calc for Raw Score >=11) 47  1   AM-PAC Applied Cognition Inpatient   Following a Speech/Presentation 4   Understanding Ordinary Conversation 4   Taking Medications 4   Remembering Where Things Are Placed or Put Away 4   Remembering List of 4-5 Errands 4   Taking Care of Complicated Tasks 4   Applied Cognition Raw Score 24   Applied Cognition Standardized Score 62 21   End of Consult   Patient Position at End of Consult Bedside chair; All needs within reach     The patient's raw score on the AM-PAC Daily Activity inpatient short form is 22, standardized score is 47 1, greater than 39 4  Patients at this level are likely to benefit from discharge to home  Please refer to the recommendation of the Occupational Therapist for safe discharge planning      [facility]

## 2022-10-21 NOTE — ASSESSMENT & PLAN NOTE
· Noted, appears baseline Hgb has been around 12  Hgb has been slowly trending down  Recently started on Eliquis for AFib, currently on hold  · Iron panel, B12, folate unremarkable   · Pt reports intermittent episodes of epistaxis which appears mild -- ENT evaluated patient, degree of anemia would not be explained with mild nosebleed, and has since resolved with Afrin/holding Eliquis   · They recommend Bactroban BID x 2 weeks, then 2-3x/week after that   · Status post EGD 10/20 which showed bleeding ulcer with visible vessel status post epi, ablation and hemo spray  · Continue to hold Eliquis  · Continue PPI  · Has had 1 unit PRBC this admission, 10/20  If hemoglobin continues to trend down, plan for repeat EGD

## 2022-10-21 NOTE — ASSESSMENT & PLAN NOTE
Presenting with complaints of SOB  At baseline uses 2 L NC as needed however was acutely hypoxic on admission requiring 3-4 L NC   · VBG on admission: pH 7 23, CO2 69, O2 52, HCO3 28  · Currently, on room air with SpO2 mid-low 90s at rest   · Monitor and titrate supplemental oxygen as needed   · Pulmonology input appreciated, do not suspect recurrent COPD exacerbation    Completed short course of PO Prednisone on 10/19  · Respiratory protocol

## 2022-10-22 LAB
ABO GROUP BLD BPU: NORMAL
ABO GROUP BLD: NORMAL
ANION GAP SERPL CALCULATED.3IONS-SCNC: 5 MMOL/L (ref 4–13)
BASOPHILS # BLD AUTO: 0.01 THOUSANDS/ÂΜL (ref 0–0.1)
BASOPHILS NFR BLD AUTO: 0 % (ref 0–1)
BLD GP AB SCN SERPL QL: NEGATIVE
BPU ID: NORMAL
BUN SERPL-MCNC: 72 MG/DL (ref 5–25)
CALCIUM SERPL-MCNC: 7.9 MG/DL (ref 8.3–10.1)
CHLORIDE SERPL-SCNC: 106 MMOL/L (ref 96–108)
CO2 SERPL-SCNC: 29 MMOL/L (ref 21–32)
CREAT SERPL-MCNC: 2.2 MG/DL (ref 0.6–1.3)
CROSSMATCH: NORMAL
EOSINOPHIL # BLD AUTO: 0.04 THOUSAND/ÂΜL (ref 0–0.61)
EOSINOPHIL NFR BLD AUTO: 0 % (ref 0–6)
ERYTHROCYTE [DISTWIDTH] IN BLOOD BY AUTOMATED COUNT: 16.1 % (ref 11.6–15.1)
GFR SERPL CREATININE-BSD FRML MDRD: 28 ML/MIN/1.73SQ M
GLUCOSE SERPL-MCNC: 102 MG/DL (ref 65–140)
GLUCOSE SERPL-MCNC: 368 MG/DL (ref 65–140)
GLUCOSE SERPL-MCNC: 381 MG/DL (ref 65–140)
GLUCOSE SERPL-MCNC: 39 MG/DL (ref 65–140)
GLUCOSE SERPL-MCNC: 43 MG/DL (ref 65–140)
GLUCOSE SERPL-MCNC: 440 MG/DL (ref 65–140)
HCT VFR BLD AUTO: 22.1 % (ref 36.5–49.3)
HCT VFR BLD AUTO: 22.6 % (ref 36.5–49.3)
HCT VFR BLD AUTO: 24.2 % (ref 36.5–49.3)
HCT VFR BLD AUTO: 25.7 % (ref 36.5–49.3)
HGB BLD-MCNC: 7.4 G/DL (ref 12–17)
HGB BLD-MCNC: 7.5 G/DL (ref 12–17)
HGB BLD-MCNC: 7.9 G/DL (ref 12–17)
HGB BLD-MCNC: 8.3 G/DL (ref 12–17)
IMM GRANULOCYTES # BLD AUTO: 0.1 THOUSAND/UL (ref 0–0.2)
IMM GRANULOCYTES NFR BLD AUTO: 1 % (ref 0–2)
LYMPHOCYTES # BLD AUTO: 0.63 THOUSANDS/ÂΜL (ref 0.6–4.47)
LYMPHOCYTES NFR BLD AUTO: 6 % (ref 14–44)
MCH RBC QN AUTO: 28.7 PG (ref 26.8–34.3)
MCHC RBC AUTO-ENTMCNC: 32.7 G/DL (ref 31.4–37.4)
MCV RBC AUTO: 88 FL (ref 82–98)
MONOCYTES # BLD AUTO: 0.88 THOUSAND/ÂΜL (ref 0.17–1.22)
MONOCYTES NFR BLD AUTO: 9 % (ref 4–12)
NEUTROPHILS # BLD AUTO: 8.25 THOUSANDS/ÂΜL (ref 1.85–7.62)
NEUTS SEG NFR BLD AUTO: 84 % (ref 43–75)
NRBC BLD AUTO-RTO: 0 /100 WBCS
PLATELET # BLD AUTO: 88 THOUSANDS/UL (ref 149–390)
PMV BLD AUTO: 10.1 FL (ref 8.9–12.7)
POTASSIUM SERPL-SCNC: 3.9 MMOL/L (ref 3.5–5.3)
RBC # BLD AUTO: 2.58 MILLION/UL (ref 3.88–5.62)
RH BLD: POSITIVE
SODIUM SERPL-SCNC: 140 MMOL/L (ref 135–147)
SPECIMEN EXPIRATION DATE: NORMAL
UNIT DISPENSE STATUS: NORMAL
UNIT PRODUCT CODE: NORMAL
UNIT PRODUCT VOLUME: 350 ML
UNIT RH: NORMAL
WBC # BLD AUTO: 9.91 THOUSAND/UL (ref 4.31–10.16)

## 2022-10-22 PROCEDURE — 85018 HEMOGLOBIN: CPT | Performed by: NURSE PRACTITIONER

## 2022-10-22 PROCEDURE — 86850 RBC ANTIBODY SCREEN: CPT | Performed by: NURSE PRACTITIONER

## 2022-10-22 PROCEDURE — 86900 BLOOD TYPING SEROLOGIC ABO: CPT | Performed by: NURSE PRACTITIONER

## 2022-10-22 PROCEDURE — 99232 SBSQ HOSP IP/OBS MODERATE 35: CPT | Performed by: INTERNAL MEDICINE

## 2022-10-22 PROCEDURE — C9113 INJ PANTOPRAZOLE SODIUM, VIA: HCPCS | Performed by: STUDENT IN AN ORGANIZED HEALTH CARE EDUCATION/TRAINING PROGRAM

## 2022-10-22 PROCEDURE — 94640 AIRWAY INHALATION TREATMENT: CPT

## 2022-10-22 PROCEDURE — 85014 HEMATOCRIT: CPT | Performed by: PHYSICIAN ASSISTANT

## 2022-10-22 PROCEDURE — 99232 SBSQ HOSP IP/OBS MODERATE 35: CPT | Performed by: NURSE PRACTITIONER

## 2022-10-22 PROCEDURE — 82948 REAGENT STRIP/BLOOD GLUCOSE: CPT

## 2022-10-22 PROCEDURE — 85018 HEMOGLOBIN: CPT | Performed by: PHYSICIAN ASSISTANT

## 2022-10-22 PROCEDURE — 85014 HEMATOCRIT: CPT | Performed by: NURSE PRACTITIONER

## 2022-10-22 PROCEDURE — 86901 BLOOD TYPING SEROLOGIC RH(D): CPT | Performed by: NURSE PRACTITIONER

## 2022-10-22 PROCEDURE — 94760 N-INVAS EAR/PLS OXIMETRY 1: CPT

## 2022-10-22 PROCEDURE — 85025 COMPLETE CBC W/AUTO DIFF WBC: CPT | Performed by: NURSE PRACTITIONER

## 2022-10-22 PROCEDURE — 80048 BASIC METABOLIC PNL TOTAL CA: CPT | Performed by: PHYSICIAN ASSISTANT

## 2022-10-22 RX ORDER — PANTOPRAZOLE SODIUM 40 MG/1
40 TABLET, DELAYED RELEASE ORAL
Status: DISCONTINUED | OUTPATIENT
Start: 2022-10-22 | End: 2022-10-24 | Stop reason: HOSPADM

## 2022-10-22 RX ORDER — INSULIN ASPART 100 [IU]/ML
25 INJECTION, SUSPENSION SUBCUTANEOUS
Status: DISCONTINUED | OUTPATIENT
Start: 2022-10-22 | End: 2022-10-23

## 2022-10-22 RX ADMIN — UMECLIDINIUM 1 PUFF: 62.5 AEROSOL, POWDER ORAL at 08:21

## 2022-10-22 RX ADMIN — ASPIRIN 81 MG: 81 TABLET, COATED ORAL at 10:48

## 2022-10-22 RX ADMIN — ISOSORBIDE MONONITRATE 30 MG: 30 TABLET, EXTENDED RELEASE ORAL at 08:11

## 2022-10-22 RX ADMIN — PRAVASTATIN SODIUM 80 MG: 80 TABLET ORAL at 17:10

## 2022-10-22 RX ADMIN — INSULIN ASPART 30 UNITS: 100 INJECTION, SUSPENSION SUBCUTANEOUS at 12:23

## 2022-10-22 RX ADMIN — FLUTICASONE FUROATE AND VILANTEROL TRIFENATATE 1 PUFF: 100; 25 POWDER RESPIRATORY (INHALATION) at 08:21

## 2022-10-22 RX ADMIN — SALINE NASAL SPRAY 1 SPRAY: 1.5 SOLUTION NASAL at 21:52

## 2022-10-22 RX ADMIN — CARVEDILOL 6.25 MG: 6.25 TABLET, FILM COATED ORAL at 17:10

## 2022-10-22 RX ADMIN — ISODIUM CHLORIDE 3 ML: 0.03 SOLUTION RESPIRATORY (INHALATION) at 19:12

## 2022-10-22 RX ADMIN — GUAIFENESIN 600 MG: 600 TABLET, EXTENDED RELEASE ORAL at 17:10

## 2022-10-22 RX ADMIN — PANTOPRAZOLE SODIUM 40 MG: 40 TABLET, DELAYED RELEASE ORAL at 17:10

## 2022-10-22 RX ADMIN — INSULIN LISPRO 6 UNITS: 100 INJECTION, SOLUTION INTRAVENOUS; SUBCUTANEOUS at 17:12

## 2022-10-22 RX ADMIN — INSULIN LISPRO 6 UNITS: 100 INJECTION, SOLUTION INTRAVENOUS; SUBCUTANEOUS at 12:23

## 2022-10-22 RX ADMIN — GABAPENTIN 100 MG: 100 CAPSULE ORAL at 08:11

## 2022-10-22 RX ADMIN — PANTOPRAZOLE SODIUM 40 MG: 40 INJECTION, POWDER, FOR SOLUTION INTRAVENOUS at 08:11

## 2022-10-22 RX ADMIN — GABAPENTIN 100 MG: 100 CAPSULE ORAL at 21:52

## 2022-10-22 RX ADMIN — INSULIN ASPART 25 UNITS: 100 INJECTION, SUSPENSION SUBCUTANEOUS at 17:10

## 2022-10-22 RX ADMIN — Medication 3 MG: at 21:52

## 2022-10-22 RX ADMIN — LEVALBUTEROL HYDROCHLORIDE 1.25 MG: 1.25 SOLUTION, CONCENTRATE RESPIRATORY (INHALATION) at 19:12

## 2022-10-22 RX ADMIN — FERROUS SULFATE TAB 325 MG (65 MG ELEMENTAL FE) 325 MG: 325 (65 FE) TAB at 08:11

## 2022-10-22 RX ADMIN — LIDOCAINE 5% 1 PATCH: 700 PATCH TOPICAL at 08:18

## 2022-10-22 RX ADMIN — ISODIUM CHLORIDE 3 ML: 0.03 SOLUTION RESPIRATORY (INHALATION) at 13:34

## 2022-10-22 RX ADMIN — LEVALBUTEROL HYDROCHLORIDE 1.25 MG: 1.25 SOLUTION, CONCENTRATE RESPIRATORY (INHALATION) at 08:22

## 2022-10-22 RX ADMIN — FUROSEMIDE 40 MG: 40 TABLET ORAL at 08:11

## 2022-10-22 RX ADMIN — GUAIFENESIN 600 MG: 600 TABLET, EXTENDED RELEASE ORAL at 08:11

## 2022-10-22 RX ADMIN — NICOTINE 7 MG: 7 PATCH, EXTENDED RELEASE TRANSDERMAL at 08:21

## 2022-10-22 RX ADMIN — ISODIUM CHLORIDE 3 ML: 0.03 SOLUTION RESPIRATORY (INHALATION) at 08:22

## 2022-10-22 RX ADMIN — LEVALBUTEROL HYDROCHLORIDE 1.25 MG: 1.25 SOLUTION, CONCENTRATE RESPIRATORY (INHALATION) at 13:34

## 2022-10-22 RX ADMIN — TAMSULOSIN HYDROCHLORIDE 0.4 MG: 0.4 CAPSULE ORAL at 17:10

## 2022-10-22 RX ADMIN — CARVEDILOL 6.25 MG: 6.25 TABLET, FILM COATED ORAL at 08:11

## 2022-10-22 RX ADMIN — APIXABAN 5 MG: 5 TABLET, FILM COATED ORAL at 17:10

## 2022-10-22 RX ADMIN — APIXABAN 5 MG: 5 TABLET, FILM COATED ORAL at 10:48

## 2022-10-22 RX ADMIN — INSULIN LISPRO 3 UNITS: 100 INJECTION, SOLUTION INTRAVENOUS; SUBCUTANEOUS at 21:52

## 2022-10-22 RX ADMIN — GABAPENTIN 100 MG: 100 CAPSULE ORAL at 17:12

## 2022-10-22 NOTE — PROGRESS NOTES
[address] [facility]  Progress Note - Allison Rahman Sr  1945, 78 y o  male MRN: [id number]  [facility] Encounter: [id number]  Primary Care Provider: Carlos Sagastume MD   Date and time admitted to hospital: 10/16/2022  8:22 PM    * Acute on chronic respiratory failure with hypoxia and hypercapnia [facility])  Assessment & Plan  Presenting with complaints of SOB  At baseline uses 2 L NC as needed however was acutely hypoxic on admission requiring 3-4 L NC   · VBG on admission: pH 7 23, CO2 69, O2 52, HCO3 28  · Currently, on room air with SpO2 mid-low 90s at rest   · Monitor and titrate supplemental oxygen as needed   · Pulmonology input appreciated, do not suspect recurrent COPD exacerbation  Completed short course of PO Prednisone on 10/19  · Respiratory protocol     Anemia  Assessment & Plan  · Noted, appears baseline Hgb has been around 12  Hgb has been slowly trending down  · Pt reports intermittent episodes of epistaxis which appears mild -- ENT evaluated patient, degree of anemia would not be explained with mild nosebleed, and has since resolved with Afrin/holding Eliquis   · They recommend Bactroban BID x 2 weeks, then 2-3x/week after that   · Status post EGD 10/20 which showed bleeding ulcer with visible vessel status post epi, ablation and hemo spray  · Continue PPI b i d  For 8-12 weeks  · Per GI, okay to resume Eliquis today and monitor  Will also allow for diet however will keep NPO after midnight in event that there is evidence of re-bleeding  Possible COPD exacerbation ([facility] [address] )  Assessment & Plan  Recent admission for COPD exacerbation, discharge on 10/15 with 5 days prednisone  Re-presents due to acute onset shortness of breath  On admission found to by hypoxic with respiratory acidosis    · CXR on admission: Stable appearance of volume loss and chronic opacities in the right   · Continued on prednisone 40 mg daily on admission -- continued through 10/19 · Continue with Breo/Incruse 100/25/62 5 daily  · Guaifenesin for expectoration 600mg twice daily  · Xopenex round-the-clock and p r n  Wiliam Victor · Pulm recs appreciated, do not suspect exacerbation but do recommend he complete prednisone through 10/19     GI bleeding  Assessment & Plan  · As above     Stage 3b chronic kidney disease (CKD) [facility])  Assessment & Plan  Lab Results   Component Value Date    EGFR 28 10/22/2022    EGFR 24 10/21/2022    EGFR 22 10/20/2022    CREATININE 2 20 (H) 10/22/2022    CREATININE 2 44 (H) 10/21/2022    CREATININE 2 64 (H) 10/20/2022     Evaluated by nephrology on recent admission, recent baseline has been in the low 2s and was discharged with creatinine of 2 6  · Creatinine is currently stable in mid-2s at this time   · Continue home dose diuretics   · Avoid nephrotoxins and hypotension   · Will need outpatient nephrology follow up  · Monitor BMP     Chronic diastolic (congestive) heart failure ([address] )  Assessment & Plan  Wt Readings from Last 3 Encounters:   10/21/22 103 kg (227 lb 14 4 oz)   10/15/22 98 7 kg (217 lb 11 1 oz)   09/17/22 101 kg (222 lb)   · Currently examines euvolemic   · Most recent echo with LVEF 60%  · Continue home dose Coreg and Lasix  · Monitor volume status     A-fib ([address] )  Assessment & Plan  · Resume Eliquis today  · Continue home dose Coreg     Type 2 diabetes mellitus with hyperglycemia, with long-term current use of insulin [facility])  Assessment & Plan  Lab Results   Component Value Date    HGBA1C 9 7 (H) 10/10/2022       Recent Labs     10/21/22  1647 10/21/22  2035 10/22/22  0751 10/22/22  0822   POCGLU 274* 243* 39* 102       · Recently discharged on new dosing regimen of Humalog 75/25 - 50 units, 40 units, 30 units with breakfast lunch and dinner respectively  Reported labile blood glucose at home, as high as 300s as low as 20s  · Episode of hypoglycemia this morning  Endocrinology following, appreciate recommendations regarding dosing adjustments    · Carb controlled diet   · Hypoglycemia protocol     Essential hypertension  Assessment & Plan  · Hypertensive urgency on admission, now improved   · Continue home dose Coreg and Lasix  · Monitor routinely     Hyperkalemia  Assessment & Plan  Likely related to hyperglycemia and CKD   · Danay Route was discontinued   · Now resolved  · Monitor    CAD (coronary artery disease)  Assessment & Plan  Status post prior stenting, presenting with SOB as above but denies chest pain (though reported to other providers he did have chest pain)   · NM stress test in May 2022 unremarkable   · Troponin trend negative and EKG without ischemic changes   · Continue aspirin, statin, beta-blocker   · Cardiology input appreciated, do not recommend any additional ischemic work up at this time, added Imdur     HLD (hyperlipidemia)  Assessment & Plan  · On pravastatin 80 mg daily      VTE Pharmacologic Prophylaxis: VTE Score: 7 Moderate Risk (Score 3-4) - Pharmacological DVT Prophylaxis Ordered: apixaban (Eliquis)  Patient Centered Rounds: I performed bedside rounds with nursing staff today  Discussions with Specialists or Other Care Team Provider: nursing, endocrinology, GI fellow    Education and Discussions with Family / Patient: Patient declined call to   Time Spent for Care: 30 minutes  More than 50% of total time spent on counseling and coordination of care as described above  Current Length of Stay: 6 day(s)    Current Patient Status: Inpatient     Certification Statement: The patient will continue to require additional inpatient hospital stay due to monitor hemoglobin back on Eliquis, monitor BG     Discharge Plan: Anticipate discharge in 24-48 hrs to home  Code Status: Level 1 - Full Code    Subjective:   Patient offers no acute complaints  Denies shortness of breath  Anxious to go home  We discussed that we will be restarting blood thinner today and need to monitor blood counts and for rebleeding    Also discussed concerns regarding blood glucose this morning and endocrinology plan to adjust insulin and monitor further  Objective:     Vitals:   Temp (24hrs), Av °F (36 7 °C), Min:97 6 °F (36 4 °C), Max:98 7 °F (37 1 °C)    Temp:  [97 6 °F (36 4 °C)-98 7 °F (37 1 °C)] 97 6 °F (36 4 °C)  HR:  [] 80  Resp:  [15-18] 16  BP: (106-135)/(44-57) 135/57  SpO2:  [90 %-99 %] 95 %  Body mass index is 30 91 kg/m²  Input and Output Summary (last 24 hours): Intake/Output Summary (Last 24 hours) at 10/22/2022 1103  Last data filed at 10/21/2022 2150  Gross per 24 hour   Intake 348 33 ml   Output --   Net 348 33 ml       Physical Exam:   Physical Exam  Vitals and nursing note reviewed  Constitutional:       General: He is not in acute distress  Appearance: He is obese  Cardiovascular:      Rate and Rhythm: Normal rate  Rhythm irregular  Pulmonary:      Breath sounds: Decreased breath sounds present  Abdominal:      Palpations: Abdomen is soft  Tenderness: There is no abdominal tenderness  Musculoskeletal:         General: Swelling present  Skin:     General: Skin is warm  Findings: Bruising present  Comments: Lower extremity PVD discoloration    Neurological:      Mental Status: He is alert and oriented to person, place, and time  Mental status is at baseline  Psychiatric:         Mood and Affect: Affect is flat  Additional Data:     Labs:  Results from last 7 days   Lab Units 10/22/22  0908 10/22/22  0632 10/17/22  1003 10/16/22  2032   WBC Thousand/uL  --  9 91   < > 14 13*   HEMOGLOBIN g/dL 8 3* 7 4*   < > 10 2*   HEMATOCRIT % 25 7* 22 6*   < > 32 3*   PLATELETS Thousands/uL  --  88*   < > 183   BANDS PCT %  --   --   --  1   NEUTROS PCT %  --  84*   < >  --    LYMPHS PCT %  --  6*   < >  --    LYMPHO PCT %  --   --   --  17   MONOS PCT %  --  9   < >  --    MONO PCT %  --   --   --  6   EOS PCT %  --  0   < > 5    < > = values in this interval not displayed       Results from last 7 days   Lab Units 10/22/22  0632 10/20/22  0431 10/19/22  2335   SODIUM mmol/L 140   < > 136   POTASSIUM mmol/L 3 9   < > 5 3   CHLORIDE mmol/L 106   < > 100   CO2 mmol/L 29   < > 30   BUN mg/dL 72*   < > 106*   CREATININE mg/dL 2 20*   < > 2 74*   ANION GAP mmol/L 5   < > 6   CALCIUM mg/dL 7 9*   < > 7 7*   ALBUMIN g/dL  --   --  2 9*   TOTAL BILIRUBIN mg/dL  --   --  0 26   ALK PHOS U/L  --   --  47   ALT U/L  --   --  53   AST U/L  --   --  47*   GLUCOSE RANDOM mg/dL 43*   < > 329*    < > = values in this interval not displayed  Results from last 7 days   Lab Units 10/22/22  0822 10/22/22  0751 10/21/22  2035 10/21/22  1647 10/21/22  1515 10/21/22  1109 10/21/22  0809 10/21/22  0005 10/20/22  2054 10/20/22  1715 10/20/22  1403 10/20/22  1054   POC GLUCOSE mg/dl 102 39* 243* 274* 392* 311* 167* 284* 376* 265* 221* 195*               Lines/Drains:  Invasive Devices  Report    Peripheral Intravenous Line  Duration           Peripheral IV 10/20/22 Right Antecubital 1 day                      Imaging: No pertinent imaging reviewed      Recent Cultures (last 7 days):         Last 24 Hours Medication List:   Current Facility-Administered Medications   Medication Dose Route Frequency Provider Last Rate   • acetaminophen  650 mg Oral Q6H PRN Carmen Tamez MD     • al mag oxide-diphenhydramine-lidocaine viscous  10 mL Swish & Swallow Q4H PRN Cassandra Fairbanks MD     • albuterol  2 5 mg Nebulization Q4H PRN Dawna Rivera MD     • apixaban  5 mg Oral BID YOLANDE Fiore     • aspirin  81 mg Oral Daily Carmen aTmez MD     • carvedilol  6 25 mg Oral BID With Meals Anna Todd PA-C     • cyclobenzaprine  10 mg Oral BID PRN Carmen Tamez MD     • EPINEPHrine   Intravenous Code/Trauma/Sedation [facility], DO     • ferrous sulfate  325 mg Oral Daily With Breakfast Carmen Tamez MD     • fluticasone-vilanterol  1 puff Inhalation Daily Carmen Tamez MD • furosemide  40 mg Oral Daily Joseph Adamson MD     • gabapentin  100 mg Oral TID Yue Webster MD     • guaiFENesin  600 mg Oral BID Yue Webster MD     • hydrALAZINE  5 mg Intravenous Q6H PRN Charlotte Rock PA-C     • insulin aspart protamine-insulin aspart  30 Units Subcutaneous Before Radha Mckeon MD     • insulin aspart protamine-insulin aspart  30 Units Subcutaneous Daily Before Lunch Lolita Maddox MD     • insulin aspart protamine-insulin aspart  40 Units Subcutaneous Daily With Breakfast Lolita Maddox MD     • insulin lispro  1-5 Units Subcutaneous HS Yue Webster MD     • insulin lispro  1-6 Units Subcutaneous TID AC Yue Webster MD     • isosorbide mononitrate  30 mg Oral Daily Staci Mccartney DO     • levalbuterol  1 25 mg Nebulization TID Yue Webster MD      And   • sodium chloride  3 mL Nebulization TID Yue Webster MD     • lidocaine  1 patch Topical Daily Yue Webster MD     • melatonin  3 mg Oral HS YOLANDE Mendez     • mupirocin   Topical BID Ivan Reinoso MD     • nicotine  7 mg Transdermal Daily Yue Webster MD     • pantoprazole  40 mg Oral BID AC YOLANDE Fiore     • pravastatin  80 mg Oral Daily With Radha Mckeon MD     • sodium chloride  1 spray Each Nare 4x Daily Ivan Reinoso MD     • tamsulosin  0 4 mg Oral Daily With Radha Mckeon MD     • umeclidinium bromide  1 puff Inhalation Daily Yue Webster MD          Today, Patient Was Seen By: Gayathri Mueller    **Please Note: This note may have been constructed using a voice recognition system  **

## 2022-10-22 NOTE — ASSESSMENT & PLAN NOTE
· Noted, appears baseline Hgb has been around 12  Hgb has been slowly trending down  · Pt reports intermittent episodes of epistaxis which appears mild -- ENT evaluated patient, degree of anemia would not be explained with mild nosebleed, and has since resolved with Afrin/holding Eliquis   · They recommend Bactroban BID x 2 weeks, then 2-3x/week after that   · Status post EGD 10/20 which showed bleeding ulcer with visible vessel status post epi, ablation and hemo spray  · Continue PPI b i d  For 8-12 weeks  · Per GI, okay to resume Eliquis today and monitor  Will also allow for diet however will keep NPO after midnight in event that there is evidence of re-bleeding

## 2022-10-22 NOTE — ASSESSMENT & PLAN NOTE
Recent admission for COPD exacerbation, discharge on 10/15 with 5 days prednisone  Re-presents due to acute onset shortness of breath  On admission found to by hypoxic with respiratory acidosis  · CXR on admission: Stable appearance of volume loss and chronic opacities in the right   · Continued on prednisone 40 mg daily on admission -- continued through 10/19   · Continue with Breo/Incruse 100/25/62 5 daily  · Guaifenesin for expectoration 600mg twice daily  · Xopenex round-the-clock and p berenice Ayon Crutch   · Pulm recs appreciated, do not suspect exacerbation but do recommend he complete prednisone through 10/19

## 2022-10-22 NOTE — PLAN OF CARE
Problem: PAIN - ADULT  Goal: Verbalizes/displays adequate comfort level or baseline comfort level  Description: Interventions:  - Encourage patient to monitor pain and request assistance  - Assess pain using appropriate pain scale  - Administer analgesics based on type and severity of pain and evaluate response  - Implement non-pharmacological measures as appropriate and evaluate response  - Consider cultural and social influences on pain and pain management  - Notify physician/advanced practitioner if interventions unsuccessful or patient reports new pain  Outcome: Progressing     Problem: SAFETY ADULT  Goal: Maintain or return to baseline ADL function  Description: INTERVENTIONS:  - Educate patient/family on patient safety including physical limitations  - Instruct patient to call for assistance with activity   - Consult OT/PT to assist with strengthening/mobility   - Keep Call bell within reach  - Keep bed low and locked with side rails adjusted as appropriate  - Keep care items and personal belongings within reach  - Initiate and maintain comfort rounds  - Make Fall Risk Sign visible to staff  - Offer Toileting every 2 Hours, in advance of need  - Initiate/Maintain bed alarm  - Obtain necessary fall risk management equipment  - Apply yellow socks and bracelet for high fall risk patients  - Consider moving patient to room near nurses station  Outcome: Progressing     Problem: DISCHARGE PLANNING  Goal: Discharge to home or other facility with appropriate resources  Description: INTERVENTIONS:  - Identify barriers to discharge w/patient and caregiver  - Arrange for needed discharge resources and transportation as appropriate  - Identify discharge learning needs (meds, wound care, etc )  - Arrange for interpretive services to assist at discharge as needed  - Refer to Case Management Department for coordinating discharge planning if the patient needs post-hospital services based on physician/advanced practitioner order or complex needs related to functional status, cognitive ability, or social support system  Outcome: Progressing     Problem: Knowledge Deficit  Goal: Patient/family/caregiver demonstrates understanding of disease process, treatment plan, medications, and discharge instructions  Description: Complete learning assessment and assess knowledge base    Interventions:  - Provide teaching at level of understanding  - Provide teaching via preferred learning methods  Outcome: Progressing     Problem: RESPIRATORY - ADULT  Goal: Achieves optimal ventilation and oxygenation  Description: INTERVENTIONS:  - Assess for changes in respiratory status  - Assess for changes in mentation and behavior  - Position to facilitate oxygenation and minimize respiratory effort  - Oxygen administered by appropriate delivery if ordered  - Initiate smoking cessation education as indicated  - Encourage broncho-pulmonary hygiene including cough, deep breathe, Incentive Spirometry  - Assess the need for suctioning and aspirate as needed  - Assess and instruct to report SOB or any respiratory difficulty  - Respiratory Therapy support as indicated  Outcome: Progressing     Problem: HEMATOLOGIC - ADULT  Goal: Maintains hematologic stability  Description: INTERVENTIONS  - Assess for signs and symptoms of bleeding or hemorrhage  - Monitor labs  - Administer supportive blood products/factors as ordered and appropriate  Outcome: Progressing     Problem: MOBILITY - ADULT  Goal: Maintain or return to baseline ADL function  Description: INTERVENTIONS:  - Educate patient/family on patient safety including physical limitations  - Instruct patient to call for assistance with activity   - Consult OT/PT to assist with strengthening/mobility   - Keep Call bell within reach  - Keep bed low and locked with side rails adjusted as appropriate  - Keep care items and personal belongings within reach  - Initiate and maintain comfort rounds  - Make Fall Risk Sign visible to staff  - Offer Toileting every 2 Hours, in advance of need  - Initiate/Maintain bed alarm  - Obtain necessary fall risk management equipment  - Apply yellow socks and bracelet for high fall risk patients  - Consider moving patient to room near nurses station  Outcome: Progressing

## 2022-10-22 NOTE — PROGRESS NOTES
Progress Note - Emelia Chavez Sr  78 y o  male MRN: [id number]    Unit/Bed#: [facility] 627-01 Encounter: [id number]      CC: diabetes f/u    Subjective: This is a 78y o -year-old male with Type 2 diabetes on long-term insulin complicated by diabetic nephropathy and CAD admitting had  for COPD exacerbation  Pt had an episode of hypoglycemia in the AM      Objective:     Vitals: Blood pressure 135/57, pulse 80, temperature 97 6 °F (36 4 °C), resp  rate 16, height 6' (1 829 m), weight 103 kg (227 lb 14 4 oz), SpO2 95 %  ,Body mass index is 30 91 kg/m²  Intake/Output Summary (Last 24 hours) at 10/22/2022 1230  Last data filed at 10/21/2022 2150  Gross per 24 hour   Intake 348 33 ml   Output --   Net 348 33 ml       Physical Exam:  General Appearance: awake, appears stated age and cooperative  Head: Normocephalic, without obvious abnormality, atraumatic  Extremities: moves all extremities  Skin: Skin color and temperature normal    Pulm: no labored breathing        POC Glucose (mg/dl)   Date Value   10/22/2022 440 (H)   10/22/2022 102   10/22/2022 39 (LL)   10/21/2022 243 (H)   10/21/2022 274 (H)   10/21/2022 392 (H)   10/21/2022 311 (H)   10/21/2022 167 (H)   10/21/2022 284 (H)   10/20/2022 376 (H)     Assessment: This is a 78y o -year-old male with Type 2 diabetes on long-term insulin complicated by diabetic nephropathy and CAD admitting had  for COPD exacerbation, currently off of  steroids      Plan:  # type 2 diabetes with uncontrolled hyperglycemia  # COPD on steroids  A1c: 9 7 (10/2022)  Home regimen:  40 units with breakfast and lunch, 35U with dinner     Current regimen: Mix insulin 70/30  40 units with breakfast, 30 with lunch and 30 units with dinner  Pt had episode of hypoglycemia in the AM ( 43) drank juice and glucose improved  Recommend to decrease iMix insulin 70/30 before dinner from 30 to 25 units  Continue with current breakfast and lunch doses  Plan to advance diet to solids today  Portions of the record may have been created with voice recognition software

## 2022-10-22 NOTE — ASSESSMENT & PLAN NOTE
Lab Results   Component Value Date    EGFR 28 10/22/2022    EGFR 24 10/21/2022    EGFR 22 10/20/2022    CREATININE 2 20 (H) 10/22/2022    CREATININE 2 44 (H) 10/21/2022    CREATININE 2 64 (H) 10/20/2022     Evaluated by nephrology on recent admission, recent baseline has been in the low 2s and was discharged with creatinine of 2 6  · Creatinine is currently stable in mid-2s at this time   · Continue home dose diuretics   · Avoid nephrotoxins and hypotension   · Will need outpatient nephrology follow up  · Monitor BMP

## 2022-10-22 NOTE — CASE MANAGEMENT
Case Management Discharge Planning Note    Patient name Zahra Matias  Location [facility] 627/[facility] 581-66 MRN [id number]  : 1945 Date 10/22/2022       Current Admission Date: 10/16/2022  Current Admission Diagnosis:Acute on chronic respiratory failure with hypoxia and hypercapnia [facility])   Patient Active Problem List    Diagnosis Date Noted   • GI bleeding 10/20/2022   • Anemia 10/18/2022   • Hyperkalemia 10/17/2022   • A-fib ([address] ) 10/10/2022   • Frequent hospital admissions 2022   • Medication management 2022   • Chest pain, musculoskeletal 2022   • Hypothermia 2022   • Epididymitis, bilateral 06/15/2022   • Chronic left-sided low back pain without sciatica 2022   • SOB (shortness of breath) 2022   • Stage 3b chronic kidney disease (CKD) ([address] ) 2022   • History of prostate cancer 2022   • Adenocarcinoma of lung ([address] ) 2022   • Fracture of navicular bone of left foot 2021   • Acute on chronic respiratory failure with hypoxia and hypercapnia ([address] ) 04/15/2021   • PAD (peripheral artery disease) ([address] ) 2021   • DDD (degenerative disc disease), lumbar 2020   • Anemia, iron deficiency 2020   • Lung nodule 2020   • Pulmonary hypertension ([address] ) 2019   • JACQUELINE (obstructive sleep apnea) 2019   • COPD with [facility] exacerbation [facility] ) 2019   • Oxygen dependent 2018   • Acute metabolic encephalopathy    • RBBB 2018   • Chronic diastolic (congestive) heart failure ([address] ) 2018   • CAD (coronary artery disease) 2018   • Chronic diastolic heart failure ([address] ) 2018   • Pleural effusion on right 10/31/2017   • COPD, severe ([address] ) 2017   • Diabetic neuropathy ([facility] ) 2017   • Essential hypertension 2016   • Venous stasis dermatitis of both lower extremities 11/15/2016   • Type 2 diabetes mellitus with hyperglycemia, with long-term current use of insulin ([facility] ) 2016   • Possible COPD exacerbation ([facility] [address] ) 01/20/2016   • HLD (hyperlipidemia) 01/20/2016      LOS (days): 6  Geometric Mean LOS (GMLOS) (days): 3 50  Days to GMLOS:-2 2     OBJECTIVE:  Risk of Unplanned Readmission Score: 79 62         Current admission status: Inpatient   Preferred Pharmacy:   [address] [place] [address] [address] [address]  Phone: [phone] Fax: [phone] [facility], [facility] [address] [facility] 94 [facility] 38 210 [facility]  Phone: [phone] Fax: [phone]    Primary Care Provider: Ernesto Pastrana MD    Primary Insurance: [facility]  Secondary Insurance:     DISCHARGE DETAILS:    Discharge planning discussed with[de-identified] pt  Freedom of Choice: Yes  Comments - Freedom of Choice: cm discussed hhc rec     Were Treatment Team discharge recommendations reviewed with patient/caregiver?: Yes  Did patient/caregiver verbalize understanding of patient care needs?: Yes  Were patient/caregiver advised of the risks associated with not following Treatment Team discharge recommendations?: Yes    Contacts  Reason/Outcome: Discharge [address]         Is the patient interested in Lu Antunez at discharge?: Yes  [address] Freddie Aguilar requested[de-identified] Physical Therapy, [address] Name[de-identified] 2010 [facility] Provider[de-identified] PCP  Home Health Services Needed[de-identified] Strengthening/Theraputic Exercises to Improve Function  Homebound Criteria Met[de-identified] Requires the Assistance of Another Person for Safe Ambulation or to Leave the Home  Supporting Clincal Findings[de-identified] Fatigues Easliy in Short Distances    Treatment Team Recommendation: Home with [address] [employer]  Discharge Destination Plan[de-identified] Home with [address]      Pt agreeable to [address] recommendation  CM placed referral to [facility] to resume services for HHC/SN

## 2022-10-22 NOTE — PROGRESS NOTES
NEPHROLOGY PROGRESS NOTE   Jaden Babin  78 y o  male MRN: [id number]  Unit/Bed#: [facility] 627-01 Encounter: [id number]  Reason for Consult: MARANDA CKD    ASSESSMENT AND PLAN:  Patient is 82-year-old male with significant medical issues of uncontrolled diabetes, COPD, history of prostate cancer, status post radiation/seed treatment, CAD, status post PCI, diastolic CHF, was recently hospitalized with shortness of breath and now presents with shortness of breath again   We are consulted for MARANDA/CKD management      MARANDA POA on CKD stage III to stage IV, baseline creatinine 1 4 to 1 7 fluctuating going back to mid 2021  -since recent hospitalization creatinine seems to have plateaued 2 6 to 2 8  -creatinine 2 2 slowly improving today   -overall some progression of CKD suspected as well  -CKD suspect in the setting of hypertension, uncontrolled diabetes  -bladder scan nonsignificant  -diuretics as below  -recent UA this month shows no hematuria or proteinuria   -avoid nephrotoxins or NSAIDs, BMP in a m   -continue to avoid ARB for now     Diastolic CHF  -recent echo in May 2022 shows EF 60%, dilated IVC  -continue Lasix 40 mg p o  Daily  -remains on room air at the time of my encounter   -daily standing weight, accurate intake and output, weight fluctuating     Hyperkalemia, now resolved  -suspect secondary to uncontrolled hyperglycemia, potassium supplements, dietary component in the setting of advanced renal failure     -serum potassium stable  -monitor with Lasix  -BMP in a m   -strict low-potassium diet recommended  -needs better control of blood glucose     Anemia in CKD, blood loss anemia, GI bleed  -transfuse p r n  For hemoglobin less than seven, iron saturation 23%  -status post blood transfusion this admission  Status post EGD showing duodenal ulcer with stigmata of recent bleeding, treated with epi    -FOBT positive  -close GI follow-up      COPD with recent exacerbation, pulmonary following, status post steroids   Currently remains on room air      Severe azotemia, suspect in the setting of use of recent steroids, GI bleed, FOBT positive, underlying renal failure      Discussed above plan in detail with primary team    SUBJECTIVE:  Patient seen and examined at bedside  Denies chest pain, shortness of breath, nausea vomiting    Not happy with not been able to eat more    OBJECTIVE:  Current Weight: Weight - Scale: 103 kg (227 lb 14 4 oz)  Vitals:    10/22/22 0825   BP:    Pulse:    Resp:    Temp:    SpO2: 95%       Intake/Output Summary (Last 24 hours) at 10/22/2022 0949  Last data filed at 10/21/2022 2150  Gross per 24 hour   Intake 348 33 ml   Output --   Net 348 33 ml     Wt Readings from Last 3 Encounters:   10/21/22 103 kg (227 lb 14 4 oz)   10/15/22 98 7 kg (217 lb 11 1 oz)   09/17/22 101 kg (222 lb)     Temp Readings from Last 3 Encounters:   10/22/22 97 6 °F (36 4 °C)   10/15/22 97 5 °F (36 4 °C)   09/17/22 97 6 °F (36 4 °C) (Oral)     BP Readings from Last 3 Encounters:   10/22/22 135/57   10/15/22 94/66   09/17/22 110/54     Pulse Readings from Last 3 Encounters:   10/22/22 80   10/15/22 66   09/17/22 82        Physical Examination:  General:  Sitting in chair, no acute distress   Eyes:  Mild conjunctival pallor present  ENT:  External examination of ears and nose unremarkable  Neck:  No obvious lymphadenopathy appreciated  Respiratory:  Bilateral air entry present  CVS:  S1, S2 present  GI:  Soft, nondistended  CNS:  Active alert oriented x3  Skin:  No new rash  Musculoskeletal:  No obvious new gross deformity noted    Medications:    Current Facility-Administered Medications:   •  acetaminophen (TYLENOL) tablet 650 mg, 650 mg, Oral, Q6H PRN, Manuel Rodriguez MD  •  al mag oxide-diphenhydramine-lidocaine viscous (MAGIC MOUTHWASH) suspension 10 mL, 10 mL, Swish & Swallow, Q4H PRN, Leora Dominguez MD, 10 mL at 10/21/22 1331  •  albuterol inhalation solution 2 5 mg, 2 5 mg, Nebulization, Q4H PRN, Zacarias George MD  •  aspirin (ECOTRIN LOW STRENGTH) EC tablet 81 mg, 81 mg, Oral, Daily, Ari Avendaño MD, 81 mg at 10/21/22 0847  •  carvedilol (COREG) tablet 6 25 mg, 6 25 mg, Oral, BID With Meals, Zackery Turcios PA-C, 6 25 mg at 10/22/22 7549  •  cyclobenzaprine (FLEXERIL) tablet 10 mg, 10 mg, Oral, BID PRN, Ari Avendaño MD, 10 mg at 10/19/22 0915  •  EPINEPHrine (ADRENALIN) injection, , Intravenous, Code/Trauma/Sedation Med, Aaron Company, DO, 0 4 mg at 10/20/22 1428  •  ferrous sulfate tablet 325 mg, 325 mg, Oral, Daily With Breakfast, Ari Avendaño MD, 325 mg at 10/22/22 1597  •  fluticasone-vilanterol (BREO ELLIPTA) 100-25 mcg/inh inhaler 1 puff, 1 puff, Inhalation, Daily, Ari Avendaño MD, 1 puff at 10/22/22 3986  •  furosemide (LASIX) tablet 40 mg, 40 mg, Oral, Daily, Arin Newton MD, 40 mg at 10/22/22 2798  •  gabapentin (NEURONTIN) capsule 100 mg, 100 mg, Oral, TID, Ari Avendaño MD, 100 mg at 10/22/22 2304  •  guaiFENesin (MUCINEX) 12 hr tablet 600 mg, 600 mg, Oral, BID, Ari Avendaño MD, 600 mg at 10/22/22 0778  •  hydrALAZINE (APRESOLINE) injection 5 mg, 5 mg, Intravenous, Q6H PRN, Zackery Turcios PA-C  •  insulin aspart protamine-insulin aspart (NovoLOG 70/30) 100 units/mL subcutaneous injection 30 Units, 30 Units, Subcutaneous, Before Pancho Casper MD, 30 Units at 10/21/22 1803  •  insulin aspart protamine-insulin aspart (NovoLOG 70/30) 100 units/mL subcutaneous injection 30 Units, 30 Units, Subcutaneous, Daily Before Lunch, Oluwatomisin Chasity Lan MD  •  insulin aspart protamine-insulin aspart (NovoLOG 70/30) 100 units/mL subcutaneous injection 40 Units, 40 Units, Subcutaneous, Daily With Breakfast, Lolita Lan MD  •  insulin lispro (HumaLOG) 100 units/mL subcutaneous injection 1-5 Units, 1-5 Units, Subcutaneous, HS, Ari Avendaño MD, 2 Units at 10/21/22 2121  •  insulin lispro (HumaLOG) 100 units/mL subcutaneous injection 1-6 Units, 1-6 Units, Subcutaneous, TID AC, 4 Units at 10/21/22 1753 **AND** Fingerstick Glucose (POCT), , , TID AC, Sebas Schumacher MD  •  isosorbide mononitrate (IMDUR) 24 hr tablet 30 mg, 30 mg, Oral, Daily, Audrey Ampearl, DO, 30 mg at 10/22/22 0625  •  levalbuterol (XOPENEX) inhalation solution 1 25 mg, 1 25 mg, Nebulization, TID, 1 25 mg at 10/22/22 2099 **AND** sodium chloride 0 9 % inhalation solution 3 mL, 3 mL, Nebulization, TID, Cecilia Francisco MD, 3 mL at 10/22/22 8586  •  lidocaine (LIDODERM) 5 % patch 1 patch, 1 patch, Topical, Daily, Cecilia Francisco MD, 1 patch at 10/22/22 0818  •  melatonin tablet 3 mg, 3 mg, Oral, HS, YOLANDE Youssef, 3 mg at 10/21/22 2119  •  mupirocin (BACTROBAN) 2 % ointment, , Topical, BID, Heide Tamez MD, Given at 10/21/22 2307  •  nicotine (NICODERM CQ) 7 mg/24hr TD 24 hr patch 7 mg, 7 mg, Transdermal, Daily, Cecilia Francisco MD, 7 mg at 10/22/22 1105  •  pantoprazole (PROTONIX) injection 40 mg, 40 mg, Intravenous, Q12H [facility], Dawit G Chirayath, DO, 40 mg at 10/22/22 4278  •  pravastatin (PRAVACHOL) tablet 80 mg, 80 mg, Oral, Daily With Mikey Gomez MD, 80 mg at 10/21/22 1751  •  sodium chloride (OCEAN) 0 65 % nasal spray 1 spray, 1 spray, Each Nare, 4x Daily, Heide Tamez MD, 1 spray at 10/21/22 8519  •  tamsulosin (FLOMAX) capsule 0 4 mg, 0 4 mg, Oral, Daily With Dinner, Cecilia Francisco MD, 0 4 mg at 10/21/22 1752  •  umeclidinium bromide (INCRUSE ELLIPTA) 62 5 mcg/inh inhaler AEPB 1 puff, 1 puff, Inhalation, Daily, Cecilia Francisco MD, 1 puff at 10/22/22 8290    Laboratory Results:  Results from last 7 days   Lab Units 10/22/22  0908 10/22/22  9795 10/22/22  0032 10/21/22  1654 10/21/22  1954 10/21/22  0007 10/20/22  1632 10/20/22  1056 10/20/22  0431 10/19/22  2335 10/19/22  0714 10/19/22  0531 10/18/22  0847 10/18/22  0157 10/18/22  0148 10/17/22  1504 10/17/22  1003   WBC Thousand/uL  --  9 91  --   --  12 33*  --   --  9 86 7 75 8 95 10 26*  --   --  8 81  --   --  12 10*   HEMOGLOBIN g/dL 8 3* 7 4* 7 5* 6 2* 7 3* 7 2* 8 4* 6 6* 5 8* 6 4* 7 9*  --   --  7 9*  --   --  9 9*   HEMATOCRIT % 25 7* 22 6* 22 1* 19 1* 22 3* 21 4* 25 3* 20 8* 18 1* 20 6* 25 6*  --   --  24 9*  --   --  32 8*   PLATELETS Thousands/uL  --  88*  --   --  110*  --   --  117* 116* 148* 140*  --   --  117*  --   --  171   SODIUM mmol/L  --  140  --   --  140  --   --   --  140 136  --  139 136  --  135   < > 137   POTASSIUM mmol/L  --  3 9  --   --  4 7  --   --   --  4 3 5 3  --  4 6 5 5*  --  5 7*   < > 5 7*   CHLORIDE mmol/L  --  106  --   --  105  --   --   --  104 100  --  105 102  --  103   < > 105   CO2 mmol/L  --  29  --   --  29  --   --   --  30 30  --  26 25  --  29   < > 25   BUN mg/dL  --  72*  --   --  91*  --   --   --  110* 106*  --  100* 86*  --  90*   < > 78*   CREATININE mg/dL  --  2 20*  --   --  2 44*  --   --   --  2 64* 2 74*  --  2 72* 2 78*  --  2 68*   < > 2 61*   CALCIUM mg/dL  --  7 9*  --   --  7 9*  --   --   --  7 6* 7 7*  --  8 0* 8 5  --  8 4   < > 8 5   MAGNESIUM mg/dL  --   --   --   --   --   --   --   --   --   --   --   --   --   --   --   --  2 6   PHOSPHORUS mg/dL  --   --   --   --   --   --   --   --   --   --   --   --   --   --   --   --  5 2*    < > = values in this interval not displayed  XR chest 1 view portable   Final Result by Carleen Cronin MD (10/17 0845)      Stable appearance of volume loss and chronic opacities in the right                  Workstation performed: [id number]             Portions of the record may have been created with voice recognition software  Occasional wrong word or "sound a like" substitutions may have occurred due to the inherent limitations of voice recognition software  Read the chart carefully and recognize, using context, where substitutions have occurred

## 2022-10-22 NOTE — ASSESSMENT & PLAN NOTE
Lab Results   Component Value Date    HGBA1C 9 7 (H) 10/10/2022       Recent Labs     10/21/22  1647 10/21/22  2035 10/22/22  0751 10/22/22  0822   POCGLU 274* 243* 39* 102       · Recently discharged on new dosing regimen of Humalog 75/25 - 50 units, 40 units, 30 units with breakfast lunch and dinner respectively  Reported labile blood glucose at home, as high as 300s as low as 20s  · Episode of hypoglycemia this morning  Endocrinology following, appreciate recommendations regarding dosing adjustments    · Carb controlled diet   · Hypoglycemia protocol

## 2022-10-23 PROBLEM — D62 ABLA (ACUTE BLOOD LOSS ANEMIA): Status: ACTIVE | Noted: 2022-10-18

## 2022-10-23 PROBLEM — J96.22 ACUTE ON CHRONIC RESPIRATORY FAILURE WITH HYPOXIA AND HYPERCAPNIA (HCC): Status: RESOLVED | Noted: 2021-04-15 | Resolved: 2022-10-23

## 2022-10-23 PROBLEM — K27.9 PUD (PEPTIC ULCER DISEASE): Status: ACTIVE | Noted: 2022-10-20

## 2022-10-23 PROBLEM — J96.21 ACUTE ON CHRONIC RESPIRATORY FAILURE WITH HYPOXIA AND HYPERCAPNIA (HCC): Status: RESOLVED | Noted: 2021-04-15 | Resolved: 2022-10-23

## 2022-10-23 LAB
ANION GAP SERPL CALCULATED.3IONS-SCNC: 5 MMOL/L (ref 4–13)
BASOPHILS # BLD AUTO: 0.01 THOUSANDS/ÂΜL (ref 0–0.1)
BASOPHILS NFR BLD AUTO: 0 % (ref 0–1)
BUN SERPL-MCNC: 67 MG/DL (ref 5–25)
CALCIUM SERPL-MCNC: 7.8 MG/DL (ref 8.3–10.1)
CHLORIDE SERPL-SCNC: 104 MMOL/L (ref 96–108)
CO2 SERPL-SCNC: 30 MMOL/L (ref 21–32)
CREAT SERPL-MCNC: 2.39 MG/DL (ref 0.6–1.3)
EOSINOPHIL # BLD AUTO: 0.14 THOUSAND/ÂΜL (ref 0–0.61)
EOSINOPHIL NFR BLD AUTO: 2 % (ref 0–6)
ERYTHROCYTE [DISTWIDTH] IN BLOOD BY AUTOMATED COUNT: 16.4 % (ref 11.6–15.1)
GFR SERPL CREATININE-BSD FRML MDRD: 25 ML/MIN/1.73SQ M
GLUCOSE SERPL-MCNC: 117 MG/DL (ref 65–140)
GLUCOSE SERPL-MCNC: 171 MG/DL (ref 65–140)
GLUCOSE SERPL-MCNC: 179 MG/DL (ref 65–140)
GLUCOSE SERPL-MCNC: 237 MG/DL (ref 65–140)
GLUCOSE SERPL-MCNC: 284 MG/DL (ref 65–140)
GLUCOSE SERPL-MCNC: 61 MG/DL (ref 65–140)
HCT VFR BLD AUTO: 24.4 % (ref 36.5–49.3)
HGB BLD-MCNC: 7.8 G/DL (ref 12–17)
IMM GRANULOCYTES # BLD AUTO: 0.06 THOUSAND/UL (ref 0–0.2)
IMM GRANULOCYTES NFR BLD AUTO: 1 % (ref 0–2)
LYMPHOCYTES # BLD AUTO: 0.76 THOUSANDS/ÂΜL (ref 0.6–4.47)
LYMPHOCYTES NFR BLD AUTO: 9 % (ref 14–44)
MCH RBC QN AUTO: 28.7 PG (ref 26.8–34.3)
MCHC RBC AUTO-ENTMCNC: 32 G/DL (ref 31.4–37.4)
MCV RBC AUTO: 90 FL (ref 82–98)
MONOCYTES # BLD AUTO: 0.84 THOUSAND/ÂΜL (ref 0.17–1.22)
MONOCYTES NFR BLD AUTO: 10 % (ref 4–12)
NEUTROPHILS # BLD AUTO: 7.06 THOUSANDS/ÂΜL (ref 1.85–7.62)
NEUTS SEG NFR BLD AUTO: 78 % (ref 43–75)
NRBC BLD AUTO-RTO: 0 /100 WBCS
PLATELET # BLD AUTO: 98 THOUSANDS/UL (ref 149–390)
PMV BLD AUTO: 10.6 FL (ref 8.9–12.7)
POTASSIUM SERPL-SCNC: 3.9 MMOL/L (ref 3.5–5.3)
RBC # BLD AUTO: 2.72 MILLION/UL (ref 3.88–5.62)
SODIUM SERPL-SCNC: 139 MMOL/L (ref 135–147)
WBC # BLD AUTO: 8.87 THOUSAND/UL (ref 4.31–10.16)

## 2022-10-23 PROCEDURE — 99232 SBSQ HOSP IP/OBS MODERATE 35: CPT | Performed by: INTERNAL MEDICINE

## 2022-10-23 PROCEDURE — 85025 COMPLETE CBC W/AUTO DIFF WBC: CPT | Performed by: NURSE PRACTITIONER

## 2022-10-23 PROCEDURE — 94760 N-INVAS EAR/PLS OXIMETRY 1: CPT

## 2022-10-23 PROCEDURE — 80048 BASIC METABOLIC PNL TOTAL CA: CPT | Performed by: INTERNAL MEDICINE

## 2022-10-23 PROCEDURE — 99233 SBSQ HOSP IP/OBS HIGH 50: CPT | Performed by: PHYSICIAN ASSISTANT

## 2022-10-23 PROCEDURE — 94640 AIRWAY INHALATION TREATMENT: CPT

## 2022-10-23 PROCEDURE — 99239 HOSP IP/OBS DSCHRG MGMT >30: CPT | Performed by: STUDENT IN AN ORGANIZED HEALTH CARE EDUCATION/TRAINING PROGRAM

## 2022-10-23 PROCEDURE — 82948 REAGENT STRIP/BLOOD GLUCOSE: CPT

## 2022-10-23 RX ORDER — INSULIN LISPRO PROTAMIN/LISPRO 75-25/ML
VIAL (ML) SUBCUTANEOUS
Qty: 40.5 ML | Refills: 0 | Status: CANCELLED | OUTPATIENT
Start: 2022-10-23

## 2022-10-23 RX ORDER — INSULIN LISPRO 100 [IU]/ML
15 INJECTION, SOLUTION INTRAVENOUS; SUBCUTANEOUS
Status: DISCONTINUED | OUTPATIENT
Start: 2022-10-23 | End: 2022-10-24 | Stop reason: HOSPADM

## 2022-10-23 RX ORDER — CARVEDILOL 6.25 MG/1
6.25 TABLET ORAL 2 TIMES DAILY WITH MEALS
Qty: 60 TABLET | Refills: 0 | Status: CANCELLED | OUTPATIENT
Start: 2022-10-23 | End: 2022-11-22

## 2022-10-23 RX ORDER — INSULIN GLARGINE 100 [IU]/ML
40 INJECTION, SOLUTION SUBCUTANEOUS
Status: DISCONTINUED | OUTPATIENT
Start: 2022-10-23 | End: 2022-10-24

## 2022-10-23 RX ADMIN — CARVEDILOL 6.25 MG: 6.25 TABLET, FILM COATED ORAL at 17:18

## 2022-10-23 RX ADMIN — PANTOPRAZOLE SODIUM 40 MG: 40 TABLET, DELAYED RELEASE ORAL at 05:01

## 2022-10-23 RX ADMIN — PANTOPRAZOLE SODIUM 40 MG: 40 TABLET, DELAYED RELEASE ORAL at 17:19

## 2022-10-23 RX ADMIN — LEVALBUTEROL HYDROCHLORIDE 1.25 MG: 1.25 SOLUTION, CONCENTRATE RESPIRATORY (INHALATION) at 13:25

## 2022-10-23 RX ADMIN — ACETAMINOPHEN 650 MG: 325 TABLET ORAL at 05:22

## 2022-10-23 RX ADMIN — ASPIRIN 81 MG: 81 TABLET, COATED ORAL at 08:18

## 2022-10-23 RX ADMIN — INSULIN LISPRO 1 UNITS: 100 INJECTION, SOLUTION INTRAVENOUS; SUBCUTANEOUS at 12:00

## 2022-10-23 RX ADMIN — PRAVASTATIN SODIUM 80 MG: 80 TABLET ORAL at 17:18

## 2022-10-23 RX ADMIN — SALINE NASAL SPRAY 1 SPRAY: 1.5 SOLUTION NASAL at 21:05

## 2022-10-23 RX ADMIN — GABAPENTIN 100 MG: 100 CAPSULE ORAL at 17:18

## 2022-10-23 RX ADMIN — INSULIN LISPRO 1 UNITS: 100 INJECTION, SOLUTION INTRAVENOUS; SUBCUTANEOUS at 17:19

## 2022-10-23 RX ADMIN — LIDOCAINE 5% 1 PATCH: 700 PATCH TOPICAL at 08:21

## 2022-10-23 RX ADMIN — NICOTINE 7 MG: 7 PATCH, EXTENDED RELEASE TRANSDERMAL at 08:27

## 2022-10-23 RX ADMIN — ISOSORBIDE MONONITRATE 30 MG: 30 TABLET, EXTENDED RELEASE ORAL at 08:19

## 2022-10-23 RX ADMIN — GABAPENTIN 100 MG: 100 CAPSULE ORAL at 21:05

## 2022-10-23 RX ADMIN — GABAPENTIN 100 MG: 100 CAPSULE ORAL at 08:19

## 2022-10-23 RX ADMIN — INSULIN ASPART 30 UNITS: 100 INJECTION, SUSPENSION SUBCUTANEOUS at 12:00

## 2022-10-23 RX ADMIN — CARVEDILOL 6.25 MG: 6.25 TABLET, FILM COATED ORAL at 08:18

## 2022-10-23 RX ADMIN — GUAIFENESIN 600 MG: 600 TABLET, EXTENDED RELEASE ORAL at 17:18

## 2022-10-23 RX ADMIN — Medication 3 MG: at 21:05

## 2022-10-23 RX ADMIN — TAMSULOSIN HYDROCHLORIDE 0.4 MG: 0.4 CAPSULE ORAL at 17:18

## 2022-10-23 RX ADMIN — INSULIN LISPRO 15 UNITS: 100 INJECTION, SOLUTION INTRAVENOUS; SUBCUTANEOUS at 17:19

## 2022-10-23 RX ADMIN — INSULIN LISPRO 15 UNITS: 100 INJECTION, SOLUTION INTRAVENOUS; SUBCUTANEOUS at 12:20

## 2022-10-23 RX ADMIN — LEVALBUTEROL HYDROCHLORIDE 1.25 MG: 1.25 SOLUTION, CONCENTRATE RESPIRATORY (INHALATION) at 08:03

## 2022-10-23 RX ADMIN — GUAIFENESIN 600 MG: 600 TABLET, EXTENDED RELEASE ORAL at 08:18

## 2022-10-23 RX ADMIN — FUROSEMIDE 40 MG: 40 TABLET ORAL at 08:18

## 2022-10-23 RX ADMIN — FLUTICASONE FUROATE AND VILANTEROL TRIFENATATE 1 PUFF: 100; 25 POWDER RESPIRATORY (INHALATION) at 08:27

## 2022-10-23 RX ADMIN — ISODIUM CHLORIDE 3 ML: 0.03 SOLUTION RESPIRATORY (INHALATION) at 13:25

## 2022-10-23 RX ADMIN — APIXABAN 5 MG: 5 TABLET, FILM COATED ORAL at 17:18

## 2022-10-23 RX ADMIN — FERROUS SULFATE TAB 325 MG (65 MG ELEMENTAL FE) 325 MG: 325 (65 FE) TAB at 08:19

## 2022-10-23 RX ADMIN — INSULIN ASPART 40 UNITS: 100 INJECTION, SUSPENSION SUBCUTANEOUS at 08:18

## 2022-10-23 RX ADMIN — INSULIN GLARGINE 40 UNITS: 100 INJECTION, SOLUTION SUBCUTANEOUS at 21:10

## 2022-10-23 RX ADMIN — APIXABAN 5 MG: 5 TABLET, FILM COATED ORAL at 08:18

## 2022-10-23 RX ADMIN — ISODIUM CHLORIDE 3 ML: 0.03 SOLUTION RESPIRATORY (INHALATION) at 08:04

## 2022-10-23 NOTE — PROGRESS NOTES
Progress Note -  [facility]  Jose A Mehta Sr  78 y o  male MRN: [id number]  Unit/Bed#: [facility] 627-01 Encounter: [id number]      ASSESSMENT AND PLAN:      79-year-old male with past medical history of COPD on 2 L, AFib on Eliquis, iron deficiency anemia, CKD 4, CAD, diabetes who is admitted for melena, anemia  GI is consulted for further management  1  Acute blood loss anemia/melena  Underwent EGD on 10/20/2022 showed ulcer in duodenal sweep treated with epi, APC, hemo spray  Hemoglobin initially 5 8 which is now stable around 7 8 after 2 units transfusion  No further signs of overt GI bleeding  · Okay to continue Eliquis at this time  · Advance diet as tolerated  · Continue iron supplementation  · Monitor hemoglobin, transfuse for less than 7   · Monitor for signs overt bleeding  · Gastroenterology to sign off at this time  Please contact with any questions  2  Atrial fibrillation   Anticoagulated on Eliquis  · Okay to continue Eliquis at this time      Rest of care per primary team     ______________________________________________________________________    Subjective:  Seen examined  Denies any abdominal pain, nausea, vomiting, hematemesis  Describes stools as dark but not black  Tolerating diet  REVIEW OF SYSTEMS:    Review of Systems   Constitutional: Negative for chills and fever  HENT: Negative for congestion and sinus pressure  Respiratory: Negative for cough and shortness of breath  Cardiovascular: Negative for chest pain, palpitations and leg swelling  Gastrointestinal: Negative for abdominal pain, diarrhea, nausea and vomiting  Genitourinary: Negative for dysuria and hematuria  Musculoskeletal: Negative for arthralgias and back pain  Skin: Negative for color change and rash  Neurological: Negative for dizziness and headaches  Psychiatric/Behavioral: Negative for agitation and confusion  All other systems reviewed and are negative  Historical Information   Past Medical History:   Diagnosis Date   • Abdominal pain    • MARANDA (acute kidney injury) ([facility] ) 2018   • Cardiac disease    • CHF (congestive heart failure) (HCC)    • COPD, severe (HCC)    • Coronary artery disease    • DDD (degenerative disc disease), lumbar 2020   • Diabetes mellitus ([facility] )    • Dyspnea    • GERD (gastroesophageal reflux disease)    • Hyperlipidemia    • Hypertension    • MI (myocardial infarction) ([place] [address] )     with 3 stents   • Nodule of apex of right lung    • JACQUELINE (obstructive sleep apnea)    • Prostate cancer [facility])      Past Surgical History:   Procedure Laterality Date   • ABDOMINAL SURGERY      exploratory   • ANGIOPLASTY      3 stents   • APPENDECTOMY     • COLONOSCOPY     • CT NEEDLE BIOPSY LUNG  11/3/2020   • ESOPHAGOGASTRODUODENOSCOPY N/A 10/2/2017    Procedure: ESOPHAGOGASTRODUODENOSCOPY (EGD); Surgeon: Tunde Shields MD;  Location: [facility];   Service: Gastroenterology   • IR THORACENTESIS  11/3/2020   • KNEE CARTILAGE SURGERY     • OTHER SURGICAL HISTORY      stent placement   • PROSTATE SURGERY     • SKIN GRAFT      Basal cell CA back     Social History   Social History     Substance and Sexual Activity   Alcohol Use Never     Social History     Substance and Sexual Activity   Drug Use No     Social History     Tobacco Use   Smoking Status Former Smoker   • Packs/day: 1 50   • Years: 50 00   • Pack years: 75 00   • Start date: 38   • Quit date: 2020   • Years since quittin 2   Smokeless Tobacco Never Used     Family History   Problem Relation Age of Onset   • Heart disease Father    • Other Father         Mesothelioma        Meds/Allergies     Medications Prior to Admission   Medication   • apixaban (ELIQUIS) 5 mg   • aspirin (ECOTRIN LOW STRENGTH) 81 mg EC tablet   • carvedilol (COREG) 6 25 mg tablet   • cyclobenzaprine (FLEXERIL) 10 mg tablet   • ferrous sulfate 325 (65 Fe) mg tablet   • fluticasone-umeclidinium-vilanterol (Trelegy Ellipta) 100-62 5-25 MCG/INH inhaler   • furosemide (LASIX) 40 mg tablet   • gabapentin (NEURONTIN) 100 mg capsule   • glucose blood test strip   • insulin lispro protamine-insulin lispro (HumaLOG Mix 75/25) 100 units/mL   • lidocaine (Lidoderm) 5 %   • pantoprazole (PROTONIX) 40 mg tablet   • potassium chloride (K-DUR,KLOR-CON) 20 mEq tablet   • simvastatin (ZOCOR) 40 mg tablet   • tamsulosin (FLOMAX) 0 4 mg   • umeclidinium bromide (INCRUSE ELLIPTA) 62 5 mcg/inh AEPB inhaler     Current Facility-Administered Medications   Medication Dose Route Frequency   • acetaminophen (TYLENOL) tablet 650 mg  650 mg Oral Q6H PRN   • al mag oxide-diphenhydramine-lidocaine viscous (MAGIC MOUTHWASH) suspension 10 mL  10 mL Swish & Swallow Q4H PRN   • albuterol inhalation solution 2 5 mg  2 5 mg Nebulization Q4H PRN   • apixaban (ELIQUIS) tablet 5 mg  5 mg Oral BID   • aspirin (ECOTRIN LOW STRENGTH) EC tablet 81 mg  81 mg Oral Daily   • carvedilol (COREG) tablet 6 25 mg  6 25 mg Oral BID With Meals   • cyclobenzaprine (FLEXERIL) tablet 10 mg  10 mg Oral BID PRN   • EPINEPHrine (ADRENALIN) injection   Intravenous Code/Trauma/Sedation Med   • ferrous sulfate tablet 325 mg  325 mg Oral Daily With Breakfast   • fluticasone-vilanterol (BREO ELLIPTA) 100-25 mcg/inh inhaler 1 puff  1 puff Inhalation Daily   • furosemide (LASIX) tablet 40 mg  40 mg Oral Daily   • gabapentin (NEURONTIN) capsule 100 mg  100 mg Oral TID   • guaiFENesin (MUCINEX) 12 hr tablet 600 mg  600 mg Oral BID   • hydrALAZINE (APRESOLINE) injection 5 mg  5 mg Intravenous Q6H PRN   • insulin aspart protamine-insulin aspart (NovoLOG 70/30) 100 units/mL subcutaneous injection 25 Units  25 Units Subcutaneous Before Dinner   • insulin aspart protamine-insulin aspart (NovoLOG 70/30) 100 units/mL subcutaneous injection 30 Units  30 Units Subcutaneous Daily Before Lunch   • insulin aspart protamine-insulin aspart (NovoLOG 70/30) 100 units/mL subcutaneous injection 40 Units  40 Units Subcutaneous Daily With Breakfast   • insulin lispro (HumaLOG) 100 units/mL subcutaneous injection 1-5 Units  1-5 Units Subcutaneous HS   • insulin lispro (HumaLOG) 100 units/mL subcutaneous injection 1-6 Units  1-6 Units Subcutaneous TID AC   • isosorbide mononitrate (IMDUR) 24 hr tablet 30 mg  30 mg Oral Daily   • levalbuterol (XOPENEX) inhalation solution 1 25 mg  1 25 mg Nebulization TID    And   • sodium chloride 0 9 % inhalation solution 3 mL  3 mL Nebulization TID   • lidocaine (LIDODERM) 5 % patch 1 patch  1 patch Topical Daily   • melatonin tablet 3 mg  3 mg Oral HS   • mupirocin (BACTROBAN) 2 % ointment   Topical BID   • nicotine (NICODERM CQ) 7 mg/24hr TD 24 hr patch 7 mg  7 mg Transdermal Daily   • pantoprazole (PROTONIX) EC tablet 40 mg  40 mg Oral BID AC   • pravastatin (PRAVACHOL) tablet 80 mg  80 mg Oral Daily With Dinner   • sodium chloride (OCEAN) 0 65 % nasal spray 1 spray  1 spray Each Nare 4x Daily   • tamsulosin (FLOMAX) capsule 0 4 mg  0 4 mg Oral Daily With Dinner   • umeclidinium bromide (INCRUSE ELLIPTA) 62 5 mcg/inh inhaler AEPB 1 puff  1 puff Inhalation Daily       Allergies   Allergen Reactions   • Crestor [Rosuvastatin] Other (See Comments)     Unknown     • Lisinopril GI Intolerance, Dizziness and Abdominal Pain   • Metformin GI Intolerance           Objective     Blood pressure (!) 119/49, pulse 77, temperature 98 2 °F (36 8 °C), resp  rate 16, height 6' (1 829 m), weight 95 8 kg (211 lb 3 2 oz), SpO2 97 %  Body mass index is 28 64 kg/m²  Intake/Output Summary (Last 24 hours) at 10/23/2022 0650  Last data filed at 10/23/2022 0535  Gross per 24 hour   Intake 50 ml   Output 850 ml   Net -800 ml         PHYSICAL EXAM:      Physical Exam  Vitals and nursing note reviewed  Constitutional:       General: He is not in acute distress  Appearance: Normal appearance  He is well-developed  He is obese  He is not ill-appearing  HENT:      Head: Normocephalic and atraumatic  Mouth/Throat:      Mouth: Mucous membranes are moist    Eyes:      Extraocular Movements: Extraocular movements intact  Conjunctiva/sclera: Conjunctivae normal       Pupils: Pupils are equal, round, and reactive to light  Cardiovascular:      Rate and Rhythm: Normal rate and regular rhythm  Pulses: Normal pulses  Heart sounds: Normal heart sounds  No murmur heard  No friction rub  No gallop  Pulmonary:      Effort: Pulmonary effort is normal  No respiratory distress  Breath sounds: Normal breath sounds  No wheezing or rales  Abdominal:      General: Abdomen is flat  Bowel sounds are normal  There is no distension  Palpations: Abdomen is soft  Tenderness: There is no abdominal tenderness  There is no guarding  Musculoskeletal:      Cervical back: Neck supple  Right lower leg: No edema  Left lower leg: No edema  Skin:     General: Skin is warm and dry  Neurological:      General: No focal deficit present  Mental Status: He is alert and oriented to person, place, and time  Psychiatric:         Mood and Affect: Mood normal          Behavior: Behavior normal           Lab Results:   No results displayed because visit has over 200 results  Imaging Studies: I have personally reviewed pertinent imaging studies  [address] [facility]    Gastroenterology Fellow  [id number]  Available via [employer]  10/23/2022 6:50 AM

## 2022-10-23 NOTE — ASSESSMENT & PLAN NOTE
· EGD 10/21 showing bleeding ulcer  · PPI therapy  · No need for re-scope at this time; GI signed off

## 2022-10-23 NOTE — ASSESSMENT & PLAN NOTE
He is s/p 2u pRBC and EGD  Findings show Duodenal sweep ulcer with stigmata of recent bleeding  Treated with epi, APC, and eventual hemospray        · Deemed stable to restart Eliquis  · Iron supplementation    Recent Labs     10/22/22  1808 10/22/22  0908 10/22/22  1802 10/23/22  0457 10/24/22  0559   HGB 7 4* 8 3* 7 9* 7 8* 7 3*   MCV 88  --   --  90 92   RDW 16 1*  --   --  16 4* 16 6*

## 2022-10-23 NOTE — ASSESSMENT & PLAN NOTE
· NM stress test in May 2022 unremarkable   · Troponin trend negative and EKG without ischemic changes   · Continue aspirin, statin, beta-blocker   · Cardiology input appreciated, do not recommend any additional ischemic work up at this time, added Imdur  They signed off

## 2022-10-23 NOTE — PROGRESS NOTES
[address] [facility]  Progress Note - Liborio Mejía Sr  1945, 78 y o  male MRN: [id number]  Unit/Bed#: [facility] 627-01 Encounter: [id number]  Primary Care Provider: Mirian Potts MD   Date and time admitted to hospital: 10/16/2022  8:22 PM    * Acute on chronic respiratory failure with hypoxia and hypercapnia (HCC)-resolved as of 10/23/2022  Assessment & Plan  Presenting with complaints of SOB  At baseline uses 2 L NC as needed however was acutely hypoxic on admission requiring 3-4 L NC   · VBG on admission: pH 7 23, CO2 69, O2 52, HCO3 28  · Currently, on room air with SpO2 mid-low 90s at rest   · Monitor and titrate supplemental oxygen as needed   · Pulmonology input appreciated, do not suspect recurrent COPD exacerbation  Completed short course of PO Prednisone on 10/19  · Respiratory protocol     Type 2 diabetes mellitus with hyperglycemia, with long-term current use of insulin [facility])  Assessment & Plan  Lab Results   Component Value Date    HGBA1C 9 7 (H) 10/10/2022       Recent Labs     10/22/22  1157 10/22/22  1652 10/22/22  2042 10/23/22  0741   POCGLU 440* 381* 368* 117       · Recently discharged on new dosing regimen of Humalog 75/25 - 50 units, 40 units, 30 units with breakfast lunch and dinner respectively  Reported labile blood glucose at home, as high as 300s as low as 20s  · Endocrinology following, appreciate recommendations; start Lantus 40 units today at bedtime and lispro 15 units tid with meals + correctional   · Patient will not be able to afford this on dc however Endo is titrating using this method and then will convert over to 70/30 prior to dc to make it more affordable  · Carb controlled diet   · Hypoglycemia protocol     GI bleeding  Assessment & Plan  · EGD 10/21 showing bleeding ulcer  · PPI therapy  · No need for re-scope at this time; GI signed off  Anemia  Assessment & Plan  Noted, appears baseline Hgb has been around 12    Hgb has been slowly trending down  · From GIB see above  · Hgb stable since resuming Eliquis on 10/21  · Iron supplementation    A-fib ([facility] [address] )  Assessment & Plan  · Resume Eliquis today  · Continue home dose Coreg     Stage 3b chronic kidney disease (CKD) [facility])  Assessment & Plan  Lab Results   Component Value Date    EGFR 28 10/22/2022    EGFR 24 10/21/2022    EGFR 22 10/20/2022    CREATININE 2 20 (H) 10/22/2022    CREATININE 2 44 (H) 10/21/2022    CREATININE 2 64 (H) 10/20/2022     Evaluated by nephrology on recent admission, recent baseline has been in the low 2s and was discharged with creatinine of 2 6  · Creatinine is currently stable in mid-2s at this time  · Continue home dose diuretics   · Avoid nephrotoxins and hypotension   · Will need outpatient nephrology follow up  · Monitor BMP     CAD (coronary artery disease)  Assessment & Plan  · NM stress test in May 2022 unremarkable   · Troponin trend negative and EKG without ischemic changes   · Continue aspirin, statin, beta-blocker   · Cardiology input appreciated, do not recommend any additional ischemic work up at this time, added Imdur  They signed off  Chronic diastolic (congestive) heart failure (HCC)  Assessment & Plan  Wt Readings from Last 3 Encounters:   10/23/22 95 8 kg (211 lb 3 2 oz)   10/15/22 98 7 kg (217 lb 11 1 oz)   09/17/22 101 kg (222 lb)     · Euvolemic   · Most recent echo with LVEF 60%  · Continue home dose Coreg and Lasix    Essential hypertension  Assessment & Plan  · Continue home dose Coreg and Lasix  · Monitor routinely     HLD (hyperlipidemia)  Assessment & Plan  · On pravastatin 80 mg daily    COPD (chronic obstructive pulmonary disease) ([facility] [address] )  Assessment & Plan  Recent admission for COPD exacerbation, discharge on 10/15 with 5 days prednisone  Re-presents due to acute onset shortness of breath  On admission found to by hypoxic with respiratory acidosis    · Completed prednisone on 10/19  · Continue with Breo/Incruse 100/25/62 5 daily  · Guaifenesin for expectoration 600mg twice daily  · Xopenex round-the-clock and p r n  Dylan Sierra · Pulm recs appreciated; they signed off  VTE Pharmacologic Prophylaxis: VTE Score: 7 High Risk (Score >/= 5) - Pharmacological DVT Prophylaxis Ordered: apixaban (Eliquis)  Sequential Compression Devices Ordered  Patient Centered Rounds: I performed bedside rounds with nursing staff today  Discussions with Specialists or Other Care Team Provider: wes robbins    Education and Discussions with Family / Patient: Patient declined call to   Time Spent for Care: 30 minutes  More than 50% of total time spent on counseling and coordination of care as described above  Current Length of Stay: 7 day(s)  Current Patient Status: Inpatient   Certification Statement: The patient will continue to require additional inpatient hospital stay due to titrating insulin per endo  Discharge Plan: Anticipate discharge in 24-48 hrs to home  Code Status: Level 1 - Full Code    Subjective:   No acute complaints     No nausea or vomiting or abd pain  No BM yet  No hematochezia or BRBPR or melena  States he has dizziness when bending down which has been happening to him for months no, it is not worsening, denies dizziness at this time as well as lightheadedness, presyncope, palpitations chest pain or shortness of breath      Patient denies headache, vision changes, confusion      Nurse reports no acute events overnight  Objective:     Vitals:   Temp (24hrs), Av 2 °F (36 8 °C), Min:98 °F (36 7 °C), Max:98 5 °F (36 9 °C)    Temp:  [98 °F (36 7 °C)-98 5 °F (36 9 °C)] 98 °F (36 7 °C)  HR:  [70-77] 70  Resp:  [16-18] 18  BP: (110-119)/(49-52) 110/52  SpO2:  [87 %-100 %] 96 %  Body mass index is 28 64 kg/m²  Input and Output Summary (last 24 hours):      Intake/Output Summary (Last 24 hours) at 10/23/2022 1344  Last data filed at 10/23/2022 0900  Gross per 24 hour   Intake 290 ml   Output 1375 ml   Net -1085 ml Physical Exam:   Physical Exam  Vitals and nursing note reviewed  Constitutional:       Comments: Chronically ill appearing, he is awake and alert  HENT:      Head: Normocephalic and atraumatic  Nose: Nose normal       Mouth/Throat:      Mouth: Mucous membranes are moist    Eyes:      Extraocular Movements: Extraocular movements intact  Conjunctiva/sclera: Conjunctivae normal    Cardiovascular:      Rate and Rhythm: Normal rate and regular rhythm  Pulses: Normal pulses  Pulmonary:      Effort: Pulmonary effort is normal       Breath sounds: Normal breath sounds  Comments: No wheezing or rales, not using O2 at this time he is not having increased work of breathing  Abdominal:      General: Bowel sounds are normal  There is no distension  Palpations: Abdomen is soft  Tenderness: There is no abdominal tenderness  There is no guarding  Comments: Central obesity   Musculoskeletal:         General: No swelling or tenderness  Cervical back: Normal range of motion  Skin:     General: Skin is warm and dry  Coloration: Skin is pale  Findings: Bruising present  Comments: Abrasions on the skin from skin picking   Neurological:      General: No focal deficit present  Mental Status: He is alert and oriented to person, place, and time  Psychiatric:         Mood and Affect: Mood normal          Behavior: Behavior normal       Comments: Flat affect     Additional Data:     Labs:  Results from last 7 days   Lab Units 10/23/22  0457 10/17/22  1003 10/16/22  2032   WBC Thousand/uL 8 87   < > 14 13*   HEMOGLOBIN g/dL 7 8*   < > 10 2*   HEMATOCRIT % 24 4*   < > 32 3*   PLATELETS Thousands/uL 98*   < > 183   BANDS PCT %  --   --  1   NEUTROS PCT % 78*   < >  --    LYMPHS PCT % 9*   < >  --    LYMPHO PCT %  --   --  17   MONOS PCT % 10   < >  --    MONO PCT %  --   --  6   EOS PCT % 2   < > 5    < > = values in this interval not displayed       Results from last 7 days   Lab Units 10/23/22  0457 10/20/22  0431 10/19/22  2335   SODIUM mmol/L 139   < > 136   POTASSIUM mmol/L 3 9   < > 5 3   CHLORIDE mmol/L 104   < > 100   CO2 mmol/L 30   < > 30   BUN mg/dL 67*   < > 106*   CREATININE mg/dL 2 39*   < > 2 74*   ANION GAP mmol/L 5   < > 6   CALCIUM mg/dL 7 8*   < > 7 7*   ALBUMIN g/dL  --   --  2 9*   TOTAL BILIRUBIN mg/dL  --   --  0 26   ALK PHOS U/L  --   --  47   ALT U/L  --   --  53   AST U/L  --   --  47*   GLUCOSE RANDOM mg/dL 61*   < > 329*    < > = values in this interval not displayed  Results from last 7 days   Lab Units 10/23/22  1155 10/23/22  0741 10/22/22  2042 10/22/22  1652 10/22/22  1157 10/22/22  0822 10/22/22  0751 10/21/22  2035 10/21/22  1647 10/21/22  1515 10/21/22  1109 10/21/22  0809   POC GLUCOSE mg/dl 171* 117 368* 381* 440* 102 39* 243* 274* 392* 311* 167*               Lines/Drains:  Invasive Devices  Report    Peripheral Intravenous Line  Duration           Peripheral IV 10/20/22 Right Antecubital 3 days                      Imaging: No pertinent imaging reviewed      Recent Cultures (last 7 days): none        Last 24 Hours Medication List:   Current Facility-Administered Medications   Medication Dose Route Frequency Provider Last Rate   • acetaminophen  650 mg Oral Q6H PRN Hoang Jacob MD     • al mag oxide-diphenhydramine-lidocaine viscous  10 mL Swish & Swallow Q4H PRN Mauri Fuller MD     • albuterol  2 5 mg Nebulization Q4H PRN Emilia Rangel MD     • apixaban  5 mg Oral BID YOLANDE Fiore     • aspirin  81 mg Oral Daily Hoang Jacob MD     • carvedilol  6 25 mg Oral BID With Meals Kaela Molina PA-C     • cyclobenzaprine  10 mg Oral BID PRN Hoang Jacob MD     • EPINEPHrine   Intravenous Code/Trauma/Sedation [facility], DO     • ferrous sulfate  325 mg Oral Daily With Breakfast Hoang Jacob MD     • fluticasone-vilanterol  1 puff Inhalation Daily Hoang Jacob MD • furosemide  40 mg Oral Daily Kassi Toth MD     • gabapentin  100 mg Oral TID Carmen Tamez MD     • guaiFENesin  600 mg Oral BID Carmen Tamez MD     • hydrALAZINE  5 mg Intravenous Q6H PRN Anna Todd PA-C     • insulin glargine  40 Units Subcutaneous HS Delmy Carrero MD     • insulin lispro  1-6 Units Subcutaneous TID AC Carmen Tamez MD     • insulin lispro  15 Units Subcutaneous TID With Meals Delmy Carrero MD     • isosorbide mononitrate  30 mg Oral Daily Merlin Valdez DO     • levalbuterol  1 25 mg Nebulization TID Carmen Tamez MD      And   • sodium chloride  3 mL Nebulization TID Carmen Tamez MD     • lidocaine  1 patch Topical Daily Carmen Tamez MD     • melatonin  3 mg Oral HS YOLANDE Gómez     • mupirocin   Topical BID Tania Estrada MD     • nicotine  7 mg Transdermal Daily Carmen Tamez MD     • pantoprazole  40 mg Oral BID AC YOLANDE Fiore     • pravastatin  80 mg Oral Daily With Davina Scott MD     • sodium chloride  1 spray Each Nare 4x Daily Tania Estrada MD     • tamsulosin  0 4 mg Oral Daily With Davina Scott MD     • umeclidinium bromide  1 puff Inhalation Daily Carmen Tamez MD          Today, Patient Was Seen By: Yu Burton    **Please Note: This note may have been constructed using a voice recognition system  **

## 2022-10-23 NOTE — PLAN OF CARE
Problem: Potential for Falls  Goal: Patient will remain free of falls  Description: INTERVENTIONS:  - Educate patient/family on patient safety including physical limitations  - Instruct patient to call for assistance with activity   - Consult OT/PT to assist with strengthening/mobility   - Keep Call bell within reach  - Keep bed low and locked with side rails adjusted as appropriate  - Keep care items and personal belongings within reach  - Initiate and maintain comfort rounds  - Make Fall Risk Sign visible to staff  - Offer Toileting every 2 Hours, in advance of need  - Initiate/Maintain tabs alarm  - Obtain necessary fall risk management equipment  - Apply yellow socks and bracelet for high fall risk patients  - Consider moving patient to room near nurses station  Outcome: Progressing     Problem: PAIN - ADULT  Goal: Verbalizes/displays adequate comfort level or baseline comfort level  Description: Interventions:  - Encourage patient to monitor pain and request assistance  - Assess pain using appropriate pain scale  - Administer analgesics based on type and severity of pain and evaluate response  - Implement non-pharmacological measures as appropriate and evaluate response  - Consider cultural and social influences on pain and pain management  - Notify physician/advanced practitioner if interventions unsuccessful or patient reports new pain  Outcome: Progressing     Problem: INFECTION - ADULT  Goal: Absence or prevention of progression during hospitalization  Description: INTERVENTIONS:  - Assess and monitor for signs and symptoms of infection  - Monitor lab/diagnostic results  - Monitor all insertion sites, i e  indwelling lines, tubes, and drains  - Monitor endotracheal if appropriate and nasal secretions for changes in amount and color  - [place] appropriate cooling/warming therapies per order  - Administer medications as ordered  - Instruct and encourage patient and family to use good hand hygiene technique  - Identify and instruct in appropriate isolation precautions for identified infection/condition  Outcome: Progressing  Goal: Absence of fever/infection during neutropenic period  Description: INTERVENTIONS:  - Monitor WBC    Outcome: Progressing     Problem: SAFETY ADULT  Goal: Patient will remain free of falls  Description: INTERVENTIONS:  - Educate patient/family on patient safety including physical limitations  - Instruct patient to call for assistance with activity   - Consult OT/PT to assist with strengthening/mobility   - Keep Call bell within reach  - Keep bed low and locked with side rails adjusted as appropriate  - Keep care items and personal belongings within reach  - Initiate and maintain comfort rounds  - Make Fall Risk Sign visible to staff  - Offer Toileting every 2 Hours, in advance of need  - Initiate/Maintain tabs alarm  - Obtain necessary fall risk management equipment  - Apply yellow socks and bracelet for high fall risk patients  - Consider moving patient to room near nurses station  Outcome: Progressing  Goal: Maintain or return to baseline ADL function  Description: INTERVENTIONS:  - Educate patient/family on patient safety including physical limitations  - Instruct patient to call for assistance with activity   - Consult OT/PT to assist with strengthening/mobility   - Keep Call bell within reach  - Keep bed low and locked with side rails adjusted as appropriate  - Keep care items and personal belongings within reach  - Initiate and maintain comfort rounds  - Make Fall Risk Sign visible to staff  - Offer Toileting every 2 Hours, in advance of need  - Initiate/Maintain bed alarm  - Obtain necessary fall risk management equipment  - Apply yellow socks and bracelet for high fall risk patients  - Consider moving patient to room near nurses station  Outcome: Progressing  Goal: Maintains/Returns to pre admission functional level  Description: INTERVENTIONS:  - Perform BMAT or MOVE assessment daily    - Set and communicate daily mobility goal to care team and patient/family/caregiver  - Collaborate with rehabilitation services on mobility goals if consulted  - Perform Range of Motion 3 times a day  - Reposition patient every 2 hours  - Dangle patient 3 times a day  - Stand patient 3 times a day  - Ambulate patient 3 times a day  - Out of bed to chair 3 times a day   - Out of bed for meals 3 times a day  - Out of bed for toileting  - Record patient progress and toleration of activity level   Outcome: Progressing     Problem: DISCHARGE PLANNING  Goal: Discharge to home or other facility with appropriate resources  Description: INTERVENTIONS:  - Identify barriers to discharge w/patient and caregiver  - Arrange for needed discharge resources and transportation as appropriate  - Identify discharge learning needs (meds, wound care, etc )  - Arrange for interpretive services to assist at discharge as needed  - Refer to Case Management Department for coordinating discharge planning if the patient needs post-hospital services based on physician/advanced practitioner order or complex needs related to functional status, cognitive ability, or social support system  Outcome: Progressing     Problem: Knowledge Deficit  Goal: Patient/family/caregiver demonstrates understanding of disease process, treatment plan, medications, and discharge instructions  Description: Complete learning assessment and assess knowledge base    Interventions:  - Provide teaching at level of understanding  - Provide teaching via preferred learning methods  Outcome: Progressing     Problem: RESPIRATORY - ADULT  Goal: Achieves optimal ventilation and oxygenation  Description: INTERVENTIONS:  - Assess for changes in respiratory status  - Assess for changes in mentation and behavior  - Position to facilitate oxygenation and minimize respiratory effort  - Oxygen administered by appropriate delivery if ordered  - Initiate smoking cessation education as indicated  - Encourage broncho-pulmonary hygiene including cough, deep breathe, Incentive Spirometry  - Assess the need for suctioning and aspirate as needed  - Assess and instruct to report SOB or any respiratory difficulty  - Respiratory Therapy support as indicated  Outcome: Progressing     Problem: Prexisting or High Potential for Compromised Skin Integrity  Goal: Skin integrity is maintained or improved  Description: INTERVENTIONS:  - Identify patients at risk for skin breakdown  - Assess and monitor skin integrity  - Assess and monitor nutrition and hydration status  - Monitor labs   - Assess for incontinence   - Turn and reposition patient  - Assist with mobility/ambulation  - Relieve pressure over bony prominences  - Avoid friction and shearing  - Provide appropriate hygiene as needed including keeping skin clean and dry  - Evaluate need for skin moisturizer/barrier cream  - Collaborate with interdisciplinary team   - Patient/family teaching  - Consider wound care consult   Outcome: Progressing     Problem: MOBILITY - ADULT  Goal: Maintain or return to baseline ADL function  Description: INTERVENTIONS:  - Educate patient/family on patient safety including physical limitations  - Instruct patient to call for assistance with activity   - Consult OT/PT to assist with strengthening/mobility   - Keep Call bell within reach  - Keep bed low and locked with side rails adjusted as appropriate  - Keep care items and personal belongings within reach  - Initiate and maintain comfort rounds  - Make Fall Risk Sign visible to staff  - Offer Toileting every 2 Hours, in advance of need  - Initiate/Maintain bed alarm  - Obtain necessary fall risk management equipment  - Apply yellow socks and bracelet for high fall risk patients  - Consider moving patient to room near nurses station  Outcome: Progressing  Goal: Maintains/Returns to pre admission functional level  Description: INTERVENTIONS:  - Perform BMAT or MOVE assessment daily    - Set and communicate daily mobility goal to care team and patient/family/caregiver  - Collaborate with rehabilitation services on mobility goals if consulted  - Perform Range of Motion 3 times a day  - Reposition patient every 2 hours    - Dangle patient 3 times a day  - Stand patient 3 times a day  - Ambulate patient 3 times a day  - Out of bed to chair 3 times a day   - Out of bed for meals 3 times a day  - Out of bed for toileting  - Record patient progress and toleration of activity level   Outcome: Progressing     Problem: HEMATOLOGIC - ADULT  Goal: Maintains hematologic stability  Description: INTERVENTIONS  - Assess for signs and symptoms of bleeding or hemorrhage  - Monitor labs  - Administer supportive blood products/factors as ordered and appropriate  Outcome: Progressing

## 2022-10-23 NOTE — PROGRESS NOTES
NEPHROLOGY PROGRESS NOTE   Kaylin Aguilar  78 y o  male MRN: [id number]  Unit/Bed#: [facility] 627-01 Encounter: [id number]  Reason for Consult: MARANDA    ASSESSMENT AND PLAN:  Patient is 70-year-old male with significant medical issues of uncontrolled diabetes, COPD, history of prostate cancer, status post radiation/seed treatment, CAD, status post PCI, diastolic CHF, was recently hospitalized with shortness of breath and now presents with shortness of breath again   We are consulted for MARANDA/CKD management      MARANDA POA on CKD stage III to stage IV, baseline creatinine 1 4 to 1 7 fluctuating going back to mid 2021  -since recent hospitalization creatinine seems to have plateaued 2 6 to 2 8  -creatinine overall stable 2 3 today   -overall some progression of CKD suspected as well  -CKD suspect in the setting of hypertension, uncontrolled diabetes  -bladder scan nonsignificant  -diuretics as below  -recent UA this month shows no hematuria or proteinuria   -avoid nephrotoxins or NSAIDs, BMP in a m   -continue to avoid ARB for now     Diastolic CHF  -recent echo in May 2022 shows EF 60%, dilated IVC  -continue Lasix 40 mg p o  Daily  -remains on room air at the time of my encounter   -daily standing weight, accurate intake and output, weight fluctuating     Hyperkalemia, now resolved  -suspect secondary to uncontrolled hyperglycemia, potassium supplements, dietary component in the setting of advanced renal failure     -monitor with Lasix  -BMP in a m   -strict low-potassium diet recommended  -needs better control of blood glucose     Anemia in CKD, blood loss anemia, GI bleed  -transfuse p r n  For hemoglobin less than seven, iron saturation 23%  -status post blood transfusion this admission   Status post EGD showing duodenal ulcer with stigmata of recent bleeding, treated with epi    -FOBT positive  -close GI follow-up      COPD with recent exacerbation, pulmonary following, status post steroids   Currently remains on room air      Severe azotemia, suspect in the setting of use of recent steroids, GI bleed, FOBT positive, underlying renal failure   -azotemia overall much improving     Discussed above plan in detail with primary team    SUBJECTIVE:  Patient seen and examined at bedside  Denies chest pain, shortness of breath, nausea      OBJECTIVE:  Current Weight: Weight - Scale: 95 8 kg (211 lb 3 2 oz)  Vitals:    10/23/22 0804   BP:    Pulse:    Resp:    Temp:    SpO2: 97%       Intake/Output Summary (Last 24 hours) at 10/23/2022 1038  Last data filed at 10/23/2022 0900  Gross per 24 hour   Intake 290 ml   Output 1375 ml   Net -1085 ml     Wt Readings from Last 3 Encounters:   10/23/22 95 8 kg (211 lb 3 2 oz)   10/15/22 98 7 kg (217 lb 11 1 oz)   09/17/22 101 kg (222 lb)     Temp Readings from Last 3 Encounters:   10/23/22 98 °F (36 7 °C)   10/15/22 97 5 °F (36 4 °C)   09/17/22 97 6 °F (36 4 °C) (Oral)     BP Readings from Last 3 Encounters:   10/23/22 110/52   10/15/22 94/66   09/17/22 110/54     Pulse Readings from Last 3 Encounters:   10/23/22 70   10/15/22 66   09/17/22 82        Physical Examination:  General:  Lying in bed, no acute distress   Eyes:  Mild conjunctival pallor present  ENT:  External examination of ears and nose unremarkable  Neck:  No Obvious lymphadenopathy appreciated  Respiratory:  Bilateral air entry present  CVS:  1+ edema in both legs  GI:  Soft nondistended  CNS:  Active alert oriented x3  Skin:  No new rash  Musculoskeletal:  No obvious new gross deformity noted    Medications:    Current Facility-Administered Medications:   •  acetaminophen (TYLENOL) tablet 650 mg, 650 mg, Oral, Q6H PRN, Annel Barillas MD, 650 mg at 10/23/22 0522  •  al mag oxide-diphenhydramine-lidocaine viscous (MAGIC MOUTHWASH) suspension 10 mL, 10 mL, Swish & Swallow, Q4H PRN, Denzel Jones MD, 10 mL at 10/21/22 1331  •  albuterol inhalation solution 2 5 mg, 2 5 mg, Nebulization, Q4H PRN, Sebastian Chavez MD  • apixaban (ELIQUIS) tablet 5 mg, 5 mg, Oral, BID, Sangeetha Westfall, YOLANDE, 5 mg at 10/23/22 0818  •  aspirin (ECOTRIN LOW STRENGTH) EC tablet 81 mg, 81 mg, Oral, Daily, Adela Culver MD, 81 mg at 10/23/22 0818  •  carvedilol (COREG) tablet 6 25 mg, 6 25 mg, Oral, BID With Meals, Dominguez Stack PA-C, 6 25 mg at 10/23/22 0818  •  cyclobenzaprine (FLEXERIL) tablet 10 mg, 10 mg, Oral, BID PRN, Adela Culver MD, 10 mg at 10/19/22 0915  •  EPINEPHrine (ADRENALIN) injection, , Intravenous, Code/Trauma/Sedation Med, Aaron [employer], DO, 0 4 mg at 10/20/22 1428  •  ferrous sulfate tablet 325 mg, 325 mg, Oral, Daily With Breakfast, Adela Culver MD, 325 mg at 10/23/22 0819  •  fluticasone-vilanterol (BREO ELLIPTA) 100-25 mcg/inh inhaler 1 puff, 1 puff, Inhalation, Daily, Adela Culver MD, 1 puff at 10/23/22 0827  •  furosemide (LASIX) tablet 40 mg, 40 mg, Oral, Daily, Oksana Berger MD, 40 mg at 10/23/22 0818  •  gabapentin (NEURONTIN) capsule 100 mg, 100 mg, Oral, TID, Adela Culver MD, 100 mg at 10/23/22 4851  •  guaiFENesin (MUCINEX) 12 hr tablet 600 mg, 600 mg, Oral, BID, Adela Culver MD, 600 mg at 10/23/22 0818  •  hydrALAZINE (APRESOLINE) injection 5 mg, 5 mg, Intravenous, Q6H PRN, Dominguez Stack PA-C  •  insulin aspart protamine-insulin aspart (NovoLOG 70/30) 100 units/mL subcutaneous injection 25 Units, 25 Units, Subcutaneous, Before Kamala Tompkins MD, 25 Units at 10/22/22 1710  •  insulin aspart protamine-insulin aspart (NovoLOG 70/30) 100 units/mL subcutaneous injection 30 Units, 30 Units, Subcutaneous, Daily Before Lunch, Lolita Lindsey MD, 30 Units at 10/22/22 1223  •  insulin aspart protamine-insulin aspart (NovoLOG 70/30) 100 units/mL subcutaneous injection 40 Units, 40 Units, Subcutaneous, Daily With Breakfast, Lolita Lindsey MD, 40 Units at 10/23/22 0818  •  insulin lispro (HumaLOG) 100 units/mL subcutaneous injection 1-6 Units, 1-6 Units, Subcutaneous, TID AC, 6 Units at 10/22/22 1712 **AND** Fingerstick Glucose (POCT), , , TID AC, Sebas Granda MD  •  isosorbide mononitrate (IMDUR) 24 hr tablet 30 mg, 30 mg, Oral, Daily, Bobbi Francis, DO, 30 mg at 10/23/22 6099  •  levalbuterol (XOPENEX) inhalation solution 1 25 mg, 1 25 mg, Nebulization, TID, 1 25 mg at 10/23/22 0803 **AND** sodium chloride 0 9 % inhalation solution 3 mL, 3 mL, Nebulization, TID, Eors Garcia MD, 3 mL at 10/23/22 0804  •  lidocaine (LIDODERM) 5 % patch 1 patch, 1 patch, Topical, Daily, Eros Garcia MD, 1 patch at 10/23/22 0595  •  melatonin tablet 3 mg, 3 mg, Oral, HS, YOLANDE Trevino, 3 mg at 10/22/22 2152  •  mupirocin (BACTROBAN) 2 % ointment, , Topical, BID, Julius Alcantara MD, Given at 10/21/22 9859  •  nicotine (NICODERM CQ) 7 mg/24hr TD 24 hr patch 7 mg, 7 mg, Transdermal, Daily, Eros Garcia MD, 7 mg at 10/23/22 0827  •  pantoprazole (PROTONIX) EC tablet 40 mg, 40 mg, Oral, BID AC, YOLANDE Fiore, 40 mg at 10/23/22 0501  •  pravastatin (PRAVACHOL) tablet 80 mg, 80 mg, Oral, Daily With Anh David MD, 80 mg at 10/22/22 1710  •  sodium chloride (OCEAN) 0 65 % nasal spray 1 spray, 1 spray, Each Nare, 4x Daily, Julius Alcantara MD, 1 spray at 10/22/22 2152  •  tamsulosin [facility]) capsule 0 4 mg, 0 4 mg, Oral, Daily With Dinner, Eros Garcia MD, 0 4 mg at 10/22/22 1710  •  umeclidinium bromide (INCRUSE ELLIPTA) 62 5 mcg/inh inhaler AEPB 1 puff, 1 puff, Inhalation, Daily, Eros Garcia MD, 1 puff at 10/22/22 8331    Laboratory Results:  Results from last 7 days   Lab Units 10/23/22  0457 10/22/22  1802 10/22/22  0908 10/22/22  9271 10/22/22  0032 10/21/22  1654 10/21/22  3005 10/20/22  1632 10/20/22  1056 10/20/22  0431 10/19/22  2335 10/19/22  0714 10/19/22  0531 10/18/22  0847 10/17/22  1504 10/17/22  1003   WBC Thousand/uL 8 87  --   --  9 91  --   --  12 33*  --  9 86 7 75 8 95 10 26*  --   --    < > 12 10*   HEMOGLOBIN g/dL 7 8* 7 9* 8 3* 7 4* 7 5* 6 2* 7 3*   < > 6 6* 5 8* 6 4* 7 9*  --   --    < > 9 9*   HEMATOCRIT % 24 4* 24 2* 25 7* 22 6* 22 1* 19 1* 22 3*   < > 20 8* 18 1* 20 6* 25 6*  --   --    < > 32 8*   PLATELETS Thousands/uL 98*  --   --  88*  --   --  110*  --  117* 116* 148* 140*  --   --    < > 171   SODIUM mmol/L 139  --   --  140  --   --  140  --   --  140 136  --  139 136   < > 137   POTASSIUM mmol/L 3 9  --   --  3 9  --   --  4 7  --   --  4 3 5 3  --  4 6 5 5*   < > 5 7*   CHLORIDE mmol/L 104  --   --  106  --   --  105  --   --  104 100  --  105 102   < > 105   CO2 mmol/L 30  --   --  29  --   --  29  --   --  30 30  --  26 25   < > 25   BUN mg/dL 67*  --   --  72*  --   --  91*  --   --  110* 106*  --  100* 86*   < > 78*   CREATININE mg/dL 2 39*  --   --  2 20*  --   --  2 44*  --   --  2 64* 2 74*  --  2 72* 2 78*   < > 2 61*   CALCIUM mg/dL 7 8*  --   --  7 9*  --   --  7 9*  --   --  7 6* 7 7*  --  8 0* 8 5   < > 8 5   MAGNESIUM mg/dL  --   --   --   --   --   --   --   --   --   --   --   --   --   --   --  2 6   PHOSPHORUS mg/dL  --   --   --   --   --   --   --   --   --   --   --   --   --   --   --  5 2*    < > = values in this interval not displayed  XR chest 1 view portable   Final Result by Romel Patiño MD (10/17 8325)      Stable appearance of volume loss and chronic opacities in the right                  Workstation performed: [id number]             Portions of the record may have been created with voice recognition software  Occasional wrong word or "sound a like" substitutions may have occurred due to the inherent limitations of voice recognition software  Read the chart carefully and recognize, using context, where substitutions have occurred

## 2022-10-23 NOTE — DISCHARGE SUMMARY
[address] [facility]  Discharge- Mita Holman Sr  1945, 78 y o  male MRN: [id number]  Unit/Bed#: [facility] 627-01 Encounter: [id number]  Primary Care Provider: Nadya Flores MD   Date and time admitted to hospital: 10/16/2022  8:22 PM    * Acute on chronic respiratory failure with hypoxia and hypercapnia (HCC)-resolved as of 10/23/2022  Assessment & Plan  Presenting with complaints of SOB  At baseline uses 2 L NC as needed however was acutely hypoxic on admission requiring 3-4 L NC   · Pulmonology input appreciated, do not suspect recurrent COPD exacerbation  Completed short course of PO Prednisone on 10/19  · Resolved  Back to baseline  Continue home medications on discharge  PUD (peptic ulcer disease)  Assessment & Plan  EGD revealed bleeding duodenal ulcer s/p APC  · PPI therapy  · Appreciate GI recommendations  Cleared from their end for discharge  ABLA (acute blood loss anemia)  Assessment & Plan  He is s/p 2u pRBC and EGD  Findings show Duodenal sweep ulcer with stigmata of recent bleeding  Treated with epi, APC, and eventual hemospray  · Deemed stable to restart Eliquis  · Iron supplementation    Recent Labs     10/22/22  4773 10/22/22  0908 10/22/22  1802 10/23/22  0457 10/24/22  0559   HGB 7 4* 8 3* 7 9* 7 8* 7 3*   MCV 88  --   --  90 92   RDW 16 1*  --   --  16 4* 16 6*       A-fib (HCC)  Assessment & Plan  AC: Eliquis  Rate control: Coreg    Stage 3b chronic kidney disease (CKD) (HCC)  Assessment & Plan  Lab Results   Component Value Date    EGFR 24 10/24/2022    EGFR 25 10/23/2022    EGFR 28 10/22/2022    CREATININE 2 50 (H) 10/24/2022    CREATININE 2 39 (H) 10/23/2022    CREATININE 2 20 (H) 10/22/2022     Evaluated by nephrology on recent admission, recent baseline has been in the low 2s and was discharged with creatinine of 2 6  · Creatinine is currently stable   Cleared by nephrology for discharge  · Repeat BMP and outpatient follow-up to be coordinated by nephrology  CAD (coronary artery disease)  Assessment & Plan  NM stress test in May 2022 unremarkable   · Troponin trend negative and EKG without ischemic changes   · Continue aspirin, statin, beta-blocker   · Cardiology input appreciated, do not recommend any additional ischemic work up at this time, added Imdur  Cleared for discharge from their end  They will arrange follow-up on discharge    Chronic diastolic (congestive) heart failure (HCC)  Assessment & Plan  Wt Readings from Last 3 Encounters:   10/23/22 95 8 kg (211 lb 3 2 oz)   10/15/22 98 7 kg (217 lb 11 1 oz)   09/17/22 101 kg (222 lb)     Euvolemic   · Most recent echo with LVEF 60%  · Continue home dose Coreg and Lasix    Essential hypertension  Assessment & Plan  · Continue home dose Coreg and Lasix  · Monitor routinely     Type 2 diabetes mellitus with hyperglycemia, with long-term current use of insulin [facility])  Assessment & Plan  Lab Results   Component Value Date    HGBA1C 9 7 (H) 10/10/2022       Recent Labs     10/23/22  1756 10/23/22  2041 10/24/22  0817 10/24/22  1234   POCGLU 284* 237* 116 175*       · Labile glucose levels  Cleared by endocrinology for discharged  Mixed regimen discontinued in favor of basal bolus regimen  Lantus 40U HS, Lispro 15U TID  Supplies sent  HLD (hyperlipidemia)  Assessment & Plan  On pravastatin 80 mg daily    COPD (chronic obstructive pulmonary disease) ([facility] [address] )  Assessment & Plan  Recent admission for COPD exacerbation, discharge on 10/15 with 5 days prednisone  Re-presents due to acute onset shortness of breath  On admission found to by hypoxic with respiratory acidosis  · Completed prednisone on 10/19  · Continue with Breo/Incruse 100/25/62 5 daily  · Guaifenesin for expectoration 600mg twice daily  · Pulmonary recommendations appreciated  Cleared from their end for discharge      Medical Problems             Resolved Problems  Date Reviewed: 10/22/2022          Resolved    * (Principal) Acute on chronic respiratory failure with hypoxia and hypercapnia ([facility] [address] ) 10/23/2022     Resolved by  Yu Burton PA-C              Discharging Physician / Practitioner: Dr Yael Joya  PCP: Yoandy Méndez MD  Admission Date:   Admission Orders (From admission, onward)     Ordered        10/16/22 2220  [address] [place] [address],[address]  Once                      Discharge Date: 10/24/2022    Consultations During Hospital Stay:  · Gi, endocrinology, cardiology, pulm, pt, ot, cm, nephro    Procedures Performed:   EGD 10/19 - FINDINGS:  · The esophagus appeared normal   · Single ulcer at the duodenal sweep at a very difficult angle  It had a nonbleeding visible vessel (Pierce IIA); induced coagulation with argon plasma coagulation; injected 4 mL of epinephrine to address bleeding; and given the difficult angle and concern for rebleeding, hemospray was also used  · The stomach appeared normal  On direct and retroflexed views        SPECIMENS:  * No specimens in log *        IMPRESSION:  Duodenal sweep ulcer with stigmata of recent bleeding  Treated with epi, APC, and eventual hemospray        RECOMMENDATION:  Iron supplementation  Ok to resume Eliquis    Significant Findings / Test Results:   XR chest 1 view portable   Final Result by Margarette Espitia MD (10/17 7335)      Stable appearance of volume loss and chronic opacities in the right                  Workstation performed: [id number]               Incidental Findings:   · None    Test Results Pending at Discharge (will require follow up): · None     Outpatient Tests Requested:  · None    Complications:  None    Reason for Admission: sob    Hospital Course:   Carlos Harding  is a 78 y o  male patient with pmh copd, CHF, CAD, lumbar degenerative disc disease, type 2 diabetes on insulin, GERD, hyperlipidemia, hypertension, history of an MI with PCI, JACQUELINE, prostate cancer who originally presented to the hospital on 10/16/2022 due to shortness of breath    Patient was just discharged from the hospital for COPD exacerbation, pulmonology was consulted this hospitalization and determine this likely was not a COPD exacerbation, they were suggested he complete the prednisone which is prescribed to him from the prior hospitalization in continue his home inhaler therapy as well as scheduled nebulizers while inpatient  Patient had improved work of breathing  He was originally hypoxic with acute respiratory failure needing O2 supplementation by nasal cannula, this was resolved prior to discharge  Cardiology was consulted because of his cardiac history and the new onset shortness of breath  They determine that there is no need for further ischemic workup based on normal troponins and non concerning EKGs  They added Imdur to his regimen  Anemia of chronic disease from CKD was noted to be down trending as well as worsening MARANDA, nephrology was consulted as well as GI  Patient had GI upper endoscopy on 10/19/2022 which showed bleeding ulcer which was treated with epinephrine  Patient's hemoglobin remained stable and he resumed home Eliquis without further drop in hemoglobin  GI cleared him for discharge  Nephrology thought that MARANDA was from GI bleed as well as simply progressive CKD and will follow-up with him in the outpatient setting  Further optimization was requested, but patient insisted that he be discharged today  Nephrology cleared him for discharge and they will arrange a close follow-up appointment for him  Endocrinology was also consulted while inpatient because of fluctuating blood glucose readings and the need to titrate his 70/30 insulin regimen  Spoke to Dr Queenie Eden this morning from endocrinology, the recommendations was basal bolus therapy instead  He will be discharged with Lantus 40U HS and Lispro 15U TID with close follow-up with his endocrinologist and PCP       Patient agrees to follow-up with his providers as scheduled and to take his medications as prescribed  All questions were addressed  he understood the need to seek immediate medical attention should he develop any chest pain, shortness of breath, severe pain, fever, chills, or any other concerning symptoms  Please see above list of diagnoses and related plan for additional information  Condition at Discharge: stable    Discharge Day Visit / Exam:   Subjective:  Patient seen and examined at bedside  Comfortable  No new complaints overnight  Vitals: Blood Pressure: 110/52 (manual recheck) (10/23/22 0741)  Pulse: 70 (10/23/22 0741)  Temperature: 98 °F (36 7 °C) (10/23/22 0741)  Temp Source: Tympanic (10/20/22 1545)  Respirations: 18 (10/23/22 0741)  Height: 6' (182 9 cm) (10/17/22 0107)  Weight - Scale: 95 8 kg (211 lb 3 2 oz) (10/23/22 0531)  SpO2: 96 % (10/23/22 1325)  Exam:    Physical Exam  Vitals reviewed  Constitutional:       General: He is not in acute distress  HENT:      Head: Normocephalic  Nose: Nose normal       Mouth/Throat:      Mouth: Mucous membranes are moist    Eyes:      General: No scleral icterus  Cardiovascular:      Rate and Rhythm: Normal rate and regular rhythm  Pulmonary:      Effort: Pulmonary effort is normal  No respiratory distress  Abdominal:      General: There is no distension  Palpations: Abdomen is soft  Tenderness: There is no abdominal tenderness  Musculoskeletal:      Right lower leg: Edema present  Left lower leg: Edema present  Skin:     General: Skin is warm  Neurological:      Mental Status: He is alert  Mental status is at baseline  Psychiatric:         Mood and Affect: Mood normal          Behavior: Behavior normal          Discussion with Family: offered, declined    Discharge instructions/Information to patient and family:   See after visit summary for information provided to patient and family        Provisions for Follow-Up Care:  See after visit summary for information related to follow-up care and any pertinent home health orders  Disposition:   Home    Planned Readmission: none     Discharge Statement:  I spent 45 minutes discharging the patient  This time was spent on the day of discharge  I had direct contact with the patient on the day of discharge  Greater than 50% of the total time was spent examining patient, answering all patient questions, arranging and discussing plan of care with patient as well as directly providing post-discharge instructions  Additional time then spent on discharge activities  Discharge Medications:  See after visit summary for reconciled discharge medications provided to patient and/or family        **Please Note: This note may have been constructed using a voice recognition system**

## 2022-10-23 NOTE — ASSESSMENT & PLAN NOTE
Lab Results   Component Value Date    HGBA1C 9 7 (H) 10/10/2022       Recent Labs     10/22/22  1157 10/22/22  1652 10/22/22  2042 10/23/22  0741   POCGLU 440* 381* 368* 117       · Recently discharged on new dosing regimen of Humalog 75/25 - 50 units, 40 units, 30 units with breakfast lunch and dinner respectively  Reported labile blood glucose at home, as high as 300s as low as 20s  · Endocrinology following, appreciate recommendations; start Lantus 40 units today at bedtime and lispro 15 units tid with meals + correctional   · Patient will not be able to afford this on dc however Endo is titrating using this method and then will convert over to 70/30 prior to dc to make it more affordable    · Carb controlled diet   · Hypoglycemia protocol

## 2022-10-23 NOTE — ASSESSMENT & PLAN NOTE
Wt Readings from Last 3 Encounters:   10/23/22 95 8 kg (211 lb 3 2 oz)   10/15/22 98 7 kg (217 lb 11 1 oz)   09/17/22 101 kg (222 lb)     · Euvolemic   · Most recent echo with LVEF 60%  · Continue home dose Coreg and Lasix

## 2022-10-23 NOTE — ASSESSMENT & PLAN NOTE
Likely related to hyperglycemia and CKD   · Gina Brash was discontinued   · Now resolved    · Monitor

## 2022-10-23 NOTE — DISCHARGE INSTR - AVS FIRST PAGE
Dear Lilly Khanna ,     It was our pleasure to care for you here at [facility]  At this time we provide for you here, the most important instructions / recommendations at discharge:     Notable Medication Adjustments -   BREAKFAST INSULIN = 40 units   LUNCH INSULIN = 30 units  DINNER INSULIN = 25 units  Cardiology medication : imdur 30 mg daily for shortness of breath/chest pains/ angina  Unfortunately the hospital does not carry samples of this medication so you will have to wait until you have enough money to fill the Rx or ask your pharmacist for coupons  Eliquis 5 mg twice a day - blood thinner to prevent stroke  Testing Required after Discharge -   None  Important follow up information -   Endocrinology  Keep the upcoming primary care appointment  Nephrology (kidney doctor) will call you to make a follow up appt   Other Instructions -   Ensure adequate hydration with approximately 64 fl oz water daily  Please review this entire after visit summary as additional general instructions including medication list, appointments, activity, diet, any pertinent wound care, and other additional recommendations from your care team that may be provided for you        Sincerely,     Awilda Aaron PA-C

## 2022-10-23 NOTE — ASSESSMENT & PLAN NOTE
Recent admission for COPD exacerbation, discharge on 10/15 with 5 days prednisone  Re-presents due to acute onset shortness of breath  On admission found to by hypoxic with respiratory acidosis  · Completed prednisone on 10/19  · Continue with Breo/Incruse 100/25/62 5 daily  · Guaifenesin for expectoration 600mg twice daily  · Xopenex round-the-clock and p r n  Praveena Alondra · Pulm recs appreciated; they signed off

## 2022-10-23 NOTE — PROGRESS NOTES
Progress Note - Star Calix   78 y o  male MRN: [id number]    Unit/Bed#: PPHP 627-01 Encounter: [id number]      CC: diabetes f/u    Subjective: This is a 78y o -year-old male with Type 2 diabetes on long-term insulin complicated by diabetic nephropathy and CAD admitting had  for COPD exacerbation  Pt currently feels ok        Objective:     Vitals: Blood pressure 110/52, pulse 70, temperature 98 °F (36 7 °C), resp  rate 18, height 6' (1 829 m), weight 95 8 kg (211 lb 3 2 oz), SpO2 97 %  ,Body mass index is 28 64 kg/m²  Intake/Output Summary (Last 24 hours) at 10/23/2022 1218  Last data filed at 10/23/2022 0900  Gross per 24 hour   Intake 290 ml   Output 1375 ml   Net -1085 ml       Physical Exam:  General Appearance: awake, appears stated age and cooperative  Head: Normocephalic, without obvious abnormality, atraumatic  Extremities: moves all extremities  Skin: Skin color and temperature normal    Pulm: no labored breathing        POC Glucose (mg/dl)   Date Value   10/23/2022 171 (H)   10/23/2022 117   10/22/2022 368 (H)   10/22/2022 381 (H)   10/22/2022 440 (H)   10/22/2022 102   10/22/2022 39 (LL)   10/21/2022 243 (H)   10/21/2022 274 (H)   10/21/2022 392 (H)     Assessment:   This is a 69 y o -year-old male with Type 2 diabetes on long-term insulin complicated by diabetic nephropathy and CAD admitting had  for COPD exacerbation, currently off of  steroids      Plan:  # type 2 diabetes with uncontrolled hyperglycemia  # COPD on steroids  A1c: 9 7 (10/2022)  Home regimen:  40 units with breakfast and lunch, 35U with dinner     Current regimen: Mix insulin 70/30  40 units with breakfast, 30 with lunch and 25 units with dinner  Pt has been having episode of hyperglycemia at bedtime despite receiving 70/30 mx insulin at dinner time and then glucose drops early in the AM  Is this has been happening during the last few days   We recommend to discontinue mix insulin 70/30 and to start Lantus 40 units today at bedtime and lispro 15 units tid with meals + correctional to aim for a better control  Continue with accu checks  Portions of the record may have been created with voice recognition software

## 2022-10-23 NOTE — ASSESSMENT & PLAN NOTE
Noted, appears baseline Hgb has been around 12  Hgb has been slowly trending down     · From GIB see above  · Hgb stable since resuming Eliquis on 10/21  · Iron supplementation

## 2022-10-24 ENCOUNTER — TELEPHONE (OUTPATIENT)
Dept: NEPHROLOGY | Facility: CLINIC | Age: 77
End: 2022-10-24

## 2022-10-24 ENCOUNTER — TELEPHONE (OUTPATIENT)
Dept: NEPHROLOGY | Facility: HOSPITAL | Age: 77
End: 2022-10-24

## 2022-10-24 VITALS
HEIGHT: 72 IN | SYSTOLIC BLOOD PRESSURE: 133 MMHG | HEART RATE: 65 BPM | WEIGHT: 211.2 LBS | OXYGEN SATURATION: 100 % | DIASTOLIC BLOOD PRESSURE: 52 MMHG | TEMPERATURE: 97.9 F | RESPIRATION RATE: 16 BRPM | BODY MASS INDEX: 28.61 KG/M2

## 2022-10-24 DIAGNOSIS — N18.32 STAGE 3B CHRONIC KIDNEY DISEASE (CKD) (HCC): ICD-10-CM

## 2022-10-24 DIAGNOSIS — I10 ESSENTIAL HYPERTENSION: Primary | ICD-10-CM

## 2022-10-24 LAB
ANION GAP SERPL CALCULATED.3IONS-SCNC: 7 MMOL/L (ref 4–13)
BUN SERPL-MCNC: 58 MG/DL (ref 5–25)
CALCIUM SERPL-MCNC: 7.6 MG/DL (ref 8.3–10.1)
CHLORIDE SERPL-SCNC: 106 MMOL/L (ref 96–108)
CO2 SERPL-SCNC: 21 MMOL/L (ref 21–32)
CREAT SERPL-MCNC: 2.5 MG/DL (ref 0.6–1.3)
ERYTHROCYTE [DISTWIDTH] IN BLOOD BY AUTOMATED COUNT: 16.6 % (ref 11.6–15.1)
GFR SERPL CREATININE-BSD FRML MDRD: 24 ML/MIN/1.73SQ M
GLUCOSE SERPL-MCNC: 108 MG/DL (ref 65–140)
GLUCOSE SERPL-MCNC: 116 MG/DL (ref 65–140)
GLUCOSE SERPL-MCNC: 175 MG/DL (ref 65–140)
GLUCOSE SERPL-MCNC: 76 MG/DL (ref 65–140)
GLUCOSE SERPL-MCNC: 95 MG/DL (ref 65–140)
HCT VFR BLD AUTO: 23.1 % (ref 36.5–49.3)
HGB BLD-MCNC: 7.3 G/DL (ref 12–17)
MCH RBC QN AUTO: 29.1 PG (ref 26.8–34.3)
MCHC RBC AUTO-ENTMCNC: 31.6 G/DL (ref 31.4–37.4)
MCV RBC AUTO: 92 FL (ref 82–98)
PLATELET # BLD AUTO: 89 THOUSANDS/UL (ref 149–390)
PMV BLD AUTO: 11.3 FL (ref 8.9–12.7)
POTASSIUM SERPL-SCNC: 4.4 MMOL/L (ref 3.5–5.3)
RBC # BLD AUTO: 2.51 MILLION/UL (ref 3.88–5.62)
SODIUM SERPL-SCNC: 134 MMOL/L (ref 135–147)
WBC # BLD AUTO: 8.02 THOUSAND/UL (ref 4.31–10.16)

## 2022-10-24 PROCEDURE — 94760 N-INVAS EAR/PLS OXIMETRY 1: CPT

## 2022-10-24 PROCEDURE — 80048 BASIC METABOLIC PNL TOTAL CA: CPT | Performed by: INTERNAL MEDICINE

## 2022-10-24 PROCEDURE — 99232 SBSQ HOSP IP/OBS MODERATE 35: CPT | Performed by: INTERNAL MEDICINE

## 2022-10-24 PROCEDURE — 85027 COMPLETE CBC AUTOMATED: CPT | Performed by: PHYSICIAN ASSISTANT

## 2022-10-24 PROCEDURE — 99239 HOSP IP/OBS DSCHRG MGMT >30: CPT | Performed by: STUDENT IN AN ORGANIZED HEALTH CARE EDUCATION/TRAINING PROGRAM

## 2022-10-24 PROCEDURE — 97116 GAIT TRAINING THERAPY: CPT

## 2022-10-24 PROCEDURE — 82948 REAGENT STRIP/BLOOD GLUCOSE: CPT

## 2022-10-24 PROCEDURE — 97530 THERAPEUTIC ACTIVITIES: CPT

## 2022-10-24 RX ORDER — FERROUS SULFATE 325(65) MG
325 TABLET ORAL
Qty: 30 TABLET | Refills: 0 | Status: SHIPPED | OUTPATIENT
Start: 2022-10-24 | End: 2022-11-23

## 2022-10-24 RX ORDER — LANCETS 33 GAUGE
EACH MISCELLANEOUS
Qty: 100 EACH | Refills: 0 | Status: SHIPPED | OUTPATIENT
Start: 2022-10-24

## 2022-10-24 RX ORDER — CARVEDILOL 6.25 MG/1
6.25 TABLET ORAL 2 TIMES DAILY WITH MEALS
Qty: 60 TABLET | Refills: 0 | Status: SHIPPED | OUTPATIENT
Start: 2022-10-24 | End: 2022-11-23

## 2022-10-24 RX ORDER — INSULIN GLARGINE 100 [IU]/ML
32 INJECTION, SOLUTION SUBCUTANEOUS
Status: DISCONTINUED | OUTPATIENT
Start: 2022-10-24 | End: 2022-10-24 | Stop reason: HOSPADM

## 2022-10-24 RX ORDER — BLOOD-GLUCOSE METER
KIT MISCELLANEOUS
Qty: 1 KIT | Refills: 0 | Status: SHIPPED | OUTPATIENT
Start: 2022-10-24

## 2022-10-24 RX ORDER — POTASSIUM CHLORIDE 20 MEQ/1
20 TABLET, EXTENDED RELEASE ORAL DAILY
Qty: 30 TABLET | Refills: 0 | Status: SHIPPED | OUTPATIENT
Start: 2022-10-24 | End: 2022-10-24

## 2022-10-24 RX ORDER — ASPIRIN 81 MG/1
81 TABLET ORAL DAILY
Qty: 30 TABLET | Refills: 0 | Status: SHIPPED | OUTPATIENT
Start: 2022-10-24 | End: 2022-11-23

## 2022-10-24 RX ORDER — TAMSULOSIN HYDROCHLORIDE 0.4 MG/1
0.4 CAPSULE ORAL
Qty: 30 CAPSULE | Refills: 0 | Status: SHIPPED | OUTPATIENT
Start: 2022-10-24 | End: 2022-11-23

## 2022-10-24 RX ORDER — INSULIN LISPRO 100 [IU]/ML
15 INJECTION, SOLUTION INTRAVENOUS; SUBCUTANEOUS
Qty: 15 ML | Refills: 0 | Status: ON HOLD | OUTPATIENT
Start: 2022-10-24 | End: 2022-11-02 | Stop reason: SDUPTHER

## 2022-10-24 RX ORDER — PANTOPRAZOLE SODIUM 40 MG/1
40 TABLET, DELAYED RELEASE ORAL
Qty: 60 TABLET | Refills: 0 | Status: SHIPPED | OUTPATIENT
Start: 2022-10-24 | End: 2022-11-23

## 2022-10-24 RX ORDER — FUROSEMIDE 40 MG/1
40 TABLET ORAL DAILY
Qty: 30 TABLET | Refills: 0 | Status: SHIPPED | OUTPATIENT
Start: 2022-10-24 | End: 2022-11-23

## 2022-10-24 RX ORDER — GLUCOSAMINE HCL/CHONDROITIN SU 500-400 MG
CAPSULE ORAL
Qty: 100 EACH | Refills: 0 | Status: SHIPPED | OUTPATIENT
Start: 2022-10-24

## 2022-10-24 RX ORDER — ISOSORBIDE MONONITRATE 30 MG/1
30 TABLET, EXTENDED RELEASE ORAL DAILY
Qty: 30 TABLET | Refills: 0 | Status: SHIPPED | OUTPATIENT
Start: 2022-10-24

## 2022-10-24 RX ORDER — PEN NEEDLE, DIABETIC 32GX 5/32"
NEEDLE, DISPOSABLE MISCELLANEOUS
Qty: 100 EACH | Refills: 0 | Status: SHIPPED | OUTPATIENT
Start: 2022-10-24

## 2022-10-24 RX ORDER — SIMVASTATIN 40 MG
40 TABLET ORAL DAILY
Qty: 30 TABLET | Refills: 0 | Status: SHIPPED | OUTPATIENT
Start: 2022-10-24 | End: 2022-11-23

## 2022-10-24 RX ORDER — LIDOCAINE 50 MG/G
1 PATCH TOPICAL DAILY
Qty: 6 PATCH | Refills: 0 | Status: SHIPPED | OUTPATIENT
Start: 2022-10-24 | End: 2022-10-30

## 2022-10-24 RX ORDER — INSULIN GLARGINE 100 [IU]/ML
40 INJECTION, SOLUTION SUBCUTANEOUS
Qty: 12 ML | Refills: 0 | Status: ON HOLD | OUTPATIENT
Start: 2022-10-24 | End: 2022-11-02 | Stop reason: SDUPTHER

## 2022-10-24 RX ORDER — BLOOD SUGAR DIAGNOSTIC
STRIP MISCELLANEOUS
Qty: 100 EACH | Refills: 0 | Status: SHIPPED | OUTPATIENT
Start: 2022-10-24

## 2022-10-24 RX ADMIN — CARVEDILOL 6.25 MG: 6.25 TABLET, FILM COATED ORAL at 17:28

## 2022-10-24 RX ADMIN — INSULIN LISPRO 1 UNITS: 100 INJECTION, SOLUTION INTRAVENOUS; SUBCUTANEOUS at 12:45

## 2022-10-24 RX ADMIN — FUROSEMIDE 40 MG: 40 TABLET ORAL at 08:21

## 2022-10-24 RX ADMIN — APIXABAN 5 MG: 5 TABLET, FILM COATED ORAL at 08:17

## 2022-10-24 RX ADMIN — UMECLIDINIUM 1 PUFF: 62.5 AEROSOL, POWDER ORAL at 08:25

## 2022-10-24 RX ADMIN — GUAIFENESIN 600 MG: 600 TABLET, EXTENDED RELEASE ORAL at 17:28

## 2022-10-24 RX ADMIN — FERROUS SULFATE TAB 325 MG (65 MG ELEMENTAL FE) 325 MG: 325 (65 FE) TAB at 08:17

## 2022-10-24 RX ADMIN — ASPIRIN 81 MG: 81 TABLET, COATED ORAL at 08:17

## 2022-10-24 RX ADMIN — PANTOPRAZOLE SODIUM 40 MG: 40 TABLET, DELAYED RELEASE ORAL at 17:28

## 2022-10-24 RX ADMIN — APIXABAN 5 MG: 5 TABLET, FILM COATED ORAL at 17:28

## 2022-10-24 RX ADMIN — GUAIFENESIN 600 MG: 600 TABLET, EXTENDED RELEASE ORAL at 08:17

## 2022-10-24 RX ADMIN — INSULIN LISPRO 15 UNITS: 100 INJECTION, SOLUTION INTRAVENOUS; SUBCUTANEOUS at 12:45

## 2022-10-24 RX ADMIN — PRAVASTATIN SODIUM 80 MG: 80 TABLET ORAL at 17:28

## 2022-10-24 RX ADMIN — ISOSORBIDE MONONITRATE 30 MG: 30 TABLET, EXTENDED RELEASE ORAL at 08:17

## 2022-10-24 RX ADMIN — TAMSULOSIN HYDROCHLORIDE 0.4 MG: 0.4 CAPSULE ORAL at 17:28

## 2022-10-24 RX ADMIN — LIDOCAINE 5% 1 PATCH: 700 PATCH TOPICAL at 08:17

## 2022-10-24 RX ADMIN — GABAPENTIN 100 MG: 100 CAPSULE ORAL at 08:17

## 2022-10-24 RX ADMIN — INSULIN LISPRO 15 UNITS: 100 INJECTION, SOLUTION INTRAVENOUS; SUBCUTANEOUS at 08:26

## 2022-10-24 RX ADMIN — CARVEDILOL 6.25 MG: 6.25 TABLET, FILM COATED ORAL at 08:21

## 2022-10-24 RX ADMIN — NICOTINE 7 MG: 7 PATCH, EXTENDED RELEASE TRANSDERMAL at 08:21

## 2022-10-24 RX ADMIN — FLUTICASONE FUROATE AND VILANTEROL TRIFENATATE 1 PUFF: 100; 25 POWDER RESPIRATORY (INHALATION) at 08:17

## 2022-10-24 RX ADMIN — PANTOPRAZOLE SODIUM 40 MG: 40 TABLET, DELAYED RELEASE ORAL at 06:19

## 2022-10-24 NOTE — ASSESSMENT & PLAN NOTE
Recent admission for COPD exacerbation, discharge on 10/15 with 5 days prednisone  Re-presents due to acute onset shortness of breath  On admission found to by hypoxic with respiratory acidosis  · Completed prednisone on 10/19  · Continue with Breo/Incruse 100/25/62 5 daily  · Guaifenesin for expectoration 600mg twice daily  · Pulmonary recommendations appreciated  Cleared from their end for discharge

## 2022-10-24 NOTE — CASE MANAGEMENT
Case Management Discharge Planning Note    Patient name Jose A Vasquez   [facility] [id number]  : 1945 Date 10/24/2022       Current Admission Date: 10/16/2022  Current Admission Diagnosis:Chronic diastolic (congestive) heart failure [facility])   Patient Active Problem List    Diagnosis Date Noted   • PUD (peptic ulcer disease) 10/20/2022   • ABLA (acute blood loss anemia) 10/18/2022   • A-fib ([facility] ) 10/10/2022   • Frequent hospital admissions 2022   • Medication management 2022   • Chest pain, musculoskeletal 2022   • Hypothermia 2022   • Epididymitis, bilateral 06/15/2022   • Chronic left-sided low back pain without sciatica 2022   • SOB (shortness of breath) 2022   • Stage 3b chronic kidney disease (CKD) ([facility] ) 2022   • History of prostate cancer 2022   • Adenocarcinoma of lung ([facility] ) 2022   • Fracture of navicular bone of left foot 2021   • PAD (peripheral artery disease) ([facility] ) 2021   • DDD (degenerative disc disease), lumbar 2020   • Anemia, iron deficiency 2020   • Lung nodule 2020   • Pulmonary hypertension ([facility] ) 2019   • JACQUELINE (obstructive sleep apnea) 2019   • COPD with acute exacerbation ([facility] ) 2019   • Oxygen dependent 2018   • Acute metabolic encephalopathy    • RBBB 2018   • Chronic diastolic (congestive) heart failure ([facility] ) 2018   • CAD (coronary artery disease) 2018   • Chronic diastolic heart failure ([facility] ) 2018   • Pleural effusion on right 10/31/2017   • COPD, severe ([address] ) 2017   • Diabetic neuropathy ([facility] [facility] ) 2017   • Essential hypertension 2016   • Venous stasis dermatitis of both lower extremities 11/15/2016   • Type 2 diabetes mellitus with hyperglycemia, with long-term current use of insulin ([facility] ) 2016   • COPD (chronic obstructive pulmonary disease) ([place] [address] ) 2016   • HLD (hyperlipidemia) 2016 LOS (days): 8  Geometric Mean LOS (GMLOS) (days): 3 50  Days to GMLOS:-4 1     OBJECTIVE:  Risk of Unplanned Readmission Score: 79 37         Current admission status: Inpatient   Preferred Pharmacy:   [address] [facility], [address] [place] [address] [address] [place] [address] [place] [address]  Phone: [phone] Fax: [phone] [facility] [place] [address] [facility] 38 210 [facility]  Phone: [phone] Fax: [phone]    Primary Care Provider: Yoandy Méndez MD    Primary Insurance: [facility]  Secondary Insurance:     DISCHARGE DETAILS:    [address]         Is the patient interested in [address] at discharge?: Yes  [address] requested[de-identified] Nursing, Occupational Therapy, Physical [address] Name[de-identified] Other ([address] [place] [address] Road)  [address] [place] [address] Provider[de-identified] PCP  Home Health Services Needed[de-identified] Strengthening/Theraputic Exercises to Improve Function, Evaluate Functional Status and Safety, Other (comment) (medication management)  Homebound Criteria Met[de-identified] Requires the Assistance of Another Person for Safe Ambulation or to Leave the Home, Uses an Assist Device (i e  cane, walker, etc)  Supporting Clincal Findings[de-identified] Limited Endurance, Fatigues Easliy in [employer]

## 2022-10-24 NOTE — PLAN OF CARE
Problem: Potential for Falls  Goal: Patient will remain free of falls  Description: INTERVENTIONS:  - Educate patient/family on patient safety including physical limitations  - Instruct patient to call for assistance with activity   - Consult OT/PT to assist with strengthening/mobility   - Keep Call bell within reach  - Keep bed low and locked with side rails adjusted as appropriate  - Keep care items and personal belongings within reach  - Initiate and maintain comfort rounds  - Make Fall Risk Sign visible to staff  - Offer Toileting every 2 Hours, in advance of need  - Initiate/Maintain tabs alarm  - Obtain necessary fall risk management equipment  - Apply yellow socks and bracelet for high fall risk patients  - Consider moving patient to room near nurses station  Outcome: Progressing     Problem: PAIN - ADULT  Goal: Verbalizes/displays adequate comfort level or baseline comfort level  Description: Interventions:  - Encourage patient to monitor pain and request assistance  - Assess pain using appropriate pain scale  - Administer analgesics based on type and severity of pain and evaluate response  - Implement non-pharmacological measures as appropriate and evaluate response  - Consider cultural and social influences on pain and pain management  - Notify physician/advanced practitioner if interventions unsuccessful or patient reports new pain  Outcome: Progressing     Problem: INFECTION - ADULT  Goal: Absence or prevention of progression during hospitalization  Description: INTERVENTIONS:  - Assess and monitor for signs and symptoms of infection  - Monitor lab/diagnostic results  - Monitor all insertion sites, i e  indwelling lines, tubes, and drains  - [place] appropriate cooling/warming therapies per order  - Administer medications as ordered  - Instruct and encourage patient and family to use good hand hygiene technique  - Identify and instruct in appropriate isolation precautions for identified infection/condition  Outcome: Progressing     Problem: RESPIRATORY - ADULT  Goal: Achieves optimal ventilation and oxygenation  Description: INTERVENTIONS:  - Assess for changes in respiratory status  - Assess for changes in mentation and behavior  - Position to facilitate oxygenation and minimize respiratory effort  - Oxygen administered by appropriate delivery if ordered  - Initiate smoking cessation education as indicated  - Encourage broncho-pulmonary hygiene including cough, deep breathe, Incentive Spirometry  - Assess the need for suctioning and aspirate as needed  - Assess and instruct to report SOB or any respiratory difficulty  - Respiratory Therapy support as indicated  Outcome: Progressing     Problem: DISCHARGE PLANNING  Goal: Discharge to home or other facility with appropriate resources  Description: INTERVENTIONS:  - Identify barriers to discharge w/patient and caregiver  - Arrange for needed discharge resources and transportation as appropriate  - Identify discharge learning needs (meds, wound care, etc )  - Arrange for interpretive services to assist at discharge as needed  - Refer to Case Management Department for coordinating discharge planning if the patient needs post-hospital services based on physician/advanced practitioner order or complex needs related to functional status, cognitive ability, or social support system  Outcome: Progressing

## 2022-10-24 NOTE — PHYSICAL THERAPY NOTE
Physical Therapy Progress Note     10/24/22 0923   PT Last Visit   PT Visit Date 10/24/22   Pain Assessment   Pain Score No Pain   Restrictions/Precautions   Other Precautions O2;Multiple lines   Subjective   Subjective Pt encountered seated in recliner, pleasant and agreeable to treatment  Reports no new complaints, but it was observed he had nose bleed upon entry to room, which he has had "for a couple days now "  humidifier placed on wall mounted O2 line during session  Transfers   Sit to Stand 5  Supervision   Additional items Assist x 1   Stand to Sit 5  Supervision   Additional items Assist x 1   Ambulation/Elevation   Gait pattern Short stride;Decreased foot clearance   Gait Assistance 5  Supervision   Additional items Assist x 1   Assistive Device Rolling walker   Distance 80' x 2   Balance   Static Sitting Fair +   Static Standing Fair   Ambulatory Fair   Activity Tolerance   Activity Tolerance Patient tolerated treatment well   Nurse Made Aware yes   Assessment   Prognosis Good   Problem List Decreased endurance; Impaired balance;Decreased mobility; Decreased cognition;Decreased safety awareness;Pain   Assessment Pt demonstrated improved endurance & mobility today, performing all transfers without need for assist & safely managing in hallways with use of RW today  Seated rest taken to recover prior to returning to room, but no significant dyspnea noted with activity  SpO2 remained stable >92% on pt's baseline 2L O2 nc during session  Discussed home set up & equipment, which pt feels safe using at this time  Pt would be appropriate for use of rollator for community mobiilty due to general endurance deficits that he reports are his baseline  Anticipate pt will be able to d/c home when medically cleared with family to resume support prn & follow up home PT services to follow up to progress pt to PLOF     Goals   Patient Goals to go home soon   STG Expiration Date 11/02/22   PT Treatment Day 1   Plan Treatment/Interventions Functional transfer training;LE strengthening/ROM; Therapeutic exercise; Endurance training;Patient/family training;Equipment eval/education; Bed mobility;Gait training   Progress Progressing toward goals   PT Frequency 2-3x/wk   Recommendation   PT Discharge Recommendation Home with home health rehabilitation   [address] [place] [address] Mobility Inpatient   Turning in Bed Without Bedrails 4   Lying on Back to Sitting on Edge of Flat Bed 4   Moving Bed to Chair 4   Standing Up From Chair 4   Walk in Room 4   Climb 3-5 Stairs 3   Basic Mobility Inpatient Raw Score 23   Basic Mobility Standardized Score 50 88   Highest Level Of Mobility   -HLM Goal 7: Walk 25 feet or more   JH-HLM Achieved 7: Walk 25 feet or more     [facility] Basic Mobility Standardized Score of 40 78 suggests pt would benefit from post acute rehab

## 2022-10-24 NOTE — PLAN OF CARE
Problem: PHYSICAL THERAPY ADULT  Goal: Performs mobility at highest level of function for planned discharge setting  See evaluation for individualized goals  Description: Treatment/Interventions: OT, Spoke to nursing          See flowsheet documentation for full assessment, interventions and recommendations  Outcome: Progressing  Note: Prognosis: Good  Problem List: Decreased endurance, Impaired balance, Decreased mobility, Decreased cognition, Decreased safety awareness, Pain  Assessment: Pt demonstrated improved endurance & mobility today, performing all transfers without need for assist & safely managing in hallways with use of RW today  Seated rest taken to recover prior to returning to room, but no significant dyspnea noted with activity  SpO2 remained stable >92% on pt's baseline 2L O2 nc during session  Discussed home set up & equipment, which pt feels safe using at this time  Pt would be appropriate for use of rollator for community mobiilty due to general endurance deficits that he reports are his baseline  Anticipate pt will be able to d/c home when medically cleared with family to resume support prn & follow up home PT services to follow up to progress pt to PLOF  Barriers to Discharge: Inaccessible home environment, Decreased caregiver support     PT Discharge Recommendation: Home with home health rehabilitation    See flowsheet documentation for full assessment

## 2022-10-24 NOTE — CASE MANAGEMENT
Case Management Discharge Planning Note    Patient name Marybeth Hook   [facility] [id number]  : 1945 Date 10/24/2022       Current Admission Date: 10/16/2022  Current Admission Diagnosis:Chronic diastolic (congestive) heart failure [facility])   Patient Active Problem List    Diagnosis Date Noted   • PUD (peptic ulcer disease) 10/20/2022   • ABLA (acute blood loss anemia) 10/18/2022   • A-fib ([facility] ) 10/10/2022   • Frequent hospital admissions 2022   • Medication management 2022   • Chest pain, musculoskeletal 2022   • Hypothermia 2022   • Epididymitis, bilateral 06/15/2022   • Chronic left-sided low back pain without sciatica 2022   • SOB (shortness of breath) 2022   • Stage 3b chronic kidney disease (CKD) ([facility] ) 2022   • History of prostate cancer 2022   • Adenocarcinoma of lung ([facility] ) 2022   • Fracture of navicular bone of left foot 2021   • PAD (peripheral artery disease) ([facility] ) 2021   • DDD (degenerative disc disease), lumbar 2020   • Anemia, iron deficiency 2020   • Lung nodule 2020   • Pulmonary hypertension ([facility] ) 2019   • JACQUELINE (obstructive sleep apnea) 2019   • [facility]ation [facility] ) 2019   • Oxygen dependent 2018   • Acute metabolic encephalopathy    • RBBB 2018   • Chronic diastolic (congestive) heart failure ([facility] ) 2018   • CAD (coronary artery disease) 2018   • Chronic diastolic heart failure ([facility] ) 2018   • Pleural effusion on right 10/31/2017   • COPD, severe ([facility] ) 2017   • Diabetic neuropathy ([facility] ) 2017   • Essential hypertension 2016   • Venous stasis dermatitis of both lower extremities 11/15/2016   • Type 2 diabetes mellitus with hyperglycemia, with long-term current use of insulin ([facility] ) 2016   • COPD (chronic obstructive pulmonary disease) ([place] [address] ) 2016   • HLD (hyperlipidemia) 2016 LOS (days): 8  Geometric Mean LOS (GMLOS) (days): 3 50  Days to GMLOS:-4 3     OBJECTIVE:  Risk of Unplanned Readmission Score: 79 42         Current admission status: Inpatient   Preferred Pharmacy:   382 [facility], [address] [place] [address] [address] [place] [address] [place] [address]  Phone: [phone] Fax: [phone] [facility] [address] [facility] [address] 38 210 [facility]  Phone: [phone] Fax: [phone]    Primary Care Provider: Jose Gloria MD    Primary Insurance: [facility]  Secondary Insurance:     DISCHARGE DETAILS:                                          Other Referral/Resources/Interventions Provided:  Financial Resources Provided: Indigent Transportation  Interventions: HHC  IMM Given (Date):: 10/24/22  IMM Given to[de-identified] Patient      Pt will d/c home today  Pt recommended for [facility]  Pt accepted by [facility]  Pt has no transportation home  CM secured a Lyft for him at 5930p  Lyft will call the pt's nurse @ [phone]  Pt will be taken to the B entrance upon d/c when the  calls  Pt's nurse notified

## 2022-10-24 NOTE — ASSESSMENT & PLAN NOTE
EGD revealed bleeding duodenal ulcer s/p APC  · PPI therapy  · Appreciate GI recommendations  Cleared from their end for discharge

## 2022-10-24 NOTE — TELEPHONE ENCOUNTER
----- Message from TRACY MEAD MD sent at 10/24/2022 11:26 AM EDT -----  Regarding: alicia  Please schedule the patient for hospital discharge follow-up for acute kidney injury to be seen in 3weeks  Please send the patient orders for renal function panel, magnesium, phosphorus prior to the visit and on 10/31/22  Patient usually follows up with Dr Stella Jones as an outpatient with, however was lost to follow-up since 2018      Thanks  veronica

## 2022-10-24 NOTE — ASSESSMENT & PLAN NOTE
Lab Results   Component Value Date    EGFR 24 10/24/2022    EGFR 25 10/23/2022    EGFR 28 10/22/2022    CREATININE 2 50 (H) 10/24/2022    CREATININE 2 39 (H) 10/23/2022    CREATININE 2 20 (H) 10/22/2022     Evaluated by nephrology on recent admission, recent baseline has been in the low 2s and was discharged with creatinine of 2 6  · Creatinine is currently stable  Cleared by nephrology for discharge  · Repeat BMP and outpatient follow-up to be coordinated by nephrology

## 2022-10-24 NOTE — QUICK NOTE
Patient stated his son was going to be picking him up at 7 pm tonight and that he would like this nurse to cancel his lyft  This nurse called [employer] at [phone] and cancelled patients ride at this time

## 2022-10-24 NOTE — PROGRESS NOTES
Progress Note - Bob Lemons Sr  78 y o  male MRN: [id number]    Unit/Bed#: PPHP 627-01 Encounter: [id number]      CC: diabetes f/u    Subjective: This is a 69 y o -year-old male with Type 2 diabetes on long-term insulin complicated by diabetic nephropathy and CAD admitting had  for COPD exacerbation, hospital course complicated with GI bleed s/p endoscopy, found to have duodenal ulcer  Feels well  No complaints  No hypoglycemia  Objective:     Vitals: Blood pressure (!) 118/43, pulse 65, temperature 97 9 °F (36 6 °C), resp  rate 16, height 6' (1 829 m), weight 95 8 kg (211 lb 3 2 oz), SpO2 99 %  ,Body mass index is 28 64 kg/m²  Intake/Output Summary (Last 24 hours) at 10/24/2022 1651  Last data filed at 10/24/2022 0739  Gross per 24 hour   Intake 720 ml   Output 850 ml   Net -130 ml       Physical Exam:  General Appearance: awake, appears stated age and cooperative  Head: Normocephalic, without obvious abnormality, atraumatic  Extremities: moves all extremities  Skin: Skin color and temperature normal    Pulm: no labored breathing    Lab, Imaging and other studies: I have personally reviewed pertinent reports        POC Glucose (mg/dl)   Date Value   10/24/2022 175 (H)   10/24/2022 116   10/23/2022 237 (H)   10/23/2022 284 (H)   10/23/2022 179 (H)   10/23/2022 171 (H)   10/23/2022 117   10/22/2022 368 (H)   10/22/2022 381 (H)   10/22/2022 440 (H)       Assessment and plan:    Type 2 Diabetes Mellitus  HbA1c: 9 7 (10/2022)  Home regimen:  40 units with breakfast and lunch, 35U with dinner    Current regimen: Lantus 40U, lispro 15U TID, correctional scale algo 3 with meals    Recommendations:  Reduce lantus to 32U from 40U at bedtime  C/w lispro 15U TID and algo 3 correctional scale with meals  If the patient were to get discharged, will recommend discharging on Lantus 32U and lispro 15U TID if the insurance covers it as U 500 is likely to be causing hypoglycemic episodes and pt's inpt insulin requirements are not very high      Portions of the record may have been created with voice recognition software

## 2022-10-24 NOTE — ASSESSMENT & PLAN NOTE
Presenting with complaints of SOB  At baseline uses 2 L NC as needed however was acutely hypoxic on admission requiring 3-4 L NC   · Pulmonology input appreciated, do not suspect recurrent COPD exacerbation  Completed short course of PO Prednisone on 10/19  · Resolved  Back to baseline  Continue home medications on discharge

## 2022-10-24 NOTE — DISCHARGE INSTRUCTIONS
Nephrology discharge recommendations:  - To follow up on your kidney function please get blood work upon discharge on or around 10/31/22  - You will receive a copy of the order form for the blood work in the mail from the office   - Nephrology office will contact you to set up a follow-up appointment in a few days/weeks  - In the interim if any issues from a kidney standpoint please contact the office at [phone]

## 2022-10-24 NOTE — ASSESSMENT & PLAN NOTE
Lab Results   Component Value Date    HGBA1C 9 7 (H) 10/10/2022       Recent Labs     10/23/22  1756 10/23/22  2041 10/24/22  0817 10/24/22  1234   POCGLU 284* 237* 116 175*       · Labile glucose levels  Cleared by endocrinology for discharged  Mixed regimen discontinued in favor of basal bolus regimen  Lantus 40U HS, Lispro 15U TID  Supplies sent

## 2022-10-24 NOTE — ASSESSMENT & PLAN NOTE
NM stress test in May 2022 unremarkable   · Troponin trend negative and EKG without ischemic changes   · Continue aspirin, statin, beta-blocker   · Cardiology input appreciated, do not recommend any additional ischemic work up at this time, added Imdur  Cleared for discharge from their end   They will arrange follow-up on discharge

## 2022-10-24 NOTE — ASSESSMENT & PLAN NOTE
Wt Readings from Last 3 Encounters:   10/23/22 95 8 kg (211 lb 3 2 oz)   10/15/22 98 7 kg (217 lb 11 1 oz)   09/17/22 101 kg (222 lb)     Euvolemic   · Most recent echo with LVEF 60%  · Continue home dose Coreg and Lasix

## 2022-10-24 NOTE — PROGRESS NOTES
Follow up Consultation    Nephrology   Erlin Jorge  78 y o  male MRN: [id number]  Unit/Bed#: [facility] 627-01 Encounter: [id number]      Physician Requesting Consult: Yudy Larios MD        ASSESSMENT/PLAN:  44-year-old male with multiple comorbidities including uncontrolled diabetes, COPD, history of prostate cancer, CAD status post PCI, CHF status post recent hospitalization with shortness of breath now admitted with again shortness of breath  Nephrology following for acute kidney injury  Acute kidney injury (POA) on CKD stage 3/4:  After review of records [facility] as well as Care everywhere it appears that the patient has a baseline Creatinine of 1 4-1 7 mg/dL  After recent hospitalization creatinine had leveled around 2 6-2 8 mg/dL  Likely some degree of CKD progression  Will need to wait a period of 3 months to see when new baseline creatinine will be   patient was admitted with a creatinine of 2 93 mg/dL on 10/16  patient's creatinine today is at 2 50 mg/dL, remaining stable  Severe azotemia thought to be secondary to GI bleed plus steroid usage plus some component of acute kidney injury however improving for now  check BMP in a m S still in the hospital   Await renal recovery  Optimize hemodynamic status to avoid delay in renal recovery  Place on a renal diet when allowed diet order  Avoid nephrotoxins, adjust meds to appropriate GFR  Strict I/O  Daily weights  Urinary retention protocol if patient does not have a Cook  Most likely has underlying CKD secondary to diabetic kidney disease plus hypertensive sclerosis plus age-related nephron loss plus cardiorenal syndrome  will need to set up patient for follow up with Nephrology as an outpatient post hospitalization    for nephrology as an outpatient patient follows up with Dr Gabriela Fontaine, however last seen 2018 and then lost to follow-up  Stable for discharge from renal standpoint medically cleared will need lab work upon discharge an outpatient follow-up in 2-3 weeks message sent to the office to arrange for outpatient follow-up  Blood pressure/hypertension:  current medications:  Coreg 6 25 mg p o  B i d  Lasix 40 mg p o  Q day, Imdur 30 mg p o  Q day  recommendations:   Optimize hemodynamics  Maintain MAP > 65mmHg  Avoid BP fluctuations  H/H/anemia:  most recent hemoglobin at 7 3 grams/deciliter  maintain hemoglobin greater than 8 grams/deciliter  Status post PRBC transfusion this hospitalization  Status post EGD showing duodenal ulcer  Treated with epi  Follow-up with GI  Continue p o  Iron    Acid-base electrolytes:    Electrolytes:      Hyponatremia:   Most recent sodium at 134 mEq  Stable and continue to monitor    Hyperkalemia:  Follow low-potassium diet  Resolves  Follow low-potassium diet  Most recent potassium 4 4 mEq  Acid-base:    Most recent bicarb at 21 stable    Other medical problems:  Diabetes:  Management per primary team   On insulin, most recent A1c 9 7% as of October 2022  Acute on chronic respiratory failure:  Management primary team follow-up with Pulmonary  On prednisone  CHF:  Management per primary team   Most recent echo with EF of 60%, for now continue Lasix 40 mg p o  Q day patient not interested in increasing dosage to help with volume status or changing over to Bumex  Follow-up with cardiology  Thanks for the consult  Will continue to follow  Please call with questions/ concerns  Above-mentioned orders and Plan in terms of acute kidney injury was discussed with the team in depth    Amy Lowery, 10/24/2022, 11:15 AM              Objective :   Patient seen and examined in his room no overnight events hemodynamically stable remains afebrile wants to go home urine output 1 3 L not wanting to increase diuretic dosage or changing over to Bumex to increase diuresis    Frustrated by being in the hospital       PHYSICAL EXAM  /53   Pulse 65   Temp 98 8 °F (37 1 °C)   Resp 18   Ht 6' (1 829 m)   Wt 95 8 kg (211 lb 3 2 oz)   SpO2 99%   BMI 28 64 kg/m²   Temp (24hrs), Av 7 °F (37 1 °C), Min:98 6 °F (37 °C), Max:98 8 °F (37 1 °C)        Intake/Output Summary (Last 24 hours) at 10/24/2022 1115  Last data filed at 10/24/2022 0739  Gross per 24 hour   Intake 1120 ml   Output 850 ml   Net 270 ml       I/O last 24 hours: In: 1360 [P O :1360]  Out: 1375 [Urine:1375]      Current Weight: Weight - Scale: 95 8 kg (211 lb 3 2 oz)  First Weight: Weight - Scale: 96 6 kg (213 lb)  Physical Exam  Vitals and nursing note reviewed  Constitutional:       General: He is not in acute distress  Appearance: Normal appearance  He is normal weight  He is not ill-appearing, toxic-appearing or diaphoretic  HENT:      Head: Normocephalic and atraumatic  Mouth/Throat:      Mouth: Mucous membranes are moist       Pharynx: Oropharynx is clear  No oropharyngeal exudate  Eyes:      General: No scleral icterus  Conjunctiva/sclera: Conjunctivae normal    Cardiovascular:      Rate and Rhythm: Normal rate  Heart sounds: Normal heart sounds  No friction rub  Pulmonary:      Effort: Pulmonary effort is normal  No respiratory distress  Breath sounds: Normal breath sounds  No stridor  No wheezing  Abdominal:      General: There is no distension  Palpations: Abdomen is soft  There is no mass  Tenderness: There is no abdominal tenderness  Musculoskeletal:         General: Swelling present  Cervical back: Normal range of motion and neck supple  No rigidity  Comments: Plus one edema bilateral extremities   Skin:     General: Skin is warm  Coloration: Skin is not jaundiced  Neurological:      General: No focal deficit present  Mental Status: He is alert and oriented to person, place, and time  Psychiatric:         Mood and Affect: Mood normal          Behavior: Behavior normal              Review of Systems   Constitutional: Negative for chills and fatigue  HENT: Negative for congestion  Respiratory: Negative for cough, shortness of breath and wheezing  Cardiovascular: Positive for leg swelling  Gastrointestinal: Negative for abdominal pain, diarrhea and vomiting  Genitourinary: Negative for dysuria  Musculoskeletal: Negative for back pain  Neurological: Negative for headaches  Psychiatric/Behavioral: Negative for agitation and confusion  All other systems reviewed and are negative        Scheduled Meds:  Current Facility-Administered Medications   Medication Dose Route Frequency Provider Last Rate   • acetaminophen  650 mg Oral Q6H PRN Yue Webster MD     • al mag oxide-diphenhydramine-lidocaine viscous  10 mL Swish & Swallow Q4H PRN Guilherme Rasheed MD     • albuterol  2 5 mg Nebulization Q4H PRN Consuelo Chan MD     • apixaban  5 mg Oral BID YOLANDE Fiore     • aspirin  81 mg Oral Daily Yue Webster MD     • carvedilol  6 25 mg Oral BID With Meals Charlotte Rock PA-C     • cyclobenzaprine  10 mg Oral BID PRN Yue Webster MD     • EPINEPHrine   Intravenous Code/Trauma/Sedation Med [employer], DO     • ferrous sulfate  325 mg Oral Daily With Breakfast Sebas Davidson MD     • fluticasone-vilanterol  1 puff Inhalation Daily Yue Webster MD     • furosemide  40 mg Oral Daily Joseph Adamson MD     • gabapentin  100 mg Oral TID Yue Webster MD     • guaiFENesin  600 mg Oral BID Yue Webster MD     • hydrALAZINE  5 mg Intravenous Q6H PRN Charlotte Rock PA-C     • insulin glargine  40 Units Subcutaneous HS Paco Guillen MD     • insulin lispro  1-6 Units Subcutaneous TID AC Yue Webster MD     • insulin lispro  15 Units Subcutaneous TID With Meals Paco Guillen MD     • isosorbide mononitrate  30 mg Oral Daily Staci Mccartney, DO     • lidocaine  1 patch Topical Daily Yue Webster MD     • melatonin  3 mg Oral HS YOLANDE Mendez     • mupirocin Topical BID Panda Gresham MD     • nicotine  7 mg Transdermal Daily Manuel Rodriguez MD     • pantoprazole  40 mg Oral BID AC YOLANDE Fiore     • pravastatin  80 mg Oral Daily With Danyel Chamorro MD     • sodium chloride  1 spray Each Nare 4x Daily Panda Gresham MD     • tamsulosin  0 4 mg Oral Daily With Danyel Chamorro MD     • umeclidinium bromide  1 puff Inhalation Daily Manuel Rodriguez MD         PRN Meds: •  acetaminophen  •  al mag oxide-diphenhydramine-lidocaine viscous  •  albuterol  •  cyclobenzaprine  •  EPINEPHrine  •  hydrALAZINE    Continuous Infusions:       Invasive Devices:      Invasive Devices  Report    Peripheral Intravenous Line  Duration           Peripheral IV 10/20/22 Right Antecubital 3 days                  LABORATORY:    Results from last 7 days   Lab Units 10/24/22  0559 10/23/22  0457 10/22/22  1802 10/22/22  0908 10/22/22  4280 10/22/22  0032 10/21/22  1654 10/21/22  2514 10/20/22  1632 10/20/22  1056 10/20/22  0431 10/19/22  2335 10/19/22  0714 10/19/22  0531   WBC Thousand/uL 8 02 8 87  --   --  9 91  --   --  12 33*  --  9 86 7 75 8 95   < >  --    HEMOGLOBIN g/dL 7 3* 7 8* 7 9* 8 3* 7 4* 7 5* 6 2* 7 3*   < > 6 6* 5 8* 6 4*   < >  --    HEMATOCRIT % 23 1* 24 4* 24 2* 25 7* 22 6* 22 1* 19 1* 22 3*   < > 20 8* 18 1* 20 6*   < >  --    PLATELETS Thousands/uL 89* 98*  --   --  88*  --   --  110*  --  117* 116* 148*   < >  --    POTASSIUM mmol/L 4 4 3 9  --   --  3 9  --   --  4 7  --   --  4 3 5 3  --  4 6   CHLORIDE mmol/L 106 104  --   --  106  --   --  105  --   --  104 100  --  105   CO2 mmol/L 21 30  --   --  29  --   --  29  --   --  30 30  --  26   BUN mg/dL 58* 67*  --   --  72*  --   --  91*  --   --  110* 106*  --  100*   CREATININE mg/dL 2 50* 2 39*  --   --  2 20*  --   --  2 44*  --   --  2 64* 2 74*  --  2 72*   CALCIUM mg/dL 7 6* 7 8*  --   --  7 9*  --   --  7 9*  --   --  7 6* 7 7*  --  8 0*    < > = values in this interval not displayed  rest all reviewed    RADIOLOGY:  XR chest 1 view portable   Final Result by Devra Oppenheim, MD (10/17 0845)      Stable appearance of volume loss and chronic opacities in the right                  Workstation performed: [id number]           Rest all reviewed    Portions of the record may have been created with voice recognition software  Occasional wrong word or "sound a like" substitutions may have occurred due to the inherent limitations of voice recognition software  Read the chart carefully and recognize, using context, where substitutions have occurred  If you have any questions, please contact the dictating provider

## 2022-10-25 NOTE — UTILIZATION REVIEW
NOTIFICATION OF ADMISSION DISCHARGE   This is a Notification of Discharge from [address]  Please be advised that this patient has been discharge from our facility  Below you will find the admission and discharge date and time including the patient’s disposition  UTILIZATION REVIEW CONTACT:  Mary Mckenzie  Utilization   Network Utilization Review Department  Phone: [phone] x carefully listen to the prompts  All voicemails are confidential   Email: [email or web address]     ADMISSION INFORMATION  PRESENTATION DATE: 10/16/2022  8:22 PM  OBERVATION ADMISSION DATE:   INPATIENT ADMISSION DATE: 10/16/22 10:20 PM   DISCHARGE DATE: 10/24/2022  7:04 PM  DISPOSITION: Home with [place] with [address] INFORMATION:  Send all requests for admission clinical reviews, approved or denied determinations and any other requests to dedicated fax number below belonging to the campus where the patient is receiving treatment   List of dedicated fax numbers:  [address] DENIALS (Administrative/Medical Necessity) [phone]   [address] (Maternity/NICU/Pediatrics) [phone]   [facility] [phone]   [place] [address] [phone]   [address]  [phone]   [address] [facility] [phone] [address] [facility] [phone]   [address] [place] [address] 119 [phone]   [facility]  [phone] [address] [facility] [phone]   [address] [facility] [address] [facility] [phone]

## 2022-10-26 ENCOUNTER — APPOINTMENT (INPATIENT)
Dept: RADIOLOGY | Facility: HOSPITAL | Age: 77
End: 2022-10-26

## 2022-10-26 ENCOUNTER — APPOINTMENT (EMERGENCY)
Dept: RADIOLOGY | Facility: HOSPITAL | Age: 77
End: 2022-10-26

## 2022-10-26 ENCOUNTER — HOSPITAL ENCOUNTER (INPATIENT)
Facility: HOSPITAL | Age: 77
LOS: 7 days | Discharge: HOME HEALTH | End: 2022-11-02
Attending: EMERGENCY MEDICINE | Admitting: INTERNAL MEDICINE

## 2022-10-26 DIAGNOSIS — R60.0 LOWER EXTREMITY EDEMA: ICD-10-CM

## 2022-10-26 DIAGNOSIS — J96.00 ACUTE RESPIRATORY FAILURE (HCC): Primary | ICD-10-CM

## 2022-10-26 DIAGNOSIS — E11.65 TYPE 2 DIABETES MELLITUS WITH HYPERGLYCEMIA, WITH LONG-TERM CURRENT USE OF INSULIN (HCC): ICD-10-CM

## 2022-10-26 DIAGNOSIS — J90 PLEURAL EFFUSION: ICD-10-CM

## 2022-10-26 DIAGNOSIS — I50.9 CHF (CONGESTIVE HEART FAILURE) (HCC): ICD-10-CM

## 2022-10-26 DIAGNOSIS — J44.1 COPD EXACERBATION (HCC): ICD-10-CM

## 2022-10-26 DIAGNOSIS — Z79.4 TYPE 2 DIABETES MELLITUS WITH HYPERGLYCEMIA, WITH LONG-TERM CURRENT USE OF INSULIN (HCC): ICD-10-CM

## 2022-10-26 DIAGNOSIS — Z01.89 ENCOUNTER FOR ASSESSMENT OF DECISION-MAKING CAPACITY: ICD-10-CM

## 2022-10-26 DIAGNOSIS — D62 ABLA (ACUTE BLOOD LOSS ANEMIA): ICD-10-CM

## 2022-10-26 DIAGNOSIS — R06.89 HYPERCAPNIA: ICD-10-CM

## 2022-10-26 LAB
2HR DELTA HS TROPONIN: 14 NG/L
4HR DELTA HS TROPONIN: 21 NG/L
ALBUMIN SERPL BCP-MCNC: 2.2 G/DL (ref 3.5–5)
ALP SERPL-CCNC: 100 U/L (ref 46–116)
ALT SERPL W P-5'-P-CCNC: 30 U/L (ref 12–78)
ANION GAP SERPL CALCULATED.3IONS-SCNC: 6 MMOL/L (ref 4–13)
ARTERIAL PATENCY WRIST A: YES
AST SERPL W P-5'-P-CCNC: 20 U/L (ref 5–45)
ATRIAL RATE: 100 BPM
ATRIAL RATE: 102 BPM
ATRIAL RATE: 105 BPM
ATRIAL RATE: 65 BPM
ATRIAL RATE: 93 BPM
BACTERIA UR QL AUTO: ABNORMAL /HPF
BASE EX.OXY STD BLDV CALC-SCNC: 82.3 % (ref 60–80)
BASE EX.OXY STD BLDV CALC-SCNC: 95.3 % (ref 60–80)
BASE EXCESS BLDA CALC-SCNC: -0.1 MMOL/L
BASE EXCESS BLDA CALC-SCNC: 0.8 MMOL/L
BASE EXCESS BLDA CALC-SCNC: 2 MMOL/L (ref -2–3)
BASE EXCESS BLDV CALC-SCNC: 1.7 MMOL/L
BASE EXCESS BLDV CALC-SCNC: 3.3 MMOL/L
BASOPHILS # BLD AUTO: 0.01 THOUSANDS/ÂΜL (ref 0–0.1)
BASOPHILS NFR BLD AUTO: 0 % (ref 0–1)
BETA-HYDROXYBUTYRATE: 0.1 MMOL/L
BILIRUB SERPL-MCNC: 0.29 MG/DL (ref 0.2–1)
BILIRUB UR QL STRIP: NEGATIVE
BUN SERPL-MCNC: 49 MG/DL (ref 5–25)
CA-I BLD-SCNC: 1.08 MMOL/L (ref 1.12–1.32)
CALCIUM ALBUM COR SERPL-MCNC: 9.5 MG/DL (ref 8.3–10.1)
CALCIUM SERPL-MCNC: 8.1 MG/DL (ref 8.3–10.1)
CARDIAC TROPONIN I PNL SERPL HS: 35 NG/L
CARDIAC TROPONIN I PNL SERPL HS: 49 NG/L
CARDIAC TROPONIN I PNL SERPL HS: 56 NG/L
CHLORIDE SERPL-SCNC: 103 MMOL/L (ref 96–108)
CLARITY UR: ABNORMAL
CO2 SERPL-SCNC: 27 MMOL/L (ref 21–32)
COLOR UR: COLORLESS
CREAT SERPL-MCNC: 2.52 MG/DL (ref 0.6–1.3)
EOSINOPHIL # BLD AUTO: 0.02 THOUSAND/ÂΜL (ref 0–0.61)
EOSINOPHIL NFR BLD AUTO: 0 % (ref 0–6)
ERYTHROCYTE [DISTWIDTH] IN BLOOD BY AUTOMATED COUNT: 15.6 % (ref 11.6–15.1)
FLUAV RNA RESP QL NAA+PROBE: NEGATIVE
FLUBV RNA RESP QL NAA+PROBE: NEGATIVE
GFR SERPL CREATININE-BSD FRML MDRD: 23 ML/MIN/1.73SQ M
GLUCOSE SERPL-MCNC: 151 MG/DL (ref 65–140)
GLUCOSE SERPL-MCNC: 257 MG/DL (ref 65–140)
GLUCOSE SERPL-MCNC: 424 MG/DL (ref 65–140)
GLUCOSE SERPL-MCNC: 486 MG/DL (ref 65–140)
GLUCOSE SERPL-MCNC: 508 MG/DL (ref 65–140)
GLUCOSE SERPL-MCNC: 530 MG/DL (ref 65–140)
GLUCOSE UR STRIP-MCNC: ABNORMAL MG/DL
HCO3 BLDA-SCNC: 24.8 MMOL/L (ref 22–28)
HCO3 BLDA-SCNC: 27.7 MMOL/L (ref 22–28)
HCO3 BLDA-SCNC: 29.5 MMOL/L (ref 24–30)
HCO3 BLDV-SCNC: 30.8 MMOL/L (ref 24–30)
HCO3 BLDV-SCNC: 31.8 MMOL/L (ref 24–30)
HCT VFR BLD AUTO: 25.6 % (ref 36.5–49.3)
HCT VFR BLD CALC: 23 % (ref 36.5–49.3)
HGB BLD-MCNC: 7.9 G/DL (ref 12–17)
HGB BLDA-MCNC: 7.8 G/DL (ref 12–17)
HGB UR QL STRIP.AUTO: ABNORMAL
HOROWITZ INDEX BLDA+IHG-RTO: 40 MM[HG]
HOROWITZ INDEX BLDA+IHG-RTO: 40 MM[HG]
HYALINE CASTS #/AREA URNS LPF: ABNORMAL /LPF
IMM GRANULOCYTES # BLD AUTO: 0.05 THOUSAND/UL (ref 0–0.2)
IMM GRANULOCYTES NFR BLD AUTO: 1 % (ref 0–2)
KETONES UR STRIP-MCNC: NEGATIVE MG/DL
L PNEUMO1 AG UR QL IA.RAPID: NEGATIVE
LACTATE SERPL-SCNC: 1.3 MMOL/L (ref 0.5–2)
LACTATE SERPL-SCNC: 3.5 MMOL/L (ref 0.5–2)
LEUKOCYTE ESTERASE UR QL STRIP: ABNORMAL
LYMPHOCYTES # BLD AUTO: 0.59 THOUSANDS/ÂΜL (ref 0.6–4.47)
LYMPHOCYTES NFR BLD AUTO: 7 % (ref 14–44)
MCH RBC QN AUTO: 28.1 PG (ref 26.8–34.3)
MCHC RBC AUTO-ENTMCNC: 30.9 G/DL (ref 31.4–37.4)
MCV RBC AUTO: 91 FL (ref 82–98)
MONOCYTES # BLD AUTO: 0.53 THOUSAND/ÂΜL (ref 0.17–1.22)
MONOCYTES NFR BLD AUTO: 6 % (ref 4–12)
NEUTROPHILS # BLD AUTO: 7.27 THOUSANDS/ÂΜL (ref 1.85–7.62)
NEUTS SEG NFR BLD AUTO: 86 % (ref 43–75)
NITRITE UR QL STRIP: NEGATIVE
NON-SQ EPI CELLS URNS QL MICRO: ABNORMAL /HPF
NRBC BLD AUTO-RTO: 0 /100 WBCS
NT-PROBNP SERPL-MCNC: 6159 PG/ML
O2 CT BLDA-SCNC: 11.1 ML/DL (ref 16–23)
O2 CT BLDA-SCNC: 11.1 ML/DL (ref 16–23)
O2 CT BLDV-SCNC: 12.2 ML/DL
O2 CT BLDV-SCNC: 9.7 ML/DL
OXYHGB MFR BLDA: 96.3 % (ref 94–97)
OXYHGB MFR BLDA: 96.8 % (ref 94–97)
P AXIS: -9 DEGREES
PCO2 BLD: 32 MMOL/L (ref 21–32)
PCO2 BLD: 67.3 MM HG (ref 42–50)
PCO2 BLDA: 41.6 MM HG (ref 36–44)
PCO2 BLDA: 58.7 MM HG (ref 36–44)
PCO2 BLDV: 76.9 MM HG (ref 42–50)
PCO2 BLDV: 80.3 MM HG (ref 42–50)
PEEP RESPIRATORY: 6 CM[H2O]
PEEP RESPIRATORY: 6 CM[H2O]
PH BLD: 7.25 [PH] (ref 7.3–7.4)
PH BLDA: 7.29 [PH] (ref 7.35–7.45)
PH BLDA: 7.39 [PH] (ref 7.35–7.45)
PH BLDV: 7.2 [PH] (ref 7.3–7.4)
PH BLDV: 7.23 [PH] (ref 7.3–7.4)
PH UR STRIP.AUTO: 5 [PH]
PLATELET # BLD AUTO: 127 THOUSANDS/UL (ref 149–390)
PMV BLD AUTO: 10.6 FL (ref 8.9–12.7)
PO2 BLD: 60 MM HG (ref 35–45)
PO2 BLDA: 135 MM HG (ref 75–129)
PO2 BLDA: 140 MM HG (ref 75–129)
PO2 BLDV: 140.3 MM HG (ref 35–45)
PO2 BLDV: 55.8 MM HG (ref 35–45)
POTASSIUM BLD-SCNC: 5.8 MMOL/L (ref 3.5–5.3)
POTASSIUM SERPL-SCNC: 5.5 MMOL/L (ref 3.5–5.3)
PR INTERVAL: 216 MS
PR INTERVAL: 70 MS
PROCALCITONIN SERPL-MCNC: 0.21 NG/ML
PROT SERPL-MCNC: 6.5 G/DL (ref 6.4–8.4)
PROT UR STRIP-MCNC: ABNORMAL MG/DL
QRS AXIS: 204 DEGREES
QRS AXIS: 205 DEGREES
QRS AXIS: 206 DEGREES
QRS AXIS: 68 DEGREES
QRS AXIS: 82 DEGREES
QRSD INTERVAL: 132 MS
QRSD INTERVAL: 138 MS
QRSD INTERVAL: 140 MS
QRSD INTERVAL: 150 MS
QRSD INTERVAL: 150 MS
QT INTERVAL: 378 MS
QT INTERVAL: 382 MS
QT INTERVAL: 388 MS
QT INTERVAL: 416 MS
QT INTERVAL: 450 MS
QTC INTERVAL: 468 MS
QTC INTERVAL: 485 MS
QTC INTERVAL: 490 MS
QTC INTERVAL: 497 MS
QTC INTERVAL: 497 MS
RBC # BLD AUTO: 2.81 MILLION/UL (ref 3.88–5.62)
RBC #/AREA URNS AUTO: ABNORMAL /HPF
RSV RNA RESP QL NAA+PROBE: NEGATIVE
S PNEUM AG UR QL: NEGATIVE
SAO2 % BLD FROM PO2: 85 % (ref 60–85)
SARS-COV-2 RNA RESP QL NAA+PROBE: NEGATIVE
SODIUM BLD-SCNC: 136 MMOL/L (ref 136–145)
SODIUM SERPL-SCNC: 136 MMOL/L (ref 135–147)
SP GR UR STRIP.AUTO: 1.01 (ref 1–1.03)
SPECIMEN SOURCE: ABNORMAL
T WAVE AXIS: 17 DEGREES
T WAVE AXIS: 31 DEGREES
T WAVE AXIS: 38 DEGREES
T WAVE AXIS: 39 DEGREES
T WAVE AXIS: 40 DEGREES
UROBILINOGEN UR STRIP-ACNC: <2 MG/DL
VENT AC: 20
VENT AC: 22
VENT- AC: AC
VENT- AC: AC
VENTRICULAR RATE: 65 BPM
VENTRICULAR RATE: 86 BPM
VENTRICULAR RATE: 99 BPM
VT SETTING VENT: 450 ML
VT SETTING VENT: 500 ML
WBC # BLD AUTO: 8.47 THOUSAND/UL (ref 4.31–10.16)
WBC #/AREA URNS AUTO: ABNORMAL /HPF
WBC CLUMPS # UR AUTO: PRESENT /UL

## 2022-10-26 PROCEDURE — 0BH17EZ INSERTION OF ENDOTRACHEAL AIRWAY INTO TRACHEA, VIA NATURAL OR ARTIFICIAL OPENING: ICD-10-PCS | Performed by: FAMILY MEDICINE

## 2022-10-26 PROCEDURE — 5A1935Z RESPIRATORY VENTILATION, LESS THAN 24 CONSECUTIVE HOURS: ICD-10-PCS | Performed by: FAMILY MEDICINE

## 2022-10-26 RX ORDER — FUROSEMIDE 10 MG/ML
40 INJECTION INTRAMUSCULAR; INTRAVENOUS ONCE
Status: COMPLETED | OUTPATIENT
Start: 2022-10-26 | End: 2022-10-26

## 2022-10-26 RX ORDER — FENTANYL CITRATE 50 UG/ML
50 INJECTION, SOLUTION INTRAMUSCULAR; INTRAVENOUS EVERY 2 HOUR PRN
Status: DISCONTINUED | OUTPATIENT
Start: 2022-10-26 | End: 2022-10-27

## 2022-10-26 RX ORDER — METHYLPREDNISOLONE SODIUM SUCCINATE 125 MG/2ML
125 INJECTION, POWDER, LYOPHILIZED, FOR SOLUTION INTRAMUSCULAR; INTRAVENOUS ONCE
Status: CANCELLED | OUTPATIENT
Start: 2022-10-26 | End: 2022-10-26

## 2022-10-26 RX ORDER — CHLORHEXIDINE GLUCONATE 0.12 MG/ML
15 RINSE ORAL EVERY 12 HOURS SCHEDULED
Status: DISCONTINUED | OUTPATIENT
Start: 2022-10-26 | End: 2022-11-02 | Stop reason: HOSPADM

## 2022-10-26 RX ORDER — ALBUTEROL SULFATE 2.5 MG/3ML
2.5 SOLUTION RESPIRATORY (INHALATION) ONCE
Status: COMPLETED | OUTPATIENT
Start: 2022-10-26 | End: 2022-10-26

## 2022-10-26 RX ORDER — PANTOPRAZOLE SODIUM 40 MG/10ML
40 INJECTION, POWDER, LYOPHILIZED, FOR SOLUTION INTRAVENOUS EVERY 12 HOURS SCHEDULED
Status: DISCONTINUED | OUTPATIENT
Start: 2022-10-26 | End: 2022-10-27

## 2022-10-26 RX ORDER — FENTANYL CITRATE 50 UG/ML
50 INJECTION, SOLUTION INTRAMUSCULAR; INTRAVENOUS ONCE
Status: COMPLETED | OUTPATIENT
Start: 2022-10-26 | End: 2022-10-26

## 2022-10-26 RX ORDER — LEVALBUTEROL 1.25 MG/.5ML
1.25 SOLUTION, CONCENTRATE RESPIRATORY (INHALATION)
Status: DISCONTINUED | OUTPATIENT
Start: 2022-10-26 | End: 2022-11-02 | Stop reason: HOSPADM

## 2022-10-26 RX ORDER — DEXMEDETOMIDINE HYDROCHLORIDE 4 UG/ML
.1-.7 INJECTION, SOLUTION INTRAVENOUS
Status: DISCONTINUED | OUTPATIENT
Start: 2022-10-26 | End: 2022-10-28

## 2022-10-26 RX ORDER — METHYLPREDNISOLONE SODIUM SUCCINATE 125 MG/2ML
125 INJECTION, POWDER, LYOPHILIZED, FOR SOLUTION INTRAMUSCULAR; INTRAVENOUS ONCE
Status: COMPLETED | OUTPATIENT
Start: 2022-10-26 | End: 2022-10-26

## 2022-10-26 RX ORDER — FORMOTEROL FUMARATE 20 UG/2ML
20 SOLUTION RESPIRATORY (INHALATION)
Status: DISCONTINUED | OUTPATIENT
Start: 2022-10-26 | End: 2022-11-02 | Stop reason: HOSPADM

## 2022-10-26 RX ORDER — IPRATROPIUM BROMIDE AND ALBUTEROL SULFATE 2.5; .5 MG/3ML; MG/3ML
SOLUTION RESPIRATORY (INHALATION)
Status: DISPENSED
Start: 2022-10-26 | End: 2022-10-27

## 2022-10-26 RX ORDER — SODIUM CHLORIDE FOR INHALATION 0.9 %
3 VIAL, NEBULIZER (ML) INHALATION ONCE
Status: COMPLETED | OUTPATIENT
Start: 2022-10-26 | End: 2022-10-26

## 2022-10-26 RX ORDER — GLYCOPYRROLATE 0.2 MG/ML
0.4 INJECTION INTRAMUSCULAR; INTRAVENOUS ONCE
Status: COMPLETED | OUTPATIENT
Start: 2022-10-26 | End: 2022-10-26

## 2022-10-26 RX ORDER — NITROGLYCERIN 20 MG/100ML
INJECTION INTRAVENOUS
Status: COMPLETED
Start: 2022-10-26 | End: 2022-10-26

## 2022-10-26 RX ORDER — CALCIUM GLUCONATE 20 MG/ML
2 INJECTION, SOLUTION INTRAVENOUS ONCE
Status: COMPLETED | OUTPATIENT
Start: 2022-10-26 | End: 2022-10-26

## 2022-10-26 RX ORDER — MAGNESIUM SULFATE HEPTAHYDRATE 40 MG/ML
2 INJECTION, SOLUTION INTRAVENOUS ONCE
Status: COMPLETED | OUTPATIENT
Start: 2022-10-26 | End: 2022-10-26

## 2022-10-26 RX ORDER — SODIUM CHLORIDE FOR INHALATION 0.9 %
3 VIAL, NEBULIZER (ML) INHALATION ONCE
Status: DISCONTINUED | OUTPATIENT
Start: 2022-10-26 | End: 2022-10-27

## 2022-10-26 RX ORDER — NITROGLYCERIN 20 MG/100ML
5-200 INJECTION INTRAVENOUS
Status: DISCONTINUED | OUTPATIENT
Start: 2022-10-26 | End: 2022-10-26

## 2022-10-26 RX ORDER — SUCCINYLCHOLINE/SOD CL,ISO/PF 100 MG/5ML
150 SYRINGE (ML) INTRAVENOUS ONCE
Status: COMPLETED | OUTPATIENT
Start: 2022-10-26 | End: 2022-10-26

## 2022-10-26 RX ORDER — BUDESONIDE 0.5 MG/2ML
0.5 INHALANT ORAL
Status: DISCONTINUED | OUTPATIENT
Start: 2022-10-26 | End: 2022-11-02 | Stop reason: HOSPADM

## 2022-10-26 RX ORDER — FENTANYL CITRATE-0.9 % NACL/PF 10 MCG/ML
75 PLASTIC BAG, INJECTION (ML) INTRAVENOUS CONTINUOUS
Status: DISCONTINUED | OUTPATIENT
Start: 2022-10-26 | End: 2022-10-28

## 2022-10-26 RX ORDER — PROPOFOL 10 MG/ML
5-50 INJECTION, EMULSION INTRAVENOUS
Status: DISCONTINUED | OUTPATIENT
Start: 2022-10-26 | End: 2022-10-28

## 2022-10-26 RX ORDER — ETOMIDATE 2 MG/ML
30 INJECTION INTRAVENOUS ONCE
Status: COMPLETED | OUTPATIENT
Start: 2022-10-26 | End: 2022-10-26

## 2022-10-26 RX ORDER — NITROGLYCERIN 20 MG/100ML
28.7 INJECTION INTRAVENOUS
Status: DISCONTINUED | OUTPATIENT
Start: 2022-10-26 | End: 2022-10-26

## 2022-10-26 RX ORDER — ALBUTEROL SULFATE 2.5 MG/3ML
SOLUTION RESPIRATORY (INHALATION)
Status: COMPLETED
Start: 2022-10-26 | End: 2022-10-26

## 2022-10-26 RX ADMIN — DEXMEDETOMIDINE HYDROCHLORIDE 0.3 MCG/KG/HR: 400 INJECTION INTRAVENOUS at 15:44

## 2022-10-26 RX ADMIN — BUDESONIDE 0.5 MG: 0.5 INHALANT ORAL at 19:22

## 2022-10-26 RX ADMIN — IPRATROPIUM BROMIDE 0.5 MG: 0.5 SOLUTION RESPIRATORY (INHALATION) at 19:23

## 2022-10-26 RX ADMIN — PROPOFOL 10 MCG/KG/MIN: 10 INJECTION, EMULSION INTRAVENOUS at 13:04

## 2022-10-26 RX ADMIN — MAGNESIUM SULFATE HEPTAHYDRATE 2 G: 40 INJECTION, SOLUTION INTRAVENOUS at 12:43

## 2022-10-26 RX ADMIN — ALBUTEROL SULFATE 2.5 MG: 2.5 SOLUTION RESPIRATORY (INHALATION) at 12:13

## 2022-10-26 RX ADMIN — CEFEPIME 2000 MG: 2 INJECTION, POWDER, FOR SOLUTION INTRAVENOUS at 17:14

## 2022-10-26 RX ADMIN — SODIUM CHLORIDE 10 UNITS/HR: 900 INJECTION, SOLUTION INTRAVENOUS at 16:45

## 2022-10-26 RX ADMIN — NITROGLYCERIN 28.7 MCG/MIN: 20 INJECTION INTRAVENOUS at 12:15

## 2022-10-26 RX ADMIN — LEVALBUTEROL HYDROCHLORIDE 1.25 MG: 1.25 SOLUTION, CONCENTRATE RESPIRATORY (INHALATION) at 19:23

## 2022-10-26 RX ADMIN — FENTANYL CITRATE 75 MCG/HR: 0.05 INJECTION, SOLUTION INTRAMUSCULAR; INTRAVENOUS at 15:49

## 2022-10-26 RX ADMIN — SODIUM CHLORIDE 6 UNITS/HR: 9 INJECTION, SOLUTION INTRAVENOUS at 22:48

## 2022-10-26 RX ADMIN — METHYLPREDNISOLONE SODIUM SUCCINATE 125 MG: 125 INJECTION, POWDER, FOR SOLUTION INTRAMUSCULAR; INTRAVENOUS at 12:44

## 2022-10-26 RX ADMIN — FORMOTEROL FUMARATE DIHYDRATE 20 MCG: 20 SOLUTION RESPIRATORY (INHALATION) at 19:22

## 2022-10-26 RX ADMIN — ALBUTEROL SULFATE 10 MG: 2.5 SOLUTION RESPIRATORY (INHALATION) at 12:56

## 2022-10-26 RX ADMIN — Medication 150 MG: at 12:59

## 2022-10-26 RX ADMIN — ISODIUM CHLORIDE 3 ML: 0.03 SOLUTION RESPIRATORY (INHALATION) at 12:57

## 2022-10-26 RX ADMIN — DEXMEDETOMIDINE HYDROCHLORIDE 0.2 MCG/KG/HR: 400 INJECTION INTRAVENOUS at 22:49

## 2022-10-26 RX ADMIN — FENTANYL CITRATE 50 MCG: 0.05 INJECTION, SOLUTION INTRAMUSCULAR; INTRAVENOUS at 14:02

## 2022-10-26 RX ADMIN — PANTOPRAZOLE SODIUM 40 MG: 40 INJECTION, POWDER, FOR SOLUTION INTRAVENOUS at 20:09

## 2022-10-26 RX ADMIN — CHLORHEXIDINE GLUCONATE 15 ML: 1.2 SOLUTION ORAL at 20:59

## 2022-10-26 RX ADMIN — FUROSEMIDE 40 MG: 10 INJECTION, SOLUTION INTRAMUSCULAR; INTRAVENOUS at 12:23

## 2022-10-26 RX ADMIN — ETOMIDATE 30 MG: 2 INJECTION INTRAVENOUS at 12:58

## 2022-10-26 RX ADMIN — IPRATROPIUM BROMIDE 2 MG: 0.5 SOLUTION RESPIRATORY (INHALATION) at 12:57

## 2022-10-26 RX ADMIN — GLYCOPYRROLATE 0.4 MG: 0.2 INJECTION INTRAMUSCULAR; INTRAVENOUS at 22:46

## 2022-10-26 RX ADMIN — NITROGLYCERIN 28.7 MCG/MIN: 20 INJECTION INTRAVENOUS at 12:20

## 2022-10-26 RX ADMIN — CALCIUM GLUCONATE 2 G: 20 INJECTION, SOLUTION INTRAVENOUS at 14:24

## 2022-10-26 RX ADMIN — VANCOMYCIN HYDROCHLORIDE 1500 MG: 10 INJECTION, POWDER, LYOPHILIZED, FOR SOLUTION INTRAVENOUS at 17:59

## 2022-10-26 RX ADMIN — SODIUM CHLORIDE 15 UNITS/HR: 9 INJECTION, SOLUTION INTRAVENOUS at 17:07

## 2022-10-26 NOTE — ED NOTES
Nitro on hold at this time, preparing for intubation Per Dr Yinka Reveles, RN  10/26/22 1542 PROSPER Mathur RN  10/26/22 9827

## 2022-10-26 NOTE — ED PROVIDER NOTES
History  Chief Complaint   Patient presents with   • Respiratory Distress     Pt came EMS from home with resp distress, d/c yesterday for same dx  C/o chest pain, 1x nitro EMS     HPI  77-year-old male with past medical history of CHF, COPD presents in respiratory distress  Patient states that he started becoming progressively more dyspneic for the last 3 days gotten worse  EMS was called and patient received 1 dose of nitro in the ambulance  Patient denies any chest pain a nausea, vomiting, fevers, chills  Patient refuses to use BiPAP machine or CPAP machine due to not wanting to wear a mask over his face  Patient has increased work of breathing and would benefit from BiPAP or CPAP however he sweats with mask when tried to put on and patient is asked multiple times during his time in the ED to be placed on BiPAP however he refuses every time  Patient states that he would rather be intubated than wear a BiPAP mask  Prior to Admission Medications   Prescriptions Last Dose Informant Patient Reported? Taking? Alcohol Swabs 70 % PADS   No No   Sig: May substitute brand based on insurance coverage  Check glucose TID  Blood Glucose Monitoring Suppl (OneTouch Verio Reflect) w/Device KIT   No No   Sig: May substitute brand based on insurance coverage  Check glucose TID  Insulin Glargine Solostar (Lantus SoloStar) 100 UNIT/ML SOPN   No No   Sig: Inject 0 4 mL (40 Units total) under the skin daily at bedtime   Insulin Pen Needle (BD Pen Needle Beena 2nd Gen) 32G X 4 MM MISC   No No   Sig: For use with insulin pen  Pharmacy may dispense brand covered by insurance  Insulin Pen Needle (BD Pen Needle Beena 2nd Gen) 32G X 4 MM MISC   No No   Sig: For use with insulin pen  Pharmacy may dispense brand covered by insurance  OneTouch Delica Lancets 22R MISC   No No   Sig: May substitute brand based on insurance coverage  Check glucose TID     apixaban (ELIQUIS) 5 mg   No No   Sig: Take 1 tablet (5 mg total) by mouth 2 (two) times a day   aspirin (ECOTRIN LOW STRENGTH) 81 mg EC tablet   No No   Sig: Take 1 tablet (81 mg total) by mouth daily   carvedilol (COREG) 6 25 mg tablet   No No   Sig: Take 1 tablet (6 25 mg total) by mouth 2 (two) times a day with meals   cyclobenzaprine (FLEXERIL) 10 mg tablet   No No   Sig: Take 1 tablet (10 mg total) by mouth 2 (two) times a day as needed for muscle spasms   ferrous sulfate 325 (65 Fe) mg tablet   No No   Sig: Take 1 tablet (325 mg total) by mouth daily with breakfast   fluticasone-vilanterol (BREO ELLIPTA) 100-25 mcg/inh inhaler   No No   Sig: Inhale 1 puff daily Rinse mouth after use  furosemide (LASIX) 40 mg tablet   No No   Sig: Take 1 tablet (40 mg total) by mouth daily   gabapentin (NEURONTIN) 100 mg capsule   No No   Sig: Take 1 capsule (100 mg total) by mouth 3 (three) times a day   glucose blood (OneTouch Verio) test strip   No No   Sig: May substitute brand based on insurance coverage  Check glucose TID  glucose blood test strip   No No   Sig: Use 1 each daily as needed (check sugars) Use as instructed  Uses One Touch Ultra test strip   insulin lispro (HumaLOG KwikPen) 100 units/mL injection pen   No No   Sig: Inject 15 Units under the skin 3 (three) times a day with meals   isosorbide mononitrate (IMDUR) 30 mg 24 hr tablet   No No   Sig: Take 1 tablet (30 mg total) by mouth daily   lidocaine (Lidoderm) 5 %   No No   Sig: Apply 1 patch topically daily for 6 days Remove & Discard patch within 12 hours or as directed by MD   pantoprazole (PROTONIX) 40 mg tablet   No No   Sig: Take 1 tablet (40 mg total) by mouth 2 (two) times a day before meals   simvastatin (ZOCOR) 40 mg tablet   No No   Sig: Take 1 tablet (40 mg total) by mouth daily   tamsulosin (FLOMAX) 0 4 mg   No No   Sig: Take 1 capsule (0 4 mg total) by mouth daily with dinner   umeclidinium bromide (INCRUSE ELLIPTA) 62 5 mcg/inh AEPB inhaler   No No   Sig: Inhale 1 puff daily Do not start before October 16, 2022  Facility-Administered Medications: None       Past Medical History:   Diagnosis Date   • Abdominal pain    • [facility] ) 2018   • Cardiac disease    • CHF (congestive heart failure) ([facility])    • COPD, severe (HCC)    • Coronary artery disease    • DDD (degenerative disc disease), lumbar 2020   • Diabetes mellitus (HCC)    • Dyspnea    • GERD (gastroesophageal reflux disease)    • Hyperlipidemia    • Hypertension    • MI (myocardial infarction) ([facility] )     with 3 stents   • Nodule of apex of right lung    • JACQUELINE (obstructive sleep apnea)    • Prostate cancer [facility])        Past Surgical History:   Procedure Laterality Date   • ABDOMINAL SURGERY      exploratory   • ANGIOPLASTY      3 stents   • APPENDECTOMY     • COLONOSCOPY     • CT NEEDLE BIOPSY LUNG  11/3/2020   • ESOPHAGOGASTRODUODENOSCOPY N/A 10/2/2017    Procedure: ESOPHAGOGASTRODUODENOSCOPY (EGD); Surgeon: Callie Jorge MD;  Location: [facility]; Service: Gastroenterology   • IR THORACENTESIS  11/3/2020   • KNEE CARTILAGE SURGERY     • OTHER SURGICAL HISTORY      stent placement   • PROSTATE SURGERY     • SKIN GRAFT      Basal cell CA back       Family History   Problem Relation Age of Onset   • Heart disease Father    • Other Father         Mesothelioma      I have reviewed and agree with the history as documented  E-Cigarette/Vaping   • E-Cigarette Use Never User      E-Cigarette/Vaping Substances   • Nicotine No    • THC No    • CBD No    • Flavoring No    • Other No    • Unknown No      Social History     Tobacco Use   • Smoking status: Former Smoker     Packs/day: 1 50     Years: 50 00     Pack years: 75 00     Start date: 38     Quit date: 2020     Years since quittin 2   • Smokeless tobacco: Never Used   Vaping Use   • Vaping Use: Never used   Substance Use Topics   • Alcohol use: Never   • Drug use: No        Review of Systems   Constitutional: Negative for chills and fever     HENT: Negative for congestion, ear pain, rhinorrhea and sore throat  Eyes: Negative for pain  Respiratory: Positive for shortness of breath  Negative for apnea, cough, choking, chest tightness, wheezing and stridor  Cardiovascular: Negative for chest pain, palpitations and leg swelling  Gastrointestinal: Negative for abdominal pain, constipation, diarrhea, nausea and vomiting  Genitourinary: Negative for hematuria  Musculoskeletal: Negative for arthralgias and back pain  Skin: Negative for rash and wound  Neurological: Negative for dizziness  Psychiatric/Behavioral: Negative for agitation and hallucinations  All other systems reviewed and are negative  Physical Exam  ED Triage Vitals   Temp Pulse Respirations Blood Pressure SpO2   -- 10/26/22 1205 10/26/22 1205 10/26/22 1209 10/26/22 1209    97 (!) 40 (!) 209/95 91 %      Temp src Heart Rate Source Patient Position - Orthostatic VS BP Location FiO2 (%)   -- 10/26/22 1205 10/26/22 1209 10/26/22 1209 --    Monitor Sitting Right arm       Pain Score       10/26/22 1402       Med Not Given for Pain - for MAR use only             Orthostatic Vital Signs  Vitals:    10/26/22 1350 10/26/22 1356 10/26/22 1420 10/26/22 1445   BP: 120/58 132/60 132/60 110/54   Pulse: 71 74 66 66   Patient Position - Orthostatic VS: Lying Lying Lying Lying       Physical Exam  Vitals reviewed  Constitutional:       General: He is in acute distress  Appearance: He is well-developed  He is obese  HENT:      Head: Normocephalic and atraumatic  Right Ear: External ear normal       Left Ear: External ear normal       Nose: Nose normal  No congestion or rhinorrhea  Mouth/Throat:      Mouth: Mucous membranes are moist       Pharynx: No oropharyngeal exudate or posterior oropharyngeal erythema  Eyes:      General:         Right eye: No discharge  Left eye: No discharge  Extraocular Movements: Extraocular movements intact        Conjunctiva/sclera: Conjunctivae normal  Pupils: Pupils are equal, round, and reactive to light  Cardiovascular:      Rate and Rhythm: Normal rate and regular rhythm  Pulses: Normal pulses  Heart sounds: Normal heart sounds  Pulmonary:      Effort: Respiratory distress present  Comments: Tight lungs hardly any air movement is appreciated  Abdominal:      Palpations: Abdomen is soft  Tenderness: There is no abdominal tenderness  Musculoskeletal:         General: No tenderness  Normal range of motion  Cervical back: Normal range of motion and neck supple  No rigidity  Skin:     General: Skin is dry  Coloration: Skin is pale  Findings: No erythema or rash  Neurological:      General: No focal deficit present  Mental Status: He is oriented to person, place, and time  Cranial Nerves: No cranial nerve deficit  Motor: No weakness           ED Medications  Medications   ipratropium-albuterol (DUO-NEB) 0 5-2 5 mg/3 mL inhalation solution **ADS Override Pull** (  Not Given 10/26/22 1225)   albuterol inhalation solution 10 mg (10 mg Nebulization Not Given 10/26/22 1321)     And   ipratropium (ATROVENT) 0 02 % inhalation solution 1 mg (1 mg Nebulization Not Given 10/26/22 1321)     And   sodium chloride 0 9 % inhalation solution 3 mL (3 mL Nebulization Not Given 10/26/22 1322)   propofol (DIPRIVAN) 1000 mg in 100 mL infusion (premix) (30 mcg/kg/min × 95 8 kg Intravenous Rate/Dose Change 10/26/22 1424)   calcium gluconate 2 g in sodium chloride 0 9% 100 mL (premix) (2 g Intravenous New Bag 10/26/22 1424)   chlorhexidine (PERIDEX) 0 12 % oral rinse 15 mL (has no administration in time range)   fentanyl citrate (PF) 100 MCG/2ML 50 mcg (has no administration in time range)   insulin regular (HumuLIN R,NovoLIN R) 1 Units/mL in sodium chloride 0 9 % 100 mL infusion (has no administration in time range)   furosemide (LASIX) injection 40 mg (40 mg Intravenous Given 10/26/22 1223)   albuterol inhalation solution 2 5 mg (2 5 mg Nebulization Given 10/26/22 1213)   magnesium sulfate 2 g/50 mL IVPB (premix) 2 g (0 g Intravenous Stopped 10/26/22 1248)   methylPREDNISolone sodium succinate (Solu-MEDROL) injection 125 mg (125 mg Intravenous Given 10/26/22 1244)   etomidate (AMIDATE) 2 mg/mL injection 30 mg (30 mg Intravenous Given 10/26/22 1258)   Succinylcholine Chloride 100 mg/5 mL syringe 150 mg (150 mg Intravenous Given 10/26/22 1259)   albuterol inhalation solution 10 mg (10 mg Nebulization Given 10/26/22 1256)     And   ipratropium (ATROVENT) 0 02 % inhalation solution 2 mg (2 mg Nebulization Given 10/26/22 1257)     And   sodium chloride 0 9 % inhalation solution 3 mL (3 mL Nebulization Given 10/26/22 1257)   fentanyl citrate (PF) 100 MCG/2ML 50 mcg (50 mcg Intravenous Given 10/26/22 1402)       Diagnostic Studies  Results Reviewed     Procedure Component Value Units Date/Time    UA w Reflex to Microscopic w Reflex to Culture [[id number]]     Lab Status: No result Specimen: Urine     Sputum culture and Gram stain [[id number]]     Lab Status: No result Specimen: Sputum     Legionella antigen, urine [[id number]]     Lab Status: No result Specimen: Urine     Strep Pneumoniae, Urine [[id number]]     Lab Status: No result Specimen: Urine     Blood gas, arterial [[id number]]  (Abnormal) Collected: 10/26/22 1413    Lab Status: Final result Specimen: Blood, Arterial from Artery Updated: 10/26/22 1455     pH, Arterial 7 292     pCO2, Arterial 58 7 mm Hg      pO2, Arterial 135 0 mm Hg      HCO3, Arterial 27 7 mmol/L      Base Excess, Arterial 0 8 mmol/L      O2 Content, Arterial 11 1 mL/dL      O2 HGB,Arterial  96 3 %      SOURCE Artery     Vent Type- AC AC     AC Rate 20     Tidal Volume 450 ml      Inspired Air (FIO2) 40     PEEP 6    Procalcitonin [[id number]]  (Normal) Collected: 10/26/22 1409    Lab Status: Final result Specimen: Blood from Arm, Right Updated: 10/26/22 1452     Procalcitonin 0 21 ng/ml     Blood culture [[id number]]     Lab Status: No result Specimen: Blood     Blood culture [[id number]]     Lab Status: No result Specimen: Blood     HS Troponin I 2hr [[id number]] Collected: 10/26/22 1435    Lab Status: In process Specimen: Blood from Arm, Right Updated: 10/26/22 1438    Beta Hydroxybutyrate [[id number]]  (Normal) Collected: 10/26/22 1404    Lab Status: Final result Specimen: Blood from Arm, Right Updated: 10/26/22 1433     BETA-HYDROXYBUTYRATE 0 1 mmol/L     Blood gas, venous [[id number]]  (Abnormal) Collected: 10/26/22 1404    Lab Status: Final result Specimen: Blood from Arm, Right Updated: 10/26/22 1431     pH, Dwain 7 234     pCO2, Dwain 76 9 mm Hg      pO2, Dwain 55 8 mm Hg      HCO3, Dwain 31 8 mmol/L      Base Excess, Dwain 3 3 mmol/L      O2 Content, Dwain 9 7 ml/dL      O2 HGB, VENOUS 82 3 %     HS Troponin I 4hr [[id number]]     Lab Status: No result Specimen: Blood     Lactic acid, plasma [[id number]] Collected: 10/26/22 1404    Lab Status: In process Specimen: Blood from Arm, Right Updated: 10/26/22 1409    COVID/FLU/RSV [[id number]]  (Normal) Collected: 10/26/22 1230    Lab Status: Final result Specimen: Nares from Nose Updated: 10/26/22 1330     SARS-CoV-2 Negative     INFLUENZA A PCR Negative     INFLUENZA B PCR Negative     RSV PCR Negative    Narrative:      FOR PEDIATRIC PATIENTS - copy/paste COVID Guidelines URL to browser: [email or web address]    SARS-CoV-2 assay is a Nucleic Acid Amplification assay intended for the  qualitative detection of nucleic acid from SARS-CoV-2 in nasopharyngeal  swabs  Results are for the presumptive identification of SARS-CoV-2 RNA  Positive results are indicative of infection with SARS-CoV-2, the virus  causing COVID-19, but do not rule out bacterial infection or co-infection  with other viruses  Laboratories within the [place] and its  territories are required to report all positive results to the appropriate  public health authorities  Negative results do not preclude SARS-CoV-2  infection and should not be used as the sole basis for treatment or other  patient management decisions  Negative results must be combined with  clinical observations, patient history, and epidemiological information  This test has not been FDA cleared or approved  This test has been authorized by FDA under an Emergency Use Authorization  (EUA)  This test is only authorized for the duration of time the  declaration that circumstances exist justifying the authorization of the  emergency use of an in vitro diagnostic tests for detection of SARS-CoV-2  virus and/or diagnosis of COVID-19 infection under section 564(b)(1) of  the Act, 21 U  S C  167IFA-6(Q)(3), unless the authorization is terminated  or revoked sooner  The test has been validated but independent review by FDA  and CLIA is pending  Test performed using [employer] GeneXpert: This RT-PCR assay targets N2,  a region unique to SARS-CoV-2  A conserved region in the E-gene was chosen  for pan-Sarbecovirus detection which includes SARS-CoV-2  According to CMS-2020-01-R, this platform meets the definition of high-throughput technology      Comprehensive metabolic panel [[id number]]  (Abnormal) Collected: 10/26/22 1230    Lab Status: Final result Specimen: Blood from Arm, Left Updated: 10/26/22 1317     Sodium 136 mmol/L      Potassium 5 5 mmol/L      Chloride 103 mmol/L      CO2 27 mmol/L      ANION GAP 6 mmol/L      BUN 49 mg/dL      Creatinine 2 52 mg/dL      Glucose 530 mg/dL      Calcium 8 1 mg/dL      Corrected Calcium 9 5 mg/dL      AST 20 U/L      ALT 30 U/L      Alkaline Phosphatase 100 U/L      Total Protein 6 5 g/dL      Albumin 2 2 g/dL      Total Bilirubin 0 29 mg/dL      eGFR 23 ml/min/1 73sq m     Narrative:      [place] guidelines for Chronic Kidney Disease (CKD):   •  Stage 1 with normal or high GFR (GFR > 90 mL/min/1 73 square meters)  •  Stage 2 Mild CKD (GFR = 60-89 mL/min/1 73 square meters)  •  Stage 3A Moderate CKD (GFR = 45-59 mL/min/1 73 square meters)  •  Stage 3B Moderate CKD (GFR = 30-44 mL/min/1 73 square meters)  •  Stage 4 Severe CKD (GFR = 15-29 mL/min/1 73 square meters)  •  Stage 5 End Stage CKD (GFR <15 mL/min/1 73 square meters)  Note: GFR calculation is accurate only with a steady state creatinine    NT-BNP PRO [[id number]]  (Abnormal) Collected: 10/26/22 1230    Lab Status: Final result Specimen: Blood from Arm, Left Updated: 10/26/22 1315     NT-proBNP 6,159 pg/mL     HS Troponin 0hr (reflex protocol) [[id number]]  (Normal) Collected: 10/26/22 1230    Lab Status: Final result Specimen: Blood from Arm, Left Updated: 10/26/22 1314     hs TnI 0hr 35 ng/L     POCT Blood Gas (CG8+) [[id number]]  (Abnormal) Collected: 10/26/22 1213    Lab Status: Final result Specimen: Venous Updated: 10/26/22 1257     ph, Dwain ISTAT 7 250     pCO2, Dwain i-STAT 67 3 mm HG      pO2, Dwain i-STAT 60 0 mm HG      BE, i-STAT 2 mmol/L      HCO3, Dwain i-STAT 29 5 mmol/L      CO2, i-STAT 32 mmol/L      O2 Sat, i-STAT 85 %      SODIUM, I-STAT 136 mmol/l      Potassium, i-STAT 5 8 mmol/L      Calcium, Ionized i-STAT 1 08 mmol/L      Hct, i-STAT 23 %      Hgb, i-STAT 7 8 g/dl      Glucose, i-STAT 508 mg/dl      Specimen Type VENOUS    Blood gas, venous [[id number]]  (Abnormal) Collected: 10/26/22 1230    Lab Status: Final result Specimen: Blood from Arm, Left Updated: 10/26/22 1255     pH, Dwain 7 202     pCO2, Dwain 80 3 mm Hg      pO2, Dwain 140 3 mm Hg      HCO3, Dwain 30 8 mmol/L      Base Excess, Dwain 1 7 mmol/L      O2 Content, Dwain 12 2 ml/dL      O2 HGB, VENOUS 95 3 %     CBC and differential [[id number]]  (Abnormal) Collected: 10/26/22 1230    Lab Status: Final result Specimen: Blood from Arm, Left Updated: 10/26/22 1251     WBC 8 47 Thousand/uL      RBC 2 81 Million/uL      Hemoglobin 7 9 g/dL      Hematocrit 25 6 %      MCV 91 fL      MCH 28 1 pg      MCHC 30 9 g/dL      RDW 15 6 %      MPV 10 6 fL Platelets 410 Thousands/uL      nRBC 0 /100 WBCs      Neutrophils Relative 86 %      Immat GRANS % 1 %      Lymphocytes Relative 7 %      Monocytes Relative 6 %      Eosinophils Relative 0 %      Basophils Relative 0 %      Neutrophils Absolute 7 27 Thousands/µL      Immature Grans Absolute 0 05 Thousand/uL      Lymphocytes Absolute 0 59 Thousands/µL      Monocytes Absolute 0 53 Thousand/µL      Eosinophils Absolute 0 02 Thousand/µL      Basophils Absolute 0 01 Thousands/µL                  XR chest portable   Final Result by Jackqulyn Cogan, MD (10/26 1419)         1  Chronic opacification with volume loss in the right hemithorax is similar to prior studies  2   Interval intubation                    Workstation performed: [id number]         XR chest portable   Final Result by Nick Lewis MD (10/26 1257)      Chronic right-sided changes including midline shift, increased right effusion and chronic right base atelectasis                  Workstation performed: [id number]               Procedures  ECG 12 Lead Documentation Only    Date/Time: 10/26/2022 1:12 PM  Performed by: Yoly Kimball DO  Authorized by: Yoly Kimball DO     Indications / Diagnosis:  Respiratory distress  ECG reviewed by me, the ED Provider: yes    Patient location:  ED  Previous ECG:     Previous ECG:  Compared to current    Similarity:  No change  Interpretation:     Interpretation: normal    Rate:     ECG rate:  86    ECG rate assessment: normal    Rhythm:     Rhythm: sinus rhythm    Ectopy:     Ectopy: none    QRS:     QRS axis:  Indeterminate  Conduction:     Conduction: abnormal      Abnormal conduction: non-specific intraventricular conduction delay    Intubation    Date/Time: 10/26/2022 1:14 PM  Performed by: Yoly Kimball DO  Authorized by: Yoly Kimball DO     Patient location:  ED  Consent:     Consent obtained:  Emergent situation    Consent given by:  Patient  Universal protocol:     Patient identity confirmed:  Verbally with patient  Pre-procedure details:     Patient status:  Awake    Pretreatment medications:  Etomidate    Paralytics:  Succinylcholine  Indications:     Indications for intubation: respiratory failure    Procedure details:     Preoxygenation:  Nasal cannula    CPR in progress: no      Intubation method:  Oral    Oral intubation technique:  Glidescope    Laryngoscope blade: Mac 4    Tube size (mm):  8 0    Tube type:  Cuffed    Number of attempts:  1  Placement assessment:     Tube secured with:  ETT horner    Breath sounds:  Equal    Placement verification: chest rise, CXR verification, equal breath sounds and ETCO2 detector    Post-procedure details:     Patient tolerance of procedure: Tolerated well, no immediate complications          ED Course                                       MDM  Number of Diagnoses or Management Options  Acute respiratory failure [facility]): new and requires workup  CHF (congestive heart failure) ([facility] ): established and worsening  COPD exacerbation ([facility] ): established and worsening  Hypercapnia: new and requires workup  Lower extremity edema: established and worsening     Amount and/or Complexity of Data Reviewed  Clinical lab tests: ordered and reviewed  Tests in the radiology section of CPT®: reviewed  Review and summarize past medical records: yes  Discuss the patient with other providers: yes  Independent visualization of images, tracings, or specimens: yes    Risk of Complications, Morbidity, and/or Mortality  Presenting problems: high  Diagnostic procedures: high  Management options: high    Patient Progress  Patient progress: stable  79-year-old male with past medical history of CHF, COPD presents in respiratory distress      Acute respiratory failure likely secondary to CHF exacerbation and hypercapnia    Patient presents in respiratory distress oxygenating in the 90s with nasal cannula however patient has increased work of breathing and worsening CO2 on blood gas  Patient is at imminent threat for acute respiratory demise  Patient started on nitro drip and started on Lasix as well as given heart neb breathing treatment due to combination of COPD and CHF  Patient appears fluid overloaded on his exam and his chest x-ray shows a large right pleural effusion  The patient refuses to comply with BiPAP and despite numerous attempts to convince patient to wear BiPAP mask and numerous attempts at performing shared decision making regarding BiPAP therapy the patient refuses BiPAP therapy each time  Patient tells hospital stuck that he would rather be intubated that he wear a BiPAP mask  Patient agrees for intubation and patient is intubated for acute respiratory failure  Patient is admitted to the ICU  Disposition  Final diagnoses:   Acute respiratory failure (HCC)   COPD exacerbation (HCC)   Pleural effusion   Lower extremity edema   Hypercapnia   CHF (congestive heart failure) ([facility] )     Time reflects when diagnosis was documented in both MDM as applicable and the Disposition within this note     Time User Action Codes Description Comment    10/26/2022  1:26 PM Alvie Starr Add [J96 00] Acute respiratory failure ([facility] )     10/26/2022  2:58 PM Alvie Starr Add [J44 1] COPD exacerbation ([facility] )     10/26/2022  2:59 PM Alvie Starr Add [J90] Pleural effusion     10/26/2022  2:59 PM Alvie Starr Add [R60 0] Lower extremity edema     10/26/2022  3:01 PM Alvie Starr Add [R06 89] Hypercapnia     10/26/2022  3:05 PM Fly Ngo Add [I50 9] CHF (congestive heart failure) [facility])       ED Disposition     ED Disposition   Admit    Condition   Stable    Date/Time   Wed Oct 26, 2022  1:26 PM    Comment   --         Follow-up Information    None         Current Discharge Medication List      CONTINUE these medications which have NOT CHANGED    Details   Alcohol Swabs 70 % PADS May substitute brand based on insurance coverage  Check glucose TID    Qty: 100 each, Refills: 0 Associated Diagnoses: Type 2 diabetes mellitus with hyperglycemia, with long-term current use of insulin ([facility])      apixaban (ELIQUIS) 5 mg Take 1 tablet (5 mg total) by mouth 2 (two) times a day  Qty: 60 tablet, Refills: 0    Associated Diagnoses: A-fib ([facility])      aspirin (ECOTRIN LOW STRENGTH) 81 mg EC tablet Take 1 tablet (81 mg total) by mouth daily  Qty: 30 tablet, Refills: 0    Associated Diagnoses: Essential hypertension; Coronary artery disease involving native coronary artery of native heart without angina pectoris      Blood Glucose Monitoring Suppl (OneTouch Verio Reflect) w/Device KIT May substitute brand based on insurance coverage  Check glucose TID  Qty: 1 kit, Refills: 0    Associated Diagnoses: Type 2 diabetes mellitus with hyperglycemia, with long-term current use of insulin ([facility])      carvedilol (COREG) 6 25 mg tablet Take 1 tablet (6 25 mg total) by mouth 2 (two) times a day with meals  Qty: 60 tablet, Refills: 0    Associated Diagnoses: Essential hypertension; Coronary artery disease involving native coronary artery of native heart without angina pectoris      cyclobenzaprine (FLEXERIL) 10 mg tablet Take 1 tablet (10 mg total) by mouth 2 (two) times a day as needed for muscle spasms  Qty: 21 tablet, Refills: 0    Associated Diagnoses: Muscle spasm      ferrous sulfate 325 (65 Fe) mg tablet Take 1 tablet (325 mg total) by mouth daily with breakfast  Qty: 30 tablet, Refills: 0    Associated Diagnoses: Stage 3a chronic kidney disease ([facility])      fluticasone-vilanterol (BREO ELLIPTA) 100-25 mcg/inh inhaler Inhale 1 puff daily Rinse mouth after use    Qty: 60 blister, Refills: 0    Associated Diagnoses: COPD (chronic obstructive pulmonary disease) ([facility])      furosemide (LASIX) 40 mg tablet Take 1 tablet (40 mg total) by mouth daily  Qty: 30 tablet, Refills: 0    Associated Diagnoses: Acute on chronic diastolic (congestive) heart failure ([facility])      gabapentin (NEURONTIN) 100 mg capsule Take 1 capsule (100 mg total) by mouth 3 (three) times a day  Qty: 90 capsule, Refills: 0    Associated Diagnoses: Chronic bilateral low back pain without sciatica      !! glucose blood (OneTouch Verio) test strip May substitute brand based on insurance coverage  Check glucose TID  Qty: 100 each, Refills: 0    Associated Diagnoses: Type 2 diabetes mellitus with hyperglycemia, with long-term current use of insulin ([facility] )      ! ! glucose blood test strip Use 1 each daily as needed (check sugars) Use as instructed  Uses One Touch Ultra test strip  Qty: 50 strip, Refills: 2    Comments: Needs to be delivered  Has no transportation  Associated Diagnoses: Type 2 diabetes mellitus with hypoglycemia without coma, with long-term current use of insulin ([facility])      Insulin Glargine Solostar (Lantus SoloStar) 100 UNIT/ML SOPN Inject 0 4 mL (40 Units total) under the skin daily at bedtime  Qty: 12 mL, Refills: 0    Associated Diagnoses: Type 2 diabetes mellitus with hyperglycemia, with long-term current use of insulin ([facility])      insulin lispro (HumaLOG KwikPen) 100 units/mL injection pen Inject 15 Units under the skin 3 (three) times a day with meals  Qty: 15 mL, Refills: 0    Associated Diagnoses: Type 2 diabetes mellitus with hyperglycemia, with long-term current use of insulin ([facility] )      ! ! Insulin Pen Needle (BD Pen Needle Beena 2nd Gen) 32G X 4 MM MISC For use with insulin pen  Pharmacy may dispense brand covered by insurance  Qty: 100 each, Refills: 0    Associated Diagnoses: Type 2 diabetes mellitus with hyperglycemia, with long-term current use of insulin ([facility] )      ! ! Insulin Pen Needle (BD Pen Needle Beena 2nd Gen) 32G X 4 MM MISC For use with insulin pen  Pharmacy may dispense brand covered by insurance    Qty: 100 each, Refills: 0    Associated Diagnoses: Type 2 diabetes mellitus with hyperglycemia, with long-term current use of insulin ([facility])      isosorbide mononitrate (IMDUR) 30 mg 24 hr tablet Take 1 tablet (30 mg total) by mouth daily  Qty: 30 tablet, Refills: 0    Associated Diagnoses: Essential hypertension; Coronary artery disease involving native coronary artery of native heart without angina pectoris      lidocaine (Lidoderm) 5 % Apply 1 patch topically daily for 6 days Remove & Discard patch within 12 hours or as directed by MD  Qty: 6 patch, Refills: 0    Associated Diagnoses: Chest wall pain      OneTouch Delica Lancets 16Z MISC May substitute brand based on insurance coverage  Check glucose TID  Qty: 100 each, Refills: 0    Associated Diagnoses: Type 2 diabetes mellitus with hyperglycemia, with long-term current use of insulin ([facility])      pantoprazole (PROTONIX) 40 mg tablet Take 1 tablet (40 mg total) by mouth 2 (two) times a day before meals  Qty: 60 tablet, Refills: 0    Associated Diagnoses: ABLA (acute blood loss anemia)      simvastatin (ZOCOR) 40 mg tablet Take 1 tablet (40 mg total) by mouth daily  Qty: 30 tablet, Refills: 0    Associated Diagnoses: Hypercholesterolemia      tamsulosin (FLOMAX) 0 4 mg Take 1 capsule (0 4 mg total) by mouth daily with dinner  Qty: 30 capsule, Refills: 0    Associated Diagnoses: Acute cystitis without hematuria      umeclidinium bromide (INCRUSE ELLIPTA) 62 5 mcg/inh AEPB inhaler Inhale 1 puff daily Do not start before October 16, 2022  Qty: 30 blister, Refills: 0    Associated Diagnoses: COPD with acute exacerbation ([facility] )       ! ! - Potential duplicate medications found  Please discuss with provider  No discharge procedures on file  PDMP Review       Value Time User    PDMP Reviewed  Yes 8/29/2022 10:03 PM Kadie Alcala DO           ED Provider  Attending physically available and evaluated Emelia Chavez Sr    I managed the patient along with the ED Attending      Electronically Signed by         Susan Bunch DO  10/26/22 5165

## 2022-10-26 NOTE — ED NOTES
Critical care approved [facility] [id number], RN  10/26/22 6990

## 2022-10-26 NOTE — PROGRESS NOTES
Vancomycin IV Pharmacy-to-Dose Consultation    Vivian Villanueva is a 78 y o  male who is currently receiving Vancomycin IV with management by the Pharmacy Consult service  Relevant clinical data and objective / subjective history reviewed  Vancomycin Assessment:  Indication: sepsis  Renal Function: CrCl = 25 5 mL/min  Potential Nephrotoxicity Factors:  Patient-Factors: elderly, renal dysfunction  Days of Therapy: 1  Current Dose: 1,500 mg q24h  Goal Trough: 15-20 mcg/mL  Goal AUC(24h): 400-600  Last Level: N/A      Vancomycin Plan:  New Dosin,500 mg q24h  Next Level: 10/28/22 @0400  Renal Function Monitoring: assess Scr for CrCl daily      Pharmacy will continue to follow closely for s/sx of nephrotoxicity, infusion reactions and appropriateness of therapy  BMP and CBC will be ordered per protocol  We will continue to follow the patient’s culture results and clinical progress daily  Machelle ALBA Ph

## 2022-10-26 NOTE — ED NOTES
Decision made by Dr Gisela Lozada to intubate  Pt unable to manage own airway        Che Yanez RN  10/26/22 1131

## 2022-10-26 NOTE — ED NOTES
Pt arrived EMS acute resp distress, refusing Cpap for EMS sating 90's NC 6L        Essie Salcido RN  10/26/22 7345

## 2022-10-26 NOTE — PROGRESS NOTES
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   He has had numerous hospitalization (15 this year to date) with recent discharge 2 days ago for GIB secondary to duodenal ulcer post APC  Just prior to that he had COPD exacerbation and completed OCS  He presented 10/26 for respiratory distress and noted significant hypertension  Failed BiPAP trial and required intubation  ED course reviewed - given NTG/Lasix, nebs, mag, solumedrol 125mg, then intubated and required propofol    Noted hypotension post propofol but improved post bolus    VS TM 99 3, HR 60-80's, MAPS initially 120-130's now 60-70's, SpO2 100% 0 4/6P  Exam  GEN older obese man, intubated, sedated, disheveled   HEENT ATNC, MMM, no thrush, PERRL  NECK no accessory muscle use, JVD mildly elevated  CV reg, single s1/2, no m/r  Pulm mechanical BS, diminished at bases, slight rales, no wheeze  ABD obese +BS soft NTND, no rebound  EXT 3+ edema to thighs bilaterally, warm, brisk cap refill   castro in place, yellow urine    Laboratory and Diagnostics  Results from last 7 days   Lab Units 10/26/22  1230 10/26/22  1213 10/24/22  0559 10/23/22  0457 10/22/22  1802 10/22/22  0908 10/22/22  6663 10/21/22  1654 10/21/22  0812 10/20/22  1632 10/20/22  1056 10/20/22  0431   WBC Thousand/uL 8 47  --  8 02 8 87  --   --  9 91  --  12 33*  --  9 86 7 75   HEMOGLOBIN g/dL 7 9*  --  7 3* 7 8* 7 9* 8 3* 7 4*   < > 7 3*   < > 6 6* 5 8*   I STAT HEMOGLOBIN g/dl  --  7 8*  --   --   --   --   --   --   --   --   --   --    HEMATOCRIT % 25 6*  --  23 1* 24 4* 24 2* 25 7* 22 6*   < > 22 3*   < > 20 8* 18 1*   HEMATOCRIT, ISTAT %  --  23*  -- --   --   --   --   --   --   --   --   --    PLATELETS Thousands/uL 127*  --  89* 98*  --   --  88*  --  110*  --  117* 116*   NEUTROS PCT % 86*  --   --  78*  --   --  84*  --   --   --  79* 81*   MONOS PCT % 6  --   --  10  --   --  9  --   --   --  10 9    < > = values in this interval not displayed       Results from last 7 days   Lab Units 10/26/22  1230 10/26/22  1213 10/24/22  0559 10/23/22  0457 10/22/22  8603 10/21/22  0812 10/20/22  0431 10/19/22  2335   SODIUM mmol/L 136  --  134* 139 140 140 140 136   POTASSIUM mmol/L 5 5*  --  4 4 3 9 3 9 4 7 4 3 5 3   CHLORIDE mmol/L 103  --  106 104 106 105 104 100   CO2 mmol/L 27  --  21 30 29 29 30 30   CO2, I-STAT mmol/L  --  32  --   --   --   --   --   --    ANION GAP mmol/L 6  --  7 5 5 6 6 6   BUN mg/dL 49*  --  58* 67* 72* 91* 110* 106*   CREATININE mg/dL 2 52*  --  2 50* 2 39* 2 20* 2 44* 2 64* 2 74*   CALCIUM mg/dL 8 1*  --  7 6* 7 8* 7 9* 7 9* 7 6* 7 7*   GLUCOSE RANDOM mg/dL 530*  --  95 61* 43* 159* 182* 329*   ALT U/L 30  --   --   --   --   --   --  53   AST U/L 20  --   --   --   --   --   --  47*   ALK PHOS U/L 100  --   --   --   --   --   --  47   ALBUMIN g/dL 2 2*  --   --   --   --   --   --  2 9*   TOTAL BILIRUBIN mg/dL 0 29  --   --   --   --   --   --  0 26                   Results from last 7 days   Lab Units 10/26/22  1404   LACTIC ACID mmol/L 3 5*                     Results from last 7 days   Lab Units 10/26/22  1409   PROCALCITONIN ng/ml 0 21       ABG:   Results from last 7 days   Lab Units 10/26/22  1603   PH ART  7 394   PCO2 ART mm Hg 41 6   PO2 ART mm Hg 140 0*   HCO3 ART mmol/L 24 8   BASE EXC ART mmol/L -0 1   ABG SOURCE  Radial, Left       MICRO  UA lrge LE, neg Nit, innum WBC, occ bact  Bld Cx pending  Strep/legionella ag pending  Sputum Cx pending  MRSA pending  FLU/RSV/COVID-19 neg    RADIOGRAPHS - images personally reviewed  CXR 10/26 - ETT in good position, OGT appears to be below HD but not fully captured, chronic right effusion with right hemithorax opacification and volume loss, hyperinflation of left hemithorax, mild vascular congestion    TTE 5/2022 - EF 60%, dilated RV but normal function    PFTS 10/2022 - Ratio 41%, FVC 61%, FEV1 33%, TLC 89%, %, DLCO 36%    Assessment  1  Acute on chronic hypoxic/hypercarbic respiratory failure  2  Severe COPD with possible exacerbation - noted wheezing on initial exams  3  Suspected acute/chronic diastolic CHF with component of hypertensive emergency, volume overload with severe LE edema  4  CKD IV  5  Possible UTI  6  Elevated lactate  7  Chronic exudative/lymphocytic effusion - neg cytology  8  Lung cancer post SBRT with rounded atelectasis and right hemithorax volume loss  9  Toxic/metabolic encephalopathy  10  Recent UGIB with duodenal ulcer  11  DM  12   Mild hyperkalemia    PLAN  · Admit to MICU - [facility] service - anticipate greater than 2 midnight stay  · NEURO - plan for sedation with propofol, add fentanyl gtt and precedex and attempt to wean fentanyl for goal RASS -2 to 0 as tolerated  · CARDIAC - holding BP meds for now with sedation, attempt trial at diuresis, holding eliquis for now  · PULM - continue MV support AC/VC, repeat ABG 1 hour, plan xopenex/atrovent TID, pulmicort/perforomist BID, hold further systemic steroids for now, hopeful for SBT in near future  · GI - NPO for now, continue PPI  · RENAL - monitor SCr, UOP and response to diuresis - goal negative 1-2L for now, serial BMP, follow K, trend lactate  · ID - start empiric abx with cefepime/vanc, check PCT, follow up cultures as listed  · HEME - stable, holding eliquis for now  · ENDO - Insulin gtt for now given hyperglycemia  · ICU Care - SCDs/UFH TID, PPI/CHG/HOB >30deg  · Full Code  · CC team to update family later this afternoon    Critical Care Time excluding procedures, teaching and updates is 45 minutes    Lenore Akers

## 2022-10-26 NOTE — RESPIRATORY THERAPY NOTE
RT Ventilator Management Note  Maryam Yun  78 y o  male MRN: [id number]  Unit/Bed#: ED 22 Encounter: [id number]      Daily Screen         10/26/2022  1314             Patient safety screen outcome[de-identified] Failed    Not Ready for Weaning due to[de-identified] Underline problem not resolved              Physical Exam:   Assessment Type: Assess only  General Appearance: Sedated  Respiratory Pattern: Assisted  Chest Assessment: Chest expansion symmetrical  Bilateral Breath Sounds: Diminished      Resp Comments: pt presents with SOB  has hx of severe COPD  takes breo and incruse ellipta at home  pt intubated bedside  placed on CMV settings at this time  RT will continue to monitor

## 2022-10-26 NOTE — ED NOTES
Pt bucking vent  Dr Umer Alston Palladino bolus prop 50mg at this time       Migel Hare RN  10/26/22 3534

## 2022-10-26 NOTE — RESPIRATORY THERAPY NOTE
RT Protocol Note  Dalila Recinos Sr  78 y o  male MRN: [id number]  Unit/Bed#: ED 22 Encounter: [id number]    Assessment    Active Problems:    * No active hospital problems  *      Home Pulmonary Medications:  Breo ellipta & incruse ellipta       Past Medical History:   Diagnosis Date    Abdominal pain     MARANDA (acute kidney injury) ([facility] ) 2018    Cardiac disease     CHF (congestive heart failure) (HCC)     COPD, severe (HCC)     Coronary artery disease     DDD (degenerative disc disease), lumbar 2020    Diabetes mellitus (HCC)     Dyspnea     GERD (gastroesophageal reflux disease)     Hyperlipidemia     Hypertension     MI (myocardial infarction) ([facility] )     with 3 stents    Nodule of apex of right lung     JACQUELINE (obstructive sleep apnea)     Prostate cancer ([facility] )      Social History     Socioeconomic History    Marital status:      Spouse name: Not on file    Number of children: 1    Years of education: 8    Highest education level: Not on file   Occupational History    Not on file   Tobacco Use    Smoking status: Former Smoker     Packs/day: 1 50     Years: 50 00     Pack years: 75 00     Start date: 38     Quit date: 2020     Years since quittin 2    Smokeless tobacco: Never Used   Vaping Use    Vaping Use: Never used   Substance and Sexual Activity    Alcohol use: Never    Drug use: No    Sexual activity: Not Currently     Partners: Female   Other Topics Concern    Not on file   Social History Narrative    Most recent tobacco use screenin2018    Do you currently or have you served in the [employer]: No    Were you activated, into active duty, as a member of the [employer] or as a Reservist: No    Caffeine intake: Moderate    Alcohol intake: None    Marital status:      Number of children: 1    Education: 8    Occupation: retired    Sexual orientation: Heterosexual    General stress level: Medium    Live alone or with others: with others    Exercise level: None Sexually active: No    Overweight: Yes    Obese: Yes    Guns present in home: No    Seat belts used routinely: Yes    Advance directive: No    Sunscreen used routinely: No    Smoke alarm in home: Yes    Legally blind in one or both eyes: No    Hard of hearing or deaf in one or both ears: No     Social Determinants of Health     Financial Resource Strain: Not on file   Food Insecurity: No Food Insecurity    Worried About Running Out of Food in the Last Year: Never true    Ran Out of Food in the Last Year: Never true   Transportation Needs: No Transportation Needs    Lack of Transportation (Medical): No    Lack of Transportation (Non-Medical): No   Physical Activity: Not on file   Stress: Not on file   Social Connections: Not on file   Intimate Partner Violence: Not on file   Housing Stability: Low Risk     Unable to Pay for Housing in the Last Year: No    Number of Places Lived in the Last Year: 1    Unstable Housing in the Last Year: No       Subjective         Objective    Physical Exam:   Assessment Type: Assess only  General Appearance: Sedated  Respiratory Pattern: Assisted  Chest Assessment: Chest expansion symmetrical  Bilateral Breath Sounds: Diminished    Vitals:  Blood pressure 128/60, pulse 68, resp  rate 20, SpO2 99 %  Imaging and other studies: I have personally reviewed pertinent reports  Plan    Respiratory Plan: Vent/NIV/HFNC        Resp Comments: (P) pt presents with SOB  has hx of severe COPD  takes breo and incruse ellipta at home  pt intubated bedside  placed on CMV settings at this time  RT will continue to monitor

## 2022-10-26 NOTE — ED NOTES
castro 18F inserted at this time     Rony Hobbs RN  10/26/22 BEATRICE Garcia  10/26/22 BEATRICE Garcia  10/26/22 5857

## 2022-10-26 NOTE — ED NOTES
Pt began no responding verbally at this time  CPAP being attempted to be placed onto pt        En Redd RN  10/26/22 5145

## 2022-10-26 NOTE — ED NOTES
Hayden Monae, critical care team AP, messaged requesting PRN meds for pt       Mission Delay, RN  10/26/22 3494

## 2022-10-26 NOTE — H&P
H&P Exam - Christypbmarko Watt Sr  78 y o  male MRN: [id number]  Unit/Bed#: ED 22 Encounter: [id number]      -------------------------------------------------------------------------------------------------------------  Chief Complaint: Acute hypoxic hypercapnic respiratory failure    History of Present Illness   HX and PE limited by: Intubated   Star Calix Sr  is a 78 y o  male with PMH significant for COPD (FEV1/FVC 41%; FEV1 33%; 2L O2 at home), stage 1A2 lung adenocarcinoma s/p SBRT, recurrent right pleural effusion, HFpEF (EF 60%), pulmonary hypertension,  CAD (on aspirin and statin), MI s/p PCI, afib (on eliquis) Type II DM on insulin, JACQUELINE (on CPAP), GERD, HLD, HTN, and prostate cancer s/p brachytherapy and EBRTwho presents with respiratory distress  Unable to obtain hx due to patient being intubated  Per ED providers, patient presented with hypertension (systolics in 715N) and respiratory distress saturating in low 90s  Given Nitrogylcerin x2  Initially trialed on 6L O2  He was given Lasix 40 mg x1, Nebulizer treatment, Magsulfate x1, and IV Methylprednisolone 125 mg  He continued to have worsening of his respiratory status  Initially offered to be put on BiPAP but refused and asked "for the tube"  He was subsequently sedated and intubated  After induction of sedation, patients BP dropped to 70D systolics and subsequently stabilized without use of any pressors  In ED, labwork revealed Cr 2 52 (baseline 1 6-1 8), Glucose 530, Hgb 7 9 (b/l 7-8), BNP 6,159  Venous blood gas showed pH 7 20, pCO2 80 3, PaO2 80 3, and HCO3 30 8  CXR was obtained and showed chronic right-sided changes including midline shift, increased right effusion and chronic right base atelectasis  Patient has had multiple recent admissions within the last year  His most recent admission was from 10/16-10/24 for acute hypoxic respiratory failure requiring O2 supplementation by nasal cannula   Initially thought to be a COPD exacerbation, however per Pulmonology did not believe to be an exacerbation and suggesting finishing course of prednisone from previous COPD exacerbation and continuing home inhaler therapy  Also found to have bleeding ulcer on EGD  Found to have fluctuating blood glucose readings and was discharged on Lantus 40U qhs and Lispro 15 U TID  Follows outpatient with Pulmonology for lung adenocarcinoma and severe COPD  Last seen in 04/2021  Most recent PET revealed  patchy radiotracer uptake in the right upper lobe at the site of the previously seen pulmonary lesion likely related to posttreatment changes  -------------------------------------------------------------------------------------------------------------  Assessment and Plan:    Assessment:    1)  Acute hypoxic hypercapnic respiratory failure (likely 2/2 COPD exacerbation vs CHF exacerbation)  2)  HHS   3)  SIRS (tachycardia and tachypnea)  4)  MARANDA on CKD  4)  Stage 1A2 Lung adenocarcinoma  5)  COPD  6)  HFpEF  7)  Recurrent Left Pleural Effusions   8)  CAD s/p PCI  9)  Hypertension  10)  Normocytic anemia  11)  Peptic Ulcer Disease        Plan:    Neuro:   • Diagnosis: Sedation  o Initially on Propofol in ED  o Will switch to Precedex and Fentanyl    CV:   • Diagnosis: HpEF  o Most recent Echo in 05/2022 showed EF 60% with some RV dilation  o Patient presenting with acute hypoxic hypercapnic respiratory failure   Examining as volume overloaded on exam  o Will attempt diuresis with IV lasix 80 mg  Goal net negative 2L    • Diagnosis: CAD c/p MI s/p PCI  o Plan: Restart home aspirin and plavix     • Diagnosis: Hypertension  o On Carvedilol and Imdur at home  o Will hold antihypertensives at this time     • Diagnosis: Afib  o At home on Eliquis 5 mg BID    Pulm:  • Diagnosis: Acute hypoxic hypercapnic respiratory failure  o Could be in setting of CHF exacerbation vs COPD exacerbation vs Worsening of right pleural effusion  o Consider diuresis with Lasix 80 mg with 2L output goal  • Diagnosis: COPD  o Plan: On home is on Incruse Ellipta and Breo Ellipta  o Can continue PTA inhaler therapy  · Diagnosis: Lung Adenocarcinoma  · Hx of Stage 1A2 lung adenocarcinoma s/p SBRT; Follows with Pulm outpatient  · Diagnosis: Recurrent Right Pleural Effusion  · Can consider thoracentesis if poor response with diuresis    GI:   • Diagnosis: Peptic Ulcer Disease   o Found to have ulcer on EGD on previous admission  o Will continue with IV Protonix 40 mg BID      :   • Diagnosis: MARANDA on CKD  o Plan: Creatinine elevated to 2 5 from 1 6-1 8 likely in s/o cardiorenal syndrome   o Will plan for diuresis  o Trend creatinine    · Diagnosis: MARANDA on CKD  · Maintain Cook for strict Is and Os  F/E/N:   • Plan: Currently NPO without any fluid running      Heme/Onc:   • Diagnosis: Normocytic Anemia  o Hgb stable at 7 9  o On Ferrous Sulfate 325 mg at home  o Continue to trend CBC  • Diagnosis: Prostate Cancer   o S/p brachytherapy and EBRT  o On Tamsulosin 0 4 mg at home      Endo:   • Diagnosis: HHS  o Hx of Insulin Dependent Type II DM  On recent admission was found to have uncontrolled BG and was put on Lantus 40U and Humalog 15U TID  o Glucose elevated to 530  o Will start on Insulin gtt  o Consider Endocrine consult given recent adjustment of Insulin regimen      ID:   • Diagnosis: SIRS  o Tachycardic and Tachypnic on presentation   Lactate elevated to 3 5  o CXR did not reveal any evidence of PNA  o Will obtain blood cx, UA/urine cx, sputum cx, legionella, strep pneumo  o Will initiate patient on broad spectrum abx with Vancomycin and Cefepime  o Trend fever curve and WBC  MSK/Skin:   · Turning, Wound care and SCDs    Disposition: Admit to Critical Care   Code Status: Level 1 - Full Code  --------------------------------------------------------------------------------------------------------------  Review of Systems   Unable to perform ROS: Intubated       Review of systems was unable to be performed secondary to patient being intubated]    Physical Exam  Constitutional:       Appearance: He is ill-appearing  HENT:      Head: Normocephalic and atraumatic  Mouth/Throat:      Mouth: Mucous membranes are dry  Pharynx: Oropharynx is clear  Cardiovascular:      Rate and Rhythm: Normal rate and regular rhythm  Heart sounds: No murmur heard  No friction rub  No gallop  Pulmonary:      Breath sounds: No wheezing or rales  Abdominal:      General: There is no distension  Palpations: Abdomen is soft  Musculoskeletal:      Right lower leg: Edema present  Left lower leg: Edema present  Comments: 2+ Pitting edema in bilateral lower extremities tracking up to knee   Skin:     Findings: Bruising present  Comments: Upper extremities warm; Lower extremities cold   Neurological:      Comments: Intubated and sedated       --------------------------------------------------------------------------------------------------------------  Vitals:   Vitals:    10/26/22 1340 10/26/22 1345 10/26/22 1350 10/26/22 1356   BP: (!) 79/46 (!) 87/53 120/58 132/60   BP Location: Right arm Right arm Right arm Right arm   Pulse: 66 66 71 74   Resp: 20 20 20 20   SpO2: 100% 100% 100% 99%     No data recorded        There is no height or weight on file to calculate BMI    N/A    Laboratory and Diagnostics:  Results from last 7 days   Lab Units 10/26/22  1230 10/26/22  1213 10/24/22  0559 10/23/22  0457 10/22/22  1802 10/22/22  0908 10/22/22  7701 10/21/22  1654 10/21/22  0812 10/20/22  1632 10/20/22  1056 10/20/22  0431   WBC Thousand/uL 8 47  --  8 02 8 87  --   --  9 91  --  12 33*  --  9 86 7 75   HEMOGLOBIN g/dL 7 9*  --  7 3* 7 8* 7 9* 8 3* 7 4*   < > 7 3*   < > 6 6* 5 8*   I STAT HEMOGLOBIN g/dl  --  7 8*  --   --   --   --   --   --   --   --   --   --    HEMATOCRIT % 25 6*  --  23 1* 24 4* 24 2* 25 7* 22 6*   < > 22 3*   < > 20 8* 18 1*   HEMATOCRIT, ISTAT %  --  23*  --   --   --   -- --   --   --   --   --   --    PLATELETS Thousands/uL 127*  --  89* 98*  --   --  88*  --  110*  --  117* 116*   NEUTROS PCT % 86*  --   --  78*  --   --  84*  --   --   --  79* 81*   MONOS PCT % 6  --   --  10  --   --  9  --   --   --  10 9    < > = values in this interval not displayed  Results from last 7 days   Lab Units 10/26/22  1230 10/26/22  1213 10/24/22  0559 10/23/22  0457 10/22/22  3059 10/21/22  0812 10/20/22  0431 10/19/22  2335   SODIUM mmol/L 136  --  134* 139 140 140 140 136   POTASSIUM mmol/L 5 5*  --  4 4 3 9 3 9 4 7 4 3 5 3   CHLORIDE mmol/L 103  --  106 104 106 105 104 100   CO2 mmol/L 27  --  21 30 29 29 30 30   CO2, I-STAT mmol/L  --  32  --   --   --   --   --   --    ANION GAP mmol/L 6  --  7 5 5 6 6 6   BUN mg/dL 49*  --  58* 67* 72* 91* 110* 106*   CREATININE mg/dL 2 52*  --  2 50* 2 39* 2 20* 2 44* 2 64* 2 74*   CALCIUM mg/dL 8 1*  --  7 6* 7 8* 7 9* 7 9* 7 6* 7 7*   GLUCOSE RANDOM mg/dL 530*  --  95 61* 43* 159* 182* 329*   ALT U/L 30  --   --   --   --   --   --  53   AST U/L 20  --   --   --   --   --   --  47*   ALK PHOS U/L 100  --   --   --   --   --   --  47   ALBUMIN g/dL 2 2*  --   --   --   --   --   --  2 9*   TOTAL BILIRUBIN mg/dL 0 29  --   --   --   --   --   --  0 26                       ABG:    VBG:  Results from last 7 days   Lab Units 10/26/22  1230   PH MAYE  7 202*   PCO2 MAYE mm Hg 80 3*   PO2 MAYE mm Hg 140 3*   HCO3 MAYE mmol/L 30 8*   BASE EXC MAYE mmol/L 1 7           Micro:          Imaging: I have personally reviewed pertinent reports        Historical Information   Past Medical History:   Diagnosis Date   • Abdominal pain    • MARANDA (acute [facility]) ([facility] ) 6/19/2018   • Cardiac disease    • CHF (congestive heart failure) ([facility])    • COPD, severe (H[facility])    • Coronary artery disease    • DDD (degenerative disc disease), lumbar 9/1/2020   • Diabetes mellitus ([facility] )    • Dyspnea    • GERD (gastroesophageal reflux disease)    • Hyperlipidemia    • Hypertension    • MI (myocardial infarction) ([facility] )     with 3 stents   • Nodule of apex of right lung    • JACQUELINE (obstructive sleep apnea)    • Prostate cancer [facility])      Past Surgical History:   Procedure Laterality Date   • ABDOMINAL SURGERY      exploratory   • ANGIOPLASTY      3 stents   • APPENDECTOMY     • COLONOSCOPY     • CT NEEDLE BIOPSY LUNG  11/3/2020   • ESOPHAGOGASTRODUODENOSCOPY N/A 10/2/2017    Procedure: ESOPHAGOGASTRODUODENOSCOPY (EGD); Surgeon: Marry Lopez MD;  Location: [facility];   Service: Gastroenterology   • IR THORACENTESIS  11/3/2020   • KNEE CARTILAGE SURGERY     • OTHER SURGICAL HISTORY      stent placement   • PROSTATE SURGERY     • SKIN GRAFT      Basal cell CA back     Social History   Social History     Substance and Sexual Activity   Alcohol Use Never     Social History     Substance and Sexual Activity   Drug Use No     Social History     Tobacco Use   Smoking Status Former Smoker   • Packs/day: 1 50   • Years: 50 00   • Pack years: 75 00   • Start date: 38   • Quit date: 2020   • Years since quittin 2   Smokeless Tobacco Never Used       Family History:   Family History   Problem Relation Age of Onset   • Heart disease Father    • Other Father         Mesothelioma      Family history unknown and I have reviewed this patient's family history and commented on sigificant items within the HPI      Medications:  Current Facility-Administered Medications   Medication Dose Route Frequency   • albuterol inhalation solution 10 mg  10 mg Nebulization Once    And   • ipratropium (ATROVENT) 0 02 % inhalation solution 1 mg  1 mg Nebulization Once    And   • sodium chloride 0 9 % inhalation solution 3 mL  3 mL Nebulization Once   • calcium gluconate 2 g in sodium chloride 0 9% 100 mL (premix)  2 g Intravenous Once   • chlorhexidine (PERIDEX) 0 12 % oral rinse 15 mL  15 mL Mouth/Throat Q12H [facility] & long term   • ipratropium-albuterol (DUO-NEB) 0 5-2 5 mg/3 mL inhalation solution **ADS Override Pull**       • propofol (DIPRIVAN) 1000 mg in 100 mL infusion (premix)  5-50 mcg/kg/min Intravenous Titrated     Home medications:  Prior to Admission Medications   Prescriptions Last Dose Informant Patient Reported? Taking? Alcohol Swabs 70 % PADS   No No   Sig: May substitute brand based on insurance coverage  Check glucose TID  Blood Glucose Monitoring Suppl (OneTouch Verio Reflect) w/Device KIT   No No   Sig: May substitute brand based on insurance coverage  Check glucose TID  Insulin Glargine Solostar (Lantus SoloStar) 100 UNIT/ML SOPN   No No   Sig: Inject 0 4 mL (40 Units total) under the skin daily at bedtime   Insulin Pen Needle (BD Pen Needle Beena 2nd Gen) 32G X 4 MM MISC   No No   Sig: For use with insulin pen  Pharmacy may dispense brand covered by insurance  Insulin Pen Needle (BD Pen Needle Beena 2nd Gen) 32G X 4 MM MISC   No No   Sig: For use with insulin pen  Pharmacy may dispense brand covered by insurance  OneTouch Delica Lancets 12N MISC   No No   Sig: May substitute brand based on insurance coverage  Check glucose TID  apixaban (ELIQUIS) 5 mg   No No   Sig: Take 1 tablet (5 mg total) by mouth 2 (two) times a day   aspirin (ECOTRIN LOW STRENGTH) 81 mg EC tablet   No No   Sig: Take 1 tablet (81 mg total) by mouth daily   carvedilol (COREG) 6 25 mg tablet   No No   Sig: Take 1 tablet (6 25 mg total) by mouth 2 (two) times a day with meals   cyclobenzaprine (FLEXERIL) 10 mg tablet   No No   Sig: Take 1 tablet (10 mg total) by mouth 2 (two) times a day as needed for muscle spasms   ferrous sulfate 325 (65 Fe) mg tablet   No No   Sig: Take 1 tablet (325 mg total) by mouth daily with breakfast   fluticasone-vilanterol (BREO ELLIPTA) 100-25 mcg/inh inhaler   No No   Sig: Inhale 1 puff daily Rinse mouth after use     furosemide (LASIX) 40 mg tablet   No No   Sig: Take 1 tablet (40 mg total) by mouth daily   gabapentin (NEURONTIN) 100 mg capsule   No No   Sig: Take 1 capsule (100 mg total) by mouth 3 (three) times a day   glucose blood (OneTouch Verio) test strip   No No   Sig: May substitute brand based on insurance coverage  Check glucose TID  glucose blood test strip   No No   Sig: Use 1 each daily as needed (check sugars) Use as instructed  Uses One Touch Ultra test strip   insulin lispro (HumaLOG KwikPen) 100 units/mL injection pen   No No   Sig: Inject 15 Units under the skin 3 (three) times a day with meals   isosorbide mononitrate (IMDUR) 30 mg 24 hr tablet   No No   Sig: Take 1 tablet (30 mg total) by mouth daily   lidocaine (Lidoderm) 5 %   No No   Sig: Apply 1 patch topically daily for 6 days Remove & Discard patch within 12 hours or as directed by MD   pantoprazole (PROTONIX) 40 mg tablet   No No   Sig: Take 1 tablet (40 mg total) by mouth 2 (two) times a day before meals   simvastatin (ZOCOR) 40 mg tablet   No No   Sig: Take 1 tablet (40 mg total) by mouth daily   tamsulosin (FLOMAX) 0 4 mg   No No   Sig: Take 1 capsule (0 4 mg total) by mouth daily with dinner   umeclidinium bromide (INCRUSE ELLIPTA) 62 5 mcg/inh AEPB inhaler   No No   Sig: Inhale 1 puff daily Do not start before October 16, 2022  Facility-Administered Medications: None     Allergies: Allergies   Allergen Reactions   • Crestor [Rosuvastatin] Other (See Comments)     Unknown     • Lisinopril GI Intolerance, Dizziness and Abdominal Pain   • Metformin GI Intolerance     ------------------------------------------------------------------------------------------------------------  Advance Directive and Living Will:      Power of :    POLST:    ------------------------------------------------------------------------------------------------------------  Anticipated Length of Stay is > 2 Shade MD Jany        Portions of the record may have been created with voice recognition software    Occasional wrong word or "sound a like" substitutions may have occurred due to the inherent limitations of voice recognition software    Read the chart carefully and recognize, using context, where substitutions have occurred

## 2022-10-26 NOTE — RESPIRATORY THERAPY NOTE
RT Ventilator Management Note  Nora Pierce  78 y o  male MRN: [id number]  [facility]: [id number]      Daily Screen         10/26/2022  1506 10/26/2022  1803          Patient safety screen outcome[de-identified] Failed Failed (P)       Not Ready for Weaning due to[de-identified] Underline problem not resolved --                Physical Exam:   Assessment Type: Assess only  General Appearance: Sedated  Respiratory Pattern: Assisted  Chest Assessment: Chest expansion symmetrical  Bilateral Breath Sounds: Diminished  O2 Device: (P) Mata G5      Resp Comments: vent settings changed per order

## 2022-10-26 NOTE — ED ATTENDING ATTESTATION
10/26/2022  IOsmany MD, saw and evaluated the patient  I have discussed the patient with the resident/non-physician practitioner and agree with the resident's/non-physician practitioner's findings, Plan of Care, and MDM as documented in the resident's/non-physician practitioner's note, except where noted  All available labs and Radiology studies were reviewed  I was present for key portions of any procedure(s) performed by the resident/non-physician practitioner and I was immediately available to provide assistance  At this point I agree with the current assessment done in the Emergency Department  I have conducted an independent evaluation of this patient a history and physical is as follows:    OA: 77 y/o m with multiple medical problems including CKD, CHF, COPD, CAD and recent admission for GI bleed who presents via EMS with acute respiratory distress  PT found with minimal air movement over the past few days  EMS treated pt with nitroglycerin x 1 as well as breathing treatment  PT refusing breathing treatment via mask and also refusing BIPAP  On arrival, pt with significant respiratory distress, continues to refuse any type of breathing mask, BIPAP  Attempts at giving breathing treatment with no improvement in respiratory status  Pt agreeable to intubation  Pt with worsening fatigue and worsening labored breathing  On exam, not moving air, tachycardic, HTN, dry MM, abd soft,+BS, obese, + edema b/l, oriented  A/p respiratory distress, continue with breathing treatments, steroids, NTG, labs, imaging  Given pt with worsening respiratory status, will require intubation  ED Course     Attempted to contact pt's son, no response at either number listed    ICU at bedisde       Critical Care Time  CriticalCare Time  Performed by: Osmany Dunlap MD  Authorized by: Osmany Dunlap MD     Critical care provider statement:     Critical care time (minutes):  35    Critical care time was exclusive of:  Separately billable procedures and treating other patients and teaching time    Critical care was necessary to treat or prevent imminent or life-threatening deterioration of the following conditions:  Respiratory failure, circulatory failure, endocrine crisis, metabolic crisis and dehydration    Critical care was time spent personally by me on the following activities:  Blood draw for specimens, obtaining history from patient or surrogate, development of treatment plan with patient or surrogate, discussions with consultants, evaluation of patient's response to treatment, examination of patient, ordering and performing treatments and interventions, ordering and review of laboratory studies, ordering and review of radiographic studies, re-evaluation of patient's condition, ventilator management and review of old charts

## 2022-10-27 PROBLEM — N18.9 ACUTE-ON-CHRONIC KIDNEY INJURY (HCC): Status: ACTIVE | Noted: 2022-10-27

## 2022-10-27 PROBLEM — R65.10 SIRS (SYSTEMIC INFLAMMATORY RESPONSE SYNDROME) (HCC): Status: ACTIVE | Noted: 2022-10-27

## 2022-10-27 PROBLEM — N17.9 ACUTE-ON-CHRONIC KIDNEY INJURY (HCC): Status: ACTIVE | Noted: 2022-10-27

## 2022-10-27 LAB
ALBUMIN SERPL BCP-MCNC: 2.2 G/DL (ref 3.5–5)
ALP SERPL-CCNC: 77 U/L (ref 46–116)
ALT SERPL W P-5'-P-CCNC: 23 U/L (ref 12–78)
ANION GAP SERPL CALCULATED.3IONS-SCNC: 0 MMOL/L (ref 4–13)
AST SERPL W P-5'-P-CCNC: 10 U/L (ref 5–45)
ATRIAL RATE: 91 BPM
BASOPHILS # BLD AUTO: 0 THOUSANDS/ÂΜL (ref 0–0.1)
BASOPHILS NFR BLD AUTO: 0 % (ref 0–1)
BILIRUB SERPL-MCNC: 0.23 MG/DL (ref 0.2–1)
BUN SERPL-MCNC: 51 MG/DL (ref 5–25)
CALCIUM ALBUM COR SERPL-MCNC: 10.2 MG/DL (ref 8.3–10.1)
CALCIUM SERPL-MCNC: 8.8 MG/DL (ref 8.3–10.1)
CHLORIDE SERPL-SCNC: 106 MMOL/L (ref 96–108)
CO2 SERPL-SCNC: 34 MMOL/L (ref 21–32)
CREAT SERPL-MCNC: 2.33 MG/DL (ref 0.6–1.3)
EOSINOPHIL # BLD AUTO: 0 THOUSAND/ÂΜL (ref 0–0.61)
EOSINOPHIL NFR BLD AUTO: 0 % (ref 0–6)
ERYTHROCYTE [DISTWIDTH] IN BLOOD BY AUTOMATED COUNT: 15.4 % (ref 11.6–15.1)
GFR SERPL CREATININE-BSD FRML MDRD: 26 ML/MIN/1.73SQ M
GLUCOSE SERPL-MCNC: 116 MG/DL (ref 65–140)
GLUCOSE SERPL-MCNC: 142 MG/DL (ref 65–140)
GLUCOSE SERPL-MCNC: 165 MG/DL (ref 65–140)
GLUCOSE SERPL-MCNC: 221 MG/DL (ref 65–140)
GLUCOSE SERPL-MCNC: 227 MG/DL (ref 65–140)
GLUCOSE SERPL-MCNC: 229 MG/DL (ref 65–140)
GLUCOSE SERPL-MCNC: 290 MG/DL (ref 65–140)
GLUCOSE SERPL-MCNC: 299 MG/DL (ref 65–140)
GLUCOSE SERPL-MCNC: 36 MG/DL (ref 65–140)
GLUCOSE SERPL-MCNC: 87 MG/DL (ref 65–140)
GLUCOSE SERPL-MCNC: 88 MG/DL (ref 65–140)
HCT VFR BLD AUTO: 22.5 % (ref 36.5–49.3)
HGB BLD-MCNC: 7 G/DL (ref 12–17)
IMM GRANULOCYTES # BLD AUTO: 0.03 THOUSAND/UL (ref 0–0.2)
IMM GRANULOCYTES NFR BLD AUTO: 0 % (ref 0–2)
LYMPHOCYTES # BLD AUTO: 0.28 THOUSANDS/ÂΜL (ref 0.6–4.47)
LYMPHOCYTES NFR BLD AUTO: 4 % (ref 14–44)
MAGNESIUM SERPL-MCNC: 2.7 MG/DL (ref 1.6–2.6)
MCH RBC QN AUTO: 28.2 PG (ref 26.8–34.3)
MCHC RBC AUTO-ENTMCNC: 31.1 G/DL (ref 31.4–37.4)
MCV RBC AUTO: 91 FL (ref 82–98)
MONOCYTES # BLD AUTO: 0.31 THOUSAND/ÂΜL (ref 0.17–1.22)
MONOCYTES NFR BLD AUTO: 5 % (ref 4–12)
NEUTROPHILS # BLD AUTO: 6.14 THOUSANDS/ÂΜL (ref 1.85–7.62)
NEUTS SEG NFR BLD AUTO: 91 % (ref 43–75)
NRBC BLD AUTO-RTO: 0 /100 WBCS
PHOSPHATE SERPL-MCNC: 5.2 MG/DL (ref 2.3–4.1)
PLATELET # BLD AUTO: 124 THOUSANDS/UL (ref 149–390)
PMV BLD AUTO: 10.5 FL (ref 8.9–12.7)
POTASSIUM SERPL-SCNC: 5.1 MMOL/L (ref 3.5–5.3)
PR INTERVAL: 66 MS
PROT SERPL-MCNC: 5.9 G/DL (ref 6.4–8.4)
QRS AXIS: 109 DEGREES
QRSD INTERVAL: 146 MS
QT INTERVAL: 408 MS
QTC INTERVAL: 502 MS
RBC # BLD AUTO: 2.48 MILLION/UL (ref 3.88–5.62)
SODIUM SERPL-SCNC: 140 MMOL/L (ref 135–147)
T WAVE AXIS: 40 DEGREES
VANCOMYCIN SERPL-MCNC: 15.7 UG/ML (ref 10–20)
VENTRICULAR RATE: 91 BPM
WBC # BLD AUTO: 6.76 THOUSAND/UL (ref 4.31–10.16)

## 2022-10-27 RX ORDER — INSULIN GLARGINE 100 [IU]/ML
20 INJECTION, SOLUTION SUBCUTANEOUS
Status: DISCONTINUED | OUTPATIENT
Start: 2022-10-27 | End: 2022-11-02 | Stop reason: HOSPADM

## 2022-10-27 RX ORDER — PANTOPRAZOLE SODIUM 40 MG/1
40 TABLET, DELAYED RELEASE ORAL
Status: DISCONTINUED | OUTPATIENT
Start: 2022-10-27 | End: 2022-10-28

## 2022-10-27 RX ORDER — ACETAMINOPHEN 325 MG/1
975 TABLET ORAL EVERY 6 HOURS PRN
Status: DISCONTINUED | OUTPATIENT
Start: 2022-10-27 | End: 2022-11-02 | Stop reason: HOSPADM

## 2022-10-27 RX ORDER — ISOSORBIDE MONONITRATE 30 MG/1
30 TABLET, EXTENDED RELEASE ORAL DAILY
Status: DISCONTINUED | OUTPATIENT
Start: 2022-10-27 | End: 2022-11-02 | Stop reason: HOSPADM

## 2022-10-27 RX ORDER — INSULIN LISPRO 100 [IU]/ML
2-12 INJECTION, SOLUTION INTRAVENOUS; SUBCUTANEOUS
Status: DISCONTINUED | OUTPATIENT
Start: 2022-10-27 | End: 2022-10-27

## 2022-10-27 RX ORDER — ISOSORBIDE MONONITRATE 30 MG/1
30 TABLET, EXTENDED RELEASE ORAL DAILY
Status: CANCELLED | OUTPATIENT
Start: 2022-10-28

## 2022-10-27 RX ORDER — FENTANYL CITRATE 50 UG/ML
100 INJECTION, SOLUTION INTRAMUSCULAR; INTRAVENOUS ONCE
Status: DISCONTINUED | OUTPATIENT
Start: 2022-10-27 | End: 2022-10-27

## 2022-10-27 RX ORDER — FUROSEMIDE 10 MG/ML
40 INJECTION INTRAMUSCULAR; INTRAVENOUS ONCE
Status: COMPLETED | OUTPATIENT
Start: 2022-10-27 | End: 2022-10-27

## 2022-10-27 RX ORDER — INSULIN LISPRO 100 [IU]/ML
1-6 INJECTION, SOLUTION INTRAVENOUS; SUBCUTANEOUS EVERY 6 HOURS SCHEDULED
Status: DISCONTINUED | OUTPATIENT
Start: 2022-10-27 | End: 2022-10-27

## 2022-10-27 RX ORDER — LIDOCAINE HYDROCHLORIDE 20 MG/ML
15 SOLUTION OROPHARYNGEAL 4 TIMES DAILY PRN
Status: DISCONTINUED | OUTPATIENT
Start: 2022-10-27 | End: 2022-11-02 | Stop reason: HOSPADM

## 2022-10-27 RX ORDER — INSULIN LISPRO 100 [IU]/ML
2-12 INJECTION, SOLUTION INTRAVENOUS; SUBCUTANEOUS
Status: DISCONTINUED | OUTPATIENT
Start: 2022-10-27 | End: 2022-11-01

## 2022-10-27 RX ORDER — FENTANYL CITRATE 50 UG/ML
100 INJECTION, SOLUTION INTRAMUSCULAR; INTRAVENOUS EVERY 2 HOUR PRN
Status: DISCONTINUED | OUTPATIENT
Start: 2022-10-27 | End: 2022-11-02 | Stop reason: HOSPADM

## 2022-10-27 RX ORDER — FENTANYL CITRATE 50 UG/ML
INJECTION, SOLUTION INTRAMUSCULAR; INTRAVENOUS
Status: COMPLETED
Start: 2022-10-27 | End: 2022-10-27

## 2022-10-27 RX ORDER — CARVEDILOL 6.25 MG/1
6.25 TABLET ORAL 2 TIMES DAILY WITH MEALS
Status: DISCONTINUED | OUTPATIENT
Start: 2022-10-27 | End: 2022-11-02 | Stop reason: HOSPADM

## 2022-10-27 RX ORDER — CARVEDILOL 6.25 MG/1
6.25 TABLET ORAL 2 TIMES DAILY WITH MEALS
Status: CANCELLED | OUTPATIENT
Start: 2022-10-27

## 2022-10-27 RX ORDER — ASPIRIN 81 MG/1
81 TABLET ORAL DAILY
Status: DISCONTINUED | OUTPATIENT
Start: 2022-10-27 | End: 2022-10-28

## 2022-10-27 RX ADMIN — FUROSEMIDE 40 MG: 10 INJECTION, SOLUTION INTRAMUSCULAR; INTRAVENOUS at 09:34

## 2022-10-27 RX ADMIN — VANCOMYCIN HYDROCHLORIDE 1250 MG: 10 INJECTION, POWDER, LYOPHILIZED, FOR SOLUTION INTRAVENOUS at 13:55

## 2022-10-27 RX ADMIN — CEFEPIME 2000 MG: 2 INJECTION, POWDER, FOR SOLUTION INTRAVENOUS at 16:20

## 2022-10-27 RX ADMIN — LEVALBUTEROL HYDROCHLORIDE 1.25 MG: 1.25 SOLUTION, CONCENTRATE RESPIRATORY (INHALATION) at 19:05

## 2022-10-27 RX ADMIN — FENTANYL CITRATE 100 MCG: 50 INJECTION, SOLUTION INTRAMUSCULAR; INTRAVENOUS at 05:27

## 2022-10-27 RX ADMIN — PANTOPRAZOLE SODIUM 40 MG: 40 TABLET, DELAYED RELEASE ORAL at 16:28

## 2022-10-27 RX ADMIN — FENTANYL CITRATE 100 MCG: 50 INJECTION INTRAMUSCULAR; INTRAVENOUS at 05:27

## 2022-10-27 RX ADMIN — PANTOPRAZOLE SODIUM 40 MG: 40 INJECTION, POWDER, FOR SOLUTION INTRAVENOUS at 08:16

## 2022-10-27 RX ADMIN — ACETAMINOPHEN 975 MG: 325 TABLET, FILM COATED ORAL at 20:50

## 2022-10-27 RX ADMIN — LIDOCAINE HYDROCHLORIDE 15 ML: 20 SOLUTION ORAL; TOPICAL at 22:57

## 2022-10-27 RX ADMIN — CARVEDILOL 6.25 MG: 6.25 TABLET, FILM COATED ORAL at 16:27

## 2022-10-27 RX ADMIN — IPRATROPIUM BROMIDE 0.5 MG: 0.5 SOLUTION RESPIRATORY (INHALATION) at 14:03

## 2022-10-27 RX ADMIN — FORMOTEROL FUMARATE DIHYDRATE 20 MCG: 20 SOLUTION RESPIRATORY (INHALATION) at 19:07

## 2022-10-27 RX ADMIN — IPRATROPIUM BROMIDE 0.5 MG: 0.5 SOLUTION RESPIRATORY (INHALATION) at 07:40

## 2022-10-27 RX ADMIN — CHLORHEXIDINE GLUCONATE 15 ML: 1.2 SOLUTION ORAL at 08:16

## 2022-10-27 RX ADMIN — INSULIN LISPRO 6 UNITS: 100 INJECTION, SOLUTION INTRAVENOUS; SUBCUTANEOUS at 16:34

## 2022-10-27 RX ADMIN — LEVALBUTEROL HYDROCHLORIDE 1.25 MG: 1.25 SOLUTION, CONCENTRATE RESPIRATORY (INHALATION) at 14:03

## 2022-10-27 RX ADMIN — INSULIN GLARGINE 20 UNITS: 100 INJECTION, SOLUTION SUBCUTANEOUS at 22:41

## 2022-10-27 RX ADMIN — ASPIRIN 81 MG: 81 TABLET, COATED ORAL at 14:31

## 2022-10-27 RX ADMIN — FORMOTEROL FUMARATE DIHYDRATE 20 MCG: 20 SOLUTION RESPIRATORY (INHALATION) at 07:41

## 2022-10-27 RX ADMIN — BUDESONIDE 0.5 MG: 0.5 INHALANT ORAL at 07:41

## 2022-10-27 RX ADMIN — CEFEPIME 2000 MG: 2 INJECTION, POWDER, FOR SOLUTION INTRAVENOUS at 06:12

## 2022-10-27 RX ADMIN — IPRATROPIUM BROMIDE 0.5 MG: 0.5 SOLUTION RESPIRATORY (INHALATION) at 19:05

## 2022-10-27 RX ADMIN — BUDESONIDE 0.5 MG: 0.5 INHALANT ORAL at 19:05

## 2022-10-27 RX ADMIN — FENTANYL CITRATE 50 MCG: 50 INJECTION INTRAMUSCULAR; INTRAVENOUS at 02:53

## 2022-10-27 RX ADMIN — FENTANYL CITRATE 75 MCG/HR: 0.05 INJECTION, SOLUTION INTRAMUSCULAR; INTRAVENOUS at 02:55

## 2022-10-27 RX ADMIN — LEVALBUTEROL HYDROCHLORIDE 1.25 MG: 1.25 SOLUTION, CONCENTRATE RESPIRATORY (INHALATION) at 07:40

## 2022-10-27 RX ADMIN — INSULIN LISPRO 4 UNITS: 100 INJECTION, SOLUTION INTRAVENOUS; SUBCUTANEOUS at 12:03

## 2022-10-27 RX ADMIN — INSULIN LISPRO 2 UNITS: 100 INJECTION, SOLUTION INTRAVENOUS; SUBCUTANEOUS at 10:53

## 2022-10-27 NOTE — QUICK NOTE
Attempted to call patient's son Prem Mean three times but could not reach him to provide updates  Will attempt tomorrow

## 2022-10-27 NOTE — ASSESSMENT & PLAN NOTE
-On recent admission (10/16 to 10/24) was found to have ulcer on EGD  -patient found to have hemoglobin dropped to 6 3 on 10/28  Was given 1 unit packed red blood cells with improvement to 8 1    Hemoglobin has thereafter remained stable  - given recent history of peptic ulcer and reported melanotic stool at home, GI was consulted  - no current plan for EGD/colonoscopy  - continue with pantoprazole 40 mg b i d   - appreciate any further GI recommendations

## 2022-10-27 NOTE — UTILIZATION REVIEW
Initial Clinical Review    Admission: Date/Time/Statement:   Admission Orders (From admission, onward)     Ordered        10/26/22 1357  Inpatient Admission  Once                      Orders Placed This Encounter   Procedures   • Inpatient Admission     Standing Status:   Standing     Number of Occurrences:   1     Order Specific Question:   Level of Care     Answer:   Critical Care [15]     Order Specific Question:   Estimated length of stay     Answer:   More than 2 Midnights     Order Specific Question:   Certification     Answer:   I certify that inpatient services are medically necessary for this patient for a duration of greater than two midnights  See H&P and MD Progress Notes for additional information about the patient's course of treatment  ED Arrival Information     Expected   -    Arrival   10/26/2022 12:02    Acuity   Immediate            Means of arrival   Ambulance    Escorted by   [address] [facility]    Admission type   Emergency            Arrival complaint   sob           Chief Complaint   Patient presents with   • Respiratory Distress     Pt came EMS from home with resp distress, d/c yesterday for same dx  C/o chest pain, 1x nitro EMS       Initial Presentation: 78 y o  male with PMH of COPD on 2L NC, stage 1A2 lung adenocarcinoma s/p SBRT, recurrent right pleural effusion, HFpEF, pulmonary hypertension,  CAD , MI s/p PCI, afib on eliquis, DM2, JACQUELINE on CPAP, GERD, HLD, HTN, and prostate ca s/p brachytherapy and EBRT presented to the ED w/ HTN w/ SBP in 200s and resp distress w/ O2sat in low 90's  Pt Reported becoming progressively more dyspneic x 3 days  Called EMS and was given nitro x 1 for c/o chest pain  He was recently admitted for acute hypoxic resp failure requiring O2 supplementation by NC, bleeding ulcer and fluctuating BS  He was discharged home on home inhaler therapy to f/o w/ OP pulmonology and lantus and lispro     In the ED, arrived via EMS in resp distress on 6L O2 w/ O2 satting low 90's  /95  RR 40  Given Nitro x 2, Lasix 40 mg x 1, nebs, Mag Sulfate, IV Methylprednisone 125 mg   Refusing to wear BIPAP/CPAP  He was intubated d/t worsening resp status  He became hypotensive after induction of sedation but subsequently stabilized w/o pressors  Lab work revealed Cr 2 52 (baseline 1 6-1 8), Glucose 530, Hgb 7 9 (b/l 7-8), BNP 6,159  VBG showed pH 7 20, pCO2 80 3, PaO2 80 3, and HCO3 30 8  CXR showed chronic right-sided changes including midline shift, increased right effusion and chronic right base atelectasis  Plan: Inpatient admission for evaluation and treatment of Acute hypoxic hypercapnic respiratory failure- CHF exacerbation vs COPD exacerbation vs worsening R pleural effusion, HTN, MARANDA, SIRS: Cont on MV  Switch sedation from propofol to precedex and fentanyl  Cont diuresis  Net goal neg 2L  Consider thoracentesis if poor response w/ diuresis  Hold antihypertensive meds  Cont PTA inhaler therapy  Cont IV PPI  NPO  Trend Cr, CBC  Start Insulin gtt  Will obtain blood cx, UA/urine cx, sputum cx, legionella, strep pneumo  Will initiate patient on broad spectrum abx with Vancomycin and Cefepime  Trend fever curve and WBC    Date: 10/27   Day 2:   Critical Care Notes:  Pt remained intubated, bradycardia with precedex, given robinol  Improved respiratory status, awoken and followed commands, placed on SBT this am  On exam, diminished BS at right, no rales or rhonchi  1+ LE edema dependently, castro in place w/ yellow urine  Plan: Hold fentanyl/propofol  Cont Precedex and mon HR, wean post extubation  Trial of SBT now, hopeful for extubation today  Continue xopenex/atrovent, pulmicort/perforomist, monitor off systemic steroids, he declines BiPAP use  Cont diuresis  Cont to hold Eliquis  Cont home BP meds, ASA  Hold TF pending extubation  Cont PPI  Swallow eval  Mon cr, uop and response to diuresis  Net goal 1L for now  Serial BMP  Mon K  Cont IV abx   F/u cxs  Repeat PCT  ISS       ED Triage Vitals   Temperature Pulse Respirations Blood Pressure SpO2   10/26/22 1536 10/26/22 1205 10/26/22 1205 10/26/22 1209 10/26/22 1209   99 3 °F (37 4 °C) 97 (!) 40 (!) 209/95 91 %      Temp Source Heart Rate Source Patient Position - Orthostatic VS BP Location FiO2 (%)   10/26/22 1536 10/26/22 1205 10/26/22 1209 10/26/22 1209 --   Oral Monitor Sitting Right arm       Pain Score       10/26/22 1402       Med Not Given for Pain - for MAR use only          Wt Readings from Last 1 Encounters:   10/27/22 101 kg (221 lb 12 5 oz)     Additional Vital Signs:   Date/Time Temp Pulse Resp BP MAP (mmHg) SpO2 Calculated FIO2 (%) - Nasal Cannula Nasal Cannula O2 Flow Rate (L/min) O2 Device Patient Position - Orthostatic VS   10/27/22 1403 -- -- -- -- -- 96 % -- -- -- --   10/27/22 1400 -- 98 23 Abnormal  97/50 69 61 % Abnormal  -- -- -- --   10/27/22 1300 -- 64 15 96/49 Abnormal  70 100 % -- -- -- --   10/27/22 1200 97 7 °F (36 5 °C) 64 15 121/64 89 100 % -- -- None (Room air) --   10/27/22 1100 -- 72 18 97/58 65 100 % -- -- -- --   10/27/22 1000 -- 76 15 102/55 80 100 % -- -- -- --   10/27/22 0955 -- -- -- -- -- 100 % 32 3 L/min Nasal cannula --   10/27/22 0945 -- -- -- -- -- 100 % -- -- -- --   10/27/22 0900 -- 74 24 Abnormal  97/51 69 100 % -- -- -- --   10/27/22 0800 -- 68 19 108/50 69 100 % -- -- -- --   10/27/22 0747 -- -- -- -- -- 100 % -- -- -- --   10/27/22 0741 -- -- -- -- -- 100 % -- -- -- --   10/27/22 0727 -- -- -- -- -- 100 % -- -- -- --   10/27/22 0700 97 5 °F (36 4 °C) 68 15 105/52 71 100 % -- -- -- --   10/27/22 0600 -- 72 21 101/53 73 100 % -- -- -- --   10/27/22 0500 -- 68 20 108/56 82 100 % -- -- -- --   10/27/22 0415 -- 70 14 105/57 77 100 % -- -- Ventilator Lying   10/27/22 0400 97 5 °F (36 4 °C) -- -- -- -- -- -- -- -- --   10/27/22 0300 -- 66 13 105/52 72 100 % -- -- -- --   10/27/22 0200 -- 66 13 102/53 73 100 % -- -- -- --   10/27/22 0100 -- 66 15 104/52 67 100 % -- -- -- --   10/27/22 0000 97 5 °F (36 4 °C) 66 21 104/53 75 100 % -- -- Ventilator Lying   10/26/22 2300 -- 66 15 103/50 75 100 % -- -- -- --   10/26/22 2100 -- 66 16 102/53 67 100 % -- -- -- --   10/26/22 2000 98 3 °F (36 8 °C) 64 16 100/54 75 100 % -- -- Ventilator --   10/26/22 1900 -- 64 16 109/53 72 100 % -- -- -- --   10/26/22 1830 -- 62 21 97/53 81 100 % -- -- -- --   10/26/22 1803 -- -- -- -- -- 100 % -- -- -- --   10/26/22 1800 -- 66 16 91/44 Abnormal  61 Abnormal  100 % -- -- -- --   10/26/22 1700 -- 70 21 101/52 71 100 % -- -- -- --   10/26/22 1628 -- 68 21 76/43 Abnormal  55 Abnormal  100 % -- -- -- --   10/26/22 1600 -- 68 28 Abnormal  114/52 74 100 % -- -- Ventilator Lying   10/26/22 1536 99 3 °F (37 4 °C) 68 22 99/48 Abnormal  65 100 % -- -- -- --   10/26/22 1506 -- -- -- -- -- 100 % -- -- -- --   10/26/22 1445 -- 66 12 110/54 78 100 % -- -- Ventilator Lying   10/26/22 1420 -- 66 20 132/60 86 100 % -- -- Ventilator Lying   10/26/22 1411 -- -- -- -- -- -- -- -- Ventilator --   10/26/22 1356 -- 74 20 132/60 86 99 % -- -- Ventilator Lying   10/26/22 1350 -- 71 20 120/58 84 100 % -- -- Ventilator Lying   10/26/22 1345 -- 66 20 87/53 Abnormal  66 100 % -- -- Ventilator Lying   10/26/22 1340 -- 66 20 79/46 Abnormal  58 Abnormal  100 % -- -- Ventilator Lying   10/26/22 1339 -- 66 24 Abnormal  71/42 Abnormal  52 Abnormal  99 % -- -- Ventilator Lying   10/26/22 1330 -- 67 10 Abnormal  71/40 Abnormal   50 Abnormal  99 % -- -- Ventilator --   BP: Dr Rio Medeiros at bedside at 10/26/22 1330   10/26/22 1326 -- 68 20 128/60 86 99 % -- -- Ventilator Lying   10/26/22 1322 -- 70 22 115/58 83 98 % -- -- Ventilator Lying   10/26/22 1314 -- 70 20 123/59 81 100 % -- -- Ventilator Lying   10/26/22 1308 -- 66 20 117/57 82 100 % -- -- Ventilator Lying   10/26/22 1305 -- 75 20 111/55 79 100 % -- -- Ventilator Lying   10/26/22 1300 -- 87 35 Abnormal  123/62 140 100 % -- -- Other (comment)  Lying   O2 Device: intubated, not on vent, ambu bag at 10/26/22 1300   10/26/22 1257 -- -- -- -- -- 99 % -- -- -- --   10/26/22 1255 -- 86 40 Abnormal  175/89 Abnormal  122 100 % 44 6 L/min Nasal cannula Sitting   10/26/22 1245 -- 99 25 Abnormal  183/78 Abnormal  112 100 % 44 6 L/min Nasal cannula Sitting   10/26/22 1241 -- 97 40 Abnormal  180/76 Abnormal  109 100 % 44 6 L/min Nasal cannula Sitting   10/26/22 1236 -- 97 40 Abnormal  218/88 Abnormal  127 100 % 44 6 L/min None (Room air) Lying   10/26/22 1232 -- 98 35 Abnormal  207/91 Abnormal  131 100 % 44 6 L/min Nasal cannula Lying   10/26/22 1225 -- 98 35 Abnormal  173/101 Abnormal  -- 99 % 44 6 L/min Nasal cannula Sitting   10/26/22 1209 -- -- -- 209/95 Abnormal  -- 91 % 44 6 L/min Nasal cannula Sitting     Pertinent Labs/Diagnostic Test Results:   XR chest portable ICU   Final Result by Josue German MD (10/26 1631)      Small-to-moderate right pleural effusion with underlying atelectasis  Decreased right lung volume  Workstation performed: [id number]         XR chest portable   Final Result by Jackqulyn Cogan, MD (10/26 1419)         1  Chronic opacification with volume loss in the right hemithorax is similar to prior studies  2   Interval intubation                    Workstation performed: [id number]         XR chest portable   Final Result by Nick Lewis MD (10/26 1257)      Chronic right-sided changes including midline shift, increased right effusion and chronic right base atelectasis                  Workstation performed: [id number]           10/26  EKG result; Sinus rhythm with 1st degree A-V block  Non-specific intra-ventricular conduction block  Right ventricular hypertrophy    EKG 2: Normal sinus rhythm  Right bundle branch block    EKG 3:   Normal sinus rhythm with first degree AVB  Non-specific intra-ventricular conduction block  Possible Right ventricular hypertrophy         Results from last 7 days   Lab Units 10/26/22  1230   SARS-COV-2 Negative     Results from last 7 days   Lab Units 10/27/22  0431 10/26/22  1230 10/26/22  1213 10/24/22  0559 10/23/22  0457   WBC Thousand/uL 6 76 8 47  --  8 02 8 87   HEMOGLOBIN g/dL 7 0* 7 9*  --  7 3* 7 8*   I STAT HEMOGLOBIN g/dl  --   --  7 8*  --   --    HEMATOCRIT % 22 5* 25 6*  --  23 1* 24 4*   HEMATOCRIT, ISTAT %  --   --  23*  --   --    PLATELETS Thousands/uL 124* 127*  --  89* 98*   NEUTROS ABS Thousands/µL 6 14 7 27  --   --  7 06         Results from last 7 days   Lab Units 10/27/22  0431 10/26/22  1230 10/26/22  1213 10/24/22  0559 10/23/22  0457 10/22/22  0632   SODIUM mmol/L 140 136  --  134* 139 140   POTASSIUM mmol/L 5 1 5 5*  --  4 4 3 9 3 9   CHLORIDE mmol/L 106 103  --  106 104 106   CO2 mmol/L 34* 27  --  21 30 29   CO2, I-STAT mmol/L  --   --  32  --   --   --    ANION GAP mmol/L 0* 6  --  7 5 5   BUN mg/dL 51* 49*  --  58* 67* 72*   CREATININE mg/dL 2 33* 2 52*  --  2 50* 2 39* 2 20*   EGFR ml/min/1 73sq m 26 23  --  24 25 28   CALCIUM mg/dL 8 8 8 1*  --  7 6* 7 8* 7 9*   CALCIUM, IONIZED, ISTAT mmol/L  --   --  1 08*  --   --   --    MAGNESIUM mg/dL 2 7*  --   --   --   --   --    PHOSPHORUS mg/dL 5 2*  --   --   --   --   --      Results from last 7 days   Lab Units 10/27/22  0431 10/26/22  1230   AST U/L 10 20   ALT U/L 23 30   ALK PHOS U/L 77 100   TOTAL PROTEIN g/dL 5 9* 6 5   ALBUMIN g/dL 2 2* 2 2*   TOTAL BILIRUBIN mg/dL 0 23 0 29     Results from last 7 days   Lab Units 10/27/22  1141 10/27/22  1051 10/27/22  0814 10/27/22  0812 10/27/22  0608 10/27/22  0403 10/27/22  0209 10/27/22  0002 10/26/22  2155 10/26/22  2003 10/26/22  1802 10/26/22  1609   POC GLUCOSE mg/dl 227* 229* 221* 36* 165* 87 88 116 151* 257* 424* 486*     Results from last 7 days   Lab Units 10/27/22  0431 10/26/22  1230 10/24/22  0559 10/23/22  0457 10/22/22  0632 10/21/22  0812   GLUCOSE RANDOM mg/dL 142* 530* 95 61* 43* 159*             BETA-HYDROXYBUTYRATE   Date Value Ref Range Status   10/26/2022 0 1 <0 6 mmol/L Final   06/15/2019 0 2 <0 6 mmol/L Final      Results from last 7 days   Lab Units 10/26/22  1603 10/26/22  1413   PH ART  7 394 7 292*   PCO2 ART mm Hg 41 6 58 7*   PO2 ART mm Hg 140 0* 135 0*   HCO3 ART mmol/L 24 8 27 7   BASE EXC ART mmol/L -0 1 0 8   O2 CONTENT ART mL/dL 11 1* 11 1*   O2 HGB, ARTERIAL % 96 8 96 3   ABG SOURCE  Radial, Left Artery     Results from last 7 days   Lab Units 10/26/22  1404 10/26/22  1230   PH MAYE  7 234* 7 202*   PCO2 MAYE mm Hg 76 9* 80 3*   PO2 MAYE mm Hg 55 8* 140 3*   HCO3 MAYE mmol/L 31 8* 30 8*   BASE EXC MAYE mmol/L 3 3 1 7   O2 CONTENT MAYE ml/dL 9 7 12 2   O2 HGB, VENOUS % 82 3* 95 3*     Results from last 7 days   Lab Units 10/26/22  1213   PH, MAYE I-STAT  7 250*   PCO2, MAYE ISTAT mm HG 67 3*   PO2, MAYE ISTAT mm HG 60 0*   HCO3, MAYE ISTAT mmol/L 29 5   I STAT BASE EXC mmol/L 2   I STAT O2 SAT % 85         Results from last 7 days   Lab Units 10/26/22  1609 10/26/22  1435 10/26/22  1230   HS TNI 0HR ng/L  --   --  35   HS TNI 2HR ng/L  --  49  --    HSTNI D2 ng/L  --  14  --    HS TNI 4HR ng/L 56*  --   --    HSTNI D4 ng/L 21*  --   --      Results from last 7 days   Lab Units 10/26/22  1409   PROCALCITONIN ng/ml 0 21     Results from last 7 days   Lab Units 10/26/22  1717 10/26/22  1404   LACTIC ACID mmol/L 1 3 3 5*             Results from last 7 days   Lab Units 10/26/22  1230   NT-PRO BNP pg/mL 6,159*           Results from last 7 days   Lab Units 10/26/22  1533   CLARITY UA  Turbid   COLOR UA  Colorless   SPEC GRAV UA  1 009   PH UA  5 0   GLUCOSE UA mg/dl 500 (1/2%)*   KETONES UA mg/dl Negative   BLOOD UA  Trace*   PROTEIN UA mg/dl Trace*   NITRITE UA  Negative   BILIRUBIN UA  Negative   UROBILINOGEN UA (BE) mg/dl <2 0   LEUKOCYTES UA  Large*   WBC UA /hpf Innumerable*   RBC UA /hpf 2-4*   BACTERIA UA /hpf Occasional   EPITHELIAL CELLS WET PREP /hpf Occasional     Results from last 7 days   Lab Units 10/26/22  1532 10/26/22  1230   STREP PNEUMONIAE ANTIGEN, URINE Negative  --    LEGIONELLA URINARY ANTIGEN  Negative  --    INFLUENZA A PCR   --  Negative   INFLUENZA B PCR   --  Negative   RSV PCR   --  Negative       Results from last 7 days   Lab Units 10/26/22  1528 10/26/22  1527   BLOOD CULTURE  Received in Microbiology Lab  Culture in Progress  Received in Microbiology Lab  Culture in Progress    --    SPUTUM CULTURE   --  Culture too young- will reincubate   GRAM STAIN RESULT   --  No Polys or Bacteria seen               ED Treatment:   Medication Administration from 10/26/2022 1202 to 10/26/2022 1504       Date/Time Order Dose Route Action     10/26/2022 1224 nitroGLYcerin (TRIDIL) 50 mg in 250 mL infusion (premix) 0 mcg/min Intravenous Stopped     10/26/2022 1215 nitroGLYcerin (TRIDIL) 50 mg in 250 mL infusion (premix) 28 7 mcg/min Intravenous New Bag     10/26/2022 1223 furosemide (LASIX) injection 40 mg 40 mg Intravenous Given     10/26/2022 1213 albuterol inhalation solution 2 5 mg 2 5 mg Nebulization Given     10/26/2022 1250 nitroGLYcerin (TRIDIL) 50 mg in 250 mL infusion (premix) 0 mcg/min Intravenous Stopped     10/26/2022 1220 nitroGLYcerin (TRIDIL) 50 mg in 250 mL infusion (premix) 28 7 mcg/min Intravenous New Bag     10/26/2022 1248 magnesium sulfate 2 g/50 mL IVPB (premix) 2 g 0 g Intravenous Stopped     10/26/2022 1243 magnesium sulfate 2 g/50 mL IVPB (premix) 2 g 2 g Intravenous New Bag     10/26/2022 1244 methylPREDNISolone sodium succinate (Solu-MEDROL) injection 125 mg 125 mg Intravenous Given     10/26/2022 1258 etomidate (AMIDATE) 2 mg/mL injection 30 mg 30 mg Intravenous Given     10/26/2022 1259 Succinylcholine Chloride 100 mg/5 mL syringe 150 mg 150 mg Intravenous Given     10/26/2022 1256 albuterol inhalation solution 10 mg 10 mg Nebulization Given     10/26/2022 1257 ipratropium (ATROVENT) 0 02 % inhalation solution 2 mg 2 mg Nebulization Given     10/26/2022 1257 sodium chloride 0 9 % inhalation solution 3 mL 3 mL Nebulization Given 10/26/2022 1424 propofol (DIPRIVAN) 1000 mg in 100 mL infusion (premix) 30 mcg/kg/min Intravenous Rate/Dose Change     10/26/2022 1421 propofol (DIPRIVAN) 1000 mg in 100 mL infusion (premix) 25 mcg/kg/min Intravenous Rate/Dose Change     10/26/2022 1350 propofol (DIPRIVAN) 1000 mg in 100 mL infusion (premix) 20 mcg/kg/min Intravenous Rate/Dose Change     10/26/2022 1304 propofol (DIPRIVAN) 1000 mg in 100 mL infusion (premix) 10 mcg/kg/min Intravenous New Bag     10/26/2022 1402 fentanyl citrate (PF) 100 MCG/2ML 50 mcg 50 mcg Intravenous Given     10/26/2022 1424 calcium gluconate 2 g in sodium chloride 0 9% 100 mL (premix) 2 g Intravenous New Bag        Past Medical History:   Diagnosis Date   • Abdominal pain    • MARANDA (acute kidney injury) ([place] [address] ) 6/19/2018   • Cardiac disease    • CHF (congestive heart failure) ([facility])    • COPD, severe ([place] [address] )    • Coronary artery disease    • DDD (degenerative disc disease), lumbar 9/1/2020   • Diabetes mellitus ([facility])    • Dyspnea    • GERD (gastroesophageal reflux disease)    • Hyperlipidemia    • Hypertension    • MI (myocardial infarction) ([place] [address] )     with 3 stents   • Nodule of apex of right lung    • JACQUELINE (obstructive sleep apnea)    • Prostate cancer ([place] [address] )      Present on Admission:  • CAD (coronary artery disease)  • Adenocarcinoma of lung ([place] [address] )  • HLD (hyperlipidemia)  • COPD (chronic obstructive pulmonary disease) ([facility])  • Pleural effusion on right  • Chronic diastolic heart failure ([facility])  • A-fib ([facility])  • Essential hypertension  • PUD (peptic ulcer disease)  • Acute metabolic encephalopathy      Admitting Diagnosis: Acute respiratory failure ([facility]) [J96 00]  Hypercapnia [R06 89]  Lower extremity edema [R60 0]  SOB (shortness of breath) [R06 02]  Pleural effusion [J90]  COPD exacerbation ([place] [address] ) [J44 1]  Age/Sex: 78 y o  male  Admission Orders:  SCD  Cardio-Pulmonary Monitoring, Neuro Checks, Nursing dysphagia assesment, I/O, Daily weights, Vital signs    Scheduled Medications:  aspirin, 81 mg, Oral, Daily  budesonide, 0 5 mg, Nebulization, Q12H  carvedilol, 6 25 mg, Oral, BID With Meals  cefepime, 2,000 mg, Intravenous, Q12H  chlorhexidine, 15 mL, Mouth/Throat, Q12H DRAKE  formoterol, 20 mcg, Nebulization, Q12H  insulin glargine, 20 Units, Subcutaneous, HS  insulin lispro, 2-12 Units, Subcutaneous, TID AC  ipratropium, 0 5 mg, Nebulization, TID  isosorbide mononitrate, 30 mg, Oral, Daily  levalbuterol, 1 25 mg, Nebulization, TID  pantoprazole, 40 mg, Oral, BID AC  vancomycin, 15 mg/kg (Adjusted), Intravenous, Once  furosemide (LASIX) injection 40 mg IV once    Continuous IV Infusions:  dexmedetomidine, 0 1-0 7 mcg/kg/hr, Intravenous, Titrated  fentaNYL, 75 mcg/hr, Intravenous, Continuous  propofol, 5-50 mcg/kg/min, Intravenous, Titrated  insulin regular (HumuLIN R,NovoLIN R) 1 Units/mL in sodium chloride 0 9 % 100 mL infusion  Rate: 0 3-21 mL/hr Dose: 0 3-21 Units/hr  Freq: Titrated Route: IV  Last Dose: Stopped (10/27/22 0415)  Start: 10/26/22 1715 End: 10/27/22 0417  nitroGLYcerin (TRIDIL) 50 mg in 250 mL infusion (premix)  Rate: 8 6 mL/hr Dose: 28 7 mcg/min  Freq: Titrated Route: IV  Last Dose: Stopped (10/26/22 1250)  Start: 10/26/22 1230 End: 10/26/22 1400    PRN Meds:  fentanyl citrate (PF), 100 mcg, Intravenous, Q2H PRN 10/27 x 1  vancomycin, 15 mg/kg (Adjusted), Intravenous, Daily PRN  fentanyl citrate (PF) 100 MCG/2ML 50 mcg q2h IV prn 10/27 x 1      IP CONSULT TO CASE MANAGEMENT  IP CONSULT TO PHARMACY    Network Utilization Review Department  ATTENTION: Please call with any questions or concerns to [phone] and carefully listen to the prompts so that you are directed to the right person  All voicemails are confidential   Bindu Sanders all requests for admission clinical reviews, approved or denied determinations and any other requests to dedicated fax number below belonging to the campus where the patient is receiving treatment   List of dedicated fax numbers for the Facilities:  FACILITY NAME UR FAX NUMBER   ADMISSION DENIALS (Administrative/Medical Necessity) [phone]   [address] (Maternity/NICU/Pediatrics) [phone]   [address] [phone]   [address] [place] [address] [facility] [address] [facility] [place] [address] [phone]

## 2022-10-27 NOTE — RESPIRATORY THERAPY NOTE
RT Ventilator Management Note  Fly Hernandez  78 y o  male MRN: [id number]  Unit/Bed#: MICU 03 Encounter: [id number]      Daily Screen         10/26/2022  1803 10/27/2022  0780          Patient safety screen outcome[de-identified] Failed Passed (P)                 Physical Exam:   Assessment Type: (P) Assess only  General Appearance: (P) Awake  Respiratory Pattern: (P) Assisted  Chest Assessment: (P) Chest expansion symmetrical  Bilateral Breath Sounds: (P) Diminished  Cough: (P) Productive  Suction: (P) ET Tube  O2 Device: (P) cedeno G5      Resp Comments: (P) patient is awake and remains on SCMV settings thru [address] ventilator, pt is tolerating at this time, ETT moved to left side of mouth and remains @ 27 cm bobby, pt given scheduled UDN tx via aerogen,BS diminished, plan is to wean pt this AM, RN aware, HR 66, O2 sat 100%, will continue to monitor

## 2022-10-27 NOTE — CASE MANAGEMENT
Case Management Assessment & Discharge Planning Note    Patient name Kwadwo Hastings  Location MICU 03/MICU 41 MRN [id number]  : 1945 Date 10/27/2022       Current Admission Date: 10/26/2022  Current Admission Diagnosis:Acute on chronic respiratory failure with hypoxia and hypercapnia [facility])   Patient Active Problem List    Diagnosis Date Noted   • PUD (peptic ulcer disease) 10/20/2022   • ABLA (acute blood loss anemia) 10/18/2022   • A-fib ([facility] ) 10/10/2022   • Frequent hospital admissions 2022   • Medication management 2022   • Chest pain, musculoskeletal 2022   • Hypothermia 2022   • Epididymitis, bilateral 06/15/2022   • Chronic left-sided low back pain without sciatica 2022   • SOB (shortness of breath) 2022   • Stage 3b chronic kidney disease (CKD) ([facility] ) 2022   • History of prostate cancer 2022   • Adenocarcinoma of lung ([facility] ) 2022   • Fracture of navicular bone of left foot 2021   • Acute on chronic respiratory failure with hypoxia and hypercapnia ([address] ) 04/15/2021   • PAD (peripheral artery disease) ([facility] ) 2021   • DDD (degenerative disc disease), lumbar 2020   • Anemia, iron deficiency 2020   • Lung nodule 2020   • Pulmonary hypertension ([address] ) 2019   • JACQUELINE (obstructive sleep apnea) 2019   • COPD with acute exacerbation ([facility] ) 2019   • Oxygen dependent 2018   • Acute metabolic encephalopathy    • RBBB 2018   • Chronic diastolic (congestive) heart failure ([address] ) 2018   • CAD (coronary artery disease) 2018   • Chronic diastolic heart failure ([address] ) 2018   • Pleural effusion on right 10/31/2017   • COPD, severe ([address] ) 2017   • Diabetic neuropathy ([facility] ) 2017   • Essential hypertension 2016   • Venous stasis dermatitis of both lower extremities 11/15/2016   • Type 2 diabetes mellitus with hyperglycemia, with long-term current use of insulin ([facility] ) 11/13/2016   • COPD (chronic obstructive pulmonary disease) ([facility] ) 01/20/2016   • HLD (hyperlipidemia) 01/20/2016      LOS (days): 1  Geometric Mean LOS (GMLOS) (days): 5 00  Days to GMLOS:4 1     OBJECTIVE:  PATIENT READMITTED TO HOSPITAL  Risk of Unplanned Readmission Score: 79 06         Current admission status: Inpatient       Preferred Pharmacy:   [address] [place] [address], [address] [place] [address]  Phone: [phone] Fax: [address] [facility] [place] [facility] 94 [facility] 38 210 [facility]  Phone: [phone] Fax: [phone]    Primary Care Provider: Harman Holt MD    Primary Insurance: [facility]  Secondary Insurance:     ASSESSMENT:  [address] Representative - Son   Primary Phone: [phone] (Mobile)  Home Phone: [phone]                         Readmission Root Cause  30 Day Readmission: Yes  Who directed you to return to the hospital?: Self  Did you understand whom to contact if you had questions or problems?: Yes  Did you get your prescriptions before you left the hospital?: Yes  During previous admission, was a post-acute recommendation made?: Yes  What post-acute resources were offered?: [facility] ([facility] left messages, unable to connect with patient)  Patient was readmitted due to: Acute hypoxic hypercapnic respiratory failure  Action Plan: Intubated -- pt refused bipap    Patient Information  Admitted from[de-identified] Home  Mental Status: Intubated (weaning today)  During Assessment patient was accompanied by: Not accompanied during assessment  Assessment information provided by[de-identified] Other - please comment (Chart review - pt d/c 48 hours ago)  Primary Caregiver: Self  Support Systems: Son  [place] of Residence: [address] [place] [address] do you live in?: Jack entry access options   Select all that apply : No steps to enter home  Type of Current Residence: [address] home  Upon entering residence, is there a bedroom on the main floor (no further steps)?: Yes  Upon entering residence, is there a bathroom on the main floor (no further steps)?: Yes  In the last 12 months, was there a time when you were not able to pay the mortgage or rent on time?: No  In the last 12 months, was there a time when you did not have a steady place to sleep or slept in a shelter (including now)?: No  Homeless/housing insecurity resource given?: N/A  Living Arrangements: Lives w/ Son  Is patient a ?: No    Activities of Daily Living Prior to Admission  Functional Status: Independent  Completes ADLs independently?: Yes  Ambulates independently?: Yes  Does patient use assisted devices?: Yes  Assisted Devices (DME) used: Home Oxygen concentrator, Portable Oxygen concentrator, Shower Chair, Straight Cane, Walker  Does patient currently own DME?: Yes  What DME does the patient currently own?: Portable Oxygen concentrator, Home Oxygen concentrator, Shower Chair, Straight Cane, Walker  Does the patient have a history of Short-Term Rehab?: No  Does patient have a history of HHC?: Yes  Does patient currently have [facility]?: Yes (current with [facility]  -- has not been able to start services)    Current Home Health Care  Type of Current Home Care Services: Nurse visit, Home OT, [address][de-identified] Other (please enter name in comment) ([facility])  [facility] Provider[de-identified] PCP    Patient Information Continued  Income Source: Pension/FDC  Does patient have prescription coverage?: Yes  Within the past 12 months, you worried that your food would run out before you got the money to buy more : Never true  Within the past 12 months, the food you bought just didn't last and you didn't have money to get more : Never true  Food insecurity resource given?: N/A  Does patient receive dialysis treatments?: No  Does patient have a history of substance abuse?: No  Does patient have a history of Mental Health Diagnosis?: No         Means of Transportation  In the past 12 months, has lack of transportation kept you from medical appointments or from getting medications?: No  In the past 12 months, has lack of transportation kept you from meetings, work, or from getting things needed for daily living?: No        DISCHARGE DETAILS:    Discharge planning discussed with[de-identified] patient weaning from intubation -- chart review as pt d/c on 10/24  [place] of Choice: Yes     CM contacted family/caregiver?: No- see comments (will speak with patient when fully alert/oriented)  Were Treatment Team discharge recommendations reviewed with patient/caregiver?: No (awaiting recommendations)          Contacts  Patient Contacts: Sapna Santo , son  Relationship to Patient[de-identified] Family  Contact Method: Phone  Phone Number: [phone]  Reason/Outcome: Discharge [address]         Is the patient interested in [facility] at discharge?: Yes  [address] requested[de-identified] Nursing, Occupational Therapy, Physical [address] Name[de-identified] Other ([address])  [address] [place] [address] Provider[de-identified] PCP  Home Health Services Needed[de-identified] Oxygen Via Nasal Cannula, Strengthening/Theraputic Exercises to Improve Function, Evaluate Functional Status and Safety  Homebound Criteria Met[de-identified] Requires the Assistance of Another Person for Safe Ambulation or to Leave the Home, Uses an Assist Device (i e  cane, walker, etc)  Supporting Clincal Findings[de-identified] Requires Oxygen, Limited Endurance                   Treatment Team Recommendation: Other (TBD)  Discharge Destination Plan[de-identified] Other (TBD -- possibly home with home health)  Transport at Discharge : Family                Patient/caregiver received discharge checklist   Content reviewed  Patient/caregiver encouraged to participate in discharge plan of care prior to discharge home    CM reviewed d/c planning process including the following: identifying help at home, patient preference for d/c planning needs, Discharge Lounge, [facility] Meds to Bed program, availability of treatment team to discuss questions or concerns patient and/or family may have regarding understanding medications and recognizing signs and symptoms once discharged  CM also encouraged patient to follow up with all recommended appointments after discharge  Patient advised of importance for patient and family to participate in managing patient’s medical well being  Additional Comments: Patient recently discharged home on 10/24 with [facility]  -- agency left messages but was unable to start service before patient was re-admitted on 10/26 with acute respiratory failure and intubated  Return referral placed to [facility], CM will continue to follow for additional discharge needs

## 2022-10-27 NOTE — PROGRESS NOTES
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   He has had numerous hospitalization (15 this year to date) with recent discharge 2 days ago for GIB secondary to duodenal ulcer post APC  Just prior to that he had COPD exacerbation and completed OCS  He presented 10/26 for respiratory distress and noted significant hypertension  Failed BiPAP trial and required intubation        24hr events - remained intubated, bradycardia with precedex, given robinol    Improved respiratory status, awoken and followed commands, placed on SBT this am       VS AF, HR 60-70's, MAPS 50-70's, SpO2 100% (0 4/6P), I/Os -1 3L  Exam  GEN obese older man, in bed, intubated, follows commands all extremities, RASS 0  HEENT ATNC, MMM, no thrush  NECK no accessory muscle use, JVD unable to be determined   CV reg, single s1/2, no m/r  Pulm mechanical BS, no wheeze, diminished BS at right, no rales or rhonchi  ABD +BS soft NTND, no rebound  EXT 1+ LE edema dependently, warm brisk cap refill   castro in place yellow urine    Laboratory and Diagnostics  Results from last 7 days   Lab Units 10/27/22  0431 10/26/22  1230 10/26/22  1213 10/24/22  0559 10/23/22  0457 10/22/22  1802 10/22/22  0908 10/22/22  1563 10/21/22  1654 10/21/22  0812 10/20/22  1632 10/20/22  1056   WBC Thousand/uL 6 76 8 47  --  8 02 8 87  --   --  9 91  --  12 33*  --  9 86   HEMOGLOBIN g/dL 7 0* 7 9*  --  7 3* 7 8* 7 9* 8 3* 7 4*   < > 7 3*   < > 6 6*   I STAT HEMOGLOBIN g/dl  --   --  7 8*  --   --   --   --   --   --   --   --   --    HEMATOCRIT % 22 5* 25 6*  --  23 1* 24 4* 24 2* 25 7* 22 6*   < > 22 3*   < > 20 8*   HEMATOCRIT, ISTAT %  -- --  23*  --   --   --   --   --   --   --   --   --    PLATELETS Thousands/uL 124* 127*  --  89* 98*  --   --  88*  --  110*  --  117*   NEUTROS PCT % 91* 86*  --   --  78*  --   --  84*  --   --   --  79*   MONOS PCT % 5 6  --   --  10  --   --  9  --   --   --  10    < > = values in this interval not displayed       Results from last 7 days   Lab Units 10/27/22  0431 10/26/22  1230 10/26/22  1213 10/24/22  0559 10/23/22  0457 10/22/22  0632 10/21/22  0812   SODIUM mmol/L 140 136  --  134* 139 140 140   POTASSIUM mmol/L 5 1 5 5*  --  4 4 3 9 3 9 4 7   CHLORIDE mmol/L 106 103  --  106 104 106 105   CO2 mmol/L 34* 27  --  21 30 29 29   CO2, I-STAT mmol/L  --   --  32  --   --   --   --    ANION GAP mmol/L 0* 6  --  7 5 5 6   BUN mg/dL 51* 49*  --  58* 67* 72* 91*   CREATININE mg/dL 2 33* 2 52*  --  2 50* 2 39* 2 20* 2 44*   CALCIUM mg/dL 8 8 8 1*  --  7 6* 7 8* 7 9* 7 9*   GLUCOSE RANDOM mg/dL 142* 530*  --  95 61* 43* 159*   ALT U/L 23 30  --   --   --   --   --    AST U/L 10 20  --   --   --   --   --    ALK PHOS U/L 77 100  --   --   --   --   --    ALBUMIN g/dL 2 2* 2 2*  --   --   --   --   --    TOTAL BILIRUBIN mg/dL 0 23 0 29  --   --   --   --   --      Results from last 7 days   Lab Units 10/27/22  0431   MAGNESIUM mg/dL 2 7*   PHOSPHORUS mg/dL 5 2*               Results from last 7 days   Lab Units 10/26/22  1717 10/26/22  1404   LACTIC ACID mmol/L 1 3 3 5*                     Results from last 7 days   Lab Units 10/26/22  1409   PROCALCITONIN ng/ml 0 21       ABG:   Results from last 7 days   Lab Units 10/26/22  1603   PH ART  7 394   PCO2 ART mm Hg 41 6   PO2 ART mm Hg 140 0*   HCO3 ART mmol/L 24 8   BASE EXC ART mmol/L -0 1   ABG SOURCE  Radial, Left     MICRO  UA lrge LE, neg Nit, innum WBC, occ bact  Bld Cx pending  Strep/legionella ag pending  Sputum Cx pending  MRSA pending  FLU/RSV/COVID-19 neg     RADIOGRAPHS - new images personally reviewed  CXR 10/26 - ETT in good position, OGT appears to be below HD but not fully captured, chronic right effusion with right hemithorax opacification and volume loss, hyperinflation of left hemithorax, mild vascular congestion     TTE 5/2022 - EF 60%, dilated RV but normal function     PFTS 10/2022 - Ratio 41%, FVC 61%, FEV1 33%, TLC 89%, %, DLCO 36%     Assessment  1  Acute on chronic hypoxic/hypercarbic respiratory failure  2  Severe COPD with possible exacerbation - noted wheezing on initial exams  3  Suspected acute/chronic diastolic CHF with component of hypertensive emergency, volume overload with severe LE edema  4  CKD IV  5  Possible UTI  6  Elevated lactate  7  Chronic exudative/lymphocytic effusion - neg cytology  8  Lung cancer post SBRT with rounded atelectasis and right hemithorax volume loss  9  Toxic/metabolic encephalopathy  10  Recent UGIB with duodenal ulcer  11  Anemia   12  DM  13  Mild hyperkalemia     PLAN  · NEURO - holding fentanyl/propofol for now pending SBT, continue precedex monitor HR, wean post extubation  · CARDIAC - resume home BP meds, resume ASA, continue to hold eliquis pending Hgb stability  Continue diuresis 40mg lasix x 1 now  · PULM - Trial of SBT now, hopeful for extubation today   Continue xopenex/atrovent, pulmicort/perforomist, monitor off systemic steroids, he declines BiPAP use  · GI - holding TFs pending extubation, swallow eval afterward, continue BID PPI  · RENAL - monitor SCr, UOP and response to diuresis - goal negative 1L for now, serial BMP, follow K  · ID - cont cefepime/vanc, repeat PCT, follow up cultures as listed  · HEME - mild decrease Hgb, cont to trend, holding eliquis for now  · ENDO - ISS  · ICU Care - SCDs/UFH TID, PPI/CHG/HOB >30deg  · Full Code  · CC team to update family later this afternoon    Addendum - RSBI 20 at 30min PSV 5/6cmH2O - plan for extubation now    Critical Care time excluding procedures, teaching and updates is 35 minutes      Derek Freeman

## 2022-10-27 NOTE — SPEECH THERAPY NOTE
Speech-Language Pathology Bedside Swallow Evaluation      Patient Name: Kin Etienne  Today's Date: 10/27/2022     Problem List  Principal Problem:    Acute on chronic respiratory failure with hypoxia and hypercapnia (HCC)  Active Problems:    COPD (chronic obstructive pulmonary disease) (HCC)    HLD (hyperlipidemia)    Type 2 diabetes mellitus with hyperglycemia, with long-term current use of insulin (HCC)    Pleural effusion on right    Chronic diastolic heart failure (HCC)    Essential hypertension    CAD (coronary artery disease)    Acute metabolic encephalopathy    Adenocarcinoma of lung (HCC)    History of prostate cancer    A-fib (HCC)    PUD (peptic ulcer disease)    Acute-on-chronic kidney injury ([facility] )    SIRS (systemic inflammatory response syndrome) ([facility] )      Past Medical History  Past Medical History:   Diagnosis Date   • Abdominal pain    • MARANDA (acute kidney injury) ([facility] ) 6/19/2018   • Cardiac disease    • CHF (congestive heart failure) ([facility])    • COPD, severe ([facility] )    • Coronary artery disease    • DDD (degenerative disc disease), lumbar 9/1/2020   • Diabetes mellitus (HCC)    • Dyspnea    • GERD (gastroesophageal reflux disease)    • Hyperlipidemia    • Hypertension    • MI (myocardial infarction) ([facility] )     with 3 stents   • Nodule of apex of right lung    • JACQUELINE (obstructive sleep apnea)    • Prostate cancer [facility])        Past Surgical History  Past Surgical History:   Procedure Laterality Date   • ABDOMINAL SURGERY      exploratory   • ANGIOPLASTY      3 stents   • APPENDECTOMY     • COLONOSCOPY  2015   • CT NEEDLE BIOPSY LUNG  11/3/2020   • ESOPHAGOGASTRODUODENOSCOPY N/A 10/2/2017    Procedure: ESOPHAGOGASTRODUODENOSCOPY (EGD); Surgeon: Erin Martins MD;  Location: [facility];   Service: Gastroenterology   • IR THORACENTESIS  11/3/2020   • KNEE CARTILAGE SURGERY     • OTHER SURGICAL HISTORY      stent placement   • PROSTATE SURGERY     • SKIN GRAFT      Basal cell CA back       Summary   Pt presented with s/s suggestive of mild oral dysphagia characterized by prolonged mastication and transfer time with regular solids  Patient is edentulous  Pharyngeal stage of swallowing appears adequate  The patient does not present with any overt s/s aspiration  Risk/s for Aspiration: low     Recommended Diet: regular diet and thin liquids; Patient educated to choose softer menu items as needed  He states "I want my food regular, not soft!"   Recommended Form of Meds: whole with liquid   Aspiration precautions and swallowing strategies: upright posture and small bites/sips  Other Recommendations: Continue frequent oral care; can complete VBS if aspiration continues to be of concern     Current Medical Status  HX and PE limited by: Intubated              Dominguez Chávez Sr  is a 78 y o  male with PMH significant for COPD (FEV1/FVC 41%; FEV1 33%; 2L O2 at home), stage 1A2 lung adenocarcinoma s/p SBRT, recurrent right pleural effusion, HFpEF (EF 60%), pulmonary hypertension,  CAD (on aspirin and statin), MI s/p PCI, afib (on eliquis) Type II DM on insulin, JACQUELINE (on CPAP), GERD, HLD, HTN, and prostate cancer s/p brachytherapy and EBRTwho presents with respiratory distress  Unable to obtain hx due to patient being intubated  Per ED providers, patient presented with hypertension (systolics in 481Z) and respiratory distress saturating in low 90s  Given Nitrogylcerin x2  Initially trialed on 6L O2  He was given Lasix 40 mg x1, Nebulizer treatment, Magsulfate x1, and IV Methylprednisolone 125 mg  He continued to have worsening of his respiratory status  Initially offered to be put on BiPAP but refused and asked "for the tube"  He was subsequently sedated and intubated  After induction of sedation, patients BP dropped to 70Y systolics and subsequently stabilized without use of any pressors  In ED, labwork revealed Cr 2 52 (baseline 1 6-1 8), Glucose 530, Hgb 7 9 (b/l 7-8), BNP 6,159   Venous blood gas showed pH 7 20, pCO2 80 3, PaO2 80 3, and HCO3 30 8  CXR was obtained and showed chronic right-sided changes including midline shift, increased right effusion and chronic right base atelectasis  Patient has had multiple recent admissions within the last year  His most recent admission was from 10/16-10/24 for acute hypoxic respiratory failure requiring O2 supplementation by nasal cannula  Initially thought to be a COPD exacerbation, however per Pulmonology did not believe to be an exacerbation and suggesting finishing course of prednisone from previous COPD exacerbation and continuing home inhaler therapy  Also found to have bleeding ulcer on EGD  Found to have fluctuating blood glucose readings and was discharged on Lantus 40U qhs and Lispro 15 U TID  Follows outpatient with Pulmonology for lung adenocarcinoma and severe COPD  Last seen in 04/2021  Most recent PET revealed  patchy radiotracer uptake in the right upper lobe at the site of the previously seen pulmonary lesion likely related to posttreatment changes       Current Precautions:  Fall  Aspiration   Allergies:  No known food allergies  Past medical history:  Please see H&P for details    Special Studies:  10/26/2022 chest xray:   Small-to-moderate right pleural effusion with underlying atelectasis  Decreased right lung volume  Social/Education/Vocational Hx:  Pt lives alone    Swallow Information   Current Risks for Dysphagia & Aspiration: recent intubation  Current Symptoms/Concerns: coughing in general; not necessarily related to PO intake   Current Diet: regular diet and thin liquids   Baseline Diet: regular diet and thin liquids    Baseline Assessment   Behavior/Cognition: alert  Speech/Language Status: able to participate in conversation and able to follow commands  Patient Positioning: upright in bed  Pain Status/Interventions/Response to Interventions: No report of or nonverbal indications of pain       Swallow Mechanism Exam  Facial: symmetrical  Labial: WFL  Lingual: WFL  Velum: symmetrical  Mandible: adequate ROM  Dentition: edentulous  Vocal quality:clear/adequate   Volitional Cough: strong/productive   Respiratory Status: on 3 L O2      Consistencies Assessed and Performance   Consistencies Administered: thin liquids, puree and hard solids  Materials administered included applesauce and cookie with thin liquids     Oral Stage: mild  Mastication and transfer time is min prolonged with regular solids  Bolus formation was adequate with with no significant oral residue noted  No overt s/s reduced oral control  Pharyngeal Stage: WFL  Swallow Mechanics:  Swallowing initiation appeared prompt  Laryngeal rise was palpated and judged to be within functional limits  No coughing, throat clearing, change in vocal quality or respiratory status noted today  Esophageal Concerns: none reported    Summary and Recommendations (see above)    Results Reviewed with: patient and RN     Treatment Recommended: evaluation only   Can consider VBS if aspiration continues to be of concern

## 2022-10-27 NOTE — ASSESSMENT & PLAN NOTE
Lab Results   Component Value Date    EGFR 26 10/27/2022    EGFR 23 10/26/2022    EGFR 24 10/24/2022    CREATININE 2 33 (H) 10/27/2022    CREATININE 2 52 (H) 10/26/2022    CREATININE 2 50 (H) 10/24/2022     Previous baseline Cr 1 4-1 7 however most recent baseline creatinine documented around 2 6-2 8 per renal notes from recent admission last week   · Likely CKD progression  · Creat with improvement with diuresis in ICU  · Will give another dose of IV lasix 10/30 and likely resume PO home dose (40 mg daily) on 10/31  · Trend BMP  · Avoid nephrotoxins or hypotension

## 2022-10-27 NOTE — ASSESSMENT & PLAN NOTE
Lab Results   Component Value Date    HGBA1C 9 7 (H) 10/10/2022       Recent Labs     10/27/22  0812 10/27/22  0814 10/27/22  1051 10/27/22  1141   POCGLU 36* 221* 229* 227*       Blood Sugar Average: Last 72 hrs:  (P) 207 25     A1c 9 7  · On most recent hospital discharge (10/24/22) was discharged on 40U lantus and Humalog 15U TID per Endocrinology recommendations  · On presentation BG elevated to 500s and was started on insulin drip  · Currently on Lantus 20 units HS Humalog 8 units t i d  with meals  · Titrate p r n

## 2022-10-27 NOTE — PLAN OF CARE
Problem: MOBILITY - ADULT  Goal: Maintain or return to baseline ADL function  Description: INTERVENTIONS:  -  Assess patient's ability to carry out ADLs; assess patient's baseline for ADL function and identify physical deficits which impact ability to perform ADLs (bathing, care of mouth/teeth, toileting, grooming, dressing, etc )  - Assess/evaluate cause of self-care deficits   - Assess range of motion  - Assess patient's mobility; develop plan if impaired  - Assess patient's need for assistive devices and provide as appropriate  - Encourage maximum independence but intervene and supervise when necessary  - Involve family in performance of ADLs  - Assess for home care needs following discharge   - Consider OT consult to assist with ADL evaluation and planning for discharge  - Provide patient education as appropriate  Outcome: Progressing  Goal: Maintains/Returns to pre admission functional level  Description: INTERVENTIONS:  - Perform BMAT or MOVE assessment daily    - Set and communicate daily mobility goal to care team and patient/family/caregiver     - Collaborate with rehabilitation services on mobility goals if consulted  - Out of bed for toileting  - Record patient progress and toleration of activity level   Outcome: Progressing     Problem: PAIN - ADULT  Goal: Verbalizes/displays adequate comfort level or baseline comfort level  Description: Interventions:  - Encourage patient to monitor pain and request assistance  - Assess pain using appropriate pain scale  - Administer analgesics based on type and severity of pain and evaluate response  - Implement non-pharmacological measures as appropriate and evaluate response  - Consider cultural and social influences on pain and pain management  - Notify physician/advanced practitioner if interventions unsuccessful or patient reports new pain  Outcome: Progressing     Problem: INFECTION - ADULT  Goal: Absence or prevention of progression during hospitalization  Description: INTERVENTIONS:  - Assess and monitor for signs and symptoms of infection  - Monitor lab/diagnostic results  - Monitor all insertion sites, i e  indwelling lines, tubes, and drains  - Monitor endotracheal if appropriate and nasal secretions for changes in amount and color  - [place] appropriate cooling/warming therapies per order  - Administer medications as ordered  - Instruct and encourage patient and family to use good hand hygiene technique  - Identify and instruct in appropriate isolation precautions for identified infection/condition  Outcome: Progressing  Goal: Absence of fever/infection during neutropenic period  Description: INTERVENTIONS:  - Monitor WBC    Outcome: Progressing     Problem: SAFETY ADULT  Goal: Maintain or return to baseline ADL function  Description: INTERVENTIONS:  -  Assess patient's ability to carry out ADLs; assess patient's baseline for ADL function and identify physical deficits which impact ability to perform ADLs (bathing, care of mouth/teeth, toileting, grooming, dressing, etc )  - Assess/evaluate cause of self-care deficits   - Assess range of motion  - Assess patient's mobility; develop plan if impaired  - Assess patient's need for assistive devices and provide as appropriate  - Encourage maximum independence but intervene and supervise when necessary  - Involve family in performance of ADLs  - Assess for home care needs following discharge   - Consider OT consult to assist with ADL evaluation and planning for discharge  - Provide patient education as appropriate  Outcome: Progressing  Goal: Maintains/Returns to pre admission functional level  Description: INTERVENTIONS:  - Perform BMAT or MOVE assessment daily    - Set and communicate daily mobility goal to care team and patient/family/caregiver     - Collaborate with rehabilitation services on mobility goals if consulted  - Out of bed for toileting  - Record patient progress and toleration of activity level   Outcome: Progressing  Goal: Patient will remain free of falls  Description: INTERVENTIONS:  - Educate patient/family on patient safety including physical limitations  - Instruct patient to call for assistance with activity   - Consult OT/PT to assist with strengthening/mobility   - Keep Call bell within reach  - Keep bed low and locked with side rails adjusted as appropriate  - Keep care items and personal belongings within reach  - Initiate and maintain comfort rounds  - Make Fall Risk Sign visible to staff  - Apply yellow socks and bracelet for high fall risk patients  - Consider moving patient to room near nurses station  Outcome: Progressing     Problem: DISCHARGE PLANNING  Goal: Discharge to home or other facility with appropriate resources  Description: INTERVENTIONS:  - Identify barriers to discharge w/patient and caregiver  - Arrange for needed discharge resources and transportation as appropriate  - Identify discharge learning needs (meds, wound care, etc )  - Arrange for interpretive services to assist at discharge as needed  - Refer to Case Management Department for coordinating discharge planning if the patient needs post-hospital services based on physician/advanced practitioner order or complex needs related to functional status, cognitive ability, or social support system  Outcome: Progressing     Problem: Knowledge Deficit  Goal: Patient/family/caregiver demonstrates understanding of disease process, treatment plan, medications, and discharge instructions  Description: Complete learning assessment and assess knowledge base    Interventions:  - Provide teaching at level of understanding  - Provide teaching via preferred learning methods  Outcome: Progressing

## 2022-10-27 NOTE — RESPIRATORY THERAPY NOTE
RT Ventilator Management Note  Laureano Mckinney Sr  78 y o  male MRN: [id number]  Unit/Bed#: [facility] 03 Encounter: [id number]      Daily Screen         10/27/2022  0727 10/27/2022  0747          Patient safety screen outcome[de-identified] Passed Passed      Spont breathing trial % for 30 min: -- --                Physical Exam:   Assessment Type: During-treatment  General Appearance: Alert, Awake  Respiratory Pattern: Assisted  Chest Assessment: Chest expansion symmetrical  Bilateral Breath Sounds: Coarse  Cough: Productive  Suction: Oral, ET Tube  O2 Device: G5      Resp Comments: pt sx prior to extubation for sm white oral and sm white ETT secretions, pos cuff leak test, pt extubated to 6 lpm nc, neg stridor, pos voice, pt is satting 100% will wean flow to 3 lpm, RN and physician present at bedside for extubation

## 2022-10-27 NOTE — ASSESSMENT & PLAN NOTE
On Simvastatin 40 mg at home--we do not carry this, patient with allergy" to alternative  · Resume home statin at discharge

## 2022-10-27 NOTE — ASSESSMENT & PLAN NOTE
Hx of lung adenocarcinoma s/p SBRT complicated by recurrent right pleural effusions likely 2/2 effects of radiation  · On most recent tap (04/2021) fluid found to be lymphocytic and exudative  · CXR on 10/26 showed chronic right-sided pleural effusion with mid-line shift  · Monitor for need for thoracentesis

## 2022-10-27 NOTE — RESPIRATORY THERAPY NOTE
RT Ventilator Management Note  Leandrew Pulse Sr  78 y o  male MRN: [id number]  Unit/Bed#: [facility] 03 Encounter: [id number]      Daily Screen         10/27/2022  0727 10/27/2022  0747          Patient safety screen outcome[de-identified] Passed Passed (P)       Spont breathing trial % for 30 min: -- --                Physical Exam:   Assessment Type: (P) During-treatment  General Appearance: (P) Awake, Alert  Respiratory Pattern: (P) Assisted  Chest Assessment: (P) Chest expansion symmetrical  Bilateral Breath Sounds: (P) Diminished  Cough: (P) Productive  Suction: (P) ET Tube  O2 Device: (P) G5      Resp Comments: (P) patient is awake and tolerating current settings, 8/6/40%, pt satting 100%, RN aware, will continue to monitor

## 2022-10-27 NOTE — ASSESSMENT & PLAN NOTE
Hx of COPD (on 2L at baseline), HFpEF, Lung adenocarcinoma, and recurrent right pleural effusions was found to be hypoxic on presentation  Initially, trialed on 6L O2 but did not improve and became more encephalopathic  Was intubated and admitted to ICU  Thought to be 2/2 CHF vs COPD exacerbation  Given IV lasix 40 mg x1 in ED with good urine output overnight  His oxygenation status improved and he was extubated    · Transferred out of ICU on 10/29  · Refusing to wear oxygen at times, education provided   · See chronic diastolic heart failure and COPD Assessment & Plans below

## 2022-10-27 NOTE — PROGRESS NOTES
Vancomycin IV Pharmacy-to-Dose Consultation    Jose Rivers is a 78 y o  male who is currently receiving vancomycin IV with management by the Pharmacy Consult service  Assessment/Plan:    The patient's chart was reviewed  There are no signs or symptoms of infusion reactions documented  Based on today's assessment, will continue current vancomycin (Day # 2) dosing of 1250 mg (15 mg/kg AdjBW) IV daily PRN when random vancomycin level is less than 20, with a plan for random level to be drawn 10/28 with AM labs    We will continue to follow the patient's culture results and clinical progress daily      Thank you,  Zacarias Drake, PharmD, [facility] Pharmacist  [phone]

## 2022-10-27 NOTE — PROGRESS NOTES
ICU Transfer Note:    Code Status: Level 1 - Full Code  POA:    POLST:      Reason for ICU admission:   Acute on chronic hypoxic hypercapnic respiratory failure    Active problems:   Principal Problem:    Acute on chronic respiratory failure with hypoxia and hypercapnia (HCC)  Active Problems:    COPD (chronic obstructive pulmonary disease) (HCC)    HLD (hyperlipidemia)    Type 2 diabetes mellitus with hyperglycemia, with long-term current use of insulin (HCC)    Pleural effusion on right    Chronic diastolic heart failure (HCC)    Essential hypertension    CAD (coronary artery disease)    Acute metabolic encephalopathy    Adenocarcinoma of lung (HCC)    History of prostate cancer    A-fib (HCC)    PUD (peptic ulcer disease)    Acute-on-chronic kidney injury ([facility] [address] )    SIRS (systemic inflammatory response syndrome) ([facility] [address] )    Acute on chronic anemia  Resolved Problems:    * No resolved hospital problems  *    Acute on chronic anemia  Assessment & Plan  -patient found to have hemoglobin dropped to 6 3 on 10/28  Was given 1 unit packed red blood cells with improvement to 8 1  Hemoglobin has thereafter remained stable  - given recent history of peptic ulcer and reported melanotic stool at home, GI was consulted  - no current plan for EGD/colonoscopy  - aspirin and Eliquis were restarted  - continue to monitor CBC  - transfuse for hemoglobin below 7    SIRS (systemic inflammatory response syndrome) ([facility] [address] [address] )  Assessment & Plan  -On presentation, patient was found to be meeting SIRS (tachycardia and tachypnea)  -Sepsis w/u was initiated  - UA showed innumerable WBC     - Blood cultures, urine cultures, and MRSA are pending    - Currently on Vancomycin and Cefepime => De-escalate Abx as tolerated     Acute-on-chronic kidney injury [facility])  Assessment & Plan  Lab Results   Component Value Date    EGFR 26 10/27/2022    EGFR 23 10/26/2022    EGFR 24 10/24/2022    CREATININE 2 33 (H) 10/27/2022    CREATININE 2 52 (H) 10/26/2022 CREATININE 2 50 (H) 10/24/2022     -Baseline Cr 1 6-1 8  -Creatinine has been above baseline since 09/2022 => Could be new baseline or initial insult never resolved  -Could be in setting of Cardiorenal disease  -Cr improved to 2 33 from 2 52 with IV Lasix 40 mg x1  -Will diurese further today  -Trend BMP  -Avoid nephrotoxins or hypotension    PUD (peptic ulcer disease)  Assessment & Plan  -On recent admission (10/16 to 10/24) was found to have ulcer on EGD  -patient found to have hemoglobin dropped to 6 3 on 10/28  Was given 1 unit packed red blood cells with improvement to 8 1  Hemoglobin has thereafter remained stable  - given recent history of peptic ulcer and reported melanotic stool at home, GI was consulted  - no current plan for EGD/colonoscopy  - continue with pantoprazole 40 mg b i d   - appreciate any further GI recommendations      A-fib (HCC)  Assessment & Plan  -On Eliquis 5 mg BID and Coreg 6 25 mg at home  -continue home Eliquis and Coreg    History of prostate cancer  Assessment & Plan  -Hx of Prostate cancer s/p brachytherapy and EBRT  -On Flomax 0 4 mg at home    Adenocarcinoma of lung (HCC)  Assessment & Plan  -Hx of Stage 1A2 lung adenocarcinoma s/p SBRT  -Follows with Pulmonology outpatient     Acute metabolic encephalopathy  Assessment & Plan  -On presentation, was found to be encephalopathic likely 2/2 hypercapnia from respiratory failure  -Now resolved after being intubated overnight and diuresed    CAD (coronary artery disease)  Assessment & Plan  -Has hx of MI s/p PCI  -Continue PTA aspirin 81 mg    Essential hypertension  Assessment & Plan  -On Coreg 6 24 BID and Imdur 30 mg daily PTA  -Will resume home medications     Chronic diastolic heart failure (HCC)  Assessment & Plan  Wt Readings from Last 3 Encounters:   10/27/22 101 kg (221 lb 12 5 oz)   10/23/22 95 8 kg (211 lb 3 2 oz)   10/15/22 98 7 kg (217 lb 11 1 oz)     -Last echo on 05/2022 showed EF 60%;  Moderately dilated RV, Mild AV and TV regurg  -At home is on Lasix 40 mg daily, Imdur 30 mg daily, and Coreg 6 25 mg BID  -On presentation, found to be in volume overloaded and was found to be acute hypoxic hypercapnic respiratory failure thought to be likely 2/2 CHF exacerbation  -Given IV Lasix 40 mg x1 in ED with 1 5 L urine output  -Still appears volume overloaded => Will plan for IV lasix 40 mg x1          Pleural effusion on right  Assessment & Plan  -Hx of lung adenocarcinoma s/p SBRT complicated by recurrent right pleural effusions likely 2/2 effects of radiation  -On most recent tap (04/2021) fluid found to be lymphocytic and exudative  -CXR on 10/26 showed chronic right-sided pleural effusion with mid-line shift  -No plans for Thoracentesis at this time    Type 2 diabetes mellitus with hyperglycemia, with long-term current use of insulin [facility])  Assessment & Plan  Lab Results   Component Value Date    HGBA1C 9 7 (H) 10/10/2022       Recent Labs     10/27/22  0812 10/27/22  0814 10/27/22  1051 10/27/22  1141   POCGLU 36* 221* 229* 227*       Blood Sugar Average: Last 72 hrs:  (P) 207 25     -Last A1c 9 7  -On most recent admission (10/24/22) was discharged on 40U lantus and Humalog 15U TID  -On presentation BG elevated to 500s  -On insulin gtt overnight and transitioned to SSI  -Can consider adding SubQ regimen once diet is established     HLD (hyperlipidemia)  Assessment & Plan  -On Simvastatin 40 mg at home  -Can consider restarting    COPD (chronic obstructive pulmonary disease) (HCC)  Assessment & Plan  -Hx of COPD with last PFTs showing FEV1/FVC 41%; FEV1 33%  -On 2L O2 at home  -On Incurse and Breo Ellipta at home  -Admitted for acute on chronic hypoxic hypercapnic respiratory failure thought to have component of COPD exacerbation  -Currently on Xopenex 1 25 mg TID, Atrovent 0 5 mg TID, Perforomist 20 mcg q12 and Pulmicort 0 5 mg q12    * Acute on chronic respiratory failure with hypoxia and hypercapnia (HCC)  Assessment & Plan  -Patient with hx of COPD (on 2L at baseline), HFpEF, Lung adenocarcinoma, and recurrent right pleural effusions was found to be hypoxic on presentation  Initially, trialed on 6L O2 but did not improve and became more encephalopathic  Was intubated overnight  Thought to be 2/2 CHF vs COPD exacerbation  Given IV lasix 40 mg x1 in ED with good urine output overnight  His oxygenation status improved and he was extubated  -Patient remains off vent on 3L O2 NC  -See chronic diastolic heart failure and COPD Assessment & Plan      Consultants:   Gastroenterology  History of Present Illness:    Bob Lemons Sr  is a 78 y o  male with PMH significant for COPD (FEV1/FVC 41%; FEV1 33%; 2L O2 at home), stage 1A2 lung adenocarcinoma s/p SBRT, recurrent right pleural effusion, HFpEF (EF 60%), pulmonary hypertension,  CAD (on aspirin and statin), MI s/p PCI, afib (on eliquis) Type II DM on insulin, JACQUELINE (on CPAP), GERD, HLD, HTN, and prostate cancer s/p brachytherapy and EBRTwho presents with respiratory distress  Unable to obtain hx due to patient being intubated  Per ED providers, patient presented with hypertension (systolics in 531B) and respiratory distress saturating in low 90s  Given Nitrogylcerin x2  Initially trialed on 6L O2  He was given Lasix 40 mg x1, Nebulizer treatment, Magsulfate x1, and IV Methylprednisolone 125 mg  He continued to have worsening of his respiratory status  Initially offered to be put on BiPAP but refused and asked "for the tube"  He was subsequently sedated and intubated  After induction of sedation, patients BP dropped to 10S systolics and subsequently stabilized without use of any pressors  In ED, labwork revealed Cr 2 52 (baseline 1 6-1 8), Glucose 530, Hgb 7 9 (b/l 7-8), BNP 6,159  Venous blood gas showed pH 7 20, pCO2 80 3, PaO2 80 3, and HCO3 30 8   CXR was obtained and showed chronic right-sided changes including midline shift, increased right effusion and chronic right base atelectasis  Patient has had multiple recent admissions within the last year  His most recent admission was from 10/16-10/24 for acute hypoxic respiratory failure requiring O2 supplementation by nasal cannula  Initially thought to be a COPD exacerbation, however per Pulmonology did not believe to be an exacerbation and suggesting finishing course of prednisone from previous COPD exacerbation and continuing home inhaler therapy  Also found to have bleeding ulcer on EGD  Found to have fluctuating blood glucose readings and was discharged on Lantus 40U qhs and Lispro 15 U TID  Follows outpatient with Pulmonology for lung adenocarcinoma and severe COPD  Last seen in 04/2021  Most recent PET revealed  patchy radiotracer uptake in the right upper lobe at the site of the previously seen pulmonary lesion likely related to posttreatment changes       Summary of clinical course:   Patient was intubated in ED  Also received IV Lasix 40 mg  Had 1 5 L urine output  Respiratory status improved and patient was seen fit for extubation  Patient was extubated initially onto 6L O2 NC and was weaned down to 3L O2  Patient was diuresed further with IV Lasix 40 mg x 2 with good urinary output  On presentation, patient was found to be meeting SIRS (tachycardia and tachypnea)  Sepsis w/u was initiated  Urine cultures grew Klebsiella  Patient started on ceftriaxone     Also found to be hyperglycemic with blood glucose in 500s  Was on insulin gtt overnight and was transitioned to SSI  Patient found to have acute drop in hemoglobin to 6 3  Reported having melanotic stool prior to admission  1 unit packed red blood cells administered with appropriate response  GI was consulted and did not recommend pursuing endoscopy at this time unless there is further drop in hemoglobin or hemodynamic instability  Patient's hemoglobin remained stable  Patient did have brief episode of bradycardia to 40s    EKG was significant for first-degree AV block and right bundle branch block which was seen on prior EKGs  No interventions were were performed  Aspirin and Eliquis restarted  Patient was found to be stable for transfer to [facility] with telemetry under SLIM service  Cultures:   Blood Cx, Urine Cx, and MRSA          Invasive Devices Review  Invasive Devices  Report    Peripheral Intravenous Line  Duration           Peripheral IV 10/26/22 Left Antecubital 3 days    Peripheral IV 10/26/22 Right Wrist 3 days                  VTE Pharmacologic Prophylaxis: None  VTE Mechanical Prophylaxis: sequential compression device    Spoke with Dr Marybeth Lobo  regarding transfer  Please contact critical care via [employer] with any questions or concerns  Portions of the record may have been created with voice recognition software  Occasional wrong word or "sound a like" substitutions may have occurred due to the inherent limitations of voice recognition software  Read the chart carefully and recognize, using context, where substitutions have occurred

## 2022-10-27 NOTE — ASSESSMENT & PLAN NOTE
Hx of COPD with last PFTs showing FEV1/FVC 41%; FEV1 33%  · On 2L O2 at home  · On Incruse and Tiney Rotunda at home  · Admitted for acute on chronic hypoxic hypercapnic respiratory failure thought to have component of COPD exacerbation  · Currently on Xopenex 1 25 mg TID, Atrovent 0 5 mg TID, Perforomist 20 mcg q12 and Pulmicort 0 5 mg q12  · Per ICU/pulmonology transfer note, monitor off systemic steroids for now, recently completed steroid course several days ago for COPD exacerbation

## 2022-10-27 NOTE — PROGRESS NOTES
Daily Progress Note - Critical Care   Bobby Mane Sr  78 y o  male MRN: [id number]  Unit/Bed#: MICU 03 Encounter: [id number]        ----------------------------------------------------------------------------------------  HPI/24hr events: "Erlin Jorge  is a 78 y o  male with PMH significant for COPD (FEV1/FVC 41%; FEV1 33%; 2L O2 at home), stage 1A2 lung adenocarcinoma s/p SBRT, recurrent right pleural effusion, HFpEF (EF 60%), pulmonary hypertension,  CAD (on aspirin and statin), MI s/p PCI, afib (on eliquis) Type II DM on insulin, JACQUELINE (on CPAP), GERD, HLD, HTN, and prostate cancer s/p brachytherapy and EBRTwho presents with respiratory distress  Unable to obtain hx due to patient being intubated  Per ED providers, patient presented with hypertension (systolics in 970K) and respiratory distress saturating in low 90s  Given Nitrogylcerin x2  Initially trialed on 6L O2  He was given Lasix 40 mg x1, Nebulizer treatment, Magsulfate x1, and IV Methylprednisolone 125 mg  He continued to have worsening of his respiratory status  Initially offered to be put on BiPAP but refused and asked "for the tube"  He was subsequently sedated and intubated  After induction of sedation, patients BP dropped to 97R systolics and subsequently stabilized without use of any pressors "      24 hour events:  · Yesterday, patient was intubated  Initially on propofol but transition to Precedex and fentanyl  Was given IV Lasix 40 mg in the ED  He was also started on insulin drip for hyperglycemia    · Overnight, patient was transitioned off propofol and fentanyl was increased to 100  · He was found to be bradycardic  Initially his Precedex was decreased, however he did not tolerate it    He was subsequently given glycopyrrolate which help normalize his heart rate  · Patient was transition to sliding scale insulin overnight  · No further diuretics were given    ---------------------------------------------------------------------------------------  SUBJECTIVE  Patient is intubated but able to follow commands  Denies any pain  Review of Systems   Unable to perform ROS: Intubated     Review of systems was unable to be performed secondary to Patient being intubated  ---------------------------------------------------------------------------------------  Assessment and Plan:      Assessment:     1)  Acute hypoxic hypercapnic respiratory failure (likely 2/2 COPD exacerbation vs CHF exacerbation)  2)  SIRS (tachycardia and tachypnea)  3)  Insulin dependent Type II DM  4)  MARANDA on CKD  5)  Stage 1A2 Lung adenocarcinoma  6)  COPD  7)  HFpEF  8)  Recurrent Left Pleural Effusions   9)  CAD s/p PCI  10)  Hypertension  11)  Normocytic anemia  12)  Peptic Ulcer Disease        Neuro:   -Diagnosis: Sedation   Initially on Propofol in ED   Currently on Precedex would 0 3 and fentanyl 75   Will wean sedation as tolerated     CV:   - Diagnosis: HpEF   Most recent Echo in 05/2022 showed EF 60% with some RV dilation   Patient presenting with acute hypoxic hypercapnic respiratory failure  Still appearing slightly volume overloaded   May give another dose of IV Lasix 40 mg      - Diagnosis: CAD c/p MI s/p PCI   Plan: Restart home aspirin and plavix      - Diagnosis: Hypertension   On Carvedilol and Imdur at home   Will hold antihypertensives at this time      - Diagnosis: Afib   At home on Eliquis 5 mg BID     Pulm:  - Diagnosis: Acute hypoxic hypercapnic respiratory failure   Could be in setting of CHF exacerbation vs COPD exacerbation vs Worsening of right pleural effusion   Patient currently on volume control  Patient is able to follow commands and res be less than 150 on FiO2 40% and PEEP 6    Will plan for spontaneous breathing trial  - Diagnosis: COPD   Plan: On home is on Incruse Ellipta and Breo Ellipta   Continue with albuterol, Atrovent, sodium chloride nebs   Continue with Pulmicort and Perforomist   Continue with Atrovent and Xopenex  - Diagnosis: Lung Adenocarcinoma   Hx of Stage 1A2 lung adenocarcinoma s/p SBRT; Follows with Pulm outpatient   Diagnosis: Recurrent Right Pleural Effusion   Can consider thoracentesis if poor response with diuresis     GI:   - Diagnosis: Peptic Ulcer Disease    Found to have ulcer on EGD on previous admission   Will continue with IV Protonix 40 mg BID        :   - Diagnosis: MARANDA on CKD   Plan: Creatinine elevated to 2 5 from 1 6-1 8 likely in s/o cardiorenal syndrome    Creatinine continues to be elevated to 2 33 today   Will plan for diuresis   Trend creatinine  - Maintain Cook for strict Is and Os    F/E/N:   Plan: Currently NPO without any fluid running        Heme/Onc:   -Diagnosis: Normocytic Anemia   Hgb stable at 7 9   On Ferrous Sulfate 325 mg at home   Continue to trend CBC  - Diagnosis: Prostate Cancer    S/p brachytherapy and EBRT   On Tamsulosin 0 4 mg at home        Endo:   - Diagnosis: HHS   Hx of Insulin Dependent Type II DM  On recent admission was found to have uncontrolled BG and was put on Lantus 40U and Humalog 15U TID   Glucose elevated to 530 on admission   Initially on insulin drip and was transitioned to SSI   Can consider transitioning to subcutaneous insulin regimen once diet is established      ID:   - Diagnosis: SIRS   Tachycardic and Tachypnic on presentation   Lactate elevated to 3 5   CXR did not reveal any evidence of PNA   Strep pneumo and Legionella urinary antigens negative; sputum culture negative   Blood cultures, MRSA cultured and urine culture still pending   Currently on vanc and cefepime   Trend fever curve and WBC  MSK/Skin:   · Turning, Wound care and SCDs    Patient appropriate for transfer out of the ICU today?: No  Disposition: Continue Critical Care   Code Status: Level 1 - Full Code  ---------------------------------------------------------------------------------------  ICU CORE MEASURES    Prophylaxis   VTE Pharmacologic Prophylaxis: None  VTE Mechanical Prophylaxis: sequential compression device  Stress Ulcer Prophylaxis: Pantoprazole IV     ABCDE Protocol (if indicated)  Plan to perform spontaneous awakening trial today? Yes  Plan to perform spontaneous breathing trial today? Yes  Obvious barriers to extubation? Yes  CAM-ICU:  N/a    Invasive Devices Review  Invasive Devices  Report    Peripheral Intravenous Line  Duration           Peripheral IV 10/26/22 Left Antecubital <1 day    Peripheral IV 10/26/22 Right Antecubital <1 day    Peripheral IV 10/26/22 Right Wrist <1 day          Drain  Duration           NG/OG/Enteral Tube Orogastric 14 Fr Center mouth <1 day    Urethral Catheter Double-lumen 18 Fr  <1 day          Airway  Duration           ETT  8 mm <1 day              Can any invasive devices be discontinued today? Not applicable  ---------------------------------------------------------------------------------------  OBJECTIVE    Vitals   Vitals:    10/27/22 0415 10/27/22 0500 10/27/22 0600 10/27/22 0700   BP: 105/57 108/56 101/53 105/52   BP Location: Right arm      Pulse: 70 68 72 68   Resp: 14 20 21 15   Temp:    97 5 °F (36 4 °C)   TempSrc:    Axillary   SpO2: 100% 100% 100% 100%   Weight:   101 kg (221 lb 12 5 oz)      Temp (24hrs), Av °F (36 7 °C), Min:97 5 °F (36 4 °C), Max:99 3 °F (37 4 °C)  Current: Temperature: 97 5 °F (36 4 °C)    Respiratory:  SpO2: SpO2: 100 %  Nasal Cannula O2 Flow Rate (L/min): 6 L/min    Invasive/non-invasive ventilation settings   Respiratory  Report   Lab Data (Last 4 hours)    None         O2/Vent Data (Last 4 hours)    None                Physical Exam  Constitutional:       Appearance: He is ill-appearing  HENT:      Head: Normocephalic and atraumatic  Mouth/Throat:      Mouth: Mucous membranes are dry  Pharynx: Oropharynx is clear  Cardiovascular:      Rate and Rhythm: Normal rate and regular rhythm  Heart sounds:  No murmur heard  No friction rub  No gallop  Pulmonary:      Breath sounds: Rales present  No wheezing  Comments: Faint rales appreciated in bibasilar lung fields   Abdominal:      General: There is no distension  Palpations: Abdomen is soft  Musculoskeletal:      Right lower leg: Edema present  Left lower leg: Edema present  Comments: 2+ Pitting edema in bilateral lower extremities tracking up to knee   Skin:     Findings: Bruising present  Comments: Upper extremities warm; Lower extremities cold   Neurological:      Comments: Patient is intubated but able to follow commands; able to lift head off bed             Laboratory and Diagnostics:  Results from last 7 days   Lab Units 10/27/22  0431 10/26/22  1230 10/26/22  1213 10/24/22  0559 10/23/22  0457 10/22/22  1802 10/22/22  0908 10/22/22  7687 10/21/22  1654 10/21/22  0812 10/20/22  1632 10/20/22  1056   WBC Thousand/uL 6 76 8 47  --  8 02 8 87  --   --  9 91  --  12 33*  --  9 86   HEMOGLOBIN g/dL 7 0* 7 9*  --  7 3* 7 8* 7 9* 8 3* 7 4*   < > 7 3*   < > 6 6*   I STAT HEMOGLOBIN g/dl  --   --  7 8*  --   --   --   --   --   --   --   --   --    HEMATOCRIT % 22 5* 25 6*  --  23 1* 24 4* 24 2* 25 7* 22 6*   < > 22 3*   < > 20 8*   HEMATOCRIT, ISTAT %  --   --  23*  --   --   --   --   --   --   --   --   --    PLATELETS Thousands/uL 124* 127*  --  89* 98*  --   --  88*  --  110*  --  117*   NEUTROS PCT % 91* 86*  --   --  78*  --   --  84*  --   --   --  79*   MONOS PCT % 5 6  --   --  10  --   --  9  --   --   --  10    < > = values in this interval not displayed       Results from last 7 days   Lab Units 10/27/22  0431 10/26/22  1230 10/26/22  1213 10/24/22  0559 10/23/22  0457 10/22/22  0632 10/21/22  0812   SODIUM mmol/L 140 136  --  134* 139 140 140   POTASSIUM mmol/L 5 1 5 5*  --  4 4 3 9 3 9 4 7   CHLORIDE mmol/L 106 103  --  106 104 106 105   CO2 mmol/L 34* 27  --  21 30 29 29   CO2, I-STAT mmol/L  --   --  32  --   --   --   -- ANION GAP mmol/L 0* 6  --  7 5 5 6   BUN mg/dL 51* 49*  --  58* 67* 72* 91*   CREATININE mg/dL 2 33* 2 52*  --  2 50* 2 39* 2 20* 2 44*   CALCIUM mg/dL 8 8 8 1*  --  7 6* 7 8* 7 9* 7 9*   GLUCOSE RANDOM mg/dL 142* 530*  --  95 61* 43* 159*   ALT U/L 23 30  --   --   --   --   --    AST U/L 10 20  --   --   --   --   --    ALK PHOS U/L 77 100  --   --   --   --   --    ALBUMIN g/dL 2 2* 2 2*  --   --   --   --   --    TOTAL BILIRUBIN mg/dL 0 23 0 29  --   --   --   --   --      Results from last 7 days   Lab Units 10/27/22  0431   MAGNESIUM mg/dL 2 7*   PHOSPHORUS mg/dL 5 2*               Results from last 7 days   Lab Units 10/26/22  1717 10/26/22  1404   LACTIC ACID mmol/L 1 3 3 5*     ABG:  Results from last 7 days   Lab Units 10/26/22  1603   PH ART  7 394   PCO2 ART mm Hg 41 6   PO2 ART mm Hg 140 0*   HCO3 ART mmol/L 24 8   BASE EXC ART mmol/L -0 1   ABG SOURCE  Radial, Left     VBG:  Results from last 7 days   Lab Units 10/26/22  1603 10/26/22  1413 10/26/22  1404   PH MAYE   --   --  7 234*   PCO2 MAYE mm Hg  --   --  76 9*   PO2 MAYE mm Hg  --   --  55 8*   HCO3 MAYE mmol/L  --   --  31 8*   BASE EXC MAYE mmol/L  --   --  3 3   ABG SOURCE  Radial, Left   < >  --     < > = values in this interval not displayed  Results from last 7 days   Lab Units 10/26/22  1409   PROCALCITONIN ng/ml 0 21       Micro  Results from last 7 days   Lab Units 10/26/22  1532 10/26/22  1528 10/26/22  1527   BLOOD CULTURE   --  Received in Microbiology Lab  Culture in Progress  Received in Microbiology Lab  Culture in Progress  --    GRAM STAIN RESULT   --   --  No Polys or Bacteria seen   LEGIONELLA URINARY ANTIGEN  Negative  --   --    STREP PNEUMONIAE ANTIGEN, URINE  Negative  --   --        Imaging:  I have personally reviewed pertinent reports        Intake and Output  I/O       10/25 0701  10/26 0700 10/26 0701  10/27 0700 10/27 0701  10/28 0700    I V  (mL/kg)  369 7 (3 7)     IV Piggyback  400     Total Intake(mL/kg)  769 7 (7 6)     Urine (mL/kg/hr)  1575     Emesis/NG output  500     Total Output  2075     Net  -1305 3                    Height and Weights         Body mass index is 30 08 kg/m²  Weight (last 2 days)     Date/Time Weight    10/27/22 0600 101 (221 78)            Nutrition       Diet Orders   (From admission, onward)             Start     Ordered    10/26/22 1357  Diet NPO  Diet effective now        References:    Nutrtion Support Algorithm Enteral vs  Parenteral   Question Answer Comment   Diet Type NPO    RD to adjust diet per protocol?  Yes        10/26/22 1357                    Active Medications  Scheduled Meds:  Current Facility-Administered Medications   Medication Dose Route Frequency Provider Last Rate   • albuterol  10 mg Nebulization Once Rocio Ngo DO      And   • ipratropium  1 mg Nebulization Once Rocio Ngo DO      And   • sodium chloride  3 mL Nebulization Once Rocio Ngo DO     • budesonide  0 5 mg Nebulization Q12H Watt Grace Maxwell DO     • cefepime  2,000 mg Intravenous Z82L Gladystine Kunal, CRNP 2,000 mg (10/27/22 0612)   • chlorhexidine  15 mL Mouth/Throat E95F [address] Gladystine Kunal, CRNP     • dexmedetomidine  0 1-0 7 mcg/kg/hr Intravenous Titrated Gladystine Kunal, CRNP 0 3 mcg/kg/hr (10/27/22 0176)   • fentaNYL  75 mcg/hr Intravenous Continuous Lui Mobley MD 75 mcg/hr (10/27/22 0255)   • fentanyl citrate (PF)  100 mcg Intravenous Once Lui Mobley MD     • fentanyl citrate (PF)  100 mcg Intravenous Q2H PRN Lui Mobley MD     • formoterol  20 mcg Nebulization Q12H Derek Freeman DO     • insulin lispro  1-6 Units Subcutaneous Q6H [address] Lui Mobley MD     • ipratropium  0 5 mg Nebulization TID Derek Freeman DO     • levalbuterol  1 25 mg Nebulization TID Derek Freeman DO     • pantoprazole  40 mg Intravenous Q12H [address] Wendi Inman MD     • propofol  5-50 mcg/kg/min Intravenous Titrated Evy Reina DO Stopped (10/26/22 1813)   • vancomycin  15 mg/kg Intravenous Z74S YOLANDE Carrington       Continuous Infusions:  dexmedetomidine, 0 1-0 7 mcg/kg/hr, Last Rate: 0 3 mcg/kg/hr (10/27/22 0610)  fentaNYL, 75 mcg/hr, Last Rate: 75 mcg/hr (10/27/22 0255)  propofol, 5-50 mcg/kg/min, Last Rate: Stopped (10/26/22 1813)      PRN Meds:   fentanyl citrate (PF), 100 mcg, Q2H PRN        Allergies   Allergies   Allergen Reactions   • Crestor [Rosuvastatin] Other (See Comments)     Unknown     • Lisinopril GI Intolerance, Dizziness and Abdominal Pain   • Metformin GI Intolerance     ---------------------------------------------------------------------------------------  Advance Directive and Living Will:      Power of :    POLST:    ---------------------------------------------------------------------------------------    Rachele Gaxiola MD      Portions of the record may have been created with voice recognition software  Occasional wrong word or "sound a like" substitutions may have occurred due to the inherent limitations of voice recognition software    Read the chart carefully and recognize, using context, where substitutions have occurred

## 2022-10-27 NOTE — ASSESSMENT & PLAN NOTE
Wt Readings from Last 3 Encounters:   10/27/22 101 kg (221 lb 12 5 oz)   10/23/22 95 8 kg (211 lb 3 2 oz)   10/15/22 98 7 kg (217 lb 11 1 oz)     Last echo on 05/2022 showed EF 60%; Moderately dilated RV, Mild AV and TV regurg  · At home is on Lasix 40 mg daily, Imdur 30 mg daily, and Coreg 6 25 mg BID  · On presentation, found to be volume overloaded   · S/p several doses of IV lasix--will give another today 10/30, plan to transition to home dose p o   Lasix possibly on 10/31

## 2022-10-27 NOTE — ASSESSMENT & PLAN NOTE
On presentation, was found to be encephalopathic likely 2/2 hypercapnia from respiratory failure  · Now resolved after being intubated and diuresed  · Monitor mental status

## 2022-10-27 NOTE — PROGRESS NOTES
Daily Progress Note - Critical Care   Mita Holman Sr  78 y o  male MRN: [id number]  Unit/Bed#: MICU 03 Encounter: [id number]        ----------------------------------------------------------------------------------------  HPI/24hr events: " Pamela Saavedra  is a 78 y o  male with PMH significant for COPD (FEV1/FVC 41%; FEV1 33%; 2L O2 at home), stage 1A2 lung adenocarcinoma s/p SBRT, recurrent right pleural effusion, HFpEF (EF 60%), pulmonary hypertension,  CAD (on aspirin and statin), MI s/p PCI, afib (on eliquis) Type II DM on insulin, JACQUELINE (on CPAP), GERD, HLD, HTN, and prostate cancer s/p brachytherapy and EBRTwho presents with respiratory distress  Unable to obtain hx due to patient being intubated  Per ED providers, patient presented with hypertension (systolics in 762K) and respiratory distress saturating in low 90s  Given Nitrogylcerin x2  Initially trialed on 6L O2  He was given Lasix 40 mg x1, Nebulizer treatment, Magsulfate x1, and IV Methylprednisolone 125 mg  He continued to have worsening of his respiratory status  Initially offered to be put on BiPAP but refused and asked "for the tube"  He was subsequently sedated and intubated  After induction of sedation, patients BP dropped to 26F systolics and subsequently stabilized without use of any pressors "      24 Hour Events:  -Yesterday, extubated onto 2L O2 NC  -Given IV Lasix 40 mg x1    ---------------------------------------------------------------------------------------  SUBJECTIVE  This morning, patient states that he is SOB after walking to bathroom, says his is his baseline  Denies any other acute complaints     Review of Systems  Review of systems was reviewed and negative unless stated above in HPI/24-hour events   ---------------------------------------------------------------------------------------  Assessment and Plan:    Assessment:    1)   Acute hypoxic hypercapnic respiratory failure (likely 2/2 COPD exacerbation vs CHF exacerbation)  2)  SIRS (tachycardia and tachypnea) likely 2/2 UTI  3)  Insulin dependent Type II DM  4)  MARANDA on CKD  5)  Stage 1A2 Lung adenocarcinoma  6)  COPD  7)  HFpEF  8)  Recurrent Left Pleural Effusions   9)  CAD s/p PCI  10)  Hypertension  11)  Normocytic anemia  12)  Peptic Ulcer Disease       Neuro:   -No active issues   CV:   - Diagnosis: HpEF              Most recent Echo in 05/2022 showed EF 60% with some RV dilation              Patient presenting with acute hypoxic hypercapnic respiratory failure  Net positive yesterday and still has evidence of pulm edema              IV Lasix 40 mg x1 today                 - Diagnosis: CAD c/p MI s/p PCI              Plan: Restart home aspirin   Hold statin     - Diagnosis: Hypertension              Continue Home Carvedilol and Imdur         - Diagnosis: Afib              At home on Eliquis 5 mg BID   Consider restarting once Hgb stable     Pulm:  - Diagnosis: Acute hypoxic hypercapnic respiratory failure              Could be in setting of CHF exacerbation vs COPD exacerbation vs Worsening of right pleural effusion   Resolved, patient is back on 2L O2   - Diagnosis: COPD              Plan: On home is on Incruse Ellipta and Breo Ellipta              Continue with albuterol, Atrovent, sodium chloride nebs              Continue with Pulmicort and Perforomist              Continue with Atrovent and Xopenex  - Diagnosis: Lung Adenocarcinoma              Hx of Stage 1A2 lung adenocarcinoma s/p SBRT;  Follows with Pulm outpatient              Diagnosis: Recurrent Right Pleural Effusion              Can consider thoracentesis if poor response with diuresis     GI:   - Diagnosis: Peptic Ulcer Disease               Found to have ulcer on EGD on previous admission              Will continue with PO Protonix 40 mg BID  -Bowel regimen: Senna and Miralax PRN        :   - Diagnosis: MARANDA on CKD              Plan: Creatinine elevated to 2 5 from 1 6-1 8 likely in s/o cardiorenal syndrome               Creatinine continues to be elevated to 2 5 today              Will plan for diuresis              Trend creatinine  - Maintain Cook for strict Is and Os     F/E/N:   Plan: Carb controlled and fluid restricted diet        Heme/Onc:   -Diagnosis: Normocytic Anemia              Hgb decreased to 6 7 today => Will repeat   Had positive FOBT two weeks prior              On Ferrous Sulfate 325 mg at home              Continue to trend CBC  - Diagnosis: Prostate Cancer               S/p brachytherapy and EBRT              On Tamsulosin 0 4 mg at home        Endo:   - Diagnosis: HHS              Hx of Insulin Dependent Type II DM  On recent admission was found to have uncontrolled BG and was put on Lantus 40U and Humalog 15U TID              Glucose elevated to 530 on admission              Initially on insulin drip and was transitioned to SSI            Started on Lantus 20 U yesterday and continues to have elevated fasting glucose   Consider increasing Lantus 30 U qhs and adding 5U humalog for meal-time coverage     ID:   - Diagnosis: UTI   Patient with urine cx positive for > 100,000 Klebsiella    Continue with Cefepime and deescalate as cultures result         MSK/Skin:   - Turning, Wound care and SCDs  Patient appropriate for transfer out of the ICU today?: No  Disposition: Continue Critical Care   Code Status: Level 1 - Full Code  ---------------------------------------------------------------------------------------  ICU CORE MEASURES    Prophylaxis   VTE Pharmacologic Prophylaxis: Held  VTE Mechanical Prophylaxis: sequential compression device  Stress Ulcer Prophylaxis: Pantoprazole PO    ABCDE Protocol (if indicated)  Plan to perform spontaneous awakening trial today? Not applicable  Plan to perform spontaneous breathing trial today? Not applicable  Obvious barriers to extubation?  Not applicable  CAM-ICU: N/A    Invasive Devices Review  Invasive Devices  Report Peripheral Intravenous Line  Duration           Peripheral IV 10/26/22 Left Antecubital 1 day    Peripheral IV 10/26/22 Right Antecubital 1 day    Peripheral IV 10/26/22 Right Wrist 1 day          Drain  Duration           NG/OG/Enteral Tube Orogastric 14 Fr Center mouth 1 day    Urethral Catheter Double-lumen 18 Fr  1 day              Can any invasive devices be discontinued today? No  ---------------------------------------------------------------------------------------  OBJECTIVE    Vitals   Vitals:    10/27/22 1200 10/27/22 1300 10/27/22 1400 10/27/22 1403   BP: 121/64 (!) 96/49 97/50    BP Location:       Pulse: 64 64 98    Resp: 15 15 (!) 23    Temp: 97 7 °F (36 5 °C)      TempSrc: Oral      SpO2: 100% 100% (!) 61% 96%   Weight:         Temp (24hrs), Av 7 °F (36 5 °C), Min:97 5 °F (36 4 °C), Max:98 3 °F (36 8 °C)  Current: Temperature: 97 7 °F (36 5 °C)    Respiratory:  SpO2: SpO2: 98 %  Nasal Cannula O2 Flow Rate (L/min): 3 L/min    Invasive/non-invasive ventilation settings   Respiratory  Report   Lab Data (Last 4 hours)    None         O2/Vent Data (Last 4 hours)    None                Physical Exam  Constitutional:       Appearance: He is ill-appearing  HENT:      Head: Normocephalic and atraumatic  Mouth/Throat:      Mouth: Mucous membranes are dry  Pharynx: Oropharynx is clear  Cardiovascular:      Rate and Rhythm: Normal rate and regular rhythm  Heart sounds: No murmur heard  No friction rub  No gallop  Pulmonary:      Breath sounds: Rales present  No wheezing  Comments: Faint rales and decreased breath sounds appreciated in bibasilar lung fields   Abdominal:      General: There is no distension  Palpations: Abdomen is soft  Musculoskeletal:      Right lower leg: Edema present  Left lower leg: Edema present  Comments: 1+ Pitting edema in bilateral lower extremities    Skin:     Findings: Bruising present  Comments: Upper extremities warm;  Lower extremities cold   Neurological:      Mental Status: He is oriented to person, place, and time  Laboratory and Diagnostics:  Results from last 7 days   Lab Units 10/27/22  0431 10/26/22  1230 10/26/22  1213 10/24/22  0559 10/23/22  0457 10/22/22  1802 10/22/22  0908 10/22/22  0632 10/21/22  1654 10/21/22  0812   WBC Thousand/uL 6 76 8 47  --  8 02 8 87  --   --  9 91  --  12 33*   HEMOGLOBIN g/dL 7 0* 7 9*  --  7 3* 7 8* 7 9* 8 3* 7 4*   < > 7 3*   I STAT HEMOGLOBIN g/dl  --   --  7 8*  --   --   --   --   --   --   --    HEMATOCRIT % 22 5* 25 6*  --  23 1* 24 4* 24 2* 25 7* 22 6*   < > 22 3*   HEMATOCRIT, ISTAT %  --   --  23*  --   --   --   --   --   --   --    PLATELETS Thousands/uL 124* 127*  --  89* 98*  --   --  88*  --  110*   NEUTROS PCT % 91* 86*  --   --  78*  --   --  84*  --   --    MONOS PCT % 5 6  --   --  10  --   --  9  --   --     < > = values in this interval not displayed       Results from last 7 days   Lab Units 10/27/22  0431 10/26/22  1230 10/26/22  1213 10/24/22  0559 10/23/22  0457 10/22/22  0632 10/21/22  0812   SODIUM mmol/L 140 136  --  134* 139 140 140   POTASSIUM mmol/L 5 1 5 5*  --  4 4 3 9 3 9 4 7   CHLORIDE mmol/L 106 103  --  106 104 106 105   CO2 mmol/L 34* 27  --  21 30 29 29   CO2, I-STAT mmol/L  --   --  32  --   --   --   --    ANION GAP mmol/L 0* 6  --  7 5 5 6   BUN mg/dL 51* 49*  --  58* 67* 72* 91*   CREATININE mg/dL 2 33* 2 52*  --  2 50* 2 39* 2 20* 2 44*   CALCIUM mg/dL 8 8 8 1*  --  7 6* 7 8* 7 9* 7 9*   GLUCOSE RANDOM mg/dL 142* 530*  --  95 61* 43* 159*   ALT U/L 23 30  --   --   --   --   --    AST U/L 10 20  --   --   --   --   --    ALK PHOS U/L 77 100  --   --   --   --   --    ALBUMIN g/dL 2 2* 2 2*  --   --   --   --   --    TOTAL BILIRUBIN mg/dL 0 23 0 29  --   --   --   --   --      Results from last 7 days   Lab Units 10/27/22  0431   MAGNESIUM mg/dL 2 7*   PHOSPHORUS mg/dL 5 2*               Results from last 7 days   Lab Units 10/26/22  4457 10/26/22  1404   LACTIC ACID mmol/L 1 3 3 5*     ABG:  Results from last 7 days   Lab Units 10/26/22  1603   PH ART  7 394   PCO2 ART mm Hg 41 6   PO2 ART mm Hg 140 0*   HCO3 ART mmol/L 24 8   BASE EXC ART mmol/L -0 1   ABG SOURCE  Radial, Left     VBG:  Results from last 7 days   Lab Units 10/26/22  1603 10/26/22  1413 10/26/22  1404   PH MAYE   --   --  7 234*   PCO2 MAYE mm Hg  --   --  76 9*   PO2 MAYE mm Hg  --   --  55 8*   HCO3 MAYE mmol/L  --   --  31 8*   BASE EXC MAYE mmol/L  --   --  3 3   ABG SOURCE  Radial, Left   < >  --     < > = values in this interval not displayed  Results from last 7 days   Lab Units 10/26/22  1409   PROCALCITONIN ng/ml 0 21       Micro  Results from last 7 days   Lab Units 10/26/22  1532 10/26/22  1528 10/26/22  1527   BLOOD CULTURE   --  Received in Microbiology Lab  Culture in Progress  Received in Microbiology Lab  Culture in Progress  --    SPUTUM CULTURE   --   --  Culture too young- will reincubate   GRAM STAIN RESULT   --   --  No Polys or Bacteria seen   LEGIONELLA URINARY ANTIGEN  Negative  --   --    STREP PNEUMONIAE ANTIGEN, URINE  Negative  --   --        Imaging:  I have personally reviewed pertinent reports  Intake and Output  I/O       10/25 0701  10/26 0700 10/26 0701  10/27 0700 10/27 0701  10/28 0700    P  O    960    I V  (mL/kg)  369 7 (3 7) 63 (0 6)    IV Piggyback  400     Total Intake(mL/kg)  769 7 (7 6) 1023 (10 1)    Urine (mL/kg/hr)  1575 1125 (1 1)    Emesis/NG output  500     Total Output  2075 1125    Net  -1305 3 -102                   Height and Weights         Body mass index is 30 08 kg/m²    Weight (last 2 days)     Date/Time Weight    10/27/22 0600 101 (221 78)            Nutrition       Diet Orders   (From admission, onward)             Start     Ordered    10/27/22 1345  Diet Cardiovascular; Cardiac; Fluid Restriction 2000 ML, Consistent Carbohydrate Diet Level 2 (5 carb servings/75 grams CHO/meal)  Diet effective now        References: Nutrtion Support Algorithm Enteral vs  Parenteral   Question Answer Comment   Diet Type Cardiovascular    Cardiac Cardiac    Other Restriction(s): Fluid Restriction 2000 ML    Other Restriction(s): Consistent Carbohydrate Diet Level 2 (5 carb servings/75 grams CHO/meal)    RD to adjust diet per protocol?  Yes        10/27/22 1345                    Active Medications  Scheduled Meds:  Current Facility-Administered Medications   Medication Dose Route Frequency Provider Last Rate   • aspirin  81 mg Oral Daily Omari Madrid MD     • budesonide  0 5 mg Nebulization Q12H Lydia Downing DO     • carvedilol  6 25 mg Oral BID With Meals YOLANDE Awad     • cefepime  2,000 mg Intravenous O32P YOLANDE Awad 2,000 mg (10/27/22 1620)   • chlorhexidine  15 mL Mouth/Throat E90Q [facility] YOLANDE Awad     • dexmedetomidine  0 1-0 7 mcg/kg/hr Intravenous Titrated YOLANDE Awad Stopped (10/27/22 1251)   • fentaNYL  75 mcg/hr Intravenous Continuous Yana Treviño MD Stopped (10/27/22 0097)   • fentanyl citrate (PF)  100 mcg Intravenous Q2H PRN Yana Treviño MD     • formoterol  20 mcg Nebulization Q12H Clarence Maxwell DO     • insulin glargine  20 Units Subcutaneous HS YOLANDE Awad     • insulin lispro  2-12 Units Subcutaneous TID AC YOLANDE Awad     • ipratropium  0 5 mg Nebulization TID Lydia Downing DO     • isosorbide mononitrate  30 mg Oral Daily YOLANDE Awad     • levalbuterol  1 25 mg Nebulization TID Lydia Downing DO     • pantoprazole  40 mg Oral BID AC YOLANDE Awad     • propofol  5-50 mcg/kg/min Intravenous Titrated Waldemar Ngo DO Stopped (10/26/22 1813)   • vancomycin  15 mg/kg (Adjusted) Intravenous Daily PRN Clarence Maxwell DO       Continuous Infusions:  dexmedetomidine, 0 1-0 7 mcg/kg/hr, Last Rate: Stopped (10/27/22 1251)  fentaNYL, 75 mcg/hr, Last Rate: Stopped (10/27/22 0738)  propofol, 5-50 mcg/kg/min, Last Rate: Stopped (10/26/22 1813)      PRN Meds:   fentanyl citrate (PF), 100 mcg, Q2H PRN  vancomycin, 15 mg/kg (Adjusted), Daily PRN        Allergies   Allergies   Allergen Reactions   • Crestor [Rosuvastatin] Other (See Comments)     Unknown     • Lisinopril GI Intolerance, Dizziness and Abdominal Pain   • Metformin GI Intolerance     ---------------------------------------------------------------------------------------  Advance Directive and Living Will:      Power of :    POLST:    ---------------------------------------------------------------------------------------      Rebecca Washington MD      Portions of the record may have been created with voice recognition software  Occasional wrong word or "sound a like" substitutions may have occurred due to the inherent limitations of voice recognition software    Read the chart carefully and recognize, using context, where substitutions have occurred

## 2022-10-27 NOTE — ASSESSMENT & PLAN NOTE
On presentation, patient was found to be meeting SIRS (tachycardia and tachypnea)  · Sepsis w/u was initiated     · UA showed innumerable WBC--urine cx + however difficult to ascertain if symptoms  · Blood cultures negative  · Was initially on broad-spectrum antibiotics, transitioned to IV ceftriaxone on 10/28 with plan to complete 5 days total per ICU transfer note  · Check and procalcitonin to help guide therapy

## 2022-10-27 NOTE — PLAN OF CARE
Problem: MOBILITY - ADULT  Goal: Maintain or return to baseline ADL function  Description: INTERVENTIONS:  -  Assess patient's ability to carry out ADLs; assess patient's baseline for ADL function and identify physical deficits which impact ability to perform ADLs (bathing, care of mouth/teeth, toileting, grooming, dressing, etc )  - Assess/evaluate cause of self-care deficits   - Assess range of motion  - Assess patient's mobility; develop plan if impaired  - Assess patient's need for assistive devices and provide as appropriate  - Encourage maximum independence but intervene and supervise when necessary  - Involve family in performance of ADLs  - Assess for home care needs following discharge   - Consider OT consult to assist with ADL evaluation and planning for discharge  - Provide patient education as appropriate  Outcome: Progressing  Goal: Maintains/Returns to pre admission functional level  Description: INTERVENTIONS:  - Perform BMAT or MOVE assessment daily    - Set and communicate daily mobility goal to care team and patient/family/caregiver  - Collaborate with rehabilitation services on mobility goals if consulted  - Perform Range of Motion 3 times a day  - Reposition patient every 2 hours    - Out of bed for toileting  - Record patient progress and toleration of activity level   Outcome: Progressing     Problem: PAIN - ADULT  Goal: Verbalizes/displays adequate comfort level or baseline comfort level  Description: Interventions:  - Encourage patient to monitor pain and request assistance  - Assess pain using appropriate pain scale  - Administer analgesics based on type and severity of pain and evaluate response  - Implement non-pharmacological measures as appropriate and evaluate response  - Consider cultural and social influences on pain and pain management  - Notify physician/advanced practitioner if interventions unsuccessful or patient reports new pain  Outcome: Progressing     Problem: INFECTION - ADULT  Goal: Absence or prevention of progression during hospitalization  Description: INTERVENTIONS:  - Assess and monitor for signs and symptoms of infection  - Monitor lab/diagnostic results  - Monitor all insertion sites, i e  indwelling lines, tubes, and drains  - Monitor endotracheal if appropriate and nasal secretions for changes in amount and color  - Harper appropriate cooling/warming therapies per order  - Administer medications as ordered  - Instruct and encourage patient and family to use good hand hygiene technique  - Identify and instruct in appropriate isolation precautions for identified infection/condition  Outcome: Progressing  Goal: Absence of fever/infection during neutropenic period  Description: INTERVENTIONS:  - Monitor WBC    Outcome: Progressing     Problem: SAFETY ADULT  Goal: Maintain or return to baseline ADL function  Description: INTERVENTIONS:  -  Assess patient's ability to carry out ADLs; assess patient's baseline for ADL function and identify physical deficits which impact ability to perform ADLs (bathing, care of mouth/teeth, toileting, grooming, dressing, etc )  - Assess/evaluate cause of self-care deficits   - Assess range of motion  - Assess patient's mobility; develop plan if impaired  - Assess patient's need for assistive devices and provide as appropriate  - Encourage maximum independence but intervene and supervise when necessary  - Involve family in performance of ADLs  - Assess for home care needs following discharge   - Consider OT consult to assist with ADL evaluation and planning for discharge  - Provide patient education as appropriate  Outcome: Progressing  Goal: Maintains/Returns to pre admission functional level  Description: INTERVENTIONS:  - Perform BMAT or MOVE assessment daily    - Set and communicate daily mobility goal to care team and patient/family/caregiver     - Collaborate with rehabilitation services on mobility goals if consulted  - Perform Range of Motion 3 times a day  - Reposition patient every 2 hours  - Out of bed for toileting  - Record patient progress and toleration of activity level   Outcome: Progressing  Goal: Patient will remain free of falls  Description: INTERVENTIONS:  - Educate patient/family on patient safety including physical limitations  - Instruct patient to call for assistance with activity   - Consult OT/PT to assist with strengthening/mobility   - Keep Call bell within reach  - Keep bed low and locked with side rails adjusted as appropriate  - Keep care items and personal belongings within reach  - Initiate and maintain comfort rounds  - Make Fall Risk Sign visible to staff  - Offer Toileting every 2 Hours, in advance of need  - Initiate/Maintain bed alarm  - Obtain necessary fall risk management equipment:   - Apply yellow socks and bracelet for high fall risk patients  - Consider moving patient to room near nurses station  Outcome: Progressing     Problem: DISCHARGE PLANNING  Goal: Discharge to home or other facility with appropriate resources  Description: INTERVENTIONS:  - Identify barriers to discharge w/patient and caregiver  - Arrange for needed discharge resources and transportation as appropriate  - Identify discharge learning needs (meds, wound care, etc )  - Arrange for interpretive services to assist at discharge as needed  - Refer to Case Management Department for coordinating discharge planning if the patient needs post-hospital services based on physician/advanced practitioner order or complex needs related to functional status, cognitive ability, or social support system  Outcome: Progressing     Problem: Knowledge Deficit  Goal: Patient/family/caregiver demonstrates understanding of disease process, treatment plan, medications, and discharge instructions  Description: Complete learning assessment and assess knowledge base    Interventions:  - Provide teaching at level of understanding  - Provide teaching via preferred learning methods  Outcome: Progressing     Problem: Potential for Falls  Goal: Patient will remain free of falls  Description: INTERVENTIONS:  - Educate patient/family on patient safety including physical limitations  - Instruct patient to call for assistance with activity   - Consult OT/PT to assist with strengthening/mobility   - Keep Call bell within reach  - Keep bed low and locked with side rails adjusted as appropriate  - Keep care items and personal belongings within reach  - Initiate and maintain comfort rounds  - Make Fall Risk Sign visible to staff  - Offer Toileting every 2 Hours, in advance of need  - Initiate/Maintain bed alarm  - Obtain necessary fall risk management equipment:   - Apply yellow socks and bracelet for high fall risk patients  - Consider moving patient to room near nurses station  Outcome: Progressing

## 2022-10-28 LAB
ABO GROUP BLD: NORMAL
ANION GAP SERPL CALCULATED.3IONS-SCNC: 4 MMOL/L (ref 4–13)
ANION GAP SERPL CALCULATED.3IONS-SCNC: 4 MMOL/L (ref 4–13)
ATRIAL RATE: 67 BPM
BACTERIA UR CULT: ABNORMAL
BASOPHILS # BLD AUTO: 0 THOUSANDS/ÂΜL (ref 0–0.1)
BASOPHILS NFR BLD AUTO: 0 % (ref 0–1)
BLD GP AB SCN SERPL QL: NEGATIVE
BUN SERPL-MCNC: 55 MG/DL (ref 5–25)
BUN SERPL-MCNC: 56 MG/DL (ref 5–25)
CALCIUM SERPL-MCNC: 7.6 MG/DL (ref 8.3–10.1)
CALCIUM SERPL-MCNC: 8.2 MG/DL (ref 8.3–10.1)
CHLORIDE SERPL-SCNC: 103 MMOL/L (ref 96–108)
CHLORIDE SERPL-SCNC: 105 MMOL/L (ref 96–108)
CO2 SERPL-SCNC: 29 MMOL/L (ref 21–32)
CO2 SERPL-SCNC: 30 MMOL/L (ref 21–32)
CREAT SERPL-MCNC: 2.53 MG/DL (ref 0.6–1.3)
CREAT SERPL-MCNC: 2.61 MG/DL (ref 0.6–1.3)
EOSINOPHIL # BLD AUTO: 0.05 THOUSAND/ÂΜL (ref 0–0.61)
EOSINOPHIL NFR BLD AUTO: 1 % (ref 0–6)
ERYTHROCYTE [DISTWIDTH] IN BLOOD BY AUTOMATED COUNT: 15.3 % (ref 11.6–15.1)
ERYTHROCYTE [DISTWIDTH] IN BLOOD BY AUTOMATED COUNT: 15.5 % (ref 11.6–15.1)
GFR SERPL CREATININE-BSD FRML MDRD: 22 ML/MIN/1.73SQ M
GFR SERPL CREATININE-BSD FRML MDRD: 23 ML/MIN/1.73SQ M
GLUCOSE SERPL-MCNC: 136 MG/DL (ref 65–140)
GLUCOSE SERPL-MCNC: 153 MG/DL (ref 65–140)
GLUCOSE SERPL-MCNC: 155 MG/DL (ref 65–140)
GLUCOSE SERPL-MCNC: 176 MG/DL (ref 65–140)
GLUCOSE SERPL-MCNC: 244 MG/DL (ref 65–140)
GLUCOSE SERPL-MCNC: 266 MG/DL (ref 65–140)
GLUCOSE SERPL-MCNC: 277 MG/DL (ref 65–140)
HCT VFR BLD AUTO: 20.5 % (ref 36.5–49.3)
HCT VFR BLD AUTO: 22.3 % (ref 36.5–49.3)
HCT VFR BLD AUTO: 23.7 % (ref 36.5–49.3)
HCT VFR BLD AUTO: 26 % (ref 36.5–49.3)
HGB BLD-MCNC: 6.3 G/DL (ref 12–17)
HGB BLD-MCNC: 6.9 G/DL (ref 12–17)
HGB BLD-MCNC: 7.7 G/DL (ref 12–17)
HGB BLD-MCNC: 8.1 G/DL (ref 12–17)
IMM GRANULOCYTES # BLD AUTO: 0.02 THOUSAND/UL (ref 0–0.2)
IMM GRANULOCYTES NFR BLD AUTO: 0 % (ref 0–2)
LYMPHOCYTES # BLD AUTO: 0.66 THOUSANDS/ÂΜL (ref 0.6–4.47)
LYMPHOCYTES NFR BLD AUTO: 10 % (ref 14–44)
MAGNESIUM SERPL-MCNC: 2.2 MG/DL (ref 1.6–2.6)
MAGNESIUM SERPL-MCNC: 2.4 MG/DL (ref 1.6–2.6)
MCH RBC QN AUTO: 28 PG (ref 26.8–34.3)
MCH RBC QN AUTO: 28.6 PG (ref 26.8–34.3)
MCHC RBC AUTO-ENTMCNC: 30.7 G/DL (ref 31.4–37.4)
MCHC RBC AUTO-ENTMCNC: 32.5 G/DL (ref 31.4–37.4)
MCV RBC AUTO: 88 FL (ref 82–98)
MCV RBC AUTO: 91 FL (ref 82–98)
MONOCYTES # BLD AUTO: 0.61 THOUSAND/ÂΜL (ref 0.17–1.22)
MONOCYTES NFR BLD AUTO: 10 % (ref 4–12)
NEUTROPHILS # BLD AUTO: 4.98 THOUSANDS/ÂΜL (ref 1.85–7.62)
NEUTS SEG NFR BLD AUTO: 79 % (ref 43–75)
NRBC BLD AUTO-RTO: 0 /100 WBCS
PHOSPHATE SERPL-MCNC: 4.3 MG/DL (ref 2.3–4.1)
PHOSPHATE SERPL-MCNC: 4.4 MG/DL (ref 2.3–4.1)
PLATELET # BLD AUTO: 120 THOUSANDS/UL (ref 149–390)
PLATELET # BLD AUTO: 127 THOUSANDS/UL (ref 149–390)
PMV BLD AUTO: 10.2 FL (ref 8.9–12.7)
PMV BLD AUTO: 10.7 FL (ref 8.9–12.7)
POTASSIUM SERPL-SCNC: 4.6 MMOL/L (ref 3.5–5.3)
POTASSIUM SERPL-SCNC: 4.7 MMOL/L (ref 3.5–5.3)
PR INTERVAL: 0 MS
QRS AXIS: 104 DEGREES
QRSD INTERVAL: 142 MS
QT INTERVAL: 438 MS
QTC INTERVAL: 463 MS
RBC # BLD AUTO: 2.25 MILLION/UL (ref 3.88–5.62)
RBC # BLD AUTO: 2.69 MILLION/UL (ref 3.88–5.62)
RH BLD: POSITIVE
SODIUM SERPL-SCNC: 137 MMOL/L (ref 135–147)
SODIUM SERPL-SCNC: 138 MMOL/L (ref 135–147)
SPECIMEN EXPIRATION DATE: NORMAL
T WAVE AXIS: 55 DEGREES
VANCOMYCIN SERPL-MCNC: 15.4 UG/ML (ref 10–20)
VENTRICULAR RATE: 67 BPM
WBC # BLD AUTO: 5.77 THOUSAND/UL (ref 4.31–10.16)
WBC # BLD AUTO: 6.32 THOUSAND/UL (ref 4.31–10.16)

## 2022-10-28 RX ORDER — INSULIN LISPRO 100 [IU]/ML
8 INJECTION, SOLUTION INTRAVENOUS; SUBCUTANEOUS
Status: DISCONTINUED | OUTPATIENT
Start: 2022-10-28 | End: 2022-11-02 | Stop reason: HOSPADM

## 2022-10-28 RX ORDER — PANTOPRAZOLE SODIUM 40 MG/10ML
40 INJECTION, POWDER, LYOPHILIZED, FOR SOLUTION INTRAVENOUS EVERY 12 HOURS SCHEDULED
Status: DISCONTINUED | OUTPATIENT
Start: 2022-10-28 | End: 2022-10-28

## 2022-10-28 RX ORDER — FUROSEMIDE 10 MG/ML
40 INJECTION INTRAMUSCULAR; INTRAVENOUS ONCE
Status: COMPLETED | OUTPATIENT
Start: 2022-10-28 | End: 2022-10-28

## 2022-10-28 RX ORDER — PANTOPRAZOLE SODIUM 40 MG/10ML
40 INJECTION, POWDER, LYOPHILIZED, FOR SOLUTION INTRAVENOUS EVERY 12 HOURS SCHEDULED
Status: DISCONTINUED | OUTPATIENT
Start: 2022-10-28 | End: 2022-10-29

## 2022-10-28 RX ORDER — ACETAMINOPHEN 325 MG/1
650 TABLET ORAL ONCE
Status: COMPLETED | OUTPATIENT
Start: 2022-10-28 | End: 2022-10-28

## 2022-10-28 RX ORDER — INSULIN LISPRO 100 [IU]/ML
7 INJECTION, SOLUTION INTRAVENOUS; SUBCUTANEOUS
Status: DISCONTINUED | OUTPATIENT
Start: 2022-10-28 | End: 2022-10-28

## 2022-10-28 RX ORDER — ASPIRIN 81 MG/1
81 TABLET ORAL DAILY
Status: DISCONTINUED | OUTPATIENT
Start: 2022-10-29 | End: 2022-11-02 | Stop reason: HOSPADM

## 2022-10-28 RX ADMIN — LEVALBUTEROL HYDROCHLORIDE 1.25 MG: 1.25 SOLUTION, CONCENTRATE RESPIRATORY (INHALATION) at 18:58

## 2022-10-28 RX ADMIN — LEVALBUTEROL HYDROCHLORIDE 1.25 MG: 1.25 SOLUTION, CONCENTRATE RESPIRATORY (INHALATION) at 08:29

## 2022-10-28 RX ADMIN — INSULIN LISPRO 7 UNITS: 100 INJECTION, SOLUTION INTRAVENOUS; SUBCUTANEOUS at 07:49

## 2022-10-28 RX ADMIN — ACETAMINOPHEN 650 MG: 325 TABLET ORAL at 11:20

## 2022-10-28 RX ADMIN — PANTOPRAZOLE SODIUM 40 MG: 40 TABLET, DELAYED RELEASE ORAL at 06:04

## 2022-10-28 RX ADMIN — CEFEPIME 2000 MG: 2 INJECTION, POWDER, FOR SOLUTION INTRAVENOUS at 04:32

## 2022-10-28 RX ADMIN — INSULIN LISPRO 2 UNITS: 100 INJECTION, SOLUTION INTRAVENOUS; SUBCUTANEOUS at 12:09

## 2022-10-28 RX ADMIN — BUDESONIDE 0.5 MG: 0.5 INHALANT ORAL at 08:29

## 2022-10-28 RX ADMIN — INSULIN GLARGINE 20 UNITS: 100 INJECTION, SOLUTION SUBCUTANEOUS at 21:24

## 2022-10-28 RX ADMIN — IPRATROPIUM BROMIDE 0.5 MG: 0.5 SOLUTION RESPIRATORY (INHALATION) at 08:29

## 2022-10-28 RX ADMIN — INSULIN LISPRO 4 UNITS: 100 INJECTION, SOLUTION INTRAVENOUS; SUBCUTANEOUS at 07:50

## 2022-10-28 RX ADMIN — CEFTRIAXONE SODIUM 1000 MG: 10 INJECTION, POWDER, FOR SOLUTION INTRAVENOUS at 15:52

## 2022-10-28 RX ADMIN — BUDESONIDE 0.5 MG: 0.5 INHALANT ORAL at 18:57

## 2022-10-28 RX ADMIN — PANTOPRAZOLE SODIUM 40 MG: 40 INJECTION, POWDER, FOR SOLUTION INTRAVENOUS at 17:19

## 2022-10-28 RX ADMIN — CARVEDILOL 6.25 MG: 6.25 TABLET, FILM COATED ORAL at 17:19

## 2022-10-28 RX ADMIN — FUROSEMIDE 40 MG: 10 INJECTION, SOLUTION INTRAMUSCULAR; INTRAVENOUS at 10:39

## 2022-10-28 RX ADMIN — INSULIN LISPRO 2 UNITS: 100 INJECTION, SOLUTION INTRAVENOUS; SUBCUTANEOUS at 17:21

## 2022-10-28 RX ADMIN — IPRATROPIUM BROMIDE 0.5 MG: 0.5 SOLUTION RESPIRATORY (INHALATION) at 18:58

## 2022-10-28 RX ADMIN — FORMOTEROL FUMARATE DIHYDRATE 20 MCG: 20 SOLUTION RESPIRATORY (INHALATION) at 08:29

## 2022-10-28 RX ADMIN — INSULIN LISPRO 8 UNITS: 100 INJECTION, SOLUTION INTRAVENOUS; SUBCUTANEOUS at 17:21

## 2022-10-28 RX ADMIN — INSULIN LISPRO 7 UNITS: 100 INJECTION, SOLUTION INTRAVENOUS; SUBCUTANEOUS at 12:09

## 2022-10-28 NOTE — PROGRESS NOTES
Spiritual Care Progress Note    10/28/2022  Patient: Cherry Mark : 1945  Admission Date & Time: 10/26/2022 1203  MRN: [id number] [facility]: [id number]      [facility] visited pt while doing rounds on the unit  Pt was receiving medication at the time of the encounter and was willing to have  speak with him briefly  Pt is not a "Latter day" person but appreciated the visit  Pt seemed discouraged by his health and what he is now dealing with in his health   offered words of comfort, provided some education on Chaplains, and offered prayer  Pt declined prayer with the  but said that "prayer helps"   prayed after the encounter   remains available                 Chaplaincy Interventions Utilized:   Empowerment: Clarified, confirmed, or reviewed information from treatment team  and Provided chaplaincy education    Exploration: Explored emotional needs & resources, Explored relational needs & resources, and Explored spiritual needs & resources    Collaboration: Consulted with interdisciplinary team    Relationship Building: Cultivated a relationship of care and support and Listened empathically    Ritual: Provided prayer      Chaplaincy Outcomes Achieved:  Relational resources utilized and Unknown outcome    Spiritual Coping Strategies Utilized:   No spiritual coping         10/28/22 1300   Clinical Encounter Type   Visited With Patient   Routine Visit Introduction   Sabianist Encounters   Sabianist Needs Prayer   Patient Spiritual Encounters   Spiritual Assessment 3   Suffering Severity 3

## 2022-10-28 NOTE — PROGRESS NOTES
Progress Note - Critical Care   Neetu Pugh   78 y o  male MRN: [id number]  Unit/Bed#: MICU 03 Encounter: [id number]  HPI:  " Ryan Grayson  is a 78 y o  male with PMH significant for COPD (FEV1/FVC 41%; FEV1 33%; 2L O2 at home), stage 1A2 lung adenocarcinoma s/p SBRT, recurrent right pleural effusion, HFpEF (EF 60%), pulmonary hypertension,  CAD (on aspirin and statin), MI s/p PCI, afib (on eliquis) Type II DM on insulin, JACQUELINE (on CPAP), GERD, HLD, HTN, and prostate cancer s/p brachytherapy and EBRTwho presents with respiratory distress  Unable to obtain hx due to patient being intubated  Per ED providers, patient presented with hypertension (systolics in 668T) and respiratory distress saturating in low 90s  Given Nitrogylcerin x2  Initially trialed on 6L O2  He was given Lasix 40 mg x1, Nebulizer treatment, Magsulfate x1, and IV Methylprednisolone 125 mg  He continued to have worsening of his respiratory status  Initially offered to be put on BiPAP but refused and asked "for the tube"  He was subsequently sedated and intubated  After induction of sedation, patients BP dropped to 63R systolics and subsequently stabilized without use of any pressors "    24HR Events:  -yesterday, hemoglobin dropped to 6 3  Given 1 unit packed red blood cells with improvement to 8 1  GI was consulted over concern for GI bleed  No plans for endoscopy unless further bleeding   - given IV Lasix 40 mg x 1  - patient became bradycardic with heart rates in the 40s  He was seen at bedside and was asymptomatic  Electrolytes were within normal limits and hemoglobin was stable at 7 7    SUBJECTIVE:  This morning, patient is reporting baseline shortness of breath  He is also endorsing some lightheadedness and dizziness  Reporting sore throat  Has not had any bowel movements while here  Denies any fevers, chills, chest pain, abdominal pain, nausea, vomiting, diarrhea, constipation      ROS  All negative unless stated above    Invasive lines and devices: Invasive Devices  Report    Peripheral Intravenous Line  Duration           Peripheral IV 10/26/22 Left Antecubital 2 days    Peripheral IV 10/26/22 Right Wrist 2 days                   Physical exam  General: Resting comfortably in bed in no acute distress  Neuro:  AAO x3  HENT: Normocephalic and atraumatic, PERRL  Heart: RRR, pulses intact  Lungs:  Diminished breath sounds throughout; no evidence of rales   Abdomen:  Bowel sounds present, soft  Skin:  Warm, dry skin/Incision site clean dry and intact  Lower extremity:  Trace pitting edema in bilateral lower extremities    Vitals  Temperature:   Temp (24hrs), Av 2 °F (36 8 °C), Min:97 8 °F (36 6 °C), Max:98 5 °F (36 9 °C)    Current: Temperature: 98 4 °F (36 9 °C)    Vitals:    10/28/22 0825 10/28/22 0832 10/28/22 1136 10/28/22 1418   BP: (!) 107/48  125/58 130/62   BP Location:       Pulse: 82  66 67   Resp:  18   Temp:   98 2 °F (36 8 °C) 98 4 °F (36 9 °C)   TempSrc:   Oral    SpO2: 99% 100% 98%    Weight:                 Labs:   Results from last 7 days   Lab Units 10/28/22  0742 10/28/22  0432 10/27/22  0431 10/26/22  1230   WBC Thousand/uL  --  6 32 6 76 8 47   HEMOGLOBIN g/dL 6 9* 6 3* 7 0* 7 9*   HEMATOCRIT % 22 3* 20 5* 22 5* 25 6*   PLATELETS Thousands/uL  --  120* 124* 127*   NEUTROS PCT %  --  79* 91* 86*   MONOS PCT %  --  10 5 6      Results from last 7 days   Lab Units 10/28/22  0432 10/27/22  0431 10/26/22  1230 10/26/22  1213   SODIUM mmol/L 138 140 136  --    POTASSIUM mmol/L 4 7 5 1 5 5*  --    CHLORIDE mmol/L 105 106 103  --    CO2 mmol/L 29 34* 27  --    CO2, I-STAT mmol/L  --   --   --  32   BUN mg/dL 56* 51* 49*  --    CREATININE mg/dL 2 53* 2 33* 2 52*  --    CALCIUM mg/dL 7 6* 8 8 8 1*  --    ALK PHOS U/L  --  77 100  --    ALT U/L  --  23 30  --    AST U/L  --  10 20  --    GLUCOSE, ISTAT mg/dl  --   --   --  508*     Results from last 7 days   Lab Units 10/28/22  0432 10/27/22  1709 MAGNESIUM mg/dL 2 2 2 7*     Results from last 7 days   Lab Units 10/28/22  0432 10/27/22  0431   PHOSPHORUS mg/dL 4 3* 5 2*          Results from last 7 days   Lab Units 10/26/22  1717   LACTIC ACID mmol/L 1 3       Other Labs    Intake and Outputs:  I/O       10/26 0701  10/27 0700 10/27 0701  10/28 0700 10/28 0701  10/29 0700    P  O   1990     I V  (mL/kg) 369 7 (3 7) 313 (3 1)     Blood   358 2    IV Piggyback 400 50     Total Intake(mL/kg) 769 7 (7 6) 2353 (23 7) 358 2 (3 6)    Urine (mL/kg/hr) 1575 2175 (0 9) 700 (0 9)    Emesis/NG output 500      Total Output 2075 2175 700    Net -1305 3 +178 -341 8                 Assessment & Plan:   Assessment:    1)  Acute hypoxic hypercapnic respiratory failure (likely 2/2 COPD exacerbation vs CHF exacerbation)  2)  SIRS (tachycardia and tachypnea) likely 2/2 UTI  3)  Bradycardia in setting of first-degree AV block  4)  Acute on chronic anemia  5)  Peptic Ulcer Disease  5)  Insulin dependent Type II DM  6)  MARANDA on CKD  7)  Stage 1A2 Lung adenocarcinoma  8)  COPD  9)  HFpEF  10)  Recurrent Left Pleural Effusions   11)  CAD s/p PCI  12)  Hypertension        Neuro:   -No active issues   CV:     -Diagnosis:  Bradycardia secondary to 1st degree block   Patient is reporting lightheadedness and dizziness   Consider EP consult     - Diagnosis: HpEF              Most recent Echo in 05/2022 showed EF 60% with some RV dilation              Patient presenting with acute hypoxic hypercapnic respiratory failure  Net -1 L yesterday and still has evidence of pulm edema              Transition to p o   Lasix 40 mg                 - Diagnosis: CAD c/p MI s/p PCI              Plan: Restart home aspirin              Hold statin     - Diagnosis: Hypertension              Continue Home Carvedilol and Imdur         - Diagnosis: Afib              At home on Eliquis 5 mg BID              Consider restarting once Hgb stable     Pulm:  - Diagnosis: Acute hypoxic hypercapnic respiratory failure              Could be in setting of CHF exacerbation vs COPD exacerbation vs Worsening of right pleural effusion              Resolved, patient is back on 2L O2   - Diagnosis: COPD              Plan: On home is on Incruse Ellipta and Breo Ellipta              Continue with albuterol, Atrovent, sodium chloride nebs              Continue with Pulmicort and Perforomist              Continue with Atrovent and Xopenex  - Diagnosis: Lung Adenocarcinoma              Hx of Stage 1A2 lung adenocarcinoma s/p SBRT; Follows with Pulm outpatient              Diagnosis: Recurrent Right Pleural Effusion              Can consider thoracentesis if poor response with diuresis     GI:   - Diagnosis: Peptic Ulcer Disease               Found to have ulcer on EGD on previous admission   GI consulted yesterday over drop in hemoglobin and reported melena at home   No plan for scope if hemoglobin stable              Will continue with PO Protonix 40 mg BID  -Bowel regimen: Senna and Miralax PRN        :   - Diagnosis: MARANDA on CKD              Plan: Creatinine elevated to 2 5 from 1 6-1 8 likely in s/o cardiorenal syndrome               Creatinine decreased to 2 3 today              Will plan for transition to p o  Lasix               Trend creatinine  - Maintain Cook for strict Is and Os     F/E/N:   Plan: Carb controlled and fluid restricted diet        Heme/Onc:   -Diagnosis: Normocytic Anemia              Hgb decreased to 6 3 yesterday   Received 1 unit packed red blood cells   No plans for endoscopy              Had positive FOBT two weeks prior              On Ferrous Sulfate 325 mg at home              Continue to trend CBC  - Diagnosis: Prostate Cancer               S/p brachytherapy and EBRT              On Tamsulosin 0 4 mg at home        Endo:   - Diagnosis: HHS              Hx of Insulin Dependent Type II DM   On recent admission was found to have uncontrolled BG and was put on Lantus 40U and Humalog 15U TID              Glucose elevated to 530 on admission              Initially on insulin drip and was transitioned to SSI            On Lantus 20 units and Humalog 8 units t i d                    ID:   - Diagnosis: UTI              Patient with urine cx positive for > 100,000 Klebsiella               Started on ceftriaxone (on day 4) for total 5 day course              MSK/Skin:   - Turning, Wound care and SCDs  Patient appropriate for transfer out of the ICU today?: No  Disposition: Continue Critical Care   Code Status: Level 1 - Full Code    Non-Invasive/Invasive Ventilation Settings:  Respiratory  Report   Lab Data (Last 4 hours)    None         O2/Vent Data (Last 4 hours)    None              No results found for: PHART, IRB8OWU, PO2ART, UPG7IEP, C7AIXLYM, BEART, SOURCE  SpO2: SpO2: 100 %    Imaging:   XR chest portable ICU   Final Result by Steph Humphrey MD (10/26 1631)      Small-to-moderate right pleural effusion with underlying atelectasis  Decreased right lung volume  Workstation performed: [id number]         XR chest portable   Final Result by Johnnie Pierce MD (10/26 1419)         1  Chronic opacification with volume loss in the right hemithorax is similar to prior studies  2   Interval intubation  Workstation performed: [id number]         XR chest portable   Final Result by Heavenly Calabrese MD (10/26 1257)      Chronic right-sided changes including midline shift, increased right effusion and chronic right base atelectasis                  Workstation performed: [id number]           I have personally reviewed pertinent reports  Micro:  Lab Results   Component Value Date    BLOODCX No Growth at 24 hrs  10/26/2022    BLOODCX No Growth at 24 hrs  10/26/2022    BLOODCX No Growth After 5 Days  08/15/2022    BLOODCX No Growth After 5 Days   08/15/2022    URINECX >100,000 cfu/ml Klebsiella variicola (A) 10/26/2022    URINECX >100,000 cfu/ml Pseudomonas aeruginosa (A) 06/15/2022    URINECX >100,000 cfu/ml Morganella morganii (A) 03/29/2022    SPUTUMCULTUR Few Colonies of Pseudomonas aeruginosa (A) 10/26/2022    SPUTUMCULTUR 1+ Growth of  10/26/2022    SPUTUMCULTUR 3+ Growth of  06/11/2022         Allergies: Allergies   Allergen Reactions   • Crestor [Rosuvastatin] Other (See Comments)     Unknown     • Lisinopril GI Intolerance, Dizziness and Abdominal Pain   • Metformin GI Intolerance         Medications:   Scheduled Meds:  Current Facility-Administered Medications   Medication Dose Route Frequency Provider Last Rate   • acetaminophen  975 mg Oral Q6H PRN Radha Love MD     • budesonide  0 5 mg Nebulization Q12H Linus Party, DO     • carvedilol  6 25 mg Oral BID With Meals Merced Catching, CRNP     • cefTRIAXone  1,000 mg Intravenous Q24H Carter London MD     • chlorhexidine  15 mL Mouth/Throat R04G [facility] Meaghan Saucedoing, CRNP     • fentanyl citrate (PF)  100 mcg Intravenous Q2H PRN Radha Love MD     • formoterol  20 mcg Nebulization Q12H Linus Party, DO     • insulin glargine  20 Units Subcutaneous HS Meaghan Catching, CRNP     • insulin lispro  2-12 Units Subcutaneous TID AC Merced Catching, CRNP     • insulin lispro  8 Units Subcutaneous TID With Meals Carter London MD     • ipratropium  0 5 mg Nebulization TID Linus Party, DO     • isosorbide mononitrate  30 mg Oral Daily Meaghan Lewising, CRNP     • levalbuterol  1 25 mg Nebulization TID Myranda Maxwell, DO     • Lidocaine Viscous HCl  15 mL Swish & Swallow 4x Daily PRN Radha Love MD     • pantoprazole  40 mg Intravenous Q12H [facility] Phoebe Sauceda MD       Continuous Infusions:   PRN Meds:  acetaminophen, 975 mg, Q6H PRN  fentanyl citrate (PF), 100 mcg, Q2H PRN  Lidocaine Viscous HCl, 15 mL, 4x Daily PRN         Code Status: Level 1 - Full Code     Portions of the record may have been created with voice recognition software    Occasional wrong word or "sound a like" substitutions may have occurred due to the inherent limitations of voice recognition software  Read the chart carefully and recognize, using context, where substitutions have occurred    Nell Lu MD  Transitional Year Resident, PGY1  2:37 PM

## 2022-10-28 NOTE — CONSULTS
Consultation - [facility]) [facility]  Raheem Forman  78 y o  male MRN: [id number]  Unit/Bed#: MICU 03 Encounter: [id number]              Inpatient consult to gastroenterology     Performed by  Дмитрий Beltran MD     Authorized by Zoya Meade MD              Reason for Consult / Principal Problem:     Melena  Acute on chronic anemia      ASSESSMENT AND PLAN:    27-year-old man with multiple comorbidities including severe COPD, AFib on Eliquis, lung cancer status post SBRT, chronic diastolic CHF, diabetes mellitus, GERD who was admitted to the hospital for respiratory distress requiring intubation  He was recently discharged from from the hospital during which time he he was treated for a GI bleed secondary to duodenal ulcer  Acute on chronic anemia  Duodenal ulcer  AFib on Eliquis    Patient was recently admitted in the hospital and underwent EGD on 10/20 which showed Pierce IIA ulcer in the duodenal sweep this was treated with APC and epinephrine  This was a very difficult procedure given difficult location of the ulcer therefore hemo spray was used  · He had a hemoglobin drop from 7-6 3 this morning, has had no episodes of melena charted since this hospital admission  · Plan was to perform EGD today however patient had already had his breakfast  · He remains hemodynamically stable  · Continue IV PPI  · If patient continues to drop his hemoglobin or has episodes of melena will consider doing an EGD over the weekend    However, if he  · No contraindication from GI standpoint to be on aspirin  · Eliquis is currently being held, discussed with ICU team if anticoagulation is needed can do heparin drip  · Consider checking H pylori stool antigen    Patient discussed with Dr Mark Flores MD   Gastroenterology Fellow       ______________________________________________________________________    HPI:  27-year-old man with multiple comorbidities including severe COPD, AFib on Eliquis, lung cancer status post SBRT, chronic diastolic CHF, diabetes mellitus, GERD who was admitted to the hospital for respiratory distress requiring intubation  He was recently discharged from from the hospital during which time he he was treated for a GI bleed secondary to duodenal ulcer  Patient states that he has been having melena since his discharge from the hospital   He does not remember the exact details and states that since he had a colonoscopy  He was taking his PPI regularly at home  This hospital stay he was admitted with respiratory distress requiring intubation but was extubated yesterday  Today his hemoglobin dropped from 7-6 3 without any overt signs of GI bleed  But he does state he is was having melena at home, however, no witnessed episodes of melena by the bedside nurse  REVIEW OF SYSTEMS:    CONSTITUTIONAL: Denies any fever, chills, rigors, and weight loss  HEENT: No earache or tinnitus  Denies hearing loss or visual disturbances  CARDIOVASCULAR: No chest pain or palpitations  RESPIRATORY: + dyspnea  GASTROINTESTINAL: As noted in the History of Present Illness  GENITOURINARY: No problems with urination  Denies any hematuria or dysuria  NEUROLOGIC: No dizziness or vertigo, denies headaches  MUSCULOSKELETAL: Denies any muscle or joint pain  SKIN: Denies skin rashes or itching  ENDOCRINE: Denies excessive thirst  Denies intolerance to heat or cold  PSYCHOSOCIAL: Denies depression or anxiety  Denies any recent memory loss         Historical Information   Past Medical History:   Diagnosis Date   • Abdominal pain    • MARANDA (acute kidney injury) ([facility] [address] ) 6/19/2018   • Cardiac disease    • CHF (congestive heart failure) ([facility])    • COPD, severe ([facility])    • Coronary artery disease    • DDD (degenerative disc disease), lumbar 9/1/2020   • Diabetes mellitus (HCC)    • Dyspnea    • GERD (gastroesophageal reflux disease)    • Hyperlipidemia    • Hypertension    • MI (myocardial infarction) [facility])     with 3 stents   • Nodule of apex of right lung    • JACQUELINE (obstructive sleep apnea)    • Prostate cancer [facility])      Past Surgical History:   Procedure Laterality Date   • ABDOMINAL SURGERY      exploratory   • ANGIOPLASTY      3 stents   • APPENDECTOMY     • COLONOSCOPY     • CT NEEDLE BIOPSY LUNG  11/3/2020   • ESOPHAGOGASTRODUODENOSCOPY N/A 10/2/2017    Procedure: ESOPHAGOGASTRODUODENOSCOPY (EGD); Surgeon: Neha Alexander MD;  Location: [facility];   Service: Gastroenterology   • IR THORACENTESIS  11/3/2020   • KNEE CARTILAGE SURGERY     • OTHER SURGICAL HISTORY      stent placement   • PROSTATE SURGERY     • SKIN GRAFT      Basal cell CA back     Social History   Social History     Substance and Sexual Activity   Alcohol Use Never     Social History     Substance and Sexual Activity   Drug Use No     Social History     Tobacco Use   Smoking Status Former Smoker   • Packs/day: 1 50   • Years: 50 00   • Pack years: 75 00   • Start date: 38   • Quit date: 2020   • Years since quittin 2   Smokeless Tobacco Never Used     Family History   Problem Relation Age of Onset   • Heart disease Father    • Other Father         Mesothelioma        Meds/Allergies     Medications Prior to Admission   Medication   • Alcohol Swabs 70 % PADS   • apixaban (ELIQUIS) 5 mg   • aspirin (ECOTRIN LOW STRENGTH) 81 mg EC tablet   • Blood Glucose Monitoring Suppl (OneTouch Verio Reflect) w/Device KIT   • carvedilol (COREG) 6 25 mg tablet   • cyclobenzaprine (FLEXERIL) 10 mg tablet   • ferrous sulfate 325 (65 Fe) mg tablet   • fluticasone-vilanterol (BREO ELLIPTA) 100-25 mcg/inh inhaler   • furosemide (LASIX) 40 mg tablet   • gabapentin (NEURONTIN) 100 mg capsule   • glucose blood (OneTouch Verio) test strip   • glucose blood test strip   • Insulin Glargine Solostar (Lantus SoloStar) 100 UNIT/ML SOPN   • insulin lispro (HumaLOG KwikPen) 100 units/mL injection pen   • Insulin Pen Needle (BD Pen Needle Beena 2nd Gen) 32G X 4 MM MISC   • Insulin Pen Needle (BD Pen Needle Beena 2nd Gen) 32G X 4 MM MISC   • isosorbide mononitrate (IMDUR) 30 mg 24 hr tablet   • lidocaine (Lidoderm) 5 %   • OneTouch Delica Lancets 19O MISC   • pantoprazole (PROTONIX) 40 mg tablet   • simvastatin (ZOCOR) 40 mg tablet   • tamsulosin (FLOMAX) 0 4 mg   • umeclidinium bromide (INCRUSE ELLIPTA) 62 5 mcg/inh AEPB inhaler     Current Facility-Administered Medications   Medication Dose Route Frequency   • acetaminophen (TYLENOL) tablet 650 mg  650 mg Oral Once   • acetaminophen (TYLENOL) tablet 975 mg  975 mg Oral Q6H PRN   • budesonide (PULMICORT) inhalation solution 0 5 mg  0 5 mg Nebulization Q12H   • carvedilol (COREG) tablet 6 25 mg  6 25 mg Oral BID With Meals   • cefTRIAXone (ROCEPHIN) 1,000 mg in dextrose 5 % 50 mL IVPB  1,000 mg Intravenous Q24H   • chlorhexidine (PERIDEX) 0 12 % oral rinse 15 mL  15 mL Mouth/Throat Q12H DRAKE   • fentanyl citrate (PF) 100 MCG/2ML 100 mcg  100 mcg Intravenous Q2H PRN   • formoterol (PERFOROMIST) nebulizer solution 20 mcg  20 mcg Nebulization Q12H   • furosemide (LASIX) injection 40 mg  40 mg Intravenous Once   • insulin glargine (LANTUS) subcutaneous injection 20 Units 0 2 mL  20 Units Subcutaneous HS   • insulin lispro (HumaLOG) 100 units/mL subcutaneous injection 2-12 Units  2-12 Units Subcutaneous TID AC   • insulin lispro (HumaLOG) 100 units/mL subcutaneous injection 7 Units  7 Units Subcutaneous TID With Meals   • ipratropium (ATROVENT) 0 02 % inhalation solution 0 5 mg  0 5 mg Nebulization TID   • isosorbide mononitrate (IMDUR) 24 hr tablet 30 mg  30 mg Oral Daily   • levalbuterol (XOPENEX) inhalation solution 1 25 mg  1 25 mg Nebulization TID   • Lidocaine Viscous HCl (XYLOCAINE) 2 % mucosal solution 15 mL  15 mL Swish & Swallow 4x Daily PRN   • pantoprazole (PROTONIX) EC tablet 40 mg  40 mg Oral BID AC       Allergies   Allergen Reactions   • Crestor [Rosuvastatin] Other (See Comments)     Unknown     • Lisinopril GI Intolerance, Dizziness and Abdominal Pain   • Metformin GI Intolerance           Objective     Blood pressure (!) 107/48, pulse 82, temperature 98 5 °F (36 9 °C), temperature source Oral, resp  rate 19, weight 99 4 kg (219 lb 2 2 oz), SpO2 100 %  Body mass index is 29 72 kg/m²  Intake/Output Summary (Last 24 hours) at 10/28/2022 6082  Last data filed at 10/28/2022 0557  Gross per 24 hour   Intake 2321 92 ml   Output 2175 ml   Net 146 92 ml         PHYSICAL EXAM:      General Appearance:   Alert, cooperative, no distress   HEENT:   Normocephalic, atraumatic, anicteric  Neck:  Supple, symmetrical, trachea midline   Lungs:   Clear to auscultation bilaterally; no rales, rhonchi or wheezing; respirations unlabored    Heart[de-identified]   Regular rate and rhythm; no murmur, rub, or gallop  Abdomen:   Soft, non-tender, non-distended; normal bowel sounds; no masses, no organomegaly    Genitalia:   Deferred    Rectal:   Deferred    Extremities:  No cyanosis, clubbing or edema    Pulses:  2+ and symmetric all extremities    Skin:  No jaundice, rashes, or lesions    Lymph nodes:  No palpable cervical lymphadenopathy        Lab Results:   No results displayed because visit has over 200 results  Imaging Studies: I have personally reviewed pertinent imaging studies

## 2022-10-28 NOTE — PLAN OF CARE
Problem: MOBILITY - ADULT  Goal: Maintain or return to baseline ADL function  Description: INTERVENTIONS:  -  Assess patient's ability to carry out ADLs; assess patient's baseline for ADL function and identify physical deficits which impact ability to perform ADLs (bathing, care of mouth/teeth, toileting, grooming, dressing, etc )  - Assess/evaluate cause of self-care deficits   - Assess range of motion  - Assess patient's mobility; develop plan if impaired  - Assess patient's need for assistive devices and provide as appropriate  - Encourage maximum independence but intervene and supervise when necessary  - Involve family in performance of ADLs  - Assess for home care needs following discharge   - Consider OT consult to assist with ADL evaluation and planning for discharge  - Provide patient education as appropriate  Outcome: Progressing  Goal: Maintains/Returns to pre admission functional level  Description: INTERVENTIONS:  - Perform BMAT or MOVE assessment daily    - Set and communicate daily mobility goal to care team and patient/family/caregiver  - Collaborate with rehabilitation services on mobility goals if consulted  - Perform Range of Motion 3 times a day  - Reposition patient every 2 hours      - Out of bed for toileting  - Record patient progress and toleration of activity level   Outcome: Progressing     Problem: PAIN - ADULT  Goal: Verbalizes/displays adequate comfort level or baseline comfort level  Description: Interventions:  - Encourage patient to monitor pain and request assistance  - Assess pain using appropriate pain scale  - Administer analgesics based on type and severity of pain and evaluate response  - Implement non-pharmacological measures as appropriate and evaluate response  - Consider cultural and social influences on pain and pain management  - Notify physician/advanced practitioner if interventions unsuccessful or patient reports new pain  Outcome: Progressing     Problem: INFECTION - ADULT  Goal: Absence or prevention of progression during hospitalization  Description: INTERVENTIONS:  - Assess and monitor for signs and symptoms of infection  - Monitor lab/diagnostic results  - Monitor all insertion sites, i e  indwelling lines, tubes, and drains  - Monitor endotracheal if appropriate and nasal secretions for changes in amount and color  - [place] appropriate cooling/warming therapies per order  - Administer medications as ordered  - Instruct and encourage patient and family to use good hand hygiene technique  - Identify and instruct in appropriate isolation precautions for identified infection/condition  Outcome: Progressing  Goal: Absence of fever/infection during neutropenic period  Description: INTERVENTIONS:  - Monitor WBC    Outcome: Progressing     Problem: SAFETY ADULT  Goal: Maintain or return to baseline ADL function  Description: INTERVENTIONS:  -  Assess patient's ability to carry out ADLs; assess patient's baseline for ADL function and identify physical deficits which impact ability to perform ADLs (bathing, care of mouth/teeth, toileting, grooming, dressing, etc )  - Assess/evaluate cause of self-care deficits   - Assess range of motion  - Assess patient's mobility; develop plan if impaired  - Assess patient's need for assistive devices and provide as appropriate  - Encourage maximum independence but intervene and supervise when necessary  - Involve family in performance of ADLs  - Assess for home care needs following discharge   - Consider OT consult to assist with ADL evaluation and planning for discharge  - Provide patient education as appropriate  Outcome: Progressing  Goal: Maintains/Returns to pre admission functional level  Description: INTERVENTIONS:  - Perform BMAT or MOVE assessment daily    - Set and communicate daily mobility goal to care team and patient/family/caregiver     - Collaborate with rehabilitation services on mobility goals if consulted  - Perform Range of Motion 2 times a day  - Reposition patient every 2 hours  - Out of bed for toileting  - Record patient progress and toleration of activity level   Outcome: Progressing  Goal: Patient will remain free of falls  Description: INTERVENTIONS:  - Educate patient/family on patient safety including physical limitations  - Instruct patient to call for assistance with activity   - Consult OT/PT to assist with strengthening/mobility   - Keep Call bell within reach  - Keep bed low and locked with side rails adjusted as appropriate  - Keep care items and personal belongings within reach  - Initiate and maintain comfort rounds  - Make Fall Risk Sign visible to staff  - Offer Toileting every 2 Hours, in advance of need  - Initiate/Maintain bed alarm  - Obtain necessary fall risk management equipment:   - Apply yellow socks and bracelet for high fall risk patients  - Consider moving patient to room near nurses station  Outcome: Progressing     Problem: DISCHARGE PLANNING  Goal: Discharge to home or other facility with appropriate resources  Description: INTERVENTIONS:  - Identify barriers to discharge w/patient and caregiver  - Arrange for needed discharge resources and transportation as appropriate  - Identify discharge learning needs (meds, wound care, etc )  - Arrange for interpretive services to assist at discharge as needed  - Refer to Case Management Department for coordinating discharge planning if the patient needs post-hospital services based on physician/advanced practitioner order or complex needs related to functional status, cognitive ability, or social support system  Outcome: Progressing     Problem: Knowledge Deficit  Goal: Patient/family/caregiver demonstrates understanding of disease process, treatment plan, medications, and discharge instructions  Description: Complete learning assessment and assess knowledge base    Interventions:  - Provide teaching at level of understanding  - Provide teaching via preferred learning methods  Outcome: Progressing     Problem: Potential for Falls  Goal: Patient will remain free of falls  Description: INTERVENTIONS:  - Educate patient/family on patient safety including physical limitations  - Instruct patient to call for assistance with activity   - Consult OT/PT to assist with strengthening/mobility   - Keep Call bell within reach  - Keep bed low and locked with side rails adjusted as appropriate  - Keep care items and personal belongings within reach  - Initiate and maintain comfort rounds  - Make Fall Risk Sign visible to staff  - Offer Toileting every 2 Hours, in advance of need  - Initiate/Maintain bed alarm  - Obtain necessary fall risk management equipment:   - Apply yellow socks and bracelet for high fall risk patients  - Consider moving patient to room near nurses station  Outcome: Progressing     Problem: Prexisting or High Potential for Compromised Skin Integrity  Goal: Skin integrity is maintained or improved  Description: INTERVENTIONS:  - Identify patients at risk for skin breakdown  - Assess and monitor skin integrity  - Assess and monitor nutrition and hydration status  - Monitor labs   - Assess for incontinence   - Turn and reposition patient  - Assist with mobility/ambulation  - Relieve pressure over bony prominences  - Avoid friction and shearing  - Provide appropriate hygiene as needed including keeping skin clean and dry  - Evaluate need for skin moisturizer/barrier cream  - Collaborate with interdisciplinary team   - Patient/family teaching  - Consider wound care consult   Outcome: Progressing

## 2022-10-28 NOTE — CONSULTS
Vancomycin IV Pharmacy-to-Dose Consultation    Luis A Tavares is a 78 y o  male who was receiving vancomycin IV with management by the Pharmacy Consult service  The patient's vancomycin therapy has been discontinued  Thank you for allowing us to take part in this patient's care  Pharmacy will sign-off at this point; please call if there are any questions        Maria R Witt, PharmD, [facility] 6 Pharmacist   [phone]

## 2022-10-28 NOTE — NURSING NOTE
Patient refusing hourly blood pressure cuff monitoring  Pt educated on the importance of monitoring hemodynamics  Patient states he is okay with blood pressure monitoring every 4 hours  MD Maurilio Johnston made aware

## 2022-10-28 NOTE — PROGRESS NOTES
Critical Care Attending Note     78year old man with multiple medical problems to include severe COPD FEV1 33%, chronic hypoxic/hypercarbic respiratory failure HN 7E/OAN, chronic diastolic CHF, atrial fibrillation on eliquis, lung cancer s/p SBRT, chronic right exudative/lymphocytic effusion, DM, GERD with recently GIB secondary to duodenal ulcer, HTN, HLP, and suspected JACQUELINE/OHVS  Raquel Contreras has had numerous hospitalization (15 this year to date) with recent discharge 2 days ago for GIB secondary to duodenal ulcer post APC   Just prior to that he had COPD exacerbation and completed OCS  Raquel Contreras presented 10/26 for respiratory distress and noted significant hypertension  Failed BiPAP trial and required intubation  Underwent extubation 10/27      24hr events - tolerated extubation, weaned to NC  Overall improved, had melena at home none since admission, no chest pain, stable dyspnea, no sputum production          VS AF, HR 70-80's, MAPS 70-80's, SpO2 98% 2LNC, I/Os +180cc   Exam  GEN Obese older man in bed, conversant nontoxic   HEENT MMM, no thrush, ATNC, conjugate gaze  NECK no accessory muscle use, unable to determine JVD  CV reg, single s1/2, no m/r  Pulm diminished BS diffusely, resolved rales, no rhonchi or wheeze, diminished at right base w/o pleural rub  ABD +BS Soft NTND, no rebound  EXT 1-2+ dependent LE edema, improve distal edema, warm, brisk cap refill    Laboratory and Diagnostics  Results from last 7 days   Lab Units 10/28/22  0432 10/27/22  0431 10/26/22  1230 10/26/22  1213 10/24/22  0559 10/23/22  0457 10/22/22  1802 10/22/22  0908 10/22/22  0632 10/21/22  1654 10/21/22  0812   WBC Thousand/uL 6 32 6 76 8 47  --  8 02 8 87  --   --  9 91  --  12 33*   HEMOGLOBIN g/dL 6 3* 7 0* 7 9*  --  7 3* 7 8* 7 9*   < > 7 4*   < > 7 3*   I STAT HEMOGLOBIN g/dl  --   --   --  7 8*  --   --   --   --   --   --   --    HEMATOCRIT % 20 5* 22 5* 25 6*  --  23 1* 24 4* 24 2*   < > 22 6*   < > 22 3*   HEMATOCRIT, ISTAT %  --   -- --  23*  --   --   --   --   --   --   --    PLATELETS Thousands/uL 120* 124* 127*  --  89* 98*  --   --  88*  --  110*   NEUTROS PCT %  --  91* 86*  --   --  78*  --   --  84*  --   --    MONOS PCT %  --  5 6  --   --  10  --   --  9  --   --     < > = values in this interval not displayed       Results from last 7 days   Lab Units 10/28/22  0432 10/27/22  0431 10/26/22  1230 10/26/22  1213 10/24/22  0559 10/23/22  0457 10/22/22  0632 10/21/22  0812   SODIUM mmol/L 138 140 136  --  134* 139 140 140   POTASSIUM mmol/L 4 7 5 1 5 5*  --  4 4 3 9 3 9 4 7   CHLORIDE mmol/L 105 106 103  --  106 104 106 105   CO2 mmol/L 29 34* 27  --  21 30 29 29   CO2, I-STAT mmol/L  --   --   --  32  --   --   --   --    ANION GAP mmol/L 4 0* 6  --  7 5 5 6   BUN mg/dL 56* 51* 49*  --  58* 67* 72* 91*   CREATININE mg/dL 2 53* 2 33* 2 52*  --  2 50* 2 39* 2 20* 2 44*   CALCIUM mg/dL 7 6* 8 8 8 1*  --  7 6* 7 8* 7 9* 7 9*   GLUCOSE RANDOM mg/dL 277* 142* 530*  --  95 61* 43* 159*   ALT U/L  --  23 30  --   --   --   --   --    AST U/L  --  10 20  --   --   --   --   --    ALK PHOS U/L  --  77 100  --   --   --   --   --    ALBUMIN g/dL  --  2 2* 2 2*  --   --   --   --   --    TOTAL BILIRUBIN mg/dL  --  0 23 0 29  --   --   --   --   --      Results from last 7 days   Lab Units 10/28/22  0432 10/27/22  0431   MAGNESIUM mg/dL 2 2 2 7*   PHOSPHORUS mg/dL 4 3* 5 2*               Results from last 7 days   Lab Units 10/26/22  1717 10/26/22  1404   LACTIC ACID mmol/L 1 3 3 5*                     Results from last 7 days   Lab Units 10/26/22  1409   PROCALCITONIN ng/ml 0 21       ABG:   Results from last 7 days   Lab Units 10/26/22  1603   PH ART  7 394   PCO2 ART mm Hg 41 6   PO2 ART mm Hg 140 0*   HCO3 ART mmol/L 24 8   BASE EXC ART mmol/L -0 1   ABG SOURCE  Radial, Left     MICRO  UA lrge LE, neg Nit, innum WBC, occ bact - >100K Klebsiella variicola   Bld Cx pending  Strep/legionella ag neg  Sputum Cx pending  MRSA neg  FLU/RSV/COVID-19 neg     RADIOGRAPHS - new images personally reviewed  CXR 10/26 - ETT in good position, OGT appears to be below HD but not fully captured, chronic right effusion with right hemithorax opacification and volume loss, hyperinflation of left hemithorax, mild vascular congestion     TTE 5/2022 - EF 60%, dilated RV but normal function     PFTS 10/2022 - Ratio 41%, FVC 61%, FEV1 33%, TLC 89%, %, DLCO 36%     Assessment  1  Acute on chronic hypoxic/hypercarbic respiratory failure  2  Severe COPD with possible exacerbation - noted wheezing on initial exams - none since likely cardiac wheeze  3  Suspected acute/chronic diastolic CHF with component of hypertensive emergency, volume overload with severe LE edema  4  CKD IV  5  Suspected Klebsiella UTI  6  Elevated lactate - resolved  7  Chronic exudative/lymphocytic effusion - neg cytology  8  Lung cancer post SBRT with rounded atelectasis and right hemithorax volume loss  9  Toxic/metabolic encephalopathy - resolved  10  Recent UGIB with duodenal ulcer  11  Anemia   12  DM  13   Mild hyperkalemia - resolved     PLAN  · NEURO - stable, delirium precautions  · CARDIAC - continue home BP meds - coreg/imdur, will given 40mg lasix now  · PULM - Extubated, wean O2, continue xopenex/atrovent, pulmicort/perforomist, monitor off systemic steroids for now  · GI - change PPI to IV BID, reconsult GI, continue diet for now given HD stability and melena since admission  · RENAL - monitor SCr, redose lasix 40mg today  · ID - plan transition CTX x 5 days total and stop vanc, follow up cultures as listed  · HEME - decrease Hgb, transfuse 1unit PRBCs now, holding eliquis for now  · ENDO - ISS - continue lantus 20units and add 8units TID with meals  · ICU Care - SCDs alone, PPI/CHG/HOB >30deg  · Full Code  · CC team to update family later this afternoon  · Given need for PRBCs, will monitor in ICU through day for stability stable for downgrade Level I SDU status    I have personally seen and examined the patient on 10/28/2022(date)  I discussed the patient with the Resident including, but not limited to, verifying findings; reviewing labs and x-rays; discussing with consultants; developing the plan of care with the bedside nurse; and discussing treatment plan with patient or surrogate  I have reviewed the note and assessment performed by the Resident, and agree with the documented findings and plan of care with the following additions/exceptions  Please see my following comments for details and adjustments   Critical care time, excluding procedures, teaching, family meetings, and excludes any prior time recorded by providers other than myself, (0) minutes      Alfonzo Michael

## 2022-10-29 PROBLEM — D64.9 ACUTE ON CHRONIC ANEMIA: Status: ACTIVE | Noted: 2022-10-29

## 2022-10-29 LAB
ABO GROUP BLD BPU: NORMAL
ANION GAP SERPL CALCULATED.3IONS-SCNC: 0 MMOL/L (ref 4–13)
BACTERIA SPT RESP CULT: ABNORMAL
BACTERIA SPT RESP CULT: ABNORMAL
BASOPHILS # BLD AUTO: 0.01 THOUSANDS/ÂΜL (ref 0–0.1)
BASOPHILS NFR BLD AUTO: 0 % (ref 0–1)
BPU ID: NORMAL
BUN SERPL-MCNC: 52 MG/DL (ref 5–25)
CALCIUM SERPL-MCNC: 8.2 MG/DL (ref 8.3–10.1)
CHLORIDE SERPL-SCNC: 103 MMOL/L (ref 96–108)
CO2 SERPL-SCNC: 35 MMOL/L (ref 21–32)
CREAT SERPL-MCNC: 2.39 MG/DL (ref 0.6–1.3)
CROSSMATCH: NORMAL
EOSINOPHIL # BLD AUTO: 0.1 THOUSAND/ÂΜL (ref 0–0.61)
EOSINOPHIL NFR BLD AUTO: 2 % (ref 0–6)
ERYTHROCYTE [DISTWIDTH] IN BLOOD BY AUTOMATED COUNT: 15.4 % (ref 11.6–15.1)
GFR SERPL CREATININE-BSD FRML MDRD: 25 ML/MIN/1.73SQ M
GLUCOSE SERPL-MCNC: 112 MG/DL (ref 65–140)
GLUCOSE SERPL-MCNC: 159 MG/DL (ref 65–140)
GLUCOSE SERPL-MCNC: 161 MG/DL (ref 65–140)
GLUCOSE SERPL-MCNC: 179 MG/DL (ref 65–140)
GLUCOSE SERPL-MCNC: 182 MG/DL (ref 65–140)
GLUCOSE SERPL-MCNC: 193 MG/DL (ref 65–140)
GRAM STN SPEC: ABNORMAL
HCT VFR BLD AUTO: 29.4 % (ref 36.5–49.3)
HGB BLD-MCNC: 8.9 G/DL (ref 12–17)
IMM GRANULOCYTES # BLD AUTO: 0.02 THOUSAND/UL (ref 0–0.2)
IMM GRANULOCYTES NFR BLD AUTO: 0 % (ref 0–2)
LYMPHOCYTES # BLD AUTO: 0.6 THOUSANDS/ÂΜL (ref 0.6–4.47)
LYMPHOCYTES NFR BLD AUTO: 12 % (ref 14–44)
MAGNESIUM SERPL-MCNC: 2.3 MG/DL (ref 1.6–2.6)
MCH RBC QN AUTO: 27.1 PG (ref 26.8–34.3)
MCHC RBC AUTO-ENTMCNC: 30.3 G/DL (ref 31.4–37.4)
MCV RBC AUTO: 90 FL (ref 82–98)
MONOCYTES # BLD AUTO: 0.48 THOUSAND/ÂΜL (ref 0.17–1.22)
MONOCYTES NFR BLD AUTO: 10 % (ref 4–12)
MRSA NOSE QL CULT: NORMAL
NEUTROPHILS # BLD AUTO: 3.71 THOUSANDS/ÂΜL (ref 1.85–7.62)
NEUTS SEG NFR BLD AUTO: 76 % (ref 43–75)
NRBC BLD AUTO-RTO: 0 /100 WBCS
PHOSPHATE SERPL-MCNC: 4 MG/DL (ref 2.3–4.1)
PLATELET # BLD AUTO: 127 THOUSANDS/UL (ref 149–390)
PMV BLD AUTO: 10.4 FL (ref 8.9–12.7)
POTASSIUM SERPL-SCNC: 4.5 MMOL/L (ref 3.5–5.3)
RBC # BLD AUTO: 3.28 MILLION/UL (ref 3.88–5.62)
SODIUM SERPL-SCNC: 138 MMOL/L (ref 135–147)
UNIT DISPENSE STATUS: NORMAL
UNIT PRODUCT CODE: NORMAL
UNIT PRODUCT VOLUME: 350 ML
UNIT RH: NORMAL
WBC # BLD AUTO: 4.92 THOUSAND/UL (ref 4.31–10.16)

## 2022-10-29 RX ORDER — PANTOPRAZOLE SODIUM 40 MG/1
40 TABLET, DELAYED RELEASE ORAL
Status: DISCONTINUED | OUTPATIENT
Start: 2022-10-29 | End: 2022-11-02 | Stop reason: HOSPADM

## 2022-10-29 RX ORDER — FUROSEMIDE 10 MG/ML
40 INJECTION INTRAMUSCULAR; INTRAVENOUS DAILY
Status: DISCONTINUED | OUTPATIENT
Start: 2022-10-29 | End: 2022-10-29

## 2022-10-29 RX ADMIN — ISOSORBIDE MONONITRATE 30 MG: 30 TABLET, EXTENDED RELEASE ORAL at 08:44

## 2022-10-29 RX ADMIN — PANTOPRAZOLE SODIUM 40 MG: 40 TABLET, DELAYED RELEASE ORAL at 08:44

## 2022-10-29 RX ADMIN — LEVALBUTEROL HYDROCHLORIDE 1.25 MG: 1.25 SOLUTION, CONCENTRATE RESPIRATORY (INHALATION) at 19:51

## 2022-10-29 RX ADMIN — IPRATROPIUM BROMIDE 0.5 MG: 0.5 SOLUTION RESPIRATORY (INHALATION) at 19:51

## 2022-10-29 RX ADMIN — INSULIN LISPRO 2 UNITS: 100 INJECTION, SOLUTION INTRAVENOUS; SUBCUTANEOUS at 17:38

## 2022-10-29 RX ADMIN — CEFTRIAXONE SODIUM 1000 MG: 10 INJECTION, POWDER, FOR SOLUTION INTRAVENOUS at 15:44

## 2022-10-29 RX ADMIN — ASPIRIN 81 MG: 81 TABLET, COATED ORAL at 08:43

## 2022-10-29 RX ADMIN — INSULIN LISPRO 8 UNITS: 100 INJECTION, SOLUTION INTRAVENOUS; SUBCUTANEOUS at 17:46

## 2022-10-29 RX ADMIN — INSULIN GLARGINE 20 UNITS: 100 INJECTION, SOLUTION SUBCUTANEOUS at 21:46

## 2022-10-29 RX ADMIN — INSULIN LISPRO 2 UNITS: 100 INJECTION, SOLUTION INTRAVENOUS; SUBCUTANEOUS at 12:04

## 2022-10-29 RX ADMIN — INSULIN LISPRO 8 UNITS: 100 INJECTION, SOLUTION INTRAVENOUS; SUBCUTANEOUS at 12:04

## 2022-10-29 RX ADMIN — FUROSEMIDE 40 MG: 10 INJECTION, SOLUTION INTRAMUSCULAR; INTRAVENOUS at 09:20

## 2022-10-29 RX ADMIN — APIXABAN 5 MG: 5 TABLET, FILM COATED ORAL at 17:37

## 2022-10-29 RX ADMIN — PANTOPRAZOLE SODIUM 40 MG: 40 TABLET, DELAYED RELEASE ORAL at 16:00

## 2022-10-29 RX ADMIN — CARVEDILOL 6.25 MG: 6.25 TABLET, FILM COATED ORAL at 08:43

## 2022-10-29 RX ADMIN — APIXABAN 5 MG: 5 TABLET, FILM COATED ORAL at 08:43

## 2022-10-29 RX ADMIN — INSULIN LISPRO 8 UNITS: 100 INJECTION, SOLUTION INTRAVENOUS; SUBCUTANEOUS at 08:33

## 2022-10-29 RX ADMIN — CARVEDILOL 6.25 MG: 6.25 TABLET, FILM COATED ORAL at 15:48

## 2022-10-29 RX ADMIN — INSULIN LISPRO 2 UNITS: 100 INJECTION, SOLUTION INTRAVENOUS; SUBCUTANEOUS at 08:27

## 2022-10-29 RX ADMIN — BUDESONIDE 0.5 MG: 0.5 INHALANT ORAL at 19:51

## 2022-10-29 NOTE — PLAN OF CARE
Problem: MOBILITY - ADULT  Goal: Maintain or return to baseline ADL function  Description: INTERVENTIONS:  -  Assess patient's ability to carry out ADLs; assess patient's baseline for ADL function and identify physical deficits which impact ability to perform ADLs (bathing, care of mouth/teeth, toileting, grooming, dressing, etc )  - Assess/evaluate cause of self-care deficits   - Assess range of motion  - Assess patient's mobility; develop plan if impaired  - Assess patient's need for assistive devices and provide as appropriate  - Encourage maximum independence but intervene and supervise when necessary  - Involve family in performance of ADLs  - Assess for home care needs following discharge   - Consider OT consult to assist with ADL evaluation and planning for discharge  - Provide patient education as appropriate  Outcome: Progressing  Goal: Maintains/Returns to pre admission functional level  Description: INTERVENTIONS:  - Perform BMAT or MOVE assessment daily    - Set and communicate daily mobility goal to care team and patient/family/caregiver  - Collaborate with rehabilitation services on mobility goals if consulted  - Perform Range of Motion 3 times a day  - Reposition patient every 2 hours    - Dangle patient 3 times a day  - Stand patient 3 times a day  - Ambulate patient 3 times a day  - Out of bed to chair 3 times a day   - Out of bed for meals 3 times a day  - Out of bed for toileting  - Record patient progress and toleration of activity level   Outcome: Progressing     Problem: PAIN - ADULT  Goal: Verbalizes/displays adequate comfort level or baseline comfort level  Description: Interventions:  - Encourage patient to monitor pain and request assistance  - Assess pain using appropriate pain scale  - Administer analgesics based on type and severity of pain and evaluate response  - Implement non-pharmacological measures as appropriate and evaluate response  - Consider cultural and social influences on pain and pain management  - Notify physician/advanced practitioner if interventions unsuccessful or patient reports new pain  Outcome: Progressing     Problem: INFECTION - ADULT  Goal: Absence or prevention of progression during hospitalization  Description: INTERVENTIONS:  - Assess and monitor for signs and symptoms of infection  - Monitor lab/diagnostic results  - Monitor all insertion sites, i e  indwelling lines, tubes, and drains  - Monitor endotracheal if appropriate and nasal secretions for changes in amount and color  - [place] appropriate cooling/warming therapies per order  - Administer medications as ordered  - Instruct and encourage patient and family to use good hand hygiene technique  - Identify and instruct in appropriate isolation precautions for identified infection/condition  Outcome: Progressing  Goal: Absence of fever/infection during neutropenic period  Description: INTERVENTIONS:  - Monitor WBC    Outcome: Progressing     Problem: SAFETY ADULT  Goal: Maintain or return to baseline ADL function  Description: INTERVENTIONS:  -  Assess patient's ability to carry out ADLs; assess patient's baseline for ADL function and identify physical deficits which impact ability to perform ADLs (bathing, care of mouth/teeth, toileting, grooming, dressing, etc )  - Assess/evaluate cause of self-care deficits   - Assess range of motion  - Assess patient's mobility; develop plan if impaired  - Assess patient's need for assistive devices and provide as appropriate  - Encourage maximum independence but intervene and supervise when necessary  - Involve family in performance of ADLs  - Assess for home care needs following discharge   - Consider OT consult to assist with ADL evaluation and planning for discharge  - Provide patient education as appropriate  Outcome: Progressing  Goal: Maintains/Returns to pre admission functional level  Description: INTERVENTIONS:  - Perform BMAT or MOVE assessment daily    - Set and communicate daily mobility goal to care team and patient/family/caregiver  - Collaborate with rehabilitation services on mobility goals if consulted  - Perform Range of Motion 3 times a day  - Reposition patient every 2 hours    - Dangle patient 3 times a day  - Stand patient 3 times a day  - Ambulate patient 3 times a day  - Out of bed to chair 3 times a day   - Out of bed for meals 3 times a day  - Out of bed for toileting  - Record patient progress and toleration of activity level   Outcome: Progressing  Goal: Patient will remain free of falls  Description: INTERVENTIONS:  - Educate patient/family on patient safety including physical limitations  - Instruct patient to call for assistance with activity   - Consult OT/PT to assist with strengthening/mobility   - Keep Call bell within reach  - Keep bed low and locked with side rails adjusted as appropriate  - Keep care items and personal belongings within reach  - Initiate and maintain comfort rounds  - Make Fall Risk Sign visible to staff  - Offer Toileting every 2 Hours, in advance of need  - Initiate/Maintain bed alarm  - Obtain necessary fall risk management equipment: bed alarm  - Apply yellow socks and bracelet for high fall risk patients  - Consider moving patient to room near nurses station  Outcome: Progressing     Problem: DISCHARGE PLANNING  Goal: Discharge to home or other facility with appropriate resources  Description: INTERVENTIONS:  - Identify barriers to discharge w/patient and caregiver  - Arrange for needed discharge resources and transportation as appropriate  - Identify discharge learning needs (meds, wound care, etc )  - Arrange for interpretive services to assist at discharge as needed  - Refer to Case Management Department for coordinating discharge planning if the patient needs post-hospital services based on physician/advanced practitioner order or complex needs related to functional status, cognitive ability, or social support system  Outcome: Progressing     Problem: Knowledge Deficit  Goal: Patient/family/caregiver demonstrates understanding of disease process, treatment plan, medications, and discharge instructions  Description: Complete learning assessment and assess knowledge base    Interventions:  - Provide teaching at level of understanding  - Provide teaching via preferred learning methods  Outcome: Progressing     Problem: Potential for Falls  Goal: Patient will remain free of falls  Description: INTERVENTIONS:  - Educate patient/family on patient safety including physical limitations  - Instruct patient to call for assistance with activity   - Consult OT/PT to assist with strengthening/mobility   - Keep Call bell within reach  - Keep bed low and locked with side rails adjusted as appropriate  - Keep care items and personal belongings within reach  - Initiate and maintain comfort rounds  - Make Fall Risk Sign visible to staff  - Offer Toileting every 2 Hours, in advance of need  - Initiate/Maintain bed alarm  - Obtain necessary fall risk management equipment: bed alarm  - Apply yellow socks and bracelet for high fall risk patients  - Consider moving patient to room near nurses station  Outcome: Progressing     Problem: Prexisting or High Potential for Compromised Skin Integrity  Goal: Skin integrity is maintained or improved  Description: INTERVENTIONS:  - Identify patients at risk for skin breakdown  - Assess and monitor skin integrity  - Assess and monitor nutrition and hydration status  - Monitor labs   - Assess for incontinence   - Turn and reposition patient  - Assist with mobility/ambulation  - Relieve pressure over bony prominences  - Avoid friction and shearing  - Provide appropriate hygiene as needed including keeping skin clean and dry  - Evaluate need for skin moisturizer/barrier cream  - Collaborate with interdisciplinary team   - Patient/family teaching  - Consider wound care consult   Outcome: Progressing

## 2022-10-29 NOTE — PROGRESS NOTES
78year old man with multiple medical problems to include severe COPD FEV1 33%, chronic hypoxic/hypercarbic respiratory failure JF 7W/FXN, chronic diastolic CHF, atrial fibrillation on eliquis, lung cancer s/p SBRT, chronic right exudative/lymphocytic effusion, DM, GERD with recently GIB secondary to duodenal ulcer, HTN, HLP, and suspected JACQUELINE/OHVS  Alysha Gustafson has had numerous hospitalization (15 this year to date) with recent discharge 2 days ago for GIB secondary to duodenal ulcer post APC   Just prior to that he had COPD exacerbation and completed OCS  Alysha Gustafson presented 10/26 for respiratory distress and noted significant hypertension  Failed BiPAP trial and required intubation  Underwent extubation 10/27      24hr events - transfused 1unit PRBCs with good response  Good response to diuresis  Overnight afib with asymptomatic bradycardia    Feels well, no pain, poor sleep, no BM noted    VS AF, HR 60-80's, MAPS 70-90's, SpO2 100% 3LNC, I/Os -1 7L  Exam  GEN older man in bed, awake, nontoxic, conversant  HEENT ATNC, MMM, no thrush  NECK no accessory muscle use  CV reg, single s1/2, no m/r  Pulm diminished at right base, no wheeze, no rales appreciated  ABD +BS soft NTND, no rebound  EXT 1+ dependent edema - improving, warm, brisk cap refill    Laboratory and Diagnostics  Results from last 7 days   Lab Units 10/29/22  0429 10/28/22  2042 10/28/22  1546 10/28/22  0742 10/28/22  0432 10/27/22  0431 10/26/22  1230 10/26/22  1213 10/24/22  0559 10/23/22  0457   WBC Thousand/uL 4 92 5 77  --   --  6 32 6 76 8 47  --  8 02 8 87   HEMOGLOBIN g/dL 8 9* 7 7* 8 1* 6 9* 6 3* 7 0* 7 9*  --  7 3* 7 8*   I STAT HEMOGLOBIN   --   --   --   --   --   --   --    < >  --   --    HEMATOCRIT % 29 4* 23 7* 26 0* 22 3* 20 5* 22 5* 25 6*  --  23 1* 24 4*   HEMATOCRIT, ISTAT   --   --   --   --   --   --   --    < >  --   --    PLATELETS Thousands/uL 127* 127*  --   --  120* 124* 127*  --  89* 98*   NEUTROS PCT % 76*  --   --   --  79* 91* 86*  -- --  78*   MONOS PCT % 10  --   --   --  10 5 6  --   --  10    < > = values in this interval not displayed       Results from last 7 days   Lab Units 10/29/22  0429 10/28/22  2042 10/28/22  0432 10/27/22  0431 10/26/22  1230 10/26/22  1213 10/24/22  0559 10/23/22  0457   SODIUM mmol/L 138 137 138 140 136  --  134* 139   POTASSIUM mmol/L 4 5 4 6 4 7 5 1 5 5*  --  4 4 3 9   CHLORIDE mmol/L 103 103 105 106 103  --  106 104   CO2 mmol/L 35* 30 29 34* 27  --  21 30   CO2, I-STAT mmol/L  --   --   --   --   --  32  --   --    ANION GAP mmol/L 0* 4 4 0* 6  --  7 5   BUN mg/dL 52* 55* 56* 51* 49*  --  58* 67*   CREATININE mg/dL 2 39* 2 61* 2 53* 2 33* 2 52*  --  2 50* 2 39*   CALCIUM mg/dL 8 2* 8 2* 7 6* 8 8 8 1*  --  7 6* 7 8*   GLUCOSE RANDOM mg/dL 182* 176* 277* 142* 530*  --  95 61*   ALT U/L  --   --   --  23 30  --   --   --    AST U/L  --   --   --  10 20  --   --   --    ALK PHOS U/L  --   --   --  77 100  --   --   --    ALBUMIN g/dL  --   --   --  2 2* 2 2*  --   --   --    TOTAL BILIRUBIN mg/dL  --   --   --  0 23 0 29  --   --   --      Results from last 7 days   Lab Units 10/29/22  0429 10/28/22  2042 10/28/22  0432 10/27/22  0431   MAGNESIUM mg/dL 2 3 2 4 2 2 2 7*   PHOSPHORUS mg/dL 4 0 4 4* 4 3* 5 2*               Results from last 7 days   Lab Units 10/26/22  1717 10/26/22  1404   LACTIC ACID mmol/L 1 3 3 5*                     Results from last 7 days   Lab Units 10/26/22  1409   PROCALCITONIN ng/ml 0 21       ABG:   Results from last 7 days   Lab Units 10/26/22  1603   PH ART  7 394   PCO2 ART mm Hg 41 6   PO2 ART mm Hg 140 0*   HCO3 ART mmol/L 24 8   BASE EXC ART mmol/L -0 1   ABG SOURCE  Radial, Left       MICRO  UA lrge LE, neg Nit, innum WBC, occ bact - >100K Klebsiella variicola   Bld Cx NGTD  Strep/legionella ag neg  Sputum Cx few colonies Pseudomonas   MRSA neg  FLU/RSV/COVID-19 neg     RADIOGRAPHS - new images personally reviewed  CXR 10/26 - ETT in good position, OGT appears to be below HD but not fully captured, chronic right effusion with right hemithorax opacification and volume loss, hyperinflation of left hemithorax, mild vascular congestion     TTE 5/2022 - EF 60%, dilated RV but normal function     PFTS 10/2022 - Ratio 41%, FVC 61%, FEV1 33%, TLC 89%, %, DLCO 36%     Assessment  1  Acute on chronic hypoxic/hypercarbic respiratory failure  2  Severe COPD with possible exacerbation - noted wheezing on initial exams - none since likely cardiac wheeze  3  Suspected acute/chronic diastolic CHF with component of hypertensive emergency, volume overload with severe LE edema  4  CKD IV  5  Suspected Klebsiella UTI  6  Pseudomonas in sputum - suspected colonizer  7  Elevated lactate - resolved  8  Chronic exudative/lymphocytic effusion - neg cytology  9  Lung cancer post SBRT with rounded atelectasis and right hemithorax volume loss  10  Toxic/metabolic encephalopathy - resolved  11  Recent UGIB with duodenal ulcer  12  Anemia   13  DM  14  Mild hyperkalemia - resolved     PLAN  · NEURO - stable, delirium precautions  · CARDIAC - continue home BP meds - coreg/imdur, redose 40mg lasix now, resume eliquis now, cont ASA   · PULM - Extubated, wean O2, continue xopenex/atrovent, pulmicort/perforomist, monitor off systemic steroids for now  · GI - cont PPI BID, monitor for signs of bleeding however given stability can ADAT  · RENAL - monitor SCr, redose lasix 40mg today  · ID - Cont CTX x 5 days total   · HEME - Hgb now stable, resume eliquis  · ENDO - ISS - continue lantus 20units and 8units TID with meals  · ICU Care - SCDs alone, PPI/CHG/HOB >30deg  · Full Code  · Stable for downgrade M/S with telemetry    I have personally seen and examined the patient on 10/29/2022(date)   I discussed the patient with the Resident including, but not limited to, verifying findings; reviewing labs and x-rays; discussing with consultants; developing the plan of care with the bedside nurse; and discussing treatment plan with patient or surrogate  I have reviewed the note and assessment performed by the Resident, and agree with the documented findings and plan of care with the following additions/exceptions  Please see my following comments for details and adjustments   Critical care time, excluding procedures, teaching, family meetings, and excludes any prior time recorded by providers other than myself, (0) minutes     Mary Mcduffie, DO

## 2022-10-29 NOTE — ASSESSMENT & PLAN NOTE
Found to have hemoglobin dropped to 6 3 on 10/28  Was given 1 unit packed red blood cells with improvement to 8 1    Hemoglobin has thereafter remained stable  · Given recent history of PUD and reported melanotic stool at home, GI was consulted  · No current plan for EGD/colonoscopy  · Recent EGD 10/20 with duodenal ulcer   · Aspirin and Eliquis were restarted  · Continue PPI  · Continue to monitor CBC  · Transfuse for hemoglobin below 7

## 2022-10-29 NOTE — PROGRESS NOTES
Progress Note- Mita Holman Sr  78 y o  male MRN: [id number]    Unit/Bed#: MICU 03 Encounter: [id number]      Assessment and Plan:    Patient is a 61-year-old male with past medical history including but not limited to COPD, diabetes, CKD, CAD, lung cancer s/p SBRT, AFib on Eliquis at home for initially presented which shortness of breath,  was intubated in the ER, now improved and extubated  GI team consulted 10/28 for drop in hemoglobin up to 6 3  Of note he was recently discharged from the hospital during which he had GI bleed secondary to bleeding duodenal ulcer  1  Acute anemia suspected 2/2 to duodenal ulcer  Hemoglobin at baseline is around near normal but had presented earlier this month with hemoglobin of 6 4, underwent EGD 10/20, was found to have duodenal ulcer with the recent stigmata of bleeding which was treated  --GI team consulted given drop of hemoglobin from 7-6 3 yesterday, after 1 transfusion 10/28 patient's hemoglobin has improved to 8 9 this morning which is correcting appropriately  --at this time patient reports that his last bowel movement was few days ago, denies any blood in stool  --tolerated full regular breakfast without any complaints this AM  --will recommend to continue close monitoring, serial H and H, continuing PPI b i d   --continue aspirin, if [facility] is desired, prefer heparin drip  --please reach out to on call GI fellow if there is any worsening Hb drop or witnessed blood in stool or melena, in that case might need reevaluation with endoscopy over the weekend    Discussed with ICU team   ______________________________________________________________________    Subjective:     Patient seen and examined at bedside  Patient denies any active complaints at this time  Reports that he tolerated breakfast well this morning  Reports last bowel movement couple of days ago  Does not recall if he saw any blood in stool    Tolerating diet well, denies any postprandial abdominal pain or any complaints  Reports that his breathing is getting better as well      Medication Administration - last 24 hours from 10/28/2022 0659 to 10/29/2022 5024       Date/Time Order Dose Route Action Action by     10/29/2022 0031 propofol (DIPRIVAN) 1000 mg in 100 mL infusion (premix) 0 mcg/kg/min Intravenous Stopped Shobha Soares, RN     10/28/2022 2042 chlorhexidine (PERIDEX) 0 12 % oral rinse 15 mL 15 mL Mouth/Throat Not Given Ny Wood, RN     10/28/2022 1212 chlorhexidine (PERIDEX) 0 12 % oral rinse 15 mL 15 mL Mouth/Throat Not Given Judy Lazaro, RN     10/29/2022 0031 fentaNYL 1000 mcg in sodium chloride 0 9% 100mL infusion 0 mcg/hr Intravenous Stopped Shobha Standing, RN     10/28/2022 1900 budesonide (PULMICORT) inhalation solution 0 5 mg 0 5 mg Nebulization Not Given Tawny Fran, RT     10/28/2022 1857 budesonide (PULMICORT) inhalation solution 0 5 mg 0 5 mg Nebulization Given [facility], RT     10/28/2022 0829 budesonide (PULMICORT) inhalation solution 0 5 mg 0 5 mg Nebulization Given [facility] Larve     10/28/2022 1901 formoterol (PERFOROMIST) nebulizer solution 20 mcg 20 mcg Nebulization Not Given [facility], RT     10/28/2022 0829 formoterol (PERFOROMIST) nebulizer solution 20 mcg 20 mcg Nebulization Given Ashley Larve     10/28/2022 1901 levalbuterol (Skeet Rickers) inhalation solution 1 25 mg 1 25 mg Nebulization Not Given Tawny Kaska, RT     10/28/2022 1858 levalbuterol (Skeet Rickers) inhalation solution 1 25 mg 1 25 [facility]     10/28/2022 1443 levalbuterol (XOPENEX) inhalation solution 1 25 mg 1 25 mg Nebulization Not Given Dulcy Larve     10/28/2022 0829 levalbuterol (XOPENEX) inhalation solution 1 25 mg 1 25 mg Nebulization Given Ashley Larve     10/28/2022 1901 ipratropium (ATROVENT) 0 02 % inhalation solution 0 5 mg 0 5 mg Nebulization Not Given Tawny Gonsales, RT     10/28/2022 3644 ipratropium (ATROVENT) 0 02 % inhalation solution 0 5 mg 0 5 mg Nebulization Given Adrian Spray, RT     10/28/2022 1442 ipratropium (ATROVENT) 0 02 % inhalation solution 0 5 mg 0 5 mg Nebulization Not Given Carolyn Cummings     10/28/2022 0829 ipratropium (ATROVENT) 0 02 % inhalation solution 0 5 mg 0 5 mg Nebulization Given Carolyn Cummings     10/28/2022 1719 carvedilol (COREG) tablet 6 25 mg 6 25 mg Oral Given Renee Lepley     10/28/2022 0824 carvedilol (COREG) tablet 6 25 mg 6 25 mg Oral Not Given Angel Perez RN     10/28/2022 0825 isosorbide mononitrate (IMDUR) 24 hr tablet 30 mg 30 mg Oral Not Given Angel Perez RN     10/28/2022 2124 insulin glargine (LANTUS) subcutaneous injection 20 Units 0 2 mL 20 Units Subcutaneous Given Thomas Resendez RN     10/28/2022 1721 insulin lispro (HumaLOG) 100 units/mL subcutaneous injection 2-12 Units 2 Units Subcutaneous Given Renee Lepley     10/28/2022 1209 insulin lispro (HumaLOG) 100 units/mL subcutaneous injection 2-12 Units 2 Units Subcutaneous Given Agnel Perez RN     10/28/2022 0750 insulin lispro (HumaLOG) 100 units/mL subcutaneous injection 2-12 Units 4 Units Subcutaneous Given Angel Perez RN     10/28/2022 1209 insulin lispro (HumaLOG) 100 units/mL subcutaneous injection 7 Units 7 Units Subcutaneous Given nAgel Perez RN     10/28/2022 0749 insulin lispro (HumaLOG) 100 units/mL subcutaneous injection 7 Units 7 Units Subcutaneous Given Angel Perez RN     10/28/2022 1039 furosemide (LASIX) injection 40 mg 40 mg Intravenous Given Angel Perez RN     10/28/2022 1120 acetaminophen (TYLENOL) tablet 650 mg 650 mg Oral Given Angel Perez RN     10/28/2022 1552 cefTRIAXone (ROCEPHIN) 1,000 mg in dextrose 5 % 50 mL IVPB 1,000 mg Intravenous New Bag Renee Lepley     10/28/2022 1719 pantoprazole (PROTONIX) injection 40 mg 40 mg Intravenous Given Renee Lepley     10/28/2022 1721 insulin lispro (HumaLOG) 100 units/mL subcutaneous injection 8 Units 8 Units Subcutaneous Given Renee Lepley        Objective:     Vitals: Blood pressure 125/55, pulse 88, temperature 98 °F (36 7 °C), temperature source Oral, resp  rate (!) 24, weight 99 2 kg (218 lb 11 1 oz), SpO2 100 %  ,Body mass index is 29 66 kg/m²  Intake/Output Summary (Last 24 hours) at 10/29/2022 0659  Last data filed at 10/29/2022 0305  Gross per 24 hour   Intake 1588 2 ml   Output 3275 ml   Net -1686 8 ml     Physical Exam:   General Appearance: Awake and alert, in no acute distress, on oxygen  Abdomen: Soft, non-tender, non-distended; bowel sounds normal; no masses or no organomegaly    Invasive Devices  Report    Peripheral Intravenous Line  Duration           Peripheral IV 10/26/22 Left Antecubital 2 days    Peripheral IV 10/26/22 Right Wrist 2 days                Lab Results:  No results displayed because visit has over 200 results  Imaging Studies: I have personally reviewed pertinent imaging studies

## 2022-10-30 LAB
ANION GAP SERPL CALCULATED.3IONS-SCNC: 3 MMOL/L (ref 4–13)
BASOPHILS # BLD AUTO: 0.01 THOUSANDS/ÂΜL (ref 0–0.1)
BASOPHILS NFR BLD AUTO: 0 % (ref 0–1)
BUN SERPL-MCNC: 48 MG/DL (ref 5–25)
CALCIUM SERPL-MCNC: 8.3 MG/DL (ref 8.3–10.1)
CHLORIDE SERPL-SCNC: 105 MMOL/L (ref 96–108)
CO2 SERPL-SCNC: 32 MMOL/L (ref 21–32)
CREAT SERPL-MCNC: 2.23 MG/DL (ref 0.6–1.3)
EOSINOPHIL # BLD AUTO: 0.13 THOUSAND/ÂΜL (ref 0–0.61)
EOSINOPHIL NFR BLD AUTO: 2 % (ref 0–6)
ERYTHROCYTE [DISTWIDTH] IN BLOOD BY AUTOMATED COUNT: 15.1 % (ref 11.6–15.1)
GFR SERPL CREATININE-BSD FRML MDRD: 27 ML/MIN/1.73SQ M
GLUCOSE SERPL-MCNC: 104 MG/DL (ref 65–140)
GLUCOSE SERPL-MCNC: 122 MG/DL (ref 65–140)
GLUCOSE SERPL-MCNC: 239 MG/DL (ref 65–140)
GLUCOSE SERPL-MCNC: 242 MG/DL (ref 65–140)
GLUCOSE SERPL-MCNC: 250 MG/DL (ref 65–140)
HCT VFR BLD AUTO: 27.7 % (ref 36.5–49.3)
HGB BLD-MCNC: 8.5 G/DL (ref 12–17)
IMM GRANULOCYTES # BLD AUTO: 0.04 THOUSAND/UL (ref 0–0.2)
IMM GRANULOCYTES NFR BLD AUTO: 1 % (ref 0–2)
LYMPHOCYTES # BLD AUTO: 0.62 THOUSANDS/ÂΜL (ref 0.6–4.47)
LYMPHOCYTES NFR BLD AUTO: 11 % (ref 14–44)
MAGNESIUM SERPL-MCNC: 2.3 MG/DL (ref 1.6–2.6)
MCH RBC QN AUTO: 27.4 PG (ref 26.8–34.3)
MCHC RBC AUTO-ENTMCNC: 30.7 G/DL (ref 31.4–37.4)
MCV RBC AUTO: 89 FL (ref 82–98)
MONOCYTES # BLD AUTO: 0.51 THOUSAND/ÂΜL (ref 0.17–1.22)
MONOCYTES NFR BLD AUTO: 9 % (ref 4–12)
NEUTROPHILS # BLD AUTO: 4.13 THOUSANDS/ÂΜL (ref 1.85–7.62)
NEUTS SEG NFR BLD AUTO: 77 % (ref 43–75)
NRBC BLD AUTO-RTO: 0 /100 WBCS
PHOSPHATE SERPL-MCNC: 3.4 MG/DL (ref 2.3–4.1)
PLATELET # BLD AUTO: 130 THOUSANDS/UL (ref 149–390)
PMV BLD AUTO: 10.2 FL (ref 8.9–12.7)
POTASSIUM SERPL-SCNC: 4.3 MMOL/L (ref 3.5–5.3)
RBC # BLD AUTO: 3.1 MILLION/UL (ref 3.88–5.62)
SODIUM SERPL-SCNC: 140 MMOL/L (ref 135–147)
WBC # BLD AUTO: 5.44 THOUSAND/UL (ref 4.31–10.16)

## 2022-10-30 RX ORDER — FUROSEMIDE 10 MG/ML
40 INJECTION INTRAMUSCULAR; INTRAVENOUS ONCE
Status: COMPLETED | OUTPATIENT
Start: 2022-10-30 | End: 2022-10-30

## 2022-10-30 RX ORDER — TAMSULOSIN HYDROCHLORIDE 0.4 MG/1
0.4 CAPSULE ORAL
Status: DISCONTINUED | OUTPATIENT
Start: 2022-10-30 | End: 2022-11-02 | Stop reason: HOSPADM

## 2022-10-30 RX ADMIN — IPRATROPIUM BROMIDE 0.5 MG: 0.5 SOLUTION RESPIRATORY (INHALATION) at 07:22

## 2022-10-30 RX ADMIN — CARVEDILOL 6.25 MG: 6.25 TABLET, FILM COATED ORAL at 17:44

## 2022-10-30 RX ADMIN — LEVALBUTEROL HYDROCHLORIDE 1.25 MG: 1.25 SOLUTION, CONCENTRATE RESPIRATORY (INHALATION) at 12:53

## 2022-10-30 RX ADMIN — BUDESONIDE 0.5 MG: 0.5 INHALANT ORAL at 07:22

## 2022-10-30 RX ADMIN — PANTOPRAZOLE SODIUM 40 MG: 40 TABLET, DELAYED RELEASE ORAL at 05:37

## 2022-10-30 RX ADMIN — INSULIN GLARGINE 20 UNITS: 100 INJECTION, SOLUTION SUBCUTANEOUS at 21:46

## 2022-10-30 RX ADMIN — FORMOTEROL FUMARATE DIHYDRATE 20 MCG: 20 SOLUTION RESPIRATORY (INHALATION) at 20:34

## 2022-10-30 RX ADMIN — BUDESONIDE 0.5 MG: 0.5 INHALANT ORAL at 20:34

## 2022-10-30 RX ADMIN — FUROSEMIDE 40 MG: 10 INJECTION, SOLUTION INTRAMUSCULAR; INTRAVENOUS at 10:41

## 2022-10-30 RX ADMIN — TAMSULOSIN HYDROCHLORIDE 0.4 MG: 0.4 CAPSULE ORAL at 17:44

## 2022-10-30 RX ADMIN — PANTOPRAZOLE SODIUM 40 MG: 40 TABLET, DELAYED RELEASE ORAL at 17:44

## 2022-10-30 RX ADMIN — IPRATROPIUM BROMIDE 0.5 MG: 0.5 SOLUTION RESPIRATORY (INHALATION) at 12:53

## 2022-10-30 RX ADMIN — CEFTRIAXONE SODIUM 1000 MG: 10 INJECTION, POWDER, FOR SOLUTION INTRAVENOUS at 18:00

## 2022-10-30 RX ADMIN — INSULIN LISPRO 8 UNITS: 100 INJECTION, SOLUTION INTRAVENOUS; SUBCUTANEOUS at 17:47

## 2022-10-30 RX ADMIN — INSULIN LISPRO 8 UNITS: 100 INJECTION, SOLUTION INTRAVENOUS; SUBCUTANEOUS at 09:19

## 2022-10-30 RX ADMIN — ASPIRIN 81 MG: 81 TABLET, COATED ORAL at 09:23

## 2022-10-30 RX ADMIN — IPRATROPIUM BROMIDE 0.5 MG: 0.5 SOLUTION RESPIRATORY (INHALATION) at 20:34

## 2022-10-30 RX ADMIN — ISOSORBIDE MONONITRATE 30 MG: 30 TABLET, EXTENDED RELEASE ORAL at 09:22

## 2022-10-30 RX ADMIN — CARVEDILOL 6.25 MG: 6.25 TABLET, FILM COATED ORAL at 09:25

## 2022-10-30 RX ADMIN — APIXABAN 5 MG: 5 TABLET, FILM COATED ORAL at 17:44

## 2022-10-30 RX ADMIN — APIXABAN 5 MG: 5 TABLET, FILM COATED ORAL at 09:23

## 2022-10-30 RX ADMIN — INSULIN LISPRO 4 UNITS: 100 INJECTION, SOLUTION INTRAVENOUS; SUBCUTANEOUS at 09:19

## 2022-10-30 RX ADMIN — INSULIN LISPRO 8 UNITS: 100 INJECTION, SOLUTION INTRAVENOUS; SUBCUTANEOUS at 12:15

## 2022-10-30 RX ADMIN — LEVALBUTEROL HYDROCHLORIDE 1.25 MG: 1.25 SOLUTION, CONCENTRATE RESPIRATORY (INHALATION) at 20:34

## 2022-10-30 RX ADMIN — LEVALBUTEROL HYDROCHLORIDE 1.25 MG: 1.25 SOLUTION, CONCENTRATE RESPIRATORY (INHALATION) at 07:22

## 2022-10-30 NOTE — PROGRESS NOTES
[address] [facility]  Progress Note - Jesenia Wilson Sr  1945, 78 y o  male MRN: [id number]  Unit/Bed#: [facility] 752-06 Encounter: [id number]  Primary Care Provider: Yamilet Valenzuela MD   Date and time admitted to hospital: 10/26/2022 12:03 PM    * Acute on chronic respiratory failure with hypoxia and hypercapnia ([facility] [address] )  Assessment & Plan  Hx of COPD (on 2L at baseline), HFpEF, Lung adenocarcinoma, and recurrent right pleural effusions was found to be hypoxic on presentation  Initially, trialed on 6L O2 but did not improve and became more encephalopathic  Was intubated and admitted to ICU  Thought to be 2/2 CHF vs COPD exacerbation  Given IV lasix 40 mg x1 in ED with good urine output overnight  His oxygenation status improved and he was extubated  · Transferred out of ICU on 10/29  · Refusing to wear oxygen at times, education provided   · See chronic diastolic heart failure and COPD Assessment & Plans below    Acute on chronic anemia  Assessment & Plan  Found to have hemoglobin dropped to 6 3 on 10/28  Was given 1 unit packed red blood cells with improvement to 8 1    Hemoglobin has thereafter remained stable  · Given recent history of PUD and reported melanotic stool at home, GI was consulted  · No current plan for EGD/colonoscopy  · Recent EGD 10/20 with duodenal ulcer   · Aspirin and Eliquis were restarted  · Continue PPI  · Continue to monitor CBC  · Transfuse for hemoglobin below 7    Acute-on-chronic kidney injury [facility])  Assessment & Plan  Lab Results   Component Value Date    EGFR 26 10/27/2022    EGFR 23 10/26/2022    EGFR 24 10/24/2022    CREATININE 2 33 (H) 10/27/2022    CREATININE 2 52 (H) 10/26/2022    CREATININE 2 50 (H) 10/24/2022     Previous baseline Cr 1 4-1 7 however most recent baseline creatinine documented around 2 6-2 8 per renal notes from recent admission last week   · Likely CKD progression  · Creat with improvement with diuresis in ICU  · Will give another dose of IV lasix 10/30 and likely resume PO home dose (40 mg daily) on 10/31  · Trend BMP  · Avoid nephrotoxins or hypotension    Acute metabolic encephalopathy  Assessment & Plan  On presentation, was found to be encephalopathic likely 2/2 hypercapnia from respiratory failure  · Now resolved after being intubated and diuresed  · Monitor mental status     COPD (chronic obstructive pulmonary disease) ([facility])  Assessment & Plan  Hx of COPD with last PFTs showing FEV1/FVC 41%; FEV1 33%  · On 2L O2 at home  · On Incruse and Breo Ellipta at home  · Admitted for acute on chronic hypoxic hypercapnic respiratory failure thought to have component of COPD exacerbation  · Currently on Xopenex 1 25 mg TID, Atrovent 0 5 mg TID, Perforomist 20 mcg q12 and Pulmicort 0 5 mg q12  · Per ICU/pulmonology transfer note, monitor off systemic steroids for now, recently completed steroid course several days ago for COPD exacerbation    SIRS (systemic inflammatory response syndrome) ([facility] [address] )  Assessment & Plan  On presentation, patient was found to be meeting SIRS (tachycardia and tachypnea)  · Sepsis w/u was initiated  · UA showed innumerable WBC--urine cx + however difficult to ascertain if symptoms  · Blood cultures negative  · Was initially on broad-spectrum antibiotics, transitioned to IV ceftriaxone on 10/28 with plan to complete 5 days total per ICU transfer note  · Check and procalcitonin to help guide therapy    A-fib ([facility])  Assessment & Plan  On Eliquis 5 mg BID and Coreg 6 25 mg at home  · Continue home Eliquis and Coreg    History of prostate cancer  Assessment & Plan  hx of Prostate cancer s/p brachytherapy and EBRT  · Continue Flomax 0 4 mg     Adenocarcinoma of lung ([facility])  Assessment & Plan  Hx of Stage 1A2 lung adenocarcinoma s/p SBRT  · Follows with Pulmonology outpatient     CAD (coronary artery disease)  Assessment & Plan  Has hx of MI s/p PCI   Of note had negative stress test May  · Continue asa/bb/statin/imdur    Essential hypertension  Assessment & Plan  On Coreg 6 25 BID and Imdur 30 mg daily PTA  · Continue, monitor    Chronic diastolic heart failure (HCC)  Assessment & Plan  Wt Readings from Last 3 Encounters:   10/27/22 101 kg (221 lb 12 5 oz)   10/23/22 95 8 kg (211 lb 3 2 oz)   10/15/22 98 7 kg (217 lb 11 1 oz)     Last echo on 05/2022 showed EF 60%; Moderately dilated RV, Mild AV and TV regurg  · At home is on Lasix 40 mg daily, Imdur 30 mg daily, and Coreg 6 25 mg BID  · On presentation, found to be volume overloaded   · S/p several doses of IV lasix--will give another today 10/30, plan to transition to home dose p o  Lasix possibly on 10/31          Pleural effusion on right  Assessment & Plan  Hx of lung adenocarcinoma s/p SBRT complicated by recurrent right pleural effusions likely 2/2 effects of radiation  · On most recent tap (04/2021) fluid found to be lymphocytic and exudative  · CXR on 10/26 showed chronic right-sided pleural effusion with mid-line shift  · Monitor for need for thoracentesis    Type 2 diabetes mellitus with hyperglycemia, with long-term current use of insulin [facility])  Assessment & Plan  Lab Results   Component Value Date    HGBA1C 9 7 (H) 10/10/2022       Recent Labs     10/27/22  0812 10/27/22  0814 10/27/22  1051 10/27/22  1141   POCGLU 36* 221* 229* 227*       Blood Sugar Average: Last 72 hrs:  (P) 207 25     A1c 9 7  · On most recent hospital discharge (10/24/22) was discharged on 40U lantus and Humalog 15U TID per Endocrinology recommendations  · On presentation BG elevated to 500s and was started on insulin drip  · Currently on Lantus 20 units HS Humalog 8 units t i d  with meals  · Titrate p r n      HLD (hyperlipidemia)  Assessment & Plan  On Simvastatin 40 mg at home--we do not carry this, patient with allergy" to alternative  · Resume home statin at discharge        VTE Pharmacologic Prophylaxis: VTE Score: 9 Moderate Risk (Score 3-4) - Pharmacological DVT Prophylaxis Ordered: apixaban (Eliquis)  Patient Centered Rounds: I performed bedside rounds with nursing staff today  Discussions with Specialists or Other Care Team Provider: appreciate specialists    Education and Discussions with Family / Patient: Patient declined call to   Time Spent for Care: 30 minutes  More than 50% of total time spent on counseling and coordination of care as described above  Current Length of Stay: 4 day(s)  Current Patient Status: Inpatient   Certification Statement: The patient will continue to require additional inpatient hospital stay due to IV lasix, PT/OT recs   Discharge Plan: Anticipate discharge in 48-72 hrs to discharge location to be determined pending rehab evaluations  Code Status: Level 1 - Full Code    Subjective:   Pt keeps eyes closed for most of exam  Denies complaints  Refusing to wear oxygen, education provided  Objective:     Vitals:   Temp (24hrs), Av °F (36 7 °C), Min:97 3 °F (36 3 °C), Max:98 6 °F (37 °C)    Temp:  [97 3 °F (36 3 °C)-98 6 °F (37 °C)] 97 6 °F (36 4 °C)  HR:  [68-86] 80  Resp:  [18-38] 19  BP: ()/(50-68) 153/68  SpO2:  [84 %-100 %] 84 %  Body mass index is 27 81 kg/m²  Input and Output Summary (last 24 hours): Intake/Output Summary (Last 24 hours) at 10/30/2022 0942  Last data filed at 10/30/2022 0901  Gross per 24 hour   Intake 800 ml   Output 2070 ml   Net -1270 ml       Physical Exam:   Physical Exam  Vitals and nursing note reviewed  Constitutional:       Appearance: He is obese  Comments: On RA (refusing to wear o2)--sats 89%   Cardiovascular:      Rate and Rhythm: Regular rhythm  Bradycardia present  Pulmonary:      Effort: No respiratory distress  Comments: Poor effort, no obvious distress  Abdominal:      General: There is no distension  Musculoskeletal:      Right lower leg: No edema  Left lower leg: No edema     Neurological:      Comments: Awakens to voice, does not participate much in exam--keeps eyes closed   Psychiatric:      Comments: Flat         Additional Data:     Labs:  Results from last 7 days   Lab Units 10/30/22  0531   WBC Thousand/uL 5 44   HEMOGLOBIN g/dL 8 5*   HEMATOCRIT % 27 7*   PLATELETS Thousands/uL 130*   NEUTROS PCT % 77*   LYMPHS PCT % 11*   MONOS PCT % 9   EOS PCT % 2     Results from last 7 days   Lab Units 10/30/22  0531 10/28/22  0432 10/27/22  0431   SODIUM mmol/L 140   < > 140   POTASSIUM mmol/L 4 3   < > 5 1   CHLORIDE mmol/L 105   < > 106   CO2 mmol/L 32   < > 34*   BUN mg/dL 48*   < > 51*   CREATININE mg/dL 2 23*   < > 2 33*   ANION GAP mmol/L 3*   < > 0*   CALCIUM mg/dL 8 3   < > 8 8   ALBUMIN g/dL  --   --  2 2*   TOTAL BILIRUBIN mg/dL  --   --  0 23   ALK PHOS U/L  --   --  77   ALT U/L  --   --  23   AST U/L  --   --  10   GLUCOSE RANDOM mg/dL 250*   < > 142*    < > = values in this interval not displayed  Results from last 7 days   Lab Units 10/30/22  0756 10/29/22  2133 10/29/22  1734 10/29/22  1552 10/29/22  1110 10/29/22  0721 10/28/22  1721 10/28/22  1657 10/28/22  1208 10/28/22  0749 10/28/22  0559 10/27/22  2239   POC GLUCOSE mg/dl 242* 112 161* 193* 159* 179* 155* 136 153* 244* 266* 299*         Results from last 7 days   Lab Units 10/26/22  1717 10/26/22  1409 10/26/22  1404   LACTIC ACID mmol/L 1 3  --  3 5*   PROCALCITONIN ng/ml  --  0 21  --        Lines/Drains:  Invasive Devices  Report    Peripheral Intravenous Line  Duration           Peripheral IV 10/30/22 Right Antecubital <1 day                  Telemetry:  Telemetry Orders (From admission, onward)             48 Hour Telemetry Monitoring  Continuous x 48 hours        References:    Telemetry Guidelines   Question:  Reason for 48 Hour Telemetry  Answer:  Arrhythmias Requiring Medical Therapy (eg  SVT, Vtach/fib, Bradycardia, Uncontrolled A-fib)                 Telemetry Reviewed: Sinus Bradycardia  Indication for Continued Telemetry Use: No indication for continued use  Will discontinue  Imaging: Reviewed radiology reports from this admission including: chest xray    Recent Cultures (last 7 days):   Results from last 7 days   Lab Units 10/26/22  1533 10/26/22  1532 10/26/22  1528 10/26/22  1527   BLOOD CULTURE   --   --  No Growth at 72 hrs    No Growth at 72 hrs   --    SPUTUM CULTURE   --   --   --  Few Colonies of Pseudomonas aeruginosa*  1+ Growth of    GRAM STAIN RESULT   --   --   --  No Polys or Bacteria seen   URINE CULTURE  >100,000 cfu/ml Klebsiella variicola*  --   --   --    LEGIONELLA URINARY ANTIGEN   --  Negative  --   --        Last 24 Hours Medication List:   Current Facility-Administered Medications   Medication Dose Route Frequency Provider Last Rate   • acetaminophen  975 mg Oral Q6H PRN Maria Elena Dorado MD     • apixaban  5 mg Oral BID Maria Elena Dorado MD     • aspirin  81 mg Oral Daily Maria Elena Dorado MD     • budesonide  0 5 mg Nebulization Q12H Maria Elena Dorado MD     • carvedilol  6 25 mg Oral BID With Meals Maria Elena Dorado MD     • cefTRIAXone  1,000 mg Intravenous Q24H Maria Elena Dorado MD 1,000 mg (10/29/22 1544)   • chlorhexidine  15 mL Mouth/Throat Q12H [address] Phoebe Sauceda MD     • fentanyl citrate (PF)  100 mcg Intravenous Q2H PRN Maria Elena Dorado MD     • formoterol  20 mcg Nebulization Q12H Maria Elena Dorado MD     • furosemide  40 mg Intravenous [facility]     • insulin glargine  20 Units Subcutaneous HS Maria Elena Dorado MD     • insulin lispro  2-12 Units Subcutaneous TID AC Phoebe Sauceda MD     • insulin lispro  8 Units Subcutaneous TID With Meals Maria Elena Dorado MD     • ipratropium  0 5 mg Nebulization TID Maria Elena Dorado MD     • isosorbide mononitrate  30 mg Oral Daily Maria Elena Dorado MD     • levalbuterol  1 25 mg Nebulization TID Maria Elena Dorado MD     • Lidocaine Viscous HCl  15 mL Swish & Swallow 4x Daily PRN Maria Elena Dorado MD     • pantoprazole  40 mg Oral BID AC Maria Elena Dorado MD     • tamsulosin  0 4 mg Oral Daily With [facility] [employer] [facility] Priest          Today, Patient Was Seen By: Alfonso Ortiz    **Please Note: This note may have been constructed using a voice recognition system  **

## 2022-10-30 NOTE — QUICK NOTE
QUICK NOTE - Deterioration Index  Lilly Khanna  78 y o  male MRN: [id number]  Unit/Bed#: [facility] 428-07 Encounter: [id number]    Date Paged: 10/30/22  Time Paged: 111 [facility]  Room #: 196  Arrival Time: 07  Deterioration index score at time of page: 61 05  %  Spoke with Brennen Dinero RN from primary team  Need to escalate level of care: no     PROBLEMS resulting in high DI score: Factors Contributing to Score    Contribution Factor Value   31% Pulse oximetry 69 %   21 Age 78years old   18% Supplemental oxygen Nasal cannula   11% [place] coma scale 14   7% Respiratory rate 19   4% Systolic 975   3% Hematocrit abnormal (27 7 %)     Contribution Factor Value   2% Pulse 52   2% Potassium 4 3 mmol/L   2% BUN abnormal (48 mg/dL)   1% Platelet count abnormal (130 Thousands/uL)   <1% Sodium 140 mmol/L   <1% Temperature 97 6 °F (36 4 °C)   <1% WBC count 5 44 Thousand/uL              •     PLAN:    • SPO2 falsely documented  • GCS 14 for confusion for this admission    Please contact critical care via [employer] with any questions or concerns       Vitals:   Vitals:    10/29/22 2240 10/30/22 0337 10/30/22 0537 10/30/22 0634   BP: 138/65 153/60  153/68   BP Location:       Pulse: 72 78  80   Resp: 18 18  19   Temp: (!) 97 3 °F (36 3 °C) 98 6 °F (37 °C)  97 6 °F (36 4 °C)   TempSrc:  Oral     SpO2: 97% 96%  99%   Weight:   93 kg (205 lb 0 4 oz)        Respiratory:  SpO2: SpO2: 99 %, SpO2 Activity: SpO2 Activity: At Rest, SpO2 Device: O2 Device: Nasal cannula  Nasal Cannula O2 Flow Rate (L/min): 2 L/min    Temperature: Temp (24hrs), Av °F (36 7 °C), Min:97 3 °F (36 3 °C), Max:98 6 °F (37 °C)  Current: Temperature: 97 6 °F (36 4 °C)    Labs:   Results from last 7 days   Lab Units 10/30/22  0531 10/29/22  0429 10/28/22  2042 10/28/22  0742 10/28/22  0432   WBC Thousand/uL 5 44 4 92 5 77  --  6 32   HEMOGLOBIN g/dL 8 5* 8 9* 7 7*   < > 6 3*   HEMATOCRIT % 27 7* 29 4* 23 7*   < > 20 5*   PLATELETS Thousands/uL 130* 127* 127*  --  120*   NEUTROS PCT % 77* 76*  --   --  79*   MONOS PCT % 9 10  --   --  10    < > = values in this interval not displayed  Results from last 7 days   Lab Units 10/30/22  0531 10/29/22  0429 10/28/22  2042 10/28/22  0432 10/27/22  0431 10/26/22  1230 10/26/22  1213   SODIUM mmol/L 140 138 137   < > 140 136  --    POTASSIUM mmol/L 4 3 4 5 4 6   < > 5 1 5 5*  --    CHLORIDE mmol/L 105 103 103   < > 106 103  --    CO2 mmol/L 32 35* 30   < > 34* 27  --    CO2, I-STAT mmol/L  --   --   --   --   --   --  32   BUN mg/dL 48* 52* 55*   < > 51* 49*  --    CREATININE mg/dL 2 23* 2 39* 2 61*   < > 2 33* 2 52*  --    CALCIUM mg/dL 8 3 8 2* 8 2*   < > 8 8 8 1*  --    ALK PHOS U/L  --   --   --   --  77 100  --    ALT U/L  --   --   --   --  23 30  --    AST U/L  --   --   --   --  10 20  --    GLUCOSE, ISTAT mg/dl  --   --   --   --   --   --  508*    < > = values in this interval not displayed       Results from last 7 days   Lab Units 10/30/22  0531 10/29/22  0429 10/28/22  2042   MAGNESIUM mg/dL 2 3 2 3 2 4     Results from last 7 days   Lab Units 10/26/22  1717 10/26/22  1404   LACTIC ACID mmol/L 1 3 3 5*         Results from last 7 days   Lab Units 10/26/22  1409   PROCALCITONIN ng/ml 0 21       Code Status: Level 1 - Full Code

## 2022-10-30 NOTE — PLAN OF CARE
Problem: MOBILITY - ADULT  Goal: Maintain or return to baseline ADL function  Description: INTERVENTIONS:  -  Assess patient's ability to carry out ADLs; assess patient's baseline for ADL function and identify physical deficits which impact ability to perform ADLs (bathing, care of mouth/teeth, toileting, grooming, dressing, etc )  - Assess/evaluate cause of self-care deficits   - Assess range of motion  - Assess patient's mobility; develop plan if impaired  - Assess patient's need for assistive devices and provide as appropriate  - Encourage maximum independence but intervene and supervise when necessary  - Involve family in performance of ADLs  - Assess for home care needs following discharge   - Consider OT consult to assist with ADL evaluation and planning for discharge  - Provide patient education as appropriate  Outcome: Progressing  Goal: Maintains/Returns to pre admission functional level  Description: INTERVENTIONS:  - Perform BMAT or MOVE assessment daily    - Set and communicate daily mobility goal to care team and patient/family/caregiver  - Collaborate with rehabilitation services on mobility goals if consulted  - Perform Range of Motion *3** times a day  - Reposition patient every **2* hours    - Dangle patient **3* times a day  - Stand patient *3** times a day  - Ambulate patient **3* times a day  - Out of bed to chair *3** times a day   - Out of bed for meals *3** times a day  - Out of bed for toileting  - Record patient progress and toleration of activity level   Outcome: Progressing     Problem: PAIN - ADULT  Goal: Verbalizes/displays adequate comfort level or baseline comfort level  Description: Interventions:  - Encourage patient to monitor pain and request assistance  - Assess pain using appropriate pain scale  - Administer analgesics based on type and severity of pain and evaluate response  - Implement non-pharmacological measures as appropriate and evaluate response  - Consider cultural and social influences on pain and pain management  - Notify physician/advanced practitioner if interventions unsuccessful or patient reports new pain  Outcome: Progressing     Problem: INFECTION - ADULT  Goal: Absence or prevention of progression during hospitalization  Description: INTERVENTIONS:  - Assess and monitor for signs and symptoms of infection  - Monitor lab/diagnostic results  - Monitor all insertion sites, i e  indwelling lines, tubes, and drains  - Monitor endotracheal if appropriate and nasal secretions for changes in amount and color  - [place] appropriate cooling/warming therapies per order  - Administer medications as ordered  - Instruct and encourage patient and family to use good hand hygiene technique  - Identify and instruct in appropriate isolation precautions for identified infection/condition  Outcome: Progressing  Goal: Absence of fever/infection during neutropenic period  Description: INTERVENTIONS:  - Monitor WBC    Outcome: Progressing     Problem: SAFETY ADULT  Goal: Maintain or return to baseline ADL function  Description: INTERVENTIONS:  -  Assess patient's ability to carry out ADLs; assess patient's baseline for ADL function and identify physical deficits which impact ability to perform ADLs (bathing, care of mouth/teeth, toileting, grooming, dressing, etc )  - Assess/evaluate cause of self-care deficits   - Assess range of motion  - Assess patient's mobility; develop plan if impaired  - Assess patient's need for assistive devices and provide as appropriate  - Encourage maximum independence but intervene and supervise when necessary  - Involve family in performance of ADLs  - Assess for home care needs following discharge   - Consider OT consult to assist with ADL evaluation and planning for discharge  - Provide patient education as appropriate  Outcome: Progressing  Goal: Maintains/Returns to pre admission functional level  Description: INTERVENTIONS:  - Perform BMAT or MOVE assessment daily    - Set and communicate daily mobility goal to care team and patient/family/caregiver  - Collaborate with rehabilitation services on mobility goals if consulted  - Perform Range of Motion *3** times a day  - Reposition patient every **2* hours    - Dangle patient **3* times a day  - Stand patient *3** times a day  - Ambulate patient *3** times a day  - Out of bed to chair *3** times a day   - Out of bed for meals *3** times a day  - Out of bed for toileting  - Record patient progress and toleration of activity level   Outcome: Progressing  Goal: Patient will remain free of falls  Description: INTERVENTIONS:  - Educate patient/family on patient safety including physical limitations  - Instruct patient to call for assistance with activity   - Consult OT/PT to assist with strengthening/mobility   - Keep Call bell within reach  - Keep bed low and locked with side rails adjusted as appropriate  - Keep care items and personal belongings within reach  - Initiate and maintain comfort rounds  - Make Fall Risk Sign visible to staff  - Offer Toileting every *2** Hours, in advance of need  - Initiate/Maintain *bed/chair *alarm  - Obtain necessary fall risk management equipment: - Apply yellow socks and bracelet for high fall risk patients  - Consider moving patient to room near nurses station  Outcome: Progressing     Problem: DISCHARGE PLANNING  Goal: Discharge to home or other facility with appropriate resources  Description: INTERVENTIONS:  - Identify barriers to discharge w/patient and caregiver  - Arrange for needed discharge resources and transportation as appropriate  - Identify discharge learning needs (meds, wound care, etc )  - Arrange for interpretive services to assist at discharge as needed  - Refer to Case Management Department for coordinating discharge planning if the patient needs post-hospital services based on physician/advanced practitioner order or complex needs related to functional status, cognitive ability, or social support system  Outcome: Progressing     Problem: Knowledge Deficit  Goal: Patient/family/caregiver demonstrates understanding of disease process, treatment plan, medications, and discharge instructions  Description: Complete learning assessment and assess knowledge base    Interventions:  - Provide teaching at level of understanding  - Provide teaching via preferred learning methods  Outcome: Progressing     Problem: Potential for Falls  Goal: Patient will remain free of falls  Description: INTERVENTIONS:  - Educate patient/family on patient safety including physical limitations  - Instruct patient to call for assistance with activity   - Consult OT/PT to assist with strengthening/mobility   - Keep Call bell within reach  - Keep bed low and locked with side rails adjusted as appropriate  - Keep care items and personal belongings within reach  - Initiate and maintain comfort rounds  - Make Fall Risk Sign visible to staff  - Offer Toileting every *2** Hours, in advance of need  - Initiate/Maintain *bed/chair**alarm  - Obtain necessary fall risk management equipment:  - Apply yellow socks and bracelet for high fall risk patients  - Consider moving patient to room near nurses station  Outcome: Progressing     Problem: Prexisting or High Potential for Compromised Skin Integrity  Goal: Skin integrity is maintained or improved  Description: INTERVENTIONS:  - Identify patients at risk for skin breakdown  - Assess and monitor skin integrity  - Assess and monitor nutrition and hydration status  - Monitor labs   - Assess for incontinence   - Turn and reposition patient  - Assist with mobility/ambulation  - Relieve pressure over bony prominences  - Avoid friction and shearing  - Provide appropriate hygiene as needed including keeping skin clean and dry  - Evaluate need for skin moisturizer/barrier cream  - Collaborate with interdisciplinary team   - Patient/family teaching  - Consider wound care consult   Outcome: Progressing

## 2022-10-30 NOTE — OCCUPATIONAL THERAPY NOTE
Occupational Therapy Evaluation     Patient Name: Robel Paulson  Today's Date: 10/30/2022  Problem List  Principal Problem:    Acute on chronic respiratory failure with hypoxia and hypercapnia (HCC)  Active Problems:    COPD (chronic obstructive pulmonary disease) (HCC)    HLD (hyperlipidemia)    Type 2 diabetes mellitus with hyperglycemia, with long-term current use of insulin (HCC)    Pleural effusion on right    Chronic diastolic heart failure (HCC)    Essential hypertension    CAD (coronary artery disease)    Acute metabolic encephalopathy    Adenocarcinoma of lung (HCC)    History of prostate cancer    A-fib (HCC)    Acute-on-chronic kidney injury ([facility] [address] )    SIRS (systemic inflammatory response syndrome) (HCC)    Acute on chronic anemia    Past Medical History  Past Medical History:   Diagnosis Date    Abdominal pain     MARANDA (acute kidney injury) ([facility] [address] ) 6/19/2018    Cardiac disease     CHF (congestive heart failure) (HCC)     COPD, severe (HCC)     Coronary artery disease     DDD (degenerative disc disease), lumbar 9/1/2020    Diabetes mellitus (HCC)     Dyspnea     GERD (gastroesophageal reflux disease)     Hyperlipidemia     Hypertension     MI (myocardial infarction) ([facility] [address] )     with 3 stents    Nodule of apex of right lung     JACQUELINE (obstructive sleep apnea)     Prostate cancer [facility])      Past Surgical History  Past Surgical History:   Procedure Laterality Date    ABDOMINAL SURGERY      exploratory    ANGIOPLASTY      3 stents    APPENDECTOMY      COLONOSCOPY  2015    CT NEEDLE BIOPSY LUNG  11/3/2020    ESOPHAGOGASTRODUODENOSCOPY N/A 10/2/2017    Procedure: ESOPHAGOGASTRODUODENOSCOPY (EGD); Surgeon: John Steel MD;  Location: [facility];   Service: Gastroenterology    IR THORACENTESIS  11/3/2020    KNEE CARTILAGE SURGERY      OTHER SURGICAL HISTORY      stent placement    PROSTATE SURGERY      SKIN GRAFT      Basal cell CA back           10/30/22 0933   OT Last Visit   OT Visit Date 10/30/22   Note Type Note type Evaluation   Pain Assessment   Pain Assessment Tool 0-10   Pain Score No Pain   Restrictions/Precautions   Weight Bearing Precautions Per Order No   Other Precautions Agitated; Fall Risk;Telemetry   Home Living   Type of [address] Two level;Performs ADLs on one level; Able to live on main level with bedroom/bathroom  (0 VINI)   Bathroom Shower/Tub Walk-in shower   Bathroom Toilet Raised   Bathroom Equipment Shower chair   Home Equipment Cane;Walker; Other (Comment)  (2L Home O2 baseline)   Additional Comments Pt lives in 2 SH, 0 VINI, 1st floor setup  Prior Function   Level of [place] Independent with ADLs   Lives With Son  (he is home during the day w/ pt)   Receives Help From Family   IADLs Independent with driving; Independent with meal prep; Independent with medication management   Falls in the last 6 months 0   Vocational Retired   Lifestyle   Autonomy I w/ ADLS/IADLS, Mod I w/ transfers and functional mobility PTA   Reciprocal Relationships Pt lives w/ his son who is home during the day   Service to Others Retired   [email or web address] to ride motorcycle   General   Additional Pertinent History recently discharged home 10/24 w/ Home OT/PT services; home therapy did not start yet prior to pt's current admission  Pt has several recent hospital stays, refusing further assessment/treatment from OT at this time  Additional General Comments Is irritable; reports no concerns regarding D/C home     ADL   Eating Assistance 7  Independent   Grooming Assistance 7  Independent   UB Bathing Assistance 5  Supervision/Setup   LB Bathing Assistance 5  Supervision/Setup   UB Dressing Assistance 5  Supervision/Setup   LB Dressing Assistance 5  Supervision/Setup   Toileting Assistance  5  Supervision/Setup   Functional Assistance 5  Supervision/Setup   Bed Mobility   Supine to Sit Unable to assess   Sit to Supine Unable to assess   Additional Comments pt seated up @ EOB upon entrance into room; left EOB at end of session  Transfers   Sit to Stand 5  Supervision   Additional items Verbal cues   Stand to Sit 5  Supervision   Additional items Verbal cues   Additional Comments no AD/DME used   Functional Mobility   Functional Mobility 5  Supervision   Additional Comments Pt took 1 step forward/backward in room w/ S; no AD/DME used; not agreeable to further household distances at this time   Balance   Static Sitting Good   Dynamic Sitting Fair +   Static Standing Fair   Dynamic Standing Fair   Ambulatory Fair   Activity Tolerance   Activity Tolerance Patient tolerated treatment well;Treatment limited secondary to agitation  (initially refused therapy at 800 AM; returned for evaluation at 922 AM)   Medical Staff Made Aware PT   Nurse Made Aware yes, Marisa   RUE Assessment   RUE Assessment WFL   LUE Assessment   LUE Assessment WFL   Hand Function   Gross Motor Coordination Functional   Fine Motor Coordination Functional   Psychosocial   Psychosocial (WDL) X   Patient Behaviors/Mood Irritable   Cognition   Overall Cognitive Status WFL   Arousal/Participation Responsive   Attention Within functional limits   Orientation Level Oriented X4   Memory Decreased recall of precautions   Following Commands Follows one step commands without difficulty   Comments Pt initially refused therapy evaluation; agreeable an hour later  Overall is irritable; reports no concerns regarding D/C home; does not want continued therapy while in the hospital  Will have son at home upon D/C to assist pt as needed   Assessment   Limitation Decreased endurance;Decreased high-level ADLs   Prognosis Fair   Assessment Pt is a 77 y/o male seen for OT eval s/p adm to [facility] w/ respiratory distress  Pt is dx'd w/ acute on chronic respiratory failure w/ hypoxia and hypercapnia   Pt  has a past medical history of Abdominal pain, MARANDA (acute kidney injury) ([facility] [address] ) (6/19/2018), Cardiac disease, CHF (congestive heart failure) ([facility] [address] ), COPD, severe ([facility] [address] ), Coronary artery disease, DDD (degenerative disc disease), lumbar (9/1/2020), Diabetes mellitus ([facility] ), Dyspnea, GERD (gastroesophageal reflux disease), Hyperlipidemia, Hypertension, MI (myocardial infarction) ([facility] ), Nodule of apex of right lung, JACQUELINE (obstructive sleep apnea), and Prostate cancer ([facility] )  Pt with active OT orders and up with assistance  orders  Pt lives with his son in 2 SH, 0 VINI, 1st floor setup  Pt was I w/  ADLS and IADLS, drove, & required use of DME PTA including RW, SPC, w/c and Home O2  Pt is not currently demonstrating any significant deficits in occupational performance at this time  Overall, pt is functioning at a S level for all functional tasks w/o use of AD/DME  Pt reports no concerns regarding D/C home w/ family support once medically stable  Pt does not want continued therapy while in the hospital  Recommend continued engagement in ADLS/functional mobility w/ nursing to maximize I prior to D/C  Pt presents w/ no further immediate acute skilled OT needs; recommend pt D/C home w/ continued Home OT, once medically stable  Will D/C OT at this time  Goals   Patient Goals to be left alone   Recommendation   OT Discharge Recommendation Home with home health rehabilitation   Additional Comments  The patient's raw score on the AM-PAC Daily Activity inpatient short form is 24, standardized score is 57 54, greater than 39 4  Patients at this level are likely to benefit from discharge to home  Please refer to the recommendation of the Occupational Therapist for safe discharge planning  Additional Comments 2 Pt seen as a co-evaluation due to the patient's co-morbidities, clinically unstable presentation, and present impairments which are a regression from the patient's baseline     -[facility] Daily Activity Inpatient   Lower Body Dressing 4   Bathing 4   Toileting 4   Upper Body Dressing 4   Grooming 4   Eating 4   Daily Activity Raw Score 24   Daily Activity Standardized Score (Calc for Raw Score >=11) 57 54   AM-PAC Applied Cognition Inpatient   Following a Speech/Presentation 4   Understanding Ordinary Conversation 4   Taking Medications 4   Remembering Where Things Are Placed or Put Away 4   Remembering List of 4-5 Errands 4   Taking Care of Complicated Tasks 4   Applied Cognition Raw Score 24   Applied Cognition Standardized Score 62 21   End of Consult   Education Provided Yes   Patient Position at End of Consult Seated edge of bed; All needs within reach   Nurse Communication Nurse aware of consult       Aura Thomas MS, OTR/L

## 2022-10-31 LAB
ANION GAP SERPL CALCULATED.3IONS-SCNC: 2 MMOL/L (ref 4–13)
BACTERIA BLD CULT: NORMAL
BACTERIA BLD CULT: NORMAL
BASOPHILS # BLD AUTO: 0.01 THOUSANDS/ÂΜL (ref 0–0.1)
BASOPHILS NFR BLD AUTO: 0 % (ref 0–1)
BUN SERPL-MCNC: 43 MG/DL (ref 5–25)
CALCIUM SERPL-MCNC: 8.1 MG/DL (ref 8.3–10.1)
CHLORIDE SERPL-SCNC: 106 MMOL/L (ref 96–108)
CO2 SERPL-SCNC: 34 MMOL/L (ref 21–32)
CREAT SERPL-MCNC: 1.91 MG/DL (ref 0.6–1.3)
EOSINOPHIL # BLD AUTO: 0.12 THOUSAND/ÂΜL (ref 0–0.61)
EOSINOPHIL NFR BLD AUTO: 3 % (ref 0–6)
ERYTHROCYTE [DISTWIDTH] IN BLOOD BY AUTOMATED COUNT: 15.2 % (ref 11.6–15.1)
GFR SERPL CREATININE-BSD FRML MDRD: 33 ML/MIN/1.73SQ M
GLUCOSE SERPL-MCNC: 176 MG/DL (ref 65–140)
GLUCOSE SERPL-MCNC: 180 MG/DL (ref 65–140)
GLUCOSE SERPL-MCNC: 224 MG/DL (ref 65–140)
GLUCOSE SERPL-MCNC: 269 MG/DL (ref 65–140)
GLUCOSE SERPL-MCNC: 288 MG/DL (ref 65–140)
GLUCOSE SERPL-MCNC: 54 MG/DL (ref 65–140)
GLUCOSE SERPL-MCNC: 54 MG/DL (ref 65–140)
HCT VFR BLD AUTO: 24.2 % (ref 36.5–49.3)
HGB BLD-MCNC: 7.6 G/DL (ref 12–17)
IMM GRANULOCYTES # BLD AUTO: 0.02 THOUSAND/UL (ref 0–0.2)
IMM GRANULOCYTES NFR BLD AUTO: 1 % (ref 0–2)
LYMPHOCYTES # BLD AUTO: 0.66 THOUSANDS/ÂΜL (ref 0.6–4.47)
LYMPHOCYTES NFR BLD AUTO: 16 % (ref 14–44)
MAGNESIUM SERPL-MCNC: 2.2 MG/DL (ref 1.6–2.6)
MCH RBC QN AUTO: 27.6 PG (ref 26.8–34.3)
MCHC RBC AUTO-ENTMCNC: 31.4 G/DL (ref 31.4–37.4)
MCV RBC AUTO: 88 FL (ref 82–98)
MONOCYTES # BLD AUTO: 0.43 THOUSAND/ÂΜL (ref 0.17–1.22)
MONOCYTES NFR BLD AUTO: 10 % (ref 4–12)
NEUTROPHILS # BLD AUTO: 2.96 THOUSANDS/ÂΜL (ref 1.85–7.62)
NEUTS SEG NFR BLD AUTO: 70 % (ref 43–75)
NRBC BLD AUTO-RTO: 0 /100 WBCS
PHOSPHATE SERPL-MCNC: 3.8 MG/DL (ref 2.3–4.1)
PLATELET # BLD AUTO: 98 THOUSANDS/UL (ref 149–390)
PMV BLD AUTO: 10.3 FL (ref 8.9–12.7)
POTASSIUM SERPL-SCNC: 4.2 MMOL/L (ref 3.5–5.3)
PROCALCITONIN SERPL-MCNC: 0.14 NG/ML
RBC # BLD AUTO: 2.75 MILLION/UL (ref 3.88–5.62)
SODIUM SERPL-SCNC: 142 MMOL/L (ref 135–147)
WBC # BLD AUTO: 4.2 THOUSAND/UL (ref 4.31–10.16)

## 2022-10-31 RX ORDER — FUROSEMIDE 40 MG/1
40 TABLET ORAL DAILY
Status: DISCONTINUED | OUTPATIENT
Start: 2022-10-31 | End: 2022-11-02 | Stop reason: HOSPADM

## 2022-10-31 RX ADMIN — BUDESONIDE 0.5 MG: 0.5 INHALANT ORAL at 07:55

## 2022-10-31 RX ADMIN — CEFTRIAXONE SODIUM 1000 MG: 10 INJECTION, POWDER, FOR SOLUTION INTRAVENOUS at 17:20

## 2022-10-31 RX ADMIN — ISOSORBIDE MONONITRATE 30 MG: 30 TABLET, EXTENDED RELEASE ORAL at 09:28

## 2022-10-31 RX ADMIN — LEVALBUTEROL HYDROCHLORIDE 1.25 MG: 1.25 SOLUTION, CONCENTRATE RESPIRATORY (INHALATION) at 07:54

## 2022-10-31 RX ADMIN — FORMOTEROL FUMARATE DIHYDRATE 20 MCG: 20 SOLUTION RESPIRATORY (INHALATION) at 07:58

## 2022-10-31 RX ADMIN — FUROSEMIDE 40 MG: 40 TABLET ORAL at 10:41

## 2022-10-31 RX ADMIN — LEVALBUTEROL HYDROCHLORIDE 1.25 MG: 1.25 SOLUTION, CONCENTRATE RESPIRATORY (INHALATION) at 14:38

## 2022-10-31 RX ADMIN — ASPIRIN 81 MG: 81 TABLET, COATED ORAL at 09:28

## 2022-10-31 RX ADMIN — INSULIN LISPRO 8 UNITS: 100 INJECTION, SOLUTION INTRAVENOUS; SUBCUTANEOUS at 12:09

## 2022-10-31 RX ADMIN — INSULIN LISPRO 8 UNITS: 100 INJECTION, SOLUTION INTRAVENOUS; SUBCUTANEOUS at 09:29

## 2022-10-31 RX ADMIN — CHLORHEXIDINE GLUCONATE 15 ML: 1.2 SOLUTION ORAL at 09:28

## 2022-10-31 RX ADMIN — PANTOPRAZOLE SODIUM 40 MG: 40 TABLET, DELAYED RELEASE ORAL at 17:19

## 2022-10-31 RX ADMIN — INSULIN LISPRO 2 UNITS: 100 INJECTION, SOLUTION INTRAVENOUS; SUBCUTANEOUS at 09:28

## 2022-10-31 RX ADMIN — INSULIN LISPRO 2 UNITS: 100 INJECTION, SOLUTION INTRAVENOUS; SUBCUTANEOUS at 12:08

## 2022-10-31 RX ADMIN — PANTOPRAZOLE SODIUM 40 MG: 40 TABLET, DELAYED RELEASE ORAL at 06:02

## 2022-10-31 RX ADMIN — IPRATROPIUM BROMIDE 0.5 MG: 0.5 SOLUTION RESPIRATORY (INHALATION) at 14:38

## 2022-10-31 RX ADMIN — INSULIN GLARGINE 20 UNITS: 100 INJECTION, SOLUTION SUBCUTANEOUS at 21:33

## 2022-10-31 RX ADMIN — CARVEDILOL 6.25 MG: 6.25 TABLET, FILM COATED ORAL at 09:31

## 2022-10-31 RX ADMIN — TAMSULOSIN HYDROCHLORIDE 0.4 MG: 0.4 CAPSULE ORAL at 17:19

## 2022-10-31 RX ADMIN — APIXABAN 5 MG: 5 TABLET, FILM COATED ORAL at 09:28

## 2022-10-31 RX ADMIN — APIXABAN 5 MG: 5 TABLET, FILM COATED ORAL at 17:19

## 2022-10-31 RX ADMIN — IPRATROPIUM BROMIDE 0.5 MG: 0.5 SOLUTION RESPIRATORY (INHALATION) at 07:54

## 2022-10-31 RX ADMIN — CARVEDILOL 6.25 MG: 6.25 TABLET, FILM COATED ORAL at 17:19

## 2022-10-31 NOTE — ASSESSMENT & PLAN NOTE
On presentation, patient was found to be meeting SIRS (tachycardia and tachypnea)  · Sepsis w/u was initiated     · UA showed innumerable WBC--urine cx + however difficult to ascertain if symptoms  · Blood cultures negative  · Was initially on broad-spectrum antibiotics, transitioned to IV ceftriaxone on 10/28 with plan to complete 5 days antibiotics through tomorrow

## 2022-10-31 NOTE — ASSESSMENT & PLAN NOTE
Lab Results   Component Value Date    HGBA1C 9 7 (H) 10/10/2022       Recent Labs     10/30/22  1158 10/30/22  1639 10/30/22  2121 10/31/22  0711   POCGLU 122 104 239* 176*       A1c 9 7  · On most recent hospital discharge (10/24/22) was discharged on 40U lantus and Humalog 15U TID per Endocrinology recommendations  · On presentation BG elevated to 500s and was started on insulin drip  · Currently on Lantus 20 units HS Humalog 8 units t i d  with meals  · Titrate p r n

## 2022-10-31 NOTE — RESPIRATORY THERAPY NOTE
resp   10/31/22 1439   Inhalation Therapy Tx   $ Inhalation Therapy Performed Yes   $ Pulse Oximetry Spot Check Charge Completed   SpO2 97 %   Pre-Treatment Pulse 69   Pre-Treatment Respirations 22   Duration 10   Breath Sounds Pre-Treatment Bilateral Diminished   Breath Sounds Post-Treatment Bilateral Diminished   Post-Treatment Pulse 70   Post-Treatment Respirations 22   Delivery Source Oxygen;UDN   Position Sitting   Treatment Tolerance Tolerated well   Resp Comments Pt refused home O2 eval  Pt stated they can not walk and do not want to go home  Notified nursing and ordering provider

## 2022-10-31 NOTE — ASSESSMENT & PLAN NOTE
Wt Readings from Last 3 Encounters:   10/31/22 93 3 kg (205 lb 11 oz)   10/23/22 95 8 kg (211 lb 3 2 oz)   10/15/22 98 7 kg (217 lb 11 1 oz)     Last echo on 05/2022 showed EF 60%; Moderately dilated RV, Mild AV and TV regurg  · At home is on Lasix 40 mg daily, Imdur 30 mg daily, and Coreg 6 25 mg BID  · On presentation, found to be volume overloaded   · S/p several doses of IV lasix--will transition to home dose p o   Lasix

## 2022-10-31 NOTE — PROGRESS NOTES
Progress Note- Alisha Anaya Sr  78 y o  male MRN: [id number]    Unit/Bed#: [facility] 124-04 Encounter: [id number]      Assessment and Plan:  29-year-old man with multiple comorbidities including severe COPD, AFib on Eliquis, lung cancer status post SBRT, chronic diastolic CHF, diabetes mellitus, GERD who was admitted to the hospital for respiratory distress requiring intubation  He was recently discharged from from the hospital during which time he he was treated for a GI bleed secondary to duodenal ulcer      Acute on chronic anemia  Duodenal ulcer  AFib on Eliquis     Patient was recently admitted in the hospital and underwent EGD on 10/20 which showed Pierce IIA ulcer in the duodenal sweep this was treated with APC and epinephrine  This was a very difficult procedure given difficult location of the ulcer therefore hemo spray was used      · While in the ICU he had a hemoglobin drop which responded appropriately to blood transfusion  · Eliquis started on Saturday, during his hospital admission he has had no episodes of melena while on Eliquis and states currently he is having brown stool  · Recommend p o  PPI b i d  for 8-12 weeks  · Patient takes ibuprofen (2 tablets daily) at home, recommend switching to other antipyretic  · Will consider getting H pylori stool antigen in the outpatient setting    Disposition:  Patient okay to be discharged from GI standpoint    We will arrange for follow-up to be seen in GI clinic     Patient discussed with Dr Annemarie Rojas MD   Gastroenterology Fellow        ______________________________________________________________________    Subjective:     Patient denies any abdominal pain  States that he is currently having brown stool      Medication Administration - last 24 hours from 10/30/2022 1359 to 10/31/2022 1359       Date/Time Order Dose Route Action Action by     10/31/2022 0928 chlorhexidine (PERIDEX) 0 12 % oral rinse 15 mL 15 mL Mouth/Throat Given Maria E Henry Indra Leyva, RN     10/30/2022 2146 chlorhexidine (PERIDEX) 0 12 % oral rinse 15 mL 15 mL Mouth/Throat Not Given Bobby Forbes, BEATRICE     10/31/2022 0755 budesonide (PULMICORT) inhalation solution 0 5 mg 0 5 mg Nebulization Given Heather James, RT     10/30/2022 2034 budesonide (PULMICORT) inhalation solution 0 5 mg 0 5 mg Nebulization Given [place], RT     10/31/2022 0758 formoterol (PERFOROMIST) nebulizer solution 20 mcg 20 mcg Nebulization Given Heathergaudencio James, RT     10/30/2022 2034 formoterol (PERFOROMIST) nebulizer solution 20 mcg 20 mcg Nebulization Given [place], RT     10/31/2022 0754 levalbuterol (XOPENEX) inhalation solution 1 25 mg 1 25 mg Nebulization Given Heathergaudencio James, RT     10/30/2022 2034 levalbuterol (XOPENEX) inhalation solution 1 25 mg 1 25 mg Nebulization Given [place], RT     10/30/2022 1400 levalbuterol (XOPENEX) inhalation solution 1 25 mg   Nebulization Canceled Entry Lyndsey Umana, RT     10/31/2022 0754 ipratropium (ATROVENT) 0 02 % inhalation solution 0 5 mg 0 5 mg Nebulization Given Heathergaudencio James, RT     10/30/2022 2034 ipratropium (ATROVENT) 0 02 % inhalation solution 0 5 mg 0 5 mg Nebulization Given [place] Edgar, RT     10/31/2022 0931 carvedilol (COREG) tablet 6 25 mg 6 25 mg Oral Given Joel Grewal RN     10/30/2022 1744 carvedilol (COREG) tablet 6 25 mg 6 25 mg Oral Given Bobby Forbes RN     10/31/2022 3388 isosorbide mononitrate (IMDUR) 24 hr tablet 30 mg 30 mg Oral Given Joel Grewal RN     10/30/2022 2146 insulin glargine (LANTUS) subcutaneous injection 20 Units 0 2 mL 20 Units Subcutaneous Given Bobby Forbes RN     10/31/2022 1208 insulin lispro (HumaLOG) 100 units/mL subcutaneous injection 2-12 Units 2 Units Subcutaneous Given Ian Whiting RN     10/31/2022 0928 insulin lispro (HumaLOG) 100 units/mL subcutaneous injection 2-12 Units 2 Units Subcutaneous Given Joel Grewal RN     10/30/2022 1745 insulin lispro (HumaLOG) 100 units/mL subcutaneous injection 2-12 Units 2 Units Subcutaneous Not Given Timmy Baumann RN     10/30/2022 1800 cefTRIAXone (ROCEPHIN) 1,000 mg in dextrose 5 % 50 mL IVPB 1,000 mg Intravenous New Bag Timmy Baumann RN     10/31/2022 1209 insulin lispro (HumaLOG) 100 units/mL subcutaneous injection 8 Units 8 Units Subcutaneous Given Bin Gama RN     10/31/2022 7241 insulin lispro (HumaLOG) 100 units/mL subcutaneous injection 8 Units 8 Units Subcutaneous Given Barber Worthington RN     10/30/2022 1747 insulin lispro (HumaLOG) 100 units/mL subcutaneous injection 8 Units 8 Units Subcutaneous Given Timmy Baumann RN     10/31/2022 4278 aspirin (ECOTRIN LOW STRENGTH) EC tablet 81 mg 81 mg Oral Given Barber Worthington RN     10/31/2022 9148 apixaban (ELIQUIS) tablet 5 mg 5 mg Oral Given Barber Worthington RN     10/30/2022 1744 apixaban (ELIQUIS) tablet 5 mg 5 mg Oral Given Timmy Baumann RN     10/31/2022 0602 pantoprazole (PROTONIX) EC tablet 40 mg 40 mg Oral Given Barber Worthington RN     10/30/2022 1744 pantoprazole (PROTONIX) EC tablet 40 mg 40 mg Oral Given Timmy Baumann RN     10/30/2022 1744 tamsulosin (FLOMAX) capsule 0 4 mg 0 4 mg Oral Given Timmy Baumann RN     10/31/2022 1041 furosemide (LASIX) tablet 40 mg 40 mg Oral Given Barber Worthington RN          Objective:     Vitals: Blood pressure 169/70, pulse 69, temperature 98 1 °F (36 7 °C), resp  rate 20, weight 93 3 kg (205 lb 11 oz), SpO2 100 %  ,Body mass index is 27 9 kg/m²        Intake/Output Summary (Last 24 hours) at 10/31/2022 1359  Last data filed at 10/31/2022 1224  Gross per 24 hour   Intake 960 ml   Output 2145 ml   Net -1185 ml       Physical Exam:   General Appearance: Awake and alert, in no acute distress  Abdomen: Soft, non-tender, non-distended; bowel sounds normal; no masses or no organomegaly    Invasive Devices  Report    Peripheral Intravenous Line  Duration           Peripheral IV 10/30/22 Right;Ventral (anterior) Forearm 1 day Lab Results:  No results displayed because visit has over 200 results  Imaging Studies: I have personally reviewed pertinent imaging studies

## 2022-10-31 NOTE — PLAN OF CARE
Problem: PAIN - ADULT  Goal: Verbalizes/displays adequate comfort level or baseline comfort level  Description: Interventions:  - Encourage patient to monitor pain and request assistance  - Assess pain using appropriate pain scale  - Administer analgesics based on type and severity of pain and evaluate response  - Implement non-pharmacological measures as appropriate and evaluate response  - Consider cultural and social influences on pain and pain management  - Notify physician/advanced practitioner if interventions unsuccessful or patient reports new pain  Outcome: Progressing     Problem: INFECTION - ADULT  Goal: Absence or prevention of progression during hospitalization  Description: INTERVENTIONS:  - Assess and monitor for signs and symptoms of infection  - Monitor lab/diagnostic results  - Monitor all insertion sites, i e  indwelling lines, tubes, and drains  - Monitor endotracheal if appropriate and nasal secretions for changes in amount and color  - [place] appropriate cooling/warming therapies per order  - Administer medications as ordered  - Instruct and encourage patient and family to use good hand hygiene technique  - Identify and instruct in appropriate isolation precautions for identified infection/condition  Outcome: Progressing  Goal: Absence of fever/infection during neutropenic period  Description: INTERVENTIONS:  - Monitor WBC    Outcome: Progressing

## 2022-10-31 NOTE — OCCUPATIONAL THERAPY NOTE
Occupational Therapy SCREEN        Patient Name: Claudette Knapp  Today's Date: 10/31/2022         10/31/22 1020   OT Last Visit   OT Visit Date 10/31/22   Note Type   Note type Screen   Cancel Reasons Refusal   Additional Comments NEW OT ORDERS PLACED FOR COG EVALUATION  PT SEEN YESTERDAY FOR OT EVAL AND DEEMED "WFL" FOR COGNITION DESPITE BEING IRRITABLE  PER INITIAL EVAL, PT FROM HOME WITH SON WHO IS ABLE TO ASSIST AS NEEDED  INITIAL EVAL ALSO REPORTS PT REFUSING FURTHER PARTICIPATION IN THERAPY SERVICES DURING THIS ADMISSION  WILL SIGN OFF  CONT HOME WITH INCREASED SUPPORT + HOME OT SERVICES           Jaci Aparicio, MOT, OTR/L
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
## 2022-10-31 NOTE — PROGRESS NOTES
[address] [facility]  Progress Note - Brenda Hernández Sr  1945, 78 y o  male MRN: [id number]  Unit/Bed#: [facility] 664-33 Encounter: [id number]  Primary Care Provider: Malinda Rivera MD   Date and time admitted to hospital: 10/26/2022 12:03 PM    * Acute on chronic respiratory failure with hypoxia and hypercapnia ([facility] [address] )  Assessment & Plan  Hx of COPD (on 2L at baseline), HFpEF, Lung adenocarcinoma, and recurrent right pleural effusions was found to be hypoxic on presentation  Initially, trialed on 6L O2 but did not improve and became more encephalopathic  Was intubated and admitted to ICU  Thought to be 2/2 CHF vs COPD exacerbation  Given IV lasix 40 mg x1 in ED with good urine output overnight  His oxygenation status improved and he was extubated  · Transferred out of ICU on 10/29  · Refusing to wear oxygen at times, education provided      · Home oxygen assessment, patient reports "you all keep changing when and how much oxygen I should wear"  · See chronic diastolic heart failure and COPD Assessment & Plans below    Acute metabolic encephalopathy  Assessment & Plan  On presentation, was found to be encephalopathic likely 2/2 hypercapnia from respiratory failure  · Now resolved after being intubated and diuresed  · Monitor mental status     COPD (chronic obstructive pulmonary disease) (HCC)  Assessment & Plan  Hx of COPD with last PFTs showing FEV1/FVC 41%; FEV1 33%  · On 2L O2 at home  · On Incruse and Breo Ellipta at home  · Admitted for acute on chronic hypoxic hypercapnic respiratory failure thought to have component of COPD exacerbation  · Currently on Xopenex 1 25 mg TID, Atrovent 0 5 mg TID, Perforomist 20 mcg q12 and Pulmicort 0 5 mg q12  · Per ICU/pulmonology transfer note, monitor off systemic steroids for now, recently completed steroid course several days ago for COPD exacerbation    SIRS (systemic inflammatory response syndrome) ([facility] )  Assessment & Plan  On presentation, patient was found to be meeting SIRS (tachycardia and tachypnea)  · Sepsis w/u was initiated  · UA showed innumerable WBC--urine cx + however difficult to ascertain if symptoms  · Blood cultures negative  · Was initially on broad-spectrum antibiotics, transitioned to IV ceftriaxone on 10/28 with plan to complete 5 days antibiotics through tomorrow    Chronic diastolic heart failure [facility])  Assessment & Plan  Wt Readings from Last 3 Encounters:   10/31/22 93 3 kg (205 lb 11 oz)   10/23/22 95 8 kg (211 lb 3 2 oz)   10/15/22 98 7 kg (217 lb 11 1 oz)     Last echo on 05/2022 showed EF 60%; Moderately dilated RV, Mild AV and TV regurg  · At home is on Lasix 40 mg daily, Imdur 30 mg daily, and Coreg 6 25 mg BID  · On presentation, found to be volume overloaded   · S/p several doses of IV lasix--will transition to home dose p o  Lasix          A-fib (HCC)  Assessment & Plan  On Eliquis 5 mg BID and Coreg 6 25 mg at home  · Continue home Eliquis and Coreg    Pleural effusion on right  Assessment & Plan  Hx of lung adenocarcinoma s/p SBRT complicated by recurrent right pleural effusions likely 2/2 effects of radiation  · On most recent tap (04/2021) fluid found to be lymphocytic and exudative  · CXR on 10/26 showed chronic right-sided pleural effusion with mid-line shift  · Monitor for need for thoracentesis    Type 2 diabetes mellitus with hyperglycemia, with long-term current use of insulin [facility])  Assessment & Plan  Lab Results   Component Value Date    HGBA1C 9 7 (H) 10/10/2022       Recent Labs     10/30/22  1158 10/30/22  1639 10/30/22  2121 10/31/22  0711   POCGLU 122 104 239* 176*       A1c 9 7  · On most recent hospital discharge (10/24/22) was discharged on 40U lantus and Humalog 15U TID per Endocrinology recommendations  · On presentation BG elevated to 500s and was started on insulin drip  · Currently on Lantus 20 units HS Humalog 8 units t i d  with meals  · Titrate p r n      Acute-on-chronic kidney injury [facility])  Assessment & Plan  Lab Results   Component Value Date    EGFR 33 10/31/2022    EGFR 27 10/30/2022    EGFR 25 10/29/2022    CREATININE 1 91 (H) 10/31/2022    CREATININE 2 23 (H) 10/30/2022    CREATININE 2 39 (H) 10/29/2022     Previous baseline Cr 1 4-1 7 however most recent baseline creatinine documented around 2 6-2 8 per renal notes from recent admission last week   · Likely CKD progression  · Creat with improvement with diuresis in ICU  · Resume home dose p o  Lasix 40 mg daily  · Trend BMP  · Avoid nephrotoxins or hypotension    Essential hypertension  Assessment & Plan  On Coreg 6 25 BID and Imdur 30 mg daily PTA  · Continue, monitor    CAD (coronary artery disease)  Assessment & Plan  Has hx of MI s/p PCI  Of note had negative stress test May  · Continue asa/bb/statin/imdur    Acute on chronic anemia  Assessment & Plan  Found to have hemoglobin dropped to 6 3 on 10/28  Was given 1 unit packed red blood cells with improvement to 8 1  Hemoglobin has thereafter remained stable  · Given recent history of PUD and reported melanotic stool at home, GI was consulted  · No current plan for EGD/colonoscopy  · Recent EGD 10/20 with duodenal ulcer   · Aspirin and Eliquis were restarted  · Continue PPI  · Continue to monitor CBC  · Transfuse for hemoglobin below 7    History of prostate cancer  Assessment & Plan  hx of Prostate cancer s/p brachytherapy and EBRT  · Continue Flomax 0 4 mg     HLD (hyperlipidemia)  Assessment & Plan  On Simvastatin 40 mg at home--we do not carry this, patient with allergy" to alternative  · Resume home statin at discharge    Adenocarcinoma of lung ([facility] [address] )  Assessment & Plan  Hx of Stage 1A2 lung adenocarcinoma s/p SBRT  · Follows with Pulmonology outpatient       VTE Pharmacologic Prophylaxis: VTE Score: 9 Moderate Risk (Score 3-4) - Pharmacological DVT Prophylaxis Ordered: apixaban (Eliquis)  Patient Centered Rounds: I performed bedside rounds with nursing staff today     Discussions with Specialists or Other Care Team Provider: nursing, case management     Education and Discussions with Family / Patient: Attempted to update  (son) via phone  Unable to contact  Time Spent for Care: 30 minutes  More than 50% of total time spent on counseling and coordination of care as described above  Current Length of Stay: 5 day(s)  Current Patient Status: Inpatient   Certification Statement: The patient will continue to require additional inpatient hospital stay due to monitoring volume status, renal function  Discharge Plan: Anticipate discharge in 24-48 hrs to home with home services  Code Status: Level 1 - Full Code    Subjective:   Patient offers no acute complaints  Attempted as can about his home environment, patient was bit defensive  States that he takes all his medications as prescribed but is unable to tell me what he takes  States that he was only using his oxygen “as needed” based on symptoms, not pulse ox  Reports feeling frustrated as he feels that every time he comes to the hospital, he is told to do different things with his oxygen  Objective:     Vitals:   Temp (24hrs), Av 1 °F (36 7 °C), Min:98 1 °F (36 7 °C), Max:98 1 °F (36 7 °C)    Temp:  [98 1 °F (36 7 °C)] 98 1 °F (36 7 °C)  HR:  [69] 69  Resp:  [20] 20  BP: (169)/(70) 169/70  SpO2:  [96 %-100 %] 100 %  Body mass index is 27 9 kg/m²  Input and Output Summary (last 24 hours): Intake/Output Summary (Last 24 hours) at 10/31/2022 1000  Last data filed at 10/31/2022 0901  Gross per 24 hour   Intake 960 ml   Output 2345 ml   Net -1385 ml       Physical Exam:   Physical Exam  Vitals and nursing note reviewed  Constitutional:       Interventions: Nasal cannula in place  Cardiovascular:      Rate and Rhythm: Normal rate  Rhythm irregular  Pulmonary:      Breath sounds: Decreased breath sounds present  Abdominal:      Tenderness: There is no abdominal tenderness  Musculoskeletal:         General: Swelling present  Skin:     General: Skin is warm  Neurological:      Mental Status: He is alert and oriented to person, place, and time  Mental status is at baseline  Psychiatric:         Mood and Affect: Affect is flat and angry  Additional Data:     Labs:  Results from last 7 days   Lab Units 10/31/22  0608   WBC Thousand/uL 4 20*   HEMOGLOBIN g/dL 7 6*   HEMATOCRIT % 24 2*   PLATELETS Thousands/uL 98*   NEUTROS PCT % 70   LYMPHS PCT % 16   MONOS PCT % 10   EOS PCT % 3     Results from last 7 days   Lab Units 10/31/22  0608 10/28/22  0432 10/27/22  0431   SODIUM mmol/L 142   < > 140   POTASSIUM mmol/L 4 2   < > 5 1   CHLORIDE mmol/L 106   < > 106   CO2 mmol/L 34*   < > 34*   BUN mg/dL 43*   < > 51*   CREATININE mg/dL 1 91*   < > 2 33*   ANION GAP mmol/L 2*   < > 0*   CALCIUM mg/dL 8 1*   < > 8 8   ALBUMIN g/dL  --   --  2 2*   TOTAL BILIRUBIN mg/dL  --   --  0 23   ALK PHOS U/L  --   --  77   ALT U/L  --   --  23   AST U/L  --   --  10   GLUCOSE RANDOM mg/dL 180*   < > 142*    < > = values in this interval not displayed  Results from last 7 days   Lab Units 10/31/22  0711 10/30/22  2121 10/30/22  1639 10/30/22  1158 10/30/22  0756 10/29/22  2133 10/29/22  1734 10/29/22  1552 10/29/22  1110 10/29/22  0721 10/28/22  1721 10/28/22  1657   POC GLUCOSE mg/dl 176* 239* 104 122 242* 112 161* 193* 159* 179* 155* 136         Results from last 7 days   Lab Units 10/31/22  0608 10/26/22  1717 10/26/22  1409 10/26/22  1404   LACTIC ACID mmol/L  --  1 3  --  3 5*   PROCALCITONIN ng/ml 0 14  --  0 21  --        Lines/Drains:  Invasive Devices  Report    Peripheral Intravenous Line  Duration           Peripheral IV 10/30/22 Right;Ventral (anterior) Forearm <1 day                      Imaging: No pertinent imaging reviewed      Recent Cultures (last 7 days):   Results from last 7 days   Lab Units 10/26/22  1533 10/26/22  1532 10/26/22  1528 10/26/22  1527   BLOOD CULTURE --   --  No Growth After 4 Days  No Growth After 4 Days  --    SPUTUM CULTURE   --   --   --  Few Colonies of Pseudomonas aeruginosa*  1+ Growth of    GRAM STAIN RESULT   --   --   --  No Polys or Bacteria seen   URINE CULTURE  >100,000 cfu/ml Klebsiella variicola*  --   --   --    LEGIONELLA URINARY ANTIGEN   --  Negative  --   --        Last 24 Hours Medication List:   Current Facility-Administered Medications   Medication Dose Route Frequency Provider Last Rate   • acetaminophen  975 mg Oral Q6H PRN Saúl Moulton MD     • apixaban  5 mg Oral BID Saúl Moulton MD     • aspirin  81 mg Oral Daily Saúl Moulton MD     • budesonide  0 5 mg Nebulization Q12H Saúl Moulton MD     • carvedilol  6 25 mg Oral BID With Meals Saúl Moulton MD     • cefTRIAXone  1,000 mg Intravenous Q24H Saúl Moulton MD 1,000 mg (10/30/22 1800)   • chlorhexidine  15 mL Mouth/Throat Q12H [facility] Phoebe Sauceda MD     • fentanyl citrate (PF)  100 mcg Intravenous Q2H PRN Saúl Moulton MD     • formoterol  20 mcg Nebulization Q12H Saúl Moulton MD     • furosemide  40 mg Oral Daily YOLANDE Fiore     • insulin glargine  20 Units Subcutaneous HS Saúl Moulton MD     • insulin lispro  2-12 Units Subcutaneous TID  Phoebe Sauceda MD     • insulin lispro  8 Units Subcutaneous TID With Meals Saúl Moulton MD     • ipratropium  0 5 mg Nebulization TID Saúl Moulton MD     • isosorbide mononitrate  30 mg Oral Daily Saúl Moulton MD     • levalbuterol  1 25 mg Nebulization TID Saúl Moulton MD     • Lidocaine Viscous HCl  15 mL Swish & Swallow 4x Daily PRN Saúl Moulton MD     • pantoprazole  40 mg Oral BID AC Saúl Moulton MD     • tamsulosin  0 4 mg Oral Daily With Dinner Alina Rivera PA-C          Today, Patient Was Seen By: Cristiana Cohen    **Please Note: This note may have been constructed using a voice recognition system  **

## 2022-10-31 NOTE — ASSESSMENT & PLAN NOTE
Lab Results   Component Value Date    EGFR 33 10/31/2022    EGFR 27 10/30/2022    EGFR 25 10/29/2022    CREATININE 1 91 (H) 10/31/2022    CREATININE 2 23 (H) 10/30/2022    CREATININE 2 39 (H) 10/29/2022     Previous baseline Cr 1 4-1 7 however most recent baseline creatinine documented around 2 6-2 8 per renal notes from recent admission last week   · Likely CKD progression  · Creat with improvement with diuresis in ICU  · Resume home dose p o   Lasix 40 mg daily  · Trend BMP  · Avoid nephrotoxins or hypotension

## 2022-10-31 NOTE — ASSESSMENT & PLAN NOTE
Hx of COPD with last PFTs showing FEV1/FVC 41%; FEV1 33%  · On 2L O2 at home  · On Incruse and Bernerd Dice at home  · Admitted for acute on chronic hypoxic hypercapnic respiratory failure thought to have component of COPD exacerbation  · Currently on Xopenex 1 25 mg TID, Atrovent 0 5 mg TID, Perforomist 20 mcg q12 and Pulmicort 0 5 mg q12  · Per ICU/pulmonology transfer note, monitor off systemic steroids for now, recently completed steroid course several days ago for COPD exacerbation

## 2022-10-31 NOTE — ASSESSMENT & PLAN NOTE
Hx of COPD (on 2L at baseline), HFpEF, Lung adenocarcinoma, and recurrent right pleural effusions was found to be hypoxic on presentation  Initially, trialed on 6L O2 but did not improve and became more encephalopathic  Was intubated and admitted to ICU  Thought to be 2/2 CHF vs COPD exacerbation  Given IV lasix 40 mg x1 in ED with good urine output overnight  His oxygenation status improved and he was extubated  · Transferred out of ICU on 10/29  · Refusing to wear oxygen at times, education provided      · Home oxygen assessment, patient reports "you all keep changing when and how much oxygen I should wear"  · See chronic diastolic heart failure and COPD Assessment & Plans below

## 2022-11-01 LAB
ANION GAP SERPL CALCULATED.3IONS-SCNC: 4 MMOL/L (ref 4–13)
BASOPHILS # BLD AUTO: 0.01 THOUSANDS/ÂΜL (ref 0–0.1)
BASOPHILS NFR BLD AUTO: 0 % (ref 0–1)
BUN SERPL-MCNC: 41 MG/DL (ref 5–25)
CALCIUM SERPL-MCNC: 8.2 MG/DL (ref 8.3–10.1)
CHLORIDE SERPL-SCNC: 102 MMOL/L (ref 96–108)
CO2 SERPL-SCNC: 33 MMOL/L (ref 21–32)
CREAT SERPL-MCNC: 1.96 MG/DL (ref 0.6–1.3)
EOSINOPHIL # BLD AUTO: 0.15 THOUSAND/ÂΜL (ref 0–0.61)
EOSINOPHIL NFR BLD AUTO: 3 % (ref 0–6)
ERYTHROCYTE [DISTWIDTH] IN BLOOD BY AUTOMATED COUNT: 15 % (ref 11.6–15.1)
GFR SERPL CREATININE-BSD FRML MDRD: 32 ML/MIN/1.73SQ M
GLUCOSE SERPL-MCNC: 177 MG/DL (ref 65–140)
GLUCOSE SERPL-MCNC: 177 MG/DL (ref 65–140)
GLUCOSE SERPL-MCNC: 204 MG/DL (ref 65–140)
GLUCOSE SERPL-MCNC: 213 MG/DL (ref 65–140)
GLUCOSE SERPL-MCNC: 290 MG/DL (ref 65–140)
GLUCOSE SERPL-MCNC: 340 MG/DL (ref 65–140)
HCT VFR BLD AUTO: 25.3 % (ref 36.5–49.3)
HGB BLD-MCNC: 8.1 G/DL (ref 12–17)
IMM GRANULOCYTES # BLD AUTO: 0.02 THOUSAND/UL (ref 0–0.2)
IMM GRANULOCYTES NFR BLD AUTO: 0 % (ref 0–2)
LYMPHOCYTES # BLD AUTO: 0.6 THOUSANDS/ÂΜL (ref 0.6–4.47)
LYMPHOCYTES NFR BLD AUTO: 13 % (ref 14–44)
MCH RBC QN AUTO: 28 PG (ref 26.8–34.3)
MCHC RBC AUTO-ENTMCNC: 32 G/DL (ref 31.4–37.4)
MCV RBC AUTO: 88 FL (ref 82–98)
MONOCYTES # BLD AUTO: 0.49 THOUSAND/ÂΜL (ref 0.17–1.22)
MONOCYTES NFR BLD AUTO: 10 % (ref 4–12)
NEUTROPHILS # BLD AUTO: 3.52 THOUSANDS/ÂΜL (ref 1.85–7.62)
NEUTS SEG NFR BLD AUTO: 74 % (ref 43–75)
NRBC BLD AUTO-RTO: 0 /100 WBCS
PLATELET # BLD AUTO: 92 THOUSANDS/UL (ref 149–390)
PMV BLD AUTO: 10.4 FL (ref 8.9–12.7)
POTASSIUM SERPL-SCNC: 4.2 MMOL/L (ref 3.5–5.3)
RBC # BLD AUTO: 2.89 MILLION/UL (ref 3.88–5.62)
SODIUM SERPL-SCNC: 139 MMOL/L (ref 135–147)
WBC # BLD AUTO: 4.79 THOUSAND/UL (ref 4.31–10.16)

## 2022-11-01 RX ORDER — INSULIN LISPRO 100 [IU]/ML
2-12 INJECTION, SOLUTION INTRAVENOUS; SUBCUTANEOUS
Status: DISCONTINUED | OUTPATIENT
Start: 2022-11-02 | End: 2022-11-01

## 2022-11-01 RX ORDER — INSULIN LISPRO 100 [IU]/ML
2-12 INJECTION, SOLUTION INTRAVENOUS; SUBCUTANEOUS
Status: DISCONTINUED | OUTPATIENT
Start: 2022-11-02 | End: 2022-11-02 | Stop reason: HOSPADM

## 2022-11-01 RX ADMIN — ISOSORBIDE MONONITRATE 30 MG: 30 TABLET, EXTENDED RELEASE ORAL at 09:53

## 2022-11-01 RX ADMIN — INSULIN LISPRO 8 UNITS: 100 INJECTION, SOLUTION INTRAVENOUS; SUBCUTANEOUS at 09:54

## 2022-11-01 RX ADMIN — APIXABAN 5 MG: 5 TABLET, FILM COATED ORAL at 17:18

## 2022-11-01 RX ADMIN — INSULIN LISPRO 2 UNITS: 100 INJECTION, SOLUTION INTRAVENOUS; SUBCUTANEOUS at 09:55

## 2022-11-01 RX ADMIN — BUDESONIDE 0.5 MG: 0.5 INHALANT ORAL at 19:22

## 2022-11-01 RX ADMIN — TAMSULOSIN HYDROCHLORIDE 0.4 MG: 0.4 CAPSULE ORAL at 17:18

## 2022-11-01 RX ADMIN — IPRATROPIUM BROMIDE 0.5 MG: 0.5 SOLUTION RESPIRATORY (INHALATION) at 07:26

## 2022-11-01 RX ADMIN — PANTOPRAZOLE SODIUM 40 MG: 40 TABLET, DELAYED RELEASE ORAL at 09:57

## 2022-11-01 RX ADMIN — INSULIN GLARGINE 20 UNITS: 100 INJECTION, SOLUTION SUBCUTANEOUS at 22:20

## 2022-11-01 RX ADMIN — LEVALBUTEROL HYDROCHLORIDE 1.25 MG: 1.25 SOLUTION, CONCENTRATE RESPIRATORY (INHALATION) at 07:26

## 2022-11-01 RX ADMIN — IPRATROPIUM BROMIDE 0.5 MG: 0.5 SOLUTION RESPIRATORY (INHALATION) at 13:41

## 2022-11-01 RX ADMIN — FUROSEMIDE 40 MG: 40 TABLET ORAL at 09:53

## 2022-11-01 RX ADMIN — CARVEDILOL 6.25 MG: 6.25 TABLET, FILM COATED ORAL at 17:18

## 2022-11-01 RX ADMIN — INSULIN LISPRO 4 UNITS: 100 INJECTION, SOLUTION INTRAVENOUS; SUBCUTANEOUS at 17:17

## 2022-11-01 RX ADMIN — LEVALBUTEROL HYDROCHLORIDE 1.25 MG: 1.25 SOLUTION, CONCENTRATE RESPIRATORY (INHALATION) at 13:41

## 2022-11-01 RX ADMIN — LEVALBUTEROL HYDROCHLORIDE 1.25 MG: 1.25 SOLUTION, CONCENTRATE RESPIRATORY (INHALATION) at 19:22

## 2022-11-01 RX ADMIN — FORMOTEROL FUMARATE DIHYDRATE 20 MCG: 20 SOLUTION RESPIRATORY (INHALATION) at 07:33

## 2022-11-01 RX ADMIN — APIXABAN 5 MG: 5 TABLET, FILM COATED ORAL at 09:53

## 2022-11-01 RX ADMIN — BUDESONIDE 0.5 MG: 0.5 INHALANT ORAL at 07:26

## 2022-11-01 RX ADMIN — CARVEDILOL 6.25 MG: 6.25 TABLET, FILM COATED ORAL at 09:53

## 2022-11-01 RX ADMIN — INSULIN LISPRO 8 UNITS: 100 INJECTION, SOLUTION INTRAVENOUS; SUBCUTANEOUS at 17:19

## 2022-11-01 RX ADMIN — PANTOPRAZOLE SODIUM 40 MG: 40 TABLET, DELAYED RELEASE ORAL at 17:18

## 2022-11-01 RX ADMIN — ASPIRIN 81 MG: 81 TABLET, COATED ORAL at 09:53

## 2022-11-01 RX ADMIN — INSULIN LISPRO 8 UNITS: 100 INJECTION, SOLUTION INTRAVENOUS; SUBCUTANEOUS at 12:12

## 2022-11-01 RX ADMIN — INSULIN LISPRO 6 UNITS: 100 INJECTION, SOLUTION INTRAVENOUS; SUBCUTANEOUS at 12:12

## 2022-11-01 RX ADMIN — IPRATROPIUM BROMIDE 0.5 MG: 0.5 SOLUTION RESPIRATORY (INHALATION) at 19:22

## 2022-11-01 NOTE — ASSESSMENT & PLAN NOTE
On presentation, patient was found to be meeting SIRS (tachycardia and tachypnea)  · Sepsis w/u was initiated     · UA showed innumerable WBC--urine cx + however difficult to ascertain if symptoms  · Blood cultures negative  · Completed course of intravenous antibiotics for possible pneumonia

## 2022-11-01 NOTE — PROGRESS NOTES
[facility]  Progress Note - Liborio Mejía Sr  1945, 78 y o  male MRN: [id number]  Unit/Bed#: [facility] 449-73 Encounter: [id number]  Primary Care Provider: Mirian Potts MD   Date and time admitted to hospital: 10/26/2022 12:03 PM    * Acute on chronic respiratory failure with hypoxia and hypercapnia ([facility] [address] )  Assessment & Plan  Hx of COPD (on 2L at baseline), HFpEF, Lung adenocarcinoma, and recurrent right pleural effusions was found to be hypoxic on presentation  Initially, trialed on 6L O2 but did not improve and became more encephalopathic  Was intubated and admitted to ICU  Thought to be 2/2 CHF vs COPD exacerbation  Given IV lasix 40 mg x1 in ED with good urine output overnight  His oxygenation status improved and he was extubated  · Transferred out of ICU on 10/29  · Home oxygen assessment pending  Patient has oxygen already at home but states he does not know how much or when to use it  He does also admit that he is noncompliant at times with oxygen and medications  Main issue at this point is that we have had difficulty contacting son  Patient does not have a phone so therefore when he is discharged, visiting nurses/therapists are unable to contact him  Patient lives with son  He is not agreeable to long-term care at this time  Case management is following to ensure safe discharge plan to prevent readmission    · See chronic diastolic heart failure and COPD Assessment & Plans below    Acute metabolic encephalopathy  Assessment & Plan  On presentation, was found to be encephalopathic likely 2/2 hypercapnia from respiratory failure  · Now resolved after being intubated and diuresed  · Monitor mental status     COPD (chronic obstructive pulmonary disease) (HCC)  Assessment & Plan  Hx of COPD with last PFTs showing FEV1/FVC 41%; FEV1 33%  · On 2L O2 at home  · On Incruse and Breo Ellipta at home  · Admitted for acute on chronic hypoxic hypercapnic respiratory failure thought to have component of COPD exacerbation  · Currently on Xopenex 1 25 mg TID, Atrovent 0 5 mg TID, Perforomist 20 mcg q12 and Pulmicort 0 5 mg q12  · Per ICU/pulmonology transfer note, monitor off systemic steroids for now, recently completed steroid course several days ago for COPD exacerbation    SIRS (systemic inflammatory response syndrome) ([facility] [address] )  Assessment & Plan  On presentation, patient was found to be meeting SIRS (tachycardia and tachypnea)  · Sepsis w/u was initiated  · UA showed innumerable WBC--urine cx + however difficult to ascertain if symptoms  · Blood cultures negative  · Completed course of intravenous antibiotics for possible pneumonia    Chronic diastolic heart failure [facility])  Assessment & Plan  Wt Readings from Last 3 Encounters:   11/01/22 97 4 kg (214 lb 11 7 oz)   10/23/22 95 8 kg (211 lb 3 2 oz)   10/15/22 98 7 kg (217 lb 11 1 oz)     Last echo on 05/2022 showed EF 60%; Moderately dilated RV, Mild AV and TV regurg  · At home is on Lasix 40 mg daily, Imdur 30 mg daily, and Coreg 6 25 mg BID  · On presentation, found to be volume overloaded   · S/p several doses of IV lasix, now on home dose p o  Lasix  Noncompliant with diuretics at times outpatient            A-fib (HCC)  Assessment & Plan  On Eliquis 5 mg BID and Coreg 6 25 mg at home  · Continue home Eliquis and Coreg    Pleural effusion on right  Assessment & Plan  Hx of lung adenocarcinoma s/p SBRT complicated by recurrent right pleural effusions likely 2/2 effects of radiation  · On most recent tap (04/2021) fluid found to be lymphocytic and exudative  · CXR on 10/26 showed chronic right-sided pleural effusion with mid-line shift  · Monitor for need for thoracentesis    Type 2 diabetes mellitus with hyperglycemia, with long-term current use of insulin [facility])  Assessment & Plan  Lab Results   Component Value Date    HGBA1C 9 7 (H) 10/10/2022       Recent Labs     10/31/22  1708 10/31/22  2116 10/31/22  2239 11/01/22  1002 POCGLU 54* 288* 269* 177*       A1c 9 7  · On most recent hospital discharge (10/24/22) was discharged on 40U lantus and Humalog 15U TID per Endocrinology recommendations  · On presentation BG elevated to 500s and was started on insulin drip  · Currently on Lantus 20 units HS Humalog 8 units t i d  with meals  · Titrate p r n  Acute-on-chronic kidney injury [facility])  Assessment & Plan  Lab Results   Component Value Date    EGFR 32 11/01/2022    EGFR 33 10/31/2022    EGFR 27 10/30/2022    CREATININE 1 96 (H) 11/01/2022    CREATININE 1 91 (H) 10/31/2022    CREATININE 2 23 (H) 10/30/2022     Previous baseline Cr 1 4-1 7 however most recent baseline creatinine documented around 2 6-2 8 per renal notes from recent admission last week   · Likely CKD progression  · Creat with improvement with diuresis in ICU  · Continue PO lasix  · Trend BMP  · Avoid nephrotoxins or hypotension    Essential hypertension  Assessment & Plan  On Coreg 6 25 BID and Imdur 30 mg daily PTA  · Continue, monitor    CAD (coronary artery disease)  Assessment & Plan  Has hx of MI s/p PCI  Of note had negative stress test May  · Continue asa/bb/statin/imdur    Acute on chronic anemia  Assessment & Plan  Found to have hemoglobin dropped to 6 3 on 10/28  Was given 1 unit packed red blood cells with improvement to 8 1    Hemoglobin has thereafter remained stable  · Given recent history of PUD and reported melanotic stool at home, GI was consulted  · No current plan for EGD/colonoscopy  · Recent EGD 10/20 with duodenal ulcer   · Aspirin and Eliquis were restarted  · Continue PPI  · Continue to monitor CBC  · Transfuse for hemoglobin below 7    History of prostate cancer  Assessment & Plan  hx of Prostate cancer s/p brachytherapy and EBRT  · Continue Flomax 0 4 mg     HLD (hyperlipidemia)  Assessment & Plan  On Simvastatin 40 mg at home--we do not carry this, patient with allergy" to alternative  · Resume home statin at discharge    Adenocarcinoma of lung [facility])  Assessment & Plan  Hx of Stage 1A2 lung adenocarcinoma s/p SBRT  · Follows with Pulmonology outpatient       VTE Pharmacologic Prophylaxis: VTE Score: 9 Moderate Risk (Score 3-4) - Pharmacological DVT Prophylaxis Ordered: apixaban (Eliquis)  Patient Centered Rounds: I performed bedside rounds with nursing staff today  Discussions with Specialists or Other Care Team Provider: nursing, case management     Education and Discussions with Family / Patient: Sister in law over phone  Was unable to get ahold of son        Time Spent for Care: 60 minutes  More than 50% of total time spent on counseling and coordination of care as described above  Current Length of Stay: 6 day(s)  Current Patient Status: Inpatient   Certification Statement: The patient will continue to require additional inpatient hospital stay due to pending safe discharge plan and clarification of oxygen requirement  Discharge Plan: Anticipate discharge tomorrow to home with home services  Code Status: Level 1 - Full Code    Subjective:   Patient offers no acute complaints  No shortness of breath  He admits that he is not compliant with oxygen and medications at times, states that his son is not reliable and does not help him  He was offered long-term care which he is refusing at this time  He does not have a phone of his own and does not have money to get his own at this time  Objective:     Vitals:   Temp (24hrs), Av 7 °F (36 5 °C), Min:97 4 °F (36 3 °C), Max:98 3 °F (36 8 °C)    Temp:  [97 4 °F (36 3 °C)-98 3 °F (36 8 °C)] 97 4 °F (36 3 °C)  HR:  [64-72] 66  Resp:  [17-18] 18  BP: (125-132)/(52-55) 130/52  SpO2:  [97 %-100 %] 99 %  Body mass index is 29 12 kg/m²  Input and Output Summary (last 24 hours):      Intake/Output Summary (Last 24 hours) at 2022 1045  Last data filed at 2022 0935  Gross per 24 hour   Intake 450 ml   Output 1425 ml   Net -975 ml       Physical Exam:   Physical Exam  Vitals and nursing note reviewed  Constitutional:       Appearance: He is obese  Interventions: Nasal cannula in place  Cardiovascular:      Rate and Rhythm: Normal rate  Rhythm irregular  Pulmonary:      Breath sounds: Decreased breath sounds present  Abdominal:      Tenderness: There is no abdominal tenderness  Musculoskeletal:         General: Swelling present  Skin:     General: Skin is warm  Neurological:      Mental Status: He is alert and oriented to person, place, and time  Mental status is at baseline  Psychiatric:         Mood and Affect: Mood normal  Affect is flat  Additional Data:     Labs:  Results from last 7 days   Lab Units 11/01/22  0538   WBC Thousand/uL 4 79   HEMOGLOBIN g/dL 8 1*   HEMATOCRIT % 25 3*   PLATELETS Thousands/uL 92*   NEUTROS PCT % 74   LYMPHS PCT % 13*   MONOS PCT % 10   EOS PCT % 3     Results from last 7 days   Lab Units 11/01/22  0538 10/28/22  0432 10/27/22  0431   SODIUM mmol/L 139   < > 140   POTASSIUM mmol/L 4 2   < > 5 1   CHLORIDE mmol/L 102   < > 106   CO2 mmol/L 33*   < > 34*   BUN mg/dL 41*   < > 51*   CREATININE mg/dL 1 96*   < > 2 33*   ANION GAP mmol/L 4   < > 0*   CALCIUM mg/dL 8 2*   < > 8 8   ALBUMIN g/dL  --   --  2 2*   TOTAL BILIRUBIN mg/dL  --   --  0 23   ALK PHOS U/L  --   --  77   ALT U/L  --   --  23   AST U/L  --   --  10   GLUCOSE RANDOM mg/dL 204*   < > 142*    < > = values in this interval not displayed           Results from last 7 days   Lab Units 11/01/22  0803 10/31/22  2239 10/31/22  2116 10/31/22  1708 10/31/22  1640 10/31/22  1137 10/31/22  0711 10/30/22  2121 10/30/22  1639 10/30/22  1158 10/30/22  0756 10/29/22  2133   POC GLUCOSE mg/dl 177* 269* 288* 54* 54* 224* 176* 239* 104 122 242* 112         Results from last 7 days   Lab Units 10/31/22  0608 10/26/22  1717 10/26/22  1409 10/26/22  1404   LACTIC ACID mmol/L  --  1 3  --  3 5*   PROCALCITONIN ng/ml 0 14  --  0 21  --        Lines/Drains:  Invasive Devices  Report    Peripheral Intravenous Line  Duration           Peripheral IV 10/30/22 Right;Ventral (anterior) Forearm 1 day                      Imaging: No pertinent imaging reviewed  Recent Cultures (last 7 days):   Results from last 7 days   Lab Units 10/26/22  1533 10/26/22  1532 10/26/22  1528 10/26/22  1527   BLOOD CULTURE   --   --  No Growth After 5 Days  No Growth After 5 Days    --    SPUTUM CULTURE   --   --   --  Few Colonies of Pseudomonas aeruginosa*  1+ Growth of    GRAM STAIN RESULT   --   --   --  No Polys or Bacteria seen   URINE CULTURE  >100,000 cfu/ml Klebsiella variicola*  --   --   --    LEGIONELLA URINARY ANTIGEN   --  Negative  --   --        Last 24 Hours Medication List:   Current Facility-Administered Medications   Medication Dose Route Frequency Provider Last Rate   • acetaminophen  975 mg Oral Q6H PRN Valeriy Silverio MD     • apixaban  5 mg Oral BID Valeriy Silverio MD     • aspirin  81 mg Oral Daily Phoebe Sauceda MD     • budesonide  0 5 mg Nebulization Q12H Valeriy Silverio MD     • carvedilol  6 25 mg Oral BID With Meals Valeriy Silverio MD     • chlorhexidine  15 mL Mouth/Throat Q12H [facility] Phoebe Sauceda MD     • fentanyl citrate (PF)  100 mcg Intravenous Q2H PRN Valeriy Silverio MD     • formoterol  20 mcg Nebulization Q12H Valeriy Silverio MD     • furosemide  40 mg Oral Daily YOLANDE Fiore     • insulin glargine  20 Units Subcutaneous HS Valeriy Silverio MD     • insulin lispro  2-12 Units Subcutaneous TID AC Phoebe Sauceda MD     • insulin lispro  8 Units Subcutaneous TID With Meals Valeriy Silverio MD     • ipratropium  0 5 mg Nebulization TID Valeriy Silverio MD     • isosorbide mononitrate  30 mg Oral Daily Valeriy Silverio MD     • levalbuterol  1 25 mg Nebulization TID Valeriy Silverio MD     • Lidocaine Viscous HCl  15 mL Swish & Swallow 4x Daily PRN Valeriy Silverio MD     • pantoprazole  40 mg Oral BID AC Valeriy Silverio MD     • tamsulosin  0 4 mg Oral Daily With [email or web address] Today, Patient Was Seen By: Ashleigh Benitez    **Please Note: This note may have been constructed using a voice recognition system  **

## 2022-11-01 NOTE — ASSESSMENT & PLAN NOTE
Wt Readings from Last 3 Encounters:   11/01/22 97 4 kg (214 lb 11 7 oz)   10/23/22 95 8 kg (211 lb 3 2 oz)   10/15/22 98 7 kg (217 lb 11 1 oz)     Last echo on 05/2022 showed EF 60%; Moderately dilated RV, Mild AV and TV regurg  · At home is on Lasix 40 mg daily, Imdur 30 mg daily, and Coreg 6 25 mg BID  · On presentation, found to be volume overloaded   · S/p several doses of IV lasix, now on home dose p o  Lasix  Noncompliant with diuretics at times outpatient

## 2022-11-01 NOTE — PLAN OF CARE
Problem: MOBILITY - ADULT  Goal: Maintain or return to baseline ADL function  Description: INTERVENTIONS:  -  Assess patient's ability to carry out ADLs; assess patient's baseline for ADL function and identify physical deficits which impact ability to perform ADLs (bathing, care of mouth/teeth, toileting, grooming, dressing, etc )  - Assess/evaluate cause of self-care deficits   - Assess range of motion  - Assess patient's mobility; develop plan if impaired  - Assess patient's need for assistive devices and provide as appropriate  - Encourage maximum independence but intervene and supervise when necessary  - Involve family in performance of ADLs  - Assess for home care needs following discharge   - Consider OT consult to assist with ADL evaluation and planning for discharge  - Provide patient education as appropriate  Outcome: Progressing  Goal: Maintains/Returns to pre admission functional level  Description: INTERVENTIONS:  - Perform BMAT or MOVE assessment daily    - Set and communicate daily mobility goal to care team and patient/family/caregiver  - Collaborate with rehabilitation services on mobility goals if consulted  - Perform Range of Motion  times a day  - Reposition patient every  hours    - Dangle patient  times a day  - Stand patient  times a day  - Ambulate patient  times a day  - Out of bed to chair  times a day   - Out of bed for meals  times a day  - Out of bed for toileting  - Record patient progress and toleration of activity level   Outcome: Progressing     Problem: PAIN - ADULT  Goal: Verbalizes/displays adequate comfort level or baseline comfort level  Description: Interventions:  - Encourage patient to monitor pain and request assistance  - Assess pain using appropriate pain scale  - Administer analgesics based on type and severity of pain and evaluate response  - Implement non-pharmacological measures as appropriate and evaluate response  - Consider cultural and social influences on pain and pain management  - Notify physician/advanced practitioner if interventions unsuccessful or patient reports new pain  Outcome: Progressing     Problem: INFECTION - ADULT  Goal: Absence or prevention of progression during hospitalization  Description: INTERVENTIONS:  - Assess and monitor for signs and symptoms of infection  - Monitor lab/diagnostic results  - Monitor all insertion sites, i e  indwelling lines, tubes, and drains  - Monitor endotracheal if appropriate and nasal secretions for changes in amount and color  - [place] appropriate cooling/warming therapies per order  - Administer medications as ordered  - Instruct and encourage patient and family to use good hand hygiene technique  - Identify and instruct in appropriate isolation precautions for identified infection/condition  Outcome: Progressing  Goal: Absence of fever/infection during neutropenic period  Description: INTERVENTIONS:  - Monitor WBC    Outcome: Progressing     Problem: SAFETY ADULT  Goal: Maintain or return to baseline ADL function  Description: INTERVENTIONS:  -  Assess patient's ability to carry out ADLs; assess patient's baseline for ADL function and identify physical deficits which impact ability to perform ADLs (bathing, care of mouth/teeth, toileting, grooming, dressing, etc )  - Assess/evaluate cause of self-care deficits   - Assess range of motion  - Assess patient's mobility; develop plan if impaired  - Assess patient's need for assistive devices and provide as appropriate  - Encourage maximum independence but intervene and supervise when necessary  - Involve family in performance of ADLs  - Assess for home care needs following discharge   - Consider OT consult to assist with ADL evaluation and planning for discharge  - Provide patient education as appropriate  Outcome: Progressing  Goal: Maintains/Returns to pre admission functional level  Description: INTERVENTIONS:  - Perform BMAT or MOVE assessment daily    - Set and communicate daily mobility goal to care team and patient/family/caregiver  - Collaborate with rehabilitation services on mobility goals if consulted  - Perform Range of Motion  times a day  - Reposition patient every  hours    - Dangle patient  times a day  - Stand patient  times a day  - Ambulate patient  times a day  - Out of bed to chair  times a day   - Out of bed for meals times a day  - Out of bed for toileting  - Record patient progress and toleration of activity level   Outcome: Progressing  Goal: Patient will remain free of falls  Description: INTERVENTIONS:  - Educate patient/family on patient safety including physical limitations  - Instruct patient to call for assistance with activity   - Consult OT/PT to assist with strengthening/mobility   - Keep Call bell within reach  - Keep bed low and locked with side rails adjusted as appropriate  - Keep care items and personal belongings within reach  - Initiate and maintain comfort rounds  - Make Fall Risk Sign visible to staff  - Offer Toileting every  Hours, in advance of need  - Initiate/Maintain alarm  - Obtain necessary fall risk management equipment:   - Apply yellow socks and bracelet for high fall risk patients  - Consider moving patient to room near nurses station  Outcome: Progressing

## 2022-11-01 NOTE — ASSESSMENT & PLAN NOTE
Lab Results   Component Value Date    HGBA1C 9 7 (H) 10/10/2022       Recent Labs     10/31/22  1708 10/31/22  2116 10/31/22  2239 11/01/22  0803   POCGLU 54* 288* 269* 177*       A1c 9 7  · On most recent hospital discharge (10/24/22) was discharged on 40U lantus and Humalog 15U TID per Endocrinology recommendations  · On presentation BG elevated to 500s and was started on insulin drip  · Currently on Lantus 20 units HS Humalog 8 units t i d  with meals  · Titrate p r n

## 2022-11-01 NOTE — ASSESSMENT & PLAN NOTE
Lab Results   Component Value Date    EGFR 32 11/01/2022    EGFR 33 10/31/2022    EGFR 27 10/30/2022    CREATININE 1 96 (H) 11/01/2022    CREATININE 1 91 (H) 10/31/2022    CREATININE 2 23 (H) 10/30/2022     Previous baseline Cr 1 4-1 7 however most recent baseline creatinine documented around 2 6-2 8 per renal notes from recent admission last week   · Likely CKD progression  · Creat with improvement with diuresis in ICU  · Continue PO lasix  · Trend BMP  · Avoid nephrotoxins or hypotension

## 2022-11-01 NOTE — ASSESSMENT & PLAN NOTE
Hx of COPD (on 2L at baseline), HFpEF, Lung adenocarcinoma, and recurrent right pleural effusions was found to be hypoxic on presentation  Initially, trialed on 6L O2 but did not improve and became more encephalopathic  Was intubated and admitted to ICU  Thought to be 2/2 CHF vs COPD exacerbation  Given IV lasix 40 mg x1 in ED with good urine output overnight  His oxygenation status improved and he was extubated  · Transferred out of ICU on 10/29  · Home oxygen assessment pending  Patient has oxygen already at home but states he does not know how much or when to use it  He does also admit that he is noncompliant at times with oxygen and medications  Main issue at this point is that we have had difficulty contacting son  Patient does not have a phone so therefore when he is discharged, visiting nurses/therapists are unable to contact him  Patient lives with son  He is not agreeable to long-term care at this time  Case management is following to ensure safe discharge plan to prevent readmission    · See chronic diastolic heart failure and COPD Assessment & Plans below

## 2022-11-02 VITALS
RESPIRATION RATE: 20 BRPM | BODY MASS INDEX: 29.12 KG/M2 | OXYGEN SATURATION: 96 % | HEART RATE: 94 BPM | WEIGHT: 214.73 LBS | TEMPERATURE: 98.5 F | DIASTOLIC BLOOD PRESSURE: 58 MMHG | SYSTOLIC BLOOD PRESSURE: 140 MMHG

## 2022-11-02 LAB
ANION GAP SERPL CALCULATED.3IONS-SCNC: 4 MMOL/L (ref 4–13)
BASOPHILS # BLD AUTO: 0.01 THOUSANDS/ÂΜL (ref 0–0.1)
BASOPHILS NFR BLD AUTO: 0 % (ref 0–1)
BUN SERPL-MCNC: 43 MG/DL (ref 5–25)
CALCIUM SERPL-MCNC: 8.2 MG/DL (ref 8.3–10.1)
CHLORIDE SERPL-SCNC: 103 MMOL/L (ref 96–108)
CO2 SERPL-SCNC: 33 MMOL/L (ref 21–32)
CREAT SERPL-MCNC: 2.1 MG/DL (ref 0.6–1.3)
EOSINOPHIL # BLD AUTO: 0.09 THOUSAND/ÂΜL (ref 0–0.61)
EOSINOPHIL NFR BLD AUTO: 2 % (ref 0–6)
ERYTHROCYTE [DISTWIDTH] IN BLOOD BY AUTOMATED COUNT: 15.1 % (ref 11.6–15.1)
GFR SERPL CREATININE-BSD FRML MDRD: 29 ML/MIN/1.73SQ M
GLUCOSE SERPL-MCNC: 120 MG/DL (ref 65–140)
GLUCOSE SERPL-MCNC: 139 MG/DL (ref 65–140)
GLUCOSE SERPL-MCNC: 199 MG/DL (ref 65–140)
GLUCOSE SERPL-MCNC: 251 MG/DL (ref 65–140)
GLUCOSE SERPL-MCNC: 385 MG/DL (ref 65–140)
HCT VFR BLD AUTO: 28 % (ref 36.5–49.3)
HGB BLD-MCNC: 8.6 G/DL (ref 12–17)
IMM GRANULOCYTES # BLD AUTO: 0.02 THOUSAND/UL (ref 0–0.2)
IMM GRANULOCYTES NFR BLD AUTO: 0 % (ref 0–2)
LYMPHOCYTES # BLD AUTO: 0.73 THOUSANDS/ÂΜL (ref 0.6–4.47)
LYMPHOCYTES NFR BLD AUTO: 13 % (ref 14–44)
MCH RBC QN AUTO: 27.6 PG (ref 26.8–34.3)
MCHC RBC AUTO-ENTMCNC: 30.7 G/DL (ref 31.4–37.4)
MCV RBC AUTO: 90 FL (ref 82–98)
MONOCYTES # BLD AUTO: 0.61 THOUSAND/ÂΜL (ref 0.17–1.22)
MONOCYTES NFR BLD AUTO: 11 % (ref 4–12)
NEUTROPHILS # BLD AUTO: 4.15 THOUSANDS/ÂΜL (ref 1.85–7.62)
NEUTS SEG NFR BLD AUTO: 74 % (ref 43–75)
NRBC BLD AUTO-RTO: 0 /100 WBCS
PLATELET # BLD AUTO: 109 THOUSANDS/UL (ref 149–390)
PMV BLD AUTO: 10.9 FL (ref 8.9–12.7)
POTASSIUM SERPL-SCNC: 4.4 MMOL/L (ref 3.5–5.3)
RBC # BLD AUTO: 3.12 MILLION/UL (ref 3.88–5.62)
SODIUM SERPL-SCNC: 140 MMOL/L (ref 135–147)
WBC # BLD AUTO: 5.61 THOUSAND/UL (ref 4.31–10.16)

## 2022-11-02 RX ORDER — INSULIN LISPRO 100 [IU]/ML
10 INJECTION, SOLUTION INTRAVENOUS; SUBCUTANEOUS
Qty: 15 ML | Refills: 0
Start: 2022-11-02

## 2022-11-02 RX ORDER — INSULIN GLARGINE 100 [IU]/ML
30 INJECTION, SOLUTION SUBCUTANEOUS
Qty: 12 ML | Refills: 0
Start: 2022-11-02 | End: 2022-12-02

## 2022-11-02 RX ADMIN — INSULIN LISPRO 8 UNITS: 100 INJECTION, SOLUTION INTRAVENOUS; SUBCUTANEOUS at 12:42

## 2022-11-02 RX ADMIN — INSULIN LISPRO 10 UNITS: 100 INJECTION, SOLUTION INTRAVENOUS; SUBCUTANEOUS at 00:04

## 2022-11-02 RX ADMIN — FUROSEMIDE 40 MG: 40 TABLET ORAL at 08:31

## 2022-11-02 RX ADMIN — CARVEDILOL 6.25 MG: 6.25 TABLET, FILM COATED ORAL at 08:31

## 2022-11-02 RX ADMIN — APIXABAN 5 MG: 5 TABLET, FILM COATED ORAL at 08:31

## 2022-11-02 RX ADMIN — INSULIN LISPRO 2 UNITS: 100 INJECTION, SOLUTION INTRAVENOUS; SUBCUTANEOUS at 12:42

## 2022-11-02 RX ADMIN — PANTOPRAZOLE SODIUM 40 MG: 40 TABLET, DELAYED RELEASE ORAL at 08:35

## 2022-11-02 RX ADMIN — ASPIRIN 81 MG: 81 TABLET, COATED ORAL at 08:31

## 2022-11-02 RX ADMIN — INSULIN LISPRO 8 UNITS: 100 INJECTION, SOLUTION INTRAVENOUS; SUBCUTANEOUS at 08:30

## 2022-11-02 RX ADMIN — ISOSORBIDE MONONITRATE 30 MG: 30 TABLET, EXTENDED RELEASE ORAL at 08:31

## 2022-11-02 NOTE — NURSING NOTE
Pt c/o feeling "n ot right" and haylie  Requested blooed sugar to be checked  ; SLIM notified  New insulin orders obtained and started  Pt aware  Requests BS to be checked at 0200

## 2022-11-02 NOTE — DISCHARGE SUMMARY
[facility]    Patient Information: Dominguez Chávez Sr  78 y o  male MRN: [id number]  Unit/Bed#: [facility] 492-16 Encounter: [id number]    Discharging Physician / Practitioner: Haris Medina  PCP: Toña Ross MD  Admission Date: 10/26/2022  Discharge Date: 11/02/22    Reason for Admission:  Acute on chronic respiratory failure with hypoxia and hypercapnia    Discharge Diagnoses:     Principal Problem:    Acute on chronic respiratory failure with hypoxia and hypercapnia ([facility] )  Active Problems:    COPD (chronic obstructive pulmonary disease) ([facility] )    Acute metabolic encephalopathy    Chronic diastolic heart failure (HCC)    SIRS (systemic inflammatory response syndrome) ([place] [address] )    Type 2 diabetes mellitus with hyperglycemia, with long-term current use of insulin (HCC)    Pleural effusion on right    A-fib ([facility] )    Acute-on-chronic kidney injury ([place] [address] )    Essential hypertension    CAD (coronary artery disease)    HLD (hyperlipidemia)    History of prostate cancer    Acute on chronic anemia    Adenocarcinoma of lung ([place] [address] )  Resolved Problems:    * No resolved hospital problems  *      Consultations During Hospital Stay:  · Gastroenterology  · PT/OT  · Case management    Procedures Performed:     · None    Significant Findings / Test Results:     XR chest portable ICU   Final Result by Antony Mike MD (10/26 1631)      Small-to-moderate right pleural effusion with underlying atelectasis  Decreased right lung volume  Workstation performed: [id number]         XR chest portable   Final Result by Andrea Butts MD (10/26 1419)         1  Chronic opacification with volume loss in the right hemithorax is similar to prior studies  2   Interval intubation                    Workstation performed: [id number]         XR chest portable   Final Result by Tete Miner MD (10/26 1257)      Chronic right-sided changes including midline shift, increased right effusion and chronic right base atelectasis                  Workstation performed: [id number]             Incidental Findings:   · None    Test Results Pending at Discharge (will require follow up): · None     Outpatient Tests Requested:  · Outpatient follow-up with PCP within 1 week  · Outpatient follow-up with pulmonology and Cardiology as scheduled  · Outpatient follow-up with Endocrinology    Complications:  Noncompliance    Hospital Course:     Claudette Knapp  is a 78 y o  male patient with past medical history of medical noncompliance, COPD, chronic respiratory failure on chronic oxygen although he is repeatedly noncompliant, uncontrolled diabetes, diastolic heart failure, atrial fibrillation on Eliquis, history of peptic ulcer disease, CKD with baseline creatinine of 2-2 8, hypertension, CAD status post PCI, anemia, prostate cancer status post brachytherapy, hyperlipidemia, adeno carcinoma of the lung status post radiation who originally presented to the hospital on 10/26/2022 due to encephalopathy, shortness of breath/respiratory distress and hypoxia  Patient was intubated  He was diuresed in the ICU, oxygenation improved and he was extubated  Patient was also treated for possible pneumonia  He did have acute anemia and was given a blood transfusion, GI was consulted given recent upper GI bleed however did not recommend additional scope  Subsequently hemoglobins have been stable  Multiple zain discussions with patient regarding his medication compliance as well as oxygen compliance  Patient tells different providers, various things  It appears as though he was only using oxygen “as needed " I do not believe he was taking his diuretics as he does admit to getting “confused" regarding his multiple medications and changes in his regimen recently  His son is very difficult to get a hold of when he is in the hospital and usually does not answer the phone despite messages    I was able to speak with his sister-in-law who also reported concerns of noncompliance  Each time he has been in the hospital, we have attempted to set up visiting nurses, therapists and social workers however they are usually unable to contact patient/son and therefore he is discharged from their service  Case management and I did discuss possibility of long-term care placement however he is refusing this at this time  Given his stated confusion regarding his oxygen requirement, I did attempt for respiratory to evaluate in twice to give him clarity on how much oxygen he should be using and when  He refused this evaluation on two separate occasions  He has been satting appropriately on 2 L therefore I have instructed him to use 2 L around the clock on discharge  He will be provided accurate list of medications on discharge  He has follow-up scheduled with pulmonology and Cardiology  Additionally, I have made a referral to endocrinology for his uncontrolled diabetes with labile blood sugars  Patient is medically stable for discharge however given his ongoing poor social situation, he remains high risk for readmission  PT/OT cleared him for home and at this point, there is no concern for lack of decision making capacity  Condition at Discharge: fair     Discharge Day Visit / Exam:     Subjective:  Patient offers no acute complaints  Agreeable for discharge home today  States he does not have a ride  Vitals: Blood Pressure: 140/58 (11/02/22 7823)  Pulse: 94 (11/02/22 0638)  Temperature: 98 5 °F (36 9 °C) (11/02/22 0638)  Temp Source: Oral (10/30/22 0337)  Respirations: 20 (11/02/22 0563)  Weight - Scale: 97 4 kg (214 lb 11 7 oz) (11/01/22 0544)  SpO2: 96 % (11/02/22 5213)  Exam:   Physical Exam  Vitals and nursing note reviewed  Constitutional:       Appearance: He is obese  Interventions: Nasal cannula in place  Cardiovascular:      Rate and Rhythm: Normal rate     Pulmonary:      Breath sounds: Decreased breath sounds present  Musculoskeletal:         General: Swelling present  Skin:     General: Skin is warm  Neurological:      Mental Status: He is alert and oriented to person, place, and time  Mental status is at baseline  Psychiatric:         Mood and Affect: Affect is flat  Discussion with Family:  Patient    Discharge instructions/Information to patient and family:   See after visit summary for information provided to patient and family  Provisions for Follow-Up Care:  See after visit summary for information related to follow-up care and any pertinent home health orders  Disposition:     Home with [facility] Services (Reminder: Complete face to face encounter)    For Discharges to [facility]:   · Not Applicable to this Patient - Not Applicable to this Patient    Planned Readmission: no     Discharge Statement:  I spent 40 minutes discharging the patient  This time was spent on the day of discharge  I had direct contact with the patient on the day of discharge  Greater than 50% of the total time was spent examining patient, answering all patient questions, arranging and discussing plan of care with patient as well as directly providing post-discharge instructions  Additional time then spent on discharge activities  Discharge Medications:  See after visit summary for reconciled discharge medications provided to patient and family        ** Please Note: This note has been constructed using a voice recognition system **

## 2022-11-02 NOTE — CASE MANAGEMENT
Case Management Discharge Planning Note    Patient name Nora Pierce   Location [facility] 559-54 MRN [id number]  : 1945 Date 2022       Current Admission Date: 10/26/2022  Current Admission Diagnosis:Acute on chronic respiratory failure with hypoxia and hypercapnia [facility])   Patient Active Problem List    Diagnosis Date Noted   • Acute on chronic anemia 10/29/2022   • Acute-on-ch[facility] ) 10/27/2022   • SIRS (systemic inflammatory response syndrome) ([place] [address] ) 10/27/2022   • A-fib ([place] [address] ) 10/10/2022   • Frequent hospital admissions 2022   • Hypothermia 2022   • Epididymitis, bilateral 06/15/2022   • Chronic left-sided low back pain without sciatica 2022   • Stage 3b chronic kidney disease (CKD) ([place] [address] ) 2022   • History of prostate cancer 2022   • Adenocarcinoma of lung ([place] [address] ) 2022   • Fracture of navicular bone of left foot 2021   • Acute on chronic respiratory failure with hypoxia and hypercapnia ([facility] ) 04/15/2021   • PAD (peripheral artery disease) ([place] [address] ) 2021   • DDD (degenerative disc disease), lumbar 2020   • Anemia, iron deficiency 2020   • Lung nodule 2020   • Pulmonary hypertension ([place] [address] ) 2019   • JACQUELINE (obstructive sleep apnea) 2019   • COPD with acute exacerbation ([place] [address] ) 2019   • Oxygen dependent 2018   • Acute metabolic encephalopathy    • RBBB 2018   • CAD (coronary artery disease) 2018   • Chronic diastolic heart failure ([facility] ) 2018   • Pleural effusion on right 10/31/2017   • Diabetic neuropathy ([facility] ) 2017   • Essential hypertension 2016   • Venous stasis dermatitis of both lower extremities 11/15/2016   • Type 2 diabetes mellitus with hyperglycemia, with long-term current use of insulin ([place] [address] ) 2016   • COPD (chronic obstructive pulmonary disease) ([place] [address] ) 2016   • HLD (hyperlipidemia) 2016      LOS (days): 7  Geometric Mean LOS (GMLOS) (days): 5 00  Days to GMLOS:-1 9     OBJECTIVE:  Risk of Unplanned Readmission Score: 84 15         Current admission status: Inpatient   Preferred Pharmacy:   [address] [place] [address], [address] [place] [address] [address] [place] [address]  Phone: [phone] Fax: [phone] 100 [facility] [place],[address], [address] [facility] [address] [place] [address]  Phone: [phone] Fax: [phone]    Primary Care Provider: Anamaria Martini MD    Primary Insurance: [facility]  Secondary Insurance:     DISCHARGE DETAILS:  Late entry from 11/1/22  This CM had 3 way TC with patient's NAHOMY and son  Son stated that he will attempt to activate patient's cell phone so that Radhavin Antunez can contact patient for visits  Patient aware that he is to wear his oxygen 24/7, states he understands  This CM offered to assist patient with LTC, patient states he is not interested in LTC at this time    Son made aware

## 2022-11-02 NOTE — PLAN OF CARE
Problem: MOBILITY - ADULT  Goal: Maintain or return to baseline ADL function  Description: INTERVENTIONS:  -  Assess patient's ability to carry out ADLs; assess patient's baseline for ADL function and identify physical deficits which impact ability to perform ADLs (bathing, care of mouth/teeth, toileting, grooming, dressing, etc )  - Assess/evaluate cause of self-care deficits   - Assess range of motion  - Assess patient's mobility; develop plan if impaired  - Assess patient's need for assistive devices and provide as appropriate  - Encourage maximum independence but intervene and supervise when necessary  - Involve family in performance of ADLs  - Assess for home care needs following discharge   - Consider OT consult to assist with ADL evaluation and planning for discharge  - Provide patient education as appropriate  Outcome: Progressing    Problem: DISCHARGE PLANNING  Goal: Discharge to home or other facility with appropriate resources  Description: INTERVENTIONS:  - Identify barriers to discharge w/patient and caregiver  - Arrange for needed discharge resources and transportation as appropriate  - Identify discharge learning needs (meds, wound care, etc )  - Arrange for interpretive services to assist at discharge as needed  - Refer to Case Management Department for coordinating discharge planning if the patient needs post-hospital services based on physician/advanced practitioner order or complex needs related to functional status, cognitive ability, or social support system  Outcome: Progressing     Problem: Knowledge Deficit  Goal: Patient/family/caregiver demonstrates understanding of disease process, treatment plan, medications, and discharge instructions  Description: Complete learning assessment and assess knowledge base    Interventions:  - Provide teaching at level of understanding  - Provide teaching via preferred learning methods  Outcome: Progressing

## 2022-11-02 NOTE — PLAN OF CARE
Problem: MOBILITY - ADULT  Goal: Maintain or return to baseline ADL function  Description: INTERVENTIONS:  -  Assess patient's ability to carry out ADLs; assess patient's baseline for ADL function and identify physical deficits which impact ability to perform ADLs (bathing, care of mouth/teeth, toileting, grooming, dressing, etc )  - Assess/evaluate cause of self-care deficits   - Assess range of motion  - Assess patient's mobility; develop plan if impaired  - Assess patient's need for assistive devices and provide as appropriate  - Encourage maximum independence but intervene and supervise when necessary  - Involve family in performance of ADLs  - Assess for home care needs following discharge   - Consider OT consult to assist with ADL evaluation and planning for discharge  - Provide patient education as appropriate  Outcome: Progressing     Problem: PAIN - ADULT  Goal: Verbalizes/displays adequate comfort level or baseline comfort level  Description: Interventions:  - Encourage patient to monitor pain and request assistance  - Assess pain using appropriate pain scale  - Administer analgesics based on type and severity of pain and evaluate response  - Implement non-pharmacological measures as appropriate and evaluate response  - Consider cultural and social influences on pain and pain management  - Notify physician/advanced practitioner if interventions unsuccessful or patient reports new pain  Outcome: Progressing     Problem: INFECTION - ADULT  Goal: Absence or prevention of progression during hospitalization  Description: INTERVENTIONS:  - Assess and monitor for signs and symptoms of infection  - Monitor lab/diagnostic results  - Monitor all insertion sites, i e  indwelling lines, tubes, and drains  - Monitor endotracheal if appropriate and nasal secretions for changes in amount and color  - [place] appropriate cooling/warming therapies per order  - Administer medications as ordered  - Instruct and encourage patient and family to use good hand hygiene technique  - Identify and instruct in appropriate isolation precautions for identified infection/condition  Outcome: Progressing

## 2022-11-02 NOTE — CASE MANAGEMENT
Case Management Discharge Planning Note    Patient name Eloise Robertson   Location [facility] 708-28 MRN [id number]  : 1945 Date 2022       Current Admission Date: 10/26/2022  Current Admission Diagnosis:Acute on chronic respiratory failure with hypoxia and hypercapnia [facility])   Patient Active Problem List    Diagnosis Date Noted   • Acute on chronic anemia 10/29/2022   • Acute-on-chronic kidney injury ([facility] 75 ) 10/27/2022   • SIRS (systemic inflammatory response syndrome) ([place] [address] ) 10/27/2022   • A-fib ([place] [address] ) 10/10/2022   • Frequent hospital admissions 2022   • Hypothermia 2022   • Epididymitis, bilateral 06/15/2022   • Chronic left-sided low back pain without sciatica 2022   • Stage 3b chronic kidney disease (CKD) ([place] [address] ) 2022   • History of prostate cancer 2022   • Adenocarcinoma of lung ([place] [address] ) 2022   • Fracture of navicular bone of left foot 2021   • Acute on chronic respiratory failure with hypoxia and hypercapnia ([place] [address] ) 04/15/2021   • PAD (peripheral artery disease) ([place] [address] ) 2021   • DDD (degenerative disc disease), lumbar 2020   • Anemia, iron deficiency 2020   • Lung nodule 2020   • Pulmonary hypertension ([place] [address] ) 2019   • JACQUELINE (obstructive sleep apnea) 2019   • COPD with acute exacerbation ([place] [address] ) 2019   • Oxygen dependent 2018   • Acute metabolic encephalopathy    • RBBB 2018   • CAD (coronary artery disease) 2018   • Chronic diastolic heart failure ([place] [address] ) 2018   • Pleural effusion on right 10/31/2017   • Diabetic neuropathy ([facility] 75 ) 2017   • Essential hypertension 2016   • Venous stasis dermatitis of both lower extremities 11/15/2016   • Type 2 diabetes mellitus with hyperglycemia, with long-term current use of insulin ([place] [address] ) 2016   • COPD (chronic obstructive pulmonary disease) ([place] [address] ) 2016   • HLD (hyperlipidemia) 2016      LOS (days): 7  Geometric Mean LOS (GMLOS) (days): 5 00  Days to LOS:-1 9     OBJECTIVE:  Risk of Unplanned Readmission Score: 84 18         Current admission status: Inpatient   Preferred Pharmacy:   [address] [facility], [address] [place] [address] [address] [place] [address]  Phone: [phone] Fax: [phone] [address] 94 [facility] 18 Station [facility] 91 210 [facility]  Phone: [phone] Fax: [phone]    Primary Care Provider: Jett Goodman MD    Primary Insurance: [facility]  Secondary Insurance:     DISCHARGE DETAILS:    Discharge planning discussed with[de-identified] Patient  Freedom of Choice: Yes    Other Referral/Resources/Interventions Provided:  Referral Comments: Patient is on O2 2l, cannot go via LYFT, as he is requesting  Patient agreeable to [facility] due to need for oxygen  Transport at Discharge : Wheelchair van  Dispatcher Contacted: Yes  Number/Name of Dispatcher: [facility]  Transported by [employer] and Unit #): Bobbi  ETA of Transport (Date): 11/02/22  ETA of Transport (Time): 1300      IMM reviewed with patient, patient agrees with discharge determination

## 2022-11-03 ENCOUNTER — TELEPHONE (OUTPATIENT)
Dept: GASTROENTEROLOGY | Facility: CLINIC | Age: 77
End: 2022-11-03

## 2022-11-03 NOTE — TELEPHONE ENCOUNTER
----- Message from Zina Enamorado MD sent at 10/31/2022  2:08 PM EDT -----  Can you please arrange for follow-up for this patient to be seen in clinic in 3-4 weeks

## 2022-11-03 NOTE — UTILIZATION REVIEW
NOTIFICATION OF ADMISSION DISCHARGE   This is a Notification of Discharge from [address]  Please be advised that this patient has been discharge from our facility  Below you will find the admission and discharge date and time including the patient’s disposition  UTILIZATION REVIEW CONTACT:  Violette Lester  Utilization   Network Utilization Review Department  Phone: [phone] x carefully listen to the prompts  All voicemails are confidential   Email: [email or web address]     ADMISSION INFORMATION  PRESENTATION DATE: 10/26/2022 12:03 PM  OBERVATION ADMISSION DATE:   INPATIENT ADMISSION DATE: 10/26/22  1:57 PM   DISCHARGE DATE: 11/2/2022  1:39 PM  DIS[facility]ITION[facility] [address] INFORMATION:  Send all requests for admission clinical reviews, approved or denied determinations and any other requests to dedicated fax number below belonging to the campus where the patient is receiving treatment   List of dedicated fax numbers:  [address] DENIALS (Administrative/Medical Necessity) [phone]   [address] (Maternity/NICU/Pediatrics) [phone]   [facility] [phone]   [facility] [phone]   [address] [phone]   220 [facility] [phone] 90 [facility] [phone]   [address] [place] [address] 119 [phone]   [facility]  [phone]   4056 [facility] [phone] 412 [facility] 850 [address] [facility] [phone]

## 2022-11-04 ENCOUNTER — TRANSITIONAL CARE MANAGEMENT (OUTPATIENT)
Dept: FAMILY MEDICINE CLINIC | Facility: CLINIC | Age: 77
End: 2022-11-04

## 2022-11-07 ENCOUNTER — HOSPITAL ENCOUNTER (INPATIENT)
Facility: HOSPITAL | Age: 77
LOS: 1 days | Discharge: HOME | End: 2022-11-09
Attending: EMERGENCY MEDICINE | Admitting: INTERNAL MEDICINE

## 2022-11-07 ENCOUNTER — APPOINTMENT (EMERGENCY)
Dept: RADIOLOGY | Facility: HOSPITAL | Age: 77
End: 2022-11-07

## 2022-11-07 DIAGNOSIS — J90 RECURRENT RIGHT PLEURAL EFFUSION: ICD-10-CM

## 2022-11-07 DIAGNOSIS — R06.00 DYSPNEA: Primary | ICD-10-CM

## 2022-11-07 DIAGNOSIS — Z78.9 FREQUENT HOSPITAL ADMISSIONS: ICD-10-CM

## 2022-11-07 LAB
ALBUMIN SERPL BCP-MCNC: 2.9 G/DL (ref 3.5–5)
ALP SERPL-CCNC: 77 U/L (ref 46–116)
ALT SERPL W P-5'-P-CCNC: 19 U/L (ref 12–78)
ANION GAP SERPL CALCULATED.3IONS-SCNC: 1 MMOL/L (ref 4–13)
AST SERPL W P-5'-P-CCNC: 10 U/L (ref 5–45)
BASOPHILS # BLD AUTO: 0.02 THOUSANDS/ÂΜL (ref 0–0.1)
BASOPHILS NFR BLD AUTO: 0 % (ref 0–1)
BILIRUB SERPL-MCNC: 0.28 MG/DL (ref 0.2–1)
BUN SERPL-MCNC: 30 MG/DL (ref 5–25)
CALCIUM ALBUM COR SERPL-MCNC: 9.2 MG/DL (ref 8.3–10.1)
CALCIUM SERPL-MCNC: 8.3 MG/DL (ref 8.3–10.1)
CARDIAC TROPONIN I PNL SERPL HS: 15 NG/L
CHLORIDE SERPL-SCNC: 107 MMOL/L (ref 96–108)
CO2 SERPL-SCNC: 34 MMOL/L (ref 21–32)
CREAT SERPL-MCNC: 1.93 MG/DL (ref 0.6–1.3)
EOSINOPHIL # BLD AUTO: 0.07 THOUSAND/ÂΜL (ref 0–0.61)
EOSINOPHIL NFR BLD AUTO: 1 % (ref 0–6)
ERYTHROCYTE [DISTWIDTH] IN BLOOD BY AUTOMATED COUNT: 15.4 % (ref 11.6–15.1)
GFR SERPL CREATININE-BSD FRML MDRD: 33 ML/MIN/1.73SQ M
GLUCOSE SERPL-MCNC: 160 MG/DL (ref 65–140)
HCT VFR BLD AUTO: 27.9 % (ref 36.5–49.3)
HGB BLD-MCNC: 8.5 G/DL (ref 12–17)
IMM GRANULOCYTES # BLD AUTO: 0.07 THOUSAND/UL (ref 0–0.2)
IMM GRANULOCYTES NFR BLD AUTO: 1 % (ref 0–2)
LYMPHOCYTES # BLD AUTO: 0.69 THOUSANDS/ÂΜL (ref 0.6–4.47)
LYMPHOCYTES NFR BLD AUTO: 13 % (ref 14–44)
MCH RBC QN AUTO: 27.4 PG (ref 26.8–34.3)
MCHC RBC AUTO-ENTMCNC: 30.5 G/DL (ref 31.4–37.4)
MCV RBC AUTO: 90 FL (ref 82–98)
MONOCYTES # BLD AUTO: 0.68 THOUSAND/ÂΜL (ref 0.17–1.22)
MONOCYTES NFR BLD AUTO: 13 % (ref 4–12)
NEUTROPHILS # BLD AUTO: 3.93 THOUSANDS/ÂΜL (ref 1.85–7.62)
NEUTS SEG NFR BLD AUTO: 72 % (ref 43–75)
NRBC BLD AUTO-RTO: 0 /100 WBCS
NT-PROBNP SERPL-MCNC: 2569 PG/ML
PLATELET # BLD AUTO: 126 THOUSANDS/UL (ref 149–390)
PMV BLD AUTO: 9.9 FL (ref 8.9–12.7)
POTASSIUM SERPL-SCNC: 4.9 MMOL/L (ref 3.5–5.3)
PROT SERPL-MCNC: 6.7 G/DL (ref 6.4–8.4)
RBC # BLD AUTO: 3.1 MILLION/UL (ref 3.88–5.62)
SODIUM SERPL-SCNC: 142 MMOL/L (ref 135–147)
WBC # BLD AUTO: 5.46 THOUSAND/UL (ref 4.31–10.16)

## 2022-11-07 RX ORDER — IPRATROPIUM BROMIDE AND ALBUTEROL SULFATE 2.5; .5 MG/3ML; MG/3ML
3 SOLUTION RESPIRATORY (INHALATION) ONCE
Status: COMPLETED | OUTPATIENT
Start: 2022-11-07 | End: 2022-11-07

## 2022-11-07 RX ADMIN — IPRATROPIUM BROMIDE AND ALBUTEROL SULFATE 3 ML: 2.5; .5 SOLUTION RESPIRATORY (INHALATION) at 23:22

## 2022-11-08 ENCOUNTER — APPOINTMENT (OUTPATIENT)
Dept: RADIOLOGY | Facility: HOSPITAL | Age: 77
End: 2022-11-08
Attending: INTERNAL MEDICINE

## 2022-11-08 ENCOUNTER — APPOINTMENT (INPATIENT)
Dept: RADIOLOGY | Facility: HOSPITAL | Age: 77
End: 2022-11-08

## 2022-11-08 PROBLEM — R06.02 SHORTNESS OF BREATH: Status: ACTIVE | Noted: 2022-11-08

## 2022-11-08 LAB
2HR DELTA HS TROPONIN: 1 NG/L
4HR DELTA HS TROPONIN: 3 NG/L
ABO GROUP BLD: NORMAL
ANION GAP SERPL CALCULATED.3IONS-SCNC: 3 MMOL/L (ref 4–13)
APPEARANCE FLD: ABNORMAL
ATRIAL RATE: 108 BPM
BLD GP AB SCN SERPL QL: NEGATIVE
BUN SERPL-MCNC: 29 MG/DL (ref 5–25)
CALCIUM SERPL-MCNC: 8.2 MG/DL (ref 8.3–10.1)
CARDIAC TROPONIN I PNL SERPL HS: 16 NG/L
CARDIAC TROPONIN I PNL SERPL HS: 18 NG/L
CHLORIDE SERPL-SCNC: 107 MMOL/L (ref 96–108)
CO2 SERPL-SCNC: 34 MMOL/L (ref 21–32)
COLOR FLD: ABNORMAL
CREAT SERPL-MCNC: 1.68 MG/DL (ref 0.6–1.3)
ERYTHROCYTE [DISTWIDTH] IN BLOOD BY AUTOMATED COUNT: 15.5 % (ref 11.6–15.1)
FLUAV RNA RESP QL NAA+PROBE: NEGATIVE
FLUBV RNA RESP QL NAA+PROBE: NEGATIVE
GFR SERPL CREATININE-BSD FRML MDRD: 39 ML/MIN/1.73SQ M
GLUCOSE FLD-MCNC: 196 MG/DL
GLUCOSE P FAST SERPL-MCNC: 61 MG/DL (ref 65–99)
GLUCOSE SERPL-MCNC: 104 MG/DL (ref 65–140)
GLUCOSE SERPL-MCNC: 108 MG/DL (ref 65–140)
GLUCOSE SERPL-MCNC: 130 MG/DL (ref 65–140)
GLUCOSE SERPL-MCNC: 142 MG/DL (ref 65–140)
GLUCOSE SERPL-MCNC: 221 MG/DL (ref 65–140)
GLUCOSE SERPL-MCNC: 244 MG/DL (ref 65–140)
GLUCOSE SERPL-MCNC: 54 MG/DL (ref 65–140)
GLUCOSE SERPL-MCNC: 61 MG/DL (ref 65–140)
HCT VFR BLD AUTO: 26.5 % (ref 36.5–49.3)
HGB BLD-MCNC: 7.9 G/DL (ref 12–17)
INR PPP: 1.01 (ref 0.84–1.19)
LDH FLD L TO P-CCNC: 49 U/L
LYMPHOCYTES NFR BLD AUTO: 87 %
MCH RBC QN AUTO: 27.1 PG (ref 26.8–34.3)
MCHC RBC AUTO-ENTMCNC: 29.8 G/DL (ref 31.4–37.4)
MCV RBC AUTO: 91 FL (ref 82–98)
MONOCYTES NFR BLD AUTO: 12 %
NEUTS SEG NFR BLD AUTO: 1 %
PH BODY FLUID: 7.5
PLATELET # BLD AUTO: 122 THOUSANDS/UL (ref 149–390)
PMV BLD AUTO: 10.2 FL (ref 8.9–12.7)
POTASSIUM SERPL-SCNC: 4.5 MMOL/L (ref 3.5–5.3)
PROT FLD-MCNC: <2 G/DL
PROTHROMBIN TIME: 13.5 SECONDS (ref 11.6–14.5)
QRS AXIS: 129 DEGREES
QRSD INTERVAL: 124 MS
QT INTERVAL: 374 MS
QTC INTERVAL: 477 MS
RBC # BLD AUTO: 2.92 MILLION/UL (ref 3.88–5.62)
RH BLD: POSITIVE
RSV RNA RESP QL NAA+PROBE: NEGATIVE
SARS-COV-2 RNA RESP QL NAA+PROBE: NEGATIVE
SITE: ABNORMAL
SODIUM SERPL-SCNC: 144 MMOL/L (ref 135–147)
SPECIMEN EXPIRATION DATE: NORMAL
T WAVE AXIS: 57 DEGREES
TOTAL CELLS COUNTED SPEC: 100
VENTRICULAR RATE: 98 BPM
WBC # BLD AUTO: 5.64 THOUSAND/UL (ref 4.31–10.16)
WBC # FLD MANUAL: 1213 /UL

## 2022-11-08 PROCEDURE — 0W993ZZ DRAINAGE OF RIGHT PLEURAL CAVITY, PERCUTANEOUS APPROACH: ICD-10-PCS | Performed by: RADIOLOGY

## 2022-11-08 RX ORDER — GABAPENTIN 100 MG/1
100 CAPSULE ORAL 3 TIMES DAILY
Status: DISCONTINUED | OUTPATIENT
Start: 2022-11-08 | End: 2022-11-09 | Stop reason: HOSPADM

## 2022-11-08 RX ORDER — INSULIN GLARGINE 100 [IU]/ML
30 INJECTION, SOLUTION SUBCUTANEOUS
Refills: 0 | Status: DISCONTINUED | OUTPATIENT
Start: 2022-11-08 | End: 2022-11-09 | Stop reason: HOSPADM

## 2022-11-08 RX ORDER — TAMSULOSIN HYDROCHLORIDE 0.4 MG/1
0.4 CAPSULE ORAL
Status: DISCONTINUED | OUTPATIENT
Start: 2022-11-08 | End: 2022-11-09 | Stop reason: HOSPADM

## 2022-11-08 RX ORDER — ASPIRIN 81 MG/1
81 TABLET ORAL DAILY
Status: DISCONTINUED | OUTPATIENT
Start: 2022-11-08 | End: 2022-11-09 | Stop reason: HOSPADM

## 2022-11-08 RX ORDER — ACETAMINOPHEN 325 MG/1
650 TABLET ORAL EVERY 6 HOURS PRN
Status: DISCONTINUED | OUTPATIENT
Start: 2022-11-08 | End: 2022-11-09 | Stop reason: HOSPADM

## 2022-11-08 RX ORDER — FERROUS SULFATE 325(65) MG
325 TABLET ORAL
Status: DISCONTINUED | OUTPATIENT
Start: 2022-11-08 | End: 2022-11-09 | Stop reason: HOSPADM

## 2022-11-08 RX ORDER — FUROSEMIDE 40 MG/1
40 TABLET ORAL DAILY
Status: DISCONTINUED | OUTPATIENT
Start: 2022-11-08 | End: 2022-11-09 | Stop reason: HOSPADM

## 2022-11-08 RX ORDER — INSULIN LISPRO 100 [IU]/ML
2-12 INJECTION, SOLUTION INTRAVENOUS; SUBCUTANEOUS
Status: DISCONTINUED | OUTPATIENT
Start: 2022-11-08 | End: 2022-11-09 | Stop reason: HOSPADM

## 2022-11-08 RX ORDER — INSULIN LISPRO 100 [IU]/ML
1-6 INJECTION, SOLUTION INTRAVENOUS; SUBCUTANEOUS
Status: DISCONTINUED | OUTPATIENT
Start: 2022-11-08 | End: 2022-11-09 | Stop reason: HOSPADM

## 2022-11-08 RX ORDER — LIDOCAINE HYDROCHLORIDE 10 MG/ML
INJECTION, SOLUTION EPIDURAL; INFILTRATION; INTRACAUDAL; PERINEURAL CODE/TRAUMA/SEDATION MEDICATION
Status: COMPLETED | OUTPATIENT
Start: 2022-11-08 | End: 2022-11-08

## 2022-11-08 RX ORDER — ONDANSETRON 2 MG/ML
4 INJECTION INTRAMUSCULAR; INTRAVENOUS EVERY 6 HOURS PRN
Status: DISCONTINUED | OUTPATIENT
Start: 2022-11-08 | End: 2022-11-09 | Stop reason: HOSPADM

## 2022-11-08 RX ORDER — ISOSORBIDE MONONITRATE 30 MG/1
30 TABLET, EXTENDED RELEASE ORAL DAILY
Status: DISCONTINUED | OUTPATIENT
Start: 2022-11-08 | End: 2022-11-09 | Stop reason: HOSPADM

## 2022-11-08 RX ORDER — PANTOPRAZOLE SODIUM 40 MG/1
40 TABLET, DELAYED RELEASE ORAL
Status: DISCONTINUED | OUTPATIENT
Start: 2022-11-08 | End: 2022-11-09 | Stop reason: HOSPADM

## 2022-11-08 RX ORDER — PRAVASTATIN SODIUM 80 MG/1
80 TABLET ORAL
Refills: 0 | Status: DISCONTINUED | OUTPATIENT
Start: 2022-11-08 | End: 2022-11-09 | Stop reason: HOSPADM

## 2022-11-08 RX ORDER — LEVALBUTEROL 1.25 MG/.5ML
1.25 SOLUTION, CONCENTRATE RESPIRATORY (INHALATION)
Status: DISCONTINUED | OUTPATIENT
Start: 2022-11-08 | End: 2022-11-09 | Stop reason: HOSPADM

## 2022-11-08 RX ORDER — INSULIN LISPRO 100 [IU]/ML
10 INJECTION, SOLUTION INTRAVENOUS; SUBCUTANEOUS
Refills: 0 | Status: DISCONTINUED | OUTPATIENT
Start: 2022-11-08 | End: 2022-11-09 | Stop reason: HOSPADM

## 2022-11-08 RX ORDER — FUROSEMIDE 10 MG/ML
40 INJECTION INTRAMUSCULAR; INTRAVENOUS ONCE
Status: COMPLETED | OUTPATIENT
Start: 2022-11-08 | End: 2022-11-08

## 2022-11-08 RX ORDER — CYCLOBENZAPRINE HCL 10 MG
10 TABLET ORAL 2 TIMES DAILY PRN
Status: DISCONTINUED | OUTPATIENT
Start: 2022-11-08 | End: 2022-11-09 | Stop reason: HOSPADM

## 2022-11-08 RX ORDER — CARVEDILOL 6.25 MG/1
6.25 TABLET ORAL 2 TIMES DAILY WITH MEALS
Status: DISCONTINUED | OUTPATIENT
Start: 2022-11-08 | End: 2022-11-09 | Stop reason: HOSPADM

## 2022-11-08 RX ORDER — FLUTICASONE FUROATE AND VILANTEROL 100; 25 UG/1; UG/1
1 POWDER RESPIRATORY (INHALATION) DAILY
Refills: 0 | Status: DISCONTINUED | OUTPATIENT
Start: 2022-11-08 | End: 2022-11-09 | Stop reason: HOSPADM

## 2022-11-08 RX ADMIN — ASPIRIN 81 MG: 81 TABLET, COATED ORAL at 10:11

## 2022-11-08 RX ADMIN — INSULIN LISPRO 4 UNITS: 100 INJECTION, SOLUTION INTRAVENOUS; SUBCUTANEOUS at 12:17

## 2022-11-08 RX ADMIN — INSULIN LISPRO 10 UNITS: 100 INJECTION, SOLUTION INTRAVENOUS; SUBCUTANEOUS at 17:10

## 2022-11-08 RX ADMIN — LEVALBUTEROL 1.25 MG: 1.25 SOLUTION, CONCENTRATE RESPIRATORY (INHALATION) at 07:27

## 2022-11-08 RX ADMIN — TAMSULOSIN HYDROCHLORIDE 0.4 MG: 0.4 CAPSULE ORAL at 17:08

## 2022-11-08 RX ADMIN — INSULIN LISPRO 4 UNITS: 100 INJECTION, SOLUTION INTRAVENOUS; SUBCUTANEOUS at 17:10

## 2022-11-08 RX ADMIN — FERROUS SULFATE TAB 325 MG (65 MG ELEMENTAL FE) 325 MG: 325 (65 FE) TAB at 06:31

## 2022-11-08 RX ADMIN — IPRATROPIUM BROMIDE 0.5 MG: 0.5 SOLUTION RESPIRATORY (INHALATION) at 07:27

## 2022-11-08 RX ADMIN — IPRATROPIUM BROMIDE 0.5 MG: 0.5 SOLUTION RESPIRATORY (INHALATION) at 13:44

## 2022-11-08 RX ADMIN — CARVEDILOL 6.25 MG: 6.25 TABLET, FILM COATED ORAL at 17:07

## 2022-11-08 RX ADMIN — INSULIN LISPRO 10 UNITS: 100 INJECTION, SOLUTION INTRAVENOUS; SUBCUTANEOUS at 12:17

## 2022-11-08 RX ADMIN — CARVEDILOL 6.25 MG: 6.25 TABLET, FILM COATED ORAL at 06:31

## 2022-11-08 RX ADMIN — IPRATROPIUM BROMIDE 0.5 MG: 0.5 SOLUTION RESPIRATORY (INHALATION) at 19:06

## 2022-11-08 RX ADMIN — LEVALBUTEROL 1.25 MG: 1.25 SOLUTION, CONCENTRATE RESPIRATORY (INHALATION) at 19:07

## 2022-11-08 RX ADMIN — GABAPENTIN 100 MG: 100 CAPSULE ORAL at 17:07

## 2022-11-08 RX ADMIN — PANTOPRAZOLE SODIUM 40 MG: 40 TABLET, DELAYED RELEASE ORAL at 06:20

## 2022-11-08 RX ADMIN — LIDOCAINE HYDROCHLORIDE 10 ML: 10 INJECTION, SOLUTION EPIDURAL; INFILTRATION; INTRACAUDAL; PERINEURAL at 15:30

## 2022-11-08 RX ADMIN — ISOSORBIDE MONONITRATE 30 MG: 30 TABLET, EXTENDED RELEASE ORAL at 10:11

## 2022-11-08 RX ADMIN — GABAPENTIN 100 MG: 100 CAPSULE ORAL at 10:11

## 2022-11-08 RX ADMIN — FUROSEMIDE 40 MG: 40 TABLET ORAL at 10:11

## 2022-11-08 RX ADMIN — LEVALBUTEROL 1.25 MG: 1.25 SOLUTION, CONCENTRATE RESPIRATORY (INHALATION) at 13:44

## 2022-11-08 RX ADMIN — FLUTICASONE FUROATE AND VILANTEROL TRIFENATATE 1 PUFF: 100; 25 POWDER RESPIRATORY (INHALATION) at 10:11

## 2022-11-08 RX ADMIN — PRAVASTATIN SODIUM 80 MG: 80 TABLET ORAL at 17:07

## 2022-11-08 RX ADMIN — APIXABAN 5 MG: 5 TABLET, FILM COATED ORAL at 10:11

## 2022-11-08 RX ADMIN — PANTOPRAZOLE SODIUM 40 MG: 40 TABLET, DELAYED RELEASE ORAL at 17:07

## 2022-11-08 RX ADMIN — APIXABAN 5 MG: 5 TABLET, FILM COATED ORAL at 17:07

## 2022-11-08 RX ADMIN — GABAPENTIN 100 MG: 100 CAPSULE ORAL at 20:27

## 2022-11-08 RX ADMIN — INSULIN GLARGINE 30 UNITS: 100 INJECTION, SOLUTION SUBCUTANEOUS at 01:01

## 2022-11-08 RX ADMIN — FUROSEMIDE 40 MG: 10 INJECTION, SOLUTION INTRAMUSCULAR; INTRAVENOUS at 20:27

## 2022-11-08 RX ADMIN — INSULIN GLARGINE 30 UNITS: 100 INJECTION, SOLUTION SUBCUTANEOUS at 22:00

## 2022-11-08 NOTE — ASSESSMENT & PLAN NOTE
Lab Results   Component Value Date    HGBA1C 9 7 (H) 10/10/2022       Recent Labs     11/08/22  0056   POCGLU 108       Blood Sugar Average: Last 72 hrs:  (P) 108   · Continue home Lantus 30 units q h s  and Humalog 10 units t i d  with meals  · Add correctional insulin with Accu-Cheks and carb controlled diet  · Initiate hypoglycemia protocol

## 2022-11-08 NOTE — ED PROVIDER NOTES
History  Chief Complaint   Patient presents with   • Shortness of Breath     Pt c/o increased SOB and feeling unwell for past week  Pt c/o chest tighness, and body shakes  Bella Silveira Is a 78year-old man with medical history significant for CHF, COPD, diabetes, coronary artery disease post stenting, presenting with shortness of breath  He was discharged from the hospital 5 days ago after being admitted for GI bleed, dyspnea, pneumonia  Since time of discharge, his dyspnea has been worsening  Today worsened significantly  He has some left-sided chest pain which started today  It does not radiate  It is not worse with ambulation  He reports compliance with all of his medications including his Eliquis and his home oxygen  Chart review shows however that he is frequently noncompliant with his medications  Does not believe that his lower extremity is more edematous than usual   He reports no recent weight gain  He has a cough which is productive  He had 1 bloody bowel movement when he returned from the hospital, reports no other bloody stools or melena  No fevers, chills, nausea, vomiting, history of blood clot, unilateral lower extremity edema or pain, hemoptysis  Prior to Admission Medications   Prescriptions Last Dose Informant Patient Reported? Taking? Alcohol Swabs 70 % PADS   No No   Sig: May substitute brand based on insurance coverage  Check glucose TID  Blood Glucose Monitoring Suppl (OneTouch Verio Reflect) w/Device KIT   No No   Sig: May substitute brand based on insurance coverage  Check glucose TID  Insulin Glargine Solostar (Lantus SoloStar) 100 UNIT/ML SOPN   No No   Sig: Inject 0 3 mL (30 Units total) under the skin daily at bedtime   Insulin Pen Needle (BD Pen Needle Beena 2nd Gen) 32G X 4 MM MISC   No No   Sig: For use with insulin pen  Pharmacy may dispense brand covered by insurance     Insulin Pen Needle (BD Pen Needle Beena 2nd Gen) 32G X 4 MM MISC   No No Sig: For use with insulin pen  Pharmacy may dispense brand covered by insurance  OneTouch Delica Lancets 90L MISC   No No   Sig: May substitute brand based on insurance coverage  Check glucose TID  apixaban (ELIQUIS) 5 mg   No No   Sig: Take 1 tablet (5 mg total) by mouth 2 (two) times a day   aspirin (ECOTRIN LOW STRENGTH) 81 mg EC tablet   No No   Sig: Take 1 tablet (81 mg total) by mouth daily   carvedilol (COREG) 6 25 mg tablet   No No   Sig: Take 1 tablet (6 25 mg total) by mouth 2 (two) times a day with meals   cyclobenzaprine (FLEXERIL) 10 mg tablet   No No   Sig: Take 1 tablet (10 mg total) by mouth 2 (two) times a day as needed for muscle spasms   ferrous sulfate 325 (65 Fe) mg tablet   No No   Sig: Take 1 tablet (325 mg total) by mouth daily with breakfast   fluticasone-vilanterol (BREO ELLIPTA) 100-25 mcg/inh inhaler   No No   Sig: Inhale 1 puff daily Rinse mouth after use  furosemide (LASIX) 40 mg tablet   No No   Sig: Take 1 tablet (40 mg total) by mouth daily   gabapentin (NEURONTIN) 100 mg capsule   No No   Sig: Take 1 capsule (100 mg total) by mouth 3 (three) times a day   glucose blood (OneTouch Verio) test strip   No No   Sig: May substitute brand based on insurance coverage  Check glucose TID  glucose blood test strip   No No   Sig: Use 1 each daily as needed (check sugars) Use as instructed   Uses One Touch Ultra test strip   insulin lispro (HumaLOG KwikPen) 100 units/mL injection pen   No No   Sig: Inject 10 Units under the skin 3 (three) times a day with meals   isosorbide mononitrate (IMDUR) 30 mg 24 hr tablet   No No   Sig: Take 1 tablet (30 mg total) by mouth daily   lidocaine (Lidoderm) 5 %   No No   Sig: Apply 1 patch topically daily for 6 days Remove & Discard patch within 12 hours or as directed by MD   pantoprazole (PROTONIX) 40 mg tablet   No No   Sig: Take 1 tablet (40 mg total) by mouth 2 (two) times a day before meals   simvastatin (ZOCOR) 40 mg tablet   No No   Sig: Take 1 tablet (40 mg total) by mouth daily   tamsulosin (FLOMAX) 0 4 mg   No No   Sig: Take 1 capsule (0 4 mg total) by mouth daily with dinner   umeclidinium bromide (INCRUSE ELLIPTA) 62 5 mcg/inh AEPB inhaler   No No   Sig: Inhale 1 puff daily Do not start before October 16, 2022  Facility-Administered Medications: None       Past Medical History:   Diagnosis Date   • Abdominal pain    • MARANDA (acute kidney injury) ([facility] ) 6/19/2018   • Cardiac disease    • CHF (congestive heart failure) (HCC)    • COPD, severe (HCC)    • Coronary artery disease    • DDD (degenerative disc disease), lumbar 9/1/2020   • Diabetes mellitus (HCC)    • Dyspnea    • GERD (gastroesophageal reflux disease)    • Hyperlipidemia    • Hypertension    • MI (myocardial infarction) ([facility] )     with 3 stents   • Nodule of apex of right lung    • JACQUELINE (obstructive sleep apnea)    • Prostate cancer [facility])        Past Surgical History:   Procedure Laterality Date   • ABDOMINAL SURGERY      exploratory   • ANGIOPLASTY      3 stents   • APPENDECTOMY     • COLONOSCOPY  2015   • CT NEEDLE BIOPSY LUNG  11/3/2020   • ESOPHAGOGASTRODUODENOSCOPY N/A 10/2/2017    Procedure: ESOPHAGOGASTRODUODENOSCOPY (EGD); Surgeon: Jenny Gomez MD;  Location: [facility]; Service: Gastroenterology   • IR THORACENTESIS  11/3/2020   • KNEE CARTILAGE SURGERY     • OTHER SURGICAL HISTORY      stent placement   • PROSTATE SURGERY     • SKIN GRAFT      Basal cell CA back       Family History   Problem Relation Age of Onset   • Heart disease Father    • Other Father         Mesothelioma      I have reviewed and agree with the history as documented      E-Cigarette/Vaping   • E-Cigarette Use Never User      E-Cigarette/Vaping Substances   • Nicotine No    • THC No    • CBD No    • Flavoring No    • Other No    • Unknown No      Social History     Tobacco Use   • Smoking status: Former Smoker     Packs/day: 1 50     Years: 50 00     Pack years: 75 00     Start date: 38     Quit date: 2020     Years since quittin 3   • Smokeless tobacco: Never Used   Vaping Use   • Vaping Use: Never used   Substance Use Topics   • Alcohol use: Never   • Drug use: No        Review of Systems   All other systems reviewed and are negative  Physical Exam  ED Triage Vitals   Temperature Pulse Respirations Blood Pressure SpO2   22 2234 22 2234 22 2236 22 2236 22 2234   97 9 °F (36 6 °C) 95 20 (!) 184/78 94 %      Temp Source Heart Rate Source Patient Position - Orthostatic VS BP Location FiO2 (%)   22 22322 22322 22322 --   Oral Monitor Sitting Left arm       Pain Score       22       No Pain             Orthostatic Vital Signs  Vitals:    22 2236 22 2330 22 0000 22 0100   BP: (!) 184/78      Pulse: 86 84 82 78   Patient Position - Orthostatic VS: Sitting          Physical Exam  Vitals and nursing note reviewed  Constitutional:       General: He is not in acute distress  Appearance: He is well-developed  He is ill-appearing  He is not toxic-appearing  HENT:      Head: Normocephalic and atraumatic  Right Ear: External ear normal       Left Ear: External ear normal       Nose: Nose normal       Mouth/Throat:      Mouth: Mucous membranes are moist    Eyes:      Conjunctiva/sclera: Conjunctivae normal    Cardiovascular:      Rate and Rhythm: Normal rate and regular rhythm  Pulmonary:      Effort: Pulmonary effort is normal       Breath sounds: Normal breath sounds  Comments: Diminished breath sounds in all lung fields  Scant rales at lung bases  No rhonchi present  Patient is not in respiratory distress  On home 2 L of oxygen  Abdominal:      General: There is no distension  Palpations: Abdomen is soft  Tenderness: There is no abdominal tenderness  Musculoskeletal:         General: No deformity  Cervical back: Normal range of motion  Right lower leg: No tenderness  Edema present  Left lower leg: No tenderness  Edema present  Skin:     General: Skin is warm and dry  Neurological:      General: No focal deficit present  Mental Status: He is alert and oriented to person, place, and time           ED Medications  Medications   apixaban (ELIQUIS) tablet 5 mg (has no administration in time range)   aspirin (ECOTRIN LOW STRENGTH) EC tablet 81 mg (has no administration in time range)   carvedilol (COREG) tablet 6 25 mg (has no administration in time range)   cyclobenzaprine (FLEXERIL) tablet 10 mg (has no administration in time range)   ferrous sulfate tablet 325 mg (has no administration in time range)   Fluticasone Furoate-Vilanterol 100-25 mcg/actuation 1 puff (has no administration in time range)   furosemide (LASIX) tablet 40 mg (has no administration in time range)   gabapentin (NEURONTIN) capsule 100 mg (has no administration in time range)   insulin glargine (LANTUS) subcutaneous injection 30 Units 0 3 mL (30 Units Subcutaneous Given 11/8/22 0101)   insulin lispro (HumaLOG) 100 units/mL subcutaneous injection 10 Units (has no administration in time range)   pantoprazole (PROTONIX) EC tablet 40 mg (has no administration in time range)   isosorbide mononitrate (IMDUR) 24 hr tablet 30 mg (has no administration in time range)   pravastatin (PRAVACHOL) tablet 80 mg (has no administration in time range)   umeclidinium 62 5 mcg/actuation inhaler AEPB 1 puff (has no administration in time range)   tamsulosin (FLOMAX) capsule 0 4 mg (has no administration in time range)   ondansetron (ZOFRAN) injection 4 mg (has no administration in time range)   acetaminophen (TYLENOL) tablet 650 mg (has no administration in time range)   insulin lispro (HumaLOG) 100 units/mL subcutaneous injection 2-12 Units (has no administration in time range)   insulin lispro (HumaLOG) 100 units/mL subcutaneous injection 1-6 Units (1 Units Subcutaneous Not Given 11/8/22 0057)   ipratropium-albuterol (DUO-NEB) 0 5-2 5 mg/3 mL inhalation solution 3 mL (3 mL Nebulization Given 11/7/22 2322)       Diagnostic Studies  Results Reviewed     Procedure Component Value Units Date/Time    HS Troponin I 2hr [[id number]] Collected: 11/08/22 0108    Lab Status: No result Specimen: Blood from Arm, Left     Fingerstick Glucose (POCT) [[id number]]  (Normal) Collected: 11/08/22 0056    Lab Status: Final result Updated: 11/08/22 0057     POC Glucose 108 mg/dl     FLU/RSV/COVID - if FLU/RSV clinically relevant [[id number]]  (Normal) Collected: 11/07/22 2322    Lab Status: Final result Specimen: Nares from Nose Updated: 11/08/22 0007     SARS-CoV-2 Negative     INFLUENZA A PCR Negative     INFLUENZA B PCR Negative     RSV PCR Negative    Narrative:      FOR PEDIATRIC PATIENTS - copy/paste COVID Guidelines URL to browser: [email or web address]  [email or web address]    SARS-CoV-2 assay is a Nucleic Acid Amplification assay intended for the  qualitative detection of nucleic acid from SARS-CoV-2 in nasopharyngeal  swabs  Results are for the presumptive identification of SARS-CoV-2 RNA  Positive results are indicative of infection with SARS-CoV-2, the virus  causing COVID-19, but do not rule out bacterial infection or co-infection  with other viruses  Laboratories within the [place] and its  territories are required to report all positive results to the appropriate  public health authorities  Negative results do not preclude SARS-CoV-2  infection and should not be used as the sole basis for treatment or other  patient management decisions  Negative results must be combined with  clinical observations, patient history, and epidemiological information  This test has not been FDA cleared or approved  This test has been authorized by FDA under an Emergency Use Authorization  (EUA)   This test is only authorized for the duration of time the  declaration that circumstances exist justifying the authorization of the  emergency use of an in vitro diagnostic tests for detection of SARS-CoV-2  virus and/or diagnosis of COVID-19 infection under section 564(b)(1) of  the Act, 21 U  S C  371FVC-7(H)(1), unless the authorization is terminated  or revoked sooner  The test has been validated but independent review by FDA  and CLIA is pending  Test performed using [employer] GeneXpert: This RT-PCR assay targets N2,  a region unique to SARS-CoV-2  A conserved region in the E-gene was chosen  for pan-Sarbecovirus detection which includes SARS-CoV-2  According to CMS-2020-01-R, this platform meets the definition of high-throughput technology      HS Troponin I 4hr [[id number]]     Lab Status: No result Specimen: Blood     HS Troponin 0hr (reflex protocol) [[id number]]  (Normal) Collected: 11/07/22 2310    Lab Status: Final result Specimen: Blood from Arm, Left Updated: 11/07/22 2359     hs TnI 0hr 15 ng/L     Comprehensive metabolic panel [[id number]]  (Abnormal) Collected: 11/07/22 2310    Lab Status: Final result Specimen: Blood from Arm, Left Updated: 11/07/22 2358     Sodium 142 mmol/L      Potassium 4 9 mmol/L      Chloride 107 mmol/L      CO2 34 mmol/L      ANION GAP 1 mmol/L      BUN 30 mg/dL      Creatinine 1 93 mg/dL      Glucose 160 mg/dL      Calcium 8 3 mg/dL      Corrected Calcium 9 2 mg/dL      AST 10 U/L      ALT 19 U/L      Alkaline Phosphatase 77 U/L      Total Protein 6 7 g/dL      Albumin 2 9 g/dL      Total Bilirubin 0 28 mg/dL      eGFR 33 ml/min/1 73sq m     Narrative:      Shahrzad guidelines for Chronic Kidney Disease (CKD):   •  Stage 1 with normal or high GFR (GFR > 90 mL/min/1 73 square meters)  •  Stage 2 Mild CKD (GFR = 60-89 mL/min/1 73 square meters)  •  Stage 3A Moderate CKD (GFR = 45-59 mL/min/1 73 square meters)  •  Stage 3B Moderate CKD (GFR = 30-44 mL/min/1 73 square meters)  •  Stage 4 Severe CKD (GFR = 15-29 mL/min/1 73 square meters)  •  Stage 5 End Stage CKD (GFR <15 mL/min/1 73 square meters)  Note: GFR calculation is accurate only with a steady state creatinine    NT-BNP PRO [[id number]]  (Abnormal) Collected: 11/07/22 2310    Lab Status: Final result Specimen: Blood from Arm, Left Updated: 11/07/22 2358     NT-proBNP 2,569 pg/mL     CBC and differential [[id number]]  (Abnormal) Collected: 11/07/22 2310    Lab Status: Final result Specimen: Blood from Arm, Left Updated: 11/07/22 2319     WBC 5 46 Thousand/uL      RBC 3 10 Million/uL      Hemoglobin 8 5 g/dL      Hematocrit 27 9 %      MCV 90 fL      MCH 27 4 pg      MCHC 30 5 g/dL      RDW 15 4 %      MPV 9 9 fL      Platelets 967 Thousands/uL      nRBC 0 /100 WBCs      Neutrophils Relative 72 %      Immat GRANS % 1 %      Lymphocytes Relative 13 %      Monocytes Relative 13 %      Eosinophils Relative 1 %      Basophils Relative 0 %      Neutrophils Absolute 3 93 Thousands/µL      Immature Grans Absolute 0 07 Thousand/uL      Lymphocytes Absolute 0 69 Thousands/µL      Monocytes Absolute 0 68 Thousand/µL      Eosinophils Absolute 0 07 Thousand/µL      Basophils Absolute 0 02 Thousands/µL                  X-ray chest 2 views   ED Interpretation by Kulwinder Lang MD (11/07 2357)   Right pleural effusion present on prior CXR in late Oct 2022  IR IN-Patient Thoracentesis    (Results Pending)         Procedures  Procedures      ED Course  ED Course as of 11/08/22 0110   Mon Nov 07, 2022 2319 Hemoglobin(!): 8 5   2359 hs TnI 0hr: 15                             SBIRT 20yo+    Flowsheet Row Most Recent Value   SBIRT (23 yo +)    In order to provide better care to our patients, we are screening all of our patients for alcohol and drug use  Would it be okay to ask you these screening questions?  Unable to answer at this time Filed at: 11/07/2022 2239                MDM  Number of Diagnoses or Management Options  Dyspnea  Recurrent right pleural effusion  Diagnosis management comments: 68-year-old man presenting with dyspnea  Concerning for CHF exacerbation, pneumonia, COPD exacerbation, significant anemia  Will evaluate with labs, chest x-ray  Will give DuoNeb for symptoms  Work up shows persistent right sided pleural effusion  Pt reports improved symptoms in the ED  Admitted to Medicine for observation due to persistent right pleural effusion  Disposition  Final diagnoses:   Dyspnea   Recurrent right pleural effusion     Time reflects when diagnosis was documented in both MDM as applicable and the Disposition within this note     Time User Action Codes Description Comment    11/8/2022 12:22 AM Salvatore Leonard [R06 [id number]] Dyspnea     11/8/2022 12:23 AM Salvatore Hernandes Add [J90] Recurrent right pleural effusion     11/8/2022 12:46 AM Darian Patel Add [Z78 [id number]] Frequent hospital admissions       ED Disposition     ED Disposition   Admit    Condition   Stable    Date/Time   Tue Nov 8, 2022 12:23 AM    Comment              Follow-up Information    None         Patient's Medications   Discharge Prescriptions    No medications on file     No discharge procedures on file  PDMP Review       Value Time User    PDMP Reviewed  Yes 11/8/2022 12:43 AM Alissa Kaplan DO           ED Provider  Attending physically available and evaluated Catalina Severe Sr    I managed the patient along with the ED Attending      Electronically Signed by         Edyta Hernandez MD  11/08/22 0110

## 2022-11-08 NOTE — ASSESSMENT & PLAN NOTE
· Additionally recent hospitalization with COPD exacerbation; slight wheezing on examination but no significant change in cough per patient  · On 2 L NC at home continuously, currently at baseline  · Did note some response to [facility] during ED evaluation, will continue as needed  · Continue Incruse and Breo Ellipta  · Needs pulmonology follow-up as outpatient

## 2022-11-08 NOTE — SEDATION DOCUMENTATION
Right thoracentesis completed by Giuliano Puentes  770 of serosanguinous fluid removed, specimens sent to lab  Biopsy Type: H and E Render Post-Care Instructions In Note?: no Anesthesia Volume In Cc: 0.5 Bill As?: Biopsy by Shave Method Notification Instructions: Patient will be notified of biopsy results. However, patient instructed to call the office if not contacted within 2 weeks. Size Of Lesion After Curettage: 0 Anesthesia Type: 1% lidocaine with 1:100,000 epinephrine and a 1:10 solution of 8.4% sodium bicarbonate Billing Type: Third-Party Bill Post-Care Instructions: I reviewed with the patient in detail post-care instructions. Patient is to keep the biopsy site dry overnight, and then apply bacitracin twice daily until healed. Patient may apply hydrogen peroxide soaks to remove any crusting. Consent: Written consent was obtained and risks were reviewed including but not limited to scarring, infection, bleeding, scabbing, incomplete removal, nerve damage and allergy to anesthesia. Hemostasis: Drysol Destruction Type: electrocautery (heat cautery) Detail Level: Detailed Number Of Curettages: 3 Wound Care: Vaseline

## 2022-11-08 NOTE — ASSESSMENT & PLAN NOTE
· Patient with history of lung adenocarcinoma s/p SPRT complicated by recurrent right pleural effusions; most recent thoracentesis 04/2021 with lymphocytic and exudative fluid  · X-ray completed during ED evaluation questionably with increased from prior, will follow-up final read  Tentatively will request IR evaluation for potential repeat thoracentesis given ongoing shortness of breath

## 2022-11-08 NOTE — ASSESSMENT & PLAN NOTE
· Hemoglobin stable at recent baseline, monitor with CBC  · Had fairly recent endoscopy revealing PUD, unfortunately with recurrent bleeding at prior admission seen by GI however repeat EGD held off at that time  · Continue PPI

## 2022-11-08 NOTE — ASSESSMENT & PLAN NOTE
· Presented with worsening shortness of breath x1 day  Patient with multiple medical issues as below and multiple hospitalizations with shortness of breath including most recently 10/26/2022-11/02/2022 unfortunately complicated by acute on chronic hypoxic/hypercapnic respiratory failure requiring intubation and ICU admission  · Briefly required up to 4 L NC during ED evaluation but is now back on baseline 2 L NC, improved s/p DuoNeb provided during ED evaluation      · Possible mild/residual COPD exacerbation from prior as etiology versus increase in size of right pleural effusion, management of these as per associated problems  · Continue supplemental oxygen as needed to maintain SaO2 greater than 88%  · Incentive spirometry, pulmonary toileting

## 2022-11-08 NOTE — DISCHARGE INSTRUCTIONS
Thoracentesis   WHAT YOU NEED TO KNOW:   A thoracentesis is a procedure to remove extra fluid or air from between your lungs and your inner chest wall  Air or fluid buildup may make it hard for you to breathe  A thoracentesis allows your lungs to expand fully so you can breathe more easily  DISCHARGE INSTRUCTIONS:   Medicines:   Pain medicine: You may be given a prescription medicine to decrease pain  Do not wait until the pain is severe before you take your medicine  Antibiotics: This medicine helps fight or prevent an infection  Take your medicine as directed  Call your healthcare provider if you think your medicine is not helping or if you have side effects  Tell him if you are allergic to any medicine  Keep a list of the medicines, vitamins, and herbs you take  Include the amounts, and when and why you take them  Bring the list or the pill bottles to follow-up visits  Carry your medicine list with you in case of an emergency  Follow up with your healthcare provider as directed:  Write down your questions so you remember to ask them during your visits  Rest:  Rest when you feel it is needed  Slowly start to do more each day  Return to your daily activities as directed  Do not smoke: If you smoke, it is never too late to quit  Ask for information about how to stop smoking if you need help  Contact your healthcare provider if:   You have a fever  Your puncture site is red, warm, swollen, or draining pus  You have questions or concerns about your procedure, medicine, or care  If you have any questions regarding, call the IR department @ [phone]  Seek care immediately or call [phone] if:   Blood soaks through your bandage  There is blood in your spit

## 2022-11-08 NOTE — ASSESSMENT & PLAN NOTE
· Currently in sinus rhythm on Coreg 6 25 mg b i d , continue  · Continue anticoagulation with Eliquis

## 2022-11-08 NOTE — QUICK NOTE
SLIM post admission check  Pt seen post thoracentesis, 770ml removed, fluid studies are pending  Reports that his breathing is still not baseline  Continues to have some RLE crackles  Will repeat CXR, AM CBC, BMP, procal   Recheck weights to compare to discharge weight from last hospitalization, may need more diuresis  Will need greater then 2 midnights to evaluate and treat his shortness of breath  Attempted to call his son at this mobile number to update, this is apparently a wrong number

## 2022-11-08 NOTE — H&P
[facility]  H&P- Nikki Rape Sr  1945, 78 y o  male MRN: [id number]  Unit/Bed#: ED 23 Encounter: [id number]  Primary Care Provider: Soraya Leonardo MD   Date and time admitted to hospital: 11/7/2022 10:30 PM    * Shortness of breath  Assessment & Plan  · Presented with worsening shortness of breath x1 day  Patient with multiple medical issues as below and multiple hospitalizations with shortness of breath including most recently 10/26/2022-11/02/2022 unfortunately complicated by acute on chronic hypoxic/hypercapnic respiratory failure requiring intubation and ICU admission  · Briefly required up to 4 L NC during ED evaluation but is now back on baseline 2 L NC, improved s/p DuoNeb provided during ED evaluation  · Possible mild/residual COPD exacerbation from prior as etiology versus increase in size of right pleural effusion, management of these as per associated problems  · Continue supplemental oxygen as needed to maintain SaO2 greater than 88%  · Incentive spirometry, pulmonary toileting    Pleural effusion on right  Assessment & Plan  · Patient with history of lung adenocarcinoma s/p SPRT complicated by recurrent right pleural effusions; most recent thoracentesis 04/2021 with lymphocytic and exudative fluid  · X-ray completed during ED evaluation questionably with increased from prior, will follow-up final read  Tentatively will request IR evaluation for potential repeat thoracentesis given ongoing shortness of breath      COPD (chronic obstructive pulmonary disease) (HCC)  Assessment & Plan  · Additionally recent hospitalization with COPD exacerbation; slight wheezing on examination but no significant change in cough per patient  · On 2 L NC at home continuously, currently at baseline  · Did note some response to [facility] during ED evaluation, will continue as needed  · Continue Incruse and Breo Ellipta  · Needs pulmonology follow-up as outpatient    Frequent hospital admissions  Assessment & Plan  · Patient with at least 16 hospitalizations in 2002, frequently with recurrent chest pain or shortness of breath often secondary to either COPD exacerbation or acute on chronic diastolic heart failure  · Concerns at prior admissions and other admissions regarding patient's compliance with oxygen therapy and medications; he does state to me on admission that he has been compliant with oxygen/medications but when questioned previously other admissions has admitted to other providers that he has not been fully compliant  · Unfortunately also with difficult social situation:  Patient noted at prior admission to not have phone and despite attempts to set up patient with visiting nurses/diarrhea/social workers unable to reach patient or son and often discharged from service  Unfortunately appears son has not been greatly involved with cares and has been difficult to reach  Additionally patient has refused long-term care placement and continues to do so    · Will consult cm again, however patient unfortunately will remain high risk for readmission given ongoing poor social situation and multiple medical comorbidities    A-fib ([facility] )  Assessment & Plan  · Currently in sinus rhythm on Coreg 6 25 mg b i d , continue  · Continue anticoagulation with Eliquis    Stage 3b chronic kidney disease (CKD) ([facility] )  Assessment & Plan  Lab Results   Component Value Date    EGFR 33 11/07/2022    EGFR 29 11/02/2022    EGFR 32 11/01/2022    CREATININE 1 93 (H) 11/07/2022    CREATININE 2 10 (H) 11/02/2022    CREATININE 1 96 (H) 11/01/2022   · Creatinine at recent baseline, monitor with BMP    Anemia, iron deficiency  Assessment & Plan  · Hemoglobin stable at recent baseline, monitor with CBC  · Had fairly recent endoscopy revealing PUD, unfortunately with recurrent bleeding at prior admission seen by GI however repeat EGD held off at that time  · Continue PPI    Chronic diastolic heart failure [facility])  Assessment & Plan  Wt Readings from Last 3 Encounters:   11/01/22 97 4 kg (214 lb 11 7 oz)   10/23/22 95 8 kg (211 lb 3 2 oz)   10/15/22 98 7 kg (217 lb 11 1 oz)     · Possible increase in size of right pleural effusion, however patient otherwise does not appear grossly volume overloaded (does have chronic lymphedema) and actually reports weight loss  · Continue home diuretic therapy and cardiac medications  · Monitor volume status closely        Type 2 diabetes mellitus with hyperglycemia, with long-term current use of insulin ([facility])  Assessment & Plan  Lab Results   Component Value Date    HGBA1C 9 7 (H) 10/10/2022       Recent Labs     11/08/22  0056   POCGLU 108       Blood Sugar Average: Last 72 hrs:  (P) 108   · Continue home Lantus 30 units q h s  and Humalog 10 units t i d  with meals  · Add correctional insulin with Accu-Cheks and carb controlled diet  · Initiate hypoglycemia protocol      VTE Prophylaxis: Apixaban (Eliquis)  / sequential compression device   Code Status: Level 1 - Full Code  POLST: POLST form is not discussed and not completed at this time  Discussion with family:  Attempted to reach son via telephone but unable to reach    Anticipated Length of Stay:  Patient will be admitted on an Observation basis with an anticipated length of stay of less than 2 midnights  Justification for Hospital Stay: Please see detailed plans noted above  Chief Complaint:     Shortness of breath  History of Present Illness:  Ramana Nichols Sr  is a 78 y o  male who presented for evaluation of recurrent shortness of breath    Patient has a complicated extensive past medical history as noted below, additionally with multiple hospitalizations including at least 15 in 2022 frequently with chest pain, hypo/hyperglycemia, and/or exacerbations of COPD or CHF, including most recently 10/26/2022-11/02/2022 with acute on chronic hypoxic/hypercapnic respiratory failure unfortunately with worsening of mental status during ED evaluation requiring intubation and ICU admission  Patient states to me that he initially felt he was doing well after return home; he did note 1 episode of bloody bowel movement immediately on return home but noted no further episodes  Unfortunately he began to develop slight recurrence of his dyspnea on exertion, and on the date of presentation he noted marked worsening of shortness of breath even at rest along with substernal chest pressure without further radiation or nausea/vomiting/diaphoresis for which to contact EMS for evaluation  Noted cough somewhat productive of whitish sputum but not more so than usual, but denies any fevers/chills or known sick contacts  Insists to me he has been compliant with oxygen and all home medications  During ED evaluation he was noted hypoxic briefly requiring increase in oxygen to 4 L NC but received DuoNeb with improvement in respiratory status and was subsequently titrate down to his home 2 L NC  Given situation he is admitted for further management  Of note regarding his multiple hospitalizations, multiple referrals have been made previously to social work/[facility]/therapists however patient apparently would not respond to messages thus he will be discharged from the services; appears at last admission it was found that he did not on the phone  Additionally patient lives alone and he was noted at prior admission that he appears to have limited support from son; long-term care placement has been advised previously however patient persistently refuses this option  Currently, patient is lying in bed in no acute distress and comfortable appearing, noting some residual shortness of breath and not feeling quite at baseline albeit improved from initial presentation  Offers no other acute complaints at this time  Again states he does not want long-term care placement at this time      Review of Systems:    Constitutional:  Denies fever or chills   Eyes: Denies change in visual acuity   HENT:  Denies nasal congestion or sore throat   Respiratory:  Denies cough but reports shortness of breath  Cardiovascular:  Denies edema but previously reported chest pain  GI:  Denies abdominal pain, nausea, vomiting, bloody stools or diarrhea   :  Denies dysuria   Musculoskeletal:  Denies back pain or joint pain   Integument:  Denies rash   Neurologic:  Denies headache, focal weakness or sensory changes   Endocrine:  Denies polyuria or polydipsia   Lymphatic:  Denies swollen glands   Psychiatric:  Denies depression or anxiety     Past Medical and Surgical History:   Past Medical History:   Diagnosis Date   • Abdominal pain    • MARANDA (acute kidney injury) ([place] [id number] ) 6/19/2018   • Cardiac disease    • CHF (congestive heart failure) ([facility])    • COPD, severe (HCC)    • Coronary artery disease    • DDD (degenerative disc disease), lumbar 9/1/2020   • Diabetes mellitus ([place] [address] )    • Dyspnea    • GERD (gastroesophageal reflux disease)    • Hyperlipidemia    • Hypertension    • MI (myocardial infarction) ([place] [address] )     with 3 stents   • Nodule of apex of right lung    • JACQUELINE (obstructive sleep apnea)    • Prostate cancer [facility])      Past Surgical History:   Procedure Laterality Date   • ABDOMINAL SURGERY      exploratory   • ANGIOPLASTY      3 stents   • APPENDECTOMY     • COLONOSCOPY  2015   • CT NEEDLE BIOPSY LUNG  11/3/2020   • ESOPHAGOGASTRODUODENOSCOPY N/A 10/2/2017    Procedure: ESOPHAGOGASTRODUODENOSCOPY (EGD); Surgeon: Mallika Holloway MD;  Location: [facility];   Service: Gastroenterology   • IR THORACENTESIS  11/3/2020   • KNEE CARTILAGE SURGERY     • OTHER SURGICAL HISTORY      stent placement   • PROSTATE SURGERY     • SKIN GRAFT      Basal cell CA back       Meds/Allergies:    Current Facility-Administered Medications:   •  acetaminophen (TYLENOL) tablet 650 mg, 650 mg, Oral, Q6H PRN, Bella Locks, DO  •  apixaban (ELIQUIS) tablet 5 mg, 5 mg, Oral, BID, Bella Locks, DO  •  aspirin (Ukiaheric Helme LOW STRENGTH) EC tablet 81 mg, 81 mg, Oral, Daily, Deepa Larsen, DO  •  carvedilol (COREG) tablet 6 25 mg, 6 25 mg, Oral, BID With Meals, Deepa Larsen, DO  •  cyclobenzaprine (FLEXERIL) tablet 10 mg, 10 mg, Oral, BID PRN, Deepa Larsen, DO  •  ferrous sulfate tablet 325 mg, 325 mg, Oral, Daily With Breakfast, Deepa Larsen, DO  •  Fluticasone Furoate-Vilanterol 100-25 mcg/actuation 1 puff, 1 puff, Inhalation, Daily, Deepa Larsen, DO  •  furosemide (LASIX) tablet 40 mg, 40 mg, Oral, Daily, Deepa Larsen, DO  •  gabapentin (NEURONTIN) capsule 100 mg, 100 mg, Oral, TID, Deepa Larsen, DO  •  insulin glargine (LANTUS) subcutaneous injection 30 Units 0 3 mL, 30 Units, Subcutaneous, HS, Deepa Larsen, DO, 30 Units at 11/08/22 0101  •  insulin lispro (HumaLOG) 100 units/mL subcutaneous injection 1-6 Units, 1-6 Units, Subcutaneous, HS, Deepa Larsen, DO  •  insulin lispro (HumaLOG) 100 units/mL subcutaneous injection 10 Units, 10 Units, Subcutaneous, TID With Meals, Deepa Larsen, DO  •  insulin lispro (HumaLOG) 100 units/mL subcutaneous injection 2-12 Units, 2-12 Units, Subcutaneous, TID AC **AND** Fingerstick Glucose (POCT), , , TID AC, Deepa Larsen, DO  •  ipratropium (ATROVENT) 0 02 % inhalation solution 0 5 mg, 0 5 mg, Nebulization, TID, Deepa Larsen, DO  •  isosorbide mononitrate (IMDUR) 24 hr tablet 30 mg, 30 mg, Oral, Daily, Deepa Larsen, DO  •  levalbuterol (XOPENEX) inhalation solution 1 25 mg, 1 25 mg, Nebulization, TID, Deepa Larsen, DO  •  ondansetron (ZOFRAN) injection 4 mg, 4 mg, Intravenous, Q6H PRN, Deepa Larsen, DO  •  pantoprazole (PROTONIX) EC tablet 40 mg, 40 mg, Oral, BID AC, Deepa Hylan, DO  •  pravastatin (PRAVACHOL) tablet 80 mg, 80 mg, Oral, Daily With Dinner, Deepa Larsen DO  •  tamsulosin (FLOMAX) capsule 0 4 mg, 0 4 mg, Oral, Daily With Dinner, Deepa Larsen DO    Allergies:    Allergies   Allergen Reactions   • Crestor [Rosuvastatin] Other (See Comments) Unknown     • Lisinopril GI Intolerance, Dizziness and Abdominal Pain   • Metformin GI Intolerance     History:  Marital Status:      Substance Use History:   Social History     Substance and Sexual Activity   Alcohol Use Never     Social History     Tobacco Use   Smoking Status Former Smoker   • Packs/day: 1 50   • Years: 50 00   • Pack years: 75 00   • Start date: 38   • Quit date: 2020   • Years since quittin 3   Smokeless Tobacco Never Used     Social History     Substance and Sexual Activity   Drug Use No       Family History:  Family History   Problem Relation Age of Onset   • Heart disease Father    • Other Father         Mesothelioma        Physical Exam:     Vitals:   Blood Pressure: (!) 184/78 (22)  Pulse: 78 (22)  Temperature: 97 9 °F (36 6 °C) (22)  Temp Source: Oral (22)  Respirations: 22 (22)  SpO2: 99 % (22)    Constitutional:  Well developed, well nourished, no acute distress, non-toxic but disheveled appearance   Eyes:  PERRL, conjunctiva normal   HENT:  Atraumatic, external ears normal, nose normal, oropharynx moist, no pharyngeal exudates  Neck- normal range of motion, no tenderness, supple   Respiratory:  No respiratory distress, slight expiratory wheeze with diminished breath sounds in right mid lung and lower lung fields, no rales  Cardiovascular:  Normal rate, normal rhythm, no murmurs, no gallops, no rubs   GI:  Soft, nondistended, normal bowel sounds, nontender, no organomegaly, no mass, no rebound, no guarding   :  No costovertebral angle tenderness   Musculoskeletal:  Bilateral lower extremity lymphedema, no tenderness, no deformities   Back- no tenderness  Integument:  Well hydrated, bilateral lower extremity chronic venous stasis dermatitis   Lymphatic:  No lymphadenopathy noted   Neurologic:  Alert &awake, communicative, CN 2-12 normal, normal motor function, normal sensory function, no focal deficits noted   Psychiatric:  Speech and behavior appropriate       Lab Results: I have personally reviewed pertinent reports  Results from last 7 days   Lab Units 11/07/22  2310   WBC Thousand/uL 5 46   HEMOGLOBIN g/dL 8 5*   HEMATOCRIT % 27 9*   PLATELETS Thousands/uL 126*   NEUTROS PCT % 72   LYMPHS PCT % 13*   MONOS PCT % 13*   EOS PCT % 1     Results from last 7 days   Lab Units 11/07/22  2310   POTASSIUM mmol/L 4 9   CHLORIDE mmol/L 107   CO2 mmol/L 34*   BUN mg/dL 30*   CREATININE mg/dL 1 93*   CALCIUM mg/dL 8 3   ALK PHOS U/L 77   ALT U/L 19   AST U/L 10           EKG:  Normal sinus rhythm HR 98, RBBB    Imaging: I have personally reviewed pertinent reports  CXR:  Personally reviewed, right pleural effusion re visualized and possibly larger in size from prior  Final radiologist read is pending  EGD    Addendum Date: 10/25/2022 Addendum:   [address] [place] [address] Ave: 10/20/22 PHYSICIAN(S): Attending: Nida Gonzalez DO Fellow: Eveline Rosenthal DO INDICATION: Anemia POST-OP DIAGNOSIS: See the impression below  PREPROCEDURE: Informed consent was obtained for the procedure, including sedation  Risks of perforation, hemorrhage, adverse drug reaction and aspiration were discussed  The patient was placed in the left lateral decubitus position  Patient was explained about the risks and benefits of the procedure  Risks including but not limited to bleeding, infection, and perforation were explained in detail  Also explained about less than 100% sensitivity with the exam and other alternatives  DETAILS OF PROCEDURE: Patient was taken to the procedure room where a time out was performed to confirm correct patient and correct procedure   The patient underwent monitored anesthesia care, which was administered by an anesthesia professional  The patient's blood pressure, heart rate, level of consciousness, respirations and oxygen were monitored throughout the procedure  The scope was advanced to the second part of the duodenum  Retroflexion was performed in the fundus  The patient experienced no blood loss  The procedure was not difficult  The patient tolerated the procedure well  There were no apparent complications  ANESTHESIA INFORMATION: ASA: III Anesthesia Type: IV Sedation with Anesthesia MEDICATIONS: sodium chloride 0 9 % inhalation solution 3 mL 6 mL*  *Some administrations are not included in total carvedilol (COREG) tablet 6 25 mg 6 25 mg gabapentin (NEURONTIN) capsule 100 mg 200 mg insulin aspart protamine-insulin aspart (NovoLOG 70/30) 100 units/mL subcutaneous injection 30 Units 30 Units lidocaine (LIDODERM) 5 % patch 1 patch 0 patch*  *Some administrations are not included in total pantoprazole (PROTONIX) EC tablet 40 mg 0 mg*  *Some administrations are not included in total pravastatin (PRAVACHOL) tablet 80 mg 80 mg tamsulosin (FLOMAX) capsule 0 4 mg 0 4 mg guaiFENesin (MUCINEX) 12 hr tablet 600 mg 600 mg levalbuterol (XOPENEX) inhalation solution 1 25 mg 2 5 mg*  *Some administrations are not included in total insulin lispro (HumaLOG) 100 units/mL subcutaneous injection 1-6 Units 3 Units insulin lispro (HumaLOG) 100 units/mL subcutaneous injection 1-5 Units 4 Units sodium chloride (OCEAN) 0 65 % nasal spray 1 spray 0 spray*  *Some administrations are not included in total mupirocin (BACTROBAN) 2 % ointment Cannot be calculated*  *Administration dose not documented EPINEPHrine (ADRENALIN) injection 0 4 mg melatonin tablet 3 mg 0 mg (Totals for administrations occurring from 10/20/22 1407 to 10/21/22 0802) FINDINGS: The esophagus appeared normal  Single ulcer at the duodenal sweep at a very difficult angle    It had a nonbleeding visible vessel (Pierce IIA); induced coagulation with argon plasma coagulation; injected 4 mL of epinephrine to address bleeding; and given the difficult angle and concern for rebleeding, hemospray was also used   The stomach appeared normal  On direct and retroflexed views SPECIMENS: * No specimens in log * IMPRESSION: Duodenal sweep ulcer with stigmata of recent bleeding  Treated with epi, APC, and eventual hemospray  RECOMMENDATION: Keep NPO  IV PPI  If rebleeding, IR for embolization  Will follow  ATTENDING ATTESTATION:  I was present throughout the entire procedure from insertion to complete withdrawal of the scope  I performed all interventions myself or oversaw the fellow  Arabella Gant DO     Result Date: 10/25/2022  Narrative: [facility] [address] [phone] DATE OF SERVICE: 10/20/22 PHYSICIAN(S): Attending: Raeann Mejia DO Fellow: Caroline Roman DO INDICATION: Anemia POST-OP DIAGNOSIS: See the impression below  PREPROCEDURE: Informed consent was obtained for the procedure, including sedation  Risks of perforation, hemorrhage, adverse drug reaction and aspiration were discussed  The patient was placed in the left lateral decubitus position  Patient was explained about the risks and benefits of the procedure  Risks including but not limited to bleeding, infection, and perforation were explained in detail  Also explained about less than 100% sensitivity with the exam and other alternatives  DETAILS OF PROCEDURE: Patient was taken to the procedure room where a time out was performed to confirm correct patient and correct procedure  The patient underwent monitored anesthesia care, which was administered by an anesthesia professional  The patient's blood pressure, heart rate, level of consciousness, respirations and oxygen were monitored throughout the procedure  The scope was advanced to the second part of the duodenum  Retroflexion was performed in the fundus  The patient experienced no blood loss  The procedure was not difficult  The patient tolerated the procedure well  There were no apparent complications   ANESTHESIA INFORMATION: ASA: III Anesthesia Type: IV Sedation with Anesthesia MEDICATIONS: sodium chloride 0 9 % inhalation solution 3 mL 6 mL*  *Some administrations are not included in total carvedilol (COREG) tablet 6 25 mg 6 25 mg gabapentin (NEURONTIN) capsule 100 mg 200 mg insulin aspart protamine-insulin aspart (NovoLOG 70/30) 100 units/mL subcutaneous injection 30 Units 30 Units lidocaine (LIDODERM) 5 % patch 1 patch 0 patch*  *Some administrations are not included in total pantoprazole (PROTONIX) EC tablet 40 mg 0 mg*  *Some administrations are not included in total pravastatin (PRAVACHOL) tablet 80 mg 80 mg tamsulosin (FLOMAX) capsule 0 4 mg 0 4 mg guaiFENesin (MUCINEX) 12 hr tablet 600 mg 600 mg levalbuterol (XOPENEX) inhalation solution 1 25 mg 2 5 mg*  *Some administrations are not included in total insulin lispro (HumaLOG) 100 units/mL subcutaneous injection 1-6 Units 3 Units insulin lispro (HumaLOG) 100 units/mL subcutaneous injection 1-5 Units 4 Units sodium chloride (OCEAN) 0 65 % nasal spray 1 spray 0 spray*  *Some administrations are not included in total mupirocin (BACTROBAN) 2 % ointment Cannot be calculated*  *Administration dose not documented EPINEPHrine (ADRENALIN) injection 0 4 mg melatonin tablet 3 mg 0 mg (Totals for administrations occurring from 10/20/22 1407 to 10/21/22 0802) FINDINGS: The esophagus appeared normal  Single ulcer at the duodenal sweep at a very difficult angle  It had a nonbleeding visible vessel (Pierce IIA); induced coagulation with argon plasma coagulation; injected 4 mL of epinephrine to address bleeding; and given the difficult angle and concern for rebleeding, hemospray was also used  The stomach appeared normal  On direct and retroflexed views SPECIMENS: * No specimens in log *     Impression: Duodenal sweep ulcer with stigmata of recent bleeding  Treated with epi, APC, and eventual hemospray  RECOMMENDATION: Keep NPO  IV PPI  If rebleeding, IR for embolization  Will follow  ATTENDING ATTESTATION:  I was present throughout the entire procedure from insertion to complete withdrawal of the scope  I performed all interventions myself or oversaw the fellow  Rachel Gant,      XR chest portable    Result Date: 10/26/2022  Narrative: CHEST INDICATION:   intubation  COMPARISON:  10/26/2022; 10/16/2022; 9/17/2022; 4/14/2021 EXAM PERFORMED/VIEWS:  XR CHEST PORTABLE FINDINGS:  Endotracheal tube is present, in satisfactory position with its tip above the level of the danay  Enteric tube is present with its tip extending below the left hemidiaphragm  Heart shadow is obscured by adjacent opacity  Chronic right-sided mediastinal shift with significant opacification of the much of the right hemithorax noted  Similar to numerous prior studies  The left costophrenic sulcus is collimated Left lung clear with compensatory hyperinflation of left lung, similar to prior studies  No acute osseous abnormality identified within limitations of portable radiography,     Impression: 1  Chronic opacification with volume loss in the right hemithorax is similar to prior studies  2   Interval intubation  Workstation performed: [id number]     XR chest portable    Result Date: 10/26/2022  Narrative: CHEST INDICATION:   resp distress  COMPARISON:  10/16/2022 EXAM PERFORMED/VIEWS:  XR CHEST PORTABLE FINDINGS: Marked patient rotation  Chronic right lung volume loss with midlung shift Increased right pleural effusion  Chronic right base atelectasis  Right base processes and midline shift limit cardiac size evaluation Clear left lung  No pneumothorax  Osseous structures appear within normal limits for patient age  Impression: Chronic right-sided changes including midline shift, increased right effusion and chronic right base atelectasis Workstation performed: [id number]     XR chest 1 view portable    Result Date: 10/17/2022  Narrative: CHEST INDICATION:   r/o pneumonia, pneumothorax   COMPARISON: 10/10/2022, 9/17/2022 EXAM PERFORMED/VIEWS:  XR CHEST PORTABLE FINDINGS: Mediastinal shift toward the right, a chronic finding  Chronic blunting of the right costophrenic angle and chronic opacities throughout the mid and lower lung zone on the right  Left lung remains clear  Osseous structures appear within normal limits for patient age  Impression: Stable appearance of volume loss and chronic opacities in the right Workstation performed: [id number]     XR chest 1 view portable    Result Date: 10/10/2022  Narrative: CHEST INDICATION:   sob  COMPARISON:  CXR 09/17/2022 and 8/29/2022, chest CT from 9/17/2022  EXAM PERFORMED/VIEWS:  XR CHEST PORTABLE FINDINGS: Cardiomediastinal silhouette appears unremarkable  No acute disease  Scar in the right upper lung with rounded atelectasis in the right base and persistent small loculated right effusion  Osseous structures appear within normal limits for patient age  Impression: No acute cardiopulmonary disease  Redemonstration of scar in the right upper lung, right base rounded atelectasis, and small loculated right effusion  Workstation performed: [id number]     XR chest portable ICU    Result Date: 10/26/2022  Narrative: CHEST INDICATION:   Ett tube  COMPARISON:  Compared with 10/26/2022 EXAM PERFORMED/VIEWS:  XR CHEST PORTABLE ICU FINDINGS:  Endotracheal tube 4 2 cm above the danay  Feeding tube below the diaphragm  Patient is rotated to the right  Decreased right lung volume  Hyperinflated left lung with emphysematous changes with prominent markings  Opacity obscuring the right lung base obscuring hemidiaphragm and costophrenic angle  Osseous structures appear within normal limits for patient age  Impression: Small-to-moderate right pleural effusion with underlying atelectasis  Decreased right lung volume  Workstation performed: [id number]         ** Please Note: Dragon 360 Dictation voice to text software was used in the creation of this document  **

## 2022-11-08 NOTE — ASSESSMENT & PLAN NOTE
Wt Readings from Last 3 Encounters:   11/01/22 97 4 kg (214 lb 11 7 oz)   10/23/22 95 8 kg (211 lb 3 2 oz)   10/15/22 98 7 kg (217 lb 11 1 oz)     · Possible increase in size of right pleural effusion, however patient otherwise does not appear grossly volume overloaded (does have chronic lymphedema) and actually reports weight loss  · Continue home diuretic therapy and cardiac medications  · Monitor volume status closely

## 2022-11-08 NOTE — UTILIZATION REVIEW
Initial Clinical Review    OBSERVATION WRITTEN 11/8/22 @ [address] 11/8/22 @ 1743 DUE TO FURTHER DIAGNOSTIC WORKUP REQUIRED FOR SHORTNESS OF BREATH AND RIGHT PLEURAL EFFUSION, REQUIRING AT LEAST A 2 MIDNIGHT STAY  Admission: Date/Time/Statement:   Admission Orders (From admission, onward)     Ordered        11/08/22 1743  Inpatient Admission  Once            11/08/22 0023  Place in Observation  Once                      Orders Placed This Encounter   Procedures   • Inpatient Admission     Standing Status:   Standing     Number of Occurrences:   1     Order Specific Question:   Level of Care     Answer:   Med Surg [16]     Order Specific Question:   Estimated length of stay     Answer:   More than 2 Midnights     Order Specific Question:   Certification     Answer:   I certify that inpatient services are medically necessary for this patient for a duration of greater than two midnights  See H&P and MD Progress Notes for additional information about the patient's course of treatment  ED Arrival Information     Expected   -    Arrival   11/7/2022 22:30    Acuity   Urgent            Means of arrival   Ambulance    Escorted by   [facility]    Service   Hospitalist    Admission type   Emergency            Arrival complaint   SOB           Chief Complaint   Patient presents with   • Shortness of Breath     Pt c/o increased SOB and feeling unwell for past week  Pt c/o chest tighness, and body shakes  Initial Presentation: 78 y o  male  with PMH of MARANDA, CHF, COPD, CAD, DDD, DM, GERD, HLD, HTN, Prostate CA presents with c/o shortness of breath at rest, chest pressure and WINTERS and cough  In ED, pt hypoxic and requiring 4 L O2 NC, received duoneb   Pt has had multiple hospitalization recently and multiple referrals have been made previously to social work/[facility]/therapists however patient apparently would not respond to messages thus he will be discharged from the services; appears at last admission it was found that he did not on the phone  Additionally patient lives alone and he was noted at prior admission that he appears to have limited support from son; long-term care placement has been advised previously however patient persistently refuses this option    Initially admitted under Observation status then changed to Inpatient dt continued SOB, right pleural effusion: Continue supplemental O2, inc spirometry, fu on final CXR read, tentative IR request for evaluation for potential repeat thoracentesis, continue neb txs, consult CM for dispo planning, monitor volume status closely, start accuchecks w/ SSI, continue home meds        Date:  11/9   Day 2:      ED Triage Vitals   Temperature Pulse Respirations Blood Pressure SpO2   11/07/22 2234 11/07/22 2234 11/07/22 2236 11/07/22 2236 11/07/22 2234   97 9 °F (36 6 °C) 95 20 (!) 184/78 94 %      Temp Source Heart Rate Source Patient Position - Orthostatic VS BP Location FiO2 (%)   11/07/22 2234 11/07/22 2234 11/07/22 2236 11/07/22 2236 --   Oral Monitor Sitting Left arm       Pain Score       11/07/22 2234       No Pain          Wt Readings from Last 1 Encounters:   11/09/22 103 kg (227 lb 3 2 oz)     Additional Vital Signs:   Date/Time Temp Pulse Resp BP MAP (mmHg) SpO2 Calculated FIO2 (%) - Nasal Cannula Nasal Cannula O2 Flow Rate (L/min) O2 Device Patient Position - Orthostatic VS   11/09/22 07:59:36 97 3 °F (36 3 °C) Abnormal  71 -- 137/67 90 98 % -- -- -- --   11/09/22 0745 -- 71 18 -- -- 99 % 28 2 L/min Nasal cannula --   11/08/22 23:43:08 98 3 °F (36 8 °C) 65 -- 122/51 75 99 % -- -- -- --   11/08/22 1907 -- -- -- -- -- 100 % -- -- -- --   11/08/22 16:31:32 97 9 °F (36 6 °C) 78 -- 134/65 88 98 % -- -- -- --   11/08/22 15:45:18 -- 77 16 122/57 -- 100 % -- -- -- --   11/08/22 15:27:35 -- -- -- 121/56 -- -- -- -- -- --   11/08/22 15:16:19 -- 74 15 -- -- 98 % -- -- -- --   11/08/22 0830 -- -- -- -- -- -- 28 2 L/min Nasal cannula --   11/08/22 07:59:07 97 7 °F (36 5 °C) 66 18 146/57 87 100 % -- -- Nasal cannula Lying   11/08/22 0731 -- -- -- -- -- 98 % -- -- -- --   11/08/22 0630 -- 100 -- 152/57 89 93 % -- -- -- --   11/08/22 01:43:50 97 7 °F (36 5 °C) 87 20 167/70 102 98 % 28 2 L/min Nasal cannula --   11/08/22 0100 -- 78 22 -- -- 99 % 28 2 L/min Nasal cannula --   11/08/22 0000 -- 82 21 -- -- 100 % 28 2 L/min Nasal cannula --   11/07/22 2330 -- 84 19 -- -- 100 % 28 2 L/min Nasal cannula --   11/07/22 2239 -- -- -- -- -- -- -- -- Nasal cannula --   11/07/22 2236 -- 86 20 184/78 Abnormal  -- 100 % 28 2 L/min Nasal cannula Sitting   11/07/22 2234 97 9 °F (36 6 °C) 95 -- -- -- 94 % 36 4 L/min Nasal cannula --     Pertinent Labs/Diagnostic Test Results:   11/7 EKG:  Baseline artifact probably normal sinus rhythm  Right bundle branch block  Left posterior fascicular block  *Bifascicular block *    X-ray chest 2 views   ED Interpretation by Trinity Cristina MD (11/07 2357)   Right pleural effusion present on prior CXR in late Oct 2022  Final Result by Anisa Sanchez MD (11/08 1413)      Persistent volume loss on the right with chronic right basilar pleural parenchymal changes              Workstation performed: [id number]         IR IN-Patient Thoracentesis    (Results Pending)   XR chest portable    (Results Pending)     Results from last 7 days   Lab Units 11/07/22  2322   SARS-COV-2  Negative     Results from last 7 days   Lab Units 11/09/22  0617 11/08/22  0527 11/07/22  2310   WBC Thousand/uL 5 18 5 64 5 46   HEMOGLOBIN g/dL 7 7* 7 9* 8 5*   HEMATOCRIT % 26 3* 26 5* 27 9*   PLATELETS Thousands/uL 114* 122* 126*   NEUTROS ABS Thousands/µL  --   --  3 93         Results from last 7 days   Lab Units 11/09/22  0617 11/08/22  0527 11/07/22  2310   SODIUM mmol/L 140 144 142   POTASSIUM mmol/L 4 8 4 5 4 9   CHLORIDE mmol/L 102 107 107   CO2 mmol/L 36* 34* 34*   ANION GAP mmol/L 2* 3* 1*   BUN mg/dL 34* 29* 30*   CREATININE mg/dL 2 00* 1 68* 1 93*   EGFR ml/min/1 73sq m 31 39 33   CALCIUM mg/dL 8 0* 8 2* 8 3   MAGNESIUM mg/dL 2 1  --   --    PHOSPHORUS mg/dL 4 2*  --   --      Results from last 7 days   Lab Units 11/07/22  2310   AST U/L 10   ALT U/L 19   ALK PHOS U/L 77   TOTAL PROTEIN g/dL 6 7   ALBUMIN g/dL 2 9*   TOTAL BILIRUBIN mg/dL 0 28     Results from last 7 days   Lab Units 11/09/22  0624 11/08/22  2054 11/08/22  1632 11/08/22  1158 11/08/22  0634 11/08/22  0627 11/08/22  0548 11/08/22  0056 11/02/22  1121   POC GLUCOSE mg/dl 171* 142* 244* 221* 130 104 54* 108 199*     Results from last 7 days   Lab Units 11/09/22  0617 11/08/22  0527 11/07/22  2310   GLUCOSE RANDOM mg/dL 205* 61* 160*     BETA-HYDROXYBUTYRATE   Date Value Ref Range Status   10/26/2022 0 1 <0 6 mmol/L Final   06/15/2019 0 2 <0 6 mmol/L Final      Results from last 7 days   Lab Units 11/08/22  0617 11/08/22  0108 11/07/22  2310   HS TNI 0HR ng/L  --   --  15   HS TNI 2HR ng/L  --  16  --    HSTNI D2 ng/L  --  1  --    HS TNI 4HR ng/L 18  --   --    HSTNI D4 ng/L 3  --   --          Results from last 7 days   Lab Units 11/08/22  0527   PROTIME seconds 13 5   INR  1 01     Results from last 7 days   Lab Units 11/07/22  2310   NT-PRO BNP pg/mL 2,569*     Results from last 7 days   Lab Units 11/07/22  2322   INFLUENZA A PCR  Negative   INFLUENZA B PCR  Negative   RSV PCR  Negative     ED Treatment:   Medication Administration from 11/07/2022 2230 to 11/08/2022 0138       Date/Time Order Dose Route Action     11/07/2022 2322 ipratropium-albuterol (DUO-NEB) 0 5-2 5 mg/3 mL inhalation solution 3 mL 3 mL Nebulization Given     11/08/2022 0101 insulin glargine (LANTUS) subcutaneous injection 30 Units 0 3 mL 30 Units Subcutaneous Given        Past Medical History:   Diagnosis Date   • Abdominal pain    • MARANDA (acute kidney injury) ([facility] ) 6/19/2018   • Cardiac disease    • CHF (congestive heart failure) ([facility])    • COPD, severe ([facility] )    • Coronary artery disease    • DDD (degenerative disc disease), lumbar 9/1/2020   • Diabetes mellitus (HCC)    • Dyspnea    • GERD (gastroesophageal reflux disease)    • Hyperlipidemia    • Hypertension    • MI (myocardial infarction) ([facility] )     with 3 stents   • Nodule of apex of right lung    • JACQUELINE (obstructive sleep apnea)    • Prostate cancer ([facility] )      Present on Admission:  • Shortness of breath  • Pleural effusion on right  • COPD (chronic obstructive pulmonary disease) ([facility])  • Chronic diastolic heart failure ([facility])  • A-fib ([facility])  • Stage 3b chronic kidney disease (CKD) ([facility])  • Frequent hospital admissions  • Anemia, iron deficiency      Admitting Diagnosis: Dyspnea [R06 00]  SOB (shortness of breath) [R06 02]  Recurrent right pleural effusion [J90]  Frequent hospital admissions [Z78 9]  Age/Sex: 78 y o  male  Admission Orders:  Scheduled Medications:  apixaban, 5 mg, Oral, BID  aspirin, 81 mg, Oral, Daily  carvedilol, 6 25 mg, Oral, BID With Meals  ferrous sulfate, 325 mg, Oral, Daily With Breakfast  Fluticasone Furoate-Vilanterol, 1 puff, Inhalation, Daily  furosemide, 40 mg, Oral, Daily  gabapentin, 100 mg, Oral, TID  insulin glargine, 30 Units, Subcutaneous, HS  insulin lispro, 1-6 Units, Subcutaneous, HS  insulin lispro, 10 Units, Subcutaneous, TID With Meals  insulin lispro, 2-12 Units, Subcutaneous, TID AC  ipratropium, 0 5 mg, Nebulization, TID  isosorbide mononitrate, 30 mg, Oral, Daily  levalbuterol, 1 25 mg, Nebulization, TID  pantoprazole, 40 mg, Oral, BID AC  pravastatin, 80 mg, Oral, Daily With Dinner  tamsulosin, 0 4 mg, Oral, Daily With Dinner      Continuous IV Infusions: none     PRN Meds:  acetaminophen, 650 mg, Oral, Q6H PRN  cyclobenzaprine, 10 mg, Oral, BID PRN  ondansetron, 4 mg, Intravenous, Q6H PRN    scd    IP CONSULT TO CASE MANAGEMENT    Network Utilization Review Department  ATTENTION: Please call with any questions or concerns to [phone] and carefully listen to the prompts so that you are directed to the right person   All voicemails are confidential   [place] all requests for admission clinical reviews, approved or denied determinations and any other requests to dedicated fax number below belonging to the campus where the patient is receiving treatment   List of dedicated fax numbers for the Facilities:  [address] DENIALS (Administrative/Medical Necessity) [phone]   [address] (Maternity/NICU/Pediatrics) [phone]   91 Annabelle Roth [phone]   [facility]  [phone]   [id number] [place] [address] 28 [phone]   [address] [place] [facility] [address] [phone]

## 2022-11-08 NOTE — BRIEF OP NOTE (RAD/CATH)
Right IR THORACENTESIS Procedure Note    PATIENT NAME: Claus Hidalgo Sr   : 1945  MRN: [id number]    Pre-op Diagnosis:   1  Dyspnea    2  Recurrent right pleural effusion    3  Frequent hospital admissions      Post-op Diagnosis:   1  Dyspnea    2  Recurrent right pleural effusion    3  Frequent hospital admissions        Provider:  Bindu Agudelo PA-C      No qualified resident was available, Resident is only observing    Estimated Blood Loss: minimal    Findings: right sided pleural effusion  770cc clear yellow fluid removed       Specimens: sent    Complications:  None immediate    Anesthesia: local    Bindu Agudelo     Date: 2022  Time: 3:51 PM

## 2022-11-08 NOTE — ASSESSMENT & PLAN NOTE
Lab Results   Component Value Date    EGFR 33 11/07/2022    EGFR 29 11/02/2022    EGFR 32 11/01/2022    CREATININE 1 93 (H) 11/07/2022    CREATININE 2 10 (H) 11/02/2022    CREATININE 1 96 (H) 11/01/2022   · Creatinine at recent baseline, monitor with BMP

## 2022-11-08 NOTE — ED ATTENDING ATTESTATION
11/7/2022  I, Salas Ramírez MD, saw and evaluated the patient  I have discussed the patient with the resident/non-physician practitioner and agree with the resident's/non-physician practitioner's findings, Plan of Care, and MDM as documented in the resident's/non-physician practitioner's note, except where noted  All available labs and Radiology studies were reviewed  I was present for key portions of any procedure(s) performed by the resident/non-physician practitioner and I was immediately available to provide assistance  At this point I agree with the current assessment done in the Emergency Department  I have conducted an independent evaluation of this patient a history and physical is as follows:    ED Course         Critical Care Time  Procedures    79 yo male with hx of chf, copd, gi bleed, cad with stents, admitted and discharged one week ago, having cp and sob worse with exertion  Pt with less swelling in legs and weight loss  Pt had one bloody bm  Vss, afebrile, lungs diminished bs, rrr, abdomen soft nontender, no pedal edema  Cardiac workup, bnp, type and screen, duoneb

## 2022-11-08 NOTE — RESPIRATORY THERAPY NOTE
Resp care   11/08/22 1345   Inhalation Therapy Tx   $ Inhalation Therapy Performed Yes   Pre-Treatment Pulse 73   Breath Sounds Pre-Treatment Bilateral Clear;Diminished   Resp Comments spoke with md outside room  States pt c/o congestion  Spoke with pt and he is familiar with flutter and states it does not work for him and does not want it  Will cont udn tx

## 2022-11-08 NOTE — PLAN OF CARE
Problem: PAIN - ADULT  Goal: Verbalizes/displays adequate comfort level or baseline comfort level  Description: Interventions:  - Encourage patient to monitor pain and request assistance  - Assess pain using appropriate pain scale  - Administer analgesics based on type and severity of pain and evaluate response  - Implement non-pharmacological measures as appropriate and evaluate response  - Consider cultural and social influences on pain and pain management  - Notify physician/advanced practitioner if interventions unsuccessful or patient reports new pain  Outcome: Progressing     Problem: INFECTION - ADULT  Goal: Absence or prevention of progression during hospitalization  Description: INTERVENTIONS:  - Assess and monitor for signs and symptoms of infection  - Monitor lab/diagnostic results  - Monitor all insertion sites, i e  indwelling lines, tubes, and drains  - Monitor endotracheal if appropriate and nasal secretions for changes in amount and color  - [place] appropriate cooling/warming therapies per order  - Administer medications as ordered  - Instruct and encourage patient and family to use good hand hygiene technique  - Identify and instruct in appropriate isolation precautions for identified infection/condition  Outcome: Progressing     Problem: Knowledge Deficit  Goal: Patient/family/caregiver demonstrates understanding of disease process, treatment plan, medications, and discharge instructions  Description: Complete learning assessment and assess knowledge base    Interventions:  - Provide teaching at level of understanding  - Provide teaching via preferred learning methods  Outcome: Progressing

## 2022-11-08 NOTE — ASSESSMENT & PLAN NOTE
· Patient with at least 16 hospitalizations in 2002, frequently with recurrent chest pain or shortness of breath often secondary to either COPD exacerbation or acute on chronic diastolic heart failure  · Concerns at prior admissions and other admissions regarding patient's compliance with oxygen therapy and medications; he does state to me on admission that he has been compliant with oxygen/medications but when questioned previously other admissions has admitted to other providers that he has not been fully compliant  · Unfortunately also with difficult social situation:  Patient noted at prior admission to not have phone and despite attempts to set up patient with visiting nurses/diarrhea/social workers unable to reach patient or son and often discharged from service  Unfortunately appears son has not been greatly involved with cares and has been difficult to reach  Additionally patient has refused long-term care placement and continues to do so    · Will consult cm again, however patient unfortunately will remain high risk for readmission given ongoing poor social situation and multiple medical comorbidities

## 2022-11-09 ENCOUNTER — APPOINTMENT (INPATIENT)
Dept: RADIOLOGY | Facility: HOSPITAL | Age: 77
End: 2022-11-09
Attending: INTERNAL MEDICINE

## 2022-11-09 VITALS
BODY MASS INDEX: 30.77 KG/M2 | OXYGEN SATURATION: 100 % | DIASTOLIC BLOOD PRESSURE: 46 MMHG | TEMPERATURE: 98.2 F | WEIGHT: 227.2 LBS | SYSTOLIC BLOOD PRESSURE: 118 MMHG | HEIGHT: 72 IN | RESPIRATION RATE: 18 BRPM | HEART RATE: 65 BPM

## 2022-11-09 LAB
ANION GAP SERPL CALCULATED.3IONS-SCNC: 2 MMOL/L (ref 4–13)
BUN SERPL-MCNC: 34 MG/DL (ref 5–25)
CALCIUM SERPL-MCNC: 8 MG/DL (ref 8.3–10.1)
CHLORIDE SERPL-SCNC: 102 MMOL/L (ref 96–108)
CO2 SERPL-SCNC: 36 MMOL/L (ref 21–32)
CREAT SERPL-MCNC: 2 MG/DL (ref 0.6–1.3)
ERYTHROCYTE [DISTWIDTH] IN BLOOD BY AUTOMATED COUNT: 15.6 % (ref 11.6–15.1)
GFR SERPL CREATININE-BSD FRML MDRD: 31 ML/MIN/1.73SQ M
GLUCOSE SERPL-MCNC: 101 MG/DL (ref 65–140)
GLUCOSE SERPL-MCNC: 107 MG/DL (ref 65–140)
GLUCOSE SERPL-MCNC: 171 MG/DL (ref 65–140)
GLUCOSE SERPL-MCNC: 205 MG/DL (ref 65–140)
HCT VFR BLD AUTO: 26.3 % (ref 36.5–49.3)
HGB BLD-MCNC: 7.7 G/DL (ref 12–17)
MAGNESIUM SERPL-MCNC: 2.1 MG/DL (ref 1.6–2.6)
MCH RBC QN AUTO: 26.6 PG (ref 26.8–34.3)
MCHC RBC AUTO-ENTMCNC: 29.3 G/DL (ref 31.4–37.4)
MCV RBC AUTO: 91 FL (ref 82–98)
PHOSPHATE SERPL-MCNC: 4.2 MG/DL (ref 2.3–4.1)
PLATELET # BLD AUTO: 114 THOUSANDS/UL (ref 149–390)
PMV BLD AUTO: 10.3 FL (ref 8.9–12.7)
POTASSIUM SERPL-SCNC: 4.8 MMOL/L (ref 3.5–5.3)
PROCALCITONIN SERPL-MCNC: 0.12 NG/ML
RBC # BLD AUTO: 2.89 MILLION/UL (ref 3.88–5.62)
SODIUM SERPL-SCNC: 140 MMOL/L (ref 135–147)
WBC # BLD AUTO: 5.18 THOUSAND/UL (ref 4.31–10.16)

## 2022-11-09 RX ADMIN — ISOSORBIDE MONONITRATE 30 MG: 30 TABLET, EXTENDED RELEASE ORAL at 08:26

## 2022-11-09 RX ADMIN — APIXABAN 5 MG: 5 TABLET, FILM COATED ORAL at 08:26

## 2022-11-09 RX ADMIN — INSULIN LISPRO 10 UNITS: 100 INJECTION, SOLUTION INTRAVENOUS; SUBCUTANEOUS at 12:24

## 2022-11-09 RX ADMIN — FLUTICASONE FUROATE AND VILANTEROL TRIFENATATE 1 PUFF: 100; 25 POWDER RESPIRATORY (INHALATION) at 08:26

## 2022-11-09 RX ADMIN — CARVEDILOL 6.25 MG: 6.25 TABLET, FILM COATED ORAL at 17:07

## 2022-11-09 RX ADMIN — GABAPENTIN 100 MG: 100 CAPSULE ORAL at 17:08

## 2022-11-09 RX ADMIN — GABAPENTIN 100 MG: 100 CAPSULE ORAL at 08:26

## 2022-11-09 RX ADMIN — APIXABAN 5 MG: 5 TABLET, FILM COATED ORAL at 17:07

## 2022-11-09 RX ADMIN — PANTOPRAZOLE SODIUM 40 MG: 40 TABLET, DELAYED RELEASE ORAL at 06:27

## 2022-11-09 RX ADMIN — IPRATROPIUM BROMIDE 0.5 MG: 0.5 SOLUTION RESPIRATORY (INHALATION) at 07:45

## 2022-11-09 RX ADMIN — PANTOPRAZOLE SODIUM 40 MG: 40 TABLET, DELAYED RELEASE ORAL at 17:08

## 2022-11-09 RX ADMIN — INSULIN LISPRO 10 UNITS: 100 INJECTION, SOLUTION INTRAVENOUS; SUBCUTANEOUS at 08:27

## 2022-11-09 RX ADMIN — LEVALBUTEROL 1.25 MG: 1.25 SOLUTION, CONCENTRATE RESPIRATORY (INHALATION) at 14:16

## 2022-11-09 RX ADMIN — FERROUS SULFATE TAB 325 MG (65 MG ELEMENTAL FE) 325 MG: 325 (65 FE) TAB at 08:26

## 2022-11-09 RX ADMIN — IPRATROPIUM BROMIDE 0.5 MG: 0.5 SOLUTION RESPIRATORY (INHALATION) at 14:16

## 2022-11-09 RX ADMIN — PRAVASTATIN SODIUM 80 MG: 80 TABLET ORAL at 17:07

## 2022-11-09 RX ADMIN — ASPIRIN 81 MG: 81 TABLET, COATED ORAL at 08:26

## 2022-11-09 RX ADMIN — LEVALBUTEROL 1.25 MG: 1.25 SOLUTION, CONCENTRATE RESPIRATORY (INHALATION) at 07:45

## 2022-11-09 RX ADMIN — CARVEDILOL 6.25 MG: 6.25 TABLET, FILM COATED ORAL at 08:26

## 2022-11-09 RX ADMIN — INSULIN LISPRO 2 UNITS: 100 INJECTION, SOLUTION INTRAVENOUS; SUBCUTANEOUS at 06:27

## 2022-11-09 RX ADMIN — FUROSEMIDE 40 MG: 40 TABLET ORAL at 08:26

## 2022-11-09 RX ADMIN — TAMSULOSIN HYDROCHLORIDE 0.4 MG: 0.4 CAPSULE ORAL at 17:07

## 2022-11-09 NOTE — CASE MANAGEMENT
Case Management Assessment & Discharge Planning Note    Patient name Malorie Wagner  Location 2 213/CW2 213-02 MRN [id number]  : 1945 Date 2022       Current Admission Date: 2022  Current Admission Diagnosis:Shortness of breath   Patient Active Problem List    Diagnosis Date Noted   • Shortness of breath 2022   • Acute on chronic anemia 10/29/2022   • Acute-on-chronic kidney injury ([facility] ) 10/27/2022   • SIRS (systemic inflammatory response syndrome) ([facility] ) 10/27/2022   • A-fib ([place] [address] ) 10/10/2022   • Frequent hospital admissions 2022   • Hypothermia 2022   • Epididymitis, bilateral 06/15/2022   • Chronic left-sided low back pain without sciatica 2022   • Stage 3b chronic kidney disease (CKD) ([facility] ) 2022   • History of prostate cancer 2022   • Adenocarcinoma of lung ([place] [address] ) 2022   • Fracture of navicular bone of left foot 2021   • Acute on chronic respiratory failure with hypoxia and hypercapnia ([facility] ) 04/15/2021   • PAD (peripheral artery disease) ([facility] ) 2021   • DDD (degenerative disc disease), lumbar 2020   • Anemia, iron deficiency 2020   • Lung nodule 2020   • Pulmonary hypertension ([facility] ) 2019   • JACQUELINE (obstructive sleep apnea) 2019   • COPD with acute exacerbation ([facility] ) 2019   • Oxygen dependent 2018   • Acute metabolic encephalopathy    • RBBB 2018   • CAD (coronary artery disease) 2018   • Chronic diastolic heart failure ([facility] ) 2018   • Pleural effusion on right 10/31/2017   • Diabetic neuropathy ([facility] ) 2017   • Essential hypertension 2016   • Venous stasis dermatitis of both lower extremities 11/15/2016   • Type 2 diabetes mellitus with hyperglycemia, with long-term current use of insulin ([facility] ) 2016   • COPD (chronic obstructive pulmonary disease) ([place] [address] ) 2016   • HLD (hyperlipidemia) 2016      LOS (days): 1  Geometric Mean LOS (GMLOS) (days): [facility]     OBJECTIVE:  PATIENT READMITTED TO HOSPITAL  Risk of Unplanned Readmission Score: 82 83         Current admission status: Inpatient       Preferred Pharmacy:   [address] [place] [address], [address] [place] [address]  Phone: [phone] Fax: 3224 [facility] [place] [address] [facility] 94 [facility] 38 210 [facility]  Phone: [phone] Fax: [phone]    Primary Care Provider: Luz Beltre MD    Primary Insurance: [facility]  Secondary Insurance:     ASSESSMENT:  [address] Representative - Son   Primary Phone: [phone] (Home)  Mobile Phone: [phone]                         Readmission Root Cause  30 Day Readmission: Yes  Who directed you to return to the hospital?: Self  Did you understand whom to contact if you had questions or problems?: Yes  Did you get your prescriptions before you left the hospital?: Yes  Were you able to get your prescriptions filled when you left the hospital?: Yes  Did you take your medications as prescribed?: Yes  During previous admission, was a post-acute recommendation made?: Yes  What post-acute resources were offered?: STR, Offered, but declined  Patient was readmitted due to: shortness of breath  Action Plan: admission -  thoracentisis    Patient Information  Admitted from[de-identified] Home  Mental Status: Alert  During Assessment patient was accompanied by: Not accompanied during assessment  Assessment information provided by[de-identified] Patient  Primary Caregiver: Self  Support Systems: Son  [place] of Residence: [address] [place] [address] do you live in?: [facility] entry access options   Select all that apply : No steps to enter home  Type of Current Residence: 2 story home  Upon entering residence, is there a bedroom on the main floor (no further steps)?: Yes  Upon entering residence, is there a bathroom on the main floor (no further steps)?: Yes  In the last 12 months, was there a time when you were not able to pay the mortgage or rent on time?: No  In the last 12 months, was there a time when you did not have a steady place to sleep or slept in a shelter (including now)?: No  Homeless/housing insecurity resource given?: N/A  Living Arrangements: Lives w/ Son  Is patient a ?: No    Activities of Daily Living Prior to Admission  Functional Status: Independent  Completes ADLs independently?: Yes  Ambulates independently?: Yes  Does patient use assisted devices?: Yes  Assisted Devices (DME) used:  Shower Chair, Straight Cane  Does patient currently own DME?: Yes  What DME does the patient currently own?: Shower Chair, Straight Cane  Does the patient have a history of Short-Term Rehab?: No  Does patient have a history of HHC?: Yes  Does patient currently have [facility]?: No (has been referred but according to patient has not started due to frequent readmissions to the hospital)         Patient Information Continued  Income Source: Pension/FPC  Within the past 12 months, you worried that your food would run out before you got the money to buy more : Never true  Within the past 12 months, the food you bought just didn't last and you didn't have money to get more : Never true  Food insecurity resource given?: N/A  Does patient receive dialysis treatments?: No  Does patient have a history of substance abuse?: No  Does patient have a history of Mental Health Diagnosis?: No         Means of Transportation  In the past 12 months, has lack of transportation kept you from medical appointments or from getting medications?: No  In the past 12 months, has lack of transportation kept you from meetings, work, or from getting things needed for daily living?: No        DISCHARGE DETAILS:    Discharge planning discussed with[de-identified] patient  Freedom of Choice: Yes     CM contacted family/caregiver?: No- see comments (patient alert & oriented)  Were Treatment Team discharge recommendations reviewed with patient/caregiver?: Yes  Did patient/caregiver verbalize understanding of patient care needs?: Yes  Were patient/caregiver advised of the risks associated with not following Treatment Team discharge recommendations?: Yes    Contacts  Patient Contacts: Yael Shea , son  Relationship to Patient[de-identified] Family  Contact Method: Phone  Phone Number: [phone] OR [phone]  Reason/Outcome: Discharge [address]         [facility]?: Yes  [address] requested[de-identified] Nursing, Occupational Therapy, Physical [address] Name[de-identified] Other  [facility] Provider[de-identified] PCP  Home Health Services Needed[de-identified] Strengthening/Theraputic Exercises to Improve Function, Oxygen Via Nasal Cannula, Evaluate Functional Status and Safety, Gait/ADL Training  Oxygen LPM Ordered (if applicable based on home health services needed):: 2 LPM  Homebound Criteria Met[de-identified] Requires the Assistance of Another Person for Safe Ambulation or to Leave the Home, Uses an Assist Device (i e  cane, walker, etc)  Supporting Clincal Findings[de-identified] Limited Endurance, Fatigues Easliy in [employer], Requires Oxygen                   Treatment Team Recommendation: Other (TBD)  Discharge Destination Plan[de-identified] Other (TBD -- likely home with home health, has declined STR in the past)  Transport at Discharge : Other (Comment) (TBD -- [facility] [address] [facility])                Patient/caregiver received discharge checklist   Content reviewed  Patient/caregiver encouraged to participate in discharge plan of care prior to discharge home    CM reviewed d/c planning process including the following: identifying help at home, patient preference for d/c planning needs, Discharge Lounge, [facility] Meds to Bed program, availability of treatment team to discuss questions or concerns patient and/or family may have regarding understanding medications and recognizing signs and symptoms once discharged  CM also encouraged patient to follow up with all recommended appointments after discharge  Patient advised of importance for patient and family to participate in managing patient’s medical well being  Additional Comments: Patient stated he continues to reside with son, in 2 story home with no steps to enter, with bed/bath on main floor  Has portable O2 and home O2 concentrator provided by [employer]  [facility] has been referred several times, but has been unable to start services because as per patient he has been readmitted to the hospital   Patient has declined STR in the past  CM will continue to follow for discharge needs  ADDENDUM: Referral patient to the [facility] [address], e-mail sent

## 2022-11-09 NOTE — UTILIZATION REVIEW
Continued Stay Review  ED CARE & PROVIDER 11/7/2022  FIRST ATTENDING NOTE 11/8/2022  OBSERVATION & INPATIENT ADMISSION ORDERS ON 11/8/2022  Date: 11/9/2022                          Current Patient Class: inpatient    Current Level of Care: med surg     HPI:77 y o  male initially admit OBSERVATION WRITTEN 11/8/22 @ [address] 11/8/22 @ 1743 DUE TO FURTHER DIAGNOSTIC WORKUP REQUIRED FOR SHORTNESS OF BREATH AND RIGHT PLEURAL EFFUSION, REQUIRING AT LEAST A 2 MIDNIGHT STAY  Assessment/Plan:   11/8/2022   RN documentation:    Right thoracentesis completed by IR  770 of serosanguinous fluid removed, specimens sent to lab  IR PA:  Right IR THORACENTESIS Procedure Note   Findings: right sided pleural effusion  770cc clear yellow fluid removed  Specimens: sent   Anesthesia: local  MD:  Pt seen post thoracentesis, 770ml removed, fluid studies are pending  ON 2 L NC, Reports  breathing is still not baseline  Continues to have some RLE crackles  Will repeat CXR, AM CBC, BMP, procal   Recheck weights to compare to discharge weight from last hospitalization, may need more diuresis  Will need greater then 2 midnights to evaluate and treat his shortness of breath  11/9/2022  MD:   POA SOB- noted to have a pleural effusion  tapped by IR  He received one dose of lasix also afterwards and improved  CXR afterwards was significantly improved and he was discharged back home  He did continue to refuse physical therapy and rehab    Vital Signs:   Date/Time Temp Pulse Resp BP MAP (mmHg) SpO2 Calculated FIO2 (%) - Nasal Cannula Nasal Cannula O2 Flow Rate (L/min) O2 Device Patient Position - Orthostatic VS   11/09/22 15:31:44 98 2 °F (36 8 °C) 65 18 118/46 Abnormal  70 100 % -- -- Nasal cannula --   11/09/22 1417 -- -- -- -- -- 99 % -- -- -- --   11/09/22 0920 -- -- -- -- -- -- 28 2 L/min Nasal cannula --   11/09/22 07:59:36 97 3 °F (36 3 °C) Abnormal  71 18 137/67 90 98 % -- -- Nasal cannula Lying 11/09/22 0745 -- 71 18 -- -- 99 % 28 2 L/min Nasal cannula --   11/08/22 23:43:08 98 3 °F (36 8 °C) 65 -- 122/51 75 99 % -- -- -- --   11/08/22 1907 -- -- -- -- -- 100 % -- -- -- --   11/08/22 16:31:32 97 9 °F (36 6 °C) 78 -- 134/65 88 98 % -- -- -- --   11/08/22 15:45:18 -- 77 16 122/57 -- 100 % -- -- -- --   11/08/22 15:27:35 -- -- -- 121/56 -- -- -- -- -- --   11/08/22 15:16:19 -- 74 15 -- -- 98 % -- -- -- --   11/08/22 0830 -- -- -- -- -- -- 28 2 L/min Nasal cannula --       Pertinent Labs/Diagnostic Results:   Results from last 7 days   Lab Units 11/07/22  2322   SARS-COV-2  Negative     Results from last 7 days   Lab Units 11/09/22  0617 11/08/22  0527 11/07/22  2310   WBC Thousand/uL 5 18 5 64 5 46   HEMOGLOBIN g/dL 7 7* 7 9* 8 5*   HEMATOCRIT % 26 3* 26 5* 27 9*   PLATELETS Thousands/uL 114* 122* 126*   NEUTROS ABS Thousands/µL  --   --  3 93         Results from last 7 days   Lab Units 11/09/22  0617 11/08/22  0527 11/07/22  2310   SODIUM mmol/L 140 144 142   POTASSIUM mmol/L 4 8 4 5 4 9   CHLORIDE mmol/L 102 107 107   CO2 mmol/L 36* 34* 34*   ANION GAP mmol/L 2* 3* 1*   BUN mg/dL 34* 29* 30*   CREATININE mg/dL 2 00* 1 68* 1 93*   EGFR ml/min/1 73sq m 31 39 33   CALCIUM mg/dL 8 0* 8 2* 8 3   MAGNESIUM mg/dL 2 1  --   --    PHOSPHORUS mg/dL 4 2*  --   --      Results from last 7 days   Lab Units 11/07/22  2310   AST U/L 10   ALT U/L 19   ALK PHOS U/L 77   TOTAL PROTEIN g/dL 6 7   ALBUMIN g/dL 2 9*   TOTAL BILIRUBIN mg/dL 0 28     Results from last 7 days   Lab Units 11/09/22  1122 11/09/22  0624 11/08/22  2054 11/08/22  1632 11/08/22  1158 11/08/22  0634 11/08/22  0627 11/08/22  0548 11/08/22  0056   POC GLUCOSE mg/dl 107 171* 142* 244* 221* 130 104 54* 108     Results from last 7 days   Lab Units 11/09/22  0617 11/08/22  0527 11/07/22  2310   GLUCOSE RANDOM mg/dL 205* 61* 160*             BETA-HYDROXYBUTYRATE   Date Value Ref Range Status   10/26/2022 0 1 <0 6 mmol/L Final   06/15/2019 0 2 <0 6 mmol/L Final                      Results from last 7 days   Lab Units 11/08/22  0617 11/08/22  0108 11/07/22  2310   HS TNI 0HR ng/L  --   --  15   HS TNI 2HR ng/L  --  16  --    HSTNI D2 ng/L  --  1  --    HS TNI 4HR ng/L 18  --   --    HSTNI D4 ng/L 3  --   --          Results from last 7 days   Lab Units 11/08/22  0527   PROTIME seconds 13 5   INR  1 01         Results from last 7 days   Lab Units 11/09/22  0617   PROCALCITONIN ng/ml 0 12                 Results from last 7 days   Lab Units 11/07/22  2310   NT-PRO BNP pg/mL 2,569*                                 Results from last 7 days   Lab Units 11/07/22  2322   INFLUENZA A PCR  Negative   INFLUENZA B PCR  Negative   RSV PCR  Negative                             Results from last 7 days   Lab Units 11/08/22  1548   GRAM STAIN RESULT  1+ Polys  No bacteria seen   BODY FLUID CULTURE, STERILE  No growth     Results from last 7 days   Lab Units 11/08/22  1545   TOTAL COUNTED  100   WBC FLUID /ul 1,213             Medications:   Scheduled Medications:  apixaban, 5 mg, Oral, BID  aspirin, 81 mg, Oral, Daily  carvedilol, 6 25 mg, Oral, BID With Meals  ferrous sulfate, 325 mg, Oral, Daily With Breakfast  Fluticasone Furoate-Vilanterol, 1 puff, Inhalation, Daily  furosemide, 40 mg, Oral, Daily  gabapentin, 100 mg, Oral, TID  insulin glargine, 30 Units, Subcutaneous, HS  insulin lispro, 1-6 Units, Subcutaneous, HS  insulin lispro, 10 Units, Subcutaneous, TID With Meals  insulin lispro, 2-12 Units, Subcutaneous, TID AC  ipratropium, 0 5 mg, Nebulization, TID  isosorbide mononitrate, 30 mg, Oral, Daily  levalbuterol, 1 25 mg, Nebulization, TID  pantoprazole, 40 mg, Oral, BID AC  pravastatin, 80 mg, Oral, Daily With Dinner  tamsulosin, 0 4 mg, Oral, Daily With Dinner  Medications 11/08 11/09   furosemide (LASIX) injection 40 mg  Dose: 40 mg  Freq:  Once Route: IV  Start: 11/08/22 2015 End: 11/08/22 2027    2027         furosemide (LASIX) tablet 40 mg  Dose: 40 mg  Freq: Daily Route: PO  Start: 11/08/22 0900 End: 11/09/22 2009   Admin Instructions:   LOOK ALIKE SOUND ALIKE MED   Order specific questions:   Hold for systolic blood pressure less than (mmHg) 110    1011      0826     2009-D/C'd        Continuous IV Infusions:     PRN Meds:  acetaminophen, 650 mg, Oral, Q6H PRN  cyclobenzaprine, 10 mg, Oral, BID PRN  ondansetron, 4 mg, Intravenous, Q6H PRN    Discharge Plan: DC 11/9    Network Utilization Review Department  ATTENTION: Please call with any questions or concerns to [phone] and carefully listen to the prompts so that you are directed to the right person  All voicemails are confidential   Jessica Porras all requests for admission clinical reviews, approved or denied determinations and any other requests to dedicated fax number below belonging to the campus where the patient is receiving treatment   List of dedicated fax numbers for the Facilities:  [address] DENIALS (Administrative/Medical Necessity) [phone]   [address] (Maternity/NICU/Pediatrics) [phone]   1 Annabelle Roth [phone]   [place] [address] [phone]   [address] [address] [facility] [phone]   1558 [facility] Far[facility] 134 815 [facility] [phone]

## 2022-11-09 NOTE — ASSESSMENT & PLAN NOTE
· Patient with history of lung adenocarcinoma s/p SPRT complicated by recurrent right pleural effusions; most recent thoracentesis 04/2021 with lymphocytic and exudative fluid  · As above

## 2022-11-09 NOTE — ASSESSMENT & PLAN NOTE
Lab Results   Component Value Date    HGBA1C 9 7 (H) 10/10/2022       Recent Labs     11/08/22  1632 11/08/22  2054 11/09/22  0624 11/09/22  1122   POCGLU 244* 142* 171* 107       Blood Sugar Average: Last 72 hrs:  (P) 590 [id number]   · Continue home Lantus 30 units q h s  and Humalog 10 units t i d  with meals  · Add correctional insulin with Accu-Cheks and carb controlled diet  · Initiate hypoglycemia protocol

## 2022-11-09 NOTE — ASSESSMENT & PLAN NOTE
· Presented with worsening shortness of breath x1 day  Patient with multiple medical issues as below and multiple hospitalizations with shortness of breath including most recently 10/26/2022-11/02/2022 unfortunately complicated by acute on chronic hypoxic/hypercapnic respiratory failure requiring intubation and ICU admission  · Briefly required up to 4 L NC during ED evaluation but is now back on baseline 2 L NC, improved s/p DuoNeb provided during ED evaluation      · Possible mild/residual COPD exacerbation from prior as etiology versus increase in size of right pleural effusion, management of these as per associated problems  · Continue supplemental oxygen as needed to maintain SaO2 greater than 88%  · Incentive spirometry, pulmonary toileting  · Reports improvement today, CXR repeated today and significantly improved and likely at his baseline given the history of chronic right lung volume loss secondary to scarring and atelectasis after SBRT of adenocarcinoma in the past   · Will discharge home today

## 2022-11-09 NOTE — ASSESSMENT & PLAN NOTE
Wt Readings from Last 3 Encounters:   11/09/22 103 kg (227 lb 3 2 oz)   11/01/22 97 4 kg (214 lb 11 7 oz)   10/23/22 95 8 kg (211 lb 3 2 oz)     · Possible increase in size of right pleural effusion, however patient otherwise does not appear grossly volume overloaded (does have chronic lymphedema) and actually reports weight loss  · Continue home diuretic therapy and cardiac medications

## 2022-11-09 NOTE — DISCHARGE SUMMARY
[address] [facility]  Discharge- Ant Garcia Sr  1945, 78 y o  male MRN: [id number]  Unit/Bed#: Ole Lpoez-02 Encounter: [id number]  Primary Care Provider: Stephanie Tobin MD   Date and time admitted to hospital: 11/7/2022 10:30 PM    * Shortness of breath  Assessment & Plan  · Presented with worsening shortness of breath x1 day  Patient with multiple medical issues as below and multiple hospitalizations with shortness of breath including most recently 10/26/2022-11/02/2022 unfortunately complicated by acute on chronic hypoxic/hypercapnic respiratory failure requiring intubation and ICU admission  · Briefly required up to 4 L NC during ED evaluation but is now back on baseline 2 L NC, improved s/p DuoNeb provided during ED evaluation      · Possible mild/residual COPD exacerbation from prior as etiology versus increase in size of right pleural effusion, management of these as per associated problems  · Continue supplemental oxygen as needed to maintain SaO2 greater than 88%  · Incentive spirometry, pulmonary toileting  · Reports improvement today, CXR repeated today and significantly improved and likely at his baseline given the history of chronic right lung volume loss secondary to scarring and atelectasis after SBRT of adenocarcinoma in the past   · Will discharge home today    Pleural effusion on right  Assessment & Plan  · Patient with history of lung adenocarcinoma s/p SPRT complicated by recurrent right pleural effusions; most recent thoracentesis 04/2021 with lymphocytic and exudative fluid  · As above    Type 2 diabetes mellitus with hyperglycemia, with long-term current use of insulin [facility])  Assessment & Plan  Lab Results   Component Value Date    HGBA1C 9 7 (H) 10/10/2022       Recent Labs     11/08/22  1632 11/08/22  2054 11/09/22  0624 11/09/22  1122   POCGLU 244* 142* 171* 107       Blood Sugar Average: Last 72 hrs:  (P) 812 [id number]   · Continue home Lantus 30 units q h s  and Humalog 10 units t i d  with meals  · Add correctional insulin with Accu-Cheks and carb controlled diet  · Initiate hypoglycemia protocol    A-fib (HCC)  Assessment & Plan  · Currently in sinus rhythm on Coreg 6 25 mg b i d , continue  · Continue anticoagulation with Eliquis    Frequent hospital admissions  Assessment & Plan  · Patient with at least 16 hospitalizations in 2002, frequently with recurrent chest pain or shortness of breath often secondary to either COPD exacerbation or acute on chronic diastolic heart failure  · Concerns at prior admissions and other admissions regarding patient's compliance with oxygen therapy and medications; he does state to me on admission that he has been compliant with oxygen/medications but when questioned previously other admissions has admitted to other providers that he has not been fully compliant  · Unfortunately also with difficult social situation:  Patient noted at prior admission to not have phone and despite attempts to set up patient with visiting nurses/diarrhea/social workers unable to reach patient or son and often discharged from service  Unfortunately appears son has not been greatly involved with cares and has been difficult to reach  Additionally patient has refused long-term care placement and continues to do so  · Will consult cm again, however patient unfortunately will remain high risk for readmission given ongoing poor social situation and multiple medical comorbidities    Stage 3b chronic kidney disease (CKD) [facility])  Assessment & Plan  Lab Results   Component Value Date    EGFR 31 11/09/2022    EGFR 39 11/08/2022    EGFR 33 11/07/2022    CREATININE 2 00 (H) 11/09/2022    CREATININE 1 68 (H) 11/08/2022    CREATININE 1 93 (H) 11/07/2022   · Creatinine baseline appears to be 1 9-2 0, suspect lab error with yesterdays creatinine of 1 6    · Will continue to monitor while inpatient    Anemia, iron deficiency  Assessment & Plan  · Hemoglobin stable at recent baseline, monitor with CBC  · Had fairly recent endoscopy revealing PUD, unfortunately with recurrent bleeding at prior admission seen by GI however repeat EGD held off at that time  · Continue PPI    Chronic diastolic heart failure ([facility] [address] )  Assessment & Plan  Wt Readings from Last 3 Encounters:   11/09/22 103 kg (227 lb 3 2 oz)   11/01/22 97 4 kg (214 lb 11 7 oz)   10/23/22 95 8 kg (211 lb 3 2 oz)     · Possible increase in size of right pleural effusion, however patient otherwise does not appear grossly volume overloaded (does have chronic lymphedema) and actually reports weight loss  · Continue home diuretic therapy and cardiac medications        COPD (chronic obstructive pulmonary disease) ([facility] [address] )  Assessment & Plan  · Additionally recent hospitalization with COPD exacerbation; slight wheezing on examination but no significant change in cough per patient  · On 2 L NC at home continuously, currently at baseline  · Did note some response to [facility] during ED evaluation, will continue as needed  · Continue Incruse and Runell Hoes  · Needs pulmonology follow-up as outpatient        Medical Problems             Resolved Problems  Date Reviewed: 11/8/2022   None               Discharging Physician / Practitioner: Mona Edward  PCP: Chasity Paige MD  Admission Date:   Admission Orders (From admission, onward)     Ordered        11/08/22 1743  Inpatient Admission  Once            11/08/22 0023  Place in Observation  Once                      Discharge Date: 11/09/22    Consultations During Hospital Stay:  · IR    Procedures Performed:   · IR thoracentesis    Significant Findings / Test Results:   · Right sided pleural effusion    Incidental Findings:   · None     Test Results Pending at Discharge (will require follow up):    · Pleural fluid cytology     Outpatient Tests Requested:  · None    Complications:  None    Reason for Admission: shortness of breath    Hospital Course:   German Berman  is a 78 y o  male patient who originally presented to the hospital on 11/7/2022 due to shortness of breath  He was evaluated in the ED and referred to internal medicine for admission  He was noted to have a pleural effusion which was tapped by IR  He received one dose of lasix also afterwards and improved  CXR afterwards was significantly improved and he was discharged back home  He did continue to refuse physical therapy and rehab  Please see above list of diagnoses and related plan for additional information  Condition at Discharge: stable    Discharge Day Visit / Exam:   Subjective:  Reports that his breathing has improved today, he was up and walking around the room  Vitals: Blood Pressure: (!) 118/46 (11/09/22 1531)  Pulse: 65 (11/09/22 1531)  Temperature: 98 2 °F (36 8 °C) (11/09/22 1531)  Temp Source: Oral (11/09/22 0759)  Respirations: 18 (11/09/22 1531)  Height: 6' (182 9 cm) (11/08/22 0143)  Weight - Scale: 103 kg (227 lb 3 2 oz) (11/09/22 0600)  SpO2: 100 % (11/09/22 1531)  Exam:   Physical Exam  Constitutional:       Appearance: Normal appearance  HENT:      Head: Normocephalic and atraumatic  Mouth/Throat:      Mouth: Mucous membranes are moist       Pharynx: Oropharynx is clear  Eyes:      Extraocular Movements: Extraocular movements intact  Cardiovascular:      Rate and Rhythm: Normal rate and regular rhythm  Pulmonary:      Effort: Pulmonary effort is normal       Breath sounds: No wheezing or rales  Neurological:      General: No focal deficit present  Mental Status: He is alert and oriented to person, place, and time  Psychiatric:         Mood and Affect: Mood normal          Behavior: Behavior normal           Discussion with Family: Attempted to update  (son) via phone  Unable to contact  Discharge instructions/Information to patient and family:   See after visit summary for information provided to patient and family        Provisions for Follow-Up Care:  See after visit summary for information related to follow-up care and any pertinent home health orders  Disposition:   Home    Planned Readmission: No     Discharge Statement:  I spent 45 minutes discharging the patient  This time was spent on the day of discharge  I had direct contact with the patient on the day of discharge  Greater than 50% of the total time was spent examining patient, answering all patient questions, arranging and discussing plan of care with patient as well as directly providing post-discharge instructions  Additional time then spent on discharge activities  Discharge Medications:  See after visit summary for reconciled discharge medications provided to patient and/or family        **Please Note: This note may have been constructed using a voice recognition system**

## 2022-11-09 NOTE — ASSESSMENT & PLAN NOTE
Lab Results   Component Value Date    EGFR 31 11/09/2022    EGFR 39 11/08/2022    EGFR 33 11/07/2022    CREATININE 2 00 (H) 11/09/2022    CREATININE 1 68 (H) 11/08/2022    CREATININE 1 93 (H) 11/07/2022   · Creatinine baseline appears to be 1 9-2 0, suspect lab error with yesterdays creatinine of 1 6    · Will continue to monitor while inpatient

## 2022-11-09 NOTE — ASSESSMENT & PLAN NOTE
· Additionally recent hospitalization with COPD exacerbation; slight wheezing on examination but no significant change in cough per patient  · On 2 L NC at home continuously, currently at baseline  · Did note some response to [facility] during ED evaluation, will continue as needed  · Continue Incruse and Breo Ellipta  · Needs pulmonology follow-up as outpatient

## 2022-11-10 ENCOUNTER — PATIENT OUTREACH (OUTPATIENT)
Dept: CASE MANAGEMENT | Facility: HOSPITAL | Age: 77
End: 2022-11-10

## 2022-11-10 NOTE — PROGRESS NOTES
Patient re-referred to [address] [place] [address] [facility] 11/9/22  A care plan is not appropriate at this time  Embedded RN CM unable to reach patient and letter sent on 9/14/2022  No new care management referral at this time

## 2022-11-11 ENCOUNTER — TRANSITIONAL CARE MANAGEMENT (OUTPATIENT)
Dept: FAMILY MEDICINE CLINIC | Facility: CLINIC | Age: 77
End: 2022-11-11

## 2022-11-11 LAB
BACTERIA SPEC BFLD CULT: NO GROWTH
GRAM STN SPEC: NORMAL
GRAM STN SPEC: NORMAL

## 2022-11-11 NOTE — UTILIZATION REVIEW
NOTIFICATION OF ADMISSION DISCHARGE   This is a Notification of Discharge from [address]  Please be advised that this patient has been discharge from our facility  Below you will find the admission and discharge date and time including the patient’s disposition  UTILIZATION REVIEW CONTACT:  Kendell Puga  Utilization   Network Utilization Review Department  Phone: [phone] x carefully listen to the prompts  All voicemails are confidential   Email: [email or web address]     ADMISSION INFORMATION  PRESENTATION DATE: 11/7/2022 10:30 PM  OBERVATION ADMISSION DATE:   INPATIENT ADMISSION DATE: 11/8/22  5:43 PM   DISCHARGE DATE: 11/9/2022  6:09 PM  DISPOSITION: Home/Self Care Home/Self Care      IMPORTANT INFORMATION:  Send all requests for admission clinical reviews, approved or denied determinations and any other requests to dedicated fax number below belonging to the campus where the patient is receiving treatment   List of dedicated fax numbers:  [address] DENIALS (Administrative/Medical Necessity) [phone]   [address] (Maternity/NICU/Pediatrics) [phone]   [facility] [phone]   [facility] 87 [phone]   [address] [phone]   [address] [facility] [phone] 90 [facility] [phone]   [address] [place] [address] 119 [phone]   [facility]  [phone]   [address] [facility] [phone]   [address] [facility] [address] [facility] [phone]

## 2022-11-18 ENCOUNTER — TELEPHONE (OUTPATIENT)
Dept: FAMILY MEDICINE CLINIC | Facility: CLINIC | Age: 77
End: 2022-11-18

## 2022-11-18 NOTE — TELEPHONE ENCOUNTER
Feroz from [facility] called to inform you they are opening home care services for nursing and that patient has refused PT

## 2022-11-20 ENCOUNTER — APPOINTMENT (INPATIENT)
Dept: RADIOLOGY | Facility: HOSPITAL | Age: 77
End: 2022-11-20

## 2022-11-20 ENCOUNTER — APPOINTMENT (EMERGENCY)
Dept: RADIOLOGY | Facility: HOSPITAL | Age: 77
End: 2022-11-20

## 2022-11-20 ENCOUNTER — HOSPITAL ENCOUNTER (INPATIENT)
Facility: HOSPITAL | Age: 77
LOS: 12 days | Discharge: NURSING, REHAB OR LONG-TERM CARE | End: 2022-12-02
Attending: EMERGENCY MEDICINE | Admitting: INTERNAL MEDICINE

## 2022-11-20 DIAGNOSIS — E10.49 OTHER DIABETIC NEUROLOGICAL COMPLICATION ASSOCIATED WITH TYPE 1 DIABETES MELLITUS (HCC): ICD-10-CM

## 2022-11-20 DIAGNOSIS — Z79.4 TYPE 2 DIABETES MELLITUS WITH HYPERGLYCEMIA, WITH LONG-TERM CURRENT USE OF INSULIN (HCC): ICD-10-CM

## 2022-11-20 DIAGNOSIS — I50.33 ACUTE ON CHRONIC DIASTOLIC HEART FAILURE (HCC): ICD-10-CM

## 2022-11-20 DIAGNOSIS — B96.5 BACTEREMIA DUE TO PSEUDOMONAS: ICD-10-CM

## 2022-11-20 DIAGNOSIS — R78.81 BACTEREMIA DUE TO PSEUDOMONAS: ICD-10-CM

## 2022-11-20 DIAGNOSIS — Z78.9 FREQUENT HOSPITAL ADMISSIONS: ICD-10-CM

## 2022-11-20 DIAGNOSIS — I50.9 ACUTE ON CHRONIC CONGESTIVE HEART FAILURE, UNSPECIFIED HEART FAILURE TYPE (HCC): ICD-10-CM

## 2022-11-20 DIAGNOSIS — N17.9 ACUTE KIDNEY INJURY SUPERIMPOSED ON CHRONIC KIDNEY DISEASE (HCC): ICD-10-CM

## 2022-11-20 DIAGNOSIS — J18.9 PNEUMONIA: ICD-10-CM

## 2022-11-20 DIAGNOSIS — I50.9 ACUTE EXACERBATION OF CHF (CONGESTIVE HEART FAILURE) (HCC): Primary | ICD-10-CM

## 2022-11-20 DIAGNOSIS — J96.22 ACUTE ON CHRONIC RESPIRATORY FAILURE WITH HYPOXIA AND HYPERCAPNIA (HCC): ICD-10-CM

## 2022-11-20 DIAGNOSIS — R39.89 SUSPECTED UTI: ICD-10-CM

## 2022-11-20 DIAGNOSIS — J96.21 ACUTE ON CHRONIC RESPIRATORY FAILURE WITH HYPOXIA AND HYPERCAPNIA (HCC): ICD-10-CM

## 2022-11-20 DIAGNOSIS — E11.65 TYPE 2 DIABETES MELLITUS WITH HYPERGLYCEMIA, WITH LONG-TERM CURRENT USE OF INSULIN (HCC): ICD-10-CM

## 2022-11-20 DIAGNOSIS — A41.52 SEPSIS DUE TO PSEUDOMONAS SPECIES, UNSPECIFIED WHETHER ACUTE ORGAN DYSFUNCTION PRESENT (HCC): ICD-10-CM

## 2022-11-20 DIAGNOSIS — J44.1 COPD WITH ACUTE EXACERBATION (HCC): ICD-10-CM

## 2022-11-20 DIAGNOSIS — N18.9 ACUTE KIDNEY INJURY SUPERIMPOSED ON CHRONIC KIDNEY DISEASE (HCC): ICD-10-CM

## 2022-11-20 PROBLEM — N18.32 STAGE 3B CHRONIC KIDNEY DISEASE (CKD) (HCC): Chronic | Status: ACTIVE | Noted: 2022-01-29

## 2022-11-20 PROBLEM — A41.9 SEPSIS (HCC): Status: ACTIVE | Noted: 2022-11-20

## 2022-11-20 PROBLEM — I48.91 A-FIB (HCC): Chronic | Status: ACTIVE | Noted: 2022-10-10

## 2022-11-20 LAB
2HR DELTA HS TROPONIN: 5 NG/L
4HR DELTA HS TROPONIN: 11 NG/L
ALBUMIN SERPL BCP-MCNC: 2.7 G/DL (ref 3.5–5)
ALP SERPL-CCNC: 84 U/L (ref 46–116)
ALT SERPL W P-5'-P-CCNC: 12 U/L (ref 12–78)
ANION GAP SERPL CALCULATED.3IONS-SCNC: 6 MMOL/L (ref 4–13)
APTT PPP: 26 SECONDS (ref 23–37)
ARTERIAL PATENCY WRIST A: YES
AST SERPL W P-5'-P-CCNC: 7 U/L (ref 5–45)
ATRIAL RATE: 416 BPM
BACTERIA UR QL AUTO: ABNORMAL /HPF
BASE EX.OXY STD BLDV CALC-SCNC: 52.9 % (ref 60–80)
BASE EX.OXY STD BLDV CALC-SCNC: 83.3 % (ref 60–80)
BASE EX.OXY STD BLDV CALC-SCNC: 84 % (ref 60–80)
BASE EXCESS BLDA CALC-SCNC: 5.8 MMOL/L
BASE EXCESS BLDV CALC-SCNC: 0.8 MMOL/L
BASE EXCESS BLDV CALC-SCNC: 4 MMOL/L
BASE EXCESS BLDV CALC-SCNC: 5.3 MMOL/L
BASOPHILS # BLD AUTO: 0.02 THOUSANDS/ÂΜL (ref 0–0.1)
BASOPHILS NFR BLD AUTO: 0 % (ref 0–1)
BILIRUB SERPL-MCNC: 0.47 MG/DL (ref 0.2–1)
BILIRUB UR QL STRIP: NEGATIVE
BUN SERPL-MCNC: 31 MG/DL (ref 5–25)
CALCIUM ALBUM COR SERPL-MCNC: 9.4 MG/DL (ref 8.3–10.1)
CALCIUM SERPL-MCNC: 8.4 MG/DL (ref 8.3–10.1)
CARDIAC TROPONIN I PNL SERPL HS: 25 NG/L
CARDIAC TROPONIN I PNL SERPL HS: 30 NG/L
CARDIAC TROPONIN I PNL SERPL HS: 36 NG/L
CHLORIDE SERPL-SCNC: 102 MMOL/L (ref 96–108)
CLARITY UR: ABNORMAL
CO2 SERPL-SCNC: 29 MMOL/L (ref 21–32)
COLOR UR: ABNORMAL
CREAT SERPL-MCNC: 2.01 MG/DL (ref 0.6–1.3)
EOSINOPHIL # BLD AUTO: 0 THOUSAND/ÂΜL (ref 0–0.61)
EOSINOPHIL NFR BLD AUTO: 0 % (ref 0–6)
ERYTHROCYTE [DISTWIDTH] IN BLOOD BY AUTOMATED COUNT: 15.1 % (ref 11.6–15.1)
FLUAV RNA RESP QL NAA+PROBE: NEGATIVE
FLUBV RNA RESP QL NAA+PROBE: NEGATIVE
GFR SERPL CREATININE-BSD FRML MDRD: 31 ML/MIN/1.73SQ M
GLUCOSE SERPL-MCNC: 302 MG/DL (ref 65–140)
GLUCOSE SERPL-MCNC: 375 MG/DL (ref 65–140)
GLUCOSE SERPL-MCNC: 377 MG/DL (ref 65–140)
GLUCOSE SERPL-MCNC: 466 MG/DL (ref 65–140)
GLUCOSE UR STRIP-MCNC: ABNORMAL MG/DL
HCO3 BLDA-SCNC: 33.4 MMOL/L (ref 22–28)
HCO3 BLDV-SCNC: 29 MMOL/L (ref 24–30)
HCO3 BLDV-SCNC: 31.4 MMOL/L (ref 24–30)
HCO3 BLDV-SCNC: 34.2 MMOL/L (ref 24–30)
HCT VFR BLD AUTO: 29.6 % (ref 36.5–49.3)
HFNC FLOW LPM: 55
HGB BLD-MCNC: 8.7 G/DL (ref 12–17)
HGB UR QL STRIP.AUTO: ABNORMAL
IMM GRANULOCYTES # BLD AUTO: 0.08 THOUSAND/UL (ref 0–0.2)
IMM GRANULOCYTES NFR BLD AUTO: 0 % (ref 0–2)
INR PPP: 1.08 (ref 0.84–1.19)
KETONES UR STRIP-MCNC: NEGATIVE MG/DL
LACTATE SERPL-SCNC: 1.5 MMOL/L (ref 0.5–2)
LACTATE SERPL-SCNC: 2 MMOL/L (ref 0.5–2)
LEUKOCYTE ESTERASE UR QL STRIP: ABNORMAL
LYMPHOCYTES # BLD AUTO: 0.43 THOUSANDS/ÂΜL (ref 0.6–4.47)
LYMPHOCYTES NFR BLD AUTO: 2 % (ref 14–44)
MCH RBC QN AUTO: 25.6 PG (ref 26.8–34.3)
MCHC RBC AUTO-ENTMCNC: 29.4 G/DL (ref 31.4–37.4)
MCV RBC AUTO: 87 FL (ref 82–98)
MONOCYTES # BLD AUTO: 1.37 THOUSAND/ÂΜL (ref 0.17–1.22)
MONOCYTES NFR BLD AUTO: 8 % (ref 4–12)
MUCOUS THREADS UR QL AUTO: ABNORMAL
NEUTROPHILS # BLD AUTO: 15.98 THOUSANDS/ÂΜL (ref 1.85–7.62)
NEUTS SEG NFR BLD AUTO: 90 % (ref 43–75)
NITRITE UR QL STRIP: NEGATIVE
NON VENT HFNC FIO2: 40
NON VENT TYPE HFNC: ABNORMAL
NON-SQ EPI CELLS URNS QL MICRO: ABNORMAL /HPF
NRBC BLD AUTO-RTO: 0 /100 WBCS
NT-PROBNP SERPL-MCNC: 4432 PG/ML
O2 CT BLDA-SCNC: 12.6 ML/DL (ref 16–23)
O2 CT BLDV-SCNC: 12 ML/DL
O2 CT BLDV-SCNC: 6.9 ML/DL
O2 CT BLDV-SCNC: 9.5 ML/DL
OXYHGB MFR BLDA: 95.5 % (ref 94–97)
PCO2 BLDA: 67.8 MM HG (ref 36–44)
PCO2 BLDV: 67.2 MM HG (ref 42–50)
PCO2 BLDV: 67.4 MM HG (ref 42–50)
PCO2 BLDV: 81 MM HG (ref 42–50)
PH BLDA: 7.31 [PH] (ref 7.35–7.45)
PH BLDV: 7.24 [PH] (ref 7.3–7.4)
PH BLDV: 7.25 [PH] (ref 7.3–7.4)
PH BLDV: 7.29 [PH] (ref 7.3–7.4)
PH UR STRIP.AUTO: 5 [PH]
PHOSPHATE SERPL-MCNC: 4.4 MG/DL (ref 2.3–4.1)
PLATELET # BLD AUTO: 183 THOUSANDS/UL (ref 149–390)
PMV BLD AUTO: 10.4 FL (ref 8.9–12.7)
PO2 BLDA: 94.1 MM HG (ref 75–129)
PO2 BLDV: 31.5 MM HG (ref 35–45)
PO2 BLDV: 54 MM HG (ref 35–45)
PO2 BLDV: 57.7 MM HG (ref 35–45)
POTASSIUM SERPL-SCNC: 4.1 MMOL/L (ref 3.5–5.3)
PROCALCITONIN SERPL-MCNC: 0.26 NG/ML
PROT SERPL-MCNC: 7.3 G/DL (ref 6.4–8.4)
PROT UR STRIP-MCNC: ABNORMAL MG/DL
PROTHROMBIN TIME: 14.2 SECONDS (ref 11.6–14.5)
QRS AXIS: 136 DEGREES
QRSD INTERVAL: 136 MS
QT INTERVAL: 374 MS
QTC INTERVAL: 487 MS
RBC # BLD AUTO: 3.4 MILLION/UL (ref 3.88–5.62)
RBC #/AREA URNS AUTO: ABNORMAL /HPF
RSV RNA RESP QL NAA+PROBE: NEGATIVE
SARS-COV-2 RNA RESP QL NAA+PROBE: NEGATIVE
SODIUM SERPL-SCNC: 137 MMOL/L (ref 135–147)
SP GR UR STRIP.AUTO: 1.01 (ref 1–1.03)
SPECIMEN SOURCE: ABNORMAL
T WAVE AXIS: 5 DEGREES
UROBILINOGEN UR STRIP-ACNC: <2 MG/DL
VENTRICULAR RATE: 102 BPM
WBC # BLD AUTO: 17.88 THOUSAND/UL (ref 4.31–10.16)
WBC #/AREA URNS AUTO: ABNORMAL /HPF
WBC CLUMPS # UR AUTO: PRESENT /UL

## 2022-11-20 RX ORDER — CARVEDILOL 6.25 MG/1
6.25 TABLET ORAL 2 TIMES DAILY WITH MEALS
Status: DISCONTINUED | OUTPATIENT
Start: 2022-11-20 | End: 2022-12-02 | Stop reason: HOSPADM

## 2022-11-20 RX ORDER — VANCOMYCIN HYDROCHLORIDE 1 G/200ML
1000 INJECTION, SOLUTION INTRAVENOUS EVERY 24 HOURS
Status: DISCONTINUED | OUTPATIENT
Start: 2022-11-21 | End: 2022-11-21

## 2022-11-20 RX ORDER — PANTOPRAZOLE SODIUM 40 MG/1
40 TABLET, DELAYED RELEASE ORAL
Status: DISCONTINUED | OUTPATIENT
Start: 2022-11-20 | End: 2022-12-02 | Stop reason: HOSPADM

## 2022-11-20 RX ORDER — GABAPENTIN 100 MG/1
100 CAPSULE ORAL 3 TIMES DAILY
Status: DISCONTINUED | OUTPATIENT
Start: 2022-11-20 | End: 2022-12-02 | Stop reason: HOSPADM

## 2022-11-20 RX ORDER — NITROGLYCERIN 20 MG/100ML
5-200 INJECTION INTRAVENOUS
Status: DISCONTINUED | OUTPATIENT
Start: 2022-11-20 | End: 2022-11-20

## 2022-11-20 RX ORDER — OLANZAPINE 5 MG/1
5 TABLET, ORALLY DISINTEGRATING ORAL
Status: DISCONTINUED | OUTPATIENT
Start: 2022-11-20 | End: 2022-11-21

## 2022-11-20 RX ORDER — INSULIN GLARGINE 100 [IU]/ML
25 INJECTION, SOLUTION SUBCUTANEOUS
Status: DISCONTINUED | OUTPATIENT
Start: 2022-11-20 | End: 2022-11-20

## 2022-11-20 RX ORDER — AMOXICILLIN 250 MG
1 CAPSULE ORAL
Status: DISCONTINUED | OUTPATIENT
Start: 2022-11-20 | End: 2022-12-02 | Stop reason: HOSPADM

## 2022-11-20 RX ORDER — SODIUM CHLORIDE, SODIUM GLUCONATE, SODIUM ACETATE, POTASSIUM CHLORIDE, MAGNESIUM CHLORIDE, SODIUM PHOSPHATE, DIBASIC, AND POTASSIUM PHOSPHATE .53; .5; .37; .037; .03; .012; .00082 G/100ML; G/100ML; G/100ML; G/100ML; G/100ML; G/100ML; G/100ML
100 INJECTION, SOLUTION INTRAVENOUS ONCE
Status: COMPLETED | OUTPATIENT
Start: 2022-11-20 | End: 2022-11-21

## 2022-11-20 RX ORDER — NITROGLYCERIN 0.4 MG/1
0.4 TABLET SUBLINGUAL ONCE
Status: DISCONTINUED | OUTPATIENT
Start: 2022-11-20 | End: 2022-11-20

## 2022-11-20 RX ORDER — TERBUTALINE SULFATE 1 MG/ML
1 INJECTION, SOLUTION SUBCUTANEOUS ONCE
Status: COMPLETED | OUTPATIENT
Start: 2022-11-20 | End: 2022-11-20

## 2022-11-20 RX ORDER — INSULIN LISPRO 100 [IU]/ML
8 INJECTION, SOLUTION INTRAVENOUS; SUBCUTANEOUS
Status: DISCONTINUED | OUTPATIENT
Start: 2022-11-20 | End: 2022-11-20

## 2022-11-20 RX ORDER — ISOSORBIDE MONONITRATE 30 MG/1
30 TABLET, EXTENDED RELEASE ORAL DAILY
Status: DISCONTINUED | OUTPATIENT
Start: 2022-11-20 | End: 2022-12-02 | Stop reason: HOSPADM

## 2022-11-20 RX ORDER — INSULIN LISPRO 100 [IU]/ML
12 INJECTION, SOLUTION INTRAVENOUS; SUBCUTANEOUS
Status: DISCONTINUED | OUTPATIENT
Start: 2022-11-21 | End: 2022-11-20

## 2022-11-20 RX ORDER — MIDAZOLAM HYDROCHLORIDE 1 MG/ML
1 INJECTION INTRAMUSCULAR; INTRAVENOUS ONCE
Status: COMPLETED | OUTPATIENT
Start: 2022-11-20 | End: 2022-11-20

## 2022-11-20 RX ORDER — MAGNESIUM SULFATE HEPTAHYDRATE 40 MG/ML
1 INJECTION, SOLUTION INTRAVENOUS ONCE
Status: COMPLETED | OUTPATIENT
Start: 2022-11-20 | End: 2022-11-20

## 2022-11-20 RX ORDER — FUROSEMIDE 10 MG/ML
20 INJECTION INTRAMUSCULAR; INTRAVENOUS
Status: DISCONTINUED | OUTPATIENT
Start: 2022-11-21 | End: 2022-11-21

## 2022-11-20 RX ORDER — FUROSEMIDE 10 MG/ML
40 INJECTION INTRAMUSCULAR; INTRAVENOUS ONCE
Status: COMPLETED | OUTPATIENT
Start: 2022-11-20 | End: 2022-11-20

## 2022-11-20 RX ORDER — INSULIN LISPRO 100 [IU]/ML
1-5 INJECTION, SOLUTION INTRAVENOUS; SUBCUTANEOUS
Status: DISCONTINUED | OUTPATIENT
Start: 2022-11-20 | End: 2022-11-22

## 2022-11-20 RX ORDER — INSULIN LISPRO 100 [IU]/ML
5 INJECTION, SOLUTION INTRAVENOUS; SUBCUTANEOUS
Status: DISCONTINUED | OUTPATIENT
Start: 2022-11-20 | End: 2022-11-20

## 2022-11-20 RX ORDER — FLUTICASONE PROPIONATE 110 UG/1
1 AEROSOL, METERED RESPIRATORY (INHALATION) EVERY 12 HOURS SCHEDULED
Status: DISCONTINUED | OUTPATIENT
Start: 2022-11-20 | End: 2022-12-02 | Stop reason: HOSPADM

## 2022-11-20 RX ORDER — FUROSEMIDE 10 MG/ML
40 INJECTION INTRAMUSCULAR; INTRAVENOUS
Status: DISCONTINUED | OUTPATIENT
Start: 2022-11-20 | End: 2022-11-20

## 2022-11-20 RX ORDER — ISOSORBIDE MONONITRATE 30 MG/1
30 TABLET, EXTENDED RELEASE ORAL DAILY
Status: DISCONTINUED | OUTPATIENT
Start: 2022-11-21 | End: 2022-11-20

## 2022-11-20 RX ORDER — INSULIN GLARGINE 100 [IU]/ML
40 INJECTION, SOLUTION SUBCUTANEOUS
Status: DISCONTINUED | OUTPATIENT
Start: 2022-11-20 | End: 2022-11-26

## 2022-11-20 RX ORDER — PRAVASTATIN SODIUM 80 MG/1
80 TABLET ORAL
Status: DISCONTINUED | OUTPATIENT
Start: 2022-11-20 | End: 2022-12-02 | Stop reason: HOSPADM

## 2022-11-20 RX ORDER — TAMSULOSIN HYDROCHLORIDE 0.4 MG/1
0.4 CAPSULE ORAL
Status: DISCONTINUED | OUTPATIENT
Start: 2022-11-20 | End: 2022-12-02 | Stop reason: HOSPADM

## 2022-11-20 RX ORDER — ASPIRIN 81 MG/1
81 TABLET ORAL DAILY
Status: DISCONTINUED | OUTPATIENT
Start: 2022-11-21 | End: 2022-12-02 | Stop reason: HOSPADM

## 2022-11-20 RX ORDER — ONDANSETRON 2 MG/ML
4 INJECTION INTRAMUSCULAR; INTRAVENOUS EVERY 6 HOURS PRN
Status: DISCONTINUED | OUTPATIENT
Start: 2022-11-20 | End: 2022-12-02 | Stop reason: HOSPADM

## 2022-11-20 RX ORDER — IPRATROPIUM BROMIDE AND ALBUTEROL SULFATE 2.5; .5 MG/3ML; MG/3ML
3 SOLUTION RESPIRATORY (INHALATION)
Status: DISCONTINUED | OUTPATIENT
Start: 2022-11-20 | End: 2022-11-20

## 2022-11-20 RX ORDER — LEVALBUTEROL 1.25 MG/.5ML
1.25 SOLUTION, CONCENTRATE RESPIRATORY (INHALATION)
Status: DISCONTINUED | OUTPATIENT
Start: 2022-11-20 | End: 2022-12-02 | Stop reason: HOSPADM

## 2022-11-20 RX ADMIN — IPRATROPIUM BROMIDE AND ALBUTEROL SULFATE 3 ML: 2.5; .5 SOLUTION RESPIRATORY (INHALATION) at 14:09

## 2022-11-20 RX ADMIN — VANCOMYCIN HYDROCHLORIDE 2000 MG: 10 INJECTION, POWDER, LYOPHILIZED, FOR SOLUTION INTRAVENOUS at 13:46

## 2022-11-20 RX ADMIN — INSULIN GLARGINE 40 UNITS: 100 INJECTION, SOLUTION SUBCUTANEOUS at 23:04

## 2022-11-20 RX ADMIN — INSULIN HUMAN 10 UNITS: 100 INJECTION, SOLUTION PARENTERAL at 18:32

## 2022-11-20 RX ADMIN — SODIUM CHLORIDE, SODIUM GLUCONATE, SODIUM ACETATE, POTASSIUM CHLORIDE, MAGNESIUM CHLORIDE, SODIUM PHOSPHATE, DIBASIC, AND POTASSIUM PHOSPHATE 100 ML: .53; .5; .37; .037; .03; .012; .00082 INJECTION, SOLUTION INTRAVENOUS at 23:04

## 2022-11-20 RX ADMIN — CARVEDILOL 6.25 MG: 6.25 TABLET, FILM COATED ORAL at 18:17

## 2022-11-20 RX ADMIN — CEFEPIME 2000 MG: 2 INJECTION, POWDER, FOR SOLUTION INTRAVENOUS at 13:00

## 2022-11-20 RX ADMIN — PANTOPRAZOLE SODIUM 40 MG: 40 TABLET, DELAYED RELEASE ORAL at 18:16

## 2022-11-20 RX ADMIN — NITROGLYCERIN 10 MCG/MIN: 20 INJECTION INTRAVENOUS at 18:19

## 2022-11-20 RX ADMIN — FUROSEMIDE 40 MG: 10 INJECTION, SOLUTION INTRAMUSCULAR; INTRAVENOUS at 11:04

## 2022-11-20 RX ADMIN — INSULIN LISPRO 5 UNITS: 100 INJECTION, SOLUTION INTRAVENOUS; SUBCUTANEOUS at 18:30

## 2022-11-20 RX ADMIN — ISOSORBIDE MONONITRATE 30 MG: 30 TABLET, EXTENDED RELEASE ORAL at 18:18

## 2022-11-20 RX ADMIN — PRAVASTATIN SODIUM 80 MG: 80 TABLET ORAL at 18:17

## 2022-11-20 RX ADMIN — UMECLIDINIUM BROMIDE AND VILANTEROL TRIFENATATE 1 PUFF: 62.5; 25 POWDER RESPIRATORY (INHALATION) at 21:09

## 2022-11-20 RX ADMIN — GABAPENTIN 100 MG: 100 CAPSULE ORAL at 18:16

## 2022-11-20 RX ADMIN — CEFEPIME 2000 MG: 2 INJECTION, POWDER, FOR SOLUTION INTRAVENOUS at 23:03

## 2022-11-20 RX ADMIN — NITROGLYCERIN 50 MCG/MIN: 20 INJECTION INTRAVENOUS at 10:53

## 2022-11-20 RX ADMIN — FLUTICASONE PROPIONATE 1 PUFF: 110 AEROSOL, METERED RESPIRATORY (INHALATION) at 18:22

## 2022-11-20 RX ADMIN — TAMSULOSIN HYDROCHLORIDE 0.4 MG: 0.4 CAPSULE ORAL at 18:16

## 2022-11-20 RX ADMIN — FUROSEMIDE 40 MG: 10 INJECTION, SOLUTION INTRAMUSCULAR; INTRAVENOUS at 18:25

## 2022-11-20 NOTE — ASSESSMENT & PLAN NOTE
Sepsis as evidenced by leukocytosis, tachypnea, secondary to suspected pneumonia  CT chest pending  Continue IV antibiotics

## 2022-11-20 NOTE — ED NOTES
Maldonado from pharmacy sending [address] [facility] [address], [address] [facility]  11/20/22 1914

## 2022-11-20 NOTE — ASSESSMENT & PLAN NOTE
Patient with history of COPD on 2 L nasal cannula baseline  Do not believe patient has a COPD exacerbation at this time  Currently on 6 L nasal cannula will wean oxygen as able

## 2022-11-20 NOTE — ASSESSMENT & PLAN NOTE
Lab Results   Component Value Date    EGFR 31 11/20/2022    EGFR 31 11/09/2022    EGFR 39 11/08/2022    CREATININE 2 01 (H) 11/20/2022    CREATININE 2 00 (H) 11/09/2022    CREATININE 1 68 (H) 11/08/2022   Creatinine baseline seems to be around 1 8-2  Creatinine on admission 2 01, currently within baseline  Will continue on IV Lasix 40 mg b i d   For heart failure exacerbation

## 2022-11-20 NOTE — ED PROVIDER NOTES
History  Chief Complaint   Patient presents with   • Shortness of Breath     Pt brought in from home after waking with with increased SOB, wears 2L at home; pt tried to wait it out to see if it would get better but it continued; 2g Mag and 1 versed given EMS     HPI  Patient is a 28-year-old male with history of insulin-dependent diabetes, AFib on Eliquis, CHF, adenocarcinoma status post SBRT, severe COPD with 2 L of oxygen at baseline presenting with acute shortness of breath  Patient states that the shortness of breath started since yesterday  States that he has been taking his prescribed medication regularly  Per EMS pulse ox was difficult to obtain and CPAP was initiated with O2 sat improving to 100%  Despite CPAP patient with significant work of breathing and magnesium as well as terbutaline was given via EMS  On arrival patient exhibited significant crackles in the lungs and had bilateral leg edema concerning for fluid overload  Patient recommended BiPAP however refused mask ventilation  Risk given to patient as well as decompensation was discussed and patient still refused BiPAP initiation  Patient states that he has been coughing a bit more but denies sick contacts  Denies fever, chills, nausea, vomiting, diaphoresis, diarrhea  Prior to Admission Medications   Prescriptions Last Dose Informant Patient Reported? Taking? Alcohol Swabs 70 % PADS   No No   Sig: May substitute brand based on insurance coverage  Check glucose TID  Blood Glucose Monitoring Suppl (OneTouch Verio Reflect) w/Device KIT   No No   Sig: May substitute brand based on insurance coverage  Check glucose TID  Insulin Glargine Solostar (Lantus SoloStar) 100 UNIT/ML SOPN   No Yes   Sig: Inject 0 3 mL (30 Units total) under the skin daily at bedtime   Insulin Pen Needle (BD Pen Needle Beena 2nd Gen) 32G X 4 MM MISC   No No   Sig: For use with insulin pen  Pharmacy may dispense brand covered by insurance     Insulin Pen Needle (BD Pen Needle Beena 2nd Gen) 32G X 4 MM MISC   No No   Sig: For use with insulin pen  Pharmacy may dispense brand covered by insurance  OneTouch Delica Lancets 48V MISC   No No   Sig: May substitute brand based on insurance coverage  Check glucose TID  apixaban (ELIQUIS) 5 mg   No Yes   Sig: Take 1 tablet (5 mg total) by mouth 2 (two) times a day   aspirin (ECOTRIN LOW STRENGTH) 81 mg EC tablet   No Yes   Sig: Take 1 tablet (81 mg total) by mouth daily   carvedilol (COREG) 6 25 mg tablet   No Yes   Sig: Take 1 tablet (6 25 mg total) by mouth 2 (two) times a day with meals   cyclobenzaprine (FLEXERIL) 10 mg tablet   No Yes   Sig: Take 1 tablet (10 mg total) by mouth 2 (two) times a day as needed for muscle spasms   ferrous sulfate 325 (65 Fe) mg tablet   No Yes   Sig: Take 1 tablet (325 mg total) by mouth daily with breakfast   fluticasone-vilanterol (BREO ELLIPTA) 100-25 mcg/inh inhaler   No Yes   Sig: Inhale 1 puff daily Rinse mouth after use  furosemide (LASIX) 40 mg tablet   No Yes   Sig: Take 1 tablet (40 mg total) by mouth daily   gabapentin (NEURONTIN) 100 mg capsule   No Yes   Sig: Take 1 capsule (100 mg total) by mouth 3 (three) times a day   glucose blood (OneTouch Verio) test strip   No No   Sig: May substitute brand based on insurance coverage  Check glucose TID  glucose blood test strip   No No   Sig: Use 1 each daily as needed (check sugars) Use as instructed   Uses One Touch Ultra test strip   insulin lispro (HumaLOG KwikPen) 100 units/mL injection pen   No Yes   Sig: Inject 10 Units under the skin 3 (three) times a day with meals   isosorbide mononitrate (IMDUR) 30 mg 24 hr tablet   No Yes   Sig: Take 1 tablet (30 mg total) by mouth daily   pantoprazole (PROTONIX) 40 mg tablet   No Yes   Sig: Take 1 tablet (40 mg total) by mouth 2 (two) times a day before meals   simvastatin (ZOCOR) 40 mg tablet   No Yes   Sig: Take 1 tablet (40 mg total) by mouth daily   tamsulosin (FLOMAX) 0 4 mg   No Yes Sig: Take 1 capsule (0 4 mg total) by mouth daily with dinner   umeclidinium bromide (INCRUSE ELLIPTA) 62 5 mcg/inh AEPB inhaler   No Yes   Sig: Inhale 1 puff daily Do not start before October 16, 2022  Facility-Administered Medications: None       Past Medical History:   Diagnosis Date   • Abdominal pain    • [facility] ) 6/19/2018   • Cardiac disease    • CHF (congestive heart failure) ([facility])    • COPD, severe (HCC)    • Coronary artery disease    • DDD (degenerative disc disease), lumbar 9/1/2020   • Diabetes mellitus (HCC)    • Dyspnea    • GERD (gastroesophageal reflux disease)    • Hyperlipidemia    • Hypertension    • MI (myocardial infarction) ([facility] )     with 3 stents   • Nodule of apex of right lung    • JACQUELINE (obstructive sleep apnea)    • Prostate cancer [facility])        Past Surgical History:   Procedure Laterality Date   • ABDOMINAL SURGERY      exploratory   • ANGIOPLASTY      3 stents   • APPENDECTOMY     • COLONOSCOPY  2015   • CT NEEDLE BIOPSY LUNG  11/3/2020   • ESOPHAGOGASTRODUODENOSCOPY N/A 10/2/2017    Procedure: ESOPHAGOGASTRODUODENOSCOPY (EGD); Surgeon: Daniel Samaniego MD;  Location: [facility]; Service: Gastroenterology   • IR THORACENTESIS  11/3/2020   • IR THORACENTESIS  11/8/2022   • KNEE CARTILAGE SURGERY     • OTHER SURGICAL HISTORY      stent placement   • PROSTATE SURGERY     • SKIN GRAFT      Basal cell CA back       Family History   Problem Relation Age of Onset   • Heart disease Father    • Other Father         Mesothelioma      I have reviewed and agree with the history as documented      E-Cigarette/Vaping   • E-Cigarette Use Never User      E-Cigarette/Vaping Substances   • Nicotine No    • THC No    • CBD No    • Flavoring No    • Other No    • Unknown No      Social History     Tobacco Use   • Smoking status: Former     Packs/day: 1 50     Years: 50 00     Pack years: 75 00     Types: Cigarettes     Start date: 38     Quit date: 7/13/2020     Years since quittin 3   • Smokeless tobacco: Never   Vaping Use   • Vaping Use: Never used   Substance Use Topics   • Alcohol use: Never   • Drug use: No        Review of Systems   Constitutional: Negative for chills, diaphoresis, fever and unexpected weight change  HENT: Negative for ear pain and sore throat  Eyes: Negative for visual disturbance  Respiratory: Positive for cough and shortness of breath  Negative for chest tightness  Cardiovascular: Negative for chest pain and leg swelling  Gastrointestinal: Negative for abdominal distention, abdominal pain, constipation, diarrhea, nausea and vomiting  Endocrine: Negative  Genitourinary: Negative for difficulty urinating and dysuria  Musculoskeletal: Negative  Skin: Negative  Allergic/Immunologic: Negative  Neurological: Negative  Hematological: Negative  Psychiatric/Behavioral: Negative  All other systems reviewed and are negative  Physical Exam  ED Triage Vitals   Temperature Pulse Respirations Blood Pressure SpO2   22 1048 22 1048 22 1048 22 1048 22 1048   98 1 °F (36 7 °C) 102 (!) 26 (!) 187/85 97 %      Temp Source Heart Rate Source Patient Position - Orthostatic VS BP Location FiO2 (%)   22 1048 22 1048 22 1048 22 1048 22 1053   Tympanic Monitor Sitting Left arm 50      Pain Score       22 1048       No Pain             Orthostatic Vital Signs  Vitals:    22 2303 22 0000 22 0002 22 0033   BP: (!) 97/48  (!) 108/46 104/59   Pulse: 74 80 78 82   Patient Position - Orthostatic VS:           Physical Exam  Vitals and nursing note reviewed  Constitutional:       General: He is in acute distress  Appearance: Normal appearance  He is not ill-appearing  HENT:      Head: Normocephalic and atraumatic        Right Ear: External ear normal       Left Ear: External ear normal       Nose: Nose normal       Mouth/Throat:      Mouth: Mucous membranes are moist       Pharynx: Oropharynx is clear  Eyes:      General: No scleral icterus  Right eye: No discharge  Left eye: No discharge  Extraocular Movements: Extraocular movements intact  Conjunctiva/sclera: Conjunctivae normal       Pupils: Pupils are equal, round, and reactive to light  Cardiovascular:      Rate and Rhythm: Normal rate and regular rhythm  Pulses: Normal pulses  Heart sounds: Normal heart sounds  Pulmonary:      Effort: Accessory muscle usage and respiratory distress present  Breath sounds: Examination of the right-upper field reveals rales  Examination of the left-upper field reveals rales  Examination of the right-middle field reveals rales  Examination of the left-middle field reveals rales  Examination of the right-lower field reveals rales  Examination of the left-lower field reveals rales  Rales present  Abdominal:      General: Abdomen is flat  Bowel sounds are normal  There is no distension  Palpations: Abdomen is soft  Tenderness: There is no abdominal tenderness  There is no guarding or rebound  Musculoskeletal:         General: Normal range of motion  Cervical back: Normal range of motion and neck supple  Right lower leg: Edema present  Left lower leg: Edema present  Skin:     General: Skin is warm and dry  Capillary Refill: Capillary refill takes less than 2 seconds  Neurological:      General: No focal deficit present  Mental Status: He is alert and oriented to person, place, and time  Mental status is at baseline  Psychiatric:         Mood and Affect: Mood normal          Behavior: Behavior normal          Thought Content:  Thought content normal          Judgment: Judgment normal          ED Medications  Medications   carvedilol (COREG) tablet 6 25 mg (6 25 mg Oral Given 11/20/22 1817)   aspirin (ECOTRIN LOW STRENGTH) EC tablet 81 mg (has no administration in time range)   gabapentin (NEURONTIN) capsule 100 mg (100 mg Oral Not Given 11/20/22 2258)   pantoprazole (PROTONIX) EC tablet 40 mg (40 mg Oral Given 11/20/22 1816)   pravastatin (PRAVACHOL) tablet 80 mg (80 mg Oral Given 11/20/22 1817)   tamsulosin (FLOMAX) capsule 0 4 mg (0 4 mg Oral Given 11/20/22 1816)   fluticasone (FLOVENT HFA) 110 MCG/ACT inhaler 1 puff (1 puff Inhalation Not Given 11/20/22 2258)   ondansetron (ZOFRAN) injection 4 mg (has no administration in time range)   senna-docusate sodium (SENOKOT S) 8 6-50 mg per tablet 1 tablet (1 tablet Oral Not Given 11/20/22 2259)   insulin lispro (HumaLOG) 100 units/mL subcutaneous injection 1-5 Units (5 Units Subcutaneous Given 11/20/22 1830)   isosorbide mononitrate (IMDUR) 24 hr tablet 30 mg (30 mg Oral Given 11/20/22 1818)   cefepime (MAXIPIME) 2 g/50 mL dextrose IVPB (2,000 mg Intravenous New Bag 11/20/22 2303)   apixaban (ELIQUIS) tablet 5 mg (has no administration in time range)   insulin glargine (LANTUS) subcutaneous injection 40 Units 0 4 mL (40 Units Subcutaneous Given 11/20/22 2304)   vancomycin (VANCOCIN) IVPB (premix in dextrose) 1,000 mg 200 mL (has no administration in time range)   levalbuterol (XOPENEX) inhalation solution 1 25 mg (1 25 mg Nebulization Not Given 11/20/22 2215)   ipratropium (ATROVENT) 0 02 % inhalation solution 0 5 mg (0 5 mg Nebulization Not Given 11/20/22 2214)   furosemide (LASIX) injection 20 mg (has no administration in time range)   OLANZapine (ZyPREXA ZYDIS) dispersible tablet 5 mg (has no administration in time range)   magnesium sulfate (FOR EMS ONLY) 2 g/50 mL IVPB (premix) 2 g (0 g Does not apply Given to EMS 11/20/22 1101)   terbutaline (FOR EMS ONLY) (BRETHINE) injection 1 mg (0 mg Does not apply Given to EMS 11/20/22 1101)   midazolam (FOR EMS ONLY) (VERSED) 2 mg/2 mL injection 2 mg (0 mg Does not apply Given to EMS 11/20/22 1101)   furosemide (LASIX) injection 40 mg (40 mg Intravenous Given 11/20/22 1104)   cefepime (MAXIPIME) 2 g/50 mL dextrose IVPB (0 mg Intravenous Stopped 11/20/22 1330)   vancomycin (VANCOCIN) 2,000 mg in sodium chloride 0 9 % 500 mL IVPB (0 mg Intravenous Stopped 11/20/22 1612)   insulin regular (HumuLIN R,NovoLIN R) injection 10 Units (10 Units Subcutaneous Given 11/20/22 1832)   multi-electrolyte (ISOLYTE-S PH 7 4) bolus 100 mL (0 mL Intravenous Stopped 11/21/22 0032)       Diagnostic Studies  Results Reviewed     Procedure Component Value Units Date/Time    Phosphorus [[id number]]  (Abnormal) Collected: 11/20/22 2241    Lab Status: Final result Specimen: Blood from Arm, Left Updated: 11/20/22 2339     Phosphorus 4 4 mg/dL     Blood gas, venous [[id number]]  (Abnormal) Collected: 11/20/22 2329    Lab Status: Final result Specimen: Blood from Arm, Right Updated: 11/20/22 2339     pH, Dwain 7 288     pCO2, Dwain 67 2 mm Hg      pO2, Dwain 54 0 mm Hg      HCO3, Dwain 31 4 mmol/L      Base Excess, Dwain 4 0 mmol/L      O2 Content, Dwain 9 5 ml/dL      O2 HGB, VENOUS 83 3 %     Fingerstick Glucose (POCT) [[id number]]  (Abnormal) Collected: 11/20/22 1812    Lab Status: Final result Updated: 11/20/22 1813     POC Glucose 466 mg/dl     Blood gas, arterial [[id number]]  (Abnormal) Collected: 11/20/22 1738    Lab Status: Final result Specimen: Blood, Arterial from Radial, Left Updated: 11/20/22 1802     pH, Arterial 7 310     pCO2, Arterial 67 8 mm Hg      pO2, Arterial 94 1 mm Hg      HCO3, Arterial 33 4 mmol/L      Base Excess, Arterial 5 8 mmol/L      O2 Content, Arterial 12 6 mL/dL      O2 HGB,Arterial  95 5 %      SOURCE Radial, Left     KRISTIAN TEST Yes     Non Vent Type- HFNC HFNC Flow     NV HFNC FIO2 40     HFNC FLOW LPM 55    Blood culture [[id number]] Collected: 11/20/22 1245    Lab Status: Preliminary result Specimen: Blood from Arm, Right Updated: 11/20/22 1701     Blood Culture Received in Microbiology Lab  Culture in Progress      Blood culture [[id number]] Collected: 11/20/22 1220    Lab Status: Preliminary result Specimen: Blood from Arm, Right Updated: 11/20/22 1701     Blood Culture Received in Microbiology Lab  Culture in Progress  HS Troponin I 4hr [[id number]]  (Normal) Collected: 11/20/22 1606    Lab Status: Final result Specimen: Blood from Arm, Right Updated: 11/20/22 1651     hs TnI 4hr 36 ng/L      Delta 4hr hsTnI 11 ng/L     Blood gas, venous [[id number]]  (Abnormal) Collected: 11/20/22 1606    Lab Status: Final result Specimen: Blood from Arm, Right Updated: 11/20/22 1616     pH, Dwain 7 243     pCO2, Dwain 81 0 mm Hg      pO2, Dwain 31 5 mm Hg      HCO3, Dwain 34 2 mmol/L      Base Excess, Dwain 5 3 mmol/L      O2 Content, Dwain 6 9 ml/dL      O2 HGB, VENOUS 52 9 %     Fingerstick Glucose (POCT) [[id number]]  (Abnormal) Collected: 11/20/22 1412    Lab Status: Final result Updated: 11/20/22 1413     POC Glucose 377 mg/dl     HS Troponin I 2hr [[id number]]  (Normal) Collected: 11/20/22 1302    Lab Status: Final result Specimen: Blood from Arm, Right Updated: 11/20/22 1356     hs TnI 2hr 30 ng/L      Delta 2hr hsTnI 5 ng/L     Urine Microscopic [[id number]]  (Abnormal) Collected: 11/20/22 1322    Lab Status: Final result Specimen: Urine, Indwelling Cook Catheter Updated: 11/20/22 1354     RBC, UA 20-30 /hpf      WBC, UA Innumerable /hpf      Epithelial Cells None Seen /hpf      Bacteria, UA Occasional /hpf      MUCUS THREADS Occasional     WBC Clumps Present    Urine culture [[id number]] Collected: 11/20/22 1322    Lab Status:  In process Specimen: Urine, Indwelling Cook Catheter Updated: 11/20/22 1354    UA w Reflex to Microscopic w Reflex to Culture [[id number]]  (Abnormal) Collected: 11/20/22 1322    Lab Status: Final result Specimen: Urine, Indwelling Cook Catheter Updated: 11/20/22 1343     Color, UA Light Orange     Clarity, UA Extra Turbid     Specific Gravity, UA 1 009     pH, UA 5 0     Leukocytes, UA Large     Nitrite, UA Negative     Protein, UA 70 (1+) mg/dl      Glucose,  (1/10%) mg/dl      Ketones, UA Negative mg/dl      Urobilinogen, UA <2 0 mg/dl      Bilirubin, UA Negative     Occult Blood, UA Large    APTT [[id number]]  (Normal) Collected: 11/20/22 1235    Lab Status: Final result Specimen: Blood Updated: 11/20/22 1325     PTT 26 seconds     Protime-INR [[id number]]  (Normal) Collected: 11/20/22 1235    Lab Status: Final result Specimen: Blood Updated: 11/20/22 1325     Protime 14 2 seconds      INR 1 08    Lactic acid, plasma [[id number]]  (Normal) Collected: 11/20/22 1235    Lab Status: Final result Specimen: Blood from Arm, Left Updated: 11/20/22 1319     LACTIC ACID 2 0 mmol/L     Narrative:      Result may be elevated if tourniquet was used during collection  Procalcitonin [[id number]]  (Abnormal) Collected: 11/20/22 1102    Lab Status: Final result Specimen: Blood from Arm, Right Updated: 11/20/22 1318     Procalcitonin 0 26 ng/ml     MRSA culture [[id number]] Collected: 11/20/22 1256    Lab Status: In process Specimen: Nares from Nose Updated: 11/20/22 1307    FLU/RSV/COVID - if FLU/RSV clinically relevant [[id number]]  (Normal) Collected: 11/20/22 1102    Lab Status: Final result Specimen: Nares from Nose Updated: 11/20/22 1152     SARS-CoV-2 Negative     INFLUENZA A PCR Negative     INFLUENZA B PCR Negative     RSV PCR Negative    Narrative:      FOR PEDIATRIC PATIENTS - copy/paste COVID Guidelines URL to browser: [email or web address]    SARS-CoV-2 assay is a Nucleic Acid Amplification assay intended for the  qualitative detection of nucleic acid from SARS-CoV-2 in nasopharyngeal  swabs  Results are for the presumptive identification of SARS-CoV-2 RNA  Positive results are indicative of infection with SARS-CoV-2, the virus  causing COVID-19, but do not rule out bacterial infection or co-infection  with other viruses  Laboratories within the [place] and its  territories are required to report all positive results to the appropriate  public health authorities   Negative results do not preclude SARS-CoV-2  infection and should not be used as the sole basis for treatment or other  patient management decisions  Negative results must be combined with  clinical observations, patient history, and epidemiological information  This test has not been FDA cleared or approved  This test has been authorized by FDA under an Emergency Use Authorization  (EUA)  This test is only authorized for the duration of time the  declaration that circumstances exist justifying the authorization of the  emergency use of an in vitro diagnostic tests for detection of SARS-CoV-2  virus and/or diagnosis of COVID-19 infection under section 564(b)(1) of  the Act, 21 U  S C  615EJH-2(F)(7), unless the authorization is terminated  or revoked sooner  The test has been validated but independent review by FDA  and CLIA is pending  Test performed using [employer] GeneXpert: This RT-PCR assay targets N2,  a region unique to SARS-CoV-2  A conserved region in the E-gene was chosen  for pan-Sarbecovirus detection which includes SARS-CoV-2  According to CMS-2020-01-R, this platform meets the definition of high-throughput technology      HS Troponin 0hr (reflex protocol) [[id number]]  (Normal) Collected: 11/20/22 1102    Lab Status: Final result Specimen: Blood from Arm, Right Updated: 11/20/22 1144     hs TnI 0hr 25 ng/L     CBC and differential [[id number]]  (Abnormal) Collected: 11/20/22 1102    Lab Status: Final result Specimen: Blood from Arm, Right Updated: 11/20/22 1141     WBC 17 88 Thousand/uL      RBC 3 40 Million/uL      Hemoglobin 8 7 g/dL      Hematocrit 29 6 %      MCV 87 fL      MCH 25 6 pg      MCHC 29 4 g/dL      RDW 15 1 %      MPV 10 4 fL      Platelets 876 Thousands/uL      nRBC 0 /100 WBCs      Neutrophils Relative 90 %      Immat GRANS % 0 %      Lymphocytes Relative 2 %      Monocytes Relative 8 %      Eosinophils Relative 0 %      Basophils Relative 0 %      Neutrophils Absolute 15 98 Thousands/µL Immature Grans Absolute 0 08 Thousand/uL      Lymphocytes Absolute 0 43 Thousands/µL      Monocytes Absolute 1 37 Thousand/µL      Eosinophils Absolute 0 00 Thousand/µL      Basophils Absolute 0 02 Thousands/µL     Narrative: This is an appended report  These results have been appended to a previously verified report      NT-BNP PRO [[id number]]  (Abnormal) Collected: 11/20/22 1102    Lab Status: Final result Specimen: Blood from Arm, Right Updated: 11/20/22 1139     NT-proBNP 4,432 pg/mL     Comprehensive metabolic panel [[id number]]  (Abnormal) Collected: 11/20/22 1102    Lab Status: Final result Specimen: Blood from Arm, Right Updated: 11/20/22 1138     Sodium 137 mmol/L      Potassium 4 1 mmol/L      Chloride 102 mmol/L      CO2 29 mmol/L      ANION GAP 6 mmol/L      BUN 31 mg/dL      Creatinine 2 01 mg/dL      Glucose 375 mg/dL      Calcium 8 4 mg/dL      Corrected Calcium 9 4 mg/dL      AST 7 U/L      ALT 12 U/L      Alkaline Phosphatase 84 U/L      Total Protein 7 3 g/dL      Albumin 2 7 g/dL      Total Bilirubin 0 47 mg/dL      eGFR 31 ml/min/1 73sq m     Narrative:      [facility] guidelines for Chronic Kidney Disease (CKD):   •  Stage 1 with normal or high GFR (GFR > 90 mL/min/1 73 square meters)  •  Stage 2 Mild CKD (GFR = 60-89 mL/min/1 73 square meters)  •  Stage 3A Moderate CKD (GFR = 45-59 mL/min/1 73 square meters)  •  Stage 3B Moderate CKD (GFR = 30-44 mL/min/1 73 square meters)  •  Stage 4 Severe CKD (GFR = 15-29 mL/min/1 73 square meters)  •  Stage 5 End Stage CKD (GFR <15 mL/min/1 73 square meters)  Note: GFR calculation is accurate only with a steady state creatinine    Blood gas, venous [[id number]]  (Abnormal) Collected: 11/20/22 1102    Lab Status: Final result Specimen: Blood from Arm, Right Updated: 11/20/22 1110     pH, Dwain 7 252     pCO2, Dwain 67 4 mm Hg      pO2, Dwain 57 7 mm Hg      HCO3, Dwain 29 0 mmol/L      Base Excess, Dwain 0 8 mmol/L      O2 Content, Dwain 12 0 ml/dL      O2 HGB, VENOUS 84 0 %                  CT chest wo contrast   Final Result by Meliza Reed MD (11/20 1758)      1  Increase in moderate right pleural effusion with new small left effusion  2   New near complete occlusion of middle lobe and right lower lobe bronchial branches with atelectasis  3  New small perihepatic ascites  The study was marked in [facility] for immediate notification  Workstation performed: [id number]         XR chest 1 view portable   Final Result by Kalpana May MD (11/20 2056)      Increased size right pleural effusion    Workstation performed: [id number]               Procedures  Procedures      ED Course                          Initial Sepsis Screening     [address] Name 11/20/22 1226                Is the patient's history suggestive of a new or worsening infection? Yes (Proceed)  -SK        Suspected source of infection pneumonia  -SK        Are two or more of the following signs & symptoms of infection both present and new to the patient? Yes (Proceed)  -SK        Indicate SIRS criteria Tachycardia > 90 bpm;Tachypnea > 20 resp per min;Leukocytosis (WBC > [address] IJL)  -SK        If the answer is yes to both questions, suspicion of sepsis is present --        If severe sepsis is present AND tissue hypoperfusion perists in the hour after fluid resuscitation or lactate > 4, the patient meets criteria for SEPTIC SHOCK --        Are any of the following organ dysfunction criteria present within 6 hours of suspected infection and SIRS criteria that are NOT considered to be chronic conditions?  --        Organ dysfunction --        Date of presentation of severe sepsis --        Time of presentation of severe sepsis --        Tissue hypoperfusion persists in the hour after crystalloid fluid administration, evidenced, by either: --        Was hypotension present within one hour of the conclusion of crystalloid fluid administration? --        Date of presentation of septic shock --        Time of presentation of septic shock --              User Key  (r) = Recorded By, (t) = Taken By, (c) = Cosigned By    [address] Name Provider Seth Ragsdale MD Resident                SBIRT 20yo+    Flowsheet Row Most Recent Value   SBIRT (23 yo +)    In order to provide better care to our patients, we are screening all of our patients for alcohol and drug use  Would it be okay to ask you these screening questions? Yes Filed at: 11/20/2022 1051   Initial Alcohol Screen: US AUDIT-C     1  How often do you have a drink containing alcohol? 0 Filed at: 11/20/2022 1051   2  How many drinks containing alcohol do you have on a typical day you are drinking? 0 Filed at: 11/20/2022 1051   3a  Male UNDER 65: How often do you have five or more drinks on one occasion? 0 Filed at: 11/20/2022 1051   3b  FEMALE Any Age, or MALE 65+: How often do you have 4 or more drinks on one occassion? 0 Filed at: 11/20/2022 1051   Audit-C Score 0 Filed at: 11/20/2022 1051   BERTHA: How many times in the past year have you    Used an illegal drug or used a prescription medication for non-medical reasons?  Never Filed at: 11/20/2022 1051                MDM  Number of Diagnoses or Management Options  Acute exacerbation of CHF (congestive heart failure) (HCC)  Pneumonia  Diagnosis management comments:    78year old male with shortness of breath   Exam finding consistent with fluid overload  BIPAP ordered however patient refused   HFNC started   Nitro gtt started   Patient symptom improved   Titrated oxygen requirement down to 6L of NC   VBG with pCO2 elevated but most likely chronic elevation with near normal pH   BNP elevated  Patient with significant leukocytosis and procal elvated   Concerning for superimposed pna   Infectious workup ordered   Due to recent admission will start broad spectrum abx   Patient admitted to medicine due to CHF exacerbation as well as possible pna Disposition  Final diagnoses:   Acute exacerbation of CHF (congestive heart failure) ([facility] )   Pneumonia     Time reflects when diagnosis was documented in both MDM as applicable and the Disposition within this note     Time User Action Codes Description Comment    11/20/2022  1:36 PM Mark Chirinos Add [I50 9] Acute exacerbation of CHF (congestive heart failure) ([facility] )     11/20/2022  1:36 PM Mark Chirinos Add [J18 9] Pneumonia     11/20/2022  2:21 PM Yu Me Add [E11 65,  Z79 4] Type 2 diabetes mellitus with hyperglycemia, with long-term current use of insulin [facility])       ED Disposition     ED Disposition   Admit    Condition   Stable    Date/Time   Sun Nov 20, 2022  1:36 PM    Comment   Case was discussed with Dr Sandy Donohue and the patient's admission status was agreed to be Admission Status: inpatient status to the service of Dr Sandy Donohue              Follow-up Information    None         Current Discharge Medication List      CONTINUE these medications which have NOT CHANGED    Details   apixaban (ELIQUIS) 5 mg Take 1 tablet (5 mg total) by mouth 2 (two) times a day  Qty: 60 tablet, Refills: 0    Associated Diagnoses: A-fib (HCC)      aspirin (ECOTRIN LOW STRENGTH) 81 mg EC tablet Take 1 tablet (81 mg total) by mouth daily  Qty: 30 tablet, Refills: 0    Associated Diagnoses: Essential hypertension; Coronary artery disease involving native coronary artery of native heart without angina pectoris      carvedilol (COREG) 6 25 mg tablet Take 1 tablet (6 25 mg total) by mouth 2 (two) times a day with meals  Qty: 60 tablet, Refills: 0    Associated Diagnoses: Essential hypertension; Coronary artery disease involving native coronary artery of native heart without angina pectoris      cyclobenzaprine (FLEXERIL) 10 mg tablet Take 1 tablet (10 mg total) by mouth 2 (two) times a day as needed for muscle spasms  Qty: 21 tablet, Refills: 0    Associated Diagnoses: Muscle spasm      ferrous sulfate 325 (65 Fe) mg tablet Take 1 tablet (325 mg total) by mouth daily with breakfast  Qty: 30 tablet, Refills: 0    Associated Diagnoses: Stage 3a chronic kidney disease (formerly [facility])      fluticasone-vilanterol (BREO ELLIPTA) 100-25 mcg/inh inhaler Inhale 1 puff daily Rinse mouth after use    Qty: 60 blister, Refills: 0    Associated Diagnoses: COPD (chronic obstructive pulmonary disease) (formerly [facility])      furosemide (LASIX) 40 mg tablet Take 1 tablet (40 mg total) by mouth daily  Qty: 30 tablet, Refills: 0    Associated Diagnoses: Acute on chronic diastolic (congestive) heart failure (formerly [facility])      gabapentin (NEURONTIN) 100 mg capsule Take 1 capsule (100 mg total) by mouth 3 (three) times a day  Qty: 90 capsule, Refills: 0    Associated Diagnoses: Chronic bilateral low back pain without sciatica      Insulin Glargine Solostar (Lantus SoloStar) 100 UNIT/ML SOPN Inject 0 3 mL (30 Units total) under the skin daily at bedtime  Qty: 12 mL, Refills: 0    Associated Diagnoses: Type 2 diabetes mellitus with hyperglycemia, with long-term current use of insulin (formerly [facility])      insulin lispro (HumaLOG KwikPen) 100 units/mL injection pen Inject 10 Units under the skin 3 (three) times a day with meals  Qty: 15 mL, Refills: 0    Associated Diagnoses: Type 2 diabetes mellitus with hyperglycemia, with long-term current use of insulin (formerly [facility])      isosorbide mononitrate (IMDUR) 30 mg 24 hr tablet Take 1 tablet (30 mg total) by mouth daily  Qty: 30 tablet, Refills: 0    Associated Diagnoses: Essential hypertension; Coronary artery disease involving native coronary artery of native heart without angina pectoris      pantoprazole (PROTONIX) 40 mg tablet Take 1 tablet (40 mg total) by mouth 2 (two) times a day before meals  Qty: 60 tablet, Refills: 0    Associated Diagnoses: ABLA (acute blood loss anemia)      simvastatin (ZOCOR) 40 mg tablet Take 1 tablet (40 mg total) by mouth daily  Qty: 30 tablet, Refills: 0    Associated Diagnoses: Hypercholesterolemia      tamsulosin (FLOMAX) 0 4 mg Take 1 capsule (0 4 mg total) by mouth daily with dinner  Qty: 30 capsule, Refills: 0    Associated Diagnoses: Acute cystitis without hematuria      umeclidinium bromide (INCRUSE ELLIPTA) 62 5 mcg/inh AEPB inhaler Inhale 1 puff daily Do not start before October 16, 2022  Qty: 30 blister, Refills: 0    Associated Diagnoses: COPD with acute exacerbation ([facility] )      Alcohol Swabs 70 % PADS May substitute brand based on insurance coverage  Check glucose TID  Qty: 100 each, Refills: 0    Associated Diagnoses: Type 2 diabetes mellitus with hyperglycemia, with long-term current use of insulin ([facility])      Blood Glucose Monitoring Suppl (OneTouch Verio Reflect) w/Device KIT May substitute brand based on insurance coverage  Check glucose TID  Qty: 1 kit, Refills: 0    Associated Diagnoses: Type 2 diabetes mellitus with hyperglycemia, with long-term current use of insulin ([facility] 75 )      ! ! glucose blood (OneTouch Verio) test strip May substitute brand based on insurance coverage  Check glucose TID  Qty: 100 each, Refills: 0    Associated Diagnoses: Type 2 diabetes mellitus with hyperglycemia, with long-term current use of insulin ([facility] 75 )      ! ! glucose blood test strip Use 1 each daily as needed (check sugars) Use as instructed  Uses One Touch Ultra test strip  Qty: 50 strip, Refills: 2    Comments: Needs to be delivered  Has no transportation  Associated Diagnoses: Type 2 diabetes mellitus with hypoglycemia without coma, with long-term current use of insulin ([facility] )      ! ! Insulin Pen Needle (BD Pen Needle Beena 2nd Gen) 32G X 4 MM MISC For use with insulin pen  Pharmacy may dispense brand covered by insurance  Qty: 100 each, Refills: 0    Associated Diagnoses: Type 2 diabetes mellitus with hyperglycemia, with long-term current use of insulin ([facility] )      ! ! Insulin Pen Needle (BD Pen Needle Beena 2nd Gen) 32G X 4 MM MISC For use with insulin pen  Pharmacy may dispense brand covered by insurance    Qty: 100 each, Refills: 0    Associated Diagnoses: Type 2 diabetes mellitus with hyperglycemia, with long-term current use of insulin ([address] )      OneTouch Delica Lancets 88Y MISC May substitute brand based on insurance coverage  Check glucose TID  Qty: 100 each, Refills: 0    Associated Diagnoses: Type 2 diabetes mellitus with hyperglycemia, with long-term current use of insulin ([address] )       ! ! - Potential duplicate medications found  Please discuss with provider  No discharge procedures on file  PDMP Review       Value Time User    PDMP Reviewed  Yes 11/8/2022 12:43 AM Dashawn Tompkins DO           ED Provider  Attending physically available and evaluated Emily Barnes Sr    I managed the patient along with the ED Attending      Electronically Signed by         Milagro Arrieta MD  11/21/22 3259

## 2022-11-20 NOTE — ASSESSMENT & PLAN NOTE
Lab Results   Component Value Date    HGBA1C 9 7 (H) 10/10/2022       Recent Labs     11/20/22  1412   POCGLU 377*       Blood Sugar Average: Last 72 hrs:  (P) 377     Home regimen includes 30 units at bedtime, 10 units with meals  Will continue 25 units Lantus at bedtime, 8 units with meals, still continue sliding scale

## 2022-11-20 NOTE — ED NOTES
Pt refusing C-pap upon arrival MD at bedside     Sheba Whittaker, [address] [place] [address]  11/20/22 0805

## 2022-11-20 NOTE — ASSESSMENT & PLAN NOTE
Acute on chronic respiratory failure  Patient admitted for acute heart failure exacerbation initial requiring 50 L high-flow now weaned down to 6 L nasal cannula  Continue to wean oxygen as able

## 2022-11-20 NOTE — ED NOTES
Patient refusing BiPAP at this time  Both respiratory and MD Art ) at bedside  Pt is accepting JOSE A Paniagua RN  11/20/22 0625

## 2022-11-20 NOTE — ASSESSMENT & PLAN NOTE
Chest x-ray with some concern for pneumonia, also with leukocytosis, slight elevation in pro count  Will obtain CT chest  Continue antibiotics for now  Trend procalcitonin, CBC, monitor fever curve

## 2022-11-20 NOTE — ED NOTES
EMS reports that son told them that he and his father are being evicted from their home due to hoarding and that pt will need case management prior to discharge because he will most likely need long-term care placement        Linda Mullen RN  11/20/22 6292

## 2022-11-20 NOTE — ED NOTES
Pt VBG cam back Dr Sedrick Deleon made aware of results  Pt seems to be struggling more to breath but pt does not want to go back on high flow oxygen    Pt still on [facility], RN  11/20/22 6288

## 2022-11-20 NOTE — ED NOTES
Per MD, patient to be trialed off of high flow NC  Patient placed on 6L NC at this time  Respiratory notified        Stephen Garcias RN  11/20/22 0039

## 2022-11-20 NOTE — H&P
[address] [facility]  H&P- Antonia Brantley   1945, 78 y o  male MRN: [id number]  Unit/Bed#: ED 11 Encounter: [id number]  Primary Care Provider: Ceferino Marsh MD   Date and time admitted to hospital: 11/20/2022 10:40 AM    Pneumonia  Assessment & Plan  Chest x-ray with some concern for pneumonia, also with leukocytosis, slight elevation in pro count  Will obtain CT chest  Continue antibiotics for now  Trend procalcitonin, CBC, monitor fever curve    Sepsis ([facility] )  Assessment & Plan  Sepsis as evidenced by leukocytosis, tachypnea, secondary to suspected pneumonia  CT chest pending  Continue IV antibiotics    A-fib ([facility] )  Assessment & Plan  Continue Eliquis and carvedilol    Stage 3b chronic kidney disease (CKD) ([place] [address] )  Assessment & Plan  Lab Results   Component Value Date    EGFR 31 11/20/2022    EGFR 31 11/09/2022    EGFR 39 11/08/2022    CREATININE 2 01 (H) 11/20/2022    CREATININE 2 00 (H) 11/09/2022    CREATININE 1 68 (H) 11/08/2022   Creatinine baseline seems to be around 1 8-2  Creatinine on admission 2 01, currently within baseline  Will continue on IV Lasix 40 mg b i d   For heart failure exacerbation    Adenocarcinoma of lung [facility])  Assessment & Plan  Patient with history of adeno carcinoma status post resection and radiation therapy  Last CT chest in May of this year    Acute on chronic respiratory failure with hypoxia and hypercapnia (HCC)  Assessment & Plan  Acute on chronic respiratory failure  Patient admitted for acute heart failure exacerbation initial requiring 50 L high-flow now weaned down to 6 L nasal cannula  Continue to wean oxygen as able    CAD (coronary artery disease)  Assessment & Plan  Patient with history of CAD with stenting in 2015  Stress test in May of this year negative  Will continue aspirin, statin, beta-blocker    Essential hypertension  Assessment & Plan  Continue carvedilol and Lasix    Type 2 diabetes mellitus with hyperglycemia, with long-term current use of insulin (HCC)  Assessment & Plan  Lab Results   Component Value Date    HGBA1C 9 7 (H) 10/10/2022       Recent Labs     11/20/22  1412   POCGLU 377*       Blood Sugar Average: Last 72 hrs:  (P) 377     Home regimen includes 30 units at bedtime, 10 units with meals  Will continue 25 units Lantus at bedtime, 8 units with meals, still continue sliding scale    HLD (hyperlipidemia)  Assessment & Plan  Continue pravastatin 80 mg daily    COPD (chronic obstructive pulmonary disease) ([facility] )  Assessment & Plan  Patient with history of COPD on 2 L nasal cannula baseline  Do not believe patient has a COPD exacerbation at this time  Currently on 6 L nasal cannula will wean oxygen as able    * Acute on chronic diastolic heart failure (HCC)  Assessment & Plan  Wt Readings from Last 3 Encounters:   11/20/22 109 kg (240 lb)   11/09/22 103 kg (227 lb 3 2 oz)   11/01/22 97 4 kg (214 lb 11 7 oz)       Patient with history of chronic diastolic heart failure  Presenting with shortness of breath worsening leg swelling for the past day  Patient examined volume overloaded, crackles on exam, lower extremity edema  Will continue on IV Lasix 40 mg b i d  Home dose is 40 mg Lasix daily  Fluid restriction, sodium restriction  Monitor urine output, check daily weight            VTE Pharmacologic Prophylaxis:   Moderate Risk (Score 3-4) - Pharmacological DVT Prophylaxis Ordered: apixaban (Eliquis)  Code Status: Level 1 - Full Code   Discussion with family: Attempted to update  (son) via phone  Left voicemail  Anticipated Length of Stay: Patient will be admitted on an inpatient basis with an anticipated length of stay of greater than 2 midnights secondary to Heart failure  Total Time for Visit, including Counseling / Coordination of Care: 30 minutes Greater than 50% of this total time spent on direct patient counseling and coordination of care      Chief Complaint:  Shortness of breath    History of Present Illness:  Zion Weinberg Sr  is a 78 y o  male with a PMH  of COPD on 2 L nasal cannula at baseline, chronic diastolic heart failure, iron deficiency anemia, CKD stage IIIB-creatinine baseline around 2, AFib on Eliquis, type 2 diabetes on insulin, adeno carcinoma status post SBRT, presented to the ER for evaluation of shortness of breath  Patient reports acute onset shortness of breath yesterday worsening over the past 24 hours  Patient denies any fever chills chest pain nausea vomiting abdominal pain  He did report he has noticed worsening leg swelling  He also reports worsening cough from baseline with productive sputum  On initial evaluation in ER patient was noted to be in significant respiratory distress with increased work of breathing ,  trial of BiPAP was recommended  However patient refused  He was placed on high-flow nasal cannula at 50 L and gradually wean down to 6 L nasal cannula on my evaluation  Patient with comfortable, did not display any conversational dyspnea or significant increased work of breathing  Patient appears volume overloaded on exam with lower extremity swelling, crackles at bases  Patient will be admitted for management of acute chronic heart failure as well as possible pneumonia  Of note, per EMS there is concern that the patient is hoarding  Appears patient is being evicted secondary to hoarding  Will need case management for long-term placement  Review of Systems:  Review of Systems   Constitutional: Positive for fatigue  Negative for chills and fever  HENT: Negative for ear pain and sore throat  Eyes: Negative for pain and visual disturbance  Respiratory: Positive for cough and shortness of breath  Cardiovascular: Negative for chest pain and palpitations  Gastrointestinal: Negative for abdominal distention, abdominal pain, diarrhea, nausea and vomiting  Endocrine: Negative for polydipsia, polyphagia and polyuria     Genitourinary: Negative for dysuria and hematuria  Musculoskeletal: Negative for arthralgias and back pain  Skin: Negative for color change, pallor and rash  Neurological: Negative for seizures and syncope  All other systems reviewed and are negative  Past Medical and Surgical History:   Past Medical History:   Diagnosis Date   • Abdominal pain    • [facility]) [facility] ) 6/19/2018   • Cardiac disease    • CHF (congestive heart failure) (HCC)    • COPD, severe (HCC)    • Coronary artery disease    • DDD (degenerative disc disease), lumbar 9/1/2020   • Diabetes mellitus (HCC)    • Dyspnea    • GERD (gastroesophageal reflux disease)    • Hyperlipidemia    • Hypertension    • MI (myocardial infarction) [facility] )     with 3 stents   • Nodule of apex of right lung    • JACQUELINE (obstructive sleep apnea)    • Prostate cancer [facility])        Past Surgical History:   Procedure Laterality Date   • ABDOMINAL SURGERY      exploratory   • ANGIOPLASTY      3 stents   • APPENDECTOMY     • COLONOSCOPY  2015   • CT NEEDLE BIOPSY LUNG  11/3/2020   • ESOPHAGOGASTRODUODENOSCOPY N/A 10/2/2017    Procedure: ESOPHAGOGASTRODUODENOSCOPY (EGD); Surgeon: Augusto Dawkins MD;  Location: [facility]; Service: Gastroenterology   • IR THORACENTESIS  11/3/2020   • IR THORACENTESIS  11/8/2022   • KNEE CARTILAGE SURGERY     • OTHER SURGICAL HISTORY      stent placement   • PROSTATE SURGERY     • SKIN GRAFT      Basal cell CA back       Meds/Allergies:  Prior to Admission medications    Medication Sig Start Date End Date Taking?  Authorizing Provider   apixaban (ELIQUIS) 5 mg Take 1 tablet (5 mg total) by mouth 2 (two) times a day 10/11/22  Yes YOLANDE Fiore   aspirin (ECOTRIN LOW STRENGTH) 81 mg EC tablet Take 1 tablet (81 mg total) by mouth daily 10/24/22 11/23/22 Yes Shannon Schmitt MD   carvedilol (COREG) 6 25 mg tablet Take 1 tablet (6 25 mg total) by mouth 2 (two) times a day with meals 10/24/22 11/23/22 Yes Shannon Schmitt MD   cyclobenzaprine (FLEXERIL) 10 mg tablet Take 1 tablet (10 mg total) by mouth 2 (two) times a day as needed for muscle spasms 9/1/22  Yes Kamila Hastings DO   ferrous sulfate 325 (65 Fe) mg tablet Take 1 tablet (325 mg total) by mouth daily with breakfast 10/24/22 11/23/22 Yes Lynn Booker MD   fluticasone-vilanterol (BREO ELLIPTA) 100-25 mcg/inh inhaler Inhale 1 puff daily Rinse mouth after use  10/24/22  Yes Lynn Booker MD   furosemide (LASIX) 40 mg tablet Take 1 tablet (40 mg total) by mouth daily 10/24/22 11/23/22 Yes Lynn Booker MD   gabapentin (NEURONTIN) 100 mg capsule Take 1 capsule (100 mg total) by mouth 3 (three) times a day 9/1/22  Yes Kamila Hastings DO   Insulin Glargine Solostar (Lantus SoloStar) 100 UNIT/ML SOPN Inject 0 3 mL (30 Units total) under the skin daily at bedtime 11/2/22 12/2/22 Yes YOLANDE Fiore   insulin lispro (HumaLOG KwikPen) 100 units/mL injection pen Inject 10 Units under the skin 3 (three) times a day with meals 11/2/22  Yes YOLANDE Fiore   isosorbide mononitrate (IMDUR) 30 mg 24 hr tablet Take 1 tablet (30 mg total) by mouth daily 10/24/22  Yes Lynn Booker MD   pantoprazole (PROTONIX) 40 mg tablet Take 1 tablet (40 mg total) by mouth 2 (two) times a day before meals 10/24/22 11/23/22 Yes Lynn Booker MD   simvastatin (ZOCOR) 40 mg tablet Take 1 tablet (40 mg total) by mouth daily 10/24/22 11/23/22 Yes Lynn Booker MD   tamsulosin [facility]) 0 4 mg Take 1 capsule (0 4 mg total) by mouth daily with dinner 10/24/22 11/23/22 Yes Lynn Booker MD   umeclidinium bromide (INCRUSE ELLIPTA) 62 5 mcg/inh AEPB inhaler Inhale 1 puff daily Do not start before October 16, 2022  10/16/22  Yes Deyvi Rosario, DO   Alcohol Swabs 70 % PADS May substitute brand based on insurance coverage  Check glucose TID  10/24/22   Lynn Booker MD   Blood Glucose Monitoring Suppl (OneTouch Verio Reflect) w/Device KIT May substitute brand based on insurance coverage   Check glucose TID  10/24/22   Lisa Blackburn MD Juli   glucose blood (OneTouch Verio) test strip May substitute brand based on insurance coverage  Check glucose TID  10/24/22   Rocio Lara MD   glucose blood test strip Use 1 each daily as needed (check sugars) Use as instructed  Uses One Touch Ultra test strip 8/23/22   Leticia Amato MD   Insulin Pen Needle (BD Pen Needle Beena 2nd Gen) 32G X 4 MM MISC For use with insulin pen  Pharmacy may dispense brand covered by insurance  10/24/22   Rocio Lara MD   Insulin Pen Needle (BD Pen Needle Beena 2nd Gen) 32G X 4 MM MISC For use with insulin pen  Pharmacy may dispense brand covered by insurance  10/24/22   Rocio Lara MD   OneTouch Delica Lancets 16B MISC May substitute brand based on insurance coverage  Check glucose TID  10/24/22   Rocio Lara MD   cyanocobalamin 1,000 mcg/mL Inject 1 mL (1,000 mcg total) into a muscle every 30 (thirty) days 8/16/22 8/22/22  Radha Blackburn MD   insulin aspart protamine-insulin aspart (NovoLOG 70/30) 100 units/mL injection Inject 45 Units under the skin every 8 (eight) hours 8/16/22 8/16/22  Radha Blackburn MD   lisinopril (ZESTRIL) 10 mg tablet TAKE 1 TABLET BY MOUTH DAILY 8/17/22 8/22/22  Alayna Wong MD   potassium chloride (K-DUR,KLOR-CON) 20 mEq tablet Take 1 tablet (20 mEq total) by mouth daily 10/24/22 10/24/22  Rocio Lara MD     I have reviewed home medications with patient personally  Allergies: Allergies   Allergen Reactions   • Crestor [Rosuvastatin] Other (See Comments)     Unknown     • Lisinopril GI Intolerance, Dizziness and Abdominal Pain   • Metformin GI Intolerance       Social History:  Marital Status:     Occupation:   Patient Pre-hospital Living Situation: Home  Patient Pre-hospital Level of Mobility: walks  Patient Pre-hospital Diet Restrictions:   Substance Use History:   Social History     Substance and Sexual Activity   Alcohol Use Never     Social History     Tobacco Use   Smoking Status Former   • Packs/day: 1 50   • Years: 50 00   • Pack years: 75 00   • Types: Cigarettes   • Start date: 38   • Quit date: 2020   • Years since quittin 3   Smokeless Tobacco Never     Social History     Substance and Sexual Activity   Drug Use No       Family History:  Family History   Problem Relation Age of Onset   • Heart disease Father    • Other Father         Mesothelioma        Physical Exam:     Vitals:   Blood Pressure: 141/84 (22 1500)  Pulse: 100 (22 1500)  Temperature: 98 1 °F (36 7 °C) (22 1048)  Temp Source: Tympanic (22 1048)  Respirations: 22 (22 1500)  Weight - Scale: 109 kg (240 lb) (22 1048)  SpO2: 93 % (22 1500)    Physical Exam  Constitutional:       General: He is not in acute distress  Appearance: He is ill-appearing  He is not toxic-appearing or diaphoretic  Comments: Chronically ill-appearing, disheveled   Eyes:      General: No scleral icterus  Cardiovascular:      Rate and Rhythm: Rhythm irregular  Heart sounds: No friction rub  No gallop  Pulmonary:      Breath sounds: No stridor  Rales present  No wheezing or rhonchi  Chest:      Chest wall: No tenderness  Abdominal:      General: There is no distension  Palpations: There is no mass  Tenderness: There is no abdominal tenderness  There is no guarding or rebound  Musculoskeletal:      Right lower leg: Edema present  Left lower leg: Edema present  Neurological:      Mental Status: He is oriented to person, place, and time            Additional Data:     Lab Results:  Results from last 7 days   Lab Units 22  1102   WBC Thousand/uL 17 88*   HEMOGLOBIN g/dL 8 7*   HEMATOCRIT % 29 6*   PLATELETS Thousands/uL 183   NEUTROS PCT % 90*   LYMPHS PCT % 2*   MONOS PCT % 8   EOS PCT % 0     Results from last 7 days   Lab Units 22  1102   SODIUM mmol/L 137   POTASSIUM mmol/L 4 1   CHLORIDE mmol/L 102   CO2 mmol/L 29   BUN mg/dL 31*   CREATININE mg/dL 2 01*   ANION GAP mmol/L 6 CALCIUM mg/dL 8 4   ALBUMIN g/dL 2 7*   TOTAL BILIRUBIN mg/dL 0 47   ALK PHOS U/L 84   ALT U/L 12   AST U/L 7   GLUCOSE RANDOM mg/dL 375*     Results from last 7 days   Lab Units 22  1235   INR  1 08     Results from last 7 days   Lab Units 22  1412   POC GLUCOSE mg/dl 377*         Results from last 7 days   Lab Units 22  1235 22  1102   LACTIC ACID mmol/L 2 0  --    PROCALCITONIN ng/ml  --  0 26*       Lines/Drains:  Invasive Devices     Peripheral Intravenous Line  Duration           Peripheral IV 22 Dorsal (posterior); Left Forearm <1 day    Peripheral IV 22 Right Antecubital <1 day          Drain  Duration           Urethral Catheter Straight-tip 18 Fr  <1 day              Urinary Catheter:  Goal for removal: Remove after 48 hrs of I/O monitoring             Imaging: Reviewed radiology reports from this admission including: chest xray and chest CT scan  XR chest 1 view portable    (Results Pending)   CT chest wo contrast    (Results Pending)       EKG and Other Studies Reviewed on Admission:   · EKst degree av block  ** Please Note: This note has been constructed using a voice recognition system   **

## 2022-11-20 NOTE — ASSESSMENT & PLAN NOTE
Patient with history of adeno carcinoma status post resection and radiation therapy  Last CT chest in May of this year

## 2022-11-20 NOTE — ASSESSMENT & PLAN NOTE
Patient with history of CAD with stenting in 2015  Stress test in May of this year negative  Will continue aspirin, statin, beta-blocker

## 2022-11-20 NOTE — ED NOTES
Pt had disposable brief on with was falling apart due to being so saturated  Pt incontinent of stool and urine    Pt changed and new linens placed on bed     Zulma Smith RN  11/20/22 [id number]

## 2022-11-20 NOTE — ED NOTES
Respiratory called to place pt back on high flow oxygen  Pt is agreeing to try it again       Rafael Smallwood RN  11/20/22 4807

## 2022-11-20 NOTE — RESPIRATORY THERAPY NOTE
11/20/22 1053   Respiratory Assessment   Assessment Type Assess only   General Appearance Lethargic   Respiratory Pattern Dyspnea with exertion   Chest Assessment Chest expansion symmetrical   Bilateral Breath Sounds Diminished   Resp Comments Attempted to place pt on BIPAP, pt refused multpiple times  MD made aware  MD ordered HFNC instead  Patient is on 50/50   Bilateral BS diminshed   O2 Device HFNC   Oxygen Therapy/Pulse Ox   O2 Device High flow nasal cannula   FiO2 (%) 50   O2 Flow Rate (L/min) 50 L/min   SpO2 97 %   SpO2 Activity At Rest   $ Pulse Oximetry Spot Check Charge Completed

## 2022-11-21 ENCOUNTER — APPOINTMENT (INPATIENT)
Dept: RADIOLOGY | Facility: HOSPITAL | Age: 77
End: 2022-11-21

## 2022-11-21 LAB
ALBUMIN SERPL BCP-MCNC: 2.2 G/DL (ref 3.5–5)
ALP SERPL-CCNC: 69 U/L (ref 46–116)
ALT SERPL W P-5'-P-CCNC: 10 U/L (ref 12–78)
ANION GAP SERPL CALCULATED.3IONS-SCNC: 4 MMOL/L (ref 4–13)
ARTERIAL PATENCY WRIST A: YES
AST SERPL W P-5'-P-CCNC: 9 U/L (ref 5–45)
BASE EXCESS BLDA CALC-SCNC: 6.5 MMOL/L
BASOPHILS # BLD AUTO: 0.02 THOUSANDS/ÂΜL (ref 0–0.1)
BASOPHILS NFR BLD AUTO: 0 % (ref 0–1)
BILIRUB SERPL-MCNC: 0.42 MG/DL (ref 0.2–1)
BUN SERPL-MCNC: 38 MG/DL (ref 5–25)
CALCIUM ALBUM COR SERPL-MCNC: 9.7 MG/DL (ref 8.3–10.1)
CALCIUM SERPL-MCNC: 8.3 MG/DL (ref 8.3–10.1)
CHLORIDE SERPL-SCNC: 101 MMOL/L (ref 96–108)
CO2 SERPL-SCNC: 33 MMOL/L (ref 21–32)
CREAT SERPL-MCNC: 2.32 MG/DL (ref 0.6–1.3)
EOSINOPHIL # BLD AUTO: 0.09 THOUSAND/ÂΜL (ref 0–0.61)
EOSINOPHIL NFR BLD AUTO: 1 % (ref 0–6)
ERYTHROCYTE [DISTWIDTH] IN BLOOD BY AUTOMATED COUNT: 15.4 % (ref 11.6–15.1)
GFR SERPL CREATININE-BSD FRML MDRD: 26 ML/MIN/1.73SQ M
GLUCOSE SERPL-MCNC: 215 MG/DL (ref 65–140)
GLUCOSE SERPL-MCNC: 216 MG/DL (ref 65–140)
GLUCOSE SERPL-MCNC: 219 MG/DL (ref 65–140)
GLUCOSE SERPL-MCNC: 238 MG/DL (ref 65–140)
GLUCOSE SERPL-MCNC: 257 MG/DL (ref 65–140)
HCO3 BLDA-SCNC: 34.8 MMOL/L (ref 22–28)
HCT VFR BLD AUTO: 26 % (ref 36.5–49.3)
HGB BLD-MCNC: 7.5 G/DL (ref 12–17)
IMM GRANULOCYTES # BLD AUTO: 0.13 THOUSAND/UL (ref 0–0.2)
IMM GRANULOCYTES NFR BLD AUTO: 1 % (ref 0–2)
LYMPHOCYTES # BLD AUTO: 0.36 THOUSANDS/ÂΜL (ref 0.6–4.47)
LYMPHOCYTES NFR BLD AUTO: 2 % (ref 14–44)
MAGNESIUM SERPL-MCNC: 2.3 MG/DL (ref 1.6–2.6)
MCH RBC QN AUTO: 25.4 PG (ref 26.8–34.3)
MCHC RBC AUTO-ENTMCNC: 28.8 G/DL (ref 31.4–37.4)
MCV RBC AUTO: 88 FL (ref 82–98)
MONOCYTES # BLD AUTO: 1.22 THOUSAND/ÂΜL (ref 0.17–1.22)
MONOCYTES NFR BLD AUTO: 7 % (ref 4–12)
NASAL CANNULA: 6
NEUTROPHILS # BLD AUTO: 16.08 THOUSANDS/ÂΜL (ref 1.85–7.62)
NEUTS SEG NFR BLD AUTO: 89 % (ref 43–75)
NRBC BLD AUTO-RTO: 0 /100 WBCS
O2 CT BLDA-SCNC: 13 ML/DL (ref 16–23)
OXYHGB MFR BLDA: 95.4 % (ref 94–97)
PCO2 BLDA: 76.2 MM HG (ref 36–44)
PH BLDA: 7.28 [PH] (ref 7.35–7.45)
PHOSPHATE SERPL-MCNC: 4.3 MG/DL (ref 2.3–4.1)
PLATELET # BLD AUTO: 153 THOUSANDS/UL (ref 149–390)
PMV BLD AUTO: 10.5 FL (ref 8.9–12.7)
PO2 BLDA: 128.7 MM HG (ref 75–129)
POTASSIUM SERPL-SCNC: 4 MMOL/L (ref 3.5–5.3)
PROCALCITONIN SERPL-MCNC: 2.67 NG/ML
PROT SERPL-MCNC: 6.1 G/DL (ref 6.4–8.4)
RBC # BLD AUTO: 2.95 MILLION/UL (ref 3.88–5.62)
SODIUM SERPL-SCNC: 138 MMOL/L (ref 135–147)
SPECIMEN SOURCE: ABNORMAL
WBC # BLD AUTO: 17.9 THOUSAND/UL (ref 4.31–10.16)

## 2022-11-21 RX ORDER — BUDESONIDE 0.5 MG/2ML
0.5 INHALANT ORAL
Status: DISCONTINUED | OUTPATIENT
Start: 2022-11-21 | End: 2022-12-02 | Stop reason: HOSPADM

## 2022-11-21 RX ORDER — ALBUMIN (HUMAN) 12.5 G/50ML
25 SOLUTION INTRAVENOUS ONCE
Status: COMPLETED | OUTPATIENT
Start: 2022-11-21 | End: 2022-11-21

## 2022-11-21 RX ORDER — DEXMEDETOMIDINE HYDROCHLORIDE 4 UG/ML
.1-.7 INJECTION, SOLUTION INTRAVENOUS
Status: DISCONTINUED | OUTPATIENT
Start: 2022-11-21 | End: 2022-11-22

## 2022-11-21 RX ORDER — FORMOTEROL FUMARATE 20 UG/2ML
20 SOLUTION RESPIRATORY (INHALATION)
Status: DISCONTINUED | OUTPATIENT
Start: 2022-11-21 | End: 2022-12-02 | Stop reason: HOSPADM

## 2022-11-21 RX ORDER — SODIUM CHLORIDE, SODIUM GLUCONATE, SODIUM ACETATE, POTASSIUM CHLORIDE, MAGNESIUM CHLORIDE, SODIUM PHOSPHATE, DIBASIC, AND POTASSIUM PHOSPHATE .53; .5; .37; .037; .03; .012; .00082 G/100ML; G/100ML; G/100ML; G/100ML; G/100ML; G/100ML; G/100ML
1000 INJECTION, SOLUTION INTRAVENOUS ONCE
Status: COMPLETED | OUTPATIENT
Start: 2022-11-21 | End: 2022-11-22

## 2022-11-21 RX ADMIN — TAMSULOSIN HYDROCHLORIDE 0.4 MG: 0.4 CAPSULE ORAL at 15:32

## 2022-11-21 RX ADMIN — PANTOPRAZOLE SODIUM 40 MG: 40 TABLET, DELAYED RELEASE ORAL at 15:25

## 2022-11-21 RX ADMIN — GABAPENTIN 100 MG: 100 CAPSULE ORAL at 21:08

## 2022-11-21 RX ADMIN — CEFEPIME 2000 MG: 2 INJECTION, POWDER, FOR SOLUTION INTRAVENOUS at 10:22

## 2022-11-21 RX ADMIN — LEVALBUTEROL HYDROCHLORIDE 1.25 MG: 1.25 SOLUTION, CONCENTRATE RESPIRATORY (INHALATION) at 07:26

## 2022-11-21 RX ADMIN — ALBUMIN (HUMAN) 25 G: 0.25 INJECTION, SOLUTION INTRAVENOUS at 10:31

## 2022-11-21 RX ADMIN — CEFEPIME 2000 MG: 2 INJECTION, POWDER, FOR SOLUTION INTRAVENOUS at 21:06

## 2022-11-21 RX ADMIN — OLANZAPINE 5 MG: 5 TABLET, ORALLY DISINTEGRATING ORAL at 02:11

## 2022-11-21 RX ADMIN — PRAVASTATIN SODIUM 80 MG: 80 TABLET ORAL at 15:32

## 2022-11-21 RX ADMIN — FUROSEMIDE 20 MG: 10 INJECTION, SOLUTION INTRAMUSCULAR; INTRAVENOUS at 08:10

## 2022-11-21 RX ADMIN — LEVALBUTEROL HYDROCHLORIDE 1.25 MG: 1.25 SOLUTION, CONCENTRATE RESPIRATORY (INHALATION) at 19:24

## 2022-11-21 RX ADMIN — IPRATROPIUM BROMIDE 0.5 MG: 0.5 SOLUTION RESPIRATORY (INHALATION) at 07:26

## 2022-11-21 RX ADMIN — IPRATROPIUM BROMIDE 0.5 MG: 0.5 SOLUTION RESPIRATORY (INHALATION) at 19:24

## 2022-11-21 RX ADMIN — INSULIN LISPRO 2 UNITS: 100 INJECTION, SOLUTION INTRAVENOUS; SUBCUTANEOUS at 15:47

## 2022-11-21 RX ADMIN — GABAPENTIN 100 MG: 100 CAPSULE ORAL at 15:25

## 2022-11-21 RX ADMIN — FLUTICASONE PROPIONATE 1 PUFF: 110 AEROSOL, METERED RESPIRATORY (INHALATION) at 22:16

## 2022-11-21 RX ADMIN — INSULIN GLARGINE 40 UNITS: 100 INJECTION, SOLUTION SUBCUTANEOUS at 21:10

## 2022-11-21 RX ADMIN — BUDESONIDE 0.5 MG: 0.5 INHALANT ORAL at 19:24

## 2022-11-21 RX ADMIN — LEVALBUTEROL HYDROCHLORIDE 1.25 MG: 1.25 SOLUTION, CONCENTRATE RESPIRATORY (INHALATION) at 13:47

## 2022-11-21 RX ADMIN — INSULIN LISPRO 2 UNITS: 100 INJECTION, SOLUTION INTRAVENOUS; SUBCUTANEOUS at 11:43

## 2022-11-21 RX ADMIN — FORMOTEROL FUMARATE DIHYDRATE 20 MCG: 20 SOLUTION RESPIRATORY (INHALATION) at 19:23

## 2022-11-21 RX ADMIN — SODIUM CHLORIDE, SODIUM GLUCONATE, SODIUM ACETATE, POTASSIUM CHLORIDE AND MAGNESIUM CHLORIDE 1000 ML: 526; 502; 368; 37; 30 INJECTION, SOLUTION INTRAVENOUS at 23:13

## 2022-11-21 RX ADMIN — VANCOMYCIN HYDROCHLORIDE 1000 MG: 1 INJECTION, SOLUTION INTRAVENOUS at 11:40

## 2022-11-21 RX ADMIN — APIXABAN 5 MG: 5 TABLET, FILM COATED ORAL at 17:14

## 2022-11-21 RX ADMIN — DEXMEDETOMIDINE HYDROCHLORIDE 0.1 MCG/KG/HR: 400 INJECTION INTRAVENOUS at 17:13

## 2022-11-21 RX ADMIN — IPRATROPIUM BROMIDE 0.5 MG: 0.5 SOLUTION RESPIRATORY (INHALATION) at 13:47

## 2022-11-21 NOTE — ASSESSMENT & PLAN NOTE
· Multifactorial in the setting of CHF exacerbation, possible pneumonia, history severe COPD suspected JACQUELINE/OHS, Lung CA s/p SBRT  · Patient on 2L NC at baseline  · Started on HFNC  · Can attempt to wean to [address]   · Can trial Bipap HS and PRN if patient allows   · Patient received lasix x 2 in the ED  · Continue with aggressive diuresis in the setting of volume overload   · Started on Cefepime and Vancomyin in the ED for presumed pneumonia  · CT imaging of his chest showed a ew near complete occlusion of middle lobe and right lower lobe bronchial branches with atelectasis   · Continue nebulizers

## 2022-11-21 NOTE — ASSESSMENT & PLAN NOTE
· Patient with documented chronic recurrent pleural effusion on the right   · S/p Thoracentesis on 11/08/22  · Cytology: Minute cluster of atypical cells noted in cellblock material only  · Atypical cluster is favored to represent contamination during specimen processing; however, given the patient's history of lung adenocarcinoma, involvement by malignancy cannot be definitively ruled out     · Consider repeat thoracentesis with cytology

## 2022-11-21 NOTE — ASSESSMENT & PLAN NOTE
· Continue home coreg and imdur  · Started on nitro gtt in the ED for SBP >180  · Can wean off as tolerated

## 2022-11-21 NOTE — ASSESSMENT & PLAN NOTE
Lab Results   Component Value Date    EGFR 31 11/20/2022    EGFR 31 11/09/2022    EGFR 39 11/08/2022    CREATININE 2 01 (H) 11/20/2022    CREATININE 2 00 (H) 11/09/2022    CREATININE 1 68 (H) 11/08/2022     · Baseline creatine 1 8-2  · Creatinine on admission 2 01  · Continue to trend creatine   · Will continue on IV Lasix 40 mg b i d  for CHF exacerbation/volume overload

## 2022-11-21 NOTE — ASSESSMENT & PLAN NOTE
· Continue home Eliquis for [facility]  · Continue home coreg   · Monitor on telemetry

## 2022-11-21 NOTE — PROGRESS NOTES
Patient with documented SBP in the 80's while on nitro gtt and following lasix x 2  As a result of his hypotension, he meets severe sepsis criteria  He presented in acute on chronic hypoxic respiratory failure secondary to CHF exacerbation and possible pneumonia based on CT imaging  Will give 100cc of isolyte but hold on further fluids- See documentation below  Discontinue nitro gtt  Check lactic acid  Initial LA 2 0    The 30ml/kg fluid bolus was not given to the patient despite hypotension and/or significantly elevated lactate of ? 4 and/or presence of septic shock due to: Concern for fluid/volume overload  The patient will be administered 100cc of isolyte of crystalloid fluid instead  Orders for this have been placed in Epic  The patient may receive additional colloid or crystalloid fluids thereafter based on clinical condition       Twyla Bynum PA-C

## 2022-11-21 NOTE — PLAN OF CARE
Problem: Potential for Falls  Goal: Patient will remain free of falls  Description: INTERVENTIONS:  - Educate patient/family on patient safety including physical limitations  - Instruct patient to call for assistance with activity   - Consult OT/PT to assist with strengthening/mobility   - Keep Call bell within reach  - Keep bed low and locked with side rails adjusted as appropriate  - Keep care items and personal belongings within reach  - Initiate and maintain comfort rounds  - Make Fall Risk Sign visible to staff  - Offer Toileting every 2 Hours, in advance of need  - Initiate/Maintain bed alarm  - Obtain necessary fall risk management equipment:call bell within reach  - Apply yellow socks and bracelet for high fall risk patients  - Consider moving patient to room near nurses station  Outcome: Progressing     Problem: RESPIRATORY - ADULT  Goal: Achieves optimal ventilation and oxygenation  Description: INTERVENTIONS:  - Assess for changes in respiratory status  - Assess for changes in mentation and behavior  - Position to facilitate oxygenation and minimize respiratory effort  - Oxygen administered by appropriate delivery if ordered  - Initiate smoking cessation education as indicated  - Encourage broncho-pulmonary hygiene including cough, deep breathe, Incentive Spirometry  - Assess the need for suctioning and aspirate as needed  - Assess and instruct to report SOB or any respiratory difficulty  - Respiratory Therapy support as indicated  Outcome: Progressing

## 2022-11-21 NOTE — CASE MANAGEMENT
Case Management Assessment & Discharge Planning Note    Patient name Ramandeep Horan   Location [facility] 887-90 MRN [id number]  : 1945 Date 2022       Current Admission Date: 2022  Current Admission Diagnosis:Acute on chronic diastolic heart failure [facility])   Patient Active Problem List    Diagnosis Date Noted   • Sepsis ([facility] ) 2022   • Pneumonia 2022   • Acute on chronic diastolic heart failure ([facility] ) 2022   • Shortness of breath 2022   • Acute on chronic anemia 10/29/2022   • Acute-on-chronic kidney injury ([facility] ) 10/27/2022   • SIRS (systemic inflammatory response syndrome) ([place] [address] ) 10/27/2022   • A-fib ([place] [address] ) 10/10/2022   • Frequent hospital admissions 2022   • Hypothermia 2022   • Epididymitis, bilateral 06/15/2022   • Chronic left-sided low back pain without sciatica 2022   • Stage 3b chronic kidney disease (CKD) ([facility] ) 2022   • History of prostate cancer 2022   • Adenocarcinoma of lung ([place] [address] ) 2022   • Fracture of navicular bone of left foot 2021   • Acute on chronic respiratory failure with hypoxia and hypercapnia ([facility] ) 04/15/2021   • PAD (peripheral artery disease) ([facility] ) 2021   • DDD (degenerative disc disease), lumbar 2020   • Anemia, iron deficiency 2020   • Lung nodule 2020   • Pulmonary hypertension ([facility] ) 2019   • JACQUELINE (obstructive sleep apnea) 2019   • COPD with acute exacerbation ([facility] ) 2019   • Oxygen dependent 2018   • Acute metabolic encephalopathy    • RBBB 2018   • CAD (coronary artery disease) 2018   • Chronic diastolic heart failure ([facility] ) 2018   • Pleural effusion on right 10/31/2017   • Diabetic neuropathy ([facility] ) 2017   • Essential hypertension 2016   • Venous stasis dermatitis of both lower extremities 11/15/2016   • Type 2 diabetes mellitus with hyperglycemia, with long-term current use of insulin ([facility] ) 2016   • COPD (chronic obstructive pulmonary disease) ([facility] ) 01/20/2016   • HLD (hyperlipidemia) 01/20/2016      LOS (days): 1  Geometric Mean LOS (GMLOS) (days):   Days to GMLOS:     OBJECTIVE:  PATIENT READMITTED TO HOSPITAL  Risk of Unplanned Readmission Score: 85 36         Current admission status: Inpatient       Preferred Pharmacy:   [address] [place] [address], [address] [place] [address] [address] [facility]  [address] [place] [address]  Phone: [phone] Fax: [phone]    [address] [place], [facility] [address] [facility] 94 [address] [facility] 38 210 [facility]  Phone: [phone] Fax: [phone]    Primary Care Provider: Jerlene Sacks, MD    Primary Insurance: [facility]  Secondary Insurance:     ASSESSMENT:  [address] Representative - Son   Primary Phone: [phone] (Home)  Mobile Phone: [phone]                         Readmission Root Cause  30 Day Readmission: Yes  Who directed you to return to the hospital?: Self  Did you understand whom to contact if you had questions or problems?: Yes  Did you get your prescriptions before you left the hospital?: Yes  During previous admission, was a post-acute recommendation made?: Yes  What post-acute resources were offered?: STR, Offered, but declined  Patient was readmitted due to: pneumonia/SOB  Action Plan: admitted to [facility], currently on bi-pap    Patient Information  Admitted from[de-identified] Home  Mental Status: Other (Comment) (currently on bi-pap)  During Assessment patient was accompanied by: Not accompanied during assessment  Assessment information provided by[de-identified] Other - please comment (chart review, patient on bi-pap)  Primary Caregiver: Self  Support Systems: [facility] of Residence: [address] [facility] do you live in?: [employer] entry access options   Select all that apply : No steps to enter home  Type of Current Residence: 2 story home  Upon entering residence, is there a bedroom on the main floor (no further steps)?: Yes  Upon entering residence, is there a bathroom on the main floor (no further steps)?: Yes  In the last 12 months, was there a time when you were not able to pay the mortgage or rent on time?: No  In the last 12 months, was there a time when you did not have a steady place to sleep or slept in a shelter (including now)?: No  Living Arrangements: Other (Comment) (unclear at this time)  Is patient a ?: No    Activities of Daily Living Prior to Admission  Functional Status: Independent  Completes ADLs independently?: Yes  Ambulates independently?: Yes  Does patient use assisted devices?: Yes  Assisted Devices (DME) used: Straight Cane, Home Oxygen concentrator, Portable Oxygen concentrator  DME Company Name (respiratory supplies):  [employer]  O2 Rate(s): 2L  Does patient currently own DME?: Yes  What DME does the patient currently own?: Home Oxygen concentrator, Portable Oxygen concentrator, Straight Cane, Shower Chair  Does the patient have a history of Short-Term Rehab?: No  Does patient have a history of HHC?: Yes (patient has been referred to home health numerous times, with difficulty starting care due to numerous readmissions)         Patient Information Continued  Income Source: Pension/intermediate  Within the past 12 months, you worried that your food would run out before you got the money to buy more : Never true  Within the past 12 months, the food you bought just didn't last and you didn't have money to get more : Never true  Food insecurity resource given?: N/A  Does patient receive dialysis treatments?: No  Does patient have a history of substance abuse?: No  Does patient have a history of Mental Health Diagnosis?: No         Means of Transportation  In the past 12 months, has lack of transportation kept you from medical appointments or from getting medications?: No  In the past 12 months, has lack of transportation kept you from meetings, work, or from getting things needed for daily living?: No        DISCHARGE DETAILS:    Discharge planning discussed with[de-identified] chart review, left message for raman Young List of Choice: Yes     CM contacted family/caregiver?: Yes (left message for raman Culver )  Were Treatment Team discharge recommendations reviewed with patient/caregiver?: No (awaiting recommendations)          Contacts  Patient Contacts: raman West  Relationship to Patient[de-identified] Family  Contact Method: Phone  Phone Number: [phone]  Reason/Outcome: Discharge Planning, Emergency Contact                        Treatment Team Recommendation: Other (TBD)  Discharge Destination Plan[de-identified] Other (TBD -- awaiting recommendations)  Transport at Discharge : Other (Comment) (TBD)                   Patient/caregiver received discharge checklist   Content reviewed  Patient/caregiver encouraged to participate in discharge plan of care prior to discharge home  CM reviewed d/c planning process including the following: identifying help at home, patient preference for d/c planning needs, Discharge Lounge, [facility] Meds to Bed program, availability of treatment team to discuss questions or concerns patient and/or family may have regarding understanding medications and recognizing signs and symptoms once discharged  CM also encouraged patient to follow up with all recommended appointments after discharge  Patient advised of importance for patient and family to participate in managing patient’s medical well being  Additional Comments: Patient with numerous readmissions  Patient currently on bi-pap, left a message for raman Culver  to discuss discharge plan  STR recommended in the past, patient has declined  Attempted to set up home health care numerous times, however has not been able to fully start due to frequent readmissions    [employer] referral placed during last admission, with a response that they would not be able to formulate a plan and that long-term placement would be the best option  Noted in H&P that patient faces eviction due to hoarding behaviors, CM will continue to assess  CM to follow

## 2022-11-21 NOTE — ASSESSMENT & PLAN NOTE
· Secondary to possible pneumonia  · Started on Cefepime and Vancomycin in the ED- will continue on admission   · Trend WBC

## 2022-11-21 NOTE — ASSESSMENT & PLAN NOTE
Lab Results   Component Value Date    HGBA1C 9 7 (H) 10/10/2022       Recent Labs     11/20/22  1412 11/20/22  1812   POCGLU 377* 466*       Blood Sugar Average: Last 72 hrs:  (P) 421 5     · Holding mealtime insulin until patient able to take adequate PO   · Continue lantus at HS   · Start ISS  · Goal -180  · Consider insulin gtt if unable to control BG

## 2022-11-21 NOTE — CONSULTS
[facility]  Consult & ICU Acceptance Note- Jude Boudreaux Sr  1945, 78 y o  male MRN: [id number]  Unit/Bed#: [facility] 201-01 Encounter: [id number]  Primary Care Provider: Anne Finley MD   Date and time admitted to hospital: 11/20/2022 10:40 AM    Consults    Acute on chronic respiratory failure with hypoxia and hypercapnia ([address] )  Assessment & Plan  · Multifactorial in the setting of CHF exacerbation, possible pneumonia, history severe COPD suspected JACQUELINE/OHS, Lung CA s/p SBRT  · Patient on 2L NC at baseline  · Started on HFNC  · Can attempt to wean to [address]   · Can trial Bipap HS and PRN if patient allows   · Patient received lasix x 2 in the ED  · Continue with aggressive diuresis in the setting of volume overload   · Started on Cefepime and Vancomyin in the ED for presumed pneumonia  · CT imaging of his chest showed a ew near complete occlusion of middle lobe and right lower lobe bronchial branches with atelectasis   · Continue nebulizers     * Acute on chronic diastolic heart failure (HCC)  Assessment & Plan  Wt Readings from Last 3 Encounters:   11/20/22 109 kg (240 lb)   11/09/22 103 kg (227 lb 3 2 oz)   11/01/22 97 4 kg (214 lb 11 7 oz)       · Patient with documented dHF with pEF  · Last echocardiogram from 5/22: Left ventricular cavity size is normal  Wall thickness is increased  The left ventricular ejection fraction is 60%   Systolic function is normal   Wall motion is normal  Diastolic function is normal   · Continue with lasix BID  · Consider cardiology consult  · Continue to monitor on telemetry       Pneumonia  Assessment & Plan  · CT imaging of his chest showed a ew near complete occlusion of middle lobe and right lower lobe bronchial branches with atelectasis   · Started Cefepime and Vancomycin  · Trend WBC     Sepsis ([address] )  Assessment & Plan  · Secondary to possible pneumonia  · Started on Cefepime and Vancomycin in the ED- will continue on admission   · Trend WBC    Pleural effusion on right  Assessment & Plan  · Patient with documented chronic recurrent pleural effusion on the right   · S/p Thoracentesis on 11/08/22  · Cytology: Minute cluster of atypical cells noted in cellblock material only  · Atypical cluster is favored to represent contamination during specimen processing; however, given the patient's history of lung adenocarcinoma, involvement by malignancy cannot be definitively ruled out     · Consider repeat thoracentesis with cytology     Type 2 diabetes mellitus with hyperglycemia, with long-term current use of insulin [facility])  Assessment & Plan  Lab Results   Component Value Date    HGBA1C 9 7 (H) 10/10/2022       Recent Labs     11/20/22  1412 11/20/22  1812   POCGLU 377* 466*       Blood Sugar Average: Last 72 hrs:  (P) 421 5     · Holding mealtime insulin until patient able to take adequate PO   · Continue lantus at HS   · Start ISS  · Goal -180  · Consider insulin gtt if unable to control BG    CAD (coronary artery disease)  Assessment & Plan  · History of CAD with stenting in 2015  · Stent to D1, mid RCA and distal RCA  · Continue GDMT  · ASA  · Coreg  · Statin    HLD (hyperlipidemia)  Assessment & Plan  · Continue home statin therapy     A-fib (HCC)  Assessment & Plan  · Continue home Eliquis for [facility]  · Continue home coreg   · Monitor on telemetry     Stage 3b chronic kidney disease (CKD) [facility])  Assessment & Plan  Lab Results   Component Value Date    EGFR 31 11/20/2022    EGFR 31 11/09/2022    EGFR 39 11/08/2022    CREATININE 2 01 (H) 11/20/2022    CREATININE 2 00 (H) 11/09/2022    CREATININE 1 68 (H) 11/08/2022     · Baseline creatine 1 8-2  · Creatinine on admission 2 01  · Continue to trend creatine   · Will continue on IV Lasix 40 mg b i d  for CHF exacerbation/volume overload     Adenocarcinoma of lung ([facility] [address] )  Assessment & Plan  · Patient with history of adeno carcinoma status post resection and radiation therapy    Essential hypertension  Assessment & Plan  · Continue home coreg and imdur  · Started on nitro gtt in the ED for SBP >180  · Can wean off as tolerated    COPD (chronic obstructive pulmonary disease) (HCC)  Assessment & Plan  · Severe COPD FEV1 33%  · Patient on chronic supplemental 02- 2L NC  · Unlikely acute exacerbation   · Continue Flovent  · Start Xopenex and atrovent Q6    -------------------------------------------------------------------------------------------------------------  Chief Complaint: SOB, Increased WOB    History of Present Illness     Mahad Ellis Sr  is a 78 y o  male that presented to the ED today with worsening SOB  He has numerous hospitalizations this years with multiple medical problems to include severe COPD FEV1 33%, chronic hypoxic/hypercarbic respiratory failure JJ 1Y/RAA, chronic diastolic CHF, atrial fibrillation on eliquis, lung cancer s/p SBRT, chronic right exudative/lymphocytic effusion, DM, GERD, GIB secondary to duodenal ulcer, HTN, HLP, and suspected JACQUELINE/OHVS     Worsening hypoxic respiratory failure thought likely secondary to acute CHF exacerbation, possible pneumonia and less likely COPD exacerbation  Placed on HFNC in the ED  WOB and Sp02 improved with lasix, nitro gtt  Started on antibiotics for possible infection given acute decline in respiratory status, elevated WBC, and findings of new near complete occlusion of middle lobe and right lower lobe bronchial branches with atelectasis on CT imaging of his chest      He remains on HFNC  Plan to admit to ICCU for close monitoring  The patient states that he has not felt well for months  He states that his breathing has worsened over the past "couple days " He denies fevers or chills  Denies sick contacts  He is taking his medications      History obtained from chart review and the patient   -------------------------------------------------------------------------------------------------------------  Dispo:  Admit to Stepdown Level 1    Code Status: Level 1 - Full Code  --------------------------------------------------------------------------------------------------------------  Review of Systems   Constitutional: Positive for activity change and fatigue  Negative for chills  Respiratory: Positive for cough and shortness of breath  Hematological: Bruises/bleeds easily  All other systems reviewed and are negative  A 12-point, complete review of systems was reviewed and negative except as stated above     Physical Exam  Vitals reviewed  Constitutional:       Appearance: He is obese  He is ill-appearing  HENT:      Head: Normocephalic  Nose: Nose normal       Mouth/Throat:      Mouth: Mucous membranes are moist    Eyes:      Pupils: Pupils are equal, round, and reactive to light  Cardiovascular:      Rate and Rhythm: Rhythm irregular  Pulmonary:      Effort: Tachypnea present  Breath sounds: Rales present  Comments: Patient is minimal distress   Abdominal:      General: There is no distension  Tenderness: There is no abdominal tenderness  Musculoskeletal:         General: Normal range of motion  Right lower leg: Edema present  Left lower leg: Edema present  Skin:     Capillary Refill: Capillary refill takes 2 to 3 seconds  Findings: Bruising present  Neurological:      General: No focal deficit present  Mental Status: He is alert          --------------------------------------------------------------------------------------------------------------  Vitals:   Vitals:    11/20/22 1930 11/20/22 1945 11/20/22 2045 11/20/22 2100   BP: 136/63 129/62 108/57 104/51   BP Location: Right arm Right arm Right arm    Pulse: 100 100 96 94   Resp: (!) 24 (!) 24 (!) 24    Temp:       TempSrc:       SpO2: 99% 99% 99% 99%   Weight:         Temp  Min: 98 1 °F (36 7 °C)  Max: 98 1 °F (36 7 °C)        Body mass index is 32 55 kg/m²      Laboratory and Diagnostics:  Results from last 7 days   Lab Units 11/20/22  1102   WBC Thousand/uL 17 88*   HEMOGLOBIN g/dL 8 7*   HEMATOCRIT % 29 6*   PLATELETS Thousands/uL 183   NEUTROS PCT % 90*   MONOS PCT % 8     Results from last 7 days   Lab Units 11/20/22  1102   SODIUM mmol/L 137   POTASSIUM mmol/L 4 1   CHLORIDE mmol/L 102   CO2 mmol/L 29   ANION GAP mmol/L 6   BUN mg/dL 31*   CREATININE mg/dL 2 01*   CALCIUM mg/dL 8 4   GLUCOSE RANDOM mg/dL 375*   ALT U/L 12   AST U/L 7   ALK PHOS U/L 84   ALBUMIN g/dL 2 7*   TOTAL BILIRUBIN mg/dL 0 47          Results from last 7 days   Lab Units 11/20/22  1235   INR  1 08   PTT seconds 26          Results from last 7 days   Lab Units 11/20/22  1235   LACTIC ACID mmol/L 2 0     ABG:  Results from last 7 days   Lab Units 11/20/22  1738   PH ART  7 310*   PCO2 ART mm Hg 67 8*   PO2 ART mm Hg 94 1   HCO3 ART mmol/L 33 4*   BASE EXC ART mmol/L 5 8   ABG SOURCE  Radial, Left     VBG:  Results from last 7 days   Lab Units 11/20/22  1738 11/20/22  1606   PH MAYE   --  7 243*   PCO2 MAYE mm Hg  --  81 0*   PO2 MAYE mm Hg  --  31 5*   HCO3 MAYE mmol/L  --  34 2*   BASE EXC MAYE mmol/L  --  5 3   ABG SOURCE  Radial, Left  --      Results from last 7 days   Lab Units 11/20/22  1102   PROCALCITONIN ng/ml 0 26*       Micro:  Results from last 7 days   Lab Units 11/20/22  1245 11/20/22  1220   BLOOD CULTURE  Received in Microbiology Lab  Culture in Progress  Received in Microbiology Lab  Culture in Progress  EKG: IRR  Imaging: I have personally reviewed pertinent reports     and I have personally reviewed pertinent films in PACS      Historical Information   Past Medical History:   Diagnosis Date   • Abdominal pain    • MARANDA (acute kidney injury) ([facility] [address] ) 6/19/2018   • Cardiac disease    • CHF (congestive heart failure) (HCC)    • COPD, severe (HCC)    • Coronary artery disease    • DDD (degenerative disc disease), lumbar 9/1/2020   • Diabetes mellitus ([facility] [address] )    • Dyspnea    • GERD (gastroesophageal reflux disease)    • Hyperlipidemia • Hypertension    • MI (myocardial infarction) ([facility] [address] )     with 3 stents   • Nodule of apex of right lung    • JACQUELINE (obstructive sleep apnea)    • Prostate cancer [facility])      Past Surgical History:   Procedure Laterality Date   • ABDOMINAL SURGERY      exploratory   • ANGIOPLASTY      3 stents   • APPENDECTOMY     • COLONOSCOPY     • CT NEEDLE BIOPSY LUNG  11/3/2020   • ESOPHAGOGASTRODUODENOSCOPY N/A 10/2/2017    Procedure: ESOPHAGOGASTRODUODENOSCOPY (EGD); Surgeon: Birgit Bruner MD;  Location: [facility];   Service: Gastroenterology   • IR THORACENTESIS  11/3/2020   • IR THORACENTESIS  2022   • KNEE CARTILAGE SURGERY     • OTHER SURGICAL HISTORY      stent placement   • PROSTATE SURGERY     • SKIN GRAFT      Basal cell CA back     Social History   Social History     Substance and Sexual Activity   Alcohol Use Never     Social History     Substance and Sexual Activity   Drug Use No     Social History     Tobacco Use   Smoking Status Former   • Packs/day: 1 50   • Years: 50 00   • Pack years: 75 00   • Types: Cigarettes   • Start date: 38   • Quit date: 2020   • Years since quittin 3   Smokeless Tobacco Never     Family History:   Family History   Problem Relation Age of Onset   • Heart disease Father    • Other Father         Mesothelioma      I have reviewed this patient's family history and commented on sigificant items within the HPI      Medications:  Current Facility-Administered Medications   Medication Dose Route Frequency   • [START ON 2022] apixaban (ELIQUIS) tablet 5 mg  5 mg Oral BID   • [START ON 2022] aspirin (ECOTRIN LOW STRENGTH) EC tablet 81 mg  81 mg Oral Daily   • carvedilol (COREG) tablet 6 25 mg  6 25 mg Oral BID With Meals   • cefepime (MAXIPIME) 2 g/50 mL dextrose IVPB  2,000 mg Intravenous Q12H   • fluticasone (FLOVENT HFA) 110 MCG/ACT inhaler 1 puff  1 puff Inhalation Q12H [facility] & intermediate   • furosemide (LASIX) injection 40 mg  40 mg Intravenous BID (diuretic)   • gabapentin (NEURONTIN) capsule 100 mg  100 mg Oral TID   • insulin glargine (LANTUS) subcutaneous injection 40 Units 0 4 mL  40 Units Subcutaneous HS   • insulin lispro (HumaLOG) 100 units/mL subcutaneous injection 1-5 Units  1-5 Units Subcutaneous TID AC   • ipratropium (ATROVENT) 0 02 % inhalation solution 0 5 mg  0 5 mg Nebulization Q6H   • isosorbide mononitrate (IMDUR) 24 hr tablet 30 mg  30 mg Oral Daily   • levalbuterol (XOPENEX) inhalation solution 1 25 mg  1 25 mg Nebulization Q6H   • nitroGLYcerin (TRIDIL) 50 mg in 250 mL infusion (premix)  5-200 mcg/min Intravenous Titrated   • ondansetron (ZOFRAN) injection 4 mg  4 mg Intravenous Q6H PRN   • pantoprazole (PROTONIX) EC tablet 40 mg  40 mg Oral BID AC   • pravastatin (PRAVACHOL) tablet 80 mg  80 mg Oral Daily With Dinner   • senna-docusate sodium (SENOKOT S) 8 6-50 mg per tablet 1 tablet  1 tablet Oral HS   • tamsulosin (FLOMAX) capsule 0 4 mg  0 4 mg Oral Daily With Dinner   • [START ON 11/21/2022] vancomycin (VANCOCIN) IVPB (premix in dextrose) 1,000 mg 200 mL  1,000 mg Intravenous Q24H     Home medications:  Prior to Admission Medications   Prescriptions Last Dose Informant Patient Reported? Taking? Alcohol Swabs 70 % PADS   No No   Sig: May substitute brand based on insurance coverage  Check glucose TID  Blood Glucose Monitoring Suppl (OneTouch Verio Reflect) w/Device KIT   No No   Sig: May substitute brand based on insurance coverage  Check glucose TID  Insulin Glargine Solostar (Lantus SoloStar) 100 UNIT/ML SOPN   No Yes   Sig: Inject 0 3 mL (30 Units total) under the skin daily at bedtime   Insulin Pen Needle (BD Pen Needle Beena 2nd Gen) 32G X 4 MM MISC   No No   Sig: For use with insulin pen  Pharmacy may dispense brand covered by insurance  Insulin Pen Needle (BD Pen Needle Ebena 2nd Gen) 32G X 4 MM MISC   No No   Sig: For use with insulin pen  Pharmacy may dispense brand covered by insurance     OneTouch Delica Lancets 51W MISC   No No   Sig: May substitute brand based on insurance coverage  Check glucose TID  apixaban (ELIQUIS) 5 mg   No Yes   Sig: Take 1 tablet (5 mg total) by mouth 2 (two) times a day   aspirin (ECOTRIN LOW STRENGTH) 81 mg EC tablet   No Yes   Sig: Take 1 tablet (81 mg total) by mouth daily   carvedilol (COREG) 6 25 mg tablet   No Yes   Sig: Take 1 tablet (6 25 mg total) by mouth 2 (two) times a day with meals   cyclobenzaprine (FLEXERIL) 10 mg tablet   No Yes   Sig: Take 1 tablet (10 mg total) by mouth 2 (two) times a day as needed for muscle spasms   ferrous sulfate 325 (65 Fe) mg tablet   No Yes   Sig: Take 1 tablet (325 mg total) by mouth daily with breakfast   fluticasone-vilanterol (BREO ELLIPTA) 100-25 mcg/inh inhaler   No Yes   Sig: Inhale 1 puff daily Rinse mouth after use  furosemide (LASIX) 40 mg tablet   No Yes   Sig: Take 1 tablet (40 mg total) by mouth daily   gabapentin (NEURONTIN) 100 mg capsule   No Yes   Sig: Take 1 capsule (100 mg total) by mouth 3 (three) times a day   glucose blood (OneTouch Verio) test strip   No No   Sig: May substitute brand based on insurance coverage  Check glucose TID  glucose blood test strip   No No   Sig: Use 1 each daily as needed (check sugars) Use as instructed   Uses One Touch Ultra test strip   insulin lispro (HumaLOG KwikPen) 100 units/mL injection pen   No Yes   Sig: Inject 10 Units under the skin 3 (three) times a day with meals   isosorbide mononitrate (IMDUR) 30 mg 24 hr tablet   No Yes   Sig: Take 1 tablet (30 mg total) by mouth daily   pantoprazole (PROTONIX) 40 mg tablet   No Yes   Sig: Take 1 tablet (40 mg total) by mouth 2 (two) times a day before meals   simvastatin (ZOCOR) 40 mg tablet   No Yes   Sig: Take 1 tablet (40 mg total) by mouth daily   tamsulosin (FLOMAX) 0 4 mg   No Yes   Sig: Take 1 capsule (0 4 mg total) by mouth daily with dinner   umeclidinium bromide (INCRUSE ELLIPTA) 62 5 mcg/inh AEPB inhaler   No Yes   Sig: Inhale 1 puff daily Do not start before October 16, 2022  Facility-Administered Medications: None     Allergies: Allergies   Allergen Reactions   • Crestor [Rosuvastatin] Other (See Comments)     Unknown     • Lisinopril GI Intolerance, Dizziness and Abdominal Pain   • Metformin GI Intolerance     ------------------------------------------------------------------------------------------------------------  Advance Directive and Living Will:      Power of :    POLST:    ------------------------------------------------------------------------------------------------------------  Care Time Delivered:   No Critical Care time spent       Freedom Sinha PA-C        Portions of the record may have been created with voice recognition software  Occasional wrong word or "sound a like" substitutions may have occurred due to the inherent limitations of voice recognition software    Read the chart carefully and recognize, using context, where substitutions have occurred

## 2022-11-21 NOTE — CONSULTS
Vancomycin IV Pharmacy-to-Dose Consultation    Margarette Mednes is a 78 y o  male who was receiving Vancomycin IV with management by the Pharmacy Consult service  The patient’s Vancomycin therapy has been discontinued  Thank you for allowing us to take part in this patient's care  Pharmacy will sign-off now; please call or re-consult if there are any questions          Loli Galaviz, PharmD, [address] Clinical Pharmacist  [phone]

## 2022-11-21 NOTE — ASSESSMENT & PLAN NOTE
Wt Readings from Last 3 Encounters:   11/20/22 109 kg (240 lb)   11/09/22 103 kg (227 lb 3 2 oz)   11/01/22 97 4 kg (214 lb 11 7 oz)       · Patient with documented dHF with pEF  · Last echocardiogram from 5/22: Left ventricular cavity size is normal  Wall thickness is increased  The left ventricular ejection fraction is 60%   Systolic function is normal   Wall motion is normal  Diastolic function is normal   · Continue with lasix BID  · Consider cardiology consult  · Continue to monitor on telemetry

## 2022-11-21 NOTE — OCCUPATIONAL THERAPY NOTE
Occupational Therapy Cancel Note        Patient Name: Memory Canavan  Today's Date: 11/21/2022 11/21/22 0914   OT Last Visit   OT Visit Date 11/21/22   Note Type   Note type Evaluation; Cancelled Session   Cancel Reasons Medical status   Additional Comments OT orders received and chart was reviewed  Spoke to RN who advised to hold @ this time as pt is currently on biPAP and not appropriate for skilled OT services @ this time  Will continue to follow on caseload and see when appropriate       Jolly Vences MS, OTR/L

## 2022-11-21 NOTE — PROGRESS NOTES
Sd Peralta is a 78 y o  male who is currently ordered Vancomycin IV with management by the Pharmacy Consult service  Relevant clinical data and objective / subjective history reviewed  Vancomycin Assessment:  Indication and Goal AUC/Trough: Pneumonia (goal -600, trough >10), 15-20 (appropriate for most indications)  Clinical Status: worsening  Micro:   "Chest x-ray with some concern for pneumonia, also with leukocytosis, slight elevation in pro count" - "Sepsis as evidenced by leukocytosis, tachypnea, secondary to suspected pneumonia"  Blood culture and MRSA swab pending  Renal Function:  SCr: 2 01 mg/dL  CrCl: 36 9 mL/min  Renal replacement: Not on dialysis  Days of Therapy: 1  Current Dose: 2,000 mg LD given  Vancomycin Plan:  New Dosin,000 mg q24h (start before predicted trough drops below 10 mcg/mL:  @1200)  Estimated AUC: 452 mcg*hr/mL  Estimated Trough: 15 mcg/mL  Next Level: random level  @0600  Renal Function Monitoring: Daily BMP and [place] will continue to follow closely for s/sx of nephrotoxicity, infusion reactions and appropriateness of therapy  BMP and CBC will be ordered per protocol  We will continue to follow the patient’s culture results and clinical progress daily      Lizette Fuentes, Pharmacist

## 2022-11-21 NOTE — PHYSICAL THERAPY NOTE
Physical Therapy Cancellation Note       11/21/22 0845   PT Last Visit   PT Visit Date 11/21/22   Note Type   Note type Evaluation; Cancelled Session   Cancel Reasons Medical status       ORDERS FOR PT EVALUATION RECEIVED AND CHART REVIEW COMPLETED  PT NOT APPROPRIATE FOR PARTICIPATION IN PT AT THIS TIME 2* MEDICAL STATUS- CURRENTLY ON BIPAP  WILL CONTINUE TO FOLLOW AND COMPLETE PT EVALUATION AS MEDICAL STATUS DICTATES     Georgia Mckinney, PT

## 2022-11-21 NOTE — ASSESSMENT & PLAN NOTE
· Severe COPD FEV1 33%  · Patient on chronic supplemental 02- 2L NC  · Unlikely acute exacerbation   · Continue Flovent  · Start Xopenex and atrovent Q6

## 2022-11-21 NOTE — ASSESSMENT & PLAN NOTE
· History of CAD with stenting in 2015  · Stent to D1, mid RCA and distal RCA  · Continue GDMT  · ASA  · Coreg  · Statin

## 2022-11-21 NOTE — PROGRESS NOTES
Vancomycin IV Pharmacy-to-Dose Consultation    Sondra Galvez is a 78 y o  male who is currently receiving Vancomycin IV with management by the Pharmacy Consult service  Assessment/Plan:  The patient was reviewed  Renal function is stable and no signs or symptoms of nephrotoxicity and/or infusion reactions were documented in the chart  Based on today’s assessment, continue current vancomycin (day # 2) dosing of 1000 mg IV q24h, with a plan for a random level to be drawn at 0600 on 11/22  We will continue to follow the patient’s culture results and clinical progress daily      Tahir Waldron, Pharmacist

## 2022-11-21 NOTE — ASSESSMENT & PLAN NOTE
· CT imaging of his chest showed a ew near complete occlusion of middle lobe and right lower lobe bronchial branches with atelectasis   · Started Cefepime and Vancomycin  · Trend WBC

## 2022-11-21 NOTE — UTILIZATION REVIEW
Initial Clinical Review    Admission: Date/Time/Statement:   Admission Orders (From admission, onward)     Ordered        11/20/22 1336  INPATIENT ADMISSION  Once                      Orders Placed This Encounter   Procedures   • INPATIENT ADMISSION     Standing Status:   Standing     Number of Occurrences:   1     Order Specific Question:   Level of Care     Answer:   Med Surg [16]     Order Specific Question:   Estimated length of stay     Answer:   More than 2 Midnights     Order Specific Question:   Certification     Answer:   I certify that inpatient services are medically necessary for this patient for a duration of greater than two midnights  See H&P and MD Progress Notes for additional information about the patient's course of treatment  ED Arrival Information     Expected   -    Arrival   11/20/2022 10:39    Acuity   Urgent            Means of arrival   Ambulance    Escorted by   [address]/ICU    Admission type   Emergency            Arrival complaint   sob           Chief Complaint   Patient presents with   • Shortness of Breath     Pt brought in from home after waking with with increased SOB, wears 2L at home; pt tried to wait it out to see if it would get better but it continued; 2g Mag and 1 versed given EMS       Initial Presentation: 78 y o  male with PMH of COPD on 2 L nasal cannula at baseline, chronic diastolic heart failure, iron deficiency anemia, CKD stage IIIB-creatinine baseline around 2, AFib on Eliquis, type 2 diabetes on insulin, adeno carcinoma status post SBRT presents with c/o shortness of breath worse over last 24 hrs and leg swelling, productive cough  In ED, pt noted to be in respiratory distress with increased work of breathing, BiPAP recommended but pt refused  Pt placed on HFNC 50 L initially then weaned down to 6 L  BL LE edema noted and crackles heard at bases  CXR and CT chest reveals pleural effusions, see full report below    Pt started on IV ABX, nitro infusion, given neb tx and IV Lasix    Admit Inpatient ICCU d/t Acute on chronic diastolic heart failure: continue IV Lasix, Na and fluid restriction, monitor IO, daily wts and UOP  Continue IV ABX dt pneumonia concern, obtain CT chest, trend procal, cbc and fever  Wean O2 as able  Continue home meds including ASA, statin and BB  Start accuchecks w/ SSI  Nitro infusion stopped on 11/20 dt borderline hypotensive  Pt later agrees to BIPAP if able to receive med for anxiety, obtain am VBG  Keep NPO, BiPAP, insert Cook, PT/OT eval     Date: 11/21   Day 2:   Overnight the patient's SBP was documented in the 80s on nitro drip and following Lasix x2  He was also found to have met sepsis criteria  At that time nitro drip was discontinued and 100 cc of isolate was ordered  He remained on BIPAP over night  He received 5 mg IM zyprexa last night  He remained sedated and did not respond to questioning  Blood cultures positive for Pseudomonas  Hold further diuresis, continue home meds for CAD, ASA and lipitor  Continue neb txs, BiPAPA qhs and prn, wean O2 as able, trend Cr, continue IV ABX      ED Triage Vitals   Temperature Pulse Respirations Blood Pressure SpO2   11/20/22 1048 11/20/22 1048 11/20/22 1048 11/20/22 1048 11/20/22 1048   98 1 °F (36 7 °C) 102 (!) 26 (!) 187/85 97 %      Temp Source Heart Rate Source Patient Position - Orthostatic VS BP Location FiO2 (%)   11/20/22 1048 11/20/22 1048 11/20/22 1048 11/20/22 1048 11/20/22 1053   Tympanic Monitor Sitting Left arm 50      Pain Score       11/20/22 1048       No Pain          Wt Readings from Last 1 Encounters:   11/21/22 112 kg (247 lb 2 2 oz)     Additional Vital Signs:   Date/Time Temp Pulse Resp BP MAP (mmHg) SpO2 FiO2 (%) Calculated FIO2 (%) - Nasal Cannula O2 Flow Rate (L/min) Nasal Cannula O2 Flow Rate (L/min) O2 Device O2 Interface Device Patient Position - Orthostatic VS   11/21/22 1131 -- 80 -- 120/48 Abnormal  72 -- -- -- -- -- -- -- --   11/21/22 1102 99 5 °F (37 5 °C) 80 18 99/42 Abnormal  59 Abnormal  100 % -- -- -- -- -- -- Lying   11/21/22 1100 -- 80 -- 99/42 Abnormal  59 Abnormal  100 % -- -- -- -- -- -- --   11/21/22 1035 -- 80 -- 96/46 Abnormal  64 Abnormal  100 % -- -- -- -- -- -- --   11/21/22 1000 -- 82 -- 77/44 Abnormal  57 Abnormal  100 % -- -- -- -- -- -- --   11/21/22 0927 -- 96 -- 113/72 88 100 % -- -- -- -- -- -- --   11/21/22 0726 -- -- -- -- -- 100 % -- -- -- -- -- -- --   11/21/22 0719 -- -- -- -- -- 100 % -- -- -- -- -- Face mask --   11/21/22 0700 99 °F (37 2 °C) -- 20 -- -- -- -- -- -- -- -- -- --   11/21/22 0603 -- 78 -- 114/55 91 100 % -- -- -- -- -- -- --   11/21/22 0300 -- -- -- -- -- -- -- -- -- -- BiPAP -- --   11/21/22 0233 -- 74 -- 106/53 67 -- -- -- -- -- -- -- --   11/21/22 0033 -- 82 -- 104/59 70 95 % -- -- -- -- -- -- --   11/21/22 0002 -- 78 -- 108/46 Abnormal  69 99 % -- -- -- -- -- -- --   11/21/22 0000 99 2 °F (37 3 °C) 80 24 Abnormal  -- -- 99 % -- -- -- -- -- -- --   11/20/22 2303 -- 74 -- 97/48 Abnormal  64 Abnormal  100 % -- -- -- -- -- -- --   11/20/22 2219 -- 72 -- 94/47 Abnormal  64 Abnormal  99 % -- -- -- -- -- -- --   11/20/22 2203 -- 76 -- 86/43 Abnormal  61 Abnormal  97 % -- -- -- -- -- -- --   11/20/22 2133 99 3 °F (37 4 °C) 82 24 Abnormal  100/49 Abnormal  64 Abnormal  97 % 40 240 -- 55 L/min High flow nasal cannula -- Lying   11/20/22 2100 -- 94 -- 104/51 74 99 % -- -- -- -- -- -- --   11/20/22 2045 -- 96 24 Abnormal  108/57 76 99 % -- -- -- -- High flow nasal cannula -- Lying   11/20/22 1945 -- 100 24 Abnormal  129/62 89 99 % -- -- -- -- High flow nasal cannula -- Lying   11/20/22 1930 -- 100 24 Abnormal  136/63 90 99 % -- -- -- -- High flow nasal cannula -- Lying   11/20/22 1900 -- 102 24 Abnormal  150/70 100 99 % -- -- -- -- High flow nasal cannula -- Lying   11/20/22 1830 -- 100 26 Abnormal  162/79 107 100 % -- -- 55 L/min -- High flow nasal cannula -- Sitting   11/20/22 1730 -- 102 28 Abnormal  157/69 99 100 % -- 240 -- 55 L/min High flow nasal cannula -- Sitting   11/20/22 1709 -- -- -- -- -- 99 % -- -- -- -- -- HFNC prongs --   11/20/22 1615 -- 98 20 169/75 108 99 % -- -- -- -- None (Room air) -- Sitting   11/20/22 1530 -- 100 18 143/64 92 100 % -- -- 6 L/min -- Nasal cannula -- Sitting   11/20/22 1500 -- 100 22 141/84 108 93 % -- -- -- -- Nasal cannula -- Sitting   11/20/22 1400 -- 98 18 138/65 94 100 % -- -- -- -- None (Room air) -- Sitting   11/20/22 1345 -- 98 24 Abnormal  139/63 91 100 % -- -- 6 L/min -- Nasal cannula -- Sitting   11/20/22 1330 -- 100 18 149/65 93 100 % -- -- -- -- -- -- Sitting   11/20/22 1215 -- 66 18 147/71 102 100 % -- -- -- -- None (Room air) -- Sitting   11/20/22 1145 -- 100 -- 127/57 82 99 % -- -- 50 L/min -- High flow nasal cannula -- Sitting   11/20/22 1130 -- 100 20 120/57 82 99 % -- 220 -- 50 L/min High flow nasal cannula -- Sitting   11/20/22 1107 -- -- -- -- -- -- -- -- 50 L/min -- High flow nasal cannula -- --   11/20/22 1100 -- 102 22 129/58 84 100 % -- -- 50 L/min -- High flow nasal cannula -- Sitting   11/20/22 1053 -- -- -- -- -- 97 % 50 -- 50 L/min -- High flow nasal cannula HFNC prongs --   11/20/22 1048 98 1 °F (36 7 °C) 102 26 Abnormal  187/85 Abnormal  -- 97 % -- 44 -- 6 L/min Nasal cannula -- Sitting     Pertinent Labs/Diagnostic Test Results:  11/20 EKG:  Sinus rhythm with 1st degree A-V block  Right bundle branch block  Abnormal ECG    XR chest portable ICU   Final Result by Holgre Baumann MD (11/21 1014)      Moderate right pleural effusion with associated right lung base opacity  Workstation performed: [id number]         CT chest wo contrast   Final Result by Sonam Jones MD (11/20 7135)      1  Increase in moderate right pleural effusion with new small left effusion  2   New near complete occlusion of middle lobe and right lower lobe bronchial branches with atelectasis        3  New small perihepatic ascites  The study was marked in [facility] for immediate notification  Workstation performed: [id number]         XR chest 1 view portable   Final Result by Ric Eden MD (11/20 2056)      Increased size right pleural effusion                      Workstation performed: [id number]           Results from last 7 days   Lab Units 11/20/22  1102   SARS-COV-2  Negative     Results from last 7 days   Lab Units 11/22/22  0521 11/21/22  0636 11/20/22  1102   WBC Thousand/uL 12 99* 17 90* 17 88*   HEMOGLOBIN g/dL 7 1* 7 5* 8 7*   HEMATOCRIT % 24 8* 26 0* 29 6*   PLATELETS Thousands/uL 132* 153 183   NEUTROS ABS Thousands/µL  --  16 08* 15 98*         Results from last 7 days   Lab Units 11/22/22  0521 11/21/22  0636 11/20/22  2241 11/20/22  1102   SODIUM mmol/L 137 138  --  137   POTASSIUM mmol/L 4 2 4 0  --  4 1   CHLORIDE mmol/L 102 101  --  102   CO2 mmol/L 31 33*  --  29   ANION GAP mmol/L 4 4  --  6   BUN mg/dL 46* 38*  --  31*   CREATININE mg/dL 2 72* 2 32*  --  2 01*   EGFR ml/min/1 73sq m 21 26  --  31   CALCIUM mg/dL 8 3 8 3  --  8 4   MAGNESIUM mg/dL 2 5 2 3  --   --    PHOSPHORUS mg/dL 3 8 4 3* 4 4*  --      Results from last 7 days   Lab Units 11/21/22  0636 11/20/22  1102   AST U/L 9 7   ALT U/L 10* 12   ALK PHOS U/L 69 84   TOTAL PROTEIN g/dL 6 1* 7 3   ALBUMIN g/dL 2 2* 2 7*   TOTAL BILIRUBIN mg/dL 0 42 0 47     Results from last 7 days   Lab Units 11/22/22  0527 11/21/22  2102 11/21/22  1546 11/21/22  1126 11/21/22  0622 11/20/22  2201 11/20/22  1812 11/20/22  1412   POC GLUCOSE mg/dl 274* 238* 215* 216* 219* 302* 466* 377*     Results from last 7 days   Lab Units 11/22/22  0521 11/21/22  0636 11/20/22  1102   GLUCOSE RANDOM mg/dL 258* 257* 375*             BETA-HYDROXYBUTYRATE   Date Value Ref Range Status   10/26/2022 0 1 <0 6 mmol/L Final   06/15/2019 0 2 <0 6 mmol/L Final      Results from last 7 days   Lab Units 11/22/22  0621 11/21/22  1553 11/20/22  1738   PH ART  7 369 7 278* 7 310*   PCO2 ART mm Hg 59 3* 76 2* 67 8*   PO2 ART mm Hg 123 4 128 7 94 1   HCO3 ART mmol/L 33 4* 34 8* 33 4*   BASE EXC ART mmol/L 7 1 6 5 5 8   O2 CONTENT ART mL/dL 11 2* 13 0* 12 6*   O2 HGB, ARTERIAL % 97 2* 95 4 95 5   ABG SOURCE  Radial, Left Radial, Left Radial, Left   NON VENT TYPE HFNC   --   --  HFNC Flow   HFNC FLOW LPM   --   --  55     Results from last 7 days   Lab Units 11/20/22  2329 11/20/22  1606 11/20/22  1102   PH MAYE  7 288* 7 243* 7 252*   PCO2 MAYE mm Hg 67 2* 81 0* 67 4*   PO2 MAYE mm Hg 54 0* 31 5* 57 7*   HCO3 MAYE mmol/L 31 4* 34 2* 29 0   BASE EXC MAYE mmol/L 4 0 5 3 0 8   O2 CONTENT MAYE ml/dL 9 5 6 9 12 0   O2 HGB, VENOUS % 83 3* 52 9* 84 0*     Results from last 7 days   Lab Units 11/20/22  1606 11/20/22  1302 11/20/22  1102   HS TNI 0HR ng/L  --   --  25   HS TNI 2HR ng/L  --  30  --    HSTNI D2 ng/L  --  5  --    HS TNI 4HR ng/L 36  --   --    HSTNI D4 ng/L 11  --   --          Results from last 7 days   Lab Units 11/20/22  1235   PROTIME seconds 14 2   INR  1 08   PTT seconds 26     Results from last 7 days   Lab Units 11/21/22  0636 11/20/22  1102   PROCALCITONIN ng/ml 2 67* 0 26*     Results from last 7 days   Lab Units 11/20/22  2243 11/20/22  1235   LACTIC ACID mmol/L 1 5 2 0     Results from last 7 days   Lab Units 11/20/22  1102   NT-PRO BNP pg/mL 4,432*     Results from last 7 days   Lab Units 11/20/22  1322   CLARITY UA  Extra Turbid   COLOR UA  Light Orange   SPEC GRAV UA  1 009   PH UA  5 0   GLUCOSE UA mg/dl 100 (1/10%)*   KETONES UA mg/dl Negative   BLOOD UA  Large*   PROTEIN UA mg/dl 70 (1+)*   NITRITE UA  Negative   BILIRUBIN UA  Negative   UROBILINOGEN UA (BE) mg/dl <2 0   LEUKOCYTES UA  Large*   WBC UA /hpf Innumerable*   RBC UA /hpf 20-30*   BACTERIA UA /hpf Occasional   EPITHELIAL CELLS WET PREP /hpf None Seen   MUCUS THREADS  Occasional*     Results from last 7 days   Lab Units 11/20/22  1102   INFLUENZA A PCR  Negative   INFLUENZA B PCR  Negative   RSV PCR  Negative     Results from last 7 days   Lab Units 11/20/22  1322 11/20/22  1245 11/20/22  1220   GRAM STAIN RESULT   --  Gram negative rods* Gram negative rods*   URINE CULTURE  60,000-69,000 cfu/ml Pseudomonas aeruginosa*  --   --      ED Treatment:   Medication Administration from 11/20/2022 1039 to 11/20/2022 2121       Date/Time Order Dose Route Action     11/20/2022 1101 EST magnesium sulfate (FOR EMS ONLY) 2 g/50 mL IVPB (premix) 2 g 0 g Does not apply Given to EMS     11/20/2022 1101 EST terbutaline (FOR EMS ONLY) (BRETHINE) injection 1 mg 0 mg Does not apply Given to EMS     11/20/2022 1101 EST midazolam (FOR EMS ONLY) (VERSED) 2 mg/2 mL injection 2 mg 0 mg Does not apply Given to EMS     11/20/2022 1053 EST nitroGLYcerin (TRIDIL) 50 mg in 250 mL infusion (premix) 50 mcg/min Intravenous New Bag     11/20/2022 1104 EST furosemide (LASIX) injection 40 mg 40 mg Intravenous Given     11/20/2022 1300 EST cefepime (MAXIPIME) 2 g/50 mL dextrose IVPB 2,000 mg Intravenous New Bag     11/20/2022 1346 EST vancomycin (VANCOCIN) 2,000 mg in sodium chloride 0 9 % 500 mL IVPB 2,000 mg Intravenous New Bag     11/20/2022 1409 EST ipratropium-albuterol (DUO-NEB) 0 5-2 5 mg/3 mL inhalation solution 3 mL 3 mL Nebulization Given     11/20/2022 1817 EST carvedilol (COREG) tablet 6 25 mg 6 25 mg Oral Given     11/20/2022 1816 EST gabapentin (NEURONTIN) capsule 100 mg 100 mg Oral Given     11/20/2022 1816 EST pantoprazole (PROTONIX) EC tablet 40 mg 40 mg Oral Given     11/20/2022 1817 EST pravastatin (PRAVACHOL) tablet 80 mg 80 mg Oral Given     11/20/2022 1816 EST tamsulosin (FLOMAX) capsule 0 4 mg 0 4 mg Oral Given     11/20/2022 2109 EST umeclidinium-vilanterol 62 5-25 mcg/actuation inhaler 1 puff 1 puff Inhalation Given     11/20/2022 1822 EST fluticasone (FLOVENT HFA) 110 MCG/ACT inhaler 1 puff 1 puff Inhalation Given     11/20/2022 1825 EST furosemide (LASIX) injection 40 mg 40 mg Intravenous Given     11/20/2022 1830 EST insulin lispro (HumaLOG) 100 units/mL subcutaneous injection 1-5 Units 5 Units Subcutaneous Given     11/20/2022 1832 EST insulin regular (HumuLIN R,NovoLIN R) injection 10 Units 10 Units Subcutaneous Given     11/20/2022 1818 EST isosorbide mononitrate (IMDUR) 24 hr tablet 30 mg 30 mg Oral Given     11/20/2022 1819 EST nitroGLYcerin (TRIDIL) 50 mg in 250 mL infusion (premix) 10 mcg/min Intravenous New Bag        Past Medical History:   Diagnosis Date   • Abdominal pain    • MARANDA (acute kidney injury) ([facility] ) 6/19/2018   • Cardiac disease    • CHF (congestive heart failure) ([facility])    • COPD, severe ([facility])    • Coronary artery disease    • DDD (degenerative disc disease), lumbar 9/1/2020   • Diabetes mellitus ([facility])    • Dyspnea    • GERD (gastroesophageal reflux disease)    • Hyperlipidemia    • Hypertension    • MI (myocardial infarction) ([place] [address] )     with 3 stents   • Nodule of apex of right lung    • JACQUELINE (obstructive sleep apnea)    • Prostate cancer ([place] [address] )      Present on Admission:  • COPD (chronic obstructive pulmonary disease) ([facility])  • HLD (hyperlipidemia)  • Essential hypertension  • CAD (coronary artery disease)  • Acute on chronic respiratory failure with hypoxia and hypercapnia ([facility])  • Adenocarcinoma of lung ([facility])  • Stage 3b chronic kidney disease (CKD) ([facility])  • A-fib ([facility])  • Pleural effusion on right      Admitting Diagnosis: Pneumonia [J18 9]  Acute exacerbation of CHF (congestive heart failure) ([facility]) [I50 9]  Type 2 diabetes mellitus with hyperglycemia, with long-term current use of insulin ([facility]) [E11 65, Z79 4]  Age/Sex: 78 y o  male  Admission Orders:  Scheduled Medications:  apixaban, 5 mg, Oral, BID  aspirin, 81 mg, Oral, Daily  budesonide, 0 5 mg, Nebulization, Q12H  carvedilol, 6 25 mg, Oral, BID With Meals  cefepime, 2,000 mg, Intravenous, Q12H  fluticasone, 1 puff, Inhalation, Q12H DRAKE  formoterol, 20 mcg, Nebulization, Q12H  gabapentin, 100 mg, Oral, TID  insulin glargine, 40 Units, Subcutaneous, HS  insulin lispro, 1-6 Units, Subcutaneous, TID AC  insulin lispro, 1-6 Units, Subcutaneous, HS  insulin lispro, 5 Units, Subcutaneous, Daily With Breakfast  insulin lispro, 5 Units, Subcutaneous, Daily With Lunch  insulin lispro, 5 Units, Subcutaneous, Daily With Dinner  ipratropium, 0 5 mg, Nebulization, Q6H  isosorbide mononitrate, 30 mg, Oral, Daily  levalbuterol, 1 25 mg, Nebulization, Q6H  pantoprazole, 40 mg, Oral, BID AC  pravastatin, 80 mg, Oral, Daily With Dinner  senna-docusate sodium, 1 tablet, Oral, HS  tamsulosin, 0 4 mg, Oral, Daily With Dinner      Continuous IV Infusions:   nitroGLYcerin (TRIDIL) 50 mg in 250 mL infusion (premix)  Rate: 1 5-60 mL/hr Dose: 5-200 mcg/min  Freq: Titrated Route: IV  Last Dose: Stopped (11/20/22 2222)  Start: 11/20/22 1730 End: 11/20/22 2218  dexmedetomidine, 0 1-0 7 mcg/kg/hr, Intravenous, Titrated      PRN Meds:  OLANZapine, 5 mg, Oral, HS PRN X1 thus far  ondansetron, 4 mg, Intravenous, Q6H PRN    scd      IP CONSULT TO CASE MANAGEMENT  IP CONSULT TO PHARMACY    Network Utilization Review Department  ATTENTION: Please call with any questions or concerns to [phone] and carefully listen to the prompts so that you are directed to the right person  All voicemails are confidential   Kathryn Solomon all requests for admission clinical reviews, approved or denied determinations and any other requests to dedicated fax number below belonging to the campus where the patient is receiving treatment   List of dedicated fax numbers for the Facilities:  [address] DENIALS (Administrative/Medical Necessity) [phone]   [address] [place] [address] (Maternity/NICU/Pediatrics) [phone]   916 Annabelle Roth [address] Chavez  [phone]   [address] [phone] [address] [facility] [place] [address] [phone]

## 2022-11-21 NOTE — ED ATTENDING ATTESTATION
11/20/2022   IJaime MD, saw and evaluated the patient  I have discussed the patient with the resident/non-physician practitioner and agree with the resident's/non-physician practitioner's findings, Plan of Care, and MDM as documented in the resident's/non-physician practitioner's note, except where noted  All available labs and Radiology studies were reviewed  I was present for key portions of any procedure(s) performed by the resident/non-physician practitioner and I was immediately available to provide assistance  At this point I agree with the current assessment done in the Emergency Department  I have conducted an independent evaluation of this patient a history and physical is as follows:    Chief Complaint   Patient presents with   • Shortness of Breath     Pt brought in from home after waking with with increased SOB, wears 2L at home; pt tried to wait it out to see if it would get better but it continued; 2g Mag and 1 versed given EMS     78 y o  male presenting with respiratory distress  Patient has a history of DM, Afib on eliquis, CHF, severe COPD on baseline 2 L O2 via NC presenting with difficulty breathing  Patient reports symptoms started yesterday  He was in significant distress to EMS and was started on CPAP and patient now reports that his breathing is improved  He reports coughing more than usual, but denies chest pain, fevers, nausea, vomiting, abdominal pain, or diarrhea  He was recently hospitalized with similar symptoms  Physical Exam  BP (!) 120/48   Pulse 80   Temp 99 5 °F (37 5 °C) (Axillary)   Resp 18   Ht 6' (1 829 m)   Wt 112 kg (247 lb 2 2 oz)   SpO2 100%   BMI 33 52 kg/m²      Vital signs and nursing notes reviewed    CONSTITUTIONAL: male appearing stated age sitting up on the stretcher, tachypneic and in respiratory distress  HEENT: atraumatic, normocephalic  Sclera anicteric, conjunctiva are not injected   Moist oral mucosa  CARDIOVASCULAR/CHEST: RRR, no M/R/G  2+ radial pulses  PULMONARY: Respiratory distress, tachypneic, with accessory muscle use  Air entry is diffusely diminished with diffuse rales posteriorly  ABDOMEN: non-distended  BS present, normoactive  Non-tender  MSK: moves all extremities, no deformities, 2+ pitting BLE edema, no calf asymmetry  NEURO: Awake, alert, and oriented x 3  Face symmetric  Moves all extremities spontaneously  No focal neurologic deficits  SKIN: Warm, appears well-perfused  MENTAL STATUS: Normal affect      Labs and Imaging  Labs Reviewed   CBC AND DIFFERENTIAL - Abnormal       Result Value Ref Range Status    WBC 17 88 (*) 4 31 - 10 16 Thousand/uL Final    RBC 3 40 (*) 3 88 - 5 62 Million/uL Final    Hemoglobin 8 7 (*) 12 0 - 17 0 g/dL Final    Hematocrit 29 6 (*) 36 5 - 49 3 % Final    MCV 87  82 - 98 fL Final    MCH 25 6 (*) 26 8 - 34 3 pg Final    MCHC 29 4 (*) 31 4 - 37 4 g/dL Final    RDW 15 1  11 6 - 15 1 % Final    MPV 10 4  8 9 - 12 7 fL Final    Platelets 123  526 - 390 Thousands/uL Final    nRBC 0  /100 WBCs Final    Comment: This is an appended report  These results have been appended to a previously preliminary verified report  Neutrophils Relative 90 (*) 43 - 75 % Final    Immat GRANS % 0  0 - 2 % Final    Lymphocytes Relative 2 (*) 14 - 44 % Final    Monocytes Relative 8  4 - 12 % Final    Eosinophils Relative 0  0 - 6 % Final    Basophils Relative 0  0 - 1 % Final    Neutrophils Absolute 15 98 (*) 1 85 - 7 62 Thousands/µL Final    Immature Grans Absolute 0 08  0 00 - 0 20 Thousand/uL Final    Lymphocytes Absolute 0 43 (*) 0 60 - 4 47 Thousands/µL Final    Monocytes Absolute 1 37 (*) 0 17 - 1 22 Thousand/µL Final    Eosinophils Absolute 0 00  0 00 - 0 61 Thousand/µL Final    Basophils Absolute 0 02  0 00 - 0 10 Thousands/µL Final    Narrative: This is an appended report  These results have been appended to a previously verified report     NT-BNP PRO (BRAIN NATRIURETIC PEPTIDE) - Abnormal    NT-proBNP 4,432 (*) <450 pg/mL Final   BLOOD GAS, VENOUS - Abnormal    pH, Dwain 7 252 (*) 7 300 - 7 400 Final    pCO2, Dwain 67 4 (*) 42 0 - 50 0 mm Hg Final    pO2, Dwain 57 7 (*) 35 0 - 45 0 mm Hg Final    HCO3, Dwain 29 0  24 - 30 mmol/L Final    Base Excess, Dwain 0 8  mmol/L Final    O2 Content, Dwain 12 0  ml/dL Final    O2 HGB, VENOUS 84 0 (*) 60 0 - 80 0 % Final   COMPREHENSIVE METABOLIC PANEL - Abnormal    Sodium 137  135 - 147 mmol/L Final    Potassium 4 1  3 5 - 5 3 mmol/L Final    Chloride 102  96 - 108 mmol/L Final    CO2 29  21 - 32 mmol/L Final    ANION GAP 6  4 - 13 mmol/L Final    BUN 31 (*) 5 - 25 mg/dL Final    Creatinine 2 01 (*) 0 60 - 1 30 mg/dL Final    Comment: Standardized to IDMS reference method    Glucose 375 (*) 65 - 140 mg/dL Final    Comment: If the patient is fasting, the ADA then defines impaired fasting glucose as > 100 mg/dL and diabetes as > or equal to 123 mg/dL  Specimen collection should occur prior to Sulfasalazine administration due to the potential for falsely depressed results  Specimen collection should occur prior to Sulfapyridine administration due to the potential for falsely elevated results  Calcium 8 4  8 3 - 10 1 mg/dL Final    Corrected Calcium 9 4  8 3 - 10 1 mg/dL Final    AST 7  5 - 45 U/L Final    Comment: Specimen collection should occur prior to Sulfasalazine administration due to the potential for falsely depressed results  ALT 12  12 - 78 U/L Final    Comment: Specimen collection should occur prior to Sulfasalazine and/or Sulfapyridine administration due to the potential for falsely depressed results  Alkaline Phosphatase 84  46 - 116 U/L Final    Total Protein 7 3  6 4 - 8 4 g/dL Final    Albumin 2 7 (*) 3 5 - 5 0 g/dL Final    Total Bilirubin 0 47  0 20 - 1 00 mg/dL Final    Comment: Use of this assay is not recommended for patients undergoing treatment with eltrombopag due to the potential for falsely elevated results      eGFR 31  ml/min/1 73sq m Final Narrative:     National Kid[employer] guidelines for Chronic Kidney Disease (CKD):   •  Stage 1 with normal or high GFR (GFR > 90 mL/min/1 73 square meters)  •  Stage 2 Mild CKD (GFR = 60-89 mL/min/1 73 square meters)  •  Stage 3A Moderate CKD (GFR = 45-59 mL/min/1 73 square meters)  •  Stage 3B Moderate CKD (GFR = 30-44 mL/min/1 73 square meters)  •  Stage 4 Severe CKD (GFR = 15-29 mL/min/1 73 square meters)  •  Stage 5 End Stage CKD (GFR <15 mL/min/1 73 square meters)  Note: GFR calculation is accurate only with a steady state creatinine   PROCALCITONIN TEST - Abnormal    Procalcitonin 0 26 (*) <=0 25 ng/ml Final    Comment: Suspected Lower Respiratory Tract Infection (LRTI):  - LESS than or EQUAL to 0 25 ng/mL:   low likelihood for bacterial LRTI; antibiotics DISCOURAGED   - GREATER than 0 25 ng/mL:   increased likelihood for bacterial LRTI; antibiotics ENCOURAGED  Suspected Sepsis:  - Strongly consider initiating antibiotics in ALL UNSTABLE patients  - LESS than or EQUAL to 0 5 ng/mL:   low likelihood for bacterial sepsis; antibiotics DISCOURAGED   - GREATER than 0 5 ng/mL:   increased likelihood for bacterial sepsis; antibiotics ENCOURAGED   - GREATER than 2 ng/mL:   high risk for severe sepsis / septic shock; antibiotics strongly ENCOURAGED  Decisions on antibiotic use should not be based solely on Procalcitonin (PCT) levels  If PCT is low but uncertainty exists with stopping antibiotics, repeat PCT in 6-24 hours to confirm the low level  If antibiotics are administered (regardless if initial PCT was high or low), repeat PCT every 1-2 days to consider early antibiotic cessation (when GREATER than 80% decrease from the peak OR when PCT drops below designated cutoffs, whichever comes first), so long as the infection is NOT one that typically requires prolonged treatment durations (e g , bone/joint infections, endocarditis, Staph  aureus bacteremia)      Situations of FALSE-POSITIVE Procalcitonin values:  1) Newborns < 67 hours old  2) Massive stress from severe trauma / burns, major surgery, acute pancreatitis, cardiogenic / hemorrhagic shock, sickle cell crisis, or other organ perfusion abnormalities  3) Malaria and some Candidal infections  4) Treatment with agents that stimulate cytokines (e g , OKT3, anti-lymphocyte globulins, alemtuzumab, IL-2, granulocyte transfusion [NOT GCSFs])  5) Chronic renal disease causes elevated baseline levels (consider GREATER than 0 75 ng/mL as an abnormal cut-off); initiating HD/CRRT may cause transient decreases  6) Paraneoplastic syndromes from medullary thyroid or SCLC, some forms of vasculitis, and acute [id number] disease    Situations of FALSE-NEGATIVE Procalcitonin values:  1) Too early in clinical course for PCT to have reached its peak (may repeat in 6-24 hours to confirm low level)  2) Localized infection WITHOUT systemic (SIRS / sepsis) response (e g , an abscess, osteomyelitis, cystitis)  3) Mycobacteria (e g , Tuberculosis, MAC)  4) Cystic fibrosis exacerbations     UA W REFLEX TO MICROSCOPIC WITH REFLEX TO CULTURE - Abnormal    Color, UA Light Orange   Final    Clarity, UA Extra Turbid   Final    Specific Gravity, UA 1 009  1 003 - 1 030 Final    pH, UA 5 0  4 5, 5 0, 5 5, 6 0, 6 5, 7 0, 7 5, 8 0 Final    Leukocytes, UA Large (*) Negative Final    Nitrite, UA Negative  Negative Final    Protein, UA 70 (1+) (*) Negative mg/dl Final    Glucose,  (1/10%) (*) Negative mg/dl Final    Ketones, UA Negative  Negative mg/dl Final    Urobilinogen, UA <2 0  <2 0 mg/dl mg/dl Final    Bilirubin, UA Negative  Negative Final    Occult Blood, UA Large (*) Negative Final   URINE MICROSCOPIC - Abnormal    RBC, UA 20-30 (*) None Seen, 1-2 /hpf Final    WBC, UA Innumerable (*) None Seen, 1-2 /hpf Final    Epithelial Cells None Seen  None Seen, Occasional /hpf Final    Bacteria, UA Occasional  None Seen, Occasional /hpf Final    MUCUS THREADS Occasional (*) None Seen Final    WBC Clumps Present   Final   BLOOD GAS, VENOUS - Abnormal    pH, Dwain 7 243 (*) 7 300 - 7 400 Final    pCO2, Dwain 81 0 (*) 42 0 - 50 0 mm Hg Final    pO2, Dwain 31 5 (*) 35 0 - 45 0 mm Hg Final    HCO3, Dwain 34 2 (*) 24 - 30 mmol/L Final    Base Excess, Dwain 5 3  mmol/L Final    O2 Content, Dwain 6 9  ml/dL Final    O2 HGB, VENOUS 52 9 (*) 60 0 - 80 0 % Final   BLOOD GAS, ARTERIAL - Abnormal    pH, Arterial 7 310 (*) 7 350 - 7 450 Final    pCO2, Arterial 67 8 (*) 36 0 - 44 0 mm Hg Final    pO2, Arterial 94 1  75 0 - 129 0 mm Hg Final    HCO3, Arterial 33 4 (*) 22 0 - 28 0 mmol/L Final    Base Excess, Arterial 5 8  mmol/L Final    O2 Content, Arterial 12 6 (*) 16 0 - 23 0 mL/dL Final    O2 HGB,Arterial  95 5  94 0 - 97 0 % Final    SOURCE Radial, Left   Final    KRISTIAN TEST Yes   Final    Non Vent Type- HFNC HFNC Flow   Final    NV HFNC FIO2 40   Final    HFNC FLOW LPM 55   Final   POCT GLUCOSE - Abnormal    POC Glucose 377 (*) 65 - 140 mg/dl Final   POCT GLUCOSE - Abnormal    POC Glucose 466 (*) 65 - 140 mg/dl Final    Comment: CRITICAL VALUE NOTED REPEAT   COVID19, INFLUENZA A/B, RSV PCR, [facility] - Normal    SARS-CoV-2 Negative  Negative Final    Comment:      INFLUENZA A PCR Negative  Negative Final    Comment:      INFLUENZA B PCR Negative  Negative Final    Comment:      RSV PCR Negative  Negative Final    Comment:      Narrative:     FOR PEDIATRIC PATIENTS - copy/paste COVID Guidelines URL to browser: [email or web address]    SARS-CoV-2 assay is a Nucleic Acid Amplification assay intended for the  qualitative detection of nucleic acid from SARS-CoV-2 in nasopharyngeal  swabs  Results are for the presumptive identification of SARS-CoV-2 RNA  Positive results are indicative of infection with SARS-CoV-2, the virus  causing COVID-19, but do not rule out bacterial infection or co-infection  with other viruses   Laboratories within the [place] and its  territories are required to report all positive results to the appropriate  public health authorities  Negative results do not preclude SARS-CoV-2  infection and should not be used as the sole basis for treatment or other  patient management decisions  Negative results must be combined with  clinical observations, patient history, and epidemiological information  This test has not been FDA cleared or approved  This test has been authorized by FDA under an Emergency Use Authorization  (EUA)  This test is only authorized for the duration of time the  declaration that circumstances exist justifying the authorization of the  emergency use of an in vitro diagnostic tests for detection of SARS-CoV-2  virus and/or diagnosis of COVID-19 infection under section 564(b)(1) of  the Act, 21 U  S C  285TQI-4(L)(6), unless the authorization is terminated  or revoked sooner  The test has been validated but independent review by FDA  and CLIA is pending  Test performed using [employer] GeneXpert: This RT-PCR assay targets N2,  a region unique to SARS-CoV-2  A conserved region in the E-gene was chosen  for pan-Sarbecovirus detection which includes SARS-CoV-2  According to CMS-2020-01-R, this platform meets the definition of high-throughput technology  HS TROPONIN I 0HR - Normal    hs TnI 0hr 25  "Refer to ACS Flowchart"- see link ng/L Final    Comment:                                              Initial (time 0) result  If >=50 ng/L, Myocardial injury suggested ;  Type of myocardial injury and treatment strategy  to be determined  If 5-49 ng/L, a delta result at 2 hours and or 4 hours will be needed to further evaluate  If <4 ng/L, and chest pain has been >3 hours since onset, patient may qualify for discharge based on the HEART score in the ED  If <5 ng/L and <3hours since onset of chest pain, a delta result at 2 hours will be needed to further evaluate      HS Troponin 99th Percentile URL of a Health Population=12 ng/L with a 95% Confidence Interval of 8-18 ng/L  Second Troponin (time 2 hours)  If calculated delta >= 20 ng/L,  Myocardial injury suggested ; Type of myocardial injury and treatment strategy to be determined  If 5-49 ng/L and the calculated delta is 5-19 ng/L, consult medical service for evaluation  Continue evaluation for ischemia on ecg and other possible etiology and repeat hs troponin at 4 hours  If delta is <5 ng/L at 2 hours, consider discharge based on risk stratification via the HEART score (if in ED), or MIKKI risk score in IP/Observation  HS Troponin 99th Percentile URL of a Health Population=12 ng/L with a 95% Confidence Interval of 8-18 ng/L    HS TROPONIN I 2HR - Normal    hs TnI 2hr 30  "Refer to ACS Flowchart"- see link ng/L Final    Comment:                                              Initial (time 0) result  If >=50 ng/L, Myocardial injury suggested ;  Type of myocardial injury and treatment strategy  to be determined  If 5-49 ng/L, a delta result at 2 hours and or 4 hours will be needed to further evaluate  If <4 ng/L, and chest pain has been >3 hours since onset, patient may qualify for discharge based on the HEART score in the ED  If <5 ng/L and <3hours since onset of chest pain, a delta result at 2 hours will be needed to further evaluate  HS Troponin 99th Percentile URL of a Health Population=12 ng/L with a 95% Confidence Interval of 8-18 ng/L  Second Troponin (time 2 hours)  If calculated delta >= 20 ng/L,  Myocardial injury suggested ; Type of myocardial injury and treatment strategy to be determined  If 5-49 ng/L and the calculated delta is 5-19 ng/L, consult medical service for evaluation  Continue evaluation for ischemia on ecg and other possible etiology and repeat hs troponin at 4 hours  If delta is <5 ng/L at 2 hours, consider discharge based on risk stratification via the HEART score (if in ED), or MIKKI risk score in IP/Observation      HS Troponin 99th Percentile URL of a Health Population=12 ng/L with a 95% Confidence Interval of 8-18 ng/L  Delta 2hr hsTnI 5  <20 ng/L Final   HS TROPONIN I 4HR - Normal    hs TnI 4hr 36  "Refer to ACS Flowchart"- see link ng/L Final    Comment:                                              Initial (time 0) result  If >=50 ng/L, Myocardial injury suggested ;  Type of myocardial injury and treatment strategy  to be determined  If 5-49 ng/L, a delta result at 2 hours and or 4 hours will be needed to further evaluate  If <4 ng/L, and chest pain has been >3 hours since onset, patient may qualify for discharge based on the HEART score in the ED  If <5 ng/L and <3hours since onset of chest pain, a delta result at 2 hours will be needed to further evaluate  HS Troponin 99th Percentile URL of a Health Population=12 ng/L with a 95% Confidence Interval of 8-18 ng/L  Second Troponin (time 2 hours)  If calculated delta >= 20 ng/L,  Myocardial injury suggested ; Type of myocardial injury and treatment strategy to be determined  If 5-49 ng/L and the calculated delta is 5-19 ng/L, consult medical service for evaluation  Continue evaluation for ischemia on ecg and other possible etiology and repeat hs troponin at 4 hours  If delta is <5 ng/L at 2 hours, consider discharge based on risk stratification via the HEART score (if in ED), or MIKKI risk score in IP/Observation  HS Troponin 99th Percentile URL of a Health Population=12 ng/L with a 95% Confidence Interval of 8-18 ng/L  Delta 4hr hsTnI 11  <20 ng/L Final   LACTIC ACID, PLASMA - Normal    LACTIC ACID 2 0  0 5 - 2 0 mmol/L Final    Narrative:     Result may be elevated if tourniquet was used during collection     PROTIME-INR - Normal    Protime 14 2  11 6 - 14 5 seconds Final    INR 1 08  0 84 - 1 19 Final   APTT - Normal    PTT 26  23 - 37 seconds Final    Comment: Therapeutic Heparin Range =  60-90 seconds   MRSA CULTURE       CT chest wo contrast   Final Result 1   Increase in moderate right pleural effusion with new small left effusion  2   New near complete occlusion of middle lobe and right lower lobe bronchial branches with atelectasis  3  New small perihepatic ascites  The study was marked in [facility] for immediate notification  Workstation performed: [id number]         XR chest 1 view portable   Final Result      Increased size right pleural effusion    Workstation performed: [id number]               Procedures  ECG 12 Lead Documentation Only    Date/Time: 11/20/2022 10:50 AM  Performed by: Audie Pompa MD  Authorized by: Audie Pompa MD     Comments:      Sinus tachycardia, , SD prolonged and consistent with 1st degree AV block,  consistent with RBBB, QTc 487, right axis deviation, non-specific ST segment abnormalities in lateral precordial leads, no STEMI, No significant changes compared to prior EKG dated 11/7/2022  ED Course  Medications   magnesium sulfate (FOR EMS ONLY) 2 g/50 mL IVPB (premix) 2 g (0 g Does not apply Given to EMS 11/20/22 1101)   terbutaline (FOR EMS ONLY) (BRETHINE) injection 1 mg (0 mg Does not apply Given to EMS 11/20/22 1101)   midazolam (FOR EMS ONLY) (VERSED) 2 mg/2 mL injection 2 mg (0 mg Does not apply Given to EMS 11/20/22 1101)   furosemide (LASIX) injection 40 mg (40 mg Intravenous Given 11/20/22 1104)   cefepime (MAXIPIME) 2 g/50 mL dextrose IVPB (0 mg Intravenous Stopped 11/20/22 1330)   vancomycin (VANCOCIN) 2,000 mg in sodium chloride 0 9 % 500 mL IVPB (0 mg Intravenous Stopped 11/20/22 1612)        78 y o  male with complex PMH presenting with respiratory distress  VS reviewed, tachypneic, afebrile, not hypotensive, mildly tachy  DDx includes CHF exacerbation, COPD exacerbation, viral respiratory illness, PNA, PNX, PE, versus another etiology  Patient declines BiPAP, is satting above 90% on 6L O2 at present  EKG as above, no significant change from prior   VBG pH is essentially WNL  BNP elevated  CXR with pleural effusion, unable to rule out underlying infiltrate  Procal and WBC are elevated  Given recent hospitalization, will cover with broad spectrum antibiotics for possible hospital acquired pneumonia  Patient admitted to the hospital for further treatment

## 2022-11-21 NOTE — PROGRESS NOTES
Daily Progress Note - Critical Care   Palmer London  78 y o  male MRN: [id number]  Unit/Bed#: [facility] 201-01 Encounter: [id number]        ----------------------------------------------------------------------------------------  HPI/24hr events: Patient is a 42-year-old male with a past medical history of severe COPD, diastolic CHF, atrial fibrillation on Eliquis, lung cancer status post SBRT, type 2 diabetes, stage 3b CKD, GERD, CAD and hypertension who presented to the emergency department 11/20 complaining of worsening shortness of breath  His worsening hypoxic respiratory failure was thought to be secondary to acute CHF exacerbation versus COPD exacerbation versus possible underlying pneumonia  He was placed on high-flow nasal cannula in the emergency department as well as a nitro drip for elevated blood pressure and Lasix  The patient met SIRS criteria and was started on antibiotics and blood cultures were drawn  He was also found to have a new near complete occlusion of the middle lobe on the right lower lobe bronchial branches with atelectasis on CT chest   He remained on high-flow nasal cannula and so he was brought to the [facility] for further monitoring  Overnight the patient's SBP was documented in the 80s on nitro drip and following Lasix x2  He was also found to have met sepsis criteria  At that time nitro drip was discontinued and 100 cc of isolate was ordered  ---------------------------------------------------------------------------------------  SUBJECTIVE  Patient was seen and examined at bedside  He remained on BIPAP over night  He received 5 mg IM zyprexa last night  He remained sedated and did not respond to questioning       Review of Systems  -Unable to be obtained 2/2 sedation/BIPAP  ---------------------------------------------------------------------------------------  Assessment and Plan:    Neuro:   • Diagnosis:  No active issues  o Continue delirium precautions      CV: • Diagnosis:  AFib  o Plan:  Currently NSR  o Continue home Eliquis and carvedilol  o Continue telemetry monitoring  • Diagnosis:  CAD  o Plan:  Status post stent placement in 2015  o Stress test negative in May of this year  o Continue aspirin, statin, beta-blocker  • Diagnosis:  HTN  o Plan:  Monitor blood pressure closely  o Continue carvedilol and Lasix  • Diagnosis:  HLD  o Plan:  Continue statin  • Diagnosis:  Acute on chronic diastolic heart failure  o Plan:  Continue intermittent diuresis with furosemide as needed (home dose 40 mg po daily)  o 25g albumin ordered  o Continue fluid restriction and sodium restriction  o Monitor I's and O's and daily weights      Pulm:  • Diagnosis:  Pneumonia  o Plan: CXR showed some concern for pneumonia  o CT chest showed increase in right pleural effusion with new small left effusion, new near complete occlusion of right middle lobe and right lower lobe bronchial branches with atelectasis and new small perihepatic ascites  o Follow-up blood culture, MRSA culture and urine culture  - Blood and urine culture positive for Pseudomonas  - Will continue cefepime and discontinue vancomycin  o Follow-up a m   Procalcitonin  o Monitor WBC count and fever curve  • Diagnosis:  Lung adenocarcinoma  o Plan: s/p resection and radiation therapy in the past  • Diagnosis:  COPD  o Plan:  History of COPD on 2 L NC at baseline  o Continue nebulizers with ipratropium and leave albuterol  - Will add Perforomist and budesonide b i d   o Continue to wean supplemental oxygen as able  • Diagnosis:  Acute on chronic respiratory failure with hypoxia and hypercapnia  o Plan:  Likely secondary to pneumonia versus CHF exacerbation versus COPD exacerbation  o BL 2 L NC  o Continue plan for pneumonia as above  o Afternoon ABG showing hypercapnia with a pCO2 of 76 2  o Continue BIPAP and wean as able  o Continue intermittent diuresis as needed  o Monitor I's and O's and daily weights  o Wean supplemental oxygen as able  o Monitor respiratory status closely      GI:   • Diagnosis:  No active issues      :   • Diagnosis:  Stage IIIB CKD  o Plan:  Baseline creatinine appears to be from 1 8-2  o On admission creatinine was at baseline at 2 01  o Continue intermittent diuresis  o Monitor BMP closely  o Avoid nephrotoxic medications      F/E/N:   • Plan:  Monitor daily BMP      Heme/Onc:   • Diagnosis:  Lung adenocarcinoma  o Plan: s/p resection and radiation therapy in the past      Endo:   • Diagnosis:  Type 2 diabetes mellitus  o Plan:  Home insulin regimen includes 30 units of long-acting at bedtime and 10 units short-acting with meals  o Will continue 40 units Lantus at bedtime and sliding scale insulin for now  o Monitor blood sugars closely      ID:   • Diagnosis:  Sepsis  o Plan:  Patient presented with leukocytosis and tachypnea  o Sources likely secondary to pneumonia vs UTI  - Blood and urine cultures growing Pseudomonas  o Continue IV antibiotics with cefepime  o Monitor WBC count and fever curve  o Follow-up culture sensitivities      MSK/Skin:   o Diagnosis:  PT/OT consulted  - OOB when able  - Frequent turning and repositioning    Patient appropriate for transfer out of the ICU today?: No  Disposition: Continue Stepdown Level 1 level of care   Code Status: Level 1 - Full Code  ---------------------------------------------------------------------------------------  ICU CORE MEASURES    Prophylaxis   VTE Pharmacologic Prophylaxis: Apixaban (Eliquis)  VTE Mechanical Prophylaxis: sequential compression device  Stress Ulcer Prophylaxis: Pantoprazole PO    ABCDE Protocol (if indicated)  Plan to perform spontaneous awakening trial today? Not applicable  Plan to perform spontaneous breathing trial today? Not applicable  Obvious barriers to extubation?  Not applicable  CAM-ICU: Negative    Invasive Devices Review  Invasive Devices     Peripheral Intravenous Line  Duration           Peripheral IV 11/20/22 Right Antecubital 1 day    Peripheral IV 22 Dorsal (posterior); Right Hand <1 day          Drain  Duration           Urethral Catheter Straight-tip 18 Fr  1 day              Can any invasive devices be discontinued today? Not applicable  ---------------------------------------------------------------------------------------  OBJECTIVE    Vitals   Vitals:    22 1356 22 1506 22 1535 22 1536   BP:  97/52 (!) 80/67 106/58   BP Location:  Right arm     Pulse:  86 98 96   Resp:  18     Temp:  98 9 °F (37 2 °C)     TempSrc:  Oral     SpO2: 100% 100% 96% 99%   Weight:       Height:         Temp (24hrs), Av 2 °F (37 3 °C), Min:98 9 °F (37 2 °C), Max:99 5 °F (37 5 °C)  Current: Temperature: 98 9 °F (37 2 °C)      Respiratory:  SpO2: SpO2: 99 %  Nasal Cannula O2 Flow Rate (L/min): 55 L/min    Invasive/non-invasive ventilation settings   Respiratory    Lab Data (Last 4 hours)       1553            pH, Arterial       7 278             pCO2, Arterial       76 2             pO2, Arterial       128 7             HCO3, Arterial       34 8             Base Excess, Arterial       6 5                  O2/Vent Data     None                Physical Exam  Constitutional:       Appearance: He is well-developed and well-nourished  He is obese  HENT:      Head: Normocephalic and atraumatic  Nose: Nose normal       Mouth/Throat:      Mouth: Oropharynx is clear and moist    Eyes:      General:         Right eye: No discharge  Left eye: No discharge  Conjunctiva/sclera: Conjunctivae normal       Pupils: Pupils are equal, round, and reactive to light  Neck:      Thyroid: No thyromegaly  Cardiovascular:      Rate and Rhythm: Normal rate and regular rhythm  Heart sounds: Normal heart sounds  No murmur heard  No friction rub  No gallop  Pulmonary:      Effort: No respiratory distress  Breath sounds: Normal breath sounds  No stridor  No wheezing or rales        Comments: BiPAP mask in place  Chest:      Chest wall: No tenderness  Abdominal:      General: Bowel sounds are normal  There is no distension  Palpations: Abdomen is soft  There is no mass  Tenderness: There is no abdominal tenderness  There is no guarding or rebound  Musculoskeletal:         General: No tenderness or deformity  Right lower leg: Edema present  Left lower leg: Edema present  Lymphadenopathy:      Cervical: No cervical adenopathy  Skin:     General: Skin is warm and dry  Neurological:      Mental Status: He is alert     Psychiatric:         Mood and Affect: Mood and affect normal              Laboratory and Diagnostics:  Results from last 7 days   Lab Units 11/21/22  0636 11/20/22  1102   WBC Thousand/uL 17 90* 17 88*   HEMOGLOBIN g/dL 7 5* 8 7*   HEMATOCRIT % 26 0* 29 6*   PLATELETS Thousands/uL 153 183   NEUTROS PCT % 89* 90*   MONOS PCT % 7 8     Results from last 7 days   Lab Units 11/21/22  0636 11/20/22  1102   SODIUM mmol/L 138 137   POTASSIUM mmol/L 4 0 4 1   CHLORIDE mmol/L 101 102   CO2 mmol/L 33* 29   ANION GAP mmol/L 4 6   BUN mg/dL 38* 31*   CREATININE mg/dL 2 32* 2 01*   CALCIUM mg/dL 8 3 8 4   GLUCOSE RANDOM mg/dL 257* 375*   ALT U/L 10* 12   AST U/L 9 7   ALK PHOS U/L 69 84   ALBUMIN g/dL 2 2* 2 7*   TOTAL BILIRUBIN mg/dL 0 42 0 47     Results from last 7 days   Lab Units 11/21/22  0636 11/20/22  2241   MAGNESIUM mg/dL 2 3  --    PHOSPHORUS mg/dL 4 3* 4 4*      Results from last 7 days   Lab Units 11/20/22  1235   INR  1 08   PTT seconds 26          Results from last 7 days   Lab Units 11/20/22  2243 11/20/22  1235   LACTIC ACID mmol/L 1 5 2 0     ABG:  Results from last 7 days   Lab Units 11/21/22  1553   PH ART  7 278*   PCO2 ART mm Hg 76 2*   PO2 ART mm Hg 128 7   HCO3 ART mmol/L 34 8*   BASE EXC ART mmol/L 6 5   ABG SOURCE  Radial, Left     VBG:  Results from last 7 days   Lab Units 11/21/22  1553 11/20/22  2329   PH MAYE   --  7 288*   PCO2 MAYE mm Hg  --  67 2*   PO2 MAYE mm Hg  --  54 0*   HCO3 MAYE mmol/L  --  31 4*   BASE EXC MAYE mmol/L  --  4 0   ABG SOURCE  Radial, Left  --      Results from last 7 days   Lab Units 11/21/22  0636 11/20/22  1102   PROCALCITONIN ng/ml 2 67* 0 26*       Micro  Results from last 7 days   Lab Units 11/20/22  1322 11/20/22  1245 11/20/22  1220   GRAM STAIN RESULT   --  Gram negative rods* Gram negative rods*   URINE CULTURE  60,000-69,000 cfu/ml Pseudomonas aeruginosa*  --   --        Imaging: I have personally reviewed pertinent reports  Intake and Output  I/O       11/19 0701  11/20 0700 11/20 0701  11/21 0700    I V  (mL/kg)  214 3 (2)    IV Piggyback  600    Total Intake(mL/kg)  814 3 (7 5)    Urine (mL/kg/hr)  1650    Total Output  1650    Net  -835 7                Height and Weights   Height: 6' (182 9 cm)  IBW (Ideal Body Weight): 77 6 kg  Body mass index is 33 52 kg/m²  Weight (last 2 days)     Date/Time Weight    11/21/22 0600 112 (247 14)    11/20/22 1048 109 (240)            Nutrition       Diet Orders   (From admission, onward)             Start     Ordered    11/20/22 2252  Diet NPO; Sips with meds  Diet effective now        References:    Nutrtion Support Algorithm Enteral vs  Parenteral   Question Answer Comment   Diet Type NPO    NPO Except: Sips with meds    RD to adjust diet per protocol?  Yes        11/20/22 2251                  Active Medications  Scheduled Meds:  Current Facility-Administered Medications   Medication Dose Route Frequency Provider Last Rate   • apixaban  5 mg Oral BID Deyvi Rosario, DO     • aspirin  81 mg Oral Daily Deyvi Rosario, DO     • budesonide  0 5 mg Nebulization Q12H Sarah Anisha, DO     • carvedilol  6 25 mg Oral BID With Meals Deyvi Rosario, DO     • cefepime  2,000 mg Intravenous Q12H Deyvi Rosario, DO 2,000 mg (11/21/22 1022)   • fluticasone  1 puff Inhalation Q12H [facility] Deyvi Rosario, DO     • formoterol  20 mcg Nebulization Q12H Sarah Anisha, DO     • gabapentin  100 mg Oral TID Deyvi Rosario DO • insulin glargine  40 Units Subcutaneous HS Deyvi [facility], DO     • insulin lispro  1-5 Units Subcutaneous TID AC [facility], DO     • ipratropium  0 5 mg Nebulization Q6H Danielle Gilbert PA-C     • isosorbide mononitrate  30 mg Oral Daily Deyvi [facility], DO     • levalbuterol  1 25 mg Nebulization Q6H Danielle Gilbert PA-C     • OLANZapine  5 mg Oral HS PRN Stephanie Rosario MD     • ondansetron  4 mg Intravenous Q6H PRN Deyvi [facility], DO     • pantoprazole  40 mg Oral BID AC [facility], DO     • pravastatin  80 mg Oral Daily With [employer], DO     • senna-docusate sodium  1 tablet Oral HS [facility], DO     • tamsulosin  0 4 mg Oral Daily With Dinner Deyvi [facility], DO       Continuous Infusions:     PRN Meds:   OLANZapine, 5 mg, HS PRN  ondansetron, 4 mg, Q6H PRN        Allergies   Allergies   Allergen Reactions   • Crestor [Rosuvastatin] Other (See Comments)     Unknown     • Lisinopril GI Intolerance, Dizziness and Abdominal Pain   • Metformin GI Intolerance     ---------------------------------------------------------------------------------------  Advance Directive and Living Will:      Power of :    POLST:    ---------------------------------------------------------------------------------------  Care Time Delivered:   Upon my evaluation, this patient had a high probability of imminent or life-threatening deterioration due to Acute hypoxic respiratory failure, which required my direct attention, intervention, and personal management  I have personally provided 60 minutes of critical care time, exclusive of procedures, teaching, family meetings, and any prior time recorded by providers other than myself  Laron Domínguez MD      Portions of the record may have been created with voice recognition software  Occasional wrong word or "sound a like" substitutions may have occurred due to the inherent limitations of voice recognition software    Read the chart carefully and recognize, using context, where substitutions have occurred

## 2022-11-22 LAB
ANION GAP SERPL CALCULATED.3IONS-SCNC: 4 MMOL/L (ref 4–13)
ARTERIAL PATENCY WRIST A: YES
BASE EXCESS BLDA CALC-SCNC: 7.1 MMOL/L
BUN SERPL-MCNC: 46 MG/DL (ref 5–25)
CALCIUM SERPL-MCNC: 8.3 MG/DL (ref 8.3–10.1)
CHLORIDE SERPL-SCNC: 102 MMOL/L (ref 96–108)
CO2 SERPL-SCNC: 31 MMOL/L (ref 21–32)
CREAT SERPL-MCNC: 2.72 MG/DL (ref 0.6–1.3)
ERYTHROCYTE [DISTWIDTH] IN BLOOD BY AUTOMATED COUNT: 15.5 % (ref 11.6–15.1)
FERRITIN SERPL-MCNC: 63 NG/ML (ref 8–388)
GFR SERPL CREATININE-BSD FRML MDRD: 21 ML/MIN/1.73SQ M
GLUCOSE SERPL-MCNC: 241 MG/DL (ref 65–140)
GLUCOSE SERPL-MCNC: 258 MG/DL (ref 65–140)
GLUCOSE SERPL-MCNC: 274 MG/DL (ref 65–140)
GLUCOSE SERPL-MCNC: 328 MG/DL (ref 65–140)
GLUCOSE SERPL-MCNC: 356 MG/DL (ref 65–140)
HCO3 BLDA-SCNC: 33.4 MMOL/L (ref 22–28)
HCT VFR BLD AUTO: 24.8 % (ref 36.5–49.3)
HGB BLD-MCNC: 7.1 G/DL (ref 12–17)
IRON SATN MFR SERPL: 6 % (ref 20–50)
IRON SERPL-MCNC: 14 UG/DL (ref 65–175)
MAGNESIUM SERPL-MCNC: 2.5 MG/DL (ref 1.6–2.6)
MCH RBC QN AUTO: 25.7 PG (ref 26.8–34.3)
MCHC RBC AUTO-ENTMCNC: 28.6 G/DL (ref 31.4–37.4)
MCV RBC AUTO: 90 FL (ref 82–98)
NON VENT- BIPAP: ABNORMAL
O2 CT BLDA-SCNC: 11.2 ML/DL (ref 16–23)
OXYHGB MFR BLDA: 97.2 % (ref 94–97)
PCO2 BLDA: 59.3 MM HG (ref 36–44)
PH BLDA: 7.37 [PH] (ref 7.35–7.45)
PHOSPHATE SERPL-MCNC: 3.8 MG/DL (ref 2.3–4.1)
PLATELET # BLD AUTO: 132 THOUSANDS/UL (ref 149–390)
PMV BLD AUTO: 10.8 FL (ref 8.9–12.7)
PO2 BLDA: 123.4 MM HG (ref 75–129)
POTASSIUM SERPL-SCNC: 4.2 MMOL/L (ref 3.5–5.3)
RBC # BLD AUTO: 2.76 MILLION/UL (ref 3.88–5.62)
SODIUM SERPL-SCNC: 137 MMOL/L (ref 135–147)
SPECIMEN SOURCE: ABNORMAL
TIBC SERPL-MCNC: 229 UG/DL (ref 250–450)
WBC # BLD AUTO: 12.99 THOUSAND/UL (ref 4.31–10.16)

## 2022-11-22 RX ORDER — INSULIN LISPRO 100 [IU]/ML
1-6 INJECTION, SOLUTION INTRAVENOUS; SUBCUTANEOUS
Status: DISCONTINUED | OUTPATIENT
Start: 2022-11-22 | End: 2022-11-25

## 2022-11-22 RX ORDER — INSULIN LISPRO 100 [IU]/ML
10 INJECTION, SOLUTION INTRAVENOUS; SUBCUTANEOUS
Status: DISCONTINUED | OUTPATIENT
Start: 2022-11-22 | End: 2022-11-25

## 2022-11-22 RX ORDER — INSULIN LISPRO 100 [IU]/ML
10 INJECTION, SOLUTION INTRAVENOUS; SUBCUTANEOUS
Status: DISCONTINUED | OUTPATIENT
Start: 2022-11-23 | End: 2022-11-25

## 2022-11-22 RX ORDER — INSULIN LISPRO 100 [IU]/ML
1-6 INJECTION, SOLUTION INTRAVENOUS; SUBCUTANEOUS
Status: DISCONTINUED | OUTPATIENT
Start: 2022-11-22 | End: 2022-11-22

## 2022-11-22 RX ORDER — LANOLIN ALCOHOL/MO/W.PET/CERES
3 CREAM (GRAM) TOPICAL
Status: DISCONTINUED | OUTPATIENT
Start: 2022-11-22 | End: 2022-11-25

## 2022-11-22 RX ORDER — INSULIN LISPRO 100 [IU]/ML
5 INJECTION, SOLUTION INTRAVENOUS; SUBCUTANEOUS
Status: DISCONTINUED | OUTPATIENT
Start: 2022-11-22 | End: 2022-11-22

## 2022-11-22 RX ORDER — ECHINACEA PURPUREA EXTRACT 125 MG
1 TABLET ORAL
Status: DISCONTINUED | OUTPATIENT
Start: 2022-11-22 | End: 2022-11-25

## 2022-11-22 RX ADMIN — BUDESONIDE 0.5 MG: 0.5 INHALANT ORAL at 07:20

## 2022-11-22 RX ADMIN — FLUTICASONE PROPIONATE 1 PUFF: 110 AEROSOL, METERED RESPIRATORY (INHALATION) at 09:58

## 2022-11-22 RX ADMIN — APIXABAN 5 MG: 5 TABLET, FILM COATED ORAL at 17:15

## 2022-11-22 RX ADMIN — GABAPENTIN 100 MG: 100 CAPSULE ORAL at 21:22

## 2022-11-22 RX ADMIN — INSULIN GLARGINE 40 UNITS: 100 INJECTION, SOLUTION SUBCUTANEOUS at 21:22

## 2022-11-22 RX ADMIN — ASPIRIN 81 MG: 81 TABLET, COATED ORAL at 08:34

## 2022-11-22 RX ADMIN — BUDESONIDE 0.5 MG: 0.5 INHALANT ORAL at 20:34

## 2022-11-22 RX ADMIN — LEVALBUTEROL HYDROCHLORIDE 1.25 MG: 1.25 SOLUTION, CONCENTRATE RESPIRATORY (INHALATION) at 07:15

## 2022-11-22 RX ADMIN — INSULIN LISPRO 10 UNITS: 100 INJECTION, SOLUTION INTRAVENOUS; SUBCUTANEOUS at 18:08

## 2022-11-22 RX ADMIN — IPRATROPIUM BROMIDE 0.5 MG: 0.5 SOLUTION RESPIRATORY (INHALATION) at 13:30

## 2022-11-22 RX ADMIN — CARVEDILOL 6.25 MG: 6.25 TABLET, FILM COATED ORAL at 17:15

## 2022-11-22 RX ADMIN — SENNOSIDES AND DOCUSATE SODIUM 1 TABLET: 8.6; 5 TABLET ORAL at 21:22

## 2022-11-22 RX ADMIN — GABAPENTIN 100 MG: 100 CAPSULE ORAL at 17:18

## 2022-11-22 RX ADMIN — CEFEPIME 2000 MG: 2 INJECTION, POWDER, FOR SOLUTION INTRAVENOUS at 09:52

## 2022-11-22 RX ADMIN — IPRATROPIUM BROMIDE 0.5 MG: 0.5 SOLUTION RESPIRATORY (INHALATION) at 20:34

## 2022-11-22 RX ADMIN — INSULIN LISPRO 5 UNITS: 100 INJECTION, SOLUTION INTRAVENOUS; SUBCUTANEOUS at 18:07

## 2022-11-22 RX ADMIN — INSULIN LISPRO 5 UNITS: 100 INJECTION, SOLUTION INTRAVENOUS; SUBCUTANEOUS at 12:31

## 2022-11-22 RX ADMIN — INSULIN LISPRO 5 UNITS: 100 INJECTION, SOLUTION INTRAVENOUS; SUBCUTANEOUS at 09:50

## 2022-11-22 RX ADMIN — GABAPENTIN 100 MG: 100 CAPSULE ORAL at 08:34

## 2022-11-22 RX ADMIN — LEVALBUTEROL HYDROCHLORIDE 1.25 MG: 1.25 SOLUTION, CONCENTRATE RESPIRATORY (INHALATION) at 20:34

## 2022-11-22 RX ADMIN — IPRATROPIUM BROMIDE 0.5 MG: 0.5 SOLUTION RESPIRATORY (INHALATION) at 00:15

## 2022-11-22 RX ADMIN — PANTOPRAZOLE SODIUM 40 MG: 40 TABLET, DELAYED RELEASE ORAL at 06:37

## 2022-11-22 RX ADMIN — INSULIN LISPRO 5 UNITS: 100 INJECTION, SOLUTION INTRAVENOUS; SUBCUTANEOUS at 21:23

## 2022-11-22 RX ADMIN — Medication 3 MG: at 21:32

## 2022-11-22 RX ADMIN — IPRATROPIUM BROMIDE 0.5 MG: 0.5 SOLUTION RESPIRATORY (INHALATION) at 07:15

## 2022-11-22 RX ADMIN — INSULIN LISPRO 3 UNITS: 100 INJECTION, SOLUTION INTRAVENOUS; SUBCUTANEOUS at 12:32

## 2022-11-22 RX ADMIN — LEVALBUTEROL HYDROCHLORIDE 1.25 MG: 1.25 SOLUTION, CONCENTRATE RESPIRATORY (INHALATION) at 00:15

## 2022-11-22 RX ADMIN — PANTOPRAZOLE SODIUM 40 MG: 40 TABLET, DELAYED RELEASE ORAL at 17:18

## 2022-11-22 RX ADMIN — APIXABAN 5 MG: 5 TABLET, FILM COATED ORAL at 08:34

## 2022-11-22 RX ADMIN — TAMSULOSIN HYDROCHLORIDE 0.4 MG: 0.4 CAPSULE ORAL at 17:15

## 2022-11-22 RX ADMIN — FORMOTEROL FUMARATE DIHYDRATE 20 MCG: 20 SOLUTION RESPIRATORY (INHALATION) at 07:16

## 2022-11-22 RX ADMIN — LEVALBUTEROL HYDROCHLORIDE 1.25 MG: 1.25 SOLUTION, CONCENTRATE RESPIRATORY (INHALATION) at 13:30

## 2022-11-22 RX ADMIN — PRAVASTATIN SODIUM 80 MG: 80 TABLET ORAL at 17:15

## 2022-11-22 NOTE — ASSESSMENT & PLAN NOTE
· Multifactorial in the setting of CHF exacerbation, possible pneumonia, history severe COPD suspected JACQUELINE/OHS, Lung CA s/p SBRT  · Patient on 2L NC at baseline  · S/p HFNC and BIPAP  · Currently saturating well on 3L NC  · Continue Bipap HS  · Started on Cefepime and Vancomyin in the ED for presumed pneumonia  · CT imaging of his chest showed new near complete occlusion of middle lobe and right lower lobe bronchial branches with atelectasis   · Blood in urine cultures growing Pseudomonas -> vancomycin DC'd and continued on cefepime -> cefepime later transitioned to ceftazidime in setting of persistent AMS  · Continue nebulizers   · Patient receiving Seroquel 12 5 mg qHS and tolerating BiPAP  Continue to encourage BiPAP use at night  · Concern for patient's competency to make informed decision making as he has refused BiPAP in past despite it being life threatening  Neuropsych consult has been placed

## 2022-11-22 NOTE — ASSESSMENT & PLAN NOTE
Lab Results   Component Value Date    HGBA1C 9 7 (H) 10/10/2022       Recent Labs     11/26/22  0522 11/26/22  0756 11/26/22  1123 11/26/22  1617   POCGLU 65 142* 248* 196*       Blood Sugar Average: Last 72 hrs:  (P) 831 [id number]     · Continue lantus 30 units HS (at home patient takes 30 units HS)  · Continue mealtime insulin 5 units t i d   With meals  · ISS  · Goal -180

## 2022-11-22 NOTE — OCCUPATIONAL THERAPY NOTE
Occupational Therapy Evaluation     Patient Name: Rachel Traore  Today's Date: 11/22/2022  Problem List  Principal Problem:    Acute on chronic diastolic heart failure (HCC)  Active Problems:    COPD (chronic obstructive pulmonary disease) (HCC)    HLD (hyperlipidemia)    Type 2 diabetes mellitus with hyperglycemia, with long-term current use of insulin (HCC)    Pleural effusion on right    Essential hypertension    CAD (coronary artery disease)    Acute on chronic respiratory failure with hypoxia and hypercapnia (HCC)    Adenocarcinoma of lung (HCC)    Stage 3b chronic kidney disease (CKD) ([facility] )    A-fib ([facility] )    Sepsis ([facility] )    Pneumonia    Past Medical History  Past Medical History:   Diagnosis Date   • Abdominal pain    • MARANDA (acute kidney injury) ([facility] ) 6/19/2018   • Cardiac disease    • CHF (congestive heart failure) (HCC)    • COPD, severe (HCC)    • Coronary artery disease    • DDD (degenerative disc disease), lumbar 9/1/2020   • Diabetes mellitus (HCC)    • Dyspnea    • GERD (gastroesophageal reflux disease)    • Hyperlipidemia    • Hypertension    • MI (myocardial infarction) ([facility] )     with 3 stents   • Nodule of apex of right lung    • JACQUELINE (obstructive sleep apnea)    • Prostate cancer [facility])      Past Surgical History  Past Surgical History:   Procedure Laterality Date   • ABDOMINAL SURGERY      exploratory   • ANGIOPLASTY      3 stents   • APPENDECTOMY     • COLONOSCOPY  2015   • CT NEEDLE BIOPSY LUNG  11/3/2020   • ESOPHAGOGASTRODUODENOSCOPY N/A 10/2/2017    Procedure: ESOPHAGOGASTRODUODENOSCOPY (EGD); Surgeon: Ron Paredes MD;  Location: [facility];   Service: Gastroenterology   • IR THORACENTESIS  11/3/2020   • IR THORACENTESIS  11/8/2022   • KNEE CARTILAGE SURGERY     • OTHER SURGICAL HISTORY      stent placement   • PROSTATE SURGERY     • SKIN GRAFT      Basal cell CA back         11/22/22 1050   OT Last Visit   OT Visit Date 11/22/22   Note Type   Note type Evaluation   Pain Assessment Pain Assessment Tool 0-10   Pain Score No Pain   Restrictions/Precautions   Weight Bearing Precautions Per Order No   Other Precautions Fall Risk;O2;Telemetry;Cognitive; Chair Alarm; Bed Alarm  (+6L 02)   Home Living   Home Layout (pt lived in a one story house with 1STE (per CM house has been condemned, pt can continued to lives with son )   Bathroom Shower/Tub Walk-in shower   Bathroom Toilet Standard   Bathroom Equipment (No DME)   [address] [facility]   Prior Function   Level of [place] Independent with ADLs   Lives With (S)  Son   Receives Help From Family  (son (works at night), SNL)   IADLs Son assists with driving (pt reports will drive to grocery store) and meals  Falls in the last 6 months    Vocational Retired   Lifestyle   Autonomy I with ADL's/ assistance with IADL's, no AD with fucntional ambulation, +drives, 2L 02 at baseline   Reciprocal Relationships son, SNL   Service to Others retired   Intrinsic Gratification TV   ADL   [facility] 5  [address] [facility] 5  [address] [facility] 3  Moderate Assistance   [address] 4  [address] 2  Maximal [address]  3  Moderate Assistance   Bed Mobility   Supine to Sit 4  Minimal assistance   Additional items Assist x 1;HOB elevated; Increased time required   Transfers   Sit to Stand 4  Minimal assistance   Additional items Assist x 1   Stand to Sit 4  Minimal assistance   Additional items Assist x 1   Additional Comments +RW   Functional Mobility   Functional Mobility 4  Minimal assistance   Additional Comments min a with RW +3 small steps to chair and verbal cues for safety and assistance with 02 line management     Additional items Rolling walker   Balance   Static Sitting Fair   Dynamic Sitting Fair -   Static Standing Poor +   Dynamic Standing Poor +   Ambulatory Poor   Activity Tolerance   Activity Tolerance Patient limited by fatigue   Medical Staff Made Aware PT [place]  due to the patient's co-morbidities, clinically unstable presentation, and present impairments which are a regression from the patient's baseline  Nurse Made Aware RN cleared pt for therapy   RUE Assessment   RUE Assessment WFL   LUE Assessment   LUE Assessment WFL   Hand Function   Gross Motor Coordination Functional   Fine Motor Coordination Functional   Vision-Basic Assessment   Current Vision No visual deficits   Perception   Inattention/Neglect Appears intact   Cognition   Arousal/Participation Alert; Responsive; Cooperative   Attention Attends with cues to redirect   Orientation Level Oriented to person;Oriented to place;Oriented to time  (grossly to situation)   Memory Decreased recall of precautions;Decreased recall of recent events;Decreased short term memory   Following Commands Follows one step commands with increased time or repetition   Comments pt motivated for therapy, able to provide social history, decreased insight into deficits, fair self awareness  Assessment   Limitation Decreased ADL status; Decreased endurance;Decreased self-care trans;Decreased high-level ADLs; Decreased Safe judgement during ADL   Prognosis Fair   Assessment Pt is a 78 y o  male who was admitted to [facility] on 2022 with Acute on chronic diastolic heart failure ([address] )   Pt's problem list also includes PMH of  has a past medical history of Abdominal pain, MARANDA (acute kidney injury) ([address] ) (2018), Cardiac disease, CHF (congestive heart failure) ([address] ), COPD, severe ([address] ), Coronary artery disease, DDD (degenerative disc disease), lumbar (2020), Diabetes mellitus ([address] ), Dyspnea, GERD (gastroesophageal reflux disease), Hyperlipidemia, Hypertension, MI (myocardial infarction) ([address] ), Nodule of apex of right lung, JACQUELINE (obstructive sleep apnea), and Prostate cancer ([address] )    At baseline pt was completing I with ADL's/assistance from son with IADL's no AD with functional mobility   Pt lives with his son in a [facility] 1STE  Currently pt requires min/mod a LB (max for LB dressing) for overall ADLS and min a with RW and 2L 02 assistance for line management for functional mobility/transfers  Pt currently presents with impairments in the following categories -steps to enter environment, limited home support, difficulty performing ADLS, difficulty performing IADLS , limited insight into deficits and compliance activity tolerance, endurance, standing balance/tolerance and sitting balance/tolerance  These impairments, as well as pt's fatigue, decreased caregiver support and risk for falls  limit pt's ability to safely engage in all baseline areas of occupation, includingbathing, dressing, toileting, functional mobility/transfers, community mobility, laundry , driving, house maintenance, medication management, meal prep, cleaning, social participation  and leisure activities  The patient's raw score on the -PAC Daily Activity inpatient short form is 16, standardized score is 35 96, less than 39 4  Patients at this level are likely to benefit from discharge to post-acute rehabilitation services  Please refer to the recommendation of the Occupational Therapist for safe discharge planning  From OT standpoint, recommend STR upon D/C  OT will continue to follow to address the below stated goals  Goals   Patient Goals return to bed soon   LTG Time Frame 10-14   Long Term Goal #1 see goals below   Plan   Treatment Interventions ADL retraining;Functional transfer training; Endurance training;Cognitive reorientation;Patient/family training;Equipment evaluation/education; Compensatory technique education; Energy conservation; Activityengagement   Goal Expiration Date 12/06/22   OT Frequency (2-4x week)   Recommendation   OT Discharge Recommendation Post acute rehabilitation services   Equipment Recommended Bedside commode; Shower/Tub chair with back ($) if discharges home  AM-PAC Daily Activity Inpatient   Lower Body Dressing 2   Bathing 2   Toileting 2   Upper Body Dressing 3   Grooming 3   Eating 4   Daily Activity Raw Score 16   Daily Activity Standardized Score (Calc for Raw Score >=11) 35 96   AM-PAC Applied Cognition Inpatient   Following a Speech/Presentation 2   Understanding Ordinary Conversation 4   Taking Medications 2   Remembering Where Things Are Placed or Put Away 3   Remembering List of 4-5 Errands 3   Taking Care of Complicated Tasks 2   Applied Cognition Raw Score 16   Applied Cognition Standardized Score 35 03   End of Consult   Patient Position at End of Consult Bedside chair   Nurse Communication Nurse aware of consult      Occupational Therapy Goals:    *Mod I with bed mobility to engage in functional tasks  *Mod I Adl's after setup with use of AE PRN  *Mod I toileting and clothing management   *Mod I functional mobility and transfers to/from all surfaces with Fair + dynamic balance and safety for participation in dynamic adls and iadl tasks   *Demonstrate good carryover with safe use of RW during functional tasks   *Assess DME needs   *Increase activity tolerance to 25-30 minutes for participation in adls and enjoyable activities  *Pt to participate in further cognitive testing with good attention and participation to assist with safe d/c recommendations  *Pt will follow 100% simple one step verbal commands and be A/Ox4 consistently t/o use of external environmental cues w/ mod I  *Demonstrate good carryover of pt/family education and training with good tolerance for increased safety and independence with ADL's/ADl's  *Pt will improve standing balance to 2-3 minutes with functional tasks to increase I with toileting/transfers    *Patient will demonstrate 100% carryover of energy conservation techniques t/o functional I/ADL/leisure tasks w/o cues s/p skilled education to increase endurance during functional tasks    Avani Yuridia MOT, OTR/L

## 2022-11-22 NOTE — CASE MANAGEMENT
Case Management Discharge Planning Note    Patient name Ramandeep Horan   Location [facility] 201/[facility] 980-25 MRN [id number]  : 1945 Date 2022       Current Admission Date: 2022  Current Admission Diagnosis:Acute on chronic diastolic heart failure [facility])   Patient Active Problem List    Diagnosis Date Noted   • Sepsis ([facility] ) 2022   • Pneumonia 2022   • Acute on chronic diastolic heart failure ([place] [address] ) 2022   • Shortness of breath 2022   • Acute on chronic anemia 10/29/2022   • Acute-on-chronic kidney injury ([place] [address] ) 10/27/2022   • SIRS (systemic inflammatory response syndrome) ([place] [address] ) 10/27/2022   • A-fib ([place] [address] ) 10/10/2022   • Frequent hospital admissions 2022   • Hypothermia 2022   • Epididymitis, bilateral 06/15/2022   • Chronic left-sided low back pain without sciatica 2022   • Stage 3b chronic kidney disease (CKD) ([place] [address] ) 2022   • History of prostate cancer 2022   • Adenocarcinoma of lung ([place] [address] ) 2022   • Fracture of navicular bone of left foot 2021   • Acute on chronic respiratory failure with hypoxia and hypercapnia ([place] [address] ) 04/15/2021   • PAD (peripheral artery disease) ([place] [address] ) 2021   • DDD (degenerative disc disease), lumbar 2020   • Anemia, iron deficiency 2020   • Lung nodule 2020   • Pulmonary hypertension ([facility] ) 2019   • JACQUELINE (obstructive sleep apnea) 2019   • COPD with acute exacerbation ([place] [address] ) 2019   • Oxygen dependent 2018   • Acute metabolic encephalopathy    • RBBB 2018   • CAD (coronary artery disease) 2018   • Chronic diastolic heart failure ([facility] ) 2018   • Pleural effusion on right 10/31/2017   • Diabetic neuropathy ([place] [address] ) 2017   • Essential hypertension 2016   • Venous stasis dermatitis of both lower extremities 11/15/2016   • Type 2 diabetes mellitus with hyperglycemia, with long-term current use of insulin ([facility] ) 2016   • COPD (chronic obstructive pulmonary disease) ([facility] ) 01/20/2016   • HLD (hyperlipidemia) 01/20/2016      LOS (days): 2  Geometric Mean LOS (GMLOS) (days):   Days to GMLOS:     OBJECTIVE:  Risk of Unplanned Readmission Score: 82 55         Current admission status: Inpatient   Preferred Pharmacy:   [facility], [address] [place] [address] [address] [place] [address] [address] [place] [address]  Phone: [phone] Fax: [phone], [facility] [address] [facility] [address] [facility] 38 [address] [facility]  Phone: [phone] Fax: [phone]    Primary Care Provider: Walter Polk MD    Primary Insurance: [facility]  Secondary Insurance:     DISCHARGE DETAILS:                                Additional Comments: Met with patient at bedside to discuss discharge planning  He is open to rehab at this time, but expressed he does not want to get "stuck" at a facility  Patient stated he continues to live with his son, however they are in the process of moving from their current home because the landlord needs to make repairs to the property  Discussed with patient that CM will likely need to option the patient in case he needs to stay at a facility longer than his insurance will pay  Patient understood and agreed to blanket referrals to area facilities  CM will complete optioning paperwork and make facility referrals, pending medical stability

## 2022-11-22 NOTE — ASSESSMENT & PLAN NOTE
· CT imaging of his chest showed a new near complete occlusion of middle lobe and right lower lobe bronchial branches with atelectasis   · Blood in urine cultures positive for Pseudomonas  · Repeat blood cultures negative  · Started on Cefepime and Vancomycin in the ED -> now patient on ceftazidime  · ID following  · Trend WBC count and fever curve

## 2022-11-22 NOTE — PLAN OF CARE
Problem: MOBILITY - ADULT  Goal: Maintain or return to baseline ADL function  Description: INTERVENTIONS:  -  Assess patient's ability to carry out ADLs; assess patient's baseline for ADL function and identify physical deficits which impact ability to perform ADLs (bathing, care of mouth/teeth, toileting, grooming, dressing, etc )  - Assess/evaluate cause of self-care deficits   - Assess range of motion  - Assess patient's mobility; develop plan if impaired  - Assess patient's need for assistive devices and provide as appropriate  - Encourage maximum independence but intervene and supervise when necessary  - Involve family in performance of ADLs  - Assess for home care needs following discharge   - Consider OT consult to assist with ADL evaluation and planning for discharge  - Provide patient education as appropriate  11/22/2022 1122 by Radha Odonnell RN  Outcome: Progressing  11/22/2022 1118 by Radha Odonnell RN  Outcome: Progressing  Goal: Maintains/Returns to pre admission functional level  Description: INTERVENTIONS:  - Perform BMAT or MOVE assessment daily    - Set and communicate daily mobility goal to care team and patient/family/caregiver  - Collaborate with rehabilitation services on mobility goals if consulted  - Perform Range of Motion  times a day  - Reposition patient every  hours    - Dangle patient  times a day  - Stand patient  times a day  - Ambulate patient  times a day  - Out of bed to chair  times a day   - Out of bed for meals  times a day  - Out of bed for toileting  - Record patient progress and toleration of activity level   11/22/2022 1122 by Radha Odonnell RN  Outcome: Progressing  11/22/2022 1118 by Radha Odonnell RN  Outcome: Progressing     Problem: Potential for Falls  Goal: Patient will remain free of falls  Description: INTERVENTIONS:  - Educate patient/family on patient safety including physical limitations  - Instruct patient to call for assistance with activity   - Consult OT/PT to assist with strengthening/mobility   - Keep Call bell within reach  - Keep bed low and locked with side rails adjusted as appropriate  - Keep care items and personal belongings within reach  - Initiate and maintain comfort rounds  - Make Fall Risk Sign visible to staff  - Offer Toileting every  Hours, in advance of need  - Initiate/Maintain alarm  - Obtain necessary fall risk management equipment:   - Apply yellow socks and bracelet for high fall risk patients  - Consider moving patient to room near nurses station  11/22/2022 1122 by Kusum Clarke RN  Outcome: Progressing  11/22/2022 1118 by Kusum Clarke RN  Outcome: Progressing     Problem: Prexisting or High Potential for Compromised Skin Integrity  Goal: Skin integrity is maintained or improved  Description: INTERVENTIONS:  - Identify patients at risk for skin breakdown  - Assess and monitor skin integrity  - Assess and monitor nutrition and hydration status  - Monitor labs   - Assess for incontinence   - Turn and reposition patient  - Assist with mobility/ambulation  - Relieve pressure over bony prominences  - Avoid friction and shearing  - Provide appropriate hygiene as needed including keeping skin clean and dry  - Evaluate need for skin moisturizer/barrier cream  - Collaborate with interdisciplinary team   - Patient/family teaching  - Consider wound care consult   11/22/2022 1122 by Kusum Clarke RN  Outcome: Progressing  11/22/2022 1118 by Kusum Clarke RN  Outcome: Progressing     Problem: RESPIRATORY - ADULT  Goal: Achieves optimal ventilation and oxygenation  Description: INTERVENTIONS:  - Assess for changes in respiratory status  - Assess for changes in mentation and behavior  - Position to facilitate oxygenation and minimize respiratory effort  - Oxygen administered by appropriate delivery if ordered  - Initiate smoking cessation education as indicated  - Encourage broncho-pulmonary hygiene including cough, deep breathe, Incentive Spirometry  - Assess the need for suctioning and aspirate as needed  - Assess and instruct to report SOB or any respiratory difficulty  - Respiratory Therapy support as indicated  11/22/2022 1122 by Cristal Perez RN  Outcome: Progressing  11/22/2022 1118 by Cristal Perez RN  Outcome: Progressing     Problem: PAIN - ADULT  Goal: Verbalizes/displays adequate comfort level or baseline comfort level  Description: Interventions:  - Encourage patient to monitor pain and request assistance  - Assess pain using appropriate pain scale  - Administer analgesics based on type and severity of pain and evaluate response  - Implement non-pharmacological measures as appropriate and evaluate response  - Consider cultural and social influences on pain and pain management  - Notify physician/advanced practitioner if interventions unsuccessful or patient reports new pain  Outcome: Progressing     Problem: INFECTION - ADULT  Goal: Absence or prevention of progression during hospitalization  Description: INTERVENTIONS:  - Assess and monitor for signs and symptoms of infection  - Monitor lab/diagnostic results  - Monitor all insertion sites, i e  indwelling lines, tubes, and drains  - Monitor endotracheal if appropriate and nasal secretions for changes in amount and color  - [place] appropriate cooling/warming therapies per order  - Administer medications as ordered  - Instruct and encourage patient and family to use good hand hygiene technique  - Identify and instruct in appropriate isolation precautions for identified infection/condition  Outcome: Progressing  Goal: Absence of fever/infection during neutropenic period  Description: INTERVENTIONS:  - Monitor WBC    Outcome: Progressing     Problem: SAFETY ADULT  Goal: Maintain or return to baseline ADL function  Description: INTERVENTIONS:  -  Assess patient's ability to carry out ADLs; assess patient's baseline for ADL function and identify physical deficits which impact ability to perform ADLs (bathing, care of mouth/teeth, toileting, grooming, dressing, etc )  - Assess/evaluate cause of self-care deficits   - Assess range of motion  - Assess patient's mobility; develop plan if impaired  - Assess patient's need for assistive devices and provide as appropriate  - Encourage maximum independence but intervene and supervise when necessary  - Involve family in performance of ADLs  - Assess for home care needs following discharge   - Consider OT consult to assist with ADL evaluation and planning for discharge  - Provide patient education as appropriate  11/22/2022 1122 by Meredith Gibbs RN  Outcome: Progressing  11/22/2022 1118 by Meredith Gibbs RN  Outcome: Progressing  Goal: Maintains/Returns to pre admission functional level  Description: INTERVENTIONS:  - Perform BMAT or MOVE assessment daily    - Set and communicate daily mobility goal to care team and patient/family/caregiver  - Collaborate with rehabilitation services on mobility goals if consulted  - Perform Range of Motion  times a day  - Reposition patient every  hours    - Dangle patient  times a day  - Stand patient  times a day  - Ambulate patient  times a day  - Out of bed to chair  times a day   - Out of bed for meals  times a day  - Out of bed for toileting  - Record patient progress and toleration of activity level   11/22/2022 1122 by Meredith Gibbs RN  Outcome: Progressing  11/22/2022 1118 by Meredith Gibbs RN  Outcome: Progressing  Goal: Patient will remain free of falls  Description: INTERVENTIONS:  - Educate patient/family on patient safety including physical limitations  - Instruct patient to call for assistance with activity   - Consult OT/PT to assist with strengthening/mobility   - Keep Call bell within reach  - Keep bed low and locked with side rails adjusted as appropriate  - Keep care items and personal belongings within reach  - Initiate and maintain comfort rounds  - Make Fall Risk Sign visible to staff  - Offer Toileting every  Hours, in advance of need  - Initiate/Maintain alarm  - Obtain necessary fall risk management equipment  - Apply yellow socks and bracelet for high fall risk patients  - Consider moving patient to room near nurses station  11/22/2022 1122 by Radha Odonnell RN  Outcome: Progressing  11/22/2022 1118 by Radha Odonnell RN  Outcome: Progressing

## 2022-11-22 NOTE — RESPIRATORY THERAPY NOTE
Resp care   11/22/22 0721   Inhalation Therapy Tx   $ Inhalation Therapy Performed Yes   $ Pulse Oximetry Spot Check Charge Completed   Pre-Treatment Pulse 97   Breath Sounds Pre-Treatment Bilateral Diminished   Breath Sounds Post-Treatment Bilateral Diminished   Post-Treatment Pulse 76   Resp Comments MF decreased to 6lpm  pt C02 retainer  will try to get back to baseline 02 needs

## 2022-11-22 NOTE — PROGRESS NOTES
Daily Progress Note - Critical Care   Rachel Traore  78 y o  male MRN: [id number]  Unit/Bed#: [facility] 201-01 Encounter: [id number]        ----------------------------------------------------------------------------------------  HPI/24hr events: Patient is a 28-year-old male with a past medical history of severe COPD, diastolic CHF, atrial fibrillation on Eliquis, lung cancer status post SBRT, type 2 diabetes, stage 3b CKD, GERD, CAD and hypertension who presented to the emergency department 11/20 complaining of worsening shortness of breath  His worsening hypoxic respiratory failure was thought to be secondary to acute CHF exacerbation versus COPD exacerbation versus possible underlying pneumonia  He was placed on high-flow nasal cannula in the emergency department as well as a nitro drip for elevated blood pressure and Lasix  The patient met SIRS criteria and was started on antibiotics and blood cultures were drawn  He was also found to have a new near complete occlusion of the middle lobe on the right lower lobe bronchial branches with atelectasis on CT chest   He remained on high-flow nasal cannula and so he was brought to the ICCU for further monitoring  Overnight the patient the patient refused to wear his BiPAP mask unless he was allowed to eat  The patient was allowed to eat a sandwich and later wore the BiPAP mask for around 6 hours overnight     ---------------------------------------------------------------------------------------  SUBJECTIVE  This morning the patient was seen and examined at bedside  He was on 12 L nasal cannula  Overall he was feeling well and had no acute complaints  He requested that he would like to speak with a  regarding his living situation  Review of Systems   Constitutional: Negative for activity change, appetite change, chills, fatigue and fever     HENT: Negative for congestion, ear discharge, ear pain, hearing loss, sinus pain, sore throat, tinnitus and trouble swallowing  Eyes: Negative for pain and visual disturbance  Respiratory: Positive for cough  Negative for chest tightness, shortness of breath and wheezing  Cardiovascular: Negative for chest pain, palpitations and leg swelling  Gastrointestinal: Negative for abdominal distention, abdominal pain, anal bleeding, blood in stool, constipation, diarrhea, nausea and vomiting  Endocrine: Negative for cold intolerance and heat intolerance  Genitourinary: Negative for difficulty urinating, dysuria and frequency  Musculoskeletal: Negative for arthralgias and myalgias  Skin: Negative for rash  Allergic/Immunologic: Negative for environmental allergies and food allergies  Neurological: Negative for dizziness, light-headedness, numbness and headaches  Hematological: Negative for adenopathy     Psychiatric/Behavioral: Negative for agitation, behavioral problems, confusion and decreased concentration        ---------------------------------------------------------------------------------------  Assessment and Plan:    Neuro:   • Diagnosis:  Toxic metabolic encephalopathy  o Likely secondary to acute hypoxic respiratory failure with possible contraction alkalosis  o Hold diuresis at this time  o Continue BiPAP mask as able  o Monitor ABG closely  o Continue delirium precautions      CV:   • Diagnosis:  AFib  o Plan:  Currently NSR  o Continue home Eliquis and carvedilol  o Continue telemetry monitoring  • Diagnosis:  CAD  o Plan:  Status post stent placement in 2015  o Stress test negative in May of this year  o Continue aspirin, statin, beta-blocker  • Diagnosis:  HTN  o Plan:  Monitor blood pressure closely  o Continue carvedilol and Lasix  • Diagnosis:  HLD  o Plan:  Continue statin  • Diagnosis:  Acute on chronic diastolic heart failure  o Plan:  Continue intermittent diuresis with furosemide as needed (home dose 40 mg po daily) -> currently on hold in setting of contraction alkalosis  o 25g albumin ordered  o Continue fluid restriction and sodium restriction  o Monitor I's and O's and daily weights      Pulm:  • Diagnosis:  Pneumonia  o Plan: CXR showed some concern for pneumonia  o CT chest showed increase in right pleural effusion with new small left effusion, new near complete occlusion of right middle lobe and right lower lobe bronchial branches with atelectasis and new small perihepatic ascites  o Follow-up blood culture, MRSA culture and urine culture  - Blood and urine culture positive for Pseudomonas -> follow-up sensitivities  - Will continue cefepime and discontinue vancomycin  o Monitor WBC count and fever curve  • Diagnosis:  Lung adenocarcinoma  o Plan: s/p resection and radiation therapy in the past  • Diagnosis:  COPD  o Plan:  History of COPD on 2 L NC at baseline  o Continue nebulizers with Xopenex and Atrovent  - Will add Perforomist and budesonide b i d   o Continue to wean supplemental oxygen as able  • Diagnosis:  Acute on chronic respiratory failure with hypoxia and hypercapnia  o Plan:  Likely secondary to pneumonia versus CHF exacerbation versus COPD exacerbation  o Some concern for possible contraction alkalosis -> holding diuresis at this time  o BL 2 L NC -> currently on mid flow -> wean supplemental oxygen as able  o Continue plan for pneumonia as above  o Monitor ABG  o Continue BIPAP as able  o Continue intermittent diuresis as needed  o Monitor I's and O's and daily weights  o Wean supplemental oxygen as able  o Monitor respiratory status closely      GI:   • Diagnosis:  No active issues      :   • Diagnosis:  Stage IIIB CKD  o Plan:  Baseline creatinine appears to be from 1 8-2  o On admission creatinine was at baseline at 2 01  o Monitor BMP closely  o Avoid nephrotoxic medications      F/E/N:   • Plan:  Monitor daily BMP      Heme/Onc:   • Diagnosis:  Lung adenocarcinoma  o Plan: s/p resection and radiation therapy in the past      Endo:   • Diagnosis:  Type 2 diabetes mellitus  o Plan:  Home insulin regimen includes 30 units of long-acting at bedtime and 10 units short-acting with meals  o Will continue 40 units Lantus at bedtime and sliding scale insulin  o Will add mealtime Humalog 5 units t i d  with meals  o Monitor blood sugars closely      ID:   • Diagnosis:  Sepsis  o Plan:  Patient presented with leukocytosis and tachypnea  o Sources likely secondary to pneumonia vs UTI  - Blood and urine cultures growing Pseudomonas  o Continue IV antibiotics with cefepime  o Monitor WBC count and fever curve  o Follow-up culture sensitivities  o Case management following regarding possible need for long-term placement -> patient reportedly faces of action due to hoarding behaviors  MSK/Skin:   o Diagnosis:  PT/OT consulted  - OOB when able  - Frequent turning and repositioning    Patient appropriate for transfer out of the ICU today?: No  Disposition: Continue Stepdown Level 1 level of care   Code Status: Level 1 - Full Code  ---------------------------------------------------------------------------------------  ICU CORE MEASURES    Prophylaxis   VTE Pharmacologic Prophylaxis: Apixaban (Eliquis)  VTE Mechanical Prophylaxis: sequential compression device  Stress Ulcer Prophylaxis: Pantoprazole PO    ABCDE Protocol (if indicated)  Plan to perform spontaneous awakening trial today? Not applicable  Plan to perform spontaneous breathing trial today? Not applicable  Obvious barriers to extubation? Not applicable  CAM-ICU: Negative    Invasive Devices Review  Invasive Devices     Peripheral Intravenous Line  Duration           Peripheral IV 11/20/22 Right Antecubital 1 day    Peripheral IV 11/21/22 Dorsal (posterior); Right Hand 1 day          Drain  Duration           Urethral Catheter Straight-tip 18 Fr  1 day              Can any invasive devices be discontinued today?  Not applicable  ---------------------------------------------------------------------------------------  OBJECTIVE    Vitals   Vitals:    22 0315 22 0335 22 0405 22 0435   BP: 105/54 107/56 96/58 100/57   Pulse: 84 86 86 86   Resp:       Temp:       TempSrc:       SpO2: 99% 99% 100% 100%   Weight:       Height:         Temp (24hrs), Av 6 °F (37 6 °C), Min:98 9 °F (37 2 °C), Max:100 6 °F (38 1 °C)  Current: Temperature: (!) 100 6 °F (38 1 °C)      Respiratory:  SpO2: SpO2: 100 %  Nasal Cannula O2 Flow Rate (L/min): 55 L/min    Invasive/non-invasive ventilation settings   Respiratory    Lab Data (Last 4 hours)       0621            pH, Arterial       7 369             pCO2, Arterial       59 3             pO2, Arterial       123 4             HCO3, Arterial       33 4             Base Excess, Arterial       7 1                  O2/Vent Data     None                Physical Exam  Constitutional:       Appearance: He is well-developed  He is obese  HENT:      Head: Normocephalic and atraumatic  Nose: Nose normal    Eyes:      General:         Right eye: No discharge  Left eye: No discharge  Conjunctiva/sclera: Conjunctivae normal       Pupils: Pupils are equal, round, and reactive to light  Neck:      Thyroid: No thyromegaly  Cardiovascular:      Rate and Rhythm: Normal rate and regular rhythm  Heart sounds: Normal heart sounds  No murmur heard  No friction rub  No gallop  Pulmonary:      Effort: No respiratory distress  Breath sounds: Normal breath sounds  No stridor  No wheezing or rales  Comments: [address]  Chest:      Chest wall: No tenderness  Abdominal:      General: Bowel sounds are normal  There is no distension  Palpations: Abdomen is soft  There is no mass  Tenderness: There is no abdominal tenderness  There is no guarding or rebound  Musculoskeletal:         General: No tenderness or deformity        Right lower leg: Edema present  Left lower leg: Edema present  Lymphadenopathy:      Cervical: No cervical adenopathy  Skin:     General: Skin is warm and dry  Neurological:      Mental Status: He is alert  Laboratory and Diagnostics:  Results from last 7 days   Lab Units 11/22/22  0521 11/21/22  0636 11/20/22  1102   WBC Thousand/uL 12 99* 17 90* 17 88*   HEMOGLOBIN g/dL 7 1* 7 5* 8 7*   HEMATOCRIT % 24 8* 26 0* 29 6*   PLATELETS Thousands/uL 132* 153 183   NEUTROS PCT %  --  89* 90*   MONOS PCT %  --  7 8     Results from last 7 days   Lab Units 11/22/22  0521 11/21/22  0636 11/20/22  1102   SODIUM mmol/L 137 138 137   POTASSIUM mmol/L 4 2 4 0 4 1   CHLORIDE mmol/L 102 101 102   CO2 mmol/L 31 33* 29   ANION GAP mmol/L 4 4 6   BUN mg/dL 46* 38* 31*   CREATININE mg/dL 2 72* 2 32* 2 01*   CALCIUM mg/dL 8 3 8 3 8 4   GLUCOSE RANDOM mg/dL 258* 257* 375*   ALT U/L  --  10* 12   AST U/L  --  9 7   ALK PHOS U/L  --  69 84   ALBUMIN g/dL  --  2 2* 2 7*   TOTAL BILIRUBIN mg/dL  --  0 42 0 47     Results from last 7 days   Lab Units 11/22/22  0521 11/21/22  0636 11/20/22  2241   MAGNESIUM mg/dL 2 5 2 3  --    PHOSPHORUS mg/dL 3 8 4 3* 4 4*      Results from last 7 days   Lab Units 11/20/22  1235   INR  1 08   PTT seconds 26          Results from last 7 days   Lab Units 11/20/22  2243 11/20/22  1235   LACTIC ACID mmol/L 1 5 2 0     ABG:  Results from last 7 days   Lab Units 11/22/22  0621   PH ART  7 369   PCO2 ART mm Hg 59 3*   PO2 ART mm Hg 123 4   HCO3 ART mmol/L 33 4*   BASE EXC ART mmol/L 7 1   ABG SOURCE  Radial, Left     VBG:  Results from last 7 days   Lab Units 11/22/22  0621 11/21/22  1553 11/20/22  2329   PH MAYE   --   --  7 288*   PCO2 MAYE mm Hg  --   --  67 2*   PO2 MAYE mm Hg  --   --  54 0*   HCO3 MAYE mmol/L  --   --  31 4*   BASE EXC MAYE mmol/L  --   --  4 0   ABG SOURCE  Radial, Left   < >  --     < > = values in this interval not displayed       Results from last 7 days   Lab Units 11/21/22  0679 11/20/22  1102   PROCALCITONIN ng/ml 2 67* 0 26*       Micro  Results from last 7 days   Lab Units 11/20/22  1322 11/20/22  1256 11/20/22  1245 11/20/22  1220   GRAM STAIN RESULT   --   --  Gram negative rods* Gram negative rods*   URINE CULTURE  60,000-69,000 cfu/ml Pseudomonas aeruginosa*  --   --   --    MRSA CULTURE ONLY   --  Culture results to follow  --   --        Imaging: I have personally reviewed pertinent reports  Intake and Output  I/O       11/19 0701 11/20 0700 11/20 0701 11/21 0700    I V  (mL/kg)  214 3 (2)    IV Piggyback  600    Total Intake(mL/kg)  814 3 (7 5)    Urine (mL/kg/hr)  1650    Total Output  1650    Net  -835 7                Height and Weights   Height: 6' (182 9 cm)  IBW (Ideal Body Weight): 77 6 kg  Body mass index is 33 52 kg/m²  Weight (last 2 days)     Date/Time Weight    11/21/22 0600 112 (247 14)    11/20/22 1048 109 (240)            Nutrition       Diet Orders   (From admission, onward)             Start     Ordered    11/22/22 0716  Diet Mani/CHO Controlled; Consistent Carbohydrate Diet Level 2 (5 carb servings/75 grams CHO/meal)  Diet effective now        References:    Nutrtion Support Algorithm Enteral vs  Parenteral   Question Answer Comment   Diet Type Mani/CHO Controlled    Mani/CHO Controlled Consistent Carbohydrate Diet Level 2 (5 carb servings/75 grams CHO/meal)    RD to adjust diet per protocol?  Yes        11/22/22 0715                  Active Medications  Scheduled Meds:  Current Facility-Administered Medications   Medication Dose Route Frequency Provider Last Rate   • apixaban  5 mg Oral BID Deyvi Banai, DO     • aspirin  81 mg Oral Daily Deyvi Banai, DO     • budesonide  0 5 mg Nebulization Q12H Valentina Dickens DO     • carvedilol  6 25 mg Oral BID With Meals Deyvi Banai, DO     • cefepime  2,000 mg Intravenous Q12H Deyvi Banai, DO 2,000 mg (11/21/22 2106)   • dexmedetomidine  0 1-0 7 mcg/kg/hr Intravenous Titrated Ibis Suarez MD Stopped (11/21/22 2300)   • fluticasone  1 puff Inhalation Q12H [address] Deyvi Rosario, DO     • formoterol  20 mcg Nebulization Q12H Elmo Baxter DO     • gabapentin  100 mg Oral TID Deyvi Rosario DO     • insulin glargine  40 Units Subcutaneous HS Deyvi Rosario DO     • insulin lispro  1-6 Units Subcutaneous TID AC Ricky Bowen MD     • insulin lispro  1-6 Units Subcutaneous HS Ricky Bowen MD     • insulin lispro  5 Units Subcutaneous Daily With Breakfast Ricky Bowen MD     • insulin lispro  5 Units Subcutaneous Daily With Lunch Ricky Bowen MD     • insulin lispro  5 Units Subcutaneous Daily With Arturo Crook MD     • ipratropium  0 5 mg Nebulization Q6H Ugo Monge PA-C     • isosorbide mononitrate  30 mg Oral Daily Deyvi Rosario DO     • levalbuterol  1 25 mg Nebulization Q6H Ugo Monge PA-C     • ondansetron  4 mg Intravenous Q6H PRN Deyvi Rosario DO     • pantoprazole  40 mg Oral BID AC Deyvi Rosario DO     • pravastatin  80 mg Oral Daily With [employer], DO     • senna-docusate sodium  1 tablet Oral HS Deyvi Rosario DO     • tamsulosin  0 4 mg Oral Daily With [employer], DO       Continuous Infusions:  dexmedetomidine, 0 1-0 7 mcg/kg/hr, Last Rate: Stopped (11/21/22 2300)      PRN Meds:   ondansetron, 4 mg, Q6H PRN        Allergies   Allergies   Allergen Reactions   • Crestor [Rosuvastatin] Other (See Comments)     Unknown     • Lisinopril GI Intolerance, Dizziness and Abdominal Pain   • Metformin GI Intolerance     ---------------------------------------------------------------------------------------  Advance Directive and Living Will:      Power of :    POLST:    ---------------------------------------------------------------------------------------  Care Time Delivered:   Upon my evaluation, this patient had a high probability of imminent or life-threatening deterioration due to Acute hypoxic respiratory failure, which required my direct attention, intervention, and personal management  I have personally provided 60 minutes of critical care time, exclusive of procedures, teaching, family meetings, and any prior time recorded by providers other than myself  Ricky Bowen MD      Portions of the record may have been created with voice recognition software  Occasional wrong word or "sound a like" substitutions may have occurred due to the inherent limitations of voice recognition software    Read the chart carefully and recognize, using context, where substitutions have occurred

## 2022-11-22 NOTE — ASSESSMENT & PLAN NOTE
· Likely secondary to pneumonia vs UTI  · Blood in urine cultures positive for Pseudomonas  · Repeat blood culture showed no growth  · Cefepime was transition to ceftazidime in setting of persistent AMS  · ID following  · Trend WBC count and fever curve

## 2022-11-22 NOTE — ASSESSMENT & PLAN NOTE
Wt Readings from Last 3 Encounters:   11/26/22 104 kg (229 lb 6 4 oz)   11/09/22 103 kg (227 lb 3 2 oz)   11/01/22 97 4 kg (214 lb 11 7 oz)       · Patient with documented dHF with pEF  · Last echocardiogram from 5/22: Left ventricular cavity size is normal  Wall thickness is increased  The left ventricular ejection fraction is 60%   Systolic function is normal   Wall motion is normal  Diastolic function is normal   · Continue Bumex 1 mg BID, likely will transition to oral  · Monitor I's and O's and daily weights  · Continue to monitor on telemetry

## 2022-11-22 NOTE — PROGRESS NOTES
INTERNAL MEDICINE RESIDENCY ICU TRANSFER NOTE     Name: Sd Peralta    Age & Sex: 78 y o  male   MRN: [id number]  [facility]   Encounter: [facility] Stay Days: 2    Accepting team:   Code Status: Level 1 - Full Code  Disposition: Patient requires Med/Surg    ASSESSMENT/PLAN     Principal Problem:    Sepsis ([facility] )  Active Problems:    COPD (chronic obstructive pulmonary disease) ([facility] )    HLD (hyperlipidemia)    Type 2 diabetes mellitus with hyperglycemia, with long-term current use of insulin (HCC)    Pleural effusion on right    Essential hypertension    CAD (coronary artery disease)    Acute on chronic respiratory failure with hypoxia and hypercapnia ([facility] )    Adenocarcinoma of lung ([facility] )    Stage 3b chronic kidney disease (CKD) ([facility] )    A-fib ([place] [id number] )    Anemia    Pneumonia    Acute on chronic diastolic heart failure ([facility] )      Acute on chronic diastolic heart failure ([facility] [address] )  Assessment & Plan  Wt Readings from Last 3 Encounters:   11/21/22 112 kg (247 lb 2 2 oz)   11/09/22 103 kg (227 lb 3 2 oz)   11/01/22 97 4 kg (214 lb 11 7 oz)       · Patient with documented dHF with pEF  · Last echocardiogram from 5/22: Left ventricular cavity size is normal  Wall thickness is increased  The left ventricular ejection fraction is 60%  Systolic function is normal   Wall motion is normal  Diastolic function is normal   · Lasix currently on hold -> consider restarting home Lasix 40 mg p o   Daily  · Continue to monitor on telemetry       Pneumonia  Assessment & Plan  · CT imaging of his chest showed a new near complete occlusion of middle lobe and right lower lobe bronchial branches with atelectasis   · Blood in urine cultures positive for Pseudomonas  · Follow-up culture sensitivities  · Started on Cefepime and Vancomycin in the ED -> vancomycin discontinued and patient continued on cefepime following culture results  · Trend WBC count and fever curve    Anemia  Assessment & Plan  · Likely secondary to CKD verses iron deficiency anemia  · Follow-up iron studies  · Consider restarting iron supplementation following iron study results    A-fib ([place] [address] )  Assessment & Plan  · Continue home Eliquis for [facility]  · Continue home coreg   · Monitor on telemetry     Stage 3b chronic kidney disease (CKD) ([place] [address] )  Assessment & Plan  Lab Results   Component Value Date    EGFR 21 11/22/2022    EGFR 26 11/21/2022    EGFR 31 11/20/2022    CREATININE 2 72 (H) 11/22/2022    CREATININE 2 32 (H) 11/21/2022    CREATININE 2 01 (H) 11/20/2022     · Baseline creatine 1 8-2  · Creatinine on admission 2 01  · Continue to trend creatine  · Avoid nephrotoxic medications    Adenocarcinoma of lung ([place] [address] )  Assessment & Plan  · Patient with history of adenocarcinoma status post resection and radiation therapy    Acute on chronic respiratory failure with hypoxia and hypercapnia (HCC)  Assessment & Plan  · Multifactorial in the setting of CHF exacerbation, possible pneumonia, history severe COPD suspected JACQUELINE/OHS, Lung CA s/p SBRT  · Patient on 2L NC at baseline  · S/p HFNC and BIPAP  · Currently saturating well on 3L NC  · Can trial Bipap HS and PRN as needed   · Started on Cefepime and Vancomyin in the ED for presumed pneumonia  · CT imaging of his chest showed a ew near complete occlusion of middle lobe and right lower lobe bronchial branches with atelectasis   · Blood in urine cultures growing Pseudomonas -> vancomycin DC'd and continued on cefepime  · Continue nebulizers     CAD (coronary artery disease)  Assessment & Plan  · History of CAD with stenting in 2015  · Stent to D1, mid RCA and distal RCA  · Continue GDMT  · ASA  · Coreg  · Statin    Essential hypertension  Assessment & Plan  · Continue home coreg and imdur  · Home Lasix currently on hold   · Monitor blood pressure    Pleural effusion on right  Assessment & Plan  · Patient with documented chronic recurrent pleural effusion on the right   · S/p Thoracentesis on 11/08/22  · Cytology: Minute cluster of atypical cells noted in cellblock material only  · Atypical cluster is favored to represent contamination during specimen processing; however, given the patient's history of lung adenocarcinoma, involvement by malignancy cannot be definitively ruled out  · Consider repeat thoracentesis with cytology     Type 2 diabetes mellitus with hyperglycemia, with long-term current use of insulin [facility])  Assessment & Plan  Lab Results   Component Value Date    HGBA1C 9 7 (H) 10/10/2022       Recent Labs     11/21/22  1546 11/21/22  2102 11/22/22  0527 11/22/22  1145   POCGLU 215* 238* 274* 241*       Blood Sugar Average: Last 72 hrs:  (P) 283 [id number]     · Continue lantus 40 units HS (at home patient takes 30 units HS)  · Continue home mealtime insulin 10 units t i d  With meals  · ISS  · Goal -180    HLD (hyperlipidemia)  Assessment & Plan  · Continue home statin therapy     COPD (chronic obstructive pulmonary disease) ([facility])  Assessment & Plan  · Severe COPD FEV1 33%  · Patient on chronic supplemental oxygen 2L NC  · Continue Flovent  · Continue Xopenex and atrovent Q6  · Continue budesonide and Permafrost    * Sepsis ([address] )  Assessment & Plan  · Likely secondary to pneumonia vs UTI  · Blood in urine cultures positive for Pseudomonas  · Follow-up culture sensitivities  · Follow-up repeat blood cultures  · Started on Cefepime and Vancomycin in the ED -> vancomycin discontinued and patient continued on cefepime following culture results  · Trend WBC count and fever curve      VTE Pharmacologic Prophylaxis: Eliquis  VTE Mechanical Prophylaxis: sequential compression device    HOSPITAL COURSE     Patient is a 79-year-old male with a past medical history of severe COPD, diastolic CHF, atrial fibrillation on Eliquis, lung cancer status post SBRT, type 2 diabetes, stage 3b CKD, GERD, CAD and hypertension who presented to the emergency department 11/20 complaining of worsening shortness of breath    His worsening hypoxic respiratory failure was thought to be secondary to acute CHF exacerbation versus COPD exacerbation versus possible underlying pneumonia  He was placed on high-flow nasal cannula in the emergency department as well as a nitro drip for elevated blood pressure and Lasix  The patient met SIRS criteria and was started on antibiotics and blood cultures were drawn  He was also found to have a new near complete occlusion of the middle lobe on the right lower lobe bronchial branches with atelectasis on CT chest   He remained on high-flow nasal cannula and so he was brought to the ICCU for further monitoring  The ICCU the patient was placed on BiPAP due to worsening hypoxic and hypercapnic respiratory failure  Blood and urine cultures were positive for Pseudomonas and he was continued on cefepime and vancomycin was discontinued  The patient's clinical status slowly improved and he was eventually weaned off of BiPAP and down to 3 L nasal cannula  He was later deemed medically stable for downgrade to med surge  SUBJECTIVE     Patient was seen and examined at bedside  He had no acute events overnight  He tolerated the BiPAP well overnight  Today the patient was resting comfortably on 3 L nasal cannula  Overall he was feeling well and he had no acute complaints  OBJECTIVE     Vitals:    11/22/22 1015 11/22/22 1045 11/22/22 1115 11/22/22 1500   BP: 127/63 115/56 124/56    Pulse: 88 90 90    Resp:       Temp:    98 7 °F (37 1 °C)   TempSrc:    Oral   SpO2: 99% 94% 100%    Weight:       Height:         I/O last 24 hours: In: 701 1 [P O :600; I V :1 1; IV Piggyback:100]  Out: 950 [Urine:950]    Physical Exam  Constitutional:       Appearance: He is well-developed and well-nourished  He is obese  HENT:      Head: Normocephalic and atraumatic  Nose: Nose normal       Mouth/Throat:      Mouth: Oropharynx is clear and moist    Eyes:      General:         Right eye: No discharge           Left eye: No discharge  Conjunctiva/sclera: Conjunctivae normal       Pupils: Pupils are equal, round, and reactive to light  Neck:      Thyroid: No thyromegaly  Cardiovascular:      Rate and Rhythm: Normal rate and regular rhythm  Heart sounds: Normal heart sounds  No murmur heard  No friction rub  No gallop  Pulmonary:      Effort: Pulmonary effort is normal  No respiratory distress  Breath sounds: Normal breath sounds  No stridor  No wheezing or rales  Comments: 3L NC  Chest:      Chest wall: No tenderness  Abdominal:      General: Bowel sounds are normal  There is no distension  Palpations: Abdomen is soft  There is no mass  Tenderness: There is no abdominal tenderness  There is no guarding or rebound  Musculoskeletal:         General: No tenderness or deformity  Right lower leg: Edema present  Left lower leg: Edema present  Lymphadenopathy:      Cervical: No cervical adenopathy  Skin:     General: Skin is warm and dry  Neurological:      Mental Status: He is alert and oriented to person, place, and time  Psychiatric:         Mood and Affect: Mood and affect and mood normal          Behavior: Behavior normal          Thought Content: Thought content normal          Judgment: Judgment normal        LABORATORY DATA     Labs: I have personally reviewed pertinent reports      Results from last 7 days   Lab Units 11/22/22  0521 11/21/22  0636 11/20/22  1102   WBC Thousand/uL 12 99* 17 90* 17 88*   HEMOGLOBIN g/dL 7 1* 7 5* 8 7*   HEMATOCRIT % 24 8* 26 0* 29 6*   PLATELETS Thousands/uL 132* 153 183   NEUTROS PCT %  --  89* 90*   MONOS PCT %  --  7 8      Results from last 7 days   Lab Units 11/22/22  0521 11/21/22  0636 11/20/22  1102   POTASSIUM mmol/L 4 2 4 0 4 1   CHLORIDE mmol/L 102 101 102   CO2 mmol/L 31 33* 29   BUN mg/dL 46* 38* 31*   CREATININE mg/dL 2 72* 2 32* 2 01*   CALCIUM mg/dL 8 3 8 3 8 4   ALK PHOS U/L  --  69 84   ALT U/L  --  10* 12   AST U/L  --  9 7     Results from last 7 days   Lab Units 11/22/22  0521 11/21/22  0636   MAGNESIUM mg/dL 2 5 2 3     Results from last 7 days   Lab Units 11/22/22  0521 11/21/22  0636 11/20/22  2241   PHOSPHORUS mg/dL 3 8 4 3* 4 4*      Results from last 7 days   Lab Units 11/20/22  1235   INR  1 08   PTT seconds 26     Results from last 7 days   Lab Units 11/20/22  2243   LACTIC ACID mmol/L 1 5         Micro:  Lab Results   Component Value Date    BLOODCX Pseudomonas aeruginosa (A) 11/20/2022    BLOODCX Pseudomonas aeruginosa (A) 11/20/2022    BLOODCX No Growth After 5 Days  10/26/2022    BLOODCX No Growth After 5 Days  10/26/2022    URINECX 60,000-69,000 cfu/ml Pseudomonas aeruginosa (A) 11/20/2022    URINECX >100,000 cfu/ml Klebsiella variicola (A) 10/26/2022    URINECX >100,000 cfu/ml Pseudomonas aeruginosa (A) 06/15/2022    SPUTUMCULTUR Few Colonies of Pseudomonas aeruginosa (A) 10/26/2022    SPUTUMCULTUR 1+ Growth of 10/26/2022    SPUTUMCULTUR 3+ Growth of 06/11/2022     IMAGING & DIAGNOSTIC TESTING     Imaging: I have personally reviewed pertinent reports  XR chest 1 view portable    Result Date: 11/20/2022  Impression: Increased size right pleural effusion    Workstation performed: [id number]     CT chest wo contrast    Result Date: 11/20/2022  Impression: 1  Increase in moderate right pleural effusion with new small left effusion  2   New near complete occlusion of middle lobe and right lower lobe bronchial branches with atelectasis  3  New small perihepatic ascites  The study was marked in [facility] for immediate notification  Workstation performed: [id number]     XR chest portable ICU    Result Date: 11/21/2022  Impression: Moderate right pleural effusion with associated right lung base opacity  Workstation performed: [id number]     EKG, Pathology, and Other Studies: I have personally reviewed pertinent reports       ALLERGIES     Allergies   Allergen Reactions   • Crestor [Rosuvastatin] Other (See Comments) Unknown     • Lisinopril GI Intolerance, Dizziness and Abdominal Pain   • Metformin GI Intolerance     MEDICATIONS     Current Facility-Administered Medications   Medication Dose Route Frequency Provider Last Rate   • apixaban  5 mg Oral BID Deyvi Rosario, DO     • aspirin  81 mg Oral Daily Deyvi Rosario, DO     • budesonide  0 5 mg Nebulization Q12H Rao Mckenzie, DO     • carvedilol  6 25 mg Oral BID With Meals Deyvi Rosario, DO     • cefepime  2,000 mg Intravenous Q12H Deyvi Rosario DO 2,000 mg (11/22/22 1983)   • dexmedetomidine  0 1-0 7 mcg/kg/hr Intravenous Titrated Siria Sloan MD Stopped (11/21/22 2300)   • fluticasone  1 puff Inhalation Q12H [address] Deyvi Rosario, DO     • formoterol  20 mcg Nebulization Q12H Rao Mckenzie, DO     • gabapentin  100 mg Oral TID Deyvi Rosario, DO     • insulin glargine  40 Units Subcutaneous HS Deyvi Rosario DO     • insulin lispro  1-6 Units Subcutaneous TID AC Laron Domínguez MD     • insulin lispro  1-6 Units Subcutaneous  Laron Domínguez MD     • [START ON 11/23/2022] insulin lispro  10 Units Subcutaneous Daily With Breakfast Rao Mckenzie, DO     • [START ON 11/23/2022] insulin lispro  10 Units Subcutaneous Daily With BON [facility], DO     • insulin lispro  10 Units Subcutaneous Daily With [employer], DO     • ipratropium  0 5 mg Nebulization Q6H Danielle Gilbert PA-C     • isosorbide mononitrate  30 mg Oral Daily Deyvi Rosario DO     • levalbuterol  1 25 mg Nebulization Q6H Danielle Gilbert PA-C     • ondansetron  4 mg Intravenous Q6H PRN Deyvi Rosario, DO     • pantoprazole  40 mg Oral BID AC Deyvi Rosario DO     • pravastatin  80 mg Oral Daily With [employer], DO     • senna-docusate sodium  1 tablet Oral HS Deyvi Rosario, DO     • tamsulosin  0 4 mg Oral Daily With [employer], DO       dexmedetomidine, 0 1-0 7 mcg/kg/hr, Last Rate: Stopped (11/21/22 2300)      ondansetron, 4 mg, Q6H PRN        Portions of the record may have been created with voice recognition software  Occasional wrong word or "sound a like" substitutions may have occurred due to the inherent limitations of voice recognition software    Read the chart carefully and recognize, using context, where substitutions have occurred     ==  Catarina Bruce MD  [address]  Internal Medicine Residency PGY-3

## 2022-11-22 NOTE — PHYSICAL THERAPY NOTE
PHYSICAL THERAPY EVALUATION  NAME:  Montez Cantu  DATE: 11/22/22    AGE:   78 y o  Mrn:   [id number]  ADMIT DX:  Pneumonia [J18 9]  Acute exacerbation of CHF (congestive heart failure) (HCC) [I50 9]  Type 2 diabetes mellitus with hyperglycemia, with long-term current use of insulin (HCC) [E11 65, Z79 4]    Past Medical History:   Diagnosis Date    Abdominal pain     MARANDA (acute kidney injury) ([facility] ) 6/19/2018    Cardiac disease     CHF (congestive heart failure) (HCC)     COPD, severe (HCC)     Coronary artery disease     DDD (degenerative disc disease), lumbar 9/1/2020    Diabetes mellitus (HCC)     Dyspnea     GERD (gastroesophageal reflux disease)     Hyperlipidemia     Hypertension     MI (myocardial infarction) ([facility] )     with 3 stents    Nodule of apex of right lung     JACQUELINE (obstructive sleep apnea)     Prostate cancer [facility])      Past Surgical History:   Procedure Laterality Date    ABDOMINAL SURGERY      exploratory    ANGIOPLASTY      3 stents    APPENDECTOMY      COLONOSCOPY  2015    CT NEEDLE BIOPSY LUNG  11/3/2020    ESOPHAGOGASTRODUODENOSCOPY N/A 10/2/2017    Procedure: ESOPHAGOGASTRODUODENOSCOPY (EGD); Surgeon: Patricia Cronin MD;  Location: [facility]; Service: Gastroenterology    IR THORACENTESIS  11/3/2020    IR THORACENTESIS  11/8/2022    KNEE CARTILAGE SURGERY      OTHER SURGICAL HISTORY      stent placement    PROSTATE SURGERY      SKIN GRAFT      Basal cell CA back       Length Of Stay: 2  PHYSICAL THERAPY EVALUATION :    11/22/22 1140   PT Last Visit   PT Visit Date 11/22/22   Note Type   Note type Evaluation   Pain Assessment   Pain Assessment Tool 0-10   Pain Score No Pain   Restrictions/Precautions   Weight Bearing Precautions Per Order No   Braces or Orthoses   (none)   Other Precautions Chair Alarm; Bed Alarm;Multiple lines;Telemetry;O2;Fall Risk  (6Lo2)   Home Living   Type of Home House   Home Layout One level;Stairs to enter with rails  (1STE)   Bathroom Shower/Tub Walk-in shower [place]  (does not use)   Prior Function   Level of [place] Independent with ADLs; Independent with functional mobility   Lives With Son  (works nights)   Receives Help From Family   IADLs Independent with driving; Independent with meal prep; Independent with medication management   Falls in the last 6 months 1 to 4   Vocational On disability   Cognition   Arousal/Participation Cooperative   Attention Attends with cues to redirect   Orientation Level Oriented to person;Oriented to place;Oriented to situation   Memory Decreased recall of precautions;Decreased recall of recent events   Following Commands Follows one step commands without difficulty   RLE Assessment   RLE Assessment WFL   LLE Assessment   LLE Assessment WFL   Bed Mobility   Supine to Sit 4  Minimal assistance   Additional items HOB elevated; Increased time required;Verbal cues   Transfers   Sit to Stand 4  Minimal assistance   Additional items Assist x 1; Increased time required;Verbal cues   Stand to Sit 4  Minimal assistance   Additional items Assist x 1; Increased time required;Verbal cues   Ambulation/Elevation   Gait pattern Excessively slow;Decreased foot clearance   Gait Assistance 4  Minimal assist   Assistive Device Rolling walker   Distance 4'   Balance   Static Sitting Fair   Dynamic Sitting Fair -   Static Standing Poor +   Dynamic Standing Poor +   Ambulatory Poor +   Endurance Deficit   Endurance Deficit Yes   Endurance Deficit Description SOB WINTERS w/ hypoxia on 6L-   Activity Tolerance   Activity Tolerance Patient limited by fatigue;Treatment limited secondary to medical complications (Comment)   Medical Staff Made Aware care coordination w/OT RN/ CM   Nurse Made Aware cleared for session   AssessmentPt is 78 y o  male seen for PT evaluation s/p admit to [facility] on 11/20/2022  Pt presenting w/ shortness of breath worsening leg swelling  Pt required bipap on admission    CT chest= Moderate right pleural effusion with associated right lung base opacity  Current dx/ problem list includes: pneumonia ; sepsis; afib;pt   has a past medical history of Abdominal pain, MARANDA (acute kidney injury) ([facility] ) (6/19/2018), Cardiac disease, CHF (congestive heart failure) ([place] [address] ), COPD, severe ([place] [address] ), Coronary artery disease, DDD (degenerative disc disease), lumbar (9/1/2020), Diabetes mellitus ([place] [address] ), Dyspnea, GERD (gastroesophageal reflux disease), Hyperlipidemia, Hypertension, MI (myocardial infarction) ([place] [address] ), Nodule of apex of right lung, JACQUELINE (obstructive sleep apnea), and Prostate cancer ([place] [address] )       Due to acute medical issues, ongoing medical workup for primary dx; pain, fall risk, increased reliance on more restrictive AD compared to baseline;  decreased activity tolerance compared to baseline, increased assistance needed from caregiver at current time, continuous telemetry monitoring, multiple lines, decline in overall functional mobility status; health management issues; social barriers to safe d/c home;  note unstable clinical picture (high complexity)   Prior to admission, pt was living w/ son in a home that they cannot return to- 1 SH w/ 1 VINI  Functionally pt was indep and not using AD; reports driving and I w/ ADL's IADL's    Currently pt  is requiring Candida A for bed skills; Candida for functional transfers and Candida for ambulation w/ RW 4' to  Chair on 6L w/ Spo2 dropping to low 80's w/ questionable read on minnie  Pt presents functioning below baseline and currently w/ overall mobility deficits 2* to: decreased LE strength/AROM; limited flexibility;  generalized weakness/ deconditioning; decreased endurance; decreased activity tolerance; decreased coordination; impaired balance; gait deviations; decreased safety awareness; SOB/WINTERS; fatigue; impaired safety and judgement; limited insight into current deficits; bed/ chair alarms; multiple lines; hearing impairment/ visual impairments; unkempt   (Please find additional objective findings from PT assessment regarding body systems outlined above ) Pt will continue to benefit from skilled PT interventions to address stated impairments; to maximize functional potential; for ongoing pt/ family training; and DME needs  PT is currently recommending d/c to inpatient rehab setting when medically cleared for safety and to maximize functional potential prior to d/c home  Prognosis Good   Problem List Decreased strength;Decreased range of motion;Decreased endurance; Impaired balance;Decreased mobility; Decreased coordination; Impaired judgement;Decreased safety awareness   Barriers to Discharge Decreased caregiver support; Inaccessible home environment   Goals   Patient Goals get some rest   STG Expiration Date 12/02/22   Short Term Goal #1 In 14 days pt will complete: 1) Bed mobility skills with S to facilitate safe return to previous living environment and decrease burden on caregivers  2) Functional transfers with S  to facilitate safe return to previous living environment  3) Ambulation with least restrictive AD S 50'x2' without LOB and stable vitals for safe ambulation in home/ community environment  4) Stair training up/ down 2 step/s with 1 rail/s  and Candida for safe access to previous living environment and to increase community access  5) Improve balance by 1 grade in order to decrease fall risk  6) Improve LE strength grades by 1 to increase independence w/ all functional mobility, transfers and gait  7) PT for ongoing pt and family education; DME needs and D/C planning to promote highest level of function in least restrictive environment   PT Treatment Day 0   Plan   Treatment/Interventions ADL retraining;Functional transfer training;LE strengthening/ROM; Elevations; Therapeutic exercise; Endurance training;Cognitive reorientation;Patient/family training;Equipment eval/education; Bed mobility;Gait training;Continued evaluation; Compensatory technique education;Spoke to nursing;Spoke to case management;Spoke to advanced practitioner;OT   PT Frequency 3-5x/wk   Recommendation   PT Discharge Recommendation Post acute rehabilitation services   Equipment Recommended [facility] Recommended Wheeled walker   AM-PAC Basic Mobility Inpatient   Turning in Bed Without Bedrails 3   Lying on Back to Sitting on Edge of Flat Bed 3   Moving Bed to Chair 3   Standing Up From Chair 3   Walk in Room 2   Climb 3-5 Stairs 2   Basic Mobility Inpatient Raw Score 16   Basic Mobility Standardized Score 38 32   Highest Level Of Mobility   -HLM Goal 5: Stand one or more mins   -HLM Achieved 4: Move to chair/commode   Additional Treatment Session   Start Time 1129   End Time 1140   Treatment Assessment Pt seen for skilled PT session following evaluation consisting of instruction in seated therex/ HEP instruction; for increased LE strengthening to assist w/ increasing independence w/ transfers and gait  Pt tolerates therex w/o complaints or increase in pain  Will continue to  benefit from repeated instruction for correct technique and compliance  Exercises   Hip Abduction Sitting;15 reps   Hip Adduction Sitting;15 reps   Knee AROM Long Arc Quad Sitting;15 reps   Ankle Pumps Sitting;15 reps   Marching Sitting;Standing;15 reps   Balance training  sit> stand + 20x march (4 times w/ UE support on RW) seated rest b/t each   End of Consult   Patient Position at End of Consult Bedside chair;Bed/Chair alarm activated; All needs within reach     The patient's AM-PAC Basic Mobility Inpatient Short Form Raw Score is 16  A Raw score of less than or equal to 16 suggests the patient may benefit from discharge to post-acute rehabilitation services  Please also refer to the recommendation of the Physical Therapist for safe discharge planning      Jf Dodd

## 2022-11-22 NOTE — PLAN OF CARE
Problem: PHYSICAL THERAPY ADULT  Goal: Performs mobility at highest level of function for planned discharge setting  See evaluation for individualized goals  Description: Treatment/Interventions: ADL retraining, Functional transfer training, LE strengthening/ROM, Elevations, Therapeutic exercise, Endurance training, Cognitive reorientation, Patient/family training, Equipment eval/education, Bed mobility, Gait training, Continued evaluation, Compensatory technique education, Spoke to nursing, Spoke to case management, Spoke to advanced practitioner, OT  Equipment Recommended: Sue Amaya       See flowsheet documentation for full assessment, interventions and recommendations  Note: Prognosis: Good  Problem List: Decreased strength, Decreased range of motion, Decreased endurance, Impaired balance, Decreased mobility, Decreased coordination, Impaired judgement, Decreased safety awareness     Barriers to Discharge: Decreased caregiver support, Inaccessible home environment   Pt is 78 y o  male seen for PT evaluation s/p [facility] on [facility]/20/2022  Pt presenting w/ shortness of breath worsening leg swelling  Pt required bipap on admission  CT chest= Moderate right pleural effusion with associated right lung base opacity  Current dx/ problem list includes: pneumonia ; sepsis; afib;pt   has a past medical history of Abdominal pain, MARANDA (acute kidney injury) ([facility] ) (6/19/2018), Cardiac disease, CHF (congestive heart failure) ([facility] ), COPD, severe ([address] ), Coronary artery disease, DDD (degenerative disc disease), lumbar (9/1/2020), Diabetes mellitus ([address] ), Dyspnea, GERD (gastroesophageal reflux disease), Hyperlipidemia, Hypertension, MI (myocardial infarction) ([address] ), Nodule of apex of right lung, JACQUELINE (obstructive sleep apnea), and Prostate cancer ([facility] )        Due to acute medical issues, ongoing medical workup for primary dx; pain, fall risk, increased reliance on more restrictive AD compared to baseline; decreased activity tolerance compared to baseline, increased assistance needed from caregiver at current time, continuous telemetry monitoring, multiple lines, decline in overall functional mobility status; health management issues; social barriers to safe d/c home;  note unstable clinical picture (high complexity)   Prior to admission, pt was living w/ son in a home that they cannot return to- 1 SH w/ 1 VINI  Functionally pt was indep and not using AD; reports driving and I w/ ADL's IADL's    Currently pt  is requiring Candida A for bed skills; Candida for functional transfers and Candida for ambulation w/ RW 4' to  Chair on 6L w/ Spo2 dropping to low 80's w/ questionable read on minnie  Pt presents functioning below baseline and currently w/ overall mobility deficits 2* to: decreased LE strength/AROM; limited flexibility;  generalized weakness/ deconditioning; decreased endurance; decreased activity tolerance; decreased coordination; impaired balance; gait deviations; decreased safety awareness; SOB/WINTERS; fatigue; impaired safety and judgement; limited insight into current deficits; bed/ chair alarms; multiple lines; hearing impairment/ visual impairments; unkempt   (Please find additional objective findings from PT assessment regarding body systems outlined above ) Pt will continue to benefit from skilled PT interventions to address stated impairments; to maximize functional potential; for ongoing pt/ family training; and DME needs  PT is currently recommending d/c to inpatient rehab setting when medically cleared for safety and to maximize functional potential prior to d/c home  PT Discharge Recommendation: Post acute rehabilitation services    See flowsheet documentation for full assessment

## 2022-11-22 NOTE — PLAN OF CARE
Problem: OCCUPATIONAL THERAPY ADULT  Goal: Performs self-care activities at highest level of function for planned discharge setting  See evaluation for individualized goals  Description: Treatment Interventions: ADL retraining, Functional transfer training, Endurance training, Cognitive reorientation, Patient/family training, Equipment evaluation/education, Compensatory technique education, Energy conservation, Activityengagement  Equipment Recommended: Bedside commode, Shower/Tub chair with back ($)       See flowsheet documentation for full assessment, interventions and recommendations  Note: Limitation: Decreased ADL status, Decreased endurance, Decreased self-care trans, Decreased high-level ADLs, Decreased Safe judgement during ADL  Prognosis: Fair  Assessment: Pt is a 78 y o  male who was admitted to [facility] on 11/20/2022 with Acute on chronic diastolic heart failure ([facility] )   Pt's problem list also includes PMH of  has a past medical history of Abdominal pain, MARANDA (acute kidney injury) ([facility] ) (6/19/2018), Cardiac disease, CHF (congestive heart failure) ([facility] ), COPD, severe ([facility] [address] ), Coronary artery disease, DDD (degenerative disc disease), lumbar (9/1/2020), Diabetes mellitus ([facility] ), Dyspnea, GERD (gastroesophageal reflux disease), Hyperlipidemia, Hypertension, MI (myocardial infarction) ([facility] ), Nodule of apex of right lung, JACQUELINE (obstructive sleep apnea), and Prostate cancer ([facility] )    At baseline pt was completing I with ADL's/assistance from son with IADL's no AD with functional mobility   Pt lives with his son in a [facility] 1STE  Currently pt requires min/mod a LB (max for LB dressing) for overall ADLS and min a with RW and 2L 02 assistance for line management for functional mobility/transfers   Pt currently presents with impairments in the following categories -steps to enter environment, limited home support, difficulty performing ADLS, difficulty performing IADLS , limited insight into deficits and compliance activity tolerance, endurance, standing balance/tolerance and sitting balance/tolerance  These impairments, as well as pt's fatigue, decreased caregiver support and risk for falls  limit pt's ability to safely engage in all baseline areas of occupation, includingbathing, dressing, toileting, functional mobility/transfers, community mobility, laundry , driving, house maintenance, medication management, meal prep, cleaning, social participation  and leisure activities  The patient's raw score on the AM-PAC Daily Activity inpatient short form is 16, standardized score is 35 96, less than 39 4  Patients at this level are likely to benefit from discharge to post-acute rehabilitation services  Please refer to the recommendation of the Occupational Therapist for safe discharge planning  From OT standpoint, recommend STR upon D/C  OT will continue to follow to address the below stated goals       OT Discharge Recommendation: Post acute rehabilitation services

## 2022-11-22 NOTE — ASSESSMENT & PLAN NOTE
· Severe COPD FEV1 33%  · Patient on chronic supplemental oxygen 2L NC  · Continue Flovent  · Continue Xopenex and atrovent Q6  · Continue budesonide and Permafrost

## 2022-11-22 NOTE — ASSESSMENT & PLAN NOTE
Lab Results   Component Value Date    EGFR 23 11/26/2022    EGFR 20 11/25/2022    EGFR 19 11/24/2022    CREATININE 2 58 (H) 11/26/2022    CREATININE 2 84 (H) 11/25/2022    CREATININE 3 04 (H) 11/24/2022     · Baseline creatine 1 8-2  · Creatinine on admission 2 01  · Continue to trend creatine  · Avoid nephrotoxic medications

## 2022-11-22 NOTE — ASSESSMENT & PLAN NOTE
· Likely secondary to CKD verses iron deficiency anemia  · Follow-up iron studies  · Consider restarting iron supplementation following iron study results

## 2022-11-22 NOTE — ASSESSMENT & PLAN NOTE
· Continue home Eliquis for [facility]  · Continue home coreg   · Monitor on telemetry

## 2022-11-23 ENCOUNTER — APPOINTMENT (INPATIENT)
Dept: RADIOLOGY | Facility: HOSPITAL | Age: 77
End: 2022-11-23
Attending: INTERNAL MEDICINE

## 2022-11-23 LAB
ANION GAP SERPL CALCULATED.3IONS-SCNC: 6 MMOL/L (ref 4–13)
BACTERIA BLD CULT: ABNORMAL
BACTERIA BLD CULT: ABNORMAL
BACTERIA UR CULT: ABNORMAL
BUN SERPL-MCNC: 60 MG/DL (ref 5–25)
CALCIUM SERPL-MCNC: 8.4 MG/DL (ref 8.3–10.1)
CHLORIDE SERPL-SCNC: 101 MMOL/L (ref 96–108)
CO2 SERPL-SCNC: 30 MMOL/L (ref 21–32)
CREAT SERPL-MCNC: 3.26 MG/DL (ref 0.6–1.3)
ERYTHROCYTE [DISTWIDTH] IN BLOOD BY AUTOMATED COUNT: 15.1 % (ref 11.6–15.1)
GFR SERPL CREATININE-BSD FRML MDRD: 17 ML/MIN/1.73SQ M
GLUCOSE SERPL-MCNC: 241 MG/DL (ref 65–140)
GLUCOSE SERPL-MCNC: 248 MG/DL (ref 65–140)
GLUCOSE SERPL-MCNC: 250 MG/DL (ref 65–140)
GLUCOSE SERPL-MCNC: 253 MG/DL (ref 65–140)
GLUCOSE SERPL-MCNC: 302 MG/DL (ref 65–140)
GRAM STN SPEC: ABNORMAL
GRAM STN SPEC: ABNORMAL
HCT VFR BLD AUTO: 26.5 % (ref 36.5–49.3)
HGB BLD-MCNC: 7.8 G/DL (ref 12–17)
MCH RBC QN AUTO: 25.8 PG (ref 26.8–34.3)
MCHC RBC AUTO-ENTMCNC: 29.4 G/DL (ref 31.4–37.4)
MCV RBC AUTO: 88 FL (ref 82–98)
MRSA NOSE QL CULT: NORMAL
P AERUGINOSA DNA BLD POS NAA+NON-PROBE: DETECTED
PLATELET # BLD AUTO: 143 THOUSANDS/UL (ref 149–390)
PMV BLD AUTO: 10.8 FL (ref 8.9–12.7)
POTASSIUM SERPL-SCNC: 4.5 MMOL/L (ref 3.5–5.3)
PROCALCITONIN SERPL-MCNC: 1.37 NG/ML
RBC # BLD AUTO: 3.02 MILLION/UL (ref 3.88–5.62)
SODIUM SERPL-SCNC: 137 MMOL/L (ref 135–147)
WBC # BLD AUTO: 10.58 THOUSAND/UL (ref 4.31–10.16)

## 2022-11-23 PROCEDURE — 0W993ZZ DRAINAGE OF RIGHT PLEURAL CAVITY, PERCUTANEOUS APPROACH: ICD-10-PCS | Performed by: GENERAL PRACTICE

## 2022-11-23 RX ORDER — BUMETANIDE 0.25 MG/ML
1 INJECTION, SOLUTION INTRAMUSCULAR; INTRAVENOUS 2 TIMES DAILY
Status: DISCONTINUED | OUTPATIENT
Start: 2022-11-23 | End: 2022-11-30

## 2022-11-23 RX ADMIN — SENNOSIDES AND DOCUSATE SODIUM 1 TABLET: 8.6; 5 TABLET ORAL at 22:20

## 2022-11-23 RX ADMIN — BUMETANIDE 1 MG: 0.25 INJECTION, SOLUTION INTRAMUSCULAR; INTRAVENOUS at 18:19

## 2022-11-23 RX ADMIN — BUDESONIDE 0.5 MG: 0.5 INHALANT ORAL at 07:35

## 2022-11-23 RX ADMIN — APIXABAN 5 MG: 5 TABLET, FILM COATED ORAL at 08:29

## 2022-11-23 RX ADMIN — CARVEDILOL 6.25 MG: 6.25 TABLET, FILM COATED ORAL at 08:29

## 2022-11-23 RX ADMIN — FORMOTEROL FUMARATE DIHYDRATE 20 MCG: 20 SOLUTION RESPIRATORY (INHALATION) at 07:35

## 2022-11-23 RX ADMIN — CARVEDILOL 6.25 MG: 6.25 TABLET, FILM COATED ORAL at 18:19

## 2022-11-23 RX ADMIN — ASPIRIN 81 MG: 81 TABLET, COATED ORAL at 08:29

## 2022-11-23 RX ADMIN — LEVALBUTEROL HYDROCHLORIDE 1.25 MG: 1.25 SOLUTION, CONCENTRATE RESPIRATORY (INHALATION) at 01:52

## 2022-11-23 RX ADMIN — LEVALBUTEROL HYDROCHLORIDE 1.25 MG: 1.25 SOLUTION, CONCENTRATE RESPIRATORY (INHALATION) at 19:11

## 2022-11-23 RX ADMIN — INSULIN LISPRO 10 UNITS: 100 INJECTION, SOLUTION INTRAVENOUS; SUBCUTANEOUS at 08:30

## 2022-11-23 RX ADMIN — LEVALBUTEROL HYDROCHLORIDE 1.25 MG: 1.25 SOLUTION, CONCENTRATE RESPIRATORY (INHALATION) at 14:15

## 2022-11-23 RX ADMIN — IPRATROPIUM BROMIDE 0.5 MG: 0.5 SOLUTION RESPIRATORY (INHALATION) at 19:11

## 2022-11-23 RX ADMIN — CEFEPIME 2000 MG: 2 INJECTION, POWDER, FOR SOLUTION INTRAVENOUS at 12:43

## 2022-11-23 RX ADMIN — IPRATROPIUM BROMIDE 0.5 MG: 0.5 SOLUTION RESPIRATORY (INHALATION) at 07:35

## 2022-11-23 RX ADMIN — APIXABAN 5 MG: 5 TABLET, FILM COATED ORAL at 18:19

## 2022-11-23 RX ADMIN — PANTOPRAZOLE SODIUM 40 MG: 40 TABLET, DELAYED RELEASE ORAL at 06:37

## 2022-11-23 RX ADMIN — LEVALBUTEROL HYDROCHLORIDE 1.25 MG: 1.25 SOLUTION, CONCENTRATE RESPIRATORY (INHALATION) at 07:35

## 2022-11-23 RX ADMIN — INSULIN LISPRO 3 UNITS: 100 INJECTION, SOLUTION INTRAVENOUS; SUBCUTANEOUS at 18:18

## 2022-11-23 RX ADMIN — INSULIN LISPRO 3 UNITS: 100 INJECTION, SOLUTION INTRAVENOUS; SUBCUTANEOUS at 08:30

## 2022-11-23 RX ADMIN — INSULIN LISPRO 10 UNITS: 100 INJECTION, SOLUTION INTRAVENOUS; SUBCUTANEOUS at 12:43

## 2022-11-23 RX ADMIN — IPRATROPIUM BROMIDE 0.5 MG: 0.5 SOLUTION RESPIRATORY (INHALATION) at 01:52

## 2022-11-23 RX ADMIN — GABAPENTIN 100 MG: 100 CAPSULE ORAL at 22:20

## 2022-11-23 RX ADMIN — ISOSORBIDE MONONITRATE 30 MG: 30 TABLET, EXTENDED RELEASE ORAL at 08:29

## 2022-11-23 RX ADMIN — INSULIN GLARGINE 40 UNITS: 100 INJECTION, SOLUTION SUBCUTANEOUS at 22:27

## 2022-11-23 RX ADMIN — BUDESONIDE 0.5 MG: 0.5 INHALANT ORAL at 19:11

## 2022-11-23 RX ADMIN — INSULIN LISPRO 10 UNITS: 100 INJECTION, SOLUTION INTRAVENOUS; SUBCUTANEOUS at 18:18

## 2022-11-23 RX ADMIN — TAMSULOSIN HYDROCHLORIDE 0.4 MG: 0.4 CAPSULE ORAL at 18:19

## 2022-11-23 RX ADMIN — INSULIN LISPRO 3 UNITS: 100 INJECTION, SOLUTION INTRAVENOUS; SUBCUTANEOUS at 23:47

## 2022-11-23 RX ADMIN — Medication 3 MG: at 22:20

## 2022-11-23 RX ADMIN — PANTOPRAZOLE SODIUM 40 MG: 40 TABLET, DELAYED RELEASE ORAL at 18:19

## 2022-11-23 RX ADMIN — FLUTICASONE PROPIONATE 1 PUFF: 110 AEROSOL, METERED RESPIRATORY (INHALATION) at 22:20

## 2022-11-23 RX ADMIN — FORMOTEROL FUMARATE DIHYDRATE 20 MCG: 20 SOLUTION RESPIRATORY (INHALATION) at 19:12

## 2022-11-23 RX ADMIN — PRAVASTATIN SODIUM 80 MG: 80 TABLET ORAL at 18:19

## 2022-11-23 RX ADMIN — GABAPENTIN 100 MG: 100 CAPSULE ORAL at 18:19

## 2022-11-23 RX ADMIN — INSULIN LISPRO 4 UNITS: 100 INJECTION, SOLUTION INTRAVENOUS; SUBCUTANEOUS at 12:44

## 2022-11-23 RX ADMIN — GABAPENTIN 100 MG: 100 CAPSULE ORAL at 08:29

## 2022-11-23 RX ADMIN — IPRATROPIUM BROMIDE 0.5 MG: 0.5 SOLUTION RESPIRATORY (INHALATION) at 14:15

## 2022-11-23 RX ADMIN — CEFEPIME 2000 MG: 2 INJECTION, POWDER, FOR SOLUTION INTRAVENOUS at 00:45

## 2022-11-23 NOTE — SEDATION DOCUMENTATION
Right Thoracentesis completed by Dr Johns Sea  900 dark yellow and pink tinged pleural fluid drained  Labs sent as ordered  Transported back to [address] [place] [address] [address] via bed  Denies pain or SOB   VSS

## 2022-11-23 NOTE — PROGRESS NOTES
[address] [facility]  Progress Note - Barb Costa Sr  1945, 78 y o  male MRN: [id number]  Unit/Bed#: [facility] 904-01 Encounter: [id number]  Primary Care Provider: Kay Elizabeth MD   Date and time admitted to hospital: 11/20/2022 10:40 AM       * Sepsis (evolving)  Assessment & Plan  • Likely secondary to UTI/bacteremia from Pseudomonas  • Evolving SIRS criteria including leukocytosis/tachycardia/tachypnea and intermittent fever at various points of hospitalization  • Await repeat blood cultures from 11/22  • Procalcitonin trend:  2 67 peak -> 1 37 today  • Monitor vitals and maintain hemodynamics - transferred out of the ICU on 11/22      Acute on chronic respiratory failure with hypoxia and hypercapnia   Assessment & Plan  • Multifactorial in the setting of underlying diastolic CHF, recurrent/persistent right pleural effusion, underlying COPD, and adenocarcinoma of lung, with suspected obstructive sleep apnea  • Baseline oxygen requirement of 2 L -> currently weaned down to 3 L from a peak of BIPAP/12 L  • Supportive care otherwise    Bacteremia secondary to Pseudomonas  Assessment & Plan  • Likely associated with UTI (see below)   • Blood cultures from 11/20 growing Pseudomonas -> follow-up repeat cultures from 11/22  • Continue IV Cefepime course    Urinary tract infection  Assessment & Plan  • Urine culture growing Pseudomonas  • Continue IV Cefepime - Infectious Disease following    Right pleural effusion  Assessment & Plan  • Patient with documented chronic recurrent right-sided pleural effusions  • S/p thoracentesis on 11/8/22 yielding approximately 770 mL of clear yellow fluid  • Follow-up CXRs reveal persistent moderate right-sided effusion -> in the setting of above baseline hypoxia, will appreciate repeat thoracocentesis by IR -> check fluid culture and cytology    Acute on chronic diastolic heart failure   Assessment & Plan  • Known diastolic CHF  ?  Last echocardiogram from 5/22: Left ventricular cavity size is normal  Wall thickness is increased  The left ventricular ejection fraction is 60%  Systolic function is normal  Wall motion is normal  Diastolic function is normal   ? Continue IV Bumex  (error )   ? Continue beta-blockade w/ Toprol-XL  ?  Monitor/replete serum potassium/magnesium deficiencies if present     Abnormal CT of chest  Assessment & Plan  • CT imaging initially showed a new near complete occlusion of middle lobe and right lower lobe bronchial branches with atelectasis   • Serial imaging (repeat CXR on 11/21) revealed persistent/moderate right pleural effusion  • Discussed w/ Infectious disease today -> less likely suspicion for pneumonia -> consider malignant effusion in setting of previously known right-sided adenocarcinoma (See plan for effusion)     Anemia of chronic disease  Assessment & Plan  • Monitor H/H  • Transfuse if hemoglobin < 7 0     Atrial fibrillation  Assessment & Plan  • Rate controlled on Coreg  • Continue Eliquis for anticoagulation     Acute kidney injury superimposed on chronic kidney disease stage 3   Assessment & Plan  • Baseline creatinine has fluctuated/increased over the last few years has currently progressed to around 2 3-2 7 (previously around 1 3-1 5) - currently elevated at 3 26  • Appreciate nephrology input -> await renal/bladder ultrasound  • Likely in the setting of sepsis/UTI  • Monitor renal function and urine output - limit/avoid nephrotoxins as possible - avoid hypotension as possible     Adenocarcinoma of lung  Assessment & Plan  • S/p resection and subsequent radiation  • Outpatient follow-up w/ oncology    Essential hypertension  Assessment & Plan  • Continue Coreg/Imdur     Insulin-dependent diabetes mellitus with neuropathy  Assessment & Plan        Lab Results   Component Value Date     HGBA1C 9 7 (H) 10/10/2022     • Continue basal/prandial insulin w/ additional SSI coverage per Accu-Cheks  • Carbohydrate restricted diet  • Hypoglycemia protocol  • Continue Gabapentin for neuropathy     Coronary artery disease  Assessment & Plan  • S/p prior coronary stenting  • Continue ASA/statin/Coreg/Imdur     COPD (chronic obstructive pulmonary disease)  Assessment & Plan  • Continue Pulmicort/Perforomist - continue nebulization treatments  • Maintain oxygenation as necessary    Thrombocytopenia  Assessment & Plan  • Likely reactive due to acute medical issue(s)  • Monitor platelet count - monitor for bleeding    GERD  Assessment & Plan  • Continue PPI regimen      DVT Prophylaxis:  Eliquis     Patient Centered Rounds:  I have performed bedside rounds and discussed plan of care with nursing today  Discussions with Specialists or Other Care Team Provider:  see above assessments if applicable    Education and Discussions with Family / Patient: At bedside    Time Spent for Care:  32 minutes  More than 50% of total time spent on counseling and coordination of care as described above  Current Length of Stay: 3 day(s)  Current Patient Status: Inpatient   Certification Statement:  Patient will continue to require additional hospital stay due to assessments as noted above  Code Status: Level 1 - Full Code        Subjective:     Seen examined earlier today  Remains weak/fatigued  States his breathing has somewhat improved  Sitting upright in a chair at the time of my encounter  Objective:     Vitals:   Temp (24hrs), Av °F (36 7 °C), Min:97 5 °F (36 4 °C), Max:98 4 °F (36 9 °C)    Temp:  [97 5 °F (36 4 °C)-98 4 °F (36 9 °C)] 97 5 °F (36 4 °C)  HR:  [] 78  Resp:  [18-22] 18  BP: (111-143)/(44-67) 112/51  SpO2:  [90 %-100 %] 96 %  Body mass index is 32 56 kg/m²  Input and Output Summary (last 24 hours):        Intake/Output Summary (Last 24 hours) at 2022 1519  Last data filed at 2022 1255  Gross per 24 hour   Intake 330 ml   Output 725 ml   Net -395 ml       Physical Exam:     GENERAL:  Weak/fatigued  HEAD: Normocephalic - atraumatic  EYES: PERRL - EOMI   MOUTH:  Mucosa moist  NECK:  Supple - full range of motion  CARDIAC:  Rate controlled - S1/S2 positive  PULMONARY:  Diminished bilaterally more prominent at the right base - nonlabored respirations on nasal cannula oxygenation  ABDOMEN:  Soft - nontender/nondistended - active bowel sounds  MUSCULOSKELETAL:  Motor strength/range of motion deconditioned - bilateral distal LE pitting edema w/ hyperpigmented stasis changes  NEUROLOGIC:  Alert/oriented at baseline  SKIN:  Chronic wrinkles/blemishes   PSYCHIATRIC:  Mood/affect stable      Additional Data:     Labs & Recent Cultures:    Results from last 7 days   Lab Units 11/23/22  0451 11/22/22  0521 11/21/22  0636   WBC Thousand/uL 10 58*   < > 17 90*   HEMOGLOBIN g/dL 7 8*   < > 7 5*   HEMATOCRIT % 26 5*   < > 26 0*   PLATELETS Thousands/uL 143*   < > 153   NEUTROS PCT %  --   --  89*   LYMPHS PCT %  --   --  2*   MONOS PCT %  --   --  7   EOS PCT %  --   --  1    < > = values in this interval not displayed  Results from last 7 days   Lab Units 11/23/22  0451 11/22/22  0521 11/21/22  0636   POTASSIUM mmol/L 4 5   < > 4 0   CHLORIDE mmol/L 101   < > 101   CO2 mmol/L 30   < > 33*   BUN mg/dL 60*   < > 38*   CREATININE mg/dL 3 26*   < > 2 32*   CALCIUM mg/dL 8 4   < > 8 3   ALK PHOS U/L  --   --  69   ALT U/L  --   --  10*   AST U/L  --   --  9    < > = values in this interval not displayed       Results from last 7 days   Lab Units 11/20/22  1235   INR  1 08     Results from last 7 days   Lab Units 11/23/22  1122 11/23/22  0743 11/22/22  2053 11/22/22  1615 11/22/22  1145 11/22/22  0527 11/21/22  2102 11/21/22  1546 11/21/22  1126 11/21/22  0622 11/20/22  2201 11/20/22  1812   POC GLUCOSE mg/dl 302* 241* 328* 356* 241* 274* 238* 215* 216* 219* 302* 466*         Results from last 7 days   Lab Units 11/23/22  0451 11/21/22  0636 11/20/22  2243 11/20/22  1235 11/20/22  1102   LACTIC ACID mmol/L  --   --  1 5 2 0  -- PROCALCITONIN ng/ml 1 37* 2 67*  --   --  0 26*         Results from last 7 days   Lab Units 11/22/22  1700 11/20/22  1322 11/20/22  1245 11/20/22  1220   BLOOD CULTURE  Received in Microbiology Lab  Culture in Progress  Received in Microbiology Lab  Culture in Progress  --  Pseudomonas aeruginosa* Pseudomonas aeruginosa*   GRAM STAIN RESULT   --   --  Gram negative rods* Gram negative rods*   URINE CULTURE   --  60,000-69,000 cfu/ml Pseudomonas aeruginosa*  --   --          Lines/Drains:  Invasive Devices     Peripheral Intravenous Line  Duration           Peripheral IV 11/20/22 Right Antecubital 3 days    Peripheral IV 11/21/22 Dorsal (posterior); Right Hand 2 days          Drain  Duration           Urethral Catheter Straight-tip 18 Fr  3 days                  Last 24 Hours Medication List:   Current Facility-Administered Medications   Medication Dose Route Frequency Provider Last Rate   • apixaban  5 mg Oral BID Terri Chaves MD     • aspirin  81 mg Oral Daily Terri Chaves MD     • budesonide  0 5 mg Nebulization Q12H Terri Chaves MD     • bumetanide  1 mg Intravenous BID Junior Leal MD     • carvedilol  6 25 mg Oral BID With Meals Terri Chaves MD     • [START ON 11/24/2022] cefepime  1,000 mg Intravenous Q12H Abida Wiggins MD     • fluticasone  1 puff Inhalation Q12H [facility] Terri Chaves MD     • formoterol  20 mcg Nebulization Q12H Terri Chaves MD     • gabapentin  100 mg Oral TID Terri Chaves MD     • insulin glargine  40 Units Subcutaneous HS Terri Chaves MD     • insulin lispro  1-6 Units Subcutaneous TID [facility] Terri Chaves MD     • insulin lispro  1-6 Units Subcutaneous  Terri Chaves MD     • insulin lispro  10 Units Subcutaneous Daily With Breakfast Terri Chaves MD     • insulin lispro  10 Units Subcutaneous Daily With Lunch Terri Chaves MD     • insulin lispro  10 Units Subcutaneous Daily With Elston Callas, MD     • ipratropium  0 5 mg Nebulization Gertrude Holter, MD     • isosorbide mononitrate  30 mg Oral Daily Dereje Singer MD     • levalbuterol  1 25 mg Nebulization Q6H Dereje Singer MD     • melatonin  3 mg Oral HS Sara Duque PA-C     • ondansetron  4 mg Intravenous Q6H PRN Dereje Singer MD     • pantoprazole  40 mg Oral BID AC Dereje Singer MD     • pravastatin  80 mg Oral Daily With Joby Hallman MD     • senna-docusate sodium  1 tablet Oral HS Dereje Singer MD     • sodium chloride  1 spray Each Nare Q1H PRN Sara Duque PA-C     • tamsulosin  0 4 mg Oral Daily With Joby Hallman MD                        ** Please Note: This note is constructed using a voice recognition dictation system  An occasional wrong word/phrase or “sound-a-like” substitution may have been picked up by dictation device due to the inherent limitations of voice recognition software  Read the chart carefully and recognize, using reasonable context, where substitutions may have occurred  **

## 2022-11-23 NOTE — PHYSICAL THERAPY NOTE
PHYSICAL THERAPY NOTE          Patient Name: Edel Zapata  Today's Date: 11/23/2022 11/23/22 6059   PT Last Visit   PT Visit Date 11/23/22   Note Type   Note Type Treatment   Pain Assessment   Pain Assessment Tool 0-10   Pain Score No Pain   Restrictions/Precautions   Weight Bearing Precautions Per Order No   Other Precautions Cognitive; Chair Alarm; Bed Alarm;Multiple lines; Fall Risk   General   Chart Reviewed Yes   Response to Previous Treatment Patient with no complaints from previous session  Family/Caregiver Present No   Cognition   Arousal/Participation Responsive; Cooperative   Attention Attends with cues to redirect   Orientation Level Oriented to person;Oriented to place;Oriented to time   Memory Decreased recall of recent events;Decreased recall of precautions   Following Commands Follows one step commands with increased time or repetition   Comments pt with flat affect, cooperative throughout session  Pt with increased fatigue noted, increased time required to complete all tasks   Bed Mobility   Supine to Sit Unable to assess   Sit to Supine Unable to assess   Additional Comments pt sitting OOB in recliner upon arrival  Pt returned to sitting OOB in recliner with all needs wihtin reach at the end of therapy session   Transfers   Sit to Stand 4  Minimal assistance   Additional items Assist x 1; Armrests; Increased time required;Verbal cues   Stand to Sit 4  Minimal assistance   Additional items Assist x 1; Increased time required;Verbal cues   Additional Comments transfers with RW, cues for hand placement and sequencing   Ambulation/Elevation   Gait pattern Excessively slow; Short stride; Foward flexed;Decreased foot clearance   Gait Assistance 4  Minimal assist  (progresses to mod A with fatigue)   Additional items Assist x 1;Verbal cues  (chair follow for increased safety)   Assistive Device Rolling walker Distance 25ft (seated break) 25ft (seated break)   Balance   Static Sitting Fair   Dynamic Sitting Fair -   Static Standing Poor +   Dynamic Standing Poor +   Ambulatory Poor   Endurance Deficit   Endurance Deficit Yes   Endurance Deficit Description generalized weakness, fatigue, deconditioning   Activity Tolerance   Activity Tolerance Patient limited by fatigue   Medical Staff Made Aware bettina Parsons   Nurse Made Aware RN cleared pt to participate in therapy session   Assessment   Prognosis Good   Problem List Decreased strength;Decreased range of motion;Decreased endurance; Impaired balance;Decreased mobility; Decreased coordination; Impaired judgement;Decreased safety awareness   Assessment Pt seen for PT treatment session this date  Therapy session focused on functional transfers, gait training and endurance training in order to improve overall mobility and independence  Pt requires assist of 1 for all mobility performed this date, chair follow utilized for increased safety  Pt making progress toward goals as demonstrated by ambulating increased distances with use of RW, pt does progress to requiring mod A for ambulation with fatigue  Pt required extended seated rest breaks after all mobility, increased time required throughout session  Attempted to perform LE therex however pt only able to complete b/l LAQs, deferred further exercise 2* fatigue  Pt was left sitting OOB in recliner at the end of PT session with all needs in reach  Pt would benefit from continued PT services while in hospital to address remaining limitations  PT to continue to follow pt and recommends rehab upon d c  The patient's AM-PAC Basic Mobility Inpatient Short Form Raw Score is 16  A Raw score of less than or equal to 16 suggests the patient may benefit from discharge to post-acute rehabilitation services  Please also refer to the recommendation of the Physical Therapist for safe discharge planning     Barriers to Discharge Decreased caregiver support; Inaccessible home environment   Goals   Patient Goals to get some rest   STG Expiration Date 12/02/22   PT Treatment Day 1   Plan   Treatment/Interventions ADL retraining;Functional transfer training;LE strengthening/ROM; Endurance training;Patient/family training;Equipment eval/education;Gait training;Spoke to nursing;Spoke to case management   Progress Progressing toward goals   PT Frequency 3-5x/wk   Recommendation   PT Discharge Recommendation Post acute rehabilitation services   Equipment Recommended [facility] Recommended Wheeled walker   AM-PAC Basic Mobility Inpatient   Turning in Bed Without Bedrails 3   Lying on Back to Sitting on Edge of Flat Bed 3   Moving Bed to Chair 3   Standing Up From Chair 3   Walk in Room 2   Climb 3-5 Stairs 2   Basic Mobility Inpatient Raw Score 16   Basic Mobility Standardized Score 38 32   Highest Level Of Mobility   JH-HLM Goal 5: Stand one or more mins   JH-HLM Achieved 7: Walk 25 feet or more   Education   Education Provided Mobility training;Assistive device   Patient Demonstrates acceptance/verbal understanding   End of Consult   Patient Position at End of Consult Bedside chair; All needs within reach;Bed/Chair alarm activated     Jero Ku, PT, DPT

## 2022-11-23 NOTE — DISCHARGE INSTRUCTIONS
If you feels short of breat, you can call in tervention radiology to set up outpatient thoracentesis    Thoracentesis   WHAT YOU NEED TO KNOW:   A thoracentesis is a procedure to remove extra fluid or air from between your lungs and your inner chest wall  Air or fluid buildup may make it hard for you to breathe  A thoracentesis allows your lungs to expand fully so you can breathe more easily  DISCHARGE INSTRUCTIONS:     Small amount of shoulder pain and bloody sputum is normal after a Thoracentesis  Rest:  Rest when you feel it is needed  Slowly start to do more each day  Return to your daily activities as directed  Resume your normal diet  Small sips of flat soda will help mild nausea  Do not smoke: If you smoke, it is never too late to quit  Ask for information about how to stop smoking if you need help  Contact Interventional Radiology at [phone] [place] PATIENTS: Contact Interventional Radiology at [phone]) Holley Escoto PATIENTS: Contact Interventional Radiology at [phone]) if:   You have a fever  Your puncture site is red, warm, swollen, or draining pus  You have questions or concerns about your procedure, medicine, or care  Seek care immediately or call 911 if:   Severe chest pain with inspiration and shortness of breath    Large amounts of blood in your sputum    Follow up with your healthcare provider as directed

## 2022-11-23 NOTE — PLAN OF CARE
Problem: PHYSICAL THERAPY ADULT  Goal: Performs mobility at highest level of function for planned discharge setting  See evaluation for individualized goals  Description: Treatment/Interventions: ADL retraining, Functional transfer training, LE strengthening/ROM, Elevations, Therapeutic exercise, Endurance training, Cognitive reorientation, Patient/family training, Equipment eval/education, Bed mobility, Gait training, Continued evaluation, Compensatory technique education, Spoke to nursing, Spoke to case management, Spoke to advanced practitioner, OT  Equipment Recommended: Pablito Arredondo       See flowsheet documentation for full assessment, interventions and recommendations  Outcome: Progressing  Note: Prognosis: Good  Problem List: Decreased strength, Decreased range of motion, Decreased endurance, Impaired balance, Decreased mobility, Decreased coordination, Impaired judgement, Decreased safety awareness  Assessment: Pt seen for PT treatment session this date  Therapy session focused on functional transfers, gait training and endurance training in order to improve overall mobility and independence  Pt requires assist of 1 for all mobility performed this date, chair follow utilized for increased safety  Pt making progress toward goals as demonstrated by ambulating increased distances with use of RW, pt does progress to requiring mod A for ambulation with fatigue  Pt required extended seated rest breaks after all mobility, increased time required throughout session  Attempted to perform LE therex however pt only able to complete b/l LAQs, deferred further exercise 2* fatigue  Pt was left sitting OOB in recliner at the end of PT session with all needs in reach  Pt would benefit from continued PT services while in hospital to address remaining limitations  PT to continue to follow pt and recommends rehab upon d c  The patient's AM-PAC Basic Mobility Inpatient Short Form Raw Score is 16   A Raw score of less than or equal to 16 suggests the patient may benefit from discharge to post-acute rehabilitation services  Please also refer to the recommendation of the Physical Therapist for safe discharge planning  Barriers to Discharge: Decreased caregiver support, Inaccessible home environment     PT Discharge Recommendation: Post acute rehabilitation services    See flowsheet documentation for full assessment

## 2022-11-23 NOTE — PLAN OF CARE
Problem: OCCUPATIONAL THERAPY ADULT  Goal: Performs self-care activities at highest level of function for planned discharge setting  See evaluation for individualized goals  Description: Treatment Interventions: ADL retraining, Functional transfer training, Endurance training, Cognitive reorientation, Patient/family training, Equipment evaluation/education, Compensatory technique education, Energy conservation, Activityengagement  Equipment Recommended: Bedside commode, Shower/Tub chair with back ($)       See flowsheet documentation for full assessment, interventions and recommendations  Outcome: Progressing  Note: Limitation: Decreased ADL status, Decreased endurance, Decreased self-care trans, Decreased high-level ADLs, Decreased Safe judgement during ADL  Prognosis: Fair  Assessment: Patient participated in Skilled OT session this date with interventions consisting of ADL re-training, energy conservation, bed mobility, functional mobility  Patient agreeable to OT treatment session, upon arrival patient was found supine in bed  Pt very fatigued today and requiring frequent rest breaks during functional tasks  Pt educated on energy conservation including performing ADL tasks seated, rest breaks  Pt agreeable to participation with some encouragement  Patient continues to be functioning below baseline level, occupational performance remains limited secondary to factors listed above and increased risk for falls and injury  From OT standpoint, recommendation at time of d/c would be POST-ACUTE [facility]  Patient to benefit from continued Occupational Therapy treatment while in the hospital to address deficits as defined above and maximize level of functional independence with ADLs and functional mobility       OT Discharge Recommendation: Post acute rehabilitation services

## 2022-11-23 NOTE — OCCUPATIONAL THERAPY NOTE
Occupational Therapy Progress Note     Patient Name: Shira Manuel  Today's Date: 11/23/2022  Problem List  Principal Problem:    Sepsis ([place] [address] )  Active Problems:    COPD (chronic obstructive pulmonary disease) ([place] [address] )    HLD (hyperlipidemia)    Type 2 diabetes mellitus with hyperglycemia, with long-term current use of insulin (HCC)    Pleural effusion on right    Essential hypertension    CAD (coronary artery disease)    Acute on chronic respiratory failure with hypoxia and hypercapnia ([facility])    Adenocarcinoma of lung (HCC)    Stage 3b chronic kidney disease (CKD) ([place] [address] )    A-fib (HCC)    Anemia    Pneumonia    Acute on chronic diastolic heart failure ([place] [address] )            11/23/22 0850   OT Last Visit   OT Visit Date 11/23/22   Note Type   Note Type Treatment   Pain Assessment   Pain Assessment Tool 0-10   Pain Score No Pain   Restrictions/Precautions   Weight Bearing Precautions Per Order No   Other Precautions Chair Alarm; Bed Alarm; Fall Risk;O2;Multiple lines   ADL   Where Assessed Chair   Grooming Assistance 5  Supervision/Setup   Grooming Comments washed face   UB Bathing Assistance 4  Minimal Assistance   UB Bathing Deficit Setup;Supervision/safety; Increased time to complete  (assist for thoroughness 2' fatigue)   LB Bathing Assistance 3  Moderate Assistance   LB Bathing Deficit Setup;Supervision/safety; Increased time to complete;Right lower leg including foot; Left lower leg including foot; Buttocks   UB Dressing Assistance 4  Minimal Assistance   UB Dressing Deficit Setup; Increased time to complete;Pull around back   LB Dressing Assistance 2  Maximal Assistance   LB Dressing Deficit Don/doff R sock; Don/doff L sock   Bed Mobility   Supine to Sit 4  Minimal assistance   Additional items Assist x 1; Increased time required;Verbal cues;LE management   Transfers   Sit to Stand 4  Minimal assistance   Additional items Assist x 1; Increased time required   Stand to Sit 4  Minimal assistance   Additional items Assist x 1;Increased time required   Additional Comments RW   Functional Mobility   Functional Mobility 4  Minimal assistance   Additional Comments EOB to scale, scale to bedside recliner   Additional items Hand hold assistance   Cognition   Arousal/Participation Responsive; Cooperative   Attention Attends with cues to redirect   Orientation Level Oriented to person;Oriented to place;Oriented to time;Disoriented to situation   Memory Decreased recall of recent events   Following Commands Follows one step commands without difficulty   Comments Pt agreeable to session  Pt very fatigued, occasionally falling asleep/requiring prompting to maintain alertness  Activity Tolerance   Activity Tolerance Patient limited by fatigue   Medical Staff Made Aware Per RN pt appropriate to be seen   Assessment   Assessment Patient participated in Skilled OT session this date with interventions consisting of ADL re-training, energy conservation, bed mobility, functional mobility  Patient agreeable to OT treatment session, upon arrival patient was found supine in bed  Pt very fatigued today and requiring frequent rest breaks during functional tasks  Pt educated on energy conservation including performing ADL tasks seated, rest breaks  Pt agreeable to participation with some encouragement  Patient continues to be functioning below baseline level, occupational performance remains limited secondary to factors listed above and increased risk for falls and injury  From OT standpoint, recommendation at time of d/c would be POST-ACUTE [facility] SERVICES  Patient to benefit from continued Occupational Therapy treatment while in the hospital to address deficits as defined above and maximize level of functional independence with ADLs and functional mobility     Plan   Treatment Interventions ADL retraining;Functional transfer training;Patient/family training;Energy conservation   Goal Expiration Date 12/06/22   OT Treatment Day 1   OT Frequency Other (comment)  (2-4x/wk)   Recommendation   OT Discharge Recommendation Post acute rehabilitation services   Equipment Recommended Bedside commode; Shower/Tub chair with back ($)   Commode Type Standard   AM-PAC Daily Activity Inpatient   Lower Body Dressing 2   Bathing 2   Toileting 2   Upper Body Dressing 3   Grooming 3   Eating 4   Daily Activity Raw Score 16   Daily Activity Standardized Score (Calc for Raw Score >=11) 35 96   AM-PAC Applied Cognition Inpatient   Following a Speech/Presentation 2   Understanding Ordinary Conversation 4   Taking Medications 3   Remembering Where Things Are Placed or Put Away 3   Remembering List of 4-5 Errands 3   Taking Care of Complicated Tasks 2   Applied Cognition Raw Score 17   Applied Cognition Standardized Score 36 52   End of Consult   Patient Position at End of Consult Bedside chair;Bed/Chair alarm activated; All needs within reach   Nurse Communication Nurse aware of consult           The patient's raw score on the AM-PAC Daily Activity inpatient short form is 16, standardized score is 35 96, less than 39 4  Patients at this level are likely to benefit from discharge to post-acute rehabilitation services  Please refer to the recommendation of the Occupational Therapist for safe discharge planning            Cathie Oliveira

## 2022-11-23 NOTE — CONSULTS
Consultation - Nephrology   Adelene Burkitt Sr  78 y o  male MRN: [id number]  Unit/Bed#: [facility] 904-01 Encounter: [id number]    ASSESSMENT AND PLAN:  78 y o man with PMH of CHF, COPD, hyperlipidemia, DM, HTN, CAD, chucky carcinoma, CKD G4 (bl creatinine 1 9-2 5 mg/dL) with previous episodes of MARANDA, AFib on Eliquis p/w SOB with hypoxic respiratory failure   Nephrology is consulted for management  Of MARANDA    PLAN  #Recurrent Non-Oliguric KDIGO MARANDA stage 1 on CKD G4  • Etiology:  Multifactorial likely secondary to CRS and sepsis and previous episodes of ATN  • Baseline creatinine:  2 5-2 7 mg/dL, likely new baseline after recent episodes of MARANDA  • Current creatinine:  3 26 mg/dL, trending up  • Peak creatinine:  Trending  • UA:  Hematuria, leukocyturia  • Renal imaging :  Ordered  • Treatment:  • No indication of urgent dialysis at this time  • Maintain MAP:  Over 65 mmHg if possible/avoid hypoperfusion:  Hold parameters on blood pressure medications  • Avoid nephrotoxic agents such as NSAIDs, and IV contrast if possible   Avoid opioids   • Adjust medications to GFR    #CKD G4 with progression of kidney disease  · Baseline creatinine:  New baseline 2 5-2 7 mg/dL after recent episodes of MARANDA  · Etiology:  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      #Acid-base Disorder  • serum HCO3 30 millimole per L  • Metabolic alkalosis likely secondary to vascular contraction in the settings of diuretics    #Volume status/hypertension:  • Volume:  Fluid overload  • Blood pressure:  Tendency to hypotension /51mmhg, goal < 140/90  • Recommend:  • Low-sodium diet  • Coreg 6 25 b i d   • S/p Lasix  • Start bumex mg bid iv     #Anemia:  • urrent hemoglobin: 7 8mg/dL  • Multifactorial secondary to CKD and chronic inflammation  • Treatment:  • Transfuse for hemoglobin less than 7 0 per primary service      # hyperglycemia  · Uncontrolled diabetes  ·  HbA1c9  · Management as per primary team  · Advised to maintain a good DM control to prevent progression of CKD     # pneumonia/sepsis  · On cefepime   · Blood culture positive for Pseudomona    HISTORY OF PRESENT ILLNESS:  Requesting Physician: Joo Jeffrey MD  Reason for Consult: MARANDA    Emily Barnes Sr  is a 78 y o  male with PMH of CHF, COPD, hyperlipidemia, diabetes, hypertension, CAD, attend a carcinoma of the lung, CKD G4 (bl creatinine 1 9-2 5 mg/dL) with previous episodes of MARANDA, AFib on Eliquis, who was admitted to [facility] after presenting with shortness of breath with hypoxic respiratory failure   A renal consultation is requested today for assistance in the management of MARANDA    PAST MEDICAL HISTORY:  Past Medical History:   Diagnosis Date   • Abdominal pain    • MARANDA (acute kidney injury) ([facility] [address] ) 6/19/2018   • Cardiac disease    • CHF (congestive heart failure) (HCC)    • COPD, severe (HCC)    • Coronary artery disease    • DDD (degenerative disc disease), lumbar 9/1/2020   • Diabetes mellitus (HCC)    • Dyspnea    • GERD (gastroesophageal reflux disease)    • Hyperlipidemia    • Hypertension    • MI (myocardial infarction) ([facility] [address] )     with 3 stents   • Nodule of apex of right lung    • JACQUELINE (obstructive sleep apnea)    • Prostate cancer ([facility] [address] )        PAST SURGICAL HISTORY:  Past Surgical History:   Procedure Laterality Date   • ABDOMINAL SURGERY      exploratory   • ANGIOPLASTY      3 stents   • APPENDECTOMY     • COLONOSCOPY  2015   • CT NEEDLE BIOPSY LUNG  11/3/2020   • ESOPHAGOGASTRODUODENOSCOPY N/A 10/2/2017    Procedure: ESOPHAGOGASTRODUODENOSCOPY (EGD); Surgeon: Kenzie Henao MD;  Location: [facility];   Service: Gastroenterology   • IR THORACENTESIS  11/3/2020   • IR THORACENTESIS  11/8/2022   • KNEE CARTILAGE SURGERY     • OTHER SURGICAL HISTORY      stent placement   • PROSTATE SURGERY     • SKIN GRAFT      Basal cell CA back       ALLERGIES:  Allergies   Allergen Reactions   • Crestor [Rosuvastatin] Other (See Comments)     Unknown     • Lisinopril GI Intolerance, Dizziness and Abdominal Pain   • Metformin GI Intolerance       SOCIAL HISTORY:  Social History     Substance and Sexual Activity   Alcohol Use Never     Social History     Substance and Sexual Activity   Drug Use No     Social History     Tobacco Use   Smoking Status Former   • Packs/day: 1 50   • Years: 50 00   • Pack years: 75 00   • Types: Cigarettes   • Start date: 38   • Quit date: 2020   • Years since quittin 3   Smokeless Tobacco Never       FAMILY HISTORY:  Family History   Problem Relation Age of Onset   • Heart disease Father    • Other Father         Mesothelioma        MEDICATIONS:    Current Facility-Administered Medications:   •  apixaban (ELIQUIS) tablet 5 mg, 5 mg, Oral, BID, Terri Chaves MD, 5 mg at 22 6468  •  aspirin (ECOTRIN LOW STRENGTH) EC tablet 81 mg, 81 mg, Oral, Daily, Terri Chaves MD, 81 mg at 22 1325  •  budesonide (PULMICORT) inhalation solution 0 5 mg, 0 5 mg, Nebulization, Q12H, Terri Chaves MD, 0 5 mg at 22 0735  •  bumetanide (BUMEX) injection 1 mg, 1 mg, Intravenous, BID, Junior Leal MD  •  carvedilol (COREG) tablet 6 25 mg, 6 25 mg, Oral, BID With Meals, Terri Chaves MD, 6 25 mg at 22 8616  •  cefepime (MAXIPIME) 2 g/50 mL dextrose IVPB, 2,000 mg, Intravenous, Q12H, Terri Chaves MD, Stopped at 22 0115  •  fluticasone (FLOVENT HFA) 110 MCG/ACT inhaler 1 puff, 1 puff, Inhalation, Q12H [address], Terri Chaves MD, 1 puff at 22 7981  •  formoterol (PERFOROMIST) nebulizer solution 20 mcg, 20 mcg, Nebulization, Q12H, Terri Chaves MD, 20 mcg at 22 2712  •  gabapentin (NEURONTIN) capsule 100 mg, 100 mg, Oral, TID, Terri Chaves MD, 100 mg at 22 4753  •  insulin glargine (LANTUS) subcutaneous injection 40 Units 0 4 mL, 40 Units, Subcutaneous, HS, Terri Chaves MD, 40 Units at 22  •  insulin lispro (HumaLOG) 100 units/mL subcutaneous injection 1-6 Units, 1-6 Units, Subcutaneous, TID AC, 3 Units at 11/23/22 0830 **AND** Fingerstick Glucose (POCT), , , 4x Daily AC and at bedtime, Sam Wiggins MD  •  insulin lispro (HumaLOG) 100 units/mL subcutaneous injection 1-6 Units, 1-6 Units, Subcutaneous, HS, Sam Wiggins MD, 5 Units at 11/22/22 2123  •  insulin lispro (HumaLOG) 100 units/mL subcutaneous injection 10 Units, 10 Units, Subcutaneous, Daily With Breakfast, Sam Wiggins MD, 10 Units at 11/23/22 0830  •  insulin lispro (HumaLOG) 100 units/mL subcutaneous injection 10 Units, 10 Units, Subcutaneous, Daily With Lunch, Sam Wiggins MD  •  insulin lispro (HumaLOG) 100 units/mL subcutaneous injection 10 Units, 10 Units, Subcutaneous, Daily With Juwan Foster MD, 10 Units at 11/22/22 1808  •  ipratropium (ATROVENT) 0 02 % inhalation solution 0 5 mg, 0 5 mg, Nebulization, Q6H, Sam Wiggins MD, 0 5 mg at 11/23/22 4654  •  isosorbide mononitrate (IMDUR) 24 hr tablet 30 mg, 30 mg, Oral, Daily, Sam Wiggins MD, 30 mg at 11/23/22 4854  •  levalbuterol ([place]) inhalation solution 1 25 mg, 1 25 mg, Nebulization, Q6H, Sam Wiggins MD, 1 25 mg at 11/23/22 8611  •  melatonin tablet 3 mg, 3 mg, Oral, HS, Orval Or, PA-C, 3 mg at 11/22/22 2132  •  ondansetron (ZOFRAN) injection 4 mg, 4 mg, Intravenous, Q6H PRN, Sam Wiggins MD  •  pantoprazole (PROTONIX) EC tablet 40 mg, 40 mg, Oral, BID AC, Sam Wiggins MD, 40 mg at 11/23/22 3989  •  pravastatin (PRAVACHOL) tablet 80 mg, 80 mg, Oral, Daily With Juwan Foster MD, 80 mg at 11/22/22 1715  •  senna-docusate sodium (SENOKOT S) 8 6-50 mg per tablet 1 tablet, 1 tablet, Oral, HS, Sam Wiggins MD, 1 tablet at 11/22/22 2122  •  sodium chloride (OCEAN) 0 65 % nasal spray 1 spray, 1 spray, Each Nare, Q1H PRN, Ornila OrRICK  •  tamsulosin (FLOMAX) capsule 0 4 mg, 0 4 mg, Oral, Daily With Dinner, Sam Wiggins MD, 0 4 mg at 11/22/22 1715    REVIEW OF SYSTEMS:  Complete 10 point review of systems were obtained and discussed in length with the patient  Complete review of systems were negative / unremarkable except mentioned in the HPI section       Review of Systems - Psychological ROS: negative  Ophthalmic ROS: negative  ENT ROS: negative  Hematological and Lymphatic ROS: negative  Endocrine ROS: negative  Respiratory ROS: positive for - shortness of breath  Cardiovascular ROS: no chest pain or dyspnea on exertion  Gastrointestinal ROS: no abdominal pain, change in bowel habits, or black or bloody stools  Genito-Urinary ROS: no dysuria, trouble voiding, or hematuria  Musculoskeletal ROS: negative  Neurological ROS: no TIA or stroke symptoms  Dermatological ROS: negative     PHYSICAL EXAM:  Current Weight: Weight - Scale: 109 kg (240 lb 1 3 oz)  First Weight: Weight - Scale: 109 kg (240 lb)  Vitals:    11/23/22 0744   BP: 112/51   Pulse: 78   Resp: 18   Temp: 97 5 °F (36 4 °C)   SpO2: 98%       Intake/Output Summary (Last 24 hours) at 11/23/2022 1240  Last data filed at 11/23/2022 0948  Gross per 24 hour   Intake 150 ml   Output 725 ml   Net -575 ml     Wt Readings from Last 3 Encounters:   11/23/22 109 kg (240 lb 1 3 oz)   11/09/22 103 kg (227 lb 3 2 oz)   11/01/22 97 4 kg (214 lb 11 7 oz)     Temp Readings from Last 3 Encounters:   11/23/22 97 5 °F (36 4 °C) (Temporal)   11/09/22 98 2 °F (36 8 °C) (Oral)   11/02/22 98 5 °F (36 9 °C)     BP Readings from Last 3 Encounters:   11/23/22 112/51   11/09/22 (!) 118/46   11/02/22 140/58     Pulse Readings from Last 3 Encounters:   11/23/22 78   11/09/22 65   11/02/22 94        General:  Obese, no acute distress at this time  Skin:  No acute rash  Eyes:  No scleral icterus and noninjected  ENT:  mucous membranes moist  Neck:  no carotid bruits  Chest:  Breath sounds decreased in both lungs specially right base, crackles  CVS:  Regular rate and rhythm without rub   Abdomen:  soft and nontender   Extremities:  Lower extremity edema 3+  Neuro:  No gross focality  Psych: Alert , cooperative       Invasive Devices:   Urethral Catheter Straight-tip 18 Fr  (Active)   Amt returned on insertion(mL) 300 mL 11/20/22 1309   Reasons to continue Urinary Catheter  Acute urinary retention/obstruction failing urinary retention protocol 11/23/22 0836   Goal for Removal Voiding trial when ambulation improves 11/23/22 0836   Site Assessment Clean;Skin intact 11/23/22 0836   Cook Care Done 11/23/22 0836   Collection Container Standard drainage bag 11/23/22 0836   Output (mL) 200 mL 11/23/22 0244     Lab Results:   Results from last 7 days   Lab Units 11/23/22  0451 11/22/22  0521 11/21/22  0636 11/20/22  2241 11/20/22  1102   WBC Thousand/uL 10 58* 12 99* 17 90*  --  17 88*   HEMOGLOBIN g/dL 7 8* 7 1* 7 5*  --  8 7*   HEMATOCRIT % 26 5* 24 8* 26 0*  --  29 6*   PLATELETS Thousands/uL 143* 132* 153  --  183   POTASSIUM mmol/L 4 5 4 2 4 0  --  4 1   CHLORIDE mmol/L 101 102 101  --  102   CO2 mmol/L 30 31 33*  --  29   BUN mg/dL 60* 46* 38*  --  31*   CREATININE mg/dL 3 26* 2 72* 2 32*  --  2 01*   CALCIUM mg/dL 8 4 8 3 8 3  --  8 4   MAGNESIUM mg/dL  --  2 5 2 3  --   --    PHOSPHORUS mg/dL  --  3 8 4 3* 4 4*  --        Other Studies:   XR chest portable ICU   Final Result by Mayank Nunez MD (11/21 1014)      Moderate right pleural effusion with associated right lung base opacity  Workstation performed: [id number]         CT chest wo contrast   Final Result by Bessy Chandra MD (11/20 1758)      1  Increase in moderate right pleural effusion with new small left effusion  2   New near complete occlusion of middle lobe and right lower lobe bronchial branches with atelectasis  3  New small perihepatic ascites  The study was marked in [facility] for immediate notification  Workstation performed: [id number]         XR chest 1 view portable   Final Result by Sam Archer MD (11/20 2056)      Increased size right pleural effusion    Workstation performed: [id number]         US kidney and bladder    (Results Pending)       Portions of the record may have been created with voice recognition software  Occasional wrong word or "sound a like" substitutions may have occurred due to the inherent limitations of voice recognition software  Read the chart carefully and recognize, using context, where substitutions have occurred

## 2022-11-23 NOTE — CONSULTS
Consultation - Infectious Disease   Barb Costa Sr  78 y o  male MRN: [id number]  Unit/Bed#: [facility] 904-01 Encounter: [id number]      IMPRESSION & RECOMMENDATIONS:   1  Sepsis-present on admission  Leukocytosis and tachypnea  Appears to be secondary to Pseudomonas bacteremia from urinary tract infection  No other definitive source appreciated  The patient has remained hemodynamically stable and seems to be tolerating the antibiotics without difficulty  -continue antibiotics as below  -recheck CBC with diff and BMP  -supportive care    2  Pseudomonas bacteremia-appears to be secondary to urinary tract infection in the setting of urinary retention  No other clear source appreciated  Could represent bacteremia from a pneumonia but the patient's pulmonary status seems to be around baseline and the CT scan has some chronic findings of the right base   -continue cefepime but decrease the dose to 1 g IV q 12 hours with worsening renal function  -follow up repeat blood cultures to confirm clearance    3  Pseudomonas UTI-in the setting of urinary retention  The patient now has a Cook catheter in place  Consideration for the possibility of ongoing obstruction/hydronephrosis  Consideration for the possibility of abscess  -antibiotics as above  -check renal ultrasound  -Cook drainage  -monitor symptomatology      4  Acute kidney injury-complicating chronic kidney disease  Renal function worsening  Suspected multifactorial including cardiorenal syndrome, sepsis, and possible ATN  Pre renal issues may be playing a role   -dose adjust antibiotics  -recheck BMP  -volume management  -check renal ultrasound  -close nephrology follow-up    5  Abnormal CT of the chest-with increased pleural effusion on the setting of known adenocarcinoma of the lung    Volume loss the right base seems somewhat chronic   -low threshold to do diagnostic thoracentesis again to look for malignancy and infection  -monitor respiratory status    6  Acute encephalopathy-suspect multifactorial including metabolic issues, sepsis  Improved  Monitor cognition for now and treat metabolic issues and sepsis    7  Diabetes mellitus-type 2 with hyperglycemia with long-term insulin use     Have discussed the above management plan in detail with the primary service    Extensive review of the medical records in epic including review of the notes, radiographs, and laboratory results     HISTORY OF PRESENT ILLNESS:  Reason for Consult:  Pseudomonas bacteremia  HPI: Riki Parmar  is a 78y o  year old male with COPD, diabetes mellitus, coronary artery disease, congestive heart failure, adenocarcinoma of the lung, chronic kidney disease, atrial fibrillation, admitted [facility] in [facility] with worsening shortness of breath and urinary urgency and frequency we are asked to assist with management of Pseudomonas bacteremia  The patient has a history lung adenocarcinoma status post SBRT  The patient has had multiple hospitalizations within the past year  He has severe COPD at baseline and requires oxygen but is not adherent with treatment  In the past few days prior to this admission he developed worsening shortness of breath as well as urinary urgency and frequency  He therefore was brought to the emergency department for further evaluation  He was found to have an elevated WBC count, hypoxemia in the setting of shortness of breath, and on imaging had near complete occlusion of the middle lobe and right lower lobe with resultant atelectasis  He was diagnosed with possible pneumonia and was started on vancomycin and cefepime after blood cultures and urine cultures were obtained  His blood cultures and urine cultures have now grown Pseudomonas aeruginosa  He has had a Cook catheter placed in the face of urinary retention  His respiratory status has improved and he is now requiring decreased oxygen support    He denies any nausea vomiting or diarrhea, denies any sputum production, denies any new rash or skin lesions, denies any new joint or muscle pains  He is not having any abdominal or chest pain    REVIEW OF SYSTEMS:  A complete review of systems is negative other than that noted in the HPI  PAST MEDICAL HISTORY:  Past Medical History:   Diagnosis Date   • Abdominal pain    • [facility] 2018   • Cardiac disease    • CHF (congestive heart failure) ([facility])    • COPD, severe (HCC)    • Coronary artery disease    • DDD (degenerative disc disease), lumbar 2020   • Diabetes mellitus (HCC)    • Dyspnea    • GERD (gastroesophageal reflux disease)    • Hyperlipidemia    • Hypertension    • MI (myocardial infarction) ([facility] )     with 3 stents   • Nodule of apex of right lung    • JACQUELINE (obstructive sleep apnea)    • Prostate cancer [facility])      Past Surgical History:   Procedure Laterality Date   • ABDOMINAL SURGERY      exploratory   • ANGIOPLASTY      3 stents   • APPENDECTOMY     • COLONOSCOPY     • CT NEEDLE BIOPSY LUNG  11/3/2020   • ESOPHAGOGASTRODUODENOSCOPY N/A 10/2/2017    Procedure: ESOPHAGOGASTRODUODENOSCOPY (EGD); Surgeon: Kevin Brumfield MD;  Location: [facility];   Service: Gastroenterology   • IR THORACENTESIS  11/3/2020   • IR THORACENTESIS  2022   • KNEE CARTILAGE SURGERY     • OTHER SURGICAL HISTORY      stent placement   • PROSTATE SURGERY     • SKIN GRAFT      Basal cell CA back       FAMILY HISTORY:  Non-contributory    SOCIAL HISTORY:  Social History   Social History     Substance and Sexual Activity   Alcohol Use Never     Social History     Substance and Sexual Activity   Drug Use No     Social History     Tobacco Use   Smoking Status Former   • Packs/day: 1 50   • Years: 50 00   • Pack years: 75 00   • Types: Cigarettes   • Start date: 38   • Quit date: 2020   • Years since quittin 3   Smokeless Tobacco Never       ALLERGIES:  Allergies   Allergen Reactions   • Crestor [Rosuvastatin] Other (See Comments)     Unknown     • Lisinopril GI Intolerance, Dizziness and Abdominal Pain   • Metformin GI Intolerance       MEDICATIONS:  All current active medications have been reviewed  Antibiotics:  Cefepime 4  PHYSICAL EXAM:  Temp:  [97 5 °F (36 4 °C)-98 7 °F (37 1 °C)] 97 5 °F (36 4 °C)  HR:  [] 78  Resp:  [18-22] 18  BP: (111-143)/(44-67) 112/51  SpO2:  [90 %-100 %] 98 %  Temp (24hrs), Av 2 °F (36 8 °C), Min:97 5 °F (36 4 °C), Max:98 7 °F (37 1 °C)  Current: Temperature: 97 5 °F (36 4 °C)    Intake/Output Summary (Last 24 hours) at 2022 1409  Last data filed at 2022 1255  Gross per 24 hour   Intake 330 ml   Output 725 ml   Net -395 ml       General Appearance:  Appearing chronically ill, nontoxic, and in no distress   Head:  Normocephalic, without obvious abnormality, atraumatic   Eyes:  Conjunctiva pink and sclera anicteric, both eyes   Nose: Nares normal, mucosa normal, no drainage   Throat: Oropharynx moist without lesions   Neck: Supple, symmetrical, no adenopathy, no tenderness/mass/nodules   Back:   Symmetric, no curvature, ROM normal, no CVA tenderness   Lungs:   Decreased breath sounds bilaterally, respirations unlabored   Chest Wall:  No tenderness or deformity   Heart:  RRR; no murmur, rub or gallop   Abdomen:   Soft, non-tender, non-distended, positive bowel sounds    Extremities: No cyanosis, clubbing  1+ lower extremity edema  Skin: No rashes or lesions  No draining wounds noted  Lymph nodes: Cervical, supraclavicular nodes normal   Neurologic: Alert and oriented times 3, extremity strength 5/5 and symmetric       LABS, IMAGING, & OTHER STUDIES:  Lab Results:  I have personally reviewed pertinent labs    Results from last 7 days   Lab Units 22  0451 22  0521 22  0636   WBC Thousand/uL 10 58* 12 99* 17 90*   HEMOGLOBIN g/dL 7 8* 7 1* 7 5*   PLATELETS Thousands/uL 143* 132* 153     Results from last 7 days   Lab Units 22  0451 22  7465 11/21/22  0636 11/20/22  1102   SODIUM mmol/L 137 137 138 137   POTASSIUM mmol/L 4 5 4 2 4 0 4 1   CHLORIDE mmol/L 101 102 101 102   CO2 mmol/L 30 31 33* 29   BUN mg/dL 60* 46* 38* 31*   CREATININE mg/dL 3 26* 2 72* 2 32* 2 01*   EGFR ml/min/1 73sq m 17 21 26 31   CALCIUM mg/dL 8 4 8 3 8 3 8 4   AST U/L  --   --  9 7   ALT U/L  --   --  10* 12   ALK PHOS U/L  --   --  69 84     Results from last 7 days   Lab Units 11/22/22  1700 11/20/22  1322 11/20/22  1256 11/20/22  1245 11/20/22  1220   BLOOD CULTURE  Received in Microbiology Lab  Culture in Progress  Received in Microbiology Lab  Culture in Progress  --   --  Pseudomonas aeruginosa* Pseudomonas aeruginosa*   GRAM STAIN RESULT   --   --   --  Gram negative rods* Gram negative rods*   URINE CULTURE   --  60,000-69,000 cfu/ml Pseudomonas aeruginosa*  --   --   --    MRSA CULTURE ONLY   --   --  Culture results to follow  --   --      Results from last 7 days   Lab Units 11/23/22  0451 11/21/22  0636 11/20/22  1102   PROCALCITONIN ng/ml 1 37* 2 67* 0 26*         Results from last 7 days   Lab Units 11/22/22  1703   FERRITIN ng/mL 63           Imaging Studies:     CT chest-chronic volume loss at the right base  Right pleural effusion      Images personally reviewed by me in PACS

## 2022-11-24 ENCOUNTER — APPOINTMENT (INPATIENT)
Dept: RADIOLOGY | Facility: HOSPITAL | Age: 77
End: 2022-11-24

## 2022-11-24 LAB
ANION GAP SERPL CALCULATED.3IONS-SCNC: 3 MMOL/L (ref 4–13)
ANION GAP SERPL CALCULATED.3IONS-SCNC: 8 MMOL/L (ref 4–13)
ARTERIAL PATENCY WRIST A: YES
BASE EXCESS BLDA CALC-SCNC: 4.6 MMOL/L
BASE EXCESS BLDA CALC-SCNC: 5.1 MMOL/L
BASE EXCESS BLDA CALC-SCNC: 7.5 MMOL/L
BASE EXCESS BLDA CALC-SCNC: 8.1 MMOL/L
BASOPHILS # BLD AUTO: 0.01 THOUSANDS/ÂΜL (ref 0–0.1)
BASOPHILS NFR BLD AUTO: 0 % (ref 0–1)
BUN SERPL-MCNC: 58 MG/DL (ref 5–25)
BUN SERPL-MCNC: 61 MG/DL (ref 5–25)
CA-I BLD-SCNC: 1.1 MMOL/L (ref 1.12–1.32)
CALCIUM SERPL-MCNC: 8.3 MG/DL (ref 8.3–10.1)
CALCIUM SERPL-MCNC: 8.7 MG/DL (ref 8.3–10.1)
CHLORIDE SERPL-SCNC: 103 MMOL/L (ref 96–108)
CHLORIDE SERPL-SCNC: 105 MMOL/L (ref 96–108)
CO2 SERPL-SCNC: 21 MMOL/L (ref 21–32)
CO2 SERPL-SCNC: 34 MMOL/L (ref 21–32)
CREAT SERPL-MCNC: 3.04 MG/DL (ref 0.6–1.3)
CREAT SERPL-MCNC: 3.24 MG/DL (ref 0.6–1.3)
EOSINOPHIL # BLD AUTO: 0.09 THOUSAND/ÂΜL (ref 0–0.61)
EOSINOPHIL NFR BLD AUTO: 1 % (ref 0–6)
ERYTHROCYTE [DISTWIDTH] IN BLOOD BY AUTOMATED COUNT: 15.1 % (ref 11.6–15.1)
GFR SERPL CREATININE-BSD FRML MDRD: 17 ML/MIN/1.73SQ M
GFR SERPL CREATININE-BSD FRML MDRD: 19 ML/MIN/1.73SQ M
GLUCOSE SERPL-MCNC: 127 MG/DL (ref 65–140)
GLUCOSE SERPL-MCNC: 140 MG/DL (ref 65–140)
GLUCOSE SERPL-MCNC: 150 MG/DL (ref 65–140)
GLUCOSE SERPL-MCNC: 196 MG/DL (ref 65–140)
GLUCOSE SERPL-MCNC: 202 MG/DL (ref 65–140)
GLUCOSE SERPL-MCNC: 222 MG/DL (ref 65–140)
HCO3 BLDA-SCNC: 31.3 MMOL/L (ref 22–28)
HCO3 BLDA-SCNC: 34.1 MMOL/L (ref 22–28)
HCO3 BLDA-SCNC: 35.5 MMOL/L (ref 22–28)
HCO3 BLDA-SCNC: 35.6 MMOL/L (ref 22–28)
HCT VFR BLD AUTO: 27.9 % (ref 36.5–49.3)
HGB BLD-MCNC: 7.7 G/DL (ref 12–17)
IMM GRANULOCYTES # BLD AUTO: 0.06 THOUSAND/UL (ref 0–0.2)
IMM GRANULOCYTES NFR BLD AUTO: 1 % (ref 0–2)
IPAP: 16
IPAP: 16
IPAP: 24
LYMPHOCYTES # BLD AUTO: 0.61 THOUSANDS/ÂΜL (ref 0.6–4.47)
LYMPHOCYTES NFR BLD AUTO: 8 % (ref 14–44)
MAGNESIUM SERPL-MCNC: 2.6 MG/DL (ref 1.6–2.6)
MAGNESIUM SERPL-MCNC: 3 MG/DL (ref 1.6–2.6)
MCH RBC QN AUTO: 25.5 PG (ref 26.8–34.3)
MCHC RBC AUTO-ENTMCNC: 27.6 G/DL (ref 31.4–37.4)
MCV RBC AUTO: 92 FL (ref 82–98)
MONOCYTES # BLD AUTO: 0.84 THOUSAND/ÂΜL (ref 0.17–1.22)
MONOCYTES NFR BLD AUTO: 11 % (ref 4–12)
NASAL CANNULA: 4
NEUTROPHILS # BLD AUTO: 6.39 THOUSANDS/ÂΜL (ref 1.85–7.62)
NEUTS SEG NFR BLD AUTO: 79 % (ref 43–75)
NON VENT- BIPAP: ABNORMAL
NRBC BLD AUTO-RTO: 0 /100 WBCS
O2 CT BLDA-SCNC: 10.2 ML/DL (ref 16–23)
O2 CT BLDA-SCNC: 11.1 ML/DL (ref 16–23)
O2 CT BLDA-SCNC: 11.4 ML/DL (ref 16–23)
O2 CT BLDA-SCNC: 11.5 ML/DL (ref 16–23)
OXYHGB MFR BLDA: 92.7 % (ref 94–97)
OXYHGB MFR BLDA: 95.9 % (ref 94–97)
OXYHGB MFR BLDA: 96.3 % (ref 94–97)
OXYHGB MFR BLDA: 96.7 % (ref 94–97)
PCO2 BLDA: 60.7 MM HG (ref 36–44)
PCO2 BLDA: 73.5 MM HG (ref 36–44)
PCO2 BLDA: 75.5 MM HG (ref 36–44)
PCO2 BLDA: 83.5 MM HG (ref 36–44)
PEEP MAX SETTING VENT: 8 CM[H2O]
PH BLDA: 7.23 [PH] (ref 7.35–7.45)
PH BLDA: 7.29 [PH] (ref 7.35–7.45)
PH BLDA: 7.3 [PH] (ref 7.35–7.45)
PH BLDA: 7.33 [PH] (ref 7.35–7.45)
PHOSPHATE SERPL-MCNC: 4.4 MG/DL (ref 2.3–4.1)
PHOSPHATE SERPL-MCNC: 5 MG/DL (ref 2.3–4.1)
PLATELET # BLD AUTO: 103 THOUSANDS/UL (ref 149–390)
PMV BLD AUTO: 11.2 FL (ref 8.9–12.7)
PO2 BLDA: 109.1 MM HG (ref 75–129)
PO2 BLDA: 113.6 MM HG (ref 75–129)
PO2 BLDA: 120.9 MM HG (ref 75–129)
PO2 BLDA: 76.9 MM HG (ref 75–129)
POTASSIUM SERPL-SCNC: 4.7 MMOL/L (ref 3.5–5.3)
POTASSIUM SERPL-SCNC: 4.8 MMOL/L (ref 3.5–5.3)
POTASSIUM SERPL-SCNC: 5.5 MMOL/L (ref 3.5–5.3)
RBC # BLD AUTO: 3.02 MILLION/UL (ref 3.88–5.62)
SODIUM SERPL-SCNC: 134 MMOL/L (ref 135–147)
SODIUM SERPL-SCNC: 140 MMOL/L (ref 135–147)
SPECIMEN SOURCE: ABNORMAL
VENT BIPAP FIO2: 35 %
WBC # BLD AUTO: 8 THOUSAND/UL (ref 4.31–10.16)

## 2022-11-24 RX ADMIN — INSULIN LISPRO 10 UNITS: 100 INJECTION, SOLUTION INTRAVENOUS; SUBCUTANEOUS at 11:31

## 2022-11-24 RX ADMIN — BUDESONIDE 0.5 MG: 0.5 INHALANT ORAL at 07:43

## 2022-11-24 RX ADMIN — CEFEPIME 1000 MG: 1 INJECTION, POWDER, FOR SOLUTION INTRAMUSCULAR; INTRAVENOUS at 11:32

## 2022-11-24 RX ADMIN — LEVALBUTEROL HYDROCHLORIDE 1.25 MG: 1.25 SOLUTION, CONCENTRATE RESPIRATORY (INHALATION) at 13:57

## 2022-11-24 RX ADMIN — BUMETANIDE 1 MG: 0.25 INJECTION, SOLUTION INTRAMUSCULAR; INTRAVENOUS at 09:25

## 2022-11-24 RX ADMIN — FORMOTEROL FUMARATE DIHYDRATE 20 MCG: 20 SOLUTION RESPIRATORY (INHALATION) at 19:10

## 2022-11-24 RX ADMIN — INSULIN LISPRO 10 UNITS: 100 INJECTION, SOLUTION INTRAVENOUS; SUBCUTANEOUS at 09:24

## 2022-11-24 RX ADMIN — CEFEPIME 1000 MG: 1 INJECTION, POWDER, FOR SOLUTION INTRAMUSCULAR; INTRAVENOUS at 00:34

## 2022-11-24 RX ADMIN — INSULIN LISPRO 2 UNITS: 100 INJECTION, SOLUTION INTRAVENOUS; SUBCUTANEOUS at 09:24

## 2022-11-24 RX ADMIN — BUDESONIDE 0.5 MG: 0.5 INHALANT ORAL at 19:10

## 2022-11-24 RX ADMIN — IPRATROPIUM BROMIDE 0.5 MG: 0.5 SOLUTION RESPIRATORY (INHALATION) at 13:57

## 2022-11-24 RX ADMIN — IPRATROPIUM BROMIDE 0.5 MG: 0.5 SOLUTION RESPIRATORY (INHALATION) at 07:43

## 2022-11-24 RX ADMIN — FLUTICASONE PROPIONATE 1 PUFF: 110 AEROSOL, METERED RESPIRATORY (INHALATION) at 09:24

## 2022-11-24 RX ADMIN — LEVALBUTEROL HYDROCHLORIDE 1.25 MG: 1.25 SOLUTION, CONCENTRATE RESPIRATORY (INHALATION) at 19:10

## 2022-11-24 RX ADMIN — IPRATROPIUM BROMIDE 0.5 MG: 0.5 SOLUTION RESPIRATORY (INHALATION) at 19:10

## 2022-11-24 RX ADMIN — IPRATROPIUM BROMIDE 0.5 MG: 0.5 SOLUTION RESPIRATORY (INHALATION) at 00:11

## 2022-11-24 RX ADMIN — LEVALBUTEROL HYDROCHLORIDE 1.25 MG: 1.25 SOLUTION, CONCENTRATE RESPIRATORY (INHALATION) at 00:11

## 2022-11-24 RX ADMIN — FORMOTEROL FUMARATE DIHYDRATE 20 MCG: 20 SOLUTION RESPIRATORY (INHALATION) at 07:43

## 2022-11-24 RX ADMIN — LEVALBUTEROL HYDROCHLORIDE 1.25 MG: 1.25 SOLUTION, CONCENTRATE RESPIRATORY (INHALATION) at 07:42

## 2022-11-24 NOTE — CONSULTS
[facility] - [id number]  78 y o  male MRN: [id number]  [facility] Encounter: [id number]      -------------------------------------------------------------------------------------------------------------  Chief Complaint: Hypercapnic, obtunded    History of Present Illness   HX and PE limited by: obtunded  Author Ankit is a 37-year-old male with a past medical history of severe COPD, diastolic CHF, atrial fibrillation on Eliquis, lung cancer status post SBRT, type 2 diabetes, stage 3b CKD, GERD, CAD and hypertension who presented to the emergency department 11/20 complaining of worsening shortness of breath   His worsening hypoxic respiratory failure was thought to be secondary to acute CHF exacerbation versus COPD exacerbation versus possible underlying pneumonia   He was placed on high-flow nasal cannula in the emergency department as well as a nitro drip for elevated blood pressure and Lasix   The patient met SIRS criteria and was started on antibiotics and blood cultures were drawn  [facility] was also found to have a new near complete occlusion of the middle lobe on the right lower lobe bronchial branches with atelectasis on CT chest   He remained on high-flow nasal cannula and so he was brought to the ICCU for further monitoring  The ICCU the patient was placed on BiPAP due to worsening hypoxic and hypercapnic respiratory failure  Blood and urine cultures were positive for Pseudomonas and he was continued on cefepime and vancomycin was discontinued  The patient's clinical status slowly improved and he was eventually weaned off of BiPAP and down to 3 L nasal cannula  He was later deemed medically stable for downgrade to [facility]  On 11/24 critical care team was alerted to assess patient  Per review of notes from day patient was instructed to be compliant with BiPAP on previous night    Per nursing, patient was not compliant overnight he was found to be increasingly lethargic and the morning of 11/24  Initial ABG showed the patient was in respiratory acidosis with a pH of 7 2-9  Patient had elevated pCO2 of 83 5  Respiratory therapy was contacted and patient was subsequently placed back on BiPAP  Follow-up ABG few hours after initiating BiPAP revealed a pH improved but still acidotic at 7 3  Patient had slightly improved pCO2 is 73 5 at that time as well  Patient had repeat chest x-ray today which is concerning for a possible new left basilar pneumonia, the patient already on antibiotic therapy  After evaluation of critical care team, patient is appropriate for step-down level 1  Patient was acutely obtunded  Patient responded to sternal rub and at that point was able to follow basic commands such as squeezing hands  Perfect patient had not taken any narcotics today, blood sugars acceptable and without concern for hypoglycemia  No metabolic derangements  Updated level of care, will continue BiPAP for patient overnight  Stat head CT pending as patient with acute change in mental status and on Eliquis  History obtained from chart review  -------------------------------------------------------------------------------------------------------------  Assessment and Plan:    Neuro:   • Diagnosis:  Toxic metabolic encephalopathy  ? Likely secondary to acute hypoxic, hypercarbic respiratory failure with possible contraction alkalosis  ? Continue BiPAP mask as able  Though patient persistently noncompliant with BiPAP overnight  ? Monitor ABG closely  ? Continue delirium precautions  ? 11/24 patient obtunded, noncompliant with BiPAP overnight  F/u STAT Head CT to rule out bleed as patient is on Eliquis          CV:   • Diagnosis:  AFib  ? Plan:  Currently NSR  ? Continue home Eliquis and carvedilol  ? Continue telemetry monitoring  • Diagnosis:  CAD  ? Plan:  Status post stent placement in 2015  ? Stress test negative in May of this year  ?  Continue aspirin, statin, beta-blocker  • Diagnosis:  HTN  ? Plan:  Monitor blood pressure closely  ? Continue carvedilol  ? Continue Bumex  • Diagnosis:  HLD  ? Plan:  Continue statin  • Diagnosis:  Acute on chronic diastolic heart failure  ? Plan:  Continue Bumex  ? Continue fluid restriction and sodium restriction  ? Monitor I's and O's and daily weights        Pulm:  • Diagnosis:  Pneumonia  ? Plan: CXR showed some concern for pneumonia  ? CT chest earlier in admission showed increase in right pleural effusion with new small left effusion, new near complete occlusion of right middle lobe and right lower lobe bronchial branches with atelectasis and new small perihepatic ascites  ? Follow-up blood culture, MRSA culture and urine culture  - Blood and urine culture positive for Pseudomonas -> follow-up sensitivities  - Will continue cefepime  ? Monitor WBC count and fever curve  • Diagnosis:  Lung adenocarcinoma  ? Plan: s/p resection and radiation therapy in the past  • Diagnosis:  COPD  ? Plan:  History of COPD on 2 L NC at baseline  ? Continue nebulizers with Xopenex and Atrovent  - Will add Perforomist and budesonide b i d   ? Continue to wean supplemental oxygen as able  • Diagnosis:  Acute on chronic respiratory failure with hypoxia and hypercapnia  ? Plan:  Likely secondary to pneumonia versus CHF exacerbation versus COPD exacerbation  ? Continue plan for pneumonia as above  ? Monitor ABG  ? Continue BIPAP as able, though patient persistently noncompliant overnight  ? Continue Bumex  ? Monitor I's and O's and daily weights  ? Wean supplemental oxygen as able  ? Monitor respiratory status closely        GI:   • Diagnosis:  No active issues        :   • Diagnosis:  Stage IIIB CKD  ? Plan:  Baseline creatinine appears to be from 1 8-2  ? On admission creatinine was at baseline at 2 01  ? Monitor BMP closely  ?  Avoid nephrotoxic medications        F/E/N:   • Plan:  Monitor daily BMP        Heme/Onc:   • Diagnosis:  Lung adenocarcinoma  ? Plan: s/p resection and radiation therapy in the past        Endo:   • Diagnosis:  Type 2 diabetes mellitus  ? Plan:  Home insulin regimen includes 30 units of long-acting at bedtime and 10 units short-acting with meals  ? Continue basal/prandial insulin with additional SSI coverage  ? Gabapentin for neuropathy  ? Monitor blood sugars closely        ID:   • Diagnosis:  Sepsis  ? Plan:  Patient presented with leukocytosis and tachypnea  ? Sources likely secondary to pneumonia vs UTI  - Blood and urine cultures growing Pseudomonas  ? Continue IV antibiotics with cefepime  ? Monitor WBC count and fever curve  ? Follow-up culture sensitivities  ? Case management following regarding possible need for long-term placement -> patient reportedly faces of action due to hoarding behaviors         MSK/Skin:   ? Diagnosis:  PT/OT consulted  - OOB when able  - Frequent turning and repositioning    Disposition: Admit to Stepdown Level 1  Code Status: Level 1 - Full Code  --------------------------------------------------------------------------------------------------------------  Review of Systems   Unable to perform ROS: Mental status change       Review of systems was unable to be performed secondary to patient acutely obtunded    Physical Exam  Constitutional:       General: He is not in acute distress  Appearance: He is obese  He is not ill-appearing  Cardiovascular:      Rate and Rhythm: Normal rate and regular rhythm  Pulses: Normal pulses  Heart sounds: Normal heart sounds  No murmur heard  Pulmonary:      Effort: Pulmonary effort is normal  No respiratory distress  Breath sounds: Normal breath sounds  Comments: BiPAP in place  Abdominal:      General: Abdomen is flat  Bowel sounds are normal  There is no distension  Palpations: Abdomen is soft  Tenderness: There is no abdominal tenderness  Musculoskeletal:      Right lower leg: No edema        Left lower leg: No edema    Skin:     General: Skin is warm and dry  Neurological:      Mental Status: He is disoriented  Comments: Patient acutely obtunded due to hypercapnia  Alert to sternal rub, then able to follow basic commands like squeeze hands  --------------------------------------------------------------------------------------------------------------  Vitals:   Vitals:    11/23/22 2234 11/24/22 0012 11/24/22 0747 11/24/22 1502   BP: 113/53  136/62 117/57   BP Location: Right arm  Right arm    Pulse: 82  90 93   Resp: 18  16    Temp: 97 9 °F (36 6 °C)  97 9 °F (36 6 °C) (!) 97 3 °F (36 3 °C)   TempSrc: Temporal  Temporal    SpO2: 97% 100% 99% 100%   Weight:       Height:         Temp  Min: 97 3 °F (36 3 °C)  Max: 100 6 °F (38 1 °C)  IBW (Ideal Body Weight): 77 6 kg  Height: 6' (182 9 cm)  Body mass index is 32 56 kg/m²    PAP:  , PAP mean:   , CVP:  , PCWP:   , SvO2:   , ScvO2:  , CO:  , CI:  , SVR:  , SVRI:  , PVR:  , SV:  , SVI:  , ICP Mean:  , CPP:      Laboratory and Diagnostics:  Results from last 7 days   Lab Units 11/24/22  0607 11/23/22  0451 11/22/22  0521 11/21/22  0636 11/20/22  1102   WBC Thousand/uL 8 00 10 58* 12 99* 17 90* 17 88*   HEMOGLOBIN g/dL 7 7* 7 8* 7 1* 7 5* 8 7*   HEMATOCRIT % 27 9* 26 5* 24 8* 26 0* 29 6*   PLATELETS Thousands/uL 103* 143* 132* 153 183   NEUTROS PCT % 79*  --   --  89* 90*   MONOS PCT % 11  --   --  7 8     Results from last 7 days   Lab Units 11/24/22  1054 11/24/22  0607 11/23/22  0451 11/22/22  0521 11/21/22  0636 11/20/22  1102   SODIUM mmol/L  --  134* 137 137 138 137   POTASSIUM mmol/L 4 7 5 5* 4 5 4 2 4 0 4 1   CHLORIDE mmol/L  --  105 101 102 101 102   CO2 mmol/L  --  21 30 31 33* 29   ANION GAP mmol/L  --  8 6 4 4 6   BUN mg/dL  --  61* 60* 46* 38* 31*   CREATININE mg/dL  --  3 24* 3 26* 2 72* 2 32* 2 01*   CALCIUM mg/dL  --  8 3 8 4 8 3 8 3 8 4   GLUCOSE RANDOM mg/dL  --  196* 250* 258* 257* 375*   ALT U/L  --   --   --   --  10* 12   AST U/L  --   --   -- --  9 7   ALK PHOS U/L  --   --   --   --  69 84   ALBUMIN g/dL  --   --   --   --  2 2* 2 7*   TOTAL BILIRUBIN mg/dL  --   --   --   --  0 42 0 47     Results from last 7 days   Lab Units 11/24/22  0607 11/22/22  0521 11/21/22  0636 11/20/22  2241   MAGNESIUM mg/dL 3 0* 2 5 2 3  --    PHOSPHORUS mg/dL 5 0* 3 8 4 3* 4 4*      Results from last 7 days   Lab Units 11/20/22  1235   INR  1 08   PTT seconds 26          Results from last 7 days   Lab Units 11/20/22  2243 11/20/22  1235   LACTIC ACID mmol/L 1 5 2 0     ABG:  Results from last 7 days   Lab Units 11/24/22  1628   PH ART  7 290*   PCO2 ART mm Hg 75 5*   PO2 ART mm Hg 109 1   HCO3 ART mmol/L 35 5*   BASE EXC ART mmol/L 7 5   ABG SOURCE  Radial, Left     VBG:  Results from last 7 days   Lab Units 11/24/22  1628 11/21/22  1553 11/20/22  2329   PH MAYE   --   --  7 288*   PCO2 MAYE mm Hg  --   --  67 2*   PO2 MAYE mm Hg  --   --  54 0*   HCO3 MAYE mmol/L  --   --  31 4*   BASE EXC MAYE mmol/L  --   --  4 0   ABG SOURCE  Radial, Left   < >  --     < > = values in this interval not displayed  Results from last 7 days   Lab Units 11/23/22  0451 11/21/22  0636 11/20/22  1102   PROCALCITONIN ng/ml 1 37* 2 67* 0 26*       Micro:  Results from last 7 days   Lab Units 11/23/22  1804 11/22/22  1700 11/20/22  1322 11/20/22  1256 11/20/22  1245 11/20/22  1220   BLOOD CULTURE   --  No Growth at 24 hrs  No Growth at 24 hrs   --   --  Pseudomonas aeruginosa* Pseudomonas aeruginosa*   GRAM STAIN RESULT  1+ Polys  --   --   --  Gram negative rods* Gram negative rods*   URINE CULTURE   --   --  60,000-69,000 cfu/ml Pseudomonas aeruginosa*  --   --   --    BODY FLUID CULTURE, STERILE  No growth  --   --   --   --   --    MRSA CULTURE ONLY   --   --   --  No Methicillin Resistant Staphlyococcus aureus (MRSA) isolated  --   --       I have personally reviewed pertinent reports        Historical Information   Past Medical History:   Diagnosis Date   • Abdominal pain    • MARANDA (acute kidney injury) ([facility] [address] ) 2018   • Cardiac disease    • CHF (congestive heart failure) (HCC)    • COPD, severe (HCC)    • Coronary artery disease    • DDD (degenerative disc disease), lumbar 2020   • Diabetes mellitus (HCC)    • Dyspnea    • GERD (gastroesophageal reflux disease)    • Hyperlipidemia    • Hypertension    • MI (myocardial infarction) ([facility] [address] )     with 3 stents   • Nodule of apex of right lung    • JACQUELINE (obstructive sleep apnea)    • Prostate cancer [facility])      Past Surgical History:   Procedure Laterality Date   • ABDOMINAL SURGERY      exploratory   • ANGIOPLASTY      3 stents   • APPENDECTOMY     • COLONOSCOPY     • CT NEEDLE BIOPSY LUNG  11/3/2020   • ESOPHAGOGASTRODUODENOSCOPY N/A 10/2/2017    Procedure: ESOPHAGOGASTRODUODENOSCOPY (EGD); Surgeon: Thomas Simon MD;  Location:  GI LAB;   Service: Gastroenterology   • IR THORACENTESIS  11/3/2020   • IR THORACENTESIS  2022   • IR THORACENTESIS  2022   • KNEE CARTILAGE SURGERY     • OTHER SURGICAL HISTORY      stent placement   • PROSTATE SURGERY     • SKIN GRAFT      Basal cell CA back     Social History   Social History     Substance and Sexual Activity   Alcohol Use Never     Social History     Substance and Sexual Activity   Drug Use No     Social History     Tobacco Use   Smoking Status Former   • Packs/day: 1 50   • Years: 50 00   • Pack years: 75 00   • Types: Cigarettes   • Start date: 38   • Quit date: 2020   • Years since quittin 3   Smokeless Tobacco Never     Family History:   Family History   Problem Relation Age of Onset   • Heart disease Father    • Other Father         Mesothelioma      I have reviewed this patient's family history and commented on sigificant items within the HPI      Medications:  Current Facility-Administered Medications   Medication Dose Route Frequency   • apixaban (ELIQUIS) tablet 5 mg  5 mg Oral BID   • aspirin (ECOTRIN LOW STRENGTH) EC tablet 81 mg  81 mg Oral Daily   • budesonide (PULMICORT) inhalation solution 0 5 mg  0 5 mg Nebulization Q12H   • bumetanide (BUMEX) injection 1 mg  1 mg Intravenous BID   • carvedilol (COREG) tablet 6 25 mg  6 25 mg Oral BID With Meals   • cefepime (MAXIPIME) 1,000 mg in dextrose 5 % 50 mL IVPB  1,000 mg Intravenous Q12H   • fluticasone (FLOVENT HFA) 110 MCG/ACT inhaler 1 puff  1 puff Inhalation Q12H [address]   • formoterol (PERFOROMIST) nebulizer solution 20 mcg  20 mcg Nebulization Q12H   • gabapentin (NEURONTIN) capsule 100 mg  100 mg Oral TID   • insulin glargine (LANTUS) subcutaneous injection 40 Units 0 4 mL  40 Units Subcutaneous HS   • insulin lispro (HumaLOG) 100 units/mL subcutaneous injection 1-6 Units  1-6 Units Subcutaneous TID AC   • insulin lispro (HumaLOG) 100 units/mL subcutaneous injection 1-6 Units  1-6 Units Subcutaneous HS   • insulin lispro (HumaLOG) 100 units/mL subcutaneous injection 10 Units  10 Units Subcutaneous Daily With Breakfast   • insulin lispro (HumaLOG) 100 units/mL subcutaneous injection 10 Units  10 Units Subcutaneous Daily With Lunch   • insulin lispro (HumaLOG) 100 units/mL subcutaneous injection 10 Units  10 Units Subcutaneous Daily With Dinner   • ipratropium (ATROVENT) 0 02 % inhalation solution 0 5 mg  0 5 mg Nebulization Q6H   • isosorbide mononitrate (IMDUR) 24 hr tablet 30 mg  30 mg Oral Daily   • levalbuterol (XOPENEX) inhalation solution 1 25 mg  1 25 mg Nebulization Q6H   • melatonin tablet 3 mg  3 mg Oral HS   • ondansetron (ZOFRAN) injection 4 mg  4 mg Intravenous Q6H PRN   • pantoprazole (PROTONIX) EC tablet 40 mg  40 mg Oral BID AC   • pravastatin (PRAVACHOL) tablet 80 mg  80 mg Oral Daily With Dinner   • senna-docusate sodium (SENOKOT S) 8 6-50 mg per tablet 1 tablet  1 tablet Oral HS   • sodium chloride (OCEAN) 0 65 % nasal spray 1 spray  1 spray Each Nare Q1H PRN   • tamsulosin (FLOMAX) capsule 0 4 mg  0 4 mg Oral Daily With Dinner     Home medications:  Prior to Admission Medications   Prescriptions Last Dose Informant Patient Reported? Taking? Alcohol Swabs 70 % PADS   No No   Sig: May substitute brand based on insurance coverage  Check glucose TID  Blood Glucose Monitoring Suppl (OneTouch Verio Reflect) w/Device KIT   No No   Sig: May substitute brand based on insurance coverage  Check glucose TID  Insulin Glargine Solostar (Lantus SoloStar) 100 UNIT/ML SOPN   No Yes   Sig: Inject 0 3 mL (30 Units total) under the skin daily at bedtime   Insulin Pen Needle (BD Pen Needle Beena 2nd Gen) 32G X 4 MM MISC   No No   Sig: For use with insulin pen  Pharmacy may dispense brand covered by insurance  Insulin Pen Needle (BD Pen Needle Beena 2nd Gen) 32G X 4 MM MISC   No No   Sig: For use with insulin pen  Pharmacy may dispense brand covered by insurance  OneTouch Delica Lancets 46U MISC   No No   Sig: May substitute brand based on insurance coverage  Check glucose TID  apixaban (ELIQUIS) 5 mg   No Yes   Sig: Take 1 tablet (5 mg total) by mouth 2 (two) times a day   aspirin (ECOTRIN LOW STRENGTH) 81 mg EC tablet   No Yes   Sig: Take 1 tablet (81 mg total) by mouth daily   carvedilol (COREG) 6 25 mg tablet   No Yes   Sig: Take 1 tablet (6 25 mg total) by mouth 2 (two) times a day with meals   cyclobenzaprine (FLEXERIL) 10 mg tablet   No Yes   Sig: Take 1 tablet (10 mg total) by mouth 2 (two) times a day as needed for muscle spasms   ferrous sulfate 325 (65 Fe) mg tablet   No Yes   Sig: Take 1 tablet (325 mg total) by mouth daily with breakfast   fluticasone-vilanterol (BREO ELLIPTA) 100-25 mcg/inh inhaler   No Yes   Sig: Inhale 1 puff daily Rinse mouth after use  furosemide (LASIX) 40 mg tablet   No Yes   Sig: Take 1 tablet (40 mg total) by mouth daily   gabapentin (NEURONTIN) 100 mg capsule   No Yes   Sig: Take 1 capsule (100 mg total) by mouth 3 (three) times a day   glucose blood (OneTouch Verio) test strip   No No   Sig: May substitute brand based on insurance coverage  Check glucose TID     glucose blood test strip   No No   Sig: Use 1 each daily as needed (check sugars) Use as instructed  Uses One Touch Ultra test strip   insulin lispro (HumaLOG KwikPen) 100 units/mL injection pen   No Yes   Sig: Inject 10 Units under the skin 3 (three) times a day with meals   isosorbide mononitrate (IMDUR) 30 mg 24 hr tablet   No Yes   Sig: Take 1 tablet (30 mg total) by mouth daily   pantoprazole (PROTONIX) 40 mg tablet   No Yes   Sig: Take 1 tablet (40 mg total) by mouth 2 (two) times a day before meals   simvastatin (ZOCOR) 40 mg tablet   No Yes   Sig: Take 1 tablet (40 mg total) by mouth daily   tamsulosin (FLOMAX) 0 4 mg   No Yes   Sig: Take 1 capsule (0 4 mg total) by mouth daily with dinner   umeclidinium bromide (INCRUSE ELLIPTA) 62 5 mcg/inh AEPB inhaler   No Yes   Sig: Inhale 1 puff daily Do not start before October 16, 2022  Facility-Administered Medications: None     Allergies: Allergies   Allergen Reactions   • Crestor [Rosuvastatin] Other (See Comments)     Unknown     • Lisinopril GI Intolerance, Dizziness and Abdominal Pain   • Metformin GI Intolerance     ------------------------------------------------------------------------------------------------------------  Advance Directive and Living Will:      Power of :    POLST:    ------------------------------------------------------------------------------------------------------------  Anticipated Length of Stay is > 2 midnights    Care Time Delivered:   Upon my evaluation, this patient had a high probability of imminent or life-threatening deterioration due to hypercapnia, obtunded, which required my direct attention, intervention, and personal management  I have personally provided 30 minutes (1700 to 1730) of critical care time, exclusive of procedures, teaching, family meetings, and any prior time recorded by providers other than myself  Dhiraj Sierra DO        Portions of the record may have been created with voice recognition software  Occasional wrong word or "sound a like" substitutions may have occurred due to the inherent limitations of voice recognition software    Read the chart carefully and recognize, using context, where substitutions have occurred

## 2022-11-24 NOTE — PROGRESS NOTES
Progress Note - Infectious Disease   Pavel Webster Sr  78 y o  male MRN: [id number]  Unit/Bed#: [facility] 904-01 Encounter: [id number]      Impression/Plan:  1  Sepsis-present on admission  Leukocytosis and tachypnea  Appears to be secondary to Pseudomonas bacteremia from urinary tract infection  No other definitive source appreciated  The patient has remained hemodynamically stable and seems to be tolerating the antibiotics without difficulty  -continue antibiotics as below  -recheck CBC with diff and BMP  -supportive care     2  Pseudomonas bacteremia-appears to be secondary to urinary tract infection in the setting of urinary retention  No other clear source appreciated  Could represent bacteremia from a pneumonia but the patient's pulmonary status seems to be around baseline and the CT scan has some chronic findings of the right base  Repeat blood cultures negative thus far  -continue cefepime   -follow up repeat blood cultures to confirm clearance     3  Pseudomonas UTI-in the setting of urinary retention  The patient now has a Cook catheter in place  Consideration for the possibility of ongoing obstruction/hydronephrosis  Consideration for the possibility of abscess  -antibiotics as above  -check renal ultrasound  -Cook drainage  -monitor symptomatology       4  Acute kidney injury-complicating chronic kidney disease  Renal function worsening  Suspected multifactorial including cardiorenal syndrome, sepsis, and possible ATN  Pre renal issues may be playing a role   -dose adjust antibiotics as needed  -recheck BMP  -volume management  -check renal ultrasound  -close nephrology follow-up     5  Acute hypoxic respiratory failure-with increased pleural effusion on the setting of known adenocarcinoma of the lung  Volume loss the right base seems somewhat chronic  His respiratory status has deteriorated and he is now back on BiPAP    He underwent thoracentesis on 11/23/2022  -follow-up pleural fluid cytology and culture  -BiPAP support  -recheck chest x-ray  -monitor respiratory status     6  Acute encephalopathy-suspect multifactorial including metabolic issues, sepsis  Waxing waning but now with decreased cognition in the setting of CO2 retention  -treatment of respiratory failure  -monitor cognition     7  Diabetes mellitus-type 2 with hyperglycemia with long-term insulin use     Discussed the above management plan with the primary service    Antibiotics:  Cefepime 5    Subjective:  Patient has no fever, chills, sweats; no nausea, vomiting, diarrhea; no cough, shortness of breath; no pain  No new symptoms  He has developed CO2 retention is now on BiPAP  He underwent thoracentesis yesterday    Objective:  Vitals:  Temp:  [97 5 °F (36 4 °C)-97 9 °F (36 6 °C)] 97 9 °F (36 6 °C)  HR:  [77-90] 90  Resp:  [16-18] 16  BP: (113-136)/(51-63) 136/62  SpO2:  [96 %-100 %] 99 %  Temp (24hrs), Av 8 °F (36 6 °C), Min:97 5 °F (36 4 °C), Max:97 9 °F (36 6 °C)  Current: Temperature: 97 9 °F (36 6 °C)    Physical Exam:   General Appearance:  Somnolent, resting comfortably, nontoxic, no acute distress  Throat: Oropharynx moist without lesions  On BiPAP   Lungs:   Decreased breath sounds bilaterally bilaterally; no wheezes, rhonchi or rales; respirations unlabored   Heart:  RRR; no murmur, rub or gallop   Abdomen:   Soft, non-tender, non-distended, positive bowel sounds  Extremities: No clubbing, cyanosis or edema   Skin: No new rashes or lesions  No draining wounds noted         Labs, Imaging, & Other studies:   All pertinent labs and imaging studies were personally reviewed  Results from last 7 days   Lab Units 22  0607 22  0451 22  0521   WBC Thousand/uL 8 00 10 58* 12 99*   HEMOGLOBIN g/dL 7 7* 7 8* 7 1*   PLATELETS Thousands/uL 103* 143* 132*     Results from last 7 days   Lab Units 22  1054 22  0607 22  0451 22  0521 22  0636 22  1102   SODIUM mmol/L  -- 134* 137 137 138 137   POTASSIUM mmol/L 4 7 5 5* 4 5 4 2 4 0 4 1   CHLORIDE mmol/L  --  105 101 102 101 102   CO2 mmol/L  --  21 30 31 33* 29   BUN mg/dL  --  61* 60* 46* 38* 31*   CREATININE mg/dL  --  3 24* 3 26* 2 72* 2 32* 2 01*   EGFR ml/min/1 73sq m  --  17 17 21 26 31   CALCIUM mg/dL  --  8 3 8 4 8 3 8 3 8 4   AST U/L  --   --   --   --  9 7   ALT U/L  --   --   --   --  10* 12   ALK PHOS U/L  --   --   --   --  69 84     Results from last 7 days   Lab Units 11/23/22  1804 11/22/22  1700 11/20/22  1322 11/20/22  1256 11/20/22  1245 11/20/22  1220   BLOOD CULTURE   --  No Growth at 24 hrs    No Growth at 24 hrs   --   --  Pseudomonas aeruginosa* Pseudomonas aeruginosa*   GRAM STAIN RESULT  1+ Polys  --   --   --  Gram negative rods* Gram negative rods*   URINE CULTURE   --   --  60,000-69,000 cfu/ml Pseudomonas aeruginosa*  --   --   --    MRSA CULTURE ONLY   --   --   --  No Methicillin Resistant Staphlyococcus aureus (MRSA) isolated  --   --      Results from last 7 days   Lab Units 11/23/22  0451 11/21/22  0636 11/20/22  1102   PROCALCITONIN ng/ml 1 37* 2 67* 0 26*         Results from last 7 days   Lab Units 11/22/22  1703   FERRITIN ng/mL 63

## 2022-11-24 NOTE — PROGRESS NOTES
[facility]  Progress Note - Dakota Ibarra Sr  1945, 78 y o  male MRN: [id number]  Unit/Bed#: [facility] 904-01 Encounter: [id number]  Primary Care Provider: Anu Paulson MD   Date and time admitted to hospital: 11/20/2022 10:40 AM       * Sepsis (evolving)  Assessment & Plan  • Likely secondary to UTI/bacteremia from Pseudomonas  • Evolving SIRS criteria including leukocytosis/tachycardia/tachypnea and intermittent fever at various points of hospitalization  • Await repeat blood cultures from 11/22  • Procalcitonin trend:  2 67 peak -> 1 37 yesterday -> recheck tomorrow   • Monitor vitals and maintain hemodynamics - transferred out of the ICU on 11/22      Acute on chronic respiratory failure with hypoxia and hypercapnia   Assessment & Plan  • Multifactorial in the setting of underlying diastolic CHF, recurrent/persistent right pleural effusion, underlying COPD, and adenocarcinoma of lung, with suspected obstructive sleep apnea  • Baseline oxygen requirement of 2 L -> currently weaned down to 3 L from a peak of 12 L previously  • Instructed to remain compliant with BiPAP at night -> per nursing, was not compliant overnight and became increasingly lethargic this morning -> initial ABG revealed respiratory acidosis(pH of 7 229) with an elevated pCO2 of 83 5 in placed back on BiPAP -> follow-up ABG a few hours later revealed a pH still acidotic at 7 3, however, slightly improved pCO2 at 73 5 - elevated HC03 of 34 1 and 35 6 respectively noted indicative of a degree of metabolic compensation and likelihood of a degree of chronic CO2 retention   • Repeat CXR today concerning for a possible new left basilar pneumonia forming -> already on antibiotic therapy  • If requires a continuous BIPAP need, will reach out to the critical care team to evaluate for upgrade to a step-down unit    Bacteremia secondary to Pseudomonas  Assessment & Plan  • Likely associated with UTI (see below) • Blood cultures from 11/20 growing Pseudomonas -> follow-up repeat cultures from 11/22  • Continue IV Cefepime course    Urinary tract infection  Assessment & Plan  • Urine culture growing Pseudomonas  • Continue IV Cefepime - Infectious Disease following    Right pleural effusion  Assessment & Plan  • Patient with documented chronic recurrent right-sided pleural effusions  • S/p thoracentesis on 11/8/22 yielding approximately 770 mL of clear yellow fluid  • Follow-up CXRs reveal persistent moderate right-sided effusion -> in the setting of above baseline hypoxia, underwent repeat thoracocentesis by IR yesterday yielding approximately 900 mL of fluid -> pending fluid culture and cytology    Acute on chronic diastolic heart failure   Assessment & Plan  • Known diastolic CHF  ? Last echocardiogram from 5/22: Left ventricular cavity size is normal  Wall thickness is increased  The left ventricular ejection fraction is 60%  Systolic function is normal  Wall motion is normal  Diastolic function is normal   ? Continue IV Bumex for diuresis - continue beta-blockade w/ Toprol-XL  ?  Monitor/replete serum potassium/magnesium deficiencies if present     Abnormal CT of chest  Assessment & Plan  • CT imaging initially showed a new near complete occlusion of middle lobe and right lower lobe bronchial branches with atelectasis   • Serial imaging (repeat CXR on 11/21) revealed persistent/moderate right pleural effusion  • Discussed w/ Infectious disease -> less likely suspicion for pneumonia -> consider malignant effusion in setting of previously known right-sided adenocarcinoma (See plan for effusion)     Anemia of chronic disease  Assessment & Plan  • Monitor H/H  • Transfuse if hemoglobin < 7 0     Atrial fibrillation  Assessment & Plan  • Rate controlled on Coreg  • Continue Eliquis for anticoagulation     Acute kidney injury superimposed on chronic kidney disease stage 3   Assessment & Plan  • Baseline creatinine has fluctuated/increased over the last few years has currently progressed to around 2 3-2 7 (previously around 1 3-1 5) - currently elevated at 3 24  • Appreciate nephrology input -> await renal/bladder ultrasound  • Likely in the setting of sepsis/UTI  • Monitor renal function and urine output - limit/avoid nephrotoxins as possible - avoid hypotension as possible     Adenocarcinoma of lung  Assessment & Plan  • S/p resection and subsequent radiation  • Outpatient follow-up w/ oncology    Essential hypertension  Assessment & Plan  • Continue Coreg/Imdur     Insulin-dependent diabetes mellitus with neuropathy  Assessment & Plan        Lab Results   Component Value Date     HGBA1C 9 7 (H) 10/10/2022     • Continue basal/prandial insulin w/ additional SSI coverage per Accu-Cheks  • Carbohydrate restricted diet  • Hypoglycemia protocol  • Continue Gabapentin for neuropathy     Coronary artery disease  Assessment & Plan  • S/p prior coronary stenting  • Continue ASA/statin/Coreg/Imdur     COPD (chronic obstructive pulmonary disease)  Assessment & Plan  • Continue Pulmicort/Perforomist - continue nebulization treatments  • Maintain oxygenation as necessary    Thrombocytopenia  Assessment & Plan  • Likely reactive due to acute medical issue(s)  • Monitor platelet count - monitor for bleeding    GERD  Assessment & Plan  • Continue PPI regimen      DVT Prophylaxis:  Eliquis     Patient Centered Rounds:  I have performed bedside rounds and discussed plan of care with nursing today  Discussions with Specialists or Other Care Team Provider:  see above assessments if applicable    Education and Discussions with Family / Patient:  Patient at bedside, along with son, Tatum Vazquez, over the phone this afternoon    Time Spent for Care:  32 minutes  More than 50% of total time spent on counseling and coordination of care as described above      Current Length of Stay: 4 day(s)  Current Patient Status: Inpatient   Certification Statement: Patient will continue to require additional hospital stay due to assessments as noted above  Code Status: Level 1 - Full Code        Subjective:     Encountered earlier today with respiratory therapy  Remains weak/fatigued and minimally arousable despite being placed on a BiPAP machine  Objective:     Vitals:   Temp (24hrs), Av 7 °F (36 5 °C), Min:97 3 °F (36 3 °C), Max:97 9 °F (36 6 °C)    Temp:  [97 3 °F (36 3 °C)-97 9 °F (36 6 °C)] 97 3 °F (36 3 °C)  HR:  [77-93] 93  Resp:  [16-18] 16  BP: (113-136)/(53-63) 117/57  SpO2:  [96 %-100 %] 100 %  Body mass index is 32 56 kg/m²  Input and Output Summary (last 24 hours):        Intake/Output Summary (Last 24 hours) at 2022 1633  Last data filed at 2022 1502  Gross per 24 hour   Intake 50 ml   Output 2750 ml   Net -2700 ml       Physical Exam:     GENERAL:  Remains weak/fatigued  HEAD:  Normocephalic - atraumatic  MOUTH:  Mucosa moist  NECK:  Supple - no adenopathy  CARDIAC:  Rate controlled - S1/S2 positive  PULMONARY:  Diminished bilaterally more prominent in the right basilar region - placed on BiPAP  ABDOMEN:  Soft - nontender/nondistended - active bowel sounds  MUSCULOSKELETAL:  Motor strength/range of motion quite deconditioned - bilateral distal LE pitting edema w/ hyperpigmented stasis changes  NEUROLOGIC:  Lethargic and minimally arousable to sternal rub  SKIN:  Chronic wrinkles/blemishes       Additional Data:     Labs & Recent Cultures:    Results from last 7 days   Lab Units 22  0607   WBC Thousand/uL 8 00   HEMOGLOBIN g/dL 7 7*   HEMATOCRIT % 27 9*   PLATELETS Thousands/uL 103*   NEUTROS PCT % 79*   LYMPHS PCT % 8*   MONOS PCT % 11   EOS PCT % 1     Results from last 7 days   Lab Units 22  1054 22  0607 22  0521 22  0636   POTASSIUM mmol/L 4 7 5 5*   < > 4 0   CHLORIDE mmol/L  --  105   < > 101   CO2 mmol/L  --  21   < > 33*   BUN mg/dL  --  61*   < > 38*   CREATININE mg/dL  --  3 24*   < > 2 32* CALCIUM mg/dL  --  8 3   < > 8 3   ALK PHOS U/L  --   --   --  69   ALT U/L  --   --   --  10*   AST U/L  --   --   --  9    < > = values in this interval not displayed  Results from last 7 days   Lab Units 11/20/22  1235   INR  1 08     Results from last 7 days   Lab Units 11/24/22  1550 11/24/22  1109 11/24/22  0734 11/23/22  2048 11/23/22  1634 11/23/22  1122 11/23/22  0743 11/22/22  2053 11/22/22  1615 11/22/22  1145 11/22/22  0527 11/21/22  2102   POC GLUCOSE mg/dl 127 222* 202* 248* 253* 302* 241* 328* 356* 241* 274* 238*         Results from last 7 days   Lab Units 11/23/22  0451 11/21/22  0636 11/20/22  2243 11/20/22  1235 11/20/22  1102   LACTIC ACID mmol/L  --   --  1 5 2 0  --    PROCALCITONIN ng/ml 1 37* 2 67*  --   --  0 26*         Results from last 7 days   Lab Units 11/23/22  1804 11/22/22  1700 11/20/22  1322 11/20/22  1245 11/20/22  1220   BLOOD CULTURE   --  No Growth at 24 hrs  No Growth at 24 hrs  --  Pseudomonas aeruginosa* Pseudomonas aeruginosa*   GRAM STAIN RESULT  1+ Polys  --   --  Gram negative rods* Gram negative rods*   URINE CULTURE   --   --  60,000-69,000 cfu/ml Pseudomonas aeruginosa*  --   --    BODY FLUID CULTURE, STERILE  No growth  --   --   --   --          Lines/Drains:  Invasive Devices     Peripheral Intravenous Line  Duration           Peripheral IV 11/21/22 Dorsal (posterior); Right Hand 3 days    Peripheral IV 11/24/22 Left Antecubital <1 day          Drain  Duration           Urethral Catheter Straight-tip 18 Fr  4 days                  Last 24 Hours Medication List:   Current Facility-Administered Medications   Medication Dose Route Frequency Provider Last Rate   • apixaban  5 mg Oral BID Malorie Yee MD     • aspirin  81 mg Oral Daily Malorie Yee MD     • budesonide  0 5 mg Nebulization Q12H Malorie Yee MD     • bumetanide  1 mg Intravenous BID Eliot Guerra MD     • carvedilol  6 25 mg Oral BID With Meals Malorie Yee MD     • cefepime 1,000 mg Intravenous Q12H Drake Beatty MD 1,000 mg (11/24/22 1132)   • fluticasone  1 puff Inhalation Q12H [facility] Alta Aquino MD     • formoterol  20 mcg Nebulization Q12H Alta Aquino MD     • gabapentin  100 mg Oral TID Alta Aquino MD     • insulin glargine  40 Units Subcutaneous HS Alta Aquino MD     • insulin lispro  1-6 Units Subcutaneous TID [facility] Alta Aquino MD     • insulin lispro  1-6 Units Subcutaneous HS Alta Aquino MD     • insulin lispro  10 Units Subcutaneous Daily With Breakfast Alta Aquino MD     • insulin lispro  10 Units Subcutaneous Daily With Lunch Alta Aquino MD     • insulin lispro  10 Units Subcutaneous Daily With Khushi Hood MD     • ipratropium  0 5 mg Nebulization Q6H Alta Aquino MD     • isosorbide mononitrate  30 mg Oral Daily Alta Aquino MD     • levalbuterol  1 25 mg Nebulization Q6H Alta Aquino MD     • melatonin  3 mg Oral HS [place], PA-C     • ondansetron  4 mg Intravenous Q6H PRN Alta Aquino MD     • pantoprazole  40 mg Oral BID AC Alta Aquino MD     • pravastatin  80 mg Oral Daily With Khushi Hood MD     • senna-docusate sodium  1 tablet Oral HS Alta Aquino MD     • sodium chloride  1 spray Each Nare Q1H PRN Anne Marie Ghosh PA-C     • tamsulosin  0 4 mg Oral Daily With Khushi Hood MD                        ** Please Note: This note is constructed using a voice recognition dictation system  An occasional wrong word/phrase or “sound-a-like” substitution may have been picked up by dictation device due to the inherent limitations of voice recognition software  Read the chart carefully and recognize, using reasonable context, where substitutions may have occurred  **

## 2022-11-24 NOTE — RESPIRATORY THERAPY NOTE
RT Ventilator Management Note  Adelene Burkitt Sr  78 y o  male MRN: [id number]  Unit/Bed#: [facility] 904-01 Encounter: [id number]       11/24/22 1115   Respiratory Assessment   Resp Comments Asked to return to pt's room to reinitiate BiPAP stat  Task compledted within 2 mins of call  BiPAP settings of 16 /  8 and 35% on V 60 that pt apparently refused to wear last night  PaCO2 now 83 with pH of 7 25  Dr Jasmyn Cortez tigertexted and able to report to pt's room momentarily  Pt did respond when spoken to in a loud and firm voice telling me he could not clear the gurgling secretions heard in the back of his throat  Anticipating pt to remain on V60 continuously for now  Pt not fighting the BiPAP mask at all  Met with Dr Jasmyn Cortez in pt's room  CXR ordered  Repeat ABG in an hour ordered  RT aware  RNs aware  O2 Device V60 BiPAP   Non-Invasive Information   O2 Interface Device Face mask   Non-Invasive Ventilation Mode BiPAP   $ Continous NIV Subsequent   $ Pulse Oximetry Spot Check Charge Completed   Non-Invasive Settings   FiO2 (%) 35   Flow (lpm) 0   Temperature (Set) 21   Rise Time 3   Humidification 21   IPAP (cm) 16 cm   EPAP (cm) 8 cm   Rate (Set) 24   Inspiratory Time (Set) 0 9   Non-Invasive Readings   Skin Intervention Skin intact   Heater Temperature (Obs) 21   Total Rate 25   Spontaneous Vt (mL) 462   Spontaneous MV (mL) 11 4   Leak (lpm) 38   Vt (mL) (Mech) 462   MV (Mech) 11 4   Peak Pressure (Obs) 15       Daily Screen    No data found in the last 10 encounters  Physical Exam:   Assessment Type: During-treatment  General Appearance: Drowsy, Sleeping  Respiratory Pattern: Dyspnea with exertion  Chest Assessment: Chest expansion symmetrical  Bilateral Breath Sounds: Diminished  Cough: None  O2 Device: V60 BiPAP      Resp Comments: Asked to return to pt's room to reinitiate BiPAP stat  Task compledted within 2 mins of call    BiPAP settings of 16 /  8 and 35% on V 60 that pt apparently refused to wear last night  PaCO2 now 83 with pH of 7 25  Dr Grecia Cronin tigertexted and able to report to pt's room momentarily  Pt did respond when spoken to in a loud and firm voice telling me he could not clear the gurgling secretions heard in the back of his throat  Anticipating pt to remain on V60 continuously for now  Pt not fighting the BiPAP mask at all  Met with Dr Grecia Cronin in pt's room  CXR ordered  Repeat ABG in an hour ordered  RT aware  RNs aware

## 2022-11-24 NOTE — PROGRESS NOTES
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 as well as ATN  3  Acute on chronic diastolic congestive heart failure, Volume overload, continue with Bumex 1 mg intravenously b i d , monitor ins and outs, follow daily weights, call for a net negative of 1-2 L daily    4  Hemodynamics, blood pressure stable, avoid relative hypotension    5  Anemia, suspected component of chronic kidney disease, hemoglobin low but stable in the high sevens, monitor H&H and transfusion for hemoglobin less than 7    6  Sepsis, pneumonia, antibiotics per primary team     7  Encephalopathy, per primary team    SUBJECTIVE:  Patient seen and examined, somnolent, minimally responsive, ROS unable to be obtained  Blood pressure stable, urine output documented 1 3 L over the last 24 hours with a net -1 8    Status post thoracentesis yesterday with 900 cc of fluid removed    OBJECTIVE:  Current Weight: Weight - Scale: 109 kg (240 lb 1 3 oz)  Vitals:    11/24/22 0747   BP: 136/62   Pulse: 90   Resp: 16   Temp: 97 9 °F (36 6 °C)   SpO2: 99%       Intake/Output Summary (Last 24 hours) at 11/24/2022 1228  Last data filed at 11/24/2022 1101  Gross per 24 hour   Intake 230 ml   Output 2700 ml   Net -2470 ml     General:  Somnolent, minimally responsive, in not acute distress  Eyes:  Eyes are closed  ENT: lips and mucous membranes moist  Neck: supple, no JVD  Chest:  Decreased breath sounds at bases, no crackles, ronchus or wheezings  CVS: distinct S1 & S2, normal rate, regular rhythm  Abdomen:  Obese, non-tender, non-distended, normoactive bowel sounds  Extremities:  2+ edema of both legs  Skin: no rash  Neuro:  Somnolent, minimally responsive      Medications:    Current Facility-Administered Medications:   •  apixaban (ELIQUIS) tablet 5 mg, 5 mg, Oral, BID, Catarina Bruce MD, 5 mg at 11/23/22 1819  •  aspirin (ECOTRIN LOW STRENGTH) EC tablet 81 mg, 81 mg, Oral, Daily, Catarina Bruce MD, 81 mg at 11/23/22 5475  •  budesonide (PULMICORT) inhalation solution 0 5 mg, 0 5 mg, Nebulization, Q12H, Catarina Bruce MD, 0 5 mg at 11/24/22 0743  •  bumetanide (BUMEX) injection 1 mg, 1 mg, Intravenous, BID, Amarjit Khoury MD, 1 mg at 11/24/22 4520  •  carvedilol (COREG) tablet 6 25 mg, 6 25 mg, Oral, BID With Meals, Catarina Bruce MD, 6 25 mg at 11/23/22 1819  •  cefepime (MAXIPIME) 1,000 mg in dextrose 5 % 50 mL IVPB, 1,000 mg, Intravenous, Q12H, Tanvir Loyd MD, Last Rate: 100 mL/hr at 11/24/22 1132, 1,000 mg at 11/24/22 1132  •  fluticasone (FLOVENT HFA) 110 MCG/ACT inhaler 1 puff, 1 puff, Inhalation, Q12H [facility], Catarina Bruce MD, 1 puff at 11/24/22 6516  •  formoterol (PERFOROMIST) nebulizer solution 20 mcg, 20 mcg, Nebulization, Q12H, Catarina Bruce MD, 20 mcg at 11/24/22 4184  •  gabapentin (NEURONTIN) capsule 100 mg, 100 mg, Oral, TID, Catarina Bruce MD, 100 mg at 11/23/22 2220  •  insulin glargine (LANTUS) subcutaneous injection 40 Units 0 4 mL, 40 Units, Subcutaneous, HS, Catarina Bruce MD, 40 Units at 11/23/22 2227  •  insulin lispro (HumaLOG) 100 units/mL subcutaneous injection 1-6 Units, 1-6 Units, Subcutaneous, TID AC, 2 Units at 11/24/22 0924 **AND** Fingerstick Glucose (POCT), , , 4x Daily AC and at bedtime, Catarina Bruce MD  •  insulin lispro (HumaLOG) 100 units/mL subcutaneous injection 1-6 Units, 1-6 Units, Subcutaneous, HS, Catarina Bruce MD, 3 Units at 11/23/22 6787  •  insulin lispro (HumaLOG) 100 units/mL subcutaneous injection 10 Units, 10 Units, Subcutaneous, Daily With Breakfast, Ailyn Chaidez MD, 10 Units at 11/24/22 0924  •  insulin lispro (HumaLOG) 100 units/mL subcutaneous injection 10 Units, 10 Units, Subcutaneous, Daily With Brianna Escobar MD, 10 Units at 11/24/22 1131  •  insulin lispro (HumaLOG) 100 units/mL subcutaneous injection 10 Units, 10 Units, Subcutaneous, Daily With Kirsten Schaffer MD, 10 Units at 11/23/22 1818  •  ipratropium (ATROVENT) 0 02 % inhalation solution 0 5 mg, 0 5 mg, Nebulization, Q6H, Ailyn Chaidez MD, 0 5 mg at 11/24/22 2898  •  isosorbide mononitrate (IMDUR) 24 hr tablet 30 mg, 30 mg, Oral, Daily, Ailyn Chaidez MD, 30 mg at 11/23/22 4653  •  levalbuterol (Zulema Mayur) inhalation solution 1 25 mg, 1 25 mg, Nebulization, Q6H, Ailyn Chaidez MD, 1 25 mg at 11/24/22 7917  •  melatonin tablet 3 mg, 3 mg, Oral, HS, Ailin Faulkner PA-C, 3 mg at 11/23/22 2220  •  ondansetron (ZOFRAN) injection 4 mg, 4 mg, Intravenous, Q6H PRN, Ailyn Chaidez MD  •  pantoprazole (PROTONIX) EC tablet 40 mg, 40 mg, Oral, BID AC, Ailyn Chaidez MD, 40 mg at 11/23/22 1819  •  pravastatin (PRAVACHOL) tablet 80 mg, 80 mg, Oral, Daily With Kirsten Schaffer MD, 80 mg at 11/23/22 1819  •  senna-docusate sodium (SENOKOT S) 8 6-50 mg per tablet 1 tablet, 1 tablet, Oral, HS, Ailyn Chaidez MD, 1 tablet at 11/23/22 2220  •  sodium chloride (OCEAN) 0 65 % nasal spray 1 spray, 1 spray, Each Nare, Q1H PRN, Ailin Faulkner PA-C  •  tamsulosin (FLOMAX) capsule 0 4 mg, 0 4 mg, Oral, Daily With Dinner, Ailyn Chaidez MD, 0 4 mg at 11/23/22 1819    Invasive Devices:   Urethral Catheter Straight-tip 18 Fr   (Active)   Amt returned on insertion(mL) 300 mL 11/20/22 1309   Reasons to continue Urinary Catheter  Acute urinary retention/obstruction failing urinary retention protocol 11/24/22 0901   Goal for Removal Voiding trial when ambulation improves 11/24/22 0901   Site Assessment Clean;Skin intact 11/24/22 0901 Cook Care Done 11/24/22 0900   Collection Container Standard drainage bag 11/24/22 0901   Output (mL) 700 mL 11/24/22 1101       Lab Results:   Results from last 7 days   Lab Units 11/24/22  1054 11/24/22  0607 11/23/22  0451 11/22/22  0521 11/21/22  0636 11/20/22  2241 11/20/22  1102   WBC Thousand/uL  --  8 00 10 58* 12 99* 17 90*  --  17 88*   HEMOGLOBIN g/dL  --  7 7* 7 8* 7 1* 7 5*  --  8 7*   HEMATOCRIT %  --  27 9* 26 5* 24 8* 26 0*  --  29 6*   PLATELETS Thousands/uL  --  103* 143* 132* 153  --  183   SODIUM mmol/L  --  134* 137 137 138  --  137   POTASSIUM mmol/L 4 7 5 5* 4 5 4 2 4 0  --  4 1   CHLORIDE mmol/L  --  105 101 102 101  --  102   CO2 mmol/L  --  21 30 31 33*  --  29   BUN mg/dL  --  61* 60* 46* 38*  --  31*   CREATININE mg/dL  --  3 24* 3 26* 2 72* 2 32*  --  2 01*   CALCIUM mg/dL  --  8 3 8 4 8 3 8 3  --  8 4   MAGNESIUM mg/dL  --  3 0*  --  2 5 2 3  --   --    PHOSPHORUS mg/dL  --  5 0*  --  3 8 4 3*   < >  --    ALK PHOS U/L  --   --   --   --  69  --  84   ALT U/L  --   --   --   --  10*  --  12   AST U/L  --   --   --   --  9  --  7    < > = values in this interval not displayed  Previous work up:  See previous notes      Portions of the record may have been created with voice recognition software  Occasional wrong word or "sound a like" substitutions may have occurred due to the inherent limitations of voice recognition software  Read the chart carefully and recognize, using context, where substitutions have occurred  If you have any questions, please contact the dictating provider

## 2022-11-25 ENCOUNTER — APPOINTMENT (INPATIENT)
Dept: RADIOLOGY | Facility: HOSPITAL | Age: 77
End: 2022-11-25

## 2022-11-25 LAB
ALBUMIN SERPL BCP-MCNC: 2.1 G/DL (ref 3.5–5)
ALP SERPL-CCNC: 59 U/L (ref 46–116)
ALT SERPL W P-5'-P-CCNC: 7 U/L (ref 12–78)
ANION GAP SERPL CALCULATED.3IONS-SCNC: 0 MMOL/L (ref 4–13)
APTT PPP: 33 SECONDS (ref 23–37)
APTT PPP: 40 SECONDS (ref 23–37)
ARTERIAL PATENCY WRIST A: YES
AST SERPL W P-5'-P-CCNC: 6 U/L (ref 5–45)
ATRIAL RATE: 88 BPM
BASE EXCESS BLDA CALC-SCNC: 8.5 MMOL/L
BASOPHILS # BLD AUTO: 0.03 THOUSANDS/ÂΜL (ref 0–0.1)
BASOPHILS NFR BLD AUTO: 1 % (ref 0–1)
BILIRUB SERPL-MCNC: 0.34 MG/DL (ref 0.2–1)
BUN SERPL-MCNC: 59 MG/DL (ref 5–25)
CALCIUM ALBUM COR SERPL-MCNC: 10 MG/DL (ref 8.3–10.1)
CALCIUM SERPL-MCNC: 8.5 MG/DL (ref 8.3–10.1)
CHLORIDE SERPL-SCNC: 105 MMOL/L (ref 96–108)
CO2 SERPL-SCNC: 35 MMOL/L (ref 21–32)
CREAT SERPL-MCNC: 2.84 MG/DL (ref 0.6–1.3)
EOSINOPHIL # BLD AUTO: 0.08 THOUSAND/ÂΜL (ref 0–0.61)
EOSINOPHIL NFR BLD AUTO: 1 % (ref 0–6)
ERYTHROCYTE [DISTWIDTH] IN BLOOD BY AUTOMATED COUNT: 14.8 % (ref 11.6–15.1)
ERYTHROCYTE [DISTWIDTH] IN BLOOD BY AUTOMATED COUNT: 15.1 % (ref 11.6–15.1)
GFR SERPL CREATININE-BSD FRML MDRD: 20 ML/MIN/1.73SQ M
GLUCOSE SERPL-MCNC: 123 MG/DL (ref 65–140)
GLUCOSE SERPL-MCNC: 131 MG/DL (ref 65–140)
GLUCOSE SERPL-MCNC: 137 MG/DL (ref 65–140)
GLUCOSE SERPL-MCNC: 143 MG/DL (ref 65–140)
GLUCOSE SERPL-MCNC: 94 MG/DL (ref 65–140)
GLUCOSE SERPL-MCNC: 97 MG/DL (ref 65–140)
HCO3 BLDA-SCNC: 35 MMOL/L (ref 22–28)
HCT VFR BLD AUTO: 21.6 % (ref 36.5–49.3)
HCT VFR BLD AUTO: 23.7 % (ref 36.5–49.3)
HGB BLD-MCNC: 6.7 G/DL (ref 12–17)
HGB BLD-MCNC: 7.1 G/DL (ref 12–17)
IMM GRANULOCYTES # BLD AUTO: 0.04 THOUSAND/UL (ref 0–0.2)
IMM GRANULOCYTES NFR BLD AUTO: 1 % (ref 0–2)
INR PPP: 1.19 (ref 0.84–1.19)
IPAP: 24
LYMPHOCYTES # BLD AUTO: 0.52 THOUSANDS/ÂΜL (ref 0.6–4.47)
LYMPHOCYTES NFR BLD AUTO: 8 % (ref 14–44)
MAGNESIUM SERPL-MCNC: 2.5 MG/DL (ref 1.6–2.6)
MCH RBC QN AUTO: 25.7 PG (ref 26.8–34.3)
MCH RBC QN AUTO: 26 PG (ref 26.8–34.3)
MCHC RBC AUTO-ENTMCNC: 30 G/DL (ref 31.4–37.4)
MCHC RBC AUTO-ENTMCNC: 30.6 G/DL (ref 31.4–37.4)
MCV RBC AUTO: 84 FL (ref 82–98)
MCV RBC AUTO: 87 FL (ref 82–98)
MONOCYTES # BLD AUTO: 0.53 THOUSAND/ÂΜL (ref 0.17–1.22)
MONOCYTES NFR BLD AUTO: 8 % (ref 4–12)
NEUTROPHILS # BLD AUTO: 5.08 THOUSANDS/ÂΜL (ref 1.85–7.62)
NEUTS SEG NFR BLD AUTO: 81 % (ref 43–75)
NON VENT- BIPAP: ABNORMAL
NRBC BLD AUTO-RTO: 0 /100 WBCS
O2 CT BLDA-SCNC: 11.3 ML/DL (ref 16–23)
OXYHGB MFR BLDA: 96.9 % (ref 94–97)
PCO2 BLDA: 62 MM HG (ref 36–44)
PEEP MAX SETTING VENT: 8 CM[H2O]
PH BLDA: 7.37 [PH] (ref 7.35–7.45)
PHOSPHATE SERPL-MCNC: 3.9 MG/DL (ref 2.3–4.1)
PLATELET # BLD AUTO: 135 THOUSANDS/UL (ref 149–390)
PLATELET # BLD AUTO: 135 THOUSANDS/UL (ref 149–390)
PMV BLD AUTO: 10.4 FL (ref 8.9–12.7)
PMV BLD AUTO: 10.6 FL (ref 8.9–12.7)
PO2 BLDA: 124.1 MM HG (ref 75–129)
POTASSIUM SERPL-SCNC: 4.6 MMOL/L (ref 3.5–5.3)
PR INTERVAL: 0 MS
PROCALCITONIN SERPL-MCNC: 0.64 NG/ML
PROT SERPL-MCNC: 5.6 G/DL (ref 6.4–8.4)
PROTHROMBIN TIME: 15.4 SECONDS (ref 11.6–14.5)
QRS AXIS: 124 DEGREES
QRSD INTERVAL: 142 MS
QT INTERVAL: 383 MS
QTC INTERVAL: 464 MS
RBC # BLD AUTO: 2.57 MILLION/UL (ref 3.88–5.62)
RBC # BLD AUTO: 2.73 MILLION/UL (ref 3.88–5.62)
SODIUM SERPL-SCNC: 140 MMOL/L (ref 135–147)
SPECIMEN SOURCE: ABNORMAL
T WAVE AXIS: -59 DEGREES
VENT BIPAP FIO2: 35 %
VENTRICULAR RATE: 88 BPM
WBC # BLD AUTO: 6.28 THOUSAND/UL (ref 4.31–10.16)
WBC # BLD AUTO: 6.55 THOUSAND/UL (ref 4.31–10.16)

## 2022-11-25 RX ORDER — INSULIN LISPRO 100 [IU]/ML
1-6 INJECTION, SOLUTION INTRAVENOUS; SUBCUTANEOUS EVERY 6 HOURS
Status: DISCONTINUED | OUTPATIENT
Start: 2022-11-25 | End: 2022-11-27

## 2022-11-25 RX ORDER — HEPARIN SODIUM 10000 [USP'U]/100ML
3-20 INJECTION, SOLUTION INTRAVENOUS
Status: DISCONTINUED | OUTPATIENT
Start: 2022-11-25 | End: 2022-11-25

## 2022-11-25 RX ORDER — HEPARIN SODIUM 1000 [USP'U]/ML
4000 INJECTION, SOLUTION INTRAVENOUS; SUBCUTANEOUS
Status: DISCONTINUED | OUTPATIENT
Start: 2022-11-25 | End: 2022-11-25

## 2022-11-25 RX ORDER — HEPARIN SODIUM 1000 [USP'U]/ML
2000 INJECTION, SOLUTION INTRAVENOUS; SUBCUTANEOUS
Status: DISCONTINUED | OUTPATIENT
Start: 2022-11-25 | End: 2022-11-25

## 2022-11-25 RX ORDER — HEPARIN SODIUM 1000 [USP'U]/ML
4000 INJECTION, SOLUTION INTRAVENOUS; SUBCUTANEOUS ONCE
Status: COMPLETED | OUTPATIENT
Start: 2022-11-25 | End: 2022-11-25

## 2022-11-25 RX ADMIN — CARVEDILOL 6.25 MG: 6.25 TABLET, FILM COATED ORAL at 15:52

## 2022-11-25 RX ADMIN — BUDESONIDE 0.5 MG: 0.5 INHALANT ORAL at 20:17

## 2022-11-25 RX ADMIN — SENNOSIDES AND DOCUSATE SODIUM 1 TABLET: 8.6; 5 TABLET ORAL at 21:27

## 2022-11-25 RX ADMIN — HEPARIN SODIUM 11.1 UNITS/KG/HR: 10000 INJECTION, SOLUTION INTRAVENOUS at 08:11

## 2022-11-25 RX ADMIN — HEPARIN SODIUM 4000 UNITS: 1000 INJECTION INTRAVENOUS; SUBCUTANEOUS at 08:10

## 2022-11-25 RX ADMIN — GABAPENTIN 100 MG: 100 CAPSULE ORAL at 15:52

## 2022-11-25 RX ADMIN — INSULIN GLARGINE 40 UNITS: 100 INJECTION, SOLUTION SUBCUTANEOUS at 01:40

## 2022-11-25 RX ADMIN — IPRATROPIUM BROMIDE 0.5 MG: 0.5 SOLUTION RESPIRATORY (INHALATION) at 13:41

## 2022-11-25 RX ADMIN — LEVALBUTEROL HYDROCHLORIDE 1.25 MG: 1.25 SOLUTION, CONCENTRATE RESPIRATORY (INHALATION) at 07:15

## 2022-11-25 RX ADMIN — BUMETANIDE 1 MG: 0.25 INJECTION, SOLUTION INTRAMUSCULAR; INTRAVENOUS at 09:22

## 2022-11-25 RX ADMIN — IPRATROPIUM BROMIDE 0.5 MG: 0.5 SOLUTION RESPIRATORY (INHALATION) at 20:17

## 2022-11-25 RX ADMIN — FLUTICASONE PROPIONATE 1 PUFF: 110 AEROSOL, METERED RESPIRATORY (INHALATION) at 21:28

## 2022-11-25 RX ADMIN — LEVALBUTEROL HYDROCHLORIDE 1.25 MG: 1.25 SOLUTION, CONCENTRATE RESPIRATORY (INHALATION) at 13:41

## 2022-11-25 RX ADMIN — PRAVASTATIN SODIUM 80 MG: 80 TABLET ORAL at 15:52

## 2022-11-25 RX ADMIN — CEFEPIME 1000 MG: 1 INJECTION, POWDER, FOR SOLUTION INTRAMUSCULAR; INTRAVENOUS at 02:11

## 2022-11-25 RX ADMIN — IPRATROPIUM BROMIDE 0.5 MG: 0.5 SOLUTION RESPIRATORY (INHALATION) at 07:15

## 2022-11-25 RX ADMIN — IPRATROPIUM BROMIDE 0.5 MG: 0.5 SOLUTION RESPIRATORY (INHALATION) at 00:37

## 2022-11-25 RX ADMIN — GABAPENTIN 100 MG: 100 CAPSULE ORAL at 21:27

## 2022-11-25 RX ADMIN — LEVALBUTEROL HYDROCHLORIDE 1.25 MG: 1.25 SOLUTION, CONCENTRATE RESPIRATORY (INHALATION) at 00:37

## 2022-11-25 RX ADMIN — LEVALBUTEROL HYDROCHLORIDE 1.25 MG: 1.25 SOLUTION, CONCENTRATE RESPIRATORY (INHALATION) at 20:17

## 2022-11-25 RX ADMIN — TAMSULOSIN HYDROCHLORIDE 0.4 MG: 0.4 CAPSULE ORAL at 15:52

## 2022-11-25 RX ADMIN — INSULIN GLARGINE 40 UNITS: 100 INJECTION, SOLUTION SUBCUTANEOUS at 21:27

## 2022-11-25 RX ADMIN — BUMETANIDE 1 MG: 0.25 INJECTION, SOLUTION INTRAMUSCULAR; INTRAVENOUS at 01:40

## 2022-11-25 RX ADMIN — APIXABAN 5 MG: 5 TABLET, FILM COATED ORAL at 15:52

## 2022-11-25 RX ADMIN — BUDESONIDE 0.5 MG: 0.5 INHALANT ORAL at 07:15

## 2022-11-25 RX ADMIN — BUMETANIDE 1 MG: 0.25 INJECTION, SOLUTION INTRAMUSCULAR; INTRAVENOUS at 17:29

## 2022-11-25 RX ADMIN — CEFTAZIDIME 2000 MG: 1 INJECTION, POWDER, FOR SOLUTION INTRAMUSCULAR; INTRAVENOUS at 14:10

## 2022-11-25 RX ADMIN — PANTOPRAZOLE SODIUM 40 MG: 40 TABLET, DELAYED RELEASE ORAL at 15:52

## 2022-11-25 RX ADMIN — INSULIN LISPRO 1 UNITS: 100 INJECTION, SOLUTION INTRAVENOUS; SUBCUTANEOUS at 01:40

## 2022-11-25 NOTE — SPEECH THERAPY NOTE
Speech-Language Pathology Bedside Swallow Evaluation    Patient Name: Niles Lobo  Today's Date: 11/25/2022     Problem List  Principal Problem:    Sepsis ([facility] )  Active Problems:    COPD (chronic obstructive pulmonary disease) (HCC)    HLD (hyperlipidemia)    Type 2 diabetes mellitus with hyperglycemia, with long-term current use of insulin (HCC)    Pleural effusion on right    Essential hypertension    CAD (coronary artery disease)    Acute on chronic respiratory failure with hypoxia and hypercapnia (HCC)    Adenocarcinoma of lung (HCC)    Stage 3b chronic kidney disease (CKD) ([facility] )    A-fib (HCC)    Anemia    Pneumonia    Acute on chronic diastolic heart failure ([facility] )    Past Medical History  Past Medical History:   Diagnosis Date   • Abdominal pain    • MARANDA ([facility]) ([facility] ) 6/19/2018   • Cardiac disease    • CHF (congestive heart failure) ([facility])    • COPD, severe ([facility] )    • Coronary artery disease    • DDD (degenerative disc disease), lumbar 9/1/2020   • Diabetes mellitus (HCC)    • Dyspnea    • GERD (gastroesophageal reflux disease)    • Hyperlipidemia    • Hypertension    • MI (myocardial infarction) ([facility] )     with 3 stents   • Nodule of apex of right lung    • JACQUELINE (obstructive sleep apnea)    • Prostate cancer [facility])      Past Surgical History  Past Surgical History:   Procedure Laterality Date   • ABDOMINAL SURGERY      exploratory   • ANGIOPLASTY      3 stents   • APPENDECTOMY     • COLONOSCOPY  2015   • CT NEEDLE BIOPSY LUNG  11/3/2020   • ESOPHAGOGASTRODUODENOSCOPY N/A 10/2/2017    Procedure: ESOPHAGOGASTRODUODENOSCOPY (EGD); Surgeon: Usman Whitt MD;  Location: [facility];   Service: Gastroenterology   • IR THORACENTESIS  11/3/2020   • IR THORACENTESIS  11/8/2022   • IR THORACENTESIS  11/23/2022   • KNEE CARTILAGE SURGERY     • OTHER SURGICAL HISTORY      stent placement   • PROSTATE SURGERY     • SKIN GRAFT      Basal cell CA back       Summary  Pt presented with s/s suggestive of mild-moderate oral and suspected mild pharyngeal dysphagia  Symptoms or concerns included decreased mastication and decreased bolus formation suspected pharyngeal swallow delay and suspected decreased hyolaryngeal elevation upon palpation  Dysphagia appears to be exacerbated by AMS currently  Recommend [facility] soft diet for now, with ST f/u for potential to advance  Risk/s for Aspiration: mild    Recommended Diet: mechanically altered/level 2 diet and thin liquids   Recommended Form of Meds: whole with puree   Aspiration precautions and swallowing strategies: upright posture, only feed when fully alert, slow rate of feeding, small bites/sips and alternating bites and sips  Other Recommendations: Continue frequent oral care      Current Medical Status per H&P 11/20/22  Herman Simmonds Sr  is a 78 y o  male with a PMH  of COPD on 2 L nasal cannula at baseline, chronic diastolic heart failure, iron deficiency anemia, CKD stage IIIB-creatinine baseline around 2, AFib on Eliquis, type 2 diabetes on insulin, adeno carcinoma status post SBRT, presented to the ER for evaluation of shortness of breath  Patient reports acute onset shortness of breath yesterday worsening over the past 24 hours  Patient denies any fever chills chest pain nausea vomiting abdominal pain  He did report he has noticed worsening leg swelling  He also reports worsening cough from baseline with productive sputum  On initial evaluation in ER patient was noted to be in significant respiratory distress with increased work of breathing ,  trial of BiPAP was recommended  However patient refused  He was placed on high-flow nasal cannula at 50 L and gradually wean down to 6 L nasal cannula on my evaluation  Patient with comfortable, did not display any conversational dyspnea or significant increased work of breathing  Patient appears volume overloaded on exam with lower extremity swelling, crackles at bases   Patient will be admitted for management of acute chronic heart failure as well as possible pneumonia  Of note, per EMS there is concern that the patient is hoarding  Appears patient is being evicted secondary to hoarding  Will need case management for long-term placement  Special Studies:  CXR 11/25/22 IMPRESSION:  Unchanged moderate right pleural effusion, and right hemithorax volume loss with right basilar atelectasis  CT head 11/24/22 IMPRESSION:  No acute intracranial abnormality  Chronic microangiopathic changes  Swallow Information   Current Risks for Dysphagia & Aspiration: AMS and decreased alertness  Current Symptoms/Concerns: change in respiratory status  Current Diet: NPO   Baseline Diet: regular diet and thin liquids      Baseline Assessment   Behavior/Cognition: waxing and waning arousal level  Speech/Language Status: able to follow commands inconsistently and limited verbal output  Patient Positioning: upright in bed  Pain Status/Interventions/Response to Interventions: No report of or nonverbal indications of pain  Swallow Mechanism Exam  Facial: symmetrical  Labial: decreased strength  Lingual: decreased coordination  Velum: unable to visualize  Mandible:  decreased ROM  Dentition: limited dentition  Vocal quality:weak   Volitional Cough: weak   Respiratory Status: on 4L O2       Consistencies Assessed and Performance   Consistencies Administered: thin liquids, puree and hard solids    Oral Stage: mild-moderate  Mastication was reduced with the materials administered today  Bolus formation and transfer were also reduced with mild oral residue noted  Pharyngeal Stage: mild  Swallow Mechanics: Swallowing initiation appeared mildly delayed  Laryngeal rise was palpated and judged to be mildly reduced  No coughing, throat clearing, change in vocal quality or respiratory status noted today       Esophageal Concerns: none reported      Summary and Recommendations (see above)    Results Reviewed with: patient, RN and MD Treatment Recommended: yes     Frequency of treatment: 2-3x/week    Dysphagia LTG  -Patient will demonstrate safe and effective oral intake (without overt s/s significant oral/pharyngeal dysphagia including s/s penetration or aspiration) for the highest appropriate diet level  Short Term Goals:  -Pt will tolerate Dysphagia 2/mechanical soft diet and thin liquid with no significant s/s oral or pharyngeal dysphagia across 1-3 diagnostic sessions     -Patient will tolerate trials of upgraded food texture with no significant s/s of oral or pharyngeal dysphagia including aspiration across 1-3 diagnostic sessions

## 2022-11-25 NOTE — PHYSICAL THERAPY NOTE
Physical Therapy Cancellation Note    PT orders received chart review completed  Pt is currently on Bipap and not appropriate to participate in skilled PT at this time  PT will follow and eval as medically appropriate       11/25/22 1200   Note Type   Note type Cancelled Session   Cancel Reasons Medical status     Jayson Link, PT

## 2022-11-25 NOTE — PROGRESS NOTES
At change of shift, this patient was found by myself to be lethargic and only responsive to painful stimuli; according to nightshift nurse the patient was only 'sleepy' this morning  Provider to bedside immediately as well as RT, to which this nurse was instructed there was no need for a rapid response  Labs including ABG were drawn  Bipap was applied and continued for the rest of the shift  Patient responding to painful stimuli at this time, and LOC updated to level 1 step down due to continuous bipap  We are awaiting bed placement in the Medical Intensive Care Unit

## 2022-11-25 NOTE — OCCUPATIONAL THERAPY NOTE
OT CANCEL NOTE    Pt chart reviewed  Pt transferred to MICU 2/2 poor respiratory status; on bipap and per RN not medically appropriate at this time  Will continue to follow pt on caseload and see pt when medically stable and as clinically appropriate         11/25/22 1123   OT Last Visit   OT Visit Date 11/25/22   Note Type   Note Type Cancelled Session   Cancel Reasons Medical status       Francy Vale MS, OTR/L

## 2022-11-25 NOTE — PROGRESS NOTES
Daily Progress Note - Critical Care   Barb Costa Sr  78 y o  male MRN: [id number]  Unit/Bed#: MICU 13 Encounter: [id number]        ----------------------------------------------------------------------------------------  HPI/24hr events:  Rekha is a 28-year-old male with a past medical history of severe COPD, diastolic CHF, atrial fibrillation on Eliquis, lung cancer status post SBRT, type 2 diabetes, stage 3b CKD, GERD, CAD and hypertension who presented to the emergency department 11/20 complaining of worsening shortness of breath   His worsening hypoxic respiratory failure was thought to be secondary to acute CHF exacerbation versus COPD exacerbation versus possible underlying pneumonia   He was placed on high-flow nasal cannula in the emergency department as well as a nitro drip for elevated blood pressure and Lasix   The patient met SIRS criteria and was started on antibiotics and blood cultures were drawn  hCyna Sanders was also found to have a new near complete occlusion of the middle lobe on the right lower lobe bronchial branches with atelectasis on CT chest   He remained on high-flow nasal cannula and so he was brought to the ICCU for further monitoring  The ICCU the patient was placed on BiPAP due to worsening hypoxic and hypercapnic respiratory failure   Blood and urine cultures were positive for Pseudomonas and he was continued on cefepime and vancomycin was discontinued   The patient's clinical status slowly improved and he was eventually weaned off of BiPAP and down to 3 L nasal cannula   He was later deemed medically stable for downgrade to med surge      On 11/24 critical care team was alerted to assess patient  Per review of notes from day patient was instructed to be compliant with BiPAP on previous night  Per nursing, patient was not compliant overnight he was found to be increasingly lethargic and the morning of 11/24  Initial ABG showed the patient was in respiratory acidosis with a pH of 7 2-9  Patient had elevated pCO2 of 83 5  Respiratory therapy was contacted and patient was subsequently placed back on BiPAP  Follow-up ABG few hours after initiating BiPAP revealed a pH improved but still acidotic at 7 3  Patient had slightly improved pCO2 is 73 5 at that time as well  Patient had repeat chest x-ray today which is concerning for a possible new left basilar pneumonia, the patient already on antibiotic therapy  After evaluation of critical care team, patient is appropriate for step-down level 1  Patient was acutely obtunded  Patient responded to sternal rub and at that point was able to follow basic commands such as squeezing hands  Perfect patient had not taken any narcotics today, blood sugars acceptable and without concern for hypoglycemia  No metabolic derangements  Updated level of care, will continue BiPAP for patient overnight  Stat head CT pending as patient with acute change in mental status and on Eliquis  11/25 patient remained on BiPAP overnight     ---------------------------------------------------------------------------------------  SUBJECTIVE  The patient was seen and examined at bedside  He had no acute events overnight  He wore his BiPAP mask overnight without issues  This morning he was resting comfortably with no acute complaints  He appeared persistently lethargic but was responsive to questioning  Review of Systems   Constitutional: Negative for activity change, appetite change, chills, fatigue and fever  HENT: Negative for congestion, ear discharge, ear pain, hearing loss, sinus pain, sore throat, tinnitus and trouble swallowing  Eyes: Negative for pain and visual disturbance  Respiratory: Positive for shortness of breath  Negative for cough, chest tightness and wheezing  Cardiovascular: Negative for chest pain and leg swelling     Gastrointestinal: Negative for abdominal distention, abdominal pain, anal bleeding, blood in stool, constipation, diarrhea, nausea, rectal pain and vomiting  Endocrine: Negative for cold intolerance and heat intolerance  Genitourinary: Negative for difficulty urinating, dysuria and frequency  Musculoskeletal: Negative for arthralgias and myalgias  Skin: Negative for rash  Allergic/Immunologic: Negative for environmental allergies and food allergies  Neurological: Negative for dizziness, light-headedness, numbness and headaches  Hematological: Negative for adenopathy  Psychiatric/Behavioral: Positive for confusion  Negative for agitation, behavioral problems and decreased concentration        ---------------------------------------------------------------------------------------  Assessment and Plan:    Neuro:   • Diagnosis:  Toxic metabolic encephalopathy  ? Likely secondary to acute hypoxic, hypercarbic respiratory failure with possible contraction alkalosis  ? Continue BiPAP mask QHS and PRN  Though patient persistently noncompliant with BiPAP overnight  ? Monitor ABG closely  ? Continue delirium precautions  ? CT head negative  ? Cefepime transitioned to ceftazidime in setting of persistent AMS  ? 11/24 patient obtunded, noncompliant with BiPAP overnight     ? 11/25 patient compliant with BiPAP in slowly improving  ? Downgrade to step-down unit     CV:   • Diagnosis:  AFib  ? Plan:  Currently NSR  ? Continue home Eliquis and carvedilol  ? Continue telemetry monitoring  • Diagnosis:  CAD  ? Plan:  Status post stent placement in 2015  ? Stress test negative in May of this year  ? Continue aspirin, statin, beta-blocker  • Diagnosis:  HTN  ? Plan:  Monitor blood pressure closely  ? Continue carvedilol  ? Continue Bumex  • Diagnosis:  HLD  ? Plan:  Continue statin  • Diagnosis:  Acute on chronic diastolic heart failure  ? Plan:  Continue Bumex  ? Continue fluid restriction and sodium restriction  ? Monitor I's and O's and daily weights        Pulm:  • Diagnosis:  Pneumonia  ?  Plan: CXR showed some concern for pneumonia  ? CT chest earlier in admission showed increase in right pleural effusion with new small left effusion, new near complete occlusion of right middle lobe and right lower lobe bronchial branches with atelectasis and new small perihepatic ascites  ? Follow-up blood culture, MRSA culture and urine culture  - Blood and urine culture positive for Pseudomonas   - Cefepime transition to ceftazidime in setting of persistent AMS  - Continue ceftazidime 2 g Q 24  If creatinine clearance rises above 30 will change dosing to q 12  ? Monitor WBC count and fever curve  • Diagnosis:  Lung adenocarcinoma  ? Plan: s/p resection and radiation therapy in the past  • Diagnosis:  Right-sided pleural effusion  • Plan: s/p IR thoracentesis 11/23  • Diagnosis:  COPD  ? Plan:  History of COPD on 2 L NC at baseline  ? Continue nebulizers with Xopenex and Atrovent  - Added Perforomist and budesonide b i d   ? Continue to wean supplemental oxygen as able  • Diagnosis:  Acute on chronic respiratory failure with hypoxia and hypercapnia  ? Plan:  Likely secondary to pneumonia versus CHF exacerbation versus COPD exacerbation  ? Continue plan for pneumonia as above  ? Monitor ABG  ? Continue BIPAP as able, though patient persistently noncompliant overnight  ? Continue Bumex  ? Monitor I's and O's and daily weights  ? Wean supplemental oxygen as able  ? Monitor respiratory status closely        GI:   • Diagnosis:  No active issues        :   • Diagnosis:  Stage IIIB CKD  ? Plan:  Baseline creatinine appears to be from 1 8-2  ? On admission creatinine was at baseline at 2 01  ? Monitor BMP closely  ? Avoid nephrotoxic medications  ? Nephrology following        F/E/N:   • Plan:  Monitor daily BMP        Heme/Onc:   • Diagnosis:  Lung adenocarcinoma  ? Plan: s/p resection and radiation therapy in the past        Endo:   • Diagnosis:  Type 2 diabetes mellitus  ?  Plan:  Home insulin regimen includes 30 units of long-acting at bedtime and 10 units short-acting with meals  ? Continue basal/prandial insulin with additional SSI coverage  ? Gabapentin for neuropathy  ? Monitor blood sugars closely        ID:   • Diagnosis:  Sepsis  ? Plan:  Patient presented with leukocytosis and tachypnea  ? Sources likely secondary to UTI vs pneumonia  - Blood and urine cultures growing Pseudomonas  ? Cefepime transitioned to ceftazidime given persistent AMS  ? Monitor WBC count and fever curve  ? Case management following regarding possible need for long-term placement -> patient reportedly faces of action due to hoarding behaviors         MSK/Skin:   ? Diagnosis:  PT/OT consulted  - OOB when able  - Frequent turning and repositioning      Patient appropriate for transfer out of the ICU today?: Yes  Disposition: Transfer to Stepdown Level 1   Code Status: Level 1 - Full Code  ---------------------------------------------------------------------------------------  ICU CORE MEASURES    Prophylaxis   VTE Pharmacologic Prophylaxis: Heparin Drip  VTE Mechanical Prophylaxis: sequential compression device  Stress Ulcer Prophylaxis: Pantoprazole PO      Invasive Devices Review  Invasive Devices     Peripheral Intravenous Line  Duration           Peripheral IV 22 Left Antecubital 1 day    Peripheral IV 22 Distal;Right;Ventral (anterior) Forearm <1 day    Peripheral IV 22 Proximal;Right;Ventral (anterior) Forearm <1 day          Drain  Duration           Urethral Catheter Straight-tip 18 Fr  5 days              Can any invasive devices be discontinued today?  Not applicable  ---------------------------------------------------------------------------------------  OBJECTIVE    Vitals   Vitals:    22 1342 22 1343 22 1415 22 1515   BP:   140/64 151/51   BP Location:       Pulse:   80 82   Resp:   20 21   Temp:       TempSrc:       SpO2: 99% 100% 98% 98%   Weight:       Height:         Temp (24hrs), Av 6 °F (36 4 °C), Min:97 4 °F (36 3 °C), Max:97 9 °F (36 6 °C)  Current: Temperature: 97 9 °F (36 6 °C)      Respiratory:  SpO2: SpO2: 98 %  Nasal Cannula O2 Flow Rate (L/min): 6 L/min    Invasive/non-invasive ventilation settings   Respiratory    Lab Data (Last 4 hours)    None         O2/Vent Data (Last 4 hours)    None                Physical Exam  Constitutional:       Appearance: He is well-developed and well-nourished  He is obese  HENT:      Head: Normocephalic and atraumatic  Nose: Nose normal       Mouth/Throat:      Mouth: Oropharynx is clear and moist    Eyes:      General:         Right eye: No discharge  Left eye: No discharge  Conjunctiva/sclera: Conjunctivae normal       Pupils: Pupils are equal, round, and reactive to light  Neck:      Thyroid: No thyromegaly  Cardiovascular:      Rate and Rhythm: Normal rate  Rhythm irregular  Heart sounds: Normal heart sounds  No murmur heard  No friction rub  No gallop  Pulmonary:      Effort: No respiratory distress  Breath sounds: Normal breath sounds  No stridor  No wheezing or rales  Comments: BiPAP in place  Chest:      Chest wall: No tenderness  Abdominal:      General: Bowel sounds are normal  There is no distension  Palpations: Abdomen is soft  There is no mass  Tenderness: There is no abdominal tenderness  There is no guarding or rebound  Musculoskeletal:         General: No tenderness or deformity  Right lower leg: No edema  Left lower leg: No edema  Lymphadenopathy:      Cervical: No cervical adenopathy  Skin:     General: Skin is warm and dry  Neurological:      Mental Status: He is alert     Psychiatric:         Mood and Affect: Mood and affect normal       Comments: Lethargic             Laboratory and Diagnostics:  Results from last 7 days   Lab Units 11/25/22  0809 11/25/22  0449 11/24/22  0607 11/23/22  0451 11/22/22  0521 11/21/22  0636 11/20/22  1102   WBC Thousand/uL 6 55 6 28 8 00 10 58* 12 99* 17 90* 17 88* HEMOGLOBIN g/dL 6 7* 7 1* 7 7* 7 8* 7 1* 7 5* 8 7*   HEMATOCRIT % 21 6* 23 7* 27 9* 26 5* 24 8* 26 0* 29 6*   PLATELETS Thousands/uL 135* 135* 103* 143* 132* 153 183   NEUTROS PCT %  --  81* 79*  --   --  89* 90*   MONOS PCT %  --  8 11  --   --  7 8     Results from last 7 days   Lab Units 11/25/22 0449 11/24/22 2034 11/24/22  1054 11/24/22 0607 11/23/22  0451 11/22/22  0521 11/21/22  0636 11/20/22  1102   SODIUM mmol/L 140 140  --  134* 137 137 138 137   POTASSIUM mmol/L 4 6 4 8 4 7 5 5* 4 5 4 2 4 0 4 1   CHLORIDE mmol/L 105 103  --  105 101 102 101 102   CO2 mmol/L 35* 34*  --  21 30 31 33* 29   ANION GAP mmol/L 0* 3*  --  8 6 4 4 6   BUN mg/dL 59* 58*  --  61* 60* 46* 38* 31*   CREATININE mg/dL 2 84* 3 04*  --  3 24* 3 26* 2 72* 2 32* 2 01*   CALCIUM mg/dL 8 5 8 7  --  8 3 8 4 8 3 8 3 8 4   GLUCOSE RANDOM mg/dL 143* 140  --  196* 250* 258* 257* 375*   ALT U/L 7*  --   --   --   --   --  10* 12   AST U/L 6  --   --   --   --   --  9 7   ALK PHOS U/L 59  --   --   --   --   --  69 84   ALBUMIN g/dL 2 1*  --   --   --   --   --  2 2* 2 7*   TOTAL BILIRUBIN mg/dL 0 34  --   --   --   --   --  0 42 0 47     Results from last 7 days   Lab Units 11/25/22 0449 11/24/22 2034 11/24/22  0607 11/22/22  0521 11/21/22  0636 11/20/22  2241   MAGNESIUM mg/dL 2 5 2 6 3 0* 2 5 2 3  --    PHOSPHORUS mg/dL 3 9 4 4* 5 0* 3 8 4 3* 4 4*      Results from last 7 days   Lab Units 11/25/22  1414 11/25/22  0809 11/20/22  1235   INR   --  1 19 1 08   PTT seconds 40* 33 26          Results from last 7 days   Lab Units 11/20/22  2243 11/20/22  1235   LACTIC ACID mmol/L 1 5 2 0     ABG:  Results from last 7 days   Lab Units 11/25/22  0039   PH ART  7 369   PCO2 ART mm Hg 62 0*   PO2 ART mm Hg 124 1   HCO3 ART mmol/L 35 0*   BASE EXC ART mmol/L 8 5   ABG SOURCE  Radial, Left     VBG:  Results from last 7 days   Lab Units 11/25/22  0039 11/21/22  1553 11/20/22  2329   PH MAYE   --   --  7 288*   PCO2 MAYE mm Hg  --   --  67 2*   PO2 MAYE mm Hg  --   --  54 0*   HCO3 MAYE mmol/L  --   --  31 4*   BASE EXC MAYE mmol/L  --   --  4 0   ABG SOURCE  Radial, Left   < >  --     < > = values in this interval not displayed  Results from last 7 days   Lab Units 11/25/22  0449 11/23/22  0451 11/21/22  0636 11/20/22  1102   PROCALCITONIN ng/ml 0 64* 1 37* 2 67* 0 26*       Micro  Results from last 7 days   Lab Units 11/23/22  1804 11/22/22  1700 11/20/22  1322 11/20/22  1256 11/20/22  1245 11/20/22  1220   BLOOD CULTURE   --  No Growth at 48 hrs  No Growth at 48 hrs   --   --  Pseudomonas aeruginosa* Pseudomonas aeruginosa*   GRAM STAIN RESULT  1+ Polys  --   --   --  Gram negative rods* Gram negative rods*   URINE CULTURE   --   --  60,000-69,000 cfu/ml Pseudomonas aeruginosa*  --   --   --    BODY FLUID CULTURE, STERILE  No growth  --   --   --   --   --    MRSA CULTURE ONLY   --   --   --  No Methicillin Resistant Staphlyococcus aureus (MRSA) isolated  --   --        Imaging: I have personally reviewed pertinent reports  Intake and Output  I/O       11/23 0701 11/24 0700 11/24 0701 11/25 0700 11/25 0701 11/26 0700    P  O  180 0     I V  (mL/kg)  30 (0 3) 55 2 (0 5)    IV Piggyback 150 100     Total Intake(mL/kg) 330 (3) 130 (1 3) 55 2 (0 5)    Urine (mL/kg/hr) 1325 (0 5) 2875 (1 2) 950 (1 1)    Other 900      Total Output 2225 2875 950    Net -7245 -2745 -894 8                 Height and Weights   Height: 6' (182 9 cm)  IBW (Ideal Body Weight): 77 6 kg  Body mass index is 30 56 kg/m²  Weight (last 2 days)     Date/Time Weight    11/25/22 0551 102 (225 31)    11/24/22 1708 102 (225 31)    11/23/22 0600 109 (240 08)            Nutrition       Diet Orders   (From admission, onward)             Start     Ordered    11/25/22 1535  Diet Dysphagia/Modified Consistency; Dysphagia 2-Mechanical Soft;  Thin Liquid  Diet effective now        References:    Nutrtion Support Algorithm Enteral vs  Parenteral   Question Answer Comment   Diet Type Dysphagia/Modified Consistency    Dysphagia/Modified Consistency Dysphagia 2-Mechanical Soft    Liquid Modifier Thin Liquid    RD to adjust diet per protocol?  Yes        11/25/22 1535                  Active Medications  Scheduled Meds:  Current Facility-Administered Medications   Medication Dose Route Frequency Provider Last Rate   • apixaban  5 mg Oral BID Franco Dior MD     • aspirin  81 mg Oral Daily Franco Dior MD     • budesonide  0 5 mg Nebulization Q12H Franco Dior MD     • bumetanide  1 mg Intravenous BID Rey Haider MD     • carvedilol  6 25 mg Oral BID With Meals Franco Dior MD     • cefTAZidime  2,000 mg Intravenous Q24H Lizzy Watts MD Stopped (11/25/22 1440)   • fluticasone  1 puff Inhalation Q12H [address] Franco Dior MD     • formoterol  20 mcg Nebulization Q12H Franco Dior MD     • gabapentin  100 mg Oral TID Franco Dior MD     • heparin (porcine)  3-20 Units/kg/hr (Order-Specific) Intravenous Titrated Franco Dior MD     • heparin (porcine)  2,000 Units Intravenous Q1H PRN Franco Dior MD     • heparin (porcine)  4,000 Units Intravenous Q1H PRN Franco Dior MD     • insulin glargine  40 Units Subcutaneous HS Franco Dior MD     • insulin lispro  1-6 Units Subcutaneous Q6H Franco Dior MD     • ipratropium  0 5 mg Nebulization Q6H Franco Dior MD     • isosorbide mononitrate  30 mg Oral Daily Franco Dior MD     • levalbuterol  1 25 mg Nebulization Q6H Franco Dior MD     • ondansetron  4 mg Intravenous Q6H PRN Franco Dior MD     • pantoprazole  40 mg Oral BID AC Franco Dior MD     • pravastatin  80 mg Oral Daily With Li Brumfield MD     • senna-docusate sodium  1 tablet Oral HS Franco Dior MD     • tamsulosin  0 4 mg Oral Daily With Dinner Frnaco Dior MD       Continuous Infusions:  heparin (porcine), 3-20 Units/kg/hr (Order-Specific)      PRN Meds:   heparin (porcine), 2,000 Units, Q1H PRN  heparin (porcine), 4,000 Units, Q1H PRN  ondansetron, 4 mg, Q6H PRN        Allergies   Allergies   Allergen Reactions   • Crestor [Rosuvastatin] Other (See Comments)     Unknown     • Lisinopril GI Intolerance, Dizziness and Abdominal Pain   • Metformin GI Intolerance     ---------------------------------------------------------------------------------------  Advance Directive and Living Will:      Power of :    POLST:    ---------------------------------------------------------------------------------------  Care Time Delivered:   Upon my evaluation, this patient had a high probability of imminent or life-threatening deterioration due to Acute respiratory failure, which required my direct attention, intervention, and personal management  I have personally provided 60 minutes of critical care time, exclusive of procedures, teaching, family meetings, and any prior time recorded by providers other than myself  Carry MD Vasile      Portions of the record may have been created with voice recognition software  Occasional wrong word or "sound a like" substitutions may have occurred due to the inherent limitations of voice recognition software    Read the chart carefully and recognize, using context, where substitutions have occurred

## 2022-11-25 NOTE — PROGRESS NOTES
Progress Note - Infectious Disease   Grady Damico Sr  78 y o  male MRN: [id number]  Unit/Bed#: MICU 13 Encounter: [id number]      Impression/Plan:  1  Sepsis-present on admission   Leukocytosis and tachypnea   Appears to be secondary to Pseudomonas bacteremia from urinary tract infection  No other definitive source appreciated   The patient has remained hemodynamically stable and seems to be tolerating the antibiotics without difficulty  -continue antibiotics as below  -recheck CBC with diff and BMP  -supportive care     2  Pseudomonas bacteremia-appears to be secondary to urinary tract infection in the setting of urinary retention   No other clear source appreciated   Could represent bacteremia from a pneumonia but the patient's pulmonary status seems to be around baseline and the CT scan has some chronic findings of the right base  Repeat blood cultures negative thus far  Possible cognitive decline related the cefepime   -discontinue cefepime  -begin ceftazidime 2 g IV Q 24 hours  -if creatinine clearance increases to greater than 30, increased ceftazidime dose to 2 g IV Q 12 hours  -follow up repeat blood cultures to confirm clearance  -plan IV antibiotics through at least 11/29/2022 which will complete 10 days of any pseudomonal coverage     3  Pseudomonas UTI-in the setting of urinary retention   The patient now has a Cook catheter in place  Ultrasound without hydronephrosis or abscess  -antibiotics as above  -Cook drainage  -monitor symptomatology       4  Acute kidney injury-complicating chronic kidney disease   Renal function worsening   Suspected multifactorial including cardiorenal syndrome, sepsis, and possible ATN   Pre renal issues may be playing a role   -dose adjust antibiotics as needed  -recheck BMP  -volume management  -check renal ultrasound  -close nephrology follow-up     5   Acute hypoxic respiratory failure-with increased pleural effusion on the setting of known adenocarcinoma of the lung   Volume loss the right base seems somewhat chronic  His respiratory status has deteriorated and he is now back on BiPAP  He underwent thoracentesis on 2022  Repeat chest x-ray stable  -follow-up pleural fluid cytology and culture  -BiPAP support  -monitor respiratory status     6  Acute encephalopathy-suspect multifactorial including metabolic issues, sepsis  Waxing waning but now with decreased cognition in the setting of CO2 retention  Improvement of the CO2 retention but still with some cognitive abnormalities  Possible medication effect from cefepime  -treatment of respiratory failure  -monitor cognition  -change antibiotics as above     7  Diabetes mellitus-type 2 with hyperglycemia with long-term insulin use     Discussed the above management plan with the critical care service    Will see the patient again 2022  Please tiger text me over the weekend if questions  Antibiotics:  Cefepime 6    Subjective:  Patient moved to the intensive care unit yesterday due to persistent BiPAP requirement  His cognition has improved a bit but he still remains a bit confused  Patient has no fever, chills, sweats; no nausea, vomiting, diarrhea; no cough, shortness of breath; no pain  No new symptoms  Objective:  Vitals:  Temp:  [97 3 °F (36 3 °C)-97 9 °F (36 6 °C)] 97 9 °F (36 6 °C)  HR:  [76-93] 78  Resp:  [13-26] 13  BP: ()/(47-72) 127/59  SpO2:  [94 %-100 %] 97 %  Temp (24hrs), Av 6 °F (36 4 °C), Min:97 3 °F (36 3 °C), Max:97 9 °F (36 6 °C)  Current: Temperature: 97 9 °F (36 6 °C)    Physical Exam:   General Appearance:  Somnolent, arousable, nontoxic, no acute distress  Throat: Oropharynx moist without lesions  Lungs:   Decreased breath sounds bilaterally; no wheezes, rhonchi or rales; respirations unlabored   Heart:  RRR; no murmur, rub or gallop   Abdomen:   Soft, non-tender, non-distended, positive bowel sounds       Extremities: No clubbing, cyanosis or edema   Skin: No new rashes or lesions  No draining wounds noted  Labs, Imaging, & Other studies:   All pertinent labs and imaging studies were personally reviewed  Results from last 7 days   Lab Units 11/25/22  0809 11/25/22  0449 11/24/22  0607   WBC Thousand/uL 6 55 6 28 8 00   HEMOGLOBIN g/dL 6 7* 7 1* 7 7*   PLATELETS Thousands/uL 135* 135* 103*     Results from last 7 days   Lab Units 11/25/22  0449 11/24/22  2034 11/24/22  1054 11/24/22  0607 11/22/22  0521 11/21/22  0636 11/20/22  1102   SODIUM mmol/L 140 140  --  134*   < > 138 137   POTASSIUM mmol/L 4 6 4 8   < > 5 5*   < > 4 0 4 1   CHLORIDE mmol/L 105 103  --  105   < > 101 102   CO2 mmol/L 35* 34*  --  21   < > 33* 29   BUN mg/dL 59* 58*  --  61*   < > 38* 31*   CREATININE mg/dL 2 84* 3 04*  --  3 24*   < > 2 32* 2 01*   EGFR ml/min/1 73sq m 20 19  --  17   < > 26 31   CALCIUM mg/dL 8 5 8 7  --  8 3   < > 8 3 8 4   AST U/L 6  --   --   --   --  9 7   ALT U/L 7*  --   --   --   --  10* 12   ALK PHOS U/L 59  --   --   --   --  69 84    < > = values in this interval not displayed  Results from last 7 days   Lab Units 11/23/22  1804 11/22/22  1700 11/20/22  1322 11/20/22  1256 11/20/22  1245 11/20/22  1220   BLOOD CULTURE   --  No Growth at 48 hrs    No Growth at 48 hrs   --   --  Pseudomonas aeruginosa* Pseudomonas aeruginosa*   GRAM STAIN RESULT  1+ Polys  --   --   --  Gram negative rods* Gram negative rods*   URINE CULTURE   --   --  60,000-69,000 cfu/ml Pseudomonas aeruginosa*  --   --   --    BODY FLUID CULTURE, STERILE  No growth  --   --   --   --   --    MRSA CULTURE ONLY   --   --   --  No Methicillin Resistant Staphlyococcus aureus (MRSA) isolated  --   --      Results from last 7 days   Lab Units 11/25/22  0449 11/23/22  0451 11/21/22  0636 11/20/22  1102   PROCALCITONIN ng/ml 0 64* 1 37* 2 67* 0 26*         Results from last 7 days   Lab Units 11/22/22  1703   FERRITIN ng/mL 63        chest x-ray-stable right-sided effusion atelectasis    Images personally reviewed by me in PACS

## 2022-11-25 NOTE — PROGRESS NOTES
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  3  Acute on chronic diastolic congestive heart failure, Volume overload, continue with Bumex 1 mg intravenously b i d , monitor ins and outs, follow daily weights    4  Hemodynamics, blood pressure stable, avoid relative hypotension    5  Anemia, suspected component of chronic kidney disease, hemoglobin low but stable in the high sevens, monitor H&H and transfusion for hemoglobin less than 7    6  Sepsis, pneumonia, antibiotics per primary team     7  Encephalopathy, secondary to hypercapnia, improving with BiPAP  Management per critical care team    Plan and recommendation were discussed with critical care Medicine team    SUBJECTIVE:  Patient seen and examined, was transferred to the ICU due to mental status changes, was placed on BiPAP    During my evaluation this morning his more awake and interactive, denies any chest pain, no shortness of breath, no nausea, no vomiting      OBJECTIVE:  Current Weight: Weight - Scale: 102 kg (225 lb 5 oz)  Vitals:    11/25/22 0717   BP:    Pulse:    Resp:    Temp:    SpO2: 100%       Intake/Output Summary (Last 24 hours) at 11/25/2022 0587  Last data filed at 11/25/2022 0815  Gross per 24 hour   Intake 80 ml   Output 2875 ml   Net -2795 ml     General:  More awake and interactive, on BiPAP, cooperative, in not acute distress  Eyes: conjunctivae pink, anicteric sclerae  ENT:  On BiPAP  Neck: supple, no JVD  Chest:  Coarse breath sounds bilateral, no crackles, ronchus or wheezings  CVS: distinct S1 & S2, normal rate, regular rhythm  Abdomen:  Obese, non-tender, non-distended, normoactive bowel sounds  Extremities:  Positive edema of both legs  Skin: no rash  Neuro: awake, alert, interactive          Medications:    Current Facility-Administered Medications:   •  aspirin (ECOTRIN LOW STRENGTH) EC tablet 81 mg, 81 mg, Oral, Daily, Malorie Yee MD, 81 mg at 11/23/22 7056  •  budesonide (PULMICORT) inhalation solution 0 5 mg, 0 5 mg, Nebulization, Q12H, Malorie Yee MD, 0 5 mg at 11/25/22 0715  •  bumetanide (BUMEX) injection 1 mg, 1 mg, Intravenous, BID, Israel Quesada MD, 1 mg at 11/25/22 0140  •  carvedilol (COREG) tablet 6 25 mg, 6 25 mg, Oral, BID With Meals, Malorie Yee MD, 6 25 mg at 11/23/22 1819  •  cefepime (MAXIPIME) 1,000 mg in dextrose 5 % 50 mL IVPB, 1,000 mg, Intravenous, Q12H, Israel Quesada MD, Stopped at 11/25/22 0401  •  fluticasone (FLOVENT HFA) 110 MCG/ACT inhaler 1 puff, 1 puff, Inhalation, Q12H [address], Malorie Yee MD, 1 puff at 11/24/22 4157  •  formoterol (PERFOROMIST) nebulizer solution 20 mcg, 20 mcg, Nebulization, Q12H, Malorie Yee MD, 20 mcg at 11/24/22 1910  •  gabapentin (NEURONTIN) capsule 100 mg, 100 mg, Oral, TID, Malorie Yee MD, 100 mg at 11/23/22 2220  •  heparin (porcine) 25,000 units in 0 45% NaCl 250 mL infusion (premix), 3-20 Units/kg/hr (Order-Specific), Intravenous, Titrated, Malorie Yee MD, Last Rate: 10 mL/hr at 11/25/22 0811, 11 1 Units/kg/hr at 11/25/22 0811  •  heparin (porcine) injection 2,000 Units, 2,000 Units, Intravenous, Q1H PRN, Malorie Yee MD  •  heparin (porcine) injection 4,000 Units, 4,000 Units, Intravenous, Q1H PRN, Malorie Yee MD  •  insulin glargine (LANTUS) subcutaneous injection 40 Units 0 4 mL, 40 Units, Subcutaneous, HS, Malorie Yee MD, 40 Units at 11/25/22 0140  •  insulin lispro (HumaLOG) 100 units/mL subcutaneous injection 1-6 Units, 1-6 Units, Subcutaneous, Q6H **AND** Fingerstick Glucose (POCT), , , Q6H, Ceci Guardado MD  •  ipratropium (ATROVENT) 0 02 % inhalation solution 0 5 mg, 0 5 mg, Nebulization, Q6H, Ceci Guardado MD, 0 5 mg at 11/25/22 0715  •  isosorbide mononitrate (IMDUR) 24 hr tablet 30 mg, 30 mg, Oral, Daily, Ceci Guardado MD, 30 mg at 11/23/22 7862  •  levalbuterol ([place] Row) inhalation solution 1 25 mg, 1 25 mg, Nebulization, Q6H, Ceci Guardado MD, 1 25 mg at 11/25/22 0715  •  ondansetron (ZOFRAN) injection 4 mg, 4 mg, Intravenous, Q6H PRN, eCci Guardado MD  •  pantoprazole (PROTONIX) EC tablet 40 mg, 40 mg, Oral, BID AC, Ceci Guardado MD, 40 mg at 11/23/22 1819  •  pravastatin (PRAVACHOL) tablet 80 mg, 80 mg, Oral, Daily With Solis Hilario MD, 80 mg at 11/23/22 1819  •  senna-docusate sodium (SENOKOT S) 8 6-50 mg per tablet 1 tablet, 1 tablet, Oral, HS, Ceci Guardado MD, 1 tablet at 11/23/22 2220  •  tamsulosin (FLOMAX) capsule 0 4 mg, 0 4 mg, Oral, Daily With Dinner, Ceci Guardado MD, 0 4 mg at 11/23/22 1819    Invasive Devices:   Urethral Catheter Straight-tip 18 Fr   (Active)   Amt returned on insertion(mL) 300 mL 11/20/22 1309   Reasons to continue Urinary Catheter  Acute urinary retention/obstruction failing urinary retention protocol 11/24/22 0901   Goal for Removal Voiding trial when ambulation improves 11/24/22 0901   Site Assessment Clean;Skin intact 11/24/22 0901   Cook Care Done 11/24/22 0900   Collection Container Standard drainage bag 11/24/22 0901   Output (mL) 700 mL 11/24/22 1101       Lab Results:   Results from last 7 days   Lab Units 11/25/22  0449 11/24/22  2034 11/24/22  1054 11/24/22  0607 11/23/22  0451 11/22/22  0521 11/21/22  0636 11/20/22  2241 11/20/22  1102   WBC Thousand/uL 6 28  --   --  8 00 10 58*   < > 17 90*  --  17 88*   HEMOGLOBIN g/dL 7 1*  --   --  7 7* 7 8*   < > 7 5*  --  8 7*   HEMATOCRIT % 23 7*  --   --  27 9* 26 5*   < > 26 0*  --  29 6* PLATELETS Thousands/uL 135*  --   --  103* 143*   < > 153  --  183   SODIUM mmol/L 140 140  --  134* 137   < > 138  --  137   POTASSIUM mmol/L 4 6 4 8 4 7 5 5* 4 5   < > 4 0  --  4 1   CHLORIDE mmol/L 105 103  --  105 101   < > 101  --  102   CO2 mmol/L 35* 34*  --  21 30   < > 33*  --  29   BUN mg/dL 59* 58*  --  61* 60*   < > 38*  --  31*   CREATININE mg/dL 2 84* 3 04*  --  3 24* 3 26*   < > 2 32*  --  2 01*   CALCIUM mg/dL 8 5 8 7  --  8 3 8 4   < > 8 3  --  8 4   MAGNESIUM mg/dL 2 5 2 6  --  3 0*  --    < > 2 3  --   --    PHOSPHORUS mg/dL 3 9 4 4*  --  5 0*  --    < > 4 3*   < >  --    ALK PHOS U/L 59  --   --   --   --   --  69  --  84   ALT U/L 7*  --   --   --   --   --  10*  --  12   AST U/L 6  --   --   --   --   --  9  --  7    < > = values in this interval not displayed  Previous work up:  See previous notes      Portions of the record may have been created with voice recognition software  Occasional wrong word or "sound a like" substitutions may have occurred due to the inherent limitations of voice recognition software  Read the chart carefully and recognize, using context, where substitutions have occurred  If you have any questions, please contact the dictating provider

## 2022-11-26 LAB
ANION GAP SERPL CALCULATED.3IONS-SCNC: 6 MMOL/L (ref 4–13)
ARTERIAL PATENCY WRIST A: YES
BACTERIA SPEC BFLD CULT: NO GROWTH
BASE EX.OXY STD BLDV CALC-SCNC: 82.8 % (ref 60–80)
BASE EXCESS BLDV CALC-SCNC: 6.6 MMOL/L
BASOPHILS # BLD AUTO: 0.02 THOUSANDS/ÂΜL (ref 0–0.1)
BASOPHILS NFR BLD AUTO: 0 % (ref 0–1)
BUN SERPL-MCNC: 58 MG/DL (ref 5–25)
CALCIUM SERPL-MCNC: 8.3 MG/DL (ref 8.3–10.1)
CHLORIDE SERPL-SCNC: 104 MMOL/L (ref 96–108)
CO2 SERPL-SCNC: 32 MMOL/L (ref 21–32)
CREAT SERPL-MCNC: 2.58 MG/DL (ref 0.6–1.3)
EOSINOPHIL # BLD AUTO: 0.08 THOUSAND/ÂΜL (ref 0–0.61)
EOSINOPHIL NFR BLD AUTO: 1 % (ref 0–6)
ERYTHROCYTE [DISTWIDTH] IN BLOOD BY AUTOMATED COUNT: 14.9 % (ref 11.6–15.1)
GFR SERPL CREATININE-BSD FRML MDRD: 23 ML/MIN/1.73SQ M
GLUCOSE SERPL-MCNC: 117 MG/DL (ref 65–140)
GLUCOSE SERPL-MCNC: 142 MG/DL (ref 65–140)
GLUCOSE SERPL-MCNC: 180 MG/DL (ref 65–140)
GLUCOSE SERPL-MCNC: 196 MG/DL (ref 65–140)
GLUCOSE SERPL-MCNC: 235 MG/DL (ref 65–140)
GLUCOSE SERPL-MCNC: 248 MG/DL (ref 65–140)
GLUCOSE SERPL-MCNC: 266 MG/DL (ref 65–140)
GLUCOSE SERPL-MCNC: 65 MG/DL (ref 65–140)
GLUCOSE SERPL-MCNC: 85 MG/DL (ref 65–140)
GRAM STN SPEC: NORMAL
HCO3 BLDV-SCNC: 29.6 MMOL/L (ref 24–30)
HCT VFR BLD AUTO: 26 % (ref 36.5–49.3)
HGB BLD-MCNC: 7.4 G/DL (ref 12–17)
IMM GRANULOCYTES # BLD AUTO: 0.04 THOUSAND/UL (ref 0–0.2)
IMM GRANULOCYTES NFR BLD AUTO: 1 % (ref 0–2)
LYMPHOCYTES # BLD AUTO: 0.75 THOUSANDS/ÂΜL (ref 0.6–4.47)
LYMPHOCYTES NFR BLD AUTO: 12 % (ref 14–44)
MAGNESIUM SERPL-MCNC: 2.5 MG/DL (ref 1.6–2.6)
MCH RBC QN AUTO: 25.3 PG (ref 26.8–34.3)
MCHC RBC AUTO-ENTMCNC: 28.5 G/DL (ref 31.4–37.4)
MCV RBC AUTO: 89 FL (ref 82–98)
MONOCYTES # BLD AUTO: 0.67 THOUSAND/ÂΜL (ref 0.17–1.22)
MONOCYTES NFR BLD AUTO: 11 % (ref 4–12)
NEUTROPHILS # BLD AUTO: 4.5 THOUSANDS/ÂΜL (ref 1.85–7.62)
NEUTS SEG NFR BLD AUTO: 75 % (ref 43–75)
NRBC BLD AUTO-RTO: 0 /100 WBCS
O2 CT BLDV-SCNC: 10.1 ML/DL
PCO2 BLDV: 35.7 MM HG (ref 42–50)
PH BLDV: 7.54 [PH] (ref 7.3–7.4)
PHOSPHATE SERPL-MCNC: 3.5 MG/DL (ref 2.3–4.1)
PLATELET # BLD AUTO: 136 THOUSANDS/UL (ref 149–390)
PMV BLD AUTO: 11.3 FL (ref 8.9–12.7)
PO2 BLDV: 50.9 MM HG (ref 35–45)
POTASSIUM SERPL-SCNC: 4.3 MMOL/L (ref 3.5–5.3)
RBC # BLD AUTO: 2.92 MILLION/UL (ref 3.88–5.62)
SODIUM SERPL-SCNC: 142 MMOL/L (ref 135–147)
WBC # BLD AUTO: 6.06 THOUSAND/UL (ref 4.31–10.16)

## 2022-11-26 RX ORDER — QUETIAPINE FUMARATE 25 MG/1
25 TABLET, FILM COATED ORAL
Status: DISCONTINUED | OUTPATIENT
Start: 2022-11-26 | End: 2022-11-27

## 2022-11-26 RX ORDER — DEXTROSE MONOHYDRATE 25 G/50ML
25 INJECTION, SOLUTION INTRAVENOUS ONCE
Status: COMPLETED | OUTPATIENT
Start: 2022-11-26 | End: 2022-11-26

## 2022-11-26 RX ORDER — INSULIN GLARGINE 100 [IU]/ML
30 INJECTION, SOLUTION SUBCUTANEOUS
Status: DISCONTINUED | OUTPATIENT
Start: 2022-11-26 | End: 2022-11-27

## 2022-11-26 RX ADMIN — INSULIN GLARGINE 30 UNITS: 100 INJECTION, SOLUTION SUBCUTANEOUS at 21:30

## 2022-11-26 RX ADMIN — GABAPENTIN 100 MG: 100 CAPSULE ORAL at 08:25

## 2022-11-26 RX ADMIN — APIXABAN 5 MG: 5 TABLET, FILM COATED ORAL at 08:25

## 2022-11-26 RX ADMIN — CEFTAZIDIME 2000 MG: 1 INJECTION, POWDER, FOR SOLUTION INTRAMUSCULAR; INTRAVENOUS at 12:47

## 2022-11-26 RX ADMIN — QUETIAPINE FUMARATE 25 MG: 25 TABLET ORAL at 21:29

## 2022-11-26 RX ADMIN — INSULIN LISPRO 1 UNITS: 100 INJECTION, SOLUTION INTRAVENOUS; SUBCUTANEOUS at 18:16

## 2022-11-26 RX ADMIN — GABAPENTIN 100 MG: 100 CAPSULE ORAL at 16:24

## 2022-11-26 RX ADMIN — GABAPENTIN 100 MG: 100 CAPSULE ORAL at 21:30

## 2022-11-26 RX ADMIN — BUDESONIDE 0.5 MG: 0.5 INHALANT ORAL at 20:28

## 2022-11-26 RX ADMIN — LEVALBUTEROL HYDROCHLORIDE 1.25 MG: 1.25 SOLUTION, CONCENTRATE RESPIRATORY (INHALATION) at 08:40

## 2022-11-26 RX ADMIN — INSULIN LISPRO 3 UNITS: 100 INJECTION, SOLUTION INTRAVENOUS; SUBCUTANEOUS at 12:17

## 2022-11-26 RX ADMIN — CARVEDILOL 6.25 MG: 6.25 TABLET, FILM COATED ORAL at 16:24

## 2022-11-26 RX ADMIN — FLUTICASONE PROPIONATE 1 PUFF: 110 AEROSOL, METERED RESPIRATORY (INHALATION) at 21:33

## 2022-11-26 RX ADMIN — BUMETANIDE 1 MG: 0.25 INJECTION, SOLUTION INTRAMUSCULAR; INTRAVENOUS at 18:20

## 2022-11-26 RX ADMIN — BUMETANIDE 1 MG: 0.25 INJECTION, SOLUTION INTRAMUSCULAR; INTRAVENOUS at 08:27

## 2022-11-26 RX ADMIN — PANTOPRAZOLE SODIUM 40 MG: 40 TABLET, DELAYED RELEASE ORAL at 06:20

## 2022-11-26 RX ADMIN — APIXABAN 5 MG: 5 TABLET, FILM COATED ORAL at 18:16

## 2022-11-26 RX ADMIN — BUDESONIDE 0.5 MG: 0.5 INHALANT ORAL at 08:40

## 2022-11-26 RX ADMIN — LEVALBUTEROL HYDROCHLORIDE 1.25 MG: 1.25 SOLUTION, CONCENTRATE RESPIRATORY (INHALATION) at 14:53

## 2022-11-26 RX ADMIN — ASPIRIN 81 MG: 81 TABLET, COATED ORAL at 08:24

## 2022-11-26 RX ADMIN — PRAVASTATIN SODIUM 80 MG: 80 TABLET ORAL at 16:24

## 2022-11-26 RX ADMIN — IPRATROPIUM BROMIDE 0.5 MG: 0.5 SOLUTION RESPIRATORY (INHALATION) at 20:28

## 2022-11-26 RX ADMIN — ISOSORBIDE MONONITRATE 30 MG: 30 TABLET, EXTENDED RELEASE ORAL at 08:24

## 2022-11-26 RX ADMIN — TAMSULOSIN HYDROCHLORIDE 0.4 MG: 0.4 CAPSULE ORAL at 16:24

## 2022-11-26 RX ADMIN — FORMOTEROL FUMARATE DIHYDRATE 20 MCG: 20 SOLUTION RESPIRATORY (INHALATION) at 20:28

## 2022-11-26 RX ADMIN — FLUTICASONE PROPIONATE 1 PUFF: 110 AEROSOL, METERED RESPIRATORY (INHALATION) at 08:26

## 2022-11-26 RX ADMIN — IPRATROPIUM BROMIDE 0.5 MG: 0.5 SOLUTION RESPIRATORY (INHALATION) at 08:40

## 2022-11-26 RX ADMIN — SENNOSIDES AND DOCUSATE SODIUM 1 TABLET: 8.6; 5 TABLET ORAL at 21:30

## 2022-11-26 RX ADMIN — DEXTROSE MONOHYDRATE 25 ML: 25 INJECTION, SOLUTION INTRAVENOUS at 06:50

## 2022-11-26 RX ADMIN — PANTOPRAZOLE SODIUM 40 MG: 40 TABLET, DELAYED RELEASE ORAL at 16:24

## 2022-11-26 RX ADMIN — CARVEDILOL 6.25 MG: 6.25 TABLET, FILM COATED ORAL at 08:25

## 2022-11-26 RX ADMIN — IPRATROPIUM BROMIDE 0.5 MG: 0.5 SOLUTION RESPIRATORY (INHALATION) at 14:53

## 2022-11-26 RX ADMIN — LEVALBUTEROL HYDROCHLORIDE 1.25 MG: 1.25 SOLUTION, CONCENTRATE RESPIRATORY (INHALATION) at 20:28

## 2022-11-26 NOTE — PROGRESS NOTES
Daily Progress Note - Critical Care   Pavel Webster Sr  78 y o  male MRN: [id number]  Unit/Bed#: [facility] 201-01 Encounter: [id number]        ----------------------------------------------------------------------------------------  HPI/24hr events:  Rekha is a 68-year-old male with a past medical history of severe COPD, diastolic CHF, atrial fibrillation on Eliquis, lung cancer status post SBRT, type 2 diabetes, stage 3b CKD, GERD, CAD and hypertension who presented to the emergency department 11/20 complaining of worsening shortness of breath   His worsening hypoxic respiratory failure was thought to be secondary to acute CHF exacerbation versus COPD exacerbation versus possible underlying pneumonia   He was placed on high-flow nasal cannula in the emergency department as well as a nitro drip for elevated blood pressure and Lasix   The patient met SIRS criteria and was started on antibiotics and blood cultures were drawn  Beatriz Zheng was also found to have a new near complete occlusion of the middle lobe on the right lower lobe bronchial branches with atelectasis on CT chest   He remained on high-flow nasal cannula and so he was brought to the ICCU for further monitoring  The ICCU the patient was placed on BiPAP due to worsening hypoxic and hypercapnic respiratory failure   Blood and urine cultures were positive for Pseudomonas and he was continued on cefepime and vancomycin was discontinued   The patient's clinical status slowly improved and he was eventually weaned off of BiPAP and down to 3 L nasal cannula   He was later deemed medically stable for downgrade to med surge      On 11/24 critical care team was alerted to assess patient  Per review of notes from day patient was instructed to be compliant with BiPAP on previous night  Per nursing, patient was not compliant overnight he was found to be increasingly lethargic and the morning of 11/24    Initial ABG showed the patient was in respiratory acidosis with a pH of 7 2-9  Patient had elevated pCO2 of 83 5  Respiratory therapy was contacted and patient was subsequently placed back on BiPAP  Follow-up ABG few hours after initiating BiPAP revealed a pH improved but still acidotic at 7 3  Patient had slightly improved pCO2 is 73 5 at that time as well  Patient had repeat chest x-ray today which is concerning for a possible new left basilar pneumonia, the patient already on antibiotic therapy  After evaluation of critical care team, patient is appropriate for step-down level 1  Patient was acutely obtunded  Patient responded to sternal rub and at that point was able to follow basic commands such as squeezing hands  Perfect patient had not taken any narcotics today, blood sugars acceptable and without concern for hypoglycemia  No metabolic derangements  Updated level of care, will continue BiPAP for patient overnight  Stat head CT pending as patient with acute change in mental status and on Eliquis  11/25 patient remained on BiPAP overnight  11/26 refused BiPAP overnight  ---------------------------------------------------------------------------------------  SUBJECTIVE  The patient was seen and examined at bedside  He had no acute events overnight  He refused his BIPAP over night  This morning he was resting comfortably with no acute complaints  AMS improving  Review of Systems   Constitutional: Negative for activity change, appetite change, chills, fatigue and fever  HENT: Negative for congestion, ear discharge, ear pain, hearing loss, sinus pain, sore throat, tinnitus and trouble swallowing  Eyes: Negative for pain and visual disturbance  Respiratory: Negative for cough, chest tightness, shortness of breath and wheezing  Cardiovascular: Negative for chest pain and leg swelling  Gastrointestinal: Negative for abdominal distention, abdominal pain, anal bleeding, blood in stool, constipation, diarrhea, nausea, rectal pain and vomiting     Endocrine: Negative for cold intolerance and heat intolerance  Genitourinary: Negative for difficulty urinating, dysuria and frequency  Musculoskeletal: Negative for arthralgias and myalgias  Skin: Negative for rash  Allergic/Immunologic: Negative for environmental allergies and food allergies  Neurological: Negative for dizziness, light-headedness, numbness and headaches  Hematological: Negative for adenopathy  Psychiatric/Behavioral: Positive for agitation  Negative for behavioral problems, confusion and decreased concentration        ---------------------------------------------------------------------------------------  Assessment and Plan:    Neuro:   • Diagnosis:  Toxic metabolic encephalopathy  ? Likely secondary to acute hypoxic, hypercarbic respiratory failure with possible contraction alkalosis  ? Continue BiPAP mask QHS and PRN  Though patient persistently noncompliant with BiPAP overnight  ? Monitor ABG closely  ? Continue delirium precautions  ? CT head negative  ? Cefepime transitioned to ceftazidime in setting of persistent AMS  ? 11/24 patient obtunded, noncompliant with BiPAP overnight     ? 11/25 patient compliant with BiPAP in slowly improving  ? 11/26 patient again refused BiPAP  ? Downgraded to step-down unit  ? Will consider starting Seroquel q h s   ? Will consider neuropsych evaluation     CV:   • Diagnosis:  AFib  ? Plan: Continue home Eliquis and carvedilol  ? Continue telemetry monitoring  • Diagnosis:  CAD  ? Plan:  Status post stent placement in 2015  ? Stress test negative in May of this year  ? Continue aspirin, statin, beta-blocker  • Diagnosis:  HTN  ? Plan:  Monitor blood pressure closely  ? Continue carvedilol  ? Continue Bumex  • Diagnosis:  HLD  ? Plan:  Continue statin  • Diagnosis:  Acute on chronic diastolic heart failure  ? Plan:  Continue Bumex  ? Continue fluid restriction and sodium restriction  ?  Monitor I's and O's and daily weights        Pulm:  • Diagnosis: Pneumonia  ? Plan: CXR showed some concern for pneumonia  ? CT chest earlier in admission showed increase in right pleural effusion with new small left effusion, new near complete occlusion of right middle lobe and right lower lobe bronchial branches with atelectasis and new small perihepatic ascites  ? Follow-up blood culture, MRSA culture and urine culture  - Blood and urine culture positive for Pseudomonas   - Cefepime transitioned to ceftazidime in setting of persistent AMS  - Continue ceftazidime 2 g Q 24  If creatinine clearance rises above 30 will change dosing to q 12  ? Monitor WBC count and fever curve  • Diagnosis:  Lung adenocarcinoma  ? Plan: s/p resection and radiation therapy in the past  • Diagnosis:  Right-sided pleural effusion  • Plan: s/p IR thoracentesis 11/23  • Diagnosis:  COPD  ? Plan:  History of COPD on 2 L NC at baseline  ? Continue nebulizers with Xopenex and Atrovent  - Added Perforomist and budesonide b i d   ? Continue to wean supplemental oxygen as able  • Diagnosis:  Acute on chronic respiratory failure with hypoxia and hypercapnia  ? Plan:  Likely secondary to pneumonia versus CHF exacerbation versus COPD exacerbation  ? Continue plan for pneumonia as above  ? Monitor ABG  ? Continue BIPAP as able, though patient persistently noncompliant overnight  ? Continue Bumex  ? Monitor I's and O's and daily weights  ? Wean supplemental oxygen as able  ? Monitor respiratory status closely        GI:   • Diagnosis:  No active issues        :   • Diagnosis:  Stage IIIB CKD  ? Plan:  Baseline creatinine appears to be from 1 8-2  ? On admission creatinine was at baseline at 2 01  ? Monitor BMP closely  ? Avoid nephrotoxic medications  ? Nephrology following        F/E/N:   • Plan:  Monitor daily BMP        Heme/Onc:   • Diagnosis:  Lung adenocarcinoma  ? Plan: s/p resection and radiation therapy in the past        Endo:   • Diagnosis:  Type 2 diabetes mellitus  ?  Plan:  Home insulin regimen includes 30 units of long-acting at bedtime and 10 units short-acting with meals  ? Continue basal/prandial insulin with additional SSI coverage  ? Gabapentin for neuropathy  ? Monitor blood sugars closely        ID:   • Diagnosis:  Sepsis  ? Plan:  Patient presented with leukocytosis and tachypnea  ? Sources likely secondary to UTI vs pneumonia  - Blood and urine cultures growing Pseudomonas  ? Cefepime transitioned to ceftazidime given persistent AMS  ? Monitor WBC count and fever curve  ? Case management following regarding possible need for long-term placement -> patient reportedly faces of action due to hoarding behaviors         MSK/Skin:   ? Diagnosis:  PT/OT consulted  - OOB when able  - Frequent turning and repositioning      Patient appropriate for transfer out of the ICU today?: Yes  Disposition: Transfer to Stepdown Level 1   Code Status: Level 1 - Full Code  ---------------------------------------------------------------------------------------  ICU CORE MEASURES    Prophylaxis   VTE Pharmacologic Prophylaxis: Heparin Drip  VTE Mechanical Prophylaxis: sequential compression device  Stress Ulcer Prophylaxis: Pantoprazole PO      Invasive Devices Review  Invasive Devices     Peripheral Intravenous Line  Duration           Peripheral IV 11/24/22 Left Antecubital 2 days    Peripheral IV 11/24/22 Distal;Right;Ventral (anterior) Forearm 1 day    Peripheral IV 11/24/22 Proximal;Right;Ventral (anterior) Forearm 1 day          Drain  Duration           Urethral Catheter Straight-tip 18 Fr  6 days              Can any invasive devices be discontinued today?  Not applicable  ---------------------------------------------------------------------------------------  OBJECTIVE    Vitals   Vitals:    11/26/22 0850 11/26/22 1100 11/26/22 1130 11/26/22 1330   BP:   126/56    BP Location:       Pulse:   72 86   Resp:       Temp:  97 6 °F (36 4 °C)     TempSrc:  Oral     SpO2: 100%  100% 96%   Weight:       Height: Temp (24hrs), Av 2 °F (36 8 °C), Min:97 6 °F (36 4 °C), Max:98 9 °F (37 2 °C)  Current: Temperature: 97 6 °F (36 4 °C)      Respiratory:  SpO2: SpO2: 96 %  Nasal Cannula O2 Flow Rate (L/min): 3 L/min    Invasive/non-invasive ventilation settings   Respiratory    Lab Data (Last 4 hours)    None         O2/Vent Data (Last 4 hours)    None                Physical Exam  Constitutional:       Appearance: He is well-developed  He is obese  HENT:      Head: Normocephalic and atraumatic  Nose: Nose normal    Eyes:      General:         Right eye: No discharge  Left eye: No discharge  Conjunctiva/sclera: Conjunctivae normal       Pupils: Pupils are equal, round, and reactive to light  Neck:      Thyroid: No thyromegaly  Cardiovascular:      Rate and Rhythm: Normal rate  Rhythm irregular  Heart sounds: Normal heart sounds  No murmur heard  No friction rub  No gallop  Pulmonary:      Effort: No respiratory distress  Breath sounds: Normal breath sounds  No stridor  No wheezing or rales  Comments: 3L NC  Chest:      Chest wall: No tenderness  Abdominal:      General: Bowel sounds are normal  There is no distension  Palpations: Abdomen is soft  There is no mass  Tenderness: There is no abdominal tenderness  There is no guarding or rebound  Musculoskeletal:         General: No tenderness or deformity  Right lower leg: No edema  Left lower leg: No edema  Lymphadenopathy:      Cervical: No cervical adenopathy  Skin:     General: Skin is warm and dry  Neurological:      Mental Status: He is alert               Laboratory and Diagnostics:  Results from last 7 days   Lab Units 22  0504 22  0809 22  0449 22  0607 22  0451 22  0521 22  0636 22  1102   WBC Thousand/uL 6 06 6 55 6 28 8 00 10 58* 12 99* 17 90* 17 88*   HEMOGLOBIN g/dL 7 4* 6 7* 7 1* 7 7* 7 8* 7 1* 7 5* 8 7*   HEMATOCRIT % 26 0* 21 6* 23 7* 27 9* 26 5* 24 8* 26 0* 29 6*   PLATELETS Thousands/uL 136* 135* 135* 103* 143* 132* 153 183   NEUTROS PCT % 75  --  81* 79*  --   --  89* 90*   MONOS PCT % 11  --  8 11  --   --  7 8     Results from last 7 days   Lab Units 11/26/22  0504 11/25/22 0449 11/24/22 2034 11/24/22  1054 11/24/22  0607 11/23/22  0451 11/22/22  0521 11/21/22  0636 11/20/22  1102   SODIUM mmol/L 142 140 140  --  134* 137 137 138 137   POTASSIUM mmol/L 4 3 4 6 4 8 4 7 5 5* 4 5 4 2 4 0 4 1   CHLORIDE mmol/L 104 105 103  --  105 101 102 101 102   CO2 mmol/L 32 35* 34*  --  21 30 31 33* 29   ANION GAP mmol/L 6 0* 3*  --  8 6 4 4 6   BUN mg/dL 58* 59* 58*  --  61* 60* 46* 38* 31*   CREATININE mg/dL 2 58* 2 84* 3 04*  --  3 24* 3 26* 2 72* 2 32* 2 01*   CALCIUM mg/dL 8 3 8 5 8 7  --  8 3 8 4 8 3 8 3 8 4   GLUCOSE RANDOM mg/dL 85 143* 140  --  196* 250* 258* 257* 375*   ALT U/L  --  7*  --   --   --   --   --  10* 12   AST U/L  --  6  --   --   --   --   --  9 7   ALK PHOS U/L  --  59  --   --   --   --   --  69 84   ALBUMIN g/dL  --  2 1*  --   --   --   --   --  2 2* 2 7*   TOTAL BILIRUBIN mg/dL  --  0 34  --   --   --   --   --  0 42 0 47     Results from last 7 days   Lab Units 11/26/22  0504 11/25/22 0449 11/24/22 2034 11/24/22  0607 11/22/22  0521 11/21/22  0636 11/20/22  2241   MAGNESIUM mg/dL 2 5 2 5 2 6 3 0* 2 5 2 3  --    PHOSPHORUS mg/dL 3 5 3 9 4 4* 5 0* 3 8 4 3* 4 4*      Results from last 7 days   Lab Units 11/25/22  1414 11/25/22  0809 11/20/22  1235   INR   --  1 19 1 08   PTT seconds 40* 33 26          Results from last 7 days   Lab Units 11/20/22  2243 11/20/22  1235   LACTIC ACID mmol/L 1 5 2 0     ABG:  Results from last 7 days   Lab Units 11/25/22  0039   PH ART  7 369   PCO2 ART mm Hg 62 0*   PO2 ART mm Hg 124 1   HCO3 ART mmol/L 35 0*   BASE EXC ART mmol/L 8 5   ABG SOURCE  Radial, Left     VBG:  Results from last 7 days   Lab Units 11/26/22  0504 11/25/22  0039   PH MAYE  7 536*  --    PCO2 MAYE mm Hg 35 7*  --    PO2 MAYE mm Hg 50 9*  --    HCO3 MAYE mmol/L 29 6  --    BASE EXC MAYE mmol/L 6 6  --    ABG SOURCE   --  Radial, Left     Results from last 7 days   Lab Units 11/25/22  0449 11/23/22  0451 11/21/22  0636 11/20/22  1102   PROCALCITONIN ng/ml 0 64* 1 37* 2 67* 0 26*       Micro  Results from last 7 days   Lab Units 11/23/22  1804 11/22/22  1700 11/20/22  1322 11/20/22  1256 11/20/22  1245 11/20/22  1220   BLOOD CULTURE   --  No Growth at 72 hrs  No Growth at 72 hrs   --   --  Pseudomonas aeruginosa* Pseudomonas aeruginosa*   GRAM STAIN RESULT  1+ Polys  --   --   --  Gram negative rods* Gram negative rods*   URINE CULTURE   --   --  60,000-69,000 cfu/ml Pseudomonas aeruginosa*  --   --   --    BODY FLUID CULTURE, STERILE  No growth  --   --   --   --   --    MRSA CULTURE ONLY   --   --   --  No Methicillin Resistant Staphlyococcus aureus (MRSA) isolated  --   --        Imaging: I have personally reviewed pertinent reports  Intake and Output  I/O       11/23 0701  11/24 0700 11/24 0701  11/25 0700 11/25 0701 11/26 0700    P  O  180 0     I V  (mL/kg)  30 (0 3) 55 2 (0 5)    IV Piggyback 150 100     Total Intake(mL/kg) 330 (3) 130 (1 3) 55 2 (0 5)    Urine (mL/kg/hr) 1325 (0 5) 2875 (1 2) 950 (1 1)    Other 900      Total Output 2225 2875 950    Net -1895 -2745 -894 8                 Height and Weights   Height: 6' (182 9 cm)  IBW (Ideal Body Weight): 77 6 kg  Body mass index is 31 11 kg/m²  Weight (last 2 days)     Date/Time Weight    11/26/22 0600 104 (229 4)    11/25/22 0551 102 (225 31)    11/24/22 1708 102 (225 31)            Nutrition       Diet Orders   (From admission, onward)             Start     Ordered    11/25/22 1535  Diet Dysphagia/Modified Consistency; Dysphagia 2-Mechanical Soft;  Thin Liquid  Diet effective now        References:    Nutrtion Support Algorithm Enteral vs  Parenteral   Question Answer Comment   Diet Type Dysphagia/Modified Consistency    Dysphagia/Modified Consistency Dysphagia 2-Mechanical Soft Liquid Modifier Thin Liquid    RD to adjust diet per protocol?  Yes        11/25/22 1535                  Active Medications  Scheduled Meds:  Current Facility-Administered Medications   Medication Dose Route Frequency Provider Last Rate   • apixaban  5 mg Oral BID Will MD Sang     • aspirin  81 mg Oral Daily Will MD Sang     • budesonide  0 5 mg Nebulization Q12H Will MD Sang     • bumetanide  1 mg Intravenous BID Arlene Gutierrez MD     • carvedilol  6 25 mg Oral BID With Meals Will MD Sang     • cefTAZidime  2,000 mg Intravenous Q24H Naomie Alexandre MD 2,000 mg (11/26/22 1247)   • fluticasone  1 puff Inhalation Q12H [facility] Will MD Sang     • formoterol  20 mcg Nebulization Q12H Will MD Sang     • gabapentin  100 mg Oral TID Will MD Sang     • insulin glargine  30 Units Subcutaneous HS Will MD Sang     • insulin lispro  1-6 Units Subcutaneous Q6H Will MD Sang     • ipratropium  0 5 mg Nebulization Q6H Will MD Sang     • isosorbide mononitrate  30 mg Oral Daily Will MD Sang     • levalbuterol  1 25 mg Nebulization Q6H Will MD Sang     • ondansetron  4 mg Intravenous Q6H PRN Will MD Sang     • pantoprazole  40 mg Oral BID AC Will MD Sang     • pravastatin  80 mg Oral Daily With Phylicia Salamanca MD     • senna-docusate sodium  1 tablet Oral HS Will MD Sang     • tamsulosin  0 4 mg Oral Daily With Phylicia Salamanca MD       Continuous Infusions:     PRN Meds:   ondansetron, 4 mg, Q6H PRN        Allergies   Allergies   Allergen Reactions   • Crestor [Rosuvastatin] Other (See Comments)     Unknown     • Lisinopril GI Intolerance, Dizziness and Abdominal Pain   • Metformin GI Intolerance     ---------------------------------------------------------------------------------------  Advance Directive and Living Will:      Power of :    POLST: ---------------------------------------------------------------------------------------  Care Time Delivered:   Upon my evaluation, this patient had a high probability of imminent or life-threatening deterioration due to Acute respiratory failure, which required my direct attention, intervention, and personal management  I have personally provided 60 minutes of critical care time, exclusive of procedures, teaching, family meetings, and any prior time recorded by providers other than myself  Rahat Harper MD      Portions of the record may have been created with voice recognition software  Occasional wrong word or "sound a like" substitutions may have occurred due to the inherent limitations of voice recognition software    Read the chart carefully and recognize, using context, where substitutions have occurred

## 2022-11-26 NOTE — PROGRESS NOTES
NEPHROLOGY PROGRESS NOTE   Carolina Dill  78 y o  male MRN: [id number]  [facility] En[facility]: [id number]      ASSESSMENT & PLAN:  1  Acute kidney injury on top of CKD stage 4 with baseline creatinine reported around 2 5-2 7  Admitted with a creatinine of 2 0, creatinine peak at 3 26 on 11/23  Kidney function improving back to baseline with a creatinine down to 2 58  Continue with diuretics to achieve euvolemia  Avoid relative hypotension  Monitor ins and outs and follow daily labs    2  Chronic kidney disease stage 4 with reported baseline creatinine around 2 5-2 7, recent episode of MARANDA  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 as well as ATN  3  Acute on chronic diastolic congestive heart failure, Volume overload, continue with diuretics to achieve euvolemia  Follow daily weight  Monitor ins and outs    4  Hemodynamics, blood pressure stable, avoid relative hypotension    5  Anemia, suspected component of chronic kidney disease, hemoglobin low but stable in the high sevens, monitor H&H and transfusion for hemoglobin less than 7    6  Sepsis, pneumonia, antibiotics per primary team     7  Acute on chronic hypoxic hypercapnic respiratory failure, previously on BiPAP    8  Encephalopathy, secondary to hypercapnia, improved after BiPAP  Management per critical care team    Plan and recommendation were discussed with critical care Medicine team    SUBJECTIVE:  Patient seen and examined, sitting at the age of the bed, feeling better, denies any chest pain, no shortness of breath, no nausea, no vomiting    Reported patient refused BiPAP last night      OBJECTIVE:  Current Weight: Weight - Scale: 104 kg (229 lb 6 4 oz)  Vitals:    11/26/22 0335   BP: 140/59   Pulse: 68   Resp: 20   Temp: 98 3 °F (36 8 °C)   SpO2: 100%       Intake/Output Summary (Last 24 hours) at 11/26/2022 0752  Last data filed at 11/26/2022 1429  Gross per 24 hour   Intake 975 17 ml   Output 2225 ml   Net -1249 83 ml     General:  Chronically ill, cooperative, in not acute distress  Eyes: conjunctivae pale, anicteric sclerae  ENT: lips and mucous membranes dry  Neck: supple, no JVD  Chest:  Slightly decreased breath sounds at bases, no crackles, ronchus or wheezings  CVS: distinct S1 & S2, normal rate, regular rhythm  Abdomen: soft, non-tender, non-distended, normoactive bowel sounds  Extremities:  Positive edema of both legs  Skin:  Chronic skin changes in lower extremities  Neuro: awake, alert, interactive          Medications:    Current Facility-Administered Medications:   •  apixaban (ELIQUIS) tablet 5 mg, 5 mg, Oral, BID, Taylor Velez MD, 5 mg at 11/25/22 1552  •  aspirin (ECOTRIN LOW STRENGTH) EC tablet 81 mg, 81 mg, Oral, Daily, Taylor Velez MD, 81 mg at 11/23/22 3526  •  budesonide (PULMICORT) inhalation solution 0 5 mg, 0 5 mg, Nebulization, Q12H, Taylor Velez MD, 0 5 mg at 11/25/22 2017  •  bumetanide (BUMEX) injection 1 mg, 1 mg, Intravenous, BID, Joe Vargas MD, 1 mg at 11/25/22 1729  •  carvedilol (COREG) tablet 6 25 mg, 6 25 mg, Oral, BID With Meals, Taylor Velez MD, 6 25 mg at 11/25/22 1552  •  cefTAZidime (FORTAZ) 2,000 mg in sodium chloride 0 9 % 50 mL IVPB, 2,000 mg, Intravenous, Q24H, Stuart Mccallum MD, Stopped at 11/25/22 1440  •  fluticasone (FLOVENT HFA) 110 MCG/ACT inhaler 1 puff, 1 puff, Inhalation, Q12H [facility], Taylor eVlez MD, 1 puff at 11/25/22 2128  •  formoterol (PERFOROMIST) nebulizer solution 20 mcg, 20 mcg, Nebulization, Q12H, Taylor Velez MD, 20 mcg at 11/24/22 1910  •  gabapentin (NEURONTIN) capsule 100 mg, 100 mg, Oral, TID, Taylor Velez MD, 100 mg at 11/25/22 2127  •  insulin glargine (LANTUS) subcutaneous injection 40 Units 0 4 mL, 40 Units, Subcutaneous, HS, Taylor Velez MD, 40 Units at 11/25/22 2127  •  insulin lispro (HumaLOG) 100 units/mL subcutaneous injection 1-6 Units, 1-6 Units, Subcutaneous, Q6H **AND** Fingerstick Glucose (POCT), , , Q6H, Taylor Velez MD  • ipratropium (ATROVENT) 0 02 % inhalation solution 0 5 mg, 0 5 mg, Nebulization, Q6H, Jacob Cosby MD, 0 5 mg at 11/25/22 2017  •  isosorbide mononitrate (IMDUR) 24 hr tablet 30 mg, 30 mg, Oral, Daily, Jacob Cosby MD, 30 mg at 11/23/22 6251  •  levalbuterol [facility]) inhalation solution 1 25 mg, 1 25 mg, Nebulization, Q6H, Jacob Cosby MD, 1 25 mg at 11/25/22 2017  •  [facility]ron [facility]) injection 4 mg, 4 mg, Intravenous, Q6H PRN, Jacob Cosby MD  •  pantoprazole (PROTONIX) EC tablet 40 mg, 40 mg, Oral, BID AC, Jacob Cosby MD, 40 mg at 11/26/22 0577  •  pravastatin (PRAVACHOL) tablet 80 mg, 80 mg, Oral, Daily With Joe Page MD, 80 mg at 11/25/22 1552  •  senna-docusate sodium (SENOKOT S) 8 6-50 mg per tablet 1 tablet, 1 tablet, Oral, HS, Jacob Cosby MD, 1 tablet at 11/25/22 2127  •  tamsulosin (FLOMAX) capsule 0 4 mg, 0 4 mg, Oral, Daily With Dinner, Jacob Cosby MD, 0 4 mg at 11/25/22 1552    Invasive Devices:   Urethral Catheter Straight-tip 18 Fr   (Active)   Amt returned on insertion(mL) 300 mL 11/20/22 1309   Reasons to continue Urinary Catheter  Acute urinary retention/obstruction failing urinary retention protocol 11/24/22 0901   Goal for Removal Voiding trial when ambulation improves 11/24/22 0901   Site Assessment Clean;Skin intact 11/24/22 0901   Cook Care Done 11/24/22 0900   Collection Container Standard drainage bag 11/24/22 0901   Output (mL) 700 mL 11/24/22 1101       Lab Results:   Results from last 7 days   Lab Units 11/26/22  0504 11/25/22  0809 11/25/22  0449 11/24/22  2034 11/24/22  1054 11/24/22  0607 11/22/22  0521 11/21/22  0636 11/20/22  2241 11/20/22  1102   WBC Thousand/uL  --  6 55 6 28  --   --  8 00   < > 17 90*  --  17 88*   HEMOGLOBIN g/dL  --  6 7* 7 1*  --   --  7 7*   < > 7 5*  --  8 7*   HEMATOCRIT %  --  21 6* 23 7*  --   --  27 9*   < > 26 0*  --  29 6*   PLATELETS Thousands/uL  --  135* 135*  --   --  103*   < > 153  --  183   SODIUM mmol/L 142  --  140 140  --  134*   < > 138  --  137   POTASSIUM mmol/L 4 3  --  4 6 4 8   < > 5 5*   < > 4 0  --  4 1   CHLORIDE mmol/L 104  --  105 103  --  105   < > 101  --  102   CO2 mmol/L 32  --  35* 34*  --  21   < > 33*  --  29   BUN mg/dL 58*  --  59* 58*  --  61*   < > 38*  --  31*   CREATININE mg/dL 2 58*  --  2 84* 3 04*  --  3 24*   < > 2 32*  --  2 01*   CALCIUM mg/dL 8 3  --  8 5 8 7  --  8 3   < > 8 3  --  8 4   MAGNESIUM mg/dL 2 5  --  2 5 2 6  --  3 0*   < > 2 3  --   --    PHOSPHORUS mg/dL 3 5  --  3 9 4 4*  --  5 0*   < > 4 3*   < >  --    ALK PHOS U/L  --   --  59  --   --   --   --  69  --  84   ALT U/L  --   --  7*  --   --   --   --  10*  --  12   AST U/L  --   --  6  --   --   --   --  9  --  7    < > = values in this interval not displayed  Previous work up:  See previous notes      Portions of the record may have been created with voice recognition software  Occasional wrong word or "sound a like" substitutions may have occurred due to the inherent limitations of voice recognition software  Read the chart carefully and recognize, using context, where substitutions have occurred  If you have any questions, please contact the dictating provider

## 2022-11-26 NOTE — PROGRESS NOTES
Respiratory Therapist attempted to place pt on bipap this evening - pt adamantly refused and took it off right away  RN bedside to encourage and educate but pt was still unwilling  Pt returned to midflow nasal cannula

## 2022-11-27 LAB
ALBUMIN SERPL BCP-MCNC: 2.2 G/DL (ref 3.5–5)
ALP SERPL-CCNC: 67 U/L (ref 46–116)
ALT SERPL W P-5'-P-CCNC: 11 U/L (ref 12–78)
ANION GAP SERPL CALCULATED.3IONS-SCNC: 5 MMOL/L (ref 4–13)
AST SERPL W P-5'-P-CCNC: 48 U/L (ref 5–45)
BASOPHILS # BLD AUTO: 0.03 THOUSANDS/ÂΜL (ref 0–0.1)
BASOPHILS NFR BLD AUTO: 0 % (ref 0–1)
BILIRUB SERPL-MCNC: 0.46 MG/DL (ref 0.2–1)
BUN SERPL-MCNC: 54 MG/DL (ref 5–25)
CALCIUM ALBUM COR SERPL-MCNC: 9.8 MG/DL (ref 8.3–10.1)
CALCIUM SERPL-MCNC: 8.4 MG/DL (ref 8.3–10.1)
CHLORIDE SERPL-SCNC: 99 MMOL/L (ref 96–108)
CO2 SERPL-SCNC: 31 MMOL/L (ref 21–32)
CREAT SERPL-MCNC: 2.41 MG/DL (ref 0.6–1.3)
EOSINOPHIL # BLD AUTO: 0.11 THOUSAND/ÂΜL (ref 0–0.61)
EOSINOPHIL NFR BLD AUTO: 2 % (ref 0–6)
ERYTHROCYTE [DISTWIDTH] IN BLOOD BY AUTOMATED COUNT: 14.9 % (ref 11.6–15.1)
GFR SERPL CREATININE-BSD FRML MDRD: 25 ML/MIN/1.73SQ M
GLUCOSE SERPL-MCNC: 155 MG/DL (ref 65–140)
GLUCOSE SERPL-MCNC: 175 MG/DL (ref 65–140)
GLUCOSE SERPL-MCNC: 185 MG/DL (ref 65–140)
GLUCOSE SERPL-MCNC: 215 MG/DL (ref 65–140)
GLUCOSE SERPL-MCNC: 229 MG/DL (ref 65–140)
HCT VFR BLD AUTO: 26.2 % (ref 36.5–49.3)
HGB BLD-MCNC: 7.8 G/DL (ref 12–17)
IMM GRANULOCYTES # BLD AUTO: 0.08 THOUSAND/UL (ref 0–0.2)
IMM GRANULOCYTES NFR BLD AUTO: 1 % (ref 0–2)
LYMPHOCYTES # BLD AUTO: 0.88 THOUSANDS/ÂΜL (ref 0.6–4.47)
LYMPHOCYTES NFR BLD AUTO: 13 % (ref 14–44)
MAGNESIUM SERPL-MCNC: 2.5 MG/DL (ref 1.6–2.6)
MCH RBC QN AUTO: 25.7 PG (ref 26.8–34.3)
MCHC RBC AUTO-ENTMCNC: 29.8 G/DL (ref 31.4–37.4)
MCV RBC AUTO: 86 FL (ref 82–98)
MONOCYTES # BLD AUTO: 0.78 THOUSAND/ÂΜL (ref 0.17–1.22)
MONOCYTES NFR BLD AUTO: 12 % (ref 4–12)
NEUTROPHILS # BLD AUTO: 4.93 THOUSANDS/ÂΜL (ref 1.85–7.62)
NEUTS SEG NFR BLD AUTO: 72 % (ref 43–75)
NRBC BLD AUTO-RTO: 0 /100 WBCS
PHOSPHATE SERPL-MCNC: 3.7 MG/DL (ref 2.3–4.1)
PLATELET # BLD AUTO: 141 THOUSANDS/UL (ref 149–390)
PMV BLD AUTO: 11.3 FL (ref 8.9–12.7)
POTASSIUM SERPL-SCNC: 4.5 MMOL/L (ref 3.5–5.3)
POTASSIUM SERPL-SCNC: 5.7 MMOL/L (ref 3.5–5.3)
PROT SERPL-MCNC: 6.9 G/DL (ref 6.4–8.4)
RBC # BLD AUTO: 3.04 MILLION/UL (ref 3.88–5.62)
SODIUM SERPL-SCNC: 135 MMOL/L (ref 135–147)
WBC # BLD AUTO: 6.81 THOUSAND/UL (ref 4.31–10.16)

## 2022-11-27 RX ORDER — INSULIN GLARGINE 100 [IU]/ML
30 INJECTION, SOLUTION SUBCUTANEOUS
Status: DISCONTINUED | OUTPATIENT
Start: 2022-11-27 | End: 2022-11-30

## 2022-11-27 RX ORDER — QUETIAPINE FUMARATE 25 MG/1
12.5 TABLET, FILM COATED ORAL
Status: DISCONTINUED | OUTPATIENT
Start: 2022-11-27 | End: 2022-12-02 | Stop reason: HOSPADM

## 2022-11-27 RX ORDER — INSULIN GLARGINE 100 [IU]/ML
35 INJECTION, SOLUTION SUBCUTANEOUS
Status: DISCONTINUED | OUTPATIENT
Start: 2022-11-27 | End: 2022-11-27

## 2022-11-27 RX ORDER — CALCIUM GLUCONATE 20 MG/ML
1 INJECTION, SOLUTION INTRAVENOUS ONCE
Status: COMPLETED | OUTPATIENT
Start: 2022-11-27 | End: 2022-11-27

## 2022-11-27 RX ORDER — INSULIN LISPRO 100 [IU]/ML
5 INJECTION, SOLUTION INTRAVENOUS; SUBCUTANEOUS
Status: DISCONTINUED | OUTPATIENT
Start: 2022-11-27 | End: 2022-12-02 | Stop reason: HOSPADM

## 2022-11-27 RX ORDER — INSULIN LISPRO 100 [IU]/ML
1-6 INJECTION, SOLUTION INTRAVENOUS; SUBCUTANEOUS
Status: DISCONTINUED | OUTPATIENT
Start: 2022-11-27 | End: 2022-12-02 | Stop reason: HOSPADM

## 2022-11-27 RX ADMIN — BUDESONIDE 0.5 MG: 0.5 INHALANT ORAL at 08:04

## 2022-11-27 RX ADMIN — PRAVASTATIN SODIUM 80 MG: 80 TABLET ORAL at 17:18

## 2022-11-27 RX ADMIN — INSULIN LISPRO 2 UNITS: 100 INJECTION, SOLUTION INTRAVENOUS; SUBCUTANEOUS at 17:19

## 2022-11-27 RX ADMIN — PANTOPRAZOLE SODIUM 40 MG: 40 TABLET, DELAYED RELEASE ORAL at 17:17

## 2022-11-27 RX ADMIN — SENNOSIDES AND DOCUSATE SODIUM 1 TABLET: 8.6; 5 TABLET ORAL at 22:34

## 2022-11-27 RX ADMIN — IPRATROPIUM BROMIDE 0.5 MG: 0.5 SOLUTION RESPIRATORY (INHALATION) at 13:47

## 2022-11-27 RX ADMIN — QUETIAPINE FUMARATE 12.5 MG: 25 TABLET ORAL at 22:34

## 2022-11-27 RX ADMIN — IPRATROPIUM BROMIDE 0.5 MG: 0.5 SOLUTION RESPIRATORY (INHALATION) at 02:36

## 2022-11-27 RX ADMIN — PANTOPRAZOLE SODIUM 40 MG: 40 TABLET, DELAYED RELEASE ORAL at 07:19

## 2022-11-27 RX ADMIN — GABAPENTIN 100 MG: 100 CAPSULE ORAL at 17:18

## 2022-11-27 RX ADMIN — APIXABAN 5 MG: 5 TABLET, FILM COATED ORAL at 17:17

## 2022-11-27 RX ADMIN — BUMETANIDE 1 MG: 0.25 INJECTION, SOLUTION INTRAMUSCULAR; INTRAVENOUS at 17:22

## 2022-11-27 RX ADMIN — BUMETANIDE 1 MG: 0.25 INJECTION, SOLUTION INTRAMUSCULAR; INTRAVENOUS at 08:37

## 2022-11-27 RX ADMIN — CEFTAZIDIME 2000 MG: 1 INJECTION, POWDER, FOR SOLUTION INTRAMUSCULAR; INTRAVENOUS at 12:35

## 2022-11-27 RX ADMIN — LEVALBUTEROL HYDROCHLORIDE 1.25 MG: 1.25 SOLUTION, CONCENTRATE RESPIRATORY (INHALATION) at 13:47

## 2022-11-27 RX ADMIN — CARVEDILOL 6.25 MG: 6.25 TABLET, FILM COATED ORAL at 17:18

## 2022-11-27 RX ADMIN — BUDESONIDE 0.5 MG: 0.5 INHALANT ORAL at 19:17

## 2022-11-27 RX ADMIN — GABAPENTIN 100 MG: 100 CAPSULE ORAL at 08:36

## 2022-11-27 RX ADMIN — CALCIUM GLUCONATE 1 G: 20 INJECTION, SOLUTION INTRAVENOUS at 06:51

## 2022-11-27 RX ADMIN — IPRATROPIUM BROMIDE 0.5 MG: 0.5 SOLUTION RESPIRATORY (INHALATION) at 08:04

## 2022-11-27 RX ADMIN — TAMSULOSIN HYDROCHLORIDE 0.4 MG: 0.4 CAPSULE ORAL at 17:19

## 2022-11-27 RX ADMIN — FORMOTEROL FUMARATE DIHYDRATE 20 MCG: 20 SOLUTION RESPIRATORY (INHALATION) at 19:17

## 2022-11-27 RX ADMIN — INSULIN LISPRO 3 UNITS: 100 INJECTION, SOLUTION INTRAVENOUS; SUBCUTANEOUS at 00:31

## 2022-11-27 RX ADMIN — APIXABAN 5 MG: 5 TABLET, FILM COATED ORAL at 08:36

## 2022-11-27 RX ADMIN — INSULIN LISPRO 5 UNITS: 100 INJECTION, SOLUTION INTRAVENOUS; SUBCUTANEOUS at 17:19

## 2022-11-27 RX ADMIN — INSULIN GLARGINE 30 UNITS: 100 INJECTION, SOLUTION SUBCUTANEOUS at 22:43

## 2022-11-27 RX ADMIN — GABAPENTIN 100 MG: 100 CAPSULE ORAL at 22:34

## 2022-11-27 RX ADMIN — LEVALBUTEROL HYDROCHLORIDE 1.25 MG: 1.25 SOLUTION, CONCENTRATE RESPIRATORY (INHALATION) at 08:04

## 2022-11-27 RX ADMIN — INSULIN LISPRO 5 UNITS: 100 INJECTION, SOLUTION INTRAVENOUS; SUBCUTANEOUS at 12:30

## 2022-11-27 RX ADMIN — INSULIN LISPRO 1 UNITS: 100 INJECTION, SOLUTION INTRAVENOUS; SUBCUTANEOUS at 07:14

## 2022-11-27 RX ADMIN — LEVALBUTEROL HYDROCHLORIDE 1.25 MG: 1.25 SOLUTION, CONCENTRATE RESPIRATORY (INHALATION) at 02:36

## 2022-11-27 RX ADMIN — IPRATROPIUM BROMIDE 0.5 MG: 0.5 SOLUTION RESPIRATORY (INHALATION) at 19:17

## 2022-11-27 RX ADMIN — ISOSORBIDE MONONITRATE 30 MG: 30 TABLET, EXTENDED RELEASE ORAL at 08:36

## 2022-11-27 RX ADMIN — INSULIN LISPRO 1 UNITS: 100 INJECTION, SOLUTION INTRAVENOUS; SUBCUTANEOUS at 12:30

## 2022-11-27 RX ADMIN — ASPIRIN 81 MG: 81 TABLET, COATED ORAL at 08:36

## 2022-11-27 RX ADMIN — FLUTICASONE PROPIONATE 1 PUFF: 110 AEROSOL, METERED RESPIRATORY (INHALATION) at 08:37

## 2022-11-27 RX ADMIN — FORMOTEROL FUMARATE DIHYDRATE 20 MCG: 20 SOLUTION RESPIRATORY (INHALATION) at 08:04

## 2022-11-27 RX ADMIN — CARVEDILOL 6.25 MG: 6.25 TABLET, FILM COATED ORAL at 07:19

## 2022-11-27 RX ADMIN — LEVALBUTEROL HYDROCHLORIDE 1.25 MG: 1.25 SOLUTION, CONCENTRATE RESPIRATORY (INHALATION) at 19:17

## 2022-11-27 NOTE — PROGRESS NOTES
Daily Progress Note - Critical Care   Hay Schaffer Sr  78 y o  male MRN: [id number]  Unit/Bed#: [facility] 201-01 Encounter: [id number]        ----------------------------------------------------------------------------------------  HPI/24hr events:  Rekha is a 51-year-old male with a past medical history of severe COPD, diastolic CHF, atrial fibrillation on Eliquis, lung cancer status post SBRT, type 2 diabetes, stage 3b CKD, GERD, CAD and hypertension who presented to the emergency department 11/20 complaining of worsening shortness of breath   His worsening hypoxic respiratory failure was thought to be secondary to acute CHF exacerbation versus COPD exacerbation versus possible underlying pneumonia   He was placed on high-flow nasal cannula in the emergency department as well as a nitro drip for elevated blood pressure and Lasix   The patient met SIRS criteria and was started on antibiotics and blood cultures were drawn  [facility] was also found to have a new near complete occlusion of the middle lobe on the right lower lobe bronchial branches with atelectasis on CT chest   He remained on high-flow nasal cannula and so he was brought to the ICCU for further monitoring  The ICCU the patient was placed on BiPAP due to worsening hypoxic and hypercapnic respiratory failure   Blood and urine cultures were positive for Pseudomonas and he was continued on cefepime and vancomycin was discontinued   The patient's clinical status slowly improved and he was eventually weaned off of BiPAP and down to 3 L nasal cannula   He was later deemed medically stable for downgrade to med surge      On 11/24 critical care team was alerted to assess patient  Per review of notes from day patient was instructed to be compliant with BiPAP on previous night  Per nursing, patient was not compliant overnight he was found to be increasingly lethargic and the morning of 11/24    Initial ABG showed the patient was in respiratory acidosis with a pH of 7 2-9  Patient had elevated pCO2 of 83 5  Respiratory therapy was contacted and patient was subsequently placed back on BiPAP  Follow-up ABG few hours after initiating BiPAP revealed a pH improved but still acidotic at 7 3  Patient had slightly improved pCO2 is 73 5 at that time as well  Patient had repeat chest x-ray today which is concerning for a possible new left basilar pneumonia, the patient already on antibiotic therapy  After evaluation of critical care team, patient is appropriate for step-down level 1  Patient was acutely obtunded  Patient responded to sternal rub and at that point was able to follow basic commands such as squeezing hands  Perfect patient had not taken any narcotics today, blood sugars acceptable and without concern for hypoglycemia  No metabolic derangements  Updated level of care, will continue BiPAP for patient overnight  Stat head CT pending as patient with acute change in mental status and on Eliquis  11/25 patient remained on BiPAP overnight  11/26 refused BiPAP overnight  11/27 tolerated BiPAP overnight  ---------------------------------------------------------------------------------------  SUBJECTIVE  The patient was seen and examined at bedside  He had no acute events overnight  He tolerated BiPAP overnight  This morning he was resting comfortably with no acute complaints  Review of Systems   Constitutional: Negative for activity change, appetite change, chills, fatigue and fever  HENT: Negative for congestion, ear discharge, ear pain, hearing loss, sinus pain, sore throat, tinnitus and trouble swallowing  Eyes: Negative for pain and visual disturbance  Respiratory: Negative for cough, chest tightness, shortness of breath and wheezing  Cardiovascular: Negative for chest pain and leg swelling     Gastrointestinal: Negative for abdominal distention, abdominal pain, anal bleeding, blood in stool, constipation, diarrhea, nausea, rectal pain and vomiting  Endocrine: Negative for cold intolerance and heat intolerance  Genitourinary: Negative for difficulty urinating, dysuria and frequency  Musculoskeletal: Negative for arthralgias and myalgias  Skin: Negative for rash  Allergic/Immunologic: Negative for environmental allergies and food allergies  Neurological: Negative for dizziness, light-headedness, numbness and headaches  Hematological: Negative for adenopathy  Psychiatric/Behavioral: Negative for agitation, behavioral problems, confusion and decreased concentration  Lethargic       ---------------------------------------------------------------------------------------  Assessment and Plan:    Neuro:   • Diagnosis:  Toxic metabolic encephalopathy  ? Likely secondary to acute hypoxic, hypercarbic respiratory failure with possible contraction alkalosis  ? Continue BiPAP mask QHS and PRN  ? Monitor ABG   ? Continue delirium precautions  ? CT head negative  ? Cefepime transitioned to ceftazidime in setting of persistent AMS  ? 11/24 patient obtunded, noncompliant with BiPAP overnight     ? 11/25 patient compliant with BiPAP in slowly improving  ? 11/26 patient again refused BiPAP  ? Downgraded to step-down unit  ? Will decrease Seroquel q h s  25 mg to 12 5 mg given persistent lethargy  ? Will consider neuropsych evaluation     CV:   • Diagnosis:  AFib  ? Plan: Continue home Eliquis and carvedilol  ? Continue telemetry monitoring  • Diagnosis:  CAD  ? Plan:  Status post stent placement in 2015  ? Stress test negative in May of this year  ? Continue aspirin, statin, beta-blocker  • Diagnosis:  HTN  ? Plan:  Monitor blood pressure closely  ? Continue carvedilol  ? Continue Bumex  • Diagnosis:  HLD  ? Plan:  Continue statin  • Diagnosis:  Acute on chronic diastolic heart failure  ? Plan:  Continue Bumex  ? Continue fluid restriction and sodium restriction  ?  Monitor I's and O's and daily weights        Pulm:  • Diagnosis: Pneumonia  ? Plan: CXR showed some concern for pneumonia  ? CT chest earlier in admission showed increase in right pleural effusion with new small left effusion, new near complete occlusion of right middle lobe and right lower lobe bronchial branches with atelectasis and new small perihepatic ascites  ? F/u a m CXR to assess pleural effusion  ? Follow-up blood culture, MRSA culture and urine culture  - Blood and urine culture positive for Pseudomonas   - Cefepime transitioned to ceftazidime in setting of persistent AMS  - Continue ceftazidime 2 g Q 24  If creatinine clearance rises above 30 will change dosing to q 12  ? Monitor WBC count and fever curve  • Diagnosis:  Lung adenocarcinoma  ? Plan: s/p resection and radiation therapy in the past  • Diagnosis:  Right-sided pleural effusion  • Plan: s/p IR thoracentesis 11/23  • Diagnosis:  COPD  ? Plan:  History of COPD on 2 L NC at baseline  ? Continue nebulizers with Xopenex and Atrovent  - Added Perforomist and budesonide b i d   ? Continue to wean supplemental oxygen as able  • Diagnosis:  Acute on chronic respiratory failure with hypoxia and hypercapnia  ? Plan:  Likely secondary to pneumonia versus CHF exacerbation versus COPD exacerbation  ? Continue plan for pneumonia as above  ? Monitor ABG  ? Continue BIPAP as able, though patient persistently noncompliant overnight  ? Continue Bumex  ? Monitor I's and O's and daily weights  ? Wean supplemental oxygen as able -> currently at baseline on 2 L  ? Monitor respiratory status closely        GI:   • Diagnosis:   no active issues  • continue Protonix b i d         :   • Diagnosis:  Stage IIIB CKD  ? Plan:  Baseline creatinine appears to be from 1 8-2  ? On admission creatinine was at baseline at 2 01  ? Monitor BMP closely  ? Avoid nephrotoxic medications  ? Continue Bumex  ?  Nephrology following        F/E/N:   • Plan:   • F; not on fluids  • E: Monitor daily BMP  • N:  Dysphagia 2 diet        Heme/Onc: • Diagnosis:  Lung adenocarcinoma  ? Plan: s/p resection and radiation therapy in the past        Endo:   • Diagnosis:  Type 2 diabetes mellitus  ? Plan:  Home insulin regimen includes 30 units of long-acting at bedtime and 10 units short-acting with meals  ? Continue insulin Lantus 30 units q h s  with SSI coverage  ? Will add 5 units mealtime insulin t i d  with meals on 11/27 given high blood sugar readings   ? Gabapentin for neuropathy  ? Monitor blood sugars closely  ? Avoid hypoglycemia        ID:   • Diagnosis:  Sepsis  ? Plan:  Patient presented with leukocytosis and tachypnea  ? Sources likely secondary to UTI vs pneumonia  - Blood and urine cultures growing Pseudomonas  ? Cefepime transitioned to ceftazidime given persistent AMS -> continue ceftazidime  ? Monitor WBC count and fever curve  ? Case management following regarding possible need for long-term placement -> patient reportedly has a history of hoarding behaviors         MSK/Skin:   ? Diagnosis:  PT/OT consulted  - OOB when able  - Frequent turning and repositioning      Patient appropriate for transfer out of the ICU today?: Yes  Disposition: Transfer to Stepdown Level 1   Code Status: Level 1 - Full Code  ---------------------------------------------------------------------------------------  ICU CORE MEASURES    Prophylaxis   VTE Pharmacologic Prophylaxis: Heparin Drip  VTE Mechanical Prophylaxis: sequential compression device  Stress Ulcer Prophylaxis: Pantoprazole PO      Invasive Devices Review  Invasive Devices     Peripheral Intravenous Line  Duration           Peripheral IV 11/24/22 Left Antecubital 3 days    Peripheral IV 11/24/22 Distal;Right;Ventral (anterior) Forearm 2 days    Peripheral IV 11/24/22 Proximal;Right;Ventral (anterior) Forearm 2 days          Drain  Duration           External Urinary Catheter Small <1 day              Can any invasive devices be discontinued today?  Not applicable  ---------------------------------------------------------------------------------------  OBJECTIVE    Vitals   Vitals:    22 0600 22 0702 22 0724 22 1349   BP:  149/65     BP Location:       Pulse:  76 72    Resp:  18     Temp:  98 6 °F (37 °C)     TempSrc:  Oral     SpO2:  100%  96%   Weight: 98 5 kg (217 lb 3 2 oz)      Height:         Temp (24hrs), Av 2 °F (36 8 °C), Min:97 7 °F (36 5 °C), Max:98 6 °F (37 °C)  Current: Temperature: 98 6 °F (37 °C)      Respiratory:  SpO2: SpO2: 96 %  Nasal Cannula O2 Flow Rate (L/min): 2 L/min    Invasive/non-invasive ventilation settings   Respiratory    Lab Data (Last 4 hours)    None         O2/Vent Data (Last 4 hours)    None                Physical Exam  Constitutional:       Appearance: He is well-developed  He is obese  HENT:      Head: Normocephalic and atraumatic  Nose: Nose normal    Eyes:      General:         Right eye: No discharge  Left eye: No discharge  Conjunctiva/sclera: Conjunctivae normal       Pupils: Pupils are equal, round, and reactive to light  Neck:      Thyroid: No thyromegaly  Cardiovascular:      Rate and Rhythm: Normal rate  Rhythm irregular  Heart sounds: Normal heart sounds  No murmur heard  No friction rub  No gallop  Pulmonary:      Effort: No respiratory distress  Breath sounds: Normal breath sounds  No stridor  No wheezing or rales  Comments: 2L NC  Chest:      Chest wall: No tenderness  Abdominal:      General: Bowel sounds are normal  There is no distension  Palpations: Abdomen is soft  There is no mass  Tenderness: There is no abdominal tenderness  There is no guarding or rebound  Musculoskeletal:         General: No tenderness or deformity  Right lower leg: No edema  Left lower leg: No edema  Lymphadenopathy:      Cervical: No cervical adenopathy  Skin:     General: Skin is warm and dry     Neurological:      Mental Status: He is alert               Laboratory and Diagnostics:  Results from last 7 days   Lab Units 11/27/22  0518 11/26/22  0504 11/25/22  0809 11/25/22  0449 11/24/22  0607 11/23/22  0451 11/22/22  0521 11/21/22  0636   WBC Thousand/uL 6 81 6 06 6 55 6 28 8 00 10 58* 12 99* 17 90*   HEMOGLOBIN g/dL 7 8* 7 4* 6 7* 7 1* 7 7* 7 8* 7 1* 7 5*   HEMATOCRIT % 26 2* 26 0* 21 6* 23 7* 27 9* 26 5* 24 8* 26 0*   PLATELETS Thousands/uL 141* 136* 135* 135* 103* 143* 132* 153   NEUTROS PCT % 72 75  --  81* 79*  --   --  89*   MONOS PCT % 12 11  --  8 11  --   --  7     Results from last 7 days   Lab Units 11/27/22  0834 11/27/22  0518 11/26/22  0504 11/25/22  0449 11/24/22  2034 11/24/22  1054 11/24/22  0607 11/23/22  0451 11/22/22  0521 11/21/22  0636   SODIUM mmol/L  --  135 142 140 140  --  134* 137 137 138   POTASSIUM mmol/L 4 5 5 7* 4 3 4 6 4 8 4 7 5 5* 4 5 4 2 4 0   CHLORIDE mmol/L  --  99 104 105 103  --  105 101 102 101   CO2 mmol/L  --  31 32 35* 34*  --  21 30 31 33*   ANION GAP mmol/L  --  5 6 0* 3*  --  8 6 4 4   BUN mg/dL  --  54* 58* 59* 58*  --  61* 60* 46* 38*   CREATININE mg/dL  --  2 41* 2 58* 2 84* 3 04*  --  3 24* 3 26* 2 72* 2 32*   CALCIUM mg/dL  --  8 4 8 3 8 5 8 7  --  8 3 8 4 8 3 8 3   GLUCOSE RANDOM mg/dL  --  185* 85 143* 140  --  196* 250* 258* 257*   ALT U/L  --  11*  --  7*  --   --   --   --   --  10*   AST U/L  --  48*  --  6  --   --   --   --   --  9   ALK PHOS U/L  --  67  --  59  --   --   --   --   --  69   ALBUMIN g/dL  --  2 2*  --  2 1*  --   --   --   --   --  2 2*   TOTAL BILIRUBIN mg/dL  --  0 46  --  0 34  --   --   --   --   --  0 42     Results from last 7 days   Lab Units 11/27/22  0518 11/26/22  0504 11/25/22  0449 11/24/22  2034 11/24/22  0607 11/22/22  0521 11/21/22  0636   MAGNESIUM mg/dL 2 5 2 5 2 5 2 6 3 0* 2 5 2 3   PHOSPHORUS mg/dL 3 7 3 5 3 9 4 4* 5 0* 3 8 4 3*      Results from last 7 days   Lab Units 11/25/22  1414 11/25/22  0809   INR   --  1 19   PTT seconds 40* 33          Results from last 7 days   Lab Units 11/20/22  2243   LACTIC ACID mmol/L 1 5     ABG:  Results from last 7 days   Lab Units 11/25/22  0039   PH ART  7 369   PCO2 ART mm Hg 62 0*   PO2 ART mm Hg 124 1   HCO3 ART mmol/L 35 0*   BASE EXC ART mmol/L 8 5   ABG SOURCE  Radial, Left     VBG:  Results from last 7 days   Lab Units 11/26/22  0504 11/25/22  0039   PH MAYE  7 536*  --    PCO2 MAYE mm Hg 35 7*  --    PO2 MAYE mm Hg 50 9*  --    HCO3 MAYE mmol/L 29 6  --    BASE EXC MAYE mmol/L 6 6  --    ABG SOURCE   --  Radial, Left     Results from last 7 days   Lab Units 11/25/22  0449 11/23/22  0451 11/21/22  0636   PROCALCITONIN ng/ml 0 64* 1 37* 2 67*       Micro  Results from last 7 days   Lab Units 11/23/22  1804 11/22/22  1700   BLOOD CULTURE   --  No Growth After 4 Days  No Growth After 4 Days  GRAM STAIN RESULT  1+ Polys  --    BODY FLUID CULTURE, STERILE  No growth  --        Imaging: I have personally reviewed pertinent reports  Intake and Output  I/O       11/23 0701  11/24 0700 11/24 0701  11/25 0700 11/25 0701  11/26 0700    P  O  180 0     I V  (mL/kg)  30 (0 3) 55 2 (0 5)    IV Piggyback 150 100     Total Intake(mL/kg) 330 (3) 130 (1 3) 55 2 (0 5)    Urine (mL/kg/hr) 1325 (0 5) 2875 (1 2) 950 (1 1)    Other 900      Total Output 2225 2875 950    Net -1895 -2745 -894 8                 Height and Weights   Height: 6' (182 9 cm)  IBW (Ideal Body Weight): 77 6 kg  Body mass index is 29 46 kg/m²  Weight (last 2 days)     Date/Time Weight    11/27/22 0600 98 5 (217 2)    11/26/22 0600 104 (229 4)    11/25/22 0551 102 (225 31)            Nutrition       Diet Orders   (From admission, onward)             Start     Ordered    11/25/22 1535  Diet Dysphagia/Modified Consistency; Dysphagia 2-Mechanical Soft;  Thin Liquid  Diet effective now        References:    Nutrtion Support Algorithm Enteral vs  Parenteral   Question Answer Comment   Diet Type Dysphagia/Modified Consistency    Dysphagia/Modified Consistency Dysphagia 2-Mechanical Soft    Liquid Modifier Thin Liquid    RD to adjust diet per protocol?  Yes        11/25/22 1535                  Active Medications  Scheduled Meds:  Current Facility-Administered Medications   Medication Dose Route Frequency Provider Last Rate   • apixaban  5 mg Oral BID Will MD Sang     • aspirin  81 mg Oral Daily Will MD Sang     • budesonide  0 5 mg Nebulization Q12H Will MD Sang     • bumetanide  1 mg Intravenous BID Arlene Gutierrez MD     • carvedilol  6 25 mg Oral BID With Meals Will MD Sang     • cefTAZidime  2,000 mg Intravenous Q24H Naomie Alexandre MD 2,000 mg (11/27/22 1235)   • fluticasone  1 puff Inhalation Q12H [address] Will MD Sang     • formoterol  20 mcg Nebulization Q12H Will MD Sang     • gabapentin  100 mg Oral TID Will MD Sang     • insulin glargine  30 Units Subcutaneous HS Will MD Sang     • insulin lispro  1-6 Units Subcutaneous TID AC Rao Mckenzie DO     • insulin lispro  5 Units Subcutaneous TID With Meals Rao Mckenzie DO     • ipratropium  0 5 mg Nebulization Q6H Will MD Sang     • isosorbide mononitrate  30 mg Oral Daily Will MD Sang     • levalbuterol  1 25 mg Nebulization Q6H Will MD Sang     • ondansetron  4 mg Intravenous Q6H PRN Will MD Sang     • pantoprazole  40 mg Oral BID AC Will MD Sang     • pravastatin  80 mg Oral Daily With Phylicia Salamanca MD     • QUEtiapine  12 5 mg Oral HS Will MD Sang     • senna-docusate sodium  1 tablet Oral HS Will MD Sang     • tamsulosin  0 4 mg Oral Daily With Phylicia Salamanca MD       Continuous Infusions:     PRN Meds:   ondansetron, 4 mg, Q6H PRN        Allergies   Allergies   Allergen Reactions   • Crestor [Rosuvastatin] Other (See Comments)     Unknown     • Lisinopril GI Intolerance, Dizziness and Abdominal Pain   • Metformin GI Intolerance     ---------------------------------------------------------------------------------------  Advance Directive and Living Will:      Power of :    POLST:    ---------------------------------------------------------------------------------------  Care Time Delivered:   Upon my evaluation, this patient had a high probability of imminent or life-threatening deterioration due to Acute respiratory failure, which required my direct attention, intervention, and personal management  I have personally provided 60 minutes of critical care time, exclusive of procedures, teaching, family meetings, and any prior time recorded by providers other than myself  Jacob Cosby MD      Portions of the record may have been created with voice recognition software  Occasional wrong word or "sound a like" substitutions may have occurred due to the inherent limitations of voice recognition software    Read the chart carefully and recognize, using context, where substitutions have occurred

## 2022-11-27 NOTE — PROGRESS NOTES
NEPHROLOGY PROGRESS NOTE   Dustin Earl  78 y o  male MRN: [id number]  Unit/Bed#: [facility] 201-01 Encounter: [id number]      ASSESSMENT & PLAN:  1  Acute kidney injury on top of CKD stage 4 with baseline creatinine reported around 2 5-2 7  Admitted with a creatinine of 2 0, creatinine peak at 3 26 on 11/23  Kidney function continues to improve with creatinine down to 2 41 that is his baseline  Continue with diuretics to achieve euvolemia  Avoid relative hypotension  Monitor ins and outs and follow daily labs  Noted mild hyperkalemia but slightly hemolyzed, continue with diuresis, follow low-potassium diet    2  Chronic kidney disease stage 4 with reported baseline creatinine around 2 5-2 7, recent episode of MARANDA  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 as well as ATN  3  Acute on chronic diastolic congestive heart failure, Volume overload, continue with diuretics to achieve euvolemia  Follow daily weight  Monitor ins and outs    4  Hemodynamics, blood pressure stable, avoid relative hypotension    5  Anemia, suspected component of chronic kidney disease, hemoglobin low but stable in the high sevens, monitor H&H and transfusion for hemoglobin less than 7    6  Sepsis, pneumonia, antibiotics per primary team     7  Acute on chronic hypoxic hypercapnic respiratory failure, continue with BiPAP as per primary team    8  Encephalopathy, secondary to hypercapnia, improved after BiPAP  Management per critical care team    Plan and recommendation were discussed with critical care Medicine team      SUBJECTIVE:  Patient seen and examined, more somnolent and less arousable this morning  Reported by nurse he was wearing BiPAP overnight    This morning more somnolent, suspected secondary to Seroquel he received last night      OBJECTIVE:  Current Weight: Weight - Scale: 98 5 kg (217 lb 3 2 oz)  Vitals:    11/27/22 0724   BP:    Pulse: 72   Resp:    Temp:    SpO2:        Intake/Output Summary (Last 24 hours) at 11/27/2022 4535  Last data filed at 11/27/2022 1147  Gross per 24 hour   Intake 1200 ml   Output 3575 ml   Net -2375 ml     General:  Chronically ill, more somnolent and less arousable this morning, in not acute distress  Eyes: conjunctivae pale, anicteric sclerae  ENT: lips and mucous membranes mildly dry, oxygen nasal cannula  Neck: supple, no JVD  Chest: clear breath sounds bilateral, no crackles, ronchus or wheezings  CVS: distinct S1 & S2, normal rate, regular rhythm  Abdomen:  Obese, non-tender, non-distended, normoactive bowel sounds  Extremities: + edema of both legs  Skin:  Chronic skin changes in lower extremities  Neuro:  Somnolent, less arousable this morning          Medications:    Current Facility-Administered Medications:   •  apixaban (ELIQUIS) tablet 5 mg, 5 mg, Oral, BID, Carry MD Vasile, 5 mg at 11/27/22 7667  •  aspirin (ECOTRIN LOW STRENGTH) EC tablet 81 mg, 81 mg, Oral, Daily, Carry MD Vasile, 81 mg at 11/27/22 0836  •  budesonide (PULMICORT) inhalation solution 0 5 mg, 0 5 mg, Nebulization, Q12H, Carry MD Vasile, 0 5 mg at 11/27/22 0804  •  bumetanide (BUMEX) injection 1 mg, 1 mg, Intravenous, BID, Gregory Ford MD, 1 mg at 11/27/22 1212  •  carvedilol (COREG) tablet 6 25 mg, 6 25 mg, Oral, BID With Meals, Carry MD Vasile, 6 25 mg at 11/27/22 0719  •  cefTAZidime (FORTAZ) 2,000 mg in sodium chloride 0 9 % 50 mL IVPB, 2,000 mg, Intravenous, Q24H, Stoney Felix MD, Last Rate: 100 mL/hr at 11/26/22 1247, 2,000 mg at 11/26/22 1247  •  fluticasone (FLOVENT HFA) 110 MCG/ACT inhaler 1 puff, 1 puff, Inhalation, Q12H [facility], Carry MD Vasile, 1 puff at 11/27/22 0411  •  formoterol (PERFOROMIST) nebulizer solution 20 mcg, 20 mcg, Nebulization, Q12H, Carry MD Vasile, 20 mcg at 11/27/22 0982  •  gabapentin (NEURONTIN) capsule 100 mg, 100 mg, Oral, TID, Carry MD Vasile, 100 mg at 11/27/22 0836  •  insulin glargine (LANTUS) subcutaneous injection 30 Units 0 3 mL, 30 Units, Subcutaneous, HS, Alta Aquino MD, 30 Units at 11/26/22 2130  •  insulin lispro (HumaLOG) 100 units/mL subcutaneous injection 1-6 Units, 1-6 Units, Subcutaneous, Q6H, 1 Units at 11/27/22 0714 **AND** Fingerstick Glucose (POCT), , , Q6H, Alta Aquino MD  •  ipratropium (ATROVENT) 0 02 % inhalation solution 0 5 mg, 0 5 mg, Nebulization, Q6H, Alta Aquino MD, 0 5 mg at 11/27/22 3162  •  isosorbide mononitrate (IMDUR) 24 hr tablet 30 mg, 30 mg, Oral, Daily, Alta Aquino MD, 30 mg at 11/27/22 3625  •  levalbuterol ([place]) inhalation solution 1 25 mg, 1 25 mg, Nebulization, Q6H, Alta Aquino MD, 1 25 mg at 11/27/22 0804  •  ondansetron (ZOFRAN) injection 4 mg, 4 mg, Intravenous, Q6H PRN, Alta Aquino MD  •  pantoprazole (PROTONIX) EC tablet 40 mg, 40 mg, Oral, BID AC, Alta Aquino MD, 40 mg at 11/27/22 0719  •  pravastatin (PRAVACHOL) tablet 80 mg, 80 mg, Oral, Daily With Maciej Llamas MD, 80 mg at 11/26/22 1624  •  QUEtiapine (SEROquel) tablet 25 mg, 25 mg, Oral, HS, Alta Aquino MD, 25 mg at 11/26/22 2129  •  senna-docusate sodium (SENOKOT S) 8 6-50 mg per tablet 1 tablet, 1 tablet, Oral, HS, Alta Aquino MD, 1 tablet at 11/26/22 2130  •  tamsulosin (FLOMAX) capsule 0 4 mg, 0 4 mg, Oral, Daily With Dinner, Alta Aquino MD, 0 4 mg at 11/26/22 1624    Invasive Devices:   Urethral Catheter Straight-tip 18 Fr   (Active)   Amt returned on insertion(mL) 300 mL 11/20/22 1309   Reasons to continue Urinary Catheter  Acute urinary retention/obstruction failing urinary retention protocol 11/24/22 0901   Goal for Removal Voiding trial when ambulation improves 11/24/22 0901   Site Assessment Clean;Skin intact 11/24/22 0901   Cook Care Done 11/24/22 0900   Collection Container Standard drainage bag 11/24/22 0901   Output (mL) 700 mL 11/24/22 1101       Lab Results:   Results from last 7 days   Lab Units 11/27/22  0518 11/26/22  0504 11/25/22  0809 11/25/22  0449 11/22/22  0521 11/21/22  0636 WBC Thousand/uL 6 81 6 06 6 55 6 28   < > 17 90*   HEMOGLOBIN g/dL 7 8* 7 4* 6 7* 7 1*   < > 7 5*   HEMATOCRIT % 26 2* 26 0* 21 6* 23 7*   < > 26 0*   PLATELETS Thousands/uL 141* 136* 135* 135*   < > 153   SODIUM mmol/L 135 142  --  140   < > 138   POTASSIUM mmol/L 5 7* 4 3  --  4 6   < > 4 0   CHLORIDE mmol/L 99 104  --  105   < > 101   CO2 mmol/L 31 32  --  35*   < > 33*   BUN mg/dL 54* 58*  --  59*   < > 38*   CREATININE mg/dL 2 41* 2 58*  --  2 84*   < > 2 32*   CALCIUM mg/dL 8 4 8 3  --  8 5   < > 8 3   MAGNESIUM mg/dL 2 5 2 5  --  2 5   < > 2 3   PHOSPHORUS mg/dL 3 7 3 5  --  3 9   < > 4 3*   ALK PHOS U/L 67  --   --  59  --  69   ALT U/L 11*  --   --  7*  --  10*   AST U/L 48*  --   --  6  --  9    < > = values in this interval not displayed  Previous work up:  See previous notes      Portions of the record may have been created with voice recognition software  Occasional wrong word or "sound a like" substitutions may have occurred due to the inherent limitations of voice recognition software  Read the chart carefully and recognize, using context, where substitutions have occurred  If you have any questions, please contact the dictating provider

## 2022-11-27 NOTE — CONSULTS
Consultation - Palliative and Supportive Care   Hobucken Alondra  78 y o  male [id number]    Patient Active Problem List   Diagnosis   • COPD (chronic obstructive pulmonary disease) (HCC)   • HLD (hyperlipidemia)   • Type 2 diabetes mellitus with hyperglycemia, with long-term current use of insulin (HCC)   • Venous stasis dermatitis of both lower extremities   • Pleural effusion on right   • Chronic diastolic heart failure (HCC)   • Essential hypertension   • Diabetic neuropathy (HCC)   • CAD (coronary artery disease)   • Acute metabolic encephalopathy   • RBBB   • Oxygen dependent   • COPD with acute exacerbation ([facility])   • Pulmonary hypertension ([facility])   • JACQUELINE (obstructive sleep apnea)   • Lung nodule   • DDD (degenerative disc disease), lumbar   • Anemia, iron deficiency   • PAD (peripheral artery disease) ([facility])   • Acute on chronic respiratory failure with hypoxia and hypercapnia ([facility])   • Fracture of navicular bone of left foot   • Adenocarcinoma of lung (HCC)   • History of prostate cancer   • Stage 3b chronic kidney disease (CKD) ([facility])   • Chronic left-sided low back pain without sciatica   • Epididymitis, bilateral   • Hypothermia   • Frequent hospital admissions   • A-fib [facility])   • Acute-on-chronic kidney injury ([facility] [address] )   • SIRS (systemic inflammatory response syndrome) ([facility])   • Anemia   • Shortness of breath   • Sepsis ([facility])   • Pneumonia   • Acute on chronic diastolic heart failure ([facility])     Active issues specifically addressed today include:  COPD, acute on chronic hypercapnic and hypoxic respiratory failure, goals of care    Plan:  1  Symptom management    - defer to primary team    2  Goals   · Saw patient with his sister-in-law present at bedside this morning  Patient appears groggy and slow to respond to questions but appears to be answering questions appropriately  However, he appears slightly disoriented    · Spent time discussing with patient the importance of being compliant with oxygen supplementation particularly the BiPAP  He said he does not appreciate the threat of death if he does not use it, that is why he is refusing it  With the help of his sister-in-law, we explained to him that this is not a threat but a possibility if he continues to refuse to wear the BiPAP when this is part of his care  · Sister-in-law then tells me that they had a family discussion (including patient's son) with the patient and that they have decided to continue with level 1 full code  · Recommend neuropsych consult for formal capacity evaluation if patient continues to refuse needed care  · Discussed with ICU resident     Code Status:  Full code - Level 1   Decisional apparatus:  Patient is not competent on my exam today  If competence is lost, patient's substitute decision maker would default to son by [place]  Advance Directive / Living Will / POLST:  None on file         We appreciate the invitation to be involved in this patient's care  We will continue to follow  Please do not hesitate to reach our on call provider through our clinic answering service at  should you have acute symptom control concerns  Esme Forbes MD  Palliative and Supportive Care  Clinic/Answering Service: [phone]  You can find me on [phone]!        IDENTIFICATION:  Inpatient consult to Palliative Care  Consult performed by: Esme Forbes MD  Consult ordered by: David Sánchez MD        Physician Requesting Consult: Azul Alcantar DO  Reason for Consult / Principal Problem:  Goals  Hx and PE limited by:  None    HISTORY OF PRESENT ILLNESS:       Riki Parmar  is a 78 y o  male who presents with ***    ROS    Past Medical History:   Diagnosis Date   • Abdominal pain    • MARANDA (acute kidney injury) ([facility] [address] ) 6/19/2018   • Cardiac disease    • CHF (congestive heart failure) ([facility] [address] )    • COPD, severe ([facility] [address] )    • Coronary artery disease    • DDD (degenerative disc disease), lumbar 9/1/2020   • Diabetes mellitus ([facility] )    • Dyspnea    • GERD (gastroesophageal reflux disease)    • Hyperlipidemia    • Hypertension    • MI (myocardial infarction) ([facility] )     with 3 stents   • Nodule of apex of right lung    • JACQUELINE (obstructive sleep apnea)    • Prostate cancer [facility])      Past Surgical History:   Procedure Laterality Date   • ABDOMINAL SURGERY      exploratory   • ANGIOPLASTY      3 stents   • APPENDECTOMY     • COLONOSCOPY     • CT NEEDLE BIOPSY LUNG  11/3/2020   • ESOPHAGOGASTRODUODENOSCOPY N/A 10/2/2017    Procedure: ESOPHAGOGASTRODUODENOSCOPY (EGD); Surgeon: Patricia Cronin MD;  Location: [facility]; Service: Gastroenterology   • IR THORACENTESIS  11/3/2020   • IR THORACENTESIS  2022   • IR THORACENTESIS  2022   • KNEE CARTILAGE SURGERY     • OTHER SURGICAL HISTORY      stent placement   • PROSTATE SURGERY     • SKIN GRAFT      Basal cell CA back     Social History     Socioeconomic History   • Marital status:      Spouse name: Not on file   • Number of children: 1   • Years of education: 8   • Highest education level: Not on file   Occupational History   • Not on file   Tobacco Use   • Smoking status: Former     Packs/day: 1 50     Years: 50 00     Pack years: 75 00     Types: Cigarettes     Start date: 38     Quit date: 2020     Years since quittin 3   • Smokeless tobacco: Never   Vaping Use   • Vaping Use: Never used   Substance and Sexual Activity   • Alcohol use: Never   • Drug use: No   • Sexual activity: Not Currently     Partners: Female   Other Topics Concern   • Not on file   Social History Narrative    Most recent tobacco use screenin2018    Do you currently or have you served in the [employer]: No    Were you activated, into active duty, as a member of the [employer] or as a Reservist: No    Caffeine intake: Moderate    Alcohol intake: None    Marital status:      Number of children: 1    Education: 8    Occupation: retired    Sexual orientation: Heterosexual    General stress level: Medium    Live alone or with others: with others    Exercise level: None    Sexually active: No    Overweight: Yes    Obese: Yes    Guns present in home: No    Seat belts used routinely: Yes    Advance directive: No    Sunscreen used routinely: No    Smoke alarm in home: Yes    Legally blind in one or both eyes: No    Hard of hearing or deaf in one or both ears: No     Social Determinants of Health     Financial Resource Strain: Not on file   Food Insecurity: No Food Insecurity   • Worried About Running Out of Food in the Last Year: Never true   • Ran Out of Food in the Last Year: Never true   Transportation Needs: No Transportation Needs   • Lack of Transportation (Medical): No   • Lack of Transportation (Non-Medical): No   Physical Activity: Not on file   Stress: Not on file   Social Connections: Not on file   Intimate Partner Violence: Not on file   Housing Stability: Low Risk    • Unable to Pay for Housing in the Last Year: No   • Number of Places Lived in the Last Year: 1   • Unstable Housing in the Last Year: No     Family History   Problem Relation Age of Onset   • Heart disease Father    • Other Father         Mesothelioma        MEDICATIONS / ALLERGIES:    {[facility] RRIT:[id number]}    Allergies   Allergen Reactions   • Crestor [Rosuvastatin] Other (See Comments)     Unknown     • Lisinopril GI Intolerance, Dizziness and Abdominal Pain   • Metformin GI Intolerance       OBJECTIVE:    Physical Exam  Physical Exam    Lab Results: {[facility] PALLIATIVE CARE VQZN:95348}  Imaging Studies: ***  EKG, Pathology, and Other Studies: ***    Counseling / Coordination of Care    Total floor / unit time spent today *** minutes  Greater than 50% of total time was spent with the patient and / or family counseling and / or coordination of care  A description of the counseling / coordination of care: ***        This note was not shared with the patient due to {Reason why note was not shared:34733}

## 2022-11-27 NOTE — CONSULTS
Consultation - Palliative & Supportive Care   Miguel Medellin   78 y o   male  [facility] 201[facility]01   MRN: [id number]  Encounter: [id number]    ASSESSMENT:    Patient Active Problem List   Diagnosis   • COPD (chronic obstructive pulmonary disease) ([facility])   • HLD (hyperlipidemia)   • Type 2 diabetes mellitus with hyperglycemia, with long-term current use of insulin (HCC)   • Venous stasis dermatitis of both lower extremities   • Pleural effusion on right   • Chronic diastolic heart failure ([facility])   • Essential hypertension   • Diabetic neuropathy ([facility])   • CAD (coronary artery disease)   • Acute metabolic encephalopathy   • RBBB   • Oxygen dependent   • COPD with acute exacerbation ([facility])   • Pulmonary hypertension ([facility])   • JACQUELINE (obstructive sleep apnea)   • Lung nodule   • DDD (degenerative disc disease), lumbar   • Anemia, iron deficiency   • PAD (peripheral artery disease) ([facility])   • Acute on chronic respiratory failure with hypoxia and hypercapnia ([facility])   • Fracture of navicular bone of left foot   • Adenocarcinoma of lung ([facility])   • History of prostate cancer   • Stage 3b chronic kidney disease (CKD) ([facility])   • Chronic left-sided low back pain without sciatica   • Epididymitis, bilateral   • Hypothermia   • Frequent hospital admissions   • A-fib [facility])   • Acute-on-chronic kidney injury ([facility] [address] )   • SIRS (systemic inflammatory response syndrome) ([facility])   • Anemia   • Shortness of breath   • Sepsis ([facility])   • Pneumonia   • Acute on chronic diastolic heart failure ([facility])       Active problems addressed:  COPD  Acute on chronic hypercapnic and hypoxic respiratory failure  Goals of care    Consult is for goals of care    PLAN:    1  Goals:   · Saw patient with his sister-in-law present at bedside this morning  Patient appears groggy and slow to respond to questions but appears to be answering questions appropriately  However, he appears slightly disoriented    Patient does not appear to have the full capacity to make complex medical choice is some visit today  Though per review of notes, mentation is slowly improving except for overnight when he appeared more lethargic after receiving Seroquel last night  · Spent time discussing with patient the importance of being compliant with oxygen supplementation particularly the BiPAP  He said he does not appreciate the threat of death if he does not use it, that is why he is refusing it  With the help of his sister-in-law, we explained to him that this is not a threat but a possibility if he continues to refuse to wear the BiPAP when this is part of his care  · Sister-in-law then tells me that they had a family discussion (including patient's son) with the patient and that they have decided to continue with level 1 full code  · Recommend neuropsych consult for formal capacity evaluation if patient continues to refuse needed care  · I did mention hospice cares to his sister-in-law but it is apparent that they are not in this frame of mind yet  · Discussed with ICU resident    Code status: Level 1 - Full Code   Decisional apparatus:  Patient does not have capacity to make medical decisions on my exam today  If such capacity is lost, patient's substitute decision maker would default to son  by [place]  Advance Directive / Living Will / POLST:  None on file    2  Social Support:  • Supportive listening provided  • Patient's wife  several years ago  He now lives with his son  Son works at night with [employer]  When son works, patient is left mostly by himself at home  • Per sister-in-law, patient's home is recently deemed uninhabitable  His son is currently packing the home and looking for new place to live  At the time of this writing, patient and his son have no place to live  • Per sister-in-law, patient is very noncompliant and very strong headed  Still smokes at home but denies it to family    3   Symptom management:  • Defer to primary team    We appreciate the opportunity to participate in this patient's care  We will continue to follow  Please do not hesitate to contact our on-call provider through our clinic answering service at [phone] should you have acute symptom control concerns  IDENTIFICATION:  Consults  Reason for Consult / Principal Problem: goals of care    HISTORY OF PRESENT ILLNESS:    Shant Smallwood  is a 78 y o  male with COPD, CHF, CKD 3, who was admitted 6 days ago from home because of worsening shortness of breath  Upon arrival to the ER, patient was noted to be in significant respiratory distress requiring the use of BiPAP  He is currently receiving supportive cares for pneumonia, acute on chronic hypoxic and hypercapnic respiratory failure, MARANDA on CKD  Per review of notes, patient had been refusing to wear his BiPAP  Palliative was consulted to discuss goals of care  He has had several hospitalizations in the past, most of which are related to COPD exacerbation  Met with patient this morning  He was seen awake in bed and appears comfortable  However, he appears disoriented  His sister-in-law was present at bedside and tells me that he was confused and did not recognize her when she 1st came in  They think that this was because of the Seroquel that he received overnight  We spent time discussing his COPD management particularly the use of the BiPAP  Sister-in-law tells me that the patient and his son her being evicted from their house after it was recently deemed uninhabitable  The patient is also very hard headed and is noncompliant with oxygen at home  He also still smokes and denies it to family  Rest of the conversation as above  Interview and exam limited by:  None    Review of Systems   Constitutional: Positive for activity change and fatigue  Negative for appetite change  HENT: Negative for trouble swallowing  Respiratory: Negative for shortness of breath  Cardiovascular: Negative for chest pain  Gastrointestinal: Negative for abdominal pain, constipation, diarrhea, nausea and vomiting  Neurological: Positive for weakness  Psychiatric/Behavioral: Negative for sleep disturbance  The patient is not nervous/anxious  All other systems reviewed and are negative  Past Medical History:   Diagnosis Date   • Abdominal pain    • MARANDA (acute kidney injury) ([facility] [address] ) 2018   • Cardiac disease    • CHF (congestive heart failure) ([facility])    • COPD, severe ([facility])    • Coronary artery disease    • DDD (degenerative disc disease), lumbar 2020   • Diabetes mellitus (HCC)    • Dyspnea    • GERD (gastroesophageal reflux disease)    • Hyperlipidemia    • Hypertension    • [facility] )     with 3 stents   • Nodule of apex of right lung    • JACQUELINE (obstructive sleep apnea)    • Prostate cancer [facility])      Past Surgical History:   Procedure Laterality Date   • ABDOMINAL SURGERY      exploratory   • ANGIOPLASTY      3 stents   • APPENDECTOMY     • COLONOSCOPY     • CT NEEDLE BIOPSY LUNG  11/3/2020   • ESOPHAGOGASTRODUODENOSCOPY N/A 10/2/2017    Procedure: ESOPHAGOGASTRODUODENOSCOPY (EGD); Surgeon: Mnaju Ortiz MD;  Location: [facility]; Service: Gastroenterology   • IR THORACENTESIS  11/3/2020   • IR THORACENTESIS  2022   • IR THORACENTESIS  2022   • KNEE CARTILAGE SURGERY     • OTHER SURGICAL HISTORY      stent placement   • PROSTATE SURGERY     • SKIN GRAFT      Basal cell CA back     Social History     Socioeconomic History   • Marital status:       Spouse name: Not on file   • Number of children: 1   • Years of education: 8   • Highest education level: Not on file   Occupational History   • Not on file   Tobacco Use   • Smoking status: Former     Packs/day: 1 50     Years: 50 00     Pack years: 75 00     Types: Cigarettes     Start date: 38     Quit date: 2020     Years since quittin 3   • Smokeless tobacco: Never   Vaping Use   • Vaping Use: Never used   Substance and Sexual Activity   • Alcohol use: Never   • Drug use: No   • Sexual activity: Not Currently     Partners: Female   Other Topics Concern   • Not on file   Social History Narrative    Most recent tobacco use screenin2018    Do you currently or have you served in the [employer]: No    Were you activated, into active duty, as a member of the [employer] or as a Reservist: No    Caffeine intake: Moderate    Alcohol intake: None    Marital status:     Number of children: 1    Education: 8    Occupation: retired    Sexual orientation: Heterosexual    General stress level: Medium    Live alone or with others: with others    Exercise level: None    Sexually active: No    Overweight: Yes    Obese: Yes    Guns present in home: No    Seat belts used routinely: Yes    Advance directive: No    Sunscreen used routinely: No    Smoke alarm in home: Yes    Legally blind in one or both eyes: No    Hard of hearing or deaf in one or both ears: No     Social Determinants of Health     Financial Resource Strain: Not on file   Food Insecurity: No Food Insecurity   • Worried About Running Out of Food in the Last Year: Never true   • Ran Out of Food in the Last Year: Never true   Transportation Needs: No Transportation Needs   • Lack of Transportation (Medical): No   • Lack of Transportation (Non-Medical):  No   Physical Activity: Not on file   Stress: Not on file   Social Connections: Not on file   Intimate Partner Violence: Not on file   Housing Stability: Low Risk    • Unable to Pay for Housing in the Last Year: No   • Number of Places Lived in the Last Year: 1   • Unstable Housing in the Last Year: No     Family History   Problem Relation Age of Onset   • Heart disease Father    • Other Father         Mesothelioma        MEDICATIONS / ALLERGIES:  all current active meds have been reviewed    Allergies   Allergen Reactions   • Crestor [Rosuvastatin] Other (See Comments)     Unknown     • Lisinopril GI Intolerance, Dizziness and Abdominal Pain   • Metformin GI Intolerance       OBJECTIVE:  /65   Pulse 72   Temp 98 6 °F (37 °C) (Oral)   Resp 18   Ht 6' (1 829 m)   Wt 98 5 kg (217 lb 3 2 oz)   SpO2 100%   BMI 29 46 kg/m²   Physical Exam:  Constitutional: Appears well-developed and well-nourished  Appears stated age  Appears acutely and chronically ill-appearing  Does not appear toxic looking  Slightly disheveled  In no acute physical or emotional distress  Head: Normocephalic and atraumatic  Eyes: EOM are normal  No ocular discharge  No scleral icterus  Neck: No visible adenopathy or masses  Respiratory: Effort normal  No stridor  No respiratory distress  On nasal cannula  Gastrointestinal: No abdominal distension  Musculoskeletal: No edema  Neurological: Awake, groggy, disoriented but answers some questions appropriately  Skin: Dry, no diaphoresis  Clammy  Psychiatric: Displays a normal mood and affect  Behavior, judgment and thought content appear questionable  Lab Results: I have personally reviewed pertinent labs  Imaging Studies: I have personally reviewed pertinent reports  EKG, Pathology, and Other Studies: I have personally reviewed pertinent reports  Counseling / Coordination of Care:  Counseling / Coordination of Care  Total floor / unit time spent today 30 minutes  Greater than 50% of total time was spent with the patient and / or family counseling and / or coordination of care  A description of the counseling / coordination of care: provided medical updates, discussed palliative care, discussed hospice care, determined competency, determined goals of care, determined POA, determined social/family support, discussed plans of care, discussed symptom management, provided psychosocial support       Lynn Salgado MD  [address] and Supportive Care  [phone]

## 2022-11-28 ENCOUNTER — TELEPHONE (OUTPATIENT)
Dept: FAMILY MEDICINE CLINIC | Facility: CLINIC | Age: 77
End: 2022-11-28

## 2022-11-28 ENCOUNTER — APPOINTMENT (INPATIENT)
Dept: RADIOLOGY | Facility: HOSPITAL | Age: 77
End: 2022-11-28

## 2022-11-28 LAB
ALBUMIN SERPL BCP-MCNC: 2.1 G/DL (ref 3.5–5)
ALP SERPL-CCNC: 59 U/L (ref 46–116)
ALT SERPL W P-5'-P-CCNC: 9 U/L (ref 12–78)
ANION GAP SERPL CALCULATED.3IONS-SCNC: 3 MMOL/L (ref 4–13)
AST SERPL W P-5'-P-CCNC: 7 U/L (ref 5–45)
BACTERIA BLD CULT: NORMAL
BACTERIA BLD CULT: NORMAL
BILIRUB SERPL-MCNC: 0.3 MG/DL (ref 0.2–1)
BUN SERPL-MCNC: 52 MG/DL (ref 5–25)
CALCIUM ALBUM COR SERPL-MCNC: 9.7 MG/DL (ref 8.3–10.1)
CALCIUM SERPL-MCNC: 8.2 MG/DL (ref 8.3–10.1)
CHLORIDE SERPL-SCNC: 99 MMOL/L (ref 96–108)
CO2 SERPL-SCNC: 37 MMOL/L (ref 21–32)
CREAT SERPL-MCNC: 2.61 MG/DL (ref 0.6–1.3)
ERYTHROCYTE [DISTWIDTH] IN BLOOD BY AUTOMATED COUNT: 15.1 % (ref 11.6–15.1)
GFR SERPL CREATININE-BSD FRML MDRD: 22 ML/MIN/1.73SQ M
GLUCOSE SERPL-MCNC: 145 MG/DL (ref 65–140)
GLUCOSE SERPL-MCNC: 175 MG/DL (ref 65–140)
GLUCOSE SERPL-MCNC: 188 MG/DL (ref 65–140)
GLUCOSE SERPL-MCNC: 189 MG/DL (ref 65–140)
GLUCOSE SERPL-MCNC: 219 MG/DL (ref 65–140)
HCT VFR BLD AUTO: 26.1 % (ref 36.5–49.3)
HGB BLD-MCNC: 7.6 G/DL (ref 12–17)
MAGNESIUM SERPL-MCNC: 2.2 MG/DL (ref 1.6–2.6)
MCH RBC QN AUTO: 25.2 PG (ref 26.8–34.3)
MCHC RBC AUTO-ENTMCNC: 29.1 G/DL (ref 31.4–37.4)
MCV RBC AUTO: 86 FL (ref 82–98)
PHOSPHATE SERPL-MCNC: 3.9 MG/DL (ref 2.3–4.1)
PLATELET # BLD AUTO: 154 THOUSANDS/UL (ref 149–390)
PMV BLD AUTO: 10.8 FL (ref 8.9–12.7)
POTASSIUM SERPL-SCNC: 4.8 MMOL/L (ref 3.5–5.3)
PROT SERPL-MCNC: 6.4 G/DL (ref 6.4–8.4)
RBC # BLD AUTO: 3.02 MILLION/UL (ref 3.88–5.62)
SODIUM SERPL-SCNC: 139 MMOL/L (ref 135–147)
WBC # BLD AUTO: 7.43 THOUSAND/UL (ref 4.31–10.16)

## 2022-11-28 RX ADMIN — INSULIN LISPRO 1 UNITS: 100 INJECTION, SOLUTION INTRAVENOUS; SUBCUTANEOUS at 12:31

## 2022-11-28 RX ADMIN — INSULIN LISPRO 5 UNITS: 100 INJECTION, SOLUTION INTRAVENOUS; SUBCUTANEOUS at 17:35

## 2022-11-28 RX ADMIN — APIXABAN 5 MG: 5 TABLET, FILM COATED ORAL at 08:45

## 2022-11-28 RX ADMIN — PANTOPRAZOLE SODIUM 40 MG: 40 TABLET, DELAYED RELEASE ORAL at 17:38

## 2022-11-28 RX ADMIN — INSULIN LISPRO 5 UNITS: 100 INJECTION, SOLUTION INTRAVENOUS; SUBCUTANEOUS at 08:50

## 2022-11-28 RX ADMIN — IPRATROPIUM BROMIDE 0.5 MG: 0.5 SOLUTION RESPIRATORY (INHALATION) at 03:01

## 2022-11-28 RX ADMIN — TAMSULOSIN HYDROCHLORIDE 0.4 MG: 0.4 CAPSULE ORAL at 17:38

## 2022-11-28 RX ADMIN — IPRATROPIUM BROMIDE 0.5 MG: 0.5 SOLUTION RESPIRATORY (INHALATION) at 13:24

## 2022-11-28 RX ADMIN — QUETIAPINE FUMARATE 12.5 MG: 25 TABLET ORAL at 22:24

## 2022-11-28 RX ADMIN — IPRATROPIUM BROMIDE 0.5 MG: 0.5 SOLUTION RESPIRATORY (INHALATION) at 07:38

## 2022-11-28 RX ADMIN — SENNOSIDES AND DOCUSATE SODIUM 1 TABLET: 8.6; 5 TABLET ORAL at 22:24

## 2022-11-28 RX ADMIN — ISOSORBIDE MONONITRATE 30 MG: 30 TABLET, EXTENDED RELEASE ORAL at 08:46

## 2022-11-28 RX ADMIN — GABAPENTIN 100 MG: 100 CAPSULE ORAL at 22:24

## 2022-11-28 RX ADMIN — FORMOTEROL FUMARATE DIHYDRATE 20 MCG: 20 SOLUTION RESPIRATORY (INHALATION) at 20:42

## 2022-11-28 RX ADMIN — APIXABAN 5 MG: 5 TABLET, FILM COATED ORAL at 17:32

## 2022-11-28 RX ADMIN — LEVALBUTEROL HYDROCHLORIDE 1.25 MG: 1.25 SOLUTION, CONCENTRATE RESPIRATORY (INHALATION) at 03:02

## 2022-11-28 RX ADMIN — ASPIRIN 81 MG: 81 TABLET, COATED ORAL at 08:45

## 2022-11-28 RX ADMIN — FORMOTEROL FUMARATE DIHYDRATE 20 MCG: 20 SOLUTION RESPIRATORY (INHALATION) at 07:38

## 2022-11-28 RX ADMIN — INSULIN LISPRO 1 UNITS: 100 INJECTION, SOLUTION INTRAVENOUS; SUBCUTANEOUS at 08:49

## 2022-11-28 RX ADMIN — LEVALBUTEROL HYDROCHLORIDE 1.25 MG: 1.25 SOLUTION, CONCENTRATE RESPIRATORY (INHALATION) at 13:24

## 2022-11-28 RX ADMIN — PRAVASTATIN SODIUM 80 MG: 80 TABLET ORAL at 17:33

## 2022-11-28 RX ADMIN — CARVEDILOL 6.25 MG: 6.25 TABLET, FILM COATED ORAL at 08:46

## 2022-11-28 RX ADMIN — LEVALBUTEROL HYDROCHLORIDE 1.25 MG: 1.25 SOLUTION, CONCENTRATE RESPIRATORY (INHALATION) at 07:38

## 2022-11-28 RX ADMIN — FLUTICASONE PROPIONATE 1 PUFF: 110 AEROSOL, METERED RESPIRATORY (INHALATION) at 22:00

## 2022-11-28 RX ADMIN — BUMETANIDE 1 MG: 0.25 INJECTION, SOLUTION INTRAMUSCULAR; INTRAVENOUS at 08:51

## 2022-11-28 RX ADMIN — BUMETANIDE 1 MG: 0.25 INJECTION, SOLUTION INTRAMUSCULAR; INTRAVENOUS at 17:35

## 2022-11-28 RX ADMIN — PANTOPRAZOLE SODIUM 40 MG: 40 TABLET, DELAYED RELEASE ORAL at 08:46

## 2022-11-28 RX ADMIN — IPRATROPIUM BROMIDE 0.5 MG: 0.5 SOLUTION RESPIRATORY (INHALATION) at 20:40

## 2022-11-28 RX ADMIN — INSULIN GLARGINE 30 UNITS: 100 INJECTION, SOLUTION SUBCUTANEOUS at 22:24

## 2022-11-28 RX ADMIN — GABAPENTIN 100 MG: 100 CAPSULE ORAL at 17:32

## 2022-11-28 RX ADMIN — INSULIN LISPRO 5 UNITS: 100 INJECTION, SOLUTION INTRAVENOUS; SUBCUTANEOUS at 12:31

## 2022-11-28 RX ADMIN — CEFTAZIDIME 2000 MG: 1 INJECTION, POWDER, FOR SOLUTION INTRAMUSCULAR; INTRAVENOUS at 01:52

## 2022-11-28 RX ADMIN — GABAPENTIN 100 MG: 100 CAPSULE ORAL at 08:46

## 2022-11-28 RX ADMIN — BUDESONIDE 0.5 MG: 0.5 INHALANT ORAL at 20:40

## 2022-11-28 RX ADMIN — CARVEDILOL 6.25 MG: 6.25 TABLET, FILM COATED ORAL at 17:35

## 2022-11-28 RX ADMIN — FLUTICASONE PROPIONATE 1 PUFF: 110 AEROSOL, METERED RESPIRATORY (INHALATION) at 08:49

## 2022-11-28 RX ADMIN — LEVALBUTEROL HYDROCHLORIDE 1.25 MG: 1.25 SOLUTION, CONCENTRATE RESPIRATORY (INHALATION) at 20:41

## 2022-11-28 RX ADMIN — CEFTAZIDIME 2000 MG: 1 INJECTION, POWDER, FOR SOLUTION INTRAMUSCULAR; INTRAVENOUS at 13:20

## 2022-11-28 RX ADMIN — BUDESONIDE 0.5 MG: 0.5 INHALANT ORAL at 07:38

## 2022-11-28 NOTE — PROGRESS NOTES
NEPHROLOGY PROGRESS NOTE   Gayle Medina  78 y o  male MRN: [id number]  Unit/Bed#: [facility] 201-01 Encounter: [id number]  Reason for Consult: MARANDA    ASSESSMENT AND PLAN:  MARANDA on CKD stage 4, baseline creatinine 2 5 to 2 7  -peak creatinine 3 2 now seems to have improved to 2 4 yesterday although slightly increased to 2 6 with ongoing diuresis  -MARANDA suspect secondary to component of cardiorenal, bacteremia, suspected UTI, contributing to ATN  -may have to accept higher creatinine to keep patient euvolemic  -IV diuretics as below  -recent UA showed innumerable WBCs, 20 to 30 RBCs, 1+ proteinuria (in the setting of suspected UTI), will need eventual repeat UA with microscopy  -BMP in a m   -renal ultrasound shows no hydro, normal size kidneys, normal echogenicity  Acute on chronic CHF  -echo in May 2022 shows EF 22%, normal diastolic function  -currently remains on 3 L O2 via nasal cannula  Overall volume status seems to be improving   -weight somewhat fluctuating, good diuresis, negative balance over last 24 hours  -continue IV Bumex 1 mg b i d  For now  Hopeful transition to p o  Diuretics in next 24 to 48 hours  -continue intake and output monitoring, daily weight    Anemia in CKD, continue to monitor hemoglobin, transfuse p r n  For hemoglobin less than seven  -recent FOBT positive, component of GI bleed    Pseudomonas bacteremia, suspected UTI,? Pneumonia, on antibiotic as per ID    Acute on chronic hypoxic/hypercapnic respiratory failure, BiPAP p r n  Dara Soulier -currently on 3 L O2 via nasal cannula  Management as per ICU    Encephalopathy, slowly improving continue to monitor    Discussed above plan in detail with ICU team    SUBJECTIVE:  Patient seen and examined at bedside  Still has off and on shortness of breath but somewhat better  Currently on 3 L O2 via nasal cannula  Denies any nausea, vomiting      OBJECTIVE:  Current Weight: Weight - Scale: 102 kg (224 lb 6 9 oz)  Vitals:    11/28/22 0810   BP: 155/66   Pulse: 62   Resp:    Temp: 98 2 °F (36 8 °C)   SpO2: 98%       Intake/Output Summary (Last 24 hours) at 11/28/2022 1052  Last data filed at 11/28/2022 0900  Gross per 24 hour   Intake 1280 ml   Output 2184 ml   Net -904 ml     Wt Readings from Last 3 Encounters:   11/28/22 102 kg (224 lb 6 9 oz)   11/09/22 103 kg (227 lb 3 2 oz)   11/01/22 97 4 kg (214 lb 11 7 oz)     Temp Readings from Last 3 Encounters:   11/28/22 98 2 °F (36 8 °C) (Oral)   11/09/22 98 2 °F (36 8 °C) (Oral)   11/02/22 98 5 °F (36 9 °C)     BP Readings from Last 3 Encounters:   11/28/22 155/66   11/09/22 (!) 118/46   11/02/22 140/58     Pulse Readings from Last 3 Encounters:   11/28/22 62   11/09/22 65   11/02/22 94        Physical Examination:  General:  Lying in bed, no acute distress   Eyes:  Mild conjunctival pallor present  ENT:  External examination of ears and nose unremarkable  Neck:  No obvious lymphadenopathy appreciated  Respiratory:  Bilateral air entry present, occasional inspiratory crackles present  CVS:  S1, S2 present  GI:  Soft, nondistended  CNS:  Active, alert, follows commands  Skin:  No new rash  Musculoskeletal:  No obvious new gross deformity noted    Medications:    Current Facility-Administered Medications:   •  apixaban (ELIQUIS) tablet 5 mg, 5 mg, Oral, BID, Darell Ward MD, 5 mg at 11/28/22 0845  •  aspirin (ECOTRIN LOW STRENGTH) EC tablet 81 mg, 81 mg, Oral, Daily, Darell Ward MD, 81 mg at 11/28/22 0845  •  budesonide (PULMICORT) inhalation solution 0 5 mg, 0 5 mg, Nebulization, Q12H, Darell Ward MD, 0 5 mg at 11/28/22 2466  •  bumetanide (BUMEX) injection 1 mg, 1 mg, Intravenous, BID, Gurjit Loo MD, 1 mg at 11/28/22 4872  •  carvedilol (COREG) tablet 6 25 mg, 6 25 mg, Oral, BID With Meals, Darell Ward MD, 6 25 mg at 11/28/22 0846  •  cefTAZidime (FORTAZ) 2,000 mg in sodium chloride 0 9 % 50 mL IVPB, 2,000 mg, Intravenous, Q12H, Cynthia Plunkett MD, Last Rate: 100 mL/hr at 11/28/22 0152, 2,000 mg at 11/28/22 0152  •  fluticasone (FLOVENT HFA) 110 MCG/ACT inhaler 1 puff, 1 puff, Inhalation, Q12H [address], Catarina Bruce MD, 1 puff at 11/28/22 0849  •  formoterol (PERFOROMIST) nebulizer solution 20 mcg, 20 mcg, Nebulization, Q12H, Catarina Bruce MD, 20 mcg at 11/28/22 8615  •  gabapentin (NEURONTIN) capsule 100 mg, 100 mg, Oral, TID, Catarina Bruce MD, 100 mg at 11/28/22 0846  •  insulin glargine (LANTUS) subcutaneous injection 30 Units 0 3 mL, 30 Units, Subcutaneous, HS, Catarina Bruce MD, 30 Units at 11/27/22 2243  •  insulin lispro (HumaLOG) 100 units/mL subcutaneous injection 1-6 Units, 1-6 Units, Subcutaneous, TID AC, 1 Units at 11/28/22 0849 **AND** Fingerstick Glucose (POCT), , , TID AC, Bobby Tidwell DO  •  insulin lispro (HumaLOG) 100 units/mL subcutaneous injection 5 Units, 5 Units, Subcutaneous, TID With Meals, Bobby Tidwell DO, 5 Units at 11/28/22 0850  •  ipratropium (ATROVENT) 0 02 % inhalation solution 0 5 mg, 0 5 mg, Nebulization, Q6H, Catarina Bruce MD, 0 5 mg at 11/28/22 2993  •  isosorbide mononitrate (IMDUR) 24 hr tablet 30 mg, 30 mg, Oral, Daily, Catarina Bruce MD, 30 mg at 11/28/22 0846  •  levalbuterol (XOPENEX) inhalation solution 1 25 mg, 1 25 mg, Nebulization, Q6H, Catarina Bruce MD, 1 25 mg at 11/28/22 4123  •  ondansetron (ZOFRAN) injection 4 mg, 4 mg, Intravenous, Q6H PRN, Catarina Bruce MD  •  pantoprazole (PROTONIX) EC tablet 40 mg, 40 mg, Oral, BID AC, Catarina Bruce MD, 40 mg at 11/28/22 0846  •  pravastatin (PRAVACHOL) tablet 80 mg, 80 mg, Oral, Daily With Jestine Guillen, MD, 80 mg at 11/27/22 1718  •  QUEtiapine (SEROquel) tablet 12 5 mg, 12 5 mg, Oral, HS, Catarina Bruce MD, 12 5 mg at 11/27/22 2234  •  senna-docusate sodium (SENOKOT S) 8 6-50 mg per tablet 1 tablet, 1 tablet, Oral, HS, Catarina Bruce MD, 1 tablet at 11/27/22 2234  •  tamsulosin (FLOMAX) capsule 0 4 mg, 0 4 mg, Oral, Daily With Shazia Guillen MD, 0 4 mg at 11/27/22 1719    Laboratory Results:  Results from last 7 days   Lab Units 11/28/22  0455 11/27/22  0834 11/27/22  0518 11/26/22  0504 11/25/22  0809 11/25/22  0449 11/24/22  2034 11/24/22  1054 11/24/22  0607 11/23/22  0451 11/22/22  0521   WBC Thousand/uL 7 43  --  6 81 6 06 6 55 6 28  --   --  8 00 10 58* 12 99*   HEMOGLOBIN g/dL 7 6*  --  7 8* 7 4* 6 7* 7 1*  --   --  7 7* 7 8* 7 1*   HEMATOCRIT % 26 1*  --  26 2* 26 0* 21 6* 23 7*  --   --  27 9* 26 5* 24 8*   PLATELETS Thousands/uL 154  --  141* 136* 135* 135*  --   --  103* 143* 132*   SODIUM mmol/L 139  --  135 142  --  140 140  --  134* 137 137   POTASSIUM mmol/L 4 8 4 5 5 7* 4 3  --  4 6 4 8 4 7 5 5* 4 5 4 2   CHLORIDE mmol/L 99  --  99 104  --  105 103  --  105 101 102   CO2 mmol/L 37*  --  31 32  --  35* 34*  --  21 30 31   BUN mg/dL 52*  --  54* 58*  --  59* 58*  --  61* 60* 46*   CREATININE mg/dL 2 61*  --  2 41* 2 58*  --  2 84* 3 04*  --  3 24* 3 26* 2 72*   CALCIUM mg/dL 8 2*  --  8 4 8 3  --  8 5 8 7  --  8 3 8 4 8 3   MAGNESIUM mg/dL 2 2  --  2 5 2 5  --  2 5 2 6  --  3 0*  --  2 5   PHOSPHORUS mg/dL 3 9  --  3 7 3 5  --  3 9 4 4*  --  5 0*  --  3 8       XR chest portable ICU   Final Result by Tomeka Mccoy MD (11/25 0607)      Unchanged moderate right pleural effusion, and right hemithorax volume loss with right basilar atelectasis  Workstation performed: Valeriy Hoop kidney and bladder   Final Result by Cary Duarte MD (11/25 3602)      No evidence of obstruction  Ascites  Cholelithiasis  Workstation performed: [id number]         CT head wo contrast   Final Result by Apolonia Pina MD (11/24 1814)      No acute intracranial abnormality  Chronic microangiopathic changes  Workstation performed: [id number]         XR chest portable   Final Result by Trung Vila MD (11/24 1515)         1  New rounded cavity in the left base raises concern for developing pneumonia    Otherwise, negative findings in the chest       The study was marked in [facility] for immediate notification  Workstation performed: [id number]         IR IN-Patient Thoracentesis   Final Result by Geryl Gaucher, MD (11/23 1918)   Impression:   1  Successful ultrasound-guided thoracentesis yielding 900 mL of serosanguineous pleural fluid  Workstation performed: [id number]         XR chest portable ICU   Final Result by Holger Baumann MD (11/21 1014)      Moderate right pleural effusion with associated right lung base opacity  Workstation performed: [id number]         CT chest wo contrast   Final Result by Sonam Jones MD (11/20 1758)      1  Increase in moderate right pleural effusion with new small left effusion  2   New near complete occlusion of middle lobe and right lower lobe bronchial branches with atelectasis  3  New small perihepatic ascites  The study was marked in [facility] for immediate notification  Workstation performed: [id number]         XR chest 1 view portable   Final Result by Jiles Bumpers, MD (11/20 2056)      Increased size right pleural effusion    Workstation performed: [id number]         XR chest portable ICU    (Results Pending)       Portions of the record may have been created with voice recognition software  Occasional wrong word or "sound a like" substitutions may have occurred due to the inherent limitations of voice recognition software  Read the chart carefully and recognize, using context, where substitutions have occurred

## 2022-11-28 NOTE — QUICK NOTE
11/28/2022 10:05 AM -  Pawan Addison Sr 's chart and case were reviewed by Deborah Leal PA-C  Mode of review included electronic chart check, and brief visualization  Per review, symptoms remain controlled on current regimen and no changes are made at this time  Patient has been tolerating BiPAP HS  Please continue the regimen in place, and review our last note for details  For dispo plan, please review Case Management notes  For urgent issues or any questions/concerns, please notify on-call provider via [address] [place] [address]  You may also call our answering service 24/7 at   Deborah Leal PA-C  Palliative and Supportive Care  Clinic/Answering Service: [phone]  You can find me on Peri!

## 2022-11-28 NOTE — TELEPHONE ENCOUNTER
Call her  I have not seen pt in over 6 mths so I can not sign form  Pt has been a no-show for many appts with me in past few months   If pt will schedule 30 min ov, I can do his face to face eval

## 2022-11-28 NOTE — PROGRESS NOTES
Progress Note - Infectious Disease   Jah Zambrano Sr  78 y o  male MRN: [id number]  [facility] Encounter: [id number]      Impression/Plan:  1  Sepsis-present on admission   Leukocytosis and tachypnea   Appears to be secondary to Pseudomonas bacteremia from urinary tract infection  No other definitive source appreciated   The patient has remained hemodynamically stable and seems to be tolerating the antibiotics without difficulty  -continue antibiotics as below  -recheck CBC with diff and BMP  -supportive care     2  Pseudomonas bacteremia-appears to be secondary to urinary tract infection in the setting of urinary retention   No other clear source appreciated   Could represent bacteremia from a pneumonia but the patient's pulmonary status seems to be around baseline and the CT scan has some chronic findings of the right base   Repeat blood cultures negative  -increase ceftazidime to 2 g IV q 12 hours with improved renal function  -plan IV antibiotics through at least tomorrow which will complete 10 days of any pseudomonal coverage     3  Pseudomonas UTI-in the setting of urinary retention   The patient now has a Cook catheter in place  Ultrasound without hydronephrosis or abscess  -antibiotics as above  -Cook drainage  -monitor symptomatology       4  Acute kidney injury-complicating chronic kidney disease   Renal function worsening   Suspected multifactorial including cardiorenal syndrome, sepsis, and possible ATN   Pre renal issues may be playing a role  Renal function has improved   -dose adjust antibiotics as above  -recheck BMP  -volume management  -close nephrology follow-up     5  Acute hypoxic respiratory failure-with increased pleural effusion on the setting of known adenocarcinoma of the lung   Volume loss the right base seems somewhat chronic   His respiratory status has deteriorated and he is now back on BiPAP   He underwent thoracentesis on 11/23/2022    Repeat chest x-ray stable  -follow-up pleural fluid cytology and culture  -BiPAP support  -monitor respiratory status     6  Acute encephalopathy-suspect multifactorial including metabolic issues, sepsis   Waxing waning but now with decreased cognition in the setting of CO2 retention  Improvement of the CO2 retention but still with some cognitive abnormalities  Possible medication effect from cefepime but in little change since changing to ceftazidime  -treatment of respiratory failure  -monitor cognition  -discontinue antibiotics after last dose tomorrow     7  Diabetes mellitus-type 2 with hyperglycemia with long-term insulin use     Discussed the above management plan with the critical care service    Antibiotics:  Ceftazidime 4  Antibiotics 9     Subjective:  Patient has no fever, chills, sweats; no nausea, vomiting, diarrhea; no cough, shortness of breath; no pain  No new symptoms  Remains on BiPAP  He is quite sleepy  Objective:  Vitals:  Temp:  [97 8 °F (36 6 °C)-98 4 °F (36 9 °C)] 97 8 °F (36 6 °C)  HR:  [64-82] 64  Resp:  [16-22] 16  BP: (102-143)/(51-73) 105/51  SpO2:  [95 %-100 %] 100 %  Temp (24hrs), Av 2 °F (36 8 °C), Min:97 8 °F (36 6 °C), Max:98 4 °F (36 9 °C)  Current: Temperature: 97 8 °F (36 6 °C)    Physical Exam:   General Appearance:  Somnolent on BiPAP, nontoxic, no acute distress  Throat: Oropharynx moist without lesions  Lungs:   Decreased breath sounds bilaterally; no wheezes, rhonchi or rales; respirations unlabored   Heart:  RRR; no murmur, rub or gallop   Abdomen:   Soft, non-tender, non-distended, positive bowel sounds  Extremities: No clubbing, cyanosis or edema   Skin: No new rashes or lesions  No draining wounds noted         Labs, Imaging, & Other studies:   All pertinent labs and imaging studies were personally reviewed  Results from last 7 days   Lab Units 22  0455 22  0518 22  0504   WBC Thousand/uL 7 43 6 81 6 06   HEMOGLOBIN g/dL 7 6* 7 8* 7 4*   PLATELETS Thousands/uL 154 141* 136*     Results from last 7 days   Lab Units 11/28/22  0455 11/27/22  0834 11/27/22  0518 11/26/22  0504 11/25/22  0449   SODIUM mmol/L 139  --  135 142 140   POTASSIUM mmol/L 4 8   < > 5 7* 4 3 4 6   CHLORIDE mmol/L 99  --  99 104 105   CO2 mmol/L 37*  --  31 32 35*   BUN mg/dL 52*  --  54* 58* 59*   CREATININE mg/dL 2 61*  --  2 41* 2 58* 2 84*   EGFR ml/min/1 73sq m 22  --  25 23 20   CALCIUM mg/dL 8 2*  --  8 4 8 3 8 5   AST U/L 7  --  48*  --  6   ALT U/L 9*  --  11*  --  7*   ALK PHOS U/L 59  --  67  --  59    < > = values in this interval not displayed  Results from last 7 days   Lab Units 11/23/22  1804 11/22/22  1700   BLOOD CULTURE   --  No Growth After 5 Days  No Growth After 5 Days  GRAM STAIN RESULT  1+ Polys  --    BODY FLUID CULTURE, STERILE  No growth  --      Results from last 7 days   Lab Units 11/25/22  0449 11/23/22  0451   PROCALCITONIN ng/ml 0 64* 1 37*         Results from last 7 days   Lab Units 11/22/22  1703   FERRITIN ng/mL 63        chest x-ray-unchanged moderate right-sided effusion and right hemithorax volume loss      Images personally reviewed by me in PACS

## 2022-11-28 NOTE — CASE MANAGEMENT
Case Management Discharge Planning Note    Patient name Nancy Mishra Sr   Location [facility] 201/[facility] 705-04 MRN [id number]  : 1945 Date 2022       Current Admission Date: 2022  Current Admission Diagnosis:Acute on chronic respiratory failure with hypoxia and hypercapnia [facility])   Patient Active Problem List    Diagnosis Date Noted   • Sepsis ([place] [address] ) 2022   • Pneumonia 2022   • Acute on chronic diastolic heart failure ([place] [address] ) 2022   • Shortness of breath 2022   • Anemia 10/29/2022   • Acute-on-chronic kidney injury ([place] [address] ) 10/27/2022   • SIRS (systemic inflammatory response syndrome) ([place] [address] ) 10/27/2022   • A-fib ([place] [address] ) 10/10/2022   • Frequent hospital admissions 2022   • Hypothermia 2022   • Epididymitis, bilateral 06/15/2022   • Chronic left-sided low back pain without sciatica 2022   • Stage 3b chronic kidney disease (CKD) ([place] [address] ) 2022   • History of prostate cancer 2022   • Adenocarcinoma of lung ([place] [address] ) 2022   • Fracture of navicular bone of left foot 2021   • Acute on chronic respiratory failure with hypoxia and hypercapnia ([place] [address] ) 04/15/2021   • PAD (peripheral artery disease) ([place] [address] ) 2021   • DDD (degenerative disc disease), lumbar 2020   • Anemia, iron deficiency 2020   • Lung nodule 2020   • Pulmonary hypertension ([place] [address] ) 2019   • JACQUELINE (obstructive sleep apnea) 2019   • COPD with acute exacerbation ([place] [address] ) 2019   • Oxygen dependent 2018   • Acute metabolic encephalopathy    • RBBB 2018   • CAD (coronary artery disease) 2018   • Chronic diastolic heart failure ([place] [address] ) 2018   • Pleural effusion on right 10/31/2017   • Diabetic neuropathy ([address] ) 2017   • Essential hypertension 2016   • Venous stasis dermatitis of both lower extremities 11/15/2016   • Type 2 diabetes mellitus with hyperglycemia, with long-term current use of insulin ([facility] [address] ) 2016   • COPD (chronic obstructive pulmonary disease) ([facility] [address] ) 01/20/2016   • HLD (hyperlipidemia) 01/20/2016      LOS (days): 8  Geometric Mean LOS (GMLOS) (days): 5 00  Days to GMLOS:-3 1     OBJECTIVE:  Risk of Unplanned Readmission Score: 86 86         Current admission status: Inpatient   Preferred Pharmacy:   [address] [place] [address], [address] [place] [address] [address] [place] [address] [address] [place] [address]  Phone: [phone] Fax: [phone] [facility] [address] [facility] 38 [address] [facility]  Phone: [phone] Fax: [phone]    Primary Care Provider: Agus Cowan MD    Primary Insurance: [facility]  Secondary Insurance:     DISCHARGE DETAILS:            Treatment Team Recommendation: Short Term Rehab  Discharge Destination Plan[de-identified] Short Term Rehab  Transport at Discharge : BLS Ambulance         Additional Comments: Met with patient at bedside to review rehab list and discuss choices  Also spoke with son via telephone  Patient would like to stay in [facility], [facility] is first choice  Sent messages to all accepting facilities to confirm they are still able to offer a bed (first offer prior to holiday)  [facility] would be able to offer a bed as well  Spoke with resident, rachael KUHN, awaiting attending signature to send paperwork to [facility] for [facility] to follow

## 2022-11-28 NOTE — PLAN OF CARE
Problem: MOBILITY - ADULT  Goal: Maintain or return to baseline ADL function  Description: INTERVENTIONS:  -  Assess patient's ability to carry out ADLs; assess patient's baseline for ADL function and identify physical deficits which impact ability to perform ADLs (bathing, care of mouth/teeth, toileting, grooming, dressing, etc )  - Assess/evaluate cause of self-care deficits   - Assess range of motion  - Assess patient's mobility; develop plan if impaired  - Assess patient's need for assistive devices and provide as appropriate  - Encourage maximum independence but intervene and supervise when necessary  - Involve family in performance of ADLs  - Assess for home care needs following discharge   - Consider OT consult to assist with ADL evaluation and planning for discharge  - Provide patient education as appropriate  Outcome: Progressing  Goal: Maintains/Returns to pre admission functional level  Description: INTERVENTIONS:  - Perform BMAT or MOVE assessment daily    - Set and communicate daily mobility goal to care team and patient/family/caregiver  - Collaborate with rehabilitation services on mobility goals if consulted  - Perform Range of Motion 3 times a day  - Reposition patient every 2 hours    - Dangle patient 3 times a day  - Stand patient 3 times a day  - Ambulate patient 3 times a day  - Out of bed to chair 3 times a day   - Out of bed for meals 3 times a day  - Out of bed for toileting  - Record patient progress and toleration of activity level   Outcome: Progressing     Problem: Potential for Falls  Goal: Patient will remain free of falls  Description: INTERVENTIONS:  - Educate patient/family on patient safety including physical limitations  - Instruct patient to call for assistance with activity   - Consult OT/PT to assist with strengthening/mobility   - Keep Call bell within reach  - Keep bed low and locked with side rails adjusted as appropriate  - Keep care items and personal belongings within reach  - Initiate and maintain comfort rounds  - Make Fall Risk Sign visible to staff  - Offer Toileting every 2 Hours, in advance of need  - Initiate/Maintain alarm  - Obtain necessary fall risk management equipment  - Apply yellow socks and bracelet for high fall risk patients  - Consider moving patient to room near nurses station  Outcome: Progressing     Problem: Prexisting or High Potential for Compromised Skin Integrity  Goal: Skin integrity is maintained or improved  Description: INTERVENTIONS:  - Identify patients at risk for skin breakdown  - Assess and monitor skin integrity  - Assess and monitor nutrition and hydration status  - Monitor labs   - Assess for incontinence   - Turn and reposition patient  - Assist with mobility/ambulation  - Relieve pressure over bony prominences  - Avoid friction and shearing  - Provide appropriate hygiene as needed including keeping skin clean and dry  - Evaluate need for skin moisturizer/barrier cream  - Collaborate with interdisciplinary team   - Patient/family teaching  - Consider wound care consult   Outcome: Progressing     Problem: RESPIRATORY - ADULT  Goal: Achieves optimal ventilation and oxygenation  Description: INTERVENTIONS:  - Assess for changes in respiratory status  - Assess for changes in mentation and behavior  - Position to facilitate oxygenation and minimize respiratory effort  - Oxygen administered by appropriate delivery if ordered  - Initiate smoking cessation education as indicated  - Encourage broncho-pulmonary hygiene including cough, deep breathe, Incentive Spirometry  - Assess the need for suctioning and aspirate as needed  - Assess and instruct to report SOB or any respiratory difficulty  - Respiratory Therapy support as indicated  Outcome: Progressing     Problem: PAIN - ADULT  Goal: Verbalizes/displays adequate comfort level or baseline comfort level  Description: Interventions:  - Encourage patient to monitor pain and request assistance  - Assess pain using appropriate pain scale  - Administer analgesics based on type and severity of pain and evaluate response  - Implement non-pharmacological measures as appropriate and evaluate response  - Consider cultural and social influences on pain and pain management  - Notify physician/advanced practitioner if interventions unsuccessful or patient reports new pain  Outcome: Progressing     Problem: INFECTION - ADULT  Goal: Absence or prevention of progression during hospitalization  Description: INTERVENTIONS:  - Assess and monitor for signs and symptoms of infection  - Monitor lab/diagnostic results  - Monitor all insertion sites, i e  indwelling lines, tubes, and drains  - Monitor endotracheal if appropriate and nasal secretions for changes in amount and color  - [place] appropriate cooling/warming therapies per order  - Administer medications as ordered  - Instruct and encourage patient and family to use good hand hygiene technique  - Identify and instruct in appropriate isolation precautions for identified infection/condition  Outcome: Progressing  Goal: Absence of fever/infection during neutropenic period  Description: INTERVENTIONS:  - Monitor WBC    Outcome: Progressing     Problem: SAFETY ADULT  Goal: Maintain or return to baseline ADL function  Description: INTERVENTIONS:  -  Assess patient's ability to carry out ADLs; assess patient's baseline for ADL function and identify physical deficits which impact ability to perform ADLs (bathing, care of mouth/teeth, toileting, grooming, dressing, etc )  - Assess/evaluate cause of self-care deficits   - Assess range of motion  - Assess patient's mobility; develop plan if impaired  - Assess patient's need for assistive devices and provide as appropriate  - Encourage maximum independence but intervene and supervise when necessary  - Involve family in performance of ADLs  - Assess for home care needs following discharge   - Consider OT consult to assist with ADL evaluation and planning for discharge  - Provide patient education as appropriate  Outcome: Progressing  Goal: Maintains/Returns to pre admission functional level  Description: INTERVENTIONS:  - Perform BMAT or MOVE assessment daily    - Set and communicate daily mobility goal to care team and patient/family/caregiver  - Collaborate with rehabilitation services on mobility goals if consulted  - Perform Range of Motion 3 times a day  - Reposition patient every 2 hours    - Dangle patient 3 times a day  - Stand patient 3 times a day  - Ambulate patient 3 times a day  - Out of bed to chair 3 times a day   - Out of bed for meals 3 times a day  - Out of bed for toileting  - Record patient progress and toleration of activity level   Outcome: Progressing  Goal: Patient will remain free of falls  Description: INTERVENTIONS:  - Educate patient/family on patient safety including physical limitations  - Instruct patient to call for assistance with activity   - Consult OT/PT to assist with strengthening/mobility   - Keep Call bell within reach  - Keep bed low and locked with side rails adjusted as appropriate  - Keep care items and personal belongings within reach  - Initiate and maintain comfort rounds  - Make Fall Risk Sign visible to staff  - Offer Toileting every 2 Hours, in advance of need  - Initiate/Maintain alarm  - Obtain necessary fall risk management equipment  - Apply yellow socks and bracelet for high fall risk patients  - Consider moving patient to room near nurses station  Outcome: Progressing     Problem: DISCHARGE PLANNING  Goal: Discharge to home or other facility with appropriate resources  Description: INTERVENTIONS:  - Identify barriers to discharge w/patient and caregiver  - Arrange for needed discharge resources and transportation as appropriate  - Identify discharge learning needs (meds, wound care, etc )  - Arrange for interpretive services to assist at discharge as needed  - Refer to Case Management Department for coordinating discharge planning if the patient needs post-hospital services based on physician/advanced practitioner order or complex needs related to functional status, cognitive ability, or social support system  Outcome: Progressing     Problem: Knowledge Deficit  Goal: Patient/family/caregiver demonstrates understanding of disease process, treatment plan, medications, and discharge instructions  Description: Complete learning assessment and assess knowledge base  Interventions:  - Provide teaching at level of understanding  - Provide teaching via preferred learning methods  Outcome: Progressing     Problem: Nutrition/Hydration-ADULT  Goal: Nutrient/Hydration intake appropriate for improving, restoring or maintaining nutritional needs  Description: Monitor and assess patient's nutrition/hydration status for malnutrition  Collaborate with interdisciplinary team and initiate plan and interventions as ordered  Monitor patient's weight and dietary intake as ordered or per policy  Utilize nutrition screening tool and intervene as necessary  Determine patient's food preferences and provide high-protein, high-caloric foods as appropriate       INTERVENTIONS:  - Monitor oral intake, urinary output, labs, and treatment plans  - Assess nutrition and hydration status and recommend course of action  - Evaluate amount of meals eaten  - Assist patient with eating if necessary   - Allow adequate time for meals  - Recommend/ encourage appropriate diets, oral nutritional supplements, and vitamin/mineral supplements  - Order, calculate, and assess calorie counts as needed  - Recommend, monitor, and adjust tube feedings and TPN/PPN based on assessed needs  - Assess need for intravenous fluids  - Provide specific nutrition/hydration education as appropriate  - Include patient/family/caregiver in decisions related to nutrition  Outcome: Progressing

## 2022-11-28 NOTE — PROGRESS NOTES
Daily Progress Note - Critical Care   Michelle Singleton Sr  78 y o  male MRN: [id number]  Unit/Bed#: [facility] 201-01 Encounter: [id number]        ----------------------------------------------------------------------------------------  HPI/24hr events:  Rekha is a 42-year-old male with a past medical history of severe COPD, diastolic CHF, atrial fibrillation on Eliquis, lung cancer status post SBRT, type 2 diabetes, stage 3b CKD, GERD, CAD and hypertension who presented to the emergency department 11/20 complaining of worsening shortness of breath   His worsening hypoxic respiratory failure was thought to be secondary to acute CHF exacerbation versus COPD exacerbation versus possible underlying pneumonia   He was placed on high-flow nasal cannula in the emergency department as well as a nitro drip for elevated blood pressure and Lasix   The patient met SIRS criteria and was started on antibiotics and blood cultures were drawn  [facility] was also found to have a new near complete occlusion of the middle lobe on the right lower lobe bronchial branches with atelectasis on CT chest   He remained on high-flow nasal cannula and so he was brought to the ICCU for further monitoring  The ICCU the patient was placed on BiPAP due to worsening hypoxic and hypercapnic respiratory failure   Blood and urine cultures were positive for Pseudomonas and he was continued on cefepime and vancomycin was discontinued   The patient's clinical status slowly improved and he was eventually weaned off of BiPAP and down to 3 L nasal cannula   He was later deemed medically stable for downgrade to med surge      On 11/24 critical care team was alerted to assess patient  Per review of notes from day patient was instructed to be compliant with BiPAP on previous night  Per nursing, patient was not compliant overnight he was found to be increasingly lethargic and the morning of 11/24    Initial ABG showed the patient was in respiratory acidosis with a pH of 7 2-9  Patient had elevated pCO2 of 83 5  Respiratory therapy was contacted and patient was subsequently placed back on BiPAP  Follow-up ABG few hours after initiating BiPAP revealed a pH improved but still acidotic at 7 3  Patient had slightly improved pCO2 is 73 5 at that time as well  Patient had repeat chest x-ray today which is concerning for a possible new left basilar pneumonia, the patient already on antibiotic therapy  After evaluation of critical care team, patient is appropriate for step-down level 1  Patient was acutely obtunded  Patient responded to sternal rub and at that point was able to follow basic commands such as squeezing hands  Patient had not taken any narcotics that day, blood sugars acceptable and without concern for hypoglycemia  No metabolic derangements  Updated level of care, will continue BiPAP for patient overnight  11/25 patient remained on BiPAP overnight  11/26 refused BiPAP overnight  11/27 tolerated BiPAP overnight  11/28: Lower Seroquel qHS dose  Patient tolerated BiPAP overnight    ---------------------------------------------------------------------------------------  SUBJECTIVE  The patient was seen and examined at bedside  He had no acute events overnight  He tolerated BiPAP overnight  Upon my encounter in early morning patient was still sleeping comfortably in bed  Patient awoke to loud voice and denied any discomfort or complaints at that time  Review of Systems   Constitutional: Negative for activity change, appetite change, chills, fatigue and fever  HENT: Negative for congestion, ear discharge, ear pain, hearing loss, sinus pain, sore throat, tinnitus and trouble swallowing  Eyes: Negative for pain and visual disturbance  Respiratory: Negative for cough, chest tightness, shortness of breath and wheezing  Cardiovascular: Negative for chest pain and leg swelling     Gastrointestinal: Negative for abdominal distention, abdominal pain, anal bleeding, blood in stool, constipation, diarrhea, nausea, rectal pain and vomiting  Endocrine: Negative for cold intolerance and heat intolerance  Genitourinary: Negative for difficulty urinating, dysuria and frequency  Musculoskeletal: Negative for arthralgias and myalgias  Skin: Negative for rash  Allergic/Immunologic: Negative for environmental allergies and food allergies  Neurological: Negative for dizziness, light-headedness, numbness and headaches  Hematological: Negative for adenopathy  Psychiatric/Behavioral: Negative for agitation, behavioral problems, confusion and decreased concentration  Lethargic       ---------------------------------------------------------------------------------------  Assessment and Plan:    Neuro:   • Diagnosis:  Toxic metabolic encephalopathy  ? Likely secondary to acute hypoxic, hypercarbic respiratory failure with possible contraction alkalosis  ? Continue BiPAP mask QHS and PRN  ? Monitor ABG   ? Continue delirium precautions  ? CT head negative  ? Cefepime transitioned to ceftazidime in setting of persistent AMS  ? 11/24 patient obtunded, noncompliant with BiPAP overnight     ? 11/25 patient compliant with BiPAP in slowly improving  ? 11/26 patient again refused BiPAP  ? Downgraded to step-down unit  ? Continue Seroquel 12 5 mg  ? Will consider neuropsych evaluation, though patient now agreeing and tolerating BiPAP       CV:   • Diagnosis:  AFib  ? Plan: Continue home Eliquis and carvedilol  ? Continue telemetry monitoring  • Diagnosis:  CAD  ? Plan:  Status post stent placement in 2015  ? Stress test negative in May of this year  ? Continue aspirin, statin, beta-blocker  • Diagnosis:  HTN  ? Plan:  Monitor blood pressure closely  ? Continue carvedilol  ? Continue Bumex  • Diagnosis:  HLD  ? Plan:  Continue statin  • Diagnosis:  Acute on chronic diastolic heart failure  ? Plan:  Continue Bumex  ?  Continue fluid restriction and sodium restriction  ? Monitor I's and O's and daily weights        Pulm:  • Diagnosis:  Pneumonia  ? Plan: CXR showed some concern for pneumonia  ? CT chest earlier in admission showed increase in right pleural effusion with new small left effusion, new near complete occlusion of right middle lobe and right lower lobe bronchial branches with atelectasis and new small perihepatic ascites  ? F/u a m CXR to assess pleural effusion  ? Follow-up blood culture, MRSA culture and urine culture  - Blood and urine culture positive for Pseudomonas   - Cefepime transitioned to ceftazidime in setting of persistent AMS  - Continue ceftazidime 2 g Q 12  - Day 9 of antibiotics   ? Monitor WBC count and fever curve  • Diagnosis:  Lung adenocarcinoma  ? Plan: s/p resection and radiation therapy in the past  • Diagnosis:  Right-sided pleural effusion  • Plan: s/p IR thoracentesis 11/23  • Diagnosis:  COPD  ? Plan:  History of COPD on 2 L NC at baseline  ? Continue nebulizers with Xopenex and Atrovent  - Added Perforomist and budesonide b i d   ? Continue to wean supplemental oxygen as able  • Diagnosis:  Acute on chronic respiratory failure with hypoxia and hypercapnia  ? Plan:  Likely secondary to pneumonia versus CHF exacerbation versus COPD exacerbation  ? Continue plan for pneumonia as above  ? Monitor ABG  ? Continue BIPAP as able, though patient persistently noncompliant overnight  ? Continue Bumex  ? Monitor I's and O's and daily weights  ? Wean supplemental oxygen as able -> currently at baseline on 2 L  ? Monitor respiratory status closely        GI:   • Diagnosis:   no active issues  • continue Protonix b i d         :   • Diagnosis:  Stage IIIB CKD  ? Plan:  Baseline creatinine appears to be from 1 8-2  ? On admission creatinine was at baseline at 2 01  ? Monitor BMP closely  ? Avoid nephrotoxic medications  ? Continue Bumex  ?  Nephrology following        F/E/N:   • Plan:   • F; not on fluids  • E: Monitor daily BMP  • N: Dysphagia 2 diet        Heme/Onc:   • Diagnosis:  Lung adenocarcinoma  ? Plan: s/p resection and radiation therapy in the past        Endo:   • Diagnosis:  Type 2 diabetes mellitus  ? Plan:  Home insulin regimen includes 30 units of long-acting at bedtime and 10 units short-acting with meals  ? Continue insulin Lantus 30 units q h s  with SSI coverage  ? Will add 5 units mealtime insulin t i d  with meals on 11/27 given high blood sugar readings   ? Gabapentin for neuropathy  ? Monitor blood sugars closely  ? Avoid hypoglycemia        ID:   • Diagnosis:  Sepsis  ? Plan:  Patient presented with leukocytosis and tachypnea  ? Sources likely secondary to UTI vs pneumonia  - Blood and urine cultures growing Pseudomonas  ? Cefepime transitioned to ceftazidime given persistent AMS -> continue ceftazidime  ? Day 9 of Antiobiotics  ? Monitor WBC count and fever curve  ?  Case management following regarding possible need for long-term placement -> patient reportedly has a history of hoarding behaviors         MSK/Skin:   ? Diagnosis:  PT/OT consulted  - OOB when able  - Frequent turning and repositioning      Patient appropriate for transfer out of the ICU today?: Yes  Disposition: Transfer to Stepdown Level 1   Code Status: Level 1 - Full Code  ---------------------------------------------------------------------------------------  ICU CORE MEASURES    Prophylaxis   VTE Pharmacologic Prophylaxis: Heparin Drip  VTE Mechanical Prophylaxis: sequential compression device  Stress Ulcer Prophylaxis: Pantoprazole PO      Invasive Devices Review  Invasive Devices     Peripheral Intravenous Line  Duration           Peripheral IV 11/24/22 Distal;Right;Ventral (anterior) Forearm 3 days    Peripheral IV 11/24/22 Left Antecubital 3 days    Peripheral IV 11/24/22 Proximal;Right;Ventral (anterior) Forearm 3 days          Drain  Duration           External Urinary Catheter Small <1 day              Can any invasive devices be discontinued today? Not applicable  ---------------------------------------------------------------------------------------  OBJECTIVE    Vitals   Vitals:    22 1730 22 1930 22 1948 22 2300   BP: 119/58  143/73 105/51   BP Location:   Left arm Left arm   Pulse: 82 74 78 64   Resp:   20 16   Temp:   98 4 °F (36 9 °C) 97 8 °F (36 6 °C)   TempSrc:   Oral Axillary   SpO2: 95% 100% 97% 100%   Weight:       Height:         Temp (24hrs), Av 3 °F (36 8 °C), Min:97 8 °F (36 6 °C), Max:98 6 °F (37 °C)  Current: Temperature: 97 8 °F (36 6 °C)      Respiratory:  SpO2: SpO2: 100 %  Nasal Cannula O2 Flow Rate (L/min): 2 L/min    Invasive/non-invasive ventilation settings   Respiratory    Lab Data (Last 4 hours)    None         O2/Vent Data (Last 4 hours)    None                Physical Exam  Constitutional:       Appearance: He is well-developed  He is obese  HENT:      Head: Normocephalic and atraumatic  Nose: Nose normal    Eyes:      General:         Right eye: No discharge  Left eye: No discharge  Conjunctiva/sclera: Conjunctivae normal       Pupils: Pupils are equal, round, and reactive to light  Neck:      Thyroid: No thyromegaly  Cardiovascular:      Rate and Rhythm: Normal rate  Rhythm irregular  Heart sounds: Normal heart sounds  No murmur heard  No friction rub  No gallop  Pulmonary:      Effort: No respiratory distress  Breath sounds: Normal breath sounds  No stridor  No wheezing or rales  Comments: 2L NC  Chest:      Chest wall: No tenderness  Abdominal:      General: Bowel sounds are normal  There is no distension  Palpations: Abdomen is soft  There is no mass  Tenderness: There is no abdominal tenderness  There is no guarding or rebound  Musculoskeletal:         General: No tenderness or deformity  Right lower leg: No edema  Left lower leg: No edema  Lymphadenopathy:      Cervical: No cervical adenopathy     Skin:     General: Skin is warm and dry  Neurological:      Mental Status: He is alert  Laboratory and Diagnostics:  Results from last 7 days   Lab Units 11/28/22  0455 11/27/22  0518 11/26/22  0504 11/25/22  0809 11/25/22  0449 11/24/22  0607 11/23/22  0451 11/22/22  0521 11/21/22  0636   WBC Thousand/uL 7 43 6 81 6 06 6 55 6 28 8 00 10 58*   < > 17 90*   HEMOGLOBIN g/dL 7 6* 7 8* 7 4* 6 7* 7 1* 7 7* 7 8*   < > 7 5*   HEMATOCRIT % 26 1* 26 2* 26 0* 21 6* 23 7* 27 9* 26 5*   < > 26 0*   PLATELETS Thousands/uL 154 141* 136* 135* 135* 103* 143*   < > 153   NEUTROS PCT %  --  72 75  --  81* 79*  --   --  89*   MONOS PCT %  --  12 11  --  8 11  --   --  7    < > = values in this interval not displayed  Results from last 7 days   Lab Units 11/28/22  0455 11/27/22  0834 11/27/22  0518 11/26/22  0504 11/25/22  0449 11/24/22  2034 11/24/22  1054 11/24/22  0607 11/23/22  0451 11/22/22  0521 11/21/22  0636   SODIUM mmol/L 139  --  135 142 140 140  --  134* 137   < > 138   POTASSIUM mmol/L 4 8 4 5 5 7* 4 3 4 6 4 8 4 7 5 5* 4 5   < > 4 0   CHLORIDE mmol/L 99  --  99 104 105 103  --  105 101   < > 101   CO2 mmol/L 37*  --  31 32 35* 34*  --  21 30   < > 33*   ANION GAP mmol/L 3*  --  5 6 0* 3*  --  8 6   < > 4   BUN mg/dL 52*  --  54* 58* 59* 58*  --  61* 60*   < > 38*   CREATININE mg/dL 2 61*  --  2 41* 2 58* 2 84* 3 04*  --  3 24* 3 26*   < > 2 32*   CALCIUM mg/dL 8 2*  --  8 4 8 3 8 5 8 7  --  8 3 8 4   < > 8 3   GLUCOSE RANDOM mg/dL 175*  --  185* 85 143* 140  --  196* 250*   < > 257*   ALT U/L 9*  --  11*  --  7*  --   --   --   --   --  10*   AST U/L 7  --  48*  --  6  --   --   --   --   --  9   ALK PHOS U/L 59  --  67  --  59  --   --   --   --   --  69   ALBUMIN g/dL 2 1*  --  2 2*  --  2 1*  --   --   --   --   --  2 2*   TOTAL BILIRUBIN mg/dL 0 30  --  0 46  --  0 34  --   --   --   --   --  0 42    < > = values in this interval not displayed       Results from last 7 days   Lab Units 11/28/22  0455 11/27/22  0518 11/26/22  0504 11/25/22  0449 11/24/22  2034 11/24/22  0607 11/22/22  0521   MAGNESIUM mg/dL 2 2 2 5 2 5 2 5 2 6 3 0* 2 5   PHOSPHORUS mg/dL 3 9 3 7 3 5 3 9 4 4* 5 0* 3 8      Results from last 7 days   Lab Units 11/25/22  1414 11/25/22  0809   INR   --  1 19   PTT seconds 40* 33              ABG:  Results from last 7 days   Lab Units 11/25/22  0039   PH ART  7 369   PCO2 ART mm Hg 62 0*   PO2 ART mm Hg 124 1   HCO3 ART mmol/L 35 0*   BASE EXC ART mmol/L 8 5   ABG SOURCE  Radial, Left     VBG:  Results from last 7 days   Lab Units 11/26/22  0504 11/25/22  0039   PH MAYE  7 536*  --    PCO2 MAYE mm Hg 35 7*  --    PO2 MAYE mm Hg 50 9*  --    HCO3 MAYE mmol/L 29 6  --    BASE EXC MAYE mmol/L 6 6  --    ABG SOURCE   --  Radial, Left     Results from last 7 days   Lab Units 11/25/22  0449 11/23/22  0451 11/21/22  0636   PROCALCITONIN ng/ml 0 64* 1 37* 2 67*       Micro  Results from last 7 days   Lab Units 11/23/22  1804 11/22/22  1700   BLOOD CULTURE   --  No Growth After 5 Days  No Growth After 5 Days  GRAM STAIN RESULT  1+ Polys  --    BODY FLUID CULTURE, STERILE  No growth  --        Imaging: I have personally reviewed pertinent reports  Intake and Output  I/O       11/23 0701  11/24 0700 11/24 0701  11/25 0700 11/25 0701  11/26 0700    P  O  180 0     I V  (mL/kg)  30 (0 3) 55 2 (0 5)    IV Piggyback 150 100     Total Intake(mL/kg) 330 (3) 130 (1 3) 55 2 (0 5)    Urine (mL/kg/hr) 1325 (0 5) 2875 (1 2) 950 (1 1)    Other 900      Total Output 2225 2875 950    Net -1895 -2745 -894 8                 Height and Weights   Height: 6' (182 9 cm)  IBW (Ideal Body Weight): 77 6 kg  Body mass index is 29 46 kg/m²  Weight (last 2 days)     Date/Time Weight    11/27/22 0600 98 5 (217 2)    11/26/22 0600 104 (229 4)            Nutrition       Diet Orders   (From admission, onward)             Start     Ordered    11/25/22 1535  Diet Dysphagia/Modified Consistency; Dysphagia 2-Mechanical Soft;  Thin Liquid  Diet effective now References:    Nutrtion Support Algorithm Enteral vs  Parenteral   Question Answer Comment   Diet Type Dysphagia/Modified Consistency    Dysphagia/Modified Consistency Dysphagia 2-Mechanical Soft    Liquid Modifier Thin Liquid    RD to adjust diet per protocol?  Yes        11/25/22 1535                  Active Medications  Scheduled Meds:  Current Facility-Administered Medications   Medication Dose Route Frequency Provider Last Rate   • apixaban  5 mg Oral BID Macho Rasheed MD     • aspirin  81 mg Oral Daily Macho Rasheed MD     • budesonide  0 5 mg Nebulization Q12H Macho Rasheed MD     • bumetanide  1 mg Intravenous BID Tad Owens MD     • carvedilol  6 25 mg Oral BID With Meals Macho Rasheed MD     • cefTAZidime  2,000 mg Intravenous Q12H Pablito Etienne MD 2,000 mg (11/28/22 0152)   • fluticasone  1 puff Inhalation Q12H [facility] Macho Rasheed MD     • formoterol  20 mcg Nebulization Q12H Macho Rasheed MD     • gabapentin  100 mg Oral TID Macho Rasheed MD     • insulin glargine  30 Units Subcutaneous HS Macho Rasheed MD     • insulin lispro  1-6 Units Subcutaneous TID AC Jena Mendes, DO     • insulin lispro  5 Units Subcutaneous TID With Meals Jena Mendes, DO     • ipratropium  0 5 mg Nebulization Q6H Macho Rasheed MD     • isosorbide mononitrate  30 mg Oral Daily Macho Rasheed MD     • levalbuterol  1 25 mg Nebulization Q6H Macho Rasheed MD     • ondansetron  4 mg Intravenous Q6H PRN Macho Rasheed MD     • pantoprazole  40 mg Oral BID AC Macho Rasheed MD     • pravastatin  80 mg Oral Daily With Jimbo Desai MD     • QUEtiapine  12 5 mg Oral HS Macho Rasheed MD     • senna-docusate sodium  1 tablet Oral HS Macho Rasheed MD     • tamsulosin  0 4 mg Oral Daily With Jimbo Desai MD       Continuous Infusions:     PRN Meds:   ondansetron, 4 mg, Q6H PRN        Allergies   Allergies   Allergen Reactions   • Crestor [Rosuvastatin] Other (See Comments)     Unknown     • Lisinopril GI Intolerance, Dizziness and Abdominal Pain   • Metformin GI Intolerance     ---------------------------------------------------------------------------------------  Advance Directive and Living Will:      Power of :    POLST:    ---------------------------------------------------------------------------------------  Care Time Delivered:   Upon my evaluation, this patient had a high probability of imminent or life-threatening deterioration due to Acute respiratory failure, which required my direct attention, intervention, and personal management  I have personally provided 60 minutes of critical care time, exclusive of procedures, teaching, family meetings, and any prior time recorded by providers other than myself  Irma Delarosa,       Portions of the record may have been created with voice recognition software  Occasional wrong word or "sound a like" substitutions may have occurred due to the inherent limitations of voice recognition software    Read the chart carefully and recognize, using context, where substitutions have occurred

## 2022-11-28 NOTE — TELEPHONE ENCOUNTER
Foreign Coyne from [facility] called to find out status of the face to face form that was sent on 11/25  Foreign Coyne was informed Dr Nora Pascal will be in today and it will be signed today      Foreign Coyne # [phone]

## 2022-11-28 NOTE — PROGRESS NOTES
ICU TRANSFER NOTE     Name: Drew Nelson    Age & Sex: 78 y o  male   MRN: [id number]  [facility]   Encounter: [address] [facility] [address] Stay Days: 8    Accepting team: SLIM  Code Status: Level 1 - Full Code  Disposition: Patient requires Med/Surg    ASSESSMENT/PLAN     Principal Problem:    Acute on chronic respiratory failure with hypoxia and hypercapnia ([facility])  Active Problems:    Acute on chronic diastolic heart failure (HCC)    Pneumonia    Sepsis ([address] )    Pleural effusion on right    Type 2 diabetes mellitus with hyperglycemia, with long-term current use of insulin ([facility])    CAD (coronary artery disease)    HLD (hyperlipidemia)    COPD (chronic obstructive pulmonary disease) ([facility])    Essential hypertension    Adenocarcinoma of lung ([facility])    Stage 3b chronic kidney disease (CKD) ([facility])    A-fib ([facility])    Anemia      * Acute on chronic respiratory failure with hypoxia and hypercapnia ([facility])  Assessment & Plan  · Multifactorial in the setting of CHF exacerbation, possible pneumonia, history severe COPD suspected JACQUELINE/OHS, Lung CA s/p SBRT  · Patient on 2L NC at baseline  · S/p HFNC and BIPAP  · Currently saturating well on 3L NC  · Continue Bipap HS  · Started on Cefepime and Vancomyin in the ED for presumed pneumonia  · CT imaging of his chest showed new near complete occlusion of middle lobe and right lower lobe bronchial branches with atelectasis   · Blood in urine cultures growing Pseudomonas -> vancomycin DC'd and continued on cefepime -> cefepime later transitioned to ceftazidime in setting of persistent AMS  · Continue nebulizers   · Patient receiving Seroquel 12 5 mg qHS and tolerating BiPAP  Continue to encourage BiPAP use at night  · Concern for patient's competency to make informed decision making as he has refused BiPAP in past despite it being life threatening  Neuropsych consult has been placed       Acute on chronic diastolic heart failure (HCC)  Assessment & Plan  Wt Readings from Last 3 Encounters:   11/26/22 104 kg (229 lb 6 4 oz)   11/09/22 103 kg (227 lb 3 2 oz)   11/01/22 97 4 kg (214 lb 11 7 oz)       · Patient with documented dHF with pEF  · Last echocardiogram from 5/22: Left ventricular cavity size is normal  Wall thickness is increased  The left ventricular ejection fraction is 60%  Systolic function is normal   Wall motion is normal  Diastolic function is normal   · Continue Bumex 1 mg BID, likely will transition to oral  · Monitor I's and O's and daily weights  · Continue to monitor on telemetry       Pneumonia  Assessment & Plan  · CT imaging of his chest showed a new near complete occlusion of middle lobe and right lower lobe bronchial branches with atelectasis   · Blood in urine cultures positive for Pseudomonas  · Repeat blood cultures negative  · Started on Cefepime and Vancomycin in the ED -> now patient on ceftazidime  · ID following  · Trend WBC count and fever curve    Sepsis ([address] )  Assessment & Plan  · Likely secondary to pneumonia vs UTI  · Blood in urine cultures positive for Pseudomonas  · Repeat blood culture showed no growth  · Cefepime was transition to ceftazidime in setting of persistent AMS  · ID following  · Trend WBC count and fever curve    Pleural effusion on right  Assessment & Plan  · Patient with documented chronic recurrent pleural effusion on the right   · S/p Thoracentesis on 11/08/22  · Cytology: Minute cluster of atypical cells noted in cellblock material only  · Atypical cluster is favored to represent contamination during specimen processing; however, given the patient's history of lung adenocarcinoma, involvement by malignancy cannot be definitively ruled out     · Consider repeat thoracentesis with cytology     Type 2 diabetes mellitus with hyperglycemia, with long-term current use of insulin [facility])  Assessment & Plan  Lab Results   Component Value Date    HGBA1C 9 7 (H) 10/10/2022       Recent Labs     11/26/22  0522 11/26/22  0756 11/26/22  1123 11/26/22  1617   POCGLU 65 142* 248* 196*       Blood Sugar Average: Last 72 hrs:  (P) 145 [id number]     · Continue lantus 30 units HS (at home patient takes 30 units HS)  · Continue mealtime insulin 5 units t i d   With meals  · ISS  · Goal -180    CAD (coronary artery disease)  Assessment & Plan  · History of CAD with stenting in 2015  · Stent to D1, mid RCA and distal RCA  · Continue GDMT  · ASA  · Coreg  · Statin    HLD (hyperlipidemia)  Assessment & Plan  · Continue home statin therapy     Anemia  Assessment & Plan  · Likely secondary to CKD verses iron deficiency anemia  · Follow-up iron studies  · Consider restarting iron supplementation following iron study results    A-fib ([place] [address] )  Assessment & Plan  · Continue home Eliquis for [facility] [place]  · Continue home coreg   · Monitor on telemetry     Stage 3b chronic kidney disease (CKD) ([place] [address] )  Assessment & Plan  Lab Results   Component Value Date    EGFR 23 11/26/2022    EGFR 20 11/25/2022    EGFR 19 11/24/2022    CREATININE 2 58 (H) 11/26/2022    CREATININE 2 84 (H) 11/25/2022    CREATININE 3 04 (H) 11/24/2022     · Baseline creatine 1 8-2  · Creatinine on admission 2 01  · Continue to trend creatine  · Avoid nephrotoxic medications    Adenocarcinoma of lung ([facility] )  Assessment & Plan  · Patient with history of adenocarcinoma status post resection and radiation therapy    Essential hypertension  Assessment & Plan  · Continue home coreg and imdur  · Home Lasix currently on hold   · Monitor blood pressure    COPD (chronic obstructive pulmonary disease) (HCC)  Assessment & Plan  · Severe COPD FEV1 33%  · Patient on chronic supplemental oxygen 2L NC  · Continue Flovent  · Continue Xopenex and atrovent Q6  · Continue budesonide and Permafrost      VTE Pharmacologic Prophylaxis: Reason for no pharmacologic prophylaxis on Eliquis  VTE Mechanical Prophylaxis: sequential compression device    HOSPITAL COURSE     Patient is a 79-year-old male with a past medical history of severe COPD, diastolic CHF, atrial fibrillation on Eliquis, lung cancer status post SBRT, type 2 diabetes, stage 3b CKD, GERD, CAD and hypertension who presented to the emergency department 11/20 complaining of worsening shortness of breath   His worsening hypoxic respiratory failure was thought to be secondary to acute CHF exacerbation versus COPD exacerbation versus possible underlying pneumonia   He was placed on high-flow nasal cannula in the emergency department as well as a nitro drip for elevated blood pressure and Lasix   The patient met SIRS criteria and was started on antibiotics and blood cultures were drawn  Romaine Thorpe was also found to have a new near complete occlusion of the middle lobe on the right lower lobe bronchial branches with atelectasis on CT chest   He remained on high-flow nasal cannula and so he was brought to the ICCU for further monitoring  The ICCU the patient was placed on BiPAP due to worsening hypoxic and hypercapnic respiratory failure   Blood and urine cultures were positive for Pseudomonas and he was continued on cefepime and vancomycin was discontinued   The patient's clinical status slowly improved and he was eventually weaned off of BiPAP and down to 3 L nasal cannula   He was later deemed medically stable for downgrade to med surge      On 11/24 critical care team was alerted to assess patient  Per review of notes from day patient was instructed to be compliant with BiPAP on previous night  Per nursing, patient was not compliant overnight he was found to be increasingly lethargic and the morning of 11/24  Initial ABG showed the patient was in respiratory acidosis with a pH of 7 2-9  Patient had elevated pCO2 of 83 5  Respiratory therapy was contacted and patient was subsequently placed back on BiPAP  Follow-up ABG few hours after initiating BiPAP revealed a pH improved but still acidotic at 7 3  Patient had slightly improved pCO2 is 73 5 at that time as well    Patient had repeat chest x-ray today which is concerning for a possible new left basilar pneumonia, the patient already on antibiotic therapy  After evaluation of critical care team, patient is appropriate for step-down level 1  Patient was acutely obtunded  Patient responded to sternal rub and at that point was able to follow basic commands such as squeezing hands  Perfect patient had not taken any narcotics today, blood sugars acceptable and without concern for hypoglycemia  No metabolic derangements  Updated level of care, will continue BiPAP for patient overnight  Patient has now been compliant with BiPAP overnight for the past 2 nights  Two nights ago patient was trialed on Seroquel 25 mg q h s     Patient slept well and tolerated BiPAP overnight with Seroquel addition  However, the next morning patient was still lethargic  Thought to be due to Seroquel  Seroquel was decreased to 12 5 mg q h s  And this was given to patient last night  Patient slept well and again tolerated BiPAP overnight and seemed to be less lethargic in a m  At this dose  Patient still requires baseline of 2 L nasal cannula during day  There is concern for patient's competency in his ability to make informed medical decisions for himself  Because of this, a neuropsych consultation has been placed  Patient has remained hemodynamically stable now that he is compliant with BiPAP  Patient stable for transfer to [facility] floor    SUBJECTIVE     Upon my encounter patient appeared comfortable resting in hospital bed  No apparent distress  No acute complaints  OBJECTIVE     Vitals:    11/28/22 0900 11/28/22 1100 11/28/22 1115 11/28/22 1324   BP:  102/58     BP Location:       Pulse:  72 68    Resp:       Temp:   98 3 °F (36 8 °C)    TempSrc:   Oral    SpO2:  100% 100% 99%   Weight: 102 kg (224 lb 6 9 oz)      Height:         I/O last 24 hours: In: 18 [P O :1480]  Out: 4279 [OLUNC:9128]    LABORATORY DATA     Labs:  I have personally reviewed pertinent reports  Results from last 7 days   Lab Units 11/28/22  0455 11/27/22  0518 11/26/22  0504 11/25/22  0809 11/25/22  0449   WBC Thousand/uL 7 43 6 81 6 06   < > 6 28   HEMOGLOBIN g/dL 7 6* 7 8* 7 4*   < > 7 1*   HEMATOCRIT % 26 1* 26 2* 26 0*   < > 23 7*   PLATELETS Thousands/uL 154 141* 136*   < > 135*   NEUTROS PCT %  --  72 75  --  81*   MONOS PCT %  --  12 11  --  8    < > = values in this interval not displayed  Results from last 7 days   Lab Units 11/28/22  0455 11/27/22  0834 11/27/22  0518 11/26/22  0504 11/25/22  0449   POTASSIUM mmol/L 4 8 4 5 5 7* 4 3 4 6   CHLORIDE mmol/L 99  --  99 104 105   CO2 mmol/L 37*  --  31 32 35*   BUN mg/dL 52*  --  54* 58* 59*   CREATININE mg/dL 2 61*  --  2 41* 2 58* 2 84*   CALCIUM mg/dL 8 2*  --  8 4 8 3 8 5   ALK PHOS U/L 59  --  67  --  59   ALT U/L 9*  --  11*  --  7*   AST U/L 7  --  48*  --  6     Results from last 7 days   Lab Units 11/28/22  0455 11/27/22  0518 11/26/22  0504   MAGNESIUM mg/dL 2 2 2 5 2 5     Results from last 7 days   Lab Units 11/28/22  0455 11/27/22  0518 11/26/22  0504   PHOSPHORUS mg/dL 3 9 3 7 3 5      Results from last 7 days   Lab Units 11/25/22  1414 11/25/22  0809   INR   --  1 19   PTT seconds 40* 33             Micro:  Lab Results   Component Value Date    BLOODCX No Growth After 5 Days  11/22/2022    BLOODCX No Growth After 5 Days  11/22/2022    BLOODCX Pseudomonas aeruginosa (A) 11/20/2022    URINECX 60,000-69,000 cfu/ml Pseudomonas aeruginosa (A) 11/20/2022    URINECX >100,000 cfu/ml Klebsiella variicola (A) 10/26/2022    URINECX >100,000 cfu/ml Pseudomonas aeruginosa (A) 06/15/2022    SPUTUMCULTUR Few Colonies of Pseudomonas aeruginosa (A) 10/26/2022    SPUTUMCULTUR 1+ Growth of 10/26/2022    SPUTUMCULTUR 3+ Growth of 06/11/2022     IMAGING & DIAGNOSTIC TESTING     Imaging: I have personally reviewed pertinent reports  XR chest portable    Result Date: 11/24/2022  Impression: 1    New rounded cavity in the left base raises concern for developing pneumonia  Otherwise, negative findings in the chest  The study was marked in [facility] for immediate notification  Workstation performed: [id number]     XR chest 1 view portable    Result Date: 11/20/2022  Impression: Increased size right pleural effusion    Workstation performed: [id number]     CT head wo contrast    Result Date: 11/24/2022  Impression: No acute intracranial abnormality  Chronic microangiopathic changes  Workstation performed: [id number]     CT chest wo contrast    Result Date: 11/20/2022  Impression: 1  Increase in moderate right pleural effusion with new small left effusion  2   New near complete occlusion of middle lobe and right lower lobe bronchial branches with atelectasis  3  New small perihepatic ascites  The study was marked in [facility] for immediate notification  Workstation performed: [id number]     IR IN-Patient Thoracentesis    Result Date: 11/23/2022  Impression: Impression: 1  Successful ultrasound-guided thoracentesis yielding 900 mL of serosanguineous pleural fluid  Workstation performed: [id number]     XR chest portable ICU    Result Date: 11/25/2022  Impression: Unchanged moderate right pleural effusion, and right hemithorax volume loss with right basilar atelectasis  Workstation performed: [id number]     XR chest portable ICU    Result Date: 11/21/2022  Impression: Moderate right pleural effusion with associated right lung base opacity  Workstation performed: [id number]     US kidney and bladder    Result Date: 11/25/2022  Impression: No evidence of obstruction  Ascites  Cholelithiasis  Workstation performed: [id number]     EKG, Pathology, and Other Studies: I have personally reviewed pertinent reports       ALLERGIES     Allergies   Allergen Reactions   • Crestor [Rosuvastatin] Other (See Comments)     Unknown     • Lisinopril GI Intolerance, Dizziness and Abdominal Pain   • Metformin GI Intolerance     MEDICATIONS     Current Facility-Administered Medications   Medication Dose Route Frequency Provider Last Rate   • apixaban  5 mg Oral BID Daria Coon MD     • aspirin  81 mg Oral Daily Daria Coon MD     • budesonide  0 5 mg Nebulization Q12H Daria Coon MD     • bumetanide  1 mg Intravenous BID Thanh Cloud MD     • carvedilol  6 25 mg Oral BID With Meals Daria Coon MD     • cefTAZidime  2,000 mg Intravenous Q12H Rosario Gonzales MD 2,000 mg (11/28/22 1320)   • fluticasone  1 puff Inhalation Q12H [address] Daria Coon MD     • formoterol  20 mcg Nebulization Q12H Daria Coon MD     • gabapentin  100 mg Oral TID Daria Coon MD     • insulin glargine  30 Units Subcutaneous HS Daria Coon MD     • insulin lispro  1-6 Units Subcutaneous TID AC Valentina Dickens DO     • insulin lispro  5 Units Subcutaneous TID With Meals Valentina Dickens DO     • ipratropium  0 5 mg Nebulization Q6H Daria Coon MD     • isosorbide mononitrate  30 mg Oral Daily Daria Coon MD     • levalbuterol  1 25 mg Nebulization Q6H Daria Coon MD     • ondansetron  4 mg Intravenous Q6H PRN Daria Coon MD     • pantoprazole  40 mg Oral BID AC Daria Coon MD     • pravastatin  80 mg Oral Daily With Allison Chavis MD     • QUEtiapine  12 5 mg Oral HS Daria Coon MD     • senna-docusate sodium  1 tablet Oral HS Daria Coon MD     • tamsulosin  0 4 mg Oral Daily With Allison Chavis MD          ondansetron, 4 mg, Q6H PRN        Portions of the record may have been created with voice recognition software  Occasional wrong word or "sound a like" substitutions may have occurred due to the inherent limitations of voice recognition software    Read the chart carefully and recognize, using context, where substitutions have occurred     ==  Valentina Dickens DO  [address]  Internal Medicine Residency PGY-2

## 2022-11-29 ENCOUNTER — TELEPHONE (OUTPATIENT)
Dept: FAMILY MEDICINE CLINIC | Facility: CLINIC | Age: 77
End: 2022-11-29

## 2022-11-29 PROBLEM — B96.5 BACTEREMIA DUE TO PSEUDOMONAS: Status: ACTIVE | Noted: 2022-11-29

## 2022-11-29 PROBLEM — R78.81 BACTEREMIA DUE TO PSEUDOMONAS: Status: ACTIVE | Noted: 2022-11-29

## 2022-11-29 PROBLEM — N17.9 ACUTE KIDNEY INJURY SUPERIMPOSED ON CHRONIC KIDNEY DISEASE (HCC): Status: ACTIVE | Noted: 2022-01-29

## 2022-11-29 PROBLEM — N18.9 ACUTE KIDNEY INJURY SUPERIMPOSED ON CHRONIC KIDNEY DISEASE (HCC): Status: ACTIVE | Noted: 2022-01-29

## 2022-11-29 PROBLEM — R41.9 NEUROCOGNITIVE DISORDER: Status: ACTIVE | Noted: 2022-11-29

## 2022-11-29 LAB
ALBUMIN SERPL BCP-MCNC: 2 G/DL (ref 3.5–5)
ALP SERPL-CCNC: 59 U/L (ref 46–116)
ALT SERPL W P-5'-P-CCNC: 9 U/L (ref 12–78)
ANION GAP SERPL CALCULATED.3IONS-SCNC: 4 MMOL/L (ref 4–13)
AST SERPL W P-5'-P-CCNC: 6 U/L (ref 5–45)
BILIRUB SERPL-MCNC: 0.31 MG/DL (ref 0.2–1)
BUN SERPL-MCNC: 51 MG/DL (ref 5–25)
CALCIUM ALBUM COR SERPL-MCNC: 10 MG/DL (ref 8.3–10.1)
CALCIUM SERPL-MCNC: 8.4 MG/DL (ref 8.3–10.1)
CHLORIDE SERPL-SCNC: 98 MMOL/L (ref 96–108)
CO2 SERPL-SCNC: 37 MMOL/L (ref 21–32)
CREAT SERPL-MCNC: 2.61 MG/DL (ref 0.6–1.3)
ERYTHROCYTE [DISTWIDTH] IN BLOOD BY AUTOMATED COUNT: 15.1 % (ref 11.6–15.1)
GFR SERPL CREATININE-BSD FRML MDRD: 22 ML/MIN/1.73SQ M
GLUCOSE SERPL-MCNC: 167 MG/DL (ref 65–140)
GLUCOSE SERPL-MCNC: 169 MG/DL (ref 65–140)
GLUCOSE SERPL-MCNC: 215 MG/DL (ref 65–140)
GLUCOSE SERPL-MCNC: 223 MG/DL (ref 65–140)
GLUCOSE SERPL-MCNC: 342 MG/DL (ref 65–140)
HCT VFR BLD AUTO: 26 % (ref 36.5–49.3)
HGB BLD-MCNC: 7.4 G/DL (ref 12–17)
MAGNESIUM SERPL-MCNC: 2.4 MG/DL (ref 1.6–2.6)
MCH RBC QN AUTO: 24.7 PG (ref 26.8–34.3)
MCHC RBC AUTO-ENTMCNC: 28.5 G/DL (ref 31.4–37.4)
MCV RBC AUTO: 87 FL (ref 82–98)
PHOSPHATE SERPL-MCNC: 4.1 MG/DL (ref 2.3–4.1)
PLATELET # BLD AUTO: 153 THOUSANDS/UL (ref 149–390)
PMV BLD AUTO: 10.5 FL (ref 8.9–12.7)
POTASSIUM SERPL-SCNC: 4.6 MMOL/L (ref 3.5–5.3)
PROT SERPL-MCNC: 6.5 G/DL (ref 6.4–8.4)
RBC # BLD AUTO: 2.99 MILLION/UL (ref 3.88–5.62)
SODIUM SERPL-SCNC: 139 MMOL/L (ref 135–147)
WBC # BLD AUTO: 7.35 THOUSAND/UL (ref 4.31–10.16)

## 2022-11-29 RX ORDER — ACETAMINOPHEN 325 MG/1
650 TABLET ORAL EVERY 6 HOURS PRN
Status: DISCONTINUED | OUTPATIENT
Start: 2022-11-29 | End: 2022-12-02 | Stop reason: HOSPADM

## 2022-11-29 RX ADMIN — QUETIAPINE FUMARATE 12.5 MG: 25 TABLET ORAL at 21:33

## 2022-11-29 RX ADMIN — INSULIN LISPRO 2 UNITS: 100 INJECTION, SOLUTION INTRAVENOUS; SUBCUTANEOUS at 12:20

## 2022-11-29 RX ADMIN — PANTOPRAZOLE SODIUM 40 MG: 40 TABLET, DELAYED RELEASE ORAL at 06:10

## 2022-11-29 RX ADMIN — LEVALBUTEROL HYDROCHLORIDE 1.25 MG: 1.25 SOLUTION, CONCENTRATE RESPIRATORY (INHALATION) at 07:20

## 2022-11-29 RX ADMIN — LEVALBUTEROL HYDROCHLORIDE 1.25 MG: 1.25 SOLUTION, CONCENTRATE RESPIRATORY (INHALATION) at 01:04

## 2022-11-29 RX ADMIN — CARVEDILOL 6.25 MG: 6.25 TABLET, FILM COATED ORAL at 08:44

## 2022-11-29 RX ADMIN — IPRATROPIUM BROMIDE 0.5 MG: 0.5 SOLUTION RESPIRATORY (INHALATION) at 07:20

## 2022-11-29 RX ADMIN — APIXABAN 5 MG: 5 TABLET, FILM COATED ORAL at 17:21

## 2022-11-29 RX ADMIN — CEFTAZIDIME 2000 MG: 1 INJECTION, POWDER, FOR SOLUTION INTRAMUSCULAR; INTRAVENOUS at 12:13

## 2022-11-29 RX ADMIN — INSULIN LISPRO 1 UNITS: 100 INJECTION, SOLUTION INTRAVENOUS; SUBCUTANEOUS at 06:10

## 2022-11-29 RX ADMIN — INSULIN LISPRO 5 UNITS: 100 INJECTION, SOLUTION INTRAVENOUS; SUBCUTANEOUS at 16:44

## 2022-11-29 RX ADMIN — IPRATROPIUM BROMIDE 0.5 MG: 0.5 SOLUTION RESPIRATORY (INHALATION) at 01:04

## 2022-11-29 RX ADMIN — BUMETANIDE 1 MG: 0.25 INJECTION, SOLUTION INTRAMUSCULAR; INTRAVENOUS at 17:21

## 2022-11-29 RX ADMIN — INSULIN LISPRO 5 UNITS: 100 INJECTION, SOLUTION INTRAVENOUS; SUBCUTANEOUS at 12:21

## 2022-11-29 RX ADMIN — FORMOTEROL FUMARATE DIHYDRATE 20 MCG: 20 SOLUTION RESPIRATORY (INHALATION) at 07:20

## 2022-11-29 RX ADMIN — BUDESONIDE 0.5 MG: 0.5 INHALANT ORAL at 07:20

## 2022-11-29 RX ADMIN — ASPIRIN 81 MG: 81 TABLET, COATED ORAL at 08:44

## 2022-11-29 RX ADMIN — APIXABAN 5 MG: 5 TABLET, FILM COATED ORAL at 08:45

## 2022-11-29 RX ADMIN — LEVALBUTEROL HYDROCHLORIDE 1.25 MG: 1.25 SOLUTION, CONCENTRATE RESPIRATORY (INHALATION) at 14:24

## 2022-11-29 RX ADMIN — BUMETANIDE 1 MG: 0.25 INJECTION, SOLUTION INTRAMUSCULAR; INTRAVENOUS at 08:47

## 2022-11-29 RX ADMIN — INSULIN LISPRO 5 UNITS: 100 INJECTION, SOLUTION INTRAVENOUS; SUBCUTANEOUS at 16:43

## 2022-11-29 RX ADMIN — CEFTAZIDIME 2000 MG: 1 INJECTION, POWDER, FOR SOLUTION INTRAMUSCULAR; INTRAVENOUS at 00:24

## 2022-11-29 RX ADMIN — INSULIN GLARGINE 30 UNITS: 100 INJECTION, SOLUTION SUBCUTANEOUS at 21:33

## 2022-11-29 RX ADMIN — INSULIN LISPRO 5 UNITS: 100 INJECTION, SOLUTION INTRAVENOUS; SUBCUTANEOUS at 08:44

## 2022-11-29 RX ADMIN — SENNOSIDES AND DOCUSATE SODIUM 1 TABLET: 8.6; 5 TABLET ORAL at 21:33

## 2022-11-29 RX ADMIN — GABAPENTIN 100 MG: 100 CAPSULE ORAL at 08:45

## 2022-11-29 RX ADMIN — PRAVASTATIN SODIUM 80 MG: 80 TABLET ORAL at 16:41

## 2022-11-29 RX ADMIN — PANTOPRAZOLE SODIUM 40 MG: 40 TABLET, DELAYED RELEASE ORAL at 16:41

## 2022-11-29 RX ADMIN — IPRATROPIUM BROMIDE 0.5 MG: 0.5 SOLUTION RESPIRATORY (INHALATION) at 14:24

## 2022-11-29 RX ADMIN — CEFTAZIDIME 2000 MG: 1 INJECTION, POWDER, FOR SOLUTION INTRAMUSCULAR; INTRAVENOUS at 23:59

## 2022-11-29 RX ADMIN — GABAPENTIN 100 MG: 100 CAPSULE ORAL at 16:43

## 2022-11-29 RX ADMIN — CARVEDILOL 6.25 MG: 6.25 TABLET, FILM COATED ORAL at 16:41

## 2022-11-29 RX ADMIN — FLUTICASONE PROPIONATE 1 PUFF: 110 AEROSOL, METERED RESPIRATORY (INHALATION) at 08:46

## 2022-11-29 RX ADMIN — ISOSORBIDE MONONITRATE 30 MG: 30 TABLET, EXTENDED RELEASE ORAL at 08:44

## 2022-11-29 RX ADMIN — GABAPENTIN 100 MG: 100 CAPSULE ORAL at 21:33

## 2022-11-29 RX ADMIN — TAMSULOSIN HYDROCHLORIDE 0.4 MG: 0.4 CAPSULE ORAL at 16:42

## 2022-11-29 RX ADMIN — FLUTICASONE PROPIONATE 1 PUFF: 110 AEROSOL, METERED RESPIRATORY (INHALATION) at 21:36

## 2022-11-29 NOTE — ASSESSMENT & PLAN NOTE
Lab Results   Component Value Date    HGBA1C 9 7 (H) 10/10/2022       Recent Labs     11/28/22  1714 11/28/22 2058 11/29/22  0604 11/29/22  1128   POCGLU 145* 219* 167* 223*       Blood Sugar Average: Last 72 hrs:  (P) 238 [id number]     Home regimen includes 30 units at bedtime, 10 units with meals  Will continue 30 units Lantus at bedtime, 5 units with meals, still continue sliding scale

## 2022-11-29 NOTE — PROGRESS NOTES
-- Patient: Ankur Yuan  -- MRN: [id number]  -- Aidin Request ID: [id number]  -- Level of care reserved: [facility]  -- Partner Reserved: [facility], [facility], [facility] [address] [phone]  -- Clinical needs requested:  -- Geography searched: 10 miles around [facility]  -- Start of Service:  -- Request sent: 2:59pm EST on 11/22/2022 by Lavinia Lagos  -- Partner reserved: 3:46pm EST on 11/29/2022 by Lavinia Lagos  -- Choice list shared: 4:11pm EST on 11/25/2022 by Lavinia Lagos

## 2022-11-29 NOTE — PHYSICAL THERAPY NOTE
PHYSICAL THERAPY NOTE          Patient Name: Ranjan Rivers  Today's Date: 11/29/2022 11/29/22 1031   PT Last Visit   PT Visit Date 11/29/22   Note Type   Note Type Treatment   Pain Assessment   Pain Assessment Tool 0-10   Pain Score No Pain   Hospital Pain Intervention(s) Repositioned; Ambulation/increased activity; Emotional support   Restrictions/Precautions   Weight Bearing Precautions Per Order No   Other Precautions Cognitive; Chair Alarm;Multiple lines;Telemetry;O2;Fall Risk   General   Chart Reviewed Yes   Response to Previous Treatment Patient with no complaints from previous session  Cognition   Overall Cognitive Status Impaired   Arousal/Participation Cooperative   Attention Attends with cues to redirect   Orientation Level Oriented to person;Oriented to place; Disoriented to time;Disoriented to situation   Memory Decreased recall of recent events   Following Commands Follows one step commands with increased time or repetition   Bed Mobility   Supine to Sit 5  Supervision   Additional items Increased time required;Verbal cues   Sit to Supine Unable to assess   Additional Comments Pt sat EOB with fair trunk/postural control  Transfers   Sit to Stand 4  Minimal assistance   Additional items Assist x 1; Increased time required;Verbal cues   Stand to Sit 4  Minimal assistance   Additional items Assist x 1; Increased time required;Verbal cues   Stand pivot 4  Minimal assistance   Additional items Assist x 1; Increased time required;Verbal cues   Additional Comments none used, but would benefit from RW despite pt refusal    Ambulation/Elevation   Gait pattern Improper Weight shift; Antalgic; Forward Flexion; Wide OBEY; Decreased foot clearance;Shuffling;Excessively slow   Gait Assistance 4  Minimal assist   Additional items Assist x 1;Verbal cues   Assistive Device   (none used)   Distance 4ft   Balance   Static Sitting Fair Dynamic Sitting Fair -   Static Standing Poor +   Dynamic Standing Poor +   Ambulatory Poor +   Endurance Deficit   Endurance Deficit Yes   Endurance Deficit Description Pt on 2L NC at baseline  Activity Tolerance   Activity Tolerance Patient limited by fatigue   Medical Staff Made Aware PT CI, OT, nsg   Nurse Made Aware yes   Exercises   Balance training  pt sat EOB and performed static balance ~5-10 min   Assessment   Prognosis Fair   Problem List Decreased strength;Decreased endurance; Impaired balance;Decreased mobility; Decreased cognition;Decreased safety awareness;Decreased coordination; Impaired judgement   Assessment Pt supine in bed upon arrival  Pt tx session focused on mobility, balance, and LE strengthening  Pt continues to require Flory for transfers and ambulation  Pt denied usage of RW but would benefit from AD to address balance, safety, and strength deficits  Pt requires extensive VC for proper transfers  Pt unable to ambulate previous session distance 2* endurance deficit  Pt able sit with fair trunk/postural control EOB and in chair, where session ended  Pt had all needs within reach  Performed at least 2 patient identifiers during session: Name and Melida Constantino The patient's AM-PAC Basic Mobility Inpatient Short Form Raw Score is 19  A Raw score of greater than 16 suggests the patient may benefit from discharge to home, however due to pt medical status, poor activity tolerance, and continued progress towards goals, pt would benefit from post acute rehab services  Please also refer to the recommendation of the Physical Therapist for safe discharge planning  Pt would benefit from continued inpatient skilled physical therapy to address said deficits and to maximum patient functional mobility  PT recommendation is for pt to be D/C with post acute rehab services     Barriers to Discharge Inaccessible home environment;Decreased caregiver support   Goals   Patient Goals none stated   STG Expiration Date 12/13/22   Short Term Goal #1 In 14 days pt will complete: 1) Bed mobility skills with S to facilitate safe return to previous living environment and decrease burden on caregivers  2) Functional transfers with S  to facilitate safe return to previous living environment  3) Ambulation with least restrictive AD S 50'x2' without LOB and stable vitals for safe ambulation in home/ community environment  4) Stair training up/ down 2 step/s with 1 rail/s  and Candida for safe access to previous living environment and to increase community access  5) Improve balance by 1 grade in order to decrease fall risk  6) Improve LE strength grades by 1 to increase independence w/ all functional mobility, transfers and gait  7) PT for ongoing pt and family education; DME needs and D/C planning to promote highest level of function in least restrictive environment   Plan   Treatment/Interventions Functional transfer training;LE strengthening/ROM; Therapeutic exercise; Endurance training;Cognitive reorientation;Equipment eval/education;Patient/family training;Bed mobility;Gait training;Spoke to nursing;OT   Progress Slow progress, decreased activity tolerance   PT Frequency 3-5x/wk   Recommendation   PT Discharge Recommendation Post acute rehabilitation services   AM-PAC Basic Mobility Inpatient   Turning in Bed Without Bedrails 4   Lying on Back to Sitting on Edge of Flat Bed 4   Moving Bed to Chair 3   Standing Up From Chair 3   Walk in Room 3   Climb 3-5 Stairs 2   Basic Mobility Inpatient Raw Score 19   Basic Mobility Standardized Score 42 48   Highest Level Of Mobility   JH-HLM Goal 6: Walk 10 steps or more   JH-HLM Achieved 4: Move to chair/commode   Education   Education Provided Mobility training;Assistive device   Patient Demonstrates acceptance/verbal understanding;Reinforcement needed   End of Consult   Patient Position at End of Consult Bedside chair;Bed/Chair alarm activated; All needs within [facility]

## 2022-11-29 NOTE — ASSESSMENT & PLAN NOTE
• Patient with documented chronic recurrent pleural effusion on the right   • S/p Thoracentesis on 11/08/22  ? Cytology: Minute cluster of atypical cells noted in cellblock material only  ? Atypical cluster is favored to represent contamination during specimen processing; however, given the patient's history of lung adenocarcinoma, involvement by malignancy cannot be definitively ruled out  ?  Consider repeat thoracentesis with cytology

## 2022-11-29 NOTE — CASE MANAGEMENT
Case Management Discharge Planning Note    Patient name Micky Mata Sr   Location [facility] 201/[facility] 677-20 MRN [id number]  : 1945 Date 2022       Current Admission Date: 2022  Current Admission Diagnosis:Acute on chronic respiratory failure with hypoxia and hypercapnia [facility])   Patient Active Problem List    Diagnosis Date Noted   • Sepsis ([facility] ) 2022   • Pneumonia 2022   • Acute on chronic diastolic heart failure ([facility] ) 2022   • Shortness of breath 2022   • Anemia 10/29/2022   • Acute-on-chronic kidney injury ([facility] ) 10/27/2022   • SIRS (systemic inflammatory response syndrome) ([place] [address] ) 10/27/2022   • A-fib ([place] [address] ) 10/10/2022   • Frequent hospital admissions 2022   • Hypothermia 2022   • Epididymitis, bilateral 06/15/2022   • Chronic left-sided low back pain without sciatica 2022   • Stage 3b chronic kidney disease (CKD) ([place] [address] ) 2022   • History of prostate cancer 2022   • Adenocarcinoma of lung ([place] [address] ) 2022   • Fracture of navicular bone of left foot 2021   • Acute on chronic respiratory failure with hypoxia and hypercapnia ([place] [address] ) 04/15/2021   • PAD (peripheral artery disease) ([place] [address] ) 2021   • DDD (degenerative disc disease), lumbar 2020   • Anemia, iron deficiency 2020   • Lung nodule 2020   • Pulmonary hypertension ([facility] ) 2019   • JACQUELINE (obstructive sleep apnea) 2019   • COPD with acute exacerbation ([place] [address] ) 2019   • Oxygen dependent 2018   • Acute metabolic encephalopathy    • RBBB 2018   • CAD (coronary artery disease) 2018   • Chronic diastolic heart failure ([facility] ) 2018   • Pleural effusion on right 10/31/2017   • Diabetic neuropathy ([facility] ) 2017   • Essential hypertension 2016   • Venous stasis dermatitis of both lower extremities 11/15/2016   • Type 2 diabetes mellitus with hyperglycemia, with long-term current use of insulin ([facility] ) 2016   • COPD (chronic obstructive pulmonary disease) ([facility] ) 01/20/2016   • HLD (hyperlipidemia) 01/20/2016      LOS (days): 9  Geometric Mean LOS (GMLOS) (days): 5 00  Days to GMLOS:-3 8     OBJECTIVE:  Risk of Unplanned Readmission Score: 84 99         Current admission status: Inpatient   Preferred Pharmacy:   [address] [place] [address], [address] [place] [address] [address] [place] [address] [address] [place] [address]  Phone: [phone] Fax: [phone] [facility], [facility] 232 [facility] 18 [facility] 94 [facility] 38 210 [facility]  Phone: [phone] Fax: [phone]    Primary Care Provider: Nina Mata MD    Primary Insurance: [facility]  Secondary Insurance:     DISCHARGE DETAILS:                  Additional Comments: Request for level of care determination (optioning) faxed to [facility]  Spoke with [facility] to confirm whether bed is still available, Pastor Rangel (liaison) will review and get back to CM        ADDENDUM:  [facility] is no longer able to offer a bed, CM reserved bed at [address] as per patient's preference for a facility in the [facility] area  Left phone message with son Eitan Luly about selection of [place]  Assigned CM to follow

## 2022-11-29 NOTE — PLAN OF CARE
Problem: OCCUPATIONAL THERAPY ADULT  Goal: Performs self-care activities at highest level of function for planned discharge setting  See evaluation for individualized goals  Description: Treatment Interventions: ADL retraining, Functional transfer training, Endurance training, Cognitive reorientation, Patient/family training, Equipment evaluation/education, Compensatory technique education, Energy conservation, Activityengagement  Equipment Recommended: Bedside commode, Shower/Tub chair with back ($)       See flowsheet documentation for full assessment, interventions and recommendations  Outcome: Progressing  Note: Limitation: Decreased ADL status, Decreased endurance, Decreased self-care trans, Decreased high-level ADLs, Decreased Safe judgement during ADL  Prognosis: Fair  Assessment: Pt was seen this date for OT tx session focusing on self care tasks, bed mobility, sit to stand progressions, standing tolerance, tranfers, functional mobility, safety awareness, compensatory techniques, energy conservation, and overall activity tolerance  Pt presents supine and is agreeable to OT tx session  Pt is on 2L O2 with O2 sats WNL at rest and with activity, completes previously mentioned tasks at documented assist levels please see above in flow sheet  Pt continues to require overall min A for transfers and mobility and min- mod A for self care tasks  Pt is overall limited by decreased cognition and safety, decreased balance, increased fatigue, decreased strength, endurance, and activity tolerance and continues to function below baseline level  Pt resting OOB in chair at end of session with all needs in reach and alarm on  Pt would benefit from continued OT Tx to improve overall functional abilities and increase independence  Will continue to follow with current POC       OT Discharge Recommendation: Post acute rehabilitation services

## 2022-11-29 NOTE — QUICK NOTE
11/29/2022 10:29 AM -  Dakota Ibarra Sr 's chart and case were reviewed by Oneyda De Los Santos MD   Mode of review included electronic chart check  Palliative Care initially consulted as patient was critically ill and intermittently refusing BiPAP patient is now downgraded from ICU team with clear goals  CM working on placement  Accordingly we will sign off at this time but please do not hesitate to reach out to our team if patient's clinical status were to deteriorate  For urgent issues or any questions/concerns, please notify on-call provider via [address] [place] [address]  You may also call our answering service 24/7 at   Oneyda De Los Santos MD  Palliative and Supportive Care  Clinic/Answering Service: [phone]  You can find me on [employer]!

## 2022-11-29 NOTE — PROGRESS NOTES
NEPHROLOGY PROGRESS NOTE   Palmer London  78 y o  male MRN: [id number]  Unit/Bed#: [facility] 201-01 Encounter: [id number]  Reason for Consult: MARANDA    ASSESSMENT AND PLAN:  MARANDA on CKD stage 4, baseline creatinine 2 5 to 2 7  -peak creatinine 3 2 overall improved and seems to have plateaued 2 6 today  -MARANDA suspect secondary to component of cardiorenal, bacteremia, suspected UTI, contributing to ATN  -may have to accept higher creatinine to keep patient euvolemic   -diuretics as below  -recent UA showed innumerable WBCs, 20 to 30 RBCs, 1+ proteinuria (in the setting of suspected UTI), will need eventual repeat UA with microscopy  -BMP in a m   -renal ultrasound shows no hydro, normal size kidneys, normal echogenicity      Acute on chronic CHF  -echo in May 2022 shows EF 34%, normal diastolic function  -currently remains on 2L O2 via nasal cannula which is his chronic requirement  Overall volume status seems to be improving   -weight significantly increasing despite being negative balance over last 24 hours, ? Accurate bed scale reading  Discussed with ICU nurse, will do more accurate bed scale weight today    -continue IV Bumex 1 mg b i d  For now  Hopeful transition to p o  Diuretics in next 24 hours  -continue intake and output monitoring, daily weight     Anemia in CKD, continue to monitor hemoglobin, transfuse p r n  For hemoglobin less than seven  -recent FOBT positive, component of GI bleed     Pseudomonas bacteremia, suspected UTI,? Pneumonia, on antibiotic as per ID     Acute on chronic hypoxic/hypercapnic respiratory failure, BiPAP at nighttime  -currently on 2 L O2 via nasal cannula  Management as per ICU  -overall slowly improving     Encephalopathy, slowly improving continue to monitor     Discussed above plan in detail with ICU team    SUBJECTIVE:  Patient seen and examined at bedside  He overall feels better  Remains on 2 L O2 via nasal cannula      OBJECTIVE:  Current Weight: Weight - Scale: 106 kg (233 lb 12 8 oz)  Vitals:    11/29/22 0725   BP:    Pulse:    Resp:    Temp:    SpO2: 100%       Intake/Output Summary (Last 24 hours) at 11/29/2022 0854  Last data filed at 11/29/2022 0501  Gross per 24 hour   Intake 720 ml   Output 2194 ml   Net -1474 ml     Wt Readings from Last 3 Encounters:   11/29/22 106 kg (233 lb 12 8 oz)   11/09/22 103 kg (227 lb 3 2 oz)   11/01/22 97 4 kg (214 lb 11 7 oz)     Temp Readings from Last 3 Encounters:   11/29/22 98 4 °F (36 9 °C) (Oral)   11/09/22 98 2 °F (36 8 °C) (Oral)   11/02/22 98 5 °F (36 9 °C)     BP Readings from Last 3 Encounters:   11/28/22 132/58   11/09/22 (!) 118/46   11/02/22 140/58     Pulse Readings from Last 3 Encounters:   11/28/22 70   11/09/22 65   11/02/22 94        Physical Examination:  General:  Lying in bed, no acute distress   Eyes:  Mild conjunctival pallor present  ENT:  External examination of ears and nose unremarkable  Neck:  No obvious lymphadenopathy appreciated  Respiratory:  Bilateral air entry present, decreased breath sound at base  CVS:  S1, S2 present  GI:  Soft, nondistended  CNS:  Active alert oriented  Skin:  No new rash  Musculoskeletal:  No obvious new gross deformity noted    Medications:    Current Facility-Administered Medications:   •  apixaban (ELIQUIS) tablet 5 mg, 5 mg, Oral, BID, Ceci Guardado MD, 5 mg at 11/29/22 0845  •  aspirin (ECOTRIN LOW STRENGTH) EC tablet 81 mg, 81 mg, Oral, Daily, Ceci Guardado MD, 81 mg at 11/29/22 0844  •  budesonide (PULMICORT) inhalation solution 0 5 mg, 0 5 mg, Nebulization, Q12H, Ceci Guardado MD, 0 5 mg at 11/29/22 0720  •  bumetanide (BUMEX) injection 1 mg, 1 mg, Intravenous, BID, Lawyer Lalo MD, 1 mg at 11/29/22 0847  •  carvedilol (COREG) tablet 6 25 mg, 6 25 mg, Oral, BID With Meals, Ceci Crooked, MD, 6 25 mg at 11/29/22 0844  •  cefTAZidime (FORTAZ) 2,000 mg in sodium chloride 0 9 % 50 mL IVPB, 2,000 mg, Intravenous, Q12H, Bao Chery MD, Last Rate: 100 mL/hr at 11/29/22 0024, 2,000 mg at 11/29/22 0024  •  fluticasone (FLOVENT HFA) 110 MCG/ACT inhaler 1 puff, 1 puff, Inhalation, Q12H [facility], Ailyn Chaidez MD, 1 puff at 11/29/22 0846  •  formoterol (PERFOROMIST) nebulizer solution 20 mcg, 20 mcg, Nebulization, Q12H, Ailyn Chaidez MD, 20 mcg at 11/29/22 0720  •  gabapentin (NEURONTIN) capsule 100 mg, 100 mg, Oral, TID, Ailyn Chaidez MD, 100 mg at 11/29/22 0845  •  insulin glargine (LANTUS) subcutaneous injection 30 Units 0 3 mL, 30 Units, Subcutaneous, HS, Ailyn Chaidez MD, 30 Units at 11/28/22 2224  •  insulin lispro (HumaLOG) 100 units/mL subcutaneous injection 1-6 Units, 1-6 Units, Subcutaneous, TID AC, 1 Units at 11/29/22 0610 **AND** Fingerstick Glucose (POCT), , , TID AC, Chriss Muta, DO  •  insulin lispro (HumaLOG) 100 units/mL subcutaneous injection 5 Units, 5 Units, Subcutaneous, TID With Meals, Chriss Muta, DO, 5 Units at 11/29/22 0844  •  ipratropium (ATROVENT) 0 02 % inhalation solution 0 5 mg, 0 5 mg, Nebulization, Q6H, Ailyn Chaidez MD, 0 5 mg at 11/29/22 0720  •  isosorbide mononitrate (IMDUR) 24 hr tablet 30 mg, 30 mg, Oral, Daily, Ailyn Chaidez MD, 30 mg at 11/29/22 0844  •  levalbuterol (XOPENEX) inhalation solution 1 25 mg, 1 25 mg, Nebulization, Q6H, Ailyn Chaidez MD, 1 25 mg at 11/29/22 0720  •  ondansetron (ZOFRAN) injection 4 mg, 4 mg, Intravenous, Q6H PRN, Ailyn Chaidez MD  •  pantoprazole (PROTONIX) EC tablet 40 mg, 40 mg, Oral, BID AC, Ailyn Chaidez MD, 40 mg at 11/29/22 4033  •  pravastatin (PRAVACHOL) tablet 80 mg, 80 mg, Oral, Daily With Kirsten Schaffer MD, 80 mg at 11/28/22 1733  •  QUEtiapine (SEROquel) tablet 12 5 mg, 12 5 mg, Oral, HS, Ailyn Chaidez MD, 12 5 mg at 11/28/22 2224  •  senna-docusate sodium (SENOKOT S) 8 6-50 mg per tablet 1 tablet, 1 tablet, Oral, HS, Ailyn Chaidez MD, 1 tablet at 11/28/22 2224  •  tamsulosin (FLOMAX) capsule 0 4 mg, 0 4 mg, Oral, Daily With Dinner, Ailyn Chaidez MD, 0 4 mg at 11/28/22 1738    Laboratory Results:  Results from last 7 days   Lab Units 11/29/22  0606 11/28/22  0455 11/27/22  0834 11/27/22  0518 11/26/22  0504 11/25/22  0809 11/25/22  0449 11/24/22  2034 11/24/22  1054 11/24/22  0607   WBC Thousand/uL 7 35 7 43  --  6 81 6 06 6 55 6 28  --   --  8 00   HEMOGLOBIN g/dL 7 4* 7 6*  --  7 8* 7 4* 6 7* 7 1*  --   --  7 7*   HEMATOCRIT % 26 0* 26 1*  --  26 2* 26 0* 21 6* 23 7*  --   --  27 9*   PLATELETS Thousands/uL 153 154  --  141* 136* 135* 135*  --   --  103*   SODIUM mmol/L 139 139  --  135 142  --  140 140  --  134*   POTASSIUM mmol/L 4 6 4 8 4 5 5 7* 4 3  --  4 6 4 8   < > 5 5*   CHLORIDE mmol/L 98 99  --  99 104  --  105 103  --  105   CO2 mmol/L 37* 37*  --  31 32  --  35* 34*  --  21   BUN mg/dL 51* 52*  --  54* 58*  --  59* 58*  --  61*   CREATININE mg/dL 2 61* 2 61*  --  2 41* 2 58*  --  2 84* 3 04*  --  3 24*   CALCIUM mg/dL 8 4 8 2*  --  8 4 8 3  --  8 5 8 7  --  8 3   MAGNESIUM mg/dL 2 4 2 2  --  2 5 2 5  --  2 5 2 6  --  3 0*   PHOSPHORUS mg/dL 4 1 3 9  --  3 7 3 5  --  3 9 4 4*  --  5 0*    < > = values in this interval not displayed  XR chest portable ICU   Final Result by Alexis Winslow MD (11/28 8677)      Persistent right effusion and right base atelectasis  Pneumonia not excluded in the appropriate setting  Workstation performed: [id number]         XR chest portable ICU   Final Result by Akhil Polanco MD (11/25 2753)      Unchanged moderate right pleural effusion, and right hemithorax volume loss with right basilar atelectasis  Workstation performed: Jakob Leaks kidney and bladder   Final Result by Mayank Nunez MD (11/25 0158)      No evidence of obstruction  Ascites  Cholelithiasis  Workstation performed: [id number]         CT head wo contrast   Final Result by Keshav Piña MD (11/24 1814)      No acute intracranial abnormality  Chronic microangiopathic changes  Workstation performed: [id number]         XR chest portable   Final Result by Yemi Lin MD (11/24 1515)         1  New rounded cavity in the left base raises concern for developing pneumonia  Otherwise, negative findings in the chest       The study was marked in [facility] for immediate notification  Workstation performed: [id number]         IR IN-Patient Thoracentesis   Final Result by Jojo Ordoñez MD (11/23 1918)   Impression:   1  Successful ultrasound-guided thoracentesis yielding 900 mL of serosanguineous pleural fluid  Workstation performed: [id number]         XR chest portable ICU   Final Result by Germania Bryant MD (11/21 1014)      Moderate right pleural effusion with associated right lung base opacity  Workstation performed: [id number]         CT chest wo contrast   Final Result by Candee Lombard, MD (11/20 1758)      1  Increase in moderate right pleural effusion with new small left effusion  2   New near complete occlusion of middle lobe and right lower lobe bronchial branches with atelectasis  3  New small perihepatic ascites  The study was marked in [facility] for immediate notification  Workstation performed: [id number]         XR chest 1 view portable   Final Result by Oliva Phillips MD (11/20 2056)      Increased size right pleural effusion    Workstation performed: [id number]             Portions of the record may have been created with voice recognition software  Occasional wrong word or "sound a like" substitutions may have occurred due to the inherent limitations of voice recognition software  Read the chart carefully and recognize, using context, where substitutions have occurred

## 2022-11-29 NOTE — ASSESSMENT & PLAN NOTE
Sepsis as evidenced by leukocytosis, tachypnea, secondary to suspected pneumonia vs Pseudomonas bacteremia  ID apprecaited  C/w Aura Alvarado

## 2022-11-29 NOTE — OCCUPATIONAL THERAPY NOTE
Occupational Therapy Progress Note     Patient Name: Shira Manuel  Today's Date: 11/29/2022  Problem List  Principal Problem:    Acute on chronic respiratory failure with hypoxia and hypercapnia (HCC)  Active Problems:    COPD (chronic obstructive pulmonary disease) (HCC)    Type 2 diabetes mellitus with hyperglycemia, with long-term current use of insulin (HCC)    Pleural effusion on right    CAD (coronary artery disease)    Adenocarcinoma of lung (HCC)    Acute kidney injury superimposed on chronic kidney disease ([facility])    A-fib ([facility])    Anemia    Sepsis ([facility] )    Pneumonia    Acute on chronic diastolic heart failure ([facility] )    Neurocognitive disorder    Bacteremia due to Pseudomonas            11/29/22 1030   OT Last Visit   OT Visit Date 11/29/22   Note Type   Note Type Treatment   Pain Assessment   Pain Score No Pain   Restrictions/Precautions   Weight Bearing Precautions Per Order No   Other Precautions Cognitive; Chair Alarm;Multiple lines;Telemetry;O2;Fall Risk   ADL   Where Assessed Edge of bed   UB Dressing Assistance 4  Minimal Assistance   UB Dressing Deficit Thread RUE; Thread LUE   LB Dressing Assistance 3  Moderate Assistance   LB Dressing Deficit Thread RLE into pants; Thread LLE into pants;Pull up over hips   LB Dressing Comments decreased dynmaic standing balance   Bed Mobility   Supine to Sit 5  Supervision   Additional items Increased time required;Verbal cues   Additional Comments OOB at end of session   Transfers   Sit to Stand 4  Minimal assistance   Additional items Assist x 1; Increased time required; Impulsive;Verbal cues   Stand to Sit 4  Minimal assistance   Additional items Assist x 1; Increased time required; Impulsive;Verbal cues   Stand pivot 4  Minimal assistance   Additional items Assist x 1; Increased time required; Impulsive;Verbal cues   Additional Comments Declines RW despite encouragment however would benefit from same as pt is moderatley unsteady   Subjective   Subjective "I can walk I just am not walking now!"   Cognition   Overall Cognitive Status Impaired   Arousal/Participation Responsive   Attention Attends with cues to redirect   Orientation Level Oriented to person;Oriented to place; Disoriented to time;Disoriented to situation  (Oriented to month and year only)   Memory Decreased recall of recent events;Decreased recall of precautions   Following Commands Follows one step commands with increased time or repetition   Comments Flat affect, not accepting of education or techniques, decreased safety awareness and insight noted  Of Note pt was seen by neuro psych this date to determin capacity - per neuro psych note pt does not have capacity to make medical decisions   Activity Tolerance   Activity Tolerance Patient limited by fatigue   Medical Staff Made Aware NSG aware   Assessment   Assessment Pt was seen this date for OT tx session focusing on self care tasks, bed mobility, sit to stand progressions, standing tolerance, tranfers, functional mobility, safety awareness, compensatory techniques, energy conservation, and overall activity tolerance  Pt presents supine and is agreeable to OT tx session  Pt is on 2L O2 with O2 sats WNL at rest and with activity, completes previously mentioned tasks at documented assist levels please see above in flow sheet  Pt continues to require overall min A for transfers and mobility and min- mod A for self care tasks  Pt is overall limited by decreased cognition and safety, decreased balance, increased fatigue, decreased strength, endurance, and activity tolerance and continues to function below baseline level  Pt resting OOB in chair at end of session with all needs in reach and alarm on  Pt would benefit from continued OT Tx to improve overall functional abilities and increase independence  Will continue to follow with current POC  Plan   Treatment Interventions ADL retraining;Functional transfer training; Endurance training;Cognitive reorientation;Patient/family training;Equipment evaluation/education; Compensatory technique education; Energy conservation; Activityengagement   Goal Expiration Date 12/06/22   OT Treatment Day 2   OT Frequency Other (comment)  (2-4x/wk)   Recommendation   OT Discharge Recommendation Post acute rehabilitation services   AM-PAC Daily Activity Inpatient   Lower Body Dressing 2   Bathing 2   Toileting 2   Upper Body Dressing 3   Grooming 3   Eating 4   Daily Activity Raw Score 16   Daily Activity Standardized Score (Calc for Raw Score >=11) 35 96   AM-PAC Applied Cognition Inpatient   Following a Speech/Presentation 2   Understanding Ordinary Conversation 3   Taking Medications 2   Remembering Where Things Are Placed or Put Away 2   Remembering List of 4-5 Errands 2   Taking Care of Complicated Tasks 1   Applied Cognition Raw Score 12   Applied Cognition Standardized Score 28 82

## 2022-11-29 NOTE — ASSESSMENT & PLAN NOTE
Acute on chronic respiratory failure  Patient admitted for acute heart failure exacerbation initial requiring 50 L high-flow now weaned down to 6 L nasal cannula  Now down to baseline 2L  Likely CHFand PNA

## 2022-11-29 NOTE — PROGRESS NOTES
[address] [facility]  Progress Note - Pavel Webster Sr  1945, 78 y o  male MRN: [id number]  [facility] Encounter: [id number]  Primary Care Provider: Ania Rico MD   Date and time admitted to hospital: 11/20/2022 10:40 AM    * Acute on chronic respiratory failure with hypoxia and hypercapnia ([facility] [address] )  Assessment & Plan  Acute on chronic respiratory failure  Patient admitted for acute heart failure exacerbation initial requiring 50 L high-flow now weaned down to 6 L nasal cannula  Now down to baseline 2L  Likely CHFand PNA    Acute on chronic diastolic heart failure (HCC)  Assessment & Plan  Wt Readings from Last 3 Encounters:   11/29/22 102 kg (225 lb 9 6 oz)   11/09/22 103 kg (227 lb 3 2 oz)   11/01/22 97 4 kg (214 lb 11 7 oz)       Patient with history of chronic diastolic heart failure  Presenting with shortness of breath worsening leg swelling for the past day  Patient examined volume overloaded, crackles on exam, lower extremity edema  Will continue on IV Bumex  Home dose is 40 mg Lasix daily  Fluid restriction, sodium restriction  Monitor urine output, check daily weight      Sepsis ([facility] )  Assessment & Plan  Sepsis as evidenced by leukocytosis, tachypnea, secondary to suspected pneumonia vs Pseudomonas bacteremia  ID apprecaited  C/w Fortaz    Bacteremia due to Pseudomonas  Assessment & Plan  · C/w Surekha Spare  · Likely 2/2 UTI    Neurocognitive disorder  Assessment & Plan  · Neuropsych appreciated  · Pt not competent    Pneumonia  Assessment & Plan  · ID following  · On Fortaz for pseudomonas bacteremia    Anemia  Assessment & Plan  · FOBT pos 10/19  · Hgb stable overall  · OP f/u w/ GI    A-fib ([facility])  Assessment & Plan  Continue Eliquis and carvedilol    Acute kidney injury superimposed on chronic kidney disease [facility])  Assessment & Plan  Lab Results   Component Value Date    EGFR 22 11/29/2022    EGFR 22 11/28/2022    EGFR 25 11/27/2022    CREATININE 2 61 (H) 11/29/2022    CREATININE 2 61 (H) 11/28/2022    CREATININE 2 41 (H) 11/27/2022   Estimated Creatinine Clearance: 30 2 mL/min (A) (by C-G formula based on SCr of 2 61 mg/dL (H))  Creatinine baseline seems to be around 1 8-2  MARANDA evolving on admission  Peaked at 3 2 and now stable around 2 6 w/ diuresis  Renal appreciated    Adenocarcinoma of lung [facility])  Assessment & Plan  Patient with history of adeno carcinoma status post resection and radiation therapy  Last CT chest in May of this year    CAD (coronary artery disease)  Assessment & Plan  Patient with history of CAD with stenting in 2015  Stress test in May of this year negative  Will continue aspirin, statin, beta-blocker    Pleural effusion on right  Assessment & Plan  • Patient with documented chronic recurrent pleural effusion on the right   • S/p Thoracentesis on 11/08/22  ? Cytology: Minute cluster of atypical cells noted in cellblock material only  ? Atypical cluster is favored to represent contamination during specimen processing; however, given the patient's history of lung adenocarcinoma, involvement by malignancy cannot be definitively ruled out  ?  Consider repeat thoracentesis with cytology     Type 2 diabetes mellitus with hyperglycemia, with long-term current use of insulin [facility])  Assessment & Plan  Lab Results   Component Value Date    HGBA1C 9 7 (H) 10/10/2022       Recent Labs     11/28/22  1714 11/28/22  2058 11/29/22  0604 11/29/22  1128   POCGLU 145* 219* 167* 223*       Blood Sugar Average: Last 72 hrs:  (P) 014 [id number]     Home regimen includes 30 units at bedtime, 10 units with meals  Will continue 30 units Lantus at bedtime, 5 units with meals, still continue sliding scale    COPD (chronic obstructive pulmonary disease) ([facility] [address] )  Assessment & Plan  Patient with history of COPD on 2 L nasal cannula baseline  Do not believe patient has a COPD exacerbation at this time  Currently on baseline 2L      VTE Pharmacologic Prophylaxis: VTE Score: 10 High Risk (Score >/= 5) - Pharmacological DVT Prophylaxis Ordered: apixaban (Eliquis)  Sequential Compression Devices Ordered  Patient Centered Rounds: I performed bedside rounds with nursing staff today  Discussions with Specialists or Other Care Team Provider: not yet    Education and Discussions with Family / Patient: Updated  (son) via phone  Time Spent for Care: 30 minutes  More than 50% of total time spent on counseling and coordination of care as described above  Current Length of Stay: 9 day(s)  Current Patient Status: Inpatient   Certification Statement: The patient will continue to require additional inpatient hospital stay due to need for IV Bumex  Discharge Plan: Anticipate discharge in 48-72 hrs to rehab facility  Code Status: Level 1 - Full Code    Subjective:   No acute complaints    Objective:     Vitals:   Temp (24hrs), Av 1 °F (36 7 °C), Min:97 5 °F (36 4 °C), Max:98 5 °F (36 9 °C)    Temp:  [97 5 °F (36 4 °C)-98 5 °F (36 9 °C)] 97 9 °F (36 6 °C)  HR:  [70-72] 70  BP: (120-140)/(55-58) 129/58  SpO2:  [93 %-100 %] 93 %  Body mass index is 30 6 kg/m²  Input and Output Summary (last 24 hours): Intake/Output Summary (Last 24 hours) at 2022 1218  Last data filed at 2022 0901  Gross per 24 hour   Intake 620 ml   Output 1979 ml   Net -1359 ml       Physical Exam:   Physical Exam  HENT:      Head: Normocephalic and atraumatic  Nose: Nose normal       Mouth/Throat:      Mouth: Mucous membranes are moist    Eyes:      Extraocular Movements: Extraocular movements intact  Conjunctiva/sclera: Conjunctivae normal    Cardiovascular:      Rate and Rhythm: Normal rate and regular rhythm  Pulmonary:      Effort: Pulmonary effort is normal       Comments: Decreased BS  Abdominal:      General: Bowel sounds are normal  There is no distension  Palpations: Abdomen is soft  Tenderness: There is no abdominal tenderness     Musculoskeletal: General: Normal range of motion  Cervical back: Normal range of motion and neck supple  Right lower leg: No edema  Left lower leg: No edema  Skin:     General: Skin is warm and dry  Neurological:      Mental Status: He is alert and oriented to person, place, and time  Additional Data:     Labs:  Results from last 7 days   Lab Units 11/29/22  0606 11/28/22  0455 11/27/22  0518   WBC Thousand/uL 7 35   < > 6 81   HEMOGLOBIN g/dL 7 4*   < > 7 8*   HEMATOCRIT % 26 0*   < > 26 2*   PLATELETS Thousands/uL 153   < > 141*   NEUTROS PCT %  --   --  72   LYMPHS PCT %  --   --  13*   MONOS PCT %  --   --  12   EOS PCT %  --   --  2    < > = values in this interval not displayed  Results from last 7 days   Lab Units 11/29/22  0606   SODIUM mmol/L 139   POTASSIUM mmol/L 4 6   CHLORIDE mmol/L 98   CO2 mmol/L 37*   BUN mg/dL 51*   CREATININE mg/dL 2 61*   ANION GAP mmol/L 4   CALCIUM mg/dL 8 4   ALBUMIN g/dL 2 0*   TOTAL BILIRUBIN mg/dL 0 31   ALK PHOS U/L 59   ALT U/L 9*   AST U/L 6   GLUCOSE RANDOM mg/dL 169*     Results from last 7 days   Lab Units 11/25/22  0809   INR  1 19     Results from last 7 days   Lab Units 11/29/22  1128 11/29/22  0604 11/28/22  2058 11/28/22  1714 11/28/22  1150 11/28/22  0834 11/27/22  2208 11/27/22  1637 11/27/22  1111 11/27/22  0557 11/26/22  2323 11/26/22  2137   POC GLUCOSE mg/dl 223* 167* 219* 145* 189* 188* 229* 215* 155* 175* 266* 235*         Results from last 7 days   Lab Units 11/25/22  0449 11/23/22  0451   PROCALCITONIN ng/ml 0 64* 1 37*       Lines/Drains:  Invasive Devices     Peripheral Intravenous Line  Duration           Peripheral IV 11/28/22 Dorsal (posterior); Left Forearm <1 day          Drain  Duration           External Urinary Catheter Other (Comment) 1 day                       Imaging: No pertinent imaging reviewed      Recent Cultures (last 7 days):   Results from last 7 days   Lab Units 11/23/22  1804 11/22/22  1705   BLOOD CULTURE   -- No Growth After 5 Days  No Growth After 5 Days  GRAM STAIN RESULT  1+ Polys  --    BODY FLUID CULTURE, STERILE  No growth  --        Last 24 Hours Medication List:   Current Facility-Administered Medications   Medication Dose Route Frequency Provider Last Rate   • acetaminophen  650 mg Oral Q6H PRN Yao Wallace DO     • apixaban  5 mg Oral BID Darell Ward MD     • aspirin  81 mg Oral Daily Darell Ward MD     • budesonide  0 5 mg Nebulization Q12H Darell Ward MD     • bumetanide  1 mg Intravenous BID Gurjit Loo MD     • carvedilol  6 25 mg Oral BID With Meals Darell Ward MD     • cefTAZidime  2,000 mg Intravenous Q12H Cynthia Plunkett MD 2,000 mg (11/29/22 0024)   • fluticasone  1 puff Inhalation Q12H [address] Darell Ward MD     • formoterol  20 mcg Nebulization Q12H Darell Ward MD     • gabapentin  100 mg Oral TID Darell Ward MD     • insulin glargine  30 Units Subcutaneous HS Darell Ward MD     • insulin lispro  1-6 Units Subcutaneous TID AC Ronaldo Del Rio DO     • insulin lispro  5 Units Subcutaneous TID With Meals Ronaldo Del Rio DO     • ipratropium  0 5 mg Nebulization Q6H Darell Ward MD     • isosorbide mononitrate  30 mg Oral Daily Darell Ward MD     • levalbuterol  1 25 mg Nebulization Q6H Darell Ward MD     • ondansetron  4 mg Intravenous Q6H PRN Darell Ward MD     • pantoprazole  40 mg Oral BID AC Darell Ward MD     • pravastatin  80 mg Oral Daily With Keny Llanos MD     • QUEtiapine  12 5 mg Oral HS Darell Ward MD     • senna-docusate sodium  1 tablet Oral HS Darell Ward MD     • tamsulosin  0 4 mg Oral Daily With Keny Llanos MD          Today, Patient Was Seen By: Yao Wallace DO    **Please Note: This note may have been constructed using a voice recognition system  **

## 2022-11-29 NOTE — ASSESSMENT & PLAN NOTE
Lab Results   Component Value Date    EGFR 22 11/29/2022    EGFR 22 11/28/2022    EGFR 25 11/27/2022    CREATININE 2 61 (H) 11/29/2022    CREATININE 2 61 (H) 11/28/2022    CREATININE 2 41 (H) 11/27/2022   Estimated Creatinine Clearance: 30 2 mL/min (A) (by C-G formula based on SCr of 2 61 mg/dL (H))    Creatinine baseline seems to be around 1 8-2  MARANDA evolving on admission  Peaked at 3 2 and now stable around 2 6 w/ diuresis  Renal appreciated

## 2022-11-29 NOTE — PLAN OF CARE
Problem: PHYSICAL THERAPY ADULT  Goal: Performs mobility at highest level of function for planned discharge setting  See evaluation for individualized goals  Description: Treatment/Interventions: ADL retraining, Functional transfer training, LE strengthening/ROM, Elevations, Therapeutic exercise, Endurance training, Cognitive reorientation, Patient/family training, Equipment eval/education, Bed mobility, Gait training, Continued evaluation, Compensatory technique education, Spoke to nursing, Spoke to case management, Spoke to advanced practitioner, OT  Equipment Recommended: Dahlia Huynh       See flowsheet documentation for full assessment, interventions and recommendations  11/29/2022 1653 by Ethan Rasheed  Outcome: Progressing  Note: Prognosis: Fair  Problem List: Decreased strength, Decreased endurance, Impaired balance, Decreased mobility, Decreased cognition, Decreased safety awareness, Decreased coordination, Impaired judgement  Assessment: Pt supine in bed upon arrival  Pt tx session focused on mobility, balance, and LE strengthening  Pt continues to require Flory for transfers and ambulation  Pt denied usage of RW but would benefit from AD to address balance, safety, and strength deficits  Pt requires extensive VC for proper transfers  Pt unable to ambulate previous session distance 2* endurance deficit  Pt able sit with fair trunk/postural control EOB and in chair, where session ended  Pt had all needs within reach  Performed at least 2 patient identifiers during session: Name and Danette Cai The patient's AM-PAC Basic Mobility Inpatient Short Form Raw Score is 19  A Raw score of greater than 16 suggests the patient may benefit from discharge to home, however due to pt medical status, poor activity tolerance, and continued progress towards goals, pt would benefit from post acute rehab services  Please also refer to the recommendation of the Physical Therapist for safe discharge planning   Pt would benefit from continued inpatient skilled physical therapy to address said deficits and to maximum patient functional mobility  PT recommendation is for pt to be D/C with post acute rehab services  Barriers to Discharge: Inaccessible home environment, Decreased caregiver support     PT Discharge Recommendation: Post acute rehabilitation services    See flowsheet documentation for full assessment

## 2022-11-29 NOTE — CONSULTS
Consultation - Neuropsychology/Psychology Department  Tutu Calix  78 y o  male MRN: [id number]  Unit/Bed#: [facility] 201-01 Encounter: [id number]        Reason for Consultation:  Tutu Calix  is a 78y o  year old male who was referred for a Neuropsychological Exam to assess cognitive functioning and comment on capacity to make informed medical decisions        History of Present Illness  Admitted with shortness of breath, congestive heart failure, sepsis, acute respiratory failure   Physician Requesting Consult: Joey Longoria DO    PROBLEM LIST:  Patient Active Problem List   Diagnosis   • COPD (chronic obstructive pulmonary disease) ([facility] )   • HLD (hyperlipidemia)   • Type 2 diabetes mellitus with hyperglycemia, with long-term current use of insulin ([facility])   • Venous stasis dermatitis of both lower extremities   • Pleural effusion on right   • Chronic diastolic heart failure ([facility])   • Essential hypertension   • Diabetic neuropathy ([facility])   • CAD (coronary artery disease)   • Acute metabolic encephalopathy   • RBBB   • Oxygen dependent   • COPD with acute exacerbation ([facility])   • Pulmonary hypertension ([facility])   • JACQUELINE (obstructive sleep apnea)   • Lung nodule   • DDD (degenerative disc disease), lumbar   • Anemia, iron deficiency   • PAD (peripheral artery disease) ([facility])   • Acute on chronic respiratory failure with hypoxia and hypercapnia ([facility])   • Fracture of navicular bone of left foot   • Adenocarcinoma of lung ([facility])   • History of prostate cancer   • Stage 3b chronic kidney disease (CKD) ([facility] [address] )   • Chronic left-sided low back pain without sciatica   • Epididymitis, bilateral   • Hypothermia   • Frequent hospital admissions   • fib [facility])   • Acute-on-chronic kidney injury ([facility] )   • SIRS (systemic inflammatory response syndrome) ([facility] )   • Anemia   • Shortness of breath   • Sepsis ([facility] )   • Pneumonia   • Acute on chronic diastolic heart failure ([facility] )         Historical Information   Past Medical History: Diagnosis Date   • Abdominal pain    • MARANDA (acute kidney injury) ([facility] [address] ) 2018   • Cardiac disease    • CHF (congestive heart failure) (HCC)    • COPD, severe (HCC)    • Coronary artery disease    • DDD (degenerative disc disease), lumbar 2020   • Diabetes mellitus (HCC)    • Dyspnea    • GERD (gastroesophageal reflux disease)    • Hyperlipidemia    • Hypertension    • [facility]ocardial infarction) [facility] )     with 3 stents   • Nodule of apex of right lung    • JACQUELINE (obstructive sleep apnea)    • Prostate cancer [facility])      Past Surgical History:   Procedure Laterality Date   • ABDOMINAL SURGERY      exploratory   • ANGIOPLASTY      3 stents   • APPENDECTOMY     • COLONOSCOPY     • CT NEEDLE BIOPSY LUNG  11/3/2020   • ESOPHAGOGASTRODUODENOSCOPY N/A 10/2/2017    Procedure: ESOPHAGOGASTRODUODENOSCOPY (EGD); Surgeon: Sylvester Schulte MD;  Location:  GI LAB;   Service: Gastroenterology   • IR THORACENTESIS  11/3/2020   • IR THORACENTESIS  2022   • IR THORACENTESIS  2022   • KNEE CARTILAGE SURGERY     • OTHER SURGICAL HISTORY      stent placement   • PROSTATE SURGERY     • SKIN GRAFT      Basal cell CA back     Social History   Social History     Substance and Sexual Activity   Alcohol Use Never     Social History     Substance and Sexual Activity   Drug Use No     Social History     Tobacco Use   Smoking Status Former   • Packs/day: 1 50   • Years: 50 00   • Pack years: 75 00   • Types: Cigarettes   • Start date: 38   • Quit date: 2020   • Years since quittin 3   Smokeless Tobacco Never     Family History:   Family History   Problem Relation Age of Onset   • Heart disease Father    • Other Father         Mesothelioma        Meds/Allergies   current meds:   Current Facility-Administered Medications   Medication Dose Route Frequency   • apixaban (ELIQUIS) tablet 5 mg  5 mg Oral BID   • aspirin (ECOTRIN LOW STRENGTH) EC tablet 81 mg  81 mg Oral Daily   • budesonide (PULMICORT) inhalation solution 0 5 mg  0 5 mg Nebulization Q12H   • bumetanide (BUMEX) injection 1 mg  1 mg Intravenous BID   • carvedilol (COREG) tablet 6 25 mg  6 25 mg Oral BID With Meals   • cefTAZidime (FORTAZ) 2,000 mg in sodium chloride 0 9 % 50 mL IVPB  2,000 mg Intravenous Q12H   • fluticasone (FLOVENT HFA) 110 MCG/ACT inhaler 1 puff  1 puff Inhalation Q12H [address]   • formoterol (PERFOROMIST) nebulizer solution 20 mcg  20 mcg Nebulization Q12H   • gabapentin (NEURONTIN) capsule 100 mg  100 mg Oral TID   • insulin glargine (LANTUS) subcutaneous injection 30 Units 0 3 mL  30 Units Subcutaneous HS   • insulin lispro (HumaLOG) 100 units/mL subcutaneous injection 1-6 Units  1-6 Units Subcutaneous TID AC   • insulin lispro (HumaLOG) 100 units/mL subcutaneous injection 5 Units  5 Units Subcutaneous TID With Meals   • ipratropium (ATROVENT) 0 02 % inhalation solution 0 5 mg  0 5 mg Nebulization Q6H   • isosorbide mononitrate (IMDUR) 24 hr tablet 30 mg  30 mg Oral Daily   • levalbuterol (XOPENEX) inhalation solution 1 25 mg  1 25 mg Nebulization Q6H   • ondansetron (ZOFRAN) injection 4 mg  4 mg Intravenous Q6H PRN   • pantoprazole (PROTONIX) EC tablet 40 mg  40 mg Oral BID AC   • pravastatin (PRAVACHOL) tablet 80 mg  80 mg Oral Daily With Dinner   • QUEtiapine (SEROquel) tablet 12 5 mg  12 5 mg Oral HS   • senna-docusate sodium (SENOKOT S) 8 6-50 mg per tablet 1 tablet  1 tablet Oral HS   • tamsulosin (FLOMAX) capsule 0 4 mg  0 4 mg Oral Daily With Dinner       Allergies   Allergen Reactions   • Crestor [Rosuvastatin] Other (See Comments)     Unknown     • Lisinopril GI Intolerance, Dizziness and Abdominal Pain   • Metformin GI Intolerance         Family and Social Support:   No data recorded    Behavioral Observations: Alert, oriented except for day/mopnth and day/week; affect appeared flat and patient denied depressed mood and anxiety; patient reported he will be attending rehab and then plans to return home      Cognitive Examination    General Cognitive Functioning MMSE = Impaired 20/28; deficits in aspects of orientation, working memory, recall and following commands    Attention/Concentration Auditory Selective Attention = Average; Auditory Vigilance = Within Normal Limits; Information Processing Speed = Low Average    Frontal Systems/Executive Functioning Mental Flexibility/Cognitive Control = Impaired; Working Memory = Impaired Abstract Reasoning = Impaired; Generative Ability = Impaired, Commonsense Reasoning and Judgement = Impaired    Language Functioning Confrontation naming = Within Normal Limits, Phonemic Fluency = Impaired; Semantic Retrieval = Impaired; Comprehension of Complex Ideational Material = Impaired;  Praxis = Within Normal Limits; Repetition = Within Normal Limits; Basic Reading = Within Normal Limits; Following Commands = Impaired    Memory Functioning Narrative Recall - Short Delay = Impaired; Long Delay Narrative Recall = Impaired; Three word recall = Impaired    Visuo-Spatial Abilities Not Assessed    Functional Knowledge  Health & Safety Knowledge = Impaired;     Summary/Impression:  Results of Neuropsychological Exam revealed diffuse cognitive dysfunction and on a measure assessing awareness of personal health status and ability to evaluate health problems, handle medical emergencies and take safety precautions, patient performed in the IMPAIRED range of functioning  During this encounter, patient does not appear to have capacity to make fully informed medical decisions   Unspecified Neurocognitive Disorder

## 2022-11-29 NOTE — ASSESSMENT & PLAN NOTE
Patient with history of COPD on 2 L nasal cannula baseline  Do not believe patient has a COPD exacerbation at this time  Currently on baseline 2L

## 2022-11-29 NOTE — ASSESSMENT & PLAN NOTE
Wt Readings from Last 3 Encounters:   11/29/22 102 kg (225 lb 9 6 oz)   11/09/22 103 kg (227 lb 3 2 oz)   11/01/22 97 4 kg (214 lb 11 7 oz)       Patient with history of chronic diastolic heart failure  Presenting with shortness of breath worsening leg swelling for the past day  Patient examined volume overloaded, crackles on exam, lower extremity edema  Will continue on IV Bumex  Home dose is 40 mg Lasix daily  Fluid restriction, sodium restriction  Monitor urine output, check daily weight

## 2022-11-30 ENCOUNTER — APPOINTMENT (INPATIENT)
Dept: RADIOLOGY | Facility: HOSPITAL | Age: 77
End: 2022-11-30

## 2022-11-30 ENCOUNTER — APPOINTMENT (INPATIENT)
Dept: RADIOLOGY | Facility: HOSPITAL | Age: 77
End: 2022-11-30
Attending: GENERAL PRACTICE

## 2022-11-30 LAB
ANION GAP SERPL CALCULATED.3IONS-SCNC: 3 MMOL/L (ref 4–13)
BASE EX.OXY STD BLDV CALC-SCNC: 19.1 % (ref 60–80)
BASE EXCESS BLDV CALC-SCNC: 16.2 MMOL/L
BUN SERPL-MCNC: 47 MG/DL (ref 5–25)
CALCIUM SERPL-MCNC: 8.6 MG/DL (ref 8.3–10.1)
CHLORIDE SERPL-SCNC: 98 MMOL/L (ref 96–108)
CO2 SERPL-SCNC: 38 MMOL/L (ref 21–32)
CREAT SERPL-MCNC: 2.61 MG/DL (ref 0.6–1.3)
ERYTHROCYTE [DISTWIDTH] IN BLOOD BY AUTOMATED COUNT: 15 % (ref 11.6–15.1)
GFR SERPL CREATININE-BSD FRML MDRD: 22 ML/MIN/1.73SQ M
GLUCOSE SERPL-MCNC: 203 MG/DL (ref 65–140)
GLUCOSE SERPL-MCNC: 205 MG/DL (ref 65–140)
GLUCOSE SERPL-MCNC: 209 MG/DL (ref 65–140)
GLUCOSE SERPL-MCNC: 215 MG/DL (ref 65–140)
GLUCOSE SERPL-MCNC: 236 MG/DL (ref 65–140)
HCO3 BLDV-SCNC: 44.7 MMOL/L (ref 24–30)
HCT VFR BLD AUTO: 25.7 % (ref 36.5–49.3)
HGB BLD-MCNC: 7.6 G/DL (ref 12–17)
MCH RBC QN AUTO: 25.3 PG (ref 26.8–34.3)
MCHC RBC AUTO-ENTMCNC: 29.6 G/DL (ref 31.4–37.4)
MCV RBC AUTO: 86 FL (ref 82–98)
O2 CT BLDV-SCNC: 2 ML/DL
PCO2 BLDV: 92.5 MM HG (ref 42–50)
PH BLDV: 7.3 [PH] (ref 7.3–7.4)
PLATELET # BLD AUTO: 168 THOUSANDS/UL (ref 149–390)
PMV BLD AUTO: 10.5 FL (ref 8.9–12.7)
PO2 BLDV: 15.7 MM HG (ref 35–45)
POTASSIUM SERPL-SCNC: 4.5 MMOL/L (ref 3.5–5.3)
RBC # BLD AUTO: 3 MILLION/UL (ref 3.88–5.62)
SODIUM SERPL-SCNC: 139 MMOL/L (ref 135–147)
WBC # BLD AUTO: 8.51 THOUSAND/UL (ref 4.31–10.16)

## 2022-11-30 RX ORDER — INSULIN GLARGINE 100 [IU]/ML
36 INJECTION, SOLUTION SUBCUTANEOUS
Status: DISCONTINUED | OUTPATIENT
Start: 2022-11-30 | End: 2022-12-02 | Stop reason: HOSPADM

## 2022-11-30 RX ORDER — TORSEMIDE 20 MG/1
60 TABLET ORAL DAILY
Status: DISCONTINUED | OUTPATIENT
Start: 2022-11-30 | End: 2022-12-02 | Stop reason: HOSPADM

## 2022-11-30 RX ADMIN — GABAPENTIN 100 MG: 100 CAPSULE ORAL at 22:00

## 2022-11-30 RX ADMIN — BUDESONIDE 0.5 MG: 0.5 INHALANT ORAL at 08:16

## 2022-11-30 RX ADMIN — PANTOPRAZOLE SODIUM 40 MG: 40 TABLET, DELAYED RELEASE ORAL at 17:17

## 2022-11-30 RX ADMIN — LEVALBUTEROL HYDROCHLORIDE 1.25 MG: 1.25 SOLUTION, CONCENTRATE RESPIRATORY (INHALATION) at 02:32

## 2022-11-30 RX ADMIN — GABAPENTIN 100 MG: 100 CAPSULE ORAL at 17:17

## 2022-11-30 RX ADMIN — QUETIAPINE FUMARATE 12.5 MG: 25 TABLET ORAL at 22:00

## 2022-11-30 RX ADMIN — FLUTICASONE PROPIONATE 1 PUFF: 110 AEROSOL, METERED RESPIRATORY (INHALATION) at 22:03

## 2022-11-30 RX ADMIN — BUDESONIDE 0.5 MG: 0.5 INHALANT ORAL at 19:53

## 2022-11-30 RX ADMIN — IPRATROPIUM BROMIDE 0.5 MG: 0.5 SOLUTION RESPIRATORY (INHALATION) at 13:38

## 2022-11-30 RX ADMIN — INSULIN GLARGINE 36 UNITS: 100 INJECTION, SOLUTION SUBCUTANEOUS at 22:00

## 2022-11-30 RX ADMIN — INSULIN LISPRO 5 UNITS: 100 INJECTION, SOLUTION INTRAVENOUS; SUBCUTANEOUS at 17:21

## 2022-11-30 RX ADMIN — IPRATROPIUM BROMIDE 0.5 MG: 0.5 SOLUTION RESPIRATORY (INHALATION) at 19:53

## 2022-11-30 RX ADMIN — ISOSORBIDE MONONITRATE 30 MG: 30 TABLET, EXTENDED RELEASE ORAL at 09:42

## 2022-11-30 RX ADMIN — LEVALBUTEROL HYDROCHLORIDE 1.25 MG: 1.25 SOLUTION, CONCENTRATE RESPIRATORY (INHALATION) at 19:53

## 2022-11-30 RX ADMIN — TORSEMIDE 60 MG: 20 TABLET ORAL at 17:14

## 2022-11-30 RX ADMIN — CARVEDILOL 6.25 MG: 6.25 TABLET, FILM COATED ORAL at 09:41

## 2022-11-30 RX ADMIN — ASPIRIN 81 MG: 81 TABLET, COATED ORAL at 09:41

## 2022-11-30 RX ADMIN — APIXABAN 5 MG: 5 TABLET, FILM COATED ORAL at 09:42

## 2022-11-30 RX ADMIN — TAMSULOSIN HYDROCHLORIDE 0.4 MG: 0.4 CAPSULE ORAL at 17:14

## 2022-11-30 RX ADMIN — LEVALBUTEROL HYDROCHLORIDE 1.25 MG: 1.25 SOLUTION, CONCENTRATE RESPIRATORY (INHALATION) at 13:38

## 2022-11-30 RX ADMIN — INSULIN LISPRO 2 UNITS: 100 INJECTION, SOLUTION INTRAVENOUS; SUBCUTANEOUS at 08:04

## 2022-11-30 RX ADMIN — PANTOPRAZOLE SODIUM 40 MG: 40 TABLET, DELAYED RELEASE ORAL at 09:41

## 2022-11-30 RX ADMIN — FLUTICASONE PROPIONATE 1 PUFF: 110 AEROSOL, METERED RESPIRATORY (INHALATION) at 09:43

## 2022-11-30 RX ADMIN — INSULIN LISPRO 5 UNITS: 100 INJECTION, SOLUTION INTRAVENOUS; SUBCUTANEOUS at 08:01

## 2022-11-30 RX ADMIN — FORMOTEROL FUMARATE DIHYDRATE 20 MCG: 20 SOLUTION RESPIRATORY (INHALATION) at 08:16

## 2022-11-30 RX ADMIN — GABAPENTIN 100 MG: 100 CAPSULE ORAL at 09:41

## 2022-11-30 RX ADMIN — INSULIN LISPRO 5 UNITS: 100 INJECTION, SOLUTION INTRAVENOUS; SUBCUTANEOUS at 12:09

## 2022-11-30 RX ADMIN — IPRATROPIUM BROMIDE 0.5 MG: 0.5 SOLUTION RESPIRATORY (INHALATION) at 08:16

## 2022-11-30 RX ADMIN — INSULIN LISPRO 3 UNITS: 100 INJECTION, SOLUTION INTRAVENOUS; SUBCUTANEOUS at 12:10

## 2022-11-30 RX ADMIN — INSULIN LISPRO 2 UNITS: 100 INJECTION, SOLUTION INTRAVENOUS; SUBCUTANEOUS at 17:21

## 2022-11-30 RX ADMIN — CARVEDILOL 6.25 MG: 6.25 TABLET, FILM COATED ORAL at 17:14

## 2022-11-30 RX ADMIN — BUMETANIDE 1 MG: 0.25 INJECTION, SOLUTION INTRAMUSCULAR; INTRAVENOUS at 09:42

## 2022-11-30 RX ADMIN — APIXABAN 5 MG: 5 TABLET, FILM COATED ORAL at 17:14

## 2022-11-30 RX ADMIN — PRAVASTATIN SODIUM 80 MG: 80 TABLET ORAL at 17:14

## 2022-11-30 RX ADMIN — IPRATROPIUM BROMIDE 0.5 MG: 0.5 SOLUTION RESPIRATORY (INHALATION) at 02:32

## 2022-11-30 RX ADMIN — LEVALBUTEROL HYDROCHLORIDE 1.25 MG: 1.25 SOLUTION, CONCENTRATE RESPIRATORY (INHALATION) at 08:16

## 2022-11-30 NOTE — PROGRESS NOTES
[address] [facility]  Progress Note - Norma Pulling Sr  1945, 78 y o  male MRN: [id number]  Unit/Bed#: [facility] 531-01 Encounter: [id number]  Primary Care Provider: Wilfred Bishop MD   Date and time admitted to hospital: 11/20/2022 10:40 AM    * Acute on chronic respiratory failure with hypoxia and hypercapnia ([facility] [address] )  Assessment & Plan  Acute on chronic respiratory failure  Patient admitted for acute heart failure exacerbation initial requiring 50 L high-flow now weaned down to 6 L nasal cannula  Now down to baseline 2L  Likely CHFand PNA  Today pt appears slightly tachypnic  VBG chronic resp acidosis  CXR right lung still w/ opacity    Will check CT chest to clarfiy and if effusion still present, will ask IR for thora including cytology    Acute on chronic diastolic heart failure (HCC)  Assessment & Plan  Wt Readings from Last 3 Encounters:   11/30/22 103 kg (228 lb)   11/09/22 103 kg (227 lb 3 2 oz)   11/01/22 97 4 kg (214 lb 11 7 oz)       Patient with history of chronic diastolic heart failure  Presenting with shortness of breath worsening leg swelling for the past day  Patient examined volume overloaded, crackles on exam, lower extremity edema  IV Bumex -> Torsemide  Home dose was 40 mg Lasix daily  Fluid restriction, sodium restriction  Monitor urine output, check daily weight      Sepsis ([facility] )  Assessment & Plan  Sepsis as evidenced by leukocytosis, tachypnea, secondary to suspected pneumonia and Pseudomonas bacteremia  ID apprecaited  Completed 10 days Cefepime/Fortaz    Bacteremia due to Pseudomonas  Assessment & Plan  · C/w Raudel Sachs  · Likely 2/2 UTI    Neurocognitive disorder  Assessment & Plan  · Neuropsych appreciated  · Pt not competent    Pneumonia  Assessment & Plan  · ID appreciated  · Completed 10 days Cefepime/Fortaz    Anemia  Assessment & Plan  · FOBT pos 10/19  · Hgb stable overall  · OP f/u w/ GI    A-fib ([facility])  Assessment & Plan  Continue Eliquis and carvedilol    Acute kidney injury superimposed on chronic kidney disease [facility])  Assessment & Plan  Lab Results   Component Value Date    EGFR 22 11/30/2022    EGFR 22 11/29/2022    EGFR 22 11/28/2022    CREATININE 2 61 (H) 11/30/2022    CREATININE 2 61 (H) 11/29/2022    CREATININE 2 61 (H) 11/28/2022   Estimated Creatinine Clearance: 30 4 mL/min (A) (by C-G formula based on SCr of 2 61 mg/dL (H))  Creatinine baseline seems to be around 1 8-2  MARANDA evolving on admission  Peaked at 3 2 and now stable around 2 6 w/ diuresis  Renal appreciated    Adenocarcinoma of lung [facility])  Assessment & Plan  Patient with history of adeno carcinoma status post resection and radiation therapy  Last CT chest in May of this year    CAD (coronary artery disease)  Assessment & Plan  Patient with history of CAD with stenting in 2015  Stress test in May of this year negative  Will continue aspirin, statin, beta-blocker    Pleural effusion on right  Assessment & Plan  • Patient with documented chronic recurrent pleural effusion on the right   • S/p Thoracentesis on 11/08/22  ? Cytology: Minute cluster of atypical cells noted in cellblock material only  ? Atypical cluster is favored to represent contamination during specimen processing; however, given the patient's history of lung adenocarcinoma, involvement by malignancy cannot be definitively ruled out  ?  Consider repeat thoracentesis with cytology based on CT chest    Type 2 diabetes mellitus with hyperglycemia, with long-term current use of insulin [facility])  Assessment & Plan  Lab Results   Component Value Date    HGBA1C 9 7 (H) 10/10/2022       Recent Labs     11/29/22  1609 11/29/22  2039 11/30/22  0619 11/30/22  1047   POCGLU 342* 215* 203* 236*       Blood Sugar Average: Last 72 hrs:  (P) 848 [id number]     Home regimen includes 30 units at bedtime, 10 units with meals  Will increase Lantus to 36U at bedtime, c/w 5 units Humalog with meals, still continue sliding scale  Add DM modifier to diet    COPD (chronic obstructive pulmonary disease) ([facility])  Assessment & Plan  Patient with history of COPD on 2 L nasal cannula baseline  Do not believe patient has a COPD exacerbation at this time  Currently on baseline 2L      VTE Pharmacologic Prophylaxis: VTE Score: 10 High Risk (Score >/= 5) - Pharmacological DVT Prophylaxis Ordered: apixaban (Eliquis)  Sequential Compression Devices Ordered  Patient Centered Rounds: I performed bedside rounds with nursing staff today  Discussions with Specialists or Other Care Team Provider: not yet    Education and Discussions with Family / Patient: Updated  (son) via phone  Time Spent for Care: 30 minutes  More than 50% of total time spent on counseling and coordination of care as described above  Current Length of Stay: 10 day(s)  Current Patient Status: Inpatient   Certification Statement: The patient will continue to require additional inpatient hospital stay due to need for CT chest  Discharge Plan: Anticipate discharge in 48-72 hrs to rehab facility  Code Status: Level 1 - Full Code    Subjective:   Pt noted to have hypoxia this AM    Objective:     Vitals:   Temp (24hrs), Av 3 °F (36 8 °C), Min:97 9 °F (36 6 °C), Max:98 6 °F (37 °C)    Temp:  [97 9 °F (36 6 °C)-98 6 °F (37 °C)] 97 9 °F (36 6 °C)  HR:  [65-70] 67  Resp:  [18] 18  BP: (129-153)/(53-71) 149/71  SpO2:  [98 %-100 %] 99 %  Body mass index is 30 92 kg/m²  Input and Output Summary (last 24 hours): Intake/Output Summary (Last 24 hours) at 2022 1451  Last data filed at 2022 1239  Gross per 24 hour   Intake 120 ml   Output 380 ml   Net -260 ml       Physical Exam:   Physical Exam  HENT:      Head: Normocephalic and atraumatic  Nose: Nose normal       Mouth/Throat:      Mouth: Mucous membranes are moist    Eyes:      Extraocular Movements: Extraocular movements intact        Conjunctiva/sclera: Conjunctivae normal    Cardiovascular:      Rate and Rhythm: Normal rate and regular rhythm  Pulmonary:      Effort: Pulmonary effort is normal       Comments: Decreased BS  Abdominal:      General: Bowel sounds are normal       Palpations: Abdomen is soft  Musculoskeletal:         General: Normal range of motion  Cervical back: Normal range of motion  Right lower leg: No edema  Left lower leg: No edema  Skin:     General: Skin is warm and dry  Neurological:      Mental Status: He is alert and oriented to person, place, and time  Additional Data:     Labs:  Results from last 7 days   Lab Units 11/30/22  0628 11/28/22  0455 11/27/22  0518   WBC Thousand/uL 8 51   < > 6 81   HEMOGLOBIN g/dL 7 6*   < > 7 8*   HEMATOCRIT % 25 7*   < > 26 2*   PLATELETS Thousands/uL 168   < > 141*   NEUTROS PCT %  --   --  72   LYMPHS PCT %  --   --  13*   MONOS PCT %  --   --  12   EOS PCT %  --   --  2    < > = values in this interval not displayed  Results from last 7 days   Lab Units 11/30/22  0628 11/29/22  0606   SODIUM mmol/L 139 139   POTASSIUM mmol/L 4 5 4 6   CHLORIDE mmol/L 98 98   CO2 mmol/L 38* 37*   BUN mg/dL 47* 51*   CREATININE mg/dL 2 61* 2 61*   ANION GAP mmol/L 3* 4   CALCIUM mg/dL 8 6 8 4   ALBUMIN g/dL  --  2 0*   TOTAL BILIRUBIN mg/dL  --  0 31   ALK PHOS U/L  --  59   ALT U/L  --  9*   AST U/L  --  6   GLUCOSE RANDOM mg/dL 205* 169*     Results from last 7 days   Lab Units 11/25/22  0809   INR  1 19     Results from last 7 days   Lab Units 11/30/22  1047 11/30/22  0619 11/29/22 2039 11/29/22  1609 11/29/22  1128 11/29/22  0604 11/28/22  2058 11/28/22  1714 11/28/22  1150 11/28/22  0834 11/27/22  2208 11/27/22  1637   POC GLUCOSE mg/dl 236* 203* 215* 342* 223* 167* 219* 145* 189* 188* 229* 215*         Results from last 7 days   Lab Units 11/25/22  0449   PROCALCITONIN ng/ml 0 64*       Lines/Drains:  Invasive Devices     Peripheral Intravenous Line  Duration           Peripheral IV 11/28/22 Dorsal (posterior); Left Forearm 1 day Drain  Duration           External Urinary Catheter Other (Comment) 2 days                      Imaging: No pertinent imaging reviewed      Recent Cultures (last 7 days):   Results from last 7 days   Lab Units 11/23/22  1804   GRAM STAIN RESULT  1+ Polys   BODY FLUID CULTURE, STERILE  No growth       Last 24 Hours Medication List:   Current Facility-Administered Medications   Medication Dose Route Frequency Provider Last Rate   • acetaminophen  650 mg Oral Q6H PRN Zainab Bentley PA-C     • apixaban  5 mg Oral BID Zainab Bentley PA-C     • aspirin  81 mg Oral Daily Slaly Alice Mcgee PA-C     • budesonide  0 5 mg Nebulization Q12H Zainab Bentley PA-C     • carvedilol  6 25 mg Oral BID With Meals Zainab Bentley PA-C     • fluticasone  1 puff Inhalation Q12H [facility] Sally Alice Mcgee PA-C     • formoterol  20 mcg Nebulization Q12H Sally Mcgee PA-C     • gabapentin  100 mg Oral TID Zainab Bentley PA-C     • insulin glargine  36 Units Subcutaneous HS Leigha Nichole DO     • insulin lispro  1-6 Units Subcutaneous TID AC Sally Jones PA-C     • insulin lispro  5 Units Subcutaneous TID With Meals Sally Mcgee PA-C     • ipratropium  0 5 mg Nebulization Q6H Sally Jones PA-C     • isosorbide mononitrate  30 mg Oral Daily Sally Mcgee PA-C     • levalbuterol  1 25 mg Nebulization Q6H Sally Jones PA-C     • ondansetron  4 mg Intravenous Q6H PRN Zainab Bentley PA-C     • pantoprazole  40 mg Oral BID AC Sally Jones PA-C     • pravastatin  80 mg Oral Daily With National Enriqueta Dominguez PA-C     • QUEtiapine  12 5 mg Oral HS Zainab Bentley PA-C     • senna-docusate sodium  1 tablet Oral HS Zainab Bentley PA-C     • tamsulosin  0 4 mg Oral Daily With National Enriqueta Dominguez PA-C     • torsemide  60 mg Oral Daily Jeniffer Brown MD          Today, Patient Was Seen By: Leigha Nichole DO    **Please Note: This note may have been constructed using a voice recognition system  **

## 2022-11-30 NOTE — ASSESSMENT & PLAN NOTE
Lab Results   Component Value Date    HGBA1C 9 7 (H) 10/10/2022       Recent Labs     11/29/22  1609 11/29/22 2039 11/30/22  0619 11/30/22  1047   POCGLU 342* 215* 203* 236*       Blood Sugar Average: Last 72 hrs:  (P) 013 [id number]     Home regimen includes 30 units at bedtime, 10 units with meals  Will increase Lantus to 36U at bedtime, c/w 5 units Humalog with meals, still continue sliding scale  Add DM modifier to diet

## 2022-11-30 NOTE — ASSESSMENT & PLAN NOTE
Sepsis as evidenced by leukocytosis, tachypnea, secondary to suspected pneumonia and Pseudomonas bacteremia  ID apprecaited  Completed 10 days Cefepime/Fortaz

## 2022-11-30 NOTE — PLAN OF CARE
Problem: MOBILITY - ADULT  Goal: Maintain or return to baseline ADL function  Description: INTERVENTIONS:  -  Assess patient's ability to carry out ADLs; assess patient's baseline for ADL function and identify physical deficits which impact ability to perform ADLs (bathing, care of mouth/teeth, toileting, grooming, dressing, etc )  - Assess/evaluate cause of self-care deficits   - Assess range of motion  - Assess patient's mobility; develop plan if impaired  - Assess patient's need for assistive devices and provide as appropriate  - Encourage maximum independence but intervene and supervise when necessary  - Involve family in performance of ADLs  - Assess for home care needs following discharge   - Consider OT consult to assist with ADL evaluation and planning for discharge  - Provide patient education as appropriate  Outcome: Progressing  Goal: Maintains/Returns to pre admission functional level  Description: INTERVENTIONS:  - Perform BMAT or MOVE assessment daily    - Set and communicate daily mobility goal to care team and patient/family/caregiver  - Collaborate with rehabilitation services on mobility goals if consulted  - Perform Range of Motion  times a day  - Reposition patient every  hours    - Dangle patient  times a day  - Stand patient  times a day  - Ambulate patient  times a day  - Out of bed to chair  times a day   - Out of bed for meals  times a day  - Out of bed for toileting  - Record patient progress and toleration of activity level   Outcome: Progressing     Problem: Potential for Falls  Goal: Patient will remain free of falls  Description: INTERVENTIONS:  - Educate patient/family on patient safety including physical limitations  - Instruct patient to call for assistance with activity   - Consult OT/PT to assist with strengthening/mobility   - Keep Call bell within reach  - Keep bed low and locked with side rails adjusted as appropriate  - Keep care items and personal belongings within reach  - Initiate and maintain comfort rounds  - Make Fall Risk Sign visible to staff  - Offer Toileting every  Hours, in advance of need  - Initiate/Maintain alarm  - Obtain necessary fall risk management equipment:   - Apply yellow socks and bracelet for high fall risk patients  - Consider moving patient to room near nurses station  Outcome: Progressing     Problem: Prexisting or High Potential for Compromised Skin Integrity  Goal: Skin integrity is maintained or improved  Description: INTERVENTIONS:  - Identify patients at risk for skin breakdown  - Assess and monitor skin integrity  - Assess and monitor nutrition and hydration status  - Monitor labs   - Assess for incontinence   - Turn and reposition patient  - Assist with mobility/ambulation  - Relieve pressure over bony prominences  - Avoid friction and shearing  - Provide appropriate hygiene as needed including keeping skin clean and dry  - Evaluate need for skin moisturizer/barrier cream  - Collaborate with interdisciplinary team   - Patient/family teaching  - Consider wound care consult   Outcome: Progressing     Problem: RESPIRATORY - ADULT  Goal: Achieves optimal ventilation and oxygenation  Description: INTERVENTIONS:  - Assess for changes in respiratory status  - Assess for changes in mentation and behavior  - Position to facilitate oxygenation and minimize respiratory effort  - Oxygen administered by appropriate delivery if ordered  - Initiate smoking cessation education as indicated  - Encourage broncho-pulmonary hygiene including cough, deep breathe, Incentive Spirometry  - Assess the need for suctioning and aspirate as needed  - Assess and instruct to report SOB or any respiratory difficulty  - Respiratory Therapy support as indicated  Outcome: Progressing     Problem: PAIN - ADULT  Goal: Verbalizes/displays adequate comfort level or baseline comfort level  Description: Interventions:  - Encourage patient to monitor pain and request assistance  - Assess pain using appropriate pain scale  - Administer analgesics based on type and severity of pain and evaluate response  - Implement non-pharmacological measures as appropriate and evaluate response  - Consider cultural and social influences on pain and pain management  - Notify physician/advanced practitioner if interventions unsuccessful or patient reports new pain  Outcome: Progressing     Problem: INFECTION - ADULT  Goal: Absence or prevention of progression during hospitalization  Description: INTERVENTIONS:  - Assess and monitor for signs and symptoms of infection  - Monitor lab/diagnostic results  - Monitor all insertion sites, i e  indwelling lines, tubes, and drains  - Monitor endotracheal if appropriate and nasal secretions for changes in amount and color  - Lavon appropriate cooling/warming therapies per order  - Administer medications as ordered  - Instruct and encourage patient and family to use good hand hygiene technique  - Identify and instruct in appropriate isolation precautions for identified infection/condition  Outcome: Progressing  Goal: Absence of fever/infection during neutropenic period  Description: INTERVENTIONS:  - Monitor WBC    Outcome: Progressing     Problem: SAFETY ADULT  Goal: Maintain or return to baseline ADL function  Description: INTERVENTIONS:  -  Assess patient's ability to carry out ADLs; assess patient's baseline for ADL function and identify physical deficits which impact ability to perform ADLs (bathing, care of mouth/teeth, toileting, grooming, dressing, etc )  - Assess/evaluate cause of self-care deficits   - Assess range of motion  - Assess patient's mobility; develop plan if impaired  - Assess patient's need for assistive devices and provide as appropriate  - Encourage maximum independence but intervene and supervise when necessary  - Involve family in performance of ADLs  - Assess for home care needs following discharge   - Consider OT consult to assist with ADL evaluation and planning for discharge  - Provide patient education as appropriate  Outcome: Progressing  Goal: Maintains/Returns to pre admission functional level  Description: INTERVENTIONS:  - Perform BMAT or MOVE assessment daily    - Set and communicate daily mobility goal to care team and patient/family/caregiver  - Collaborate with rehabilitation services on mobility goals if consulted  - Perform Range of Motion  times a day  - Reposition patient every  hours    - Dangle patient  times a day  - Stand patient  times a day  - Ambulate patient  times a day  - Out of bed to chair  times a day   - Out of bed for meals  times a day  - Out of bed for toileting  - Record patient progress and toleration of activity level   Outcome: Progressing  Goal: Patient will remain free of falls  Description: INTERVENTIONS:  - Educate patient/family on patient safety including physical limitations  - Instruct patient to call for assistance with activity   - Consult OT/PT to assist with strengthening/mobility   - Keep Call bell within reach  - Keep bed low and locked with side rails adjusted as appropriate  - Keep care items and personal belongings within reach  - Initiate and maintain comfort rounds  - Make Fall Risk Sign visible to staff  - Offer Toileting every  Hours, in advance of need  - Initiate/Maintain alarm  - Obtain necessary fall risk management equipment:  - Apply yellow socks and bracelet for high fall risk patients  - Consider moving patient to room near nurses station  Outcome: Progressing     Problem: DISCHARGE PLANNING  Goal: Discharge to home or other facility with appropriate resources  Description: INTERVENTIONS:  - Identify barriers to discharge w/patient and caregiver  - Arrange for needed discharge resources and transportation as appropriate  - Identify discharge learning needs (meds, wound care, etc )  - Arrange for interpretive services to assist at discharge as needed  - Refer to Case Management Department for coordinating discharge planning if the patient needs post-hospital services based on physician/advanced practitioner order or complex needs related to functional status, cognitive ability, or social support system  Outcome: Progressing     Problem: Knowledge Deficit  Goal: Patient/family/caregiver demonstrates understanding of disease process, treatment plan, medications, and discharge instructions  Description: Complete learning assessment and assess knowledge base  Interventions:  - Provide teaching at level of understanding  - Provide teaching via preferred learning methods  Outcome: Progressing     Problem: Nutrition/Hydration-ADULT  Goal: Nutrient/Hydration intake appropriate for improving, restoring or maintaining nutritional needs  Description: Monitor and assess patient's nutrition/hydration status for malnutrition  Collaborate with interdisciplinary team and initiate plan and interventions as ordered  Monitor patient's weight and dietary intake as ordered or per policy  Utilize nutrition screening tool and intervene as necessary  Determine patient's food preferences and provide high-protein, high-caloric foods as appropriate       INTERVENTIONS:  - Monitor oral intake, urinary output, labs, and treatment plans  - Assess nutrition and hydration status and recommend course of action  - Evaluate amount of meals eaten  - Assist patient with eating if necessary   - Allow adequate time for meals  - Recommend/ encourage appropriate diets, oral nutritional supplements, and vitamin/mineral supplements  - Order, calculate, and assess calorie counts as needed  - Recommend, monitor, and adjust tube feedings and TPN/PPN based on assessed needs  - Assess need for intravenous fluids  - Provide specific nutrition/hydration education as appropriate  - Include patient/family/caregiver in decisions related to nutrition  Outcome: Progressing

## 2022-11-30 NOTE — ASSESSMENT & PLAN NOTE
Lab Results   Component Value Date    EGFR 22 11/30/2022    EGFR 22 11/29/2022    EGFR 22 11/28/2022    CREATININE 2 61 (H) 11/30/2022    CREATININE 2 61 (H) 11/29/2022    CREATININE 2 61 (H) 11/28/2022   Estimated Creatinine Clearance: 30 4 mL/min (A) (by C-G formula based on SCr of 2 61 mg/dL (H))    Creatinine baseline seems to be around 1 8-2  MARANDA evolving on admission  Peaked at 3 2 and now stable around 2 6 w/ diuresis  Renal appreciated

## 2022-11-30 NOTE — ASSESSMENT & PLAN NOTE
• Patient with documented chronic recurrent pleural effusion on the right   • S/p Thoracentesis on 11/08/22  ? Cytology: Minute cluster of atypical cells noted in cellblock material only  ? Atypical cluster is favored to represent contamination during specimen processing; however, given the patient's history of lung adenocarcinoma, involvement by malignancy cannot be definitively ruled out  ?  Consider repeat thoracentesis with cytology based on CT chest

## 2022-11-30 NOTE — ASSESSMENT & PLAN NOTE
Acute on chronic respiratory failure  Patient admitted for acute heart failure exacerbation initial requiring 50 L high-flow now weaned down to 6 L nasal cannula  Now down to baseline 2L  Likely CHFand PNA  Today pt appears slightly tachypnic  VBG chronic resp acidosis  CXR right lung still w/ opacity    Will check CT chest to clarfiy and if effusion still present, will ask IR for thora including cytology

## 2022-11-30 NOTE — PROGRESS NOTES
NEPHROLOGY PROGRESS NOTE   Shaheen Gill  78 y o  male MRN: [id number]  Unit/Bed#: [facility] 531-01 Encounter: [id number]  Reason for Consult: MARANDA CKD    ASSESSMENT AND PLAN:  MARANDA on CKD stage 4, baseline creatinine 2 5 to 2 7  -peak creatinine 3 2 overall improved and seems to have plateaued 2 6 today  -MARANDA suspect secondary to component of cardiorenal, bacteremia, suspected UTI, contributing to ATN  -may have to accept higher creatinine to keep patient euvolemic   -diuretics as below  -recent UA showed innumerable WBCs, 20 to 30 RBCs, 1+ proteinuria (in the setting of suspected UTI), will need eventual repeat UA with microscopy  -BMP in a m   -renal ultrasound shows no hydro, normal size kidneys, normal echogenicity      Acute on chronic CHF  -echo in May 2022 shows EF 91%, normal diastolic function  -currently remains on 2-3L O2 via nasal cannula which is his chronic requirement   Overall volume status seems to be improving   -weight fluctuating,? Accurate bed scale   -outpatient regimen includes Lasix 40 mg daily, will change IV Bumex to torsemide 60 mg daily   -continue intake and output monitoring, daily weight     Anemia in CKD, continue to monitor hemoglobin, transfuse p r n  For hemoglobin less than seven  -recent FOBT positive, component of GI bleed     Pseudomonas bacteremia, suspected UTI,?  Pneumonia, on antibiotic as per ID     Acute on chronic hypoxic/hypercapnic respiratory failure, BiPAP at nighttime  -currently on 2 L O2 via nasal cannula   Management as per primary team  -overall slowly improving     Encephalopathy, slowly improving continue to monitor     Discussed above plan in detail with primary team    SUBJECTIVE:  Patient seen and examined at bedside    Denies chest pain, worsening shortness of breath, nausea vomiting    OBJECTIVE:  Current Weight: Weight - Scale: 103 kg (228 lb)  Vitals:    11/30/22 1344   BP:    Pulse:    Resp:    Temp:    SpO2: 99%       Intake/Output Summary (Last 24 hours) at 11/30/2022 1349  Last data filed at 11/30/2022 1239  Gross per 24 hour   Intake 120 ml   Output 380 ml   Net -260 ml     Wt Readings from Last 3 Encounters:   11/30/22 103 kg (228 lb)   11/09/22 103 kg (227 lb 3 2 oz)   11/01/22 97 4 kg (214 lb 11 7 oz)     Temp Readings from Last 3 Encounters:   11/30/22 97 9 °F (36 6 °C) (Oral)   11/09/22 98 2 °F (36 8 °C) (Oral)   11/02/22 98 5 °F (36 9 °C)     BP Readings from Last 3 Encounters:   11/30/22 149/71   11/09/22 (!) 118/46   11/02/22 140/58     Pulse Readings from Last 3 Encounters:   11/30/22 67   11/09/22 65   11/02/22 94        Physical Examination:  General:  Lying in bed, no acute distress   Eyes:  Mild conjunctival pallor present  ENT:  External examination of ears and nose unremarkable  Neck:  No obvious lymphadenopathy appreciated  Respiratory:  Bilateral air entry present  CVS:  S1, S2 present  GI:  Soft, nondistended  CNS:  Active alert oriented x3  Skin:  No new rash  Musculoskeletal:  No obvious new gross deformity noted    Medications:    Current Facility-Administered Medications:   •  acetaminophen (TYLENOL) tablet 650 mg, 650 mg, Oral, Q6H PRN, Arianne Huynh PA-C  •  apixaban (ELIQUIS) tablet 5 mg, 5 mg, Oral, BID, Sally Jones PA-C, 5 mg at 11/30/22 9670  •  aspirin (ECOTRIN LOW STRENGTH) EC tablet 81 mg, 81 mg, Oral, Daily, Sally Martinez PA-C, 81 mg at 11/30/22 0941  •  budesonide (PULMICORT) inhalation solution 0 5 mg, 0 5 mg, Nebulization, Q12H, Sally Jones PA-C, 0 5 mg at 11/30/22 2251  •  bumetanide (BUMEX) injection 1 mg, 1 mg, Intravenous, BID, Sally Jones PA-C, 1 mg at 11/30/22 0459  •  carvedilol (COREG) tablet 6 25 mg, 6 25 mg, Oral, BID With Meals, Arianne Huynh PA-C, 6 25 mg at 11/30/22 0941  •  fluticasone (FLOVENT HFA) 110 MCG/ACT inhaler 1 puff, 1 puff, Inhalation, Q12H [address], Sally Jones PA-C, 1 puff at 11/30/22 0943  •  formoterol (PERFOROMIST) nebulizer solution 20 mcg, 20 mcg, Nebulization, Q12H, Alyce Miguel PA-C, 20 mcg at 11/30/22 5005  •  gabapentin (NEURONTIN) capsule 100 mg, 100 mg, Oral, TID, Sally Jones PA-C, 100 mg at 11/30/22 0941  •  insulin glargine (LANTUS) subcutaneous injection 36 Units 0 36 mL, 36 Units, Subcutaneous, HS, Rencortney Yoandy, DO  •  insulin lispro (HumaLOG) 100 units/mL subcutaneous injection 1-6 Units, 1-6 Units, Subcutaneous, TID AC, 3 Units at 11/30/22 1210 **AND** Fingerstick Glucose (POCT), , , TID AC, Sally Jones PA-C  •  insulin lispro (HumaLOG) 100 units/mL subcutaneous injection 5 Units, 5 Units, Subcutaneous, TID With Meals, Alyce Miguel PA-C, 5 Units at 11/30/22 1209  •  ipratropium (ATROVENT) 0 02 % inhalation solution 0 5 mg, 0 5 mg, Nebulization, Q6H, Sally Jones PA-C, 0 5 mg at 11/30/22 1338  •  isosorbide mononitrate (IMDUR) 24 hr tablet 30 mg, 30 mg, Oral, Daily, Sally Jones PA-C, 30 mg at 11/30/22 5895  •  levalbuterol (XOPENEX) inhalation solution 1 25 mg, 1 25 mg, Nebulization, Q6H, Sally Jones PA-C, 1 25 mg at 11/30/22 1338  •  ondansetron (ZOFRAN) injection 4 mg, 4 mg, Intravenous, Q6H PRN, Alyce Miguel PA-C  •  pantoprazole (PROTONIX) EC tablet 40 mg, 40 mg, Oral, BID AC, Sally Jones PA-C, 40 mg at 11/30/22 0941  •  pravastatin (PRAVACHOL) tablet 80 mg, 80 mg, Oral, Daily With Dinner, Alyce Miguel PA-C, 80 mg at 11/29/22 1641  •  QUEtiapine (SEROquel) tablet 12 5 mg, 12 5 mg, Oral, HS, Sally Jones PA-C, 12 5 mg at 11/29/22 2133  •  senna-docusate sodium (SENOKOT S) 8 6-50 mg per tablet 1 tablet, 1 tablet, Oral, HS, Sally Jones PA-C, 1 tablet at 11/29/22 2133  •  tamsulosin (FLOMAX) capsule 0 4 mg, 0 4 mg, Oral, Daily With Dinner, Alyce Miguel PA-C, 0 4 mg at 11/29/22 1642    Laboratory Results:  Results from last 7 days   Lab Units 11/30/22  0628 11/29/22  0606 11/28/22  0455 11/27/22  2339 11/27/22  0518 11/26/22  0504 11/25/22  0809 11/25/22  0449 11/24/22  2034 11/24/22  1054 11/24/22  0607   WBC Thousand/uL 8 51 7 35 7 43  --  6 81 6 06 6 55 6 28  --   --  8 00   HEMOGLOBIN g/dL 7 6* 7 4* 7 6*  --  7 8* 7 4* 6 7* 7 1*  --   --  7 7*   HEMATOCRIT % 25 7* 26 0* 26 1*  --  26 2* 26 0* 21 6* 23 7*  --   --  27 9*   PLATELETS Thousands/uL 168 153 154  --  141* 136* 135* 135*  --   --  103*   SODIUM mmol/L 139 139 139  --  135 142  --  140 140  --  134*   POTASSIUM mmol/L 4 5 4 6 4 8 4 5 5 7* 4 3  --  4 6 4 8   < > 5 5*   CHLORIDE mmol/L 98 98 99  --  99 104  --  105 103  --  105   CO2 mmol/L 38* 37* 37*  --  31 32  --  35* 34*  --  21   BUN mg/dL 47* 51* 52*  --  54* 58*  --  59* 58*  --  61*   CREATININE mg/dL 2 61* 2 61* 2 61*  --  2 41* 2 58*  --  2 84* 3 04*  --  3 24*   CALCIUM mg/dL 8 6 8 4 8 2*  --  8 4 8 3  --  8 5 8 7  --  8 3   MAGNESIUM mg/dL  --  2 4 2 2  --  2 5 2 5  --  2 5 2 6  --  3 0*   PHOSPHORUS mg/dL  --  4 1 3 9  --  3 7 3 5  --  3 9 4 4*  --  5 0*    < > = values in this interval not displayed  XR chest portable   Final Result by Zahra Young MD (11/30 1035)      Stable volume loss and opacification of the right hemithorax possibly scarring and effusion  An element of atelectasis and/or pneumonia difficult to exclude  Workstation performed: [id number]         XR chest portable ICU   Final Result by Samra Barraza MD (11/28 8967)      Persistent right effusion and right base atelectasis  Pneumonia not excluded in the appropriate setting  Workstation performed: [id number]         XR chest portable ICU   Final Result by Gale Stein MD (11/25 9604)      Unchanged moderate right pleural effusion, and right hemithorax volume loss with right basilar atelectasis  Workstation performed: Victorine Goodpasture kidney and bladder   Final Result by Charles Fuller MD (11/25 7881)      No evidence of obstruction  Ascites  Cholelithiasis               Workstation performed: [id number]         CT head wo contrast   Final Result by Keshav Piña MD (11/24 1814)      No acute intracranial abnormality  Chronic microangiopathic changes  Workstation performed: [id number]         XR chest portable   Final Result by Obinna Sal MD (11/24 1515)         1  New rounded cavity in the left base raises concern for developing pneumonia  Otherwise, negative findings in the chest       The study was marked in [facility] for immediate notification  Workstation performed: [id number]         IR IN-Patient Thoracentesis   Final Result by Zohaib Romo MD (11/23 1918)   Impression:   1  Successful ultrasound-guided thoracentesis yielding 900 mL of serosanguineous pleural fluid  Workstation performed: [id number]         XR chest portable ICU   Final Result by Mayank Nunez MD (11/21 1014)      Moderate right pleural effusion with associated right lung base opacity  Workstation performed: [id number]         CT chest wo contrast   Final Result by Bessy Chandra MD (11/20 1758)      1  Increase in moderate right pleural effusion with new small left effusion  2   New near complete occlusion of middle lobe and right lower lobe bronchial branches with atelectasis  3  New small perihepatic ascites  The study was marked in [facility] for immediate notification  Workstation performed: [id number]         XR chest 1 view portable   Final Result by Sam Archer MD (11/20 2056)      Increased size right pleural effusion    Workstation performed: [id number]             Portions of the record may have been created with voice recognition software  Occasional wrong word or "sound a like" substitutions may have occurred due to the inherent limitations of voice recognition software  Read the chart carefully and recognize, using context, where substitutions have occurred

## 2022-11-30 NOTE — ASSESSMENT & PLAN NOTE
Wt Readings from Last 3 Encounters:   11/30/22 103 kg (228 lb)   11/09/22 103 kg (227 lb 3 2 oz)   11/01/22 97 4 kg (214 lb 11 7 oz)       Patient with history of chronic diastolic heart failure  Presenting with shortness of breath worsening leg swelling for the past day  Patient examined volume overloaded, crackles on exam, lower extremity edema  IV Bumex -> Torsemide  Home dose was 40 mg Lasix daily  Fluid restriction, sodium restriction  Monitor urine output, check daily weight

## 2022-12-01 LAB
ANION GAP SERPL CALCULATED.3IONS-SCNC: 3 MMOL/L (ref 4–13)
BUN SERPL-MCNC: 47 MG/DL (ref 5–25)
CALCIUM SERPL-MCNC: 8.6 MG/DL (ref 8.3–10.1)
CHLORIDE SERPL-SCNC: 96 MMOL/L (ref 96–108)
CO2 SERPL-SCNC: 39 MMOL/L (ref 21–32)
CREAT SERPL-MCNC: 2.43 MG/DL (ref 0.6–1.3)
ERYTHROCYTE [DISTWIDTH] IN BLOOD BY AUTOMATED COUNT: 15 % (ref 11.6–15.1)
GFR SERPL CREATININE-BSD FRML MDRD: 25 ML/MIN/1.73SQ M
GLUCOSE SERPL-MCNC: 131 MG/DL (ref 65–140)
GLUCOSE SERPL-MCNC: 139 MG/DL (ref 65–140)
GLUCOSE SERPL-MCNC: 165 MG/DL (ref 65–140)
GLUCOSE SERPL-MCNC: 174 MG/DL (ref 65–140)
GLUCOSE SERPL-MCNC: 248 MG/DL (ref 65–140)
HCT VFR BLD AUTO: 24.2 % (ref 36.5–49.3)
HGB BLD-MCNC: 7.1 G/DL (ref 12–17)
MCH RBC QN AUTO: 24.1 PG (ref 26.8–34.3)
MCHC RBC AUTO-ENTMCNC: 28.5 G/DL (ref 31.4–37.4)
MCV RBC AUTO: 85 FL (ref 82–98)
PLATELET # BLD AUTO: 174 THOUSANDS/UL (ref 149–390)
PMV BLD AUTO: 11.4 FL (ref 8.9–12.7)
POTASSIUM SERPL-SCNC: 4.4 MMOL/L (ref 3.5–5.3)
RBC # BLD AUTO: 2.86 MILLION/UL (ref 3.88–5.62)
SODIUM SERPL-SCNC: 138 MMOL/L (ref 135–147)
WBC # BLD AUTO: 9.02 THOUSAND/UL (ref 4.31–10.16)

## 2022-12-01 RX ADMIN — PANTOPRAZOLE SODIUM 40 MG: 40 TABLET, DELAYED RELEASE ORAL at 16:42

## 2022-12-01 RX ADMIN — ACETAMINOPHEN 650 MG: 325 TABLET ORAL at 16:44

## 2022-12-01 RX ADMIN — FLUTICASONE PROPIONATE 1 PUFF: 110 AEROSOL, METERED RESPIRATORY (INHALATION) at 08:49

## 2022-12-01 RX ADMIN — INSULIN LISPRO 5 UNITS: 100 INJECTION, SOLUTION INTRAVENOUS; SUBCUTANEOUS at 08:49

## 2022-12-01 RX ADMIN — ASPIRIN 81 MG: 81 TABLET, COATED ORAL at 08:45

## 2022-12-01 RX ADMIN — GABAPENTIN 100 MG: 100 CAPSULE ORAL at 16:42

## 2022-12-01 RX ADMIN — PRAVASTATIN SODIUM 80 MG: 80 TABLET ORAL at 16:42

## 2022-12-01 RX ADMIN — IPRATROPIUM BROMIDE 0.5 MG: 0.5 SOLUTION RESPIRATORY (INHALATION) at 20:16

## 2022-12-01 RX ADMIN — IRON SUCROSE 300 MG: 20 INJECTION, SOLUTION INTRAVENOUS at 10:59

## 2022-12-01 RX ADMIN — BUDESONIDE 0.5 MG: 0.5 INHALANT ORAL at 08:20

## 2022-12-01 RX ADMIN — APIXABAN 5 MG: 5 TABLET, FILM COATED ORAL at 08:45

## 2022-12-01 RX ADMIN — CARVEDILOL 6.25 MG: 6.25 TABLET, FILM COATED ORAL at 16:42

## 2022-12-01 RX ADMIN — IPRATROPIUM BROMIDE 0.5 MG: 0.5 SOLUTION RESPIRATORY (INHALATION) at 01:47

## 2022-12-01 RX ADMIN — TAMSULOSIN HYDROCHLORIDE 0.4 MG: 0.4 CAPSULE ORAL at 16:42

## 2022-12-01 RX ADMIN — LEVALBUTEROL HYDROCHLORIDE 1.25 MG: 1.25 SOLUTION, CONCENTRATE RESPIRATORY (INHALATION) at 01:47

## 2022-12-01 RX ADMIN — CARVEDILOL 6.25 MG: 6.25 TABLET, FILM COATED ORAL at 08:45

## 2022-12-01 RX ADMIN — BUDESONIDE 0.5 MG: 0.5 INHALANT ORAL at 20:16

## 2022-12-01 RX ADMIN — IPRATROPIUM BROMIDE 0.5 MG: 0.5 SOLUTION RESPIRATORY (INHALATION) at 13:25

## 2022-12-01 RX ADMIN — LEVALBUTEROL HYDROCHLORIDE 1.25 MG: 1.25 SOLUTION, CONCENTRATE RESPIRATORY (INHALATION) at 08:20

## 2022-12-01 RX ADMIN — FORMOTEROL FUMARATE DIHYDRATE 20 MCG: 20 SOLUTION RESPIRATORY (INHALATION) at 20:16

## 2022-12-01 RX ADMIN — INSULIN LISPRO 1 UNITS: 100 INJECTION, SOLUTION INTRAVENOUS; SUBCUTANEOUS at 08:46

## 2022-12-01 RX ADMIN — LEVALBUTEROL HYDROCHLORIDE 1.25 MG: 1.25 SOLUTION, CONCENTRATE RESPIRATORY (INHALATION) at 20:16

## 2022-12-01 RX ADMIN — LEVALBUTEROL HYDROCHLORIDE 1.25 MG: 1.25 SOLUTION, CONCENTRATE RESPIRATORY (INHALATION) at 13:25

## 2022-12-01 RX ADMIN — GABAPENTIN 100 MG: 100 CAPSULE ORAL at 08:45

## 2022-12-01 RX ADMIN — SENNOSIDES AND DOCUSATE SODIUM 1 TABLET: 8.6; 5 TABLET ORAL at 21:05

## 2022-12-01 RX ADMIN — APIXABAN 5 MG: 5 TABLET, FILM COATED ORAL at 17:10

## 2022-12-01 RX ADMIN — ISOSORBIDE MONONITRATE 30 MG: 30 TABLET, EXTENDED RELEASE ORAL at 08:45

## 2022-12-01 RX ADMIN — TORSEMIDE 60 MG: 20 TABLET ORAL at 08:45

## 2022-12-01 RX ADMIN — FLUTICASONE PROPIONATE 1 PUFF: 110 AEROSOL, METERED RESPIRATORY (INHALATION) at 21:05

## 2022-12-01 RX ADMIN — GABAPENTIN 100 MG: 100 CAPSULE ORAL at 21:05

## 2022-12-01 RX ADMIN — QUETIAPINE FUMARATE 12.5 MG: 25 TABLET ORAL at 21:06

## 2022-12-01 RX ADMIN — PANTOPRAZOLE SODIUM 40 MG: 40 TABLET, DELAYED RELEASE ORAL at 08:45

## 2022-12-01 RX ADMIN — IPRATROPIUM BROMIDE 0.5 MG: 0.5 SOLUTION RESPIRATORY (INHALATION) at 08:20

## 2022-12-01 RX ADMIN — INSULIN GLARGINE 36 UNITS: 100 INJECTION, SOLUTION SUBCUTANEOUS at 21:05

## 2022-12-01 NOTE — OCCUPATIONAL THERAPY NOTE
Occupational Therapy Progress Note     Patient Name: Liane Pierce  Today's Date: 12/1/2022  Problem List  Principal Problem:    Acute on chronic respiratory failure with hypoxia and hypercapnia (HCC)  Active Problems:    COPD (chronic obstructive pulmonary disease) (HCC)    Type 2 diabetes mellitus with hyperglycemia, with long-term current use of insulin (HCC)    Pleural effusion on right    CAD (coronary artery disease)    Adenocarcinoma of lung (HCC)    Acute kidney injury superimposed on chronic kidney disease ([facility])    A-fib ([facility])    Anemia    Sepsis [facility] )    Pneumonia    Acute on chronic diastolic heart failure ([facility] )    Neurocognitive disorder    Bacteremia due to Pseudomonas        12/01/22 1033   OT Last Visit   OT Visit Date 12/01/22   Note Type   Note Type Treatment   Pain Assessment   Pain Assessment Tool 0-10   Pain Score No Pain   Restrictions/Precautions   Weight Bearing Precautions Per Order No   Other Precautions Cognitive; Chair Alarm; Bed Alarm;Multiple lines; Fall Risk   ADL   Where Assessed Chair   Grooming Assistance 5  Supervision/Setup   Grooming Deficit Wash/dry face; Increased time to complete   Grooming Comments sat in bedside chair to complete grooming tasks, which were completed w S/set-up  UB Bathing Assistance 4  Minimal Assistance   UB Bathing Deficit Increased time to complete; Chest;Right arm;Left arm; Abdomen;Supervision/safety   UB Bathing Comments min A for washing back, otherwise pt able to wash all other aspects of UB and ebony deoderant  LB Bathing Assistance 3  Moderate Assistance   LB Bathing Deficit Setup; Increased time to complete;Perineal area; Buttocks;Right upper leg;Left lower leg including foot;Right lower leg including foot; Left upper leg   LB Bathing Comments pt able to manage perineal area and upper LEs, requires assistance for lower LE and feet  UB Dressing Assistance 4  Minimal Assistance   UB Dressing Deficit Thread RUE; Thread LUE; Increased time to complete   LB Dressing Assistance 2  Maximal Assistance   LB Dressing Deficit Don/doff L sock; Don/doff R sock   LB Dressing Comments max A for doffing/donning socks  Bed Mobility   Additional Comments found and left in chair w all needs in reach and alarm on  Subjective   Subjective "I am doing ok"   Cognition   Overall Cognitive Status Impaired   Arousal/Participation Alert; Responsive; Cooperative   Attention Attends with cues to redirect   Orientation Level Oriented to person;Oriented to place; Disoriented to time;Disoriented to situation   Memory Decreased recall of precautions   Following Commands Follows one step commands with increased time or repetition   Comments pt pleasant and cooperative, does require VC for initiation of tasks  inc time required to follow one step commands  Activity Tolerance   Activity Tolerance Patient tolerated treatment well   Medical Staff Made Aware ok per RN   Assessment   Assessment Pt seen on this date for OT session focusing on ADL retraining, cognitive reorientation, body mechanics, transfer retraining, increasing activity tolerance/endurance to increase ability to participate in ADL/functional tasks  Pt was found in chair and was left in chair w/ all needs within reach, chair alarm on  Pt completed ADL tasks while seated in chair- grooming: S, UB ADL: min A, LB bathing: mod A, LB dressing: max A  Pt tolerated well, did require vc for task initiation  Requires inc time to complete tasks and follow one step commands  Pt w/ improvements in activity tolerance, ADL task completion, cognition, , however is still limited 2* decreased ADL/High-level ADL status, decreased activity tolerance/endurance, decreased cognition, decreased self-care trans, decreased safety awareness and insight to condition  The patient's raw score on the AM-PAC Daily Activity inpatient short form is 16, standardized score is 35 96, less than 39 4   Patients at this level are likely to benefit from discharge to post-acute rehabilitation services  Please refer to the recommendation of the Occupational Therapist for safe discharge planning  Recommending pt D/C to STR when medically stable  Pt will continue to benefit from acute OT services to meet goals  Plan   Treatment Interventions ADL retraining; Endurance training; Activityengagement; Energy conservation; Compensatory technique education   Goal Expiration Date 12/06/22   OT Treatment Day 3   OT Frequency   (2-4)   Recommendation   OT Discharge Recommendation Post acute rehabilitation services   AM-PAC Daily Activity Inpatient   Lower Body Dressing 2   Bathing 2   Toileting 2   Upper Body Dressing 3   Grooming 3   Eating 4   Daily Activity Raw Score 16   Daily Activity Standardized Score (Calc for Raw Score >=11) 35 96   AM-PAC Applied Cognition Inpatient   Following a Speech/Presentation 3   Understanding Ordinary Conversation 3   Taking Medications 2   Remembering Where Things Are Placed or Put Away 2   Remembering List of 4-5 Errands 2   Taking Care of Complicated Tasks 1   Applied Cognition Raw Score 13   Applied Cognition Standardized Score 30 46   Modified Pelican Scale   Modified [place] Scale 4   End of Consult   Education Provided Yes   Patient Position at End of Consult Bedside chair;Bed/Chair alarm activated; All needs within reach   Nurse Communication Nurse aware of consult       BURAK Velasquez, OTR/L

## 2022-12-01 NOTE — CASE MANAGEMENT
Support Center received request for authorization from Care Manager  Authorization request for: SNF  [facility] Name: [facility]  NPI: [id number]  Facility MD:  Dr Farzana Awad: [id number]  Authorization initiated by contacting: Lay Cosby: Phone  Pending Reference #: [id number]  Clinicals submitted via:  Fax

## 2022-12-01 NOTE — ASSESSMENT & PLAN NOTE
Lab Results   Component Value Date    HGBA1C 9 7 (H) 10/10/2022       Recent Labs     11/30/22  1647 11/30/22 2038 12/01/22  0609 12/01/22  1054   POCGLU 209* 215* 165* 131       Blood Sugar Average: Last 72 hrs:  (P) 382 [id number]     Home regimen includes 30 units at bedtime, 10 units with meals  Will c/w Lantus 36U at bedtime, c/w 5 units Humalog with meals, still continue sliding scale  DM diet

## 2022-12-01 NOTE — PHYSICAL THERAPY NOTE
PHYSICAL THERAPY NOTE          Patient Name: Smita Palma  Today's Date: 12/1/2022 12/01/22 0954   PT Last Visit   PT Visit Date 12/01/22   Note Type   Note Type Treatment   Pain Assessment   Pain Assessment Tool 0-10   Pain Score No Pain   Restrictions/Precautions   Weight Bearing Precautions Per Order No   Other Precautions Chair Alarm; Bed Alarm;Multiple lines; Fall Risk;Telemetry;O2   General   Chart Reviewed Yes   Response to Previous Treatment Patient with no complaints from previous session  Family/Caregiver Present No   Cognition   Arousal/Participation Alert   Attention Attends with cues to redirect   Memory Decreased recall of precautions   Following Commands Follows one step commands without difficulty   Comments Some decreased safety awareness and insight  Subjective   Subjective Pleasant and agreeable to participate in therapy sesison  Bed Mobility   Supine to Sit 5  Supervision   Additional items HOB elevated; Increased time required;Verbal cues   Additional Comments Left OOB in chair with chair alarm intact and all needs in reach  Transfers   Sit to Stand 4  Minimal assistance   Additional items Assist x 1; Increased time required;Verbal cues   Stand to Sit 4  Minimal assistance   Additional items Assist x 1; Increased time required;Verbal cues   Additional Comments with    VC for hand placement and safety  STS performed multiple times throughout session  Ambulation/Elevation   Gait pattern Excessively slow; Short stride; Foward flexed;Decreased foot clearance; Improper Weight shift; Ataxia   Gait Assistance 3  Moderate assist   Additional items Assist x 1;Verbal cues; Tactile cues  (second for lines and chair follow)   Assistive Device Rolling walker   Distance 25 ft x 1, 45 ft x 1, 45 ft x 1 with sitting rest breaks   Balance   Static Sitting Fair   Dynamic Sitting Fair -   Static Standing Fair - Dynamic Standing Poor +   Ambulatory Poor +   Endurance Deficit   Endurance Deficit Yes   Endurance Deficit Description fatigue, weakness   Activity Tolerance   Activity Tolerance Patient limited by fatigue   Medical Staff Made Aware restorative tech Hilton Rodriguez   Nurse Made Aware RN cleared pt to be seen by PT   Exercises   Neuro re-ed Educated pt on HEP with handout: hip flexion, knee extension, ankle pumps  Assessment   Prognosis Good   Problem List Decreased strength;Decreased endurance; Impaired balance;Decreased mobility; Decreased range of motion; Impaired judgement;Decreased safety awareness   Assessment Pt seen for PT treatment session with focus on bed mobility, functional transfers, functional mobility  Pt making steady progress toward goals this session  Pt continues to require assist for STS 2/2 LE strength deficits  Pt able to ambulate increased distance this session compared to last, however, remains very unsteady and at an increased risk for falls  Pt also fatigues quickly, requires frequent sitting rest breaks while ambulatin  Pt left upright in bedside chair with chair alarm intact and with all needs in reach  Pt will benefit from skilled therapy in order to address current impairments and functional limitations  PT to follow pt and recommending rehab once medically cleared  The patient's AM-PAC Basic Mobility Inpatient Short Form Raw Score is 16  A Raw score of less than or equal to 16 suggests the patient may benefit from discharge to post-acute rehabilitation services  Please also refer to the recommendation of the Physical Therapist for safe discharge planning  Barriers to Discharge Inaccessible home environment;Decreased caregiver support   Goals   Patient Goals to find his credit card   STG Expiration Date 12/13/22   Plan   Treatment/Interventions Functional transfer training;LE strengthening/ROM; Therapeutic exercise; Endurance training;Patient/family training;Equipment eval/education; Bed mobility;Gait training;Spoke to nursing   Progress Progressing toward goals   PT Frequency 3-5x/wk   Recommendation   PT Discharge Recommendation Post acute rehabilitation services   Equipment Recommended [facility] Recommended Wheeled walker   Change/add to [employer]?  No   AM-PAC Basic Mobility Inpatient   Turning in Bed Without Bedrails 3   Lying on Back to Sitting on Edge of Flat Bed 3   Moving Bed to Chair 3   Standing Up From Chair 3   Walk in Room 2   Climb 3-5 Stairs 2   Basic Mobility Inpatient Raw Score 16   Basic Mobility Standardized Score 38 32   Highest Level Of Mobility   JH-HLM Goal 5: Stand one or more mins   JH-HLM Achieved 7: Walk 25 feet or more     Brie Thorpe, PT, DPT

## 2022-12-01 NOTE — ASSESSMENT & PLAN NOTE
Acute on chronic respiratory failure  Patient admitted for acute heart failure exacerbation initial requiring 50 L high-flow now weaned down to 6 L nasal cannula  Now down to baseline 2L  Likely CHFand PNA  11/30 pt appears slightly tachypnic  VBG chronic resp acidosis  CXR right lung still w/ opacity  CT chest showed unchanged effusion  Today pt comfortable    Likely pt will need thoras prn comfort

## 2022-12-01 NOTE — PROGRESS NOTES
[facility] [facility]  Progress Note - Ric Crawford Sr  1945, 78 y o  male MRN: [id number]  Unit/Bed#: [facility] 531-01 Encounter: [id number]  Primary Care Provider: Suze Freitas MD   Date and time admitted to hospital: 11/20/2022 10:40 AM    * Acute on chronic respiratory failure with hypoxia and hypercapnia [facility])  Assessment & Plan  Acute on chronic respiratory failure  Patient admitted for acute heart failure exacerbation initial requiring 50 L high-flow now weaned down to 6 L nasal cannula  Now down to baseline 2L  Likely CHFand PNA  11/30 pt appears slightly tachypnic  VBG chronic resp acidosis  CXR right lung still w/ opacity  CT chest showed unchanged effusion  Today pt comfortable  Likely pt will need thoras prn comfort    Acute on chronic diastolic heart failure (HCC)  Assessment & Plan  Wt Readings from Last 3 Encounters:   12/01/22 103 kg (226 lb 14 4 oz)   11/09/22 103 kg (227 lb 3 2 oz)   11/01/22 97 4 kg (214 lb 11 7 oz)       Patient with history of chronic diastolic heart failure  Presenting with shortness of breath worsening leg swelling for the past day  Patient examined volume overloaded, crackles on exam, lower extremity edema  IV Bumex -> Torsemide  Home dose was 40 mg Lasix daily  Fluid restriction, sodium restriction  Monitor urine output, check daily weight      Sepsis ([facility] [address] )  Assessment & Plan  Sepsis as evidenced by leukocytosis, tachypnea, secondary to suspected pneumonia and Pseudomonas bacteremia  ID apprecaited  Completed 10 days Cefepime/Fortaz    Bacteremia due to Pseudomonas  Assessment & Plan  · C/w Cliff Galindo  · Likely 2/2 UTI    Pleural effusion on right  Assessment & Plan  • Patient with documented chronic recurrent pleural effusion on the right   • S/p Thoracentesis on 11/08/22  ? Cytology: Minute cluster of atypical cells noted in cellblock material only  ?  Atypical cluster is favored to represent contamination during specimen processing; however, given the patient's history of lung adenocarcinoma, involvement by malignancy cannot be definitively ruled out  · Previous cytology from fluid negative for malignancy  · Pt still has effusion but asymptomatic  Can have OP thoras prn SOB    Neurocognitive disorder  Assessment & Plan  · Neuropsych appreciated  · Pt not competent    Pneumonia  Assessment & Plan  · ID appreciated  · Completed 10 days Cefepime/Fortaz    Anemia  Assessment & Plan  · FOBT pos 10/19  · Hgb stable overall  · OP f/u w/ GI  · Will give Venofer today  · If Hgb below 7, will need 1U PRBC    A-fib (HCC)  Assessment & Plan  Continue Eliquis and carvedilol    Acute kidney injury superimposed on chronic kidney disease [facility])  Assessment & Plan  Lab Results   Component Value Date    EGFR 25 12/01/2022    EGFR 22 11/30/2022    EGFR 22 11/29/2022    CREATININE 2 43 (H) 12/01/2022    CREATININE 2 61 (H) 11/30/2022    CREATININE 2 61 (H) 11/29/2022   Estimated Creatinine Clearance: 32 6 mL/min (A) (by C-G formula based on SCr of 2 43 mg/dL (H))    Creatinine baseline seems to be around 1 8-2  MARANDA evolving on admission  Peaked at 3 2 and now trending down  Renal appreciated    Adenocarcinoma of lung [facility])  Assessment & Plan  Patient with history of adeno carcinoma status post radiation therapy    CAD (coronary artery disease)  Assessment & Plan  Patient with history of CAD with stenting in 2015  Stress test in May of this year negative  Will continue aspirin, statin, beta-blocker    Type 2 diabetes mellitus with hyperglycemia, with long-term current use of insulin [facility])  Assessment & Plan  Lab Results   Component Value Date    HGBA1C 9 7 (H) 10/10/2022       Recent Labs     11/30/22  1647 11/30/22  2038 12/01/22  0609 12/01/22  1054   POCGLU 209* 215* 165* 131       Blood Sugar Average: Last 72 hrs:  (P) 203 [id number]     Home regimen includes 30 units at bedtime, 10 units with meals  Will c/w Lantus 36U at bedtime, c/w 5 units Humalog with meals, still continue sliding scale  DM diet    COPD (chronic obstructive pulmonary disease) ([facility] [address] )  Assessment & Plan  Patient with history of COPD on 2 L nasal cannula baseline  Do not believe patient has a COPD exacerbation at this time  Currently on baseline 2L      VTE Pharmacologic Prophylaxis: VTE Score: 10 High Risk (Score >/= 5) - Pharmacological DVT Prophylaxis Ordered: apixaban (Eliquis)  Sequential Compression Devices Ordered  Patient Centered Rounds: I performed bedside rounds with nursing staff today  Discussions with Specialists or Other Care Team Provider: not yet    Education and Discussions with Family / Patient: Attempted to update  (son) via phone  Left voicemail  Time Spent for Care: 30 minutes  More than 50% of total time spent on counseling and coordination of care as described above  Current Length of Stay: 11 day(s)  Current Patient Status: Inpatient   Certification Statement: The patient will continue to require additional inpatient hospital stay due to need for Venofer  Discharge Plan: Anticipate discharge tomorrow to rehab facility  Code Status: Level 1 - Full Code    Subjective:   No acute complaints    Objective:     Vitals:   Temp (24hrs), Av 2 °F (36 8 °C), Min:97 1 °F (36 2 °C), Max:99 3 °F (37 4 °C)    Temp:  [97 1 °F (36 2 °C)-99 3 °F (37 4 °C)] 97 1 °F (36 2 °C)  HR:  [66-76] 66  Resp:  [18] 18  BP: (112-118)/(54-56) 112/55  SpO2:  [92 %-100 %] 100 %  Body mass index is 30 77 kg/m²  Input and Output Summary (last 24 hours): Intake/Output Summary (Last 24 hours) at 2022 1448  Last data filed at 2022 1219  Gross per 24 hour   Intake 780 ml   Output 2000 ml   Net -1220 ml       Physical Exam:   Physical Exam  HENT:      Head: Normocephalic and atraumatic  Nose: Nose normal       Mouth/Throat:      Mouth: Mucous membranes are moist    Eyes:      Extraocular Movements: Extraocular movements intact        Conjunctiva/sclera: Conjunctivae normal    Cardiovascular:      Rate and Rhythm: Normal rate and regular rhythm  Pulmonary:      Effort: Pulmonary effort is normal       Comments: Decreased BS  Abdominal:      General: Bowel sounds are normal       Palpations: Abdomen is soft  Musculoskeletal:         General: Normal range of motion  Cervical back: Normal range of motion and neck supple  Right lower leg: No edema  Left lower leg: No edema  Skin:     General: Skin is warm and dry  Neurological:      Mental Status: He is alert and oriented to person, place, and time  Additional Data:     Labs:  Results from last 7 days   Lab Units 12/01/22  0437 11/28/22  0455 11/27/22  0518   WBC Thousand/uL 9 02   < > 6 81   HEMOGLOBIN g/dL 7 1*   < > 7 8*   HEMATOCRIT % 24 2*   < > 26 2*   PLATELETS Thousands/uL 174   < > 141*   NEUTROS PCT %  --   --  72   LYMPHS PCT %  --   --  13*   MONOS PCT %  --   --  12   EOS PCT %  --   --  2    < > = values in this interval not displayed  Results from last 7 days   Lab Units 12/01/22  0437 11/30/22  0628 11/29/22  0606   SODIUM mmol/L 138   < > 139   POTASSIUM mmol/L 4 4   < > 4 6   CHLORIDE mmol/L 96   < > 98   CO2 mmol/L 39*   < > 37*   BUN mg/dL 47*   < > 51*   CREATININE mg/dL 2 43*   < > 2 61*   ANION GAP mmol/L 3*   < > 4   CALCIUM mg/dL 8 6   < > 8 4   ALBUMIN g/dL  --   --  2 0*   TOTAL BILIRUBIN mg/dL  --   --  0 31   ALK PHOS U/L  --   --  59   ALT U/L  --   --  9*   AST U/L  --   --  6   GLUCOSE RANDOM mg/dL 174*   < > 169*    < > = values in this interval not displayed       Results from last 7 days   Lab Units 11/25/22  0809   INR  1 19     Results from last 7 days   Lab Units 12/01/22  1054 12/01/22  0609 11/30/22 2038 11/30/22  1647 11/30/22  1047 11/30/22  0619 11/29/22  2039 11/29/22  1609 11/29/22  1128 11/29/22  0604 11/28/22 2058 11/28/22  1714   POC GLUCOSE mg/dl 131 165* 215* 209* 236* 203* 215* 342* 223* 167* 219* 145*         Results from last 7 days   Lab Units 11/25/22  0449   PROCALCITONIN ng/ml 0 64*       Lines/Drains:  Invasive Devices     Peripheral Intravenous Line  Duration           Peripheral IV 11/28/22 Dorsal (posterior); Left Forearm 2 days          Drain  Duration           External Urinary Catheter Other (Comment) 3 days                      Imaging: No pertinent imaging reviewed      Recent Cultures (last 7 days):         Last 24 Hours Medication List:   Current Facility-Administered Medications   Medication Dose Route Frequency Provider Last Rate   • acetaminophen  650 mg Oral Q6H PRN Clemetine RICK Hutson     • apixaban  5 mg Oral BID Clemetine RICK Hutson     • aspirin  81 mg Oral Daily Sally Appiah PA-C     • budesonide  0 5 mg Nebulization Q12H Clemetine RICK Hutson     • carvedilol  6 25 mg Oral BID With Meals Catalina Hutson PA-C     • fluticasone  1 puff Inhalation Q12H [facility] Sally Appiah PA-C     • formoterol  20 mcg Nebulization Q12H Catalina Hutson PA-C     • gabapentin  100 mg Oral TID Catalina Hutson PA-C     • insulin glargine  36 Units Subcutaneous HS Low Davis, DO     • insulin lispro  1-6 Units Subcutaneous TID AC Sally Jones PA-C     • insulin lispro  5 Units Subcutaneous TID With Meals Sally Appiah PA-C     • ipratropium  0 5 mg Nebulization Q6H Sally Jones PA-C     • isosorbide mononitrate  30 mg Oral Daily Sally Appiah PA-C     • levalbuterol  1 25 mg Nebulization Q6H Sally Jones PA-C     • ondansetron  4 mg Intravenous Q6H PRN Pastormetine RICK Hutson     • pantoprazole  40 mg Oral BID AC Sally Jones PA-C     • pravastatin  80 mg Oral Daily With National Enriqueta Dominguez PA-C     • QUEtiapine  12 5 mg Oral HS Pastormetine RICK Hutson     • senna-docusate sodium  1 tablet Oral HS Pastormetine RICK Hutson     • tamsulosin  0 4 mg Oral Daily With National Enriqueta Dominguez PA-C     • torsemide  60 mg Oral Daily Tameka Yeh MD          Today, Patient Was Seen By: Low Davis DO    **Please Note: This note may have been constructed using a voice recognition system  **

## 2022-12-01 NOTE — PLAN OF CARE
Problem: OCCUPATIONAL THERAPY ADULT  Goal: Performs self-care activities at highest level of function for planned discharge setting  See evaluation for individualized goals  Description: Treatment Interventions: ADL retraining, Functional transfer training, Endurance training, Cognitive reorientation, Patient/family training, Equipment evaluation/education, Compensatory technique education, Energy conservation, Activityengagement  Equipment Recommended: Bedside commode, Shower/Tub chair with back ($)       See flowsheet documentation for full assessment, interventions and recommendations  Outcome: Progressing  Note: Limitation: Decreased ADL status, Decreased endurance, Decreased self-care trans, Decreased high-level ADLs, Decreased Safe judgement during ADL  Prognosis: Fair  Assessment: Pt seen on this date for OT session focusing on ADL retraining, cognitive reorientation, body mechanics, transfer retraining, increasing activity tolerance/endurance to increase ability to participate in ADL/functional tasks  Pt was found in chair and was left in chair w/ all needs within reach, chair alarm on  Pt completed ADL tasks while seated in chair- grooming: S, UB ADL: min A, LB bathing: mod A, LB dressing: max A  Pt tolerated well, did require vc for task initiation  Requires inc time to complete tasks and follow one step commands  Pt w/ improvements in activity tolerance, ADL task completion, cognition, , however is still limited 2* decreased ADL/High-level ADL status, decreased activity tolerance/endurance, decreased cognition, decreased self-care trans, decreased safety awareness and insight to condition  The patient's raw score on the AM-PAC Daily Activity inpatient short form is 16, standardized score is 35 96, less than 39 4  Patients at this level are likely to benefit from discharge to post-acute rehabilitation services   Please refer to the recommendation of the Occupational Therapist for safe discharge planning  Recommending pt D/C to STR when medically stable  Pt will continue to benefit from acute OT services to meet goals       OT Discharge Recommendation: Post acute rehabilitation services

## 2022-12-01 NOTE — PROGRESS NOTES
NEPHROLOGY PROGRESS NOTE   Mercedes Vaughn  78 y o  male MRN: [id number]  Unit/Bed#: [facility] 531-01 Encounter: [id number]  Reason for Consult: MARANDA CKD    ASSESSMENT AND PLAN:  MARANDA on CKD stage 4, baseline creatinine 2 5 to 2 7  -peak creatinine 3 2 overall improved and seems to have plateaued and slightly better 2 4 today  -MARANDA suspect secondary to component of cardiorenal, bacteremia, suspected UTI, contributing to ATN  -may have to accept higher creatinine to keep patient euvolemic   -diuretics as below  -recent UA showed innumerable WBCs, 20 to 30 RBCs, 1+ proteinuria (in the setting of suspected UTI), will need eventual repeat UA with microscopy  -BMP in a m   -renal ultrasound shows no hydro, normal size kidneys, normal echogenicity       Acute on chronic CHF  -echo in May 2022 shows EF 93%, normal diastolic function  -currently remains on 2-3L O2 via nasal cannula which is his chronic requirement   Overall volume status seems to be stable  Weight overall stable and fluctuating   -outpatient regimen includes Lasix 40 mg daily, this was changed to torsemide 60 mg daily    -continue intake and output monitoring, daily weight     Anemia in CKD, continue to monitor hemoglobin, transfuse p r n  For hemoglobin less than seven  -recent FOBT positive, component of GI bleed     Pseudomonas bacteremia, suspected UTI,?  Pneumonia, status post completed antibiotic course  Now remains off antibiotics       Acute on chronic hypoxic/hypercapnic respiratory failure, BiPAP at nighttime  -currently on 2 L O2 via nasal cannula   Management as per primary team  -overall slowly improving     Encephalopathy, slowly improving continue to monitor     Discussed above plan in detail with primary team    SUBJECTIVE:  Patient seen and examined at bedside  Denies chest pain, any worsening shortness of breath, nausea vomiting      OBJECTIVE:  Current Weight: Weight - Scale: 103 kg (226 lb 14 4 oz)  Vitals:    12/01/22 1332   BP: Pulse:    Resp:    Temp:    SpO2: 100%       Intake/Output Summary (Last 24 hours) at 12/1/2022 1407  Last data filed at 12/1/2022 1219  Gross per 24 hour   Intake 780 ml   Output 2000 ml   Net -1220 ml     Wt Readings from Last 3 Encounters:   12/01/22 103 kg (226 lb 14 4 oz)   11/09/22 103 kg (227 lb 3 2 oz)   11/01/22 97 4 kg (214 lb 11 7 oz)     Temp Readings from Last 3 Encounters:   12/01/22 (!) 97 1 °F (36 2 °C) (Axillary)   11/09/22 98 2 °F (36 8 °C) (Oral)   11/02/22 98 5 °F (36 9 °C)     BP Readings from Last 3 Encounters:   12/01/22 112/55   11/09/22 (!) 118/46   11/02/22 140/58     Pulse Readings from Last 3 Encounters:   12/01/22 66   11/09/22 65   11/02/22 94        Physical Examination:  General:  Sitting in chair, no acute distress   Eyes:  Mild conjunctival pallor present  ENT:  External examination of ears and nose unremarkable  Neck:  No obvious lymphadenopathy appreciated  Respiratory:  Bilateral air entry present  CVS:  S1, S2 present  GI:  Soft nondistended  CNS:  Active alert oriented, follows commands  Skin:  No new rash  Musculoskeletal:  No obvious new gross deformity noted    Medications:    Current Facility-Administered Medications:   •  acetaminophen (TYLENOL) tablet 650 mg, 650 mg, Oral, Q6H PRN, Amparo Montana PA-C  •  apixaban (ELIQUIS) tablet 5 mg, 5 mg, Oral, BID, Sally Jones PA-C, 5 mg at 12/01/22 0845  •  aspirin (ECOTRIN LOW STRENGTH) EC tablet 81 mg, 81 mg, Oral, Daily, RACHAEL Rogers-C, 81 mg at 12/01/22 0845  •  budesonide (PULMICORT) inhalation solution 0 5 mg, 0 5 mg, Nebulization, Q12H, RACHAEL Bah-C, 0 5 mg at 12/01/22 0820  •  carvedilol (COREG) tablet 6 25 mg, 6 25 mg, Oral, BID With Meals, RACHAEL Seth-C, 6 25 mg at 12/01/22 0845  •  fluticasone (FLOVENT HFA) 110 MCG/ACT inhaler 1 puff, 1 puff, Inhalation, Q12H [facility], Sally Jones PA-C, 1 puff at 12/01/22 0849  •  formoterol (PERFOROMIST) nebulizer solution 20 mcg, 20 mcg, Nebulization, Q12H, Sally Huang PA-C, 20 mcg at 11/30/22 0677  •  gabapentin (NEURONTIN) capsule 100 mg, 100 mg, Oral, TID, Sally Jones PA-C, 100 mg at 12/01/22 0845  •  insulin glargine (LANTUS) subcutaneous injection 36 Units 0 36 mL, 36 Units, Subcutaneous, HS, Twyla Oleary DO, 36 Units at 11/30/22 2200  •  insulin lispro (HumaLOG) 100 units/mL subcutaneous injection 1-6 Units, 1-6 Units, Subcutaneous, TID AC, 1 Units at 12/01/22 0846 **AND** Fingerstick Glucose (POCT), , , TID AC, Sally Jones PA-C  •  insulin lispro (HumaLOG) 100 units/mL subcutaneous injection 5 Units, 5 Units, Subcutaneous, TID With Meals, Kacey Bermudez PA-C, 5 Units at 12/01/22 0849  •  ipratropium (ATROVENT) 0 02 % inhalation solution 0 5 mg, 0 5 mg, Nebulization, Q6H, Sally Jones PA-C, 0 5 mg at 12/01/22 1325  •  isosorbide mononitrate (IMDUR) 24 hr tablet 30 mg, 30 mg, Oral, Daily, Sally Jones PA-C, 30 mg at 12/01/22 0845  •  levalbuterol (XOPENEX) inhalation solution 1 25 mg, 1 25 mg, Nebulization, Q6H, Sally Jones PA-C, 1 25 mg at 12/01/22 1325  •  ondansetron (ZOFRAN) injection 4 mg, 4 mg, Intravenous, Q6H PRN, Kacey Bermudez PA-C  •  pantoprazole (PROTONIX) EC tablet 40 mg, 40 mg, Oral, BID AC, Sally Jones PA-C, 40 mg at 12/01/22 0845  •  pravastatin (PRAVACHOL) tablet 80 mg, 80 mg, Oral, Daily With Dinner, Kacey Bermudez PA-C, 80 mg at 11/30/22 1714  •  QUEtiapine (SEROquel) tablet 12 5 mg, 12 5 mg, Oral, HS, Sally RACHAEL Andino-C, 12 5 mg at 11/30/22 2200  •  senna-docusate sodium (SENOKOT S) 8 6-50 mg per tablet 1 tablet, 1 tablet, Oral, HS, Sally RACHAEL Andino-C, 1 tablet at 11/29/22 2133  •  tamsulosin (FLOMAX) capsule 0 4 mg, 0 4 mg, Oral, Daily With Dinner, Kacey Bermudez PA-C, 0 4 mg at 11/30/22 1714  •  torsemide (DEMADEX) tablet 60 mg, 60 mg, Oral, Daily, Heraclio Sandoval MD, 60 mg at 12/01/22 0845    Laboratory Results:  Results from last 7 days   Lab Units 12/01/22  2715 11/30/22  0270 11/29/22  0606 11/28/22  0455 11/27/22  0834 11/27/22  0518 11/26/22  0504 11/25/22  0809 11/25/22  0449 11/24/22  2034 11/24/22  2034   WBC Thousand/uL 9 02 8 51 7 35 7 43  --  6 81 6 06 6 55 6 28   < >  --    HEMOGLOBIN g/dL 7 1* 7 6* 7 4* 7 6*  --  7 8* 7 4* 6 7* 7 1*   < >  --    HEMATOCRIT % 24 2* 25 7* 26 0* 26 1*  --  26 2* 26 0* 21 6* 23 7*   < >  --    PLATELETS Thousands/uL 174 168 153 154  --  141* 136* 135* 135*   < >  --    SODIUM mmol/L 138 139 139 139  --  135 142  --  140  --  140   POTASSIUM mmol/L 4 4 4 5 4 6 4 8 4 5 5 7* 4 3  --  4 6  --  4 8   CHLORIDE mmol/L 96 98 98 99  --  99 104  --  105  --  103   CO2 mmol/L 39* 38* 37* 37*  --  31 32  --  35*  --  34*   BUN mg/dL 47* 47* 51* 52*  --  54* 58*  --  59*  --  58*   CREATININE mg/dL 2 43* 2 61* 2 61* 2 61*  --  2 41* 2 58*  --  2 84*  --  3 04*   CALCIUM mg/dL 8 6 8 6 8 4 8 2*  --  8 4 8 3  --  8 5  --  8 7   MAGNESIUM mg/dL  --   --  2 4 2 2  --  2 5 2 5  --  2 5  --  2 6   PHOSPHORUS mg/dL  --   --  4 1 3 9  --  3 7 3 5  --  3 9  --  4 4*    < > = values in this interval not displayed  CT chest wo contrast   Final Result by Tanisha Reveles MD (12/01 6126)      Unchanged small right pleural effusion  Slightly increased small left pleural effusion  Chronic right lung volume loss and right basilar rounded atelectasis  Workstation performed: [id number]         XR chest portable   Final Result by Shahnaz Alarcon MD (11/30 1035)      Stable volume loss and opacification of the right hemithorax possibly scarring and effusion  An element of atelectasis and/or pneumonia difficult to exclude  Workstation performed: [id number]         XR chest portable ICU   Final Result by Minerva Rodríguez MD (11/28 1137)      Persistent right effusion and right base atelectasis  Pneumonia not excluded in the appropriate setting                    Workstation performed: [id number]         XR chest portable ICU   Final Result by Rosy Reaves MD (11/25 1081)      Unchanged moderate right pleural effusion, and right hemithorax volume loss with right basilar atelectasis  Workstation performed: Federico Lozano kidney and bladder   Final Result by Nguyen Moore MD (11/25 3089)      No evidence of obstruction  Ascites  Cholelithiasis  Workstation performed: [id number]         CT head wo contrast   Final Result by Jules Gracia MD (11/24 1814)      No acute intracranial abnormality  Chronic microangiopathic changes  Workstation performed: [id number]         XR chest portable   Final Result by Walter Selby MD (11/24 1515)         1  New rounded cavity in the left base raises concern for developing pneumonia  Otherwise, negative findings in the chest       The study was marked in [facility] for immediate notification  Workstation performed: [id number]         IR IN-Patient Thoracentesis   Final Result by Dawna Machado MD (11/23 1918)   Impression:   1  Successful ultrasound-guided thoracentesis yielding 900 mL of serosanguineous pleural fluid  Workstation performed: [id number]         XR chest portable ICU   Final Result by Nguyen Moore MD (11/21 1014)      Moderate right pleural effusion with associated right lung base opacity  Workstation performed: [id number]         CT chest wo contrast   Final Result by Tony Reeves MD (11/20 1758)      1  Increase in moderate right pleural effusion with new small left effusion  2   New near complete occlusion of middle lobe and right lower lobe bronchial branches with atelectasis  3  New small perihepatic ascites  The study was marked in [facility] for immediate notification           Workstation performed: [id number]         XR chest 1 view portable   Final Result by Lynn Mcmahon MD (11/20 2056)      Increased size right pleural effusion    Workstation performed: [id number]             Portions of the record may have been created with voice recognition software  Occasional wrong word or "sound a like" substitutions may have occurred due to the inherent limitations of voice recognition software  Read the chart carefully and recognize, using context, where substitutions have occurred

## 2022-12-01 NOTE — ASSESSMENT & PLAN NOTE
Wt Readings from Last 3 Encounters:   12/01/22 103 kg (226 lb 14 4 oz)   11/09/22 103 kg (227 lb 3 2 oz)   11/01/22 97 4 kg (214 lb 11 7 oz)       Patient with history of chronic diastolic heart failure  Presenting with shortness of breath worsening leg swelling for the past day  Patient examined volume overloaded, crackles on exam, lower extremity edema  IV Bumex -> Torsemide  Home dose was 40 mg Lasix daily  Fluid restriction, sodium restriction  Monitor urine output, check daily weight

## 2022-12-01 NOTE — ASSESSMENT & PLAN NOTE
Lab Results   Component Value Date    EGFR 25 12/01/2022    EGFR 22 11/30/2022    EGFR 22 11/29/2022    CREATININE 2 43 (H) 12/01/2022    CREATININE 2 61 (H) 11/30/2022    CREATININE 2 61 (H) 11/29/2022   Estimated Creatinine Clearance: 32 6 mL/min (A) (by C-G formula based on SCr of 2 43 mg/dL (H))    Creatinine baseline seems to be around 1 8-2  MARANDA evolving on admission  Peaked at 3 2 and now trending down  Renal appreciated

## 2022-12-01 NOTE — ASSESSMENT & PLAN NOTE
• Patient with documented chronic recurrent pleural effusion on the right   • S/p Thoracentesis on 11/08/22  ? Cytology: Minute cluster of atypical cells noted in cellblock material only  ? Atypical cluster is favored to represent contamination during specimen processing; however, given the patient's history of lung adenocarcinoma, involvement by malignancy cannot be definitively ruled out  · Previous cytology from fluid negative for malignancy  · Pt still has effusion but asymptomatic    Can have OP thoras prn SOB

## 2022-12-01 NOTE — ASSESSMENT & PLAN NOTE
· FOBT pos 10/19  · Hgb stable overall  · OP f/u w/ GI  · Will give Venofer today  · If Hgb below 7, will need 1U PRBC

## 2022-12-01 NOTE — PLAN OF CARE
Problem: PHYSICAL THERAPY ADULT  Goal: Performs mobility at highest level of function for planned discharge setting  See evaluation for individualized goals  Description: Treatment/Interventions: ADL retraining, Functional transfer training, LE strengthening/ROM, Elevations, Therapeutic exercise, Endurance training, Cognitive reorientation, Patient/family training, Equipment eval/education, Bed mobility, Gait training, Continued evaluation, Compensatory technique education, Spoke to nursing, Spoke to case management, Spoke to advanced practitioner, OT  Equipment Recommended: Jasbir Welsh       See flowsheet documentation for full assessment, interventions and recommendations  12/1/2022 1052 by Ministerio Arias PT  Outcome: Progressing  Note: Prognosis: Good  Problem List: Decreased strength, Decreased endurance, Impaired balance, Decreased mobility, Decreased range of motion, Impaired judgement, Decreased safety awareness  Assessment: Pt seen for PT treatment session with focus on bed mobility, functional transfers, functional mobility  Pt making steady progress toward goals this session  Pt continues to require assist for STS 2/2 LE strength deficits  Pt able to ambulate increased distance this session compared to last, however, remains very unsteady and at an increased risk for falls  Pt also fatigues quickly, requires frequent sitting rest breaks while ambulatin  Pt left upright in bedside chair with chair alarm intact and with all needs in reach  Pt will benefit from skilled therapy in order to address current impairments and functional limitations  PT to follow pt and recommending rehab once medically cleared  The patient's AM-PAC Basic Mobility Inpatient Short Form Raw Score is 16  A Raw score of less than or equal to 16 suggests the patient may benefit from discharge to post-acute rehabilitation services   Please also refer to the recommendation of the Physical Therapist for safe discharge planning  Barriers to Discharge: Inaccessible home environment, Decreased caregiver support     PT Discharge Recommendation: Post acute rehabilitation services    See flowsheet documentation for full assessment

## 2022-12-01 NOTE — PROGRESS NOTES
Progress Note - Infectious Disease   Santana Mckenna Sr  78 y o  male MRN: [id number]  Unit/Bed#: [facility] 531-01 Encounter: [id number]      Impression/Plan:  1  Sepsis-present on admission   Leukocytosis and tachypnea   Appears to be secondary to Pseudomonas bacteremia from urinary tract infection  No other definitive source appreciated   The patient has remained hemodynamically stable off all antibiotics status post a 10 day treatment course  -continue antibiotics as below  -recheck CBC with diff and BMP  -supportive care     2  Pseudomonas bacteremia-appears to be secondary to urinary tract infection in the setting of urinary retention   No other clear source appreciated   Could represent bacteremia from a pneumonia but the patient's pulmonary status seems to be around baseline and the CT scan has some chronic findings of the right base   Repeat blood cultures negative  Patient completed a 10 day course of intravenous anti pseudomonal treatment  -no additional antibiotics indicated  -monitor for recurrence     3  Pseudomonas UTI-in the setting of urinary retention   The patient now has a Cook catheter in place   Ultrasound without hydronephrosis or abscess  Patient completed a 10 day course of intravenous anti pseudomonal treatment  -Cook drainage  -monitor symptomatology       4  Acute kidney injury-complicating chronic kidney disease   Renal function worsening   Suspected multifactorial including cardiorenal syndrome, sepsis, and possible ATN   Pre renal issues may be playing a role  Renal function has improved and now stabilized  -recheck BMP  -volume management  -close nephrology follow-up     5  Acute hypoxic respiratory failure-with increased pleural effusion on the setting of known adenocarcinoma of the lung   Volume loss the right base seems somewhat chronic   His respiratory status has deteriorated and he is now back on BiPAP   He underwent thoracentesis on 11/23/2022    Repeat CT of the chest 12/1/2022 with small effusions, volume loss, atelectasis particularly on the right but no enlarging effusion or area of consolidation  Pleural cultures negative  Most recent pleural cytology negative  -oxygen support  -monitor respiratory status     6  Acute encephalopathy-suspect multifactorial including metabolic issues, sepsis   Waxing waning but now with decreased cognition in the setting of CO2 retention   Improvement of the CO2 retention but still with some cognitive abnormalities   Improved  -treatment of respiratory failure  -monitor cognition  -discontinue antibiotics after last dose tomorrow     7  Diabetes mellitus-type 2 with hyperglycemia with long-term insulin use     Discussed the above management plan with the primary service    Antibiotics:  Status post 10 day course of anti pseudomonal treatment   Day 2 off antibiotics    Subjective:  Patient has no fever, chills, sweats; no nausea, vomiting, diarrhea; no cough, shortness of breath; no pain  No new symptoms  He remains on BiPAP overnight    Objective:  Vitals:  Temp:  [97 1 °F (36 2 °C)-99 3 °F (37 4 °C)] 97 1 °F (36 2 °C)  HR:  [66-76] 66  Resp:  [18] 18  BP: (112-118)/(54-56) 112/55  SpO2:  [92 %-100 %] 98 %  Temp (24hrs), Av 2 °F (36 8 °C), Min:97 1 °F (36 2 °C), Max:99 3 °F (37 4 °C)  Current: Temperature: (!) 97 1 °F (36 2 °C)    Physical Exam:   General Appearance:  Alert, interactive, nontoxic, no acute distress  Throat: Oropharynx moist without lesions  Lungs:   Decreased breath sounds bilaterally; no wheezes, rhonchi or rales; respirations unlabored   Heart:  RRR; no murmur, rub or gallop   Abdomen:   Soft, non-tender, non-distended, positive bowel sounds  Extremities: No clubbing, cyanosis or edema   Skin: No new rashes or lesions  No draining wounds noted         Labs, Imaging, & Other studies:   All pertinent labs and imaging studies were personally reviewed  Results from last 7 days   Lab Units 22  9372 22  1603 11/29/22  0606   WBC Thousand/uL 9 02 8 51 7 35   HEMOGLOBIN g/dL 7 1* 7 6* 7 4*   PLATELETS Thousands/uL 174 168 153     Results from last 7 days   Lab Units 12/01/22  0437 11/30/22  0628 11/29/22  0606 11/28/22  0455 11/27/22  0834 11/27/22  0518   SODIUM mmol/L 138 139 139 139  --  135   POTASSIUM mmol/L 4 4 4 5 4 6 4 8   < > 5 7*   CHLORIDE mmol/L 96 98 98 99  --  99   CO2 mmol/L 39* 38* 37* 37*  --  31   BUN mg/dL 47* 47* 51* 52*  --  54*   CREATININE mg/dL 2 43* 2 61* 2 61* 2 61*  --  2 41*   EGFR ml/min/1 73sq m 25 22 22 22  --  25   CALCIUM mg/dL 8 6 8 6 8 4 8 2*  --  8 4   AST U/L  --   --  6 7  --  48*   ALT U/L  --   --  9* 9*  --  11*   ALK PHOS U/L  --   --  59 59  --  67    < > = values in this interval not displayed           Results from last 7 days   Lab Units 11/25/22  0449   PROCALCITONIN ng/ml 0 64*        CT chest-small right pleural effusion, slightly increased small left pleural effusion, chronic right lung volume loss and atelectasis    Images personally reviewed by me in PACS

## 2022-12-02 ENCOUNTER — TELEPHONE (OUTPATIENT)
Dept: NEPHROLOGY | Facility: CLINIC | Age: 77
End: 2022-12-02

## 2022-12-02 VITALS
WEIGHT: 223 LBS | TEMPERATURE: 98 F | OXYGEN SATURATION: 100 % | RESPIRATION RATE: 18 BRPM | HEIGHT: 72 IN | SYSTOLIC BLOOD PRESSURE: 113 MMHG | HEART RATE: 63 BPM | BODY MASS INDEX: 30.2 KG/M2 | DIASTOLIC BLOOD PRESSURE: 64 MMHG

## 2022-12-02 PROBLEM — R78.81 BACTEREMIA DUE TO PSEUDOMONAS: Status: RESOLVED | Noted: 2022-11-29 | Resolved: 2022-12-02

## 2022-12-02 PROBLEM — J18.9 PNEUMONIA: Status: RESOLVED | Noted: 2022-11-20 | Resolved: 2022-12-02

## 2022-12-02 PROBLEM — A41.9 SEPSIS (HCC): Status: RESOLVED | Noted: 2022-11-20 | Resolved: 2022-12-02

## 2022-12-02 PROBLEM — B96.5 BACTEREMIA DUE TO PSEUDOMONAS: Status: RESOLVED | Noted: 2022-11-29 | Resolved: 2022-12-02

## 2022-12-02 LAB
ANION GAP SERPL CALCULATED.3IONS-SCNC: 2 MMOL/L (ref 4–13)
BUN SERPL-MCNC: 45 MG/DL (ref 5–25)
CALCIUM SERPL-MCNC: 8.5 MG/DL (ref 8.3–10.1)
CHLORIDE SERPL-SCNC: 95 MMOL/L (ref 96–108)
CO2 SERPL-SCNC: 39 MMOL/L (ref 21–32)
CREAT SERPL-MCNC: 2.62 MG/DL (ref 0.6–1.3)
ERYTHROCYTE [DISTWIDTH] IN BLOOD BY AUTOMATED COUNT: 15 % (ref 11.6–15.1)
FLUAV RNA RESP QL NAA+PROBE: NEGATIVE
FLUBV RNA RESP QL NAA+PROBE: NEGATIVE
GFR SERPL CREATININE-BSD FRML MDRD: 22 ML/MIN/1.73SQ M
GLUCOSE SERPL-MCNC: 137 MG/DL (ref 65–140)
GLUCOSE SERPL-MCNC: 143 MG/DL (ref 65–140)
GLUCOSE SERPL-MCNC: 161 MG/DL (ref 65–140)
HCT VFR BLD AUTO: 25.8 % (ref 36.5–49.3)
HGB BLD-MCNC: 7.5 G/DL (ref 12–17)
MCH RBC QN AUTO: 24.7 PG (ref 26.8–34.3)
MCHC RBC AUTO-ENTMCNC: 29.1 G/DL (ref 31.4–37.4)
MCV RBC AUTO: 85 FL (ref 82–98)
PLATELET # BLD AUTO: 172 THOUSANDS/UL (ref 149–390)
PMV BLD AUTO: 10.5 FL (ref 8.9–12.7)
POTASSIUM SERPL-SCNC: 4 MMOL/L (ref 3.5–5.3)
RBC # BLD AUTO: 3.04 MILLION/UL (ref 3.88–5.62)
RSV RNA RESP QL NAA+PROBE: NEGATIVE
SARS-COV-2 RNA RESP QL NAA+PROBE: NEGATIVE
SODIUM SERPL-SCNC: 136 MMOL/L (ref 135–147)
WBC # BLD AUTO: 7.53 THOUSAND/UL (ref 4.31–10.16)

## 2022-12-02 RX ORDER — LEVALBUTEROL 1.25 MG/.5ML
1.25 SOLUTION, CONCENTRATE RESPIRATORY (INHALATION)
Refills: 0
Start: 2022-12-02

## 2022-12-02 RX ORDER — FLUTICASONE PROPIONATE 110 UG/1
1 AEROSOL, METERED RESPIRATORY (INHALATION) EVERY 12 HOURS SCHEDULED
Qty: 12 G | Refills: 0
Start: 2022-12-02

## 2022-12-02 RX ORDER — INSULIN LISPRO 100 [IU]/ML
5 INJECTION, SOLUTION INTRAVENOUS; SUBCUTANEOUS
Qty: 15 ML | Refills: 0 | Status: SHIPPED | OUTPATIENT
Start: 2022-12-02

## 2022-12-02 RX ORDER — QUETIAPINE FUMARATE 25 MG/1
12.5 TABLET, FILM COATED ORAL
Refills: 0
Start: 2022-12-02

## 2022-12-02 RX ORDER — INSULIN LISPRO 100 [IU]/ML
1-6 INJECTION, SOLUTION INTRAVENOUS; SUBCUTANEOUS
Refills: 0
Start: 2022-12-02

## 2022-12-02 RX ORDER — AMOXICILLIN 250 MG
1 CAPSULE ORAL
Refills: 0
Start: 2022-12-02

## 2022-12-02 RX ORDER — INSULIN GLARGINE 100 [IU]/ML
36 INJECTION, SOLUTION SUBCUTANEOUS
Qty: 12 ML | Refills: 0
Start: 2022-12-02 | End: 2023-01-01

## 2022-12-02 RX ORDER — FORMOTEROL FUMARATE 20 UG/2ML
20 SOLUTION RESPIRATORY (INHALATION)
Refills: 0
Start: 2022-12-02

## 2022-12-02 RX ORDER — BUDESONIDE 0.5 MG/2ML
0.5 INHALANT ORAL
Refills: 0
Start: 2022-12-02

## 2022-12-02 RX ADMIN — APIXABAN 5 MG: 5 TABLET, FILM COATED ORAL at 08:11

## 2022-12-02 RX ADMIN — ASPIRIN 81 MG: 81 TABLET, COATED ORAL at 08:11

## 2022-12-02 RX ADMIN — CARVEDILOL 6.25 MG: 6.25 TABLET, FILM COATED ORAL at 06:54

## 2022-12-02 RX ADMIN — TORSEMIDE 60 MG: 20 TABLET ORAL at 08:11

## 2022-12-02 RX ADMIN — IPRATROPIUM BROMIDE 0.5 MG: 0.5 SOLUTION RESPIRATORY (INHALATION) at 01:17

## 2022-12-02 RX ADMIN — LEVALBUTEROL HYDROCHLORIDE 1.25 MG: 1.25 SOLUTION, CONCENTRATE RESPIRATORY (INHALATION) at 01:17

## 2022-12-02 RX ADMIN — ISOSORBIDE MONONITRATE 30 MG: 30 TABLET, EXTENDED RELEASE ORAL at 08:11

## 2022-12-02 RX ADMIN — BUDESONIDE 0.5 MG: 0.5 INHALANT ORAL at 08:13

## 2022-12-02 RX ADMIN — FORMOTEROL FUMARATE DIHYDRATE 20 MCG: 20 SOLUTION RESPIRATORY (INHALATION) at 08:13

## 2022-12-02 RX ADMIN — INSULIN LISPRO 5 UNITS: 100 INJECTION, SOLUTION INTRAVENOUS; SUBCUTANEOUS at 12:32

## 2022-12-02 RX ADMIN — FLUTICASONE PROPIONATE 1 PUFF: 110 AEROSOL, METERED RESPIRATORY (INHALATION) at 08:15

## 2022-12-02 RX ADMIN — INSULIN LISPRO 5 UNITS: 100 INJECTION, SOLUTION INTRAVENOUS; SUBCUTANEOUS at 08:13

## 2022-12-02 RX ADMIN — GABAPENTIN 100 MG: 100 CAPSULE ORAL at 08:11

## 2022-12-02 RX ADMIN — INSULIN LISPRO 1 UNITS: 100 INJECTION, SOLUTION INTRAVENOUS; SUBCUTANEOUS at 12:31

## 2022-12-02 RX ADMIN — LEVALBUTEROL HYDROCHLORIDE 1.25 MG: 1.25 SOLUTION, CONCENTRATE RESPIRATORY (INHALATION) at 08:13

## 2022-12-02 RX ADMIN — PANTOPRAZOLE SODIUM 40 MG: 40 TABLET, DELAYED RELEASE ORAL at 06:54

## 2022-12-02 RX ADMIN — IPRATROPIUM BROMIDE 0.5 MG: 0.5 SOLUTION RESPIRATORY (INHALATION) at 08:13

## 2022-12-02 NOTE — ASSESSMENT & PLAN NOTE
Lab Results   Component Value Date    EGFR 22 12/02/2022    EGFR 25 12/01/2022    EGFR 22 11/30/2022    CREATININE 2 62 (H) 12/02/2022    CREATININE 2 43 (H) 12/01/2022    CREATININE 2 61 (H) 11/30/2022   Estimated Creatinine Clearance: 30 mL/min (A) (by C-G formula based on SCr of 2 62 mg/dL (H))    Creatinine baseline seems to be around 1 8-2  MARANDA evolving on admission  Peaked at 3 2 and now trending down  Renal appreciated  Likely new b/l 2 6

## 2022-12-02 NOTE — ASSESSMENT & PLAN NOTE
Wt Readings from Last 3 Encounters:   12/02/22 101 kg (223 lb)   11/09/22 103 kg (227 lb 3 2 oz)   11/01/22 97 4 kg (214 lb 11 7 oz)       Patient with history of chronic diastolic heart failure  Presenting with shortness of breath worsening leg swelling for the past day  Patient examined volume overloaded, crackles on exam, lower extremity edema  IV Bumex -> Torsemide  Home dose was 40 mg Lasix daily  Fluid restriction, sodium restriction  Monitor urine output, check daily weight

## 2022-12-02 NOTE — ASSESSMENT & PLAN NOTE
Lab Results   Component Value Date    HGBA1C 9 7 (H) 10/10/2022       Recent Labs     12/01/22  1606 12/01/22  2035 12/02/22  0604 12/02/22  1121   POCGLU 139 248* 137 161*       Blood Sugar Average: Last 72 hrs:  (P) 199 [id number]     Home regimen included 30 units at bedtime, 10 units with meals  Will c/w Lantus 36U at bedtime, c/w 5 units Humalog with meals, still continue sliding scale  DM diet

## 2022-12-02 NOTE — CASE MANAGEMENT
Paloma Staples has received approved authorization from:   Paula received for: SNF  Facility: [facility] #: [id number]  Start of Care: 12/1  Next Review Date:12/5  Care Coordinator: Tammie ZHANG#: [phone]  Submit next review to: [phone]   Care Manager notified: Isabel Garcia

## 2022-12-02 NOTE — ASSESSMENT & PLAN NOTE
Patient with history of COPD on 2 L nasal cannula baseline  Do not believe patient has a COPD exacerbation at this time  Currently on baseline 2L  D/c on nebs as they appear to help pt

## 2022-12-02 NOTE — ASSESSMENT & PLAN NOTE
Acute on chronic respiratory failure  Patient admitted for acute heart failure exacerbation initial requiring 50 L high-flow now weaned down to 6 L nasal cannula  Now down to baseline 2L  Likely CHFand PNA  11/30 pt appears slightly tachypnic  VBG chronic resp acidosis  CXR right lung still w/ opacity  CT chest showed unchanged effusion  Today pt comfortable    Pt will need thoras prn comfort

## 2022-12-02 NOTE — PROGRESS NOTES
NEPHROLOGY PROGRESS NOTE   Niles oLbo  78 y o  male MRN: [id number]  Unit/Bed#: [facility] 531-01 Encounter: [id number]  Reason for Consult: MARANDA CKD    ASSESSMENT AND PLAN:  MARANDA on CKD stage 4, baseline creatinine 2 5 to 2 7 since October 2022, prior mid 1s  -peak creatinine 3 2 overall improved and seems to have plateaued, creatinine 2 6 today  -MARANDA suspect secondary to component of cardiorenal, bacteremia, suspected UTI, contributing to ATN  -may have to accept higher creatinine to keep patient euvolemic   -diuretics as below  -recent UA showed innumerable WBCs, 20 to 30 RBCs, 1+ proteinuria (in the setting of suspected UTI), will need eventual repeat UA with microscopy  -BMP in a m   -renal ultrasound shows no hydro, normal size kidneys, normal echogenicity  -CKD suspect secondary to long-term uncontrolled diabetes, hypertension      Acute on chronic CHF  -echo in May 2022 shows EF 88%, normal diastolic function  -currently remains on 2-3L O2 via nasal cannula which is his chronic requirement   Overall volume status seems to be stable  Weight has reduced today   -outpatient regimen includes Lasix 40 mg daily, this was changed to torsemide 60 mg daily    -continue intake and output monitoring, daily weight     Anemia in CKD, continue to monitor hemoglobin, transfuse p r n  For hemoglobin less than seven  -recent FOBT positive, component of GI bleed     Pseudomonas bacteremia, suspected UTI,?  Pneumonia, status post completed antibiotic course  Now remains off antibiotics       Acute on chronic hypoxic/hypercapnic respiratory failure, BiPAP at nighttime  -currently on 3 L O2 via nasal cannula   Management as per primary team  -overall slowly improving and stable     Encephalopathy, slowly improving continue to monitor     Discussed above plan in detail with primary team    SUBJECTIVE:  Patient seen and examined at bedside  Denies chest pain, any worsening shortness of breath    Denies any nausea vomiting    OBJECTIVE:  Current Weight: Weight - Scale: 101 kg (223 lb)  Vitals:    12/02/22 0813   BP:    Pulse:    Resp:    Temp:    SpO2: 100%       Intake/Output Summary (Last 24 hours) at 12/2/2022 1315  Last data filed at 12/2/2022 1209  Gross per 24 hour   Intake 1140 ml   Output 2131 ml   Net -991 ml     Wt Readings from Last 3 Encounters:   12/02/22 101 kg (223 lb)   11/09/22 103 kg (227 lb 3 2 oz)   11/01/22 97 4 kg (214 lb 11 7 oz)     Temp Readings from Last 3 Encounters:   12/02/22 98 °F (36 7 °C)   11/09/22 98 2 °F (36 8 °C) (Oral)   11/02/22 98 5 °F (36 9 °C)     BP Readings from Last 3 Encounters:   12/02/22 113/64   11/09/22 (!) 118/46   11/02/22 140/58     Pulse Readings from Last 3 Encounters:   12/02/22 63   11/09/22 65   11/02/22 94        Physical Examination:  General:  Sitting in chair, no acute distress   Eyes:  Mild conjunctival pallor present  ENT:  External examination of ears and nose unremarkable  Neck:  No obvious lymphadenopathy appreciated  Respiratory:  Bilateral air entry present  CVS:  S1, S2 present  GI:  Soft, nondistended  CNS:  Active alert oriented x3  Skin:  No new rash  Musculoskeletal:  No obvious new gross deformity noted    Medications:    Current Facility-Administered Medications:   •  acetaminophen (TYLENOL) tablet 650 mg, 650 mg, Oral, Q6H PRN, Clemetine RACHAEL Hutson-TANIA, 650 mg at 12/01/22 1644  •  apixaban (ELIQUIS) tablet 5 mg, 5 mg, Oral, BID, RACHAEL Bah-TANIA, 5 mg at 12/02/22 7406  •  aspirin (ECOTRIN LOW STRENGTH) EC tablet 81 mg, 81 mg, Oral, Daily, RACHAEL Frazier-TANIA, 81 mg at 12/02/22 3706  •  budesonide (PULMICORT) inhalation solution 0 5 mg, 0 5 mg, Nebulization, Q12H, Sally Jones PA-C, 0 5 mg at 12/02/22 9065  •  carvedilol (COREG) tablet 6 25 mg, 6 25 mg, Oral, BID With Meals, Clemetine RICK Hutson, 6 25 mg at 12/02/22 0654  •  fluticasone (FLOVENT HFA) 110 MCG/ACT inhaler 1 puff, 1 puff, Inhalation, Q12H [address], Sally Jones PA-C, 1 puff at 12/02/22 0815  •  formoterol (PERFOROMIST) nebulizer solution 20 mcg, 20 mcg, Nebulization, Q12H, Sally Kaiser PA-C, 20 mcg at 12/02/22 4120  •  gabapentin (NEURONTIN) capsule 100 mg, 100 mg, Oral, TID, Sally Jones PA-C, 100 mg at 12/02/22 6898  •  insulin glargine (LANTUS) subcutaneous injection 36 Units 0 36 mL, 36 Units, Subcutaneous, HS, Leigha [place], DO, 36 Units at 12/01/22 2105  •  insulin lispro (HumaLOG) 100 units/mL subcutaneous injection 1-6 Units, 1-6 Units, Subcutaneous, TID AC, 1 Units at 12/02/22 1231 **AND** Fingerstick Glucose (POCT), , , TID AC, Sally Jones PA-C  •  insulin lispro (HumaLOG) 100 units/mL subcutaneous injection 5 Units, 5 Units, Subcutaneous, TID With Meals, Zainab Bentley PA-C, 5 Units at 12/02/22 1232  •  ipratropium (ATROVENT) 0 02 % inhalation solution 0 5 mg, 0 5 mg, Nebulization, Q6H, Sally Jones PA-C, 0 5 mg at 12/02/22 1731  •  isosorbide mononitrate (IMDUR) 24 hr tablet 30 mg, 30 mg, Oral, Daily, Sally Jones PA-C, 30 mg at 12/02/22 9840  •  levalbuterol (XOPENEX) inhalation solution 1 25 mg, 1 25 mg, Nebulization, Q6H, Sally Jones PA-C, 1 25 mg at 12/02/22 0813  •  ondansetron (ZOFRAN) injection 4 mg, 4 mg, Intravenous, Q6H PRN, Zainab Benltey PA-C  •  pantoprazole (PROTONIX) EC tablet 40 mg, 40 mg, Oral, BID AC, Sally Jones PA-C, 40 mg at 12/02/22 4179  •  pravastatin (PRAVACHOL) tablet 80 mg, 80 mg, Oral, Daily With Dinner, Zainab Bentley PA-C, 80 mg at 12/01/22 1642  •  QUEtiapine (SEROquel) tablet 12 5 mg, 12 5 mg, Oral, HS, Sally Jones PA-C, 12 5 mg at 12/01/22 2106  •  senna-docusate sodium (SENOKOT S) 8 6-50 mg per tablet 1 tablet, 1 tablet, Oral, HS, Sally Jones PA-C, 1 tablet at 12/01/22 2105  •  tamsulosin (FLOMAX) capsule 0 4 mg, 0 4 mg, Oral, Daily With Dinner, Zainab Bentley PA-C, 0 4 mg at 12/01/22 1642  •  torsemide (DEMADEX) tablet 60 mg, 60 mg, Oral, Daily, Андрей Yip MD, 60 mg at 12/02/22 0811    Laboratory Results:  Results from last 7 days   Lab Units 12/02/22  0431 12/01/22  0437 11/30/22  0628 11/29/22  0606 11/28/22  0455 11/27/22  0834 11/27/22  0518 11/26/22  0504   WBC Thousand/uL 7 53 9 02 8 51 7 35 7 43  --  6 81 6 06   HEMOGLOBIN g/dL 7 5* 7 1* 7 6* 7 4* 7 6*  --  7 8* 7 4*   HEMATOCRIT % 25 8* 24 2* 25 7* 26 0* 26 1*  --  26 2* 26 0*   PLATELETS Thousands/uL 172 174 168 153 154  --  141* 136*   SODIUM mmol/L 136 138 139 139 139  --  135 142   POTASSIUM mmol/L 4 0 4 4 4 5 4 6 4 8 4 5 5 7* 4 3   CHLORIDE mmol/L 95* 96 98 98 99  --  99 104   CO2 mmol/L 39* 39* 38* 37* 37*  --  31 32   BUN mg/dL 45* 47* 47* 51* 52*  --  54* 58*   CREATININE mg/dL 2 62* 2 43* 2 61* 2 61* 2 61*  --  2 41* 2 58*   CALCIUM mg/dL 8 5 8 6 8 6 8 4 8 2*  --  8 4 8 3   MAGNESIUM mg/dL  --   --   --  2 4 2 2  --  2 5 2 5   PHOSPHORUS mg/dL  --   --   --  4 1 3 9  --  3 7 3 5       CT chest wo contrast   Final Result by Sea Quintana MD (12/01 1375)      Unchanged small right pleural effusion  Slightly increased small left pleural effusion  Chronic right lung volume loss and right basilar rounded atelectasis  Workstation performed: [id number]         XR chest portable   Final Result by Chitra Joseph MD (11/30 1035)      Stable volume loss and opacification of the right hemithorax possibly scarring and effusion  An element of atelectasis and/or pneumonia difficult to exclude  Workstation performed: [id number]         XR chest portable ICU   Final Result by Lidia Del Rio MD (11/28 1137)      Persistent right effusion and right base atelectasis  Pneumonia not excluded in the appropriate setting  Workstation performed: [id number]         XR chest portable ICU   Final Result by Jeremiah Kerr MD (11/25 0488)      Unchanged moderate right pleural effusion, and right hemithorax volume loss with right basilar atelectasis                    Workstation performed: [id number]         US kidney and bladder   Final Result by Lorena Reynolds MD (11/25 7052)      No evidence of obstruction  Ascites  Cholelithiasis  Workstation performed: [id number]         CT head wo contrast   Final Result by Jayson Rolon MD (11/24 1814)      No acute intracranial abnormality  Chronic microangiopathic changes  Workstation performed: [id number]         XR chest portable   Final Result by Alon Lopez MD (11/24 1515)         1  New rounded cavity in the left base raises concern for developing pneumonia  Otherwise, negative findings in the chest       The study was marked in [facility] for immediate notification  Workstation performed: [id number]         IR IN-Patient Thoracentesis   Final Result by Natacha Chambers MD (11/23 1918)   Impression:   1  Successful ultrasound-guided thoracentesis yielding 900 mL of serosanguineous pleural fluid  Workstation performed: [id number]         XR chest portable ICU   Final Result by Lorena Reynolds MD (11/21 1014)      Moderate right pleural effusion with associated right lung base opacity  Workstation performed: [id number]         CT chest wo contrast   Final Result by Lesly Maldonado MD (11/20 1758)      1  Increase in moderate right pleural effusion with new small left effusion  2   New near complete occlusion of middle lobe and right lower lobe bronchial branches with atelectasis  3  New small perihepatic ascites  The study was marked in [facility] for immediate notification  Workstation performed: [id number]         XR chest 1 view portable   Final Result by Leyla Victor MD (11/20 2056)      Increased size right pleural effusion    Workstation performed: [id number]             Portions of the record may have been created with voice recognition software   Occasional wrong word or "sound a like" substitutions may have occurred due to the inherent limitations of voice recognition software  Read the chart carefully and recognize, using context, where substitutions have occurred

## 2022-12-02 NOTE — DISCHARGE SUMMARY
[address] [facility]  Discharge- John Conley Sr  1945, 78 y o  male MRN: [id number]  Unit/Bed#: [facility] 531-01 Encounter: [id number]  Primary Care Provider: Aditi Benz MD   Date and time admitted to hospital: 11/20/2022 10:40 AM    * Acute on chronic respiratory failure with hypoxia and hypercapnia [facility])  Assessment & Plan  Acute on chronic respiratory failure  Patient admitted for acute heart failure exacerbation initial requiring 50 L high-flow now weaned down to 6 L nasal cannula  Now down to baseline 2L  Likely CHFand PNA  11/30 pt appears slightly tachypnic  VBG chronic resp acidosis  CXR right lung still w/ opacity  CT chest showed unchanged effusion  Today pt comfortable  Pt will need thoras prn comfort    Acute on chronic diastolic heart failure (HCC)  Assessment & Plan  Wt Readings from Last 3 Encounters:   12/02/22 101 kg (223 lb)   11/09/22 103 kg (227 lb 3 2 oz)   11/01/22 97 4 kg (214 lb 11 7 oz)       Patient with history of chronic diastolic heart failure  Presenting with shortness of breath worsening leg swelling for the past day  Patient examined volume overloaded, crackles on exam, lower extremity edema  IV Bumex -> Torsemide  Home dose was 40 mg Lasix daily  Fluid restriction, sodium restriction  Monitor urine output, check daily weight      Sepsis (HCC)-resolved as of 12/2/2022  Assessment & Plan  Sepsis as evidenced by leukocytosis, tachypnea, secondary to suspected pneumonia and Pseudomonas bacteremia  ID apprecaited  Completed 10 days Cefepime/Fortaz    Pleural effusion on right  Assessment & Plan  • Patient with documented chronic recurrent pleural effusion on the right   • S/p Thoracentesis on 11/08/22  ? Cytology: Minute cluster of atypical cells noted in cellblock material only  ?  Atypical cluster is favored to represent contamination during specimen processing; however, given the patient's history of lung adenocarcinoma, involvement by malignancy cannot be definitively ruled out  · Previous cytology from fluid negative for malignancy  · Pt still has effusion but asymptomatic  Can have OP thoras prn SOB    Bacteremia due to Pseudomonas-resolved as of 12/2/2022  Assessment & Plan  · C/w Lillia Litter  · Likely 2/2 UTI    Neurocognitive disorder  Assessment & Plan  · Neuropsych appreciated  · Pt not competent    Anemia  Assessment & Plan  · FOBT pos 10/19  · Hgb stable overall  · OP f/u w/ GI  · Given Venofer  · Resume iron at d/c    A-fib [facility])  Assessment & Plan  Continue Eliquis and carvedilol    Acute kidney injury superimposed on chronic kidney disease [facility])  Assessment & Plan  Lab Results   Component Value Date    EGFR 22 12/02/2022    EGFR 25 12/01/2022    EGFR 22 11/30/2022    CREATININE 2 62 (H) 12/02/2022    CREATININE 2 43 (H) 12/01/2022    CREATININE 2 61 (H) 11/30/2022   Estimated Creatinine Clearance: 30 mL/min (A) (by C-G formula based on SCr of 2 62 mg/dL (H))    Creatinine baseline seems to be around 1 8-2  MARANDA evolving on admission  Peaked at 3 2 and now trending down  Renal appreciated  Likely new b/l 2 6    Adenocarcinoma of lung [facility])  Assessment & Plan  Patient with history of adeno carcinoma status post radiation therapy    CAD (coronary artery disease)  Assessment & Plan  Patient with history of CAD with stenting in 2015  Stress test in May of this year negative  Will continue aspirin, statin, beta-blocker    Type 2 diabetes mellitus with hyperglycemia, with long-term current use of insulin [facility])  Assessment & Plan  Lab Results   Component Value Date    HGBA1C 9 7 (H) 10/10/2022       Recent Labs     12/01/22  1606 12/01/22  2035 12/02/22  0604 12/02/22  1121   POCGLU 139 248* 137 161*       Blood Sugar Average: Last 72 hrs:  (P) 199 [id number]     Home regimen included 30 units at bedtime, 10 units with meals  Will c/w Lantus 36U at bedtime, c/w 5 units Humalog with meals, still continue sliding scale  DM diet    COPD (chronic obstructive pulmonary disease) ([facility] )  Assessment & Plan  Patient with history of COPD on 2 L nasal cannula baseline  Do not believe patient has a COPD exacerbation at this time  Currently on baseline 2L  D/c on nebs as they appear to help pt    Pneumonia-resolved as of 12/2/2022  Assessment & Plan  · ID appreciated  · Completed 10 days Cefepime/Fortaz      Medical Problems     Resolved Problems  Date Reviewed: 12/1/2022          Resolved    Sepsis ([facility] ) 12/2/2022     Resolved by  Krishan Harris, DO    Pneumonia 12/2/2022     Resolved by  Krishan Brain, DO    Bacteremia due to Pseudomonas 12/2/2022     Resolved by  Krishan Brain, DO        Discharging Physician / Practitioner: Krishan Harris DO  PCP: Gerald Candelario MD  Admission Date:   Admission Orders (From admission, onward)     Ordered        11/20/22 1336  [facility] [facility] [address]  Once                      Discharge Date: 12/02/22    Consultations During Hospital Stay:  · Pulm  · Pharmacy  · Renal  · ID  · Palliative  · Neuropsych    Procedures Performed:   · Radha Mcclain 11/23    Significant Findings / Test Results:   · See above    Incidental Findings:   · none       Test Results Pending at Discharge (will require follow up):   · none     Outpatient Tests Requested:  · Rehab should monitor BMP    Complications:  Pt did have episode 11/24 or worsening resp fail likely due to not being complaint w/ Bipap    Reason for Admission: Critical access hospital & [facility] Course:   Asia Jimenez Sr  is a 78 y o  male patient who originally presented to the hospital on 11/20/2022 due to SOB  Found to have PNA and HF  Was in ICU twice - see CIU notes  Found to have pseudomonas bacteremia - completed 10 days Cefepime/Fortaz  Also w/ CHF diuresed w/ IV Bumex and transitioned to Torsemide  Needs Bipap qhs - was more compliant  Dc/ to Rehab  Please see above list of diagnoses and related plan for additional information       Condition at Discharge: stable    Discharge Day Visit / Exam: Subjective:  No acute complaints  Vitals: Blood Pressure: 113/64 (12/02/22 0656)  Pulse: 63 (12/02/22 0656)  Temperature: 98 °F (36 7 °C) (12/02/22 0656)  Temp Source: Oral (12/01/22 1518)  Respirations: 18 (12/01/22 2343)  Height: 6' (182 9 cm) (11/20/22 2133)  Weight - Scale: 101 kg (223 lb) (12/02/22 0600)  SpO2: 100 % (12/02/22 0813)  Exam:   Physical Exam  HENT:      Head: Normocephalic and atraumatic  Nose: Nose normal       Mouth/Throat:      Mouth: Mucous membranes are moist    Eyes:      Extraocular Movements: Extraocular movements intact  Conjunctiva/sclera: Conjunctivae normal    Cardiovascular:      Rate and Rhythm: Normal rate and regular rhythm  Pulmonary:      Effort: Pulmonary effort is normal       Comments: Decreased BS  Abdominal:      General: Bowel sounds are normal       Palpations: Abdomen is soft  Musculoskeletal:         General: Normal range of motion  Cervical back: Normal range of motion and neck supple  Right lower leg: No edema  Left lower leg: No edema  Skin:     General: Skin is warm and dry  Neurological:      Mental Status: He is alert and oriented to person, place, and time  Discussion with Family: Updated  (son) via phone  Discharge instructions/Information to patient and family:   See after visit summary for information provided to patient and family  Provisions for Follow-Up Care:  See after visit summary for information related to follow-up care and any pertinent home health orders  Disposition:   Other [facility] ''R''  Readmission: no     Discharge Statement:  I spent 35 minutes discharging the patient  This time was spent on the day of discharge  I had direct contact with the patient on the day of discharge   Greater than 50% of the total time was spent examining patient, answering all patient questions, arranging and discussing plan of care with patient as well as directly providing post-discharge instructions  Additional time then spent on discharge activities  Discharge Medications:  See after visit summary for reconciled discharge medications provided to patient and/or family        **Please Note: This note may have been constructed using a voice recognition system**

## 2022-12-02 NOTE — CASE MANAGEMENT
Case Management Discharge Planning Note    Patient name Mercedes Vaughn   Location [facility] 531/[facility] 534-48 MRN [id number]  : 1945 Date 2022       Current Admission Date: 2022  Current Admission Diagnosis:Acute on chronic respiratory failure with hypoxia and hypercapnia [facility])   Patient Active Problem List    Diagnosis Date Noted   • Neurocognitive disorder 2022   • Bacteremia due to Pseudomonas 2022   • Sepsis ([facility] ) 2022   • Pneumonia 2022   • Acute on chronic diastolic heart failure ([facility] ) 2022   • Shortness of breath 2022   • Anemia 10/29/2022   • Acute-on-chronic kidney injury ([facility] ) 10/27/2022   • SIRS (systemic inflammatory response syndrome) ([place] [address] ) 10/27/2022   • A-fib ([place] [address] ) 10/10/2022   • Frequent hospital admissions 2022   • Hypothermia 2022   • Epididymitis, bilateral 06/15/2022   • Chronic left-sided low back pain without sciatica 2022   • Acute kidney injury superimposed on chronic kidney disease ([facility] ) 2022   • History of prostate cancer 2022   • Adenocarcinoma of lung ([facility] ) 2022   • Fracture of navicular bone of left foot 2021   • Acute on chronic respiratory failure with hypoxia and hypercapnia ([facility] ) 04/15/2021   • PAD (peripheral artery disease) ([facility] ) 2021   • DDD (degenerative disc disease), lumbar 2020   • Anemia, iron deficiency 2020   • Lung nodule 2020   • Pulmonary hypertension ([facility] ) 2019   • JACQUELINE (obstructive sleep apnea) 2019   • COPD with acute exacerbation ([facility] ) 2019   • Oxygen dependent 2018   • Acute metabolic encephalopathy    • RBBB 2018   • CAD (coronary artery disease) 2018   • Chronic diastolic heart failure ([facility] ) 2018   • Pleural effusion on right 10/31/2017   • Diabetic neuropathy ([facility] ) 2017   • Essential hypertension 2016   • Venous stasis dermatitis of both lower extremities 11/15/2016   • Type 2 diabetes mellitus with hyperglycemia, with long-term current use of insulin ([facility] ) 11/13/2016   • COPD (chronic obstructive pulmonary disease) ([facility] ) 01/20/2016   • HLD (hyperlipidemia) 01/20/2016      LOS (days): 12  Geometric Mean LOS (GMLOS) (days): 5 00  Days to GMLOS:-6 9     OBJECTIVE:  Risk of Unplanned Readmission Score: 84 31         Current admission status: Inpatient   Preferred Pharmacy:   [address] [place] [address] [address] [place] [address]  Phone: [phone] Fax: [address]  Phone: [phone] Fax: [phone]    Primary Care Provider: Wilfred Bishop MD    Primary Insurance: [address] Number: [id number]

## 2022-12-02 NOTE — CASE MANAGEMENT
Case Management Discharge Planning Note    Patient name Stephanie Ybarra  [facility] MRN [id number]  : 1945 Date 2022       Current Admission Date: 2022  Current Admission Diagnosis:Acute on chronic respiratory failure with hypoxia and hypercapnia (HCC)      LOS (days): 12  Geometric Mean LOS (GMLOS) (days): 5 00  Days to GMLOS:-7     OBJECTIVE:  Risk of Unplanned Readmission Score: 84 31   Current admission status: Inpatient   Primary Insurance: [facility] REP    DISCHARGE DETAILS:    Discharge planning discussed with[de-identified] Maximiliano Myles (pt's son)  Freedom of Choice: Yes  CM contacted family/caregiver?: Yes  Were Treatment Team discharge recommendations reviewed with patient/caregiver?: Yes  Did patient/caregiver verbalize understanding of patient care needs?: Yes  Were patient/caregiver advised of the risks associated with not following Treatment Team discharge recommendations?: Yes    Contacts  Patient Contacts: Maximiliano Myles (pt's son)  Relationship to Patient[de-identified] Family  Contact Method: Phone  Phone Number: [phone] (mobile)  Reason/Outcome: Continuity of Care, Emergency Contact, Referral, Discharge Planning    Treatment Team Recommendation: Short Term Rehab  Discharge Destination Plan[de-identified] Short Term Rehab  Transport at Discharge : [facility]  Dispatcher Contacted: Yes  Number/Name of Dispatcher: [phone]  Transported by [facility] and Unit #): [facility]  ETA of Transport (Date): 22  ETA of Transport (Time): 1330 (1:30PM)  Transfer Mode: Stretcher  Accompanied by: EMS personnel  Transfer Equipment: BLS devices    Additional Comments: Pt is cleared for d/c by VIKTORIA Downing  RN Aniya Angulo was notified of pt's d/c order  Pt is accepted for short-term skilled rehab placement at [facility] located in [facility]  CM obtained auth# [id number] for SNF placement from pt's [facility]-replacement insurance   CM arranged BLS ambulance via [facility] for 1:30PM pickup this day to transport pt to SNF  No Maureen Fox is required for transport from pt's insurance due to [id number] transfer  The pt and his son Mi Fernandez were both informed of d/c  IMM signed by son  CMN for transport completed by CM  Chart copy for SNF requested from RN  HUMA to follow      Accepting Facility Name, [address] : [place] [place] / [place]  Receiving Facility/Agency Phone Number: [phone]  Facility/Agency Fax Number: [place] Number: [id number]

## 2022-12-02 NOTE — PROGRESS NOTES
Progress Note - Infectious Disease   Pavel Webster Sr  78 y o  male MRN: [id number]  Unit/Bed#: [facility] 531-01 Encounter: [id number]      Impression/Plan:  1  Sepsis-present on admission   Leukocytosis and tachypnea   Appears to be secondary to Pseudomonas bacteremia from urinary tract infection  No other definitive source appreciated   The patient has remained hemodynamically stable off all antibiotics status post a 10 day treatment course  -no additional antibiotics  -recheck CBC with diff and BMP  -supportive care     2  Pseudomonas bacteremia-appears to be secondary to urinary tract infection in the setting of urinary retention   No other clear source appreciated   Could represent bacteremia from a pneumonia but the patient's pulmonary status seems to be around baseline and the CT scan has some chronic findings of the right base   Repeat blood cultures negative  Patient completed a 10 day course of intravenous anti pseudomonal treatment  -no additional antibiotics indicated  -monitor for recurrence     3  Pseudomonas UTI-in the setting of urinary retention   The patient now has a Cook catheter in place   Ultrasound without hydronephrosis or abscess  Patient completed a 10 day course of intravenous anti pseudomonal treatment  -Cook drainage  -monitor symptomatology       4  Acute kidney injury-complicating chronic kidney disease   Renal function worsening   Suspected multifactorial including cardiorenal syndrome, sepsis, and possible ATN   Pre renal issues may be playing a role   Renal function has improved and now stabilized  -recheck BMP  -volume management  -close nephrology follow-up     5  Acute hypoxic respiratory failure-with increased pleural effusion on the setting of known adenocarcinoma of the lung   Volume loss the right base seems somewhat chronic   His respiratory status has deteriorated and he is now back on BiPAP   He underwent thoracentesis on 11/23/2022    Repeat CT of the chest 12/1/2022 with small effusions, volume loss, atelectasis particularly on the right but no enlarging effusion or area of consolidation  Pleural cultures negative  Most recent pleural cytology negative  -oxygen support  -monitor respiratory status     6  Acute encephalopathy-suspect multifactorial including metabolic issues, sepsis   Waxing waning but now with decreased cognition in the setting of CO2 retention   Improvement of the CO2 retention but still with some cognitive abnormalities   Improved  -treatment of respiratory failure  -monitor cognition     7  Diabetes mellitus-type 2 with hyperglycemia with long-term insulin use     Discussed the above management plan with the primary service    No active infectious disease issues  We will sign off  Please call if questions  Antibiotics:  Status post 10 day course of anti pseudomonal treatment  Day 3 off antibiotics    Subjective:  Patient has no fever, chills, sweats; no nausea, vomiting, diarrhea; no cough, shortness of breath; no pain  No new symptoms  Still requiring oxygen by nasal cannula    Objective:  Vitals:  Temp:  [97 8 °F (36 6 °C)-98 2 °F (36 8 °C)] 98 °F (36 7 °C)  HR:  [61-63] 63  Resp:  [18] 18  BP: (110-113)/(55-73) 113/64  SpO2:  [97 %-100 %] 100 %  Temp (24hrs), Av °F (36 7 °C), Min:97 8 °F (36 6 °C), Max:98 2 °F (36 8 °C)  Current: Temperature: 98 °F (36 7 °C)    Physical Exam:   General Appearance:  Alert, interactive, nontoxic, no acute distress  Throat: Oropharynx moist without lesions  Lungs:   Decreased breath sounds bilaterally; no wheezes, rhonchi or rales; respirations unlabored   Heart:  RRR; no murmur, rub or gallop   Abdomen:   Soft, non-tender, non-distended, positive bowel sounds  Extremities: No clubbing, cyanosis or edema   Skin: No new rashes or lesions  No draining wounds noted         Labs, Imaging, & Other studies:   All pertinent labs and imaging studies were personally reviewed  Results from last 7 days   Lab Units 12/02/22 0431 12/01/22 0437 11/30/22  0628   WBC Thousand/uL 7 53 9 02 8 51   HEMOGLOBIN g/dL 7 5* 7 1* 7 6*   PLATELETS Thousands/uL 172 174 168     Results from last 7 days   Lab Units 12/02/22 0431 12/01/22 0437 11/30/22  0628 11/29/22  0606 11/28/22  0455 11/27/22  0834 11/27/22  0518   SODIUM mmol/L 136 138 139 139 139  --  135   POTASSIUM mmol/L 4 0 4 4 4 5 4 6 4 8   < > 5 7*   CHLORIDE mmol/L 95* 96 98 98 99  --  99   CO2 mmol/L 39* 39* 38* 37* 37*  --  31   BUN mg/dL 45* 47* 47* 51* 52*  --  54*   CREATININE mg/dL 2 62* 2 43* 2 61* 2 61* 2 61*  --  2 41*   EGFR ml/min/1 73sq m 22 25 22 22 22  --  25   CALCIUM mg/dL 8 5 8 6 8 6 8 4 8 2*  --  8 4   AST U/L  --   --   --  6 7  --  48*   ALT U/L  --   --   --  9* 9*  --  11*   ALK PHOS U/L  --   --   --  59 59  --  67    < > = values in this interval not displayed

## 2022-12-02 NOTE — TELEPHONE ENCOUNTER
----- Message from Juan Jose Vásquez MD sent at 12/2/2022  3:30 PM EST -----  He got discharged from [facility] today    He will need repeat BMP in one week and office follow-up in 3 to 4 weeks with AP

## 2022-12-02 NOTE — PHYSICAL THERAPY NOTE
Physical Therapy Cancellation Note           12/02/22 1133   PT Last Visit   PT Visit Date 12/02/22   Note Type   Note Type Cancelled Session   Cancel Reasons Refusal  (PT attempted to see pt, however, pt currently refusing  Pt reports he is too tired and will ambulate later    PT to continue to follow and see pt as appropriate and able )     Francisco Christian, PT, DPT

## 2022-12-02 NOTE — RESTORATIVE TECHNICIAN NOTE
Restorative Technician Note      Patient Name: Dustin Earl  Note Type: Mobility  Patient Position Upon Consult: Supine  Activity Performed: Ambulated; [id number]; Stood; Transferred  Assistive Device: Roller walker  Patient Position at [employer] of Consult: Bedside chair;  All needs within reach

## 2022-12-05 ENCOUNTER — TELEPHONE (OUTPATIENT)
Dept: NEPHROLOGY | Facility: CLINIC | Age: 77
End: 2022-12-05

## 2022-12-05 DIAGNOSIS — N17.9 AKI (ACUTE KIDNEY INJURY) (HCC): Primary | ICD-10-CM

## 2022-12-05 NOTE — UTILIZATION REVIEW
NOTIFICATION OF ADMISSION DISCHARGE   This is a Notification of Discharge from [address]  Please be advised that this patient has been discharge from our facility  Below you will find the admission and discharge date and time including the patient’s disposition  UTILIZATION REVIEW CONTACT:  Juvenal Stanton  Utilization   Network Utilization Review Department  Phone: [phone] x carefully listen to the prompts  All voicemails are confidential   Email: [email or web address]     ADMISSION INFORMATION  PRESENTATION DATE: 11/20/2022 10:40 AM  OBERVATION ADMISSION DATE:   INPATIENT ADMISSION DATE: 11/20/22  1:36 PM   DISCHARGE DATE: 12/2/2022  3:14 PM   DISPOSITION:Non [facility] SNF/TCU/SNU    IMPORTANT INFORMATION:  Send all requests for admission clinical reviews, approved or denied determinations and any other requests to dedicated fax number below belonging to the campus where the patient is receiving treatment   List of dedicated fax numbers:  [address] DENIALS (Administrative/Medical Necessity) [phone]   [address] (Maternity/NICU/Pediatrics) [phone]   [facility] [phone]   [facility] 87 [phone]   [address] [phone]   220 [facility] [phone] [address] [facility] [phone]   [address] [place] [address] [phone]   [facility]  [phone]   40[facility] [phone] 412 [facility] 850 [address] [facility] [phone]

## 2022-12-05 NOTE — TELEPHONE ENCOUNTER
Spoke with patients son and stated he is in a rehab facility but will let him know to call and schedule a hospital follow up  Labs were placed and mailed

## 2022-12-05 NOTE — ASSESSMENT & PLAN NOTE
Gail Herbert NP saw the pt on 12/02/2022 for  s/s. The pt was treated with Ciprofloxacin HCl 500 mg Oral 2 TIMES DAILY and Phenazopyridine HCl 200 mg Oral 3 TIMES DAILY PRN. The urine culture came back >100,000 CFU/mL Escherichia coli Abnormal. Which is resistant to e. Coli. RN spoke with Dr Alfaro. He states the pt should stop taking the Cipro and and change the Ax to Cefdinir 300 mg take one tablet BID for 5 days, #10. RN called [phone] and LM for the pt to call back to relay the message and ask which pharmacy  She would like the medication to go to.       · Morbid obesity- BMI 39  · Lifestyle modification  · diet exercise and weight loss

## 2022-12-12 NOTE — TELEPHONE ENCOUNTER
Received a call from [facility] and scheduled appointment   Faxed lab script to [phone]

## 2022-12-13 ENCOUNTER — NURSING HOME VISIT (OUTPATIENT)
Dept: WOUND CARE | Facility: HOSPITAL | Age: 77
End: 2022-12-13

## 2022-12-13 DIAGNOSIS — E11.621 DIABETIC ULCER OF TOE OF RIGHT FOOT ASSOCIATED WITH TYPE 2 DIABETES MELLITUS, UNSPECIFIED ULCER STAGE (HCC): ICD-10-CM

## 2022-12-13 DIAGNOSIS — E11.65 TYPE 2 DIABETES MELLITUS WITH HYPERGLYCEMIA, WITH LONG-TERM CURRENT USE OF INSULIN (HCC): Primary | Chronic | ICD-10-CM

## 2022-12-13 DIAGNOSIS — Z79.4 TYPE 2 DIABETES MELLITUS WITH HYPERGLYCEMIA, WITH LONG-TERM CURRENT USE OF INSULIN (HCC): Primary | Chronic | ICD-10-CM

## 2022-12-13 DIAGNOSIS — L97.519 DIABETIC ULCER OF TOE OF RIGHT FOOT ASSOCIATED WITH TYPE 2 DIABETES MELLITUS, UNSPECIFIED ULCER STAGE (HCC): ICD-10-CM

## 2022-12-13 NOTE — ASSESSMENT & PLAN NOTE
Lab Results   Component Value Date    HGBA1C 9 7 (H) 10/10/2022    A1C results reviewed with the patient today    -Under the care of geriatric team

## 2022-12-13 NOTE — PROGRESS NOTES
[address] MANAGEMENT   AND [facility]PER[facility]ENTER       Patient ID: Rachel Traore  is a 78 y o  male Date of Birth 1945     Location of Service: [place] [address]    Performed wound round with: Wound team     Chief Complaint : Right foot 1st and 2nd toe    Wound Instructions:  Wound:  Right first and second toes  Discontinue previous wound order  Cleanse the wound bed with NSS   Apply betadine to wound bed  Frequency : daily and prn for soiling  Use open toe shoe on the right foot  Offload all wounds  Turn and reposition frequently  Increase protein intake  Monitor for any sign of infection or worsening, inform PCP or patient's primary physician in your facility  Allergies  Crestor [rosuvastatin], Lisinopril, and Metformin      Assessment & Plan:  1  Type 2 diabetes mellitus with hyperglycemia, with long-term current use of insulin [facility])  Assessment & Plan:    Lab Results   Component Value Date    HGBA1C 9 7 (H) 10/10/2022    A1C results reviewed with the patient today    -Under the care of geriatric team      2  Diabetic ulcer of toe of right foot associated with type 2 diabetes mellitus, unspecified ulcer stage [facility])  Assessment & Plan:    Lab Results   Component Value Date    HGBA1C 9 7 (H) 10/10/2022   -Wound on the right first and second toe  -Both wounds are covered with 100% eschar, stable, with no obvious sign of infection  -Local wound care with Betadine  -Order open toe shoes  -There is no recent x-ray of the right foot or arterial duplex  Will order if the wound worsened   -Patient to follow-up with podiatry on discharge  -We will continue to follow-up weekly while in the facility  Subjective:   12/13/2022This is a 78 y o , male referred to our service for wound/ skin alterations on right first and second toe  Patient have a complex medical history including but not limited to Cardiac disease, CHF (congestive heart failure) ,  Diabetes mellitus ([address] ), Dyspnea, GERD (gastroesophageal reflux disease), Hyperlipidemia, Hypertension, MI (myocardial infarction) ([facility] [address] )  Patient was referred by Senior Care Team  Patient was seen in collaboration with the facility wound team      Received patient in bed, seems comfortable  Patient is a poor historian and was not able to provide information related to the wound  As per staff report, patient was admitted to the diabetic ulcer on the right first toe and second toe  Parent treatment is Betadine  Patient is independent with mobility  Blood sugar ranging from 300- 160 for the past few days  There is no record of podiatry consult for the wound on the right foot  Patient was previously seen by podiatrist for different problem with the foot  Review of Systems   Constitutional: Negative  HENT: Negative  Eyes: Negative  Respiratory: Negative  Cardiovascular: Negative  Gastrointestinal: Negative  Endocrine: Negative  Genitourinary: Negative  Musculoskeletal: Positive for gait problem  Skin: Positive for wound  See HPI   Hematological: Negative  Psychiatric/Behavioral: Negative  All other systems reviewed and are negative  Objective:    Physical Exam  Constitutional:       Appearance: Normal appearance  HENT:      Head: Normocephalic and atraumatic  Cardiovascular:      Pulses: Normal pulses  Pulmonary:      Effort: Pulmonary effort is normal    Abdominal:      General: Abdomen is flat  Genitourinary:     Comments: Continent  Musculoskeletal:      Cervical back: Normal range of motion  Right lower leg: Edema present  Left lower leg: Edema present  Comments: +DP and PT on right foot   Skin:     Findings: Lesion present  Comments: Right second toe -wound size   2 x 0 2 cm ,  100% eschar, no drainage, no obvious sign of infection    Right first toe -wound size is 1 5 x 1 5 cm ,  100% eschar, stable, no drainage, no obvious sign of infection, no tenderness on palpation   Neurological:      Mental Status: He is alert and oriented to person, place, and time  Psychiatric:         Mood and Affect: Mood normal          Behavior: Behavior normal               Procedures           Patient's care was coordinated with nursing facility staff  Recent vitals, labs and updated medications were reviewed on EMR or chart system of facility  Past Medical, surgical, social, medication and allergy history and patient's previous records were reviewed and updated as appropriate: Most up-to date information is available in the facility EMR where the patient is currently admitted      Patient Active Problem List   Diagnosis   • COPD (chronic obstructive pulmonary disease) ([facility])   • HLD (hyperlipidemia)   • Type 2 diabetes mellitus with hyperglycemia, with long-term current use of insulin ([facility])   • Venous stasis dermatitis of both lower extremities   • Pleural effusion on right   • Chronic diastolic heart failure ([facility])   • Essential hypertension   • Diabetic neuropathy ([facility])   • CAD (coronary artery disease)   • Acute metabolic encephalopathy   • RBBB   • Oxygen dependent   • COPD with acute exacerbation ([facility])   • Pulmonary hypertension ([facility])   • JACQUELINE (obstructive sleep apnea)   • Lung nodule   • DDD (degenerative disc disease), lumbar   • Anemia, iron deficiency   • PAD (peripheral artery disease) ([facility])   • Acute on chronic respiratory failure with hypoxia and hypercapnia ([facility])   • Fracture of navicular bone of left foot   • Adenocarcinoma of lung ([facility])   • History of prostate cancer   • Acute kidney injury superimposed on chronic kidney disease ([facility])   • Chronic left-sided low back pain without sciatica   • Epididymitis, bilateral   • Hypothermia   • Frequent hospital admissions   • A-fib [facility])   • Acute-on-chronic kidney injury ([facility] [address] )   • SIRS (systemic inflammatory response syndrome) ([facility])   • Anemia   • Shortness of breath   • Acute on chronic diastolic heart failure ([facility] [address] )   • Neurocognitive disorder   • Diabetic ulcer of toe of right foot associated with type 2 diabetes mellitus ([place] [address] )     Past Medical History:   Diagnosis Date   • Abdominal pain    • MARANDA (acute kidney injury) ([place] [address] ) 2018   • Cardiac disease    • CHF (congestive heart failure) ([facility])    • COPD, severe ([facility])    • Coronary artery disease    • DDD (degenerative disc disease), lumbar 2020   • Diabetes mellitus ([place] [address] )    • Dyspnea    • GERD (gastroesophageal reflux disease)    • Hyperlipidemia    • Hypertension    • MI (myocardial infarction) ([place] [address] )     with 3 stents   • Nodule of apex of right lung    • JACQUELINE (obstructive sleep apnea)    • Prostate cancer [facility])      Past Surgical History:   Procedure Laterality Date   • ABDOMINAL SURGERY      exploratory   • ANGIOPLASTY      3 stents   • APPENDECTOMY     • COLONOSCOPY     • CT NEEDLE BIOPSY LUNG  11/3/2020   • ESOPHAGOGASTRODUODENOSCOPY N/A 10/2/2017    Procedure: ESOPHAGOGASTRODUODENOSCOPY (EGD); Surgeon: Steve Gordon MD;  Location: [facility]; Service: Gastroenterology   • IR THORACENTESIS  11/3/2020   • IR THORACENTESIS  2022   • IR THORACENTESIS  2022   • KNEE CARTILAGE SURGERY     • OTHER SURGICAL HISTORY      stent placement   • PROSTATE SURGERY     • SKIN GRAFT      Basal cell CA back     Social History     Socioeconomic History   • Marital status:       Spouse name: None   • Number of children: 1   • Years of education: 8   • Highest education level: None   Occupational History   • None   Tobacco Use   • Smoking status: Former     Packs/day: 1 50     Years: 50 00     Pack years: 75 00     Types: Cigarettes     Start date: 38     Quit date: 2020     Years since quittin 4   • Smokeless tobacco: Never   Vaping Use   • Vaping Use: Never used   Substance and Sexual Activity   • Alcohol use: Never   • Drug use: No   • Sexual activity: Not Currently     Partners: Female   Other Topics Concern   • None   Social History Narrative    Most recent tobacco use screenin2018    Do you currently or have you served in [employer]: No    Were you activated, into active duty, as a member of the [employer] or as a Reservist: No    Caffeine intake: Moderate    Alcohol intake: None    Marital status:     Number of children: 1    Education: 8    Occupation: retired    Sexual orientation: Heterosexual    General stress level: Medium    Live alone or with others: with others    Exercise level: None    Sexually active: No    Overweight: Yes    Obese: Yes    Guns present in home: No    Seat belts used routinely: Yes    Advance directive: No    Sunscreen used routinely: No    Smoke alarm in home: Yes    Legally blind in one or both eyes: No    Hard of hearing or deaf in one or both ears: No     Social Determinants of Health     Financial Resource Strain: Not on file   Food Insecurity: No Food Insecurity   • Worried About Running Out of Food in the Last Year: Never true   • Ran Out of Food in the Last Year: Never true   Transportation Needs: No Transportation Needs   • Lack of Transportation (Medical): No   • Lack of Transportation (Non-Medical): No   Physical Activity: Not on file   Stress: Not on file   Social Connections: Not on file   Intimate Partner Violence: Not on file   Housing Stability: Low Risk    • Unable to Pay for Housing in the Last Year: No   • Number of Places Lived in the Last Year: 1   • Unstable Housing in the Last Year: No        Current Outpatient Medications:   •  Alcohol Swabs 70 % PADS, May substitute brand based on insurance coverage   Check glucose TID , Disp: 100 each, Rfl: 0  •  apixaban (ELIQUIS) 5 mg, Take 1 tablet (5 mg total) by mouth 2 (two) times a day, Disp: 60 tablet, Rfl: 0  •  aspirin (ECOTRIN LOW STRENGTH) 81 mg EC tablet, Take 1 tablet (81 mg total) by mouth daily, Disp: 30 tablet, Rfl: 0  •  Blood Glucose Monitoring Suppl (OneTouch Verio Reflect) w/Device KIT, May substitute brand based on insurance coverage  Check glucose TID , Disp: 1 kit, Rfl: 0  •  budesonide (PULMICORT) 0 5 mg/2 mL nebulizer solution, Take 2 mL (0 5 mg total) by nebulization every 12 (twelve) hours Rinse mouth after use , Disp: , Rfl: 0  •  carvedilol (COREG) 6 25 mg tablet, Take 1 tablet (6 25 mg total) by mouth 2 (two) times a day with meals, Disp: 60 tablet, Rfl: 0  •  ferrous sulfate 325 (65 Fe) mg tablet, Take 1 tablet (325 mg total) by mouth daily with breakfast, Disp: 30 tablet, Rfl: 0  •  fluticasone (FLOVENT HFA) 110 MCG/ACT inhaler, Inhale 1 puff every 12 (twelve) hours Rinse mouth after use , Disp: 12 g, Rfl: 0  •  formoterol (PERFOROMIST) 20 MCG/2ML nebulizer solution, Take 2 mL (20 mcg total) by nebulization every 12 (twelve) hours, Disp: , Rfl: 0  •  gabapentin (NEURONTIN) 100 mg capsule, Take 1 capsule (100 mg total) by mouth 3 (three) times a day, Disp: 90 capsule, Rfl: 0  •  glucose blood (OneTouch Verio) test strip, May substitute brand based on insurance coverage  Check glucose TID , Disp: 100 each, Rfl: 0  •  glucose blood test strip, Use 1 each daily as needed (check sugars) Use as instructed  Uses One Touch Ultra test strip, Disp: 50 strip, Rfl: 2  •  Insulin Glargine Solostar (Lantus SoloStar) 100 UNIT/ML SOPN, Inject 0 36 mL (36 Units total) under the skin daily at bedtime, Disp: 12 mL, Rfl: 0  •  insulin lispro (HumaLOG KwikPen) 100 units/mL injection pen, Inject 5 Units under the skin 3 (three) times a day with meals, Disp: 15 mL, Rfl: 0  •  insulin lispro (HumaLOG) 100 units/mL injection, Inject 1-6 Units under the skin 3 (three) times a day before meals, Disp: , Rfl: 0  •  Insulin Pen Needle (BD Pen Needle Beena 2nd Gen) 32G X 4 MM MISC, For use with insulin pen  Pharmacy may dispense brand covered by insurance , Disp: 100 each, Rfl: 0  •  Insulin Pen Needle (BD Pen Needle Beena 2nd Gen) 32G X 4 MM MISC, For use with insulin pen   Pharmacy may dispense brand covered by insurance , Disp: 100 each, Rfl: 0  • ipratropium (ATROVENT) 0 02 % nebulizer solution, Take 2 5 mL (0 5 mg total) by nebulization every 6 (six) hours, Disp: , Rfl: 0  •  isosorbide mononitrate (IMDUR) 30 mg 24 hr tablet, Take 1 tablet (30 mg total) by mouth daily, Disp: 30 tablet, Rfl: 0  •  levalbuterol (XOPENEX) 1 25 mg/0 5 mL nebulizer solution, Take 0 5 mL (1 25 mg total) by nebulization every 6 (six) hours, Disp: , Rfl: 0  •  OneTouch Delica Lancets 58Z MISC, May substitute brand based on insurance coverage  Check glucose TID , Disp: 100 each, Rfl: 0  •  pantoprazole (PROTONIX) 40 mg tablet, Take 1 tablet (40 mg total) by mouth 2 (two) times a day before meals, Disp: 60 tablet, Rfl: 0  •  QUEtiapine (SEROquel) 25 mg tablet, Take 0 5 tablets (12 5 mg total) by mouth daily at bedtime, Disp: , Rfl: 0  •  senna-docusate sodium (SENOKOT S) 8 6-50 mg per tablet, Take 1 tablet by mouth daily at bedtime, Disp: , Rfl: 0  •  simvastatin (ZOCOR) 40 mg tablet, Take 1 tablet (40 mg total) by mouth daily, Disp: 30 tablet, Rfl: 0  •  tamsulosin (FLOMAX) 0 4 mg, Take 1 capsule (0 4 mg total) by mouth daily with dinner, Disp: 30 capsule, Rfl: 0  •  torsemide 60 MG TABS, Take 60 mg by mouth daily Do not start before December 3, 2022 , Disp: , Rfl: 0  Family History   Problem Relation Age of Onset   • Heart disease Father    • Other Father         Mesothelioma               Coordination of Care: Wound team aware of the treatment plan  Facility nurse will provide wound treatment and monitor the wound for any changes  Patient / Staff education : Patient / Staff was given education on sign of infection and pressure ulcer prevention  Patient/ Staff verbalized understanding     Follow up :  Next week    Voice-recognition software may have been used in the preparation of this document  Occasional wrong word or "sound-alike" substitutions may have occurred due to the inherent limitations of voice recognition software   Interpretation should be guided by context        Jannet Ureña, YOLANDE

## 2022-12-13 NOTE — ASSESSMENT & PLAN NOTE
Lab Results   Component Value Date    HGBA1C 9 7 (H) 10/10/2022   -Wound on the right first and second toe  -Both wounds are covered with 100% eschar, stable, with no obvious sign of infection  -Local wound care with Betadine  -Order open toe shoes  -There is no recent x-ray of the right foot or arterial duplex  Will order if the wound worsened   -Patient to follow-up with podiatry on discharge  -We will continue to follow-up weekly while in the facility

## 2023-01-19 NOTE — CONSULTS
ASSESSMENT/PLAN:    Assessment:   14 y o  female 7 months s/p right clavicle nonunion takedown and ORIF with activity induced/intermittent right upper extremity numbness consistent with possible thoracic outlet syndrome    Plan: Today I had a long discussion with the caregiver regarding the diagnosis and plan moving forward  X-rays reviewed of the clavicle and cervical spine and show no acute osseous abnormalities  Her exam and history provided are consistent with a possible neurogenic thoracic outlet syndrome aggravated by the extreme positions that her neck and shoulder are placed in with wrestling  The numbness only happens following wrestling  And it is intermittent lasting 15 to 20 minutes at a time  She is also ordered for an EMG which I agree with  Given the radicular symptoms I would like to obtain an MRI of the cervical spine to rule out any underlying pathology in her cervical spine such as nerve root compression     Also briefly discussed that some of her irritation over the clavicle could be from the hardware, I would hold off on removing it for at least 1 year postop due to history of nonunion  Beyond that if it is removed she will be at increased risk of refracture if she chooses to continue wrestling  We will hold off and address at a later time after they have had a chance to discuss it  I will see her back after the MRI to discuss results and treatment plan moving forward  Follow up: After MRI    The above diagnosis and plan has been dicussed with the patient and caregiver  They verbalized an understanding and will follow up accordingly  _____________________________________________________  CHIEF COMPLAINT:  Chief Complaint   Patient presents with   • Right Shoulder - Follow-up         SUBJECTIVE:  Aries Dia is a 14 y o  female who presents today with mother who assisted in history, for evaluation of right shoulder pain   1 day ago patient had a flareup of pain in her right Consultation - Lelo Benitez  78 y o  male MRN: [id number]    Unit/Bed#: [facility] 528-01 Encounter: [id number]    Impression:  COPD exacerbation  Diabetes type 2 uncontrolled long-term insulin use  History of CKD hypertension    Assessment: This is a 78y o -year-old male with past medical history was a step 2 on long-term insulin with complications CKD history of hypertension who presents to the hospital for shortness of breath endocrinology consulted for diabetes type 2    Plan:  -currently patient on regular insulin U- 500 45 units BID patient is having episodes of hyperglycemia , steroid induced we recommend to add U 500 regular insulin 45  units before dinner   -recommend to start U500 regular insulin 45 units t i d  Before meals if patient is still having  hyperglycemia tomorrow will consider to add another does of U-500  And make it  q 6 hours   optimal glucose range 140 to 180 continue with Accu-Chcks  Currently patient on prednisone 40 mg    CC: Diabetes Consult    HPI: Lelo Benitez  is a 78y o  year old male with type 2 diabetes on long-term insulin use with complications CKD and neuropathy hypertension who presents to the hospital with chief complaint of shortness of breath patient was diagnosed with COPD exacerbation and was started on steroids patient reports that he is compliant with home insulin and he checks his sugars almost every day patient currently reports neuropathy and denies other symptoms associated with diabetes such as polydipsia polyuria polyphagia    Home regimen U 500 regular insulin 45 units before breakfast 45 units before lunch and 35 units before dinner  A1c 8 8  Review of Systems   Constitutional: Negative for chills and fever  HENT: Negative for ear pain and sore throat  Eyes: Negative for pain and visual disturbance  Respiratory: Positive for shortness of breath  Cardiovascular: Negative for chest pain and palpitations     Gastrointestinal: Negative for abdominal pain and vomiting  Genitourinary: Negative for dysuria and hematuria  Musculoskeletal: Negative for arthralgias and back pain  Skin: Negative for color change and rash  Neurological: Negative for seizures and syncope  All other systems reviewed and are negative  Historical Information   Past Medical History:   Diagnosis Date    Abdominal pain     [facility] ) 2018    Cardiac disease     CHF (congestive heart failure) (HCC)     COPD, severe (HCC)     Coronary artery disease     DDD (degenerative disc disease), lumbar 2020    Diabetes mellitus ([place] [address] )     Dyspnea     GERD (gastroesophageal reflux disease)     Hyperlipidemia     Hypertension     MI (myocardial infarction) ([place] [address] )     with 3 stents    Nodule of apex of right lung     JACQUELINE (obstructive sleep apnea)     Prostate cancer [facility])      Past Surgical History:   Procedure Laterality Date    ABDOMINAL SURGERY      exploratory    ANGIOPLASTY      3 stents    APPENDECTOMY      COLONOSCOPY      CT NEEDLE BIOPSY LUNG  11/3/2020    ESOPHAGOGASTRODUODENOSCOPY N/A 10/2/2017    Procedure: ESOPHAGOGASTRODUODENOSCOPY (EGD); Surgeon: Margie Mathews MD;  Location: [facility];   Service: Gastroenterology    IR THORACENTESIS  11/3/2020    KNEE CARTILAGE SURGERY      OTHER SURGICAL HISTORY      stent placement    PROSTATE SURGERY      SKIN GRAFT      Basal cell CA back     Social History   Social History     Substance and Sexual Activity   Alcohol Use Not Currently     Social History     Substance and Sexual Activity   Drug Use No     Social History     Tobacco Use   Smoking Status Former Smoker    Packs/day: 1 50    Years: 50 00    Pack years: 75 00    Start date: 38    Quit date: 2020    Years since quittin 7   Smokeless Tobacco Never Used     Family History:   Family History   Problem Relation Age of Onset    Heart disease Father     Other Father         Mesothelioma clavicle/shoulder after wrestling  She does have a history of right clavicle fracture nonunion takedown and ORIF on 6/13/2022  Patient states that she had a similar feeling at first to when she broke it so she was evaluated at the ED where x-rays were taken and she was advised to follow-up with orthopedics  She states overall her pain and motion have improved  She does complain of some crunching sensations in the shoulder and numbness in the extremity with overhead reaching  She will also feel numbness when touching her incision  Also with some right-sided neck pain  She has been treating with Dr Hossein Monterroso for cervical radiculopathy, an EMG was ordered of the right upper extremity but has not yet been scheduled  Pain is improved by rest   Pain is aggravated by weight bearing      Radiation of pain Positive  Numbness/tingling Positive the right arm into the long and ring fingers    PAST MEDICAL HISTORY:  Past Medical History:   Diagnosis Date   • Allergic    • Asthma    • Eczema        PAST SURGICAL HISTORY:  Past Surgical History:   Procedure Laterality Date   • DE OPEN TX CLAVICULAR FRACTURE INTERNAL FIXATION Right 6/13/2022    Procedure: OPEN REDUCTION W/ INTERNAL FIXATION (ORIF) CLAVICLE takedown nonunion;  Surgeon: Telma Hui DO;  [facility];  Service: Orthopedics       FAMILY HISTORY:  Family History   Problem Relation Age of Onset   • Allergies Mother    • No Known Problems Father    • Mental illness Neg Hx    • Substance Abuse Neg Hx        SOCIAL HISTORY:  Social History     Tobacco Use   • Smoking status: Never   • Smokeless tobacco: Never   Substance Use Topics   • Drug use: Never       MEDICATIONS:    Current Outpatient Medications:   •  albuterol (ACCUNEB) 1 25 MG/3ML nebulizer solution, Take 1 ampule by nebulization every 6 (six) hours as needed for wheezing, Disp: , Rfl:   •  albuterol (PROVENTIL HFA,VENTOLIN HFA) 90 mcg/act inhaler, Use 1-2 puffs every 4 6 hours for wheezing as Meds/Allergies   Current Facility-Administered Medications   Medication Dose Route Frequency Provider Last Rate Last Admin    albuterol inhalation solution 2 5 mg  2 5 mg Nebulization Q4H PRN Loistine Shearing, DO        cyclobenzaprine (FLEXERIL) tablet 10 mg  10 mg Oral TID Rosalbalesly Shearing, DO   10 mg at 04/22/22 0954    enoxaparin (LOVENOX) subcutaneous injection 40 mg  40 mg Subcutaneous Daily lesly Shearing, DO   40 mg at 04/22/22 9864    ferrous sulfate tablet 325 mg  325 mg Oral Daily With Breakfast Rosalbalesly Shearing, DO   325 mg at 04/22/22 3633    fluticasone-vilanterol (BREO ELLIPTA) 100-25 mcg/inh inhaler 1 puff  1 puff Inhalation Daily Alissa Lioning, DO   1 puff at 04/22/22 1203    furosemide (LASIX) tablet 40 mg  40 mg Oral Daily Rosalbalesly Shearing, DO   40 mg at 04/22/22 0954    insulin lispro (HumaLOG) 100 units/mL subcutaneous injection 1-5 Units  1-5 Units Subcutaneous HS lesly Lioning, DO        insulin lispro (HumaLOG) 100 units/mL subcutaneous injection 1-6 Units  1-6 Units Subcutaneous TID AC lesly Shearing, DO   5 Units at 04/22/22 1203    insulin regular (HumuLIN R U-500) 500 units/mL CONCENTRATED injection 45 Units  45 Units Subcutaneous BID AC lesly Lioning, DO   45 Units at 04/22/22 1206    ipratropium (ATROVENT) 0 02 % inhalation solution 0 5 mg  0 5 mg Nebulization TID lesly Lioning, DO   0 5 mg at 04/22/22 0228    levalbuterol (XOPENEX) inhalation solution 1 25 mg  1 25 mg Nebulization TID lesly Lioning, DO   1 25 mg at 04/22/22 6274    metoprolol tartrate (LOPRESSOR) tablet 25 mg  25 mg Oral BID lesly Shearing, DO   25 mg at 04/22/22 4611    pravastatin (PRAVACHOL) tablet 80 mg  80 mg Oral Daily With [facility] Kiran, DO        predniSONE tablet 40 mg  40 mg Oral Daily lesly Shearing, DO   40 mg at 04/22/22 8978    sodium chloride (PF) 0 9 % injection 3 mL  3 mL Intravenous Q1H PRN Rosalbaistine Shearing, DO         Allergies   Allergen Reactions    Crestor [Rosuvastatin] needed, Disp: 1 Inhaler, Rfl: 5  •  Loratadine 10 MG CAPS, Take by mouth, Disp: , Rfl:   •  Pediatric Multivit-Minerals-C (CHILDRENS MULTIVITAMIN) 60 MG CHEW, Chew, Disp: , Rfl:   No current facility-administered medications for this visit  ALLERGIES:  Allergies   Allergen Reactions   • Other Allergic Rhinitis and Wheezing     Seasonal        REVIEW OF SYSTEMS:  ROS is negative other than that noted in the HPI  Constitutional: Negative for fatigue and fever  HENT: Negative for sore throat  Respiratory: Negative for shortness of breath  Cardiovascular: Negative for chest pain  Gastrointestinal: Negative for abdominal pain  Endocrine: Negative for cold intolerance and heat intolerance  Genitourinary: Negative for flank pain  Musculoskeletal: Negative for back pain  Skin: Negative for rash  Allergic/Immunologic: Negative for immunocompromised state  Neurological: Negative for dizziness  Psychiatric/Behavioral: Negative for agitation  _____________________________________________________  PHYSICAL EXAMINATION:  There were no vitals filed for this visit  General/Constitutional: NAD, well developed, well nourished  HENT: Normocephalic, atraumatic  CV: Intact distal pulses, regular rate  Resp: No respiratory distress or labored breathing  Abd: Soft and NT  Lymphatic: No lymphadenopathy palpated  Neuro: Alert,no focal deficits  Psych: Normal mood  Skin: Warm, dry, no rashes, no erythema      MUSCULOSKELETAL EXAMINATION:  Right Shoulder:   Skin intact, incision well-healed, no significant tenderness over the clavicle plate      Rotator cuff SS 5/5    IS 5/5    SubS 5/5   ROM FF  Abduction full  full    ER0 60    IRb T6   TTP: AC Negative    Clavicle  Negative    Proximal Humerus Negative   Special Tests: O'Briens Negative    Cross body Adduction Negative    Speeds  Negative    Alex's Negative    Neer Negative    Worley Negative   Instability: Apprehension & relocation negative     Well Other (See Comments)     Unknown      Metformin GI Intolerance       Objective   Vitals: Blood pressure 115/52, pulse 82, temperature 98 4 °F (36 9 °C), temperature source Oral, resp  rate 18, height 6' (1 829 m), weight 100 kg (220 lb 14 4 oz), SpO2 99 %  Intake/Output Summary (Last 24 hours) at 4/22/2022 1305  Last data filed at 4/22/2022 1130  Gross per 24 hour   Intake 1780 ml   Output 1950 ml   Net -170 ml     Invasive Devices  Report    Peripheral Intravenous Line            Peripheral IV 04/21/22 Right Forearm <1 day                Physical Exam  Constitutional:       General: He is not in acute distress  Appearance: He is not ill-appearing  HENT:      Head: Normocephalic and atraumatic  Nose: No congestion or rhinorrhea  Eyes:      Conjunctiva/sclera: Conjunctivae normal       Pupils: Pupils are equal, round, and reactive to light  Cardiovascular:      Rate and Rhythm: Normal rate  Pulses: Normal pulses  Heart sounds: No murmur heard  Pulmonary:      Effort: No respiratory distress  Breath sounds: Normal breath sounds  No wheezing  Abdominal:      General: There is no distension  Tenderness: There is no abdominal tenderness  Musculoskeletal:         General: No swelling or tenderness  Cervical back: No rigidity or tenderness  Skin:     Coloration: Skin is not jaundiced or pale  Neurological:      Mental Status: He is alert and oriented to person, place, and time  Motor: No weakness  Psychiatric:         Mood and Affect: Mood normal          Thought Content:  Thought content normal          Lab Results:       Lab Results   Component Value Date    WBC 7 78 04/22/2022    HGB 12 7 04/22/2022    HCT 40 8 04/22/2022    MCV 82 04/22/2022     04/22/2022     Lab Results   Component Value Date/Time    BUN 22 04/22/2022 03:57 AM    BUN 17 09/09/2015 05:30 AM     09/09/2015 05:30 AM    K 4 8 04/22/2022 03:57 AM    K 4 2 09/09/2015 05:30 AM    CL healed incision over clavicle  No erythema, warmth or fluctuance      UE NV Exam: +2 Radial pulses bilaterally  Sensation intact to light touch C5-T1 bilaterally, Radial/median/ulnar nerve motor intact    Elbow flexion (C6) 5/5  Wrist extension (C7) 5/5  Wrist flexion (C8) 5/5  Finger abduction (T1) 5/5      Bilateral elbow, wrist, and and forearm ROM full, painless with passive ROM, no ttp or crepitance throughout extremities below shoulder joint    Cervical ROM is full without pain, numbness or tingling    _____________________________________________________  STUDIES REVIEWED:  Imaging studies reviewed by Dr Jeremias Sosa and demonstrate healed midshaft right clavicle fracture  No acute osseous abnormalities  Well-positioned plate and screws with no signs of migration or failure  Cervical spine x-rays taken today demonstrate no vertebral abnormalities        PROCEDURES PERFORMED:  No Procedures performed today     Scribe Attestation    I,:  Mic Dupree am acting as a scribe while in the presence of the attending physician :       I,:  Tita Meredith DO personally performed the services described in this documentation    as scribed in my presence : 94 (L) 04/22/2022 03:57 AM     09/09/2015 05:30 AM    CO2 26 04/22/2022 03:57 AM    CO2 27 06/14/2019 08:47 PM    CREATININE 1 78 (H) 04/22/2022 03:57 AM    CREATININE 1 03 09/09/2015 05:30 AM    AST 13 03/29/2022 03:47 PM    AST 19 09/08/2015 02:00 PM    ALT 17 03/29/2022 03:47 PM    ALT 27 09/08/2015 02:00 PM    ALB 3 3 (L) 03/29/2022 03:47 PM    ALB 3 3 (L) 09/08/2015 02:00 PM     No results for input(s): CHOL, HDL, LDL, TRIG, VLDL in the last 72 hours  No results found for: Blanca Paulino  POC Glucose (mg/dl)   Date Value   04/22/2022 345 (H)   04/22/2022 495 (H)   04/22/2022 >500 (HH)   02/23/2022 306 (H)   02/22/2022 207 (H)   02/22/2022 400 (H)   02/22/2022 357 (H)   02/22/2022 223 (H)   02/21/2022 354 (H)   02/21/2022 397 (H)           Portions of the record may have been created with voice recognition software

## 2023-04-17 NOTE — PLAN OF CARE
DISCHARGE PLANNING     Discharge to home or other facility with appropriate resources Progressing        INFECTION - ADULT     Absence or prevention of progression during hospitalization Progressing     Absence of fever/infection during neutropenic period Progressing        Knowledge Deficit     Patient/family/caregiver demonstrates understanding of disease process, treatment plan, medications, and discharge instructions Progressing        PAIN - ADULT     Verbalizes/displays adequate comfort level or baseline comfort level Progressing        Potential for Falls     Patient will remain free of falls Progressing        SAFETY ADULT     Maintain or return to baseline ADL function Progressing     Maintain or return mobility status to optimal level Progressing
Problem: DISCHARGE PLANNING - CARE MANAGEMENT  Goal: Discharge to post-acute care or home with appropriate resources  INTERVENTIONS:  - Conduct assessment to determine patient/family and health care team treatment goals, and need for post-acute services based on payer coverage, community resources, and patient preferences, and barriers to discharge  - Address psychosocial, clinical, and financial barriers to discharge as identified in assessment in conjunction with the patient/family and health care team  - Arrange appropriate level of post-acute services according to patient's   needs and preference and payer coverage in collaboration with the physician and health care team  - Communicate with and update the patient/family, physician, and health care team regarding progress on the discharge plan  - Arrange appropriate transportation to post-acute venues  Outcome: Progressing
Problem: PHYSICAL THERAPY ADULT  Goal: Performs mobility at highest level of function for planned discharge setting  See evaluation for individualized goals  Treatment/Interventions: Functional transfer training, Elevations, Therapeutic exercise, Endurance training, Patient/family training, Gait training  Equipment Recommended:  (Pt has cane and scooter at home )       See flowsheet documentation for full assessment, interventions and recommendations  Outcome: Progressing  Prognosis: Good  Problem List: Decreased endurance, Decreased mobility, Pain, Decreased safety awareness  Assessment: Pt supine in bed prior to session; able to supine > sit EOB with S and roll L/R with S; STS/SPT with S and RW: amb 150' (x2) with RW and S; after seated rest, instructed pt in seated TE (as above); ambulated 150' S with RW back to room; finished session with xfer stand > sit EOB > supine with S and use of  bed rails; recommend cont PT POC; Barriers to Discharge: Other (Comment) (Full flight of stairs to bathroom, Decrease SP02 w/activity)     Recommendation: Home PT, Home with family support     PT - OK to Discharge: No    See flowsheet documentation for full assessment 
Problem: PHYSICAL THERAPY ADULT  Goal: Performs mobility at highest level of function for planned discharge setting  See evaluation for individualized goals  Treatment/Interventions: Functional transfer training, Elevations, Therapeutic exercise, Endurance training, Patient/family training, Gait training  Equipment Recommended:  (Pt has cane and scooter at home )       See flowsheet documentation for full assessment, interventions and recommendations  Prognosis: Good  Problem List: Decreased endurance, Decreased mobility, Pain, Decreased safety awareness  Assessment: Pt is a 81year old male with diagnosis of acute hypoxemic respiratory failure with R pleural effusion who presented to ED with  SOB and chest pain  Pt with PMH of CHF, COPD, 02 dependence, prostate CA, CAD, GERD, HLD, DM II, opiod dependence, CKD and HTN  Pt presents with deficits in activity tolerance, balance, endurance and overall functional mobility  He has decreased safety awareness and insight into current functional deficits  Pt required min A for sit-stand transfers, as well as min A to ambulate with RW ih seated rest break due to SOB with correlating drop in SP02 level to 76% with 1L02 via NC  Pt required 5 mins of seated rest and cues for pursed lip breathing for SP02 to return to 95%  Pt will benefit from continued skilled PT services in order to address his limitations and to ensure safe d/c home when medically cleared  Barriers to Discharge: Other (Comment) (Full flight of stairs to bathroom, Decrease SP02 w/activity)     Recommendation: Home PT, Home with family support     PT - OK to Discharge: No    See flowsheet documentation for full assessment 
Detail Level: Detailed

## 2023-05-16 ENCOUNTER — TELEPHONE (OUTPATIENT)
Dept: FAMILY MEDICINE CLINIC | Facility: CLINIC | Age: 78
End: 2023-05-16

## 2023-05-16 NOTE — TELEPHONE ENCOUNTER
Tried to call patient to schedule a medical annual wellness visit  Telephone rang and then hung up, could not leave a message

## 2023-07-15 NOTE — ASSESSMENT & PLAN NOTE
· H/o adenocarcinoma of the prostate   · S/p prostate seed brachytherapy followed by pelvic radiation in 2007  · Outpatient follow-up No

## 2023-07-18 NOTE — DISCHARGE SUMMARY
[facility] [facility]  Discharge- Tan Gilbert Sr  1945, 78 y o  male MRN: [id number]  Unit/Bed#: [facility] 827-01 Encounter: [id number]  Primary Care Provider: Chai Shoemaker MD   Date and time admitted to hospital: 6/15/2022  5:05 AM    * Acute cystitis without hematuria  Assessment & Plan  · U Cx grew Psuedomonas  · Rocephin -> Cipro  · Plan to complete 10 days Cipro    Epididymitis, bilateral  Assessment & Plan  Patient presented with progressive scrotal pain for the past 2 days  Ultrasound with  Bilateral epididymitis and left-sided orchitis  Complex left hydrocele possibly reactive related to adjacent inflammation/infection  CT scan with cellulitis in the area of the mons pubis  Abnormal urinalysis  Patient afebrile with leukocytosis  IV ceftriaxone -> Cipro as U Cx grew [address] urology for further management - appreciated  Pain control and supportive care - c/w oxycodone 7 5 mg prn  CKD (chronic kidney disease) stage 3, GFR 30-59 ml/min [facility])  Assessment & Plan  Lab Results   Component Value Date    EGFR 54 06/18/2022    EGFR 48 06/17/2022    EGFR 54 06/16/2022    CREATININE 1 29 06/18/2022    CREATININE 1 41 (H) 06/17/2022    CREATININE 1 29 06/16/2022   Estimated Creatinine Clearance: 61 5 mL/min (by C-G formula based on SCr of 1 29 mg/dL)    Baseline appears around 1 2-1 4  Monitor    Adenocarcinoma of lung [facility])  Assessment & Plan  Follows with radiation oncology as outpatient, most recent office note reviewed from 9/2021  · Recent CTA chest 3/29/22 without acute findings in chest  · Continue outpatient f/u      Chronic respiratory failure with hypoxia (HCC)  Assessment & Plan  · On baseline 2-3L NC       DDD (degenerative disc disease), lumbar  Assessment & Plan  · C/w Flexeril bid  · Added Neurontin  · Can use oxycodone for this as well    CAD (coronary artery disease)  Assessment & Plan  history of CAD with prior stenting (D2 and RCA x2 prior to Addended by: MELANIE OCONNELL on: 7/18/2023 03:14 PM     Modules accepted: Level of Service     2015)   · Continue aspirin carvedilol statin      Essential hypertension  Assessment & Plan  Acceptable BP  Continue home meds    Chronic diastolic heart failure (HCC)  Assessment & Plan  Wt Readings from Last 3 Encounters:   06/15/22 99 8 kg (220 lb)   06/09/22 99 8 kg (220 lb)   05/17/22 98 9 kg (218 lb)     Continue home dose Lasix        COPD, severe (HCC)  Assessment & Plan  · Stable  · Continue nebs, inhalers      Venous stasis dermatitis of both lower extremities  Assessment & Plan  Continue with local care    Type 2 diabetes mellitus with hyperglycemia, with long-term current use of insulin [facility])  Assessment & Plan  Lab Results   Component Value Date    HGBA1C 9 2 (H) 04/22/2022       Recent Labs     06/18/22  1755 06/18/22  2119 06/19/22  0004 06/19/22  0537   POCGLU 86 159* 156* 113       Blood Sugar Average: Last 72 hrs:  (P) 423 [id number]   Continue U500  Monitor blood sugar  BSs high, endo appreciated  U500 switched back to tid  Per endo, d/c on home regimen        Medical Problems             Resolved Problems  Date Reviewed: 6/19/2022   None               Discharging Physician / Practitioner: Jyotsna Dean DO  PCP: Raya Clark MD  Admission Date:   Admission Orders (From admission, onward)     Ordered        06/15/22 1202  Inpatient Admission  Once                      Discharge Date: 06/19/22    Consultations During Hospital Stay:  · Urology  · Endo    Procedures Performed:   · none    Significant Findings / Test Results:   · See above    Incidental Findings:   · none     Test Results Pending at Discharge (will require follow up):   · none     Outpatient Tests Requested:  · none    Complications:  none    Reason for Admission: [facility] Course:   Gabo Cole  is a 78 y o  male patient who originally presented to the hospital on 6/15/2022 due to epidydimitis  Tx w/ abx  Was on Rocephin but U Cx grew Pseudomonas  Rocephin -> Cipro  Improved w/ Cipro    Will complete 10 day course of Cipro  Please see above list of diagnoses and related plan for additional information  Condition at Discharge: stable    Discharge Day Visit / Exam:   Subjective:  Feeling better  Vitals: Blood Pressure: 120/54 (06/19/22 0659)  Pulse: 76 (06/18/22 2223)  Temperature: (!) 97 3 °F (36 3 °C) (06/19/22 0659)  Temp Source: Oral (06/17/22 1436)  Respirations: 18 (06/19/22 0659)  Height: 6' (182 9 cm) (06/15/22 1648)  Weight - Scale: 99 8 kg (220 lb) (06/15/22 1648)  SpO2: 100 % (06/19/22 0718)  Exam:   Physical Exam  HENT:      Head: Normocephalic and atraumatic  Nose: Nose normal       Mouth/Throat:      Mouth: Mucous membranes are moist    Eyes:      Extraocular Movements: Extraocular movements intact  Conjunctiva/sclera: Conjunctivae normal    Cardiovascular:      Rate and Rhythm: Normal rate and regular rhythm  Pulmonary:      Effort: Pulmonary effort is normal       Breath sounds: Normal breath sounds  No wheezing or rales  Abdominal:      General: Bowel sounds are normal  There is no distension  Palpations: Abdomen is soft  Tenderness: There is no abdominal tenderness  Musculoskeletal:         General: Normal range of motion  Cervical back: Normal range of motion and neck supple  Right lower leg: No edema  Left lower leg: No edema  Skin:     General: Skin is warm and dry  Neurological:      Mental Status: He is alert and oriented to person, place, and time  Discussion with Family: Patient declined call to   Discharge instructions/Information to patient and family:   See after visit summary for information provided to patient and family  Provisions for Follow-Up Care:  See after visit summary for information related to follow-up care and any pertinent home health orders         Disposition:   Home with VNA Services (Reminder: Complete face to face encounter)    Planned Readmission: no     Discharge Statement:  I spent 33 minutes discharging the patient  This time was spent on the day of discharge  I had direct contact with the patient on the day of discharge  Greater than 50% of the total time was spent examining patient, answering all patient questions, arranging and discussing plan of care with patient as well as directly providing post-discharge instructions  Additional time then spent on discharge activities  Discharge Medications:  See after visit summary for reconciled discharge medications provided to patient and/or family        **Please Note: This note may have been constructed using a voice recognition system**

## 2023-11-12 NOTE — ASSESSMENT & PLAN NOTE
· On 3L NC continuously and at baseline oxygen requirement  · Continue supplemental oxygen as needed to maintain SaO2 greater than 88%  · Incentive spirometry, pulmonary toileting 0

## 2024-03-30 NOTE — ED NOTES
Dr Vera Dill at bedside for patient evaluation      Lakisha Vaughn, BEATRICE  05/23/18 1701
Patient transported to [facility] Mishel Moore RN  05/23/18 9552
Respiratory at bedside     Ken Addison RN  05/23/18 0091
Respiratory paged and made aware of heart neb        Keith Sykes RN  05/23/18 0005
IV and/or equipment tethered to patient/Orthostatic hypotension/Other medical problems/Post procedure/Weakness

## 2025-04-23 NOTE — CASE MANAGEMENT
Case Management Discharge Planning Note    Patient name Maria Antonia Vazquez  Location [facility] 603/[facility] 075-63 MRN [id number]  : 1945 Date 2022       Current Admission Date: 2022  Current Admission Diagnosis:Sepsis [facility])   Patient Active Problem List    Diagnosis Date Noted    CKD (chronic kidney disease) stage 3, GFR 30-59 ml/min [facility] ) 2022    Hypercalcemia 2022    History of prostate cancer 2022    UTI (urinary tract infection) 2022    Bacteremia 2022    Adenocarcinoma of lung ([facility] ) 2022    Fracture of navicular bone of left foot 2021    Chronic respiratory failure with hypoxia ([facility] ) 04/15/2021    Obesity, morbid ([facility] ) 2021    PAD (peripheral artery disease) ([facility] ) 2021    History of lung cancer 2020    DDD (degenerative disc disease), lumbar 2020    Anemia, iron deficiency 2020    Lung nodule 2020    Pulmonary hypertension ([facility] ) 2019    JACQUELINE (obstructive sleep apnea) 2019    COPD with acute exacerbation ([facility] ) 2019    Oxygen dependent 2018    RBBB 2018    CAD (coronary artery disease) 2018    Chronic diastolic heart failure ([facility] ) 2018    Pleural effusion on right 10/31/2017    Sepsis ([facility] ) 2017    COPD, severe ([facility] ) 2017    Diabetic neuropathy ([facility] ) 2017    Essential hypertension 2016    Venous stasis dermatitis of both lower extremities 11/15/2016    Type 2 diabetes mellitus with hyperglycemia, with long-term current use of insulin ([facility] ) 2016    Obesity (BMI 30-39 9) 2016    HLD (hyperlipidemia) 2016      LOS (days): 9  Geometric Mean LOS (GMLOS) (days): 4 80  Days to GMLOS:-4 4     OBJECTIVE:  Risk of Unplanned Readmission Score: 29         Current admission status: Inpatient   Preferred Pharmacy:   [address] [facility], [address] [place] [id number]  Phone: Occupational Therapy Visit       Visit Type: Daily Treatment Note  Visit: 15  Referring Provider: Reed Woodruff MD  Medical Diagnosis (from order): M25.511 - Right shoulder pain, unspecified chronicity     SUBJECTIVE                                                                                                               Pt reports pain is 0/10. Pt reports increase in ease reaching into cupboards.       OBJECTIVE                                                                                                                     Range of Motion (ROM)   (degrees unless noted; active unless noted; norms in ( ); negative=lacking to 0, positive=beyond 0)  Shoulder:    - Flexion (180):        • Right: 135    - Abduction (180):        • Right: 141    - Internal Rotation:        - Behind Back:           • Right: hip    - External Rotation:       - at 0°:            • Right: 64    Strength  (out of 5 unless noted, standard test position unless noted)   Shoulder:     - Internal Rotation:          • Right (at 0°): 3+    - External Rotation:         • Right (at 0°): 3                             Treatment     Therapeutic Exercise  Upper body bike reverse level one 4 misn  Pulleys into shoulder flexion x 5 mins  Supine active shoulder flexion x 10 reps  Supine chest press 5# x 10 reps  Supine 0-90 2# x 10 reps  Standing towel internal rotation stretch 30 sec x 3  Internal rotation behind back with weight ball x 20 reps  Wall slides into shoulder flexion with eccentric lowering x 10 reps        Manual Therapy   Supine pec stretch  Extensive Inferior glide of humerus with passive shoulder flexion grade III  Extensive posterior glide of humerus with passive shoulder flexion grade III  Extensive passive shoulder external rotation supine  passive shoulder flexion supine and scaption  Passive internal rotation extensive at variety of angles of scaption        ASSESSMENT                                                                                                             Pt continues to progress range of motion and function.  Continue as tolerated to progress end range.    Education:   - Results of above outlined education: Verbalizes understanding    Goals  Long Term Goals: to be met by end of plan of care   1. Goals  Long Term Goals: to be met by end of plan of care   1. Patient will demonstrate or report donning/doffing full fasten shirt or coat, one arm at a time, including proper alignment and completion of fasteners, without compensation/adaptive technique or extra time with patient reported manageable/tolerable difficulty.-ongoing  2. Patient will demonstrate or report fastening/unfastening seat belt without compensation/adaptive technique or extra time with patient reported manageable pain/symptoms of 2/10. -ongoing  3. Patient will reach overhead and without compensatory techniques, with patient reported manageable/tolerable difficulty for moving dishes/objects in and out of cabinets. Improve impaired ROM to: shoulder flexion to be greater than or equal to 150.  -not met  4. Patient will reach out to side with proper scapulohumeral rhythm, without pain/symptoms for grocery shopping/lifestyle errands. Improve impaired ROM to: shoulder abduction to 150 or more. -not met  5.  Patient will demonstrate ability to reach behind back and fasten/unfasten bra in order to perform upper body dressing with patient reported manageable/tolerable difficulty. Improve impaired ROM to: internal rotation to be greater than or equal to 60.    6.  Quick DASH: Patient will score 69 or lower on The Quick DASH to indicate a decreased level of impairment with lifting, carrying, household and self-care activity. (minimal clinically important difference: 14 of calculated score) -not met        Therapy procedure time and total treatment time can be found documented on the Time Entry flowsheet   [phone] Fax: [phone]    Primary Care Provider: Keren Van MD    Primary Insurance: [facility]  Secondary Insurance:     DISCHARGE DETAILS:    Discharge planning discussed with[de-identified] Patient  Freedom of Choice: Yes  Comments - Freedom of Choice: Discussed FOC  CM contacted family/caregiver?: Yes  Were Treatment Team discharge recommendations reviewed with patient/caregiver?: Yes  Did patient/caregiver verbalize understanding of patient care needs?: Yes  Were patient/caregiver advised of the risks associated with not following Treatment Team discharge recommendations?: Yes    Contacts  Patient Contacts: Latoya Marie  Relationship to Patient[de-identified] Family  Contact Method: Phone  Phone Number: [phone]  Reason/Outcome: Continuity of [address]       Other Referral/Resources/Interventions Provided:  Interventions: [facility]  Referral Comments: This CM spoke to patient regarding SNF and need for him to participate in therapy sessions, patient refusing to participate, stating he doesn't need therapy  Patient aware of risks of not following treatment team recommendations (which is STR)  [facility], requesting [facility] first, and if [facility] unable to accept, agreeable to blanket referrral to local agencies that service his zip code    TC placed to son, Lucia Thakur, regarding same, requesting TC back to this CM    IMM Given (Date):: 01/30/22  IMM Given to[de-identified] Patient
